# Patient Record
Sex: FEMALE | Race: WHITE | NOT HISPANIC OR LATINO | URBAN - METROPOLITAN AREA
[De-identification: names, ages, dates, MRNs, and addresses within clinical notes are randomized per-mention and may not be internally consistent; named-entity substitution may affect disease eponyms.]

---

## 2018-07-20 PROBLEM — Z00.00 ENCOUNTER FOR PREVENTIVE HEALTH EXAMINATION: Status: ACTIVE | Noted: 2018-07-20

## 2020-10-07 ENCOUNTER — INPATIENT (INPATIENT)
Facility: HOSPITAL | Age: 70
LOS: 15 days | Discharge: NURSING, REHAB OR LONG-TERM CARE | DRG: 871 | End: 2020-10-23
Attending: INTERNAL MEDICINE | Admitting: INTERNAL MEDICINE
Payer: MEDICARE

## 2020-10-07 VITALS — OXYGEN SATURATION: 81 % | HEIGHT: 65 IN | WEIGHT: 264.55 LBS

## 2020-10-07 DIAGNOSIS — J18.9 PNEUMONIA, UNSPECIFIED ORGANISM: ICD-10-CM

## 2020-10-07 DIAGNOSIS — A41.9 SEPSIS, UNSPECIFIED ORGANISM: ICD-10-CM

## 2020-10-07 DIAGNOSIS — E11.9 TYPE 2 DIABETES MELLITUS WITHOUT COMPLICATIONS: ICD-10-CM

## 2020-10-07 DIAGNOSIS — A40.3 SEPSIS DUE TO STREPTOCOCCUS PNEUMONIAE: ICD-10-CM

## 2020-10-07 DIAGNOSIS — J96.01 ACUTE RESPIRATORY FAILURE WITH HYPOXIA: ICD-10-CM

## 2020-10-07 DIAGNOSIS — M06.9 RHEUMATOID ARTHRITIS, UNSPECIFIED: ICD-10-CM

## 2020-10-07 DIAGNOSIS — E03.9 HYPOTHYROIDISM, UNSPECIFIED: ICD-10-CM

## 2020-10-07 LAB
ALBUMIN SERPL ELPH-MCNC: 3.3 G/DL — SIGNIFICANT CHANGE UP (ref 3.3–5)
ALP SERPL-CCNC: 44 U/L — SIGNIFICANT CHANGE UP (ref 40–120)
ALT FLD-CCNC: 66 U/L — HIGH (ref 10–45)
ANION GAP SERPL CALC-SCNC: 13 MMOL/L — SIGNIFICANT CHANGE UP (ref 5–17)
ANION GAP SERPL CALC-SCNC: 14 MMOL/L — SIGNIFICANT CHANGE UP (ref 5–17)
APPEARANCE UR: ABNORMAL
APTT BLD: 32.6 SEC — SIGNIFICANT CHANGE UP (ref 27.5–35.5)
AST SERPL-CCNC: 72 U/L — HIGH (ref 10–40)
BASE EXCESS BLDV CALC-SCNC: -5.6 MMOL/L — LOW (ref -2–2)
BASOPHILS # BLD AUTO: 0 K/UL — SIGNIFICANT CHANGE UP (ref 0–0.2)
BASOPHILS NFR BLD AUTO: 0 % — SIGNIFICANT CHANGE UP (ref 0–2)
BILIRUB SERPL-MCNC: 1.2 MG/DL — SIGNIFICANT CHANGE UP (ref 0.2–1.2)
BILIRUB UR-MCNC: NEGATIVE — SIGNIFICANT CHANGE UP
BUN SERPL-MCNC: 7 MG/DL — SIGNIFICANT CHANGE UP (ref 7–23)
BUN SERPL-MCNC: 9 MG/DL — SIGNIFICANT CHANGE UP (ref 7–23)
CA-I SERPL-SCNC: 1.11 MMOL/L — LOW (ref 1.12–1.3)
CALCIUM SERPL-MCNC: 7.9 MG/DL — LOW (ref 8.4–10.5)
CALCIUM SERPL-MCNC: 8.9 MG/DL — SIGNIFICANT CHANGE UP (ref 8.4–10.5)
CHLORIDE BLDV-SCNC: 94 MMOL/L — LOW (ref 96–108)
CHLORIDE SERPL-SCNC: 88 MMOL/L — LOW (ref 96–108)
CHLORIDE SERPL-SCNC: 90 MMOL/L — LOW (ref 96–108)
CHLORIDE UR-SCNC: 69 MMOL/L — SIGNIFICANT CHANGE UP
CO2 BLDV-SCNC: 23 MMOL/L — SIGNIFICANT CHANGE UP (ref 22–30)
CO2 SERPL-SCNC: 18 MMOL/L — LOW (ref 22–31)
CO2 SERPL-SCNC: 20 MMOL/L — LOW (ref 22–31)
COLOR SPEC: YELLOW — SIGNIFICANT CHANGE UP
CREAT ?TM UR-MCNC: 11 MG/DL — SIGNIFICANT CHANGE UP
CREAT SERPL-MCNC: 0.37 MG/DL — LOW (ref 0.5–1.3)
CREAT SERPL-MCNC: <0.3 MG/DL — LOW (ref 0.5–1.3)
DIFF PNL FLD: ABNORMAL
EOSINOPHIL # BLD AUTO: 0 K/UL — SIGNIFICANT CHANGE UP (ref 0–0.5)
EOSINOPHIL NFR BLD AUTO: 0 % — SIGNIFICANT CHANGE UP (ref 0–6)
GAS PNL BLDA: SIGNIFICANT CHANGE UP
GAS PNL BLDV: 123 MMOL/L — LOW (ref 135–145)
GAS PNL BLDV: SIGNIFICANT CHANGE UP
GAS PNL BLDV: SIGNIFICANT CHANGE UP
GLUCOSE BLDC GLUCOMTR-MCNC: 261 MG/DL — HIGH (ref 70–99)
GLUCOSE BLDV-MCNC: 183 MG/DL — HIGH (ref 70–99)
GLUCOSE SERPL-MCNC: 162 MG/DL — HIGH (ref 70–99)
GLUCOSE SERPL-MCNC: 282 MG/DL — HIGH (ref 70–99)
GLUCOSE UR QL: NEGATIVE — SIGNIFICANT CHANGE UP
HCO3 BLDV-SCNC: 21 MMOL/L — SIGNIFICANT CHANGE UP (ref 21–29)
HCT VFR BLD CALC: 30.6 % — LOW (ref 34.5–45)
HCT VFR BLDA CALC: 25 % — LOW (ref 39–50)
HGB BLD CALC-MCNC: 8 G/DL — LOW (ref 11.5–15.5)
HGB BLD-MCNC: 9.4 G/DL — LOW (ref 11.5–15.5)
INR BLD: 1.5 RATIO — HIGH (ref 0.88–1.16)
KETONES UR-MCNC: SIGNIFICANT CHANGE UP
LACTATE BLDV-MCNC: 2.8 MMOL/L — HIGH (ref 0.7–2)
LEUKOCYTE ESTERASE UR-ACNC: ABNORMAL
LYMPHOCYTES # BLD AUTO: 1.27 K/UL — SIGNIFICANT CHANGE UP (ref 1–3.3)
LYMPHOCYTES # BLD AUTO: 10.5 % — LOW (ref 13–44)
MAGNESIUM SERPL-MCNC: 1.5 MG/DL — LOW (ref 1.6–2.6)
MCHC RBC-ENTMCNC: 24.9 PG — LOW (ref 27–34)
MCHC RBC-ENTMCNC: 30.7 GM/DL — LOW (ref 32–36)
MCV RBC AUTO: 81.2 FL — SIGNIFICANT CHANGE UP (ref 80–100)
MONOCYTES # BLD AUTO: 0.31 K/UL — SIGNIFICANT CHANGE UP (ref 0–0.9)
MONOCYTES NFR BLD AUTO: 2.6 % — SIGNIFICANT CHANGE UP (ref 2–14)
NEUTROPHILS # BLD AUTO: 10.29 K/UL — HIGH (ref 1.8–7.4)
NEUTROPHILS NFR BLD AUTO: 65.8 % — SIGNIFICANT CHANGE UP (ref 43–77)
NITRITE UR-MCNC: NEGATIVE — SIGNIFICANT CHANGE UP
OSMOLALITY SERPL: 255 MOSMOL/KG — LOW (ref 280–301)
OSMOLALITY UR: 361 MOS/KG — SIGNIFICANT CHANGE UP (ref 300–900)
PCO2 BLDV: 54 MMHG — HIGH (ref 35–50)
PH BLDV: 7.22 — LOW (ref 7.35–7.45)
PH UR: 6 — SIGNIFICANT CHANGE UP (ref 5–8)
PHOSPHATE SERPL-MCNC: 2.9 MG/DL — SIGNIFICANT CHANGE UP (ref 2.5–4.5)
PLATELET # BLD AUTO: 132 K/UL — LOW (ref 150–400)
PO2 BLDV: 32 MMHG — SIGNIFICANT CHANGE UP (ref 25–45)
POTASSIUM BLDV-SCNC: 2.7 MMOL/L — CRITICAL LOW (ref 3.5–5.3)
POTASSIUM SERPL-MCNC: 3 MMOL/L — LOW (ref 3.5–5.3)
POTASSIUM SERPL-MCNC: 3.1 MMOL/L — LOW (ref 3.5–5.3)
POTASSIUM SERPL-SCNC: 3 MMOL/L — LOW (ref 3.5–5.3)
POTASSIUM SERPL-SCNC: 3.1 MMOL/L — LOW (ref 3.5–5.3)
PROT SERPL-MCNC: 6.7 G/DL — SIGNIFICANT CHANGE UP (ref 6–8.3)
PROT UR-MCNC: 100 — SIGNIFICANT CHANGE UP
PROTHROM AB SERPL-ACNC: 17.6 SEC — HIGH (ref 10.6–13.6)
RBC # BLD: 3.77 M/UL — LOW (ref 3.8–5.2)
RBC # FLD: 15.7 % — HIGH (ref 10.3–14.5)
SAO2 % BLDV: 48 % — LOW (ref 67–88)
SARS-COV-2 RNA SPEC QL NAA+PROBE: SIGNIFICANT CHANGE UP
SARS-COV-2 RNA SPEC QL NAA+PROBE: SIGNIFICANT CHANGE UP
SODIUM SERPL-SCNC: 121 MMOL/L — LOW (ref 135–145)
SODIUM SERPL-SCNC: 122 MMOL/L — LOW (ref 135–145)
SODIUM UR-SCNC: 62 MMOL/L — SIGNIFICANT CHANGE UP
SP GR SPEC: 1.02 — SIGNIFICANT CHANGE UP (ref 1.01–1.02)
UROBILINOGEN FLD QL: NEGATIVE — SIGNIFICANT CHANGE UP
WBC # BLD: 12.09 K/UL — HIGH (ref 3.8–10.5)
WBC # FLD AUTO: 12.09 K/UL — HIGH (ref 3.8–10.5)

## 2020-10-07 PROCEDURE — 99291 CRITICAL CARE FIRST HOUR: CPT | Mod: CS,25

## 2020-10-07 PROCEDURE — 71250 CT THORAX DX C-: CPT | Mod: 26

## 2020-10-07 PROCEDURE — 93010 ELECTROCARDIOGRAM REPORT: CPT | Mod: 59

## 2020-10-07 PROCEDURE — 71045 X-RAY EXAM CHEST 1 VIEW: CPT | Mod: 26,77

## 2020-10-07 PROCEDURE — 99291 CRITICAL CARE FIRST HOUR: CPT

## 2020-10-07 PROCEDURE — 31500 INSERT EMERGENCY AIRWAY: CPT

## 2020-10-07 PROCEDURE — 71045 X-RAY EXAM CHEST 1 VIEW: CPT | Mod: 26

## 2020-10-07 RX ORDER — SODIUM CHLORIDE 9 MG/ML
1500 INJECTION INTRAMUSCULAR; INTRAVENOUS; SUBCUTANEOUS ONCE
Refills: 0 | Status: COMPLETED | OUTPATIENT
Start: 2020-10-07 | End: 2020-10-07

## 2020-10-07 RX ORDER — GLUCAGON INJECTION, SOLUTION 0.5 MG/.1ML
1 INJECTION, SOLUTION SUBCUTANEOUS ONCE
Refills: 0 | Status: DISCONTINUED | OUTPATIENT
Start: 2020-10-07 | End: 2020-10-08

## 2020-10-07 RX ORDER — VANCOMYCIN HCL 1 G
1000 VIAL (EA) INTRAVENOUS ONCE
Refills: 0 | Status: COMPLETED | OUTPATIENT
Start: 2020-10-07 | End: 2020-10-07

## 2020-10-07 RX ORDER — POTASSIUM CHLORIDE 20 MEQ
10 PACKET (EA) ORAL
Refills: 0 | Status: COMPLETED | OUTPATIENT
Start: 2020-10-07 | End: 2020-10-07

## 2020-10-07 RX ORDER — FENTANYL CITRATE 50 UG/ML
50 INJECTION INTRAVENOUS ONCE
Refills: 0 | Status: DISCONTINUED | OUTPATIENT
Start: 2020-10-07 | End: 2020-10-07

## 2020-10-07 RX ORDER — MAGNESIUM SULFATE 500 MG/ML
2 VIAL (ML) INJECTION
Refills: 0 | Status: DISCONTINUED | OUTPATIENT
Start: 2020-10-07 | End: 2020-10-09

## 2020-10-07 RX ORDER — FENTANYL CITRATE 50 UG/ML
0.5 INJECTION INTRAVENOUS
Qty: 5000 | Refills: 0 | Status: DISCONTINUED | OUTPATIENT
Start: 2020-10-07 | End: 2020-10-08

## 2020-10-07 RX ORDER — AZITHROMYCIN 500 MG/1
500 TABLET, FILM COATED ORAL ONCE
Refills: 0 | Status: COMPLETED | OUTPATIENT
Start: 2020-10-07 | End: 2020-10-07

## 2020-10-07 RX ORDER — DEXTROSE 50 % IN WATER 50 %
12.5 SYRINGE (ML) INTRAVENOUS ONCE
Refills: 0 | Status: DISCONTINUED | OUTPATIENT
Start: 2020-10-07 | End: 2020-10-08

## 2020-10-07 RX ORDER — CHLORHEXIDINE GLUCONATE 213 G/1000ML
15 SOLUTION TOPICAL EVERY 12 HOURS
Refills: 0 | Status: DISCONTINUED | OUTPATIENT
Start: 2020-10-07 | End: 2020-10-08

## 2020-10-07 RX ORDER — HYDROCORTISONE 20 MG
100 TABLET ORAL THREE TIMES A DAY
Refills: 0 | Status: DISCONTINUED | OUTPATIENT
Start: 2020-10-07 | End: 2020-10-08

## 2020-10-07 RX ORDER — AZTREONAM 2 G
2000 VIAL (EA) INJECTION ONCE
Refills: 0 | Status: COMPLETED | OUTPATIENT
Start: 2020-10-07 | End: 2020-10-07

## 2020-10-07 RX ORDER — KETAMINE HYDROCHLORIDE 100 MG/ML
100 INJECTION INTRAMUSCULAR; INTRAVENOUS ONCE
Refills: 0 | Status: DISCONTINUED | OUTPATIENT
Start: 2020-10-07 | End: 2020-10-07

## 2020-10-07 RX ORDER — HYDROCORTISONE 20 MG
100 TABLET ORAL ONCE
Refills: 0 | Status: COMPLETED | OUTPATIENT
Start: 2020-10-07 | End: 2020-10-07

## 2020-10-07 RX ORDER — HEPARIN SODIUM 5000 [USP'U]/ML
5000 INJECTION INTRAVENOUS; SUBCUTANEOUS EVERY 12 HOURS
Refills: 0 | Status: DISCONTINUED | OUTPATIENT
Start: 2020-10-07 | End: 2020-10-07

## 2020-10-07 RX ORDER — ROCURONIUM BROMIDE 10 MG/ML
100 VIAL (ML) INTRAVENOUS ONCE
Refills: 0 | Status: COMPLETED | OUTPATIENT
Start: 2020-10-07 | End: 2020-10-07

## 2020-10-07 RX ORDER — SODIUM CHLORIDE 9 MG/ML
3000 INJECTION INTRAMUSCULAR; INTRAVENOUS; SUBCUTANEOUS ONCE
Refills: 0 | Status: DISCONTINUED | OUTPATIENT
Start: 2020-10-07 | End: 2020-10-07

## 2020-10-07 RX ORDER — INSULIN LISPRO 100/ML
VIAL (ML) SUBCUTANEOUS
Refills: 0 | Status: DISCONTINUED | OUTPATIENT
Start: 2020-10-07 | End: 2020-10-08

## 2020-10-07 RX ORDER — VANCOMYCIN HCL 1 G
1000 VIAL (EA) INTRAVENOUS EVERY 12 HOURS
Refills: 0 | Status: DISCONTINUED | OUTPATIENT
Start: 2020-10-07 | End: 2020-10-08

## 2020-10-07 RX ORDER — MAGNESIUM SULFATE 500 MG/ML
2 VIAL (ML) INJECTION ONCE
Refills: 0 | Status: COMPLETED | OUTPATIENT
Start: 2020-10-07 | End: 2020-10-07

## 2020-10-07 RX ORDER — DEXTROSE 50 % IN WATER 50 %
25 SYRINGE (ML) INTRAVENOUS ONCE
Refills: 0 | Status: DISCONTINUED | OUTPATIENT
Start: 2020-10-07 | End: 2020-10-08

## 2020-10-07 RX ORDER — POTASSIUM CHLORIDE 20 MEQ
40 PACKET (EA) ORAL ONCE
Refills: 0 | Status: COMPLETED | OUTPATIENT
Start: 2020-10-07 | End: 2020-10-07

## 2020-10-07 RX ORDER — ACETAMINOPHEN 500 MG
650 TABLET ORAL EVERY 4 HOURS
Refills: 0 | Status: DISCONTINUED | OUTPATIENT
Start: 2020-10-07 | End: 2020-10-09

## 2020-10-07 RX ORDER — METOPROLOL TARTRATE 50 MG
5 TABLET ORAL ONCE
Refills: 0 | Status: COMPLETED | OUTPATIENT
Start: 2020-10-07 | End: 2020-10-07

## 2020-10-07 RX ORDER — SODIUM CHLORIDE 9 MG/ML
1000 INJECTION, SOLUTION INTRAVENOUS
Refills: 0 | Status: DISCONTINUED | OUTPATIENT
Start: 2020-10-07 | End: 2020-10-08

## 2020-10-07 RX ORDER — INSULIN HUMAN 100 [IU]/ML
5 INJECTION, SOLUTION SUBCUTANEOUS
Qty: 100 | Refills: 0 | Status: DISCONTINUED | OUTPATIENT
Start: 2020-10-07 | End: 2020-10-07

## 2020-10-07 RX ORDER — DEXMEDETOMIDINE HYDROCHLORIDE IN 0.9% SODIUM CHLORIDE 4 UG/ML
0.2 INJECTION INTRAVENOUS
Qty: 200 | Refills: 0 | Status: DISCONTINUED | OUTPATIENT
Start: 2020-10-07 | End: 2020-10-09

## 2020-10-07 RX ORDER — DEXTROSE 50 % IN WATER 50 %
15 SYRINGE (ML) INTRAVENOUS ONCE
Refills: 0 | Status: DISCONTINUED | OUTPATIENT
Start: 2020-10-07 | End: 2020-10-08

## 2020-10-07 RX ORDER — CEFTRIAXONE 500 MG/1
1000 INJECTION, POWDER, FOR SOLUTION INTRAMUSCULAR; INTRAVENOUS EVERY 24 HOURS
Refills: 0 | Status: DISCONTINUED | OUTPATIENT
Start: 2020-10-07 | End: 2020-10-08

## 2020-10-07 RX ORDER — AZITHROMYCIN 500 MG/1
500 TABLET, FILM COATED ORAL EVERY 24 HOURS
Refills: 0 | Status: DISCONTINUED | OUTPATIENT
Start: 2020-10-08 | End: 2020-10-08

## 2020-10-07 RX ORDER — NOREPINEPHRINE BITARTRATE/D5W 8 MG/250ML
0.05 PLASTIC BAG, INJECTION (ML) INTRAVENOUS
Qty: 8 | Refills: 0 | Status: DISCONTINUED | OUTPATIENT
Start: 2020-10-07 | End: 2020-10-07

## 2020-10-07 RX ADMIN — Medication 100 MILLIGRAM(S): at 21:53

## 2020-10-07 RX ADMIN — Medication 100 MILLIGRAM(S): at 13:49

## 2020-10-07 RX ADMIN — SODIUM CHLORIDE 750 MILLILITER(S): 9 INJECTION INTRAMUSCULAR; INTRAVENOUS; SUBCUTANEOUS at 13:09

## 2020-10-07 RX ADMIN — KETAMINE HYDROCHLORIDE 100 MILLIGRAM(S): 100 INJECTION INTRAMUSCULAR; INTRAVENOUS at 16:07

## 2020-10-07 RX ADMIN — Medication 250 MILLIGRAM(S): at 20:04

## 2020-10-07 RX ADMIN — Medication 250 MILLIGRAM(S): at 15:25

## 2020-10-07 RX ADMIN — FENTANYL CITRATE 1.04 MICROGRAM(S)/KG/HR: 50 INJECTION INTRAVENOUS at 18:29

## 2020-10-07 RX ADMIN — Medication 5 MILLIGRAM(S): at 20:53

## 2020-10-07 RX ADMIN — Medication 100 MILLIEQUIVALENT(S): at 21:53

## 2020-10-07 RX ADMIN — AZITHROMYCIN 250 MILLIGRAM(S): 500 TABLET, FILM COATED ORAL at 14:12

## 2020-10-07 RX ADMIN — FENTANYL CITRATE 50 MICROGRAM(S): 50 INJECTION INTRAVENOUS at 16:15

## 2020-10-07 RX ADMIN — Medication 50 GRAM(S): at 20:52

## 2020-10-07 RX ADMIN — Medication 40 MILLIEQUIVALENT(S): at 21:52

## 2020-10-07 RX ADMIN — DEXMEDETOMIDINE HYDROCHLORIDE IN 0.9% SODIUM CHLORIDE 2.08 MICROGRAM(S)/KG/HR: 4 INJECTION INTRAVENOUS at 20:04

## 2020-10-07 RX ADMIN — CHLORHEXIDINE GLUCONATE 15 MILLILITER(S): 213 SOLUTION TOPICAL at 18:28

## 2020-10-07 RX ADMIN — Medication 100 MILLIGRAM(S): at 16:09

## 2020-10-07 RX ADMIN — CEFTRIAXONE 100 MILLIGRAM(S): 500 INJECTION, POWDER, FOR SOLUTION INTRAMUSCULAR; INTRAVENOUS at 18:29

## 2020-10-07 RX ADMIN — Medication 100 MILLIEQUIVALENT(S): at 20:04

## 2020-10-07 RX ADMIN — Medication 100 MILLIEQUIVALENT(S): at 18:29

## 2020-10-07 RX ADMIN — Medication 100 MILLIGRAM(S): at 12:58

## 2020-10-07 RX ADMIN — Medication 2000 MILLIGRAM(S): at 13:58

## 2020-10-07 RX ADMIN — Medication 3.89 MICROGRAM(S)/KG/MIN: at 16:36

## 2020-10-07 RX ADMIN — Medication 50 GRAM(S): at 22:22

## 2020-10-07 RX ADMIN — FENTANYL CITRATE 1.04 MICROGRAM(S)/KG/HR: 50 INJECTION INTRAVENOUS at 16:17

## 2020-10-07 NOTE — CONSULT NOTE ADULT - ASSESSMENT
71 y/o F with PMH of RA, DM, HTN, fibromyalgia, hypothyroidism, brain aneurysm. Presents to ED with SOB and fever. Reports 3 days of fever, Tmax 102.5 this morning, no known COVID exposures. Found to be hypoxic by EMS and placed on 100% NRB. CXR with LLL opacity. Seen in ED - as per nurse pt went to CT chest and came back with increased WOB and altered mental status.

## 2020-10-07 NOTE — CHART NOTE - NSCHARTNOTEFT_GEN_A_CORE
MICU Accept Note    CHIEF COMPLAINT: Mode: AC/ CMV (Assist Control/ Continuous Mandatory Ventilation), RR (machine): 16, TV (machine): 400, FiO2: 100, PEEP: 5, ITime: 1, MAP: 11, PIP: 24    HPI / INTERVAL HISTORY:  Patient is a 69 yo F with history of RA, DM, HTN, fibromyalgia, hypothyroidism, brain aneurysm s/p repair BIBEMS for shortness of breath and fever. Per EMS, patient has had 3 days of fever, Tmax 102.5 this morning 7-8 AM, no known COVID exposures, took tylenol at unknown time before being brought in. Symptoms started on Monday. Per EMS, vitals on scene were 114/70, RA 68 improved NRB to 100%. She also has not taken her meds in 2 days. Patient c/o feeling fatigued, short of breath. Denies chest pain. Patient uses a wheelchair at baseline 2/2 arthritis.    Seen with resp distress, hypoxia and tachycardia in ED.  Placed on BiPAP.  Continued to be hypoxic w/ AMS.  Intubated for hypoxemic respiratory failure likely 2/2 PNA.  CXR suspicious for novel corona virus PNA, will be accepted to PICU for COVID-19 r/o.    PAST MEDICAL & SURGICAL HISTORY:  Hypothyroid  Fibromyalgia  Rheumatoid arthritis  Diabetes    FAMILY HISTORY:    SOCIAL HISTORY:  Smoking:   Substance Use:   EtOH Use:   Marital Status:   Sexual History:   Occupation:  Recent Travel:  Country of Birth:   Advance Directives:     HOME MEDICATIONS:    Allergies    pcn, pineapple (Unknown)  penicillin (Unknown)    Intolerances      REVIEW OF SYSTEMS:  unable to assess as pt is intubated and sedated    OBJECTIVE:  ICU Vital Signs Last 24 Hrs  T(C): 36.6 (07 Oct 2020 15:50), Max: 37.3 (07 Oct 2020 12:10)  T(F): 97.9 (07 Oct 2020 15:50), Max: 99.2 (07 Oct 2020 12:10)  HR: 95 (07 Oct 2020 17:09) (78 - 109)  BP: 158/77 (07 Oct 2020 17:09) (92/61 - 158/77)  BP(mean): --  ABP: --  ABP(mean): --  RR: 16 (07 Oct 2020 17:09) (16 - 30)  SpO2: 100% (07 Oct 2020 17:09) (81% - 100%)    Mode: AC/ CMV (Assist Control/ Continuous Mandatory Ventilation), RR (machine): 16, TV (machine): 400, FiO2: 100, PEEP: 5, ITime: 1, MAP: 11, PIP: 24    CAPILLARY BLOOD GLUCOSE      POCT Blood Glucose.: 261 mg/dL (07 Oct 2020 15:31)      PHYSICAL EXAM:  General: medically sedated, elder  HEENT: intubated  Neck: soft, no LAD  Chest/Lungs: bibasilar rales, moving air well on mech vent  Heart: tachycardia to mid 90s; regular rhythm; systolic murmur left parasternal; no R/G  Abdomen: soft, non-tender, normal BS  Extremities: warm, well perfused; no edema noted  Skin: warm  Neuro: medically sedated  Psych: not assessed    LINES: 20G R forearm PIV; 20G left antecubital PIV and 22G left hand PIV    HOSPITAL MEDICATIONS:  MEDICATIONS  (STANDING):  cefTRIAXone   IVPB 1000 milliGRAM(s) IV Intermittent every 24 hours  chlorhexidine 0.12% Liquid 15 milliLiter(s) Oral Mucosa every 12 hours  dextrose 5%. 1000 milliLiter(s) (50 mL/Hr) IV Continuous <Continuous>  dextrose 50% Injectable 12.5 Gram(s) IV Push once  dextrose 50% Injectable 25 Gram(s) IV Push once  dextrose 50% Injectable 25 Gram(s) IV Push once  fentaNYL   Infusion... 0.5 MICROgram(s)/kG/Hr (1.04 mL/Hr) IV Continuous <Continuous>  heparin   Injectable 5000 Unit(s) SubCutaneous every 12 hours  hydrocortisone sodium succinate Injectable 100 milliGRAM(s) IV Push three times a day  insulin lispro (HumaLOG) corrective regimen sliding scale   SubCutaneous three times a day before meals  norepinephrine Infusion 0.05 MICROgram(s)/kG/Min (3.89 mL/Hr) IV Continuous <Continuous>  vancomycin  IVPB 1000 milliGRAM(s) IV Intermittent every 12 hours    MEDICATIONS  (PRN):  dextrose 40% Gel 15 Gram(s) Oral once PRN Blood Glucose LESS THAN 70 milliGRAM(s)/deciliter  glucagon  Injectable 1 milliGRAM(s) IntraMuscular once PRN Glucose LESS THAN 70 milligrams/deciliter      LABS:                        9.4    12.09 )-----------( 132      ( 07 Oct 2020 12:39 )             30.6     Hgb Trend: 9.4<--  10-07    122<L>  |  88<L>  |  7   ----------------------------<  162<H>  3.0<L>   |  20<L>  |  0.37<L>    Ca    8.9      07 Oct 2020 12:39    TPro  6.7  /  Alb  3.3  /  TBili  1.2  /  DBili  x   /  AST  72<H>  /  ALT  66<H>  /  AlkPhos  44  10-07    Creatinine Trend: 0.37<--  PT/INR - ( 07 Oct 2020 12:39 )   PT: 17.6 sec;   INR: 1.50 ratio         PTT - ( 07 Oct 2020 12:39 )  PTT:32.6 sec  Urinalysis Basic - ( 07 Oct 2020 12:40 )    Color: Yellow / Appearance: Slightly Turbid / S.022 / pH: x  Gluc: x / Ketone: Trace  / Bili: Negative / Urobili: Negative   Blood: x / Protein: 100 / Nitrite: Negative   Leuk Esterase: Large / RBC: 3 /hpf / WBC 30 /HPF   Sq Epi: x / Non Sq Epi: x / Bacteria: x      Arterial Blood Gas:  10-07 @ 15:41  7.14/60/71/19/88/-9.0  ABG lactate: --    Venous Blood Gas:  10-07 @ 14:49  7.22/54/32/21/48  VBG Lactate: 2.8  Venous Blood Gas:  10-07 @ 12:39  7.25/54/31/23/47  VBG Lactate: 4.3    MICROBIOLOGY:     RADIOLOGY & ADDITIONAL TESTS:        This is a 70/F w/ ... BIBEMS for AMS.  Found in respiratory distress likely 2/2 PNA.  Intubated for hypoxemic respiratory failure, admitted to PICU for COVID-19 r/o.      #Neuro  - c/w medical sedation  - will call collateral for baseline MS    #Cardiovascular  - f/u EKG  - tachycardic in setting of infection  - pt actually hypertensive but will hold meds in setting of infection at this time    #Pulmonary  - intubated on mechanical ventilation  - PRVC: 16/400, 5/100  - f/u post-intubation ABG    #FEN/GI  - NPO at this time    #/Renal    #Endo    #Heme    #ID  - c/w abx  - f/u COVID-19 PCR x2    #Prophylaxis MICU Accept Note    CHIEF COMPLAINT: Mode: AC/ CMV (Assist Control/ Continuous Mandatory Ventilation), RR (machine): 16, TV (machine): 400, FiO2: 100, PEEP: 5, ITime: 1, MAP: 11, PIP: 24    HPI / INTERVAL HISTORY:  Patient is a 69 yo F with history of RA, DM, HTN, fibromyalgia, hypothyroidism, brain aneurysm s/p repair BIBEMS for shortness of breath and fever. Per EMS, patient has had 3 days of fever, Tmax 102.5 this morning 7-8 AM, no known COVID exposures, took tylenol at unknown time before being brought in. Symptoms started on Monday. Per EMS, vitals on scene were 114/70, RA 68 improved NRB to 100%. She also has not taken her meds in 2 days. Patient c/o feeling fatigued, short of breath. Denies chest pain. Patient uses a wheelchair at baseline 2/2 arthritis.    Seen with resp distress, hypoxia and tachycardia in ED.  Placed on BiPAP.  Continued to be hypoxic w/ AMS.  Intubated for hypoxemic respiratory failure likely 2/2 PNA.  CXR suspicious for novel corona virus PNA, will be accepted to PICU for COVID-19 r/o.    PAST MEDICAL & SURGICAL HISTORY:  Hypothyroid  Fibromyalgia  Rheumatoid arthritis  Diabetes    FAMILY HISTORY:    SOCIAL HISTORY:  Smoking:   Substance Use:   EtOH Use:   Marital Status:   Sexual History:   Occupation:  Recent Travel:  Country of Birth:   Advance Directives:     HOME MEDICATIONS:    Allergies    pcn, pineapple (Unknown)  penicillin (Unknown)    Intolerances      REVIEW OF SYSTEMS:  unable to assess as pt is intubated and sedated    OBJECTIVE:  ICU Vital Signs Last 24 Hrs  T(C): 36.6 (07 Oct 2020 15:50), Max: 37.3 (07 Oct 2020 12:10)  T(F): 97.9 (07 Oct 2020 15:50), Max: 99.2 (07 Oct 2020 12:10)  HR: 95 (07 Oct 2020 17:09) (78 - 109)  BP: 158/77 (07 Oct 2020 17:09) (92/61 - 158/77)  BP(mean): --  ABP: --  ABP(mean): --  RR: 16 (07 Oct 2020 17:09) (16 - 30)  SpO2: 100% (07 Oct 2020 17:09) (81% - 100%)    Mode: AC/ CMV (Assist Control/ Continuous Mandatory Ventilation), RR (machine): 16, TV (machine): 400, FiO2: 100, PEEP: 5, ITime: 1, MAP: 11, PIP: 24    CAPILLARY BLOOD GLUCOSE      POCT Blood Glucose.: 261 mg/dL (07 Oct 2020 15:31)      PHYSICAL EXAM:  General: medically sedated, elder  HEENT: intubated  Neck: soft, no LAD  Chest/Lungs: bibasilar rales, moving air well on mech vent  Heart: tachycardia to mid 90s; regular rhythm; systolic murmur left parasternal; no R/G  Abdomen: soft, non-tender, normal BS  Extremities: warm, well perfused; no edema noted  Skin: warm  Neuro: medically sedated  Psych: not assessed    LINES: 20G R forearm PIV; 20G left antecubital PIV and 22G left hand PIV    HOSPITAL MEDICATIONS:  MEDICATIONS  (STANDING):  cefTRIAXone   IVPB 1000 milliGRAM(s) IV Intermittent every 24 hours  chlorhexidine 0.12% Liquid 15 milliLiter(s) Oral Mucosa every 12 hours  dextrose 5%. 1000 milliLiter(s) (50 mL/Hr) IV Continuous <Continuous>  dextrose 50% Injectable 12.5 Gram(s) IV Push once  dextrose 50% Injectable 25 Gram(s) IV Push once  dextrose 50% Injectable 25 Gram(s) IV Push once  fentaNYL   Infusion... 0.5 MICROgram(s)/kG/Hr (1.04 mL/Hr) IV Continuous <Continuous>  heparin   Injectable 5000 Unit(s) SubCutaneous every 12 hours  hydrocortisone sodium succinate Injectable 100 milliGRAM(s) IV Push three times a day  insulin lispro (HumaLOG) corrective regimen sliding scale   SubCutaneous three times a day before meals  norepinephrine Infusion 0.05 MICROgram(s)/kG/Min (3.89 mL/Hr) IV Continuous <Continuous>  vancomycin  IVPB 1000 milliGRAM(s) IV Intermittent every 12 hours    MEDICATIONS  (PRN):  dextrose 40% Gel 15 Gram(s) Oral once PRN Blood Glucose LESS THAN 70 milliGRAM(s)/deciliter  glucagon  Injectable 1 milliGRAM(s) IntraMuscular once PRN Glucose LESS THAN 70 milligrams/deciliter      LABS:                        9.4    12.09 )-----------( 132      ( 07 Oct 2020 12:39 )             30.6     Hgb Trend: 9.4<--  10-07    122<L>  |  88<L>  |  7   ----------------------------<  162<H>  3.0<L>   |  20<L>  |  0.37<L>    Ca    8.9      07 Oct 2020 12:39    TPro  6.7  /  Alb  3.3  /  TBili  1.2  /  DBili  x   /  AST  72<H>  /  ALT  66<H>  /  AlkPhos  44  10-07    Creatinine Trend: 0.37<--  PT/INR - ( 07 Oct 2020 12:39 )   PT: 17.6 sec;   INR: 1.50 ratio         PTT - ( 07 Oct 2020 12:39 )  PTT:32.6 sec  Urinalysis Basic - ( 07 Oct 2020 12:40 )    Color: Yellow / Appearance: Slightly Turbid / S.022 / pH: x  Gluc: x / Ketone: Trace  / Bili: Negative / Urobili: Negative   Blood: x / Protein: 100 / Nitrite: Negative   Leuk Esterase: Large / RBC: 3 /hpf / WBC 30 /HPF   Sq Epi: x / Non Sq Epi: x / Bacteria: x      Arterial Blood Gas:  10-07 @ 15:41  7.14/60/71/19/88/-9.0  ABG lactate: --    Venous Blood Gas:  10-07 @ 14:49  7.22/54/32/21/48  VBG Lactate: 2.8  Venous Blood Gas:  10-07 @ 12:39  7.25/54/31/23/47  VBG Lactate: 4.3    MICROBIOLOGY:     RADIOLOGY & ADDITIONAL TESTS:        This is a 70/F w/ ... BIBEMS for AMS.  Found in respiratory distress likely 2/2 PNA.  Intubated for hypoxemic respiratory failure, admitted to PICU for COVID-19 r/o.      #Neuro  - c/w medical sedation  - will call collateral for baseline MS    #Cardiovascular  - f/u EKG  - tachycardic in setting of infection  - pt actually hypertensive but will hold meds in setting of infection at this time    #Pulmonary  - intubated on mechanical ventilation  - PRVC: 16/400, 5/100  - f/u post-intubation ABG    #FEN/GI  - NPO at this time  - f/u OGT placement  - will consider starting TF    #/Renal  - no active issue     #Endo  - c/w synthroid    #Heme      #ID  - c/w abx  - f/u COVID-19 PCR x2    #Prophylaxis MICU Accept Note    CHIEF COMPLAINT: Mode: AC/ CMV (Assist Control/ Continuous Mandatory Ventilation), RR (machine): 16, TV (machine): 400, FiO2: 100, PEEP: 5, ITime: 1, MAP: 11, PIP: 24    HPI / INTERVAL HISTORY:  Patient is a 69 yo F with history of RA, DM, HTN, fibromyalgia, hypothyroidism, brain aneurysm s/p repair BIBEMS for shortness of breath and fever. Per EMS, patient has had 3 days of fever, Tmax 102.5 this morning 7-8 AM, no known COVID exposures, took tylenol at unknown time before being brought in. Symptoms started on Monday. Per EMS, vitals on scene were 114/70, RA 68 improved NRB to 100%. She also has not taken her meds in 2 days. Patient c/o feeling fatigued, short of breath. Denies chest pain. Patient uses a wheelchair at baseline 2/2 arthritis.    Seen with resp distress, hypoxia and tachycardia in ED.  Placed on BiPAP.  Continued to be hypoxic w/ AMS.  Intubated for hypoxemic respiratory failure likely 2/2 PNA.  CXR suspicious for novel corona virus PNA, will be accepted to PICU for COVID-19 r/o.    PAST MEDICAL & SURGICAL HISTORY:  Hypothyroid  Fibromyalgia  Rheumatoid arthritis  Diabetes    FAMILY HISTORY:    SOCIAL HISTORY:  Smoking:   Substance Use:   EtOH Use:   Marital Status:   Sexual History:   Occupation:  Recent Travel:  Country of Birth:   Advance Directives:     HOME MEDICATIONS:    Allergies    pcn, pineapple (Unknown)  penicillin (Unknown)    Intolerances      REVIEW OF SYSTEMS:  unable to assess as pt is intubated and sedated    OBJECTIVE:  ICU Vital Signs Last 24 Hrs  T(C): 36.6 (07 Oct 2020 15:50), Max: 37.3 (07 Oct 2020 12:10)  T(F): 97.9 (07 Oct 2020 15:50), Max: 99.2 (07 Oct 2020 12:10)  HR: 95 (07 Oct 2020 17:09) (78 - 109)  BP: 158/77 (07 Oct 2020 17:09) (92/61 - 158/77)  BP(mean): --  ABP: --  ABP(mean): --  RR: 16 (07 Oct 2020 17:09) (16 - 30)  SpO2: 100% (07 Oct 2020 17:09) (81% - 100%)    Mode: AC/ CMV (Assist Control/ Continuous Mandatory Ventilation), RR (machine): 16, TV (machine): 400, FiO2: 100, PEEP: 5, ITime: 1, MAP: 11, PIP: 24    CAPILLARY BLOOD GLUCOSE      POCT Blood Glucose.: 261 mg/dL (07 Oct 2020 15:31)      PHYSICAL EXAM:  General: medically sedated, elder  HEENT: intubated  Neck: soft, no LAD  Chest/Lungs: bibasilar rales, moving air well on mech vent  Heart: tachycardia to mid 90s; regular rhythm; systolic murmur left parasternal; no R/G  Abdomen: soft, non-tender, normal BS  Extremities: warm, well perfused; no edema noted  Skin: warm  Neuro: medically sedated  Psych: not assessed    LINES: 20G R forearm PIV; 20G left antecubital PIV and 22G left hand PIV    HOSPITAL MEDICATIONS:  MEDICATIONS  (STANDING):  cefTRIAXone   IVPB 1000 milliGRAM(s) IV Intermittent every 24 hours  chlorhexidine 0.12% Liquid 15 milliLiter(s) Oral Mucosa every 12 hours  dextrose 5%. 1000 milliLiter(s) (50 mL/Hr) IV Continuous <Continuous>  dextrose 50% Injectable 12.5 Gram(s) IV Push once  dextrose 50% Injectable 25 Gram(s) IV Push once  dextrose 50% Injectable 25 Gram(s) IV Push once  fentaNYL   Infusion... 0.5 MICROgram(s)/kG/Hr (1.04 mL/Hr) IV Continuous <Continuous>  heparin   Injectable 5000 Unit(s) SubCutaneous every 12 hours  hydrocortisone sodium succinate Injectable 100 milliGRAM(s) IV Push three times a day  insulin lispro (HumaLOG) corrective regimen sliding scale   SubCutaneous three times a day before meals  norepinephrine Infusion 0.05 MICROgram(s)/kG/Min (3.89 mL/Hr) IV Continuous <Continuous>  vancomycin  IVPB 1000 milliGRAM(s) IV Intermittent every 12 hours    MEDICATIONS  (PRN):  dextrose 40% Gel 15 Gram(s) Oral once PRN Blood Glucose LESS THAN 70 milliGRAM(s)/deciliter  glucagon  Injectable 1 milliGRAM(s) IntraMuscular once PRN Glucose LESS THAN 70 milligrams/deciliter      LABS:                        9.4    12.09 )-----------( 132      ( 07 Oct 2020 12:39 )             30.6     Hgb Trend: 9.4<--  10-07    122<L>  |  88<L>  |  7   ----------------------------<  162<H>  3.0<L>   |  20<L>  |  0.37<L>    Ca    8.9      07 Oct 2020 12:39    TPro  6.7  /  Alb  3.3  /  TBili  1.2  /  DBili  x   /  AST  72<H>  /  ALT  66<H>  /  AlkPhos  44  10-07    Creatinine Trend: 0.37<--  PT/INR - ( 07 Oct 2020 12:39 )   PT: 17.6 sec;   INR: 1.50 ratio         PTT - ( 07 Oct 2020 12:39 )  PTT:32.6 sec  Urinalysis Basic - ( 07 Oct 2020 12:40 )    Color: Yellow / Appearance: Slightly Turbid / S.022 / pH: x  Gluc: x / Ketone: Trace  / Bili: Negative / Urobili: Negative   Blood: x / Protein: 100 / Nitrite: Negative   Leuk Esterase: Large / RBC: 3 /hpf / WBC 30 /HPF   Sq Epi: x / Non Sq Epi: x / Bacteria: x      Arterial Blood Gas:  10-07 @ 15:41  7.14/60/71/19/88/-9.0  ABG lactate: --    Venous Blood Gas:  10-07 @ 14:49  7.22/54/32/21/48  VBG Lactate: 2.8  Venous Blood Gas:  10-07 @ 12:39  7.25/54/31/23/47  VBG Lactate: 4.3    MICROBIOLOGY:     RADIOLOGY & ADDITIONAL TESTS:        This is a 70/F w/ ... BIBEMS for AMS.  Found in respiratory distress likely 2/2 PNA.  Intubated for hypoxemic respiratory failure, admitted to PICU for COVID-19 r/o.    #Neuro  - c/w medical sedation  - will call collateral for baseline MS    #Cardiovascular  - f/u EKG  - tachycardic in setting of infection  - pt actually hypertensive but will hold meds in setting of infection at this time    #Pulmonary  Acute Hypoxemic Respiratory Failure  - likely 2/2 multifocal PNA demonstrated on CT chest  - intubated, now on mechanical ventilation  - PRVC: 16/400, 5100  - f/u post-intubation ABG    #FEN/GI  - NPO at this time  - f/u OGT placement  - will consider starting TF  - mild transaminitis, likely ISO infection; f/u legionella    #/Renal  Hyponatremia  - euvolemic on exam  - SOsm, UOsm, Opal, UCl    Hypokalemia  - replete PRN    #Endo  - c/w synthroid    #Heme  Leukocytosis  - possibly in setting of infection  - c/w abx    Normocytic anemia  - f/u iron studies, B12, folate, retic    #ID  multifocal PNA  - c/w Vanc, CTX, azithromycin  - f/u urine legionella  - f/u strep pneumo urine Ag  - obtain MRSA nose swab  - f/u COVID-19 PCR x2    #Prophylaxis  - Lovenox MICU Accept Note    CHIEF COMPLAINT: AMS    HPI / INTERVAL HISTORY:  Patient is a 69 yo F with history of RA, DM, HTN, fibromyalgia, hypothyroidism, brain aneurysm s/p repair BIBEMS for shortness of breath and fever. Per EMS, patient has had 3 days of fever, Tmax 102.5 this morning 7-8 AM, no known COVID exposures, took tylenol at unknown time before being brought in. Symptoms started on Monday. Per EMS, vitals on scene were 114/70, RA 68 improved NRB to 100%. She also has not taken her meds in 2 days. Patient c/o feeling fatigued, short of breath. Denies chest pain. Patient uses a wheelchair at baseline 2/2 arthritis.    Seen with resp distress, hypoxia and tachycardia in ED.  Placed on BiPAP.  Continued to be hypoxic w/ AMS.  Intubated for hypoxemic respiratory failure likely 2/2 PNA.  CXR suspicious for novel corona virus PNA, will be accepted to PICU for COVID-19 r/o.    PAST MEDICAL & SURGICAL HISTORY:  Hypothyroid  Fibromyalgia  Rheumatoid arthritis  Diabetes    FAMILY HISTORY:    SOCIAL HISTORY:  Smoking:   Substance Use:   EtOH Use:   Marital Status:   Sexual History:   Occupation:  Recent Travel:  Country of Birth:   Advance Directives:     HOME MEDICATIONS:    Allergies    pcn, pineapple (Unknown)  penicillin (Unknown)    Intolerances      REVIEW OF SYSTEMS:  unable to assess as pt is intubated and sedated    OBJECTIVE:  ICU Vital Signs Last 24 Hrs  T(C): 36.6 (07 Oct 2020 15:50), Max: 37.3 (07 Oct 2020 12:10)  T(F): 97.9 (07 Oct 2020 15:50), Max: 99.2 (07 Oct 2020 12:10)  HR: 95 (07 Oct 2020 17:09) (78 - 109)  BP: 158/77 (07 Oct 2020 17:09) (92/61 - 158/77)  BP(mean): --  ABP: --  ABP(mean): --  RR: 16 (07 Oct 2020 17:09) (16 - 30)  SpO2: 100% (07 Oct 2020 17:09) (81% - 100%)    Mode: AC/ CMV (Assist Control/ Continuous Mandatory Ventilation), RR (machine): 16, TV (machine): 400, FiO2: 100, PEEP: 5, ITime: 1, MAP: 11, PIP: 24    CAPILLARY BLOOD GLUCOSE      POCT Blood Glucose.: 261 mg/dL (07 Oct 2020 15:31)      PHYSICAL EXAM:  General: medically sedated, elder  HEENT: intubated  Neck: soft, no LAD  Chest/Lungs: bibasilar rales, moving air well on mech vent  Heart: tachycardia to mid 90s; regular rhythm; systolic murmur left parasternal; no R/G  Abdomen: soft, non-tender, normal BS  Extremities: warm, well perfused; no edema noted  Skin: warm  Neuro: medically sedated  Psych: not assessed    LINES: 20G R forearm PIV; 20G left antecubital PIV and 22G left hand PIV    HOSPITAL MEDICATIONS:  MEDICATIONS  (STANDING):  cefTRIAXone   IVPB 1000 milliGRAM(s) IV Intermittent every 24 hours  chlorhexidine 0.12% Liquid 15 milliLiter(s) Oral Mucosa every 12 hours  dextrose 5%. 1000 milliLiter(s) (50 mL/Hr) IV Continuous <Continuous>  dextrose 50% Injectable 12.5 Gram(s) IV Push once  dextrose 50% Injectable 25 Gram(s) IV Push once  dextrose 50% Injectable 25 Gram(s) IV Push once  fentaNYL   Infusion... 0.5 MICROgram(s)/kG/Hr (1.04 mL/Hr) IV Continuous <Continuous>  heparin   Injectable 5000 Unit(s) SubCutaneous every 12 hours  hydrocortisone sodium succinate Injectable 100 milliGRAM(s) IV Push three times a day  insulin lispro (HumaLOG) corrective regimen sliding scale   SubCutaneous three times a day before meals  norepinephrine Infusion 0.05 MICROgram(s)/kG/Min (3.89 mL/Hr) IV Continuous <Continuous>  vancomycin  IVPB 1000 milliGRAM(s) IV Intermittent every 12 hours    MEDICATIONS  (PRN):  dextrose 40% Gel 15 Gram(s) Oral once PRN Blood Glucose LESS THAN 70 milliGRAM(s)/deciliter  glucagon  Injectable 1 milliGRAM(s) IntraMuscular once PRN Glucose LESS THAN 70 milligrams/deciliter      LABS:                        9.4    12.09 )-----------( 132      ( 07 Oct 2020 12:39 )             30.6     Hgb Trend: 9.4<--  10-07    122<L>  |  88<L>  |  7   ----------------------------<  162<H>  3.0<L>   |  20<L>  |  0.37<L>    Ca    8.9      07 Oct 2020 12:39    TPro  6.7  /  Alb  3.3  /  TBili  1.2  /  DBili  x   /  AST  72<H>  /  ALT  66<H>  /  AlkPhos  44  10-07    Creatinine Trend: 0.37<--  PT/INR - ( 07 Oct 2020 12:39 )   PT: 17.6 sec;   INR: 1.50 ratio         PTT - ( 07 Oct 2020 12:39 )  PTT:32.6 sec  Urinalysis Basic - ( 07 Oct 2020 12:40 )    Color: Yellow / Appearance: Slightly Turbid / S.022 / pH: x  Gluc: x / Ketone: Trace  / Bili: Negative / Urobili: Negative   Blood: x / Protein: 100 / Nitrite: Negative   Leuk Esterase: Large / RBC: 3 /hpf / WBC 30 /HPF   Sq Epi: x / Non Sq Epi: x / Bacteria: x      Arterial Blood Gas:  10-07 @ 15:41  7.14/60/71/19/88/-9.0  ABG lactate: --    Venous Blood Gas:  10-07 @ 14:49  7.22/54/32/21/48  VBG Lactate: 2.8  Venous Blood Gas:  10-07 @ 12:39  7.25/54/31/23/47  VBG Lactate: 4.3    MICROBIOLOGY:     RADIOLOGY & ADDITIONAL TESTS:    < from: CT Chest No Cont (10.07.20 @ 15:24) >    FINDINGS:    LUNGS AND AIRWAYS: Low lung volumes. Elevated diaphragm. Bilateral consolidations in a posterior and dependent distribution.  PLEURA: No pleural effusion.  MEDIASTINUM AND ROSEMARY: No lymphadenopathy.  VESSELS: Aortic calcifications.  HEART: Heart size is normal. No pericardial effusion. Coronary artery calcifications  CHEST WALL AND LOWER NECK: Within normal limits.  VISUALIZED UPPER ABDOMEN: Within normal limits.  BONES: Within normal limits.    IMPRESSION:  Bilateral posterior and dependent consolidations, likely representing atelectasis.    < end of copied text >      ASSESSMENT/PLAN:  This is a 70/F w/ RA, DM, HTN, fibromyalgia, hypothyroidism, brain aneurysm s/p repair BIBEMS for AMS.  Found in respiratory distress likely 2/2 multifocal PNA.  Admitted to medicine. Intubated for hypoxemic respiratory failure, transferred to PICU.  r/o COVID-19.    #Neuro  - c/w medical sedation  - will call collateral for baseline MS    #Cardiovascular  - initially req Levophed but now hypertensive off of any pressor  - f/u EKG  - tachycardic in setting of infection  - pt actually hypertensive but will hold meds in setting of infection at this time    #Pulmonary  Acute Hypoxemic Respiratory Failure  - likely 2/2 multifocal PNA demonstrated on CT chest  - intubated, now on mechanical ventilation  - PRVC: 16/400, 5/100  - f/u post-intubation ABG    #FEN/GI  - NPO at this time  - f/u OGT placement  - will consider starting TF  - mild transaminitis, likely ISO infection; f/u legionella    #/Renal  Hyponatremia  - euvolemic on exam  - SOsm, UOsm, Opal, UCl  - TSH, AM cortisol    Hypokalemia  - replete PRN    #Endo  - c/w synthroid  - c/w stress dose steroid    #Heme  Leukocytosis  - possibly in setting of infection  - c/w abx    Normocytic anemia  - f/u iron studies, B12, folate, retic    #ID  multifocal PNA  - c/w Vanc, CTX, azithromycin  - f/u urine legionella  - f/u strep pneumo urine Ag  - obtain MRSA nose swab  - f/u COVID-19 PCR x2  - RVP    #Prophylaxis  - Lovenox    #Med Rec  - no admission med rec done    #Ethics  - FULL CODE    Greg Chau MD  [facility]  Internal Medicine, PGY2  [phone] MICU Accept Note    CHIEF COMPLAINT: AMS    HPI / INTERVAL HISTORY:  Patient is a 69 yo F with history of RA, DM, HTN, fibromyalgia, hypothyroidism, brain aneurysm s/p repair BIBEMS for shortness of breath and fever. Per EMS, patient has had 3 days of fever, Tmax 102.5 this morning 7-8 AM, no known COVID exposures, took tylenol at unknown time before being brought in. Symptoms started on Monday. Per EMS, vitals on scene were 114/70, RA 68 improved NRB to 100%. She also has not taken her meds in 2 days. Patient c/o feeling fatigued, short of breath. Denies chest pain. Patient uses a wheelchair at baseline 2/2 arthritis.    Seen with resp distress, hypoxia and tachycardia in ED.  Placed on BiPAP.  Continued to be hypoxic w/ AMS.  Intubated for hypoxemic respiratory failure likely 2/2 PNA.  CXR suspicious for novel corona virus PNA, will be accepted to PICU for COVID-19 r/o.    PAST MEDICAL & SURGICAL HISTORY:  Hypothyroid  Fibromyalgia  Rheumatoid arthritis  Diabetes    FAMILY HISTORY: unable to obtain, patient sedated    SOCIAL HISTORY:  unable to obtain, patient sedated     Allergies  pcn, pineapple (Unknown)  penicillin (Unknown)    REVIEW OF SYSTEMS:  unable to assess as pt is intubated and sedated    OBJECTIVE:  ICU Vital Signs Last 24 Hrs  T(C): 36.6 (07 Oct 2020 15:50), Max: 37.3 (07 Oct 2020 12:10)  T(F): 97.9 (07 Oct 2020 15:50), Max: 99.2 (07 Oct 2020 12:10)  HR: 95 (07 Oct 2020 17:09) (78 - 109)  BP: 158/77 (07 Oct 2020 17:09) (92/61 - 158/77)  RR: 16 (07 Oct 2020 17:09) (16 - 30)  SpO2: 100% (07 Oct 2020 17:09) (81% - 100%)    Mode: AC/ CMV (Assist Control/ Continuous Mandatory Ventilation), RR (machine): 16, TV (machine): 400, FiO2: 100, PEEP: 5, ITime: 1, MAP: 11, PIP: 24    CAPILLARY BLOOD GLUCOSE  POCT Blood Glucose.: 261 mg/dL (07 Oct 2020 15:31)      PHYSICAL EXAM:  General: medically sedated, elder  HEENT: intubated  Neck: soft, no LAD  Chest/Lungs: bibasilar rales, moving air well on mech vent  Heart: tachycardia to mid 90s; regular rhythm; systolic murmur left parasternal; no R/G  Abdomen: soft, non-tender, normal BS  Extremities: warm, well perfused; no edema noted  Skin: warm  Neuro: medically sedated  Psych: not assessed    LINES: 20G R forearm PIV; 20G left antecubital PIV and 22G left hand PIV    HOSPITAL MEDICATIONS:  MEDICATIONS  (STANDING):  cefTRIAXone   IVPB 1000 milliGRAM(s) IV Intermittent every 24 hours  chlorhexidine 0.12% Liquid 15 milliLiter(s) Oral Mucosa every 12 hours  dextrose 5%. 1000 milliLiter(s) (50 mL/Hr) IV Continuous <Continuous>  dextrose 50% Injectable 12.5 Gram(s) IV Push once  dextrose 50% Injectable 25 Gram(s) IV Push once  dextrose 50% Injectable 25 Gram(s) IV Push once  fentaNYL   Infusion... 0.5 MICROgram(s)/kG/Hr (1.04 mL/Hr) IV Continuous <Continuous>  heparin   Injectable 5000 Unit(s) SubCutaneous every 12 hours  hydrocortisone sodium succinate Injectable 100 milliGRAM(s) IV Push three times a day  insulin lispro (HumaLOG) corrective regimen sliding scale   SubCutaneous three times a day before meals  norepinephrine Infusion 0.05 MICROgram(s)/kG/Min (3.89 mL/Hr) IV Continuous <Continuous>  vancomycin  IVPB 1000 milliGRAM(s) IV Intermittent every 12 hours    MEDICATIONS  (PRN):  dextrose 40% Gel 15 Gram(s) Oral once PRN Blood Glucose LESS THAN 70 milliGRAM(s)/deciliter  glucagon  Injectable 1 milliGRAM(s) IntraMuscular once PRN Glucose LESS THAN 70 milligrams/deciliter      LABS:                        9.4    12.09 )-----------( 132      ( 07 Oct 2020 12:39 )             30.6     Hgb Trend: 9.4<--  1007    122<L>  |  88<L>  |  7   ----------------------------<  162<H>  3.0<L>   |  20<L>  |  0.37<L>    Ca    8.9      07 Oct 2020 12:39    TPro  6.7  /  Alb  3.3  /  TBili  1.2  /  DBili  x   /  AST  72<H>  /  ALT  66<H>  /  AlkPhos  44  10-07    Creatinine Trend: 0.37<--  PT/INR - ( 07 Oct 2020 12:39 )   PT: 17.6 sec;   INR: 1.50 ratio         PTT - ( 07 Oct 2020 12:39 )  PTT:32.6 sec  Urinalysis Basic - ( 07 Oct 2020 12:40 )    Color: Yellow / Appearance: Slightly Turbid / S.022 / pH: x  Gluc: x / Ketone: Trace  / Bili: Negative / Urobili: Negative   Blood: x / Protein: 100 / Nitrite: Negative   Leuk Esterase: Large / RBC: 3 /hpf / WBC 30 /HPF   Sq Epi: x / Non Sq Epi: x / Bacteria: x      Arterial Blood Gas:  10-07 @ 15:41  7.14/60/71/19/88/-9.0  ABG lactate: --    Venous Blood Gas:  10-07 @ 14:49  7.22/54/32/21/48  VBG Lactate: 2.8  Venous Blood Gas:  10-07 @ 12:39  7.25/54/31/23/47  VBG Lactate: 4.3    MICROBIOLOGY:     RADIOLOGY & ADDITIONAL TESTS:    < from: CT Chest No Cont (10.07.20 @ 15:24) >    FINDINGS:    LUNGS AND AIRWAYS: Low lung volumes. Elevated diaphragm. Bilateral consolidations in a posterior and dependent distribution.  PLEURA: No pleural effusion.  MEDIASTINUM AND ROSEMARY: No lymphadenopathy.  VESSELS: Aortic calcifications.  HEART: Heart size is normal. No pericardial effusion. Coronary artery calcifications  CHEST WALL AND LOWER NECK: Within normal limits.  VISUALIZED UPPER ABDOMEN: Within normal limits.  BONES: Within normal limits.    IMPRESSION:  Bilateral posterior and dependent consolidations, likely representing atelectasis.    < end of copied text >      ASSESSMENT/PLAN:  This is a 70/F w/ RA, DM, HTN, fibromyalgia, hypothyroidism, brain aneurysm s/p repair BIBEMS for AMS.  Found in respiratory distress likely 2/2 multifocal PNA.  Admitted to medicine. Intubated for hypoxemic respiratory failure, transferred to PICU.  r/o COVID-19.    #Neuro  - c/w medical sedation  - will call collateral for baseline MS    #Cardiovascular  - initially req Levophed but now hypertensive off of any pressor  - f/u EKG  - tachycardic in setting of infection  - pt actually hypertensive but will hold meds in setting of infection at this time    #Pulmonary  Acute Hypoxemic Respiratory Failure  - likely 2/2 multifocal PNA demonstrated on CT chest  - intubated, now on mechanical ventilation  - PRVC: 16/400, 5/100  - f/u post-intubation ABG    #FEN/GI  - NPO at this time  - f/u OGT placement  - will consider starting TF  - mild transaminitis, likely ISO infection; f/u legionella    #/Renal  Hyponatremia  - euvolemic on exam  - SOsm, UOsm, Opal, UCl  - TSH, AM cortisol    Hypokalemia  - replete PRN    #Endo  - c/w synthroid  - c/w stress dose steroid    #Heme  Leukocytosis  - possibly in setting of infection  - c/w abx    Normocytic anemia  - f/u iron studies, B12, folate, retic    #ID  multifocal PNA  - c/w Vanc, CTX, azithromycin  - f/u urine legionella  - f/u strep pneumo urine Ag  - obtain MRSA nose swab  - f/u COVID-19 PCR x2  - RVP    #Prophylaxis  - Lovenox    #Med Rec  - no admission med rec done    #Ethics  - FULL CODE    Greg Chau MD  [facility]  Internal Medicine, PGY2  [phone]

## 2020-10-07 NOTE — ED ADULT TRIAGE NOTE - PAIN RATING/NUMBER SCALE (0-10): REST
76F [facility] Parkinson's Disease, R. shoulder surgery (7da at [facility]) who was at rehab facility and experience acute onset shortness of breath 2/2 PNA. 10

## 2020-10-07 NOTE — RAPID RESPONSE TEAM SUMMARY - NSSITUATIONBACKGROUNDRRT_GEN_ALL_CORE
69 yo F with history of RA, DM, HTN, fibromyalgia, hypothyroidism, brain aneurysm s/p repair BIBEMS for shortness of breath and fever. Per EMS, patient has had 3 days of fever, Tmax 102.5 this morning 7-8 AM, no known COVID exposures, took tylenol at unknown time before being brought in. Symptoms started on Monday. Per EMS, vitals on scene were 114/70, RA 68 improved NRB to 100%. CT scan showed LLL PNA and GGOs c/f r/o COVID. First COVID swab negative.     RRT called at approximately 4:20 PM for hypoxic respiratory failure, patient placed on BIPap in ED. Considering intubation. Patient was AAOx0 at bedside but was rousable to pain, lethatgic. Patient saturating well on BIPAP but given persistent respiratory failure and waning mental status decision was made to intubate; family contacted and she is full code. Patient successfully intubated, levo gtt started for hypotension. MICU consulted and accepted patient.

## 2020-10-07 NOTE — ED ADULT NURSE REASSESSMENT NOTE - NS ED NURSE REASSESS COMMENT FT1
Patient straight cathed for urine using sterile technique. Second RN present to confirm sterility. Explained procedure as it was being done - Pt tolerated procedure well. Sterile specimens collected and sent to lab as ordered. Comfort and safety provided.

## 2020-10-07 NOTE — ED PROVIDER NOTE - CLINICAL SUMMARY MEDICAL DECISION MAKING FREE TEXT BOX
Magui YBARRA: p/w shortness of breath, fever x 3 days. hypoxic on EMS arrival. plan to r/o COVID/ pneumonia. Patient 92% on RA here. plan for sepsis work up, Abx, COVID swab and admission. Magui YBARRA: p/w shortness of breath, fever x 3 days. hypoxic on EMS arrival. plan to r/o COVID/ pneumonia. Patient 92% on RA here. plan for sepsis work up, Abx, COVID swab and admission.   Patient deteriorated while in ED, placed in bipap and then intubated. Admission upgraded.

## 2020-10-07 NOTE — ED PROVIDER NOTE - PROGRESS NOTE DETAILS
Magui YBARRA: Discussed history and clinical course  with patient's daughter and son. Called [phone]. Magui YBARRA: Discussed care with patient's family member, Dr. Woods. Dr. Daniel saw patient in the ED as well. Pt returned from CT scan of the chest alerted and not breathing well.  Not speaking but nodding "yes" when asked if she is not breathing well.  Placed on non re-breather and O2 responded well, pulmonary at the bedside asking for BIPAP.  Will start BIPAP and send ABG.  Jerald Amarjit:Patient not improving on bipap. falling asleep on bipap, decrease in mental status. will intubate. given phenylephrine, started on levophed as bp is 80/40.  given ketamine and johnny for intubation. intubated, post xray confirmed good placement. micu at bedside. RRT called and medicine team at bedside.

## 2020-10-07 NOTE — CONSULT NOTE ADULT - SUBJECTIVE AND OBJECTIVE BOX
HPI:   Patient is a 70y female with  a past history of RA, fibromyalgia, DM, HTN, HLD, and CKD who was brought to ER with 3 days of fever, cough, and shortness of breath.  Her last hospital stay was in 2018.She takes embrel and prednisone for her RA.The dose of prednisone is not known to me.She also reportedly has a history of brain aneurysm surgery.No one ill at home.She is dyspneic, has trouble speaking in full sentences.She reports mild headaches.She has been given vanco and azithro in ER.No acute active joints.she was hypoxic in ER and has improved with supplemental oxygen    REVIEW OF SYSTEMS:  All other review of systems negative (Comprehensive ROS): limited mobility due to joint pain    PAST MEDICAL & SURGICAL HISTORY:  Hypothyroid    Fibromyalgia    Rheumatoid arthritis    Diabetes        Allergies    pcn, pineapple (Unknown)  penicillin (Unknown)    Intolerances        Antimicrobials Day #  :  azithromycin  IVPB 500 milliGRAM(s) IV Intermittent once  vancomycin  IVPB 1000 milliGRAM(s) IV Intermittent once    Other Medications:      FAMILY HISTORY:      SOCIAL HISTORY:  Smoking: x    ETOH: x    Drug Use: x        T(F): 99.2 (10-07-20 @ 12:10), Max: 99.2 (10-07-20 @ 12:10)  HR: 101 (10-07-20 @ 13:24)  BP: 118/60 (10-07-20 @ 13:24)  RR: 28 (10-07-20 @ 13:24)  SpO2: 99% (10-07-20 @ 13:24)  Wt(kg): --    PHYSICAL EXAM:  General: alert, moderate respiratory distress  Eyes:  anicteric, no conjunctival injection, no discharge  Oropharynx: no lesions or injection 	  Neck: supple, without adenopathy  Lungs: diminished at bases  Heart: regular rate and rhythm; no murmur,   Abdomen: soft, nondistended, nontender, without mass or organomegaly  Skin: no lesions  Extremities: no clubbing, cyanosis, or edema  Neurologic: alert, oriented, moves all extremities    LAB RESULTS:                        9.4    12.09 )-----------( 132      ( 07 Oct 2020 12:39 )             30.6     10-07    122<L>  |  88<L>  |  7   ----------------------------<  162<H>  3.0<L>   |  20<L>  |  0.37<L>    Ca    8.9      07 Oct 2020 12:39    TPro  6.7  /  Alb  3.3  /  TBili  1.2  /  DBili  x   /  AST  72<H>  /  ALT  66<H>  /  AlkPhos  44  10-07    LIVER FUNCTIONS - ( 07 Oct 2020 12:39 )  Alb: 3.3 g/dL / Pro: 6.7 g/dL / ALK PHOS: 44 U/L / ALT: 66 U/L / AST: 72 U/L / GGT: x           Urinalysis Basic - ( 07 Oct 2020 12:40 )    Color: Yellow / Appearance: Slightly Turbid / S.022 / pH: x  Gluc: x / Ketone: Trace  / Bili: Negative / Urobili: Negative   Blood: x / Protein: 100 / Nitrite: Negative   Leuk Esterase: Large / RBC: 3 /hpf / WBC 30 /HPF   Sq Epi: x / Non Sq Epi: x / Bacteria: x        MICROBIOLOGY:  RECENT CULTURES:    Covid pending    RADIOLOGY REVIEWED:  CXR : await official reading, appears to have B/L effusions vs blunting at bases and LLL infiltrate

## 2020-10-07 NOTE — H&P ADULT - PROBLEM SELECTOR PLAN 1
CARMEN Zamudio
IV fluids given. ID eval called. She could have UTI as well. Will DERRICK teague. She appears to be critically ill with severe sepsis, with tachycardia, hypoxia

## 2020-10-07 NOTE — CHART NOTE - NSCHARTNOTEFT_GEN_A_CORE
Pt is a 70yr old woman with PMhx including HTN, DM, rheumatoid arthritis, hypothyroidism and fibromyalgia who presented to the ER on 10/7 with chief complaint of SOB and fever and was admitted with acute hypoxic respiratory failure requiring intubation and electrolyte abnormalities.     Pt's covid-19 tests have been negative x2.       Vitals: 156/81, HR 70s, pulse ox 100% on vent ACVC 22/400/35/5.   Neuro: Off sedation since 1945, no reaction to painful stimuli, negative gag/carinal. Negative doll's, pupils equal round and reactive to light. Discussed with Dr. Escudero, no intervention at this time.   Pulm: Clear to auscultation, no secretions noted on suction  Cardiac: s1s2, 5mg IVP lopressor given at approx 2115 for hypertension  Ab: Softly distended, hypoactive bowel sounds, +NGT  Renal: Mcbride with clear yellow output, to be pulled and changed to permafit.   Ex: +pedal pulses, no BLE edema appreciated    Pt being managed for pneumonia, on ceftriaxone/azithromycin/vanc, pending MRSA swab. S/P repletion for hypokalemia/hypomagnesemia, pending repeat labs. Pending urine/serum osmolality for hyponatremia workup, continue serial bmp.     Case, plan discussed with Dr. Schaefer who accepted pt to MICU.     Pt's daughter, Marysol ([phone]) and brother-in-law Dr. Woods ([phone]) updated and aware of pt transfer. MICU Transfer Note    Transfer from: MICU    Transfer to: (  x) Medicine    (  ) Telemetry     (   ) RCU        (    ) Palliative         (   ) Stroke Unit          (   ) __________________    Accepting physican:      MICU COURSE:    Pt is a 70yr old woman with PMhx including HTN, DM, rheumatoid arthritis, hypothyroidism and fibromyalgia who presented to the ER on 10/7 with chief complaint of SOB and fever and was admitted with acute hypoxic respiratory failure requiring intubation and electrolyte abnormalities; 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       ASSESSMENT & PLAN:     Patient is a 71yo F w/ RA, DM, HTN, fibromyalgia, hypothyroidism, brain aneurysm s/p repair BIBEMS for AMS brought to MICU for intubation for respiratory distress likely 2/2 multifocal PNA vs atelectasis vs bacteremia, now found to be bacteremic with listeria and on meropenem.      #Neuro   - mental status at the baseline    #Cardiovascular  - persistently hypertensive, patient takes ramipril at home. Now, on lisinopril . additional add it; labetalol 200 TID   -monitor VS ; now -120's       #Pulmonary  Acute Hypoxemic Respiratory Failure; resolved. pt was extubated to RA   - likely 2/2 atelectasis demonstrated on CT chest    #FEN/GI  - Tolerated regular diet.   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     Normocytic anemia  - Stable  - f/u iron studies, B12, folate, retic  - pt can't take iron due to constipation    #ID  Blood cultures grew listeria, concern for meningitis   - c/w meropenem for now, pt gets hives from penicillin  - Repeat cultures neg on 10/9/2020. 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        #Ethics  - FULL CODE      For Followup:  -will require abx tx for ~ 3 weeks total.       Vital Signs Last 24 Hrs  T(C): 36.5 (11 Oct 2020 01:00), Max: 36.8 (10 Oct 2020 15:00)  T(F): 97.7 (11 Oct 2020 01:00), Max: 98.2 (10 Oct 2020 15:00)  HR: 66 (11 Oct 2020 02:00) (66 - 103)  BP: 113/59 (11 Oct 2020 02:00) (107/58 - 203/85)  BP(mean): 78 (11 Oct 2020 02:00) (76 - 132)  RR: 24 (11 Oct 2020 02:00) (19 - 45)  SpO2: 99% (11 Oct 2020 02:00) (96% - 100%)  I&O's Summary    09 Oct 2020 07:01  -  10 Oct 2020 07:00  --------------------------------------------------------  IN: 2945 mL / OUT: 750 mL / NET: 2195 mL    10 Oct 2020 07:01  -  11 Oct 2020 03:41  --------------------------------------------------------  IN: 1215 mL / OUT: 700 mL / NET: 515 mL        MEDICATIONS  (STANDING):  artificial  tears Solution 1 Drop(s) Both EYES two times a day  cycloSPORINE (RESTASIS) 0.05% Emulsion 1 Drop(s) Both EYES two times a day  dextrose 5%. 1000 milliLiter(s) (50 mL/Hr) IV Continuous <Continuous>  dextrose 50% Injectable 12.5 Gram(s) IV Push once  dextrose 50% Injectable 25 Gram(s) IV Push once  dextrose 50% Injectable 25 Gram(s) IV Push once  fondaparinux Injectable 2.5 milliGRAM(s) SubCutaneous daily  insulin lispro (HumaLOG) corrective regimen sliding scale   SubCutaneous three times a day before meals  insulin lispro (HumaLOG) corrective regimen sliding scale   SubCutaneous at bedtime  labetalol 200 milliGRAM(s) Oral three times a day  levothyroxine Injectable 50 MICROGram(s) IV Push at bedtime  lisinopril 20 milliGRAM(s) Oral daily  meropenem  IVPB 2000 milliGRAM(s) IV Intermittent every 8 hours  methylPREDNISolone sodium succinate Injectable 8 milliGRAM(s) IV Push daily  potassium phosphate IVPB 15 milliMole(s) IV Intermittent once  prednisoLONE acetate 1% Suspension 1 Drop(s) Both EYES two times a day    MEDICATIONS  (PRN):  dextrose 40% Gel 15 Gram(s) Oral once PRN Blood Glucose LESS THAN 70 milliGRAM(s)/deciliter  glucagon  Injectable 1 milliGRAM(s) IntraMuscular once PRN Glucose LESS THAN 70 milligrams/deciliter  hydrALAZINE Injectable 5 milliGRAM(s) IV Push every 6 hours PRN SBP >170  simethicone 80 milliGRAM(s) Chew three times a day PRN Gas        LABS                                            7.1                   Neurophils% (auto):   x      (10-11 @ 00:43):    2.28 )-----------(126          Lymphocytes% (auto):  x                                             23.5                   Eosinphils% (auto):   x        Manual%: Neutrophils x    ; Lymphocytes x    ; Eosinophils x    ; Bands%: x    ; Blasts x                                    130    |  98     |  7                   Calcium: 8.3   / iCa: x      (10-11 @ 00:43)    ----------------------------<  236       Magnesium: 1.8                              3.7     |  22     |  <0.30            Phosphorous: 2.0      TPro  5.5    /  Alb  2.8    /  TBili  0.3    /  DBili  x      /  AST  14     /  ALT  30     /  AlkPhos  41     11 Oct 2020 00:43    ( 10-11 @ 00:42 )   PT: 14.5 sec;   INR: 1.22 ratio  aPTT: 27.9 sec      Emili Man [facility]   [phone] MICU Transfer Note    Transfer from: MICU    Transfer to: (  x) Medicine    (  ) Telemetry     (   ) RCU        (    ) Palliative         (   ) Stroke Unit          (   ) __________________    Accepting physican: Jhonny Daniel)      MICU COURSE:    Pt is a 70yr old woman with PMhx including HTN, DM, rheumatoid arthritis, hypothyroidism and fibromyalgia who presented to the ER on 10/7 with chief complaint of SOB and fever and was admitted with acute hypoxic respiratory failure requiring intubation and electrolyte abnormalities; 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       ASSESSMENT & PLAN:     Patient is a 69yo F w/ RA, DM, HTN, fibromyalgia, hypothyroidism, brain aneurysm s/p repair BIBEMS for AMS brought to MICU for intubation for respiratory distress likely 2/2 multifocal PNA vs atelectasis vs bacteremia, now found to be bacteremic with listeria and on meropenem.      #Neuro   - mental status at the baseline    #Cardiovascular  - persistently hypertensive, patient takes ramipril at home. Now, on lisinopril . additional add it; labetalol 200 TID   -monitor VS ; now -120's       #Pulmonary  Acute Hypoxemic Respiratory Failure; resolved. pt was extubated to RA   - likely 2/2 atelectasis demonstrated on CT chest    #FEN/GI  - Tolerated regular diet.   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     Normocytic anemia  - Stable  - f/u iron studies, B12, folate, retic  - pt can't take iron due to constipation    #ID  Blood cultures grew listeria, concern for meningitis   - c/w meropenem for now, pt gets hives from penicillin  - Repeat cultures neg on 10/9/2020. 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        #Ethics  - FULL CODE      For Followup:  -will require abx tx for ~ 3 weeks total.       Vital Signs Last 24 Hrs  T(C): 36.5 (11 Oct 2020 01:00), Max: 36.8 (10 Oct 2020 15:00)  T(F): 97.7 (11 Oct 2020 01:00), Max: 98.2 (10 Oct 2020 15:00)  HR: 66 (11 Oct 2020 02:00) (66 - 103)  BP: 113/59 (11 Oct 2020 02:00) (107/58 - 203/85)  BP(mean): 78 (11 Oct 2020 02:00) (76 - 132)  RR: 24 (11 Oct 2020 02:00) (19 - 45)  SpO2: 99% (11 Oct 2020 02:00) (96% - 100%)  I&O's Summary    09 Oct 2020 07:01  -  10 Oct 2020 07:00  --------------------------------------------------------  IN: 2945 mL / OUT: 750 mL / NET: 2195 mL    10 Oct 2020 07:01  -  11 Oct 2020 03:41  --------------------------------------------------------  IN: 1215 mL / OUT: 700 mL / NET: 515 mL        MEDICATIONS  (STANDING):  artificial  tears Solution 1 Drop(s) Both EYES two times a day  cycloSPORINE (RESTASIS) 0.05% Emulsion 1 Drop(s) Both EYES two times a day  dextrose 5%. 1000 milliLiter(s) (50 mL/Hr) IV Continuous <Continuous>  dextrose 50% Injectable 12.5 Gram(s) IV Push once  dextrose 50% Injectable 25 Gram(s) IV Push once  dextrose 50% Injectable 25 Gram(s) IV Push once  fondaparinux Injectable 2.5 milliGRAM(s) SubCutaneous daily  insulin lispro (HumaLOG) corrective regimen sliding scale   SubCutaneous three times a day before meals  insulin lispro (HumaLOG) corrective regimen sliding scale   SubCutaneous at bedtime  labetalol 200 milliGRAM(s) Oral three times a day  levothyroxine Injectable 50 MICROGram(s) IV Push at bedtime  lisinopril 20 milliGRAM(s) Oral daily  meropenem  IVPB 2000 milliGRAM(s) IV Intermittent every 8 hours  methylPREDNISolone sodium succinate Injectable 8 milliGRAM(s) IV Push daily  potassium phosphate IVPB 15 milliMole(s) IV Intermittent once  prednisoLONE acetate 1% Suspension 1 Drop(s) Both EYES two times a day    MEDICATIONS  (PRN):  dextrose 40% Gel 15 Gram(s) Oral once PRN Blood Glucose LESS THAN 70 milliGRAM(s)/deciliter  glucagon  Injectable 1 milliGRAM(s) IntraMuscular once PRN Glucose LESS THAN 70 milligrams/deciliter  hydrALAZINE Injectable 5 milliGRAM(s) IV Push every 6 hours PRN SBP >170  simethicone 80 milliGRAM(s) Chew three times a day PRN Gas        LABS                                            7.1                   Neurophils% (auto):   x      (10-11 @ 00:43):    2.28 )-----------(126          Lymphocytes% (auto):  x                                             23.5                   Eosinphils% (auto):   x        Manual%: Neutrophils x    ; Lymphocytes x    ; Eosinophils x    ; Bands%: x    ; Blasts x                                    130    |  98     |  7                   Calcium: 8.3   / iCa: x      (10-11 @ 00:43)    ----------------------------<  236       Magnesium: 1.8                              3.7     |  22     |  <0.30            Phosphorous: 2.0      TPro  5.5    /  Alb  2.8    /  TBili  0.3    /  DBili  x      /  AST  14     /  ALT  30     /  AlkPhos  41     11 Oct 2020 00:43    ( 10-11 @ 00:42 )   PT: 14.5 sec;   INR: 1.22 ratio  aPTT: 27.9 sec      Emili Man [facility]   [phone]

## 2020-10-07 NOTE — CONSULT NOTE ADULT - PROBLEM SELECTOR RECOMMENDATION 9
with hypoxia and likely   -Pt labored and with AMS despite bipap. Suggest mechanical ventilation and MICU eval.  -F/u ABG  -Keep pH >7.20  -Keep sats >90% with hypoxia and likely   -Pt labored and with AMS despite bipap. Suggest intubation and mechanical ventilation and MICU eval.  -F/u ABG  -Keep pH >7.20  -Keep sats >90% with hypoxia and likely   -Pt labored and with AMS despite bipap. Suggest intubation and mechanical ventilation and MICU eval.  -F/u ABG  -Keep pH >7.20  -Keep sats >90%  -f/u covid testing

## 2020-10-07 NOTE — ED ADULT NURSE REASSESSMENT NOTE - NS ED NURSE REASSESS COMMENT FT1
Pt. on 5 L NC  sating %- tachypneic to high 20s rr/min. Pt. stating she is still having difficulty breathing. MD aware at bedside. Echo being performed.

## 2020-10-07 NOTE — CONSULT NOTE ADULT - ASSESSMENT
70 yo female with RA , DM,HTN,fibromyalgia, and remote repair of cerebral aneurysm now admitted with fever,weakness and hypoxia.  She appears to have severe sepsis, suspect from a CAP.  Strep pneumo would be the most likely etiology but she should be covered broadly pending w/u.  Nature of PCN allergy not clear, may be reasonable to use CTX as part of regimen.  Suggest:  1.CTX 1 gr q24  2.azithro 500 daily for atypical coverage pending legionella urine antigen  3.Blood cultures x 2 sets, urine culture, pneumococcal urinary antigen if available  4.Vanco pending blood cultures given increased staph infections with RA  5.stress steroids  6.Additional w/u pending course

## 2020-10-07 NOTE — CHART NOTE - NSCHARTNOTEFT_GEN_A_CORE
10/7/2020     1633    Critical Care Note--Medicine Attending    See Rapid Response Team sheet for full detail, assessment and plan.  I personally provided 40 minutes of critical care time for delirium and katie respiratory failure in the setting of this 69 y/o F with a history of RA with multifocal pneumonia and currently in process of r/o SARS-CoV2 apparently just returned from CTT suite for chest study--evaluated with beside assessment with patient initially placed by the ER physicians on BiPAP but patient with delirium from respiratory failure, ABG performed but patient intubated for airway protection in the ER.  Would broaden IV antibiotics and patient now for admission to the MICU.   Family updated by primary covering NP/PA, aware and agrees with plan/care as above.   Patient's primary attending also notified by covering NP/PA.  Given patient's comorbidities, patient's long term prognosis is guarded.   Care reviewed with house staff and covering NP/PA.   Care as per MICU.    Marvin Cuellar MD  [phone] 10/7/2020     1633    Critical Care Note--Medicine Attending    See Rapid Response Team sheet for full detail, assessment and plan.  I personally provided 40 minutes of critical care time for delirium and katie respiratory failure in the setting of this 69 y/o F--UNKNOWN to me previously--with a history of RA with multifocal pneumonia and currently in process of r/o SARS-CoV2 apparently just returned from CTT suite for chest study--evaluated with beside assessment with patient initially placed by the ER physicians on BiPAP but patient with delirium from respiratory failure, ABG performed but patient intubated for airway protection in the ER.  Would broaden IV antibiotics and patient now for admission to the MICU.   Family updated by primary covering NP/PA, aware and agrees with plan/care as above.   Patient's primary attending also notified by covering NP/PA.  Given patient's comorbidities, patient's long term prognosis is guarded.   Care reviewed with house staff and covering NP/PA.   Care as per MICU.    Marvin Cuellar MD  [phone]

## 2020-10-07 NOTE — ED ADULT NURSE NOTE - OBJECTIVE STATEMENT
70 y.o F A&OX3 with PMH of hypothyroid, fibromyalgia, RA, DM, presents to the ED via EMS from home c/o shortness of breath and fever. As per EMS pt. had 102 fever at home. Was given tylenol by family prior to arrival. 70 y.o F A&OX3 with PMH of hypothyroid, fibromyalgia, RA, DM, presents to the ED via EMS from home c/o shortness of breath and fever. As per EMS pt. had 102 fever at home. Was given Tylenol by family prior to arrival. As per EMS, symptoms have been ongoing for 3 days. Highest temp of 102.5. As per EMS, pt. was sating 68% on RA when they arrived. Placed on non rebreather mask and improved to 90s. Upon assessment, pt. tachypneic to high 20s rr/min. Accessory muscle use noted. Rectal temp of 99.2 noted. Blanchable redness noted to sacral region. Pt. lower extremities and hands appear mottled - MD aware at bedside. Pt. states she is having difficulty breathing and feels fatigued. EKG done. Pt. placed on CM. Safety and comfort provided. 70 y.o F A&OX3 with PMH of hypothyroid, fibromyalgia, RA, DM, presents to the ED via EMS from home c/o shortness of breath and fever. As per EMS pt. had 102 fever at home. Was given Tylenol by family prior to arrival. As per EMS, symptoms have been ongoing for 3 days. Highest temp of 102.5. As per EMS, pt. was sating 68% on RA when they arrived. Placed on non rebreather mask and improved to 90s. Upon assessment, pt. tachypneic to high 20s/30s rr/min. Accessory muscle use noted. Rectal temp of 99.2 noted. Blanchable redness noted to sacral region. Pt. lower extremities and hands appear mottled - MD aware at bedside. Pt. states she is having difficulty breathing and feels fatigued. Pt. appears lethargic and is having difficulty completing sentences due to tachypnea. EKG done. Pt. placed on CM. Safety and comfort provided.

## 2020-10-07 NOTE — H&P ADULT - HISTORY OF PRESENT ILLNESS
Patient is a 69 yo F with history of RA, DM, HTN, fibromyalgia, hypothyroidism, brain aneurysm s/p repair BIBEMS for shortness of breath and fever. Per EMS, patient has had 3 days of fever, Tmax 102.5 this morning 7-8 AM, no known COVID exposures, took tylenol at unknown time before being brought in. Symptoms started on Monday. Per EMS, vitals on scene were 114/70, RA 68 improved NRB to 100%. She also has not taken her meds in 2 days. Patient c/o feeling fatigued, short of breath. Denies chest pain. Patient uses a wheelchair at baseline 2/2 arthritis.  Seen with resp distress, hypoxia and tachycardia in ED

## 2020-10-07 NOTE — ED ADULT NURSE NOTE - NSIMPLEMENTINTERV_GEN_ALL_ED
Implemented All Fall Risk Interventions:  [place] to call system. Call bell, personal items and telephone within reach. Instruct patient to call for assistance. Room bathroom lighting operational. Non-slip footwear when patient is off stretcher. Physically safe environment: no spills, clutter or unnecessary equipment. Stretcher in lowest position, wheels locked, appropriate side rails in place. Provide visual cue, wrist band, yellow gown, etc. Monitor gait and stability. Monitor for mental status changes and reorient to person, place, and time. Review medications for side effects contributing to fall risk. Reinforce activity limits and safety measures with patient and family.

## 2020-10-07 NOTE — ED ADULT NURSE REASSESSMENT NOTE - NS ED NURSE REASSESS COMMENT FT1
RRT activated at this time. Pt. returned from CT in worsening respiratory distress and more lethargic and was unable to answer questions. Oxygen saturation 87% on NC and placed on non rebreather. ED MD's made aware and placed pt. on BiPAP at bedside. RRT team arrived at 1550. ABG sent. pt. was not improving on Bipap - decision to intubate was made by team and pt. intubated at 1611. Pt. oxygen remained 100%. Intubation was successful after one attempt. Tube 7.5 Finnish and 21 at the lip. Pos. color change noted. Bilateral breath sounds noted. Levophed and fentanyl drip started. Pt. awaiting dispo to ICU.

## 2020-10-07 NOTE — CONSULT NOTE ADULT - SUBJECTIVE AND OBJECTIVE BOX
PULMONARY CONSULT    HPI: 69 y/o F with PMH of RA, DM, HTN, fibromyalgia, hypothyroidism, brain aneurysm. Presents to ED with SOB and fever. Reports 3 days of fever, Tmax 102.5 this morning, no known COVID exposures. Found to be hypoxic by EMS and placed on 100% NRB. CXR with LLL opacity. Seen in ED - as per nurse pt went to CT chest and came back with increased WOB and altered mental status. ROS unable to be obtained 2nd to mental status.       PAST MEDICAL & SURGICAL HISTORY:  Hypothyroid  Fibromyalgia  Rheumatoid arthritis  Diabetes    Allergies  pcn, pineapple (Unknown)    FAMILY HISTORY: non contributory     Social history: unknown     Review of Systems: unable to obtain 2nd to mental status    Medications:  MEDICATIONS  (STANDING):  cefTRIAXone   IVPB 1000 milliGRAM(s) IV Intermittent every 24 hours  dextrose 5%. 1000 milliLiter(s) (50 mL/Hr) IV Continuous <Continuous>  dextrose 50% Injectable 12.5 Gram(s) IV Push once  dextrose 50% Injectable 25 Gram(s) IV Push once  dextrose 50% Injectable 25 Gram(s) IV Push once  heparin   Injectable 5000 Unit(s) SubCutaneous every 12 hours  hydrocortisone sodium succinate Injectable 100 milliGRAM(s) IV Push three times a day  insulin lispro (HumaLOG) corrective regimen sliding scale   SubCutaneous three times a day before meals  vancomycin  IVPB 1000 milliGRAM(s) IV Intermittent once  vancomycin  IVPB 1000 milliGRAM(s) IV Intermittent every 12 hours    MEDICATIONS  (PRN):  dextrose 40% Gel 15 Gram(s) Oral once PRN Blood Glucose LESS THAN 70 milliGRAM(s)/deciliter  glucagon  Injectable 1 milliGRAM(s) IntraMuscular once PRN Glucose LESS THAN 70 milligrams/deciliter            Vital Signs Last 24 Hrs  T(C): 36.6 (07 Oct 2020 14:44), Max: 37.3 (07 Oct 2020 12:10)  T(F): 97.9 (07 Oct 2020 14:44), Max: 99.2 (07 Oct 2020 12:10)  HR: 94 (07 Oct 2020 15:00) (93 - 109)  BP: 119/71 (07 Oct 2020 15:00) (101/76 - 129/73)  BP(mean): --  RR: 29 (07 Oct 2020 15:00) (24 - 30)  SpO2: 98% (07 Oct 2020 15:00) (81% - 100%)      VBG pH 7.22 10-07 @ 14:49  VBG pCO2 54 10-07 @ 14:49  VBG O2 sat 48 10-07 @ 14:49  VBG lactate 2.8 10-07 @ 14:49  VBG pH 7.25 10-07 @ 12:39  VBG pCO2 54 10-07 @ 12:39  VBG O2 sat 47 10-07 @ 12:39  VBG lactate 4.3 10-07 @ 12:39        LABS:                        9.4    12.09 )-----------( 132      ( 07 Oct 2020 12:39 )             30.6     10-07    122<L>  |  88<L>  |  7   ----------------------------<  162<H>  3.0<L>   |  20<L>  |  0.37<L>    Ca    8.9      07 Oct 2020 12:39    TPro  6.7  /  Alb  3.3  /  TBili  1.2  /  DBili  x   /  AST  72<H>  /  ALT  66<H>  /  AlkPhos  44  10-07          CAPILLARY BLOOD GLUCOSE      POCT Blood Glucose.: 261 mg/dL (07 Oct 2020 15:31)    PT/INR - ( 07 Oct 2020 12:39 )   PT: 17.6 sec;   INR: 1.50 ratio         PTT - ( 07 Oct 2020 12:39 )  PTT:32.6 sec  Urinalysis Basic - ( 07 Oct 2020 12:40 )    Color: Yellow / Appearance: Slightly Turbid / S.022 / pH: x  Gluc: x / Ketone: Trace  / Bili: Negative / Urobili: Negative   Blood: x / Protein: 100 / Nitrite: Negative   Leuk Esterase: Large / RBC: 3 /hpf / WBC 30 /HPF   Sq Epi: x / Non Sq Epi: x / Bacteria: x        Physical Examination:    General: No acute distress.      HEENT: Pupils equal, reactive to light.  Symmetric.    PULM: diminished BS    CVS: RRR    ABD: Soft, nondistended, nontender, normoactive bowel sounds, no masses    EXT: No edema, nontender    SKIN: Warm and well perfused, no rashes noted.    NEURO: lethargic, not following commands       RADIOLOGY REVIEWED  CT chest: L sided opacities - f/u official report

## 2020-10-07 NOTE — ED PROVIDER NOTE - OBJECTIVE STATEMENT
RA 70, 114/70, improved NRB to 100%  3 days of fever, max 102 last night, no exposures, received tylenol  RA, DM, fibromyalgia, hypothyroidism, brain aneurysm s/p repair  not taken meds for past 2 days  sacral wound Magui YBARRA: Patient is a 71 yo F with history of RA, DM, fibromyalgia, hypothyroidism, brain aneurysm s/p repair BIBEMS for shortness of breath and fever. Per EMS, patient has had 3 days of fever, Tmax 102 last night, no known COVID exposures, took tylenol at unknown time before being brought in. Per EMS, vitals on scene were 114/70, RA 68 improved NRB to 100%. She also has not taken her meds in 2 days. Patient c/o feeling fatigued, short of breath. Denies chest pain. Magui YBARRA: Patient is a 71 yo F with history of RA, DM, HTN, fibromyalgia, hypothyroidism, brain aneurysm s/p repair BIBEMS for shortness of breath and fever. Per EMS, patient has had 3 days of fever, Tmax 102.5 this morning 7-8 AM, no known COVID exposures, took tylenol at unknown time before being brought in. Symptoms started on Monday. Per EMS, vitals on scene were 114/70, RA 68 improved NRB to 100%. She also has not taken her meds in 2 days. Patient c/o feeling fatigued, short of breath. Denies chest pain. Patient uses a wheelchair at baseline 2/2 arthritis. Last hospitalization was in 2018 in [facility].   Missed Enbrel injection this week. Last dose of prednisone on Monday.   pcp: Dr. Jhonny Daniel

## 2020-10-08 LAB
ALBUMIN SERPL ELPH-MCNC: 2.6 G/DL — LOW (ref 3.3–5)
ALBUMIN SERPL ELPH-MCNC: 3 G/DL — LOW (ref 3.3–5)
ALP SERPL-CCNC: 43 U/L — SIGNIFICANT CHANGE UP (ref 40–120)
ALP SERPL-CCNC: 48 U/L — SIGNIFICANT CHANGE UP (ref 40–120)
ALT FLD-CCNC: 63 U/L — HIGH (ref 10–45)
ALT FLD-CCNC: 71 U/L — HIGH (ref 10–45)
ANION GAP SERPL CALC-SCNC: 11 MMOL/L — SIGNIFICANT CHANGE UP (ref 5–17)
ANION GAP SERPL CALC-SCNC: 14 MMOL/L — SIGNIFICANT CHANGE UP (ref 5–17)
ANION GAP SERPL CALC-SCNC: 9 MMOL/L — SIGNIFICANT CHANGE UP (ref 5–17)
APTT BLD: 35.7 SEC — HIGH (ref 27.5–35.5)
AST SERPL-CCNC: 63 U/L — HIGH (ref 10–40)
AST SERPL-CCNC: 68 U/L — HIGH (ref 10–40)
BILIRUB SERPL-MCNC: 0.5 MG/DL — SIGNIFICANT CHANGE UP (ref 0.2–1.2)
BILIRUB SERPL-MCNC: 1 MG/DL — SIGNIFICANT CHANGE UP (ref 0.2–1.2)
BUN SERPL-MCNC: 8 MG/DL — SIGNIFICANT CHANGE UP (ref 7–23)
BUN SERPL-MCNC: 8 MG/DL — SIGNIFICANT CHANGE UP (ref 7–23)
BUN SERPL-MCNC: 9 MG/DL — SIGNIFICANT CHANGE UP (ref 7–23)
CALCIUM SERPL-MCNC: 8.4 MG/DL — SIGNIFICANT CHANGE UP (ref 8.4–10.5)
CALCIUM SERPL-MCNC: 8.4 MG/DL — SIGNIFICANT CHANGE UP (ref 8.4–10.5)
CALCIUM SERPL-MCNC: 8.8 MG/DL — SIGNIFICANT CHANGE UP (ref 8.4–10.5)
CHLORIDE SERPL-SCNC: 100 MMOL/L — SIGNIFICANT CHANGE UP (ref 96–108)
CHLORIDE SERPL-SCNC: 101 MMOL/L — SIGNIFICANT CHANGE UP (ref 96–108)
CHLORIDE SERPL-SCNC: 90 MMOL/L — LOW (ref 96–108)
CO2 SERPL-SCNC: 15 MMOL/L — LOW (ref 22–31)
CO2 SERPL-SCNC: 16 MMOL/L — LOW (ref 22–31)
CO2 SERPL-SCNC: 16 MMOL/L — LOW (ref 22–31)
CREAT SERPL-MCNC: <0.3 MG/DL — LOW (ref 0.5–1.3)
GAS PNL BLDA: SIGNIFICANT CHANGE UP
GAS PNL BLDA: SIGNIFICANT CHANGE UP
GLUCOSE BLDC GLUCOMTR-MCNC: 151 MG/DL — HIGH (ref 70–99)
GLUCOSE BLDC GLUCOMTR-MCNC: 178 MG/DL — HIGH (ref 70–99)
GLUCOSE BLDC GLUCOMTR-MCNC: 190 MG/DL — HIGH (ref 70–99)
GLUCOSE SERPL-MCNC: 162 MG/DL — HIGH (ref 70–99)
GLUCOSE SERPL-MCNC: 174 MG/DL — HIGH (ref 70–99)
GLUCOSE SERPL-MCNC: 246 MG/DL — HIGH (ref 70–99)
GRAM STN FLD: SIGNIFICANT CHANGE UP
HCT VFR BLD CALC: 30.1 % — LOW (ref 34.5–45)
HGB BLD-MCNC: 9.5 G/DL — LOW (ref 11.5–15.5)
INR BLD: 1.3 RATIO — HIGH (ref 0.88–1.16)
L MONOCYTOG DNA BLD POS QL NAA+NON-PROBE: SIGNIFICANT CHANGE UP
LEGIONELLA AG UR QL: NEGATIVE — SIGNIFICANT CHANGE UP
MAGNESIUM SERPL-MCNC: 2.3 MG/DL — SIGNIFICANT CHANGE UP (ref 1.6–2.6)
MAGNESIUM SERPL-MCNC: 2.6 MG/DL — SIGNIFICANT CHANGE UP (ref 1.6–2.6)
MAGNESIUM SERPL-MCNC: 3.4 MG/DL — HIGH (ref 1.6–2.6)
MCHC RBC-ENTMCNC: 25 PG — LOW (ref 27–34)
MCHC RBC-ENTMCNC: 31.6 GM/DL — LOW (ref 32–36)
MCV RBC AUTO: 79.2 FL — LOW (ref 80–100)
METHOD TYPE: SIGNIFICANT CHANGE UP
MRSA PCR RESULT.: SIGNIFICANT CHANGE UP
NRBC # BLD: 0 /100 WBCS — SIGNIFICANT CHANGE UP (ref 0–0)
PHOSPHATE SERPL-MCNC: 1.9 MG/DL — LOW (ref 2.5–4.5)
PHOSPHATE SERPL-MCNC: 2.1 MG/DL — LOW (ref 2.5–4.5)
PHOSPHATE SERPL-MCNC: 3.4 MG/DL — SIGNIFICANT CHANGE UP (ref 2.5–4.5)
PLATELET # BLD AUTO: 115 K/UL — LOW (ref 150–400)
POTASSIUM SERPL-MCNC: 4.9 MMOL/L — SIGNIFICANT CHANGE UP (ref 3.5–5.3)
POTASSIUM SERPL-MCNC: 5.1 MMOL/L — SIGNIFICANT CHANGE UP (ref 3.5–5.3)
POTASSIUM SERPL-MCNC: 5.3 MMOL/L — SIGNIFICANT CHANGE UP (ref 3.5–5.3)
POTASSIUM SERPL-SCNC: 4.9 MMOL/L — SIGNIFICANT CHANGE UP (ref 3.5–5.3)
POTASSIUM SERPL-SCNC: 5.1 MMOL/L — SIGNIFICANT CHANGE UP (ref 3.5–5.3)
POTASSIUM SERPL-SCNC: 5.3 MMOL/L — SIGNIFICANT CHANGE UP (ref 3.5–5.3)
PROT SERPL-MCNC: 5.9 G/DL — LOW (ref 6–8.3)
PROT SERPL-MCNC: 6.5 G/DL — SIGNIFICANT CHANGE UP (ref 6–8.3)
PROTHROM AB SERPL-ACNC: 15.4 SEC — HIGH (ref 10.6–13.6)
RAPID RVP RESULT: SIGNIFICANT CHANGE UP
RBC # BLD: 3.8 M/UL — SIGNIFICANT CHANGE UP (ref 3.8–5.2)
RBC # FLD: 15.9 % — HIGH (ref 10.3–14.5)
S AUREUS DNA NOSE QL NAA+PROBE: DETECTED
SARS-COV-2 IGG SERPL QL IA: NEGATIVE — SIGNIFICANT CHANGE UP
SARS-COV-2 IGM SERPL IA-ACNC: 0.01 INDEX — SIGNIFICANT CHANGE UP
SARS-COV-2 RNA SPEC QL NAA+PROBE: SIGNIFICANT CHANGE UP
SODIUM SERPL-SCNC: 117 MMOL/L — CRITICAL LOW (ref 135–145)
SODIUM SERPL-SCNC: 125 MMOL/L — LOW (ref 135–145)
SODIUM SERPL-SCNC: 130 MMOL/L — LOW (ref 135–145)
SPECIMEN SOURCE: SIGNIFICANT CHANGE UP
SPECIMEN SOURCE: SIGNIFICANT CHANGE UP
WBC # BLD: 11.74 K/UL — HIGH (ref 3.8–10.5)
WBC # FLD AUTO: 11.74 K/UL — HIGH (ref 3.8–10.5)

## 2020-10-08 PROCEDURE — 99291 CRITICAL CARE FIRST HOUR: CPT

## 2020-10-08 RX ORDER — CHLORHEXIDINE GLUCONATE 213 G/1000ML
15 SOLUTION TOPICAL EVERY 12 HOURS
Refills: 0 | Status: DISCONTINUED | OUTPATIENT
Start: 2020-10-08 | End: 2020-10-09

## 2020-10-08 RX ORDER — MEROPENEM 1 G/30ML
2000 INJECTION INTRAVENOUS EVERY 8 HOURS
Refills: 0 | Status: DISCONTINUED | OUTPATIENT
Start: 2020-10-08 | End: 2020-10-20

## 2020-10-08 RX ORDER — SODIUM CHLORIDE 9 MG/ML
500 INJECTION INTRAMUSCULAR; INTRAVENOUS; SUBCUTANEOUS
Refills: 0 | Status: DISCONTINUED | OUTPATIENT
Start: 2020-10-08 | End: 2020-10-08

## 2020-10-08 RX ORDER — DEXTROSE 50 % IN WATER 50 %
15 SYRINGE (ML) INTRAVENOUS ONCE
Refills: 0 | Status: DISCONTINUED | OUTPATIENT
Start: 2020-10-08 | End: 2020-10-23

## 2020-10-08 RX ORDER — HYDRALAZINE HCL 50 MG
5 TABLET ORAL ONCE
Refills: 0 | Status: COMPLETED | OUTPATIENT
Start: 2020-10-08 | End: 2020-10-08

## 2020-10-08 RX ORDER — DEXTROSE 50 % IN WATER 50 %
25 SYRINGE (ML) INTRAVENOUS ONCE
Refills: 0 | Status: DISCONTINUED | OUTPATIENT
Start: 2020-10-08 | End: 2020-10-23

## 2020-10-08 RX ORDER — DEXTROSE 50 % IN WATER 50 %
12.5 SYRINGE (ML) INTRAVENOUS ONCE
Refills: 0 | Status: DISCONTINUED | OUTPATIENT
Start: 2020-10-08 | End: 2020-10-23

## 2020-10-08 RX ORDER — CHOLECALCIFEROL (VITAMIN D3) 125 MCG
0 CAPSULE ORAL
Qty: 0 | Refills: 0 | DISCHARGE

## 2020-10-08 RX ORDER — GLUCAGON INJECTION, SOLUTION 0.5 MG/.1ML
1 INJECTION, SOLUTION SUBCUTANEOUS ONCE
Refills: 0 | Status: DISCONTINUED | OUTPATIENT
Start: 2020-10-08 | End: 2020-10-23

## 2020-10-08 RX ORDER — INSULIN LISPRO 100/ML
VIAL (ML) SUBCUTANEOUS EVERY 6 HOURS
Refills: 0 | Status: DISCONTINUED | OUTPATIENT
Start: 2020-10-08 | End: 2020-10-11

## 2020-10-08 RX ORDER — SODIUM CHLORIDE 9 MG/ML
500 INJECTION, SOLUTION INTRAVENOUS
Refills: 0 | Status: DISCONTINUED | OUTPATIENT
Start: 2020-10-08 | End: 2020-10-08

## 2020-10-08 RX ORDER — LABETALOL HCL 100 MG
5 TABLET ORAL ONCE
Refills: 0 | Status: COMPLETED | OUTPATIENT
Start: 2020-10-08 | End: 2020-10-08

## 2020-10-08 RX ORDER — LEVOTHYROXINE SODIUM 125 MCG
50 TABLET ORAL AT BEDTIME
Refills: 0 | Status: DISCONTINUED | OUTPATIENT
Start: 2020-10-08 | End: 2020-10-13

## 2020-10-08 RX ORDER — POTASSIUM PHOSPHATE, MONOBASIC POTASSIUM PHOSPHATE, DIBASIC 236; 224 MG/ML; MG/ML
15 INJECTION, SOLUTION INTRAVENOUS ONCE
Refills: 0 | Status: COMPLETED | OUTPATIENT
Start: 2020-10-08 | End: 2020-10-08

## 2020-10-08 RX ORDER — SODIUM CHLORIDE 9 MG/ML
1000 INJECTION, SOLUTION INTRAVENOUS
Refills: 0 | Status: DISCONTINUED | OUTPATIENT
Start: 2020-10-08 | End: 2020-10-23

## 2020-10-08 RX ORDER — PREDNISOLONE SODIUM PHOSPHATE 1 %
1 DROPS OPHTHALMIC (EYE)
Refills: 0 | Status: DISCONTINUED | OUTPATIENT
Start: 2020-10-08 | End: 2020-10-23

## 2020-10-08 RX ORDER — CYCLOSPORINE 0.5 MG/ML
1 EMULSION OPHTHALMIC
Refills: 0 | Status: DISCONTINUED | OUTPATIENT
Start: 2020-10-08 | End: 2020-10-23

## 2020-10-08 RX ORDER — SODIUM CHLORIDE 5 G/100ML
1000 INJECTION, SOLUTION INTRAVENOUS
Refills: 0 | Status: DISCONTINUED | OUTPATIENT
Start: 2020-10-08 | End: 2020-10-08

## 2020-10-08 RX ADMIN — Medication 1 DROP(S): at 05:16

## 2020-10-08 RX ADMIN — Medication 50 MICROGRAM(S): at 23:15

## 2020-10-08 RX ADMIN — Medication 100 MILLIGRAM(S): at 05:15

## 2020-10-08 RX ADMIN — Medication 5 MILLIGRAM(S): at 01:10

## 2020-10-08 RX ADMIN — Medication 1 DROP(S): at 05:15

## 2020-10-08 RX ADMIN — MEROPENEM 200 MILLIGRAM(S): 1 INJECTION INTRAVENOUS at 22:03

## 2020-10-08 RX ADMIN — Medication 1 DROP(S): at 18:19

## 2020-10-08 RX ADMIN — POTASSIUM PHOSPHATE, MONOBASIC POTASSIUM PHOSPHATE, DIBASIC 62.5 MILLIMOLE(S): 236; 224 INJECTION, SOLUTION INTRAVENOUS at 09:56

## 2020-10-08 RX ADMIN — Medication 1: at 18:22

## 2020-10-08 RX ADMIN — Medication 5 MILLIGRAM(S): at 23:05

## 2020-10-08 RX ADMIN — Medication 250 MILLIGRAM(S): at 05:16

## 2020-10-08 RX ADMIN — Medication 2: at 05:15

## 2020-10-08 RX ADMIN — Medication 1: at 23:16

## 2020-10-08 RX ADMIN — CYCLOSPORINE 1 DROP(S): 0.5 EMULSION OPHTHALMIC at 18:20

## 2020-10-08 RX ADMIN — CHLORHEXIDINE GLUCONATE 15 MILLILITER(S): 213 SOLUTION TOPICAL at 05:18

## 2020-10-08 RX ADMIN — CYCLOSPORINE 1 DROP(S): 0.5 EMULSION OPHTHALMIC at 05:15

## 2020-10-08 RX ADMIN — CHLORHEXIDINE GLUCONATE 15 MILLILITER(S): 213 SOLUTION TOPICAL at 18:23

## 2020-10-08 RX ADMIN — AZITHROMYCIN 500 MILLIGRAM(S): 500 TABLET, FILM COATED ORAL at 14:53

## 2020-10-08 NOTE — PROGRESS NOTE ADULT - SUBJECTIVE AND OBJECTIVE BOX
Follow-up Pulm Progress Note    Intubated in ED yesterday for hypoxic and hypercarbic respiratory failure  Sedated with Precedex  Sats 100% 30% FiO2    Medications:  MEDICATIONS  (STANDING):  artificial  tears Solution 1 Drop(s) Both EYES two times a day  azithromycin   Tablet 500 milliGRAM(s) Oral every 24 hours  cefTRIAXone   IVPB 1000 milliGRAM(s) IV Intermittent every 24 hours  chlorhexidine 0.12% Liquid 15 milliLiter(s) Oral Mucosa every 12 hours  cycloSPORINE (RESTASIS) 0.05% Emulsion 1 Drop(s) Both EYES two times a day  dexMEDEtomidine Infusion 0.2 MICROgram(s)/kG/Hr (2.08 mL/Hr) IV Continuous <Continuous>  dextrose 5%. 1000 milliLiter(s) (50 mL/Hr) IV Continuous <Continuous>  dextrose 50% Injectable 12.5 Gram(s) IV Push once  dextrose 50% Injectable 25 Gram(s) IV Push once  dextrose 50% Injectable 25 Gram(s) IV Push once  fentaNYL   Infusion... 0.5 MICROgram(s)/kG/Hr (1.04 mL/Hr) IV Continuous <Continuous>  hydrocortisone sodium succinate Injectable 100 milliGRAM(s) IV Push three times a day  insulin lispro (HumaLOG) corrective regimen sliding scale   SubCutaneous three times a day before meals  levothyroxine Injectable 50 MICROGram(s) IV Push at bedtime  magnesium sulfate  IVPB 2 Gram(s) IV Intermittent <User Schedule>  potassium phosphate IVPB 15 milliMole(s) IV Intermittent once  prednisoLONE acetate 1% Suspension 1 Drop(s) Both EYES two times a day  sodium chloride 2% . 1000 milliLiter(s) (30 mL/Hr) IV Continuous <Continuous>  vancomycin  IVPB 1000 milliGRAM(s) IV Intermittent every 12 hours    MEDICATIONS  (PRN):  acetaminophen    Suspension .. 650 milliGRAM(s) Oral every 4 hours PRN Temp greater or equal to 38C (100.4F), Mild Pain (1 - 3)  dextrose 40% Gel 15 Gram(s) Oral once PRN Blood Glucose LESS THAN 70 milliGRAM(s)/deciliter  glucagon  Injectable 1 milliGRAM(s) IntraMuscular once PRN Glucose LESS THAN 70 milligrams/deciliter      Mode: AC/ CMV (Assist Control/ Continuous Mandatory Ventilation)  RR (machine): 18  TV (machine): 400  FiO2: 30  PEEP: 5  ITime: 1  MAP: 9  PIP: 28  MV: 6.7      Vital Signs Last 24 Hrs  T(C): 36.7 (08 Oct 2020 08:00), Max: 37.3 (07 Oct 2020 12:10)  T(F): 98 (08 Oct 2020 08:00), Max: 99.2 (07 Oct 2020 12:10)  HR: 69 (08 Oct 2020 08:35) (60 - 109)  BP: 114/65 (08 Oct 2020 08:00) (89/52 - 215/103)  BP(mean): 83 (08 Oct 2020 08:00) (66 - 147)  RR: 29 (08 Oct 2020 08:00) (16 - 30)  SpO2: 100% (08 Oct 2020 08:35) (81% - 100%)    ABG - ( 08 Oct 2020 03:35 )  pH, Arterial: 7.42  pH, Blood: x     /  pCO2: 27    /  pO2: 331   / HCO3: 18    / Base Excess: -5.5  /  SaO2: 100           10-07 @ 07:01  -  10-08 @ 07:00  --------------------------------------------------------  IN: 693.9 mL / OUT: 1575 mL / NET: -881.1 mL      LABS:                        9.5    11.74 )-----------( 115      ( 08 Oct 2020 00:40 )             30.1     10-08    117<LL>  |  90<L>  |  8   ----------------------------<  246<H>  5.1   |  16<L>  |  <0.30<L>    Ca    8.4      08 Oct 2020 00:40  Phos  1.9     10-08  Mg     3.4     10-08    TPro  6.5  /  Alb  3.0<L>  /  TBili  1.0  /  DBili  x   /  AST  68<H>  /  ALT  71<H>  /  AlkPhos  48  10-08      CAPILLARY BLOOD GLUCOSE      POCT Blood Glucose.: 190 mg/dL (08 Oct 2020 05:09)    PT/INR - ( 08 Oct 2020 00:40 )   PT: 15.4 sec;   INR: 1.30 ratio         PTT - ( 08 Oct 2020 00:40 )  PTT:35.7 sec  Urinalysis Basic - ( 07 Oct 2020 12:40 )    Color: Yellow / Appearance: Slightly Turbid / S.022 / pH: x  Gluc: x / Ketone: Trace  / Bili: Negative / Urobili: Negative   Blood: x / Protein: 100 / Nitrite: Negative   Leuk Esterase: Large / RBC: 3 /hpf / WBC 30 /HPF   Sq Epi: x / Non Sq Epi: x / Bacteria: x      Physical Examination:  PULM: Coarse bilaterally   CVS: RRR    RADIOLOGY REVIEWED  CT chest: < from: CT Chest No Cont (10.07.20 @ 15:24) >  FINDINGS:    LUNGS AND AIRWAYS: Low lung volumes. Elevated diaphragm. Bilateral consolidations in a posterior and dependent distribution.  PLEURA: No pleural effusion.  MEDIASTINUM AND ROSEMARY: No lymphadenopathy.  VESSELS: Aortic calcifications.  HEART: Heart size is normal. No pericardial effusion. Coronary artery calcifications  CHEST WALL AND LOWER NECK: Within normal limits.  VISUALIZED UPPER ABDOMEN: Within normal limits.  BONES: Within normal limits.    IMPRESSION:  Bilateral posterior and dependent consolidations, likely representing atelectasis.      < end of copied text >

## 2020-10-08 NOTE — PROGRESS NOTE ADULT - ASSESSMENT
This is a 70/F w/ RA, DM, HTN, fibromyalgia, hypothyroidism, brain aneurysm s/p repair BIBEMS for AMS.  Found in respiratory distress likely 2/2 multifocal PNA.  Admitted to medicine. Intubated for hypoxemic respiratory failure, transferred to PICU.  r/o COVID-19.    #Neuro  - c/w medical sedation  - will call collateral for baseline MS    #Cardiovascular  - initially req Levophed but now hypertensive off of any pressor  - f/u EKG  - tachycardic in setting of infection  - pt actually hypertensive but will hold meds in setting of infection at this time    #Pulmonary  Acute Hypoxemic Respiratory Failure  - likely 2/2 multifocal PNA demonstrated on CT chest  - intubated, now on mechanical ventilation  - PRVC: 16/400, 5/100  - f/u post-intubation ABG    #FEN/GI  - NPO at this time  - f/u OGT placement  - will consider starting TF  - mild transaminitis, likely ISO infection; f/u legionella    #/Renal  Hyponatremia  - euvolemic on exam  - SOsm, UOsm, Opal, UCl  - TSH, AM cortisol    Hypokalemia  - replete PRN    #Endo  - c/w synthroid  - c/w stress dose steroid    #Heme  Leukocytosis  - possibly in setting of infection  - c/w abx    Normocytic anemia  - f/u iron studies, B12, folate, retic    #ID  multifocal PNA  - c/w Vanc, CTX, azithromycin  - f/u urine legionella  - f/u strep pneumo urine Ag  - obtain MRSA nose swab  - f/u COVID-19 PCR x2  - RVP    #Prophylaxis  - Lovenox    #Med Rec  - no admission med rec done    #Ethics  - FULL CODE This is a 70/F w/ RA, DM, HTN, fibromyalgia, hypothyroidism, brain aneurysm s/p repair BIBEMS for AMS.  Found in respiratory distress likely 2/2 multifocal PNA.  Admitted to medicine. Intubated for hypoxemic respiratory failure, transferred to PICU.  r/o COVID-19.    #Neuro  - c/w medical sedation  - will call collateral for baseline MS    #Cardiovascular  - initially req Levophed but now hypertensive off of any pressor  - f/u EKG  - tachycardic in setting of infection  - pt actually hypertensive but will hold meds in setting of infection at this time    #Pulmonary  Acute Hypoxemic Respiratory Failure  - likely 2/2 multifocal PNA demonstrated on CT chest  - intubated, now on mechanical ventilation  - PRVC: RR: 16, TV: 400, PEEP:5 FiO2: 30  - f/u post-intubation ABG    #FEN/GI  - NPO at this time  - f/u OGT placement  - will consider starting TF  - mild transaminitis, likely ISO infection; f/u legionella    #/Renal  Hyponatremia  - euvolemic on exam  - SOsm, UOsm, Opal, UCl  - TSH, AM cortisol    Hypokalemia  - replete PRN    #Endo  - c/w synthroid  - c/w stress dose steroid    #Heme  Leukocytosis  - possibly in setting of infection  - c/w abx    Normocytic anemia  - f/u iron studies, B12, folate, retic    #ID  multifocal PNA  - c/w Vanc, CTX, azithromycin  - f/u urine legionella  - f/u strep pneumo urine Ag  - obtain MRSA nose swab  - f/u COVID-19 PCR x2  - RVP    #Prophylaxis  - Lovenox    #Med Rec  - no admission med rec done    #Ethics  - FULL CODE This is a 70/F w/ RA, DM, HTN, fibromyalgia, hypothyroidism, brain aneurysm s/p repair BIBEMS for AMS.  Found in respiratory distress likely 2/2 multifocal PNA.  Admitted to medicine. Intubated for hypoxemic respiratory failure, transferred to PICU.  r/o COVID-19.    #Neuro  - holding precedex on spontaneous breathing trial    #Cardiovascular  - normotensive, off all pressors and home HTN meds  - f/u EKG  - tachycardic in setting of infection    #Pulmonary  Acute Hypoxemic Respiratory Failure  - likely 2/2 multifocal PNA demonstrated on CT chest  - intubated, now on mechanical ventilation, SBT today  - f/u ABG    #FEN/GI  - NPO at this time  - OGT in place  - will consider starting TF if pt continues to require intubation  - mild transaminitis, likely ISO infection; f/u legionella    #/Renal  Hyponatremia  - euvolemic on exam  - SOsm, UOsm, Opal, UCl consistent with SIADH  - Stop hypertonic saline, Na: 125   - q6 BMP    Hypokalemia  - replete PRN    #Endo  Hypothyroid  - c/w synthroid  DM2  - c/w FS q6hs ISS     #Rheum  -Resume home prednisone 5mg (stop stress dose)  -Hold etanercept in the setting of sepsis    #Heme  Leukocytosis  - possibly in setting of infection  - c/w abx    Normocytic anemia  - Stable  - f/u iron studies, B12, folate, retic    #ID  multifocal PNA  - c/w Vanc, CTX, azithromycin  - f/u urine legionella  - f/u strep pneumo urine Ag  - obtain MRSA nose swab  - COVID neg, RVP neg    #Prophylaxis  - Lovenox    #Med Rec  - no admission med rec done    #Ethics  - FULL CODE

## 2020-10-08 NOTE — PROGRESS NOTE ADULT - SUBJECTIVE AND OBJECTIVE BOX
CHIEF COMPLAINT: SOB    Interval Events:     REVIEW OF SYSTEMS:  Constitutional: [ ] negative [ ] fevers [ ] chills [ ] weight loss [ ] weight gain  HEENT: [ ] negative [ ] dry eyes [ ] eye irritation [ ] postnasal drip [ ] nasal congestion  CV: [ ] negative  [ ] chest pain [ ] orthopnea [ ] palpitations [ ] murmur  Resp: [ ] negative [ ] cough [ ] shortness of breath [ ] dyspnea [ ] wheezing [ ] sputum [ ] hemoptysis  GI: [ ] negative [ ] nausea [ ] vomiting [ ] diarrhea [ ] constipation [ ] abd pain [ ] dysphagia   : [ ] negative [ ] dysuria [ ] nocturia [ ] hematuria [ ] increased urinary frequency  Musculoskeletal: [ ] negative [ ] back pain [ ] myalgias [ ] arthralgias [ ] fracture  Skin: [ ] negative [ ] rash [ ] itch  Neurological: [ ] negative [ ] headache [ ] dizziness [ ] syncope [ ] weakness [ ] numbness  Psychiatric: [ ] negative [ ] anxiety [ ] depression  Endocrine: [ ] negative [ ] diabetes [ ] thyroid problem  Hematologic/Lymphatic: [ ] negative [ ] anemia [ ] bleeding problem  Allergic/Immunologic: [ ] negative [ ] itchy eyes [ ] nasal discharge [ ] hives [ ] angioedema  [ ] All other systems negative  [X ] Unable to assess ROS because sedated and intubated    OBJECTIVE:  ICU Vital Signs Last 24 Hrs  T(C): 36.7 (08 Oct 2020 04:00), Max: 37.3 (07 Oct 2020 12:10)  T(F): 98 (08 Oct 2020 04:00), Max: 99.2 (07 Oct 2020 12:10)  HR: 68 (08 Oct 2020 07:00) (60 - 109)  BP: 110/55 (08 Oct 2020 07:00) (89/52 - 215/103)  BP(mean): 79 (08 Oct 2020 07:00) (66 - 147)  ABP: --  ABP(mean): --  RR: 20 (08 Oct 2020 07:00) (16 - 30)  SpO2: 100% (08 Oct 2020 07:00) (81% - 100%)    Mode: AC/ CMV (Assist Control/ Continuous Mandatory Ventilation), RR (machine): 18, TV (machine): 400, FiO2: 30, PEEP: 5, ITime: 1, MAP: 10, PIP: 23    10-07 @ 07:01  -  10-08 @ 07:00  --------------------------------------------------------  IN: 693.9 mL / OUT: 1575 mL / NET: -881.1 mL      CAPILLARY BLOOD GLUCOSE      POCT Blood Glucose.: 190 mg/dL (08 Oct 2020 05:09)      PHYSICAL EXAM:  General: medically sedated, elder  HEENT: intubated  Neck: soft, no LAD  Chest/Lungs: bibasilar rales, moving air well on mech vent  Heart: tachycardia to mid 90s; regular rhythm; systolic murmur left parasternal; no R/G  Abdomen: soft, non-tender, normal BS  Extremities: warm, well perfused; no edema noted  Skin: warm  Neuro: medically sedated  Psych: not assessed      LINES:    HOSPITAL MEDICATIONS:  Standing Meds:  artificial  tears Solution 1 Drop(s) Both EYES two times a day  azithromycin   Tablet 500 milliGRAM(s) Oral every 24 hours  cefTRIAXone   IVPB 1000 milliGRAM(s) IV Intermittent every 24 hours  chlorhexidine 0.12% Liquid 15 milliLiter(s) Oral Mucosa every 12 hours  cycloSPORINE (RESTASIS) 0.05% Emulsion 1 Drop(s) Both EYES two times a day  dexMEDEtomidine Infusion 0.2 MICROgram(s)/kG/Hr IV Continuous <Continuous>  dextrose 5%. 1000 milliLiter(s) IV Continuous <Continuous>  dextrose 50% Injectable 12.5 Gram(s) IV Push once  dextrose 50% Injectable 25 Gram(s) IV Push once  dextrose 50% Injectable 25 Gram(s) IV Push once  fentaNYL   Infusion... 0.5 MICROgram(s)/kG/Hr IV Continuous <Continuous>  hydrocortisone sodium succinate Injectable 100 milliGRAM(s) IV Push three times a day  insulin lispro (HumaLOG) corrective regimen sliding scale   SubCutaneous three times a day before meals  levothyroxine Injectable 50 MICROGram(s) IV Push at bedtime  magnesium sulfate  IVPB 2 Gram(s) IV Intermittent <User Schedule>  prednisoLONE acetate 1% Suspension 1 Drop(s) Both EYES two times a day  sodium chloride 2% . 1000 milliLiter(s) IV Continuous <Continuous>  vancomycin  IVPB 1000 milliGRAM(s) IV Intermittent every 12 hours      PRN Meds:  acetaminophen    Suspension .. 650 milliGRAM(s) Oral every 4 hours PRN  dextrose 40% Gel 15 Gram(s) Oral once PRN  glucagon  Injectable 1 milliGRAM(s) IntraMuscular once PRN      LABS:                        9.5    11.74 )-----------( 115      ( 08 Oct 2020 00:40 )             30.1     Hgb Trend: 9.5<--, 9.4<--  1008    117<LL>  |  90<L>  |  8   ----------------------------<  246<H>  5.1   |  16<L>  |  <0.30<L>    Ca    8.4      08 Oct 2020 00:40  Phos  1.9     10-08  Mg     3.4     10-08    TPro  6.5  /  Alb  3.0<L>  /  TBili  1.0  /  DBili  x   /  AST  68<H>  /  ALT  71<H>  /  AlkPhos  48  10-08    Creatinine Trend: <0.30<--, <0.30<--, 0.37<--  PT/INR - ( 08 Oct 2020 00:40 )   PT: 15.4 sec;   INR: 1.30 ratio         PTT - ( 08 Oct 2020 00:40 )  PTT:35.7 sec  Urinalysis Basic - ( 07 Oct 2020 12:40 )    Color: Yellow / Appearance: Slightly Turbid / S.022 / pH: x  Gluc: x / Ketone: Trace  / Bili: Negative / Urobili: Negative   Blood: x / Protein: 100 / Nitrite: Negative   Leuk Esterase: Large / RBC: 3 /hpf / WBC 30 /HPF   Sq Epi: x / Non Sq Epi: x / Bacteria: x      Arterial Blood Gas:  10-08 @ 03:35  7.42/27/331/18/100/-5.5  ABG lactate: --  Arterial Blood Gas:  10-08 @ 00:35  7.42/27/187/17/100/-6.0  ABG lactate: --  Arterial Blood Gas:  10-07 @ 19:09  7.16/62/257/21/99/-7.3  ABG lactate: --  Arterial Blood Gas:  10-07 @ 17:29  7.34/34/335/18/100/-6.5  ABG lactate: --  Arterial Blood Gas:  10-07 @ 15:41  7.14/60/71/19/88/-9.0  ABG lactate: --    Venous Blood Gas:  10-07 @ 14:49  7.22/54/32/21/48  VBG Lactate: 2.8  Venous Blood Gas:  10-07 @ 12:39  7.25/54/31/23/47  VBG Lactate: 4.3      MICROBIOLOGY:     RADIOLOGY:  [ ] Reviewed and interpreted by me    EKG: CHIEF COMPLAINT: SOB    Interval Events: Pt transferred to MICU last night for     REVIEW OF SYSTEMS:  Constitutional: [ ] negative [ ] fevers [ ] chills [ ] weight loss [ ] weight gain  HEENT: [ ] negative [ ] dry eyes [ ] eye irritation [ ] postnasal drip [ ] nasal congestion  CV: [ ] negative  [ ] chest pain [ ] orthopnea [ ] palpitations [ ] murmur  Resp: [ ] negative [ ] cough [ ] shortness of breath [ ] dyspnea [ ] wheezing [ ] sputum [ ] hemoptysis  GI: [ ] negative [ ] nausea [ ] vomiting [ ] diarrhea [ ] constipation [ ] abd pain [ ] dysphagia   : [ ] negative [ ] dysuria [ ] nocturia [ ] hematuria [ ] increased urinary frequency  Musculoskeletal: [ ] negative [ ] back pain [ ] myalgias [ ] arthralgias [ ] fracture  Skin: [ ] negative [ ] rash [ ] itch  Neurological: [ ] negative [ ] headache [ ] dizziness [ ] syncope [ ] weakness [ ] numbness  Psychiatric: [ ] negative [ ] anxiety [ ] depression  Endocrine: [ ] negative [ ] diabetes [ ] thyroid problem  Hematologic/Lymphatic: [ ] negative [ ] anemia [ ] bleeding problem  Allergic/Immunologic: [ ] negative [ ] itchy eyes [ ] nasal discharge [ ] hives [ ] angioedema  [ ] All other systems negative  [X ] Unable to assess ROS because sedated and intubated    OBJECTIVE:  ICU Vital Signs Last 24 Hrs  T(C): 36.7 (08 Oct 2020 04:00), Max: 37.3 (07 Oct 2020 12:10)  T(F): 98 (08 Oct 2020 04:00), Max: 99.2 (07 Oct 2020 12:10)  HR: 68 (08 Oct 2020 07:00) (60 - 109)  BP: 110/55 (08 Oct 2020 07:00) (89/52 - 215/103)  BP(mean): 79 (08 Oct 2020 07:00) (66 - 147)  ABP: --  ABP(mean): --  RR: 20 (08 Oct 2020 07:00) (16 - 30)  SpO2: 100% (08 Oct 2020 07:00) (81% - 100%)    Mode: AC/ CMV (Assist Control/ Continuous Mandatory Ventilation), RR (machine): 18, TV (machine): 400, FiO2: 30, PEEP: 5, ITime: 1, MAP: 10, PIP: 23    10-07 @ 07:01  -  10-08 @ 07:00  --------------------------------------------------------  IN: 693.9 mL / OUT: 1575 mL / NET: -881.1 mL      CAPILLARY BLOOD GLUCOSE      POCT Blood Glucose.: 190 mg/dL (08 Oct 2020 05:09)      PHYSICAL EXAM:  General: medically sedated, small woman appears stated age  HEENT: intubated  Neck: soft, no LAD  Chest/Lungs: +coarse breath sounds, moving air well on mech vent, PRVC: RR: 16, TV: 400, PEEP:5 FiO2: 30  Heart: tachycardic; regular rhythm  Abdomen: soft, non-tender, normal BS  Extremities: cold distal extremities, no edema noted  Skin: cold  Neuro: medically sedated  Psych: not assessed    LINES: R dorsal forearm, L hand and L       HOSPITAL MEDICATIONS:  Standing Meds:  artificial  tears Solution 1 Drop(s) Both EYES two times a day  azithromycin   Tablet 500 milliGRAM(s) Oral every 24 hours  cefTRIAXone   IVPB 1000 milliGRAM(s) IV Intermittent every 24 hours  chlorhexidine 0.12% Liquid 15 milliLiter(s) Oral Mucosa every 12 hours  cycloSPORINE (RESTASIS) 0.05% Emulsion 1 Drop(s) Both EYES two times a day  dexMEDEtomidine Infusion 0.2 MICROgram(s)/kG/Hr IV Continuous <Continuous>  dextrose 5%. 1000 milliLiter(s) IV Continuous <Continuous>  dextrose 50% Injectable 12.5 Gram(s) IV Push once  dextrose 50% Injectable 25 Gram(s) IV Push once  dextrose 50% Injectable 25 Gram(s) IV Push once  fentaNYL   Infusion... 0.5 MICROgram(s)/kG/Hr IV Continuous <Continuous>  hydrocortisone sodium succinate Injectable 100 milliGRAM(s) IV Push three times a day  insulin lispro (HumaLOG) corrective regimen sliding scale   SubCutaneous three times a day before meals  levothyroxine Injectable 50 MICROGram(s) IV Push at bedtime  magnesium sulfate  IVPB 2 Gram(s) IV Intermittent <User Schedule>  prednisoLONE acetate 1% Suspension 1 Drop(s) Both EYES two times a day  sodium chloride 2% . 1000 milliLiter(s) IV Continuous <Continuous>  vancomycin  IVPB 1000 milliGRAM(s) IV Intermittent every 12 hours      PRN Meds:  acetaminophen    Suspension .. 650 milliGRAM(s) Oral every 4 hours PRN  dextrose 40% Gel 15 Gram(s) Oral once PRN  glucagon  Injectable 1 milliGRAM(s) IntraMuscular once PRN      LABS:                        9.5    11.74 )-----------( 115      ( 08 Oct 2020 00:40 )             30.1     Hgb Trend: 9.5<--, 9.4<--  1008    117<LL>  |  90<L>  |  8   ----------------------------<  246<H>  5.1   |  16<L>  |  <0.30<L>    Ca    8.4      08 Oct 2020 00:40  Phos  1.9     10-08  Mg     3.4     10-08    TPro  6.5  /  Alb  3.0<L>  /  TBili  1.0  /  DBili  x   /  AST  68<H>  /  ALT  71<H>  /  AlkPhos  48  10-08    Creatinine Trend: <0.30<--, <0.30<--, 0.37<--  PT/INR - ( 08 Oct 2020 00:40 )   PT: 15.4 sec;   INR: 1.30 ratio         PTT - ( 08 Oct 2020 00:40 )  PTT:35.7 sec  Urinalysis Basic - ( 07 Oct 2020 12:40 )    Color: Yellow / Appearance: Slightly Turbid / S.022 / pH: x  Gluc: x / Ketone: Trace  / Bili: Negative / Urobili: Negative   Blood: x / Protein: 100 / Nitrite: Negative   Leuk Esterase: Large / RBC: 3 /hpf / WBC 30 /HPF   Sq Epi: x / Non Sq Epi: x / Bacteria: x      Arterial Blood Gas:  10-08 @ 03:35  7.42/27/331/18/100/-5.5  ABG lactate: --  Arterial Blood Gas:  10-08 @ 00:35  7.42/27/187/17/100/-6.0  ABG lactate: --  Arterial Blood Gas:  10-07 @ 19:09  7.16/62/257/21/99/-7.3  ABG lactate: --  Arterial Blood Gas:  10-07 @ 17:29  7.34/34/335/18/100/-6.5  ABG lactate: --  Arterial Blood Gas:  10-07 @ 15:41  7.14/60/71/19/88/-9.0  ABG lactate: --    Venous Blood Gas:  10-07 @ 14:49  7.22/54/32/21/48  VBG Lactate: 2.8  Venous Blood Gas:  10-07 @ 12:39  7.25/54/31/23/47  VBG Lactate: 4.3      MICROBIOLOGY:     RADIOLOGY:  [ ] Reviewed and interpreted by me    EKG: CHIEF COMPLAINT: SOB    Interval Events: Pt intubated and transferred to MICU last night. Pt was hyponatremic to 117 at midnight, started on 6hrs of 2% NS. Pt seen and examined at bedside. Pt is responsive to painful stimuli.     REVIEW OF SYSTEMS:  Constitutional: [ ] negative [ ] fevers [ ] chills [ ] weight loss [ ] weight gain  HEENT: [ ] negative [ ] dry eyes [ ] eye irritation [ ] postnasal drip [ ] nasal congestion  CV: [ ] negative  [ ] chest pain [ ] orthopnea [ ] palpitations [ ] murmur  Resp: [ ] negative [ ] cough [ ] shortness of breath [ ] dyspnea [ ] wheezing [ ] sputum [ ] hemoptysis  GI: [ ] negative [ ] nausea [ ] vomiting [ ] diarrhea [ ] constipation [ ] abd pain [ ] dysphagia   : [ ] negative [ ] dysuria [ ] nocturia [ ] hematuria [ ] increased urinary frequency  Musculoskeletal: [ ] negative [ ] back pain [ ] myalgias [ ] arthralgias [ ] fracture  Skin: [ ] negative [ ] rash [ ] itch  Neurological: [ ] negative [ ] headache [ ] dizziness [ ] syncope [ ] weakness [ ] numbness  Psychiatric: [ ] negative [ ] anxiety [ ] depression  Endocrine: [ ] negative [ ] diabetes [ ] thyroid problem  Hematologic/Lymphatic: [ ] negative [ ] anemia [ ] bleeding problem  Allergic/Immunologic: [ ] negative [ ] itchy eyes [ ] nasal discharge [ ] hives [ ] angioedema  [ ] All other systems negative  [X] Unable to assess ROS because sedated and intubated    OBJECTIVE:  ICU Vital Signs Last 24 Hrs  T(C): 36.7 (08 Oct 2020 04:00), Max: 37.3 (07 Oct 2020 12:10)  T(F): 98 (08 Oct 2020 04:00), Max: 99.2 (07 Oct 2020 12:10)  HR: 68 (08 Oct 2020 07:00) (60 - 109)  BP: 110/55 (08 Oct 2020 07:00) (89/52 - 215/103)  BP(mean): 79 (08 Oct 2020 07:00) (66 - 147)  ABP: --  ABP(mean): --  RR: 20 (08 Oct 2020 07:00) (16 - 30)  SpO2: 100% (08 Oct 2020 07:00) (81% - 100%)    Mode: AC/ CMV (Assist Control/ Continuous Mandatory Ventilation), RR (machine): 18, TV (machine): 400, FiO2: 30, PEEP: 5, ITime: 1, MAP: 10, PIP: 23    10-07 @ 07:01  -  10-08 @ 07:00  --------------------------------------------------------  IN: 693.9 mL / OUT: 1575 mL / NET: -881.1 mL      CAPILLARY BLOOD GLUCOSE      POCT Blood Glucose.: 190 mg/dL (08 Oct 2020 05:09)      PHYSICAL EXAM:  General: medically sedated, small woman appears stated age  HEENT: intubated  Neck: soft, no LAD  Chest/Lungs: +coarse breath sounds, moving air well on mech vent, PRVC: RR: 16, TV: 400, PEEP:5 FiO2: 30  Heart: tachycardic; regular rhythm  Abdomen: soft, non-tender, normal BS  Extremities: cold distal extremities, no edema noted  Skin: cold  Neuro: medically sedated  Psych: not assessed    LINES: R dorsal forearm, L hand and L       HOSPITAL MEDICATIONS:  Standing Meds:  artificial  tears Solution 1 Drop(s) Both EYES two times a day  azithromycin   Tablet 500 milliGRAM(s) Oral every 24 hours  cefTRIAXone   IVPB 1000 milliGRAM(s) IV Intermittent every 24 hours  chlorhexidine 0.12% Liquid 15 milliLiter(s) Oral Mucosa every 12 hours  cycloSPORINE (RESTASIS) 0.05% Emulsion 1 Drop(s) Both EYES two times a day  dexMEDEtomidine Infusion 0.2 MICROgram(s)/kG/Hr IV Continuous <Continuous>  dextrose 5%. 1000 milliLiter(s) IV Continuous <Continuous>  dextrose 50% Injectable 12.5 Gram(s) IV Push once  dextrose 50% Injectable 25 Gram(s) IV Push once  dextrose 50% Injectable 25 Gram(s) IV Push once  fentaNYL   Infusion... 0.5 MICROgram(s)/kG/Hr IV Continuous <Continuous>  hydrocortisone sodium succinate Injectable 100 milliGRAM(s) IV Push three times a day  insulin lispro (HumaLOG) corrective regimen sliding scale   SubCutaneous three times a day before meals  levothyroxine Injectable 50 MICROGram(s) IV Push at bedtime  magnesium sulfate  IVPB 2 Gram(s) IV Intermittent <User Schedule>  prednisoLONE acetate 1% Suspension 1 Drop(s) Both EYES two times a day  sodium chloride 2% . 1000 milliLiter(s) IV Continuous <Continuous>  vancomycin  IVPB 1000 milliGRAM(s) IV Intermittent every 12 hours      PRN Meds:  acetaminophen    Suspension .. 650 milliGRAM(s) Oral every 4 hours PRN  dextrose 40% Gel 15 Gram(s) Oral once PRN  glucagon  Injectable 1 milliGRAM(s) IntraMuscular once PRN      LABS:                        9.5    11.74 )-----------( 115      ( 08 Oct 2020 00:40 )             30.1     Hgb Trend: 9.5<--, 9.4<--  1008    117<LL>  |  90<L>  |  8   ----------------------------<  246<H>  5.1   |  16<L>  |  <0.30<L>    Ca    8.4      08 Oct 2020 00:40  Phos  1.9     10  Mg     3.4     10-08    TPro  6.5  /  Alb  3.0<L>  /  TBili  1.0  /  DBili  x   /  AST  68<H>  /  ALT  71<H>  /  AlkPhos  48  10-08    Creatinine Trend: <0.30<--, <0.30<--, 0.37<--  PT/INR - ( 08 Oct 2020 00:40 )   PT: 15.4 sec;   INR: 1.30 ratio         PTT - ( 08 Oct 2020 00:40 )  PTT:35.7 sec  Urinalysis Basic - ( 07 Oct 2020 12:40 )    Color: Yellow / Appearance: Slightly Turbid / S.022 / pH: x  Gluc: x / Ketone: Trace  / Bili: Negative / Urobili: Negative   Blood: x / Protein: 100 / Nitrite: Negative   Leuk Esterase: Large / RBC: 3 /hpf / WBC 30 /HPF   Sq Epi: x / Non Sq Epi: x / Bacteria: x      Arterial Blood Gas:  10-08 @ 03:35  7.42/27/331/18/100/-5.5  ABG lactate: --  Arterial Blood Gas:  10-08 @ 00:35  7.42/27/187/17/100/-6.0  ABG lactate: --  Arterial Blood Gas:  10-07 @ 19:09  7.16/62/257/21/99/-7.3  ABG lactate: --  Arterial Blood Gas:  10-07 @ 17:29  7.34/34/335/18/100/-6.5  ABG lactate: --  Arterial Blood Gas:  10-07 @ 15:41  7.14/60/71/19/88/-9.0  ABG lactate: --    Venous Blood Gas:  10-07 @ 14:49  7.22/54/32/21/48  VBG Lactate: 2.8  Venous Blood Gas:  10-07 @ 12:39  7.25/54/31/23/47  VBG Lactate: 4.3      MICROBIOLOGY:     RADIOLOGY:  < from: CT Chest No Cont (10.07.20 @ 15:24) >  IMPRESSION:  Bilateral posterior and dependent consolidations, likely representing atelectasis.    < end of copied text >    < from: Xray Chest 1 View- PORTABLE-Urgent (Xray Chest 1 View- PORTABLE-Urgent .) (10.07.20 @ 21:16) >  IMPRESSION:    ET tube is unchanged. Interval placement of enteric tube terminating in the stomach.  Progression of left lower lung opacity may be combination of infection and atelectasis.  No pneumothorax.  Heart size borderline.  No acute bony defect.    < end of copied text >    < from: Xray Chest 1 View- PORTABLE-Urgent (10.07.20 @ 12:43) >  IMPRESSION:    Bilateral basilar opacities may represent pneumonia and or atelectasis. No pneumothorax.  Heart size cannot be accurately assessed on this AP projection.  No acute bony pathology.    < end of copied text >    [ ] Reviewed and interpreted by me    EKG:

## 2020-10-08 NOTE — CHART NOTE - NSCHARTNOTEFT_GEN_A_CORE
Spoke to patient's daughter Marysol to obtain collateral about pts baseline.    Marysol describes that pt is A&Ox4 at baseline, but has memory deficits, suspected to be associated with normal aging. Native languages are [place] and [place], sometimes pt is more responsive when spoken to in those languages, although she understands and speaks English very well. Pt's hearing is better R > L so try to speak on her right side. At baseline, pt is very weak and cannot sit up on her own. She requires full assistance to complete ADLs. 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 Pt has no hx of issues swallowing, but does require help to lift food to her mouth due to weakness. Daughter states "if you lift up her arm and drop it, it will fall." Pt has a hx of RA, OA, diabetic neuropathy, and multiple fractures of all extremities. Also has vertigo and balance issues. Ambulates with wheelchair at home. States pt had eye surgery 2 years ago and her eyes have a "glassy look" at baseline.     Pt has urinary incontinence and takes soliscain 5mg. Hx of perianal abscesses and hemorrhoids 2/2 chronic constipation. Daughter reports pt's history of anemia but does not due well on iron pills due to constipation.    Note: pt eats completely vegetarian diet, no eggs, no fish. Dairy is ok.

## 2020-10-08 NOTE — PROGRESS NOTE ADULT - SUBJECTIVE AND OBJECTIVE BOX
Patient is a 70y old  Female who presents with a chief complaint of SOB (09 Oct 2020 12:09)      SUBJECTIVE / OVERNIGHT EVENTS:  Seen in MICU  On vent  No need for pressors, BP is stable  Review of Systems:   Pt is intubated    MEDICATIONS  (STANDING):  artificial  tears Solution 1 Drop(s) Both EYES two times a day  cycloSPORINE (RESTASIS) 0.05% Emulsion 1 Drop(s) Both EYES two times a day  dextrose 5%. 1000 milliLiter(s) (50 mL/Hr) IV Continuous <Continuous>  dextrose 50% Injectable 12.5 Gram(s) IV Push once  dextrose 50% Injectable 25 Gram(s) IV Push once  dextrose 50% Injectable 25 Gram(s) IV Push once  insulin lispro (HumaLOG) corrective regimen sliding scale   SubCutaneous every 6 hours  levothyroxine Injectable 50 MICROGram(s) IV Push at bedtime  lisinopril 20 milliGRAM(s) Oral daily  meropenem  IVPB 2000 milliGRAM(s) IV Intermittent every 8 hours  methylPREDNISolone sodium succinate Injectable 8 milliGRAM(s) IV Push daily  potassium phosphate / sodium phosphate Powder (PHOS-NaK) 1 Packet(s) Oral two times a day before meals  prednisoLONE acetate 1% Suspension 1 Drop(s) Both EYES two times a day    MEDICATIONS  (PRN):  dextrose 40% Gel 15 Gram(s) Oral once PRN Blood Glucose LESS THAN 70 milliGRAM(s)/deciliter  glucagon  Injectable 1 milliGRAM(s) IntraMuscular once PRN Glucose LESS THAN 70 milligrams/deciliter  hydrALAZINE Injectable 5 milliGRAM(s) IV Push every 6 hours PRN SBP >170      PHYSICAL EXAM:  Vital Signs Last 24 Hrs  T(C): 36.6 (09 Oct 2020 20:00), Max: 37 (09 Oct 2020 04:00)  T(F): 97.9 (09 Oct 2020 20:00), Max: 98.6 (09 Oct 2020 04:00)  HR: 88 (09 Oct 2020 22:00) (69 - 124)  BP: 156/71 (09 Oct 2020 22:00) (116/58 - 210/99)  BP(mean): 102 (09 Oct 2020 22:00) (79 - 142)  RR: 16 (09 Oct 2020 22:00) (15 - 46)  SpO2: 98% (09 Oct 2020 22:00) (93% - 100%)  I&O's Summary    08 Oct 2020 07:01  -  09 Oct 2020 07:00  --------------------------------------------------------  IN: 491.4 mL / OUT: 640 mL / NET: -148.6 mL    09 Oct 2020 07:01  -  09 Oct 2020 22:55  --------------------------------------------------------  IN: 2670 mL / OUT: 300 mL / NET: 2370 mL      GENERAL:On a vent  HEAD:  Atraumatic, Normocephalic  EYES: EOMI, PERRLA, conjunctiva and sclera clear  NECK: Supple, No JVD  CHEST/LUNG: Clear to auscultation bilaterally; No wheeze  HEART: Regular rate and rhythm; No murmurs, rubs, or gallops  ABDOMEN: Soft, Nontender, Nondistended; Bowel sounds present  EXTREMITIES:  2+ Peripheral Pulses, No clubbing, cyanosis, or edema  PSYCH: AAOx3  NEUROLOGY: non-focal  SKIN: No rashes or lesions    LABS:  CAPILLARY BLOOD GLUCOSE      POCT Blood Glucose.: 239 mg/dL (09 Oct 2020 18:44)  POCT Blood Glucose.: 104 mg/dL (09 Oct 2020 05:11)  POCT Blood Glucose.: 151 mg/dL (08 Oct 2020 23:13)                          8.1    5.73  )-----------( 140      ( 09 Oct 2020 12:44 )             25.6     10-09    131<L>  |  101  |  10  ----------------------------<  96  3.6   |  16<L>  |  <0.30<L>    Ca    8.8      09 Oct 2020 12:44  Phos  2.1     10-09  Mg     1.6     10-09    TPro  6.0  /  Alb  2.9<L>  /  TBili  0.4  /  DBili  x   /  AST  38  /  ALT  57<H>  /  AlkPhos  41  10-09    PT/INR - ( 09 Oct 2020 00:21 )   PT: 13.6 sec;   INR: 1.14 ratio         PTT - ( 09 Oct 2020 00:21 )  PTT:30.2 sec          RADIOLOGY & ADDITIONAL TESTS:    Imaging Personally Reviewed:    Consultant(s) Notes Reviewed:      Care Discussed with Consultants/Other Providers:

## 2020-10-08 NOTE — PROGRESS NOTE ADULT - SUBJECTIVE AND OBJECTIVE BOX
CC: f/u for  listeria bacteremia  Patient reports nothing, on vent    REVIEW OF SYSTEMS:  All other review of systems cannot   Antimicrobials Day #  :1  meropenem  IVPB 2000 milliGRAM(s) IV Intermittent every 8 hours    Other Medications Reviewed    T(F): 98 (10-08-20 @ 08:00), Max: 98 (10-07-20 @ 16:30)  HR: 82 (10-08-20 @ 14:00)  BP: 132/67 (10-08-20 @ 14:00)  RR: 18 (10-08-20 @ 14:00)  SpO2: 100% (10-08-20 @ 14:00)  Wt(kg): --    PHYSICAL EXAM:  General: on vent , somewhat awake, no acute distress  Eyes:  anicteric, no conjunctival injection, no discharge  Oropharynx: no lesions or injection 	  Neck: supple, without adenopathy  Lungs: vent sounds to auscultation  Heart: regular rate and rhythm; no murmur, rubs or gallops  Abdomen: soft, nondistended, nontender, without mass or organomegaly  Skin: no lesions  Extremities: no clubbing, cyanosis, or edema  Neurologic: a bit responsive    LAB RESULTS:                        9.5    11.74 )-----------( 115      ( 08 Oct 2020 00:40 )             30.1     10-    130<L>  |  101  |  9   ----------------------------<  162<H>  5.3   |  15<L>  |  <0.30<L>    Ca    8.8      08 Oct 2020 14:54  Phos  3.4     10-  Mg     2.3     10-    TPro  5.9<L>  /  Alb  2.6<L>  /  TBili  0.5  /  DBili  x   /  AST  63<H>  /  ALT  63<H>  /  AlkPhos  43  10-08    LIVER FUNCTIONS - ( 08 Oct 2020 14:54 )  Alb: 2.6 g/dL / Pro: 5.9 g/dL / ALK PHOS: 43 U/L / ALT: 63 U/L / AST: 63 U/L / GGT: x           Urinalysis Basic - ( 07 Oct 2020 12:40 )    Color: Yellow / Appearance: Slightly Turbid / S.022 / pH: x  Gluc: x / Ketone: Trace  / Bili: Negative / Urobili: Negative   Blood: x / Protein: 100 / Nitrite: Negative   Leuk Esterase: Large / RBC: 3 /hpf / WBC 30 /HPF   Sq Epi: x / Non Sq Epi: x / Bacteria: x      MICROBIOLOGY:  RECENT CULTURES:  10-07 @ 15:07 .Blood Blood-Peripheral Blood Culture PCR    Growth in anaerobic bottle: Gram Positive Rods    Growth in anaerobic bottle: Gram Positive Rods        RADIOLOGY REVIEWED:  r< from: CT Chest No Cont (10.07.20 @ 15:24) >    EXAM:  CT CHEST                            PROCEDURE DATE:  10/07/2020            INTERPRETATION:  CLINICAL INFORMATION: Dyspnea. Rheumatoid arthritis. Immunosuppressed.    COMPARISON: Chest radiograph 10/7/2020 and CT chest 2005.    PROCEDURE:  CT of the Chest was performed without intravenous contrast.  Sagittal and coronal reformats were performed.    FINDINGS:    LUNGS AND AIRWAYS: Low lung volumes. Elevated diaphragm. Bilateral consolidations in a posterior and dependent distribution.  PLEURA: No pleural effusion.  MEDIASTINUM AND ROSEMARY: No lymphadenopathy.  VESSELS: Aortic calcifications.  HEART: Heart size is normal. No pericardial effusion. Coronary artery calcifications  CHEST WALL AND LOWER NECK: Within normal limits.  VISUALIZED UPPER ABDOMEN: Within normal limits.  BONES: Within normal limits.    IMPRESSION:  Bilateral posterior and dependent consolidations, likely representing atelectasis.              DEMETRA HIGUERA M.D., RADIOLOGY RESIDENT  This document has been electronically signed.  JOSÉ MIGUEL RUSSELL M.D., ATTENDING RADIOLOGIST  This document has been electronically signed. 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 This would not be a typical cause for pneumonia, must be concerned about meningitis, usual portal of entry is the gi tract. Can cause endocarditis. She is the typical  given steroids and biologic  Plan:  tailor to meropenem, high dose to cover brain  she needs an echo, repeat blood cultures  head ct, LP is a consideration but on high dose tx already, may not   favor ct abd and pelvis once stable  echo  repeat blood cultures

## 2020-10-08 NOTE — CHART NOTE - NSCHARTNOTEFT_GEN_A_CORE
Given concerns of hyponatremia, checked q6 metabolic panel for correction. Held hypertonic saline after 6 hours. Repeat metabolic panel in AM returned as 125.     Exam:   Patient was briefly off sedation during early afternoon and able to follow commands on exam, able to squeeze hands when asked,     Neuro exam (intubated but off precedex briefly, which was restarted)  - CN 2: able to nod to how many fingers provider was holding up  - CN3,5,6: able to grossly move eyes in all directions.  - CN7: has bit down on ET tube equally with no noted facial asymmetry  - CN8: Hearing preserved in R ear and reduced in L ear (confirmed by family)      Motor: limited given patient has baseline weakness and wheelchair bound  - able to follow commands and squeeze provider's hands when asked  - able to wiggle toes but has pain when pressing down at ankle level    Discussed with attending after 10/8/2020 9:43 AM   - Recommended to hold giving free water and recheck next BMP.     CMP at 2:54PM  - Recommended to start with tube feeds at 20ccs/ hr, assessing with nutrition Given concerns of hyponatremia, checked q6 metabolic panel for correction. Held hypertonic saline after 6 hours. Repeat metabolic panel in AM returned as 125.     Exam:   Patient was briefly off sedation during early afternoon and able to follow commands on exam, able to squeeze hands when asked,     Neuro exam (intubated but off precedex briefly, which was restarted after exam)  - CN 2: able to nod to how many fingers provider was holding up  - CN3,5,6: able to grossly move eyes in all directions.  - CN7: has bit down on ET tube equally with no noted facial asymmetry  - CN8: Hearing preserved in R ear and reduced in L ear (confirmed by family)      Motor: limited given patient has baseline weakness and wheelchair bound  - able to follow commands and squeeze provider's hands when asked  - able to wiggle toes but has pain when pressing down at ankle level    Discussed with attending after 10/8/2020 9:43 AM BMP results  - Recommended to hold giving free water and recheck next BMP.     Discussed with attending and fellow after 10/8/2020 2:54PM CMP results  - Recommended to start with tube feeds at 20-30ccs/ hr now until nutrition eval. Given concerns of hyponatremia, checked q6 metabolic panel for correction. Held hypertonic saline after 6 hours. Repeat metabolic panel in AM returned as 125 with concerns of overcorrection.     Exam:   Patient was briefly off sedation during early afternoon and able to follow commands on exam, able to squeeze hands when asked,     Neuro exam (intubated but off precedex briefly, which was restarted after exam)  - CN 2: able to nod to how many fingers provider was holding up  - CN3,5,6: able to grossly move eyes in all directions.  - CN7: has bit down on ET tube equally with no noted facial asymmetry  - CN8: Hearing preserved in R ear and reduced in L ear (confirmed by family)      Motor: limited given patient has baseline weakness and wheelchair bound  - able to follow commands and squeeze provider's hands when asked  - able to wiggle toes but has pain when pressing down at ankle level    Discussed with attending after 10/8/2020 9:43 AM BMP results  - Recommended to hold giving free water / D5W and recheck next metabolic panel.    Discussed with attending and fellow after 10/8/2020 2:54PM CMP results  - Recommended to start with tube feeds at 20-30ccs/ hr for now until nutrition eval. Given concerns of hyponatremia, checked q6 metabolic panel for correction. Held hypertonic saline after 6 hours. Repeat metabolic panel in AM returned as 125 with concerns of overcorrection.     Exam:   Patient was briefly off sedation during early afternoon and able to follow commands on exam, able to squeeze hands when asked,     Neuro exam (intubated but off precedex briefly, which was restarted after exam)  - CN 2: able to nod to how many fingers provider was holding up  - CN3,5,6: able to grossly move eyes in all directions.  - CN7: has bit down on ET tube equally with no noted facial asymmetry  - CN8: Hearing preserved in R ear and reduced in L ear (confirmed by family)      Motor: limited given patient has baseline weakness and wheelchair bound  - able to follow commands and squeeze provider's hands when asked  - able to wiggle toes but has pain when pressing down at ankle level    Discussed with attending after 10/8/2020 9:43 AM BMP results  - Recommended to hold giving free water / D5W and recheck next metabolic panel.    Discussed with attending and fellow after 10/8/2020 2:54PM CMP results  - Recommended to start with tube feeds at 20-30ccs/ hr for now until nutrition eval.    Update: given that tube feeds would take time, discussed with night team to slow overcorrection with 1/4NS for now and reassess after

## 2020-10-09 DIAGNOSIS — R78.81 BACTEREMIA: ICD-10-CM

## 2020-10-09 LAB
A1C WITH ESTIMATED AVERAGE GLUCOSE RESULT: 7.3 % — HIGH (ref 4–5.6)
ALBUMIN SERPL ELPH-MCNC: 2.9 G/DL — LOW (ref 3.3–5)
ALP SERPL-CCNC: 41 U/L — SIGNIFICANT CHANGE UP (ref 40–120)
ALT FLD-CCNC: 57 U/L — HIGH (ref 10–45)
ANION GAP SERPL CALC-SCNC: 12 MMOL/L — SIGNIFICANT CHANGE UP (ref 5–17)
ANION GAP SERPL CALC-SCNC: 14 MMOL/L — SIGNIFICANT CHANGE UP (ref 5–17)
APTT BLD: 30.2 SEC — SIGNIFICANT CHANGE UP (ref 27.5–35.5)
AST SERPL-CCNC: 38 U/L — SIGNIFICANT CHANGE UP (ref 10–40)
BILIRUB SERPL-MCNC: 0.4 MG/DL — SIGNIFICANT CHANGE UP (ref 0.2–1.2)
BLD GP AB SCN SERPL QL: NEGATIVE — SIGNIFICANT CHANGE UP
BUN SERPL-MCNC: 10 MG/DL — SIGNIFICANT CHANGE UP (ref 7–23)
BUN SERPL-MCNC: 11 MG/DL — SIGNIFICANT CHANGE UP (ref 7–23)
CALCIUM SERPL-MCNC: 8.8 MG/DL — SIGNIFICANT CHANGE UP (ref 8.4–10.5)
CALCIUM SERPL-MCNC: 9.1 MG/DL — SIGNIFICANT CHANGE UP (ref 8.4–10.5)
CHLORIDE SERPL-SCNC: 101 MMOL/L — SIGNIFICANT CHANGE UP (ref 96–108)
CHLORIDE SERPL-SCNC: 101 MMOL/L — SIGNIFICANT CHANGE UP (ref 96–108)
CO2 SERPL-SCNC: 16 MMOL/L — LOW (ref 22–31)
CO2 SERPL-SCNC: 16 MMOL/L — LOW (ref 22–31)
CREAT SERPL-MCNC: <0.3 MG/DL — LOW (ref 0.5–1.3)
CREAT SERPL-MCNC: <0.3 MG/DL — LOW (ref 0.5–1.3)
CULTURE RESULTS: SIGNIFICANT CHANGE UP
CULTURE RESULTS: SIGNIFICANT CHANGE UP
ESTIMATED AVERAGE GLUCOSE: 163 MG/DL — HIGH (ref 68–114)
GAS PNL BLDA: SIGNIFICANT CHANGE UP
GLUCOSE BLDC GLUCOMTR-MCNC: 104 MG/DL — HIGH (ref 70–99)
GLUCOSE BLDC GLUCOMTR-MCNC: 239 MG/DL — HIGH (ref 70–99)
GLUCOSE SERPL-MCNC: 148 MG/DL — HIGH (ref 70–99)
GLUCOSE SERPL-MCNC: 96 MG/DL — SIGNIFICANT CHANGE UP (ref 70–99)
HCT VFR BLD CALC: 25.6 % — LOW (ref 34.5–45)
HCT VFR BLD CALC: 25.8 % — LOW (ref 34.5–45)
HGB BLD-MCNC: 8.1 G/DL — LOW (ref 11.5–15.5)
HGB BLD-MCNC: 8.1 G/DL — LOW (ref 11.5–15.5)
INR BLD: 1.14 RATIO — SIGNIFICANT CHANGE UP (ref 0.88–1.16)
MAGNESIUM SERPL-MCNC: 1.6 MG/DL — SIGNIFICANT CHANGE UP (ref 1.6–2.6)
MAGNESIUM SERPL-MCNC: 2.1 MG/DL — SIGNIFICANT CHANGE UP (ref 1.6–2.6)
MCHC RBC-ENTMCNC: 24.6 PG — LOW (ref 27–34)
MCHC RBC-ENTMCNC: 25.2 PG — LOW (ref 27–34)
MCHC RBC-ENTMCNC: 31.4 GM/DL — LOW (ref 32–36)
MCHC RBC-ENTMCNC: 31.6 GM/DL — LOW (ref 32–36)
MCV RBC AUTO: 78.4 FL — LOW (ref 80–100)
MCV RBC AUTO: 79.5 FL — LOW (ref 80–100)
NRBC # BLD: 0 /100 WBCS — SIGNIFICANT CHANGE UP (ref 0–0)
NRBC # BLD: 0 /100 WBCS — SIGNIFICANT CHANGE UP (ref 0–0)
PHOSPHATE SERPL-MCNC: 2.1 MG/DL — LOW (ref 2.5–4.5)
PHOSPHATE SERPL-MCNC: 2.5 MG/DL — SIGNIFICANT CHANGE UP (ref 2.5–4.5)
PLATELET # BLD AUTO: 136 K/UL — LOW (ref 150–400)
PLATELET # BLD AUTO: 140 K/UL — LOW (ref 150–400)
POTASSIUM SERPL-MCNC: 3.6 MMOL/L — SIGNIFICANT CHANGE UP (ref 3.5–5.3)
POTASSIUM SERPL-MCNC: 4.4 MMOL/L — SIGNIFICANT CHANGE UP (ref 3.5–5.3)
POTASSIUM SERPL-SCNC: 3.6 MMOL/L — SIGNIFICANT CHANGE UP (ref 3.5–5.3)
POTASSIUM SERPL-SCNC: 4.4 MMOL/L — SIGNIFICANT CHANGE UP (ref 3.5–5.3)
PROT SERPL-MCNC: 6 G/DL — SIGNIFICANT CHANGE UP (ref 6–8.3)
PROTHROM AB SERPL-ACNC: 13.6 SEC — SIGNIFICANT CHANGE UP (ref 10.6–13.6)
RBC # BLD: 3.22 M/UL — LOW (ref 3.8–5.2)
RBC # BLD: 3.29 M/UL — LOW (ref 3.8–5.2)
RBC # FLD: 16.1 % — HIGH (ref 10.3–14.5)
RBC # FLD: 16.6 % — HIGH (ref 10.3–14.5)
RH IG SCN BLD-IMP: POSITIVE — SIGNIFICANT CHANGE UP
SODIUM SERPL-SCNC: 129 MMOL/L — LOW (ref 135–145)
SODIUM SERPL-SCNC: 131 MMOL/L — LOW (ref 135–145)
SPECIMEN SOURCE: SIGNIFICANT CHANGE UP
SPECIMEN SOURCE: SIGNIFICANT CHANGE UP
WBC # BLD: 5.73 K/UL — SIGNIFICANT CHANGE UP (ref 3.8–10.5)
WBC # BLD: 8.2 K/UL — SIGNIFICANT CHANGE UP (ref 3.8–10.5)
WBC # FLD AUTO: 5.73 K/UL — SIGNIFICANT CHANGE UP (ref 3.8–10.5)
WBC # FLD AUTO: 8.2 K/UL — SIGNIFICANT CHANGE UP (ref 3.8–10.5)

## 2020-10-09 PROCEDURE — 99291 CRITICAL CARE FIRST HOUR: CPT

## 2020-10-09 PROCEDURE — 93306 TTE W/DOPPLER COMPLETE: CPT | Mod: 26

## 2020-10-09 RX ORDER — HYDRALAZINE HCL 50 MG
5 TABLET ORAL EVERY 6 HOURS
Refills: 0 | Status: DISCONTINUED | OUTPATIENT
Start: 2020-10-09 | End: 2020-10-09

## 2020-10-09 RX ORDER — LABETALOL HCL 100 MG
10 TABLET ORAL EVERY 6 HOURS
Refills: 0 | Status: DISCONTINUED | OUTPATIENT
Start: 2020-10-09 | End: 2020-10-09

## 2020-10-09 RX ORDER — LABETALOL HCL 100 MG
10 TABLET ORAL ONCE
Refills: 0 | Status: COMPLETED | OUTPATIENT
Start: 2020-10-09 | End: 2020-10-09

## 2020-10-09 RX ORDER — LISINOPRIL 2.5 MG/1
20 TABLET ORAL DAILY
Refills: 0 | Status: DISCONTINUED | OUTPATIENT
Start: 2020-10-09 | End: 2020-10-15

## 2020-10-09 RX ORDER — PROPOFOL 10 MG/ML
15 INJECTION, EMULSION INTRAVENOUS
Qty: 500 | Refills: 0 | Status: DISCONTINUED | OUTPATIENT
Start: 2020-10-09 | End: 2020-10-09

## 2020-10-09 RX ORDER — ACETAMINOPHEN 500 MG
975 TABLET ORAL ONCE
Refills: 0 | Status: COMPLETED | OUTPATIENT
Start: 2020-10-09 | End: 2020-10-09

## 2020-10-09 RX ORDER — ACETAMINOPHEN 500 MG
650 TABLET ORAL ONCE
Refills: 0 | Status: COMPLETED | OUTPATIENT
Start: 2020-10-09 | End: 2020-10-09

## 2020-10-09 RX ORDER — HYDRALAZINE HCL 50 MG
5 TABLET ORAL ONCE
Refills: 0 | Status: COMPLETED | OUTPATIENT
Start: 2020-10-09 | End: 2020-10-09

## 2020-10-09 RX ORDER — SODIUM,POTASSIUM PHOSPHATES 278-250MG
1 POWDER IN PACKET (EA) ORAL
Refills: 0 | Status: COMPLETED | OUTPATIENT
Start: 2020-10-09 | End: 2020-10-10

## 2020-10-09 RX ORDER — HYDRALAZINE HCL 50 MG
5 TABLET ORAL EVERY 6 HOURS
Refills: 0 | Status: DISCONTINUED | OUTPATIENT
Start: 2020-10-09 | End: 2020-10-23

## 2020-10-09 RX ADMIN — Medication 8 MILLIGRAM(S): at 14:45

## 2020-10-09 RX ADMIN — Medication 650 MILLIGRAM(S): at 16:45

## 2020-10-09 RX ADMIN — Medication 10 MILLIGRAM(S): at 23:12

## 2020-10-09 RX ADMIN — CYCLOSPORINE 1 DROP(S): 0.5 EMULSION OPHTHALMIC at 18:47

## 2020-10-09 RX ADMIN — Medication 1 DROP(S): at 18:46

## 2020-10-09 RX ADMIN — Medication 1 DROP(S): at 05:12

## 2020-10-09 RX ADMIN — MEROPENEM 200 MILLIGRAM(S): 1 INJECTION INTRAVENOUS at 22:05

## 2020-10-09 RX ADMIN — Medication 1 DROP(S): at 18:47

## 2020-10-09 RX ADMIN — MEROPENEM 200 MILLIGRAM(S): 1 INJECTION INTRAVENOUS at 14:45

## 2020-10-09 RX ADMIN — Medication 1 PACKET(S): at 16:55

## 2020-10-09 RX ADMIN — CYCLOSPORINE 1 DROP(S): 0.5 EMULSION OPHTHALMIC at 05:12

## 2020-10-09 RX ADMIN — Medication 50 MICROGRAM(S): at 22:05

## 2020-10-09 RX ADMIN — Medication 5 MILLIGRAM(S): at 20:07

## 2020-10-09 RX ADMIN — MEROPENEM 200 MILLIGRAM(S): 1 INJECTION INTRAVENOUS at 06:45

## 2020-10-09 RX ADMIN — LISINOPRIL 20 MILLIGRAM(S): 2.5 TABLET ORAL at 14:45

## 2020-10-09 RX ADMIN — Medication 650 MILLIGRAM(S): at 17:51

## 2020-10-09 RX ADMIN — Medication 10 MILLIGRAM(S): at 15:18

## 2020-10-09 RX ADMIN — Medication 5 MILLIGRAM(S): at 01:18

## 2020-10-09 NOTE — DIETITIAN INITIAL EVALUATION ADULT. - PROBLEM SELECTOR PLAN 1
IV fluids given. ID eval called. She could have UTI as well. Will DERRICK teague. She appears to be critically ill with severe sepsis, with tachycardia, hypoxia

## 2020-10-09 NOTE — AIRWAY REMOVAL NOTE  ADULT & PEDS - ARTIFICAL AIRWAY REMOVAL COMMENTS
Written order for extubation verified. The patient was identified by full name and birth date compared to the identification band. Present during the procedure was RT Winston Trejo

## 2020-10-09 NOTE — DIETITIAN INITIAL EVALUATION ADULT. - ENERGY NEEDS
Ht: 58"   Wt: 91  BMI: 19.1 kg/m2   IBW: 96 (+/-10%)    within IBW  Edema:  1+ B/L arm       Skin: no pressure injuries documented

## 2020-10-09 NOTE — PROGRESS NOTE ADULT - SUBJECTIVE AND OBJECTIVE BOX
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 meropenem added 10/8    REVIEW OF SYSTEMS:  All other review of systems negative (Comprehensive ROS): headache and dyspnea    Antimicrobials Day #  :day 2  meropenem  IVPB 2000 milliGRAM(s) IV Intermittent every 8 hours      Other Medications Reviewed  MEDICATIONS  (STANDING):  artificial  tears Solution 1 Drop(s) Both EYES two times a day  cycloSPORINE (RESTASIS) 0.05% Emulsion 1 Drop(s) Both EYES two times a day  dextrose 5%. 1000 milliLiter(s) (50 mL/Hr) IV Continuous <Continuous>  dextrose 50% Injectable 12.5 Gram(s) IV Push once  dextrose 50% Injectable 25 Gram(s) IV Push once  dextrose 50% Injectable 25 Gram(s) IV Push once  insulin lispro (HumaLOG) corrective regimen sliding scale   SubCutaneous every 6 hours  levothyroxine Injectable 50 MICROGram(s) IV Push at bedtime  meropenem  IVPB 2000 milliGRAM(s) IV Intermittent every 8 hours  prednisoLONE acetate 1% Suspension 1 Drop(s) Both EYES two times a day    T(F): 98 (10-09-20 @ 07:00), Max: 99 (10-08-20 @ 16:00)  HR: 93 (10-09-20 @ 07:00)  BP: 152/75 (10-09-20 @ 07:00)  RR: 15 (10-09-20 @ 07:00)  SpO2: 100% (10-09-20 @ 07:00)  Wt(kg): --    PHYSICAL EXAM:  General: alert, no acute distress  Eyes:  anicteric, no conjunctival injection, no discharge  Oropharynx: no lesions or injection 	  Neck: supple, without adenopathy  Lungs: decreased at bases  Heart: regular rate and rhythm; no murmur, rubs or gallops  Abdomen: soft, nondistended, nontender, without mass or organomegaly  Skin: no lesions  Extremities: no clubbing, cyanosis, trace edema  Neurologic: alert, oriented, moves all extremities    LAB RESULTS:                        8.1    8.20  )-----------( 136      ( 09 Oct 2020 00:21 )             25.8     10    129<L>  |  101  |  11  ----------------------------<  148<H>  4.4   |  16<L>  |  <0.30<L>    Ca    9.1      09 Oct 2020 00:21  Phos  2.5     10-09  Mg     2.1     10-09    TPro  6.0  /  Alb  2.9<L>  /  TBili  0.4  /  DBili  x   /  AST  38  /  ALT  57<H>  /  AlkPhos  41  10-09    LIVER FUNCTIONS - ( 09 Oct 2020 00:21 )  Alb: 2.9 g/dL / Pro: 6.0 g/dL / ALK PHOS: 41 U/L / ALT: 57 U/L / AST: 38 U/L / GGT: x           Urinalysis Basic - ( 07 Oct 2020 12:40 )    Color: Yellow / Appearance: Slightly Turbid / S.022 / pH: x  Gluc: x / Ketone: Trace  / Bili: Negative / Urobili: Negative   Blood: x / Protein: 100 / Nitrite: Negative   Leuk Esterase: Large / RBC: 3 /hpf / WBC 30 /HPF   Sq Epi: x / Non Sq Epi: x / Bacteria: x      MICROBIOLOGY:  RECENT CULTURES:  10-08 @ 12:31 .Sputum Sputum trap       Rare polymorphonuclear leukocytes per low power field  No squamous epithelial cells per low power field  Rare Gram positive cocci in pairs per oil power field    10-07 @ 15:07 .Blood Blood-Peripheral Blood Culture PCR    Growth in anaerobic bottle: Gram Positive Rods  Growth in aerobic bottle: Gram Positive Rods    Growth in anaerobic bottle: Gram Positive Rods  Growth in aerobic bottle: Gram Positive Rods        RADIOLOGY REVIEWED:    < from: Xray Chest 1 View AP/PA (10.07.20 @ 16:21) >  IMPRESSION:    Bibasilar subsegmental atelectasis, unchanged. The lungs are otherwise clear.    < end of copied text >  < from: CT Chest No Cont (10.07.20 @ 15:24) >  IMPRESSION:  Bilateral posterior and dependent consolidations, likely representing atelectasis.    < end of copied text >

## 2020-10-09 NOTE — DIETITIAN INITIAL EVALUATION ADULT. - PERTINENT MEDS FT
MEDICATIONS  (STANDING):  artificial  tears Solution 1 Drop(s) Both EYES two times a day  cycloSPORINE (RESTASIS) 0.05% Emulsion 1 Drop(s) Both EYES two times a day  dextrose 5%. 1000 milliLiter(s) (50 mL/Hr) IV Continuous <Continuous>  dextrose 50% Injectable 12.5 Gram(s) IV Push once  dextrose 50% Injectable 25 Gram(s) IV Push once  dextrose 50% Injectable 25 Gram(s) IV Push once  insulin lispro (HumaLOG) corrective regimen sliding scale   SubCutaneous every 6 hours  levothyroxine Injectable 50 MICROGram(s) IV Push at bedtime  meropenem  IVPB 2000 milliGRAM(s) IV Intermittent every 8 hours  methylPREDNISolone sodium succinate Injectable 8 milliGRAM(s) IV Push daily  prednisoLONE acetate 1% Suspension 1 Drop(s) Both EYES two times a day

## 2020-10-09 NOTE — PROGRESS NOTE ADULT - SUBJECTIVE AND OBJECTIVE BOX
Follow-up Pulm Progress Note    Extubated this AM / A&O x 1-2  Sats 97% on RA    Medications:  MEDICATIONS  (STANDING):  artificial  tears Solution 1 Drop(s) Both EYES two times a day  cycloSPORINE (RESTASIS) 0.05% Emulsion 1 Drop(s) Both EYES two times a day  dextrose 5%. 1000 milliLiter(s) (50 mL/Hr) IV Continuous <Continuous>  dextrose 50% Injectable 12.5 Gram(s) IV Push once  dextrose 50% Injectable 25 Gram(s) IV Push once  dextrose 50% Injectable 25 Gram(s) IV Push once  insulin lispro (HumaLOG) corrective regimen sliding scale   SubCutaneous every 6 hours  levothyroxine Injectable 50 MICROGram(s) IV Push at bedtime  lisinopril 20 milliGRAM(s) Oral daily  meropenem  IVPB 2000 milliGRAM(s) IV Intermittent every 8 hours  methylPREDNISolone sodium succinate Injectable 8 milliGRAM(s) IV Push daily  prednisoLONE acetate 1% Suspension 1 Drop(s) Both EYES two times a day    MEDICATIONS  (PRN):  acetaminophen    Suspension .. 650 milliGRAM(s) Oral every 4 hours PRN Temp greater or equal to 38C (100.4F), Mild Pain (1 - 3)  dextrose 40% Gel 15 Gram(s) Oral once PRN Blood Glucose LESS THAN 70 milliGRAM(s)/deciliter  glucagon  Injectable 1 milliGRAM(s) IntraMuscular once PRN Glucose LESS THAN 70 milligrams/deciliter        Vital Signs Last 24 Hrs  T(C): 36.7 (09 Oct 2020 07:00), Max: 37.2 (08 Oct 2020 16:00)  T(F): 98 (09 Oct 2020 07:00), Max: 99 (08 Oct 2020 16:00)  HR: 112 (09 Oct 2020 11:00) (64 - 112)  BP: 171/81 (09 Oct 2020 11:00) (90/55 - 173/98)  BP(mean): 116 (09 Oct 2020 11:00) (67 - 128)  RR: 20 (09 Oct 2020 11:00) (14 - 31)  SpO2: 99% (09 Oct 2020 11:00) (99% - 100%)    ABG - ( 09 Oct 2020 00:20 )  pH, Arterial: 7.45  pH, Blood: x     /  pCO2: 26    /  pO2: 178   / HCO3: 18    / Base Excess: -5.2  /  SaO2: 100               VBG pH 7.22 10-07 @ 14:49    VBG pCO2 54 10-07 @ 14:49    VBG O2 sat 48 10-07 @ 14:49    VBG lactate 2.8 10-07 @ 14:49  VBG pH 7.25 10-07 @ 12:39    VBG pCO2 54 10-07 @ 12:39    VBG O2 sat 47 10-07 @ 12:39    VBG lactate 4.3 10-07 @ 12:39      10-08 @ 07:01  -  10-09 @ 07:00  --------------------------------------------------------  IN: 491.4 mL / OUT: 640 mL / NET: -148.6 mL          LABS:                        8.1    8.20  )-----------( 136      ( 09 Oct 2020 00:21 )             25.8     10    129<L>  |  101  |  11  ----------------------------<  148<H>  4.4   |  16<L>  |  <0.30<L>    Ca    9.1      09 Oct 2020 00:21  Phos  2.5     10-09  Mg     2.1     10-09    TPro  6.0  /  Alb  2.9<L>  /  TBili  0.4  /  DBili  x   /  AST  38  /  ALT  57<H>  /  AlkPhos  41  10-09        CAPILLARY BLOOD GLUCOSE      POCT Blood Glucose.: 104 mg/dL (09 Oct 2020 05:11)    PT/INR - ( 09 Oct 2020 00:21 )   PT: 13.6 sec;   INR: 1.14 ratio         PTT - ( 09 Oct 2020 00:21 )  PTT:30.2 sec  Urinalysis Basic - ( 07 Oct 2020 12:40 )    Color: Yellow / Appearance: Slightly Turbid / S.022 / pH: x  Gluc: x / Ketone: Trace  / Bili: Negative / Urobili: Negative   Blood: x / Protein: 100 / Nitrite: Negative   Leuk Esterase: Large / RBC: 3 /hpf / WBC 30 /HPF   Sq Epi: x / Non Sq Epi: x / Bacteria: x        CULTURES:     Culture - Blood (collected 10-07-20 @ 15:07)  Source: .Blood Blood-Peripheral  Gram Stain (10-08-20 @ 13:24):    Growth in anaerobic bottle: Gram Positive Rods    Growth in aerobic bottle: Gram Positive Rods  Preliminary Report (10-09-20 @ 11:22):    Listeria monocytogenes    Resistance to ampicillin or penicillin for Listeria    monocytogenes has not been described. L. monocytogenes is intrinsically    resistant to cephalosporins.    "Due to technical problems, Proteus sp. will Not be reported as partof    the BCID panel until further notice"    ***Blood Panel PCR results on this specimen are available    approximately 3 hours after the Gram stain result.***    Gram stain, PCR, and/or culture results may not always    correspond due to difference in methodologies.    ************************************************************    This PCR assay was performed using [employer].    The following targets are tested for: Enterococcus,    vancomycin resistant enterococci, Listeria monocytogenes,    coagulase negative staphylococci, S. aureus,    methicillin resistant S. aureus, Streptococcus agalactiae    (Group B), S. pneumoniae, S. pyogenes (Group A),    Acinetobacter baumannii, Enterobacter cloacae, E. coli,    Klebsiella oxytoca, K. pneumoniae, Proteus sp.,    Serratia marcescens, Haemophilus influenzae,    Neisseria meningitidis, Pseudomonas aeruginosa, Candida    albicans, C. glabrata, C krusei, C parapsilosis,    C. tropicalis and the KPC resistance gene.  Organism: Blood Culture PCR (10-08-20 @ 15:08)  Organism: Blood Culture PCR (10-08-20 @ 15:08)      -  Listeria monocytogenes: Detec      Method Type: PCR    Culture - Blood (collected 10-07-20 @ 15:07)  Source: .Blood Blood-Peripheral  Gram Stain (10-08-20 @ 21:16):    Growth in anaerobic bottle: Gram Positive Rods    Growth in aerobic bottle: Gram Positive Rods  Final Report (10-09-20 @ 11:03):    Growth in aerobic and anaerobic bottles: Listeria monocytogenes    Resistance to ampicillin or penicillin for Listeria    monocytogenes has not been described. L. monocytogenes is intrinsically    resistant to cephalosporins.        Culture - Urine (collected 10-07-20 @ 15:26)  Source: .Urine Clean Catch (Midstream)  Final Report (10-09-20 @ 08:38):    >=3 organisms. Probable collection contamination.        Physical Examination:  PULM: Clear to auscultation bilaterally, no significant sputum production  CVS: RRR    RADIOLOGY REVIEWED  CT chest: < from: CT Chest No Cont (10.07.20 @ 15:24) >  FINDINGS:    LUNGS AND AIRWAYS: Low lung volumes. Elevated diaphragm. Bilateral consolidations in a posterior and dependent distribution.  PLEURA: No pleural effusion.  MEDIASTINUM AND ROSEMARY: No lymphadenopathy.  VESSELS: Aortic calcifications.  HEART: Heart size is normal. No pericardial effusion. Coronary artery calcifications  CHEST WALL AND LOWER NECK: Within normal limits.  VISUALIZED UPPER ABDOMEN: Within normal limits.  BONES: Within normal limits.    IMPRESSION:  Bilateral posterior and dependent consolidations, likely representing atelectasis.    < end of copied text >

## 2020-10-09 NOTE — DIETITIAN INITIAL EVALUATION ADULT. - FACTORS AFF FOOD INTAKE
No order. Extubated this am. Extubated this am, awaiting diet advancement. Extubated this am, diet just advanced.

## 2020-10-09 NOTE — PROGRESS NOTE ADULT - ASSESSMENT
69 y/o F with PMH of RA, DM, HTN, fibromyalgia, hypothyroidism, brain aneurysm. Presents to ED with SOB and fever. Reports 3 days of fever, Tmax 102.5 this morning, no known COVID exposures. Found to be hypoxic by EMS and placed on 100% NRB. CXR with LLL opacity. Seen in ED - as per nurse pt went to CT chest and came back with increased WOB and altered mental status. S/p intubation in ED / extubated 10/9.

## 2020-10-09 NOTE — PROGRESS NOTE ADULT - ASSESSMENT
This is a 70/F w/ RA, DM, HTN, fibromyalgia, hypothyroidism, brain aneurysm s/p repair BIBEMS for AMS.  Found in respiratory distress likely 2/2 multifocal PNA.  Admitted to medicine. Intubated for hypoxemic respiratory failure, transferred to PICU.     #Neuro  - Off pressors     #Cardiovascular  - pt received 2x hydralazine overnight    #Pulmonary  Acute Hypoxemic Respiratory Failure  - likely 2/2 multifocal PNA demonstrated on CT chest  - extubated this am  - f/u ABG    #FEN/GI  - NPO at this time  - OGT in place  - mild transaminitis, likely ISO infection; urine legionella negative    #/Renal  Hyponatremia  - euvolemic on exam, Na corrected to 129   - q6 BMP    Hypokalemia  - replete PRN    #Endo  Hypothyroid  - c/w synthroid  DM2  - c/w FS q6hs ISS     #Rheum  -Resume home prednisone 5mg (stop stress dose)  -Hold etanercept in the setting of sepsis    #Heme  Leukocytosis  - possibly in setting of infection  - c/w abx    Normocytic anemia  - Stable  - f/u iron studies, B12, folate, retic    #ID  multifocal PNA  Blood cultures grew listeria, sputum cultures grew gram + cocci in pairs  - c/w meropenem  - urine legionella negative  - COVID neg, RVP neg    #Prophylaxis  - Lovenox    #Med Rec  - done    #Ethics  - FULL CODE This is a 70/F w/ RA, DM, HTN, fibromyalgia, hypothyroidism, brain aneurysm s/p repair BIBEMS for AMS.  Found in respiratory distress likely 2/2 multifocal PNA.  Admitted to medicine. Intubated for hypoxemic respiratory failure, transferred to PICU.     #Neuro  - Off pressors     #Cardiovascular  - pt received 2x hydralazine overnight  - persistently hypertensive, restart home ramipril     #Pulmonary  Acute Hypoxemic Respiratory Failure  - likely 2/2 atelectasis demonstrated on CT chest  - extubated this am, breathing comfortably  - f/u ABG    #FEN/GI  - NPO at this time  - OGT in place  - transaminitis resolving    #/Renal  Hyponatremia  - euvolemic on exam, Na corrected to 129   - q6 BMP    Hypokalemia  - replete PRN    #Endo  Hypothyroid  - c/w synthroid  DM2  - c/w FS q6hs ISS     #Rheum  -Resume home prednisone 5mg BID  -Hold etanercept in the setting of sepsis    #Heme  Leukocytosis resolved    Normocytic anemia  - Stable  - f/u iron studies, B12, folate, retic  - pt can't take iron due to constipation    #ID  Blood cultures grew listeria, concern for meningitis   - c/w meropenem  - determine extent of penicillin allergy  sputum cultures grew gram + cocci in pairs, FU for organism  - urine legionella negative  - COVID neg, RVP neg    #Prophylaxis  - Lovenox    #Med Rec  - done    #Ethics  - FULL CODE This is a 70/F w/ RA, DM, HTN, fibromyalgia, hypothyroidism, brain aneurysm s/p repair BIBEMS for AMS.  Found in respiratory distress likely 2/2 multifocal PNA.  Admitted to medicine. Intubated for hypoxemic respiratory failure, transferred to PICU.     #Neuro  - Off pressors  - mental status improving, able to secure own airway    #Cardiovascular  - pt received 2x hydralazine overnight  - persistently hypertensive, restart home ramipril     #Pulmonary  Acute Hypoxemic Respiratory Failure  - likely 2/2 atelectasis demonstrated on CT chest  - extubated this am, breathing on RA    #FEN/GI  - NPO at this time  - transaminitis resolving    #/Renal  Hyponatremia  - euvolemic on exam, Na corrected to 129   - q6 BMP    Hypokalemia  - replete PRN    #Endo  Hypothyroid  - c/w synthroid  DM2  - c/w FS q6hs ISS     #Rheum  -Resume home prednisone 5mg BID- equivalent IV dosing is solumedrol 8mg QD  -Hold etanercept in the setting of sepsis    #Heme  Leukocytosis resolved    Normocytic anemia  - Stable  - f/u iron studies, B12, folate, retic  - pt can't take iron due to constipation    #ID  Blood cultures grew listeria, concern for meningitis   - c/w meropenem, pt gets hives from penicillin  - FU repeat cultures  - urine legionella negative  - COVID neg, RVP neg    #Prophylaxis  - Lovenox    #Med Rec  - done    #Ethics  - FULL CODE

## 2020-10-09 NOTE — DIETITIAN INITIAL EVALUATION ADULT. - OTHER INFO
Pt seen for: consult   Adm dx: sepsis, hyponatremia    GI issues: denies N/V  Last BM: 10/7    Food allergies/Intolerances:  walnuts, pineapple  Vit/supplement PTA: vit D    Diet PTA: tries to limit CHO intake  eats 3 meals/day and 1 snack, is lacto-vegetarian     Subjective/Objective information: pt reports fair appetite PTA (wasn't feeling well). States usual wt 91 lb (weighed 100 lb > 1 year ago). Checks BG once a day can't recall range, states last A1c was 6 months ago before COVID, doesn't recall level. Pt reports ht as 4'10", ht on adm doc as 65"(pt is not 5'5" tall)    Education: declined at this time

## 2020-10-09 NOTE — PROGRESS NOTE ADULT - SUBJECTIVE AND OBJECTIVE BOX
CHIEF COMPLAINT: SOB    Interval Events: Pt was extubated earlier this morning.     REVIEW OF SYSTEMS:  Constitutional: [ ] negative [ ] fevers [ ] chills [ ] weight loss [ ] weight gain  HEENT: [ ] negative [ ] dry eyes [ ] eye irritation [ ] postnasal drip [ ] nasal congestion  CV: [ ] negative  [ ] chest pain [ ] orthopnea [ ] palpitations [ ] murmur  Resp: [ ] negative [ ] cough [ ] shortness of breath [ ] dyspnea [ ] wheezing [ ] sputum [ ] hemoptysis  GI: [ ] negative [ ] nausea [ ] vomiting [ ] diarrhea [ ] constipation [ ] abd pain [ ] dysphagia   : [ ] negative [ ] dysuria [ ] nocturia [ ] hematuria [ ] increased urinary frequency  Musculoskeletal: [ ] negative [ ] back pain [ ] myalgias [ ] arthralgias [ ] fracture  Skin: [ ] negative [ ] rash [ ] itch  Neurological: [ ] negative [ ] headache [ ] dizziness [ ] syncope [ ] weakness [ ] numbness  Psychiatric: [ ] negative [ ] anxiety [ ] depression  Endocrine: [ ] negative [ ] diabetes [ ] thyroid problem  Hematologic/Lymphatic: [ ] negative [ ] anemia [ ] bleeding problem  Allergic/Immunologic: [ ] negative [ ] itchy eyes [ ] nasal discharge [ ] hives [ ] angioedema  [ ] All other systems negative  [ ] Unable to assess ROS because ________    OBJECTIVE:  ICU Vital Signs Last 24 Hrs  T(C): 37 (09 Oct 2020 04:00), Max: 37.2 (08 Oct 2020 16:00)  T(F): 98.6 (09 Oct 2020 04:00), Max: 99 (08 Oct 2020 16:00)  HR: 93 (09 Oct 2020 07:00) (64 - 97)  BP: 152/75 (09 Oct 2020 07:00) (90/55 - 173/98)  BP(mean): 108 (09 Oct 2020 07:00) (67 - 128)  ABP: --  ABP(mean): --  RR: 15 (09 Oct 2020 07:00) (14 - 31)  SpO2: 100% (09 Oct 2020 07:00) (99% - 100%)    Mode: CPAP with PS, FiO2: 21, PEEP: 5, PS: 5, MAP: 7, PIP: 10    10- @ 07:01  -  10-09 @ 07:00  --------------------------------------------------------  IN: 470.2 mL / OUT: 640 mL / NET: -169.8 mL      CAPILLARY BLOOD GLUCOSE      POCT Blood Glucose.: 104 mg/dL (09 Oct 2020 05:11)    PHYSICAL EXAM:  General: medically sedated, small woman appears stated age  HEENT: intubated  Neck: soft, no LAD  Chest/Lungs: +coarse breath sounds, moving air well   Heart: tachycardic; regular rhythm  Abdomen: soft, non-tender, normal BS  Extremities: cold distal extremities, no edema noted  Skin: cold  Neuro: medically sedated  Psych: not assessed    LINES: R dorsal forearm, L hand and L AC    HOSPITAL MEDICATIONS:  Standing Meds:  artificial  tears Solution 1 Drop(s) Both EYES two times a day  cycloSPORINE (RESTASIS) 0.05% Emulsion 1 Drop(s) Both EYES two times a day  dextrose 5%. 1000 milliLiter(s) IV Continuous <Continuous>  dextrose 50% Injectable 12.5 Gram(s) IV Push once  dextrose 50% Injectable 25 Gram(s) IV Push once  dextrose 50% Injectable 25 Gram(s) IV Push once  insulin lispro (HumaLOG) corrective regimen sliding scale   SubCutaneous every 6 hours  levothyroxine Injectable 50 MICROGram(s) IV Push at bedtime  meropenem  IVPB 2000 milliGRAM(s) IV Intermittent every 8 hours  prednisoLONE acetate 1% Suspension 1 Drop(s) Both EYES two times a day      PRN Meds:  acetaminophen    Suspension .. 650 milliGRAM(s) Oral every 4 hours PRN  dextrose 40% Gel 15 Gram(s) Oral once PRN  glucagon  Injectable 1 milliGRAM(s) IntraMuscular once PRN      LABS:                        8.1    8.20  )-----------( 136      ( 09 Oct 2020 00:21 )             25.8     Hgb Trend: 8.1<--, 9.5<--, 9.4<--  1009    129<L>  |  101  |  11  ----------------------------<  148<H>  4.4   |  16<L>  |  <0.30<L>    Ca    9.1      09 Oct 2020 00:21  Phos  2.5     10-09  Mg     2.1     10-09    TPro  6.0  /  Alb  2.9<L>  /  TBili  0.4  /  DBili  x   /  AST  38  /  ALT  57<H>  /  AlkPhos  41  10-09    Creatinine Trend: <0.30<--, <0.30<--, <0.30<--, <0.30<--, <0.30<--, 0.37<--  PT/INR - ( 09 Oct 2020 00:21 )   PT: 13.6 sec;   INR: 1.14 ratio         PTT - ( 09 Oct 2020 00:21 )  PTT:30.2 sec  Urinalysis Basic - ( 07 Oct 2020 12:40 )    Color: Yellow / Appearance: Slightly Turbid / S.022 / pH: x  Gluc: x / Ketone: Trace  / Bili: Negative / Urobili: Negative   Blood: x / Protein: 100 / Nitrite: Negative   Leuk Esterase: Large / RBC: 3 /hpf / WBC 30 /HPF   Sq Epi: x / Non Sq Epi: x / Bacteria: x      Arterial Blood Gas:  10-09 @ 00:20  7.45/26/178/18/100/-5.2  ABG lactate: --  Arterial Blood Gas:  10-08 @ 03:35  7.42/27/331/18/100/-5.5  ABG lactate: --  Arterial Blood Gas:  10-08 @ 00:35  7.42/27/187/17/100/-6.0  ABG lactate: --  Arterial Blood Gas:  10-07 @ 19:09  7.16/62/257/21/99/-7.3  ABG lactate: --  Arterial Blood Gas:  10-07 @ 17:29  7.34/34/335/18/100/-6.5  ABG lactate: --  Arterial Blood Gas:  10-07 @ 15:41  7.14/60/71/19/88/-9.0  ABG lactate: --    Venous Blood Gas:  10-07 @ 14:49  7.22/54/32/21/48  VBG Lactate: 2.8  Venous Blood Gas:  10-07 @ 12:39  7.25/54/31/23/47  VBG Lactate: 4.3      MICROBIOLOGY:     RADIOLOGY:  [ ] Reviewed and interpreted by me    EKG: CHIEF COMPLAINT: SOB    Interval Events: Pt was extubated earlier this morning. Pt seen and examined at bedside. Pt is minimally conversive, only stating she is sleepy and in pain.     REVIEW OF SYSTEMS:  Constitutional: [ ] negative [ ] fevers [ ] chills [ ] weight loss [ ] weight gain  HEENT: [ ] negative [ ] dry eyes [ ] eye irritation [ ] postnasal drip [ ] nasal congestion  CV: [ ] negative  [ ] chest pain [ ] orthopnea [ ] palpitations [ ] murmur  Resp: [ ] negative [ ] cough [ ] shortness of breath [ ] dyspnea [ ] wheezing [ ] sputum [ ] hemoptysis  GI: [ ] negative [ ] nausea [ ] vomiting [ ] diarrhea [ ] constipation [ ] abd pain [ ] dysphagia   : [ ] negative [ ] dysuria [ ] nocturia [ ] hematuria [ ] increased urinary frequency  Musculoskeletal: [ ] negative [ ] back pain [ ] myalgias [ ] arthralgias [ ] fracture  Skin: [ ] negative [ ] rash [ ] itch  Neurological: [ ] negative [ ] headache [ ] dizziness [ ] syncope [ ] weakness [ ] numbness  Psychiatric: [ ] negative [ ] anxiety [ ] depression  Endocrine: [ ] negative [ ] diabetes [ ] thyroid problem  Hematologic/Lymphatic: [ ] negative [ ] anemia [ ] bleeding problem  Allergic/Immunologic: [ ] negative [ ] itchy eyes [ ] nasal discharge [ ] hives [ ] angioedema  [ ] All other systems negative  [X] Unable to assess ROS because pt is lethargic and not responding to question    OBJECTIVE:  ICU Vital Signs Last 24 Hrs  T(C): 37 (09 Oct 2020 04:00), Max: 37.2 (08 Oct 2020 16:00)  T(F): 98.6 (09 Oct 2020 04:00), Max: 99 (08 Oct 2020 16:00)  HR: 93 (09 Oct 2020 07:00) (64 - 97)  BP: 152/75 (09 Oct 2020 07:00) (90/55 - 173/98)  BP(mean): 108 (09 Oct 2020 07:00) (67 - 128)  ABP: --  ABP(mean): --  RR: 15 (09 Oct 2020 07:00) (14 - 31)  SpO2: 100% (09 Oct 2020 07:00) (99% - 100%)    Mode: CPAP with PS, FiO2: 21, PEEP: 5, PS: 5, MAP: 7, PIP: 10    10-08 @ 07:01  -  10-09 @ 07:00  --------------------------------------------------------  IN: 470.2 mL / OUT: 640 mL / NET: -169.8 mL      CAPILLARY BLOOD GLUCOSE      POCT Blood Glucose.: 104 mg/dL (09 Oct 2020 05:11)    PHYSICAL EXAM:  General: very petite woman appears stated age, covered in heat packs and blankets.  HEENT: PERRLA, Pt is slow to open eyes and mouth. Sclera anicteric.   Neck: soft, no LAD, reports tender to palpation in the back and unable to put chin to chest due to pain. Can move head side to side without difficulty.  Chest/Lungs: no increased work of breathing   Heart: +tachycardic, +left parasternal systolic murmur, regular rhythm  Abdomen: soft, non-tender, normal BS  Extremities: extremities warm and well perfused, no edema noted  Skin: no rashes or lesions  Neuro: Lethargic, not responding to questions  Psych: not assessed    LINES: R dorsal forearm, L hand and L AC    HOSPITAL MEDICATIONS:  Standing Meds:  artificial  tears Solution 1 Drop(s) Both EYES two times a day  cycloSPORINE (RESTASIS) 0.05% Emulsion 1 Drop(s) Both EYES two times a day  dextrose 5%. 1000 milliLiter(s) IV Continuous <Continuous>  dextrose 50% Injectable 12.5 Gram(s) IV Push once  dextrose 50% Injectable 25 Gram(s) IV Push once  dextrose 50% Injectable 25 Gram(s) IV Push once  insulin lispro (HumaLOG) corrective regimen sliding scale   SubCutaneous every 6 hours  levothyroxine Injectable 50 MICROGram(s) IV Push at bedtime  meropenem  IVPB 2000 milliGRAM(s) IV Intermittent every 8 hours  prednisoLONE acetate 1% Suspension 1 Drop(s) Both EYES two times a day      PRN Meds:  acetaminophen    Suspension .. 650 milliGRAM(s) Oral every 4 hours PRN  dextrose 40% Gel 15 Gram(s) Oral once PRN  glucagon  Injectable 1 milliGRAM(s) IntraMuscular once PRN      LABS:                        8.1    8.20  )-----------( 136      ( 09 Oct 2020 00:21 )             25.8     Hgb Trend: 8.1<--, 9.5<--, 9.4<--  10-09    129<L>  |  101  |  11  ----------------------------<  148<H>  4.4   |  16<L>  |  <0.30<L>    Ca    9.1      09 Oct 2020 00:21  Phos  2.5     10-09  Mg     2.1     10-09    TPro  6.0  /  Alb  2.9<L>  /  TBili  0.4  /  DBili  x   /  AST  38  /  ALT  57<H>  /  AlkPhos  41  10-09    Creatinine Trend: <0.30<--, <0.30<--, <0.30<--, <0.30<--, <0.30<--, 0.37<--  PT/INR - ( 09 Oct 2020 00:21 )   PT: 13.6 sec;   INR: 1.14 ratio         PTT - ( 09 Oct 2020 00:21 )  PTT:30.2 sec  Urinalysis Basic - ( 07 Oct 2020 12:40 )    Color: Yellow / Appearance: Slightly Turbid / S.022 / pH: x  Gluc: x / Ketone: Trace  / Bili: Negative / Urobili: Negative   Blood: x / Protein: 100 / Nitrite: Negative   Leuk Esterase: Large / RBC: 3 /hpf / WBC 30 /HPF   Sq Epi: x / Non Sq Epi: x / Bacteria: x      Arterial Blood Gas:  10-09 @ 00:20  7.45/26/178/18/100/-5.2  ABG lactate: --  Arterial Blood Gas:  10-08 @ 03:35  7.42/27/331/18/100/-5.5  ABG lactate: --  Arterial Blood Gas:  10-08 @ 00:35  7.42/27/187/17/100/-6.0  ABG lactate: --  Arterial Blood Gas:  10-07 @ 19:09  7.16/62/257/21/99/-7.3  ABG lactate: --  Arterial Blood Gas:  10-07 @ 17:29  7.34/34/335/18/100/-6.5  ABG lactate: --  Arterial Blood Gas:  10-07 @ 15:41  7.14/60/71/19/88/-9.0  ABG lactate: --    Venous Blood Gas:  10-07 @ 14:49  7.22/54/32/21/48  VBG Lactate: 2.8  Venous Blood Gas:  10-07 @ 12:39  7.25/54/31/23/47  VBG Lactate: 4.3      MICROBIOLOGY:     RADIOLOGY:  [ ] Reviewed and interpreted by me    EKG: CHIEF COMPLAINT: SOB    Interval Events: Pt was extubated earlier this morning. Pt seen and examined at bedside. Pt is minimally conversive, only stating she is sleepy and in pain.     REVIEW OF SYSTEMS:  Constitutional: [ ] negative [ ] fevers [ ] chills [ ] weight loss [ ] weight gain  HEENT: [ ] negative [ ] dry eyes [ ] eye irritation [ ] postnasal drip [ ] nasal congestion  CV: [ ] negative  [ ] chest pain [ ] orthopnea [ ] palpitations [ ] murmur  Resp: [ ] negative [ ] cough [ ] shortness of breath [ ] dyspnea [ ] wheezing [ ] sputum [ ] hemoptysis  GI: [ ] negative [ ] nausea [ ] vomiting [ ] diarrhea [ ] constipation [ ] abd pain [ ] dysphagia   : [ ] negative [ ] dysuria [ ] nocturia [ ] hematuria [ ] increased urinary frequency  Musculoskeletal: [ ] negative [ ] back pain [ ] myalgias [ ] arthralgias [ ] fracture  Skin: [ ] negative [ ] rash [ ] itch  Neurological: [ ] negative [ ] headache [ ] dizziness [ ] syncope [ ] weakness [ ] numbness  Psychiatric: [ ] negative [ ] anxiety [ ] depression  Endocrine: [ ] negative [ ] diabetes [ ] thyroid problem  Hematologic/Lymphatic: [ ] negative [ ] anemia [ ] bleeding problem  Allergic/Immunologic: [ ] negative [ ] itchy eyes [ ] nasal discharge [ ] hives [ ] angioedema  [ ] All other systems negative  [X] Unable to assess ROS because pt is lethargic and not responding to question    OBJECTIVE:  ICU Vital Signs Last 24 Hrs  T(C): 37 (09 Oct 2020 04:00), Max: 37.2 (08 Oct 2020 16:00)  T(F): 98.6 (09 Oct 2020 04:00), Max: 99 (08 Oct 2020 16:00)  HR: 93 (09 Oct 2020 07:00) (64 - 97)  BP: 152/75 (09 Oct 2020 07:00) (90/55 - 173/98)  BP(mean): 108 (09 Oct 2020 07:00) (67 - 128)  ABP: --  ABP(mean): --  RR: 15 (09 Oct 2020 07:00) (14 - 31)  SpO2: 100% (09 Oct 2020 07:00) (99% - 100%)    Mode: CPAP with PS, FiO2: 21, PEEP: 5, PS: 5, MAP: 7, PIP: 10    10-08 @ 07:01  -  10-09 @ 07:00  --------------------------------------------------------  IN: 470.2 mL / OUT: 640 mL / NET: -169.8 mL      CAPILLARY BLOOD GLUCOSE      POCT Blood Glucose.: 104 mg/dL (09 Oct 2020 05:11)    PHYSICAL EXAM:  General: very petite woman appears stated age, covered in heat packs and blankets.  HEENT: PERRLA, Pt is slow to open eyes and mouth. Sclera anicteric.   Neck: soft, no LAD, reports tender to palpation in the back and unable to put chin to chest due to pain. Can move head side to side without difficulty.  Chest/Lungs: looks comfortable over all, but +belly breathing  Heart: +tachycardic, +left parasternal systolic murmur, regular rhythm  Abdomen: soft, non-tender, normal BS  Extremities: extremities warm and well perfused, no edema noted  Skin: no rashes or lesions  Neuro: Lethargic, not responding to questions  Psych: not assessed    LINES: R dorsal forearm, L hand and L     HOSPITAL MEDICATIONS:  Standing Meds:  artificial  tears Solution 1 Drop(s) Both EYES two times a day  cycloSPORINE (RESTASIS) 0.05% Emulsion 1 Drop(s) Both EYES two times a day  dextrose 5%. 1000 milliLiter(s) IV Continuous <Continuous>  dextrose 50% Injectable 12.5 Gram(s) IV Push once  dextrose 50% Injectable 25 Gram(s) IV Push once  dextrose 50% Injectable 25 Gram(s) IV Push once  insulin lispro (HumaLOG) corrective regimen sliding scale   SubCutaneous every 6 hours  levothyroxine Injectable 50 MICROGram(s) IV Push at bedtime  meropenem  IVPB 2000 milliGRAM(s) IV Intermittent every 8 hours  prednisoLONE acetate 1% Suspension 1 Drop(s) Both EYES two times a day      PRN Meds:  acetaminophen    Suspension .. 650 milliGRAM(s) Oral every 4 hours PRN  dextrose 40% Gel 15 Gram(s) Oral once PRN  glucagon  Injectable 1 milliGRAM(s) IntraMuscular once PRN      LABS:                        8.1    8.20  )-----------( 136      ( 09 Oct 2020 00:21 )             25.8     Hgb Trend: 8.1<--, 9.5<--, 9.4<--  10    129<L>  |  101  |  11  ----------------------------<  148<H>  4.4   |  16<L>  |  <0.30<L>    Ca    9.1      09 Oct 2020 00:21  Phos  2.5     10-09  Mg     2.1     10-09    TPro  6.0  /  Alb  2.9<L>  /  TBili  0.4  /  DBili  x   /  AST  38  /  ALT  57<H>  /  AlkPhos  41  10-09    Creatinine Trend: <0.30<--, <0.30<--, <0.30<--, <0.30<--, <0.30<--, 0.37<--  PT/INR - ( 09 Oct 2020 00:21 )   PT: 13.6 sec;   INR: 1.14 ratio         PTT - ( 09 Oct 2020 00:21 )  PTT:30.2 sec  Urinalysis Basic - ( 07 Oct 2020 12:40 )    Color: Yellow / Appearance: Slightly Turbid / S.022 / pH: x  Gluc: x / Ketone: Trace  / Bili: Negative / Urobili: Negative   Blood: x / Protein: 100 / Nitrite: Negative   Leuk Esterase: Large / RBC: 3 /hpf / WBC 30 /HPF   Sq Epi: x / Non Sq Epi: x / Bacteria: x      Arterial Blood Gas:  10-09 @ 00:20  7.45/26/178/18/100/-5.2  ABG lactate: --  Arterial Blood Gas:  10-08 @ 03:35  7.42/27/331/18/100/-5.5  ABG lactate: --  Arterial Blood Gas:  10-08 @ 00:35  7.42/27/187/17/100/-6.0  ABG lactate: --  Arterial Blood Gas:  10-07 @ 19:09  7.16/62/257/21/99/-7.3  ABG lactate: --  Arterial Blood Gas:  10-07 @ 17:29  7.34/34/335/18/100/-6.5  ABG lactate: --  Arterial Blood Gas:  10-07 @ 15:41  7.14/60/71/19/88/-9.0  ABG lactate: --    Venous Blood Gas:  10-07 @ 14:49  7.22/54/32/21/48  VBG Lactate: 2.8  Venous Blood Gas:  10-07 @ 12:39  7.25/54/31//47  VBG Lactate: 4.3      MICROBIOLOGY:     RADIOLOGY:  [ ] Reviewed and interpreted by me    EKG:

## 2020-10-09 NOTE — PROGRESS NOTE ADULT - ASSESSMENT
70 yo female with RA , DM,HTN,fibromyalgia, and remote repair of cerebral aneurysm now admitted with fever,weakness and hypoxia.  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  She is immune suppressed at home on embrel and 5 mg of prednisone bid  Listeria is not typically associated with pneumonia  She was alert but has had mild headaches, I would be concerned about CNS spread.\  She recalls a rash to PCN, therefor is on meropenem which she appears to be tolerating  RVP is negative, MRSA screen positive  Suggest:  1.Repeat blood cultures to assess for bloodstream clearance  2.She will need baseline echo, if bacteremia is sustained may need to consider a JAIME  3.I would consider LP although with bacteremia in setting of immunocompromise the duration of treatment typically extended to 3 weeks  4.The 2 major alternatives to Ampicillin will be meropenem and Bactrim, however the doses of bactrim used are on the high end, 15-20 mg/kg of TMP component daily

## 2020-10-09 NOTE — CHART NOTE - NSCHARTNOTEFT_GEN_A_CORE
HPI:  HPI / INTERVAL HISTORY:  Patient is a 69 yo F with history of RA, DM, HTN, fibromyalgia, hypothyroidism, brain aneurysm s/p repair BIBEMS for shortness of breath and fever. Per EMS, patient has had 3 days of fever, Tmax 102.5 this morning 7-8 AM, no known COVID exposures, took tylenol at unknown time before being brought in. Symptoms started on Monday. Per EMS, vitals on scene were 114/70, RA 68 improved NRB to 100%. She also has not taken her meds in 2 days. Patient c/o feeling fatigued, short of breath. Denies chest pain. Patient uses a wheelchair at baseline 2/2 arthritis.    Seen with resp distress, hypoxia and tachycardia in ED.  Placed on BiPAP.  Continued to be hypoxic w/ AMS.  Intubated for hypoxemic respiratory failure likely 2/2 PNA.  CXR suspicious for novel corona virus PNA, will be accepted to PICU for COVID-19 r/o.    MICU Course:  Pt was intubated on 10/7. Pt initially required levophed, but was stopped and became hypertensive. Pt was started on vanco, ceftriaxone, and azithromycin. COVID and RVP were negative. Urine legionella was negative. Blood cultures grew listeria and meropenem was started. Other abx discontinued.  Pt was extubated on 10/9 at 5am and mental status improved. Pt passed bedside swallow and was tolerating PO with regular diet. HPI / INTERVAL HISTORY:  Patient is a 69 yo F with history of RA, DM, HTN, fibromyalgia, hypothyroidism, brain aneurysm s/p repair BIBEMS for shortness of breath and fever. Per EMS, patient has had 3 days of fever, Tmax 102.5 this morning 7-8 AM, no known COVID exposures, took tylenol at unknown time before being brought in. Symptoms started on Monday. Per EMS, vitals on scene were 114/70, RA 68 improved NRB to 100%. She also has not taken her meds in 2 days. Patient c/o feeling fatigued, short of breath. Denies chest pain. Patient uses a wheelchair at baseline 2/2 arthritis.    Seen with resp distress, hypoxia and tachycardia in ED.  Placed on BiPAP.  Continued to be hypoxic w/ AMS.  Intubated for hypoxemic respiratory failure likely 2/2 PNA.  CXR suspicious for novel corona virus PNA, will be accepted to PICU for COVID-19 r/o.    MICU Course:  Pt was intubated on 10/7. Pt initially required levophed, but was stopped and became hypertensive. Pt was started on vanco, ceftriaxone, and azithromycin. COVID and RVP were negative. Urine legionella was negative. Blood cultures grew listeria and meropenem was started. Other abx discontinued.  Pt was extubated on 10/9 at 5am and mental status improved. Pt passed bedside swallow and was tolerating PO with regular diet.                            7.1    2.28  )-----------( 126      ( 11 Oct 2020 00:43 )             23.5       10-11    130<L>  |  98  |  7   ----------------------------<  236<H>  3.7   |  22  |  <0.30<L>    Ca    8.3<L>      11 Oct 2020 00:43  Phos  2.0     10-11  Mg     1.8     10-11    TPro  5.5<L>  /  Alb  2.8<L>  /  TBili  0.3  /  DBili  x   /  AST  14  /  ALT  30  /  AlkPhos  41  10-11          PT/INR - ( 11 Oct 2020 00:42 )   PT: 14.5 sec;   INR: 1.22 ratio    PTT - ( 11 Oct 2020 00:42 )  PTT:27.9 sec    Lactate Trend      CARDIAC MARKERS ( 10 Oct 2020 17:22 )  x     / x     / 144 U/L / x     / 2.6 ng/mL        CAPILLARY BLOOD GLUCOSE      POCT Blood Glucose.: 192 mg/dL (11 Oct 2020 14:16)        Culture Results:   No growth to date. (10-09 @ 02:19)  Culture Results:   No growth to date. (10-09 @ 02:19)  Culture Results:   Normal Respiratory Shanda present (10-08 @ 12:31)  Culture Results:   >=3 organisms. Probable collection contamination. (10-07 @ 15:26)  Culture Results:   Growth in aerobic and anaerobic bottles: Listeria monocytogenes  Resistance to ampicillin or penicillin for Listeria  monocytogenes has not been described. L. monocytogenes is intrinsically  resistant to cephalosporins. (10-07 @ 15:07)  Culture Results:   Listeria monocytogenes  Resistance to ampicillin or penicillin for Listeria  monocytogenes has not been described. L. monocytogenes is intrinsically  resistant to cephalosporins.  "Due to technical problems, Proteus sp. will Not be reported as partof  the BCID panel until further notice"  ***Blood Panel PCR results on this specimen are available  approximately 3 hours after the Gram stain result.***  Gram stain, PCR, and/or culture results may not always  correspond due to difference in methodologies.  ************************************************************  This PCR assay was performed using [employer].  The following targets are tested for: Enterococcus,  vancomycin resistant enterococci, Listeria monocytogenes,  coagulase negative staphylococci, S. aureus,  methicillin resistant S. aureus, Streptococcus agalactiae  (Group B), S. pneumoniae, S. pyogenes (Group A),  Acinetobacter baumannii, Enterobacter cloacae, E. coli,  Klebsiella oxytoca, K. pneumoniae, Proteus sp.,  Serratia marcescens, Haemophilus influenzae,  Neisseria meningitidis, Pseudomonas aeruginosa, Candida  albicans, C. glabrata, C krusei, C parapsilosis,  C. tropicalis and the KPC resistance gene. (10-07 @ 15:07)      ASSESSMENT/PLAN  Patient is a 69yo F w/ RA, DM, HTN, fibromyalgia, hypothyroidism, brain aneurysm s/p repair BIBEMS for AMS brought to MICU for intubation for respiratory distress likely 2/2 multifocal PNA vs atelectasis vs bacteremia, found to be bacteremic with listeria and on meropenem, now extubated on RA.     #Neuro   - Off pressors  - mental status improving, able to secure own airway  - will consider LP once patient medically stable    #Cardiovascular  - HTN regimen: Labetalol 200 PO TID, lisinopril 20 qd  - did not require hydralazine pushes yesterday     #Pulmonary  Acute Hypoxemic Respiratory Failure  - likely 2/2 atelectasis demonstrated on CT chest  - extubated, breathing on RA    #FEN/GI  - Tolerated regular diet.  - transaminitis stable and resolving  - Abd x-ray showed nonspecific, nonobstructive gas pattern. Pt reports no BM since hospital arrival.  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 meropenem use  - Consulted hematology 10/11 regarding increasing pancytopenia per ID recs before considering transition to Bactrim    Normocytic anemia  - Stable  - f/u iron studies, B12, folate, retic  - pt can't take iron due to constipation    #ID  Blood cultures grew listeria, concern for meningitis   - c/w meropenem for now, pt gets hives from penicillin  - Repeat cultures neg on 10/9/2020. 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 Currently SCDs and fondaparinux 2.5 subQ     #Med Rec  - done    #Ethics  - FULL CODE    TO DO:  [] c/w abx, convert to PO  [] f/u heme c/s for pancytopenia HPI / INTERVAL HISTORY:  Patient is a 69 yo F with history of RA, DM, HTN, fibromyalgia, hypothyroidism, brain aneurysm s/p repair BIBEMS for shortness of breath and fever. Per EMS, patient has had 3 days of fever, Tmax 102.5 this morning 7-8 AM, no known COVID exposures, took tylenol at unknown time before being brought in. Symptoms started on Monday. Per EMS, vitals on scene were 114/70, RA 68 improved NRB to 100%. She also has not taken her meds in 2 days. Patient c/o feeling fatigued, short of breath. Denies chest pain. Patient uses a wheelchair at baseline 2/2 arthritis.    Seen with resp distress, hypoxia and tachycardia in ED.  Placed on BiPAP.  Continued to be hypoxic w/ AMS.  Intubated for hypoxemic respiratory failure likely 2/2 PNA.  CXR suspicious for novel corona virus PNA, will be accepted to PICU for COVID-19 r/o.    MICU Course:  Pt was intubated on 10/7. Pt initially required levophed, but was stopped and became hypertensive. Pt was started on vanco, ceftriaxone, and azithromycin. COVID and RVP were negative. Urine legionella was negative. Blood cultures grew listeria and meropenem was started. Other abx discontinued.  Pt was extubated on 10/9 at 5am and mental status improved. Pt passed bedside swallow and was tolerating PO with regular diet.                            7.1    2.28  )-----------( 126      ( 11 Oct 2020 00:43 )             23.5       10-11    130<L>  |  98  |  7   ----------------------------<  236<H>  3.7   |  22  |  <0.30<L>    Ca    8.3<L>      11 Oct 2020 00:43  Phos  2.0     10-11  Mg     1.8     10-11    TPro  5.5<L>  /  Alb  2.8<L>  /  TBili  0.3  /  DBili  x   /  AST  14  /  ALT  30  /  AlkPhos  41  10-11          PT/INR - ( 11 Oct 2020 00:42 )   PT: 14.5 sec;   INR: 1.22 ratio    PTT - ( 11 Oct 2020 00:42 )  PTT:27.9 sec    Lactate Trend      CARDIAC MARKERS ( 10 Oct 2020 17:22 )  x     / x     / 144 U/L / x     / 2.6 ng/mL        CAPILLARY BLOOD GLUCOSE      POCT Blood Glucose.: 192 mg/dL (11 Oct 2020 14:16)        Culture Results:   No growth to date. (10-09 @ 02:19)  Culture Results:   No growth to date. (10-09 @ 02:19)  Culture Results:   Normal Respiratory Shanda present (10-08 @ 12:31)  Culture Results:   >=3 organisms. Probable collection contamination. (10-07 @ 15:26)  Culture Results:   Growth in aerobic and anaerobic bottles: Listeria monocytogenes  Resistance to ampicillin or penicillin for Listeria  monocytogenes has not been described. L. monocytogenes is intrinsically  resistant to cephalosporins. (10-07 @ 15:07)  Culture Results:   Listeria monocytogenes  Resistance to ampicillin or penicillin for Listeria  monocytogenes has not been described. L. monocytogenes is intrinsically  resistant to cephalosporins.  "Due to technical problems, Proteus sp. will Not be reported as partof  the BCID panel until further notice"  ***Blood Panel PCR results on this specimen are available  approximately 3 hours after the Gram stain result.***  Gram stain, PCR, and/or culture results may not always  correspond due to difference in methodologies.  ************************************************************  This PCR assay was performed using [employer].  The following targets are tested for: Enterococcus,  vancomycin resistant enterococci, Listeria monocytogenes,  coagulase negative staphylococci, S. aureus,  methicillin resistant S. aureus, Streptococcus agalactiae  (Group B), S. pneumoniae, S. pyogenes (Group A),  Acinetobacter baumannii, Enterobacter cloacae, E. coli,  Klebsiella oxytoca, K. pneumoniae, Proteus sp.,  Serratia marcescens, Haemophilus influenzae,  Neisseria meningitidis, Pseudomonas aeruginosa, Candida  albicans, C. glabrata, C krusei, C parapsilosis,  C. tropicalis and the KPC resistance gene. (10-07 @ 15:07)      ASSESSMENT/PLAN  Patient is a 71yo F w/ RA, DM, HTN, fibromyalgia, hypothyroidism, brain aneurysm s/p repair BIBEMS for AMS brought to MICU for intubation for respiratory distress likely 2/2 multifocal PNA vs atelectasis vs bacteremia, found to be bacteremic with listeria and on meropenem, now extubated on RA.     #Neuro   - Off pressors  - mental status improving, able to secure own airway  - will consider LP once patient medically stable    #Cardiovascular  - HTN regimen: Labetalol 200 PO TID, lisinopril 20 qd  - did not require hydralazine pushes yesterday     #Pulmonary  Acute Hypoxemic Respiratory Failure  - likely 2/2 atelectasis demonstrated on CT chest  - extubated, breathing on RA    #FEN/GI  - Tolerated regular diet.  - transaminitis stable and resolving  - Abd x-ray showed nonspecific, nonobstructive gas pattern. Pt reports no BM since hospital arrival.  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 meropenem use  - Consulted hematology 10/11 regarding increasing pancytopenia per ID recs before considering transition to Bactrim    Normocytic anemia  - Stable  - f/u iron studies, B12, folate, retic  - pt can't take iron due to constipation    #ID  Blood cultures grew listeria, concern for meningitis   - c/w meropenem for now, pt gets hives from penicillin  - Repeat cultures neg on 10/9/2020. 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 Currently SCDs and fondaparinux 2.5 subQ     #Med Rec  - done    #Ethics  - FULL CODE    TO DO:  [] c/w abx, convert to PO  [] f/u heme c/s for pancytopenia  [] f/u pulm recs  [] f/u ID recs HPI / INTERVAL HISTORY:  Patient is a 69 yo F with history of RA, DM, HTN, fibromyalgia, hypothyroidism, brain aneurysm s/p repair BIBEMS for shortness of breath and fever. Per EMS, patient has had 3 days of fever, Tmax 102.5, no known COVID exposures, took tylenol at unknown time before being brought in. Symptoms started on Monday. Per EMS, vitals on scene were 114/70, RA 68 improved NRB to 100%. She also had not taken her meds in 2 days. Patient c/o feeling fatigued, short of breath. Denies chest pain. Patient uses a wheelchair at baseline 2/2 arthritis.    Seen with resp distress, hypoxia and tachycardia in ED.  Placed on BiPAP.  Continued to be hypoxic w/ AMS.  Intubated for hypoxemic respiratory failure likely 2/2 atelectasis.    MICU Course:  Pt was intubated on 10/7. Pt initially required levophed in ED, but was weaned. Pt was started on vanco, ceftriaxone, and azithromycin. COVID and RVP were negative. Urine legionella was negative. Blood cultures grew listeria and meropenem was started. Other abx discontinued.  Pt was extubated on 10/9 at 5am and mental status improved. Pt passed bedside swallow and was tolerating PO with regular diet.                            7.1    2.28  )-----------( 126      ( 11 Oct 2020 00:43 )             23.5       10-11    130<L>  |  98  |  7   ----------------------------<  236<H>  3.7   |  22  |  <0.30<L>    Ca    8.3<L>      11 Oct 2020 00:43  Phos  2.0     10-11  Mg     1.8     10-11    TPro  5.5<L>  /  Alb  2.8<L>  /  TBili  0.3  /  DBili  x   /  AST  14  /  ALT  30  /  AlkPhos  41  10-11          PT/INR - ( 11 Oct 2020 00:42 )   PT: 14.5 sec;   INR: 1.22 ratio    PTT - ( 11 Oct 2020 00:42 )  PTT:27.9 sec    Lactate Trend      CARDIAC MARKERS ( 10 Oct 2020 17:22 )  x     / x     / 144 U/L / x     / 2.6 ng/mL        CAPILLARY BLOOD GLUCOSE      POCT Blood Glucose.: 192 mg/dL (11 Oct 2020 14:16)        Culture Results:   No growth to date. (10-09 @ 02:19)  Culture Results:   No growth to date. (10-09 @ 02:19)  Culture Results:   Normal Respiratory Shanda present (10-08 @ 12:31)  Culture Results:   >=3 organisms. Probable collection contamination. (10-07 @ 15:26)  Culture Results:   Growth in aerobic and anaerobic bottles: Listeria monocytogenes  Resistance to ampicillin or penicillin for Listeria  monocytogenes has not been described. L. monocytogenes is intrinsically  resistant to cephalosporins. (10-07 @ 15:07)  Culture Results:   Listeria monocytogenes  Resistance to ampicillin or penicillin for Listeria  monocytogenes has not been described. L. monocytogenes is intrinsically  resistant to cephalosporins.  "Due to technical problems, Proteus sp. will Not be reported as partof  the BCID panel until further notice"  ***Blood Panel PCR results on this specimen are available  approximately 3 hours after the Gram stain result.***  Gram stain, PCR, and/or culture results may not always  correspond due to difference in methodologies.  ************************************************************  This PCR assay was performed using [employer].  The following targets are tested for: Enterococcus,  vancomycin resistant enterococci, Listeria monocytogenes,  coagulase negative staphylococci, S. aureus,  methicillin resistant S. aureus, Streptococcus agalactiae  (Group B), S. pneumoniae, S. pyogenes (Group A),  Acinetobacter baumannii, Enterobacter cloacae, E. coli,  Klebsiella oxytoca, K. pneumoniae, Proteus sp.,  Serratia marcescens, Haemophilus influenzae,  Neisseria meningitidis, Pseudomonas aeruginosa, Candida  albicans, C. glabrata, C krusei, C parapsilosis,  C. tropicalis and the KPC resistance gene. (10-07 @ 15:07)      ASSESSMENT/PLAN  Patient is a 71yo F w/ RA, DM, HTN, fibromyalgia, hypothyroidism, brain aneurysm s/p repair BIBEMS for AMS brought to MICU for intubation for respiratory distress likely 2/2 multifocal PNA vs atelectasis vs bacteremia, found to be bacteremic with listeria and on meropenem, now extubated on RA.     #Neuro   - Off pressors  - mental status improving, able to secure own airway  - will consider LP once patient medically stable    #Cardiovascular  - HTN regimen: Labetalol 200 PO TID, lisinopril 20 qd  - did not require hydralazine pushes yesterday     #Pulmonary  Acute Hypoxemic Respiratory Failure  - likely 2/2 atelectasis demonstrated on CT chest  - extubated, breathing on RA    #FEN/GI  - Tolerated regular diet.  - transaminitis stable and resolving  - Abd x-ray showed nonspecific, nonobstructive gas pattern. Pt reports no BM since hospital arrival.  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 meropenem use  - Consulted hematology 10/11 regarding increasing pancytopenia per ID recs before considering transition to Bactrim    Normocytic anemia  - Stable  - f/u iron studies, B12, folate, retic  - pt can't take iron due to constipation    #ID  Blood cultures grew listeria, concern for meningitis   - c/w meropenem for now, pt gets hives from penicillin  - Repeat cultures neg on 10/9/2020. 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 Currently SCDs and fondaparinux 2.5 subQ     #Med Rec  - done    #Ethics  - FULL CODE    TO DO:  [] c/w abx, convert to PO  [] f/u heme c/s for pancytopenia  [] f/u pulm recs  [] f/u ID recs HPI / INTERVAL HISTORY:  Patient is a 71 yo F with history of RA, DM, HTN, fibromyalgia, hypothyroidism, brain aneurysm s/p repair BIBEMS for shortness of breath and fever. Per EMS, patient has had 3 days of fever, Tmax 102.5, no known COVID exposures, took tylenol at unknown time before being brought in. Symptoms started on Monday. Per EMS, vitals on scene were 114/70, RA 68 improved NRB to 100%. She also had not taken her meds in 2 days. Patient c/o feeling fatigued, short of breath. Denies chest pain. Patient uses a wheelchair at baseline 2/2 arthritis.    Seen with resp distress, hypoxia and tachycardia in ED.  Placed on BiPAP.  Continued to be hypoxic w/ AMS.  Intubated for hypoxemic respiratory failure likely 2/2 atelectasis.    MICU Course:  Pt was intubated on 10/7. Pt initially required levophed in ED, but was weaned. Pt was started on vanco, ceftriaxone, and azithromycin. COVID and RVP were negative. Urine legionella was negative. Blood cultures grew listeria and meropenem was started. Other abx discontinued.  Pt was extubated on 10/9 at 5am and mental status improved. Pt passed bedside swallow and was tolerating PO with regular diet.                            7.1    2.28  )-----------( 126      ( 11 Oct 2020 00:43 )             23.5       10-11    130<L>  |  98  |  7   ----------------------------<  236<H>  3.7   |  22  |  <0.30<L>    Ca    8.3<L>      11 Oct 2020 00:43  Phos  2.0     10-11  Mg     1.8     10-11    TPro  5.5<L>  /  Alb  2.8<L>  /  TBili  0.3  /  DBili  x   /  AST  14  /  ALT  30  /  AlkPhos  41  10-11          PT/INR - ( 11 Oct 2020 00:42 )   PT: 14.5 sec;   INR: 1.22 ratio    PTT - ( 11 Oct 2020 00:42 )  PTT:27.9 sec    Lactate Trend      CARDIAC MARKERS ( 10 Oct 2020 17:22 )  x     / x     / 144 U/L / x     / 2.6 ng/mL        CAPILLARY BLOOD GLUCOSE      POCT Blood Glucose.: 192 mg/dL (11 Oct 2020 14:16)        Culture Results:   No growth to date. (10-09 @ 02:19)  Culture Results:   No growth to date. (10-09 @ 02:19)  Culture Results:   Normal Respiratory Shanda present (10-08 @ 12:31)  Culture Results:   >=3 organisms. Probable collection contamination. (10-07 @ 15:26)  Culture Results:   Growth in aerobic and anaerobic bottles: Listeria monocytogenes  Resistance to ampicillin or penicillin for Listeria  monocytogenes has not been described. L. monocytogenes is intrinsically  resistant to cephalosporins. (10-07 @ 15:07)  Culture Results:   Listeria monocytogenes  Resistance to ampicillin or penicillin for Listeria  monocytogenes has not been described. L. monocytogenes is intrinsically  resistant to cephalosporins.  "Due to technical problems, Proteus sp. will Not be reported as partof  the BCID panel until further notice"  ***Blood Panel PCR results on this specimen are available  approximately 3 hours after the Gram stain result.***  Gram stain, PCR, and/or culture results may not always  correspond due to difference in methodologies.  ************************************************************  This PCR assay was performed using [employer].  The following targets are tested for: Enterococcus,  vancomycin resistant enterococci, Listeria monocytogenes,  coagulase negative staphylococci, S. aureus,  methicillin resistant S. aureus, Streptococcus agalactiae  (Group B), S. pneumoniae, S. pyogenes (Group A),  Acinetobacter baumannii, Enterobacter cloacae, E. coli,  Klebsiella oxytoca, K. pneumoniae, Proteus sp.,  Serratia marcescens, Haemophilus influenzae,  Neisseria meningitidis, Pseudomonas aeruginosa, Candida  albicans, C. glabrata, C krusei, C parapsilosis,  C. tropicalis and the KPC resistance gene. (10-07 @ 15:07)      ASSESSMENT/PLAN  Patient is a 69yo F w/ RA, DM, HTN, fibromyalgia, hypothyroidism, brain aneurysm s/p repair BIBEMS for AMS brought to MICU for intubation for respiratory distress likely 2/2 multifocal PNA vs atelectasis vs bacteremia, found to be bacteremic with listeria and on meropenem, now extubated on RA.     #Neuro   - Off pressors  - mental status improving, able to secure own airway  - will consider LP once patient medically stable    #Cardiovascular  - HTN regimen: Labetalol 200 PO TID, lisinopril 20 qd  - did not require hydralazine pushes yesterday     #Pulmonary  Acute Hypoxemic Respiratory Failure  - likely 2/2 atelectasis demonstrated on CT chest  - extubated, breathing on RA    #FEN/GI  - Tolerated regular diet.  - transaminitis stable and resolving  - Abd x-ray showed nonspecific, nonobstructive gas pattern. Pt reports no BM since hospital arrival.  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 meropenem use  - Consulted hematology 10/11 regarding increasing pancytopenia per ID recs before considering transition to Bactrim    Normocytic anemia  - Stable  - f/u iron studies, B12, folate, retic  - pt can't take iron due to constipation    #ID  Blood cultures grew listeria, concern for meningitis   - c/w meropenem for now, pt gets hives from penicillin  - Repeat cultures neg on 10/9/2020. 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 Currently SCDs and fondaparinux 2.5 subQ     #Med Rec  - done    #Ethics  - FULL CODE    TO DO:  [] c/w abx, convert to PO  [] f/u heme c/s for pancytopenia  [] f/u pulm recs  [] f/u ID recs  [] may need addition antihypertensives when she leaves  [] restart PO prednisone 5 10/12 for RA [x] medicine    Accepting Physician: Dr. Floers    HPI / INTERVAL HISTORY:  Patient is a 71 yo F with history of RA, DM, HTN, fibromyalgia, hypothyroidism, brain aneurysm s/p repair BIBEMS for shortness of breath and fever. Per EMS, patient has had 3 days of fever, Tmax 102.5, no known COVID exposures, took tylenol at unknown time before being brought in. Symptoms started on Monday. Per EMS, vitals on scene were 114/70, RA 68 improved NRB to 100%. She also had not taken her meds in 2 days. Patient c/o feeling fatigued, short of breath. Denies chest pain. Patient uses a wheelchair at baseline 2/2 arthritis.    Seen with resp distress, hypoxia and tachycardia in ED.  Placed on BiPAP.  Continued to be hypoxic w/ AMS.  Intubated for hypoxemic respiratory failure likely 2/2 atelectasis.    MICU Course:  Pt was intubated on 10/7. Pt initially required levophed in ED, but was weaned. Pt was started on vanco, ceftriaxone, and azithromycin. COVID and RVP were negative. Urine legionella was negative. Blood cultures grew listeria and meropenem was started. Other abx discontinued.  Pt was extubated on 10/9 at 5am and mental status improved. Pt passed bedside swallow and was tolerating PO with regular diet.                            7.1    2.28  )-----------( 126      ( 11 Oct 2020 00:43 )             23.5       10-11    130<L>  |  98  |  7   ----------------------------<  236<H>  3.7   |  22  |  <0.30<L>    Ca    8.3<L>      11 Oct 2020 00:43  Phos  2.0     10-11  Mg     1.8     10-11    TPro  5.5<L>  /  Alb  2.8<L>  /  TBili  0.3  /  DBili  x   /  AST  14  /  ALT  30  /  AlkPhos  41  10-11          PT/INR - ( 11 Oct 2020 00:42 )   PT: 14.5 sec;   INR: 1.22 ratio    PTT - ( 11 Oct 2020 00:42 )  PTT:27.9 sec    Lactate Trend      CARDIAC MARKERS ( 10 Oct 2020 17:22 )  x     / x     / 144 U/L / x     / 2.6 ng/mL        CAPILLARY BLOOD GLUCOSE      POCT Blood Glucose.: 192 mg/dL (11 Oct 2020 14:16)        Culture Results:   No growth to date. (10-09 @ 02:19)  Culture Results:   No growth to date. (10-09 @ 02:19)  Culture Results:   Normal Respiratory Shanda present (10-08 @ 12:31)  Culture Results:   >=3 organisms. Probable collection contamination. (10-07 @ 15:26)  Culture Results:   Growth in aerobic and anaerobic bottles: Listeria monocytogenes  Resistance to ampicillin or penicillin for Listeria  monocytogenes has not been described. L. monocytogenes is intrinsically  resistant to cephalosporins. (10-07 @ 15:07)  Culture Results:   Listeria monocytogenes  Resistance to ampicillin or penicillin for Listeria  monocytogenes has not been described. L. monocytogenes is intrinsically  resistant to cephalosporins.  "Due to technical problems, Proteus sp. will Not be reported as partof  the BCID panel until further notice"  ***Blood Panel PCR results on this specimen are available  approximately 3 hours after the Gram stain result.***  Gram stain, PCR, and/or culture results may not always  correspond due to difference in methodologies.  ************************************************************  This PCR assay was performed using [employer].  The following targets are tested for: Enterococcus,  vancomycin resistant enterococci, Listeria monocytogenes,  coagulase negative staphylococci, S. aureus,  methicillin resistant S. aureus, Streptococcus agalactiae  (Group B), S. pneumoniae, S. pyogenes (Group A),  Acinetobacter baumannii, Enterobacter cloacae, E. coli,  Klebsiella oxytoca, K. pneumoniae, Proteus sp.,  Serratia marcescens, Haemophilus influenzae,  Neisseria meningitidis, Pseudomonas aeruginosa, Candida  albicans, C. glabrata, C krusei, C parapsilosis,  C. tropicalis and the KPC resistance gene. (10-07 @ 15:07)      ASSESSMENT/PLAN  Patient is a 71yo F w/ RA, DM, HTN, fibromyalgia, hypothyroidism, brain aneurysm s/p repair BIBEMS for AMS brought to MICU for intubation for respiratory distress likely 2/2 multifocal PNA vs atelectasis vs bacteremia, found to be bacteremic with listeria and on meropenem, now extubated on RA.     #Neuro   - Off pressors  - mental status improving, able to secure own airway  - will consider LP once patient medically stable    #Cardiovascular  - HTN regimen: Labetalol 200 PO TID, lisinopril 20 qd  - did not require hydralazine pushes yesterday     #Pulmonary  Acute Hypoxemic Respiratory Failure  - likely 2/2 atelectasis demonstrated on CT chest  - extubated, breathing on RA    #FEN/GI  - Tolerated regular diet.  - transaminitis stable and resolving  - Abd x-ray showed nonspecific, nonobstructive gas pattern. Pt reports no BM since hospital arrival.  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 meropenem use  - Consulted hematology 10/11 regarding increasing pancytopenia per ID recs before considering transition to Bactrim    Normocytic anemia  - Stable  - f/u iron studies, B12, folate, retic  - pt can't take iron due to constipation    #ID  Blood cultures grew listeria, concern for meningitis   - c/w meropenem for now, pt gets hives from penicillin  - Repeat cultures neg on 10/9/2020. 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 Currently SCDs and fondaparinux 2.5 subQ     #Med Rec  - done    #Ethics  - FULL CODE    TO DO:  [] c/w abx, convert to PO  [] f/u heme c/s for pancytopenia  [] f/u pulm recs  [] f/u ID recs  [] may need addition antihypertensives when she leaves  [] restart PO prednisone 5 10/12 for RA MICU Transfer Note    Transfer from: MICU    Transfer to: ( x) Medicine    (  ) Telemetry     (   ) RCU        (    ) Palliative         (   ) Stroke Unit          (   ) __________________    Accepting physician: Dr. Jhonny Flores    HPI / INTERVAL HISTORY:  Patient is a 71 yo F with history of RA, DM, HTN, fibromyalgia, hypothyroidism, brain aneurysm s/p repair BIBEMS for shortness of breath and fever. Per EMS, patient has had 3 days of fever, Tmax 102.5, no known COVID exposures, took tylenol at unknown time before being brought in. Symptoms started on Monday. Per EMS, vitals on scene were 114/70, RA 68 improved NRB to 100%. She also had not taken her meds in 2 days. Patient c/o feeling fatigued, short of breath. Denies chest pain. Patient uses a wheelchair at baseline 2/2 arthritis.    Seen with resp distress, hypoxia and tachycardia in ED.  Placed on BiPAP.  Continued to be hypoxic w/ AMS.  Intubated for hypoxemic respiratory failure likely 2/2 atelectasis.    MICU Course:  Pt was intubated on 10/7. Pt initially required levophed in ED, but was weaned. Pt was started on vanco, ceftriaxone, and azithromycin. COVID and RVP were negative. Urine legionella was negative. Blood cultures grew listeria and meropenem was started. Other abx discontinued.  Pt was extubated on 10/9 at 5am and mental status improved. Pt passed bedside swallow and was tolerating PO with regular diet.    ASSESSMENT & PLAN:   Patient is a 71yo F w/ RA, DM, HTN, fibromyalgia, hypothyroidism, brain aneurysm s/p repair BIBEMS for AMS brought to MICU for intubation for respiratory distress likely 2/2 multifocal PNA vs atelectasis vs bacteremia, found to be bacteremic with listeria and on meropenem, now extubated on RA.     #Neuro   - Off pressors  - mental status improving, able to secure own airway  - will consider LP once patient medically stable    #Cardiovascular  - HTN regimen: Labetalol 200 PO TID, lisinopril 20 qd  - did not require hydralazine pushes yesterday     #Pulmonary  Acute Hypoxemic Respiratory Failure  - likely 2/2 atelectasis demonstrated on CT chest  - extubated, breathing on RA    #FEN/GI  - Tolerated regular diet.  - transaminitis stable and resolving  - Abd x-ray showed nonspecific, nonobstructive gas pattern. Pt reports no BM since hospital arrival.  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 meropenem use  - Consulted hematology 10/11 regarding increasing pancytopenia per ID recs before considering transition to Bactrim    Normocytic anemia  - Stable  - f/u iron studies, B12, folate, retic  - pt can't take iron due to constipation    #ID  Blood cultures grew listeria, concern for meningitis   - c/w meropenem for now, pt gets hives from penicillin  - Repeat cultures neg on 10/9/2020. 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 Currently SCDs and fondaparinux 2.5 subQ     #Med Rec  - done    #Ethics  - FULL CODE        For Followup:  [] c/w abx, convert to PO  [] f/u heme c/s for pancytopenia  [] f/u pulm recs  [] f/u ID recs  [] may need addition antihypertensives when she leaves  [] restart PO prednisone 5 10/12 for RA        Vital Signs Last 24 Hrs  T(C): 36.4 (11 Oct 2020 16:00), Max: 36.7 (10 Oct 2020 20:00)  T(F): 97.5 (11 Oct 2020 16:00), Max: 98.1 (10 Oct 2020 20:00)  HR: 73 (11 Oct 2020 17:00) (61 - 90)  BP: 140/73 (11 Oct 2020 17:00) (92/50 - 181/77)  BP(mean): 100 (11 Oct 2020 17:00) (66 - 112)  RR: 28 (11 Oct 2020 17:00) (14 - 36)  SpO2: 99% (11 Oct 2020 17:00) (98% - 100%)  I&O's Summary    10 Oct 2020 07:01  -  11 Oct 2020 07:00  --------------------------------------------------------  IN: 1805 mL / OUT: 850 mL / NET: 955 mL    11 Oct 2020 07:01  -  11 Oct 2020 17:47  --------------------------------------------------------  IN: 1258 mL / OUT: 700 mL / NET: 558 mL        MEDICATIONS  (STANDING):  artificial  tears Solution 1 Drop(s) Both EYES two times a day  cycloSPORINE (RESTASIS) 0.05% Emulsion 1 Drop(s) Both EYES two times a day  dextrose 5%. 1000 milliLiter(s) (50 mL/Hr) IV Continuous <Continuous>  dextrose 50% Injectable 12.5 Gram(s) IV Push once  dextrose 50% Injectable 25 Gram(s) IV Push once  dextrose 50% Injectable 25 Gram(s) IV Push once  fondaparinux Injectable 2.5 milliGRAM(s) SubCutaneous daily  insulin lispro (HumaLOG) corrective regimen sliding scale   SubCutaneous three times a day before meals  insulin lispro (HumaLOG) corrective regimen sliding scale   SubCutaneous at bedtime  labetalol 200 milliGRAM(s) Oral three times a day  levothyroxine Injectable 50 MICROGram(s) IV Push at bedtime  lisinopril 20 milliGRAM(s) Oral daily  meropenem  IVPB 2000 milliGRAM(s) IV Intermittent every 8 hours  polyethylene glycol 3350 17 Gram(s) Oral daily  prednisoLONE acetate 1% Suspension 1 Drop(s) Both EYES two times a day  senna 2 Tablet(s) Oral at bedtime    MEDICATIONS  (PRN):  dextrose 40% Gel 15 Gram(s) Oral once PRN Blood Glucose LESS THAN 70 milliGRAM(s)/deciliter  glucagon  Injectable 1 milliGRAM(s) IntraMuscular once PRN Glucose LESS THAN 70 milligrams/deciliter  hydrALAZINE Injectable 5 milliGRAM(s) IV Push every 6 hours PRN SBP >170  simethicone 80 milliGRAM(s) Chew three times a day PRN Gas        LABS                                            7.1                   Neurophils% (auto):   x      (10-11 @ 00:43):    2.28 )-----------(126          Lymphocytes% (auto):  x                                             23.5                   Eosinphils% (auto):   x        Manual%: Neutrophils x    ; Lymphocytes x    ; Eosinophils x    ; Bands%: x    ; Blasts x                                    130    |  98     |  7                   Calcium: 8.3   / iCa: x      (10-11 @ 00:43)    ----------------------------<  236       Magnesium: 1.8                              3.7     |  22     |  <0.30            Phosphorous: 2.0      TPro  5.5    /  Alb  2.8    /  TBili  0.3    /  DBili  x      /  AST  14     /  ALT  30     /  AlkPhos  41     11 Oct 2020 00:43    ( 10-11 @ 00:42 )   PT: 14.5 sec;   INR: 1.22 ratio  aPTT: 27.9 sec

## 2020-10-09 NOTE — DIETITIAN INITIAL EVALUATION ADULT. - PROBLEM/PLAN-4
Nursing Note by Cyn Keenan CMA at 03/20/18 09:47 AM     Author:  Cyn Keenan CMA Service:  (none) Author Type:  Certified Medical Assistant     Filed:  03/20/18 09:47 AM Encounter Date:  3/20/2018 Status:  Signed     :  Cyn Keenan CMA (Certified Medical Assistant)            If provider orders tests at today's visit, patient would like to be contacted via [employer].  If pt is to be contacted by phone, patient's preferred phone # is [phone] (cell)  and it is ok to leave a detailed message on voice mail or with family member.  If medications are ordered at today's visit, the pharmacy name/location patient would like them to be sent to is [facility] [place] [address]  .[LR1.1T]         Revision History        User Key Date/Time User Provider Type Action    > LR1.1 03/20/18 09:47 AM Cyn Keenan CMA Certified Medical Assistant Sign    T - Template            
DISPLAY PLAN FREE TEXT

## 2020-10-09 NOTE — PROGRESS NOTE ADULT - SUBJECTIVE AND OBJECTIVE BOX
Patient is a 70y old  Female who presents with a chief complaint of SOB (09 Oct 2020 12:09)      SUBJECTIVE / OVERNIGHT EVENTS:  Seen in MICU  Off ventilator  No need for pressors, BP is high  Review of Systems:   Pt is intubated    MEDICATIONS  (STANDING):  artificial  tears Solution 1 Drop(s) Both EYES two times a day  cycloSPORINE (RESTASIS) 0.05% Emulsion 1 Drop(s) Both EYES two times a day  dextrose 5%. 1000 milliLiter(s) (50 mL/Hr) IV Continuous <Continuous>  dextrose 50% Injectable 12.5 Gram(s) IV Push once  dextrose 50% Injectable 25 Gram(s) IV Push once  dextrose 50% Injectable 25 Gram(s) IV Push once  insulin lispro (HumaLOG) corrective regimen sliding scale   SubCutaneous every 6 hours  levothyroxine Injectable 50 MICROGram(s) IV Push at bedtime  lisinopril 20 milliGRAM(s) Oral daily  meropenem  IVPB 2000 milliGRAM(s) IV Intermittent every 8 hours  methylPREDNISolone sodium succinate Injectable 8 milliGRAM(s) IV Push daily  potassium phosphate / sodium phosphate Powder (PHOS-NaK) 1 Packet(s) Oral two times a day before meals  prednisoLONE acetate 1% Suspension 1 Drop(s) Both EYES two times a day    MEDICATIONS  (PRN):  dextrose 40% Gel 15 Gram(s) Oral once PRN Blood Glucose LESS THAN 70 milliGRAM(s)/deciliter  glucagon  Injectable 1 milliGRAM(s) IntraMuscular once PRN Glucose LESS THAN 70 milligrams/deciliter  hydrALAZINE Injectable 5 milliGRAM(s) IV Push every 6 hours PRN SBP >170      PHYSICAL EXAM:  Vital Signs Last 24 Hrs  T(C): 36.6 (09 Oct 2020 20:00), Max: 37 (09 Oct 2020 04:00)  T(F): 97.9 (09 Oct 2020 20:00), Max: 98.6 (09 Oct 2020 04:00)  HR: 88 (09 Oct 2020 22:00) (69 - 124)  BP: 156/71 (09 Oct 2020 22:00) (116/58 - 210/99)  BP(mean): 102 (09 Oct 2020 22:00) (79 - 142)  RR: 16 (09 Oct 2020 22:00) (15 - 46)  SpO2: 98% (09 Oct 2020 22:00) (93% - 100%)  I&O's Summary    08 Oct 2020 07:01  -  09 Oct 2020 07:00  --------------------------------------------------------  IN: 491.4 mL / OUT: 640 mL / NET: -148.6 mL    09 Oct 2020 07:01  -  09 Oct 2020 22:55  --------------------------------------------------------  IN: 2670 mL / OUT: 300 mL / NET: 2370 mL      GENERAL:On a vent  HEAD:  Atraumatic, Normocephalic  EYES: EOMI, PERRLA, conjunctiva and sclera clear  NECK: Supple, No JVD  CHEST/LUNG: Clear to auscultation bilaterally; No wheeze  HEART: Regular rate and rhythm; No murmurs, rubs, or gallops  ABDOMEN: Soft, Nontender, Nondistended; Bowel sounds present  EXTREMITIES:  2+ Peripheral Pulses, No clubbing, cyanosis, or edema  PSYCH: AAOx3  NEUROLOGY: non-focal  SKIN: No rashes or lesions    LABS:  CAPILLARY BLOOD GLUCOSE      POCT Blood Glucose.: 239 mg/dL (09 Oct 2020 18:44)  POCT Blood Glucose.: 104 mg/dL (09 Oct 2020 05:11)  POCT Blood Glucose.: 151 mg/dL (08 Oct 2020 23:13)                          8.1    5.73  )-----------( 140      ( 09 Oct 2020 12:44 )             25.6     10-09    131<L>  |  101  |  10  ----------------------------<  96  3.6   |  16<L>  |  <0.30<L>    Ca    8.8      09 Oct 2020 12:44  Phos  2.1     10-09  Mg     1.6     10-09    TPro  6.0  /  Alb  2.9<L>  /  TBili  0.4  /  DBili  x   /  AST  38  /  ALT  57<H>  /  AlkPhos  41  10-09    PT/INR - ( 09 Oct 2020 00:21 )   PT: 13.6 sec;   INR: 1.14 ratio         PTT - ( 09 Oct 2020 00:21 )  PTT:30.2 sec          RADIOLOGY & ADDITIONAL TESTS:    Imaging Personally Reviewed:    Consultant(s) Notes Reviewed:      Care Discussed with Consultants/Other Providers:

## 2020-10-10 LAB
ALBUMIN SERPL ELPH-MCNC: 2.8 G/DL — LOW (ref 3.3–5)
ALP SERPL-CCNC: 38 U/L — LOW (ref 40–120)
ALT FLD-CCNC: 40 U/L — SIGNIFICANT CHANGE UP (ref 10–45)
ANION GAP SERPL CALC-SCNC: 11 MMOL/L — SIGNIFICANT CHANGE UP (ref 5–17)
ANION GAP SERPL CALC-SCNC: 12 MMOL/L — SIGNIFICANT CHANGE UP (ref 5–17)
APTT BLD: 28.9 SEC — SIGNIFICANT CHANGE UP (ref 27.5–35.5)
AST SERPL-CCNC: 19 U/L — SIGNIFICANT CHANGE UP (ref 10–40)
BILIRUB SERPL-MCNC: 0.4 MG/DL — SIGNIFICANT CHANGE UP (ref 0.2–1.2)
BUN SERPL-MCNC: 8 MG/DL — SIGNIFICANT CHANGE UP (ref 7–23)
BUN SERPL-MCNC: 8 MG/DL — SIGNIFICANT CHANGE UP (ref 7–23)
CALCIUM SERPL-MCNC: 8.4 MG/DL — SIGNIFICANT CHANGE UP (ref 8.4–10.5)
CALCIUM SERPL-MCNC: 9 MG/DL — SIGNIFICANT CHANGE UP (ref 8.4–10.5)
CHLORIDE SERPL-SCNC: 101 MMOL/L — SIGNIFICANT CHANGE UP (ref 96–108)
CHLORIDE SERPL-SCNC: 95 MMOL/L — LOW (ref 96–108)
CK MB BLD-MCNC: 1.8 % — SIGNIFICANT CHANGE UP (ref 0–3.5)
CK MB CFR SERPL CALC: 2.6 NG/ML — SIGNIFICANT CHANGE UP (ref 0–3.8)
CK SERPL-CCNC: 144 U/L — SIGNIFICANT CHANGE UP (ref 25–170)
CO2 SERPL-SCNC: 19 MMOL/L — LOW (ref 22–31)
CO2 SERPL-SCNC: 22 MMOL/L — SIGNIFICANT CHANGE UP (ref 22–31)
CREAT SERPL-MCNC: <0.3 MG/DL — LOW (ref 0.5–1.3)
CREAT SERPL-MCNC: <0.3 MG/DL — LOW (ref 0.5–1.3)
CULTURE RESULTS: SIGNIFICANT CHANGE UP
CULTURE RESULTS: SIGNIFICANT CHANGE UP
GLUCOSE BLDC GLUCOMTR-MCNC: 184 MG/DL — HIGH (ref 70–99)
GLUCOSE BLDC GLUCOMTR-MCNC: 217 MG/DL — HIGH (ref 70–99)
GLUCOSE BLDC GLUCOMTR-MCNC: 267 MG/DL — HIGH (ref 70–99)
GLUCOSE SERPL-MCNC: 211 MG/DL — HIGH (ref 70–99)
GLUCOSE SERPL-MCNC: 234 MG/DL — HIGH (ref 70–99)
HCT VFR BLD CALC: 25.5 % — LOW (ref 34.5–45)
HGB BLD-MCNC: 7.9 G/DL — LOW (ref 11.5–15.5)
INR BLD: 1.11 RATIO — SIGNIFICANT CHANGE UP (ref 0.88–1.16)
MAGNESIUM SERPL-MCNC: 1.5 MG/DL — LOW (ref 1.6–2.6)
MAGNESIUM SERPL-MCNC: 1.9 MG/DL — SIGNIFICANT CHANGE UP (ref 1.6–2.6)
MCHC RBC-ENTMCNC: 24.7 PG — LOW (ref 27–34)
MCHC RBC-ENTMCNC: 31 GM/DL — LOW (ref 32–36)
MCV RBC AUTO: 79.7 FL — LOW (ref 80–100)
NRBC # BLD: 0 /100 WBCS — SIGNIFICANT CHANGE UP (ref 0–0)
ORGANISM # SPEC MICROSCOPIC CNT: SIGNIFICANT CHANGE UP
ORGANISM # SPEC MICROSCOPIC CNT: SIGNIFICANT CHANGE UP
PHOSPHATE SERPL-MCNC: 2.5 MG/DL — SIGNIFICANT CHANGE UP (ref 2.5–4.5)
PHOSPHATE SERPL-MCNC: 3 MG/DL — SIGNIFICANT CHANGE UP (ref 2.5–4.5)
PLATELET # BLD AUTO: 133 K/UL — LOW (ref 150–400)
POTASSIUM SERPL-MCNC: 3.8 MMOL/L — SIGNIFICANT CHANGE UP (ref 3.5–5.3)
POTASSIUM SERPL-MCNC: 5.1 MMOL/L — SIGNIFICANT CHANGE UP (ref 3.5–5.3)
POTASSIUM SERPL-SCNC: 3.8 MMOL/L — SIGNIFICANT CHANGE UP (ref 3.5–5.3)
POTASSIUM SERPL-SCNC: 5.1 MMOL/L — SIGNIFICANT CHANGE UP (ref 3.5–5.3)
PROT SERPL-MCNC: 5.6 G/DL — LOW (ref 6–8.3)
PROTHROM AB SERPL-ACNC: 13.3 SEC — SIGNIFICANT CHANGE UP (ref 10.6–13.6)
RBC # BLD: 3.2 M/UL — LOW (ref 3.8–5.2)
RBC # FLD: 16.4 % — HIGH (ref 10.3–14.5)
SODIUM SERPL-SCNC: 128 MMOL/L — LOW (ref 135–145)
SODIUM SERPL-SCNC: 132 MMOL/L — LOW (ref 135–145)
SPECIMEN SOURCE: SIGNIFICANT CHANGE UP
SPECIMEN SOURCE: SIGNIFICANT CHANGE UP
TROPONIN T, HIGH SENSITIVITY RESULT: 25 NG/L — SIGNIFICANT CHANGE UP (ref 0–51)
WBC # BLD: 2.95 K/UL — LOW (ref 3.8–10.5)
WBC # FLD AUTO: 2.95 K/UL — LOW (ref 3.8–10.5)

## 2020-10-10 PROCEDURE — 93010 ELECTROCARDIOGRAM REPORT: CPT

## 2020-10-10 PROCEDURE — 99291 CRITICAL CARE FIRST HOUR: CPT

## 2020-10-10 PROCEDURE — 74018 RADEX ABDOMEN 1 VIEW: CPT | Mod: 26

## 2020-10-10 RX ORDER — FONDAPARINUX SODIUM 2.5 MG/.5ML
2.5 INJECTION, SOLUTION SUBCUTANEOUS DAILY
Refills: 0 | Status: DISCONTINUED | OUTPATIENT
Start: 2020-10-10 | End: 2020-10-23

## 2020-10-10 RX ORDER — BENZOCAINE AND MENTHOL 5; 1 G/100ML; G/100ML
1 LIQUID ORAL ONCE
Refills: 0 | Status: COMPLETED | OUTPATIENT
Start: 2020-10-10 | End: 2020-10-10

## 2020-10-10 RX ORDER — LABETALOL HCL 100 MG
10 TABLET ORAL ONCE
Refills: 0 | Status: COMPLETED | OUTPATIENT
Start: 2020-10-10 | End: 2020-10-10

## 2020-10-10 RX ORDER — LABETALOL HCL 100 MG
200 TABLET ORAL THREE TIMES A DAY
Refills: 0 | Status: DISCONTINUED | OUTPATIENT
Start: 2020-10-10 | End: 2020-10-14

## 2020-10-10 RX ORDER — ENOXAPARIN SODIUM 100 MG/ML
40 INJECTION SUBCUTANEOUS AT BEDTIME
Refills: 0 | Status: DISCONTINUED | OUTPATIENT
Start: 2020-10-10 | End: 2020-10-10

## 2020-10-10 RX ORDER — SIMETHICONE 80 MG/1
80 TABLET, CHEWABLE ORAL THREE TIMES A DAY
Refills: 0 | Status: DISCONTINUED | OUTPATIENT
Start: 2020-10-10 | End: 2020-10-23

## 2020-10-10 RX ORDER — POLYETHYLENE GLYCOL 3350 17 G/17G
17 POWDER, FOR SOLUTION ORAL DAILY
Refills: 0 | Status: DISCONTINUED | OUTPATIENT
Start: 2020-10-10 | End: 2020-10-10

## 2020-10-10 RX ORDER — MAGNESIUM SULFATE 500 MG/ML
2 VIAL (ML) INJECTION ONCE
Refills: 0 | Status: COMPLETED | OUTPATIENT
Start: 2020-10-10 | End: 2020-10-10

## 2020-10-10 RX ADMIN — CYCLOSPORINE 1 DROP(S): 0.5 EMULSION OPHTHALMIC at 17:30

## 2020-10-10 RX ADMIN — Medication 3: at 14:03

## 2020-10-10 RX ADMIN — Medication 1 DROP(S): at 05:37

## 2020-10-10 RX ADMIN — BENZOCAINE AND MENTHOL 1 LOZENGE: 5; 1 LIQUID ORAL at 08:34

## 2020-10-10 RX ADMIN — Medication 5 MILLIGRAM(S): at 03:10

## 2020-10-10 RX ADMIN — FONDAPARINUX SODIUM 2.5 MILLIGRAM(S): 2.5 INJECTION, SOLUTION SUBCUTANEOUS at 17:30

## 2020-10-10 RX ADMIN — Medication 5 MILLIGRAM(S): at 08:34

## 2020-10-10 RX ADMIN — LISINOPRIL 20 MILLIGRAM(S): 2.5 TABLET ORAL at 05:36

## 2020-10-10 RX ADMIN — SIMETHICONE 80 MILLIGRAM(S): 80 TABLET, CHEWABLE ORAL at 16:39

## 2020-10-10 RX ADMIN — Medication 200 MILLIGRAM(S): at 11:34

## 2020-10-10 RX ADMIN — Medication 1: at 05:48

## 2020-10-10 RX ADMIN — BENZOCAINE AND MENTHOL 1 LOZENGE: 5; 1 LIQUID ORAL at 16:20

## 2020-10-10 RX ADMIN — Medication 10 MILLIGRAM(S): at 06:16

## 2020-10-10 RX ADMIN — Medication 1 DROP(S): at 17:29

## 2020-10-10 RX ADMIN — CYCLOSPORINE 1 DROP(S): 0.5 EMULSION OPHTHALMIC at 05:37

## 2020-10-10 RX ADMIN — MEROPENEM 200 MILLIGRAM(S): 1 INJECTION INTRAVENOUS at 22:24

## 2020-10-10 RX ADMIN — Medication 2: at 02:03

## 2020-10-10 RX ADMIN — Medication 2: at 18:22

## 2020-10-10 RX ADMIN — MEROPENEM 200 MILLIGRAM(S): 1 INJECTION INTRAVENOUS at 05:36

## 2020-10-10 RX ADMIN — Medication 1 DROP(S): at 17:30

## 2020-10-10 RX ADMIN — Medication 200 MILLIGRAM(S): at 19:29

## 2020-10-10 RX ADMIN — Medication 50 GRAM(S): at 02:08

## 2020-10-10 RX ADMIN — Medication 50 MICROGRAM(S): at 22:24

## 2020-10-10 RX ADMIN — Medication 1 PACKET(S): at 08:34

## 2020-10-10 RX ADMIN — Medication 8 MILLIGRAM(S): at 05:36

## 2020-10-10 RX ADMIN — MEROPENEM 200 MILLIGRAM(S): 1 INJECTION INTRAVENOUS at 14:04

## 2020-10-10 NOTE — PROGRESS NOTE ADULT - SUBJECTIVE AND OBJECTIVE BOX
PROGRESS NOTE:     CONTACT INFO:  Jose Licona MD PGY-1  Internal Medicine  Please call MICU for questions    Patient is a 70y old  Female who presents with a chief complaint of SOB (10 Oct 2020 06:51)    SUBJECTIVE / OVERNIGHT EVENTS: Patient seen and evaluated at bedside. Otherwise, denies  fever, chills, nausea, vomiting, dizziness, lightheadedness, SOB at rest, chest pain, palpitations, abdominal pain, focal weakness or sensory changes.    Events: Patient continues to be hypertensive overnight requiring PRN hydralazine with addnl labetalol pushes.       ADDITIONAL REVIEW OF SYSTEMS:    MEDICATIONS  (STANDING):  artificial  tears Solution 1 Drop(s) Both EYES two times a day  cycloSPORINE (RESTASIS) 0.05% Emulsion 1 Drop(s) Both EYES two times a day  dextrose 5%. 1000 milliLiter(s) (50 mL/Hr) IV Continuous <Continuous>  dextrose 50% Injectable 12.5 Gram(s) IV Push once  dextrose 50% Injectable 25 Gram(s) IV Push once  dextrose 50% Injectable 25 Gram(s) IV Push once  insulin lispro (HumaLOG) corrective regimen sliding scale   SubCutaneous every 6 hours  levothyroxine Injectable 50 MICROGram(s) IV Push at bedtime  lisinopril 20 milliGRAM(s) Oral daily  meropenem  IVPB 2000 milliGRAM(s) IV Intermittent every 8 hours  methylPREDNISolone sodium succinate Injectable 8 milliGRAM(s) IV Push daily  potassium phosphate / sodium phosphate Powder (PHOS-NaK) 1 Packet(s) Oral two times a day before meals  prednisoLONE acetate 1% Suspension 1 Drop(s) Both EYES two times a day    MEDICATIONS  (PRN):  dextrose 40% Gel 15 Gram(s) Oral once PRN Blood Glucose LESS THAN 70 milliGRAM(s)/deciliter  glucagon  Injectable 1 milliGRAM(s) IntraMuscular once PRN Glucose LESS THAN 70 milligrams/deciliter  hydrALAZINE Injectable 5 milliGRAM(s) IV Push every 6 hours PRN SBP >170      CAPILLARY BLOOD GLUCOSE      POCT Blood Glucose.: 184 mg/dL (10 Oct 2020 05:39)  POCT Blood Glucose.: 217 mg/dL (10 Oct 2020 02:03)  POCT Blood Glucose.: 239 mg/dL (09 Oct 2020 18:44)    I&O's Summary    09 Oct 2020 07:01  -  10 Oct 2020 07:00  --------------------------------------------------------  IN: 2920 mL / OUT: 750 mL / NET: 2170 mL        PHYSICAL EXAM:  Vital Signs Last 24 Hrs  T(C): 36.6 (10 Oct 2020 04:00), Max: 36.6 (09 Oct 2020 16:00)  T(F): 97.9 (10 Oct 2020 04:00), Max: 97.9 (09 Oct 2020 20:00)  HR: 75 (10 Oct 2020 06:37) (75 - 124)  BP: 131/62 (10 Oct 2020 06:37) (116/58 - 210/99)  BP(mean): 89 (10 Oct 2020 06:37) (79 - 142)  RR: 36 (10 Oct 2020 06:37) (16 - 46)  SpO2: 97% (10 Oct 2020 06:37) (93% - 100%)    PHYSICAL EXAM:  General: very petite woman appears stated age, covered in heat packs and blankets.  HEENT: PERRLA, Pt is slow to open eyes and mouth. Sclera anicteric.   Neck: soft, no LAD, reports tender to palpation in the back and unable to put chin to chest due to pain. Can move head side to side without difficulty.  Chest/Lungs: looks comfortable over all, but +belly breathing  Heart: +tachycardic, +left parasternal systolic murmur, regular rhythm  Abdomen: soft, non-tender, normal BS  Extremities: extremities warm and well perfused, no edema noted  Skin: no rashes or lesions  Neuro: Lethargic, not responding to questions  Psych: not assessed    LABS:                        7.9    2.95  )-----------( 133      ( 10 Oct 2020 00:53 )             25.5     10-10    132<L>  |  101  |  8   ----------------------------<  234<H>  3.8   |  19<L>  |  <0.30<L>    Ca    9.0      10 Oct 2020 00:53  Phos  3.0     10-10  Mg     1.5     10-10    TPro  5.6<L>  /  Alb  2.8<L>  /  TBili  0.4  /  DBili  x   /  AST  19  /  ALT  40  /  AlkPhos  38<L>  10-10    PT/INR - ( 10 Oct 2020 00:53 )   PT: 13.3 sec;   INR: 1.11 ratio         PTT - ( 10 Oct 2020 00:53 )  PTT:28.9 sec          Culture - Blood (collected 09 Oct 2020 02:19)  Source: .Blood Blood-Venous  Preliminary Report (10 Oct 2020 03:01):    No growth to date.    Culture - Blood (collected 09 Oct 2020 02:19)  Source: .Blood Blood-Peripheral  Preliminary Report (10 Oct 2020 03:01):    No growth to date.    Culture - Sputum (collected 08 Oct 2020 12:31)  Source: .Sputum Sputum trap  Gram Stain (08 Oct 2020 22:59):    Rare polymorphonuclear leukocytes per low power field    No squamous epithelial cells per low power field    Rare Gram positive cocci in pairs per oil power field  Preliminary Report (09 Oct 2020 17:23):    Normal Respiratory Shanda present    Culture - Urine (collected 07 Oct 2020 15:26)  Source: .Urine Clean Catch (Midstream)  Final Report (09 Oct 2020 08:38):    >=3 organisms. Probable collection contamination.    Culture - Blood (collected 07 Oct 2020 15:07)  Source: .Blood Blood-Peripheral  Gram Stain (08 Oct 2020 13:24):    Growth in anaerobic bottle: Gram Positive Rods    Growth in aerobic bottle: Gram Positive Rods  Preliminary Report (09 Oct 2020 11:22):    Listeria monocytogenes    Resistance to ampicillin or penicillin for Listeria    monocytogenes has not been described. L. monocytogenes is intrinsically    resistant to cephalosporins.    "Due to technical problems, Proteus sp. will Not be reported as partof    the BCID panel until further notice"    ***Blood Panel PCR results on this specimen are available    approximately 3 hours after the Gram stain result.***    Gram stain, PCR, and/or culture results may not always    correspond due to difference in methodologies.    ************************************************************    This PCR assay was performed using [employer].    The following targets are tested for: Enterococcus,    vancomycin resistant enterococci, Listeria monocytogenes,    coagulase negative staphylococci, S. aureus,    methicillin resistant S. aureus, Streptococcus agalactiae    (Group B), S. pneumoniae, S. pyogenes (Group A),    Acinetobacter baumannii, Enterobacter cloacae, E. coli,    Klebsiella oxytoca, K. pneumoniae, Proteus sp.,    Serratia marcescens, Haemophilus influenzae,    Neisseria meningitidis, Pseudomonas aeruginosa, Candida    albicans, C. glabrata, C krusei, C parapsilosis,    C. tropicalis and the KPC resistance gene.  Organism: Blood Culture PCR (08 Oct 2020 15:08)  Organism: Blood Culture PCR (08 Oct 2020 15:08)    Culture - Blood (collected 07 Oct 2020 15:07)  Source: .Blood Blood-Peripheral  Gram Stain (08 Oct 2020 21:16):    Growth in anaerobic bottle: Gram Positive Rods    Growth in aerobic bottle: Gram Positive Rods  Final Report (09 Oct 2020 11:03):    Growth in aerobic and anaerobic bottles: Listeria monocytogenes    Resistance to ampicillin or penicillin for Listeria    monocytogenes has not been described. L. monocytogenes is intrinsically    resistant to cephalosporins.        RADIOLOGY & ADDITIONAL TESTS:       PROGRESS NOTE:     CONTACT INFO:  Jose Licona MD PGY-1  Internal Medicine  Please call MICU for questions    Patient is a 70y old  Female who presents with a chief complaint of SOB (10 Oct 2020 06:51)    SUBJECTIVE / OVERNIGHT EVENTS: Patient seen and evaluated at bedside. States she has some abdominal pain. Tolerated breakfast fine today. Has generalized pain likely from Otherwise, denies  fever, chills, nausea, vomiting, dizziness, lightheadedness, SOB at rest, chest pain, palpitations, abdominal pain, focal weakness or sensory changes.    Events: Patient continues to be hypertensive overnight requiring PRN hydralazine with addnl labetalol pushes.       ADDITIONAL REVIEW OF SYSTEMS:    MEDICATIONS  (STANDING):  artificial  tears Solution 1 Drop(s) Both EYES two times a day  cycloSPORINE (RESTASIS) 0.05% Emulsion 1 Drop(s) Both EYES two times a day  dextrose 5%. 1000 milliLiter(s) (50 mL/Hr) IV Continuous <Continuous>  dextrose 50% Injectable 12.5 Gram(s) IV Push once  dextrose 50% Injectable 25 Gram(s) IV Push once  dextrose 50% Injectable 25 Gram(s) IV Push once  insulin lispro (HumaLOG) corrective regimen sliding scale   SubCutaneous every 6 hours  levothyroxine Injectable 50 MICROGram(s) IV Push at bedtime  lisinopril 20 milliGRAM(s) Oral daily  meropenem  IVPB 2000 milliGRAM(s) IV Intermittent every 8 hours  methylPREDNISolone sodium succinate Injectable 8 milliGRAM(s) IV Push daily  potassium phosphate / sodium phosphate Powder (PHOS-NaK) 1 Packet(s) Oral two times a day before meals  prednisoLONE acetate 1% Suspension 1 Drop(s) Both EYES two times a day    MEDICATIONS  (PRN):  dextrose 40% Gel 15 Gram(s) Oral once PRN Blood Glucose LESS THAN 70 milliGRAM(s)/deciliter  glucagon  Injectable 1 milliGRAM(s) IntraMuscular once PRN Glucose LESS THAN 70 milligrams/deciliter  hydrALAZINE Injectable 5 milliGRAM(s) IV Push every 6 hours PRN SBP >170      CAPILLARY BLOOD GLUCOSE      POCT Blood Glucose.: 184 mg/dL (10 Oct 2020 05:39)  POCT Blood Glucose.: 217 mg/dL (10 Oct 2020 02:03)  POCT Blood Glucose.: 239 mg/dL (09 Oct 2020 18:44)    I&O's Summary    09 Oct 2020 07:01  -  10 Oct 2020 07:00  --------------------------------------------------------  IN: 2920 mL / OUT: 750 mL / NET: 2170 mL        PHYSICAL EXAM:  Vital Signs Last 24 Hrs  T(C): 36.6 (10 Oct 2020 04:00), Max: 36.6 (09 Oct 2020 16:00)  T(F): 97.9 (10 Oct 2020 04:00), Max: 97.9 (09 Oct 2020 20:00)  HR: 75 (10 Oct 2020 06:37) (75 - 124)  BP: 131/62 (10 Oct 2020 06:37) (116/58 - 210/99)  BP(mean): 89 (10 Oct 2020 06:37) (79 - 142)  RR: 36 (10 Oct 2020 06:37) (16 - 46)  SpO2: 97% (10 Oct 2020 06:37) (93% - 100%)    PHYSICAL EXAM:  General: very petite woman appears stated age, covered in heat packs and blankets.  HEENT: PERRLA, Pt is slow to open eyes and mouth. Sclera anicteric.   Neck: soft, no LAD, reports tender to palpation in the back and unable to put chin to chest due to pain. Can move head side to side without difficulty.  Chest/Lungs: looks comfortable over all, but +belly breathing  Heart: +tachycardic, +left parasternal systolic murmur, regular rhythm  Abdomen: soft, non-tender, normal BS  Extremities: extremities warm and well perfused, no edema noted  Skin: no rashes or lesions  Neuro: Lethargic, not responding to questions  Psych: not assessed    LABS:                        7.9    2.95  )-----------( 133      ( 10 Oct 2020 00:53 )             25.5     10-10    132<L>  |  101  |  8   ----------------------------<  234<H>  3.8   |  19<L>  |  <0.30<L>    Ca    9.0      10 Oct 2020 00:53  Phos  3.0     10-10  Mg     1.5     10-10    TPro  5.6<L>  /  Alb  2.8<L>  /  TBili  0.4  /  DBili  x   /  AST  19  /  ALT  40  /  AlkPhos  38<L>  10-10    PT/INR - ( 10 Oct 2020 00:53 )   PT: 13.3 sec;   INR: 1.11 ratio         PTT - ( 10 Oct 2020 00:53 )  PTT:28.9 sec          Culture - Blood (collected 09 Oct 2020 02:19)  Source: .Blood Blood-Venous  Preliminary Report (10 Oct 2020 03:01):    No growth to date.    Culture - Blood (collected 09 Oct 2020 02:19)  Source: .Blood Blood-Peripheral  Preliminary Report (10 Oct 2020 03:01):    No growth to date.    Culture - Sputum (collected 08 Oct 2020 12:31)  Source: .Sputum Sputum trap  Gram Stain (08 Oct 2020 22:59):    Rare polymorphonuclear leukocytes per low power field    No squamous epithelial cells per low power field    Rare Gram positive cocci in pairs per oil power field  Preliminary Report (09 Oct 2020 17:23):    Normal Respiratory Shanda present    Culture - Urine (collected 07 Oct 2020 15:26)  Source: .Urine Clean Catch (Midstream)  Final Report (09 Oct 2020 08:38):    >=3 organisms. Probable collection contamination.    Culture - Blood (collected 07 Oct 2020 15:07)  Source: .Blood Blood-Peripheral  Gram Stain (08 Oct 2020 13:24):    Growth in anaerobic bottle: Gram Positive Rods    Growth in aerobic bottle: Gram Positive Rods  Preliminary Report (09 Oct 2020 11:22):    Listeria monocytogenes    Resistance to ampicillin or penicillin for Listeria    monocytogenes has not been described. L. monocytogenes is intrinsically    resistant to cephalosporins.    "Due to technical problems, Proteus sp. will Not be reported as partof    the BCID panel until further notice"    ***Blood Panel PCR results on this specimen are available    approximately 3 hours after the Gram stain result.***    Gram stain, PCR, and/or culture results may not always    correspond due to difference in methodologies.    ************************************************************    This PCR assay was performed using [employer].    The following targets are tested for: Enterococcus,    vancomycin resistant enterococci, Listeria monocytogenes,    coagulase negative staphylococci, S. aureus,    methicillin resistant S. aureus, Streptococcus agalactiae    (Group B), S. pneumoniae, S. pyogenes (Group A),    Acinetobacter baumannii, Enterobacter cloacae, E. coli,    Klebsiella oxytoca, K. pneumoniae, Proteus sp.,    Serratia marcescens, Haemophilus influenzae,    Neisseria meningitidis, Pseudomonas aeruginosa, Candida    albicans, C. glabrata, C krusei, C parapsilosis,    C. tropicalis and the KPC resistance gene.  Organism: Blood Culture PCR (08 Oct 2020 15:08)  Organism: Blood Culture PCR (08 Oct 2020 15:08)    Culture - Blood (collected 07 Oct 2020 15:07)  Source: .Blood Blood-Peripheral  Gram Stain (08 Oct 2020 21:16):    Growth in anaerobic bottle: Gram Positive Rods    Growth in aerobic bottle: Gram Positive Rods  Final Report (09 Oct 2020 11:03):    Growth in aerobic and anaerobic bottles: Listeria monocytogenes    Resistance to ampicillin or penicillin for Listeria    monocytogenes has not been described. L. monocytogenes is intrinsically    resistant to cephalosporins.        RADIOLOGY & ADDITIONAL TESTS:       PROGRESS NOTE:     CONTACT INFO:  Jose Higuera MD PGY-1  Internal Medicine  Please call MICU for questions    Patient is a 70y old  Female who presents with a chief complaint of SOB (10 Oct 2020 06:51)    SUBJECTIVE / OVERNIGHT EVENTS: Patient seen and evaluated at bedside. States she has some abdominal pain. Tolerated breakfast fine today. Has generalized pain likely from RA. No acute changes in focal weakness or sensory changes. Otherwise, denies  fever, chills, nausea, vomiting, dizziness, lightheadedness, SOB, chest pain, palpitations, abdominal pain, focal weakness or sensory changes.     Events: Patient continues to be hypertensive overnight requiring PRN hydralazine with addnl labetalol pushes.     ADDITIONAL REVIEW OF SYSTEMS:    MEDICATIONS  (STANDING):  artificial  tears Solution 1 Drop(s) Both EYES two times a day  cycloSPORINE (RESTASIS) 0.05% Emulsion 1 Drop(s) Both EYES two times a day  dextrose 5%. 1000 milliLiter(s) (50 mL/Hr) IV Continuous <Continuous>  dextrose 50% Injectable 12.5 Gram(s) IV Push once  dextrose 50% Injectable 25 Gram(s) IV Push once  dextrose 50% Injectable 25 Gram(s) IV Push once  insulin lispro (HumaLOG) corrective regimen sliding scale   SubCutaneous every 6 hours  levothyroxine Injectable 50 MICROGram(s) IV Push at bedtime  lisinopril 20 milliGRAM(s) Oral daily  meropenem  IVPB 2000 milliGRAM(s) IV Intermittent every 8 hours  methylPREDNISolone sodium succinate Injectable 8 milliGRAM(s) IV Push daily  potassium phosphate / sodium phosphate Powder (PHOS-NaK) 1 Packet(s) Oral two times a day before meals  prednisoLONE acetate 1% Suspension 1 Drop(s) Both EYES two times a day    MEDICATIONS  (PRN):  dextrose 40% Gel 15 Gram(s) Oral once PRN Blood Glucose LESS THAN 70 milliGRAM(s)/deciliter  glucagon  Injectable 1 milliGRAM(s) IntraMuscular once PRN Glucose LESS THAN 70 milligrams/deciliter  hydrALAZINE Injectable 5 milliGRAM(s) IV Push every 6 hours PRN SBP >170      CAPILLARY BLOOD GLUCOSE      POCT Blood Glucose.: 184 mg/dL (10 Oct 2020 05:39)  POCT Blood Glucose.: 217 mg/dL (10 Oct 2020 02:03)  POCT Blood Glucose.: 239 mg/dL (09 Oct 2020 18:44)    I&O's Summary    09 Oct 2020 07:01  -  10 Oct 2020 07:00  --------------------------------------------------------  IN: 2920 mL / OUT: 750 mL / NET: 2170 mL        PHYSICAL EXAM:  Vital Signs Last 24 Hrs  T(C): 36.6 (10 Oct 2020 04:00), Max: 36.6 (09 Oct 2020 16:00)  T(F): 97.9 (10 Oct 2020 04:00), Max: 97.9 (09 Oct 2020 20:00)  HR: 75 (10 Oct 2020 06:37) (75 - 124)  BP: 131/62 (10 Oct 2020 06:37) (116/58 - 210/99)  BP(mean): 89 (10 Oct 2020 06:37) (79 - 142)  RR: 36 (10 Oct 2020 06:37) (16 - 46)  SpO2: 97% (10 Oct 2020 06:37) (93% - 100%)    PHYSICAL EXAM:  General: very petite woman appears stated age, covered in heat packs and blankets. More responsive than yesterday  HEENT: Sclera anicteric.   Neck: soft,   Chest/Lungs: looks comfortable over all, but +belly breathing  Heart: +tachycardic, +left parasternal systolic murmur, regular rhythm  Abdomen: soft, non-tender, normal BS  Extremities: extremities warm and well perfused, no edema noted  Skin: no rashes or lesions  Neuro: More alert, responding to questions   Psych: not assessed    LABS:                        7.9    2.95  )-----------( 133      ( 10 Oct 2020 00:53 )             25.5     10-10    132<L>  |  101  |  8   ----------------------------<  234<H>  3.8   |  19<L>  |  <0.30<L>    Ca    9.0      10 Oct 2020 00:53  Phos  3.0     10-10  Mg     1.5     10-10    TPro  5.6<L>  /  Alb  2.8<L>  /  TBili  0.4  /  DBili  x   /  AST  19  /  ALT  40  /  AlkPhos  38<L>  10-10    PT/INR - ( 10 Oct 2020 00:53 )   PT: 13.3 sec;   INR: 1.11 ratio         PTT - ( 10 Oct 2020 00:53 )  PTT:28.9 sec    Culture - Blood (collected 09 Oct 2020 02:19)  Source: .Blood Blood-Venous  Preliminary Report (10 Oct 2020 03:01):    No growth to date.    Culture - Blood (collected 09 Oct 2020 02:19)  Source: .Blood Blood-Peripheral  Preliminary Report (10 Oct 2020 03:01):    No growth to date.    Culture - Sputum (collected 08 Oct 2020 12:31)  Source: .Sputum Sputum trap  Gram Stain (08 Oct 2020 22:59):    Rare polymorphonuclear leukocytes per low power field    No squamous epithelial cells per low power field    Rare Gram positive cocci in pairs per oil power field  Preliminary Report (09 Oct 2020 17:23):    Normal Respiratory Shanda present    Culture - Urine (collected 07 Oct 2020 15:26)  Source: .Urine Clean Catch (Midstream)  Final Report (09 Oct 2020 08:38):    >=3 organisms. Probable collection contamination.    Culture - Blood (collected 07 Oct 2020 15:07)  Source: .Blood Blood-Peripheral  Gram Stain (08 Oct 2020 13:24):    Growth in anaerobic bottle: Gram Positive Rods    Growth in aerobic bottle: Gram Positive Rods  Preliminary Report (09 Oct 2020 11:22):    Listeria monocytogenes    Resistance to ampicillin or penicillin for Listeria    monocytogenes has not been described. L. monocytogenes is intrinsically    resistant to cephalosporins.    "Due to technical problems, Proteus sp. will Not be reported as partof    the BCID panel until further notice"    ***Blood Panel PCR results on this specimen are available    approximately 3 hours after the Gram stain result.***    Gram stain, PCR, and/or culture results may not always    correspond due to difference in methodologies.    ************************************************************    This PCR assay was performed using [employer].    The following targets are tested for: Enterococcus,    vancomycin resistant enterococci, Listeria monocytogenes,    coagulase negative staphylococci, S. aureus,    methicillin resistant S. aureus, Streptococcus agalactiae    (Group B), S. pneumoniae, S. pyogenes (Group A),    Acinetobacter baumannii, Enterobacter cloacae, E. coli,    Klebsiella oxytoca, K. pneumoniae, Proteus sp.,    Serratia marcescens, Haemophilus influenzae,    Neisseria meningitidis, Pseudomonas aeruginosa, Candida    albicans, C. glabrata, C krusei, C parapsilosis,    C. tropicalis and the KPC resistance gene.  Organism: Blood Culture PCR (08 Oct 2020 15:08)  Organism: Blood Culture PCR (08 Oct 2020 15:08)    Culture - Blood (collected 07 Oct 2020 15:07)  Source: .Blood Blood-Peripheral  Gram Stain (08 Oct 2020 21:16):    Growth in anaerobic bottle: Gram Positive Rods    Growth in aerobic bottle: Gram Positive Rods  Final Report (09 Oct 2020 11:03):    Growth in aerobic and anaerobic bottles: Listeria monocytogenes    Resistance to ampicillin or penicillin for Listeria    monocytogenes has not been described. L. monocytogenes is intrinsically    resistant to cephalosporins.    RADIOLOGY & ADDITIONAL TESTS:    < from: CT Chest No Cont (10.07.20 @ 15:24) >    EXAM:  CT CHEST                            PROCEDURE DATE:  10/07/2020            INTERPRETATION:  CLINICAL INFORMATION: Dyspnea. Rheumatoid arthritis. Immunosuppressed.    COMPARISON: Chest radiograph 10/7/2020 and CT chest 5/27/2005.    PROCEDURE:  CT of the Chest was performed without intravenous contrast.  Sagittal and coronal reformats were performed.    FINDINGS:    LUNGS AND AIRWAYS: Low lung volumes. Elevated diaphragm. Bilateral consolidations in a posterior and dependent distribution.  PLEURA: No pleural effusion.  MEDIASTINUM AND ROSEMARY: No lymphadenopathy.  VESSELS: Aortic calcifications.  HEART: Heart size is normal. No pericardial effusion. Coronary artery calcifications  CHEST WALL AND LOWER NECK: Within normal limits.  VISUALIZED UPPER ABDOMEN: Within normal limits.  BONES: Within normal limits.    IMPRESSION:  Bilateral posterior and dependent consolidations, likely representing atelectasis.      DEMETRA HIGUERA M.D., RADIOLOGY RESIDENT  This document has been electronically signed.  JOSÉ MIGUEL RUSSELL M.D., ATTENDING RADIOLOGIST  This document has been electronically signed. Oct  7 2020  4:36PM    < end of copied text >

## 2020-10-10 NOTE — PROGRESS NOTE ADULT - ASSESSMENT
70 yo female with RA , DM,HTN,fibromyalgia, and remote repair of cerebral aneurysm now admitted with fever,weakness and hypoxia.  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  She recalls a rash to PCN, therefor is on meropenem which she appears to be tolerating  RVP is negative, MRSA screen positive.  TTE with mild AS, no vegetations.Repeat 10/9 blood cultures are NGSF  She is slowly improving  Suggest:  1.Continue meropenem for listeria bacteremia and concerns of pneumonia  3.I would consider LP although with bacteremia in setting of immunocompromise the duration of treatment typically extended to 3 weeks, therefore treatment duration will not be impacted by LP result  4.The 2 major alternatives to Ampicillin(drug of choice) are meropenem and Bactrim, however the doses of bactrim used are on the high end, 15-20 mg/kg of TMP component daily.Gentamicin is sometimes added for synergy but the renal toxicity in the elderly often outweighs potential benefit.  5.stress steroids with taper.

## 2020-10-10 NOTE — CHART NOTE - NSCHARTNOTEFT_GEN_A_CORE
Patient endorsed increasing shortness of breath and greater than usual chest tightness after having tea. Patient appeared uncomfortable moreso. Vitals were stable, satting >96%. Physical exam (cardiac and lung) were unchanged from prior in AM. Ordered stat EKG which showed sinus rhythm and unchanged from EKG on 10/7. Out of caution, ordered cardiac enzymes and metabolic panel.     Of note, patient endorsed abdominal pain in AM. Prior to event above, ordered abdominal X ray which shows nonspecific bowel gas pattern. Likely patient experiencing heartburn/ gas that is making her uncomfortable. Night team will follow lab results.     Son was at bedside and notified that EKG was grossly unremarkable with low suspicion for ACS event.

## 2020-10-10 NOTE — PROGRESS NOTE ADULT - SUBJECTIVE AND OBJECTIVE BOX
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 1000 milliLiter(s) (50 mL/Hr) IV Continuous <Continuous>  dextrose 50% Injectable 12.5 Gram(s) IV Push once  dextrose 50% Injectable 25 Gram(s) IV Push once  dextrose 50% Injectable 25 Gram(s) IV Push once  insulin lispro (HumaLOG) corrective regimen sliding scale   SubCutaneous every 6 hours  levothyroxine Injectable 50 MICROGram(s) IV Push at bedtime  lisinopril 20 milliGRAM(s) Oral daily  meropenem  IVPB 2000 milliGRAM(s) IV Intermittent every 8 hours  methylPREDNISolone sodium succinate Injectable 8 milliGRAM(s) IV Push daily  potassium phosphate / sodium phosphate Powder (PHOS-NaK) 1 Packet(s) Oral two times a day before meals  prednisoLONE acetate 1% Suspension 1 Drop(s) Both EYES two times a day    T(F): 97.9 (10-10-20 @ 04:00), Max: 98 (10-09-20 @ 07:00)  HR: 75 (10-10-20 @ 06:37)  BP: 131/62 (10-10-20 @ 06:37)  RR: 36 (10-10-20 @ 06:37)  SpO2: 97% (10-10-20 @ 06:37)  Wt(kg): --    PHYSICAL EXAM:  General: alert, moderate respiratory distress  Eyes:  anicteric, no conjunctival injection, no discharge  Oropharynx: no lesions or injection 	  Neck: supple, without adenopathy  Lungs: decreased at bases  Heart: regular rate and rhythm; no murmur,   Abdomen: soft, nondistended, nontender, without mass or organomegaly  Skin: no lesions  Extremities: no clubbing, cyanosis, + edema  Neurologic: alert, oriented, moves all extremities    LAB RESULTS:                        7.9    2.95  )-----------( 133      ( 10 Oct 2020 00:53 )             25.5     10-10    132<L>  |  101  |  8   ----------------------------<  234<H>  3.8   |  19<L>  |  <0.30<L>    Ca    9.0      10 Oct 2020 00:53  Phos  3.0     10-10  Mg     1.5     10-10    TPro  5.6<L>  /  Alb  2.8<L>  /  TBili  0.4  /  DBili  x   /  AST  19  /  ALT  40  /  AlkPhos  38<L>  10-10    LIVER FUNCTIONS - ( 10 Oct 2020 00:53 )  Alb: 2.8 g/dL / Pro: 5.6 g/dL / ALK PHOS: 38 U/L / ALT: 40 U/L / AST: 19 U/L / GGT: x             MICROBIOLOGY:  RECENT CULTURES:  10-09 @ 02:19 .Blood Blood-Peripheral     No growth to date.      10-08 @ 12:31 .Sputum Sputum trap     Normal Respiratory Shanda present    Rare polymorphonuclear leukocytes per low power field  No squamous epithelial cells per low power field  Rare Gram positive cocci in pairs per oil power field    10-07 @ 15:26 .Urine Clean Catch (Midstream)     >=3 organisms. Probable collection contamination.      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 L. monocytogenes is intrinsically  resistant to cephalosporins.    Growth in anaerobic bottle: Gram Positive Rods  Growth in aerobic bottle: Gram Positive Rods        RADIOLOGY REVIEWED:  ad  < from: Xray Chest 1 View- PORTABLE-Urgent (Xray Chest 1 View- PORTABLE-Urgent .) (10.07.20 @ 21:16) >    IMPRESSION:    ET tube is unchanged. Interval placement of enteric tube terminating in the stomach.  Progression of left lower lung opacity may be combination of infection and atelectasis.  No pneumothorax.  Heart size borderline.  No acute bony defect.    < end of copied text >  < from: CT Chest No Cont (10.07.20 @ 15:24) >    IMPRESSION:  Bilateral posterior and dependent consolidations, likely representing atelectasis.    < end of copied text >

## 2020-10-10 NOTE — PROGRESS NOTE ADULT - ASSESSMENT
Patient is a 69yo F w/ RA, DM, HTN, fibromyalgia, hypothyroidism, brain aneurysm s/p repair BIBEMS for AMS brought to MICU for intubation for respiratory distress likely 2/2 multifocal PNA vs atelectasis vs bacteremia, now found to be bacteremic with listeria and on meropenem     #Neuro  - Off pressors  - mental status improving, able to secure own airway  - will consider LP once patient medically stable    #Cardiovascular  - pt received 2x hydralazine overnight  - persistently hypertensive, patient takes ramipril at home. Will continue with lisinopril for now and reassess during day.      #Pulmonary  Acute Hypoxemic Respiratory Failure  - likely 2/2 atelectasis demonstrated on CT chest  - extubated, breathing on RA    #FEN/GI  - Tolerated regular diet yesterday afternoon. 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  - euvolemic on exam, Na corrected  - will monitor and trend with BMP    #Endo  Hypothyroid  - c/w synthroid    DM2  - c/w ISS    #Rheum  -Resume home prednisone 5mg BID- equivalent IV dosing is solumedrol 8mg QD  -Hold etanercept in the setting of sepsis    #Heme  - Leukocytosis resolved, now leukopenic likely in setting of stopping stress dose steroids and in setting of being bacteremic with listeria    Normocytic anemia  - Stable  - f/u iron studies, B12, folate, retic  - pt can't take iron due to constipation    #ID  Blood cultures grew listeria, concern for meningitis   - c/w meropenem, pt gets hives from penicillin  - Repeat cultures neg on 10/9/2020. As per ID, treat for 3 weeks likely  - urine legionella negative  - COVID neg, RVP neg    #Prophylaxis  - Lovenox    #Med Rec  - done    #Ethics  - FULL CODE   Patient is a 71yo F w/ RA, DM, HTN, fibromyalgia, hypothyroidism, brain aneurysm s/p repair BIBEMS for AMS brought to MICU for intubation for respiratory distress likely 2/2 multifocal PNA vs atelectasis vs bacteremia, now found to be bacteremic with listeria and on meropenem.      #Neuro   - Off pressors  - mental status improving, able to secure own airway  - will consider LP once patient medically stable    #Cardiovascular  - pt received 2x hydralazine overnight  - persistently hypertensive, patient takes ramipril at home. Will add labetalol 200 TID and reassess BP.      #Pulmonary  Acute Hypoxemic Respiratory Failure  - likely 2/2 atelectasis demonstrated on CT chest  - extubated, breathing on RA    #FEN/GI  - Tolerated regular diet.   - transaminitis stable and resolving  - has unknown abdominal surgery patient reports. Had some abdominal pain but appears comfortable. 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     Normocytic anemia  - Stable  - f/u iron studies, B12, folate, retic  - pt can't take iron due to constipation    #ID  Blood cultures grew listeria, concern for meningitis   - c/w meropenem for now, pt gets hives from penicillin  - Repeat cultures neg on 10/9/2020. As per ID, treat for 3 weeks likely  - urine legionella negative  - COVID neg, RVP neg    #Prophylaxis  - Patient has unknown allergy to heparin, will confirm with patient and change appropriately. Currently SCDs for now    #Med Rec  - done    #Ethics  - FULL CODE   Patient is a 71yo F w/ RA, DM, HTN, fibromyalgia, hypothyroidism, brain aneurysm s/p repair BIBEMS for AMS brought to MICU for intubation for respiratory distress likely 2/2 multifocal PNA vs atelectasis vs bacteremia, now found to be bacteremic with listeria and on meropenem.      #Neuro   - Off pressors  - mental status improving, able to secure own airway  - will consider LP once patient medically stable    #Cardiovascular  - pt received 2x hydralazine overnight  - persistently hypertensive, patient takes ramipril at home. Will add labetalol 200 TID and reassess BP.      #Pulmonary  Acute Hypoxemic Respiratory Failure  - likely 2/2 atelectasis demonstrated on CT chest  - extubated, breathing on RA    #FEN/GI  - Tolerated regular diet.   - transaminitis stable and resolving  - has unknown abdominal surgery patient reports. Had some abdominal pain but appears comfortable. 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     Normocytic anemia  - Stable  - f/u iron studies, B12, folate, retic  - pt can't take iron due to constipation    #ID  Blood cultures grew listeria, concern for meningitis   - c/w meropenem for now, pt gets hives from penicillin  - Repeat cultures neg on 10/9/2020. 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 Currently SCDs for now    #Med Rec  - done    #Ethics  - FULL CODE

## 2020-10-11 LAB
ALBUMIN SERPL ELPH-MCNC: 2.8 G/DL — LOW (ref 3.3–5)
ALP SERPL-CCNC: 41 U/L — SIGNIFICANT CHANGE UP (ref 40–120)
ALT FLD-CCNC: 30 U/L — SIGNIFICANT CHANGE UP (ref 10–45)
ANION GAP SERPL CALC-SCNC: 10 MMOL/L — SIGNIFICANT CHANGE UP (ref 5–17)
APTT BLD: 27.9 SEC — SIGNIFICANT CHANGE UP (ref 27.5–35.5)
AST SERPL-CCNC: 14 U/L — SIGNIFICANT CHANGE UP (ref 10–40)
BILIRUB SERPL-MCNC: 0.3 MG/DL — SIGNIFICANT CHANGE UP (ref 0.2–1.2)
BUN SERPL-MCNC: 7 MG/DL — SIGNIFICANT CHANGE UP (ref 7–23)
CALCIUM SERPL-MCNC: 8.3 MG/DL — LOW (ref 8.4–10.5)
CHLORIDE SERPL-SCNC: 98 MMOL/L — SIGNIFICANT CHANGE UP (ref 96–108)
CO2 SERPL-SCNC: 22 MMOL/L — SIGNIFICANT CHANGE UP (ref 22–31)
CREAT SERPL-MCNC: <0.3 MG/DL — LOW (ref 0.5–1.3)
GLUCOSE BLDC GLUCOMTR-MCNC: 155 MG/DL — HIGH (ref 70–99)
GLUCOSE BLDC GLUCOMTR-MCNC: 188 MG/DL — HIGH (ref 70–99)
GLUCOSE BLDC GLUCOMTR-MCNC: 192 MG/DL — HIGH (ref 70–99)
GLUCOSE BLDC GLUCOMTR-MCNC: 202 MG/DL — HIGH (ref 70–99)
GLUCOSE BLDC GLUCOMTR-MCNC: 219 MG/DL — HIGH (ref 70–99)
GLUCOSE BLDC GLUCOMTR-MCNC: 227 MG/DL — HIGH (ref 70–99)
GLUCOSE BLDC GLUCOMTR-MCNC: 256 MG/DL — HIGH (ref 70–99)
GLUCOSE SERPL-MCNC: 236 MG/DL — HIGH (ref 70–99)
HCT VFR BLD CALC: 23.5 % — LOW (ref 34.5–45)
HGB BLD-MCNC: 7.1 G/DL — LOW (ref 11.5–15.5)
INR BLD: 1.22 RATIO — HIGH (ref 0.88–1.16)
MAGNESIUM SERPL-MCNC: 1.8 MG/DL — SIGNIFICANT CHANGE UP (ref 1.6–2.6)
MCHC RBC-ENTMCNC: 24.7 PG — LOW (ref 27–34)
MCHC RBC-ENTMCNC: 30.2 GM/DL — LOW (ref 32–36)
MCV RBC AUTO: 81.9 FL — SIGNIFICANT CHANGE UP (ref 80–100)
NRBC # BLD: 2 /100 WBCS — HIGH (ref 0–0)
PHOSPHATE SERPL-MCNC: 2 MG/DL — LOW (ref 2.5–4.5)
PLATELET # BLD AUTO: 126 K/UL — LOW (ref 150–400)
POTASSIUM SERPL-MCNC: 3.7 MMOL/L — SIGNIFICANT CHANGE UP (ref 3.5–5.3)
POTASSIUM SERPL-SCNC: 3.7 MMOL/L — SIGNIFICANT CHANGE UP (ref 3.5–5.3)
PROT SERPL-MCNC: 5.5 G/DL — LOW (ref 6–8.3)
PROTHROM AB SERPL-ACNC: 14.5 SEC — HIGH (ref 10.6–13.6)
RBC # BLD: 2.87 M/UL — LOW (ref 3.8–5.2)
RBC # FLD: 15.9 % — HIGH (ref 10.3–14.5)
SODIUM SERPL-SCNC: 130 MMOL/L — LOW (ref 135–145)
WBC # BLD: 2.28 K/UL — LOW (ref 3.8–10.5)
WBC # FLD AUTO: 2.28 K/UL — LOW (ref 3.8–10.5)

## 2020-10-11 PROCEDURE — 99291 CRITICAL CARE FIRST HOUR: CPT

## 2020-10-11 RX ORDER — INSULIN LISPRO 100/ML
VIAL (ML) SUBCUTANEOUS AT BEDTIME
Refills: 0 | Status: DISCONTINUED | OUTPATIENT
Start: 2020-10-11 | End: 2020-10-20

## 2020-10-11 RX ORDER — SENNA PLUS 8.6 MG/1
2 TABLET ORAL AT BEDTIME
Refills: 0 | Status: DISCONTINUED | OUTPATIENT
Start: 2020-10-11 | End: 2020-10-23

## 2020-10-11 RX ORDER — POLYETHYLENE GLYCOL 3350 17 G/17G
17 POWDER, FOR SOLUTION ORAL DAILY
Refills: 0 | Status: DISCONTINUED | OUTPATIENT
Start: 2020-10-11 | End: 2020-10-23

## 2020-10-11 RX ORDER — LACTULOSE 10 G/15ML
20 SOLUTION ORAL ONCE
Refills: 0 | Status: COMPLETED | OUTPATIENT
Start: 2020-10-11 | End: 2020-10-11

## 2020-10-11 RX ORDER — POTASSIUM PHOSPHATE, MONOBASIC POTASSIUM PHOSPHATE, DIBASIC 236; 224 MG/ML; MG/ML
15 INJECTION, SOLUTION INTRAVENOUS ONCE
Refills: 0 | Status: COMPLETED | OUTPATIENT
Start: 2020-10-11 | End: 2020-10-11

## 2020-10-11 RX ORDER — INSULIN LISPRO 100/ML
VIAL (ML) SUBCUTANEOUS
Refills: 0 | Status: DISCONTINUED | OUTPATIENT
Start: 2020-10-11 | End: 2020-10-20

## 2020-10-11 RX ADMIN — SENNA PLUS 2 TABLET(S): 8.6 TABLET ORAL at 22:50

## 2020-10-11 RX ADMIN — CYCLOSPORINE 1 DROP(S): 0.5 EMULSION OPHTHALMIC at 05:45

## 2020-10-11 RX ADMIN — MEROPENEM 200 MILLIGRAM(S): 1 INJECTION INTRAVENOUS at 22:50

## 2020-10-11 RX ADMIN — Medication 1: at 14:23

## 2020-10-11 RX ADMIN — Medication 50 MICROGRAM(S): at 22:50

## 2020-10-11 RX ADMIN — Medication 2: at 00:36

## 2020-10-11 RX ADMIN — Medication 1 DROP(S): at 17:17

## 2020-10-11 RX ADMIN — Medication 200 MILLIGRAM(S): at 04:24

## 2020-10-11 RX ADMIN — Medication 1 DROP(S): at 05:43

## 2020-10-11 RX ADMIN — POLYETHYLENE GLYCOL 3350 17 GRAM(S): 17 POWDER, FOR SOLUTION ORAL at 12:53

## 2020-10-11 RX ADMIN — CYCLOSPORINE 1 DROP(S): 0.5 EMULSION OPHTHALMIC at 17:17

## 2020-10-11 RX ADMIN — Medication 2: at 18:23

## 2020-10-11 RX ADMIN — Medication 1: at 08:24

## 2020-10-11 RX ADMIN — Medication 1 DROP(S): at 05:45

## 2020-10-11 RX ADMIN — MEROPENEM 200 MILLIGRAM(S): 1 INJECTION INTRAVENOUS at 14:23

## 2020-10-11 RX ADMIN — Medication 200 MILLIGRAM(S): at 22:50

## 2020-10-11 RX ADMIN — Medication 8 MILLIGRAM(S): at 05:43

## 2020-10-11 RX ADMIN — LACTULOSE 20 GRAM(S): 10 SOLUTION ORAL at 12:54

## 2020-10-11 RX ADMIN — LISINOPRIL 20 MILLIGRAM(S): 2.5 TABLET ORAL at 05:43

## 2020-10-11 RX ADMIN — POTASSIUM PHOSPHATE, MONOBASIC POTASSIUM PHOSPHATE, DIBASIC 62.5 MILLIMOLE(S): 236; 224 INJECTION, SOLUTION INTRAVENOUS at 04:24

## 2020-10-11 RX ADMIN — Medication 200 MILLIGRAM(S): at 12:53

## 2020-10-11 RX ADMIN — MEROPENEM 200 MILLIGRAM(S): 1 INJECTION INTRAVENOUS at 05:43

## 2020-10-11 RX ADMIN — FONDAPARINUX SODIUM 2.5 MILLIGRAM(S): 2.5 INJECTION, SOLUTION SUBCUTANEOUS at 12:52

## 2020-10-11 NOTE — PHYSICAL THERAPY INITIAL EVALUATION ADULT - PRECAUTIONS/LIMITATIONS, REHAB EVAL
Per EMS, vitals on scene were 114/70, RA 68 improved NRB to 100%. She also has not taken her meds in 2 days. Patient c/o feeling fatigued, short of breath.  CT chest:   Bilateral posterior and dependent consolidations, likely representing atelectasis.  Hospital course:  10/7 intubated, 10/9 extubated fall precautions/Per EMS, vitals on scene were 114/70, RA 68 improved NRB to 100%. She also has not taken her meds in 2 days. Patient c/o feeling fatigued, short of breath.  CT chest:   Bilateral posterior and dependent consolidations, likely representing atelectasis.  Hospital course:  10/7 intubated, 10/9 extubated

## 2020-10-11 NOTE — PHYSICAL THERAPY INITIAL EVALUATION ADULT - GENERAL OBSERVATIONS, REHAB EVAL
Pt a/w SOB and fevers, +sepsis, listeria bacteremia. Pt received supine, +IVL, +primafit, A&Ox3, follows commands, just finished rolling with RN staff for hygiene, slight SOB, room air O2 sats 96-97%.

## 2020-10-11 NOTE — PHYSICAL THERAPY INITIAL EVALUATION ADULT - ADDITIONAL COMMENTS
per pt and per pts dtr, pt lives at home with spouse- staying at dtrs home with family, assist for ALL ADLs and mobility; assist for dressing and feeding, minimal ambulation; owns rolling walker/WC/shower chair

## 2020-10-11 NOTE — PROGRESS NOTE ADULT - SUBJECTIVE AND OBJECTIVE BOX
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 1000 milliLiter(s) (50 mL/Hr) IV Continuous <Continuous>  dextrose 50% Injectable 12.5 Gram(s) IV Push once  dextrose 50% Injectable 25 Gram(s) IV Push once  dextrose 50% Injectable 25 Gram(s) IV Push once  fondaparinux Injectable 2.5 milliGRAM(s) SubCutaneous daily  insulin lispro (HumaLOG) corrective regimen sliding scale   SubCutaneous three times a day before meals  insulin lispro (HumaLOG) corrective regimen sliding scale   SubCutaneous at bedtime  labetalol 200 milliGRAM(s) Oral three times a day  levothyroxine Injectable 50 MICROGram(s) IV Push at bedtime  lisinopril 20 milliGRAM(s) Oral daily  meropenem  IVPB 2000 milliGRAM(s) IV Intermittent every 8 hours  methylPREDNISolone sodium succinate Injectable 8 milliGRAM(s) IV Push daily  prednisoLONE acetate 1% Suspension 1 Drop(s) Both EYES two times a day    T(F): 98 (10-11-20 @ 05:00), Max: 98.2 (10-10-20 @ 15:00)  HR: 69 (10-11-20 @ 06:00)  BP: 103/58 (10-11-20 @ 06:00)  RR: 29 (10-11-20 @ 06:00)  SpO2: 98% (10-11-20 @ 06:00)  Wt(kg): --    PHYSICAL EXAM:  General: alert, no acute distress  Eyes:  anicteric, no conjunctival injection, no discharge  Oropharynx: no lesions or injection 	  Neck: supple, without adenopathy  Lungs: few ronchi  Heart: regular rate and rhythm; no murmur, rubs or gallops  Abdomen: soft, nondistended, nontender, without mass or organomegaly  Skin: no lesions  Extremities: no clubbing, cyanosis, or edema  Neurologic: alert, oriented, moves all extremities    LAB RESULTS:                        7.1    2.28  )-----------( 126      ( 11 Oct 2020 00:43 )             23.5     10-11    130<L>  |  98  |  7   ----------------------------<  236<H>  3.7   |  22  |  <0.30<L>    Ca    8.3<L>      11 Oct 2020 00:43  Phos  2.0     10-11  Mg     1.8     10-11    TPro  5.5<L>  /  Alb  2.8<L>  /  TBili  0.3  /  DBili  x   /  AST  14  /  ALT  30  /  AlkPhos  41  10-11    LIVER FUNCTIONS - ( 11 Oct 2020 00:43 )  Alb: 2.8 g/dL / Pro: 5.5 g/dL / ALK PHOS: 41 U/L / ALT: 30 U/L / AST: 14 U/L / GGT: x             MICROBIOLOGY:  RECENT CULTURES:  10-09 @ 02:19 .Blood Blood-Peripheral     No growth to date.      10-08 @ 12:31 .Sputum Sputum trap     Normal Respiratory Shanda present    Rare polymorphonuclear leukocytes per low power field  No squamous epithelial cells per low power field  Rare Gram positive cocci in pairs per oil power field    10-07 @ 15:26 .Urine Clean Catch (Midstream)     >=3 organisms. Probable collection contamination.      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 L. monocytogenes is intrinsically  resistant to cephalosporins.    Growth in anaerobic bottle: Gram Positive Rods  Growth in aerobic bottle: Gram Positive Rods        RADIOLOGY REVIEWED:    < from: Xray Abdomen 1 View PORTABLE -Urgent (Xray Abdomen 1 View PORTABLE -Urgent .) (10.10.20 @ 14:53) >  IMPRESSION:  Nonspecific, nonobstructive bowel gas pattern.    < end of copied text >

## 2020-10-11 NOTE — PROGRESS NOTE ADULT - ASSESSMENT
70 yo female with RA , DM,HTN,fibromyalgia, and remote repair of cerebral aneurysm now admitted with fever,weakness and hypoxia.  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  She recalls a rash to PCN, therefor is on meropenem which she appears to be tolerating  RVP is negative, MRSA screen positive.  TTE with mild AS, no vegetations.Repeat 10/9 blood cultures are NGSF  She is slowly improving. She has developed a pancytopenia, somewhat atypical as her RA regimen is not cytotoxic.  Suggest:  1.Continue meropenem for listeria bacteremia and concerns of pneumonia  3.I would consider LP although with bacteremia in setting of immunocompromise the duration of treatment typically extended to 3 weeks, therefore treatment duration will not be impacted by LP result  4.The 2 major alternatives to Ampicillin(drug of choice) are meropenem and Bactrim, however the doses of bactrim used are on the high end, 15-20 mg/kg of TMP component daily.Gentamicin is sometimes added for synergy but the renal toxicity in the elderly often outweighs potential benefit.  5.stress steroids with taper.  4.Favor hematology evaluation.With anemia, low wbc, and low platelets we will be very reluctant to use bactrim here to treat listeria.She will need 3 weeks IV

## 2020-10-11 NOTE — PHYSICAL THERAPY INITIAL EVALUATION ADULT - DISCHARGE DISPOSITION, PT EVAL
Clear
[facility] subacute rehab; if pt to go home, home PT services, assist from family for ALL mobility/ADLs, owns W/C, rolling walker, shower chair (pts dtr requesting a new one as current one is cracked), recommend transportation services home 2* marcy jimenez, VI garcia.

## 2020-10-11 NOTE — PROGRESS NOTE ADULT - ASSESSMENT
Patient is a 69yo F w/ RA, DM, HTN, fibromyalgia, hypothyroidism, brain aneurysm s/p repair BIBEMS for AMS brought to MICU for intubation for respiratory distress likely 2/2 multifocal PNA vs atelectasis vs bacteremia, found to be bacteremic with listeria and on meropenem, now extubated on RA.     #Neuro   - Off pressors  - mental status improving, able to secure own airway  - will consider LP once patient medically stable    #Cardiovascular  - HTN regimen: Labetalol 200 PO TID, lisinopril 20 qd  - did not require hydralazine pushes yesterday     #Pulmonary  Acute Hypoxemic Respiratory Failure  - likely 2/2 atelectasis demonstrated on CT chest  - extubated, breathing on RA    #FEN/GI  - Tolerated regular diet.  - transaminitis stable and resolving  - Abd x-ray showed nonspecific, nonobstructive gas pattern. Pt reports no BM since hospital arrival.  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     Normocytic anemia  - Stable  - f/u iron studies, B12, folate, retic  - pt can't take iron due to constipation    #ID  Blood cultures grew listeria, concern for meningitis   - c/w meropenem for now, pt gets hives from penicillin  - Repeat cultures neg on 10/9/2020. 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 Currently SCDs and fondaparinux 2.5 subQ     #Med Rec  - done    #Ethics  - FULL CODE  - Bed-boarded, awaiting room   Patient is a 69yo F w/ RA, DM, HTN, fibromyalgia, hypothyroidism, brain aneurysm s/p repair BIBEMS for AMS brought to MICU for intubation for respiratory distress likely 2/2 multifocal PNA vs atelectasis vs bacteremia, found to be bacteremic with listeria and on meropenem, now extubated on RA.     #Neuro   - Off pressors  - mental status improving, able to secure own airway  - will consider LP once patient medically stable    #Cardiovascular  - HTN regimen: Labetalol 200 PO TID, lisinopril 20 qd  - did not require hydralazine pushes yesterday     #Pulmonary  Acute Hypoxemic Respiratory Failure  - likely 2/2 atelectasis demonstrated on CT chest  - extubated, breathing on RA    #FEN/GI  - Tolerated regular diet.  - transaminitis stable and resolving  - Abd x-ray showed nonspecific, nonobstructive gas pattern. Pt reports no BM since hospital arrival.  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 meropenem use  - Consulted hematology 10/11 regarding increasing pancytopenia per ID recs before considering transition to Bactrim    Normocytic anemia  - Stable  - f/u iron studies, B12, folate, retic  - pt can't take iron due to constipation    #ID  Blood cultures grew listeria, concern for meningitis   - c/w meropenem for now, pt gets hives from penicillin  - Repeat cultures neg on 10/9/2020. 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 Currently SCDs and fondaparinux 2.5 subQ     #Med Rec  - done    #Ethics  - FULL CODE  - Bed-boarded, awaiting room

## 2020-10-11 NOTE — PROGRESS NOTE ADULT - SUBJECTIVE AND OBJECTIVE BOX
INTERVAL HPI/OVERNIGHT EVENTS:    SUBJECTIVE: Patient seen and examined at bedside. No acute events overnight. Pt sleeping in bed. Still complaining of mild diffuse abd pain. States she has not had a bowel movement since coming to hospital, usually has one everyday at home.      VITAL SIGNS:  ICU Vital Signs Last 24 Hrs  T(C): 36.7 (11 Oct 2020 05:00), Max: 36.8 (10 Oct 2020 15:00)  T(F): 98 (11 Oct 2020 05:00), Max: 98.2 (10 Oct 2020 15:00)  HR: 69 (11 Oct 2020 06:00) (63 - 95)  BP: 103/58 (11 Oct 2020 06:00) (103/58 - 193/92)  BP(mean): 74 (11 Oct 2020 06:00) (74 - 132)  ABP: --  ABP(mean): --  RR: 29 (11 Oct 2020 06:00) (19 - 45)  SpO2: 98% (11 Oct 2020 06:00) (98% - 100%)      Plateau pressure:   P/F ratio:     10-10 @ 07:01  -  10-11 @ 07:00  --------------------------------------------------------  IN: 1805 mL / OUT: 850 mL / NET: 955 mL      CAPILLARY BLOOD GLUCOSE      POCT Blood Glucose.: 227 mg/dL (11 Oct 2020 00:31)    ECG:    PHYSICAL EXAM:    General: very petite woman appears stated age, covered in heat packs and blankets. More responsive than yesterday  HEENT: Sclera anicteric.   Neck: soft,   Chest/Lungs: looks comfortable over all, but +belly breathing  Heart: +left parasternal systolic murmur, regular rhythm  Abdomen: soft, non-tender, normal BS  Extremities: extremities warm and well perfused, no edema noted  Skin: no rashes or lesions  Neuro: More alert, responding to questions   Psych: not assessed    MEDICATIONS:  MEDICATIONS  (STANDING):  artificial  tears Solution 1 Drop(s) Both EYES two times a day  cycloSPORINE (RESTASIS) 0.05% Emulsion 1 Drop(s) Both EYES two times a day  dextrose 5%. 1000 milliLiter(s) (50 mL/Hr) IV Continuous <Continuous>  dextrose 50% Injectable 12.5 Gram(s) IV Push once  dextrose 50% Injectable 25 Gram(s) IV Push once  dextrose 50% Injectable 25 Gram(s) IV Push once  fondaparinux Injectable 2.5 milliGRAM(s) SubCutaneous daily  insulin lispro (HumaLOG) corrective regimen sliding scale   SubCutaneous three times a day before meals  insulin lispro (HumaLOG) corrective regimen sliding scale   SubCutaneous at bedtime  labetalol 200 milliGRAM(s) Oral three times a day  levothyroxine Injectable 50 MICROGram(s) IV Push at bedtime  lisinopril 20 milliGRAM(s) Oral daily  meropenem  IVPB 2000 milliGRAM(s) IV Intermittent every 8 hours  methylPREDNISolone sodium succinate Injectable 8 milliGRAM(s) IV Push daily  polyethylene glycol 3350 17 Gram(s) Oral daily  prednisoLONE acetate 1% Suspension 1 Drop(s) Both EYES two times a day  senna 2 Tablet(s) Oral at bedtime    MEDICATIONS  (PRN):  dextrose 40% Gel 15 Gram(s) Oral once PRN Blood Glucose LESS THAN 70 milliGRAM(s)/deciliter  glucagon  Injectable 1 milliGRAM(s) IntraMuscular once PRN Glucose LESS THAN 70 milligrams/deciliter  hydrALAZINE Injectable 5 milliGRAM(s) IV Push every 6 hours PRN SBP >170  simethicone 80 milliGRAM(s) Chew three times a day PRN Gas      ALLERGIES:  Allergies    heparin (Unknown)  Lyrica (Unknown)  pcn, pineapple (Unknown)  penicillin (Unknown)  Tagamet (Unknown)  walnut (Unknown)    Intolerances        LABS:                        7.1    2.28  )-----------( 126      ( 11 Oct 2020 00:43 )             23.5     10-11    130<L>  |  98  |  7   ----------------------------<  236<H>  3.7   |  22  |  <0.30<L>    Ca    8.3<L>      11 Oct 2020 00:43  Phos  2.0     10-11  Mg     1.8     10-11    TPro  5.5<L>  /  Alb  2.8<L>  /  TBili  0.3  /  DBili  x   /  AST  14  /  ALT  30  /  AlkPhos  41  10-11    PT/INR - ( 11 Oct 2020 00:42 )   PT: 14.5 sec;   INR: 1.22 ratio         PTT - ( 11 Oct 2020 00:42 )  PTT:27.9 sec      RADIOLOGY & ADDITIONAL TESTS: Reviewed.    < from: Xray Abdomen 1 View PORTABLE -Urgent (Xray Abdomen 1 View PORTABLE -Urgent .) (10.10.20 @ 14:53) >  IMPRESSION:  Nonspecific, nonobstructive bowel gas pattern.    < end of copied text >

## 2020-10-11 NOTE — PHYSICAL THERAPY INITIAL EVALUATION ADULT - PERTINENT HX OF CURRENT PROBLEM, REHAB EVAL
Pt is a 71 y/o female admitted to [facility] on 10/7/20 with history of RA, DM, HTN, fibromyalgia, hypothyroidism, brain aneurysm s/p repair BIBEMS for shortness of breath and fever. Per EMS, patient has had 3 days of fever, Tmax 102.5 this morning 7-8 AM, no known COVID exposures, took tylenol at unknown time before being brought in. Symptoms started on Monday.

## 2020-10-11 NOTE — CHART NOTE - NSCHARTNOTEFT_GEN_A_CORE
MAR accept note     Transfer from: MICU  Transfer to:  (x ) Medicine    (  ) Telemetry    (  ) RCU    (  ) Palliative    (  ) Stroke Unit    (  ) _______________    Accepting physician:  Dr. Jhonny Flores      MICU Course:   Patient is a 69 yo F with history of RA, DM, HTN, fibromyalgia, hypothyroidism, brain aneurysm s/p repair BIBEMS for shortness of breath and fever. Pt was placed on BIPAP in ED, and continued to be hypoxic with AMS. Pt was intubated in ED for acute hypoxemic respiratory failure 2/2 multifocal PNA vs atelectasis    Pt was intubated on 10/7. Pt initially required levophed in ED, but was weaned. Pt was started on vanco, ceftriaxone, and azithromycin. COVID and RVP were negative. Urine legionella was negative. Blood cultures grew listeria and meropenem was started. Other abx discontinued.  Pt was extubated on 10/9 at 5am and mental status improved. Pt passed bedside swallow and was tolerating PO with regular diet.    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     #Neuro   - Off pressors  - mental status improving, able to secure own airway  - will consider LP once patient medically stable    #Cardiovascular  - HTN regimen: Labetalol 200 PO TID, lisinopril 20 qd  - did not require hydralazine pushes yesterday     #Pulmonary  Acute Hypoxemic Respiratory Failure  - likely 2/2 atelectasis demonstrated on CT chest  - extubated, breathing on RA    #FEN/GI  - Tolerated regular diet.  - transaminitis stable and resolving  - Abd x-ray showed nonspecific, nonobstructive gas pattern. Pt reports no BM since hospital arrival.  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 meropenem use  - Consulted hematology 10/11 regarding increasing pancytopenia per ID recs before considering transition to Bactrim    Normocytic anemia  - Stable  - f/u iron studies, B12, folate, retic  - pt can't take iron due to constipation    #ID  Blood cultures grew listeria, concern for meningitis   - c/w meropenem for now, pt gets hives from penicillin  - Repeat cultures neg on 10/9/2020. 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 Currently SCDs and fondaparinux 2.5 subQ     #Med Rec  - done    #Ethics  - FULL CODE        For Followup:  [] c/w abx, convert to PO  [] f/u heme c/s for pancytopenia  [] f/u pulm recs  [] f/u ID recs  [] may need addition antihypertensives when she leaves  [] restart PO prednisone 5 10/12 for RA      Vital Signs Last 24 Hrs  T(C): 36.4 (11 Oct 2020 16:00), Max: 36.7 (10 Oct 2020 20:00)  T(F): 97.5 (11 Oct 2020 16:00), Max: 98.1 (10 Oct 2020 20:00)  HR: 73 (11 Oct 2020 17:00) (61 - 90)  BP: 140/73 (11 Oct 2020 17:00) (92/50 - 181/77)  BP(mean): 100 (11 Oct 2020 17:00) (66 - 112)  RR: 28 (11 Oct 2020 17:00) (14 - 36)  SpO2: 99% (11 Oct 2020 17:00) (98% - 100%)  I&O's Summary    10 Oct 2020 07:01  -  11 Oct 2020 07:00  --------------------------------------------------------  IN: 1805 mL / OUT: 850 mL / NET: 955 mL    11 Oct 2020 07:01  -  11 Oct 2020 18:25  --------------------------------------------------------  IN: 1258 mL / OUT: 700 mL / NET: 558 mL          MEDICATIONS  (STANDING):  artificial  tears Solution 1 Drop(s) Both EYES two times a day  cycloSPORINE (RESTASIS) 0.05% Emulsion 1 Drop(s) Both EYES two times a day  dextrose 5%. 1000 milliLiter(s) (50 mL/Hr) IV Continuous <Continuous>  dextrose 50% Injectable 12.5 Gram(s) IV Push once  dextrose 50% Injectable 25 Gram(s) IV Push once  dextrose 50% Injectable 25 Gram(s) IV Push once  fondaparinux Injectable 2.5 milliGRAM(s) SubCutaneous daily  insulin lispro (HumaLOG) corrective regimen sliding scale   SubCutaneous three times a day before meals  insulin lispro (HumaLOG) corrective regimen sliding scale   SubCutaneous at bedtime  labetalol 200 milliGRAM(s) Oral three times a day  levothyroxine Injectable 50 MICROGram(s) IV Push at bedtime  lisinopril 20 milliGRAM(s) Oral daily  meropenem  IVPB 2000 milliGRAM(s) IV Intermittent every 8 hours  polyethylene glycol 3350 17 Gram(s) Oral daily  prednisoLONE acetate 1% Suspension 1 Drop(s) Both EYES two times a day  senna 2 Tablet(s) Oral at bedtime    MEDICATIONS  (PRN):  dextrose 40% Gel 15 Gram(s) Oral once PRN Blood Glucose LESS THAN 70 milliGRAM(s)/deciliter  glucagon  Injectable 1 milliGRAM(s) IntraMuscular once PRN Glucose LESS THAN 70 milligrams/deciliter  hydrALAZINE Injectable 5 milliGRAM(s) IV Push every 6 hours PRN SBP >170  simethicone 80 milliGRAM(s) Chew three times a day PRN Gas        LABS                                            7.1                   Neurophils% (auto):   x      (10-11 @ 00:43):    2.28 )-----------(126          Lymphocytes% (auto):  x                                             23.5                   Eosinphils% (auto):   x        Manual%: Neutrophils x    ; Lymphocytes x    ; Eosinophils x    ; Bands%: x    ; Blasts x                                    130    |  98     |  7                   Calcium: 8.3   / iCa: x      (10-11 @ 00:43)    ----------------------------<  236       Magnesium: 1.8                              3.7     |  22     |  <0.30            Phosphorous: 2.0      TPro  5.5    /  Alb  2.8    /  TBili  0.3    /  DBili  x      /  AST  14     /  ALT  30     /  AlkPhos  41     11 Oct 2020 00:43    ( 10-11 @ 00:42 )   PT: 14.5 sec;   INR: 1.22 ratio  aPTT: 27.9 sec

## 2020-10-12 LAB
ALBUMIN SERPL ELPH-MCNC: 2.9 G/DL — LOW (ref 3.3–5)
ALP SERPL-CCNC: 49 U/L — SIGNIFICANT CHANGE UP (ref 40–120)
ALT FLD-CCNC: 30 U/L — SIGNIFICANT CHANGE UP (ref 10–45)
ANION GAP SERPL CALC-SCNC: 7 MMOL/L — SIGNIFICANT CHANGE UP (ref 5–17)
APTT BLD: 31.8 SEC — SIGNIFICANT CHANGE UP (ref 27.5–35.5)
AST SERPL-CCNC: 19 U/L — SIGNIFICANT CHANGE UP (ref 10–40)
BASOPHILS # BLD AUTO: 0 K/UL — SIGNIFICANT CHANGE UP (ref 0–0.2)
BASOPHILS NFR BLD AUTO: 0 % — SIGNIFICANT CHANGE UP (ref 0–2)
BILIRUB SERPL-MCNC: 0.2 MG/DL — SIGNIFICANT CHANGE UP (ref 0.2–1.2)
BLD GP AB SCN SERPL QL: NEGATIVE — SIGNIFICANT CHANGE UP
BUN SERPL-MCNC: 11 MG/DL — SIGNIFICANT CHANGE UP (ref 7–23)
CALCIUM SERPL-MCNC: 8.6 MG/DL — SIGNIFICANT CHANGE UP (ref 8.4–10.5)
CHLORIDE SERPL-SCNC: 102 MMOL/L — SIGNIFICANT CHANGE UP (ref 96–108)
CO2 SERPL-SCNC: 27 MMOL/L — SIGNIFICANT CHANGE UP (ref 22–31)
CREAT SERPL-MCNC: <0.3 MG/DL — LOW (ref 0.5–1.3)
EOSINOPHIL # BLD AUTO: 0 K/UL — SIGNIFICANT CHANGE UP (ref 0–0.5)
EOSINOPHIL NFR BLD AUTO: 0 % — SIGNIFICANT CHANGE UP (ref 0–6)
GIANT PLATELETS BLD QL SMEAR: PRESENT — SIGNIFICANT CHANGE UP
GLUCOSE BLDC GLUCOMTR-MCNC: 161 MG/DL — HIGH (ref 70–99)
GLUCOSE BLDC GLUCOMTR-MCNC: 162 MG/DL — HIGH (ref 70–99)
GLUCOSE BLDC GLUCOMTR-MCNC: 218 MG/DL — HIGH (ref 70–99)
GLUCOSE SERPL-MCNC: 134 MG/DL — HIGH (ref 70–99)
HCT VFR BLD CALC: 24.9 % — LOW (ref 34.5–45)
HGB BLD-MCNC: 7.4 G/DL — LOW (ref 11.5–15.5)
INR BLD: 1.2 RATIO — HIGH (ref 0.88–1.16)
LYMPHOCYTES # BLD AUTO: 0.95 K/UL — LOW (ref 1–3.3)
LYMPHOCYTES # BLD AUTO: 28.3 % — SIGNIFICANT CHANGE UP (ref 13–44)
MAGNESIUM SERPL-MCNC: 2.1 MG/DL — SIGNIFICANT CHANGE UP (ref 1.6–2.6)
MANUAL SMEAR VERIFICATION: SIGNIFICANT CHANGE UP
MCHC RBC-ENTMCNC: 25 PG — LOW (ref 27–34)
MCHC RBC-ENTMCNC: 29.7 GM/DL — LOW (ref 32–36)
MCV RBC AUTO: 84.1 FL — SIGNIFICANT CHANGE UP (ref 80–100)
MONOCYTES # BLD AUTO: 0.18 K/UL — SIGNIFICANT CHANGE UP (ref 0–0.9)
MONOCYTES NFR BLD AUTO: 5.3 % — SIGNIFICANT CHANGE UP (ref 2–14)
MYELOCYTES NFR BLD: 4.4 % — HIGH (ref 0–0)
NEUTROPHILS # BLD AUTO: 2.09 K/UL — SIGNIFICANT CHANGE UP (ref 1.8–7.4)
NEUTROPHILS NFR BLD AUTO: 62 % — SIGNIFICANT CHANGE UP (ref 43–77)
NRBC # BLD: 3 /100 — HIGH (ref 0–0)
PHOSPHATE SERPL-MCNC: 1.8 MG/DL — LOW (ref 2.5–4.5)
PLAT MORPH BLD: NORMAL — SIGNIFICANT CHANGE UP
PLATELET # BLD AUTO: 175 K/UL — SIGNIFICANT CHANGE UP (ref 150–400)
POTASSIUM SERPL-MCNC: 4.1 MMOL/L — SIGNIFICANT CHANGE UP (ref 3.5–5.3)
POTASSIUM SERPL-SCNC: 4.1 MMOL/L — SIGNIFICANT CHANGE UP (ref 3.5–5.3)
PROT SERPL-MCNC: 5.4 G/DL — LOW (ref 6–8.3)
PROTHROM AB SERPL-ACNC: 14 SEC — HIGH (ref 10.6–13.6)
RBC # BLD: 2.96 M/UL — LOW (ref 3.8–5.2)
RBC # FLD: 16.3 % — HIGH (ref 10.3–14.5)
RBC BLD AUTO: SIGNIFICANT CHANGE UP
RH IG SCN BLD-IMP: POSITIVE — SIGNIFICANT CHANGE UP
SODIUM SERPL-SCNC: 136 MMOL/L — SIGNIFICANT CHANGE UP (ref 135–145)
WBC # BLD: 3.37 K/UL — LOW (ref 3.8–10.5)
WBC # FLD AUTO: 3.37 K/UL — LOW (ref 3.8–10.5)

## 2020-10-12 PROCEDURE — 71045 X-RAY EXAM CHEST 1 VIEW: CPT | Mod: 26

## 2020-10-12 RX ADMIN — Medication 1 DROP(S): at 18:04

## 2020-10-12 RX ADMIN — MEROPENEM 200 MILLIGRAM(S): 1 INJECTION INTRAVENOUS at 15:00

## 2020-10-12 RX ADMIN — Medication 1: at 18:02

## 2020-10-12 RX ADMIN — SENNA PLUS 2 TABLET(S): 8.6 TABLET ORAL at 21:54

## 2020-10-12 RX ADMIN — Medication 5 MILLIGRAM(S): at 06:04

## 2020-10-12 RX ADMIN — MEROPENEM 200 MILLIGRAM(S): 1 INJECTION INTRAVENOUS at 21:54

## 2020-10-12 RX ADMIN — Medication 50 MICROGRAM(S): at 21:55

## 2020-10-12 RX ADMIN — Medication 1 DROP(S): at 06:05

## 2020-10-12 RX ADMIN — CYCLOSPORINE 1 DROP(S): 0.5 EMULSION OPHTHALMIC at 06:05

## 2020-10-12 RX ADMIN — Medication 200 MILLIGRAM(S): at 06:04

## 2020-10-12 RX ADMIN — MEROPENEM 200 MILLIGRAM(S): 1 INJECTION INTRAVENOUS at 06:04

## 2020-10-12 RX ADMIN — Medication 200 MILLIGRAM(S): at 18:04

## 2020-10-12 RX ADMIN — FONDAPARINUX SODIUM 2.5 MILLIGRAM(S): 2.5 INJECTION, SOLUTION SUBCUTANEOUS at 18:02

## 2020-10-12 RX ADMIN — Medication 200 MILLIGRAM(S): at 21:54

## 2020-10-12 RX ADMIN — LISINOPRIL 20 MILLIGRAM(S): 2.5 TABLET ORAL at 06:04

## 2020-10-12 RX ADMIN — Medication 1: at 10:48

## 2020-10-12 NOTE — PROGRESS NOTE ADULT - ASSESSMENT
72 yo female with RA, DM, HTN, fibromyalgia, immune suppressed at home on Enbrel and 5 mg of prednisone bid  and remote repair of cerebral aneurysm now admitted with fever, weakness and hypoxia.  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  Listeria is not typically associated with pneumonia  She was alert but has had mild headaches, concerning about CNS spread.  She recalls a rash to PCN, therefor is on meropenem which she appears to be tolerating  RVP is negative, MRSA screen positive.  TTE with mild AS, no vegetations.  Repeat 10/9 blood cultures are NG to date  She developed pancytopenia, now slowly improving.    Suggest:  Continue meropenem for listeria bacteremia and concerns of pneumonia  Could consider LP although with bacteremia in setting of immunocompromise the duration of treatment typically extended to 3 weeks, therefore treatment duration will not be impacted by LP result.  The 2 major alternatives to Ampicillin(drug of choice) are meropenem and Bactrim, however the doses of bactrim used are on the high end, 15-20 mg/kg of TMP component daily. Gentamicin is sometimes added for synergy but the renal toxicity in the elderly often outweighs potential benefit.  Stress steroids with taper.  Favor hematology evaluation. With anemia, low wbc, and low platelets we will be very reluctant to use bactrim here to treat listeria. She will need 3 weeks IV & today is D # 5/21

## 2020-10-12 NOTE — PROGRESS NOTE ADULT - SUBJECTIVE AND OBJECTIVE BOX
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 Never had a big appetite     REVIEW OF SYSTEMS:  All other review of systems negative (Comprehensive ROS)    Antimicrobials Day #  :  D # 5  meropenem  IVPB 2000 milliGRAM(s) IV Intermittent every 8 hours    Other Medications Reviewed    T(F): 98.2 (10-12-20 @ 12:29), Max: 98.2 (10-12-20 @ 12:29)  HR: 74 (10-12-20 @ 12:29)  BP: 127/78 (10-12-20 @ 12:29)  RR: 18 (10-12-20 @ 12:29)  SpO2: 96% (10-12-20 @ 12:29)  Wt(kg): --    PHYSICAL EXAM:  General: alert, no acute distress  Eyes:  anicteric, no conjunctival injection, no discharge  Neck: supple  Chest: clear to auscultation   Heart: regular rate and rhythm; no murmurs  Abdomen: soft, nondistended, nontender, bowel sounds +  Skin: no lesions  Extremities: no clubbing, cyanosis, or edema  Neurologic: alert, oriented, moves all extremities    LAB RESULTS:                        7.4    3.37  )-----------( 175      ( 12 Oct 2020 06:32 )             24.9     10-12    136  |  102  |  11  ----------------------------<  134<H>  4.1   |  27  |  <0.30<L>    Ca    8.6      12 Oct 2020 06:31  Phos  1.8     10-12  Mg     2.1     10-12    TPro  5.4<L>  /  Alb  2.9<L>  /  TBili  0.2  /  DBili  x   /  AST  19  /  ALT  30  /  AlkPhos  49  10-12    LIVER FUNCTIONS - ( 12 Oct 2020 06:31 )  Alb: 2.9 g/dL / Pro: 5.4 g/dL / ALK PHOS: 49 U/L / ALT: 30 U/L / AST: 19 U/L / GGT: x             MICROBIOLOGY:  RECENT CULTURES:  10-09 @ 02:19 .Blood Blood-Peripheral     No growth to date.      10-08 @ 12:31 .Sputum Sputum trap     Normal Respiratory Shanda present    Rare polymorphonuclear leukocytes per low power field  No squamous epithelial cells per low power field  Rare Gram positive cocci in pairs per oil power field    10-07 @ 15:26 .Urine Clean Catch (Midstream)     >=3 organisms. Probable collection contamination.      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 L. monocytogenes is intrinsically  resistant to cephalosporins.    Growth in anaerobic bottle: Gram Positive Rods  Growth in aerobic bottle: Gram Positive Rods              RADIOLOGY REVIEWED:

## 2020-10-12 NOTE — CHART NOTE - NSCHARTNOTEFT_GEN_A_CORE
[facility] Medicine Chart Note  Notified by radiology result of CXR after PICC placement today:  right sided PICC terminates at the IVC/right atrial junction.    Above discussed with PICC team who contact radiology and confirm in order adjust PICC line.  Will continue to monitor and endorse to night team. Follow up with PICC team for adjustment.    Jessica Clements PA-C  Dept of Medicine  [phone]

## 2020-10-13 LAB
ALBUMIN SERPL ELPH-MCNC: 3.1 G/DL — LOW (ref 3.3–5)
ALP SERPL-CCNC: 57 U/L — SIGNIFICANT CHANGE UP (ref 40–120)
ALT FLD-CCNC: 31 U/L — SIGNIFICANT CHANGE UP (ref 10–45)
ANION GAP SERPL CALC-SCNC: 8 MMOL/L — SIGNIFICANT CHANGE UP (ref 5–17)
AST SERPL-CCNC: 17 U/L — SIGNIFICANT CHANGE UP (ref 10–40)
BILIRUB SERPL-MCNC: 0.4 MG/DL — SIGNIFICANT CHANGE UP (ref 0.2–1.2)
BUN SERPL-MCNC: 9 MG/DL — SIGNIFICANT CHANGE UP (ref 7–23)
CALCIUM SERPL-MCNC: 9.2 MG/DL — SIGNIFICANT CHANGE UP (ref 8.4–10.5)
CHLORIDE SERPL-SCNC: 97 MMOL/L — SIGNIFICANT CHANGE UP (ref 96–108)
CO2 SERPL-SCNC: 30 MMOL/L — SIGNIFICANT CHANGE UP (ref 22–31)
CREAT SERPL-MCNC: <0.3 MG/DL — LOW (ref 0.5–1.3)
GLUCOSE BLDC GLUCOMTR-MCNC: 148 MG/DL — HIGH (ref 70–99)
GLUCOSE BLDC GLUCOMTR-MCNC: 149 MG/DL — HIGH (ref 70–99)
GLUCOSE BLDC GLUCOMTR-MCNC: 176 MG/DL — HIGH (ref 70–99)
GLUCOSE BLDC GLUCOMTR-MCNC: 207 MG/DL — HIGH (ref 70–99)
GLUCOSE SERPL-MCNC: 110 MG/DL — HIGH (ref 70–99)
HCT VFR BLD CALC: 27.9 % — LOW (ref 34.5–45)
HGB BLD-MCNC: 8 G/DL — LOW (ref 11.5–15.5)
MAGNESIUM SERPL-MCNC: 2.1 MG/DL — SIGNIFICANT CHANGE UP (ref 1.6–2.6)
MCHC RBC-ENTMCNC: 24.2 PG — LOW (ref 27–34)
MCHC RBC-ENTMCNC: 28.7 GM/DL — LOW (ref 32–36)
MCV RBC AUTO: 84.3 FL — SIGNIFICANT CHANGE UP (ref 80–100)
NRBC # BLD: 2 /100 WBCS — HIGH (ref 0–0)
PHOSPHATE SERPL-MCNC: 1.6 MG/DL — LOW (ref 2.5–4.5)
PLATELET # BLD AUTO: 228 K/UL — SIGNIFICANT CHANGE UP (ref 150–400)
POTASSIUM SERPL-MCNC: 4.1 MMOL/L — SIGNIFICANT CHANGE UP (ref 3.5–5.3)
POTASSIUM SERPL-SCNC: 4.1 MMOL/L — SIGNIFICANT CHANGE UP (ref 3.5–5.3)
PROT SERPL-MCNC: 5.8 G/DL — LOW (ref 6–8.3)
RBC # BLD: 3.31 M/UL — LOW (ref 3.8–5.2)
RBC # FLD: 16.2 % — HIGH (ref 10.3–14.5)
SODIUM SERPL-SCNC: 135 MMOL/L — SIGNIFICANT CHANGE UP (ref 135–145)
T4 FREE SERPL-MCNC: 1.7 NG/DL — SIGNIFICANT CHANGE UP (ref 0.9–1.8)
TSH SERPL-MCNC: 0.68 UIU/ML — SIGNIFICANT CHANGE UP (ref 0.27–4.2)
WBC # BLD: 5.55 K/UL — SIGNIFICANT CHANGE UP (ref 3.8–10.5)
WBC # FLD AUTO: 5.55 K/UL — SIGNIFICANT CHANGE UP (ref 3.8–10.5)

## 2020-10-13 RX ORDER — LEVOTHYROXINE SODIUM 125 MCG
75 TABLET ORAL AT BEDTIME
Refills: 0 | Status: DISCONTINUED | OUTPATIENT
Start: 2020-10-13 | End: 2020-10-22

## 2020-10-13 RX ADMIN — CYCLOSPORINE 1 DROP(S): 0.5 EMULSION OPHTHALMIC at 23:24

## 2020-10-13 RX ADMIN — Medication 200 MILLIGRAM(S): at 21:16

## 2020-10-13 RX ADMIN — Medication 2: at 12:41

## 2020-10-13 RX ADMIN — CYCLOSPORINE 1 DROP(S): 0.5 EMULSION OPHTHALMIC at 05:56

## 2020-10-13 RX ADMIN — Medication 1 DROP(S): at 17:25

## 2020-10-13 RX ADMIN — Medication 75 MICROGRAM(S): at 23:24

## 2020-10-13 RX ADMIN — Medication 5 MILLIGRAM(S): at 05:56

## 2020-10-13 RX ADMIN — MEROPENEM 200 MILLIGRAM(S): 1 INJECTION INTRAVENOUS at 15:54

## 2020-10-13 RX ADMIN — Medication 62.5 MILLIMOLE(S): at 12:17

## 2020-10-13 RX ADMIN — POLYETHYLENE GLYCOL 3350 17 GRAM(S): 17 POWDER, FOR SOLUTION ORAL at 12:16

## 2020-10-13 RX ADMIN — FONDAPARINUX SODIUM 2.5 MILLIGRAM(S): 2.5 INJECTION, SOLUTION SUBCUTANEOUS at 12:38

## 2020-10-13 RX ADMIN — Medication 1 DROP(S): at 05:55

## 2020-10-13 RX ADMIN — Medication 200 MILLIGRAM(S): at 05:56

## 2020-10-13 RX ADMIN — LISINOPRIL 20 MILLIGRAM(S): 2.5 TABLET ORAL at 05:56

## 2020-10-13 RX ADMIN — SENNA PLUS 2 TABLET(S): 8.6 TABLET ORAL at 23:31

## 2020-10-13 RX ADMIN — Medication 200 MILLIGRAM(S): at 12:16

## 2020-10-13 RX ADMIN — MEROPENEM 200 MILLIGRAM(S): 1 INJECTION INTRAVENOUS at 05:55

## 2020-10-13 RX ADMIN — MEROPENEM 200 MILLIGRAM(S): 1 INJECTION INTRAVENOUS at 23:24

## 2020-10-13 NOTE — OCCUPATIONAL THERAPY INITIAL EVALUATION ADULT - AMBULATORY DEVICES NEEDED
Pt states she mostly ambulated with hand held assist from , sometimes used RW & kept w/c in basement./yes

## 2020-10-13 NOTE — OCCUPATIONAL THERAPY INITIAL EVALUATION ADULT - BALANCE DISTURBANCE, IDENTIFIED IMPAIRMENT CONTRIBUTE, REHAB EVAL
impaired coordination/impaired postural control/impaired motor control/decreased ROM/decreased strength

## 2020-10-13 NOTE — PROGRESS NOTE ADULT - ASSESSMENT
71 y/o F with PMH of RA, DM, HTN, fibromyalgia, hypothyroidism, brain aneurysm. Presents to ED with SOB and fever. Reports 3 days of fever, Tmax 102.5 this morning, no known COVID exposures. Found to be hypoxic by EMS and placed on 100% NRB. CXR with LLL opacity. Seen in ED - as per nurse pt went to CT chest and came back with increased WOB and altered mental status. S/p intubation in ED / extubated 10/9.

## 2020-10-13 NOTE — OCCUPATIONAL THERAPY INITIAL EVALUATION ADULT - IMPAIRMENTS CONTRIBUTING IMPAIRED BED MOBILITY, REHAB EVAL
impaired balance/impaired motor control/impaired postural control/decreased strength/impaired coordination/decreased ROM

## 2020-10-13 NOTE — OCCUPATIONAL THERAPY INITIAL EVALUATION ADULT - LIVES WITH, PROFILE
Pt lives with  and daughter in daughters house, +15 steps to enter with stair lift, +walk-in shower. PTA, pt states she required assistance for ADL & was able to ambulate with assistance from  short distance to bathroom from bedroom. Owns w/c & RW that she sometimes used & owns shower chair.

## 2020-10-13 NOTE — OCCUPATIONAL THERAPY INITIAL EVALUATION ADULT - NS ASR OT EQUIP NEEDS DISCH
TBD at rehab. If pt goes home, pt states she does not want a hospital bed or candi lift. If pt will be OOB upon d/c, will require mechanical lift from bed to w/c.

## 2020-10-13 NOTE — OCCUPATIONAL THERAPY INITIAL EVALUATION ADULT - ANTICIPATED DISCHARGE DISPOSITION, OT EVAL
Subacute rehab. If pt goes home, will require assistance for all ADL & home OT to address ADL/IADL, AE/DME, and fall prevention

## 2020-10-13 NOTE — OCCUPATIONAL THERAPY INITIAL EVALUATION ADULT - SITTING BALANCE: DYNAMIC
Add Associated Diagnoses If Applicable When Selecting Medical Necessity: Yes
Include Z78.9 (Other Specified Conditions Influencing Health Status) As An Associated Diagnosis?: No
Detail Level: Detailed
Consent: Written consent obtained and the risks of skin tag removal was reviewed with the patient including but not limited to bleeding, pigmentary change, infection, pain, and remote possibility of scarring.
Anesthesia Volume In Cc: 2
Medical Necessity Clause: This procedure was medically necessary because the lesions that were treated were:
Anesthesia Type: 1% lidocaine with epinephrine
Medical Necessity Information: It is in your best interest to select a reason for this procedure from the list below. All of these items fulfill various CMS LCD requirements except the new and changing color options.
poor balance

## 2020-10-13 NOTE — PROGRESS NOTE ADULT - SUBJECTIVE AND OBJECTIVE BOX
Patient is a 70y old  Female who presents with a chief complaint of SOB (09 Oct 2020 12:09)      SUBJECTIVE / OVERNIGHT EVENTS:  Comfortable, no new symptoms  No need for pressors, BP is stable  Review of Systems:       MEDICATIONS  (STANDING):  artificial  tears Solution 1 Drop(s) Both EYES two times a day  cycloSPORINE (RESTASIS) 0.05% Emulsion 1 Drop(s) Both EYES two times a day  dextrose 5%. 1000 milliLiter(s) (50 mL/Hr) IV Continuous <Continuous>  dextrose 50% Injectable 12.5 Gram(s) IV Push once  dextrose 50% Injectable 25 Gram(s) IV Push once  dextrose 50% Injectable 25 Gram(s) IV Push once  insulin lispro (HumaLOG) corrective regimen sliding scale   SubCutaneous every 6 hours  levothyroxine Injectable 50 MICROGram(s) IV Push at bedtime  lisinopril 20 milliGRAM(s) Oral daily  meropenem  IVPB 2000 milliGRAM(s) IV Intermittent every 8 hours  methylPREDNISolone sodium succinate Injectable 8 milliGRAM(s) IV Push daily  potassium phosphate / sodium phosphate Powder (PHOS-NaK) 1 Packet(s) Oral two times a day before meals  prednisoLONE acetate 1% Suspension 1 Drop(s) Both EYES two times a day    MEDICATIONS  (PRN):  dextrose 40% Gel 15 Gram(s) Oral once PRN Blood Glucose LESS THAN 70 milliGRAM(s)/deciliter  glucagon  Injectable 1 milliGRAM(s) IntraMuscular once PRN Glucose LESS THAN 70 milligrams/deciliter  hydrALAZINE Injectable 5 milliGRAM(s) IV Push every 6 hours PRN SBP >170      PHYSICAL EXAM:  Vital Signs Last 24 Hrs  T(C): 36.6 (09 Oct 2020 20:00), Max: 37 (09 Oct 2020 04:00)  T(F): 97.9 (09 Oct 2020 20:00), Max: 98.6 (09 Oct 2020 04:00)  HR: 88 (09 Oct 2020 22:00) (69 - 124)  BP: 156/71 (09 Oct 2020 22:00) (116/58 - 210/99)  BP(mean): 102 (09 Oct 2020 22:00) (79 - 142)  RR: 16 (09 Oct 2020 22:00) (15 - 46)  SpO2: 98% (09 Oct 2020 22:00) (93% - 100%)  I&O's Summary    08 Oct 2020 07:01  -  09 Oct 2020 07:00  --------------------------------------------------------  IN: 491.4 mL / OUT: 640 mL / NET: -148.6 mL    09 Oct 2020 07:01  -  09 Oct 2020 22:55  --------------------------------------------------------  IN: 2670 mL / OUT: 300 mL / NET: 2370 mL      GENERAL:On a vent  HEAD:  Atraumatic, Normocephalic  EYES: EOMI, PERRLA, conjunctiva and sclera clear  NECK: Supple, No JVD  CHEST/LUNG: Clear to auscultation bilaterally; No wheeze  HEART: Regular rate and rhythm; No murmurs, rubs, or gallops  ABDOMEN: Soft, Nontender, Nondistended; Bowel sounds present  EXTREMITIES:  2+ Peripheral Pulses, No clubbing, cyanosis, or edema  PSYCH: AAOx3  NEUROLOGY: non-focal  SKIN: No rashes or lesions    LABS:  CAPILLARY BLOOD GLUCOSE      POCT Blood Glucose.: 239 mg/dL (09 Oct 2020 18:44)  POCT Blood Glucose.: 104 mg/dL (09 Oct 2020 05:11)  POCT Blood Glucose.: 151 mg/dL (08 Oct 2020 23:13)                          8.1    5.73  )-----------( 140      ( 09 Oct 2020 12:44 )             25.6     10-09    131<L>  |  101  |  10  ----------------------------<  96  3.6   |  16<L>  |  <0.30<L>    Ca    8.8      09 Oct 2020 12:44  Phos  2.1     10-09  Mg     1.6     10-09    TPro  6.0  /  Alb  2.9<L>  /  TBili  0.4  /  DBili  x   /  AST  38  /  ALT  57<H>  /  AlkPhos  41  10-09    PT/INR - ( 09 Oct 2020 00:21 )   PT: 13.6 sec;   INR: 1.14 ratio         PTT - ( 09 Oct 2020 00:21 )  PTT:30.2 sec          RADIOLOGY & ADDITIONAL TESTS:    Imaging Personally Reviewed:    Consultant(s) Notes Reviewed:      Care Discussed with Consultants/Other Providers:

## 2020-10-13 NOTE — PROGRESS NOTE ADULT - SUBJECTIVE AND OBJECTIVE BOX
CC: f/u for  Listeria bacteremia    Patient reports that her headaches are improved & has mild right sided neck pain. Both her wrists her, she has RA but now after IV were placed in both arms - hands feel more swollen & painful     REVIEW OF SYSTEMS:  All other review of systems negative (Comprehensive ROS)    Antimicrobials Day #  : D #6/21   meropenem  IVPB 2000 milliGRAM(s) IV Intermittent every 8 hours    Other Medications Reviewed    T(F): 98.2 (10-13-20 @ 12:10), Max: 98.8 (10-12-20 @ 21:59)  HR: 70 (10-13-20 @ 12:10)  BP: 120/70 (10-13-20 @ 12:10)  RR: 18 (10-13-20 @ 12:10)  SpO2: 98% (10-13-20 @ 12:10)  Wt(kg): --    PHYSICAL EXAM:  General: alert, no acute distress  Eyes:  anicteric, no conjunctival injection, no discharge  Neck: supple  Lungs: clear to auscultation  Heart: regular rate and rhythm; no murmurs  Abdomen: soft, nondistended, nontender  Skin: no lesions  Extremities: no LE edema. Both wrists are swollen  L > R, both upper extremities are weak   Neurologic: alert, oriented, weak upper extremities    LAB RESULTS:                        8.0    5.55  )-----------( 228      ( 13 Oct 2020 06:47 )             27.9     10-13    135  |  97  |  9   ----------------------------<  110<H>  4.1   |  30  |  <0.30<L>    Ca    9.2      13 Oct 2020 06:47  Phos  1.6     10-13  Mg     2.1     10-13    TPro  5.8<L>  /  Alb  3.1<L>  /  TBili  0.4  /  DBili  x   /  AST  17  /  ALT  31  /  AlkPhos  57  10-13    LIVER FUNCTIONS - ( 13 Oct 2020 06:47 )  Alb: 3.1 g/dL / Pro: 5.8 g/dL / ALK PHOS: 57 U/L / ALT: 31 U/L / AST: 17 U/L / GGT: x             MICROBIOLOGY:  RECENT CULTURES:  10-09 @ 02:19 .Blood Blood-Peripheral     No growth to date.      RADIOLOGY REVIEWED:

## 2020-10-13 NOTE — OCCUPATIONAL THERAPY INITIAL EVALUATION ADULT - PRECAUTIONS/LIMITATIONS, REHAB EVAL
Pt was intubated on 10/7. Pt initially required levophed in ED, but was weaned. Pt was started on vanco, ceftriaxone, and azithromycin. COVID and RVP were negative. Urine legionella was negative. Blood cultures grew listeria and meropenem was started. Other abx discontinued.  Pt was extubated on 10/9 at 5am and mental status improved. fall precautions/Pt was intubated on 10/7. Pt initially required levophed in ED, but was weaned. Pt was started on vanco, ceftriaxone, and azithromycin. COVID and RVP were negative. Urine legionella was negative. Blood cultures grew listeria and meropenem was started. Other abx discontinued.  Pt was extubated on 10/9 at 5am and mental status improved.

## 2020-10-13 NOTE — OCCUPATIONAL THERAPY INITIAL EVALUATION ADULT - PERSONAL SAFETY AND JUDGMENT, REHAB EVAL
states she will be able to walk with  at home. Pt with 1-/5 muscle strength b/l LE & unable to stand at this time./impaired

## 2020-10-13 NOTE — OCCUPATIONAL THERAPY INITIAL EVALUATION ADULT - PERTINENT HX OF CURRENT PROBLEM, REHAB EVAL
71 yo F with history of RA, DM, HTN, fibromyalgia, hypothyroidism, brain aneurysm s/p repair BIBEMS for shortness of breath and fever. Pt was placed on BIPAP in ED, and continued to be hypoxic with AMS. Pt was intubated in ED for acute hypoxemic respiratory failure 2/2 multifocal PNA vs atelectasis.

## 2020-10-13 NOTE — PROGRESS NOTE ADULT - SUBJECTIVE AND OBJECTIVE BOX
Follow-up Pulm Progress Note    No new respiratory events overnight.  Denies SOB/CP.   Sats 98% RA    Medications:  MEDICATIONS  (STANDING):  artificial  tears Solution 1 Drop(s) Both EYES two times a day  cycloSPORINE (RESTASIS) 0.05% Emulsion 1 Drop(s) Both EYES two times a day  dextrose 5%. 1000 milliLiter(s) (50 mL/Hr) IV Continuous <Continuous>  dextrose 50% Injectable 12.5 Gram(s) IV Push once  dextrose 50% Injectable 25 Gram(s) IV Push once  dextrose 50% Injectable 25 Gram(s) IV Push once  fondaparinux Injectable 2.5 milliGRAM(s) SubCutaneous daily  insulin lispro (HumaLOG) corrective regimen sliding scale   SubCutaneous three times a day before meals  insulin lispro (HumaLOG) corrective regimen sliding scale   SubCutaneous at bedtime  labetalol 200 milliGRAM(s) Oral three times a day  levothyroxine Injectable 75 MICROGram(s) IV Push at bedtime  lisinopril 20 milliGRAM(s) Oral daily  meropenem  IVPB 2000 milliGRAM(s) IV Intermittent every 8 hours  polyethylene glycol 3350 17 Gram(s) Oral daily  prednisoLONE acetate 1% Suspension 1 Drop(s) Both EYES two times a day  predniSONE   Tablet 5 milliGRAM(s) Oral daily  senna 2 Tablet(s) Oral at bedtime    MEDICATIONS  (PRN):  dextrose 40% Gel 15 Gram(s) Oral once PRN Blood Glucose LESS THAN 70 milliGRAM(s)/deciliter  glucagon  Injectable 1 milliGRAM(s) IntraMuscular once PRN Glucose LESS THAN 70 milligrams/deciliter  hydrALAZINE Injectable 5 milliGRAM(s) IV Push every 6 hours PRN SBP >170  simethicone 80 milliGRAM(s) Chew three times a day PRN Gas          Vital Signs Last 24 Hrs  T(C): 36.8 (13 Oct 2020 12:10), Max: 37.1 (12 Oct 2020 21:59)  T(F): 98.2 (13 Oct 2020 12:10), Max: 98.8 (12 Oct 2020 21:59)  HR: 70 (13 Oct 2020 12:10) (67 - 88)  BP: 120/70 (13 Oct 2020 12:10) (93/58 - 168/84)  BP(mean): --  RR: 18 (13 Oct 2020 12:10) (18 - 18)  SpO2: 98% (13 Oct 2020 12:10) (95% - 98%)          10-12 @ 07:01  -  10-13 @ 07:00  --------------------------------------------------------  IN: 240 mL / OUT: 1600 mL / NET: -1360 mL          LABS:                        8.0    5.55  )-----------( 228      ( 13 Oct 2020 06:47 )             27.9     10-13    135  |  97  |  9   ----------------------------<  110<H>  4.1   |  30  |  <0.30<L>    Ca    9.2      13 Oct 2020 06:47  Phos  1.6     10-13  Mg     2.1     10-13    TPro  5.8<L>  /  Alb  3.1<L>  /  TBili  0.4  /  DBili  x   /  AST  17  /  ALT  31  /  AlkPhos  57  10-13          CAPILLARY BLOOD GLUCOSE      POCT Blood Glucose.: 207 mg/dL (13 Oct 2020 12:39)    PT/INR - ( 12 Oct 2020 08:37 )   PT: 14.0 sec;   INR: 1.20 ratio         PTT - ( 12 Oct 2020 08:37 )  PTT:31.8 sec        CULTURES:     Culture - Blood (collected 10-09-20 @ 02:19)  Source: .Blood Blood-Venous  Preliminary Report (10-10-20 @ 03:01):    No growth to date.    Culture - Blood (collected 10-09-20 @ 02:19)  Source: .Blood Blood-Peripheral  Preliminary Report (10-10-20 @ 03:01):    No growth to date.    Culture - Blood (collected 10-07-20 @ 15:07)  Source: .Blood Blood-Peripheral  Gram Stain (10-08-20 @ 13:24):    Growth in anaerobic bottle: Gram Positive Rods    Growth in aerobic bottle: Gram Positive Rods  Final Report (10-10-20 @ 10:12):    Listeria monocytogenes    Resistance to ampicillin or penicillin for Listeria    monocytogenes has not been described. L. monocytogenes is intrinsically    resistant to cephalosporins.    "Due to technical problems, Proteus sp. will Not be reported as partof    the BCID panel until further notice"    ***Blood Panel PCR results on this specimen are available    approximately 3 hours after the Gram stain result.***    Gram stain, PCR, and/or culture results may not always    correspond due to difference in methodologies.    ************************************************************    This PCR assay was performed using [employer].    The following targets are tested for: Enterococcus,    vancomycin resistant enterococci, Listeria monocytogenes,    coagulase negative staphylococci, S. aureus,    methicillin resistant S. aureus, Streptococcus agalactiae    (Group B), S. pneumoniae, S. pyogenes (Group A),    Acinetobacter baumannii, Enterobacter cloacae, E. coli,    Klebsiella oxytoca, K. pneumoniae, Proteus sp.,    Serratia marcescens, Haemophilus influenzae,    Neisseria meningitidis, Pseudomonas aeruginosa, Candida    albicans, C. glabrata, C krusei, C parapsilosis,    C. tropicalis and the KPC resistance gene.  Organism: Blood Culture PCR (10-10-20 @ 10:12)  Organism: Blood Culture PCR (10-10-20 @ 10:12)      -  Listeria monocytogenes: Detec      Method Type: PCR    Culture - Blood (collected 10-07-20 @ 15:07)  Source: .Blood Blood-Peripheral  Gram Stain (10-08-20 @ 21:16):    Growth in anaerobic bottle: Gram Positive Rods    Growth in aerobic bottle: Gram Positive Rods  Final Report (10-09-20 @ 11:03):    Growth in aerobic and anaerobic bottles: Listeria monocytogenes    Resistance to ampicillin or penicillin for Listeria    monocytogenes has not been described. L. monocytogenes is intrinsically    resistant to cephalosporins.        Culture - Urine (collected 10-07-20 @ 15:26)  Source: .Urine Clean Catch (Midstream)  Final Report (10-09-20 @ 08:38):    >=3 organisms. Probable collection contamination.            Physical Examination:  PULM: Clear to auscultation bilaterally, no significant sputum production  CVS: RRR    RADIOLOGY REVIEWED  CXR 10/12: L pl effusion   bibasilar atelectasis

## 2020-10-13 NOTE — PROGRESS NOTE ADULT - ASSESSMENT
72 yo female with RA, DM, HTN, fibromyalgia, immune suppressed at home on Enbrel and 5 mg of prednisone bid  and remote repair of cerebral aneurysm now admitted with fever, weakness and hypoxia.  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  Required vent & pressors support in ICU for shock - shock resolved.   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  She recalls a rash to PCN, therefor is on meropenem which she appears to be tolerating  RVP is negative, MRSA screen positive.  TTE with mild AS, no vegetations.  Repeat 10/9 blood cultures are NG to date  She had developed pancytopenia, now improved.  S/p RUE PICC placement     Suggest:  Continue meropenem for listeria bacteremia and concerns of pneumonia  Anticipate a 3 wks course of antibiotics total & today is D # 6/21

## 2020-10-14 ENCOUNTER — TRANSCRIPTION ENCOUNTER (OUTPATIENT)
Age: 70
End: 2020-10-14

## 2020-10-14 LAB
ANION GAP SERPL CALC-SCNC: 5 MMOL/L — SIGNIFICANT CHANGE UP (ref 5–17)
ANION GAP SERPL CALC-SCNC: 8 MMOL/L — SIGNIFICANT CHANGE UP (ref 5–17)
APPEARANCE UR: CLEAR — SIGNIFICANT CHANGE UP
BACTERIA # UR AUTO: NEGATIVE — SIGNIFICANT CHANGE UP
BASE EXCESS BLDA CALC-SCNC: 2.8 MMOL/L — HIGH (ref -2–2)
BILIRUB UR-MCNC: NEGATIVE — SIGNIFICANT CHANGE UP
BUN SERPL-MCNC: 10 MG/DL — SIGNIFICANT CHANGE UP (ref 7–23)
BUN SERPL-MCNC: 9 MG/DL — SIGNIFICANT CHANGE UP (ref 7–23)
CALCIUM SERPL-MCNC: 8.4 MG/DL — SIGNIFICANT CHANGE UP (ref 8.4–10.5)
CALCIUM SERPL-MCNC: 9.1 MG/DL — SIGNIFICANT CHANGE UP (ref 8.4–10.5)
CHLORIDE SERPL-SCNC: 97 MMOL/L — SIGNIFICANT CHANGE UP (ref 96–108)
CHLORIDE SERPL-SCNC: 99 MMOL/L — SIGNIFICANT CHANGE UP (ref 96–108)
CK MB BLD-MCNC: 17.5 % — HIGH (ref 0–3.5)
CK MB CFR SERPL CALC: 4.2 NG/ML — HIGH (ref 0–3.8)
CK SERPL-CCNC: 24 U/L — LOW (ref 25–170)
CO2 BLDA-SCNC: 28 MMOL/L — SIGNIFICANT CHANGE UP (ref 22–30)
CO2 SERPL-SCNC: 28 MMOL/L — SIGNIFICANT CHANGE UP (ref 22–31)
CO2 SERPL-SCNC: 28 MMOL/L — SIGNIFICANT CHANGE UP (ref 22–31)
COLOR SPEC: SIGNIFICANT CHANGE UP
CREAT SERPL-MCNC: <0.3 MG/DL — LOW (ref 0.5–1.3)
CREAT SERPL-MCNC: <0.3 MG/DL — LOW (ref 0.5–1.3)
CULTURE RESULTS: SIGNIFICANT CHANGE UP
CULTURE RESULTS: SIGNIFICANT CHANGE UP
DIFF PNL FLD: NEGATIVE — SIGNIFICANT CHANGE UP
EPI CELLS # UR: 2 /HPF — SIGNIFICANT CHANGE UP
GAS PNL BLDA: SIGNIFICANT CHANGE UP
GLUCOSE BLDC GLUCOMTR-MCNC: 102 MG/DL — HIGH (ref 70–99)
GLUCOSE BLDC GLUCOMTR-MCNC: 127 MG/DL — HIGH (ref 70–99)
GLUCOSE BLDC GLUCOMTR-MCNC: 129 MG/DL — HIGH (ref 70–99)
GLUCOSE BLDC GLUCOMTR-MCNC: 153 MG/DL — HIGH (ref 70–99)
GLUCOSE BLDC GLUCOMTR-MCNC: 167 MG/DL — HIGH (ref 70–99)
GLUCOSE SERPL-MCNC: 109 MG/DL — HIGH (ref 70–99)
GLUCOSE SERPL-MCNC: 130 MG/DL — HIGH (ref 70–99)
GLUCOSE UR QL: NEGATIVE — SIGNIFICANT CHANGE UP
HCO3 BLDA-SCNC: 27 MMOL/L — SIGNIFICANT CHANGE UP (ref 21–29)
HCT VFR BLD CALC: 24.5 % — LOW (ref 34.5–45)
HCT VFR BLD CALC: 28.2 % — LOW (ref 34.5–45)
HGB BLD-MCNC: 7.4 G/DL — LOW (ref 11.5–15.5)
HGB BLD-MCNC: 8.1 G/DL — LOW (ref 11.5–15.5)
HYALINE CASTS # UR AUTO: 1 /LPF — SIGNIFICANT CHANGE UP (ref 0–2)
KETONES UR-MCNC: SIGNIFICANT CHANGE UP
LEUKOCYTE ESTERASE UR-ACNC: NEGATIVE — SIGNIFICANT CHANGE UP
MCHC RBC-ENTMCNC: 24.4 PG — LOW (ref 27–34)
MCHC RBC-ENTMCNC: 25 PG — LOW (ref 27–34)
MCHC RBC-ENTMCNC: 28.7 GM/DL — LOW (ref 32–36)
MCHC RBC-ENTMCNC: 30.2 GM/DL — LOW (ref 32–36)
MCV RBC AUTO: 82.8 FL — SIGNIFICANT CHANGE UP (ref 80–100)
MCV RBC AUTO: 84.9 FL — SIGNIFICANT CHANGE UP (ref 80–100)
NITRITE UR-MCNC: NEGATIVE — SIGNIFICANT CHANGE UP
NRBC # BLD: 1 /100 WBCS — HIGH (ref 0–0)
NRBC # BLD: 2 /100 WBCS — HIGH (ref 0–0)
PCO2 BLDA: 39 MMHG — SIGNIFICANT CHANGE UP (ref 32–46)
PH BLDA: 7.44 — SIGNIFICANT CHANGE UP (ref 7.35–7.45)
PH UR: 8.5 — HIGH (ref 5–8)
PHOSPHATE SERPL-MCNC: 2.7 MG/DL — SIGNIFICANT CHANGE UP (ref 2.5–4.5)
PLATELET # BLD AUTO: 177 K/UL — SIGNIFICANT CHANGE UP (ref 150–400)
PLATELET # BLD AUTO: 218 K/UL — SIGNIFICANT CHANGE UP (ref 150–400)
PO2 BLDA: 150 MMHG — HIGH (ref 74–108)
POTASSIUM SERPL-MCNC: 4 MMOL/L — SIGNIFICANT CHANGE UP (ref 3.5–5.3)
POTASSIUM SERPL-MCNC: 4.2 MMOL/L — SIGNIFICANT CHANGE UP (ref 3.5–5.3)
POTASSIUM SERPL-SCNC: 4 MMOL/L — SIGNIFICANT CHANGE UP (ref 3.5–5.3)
POTASSIUM SERPL-SCNC: 4.2 MMOL/L — SIGNIFICANT CHANGE UP (ref 3.5–5.3)
PROT UR-MCNC: SIGNIFICANT CHANGE UP
RBC # BLD: 2.96 M/UL — LOW (ref 3.8–5.2)
RBC # BLD: 3.32 M/UL — LOW (ref 3.8–5.2)
RBC # FLD: 16.3 % — HIGH (ref 10.3–14.5)
RBC # FLD: 16.5 % — HIGH (ref 10.3–14.5)
RBC CASTS # UR COMP ASSIST: 3 /HPF — SIGNIFICANT CHANGE UP (ref 0–4)
SAO2 % BLDA: 100 % — HIGH (ref 92–96)
SODIUM SERPL-SCNC: 132 MMOL/L — LOW (ref 135–145)
SODIUM SERPL-SCNC: 133 MMOL/L — LOW (ref 135–145)
SP GR SPEC: 1.01 — SIGNIFICANT CHANGE UP (ref 1.01–1.02)
SPECIMEN SOURCE: SIGNIFICANT CHANGE UP
SPECIMEN SOURCE: SIGNIFICANT CHANGE UP
TROPONIN T, HIGH SENSITIVITY RESULT: 78 NG/L — HIGH (ref 0–51)
UROBILINOGEN FLD QL: NEGATIVE — SIGNIFICANT CHANGE UP
WBC # BLD: 5.16 K/UL — SIGNIFICANT CHANGE UP (ref 3.8–10.5)
WBC # BLD: 6.06 K/UL — SIGNIFICANT CHANGE UP (ref 3.8–10.5)
WBC # FLD AUTO: 5.16 K/UL — SIGNIFICANT CHANGE UP (ref 3.8–10.5)
WBC # FLD AUTO: 6.06 K/UL — SIGNIFICANT CHANGE UP (ref 3.8–10.5)
WBC UR QL: 1 /HPF — SIGNIFICANT CHANGE UP (ref 0–5)

## 2020-10-14 PROCEDURE — 71275 CT ANGIOGRAPHY CHEST: CPT | Mod: 26

## 2020-10-14 PROCEDURE — 93010 ELECTROCARDIOGRAM REPORT: CPT

## 2020-10-14 PROCEDURE — 71045 X-RAY EXAM CHEST 1 VIEW: CPT | Mod: 26

## 2020-10-14 PROCEDURE — 70450 CT HEAD/BRAIN W/O DYE: CPT | Mod: 26

## 2020-10-14 PROCEDURE — 99231 SBSQ HOSP IP/OBS SF/LOW 25: CPT | Mod: GC

## 2020-10-14 RX ORDER — CHLORHEXIDINE GLUCONATE 213 G/1000ML
1 SOLUTION TOPICAL DAILY
Refills: 0 | Status: DISCONTINUED | OUTPATIENT
Start: 2020-10-14 | End: 2020-10-23

## 2020-10-14 RX ORDER — ONDANSETRON 8 MG/1
4 TABLET, FILM COATED ORAL ONCE
Refills: 0 | Status: COMPLETED | OUTPATIENT
Start: 2020-10-14 | End: 2020-10-14

## 2020-10-14 RX ADMIN — Medication 1 DROP(S): at 17:29

## 2020-10-14 RX ADMIN — CYCLOSPORINE 1 DROP(S): 0.5 EMULSION OPHTHALMIC at 05:23

## 2020-10-14 RX ADMIN — MEROPENEM 200 MILLIGRAM(S): 1 INJECTION INTRAVENOUS at 05:24

## 2020-10-14 RX ADMIN — Medication 1 DROP(S): at 05:22

## 2020-10-14 RX ADMIN — Medication 200 MILLIGRAM(S): at 05:23

## 2020-10-14 RX ADMIN — Medication 75 MICROGRAM(S): at 22:36

## 2020-10-14 RX ADMIN — CYCLOSPORINE 1 DROP(S): 0.5 EMULSION OPHTHALMIC at 17:29

## 2020-10-14 RX ADMIN — CHLORHEXIDINE GLUCONATE 1 APPLICATION(S): 213 SOLUTION TOPICAL at 12:40

## 2020-10-14 RX ADMIN — MEROPENEM 200 MILLIGRAM(S): 1 INJECTION INTRAVENOUS at 22:35

## 2020-10-14 RX ADMIN — FONDAPARINUX SODIUM 2.5 MILLIGRAM(S): 2.5 INJECTION, SOLUTION SUBCUTANEOUS at 12:40

## 2020-10-14 RX ADMIN — Medication 200 MILLIGRAM(S): at 12:40

## 2020-10-14 RX ADMIN — Medication 5 MILLIGRAM(S): at 05:23

## 2020-10-14 RX ADMIN — SENNA PLUS 2 TABLET(S): 8.6 TABLET ORAL at 22:36

## 2020-10-14 RX ADMIN — MEROPENEM 200 MILLIGRAM(S): 1 INJECTION INTRAVENOUS at 13:58

## 2020-10-14 RX ADMIN — LISINOPRIL 20 MILLIGRAM(S): 2.5 TABLET ORAL at 05:23

## 2020-10-14 RX ADMIN — Medication 1: at 09:11

## 2020-10-14 RX ADMIN — ONDANSETRON 4 MILLIGRAM(S): 8 TABLET, FILM COATED ORAL at 19:17

## 2020-10-14 RX ADMIN — Medication 1 DROP(S): at 05:23

## 2020-10-14 RX ADMIN — Medication 1: at 12:42

## 2020-10-14 RX ADMIN — POLYETHYLENE GLYCOL 3350 17 GRAM(S): 17 POWDER, FOR SOLUTION ORAL at 12:40

## 2020-10-14 NOTE — PROGRESS NOTE ADULT - SUBJECTIVE AND OBJECTIVE BOX
Patient is a 70y old  Female who presents with a chief complaint of SOB (09 Oct 2020 12:09)      SUBJECTIVE / OVERNIGHT EVENTS:  Events noted  Hypotensive, reports ruth CP, no SOB  dizziness with nausea  Review of Systems:       MEDICATIONS  (STANDING):  artificial  tears Solution 1 Drop(s) Both EYES two times a day  cycloSPORINE (RESTASIS) 0.05% Emulsion 1 Drop(s) Both EYES two times a day  dextrose 5%. 1000 milliLiter(s) (50 mL/Hr) IV Continuous <Continuous>  dextrose 50% Injectable 12.5 Gram(s) IV Push once  dextrose 50% Injectable 25 Gram(s) IV Push once  dextrose 50% Injectable 25 Gram(s) IV Push once  insulin lispro (HumaLOG) corrective regimen sliding scale   SubCutaneous every 6 hours  levothyroxine Injectable 50 MICROGram(s) IV Push at bedtime  lisinopril 20 milliGRAM(s) Oral daily  meropenem  IVPB 2000 milliGRAM(s) IV Intermittent every 8 hours  methylPREDNISolone sodium succinate Injectable 8 milliGRAM(s) IV Push daily  potassium phosphate / sodium phosphate Powder (PHOS-NaK) 1 Packet(s) Oral two times a day before meals  prednisoLONE acetate 1% Suspension 1 Drop(s) Both EYES two times a day    MEDICATIONS  (PRN):  dextrose 40% Gel 15 Gram(s) Oral once PRN Blood Glucose LESS THAN 70 milliGRAM(s)/deciliter  glucagon  Injectable 1 milliGRAM(s) IntraMuscular once PRN Glucose LESS THAN 70 milligrams/deciliter  hydrALAZINE Injectable 5 milliGRAM(s) IV Push every 6 hours PRN SBP >170      PHYSICAL EXAM:  Vital Signs Last 24 Hrs  T(C): 36.6 (09 Oct 2020 20:00), Max: 37 (09 Oct 2020 04:00)  T(F): 97.9 (09 Oct 2020 20:00), Max: 98.6 (09 Oct 2020 04:00)  HR: 88 (09 Oct 2020 22:00) (69 - 124)  BP: 156/71 (09 Oct 2020 22:00) (116/58 - 210/99)  BP(mean): 102 (09 Oct 2020 22:00) (79 - 142)  RR: 16 (09 Oct 2020 22:00) (15 - 46)  SpO2: 98% (09 Oct 2020 22:00) (93% - 100%)  I&O's Summary    08 Oct 2020 07:01  -  09 Oct 2020 07:00  --------------------------------------------------------  IN: 491.4 mL / OUT: 640 mL / NET: -148.6 mL    09 Oct 2020 07:01  -  09 Oct 2020 22:55  --------------------------------------------------------  IN: 2670 mL / OUT: 300 mL / NET: 2370 mL      GENERAL:On a vent  HEAD:  Atraumatic, Normocephalic  EYES: EOMI, PERRLA, conjunctiva and sclera clear  NECK: Supple, No JVD  CHEST/LUNG: Clear to auscultation bilaterally; No wheeze  HEART: Regular rate and rhythm; No murmurs, rubs, or gallops  ABDOMEN: Soft, Nontender, Nondistended; Bowel sounds present  EXTREMITIES:  2+ Peripheral Pulses, No clubbing, cyanosis, or edema  PSYCH: AAOx3  NEUROLOGY: non-focal  SKIN: No rashes or lesions    LABS:  CAPILLARY BLOOD GLUCOSE      POCT Blood Glucose.: 239 mg/dL (09 Oct 2020 18:44)  POCT Blood Glucose.: 104 mg/dL (09 Oct 2020 05:11)  POCT Blood Glucose.: 151 mg/dL (08 Oct 2020 23:13)                          8.1    5.73  )-----------( 140      ( 09 Oct 2020 12:44 )             25.6     10-09    131<L>  |  101  |  10  ----------------------------<  96  3.6   |  16<L>  |  <0.30<L>    Ca    8.8      09 Oct 2020 12:44  Phos  2.1     10-09  Mg     1.6     10-09    TPro  6.0  /  Alb  2.9<L>  /  TBili  0.4  /  DBili  x   /  AST  38  /  ALT  57<H>  /  AlkPhos  41  10-09    PT/INR - ( 09 Oct 2020 00:21 )   PT: 13.6 sec;   INR: 1.14 ratio         PTT - ( 09 Oct 2020 00:21 )  PTT:30.2 sec          RADIOLOGY & ADDITIONAL TESTS:    Imaging Personally Reviewed:    Consultant(s) Notes Reviewed:      Care Discussed with Consultants/Other Providers:

## 2020-10-14 NOTE — PROGRESS NOTE ADULT - SUBJECTIVE AND OBJECTIVE BOX
CC: f/u for listeria bacteremia    Patient reports: she is now being given fluids for hypotension after labetolol    REVIEW OF SYSTEMS:  All other review of systems negative (Comprehensive ROS)    Antimicrobials Day #  :day 7/21  meropenem  IVPB 2000 milliGRAM(s) IV Intermittent every 8 hours    Other Medications Reviewed  MEDICATIONS  (STANDING):  artificial  tears Solution 1 Drop(s) Both EYES two times a day  chlorhexidine 2% Cloths 1 Application(s) Topical daily  cycloSPORINE (RESTASIS) 0.05% Emulsion 1 Drop(s) Both EYES two times a day  dextrose 5%. 1000 milliLiter(s) (50 mL/Hr) IV Continuous <Continuous>  dextrose 50% Injectable 12.5 Gram(s) IV Push once  dextrose 50% Injectable 25 Gram(s) IV Push once  dextrose 50% Injectable 25 Gram(s) IV Push once  fondaparinux Injectable 2.5 milliGRAM(s) SubCutaneous daily  insulin lispro (HumaLOG) corrective regimen sliding scale   SubCutaneous three times a day before meals  insulin lispro (HumaLOG) corrective regimen sliding scale   SubCutaneous at bedtime  levothyroxine Injectable 75 MICROGram(s) IV Push at bedtime  lisinopril 20 milliGRAM(s) Oral daily  meropenem  IVPB 2000 milliGRAM(s) IV Intermittent every 8 hours  polyethylene glycol 3350 17 Gram(s) Oral daily  prednisoLONE acetate 1% Suspension 1 Drop(s) Both EYES two times a day  predniSONE   Tablet 5 milliGRAM(s) Oral daily  senna 2 Tablet(s) Oral at bedtime    T(F): 98.8 (10-14-20 @ 12:38), Max: 98.8 (10-14-20 @ 12:38)  HR: 73 (10-14-20 @ 12:38)  BP: 95/57 (10-14-20 @ 14:08)  RR: 18 (10-14-20 @ 12:38)  SpO2: 95% (10-14-20 @ 12:38)  Wt(kg): --    PHYSICAL EXAM:  General: alert, in trendelenberg  Eyes:  anicteric, no conjunctival injection, no discharge  Oropharynx: no lesions or injection 	  Neck: supple, without adenopathy  Lungs: clear to auscultation  Heart: regular rate and rhythm; no murmur, rubs or gallops  Abdomen: soft, nondistended, nontender, without mass or organomegaly  Skin: no lesions  Extremities: no clubbing, cyanosis, + edema  Neurologic: alert, oriented, moves all extremities    LAB RESULTS:                        7.4    5.16  )-----------( 177      ( 14 Oct 2020 14:32 )             24.5     10-14    133<L>  |  97  |  10  ----------------------------<  109<H>  4.2   |  28  |  <0.30<L>    Ca    9.1      14 Oct 2020 06:54  Phos  2.7     10-14  Mg     2.1     10-13    TPro  5.8<L>  /  Alb  3.1<L>  /  TBili  0.4  /  DBili  x   /  AST  17  /  ALT  31  /  AlkPhos  57  10-13    LIVER FUNCTIONS - ( 13 Oct 2020 06:47 )  Alb: 3.1 g/dL / Pro: 5.8 g/dL / ALK PHOS: 57 U/L / ALT: 31 U/L / AST: 17 U/L / GGT: x             MICROBIOLOGY:  RECENT CULTURES:      RADIOLOGY REVIEWED:  r  < from: Xray Chest 1 View- PORTABLE-Urgent (Xray Chest 1 View- PORTABLE-Urgent .) (10.12.20 @ 19:16) >  PROCEDURE DATE:  10/12/2020            INTERPRETATION:  CLINICAL INFORMATION: Right PICC placement.    EXAM: Frontal radiographs of the chest at 6:03 and 7:06 PM    COMPARISON: Chest x-ray, CT chest 10/7/2020.    IMPRESSION:    Interval removal of ET and enteric tube. Interval placement of right PICC with tip in the lower portion of the right atrium.    Bibasilar linear atelectasis, unchanged. Small left pleural effusion. No pneumothorax.

## 2020-10-14 NOTE — PROGRESS NOTE ADULT - SUBJECTIVE AND OBJECTIVE BOX
Follow-up Pulm Progress Note    No new respiratory events overnight.  Denies SOB/CP.   Sats 97% RA    Medications:  MEDICATIONS  (STANDING):  artificial  tears Solution 1 Drop(s) Both EYES two times a day  chlorhexidine 2% Cloths 1 Application(s) Topical daily  cycloSPORINE (RESTASIS) 0.05% Emulsion 1 Drop(s) Both EYES two times a day  dextrose 5%. 1000 milliLiter(s) (50 mL/Hr) IV Continuous <Continuous>  dextrose 50% Injectable 12.5 Gram(s) IV Push once  dextrose 50% Injectable 25 Gram(s) IV Push once  dextrose 50% Injectable 25 Gram(s) IV Push once  fondaparinux Injectable 2.5 milliGRAM(s) SubCutaneous daily  insulin lispro (HumaLOG) corrective regimen sliding scale   SubCutaneous three times a day before meals  insulin lispro (HumaLOG) corrective regimen sliding scale   SubCutaneous at bedtime  labetalol 200 milliGRAM(s) Oral three times a day  levothyroxine Injectable 75 MICROGram(s) IV Push at bedtime  lisinopril 20 milliGRAM(s) Oral daily  meropenem  IVPB 2000 milliGRAM(s) IV Intermittent every 8 hours  polyethylene glycol 3350 17 Gram(s) Oral daily  prednisoLONE acetate 1% Suspension 1 Drop(s) Both EYES two times a day  predniSONE   Tablet 5 milliGRAM(s) Oral daily  senna 2 Tablet(s) Oral at bedtime    MEDICATIONS  (PRN):  dextrose 40% Gel 15 Gram(s) Oral once PRN Blood Glucose LESS THAN 70 milliGRAM(s)/deciliter  glucagon  Injectable 1 milliGRAM(s) IntraMuscular once PRN Glucose LESS THAN 70 milligrams/deciliter  hydrALAZINE Injectable 5 milliGRAM(s) IV Push every 6 hours PRN SBP >170  simethicone 80 milliGRAM(s) Chew three times a day PRN Gas          Vital Signs Last 24 Hrs  T(C): 36.9 (14 Oct 2020 05:01), Max: 37.1 (13 Oct 2020 19:26)  T(F): 98.4 (14 Oct 2020 05:01), Max: 98.7 (13 Oct 2020 19:26)  HR: 74 (14 Oct 2020 05:01) (68 - 75)  BP: 142/78 (14 Oct 2020 05:01) (120/70 - 142/78)  BP(mean): --  RR: 17 (14 Oct 2020 05:01) (17 - 18)  SpO2: 97% (14 Oct 2020 05:01) (96% - 98%)          10-13 @ 07:01  -  10-14 @ 07:00  --------------------------------------------------------  IN: 1200 mL / OUT: 800 mL / NET: 400 mL          LABS:                        8.1    6.06  )-----------( 218      ( 14 Oct 2020 06:54 )             28.2     10-14    133<L>  |  97  |  10  ----------------------------<  109<H>  4.2   |  28  |  <0.30<L>    Ca    9.1      14 Oct 2020 06:54  Phos  2.7     10-14  Mg     2.1     10-13    TPro  5.8<L>  /  Alb  3.1<L>  /  TBili  0.4  /  DBili  x   /  AST  17  /  ALT  31  /  AlkPhos  57  10-13          CAPILLARY BLOOD GLUCOSE      POCT Blood Glucose.: 167 mg/dL (14 Oct 2020 09:04)          CULTURES:    Culture - Blood (collected 10-09-20 @ 02:19)  Source: .Blood Blood-Venous  Final Report (10-14-20 @ 03:01):    No Growth Final    Culture - Blood (collected 10-09-20 @ 02:19)  Source: .Blood Blood-Peripheral  Final Report (10-14-20 @ 03:01):    No Growth Final    Culture - Blood (collected 10-07-20 @ 15:07)  Source: .Blood Blood-Peripheral  Gram Stain (10-08-20 @ 13:24):    Growth in anaerobic bottle: Gram Positive Rods    Growth in aerobic bottle: Gram Positive Rods  Final Report (10-10-20 @ 10:12):    Listeria monocytogenes    Resistance to ampicillin or penicillin for Listeria    monocytogenes has not been described. L. monocytogenes is intrinsically    resistant to cephalosporins.    "Due to technical problems, Proteus sp. will Not be reported as partof    the BCID panel until further notice"    ***Blood Panel PCR results on this specimen are available    approximately 3 hours after the Gram stain result.***    Gram stain, PCR, and/or culture results may not always    correspond due to difference in methodologies.    ************************************************************    This PCR assay was performed using [employer].    The following targets are tested for: Enterococcus,    vancomycin resistant enterococci, Listeria monocytogenes,    coagulase negative staphylococci, S. aureus,    methicillin resistant S. aureus, Streptococcus agalactiae    (Group B), S. pneumoniae, S. pyogenes (Group A),    Acinetobacter baumannii, Enterobacter cloacae, E. coli,    Klebsiella oxytoca, K. pneumoniae, Proteus sp.,    Serratia marcescens, Haemophilus influenzae,    Neisseria meningitidis, Pseudomonas aeruginosa, Candida    albicans, C. glabrata, C krusei, C parapsilosis,    C. tropicalis and the KPC resistance gene.  Organism: Blood Culture PCR (10-10-20 @ 10:12)  Organism: Blood Culture PCR (10-10-20 @ 10:12)      -  Listeria monocytogenes: Detec      Method Type: PCR    Culture - Blood (collected 10-07-20 @ 15:07)  Source: .Blood Blood-Peripheral  Gram Stain (10-08-20 @ 21:16):    Growth in anaerobic bottle: Gram Positive Rods    Growth in aerobic bottle: Gram Positive Rods  Final Report (10-09-20 @ 11:03):    Growth in aerobic and anaerobic bottles: Listeria monocytogenes    Resistance to ampicillin or penicillin for Listeria    monocytogenes has not been described. L. monocytogenes is intrinsically    resistant to cephalosporins.        Culture - Urine (collected 10-07-20 @ 15:26)  Source: .Urine Clean Catch (Midstream)  Final Report (10-09-20 @ 08:38):    >=3 organisms. Probable collection contamination.          Physical Examination:  PULM: Clear to auscultation bilaterally, no significant sputum production  CVS: RRR    RADIOLOGY REVIEWED  CXR 10/12: L pl effusion   bibasilar atelectasis

## 2020-10-14 NOTE — CONSULT NOTE ADULT - ASSESSMENT
This is a 70/F w/ RA, DM, HTN, fibromyalgia, hypothyroidism, brain aneurysm s/p repair BIBEMS for AMS.  Found in respiratory distress likely 2/2 multifocal PNA, intubated for hypoxemic respiratory failure.  Extubated in MICU w/o complication.  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  MICU course c/b hypertension.  Now transferred to [facility].  MICU consulted for hypotension.    #Hypotension  - symptomatic, onset after lunch  - w/o reflex tachycardia, likely 2/2 labetalol 200 given at 12:40.    - other VSS, no hypoglycemia  - BP recovered to 91/56 s/p 250cc IVF  - please give additional 250cc of IVF, recheck vitals  - agree w/ transferring to floor w/ continuous telemetry  - agree with discontinuing labetalol  - agree w/ continuing home antihypertensives once BP recovers  - NOT a candidate for MICU level of care at this time, can reconsult if clinical condition changes    Greg Chau MD  [facility]  Internal Medicine, PGY2  [phone] This is a 70/F w/ RA, DM, HTN, fibromyalgia, hypothyroidism, brain aneurysm s/p repair BIBEMS for AMS.  Found in respiratory distress likely 2/2 multifocal PNA, intubated for hypoxemic respiratory failure.  Extubated in MICU w/o complication.  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  MICU course c/b hypertension.  Now transferred to [facility].  MICU consulted for hypotension.    #Hypotension  - symptomatic, onset after lunch  - w/o reflex tachycardia, likely 2/2 labetalol 200 given at 12:40.    - other VSS, no hypoglycemia  - BP recovered to 91/56 s/p 250cc IVF  - please give additional 250cc of IVF, recheck vitals  - agree w/ transferring to floor w/ continuous telemetry  - agree with discontinuing labetalol  - agree w/ continuing home antihypertensives with appropriate hold parameters  - NOT a candidate for MICU level of care at this time, can reconsult if clinical condition changes    Greg Chau MD  [facility]  Internal Medicine, PGY2  [phone]

## 2020-10-14 NOTE — DISCHARGE NOTE NURSING/CASE MANAGEMENT/SOCIAL WORK - NSDCPEWEB_GEN_ALL_CORE
[facility] for Tobacco Control website --- [email or web address] website --- [email or web address]

## 2020-10-14 NOTE — DISCHARGE NOTE NURSING/CASE MANAGEMENT/SOCIAL WORK - PATIENT PORTAL LINK FT
You can access the FollowMyHealth Patient Portal offered by [facility] by registering at the following website: [email or web address]. By joining [employer]’s FollowMyHealth portal, you will also be able to view your health information using other applications (apps) compatible with our system.

## 2020-10-14 NOTE — DISCHARGE NOTE NURSING/CASE MANAGEMENT/SOCIAL WORK - NSDCPEPTSTRK_GEN_ALL_CORE
Stroke education booklet/Risk factors for stroke/Stroke support groups for patients, families, and friends/Stroke warning signs and symptoms/Signs and symptoms of stroke/Call 911 for stroke/Need for follow up after discharge/Prescribed medications

## 2020-10-14 NOTE — CONSULT NOTE ADULT - SUBJECTIVE AND OBJECTIVE BOX
CHIEF COMPLAINT:    HPI:  Patient is a 71 yo F with history of RA, DM, HTN, fibromyalgia, hypothyroidism, brain aneurysm s/p repair BIBEMS for shortness of breath and fever. Per EMS, patient has had 3 days of fever, Tmax 102.5 this morning 7-8 AM, no known COVID exposures, took tylenol at unknown time before being brought in. Symptoms started on Monday. Per EMS, vitals on scene were 114/70, RA 68 improved NRB to 100%. She also has not taken her meds in 2 days. Patient c/o feeling fatigued, short of breath. Denies chest pain. Patient uses a wheelchair at baseline 2/2 arthritis.  Seen with resp distress, hypoxia and tachycardia in ED (07 Oct 2020 13:58)    MICU Course:   Patient is a 71 yo F with history of RA, DM, HTN, fibromyalgia, hypothyroidism, brain aneurysm s/p repair BIBEMS for shortness of breath and fever. Pt was placed on BIPAP in ED, and continued to be hypoxic with AMS. Pt was intubated in ED 10/7 for acute hypoxemic respiratory failure 2/2 multifocal PNA vs atelectasis      Initially required levophed in ED, but was weaned off upon arrival to MICU. Pt was started on vanco, ceftriaxone, and azithromycin. COVID and RVP were negative. Urine legionella was negative. Blood cultures grew listeria and meropenem was started due to penicillin allergy (bad hives). Other abx discontinued. Pt was extubated on 10/9 at 5am.  Mental status improved. Pt passed bedside swallow and was tolerating PO with regular diet. Course c/b hypertension, started on home antihypertensives.  Labetalol added for additional bp control.      On [facility], pt continued w/ abx.  MICU consulted for hypotension.       PAST MEDICAL & SURGICAL HISTORY:  Hypothyroid  Fibromyalgia  Rheumatoid arthritis  Diabetes    FAMILY HISTORY:    SOCIAL HISTORY:  Smoking: __ packs x ___ years  EtOH Use:  Marital Status:  Occupation:  Recent Travel:  Country of Birth:  Advance Directives:    Allergies    heparin (Unknown)  Lyrica (Unknown)  penicillin (Unknown)  pineapple (Unknown)  Tagamet (Unknown)  walnut (Unknown)    Intolerances    HOME MEDICATIONS:    REVIEW OF SYSTEMS:  Constitutional: denies fevers, chills, weight loss, weight gain  HEENT: denies vision problems, eye pain, nasal congestion, rhinorrhea, sore throat, dysphagia  CV: denies chest pain, orthopnea, palpitations  Resp: denies cough, dyspnea, wheezing, hemoptysis  GI: +abdominal pain; denies nausea, vomiting, diarrhea, constipation  : denies burning w/ urination, urinary frequency, incontinence, bloody urine  Musculoskeletal: denies back pain, joint pain, muscle ache  Skin: denies rash or itching  Neurological: +lightheaded; denies headache, syncope, weakness, numbness  Psychiatric: denies anxiety, depression, hallucination  Endocrine: denies heat/cold intolerance  Hematologic/Lymphatic: denies easy bruising or bleeding, denies swollen LN  [ ] All other systems negative  [ ] Unable to assess ROS because ________    OBJECTIVE:  ICU Vital Signs Last 24 Hrs  T(C): 37.1 (14 Oct 2020 12:38), Max: 37.1 (13 Oct 2020 19:26)  T(F): 98.8 (14 Oct 2020 12:38), Max: 98.8 (14 Oct 2020 12:38)  HR: 73 (14 Oct 2020 12:38) (68 - 75)  BP: 96/61 (14 Oct 2020 15:11) (95/57 - 142/78)  BP(mean): --  ABP: --  ABP(mean): --  RR: 18 (14 Oct 2020 12:38) (17 - 18)  SpO2: 95% (14 Oct 2020 12:38) (95% - 97%)        10-13 @ 07:01  -  10-14 @ 07:00  --------------------------------------------------------  IN: 1200 mL / OUT: 800 mL / NET: 400 mL    10-14 @ 07:01  -  10-14 @ 15:27  --------------------------------------------------------  IN: 240 mL / OUT: 600 mL / NET: -360 mL      CAPILLARY BLOOD GLUCOSE    POCT Blood Glucose.: 129 mg/dL (14 Oct 2020 14:09)    PHYSICAL EXAM:  General: resting in bed, NAD  HEENT: atraumatic, normocephalic;  Neck: supple, no LAD, no thyromegaly  Respiratory: CTABL, not using accessory muscles  Cardiovascular: RRR, S1, S2, no M/R/G  Abdomen: normal BS; soft, non-distended, non-tender; no R/G  Extremities: radial and DP pulses intact b/l; no clubbing, cyanosis;  Skin: no rash appreciated;  Neurological: non-focal  Psychiatry: normal affect    HOSPITAL MEDICATIONS:  MEDICATIONS  (STANDING):  artificial  tears Solution 1 Drop(s) Both EYES two times a day  chlorhexidine 2% Cloths 1 Application(s) Topical daily  cycloSPORINE (RESTASIS) 0.05% Emulsion 1 Drop(s) Both EYES two times a day  dextrose 5%. 1000 milliLiter(s) (50 mL/Hr) IV Continuous <Continuous>  dextrose 50% Injectable 12.5 Gram(s) IV Push once  dextrose 50% Injectable 25 Gram(s) IV Push once  dextrose 50% Injectable 25 Gram(s) IV Push once  fondaparinux Injectable 2.5 milliGRAM(s) SubCutaneous daily  insulin lispro (HumaLOG) corrective regimen sliding scale   SubCutaneous three times a day before meals  insulin lispro (HumaLOG) corrective regimen sliding scale   SubCutaneous at bedtime  levothyroxine Injectable 75 MICROGram(s) IV Push at bedtime  lisinopril 20 milliGRAM(s) Oral daily  meropenem  IVPB 2000 milliGRAM(s) IV Intermittent every 8 hours  polyethylene glycol 3350 17 Gram(s) Oral daily  prednisoLONE acetate 1% Suspension 1 Drop(s) Both EYES two times a day  predniSONE   Tablet 5 milliGRAM(s) Oral daily  senna 2 Tablet(s) Oral at bedtime    MEDICATIONS  (PRN):  dextrose 40% Gel 15 Gram(s) Oral once PRN Blood Glucose LESS THAN 70 milliGRAM(s)/deciliter  glucagon  Injectable 1 milliGRAM(s) IntraMuscular once PRN Glucose LESS THAN 70 milligrams/deciliter  hydrALAZINE Injectable 5 milliGRAM(s) IV Push every 6 hours PRN SBP >170  simethicone 80 milliGRAM(s) Chew three times a day PRN Gas      LABS:                        7.4    5.16  )-----------( 177      ( 14 Oct 2020 14:32 )             24.5     10-14    132<L>  |  99  |  9   ----------------------------<  130<H>  4.0   |  28  |  <0.30<L>    Ca    8.4      14 Oct 2020 14:32  Phos  2.7     10-14  Mg     2.1     10-13    TPro  5.8<L>  /  Alb  3.1<L>  /  TBili  0.4  /  DBili  x   /  AST  17  /  ALT  31  /  AlkPhos  57  10-13    Arterial Blood Gas:  10-14 @ 14:32  7.44/39/150/27/100/2.8  ABG lactate: --    MICROBIOLOGY:     RADIOLOGY:  [ ] Reviewed and interpreted by me    EKG: NSR

## 2020-10-14 NOTE — PROGRESS NOTE ADULT - ASSESSMENT
72 yo female with RA, DM, HTN, fibromyalgia, immune suppressed at home on Enbrel and 5 mg of prednisone bid  and remote repair of cerebral aneurysm now admitted with fever, weakness and hypoxia.  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  Required vent & pressors support in ICU for shock - shock resolved.   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  She recalls a rash to PCN, therefor is on meropenem which she appears to be tolerating  RVP is negative, MRSA screen positive.  TTE with mild AS, no vegetations.  Repeat 10/9 blood cultures are NG to date  She had developed pancytopenia, now improved.  S/p RUE PICC placement   Todays hypotension is likely medication related although she was 100.4, she was pancultured  Suggest:  Continue meropenem for listeria bacteremia and concerns of pneumonia  Anticipate a 3 wks course of antibiotics total & today is D # 7/21  Await f/u cultures, stabilization of BP  If she has any intolerance to meropenem we could use bactrim to complete course

## 2020-10-14 NOTE — CHART NOTE - NSCHARTNOTEFT_GEN_A_CORE
Medicine NP note     Vital Signs Last 24 Hrs  T(C): 37.1 (14 Oct 2020 12:38), Max: 37.1 (13 Oct 2020 19:26)  T(F): 98.8 (14 Oct 2020 12:38), Max: 98.8 (14 Oct 2020 12:38)  HR: 73 (14 Oct 2020 12:38) (68 - 75)  BP: 96/61 (14 Oct 2020 15:11) (95/57 - 142/78)  BP(mean): --  RR: 18 (14 Oct 2020 12:38) (17 - 18)  SpO2: 95% (14 Oct 2020 12:38) (95% - 97%)    69 y/o F with PMH of RA, DM, HTN, fibromyalgia, hypothyroidism, brain aneurysm. Presents to ED with SOB and fever. Reports 3 days of fever, Tmax 102.5 this morning, no known COVID exposures. Found to be hypoxic by EMS and placed on 100% NRB. CXR with LLL opacity. Seen in ED - as per nurse pt went to CT chest and came back with increased WOB and altered mental status. S/p intubation in ED / extubated 10/9; s/p MICU admit. On 10/10 resp status stable post extubation. Bcxs cleared, remains on Meropenem. Off pressor support, now hypertensive, and added Labetalol TID; Full code. Transferred to 4 D[facility].    On 10/14 pt. had episode of hypotension with vague c/o CP  - described as per pt. "something there". Pt. had temp 100.4 and sepsis work-up completed with EKG, cbc, cmp, cardiac enzymes, blood cx x2, u/a and urine cx sent.  Portable cxr and abg results pending. MICU re-consulted and pt. is vital signs q 4h x 6. Labetalol dc'd.  Per MICU eval. declined and with ACS w/u will transfer to tele. Discussed with Dr. Daniel. Pt. continued with abx. per ID and will wait for blood cx x2 done today.  Per ID c/w current abx. regimen, PICC line in place to RUE and discharge plan after cx. reuslts.      Plan discussed with MICU / Dr. Daniel.    NIKKY Sharma, NP - [facility]  [phone] Medicine NP note     Vital Signs Last 24 Hrs  T(C): 37.1 (14 Oct 2020 12:38), Max: 37.1 (13 Oct 2020 19:26)  T(F): 98.8 (14 Oct 2020 12:38), Max: 98.8 (14 Oct 2020 12:38)  HR: 73 (14 Oct 2020 12:38) (68 - 75)  BP: 96/61 (14 Oct 2020 15:11) (95/57 - 142/78)  BP(mean): --  RR: 18 (14 Oct 2020 12:38) (17 - 18)  SpO2: 95% (14 Oct 2020 12:38) (95% - 97%)    71 y/o F with PMH of RA, DM, HTN, fibromyalgia, hypothyroidism, brain aneurysm. Presents to ED with SOB and fever. Reports 3 days of fever, Tmax 102.5 this morning, no known COVID exposures. Found to be hypoxic by EMS and placed on 100% NRB. CXR with LLL opacity. Seen in ED - as per nurse pt went to CT chest and came back with increased WOB and altered mental status. S/p intubation in ED / extubated 10/9; s/p MICU admit. On 10/10 resp status stable post extubation. Bcxs cleared, remains on Meropenem. Off pressor support, now hypertensive, and added Labetalol TID; Full code. Transferred to 4 DSU.    On 10/14 pt. had episode of hypotension with vague c/o CP  - described as per pt. "something there". Pt. had temp 100.4 and sepsis work-up completed with EKG, cbc, cmp, cardiac enzymes, blood cx x2, u/a and urine cx sent.  Portable cxr and abg results pending. MICU re-consulted and pt. is vital signs q 4h x 6. Labetalol dc'd.  Per MICU eval. declined and with ACS w/u will transfer to tele. Discussed with Dr. Daniel. Pt. continued with abx. per ID and will wait for blood cx x2 done today.  Per ID c/w current abx. regimen, PICC line in place to RUE and discharge plan after cx. reuslts.      Plan discussed with MICU / Dr. Daniel.    NIKKY Sharma, NP - [facility]  [phone]    ADDENDUM  - @1940 transferred to [facility] for tele  - d/w Dr. Daniel and plan for CTA  -?PE if BP is stable   Hand off to night provider - NIKKY Sharma NP - [facility]

## 2020-10-14 NOTE — CONSULT NOTE ADULT - ATTENDING COMMENTS
This is a 70/F w/ RA, DM, HTN, fibromyalgia, hypothyroidism, brain aneurysm s/p repair BIBEMS for AMS.  Found in respiratory distress likely 2/2 multifocal PNA, intubated for hypoxemic respiratory failure.  Extubated in MICU w/o complication.  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  MICU course c/b hypertension.  Now transferred to [facility].  MICU consulted for hypotension. patient is not actually hypotensive relative hypotension also less likely as a cause fo her symptoms as pt has been 110-140 over the last few days.  evaluate cause of nausea, dizziness, and itchy head not in need of pressors. This is a 70/F w/ RA, DM, HTN, fibromyalgia, hypothyroidism, brain aneurysm s/p repair BIBEMS for AMS.  Found in respiratory distress likely 2/2 multifocal PNA, intubated for hypoxemic respiratory failure.  Extubated in MICU w/o complication.  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  MICU course c/b hypertension.  Now transferred to [facility].  MICU consulted for hypotension. patient is not hypotnesive on my exam, fully alert and complaining of nausea, dizziness, and itchy head.  I doubt her symptoms are related to her BP,  evaluate other causes,  not in need of pressors.

## 2020-10-15 DIAGNOSIS — J98.11 ATELECTASIS: ICD-10-CM

## 2020-10-15 LAB
ANION GAP SERPL CALC-SCNC: 9 MMOL/L — SIGNIFICANT CHANGE UP (ref 5–17)
BUN SERPL-MCNC: 7 MG/DL — SIGNIFICANT CHANGE UP (ref 7–23)
CALCIUM SERPL-MCNC: 8.3 MG/DL — LOW (ref 8.4–10.5)
CHLORIDE SERPL-SCNC: 95 MMOL/L — LOW (ref 96–108)
CK MB CFR SERPL CALC: 1.6 NG/ML — SIGNIFICANT CHANGE UP (ref 0–3.8)
CK SERPL-CCNC: 36 U/L — SIGNIFICANT CHANGE UP (ref 25–170)
CO2 SERPL-SCNC: 25 MMOL/L — SIGNIFICANT CHANGE UP (ref 22–31)
CREAT SERPL-MCNC: <0.3 MG/DL — LOW (ref 0.5–1.3)
CULTURE RESULTS: NO GROWTH — SIGNIFICANT CHANGE UP
GLUCOSE BLDC GLUCOMTR-MCNC: 107 MG/DL — HIGH (ref 70–99)
GLUCOSE BLDC GLUCOMTR-MCNC: 117 MG/DL — HIGH (ref 70–99)
GLUCOSE BLDC GLUCOMTR-MCNC: 152 MG/DL — HIGH (ref 70–99)
GLUCOSE BLDC GLUCOMTR-MCNC: 73 MG/DL — SIGNIFICANT CHANGE UP (ref 70–99)
GLUCOSE BLDC GLUCOMTR-MCNC: 77 MG/DL — SIGNIFICANT CHANGE UP (ref 70–99)
GLUCOSE BLDC GLUCOMTR-MCNC: 79 MG/DL — SIGNIFICANT CHANGE UP (ref 70–99)
GLUCOSE BLDC GLUCOMTR-MCNC: 80 MG/DL — SIGNIFICANT CHANGE UP (ref 70–99)
GLUCOSE BLDC GLUCOMTR-MCNC: 86 MG/DL — SIGNIFICANT CHANGE UP (ref 70–99)
GLUCOSE SERPL-MCNC: 101 MG/DL — HIGH (ref 70–99)
HCT VFR BLD CALC: 28 % — LOW (ref 34.5–45)
HGB BLD-MCNC: 8.4 G/DL — LOW (ref 11.5–15.5)
MCHC RBC-ENTMCNC: 25 PG — LOW (ref 27–34)
MCHC RBC-ENTMCNC: 30 GM/DL — LOW (ref 32–36)
MCV RBC AUTO: 83.3 FL — SIGNIFICANT CHANGE UP (ref 80–100)
NRBC # BLD: 1 /100 WBCS — HIGH (ref 0–0)
OSMOLALITY UR: 332 MOS/KG — SIGNIFICANT CHANGE UP (ref 300–900)
PLATELET # BLD AUTO: 203 K/UL — SIGNIFICANT CHANGE UP (ref 150–400)
POTASSIUM SERPL-MCNC: 3.7 MMOL/L — SIGNIFICANT CHANGE UP (ref 3.5–5.3)
POTASSIUM SERPL-SCNC: 3.7 MMOL/L — SIGNIFICANT CHANGE UP (ref 3.5–5.3)
POTASSIUM UR-SCNC: 22 MMOL/L — SIGNIFICANT CHANGE UP
RBC # BLD: 3.36 M/UL — LOW (ref 3.8–5.2)
RBC # FLD: 16.3 % — HIGH (ref 10.3–14.5)
SODIUM SERPL-SCNC: 129 MMOL/L — LOW (ref 135–145)
SODIUM UR-SCNC: 97 MMOL/L — SIGNIFICANT CHANGE UP
SPECIMEN SOURCE: SIGNIFICANT CHANGE UP
TROPONIN T, HIGH SENSITIVITY RESULT: 95 NG/L — HIGH (ref 0–51)
TROPONIN T, HIGH SENSITIVITY RESULT: 97 NG/L — HIGH (ref 0–51)
WBC # BLD: 7.2 K/UL — SIGNIFICANT CHANGE UP (ref 3.8–10.5)
WBC # FLD AUTO: 7.2 K/UL — SIGNIFICANT CHANGE UP (ref 3.8–10.5)

## 2020-10-15 PROCEDURE — 70498 CT ANGIOGRAPHY NECK: CPT | Mod: 26

## 2020-10-15 PROCEDURE — 93010 ELECTROCARDIOGRAM REPORT: CPT

## 2020-10-15 PROCEDURE — 70496 CT ANGIOGRAPHY HEAD: CPT | Mod: 26

## 2020-10-15 RX ORDER — LISINOPRIL 2.5 MG/1
10 TABLET ORAL DAILY
Refills: 0 | Status: DISCONTINUED | OUTPATIENT
Start: 2020-10-15 | End: 2020-10-17

## 2020-10-15 RX ORDER — POTASSIUM CHLORIDE 20 MEQ
20 PACKET (EA) ORAL ONCE
Refills: 0 | Status: COMPLETED | OUTPATIENT
Start: 2020-10-15 | End: 2020-10-15

## 2020-10-15 RX ADMIN — Medication 75 MICROGRAM(S): at 21:42

## 2020-10-15 RX ADMIN — CYCLOSPORINE 1 DROP(S): 0.5 EMULSION OPHTHALMIC at 05:03

## 2020-10-15 RX ADMIN — MEROPENEM 200 MILLIGRAM(S): 1 INJECTION INTRAVENOUS at 21:42

## 2020-10-15 RX ADMIN — CHLORHEXIDINE GLUCONATE 1 APPLICATION(S): 213 SOLUTION TOPICAL at 12:48

## 2020-10-15 RX ADMIN — Medication 5 MILLIGRAM(S): at 05:03

## 2020-10-15 RX ADMIN — CYCLOSPORINE 1 DROP(S): 0.5 EMULSION OPHTHALMIC at 18:05

## 2020-10-15 RX ADMIN — Medication 1 DROP(S): at 18:06

## 2020-10-15 RX ADMIN — POLYETHYLENE GLYCOL 3350 17 GRAM(S): 17 POWDER, FOR SOLUTION ORAL at 12:44

## 2020-10-15 RX ADMIN — LISINOPRIL 10 MILLIGRAM(S): 2.5 TABLET ORAL at 12:47

## 2020-10-15 RX ADMIN — Medication 1: at 09:19

## 2020-10-15 RX ADMIN — Medication 1 DROP(S): at 05:03

## 2020-10-15 RX ADMIN — MEROPENEM 200 MILLIGRAM(S): 1 INJECTION INTRAVENOUS at 14:37

## 2020-10-15 RX ADMIN — MEROPENEM 200 MILLIGRAM(S): 1 INJECTION INTRAVENOUS at 05:03

## 2020-10-15 RX ADMIN — FONDAPARINUX SODIUM 2.5 MILLIGRAM(S): 2.5 INJECTION, SOLUTION SUBCUTANEOUS at 12:42

## 2020-10-15 RX ADMIN — LISINOPRIL 20 MILLIGRAM(S): 2.5 TABLET ORAL at 05:03

## 2020-10-15 RX ADMIN — Medication 20 MILLIEQUIVALENT(S): at 09:49

## 2020-10-15 NOTE — CONSULT NOTE ADULT - SUBJECTIVE AND OBJECTIVE BOX
HISTORY OF PRESENT ILLNESS: HPI:  Patient is a 71 yo F with history of RA, DM, HTN, fibromyalgia, hypothyroidism, brain aneurysm s/p repair BIBEMS for shortness of breath and fever. Per EMS, patient has had 3 days of fever, Tmax 102.5 this morning 7-8 AM, no known COVID exposures, took tylenol at unknown time before being brought in. Symptoms started on Monday. Per EMS, vitals on scene were 114/70, RA 68 improved NRB to 100%. She also has not taken her meds in 2 days. Patient c/o feeling fatigued, short of breath. Denies chest pain. Patient uses a wheelchair at baseline 2/2 arthritis.  Seen with resp distress, hypoxia and tachycardia in ED on admission now being treated for PNA.  Cardiology is called for positive trop and hypotension      PAST MEDICAL & SURGICAL HISTORY:  Hypothyroid    Fibromyalgia    Rheumatoid arthritis    Diabetes            MEDICATIONS:  MEDICATIONS  (STANDING):  artificial  tears Solution 1 Drop(s) Both EYES two times a day  chlorhexidine 2% Cloths 1 Application(s) Topical daily  cycloSPORINE (RESTASIS) 0.05% Emulsion 1 Drop(s) Both EYES two times a day  dextrose 5%. 1000 milliLiter(s) (50 mL/Hr) IV Continuous <Continuous>  dextrose 50% Injectable 12.5 Gram(s) IV Push once  dextrose 50% Injectable 25 Gram(s) IV Push once  dextrose 50% Injectable 25 Gram(s) IV Push once  fondaparinux Injectable 2.5 milliGRAM(s) SubCutaneous daily  insulin lispro (HumaLOG) corrective regimen sliding scale   SubCutaneous three times a day before meals  insulin lispro (HumaLOG) corrective regimen sliding scale   SubCutaneous at bedtime  levothyroxine Injectable 75 MICROGram(s) IV Push at bedtime  lisinopril 10 milliGRAM(s) Oral daily  meropenem  IVPB 2000 milliGRAM(s) IV Intermittent every 8 hours  polyethylene glycol 3350 17 Gram(s) Oral daily  prednisoLONE acetate 1% Suspension 1 Drop(s) Both EYES two times a day  predniSONE   Tablet 5 milliGRAM(s) Oral daily  senna 2 Tablet(s) Oral at bedtime      Allergies    heparin (Unknown)  Lyrica (Unknown)  penicillin (Unknown)  pineapple (Unknown)  Tagamet (Unknown)  walnut (Unknown)    Intolerances        FAMILY HISTORY:    Non-contributary for premature coronary disease or sudden cardiac death    SOCIAL HISTORY:    [X ] Non-smoker  [ ] Smoker  [ ] Alcohol        REVIEW OF SYSTEMS:  [ ]chest pain  [  ]shortness of breath  [  ]palpitations  [  ]syncope  [ ]near syncope [ ]upper extremity weakness   [ ] lower extremity weakness  [  ]diplopia  [  ]altered mental status   [  ]fevers  [ ]chills [ ]nausea  [ ]vomitting  [  ]dysphagia    [ ]abdominal pain  [ ]melena  [ ]BRBPR    [  ]epistaxis  [  ]rash    [ ]lower extremity edema        [ X] All others negative	  [ ] Unable to obtain      LABS:	 	    CARDIAC MARKERS:  CARDIAC MARKERS ( 15 Oct 2020 04:24 )  x     / x     / 36 U/L / x     / 1.6 ng/mL  CARDIAC MARKERS ( 14 Oct 2020 14:32 )  x     / x     / 24 U/L / x     / 4.2 ng/mL                              8.4    7.20  )-----------( 203      ( 15 Oct 2020 04:24 )             28.0     Hb Trend: 8.4<--    10-15    129<L>  |  95<L>  |  7   ----------------------------<  101<H>  3.7   |  25  |  <0.30<L>    Ca    8.3<L>      15 Oct 2020 04:24  Phos  2.7     10-14      Creatinine Trend: <0.30<--, <0.30<--, <0.30<--, <0.30<--, <0.30<--, <0.30<--        PHYSICAL EXAM:  T(C): 36.8 (10-15-20 @ 14:56), Max: 37.7 (10-15-20 @ 00:10)  HR: 70 (10-15-20 @ 14:56) (70 - 93)  BP: 118/76 (10-15-20 @ 14:56) (100/64 - 166/85)  RR: 18 (10-15-20 @ 14:56) (18 - 18)  SpO2: 96% (10-15-20 @ 14:56) (96% - 100%)  Wt(kg): --   BMI (kg/m2): 22.5 (10-13-20 @ 19:26)  I&O's Summary    14 Oct 2020 07:01  -  15 Oct 2020 07:00  --------------------------------------------------------  IN: 340 mL / OUT: 900 mL / NET: -560 mL    15 Oct 2020 07:01  -  15 Oct 2020 20:29  --------------------------------------------------------  IN: 480 mL / OUT: 500 mL / NET: -20 mL    HEENT:  (-)icterus (-)pallor  CV: N S1 S2 1/6 JUAN (+)2 Pulses B/l  Resp:  Clear to ausculatation B/L, normal effort  GI: (+) BS Soft, NT, ND  Lymph:  (-)Edema, (-)obvious lymphadenopathy  Skin: Warm to touch, Normal turgor  Psych: Appropriate mood and affect  	      ECG:  	Sinus 86 BPM, LVH nonspecific T wave abnormalities     RADIOLOGY:         CXR:   < from: Xray Chest 1 View- PORTABLE-Urgent (Xray Chest 1 View- PORTABLE-Urgent .) (10.14.20 @ 14:47) >  Interval decrease in size of left effusion. Ill-defined opacity in the right upper lobe may be due to overlying structures. Consider repeat examination as clinically indicated.    < end of copied text >      ASSESSMENT/PLAN: 	70y Female  history of RA, DM, HTN, fibromyalgia, hypothyroidism, brain aneurysm s/p repair BIBEMS for shortness of breath and fever PNA with an episode of hypotension and (+) Trop.    - ? Subendocardial ischemia in the setting of hypotension,  Check Repeat echo now that patient is extubated doubt ACS   - Pulmonary f/u  - Abx per ID    I once again thank you for allowing me to participate in the care of your patient.  If you have any questions or concerns please do not hesitate to contact me.      Samuel Nicholson MD, [facility]  [facility]PER [phone]

## 2020-10-15 NOTE — CONSULT NOTE ADULT - ASSESSMENT
71 yo F with history of RA, DM, HTN, fibromyalgia, hypothyroidism, brain aneurysm s/p repair (2004?) BIBEMS for shortness of breath and fever. Per EMS, patient has had 3 days of fever, Tmax 102.5 , no known COVID exposures, took tylenol at unknown time before being brought in. patient didn't take her med for 2 days prior to admission. Patient c/o feeling fatigued, short of breath. Denies chest pain. Patient uses a wheelchair at baseline 2/2 arthritis. Intubated and in ICU extubated on 10/9 then transferred to floor.  Neurology called for dizzy sensation and h/o aneurysm.  Na today 129 from 132, Hgb down 8.4-->7.4. 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 CTH obtained unremarkable. TTE with LVH, A1c7.3%  o/e proximal weakness> distal. Dizziness may be multifactorial 2/2 anemia and hyponatremia.  - need to better eval h/o aneurysm? CTA H/N   - MRI brain w/o if able to tolerate otherwise repeat CTH.  - lipid panel  - hyponatremia Na 129. slow correction to 135-145  - monitor Hgb 7.4  - f/u TFTs  - check orthostatics if she is able to sit up. wheelchair bound  - telemetry  - PT/OT/SS/SLP, OOBC  - check FS, glucose control <180  - GI/DVT ppx  - Counseling on diet, exercise, and medication adherence was done  - Counseling on smoking cessation and alcohol consumption offered when appropriate.  - Pain assessed and judicious use of narcotics when appropriate was discussed.    - Stroke education given when appropriate.  - Importance of fall preevntion discussed.   - Differential diagnosis and plan of care discussed with patient and/or family and primary team  - Thank you for allowing me to participate in the care of this patient. Call with questions.   Dez Madera MD  Vascular Neurology  Office: [phone]

## 2020-10-15 NOTE — PROGRESS NOTE ADULT - SUBJECTIVE AND OBJECTIVE BOX
CC: f/u for Listeria bacteremia    Patient reports no complaints. Wants to go home     REVIEW OF SYSTEMS:  All other review of systems negative (Comprehensive ROS)    Antimicrobials Day #  :   meropenem  IVPB 2000 milliGRAM(s) IV Intermittent every 8 hours    Other Medications Reviewed    T(F): 98.3 (10-15-20 @ 14:56), Max: 99.9 (10-15-20 @ 00:10)  HR: 70 (10-15-20 @ 14:56)  BP: 118/76 (10-15-20 @ 14:56)  RR: 18 (10-15-20 @ 14:56)  SpO2: 96% (10-15-20 @ 14:56)  Wt(kg): --    PHYSICAL EXAM:  General: alert, no acute distress  Eyes:  anicteric, no conjunctival injection, no discharge  Oropharynx: no lesions or injection 	  Neck: supple, without adenopathy  Lungs: clear to auscultation  Heart: regular rate and rhythm; no murmur, rubs or gallops  Abdomen: soft, nondistended, nontender, without mass or organomegaly  Skin: no lesions  Extremities: no clubbing or cyanosis. + edema                    RUE PICC   Neurologic: alert & oriented    LAB RESULTS:                        8.4    7.20  )-----------( 203      ( 15 Oct 2020 04:24 )             28.0     10-15    129<L>  |  95<L>  |  7   ----------------------------<  101<H>  3.7   |  25  |  <0.30<L>    Ca    8.3<L>      15 Oct 2020 04:24  Phos  2.7     10-14        Urinalysis Basic - ( 14 Oct 2020 15:24 )    Color: Light Yellow / Appearance: Clear / S.013 / pH: x  Gluc: x / Ketone: Trace  / Bili: Negative / Urobili: Negative   Blood: x / Protein: Trace / Nitrite: Negative   Leuk Esterase: Negative / RBC: 3 /hpf / WBC 1 /HPF   Sq Epi: x / Non Sq Epi: 2 /hpf / Bacteria: Negative      MICROBIOLOGY:  RECENT CULTURES:  10-14 @ 18:50 .Urine Catheterized     No growth                RADIOLOGY REVIEWED:

## 2020-10-15 NOTE — PROGRESS NOTE ADULT - ASSESSMENT
72 yo female with RA, DM, HTN, fibromyalgia, immune suppressed at home on Enbrel and 5 mg of prednisone bid  and remote repair of cerebral aneurysm now admitted with fever, weakness and hypoxia.  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  Required vent & pressors support in ICU for shock - shock resolved.   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  She recalls a rash to PCN, therefor is on meropenem which she appears to be tolerating  RVP is negative, MRSA screen positive.  TTE with mild AS, no vegetations.  Repeat 10/9 blood cultures are NG to date  She had developed pancytopenia, now improved.  S/p RUE PICC placement   Transient fever of 100.4 F on 10/14 - was pancultured  CTA chest neg for PE, CTH no acute event  Urine cx no growth     Suggest:  Continue meropenem for listeria bacteremia and concerns of pneumonia  Anticipate a 3 wks course of antibiotics total & today is D # 8/21  F/u cultures

## 2020-10-15 NOTE — PROGRESS NOTE ADULT - SUBJECTIVE AND OBJECTIVE BOX
Follow-up Pulm Progress Note    Low grade temp yesterday afternoon 100.4 F rectally   C/o intermittent dyspnea - no c/o at this time   Sats 97% RA    Medications:  MEDICATIONS  (STANDING):  artificial  tears Solution 1 Drop(s) Both EYES two times a day  chlorhexidine 2% Cloths 1 Application(s) Topical daily  cycloSPORINE (RESTASIS) 0.05% Emulsion 1 Drop(s) Both EYES two times a day  dextrose 5%. 1000 milliLiter(s) (50 mL/Hr) IV Continuous <Continuous>  dextrose 50% Injectable 12.5 Gram(s) IV Push once  dextrose 50% Injectable 25 Gram(s) IV Push once  dextrose 50% Injectable 25 Gram(s) IV Push once  fondaparinux Injectable 2.5 milliGRAM(s) SubCutaneous daily  insulin lispro (HumaLOG) corrective regimen sliding scale   SubCutaneous three times a day before meals  insulin lispro (HumaLOG) corrective regimen sliding scale   SubCutaneous at bedtime  levothyroxine Injectable 75 MICROGram(s) IV Push at bedtime  lisinopril 10 milliGRAM(s) Oral daily  meropenem  IVPB 2000 milliGRAM(s) IV Intermittent every 8 hours  polyethylene glycol 3350 17 Gram(s) Oral daily  prednisoLONE acetate 1% Suspension 1 Drop(s) Both EYES two times a day  predniSONE   Tablet 5 milliGRAM(s) Oral daily  senna 2 Tablet(s) Oral at bedtime    MEDICATIONS  (PRN):  dextrose 40% Gel 15 Gram(s) Oral once PRN Blood Glucose LESS THAN 70 milliGRAM(s)/deciliter  glucagon  Injectable 1 milliGRAM(s) IntraMuscular once PRN Glucose LESS THAN 70 milligrams/deciliter  hydrALAZINE Injectable 5 milliGRAM(s) IV Push every 6 hours PRN SBP >170  simethicone 80 milliGRAM(s) Chew three times a day PRN Gas          Vital Signs Last 24 Hrs  T(C): 37.6 (15 Oct 2020 04:02), Max: 38 (14 Oct 2020 14:08)  T(F): 99.6 (15 Oct 2020 04:02), Max: 100.4 (14 Oct 2020 14:08)  HR: 93 (15 Oct 2020 04:02) (67 - 93)  BP: 101/65 (15 Oct 2020 04:02) (74/48 - 166/85)  BP(mean): --  RR: 18 (15 Oct 2020 04:02) (18 - 18)  SpO2: 97% (15 Oct 2020 04:02) (95% - 100%)    ABG - ( 14 Oct 2020 14:32 )  pH, Arterial: 7.44  pH, Blood: x     /  pCO2: 39    /  pO2: 150   / HCO3: 27    / Base Excess: 2.8   /  SaO2: 100                   10-14 @ 07:01  -  10-15 @ 07:00  --------------------------------------------------------  IN: 340 mL / OUT: 900 mL / NET: -560 mL          LABS:                        8.4    7.20  )-----------( 203      ( 15 Oct 2020 04:24 )             28.0     10-15    129<L>  |  95<L>  |  7   ----------------------------<  101<H>  3.7   |  25  |  <0.30<L>    Ca    8.3<L>      15 Oct 2020 04:24  Phos  2.7     10-14        CARDIAC MARKERS ( 15 Oct 2020 04:24 )  x     / x     / 36 U/L / x     / 1.6 ng/mL  CARDIAC MARKERS ( 14 Oct 2020 14:32 )  x     / x     / 24 U/L / x     / 4.2 ng/mL      CAPILLARY BLOOD GLUCOSE      POCT Blood Glucose.: 152 mg/dL (15 Oct 2020 08:49)      Urinalysis Basic - ( 14 Oct 2020 15:24 )    Color: Light Yellow / Appearance: Clear / S.013 / pH: x  Gluc: x / Ketone: Trace  / Bili: Negative / Urobili: Negative   Blood: x / Protein: Trace / Nitrite: Negative   Leuk Esterase: Negative / RBC: 3 /hpf / WBC 1 /HPF   Sq Epi: x / Non Sq Epi: 2 /hpf / Bacteria: Negative          CULTURES:     Culture - Blood (collected 10-09-20 @ 02:19)  Source: .Blood Blood-Venous  Final Report (10-14-20 @ 03:01):    No Growth Final    Culture - Blood (collected 10-09-20 @ 02:19)  Source: .Blood Blood-Peripheral  Final Report (10-14-20 @ 03:01):    No Growth Final    Culture - Blood (collected 10-07-20 @ 15:07)  Source: .Blood Blood-Peripheral  Gram Stain (10-08-20 @ 13:24):    Growth in anaerobic bottle: Gram Positive Rods    Growth in aerobic bottle: Gram Positive Rods  Final Report (10-10-20 @ 10:12):    Listeria monocytogenes    Resistance to ampicillin or penicillin for Listeria    monocytogenes has not been described. L. monocytogenes is intrinsically    resistant to cephalosporins.    "Due to technical problems, Proteus sp. will Not be reported as partof    the BCID panel until further notice"    ***Blood Panel PCR results on this specimen are available    approximately 3 hours after the Gram stain result.***    Gram stain, PCR, and/or culture results may not always    correspond due to difference in methodologies.    ************************************************************    This PCR assay was performed using [employer].    The following targets are tested for: Enterococcus,    vancomycin resistant enterococci, Listeria monocytogenes,    coagulase negative staphylococci, S. aureus,    methicillin resistant S. aureus, Streptococcus agalactiae    (Group B), S. pneumoniae, S. pyogenes (Group A),    Acinetobacter baumannii, Enterobacter cloacae, E. coli,    Klebsiella oxytoca, K. pneumoniae, Proteus sp.,    Serratia marcescens, Haemophilus influenzae,    Neisseria meningitidis, Pseudomonas aeruginosa, Candida    albicans, C. glabrata, C krusei, C parapsilosis,    C. tropicalis and the KPC resistance gene.  Organism: Blood Culture PCR (10-10-20 @ 10:12)  Organism: Blood Culture PCR (10-10-20 @ 10:12)      -  Listeria monocytogenes: Detec      Method Type: PCR    Culture - Blood (collected 10-07-20 @ 15:07)  Source: .Blood Blood-Peripheral  Gram Stain (10-08-20 @ 21:16):    Growth in anaerobic bottle: Gram Positive Rods    Growth in aerobic bottle: Gram Positive Rods  Final Report (10-09-20 @ 11:03):    Growth in aerobic and anaerobic bottles: Listeria monocytogenes    Resistance to ampicillin or penicillin for Listeria    monocytogenes has not been described. L. monocytogenes is intrinsically    resistant to cephalosporins.        Culture - Urine (collected 10-07-20 @ 15:26)  Source: .Urine Clean Catch (Midstream)  Final Report (10-09-20 @ 08:38):    >=3 organisms. Probable collection contamination.      Physical Examination:  PULM: Clear to auscultation bilaterally, no significant sputum production  CVS: RRR    RADIOLOGY REVIEWED  CTA chest: < from: CT Angio Chest w/ IV Cont (10.14.20 @ 21:30) >  FINDINGS:    LUNGS AND AIRWAYS: Low lung volumes. Elevated diaphragm. Interval improvement in bilateral posterior and dependent consolidations previously seen on chest CT from 10/7/2020.  PLEURA: Bilateral small left and trace right pleural effusions.  MEDIASTINUM AND ROSEMARY: No lymphadenopathy.  VESSELS: The main pulmonary artery measures 2.1 cm. There is no main, central, lobar, or segmental pulmonary embolus. Right upper extremity PICC with tip terminating in the right atrium. Calcified and noncalcified ulcerative plaque involving the aorta.  HEART: Heart size is normal. No pericardial effusion. Coronary arterial calcifications. No signs of right heart strain.  CHEST WALL AND LOWER NECK: Within normal limits.  VISUALIZED UPPER ABDOMEN: Embolization coil mass in the gastrohepatic space.  BONES: Degenerative changes.    IMPRESSION:    No main, lobar, or segmental pulmonary embolism. Limited evaluation the subsegmental pulmonary arteries due to respiratory motion.    Improvement in bilateral atelectasis seen on chest CT from 10/7/2020. Small left and trace right pleural effusions.    < end of copied text >

## 2020-10-15 NOTE — CONSULT NOTE ADULT - ASSESSMENT
This is a 70/F w/ RA, DM, HTN, fibromyalgia, hypothyroidism, brain aneurysm s/p repair BIBEMS for AMS.  Found in respiratory distress likely 2/2 multifocal PNA, intubated for hypoxemic respiratory failure.  Extubated in MICU w/o complication.  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  Found in respiratory distress likely 2/2 multifocal PNA, intubated for hypoxemic respiratory failure.  Extubated in MICU w/o complication.  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  SHe is a vegetarian and gets her protein intake from nonanimal meats.  This could also be from hypotension that will lead to pre-renal azotemia(and decrease in ability to excrete free water)    1 Rhem-At present no need for stress steroids but if hypotension happens again then IV steroids  2 Renal-Check urine lytes and osmolarity;  Increase Glucerna to tid and no salt restriction; THe abx has a large salt load as well  3 ID-IV abx   4 CVS-DC the lisinopril     Sayed [facility]   [phone] This is a 70/F w/ RA, DM, HTN, fibromyalgia, hypothyroidism, brain aneurysm s/p repair BIBEMS for AMS.  Found in respiratory distress likely 2/2 multifocal PNA, intubated for hypoxemic respiratory failure.  Extubated in MICU w/o complication.  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  SHe is a vegetarian and gets her protein intake from nonanimal meats.  This could also be from hypotension that will lead to pre-renal azotemia(and decrease in ability to excrete free water)    1 Rhem-At present no need for stress steroids but if hypotension happens again then IV steroids  2 Renal-Check urine lytes and osmolarity;  Increase Glucerna to tid and no salt restriction; THe abx has a large salt load as well  3 ID-IV abx   4 CVS-Reduce the lisinopril to 10mg and will monitor the BP.  (up and down)    Sayed [facility]   [phone]

## 2020-10-15 NOTE — CONSULT NOTE ADULT - SUBJECTIVE AND OBJECTIVE BOX
NEPHROLOGY - NSN    Patient seen and examined.    HPI:  Patient is a 71 yo F with history of RA, DM, HTN, fibromyalgia, hypothyroidism, brain aneurysm s/p repair BIBEMS for shortness of breath and fever. Per EMS, patient has had 3 days of fever, Tmax 102.5 this morning 7-8 AM, no known COVID exposures, took tylenol at unknown time before being brought in. Symptoms started on Monday. Per EMS, vitals on scene were 114/70, RA 68 improved NRB to 100%. She also has not taken her meds in 2 days. Patient c/o feeling fatigued, short of breath. Denies chest pain. Patient uses a wheelchair at baseline 2/2 arthritis.  Seen with resp distress, hypoxia and tachycardia in ED (07 Oct 2020 13:58)    MICU Course:   Patient is a 71 yo F with history of RA, DM, HTN, fibromyalgia, hypothyroidism, brain aneurysm s/p repair BIBEMS for shortness of breath and fever. Pt was placed on BIPAP in ED, and continued to be hypoxic with AMS. Pt was intubated in ED 10/7 for acute hypoxemic respiratory failure 2/2 multifocal PNA vs atelectasis      Initially required levophed in ED, but was weaned off upon arrival to MICU. Pt was started on vanco, ceftriaxone, and azithromycin. COVID and RVP were negative. Urine legionella was negative. Blood cultures grew listeria and meropenem was started due to penicillin allergy (bad hives). Other abx discontinued. Pt was extubated on 10/9 at 5am.  Mental status improved. Pt passed bedside swallow and was tolerating PO with regular diet. Course c/b hypertension, started on home antihypertensives.  Labetalol added for additional bp control.          PAST MEDICAL & SURGICAL HISTORY:  Hypothyroid    Fibromyalgia    Rheumatoid arthritis    Diabetes        MEDICATIONS  (STANDING):  artificial  tears Solution 1 Drop(s) Both EYES two times a day  chlorhexidine 2% Cloths 1 Application(s) Topical daily  cycloSPORINE (RESTASIS) 0.05% Emulsion 1 Drop(s) Both EYES two times a day  dextrose 5%. 1000 milliLiter(s) (50 mL/Hr) IV Continuous <Continuous>  dextrose 50% Injectable 12.5 Gram(s) IV Push once  dextrose 50% Injectable 25 Gram(s) IV Push once  dextrose 50% Injectable 25 Gram(s) IV Push once  fondaparinux Injectable 2.5 milliGRAM(s) SubCutaneous daily  insulin lispro (HumaLOG) corrective regimen sliding scale   SubCutaneous three times a day before meals  insulin lispro (HumaLOG) corrective regimen sliding scale   SubCutaneous at bedtime  levothyroxine Injectable 75 MICROGram(s) IV Push at bedtime  lisinopril 20 milliGRAM(s) Oral daily  meropenem  IVPB 2000 milliGRAM(s) IV Intermittent every 8 hours  polyethylene glycol 3350 17 Gram(s) Oral daily  potassium chloride    Tablet ER 20 milliEquivalent(s) Oral once  prednisoLONE acetate 1% Suspension 1 Drop(s) Both EYES two times a day  predniSONE   Tablet 5 milliGRAM(s) Oral daily  senna 2 Tablet(s) Oral at bedtime      Allergies    heparin (Unknown)  Lyrica (Unknown)  penicillin (Unknown)  pineapple (Unknown)  Tagamet (Unknown)  walnut (Unknown)    Intolerances        SOCIAL HISTORY:  Denies alcohol abuse, drug abuse or tobacco usage.     FAMILY HISTORY:      VITALS:  T(C): 37.6 (10-15-20 @ 04:02), Max: 38 (10-14-20 @ 14:08)  HR: 93 (10-15-20 @ 04:02) (67 - 93)  BP: 101/65 (10-15-20 @ 04:02) (74/48 - 166/85)  RR: 18 (10-15-20 @ 04:02) (18 - 18)  SpO2: 97% (10-15-20 @ 04:02) (95% - 100%)    REVIEW OF SYSTEMS:  Denies any nausea, vomiting, diarrhea, fever or chills. Denies chest pain, SOB, focal weakness, hematuria or dysuria. Good oral intake and denies fatigue or weakness. All other pertinent systems are reviewed and are negative.    PHYSICAL EXAM:  Constitutional: NAD  HEENT: EOMI  Neck:  No JVD, supple   Respiratory: CTA B/L  Cardiovascular: S1 and S2, RRR  Gastrointestinal: + BS, soft, NT, ND  Extremities: No peripheral edema, + peripheral pulses  Neurological: A/O x 3, CN2-12 intact  Psychiatric: Normal mood, normal affect  : No Mcbride  Skin: No rashes, C/D/I  Access: Not applicable    I and O's:    10-13 @ 07:01  -  10-14 @ 07:00  --------------------------------------------------------  IN: 1200 mL / OUT: 800 mL / NET: 400 mL    10-14 @ 07:01  -  10-15 @ 07:00  --------------------------------------------------------  IN: 340 mL / OUT: 900 mL / NET: -560 mL          LABS:                        8.4    7.20  )-----------( 203      ( 15 Oct 2020 04:24 )             28.0     10-15    129<L>  |  95<L>  |  7   ----------------------------<  101<H>  3.7   |  25  |  <0.30<L>    Ca    8.3<L>      15 Oct 2020 04:24  Phos  2.7     10-14        URINE:  Urinalysis Basic - ( 14 Oct 2020 15:24 )    Color: Light Yellow / Appearance: Clear / S.013 / pH: x  Gluc: x / Ketone: Trace  / Bili: Negative / Urobili: Negative   Blood: x / Protein: Trace / Nitrite: Negative   Leuk Esterase: Negative / RBC: 3 /hpf / WBC 1 /HPF   Sq Epi: x / Non Sq Epi: 2 /hpf / Bacteria: Negative        RADIOLOGY & ADDITIONAL STUDIES:   < from: CT Angio Chest w/ IV Cont (10.14.20 @ 21:30) >    EXAM:  CT ANGIO CHEST (W)AW IC                            PROCEDURE DATE:  10/14/2020            INTERPRETATION:  CLINICAL INFORMATION: Shortness of breath.    COMPARISON: Chest CT from 10/7/2020.    PROCEDURE:  CT Angiography of the Chest.  90 ml of Omnipaque 350 was injected intravenously. 10 ml were discarded.  Sagittal and coronal reformats were performed as well as 3D (MIP) reconstructions.    FINDINGS:    LUNGS AND AIRWAYS: Low lung volumes. Elevated diaphragm. Interval improvement in bilateral posterior and dependent consolidations previously seen on chest CT from 10/7/2020.  PLEURA: Bilateral small left and trace right pleural effusions.  MEDIASTINUM AND ROSEMARY: No lymphadenopathy.  VESSELS: The main pulmonary artery measures 2.1 cm. There is no main, central, lobar, or segmental pulmonary embolus. Right upper extremity PICC with tip terminating in the right atrium. Calcified and noncalcified ulcerative plaque involving the aorta.  HEART: Heart size is normal. No pericardial effusion. Coronary arterial calcifications. No signs of right heart strain.  CHEST WALL AND LOWER NECK: Within normal limits.  VISUALIZED UPPER ABDOMEN: Embolization coil mass in the gastrohepatic space.  BONES: Degenerative changes.    IMPRESSION:    No main, lobar, or segmental pulmonary embolism. Limited evaluation the subsegmental pulmonary arteries due to respiratory motion.    Improvement in bilateral atelectasis seen on chest CT from 10/7/2020. Small left and trace right pleural effusions.              LUPE COOPER M.D., RADIOLOGY RESIDENT  This document has been electronically signed.  KALI MONTEIRO MD; Attending Radiologist  This document has been electronically signed. Oct 14 2020 11:39PM    < end of copied text >   NEPHROLOGY - NSN    Patient seen and examined.    HPI:  Patient is a 69 yo F with history of RA, DM, HTN, fibromyalgia, hypothyroidism, brain aneurysm s/p repair BIBEMS for shortness of breath and fever. Per EMS, patient has had 3 days of fever, Tmax 102.5 this morning 7-8 AM, no known COVID exposures, took tylenol at unknown time before being brought in. Symptoms started on Monday. Per EMS, vitals on scene were 114/70, RA 68 improved NRB to 100%. She also has not taken her meds in 2 days. Patient c/o feeling fatigued, short of breath. Denies chest pain. Patient uses a wheelchair at baseline 2/2 arthritis.  Seen with resp distress, hypoxia and tachycardia in ED (07 Oct 2020 13:58)    MICU Course:   Patient is a 69 yo F with history of RA, DM, HTN, fibromyalgia, hypothyroidism, brain aneurysm s/p repair BIBEMS for shortness of breath and fever. Pt was placed on BIPAP in ED, and continued to be hypoxic with AMS. Pt was intubated in ED 10/7 for acute hypoxemic respiratory failure 2/2 multifocal PNA vs atelectasis      Initially required levophed in ED, but was weaned off upon arrival to MICU. Pt was started on vanco, ceftriaxone, and azithromycin. COVID and RVP were negative. Urine legionella was negative. Blood cultures grew listeria and meropenem was started due to penicillin allergy (bad hives). Other abx discontinued. Pt was extubated on 10/9 at 5am.  Mental status improved. Pt passed bedside swallow and was tolerating PO with regular diet. Course c/b hypertension, started on home antihypertensives.  Labetalol added for additional bp control.      Pt at present is asymptomatic at present.  BP is still a little soft.  She is only on Prednisone for the RA  Diabetes has been since  and she does have retinopathy.  HTN is also about the same time as well  There is no hematuria or bubbles in the urine.  No history of NSAIDS or nephrolithisis.  The patient urinates once or twice in the night and there is no incontinence.  No family hx or renal disease or back pain.    No recent abx use.  No alleviating or aggravating factors with respect to the kidneys.     PAST MEDICAL & SURGICAL HISTORY:  Hypothyroid    Fibromyalgia    Rheumatoid arthritis    Diabetes        MEDICATIONS  (STANDING):  artificial  tears Solution 1 Drop(s) Both EYES two times a day  chlorhexidine 2% Cloths 1 Application(s) Topical daily  cycloSPORINE (RESTASIS) 0.05% Emulsion 1 Drop(s) Both EYES two times a day  dextrose 5%. 1000 milliLiter(s) (50 mL/Hr) IV Continuous <Continuous>  dextrose 50% Injectable 12.5 Gram(s) IV Push once  dextrose 50% Injectable 25 Gram(s) IV Push once  dextrose 50% Injectable 25 Gram(s) IV Push once  fondaparinux Injectable 2.5 milliGRAM(s) SubCutaneous daily  insulin lispro (HumaLOG) corrective regimen sliding scale   SubCutaneous three times a day before meals  insulin lispro (HumaLOG) corrective regimen sliding scale   SubCutaneous at bedtime  levothyroxine Injectable 75 MICROGram(s) IV Push at bedtime  lisinopril 20 milliGRAM(s) Oral daily  meropenem  IVPB 2000 milliGRAM(s) IV Intermittent every 8 hours  polyethylene glycol 3350 17 Gram(s) Oral daily  potassium chloride    Tablet ER 20 milliEquivalent(s) Oral once  prednisoLONE acetate 1% Suspension 1 Drop(s) Both EYES two times a day  predniSONE   Tablet 5 milliGRAM(s) Oral daily  senna 2 Tablet(s) Oral at bedtime      Allergies    heparin (Unknown)  Lyrica (Unknown)  penicillin (Unknown)  pineapple (Unknown)  Tagamet (Unknown)  walnut (Unknown)    Intolerances        SOCIAL HISTORY:  Denies alcohol abuse, drug abuse or tobacco usage.     FAMILY HISTORY:      VITALS:  T(C): 37.6 (10-15-20 @ 04:02), Max: 38 (10-14-20 @ 14:08)  HR: 93 (10-15-20 @ 04:02) (67 - 93)  BP: 101/65 (10-15-20 @ 04:02) (74/48 - 166/85)  RR: 18 (10-15-20 @ 04:02) (18 - 18)  SpO2: 97% (10-15-20 @ 04:02) (95% - 100%)    REVIEW OF SYSTEMS:  Denies any nausea, vomiting, diarrhea, fever or chills.   Good oral intake and denies fatigue or weakness. All other pertinent systems are reviewed and are negative.    PHYSICAL EXAM:  Constitutional: NAD  HEENT: EOMI  Neck:  No JVD, supple   Respiratory: CTA B/L  Cardiovascular: S1 and S2, RRR  Gastrointestinal: + BS, soft, NT, ND  Extremities: No peripheral edema, + peripheral pulses  Neurological: A/O x 3, CN2-12 intact  Psychiatric: Normal mood, normal affect  : No Mcbride  Skin: No rashes, C/D/I  Access: Not applicable    I and O's:    10-13 @ 07:  -  10-14 @ 07:00  --------------------------------------------------------  IN: 1200 mL / OUT: 800 mL / NET: 400 mL    10-14 @ 07:  -  10-15 @ 07:00  --------------------------------------------------------  IN: 340 mL / OUT: 900 mL / NET: -560 mL          LABS:                        8.4    7.20  )-----------( 203      ( 15 Oct 2020 04:24 )             28.0     10-15    129<L>  |  95<L>  |  7   ----------------------------<  101<H>  3.7   |  25  |  <0.30<L>    Ca    8.3<L>      15 Oct 2020 04:24  Phos  2.7     10-14        URINE:  Urinalysis Basic - ( 14 Oct 2020 15:24 )    Color: Light Yellow / Appearance: Clear / S.013 / pH: x  Gluc: x / Ketone: Trace  / Bili: Negative / Urobili: Negative   Blood: x / Protein: Trace / Nitrite: Negative   Leuk Esterase: Negative / RBC: 3 /hpf / WBC 1 /HPF   Sq Epi: x / Non Sq Epi: 2 /hpf / Bacteria: Negative        RADIOLOGY & ADDITIONAL STUDIES:   < from: CT Angio Chest w/ IV Cont (10.14.20 @ 21:30) >    EXAM:  CT ANGIO CHEST (W)AW IC                            PROCEDURE DATE:  10/14/2020            INTERPRETATION:  CLINICAL INFORMATION: Shortness of breath.    COMPARISON: Chest CT from 10/7/2020.    PROCEDURE:  CT Angiography of the Chest.  90 ml of Omnipaque 350 was injected intravenously. 10 ml were discarded.  Sagittal and coronal reformats were performed as well as 3D (MIP) reconstructions.    FINDINGS:    LUNGS AND AIRWAYS: Low lung volumes. Elevated diaphragm. Interval improvement in bilateral posterior and dependent consolidations previously seen on chest CT from 10/7/2020.  PLEURA: Bilateral small left and trace right pleural effusions.  MEDIASTINUM AND ROSEMARY: No lymphadenopathy.  VESSELS: The main pulmonary artery measures 2.1 cm. There is no main, central, lobar, or segmental pulmonary embolus. Right upper extremity PICC with tip terminating in the right atrium. Calcified and noncalcified ulcerative plaque involving the aorta.  HEART: Heart size is normal. No pericardial effusion. Coronary arterial calcifications. No signs of right heart strain.  CHEST WALL AND LOWER NECK: Within normal limits.  VISUALIZED UPPER ABDOMEN: Embolization coil mass in the gastrohepatic space.  BONES: Degenerative changes.    IMPRESSION:    No main, lobar, or segmental pulmonary embolism. Limited evaluation the subsegmental pulmonary arteries due to respiratory motion.    Improvement in bilateral atelectasis seen on chest CT from 10/7/2020. Small left and trace right pleural effusions.              LUPE COOPER M.D., RADIOLOGY RESIDENT  This document has been electronically signed.  KALI MONTEIRO MD; Attending Radiologist  This document has been electronically signed. Oct 14 2020 11:39PM    < end of copied text >

## 2020-10-15 NOTE — CONSULT NOTE ADULT - SUBJECTIVE AND OBJECTIVE BOX
Neurology Consult    Reason for Consult: h/o aneurysm.  dizziness       HPI:  Patient is a 71 yo F with history of RA, DM, HTN, fibromyalgia, hypothyroidism, brain aneurysm s/p repair (?) BIBEMS for shortness of breath and fever. Per EMS, patient has had 3 days of fever, Tmax 102.5 , no known COVID exposures, took tylenol at unknown time before being brought in. Symptoms started on Monday. Per EMS, vitals on scene were 114/70, RA 68 improved NRB to 100%. She also has not taken her meds in 2 days. Patient c/o feeling fatigued, short of breath. Denies chest pain. Patient uses a wheelchair at baseline 2/2 arthritis. Intubated and in ICU extubated on 10/9 then transferred to floor.  Neurology called for dizzy sensation and h/o aneurysm.  Na today 129 from 132, Hgb down 8.4-->7.4. 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        PAST MEDICAL & SURGICAL HISTORY:  Hypothyroid    Fibromyalgia    Rheumatoid arthritis    Diabetes        Allergies: Allergies    heparin (Unknown)  Lyrica (Unknown)  penicillin (Unknown)  pineapple (Unknown)  Tagamet (Unknown)  walnut (Unknown)    Intolerances        Social History: Denies toxic habits including tobacco, ETOH or illicit drugs.    Family History: FAMILY HISTORY:  . No family history of strokes    Medications: MEDICATIONS  (STANDING):  artificial  tears Solution 1 Drop(s) Both EYES two times a day  chlorhexidine 2% Cloths 1 Application(s) Topical daily  cycloSPORINE (RESTASIS) 0.05% Emulsion 1 Drop(s) Both EYES two times a day  dextrose 5%. 1000 milliLiter(s) (50 mL/Hr) IV Continuous <Continuous>  dextrose 50% Injectable 12.5 Gram(s) IV Push once  dextrose 50% Injectable 25 Gram(s) IV Push once  dextrose 50% Injectable 25 Gram(s) IV Push once  fondaparinux Injectable 2.5 milliGRAM(s) SubCutaneous daily  insulin lispro (HumaLOG) corrective regimen sliding scale   SubCutaneous three times a day before meals  insulin lispro (HumaLOG) corrective regimen sliding scale   SubCutaneous at bedtime  levothyroxine Injectable 75 MICROGram(s) IV Push at bedtime  lisinopril 10 milliGRAM(s) Oral daily  meropenem  IVPB 2000 milliGRAM(s) IV Intermittent every 8 hours  polyethylene glycol 3350 17 Gram(s) Oral daily  prednisoLONE acetate 1% Suspension 1 Drop(s) Both EYES two times a day  predniSONE   Tablet 5 milliGRAM(s) Oral daily  senna 2 Tablet(s) Oral at bedtime    MEDICATIONS  (PRN):  dextrose 40% Gel 15 Gram(s) Oral once PRN Blood Glucose LESS THAN 70 milliGRAM(s)/deciliter  glucagon  Injectable 1 milliGRAM(s) IntraMuscular once PRN Glucose LESS THAN 70 milligrams/deciliter  hydrALAZINE Injectable 5 milliGRAM(s) IV Push every 6 hours PRN SBP >170  simethicone 80 milliGRAM(s) Chew three times a day PRN Gas      Review of Systems:  CONSTITUTIONAL:  No weight loss, fever, chills, weakness or fatigue.  HEENT:  Eyes:  No visual loss, blurred vision, double vision or yellow sclera. Ears, Nose, Throat:  No hearing loss, sneezing, congestion, runny nose or sore throat.  SKIN:  No rash or itching.  CARDIOVASCULAR:  No chest pain, chest pressure or chest discomfort. No palpitations or edema.  RESPIRATORY:  No shortness of breath, cough or sputum.  GASTROINTESTINAL:  No anorexia, nausea, vomiting or diarrhea. No abdominal pain or blood.  GENITOURINARY:  No burning on urination or incontinence   NEUROLOGICAL:  No headache, dizziness, syncope, paralysis, ataxia, numbness or tingling in the extremities. No change in bowel or bladder control. no limb weakness. no vision changes.   MUSCULOSKELETAL:  No muscle, back pain, joint pain or stiffness.  HEMATOLOGIC:  No anemia, bleeding or bruising.  LYMPHATICS:  No enlarged nodes. No history of splenectomy.  PSYCHIATRIC:  No history of depression or anxiety.  ENDOCRINOLOGIC:  No reports of sweating, cold or heat intolerance. No polyuria or polydipsia.      Vitals:  Vital Signs Last 24 Hrs  T(C): 37.6 (15 Oct 2020 04:02), Max: 38 (14 Oct 2020 14:08)  T(F): 99.6 (15 Oct 2020 04:02), Max: 100.4 (14 Oct 2020 14:08)  HR: 93 (15 Oct 2020 04:02) (67 - 93)  BP: 101/65 (15 Oct 2020 04:02) (74/48 - 166/85)  BP(mean): --  RR: 18 (15 Oct 2020 04:02) (18 - 18)  SpO2: 97% (15 Oct 2020 04:02) (97% - 100%)    General Exam:   General Appearance: Appropriately dressed and in no acute distress       Head: Normocephalic, atraumatic and no dysmorphic features  Ear, Nose, and Throat: Moist mucous membranes  CVS: S1S2+  Resp: mild SOB but no wheeze  GI: soft NT/ND  Extremeties: mild LE edema no  cyanosis  Skin: No bruises or rashes     Neurological Exam:  Mental Status: Awake, alert and oriented x 3.  Able to follow simple and complex verbal commands. Able to name and repeat. fluent speech. No obvious aphasia or dysarthria noted.   Cranial Nerves: PERRL, EOMI, VFFC, sensation V1-V3 intact,  no obvious facial assymetry, equal elevation of palate, scm/trap 5/5, tongue is midline on protrusion. no obvious papilledema on fundoscopic exam. hearing is grossly intact.   Motor: GAMA but proximally limited motion 1/5 in UE and LE proximally, but distally x 4 she is 4/5 (baseline?)   Sensation: Intact to light touch and pinprick throughout. no right/left confusion.   Reflexes: 1+ throughout at biceps, brachioradialis, triceps, patellars and ankles bilaterally and equal. No clonus. R toe and L toe were both downgoing.  Coordination: unable   Gait: wheelchair bound    Data/Labs/Imaging which I personally reviewed.     Labs:     CBC Full  -  ( 15 Oct 2020 04:24 )  WBC Count : 7.20 K/uL  RBC Count : 3.36 M/uL  Hemoglobin : 8.4 g/dL  Hematocrit : 28.0 %  Platelet Count - Automated : 203 K/uL  Mean Cell Volume : 83.3 fl  Mean Cell Hemoglobin : 25.0 pg  Mean Cell Hemoglobin Concentration : 30.0 gm/dL  Auto Neutrophil # : x  Auto Lymphocyte # : x  Auto Monocyte # : x  Auto Eosinophil # : x  Auto Basophil # : x  Auto Neutrophil % : x  Auto Lymphocyte % : x  Auto Monocyte % : x  Auto Eosinophil % : x  Auto Basophil % : x    10-15    129<L>  |  95<L>  |  7   ----------------------------<  101<H>  3.7   |  25  |  <0.30<L>    Ca    8.3<L>      15 Oct 2020 04:24  Phos  2.7     10-14          Urinalysis Basic - ( 14 Oct 2020 15:24 )    Color: Light Yellow / Appearance: Clear / S.013 / pH: x  Gluc: x / Ketone: Trace  / Bili: Negative / Urobili: Negative   Blood: x / Protein: Trace / Nitrite: Negative   Leuk Esterase: Negative / RBC: 3 /hpf / WBC 1 /HPF   Sq Epi: x / Non Sq Epi: 2 /hpf / Bacteria: Negative        CTA chest: < from: CT Angio Chest w/ IV Cont (10.14.20 @ 21:30) >  FINDINGS:    LUNGS AND AIRWAYS: Low lung volumes. Elevated diaphragm. Interval improvement in bilateral posterior and dependent consolidations previously seen on chest CT from 10/7/2020.  PLEURA: Bilateral small left and trace right pleural effusions.  MEDIASTINUM AND ROSEMARY: No lymphadenopathy.  VESSELS: The main pulmonary artery measures 2.1 cm. There is no main, central, lobar, or segmental pulmonary embolus. Right upper extremity PICC with tip terminating in the right atrium. Calcified and noncalcified ulcerative plaque involving the aorta.  HEART: Heart size is normal. No pericardial effusion. Coronary arterial calcifications. No signs of right heart strain.  CHEST WALL AND LOWER NECK: Within normal limits.  VISUALIZED UPPER ABDOMEN: Embolization coil mass in the gastrohepatic space.  BONES: Degenerative changes.    IMPRESSION:    No main, lobar, or segmental pulmonary embolism. Limited evaluation the subsegmental pulmonary arteries due to respiratory motion.    Improvement in bilateral atelectasis seen on chest CT from 10/7/2020. Small left and trace right pleural effusions.      [facility] 10/14/20  IMPRESSION: No acute intracranial hemorrhage, mass effect, or shift of the midline structures.    Mild interval advancement of chronic white matter microvascular type changes.    TTE: 1. Calcified trileaflet aortic valve with decreased  opening. Peak transaortic valve gradient equals 16 mm Hg,  mean transaortic valve gradient equals 8 mm Hg, estimated  aortic valve area equals 1.6 sqcm (by continuity equation),  aortic valve velocity time integral equals 28 cm,  consistent with mild aortic stenosis.  2. Increased relative wall thickness with normal left  ventricular mass index, consistent with concentric left  ventricular remodeling.  3. Normal left ventricular systolic function. No segmental  wall motion abnormalities.  4. Normal right ventricular size and function.  *** No previous Echo exam.  Unable to rule out endocarditis, consider JAIME if clinicaly  indicated.  Hgb A1c 7.3

## 2020-10-15 NOTE — CHART NOTE - NSCHARTNOTEFT_GEN_A_CORE
Nutrition Follow Up Note  Patient seen for: follow up on 3 DSU    · Reason for Admission	SOB      Source: pt, EMR    Diet : Diet, Regular:   Lacto Veg (Accepts Milk & Milk Products)  No Beef  No Egg  No Fish  No Pork  No Poultry  No Shellfish  Supplement Feeding Modality:  Oral  Ensure Enlive Cans or Servings Per Day:  2       Frequency:  Daily  Probiotic Yogurt/Smoothie Cans or Servings Per Day:  2       Frequency:  Daily (10-15-20 @ 08:59) [Active]      Patient reports: allergy to pineapple and walnuts. she avoids soy, as per her MD. pt has diabetes-requesting ensure be changed to Glucerna as per her MD.      PO intake :     Source for PO intake:     Enteral /Parenteral Nutrition:       Daily Weight in k.9 (10-11), Weight in k.5 (10-)  % Weight Change    Pertinent Medications: MEDICATIONS  (STANDING):  artificial  tears Solution 1 Drop(s) Both EYES two times a day  chlorhexidine 2% Cloths 1 Application(s) Topical daily  cycloSPORINE (RESTASIS) 0.05% Emulsion 1 Drop(s) Both EYES two times a day  dextrose 5%. 1000 milliLiter(s) (50 mL/Hr) IV Continuous <Continuous>  dextrose 50% Injectable 12.5 Gram(s) IV Push once  dextrose 50% Injectable 25 Gram(s) IV Push once  dextrose 50% Injectable 25 Gram(s) IV Push once  fondaparinux Injectable 2.5 milliGRAM(s) SubCutaneous daily  insulin lispro (HumaLOG) corrective regimen sliding scale   SubCutaneous three times a day before meals  insulin lispro (HumaLOG) corrective regimen sliding scale   SubCutaneous at bedtime  levothyroxine Injectable 75 MICROGram(s) IV Push at bedtime  lisinopril 20 milliGRAM(s) Oral daily  meropenem  IVPB 2000 milliGRAM(s) IV Intermittent every 8 hours  polyethylene glycol 3350 17 Gram(s) Oral daily  prednisoLONE acetate 1% Suspension 1 Drop(s) Both EYES two times a day  predniSONE   Tablet 5 milliGRAM(s) Oral daily  senna 2 Tablet(s) Oral at bedtime    MEDICATIONS  (PRN):  dextrose 40% Gel 15 Gram(s) Oral once PRN Blood Glucose LESS THAN 70 milliGRAM(s)/deciliter  glucagon  Injectable 1 milliGRAM(s) IntraMuscular once PRN Glucose LESS THAN 70 milligrams/deciliter  hydrALAZINE Injectable 5 milliGRAM(s) IV Push every 6 hours PRN SBP >170  simethicone 80 milliGRAM(s) Chew three times a day PRN Gas    Pertinent Labs: 10-15 @ 04:24: Na 129<L>, BUN 7, Cr <0.30<L>, <H>, K+ 3.7  10-14 @ 14:32: Na 132<L>, BUN 9, Cr <0.30<L>, <H>, K+ 4.0  10/9 A1c 7.3    Finger Sticks:  POCT Blood Glucose.: 152 mg/dL (10-15 @ 08:49)  POCT Blood Glucose.: 127 mg/dL (10-14 @ 21:40)  POCT Blood Glucose.: 102 mg/dL (10-14 @ 17:20)  POCT Blood Glucose.: 129 mg/dL (10-14 @ 14:09)  POCT Blood Glucose.: 153 mg/dL (10-14 @ 12:13)      Skin per nursing documentation: no pressure injury  Edema: +1 left wrist, right wrist    Estimated Needs:   [ x] no change since previous assessment  [ ] recalculated:     Previous Nutrition Diagnosis: none      New Nutrition Diagnosis: Decreased Nutrient Needs  related to endocrine dysfunction  as evidenced by diabetes      Recommend/ Interventions:   recommend change diet to Consistent Carbohydrate lacto vegetarian  change supplement to Glucerna x 2 daily  RD provided diabetes ed-monitor need for reinforcement    Monitoring and Evaluation:     Continue to monitor Nutritional intake, Tolerance to diet prescription, weights, labs, skin integrity    RD remains available upon request and will follow up per protocol  Denise Hector MA, RD, CDN #[phone] Nutrition Follow Up Note  Patient seen for: follow up on 3 DSU    · Reason for Admission	SOB      Source: pt, EMR    Diet : Diet, Regular:   Lacto Veg (Accepts Milk & Milk Products)  No Beef  No Egg  No Fish  No Pork  No Poultry  No Shellfish  Supplement Feeding Modality:  Oral  Ensure Enlive Cans or Servings Per Day:  2       Frequency:  Daily  Probiotic Yogurt/Smoothie Cans or Servings Per Day:  2       Frequency:  Daily (10-15-20 @ 08:59) [Active]      Patient reports: allergy to pineapple and walnuts. she avoids soy, as per her MD. pt has diabetes-requesting ensure be changed to Glucerna as per her MD.      PO intake :     Source for PO intake:     Enteral /Parenteral Nutrition:       Daily Weight in k.9 (10-11), Weight in k.5 (10-)  % Weight Change    Pertinent Medications: MEDICATIONS  (STANDING):  artificial  tears Solution 1 Drop(s) Both EYES two times a day  chlorhexidine 2% Cloths 1 Application(s) Topical daily  cycloSPORINE (RESTASIS) 0.05% Emulsion 1 Drop(s) Both EYES two times a day  dextrose 5%. 1000 milliLiter(s) (50 mL/Hr) IV Continuous <Continuous>  dextrose 50% Injectable 12.5 Gram(s) IV Push once  dextrose 50% Injectable 25 Gram(s) IV Push once  dextrose 50% Injectable 25 Gram(s) IV Push once  fondaparinux Injectable 2.5 milliGRAM(s) SubCutaneous daily  insulin lispro (HumaLOG) corrective regimen sliding scale   SubCutaneous three times a day before meals  insulin lispro (HumaLOG) corrective regimen sliding scale   SubCutaneous at bedtime  levothyroxine Injectable 75 MICROGram(s) IV Push at bedtime  lisinopril 20 milliGRAM(s) Oral daily  meropenem  IVPB 2000 milliGRAM(s) IV Intermittent every 8 hours  polyethylene glycol 3350 17 Gram(s) Oral daily  prednisoLONE acetate 1% Suspension 1 Drop(s) Both EYES two times a day  predniSONE   Tablet 5 milliGRAM(s) Oral daily  senna 2 Tablet(s) Oral at bedtime    MEDICATIONS  (PRN):  dextrose 40% Gel 15 Gram(s) Oral once PRN Blood Glucose LESS THAN 70 milliGRAM(s)/deciliter  glucagon  Injectable 1 milliGRAM(s) IntraMuscular once PRN Glucose LESS THAN 70 milligrams/deciliter  hydrALAZINE Injectable 5 milliGRAM(s) IV Push every 6 hours PRN SBP >170  simethicone 80 milliGRAM(s) Chew three times a day PRN Gas    Pertinent Labs: 10-15 @ 04:24: Na 129<L>, BUN 7, Cr <0.30<L>, <H>, K+ 3.7  10-14 @ 14:32: Na 132<L>, BUN 9, Cr <0.30<L>, <H>, K+ 4.0  10/9 A1c 7.3    Finger Sticks:  POCT Blood Glucose.: 152 mg/dL (10-15 @ 08:49)  POCT Blood Glucose.: 127 mg/dL (10-14 @ 21:40)  POCT Blood Glucose.: 102 mg/dL (10-14 @ 17:20)  POCT Blood Glucose.: 129 mg/dL (10-14 @ 14:09)  POCT Blood Glucose.: 153 mg/dL (10-14 @ 12:13)      Skin per nursing documentation: no pressure injury  Edema: +1 left wrist, right wrist    Estimated Needs:   [ x] no change since previous assessment  [ ] recalculated:     Previous Nutrition Diagnosis: none      New Nutrition Diagnosis: Decreased Nutrient Needs  related to endocrine dysfunction  as evidenced by diabetes      Recommend/ Interventions:   recommend add Consistent Carbohydrate to current diet order-pt has diabetes  change supplement to Glucerna x 2 daily  RD provided diabetes ed-monitor need for reinforcement  placed pending verification    Monitoring and Evaluation:     Continue to monitor Nutritional intake, Tolerance to diet prescription, weights, labs, skin integrity    RD remains available upon request and will follow up per protocol  Denise Hector MA, RD, CDN #[phone] Nutrition Follow Up Note  Patient seen for: follow up on 3 DSU    · Reason for Admission	SOB      Source: pt, EMR    Diet : Diet, Regular:   Lacto Veg (Accepts Milk & Milk Products)  No Beef  No Egg  No Fish  No Pork  No Poultry  No Shellfish  Supplement Feeding Modality:  Oral  Ensure Enlive Cans or Servings Per Day:  2       Frequency:  Daily  Probiotic Yogurt/Smoothie Cans or Servings Per Day:  2       Frequency:  Daily (10-15-20 @ 08:59) [Active]      Patient reports: allergy to pineapple and walnuts. she avoids soy, as per her MD. pt has diabetes-requesting ensure be changed to Glucerna as per her MD.      PO intake : 50%     Source for PO intake: pt          Daily Weight in k.9 (10-11), Weight in k.5 (10-09)  % Weight Change: 2% gain since 10/11    Pertinent Medications: MEDICATIONS  (STANDING):  artificial  tears Solution 1 Drop(s) Both EYES two times a day  chlorhexidine 2% Cloths 1 Application(s) Topical daily  cycloSPORINE (RESTASIS) 0.05% Emulsion 1 Drop(s) Both EYES two times a day  dextrose 5%. 1000 milliLiter(s) (50 mL/Hr) IV Continuous <Continuous>  dextrose 50% Injectable 12.5 Gram(s) IV Push once  dextrose 50% Injectable 25 Gram(s) IV Push once  dextrose 50% Injectable 25 Gram(s) IV Push once  fondaparinux Injectable 2.5 milliGRAM(s) SubCutaneous daily  insulin lispro (HumaLOG) corrective regimen sliding scale   SubCutaneous three times a day before meals  insulin lispro (HumaLOG) corrective regimen sliding scale   SubCutaneous at bedtime  levothyroxine Injectable 75 MICROGram(s) IV Push at bedtime  lisinopril 20 milliGRAM(s) Oral daily  meropenem  IVPB 2000 milliGRAM(s) IV Intermittent every 8 hours  polyethylene glycol 3350 17 Gram(s) Oral daily  prednisoLONE acetate 1% Suspension 1 Drop(s) Both EYES two times a day  predniSONE   Tablet 5 milliGRAM(s) Oral daily  senna 2 Tablet(s) Oral at bedtime    MEDICATIONS  (PRN):  dextrose 40% Gel 15 Gram(s) Oral once PRN Blood Glucose LESS THAN 70 milliGRAM(s)/deciliter  glucagon  Injectable 1 milliGRAM(s) IntraMuscular once PRN Glucose LESS THAN 70 milligrams/deciliter  hydrALAZINE Injectable 5 milliGRAM(s) IV Push every 6 hours PRN SBP >170  simethicone 80 milliGRAM(s) Chew three times a day PRN Gas    Pertinent Labs: 10-15 @ 04:24: Na 129<L>, BUN 7, Cr <0.30<L>, <H>, K+ 3.7  10-14 @ 14:32: Na 132<L>, BUN 9, Cr <0.30<L>, <H>, K+ 4.0  10/9 A1c 7.3    Finger Sticks:  POCT Blood Glucose.: 152 mg/dL (10-15 @ 08:49)  POCT Blood Glucose.: 127 mg/dL (10-14 @ 21:40)  POCT Blood Glucose.: 102 mg/dL (10-14 @ 17:20)  POCT Blood Glucose.: 129 mg/dL (10-14 @ 14:09)  POCT Blood Glucose.: 153 mg/dL (10-14 @ 12:13)      Skin per nursing documentation: no pressure injury  Edema: +1 left wrist, right wrist    Estimated Needs:   [ x] no change since previous assessment  [ ] recalculated:     Previous Nutrition Diagnosis: none      New Nutrition Diagnosis: Decreased Nutrient Needs  related to endocrine dysfunction  as evidenced by diabetes      Recommend/ Interventions:   recommend add Consistent Carbohydrate to current diet order-pt has diabetes  change supplement to Glucerna x 2 daily  RD provided diabetes ed-monitor need for reinforcement  placed pending verification    Monitoring and Evaluation:     Continue to monitor Nutritional intake, Tolerance to diet prescription, weights, labs, skin integrity    RD remains available upon request and will follow up per protocol  Denise Hector MA, RD, CDN #[phone]

## 2020-10-15 NOTE — PROVIDER CONTACT NOTE (OTHER) - DATE AND TIME:
15-Oct-2020 14:50 Pt came in ambulatory accompanied with father. Pt reports that she feel today while playing soccer, injuring knee. On assessment, swelling to knee. No abrasions or bruising. AxO3. Denied medical conditions or allergies. All fall precautions in place.

## 2020-10-15 NOTE — PROGRESS NOTE ADULT - SUBJECTIVE AND OBJECTIVE BOX
Patient is a 70y old  Female who presents with a chief complaint of SOB (09 Oct 2020 12:09)      SUBJECTIVE / OVERNIGHT EVENTS:  No new events noted today  dizziness with nausea  Review of Systems:   MEDICATIONS  (STANDING):  artificial  tears Solution 1 Drop(s) Both EYES two times a day  cycloSPORINE (RESTASIS) 0.05% Emulsion 1 Drop(s) Both EYES two times a day  dextrose 5%. 1000 milliLiter(s) (50 mL/Hr) IV Continuous <Continuous>  dextrose 50% Injectable 12.5 Gram(s) IV Push once  dextrose 50% Injectable 25 Gram(s) IV Push once  dextrose 50% Injectable 25 Gram(s) IV Push once  insulin lispro (HumaLOG) corrective regimen sliding scale   SubCutaneous every 6 hours  levothyroxine Injectable 50 MICROGram(s) IV Push at bedtime  lisinopril 20 milliGRAM(s) Oral daily  meropenem  IVPB 2000 milliGRAM(s) IV Intermittent every 8 hours  methylPREDNISolone sodium succinate Injectable 8 milliGRAM(s) IV Push daily  potassium phosphate / sodium phosphate Powder (PHOS-NaK) 1 Packet(s) Oral two times a day before meals  prednisoLONE acetate 1% Suspension 1 Drop(s) Both EYES two times a day    MEDICATIONS  (PRN):  dextrose 40% Gel 15 Gram(s) Oral once PRN Blood Glucose LESS THAN 70 milliGRAM(s)/deciliter  glucagon  Injectable 1 milliGRAM(s) IntraMuscular once PRN Glucose LESS THAN 70 milligrams/deciliter  hydrALAZINE Injectable 5 milliGRAM(s) IV Push every 6 hours PRN SBP >170      PHYSICAL EXAM:  Vital Signs Last 24 Hrs  T(C): 36.6 (09 Oct 2020 20:00), Max: 37 (09 Oct 2020 04:00)  T(F): 97.9 (09 Oct 2020 20:00), Max: 98.6 (09 Oct 2020 04:00)  HR: 88 (09 Oct 2020 22:00) (69 - 124)  BP: 156/71 (09 Oct 2020 22:00) (116/58 - 210/99)  BP(mean): 102 (09 Oct 2020 22:00) (79 - 142)  RR: 16 (09 Oct 2020 22:00) (15 - 46)  SpO2: 98% (09 Oct 2020 22:00) (93% - 100%)  I&O's Summary    08 Oct 2020 07:01  -  09 Oct 2020 07:00  --------------------------------------------------------  IN: 491.4 mL / OUT: 640 mL / NET: -148.6 mL    09 Oct 2020 07:01  -  09 Oct 2020 22:55  --------------------------------------------------------  IN: 2670 mL / OUT: 300 mL / NET: 2370 mL      GENERAL:On a vent  HEAD:  Atraumatic, Normocephalic  EYES: EOMI, PERRLA, conjunctiva and sclera clear  NECK: Supple, No JVD  CHEST/LUNG: Clear to auscultation bilaterally; No wheeze  HEART: Regular rate and rhythm; No murmurs, rubs, or gallops  ABDOMEN: Soft, Nontender, Nondistended; Bowel sounds present  EXTREMITIES:  2+ Peripheral Pulses, No clubbing, cyanosis, or edema  PSYCH: AAOx3  NEUROLOGY: non-focal  SKIN: No rashes or lesions    LABS:  CAPILLARY BLOOD GLUCOSE      POCT Blood Glucose.: 239 mg/dL (09 Oct 2020 18:44)  POCT Blood Glucose.: 104 mg/dL (09 Oct 2020 05:11)  POCT Blood Glucose.: 151 mg/dL (08 Oct 2020 23:13)                          8.1    5.73  )-----------( 140      ( 09 Oct 2020 12:44 )             25.6     10-09    131<L>  |  101  |  10  ----------------------------<  96  3.6   |  16<L>  |  <0.30<L>    Ca    8.8      09 Oct 2020 12:44  Phos  2.1     10-09  Mg     1.6     10-09    TPro  6.0  /  Alb  2.9<L>  /  TBili  0.4  /  DBili  x   /  AST  38  /  ALT  57<H>  /  AlkPhos  41  10-09    PT/INR - ( 09 Oct 2020 00:21 )   PT: 13.6 sec;   INR: 1.14 ratio         PTT - ( 09 Oct 2020 00:21 )  PTT:30.2 sec          RADIOLOGY & ADDITIONAL TESTS:    Imaging Personally Reviewed:    Consultant(s) Notes Reviewed:      Care Discussed with Consultants/Other Providers:

## 2020-10-16 LAB
ANION GAP SERPL CALC-SCNC: 11 MMOL/L — SIGNIFICANT CHANGE UP (ref 5–17)
BUN SERPL-MCNC: 8 MG/DL — SIGNIFICANT CHANGE UP (ref 7–23)
CALCIUM SERPL-MCNC: 8.5 MG/DL — SIGNIFICANT CHANGE UP (ref 8.4–10.5)
CHLORIDE SERPL-SCNC: 97 MMOL/L — SIGNIFICANT CHANGE UP (ref 96–108)
CO2 SERPL-SCNC: 22 MMOL/L — SIGNIFICANT CHANGE UP (ref 22–31)
CREAT SERPL-MCNC: <0.3 MG/DL — LOW (ref 0.5–1.3)
GLUCOSE BLDC GLUCOMTR-MCNC: 107 MG/DL — HIGH (ref 70–99)
GLUCOSE BLDC GLUCOMTR-MCNC: 130 MG/DL — HIGH (ref 70–99)
GLUCOSE BLDC GLUCOMTR-MCNC: 132 MG/DL — HIGH (ref 70–99)
GLUCOSE BLDC GLUCOMTR-MCNC: 169 MG/DL — HIGH (ref 70–99)
GLUCOSE BLDC GLUCOMTR-MCNC: 178 MG/DL — HIGH (ref 70–99)
GLUCOSE SERPL-MCNC: 124 MG/DL — HIGH (ref 70–99)
HCT VFR BLD CALC: 29.1 % — LOW (ref 34.5–45)
HGB BLD-MCNC: 8.7 G/DL — LOW (ref 11.5–15.5)
MCHC RBC-ENTMCNC: 24.5 PG — LOW (ref 27–34)
MCHC RBC-ENTMCNC: 29.9 GM/DL — LOW (ref 32–36)
MCV RBC AUTO: 82 FL — SIGNIFICANT CHANGE UP (ref 80–100)
NRBC # BLD: 0 /100 WBCS — SIGNIFICANT CHANGE UP (ref 0–0)
PLATELET # BLD AUTO: 171 K/UL — SIGNIFICANT CHANGE UP (ref 150–400)
POTASSIUM SERPL-MCNC: 4.3 MMOL/L — SIGNIFICANT CHANGE UP (ref 3.5–5.3)
POTASSIUM SERPL-SCNC: 4.3 MMOL/L — SIGNIFICANT CHANGE UP (ref 3.5–5.3)
RBC # BLD: 3.55 M/UL — LOW (ref 3.8–5.2)
RBC # FLD: 16.1 % — HIGH (ref 10.3–14.5)
SODIUM SERPL-SCNC: 130 MMOL/L — LOW (ref 135–145)
WBC # BLD: 6.96 K/UL — SIGNIFICANT CHANGE UP (ref 3.8–10.5)
WBC # FLD AUTO: 6.96 K/UL — SIGNIFICANT CHANGE UP (ref 3.8–10.5)

## 2020-10-16 RX ORDER — ACETAMINOPHEN 500 MG
650 TABLET ORAL ONCE
Refills: 0 | Status: COMPLETED | OUTPATIENT
Start: 2020-10-16 | End: 2020-10-16

## 2020-10-16 RX ADMIN — Medication 1 DROP(S): at 17:58

## 2020-10-16 RX ADMIN — Medication 1 DROP(S): at 17:57

## 2020-10-16 RX ADMIN — MEROPENEM 200 MILLIGRAM(S): 1 INJECTION INTRAVENOUS at 13:40

## 2020-10-16 RX ADMIN — Medication 1 DROP(S): at 05:22

## 2020-10-16 RX ADMIN — Medication 650 MILLIGRAM(S): at 20:39

## 2020-10-16 RX ADMIN — POLYETHYLENE GLYCOL 3350 17 GRAM(S): 17 POWDER, FOR SOLUTION ORAL at 11:52

## 2020-10-16 RX ADMIN — FONDAPARINUX SODIUM 2.5 MILLIGRAM(S): 2.5 INJECTION, SOLUTION SUBCUTANEOUS at 11:52

## 2020-10-16 RX ADMIN — Medication 75 MICROGRAM(S): at 22:12

## 2020-10-16 RX ADMIN — MEROPENEM 200 MILLIGRAM(S): 1 INJECTION INTRAVENOUS at 22:11

## 2020-10-16 RX ADMIN — Medication 650 MILLIGRAM(S): at 20:09

## 2020-10-16 RX ADMIN — CYCLOSPORINE 1 DROP(S): 0.5 EMULSION OPHTHALMIC at 05:22

## 2020-10-16 RX ADMIN — MEROPENEM 200 MILLIGRAM(S): 1 INJECTION INTRAVENOUS at 05:21

## 2020-10-16 RX ADMIN — CYCLOSPORINE 1 DROP(S): 0.5 EMULSION OPHTHALMIC at 17:58

## 2020-10-16 RX ADMIN — Medication 1: at 13:39

## 2020-10-16 RX ADMIN — Medication 1: at 09:38

## 2020-10-16 RX ADMIN — Medication 5 MILLIGRAM(S): at 05:23

## 2020-10-16 RX ADMIN — CHLORHEXIDINE GLUCONATE 1 APPLICATION(S): 213 SOLUTION TOPICAL at 11:51

## 2020-10-16 RX ADMIN — LISINOPRIL 10 MILLIGRAM(S): 2.5 TABLET ORAL at 05:22

## 2020-10-16 NOTE — PROGRESS NOTE ADULT - SUBJECTIVE AND OBJECTIVE BOX
Neurology Progress Note    S: Patient seen and examined. No new events overnight. patient denied CP, SOB, HA. dizziness improved. still with proximal muscle weakness and pain. wants to go home     Medication:  artificial  tears Solution 1 Drop(s) Both EYES two times a day  chlorhexidine 2% Cloths 1 Application(s) Topical daily  cycloSPORINE (RESTASIS) 0.05% Emulsion 1 Drop(s) Both EYES two times a day  dextrose 40% Gel 15 Gram(s) Oral once PRN  dextrose 5%. 1000 milliLiter(s) IV Continuous <Continuous>  dextrose 50% Injectable 12.5 Gram(s) IV Push once  dextrose 50% Injectable 25 Gram(s) IV Push once  dextrose 50% Injectable 25 Gram(s) IV Push once  fondaparinux Injectable 2.5 milliGRAM(s) SubCutaneous daily  glucagon  Injectable 1 milliGRAM(s) IntraMuscular once PRN  hydrALAZINE Injectable 5 milliGRAM(s) IV Push every 6 hours PRN  insulin lispro (HumaLOG) corrective regimen sliding scale   SubCutaneous three times a day before meals  insulin lispro (HumaLOG) corrective regimen sliding scale   SubCutaneous at bedtime  levothyroxine Injectable 75 MICROGram(s) IV Push at bedtime  lisinopril 10 milliGRAM(s) Oral daily  meropenem  IVPB 2000 milliGRAM(s) IV Intermittent every 8 hours  polyethylene glycol 3350 17 Gram(s) Oral daily  prednisoLONE acetate 1% Suspension 1 Drop(s) Both EYES two times a day  predniSONE   Tablet 5 milliGRAM(s) Oral daily  senna 2 Tablet(s) Oral at bedtime  simethicone 80 milliGRAM(s) Chew three times a day PRN      Vitals:  Vital Signs Last 24 Hrs  T(C): 36.8 (16 Oct 2020 05:01), Max: 37.1 (15 Oct 2020 20:54)  T(F): 98.3 (16 Oct 2020 05:01), Max: 98.7 (15 Oct 2020 20:54)  HR: 102 (16 Oct 2020 05:01) (70 - 102)  BP: 149/84 (16 Oct 2020 05:21) (100/64 - 149/84)  BP(mean): --  RR: 18 (16 Oct 2020 05:21) (18 - 18)  SpO2: 98% (16 Oct 2020 05:21) (96% - 98%)    General Exam:   General Appearance: Appropriately dressed and in no acute distress       Head: Normocephalic, atraumatic and no dysmorphic features  Ear, Nose, and Throat: Moist mucous membranes  CVS: S1S2+  Resp: mild SOB but no wheeze  GI: soft NT/ND  Extremeties: mild LE edema no  cyanosis  Skin: some bruises from IV     Neurological Exam:  Mental Status: Awake, alert and oriented x 3.  Able to follow simple and complex verbal commands. Able to name and repeat. fluent speech. No obvious aphasia or dysarthria noted.   Cranial Nerves: PERRL, EOMI, VFFC, sensation V1-V3 intact,  no obvious facial assymetry, equal elevation of palate, scm/trap 5/5, tongue is midline on protrusion. no obvious papilledema on fundoscopic exam. hearing is grossly intact.   Motor: GAMA but proximally limited motion 1/5 in UE and LE proximally, but distally x 4 she is 4/5 (baseline?)   Sensation: Intact to light touch and pinprick throughout. no right/left confusion.   Reflexes: 1+ throughout at biceps, brachioradialis, triceps, patellars and ankles bilaterally and equal. No clonus. R toe and L toe were both downgoing.  Coordination: unable   Gait: wheelchair bound    I personally reviewed the below data/images/labs:      artificial  tears Solution 1 Drop(s) Both EYES two times a day  chlorhexidine 2% Cloths 1 Application(s) Topical daily  cycloSPORINE (RESTASIS) 0.05% Emulsion 1 Drop(s) Both EYES two times a day  dextrose 40% Gel 15 Gram(s) Oral once PRN  dextrose 5%. 1000 milliLiter(s) IV Continuous <Continuous>  dextrose 50% Injectable 12.5 Gram(s) IV Push once  dextrose 50% Injectable 25 Gram(s) IV Push once  dextrose 50% Injectable 25 Gram(s) IV Push once  fondaparinux Injectable 2.5 milliGRAM(s) SubCutaneous daily  glucagon  Injectable 1 milliGRAM(s) IntraMuscular once PRN  hydrALAZINE Injectable 5 milliGRAM(s) IV Push every 6 hours PRN  insulin lispro (HumaLOG) corrective regimen sliding scale   SubCutaneous three times a day before meals  insulin lispro (HumaLOG) corrective regimen sliding scale   SubCutaneous at bedtime  levothyroxine Injectable 75 MICROGram(s) IV Push at bedtime  lisinopril 10 milliGRAM(s) Oral daily  meropenem  IVPB 2000 milliGRAM(s) IV Intermittent every 8 hours  polyethylene glycol 3350 17 Gram(s) Oral daily  prednisoLONE acetate 1% Suspension 1 Drop(s) Both EYES two times a day  predniSONE   Tablet 5 milliGRAM(s) Oral daily  senna 2 Tablet(s) Oral at bedtime  simethicone 80 milliGRAM(s) Chew three times a day PRN    CBC Full  -  ( 16 Oct 2020 06:13 )  WBC Count : 6.96 K/uL  RBC Count : 3.55 M/uL  Hemoglobin : 8.7 g/dL  Hematocrit : 29.1 %  Platelet Count - Automated : 171 K/uL  Mean Cell Volume : 82.0 fl  Mean Cell Hemoglobin : 24.5 pg  Mean Cell Hemoglobin Concentration : 29.9 gm/dL  Auto Neutrophil # : x  Auto Lymphocyte # : x  Auto Monocyte # : x  Auto Eosinophil # : x  Auto Basophil # : x  Auto Neutrophil % : x  Auto Lymphocyte % : x  Auto Monocyte % : x  Auto Eosinophil % : x  Auto Basophil % : x    10-16    130<L>  |  97  |  8   ----------------------------<  124<H>  4.3   |  22  |  <0.30<L>    Ca    8.5      16 Oct 2020 06:13          Urinalysis Basic - ( 14 Oct 2020 15:24 )    Color: Light Yellow / Appearance: Clear / S.013 / pH: x  Gluc: x / Ketone: Trace  / Bili: Negative / Urobili: Negative   Blood: x / Protein: Trace / Nitrite: Negative   Leuk Esterase: Negative / RBC: 3 /hpf / WBC 1 /HPF   Sq Epi: x / Non Sq Epi: 2 /hpf / Bacteria: Negative            CTA chest: < from: CT Angio Chest w/ IV Cont (10.14.20 @ 21:30) >  FINDINGS:    LUNGS AND AIRWAYS: Low lung volumes. Elevated diaphragm. Interval improvement in bilateral posterior and dependent consolidations previously seen on chest CT from 10/7/2020.  PLEURA: Bilateral small left and trace right pleural effusions.  MEDIASTINUM AND ROSEMARY: No lymphadenopathy.  VESSELS: The main pulmonary artery measures 2.1 cm. There is no main, central, lobar, or segmental pulmonary embolus. Right upper extremity PICC with tip terminating in the right atrium. Calcified and noncalcified ulcerative plaque involving the aorta.  HEART: Heart size is normal. No pericardial effusion. Coronary arterial calcifications. No signs of right heart strain.  CHEST WALL AND LOWER NECK: Within normal limits.  VISUALIZED UPPER ABDOMEN: Embolization coil mass in the gastrohepatic space.  BONES: Degenerative changes.    IMPRESSION:    No main, lobar, or segmental pulmonary embolism. Limited evaluation the subsegmental pulmonary arteries due to respiratory motion.    Improvement in bilateral atelectasis seen on chest CT from 10/7/2020. Small left and trace right pleural effusions.      CTH 10/14/20  IMPRESSION: No acute intracranial hemorrhage, mass effect, or shift of the midline structures.    Mild interval advancement of chronic white matter microvascular type changes.    TTE: 1. Calcified trileaflet aortic valve with decreased  opening. Peak transaortic valve gradient equals 16 mm Hg,  mean transaortic valve gradient equals 8 mm Hg, estimated  aortic valve area equals 1.6 sqcm (by continuity equation),  aortic valve velocity time integral equals 28 cm,  consistent with mild aortic stenosis.  2. Increased relative wall thickness with normal left  ventricular mass index, consistent with concentric left  ventricular remodeling.  3. Normal left ventricular systolic function. No segmental  wall motion abnormalities.  4. Normal right ventricular size and function.  *** No previous Echo exam.  Unable to rule out endocarditis, consider JAIME if clinicaly  indicated.  Hgb A1c 7.3

## 2020-10-16 NOTE — PROGRESS NOTE ADULT - ASSESSMENT
72 yo female with RA, DM, HTN, fibromyalgia, immune suppressed at home on Enbrel and 5 mg of prednisone bid  and remote repair of cerebral aneurysm now admitted with fever, weakness and hypoxia.  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  Required vent & pressors support in ICU for shock - shock resolved.   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  She recalls a rash to PCN, therefor is on meropenem which she appears to be tolerating  RVP is negative, MRSA screen positive.  TTE with mild AS, no vegetations.  Repeat 10/9 blood cultures are NG to date  She had developed pancytopenia, now improved.  S/p RUE PICC placement   Transient fever of 100.4 F on 10/14 - was pancultured  CTA chest neg for PE, CTH no acute event  Urine cx no growth     Suggest:  Continue meropenem for listeria bacteremia and concerns of pneumonia  Anticipate a 3 wks course of antibiotics total & today is D # 9/21  F/u cultures

## 2020-10-16 NOTE — PROGRESS NOTE ADULT - SUBJECTIVE AND OBJECTIVE BOX
Patient denies chest pain or shortness of breath.   Review of systems otherwise (-)  	    MEDICATIONS  (STANDING):  artificial  tears Solution 1 Drop(s) Both EYES two times a day  chlorhexidine 2% Cloths 1 Application(s) Topical daily  cycloSPORINE (RESTASIS) 0.05% Emulsion 1 Drop(s) Both EYES two times a day  dextrose 5%. 1000 milliLiter(s) (50 mL/Hr) IV Continuous <Continuous>  dextrose 50% Injectable 12.5 Gram(s) IV Push once  dextrose 50% Injectable 25 Gram(s) IV Push once  dextrose 50% Injectable 25 Gram(s) IV Push once  fondaparinux Injectable 2.5 milliGRAM(s) SubCutaneous daily  insulin lispro (HumaLOG) corrective regimen sliding scale   SubCutaneous at bedtime  insulin lispro (HumaLOG) corrective regimen sliding scale   SubCutaneous three times a day before meals  levothyroxine Injectable 75 MICROGram(s) IV Push at bedtime  lisinopril 10 milliGRAM(s) Oral daily  meropenem  IVPB 2000 milliGRAM(s) IV Intermittent every 8 hours  polyethylene glycol 3350 17 Gram(s) Oral daily  prednisoLONE acetate 1% Suspension 1 Drop(s) Both EYES two times a day  predniSONE   Tablet 5 milliGRAM(s) Oral daily  senna 2 Tablet(s) Oral at bedtime      LABS:	 	                          8.7    6.96  )-----------( 171      ( 16 Oct 2020 06:13 )             29.1     Hemoglobin: 8.7 g/dL (10-16 @ 06:13)  Hemoglobin: 8.4 g/dL (10-15 @ 04:24)  Hemoglobin: 7.4 g/dL (10-14 @ 14:32)  Hemoglobin: 8.1 g/dL (10-14 @ 06:54)  Hemoglobin: 8.0 g/dL (10-13 @ 06:47)    10-16    130<L>  |  97  |  8   ----------------------------<  124<H>  4.3   |  22  |  <0.30<L>    Ca    8.5      16 Oct 2020 06:13      Creatinine Trend: <0.30<--, <0.30<--, <0.30<--, <0.30<--, <0.30<--, <0.30<--  COAGS:       proBNP:   Lipid Profile:   HgA1c:   TSH:     PHYSICAL EXAM:  T(C): 36.6 (10-16-20 @ 13:00), Max: 37.1 (10-15-20 @ 20:54)  HR: 99 (10-16-20 @ 13:00) (70 - 102)  BP: 100/66 (10-16-20 @ 13:00) (100/66 - 149/84)  RR: 18 (10-16-20 @ 13:00) (18 - 18)  SpO2: 97% (10-16-20 @ 13:00) (96% - 98%)  Wt(kg): --  I&O's Summary    15 Oct 2020 07:01  -  16 Oct 2020 07:00  --------------------------------------------------------  IN: 780 mL / OUT: 1200 mL / NET: -420 mL    16 Oct 2020 07:01  -  16 Oct 2020 13:26  --------------------------------------------------------  IN: 180 mL / OUT: 0 mL / NET: 180 mL          Gen: Appears well in NAD  HEENT:  (-)icterus (-)pallor  CV: N S1 S2 1/6 JUAN (+)2 Pulses B/l  Resp:  Clear to ausculatation B/L, normal effort  GI: (+) BS Soft, NT, ND  Lymph:  (-)Edema, (-)obvious lymphadenopathy  Skin: Warm to touch, Normal turgor  Psych: Appropriate mood and affect      TELEMETRY: SR/ST 	      ECG:  Sinus 86 BPM, LVH nonspecific T wave abnormalities     Echo: Pending    ASSESSMENT/PLAN: 70y Female  history of RA, DM, HTN, fibromyalgia, hypothyroidism, brain aneurysm s/p repair BIBEMS for shortness of breath and fever PNA with an episode of hypotension and (+) Trop.    - Elevated troponin with negative CK - ? Subendocardial ischemia in the setting of hypotension - doubt ACS  - Check Repeat echo now that patient is extubated  - Pulmonary f/u  - Continue Abx per ID  - Further recs pending above    Lee Encinas PA-C  Pager: [phone]

## 2020-10-16 NOTE — PROGRESS NOTE ADULT - SUBJECTIVE AND OBJECTIVE BOX
Follow-up Pulm Progress Note    No new respiratory events overnight.  Denies SOB/CP.   Sats 98% RA    Medications:  MEDICATIONS  (STANDING):  artificial  tears Solution 1 Drop(s) Both EYES two times a day  chlorhexidine 2% Cloths 1 Application(s) Topical daily  cycloSPORINE (RESTASIS) 0.05% Emulsion 1 Drop(s) Both EYES two times a day  dextrose 5%. 1000 milliLiter(s) (50 mL/Hr) IV Continuous <Continuous>  dextrose 50% Injectable 12.5 Gram(s) IV Push once  dextrose 50% Injectable 25 Gram(s) IV Push once  dextrose 50% Injectable 25 Gram(s) IV Push once  fondaparinux Injectable 2.5 milliGRAM(s) SubCutaneous daily  insulin lispro (HumaLOG) corrective regimen sliding scale   SubCutaneous three times a day before meals  insulin lispro (HumaLOG) corrective regimen sliding scale   SubCutaneous at bedtime  levothyroxine Injectable 75 MICROGram(s) IV Push at bedtime  lisinopril 10 milliGRAM(s) Oral daily  meropenem  IVPB 2000 milliGRAM(s) IV Intermittent every 8 hours  polyethylene glycol 3350 17 Gram(s) Oral daily  prednisoLONE acetate 1% Suspension 1 Drop(s) Both EYES two times a day  predniSONE   Tablet 5 milliGRAM(s) Oral daily  senna 2 Tablet(s) Oral at bedtime    MEDICATIONS  (PRN):  dextrose 40% Gel 15 Gram(s) Oral once PRN Blood Glucose LESS THAN 70 milliGRAM(s)/deciliter  glucagon  Injectable 1 milliGRAM(s) IntraMuscular once PRN Glucose LESS THAN 70 milligrams/deciliter  hydrALAZINE Injectable 5 milliGRAM(s) IV Push every 6 hours PRN SBP >170  simethicone 80 milliGRAM(s) Chew three times a day PRN Gas          Vital Signs Last 24 Hrs  T(C): 36.8 (16 Oct 2020 05:01), Max: 37.1 (15 Oct 2020 20:54)  T(F): 98.3 (16 Oct 2020 05:01), Max: 98.7 (15 Oct 2020 20:54)  HR: 102 (16 Oct 2020 05:01) (70 - 102)  BP: 149/84 (16 Oct 2020 05:21) (100/64 - 149/84)  BP(mean): --  RR: 18 (16 Oct 2020 05:21) (18 - 18)  SpO2: 98% (16 Oct 2020 05:21) (96% - 98%)    ABG - ( 14 Oct 2020 14:32 )  pH, Arterial: 7.44  pH, Blood: x     /  pCO2: 39    /  pO2: 150   / HCO3: 27    / Base Excess: 2.8   /  SaO2: 100                   10-15 @ 07:01  -  10-16 @ 07:00  --------------------------------------------------------  IN: 780 mL / OUT: 1200 mL / NET: -420 mL          LABS:                        8.7    6.96  )-----------( 171      ( 16 Oct 2020 06:13 )             29.1     10-16    130<L>  |  97  |  8   ----------------------------<  124<H>  4.3   |  22  |  <0.30<L>    Ca    8.5      16 Oct 2020 06:13        CARDIAC MARKERS ( 15 Oct 2020 04:24 )  x     / x     / 36 U/L / x     / 1.6 ng/mL  CARDIAC MARKERS ( 14 Oct 2020 14:32 )  x     / x     / 24 U/L / x     / 4.2 ng/mL      CAPILLARY BLOOD GLUCOSE      POCT Blood Glucose.: 169 mg/dL (16 Oct 2020 09:36)      Urinalysis Basic - ( 14 Oct 2020 15:24 )    Color: Light Yellow / Appearance: Clear / S.013 / pH: x  Gluc: x / Ketone: Trace  / Bili: Negative / Urobili: Negative   Blood: x / Protein: Trace / Nitrite: Negative   Leuk Esterase: Negative / RBC: 3 /hpf / WBC 1 /HPF   Sq Epi: x / Non Sq Epi: 2 /hpf / Bacteria: Negative          CULTURES:    Culture - Blood (collected 10-14-20 @ 17:44)  Source: .Blood Blood  Preliminary Report (10-15-20 @ 18:02):    No growth to date.    Culture - Blood (collected 10-14-20 @ 17:44)  Source: .Blood Blood  Preliminary Report (10-15-20 @ 18:02):    No growth to date.    Culture - Blood (collected 10-09-20 @ 02:19)  Source: .Blood Blood-Venous  Final Report (10-14-20 @ 03:01):    No Growth Final    Culture - Blood (collected 10-09-20 @ 02:19)  Source: .Blood Blood-Peripheral  Final Report (10-14-20 @ 03:01):    No Growth Final    Culture - Blood (collected 10-07-20 @ 15:07)  Source: .Blood Blood-Peripheral  Gram Stain (10-08-20 @ 13:24):    Growth in anaerobic bottle: Gram Positive Rods    Growth in aerobic bottle: Gram Positive Rods  Final Report (10-10-20 @ 10:12):    Listeria monocytogenes    Resistance to ampicillin or penicillin for Listeria    monocytogenes has not been described. L. monocytogenes is intrinsically    resistant to cephalosporins.    "Due to technical problems, Proteus sp. will Not be reported as partof    the BCID panel until further notice"    ***Blood Panel PCR results on this specimen are available    approximately 3 hours after the Gram stain result.***    Gram stain, PCR, and/or culture results may not always    correspond due to difference in methodologies.    ************************************************************    This PCR assay was performed using [employer].    The following targets are tested for: Enterococcus,    vancomycin resistant enterococci, Listeria monocytogenes,    coagulase negative staphylococci, S. aureus,    methicillin resistant S. aureus, Streptococcus agalactiae    (Group B), S. pneumoniae, S. pyogenes (Group A),    Acinetobacter baumannii, Enterobacter cloacae, E. coli,    Klebsiella oxytoca, K. pneumoniae, Proteus sp.,    Serratia marcescens, Haemophilus influenzae,    Neisseria meningitidis, Pseudomonas aeruginosa, Candida    albicans, C. glabrata, C krusei, C parapsilosis,    C. tropicalis and the KPC resistance gene.  Organism: Blood Culture PCR (10-10-20 @ 10:12)  Organism: Blood Culture PCR (10-10-20 @ 10:12)      -  Listeria monocytogenes: Detec      Method Type: PCR    Culture - Blood (collected 10-07-20 @ 15:07)  Source: .Blood Blood-Peripheral  Gram Stain (10-08-20 @ 21:16):    Growth in anaerobic bottle: Gram Positive Rods    Growth in aerobic bottle: Gram Positive Rods  Final Report (10-09-20 @ 11:03):    Growth in aerobic and anaerobic bottles: Listeria monocytogenes    Resistance to ampicillin or penicillin for Listeria    monocytogenes has not been described. L. monocytogenes is intrinsically    resistant to cephalosporins.        Culture - Urine (collected 10-14-20 @ 18:50)  Source: .Urine Catheterized  Final Report (10-15-20 @ 17:01):    No growth    Culture - Urine (collected 10-07-20 @ 15:26)  Source: .Urine Clean Catch (Midstream)  Final Report (10-09-20 @ 08:38):    >=3 organisms. Probable collection contamination.          Physical Examination:  PULM: Clear to auscultation bilaterally, no significant sputum production  CVS: RRR    RADIOLOGY REVIEWED  CT chest: < from: CT Angio Chest w/ IV Cont (10.14.20 @ 21:30) >  FINDINGS:    LUNGS AND AIRWAYS: Low lung volumes. Elevated diaphragm. Interval improvement in bilateral posterior and dependent consolidations previously seen on chest CT from 10/7/2020.  PLEURA: Bilateral small left and trace right pleural effusions.  MEDIASTINUM AND ROSEMARY: No lymphadenopathy.  VESSELS: The main pulmonary artery measures 2.1 cm. There is no main, central, lobar, or segmental pulmonary embolus. Right upper extremity PICC with tip terminating in the right atrium. Calcified and noncalcified ulcerative plaque involving the aorta.  HEART: Heart size is normal. No pericardial effusion. Coronary arterial calcifications. No signs of right heart strain.  CHEST WALL AND LOWER NECK: Within normal limits.  VISUALIZED UPPER ABDOMEN: Embolization coil mass in the gastrohepatic space.  BONES: Degenerative changes.    IMPRESSION:    No main, lobar, or segmental pulmonary embolism. Limited evaluation the subsegmental pulmonary arteries due to respiratory motion.    Improvement in bilateral atelectasis seen on chest CT from 10/7/2020. Small left and trace right pleural effusions.    < end of copied text >

## 2020-10-16 NOTE — PROGRESS NOTE ADULT - ASSESSMENT
This is a 70/F w/ RA, DM, HTN, fibromyalgia, hypothyroidism, brain aneurysm s/p repair BIBEMS for AMS.  Found in respiratory distress likely 2/2 multifocal PNA, intubated for hypoxemic respiratory failure.  Extubated in MICU w/o complication.  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  SHe is a vegetarian and gets her protein intake from nonanimal meats.  This could also be from hypotension that will lead to pre-renal azotemia(and decrease in ability to excrete free water)  Urine indices are not c/w SIADH     1 Rhem-At present no need for stress steroids but if hypotension happens again then IV steroids  2 Renal- Increase Glucerna to tid and no salt restriction; The abx has a large salt load which will help ;  Serum sodium is improving   3 ID-IV abx   4 CVS-Reduce the lisinopril to 10mg and will monitor the BP.  (up and down).  If the BP remains elevated then will increase the Lisinopril back to 20mg in am     Sayed [facility]   [phone]

## 2020-10-16 NOTE — PROGRESS NOTE ADULT - SUBJECTIVE AND OBJECTIVE BOX
CC: f/u for Listeria bacteremia    Patient reports feeling well & wants to go home     REVIEW OF SYSTEMS:  All other review of systems negative (Comprehensive ROS)    Antimicrobials Day #  :   meropenem  IVPB 2000 milliGRAM(s) IV Intermittent every 8 hours    Other Medications Reviewed    T(F): 97.9 (10-16-20 @ 13:00), Max: 98.7 (10-15-20 @ 20:54)  HR: 99 (10-16-20 @ 13:00)  BP: 100/66 (10-16-20 @ 13:00)  RR: 18 (10-16-20 @ 13:00)  SpO2: 97% (10-16-20 @ 13:00)  Wt(kg): --    PHYSICAL EXAM:  General: alert, no acute distress  Eyes:  anicteric, no conjunctival injection, no discharge  Oropharynx: no lesions or injection 	  Neck: supple, without adenopathy  Lungs: clear to auscultation  Heart: regular rate and rhythm; no murmurs  Abdomen: soft, nondistended, nontender.  Skin: no lesions  Extremities: no clubbing or cyanosis. + edema                    RUE PICC   Neurologic: alert & oriented    LAB RESULTS:                        8.7    6.96  )-----------( 171      ( 16 Oct 2020 06:13 )             29.1     10-16    130<L>  |  97  |  8   ----------------------------<  124<H>  4.3   |  22  |  <0.30<L>    Ca    8.5      16 Oct 2020 06:13      Urinalysis Basic - ( 14 Oct 2020 15:24 )    Color: Light Yellow / Appearance: Clear / S.013 / pH: x  Gluc: x / Ketone: Trace  / Bili: Negative / Urobili: Negative   Blood: x / Protein: Trace / Nitrite: Negative   Leuk Esterase: Negative / RBC: 3 /hpf / WBC 1 /HPF   Sq Epi: x / Non Sq Epi: 2 /hpf / Bacteria: Negative      MICROBIOLOGY:  RECENT CULTURES:  10-14 @ 18:50 .Urine Catheterized     No growth      10-14 @ 17:44 .Blood Blood     No growth to date.            RADIOLOGY REVIEWED:

## 2020-10-16 NOTE — PROGRESS NOTE ADULT - SUBJECTIVE AND OBJECTIVE BOX
Patient is a 70y old  Female who presents with a chief complaint of SOB (09 Oct 2020 12:09)      SUBJECTIVE / OVERNIGHT EVENTS:  No new events noted today  Gen MSK body pains reported  Review of Systems:   MEDICATIONS  (STANDING):  artificial  tears Solution 1 Drop(s) Both EYES two times a day  cycloSPORINE (RESTASIS) 0.05% Emulsion 1 Drop(s) Both EYES two times a day  dextrose 5%. 1000 milliLiter(s) (50 mL/Hr) IV Continuous <Continuous>  dextrose 50% Injectable 12.5 Gram(s) IV Push once  dextrose 50% Injectable 25 Gram(s) IV Push once  dextrose 50% Injectable 25 Gram(s) IV Push once  insulin lispro (HumaLOG) corrective regimen sliding scale   SubCutaneous every 6 hours  levothyroxine Injectable 50 MICROGram(s) IV Push at bedtime  lisinopril 20 milliGRAM(s) Oral daily  meropenem  IVPB 2000 milliGRAM(s) IV Intermittent every 8 hours  methylPREDNISolone sodium succinate Injectable 8 milliGRAM(s) IV Push daily  potassium phosphate / sodium phosphate Powder (PHOS-NaK) 1 Packet(s) Oral two times a day before meals  prednisoLONE acetate 1% Suspension 1 Drop(s) Both EYES two times a day    MEDICATIONS  (PRN):  dextrose 40% Gel 15 Gram(s) Oral once PRN Blood Glucose LESS THAN 70 milliGRAM(s)/deciliter  glucagon  Injectable 1 milliGRAM(s) IntraMuscular once PRN Glucose LESS THAN 70 milligrams/deciliter  hydrALAZINE Injectable 5 milliGRAM(s) IV Push every 6 hours PRN SBP >170      PHYSICAL EXAM:  Vital Signs Last 24 Hrs  T(C): 36.6 (09 Oct 2020 20:00), Max: 37 (09 Oct 2020 04:00)  T(F): 97.9 (09 Oct 2020 20:00), Max: 98.6 (09 Oct 2020 04:00)  HR: 88 (09 Oct 2020 22:00) (69 - 124)  BP: 156/71 (09 Oct 2020 22:00) (116/58 - 210/99)  BP(mean): 102 (09 Oct 2020 22:00) (79 - 142)  RR: 16 (09 Oct 2020 22:00) (15 - 46)  SpO2: 98% (09 Oct 2020 22:00) (93% - 100%)  I&O's Summary    08 Oct 2020 07:01  -  09 Oct 2020 07:00  --------------------------------------------------------  IN: 491.4 mL / OUT: 640 mL / NET: -148.6 mL    09 Oct 2020 07:01  -  09 Oct 2020 22:55  --------------------------------------------------------  IN: 2670 mL / OUT: 300 mL / NET: 2370 mL      GENERAL:On a vent  HEAD:  Atraumatic, Normocephalic  EYES: EOMI, PERRLA, conjunctiva and sclera clear  NECK: Supple, No JVD  CHEST/LUNG: Clear to auscultation bilaterally; No wheeze  HEART: Regular rate and rhythm; No murmurs, rubs, or gallops  ABDOMEN: Soft, Nontender, Nondistended; Bowel sounds present  EXTREMITIES:  2+ Peripheral Pulses, No clubbing, cyanosis, or edema  PSYCH: AAOx3  NEUROLOGY: non-focal. Gen weakness, bed bound. Power in all extremities is   SKIN: No rashes or lesions/    LABS:  CAPILLARY BLOOD GLUCOSE      POCT Blood Glucose.: 239 mg/dL (09 Oct 2020 18:44)  POCT Blood Glucose.: 104 mg/dL (09 Oct 2020 05:11)  POCT Blood Glucose.: 151 mg/dL (08 Oct 2020 23:13)                          8.1    5.73  )-----------( 140      ( 09 Oct 2020 12:44 )             25.6     10-09    131<L>  |  101  |  10  ----------------------------<  96  3.6   |  16<L>  |  <0.30<L>    Ca    8.8      09 Oct 2020 12:44  Phos  2.1     10-09  Mg     1.6     10-09    TPro  6.0  /  Alb  2.9<L>  /  TBili  0.4  /  DBili  x   /  AST  38  /  ALT  57<H>  /  AlkPhos  41  10-09    PT/INR - ( 09 Oct 2020 00:21 )   PT: 13.6 sec;   INR: 1.14 ratio         PTT - ( 09 Oct 2020 00:21 )  PTT:30.2 sec          RADIOLOGY & ADDITIONAL TESTS:    Imaging Personally Reviewed:    Consultant(s) Notes Reviewed:      Care Discussed with Consultants/Other Providers:

## 2020-10-16 NOTE — PROGRESS NOTE ADULT - SUBJECTIVE AND OBJECTIVE BOX
NEPHROLOGY-[facility] [phone]        Patient seen and examined in bed.  She was about the  same         MEDICATIONS  (STANDING):  artificial  tears Solution 1 Drop(s) Both EYES two times a day  chlorhexidine 2% Cloths 1 Application(s) Topical daily  cycloSPORINE (RESTASIS) 0.05% Emulsion 1 Drop(s) Both EYES two times a day  dextrose 5%. 1000 milliLiter(s) (50 mL/Hr) IV Continuous <Continuous>  dextrose 50% Injectable 12.5 Gram(s) IV Push once  dextrose 50% Injectable 25 Gram(s) IV Push once  dextrose 50% Injectable 25 Gram(s) IV Push once  fondaparinux Injectable 2.5 milliGRAM(s) SubCutaneous daily  insulin lispro (HumaLOG) corrective regimen sliding scale   SubCutaneous three times a day before meals  insulin lispro (HumaLOG) corrective regimen sliding scale   SubCutaneous at bedtime  levothyroxine Injectable 75 MICROGram(s) IV Push at bedtime  lisinopril 10 milliGRAM(s) Oral daily  meropenem  IVPB 2000 milliGRAM(s) IV Intermittent every 8 hours  polyethylene glycol 3350 17 Gram(s) Oral daily  prednisoLONE acetate 1% Suspension 1 Drop(s) Both EYES two times a day  predniSONE   Tablet 5 milliGRAM(s) Oral daily  senna 2 Tablet(s) Oral at bedtime      VITAL:  T(C): , Max: 37.1 (10-15-20 @ 20:54)  T(F): , Max: 98.7 (10-15-20 @ 20:54)  HR: 102 (10-16-20 @ 05:01)  BP: 149/84 (10-16-20 @ 05:21)  BP(mean): --  RR: 18 (10-16-20 @ 05:21)  SpO2: 98% (10-16-20 @ 05:21)  Wt(kg): --    I and O's:    10-15 @ 07:01  -  10-16 @ 07:00  --------------------------------------------------------  IN: 780 mL / OUT: 1200 mL / NET: -420 mL          PHYSICAL EXAM:    Constitutional: NAD  Neck:  No JVD  Respiratory: CTAB/L  Cardiovascular: S1 and S2  Gastrointestinal: BS+, soft, NT/ND  Extremities: No peripheral edema  Neurological: A/O x 3, no focal deficits  Psychiatric: Normal mood, normal affect  : No Mcbride  Skin: No rashes  Access: Not applicable    LABS:                        8.7    6.96  )-----------( 171      ( 16 Oct 2020 06:13 )             29.1     10-16    130<L>  |  97  |  8   ----------------------------<  124<H>  4.3   |  22  |  <0.30<L>    Ca    8.5      16 Oct 2020 06:13            Urine Studies:  Urinalysis Basic - ( 14 Oct 2020 15:24 )    Color: Light Yellow / Appearance: Clear / S.013 / pH: x  Gluc: x / Ketone: Trace  / Bili: Negative / Urobili: Negative   Blood: x / Protein: Trace / Nitrite: Negative   Leuk Esterase: Negative / RBC: 3 /hpf / WBC 1 /HPF   Sq Epi: x / Non Sq Epi: 2 /hpf / Bacteria: Negative      Sodium, Random Urine: 97 mmol/L (10-15 @ 22:09)  Osmolality, Random Urine: 332 mos/kg (10-15 @ 22:09)  Potassium, Random Urine: 22 mmol/L (10-15 @ 22:09)        RADIOLOGY & ADDITIONAL STUDIES:

## 2020-10-16 NOTE — PROVIDER CONTACT NOTE (OTHER) - BACKGROUND
Patient admitted for Pneumonia of left lower lobe due to infectious organism. PMH of DM, RA, Hypothyroid, and fibromyalgia.

## 2020-10-16 NOTE — PROGRESS NOTE ADULT - ASSESSMENT
71 yo F with history of RA, DM, HTN, fibromyalgia, hypothyroidism, brain aneurysm s/p repair (2004?) BIBEMS for shortness of breath and fever. Per EMS, patient has had 3 days of fever, Tmax 102.5 , no known COVID exposures, took tylenol at unknown time before being brought in. patient didn't take her med for 2 days prior to admission. Patient c/o feeling fatigued, short of breath. Denies chest pain. Patient uses a wheelchair at baseline 2/2 arthritis. Intubated and in ICU extubated on 10/9 then transferred to floor.  Neurology called for dizzy sensation and h/o aneurysm.  Na improving 129-->130 today, Hgb also improving. 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 CTH obtained unremarkable. TTE with LVH, A1c7.3%  o/e proximal weakness> distal. Dizziness may be multifactorial 2/2 anemia and hyponatremia. dizziness now improved.   - need to better eval h/o aneurysm? CTA H/N   - dizziness improved no need for MRI brain.   - hyponatremia Na 129 -->130 today, improving. slow correction  - monitor Hgb improved   - f/u TFTs  - check orthostatics if she is able to sit up. wheelchair bound  - telemetry  - PT/OT, OOBC  - check FS, glucose control <180  - GI/DVT ppx  - Counseling on diet, exercise, and medication adherence was done  - Counseling on smoking cessation and alcohol consumption offered when appropriate.  - Pain assessed and judicious use of narcotics when appropriate was discussed.    - Stroke education given when appropriate.  - Importance of fall prevention discussed.   - Differential diagnosis and plan of care discussed with patient and/or family and primary team  - Thank you for allowing me to participate in the care of this patient. Call with questions.   Dez Madera MD  Vascular Neurology  Office: [phone]

## 2020-10-17 LAB
ANION GAP SERPL CALC-SCNC: 8 MMOL/L — SIGNIFICANT CHANGE UP (ref 5–17)
APPEARANCE UR: CLEAR — SIGNIFICANT CHANGE UP
BACTERIA # UR AUTO: NEGATIVE — SIGNIFICANT CHANGE UP
BILIRUB UR-MCNC: NEGATIVE — SIGNIFICANT CHANGE UP
BUN SERPL-MCNC: 11 MG/DL — SIGNIFICANT CHANGE UP (ref 7–23)
CALCIUM SERPL-MCNC: 9 MG/DL — SIGNIFICANT CHANGE UP (ref 8.4–10.5)
CHLORIDE SERPL-SCNC: 99 MMOL/L — SIGNIFICANT CHANGE UP (ref 96–108)
CO2 SERPL-SCNC: 25 MMOL/L — SIGNIFICANT CHANGE UP (ref 22–31)
COLOR SPEC: COLORLESS — SIGNIFICANT CHANGE UP
CREAT SERPL-MCNC: <0.3 MG/DL — LOW (ref 0.5–1.3)
DIFF PNL FLD: NEGATIVE — SIGNIFICANT CHANGE UP
EPI CELLS # UR: 0 /HPF — SIGNIFICANT CHANGE UP
GLUCOSE BLDC GLUCOMTR-MCNC: 103 MG/DL — HIGH (ref 70–99)
GLUCOSE BLDC GLUCOMTR-MCNC: 118 MG/DL — HIGH (ref 70–99)
GLUCOSE BLDC GLUCOMTR-MCNC: 143 MG/DL — HIGH (ref 70–99)
GLUCOSE BLDC GLUCOMTR-MCNC: 155 MG/DL — HIGH (ref 70–99)
GLUCOSE BLDC GLUCOMTR-MCNC: 162 MG/DL — HIGH (ref 70–99)
GLUCOSE SERPL-MCNC: 110 MG/DL — HIGH (ref 70–99)
GLUCOSE UR QL: NEGATIVE — SIGNIFICANT CHANGE UP
KETONES UR-MCNC: ABNORMAL
LEUKOCYTE ESTERASE UR-ACNC: NEGATIVE — SIGNIFICANT CHANGE UP
NITRITE UR-MCNC: NEGATIVE — SIGNIFICANT CHANGE UP
PH UR: 7.5 — SIGNIFICANT CHANGE UP (ref 5–8)
POTASSIUM SERPL-MCNC: 3.9 MMOL/L — SIGNIFICANT CHANGE UP (ref 3.5–5.3)
POTASSIUM SERPL-SCNC: 3.9 MMOL/L — SIGNIFICANT CHANGE UP (ref 3.5–5.3)
PROT UR-MCNC: ABNORMAL
RBC CASTS # UR COMP ASSIST: 1 /HPF — SIGNIFICANT CHANGE UP (ref 0–4)
SODIUM SERPL-SCNC: 132 MMOL/L — LOW (ref 135–145)
SP GR SPEC: 1.01 — LOW (ref 1.01–1.02)
UROBILINOGEN FLD QL: NEGATIVE — SIGNIFICANT CHANGE UP
WBC UR QL: 0 /HPF — SIGNIFICANT CHANGE UP (ref 0–5)

## 2020-10-17 PROCEDURE — 76376 3D RENDER W/INTRP POSTPROCES: CPT | Mod: 26

## 2020-10-17 PROCEDURE — 93308 TTE F-UP OR LMTD: CPT | Mod: 26

## 2020-10-17 PROCEDURE — 93321 DOPPLER ECHO F-UP/LMTD STD: CPT | Mod: 26

## 2020-10-17 RX ORDER — ACETAMINOPHEN 500 MG
1000 TABLET ORAL ONCE
Refills: 0 | Status: DISCONTINUED | OUTPATIENT
Start: 2020-10-17 | End: 2020-10-17

## 2020-10-17 RX ORDER — ACETAMINOPHEN 500 MG
650 TABLET ORAL ONCE
Refills: 0 | Status: COMPLETED | OUTPATIENT
Start: 2020-10-17 | End: 2020-10-17

## 2020-10-17 RX ORDER — TRAMADOL HYDROCHLORIDE 50 MG/1
50 TABLET ORAL
Refills: 0 | Status: DISCONTINUED | OUTPATIENT
Start: 2020-10-17 | End: 2020-10-23

## 2020-10-17 RX ORDER — LISINOPRIL 2.5 MG/1
2.5 TABLET ORAL DAILY
Refills: 0 | Status: DISCONTINUED | OUTPATIENT
Start: 2020-10-17 | End: 2020-10-19

## 2020-10-17 RX ORDER — METOPROLOL TARTRATE 50 MG
12.5 TABLET ORAL DAILY
Refills: 0 | Status: DISCONTINUED | OUTPATIENT
Start: 2020-10-17 | End: 2020-10-19

## 2020-10-17 RX ADMIN — Medication 650 MILLIGRAM(S): at 11:45

## 2020-10-17 RX ADMIN — Medication 1 DROP(S): at 17:23

## 2020-10-17 RX ADMIN — MEROPENEM 200 MILLIGRAM(S): 1 INJECTION INTRAVENOUS at 06:37

## 2020-10-17 RX ADMIN — Medication 75 MICROGRAM(S): at 21:59

## 2020-10-17 RX ADMIN — Medication 260 MILLIGRAM(S): at 11:14

## 2020-10-17 RX ADMIN — MEROPENEM 200 MILLIGRAM(S): 1 INJECTION INTRAVENOUS at 15:16

## 2020-10-17 RX ADMIN — LISINOPRIL 10 MILLIGRAM(S): 2.5 TABLET ORAL at 05:50

## 2020-10-17 RX ADMIN — Medication 5 MILLIGRAM(S): at 05:50

## 2020-10-17 RX ADMIN — MEROPENEM 200 MILLIGRAM(S): 1 INJECTION INTRAVENOUS at 21:59

## 2020-10-17 RX ADMIN — Medication 1: at 09:58

## 2020-10-17 RX ADMIN — CHLORHEXIDINE GLUCONATE 1 APPLICATION(S): 213 SOLUTION TOPICAL at 13:13

## 2020-10-17 RX ADMIN — CYCLOSPORINE 1 DROP(S): 0.5 EMULSION OPHTHALMIC at 17:23

## 2020-10-17 RX ADMIN — SENNA PLUS 2 TABLET(S): 8.6 TABLET ORAL at 21:59

## 2020-10-17 RX ADMIN — Medication 1 DROP(S): at 05:49

## 2020-10-17 RX ADMIN — CYCLOSPORINE 1 DROP(S): 0.5 EMULSION OPHTHALMIC at 05:49

## 2020-10-17 RX ADMIN — Medication 1 DROP(S): at 05:57

## 2020-10-17 RX ADMIN — FONDAPARINUX SODIUM 2.5 MILLIGRAM(S): 2.5 INJECTION, SOLUTION SUBCUTANEOUS at 17:24

## 2020-10-17 NOTE — PROGRESS NOTE ADULT - SUBJECTIVE AND OBJECTIVE BOX
NEPHROLOGY     Patient seen and examined. Pt reports feeling fine, though complained of headache earlier, no cp or sob, in no acute distress. No overnight events noted.     MEDICATIONS  (STANDING):  artificial  tears Solution 1 Drop(s) Both EYES two times a day  chlorhexidine 2% Cloths 1 Application(s) Topical daily  cycloSPORINE (RESTASIS) 0.05% Emulsion 1 Drop(s) Both EYES two times a day  dextrose 5%. 1000 milliLiter(s) (50 mL/Hr) IV Continuous <Continuous>  dextrose 50% Injectable 12.5 Gram(s) IV Push once  dextrose 50% Injectable 25 Gram(s) IV Push once  dextrose 50% Injectable 25 Gram(s) IV Push once  fondaparinux Injectable 2.5 milliGRAM(s) SubCutaneous daily  insulin lispro (HumaLOG) corrective regimen sliding scale   SubCutaneous three times a day before meals  insulin lispro (HumaLOG) corrective regimen sliding scale   SubCutaneous at bedtime  levothyroxine Injectable 75 MICROGram(s) IV Push at bedtime  lisinopril 10 milliGRAM(s) Oral daily  meropenem  IVPB 2000 milliGRAM(s) IV Intermittent every 8 hours  polyethylene glycol 3350 17 Gram(s) Oral daily  prednisoLONE acetate 1% Suspension 1 Drop(s) Both EYES two times a day  predniSONE   Tablet 5 milliGRAM(s) Oral daily  senna 2 Tablet(s) Oral at bedtime    VITALS:  T(C): , Max: 37.9 (10-17-20 @ 11:06)  T(F): , Max: 100.2 (10-17-20 @ 11:06)  RR: 18 (10-17-20 @ 11:06)  SpO2: 96% (10-17-20 @ 11:06)    I and O's:    10-16 @ 07:01  -  10-17 @ 07:00  --------------------------------------------------------  IN: 420 mL / OUT: 1250 mL / NET: -830 mL    10-17 @ 07:01  -  10-17 @ 18:02  --------------------------------------------------------  IN: 220 mL / OUT: 400 mL / NET: -180 mL    PHYSICAL EXAM:  Constitutional: NAD  Neck:  No JVD  Respiratory: CTAB/L  Cardiovascular: S1 and S2  Gastrointestinal: BS+, soft, NT/ND  Extremities: No peripheral edema  Neurological: A/O x 3, no focal deficits  Psychiatric: Normal mood, normal affect  : No Mcbride  Skin: No rashes    LABS:                        8.7    6.96  )-----------( 171      ( 16 Oct 2020 06:13 )             29.1     10-17    132<L>  |  99  |  11  ----------------------------<  110<H>  3.9   |  25  |  <0.30<L>    Ca    9.0      17 Oct 2020 06:41    Urine Studies:    Sodium, Random Urine: 97 mmol/L (10-15 @ 22:09)  Osmolality, Random Urine: 332 mos/kg (10-15 @ 22:09)  Potassium, Random Urine: 22 mmol/L (10-15 @ 22:09)       NEPHROLOGY     Patient seen and examined. Pt reports feeling fine, though complained of headache earlier, no cp or sob, in no acute distress. No overnight events noted.     MEDICATIONS  (STANDING):  artificial  tears Solution 1 Drop(s) Both EYES two times a day  chlorhexidine 2% Cloths 1 Application(s) Topical daily  cycloSPORINE (RESTASIS) 0.05% Emulsion 1 Drop(s) Both EYES two times a day  dextrose 5%. 1000 milliLiter(s) (50 mL/Hr) IV Continuous <Continuous>  dextrose 50% Injectable 12.5 Gram(s) IV Push once  dextrose 50% Injectable 25 Gram(s) IV Push once  dextrose 50% Injectable 25 Gram(s) IV Push once  fondaparinux Injectable 2.5 milliGRAM(s) SubCutaneous daily  insulin lispro (HumaLOG) corrective regimen sliding scale   SubCutaneous three times a day before meals  insulin lispro (HumaLOG) corrective regimen sliding scale   SubCutaneous at bedtime  levothyroxine Injectable 75 MICROGram(s) IV Push at bedtime  lisinopril 10 milliGRAM(s) Oral daily  meropenem  IVPB 2000 milliGRAM(s) IV Intermittent every 8 hours  polyethylene glycol 3350 17 Gram(s) Oral daily  prednisoLONE acetate 1% Suspension 1 Drop(s) Both EYES two times a day  predniSONE   Tablet 5 milliGRAM(s) Oral daily  senna 2 Tablet(s) Oral at bedtime    VITALS:  T(C): , Max: 37.9 (10-17-20 @ 11:06)  T(F): , Max: 100.2 (10-17-20 @ 11:06)  RR: 18 (10-17-20 @ 11:06)  SpO2: 96% (10-17-20 @ 11:06)    I and O's:    10-16 @ 07:01  -  10-17 @ 07:00  --------------------------------------------------------  IN: 420 mL / OUT: 1250 mL / NET: -830 mL    10-17 @ 07:01  -  10-17 @ 18:02  --------------------------------------------------------  IN: 220 mL / OUT: 400 mL / NET: -180 mL    PHYSICAL EXAM:  Constitutional: NAD  Neck:  No JVD  Respiratory: CTAB/L  Cardiovascular: S1 and S2  Gastrointestinal: BS+, soft, NT/ND  Extremities: No peripheral edema  Neurological: A/O x 3, no focal deficits  Psychiatric: Normal mood, normal affect  : No Mcbride  Skin: No rashes    LABS:                        8.7    6.96  )-----------( 171      ( 16 Oct 2020 06:13 )             29.1     10-17    132<L>  |  99  |  11  ----------------------------<  110<H>  3.9   |  25  |  <0.30<L>    Ca    9.0      17 Oct 2020 06:41    Urine Studies:    Sodium, Random Urine: 97 mmol/L (10-15 @ 22:09)  Osmolality, Random Urine: 332 mos/kg (10-15 @ 22:09)  Potassium, Random Urine: 22 mmol/L (10-15 @ 22:09)    RADIOLOGIC AND OTHER STUDIES:    Patient name: MAXX LOPEZ  YOB: 1950   Age: 70 (F)   MR#: [id number]  Study Date: 10/17/2020  [id number] Sharon Menendez RDCS  Study quality: Technically fair  Referring Physician: Jhonny Daniel MD  Blood Pressure: 112/67 mmHg  Height: 147 cm  Weight: 49 kg  BSA: 1.4 m2  ------------------------------------------------------------------------  PROCEDURE: Limited transthoracic echocardiogram with 2-D.  M-Mode and spectral and color flow Doppler. Real-time and  reconstructed 3-dimensional imaging was performed.  Color  Doppler analysis was carried out.  INDICATION: Chronic ischemic heart disease, unspecified  (I25.9)  ------------------------------------------------------------------------  OBSERVATIONS:  Mitral Valve: Systolic anterior motion of the mitral valve  leaflet.  Left Atrium: Normal left atrium.  Left Ventricle: Hyperdynamic left ventricular systolic  function.   Small left ventricle.  ------------------------------------------------------------------------  CONCLUSIONS:  1. Systolic anterior motion of the mitral valve leaflet.  2. Small left ventricle.  3. Hyperdynamic left ventricular systolic function.   Peak left ventricular outflow tract gradient equals 67 mm  Hg, mean gradient is equal to 19 mm Hg, LVOT velocity time  integral equals 66 cm, consistent with moderately severe  LVOT obstruction.  ------------------------------------------------------------------------  Confirmed on  10/17/2020 - 16:30:18 by FOREST Lawrence

## 2020-10-17 NOTE — PROGRESS NOTE ADULT - SUBJECTIVE AND OBJECTIVE BOX
pt seen and examined, no complaints, ROS - .        artificial  tears Solution 1 Drop(s) Both EYES two times a day  chlorhexidine 2% Cloths 1 Application(s) Topical daily  cycloSPORINE (RESTASIS) 0.05% Emulsion 1 Drop(s) Both EYES two times a day  dextrose 40% Gel 15 Gram(s) Oral once PRN  dextrose 5%. 1000 milliLiter(s) IV Continuous <Continuous>  dextrose 50% Injectable 12.5 Gram(s) IV Push once  dextrose 50% Injectable 25 Gram(s) IV Push once  dextrose 50% Injectable 25 Gram(s) IV Push once  fondaparinux Injectable 2.5 milliGRAM(s) SubCutaneous daily  glucagon  Injectable 1 milliGRAM(s) IntraMuscular once PRN  hydrALAZINE Injectable 5 milliGRAM(s) IV Push every 6 hours PRN  insulin lispro (HumaLOG) corrective regimen sliding scale   SubCutaneous three times a day before meals  insulin lispro (HumaLOG) corrective regimen sliding scale   SubCutaneous at bedtime  levothyroxine Injectable 75 MICROGram(s) IV Push at bedtime  lisinopril 10 milliGRAM(s) Oral daily  meropenem  IVPB 2000 milliGRAM(s) IV Intermittent every 8 hours  polyethylene glycol 3350 17 Gram(s) Oral daily  prednisoLONE acetate 1% Suspension 1 Drop(s) Both EYES two times a day  predniSONE   Tablet 5 milliGRAM(s) Oral daily  senna 2 Tablet(s) Oral at bedtime  simethicone 80 milliGRAM(s) Chew three times a day PRN                            8.7    6.96  )-----------( 171      ( 16 Oct 2020 06:13 )             29.1       Hemoglobin: 8.7 g/dL (10-16 @ 06:13)  Hemoglobin: 8.4 g/dL (10-15 @ 04:24)  Hemoglobin: 7.4 g/dL (10-14 @ 14:32)  Hemoglobin: 8.1 g/dL (10-14 @ 06:54)  Hemoglobin: 8.0 g/dL (10-13 @ 06:47)      10-16    130<L>  |  97  |  8   ----------------------------<  124<H>  4.3   |  22  |  <0.30<L>    Ca    8.5      16 Oct 2020 06:13      Creatinine Trend: <0.30<--, <0.30<--, <0.30<--, <0.30<--, <0.30<--, <0.30<--    COAGS:     CARDIAC MARKERS ( 15 Oct 2020 04:24 )  x     / x     / 36 U/L / x     / 1.6 ng/mL  CARDIAC MARKERS ( 14 Oct 2020 14:32 )  x     / x     / 24 U/L / x     / 4.2 ng/mL        T(C): 36.5 (10-17-20 @ 05:16), Max: 36.9 (10-16-20 @ 20:51)  HR: 79 (10-17-20 @ 05:16) (79 - 99)  BP: 133/74 (10-17-20 @ 05:16) (100/66 - 133/74)  RR: 18 (10-17-20 @ 05:16) (18 - 18)  SpO2: 98% (10-17-20 @ 05:16) (97% - 98%)  Wt(kg): --    I&O's Summary    16 Oct 2020 07:01  -  17 Oct 2020 07:00  --------------------------------------------------------  IN: 420 mL / OUT: 1250 mL / NET: -830 mL    Gen: Appears well in NAD  HEENT:  (-)icterus (-)pallor  CV: N S1 S2 1/6 JUAN (+)2 Pulses B/l  Resp:  Clear to ausculatation B/L, normal effort  GI: (+) BS Soft, NT, ND  Lymph:  (-)Edema, (-)obvious lymphadenopathy  Skin: Warm to touch, Normal turgor  Psych: Appropriate mood and affect      TELEMETRY: SR/ST 	      ECG:  Sinus 86 BPM, LVH nonspecific T wave abnormalities     Echo:< from: Transthoracic Echocardiogram (10.09.20 @ 10:59) >  1. Calcified trileaflet aortic valve with decreased  opening. Peak transaortic valve gradient equals 16 mm Hg,  mean transaortic valve gradient equals 8 mm Hg, estimated  aortic valve area equals 1.6 sqcm (by continuity equation),  aortic valve velocity time integral equals 28 cm,  consistent with mild aortic stenosis.  2. Increased relative wall thickness with normal left  ventricular mass index, consistent with concentric left  ventricular remodeling.  3. Normal left ventricular systolic function. No segmental  wall motion abnormalities.  4. Normal right ventricular size and function.  *** No previous Echo exam.  Unable to rule out endocarditis, consider JAIME if clinicaly  indicated.  ------------------------------------------------------------------------  Confirmed on  10/9/2020 - 13:48:44 by FOREST Lawrence  ------------------------------------------------------------------------    < end of copied text >    ASSESSMENT/PLAN: 70y Female  history of RA, DM, HTN, fibromyalgia, hypothyroidism, brain aneurysm s/p repair BIBEMS for shortness of breath and fever PNA with an episode of hypotension and (+) Trop.    - BP stable , tolerating ACE  - ECHO noted from 10/9 , repeat pending   - Pulmonary f/u  - Continue Abx per ID  - Further recs pending above

## 2020-10-17 NOTE — PROGRESS NOTE ADULT - ASSESSMENT
ASSESSMENT/PLAN:  This is a 70/F w/ RA, DM, HTN, fibromyalgia, hypothyroidism, brain aneurysm s/p repair BIBEMS for AMS.  Found in respiratory distress likely 2/2 multifocal PNA, intubated for hypoxemic respiratory failure.  Extubated in MICU w/o complication.  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  SHe is a vegetarian and gets her protein intake from nonanimal meats.  This could also be from hypotension that will lead to pre-renal azotemia(and decrease in ability to excrete free water)  Urine indices are not c/w SIADH     1 Rheum-At present no need for stress steroids but if hypotension happens again then IV steroids  2 Renal- Increase Glucerna to tid and no salt restriction; The abx has a large salt load which will help;  Serum sodium continues to improve  3 ID- On IV abx   4 [facility]-Reduce the lisinopril to 10mg and will monitor the BP.  (stable today).  If the BP elevated again then will increase the Lisinopril back to 20mg in am     ERICA QuinnC  [facility]  [phone]    ASSESSMENT/PLAN:  This is a 70/F w/ RA, DM, HTN, fibromyalgia, hypothyroidism, brain aneurysm s/p repair BIBEMS for AMS.  Found in respiratory distress likely 2/2 multifocal PNA, intubated for hypoxemic respiratory failure.  Extubated in MICU w/o complication.  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  SHe is a vegetarian and gets her protein intake from nonanimal meats.  This could also be from hypotension that will lead to pre-renal azotemia(and decrease in ability to excrete free water)  Urine indices are not c/w SIADH     1 Rheum-At present no need for stress steroids but if hypotension happens again then IV steroids  2 Renal- Increase Glucerna to tid and no salt restriction; The abx has a large salt load which will help;  Serum sodium continues to improve  3 ID- On IV abx   4 [facility]S-Reduce the lisinopril to 10mg and will monitor the BP.  (stable today).  If the BP elevated again then will increase the Lisinopril back to 20mg in am. Planned for NST     Helga Sullivan NP-C  [facility]  [phone]

## 2020-10-17 NOTE — PROGRESS NOTE ADULT - SUBJECTIVE AND OBJECTIVE BOX
Neurology Progress Note    S: Patient seen and examined. No new events overnight. patient denied CP, SOB, HA. dizziness improved. still with proximal muscle weakness and pain. wants to go home but pending TTE and NST    Medication:  artificial  tears Solution 1 Drop(s) Both EYES two times a day  chlorhexidine 2% Cloths 1 Application(s) Topical daily  cycloSPORINE (RESTASIS) 0.05% Emulsion 1 Drop(s) Both EYES two times a day  dextrose 40% Gel 15 Gram(s) Oral once PRN  dextrose 5%. 1000 milliLiter(s) IV Continuous <Continuous>  dextrose 50% Injectable 12.5 Gram(s) IV Push once  dextrose 50% Injectable 25 Gram(s) IV Push once  dextrose 50% Injectable 25 Gram(s) IV Push once  fondaparinux Injectable 2.5 milliGRAM(s) SubCutaneous daily  glucagon  Injectable 1 milliGRAM(s) IntraMuscular once PRN  hydrALAZINE Injectable 5 milliGRAM(s) IV Push every 6 hours PRN  insulin lispro (HumaLOG) corrective regimen sliding scale   SubCutaneous three times a day before meals  insulin lispro (HumaLOG) corrective regimen sliding scale   SubCutaneous at bedtime  levothyroxine Injectable 75 MICROGram(s) IV Push at bedtime  lisinopril 10 milliGRAM(s) Oral daily  meropenem  IVPB 2000 milliGRAM(s) IV Intermittent every 8 hours  polyethylene glycol 3350 17 Gram(s) Oral daily  prednisoLONE acetate 1% Suspension 1 Drop(s) Both EYES two times a day  predniSONE   Tablet 5 milliGRAM(s) Oral daily  senna 2 Tablet(s) Oral at bedtime  simethicone 80 milliGRAM(s) Chew three times a day PRN      Vitals:  Vital Signs Last 24 Hrs  T(C): 36.8 (16 Oct 2020 05:01), Max: 37.1 (15 Oct 2020 20:54)  T(F): 98.3 (16 Oct 2020 05:01), Max: 98.7 (15 Oct 2020 20:54)  HR: 102 (16 Oct 2020 05:01) (70 - 102)  BP: 149/84 (16 Oct 2020 05:21) (100/64 - 149/84)  BP(mean): --  RR: 18 (16 Oct 2020 05:21) (18 - 18)  SpO2: 98% (16 Oct 2020 05:21) (96% - 98%)    General Exam:   General Appearance: Appropriately dressed and in no acute distress       Head: Normocephalic, atraumatic and no dysmorphic features  Ear, Nose, and Throat: Moist mucous membranes  CVS: S1S2+  Resp: mild SOB but no wheeze  GI: soft NT/ND  Extremeties: mild LE edema no  cyanosis  Skin: some bruises from IV     Neurological Exam:  Mental Status: Awake, alert and oriented x 3.  Able to follow simple and complex verbal commands. Able to name and repeat. fluent speech. No obvious aphasia or dysarthria noted.   Cranial Nerves: PERRL, EOMI, VFFC, sensation V1-V3 intact,  no obvious facial assymetry, equal elevation of palate, scm/trap 5/5, tongue is midline on protrusion. no obvious papilledema on fundoscopic exam. hearing is grossly intact.   Motor: GAMA but proximally limited motion 1/5 in UE and LE proximally, but distally x 4 she is 4/5 (baseline?)   Sensation: Intact to light touch and pinprick throughout. no right/left confusion.   Reflexes: 1+ throughout at biceps, brachioradialis, triceps, patellars and ankles bilaterally and equal. No clonus. R toe and L toe were both downgoing.  Coordination: unable   Gait: wheelchair bound    I personally reviewed the below data/images/labs:      artificial  tears Solution 1 Drop(s) Both EYES two times a day  chlorhexidine 2% Cloths 1 Application(s) Topical daily  cycloSPORINE (RESTASIS) 0.05% Emulsion 1 Drop(s) Both EYES two times a day  dextrose 40% Gel 15 Gram(s) Oral once PRN  dextrose 5%. 1000 milliLiter(s) IV Continuous <Continuous>  dextrose 50% Injectable 12.5 Gram(s) IV Push once  dextrose 50% Injectable 25 Gram(s) IV Push once  dextrose 50% Injectable 25 Gram(s) IV Push once  fondaparinux Injectable 2.5 milliGRAM(s) SubCutaneous daily  glucagon  Injectable 1 milliGRAM(s) IntraMuscular once PRN  hydrALAZINE Injectable 5 milliGRAM(s) IV Push every 6 hours PRN  insulin lispro (HumaLOG) corrective regimen sliding scale   SubCutaneous three times a day before meals  insulin lispro (HumaLOG) corrective regimen sliding scale   SubCutaneous at bedtime  levothyroxine Injectable 75 MICROGram(s) IV Push at bedtime  lisinopril 10 milliGRAM(s) Oral daily  meropenem  IVPB 2000 milliGRAM(s) IV Intermittent every 8 hours  polyethylene glycol 3350 17 Gram(s) Oral daily  prednisoLONE acetate 1% Suspension 1 Drop(s) Both EYES two times a day  predniSONE   Tablet 5 milliGRAM(s) Oral daily  senna 2 Tablet(s) Oral at bedtime  simethicone 80 milliGRAM(s) Chew three times a day PRN    CBC Full  -  ( 16 Oct 2020 06:13 )  WBC Count : 6.96 K/uL  RBC Count : 3.55 M/uL  Hemoglobin : 8.7 g/dL  Hematocrit : 29.1 %  Platelet Count - Automated : 171 K/uL  Mean Cell Volume : 82.0 fl  Mean Cell Hemoglobin : 24.5 pg  Mean Cell Hemoglobin Concentration : 29.9 gm/dL  Auto Neutrophil # : x  Auto Lymphocyte # : x  Auto Monocyte # : x  Auto Eosinophil # : x  Auto Basophil # : x  Auto Neutrophil % : x  Auto Lymphocyte % : x  Auto Monocyte % : x  Auto Eosinophil % : x  Auto Basophil % : x    10-16    130<L>  |  97  |  8   ----------------------------<  124<H>  4.3   |  22  |  <0.30<L>    Ca    8.5      16 Oct 2020 06:13          Urinalysis Basic - ( 14 Oct 2020 15:24 )    Color: Light Yellow / Appearance: Clear / S.013 / pH: x  Gluc: x / Ketone: Trace  / Bili: Negative / Urobili: Negative   Blood: x / Protein: Trace / Nitrite: Negative   Leuk Esterase: Negative / RBC: 3 /hpf / WBC 1 /HPF   Sq Epi: x / Non Sq Epi: 2 /hpf / Bacteria: Negative            CTA chest: < from: CT Angio Chest w/ IV Cont (10.14.20 @ 21:30) >  FINDINGS:    LUNGS AND AIRWAYS: Low lung volumes. Elevated diaphragm. Interval improvement in bilateral posterior and dependent consolidations previously seen on chest CT from 10/7/2020.  PLEURA: Bilateral small left and trace right pleural effusions.  MEDIASTINUM AND ROSEMARY: No lymphadenopathy.  VESSELS: The main pulmonary artery measures 2.1 cm. There is no main, central, lobar, or segmental pulmonary embolus. Right upper extremity PICC with tip terminating in the right atrium. Calcified and noncalcified ulcerative plaque involving the aorta.  HEART: Heart size is normal. No pericardial effusion. Coronary arterial calcifications. No signs of right heart strain.  CHEST WALL AND LOWER NECK: Within normal limits.  VISUALIZED UPPER ABDOMEN: Embolization coil mass in the gastrohepatic space.  BONES: Degenerative changes.    IMPRESSION:    No main, lobar, or segmental pulmonary embolism. Limited evaluation the subsegmental pulmonary arteries due to respiratory motion.    Improvement in bilateral atelectasis seen on chest CT from 10/7/2020. Small left and trace right pleural effusions.      CTH 10/14/20  IMPRESSION: No acute intracranial hemorrhage, mass effect, or shift of the midline structures.    Mild interval advancement of chronic white matter microvascular type changes.    TTE: 1. Calcified trileaflet aortic valve with decreased  opening. Peak transaortic valve gradient equals 16 mm Hg,  mean transaortic valve gradient equals 8 mm Hg, estimated  aortic valve area equals 1.6 sqcm (by continuity equation),  aortic valve velocity time integral equals 28 cm,  consistent with mild aortic stenosis.  2. Increased relative wall thickness with normal left  ventricular mass index, consistent with concentric left  ventricular remodeling.  3. Normal left ventricular systolic function. No segmental  wall motion abnormalities.  4. Normal right ventricular size and function.  *** No previous Echo exam.  Unable to rule out endocarditis, consider JAIME if clinicaly  indicated.  Hgb A1c 7.3

## 2020-10-17 NOTE — PROGRESS NOTE ADULT - ASSESSMENT
71 yo F with history of RA, DM, HTN, fibromyalgia, hypothyroidism, brain aneurysm s/p repair (2004?) BIBEMS for shortness of breath and fever. Per EMS, patient has had 3 days of fever, Tmax 102.5 , no known COVID exposures, took tylenol at unknown time before being brought in. patient didn't take her med for 2 days prior to admission. Patient c/o feeling fatigued, short of breath. Denies chest pain. Patient uses a wheelchair at baseline 2/2 arthritis. Intubated and in ICU extubated on 10/9 then transferred to floor.  Neurology called for dizzy sensation and h/o aneurysm.  Na improving 129-->130 today, Hgb also improving. 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 CTH obtained unremarkable. TTE with LVH, A1c7.3%  o/e proximal weakness> distal. Dizziness may be multifactorial 2/2 anemia and hyponatremia. dizziness now improved.   - dizziness improved no need for MRI brain.   - hyponatremia Na 129 -->130-->132 today, improving. slow correction  - monitor Hgb improved   - f/u TFTs  - need to better eval h/o aneurysm? CTA H/N   - check orthostatics if she is able to sit up. wheelchair bound  - pending NST per cardio  - telemetry  - PT/OT, OOBC  - check FS, glucose control <180  - GI/DVT ppx  - Counseling on diet, exercise, and medication adherence was done  - Counseling on smoking cessation and alcohol consumption offered when appropriate.  - Pain assessed and judicious use of narcotics when appropriate was discussed.    - Stroke education given when appropriate.  - Importance of fall prevention discussed.   - Differential diagnosis and plan of care discussed with patient and/or family and primary team  - Thank you for allowing me to participate in the care of this patient. Call with questions.   Dez Madera MD  Vascular Neurology  Office: [phone]

## 2020-10-18 LAB
ANION GAP SERPL CALC-SCNC: 11 MMOL/L — SIGNIFICANT CHANGE UP (ref 5–17)
BUN SERPL-MCNC: 9 MG/DL — SIGNIFICANT CHANGE UP (ref 7–23)
CALCIUM SERPL-MCNC: 8.4 MG/DL — SIGNIFICANT CHANGE UP (ref 8.4–10.5)
CHLORIDE SERPL-SCNC: 97 MMOL/L — SIGNIFICANT CHANGE UP (ref 96–108)
CO2 SERPL-SCNC: 22 MMOL/L — SIGNIFICANT CHANGE UP (ref 22–31)
CREAT SERPL-MCNC: <0.3 MG/DL — LOW (ref 0.5–1.3)
GLUCOSE BLDC GLUCOMTR-MCNC: 104 MG/DL — HIGH (ref 70–99)
GLUCOSE BLDC GLUCOMTR-MCNC: 106 MG/DL — HIGH (ref 70–99)
GLUCOSE BLDC GLUCOMTR-MCNC: 213 MG/DL — HIGH (ref 70–99)
GLUCOSE BLDC GLUCOMTR-MCNC: 77 MG/DL — SIGNIFICANT CHANGE UP (ref 70–99)
GLUCOSE SERPL-MCNC: 68 MG/DL — LOW (ref 70–99)
POTASSIUM SERPL-MCNC: 3.5 MMOL/L — SIGNIFICANT CHANGE UP (ref 3.5–5.3)
POTASSIUM SERPL-SCNC: 3.5 MMOL/L — SIGNIFICANT CHANGE UP (ref 3.5–5.3)
SODIUM SERPL-SCNC: 130 MMOL/L — LOW (ref 135–145)

## 2020-10-18 RX ADMIN — CHLORHEXIDINE GLUCONATE 1 APPLICATION(S): 213 SOLUTION TOPICAL at 12:46

## 2020-10-18 RX ADMIN — Medication 12.5 MILLIGRAM(S): at 05:01

## 2020-10-18 RX ADMIN — LISINOPRIL 2.5 MILLIGRAM(S): 2.5 TABLET ORAL at 05:01

## 2020-10-18 RX ADMIN — Medication 1 DROP(S): at 19:01

## 2020-10-18 RX ADMIN — CYCLOSPORINE 1 DROP(S): 0.5 EMULSION OPHTHALMIC at 19:27

## 2020-10-18 RX ADMIN — CYCLOSPORINE 1 DROP(S): 0.5 EMULSION OPHTHALMIC at 05:01

## 2020-10-18 RX ADMIN — Medication 1 DROP(S): at 05:01

## 2020-10-18 RX ADMIN — FONDAPARINUX SODIUM 2.5 MILLIGRAM(S): 2.5 INJECTION, SOLUTION SUBCUTANEOUS at 13:33

## 2020-10-18 RX ADMIN — Medication 75 MICROGRAM(S): at 21:35

## 2020-10-18 RX ADMIN — MEROPENEM 200 MILLIGRAM(S): 1 INJECTION INTRAVENOUS at 05:01

## 2020-10-18 RX ADMIN — MEROPENEM 200 MILLIGRAM(S): 1 INJECTION INTRAVENOUS at 13:33

## 2020-10-18 RX ADMIN — Medication 1 DROP(S): at 19:00

## 2020-10-18 RX ADMIN — Medication 2: at 12:55

## 2020-10-18 RX ADMIN — MEROPENEM 200 MILLIGRAM(S): 1 INJECTION INTRAVENOUS at 21:36

## 2020-10-18 RX ADMIN — Medication 5 MILLIGRAM(S): at 05:01

## 2020-10-18 RX ADMIN — POLYETHYLENE GLYCOL 3350 17 GRAM(S): 17 POWDER, FOR SOLUTION ORAL at 12:45

## 2020-10-18 NOTE — PROGRESS NOTE ADULT - SUBJECTIVE AND OBJECTIVE BOX
Patient is a 70y old  Female who presents with a chief complaint of SOB (09 Oct 2020 12:09)      SUBJECTIVE / OVERNIGHT EVENTS:  No new events noted today    Review of Systems:   MEDICATIONS  (STANDING):  artificial  tears Solution 1 Drop(s) Both EYES two times a day  cycloSPORINE (RESTASIS) 0.05% Emulsion 1 Drop(s) Both EYES two times a day  dextrose 5%. 1000 milliLiter(s) (50 mL/Hr) IV Continuous <Continuous>  dextrose 50% Injectable 12.5 Gram(s) IV Push once  dextrose 50% Injectable 25 Gram(s) IV Push once  dextrose 50% Injectable 25 Gram(s) IV Push once  insulin lispro (HumaLOG) corrective regimen sliding scale   SubCutaneous every 6 hours  levothyroxine Injectable 50 MICROGram(s) IV Push at bedtime  lisinopril 20 milliGRAM(s) Oral daily  meropenem  IVPB 2000 milliGRAM(s) IV Intermittent every 8 hours  methylPREDNISolone sodium succinate Injectable 8 milliGRAM(s) IV Push daily  potassium phosphate / sodium phosphate Powder (PHOS-NaK) 1 Packet(s) Oral two times a day before meals  prednisoLONE acetate 1% Suspension 1 Drop(s) Both EYES two times a day    MEDICATIONS  (PRN):  dextrose 40% Gel 15 Gram(s) Oral once PRN Blood Glucose LESS THAN 70 milliGRAM(s)/deciliter  glucagon  Injectable 1 milliGRAM(s) IntraMuscular once PRN Glucose LESS THAN 70 milligrams/deciliter  hydrALAZINE Injectable 5 milliGRAM(s) IV Push every 6 hours PRN SBP >170      PHYSICAL EXAM:  Vital Signs Last 24 Hrs  T(C): 36.6 (09 Oct 2020 20:00), Max: 37 (09 Oct 2020 04:00)  T(F): 97.9 (09 Oct 2020 20:00), Max: 98.6 (09 Oct 2020 04:00)  HR: 88 (09 Oct 2020 22:00) (69 - 124)  BP: 156/71 (09 Oct 2020 22:00) (116/58 - 210/99)  BP(mean): 102 (09 Oct 2020 22:00) (79 - 142)  RR: 16 (09 Oct 2020 22:00) (15 - 46)  SpO2: 98% (09 Oct 2020 22:00) (93% - 100%)  I&O's Summary    08 Oct 2020 07:01  -  09 Oct 2020 07:00  --------------------------------------------------------  IN: 491.4 mL / OUT: 640 mL / NET: -148.6 mL    09 Oct 2020 07:01  -  09 Oct 2020 22:55  --------------------------------------------------------  IN: 2670 mL / OUT: 300 mL / NET: 2370 mL      GENERAL:On a vent  HEAD:  Atraumatic, Normocephalic  EYES: EOMI, PERRLA, conjunctiva and sclera clear  NECK: Supple, No JVD  CHEST/LUNG: Clear to auscultation bilaterally; No wheeze  HEART: Regular rate and rhythm; No murmurs, rubs, or gallops  ABDOMEN: Soft, Nontender, Nondistended; Bowel sounds present  EXTREMITIES:  2+ Peripheral Pulses, No clubbing, cyanosis, or edema  PSYCH: AAOx3  NEUROLOGY: non-focal. Gen weakness, bed bound. Power in all extremities is   SKIN: No rashes or lesions/    LABS:  CAPILLARY BLOOD GLUCOSE      POCT Blood Glucose.: 239 mg/dL (09 Oct 2020 18:44)  POCT Blood Glucose.: 104 mg/dL (09 Oct 2020 05:11)  POCT Blood Glucose.: 151 mg/dL (08 Oct 2020 23:13)                          8.1    5.73  )-----------( 140      ( 09 Oct 2020 12:44 )             25.6     10-09    131<L>  |  101  |  10  ----------------------------<  96  3.6   |  16<L>  |  <0.30<L>    Ca    8.8      09 Oct 2020 12:44  Phos  2.1     10-09  Mg     1.6     10-09    TPro  6.0  /  Alb  2.9<L>  /  TBili  0.4  /  DBili  x   /  AST  38  /  ALT  57<H>  /  AlkPhos  41  10-09    PT/INR - ( 09 Oct 2020 00:21 )   PT: 13.6 sec;   INR: 1.14 ratio         PTT - ( 09 Oct 2020 00:21 )  PTT:30.2 sec          RADIOLOGY & ADDITIONAL TESTS:    Imaging Personally Reviewed:    Consultant(s) Notes Reviewed:      Care Discussed with Consultants/Other Providers:

## 2020-10-18 NOTE — PROGRESS NOTE ADULT - SUBJECTIVE AND OBJECTIVE BOX
CC: f/u for Listeria bacteremia    Patient reports that she is feeling good & wants to go home    REVIEW OF SYSTEMS:  All other review of systems negative (Comprehensive ROS)    Antimicrobials Day #  :  meropenem  IVPB 2000 milliGRAM(s) IV Intermittent every 8 hours    Other Medications Reviewed    T(F): 98.2 (10-18-20 @ 04:23), Max: 99 (10-17-20 @ 15:07)  HR: 68 (10-18-20 @ 04:23)  BP: 148/84 (10-18-20 @ 04:23)  RR: 18 (10-18-20 @ 04:23)  SpO2: 97% (10-18-20 @ 04:23)  Wt(kg): --    PHYSICAL EXAM:  General: alert, no acute distress  Eyes:  anicteric, no conjunctival injection, no discharge  Neck: supple, without adenopathy  Lungs: clear to auscultation  Heart: regular rate and rhythm; no murmurs  Abdomen: soft, nondistended, nontender.  Skin: no lesions  Extremities: no clubbing or cyanosis. + edema                    RUE PICC   Neurologic: alert & oriented. Weak extremities      LAB RESULTS:    10-18    130<L>  |  97  |  9   ----------------------------<  68<L>  3.5   |  22  |  <0.30<L>    Ca    8.4      18 Oct 2020 06:53      Urinalysis Basic - ( 17 Oct 2020 22:39 )    Color: Colorless / Appearance: Clear / S.007 / pH: x  Gluc: x / Ketone: Small  / Bili: Negative / Urobili: Negative   Blood: x / Protein: 30 mg/dL / Nitrite: Negative   Leuk Esterase: Negative / RBC: 1 /hpf / WBC 0 /HPF   Sq Epi: x / Non Sq Epi: 0 /hpf / Bacteria: Negative      MICROBIOLOGY:  RECENT CULTURES:  10-14 @ 18:50 .Urine Catheterized     No growth      10-14 @ 17:44 .Blood Blood     No growth to date.        RADIOLOGY REVIEWED:

## 2020-10-18 NOTE — PROGRESS NOTE ADULT - ASSESSMENT
72 yo female with RA, DM, HTN, fibromyalgia, immune suppressed at home on Enbrel and 5 mg of prednisone bid  and remote repair of cerebral aneurysm now admitted with fever, weakness and hypoxia.  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  Required vent & pressors support in ICU for shock - shock resolved.   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  She recalls a rash to PCN, therefor is on meropenem which she appears to be tolerating  RVP is negative, MRSA screen positive.  TTE with mild AS, no vegetations.  Repeat 10/9 blood cultures are NG to date  She had developed pancytopenia, now improved.  S/p RUE PICC placement   Transient fever of 100.4 F on 10/14 - was pancultured  CTA chest neg for PE, CTH no acute event  Urine cx no growth   Recurrent fever of 100.2F on 10/17/20 - patient however, is asymptomatic & afebrile today  Repeat blood cx so far no growth     Suggest:  Continue meropenem for listeria bacteremia and concerns of pneumonia  Anticipate a 3 wks course of antibiotics total & today is D # 11/21  F/u cultures

## 2020-10-18 NOTE — PROGRESS NOTE ADULT - SUBJECTIVE AND OBJECTIVE BOX
pt seen and examined, no complaints, ROS - .        artificial  tears Solution 1 Drop(s) Both EYES two times a day  chlorhexidine 2% Cloths 1 Application(s) Topical daily  cycloSPORINE (RESTASIS) 0.05% Emulsion 1 Drop(s) Both EYES two times a day  dextrose 40% Gel 15 Gram(s) Oral once PRN  dextrose 5%. 1000 milliLiter(s) IV Continuous <Continuous>  dextrose 50% Injectable 12.5 Gram(s) IV Push once  dextrose 50% Injectable 25 Gram(s) IV Push once  dextrose 50% Injectable 25 Gram(s) IV Push once  fondaparinux Injectable 2.5 milliGRAM(s) SubCutaneous daily  glucagon  Injectable 1 milliGRAM(s) IntraMuscular once PRN  hydrALAZINE Injectable 5 milliGRAM(s) IV Push every 6 hours PRN  insulin lispro (HumaLOG) corrective regimen sliding scale   SubCutaneous three times a day before meals  insulin lispro (HumaLOG) corrective regimen sliding scale   SubCutaneous at bedtime  levothyroxine Injectable 75 MICROGram(s) IV Push at bedtime  lisinopril 2.5 milliGRAM(s) Oral daily  meropenem  IVPB 2000 milliGRAM(s) IV Intermittent every 8 hours  metoprolol succinate ER 12.5 milliGRAM(s) Oral daily  polyethylene glycol 3350 17 Gram(s) Oral daily  prednisoLONE acetate 1% Suspension 1 Drop(s) Both EYES two times a day  predniSONE   Tablet 5 milliGRAM(s) Oral daily  senna 2 Tablet(s) Oral at bedtime  simethicone 80 milliGRAM(s) Chew three times a day PRN  traMADol 50 milliGRAM(s) Oral two times a day PRN          Hemoglobin: 8.7 g/dL (10-16 @ 06:13)  Hemoglobin: 8.4 g/dL (10-15 @ 04:24)  Hemoglobin: 7.4 g/dL (10-14 @ 14:32)  Hemoglobin: 8.1 g/dL (10-14 @ 06:54)      10-18    130<L>  |  97  |  9   ----------------------------<  68<L>  3.5   |  22  |  <0.30<L>    Ca    8.4      18 Oct 2020 06:53      Creatinine Trend: <0.30<--, <0.30<--, <0.30<--, <0.30<--, <0.30<--, <0.30<--    COAGS:           T(C): 36.8 (10-18-20 @ 04:23), Max: 37.9 (10-17-20 @ 11:06)  HR: 68 (10-18-20 @ 04:23) (68 - 102)  BP: 148/84 (10-18-20 @ 04:23) (94/59 - 148/84)  RR: 18 (10-18-20 @ 04:23) (18 - 18)  SpO2: 97% (10-18-20 @ 04:23) (96% - 99%)  Wt(kg): --    I&O's Summary    17 Oct 2020 07:01  -  18 Oct 2020 07:00  --------------------------------------------------------  IN: 740 mL / OUT: 950 mL / NET: -210 mL        Gen: Appears well in NAD  HEENT:  (-)icterus (-)pallor  CV: N S1 S2 1/6 JUAN (+)2 Pulses B/l  Resp:  Clear to ausculatation B/L, normal effort  GI: (+) BS Soft, NT, ND  Lymph:  (-)Edema, (-)obvious lymphadenopathy  Skin: Warm to touch, Normal turgor  Psych: Appropriate mood and affect      TELEMETRY: SR/ST 	      ECG:  Sinus 86 BPM, LVH nonspecific T wave abnormalities     Echo:< from: Transthoracic Echocardiogram (10.09.20 @ 10:59) >  1. Calcified trileaflet aortic valve with decreased  opening. Peak transaortic valve gradient equals 16 mm Hg,  mean transaortic valve gradient equals 8 mm Hg, estimated  aortic valve area equals 1.6 sqcm (by continuity equation),  aortic valve velocity time integral equals 28 cm,  consistent with mild aortic stenosis.  2. Increased relative wall thickness with normal left  ventricular mass index, consistent with concentric left  ventricular remodeling.  3. Normal left ventricular systolic function. No segmental  wall motion abnormalities.  4. Normal right ventricular size and function.  *** No previous Echo exam.  Unable to rule out endocarditis, consider JAIME if clinicaly  indicated.  ------------------------------------------------------------------------  Confirmed on  10/9/2020 - 13:48:44 by FOREST Lawrence  ------------------------------------------------------------------------    < end of copied text >    ECHO: ho< from: Limited Transthoracic Echo (w/3D) (10.17.20 @ 14:37) >  CONCLUSIONS:  1. Systolic anterior motion of the mitral valve leaflet.  2. Small left ventricle.  3. Hyperdynamic left ventricular systolic function.   Peak left ventricular outflow tract gradient equals 67 mm  Hg, mean gradient is equal to 19 mm Hg, LVOT velocity time  integral equals 66 cm, consistent with moderately severe  LVOT obstruction.  ------------------------------------------------------------------------  Confirmed on  10/17/2020 - 16:30:18 by FOREST Lawrence  ------------------------------------------------------------------------    < end of copied text >      ASSESSMENT/PLAN: 70y Female  history of RA, DM, HTN, fibromyalgia, hypothyroidism, brain aneurysm s/p repair BIBEMS for shortness of breath and fever PNA with an episode of hypotension and (+) Trop.    - BP stable , tolerating ACE  - ECHO noted from 10/9 ,  repeat echo noted   - Pulmonary f/u  - Continue Abx per ID

## 2020-10-19 ENCOUNTER — TRANSCRIPTION ENCOUNTER (OUTPATIENT)
Age: 70
End: 2020-10-19

## 2020-10-19 LAB
ANION GAP SERPL CALC-SCNC: 8 MMOL/L — SIGNIFICANT CHANGE UP (ref 5–17)
BUN SERPL-MCNC: 8 MG/DL — SIGNIFICANT CHANGE UP (ref 7–23)
CALCIUM SERPL-MCNC: 8.1 MG/DL — LOW (ref 8.4–10.5)
CHLORIDE SERPL-SCNC: 97 MMOL/L — SIGNIFICANT CHANGE UP (ref 96–108)
CO2 SERPL-SCNC: 22 MMOL/L — SIGNIFICANT CHANGE UP (ref 22–31)
CREAT SERPL-MCNC: <0.3 MG/DL — LOW (ref 0.5–1.3)
CULTURE RESULTS: SIGNIFICANT CHANGE UP
CULTURE RESULTS: SIGNIFICANT CHANGE UP
GLUCOSE BLDC GLUCOMTR-MCNC: 105 MG/DL — HIGH (ref 70–99)
GLUCOSE BLDC GLUCOMTR-MCNC: 125 MG/DL — HIGH (ref 70–99)
GLUCOSE BLDC GLUCOMTR-MCNC: 150 MG/DL — HIGH (ref 70–99)
GLUCOSE BLDC GLUCOMTR-MCNC: 158 MG/DL — HIGH (ref 70–99)
GLUCOSE SERPL-MCNC: 77 MG/DL — SIGNIFICANT CHANGE UP (ref 70–99)
HCT VFR BLD CALC: 27.9 % — LOW (ref 34.5–45)
HGB BLD-MCNC: 8.3 G/DL — LOW (ref 11.5–15.5)
MCHC RBC-ENTMCNC: 24.3 PG — LOW (ref 27–34)
MCHC RBC-ENTMCNC: 29.7 GM/DL — LOW (ref 32–36)
MCV RBC AUTO: 81.8 FL — SIGNIFICANT CHANGE UP (ref 80–100)
NRBC # BLD: 0 /100 WBCS — SIGNIFICANT CHANGE UP (ref 0–0)
PLATELET # BLD AUTO: 125 K/UL — LOW (ref 150–400)
POTASSIUM SERPL-MCNC: 3.6 MMOL/L — SIGNIFICANT CHANGE UP (ref 3.5–5.3)
POTASSIUM SERPL-SCNC: 3.6 MMOL/L — SIGNIFICANT CHANGE UP (ref 3.5–5.3)
RBC # BLD: 3.41 M/UL — LOW (ref 3.8–5.2)
RBC # FLD: 16.4 % — HIGH (ref 10.3–14.5)
SODIUM SERPL-SCNC: 127 MMOL/L — LOW (ref 135–145)
SPECIMEN SOURCE: SIGNIFICANT CHANGE UP
SPECIMEN SOURCE: SIGNIFICANT CHANGE UP
WBC # BLD: 5.29 K/UL — SIGNIFICANT CHANGE UP (ref 3.8–10.5)
WBC # FLD AUTO: 5.29 K/UL — SIGNIFICANT CHANGE UP (ref 3.8–10.5)

## 2020-10-19 PROCEDURE — 71045 X-RAY EXAM CHEST 1 VIEW: CPT | Mod: 26

## 2020-10-19 RX ORDER — METOPROLOL TARTRATE 50 MG
1 TABLET ORAL
Qty: 30 | Refills: 0
Start: 2020-10-19 | End: 2020-11-17

## 2020-10-19 RX ORDER — METOPROLOL TARTRATE 50 MG
25 TABLET ORAL DAILY
Refills: 0 | Status: DISCONTINUED | OUTPATIENT
Start: 2020-10-20 | End: 2020-10-23

## 2020-10-19 RX ORDER — SIMETHICONE 80 MG/1
1 TABLET, CHEWABLE ORAL
Qty: 0 | Refills: 0 | DISCHARGE
Start: 2020-10-19

## 2020-10-19 RX ORDER — ACETAMINOPHEN 500 MG
650 TABLET ORAL ONCE
Refills: 0 | Status: COMPLETED | OUTPATIENT
Start: 2020-10-19 | End: 2020-10-19

## 2020-10-19 RX ORDER — SENNA PLUS 8.6 MG/1
2 TABLET ORAL
Qty: 0 | Refills: 0 | DISCHARGE
Start: 2020-10-19

## 2020-10-19 RX ORDER — METOPROLOL TARTRATE 50 MG
0.5 TABLET ORAL
Qty: 15 | Refills: 0
Start: 2020-10-19 | End: 2020-11-17

## 2020-10-19 RX ORDER — MEROPENEM 1 G/30ML
2000 INJECTION INTRAVENOUS
Qty: 0 | Refills: 0 | DISCHARGE
Start: 2020-10-19

## 2020-10-19 RX ORDER — SODIUM CHLORIDE 9 MG/ML
1 INJECTION INTRAMUSCULAR; INTRAVENOUS; SUBCUTANEOUS ONCE
Refills: 0 | Status: COMPLETED | OUTPATIENT
Start: 2020-10-19 | End: 2020-10-19

## 2020-10-19 RX ORDER — RAMIPRIL 5 MG
0 CAPSULE ORAL
Qty: 0 | Refills: 0 | DISCHARGE

## 2020-10-19 RX ORDER — HYDRALAZINE HCL 50 MG
1 TABLET ORAL
Qty: 90 | Refills: 0
Start: 2020-10-19 | End: 2020-11-17

## 2020-10-19 RX ORDER — ETANERCEPT 25 MG
0 VIAL (EA) SUBCUTANEOUS
Qty: 0 | Refills: 0 | DISCHARGE

## 2020-10-19 RX ORDER — POLYETHYLENE GLYCOL 3350 17 G/17G
17 POWDER, FOR SOLUTION ORAL
Qty: 0 | Refills: 0 | DISCHARGE
Start: 2020-10-19

## 2020-10-19 RX ORDER — MEROPENEM 1 G/30ML
2000 INJECTION INTRAVENOUS
Qty: 60000 | Refills: 0
Start: 2020-10-19 | End: 2020-10-28

## 2020-10-19 RX ADMIN — Medication 1 DROP(S): at 05:05

## 2020-10-19 RX ADMIN — CYCLOSPORINE 1 DROP(S): 0.5 EMULSION OPHTHALMIC at 19:00

## 2020-10-19 RX ADMIN — SODIUM CHLORIDE 1 GRAM(S): 9 INJECTION INTRAMUSCULAR; INTRAVENOUS; SUBCUTANEOUS at 19:00

## 2020-10-19 RX ADMIN — CYCLOSPORINE 1 DROP(S): 0.5 EMULSION OPHTHALMIC at 05:05

## 2020-10-19 RX ADMIN — LISINOPRIL 2.5 MILLIGRAM(S): 2.5 TABLET ORAL at 05:04

## 2020-10-19 RX ADMIN — Medication 1: at 14:21

## 2020-10-19 RX ADMIN — SENNA PLUS 2 TABLET(S): 8.6 TABLET ORAL at 21:29

## 2020-10-19 RX ADMIN — Medication 650 MILLIGRAM(S): at 09:41

## 2020-10-19 RX ADMIN — MEROPENEM 200 MILLIGRAM(S): 1 INJECTION INTRAVENOUS at 14:21

## 2020-10-19 RX ADMIN — Medication 650 MILLIGRAM(S): at 09:11

## 2020-10-19 RX ADMIN — Medication 75 MICROGRAM(S): at 21:28

## 2020-10-19 RX ADMIN — Medication 5 MILLIGRAM(S): at 05:04

## 2020-10-19 RX ADMIN — Medication 1 DROP(S): at 17:47

## 2020-10-19 RX ADMIN — Medication 12.5 MILLIGRAM(S): at 05:04

## 2020-10-19 RX ADMIN — MEROPENEM 200 MILLIGRAM(S): 1 INJECTION INTRAVENOUS at 22:08

## 2020-10-19 RX ADMIN — CHLORHEXIDINE GLUCONATE 1 APPLICATION(S): 213 SOLUTION TOPICAL at 12:35

## 2020-10-19 RX ADMIN — POLYETHYLENE GLYCOL 3350 17 GRAM(S): 17 POWDER, FOR SOLUTION ORAL at 12:35

## 2020-10-19 RX ADMIN — FONDAPARINUX SODIUM 2.5 MILLIGRAM(S): 2.5 INJECTION, SOLUTION SUBCUTANEOUS at 12:35

## 2020-10-19 RX ADMIN — MEROPENEM 200 MILLIGRAM(S): 1 INJECTION INTRAVENOUS at 05:02

## 2020-10-19 NOTE — PROGRESS NOTE ADULT - SUBJECTIVE AND OBJECTIVE BOX
Follow-up Pulm Progress Note    No new respiratory events overnight  Sats 97% RA    Medications:  MEDICATIONS  (STANDING):  artificial  tears Solution 1 Drop(s) Both EYES two times a day  chlorhexidine 2% Cloths 1 Application(s) Topical daily  cycloSPORINE (RESTASIS) 0.05% Emulsion 1 Drop(s) Both EYES two times a day  dextrose 5%. 1000 milliLiter(s) (50 mL/Hr) IV Continuous <Continuous>  dextrose 50% Injectable 12.5 Gram(s) IV Push once  dextrose 50% Injectable 25 Gram(s) IV Push once  dextrose 50% Injectable 25 Gram(s) IV Push once  fondaparinux Injectable 2.5 milliGRAM(s) SubCutaneous daily  insulin lispro (HumaLOG) corrective regimen sliding scale   SubCutaneous at bedtime  insulin lispro (HumaLOG) corrective regimen sliding scale   SubCutaneous three times a day before meals  levothyroxine Injectable 75 MICROGram(s) IV Push at bedtime  lisinopril 2.5 milliGRAM(s) Oral daily  meropenem  IVPB 2000 milliGRAM(s) IV Intermittent every 8 hours  metoprolol succinate ER 12.5 milliGRAM(s) Oral daily  polyethylene glycol 3350 17 Gram(s) Oral daily  prednisoLONE acetate 1% Suspension 1 Drop(s) Both EYES two times a day  predniSONE   Tablet 5 milliGRAM(s) Oral daily  senna 2 Tablet(s) Oral at bedtime    MEDICATIONS  (PRN):  dextrose 40% Gel 15 Gram(s) Oral once PRN Blood Glucose LESS THAN 70 milliGRAM(s)/deciliter  glucagon  Injectable 1 milliGRAM(s) IntraMuscular once PRN Glucose LESS THAN 70 milligrams/deciliter  hydrALAZINE Injectable 5 milliGRAM(s) IV Push every 6 hours PRN SBP >170  simethicone 80 milliGRAM(s) Chew three times a day PRN Gas  traMADol 50 milliGRAM(s) Oral two times a day PRN Moderate Pain (4 - 6)/Moderate Pain (4 - 6)          Vital Signs Last 24 Hrs  T(C): 37.4 (19 Oct 2020 07:54), Max: 37.4 (19 Oct 2020 07:54)  T(F): 99.4 (19 Oct 2020 07:54), Max: 99.4 (19 Oct 2020 07:54)  HR: 108 (19 Oct 2020 07:54) (89 - 108)  BP: 122/59 (19 Oct 2020 07:54) (117/72 - 175/76)  BP(mean): --  RR: 18 (19 Oct 2020 07:54) (18 - 18)  SpO2: 97% (19 Oct 2020 07:54) (95% - 99%)          10-18 @ 07:01  -  10-19 @ 07:00  --------------------------------------------------------  IN: 1120 mL / OUT: 700 mL / NET: 420 mL          LABS:                        8.3    5.29  )-----------( 125      ( 19 Oct 2020 05:05 )             27.9     10-19    127<L>  |  97  |  8   ----------------------------<  77  3.6   |  22  |  <0.30<L>    Ca    8.1<L>      19 Oct 2020 05:05            CAPILLARY BLOOD GLUCOSE      POCT Blood Glucose.: 125 mg/dL (19 Oct 2020 08:29)      Urinalysis Basic - ( 17 Oct 2020 22:39 )    Color: Colorless / Appearance: Clear / S.007 / pH: x  Gluc: x / Ketone: Small  / Bili: Negative / Urobili: Negative   Blood: x / Protein: 30 mg/dL / Nitrite: Negative   Leuk Esterase: Negative / RBC: 1 /hpf / WBC 0 /HPF   Sq Epi: x / Non Sq Epi: 0 /hpf / Bacteria: Negative          CULTURES:     Culture - Blood (collected 10-17-20 @ 18:24)  Source: .Blood Blood-Peripheral  Preliminary Report (10-18-20 @ 19:02):    No growth to date.    Culture - Blood (collected 10-17-20 @ 18:24)  Source: .Blood Blood-Peripheral  Preliminary Report (10-18-20 @ 19:02):    No growth to date.    Culture - Blood (collected 10-14-20 @ 17:44)  Source: .Blood Blood  Preliminary Report (10-15-20 @ 18:02):    No growth to date.    Culture - Blood (collected 10-14-20 @ 17:44)  Source: .Blood Blood  Preliminary Report (10-15-20 @ 18:02):    No growth to date.        Culture - Urine (collected 10-14-20 @ 18:50)  Source: .Urine Catheterized  Final Report (10-15-20 @ 17:01):    No growth        Physical Examination:  PULM: Clear to auscultation bilaterally, no significant sputum production  CVS: RRR    RADIOLOGY REVIEWED  CT chest: t< from: CT Angio Chest w/ IV Cont (10.14.20 @ 21:30) >  FINDINGS:    LUNGS AND AIRWAYS: Low lung volumes. Elevated diaphragm. Interval improvement in bilateral posterior and dependent consolidations previously seen on chest CT from 10/7/2020.  PLEURA: Bilateral small left and trace right pleural effusions.  MEDIASTINUM AND ROSEMARY: No lymphadenopathy.  VESSELS: The main pulmonary artery measures 2.1 cm. There is no main, central, lobar, or segmental pulmonary embolus. Right upper extremity PICC with tip terminating in the right atrium. Calcified and noncalcified ulcerative plaque involving the aorta.  HEART: Heart size is normal. No pericardial effusion. Coronary arterial calcifications. No signs of right heart strain.  CHEST WALL AND LOWER NECK: Within normal limits.  VISUALIZED UPPER ABDOMEN: Embolization coil mass in the gastrohepatic space.  BONES: Degenerative changes.    IMPRESSION:    No main, lobar, or segmental pulmonary embolism. Limited evaluation the subsegmental pulmonary arteries due to respiratory motion.    Improvement in bilateral atelectasis seen on chest CT from 10/7/2020. Small left and trace right pleural effusions.    < end of copied text >

## 2020-10-19 NOTE — DISCHARGE NOTE PROVIDER - NSDCCPCAREPLAN_GEN_ALL_CORE_FT
PRINCIPAL DISCHARGE DIAGNOSIS  Diagnosis: Pneumonia of left lower lobe due to infectious organism  Assessment and Plan of Treatment: Due Listeria   Discharge on Meropeneme IV for 10 more days and follow up with weekly chemistry lab and CBC with Diff      SECONDARY DISCHARGE DIAGNOSES  Diagnosis: Rheumatoid arthritis involving multiple sites, unspecified whether rheumatoid factor present  Assessment and Plan of Treatment: Rheumatoid arthritis involving multiple sites, unspecified whether rheumatoid factor present    Diagnosis: Acute respiratory failure with hypoxia and hypercapnia  Assessment and Plan of Treatment:     Diagnosis: Controlled type 2 diabetes mellitus without complication, without long-term current use of insulin  Assessment and Plan of Treatment:     Diagnosis: Severe sepsis  Assessment and Plan of Treatment:      PRINCIPAL DISCHARGE DIAGNOSIS  Diagnosis: Pneumonia of left lower lobe due to infectious organism  Assessment and Plan of Treatment: Due Listeria   Discharge on Meropeneme IV for 10 more days and follow up with weekly chemistry lab and CBC with Diff      SECONDARY DISCHARGE DIAGNOSES  Diagnosis: Rheumatoid arthritis involving multiple sites, unspecified whether rheumatoid factor present  Assessment and Plan of Treatment: follow up with Outpatient Rhemuology    Diagnosis: Acute respiratory failure with hypoxia and hypercapnia  Assessment and Plan of Treatment: Call your Health Care provider upon arrival home to make a follow up appointment within one week.  Take all inhalers as prescribed by your Health Care Provider.  Take steroids as prescribed by your Health Care Provider.  If your cough increases infrequency and severity and/or you have shortness of breath or increased shortness of breath call your Health Care Provider.  If you develop fever, chills, night sweats, malaise, and/or change in mental status call your Health care Provider.  Nutrition is very important.  Eat small frequent meals.  Increase your activity as tolerated.  Do not stay in bed all day      Diagnosis: Controlled type 2 diabetes mellitus without complication, without long-term current use of insulin  Assessment and Plan of Treatment: HgA1C this admission.  Make sure you get your HgA1c checked every three months.  If you take oral diabetes medications, check your blood glucose two times a day.  If you take insulin, check your blood glucose before meals and at bedtime.  It's important not to skip any meals.  Keep a log of your blood glucose results and always take it with you to your doctor appointments.  Keep a list of your current medications including injectables and over the counter medications and bring this medication list with you to all your doctor appointments.  If you have not seen your opthalmologist this year call for appointment.  Check your feet daily for redness, sores, or openings. Do not self treat. If no improvement in two days call your primary care physician for an appointment.  Low blood sugar (hypoglycemia) is a blood sugar below 70mg/dl. Check your blood sugar if you feel signs/symptoms of hypoglycemia. If your blood sugar is below 70 take 15 grams of carbohydrates (ex 4 oz of apple juice, 3-4 glucosr tablets, or 4-6 oz of regular soda) wait 15 minutes and repeat blood sugar to make sure it comes up above 70.  If your blood sugar is above 70 and you are due for a meal, have a meal.  If you are not due for a meal have a snack.  This snack helps keeps your blood sugar at a safe range.      Diagnosis: Severe sepsis  Assessment and Plan of Treatment: Take all antibiotics as ordered.  Call you Health care provider upon arrival home to make a one week follow up appointment.  If you develop fever, chills, malaise, or change in mental status call your Health Care Provider or go to the Emergency Department.  Nutrition is important, eat small frequent meals to help ensure you get adequate calories.  Do not stay in bed all day!  Increase your activity daily as tolerated.       Diagnosis: Hypothyroid  Assessment and Plan of Treatment: you do not make enough thyroid hormone  signs & symptoms of low levels of thyroid hormone - tired, getting cold easily, coarse or thin hair, constipation, shortness of breath, swelling, irregular periods  your doctor will do thyroid hormone blood tests at least once a year to monitor if medication dose is adequate  take your thyroid medicine as directed by your doctor

## 2020-10-19 NOTE — PROGRESS NOTE ADULT - SUBJECTIVE AND OBJECTIVE BOX
CC: f/u for Listeria bacteremia    Patient with out complaints today, she reports that she is eager to go home    REVIEW OF SYSTEMS:  All other review of systems negative (Comprehensive ROS)    Antimicrobials Day #  :  meropenem  IVPB 2000 milliGRAM(s) IV Intermittent every 8 hours    Other Medications Reviewed  Vital Signs Last 24 Hrs  T(C): 36.9 (19 Oct 2020 11:59), Max: 37.4 (19 Oct 2020 07:54)  T(F): 98.5 (19 Oct 2020 11:59), Max: 99.4 (19 Oct 2020 07:54)  HR: 90 (19 Oct 2020 11:59) (89 - 108)  BP: 95/58 (19 Oct 2020 11:59) (95/58 - 175/76)  BP(mean): --  RR: 18 (19 Oct 2020 11:59) (18 - 18)  SpO2: 98% (19 Oct 2020 11:59) (95% - 99%)    PHYSICAL EXAM:  General: alert, no acute distress  Eyes:  anicteric, no conjunctival injection, no discharge  Neck: supple, without adenopathy  Lungs: clear to auscultation  Heart: regular rate and rhythm; no murmurs  Abdomen: soft, nondistended, nontender.  Skin: no lesions  Extremities: no clubbing or cyanosis. + edema                    RUE PICC   Neurologic: alert & oriented. Weak extremities      LAB RESULTS:                          8.3    5.29  )-----------( 125      ( 19 Oct 2020 05:05 )             27.9     10-19    127<L>  |  97  |  8   ----------------------------<  77  3.6   |  22  |  <0.30<L>    Ca    8.1<L>      19 Oct 2020 05:05          Urinalysis Basic - ( 17 Oct 2020 22:39 )    Color: Colorless / Appearance: Clear / S.007 / pH: x  Gluc: x / Ketone: Small  / Bili: Negative / Urobili: Negative   Blood: x / Protein: 30 mg/dL / Nitrite: Negative   Leuk Esterase: Negative / RBC: 1 /hpf / WBC 0 /HPF   Sq Epi: x / Non Sq Epi: 0 /hpf / Bacteria: Negative      MICROBIOLOGY:  RECENT CULTURES:  10- @ 18:50 .Urine Catheterized     No growth      10-14 @ 17:44 .Blood Blood     No growth to date.        RADIOLOGY REVIEWED:

## 2020-10-19 NOTE — PROGRESS NOTE ADULT - SUBJECTIVE AND OBJECTIVE BOX
[facility] [phone]        Patient seen and examined in bed.  She was in good spirits         MEDICATIONS  (STANDING):  artificial  tears Solution 1 Drop(s) Both EYES two times a day  chlorhexidine 2% Cloths 1 Application(s) Topical daily  cycloSPORINE (RESTASIS) 0.05% Emulsion 1 Drop(s) Both EYES two times a day  dextrose 5%. 1000 milliLiter(s) (50 mL/Hr) IV Continuous <Continuous>  dextrose 50% Injectable 12.5 Gram(s) IV Push once  dextrose 50% Injectable 25 Gram(s) IV Push once  dextrose 50% Injectable 25 Gram(s) IV Push once  fondaparinux Injectable 2.5 milliGRAM(s) SubCutaneous daily  insulin lispro (HumaLOG) corrective regimen sliding scale   SubCutaneous three times a day before meals  insulin lispro (HumaLOG) corrective regimen sliding scale   SubCutaneous at bedtime  levothyroxine Injectable 75 MICROGram(s) IV Push at bedtime  lisinopril 2.5 milliGRAM(s) Oral daily  meropenem  IVPB 2000 milliGRAM(s) IV Intermittent every 8 hours  metoprolol succinate ER 12.5 milliGRAM(s) Oral daily  polyethylene glycol 3350 17 Gram(s) Oral daily  prednisoLONE acetate 1% Suspension 1 Drop(s) Both EYES two times a day  predniSONE   Tablet 5 milliGRAM(s) Oral daily  senna 2 Tablet(s) Oral at bedtime      VITAL:  T(C): , Max: 37.4 (10-19-20 @ 07:54)  T(F): , Max: 99.4 (10-19-20 @ 07:54)  HR: 90 (10-19-20 @ 11:59)  BP: 95/58 (10-19-20 @ 11:59)  BP(mean): --  RR: 18 (10-19-20 @ 11:59)  SpO2: 98% (10-19-20 @ 11:59)  Wt(kg): --    I and O's:    10-18 @ 07:01  -  10-19 @ 07:00  --------------------------------------------------------  IN: 1120 mL / OUT: 700 mL / NET: 420 mL    10-19 @ 07:01  -  10-19 @ 15:10  --------------------------------------------------------  IN: 120 mL / OUT: 0 mL / NET: 120 mL          PHYSICAL EXAM:    Constitutional: NAD  Neck:  No JVD  Respiratory: CTAB/L  Cardiovascular: S1 and S2  Gastrointestinal: BS+, soft, NT/ND  Extremities: No peripheral edema  Neurological: A/O x 3, no focal deficits  Psychiatric: Normal mood, normal affect  : No Mcbride  Skin: No rashes  Access: Not applicable    LABS:                        8.3    5.29  )-----------( 125      ( 19 Oct 2020 05:05 )             27.9     10-19    127<L>  |  97  |  8   ----------------------------<  77  3.6   |  22  |  <0.30<L>    Ca    8.1<L>      19 Oct 2020 05:05            Urine Studies:  Urinalysis Basic - ( 17 Oct 2020 22:39 )    Color: Colorless / Appearance: Clear / S.007 / pH: x  Gluc: x / Ketone: Small  / Bili: Negative / Urobili: Negative   Blood: x / Protein: 30 mg/dL / Nitrite: Negative   Leuk Esterase: Negative / RBC: 1 /hpf / WBC 0 /HPF   Sq Epi: x / Non Sq Epi: 0 /hpf / Bacteria: Negative            RADIOLOGY & ADDITIONAL STUDIES:

## 2020-10-19 NOTE — PROGRESS NOTE ADULT - ASSESSMENT
ASSESSMENT/PLAN:  This is a 70/F w/ RA, DM, HTN, fibromyalgia, hypothyroidism, brain aneurysm s/p repair BIBEMS for AMS.  Found in respiratory distress likely 2/2 multifocal PNA, intubated for hypoxemic respiratory failure.  Extubated in MICU w/o complication.  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  SHe is a vegetarian and gets her protein intake from nonanimal meats.  This could also be from hypotension that will lead to pre-renal azotemia(and decrease in ability to excrete free water)  Urine indices are not c/w SIADH     1 Rheum-At present no need for stress steroids   2 Renal- Increase Glucerna to tid and no salt restriction; The abx has a large salt load which will help;   [id number] po x 1   3 ID- On IV abx   4 CVS-DC the  lisinopril as the BP is soft     Sayed Yoselin   [facility]  [phone]

## 2020-10-19 NOTE — DISCHARGE NOTE PROVIDER - NSDCFUADDAPPT_GEN_ALL_CORE_FT
Please follow up with PMD in 1-2 Weeks   Please  Follow up with infectious disease outpatient in 1 week   Patient need a weekly CBC /CMP weekly   Please follow up with Endocrinologist after discharge Please follow up with PMD in 1-2 Weeks   Please  Follow up with infectious disease outpatient in 1 week   Patient need a weekly CBC /CMP weekly   Please follow up with Endocrinologist after discharge     -Recommend outpatient Nuclear Stress Test   - No further inpatient cardiac needed  - Patient to follow up  with own cardiologist Dr. Woods after discharge

## 2020-10-19 NOTE — PROGRESS NOTE ADULT - ASSESSMENT
69 yo F with history of RA, DM, HTN, fibromyalgia, hypothyroidism, brain aneurysm s/p repair (2004?) BIBEMS for shortness of breath and fever. Per EMS, patient has had 3 days of fever, Tmax 102.5 , no known COVID exposures, took tylenol at unknown time before being brought in. patient didn't take her med for 2 days prior to admission. Patient c/o feeling fatigued, short of breath. Denies chest pain. Patient uses a wheelchair at baseline 2/2 arthritis. Intubated and in ICU extubated on 10/9 then transferred to floor.  Neurology called for dizzy sensation and h/o aneurysm.  Na improving 129-->130 today, Hgb also improving. 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 CTH obtained unremarkable. TTE with LVH, A1c7.3%  o/e proximal weakness> distal. Dizziness may be multifactorial 2/2 anemia and hyponatremia. dizziness now improved.   - dizziness improved no need for MRI brain.   - hyponatremia Na 127 today. slow correction, f/u renal  - monitor Hgb improved   - need to better eval h/o aneurysm? CTA H/N   - check orthostatics if she is able to sit up. wheelchair bound  - pending NST per cardio can be outpatient?  - telemetry  - PT/OT, OOBC  - check FS, glucose control <180  - GI/DVT ppx  - Counseling on diet, exercise, and medication adherence was done  - Counseling on smoking cessation and alcohol consumption offered when appropriate.  - Pain assessed and judicious use of narcotics when appropriate was discussed.    - Stroke education given when appropriate.  - Importance of fall prevention discussed.   - Differential diagnosis and plan of care discussed with patient and/or family and primary team  - Thank you for allowing me to participate in the care of this patient. Call with questions.   - dispo planning   Dez Madera MD  Vascular Neurology  Office: [phone]

## 2020-10-19 NOTE — PROGRESS NOTE ADULT - SUBJECTIVE AND OBJECTIVE BOX
Neurology Progress Note    S: Patient seen and examined. No new events overnight. patient denied CP, SOB, HA. dizziness improved. still with proximal muscle weakness and pain. wants to go home but pending NST.     Medication:  artificial  tears Solution 1 Drop(s) Both EYES two times a day  chlorhexidine 2% Cloths 1 Application(s) Topical daily  cycloSPORINE (RESTASIS) 0.05% Emulsion 1 Drop(s) Both EYES two times a day  dextrose 40% Gel 15 Gram(s) Oral once PRN  dextrose 5%. 1000 milliLiter(s) IV Continuous <Continuous>  dextrose 50% Injectable 12.5 Gram(s) IV Push once  dextrose 50% Injectable 25 Gram(s) IV Push once  dextrose 50% Injectable 25 Gram(s) IV Push once  fondaparinux Injectable 2.5 milliGRAM(s) SubCutaneous daily  glucagon  Injectable 1 milliGRAM(s) IntraMuscular once PRN  hydrALAZINE Injectable 5 milliGRAM(s) IV Push every 6 hours PRN  insulin lispro (HumaLOG) corrective regimen sliding scale   SubCutaneous three times a day before meals  insulin lispro (HumaLOG) corrective regimen sliding scale   SubCutaneous at bedtime  levothyroxine Injectable 75 MICROGram(s) IV Push at bedtime  lisinopril 10 milliGRAM(s) Oral daily  meropenem  IVPB 2000 milliGRAM(s) IV Intermittent every 8 hours  polyethylene glycol 3350 17 Gram(s) Oral daily  prednisoLONE acetate 1% Suspension 1 Drop(s) Both EYES two times a day  predniSONE   Tablet 5 milliGRAM(s) Oral daily  senna 2 Tablet(s) Oral at bedtime  simethicone 80 milliGRAM(s) Chew three times a day PRN      Vitals:  Vital Signs Last 24 Hrs  T(C): 36.8 (16 Oct 2020 05:01), Max: 37.1 (15 Oct 2020 20:54)  T(F): 98.3 (16 Oct 2020 05:01), Max: 98.7 (15 Oct 2020 20:54)  HR: 102 (16 Oct 2020 05:01) (70 - 102)  BP: 149/84 (16 Oct 2020 05:21) (100/64 - 149/84)  BP(mean): --  RR: 18 (16 Oct 2020 05:21) (18 - 18)  SpO2: 98% (16 Oct 2020 05:21) (96% - 98%)    General Exam:   General Appearance: Appropriately dressed and in no acute distress       Head: Normocephalic, atraumatic and no dysmorphic features  Ear, Nose, and Throat: Moist mucous membranes  CVS: S1S2+  Resp: mild SOB but no wheeze  GI: soft NT/ND  Extremeties: mild LE edema no  cyanosis  Skin: some bruises from IV     Neurological Exam:  Mental Status: Awake, alert and oriented x 3.  Able to follow simple and complex verbal commands. Able to name and repeat. fluent speech. No obvious aphasia or dysarthria noted.   Cranial Nerves: PERRL, EOMI, VFFC, sensation V1-V3 intact,  no obvious facial assymetry, equal elevation of palate, scm/trap 5/5, tongue is midline on protrusion. no obvious papilledema on fundoscopic exam. hearing is grossly intact.   Motor: GAMA but proximally limited motion 1/5 in UE and LE proximally, but distally x 4 she is 4/5 (baseline?)   Sensation: Intact to light touch and pinprick throughout. no right/left confusion.   Reflexes: 1+ throughout at biceps, brachioradialis, triceps, patellars and ankles bilaterally and equal. No clonus. R toe and L toe were both downgoing.  Coordination: unable   Gait: wheelchair bound    I personally reviewed the below data/images/labs:      artificial  tears Solution 1 Drop(s) Both EYES two times a day  chlorhexidine 2% Cloths 1 Application(s) Topical daily  cycloSPORINE (RESTASIS) 0.05% Emulsion 1 Drop(s) Both EYES two times a day  dextrose 40% Gel 15 Gram(s) Oral once PRN  dextrose 5%. 1000 milliLiter(s) IV Continuous <Continuous>  dextrose 50% Injectable 12.5 Gram(s) IV Push once  dextrose 50% Injectable 25 Gram(s) IV Push once  dextrose 50% Injectable 25 Gram(s) IV Push once  fondaparinux Injectable 2.5 milliGRAM(s) SubCutaneous daily  glucagon  Injectable 1 milliGRAM(s) IntraMuscular once PRN  hydrALAZINE Injectable 5 milliGRAM(s) IV Push every 6 hours PRN  insulin lispro (HumaLOG) corrective regimen sliding scale   SubCutaneous three times a day before meals  insulin lispro (HumaLOG) corrective regimen sliding scale   SubCutaneous at bedtime  levothyroxine Injectable 75 MICROGram(s) IV Push at bedtime  lisinopril 10 milliGRAM(s) Oral daily  meropenem  IVPB 2000 milliGRAM(s) IV Intermittent every 8 hours  polyethylene glycol 3350 17 Gram(s) Oral daily  prednisoLONE acetate 1% Suspension 1 Drop(s) Both EYES two times a day  predniSONE   Tablet 5 milliGRAM(s) Oral daily  senna 2 Tablet(s) Oral at bedtime  simethicone 80 milliGRAM(s) Chew three times a day PRN    CBC Full  -  ( 16 Oct 2020 06:13 )  WBC Count : 6.96 K/uL  RBC Count : 3.55 M/uL  Hemoglobin : 8.7 g/dL  Hematocrit : 29.1 %  Platelet Count - Automated : 171 K/uL  Mean Cell Volume : 82.0 fl  Mean Cell Hemoglobin : 24.5 pg  Mean Cell Hemoglobin Concentration : 29.9 gm/dL  Auto Neutrophil # : x  Auto Lymphocyte # : x  Auto Monocyte # : x  Auto Eosinophil # : x  Auto Basophil # : x  Auto Neutrophil % : x  Auto Lymphocyte % : x  Auto Monocyte % : x  Auto Eosinophil % : x  Auto Basophil % : x    10-16    130<L>  |  97  |  8   ----------------------------<  124<H>  4.3   |  22  |  <0.30<L>    Ca    8.5      16 Oct 2020 06:13          Urinalysis Basic - ( 14 Oct 2020 15:24 )    Color: Light Yellow / Appearance: Clear / S.013 / pH: x  Gluc: x / Ketone: Trace  / Bili: Negative / Urobili: Negative   Blood: x / Protein: Trace / Nitrite: Negative   Leuk Esterase: Negative / RBC: 3 /hpf / WBC 1 /HPF   Sq Epi: x / Non Sq Epi: 2 /hpf / Bacteria: Negative            CTA chest: < from: CT Angio Chest w/ IV Cont (10.14.20 @ 21:30) >  FINDINGS:    LUNGS AND AIRWAYS: Low lung volumes. Elevated diaphragm. Interval improvement in bilateral posterior and dependent consolidations previously seen on chest CT from 10/7/2020.  PLEURA: Bilateral small left and trace right pleural effusions.  MEDIASTINUM AND ROSEMARY: No lymphadenopathy.  VESSELS: The main pulmonary artery measures 2.1 cm. There is no main, central, lobar, or segmental pulmonary embolus. Right upper extremity PICC with tip terminating in the right atrium. Calcified and noncalcified ulcerative plaque involving the aorta.  HEART: Heart size is normal. No pericardial effusion. Coronary arterial calcifications. No signs of right heart strain.  CHEST WALL AND LOWER NECK: Within normal limits.  VISUALIZED UPPER ABDOMEN: Embolization coil mass in the gastrohepatic space.  BONES: Degenerative changes.    IMPRESSION:    No main, lobar, or segmental pulmonary embolism. Limited evaluation the subsegmental pulmonary arteries due to respiratory motion.    Improvement in bilateral atelectasis seen on chest CT from 10/7/2020. Small left and trace right pleural effusions.      CTH 10/14/20  IMPRESSION: No acute intracranial hemorrhage, mass effect, or shift of the midline structures.    Mild interval advancement of chronic white matter microvascular type changes.    TTE: 1. Calcified trileaflet aortic valve with decreased  opening. Peak transaortic valve gradient equals 16 mm Hg,  mean transaortic valve gradient equals 8 mm Hg, estimated  aortic valve area equals 1.6 sqcm (by continuity equation),  aortic valve velocity time integral equals 28 cm,  consistent with mild aortic stenosis.  2. Increased relative wall thickness with normal left  ventricular mass index, consistent with concentric left  ventricular remodeling.  3. Normal left ventricular systolic function. No segmental  wall motion abnormalities.  4. Normal right ventricular size and function.  *** No previous Echo exam.  Unable to rule out endocarditis, consider JAIME if clinicaly  indicated.  Hgb A1c 7.3

## 2020-10-19 NOTE — DISCHARGE NOTE PROVIDER - NSDCMRMEDTOKEN_GEN_ALL_CORE_FT
calcium acetate: 1000 milligram(s) orally once a day  Chelated Magnesium: 400 milligram(s) orally once a day  Chelated Zinc oral tablet: 25 milligram(s) orally once a day  etanercept 50 mg/mL subcutaneous solution: subcutaneous once a day  folic acid 0.8 mg oral tablet: 1 tab(s) orally once a day  GenTeal ophthalmic gel: 1 dose(s) to each affected eye 2 times a day  glipiZIDE 5 mg oral tablet: 1 tab(s) orally once a day  metFORMIN 500 mg oral tablet: 1 tab(s) orally 2 times a day  prednisoLONE acetate 1% ophthalmic suspension: 1 dose(s) to each affected eye 2 times a day  predniSONE 5 mg oral tablet: 1 tab(s) orally 2 times a day  Prolia 60 mg/mL subcutaneous solution: subcutaneous every 6 months  ramipril 5 mg oral tablet: orally once a day  Restasis 0.05% ophthalmic emulsion: 1 drop(s) to each affected eye every 12 hours  solifenacin 5 mg oral tablet: 1 tab(s) orally once a day  Synthroid 50 mcg (0.05 mg) oral tablet: 1 tab(s) orally once a day  Systane Ultra ophthalmic solution: 1 dose(s) to each affected eye 2 times a day  Vitamin D3 5000 intl units (125 mcg) oral tablet: orally once a day   calcium acetate: 1000 milligram(s) orally once a day  Chelated Magnesium: 400 milligram(s) orally once a day  Chelated Zinc oral tablet: 25 milligram(s) orally once a day  folic acid 0.8 mg oral tablet: 1 tab(s) orally once a day  GenTeal ophthalmic gel: 1 dose(s) to each affected eye 2 times a day  glipiZIDE 5 mg oral tablet: 1 tab(s) orally once a day  Lab work :   meropenem 1000 mg intravenous injection: 2000 milligram(s) intravenous every 8 hours for 10 more days   metFORMIN 500 mg oral tablet: 1 tab(s) orally 2 times a day  polyethylene glycol 3350 oral powder for reconstitution: 17 gram(s) orally once a day, As Needed  prednisoLONE acetate 1% ophthalmic suspension: 1 dose(s) to each affected eye 2 times a day  predniSONE 5 mg oral tablet: 1 tab(s) orally 2 times a day  Prolia 60 mg/mL subcutaneous solution: subcutaneous every 6 months  ramipril 5 mg oral tablet: orally once a day  Restasis 0.05% ophthalmic emulsion: 1 drop(s) to each affected eye every 12 hours  senna oral tablet: 2 tab(s) orally once a day (at bedtime)  simethicone 80 mg oral tablet, chewable: 1 tab(s) orally 3 times a day, As needed, Gas  solifenacin 5 mg oral tablet: 1 tab(s) orally once a day  Synthroid 50 mcg (0.05 mg) oral tablet: 1 tab(s) orally once a day  Systane Ultra ophthalmic solution: 1 dose(s) to each affected eye 2 times a day  Vitamin D3 5000 intl units (125 mcg) oral tablet: orally once a day   calcium acetate: 1000 milligram(s) orally once a day  Chelated Magnesium: 400 milligram(s) orally once a day  Chelated Zinc oral tablet: 25 milligram(s) orally once a day  folic acid 0.8 mg oral tablet: 1 tab(s) orally once a day  GenTeal ophthalmic gel: 1 dose(s) to each affected eye 2 times a day  glipiZIDE 5 mg oral tablet: 1 tab(s) orally once a day  hydrALAZINE 10 mg oral tablet: 1 tab(s) orally every 8 hours   for Blood Pressure &gt; 170   Lab work :   meropenem 1000 mg intravenous injection: 2000 milligram(s) intravenous every 8 hours for 10 more days   metFORMIN 500 mg oral tablet: 1 tab(s) orally 2 times a day  polyethylene glycol 3350 oral powder for reconstitution: 17 gram(s) orally once a day, As Needed  prednisoLONE acetate 1% ophthalmic suspension: 1 dose(s) to each affected eye 2 times a day  predniSONE 5 mg oral tablet: 1 tab(s) orally 2 times a day  Prolia 60 mg/mL subcutaneous solution: subcutaneous every 6 months  ramipril 5 mg oral tablet: orally once a day  Restasis 0.05% ophthalmic emulsion: 1 drop(s) to each affected eye every 12 hours  senna oral tablet: 2 tab(s) orally once a day (at bedtime)  simethicone 80 mg oral tablet, chewable: 1 tab(s) orally 3 times a day, As needed, Gas  solifenacin 5 mg oral tablet: 1 tab(s) orally once a day  Synthroid 50 mcg (0.05 mg) oral tablet: 1 tab(s) orally once a day  Systane Ultra ophthalmic solution: 1 dose(s) to each affected eye 2 times a day  Toprol-XL 25 mg oral tablet, extended release: 0.5 tab(s) orally once a day   Vitamin D3 5000 intl units (125 mcg) oral tablet: orally once a day   acetaminophen 325 mg oral tablet: 2 tab(s) orally every 6 hours, As needed, Mild Pain (1 - 3)  calcium acetate: 1000 milligram(s) orally once a day  Chelated Magnesium: 400 milligram(s) orally once a day  Chelated Zinc oral tablet: 25 milligram(s) orally once a day  folic acid 0.8 mg oral tablet: 1 tab(s) orally once a day  GenTeal ophthalmic gel: 1 dose(s) to each affected eye 2 times a day  glipiZIDE 5 mg oral tablet: 1 tab(s) orally once a day  Lab work :   metFORMIN 500 mg oral tablet: 1 tab(s) orally 2 times a day  pantoprazole 40 mg oral delayed release tablet: 1 tab(s) orally once a day (before a meal)  polyethylene glycol 3350 oral powder for reconstitution: 17 gram(s) orally once a day, As Needed  prednisoLONE acetate 1% ophthalmic suspension: 1 dose(s) to each affected eye 2 times a day  predniSONE 5 mg oral tablet: 1 tab(s) orally once a day  Prolia 60 mg/mL subcutaneous solution: subcutaneous every 6 months  Restasis 0.05% ophthalmic emulsion: 1 drop(s) to each affected eye every 12 hours  senna oral tablet: 2 tab(s) orally once a day (at bedtime)  simethicone 80 mg oral tablet, chewable: 1 tab(s) orally 3 times a day, As needed, Gas  solifenacin 5 mg oral tablet: 1 tab(s) orally once a day  sulfamethoxazole-trimethoprim 800 mg-160 mg oral tablet: 2 tab(s) orally 2 times a day  Synthroid 50 mcg (0.05 mg) oral tablet: 1 tab(s) orally once a day  Systane Ultra ophthalmic solution: 1 dose(s) to each affected eye 2 times a day  Toprol-XL 25 mg oral tablet, extended release: 1 tab(s) orally once a day   Vitamin D3 5000 intl units (125 mcg) oral tablet: orally once a day   acetaminophen 325 mg oral tablet: 2 tab(s) orally every 6 hours, As needed, Mild Pain (1 - 3)  alcohol swabs : Apply topically to affected area 4 times a day   calcium acetate: 1000 milligram(s) orally once a day  Chelated Magnesium: 400 milligram(s) orally once a day  Chelated Zinc oral tablet: 25 milligram(s) orally once a day  folic acid 0.8 mg oral tablet: 1 tab(s) orally once a day  GenTeal ophthalmic gel: 1 dose(s) to each affected eye 2 times a day  glipiZIDE 5 mg oral tablet: 1 tab(s) orally once a day  glucometer (per patient&#x27;s insurance): Test blood sugars four times a day. Dispense #1 glucometer.  Lab work :   lancets: 1 application subcutaneously 4 times a day   metFORMIN 500 mg oral tablet: 1 tab(s) orally 2 times a day  pantoprazole 40 mg oral delayed release tablet: 1 tab(s) orally once a day (before a meal)  polyethylene glycol 3350 oral powder for reconstitution: 17 gram(s) orally once a day, As Needed  prednisoLONE acetate 1% ophthalmic suspension: 1 dose(s) to each affected eye 2 times a day  predniSONE 5 mg oral tablet: 1 tab(s) orally once a day  Prolia 60 mg/mL subcutaneous solution: subcutaneous every 6 months  Restasis 0.05% ophthalmic emulsion: 1 drop(s) to each affected eye every 12 hours  senna oral tablet: 2 tab(s) orally once a day (at bedtime)  simethicone 80 mg oral tablet, chewable: 1 tab(s) orally 3 times a day, As needed, Gas  solifenacin 5 mg oral tablet: 1 tab(s) orally once a day  sulfamethoxazole-trimethoprim 800 mg-160 mg oral tablet: 2 tab(s) orally 2 times a day  Synthroid 50 mcg (0.05 mg) oral tablet: 1 tab(s) orally once a day  Systane Ultra ophthalmic solution: 1 dose(s) to each affected eye 2 times a day  test strips (per patient&#x27;s insurance): 1 application subcutaneously 4 times a day. ** Compatible with patient&#x27;s glucometer **  Toprol-XL 25 mg oral tablet, extended release: 1 tab(s) orally once a day   Vitamin D3 5000 intl units (125 mcg) oral tablet: orally once a day

## 2020-10-19 NOTE — DISCHARGE NOTE PROVIDER - HOSPITAL COURSE
72 yo female with RA, DM, HTN, fibromyalgia, immune suppressed at home on Enbrel and 5 mg of prednisone bid  and remote repair of cerebral aneurysm now admitted with fever, weakness and hypoxia.  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  Required vent & pressors support in ICU for shock - shock resolved.   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  She recalls a rash to PCN, therefor is on meropenem which she appears to be tolerating  RVP is negative, MRSA screen positive.  TTE with mild AS, no vegetations.  Repeat 10/9 blood cultures are NG to date  She had developed pancytopenia, now improved.  S/p RUE PICC placement   Transient fever of 100.4 F on 10/14 - was pancultured  CTA chest neg for PE, CTH no acute event  Urine cx no growth   Recurrent fever of 100.2F on 10/17/20 - patient however, is asymptomatic & afebrile today  Repeat blood cx so far no growth     Suggest:  Continue meropenem for listeria bacteremia and concerns of pneumonia  Anticipate a 3 wks course of antibiotics total & today is D # 11/21  F/u cultures     70 yo female with RA, DM, HTN, fibromyalgia, immune suppressed at home on Enbrel and 5 mg of prednisone bid  and remote repair of cerebral aneurysm now admitted with fever, weakness and hypoxia.  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 Admitted with Sepsis   Required vent & pressors support in ICU for shock - shock resolved.   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 CTH obtained unremarkable. TTE with LVH, A1c7.3%  o/e proximal weakness> distal. Dizziness may be multifactorial 2/2 anemia and hyponatremia. dizziness now improved.   - dizziness improved no need for MRI brain.   Mild headaches; resolved.  She recalls a rash to PCN, therefor is on meropenem which she appears to be tolerating  RVP is negative, MRSA screen positive.  TTE with mild AS, no vegetations.   Repeat 10/9 blood cultures are NG to date  She had developed pancytopenia, now improved.  S/p RUE PICC placement   Transient fever of 100.4 F on 10/14 - was pancultured  CTA chest neg for PE, CTH no acute event  Urine cx no growth   Recurrent fever of 100.2F on 10/17/20 - patient however, is asymptomatic & afebrile today  Repeat blood cx so far no growth   Treated  meropenem for listeria bacteremia and concerns of pneumonia  Anticipate a 3 wks course of antibiotics total & today is D # 11/2   hyponatremia Na 127 today. slow correction, f/u sergio  patient medically stable for discharge 72 yo female with RA, DM, HTN, fibromyalgia, immune suppressed at home on Enbrel and 5 mg of prednisone bid  and remote repair of cerebral aneurysm now admitted with fever, weakness and hypoxia.  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 Admitted with Sepsis   Required vent & pressors support in ICU for shock - shock resolved.   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 CTH obtained unremarkable. TTE with LVH, A1c7.3%  o/e proximal weakness> distal. Dizziness may be multifactorial 2/2 anemia and hyponatremia. dizziness now improved.   - dizziness improved no need for MRI brain.   Mild headaches; resolved.  She recalls a rash to PCN, therefor is on meropenem which she appears to be tolerating  RVP is negative, MRSA screen positive.  TTE with mild AS, no vegetations.   Repeat 10/9 blood cultures are NG to date  She had developed pancytopenia, now improved.  S/p RUE PICC placement   Transient fever of 100.4 F on 10/14 - was pancultured  CTA chest neg for PE, CTH no acute event  Urine cx no growth   Recurrent fever of 100.2F on 10/17/20 - patient however, is asymptomatic & afebrile today  Repeat blood cx so far no growth   Treated  meropenem for listeria bacteremia and concerns of pneumonia  Anticipate a 3 wks course of antibiotics total & today is D # 11/2   hyponatremia Na 127 today. slow correction, f/u sergio  patient medically stable for discharge   - Repeat echo confirms normal LVEF (no wall motion abnormalities) with a hyperdynamic LV creating moderate LVOT obstruction from MANSOOR.   - Recommend beta blockers, avoiding dehydration, avoiding afterload reducers - would d/c lisinopril and increase Toprol XL to 25mg daily  -   70 yo female with RA, DM, HTN, fibromyalgia, immune suppressed at home on Enbrel and 5 mg of prednisone bid  and remote repair of cerebral aneurysm now admitted with fever, weakness and hypoxia.  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 Admitted with Sepsis   Required vent & pressors support in ICU for shock - shock resolved.   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 CTH obtained unremarkable. TTE with LVH, A1c7.3%  o/e proximal weakness> distal. Dizziness may be multifactorial 2/2 anemia and hyponatremia. dizziness now improved.   - dizziness improved no need for MRI brain.   Mild headaches; resolved.  She recalls a rash to PCN, therefor is on meropenem which she appears to be tolerating  RVP is negative, MRSA screen positive.  TTE with mild AS, no vegetations.   Repeat 10/9 blood cultures are NG to date  She had developed pancytopenia, now improved.  S/p RUE PICC placement   Transient fever of 100.4 F on 10/14 - was pancultured  CTA chest neg for PE, CTH no acute event  Urine cx no growth   Recurrent fever of 100.2F on 10/17/20 - patient however, is asymptomatic & afebrile today  Repeat blood cx so far no growth   Treated  meropenem for listeria bacteremia and concerns of pneumonia - developed rash - thought to be drug rash - changed to Bactrim to complete 21 day antibiotic course  Anticipate a 3 wks course of antibiotics total & today is D # 11/2   hyponatremia Na 127 today. slow correction, f/u sergio  patient medically stable for discharge   - Repeat echo confirms normal LVEF (no wall motion abnormalities) with a hyperdynamic LV creating moderate LVOT obstruction from MANSOOR.   - Recommend beta blockers, avoiding dehydration, avoiding afterload reducers - would d/c lisinopril and increase Toprol XL to 25mg daily

## 2020-10-19 NOTE — DISCHARGE NOTE PROVIDER - PROVIDER TOKENS
PROVIDER:[TOKEN:[[id number]],FOLLOWUP:[1 week]] PROVIDER:[TOKEN:[[id number]],FOLLOWUP:[1 week]],PROVIDER:[TOKEN:[[id number]]]

## 2020-10-19 NOTE — CHART NOTE - NSCHARTNOTEFT_GEN_A_CORE
pt seen as per nurse request. Pt daughter requesting RD to discuss diet with pt. RD provided diet education during previous assessment. pt denied need for diet ed reinforcement. pt confirms that she still has the written handout provided.

## 2020-10-19 NOTE — PROGRESS NOTE ADULT - ASSESSMENT
70 yo female with RA, DM, HTN, fibromyalgia, immune suppressed at home on Enbrel and 5 mg of prednisone bid  and remote repair of cerebral aneurysm now admitted with fever, weakness and hypoxia.  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  Required vent & pressors support in ICU for shock - shock resolved.   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  She recalls a rash to PCN, therefor is on meropenem which she appears to be tolerating  RVP is negative, MRSA screen positive.  TTE with mild AS, no vegetations.  Repeat 10/9 blood cultures are NG to date  She had developed pancytopenia, now improved.  S/p RUE PICC placement   Transient fever of 100.4 F on 10/14 - was pancultured  CTA chest neg for PE, CTH no acute event  Urine cx no growth   Recurrent fever of 100.2F on 10/17/20   Flow sheets reviewed and she has been afebrile since 10/17   clinically she is non toxic appearing, she is eager to go home   Repeat blood cx so far no growth   WBC is normal  Creatine is stable     Suggest:  Continue meropenem for listeria bacteremia and concerns of pneumonia  D # 12 of 21 continue Merrem  2 g IV q8 as planned per last ID noted to complete a total of three weeks   OK to give evening dose of Merrem at  8pm  She will also need weeks labs CBC and CMP while she is on antibiotics   The patient will need to follow up in our office in one week   reviewed and discussed treatement plan with medicine NP on the floor, No ID objection from an ID point of view  for DC planning home

## 2020-10-19 NOTE — DISCHARGE NOTE PROVIDER - CARE PROVIDER_API CALL
Kel Mclauglhin)  Infectious Disease; Internal Medicine  [address] [place] [address]  Phone: [phone]  Fax: [phone]  Follow Up Time: 1 week   Kel Mclaughlin)  Infectious Disease; Internal Medicine  [address] [facility] [address]  [place] [address]  Phone: [phone]  Fax: [phone]  Follow Up Time: 1 week    Jhonny Daniel S  INTERNAL MEDICINE  [address] [place] [address]  Phone: [phone]  Fax: [phone]  Follow Up Time:

## 2020-10-19 NOTE — PROGRESS NOTE ADULT - SUBJECTIVE AND OBJECTIVE BOX
Patient is a 70y old  Female who presents with a chief complaint of SOB (09 Oct 2020 12:09)      SUBJECTIVE / OVERNIGHT EVENTS:  No new events noted today  BP is variable    Review of Systems:   MEDICATIONS  (STANDING):  artificial  tears Solution 1 Drop(s) Both EYES two times a day  cycloSPORINE (RESTASIS) 0.05% Emulsion 1 Drop(s) Both EYES two times a day  dextrose 5%. 1000 milliLiter(s) (50 mL/Hr) IV Continuous <Continuous>  dextrose 50% Injectable 12.5 Gram(s) IV Push once  dextrose 50% Injectable 25 Gram(s) IV Push once  dextrose 50% Injectable 25 Gram(s) IV Push once  insulin lispro (HumaLOG) corrective regimen sliding scale   SubCutaneous every 6 hours  levothyroxine Injectable 50 MICROGram(s) IV Push at bedtime  lisinopril 20 milliGRAM(s) Oral daily  meropenem  IVPB 2000 milliGRAM(s) IV Intermittent every 8 hours  methylPREDNISolone sodium succinate Injectable 8 milliGRAM(s) IV Push daily  potassium phosphate / sodium phosphate Powder (PHOS-NaK) 1 Packet(s) Oral two times a day before meals  prednisoLONE acetate 1% Suspension 1 Drop(s) Both EYES two times a day    MEDICATIONS  (PRN):  dextrose 40% Gel 15 Gram(s) Oral once PRN Blood Glucose LESS THAN 70 milliGRAM(s)/deciliter  glucagon  Injectable 1 milliGRAM(s) IntraMuscular once PRN Glucose LESS THAN 70 milligrams/deciliter  hydrALAZINE Injectable 5 milliGRAM(s) IV Push every 6 hours PRN SBP >170      PHYSICAL EXAM:  Vital Signs Last 24 Hrs  T(C): 36.6 (09 Oct 2020 20:00), Max: 37 (09 Oct 2020 04:00)  T(F): 97.9 (09 Oct 2020 20:00), Max: 98.6 (09 Oct 2020 04:00)  HR: 88 (09 Oct 2020 22:00) (69 - 124)  BP: 156/71 (09 Oct 2020 22:00) (116/58 - 210/99)  BP(mean): 102 (09 Oct 2020 22:00) (79 - 142)  RR: 16 (09 Oct 2020 22:00) (15 - 46)  SpO2: 98% (09 Oct 2020 22:00) (93% - 100%)  I&O's Summary    08 Oct 2020 07:01  -  09 Oct 2020 07:00  --------------------------------------------------------  IN: 491.4 mL / OUT: 640 mL / NET: -148.6 mL    09 Oct 2020 07:01  -  09 Oct 2020 22:55  --------------------------------------------------------  IN: 2670 mL / OUT: 300 mL / NET: 2370 mL      GENERAL:On a vent  HEAD:  Atraumatic, Normocephalic  EYES: EOMI, PERRLA, conjunctiva and sclera clear  NECK: Supple, No JVD  CHEST/LUNG: Clear to auscultation bilaterally; No wheeze  HEART: Regular rate and rhythm; No murmurs, rubs, or gallops  ABDOMEN: Soft, Nontender, Nondistended; Bowel sounds present  EXTREMITIES:  2+ Peripheral Pulses, No clubbing, cyanosis, or edema  PSYCH: AAOx3  NEUROLOGY: non-focal. Gen weakness, bed bound. Power in all extremities is   SKIN: No rashes or lesions/    LABS:  CAPILLARY BLOOD GLUCOSE      POCT Blood Glucose.: 239 mg/dL (09 Oct 2020 18:44)  POCT Blood Glucose.: 104 mg/dL (09 Oct 2020 05:11)  POCT Blood Glucose.: 151 mg/dL (08 Oct 2020 23:13)                          8.1    5.73  )-----------( 140      ( 09 Oct 2020 12:44 )             25.6     10-09    131<L>  |  101  |  10  ----------------------------<  96  3.6   |  16<L>  |  <0.30<L>    Ca    8.8      09 Oct 2020 12:44  Phos  2.1     10-09  Mg     1.6     10-09    TPro  6.0  /  Alb  2.9<L>  /  TBili  0.4  /  DBili  x   /  AST  38  /  ALT  57<H>  /  AlkPhos  41  10-09    PT/INR - ( 09 Oct 2020 00:21 )   PT: 13.6 sec;   INR: 1.14 ratio         PTT - ( 09 Oct 2020 00:21 )  PTT:30.2 sec          RADIOLOGY & ADDITIONAL TESTS:    Imaging Personally Reviewed:    Consultant(s) Notes Reviewed:      Care Discussed with Consultants/Other Providers:

## 2020-10-19 NOTE — PROGRESS NOTE ADULT - SUBJECTIVE AND OBJECTIVE BOX
S: Denies chest pain or SOB. Review of systems otherwise (-)      MEDICATIONS  (STANDING):  artificial  tears Solution 1 Drop(s) Both EYES two times a day  chlorhexidine 2% Cloths 1 Application(s) Topical daily  cycloSPORINE (RESTASIS) 0.05% Emulsion 1 Drop(s) Both EYES two times a day  dextrose 5%. 1000 milliLiter(s) (50 mL/Hr) IV Continuous <Continuous>  dextrose 50% Injectable 12.5 Gram(s) IV Push once  dextrose 50% Injectable 25 Gram(s) IV Push once  dextrose 50% Injectable 25 Gram(s) IV Push once  fondaparinux Injectable 2.5 milliGRAM(s) SubCutaneous daily  insulin lispro (HumaLOG) corrective regimen sliding scale   SubCutaneous three times a day before meals  insulin lispro (HumaLOG) corrective regimen sliding scale   SubCutaneous at bedtime  levothyroxine Injectable 75 MICROGram(s) IV Push at bedtime  meropenem  IVPB 2000 milliGRAM(s) IV Intermittent every 8 hours  metoprolol succinate ER 12.5 milliGRAM(s) Oral daily  polyethylene glycol 3350 17 Gram(s) Oral daily  prednisoLONE acetate 1% Suspension 1 Drop(s) Both EYES two times a day  predniSONE   Tablet 5 milliGRAM(s) Oral daily  senna 2 Tablet(s) Oral at bedtime  sodium chloride 1 Gram(s) Oral once    MEDICATIONS  (PRN):  dextrose 40% Gel 15 Gram(s) Oral once PRN Blood Glucose LESS THAN 70 milliGRAM(s)/deciliter  glucagon  Injectable 1 milliGRAM(s) IntraMuscular once PRN Glucose LESS THAN 70 milligrams/deciliter  hydrALAZINE Injectable 5 milliGRAM(s) IV Push every 6 hours PRN SBP >170  simethicone 80 milliGRAM(s) Chew three times a day PRN Gas  traMADol 50 milliGRAM(s) Oral two times a day PRN Moderate Pain (4 - 6)/Moderate Pain (4 - 6)      LABS:                            8.3    5.29  )-----------( 125      ( 19 Oct 2020 05:05 )             27.9     Hemoglobin: 8.3 g/dL (10-19 @ 05:05)  Hemoglobin: 8.7 g/dL (10-16 @ 06:13)  Hemoglobin: 8.4 g/dL (10-15 @ 04:24)    10-19    127<L>  |  97  |  8   ----------------------------<  77  3.6   |  22  |  <0.30<L>    Ca    8.1<L>      19 Oct 2020 05:05      Creatinine Trend: <0.30<--, <0.30<--, <0.30<--, <0.30<--, <0.30<--, <0.30<--             10-18-20 @ 07:01  -  10-19-20 @ 07:00  --------------------------------------------------------  IN: 1120 mL / OUT: 700 mL / NET: 420 mL    10-19-20 @ 07:01  -  10-19-20 @ 16:02  --------------------------------------------------------  IN: 120 mL / OUT: 0 mL / NET: 120 mL        PHYSICAL EXAM  Vital Signs Last 24 Hrs  T(C): 36.9 (19 Oct 2020 11:59), Max: 37.4 (19 Oct 2020 07:54)  T(F): 98.5 (19 Oct 2020 11:59), Max: 99.4 (19 Oct 2020 07:54)  HR: 90 (19 Oct 2020 11:59) (89 - 108)  BP: 95/58 (19 Oct 2020 11:59) (95/58 - 175/76)  BP(mean): --  RR: 18 (19 Oct 2020 11:59) (18 - 18)  SpO2: 98% (19 Oct 2020 11:59) (95% - 99%)      Gen: Appears well in NAD  HEENT:  (-)icterus (-)pallor  CV: N S1 S2 1/6 JUAN (+)2 Pulses B/l  Resp:  Clear to auscultation B/L, normal effort  GI: (+) BS Soft, NT, ND  Lymph:  (-)Edema, (-)obvious lymphadenopathy  Skin: Warm to touch, Normal turgor  Psych: Appropriate mood and affect      TELEMETRY: SR/ST 70-100s	      ECG:  Sinus 86 BPM, LVH nonspecific T wave abnormalities     Echo:< from: Transthoracic Echocardiogram (10.09.20 @ 10:59) >  1. Calcified trileaflet aortic valve with decreased  opening. Peak transaortic valve gradient equals 16 mm Hg,  mean transaortic valve gradient equals 8 mm Hg, estimated  aortic valve area equals 1.6 sqcm (by continuity equation),  aortic valve velocity time integral equals 28 cm,  consistent with mild aortic stenosis.  2. Increased relative wall thickness with normal left  ventricular mass index, consistent with concentric left  ventricular remodeling.  3. Normal left ventricular systolic function. No segmental  wall motion abnormalities.  4. Normal right ventricular size and function.  *** No previous Echo exam.  Unable to rule out endocarditis, consider JAIME if clinicaly  indicated.  ------------------------------------------------------------------------  Confirmed on  10/9/2020 - 13:48:44 by FOREST Lawrence  ------------------------------------------------------------------------    < end of copied text >    ECHO: ho< from: Limited Transthoracic Echo (w/3D) (10.17.20 @ 14:37) >  CONCLUSIONS:  1. Systolic anterior motion of the mitral valve leaflet.  2. Small left ventricle.  3. Hyperdynamic left ventricular systolic function.   Peak left ventricular outflow tract gradient equals 67 mm  Hg, mean gradient is equal to 19 mm Hg, LVOT velocity time  integral equals 66 cm, consistent with moderately severe  LVOT obstruction.  ------------------------------------------------------------------------  Confirmed on  10/17/2020 - 16:30:18 by Brad Lee M.D.  [facility]  ------------------------------------------------------------------------    < end of copied text >      ASSESSMENT/PLAN: 70y Female  history of RA, DM, HTN, fibromyalgia, hypothyroidism, brain aneurysm s/p repair BIBEMS for shortness of breath and fever PNA with an episode of hypotension and (+) Trop.    - Repeat echo confirms normal LVEF (no wall motion abnormalities) with a hyperdynamic LV creating moderate LVOT obstruction from MANSOOR.   - Recommend beta blockers, avoiding dehydration, avoiding afterload reducers - would d/c lisinopril and increase Toprol XL to 25mg daily  - Recommend outpatient NST  - No further inpatient cardiac w/u needed  - Patient to f/u with her own cardiologist Dr. Woods after d/c    KO De LunaC  Pager: [phone]

## 2020-10-20 LAB
ANION GAP SERPL CALC-SCNC: 8 MMOL/L — SIGNIFICANT CHANGE UP (ref 5–17)
BUN SERPL-MCNC: 8 MG/DL — SIGNIFICANT CHANGE UP (ref 7–23)
CALCIUM SERPL-MCNC: 8 MG/DL — LOW (ref 8.4–10.5)
CHLORIDE SERPL-SCNC: 99 MMOL/L — SIGNIFICANT CHANGE UP (ref 96–108)
CO2 SERPL-SCNC: 23 MMOL/L — SIGNIFICANT CHANGE UP (ref 22–31)
CREAT SERPL-MCNC: <0.3 MG/DL — LOW (ref 0.5–1.3)
GLUCOSE BLDC GLUCOMTR-MCNC: 113 MG/DL — HIGH (ref 70–99)
GLUCOSE BLDC GLUCOMTR-MCNC: 147 MG/DL — HIGH (ref 70–99)
GLUCOSE BLDC GLUCOMTR-MCNC: 158 MG/DL — HIGH (ref 70–99)
GLUCOSE BLDC GLUCOMTR-MCNC: 206 MG/DL — HIGH (ref 70–99)
GLUCOSE SERPL-MCNC: 190 MG/DL — HIGH (ref 70–99)
HCT VFR BLD CALC: 24.6 % — LOW (ref 34.5–45)
HGB BLD-MCNC: 7.4 G/DL — LOW (ref 11.5–15.5)
MCHC RBC-ENTMCNC: 24.7 PG — LOW (ref 27–34)
MCHC RBC-ENTMCNC: 30.1 GM/DL — LOW (ref 32–36)
MCV RBC AUTO: 82.3 FL — SIGNIFICANT CHANGE UP (ref 80–100)
NRBC # BLD: 0 /100 WBCS — SIGNIFICANT CHANGE UP (ref 0–0)
PLATELET # BLD AUTO: 113 K/UL — LOW (ref 150–400)
POTASSIUM SERPL-MCNC: 4.1 MMOL/L — SIGNIFICANT CHANGE UP (ref 3.5–5.3)
POTASSIUM SERPL-SCNC: 4.1 MMOL/L — SIGNIFICANT CHANGE UP (ref 3.5–5.3)
RBC # BLD: 2.99 M/UL — LOW (ref 3.8–5.2)
RBC # FLD: 16.7 % — HIGH (ref 10.3–14.5)
SODIUM SERPL-SCNC: 130 MMOL/L — LOW (ref 135–145)
WBC # BLD: 4.09 K/UL — SIGNIFICANT CHANGE UP (ref 3.8–10.5)
WBC # FLD AUTO: 4.09 K/UL — SIGNIFICANT CHANGE UP (ref 3.8–10.5)

## 2020-10-20 PROCEDURE — 71045 X-RAY EXAM CHEST 1 VIEW: CPT | Mod: 26

## 2020-10-20 RX ORDER — ACETAMINOPHEN 500 MG
650 TABLET ORAL EVERY 6 HOURS
Refills: 0 | Status: DISCONTINUED | OUTPATIENT
Start: 2020-10-20 | End: 2020-10-23

## 2020-10-20 RX ORDER — INSULIN LISPRO 100/ML
VIAL (ML) SUBCUTANEOUS
Refills: 0 | Status: DISCONTINUED | OUTPATIENT
Start: 2020-10-20 | End: 2020-10-23

## 2020-10-20 RX ORDER — INSULIN LISPRO 100/ML
VIAL (ML) SUBCUTANEOUS AT BEDTIME
Refills: 0 | Status: DISCONTINUED | OUTPATIENT
Start: 2020-10-20 | End: 2020-10-23

## 2020-10-20 RX ADMIN — Medication 265 MILLIGRAM(S): at 21:15

## 2020-10-20 RX ADMIN — Medication 1 DROP(S): at 16:42

## 2020-10-20 RX ADMIN — Medication 5 MILLIGRAM(S): at 05:47

## 2020-10-20 RX ADMIN — Medication 650 MILLIGRAM(S): at 07:13

## 2020-10-20 RX ADMIN — POLYETHYLENE GLYCOL 3350 17 GRAM(S): 17 POWDER, FOR SOLUTION ORAL at 09:42

## 2020-10-20 RX ADMIN — Medication 1 DROP(S): at 16:41

## 2020-10-20 RX ADMIN — Medication 1 DROP(S): at 05:47

## 2020-10-20 RX ADMIN — CYCLOSPORINE 1 DROP(S): 0.5 EMULSION OPHTHALMIC at 05:46

## 2020-10-20 RX ADMIN — Medication 75 MICROGRAM(S): at 21:18

## 2020-10-20 RX ADMIN — MEROPENEM 200 MILLIGRAM(S): 1 INJECTION INTRAVENOUS at 06:07

## 2020-10-20 RX ADMIN — Medication 650 MILLIGRAM(S): at 13:30

## 2020-10-20 RX ADMIN — Medication 650 MILLIGRAM(S): at 06:32

## 2020-10-20 RX ADMIN — Medication 650 MILLIGRAM(S): at 12:38

## 2020-10-20 RX ADMIN — Medication 265 MILLIGRAM(S): at 13:44

## 2020-10-20 RX ADMIN — Medication 25 MILLIGRAM(S): at 05:47

## 2020-10-20 RX ADMIN — CYCLOSPORINE 1 DROP(S): 0.5 EMULSION OPHTHALMIC at 16:43

## 2020-10-20 RX ADMIN — SENNA PLUS 2 TABLET(S): 8.6 TABLET ORAL at 21:15

## 2020-10-20 RX ADMIN — FONDAPARINUX SODIUM 2.5 MILLIGRAM(S): 2.5 INJECTION, SOLUTION SUBCUTANEOUS at 09:42

## 2020-10-20 RX ADMIN — Medication 2: at 13:36

## 2020-10-20 RX ADMIN — CHLORHEXIDINE GLUCONATE 1 APPLICATION(S): 213 SOLUTION TOPICAL at 09:42

## 2020-10-20 RX ADMIN — Medication 1 DROP(S): at 05:45

## 2020-10-20 NOTE — PROGRESS NOTE ADULT - SUBJECTIVE AND OBJECTIVE BOX
CC: f/u for Listeria bacteremia    Patient was noted this morning to have a new rash on her arms, she reported some itching to the nurse     REVIEW OF SYSTEMS:  All other review of systems negative (Comprehensive ROS)    Antimicrobials Day #  :  meropenem  IVPB 2000 milliGRAM(s) IV Intermittent every 8 hours    Vital Signs Last 24 Hrs  T(C): 36.9 (20 Oct 2020 04:53), Max: 36.9 (19 Oct 2020 11:59)  T(F): 98.4 (20 Oct 2020 04:53), Max: 98.5 (19 Oct 2020 11:59)  HR: 93 (20 Oct 2020 04:53) (78 - 96)  BP: 106/78 (20 Oct 2020 04:53) (95/58 - 127/55)  BP(mean): --  RR: 18 (20 Oct 2020 04:53) (18 - 18)  SpO2: 99% (20 Oct 2020 04:53) (98% - 99%)    PHYSICAL EXAM:  General: alert, no acute distress  Eyes:  anicteric, no conjunctival injection, no discharge  Neck: supple, without adenopathy  Lungs: clear to auscultation  Heart: regular rate and rhythm; no murmurs  Abdomen: soft, nondistended, nontender.  Skin: macular rash on her arms, chest, legs , abdomen   Extremities: no clubbing or cyanosis. + edema                    RUE PICC   Neurologic: alert & oriented.      LAB RESULTS:                            8.3    5.29  )-----------( 125      ( 19 Oct 2020 05:05 )             27.9     10-19    127<L>  |  97  |  8   ----------------------------<  77  3.6   |  22  |  <0.30<L>    Ca    8.1<L>      19 Oct 2020 05:05          Urinalysis Basic - ( 17 Oct 2020 22:39 )    Color: Colorless / Appearance: Clear / S.007 / pH: x  Gluc: x / Ketone: Small  / Bili: Negative / Urobili: Negative   Blood: x / Protein: 30 mg/dL / Nitrite: Negative   Leuk Esterase: Negative / RBC: 1 /hpf / WBC 0 /HPF   Sq Epi: x / Non Sq Epi: 0 /hpf / Bacteria: Negative      MICROBIOLOGY:  RECENT CULTURES:  10-14 @ 18:50 .Urine Catheterized     No growth      10-14 @ 17:44 .Blood Blood     No growth to date.        RADIOLOGY REVIEWED:

## 2020-10-20 NOTE — PROGRESS NOTE ADULT - ASSESSMENT
ASSESSMENT/PLAN:  This is a 70/F w/ RA, DM, HTN, fibromyalgia, hypothyroidism, brain aneurysm s/p repair BIBEMS for AMS.  Found in respiratory distress likely 2/2 multifocal PNA, intubated for hypoxemic respiratory failure.  Extubated in MICU w/o complication.  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  SHe is a vegetarian and gets her protein intake from nonanimal meats.  This could also be from hypotension that will lead to pre-renal azotemia(and decrease in ability to excrete free water)  Urine indices are not c/w SIADH     1 Rheum-At present no need for stress steroids   2 Renal- Increase Glucerna to tid and no salt restriction; The abx has a large salt load which will help;   SP [id number] po x 1 yesterday and awaiting labs from today   3 ID- On IV abx   4 CVS-Off the  lisinopril as the BP is soft     Sayed Yoselin   [facility]  [phone]

## 2020-10-20 NOTE — PROGRESS NOTE ADULT - SUBJECTIVE AND OBJECTIVE BOX
Neurology Progress Note    S: Patient seen and examined. new macular rash on chest and LUE. drug reaction?     Medication:  artificial  tears Solution 1 Drop(s) Both EYES two times a day  chlorhexidine 2% Cloths 1 Application(s) Topical daily  cycloSPORINE (RESTASIS) 0.05% Emulsion 1 Drop(s) Both EYES two times a day  dextrose 40% Gel 15 Gram(s) Oral once PRN  dextrose 5%. 1000 milliLiter(s) IV Continuous <Continuous>  dextrose 50% Injectable 12.5 Gram(s) IV Push once  dextrose 50% Injectable 25 Gram(s) IV Push once  dextrose 50% Injectable 25 Gram(s) IV Push once  fondaparinux Injectable 2.5 milliGRAM(s) SubCutaneous daily  glucagon  Injectable 1 milliGRAM(s) IntraMuscular once PRN  hydrALAZINE Injectable 5 milliGRAM(s) IV Push every 6 hours PRN  insulin lispro (HumaLOG) corrective regimen sliding scale   SubCutaneous three times a day before meals  insulin lispro (HumaLOG) corrective regimen sliding scale   SubCutaneous at bedtime  levothyroxine Injectable 75 MICROGram(s) IV Push at bedtime  lisinopril 10 milliGRAM(s) Oral daily  meropenem  IVPB 2000 milliGRAM(s) IV Intermittent every 8 hours  polyethylene glycol 3350 17 Gram(s) Oral daily  prednisoLONE acetate 1% Suspension 1 Drop(s) Both EYES two times a day  predniSONE   Tablet 5 milliGRAM(s) Oral daily  senna 2 Tablet(s) Oral at bedtime  simethicone 80 milliGRAM(s) Chew three times a day PRN      Vitals:  Vital Signs Last 24 Hrs  T(C): 36.8 (16 Oct 2020 05:01), Max: 37.1 (15 Oct 2020 20:54)  T(F): 98.3 (16 Oct 2020 05:01), Max: 98.7 (15 Oct 2020 20:54)  HR: 102 (16 Oct 2020 05:01) (70 - 102)  BP: 149/84 (16 Oct 2020 05:21) (100/64 - 149/84)  BP(mean): --  RR: 18 (16 Oct 2020 05:21) (18 - 18)  SpO2: 98% (16 Oct 2020 05:21) (96% - 98%)    General Exam:   General Appearance: Appropriately dressed and in no acute distress       Head: Normocephalic, atraumatic and no dysmorphic features  Ear, Nose, and Throat: Moist mucous membranes  CVS: S1S2+  Resp: mild SOB but no wheeze  GI: soft NT/ND  Extremeties: mild LE edema no  cyanosis  Skin: some bruises from IV, LUE rash and macular rash on chest      Neurological Exam:  Mental Status: Awake, alert and oriented x 3.  Able to follow simple and complex verbal commands. Able to name and repeat. fluent speech. No obvious aphasia or dysarthria noted.   Cranial Nerves: PERRL, EOMI, VFFC, sensation V1-V3 intact,  no obvious facial assymetry, equal elevation of palate, scm/trap 5/5, tongue is midline on protrusion. no obvious papilledema on fundoscopic exam. hearing is grossly intact.   Motor: GAMA but proximally limited motion 1/5 in UE and LE proximally, but distally x 4 she is 4/5 (baseline?)   Sensation: Intact to light touch and pinprick throughout. no right/left confusion.   Reflexes: 1+ throughout at biceps, brachioradialis, triceps, patellars and ankles bilaterally and equal. No clonus. R toe and L toe were both downgoing.  Coordination: unable   Gait: wheelchair bound    I personally reviewed the below data/images/labs:      artificial  tears Solution 1 Drop(s) Both EYES two times a day  chlorhexidine 2% Cloths 1 Application(s) Topical daily  cycloSPORINE (RESTASIS) 0.05% Emulsion 1 Drop(s) Both EYES two times a day  dextrose 40% Gel 15 Gram(s) Oral once PRN  dextrose 5%. 1000 milliLiter(s) IV Continuous <Continuous>  dextrose 50% Injectable 12.5 Gram(s) IV Push once  dextrose 50% Injectable 25 Gram(s) IV Push once  dextrose 50% Injectable 25 Gram(s) IV Push once  fondaparinux Injectable 2.5 milliGRAM(s) SubCutaneous daily  glucagon  Injectable 1 milliGRAM(s) IntraMuscular once PRN  hydrALAZINE Injectable 5 milliGRAM(s) IV Push every 6 hours PRN  insulin lispro (HumaLOG) corrective regimen sliding scale   SubCutaneous three times a day before meals  insulin lispro (HumaLOG) corrective regimen sliding scale   SubCutaneous at bedtime  levothyroxine Injectable 75 MICROGram(s) IV Push at bedtime  lisinopril 10 milliGRAM(s) Oral daily  meropenem  IVPB 2000 milliGRAM(s) IV Intermittent every 8 hours  polyethylene glycol 3350 17 Gram(s) Oral daily  prednisoLONE acetate 1% Suspension 1 Drop(s) Both EYES two times a day  predniSONE   Tablet 5 milliGRAM(s) Oral daily  senna 2 Tablet(s) Oral at bedtime  simethicone 80 milliGRAM(s) Chew three times a day PRN    CBC Full  -  ( 16 Oct 2020 06:13 )  WBC Count : 6.96 K/uL  RBC Count : 3.55 M/uL  Hemoglobin : 8.7 g/dL  Hematocrit : 29.1 %  Platelet Count - Automated : 171 K/uL  Mean Cell Volume : 82.0 fl  Mean Cell Hemoglobin : 24.5 pg  Mean Cell Hemoglobin Concentration : 29.9 gm/dL  Auto Neutrophil # : x  Auto Lymphocyte # : x  Auto Monocyte # : x  Auto Eosinophil # : x  Auto Basophil # : x  Auto Neutrophil % : x  Auto Lymphocyte % : x  Auto Monocyte % : x  Auto Eosinophil % : x  Auto Basophil % : x    10-16    130<L>  |  97  |  8   ----------------------------<  124<H>  4.3   |  22  |  <0.30<L>    Ca    8.5      16 Oct 2020 06:13          Urinalysis Basic - ( 14 Oct 2020 15:24 )    Color: Light Yellow / Appearance: Clear / S.013 / pH: x  Gluc: x / Ketone: Trace  / Bili: Negative / Urobili: Negative   Blood: x / Protein: Trace / Nitrite: Negative   Leuk Esterase: Negative / RBC: 3 /hpf / WBC 1 /HPF   Sq Epi: x / Non Sq Epi: 2 /hpf / Bacteria: Negative            CTA chest: < from: CT Angio Chest w/ IV Cont (10.14.20 @ 21:30) >  FINDINGS:    LUNGS AND AIRWAYS: Low lung volumes. Elevated diaphragm. Interval improvement in bilateral posterior and dependent consolidations previously seen on chest CT from 10/7/2020.  PLEURA: Bilateral small left and trace right pleural effusions.  MEDIASTINUM AND ROSEMARY: No lymphadenopathy.  VESSELS: The main pulmonary artery measures 2.1 cm. There is no main, central, lobar, or segmental pulmonary embolus. Right upper extremity PICC with tip terminating in the right atrium. Calcified and noncalcified ulcerative plaque involving the aorta.  HEART: Heart size is normal. No pericardial effusion. Coronary arterial calcifications. No signs of right heart strain.  CHEST WALL AND LOWER NECK: Within normal limits.  VISUALIZED UPPER ABDOMEN: Embolization coil mass in the gastrohepatic space.  BONES: Degenerative changes.    IMPRESSION:    No main, lobar, or segmental pulmonary embolism. Limited evaluation the subsegmental pulmonary arteries due to respiratory motion.    Improvement in bilateral atelectasis seen on chest CT from 10/7/2020. Small left and trace right pleural effusions.      CTH 10/14/20  IMPRESSION: No acute intracranial hemorrhage, mass effect, or shift of the midline structures.    Mild interval advancement of chronic white matter microvascular type changes.    TTE: 1. Calcified trileaflet aortic valve with decreased  opening. Peak transaortic valve gradient equals 16 mm Hg,  mean transaortic valve gradient equals 8 mm Hg, estimated  aortic valve area equals 1.6 sqcm (by continuity equation),  aortic valve velocity time integral equals 28 cm,  consistent with mild aortic stenosis.  2. Increased relative wall thickness with normal left  ventricular mass index, consistent with concentric left  ventricular remodeling.  3. Normal left ventricular systolic function. No segmental  wall motion abnormalities.  4. Normal right ventricular size and function.  *** No previous Echo exam.  Unable to rule out endocarditis, consider JAIME if clinicaly  indicated.  Hgb A1c 7.3

## 2020-10-20 NOTE — PROGRESS NOTE ADULT - SUBJECTIVE AND OBJECTIVE BOX
Patient is a 70y old  Female who presents with a chief complaint of SOB (09 Oct 2020 12:09)      SUBJECTIVE / OVERNIGHT EVENTS:  Macular generalized rash noted    Review of Systems:   MEDICATIONS  (STANDING):  artificial  tears Solution 1 Drop(s) Both EYES two times a day  cycloSPORINE (RESTASIS) 0.05% Emulsion 1 Drop(s) Both EYES two times a day  dextrose 5%. 1000 milliLiter(s) (50 mL/Hr) IV Continuous <Continuous>  dextrose 50% Injectable 12.5 Gram(s) IV Push once  dextrose 50% Injectable 25 Gram(s) IV Push once  dextrose 50% Injectable 25 Gram(s) IV Push once  insulin lispro (HumaLOG) corrective regimen sliding scale   SubCutaneous every 6 hours  levothyroxine Injectable 50 MICROGram(s) IV Push at bedtime  lisinopril 20 milliGRAM(s) Oral daily  meropenem  IVPB 2000 milliGRAM(s) IV Intermittent every 8 hours  methylPREDNISolone sodium succinate Injectable 8 milliGRAM(s) IV Push daily  potassium phosphate / sodium phosphate Powder (PHOS-NaK) 1 Packet(s) Oral two times a day before meals  prednisoLONE acetate 1% Suspension 1 Drop(s) Both EYES two times a day    MEDICATIONS  (PRN):  dextrose 40% Gel 15 Gram(s) Oral once PRN Blood Glucose LESS THAN 70 milliGRAM(s)/deciliter  glucagon  Injectable 1 milliGRAM(s) IntraMuscular once PRN Glucose LESS THAN 70 milligrams/deciliter  hydrALAZINE Injectable 5 milliGRAM(s) IV Push every 6 hours PRN SBP >170      PHYSICAL EXAM:  Vital Signs Last 24 Hrs  T(C): 36.6 (09 Oct 2020 20:00), Max: 37 (09 Oct 2020 04:00)  T(F): 97.9 (09 Oct 2020 20:00), Max: 98.6 (09 Oct 2020 04:00)  HR: 88 (09 Oct 2020 22:00) (69 - 124)  BP: 156/71 (09 Oct 2020 22:00) (116/58 - 210/99)  BP(mean): 102 (09 Oct 2020 22:00) (79 - 142)  RR: 16 (09 Oct 2020 22:00) (15 - 46)  SpO2: 98% (09 Oct 2020 22:00) (93% - 100%)  I&O's Summary    08 Oct 2020 07:01  -  09 Oct 2020 07:00  --------------------------------------------------------  IN: 491.4 mL / OUT: 640 mL / NET: -148.6 mL    09 Oct 2020 07:01  -  09 Oct 2020 22:55  --------------------------------------------------------  IN: 2670 mL / OUT: 300 mL / NET: 2370 mL      GENERAL:On a vent  HEAD:  Atraumatic, Normocephalic  EYES: EOMI, PERRLA, conjunctiva and sclera clear  NECK: Supple, No JVD  CHEST/LUNG: Clear to auscultation bilaterally; No wheeze  HEART: Regular rate and rhythm; No murmurs, rubs, or gallops  ABDOMEN: Soft, Nontender, Nondistended; Bowel sounds present  EXTREMITIES:  2+ Peripheral Pulses, No clubbing, cyanosis, or edema  PSYCH: AAOx3  NEUROLOGY: non-focal. Gen weakness, bed bound. Power in all extremities is   SKIN:generalized rash    LABS:  CAPILLARY BLOOD GLUCOSE      POCT Blood Glucose.: 239 mg/dL (09 Oct 2020 18:44)  POCT Blood Glucose.: 104 mg/dL (09 Oct 2020 05:11)  POCT Blood Glucose.: 151 mg/dL (08 Oct 2020 23:13)                          8.1    5.73  )-----------( 140      ( 09 Oct 2020 12:44 )             25.6     10-09    131<L>  |  101  |  10  ----------------------------<  96  3.6   |  16<L>  |  <0.30<L>    Ca    8.8      09 Oct 2020 12:44  Phos  2.1     10-09  Mg     1.6     10-09    TPro  6.0  /  Alb  2.9<L>  /  TBili  0.4  /  DBili  x   /  AST  38  /  ALT  57<H>  /  AlkPhos  41  10-09    PT/INR - ( 09 Oct 2020 00:21 )   PT: 13.6 sec;   INR: 1.14 ratio         PTT - ( 09 Oct 2020 00:21 )  PTT:30.2 sec          RADIOLOGY & ADDITIONAL TESTS:    Imaging Personally Reviewed:    Consultant(s) Notes Reviewed:      Care Discussed with Consultants/Other Providers:

## 2020-10-20 NOTE — PROGRESS NOTE ADULT - SUBJECTIVE AND OBJECTIVE BOX
S: Denies chest pain or SOB. Review of systems otherwise (-)      MEDICATIONS  (STANDING):  artificial  tears Solution 1 Drop(s) Both EYES two times a day  chlorhexidine 2% Cloths 1 Application(s) Topical daily  cycloSPORINE (RESTASIS) 0.05% Emulsion 1 Drop(s) Both EYES two times a day  dextrose 5%. 1000 milliLiter(s) (50 mL/Hr) IV Continuous <Continuous>  dextrose 50% Injectable 12.5 Gram(s) IV Push once  dextrose 50% Injectable 25 Gram(s) IV Push once  dextrose 50% Injectable 25 Gram(s) IV Push once  fondaparinux Injectable 2.5 milliGRAM(s) SubCutaneous daily  insulin lispro (HumaLOG) corrective regimen sliding scale   SubCutaneous three times a day before meals  insulin lispro (HumaLOG) corrective regimen sliding scale   SubCutaneous at bedtime  levothyroxine Injectable 75 MICROGram(s) IV Push at bedtime  metoprolol succinate ER 25 milliGRAM(s) Oral daily  polyethylene glycol 3350 17 Gram(s) Oral daily  prednisoLONE acetate 1% Suspension 1 Drop(s) Both EYES two times a day  predniSONE   Tablet 5 milliGRAM(s) Oral daily  senna 2 Tablet(s) Oral at bedtime  trimethoprim / sulfamethoxazole IVPB 240 milliGRAM(s) IV Intermittent every 6 hours    MEDICATIONS  (PRN):  acetaminophen   Tablet .. 650 milliGRAM(s) Oral every 6 hours PRN Mild Pain (1 - 3)  dextrose 40% Gel 15 Gram(s) Oral once PRN Blood Glucose LESS THAN 70 milliGRAM(s)/deciliter  glucagon  Injectable 1 milliGRAM(s) IntraMuscular once PRN Glucose LESS THAN 70 milligrams/deciliter  hydrALAZINE Injectable 5 milliGRAM(s) IV Push every 6 hours PRN SBP >170  simethicone 80 milliGRAM(s) Chew three times a day PRN Gas  traMADol 50 milliGRAM(s) Oral two times a day PRN Moderate Pain (4 - 6)/Moderate Pain (4 - 6)      LABS:                            8.3    5.29  )-----------( 125      ( 19 Oct 2020 05:05 )             27.9     Hemoglobin: 8.3 g/dL (10-19 @ 05:05)  Hemoglobin: 8.7 g/dL (10-16 @ 06:13)    10-19    127<L>  |  97  |  8   ----------------------------<  77  3.6   |  22  |  <0.30<L>    Ca    8.1<L>      19 Oct 2020 05:05      Creatinine Trend: <0.30<--, <0.30<--, <0.30<--, <0.30<--, <0.30<--, <0.30<--             10-19-20 @ 07:01  -  10-20-20 @ 07:00  --------------------------------------------------------  IN: 1110 mL / OUT: 500 mL / NET: 610 mL        PHYSICAL EXAM  Vital Signs Last 24 Hrs  T(C): 36.9 (20 Oct 2020 04:53), Max: 36.9 (19 Oct 2020 11:59)  T(F): 98.4 (20 Oct 2020 04:53), Max: 98.5 (19 Oct 2020 11:59)  HR: 93 (20 Oct 2020 04:53) (78 - 93)  BP: 106/78 (20 Oct 2020 04:53) (95/58 - 127/55)  BP(mean): --  RR: 18 (20 Oct 2020 04:53) (18 - 18)  SpO2: 99% (20 Oct 2020 04:53) (98% - 99%)      Gen: Appears well in NAD  HEENT:  (-)icterus (-)pallor  CV: N S1 S2 1/6 JUAN (+)2 Pulses B/l  Resp:  Clear to auscultation B/L, normal effort  GI: (+) BS Soft, NT, ND  Lymph:  (-)Edema, (-)obvious lymphadenopathy  Skin: Warm to touch, Normal turgor  Psych: Appropriate mood and affect      TELEMETRY: SR 70-90	      ECG:  Sinus 86 BPM, LVH nonspecific T wave abnormalities     Echo:< from: Transthoracic Echocardiogram (10.09.20 @ 10:59) >  1. Calcified trileaflet aortic valve with decreased  opening. Peak transaortic valve gradient equals 16 mm Hg,  mean transaortic valve gradient equals 8 mm Hg, estimated  aortic valve area equals 1.6 sqcm (by continuity equation),  aortic valve velocity time integral equals 28 cm,  consistent with mild aortic stenosis.  2. Increased relative wall thickness with normal left  ventricular mass index, consistent with concentric left  ventricular remodeling.  3. Normal left ventricular systolic function. No segmental  wall motion abnormalities.  4. Normal right ventricular size and function.  *** No previous Echo exam.  Unable to rule out endocarditis, consider JAIME if clinicaly  indicated.  ------------------------------------------------------------------------  Confirmed on  10/9/2020 - 13:48:44 by FOREST Lawrence  ------------------------------------------------------------------------    < end of copied text >    ECHO: rachid< from: Limited Transthoracic Echo (w/3D) (10.17.20 @ 14:37) >  CONCLUSIONS:  1. Systolic anterior motion of the mitral valve leaflet.  2. Small left ventricle.  3. Hyperdynamic left ventricular systolic function.   Peak left ventricular outflow tract gradient equals 67 mm  Hg, mean gradient is equal to 19 mm Hg, LVOT velocity time  integral equals 66 cm, consistent with moderately severe  LVOT obstruction.  ------------------------------------------------------------------------  Confirmed on  10/17/2020 - 16:30:18 by FOREST Lawrence  ------------------------------------------------------------------------    < end of copied text >      ASSESSMENT/PLAN: 70y Female  history of RA, DM, HTN, fibromyalgia, hypothyroidism, brain aneurysm s/p repair BIBEMS for shortness of breath and fever PNA with an episode of hypotension and (+) Trop.    - Repeat echo confirms normal LVEF (no wall motion abnormalities) with a hyperdynamic LV creating moderate LVOT obstruction from MANSOOR.   - Recommend beta blockers, avoiding dehydration, avoiding afterload reducers - continue Toprol XL to 25mg daily, lisinopril d/marli  - Recommend outpatient NST  - No further inpatient cardiac w/u needed  - Patient to f/u with her own cardiologist Dr. Woods after d/c    Lee Encinas PA-C  Pager: [phone]            S: Denies chest pain or SOB. Review of systems otherwise (-)      MEDICATIONS  (STANDING):  artificial  tears Solution 1 Drop(s) Both EYES two times a day  chlorhexidine 2% Cloths 1 Application(s) Topical daily  cycloSPORINE (RESTASIS) 0.05% Emulsion 1 Drop(s) Both EYES two times a day  dextrose 5%. 1000 milliLiter(s) (50 mL/Hr) IV Continuous <Continuous>  dextrose 50% Injectable 12.5 Gram(s) IV Push once  dextrose 50% Injectable 25 Gram(s) IV Push once  dextrose 50% Injectable 25 Gram(s) IV Push once  fondaparinux Injectable 2.5 milliGRAM(s) SubCutaneous daily  insulin lispro (HumaLOG) corrective regimen sliding scale   SubCutaneous three times a day before meals  insulin lispro (HumaLOG) corrective regimen sliding scale   SubCutaneous at bedtime  levothyroxine Injectable 75 MICROGram(s) IV Push at bedtime  metoprolol succinate ER 25 milliGRAM(s) Oral daily  polyethylene glycol 3350 17 Gram(s) Oral daily  prednisoLONE acetate 1% Suspension 1 Drop(s) Both EYES two times a day  predniSONE   Tablet 5 milliGRAM(s) Oral daily  senna 2 Tablet(s) Oral at bedtime  trimethoprim / sulfamethoxazole IVPB 240 milliGRAM(s) IV Intermittent every 6 hours    MEDICATIONS  (PRN):  acetaminophen   Tablet .. 650 milliGRAM(s) Oral every 6 hours PRN Mild Pain (1 - 3)  dextrose 40% Gel 15 Gram(s) Oral once PRN Blood Glucose LESS THAN 70 milliGRAM(s)/deciliter  glucagon  Injectable 1 milliGRAM(s) IntraMuscular once PRN Glucose LESS THAN 70 milligrams/deciliter  hydrALAZINE Injectable 5 milliGRAM(s) IV Push every 6 hours PRN SBP >170  simethicone 80 milliGRAM(s) Chew three times a day PRN Gas  traMADol 50 milliGRAM(s) Oral two times a day PRN Moderate Pain (4 - 6)/Moderate Pain (4 - 6)      LABS:                            8.3    5.29  )-----------( 125      ( 19 Oct 2020 05:05 )             27.9     Hemoglobin: 8.3 g/dL (10-19 @ 05:05)  Hemoglobin: 8.7 g/dL (10-16 @ 06:13)    10-19    127<L>  |  97  |  8   ----------------------------<  77  3.6   |  22  |  <0.30<L>    Ca    8.1<L>      19 Oct 2020 05:05      Creatinine Trend: <0.30<--, <0.30<--, <0.30<--, <0.30<--, <0.30<--, <0.30<--             10-19-20 @ 07:01  -  10-20-20 @ 07:00  --------------------------------------------------------  IN: 1110 mL / OUT: 500 mL / NET: 610 mL        PHYSICAL EXAM  Vital Signs Last 24 Hrs  T(C): 36.9 (20 Oct 2020 04:53), Max: 36.9 (19 Oct 2020 11:59)  T(F): 98.4 (20 Oct 2020 04:53), Max: 98.5 (19 Oct 2020 11:59)  HR: 93 (20 Oct 2020 04:53) (78 - 93)  BP: 106/78 (20 Oct 2020 04:53) (95/58 - 127/55)  BP(mean): --  RR: 18 (20 Oct 2020 04:53) (18 - 18)  SpO2: 99% (20 Oct 2020 04:53) (98% - 99%)      Gen: Appears well in NAD  HEENT:  (-)icterus (-)pallor  CV: N S1 S2 1/6 JUAN (+)2 Pulses B/l  Resp:  Clear to auscultation B/L, normal effort  GI: (+) BS Soft, NT, ND  Lymph:  (-)Edema, (-)obvious lymphadenopathy  Skin: Warm to touch, Normal turgor  Psych: Appropriate mood and affect      TELEMETRY: SR 70-90	      ECG:  Sinus 86 BPM, LVH nonspecific T wave abnormalities     Echo:< from: Transthoracic Echocardiogram (10.09.20 @ 10:59) >  1. Calcified trileaflet aortic valve with decreased  opening. Peak transaortic valve gradient equals 16 mm Hg,  mean transaortic valve gradient equals 8 mm Hg, estimated  aortic valve area equals 1.6 sqcm (by continuity equation),  aortic valve velocity time integral equals 28 cm,  consistent with mild aortic stenosis.  2. Increased relative wall thickness with normal left  ventricular mass index, consistent with concentric left  ventricular remodeling.  3. Normal left ventricular systolic function. No segmental  wall motion abnormalities.  4. Normal right ventricular size and function.  *** No previous Echo exam.  Unable to rule out endocarditis, consider JAIME if clinicaly  indicated.  ------------------------------------------------------------------------  Confirmed on  10/9/2020 - 13:48:44 by FOREST Lawrence  ------------------------------------------------------------------------    < end of copied text >    ECHO: rachid< from: Limited Transthoracic Echo (w/3D) (10.17.20 @ 14:37) >  CONCLUSIONS:  1. Systolic anterior motion of the mitral valve leaflet.  2. Small left ventricle.  3. Hyperdynamic left ventricular systolic function.   Peak left ventricular outflow tract gradient equals 67 mm  Hg, mean gradient is equal to 19 mm Hg, LVOT velocity time  integral equals 66 cm, consistent with moderately severe  LVOT obstruction.  ------------------------------------------------------------------------  Confirmed on  10/17/2020 - 16:30:18 by FOREST Lawrence  ------------------------------------------------------------------------    < end of copied text >      ASSESSMENT/PLAN: 70y Female  history of RA, DM, HTN, fibromyalgia, hypothyroidism, brain aneurysm s/p repair BIBEMS for shortness of breath and fever PNA with an episode of hypotension and (+) Trop.    - Repeat echo confirms normal LVEF (no wall motion abnormalities) with a hyperdynamic LV creating moderate LVOT obstruction from MANSOOR.   - Recommend beta blockers, avoiding dehydration, avoiding afterload reducers - continue Toprol XL 25mg daily, lisinopril d/marli  - Recommend outpatient NST  - No further inpatient cardiac w/u needed  - Patient to f/u with her own cardiologist Dr. Woods after d/c    Lee Encinas PA-C  Pager: [phone]

## 2020-10-20 NOTE — CHART NOTE - NSCHARTNOTEFT_GEN_A_CORE
Tip of right arm PICC line is in acceptable position for use.    Alber Chirinos MD, RPVI  Chief Resident, Interventional Radiology  [facility]: [phone]  [facility]: [phone]

## 2020-10-20 NOTE — PROGRESS NOTE ADULT - ASSESSMENT
71 yo F with history of RA, DM, HTN, fibromyalgia, hypothyroidism, brain aneurysm s/p repair (2004?) BIBEMS for shortness of breath and fever. Per EMS, patient has had 3 days of fever, Tmax 102.5 , no known COVID exposures, took tylenol at unknown time before being brought in. patient didn't take her med for 2 days prior to admission. Patient c/o feeling fatigued, short of breath. Denies chest pain. Patient uses a wheelchair at baseline 2/2 arthritis. Intubated and in ICU extubated on 10/9 then transferred to floor.  Neurology called for dizzy sensation and h/o aneurysm.  Na improving 129-->130 today, Hgb also improving. 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 CTH obtained unremarkable. TTE with LVH, A1c7.3%  o/e proximal weakness> distal. Dizziness may be multifactorial 2/2 anemia and hyponatremia. dizziness now improved.   - dizziness improved no need for MRI brain.   - hyponatremia slow correction, f/u renal  - monitor Hgb improved   - need to better eval h/o aneurysm? CTA H/N   - check orthostatics if she is able to sit up. wheelchair bound  - pending NST per cardio can be outpatient?  - new macular rash, drug reaction?  - telemetry  - PT/OT, OOBC  - check FS, glucose control <180  - GI/DVT ppx  - Counseling on diet, exercise, and medication adherence was done  - Counseling on smoking cessation and alcohol consumption offered when appropriate.  - Pain assessed and judicious use of narcotics when appropriate was discussed.    - Stroke education given when appropriate.  - Importance of fall prevention discussed.   - Differential diagnosis and plan of care discussed with patient and/or family and primary team  - Thank you for allowing me to participate in the care of this patient. Call with questions.   - dispo planning  with home care  Dez Madera MD  Vascular Neurology  Office: [phone]

## 2020-10-20 NOTE — PROGRESS NOTE ADULT - ASSESSMENT
70 yo female with RA, DM, HTN, fibromyalgia, immune suppressed at home on Enbrel and 5 mg of prednisone bid  and remote repair of cerebral aneurysm now admitted with fever, weakness and hypoxia.  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  Required vent & pressors support in ICU for shock - shock resolved.   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  She recalls a rash to PCN, therefor is on meropenem which she appears to be tolerating  RVP is negative, MRSA screen positive.  TTE with mild AS, no vegetations.  Repeat 10/9 blood cultures are NG to date  She had developed pancytopenia, now improved.  S/p RUE PICC placement   Transient fever of 100.4 F on 10/14 - was pancultured  CTA chest neg for PE, CTH no acute event  Urine cx no growth   Recurrent fever of 100.2F on 10/17/20   Flow sheets reviewed and she has been afebrile since 10/17   clinically she is non toxic appearing, she is eager to go home   Repeat blood cx so far no growth   WBC is normal  Creatine is stable     Suggest:  D # 13 of 21 of antibiotics  she was receiving Merrem  New issues this AM she was found to have a new rash on through her body, macular rash is l suspect that this is a drug rash perhaps related to Merrem or other meds. JIMY Merrem and will start Bactrim 20 mg/kg per day divided doses q6 to continue treatment for Listeria bacteremia   Reviewed case with medicine NP on floor findings and changes in antibiotics, the patient will need to stay now with rash and changes of antibiotics and be observed

## 2020-10-21 LAB
ALBUMIN SERPL ELPH-MCNC: 2.8 G/DL — LOW (ref 3.3–5)
ALP SERPL-CCNC: 54 U/L — SIGNIFICANT CHANGE UP (ref 40–120)
ALT FLD-CCNC: 18 U/L — SIGNIFICANT CHANGE UP (ref 10–45)
ANION GAP SERPL CALC-SCNC: 9 MMOL/L — SIGNIFICANT CHANGE UP (ref 5–17)
AST SERPL-CCNC: 24 U/L — SIGNIFICANT CHANGE UP (ref 10–40)
BASOPHILS # BLD AUTO: 0.07 K/UL — SIGNIFICANT CHANGE UP (ref 0–0.2)
BASOPHILS NFR BLD AUTO: 1.8 % — SIGNIFICANT CHANGE UP (ref 0–2)
BILIRUB SERPL-MCNC: 0.3 MG/DL — SIGNIFICANT CHANGE UP (ref 0.2–1.2)
BUN SERPL-MCNC: 4 MG/DL — LOW (ref 7–23)
CALCIUM SERPL-MCNC: 8.2 MG/DL — LOW (ref 8.4–10.5)
CHLORIDE SERPL-SCNC: 101 MMOL/L — SIGNIFICANT CHANGE UP (ref 96–108)
CO2 SERPL-SCNC: 22 MMOL/L — SIGNIFICANT CHANGE UP (ref 22–31)
CREAT SERPL-MCNC: <0.3 MG/DL — LOW (ref 0.5–1.3)
CULTURE RESULTS: SIGNIFICANT CHANGE UP
EOSINOPHIL # BLD AUTO: 0 K/UL — SIGNIFICANT CHANGE UP (ref 0–0.5)
EOSINOPHIL NFR BLD AUTO: 0 % — SIGNIFICANT CHANGE UP (ref 0–6)
FERRITIN SERPL-MCNC: 124 NG/ML — SIGNIFICANT CHANGE UP (ref 15–150)
GLUCOSE BLDC GLUCOMTR-MCNC: 124 MG/DL — HIGH (ref 70–99)
GLUCOSE BLDC GLUCOMTR-MCNC: 152 MG/DL — HIGH (ref 70–99)
GLUCOSE BLDC GLUCOMTR-MCNC: 322 MG/DL — HIGH (ref 70–99)
GLUCOSE BLDC GLUCOMTR-MCNC: 75 MG/DL — SIGNIFICANT CHANGE UP (ref 70–99)
GLUCOSE SERPL-MCNC: 83 MG/DL — SIGNIFICANT CHANGE UP (ref 70–99)
HCT VFR BLD CALC: 24.9 % — LOW (ref 34.5–45)
HEPARIN-PF4 AB RESULT: <0.6 U/ML — SIGNIFICANT CHANGE UP (ref 0–0.9)
HGB BLD-MCNC: 7.2 G/DL — LOW (ref 11.5–15.5)
LDH SERPL L TO P-CCNC: 264 U/L — HIGH (ref 50–242)
LYMPHOCYTES # BLD AUTO: 1.34 K/UL — SIGNIFICANT CHANGE UP (ref 1–3.3)
LYMPHOCYTES # BLD AUTO: 35.8 % — SIGNIFICANT CHANGE UP (ref 13–44)
MCHC RBC-ENTMCNC: 23.8 PG — LOW (ref 27–34)
MCHC RBC-ENTMCNC: 28.9 GM/DL — LOW (ref 32–36)
MCV RBC AUTO: 82.5 FL — SIGNIFICANT CHANGE UP (ref 80–100)
MONOCYTES # BLD AUTO: 0.45 K/UL — SIGNIFICANT CHANGE UP (ref 0–0.9)
MONOCYTES NFR BLD AUTO: 11.9 % — SIGNIFICANT CHANGE UP (ref 2–14)
NEUTROPHILS # BLD AUTO: 1.86 K/UL — SIGNIFICANT CHANGE UP (ref 1.8–7.4)
NEUTROPHILS NFR BLD AUTO: 46.8 % — SIGNIFICANT CHANGE UP (ref 43–77)
OB PNL STL: NEGATIVE — SIGNIFICANT CHANGE UP
PF4 HEPARIN CMPLX AB SER-ACNC: NEGATIVE — SIGNIFICANT CHANGE UP
PLATELET # BLD AUTO: 111 K/UL — LOW (ref 150–400)
POTASSIUM SERPL-MCNC: 3.9 MMOL/L — SIGNIFICANT CHANGE UP (ref 3.5–5.3)
POTASSIUM SERPL-SCNC: 3.9 MMOL/L — SIGNIFICANT CHANGE UP (ref 3.5–5.3)
PROT SERPL-MCNC: 5.8 G/DL — LOW (ref 6–8.3)
RBC # BLD: 3.02 M/UL — LOW (ref 3.8–5.2)
RBC # FLD: 16.7 % — HIGH (ref 10.3–14.5)
RH IG SCN BLD-IMP: POSITIVE — SIGNIFICANT CHANGE UP
SARS-COV-2 RNA SPEC QL NAA+PROBE: SIGNIFICANT CHANGE UP
SODIUM SERPL-SCNC: 132 MMOL/L — LOW (ref 135–145)
T PALLIDUM AB TITR SER: NEGATIVE — SIGNIFICANT CHANGE UP
TSH SERPL-MCNC: 0.65 UIU/ML — SIGNIFICANT CHANGE UP (ref 0.27–4.2)
VIT B12 SERPL-MCNC: 1237 PG/ML — SIGNIFICANT CHANGE UP (ref 232–1245)
WBC # BLD: 3.75 K/UL — LOW (ref 3.8–10.5)
WBC # FLD AUTO: 3.75 K/UL — LOW (ref 3.8–10.5)

## 2020-10-21 RX ADMIN — POLYETHYLENE GLYCOL 3350 17 GRAM(S): 17 POWDER, FOR SOLUTION ORAL at 11:42

## 2020-10-21 RX ADMIN — Medication 2 TABLET(S): at 17:38

## 2020-10-21 RX ADMIN — Medication 75 MICROGRAM(S): at 21:53

## 2020-10-21 RX ADMIN — FONDAPARINUX SODIUM 2.5 MILLIGRAM(S): 2.5 INJECTION, SOLUTION SUBCUTANEOUS at 11:41

## 2020-10-21 RX ADMIN — CHLORHEXIDINE GLUCONATE 1 APPLICATION(S): 213 SOLUTION TOPICAL at 11:42

## 2020-10-21 RX ADMIN — Medication 25 MILLIGRAM(S): at 05:54

## 2020-10-21 RX ADMIN — CYCLOSPORINE 1 DROP(S): 0.5 EMULSION OPHTHALMIC at 17:35

## 2020-10-21 RX ADMIN — Medication 4: at 13:56

## 2020-10-21 RX ADMIN — Medication 265 MILLIGRAM(S): at 10:52

## 2020-10-21 RX ADMIN — Medication 5 MILLIGRAM(S): at 05:54

## 2020-10-21 RX ADMIN — Medication 1: at 09:56

## 2020-10-21 RX ADMIN — Medication 1 DROP(S): at 05:54

## 2020-10-21 RX ADMIN — Medication 1 DROP(S): at 17:35

## 2020-10-21 RX ADMIN — Medication 265 MILLIGRAM(S): at 02:32

## 2020-10-21 RX ADMIN — CYCLOSPORINE 1 DROP(S): 0.5 EMULSION OPHTHALMIC at 05:54

## 2020-10-21 RX ADMIN — Medication 1 DROP(S): at 05:53

## 2020-10-21 NOTE — PROGRESS NOTE ADULT - SUBJECTIVE AND OBJECTIVE BOX
Neurology Progress Note    S: Patient seen and examined. new macular rash on chest and LUE. drug reaction? now complains of abdominal pain today    Medication:  artificial  tears Solution 1 Drop(s) Both EYES two times a day  chlorhexidine 2% Cloths 1 Application(s) Topical daily  cycloSPORINE (RESTASIS) 0.05% Emulsion 1 Drop(s) Both EYES two times a day  dextrose 40% Gel 15 Gram(s) Oral once PRN  dextrose 5%. 1000 milliLiter(s) IV Continuous <Continuous>  dextrose 50% Injectable 12.5 Gram(s) IV Push once  dextrose 50% Injectable 25 Gram(s) IV Push once  dextrose 50% Injectable 25 Gram(s) IV Push once  fondaparinux Injectable 2.5 milliGRAM(s) SubCutaneous daily  glucagon  Injectable 1 milliGRAM(s) IntraMuscular once PRN  hydrALAZINE Injectable 5 milliGRAM(s) IV Push every 6 hours PRN  insulin lispro (HumaLOG) corrective regimen sliding scale   SubCutaneous three times a day before meals  insulin lispro (HumaLOG) corrective regimen sliding scale   SubCutaneous at bedtime  levothyroxine Injectable 75 MICROGram(s) IV Push at bedtime  lisinopril 10 milliGRAM(s) Oral daily  meropenem  IVPB 2000 milliGRAM(s) IV Intermittent every 8 hours  polyethylene glycol 3350 17 Gram(s) Oral daily  prednisoLONE acetate 1% Suspension 1 Drop(s) Both EYES two times a day  predniSONE   Tablet 5 milliGRAM(s) Oral daily  senna 2 Tablet(s) Oral at bedtime  simethicone 80 milliGRAM(s) Chew three times a day PRN      Vitals:  Vital Signs Last 24 Hrs  T(C): 36.8 (16 Oct 2020 05:01), Max: 37.1 (15 Oct 2020 20:54)  T(F): 98.3 (16 Oct 2020 05:01), Max: 98.7 (15 Oct 2020 20:54)  HR: 102 (16 Oct 2020 05:01) (70 - 102)  BP: 149/84 (16 Oct 2020 05:21) (100/64 - 149/84)  BP(mean): --  RR: 18 (16 Oct 2020 05:21) (18 - 18)  SpO2: 98% (16 Oct 2020 05:21) (96% - 98%)    General Exam:   General Appearance: Appropriately dressed and in no acute distress       Head: Normocephalic, atraumatic and no dysmorphic features  Ear, Nose, and Throat: Moist mucous membranes  CVS: S1S2+  Resp: mild SOB but no wheeze  GI: soft NT/ND  Extremeties: mild LE edema no  cyanosis  Skin: some bruises from IV, LUE rash and macular rash on chest      Neurological Exam:  Mental Status: Awake, alert and oriented x 3.  Able to follow simple and complex verbal commands. Able to name and repeat. fluent speech. No obvious aphasia or dysarthria noted.   Cranial Nerves: PERRL, EOMI, VFFC, sensation V1-V3 intact,  no obvious facial assymetry, equal elevation of palate, scm/trap 5/5, tongue is midline on protrusion. no obvious papilledema on fundoscopic exam. hearing is grossly intact.   Motor: GAMA but proximally limited motion 1/5 in UE and LE proximally, but distally x 4 she is 4/5 (baseline?)   Sensation: Intact to light touch and pinprick throughout. no right/left confusion.   Reflexes: 1+ throughout at biceps, brachioradialis, triceps, patellars and ankles bilaterally and equal. No clonus. R toe and L toe were both downgoing.  Coordination: unable   Gait: wheelchair bound    I personally reviewed the below data/images/labs:      artificial  tears Solution 1 Drop(s) Both EYES two times a day  chlorhexidine 2% Cloths 1 Application(s) Topical daily  cycloSPORINE (RESTASIS) 0.05% Emulsion 1 Drop(s) Both EYES two times a day  dextrose 40% Gel 15 Gram(s) Oral once PRN  dextrose 5%. 1000 milliLiter(s) IV Continuous <Continuous>  dextrose 50% Injectable 12.5 Gram(s) IV Push once  dextrose 50% Injectable 25 Gram(s) IV Push once  dextrose 50% Injectable 25 Gram(s) IV Push once  fondaparinux Injectable 2.5 milliGRAM(s) SubCutaneous daily  glucagon  Injectable 1 milliGRAM(s) IntraMuscular once PRN  hydrALAZINE Injectable 5 milliGRAM(s) IV Push every 6 hours PRN  insulin lispro (HumaLOG) corrective regimen sliding scale   SubCutaneous three times a day before meals  insulin lispro (HumaLOG) corrective regimen sliding scale   SubCutaneous at bedtime  levothyroxine Injectable 75 MICROGram(s) IV Push at bedtime  lisinopril 10 milliGRAM(s) Oral daily  meropenem  IVPB 2000 milliGRAM(s) IV Intermittent every 8 hours  polyethylene glycol 3350 17 Gram(s) Oral daily  prednisoLONE acetate 1% Suspension 1 Drop(s) Both EYES two times a day  predniSONE   Tablet 5 milliGRAM(s) Oral daily  senna 2 Tablet(s) Oral at bedtime  simethicone 80 milliGRAM(s) Chew three times a day PRN    CBC Full  -  ( 16 Oct 2020 06:13 )  WBC Count : 6.96 K/uL  RBC Count : 3.55 M/uL  Hemoglobin : 8.7 g/dL  Hematocrit : 29.1 %  Platelet Count - Automated : 171 K/uL  Mean Cell Volume : 82.0 fl  Mean Cell Hemoglobin : 24.5 pg  Mean Cell Hemoglobin Concentration : 29.9 gm/dL  Auto Neutrophil # : x  Auto Lymphocyte # : x  Auto Monocyte # : x  Auto Eosinophil # : x  Auto Basophil # : x  Auto Neutrophil % : x  Auto Lymphocyte % : x  Auto Monocyte % : x  Auto Eosinophil % : x  Auto Basophil % : x    10-16    130<L>  |  97  |  8   ----------------------------<  124<H>  4.3   |  22  |  <0.30<L>    Ca    8.5      16 Oct 2020 06:13          Urinalysis Basic - ( 14 Oct 2020 15:24 )    Color: Light Yellow / Appearance: Clear / S.013 / pH: x  Gluc: x / Ketone: Trace  / Bili: Negative / Urobili: Negative   Blood: x / Protein: Trace / Nitrite: Negative   Leuk Esterase: Negative / RBC: 3 /hpf / WBC 1 /HPF   Sq Epi: x / Non Sq Epi: 2 /hpf / Bacteria: Negative            CTA chest: < from: CT Angio Chest w/ IV Cont (10.14.20 @ 21:30) >  FINDINGS:    LUNGS AND AIRWAYS: Low lung volumes. Elevated diaphragm. Interval improvement in bilateral posterior and dependent consolidations previously seen on chest CT from 10/7/2020.  PLEURA: Bilateral small left and trace right pleural effusions.  MEDIASTINUM AND ROSEMARY: No lymphadenopathy.  VESSELS: The main pulmonary artery measures 2.1 cm. There is no main, central, lobar, or segmental pulmonary embolus. Right upper extremity PICC with tip terminating in the right atrium. Calcified and noncalcified ulcerative plaque involving the aorta.  HEART: Heart size is normal. No pericardial effusion. Coronary arterial calcifications. No signs of right heart strain.  CHEST WALL AND LOWER NECK: Within normal limits.  VISUALIZED UPPER ABDOMEN: Embolization coil mass in the gastrohepatic space.  BONES: Degenerative changes.    IMPRESSION:    No main, lobar, or segmental pulmonary embolism. Limited evaluation the subsegmental pulmonary arteries due to respiratory motion.    Improvement in bilateral atelectasis seen on chest CT from 10/7/2020. Small left and trace right pleural effusions.      CTH 10/14/20  IMPRESSION: No acute intracranial hemorrhage, mass effect, or shift of the midline structures.    Mild interval advancement of chronic white matter microvascular type changes.    TTE: 1. Calcified trileaflet aortic valve with decreased  opening. Peak transaortic valve gradient equals 16 mm Hg,  mean transaortic valve gradient equals 8 mm Hg, estimated  aortic valve area equals 1.6 sqcm (by continuity equation),  aortic valve velocity time integral equals 28 cm,  consistent with mild aortic stenosis.  2. Increased relative wall thickness with normal left  ventricular mass index, consistent with concentric left  ventricular remodeling.  3. Normal left ventricular systolic function. No segmental  wall motion abnormalities.  4. Normal right ventricular size and function.  *** No previous Echo exam.  Unable to rule out endocarditis, consider JAIME if clinicaly  indicated.  Hgb A1c 7.3

## 2020-10-21 NOTE — PROGRESS NOTE ADULT - SUBJECTIVE AND OBJECTIVE BOX
Patient is a 70y old  Female who presents with a chief complaint of SOB (09 Oct 2020 12:09)      SUBJECTIVE / OVERNIGHT EVENTS:  Macular generalized rash improved.  Noted to be anemic    Review of Systems:   MEDICATIONS  (STANDING):  artificial  tears Solution 1 Drop(s) Both EYES two times a day  cycloSPORINE (RESTASIS) 0.05% Emulsion 1 Drop(s) Both EYES two times a day  dextrose 5%. 1000 milliLiter(s) (50 mL/Hr) IV Continuous <Continuous>  dextrose 50% Injectable 12.5 Gram(s) IV Push once  dextrose 50% Injectable 25 Gram(s) IV Push once  dextrose 50% Injectable 25 Gram(s) IV Push once  insulin lispro (HumaLOG) corrective regimen sliding scale   SubCutaneous every 6 hours  levothyroxine Injectable 50 MICROGram(s) IV Push at bedtime  lisinopril 20 milliGRAM(s) Oral daily  meropenem  IVPB 2000 milliGRAM(s) IV Intermittent every 8 hours  methylPREDNISolone sodium succinate Injectable 8 milliGRAM(s) IV Push daily  potassium phosphate / sodium phosphate Powder (PHOS-NaK) 1 Packet(s) Oral two times a day before meals  prednisoLONE acetate 1% Suspension 1 Drop(s) Both EYES two times a day    MEDICATIONS  (PRN):  dextrose 40% Gel 15 Gram(s) Oral once PRN Blood Glucose LESS THAN 70 milliGRAM(s)/deciliter  glucagon  Injectable 1 milliGRAM(s) IntraMuscular once PRN Glucose LESS THAN 70 milligrams/deciliter  hydrALAZINE Injectable 5 milliGRAM(s) IV Push every 6 hours PRN SBP >170      PHYSICAL EXAM:  Vital Signs Last 24 Hrs  T(C): 36.6 (09 Oct 2020 20:00), Max: 37 (09 Oct 2020 04:00)  T(F): 97.9 (09 Oct 2020 20:00), Max: 98.6 (09 Oct 2020 04:00)  HR: 88 (09 Oct 2020 22:00) (69 - 124)  BP: 156/71 (09 Oct 2020 22:00) (116/58 - 210/99)  BP(mean): 102 (09 Oct 2020 22:00) (79 - 142)  RR: 16 (09 Oct 2020 22:00) (15 - 46)  SpO2: 98% (09 Oct 2020 22:00) (93% - 100%)  I&O's Summary    08 Oct 2020 07:01  -  09 Oct 2020 07:00  --------------------------------------------------------  IN: 491.4 mL / OUT: 640 mL / NET: -148.6 mL    09 Oct 2020 07:01  -  09 Oct 2020 22:55  --------------------------------------------------------  IN: 2670 mL / OUT: 300 mL / NET: 2370 mL    ROS:  Constipated  Bed bound  GENERAL:A & Ox3  HEAD:  Atraumatic, Normocephalic  EYES: EOMI, PERRLA, conjunctiva and sclera clear  NECK: Supple, No JVD  CHEST/LUNG: Clear to auscultation bilaterally; No wheeze  HEART: Regular rate and rhythm; No murmurs, rubs, or gallops  ABDOMEN: Soft, Nontender, Nondistended; Bowel sounds present  EXTREMITIES:  2+ Peripheral Pulses, No clubbing, cyanosis, or edema  PSYCH: AAOx3  NEUROLOGY: non-focal. Gen weakness, bed bound. Power in all extremities is   SKIN:generalized rash    LABS:  CAPILLARY BLOOD GLUCOSE      POCT Blood Glucose.: 239 mg/dL (09 Oct 2020 18:44)  POCT Blood Glucose.: 104 mg/dL (09 Oct 2020 05:11)  POCT Blood Glucose.: 151 mg/dL (08 Oct 2020 23:13)                          8.1    5.73  )-----------( 140      ( 09 Oct 2020 12:44 )             25.6     10-09    131<L>  |  101  |  10  ----------------------------<  96  3.6   |  16<L>  |  <0.30<L>    Ca    8.8      09 Oct 2020 12:44  Phos  2.1     10-09  Mg     1.6     10-09    TPro  6.0  /  Alb  2.9<L>  /  TBili  0.4  /  DBili  x   /  AST  38  /  ALT  57<H>  /  AlkPhos  41  10-09    PT/INR - ( 09 Oct 2020 00:21 )   PT: 13.6 sec;   INR: 1.14 ratio         PTT - ( 09 Oct 2020 00:21 )  PTT:30.2 sec          RADIOLOGY & ADDITIONAL TESTS:    Imaging Personally Reviewed:    Consultant(s) Notes Reviewed:      Care Discussed with Consultants/Other Providers:

## 2020-10-21 NOTE — PROGRESS NOTE ADULT - ASSESSMENT
71 yo F with history of RA, DM, HTN, fibromyalgia, hypothyroidism, brain aneurysm s/p repair (2004?) BIBEMS for shortness of breath and fever. Per EMS, patient has had 3 days of fever, Tmax 102.5 , no known COVID exposures, took tylenol at unknown time before being brought in. patient didn't take her med for 2 days prior to admission. Patient c/o feeling fatigued, short of breath. Denies chest pain. Patient uses a wheelchair at baseline 2/2 arthritis. Intubated and in ICU extubated on 10/9 then transferred to floor.  Neurology called for dizzy sensation and h/o aneurysm.  Na improving 129-->130 today, Hgb also improving. 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 CTH obtained unremarkable. TTE with LVH, A1c7.3%  o/e proximal weakness> distal. Dizziness may be multifactorial 2/2 anemia and hyponatremia. dizziness now improved.   - dizziness improved no need for MRI brain.   - hyponatremia slow correction, f/u renal  - monitor Hgb improved   - pending NST per cardio can be outpatient?  - new macular rash, drug reaction? improved, on meropenum will be switching to bactrim  - telemetry  - PT/OT, OOBC  - check FS, glucose control <180  - GI/DVT ppx  - Counseling on diet, exercise, and medication adherence was done  - Counseling on smoking cessation and alcohol consumption offered when appropriate.  - Pain assessed and judicious use of narcotics when appropriate was discussed.    - Stroke education given when appropriate.  - Importance of fall prevention discussed.   - Differential diagnosis and plan of care discussed with patient and/or family and primary team  - Thank you for allowing me to participate in the care of this patient. Call with questions.   - dispo planning  with home care  Dez Madera MD  Vascular Neurology  Office: [phone]

## 2020-10-21 NOTE — PROGRESS NOTE ADULT - SUBJECTIVE AND OBJECTIVE BOX
Follow-up Pulm Progress Note    No new respiratory events overnight.  Denies SOB/CP.   Rash improved  Sats 97% RA    Medications:  MEDICATIONS  (STANDING):  artificial  tears Solution 1 Drop(s) Both EYES two times a day  chlorhexidine 2% Cloths 1 Application(s) Topical daily  cycloSPORINE (RESTASIS) 0.05% Emulsion 1 Drop(s) Both EYES two times a day  dextrose 5%. 1000 milliLiter(s) (50 mL/Hr) IV Continuous <Continuous>  dextrose 50% Injectable 12.5 Gram(s) IV Push once  dextrose 50% Injectable 25 Gram(s) IV Push once  dextrose 50% Injectable 25 Gram(s) IV Push once  fondaparinux Injectable 2.5 milliGRAM(s) SubCutaneous daily  insulin lispro (ADMELOG) corrective regimen sliding scale   SubCutaneous three times a day before meals  insulin lispro (ADMELOG) corrective regimen sliding scale   SubCutaneous at bedtime  levothyroxine Injectable 75 MICROGram(s) IV Push at bedtime  metoprolol succinate ER 25 milliGRAM(s) Oral daily  polyethylene glycol 3350 17 Gram(s) Oral daily  prednisoLONE acetate 1% Suspension 1 Drop(s) Both EYES two times a day  predniSONE   Tablet 5 milliGRAM(s) Oral daily  senna 2 Tablet(s) Oral at bedtime  trimethoprim / sulfamethoxazole IVPB 240 milliGRAM(s) IV Intermittent every 6 hours    MEDICATIONS  (PRN):  acetaminophen   Tablet .. 650 milliGRAM(s) Oral every 6 hours PRN Mild Pain (1 - 3)  dextrose 40% Gel 15 Gram(s) Oral once PRN Blood Glucose LESS THAN 70 milliGRAM(s)/deciliter  glucagon  Injectable 1 milliGRAM(s) IntraMuscular once PRN Glucose LESS THAN 70 milligrams/deciliter  hydrALAZINE Injectable 5 milliGRAM(s) IV Push every 6 hours PRN SBP >170  simethicone 80 milliGRAM(s) Chew three times a day PRN Gas  traMADol 50 milliGRAM(s) Oral two times a day PRN Moderate Pain (4 - 6)/Moderate Pain (4 - 6)          Vital Signs Last 24 Hrs  T(C): 36.6 (21 Oct 2020 04:51), Max: 36.8 (20 Oct 2020 20:50)  T(F): 97.8 (21 Oct 2020 04:51), Max: 98.3 (20 Oct 2020 20:50)  HR: 71 (21 Oct 2020 05:51) (68 - 76)  BP: 118/69 (21 Oct 2020 05:51) (111/63 - 155/68)  BP(mean): --  RR: 18 (21 Oct 2020 05:51) (18 - 18)  SpO2: 99% (21 Oct 2020 05:51) (99% - 100%)          10-20 @ 07:01  -  10-21 @ 07:00  --------------------------------------------------------  IN: 848 mL / OUT: 800 mL / NET: 48 mL          LABS:                        7.2    3.75  )-----------( 111      ( 21 Oct 2020 07:06 )             24.9     10-21    132<L>  |  101  |  4<L>  ----------------------------<  83  3.9   |  22  |  <0.30<L>    Ca    8.2<L>      21 Oct 2020 07:06    TPro  5.8<L>  /  Alb  2.8<L>  /  TBili  0.3  /  DBili  x   /  AST  24  /  ALT  18  /  AlkPhos  54  10-21          CAPILLARY BLOOD GLUCOSE      POCT Blood Glucose.: 152 mg/dL (21 Oct 2020 08:51)      CULTURES:     Culture - Blood (collected 10-17-20 @ 18:24)  Source: .Blood Blood-Peripheral  Preliminary Report (10-18-20 @ 19:02):    No growth to date.    Culture - Blood (collected 10-17-20 @ 18:24)  Source: .Blood Blood-Peripheral  Preliminary Report (10-18-20 @ 19:02):    No growth to date.    Culture - Blood (collected 10-14-20 @ 17:44)  Source: .Blood Blood  Final Report (10-19-20 @ 18:00):    No Growth Final    Culture - Blood (collected 10-14-20 @ 17:44)  Source: .Blood Blood  Final Report (10-19-20 @ 18:00):    No Growth Final        Culture - Urine (collected 10-14-20 @ 18:50)  Source: .Urine Catheterized  Final Report (10-15-20 @ 17:01):    No growth          Physical Examination:  PULM: Clear to auscultation bilaterally, no significant sputum production  CVS: RRR    RADIOLOGY REVIEWED  CT chest: < from: CT Angio Chest w/ IV Cont (10.14.20 @ 21:30) >  FINDINGS:    LUNGS AND AIRWAYS: Low lung volumes. Elevated diaphragm. Interval improvement in bilateral posterior and dependent consolidations previously seen on chest CT from 10/7/2020.  PLEURA: Bilateral small left and trace right pleural effusions.  MEDIASTINUM AND ROSEMARY: No lymphadenopathy.  VESSELS: The main pulmonary artery measures 2.1 cm. There is no main, central, lobar, or segmental pulmonary embolus. Right upper extremity PICC with tip terminating in the right atrium. Calcified and noncalcified ulcerative plaque involving the aorta.  HEART: Heart size is normal. No pericardial effusion. Coronary arterial calcifications. No signs of right heart strain.  CHEST WALL AND LOWER NECK: Within normal limits.  VISUALIZED UPPER ABDOMEN: Embolization coil mass in the gastrohepatic space.  BONES: Degenerative changes.    IMPRESSION:    No main, lobar, or segmental pulmonary embolism. Limited evaluation the subsegmental pulmonary arteries due to respiratory motion.    Improvement in bilateral atelectasis seen on chest CT from 10/7/2020. Small left and trace right pleural effusions.            < end of copied text >

## 2020-10-21 NOTE — PROGRESS NOTE ADULT - SUBJECTIVE AND OBJECTIVE BOX
NEPHROLOGY-[facility] [phone]        Patient seen and examined in bed.  SHe was about the same         MEDICATIONS  (STANDING):  artificial  tears Solution 1 Drop(s) Both EYES two times a day  chlorhexidine 2% Cloths 1 Application(s) Topical daily  cycloSPORINE (RESTASIS) 0.05% Emulsion 1 Drop(s) Both EYES two times a day  dextrose 5%. 1000 milliLiter(s) (50 mL/Hr) IV Continuous <Continuous>  dextrose 50% Injectable 12.5 Gram(s) IV Push once  dextrose 50% Injectable 25 Gram(s) IV Push once  dextrose 50% Injectable 25 Gram(s) IV Push once  fondaparinux Injectable 2.5 milliGRAM(s) SubCutaneous daily  insulin lispro (ADMELOG) corrective regimen sliding scale   SubCutaneous three times a day before meals  insulin lispro (ADMELOG) corrective regimen sliding scale   SubCutaneous at bedtime  levothyroxine Injectable 75 MICROGram(s) IV Push at bedtime  metoprolol succinate ER 25 milliGRAM(s) Oral daily  polyethylene glycol 3350 17 Gram(s) Oral daily  prednisoLONE acetate 1% Suspension 1 Drop(s) Both EYES two times a day  predniSONE   Tablet 5 milliGRAM(s) Oral daily  senna 2 Tablet(s) Oral at bedtime  trimethoprim / sulfamethoxazole IVPB 240 milliGRAM(s) IV Intermittent every 6 hours      VITAL:  T(C): , Max: 36.8 (10-20-20 @ 20:50)  T(F): , Max: 98.3 (10-20-20 @ 20:50)  HR: 71 (10-21-20 @ 05:51)  BP: 118/69 (10-21-20 @ 05:51)  BP(mean): --  RR: 18 (10-21-20 @ 05:51)  SpO2: 99% (10-21-20 @ 05:51)  Wt(kg): --    I and O's:    10-20 @ 07:01  -  10-21 @ 07:00  --------------------------------------------------------  IN: 848 mL / OUT: 800 mL / NET: 48 mL          PHYSICAL EXAM:    Constitutional: NAD  Neck:  No JVD  Respiratory: CTAB/L  Cardiovascular: S1 and S2  Gastrointestinal: BS+, soft, NT/ND  Extremities: No peripheral edema  Neurological: A/O x 3, no focal deficits  Psychiatric: Normal mood, normal affect  : No Mcbride  Skin: No rashes  Access: Not applicable    LABS:                        7.2    3.75  )-----------( 111      ( 21 Oct 2020 07:06 )             24.9     10-21    132<L>  |  101  |  4<L>  ----------------------------<  83  3.9   |  22  |  <0.30<L>    Ca    8.2<L>      21 Oct 2020 07:06    TPro  5.8<L>  /  Alb  2.8<L>  /  TBili  0.3  /  DBili  x   /  AST  24  /  ALT  18  /  AlkPhos  54  10-21          Urine Studies:          RADIOLOGY & ADDITIONAL STUDIES:

## 2020-10-21 NOTE — PROGRESS NOTE ADULT - SUBJECTIVE AND OBJECTIVE BOX
CARDIOLOGY ATTENDING    She denies palpitations, syncope, nor angina, ROS otherwise -      acetaminophen   Tablet .. 650 milliGRAM(s) Oral every 6 hours PRN  artificial  tears Solution 1 Drop(s) Both EYES two times a day  chlorhexidine 2% Cloths 1 Application(s) Topical daily  cycloSPORINE (RESTASIS) 0.05% Emulsion 1 Drop(s) Both EYES two times a day  dextrose 40% Gel 15 Gram(s) Oral once PRN  dextrose 5%. 1000 milliLiter(s) IV Continuous <Continuous>  dextrose 50% Injectable 12.5 Gram(s) IV Push once  dextrose 50% Injectable 25 Gram(s) IV Push once  dextrose 50% Injectable 25 Gram(s) IV Push once  fondaparinux Injectable 2.5 milliGRAM(s) SubCutaneous daily  glucagon  Injectable 1 milliGRAM(s) IntraMuscular once PRN  hydrALAZINE Injectable 5 milliGRAM(s) IV Push every 6 hours PRN  insulin lispro (ADMELOG) corrective regimen sliding scale   SubCutaneous three times a day before meals  insulin lispro (ADMELOG) corrective regimen sliding scale   SubCutaneous at bedtime  levothyroxine Injectable 75 MICROGram(s) IV Push at bedtime  metoprolol succinate ER 25 milliGRAM(s) Oral daily  polyethylene glycol 3350 17 Gram(s) Oral daily  prednisoLONE acetate 1% Suspension 1 Drop(s) Both EYES two times a day  predniSONE   Tablet 5 milliGRAM(s) Oral daily  senna 2 Tablet(s) Oral at bedtime  simethicone 80 milliGRAM(s) Chew three times a day PRN  traMADol 50 milliGRAM(s) Oral two times a day PRN  trimethoprim / sulfamethoxazole IVPB 240 milliGRAM(s) IV Intermittent every 6 hours                            7.2    3.75  )-----------( 111      ( 21 Oct 2020 07:06 )             24.9       10-21    132<L>  |  101  |  4<L>  ----------------------------<  83  3.9   |  22  |  <0.30<L>    Ca    8.2<L>      21 Oct 2020 07:06    TPro  5.8<L>  /  Alb  2.8<L>  /  TBili  0.3  /  DBili  x   /  AST  24  /  ALT  18  /  AlkPhos  54  10-21      T(C): 36.6 (10-21-20 @ 04:51), Max: 36.8 (10-20-20 @ 20:50)  HR: 71 (10-21-20 @ 05:51) (68 - 76)  BP: 118/69 (10-21-20 @ 05:51) (111/63 - 155/68)  RR: 18 (10-21-20 @ 05:51) (18 - 18)  SpO2: 99% (10-21-20 @ 05:51) (99% - 100%)  Wt(kg): --    I&O's Summary    20 Oct 2020 07:01  -  21 Oct 2020 07:00  --------------------------------------------------------  IN: 848 mL / OUT: 800 mL / NET: 48 mL      A&O x 3  neurologically intact  no JVD  RRR, no murmurs  CTAB  soft nt/nd  no c/c/e      ASSESSMENT/PLAN: 70y Female  history of RA, DM, HTN, fibromyalgia, hypothyroidism, brain aneurysm s/p repair BIBEMS for shortness of breath and fever PNA with an episode of hypotension and (+) Trop.    - Repeat echo confirms normal LVEF (no wall motion abnormalities) with a hyperdynamic LV creating moderate LVOT obstruction from MANSOOR.   - Recommend beta blockers, avoiding dehydration, avoiding afterload reducers - continue Toprol XL 25mg daily, lisinopril d/marli  - Recommend outpatient NST  - No further inpatient cardiac w/u needed  - Patient to f/u with her own cardiologist Dr. Woods after d/c      Miguelangel Villatoro M.D., [facility]  Cardiac Electrophysiology  [facility]  [address] [place] [address], [address] [place] [address]  [email or web address]."[email or web address]"    office [phone]  pager [phone]

## 2020-10-21 NOTE — PROGRESS NOTE ADULT - ASSESSMENT
72 yo female with RA, DM, HTN, fibromyalgia, immune suppressed at home on Enbrel and 5 mg of prednisone bid  and remote repair of cerebral aneurysm now admitted with fever, weakness and hypoxia.  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  Required vent & pressors support in ICU for shock - shock resolved.   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  She recalls a rash to PCN, therefor was started  on meropenem which she appeared  to be tolerating  RVP is negative, MRSA screen positive.  TTE with mild AS, no vegetations.  Repeat 10/9 blood cultures are NG to date  She had developed pancytopenia, now improved.  S/p RUE PICC placement   Transient fever of 100.4 F on 10/14 - was pancultured  CTA chest neg for PE, CTH no acute event  Urine cx no growth   Recurrent fever of 100.2F on 10/17/20   she has been afebrile since 10/17    Repeat blood cx so far no growth   WBC is normal, she is anemic  Creatine is stable   meropenem switched to bactrim yesterday with rash  she is tolerating bactrim so far  Suggest:  D # 14 of 21 of antibiotics  she was receiving Merrem  Will switch to oral bactrim, 2 ds tabs po TID  she should be observed to make sure she can tolerate meds  Will ask RN to check wt, computer has 48 kg.   72 yo female with RA, DM, HTN, fibromyalgia, immune suppressed at home on Enbrel and 5 mg of prednisone bid  and remote repair of cerebral aneurysm now admitted with fever, weakness and hypoxia.  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  Required vent & pressors support in ICU for shock - shock resolved.   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  She recalls a rash to PCN, therefor was started  on meropenem which she appeared  to be tolerating  RVP is negative, MRSA screen positive.  TTE with mild AS, no vegetations.  Repeat 10/9 blood cultures are NG to date  She had developed pancytopenia, now improved.  S/p RUE PICC placement   Transient fever of 100.4 F on 10/14 - was pancultured  CTA chest neg for PE, CTH no acute event  Urine cx no growth   Recurrent fever of 100.2F on 10/17/20   she has been afebrile since 10/17    Repeat blood cx so far no growth   WBC is normal, she is anemic  Creatine is stable   meropenem switched to bactrim yesterday with rash  she is tolerating bactrim so far  Suggest:  D # 14 of 21 of antibiotics  she was receiving Merrem  Will switch to oral bactrim, 2 ds tabs po TID  she should be observed to make sure she can tolerate meds  Will ask RN to check wt, computer has 48 kg.Her repeat wt is about 45 KG, hopefully an accurate number   72 yo female with RA, DM, HTN, fibromyalgia, immune suppressed at home on Enbrel and 5 mg of prednisone bid  and remote repair of cerebral aneurysm now admitted with fever, weakness and hypoxia.  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  Required vent & pressors support in ICU for shock - shock resolved.   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  She recalls a rash to PCN, therefor was started  on meropenem which she appeared  to be tolerating  RVP is negative, MRSA screen positive.  TTE with mild AS, no vegetations.  Repeat 10/9 blood cultures are NG to date  She had developed pancytopenia, now improved.  S/p RUE PICC placement   Transient fever of 100.4 F on 10/14 - was pancultured  CTA chest neg for PE, CTH no acute event  Urine cx no growth   Recurrent fever of 100.2F on 10/17/20   she has been afebrile since 10/17    Repeat blood cx so far no growth   WBC is normal, she is anemic  Creatine is stable   meropenem switched to bactrim yesterday with rash  she is tolerating bactrim so far  Her anemia and thrombocytopenia and leukopenia predated bactrim.  Suggest:  D # 14 of 21 of antibiotics  she was receiving Merrem  Will switch to oral bactrim, 2 ds tabs po BID  she should be observed to make sure she can tolerate meds  Will ask RN to check wt, computer has 48 kg.Her repeat wt is about 45 KG, hopefully an accurate number

## 2020-10-21 NOTE — CHART NOTE - NSCHARTNOTEFT_GEN_A_CORE
Nutrition Follow Up Note  Patient seen for: follow up on 3 DSU    Reason for Admission	SOB    pt was to be discharged yesterday but now with  -?drug rash from meropenem. ID f/u.     Source: pt, EMR, nurse    Diet : Diet, Consistent Carbohydrate/No Snacks:   Lacto Veg (Accepts Milk & Milk Products)  No Beef  No Egg  No Fish  No Pork  No Poultry  No Shellfish  Supplement Feeding Modality:  Oral  Glucerna Shake Cans or Servings Per Day:  3       Frequency:  Daily  Probiotic Yogurt/Smoothie Cans or Servings Per Day:  2       Frequency:  Daily (10-15- @ 17:38) [Active]      Patient reports: dislikes Danactive, nurse reports pt stomach did not feel well after drinking it. pt drinking Glucerna later on in the day one on her own and one with her son. she still wishes to continue receiving 3 daily. she reports that she has always been a small portion eater.      PO intake : 25% of tray.     20-65% of trays as per flow sheet      Daily Weight in k (10-21), Weight in k (10-20), Weight in k (10-19), Weight in k.5 (10-18), Weight in k.4 (10-17), Weight in k.6 (10-16)  % Weight Change: 1% loss since 10/16    Pertinent Medications: MEDICATIONS  (STANDING):  artificial  tears Solution 1 Drop(s) Both EYES two times a day  chlorhexidine 2% Cloths 1 Application(s) Topical daily  cycloSPORINE (RESTASIS) 0.05% Emulsion 1 Drop(s) Both EYES two times a day  dextrose 5%. 1000 milliLiter(s) (50 mL/Hr) IV Continuous <Continuous>  dextrose 50% Injectable 12.5 Gram(s) IV Push once  dextrose 50% Injectable 25 Gram(s) IV Push once  dextrose 50% Injectable 25 Gram(s) IV Push once  fondaparinux Injectable 2.5 milliGRAM(s) SubCutaneous daily  insulin lispro (ADMELOG) corrective regimen sliding scale   SubCutaneous three times a day before meals  insulin lispro (ADMELOG) corrective regimen sliding scale   SubCutaneous at bedtime  levothyroxine Injectable 75 MICROGram(s) IV Push at bedtime  metoprolol succinate ER 25 milliGRAM(s) Oral daily  polyethylene glycol 3350 17 Gram(s) Oral daily  prednisoLONE acetate 1% Suspension 1 Drop(s) Both EYES two times a day  predniSONE   Tablet 5 milliGRAM(s) Oral daily  senna 2 Tablet(s) Oral at bedtime  trimethoprim / sulfamethoxazole IVPB 240 milliGRAM(s) IV Intermittent every 6 hours    MEDICATIONS  (PRN):  acetaminophen   Tablet .. 650 milliGRAM(s) Oral every 6 hours PRN Mild Pain (1 - 3)  dextrose 40% Gel 15 Gram(s) Oral once PRN Blood Glucose LESS THAN 70 milliGRAM(s)/deciliter  glucagon  Injectable 1 milliGRAM(s) IntraMuscular once PRN Glucose LESS THAN 70 milligrams/deciliter  hydrALAZINE Injectable 5 milliGRAM(s) IV Push every 6 hours PRN SBP >170  simethicone 80 milliGRAM(s) Chew three times a day PRN Gas  traMADol 50 milliGRAM(s) Oral two times a day PRN Moderate Pain (4 - 6)/Moderate Pain (4 - 6)    Pertinent Labs: 10-21 @ 07:06: Na 132<L>, BUN 4<L>, Cr <0.30<L>, BG 83, K+ 3.9, Phos --, Mg --, Alk Phos 54, ALT/SGPT 18, AST/SGOT 24  10-20 @ 14:58: Na 130<L>, BUN 8, Cr <0.30<L>, <H>, K+ 4.1    Finger Sticks:  POCT Blood Glucose.: 152 mg/dL (10-21 @ 08:51)  POCT Blood Glucose.: 158 mg/dL (10-20 @ 21:47)  POCT Blood Glucose.: 113 mg/dL (10-20 @ 17:38)  POCT Blood Glucose.: 206 mg/dL (10-20 @ 12:48)      Skin per nursing documentation: no pressure injury  Edema: none    Estimated Needs:   [ x] no change since previous assessment  [ ] recalculated:     Previous Nutrition Diagnosis: Decreased Nutrient Needs  Nutrition Diagnosis is: addresses with modified diet- Consistent Carbohydrate              Recommend/Interventions:   continue Consistent Carbohydrate, Veglacto, nobeef, noegg, nofish, no pork,  no poultry, noshellfish  continue Glucerna x 3 daily  monitor need for diet ed reinforcement  placed pending verification to discontinue Danactive    Monitoring and Evaluation:     Continue to monitor Nutritional intake, Tolerance to diet prescription, weights, labs, skin integrity    RD remains available upon request and will follow up per protocol  Denise Hector MA, RD, CDN #[phone]

## 2020-10-21 NOTE — CHART NOTE - NSCHARTNOTEFT_GEN_A_CORE
Informed by RN earlier today that Hgb 7.2. Pt seen, denies any signs of bleeding, sob, , dizziness or chest pain. Discussed with Dr. Daniel, will transfuse 1 unit PRBC. Pt also stated having lower  abdominal discomfort. Pt verbalized that its a sharp pain, and informed that she has not had a BM in 3 days. Pt given enema with +BM. In discussion with Dr. Daniel, GI c/s may be consider to eval source of anemia. CBC to be followed up in am.   Leydi MARCOS

## 2020-10-21 NOTE — PROGRESS NOTE ADULT - ASSESSMENT
ASSESSMENT/PLAN:  This is a 70/F w/ RA, DM, HTN, fibromyalgia, hypothyroidism, brain aneurysm s/p repair BIBEMS for AMS.  Found in respiratory distress likely 2/2 multifocal PNA, intubated for hypoxemic respiratory failure.  Extubated in MICU w/o complication.  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  SHe is a vegetarian and gets her protein intake from nonanimal meats.  This could also be from hypotension that will lead to pre-renal azotemia(and decrease in ability to excrete free water)  Urine indices are not c/w SIADH     1 Rheum-At present no need for stress steroids   2 Renal- Increase Glucerna to tid and no salt restriction; The abx has a large salt load which will help;   SP [id number] and the serum sodium is improving   3 ID- On IV abx   4 CVS-Off the  lisinopril as the BP is soft   5. GI-Needs to eat more protein intake.  Her BUN is dropping     DC planning     Sayed Yoselin   [facility]  [phone]

## 2020-10-21 NOTE — PROGRESS NOTE ADULT - SUBJECTIVE AND OBJECTIVE BOX
CC: f/u for listeria bactermia    Patient reports: dyspnea, asking for oxygen, tolerating meropenem    REVIEW OF SYSTEMS:  All other review of systems negative (Comprehensive ROS)    Antimicrobials Day #  :day 14/21  trimethoprim  160 mG/sulfamethoxazole 800 mG 2 Tablet(s) Oral three times a day    Other Medications Reviewed  MEDICATIONS  (STANDING):  artificial  tears Solution 1 Drop(s) Both EYES two times a day  chlorhexidine 2% Cloths 1 Application(s) Topical daily  cycloSPORINE (RESTASIS) 0.05% Emulsion 1 Drop(s) Both EYES two times a day  dextrose 5%. 1000 milliLiter(s) (50 mL/Hr) IV Continuous <Continuous>  dextrose 50% Injectable 12.5 Gram(s) IV Push once  dextrose 50% Injectable 25 Gram(s) IV Push once  dextrose 50% Injectable 25 Gram(s) IV Push once  fondaparinux Injectable 2.5 milliGRAM(s) SubCutaneous daily  insulin lispro (ADMELOG) corrective regimen sliding scale   SubCutaneous three times a day before meals  insulin lispro (ADMELOG) corrective regimen sliding scale   SubCutaneous at bedtime  levothyroxine Injectable 75 MICROGram(s) IV Push at bedtime  metoprolol succinate ER 25 milliGRAM(s) Oral daily  polyethylene glycol 3350 17 Gram(s) Oral daily  prednisoLONE acetate 1% Suspension 1 Drop(s) Both EYES two times a day  predniSONE   Tablet 5 milliGRAM(s) Oral daily  senna 2 Tablet(s) Oral at bedtime  trimethoprim  160 mG/sulfamethoxazole 800 mG 2 Tablet(s) Oral three times a day    T(F): 97.7 (10-21-20 @ 13:02), Max: 98.3 (10-20-20 @ 20:50)  HR: 81 (10-21-20 @ 13:02)  BP: 146/67 (10-21-20 @ 13:02)  RR: 19 (10-21-20 @ 13:02)  SpO2: 97% (10-21-20 @ 13:02)  Wt(kg): --    PHYSICAL EXAM:  General: alert, no acute distress, frail  Eyes:  anicteric, no conjunctival injection, no discharge  Oropharynx: no lesions or injection 	  Neck: supple, without adenopathy  Lungs: clear to auscultation  Heart: regular rate and rhythm; no murmur, rubs or gallops  Abdomen: soft, nondistended, nontender, without mass or organomegaly  Skin: faint reticular rash  Extremities: no clubbing, cyanosis, or edema  Neurologic: alert, oriented, moves all extremities    LAB RESULTS:                        7.2    3.75  )-----------( 111      ( 21 Oct 2020 07:06 )             24.9     10-21    132<L>  |  101  |  4<L>  ----------------------------<  83  3.9   |  22  |  <0.30<L>    Ca    8.2<L>      21 Oct 2020 07:06    TPro  5.8<L>  /  Alb  2.8<L>  /  TBili  0.3  /  DBili  x   /  AST  24  /  ALT  18  /  AlkPhos  54  10-21    LIVER FUNCTIONS - ( 21 Oct 2020 07:06 )  Alb: 2.8 g/dL / Pro: 5.8 g/dL / ALK PHOS: 54 U/L / ALT: 18 U/L / AST: 24 U/L / GGT: x             MICROBIOLOGY:  RECENT CULTURES:  10-17 @ 18:24 .Blood Blood-Peripheral     No growth to date.          RADIOLOGY REVIEWED:  ad  < from: Xray Chest 1 View- PORTABLE-Urgent (Xray Chest 1 View- PORTABLE-Urgent .) (10.19.20 @ 18:08) >  INTERPRETATION:  CLINICAL INFORMATION: PICC placement.    EXAM: Frontal radiograph of the chest.    COMPARISON: Chest radiograph from 10/14/2020    FINDINGS:    Limited evaluation secondary to patient positioning. There is a right-sided PICC with the tip in the right atrium.    Cardiac size cannot be determined on this AP projection.    Unchanged small left pleural effusion with left lung base atelectasis.    Embolization clips seen in the upper abdomen.    Degenerative changes of the spine.      IMPRESSION: Right-sided PICC with the tip in the right atrium.  These findings were discussed with PRITI GARCIA  at 10/20/2020 10:14 AM by Dr. Russell with read back confirmation.    < end of copied text >

## 2020-10-22 ENCOUNTER — TRANSCRIPTION ENCOUNTER (OUTPATIENT)
Age: 70
End: 2020-10-22

## 2020-10-22 DIAGNOSIS — D64.9 ANEMIA, UNSPECIFIED: ICD-10-CM

## 2020-10-22 LAB
ANION GAP SERPL CALC-SCNC: 8 MMOL/L — SIGNIFICANT CHANGE UP (ref 5–17)
BUN SERPL-MCNC: 5 MG/DL — LOW (ref 7–23)
CALCIUM SERPL-MCNC: 8.2 MG/DL — LOW (ref 8.4–10.5)
CHLORIDE SERPL-SCNC: 101 MMOL/L — SIGNIFICANT CHANGE UP (ref 96–108)
CO2 SERPL-SCNC: 23 MMOL/L — SIGNIFICANT CHANGE UP (ref 22–31)
CREAT SERPL-MCNC: <0.3 MG/DL — LOW (ref 0.5–1.3)
CULTURE RESULTS: SIGNIFICANT CHANGE UP
CULTURE RESULTS: SIGNIFICANT CHANGE UP
GLUCOSE BLDC GLUCOMTR-MCNC: 110 MG/DL — HIGH (ref 70–99)
GLUCOSE BLDC GLUCOMTR-MCNC: 113 MG/DL — HIGH (ref 70–99)
GLUCOSE BLDC GLUCOMTR-MCNC: 126 MG/DL — HIGH (ref 70–99)
GLUCOSE BLDC GLUCOMTR-MCNC: 173 MG/DL — HIGH (ref 70–99)
GLUCOSE SERPL-MCNC: 80 MG/DL — SIGNIFICANT CHANGE UP (ref 70–99)
HAPTOGLOB SERPL-MCNC: 185 MG/DL — SIGNIFICANT CHANGE UP (ref 34–200)
HCT VFR BLD CALC: 30.8 % — LOW (ref 34.5–45)
HGB BLD-MCNC: 9.6 G/DL — LOW (ref 11.5–15.5)
IRON SATN MFR SERPL: 18 % — SIGNIFICANT CHANGE UP (ref 14–50)
IRON SATN MFR SERPL: 42 UG/DL — SIGNIFICANT CHANGE UP (ref 30–160)
MCHC RBC-ENTMCNC: 25.8 PG — LOW (ref 27–34)
MCHC RBC-ENTMCNC: 31.2 GM/DL — LOW (ref 32–36)
MCV RBC AUTO: 82.8 FL — SIGNIFICANT CHANGE UP (ref 80–100)
NRBC # BLD: 2 /100 WBCS — HIGH (ref 0–0)
PLATELET # BLD AUTO: 119 K/UL — LOW (ref 150–400)
POTASSIUM SERPL-MCNC: 4.3 MMOL/L — SIGNIFICANT CHANGE UP (ref 3.5–5.3)
POTASSIUM SERPL-SCNC: 4.3 MMOL/L — SIGNIFICANT CHANGE UP (ref 3.5–5.3)
RBC # BLD: 3.72 M/UL — LOW (ref 3.8–5.2)
RBC # FLD: 15.4 % — HIGH (ref 10.3–14.5)
SODIUM SERPL-SCNC: 132 MMOL/L — LOW (ref 135–145)
SPECIMEN SOURCE: SIGNIFICANT CHANGE UP
SPECIMEN SOURCE: SIGNIFICANT CHANGE UP
TIBC SERPL-MCNC: 229 UG/DL — SIGNIFICANT CHANGE UP (ref 220–430)
UIBC SERPL-MCNC: 187 UG/DL — SIGNIFICANT CHANGE UP (ref 110–370)
WBC # BLD: 5.39 K/UL — SIGNIFICANT CHANGE UP (ref 3.8–10.5)
WBC # FLD AUTO: 5.39 K/UL — SIGNIFICANT CHANGE UP (ref 3.8–10.5)

## 2020-10-22 PROCEDURE — 74018 RADEX ABDOMEN 1 VIEW: CPT | Mod: 26

## 2020-10-22 PROCEDURE — 70553 MRI BRAIN STEM W/O & W/DYE: CPT | Mod: 26

## 2020-10-22 PROCEDURE — 86077 PHYS BLOOD BANK SERV XMATCH: CPT

## 2020-10-22 RX ORDER — LEVOTHYROXINE SODIUM 125 MCG
150 TABLET ORAL DAILY
Refills: 0 | Status: DISCONTINUED | OUTPATIENT
Start: 2020-10-23 | End: 2020-10-23

## 2020-10-22 RX ORDER — PANTOPRAZOLE SODIUM 20 MG/1
40 TABLET, DELAYED RELEASE ORAL
Refills: 0 | Status: DISCONTINUED | OUTPATIENT
Start: 2020-10-22 | End: 2020-10-23

## 2020-10-22 RX ADMIN — POLYETHYLENE GLYCOL 3350 17 GRAM(S): 17 POWDER, FOR SOLUTION ORAL at 12:56

## 2020-10-22 RX ADMIN — Medication 2 TABLET(S): at 18:53

## 2020-10-22 RX ADMIN — Medication 1 DROP(S): at 05:11

## 2020-10-22 RX ADMIN — FONDAPARINUX SODIUM 2.5 MILLIGRAM(S): 2.5 INJECTION, SOLUTION SUBCUTANEOUS at 12:56

## 2020-10-22 RX ADMIN — Medication 1 DROP(S): at 18:53

## 2020-10-22 RX ADMIN — Medication 1: at 09:43

## 2020-10-22 RX ADMIN — Medication 650 MILLIGRAM(S): at 10:40

## 2020-10-22 RX ADMIN — Medication 5 MILLIGRAM(S): at 05:11

## 2020-10-22 RX ADMIN — CHLORHEXIDINE GLUCONATE 1 APPLICATION(S): 213 SOLUTION TOPICAL at 12:57

## 2020-10-22 RX ADMIN — SIMETHICONE 80 MILLIGRAM(S): 80 TABLET, CHEWABLE ORAL at 10:40

## 2020-10-22 RX ADMIN — Medication 1 DROP(S): at 18:52

## 2020-10-22 RX ADMIN — PANTOPRAZOLE SODIUM 40 MILLIGRAM(S): 20 TABLET, DELAYED RELEASE ORAL at 12:54

## 2020-10-22 RX ADMIN — Medication 650 MILLIGRAM(S): at 11:00

## 2020-10-22 RX ADMIN — SENNA PLUS 2 TABLET(S): 8.6 TABLET ORAL at 21:06

## 2020-10-22 RX ADMIN — CYCLOSPORINE 1 DROP(S): 0.5 EMULSION OPHTHALMIC at 09:43

## 2020-10-22 RX ADMIN — Medication 2 TABLET(S): at 05:12

## 2020-10-22 RX ADMIN — SIMETHICONE 80 MILLIGRAM(S): 80 TABLET, CHEWABLE ORAL at 19:25

## 2020-10-22 RX ADMIN — CYCLOSPORINE 1 DROP(S): 0.5 EMULSION OPHTHALMIC at 18:52

## 2020-10-22 RX ADMIN — Medication 650 MILLIGRAM(S): at 21:05

## 2020-10-22 RX ADMIN — Medication 25 MILLIGRAM(S): at 05:11

## 2020-10-22 NOTE — PROGRESS NOTE ADULT - ASSESSMENT
71 yo F with history of RA, DM, HTN, fibromyalgia, hypothyroidism, brain aneurysm s/p repair (2004?) BIBEMS for shortness of breath and fever. Per EMS, patient has had 3 days of fever, Tmax 102.5 , no known COVID exposures, took tylenol at unknown time before being brought in. patient didn't take her med for 2 days prior to admission. Patient c/o feeling fatigued, short of breath. Denies chest pain. Patient uses a wheelchair at baseline 2/2 arthritis. Intubated and in ICU extubated on 10/9 then transferred to floor.  Neurology called for dizzy sensation and h/o aneurysm.  Na improving 129-->130 today, Hgb also improving. 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 CTH obtained unremarkable. TTE with LVH, A1c7.3%  o/e proximal weakness> distal. Dizziness may be multifactorial 2/2 anemia and hyponatremia. dizziness now improved. s/p 1 unit PRBC for drop in Hgb.    - dizziness improved no need for MRI brain.   - hyponatremia slow correction, f/u renal, Na now 132.   - pending NST per cardio can be outpatient?  - new macular rash, drug reaction? improved, on meropenum will be switching to bactrim  - PT/OT, OOBC  - check FS, glucose control <180  - GI/DVT ppx  - Counseling on diet, exercise, and medication adherence was done  - Counseling on smoking cessation and alcohol consumption offered when appropriate.  - Pain assessed and judicious use of narcotics when appropriate was discussed.    - Stroke education given when appropriate.  - Importance of fall prevention discussed.   - Differential diagnosis and plan of care discussed with patient and/or family and primary team  - Thank you for allowing me to participate in the care of this patient. Call with questions.   - dispo planning  with home care  Dez Madera MD  Vascular Neurology  Office: [phone]

## 2020-10-22 NOTE — PROGRESS NOTE ADULT - ASSESSMENT
ASSESSMENT/PLAN:  This is a 70/F w/ RA, DM, HTN, fibromyalgia, hypothyroidism, brain aneurysm s/p repair BIBEMS for AMS.  Found in respiratory distress likely 2/2 multifocal PNA, intubated for hypoxemic respiratory failure.  Extubated in MICU w/o complication.  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  SHe is a vegetarian and gets her protein intake from nonanimal meats.  This could also be from hypotension that will lead to pre-renal azotemia(and decrease in ability to excrete free water)  Urine indices are not c/w SIADH     1 Rheum-At present no need for stress steroids   2 Renal-  Glucerna to tid and no salt restriction; The abx has a large salt load which will help;   SP [id number] and the serum sodium is improving   3 ID- On PO abx   4 CVS-Off the  lisinopril and can likely restart this again soon   5. GI-She states abd pain and sp blood yesterday.  The pt may need GI eval       Sayed Yoselin   [facility]  [phone]

## 2020-10-22 NOTE — PROGRESS NOTE ADULT - ASSESSMENT
72 yo female with RA, DM, HTN, fibromyalgia, immune suppressed at home on Enbrel and 5 mg of prednisone bid  and remote repair of cerebral aneurysm now admitted with fever, weakness and hypoxia.  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  Required vent & pressors support in ICU for shock - shock resolved.   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  She recalls a rash to PCN, therefor was started  on meropenem which she appeared  to be tolerating  RVP is negative, MRSA screen positive.  TTE with mild AS, no vegetations.  Repeat 10/9 blood cultures are NG to date  She had developed pancytopenia, now improved.  S/p RUE PICC placement   Transient fever of 100.4 F on 10/14 - was pancultured  CTA chest neg for PE, CTH no acute event  Urine cx no growth   Recurrent fever of 100.2F on 10/17/20   she has been afebrile since 10/17    Repeat blood cx so far no growth   WBC is normal, she is anemic  Creatine is stable   meropenem switched to bactrim yesterday with rash  she is tolerating bactrim so far  Her anemia and thrombocytopenia and leukopenia predated bactrim.    Suggest:  D # 15 of 21 of antibiotics  she was receiving Merrem  continue oral bactrim, 2 ds tabs po BID as ordered   reviewed with the  at the bedside , antibiotic treatment duration and route

## 2020-10-22 NOTE — PROGRESS NOTE ADULT - SUBJECTIVE AND OBJECTIVE BOX
Follow-up Pulm Progress Note    S/p 1UPRBC yesterday for downtrending H&H  No c/o dyspnea  Sats 97% RA    Medications:  MEDICATIONS  (STANDING):  artificial  tears Solution 1 Drop(s) Both EYES two times a day  chlorhexidine 2% Cloths 1 Application(s) Topical daily  cycloSPORINE (RESTASIS) 0.05% Emulsion 1 Drop(s) Both EYES two times a day  dextrose 5%. 1000 milliLiter(s) (50 mL/Hr) IV Continuous <Continuous>  dextrose 50% Injectable 12.5 Gram(s) IV Push once  dextrose 50% Injectable 25 Gram(s) IV Push once  dextrose 50% Injectable 25 Gram(s) IV Push once  fondaparinux Injectable 2.5 milliGRAM(s) SubCutaneous daily  insulin lispro (ADMELOG) corrective regimen sliding scale   SubCutaneous three times a day before meals  insulin lispro (ADMELOG) corrective regimen sliding scale   SubCutaneous at bedtime  levothyroxine Injectable 75 MICROGram(s) IV Push at bedtime  metoprolol succinate ER 25 milliGRAM(s) Oral daily  polyethylene glycol 3350 17 Gram(s) Oral daily  prednisoLONE acetate 1% Suspension 1 Drop(s) Both EYES two times a day  predniSONE   Tablet 5 milliGRAM(s) Oral daily  senna 2 Tablet(s) Oral at bedtime  trimethoprim  160 mG/sulfamethoxazole 800 mG 2 Tablet(s) Oral two times a day    MEDICATIONS  (PRN):  acetaminophen   Tablet .. 650 milliGRAM(s) Oral every 6 hours PRN Mild Pain (1 - 3)  dextrose 40% Gel 15 Gram(s) Oral once PRN Blood Glucose LESS THAN 70 milliGRAM(s)/deciliter  glucagon  Injectable 1 milliGRAM(s) IntraMuscular once PRN Glucose LESS THAN 70 milligrams/deciliter  hydrALAZINE Injectable 5 milliGRAM(s) IV Push every 6 hours PRN SBP >170  simethicone 80 milliGRAM(s) Chew three times a day PRN Gas  traMADol 50 milliGRAM(s) Oral two times a day PRN Moderate Pain (4 - 6)/Moderate Pain (4 - 6)          Vital Signs Last 24 Hrs  T(C): 36.3 (22 Oct 2020 04:55), Max: 36.8 (21 Oct 2020 22:50)  T(F): 97.4 (22 Oct 2020 04:55), Max: 98.3 (21 Oct 2020 22:50)  HR: 76 (22 Oct 2020 04:55) (68 - 89)  BP: 134/77 (22 Oct 2020 04:55) (122/57 - 152/81)  BP(mean): --  RR: 18 (22 Oct 2020 04:55) (18 - 19)  SpO2: 98% (22 Oct 2020 04:55) (97% - 99%)          10-21 @ 07:01  -  10-22 @ 07:00  --------------------------------------------------------  IN: 795 mL / OUT: 500 mL / NET: 295 mL          LABS:                        9.6    5.39  )-----------( 119      ( 22 Oct 2020 04:11 )             30.8     10-22    132<L>  |  101  |  5<L>  ----------------------------<  80  4.3   |  23  |  <0.30<L>    Ca    8.2<L>      22 Oct 2020 05:12    TPro  5.8<L>  /  Alb  2.8<L>  /  TBili  0.3  /  DBili  x   /  AST  24  /  ALT  18  /  AlkPhos  54  10-21          CAPILLARY BLOOD GLUCOSE      POCT Blood Glucose.: 173 mg/dL (22 Oct 2020 09:37)          CULTURES:     Culture - Blood (collected 10-17-20 @ 18:24)  Source: .Blood Blood-Peripheral  Preliminary Report (10-18-20 @ 19:02):    No growth to date.    Culture - Blood (collected 10-17-20 @ 18:24)  Source: .Blood Blood-Peripheral  Preliminary Report (10-18-20 @ 19:02):    No growth to date.    Culture - Blood (collected 10-14-20 @ 17:44)  Source: .Blood Blood  Final Report (10-19-20 @ 18:00):    No Growth Final    Culture - Blood (collected 10-14-20 @ 17:44)  Source: .Blood Blood  Final Report (10-19-20 @ 18:00):    No Growth Final        Culture - Urine (collected 10-14-20 @ 18:50)  Source: .Urine Catheterized  Final Report (10-15-20 @ 17:01):    No growth            Physical Examination:  PULM: Clear to auscultation bilaterally, no significant sputum production  CVS: RRR    RADIOLOGY REVIEWED  CT chest: < from: CT Angio Chest w/ IV Cont (10.14.20 @ 21:30) >  FINDINGS:    LUNGS AND AIRWAYS: Low lung volumes. Elevated diaphragm. Interval improvement in bilateral posterior and dependent consolidations previously seen on chest CT from 10/7/2020.  PLEURA: Bilateral small left and trace right pleural effusions.  MEDIASTINUM AND ROSEMARY: No lymphadenopathy.  VESSELS: The main pulmonary artery measures 2.1 cm. There is no main, central, lobar, or segmental pulmonary embolus. Right upper extremity PICC with tip terminating in the right atrium. Calcified and noncalcified ulcerative plaque involving the aorta.  HEART: Heart size is normal. No pericardial effusion. Coronary arterial calcifications. No signs of right heart strain.  CHEST WALL AND LOWER NECK: Within normal limits.  VISUALIZED UPPER ABDOMEN: Embolization coil mass in the gastrohepatic space.  BONES: Degenerative changes.    IMPRESSION:    No main, lobar, or segmental pulmonary embolism. Limited evaluation the subsegmental pulmonary arteries due to respiratory motion.    Improvement in bilateral atelectasis seen on chest CT from 10/7/2020. Small left and trace right pleural effusions.    < end of copied text >

## 2020-10-22 NOTE — PROGRESS NOTE ADULT - SUBJECTIVE AND OBJECTIVE BOX
NEPHROLOGY-[facility] [phone]        Patient seen and examined in bed.  She got blood yesterday and had abd pain.  She states that she still has abd pain         MEDICATIONS  (STANDING):  artificial  tears Solution 1 Drop(s) Both EYES two times a day  chlorhexidine 2% Cloths 1 Application(s) Topical daily  cycloSPORINE (RESTASIS) 0.05% Emulsion 1 Drop(s) Both EYES two times a day  dextrose 5%. 1000 milliLiter(s) (50 mL/Hr) IV Continuous <Continuous>  dextrose 50% Injectable 12.5 Gram(s) IV Push once  dextrose 50% Injectable 25 Gram(s) IV Push once  dextrose 50% Injectable 25 Gram(s) IV Push once  fondaparinux Injectable 2.5 milliGRAM(s) SubCutaneous daily  insulin lispro (ADMELOG) corrective regimen sliding scale   SubCutaneous three times a day before meals  insulin lispro (ADMELOG) corrective regimen sliding scale   SubCutaneous at bedtime  metoprolol succinate ER 25 milliGRAM(s) Oral daily  polyethylene glycol 3350 17 Gram(s) Oral daily  prednisoLONE acetate 1% Suspension 1 Drop(s) Both EYES two times a day  predniSONE   Tablet 5 milliGRAM(s) Oral daily  senna 2 Tablet(s) Oral at bedtime  trimethoprim  160 mG/sulfamethoxazole 800 mG 2 Tablet(s) Oral two times a day      VITAL:  T(C): , Max: 36.8 (10-21-20 @ 22:50)  T(F): , Max: 98.3 (10-21-20 @ 22:50)  HR: 76 (10-22-20 @ 04:55)  BP: 134/77 (10-22-20 @ 04:55)  BP(mean): --  RR: 18 (10-22-20 @ 04:55)  SpO2: 98% (10-22-20 @ 04:55)  Wt(kg): --    I and O's:    10-21 @ 07:01  -  10-22 @ 07:00  --------------------------------------------------------  IN: 795 mL / OUT: 500 mL / NET: 295 mL    10-22 @ 07:01  -  10-22 @ 11:20  --------------------------------------------------------  IN: 120 mL / OUT: 0 mL / NET: 120 mL          PHYSICAL EXAM:    Constitutional: NAD  Neck:  No JVD  Respiratory: CTAB/L  Cardiovascular: S1 and S2  Gastrointestinal: BS+, soft, NT?/ ND  Extremities: No peripheral edema  Neurological: A/O x 3, no focal deficits  Psychiatric: Normal mood, normal affect  : No Mcbride  Skin: No rashes  Access: PICC    LABS:                        9.6    5.39  )-----------( 119      ( 22 Oct 2020 04:11 )             30.8     10-22    132<L>  |  101  |  5<L>  ----------------------------<  80  4.3   |  23  |  <0.30<L>    Ca    8.2<L>      22 Oct 2020 05:12    TPro  5.8<L>  /  Alb  2.8<L>  /  TBili  0.3  /  DBili  x   /  AST  24  /  ALT  18  /  AlkPhos  54  10-21          Urine Studies:          RADIOLOGY & ADDITIONAL STUDIES:

## 2020-10-22 NOTE — PROGRESS NOTE ADULT - SUBJECTIVE AND OBJECTIVE BOX
Neurology Progress Note    S: Patient seen and examined. son at bedside.  s/p 1 unit PRBC for drop in Hgb.  wants to go home     Medication:  artificial  tears Solution 1 Drop(s) Both EYES two times a day  chlorhexidine 2% Cloths 1 Application(s) Topical daily  cycloSPORINE (RESTASIS) 0.05% Emulsion 1 Drop(s) Both EYES two times a day  dextrose 40% Gel 15 Gram(s) Oral once PRN  dextrose 5%. 1000 milliLiter(s) IV Continuous <Continuous>  dextrose 50% Injectable 12.5 Gram(s) IV Push once  dextrose 50% Injectable 25 Gram(s) IV Push once  dextrose 50% Injectable 25 Gram(s) IV Push once  fondaparinux Injectable 2.5 milliGRAM(s) SubCutaneous daily  glucagon  Injectable 1 milliGRAM(s) IntraMuscular once PRN  hydrALAZINE Injectable 5 milliGRAM(s) IV Push every 6 hours PRN  insulin lispro (HumaLOG) corrective regimen sliding scale   SubCutaneous three times a day before meals  insulin lispro (HumaLOG) corrective regimen sliding scale   SubCutaneous at bedtime  levothyroxine Injectable 75 MICROGram(s) IV Push at bedtime  lisinopril 10 milliGRAM(s) Oral daily  meropenem  IVPB 2000 milliGRAM(s) IV Intermittent every 8 hours  polyethylene glycol 3350 17 Gram(s) Oral daily  prednisoLONE acetate 1% Suspension 1 Drop(s) Both EYES two times a day  predniSONE   Tablet 5 milliGRAM(s) Oral daily  senna 2 Tablet(s) Oral at bedtime  simethicone 80 milliGRAM(s) Chew three times a day PRN      Vitals:  Vital Signs Last 24 Hrs  T(C): 36.8 (16 Oct 2020 05:01), Max: 37.1 (15 Oct 2020 20:54)  T(F): 98.3 (16 Oct 2020 05:01), Max: 98.7 (15 Oct 2020 20:54)  HR: 102 (16 Oct 2020 05:01) (70 - 102)  BP: 149/84 (16 Oct 2020 05:21) (100/64 - 149/84)  BP(mean): --  RR: 18 (16 Oct 2020 05:21) (18 - 18)  SpO2: 98% (16 Oct 2020 05:21) (96% - 98%)    General Exam:   General Appearance: Appropriately dressed and in no acute distress       Head: Normocephalic, atraumatic and no dysmorphic features  Ear, Nose, and Throat: Moist mucous membranes  CVS: S1S2+  Resp: mild SOB but no wheeze  GI: soft NT/ND  Extremeties: mild LE edema no  cyanosis  Skin: some bruises from IV, LUE rash and macular rash on chest      Neurological Exam:  Mental Status: Awake, alert and oriented x 3.  Able to follow simple and complex verbal commands. Able to name and repeat. fluent speech. No obvious aphasia or dysarthria noted.   Cranial Nerves: PERRL, EOMI, VFFC, sensation V1-V3 intact,  no obvious facial assymetry, equal elevation of palate, scm/trap 5/5, tongue is midline on protrusion. no obvious papilledema on fundoscopic exam. hearing is grossly intact.   Motor: GAMA but proximally limited motion 1/5 in UE and LE proximally, but distally x 4 she is 4/5 (baseline?)   Sensation: Intact to light touch and pinprick throughout. no right/left confusion.   Reflexes: 1+ throughout at biceps, brachioradialis, triceps, patellars and ankles bilaterally and equal. No clonus. R toe and L toe were both downgoing.  Coordination: unable   Gait: wheelchair bound    I personally reviewed the below data/images/labs:      artificial  tears Solution 1 Drop(s) Both EYES two times a day  chlorhexidine 2% Cloths 1 Application(s) Topical daily  cycloSPORINE (RESTASIS) 0.05% Emulsion 1 Drop(s) Both EYES two times a day  dextrose 40% Gel 15 Gram(s) Oral once PRN  dextrose 5%. 1000 milliLiter(s) IV Continuous <Continuous>  dextrose 50% Injectable 12.5 Gram(s) IV Push once  dextrose 50% Injectable 25 Gram(s) IV Push once  dextrose 50% Injectable 25 Gram(s) IV Push once  fondaparinux Injectable 2.5 milliGRAM(s) SubCutaneous daily  glucagon  Injectable 1 milliGRAM(s) IntraMuscular once PRN  hydrALAZINE Injectable 5 milliGRAM(s) IV Push every 6 hours PRN  insulin lispro (HumaLOG) corrective regimen sliding scale   SubCutaneous three times a day before meals  insulin lispro (HumaLOG) corrective regimen sliding scale   SubCutaneous at bedtime  levothyroxine Injectable 75 MICROGram(s) IV Push at bedtime  lisinopril 10 milliGRAM(s) Oral daily  meropenem  IVPB 2000 milliGRAM(s) IV Intermittent every 8 hours  polyethylene glycol 3350 17 Gram(s) Oral daily  prednisoLONE acetate 1% Suspension 1 Drop(s) Both EYES two times a day  predniSONE   Tablet 5 milliGRAM(s) Oral daily  senna 2 Tablet(s) Oral at bedtime  simethicone 80 milliGRAM(s) Chew three times a day PRN    CBC Full  -  ( 16 Oct 2020 06:13 )  WBC Count : 6.96 K/uL  RBC Count : 3.55 M/uL  Hemoglobin : 8.7 g/dL  Hematocrit : 29.1 %  Platelet Count - Automated : 171 K/uL  Mean Cell Volume : 82.0 fl  Mean Cell Hemoglobin : 24.5 pg  Mean Cell Hemoglobin Concentration : 29.9 gm/dL  Auto Neutrophil # : x  Auto Lymphocyte # : x  Auto Monocyte # : x  Auto Eosinophil # : x  Auto Basophil # : x  Auto Neutrophil % : x  Auto Lymphocyte % : x  Auto Monocyte % : x  Auto Eosinophil % : x  Auto Basophil % : x    10-16    130<L>  |  97  |  8   ----------------------------<  124<H>  4.3   |  22  |  <0.30<L>    Ca    8.5      16 Oct 2020 06:13          Urinalysis Basic - ( 14 Oct 2020 15:24 )    Color: Light Yellow / Appearance: Clear / S.013 / pH: x  Gluc: x / Ketone: Trace  / Bili: Negative / Urobili: Negative   Blood: x / Protein: Trace / Nitrite: Negative   Leuk Esterase: Negative / RBC: 3 /hpf / WBC 1 /HPF   Sq Epi: x / Non Sq Epi: 2 /hpf / Bacteria: Negative            CTA chest: < from: CT Angio Chest w/ IV Cont (10.14.20 @ 21:30) >  FINDINGS:    LUNGS AND AIRWAYS: Low lung volumes. Elevated diaphragm. Interval improvement in bilateral posterior and dependent consolidations previously seen on chest CT from 10/7/2020.  PLEURA: Bilateral small left and trace right pleural effusions.  MEDIASTINUM AND ROSEMARY: No lymphadenopathy.  VESSELS: The main pulmonary artery measures 2.1 cm. There is no main, central, lobar, or segmental pulmonary embolus. Right upper extremity PICC with tip terminating in the right atrium. Calcified and noncalcified ulcerative plaque involving the aorta.  HEART: Heart size is normal. No pericardial effusion. Coronary arterial calcifications. No signs of right heart strain.  CHEST WALL AND LOWER NECK: Within normal limits.  VISUALIZED UPPER ABDOMEN: Embolization coil mass in the gastrohepatic space.  BONES: Degenerative changes.    IMPRESSION:    No main, lobar, or segmental pulmonary embolism. Limited evaluation the subsegmental pulmonary arteries due to respiratory motion.    Improvement in bilateral atelectasis seen on chest CT from 10/7/2020. Small left and trace right pleural effusions.      CTH 10/14/20  IMPRESSION: No acute intracranial hemorrhage, mass effect, or shift of the midline structures.    Mild interval advancement of chronic white matter microvascular type changes.    TTE: 1. Calcified trileaflet aortic valve with decreased  opening. Peak transaortic valve gradient equals 16 mm Hg,  mean transaortic valve gradient equals 8 mm Hg, estimated  aortic valve area equals 1.6 sqcm (by continuity equation),  aortic valve velocity time integral equals 28 cm,  consistent with mild aortic stenosis.  2. Increased relative wall thickness with normal left  ventricular mass index, consistent with concentric left  ventricular remodeling.  3. Normal left ventricular systolic function. No segmental  wall motion abnormalities.  4. Normal right ventricular size and function.  *** No previous Echo exam.  Unable to rule out endocarditis, consider JAIME if clinicaly  indicated.  Hgb A1c 7.3

## 2020-10-22 NOTE — PROGRESS NOTE ADULT - ATTENDING COMMENTS
CARDIOLOGY ATTENDING    Agree with above. Awaiting repeat echo. If echo abnormal then recommend cath, if echo unchanged (i.e. normal) then recommend NST
CARDIOLOGY ATTENDING    Agree with above. Repeat echo confirms normal LVEF (no wall motion abnormalities) with a hyperdynamic LV creating moderate LVOT obstruction from MANSOOR. Recommend beta blockers, avoiding dehydration, avoiding afterload reducers, and outpatient NST
Patient seen and examined, agree with above assessment and plan as transcribed above.    - F/U Echo if no pertinent findings plan for adenosine nuclear stress    Samuel Nicholson MD, [facility]  BEEPER [phone]
Patient seen and examined, agree with above assessment and plan as transcribed above.    - as suspected, repeat echo with LVOT obstruction   LIkely etiology of CP hypotension and mild trop elevation  - Stress can be done as an outpt    Samuel Nicholson MD, [facility]  BEEPER [phone]
seen and agree w/ PA. 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  outpatient followup with her cardiologist.
Pt seen and examined. 70 F with medical hx as noted above, a/w acute resp failure with hypoxia, bacterial PNA, listeria bacteremia and septic shock. Resp status stable post extubation. Bcxs cleared, remains on Meropenem. Off pressor support, hypertension improved with Labetalol TID. Abdominal pain improved, x-ray with non specific bowel gas pattern. Still passing gas, cont bowel regimen and monitor abd exam. Pancytopenia, ?medication related. If worsens will need a heme evalv. Full code.
Pt is a 69 yo F with h/o HTN, DM, hypothyroidism, RA, fibromyalgia and brain aneurysm s/p repair BIBEMS SOB and fever; pt found to be hypoxic and placed on 100% NRM. In the ER resp status deteriorated; pt placed on BiPAP and then with worsening MS, pt intubated. ICU admitting dx: 1) Acute hypoxic and hypercapnic resp failure requiring intubation 2 to multi-focal PNA 2) Hyponatremia     Resp: Given initial SBT this am required to placed back on A/C/ May need to decrease pt's TV and RR  ID: F/u Cx and cover for CAP  CVS: Hold pt's antiHTN meds  FEN: If not extubated today, start enteral feeds/ Replace Phos: pt hypophosphatemic/ Follow Na   Endo: Follow FS and cover with Lispro/ Cont Synthroid  Neuro: Cont light sedation
Pt is a 69 yo F with h/o HTN, DM, hypothyroidism, RA, fibromyalgia and brain aneurysm s/p repair BIBEMS SOB and fever; pt found to be hypoxic and placed on 100% NRM. In the ER resp status deteriorated; pt placed on BiPAP and then with worsening MS, pt intubated. ICU admitting dx: 1) Acute hypoxic and hypercapnic resp failure requiring intubation 2 to multi-focal PNA vs atelectasis in setting of Listeria sepsis 2) Hyponatremia    Resp: s/p extubation this am  ID: Cont Meropenem as per ID  CVS: May restart pt's antiHTN med/ May check Echo  FEN: Po diet later today if pt more awake/alert/ Follow Na   Endo: Follow FS and cover with Lispro/ Cont Synthroid  Rheum: May restart pt's standing Steroid dose
Pt seen and examined. 70 f with medical hx as noted above, a/w acute resp failure with hypoxia, bacterial PNA, listeria bacteremia and septic shock. Resp status stable post extubation. Bcxs cleared, remains on Meropenem. Off pressor support, now hypertensive, add Labetalol TID. C/O abdominal pain, mild distension on exam but passing gas. Serial abdominal exams, check abd x-ray. Full code.
Anemia: AOCD vs Fe deficiency. Labs ordered. Transfuse 1 u PRBC
Anemia: Prob from AOCD.   IF CBC is stable, DC home in AM
DC PLANNING WITH HOME CARE
DC PLANNING WITH HOME CARE
Dizziness improved, BP improved. Will get repeat Echo for elevated Trop. Cardiology evaluation noted. NST ordered.  Family notified  No need for brain MRI  Pharm stress test, TTE is pending  DC planning of WNL for home  Family is anxious ot bring her home. She is functionally quadriplegic and dependent on family for all ADLs
Dizziness improved, BP improved. Will get repeat Echo for elevated Trop. Cardiology evaluation was called yesterday
FU TFTs
FU TFTs
[facility] cardiology Dr Villatoro  DC Stress test  LVOT obstruction noted on Echo, nopt clinically significant  DC Home in AM
[facility] cardiology Dr Vlilatoro  DC Stress test  LVOT obstruction noted on Echo, nopt clinically significant  DC planning

## 2020-10-22 NOTE — PROGRESS NOTE ADULT - SUBJECTIVE AND OBJECTIVE BOX
CC: f/u for listeria bacteremia, bacteremia has resolved she is completing her course of antibiotics as planned    Patient is awake and alert in bed, she is wondering when she is going home     REVIEW OF SYSTEMS:  All other review of systems negative (Comprehensive ROS)    Antimicrobials Day #  :day 15/21  trimethoprim  160 mG/sulfamethoxazole 800 mG 2 Tablet(s) Oral three times a day    Other Medications Reviewed  MEDICATIONS  (STANDING):  artificial  tears Solution 1 Drop(s) Both EYES two times a day  chlorhexidine 2% Cloths 1 Application(s) Topical daily  cycloSPORINE (RESTASIS) 0.05% Emulsion 1 Drop(s) Both EYES two times a day  dextrose 5%. 1000 milliLiter(s) (50 mL/Hr) IV Continuous <Continuous>  dextrose 50% Injectable 12.5 Gram(s) IV Push once  dextrose 50% Injectable 25 Gram(s) IV Push once  dextrose 50% Injectable 25 Gram(s) IV Push once  fondaparinux Injectable 2.5 milliGRAM(s) SubCutaneous daily  insulin lispro (ADMELOG) corrective regimen sliding scale   SubCutaneous three times a day before meals  insulin lispro (ADMELOG) corrective regimen sliding scale   SubCutaneous at bedtime  levothyroxine Injectable 75 MICROGram(s) IV Push at bedtime  metoprolol succinate ER 25 milliGRAM(s) Oral daily  polyethylene glycol 3350 17 Gram(s) Oral daily  prednisoLONE acetate 1% Suspension 1 Drop(s) Both EYES two times a day  predniSONE   Tablet 5 milliGRAM(s) Oral daily  senna 2 Tablet(s) Oral at bedtime  trimethoprim  160 mG/sulfamethoxazole 800 mG 2 Tablet(s) Oral three times a day    Vital Signs Last 24 Hrs  T(C): 36.8 (22 Oct 2020 14:31), Max: 36.8 (21 Oct 2020 22:50)  T(F): 98.2 (22 Oct 2020 14:31), Max: 98.3 (21 Oct 2020 22:50)  HR: 77 (22 Oct 2020 14:31) (68 - 89)  BP: 167/77 (22 Oct 2020 14:31) (122/57 - 167/77)  BP(mean): --  RR: 19 (22 Oct 2020 14:31) (18 - 19)  SpO2: 98% (22 Oct 2020 14:31) (98% - 99%)    PHYSICAL EXAM:  General: alert, no acute distress, frail  Eyes:  anicteric, no conjunctival injection, no discharge  Oropharynx: no lesions or injection 	  Neck: supple, without adenopathy  Lungs: clear to auscultation  Heart: regular rate and rhythm; no murmur, rubs or gallops  Abdomen: soft, nondistended, nontender, without mass or organomegaly  Skin: faint reticular rash  Extremities: no clubbing, cyanosis, or edema  Neurologic: alert, oriented, moves all extremities    LAB RESULTS:                                   9.6    5.39  )-----------( 119      ( 22 Oct 2020 04:11 )             30.8                7.2    3.75  )-----------( 111      ( 21 Oct 2020 07:06 )             24.9                10-22    132<L>  |  101  |  5<L>  ----------------------------<  80  4.3   |  23  |  <0.30<L>    Ca    8.2<L>      22 Oct 2020 05:12    TPro  5.8<L>  /  Alb  2.8<L>  /  TBili  0.3  /  DBili  x   /  AST  24  /  ALT  18  /  AlkPhos  54  10-21      MICROBIOLOGY:  RECENT CULTURES:  10-17 @ 18:24 .Blood Blood-Peripheral     No growth to date.          RADIOLOGY REVIEWED:  ad  < from: Xray Chest 1 View- PORTABLE-Urgent (Xray Chest 1 View- PORTABLE-Urgent .) (10.19.20 @ 18:08) >  INTERPRETATION:  CLINICAL INFORMATION: PICC placement.    EXAM: Frontal radiograph of the chest.    COMPARISON: Chest radiograph from 10/14/2020    FINDINGS:    Limited evaluation secondary to patient positioning. There is a right-sided PICC with the tip in the right atrium.    Cardiac size cannot be determined on this AP projection.    Unchanged small left pleural effusion with left lung base atelectasis.    Embolization clips seen in the upper abdomen.    Degenerative changes of the spine.      IMPRESSION: Right-sided PICC with the tip in the right atrium.  These findings were discussed with PRITI GARCIA  at 10/20/2020 10:14 AM by Dr. Russell with read back confirmation.    < end of copied text >

## 2020-10-22 NOTE — PROGRESS NOTE ADULT - SUBJECTIVE AND OBJECTIVE BOX
S: c/o abd pain, denies chest pain or SOB. Review of systems otherwise (-)      MEDICATIONS  (STANDING):  artificial  tears Solution 1 Drop(s) Both EYES two times a day  chlorhexidine 2% Cloths 1 Application(s) Topical daily  cycloSPORINE (RESTASIS) 0.05% Emulsion 1 Drop(s) Both EYES two times a day  dextrose 5%. 1000 milliLiter(s) (50 mL/Hr) IV Continuous <Continuous>  dextrose 50% Injectable 12.5 Gram(s) IV Push once  dextrose 50% Injectable 25 Gram(s) IV Push once  dextrose 50% Injectable 25 Gram(s) IV Push once  fondaparinux Injectable 2.5 milliGRAM(s) SubCutaneous daily  insulin lispro (ADMELOG) corrective regimen sliding scale   SubCutaneous three times a day before meals  insulin lispro (ADMELOG) corrective regimen sliding scale   SubCutaneous at bedtime  metoprolol succinate ER 25 milliGRAM(s) Oral daily  pantoprazole    Tablet 40 milliGRAM(s) Oral before breakfast  polyethylene glycol 3350 17 Gram(s) Oral daily  prednisoLONE acetate 1% Suspension 1 Drop(s) Both EYES two times a day  predniSONE   Tablet 5 milliGRAM(s) Oral daily  senna 2 Tablet(s) Oral at bedtime  trimethoprim  160 mG/sulfamethoxazole 800 mG 2 Tablet(s) Oral two times a day    MEDICATIONS  (PRN):  acetaminophen   Tablet .. 650 milliGRAM(s) Oral every 6 hours PRN Mild Pain (1 - 3)  dextrose 40% Gel 15 Gram(s) Oral once PRN Blood Glucose LESS THAN 70 milliGRAM(s)/deciliter  glucagon  Injectable 1 milliGRAM(s) IntraMuscular once PRN Glucose LESS THAN 70 milligrams/deciliter  hydrALAZINE Injectable 5 milliGRAM(s) IV Push every 6 hours PRN SBP >170  simethicone 80 milliGRAM(s) Chew three times a day PRN Gas  traMADol 50 milliGRAM(s) Oral two times a day PRN Moderate Pain (4 - 6)/Moderate Pain (4 - 6)      LABS:                            9.6    5.39  )-----------( 119      ( 22 Oct 2020 04:11 )             30.8     Hemoglobin: 9.6 g/dL (10-22 @ 04:11)  Hemoglobin: 7.2 g/dL (10-21 @ 07:06)  Hemoglobin: 7.4 g/dL (10-20 @ 14:58)  Hemoglobin: 8.3 g/dL (10-19 @ 05:05)    10-22    132<L>  |  101  |  5<L>  ----------------------------<  80  4.3   |  23  |  <0.30<L>    Ca    8.2<L>      22 Oct 2020 05:12    TPro  5.8<L>  /  Alb  2.8<L>  /  TBili  0.3  /  DBili  x   /  AST  24  /  ALT  18  /  AlkPhos  54  10-21    Creatinine Trend: <0.30<--, <0.30<--, <0.30<--, <0.30<--, <0.30<--, <0.30<--             10-21-20 @ 07:01  -  10-22-20 @ 07:00  --------------------------------------------------------  IN: 795 mL / OUT: 500 mL / NET: 295 mL    10-22-20 @ 07:01  -  10-22-20 @ 14:17  --------------------------------------------------------  IN: 120 mL / OUT: 0 mL / NET: 120 mL        PHYSICAL EXAM  Vital Signs Last 24 Hrs  T(C): 36.3 (22 Oct 2020 04:55), Max: 36.8 (21 Oct 2020 22:50)  T(F): 97.4 (22 Oct 2020 04:55), Max: 98.3 (21 Oct 2020 22:50)  HR: 76 (22 Oct 2020 04:55) (68 - 89)  BP: 134/77 (22 Oct 2020 04:55) (122/57 - 152/81)  BP(mean): --  RR: 18 (22 Oct 2020 04:55) (18 - 18)  SpO2: 98% (22 Oct 2020 04:55) (98% - 99%)    Gen: Appears well in NAD  HEENT:  (-)icterus (-)pallor  CV: N S1 S2 1/6 JUAN (+)2 Pulses B/l  Resp:  Clear to auscultation B/L, normal effort  GI: (+) BS Soft, NT, ND  Lymph:  (-)Edema, (-)obvious lymphadenopathy  Skin: Warm to touch, Normal turgor  Psych: Appropriate mood and affect      ASSESSMENT/PLAN: 70y Female  history of RA, DM, HTN, fibromyalgia, hypothyroidism, brain aneurysm s/p repair BIBEMS for shortness of breath and fever PNA with an episode of hypotension and (+) Trop.    - Abd pain workup per primary team  - Repeat echo confirms normal LVEF (no wall motion abnormalities) with a hyperdynamic LV creating moderate LVOT obstruction from MANSOOR.   - Recommend beta blockers, avoiding dehydration, avoiding afterload reducers - continue Toprol XL 25mg daily, lisinopril d/marli  - Recommend outpatient NST  - No further inpatient cardiac w/u needed  - Patient to f/u with her own cardiologist Dr. Woods after d/c    Lee Encinas PA-C  Pager: [phone]

## 2020-10-22 NOTE — PROGRESS NOTE ADULT - SUBJECTIVE AND OBJECTIVE BOX
Patient is a 70y old  Female who presents with a chief complaint of SOB (09 Oct 2020 12:09)      SUBJECTIVE / OVERNIGHT EVENTS:  Macular generalized rash improved.  Noted to be anemic, S/p transfusion    Review of Systems:   MEDICATIONS  (STANDING):  artificial  tears Solution 1 Drop(s) Both EYES two times a day  cycloSPORINE (RESTASIS) 0.05% Emulsion 1 Drop(s) Both EYES two times a day  dextrose 5%. 1000 milliLiter(s) (50 mL/Hr) IV Continuous <Continuous>  dextrose 50% Injectable 12.5 Gram(s) IV Push once  dextrose 50% Injectable 25 Gram(s) IV Push once  dextrose 50% Injectable 25 Gram(s) IV Push once  insulin lispro (HumaLOG) corrective regimen sliding scale   SubCutaneous every 6 hours  levothyroxine Injectable 50 MICROGram(s) IV Push at bedtime  lisinopril 20 milliGRAM(s) Oral daily  meropenem  IVPB 2000 milliGRAM(s) IV Intermittent every 8 hours  methylPREDNISolone sodium succinate Injectable 8 milliGRAM(s) IV Push daily  potassium phosphate / sodium phosphate Powder (PHOS-NaK) 1 Packet(s) Oral two times a day before meals  prednisoLONE acetate 1% Suspension 1 Drop(s) Both EYES two times a day    MEDICATIONS  (PRN):  dextrose 40% Gel 15 Gram(s) Oral once PRN Blood Glucose LESS THAN 70 milliGRAM(s)/deciliter  glucagon  Injectable 1 milliGRAM(s) IntraMuscular once PRN Glucose LESS THAN 70 milligrams/deciliter  hydrALAZINE Injectable 5 milliGRAM(s) IV Push every 6 hours PRN SBP >170      PHYSICAL EXAM:  Vital Signs Last 24 Hrs  T(C): 36.6 (09 Oct 2020 20:00), Max: 37 (09 Oct 2020 04:00)  T(F): 97.9 (09 Oct 2020 20:00), Max: 98.6 (09 Oct 2020 04:00)  HR: 88 (09 Oct 2020 22:00) (69 - 124)  BP: 156/71 (09 Oct 2020 22:00) (116/58 - 210/99)  BP(mean): 102 (09 Oct 2020 22:00) (79 - 142)  RR: 16 (09 Oct 2020 22:00) (15 - 46)  SpO2: 98% (09 Oct 2020 22:00) (93% - 100%)  I&O's Summary    08 Oct 2020 07:01  -  09 Oct 2020 07:00  --------------------------------------------------------  IN: 491.4 mL / OUT: 640 mL / NET: -148.6 mL    09 Oct 2020 07:01  -  09 Oct 2020 22:55  --------------------------------------------------------  IN: 2670 mL / OUT: 300 mL / NET: 2370 mL    ROS:  Constipated  Bed bound  GENERAL:A & Ox3  HEAD:  Atraumatic, Normocephalic  EYES: EOMI, PERRLA, conjunctiva and sclera clear  NECK: Supple, No JVD  CHEST/LUNG: Clear to auscultation bilaterally; No wheeze  HEART: Regular rate and rhythm; No murmurs, rubs, or gallops  ABDOMEN: Soft, Nontender, Nondistended; Bowel sounds present  EXTREMITIES:  2+ Peripheral Pulses, No clubbing, cyanosis, or edema  PSYCH: AAOx3  NEUROLOGY: non-focal. Gen weakness, bed bound. Power in all extremities is   SKIN:generalized rash    LABS:  CAPILLARY BLOOD GLUCOSE      POCT Blood Glucose.: 239 mg/dL (09 Oct 2020 18:44)  POCT Blood Glucose.: 104 mg/dL (09 Oct 2020 05:11)  POCT Blood Glucose.: 151 mg/dL (08 Oct 2020 23:13)                          8.1    5.73  )-----------( 140      ( 09 Oct 2020 12:44 )             25.6     10-09    131<L>  |  101  |  10  ----------------------------<  96  3.6   |  16<L>  |  <0.30<L>    Ca    8.8      09 Oct 2020 12:44  Phos  2.1     10-09  Mg     1.6     10-09    TPro  6.0  /  Alb  2.9<L>  /  TBili  0.4  /  DBili  x   /  AST  38  /  ALT  57<H>  /  AlkPhos  41  10-09    PT/INR - ( 09 Oct 2020 00:21 )   PT: 13.6 sec;   INR: 1.14 ratio         PTT - ( 09 Oct 2020 00:21 )  PTT:30.2 sec          RADIOLOGY & ADDITIONAL TESTS:    Imaging Personally Reviewed:    Consultant(s) Notes Reviewed:      Care Discussed with Consultants/Other Providers:

## 2020-10-22 NOTE — PROGRESS NOTE ADULT - NSHPATTENDINGPLANDISCUSS_GEN_ALL_CORE
MICU team
MICU team
Patient , her brother in law, cardiologist Dr Richard Woods
Patient , her family
Patient , her family

## 2020-10-23 VITALS
RESPIRATION RATE: 18 BRPM | HEART RATE: 59 BPM | SYSTOLIC BLOOD PRESSURE: 116 MMHG | OXYGEN SATURATION: 98 % | DIASTOLIC BLOOD PRESSURE: 70 MMHG | TEMPERATURE: 97 F

## 2020-10-23 LAB
ANION GAP SERPL CALC-SCNC: 10 MMOL/L — SIGNIFICANT CHANGE UP (ref 5–17)
ANION GAP SERPL CALC-SCNC: 8 MMOL/L — SIGNIFICANT CHANGE UP (ref 5–17)
APTT BLD: 44.3 SEC — HIGH (ref 27.5–35.5)
BUN SERPL-MCNC: 8 MG/DL — SIGNIFICANT CHANGE UP (ref 7–23)
BUN SERPL-MCNC: 8 MG/DL — SIGNIFICANT CHANGE UP (ref 7–23)
CALCIUM SERPL-MCNC: 8.7 MG/DL — SIGNIFICANT CHANGE UP (ref 8.4–10.5)
CALCIUM SERPL-MCNC: 9 MG/DL — SIGNIFICANT CHANGE UP (ref 8.4–10.5)
CHLORIDE SERPL-SCNC: 96 MMOL/L — SIGNIFICANT CHANGE UP (ref 96–108)
CHLORIDE SERPL-SCNC: 96 MMOL/L — SIGNIFICANT CHANGE UP (ref 96–108)
CO2 SERPL-SCNC: 23 MMOL/L — SIGNIFICANT CHANGE UP (ref 22–31)
CO2 SERPL-SCNC: 23 MMOL/L — SIGNIFICANT CHANGE UP (ref 22–31)
CREAT SERPL-MCNC: <0.3 MG/DL — LOW (ref 0.5–1.3)
CREAT SERPL-MCNC: <0.3 MG/DL — LOW (ref 0.5–1.3)
GLUCOSE BLDC GLUCOMTR-MCNC: 175 MG/DL — HIGH (ref 70–99)
GLUCOSE BLDC GLUCOMTR-MCNC: 84 MG/DL — SIGNIFICANT CHANGE UP (ref 70–99)
GLUCOSE SERPL-MCNC: 117 MG/DL — HIGH (ref 70–99)
GLUCOSE SERPL-MCNC: 166 MG/DL — HIGH (ref 70–99)
HCT VFR BLD CALC: 33.2 % — LOW (ref 34.5–45)
HGB BLD-MCNC: 10.3 G/DL — LOW (ref 11.5–15.5)
INR BLD: 1.12 RATIO — SIGNIFICANT CHANGE UP (ref 0.88–1.16)
MCHC RBC-ENTMCNC: 25.6 PG — LOW (ref 27–34)
MCHC RBC-ENTMCNC: 31 GM/DL — LOW (ref 32–36)
MCV RBC AUTO: 82.4 FL — SIGNIFICANT CHANGE UP (ref 80–100)
NRBC # BLD: 0 /100 WBCS — SIGNIFICANT CHANGE UP (ref 0–0)
PLATELET # BLD AUTO: 126 K/UL — LOW (ref 150–400)
POTASSIUM SERPL-MCNC: 5.1 MMOL/L — SIGNIFICANT CHANGE UP (ref 3.5–5.3)
POTASSIUM SERPL-MCNC: 5.6 MMOL/L — HIGH (ref 3.5–5.3)
POTASSIUM SERPL-SCNC: 5.1 MMOL/L — SIGNIFICANT CHANGE UP (ref 3.5–5.3)
POTASSIUM SERPL-SCNC: 5.6 MMOL/L — HIGH (ref 3.5–5.3)
PROTHROM AB SERPL-ACNC: 13.4 SEC — SIGNIFICANT CHANGE UP (ref 10.6–13.6)
RBC # BLD: 4.03 M/UL — SIGNIFICANT CHANGE UP (ref 3.8–5.2)
RBC # FLD: 16.1 % — HIGH (ref 10.3–14.5)
SODIUM SERPL-SCNC: 127 MMOL/L — LOW (ref 135–145)
SODIUM SERPL-SCNC: 129 MMOL/L — LOW (ref 135–145)
SRA INTERP SER-IMP: SIGNIFICANT CHANGE UP
SRA INTERP SER-IMP: SIGNIFICANT CHANGE UP
WBC # BLD: 4.14 K/UL — SIGNIFICANT CHANGE UP (ref 3.8–10.5)
WBC # FLD AUTO: 4.14 K/UL — SIGNIFICANT CHANGE UP (ref 3.8–10.5)

## 2020-10-23 PROCEDURE — 84100 ASSAY OF PHOSPHORUS: CPT

## 2020-10-23 PROCEDURE — 87640 STAPH A DNA AMP PROBE: CPT

## 2020-10-23 PROCEDURE — 80053 COMPREHEN METABOLIC PANEL: CPT

## 2020-10-23 PROCEDURE — 85027 COMPLETE CBC AUTOMATED: CPT

## 2020-10-23 PROCEDURE — 83615 LACTATE (LD) (LDH) ENZYME: CPT

## 2020-10-23 PROCEDURE — 84484 ASSAY OF TROPONIN QUANT: CPT

## 2020-10-23 PROCEDURE — 36600 WITHDRAWAL OF ARTERIAL BLOOD: CPT

## 2020-10-23 PROCEDURE — 83550 IRON BINDING TEST: CPT

## 2020-10-23 PROCEDURE — 86901 BLOOD TYPING SEROLOGIC RH(D): CPT

## 2020-10-23 PROCEDURE — 82330 ASSAY OF CALCIUM: CPT

## 2020-10-23 PROCEDURE — 86022 PLATELET ANTIBODIES: CPT

## 2020-10-23 PROCEDURE — 82550 ASSAY OF CK (CPK): CPT

## 2020-10-23 PROCEDURE — 85014 HEMATOCRIT: CPT

## 2020-10-23 PROCEDURE — 82570 ASSAY OF URINE CREATININE: CPT

## 2020-10-23 PROCEDURE — 36569 INSJ PICC 5 YR+ W/O IMAGING: CPT

## 2020-10-23 PROCEDURE — 76376 3D RENDER W/INTRP POSTPROCES: CPT

## 2020-10-23 PROCEDURE — 86902 BLOOD TYPE ANTIGEN DONOR EA: CPT

## 2020-10-23 PROCEDURE — 96374 THER/PROPH/DIAG INJ IV PUSH: CPT | Mod: XU

## 2020-10-23 PROCEDURE — 87449 NOS EACH ORGANISM AG IA: CPT

## 2020-10-23 PROCEDURE — C1751: CPT

## 2020-10-23 PROCEDURE — 97530 THERAPEUTIC ACTIVITIES: CPT

## 2020-10-23 PROCEDURE — 80048 BASIC METABOLIC PNL TOTAL CA: CPT

## 2020-10-23 PROCEDURE — 84295 ASSAY OF SERUM SODIUM: CPT

## 2020-10-23 PROCEDURE — 71250 CT THORAX DX C-: CPT

## 2020-10-23 PROCEDURE — A9585: CPT

## 2020-10-23 PROCEDURE — 85018 HEMOGLOBIN: CPT

## 2020-10-23 PROCEDURE — 84132 ASSAY OF SERUM POTASSIUM: CPT

## 2020-10-23 PROCEDURE — 83010 ASSAY OF HAPTOGLOBIN QUANT: CPT

## 2020-10-23 PROCEDURE — 86769 SARS-COV-2 COVID-19 ANTIBODY: CPT

## 2020-10-23 PROCEDURE — 86880 COOMBS TEST DIRECT: CPT

## 2020-10-23 PROCEDURE — 84300 ASSAY OF URINE SODIUM: CPT

## 2020-10-23 PROCEDURE — 70553 MRI BRAIN STEM W/O & W/DYE: CPT

## 2020-10-23 PROCEDURE — 85730 THROMBOPLASTIN TIME PARTIAL: CPT

## 2020-10-23 PROCEDURE — 87150 DNA/RNA AMPLIFIED PROBE: CPT

## 2020-10-23 PROCEDURE — 82803 BLOOD GASES ANY COMBINATION: CPT

## 2020-10-23 PROCEDURE — 97162 PT EVAL MOD COMPLEX 30 MIN: CPT

## 2020-10-23 PROCEDURE — 86900 BLOOD TYPING SEROLOGIC ABO: CPT

## 2020-10-23 PROCEDURE — 74018 RADEX ABDOMEN 1 VIEW: CPT

## 2020-10-23 PROCEDURE — 82553 CREATINE MB FRACTION: CPT

## 2020-10-23 PROCEDURE — 94002 VENT MGMT INPAT INIT DAY: CPT

## 2020-10-23 PROCEDURE — 81001 URINALYSIS AUTO W/SCOPE: CPT

## 2020-10-23 PROCEDURE — 82436 ASSAY OF URINE CHLORIDE: CPT

## 2020-10-23 PROCEDURE — 86850 RBC ANTIBODY SCREEN: CPT

## 2020-10-23 PROCEDURE — 99291 CRITICAL CARE FIRST HOUR: CPT | Mod: 25

## 2020-10-23 PROCEDURE — 84133 ASSAY OF URINE POTASSIUM: CPT

## 2020-10-23 PROCEDURE — 83930 ASSAY OF BLOOD OSMOLALITY: CPT

## 2020-10-23 PROCEDURE — 87040 BLOOD CULTURE FOR BACTERIA: CPT

## 2020-10-23 PROCEDURE — 93308 TTE F-UP OR LMTD: CPT

## 2020-10-23 PROCEDURE — 82607 VITAMIN B-12: CPT

## 2020-10-23 PROCEDURE — 71045 X-RAY EXAM CHEST 1 VIEW: CPT

## 2020-10-23 PROCEDURE — 82728 ASSAY OF FERRITIN: CPT

## 2020-10-23 PROCEDURE — 97166 OT EVAL MOD COMPLEX 45 MIN: CPT

## 2020-10-23 PROCEDURE — 97110 THERAPEUTIC EXERCISES: CPT

## 2020-10-23 PROCEDURE — 82435 ASSAY OF BLOOD CHLORIDE: CPT

## 2020-10-23 PROCEDURE — 85025 COMPLETE CBC W/AUTO DIFF WBC: CPT

## 2020-10-23 PROCEDURE — 93321 DOPPLER ECHO F-UP/LMTD STD: CPT

## 2020-10-23 PROCEDURE — 82272 OCCULT BLD FECES 1-3 TESTS: CPT

## 2020-10-23 PROCEDURE — 0225U NFCT DS DNA&RNA 21 SARSCOV2: CPT

## 2020-10-23 PROCEDURE — 93005 ELECTROCARDIOGRAM TRACING: CPT

## 2020-10-23 PROCEDURE — 83605 ASSAY OF LACTIC ACID: CPT

## 2020-10-23 PROCEDURE — 87641 MR-STAPH DNA AMP PROBE: CPT

## 2020-10-23 PROCEDURE — 97535 SELF CARE MNGMENT TRAINING: CPT

## 2020-10-23 PROCEDURE — P9040: CPT

## 2020-10-23 PROCEDURE — 83540 ASSAY OF IRON: CPT

## 2020-10-23 PROCEDURE — 86922 COMPATIBILITY TEST ANTIGLOB: CPT

## 2020-10-23 PROCEDURE — 86780 TREPONEMA PALLIDUM: CPT

## 2020-10-23 PROCEDURE — 70498 CT ANGIOGRAPHY NECK: CPT

## 2020-10-23 PROCEDURE — 70450 CT HEAD/BRAIN W/O DYE: CPT

## 2020-10-23 PROCEDURE — 83036 HEMOGLOBIN GLYCOSYLATED A1C: CPT

## 2020-10-23 PROCEDURE — 36430 TRANSFUSION BLD/BLD COMPNT: CPT

## 2020-10-23 PROCEDURE — 84443 ASSAY THYROID STIM HORMONE: CPT

## 2020-10-23 PROCEDURE — 82962 GLUCOSE BLOOD TEST: CPT

## 2020-10-23 PROCEDURE — 83735 ASSAY OF MAGNESIUM: CPT

## 2020-10-23 PROCEDURE — 86870 RBC ANTIBODY IDENTIFICATION: CPT

## 2020-10-23 PROCEDURE — 85610 PROTHROMBIN TIME: CPT

## 2020-10-23 PROCEDURE — 87086 URINE CULTURE/COLONY COUNT: CPT

## 2020-10-23 PROCEDURE — 82947 ASSAY GLUCOSE BLOOD QUANT: CPT

## 2020-10-23 PROCEDURE — U0003: CPT

## 2020-10-23 PROCEDURE — 93306 TTE W/DOPPLER COMPLETE: CPT

## 2020-10-23 PROCEDURE — 51701 INSERT BLADDER CATHETER: CPT | Mod: XU

## 2020-10-23 PROCEDURE — 84439 ASSAY OF FREE THYROXINE: CPT

## 2020-10-23 PROCEDURE — 87070 CULTURE OTHR SPECIMN AEROBIC: CPT

## 2020-10-23 PROCEDURE — 86905 BLOOD TYPING RBC ANTIGENS: CPT

## 2020-10-23 PROCEDURE — 31500 INSERT EMERGENCY AIRWAY: CPT

## 2020-10-23 PROCEDURE — 71275 CT ANGIOGRAPHY CHEST: CPT

## 2020-10-23 PROCEDURE — 83935 ASSAY OF URINE OSMOLALITY: CPT

## 2020-10-23 PROCEDURE — 70496 CT ANGIOGRAPHY HEAD: CPT

## 2020-10-23 RX ORDER — ISOPROPYL ALCOHOL, BENZOCAINE .7; .06 ML/ML; ML/ML
1 SWAB TOPICAL
Qty: 100 | Refills: 1
Start: 2020-10-23 | End: 2020-12-11

## 2020-10-23 RX ORDER — PANTOPRAZOLE SODIUM 20 MG/1
1 TABLET, DELAYED RELEASE ORAL
Qty: 0 | Refills: 0 | DISCHARGE
Start: 2020-10-23

## 2020-10-23 RX ORDER — METOPROLOL TARTRATE 50 MG
1 TABLET ORAL
Qty: 30 | Refills: 0
Start: 2020-10-23 | End: 2020-11-21

## 2020-10-23 RX ORDER — ACETAMINOPHEN 500 MG
2 TABLET ORAL
Qty: 0 | Refills: 0 | DISCHARGE
Start: 2020-10-23

## 2020-10-23 RX ADMIN — POLYETHYLENE GLYCOL 3350 17 GRAM(S): 17 POWDER, FOR SOLUTION ORAL at 13:36

## 2020-10-23 RX ADMIN — Medication 2 TABLET(S): at 06:34

## 2020-10-23 RX ADMIN — Medication 5 MILLIGRAM(S): at 06:32

## 2020-10-23 RX ADMIN — FONDAPARINUX SODIUM 2.5 MILLIGRAM(S): 2.5 INJECTION, SOLUTION SUBCUTANEOUS at 13:37

## 2020-10-23 RX ADMIN — CYCLOSPORINE 1 DROP(S): 0.5 EMULSION OPHTHALMIC at 06:32

## 2020-10-23 RX ADMIN — Medication 25 MILLIGRAM(S): at 06:34

## 2020-10-23 RX ADMIN — Medication 1: at 13:37

## 2020-10-23 RX ADMIN — PANTOPRAZOLE SODIUM 40 MILLIGRAM(S): 20 TABLET, DELAYED RELEASE ORAL at 13:37

## 2020-10-23 RX ADMIN — Medication 1 DROP(S): at 06:32

## 2020-10-23 RX ADMIN — Medication 650 MILLIGRAM(S): at 00:42

## 2020-10-23 RX ADMIN — Medication 150 MICROGRAM(S): at 06:32

## 2020-10-23 NOTE — PROGRESS NOTE ADULT - ASSESSMENT
70 yo female with RA, DM, HTN, fibromyalgia, immune suppressed at home on Enbrel and 5 mg of prednisone bid  and remote repair of cerebral aneurysm now admitted with fever, weakness and hypoxia.  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  Required vent & pressors support in ICU for shock - shock resolved.   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  She recalls a rash to PCN, therefor was started  on meropenem which she appeared  to be tolerating  RVP is negative, MRSA screen positive.  TTE with mild AS, no vegetations.  Repeat 10/9 blood cultures are NG to date  She had developed pancytopenia, now improved.  S/p RUE PICC placement   Transient fever of 100.4 F on 10/14 - was pancultured  CTA chest neg for PE, CTH no acute event  Urine cx no growth   Recurrent fever of 100.2F on 10/17/20   she has been afebrile since 10/17    Repeat blood cx so far no growth   WBC is normal, she is anemic  Creatine is stable   meropenem switched to bactrim yesterday with rash  the patient reports she has no itching   Her anemia and thrombocytopenia and leukopenia predated bactrim.    Suggest:  D # 16 of 21 of antibiotics  she was receiving Merrem---->Bactrim  continue oral Bactrim, 2 ds tabs po BID , she appears to be tolerating antibiotics,  continue supportive care as per medicine, pulmonary, neurology and cardiology

## 2020-10-23 NOTE — CHART NOTE - NSCHARTNOTEFT_GEN_A_CORE
Prior to planned discharge home today, repeat BMP obtained.  Hyponatremia to Na 127 - repeated STAT -  Na 129  Baseline appears to be around 130 during this hospital stay.  No symptoms appreciated  Above discussed with Dr Daniel - patient is cleared for discharge home with out patient follow up today  Case Management arranging homemaker and transportation  Conversation with daughter. Keron earlier today - she is in agreement with discharge plan

## 2020-10-23 NOTE — PROGRESS NOTE ADULT - PROBLEM SELECTOR PROBLEM 3
Rheumatoid arthritis involving multiple sites, unspecified whether rheumatoid factor present
Bacteremia
Bacteremia
Pneumonia
Bacteremia

## 2020-10-23 NOTE — PROGRESS NOTE ADULT - REASON FOR ADMISSION
SOB

## 2020-10-23 NOTE — PROGRESS NOTE ADULT - PROBLEM SELECTOR PROBLEM 2
Hypothyroid
Atelectasis
Pneumonia
Severe sepsis

## 2020-10-23 NOTE — PROGRESS NOTE ADULT - PROBLEM SELECTOR PLAN 3
Off immune suppressors due to Sepsis
Rheum to FU, needs stress doses of steroids
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
CT chest 10/7 with Multifocal PNA vs. atelectasis   -Abx as per ID  -Normoxic
Multifocal PNA   -Abx as per MICU /ID
BC 10/7 Listeria  -Abx as per MICU / ID

## 2020-10-23 NOTE — PROGRESS NOTE ADULT - PROVIDER SPECIALTY LIST ADULT
Cardiology
Infectious Disease
Internal Medicine
MICU
Nephrology
Neurology
Pulmonology
Infectious Disease

## 2020-10-23 NOTE — PROGRESS NOTE ADULT - PROBLEM SELECTOR PROBLEM 4
Controlled type 2 diabetes mellitus without complication, without long-term current use of insulin
Bacteremia

## 2020-10-23 NOTE — PROGRESS NOTE ADULT - SUBJECTIVE AND OBJECTIVE BOX
Follow-up Pulm Progress Note    No new respiratory events overnight.  Denies SOB/CP.   Sats 96% RA    Medications:  MEDICATIONS  (STANDING):  artificial  tears Solution 1 Drop(s) Both EYES two times a day  chlorhexidine 2% Cloths 1 Application(s) Topical daily  cycloSPORINE (RESTASIS) 0.05% Emulsion 1 Drop(s) Both EYES two times a day  dextrose 5%. 1000 milliLiter(s) (50 mL/Hr) IV Continuous <Continuous>  dextrose 50% Injectable 12.5 Gram(s) IV Push once  dextrose 50% Injectable 25 Gram(s) IV Push once  dextrose 50% Injectable 25 Gram(s) IV Push once  fondaparinux Injectable 2.5 milliGRAM(s) SubCutaneous daily  insulin lispro (ADMELOG) corrective regimen sliding scale   SubCutaneous three times a day before meals  insulin lispro (ADMELOG) corrective regimen sliding scale   SubCutaneous at bedtime  levothyroxine 150 MICROGram(s) Oral daily  metoprolol succinate ER 25 milliGRAM(s) Oral daily  pantoprazole    Tablet 40 milliGRAM(s) Oral before breakfast  polyethylene glycol 3350 17 Gram(s) Oral daily  prednisoLONE acetate 1% Suspension 1 Drop(s) Both EYES two times a day  predniSONE   Tablet 5 milliGRAM(s) Oral daily  senna 2 Tablet(s) Oral at bedtime  trimethoprim  160 mG/sulfamethoxazole 800 mG 2 Tablet(s) Oral two times a day    MEDICATIONS  (PRN):  acetaminophen   Tablet .. 650 milliGRAM(s) Oral every 6 hours PRN Mild Pain (1 - 3)  dextrose 40% Gel 15 Gram(s) Oral once PRN Blood Glucose LESS THAN 70 milliGRAM(s)/deciliter  glucagon  Injectable 1 milliGRAM(s) IntraMuscular once PRN Glucose LESS THAN 70 milligrams/deciliter  hydrALAZINE Injectable 5 milliGRAM(s) IV Push every 6 hours PRN SBP >170  simethicone 80 milliGRAM(s) Chew three times a day PRN Gas  traMADol 50 milliGRAM(s) Oral two times a day PRN Moderate Pain (4 - 6)/Moderate Pain (4 - 6)          Vital Signs Last 24 Hrs  T(C): 36.4 (23 Oct 2020 04:41), Max: 36.9 (22 Oct 2020 20:52)  T(F): 97.6 (23 Oct 2020 04:41), Max: 98.4 (22 Oct 2020 20:52)  HR: 73 (23 Oct 2020 06:30) (70 - 77)  BP: 141/69 (23 Oct 2020 06:30) (141/69 - 167/77)  BP(mean): --  RR: 18 (23 Oct 2020 04:41) (18 - 19)  SpO2: 96% (23 Oct 2020 04:41) (96% - 98%)          10-22 @ 07:01  -  10-23 @ 07:00  --------------------------------------------------------  IN: 820 mL / OUT: 1050 mL / NET: -230 mL          LABS:                        10.3   4.14  )-----------( 126      ( 23 Oct 2020 06:53 )             33.2     10-22    132<L>  |  101  |  5<L>  ----------------------------<  80  4.3   |  23  |  <0.30<L>    Ca    8.2<L>      22 Oct 2020 05:12            CAPILLARY BLOOD GLUCOSE      POCT Blood Glucose.: 84 mg/dL (23 Oct 2020 08:22)          CULTURES:     Culture - Blood (collected 10-17-20 @ 18:24)  Source: .Blood Blood-Peripheral  Final Report (10-22-20 @ 19:00):    No Growth Final    Culture - Blood (collected 10-17-20 @ 18:24)  Source: .Blood Blood-Peripheral  Final Report (10-22-20 @ 19:00):    No Growth Final    Culture - Blood (collected 10-14-20 @ 17:44)  Source: .Blood Blood  Final Report (10-19-20 @ 18:00):    No Growth Final    Culture - Blood (collected 10-14-20 @ 17:44)  Source: .Blood Blood  Final Report (10-19-20 @ 18:00):    No Growth Final        Culture - Urine (collected 10-14-20 @ 18:50)  Source: .Urine Catheterized  Final Report (10-15-20 @ 17:01):    No growth          Physical Examination:  PULM: Clear to auscultation bilaterally, no significant sputum production  CVS: RRR    RADIOLOGY REVIEWED  CT chest: < from: CT Angio Chest w/ IV Cont (10.14.20 @ 21:30) >  FINDINGS:    LUNGS AND AIRWAYS: Low lung volumes. Elevated diaphragm. Interval improvement in bilateral posterior and dependent consolidations previously seen on chest CT from 10/7/2020.  PLEURA: Bilateral small left and trace right pleural effusions.  MEDIASTINUM AND ROSEMARY: No lymphadenopathy.  VESSELS: The main pulmonary artery measures 2.1 cm. There is no main, central, lobar, or segmental pulmonary embolus. Right upper extremity PICC with tip terminating in the right atrium. Calcified and noncalcified ulcerative plaque involving the aorta.  HEART: Heart size is normal. No pericardial effusion. Coronary arterial calcifications. No signs of right heart strain.  CHEST WALL AND LOWER NECK: Within normal limits.  VISUALIZED UPPER ABDOMEN: Embolization coil mass in the gastrohepatic space.  BONES: Degenerative changes.    IMPRESSION:    No main, lobar, or segmental pulmonary embolism. Limited evaluation the subsegmental pulmonary arteries due to respiratory motion.    Improvement in bilateral atelectasis seen on chest CT from 10/7/2020. Small left and trace right pleural effusions.    < end of copied text >

## 2020-10-23 NOTE — PROGRESS NOTE ADULT - PROBLEM SELECTOR PROBLEM 1
Sepsis due to Streptococcus pneumoniae without acute organ dysfunction
Acute respiratory failure with hypoxia and hypercapnia

## 2020-10-23 NOTE — PROGRESS NOTE ADULT - PROBLEM SELECTOR PLAN 1
Acute resp failure improved  Prob sec to pneumonia  Sepsis is sec to Listeria, prob due to immune suppressors for RA
Acute resp failure resolved  Sepsis is sec to Listeria, prob due to immune suppressors for RA
Acute resp failure resolved  Sepsis is sec to Listeria, prob due to immune suppressors for RA  Dizziness : Vertiginous. CT Head ordered.  CP and hypotension: CT Chest to r/o PE due to hypoxia
Acute resp failure resolved  Sepsis is sec to Listeria, prob due to immune suppressors for RA  Low grade fever noted
Acute resp failure resolved  Sepsis is sec to Listeria, prob due to immune suppressors for RA  repeat culture negative
Acute resp failure resolved  Sepsis is sec to Listeria, prob due to immune suppressors for RA  repeat culture negative  Drug eruption noted. Antibiotics changed to Bactrim IV
Acute resp failure resolved  Sepsis is sec to Listeria, prob due to immune suppressors for RA  repeat culture negative  Drug eruption noted. Antibiotics changed to Bactrim IV
Acute resp failure resolved  Sepsis is sec to Listeria, prob due to immune suppressors for RA  repeat culture negative  Drug eruption noted. Antibiotics changed to Bactrim PO now\  DC PICC in AM in prep for DC
Acute resp failure sec. to hypoxia  Prob sec to pneumonia  Management and Antibiotics as per MICU
s/p intubation in ED 10/7  -COVID PCR neg x2  -Keep pH >7.20  -Keep sats >90%  -PS trials as tolerated per MICU
s/p intubation in ED 10/7 / extubated 10/9  -COVID PCR neg x2  -Hypercarbia and hypoxia now resolved  -Keep sats >90% with supplemental O2 PRN
s/p intubation in ED 10/7 / extubated 10/9  -COVID PCR neg x2  -Hypercarbia and hypoxia now resolved  -Normoxic

## 2020-10-23 NOTE — PROVIDER CONTACT NOTE (OTHER) - ACTION/TREATMENT ORDERED:
EKG performed at bedside, placed back on tele monitor, troponin, BNP, CKMB drawn as per order. Tylenol IVPB administered, patient verbalized relief s/p tylenol administration.

## 2020-10-23 NOTE — PROGRESS NOTE ADULT - SUBJECTIVE AND OBJECTIVE BOX
S: Reports improved abd pain, denies chest pain or SOB. Review of systems otherwise (-)      MEDICATIONS  (STANDING):  artificial  tears Solution 1 Drop(s) Both EYES two times a day  chlorhexidine 2% Cloths 1 Application(s) Topical daily  cycloSPORINE (RESTASIS) 0.05% Emulsion 1 Drop(s) Both EYES two times a day  dextrose 5%. 1000 milliLiter(s) (50 mL/Hr) IV Continuous <Continuous>  dextrose 50% Injectable 12.5 Gram(s) IV Push once  dextrose 50% Injectable 25 Gram(s) IV Push once  dextrose 50% Injectable 25 Gram(s) IV Push once  fondaparinux Injectable 2.5 milliGRAM(s) SubCutaneous daily  insulin lispro (ADMELOG) corrective regimen sliding scale   SubCutaneous three times a day before meals  insulin lispro (ADMELOG) corrective regimen sliding scale   SubCutaneous at bedtime  levothyroxine 150 MICROGram(s) Oral daily  metoprolol succinate ER 25 milliGRAM(s) Oral daily  pantoprazole    Tablet 40 milliGRAM(s) Oral before breakfast  polyethylene glycol 3350 17 Gram(s) Oral daily  prednisoLONE acetate 1% Suspension 1 Drop(s) Both EYES two times a day  predniSONE   Tablet 5 milliGRAM(s) Oral daily  senna 2 Tablet(s) Oral at bedtime  trimethoprim  160 mG/sulfamethoxazole 800 mG 2 Tablet(s) Oral two times a day    MEDICATIONS  (PRN):  acetaminophen   Tablet .. 650 milliGRAM(s) Oral every 6 hours PRN Mild Pain (1 - 3)  dextrose 40% Gel 15 Gram(s) Oral once PRN Blood Glucose LESS THAN 70 milliGRAM(s)/deciliter  glucagon  Injectable 1 milliGRAM(s) IntraMuscular once PRN Glucose LESS THAN 70 milligrams/deciliter  hydrALAZINE Injectable 5 milliGRAM(s) IV Push every 6 hours PRN SBP >170  simethicone 80 milliGRAM(s) Chew three times a day PRN Gas  traMADol 50 milliGRAM(s) Oral two times a day PRN Moderate Pain (4 - 6)/Moderate Pain (4 - 6)      LABS:                            10.3   4.14  )-----------( 126      ( 23 Oct 2020 06:53 )             33.2     Hemoglobin: 10.3 g/dL (10-23 @ 06:53)  Hemoglobin: 9.6 g/dL (10-22 @ 04:11)  Hemoglobin: 7.2 g/dL (10-21 @ 07:06)  Hemoglobin: 7.4 g/dL (10-20 @ 14:58)  Hemoglobin: 8.3 g/dL (10-19 @ 05:05)    10-22    132<L>  |  101  |  5<L>  ----------------------------<  80  4.3   |  23  |  <0.30<L>    Ca    8.2<L>      22 Oct 2020 05:12      Creatinine Trend: <0.30<--, <0.30<--, <0.30<--, <0.30<--, <0.30<--, <0.30<--             10-22-20 @ 07:01  -  10-23-20 @ 07:00  --------------------------------------------------------  IN: 820 mL / OUT: 1050 mL / NET: -230 mL    10-23-20 @ 07:01  -  10-23-20 @ 12:44  --------------------------------------------------------  IN: 180 mL / OUT: 0 mL / NET: 180 mL        PHYSICAL EXAM  Vital Signs Last 24 Hrs  T(C): 36.3 (23 Oct 2020 11:50), Max: 36.9 (22 Oct 2020 20:52)  T(F): 97.4 (23 Oct 2020 11:50), Max: 98.4 (22 Oct 2020 20:52)  HR: 59 (23 Oct 2020 11:50) (59 - 77)  BP: 116/70 (23 Oct 2020 11:50) (116/70 - 167/77)  BP(mean): --  RR: 18 (23 Oct 2020 11:50) (18 - 19)  SpO2: 98% (23 Oct 2020 11:50) (96% - 98%)      Gen: Appears well in NAD  HEENT:  (-)icterus (-)pallor  CV: N S1 S2 1/6 JUAN (+)2 Pulses B/l  Resp:  Clear to auscultation B/L, normal effort  GI: (+) BS Soft, NT, ND  Lymph:  (-)Edema, (-)obvious lymphadenopathy  Skin: Warm to touch, Normal turgor  Psych: Appropriate mood and affect    Tele: SR 60-70s    ASSESSMENT/PLAN: 70y Female  history of RA, DM, HTN, fibromyalgia, hypothyroidism, brain aneurysm s/p repair BIBEMS for shortness of breath and fever PNA with an episode of hypotension and (+) Trop.    - Tele stable with no arrhythmia  - Repeat echo confirms normal LVEF (no wall motion abnormalities) with a hyperdynamic LV creating moderate LVOT obstruction from MANSOOR.   - Recommend beta blockers, avoiding dehydration, avoiding afterload reducers - continue Toprol XL 25mg daily, lisinopril d/marli  - Follow up neuro regarding MRI results with small acute/subacute lacunar infarcts  - Recommend outpatient NST  - No further inpatient cardiac w/u needed  - Patient to f/u with her own cardiologist Dr. Woods after d/c    Lee Encinas PA-C  Pager: [phone]

## 2020-10-23 NOTE — PROGRESS NOTE ADULT - SUBJECTIVE AND OBJECTIVE BOX
CC: f/u for listeria bacteremia, bacteremia has resolved she is completing her course of antibiotics as planned    Patient reports that she is feeling fine and has no itching, rash is improved     REVIEW OF SYSTEMS:  All other review of systems negative (Comprehensive ROS)    Antimicrobials Day #  :day 16/21  trimethoprim  160 mG/sulfamethoxazole 800 mG 2 Tablet(s) Oral three times a day    Other Medications Reviewed  MEDICATIONS  (STANDING):  artificial  tears Solution 1 Drop(s) Both EYES two times a day  chlorhexidine 2% Cloths 1 Application(s) Topical daily  cycloSPORINE (RESTASIS) 0.05% Emulsion 1 Drop(s) Both EYES two times a day  dextrose 5%. 1000 milliLiter(s) (50 mL/Hr) IV Continuous <Continuous>  dextrose 50% Injectable 12.5 Gram(s) IV Push once  dextrose 50% Injectable 25 Gram(s) IV Push once  dextrose 50% Injectable 25 Gram(s) IV Push once  fondaparinux Injectable 2.5 milliGRAM(s) SubCutaneous daily  insulin lispro (ADMELOG) corrective regimen sliding scale   SubCutaneous three times a day before meals  insulin lispro (ADMELOG) corrective regimen sliding scale   SubCutaneous at bedtime  levothyroxine Injectable 75 MICROGram(s) IV Push at bedtime  metoprolol succinate ER 25 milliGRAM(s) Oral daily  polyethylene glycol 3350 17 Gram(s) Oral daily  prednisoLONE acetate 1% Suspension 1 Drop(s) Both EYES two times a day  predniSONE   Tablet 5 milliGRAM(s) Oral daily  senna 2 Tablet(s) Oral at bedtime  trimethoprim  160 mG/sulfamethoxazole 800 mG 2 Tablet(s) Oral three times a day    Vital Signs Last 24 Hrs  T(C): 36.3 (23 Oct 2020 11:50), Max: 36.9 (22 Oct 2020 20:52)  T(F): 97.4 (23 Oct 2020 11:50), Max: 98.4 (22 Oct 2020 20:52)  HR: 59 (23 Oct 2020 11:50) (59 - 73)  BP: 116/70 (23 Oct 2020 11:50) (116/70 - 164/60)  BP(mean): --  RR: 18 (23 Oct 2020 11:50) (18 - 18)  SpO2: 98% (23 Oct 2020 11:50) (96% - 98%)    PHYSICAL EXAM:  General: alert, no acute distress, frail  Eyes:  anicteric, no conjunctival injection, no discharge  Oropharynx: no lesions or injection 	  Neck: supple, without adenopathy  Lungs: clear to auscultation  Heart: regular rate and rhythm; no murmur, rubs or gallops  Abdomen: soft, nondistended, nontender, without mass or organomegaly  Skin: faint reticular rash  Extremities: no clubbing, cyanosis, or edema  Neurologic: alert, oriented, moves all extremities    LAB RESULTS:                                       10.3   4.14  )-----------( 126      ( 23 Oct 2020 06:53 )             33.2                         10-23    127<L>  |  96  |  8   ----------------------------<  166<H>  5.1   |  23  |  <0.30<L>    Ca    8.7      23 Oct 2020 12:52          MICROBIOLOGY:  RECENT CULTURES:  10-17 @ 18:24 .Blood Blood-Peripheral     No growth to date.          RADIOLOGY REVIEWED:  ad  < from: Xray Chest 1 View- PORTABLE-Urgent (Xray Chest 1 View- PORTABLE-Urgent .) (10.19.20 @ 18:08) >  INTERPRETATION:  CLINICAL INFORMATION: PICC placement.    EXAM: Frontal radiograph of the chest.    COMPARISON: Chest radiograph from 10/14/2020    FINDINGS:    Limited evaluation secondary to patient positioning. There is a right-sided PICC with the tip in the right atrium.    Cardiac size cannot be determined on this AP projection.    Unchanged small left pleural effusion with left lung base atelectasis.    Embolization clips seen in the upper abdomen.    Degenerative changes of the spine.      IMPRESSION: Right-sided PICC with the tip in the right atrium.  These findings were discussed with PRITI GARCIA  at 10/20/2020 10:14 AM by Dr. Russell with read back confirmation.    < end of copied text >

## 2020-10-23 NOTE — PROGRESS NOTE ADULT - ASSESSMENT
71 yo F with history of RA, DM, HTN, fibromyalgia, hypothyroidism, brain aneurysm s/p repair (2004?) BIBEMS for shortness of breath and fever. Per EMS, patient has had 3 days of fever, Tmax 102.5 , no known COVID exposures, took tylenol at unknown time before being brought in. patient didn't take her med for 2 days prior to admission. Patient c/o feeling fatigued, short of breath. Denies chest pain. Patient uses a wheelchair at baseline 2/2 arthritis. Intubated and in ICU extubated on 10/9 then transferred to floor.  Neurology called for dizzy sensation and h/o aneurysm.  Na improving 129-->130 today, Hgb also improving. 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 CTH obtained unremarkable. TTE with LVH, A1c7.3%  o/e proximal weakness> distal. Dizziness may be multifactorial 2/2 anemia and hyponatremia. dizziness now improved. s/p 1 unit PRBC for drop in Hgb yesterday. wants to go home, Hgb improved. .    - dizziness improved no need for MRI brain.   - pending NST per cardio can be outpatient?  - check FS, glucose control <180  - GI/DVT ppx  - Counseling on diet, exercise, and medication adherence was done  - Counseling on smoking cessation and alcohol consumption offered when appropriate.  - Pain assessed and judicious use of narcotics when appropriate was discussed.    - Stroke education given when appropriate.  - Importance of fall prevention discussed.   - Differential diagnosis and plan of care discussed with patient and/or family and primary team  - Thank you for allowing me to participate in the care of this patient. Call with questions.   - dispo planning  with home care  Dez Madera MD  Vascular Neurology  Office: [phone]

## 2020-10-23 NOTE — PROVIDER CONTACT NOTE (OTHER) - BACKGROUND
89 yo female with PMH of Afib, admitted for GIB that's resolved, on PEG tube feeding, off tele today, c/o sharp chest pain 8/10 radiating to jaw and back.

## 2020-10-23 NOTE — PROGRESS NOTE ADULT - SUBJECTIVE AND OBJECTIVE BOX
Neurology Progress Note    S: Patient seen and examined. son at bedside.  s/p 1 unit PRBC for drop in Hgb yesterday.  wants to go home. feels better     Medication:  artificial  tears Solution 1 Drop(s) Both EYES two times a day  chlorhexidine 2% Cloths 1 Application(s) Topical daily  cycloSPORINE (RESTASIS) 0.05% Emulsion 1 Drop(s) Both EYES two times a day  dextrose 40% Gel 15 Gram(s) Oral once PRN  dextrose 5%. 1000 milliLiter(s) IV Continuous <Continuous>  dextrose 50% Injectable 12.5 Gram(s) IV Push once  dextrose 50% Injectable 25 Gram(s) IV Push once  dextrose 50% Injectable 25 Gram(s) IV Push once  fondaparinux Injectable 2.5 milliGRAM(s) SubCutaneous daily  glucagon  Injectable 1 milliGRAM(s) IntraMuscular once PRN  hydrALAZINE Injectable 5 milliGRAM(s) IV Push every 6 hours PRN  insulin lispro (HumaLOG) corrective regimen sliding scale   SubCutaneous three times a day before meals  insulin lispro (HumaLOG) corrective regimen sliding scale   SubCutaneous at bedtime  levothyroxine Injectable 75 MICROGram(s) IV Push at bedtime  lisinopril 10 milliGRAM(s) Oral daily  meropenem  IVPB 2000 milliGRAM(s) IV Intermittent every 8 hours  polyethylene glycol 3350 17 Gram(s) Oral daily  prednisoLONE acetate 1% Suspension 1 Drop(s) Both EYES two times a day  predniSONE   Tablet 5 milliGRAM(s) Oral daily  senna 2 Tablet(s) Oral at bedtime  simethicone 80 milliGRAM(s) Chew three times a day PRN      Vitals:  Vital Signs Last 24 Hrs  T(C): 36.8 (16 Oct 2020 05:01), Max: 37.1 (15 Oct 2020 20:54)  T(F): 98.3 (16 Oct 2020 05:01), Max: 98.7 (15 Oct 2020 20:54)  HR: 102 (16 Oct 2020 05:01) (70 - 102)  BP: 149/84 (16 Oct 2020 05:21) (100/64 - 149/84)  BP(mean): --  RR: 18 (16 Oct 2020 05:21) (18 - 18)  SpO2: 98% (16 Oct 2020 05:21) (96% - 98%)    General Exam:   General Appearance: Appropriately dressed and in no acute distress       Head: Normocephalic, atraumatic and no dysmorphic features  Ear, Nose, and Throat: Moist mucous membranes  CVS: S1S2+  Resp: mild SOB but no wheeze  GI: soft NT/ND  Extremeties: mild LE edema no  cyanosis  Skin: some bruises from IV, LUE rash and macular rash on chest      Neurological Exam:  Mental Status: Awake, alert and oriented x 3.  Able to follow simple and complex verbal commands. Able to name and repeat. fluent speech. No obvious aphasia or dysarthria noted.   Cranial Nerves: PERRL, EOMI, VFFC, sensation V1-V3 intact,  no obvious facial assymetry, equal elevation of palate, scm/trap 5/5, tongue is midline on protrusion. no obvious papilledema on fundoscopic exam. hearing is grossly intact.   Motor: GAMA but proximally limited motion 1/5 in UE and LE proximally, but distally x 4 she is 4/5 (baseline?)   Sensation: Intact to light touch and pinprick throughout. no right/left confusion.   Reflexes: 1+ throughout at biceps, brachioradialis, triceps, patellars and ankles bilaterally and equal. No clonus. R toe and L toe were both downgoing.  Coordination: unable   Gait: wheelchair bound    I personally reviewed the below data/images/labs:      artificial  tears Solution 1 Drop(s) Both EYES two times a day  chlorhexidine 2% Cloths 1 Application(s) Topical daily  cycloSPORINE (RESTASIS) 0.05% Emulsion 1 Drop(s) Both EYES two times a day  dextrose 40% Gel 15 Gram(s) Oral once PRN  dextrose 5%. 1000 milliLiter(s) IV Continuous <Continuous>  dextrose 50% Injectable 12.5 Gram(s) IV Push once  dextrose 50% Injectable 25 Gram(s) IV Push once  dextrose 50% Injectable 25 Gram(s) IV Push once  fondaparinux Injectable 2.5 milliGRAM(s) SubCutaneous daily  glucagon  Injectable 1 milliGRAM(s) IntraMuscular once PRN  hydrALAZINE Injectable 5 milliGRAM(s) IV Push every 6 hours PRN  insulin lispro (HumaLOG) corrective regimen sliding scale   SubCutaneous three times a day before meals  insulin lispro (HumaLOG) corrective regimen sliding scale   SubCutaneous at bedtime  levothyroxine Injectable 75 MICROGram(s) IV Push at bedtime  lisinopril 10 milliGRAM(s) Oral daily  meropenem  IVPB 2000 milliGRAM(s) IV Intermittent every 8 hours  polyethylene glycol 3350 17 Gram(s) Oral daily  prednisoLONE acetate 1% Suspension 1 Drop(s) Both EYES two times a day  predniSONE   Tablet 5 milliGRAM(s) Oral daily  senna 2 Tablet(s) Oral at bedtime  simethicone 80 milliGRAM(s) Chew three times a day PRN    CBC Full  -  ( 16 Oct 2020 06:13 )  WBC Count : 6.96 K/uL  RBC Count : 3.55 M/uL  Hemoglobin : 8.7 g/dL  Hematocrit : 29.1 %  Platelet Count - Automated : 171 K/uL  Mean Cell Volume : 82.0 fl  Mean Cell Hemoglobin : 24.5 pg  Mean Cell Hemoglobin Concentration : 29.9 gm/dL  Auto Neutrophil # : x  Auto Lymphocyte # : x  Auto Monocyte # : x  Auto Eosinophil # : x  Auto Basophil # : x  Auto Neutrophil % : x  Auto Lymphocyte % : x  Auto Monocyte % : x  Auto Eosinophil % : x  Auto Basophil % : x    10-16    130<L>  |  97  |  8   ----------------------------<  124<H>  4.3   |  22  |  <0.30<L>    Ca    8.5      16 Oct 2020 06:13          Urinalysis Basic - ( 14 Oct 2020 15:24 )    Color: Light Yellow / Appearance: Clear / S.013 / pH: x  Gluc: x / Ketone: Trace  / Bili: Negative / Urobili: Negative   Blood: x / Protein: Trace / Nitrite: Negative   Leuk Esterase: Negative / RBC: 3 /hpf / WBC 1 /HPF   Sq Epi: x / Non Sq Epi: 2 /hpf / Bacteria: Negative            CTA chest: < from: CT Angio Chest w/ IV Cont (10.14.20 @ 21:30) >  FINDINGS:    LUNGS AND AIRWAYS: Low lung volumes. Elevated diaphragm. Interval improvement in bilateral posterior and dependent consolidations previously seen on chest CT from 10/7/2020.  PLEURA: Bilateral small left and trace right pleural effusions.  MEDIASTINUM AND ROSEMARY: No lymphadenopathy.  VESSELS: The main pulmonary artery measures 2.1 cm. There is no main, central, lobar, or segmental pulmonary embolus. Right upper extremity PICC with tip terminating in the right atrium. Calcified and noncalcified ulcerative plaque involving the aorta.  HEART: Heart size is normal. No pericardial effusion. Coronary arterial calcifications. No signs of right heart strain.  CHEST WALL AND LOWER NECK: Within normal limits.  VISUALIZED UPPER ABDOMEN: Embolization coil mass in the gastrohepatic space.  BONES: Degenerative changes.    IMPRESSION:    No main, lobar, or segmental pulmonary embolism. Limited evaluation the subsegmental pulmonary arteries due to respiratory motion.    Improvement in bilateral atelectasis seen on chest CT from 10/7/2020. Small left and trace right pleural effusions.      CTH 10/14/20  IMPRESSION: No acute intracranial hemorrhage, mass effect, or shift of the midline structures.    Mild interval advancement of chronic white matter microvascular type changes.    TTE: 1. Calcified trileaflet aortic valve with decreased  opening. Peak transaortic valve gradient equals 16 mm Hg,  mean transaortic valve gradient equals 8 mm Hg, estimated  aortic valve area equals 1.6 sqcm (by continuity equation),  aortic valve velocity time integral equals 28 cm,  consistent with mild aortic stenosis.  2. Increased relative wall thickness with normal left  ventricular mass index, consistent with concentric left  ventricular remodeling.  3. Normal left ventricular systolic function. No segmental  wall motion abnormalities.  4. Normal right ventricular size and function.  *** No previous Echo exam.  Unable to rule out endocarditis, consider JAIME if clinicaly  indicated.  Hgb A1c 7.3

## 2020-10-23 NOTE — PROGRESS NOTE ADULT - PROBLEM SELECTOR PLAN 4
RISS
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 ID f/u.
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 Marla stopped and changed to Bactrim as per ID. Rash now improved.
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

## 2020-10-26 ENCOUNTER — NON-APPOINTMENT (OUTPATIENT)
Age: 70
End: 2020-10-26

## 2020-10-28 ENCOUNTER — APPOINTMENT (OUTPATIENT)
Dept: CARE COORDINATION | Facility: HOME HEALTH | Age: 70
End: 2020-10-28
Payer: MEDICARE

## 2020-10-28 PROBLEM — E03.9 HYPOTHYROIDISM, UNSPECIFIED: Chronic | Status: ACTIVE | Noted: 2020-10-07

## 2020-10-28 PROBLEM — M79.7 FIBROMYALGIA: Chronic | Status: ACTIVE | Noted: 2020-10-07

## 2020-10-28 PROBLEM — M06.9 RHEUMATOID ARTHRITIS, UNSPECIFIED: Chronic | Status: ACTIVE | Noted: 2020-10-07

## 2020-10-28 PROBLEM — E11.9 TYPE 2 DIABETES MELLITUS WITHOUT COMPLICATIONS: Chronic | Status: ACTIVE | Noted: 2020-10-07

## 2020-10-28 PROCEDURE — 99347 HOME/RES VST EST SF MDM 20: CPT | Mod: 95

## 2020-10-28 RX ORDER — SULFAMETHOXAZOLE AND TRIMETHOPRIM 800; 160 MG/1; MG/1
800-160 TABLET ORAL
Refills: 0 | Status: ACTIVE | COMMUNITY

## 2020-10-28 RX ORDER — ONDANSETRON 4 MG/1
4 TABLET, ORALLY DISINTEGRATING ORAL 3 TIMES DAILY
Qty: 15 | Refills: 0 | Status: DISCONTINUED | COMMUNITY
Start: 2020-10-24 | End: 2020-10-28

## 2020-10-28 NOTE — PHYSICAL EXAM
[No Acute Distress] : no acute distress [No Respiratory Distress] : no respiratory distress  [No Accessory Muscle Use] : no accessory muscle use

## 2020-10-28 NOTE — PLAN
[FreeTextEntry1] : 69 y/o woman with PMHx of HTN, T2DM, Fibromyalgia, Hypothyroidism, RA, Brain aneurysm s/p repair with recent admission for sepsis/PNA. \par \par Encouraged hydration, monitor temperature/Tylenol.Advised to contact CN/TCM on the number provided with any questions, new or worsening symptoms to facilitate coordination with medical providers.\par \par Blood work completed today via [facility]; Call placed to lab for anticipated time for results, some pending and they will fax to Dr. Daniel. Provided daughter with [facility] Infectious disease doctors who perform telehealth visits. \par

## 2020-10-28 NOTE — HISTORY OF PRESENT ILLNESS
[Home] : at home, [unfilled] , at the time of the visit. [Other Location: e.g. Home (Enter Location, City,State)___] : at [unfilled] [Spouse] : spouse [Other:____] : [unfilled] [FreeTextEntry1] : TCM Follow up for hospitalization: Pneumonia  [de-identified] : 69 y/o woman with PMHx of HTN, T2DM, Fibromyalgia, Hypothyroidism, RA, Brain aneurysm s/p repair who presented at [facility] with SOB and fever. Patient admitted for sepsis secondary to PNA. Blood cultures +Listeria. Hospital course consisted of vent, pressors, iv antibiotics. JAIME: mild AS, no vegetation. Repeat blood cultures: no growth. Patient transitioned to Bactrim for 21 day course. Patient discharged home with home care. \par \par Patient observed via telehealth. Patient’s daughter, Marysol and spouse during visit. Alert and following commands. Max assist with assistance. Denies SOB, Chest pain/pressure, increased cough/sputum, dysuria, foul smelling urine, urine frequency and/or lethargy.  +Nausea since discharge, resolving with prochlorperazine. Appetite slowly improving. B. + Lower back pain that started ~1 day, not radiating and achy. Daughter reports patient was found to have 100.1 F temp (tympanic) this afternoon. Patient did have a heating pad on during the time. Daughter gave patient Tylenol 650mg, currently patient is afebrile.  [FreeTextEntry3] : Marysol, Daughter

## 2020-10-28 NOTE — REVIEW OF SYSTEMS
[Fever] : fever [Nausea] : nausea [Incontinence] : incontinence [Back Pain] : back pain [Negative] : Psychiatric [Abdominal Pain] : no abdominal pain [Constipation] : no constipation [Vomiting] : no vomiting [Dysuria] : no dysuria [Hematuria] : no hematuria [Frequency] : no frequency

## 2020-11-04 ENCOUNTER — NON-APPOINTMENT (OUTPATIENT)
Age: 70
End: 2020-11-04

## 2020-11-11 ENCOUNTER — NON-APPOINTMENT (OUTPATIENT)
Age: 70
End: 2020-11-11

## 2020-11-13 ENCOUNTER — NON-APPOINTMENT (OUTPATIENT)
Age: 70
End: 2020-11-13

## 2020-11-17 ENCOUNTER — APPOINTMENT (OUTPATIENT)
Dept: INFECTIOUS DISEASE | Facility: CLINIC | Age: 70
End: 2020-11-17
Payer: MEDICARE

## 2020-11-17 DIAGNOSIS — Z87.39 PERSONAL HISTORY OF OTHER DISEASES OF THE MUSCULOSKELETAL SYSTEM AND CONNECTIVE TISSUE: ICD-10-CM

## 2020-11-17 DIAGNOSIS — A60.09 HERPESVIRAL INFECTION OF OTHER UROGENITAL TRACT: ICD-10-CM

## 2020-11-17 DIAGNOSIS — Z83.3 FAMILY HISTORY OF DIABETES MELLITUS: ICD-10-CM

## 2020-11-17 PROCEDURE — 99204 OFFICE O/P NEW MOD 45 MIN: CPT | Mod: 95

## 2020-11-17 RX ORDER — VALACYCLOVIR 500 MG/1
500 TABLET, FILM COATED ORAL TWICE DAILY
Qty: 14 | Refills: 0 | Status: ACTIVE | COMMUNITY
Start: 2020-11-17 | End: 1900-01-01

## 2020-11-17 NOTE — PHYSICAL EXAM
[General Appearance - Alert] : alert [General Appearance - In No Acute Distress] : in no acute distress [Sclera] : the sclera and conjunctiva were normal [Neck Appearance] : the appearance of the neck was normal [Edema] : there was no peripheral edema [Abdomen Soft] : soft [FreeTextEntry1] : papules in genital area.  [Oriented To Time, Place, And Person] : oriented to person, place, and time [Affect] : the affect was normal

## 2020-11-17 NOTE — HISTORY OF PRESENT ILLNESS
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 \par RVP negative, MRSA screen positive.\par TTE with mild AS, no vegetations.\par CTA chest neg for PE, CTH no acute event\par Urine cx no growth \par Multiple Repeat blood cx were no growth \par meropenem switched to bactrim due to rash.\par Pt was discharged on bactrim DS, developed thrombocytopenia, leucopenia, hyponatremia and fever on bactrim.\par S/p completion of abx therapy for listeria with 21 days. \par Bactrim was stopped, she improved but continued to have low grade fever at home, with weakness.\par Her prednisone was increased to 20 mg daily with resolution of fever and improvement in weakness. But as soon as the prednisone was stopped her fever recurred. \par She was seen by her PMD had blood cx performed which are negative and urine cx with klebsiella. \par She was started on cipro yesterday. \par Currently complaining of low back pain going down the tailbone, which is intermittent, improved with prednisone but not resolved. \par Noted to have new rash today which is itchy and bumpy in genital area.

## 2020-11-17 NOTE — REVIEW OF SYSTEMS
[Fever] : fever [Feeling Tired] : feeling tired [Normal Appetite] : appetite not normal  [Red Eyes] : eyes not red [Sore Throat] : no sore throat [Chest Pain] : no chest pain [Lower Ext Edema] : no lower extremity edema [Shortness Of Breath] : no shortness of breath [Cough] : no cough [Abdominal Pain] : abdominal pain [Constipation] : constipation [Dysuria] : no dysuria [Joint Pain] : joint pain [As Noted in HPI] : as noted in HPI [Confused] : no confusion [Anxiety] : no anxiety

## 2020-11-17 NOTE — REASON FOR VISIT
[Home] : at home, [unfilled] , at the time of the visit. [Medical Office: ([facility])___] : at the medical office located in  [Spouse] : spouse [Other:____] : [unfilled] [Verbal consent obtained from patient] : the patient, [unfilled] [Acute] : an acute visit

## 2020-11-17 NOTE — ASSESSMENT
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 \par Intermittent fevers\par She was seen by her PMD had blood cx performed which are negative and urine cx with klebsiella. \par She was started on cipro yesterday. \par UTI with klebsiella\par possible HSV flare\par ?gential candiadiasis. \par Exam limited due to GI-View, advised if any worsening, she will need in person visit\par will order valtrex 500 mg po bid for 7 days for possible HSV\par Given pt has been off biologics for almost 2 months, not sure if her weakness and low grade temp could be due underlying RA\par Advised rheum follow up\par discussed vaccines, to be administered once improved clinically. \par Anemia, hydration, back pain, constipation. \par Pt to follow with PMD regarding most of the issues mentioned  above. \par All questions answered.

## 2020-11-20 ENCOUNTER — NON-APPOINTMENT (OUTPATIENT)
Age: 70
End: 2020-11-20

## 2020-11-25 ENCOUNTER — NON-APPOINTMENT (OUTPATIENT)
Age: 70
End: 2020-11-25

## 2022-06-28 NOTE — ED PROVIDER NOTE - ATTENDING CONTRIBUTION TO CARE
[FreeTextEntry1] : wbat\par cam boot\par again advised rest from activity\par ice/elevate\par nsaids prn\par reeval 3 wks I performed a history and physical exam of the patient and discussed their management with the Advanced Care Practitioner. I reviewed the ACP's note and agree with the documented findings and plan of care. My medical decision making and observations are found above.

## 2022-09-27 NOTE — PROGRESS NOTE ADULT - SUBJECTIVE AND OBJECTIVE BOX
NEPHROLOGY-NS[facility] [phone]        Patient seen and examined in bed.  She was in good spirits         MEDICATIONS  (STANDING):  artificial  tears Solution 1 Drop(s) Both EYES two times a day  chlorhexidine 2% Cloths 1 Application(s) Topical daily  cycloSPORINE (RESTASIS) 0.05% Emulsion 1 Drop(s) Both EYES two times a day  dextrose 5%. 1000 milliLiter(s) (50 mL/Hr) IV Continuous <Continuous>  dextrose 50% Injectable 12.5 Gram(s) IV Push once  dextrose 50% Injectable 25 Gram(s) IV Push once  dextrose 50% Injectable 25 Gram(s) IV Push once  fondaparinux Injectable 2.5 milliGRAM(s) SubCutaneous daily  insulin lispro (HumaLOG) corrective regimen sliding scale   SubCutaneous three times a day before meals  insulin lispro (HumaLOG) corrective regimen sliding scale   SubCutaneous at bedtime  levothyroxine Injectable 75 MICROGram(s) IV Push at bedtime  metoprolol succinate ER 25 milliGRAM(s) Oral daily  polyethylene glycol 3350 17 Gram(s) Oral daily  prednisoLONE acetate 1% Suspension 1 Drop(s) Both EYES two times a day  predniSONE   Tablet 5 milliGRAM(s) Oral daily  senna 2 Tablet(s) Oral at bedtime      VITAL:  T(C): , Max: 36.9 (10-19-20 @ 11:59)  T(F): , Max: 98.5 (10-19-20 @ 11:59)  HR: 93 (10-20-20 @ 04:53)  BP: 106/78 (10-20-20 @ 04:53)  BP(mean): --  RR: 18 (10-20-20 @ 04:53)  SpO2: 99% (10-20-20 @ 04:53)  Wt(kg): --    I and O's:    10-19 @ 07:01  -  10-20 @ 07:00  --------------------------------------------------------  IN: 1110 mL / OUT: 500 mL / NET: 610 mL          PHYSICAL EXAM:    Constitutional: NAD  Neck:  No JVD  Respiratory: CTAB/L  Cardiovascular: S1 and S2  Gastrointestinal: BS+, soft, NT/ND  Extremities: No peripheral edema  Neurological: A/O x 3, no focal deficits  Psychiatric: Normal mood, normal affect  : No Mcbride  Skin: No rashes  Access: Not applicable    LABS:                        8.3    5.29  )-----------( 125      ( 19 Oct 2020 05:05 )             27.9     10-19    127<L>  |  97  |  8   ----------------------------<  77  3.6   |  22  |  <0.30<L>    Ca    8.1<L>      19 Oct 2020 05:05            Urine Studies:          RADIOLOGY & ADDITIONAL STUDIES:             intense desire for tobacco The Caprini score indicates this patient is at risk for a VTE event (score 3-5).  Most surgical patients in this group would benefit from pharmacologic prophylaxis.  The surgical team will determine the balance between VTE risk and bleeding risk

## 2023-10-27 ENCOUNTER — INPATIENT (INPATIENT)
Facility: HOSPITAL | Age: 73
LOS: 81 days | Discharge: HOME HEALTH | DRG: 870 | End: 2024-01-17
Attending: INTERNAL MEDICINE | Admitting: INTERNAL MEDICINE
Payer: MEDICARE

## 2023-10-27 VITALS
HEART RATE: 79 BPM | TEMPERATURE: 99 F | DIASTOLIC BLOOD PRESSURE: 68 MMHG | OXYGEN SATURATION: 100 % | RESPIRATION RATE: 20 BRPM | SYSTOLIC BLOOD PRESSURE: 120 MMHG

## 2023-10-27 DIAGNOSIS — Z98.890 OTHER SPECIFIED POSTPROCEDURAL STATES: Chronic | ICD-10-CM

## 2023-10-27 DIAGNOSIS — Z93.1 GASTROSTOMY STATUS: Chronic | ICD-10-CM

## 2023-10-27 DIAGNOSIS — Z87.898 PERSONAL HISTORY OF OTHER SPECIFIED CONDITIONS: ICD-10-CM

## 2023-10-27 LAB
ALBUMIN SERPL ELPH-MCNC: 3.3 G/DL — SIGNIFICANT CHANGE UP (ref 3.3–5)
ALBUMIN SERPL ELPH-MCNC: 3.3 G/DL — SIGNIFICANT CHANGE UP (ref 3.3–5)
ALP SERPL-CCNC: 59 U/L — SIGNIFICANT CHANGE UP (ref 40–120)
ALP SERPL-CCNC: 59 U/L — SIGNIFICANT CHANGE UP (ref 40–120)
ALT FLD-CCNC: 32 U/L — SIGNIFICANT CHANGE UP (ref 10–45)
ALT FLD-CCNC: 32 U/L — SIGNIFICANT CHANGE UP (ref 10–45)
ANION GAP SERPL CALC-SCNC: 8 MMOL/L — SIGNIFICANT CHANGE UP (ref 5–17)
ANION GAP SERPL CALC-SCNC: 8 MMOL/L — SIGNIFICANT CHANGE UP (ref 5–17)
APPEARANCE UR: CLEAR — SIGNIFICANT CHANGE UP
APPEARANCE UR: CLEAR — SIGNIFICANT CHANGE UP
APTT BLD: 30.8 SEC — SIGNIFICANT CHANGE UP (ref 24.5–35.6)
APTT BLD: 30.8 SEC — SIGNIFICANT CHANGE UP (ref 24.5–35.6)
AST SERPL-CCNC: 38 U/L — SIGNIFICANT CHANGE UP (ref 10–40)
AST SERPL-CCNC: 38 U/L — SIGNIFICANT CHANGE UP (ref 10–40)
BASOPHILS # BLD AUTO: 0.2 K/UL — SIGNIFICANT CHANGE UP (ref 0–0.2)
BASOPHILS # BLD AUTO: 0.2 K/UL — SIGNIFICANT CHANGE UP (ref 0–0.2)
BASOPHILS NFR BLD AUTO: 1.8 % — SIGNIFICANT CHANGE UP (ref 0–2)
BASOPHILS NFR BLD AUTO: 1.8 % — SIGNIFICANT CHANGE UP (ref 0–2)
BILIRUB SERPL-MCNC: 0.4 MG/DL — SIGNIFICANT CHANGE UP (ref 0.2–1.2)
BILIRUB SERPL-MCNC: 0.4 MG/DL — SIGNIFICANT CHANGE UP (ref 0.2–1.2)
BILIRUB UR-MCNC: NEGATIVE — SIGNIFICANT CHANGE UP
BILIRUB UR-MCNC: NEGATIVE — SIGNIFICANT CHANGE UP
BUN SERPL-MCNC: 32 MG/DL — HIGH (ref 7–23)
BUN SERPL-MCNC: 32 MG/DL — HIGH (ref 7–23)
CALCIUM SERPL-MCNC: 9.4 MG/DL — SIGNIFICANT CHANGE UP (ref 8.4–10.5)
CALCIUM SERPL-MCNC: 9.4 MG/DL — SIGNIFICANT CHANGE UP (ref 8.4–10.5)
CHLORIDE SERPL-SCNC: 95 MMOL/L — LOW (ref 96–108)
CHLORIDE SERPL-SCNC: 95 MMOL/L — LOW (ref 96–108)
CO2 SERPL-SCNC: 31 MMOL/L — SIGNIFICANT CHANGE UP (ref 22–31)
CO2 SERPL-SCNC: 31 MMOL/L — SIGNIFICANT CHANGE UP (ref 22–31)
COLOR SPEC: SIGNIFICANT CHANGE UP
COLOR SPEC: SIGNIFICANT CHANGE UP
CREAT SERPL-MCNC: <0.3 MG/DL — LOW (ref 0.5–1.3)
CREAT SERPL-MCNC: <0.3 MG/DL — LOW (ref 0.5–1.3)
DIFF PNL FLD: NEGATIVE — SIGNIFICANT CHANGE UP
DIFF PNL FLD: NEGATIVE — SIGNIFICANT CHANGE UP
EGFR: 113 ML/MIN/1.73M2 — SIGNIFICANT CHANGE UP
EGFR: 113 ML/MIN/1.73M2 — SIGNIFICANT CHANGE UP
EOSINOPHIL # BLD AUTO: 0 K/UL — SIGNIFICANT CHANGE UP (ref 0–0.5)
EOSINOPHIL # BLD AUTO: 0 K/UL — SIGNIFICANT CHANGE UP (ref 0–0.5)
EOSINOPHIL NFR BLD AUTO: 0 % — SIGNIFICANT CHANGE UP (ref 0–6)
EOSINOPHIL NFR BLD AUTO: 0 % — SIGNIFICANT CHANGE UP (ref 0–6)
FLUAV AG NPH QL: SIGNIFICANT CHANGE UP
FLUAV AG NPH QL: SIGNIFICANT CHANGE UP
FLUBV AG NPH QL: SIGNIFICANT CHANGE UP
FLUBV AG NPH QL: SIGNIFICANT CHANGE UP
GLUCOSE BLDC GLUCOMTR-MCNC: 127 MG/DL — HIGH (ref 70–99)
GLUCOSE BLDC GLUCOMTR-MCNC: 127 MG/DL — HIGH (ref 70–99)
GLUCOSE SERPL-MCNC: 281 MG/DL — HIGH (ref 70–99)
GLUCOSE SERPL-MCNC: 281 MG/DL — HIGH (ref 70–99)
GLUCOSE UR QL: NEGATIVE — SIGNIFICANT CHANGE UP
GLUCOSE UR QL: NEGATIVE — SIGNIFICANT CHANGE UP
HCT VFR BLD CALC: 32.5 % — LOW (ref 34.5–45)
HCT VFR BLD CALC: 32.5 % — LOW (ref 34.5–45)
HGB BLD-MCNC: 9.8 G/DL — LOW (ref 11.5–15.5)
HGB BLD-MCNC: 9.8 G/DL — LOW (ref 11.5–15.5)
INR BLD: 1.12 RATIO — SIGNIFICANT CHANGE UP (ref 0.85–1.18)
INR BLD: 1.12 RATIO — SIGNIFICANT CHANGE UP (ref 0.85–1.18)
KETONES UR-MCNC: SIGNIFICANT CHANGE UP
KETONES UR-MCNC: SIGNIFICANT CHANGE UP
LEUKOCYTE ESTERASE UR-ACNC: NEGATIVE — SIGNIFICANT CHANGE UP
LEUKOCYTE ESTERASE UR-ACNC: NEGATIVE — SIGNIFICANT CHANGE UP
LYMPHOCYTES # BLD AUTO: 0.66 K/UL — LOW (ref 1–3.3)
LYMPHOCYTES # BLD AUTO: 0.66 K/UL — LOW (ref 1–3.3)
LYMPHOCYTES # BLD AUTO: 6.1 % — LOW (ref 13–44)
LYMPHOCYTES # BLD AUTO: 6.1 % — LOW (ref 13–44)
MCHC RBC-ENTMCNC: 27.5 PG — SIGNIFICANT CHANGE UP (ref 27–34)
MCHC RBC-ENTMCNC: 27.5 PG — SIGNIFICANT CHANGE UP (ref 27–34)
MCHC RBC-ENTMCNC: 30.2 GM/DL — LOW (ref 32–36)
MCHC RBC-ENTMCNC: 30.2 GM/DL — LOW (ref 32–36)
MCV RBC AUTO: 91 FL — SIGNIFICANT CHANGE UP (ref 80–100)
MCV RBC AUTO: 91 FL — SIGNIFICANT CHANGE UP (ref 80–100)
MONOCYTES # BLD AUTO: 0 K/UL — SIGNIFICANT CHANGE UP (ref 0–0.9)
MONOCYTES # BLD AUTO: 0 K/UL — SIGNIFICANT CHANGE UP (ref 0–0.9)
MONOCYTES NFR BLD AUTO: 0 % — LOW (ref 2–14)
MONOCYTES NFR BLD AUTO: 0 % — LOW (ref 2–14)
MYELOCYTES NFR BLD: 1.7 % — HIGH (ref 0–0)
MYELOCYTES NFR BLD: 1.7 % — HIGH (ref 0–0)
NEUTROPHILS # BLD AUTO: 9.84 K/UL — HIGH (ref 1.8–7.4)
NEUTROPHILS # BLD AUTO: 9.84 K/UL — HIGH (ref 1.8–7.4)
NEUTROPHILS NFR BLD AUTO: 90.4 % — HIGH (ref 43–77)
NEUTROPHILS NFR BLD AUTO: 90.4 % — HIGH (ref 43–77)
NITRITE UR-MCNC: NEGATIVE — SIGNIFICANT CHANGE UP
NITRITE UR-MCNC: NEGATIVE — SIGNIFICANT CHANGE UP
PH UR: 7.5 — SIGNIFICANT CHANGE UP (ref 5–8)
PH UR: 7.5 — SIGNIFICANT CHANGE UP (ref 5–8)
PLAT MORPH BLD: NORMAL — SIGNIFICANT CHANGE UP
PLAT MORPH BLD: NORMAL — SIGNIFICANT CHANGE UP
PLATELET # BLD AUTO: 136 K/UL — LOW (ref 150–400)
PLATELET # BLD AUTO: 136 K/UL — LOW (ref 150–400)
POLYCHROMASIA BLD QL SMEAR: SLIGHT — SIGNIFICANT CHANGE UP
POLYCHROMASIA BLD QL SMEAR: SLIGHT — SIGNIFICANT CHANGE UP
POTASSIUM SERPL-MCNC: 4.7 MMOL/L — SIGNIFICANT CHANGE UP (ref 3.5–5.3)
POTASSIUM SERPL-MCNC: 4.7 MMOL/L — SIGNIFICANT CHANGE UP (ref 3.5–5.3)
POTASSIUM SERPL-SCNC: 4.7 MMOL/L — SIGNIFICANT CHANGE UP (ref 3.5–5.3)
POTASSIUM SERPL-SCNC: 4.7 MMOL/L — SIGNIFICANT CHANGE UP (ref 3.5–5.3)
PROT SERPL-MCNC: 7.7 G/DL — SIGNIFICANT CHANGE UP (ref 6–8.3)
PROT SERPL-MCNC: 7.7 G/DL — SIGNIFICANT CHANGE UP (ref 6–8.3)
PROT UR-MCNC: NEGATIVE — SIGNIFICANT CHANGE UP
PROT UR-MCNC: NEGATIVE — SIGNIFICANT CHANGE UP
PROTHROM AB SERPL-ACNC: 12.3 SEC — SIGNIFICANT CHANGE UP (ref 9.5–13)
PROTHROM AB SERPL-ACNC: 12.3 SEC — SIGNIFICANT CHANGE UP (ref 9.5–13)
RBC # BLD: 3.57 M/UL — LOW (ref 3.8–5.2)
RBC # BLD: 3.57 M/UL — LOW (ref 3.8–5.2)
RBC # FLD: 17.9 % — HIGH (ref 10.3–14.5)
RBC # FLD: 17.9 % — HIGH (ref 10.3–14.5)
RBC BLD AUTO: SIGNIFICANT CHANGE UP
RBC BLD AUTO: SIGNIFICANT CHANGE UP
RSV RNA NPH QL NAA+NON-PROBE: SIGNIFICANT CHANGE UP
RSV RNA NPH QL NAA+NON-PROBE: SIGNIFICANT CHANGE UP
SARS-COV-2 RNA SPEC QL NAA+PROBE: SIGNIFICANT CHANGE UP
SARS-COV-2 RNA SPEC QL NAA+PROBE: SIGNIFICANT CHANGE UP
SODIUM SERPL-SCNC: 134 MMOL/L — LOW (ref 135–145)
SODIUM SERPL-SCNC: 134 MMOL/L — LOW (ref 135–145)
SP GR SPEC: 1.02 — SIGNIFICANT CHANGE UP (ref 1.01–1.02)
SP GR SPEC: 1.02 — SIGNIFICANT CHANGE UP (ref 1.01–1.02)
UROBILINOGEN FLD QL: NEGATIVE — SIGNIFICANT CHANGE UP
UROBILINOGEN FLD QL: NEGATIVE — SIGNIFICANT CHANGE UP
WBC # BLD: 10.89 K/UL — HIGH (ref 3.8–10.5)
WBC # BLD: 10.89 K/UL — HIGH (ref 3.8–10.5)
WBC # FLD AUTO: 10.89 K/UL — HIGH (ref 3.8–10.5)
WBC # FLD AUTO: 10.89 K/UL — HIGH (ref 3.8–10.5)

## 2023-10-27 PROCEDURE — 74177 CT ABD & PELVIS W/CONTRAST: CPT | Mod: 26,MG

## 2023-10-27 PROCEDURE — 71045 X-RAY EXAM CHEST 1 VIEW: CPT | Mod: 26

## 2023-10-27 PROCEDURE — 99285 EMERGENCY DEPT VISIT HI MDM: CPT | Mod: GC

## 2023-10-27 PROCEDURE — G1004: CPT

## 2023-10-27 PROCEDURE — 70498 CT ANGIOGRAPHY NECK: CPT | Mod: 26,MG

## 2023-10-27 RX ORDER — ACETAMINOPHEN 500 MG
1000 TABLET ORAL ONCE
Refills: 0 | Status: COMPLETED | OUTPATIENT
Start: 2023-10-27 | End: 2023-10-30

## 2023-10-27 RX ORDER — DIPHENHYDRAMINE HCL 50 MG
25 CAPSULE ORAL ONCE
Refills: 0 | Status: COMPLETED | OUTPATIENT
Start: 2023-10-27 | End: 2023-10-27

## 2023-10-27 RX ORDER — CHLORHEXIDINE GLUCONATE 213 G/1000ML
15 SOLUTION TOPICAL EVERY 12 HOURS
Refills: 0 | Status: DISCONTINUED | OUTPATIENT
Start: 2023-10-27 | End: 2024-01-17

## 2023-10-27 RX ORDER — ONDANSETRON 8 MG/1
4 TABLET, FILM COATED ORAL ONCE
Refills: 0 | Status: COMPLETED | OUTPATIENT
Start: 2023-10-27 | End: 2023-10-27

## 2023-10-27 RX ORDER — MEROPENEM 1 G/30ML
1000 INJECTION INTRAVENOUS ONCE
Refills: 0 | Status: DISCONTINUED | OUTPATIENT
Start: 2023-10-27 | End: 2023-10-27

## 2023-10-27 RX ORDER — VANCOMYCIN HCL 1 G
1000 VIAL (EA) INTRAVENOUS ONCE
Refills: 0 | Status: COMPLETED | OUTPATIENT
Start: 2023-10-27 | End: 2023-10-27

## 2023-10-27 RX ORDER — ESCITALOPRAM OXALATE 10 MG/1
10 TABLET, FILM COATED ORAL DAILY
Refills: 0 | Status: DISCONTINUED | OUTPATIENT
Start: 2023-10-27 | End: 2023-10-27

## 2023-10-27 RX ORDER — ACETAMINOPHEN 500 MG
650 TABLET ORAL ONCE
Refills: 0 | Status: COMPLETED | OUTPATIENT
Start: 2023-10-27 | End: 2023-10-27

## 2023-10-27 RX ORDER — DEXTROSE 50 % IN WATER 50 %
50 SYRINGE (ML) INTRAVENOUS ONCE
Refills: 0 | Status: DISCONTINUED | OUTPATIENT
Start: 2023-10-27 | End: 2023-12-23

## 2023-10-27 RX ORDER — SODIUM CHLORIDE 9 MG/ML
500 INJECTION INTRAMUSCULAR; INTRAVENOUS; SUBCUTANEOUS ONCE
Refills: 0 | Status: COMPLETED | OUTPATIENT
Start: 2023-10-27 | End: 2023-10-27

## 2023-10-27 RX ORDER — ESCITALOPRAM OXALATE 10 MG/1
10 TABLET, FILM COATED ORAL DAILY
Refills: 0 | Status: DISCONTINUED | OUTPATIENT
Start: 2023-10-27 | End: 2023-11-21

## 2023-10-27 RX ORDER — AZTREONAM 2 G
1000 VIAL (EA) INJECTION ONCE
Refills: 0 | Status: DISCONTINUED | OUTPATIENT
Start: 2023-10-27 | End: 2023-10-28

## 2023-10-27 RX ORDER — SODIUM CHLORIDE 9 MG/ML
1000 INJECTION INTRAMUSCULAR; INTRAVENOUS; SUBCUTANEOUS ONCE
Refills: 0 | Status: COMPLETED | OUTPATIENT
Start: 2023-10-27 | End: 2023-10-27

## 2023-10-27 RX ORDER — ACETYLCYSTEINE 200 MG/ML
4 VIAL (ML) MISCELLANEOUS ONCE
Refills: 0 | Status: DISCONTINUED | OUTPATIENT
Start: 2023-10-27 | End: 2023-10-28

## 2023-10-27 RX ORDER — IPRATROPIUM/ALBUTEROL SULFATE 18-103MCG
3 AEROSOL WITH ADAPTER (GRAM) INHALATION ONCE
Refills: 0 | Status: COMPLETED | OUTPATIENT
Start: 2023-10-27 | End: 2023-10-27

## 2023-10-27 RX ORDER — ACETAMINOPHEN 500 MG
1000 TABLET ORAL ONCE
Refills: 0 | Status: DISCONTINUED | OUTPATIENT
Start: 2023-10-27 | End: 2023-10-27

## 2023-10-27 RX ADMIN — Medication 650 MILLIGRAM(S): at 16:45

## 2023-10-27 RX ADMIN — Medication 3 MILLILITER(S): at 22:45

## 2023-10-27 RX ADMIN — SODIUM CHLORIDE 1000 MILLILITER(S): 9 INJECTION INTRAMUSCULAR; INTRAVENOUS; SUBCUTANEOUS at 14:48

## 2023-10-27 RX ADMIN — CHLORHEXIDINE GLUCONATE 15 MILLILITER(S): 213 SOLUTION TOPICAL at 22:34

## 2023-10-27 RX ADMIN — Medication 260 MILLIGRAM(S): at 16:45

## 2023-10-27 RX ADMIN — Medication 250 MILLIGRAM(S): at 21:48

## 2023-10-27 RX ADMIN — Medication 25 MILLIGRAM(S): at 19:00

## 2023-10-27 NOTE — ED PROVIDER NOTE - PHYSICAL EXAMINATION
Gen: NAD, AOx3, able to make needs known, non-toxic  Head: NCAT  HEENT: EOMI, normal conjunctiva. +trach in place with no active bleeding noted   Lung: no respiratory distress, speaking in full sentences  CV: RRR, pulses bilaterally   Abd: soft, diffusely tender, +peg in place with bright red blood on surrounding gauze   MSK: no visible bony deformities  Neuro: awake and alert  Skin: Warm, well perfused, no rash  Psych: normal affect

## 2023-10-27 NOTE — ED PROVIDER NOTE - ATTENDING CONTRIBUTION TO CARE
Patient is a 73-year-old female with history of  type 2 diabetes, hyperlipidemia, hypertension, anemia, hypothyroidism, history of sacral pressure ulcer, acute respiratory failure with hypoxia  in November 2023, now with trach and PEG, bedbound, brought in by brought in by daughter for concern of bleeding at trach site, PEG tube site and rectal bleeding.  Patient is not on any anticoagulation.  Of note, patient had a fever 3 days ago.  Patient has been complaining of abdominal discomfort. Per daughter, patient had blood work done yesterday.  She called patient's physician to describe bleeding at site and abdominal discomfort and was told to come to the emergency department.    Dr. Ben Gottlieb, [phone]    VS noted  Gen:  chronically ill, bedbound  HEENT: EOMI, mmm,  Trach in place, no active bleeding at the site however gauze does have bright red blood does have bright red blood present  Lungs: CTAB/L no C/ W /R   CVS: RRR   Abd; Soft,   Mild diffuse tenderness, no rebound or guarding, PEG tube is intact.  There is no active bleeding at site, however gauze does have bright red blood present..    Sacrum: 4 cm Sacral ulcer present, C/D/, no pus, no surrounding erythema  Ext: no edema  Skin: no rash  Neuro AAOx3 non focal clear speech  a/p:  bleeding at trach/PEG tube site and abdominal discomfort.  Patient is rectally afebrile.  given history of fever, will send sepsis labs.  Will check CT of neck, CT abdomen pelvis.  - Magui YBARRA

## 2023-10-27 NOTE — ED ADULT NURSE NOTE - NSFALLRISKINTERV_ED_ALL_ED
No Assistance OOB with selected safe patient handling equipment if applicable/Assistance with ambulation/Communicate fall risk and risk factors to all staff, patient, and family/Monitor gait and stability/Provide visual cue: yellow wristband, yellow gown, etc/Reinforce activity limits and safety measures with patient and family/Call bell, personal items and telephone in reach/Instruct patient to call for assistance before getting out of bed/chair/stretcher/Non-slip footwear applied when patient is off stretcher/[place] to call system/Physically safe environment - no spills, clutter or unnecessary equipment/Purposeful Proactive Rounding/Room/bathroom lighting operational, light cord in reach

## 2023-10-27 NOTE — ED PROVIDER NOTE - OBJECTIVE STATEMENT
74yo woman with PMH RA, DM, HTN, fibromyalgia, trach and peg dependent, presenting with daughter at bedside due to bleeding trach and peg since last wednesday. +fever last wednesday. Denies fever today, chills, chest pain, SOB. trach last changed 8/17, peg last changed 8/14. 72yo woman with PMH RA, DM, HTN, fibromyalgia, acute respiratory failure, trach and peg dependent, presenting with daughter at bedside due to bleeding trach and peg x3 days. +one episode of fever. Denies fever today, chills, chest pain, SOB. trach last changed 8/17, peg last changed 8/14. Daughter spoke to patient PCP who referred to the ER.

## 2023-10-27 NOTE — ED ADULT NURSE REASSESSMENT NOTE - NS ED NURSE REASSESS COMMENT FT1
Pt straight cathed using sterile technique. 2 RNs present to confirm sterility. Pt tolerated procedure well. Sterile specimen collected. UA and Culture sent as ordered. Approx 60 cc yellow urine output noted to bag.

## 2023-10-27 NOTE — ED PROVIDER NOTE - CLINICAL SUMMARY MEDICAL DECISION MAKING FREE TEXT BOX
Magui YBARRA: 73-year-old female with history of  type 2 diabetes, hyperlipidemia, hypertension, anemia, hypothyroidism, history of sacral pressure ulcer, acute respiratory failure with hypoxia  in November 2023, now with trach and PEG, bedbound, brought in by brought in by daughter for concern of bleeding at trach site, PEG tube site and rectal bleeding.  Patient is not on any anticoagulation.  Of note, patient had a fever 3 days ago.  Patient has been complaining of abdominal discomfort. Per daughter, patient had blood work done yesterday.  She called patient's physician to describe bleeding at site and abdominal discomfort and was told to come to the emergency department. Exam significant for: Trach in place, no active bleeding at the site however gauze does have bright red blood does have bright red blood present. Abd; Soft, Mild diffuse tenderness, no rebound or guarding, PEG tube is intact.  There is no active bleeding at site, however gauze does have bright red blood present.

## 2023-10-27 NOTE — ED PROVIDER NOTE - PROGRESS NOTE DETAILS
Attempted to call Dr. Ben Gottlieb, [phone] with no answer. Attending MD Andrea.  Pt signed out to me in stable condition pending labs, imaging, reassess, 72 yo blood around trach and peg, cough, throat closing sensation and abd'l distention. Magui YBARRA: Discussed patient's condition with Dr. Gottlieb, pending CT imaging. Attending MD Andrea.  PT labs/urine/imaging consistent with concern for potential sacral decub infection.  Urine inconsistent with acute UTI, pt has not been antibiosed prior to arrival.  Planned admission for resp support, wound care of both peg and trach with scan bloody ooze and management of presumed sacral oseomyelitis.  Pt afebrile but evidence of potential tracking to bone concerning for occult source of infection.  Abxs administered.  Daughter endorsing rash to penicillins.  No anaphylaxis hx known.  Cefepime/vanc ordered for admin.  Pt stable for admission to medicine.

## 2023-10-27 NOTE — ED ADULT NURSE NOTE - OBJECTIVE STATEMENT
73 y.o F BIB EMS p/w c/o peg/ trach bleeding. A+Ox4. Per daughter, states that last Sunday pts peg tube and trach started having scant bleeding around the site. States acutely worsened last night to this morning, called MD, and was told to come to ER for further eval. On Wednesday daughter states pt febrile to 100.9 degrees F. Also states finger joints more swollen than normal. Takes Tylenol daily for RA. Reports yesterday pt stated to home nurse that her stomach felt like it was "going to burst". Upon initial assessment, scant bleeding noted  around peg tub, no bleeding noted to trach. Stage 4 sacral wound noted, clean and dry. LBM this morning, normal. Trach last changed August 17th, and peg last changed August 14th. No other complaints at this time, daughter at bedside, safety maintained.

## 2023-10-28 DIAGNOSIS — M46.28 OSTEOMYELITIS OF VERTEBRA, SACRAL AND SACROCOCCYGEAL REGION: ICD-10-CM

## 2023-10-28 DIAGNOSIS — A41.9 SEPSIS, UNSPECIFIED ORGANISM: ICD-10-CM

## 2023-10-28 DIAGNOSIS — R10.9 UNSPECIFIED ABDOMINAL PAIN: ICD-10-CM

## 2023-10-28 DIAGNOSIS — I10 ESSENTIAL (PRIMARY) HYPERTENSION: ICD-10-CM

## 2023-10-28 DIAGNOSIS — R44.3 HALLUCINATIONS, UNSPECIFIED: ICD-10-CM

## 2023-10-28 DIAGNOSIS — J96.01 ACUTE RESPIRATORY FAILURE WITH HYPOXIA: ICD-10-CM

## 2023-10-28 DIAGNOSIS — Z29.9 ENCOUNTER FOR PROPHYLACTIC MEASURES, UNSPECIFIED: ICD-10-CM

## 2023-10-28 DIAGNOSIS — M06.9 RHEUMATOID ARTHRITIS, UNSPECIFIED: ICD-10-CM

## 2023-10-28 DIAGNOSIS — E11.9 TYPE 2 DIABETES MELLITUS WITHOUT COMPLICATIONS: ICD-10-CM

## 2023-10-28 LAB
A1C WITH ESTIMATED AVERAGE GLUCOSE RESULT: 7.5 % — HIGH (ref 4–5.6)
A1C WITH ESTIMATED AVERAGE GLUCOSE RESULT: 7.5 % — HIGH (ref 4–5.6)
ALBUMIN SERPL ELPH-MCNC: 3 G/DL — LOW (ref 3.3–5)
ALBUMIN SERPL ELPH-MCNC: 3 G/DL — LOW (ref 3.3–5)
ALP SERPL-CCNC: 47 U/L — SIGNIFICANT CHANGE UP (ref 40–120)
ALP SERPL-CCNC: 47 U/L — SIGNIFICANT CHANGE UP (ref 40–120)
ALT FLD-CCNC: 23 U/L — SIGNIFICANT CHANGE UP (ref 10–45)
ALT FLD-CCNC: 23 U/L — SIGNIFICANT CHANGE UP (ref 10–45)
ANION GAP SERPL CALC-SCNC: 8 MMOL/L — SIGNIFICANT CHANGE UP (ref 5–17)
ANION GAP SERPL CALC-SCNC: 8 MMOL/L — SIGNIFICANT CHANGE UP (ref 5–17)
AST SERPL-CCNC: 21 U/L — SIGNIFICANT CHANGE UP (ref 10–40)
AST SERPL-CCNC: 21 U/L — SIGNIFICANT CHANGE UP (ref 10–40)
BASE EXCESS BLDV CALC-SCNC: 5.1 MMOL/L — HIGH (ref -2–3)
BASE EXCESS BLDV CALC-SCNC: 5.1 MMOL/L — HIGH (ref -2–3)
BILIRUB SERPL-MCNC: 0.2 MG/DL — SIGNIFICANT CHANGE UP (ref 0.2–1.2)
BILIRUB SERPL-MCNC: 0.2 MG/DL — SIGNIFICANT CHANGE UP (ref 0.2–1.2)
BUN SERPL-MCNC: 16 MG/DL — SIGNIFICANT CHANGE UP (ref 7–23)
BUN SERPL-MCNC: 16 MG/DL — SIGNIFICANT CHANGE UP (ref 7–23)
CA-I SERPL-SCNC: 1.18 MMOL/L — SIGNIFICANT CHANGE UP (ref 1.15–1.33)
CA-I SERPL-SCNC: 1.18 MMOL/L — SIGNIFICANT CHANGE UP (ref 1.15–1.33)
CALCIUM SERPL-MCNC: 8.3 MG/DL — LOW (ref 8.4–10.5)
CALCIUM SERPL-MCNC: 8.3 MG/DL — LOW (ref 8.4–10.5)
CHLORIDE BLDV-SCNC: 102 MMOL/L — SIGNIFICANT CHANGE UP (ref 96–108)
CHLORIDE BLDV-SCNC: 102 MMOL/L — SIGNIFICANT CHANGE UP (ref 96–108)
CHLORIDE SERPL-SCNC: 100 MMOL/L — SIGNIFICANT CHANGE UP (ref 96–108)
CHLORIDE SERPL-SCNC: 100 MMOL/L — SIGNIFICANT CHANGE UP (ref 96–108)
CO2 BLDV-SCNC: 32 MMOL/L — HIGH (ref 22–26)
CO2 BLDV-SCNC: 32 MMOL/L — HIGH (ref 22–26)
CO2 SERPL-SCNC: 27 MMOL/L — SIGNIFICANT CHANGE UP (ref 22–31)
CO2 SERPL-SCNC: 27 MMOL/L — SIGNIFICANT CHANGE UP (ref 22–31)
CREAT SERPL-MCNC: <0.3 MG/DL — LOW (ref 0.5–1.3)
CREAT SERPL-MCNC: <0.3 MG/DL — LOW (ref 0.5–1.3)
CULTURE RESULTS: NO GROWTH — SIGNIFICANT CHANGE UP
CULTURE RESULTS: NO GROWTH — SIGNIFICANT CHANGE UP
EGFR: 113 ML/MIN/1.73M2 — SIGNIFICANT CHANGE UP
EGFR: 113 ML/MIN/1.73M2 — SIGNIFICANT CHANGE UP
ESTIMATED AVERAGE GLUCOSE: 169 MG/DL — HIGH (ref 68–114)
ESTIMATED AVERAGE GLUCOSE: 169 MG/DL — HIGH (ref 68–114)
GAS PNL BLDV: 135 MMOL/L — LOW (ref 136–145)
GAS PNL BLDV: 135 MMOL/L — LOW (ref 136–145)
GAS PNL BLDV: SIGNIFICANT CHANGE UP
GLUCOSE BLDC GLUCOMTR-MCNC: 162 MG/DL — HIGH (ref 70–99)
GLUCOSE BLDC GLUCOMTR-MCNC: 162 MG/DL — HIGH (ref 70–99)
GLUCOSE BLDC GLUCOMTR-MCNC: 198 MG/DL — HIGH (ref 70–99)
GLUCOSE BLDC GLUCOMTR-MCNC: 198 MG/DL — HIGH (ref 70–99)
GLUCOSE BLDC GLUCOMTR-MCNC: 90 MG/DL — SIGNIFICANT CHANGE UP (ref 70–99)
GLUCOSE BLDC GLUCOMTR-MCNC: 90 MG/DL — SIGNIFICANT CHANGE UP (ref 70–99)
GLUCOSE BLDV-MCNC: 148 MG/DL — HIGH (ref 70–99)
GLUCOSE BLDV-MCNC: 148 MG/DL — HIGH (ref 70–99)
GLUCOSE SERPL-MCNC: 77 MG/DL — SIGNIFICANT CHANGE UP (ref 70–99)
GLUCOSE SERPL-MCNC: 77 MG/DL — SIGNIFICANT CHANGE UP (ref 70–99)
GRAM STN FLD: ABNORMAL
GRAM STN FLD: ABNORMAL
HCO3 BLDV-SCNC: 30 MMOL/L — HIGH (ref 22–29)
HCO3 BLDV-SCNC: 30 MMOL/L — HIGH (ref 22–29)
HCT VFR BLD CALC: 28.4 % — LOW (ref 34.5–45)
HCT VFR BLD CALC: 28.4 % — LOW (ref 34.5–45)
HCT VFR BLDA CALC: 27 % — LOW (ref 34.5–46.5)
HCT VFR BLDA CALC: 27 % — LOW (ref 34.5–46.5)
HCV AB S/CO SERPL IA: 0.26 S/CO — SIGNIFICANT CHANGE UP (ref 0–0.99)
HCV AB S/CO SERPL IA: 0.26 S/CO — SIGNIFICANT CHANGE UP (ref 0–0.99)
HCV AB SERPL-IMP: SIGNIFICANT CHANGE UP
HCV AB SERPL-IMP: SIGNIFICANT CHANGE UP
HGB BLD CALC-MCNC: 8.9 G/DL — LOW (ref 11.7–16.1)
HGB BLD CALC-MCNC: 8.9 G/DL — LOW (ref 11.7–16.1)
HGB BLD-MCNC: 8.5 G/DL — LOW (ref 11.5–15.5)
HGB BLD-MCNC: 8.5 G/DL — LOW (ref 11.5–15.5)
LACTATE BLDV-MCNC: 1.3 MMOL/L — SIGNIFICANT CHANGE UP (ref 0.5–2)
LACTATE BLDV-MCNC: 1.3 MMOL/L — SIGNIFICANT CHANGE UP (ref 0.5–2)
LEGIONELLA AG UR QL: NEGATIVE — SIGNIFICANT CHANGE UP
LEGIONELLA AG UR QL: NEGATIVE — SIGNIFICANT CHANGE UP
MAGNESIUM SERPL-MCNC: 1.9 MG/DL — SIGNIFICANT CHANGE UP (ref 1.6–2.6)
MAGNESIUM SERPL-MCNC: 1.9 MG/DL — SIGNIFICANT CHANGE UP (ref 1.6–2.6)
MCHC RBC-ENTMCNC: 27.6 PG — SIGNIFICANT CHANGE UP (ref 27–34)
MCHC RBC-ENTMCNC: 27.6 PG — SIGNIFICANT CHANGE UP (ref 27–34)
MCHC RBC-ENTMCNC: 29.9 GM/DL — LOW (ref 32–36)
MCHC RBC-ENTMCNC: 29.9 GM/DL — LOW (ref 32–36)
MCV RBC AUTO: 92.2 FL — SIGNIFICANT CHANGE UP (ref 80–100)
MCV RBC AUTO: 92.2 FL — SIGNIFICANT CHANGE UP (ref 80–100)
NRBC # BLD: 0 /100 WBCS — SIGNIFICANT CHANGE UP (ref 0–0)
NRBC # BLD: 0 /100 WBCS — SIGNIFICANT CHANGE UP (ref 0–0)
PCO2 BLDV: 48 MMHG — HIGH (ref 39–42)
PCO2 BLDV: 48 MMHG — HIGH (ref 39–42)
PH BLDV: 7.41 — SIGNIFICANT CHANGE UP (ref 7.32–7.43)
PH BLDV: 7.41 — SIGNIFICANT CHANGE UP (ref 7.32–7.43)
PHOSPHATE SERPL-MCNC: 3.2 MG/DL — SIGNIFICANT CHANGE UP (ref 2.5–4.5)
PHOSPHATE SERPL-MCNC: 3.2 MG/DL — SIGNIFICANT CHANGE UP (ref 2.5–4.5)
PLATELET # BLD AUTO: 117 K/UL — LOW (ref 150–400)
PLATELET # BLD AUTO: 117 K/UL — LOW (ref 150–400)
PO2 BLDV: 43 MMHG — SIGNIFICANT CHANGE UP (ref 25–45)
PO2 BLDV: 43 MMHG — SIGNIFICANT CHANGE UP (ref 25–45)
POTASSIUM BLDV-SCNC: 3.7 MMOL/L — SIGNIFICANT CHANGE UP (ref 3.5–5.1)
POTASSIUM BLDV-SCNC: 3.7 MMOL/L — SIGNIFICANT CHANGE UP (ref 3.5–5.1)
POTASSIUM SERPL-MCNC: 3.3 MMOL/L — LOW (ref 3.5–5.3)
POTASSIUM SERPL-MCNC: 3.3 MMOL/L — LOW (ref 3.5–5.3)
POTASSIUM SERPL-SCNC: 3.3 MMOL/L — LOW (ref 3.5–5.3)
POTASSIUM SERPL-SCNC: 3.3 MMOL/L — LOW (ref 3.5–5.3)
PROT SERPL-MCNC: 6.7 G/DL — SIGNIFICANT CHANGE UP (ref 6–8.3)
PROT SERPL-MCNC: 6.7 G/DL — SIGNIFICANT CHANGE UP (ref 6–8.3)
RAPID RVP RESULT: SIGNIFICANT CHANGE UP
RAPID RVP RESULT: SIGNIFICANT CHANGE UP
RBC # BLD: 3.08 M/UL — LOW (ref 3.8–5.2)
RBC # BLD: 3.08 M/UL — LOW (ref 3.8–5.2)
RBC # FLD: 17.9 % — HIGH (ref 10.3–14.5)
RBC # FLD: 17.9 % — HIGH (ref 10.3–14.5)
SAO2 % BLDV: 74.8 % — SIGNIFICANT CHANGE UP (ref 67–88)
SAO2 % BLDV: 74.8 % — SIGNIFICANT CHANGE UP (ref 67–88)
SARS-COV-2 RNA SPEC QL NAA+PROBE: SIGNIFICANT CHANGE UP
SARS-COV-2 RNA SPEC QL NAA+PROBE: SIGNIFICANT CHANGE UP
SODIUM SERPL-SCNC: 135 MMOL/L — SIGNIFICANT CHANGE UP (ref 135–145)
SODIUM SERPL-SCNC: 135 MMOL/L — SIGNIFICANT CHANGE UP (ref 135–145)
SPECIMEN SOURCE: SIGNIFICANT CHANGE UP
TSH SERPL-MCNC: 9.03 UIU/ML — HIGH (ref 0.27–4.2)
TSH SERPL-MCNC: 9.03 UIU/ML — HIGH (ref 0.27–4.2)
WBC # BLD: 6.07 K/UL — SIGNIFICANT CHANGE UP (ref 3.8–10.5)
WBC # BLD: 6.07 K/UL — SIGNIFICANT CHANGE UP (ref 3.8–10.5)
WBC # FLD AUTO: 6.07 K/UL — SIGNIFICANT CHANGE UP (ref 3.8–10.5)
WBC # FLD AUTO: 6.07 K/UL — SIGNIFICANT CHANGE UP (ref 3.8–10.5)

## 2023-10-28 PROCEDURE — 99223 1ST HOSP IP/OBS HIGH 75: CPT | Mod: GC

## 2023-10-28 RX ORDER — VANCOMYCIN HCL 1 G
1000 VIAL (EA) INTRAVENOUS EVERY 12 HOURS
Refills: 0 | Status: DISCONTINUED | OUTPATIENT
Start: 2023-10-28 | End: 2023-10-28

## 2023-10-28 RX ORDER — MULTIVIT-MIN/FERROUS GLUCONATE 9 MG/15 ML
15 LIQUID (ML) ORAL DAILY
Refills: 0 | Status: DISCONTINUED | OUTPATIENT
Start: 2023-10-28 | End: 2024-01-17

## 2023-10-28 RX ORDER — VANCOMYCIN HCL 1 G
1000 VIAL (EA) INTRAVENOUS EVERY 12 HOURS
Refills: 0 | Status: DISCONTINUED | OUTPATIENT
Start: 2023-10-29 | End: 2023-10-30

## 2023-10-28 RX ORDER — CHLORHEXIDINE GLUCONATE 213 G/1000ML
1 SOLUTION TOPICAL
Refills: 0 | Status: DISCONTINUED | OUTPATIENT
Start: 2023-10-28 | End: 2024-01-17

## 2023-10-28 RX ORDER — AZTREONAM 2 G
1000 VIAL (EA) INJECTION EVERY 8 HOURS
Refills: 0 | Status: DISCONTINUED | OUTPATIENT
Start: 2023-10-28 | End: 2023-10-30

## 2023-10-28 RX ORDER — FERROUS SULFATE 325(65) MG
300 TABLET ORAL ONCE
Refills: 0 | Status: COMPLETED | OUTPATIENT
Start: 2023-10-28 | End: 2023-10-28

## 2023-10-28 RX ORDER — MEROPENEM 1 G/30ML
1000 INJECTION INTRAVENOUS ONCE
Refills: 0 | Status: DISCONTINUED | OUTPATIENT
Start: 2023-10-28 | End: 2023-10-28

## 2023-10-28 RX ORDER — POTASSIUM CHLORIDE 20 MEQ
40 PACKET (EA) ORAL ONCE
Refills: 0 | Status: COMPLETED | OUTPATIENT
Start: 2023-10-28 | End: 2023-10-28

## 2023-10-28 RX ORDER — POTASSIUM CHLORIDE 20 MEQ
40 PACKET (EA) ORAL ONCE
Refills: 0 | Status: DISCONTINUED | OUTPATIENT
Start: 2023-10-28 | End: 2023-10-28

## 2023-10-28 RX ORDER — MEROPENEM 1 G/30ML
1000 INJECTION INTRAVENOUS EVERY 8 HOURS
Refills: 0 | Status: DISCONTINUED | OUTPATIENT
Start: 2023-10-28 | End: 2023-10-28

## 2023-10-28 RX ORDER — VANCOMYCIN HCL 1 G
VIAL (EA) INTRAVENOUS
Refills: 0 | Status: DISCONTINUED | OUTPATIENT
Start: 2023-10-28 | End: 2023-10-30

## 2023-10-28 RX ORDER — SODIUM CHLORIDE 0.65 %
2 AEROSOL, SPRAY (ML) NASAL
Refills: 0 | Status: DISCONTINUED | OUTPATIENT
Start: 2023-10-28 | End: 2023-11-10

## 2023-10-28 RX ORDER — LEVOTHYROXINE SODIUM 125 MCG
100 TABLET ORAL DAILY
Refills: 0 | Status: DISCONTINUED | OUTPATIENT
Start: 2023-10-28 | End: 2023-11-04

## 2023-10-28 RX ORDER — ACETYLCYSTEINE 200 MG/ML
4 VIAL (ML) MISCELLANEOUS EVERY 6 HOURS
Refills: 0 | Status: DISCONTINUED | OUTPATIENT
Start: 2023-10-28 | End: 2023-10-28

## 2023-10-28 RX ORDER — PANTOPRAZOLE SODIUM 20 MG/1
40 TABLET, DELAYED RELEASE ORAL DAILY
Refills: 0 | Status: DISCONTINUED | OUTPATIENT
Start: 2023-10-28 | End: 2023-11-21

## 2023-10-28 RX ORDER — ESCITALOPRAM OXALATE 10 MG/1
5 TABLET, FILM COATED ORAL DAILY
Refills: 0 | Status: DISCONTINUED | OUTPATIENT
Start: 2023-10-28 | End: 2023-10-28

## 2023-10-28 RX ORDER — AZTREONAM 2 G
1000 VIAL (EA) INJECTION ONCE
Refills: 0 | Status: COMPLETED | OUTPATIENT
Start: 2023-10-28 | End: 2023-10-28

## 2023-10-28 RX ORDER — CALCIUM CARBONATE 500(1250)
1 TABLET ORAL EVERY 12 HOURS
Refills: 0 | Status: DISCONTINUED | OUTPATIENT
Start: 2023-10-28 | End: 2024-01-17

## 2023-10-28 RX ORDER — ZINC SULFATE TAB 220 MG (50 MG ZINC EQUIVALENT) 220 (50 ZN) MG
220 TAB ORAL DAILY
Refills: 0 | Status: DISCONTINUED | OUTPATIENT
Start: 2023-10-28 | End: 2023-11-22

## 2023-10-28 RX ORDER — SODIUM CHLORIDE 9 MG/ML
4 INJECTION INTRAMUSCULAR; INTRAVENOUS; SUBCUTANEOUS EVERY 6 HOURS
Refills: 0 | Status: DISCONTINUED | OUTPATIENT
Start: 2023-10-28 | End: 2023-10-30

## 2023-10-28 RX ORDER — IPRATROPIUM/ALBUTEROL SULFATE 18-103MCG
3 AEROSOL WITH ADAPTER (GRAM) INHALATION EVERY 6 HOURS
Refills: 0 | Status: DISCONTINUED | OUTPATIENT
Start: 2023-10-28 | End: 2023-11-01

## 2023-10-28 RX ORDER — QUETIAPINE FUMARATE 200 MG/1
12.5 TABLET, FILM COATED ORAL AT BEDTIME
Refills: 0 | Status: DISCONTINUED | OUTPATIENT
Start: 2023-10-28 | End: 2023-11-07

## 2023-10-28 RX ORDER — MEROPENEM 1 G/30ML
INJECTION INTRAVENOUS
Refills: 0 | Status: DISCONTINUED | OUTPATIENT
Start: 2023-10-28 | End: 2023-10-28

## 2023-10-28 RX ORDER — INSULIN LISPRO 100/ML
VIAL (ML) SUBCUTANEOUS EVERY 6 HOURS
Refills: 0 | Status: DISCONTINUED | OUTPATIENT
Start: 2023-10-28 | End: 2023-10-30

## 2023-10-28 RX ORDER — VANCOMYCIN HCL 1 G
1000 VIAL (EA) INTRAVENOUS ONCE
Refills: 0 | Status: COMPLETED | OUTPATIENT
Start: 2023-10-28 | End: 2023-10-28

## 2023-10-28 RX ORDER — ACETAMINOPHEN 500 MG
650 TABLET ORAL ONCE
Refills: 0 | Status: COMPLETED | OUTPATIENT
Start: 2023-10-28 | End: 2023-10-28

## 2023-10-28 RX ORDER — SIMETHICONE 80 MG/1
80 TABLET, CHEWABLE ORAL
Refills: 0 | Status: DISCONTINUED | OUTPATIENT
Start: 2023-10-28 | End: 2024-01-17

## 2023-10-28 RX ORDER — GABAPENTIN 400 MG/1
100 CAPSULE ORAL AT BEDTIME
Refills: 0 | Status: DISCONTINUED | OUTPATIENT
Start: 2023-10-28 | End: 2024-01-09

## 2023-10-28 RX ORDER — AZTREONAM 2 G
VIAL (EA) INJECTION
Refills: 0 | Status: DISCONTINUED | OUTPATIENT
Start: 2023-10-28 | End: 2023-10-30

## 2023-10-28 RX ADMIN — Medication 4 MILLILITER(S): at 11:37

## 2023-10-28 RX ADMIN — Medication 250 MILLIGRAM(S): at 11:39

## 2023-10-28 RX ADMIN — Medication 50 MILLIGRAM(S): at 05:53

## 2023-10-28 RX ADMIN — Medication 50 MILLIGRAM(S): at 01:42

## 2023-10-28 RX ADMIN — Medication 3 MILLILITER(S): at 05:53

## 2023-10-28 RX ADMIN — Medication 1: at 18:58

## 2023-10-28 RX ADMIN — Medication 300 MILLIGRAM(S): at 17:42

## 2023-10-28 RX ADMIN — CHLORHEXIDINE GLUCONATE 1 APPLICATION(S): 213 SOLUTION TOPICAL at 22:48

## 2023-10-28 RX ADMIN — Medication 1 TABLET(S): at 05:54

## 2023-10-28 RX ADMIN — SIMETHICONE 80 MILLIGRAM(S): 80 TABLET, CHEWABLE ORAL at 17:36

## 2023-10-28 RX ADMIN — CHLORHEXIDINE GLUCONATE 15 MILLILITER(S): 213 SOLUTION TOPICAL at 17:36

## 2023-10-28 RX ADMIN — Medication 5 MILLIGRAM(S): at 05:53

## 2023-10-28 RX ADMIN — Medication 4 MILLILITER(S): at 05:53

## 2023-10-28 RX ADMIN — Medication 1 TABLET(S): at 17:36

## 2023-10-28 RX ADMIN — ESCITALOPRAM OXALATE 10 MILLIGRAM(S): 10 TABLET, FILM COATED ORAL at 11:38

## 2023-10-28 RX ADMIN — SODIUM CHLORIDE 4 MILLILITER(S): 9 INJECTION INTRAMUSCULAR; INTRAVENOUS; SUBCUTANEOUS at 17:41

## 2023-10-28 RX ADMIN — GABAPENTIN 100 MILLIGRAM(S): 400 CAPSULE ORAL at 22:47

## 2023-10-28 RX ADMIN — SODIUM CHLORIDE 4 MILLILITER(S): 9 INJECTION INTRAMUSCULAR; INTRAVENOUS; SUBCUTANEOUS at 11:37

## 2023-10-28 RX ADMIN — QUETIAPINE FUMARATE 12.5 MILLIGRAM(S): 200 TABLET, FILM COATED ORAL at 22:48

## 2023-10-28 RX ADMIN — Medication 1: at 12:46

## 2023-10-28 RX ADMIN — SODIUM CHLORIDE 4 MILLILITER(S): 9 INJECTION INTRAMUSCULAR; INTRAVENOUS; SUBCUTANEOUS at 23:06

## 2023-10-28 RX ADMIN — Medication 650 MILLIGRAM(S): at 23:29

## 2023-10-28 RX ADMIN — Medication 3 MILLILITER(S): at 11:37

## 2023-10-28 RX ADMIN — Medication 50 MILLIGRAM(S): at 22:47

## 2023-10-28 RX ADMIN — SODIUM CHLORIDE 500 MILLILITER(S): 9 INJECTION INTRAMUSCULAR; INTRAVENOUS; SUBCUTANEOUS at 00:42

## 2023-10-28 RX ADMIN — Medication 250 MILLIGRAM(S): at 23:29

## 2023-10-28 RX ADMIN — CHLORHEXIDINE GLUCONATE 15 MILLILITER(S): 213 SOLUTION TOPICAL at 05:53

## 2023-10-28 RX ADMIN — Medication 40 MILLIEQUIVALENT(S): at 11:39

## 2023-10-28 RX ADMIN — Medication 15 MILLILITER(S): at 11:38

## 2023-10-28 RX ADMIN — Medication 3 MILLILITER(S): at 23:06

## 2023-10-28 RX ADMIN — Medication 50 MILLIGRAM(S): at 14:31

## 2023-10-28 RX ADMIN — SODIUM CHLORIDE 4 MILLILITER(S): 9 INJECTION INTRAMUSCULAR; INTRAVENOUS; SUBCUTANEOUS at 05:53

## 2023-10-28 RX ADMIN — Medication 2 DROP(S): at 18:57

## 2023-10-28 RX ADMIN — ZINC SULFATE TAB 220 MG (50 MG ZINC EQUIVALENT) 220 MILLIGRAM(S): 220 (50 ZN) TAB at 11:38

## 2023-10-28 RX ADMIN — Medication 3 MILLILITER(S): at 17:41

## 2023-10-28 NOTE — PROGRESS NOTE ADULT - ASSESSMENT
72 y/o F with PMHx of T2DM, HTN, HLD, anemia, hypothyroidism, RA, fibromyalgia, remote cerebral aneurysm repair, acute hypercapnic respiratory failure now with tracheostomy, PEG tube, and bedbound (trach change 8/18, PEG change 8/14), sacral pressure ulcer, BIBEMS from home for 6 day hx of bleeding from the tracheostomy and PEG tube with associated abdominal pain, recent history of auditory and visual hallucinations, and with chronic sacral decubitus ulcer. Exam notable for mild abdominal distention with LLQ and RLQ tenderness, tracheostomy in place with no obvious bleeding, PEG tube with scant amount of dried blood, at baseline neurologic status. Imaging showing no active bleeding around tracheostomy site, however was notable for mild thickening along PEG tube tract, sacral ulcer extending to the coccyx suggestive of possible osteomyelitis, and bibasilar patchy lung opacities with volume loss. Patient admitted for respiratory support, wound care of tracheostomy and PEG, and management of presumed sacral osteomyelitis.    10/28: Accepted from MICU overnight from ER; Chronic vent dependent from home; Stable on home vent settings. Started Empiric Abx for suspected PNA and possible sacral OM in setting of chronic sacral decubiti. Daughter c/o intermittent oozing (bleeding) from trach/peg site. CT imaging performed, no active bleeding source noted. F/u all Cultures. ID and GI consults called. Will consider ent consult on Mon if further trach bleed. Call Psych cx on Mon.

## 2023-10-28 NOTE — PROGRESS NOTE ADULT - PROBLEM SELECTOR PLAN 5
- Hold home amlodipine held on admission, may resume if BP remains stable   - Echo from 10/17/2020 notable for hyperdynamic L ventricular systolic function, moderately severe LVOT obstruction, and EF 81% (per Wilkinson calculation) vs 77% (per Teicholtz calculation)

## 2023-10-28 NOTE — PROGRESS NOTE ADULT - PROBLEM SELECTOR PLAN 6
-Continue home Prednisone regimen  *fibromyalgia  -continue home Gabapentin 100mg daily  -continue home Fentanyl patches

## 2023-10-28 NOTE — PROGRESS NOTE ADULT - SUBJECTIVE AND OBJECTIVE BOX
Patient is a 73y old  Female who presents with a chief complaint of     Interval Events: Received from Emergency Department     REVIEW OF SYSTEMS:  [ ] Positive  [ ] All other systems negative  [ ] Unable to assess ROS because ________    Vital Signs Last 24 Hrs  T(C): 37.1 (10-28-23 @ 18:00), Max: 37.1 (10-28-23 @ 01:44)  T(F): 98.7 (10-28-23 @ 18:00), Max: 98.7 (10-28-23 @ 01:44)  HR: 71 (10-28-23 @ 18:55) (63 - 90)  BP: 126/56 (10-28-23 @ 18:00) (109/52 - 161/72)  RR: 18 (10-28-23 @ 18:00) (12 - 20)  SpO2: 100% (10-28-23 @ 18:55) (95% - 100%)    PHYSICAL EXAM:  HEENT:   [ ]Tracheostomy:  [ ]Pupils equal  [ ]No oral lesions  [ ]Abnormal        SKIN  [ ]No Rash  [ ] Abnormal  [ ] pressure    CARDIAC  [ ]Regular  [ ]Abnormal    PULMONARY  [ ]Bilateral Clear Breath Sounds  [ ]Normal Excursion  [ ]Abnormal    GI  [ ]PEG      [ ] +BS		              [ ]Soft, nondistended, nontender	  [ ]Abnormal    MUSCULOSKELETAL                                   [ ]Bedbound                 [ ]Abnormal    [ ]Ambulatory/OOB to chair                           EXTREMITIES                                         [ ]Normal  [ ]Edema                           NEUROLOGIC  [ ] Normal, non focal  [ ] Focal findings:    PSYCHIATRIC  [ ]Alert and appropriate  [ ] Sedated	 [ ]Agitated    :  Mcbride: [ ] Yes, if yes: Date of Placement:                   [  ] No    LINES: Central Lines [ ] Yes, if yes: Date of Placement                                     [  ] No    HOSPITAL MEDICATIONS:  MEDICATIONS  (STANDING):  acetaminophen   IVPB .. 1000 milliGRAM(s) IV Intermittent once  acetylcysteine 10%  Inhalation 4 milliLiter(s) Inhalation once  albuterol/ipratropium for Nebulization 3 milliLiter(s) Nebulizer every 6 hours  artificial  tears Solution 2 Drop(s) Both EYES every 6 hours  aztreonam  IVPB      aztreonam  IVPB 1000 milliGRAM(s) IV Intermittent every 8 hours  calcium carbonate    500 mG (Tums) Chewable 1 Tablet(s) Chew every 12 hours  chlorhexidine 0.12% Liquid 15 milliLiter(s) Oral Mucosa every 12 hours  chlorhexidine 4% Liquid 1 Application(s) Topical <User Schedule>  dextrose 50% Injectable 50 milliLiter(s) IV Push once  escitalopram 10 milliGRAM(s) Oral daily  fentaNYL   Patch  25 MICROgram(s)/Hr 1 Patch Transdermal every 72 hours  gabapentin Solution 100 milliGRAM(s) Enteral Tube at bedtime  insulin lispro (ADMELOG) corrective regimen sliding scale   SubCutaneous every 6 hours  levothyroxine 100 MICROGram(s) Enteral Tube daily  multivitamin/minerals/iron Oral Solution (CENTRUM) 15 milliLiter(s) Oral daily  pantoprazole   Suspension 40 milliGRAM(s) Enteral Tube daily  predniSONE   Tablet 5 milliGRAM(s) Oral daily  QUEtiapine 12.5 milliGRAM(s) Oral at bedtime  simethicone 80 milliGRAM(s) Chew four times a day  sodium chloride 3%  Inhalation 4 milliLiter(s) Inhalation every 6 hours  vancomycin  IVPB 1000 milliGRAM(s) IV Intermittent every 12 hours  zinc sulfate 220 milliGRAM(s) Oral daily    MEDICATIONS  (PRN):  sodium chloride 0.65% Nasal 2 Spray(s) Both Nostrils two times a day PRN Nasal Congestion      LABS:                        8.5    6.07  )-----------( 117      ( 28 Oct 2023 07:19 )             28.4     10-28    135  |  100  |  16  ----------------------------<  77  3.3<L>   |  27  |  <0.30<L>    Ca    8.3<L>      28 Oct 2023 07:19  Phos  3.2     10-28  Mg     1.9     10-28    TPro  6.7  /  Alb  3.0<L>  /  TBili  0.2  /  DBili  x   /  AST  21  /  ALT  23  /  AlkPhos  47  10-28    PT/INR - ( 27 Oct 2023 14:39 )   PT: 12.3 sec;   INR: 1.12 ratio         PTT - ( 27 Oct 2023 14:39 )  PTT:30.8 sec  Urinalysis Basic - ( 28 Oct 2023 07:19 )    Color: x / Appearance: x / SG: x / pH: x  Gluc: 77 mg/dL / Ketone: x  / Bili: x / Urobili: x   Blood: x / Protein: x / Nitrite: x   Leuk Esterase: x / RBC: x / WBC x   Sq Epi: x / Non Sq Epi: x / Bacteria: x        Venous Blood Gas:  10-28 @ 11:19  7.41/48/43/30/74.8  VBG Lactate: 1.3  Venous Blood Gas:  10-27 @ 14:00  7.38/59/56/35/87.4  VBG Lactate: 2.1    CAPILLARY BLOOD GLUCOSE    MICROBIOLOGY:     RADIOLOGY:  [ ] Reviewed and interpreted by me    Mode: AC/ CMV (Assist Control/ Continuous Mandatory Ventilation)  RR (machine): 12  TV (machine): 300  FiO2: 30  PEEP: 5  ITime: 1  MAP: 11  PIP: 25

## 2023-10-28 NOTE — H&P ADULT - HISTORY OF PRESENT ILLNESS
72 y/o F with PMHx of T2DM, HTN, HLD, anemia, hypothyroidism, RA, fibromyalgia, remote cerebral aneurysm repair, acute hypercapnic respiratory failure now with tracheostomy, PEG tube, and bedbound (trach change 8/18, PEG change 8/14), sacral pressure ulcer, BIBEMS from home for 6 day hx of bleeding from the tracheostomy and PEG tube with associated abdominal pain. Patient still having regular bowel movements, last one occurred prior to ED arrival. Was seen outpatient on 10/26 by critical care Dr. Zaki Gottlieb and apparently had cultures sent at that time. Patient also noted to have chronic sacral decubitous ulcer. Family endorsed one episode of fever, though was not febrile on evaluation. Daughter noted patient was recently experiencing auditory and visual hallucinations (seeing animals that were not there & hearing a "bomb" going off outside her home), though patient not actively hallucinating on evaluation.    ED course notable for CT neck imaging showing no evidence of active bleeding around the tracheostomy site, no hematomas, and no drainable collection around the tracheostomy site. CT abdomen/pelvis notable for gastrostomy tube localized to the stomach with mild thickening noted along the tract; a sacral decubitus ulcer extending to the coccyx with osseous remodeling, possibly representing osteomyelitis; and bibasilar patchy opacities with volume loss in the lungs, representing atelectasis vs infection. Patient admitted for respiratory support, wound care of tracheostomy and PEG, and management of presumed sacral osteomyelitis.  
73F T2DM, HTN, HLD, anemia, hypothyroidism, RA, FM, remote cerebral aneurysm repair, AHRF 11/2023 now w/ trach/PEG and bedbound (trach change 8/17, PEG change 8/14), sacral pressure ulcer, BIBEMS from home accompanied by daughter for beginning 10/22 blood visualized at trach, PEG sites, rectum w/ fever 10/25 (tmax 100.9F)  and abd discomfort.     VS: afebrile tmax 37.1C, HR 70s-80s, BP 110s-130/50s-70s, RR 17-20, SpO2%   Labs: , neutrophil predominant leukocytosis 10.89, normocytic (MCV 91) anemia H/H 9.8/32.5, thrombocytopenia 136, BUN 32 (SCr < 0.30); VBG 7.38/59/56/35 lact 2.1; ESR, BCx x 2, UCx in lab  Micro: UA not c/f infxn; COV/Flu/RSV not det  Imaging:   CTA neck IC: no e/o active bleed around trach site, no hematoma, trach w/o e/o discrete drainable collection around site   CXR: Bibasilar patchy opacities, likely atelectasis  CT AP IC:   - G tube localized to stomach, mild thickening along tract, punctate hyperdense focus along tract 2/2 post-op change vs bleeding focus vs hyperemia  - sacral decub ulcer extending to coccyx w/ osseous remodeling, recs pelvic MR/bone scan to exclude OM  - bibasilar patchy lower thorax/lingular consolidative opacities with   volume loss, concerning for atelectasis or infection  Received: tyl 650mg IV, benadryl 25mg IV, vanc ordered not yet given, meropenem ordered then dc'd by ED    admit to medicine for further eval/mgt

## 2023-10-28 NOTE — PROGRESS NOTE ADULT - PROBLEM SELECTOR PLAN 7
-Home Levemir 14 units in morning held on admission as noted w/ hypoglycemia   -Continue home regimen with sliding scale

## 2023-10-28 NOTE — CONSULT NOTE ADULT - SUBJECTIVE AND OBJECTIVE BOX
Patient is a 73y old  Female who presents with a chief complaint of     HPI:  72 y/o F with PMHx of T2DM, HTN, HLD, anemia, hypothyroidism, RA, fibromyalgia, remote cerebral aneurysm repair, acute hypercapnic respiratory failure now with tracheostomy, PEG tube, and bedbound (trach change 8/18, PEG change 8/14), sacral pressure ulcer, BIBEMS from home for 6 day hx of bleeding from the tracheostomy and PEG tube with associated abdominal pain. Patient still having regular bowel movements, last one occurred prior to ED arrival. Was seen outpatient on 10/26 by critical care Dr. Zaki Gottlieb and apparently had cultures sent at that time. Patient also noted to have chronic sacral decubitous ulcer. Family endorsed one episode of fever, though was not febrile on evaluation. Daughter noted patient was recently experiencing auditory and visual hallucinations (seeing animals that were not there & hearing a "bomb" going off outside her home), though patient not actively hallucinating on evaluation.    ED course notable for CT neck imaging showing no evidence of active bleeding around the tracheostomy site, no hematomas, and no drainable collection around the tracheostomy site. CT abdomen/pelvis notable for gastrostomy tube localized to the stomach with mild thickening noted along the tract; a sacral decubitus ulcer extending to the coccyx with osseous remodeling, possibly representing osteomyelitis; and bibasilar patchy opacities with volume loss in the lungs, representing atelectasis vs infection. Patient admitted for respiratory support, wound care of tracheostomy and PEG, and management of presumed sacral osteomyelitis.   (28 Oct 2023 04:18)    Patient is at baseline mental state. On [facility] vent. Afebrile, no distress noted.    PAST MEDICAL & SURGICAL HISTORY:  Diabetes      Rheumatoid arthritis      Fibromyalgia      Hypothyroid      Hypertension      H/O tracheostomy      PEG (percutaneous endoscopic gastrostomy) status          Review of Systems:   CONSTITUTIONAL: No fever, weight loss, or fatigue  EYES: No eye pain, visual disturbances, or discharge  ENMT:  No difficulty hearing, tinnitus, vertigo; No sinus or throat pain  NECK: No pain or stiffness  BREASTS: No pain, masses, or nipple discharge  RESPIRATORY: No cough, wheezing, chills or hemoptysis; No shortness of breath  CARDIOVASCULAR: No chest pain, palpitations, dizziness, or leg swelling  GASTROINTESTINAL: No abdominal or epigastric pain. No nausea, vomiting, or hematemesis; No diarrhea or constipation. No melena or hematochezia.  GENITOURINARY: No dysuria, frequency, hematuria, or incontinence  NEUROLOGICAL: Bed bound, functionally quadriplegic  SKIN: No itching, burning, rashes, or lesions   LYMPH NODES: No enlarged glands  ENDOCRINE: No heat or cold intolerance; No hair loss  MUSCULOSKELETAL: No joint pain or swelling; No muscle, back, or extremity pain  PSYCHIATRIC: No depression, anxiety, mood swings, or difficulty sleeping  HEME/LYMPH: No easy bruising, or bleeding gums  ALLERY AND IMMUNOLOGIC: No hives or eczema    Allergies    penicillin (Unknown)  heparin (Unknown)  Tagamet (Unknown)  pineapple (Unknown)  walnut (Unknown)  Pecan, Filbert, Hazelnut (Unknown)  Lyrica (Unknown)    Intolerances        Social History:     FAMILY HISTORY:      MEDICATIONS  (STANDING):  acetaminophen   IVPB .. 1000 milliGRAM(s) IV Intermittent once  acetylcysteine 10%  Inhalation 4 milliLiter(s) Inhalation once  albuterol/ipratropium for Nebulization 3 milliLiter(s) Nebulizer every 6 hours  artificial  tears Solution 2 Drop(s) Both EYES every 6 hours  aztreonam  IVPB      aztreonam  IVPB 1000 milliGRAM(s) IV Intermittent every 8 hours  calcium carbonate    500 mG (Tums) Chewable 1 Tablet(s) Chew every 12 hours  chlorhexidine 0.12% Liquid 15 milliLiter(s) Oral Mucosa every 12 hours  chlorhexidine 4% Liquid 1 Application(s) Topical <User Schedule>  dextrose 50% Injectable 50 milliLiter(s) IV Push once  escitalopram 10 milliGRAM(s) Oral daily  fentaNYL   Patch  25 MICROgram(s)/Hr 1 Patch Transdermal every 72 hours  gabapentin Solution 100 milliGRAM(s) Enteral Tube at bedtime  insulin lispro (ADMELOG) corrective regimen sliding scale   SubCutaneous every 6 hours  levothyroxine 100 MICROGram(s) Enteral Tube daily  multivitamin/minerals/iron Oral Solution (CENTRUM) 15 milliLiter(s) Oral daily  pantoprazole   Suspension 40 milliGRAM(s) Enteral Tube daily  predniSONE   Tablet 5 milliGRAM(s) Oral daily  QUEtiapine 12.5 milliGRAM(s) Oral at bedtime  simethicone 80 milliGRAM(s) Chew four times a day  sodium chloride 3%  Inhalation 4 milliLiter(s) Inhalation every 6 hours  vancomycin  IVPB 1000 milliGRAM(s) IV Intermittent every 12 hours  zinc sulfate 220 milliGRAM(s) Oral daily    MEDICATIONS  (PRN):  sodium chloride 0.65% Nasal 2 Spray(s) Both Nostrils two times a day PRN Nasal Congestion        CAPILLARY BLOOD GLUCOSE      POCT Blood Glucose.: 162 mg/dL (28 Oct 2023 17:22)  POCT Blood Glucose.: 198 mg/dL (28 Oct 2023 12:03)  POCT Blood Glucose.: 90 mg/dL (28 Oct 2023 06:17)  POCT Blood Glucose.: 127 mg/dL (27 Oct 2023 23:49)    I&O's Summary    27 Oct 2023 07:01  -  28 Oct 2023 07:00  --------------------------------------------------------  IN: 200 mL / OUT: 400 mL / NET: -200 mL    28 Oct 2023 07:01  -  28 Oct 2023 19:00  --------------------------------------------------------  IN: 710 mL / OUT: 0 mL / NET: 710 mL        PHYSICAL EXAM:  Vital Signs Last 24 Hrs  T(C): 37.1 (28 Oct 2023 18:00), Max: 37.1 (28 Oct 2023 01:44)  T(F): 98.7 (28 Oct 2023 18:00), Max: 98.7 (28 Oct 2023 01:44)  HR: 71 (28 Oct 2023 18:55) (63 - 90)  BP: 126/56 (28 Oct 2023 18:00) (109/52 - 161/72)  BP(mean): 74 (28 Oct 2023 00:00) (70 - 74)  RR: 18 (28 Oct 2023 18:00) (12 - 20)  SpO2: 100% (28 Oct 2023 18:55) (95% - 100%)    Parameters below as of 28 Oct 2023 18:55  Patient On (Oxygen Delivery Method): ventilator        GENERAL: NAD, well-developed  HEAD:  Atraumatic, Normocephalic  EYES: EOMI, PERRLA, conjunctiva and sclera clear  NECK: Supple, No JVD. Trach in place, connected to mech vent  CHEST/LUNG: Clear to auscultation bilaterally; No wheeze  HEART: Regular rate and rhythm; No murmurs, rubs, or gallops  ABDOMEN: Soft, tender, Nondistended; Bowel sounds present PEG in place  EXTREMITIES:  2+ Peripheral Pulses, No clubbing, cyanosis, or edema  PSYCH: AAOx3  NEUROLOGY: non-focal  SKIN: No rashes or lesions    LABS:                        8.5    6.07  )-----------( 117      ( 28 Oct 2023 07:19 )             28.4     10-28    135  |  100  |  16  ----------------------------<  77  3.3<L>   |  27  |  <0.30<L>    Ca    8.3<L>      28 Oct 2023 07:19  Phos  3.2     10-28  Mg     1.9     10-28    TPro  6.7  /  Alb  3.0<L>  /  TBili  0.2  /  DBili  x   /  AST  21  /  ALT  23  /  AlkPhos  47  10-28    PT/INR - ( 27 Oct 2023 14:39 )   PT: 12.3 sec;   INR: 1.12 ratio         PTT - ( 27 Oct 2023 14:39 )  PTT:30.8 sec      Urinalysis Basic - ( 28 Oct 2023 07:19 )    Color: x / Appearance: x / SG: x / pH: x  Gluc: 77 mg/dL / Ketone: x  / Bili: x / Urobili: x   Blood: x / Protein: x / Nitrite: x   Leuk Esterase: x / RBC: x / WBC x   Sq Epi: x / Non Sq Epi: x / Bacteria: x        RADIOLOGY & ADDITIONAL TESTS:    Imaging Personally Reviewed:    Consultant(s) Notes Reviewed:      Care Discussed with Consultants/Other Providers:

## 2023-10-28 NOTE — PROGRESS NOTE ADULT - PROBLEM SELECTOR PLAN 8
Continue Home Lexapro 10mg daily   Continue Seroquel 12.5mg hs   Family requesting Psych consult during admission

## 2023-10-28 NOTE — H&P ADULT - NSVTERISKASSESS_GEN_ALL_CORE FT
Medical Assessment Completed on: 27-Oct-2023 21:23
Medical Assessment Completed on: 28-Oct-2023 01:03

## 2023-10-28 NOTE — H&P ADULT - ATTENDING COMMENTS
Patient seen and examined.  Agree with resident note as above.  Patient with hx as noted including chronic respiratory failure on vent, PEG, decub who presents to ER with oozing around trach and PEG site, subacute abdominal distention, delirium.  In the ER patient found to have relative leukocytosis, family reporting continued delirium.  +large decub on exam.  Small amount of blood at PEG site.      Will admit to RCU for continued respiratory support and eval for sepsis due to decub infection vs LLL PNA vs other.  Family also requests ENT and GI eval, as well as eval on abdominal distention.  PLan as above and I have edited as appropriate.  Discussed with son at bedside.  FULL CODE.

## 2023-10-28 NOTE — H&P ADULT - NSICDXPASTSURGICALHX_GEN_ALL_CORE_FT
PAST SURGICAL HISTORY:  H/O tracheostomy     PEG (percutaneous endoscopic gastrostomy) status     
PAST SURGICAL HISTORY:  H/O tracheostomy     PEG (percutaneous endoscopic gastrostomy) status

## 2023-10-28 NOTE — PATIENT PROFILE ADULT - FALL HARM RISK - HARM RISK INTERVENTIONS

## 2023-10-28 NOTE — H&P ADULT - ASSESSMENT
74 y/o F with PMHx of T2DM, HTN, HLD, anemia, hypothyroidism, RA, fibromyalgia, remote cerebral aneurysm repair, acute hypercapnic respiratory failure now with tracheostomy, PEG tube, and bedbound (trach change 8/18, PEG change 8/14), sacral pressure ulcer, BIBEMS from home for 6 day hx of bleeding from the tracheostomy and PEG tube with associated abdominal pain, recent history of auditory and visual hallucinations, and with chronic sacral decubitus ulcer. Exam notable for mild abdominal distention with LLQ and RLQ tenderness, tracheostomy in place with no obvious bleeding, PEG tube with scant amount of dried blood, at baseline neurologic status. Imaging showing no active bleeding around tracheostomy site, however was notable for mild thickening along PEG tube tract, sacral ulcer extending to the coccyx suggestive of possible osteomyelitis, and bibasilar patchy lung opacities with volume loss. Patient admitted for respiratory support, wound care of tracheostomy and PEG, and management of presumed sacral osteomyelitis.    Neuro  -Patient A&Ox2 which is consistent with baseline  -previous reports of auditory and visual hallucinations per family and visiting nurse  -No active hallucinations on exam  -differential for hallucinations is broad, may be secondary to polypharmacy, dementia, etc; Will continue to monitor  -continue home Lexapro and Seroquel    CV  -vitals stable  -EKG with normal sinus rhythm; prior EKG from 2020 meeting voltage criteria for LVH   otherwise no significant abnormality  -no known history of CAD though patient does have risk factors  -echo from 10/17/2020 notable for hyperdynamic L ventricular systolic function, moderately severe LVOT obstruction, and EF 81% (per Wilkinson calculation) vs 77% (per Teicholtz calculation)  -will hold home amlodipine    Pulm  -ct findings-pneumonia vs atelectasis  -no respiratory distress  -will continue aztreonem and vancomycin  -will continue home regimen- duonebs, mucomist 10%, saline nebs 3%, cough assist, and manual chest PT (all q6h)  -currently stable on vent  -no acute signs of bleeding noted around trach  -ENT eval for bleeding trach? as per family request  -will order sputum cultures    GI  -last BM 1	0/27  -on tube feeds at home, will continue home regimen  -bowel regimen miralax and senna qhs  -GI eval for bleeding PEG tube?    Renal  -no evidence of UTI on UA  -follow urine cultures  -renal function WNL  -trend BMP    Heme  -noted to be bleeding around PEG tube  -not on anticoagulation, coags WNL  -monitor for increased signs of bleeding  -hx of anemia, Hgb stable  -trend H&H    ID  -Not meeting SIRS criteria  -Aztreonam and vancomycin for broad coverage, source being pneumonia vs ulcer/osteomyelitis, will follow cultures     Endo  *T2DM  -Home regimen of Levemir 14 units in morning  -Continue home regimen with sliding scale    MSK  *RA  -Continue home Prednisone regimen  *fibromyalgia  -patient apparently taking Tylenol daily at home for pain; will hold  -continue home Gabapentin 100mg daily  -continue home Fentanyl patches    DVT prophylaxis: intermittent pneumatic compression    Ethics  - Full code

## 2023-10-28 NOTE — PROGRESS NOTE ADULT - PROBLEM SELECTOR PLAN 1
Found w/ Bilateral PNA vs Atelectasis, and Possible OM Sacrum   S/p Fevers at home, Afebrile since admission   - S/p Chest CT (10/28):  c/w Bilateral PNA vs Atelectasis   - Started Empirically on Vanco, Aztreonam   - Blood cx: NGTD   - urine culture: negative   - Rvp: negative; Sputum cx pending    - Urine Legionella: pending   - Airway Clearance: Duoneb, Hypersal, Chest PT, PRN suctioning   - Maintain 02 sat > 92%

## 2023-10-28 NOTE — PROGRESS NOTE ADULT - PROBLEM SELECTOR PLAN 3
Chronic Trach/ Vent dependant 2/2 Hypercapnic Resp Failure since 10/2022   -S/p Trach # 8 Cuffed Flex Shiley; trach change 8/18, PEG change 8/14 per records   -Continue Home vent settings: (300 TV/ RR 12/5 Peep/ Fio2 30%)  -Tolerates intermittent PSV during day/ Vent HS  - Airway Clearance: Duoneb, Hypersal, Chest PT, PRN suctioning   - Maintain 02 sat > 92%    Tracheal Bleeding (Intermittent):   -S/p CT Angio neck: No evidence of active bleeding around tracheostomy site. No hematoma.  No collection   - Advise RN staff to use Red rubber trach catheters to avoid suction trauma   - Will consider ENT evaluation for scope If persist

## 2023-10-28 NOTE — H&P ADULT - NSHPLABSRESULTS_GEN_ALL_CORE
< from: CT Abdomen and Pelvis w/ IV Cont (10.27.23 @ 17:23) >      ACC: [id number] EXAM:  CT ABDOMEN AND PELVIS IC   ORDERED BY: MAGDALENO THAPA     PROCEDURE DATE:  10/27/2023          INTERPRETATION:  CLINICAL INFORMATION: Abdominal pain. Bleeding   gastrostomy tube site.    COMPARISON: CT chest 10/7/2020..    CONTRAST/COMPLICATIONS:  IV Contrast: IV contrast documented in unlinked concurrent exam  90 cc of   Omnipaque 300 was administered, 10 cc was discarded.  Oral Contrast: NONE  Complications: None reported at time of study completion    PROCEDURE:  CT of theAbdomen and Pelvis was performed.  Sagittal and coronal reformats were performed.    FINDINGS:    LOWER CHEST: Bibasilar patchy lower thorax/lingular consolidative   opacities with volume loss, concerning for atelectasis or infection.   Follow to resolution. Aortic valve calcification. Multivessel coronary   artery calcifications.    LIVER: Liver size within normal limits. Portal vein and hepatic veins are   patent. Embolic material at the kasey hepatis.  BILE DUCTS: No distention.  GALLBLADDER: Unremarkable CT appearance.  SPLEEN: Spleen size within normal limits  PANCREAS: No acute peripancreatic inflammation  ADRENALS: Unremarkable  KIDNEYS/URETERS: No hydronephrosis. Contrast opacifies bilateral renal   collecting systems    BLADDER: Mildly distended  REPRODUCTIVE ORGANS: Right adnexal 2 cm cystic lesion, image 110 series   2. Uterus and adnexa within normal limits.    BOWEL: Stomach is underdistended with postoperative changes along the   lesser curvature. Gastrostomy tube localized to the stomach with mild   thickening along the tract. Punctate hyperdense focus along the   gastrostomy tube tract, image 48 series 12, possibly prior postoperative   change versus focus of bleeding or hyperemia. Lack of a precontrast phase   limits evaluation. No small bowel distention. Mild stool burden of the   colon and overall underdistention limits evaluation of the colonic   mucosa. Appendix not visualized. No secondary signs of acute appendicitis.  PERITONEUM: No ascites.  VESSELS: No abdominal aortic aneurysm. Atheromatous changes.  RETROPERITONEUM/LYMPH NODES: Small volume nodes not enlarged by CT size   criteria.  ABDOMINAL WALL: Gastrostomy tube as described above. No abdominal wall   hematoma. Diffuse atrophy of the abdominal wall and proximal thigh   muscles. Tiny fat-containing umbilical hernia.  Sacral decubitus ulcer   extending to the coccyx with osseous remodeling, image 124 series 2.   Recommend pelvic MRI or bone scan to exclude osteomyelitis.  BONES: Osseous demineralization. Age-indeterminate mild height loss of   T12, L2, and L3 vertebral bodies. Old left pubic ramus fractures.   Degenerative changes.    IMPRESSION:    Gastrostomy tube localized to the stomach with mild thickening along the   tract. Punctate hyperdense focus along the gastrostomy tube tract, image   48 series 12, possibly prior postoperative change versus focus of   bleeding or hyperemia. Lack of a precontrast phase limits evaluation.    Sacral decubitus ulcer extending to the coccyx with osseous remodeling,   image 124 series 2. Recommend pelvic MRI or bone scan to exclude   osteomyelitis.    Bibasilar patchy lower thorax/lingular consolidative opacities with   volume loss, concerning for atelectasis or infection. Follow to   resolution.    Age-indeterminate mild height loss of T12, L2, and L3 vertebral bodies.   Old left pubic ramus fractures.    --- End of Report ---           SOTO ROSE MD; Resident Radiologist  This document has been electronically signed.  KERRIE MARTÍNEZ M.D., ATTENDING RADIOLOGIST  This document has been electronically signed. Oct 27 2023  6:34PM    < end of copied text >
9.8    10.89 )-----------( 136      ( 27 Oct 2023 14:39 )             32.5   10-27    134<L>  |  95<L>  |  32<H>  ----------------------------<  281<H>  4.7   |  31  |  <0.30<L>    Ca    9.4      27 Oct 2023 14:39    TPro  7.7  /  Alb  3.3  /  TBili  0.4  /  DBili  x   /  AST  38  /  ALT  32  /  AlkPhos  59  10-27  PT/INR - ( 27 Oct 2023 14:39 )   PT: 12.3 sec;   INR: 1.12 ratio         PTT - ( 27 Oct 2023 14:39 )  PTT:30.8 sec    RADIOLOGY:  Personally reviewed.    < from: CT Angio Neck w/ IV Cont (10.27.23 @ 17:14) >    IMPRESSION:    Patent cervical vasculature. No flow limiting stenosis or occlusion.    No evidence of active bleeding around tracheostomy site. No hematoma.   Tracheostomy without evidence of discrete drainable collection around the   tracheostomy site.    < end of copied text >    < from: CT Abdomen and Pelvis w/ IV Cont (10.27.23 @ 17:23) >    IMPRESSION:    Gastrostomy tube localized to the stomach with mild thickening along the   tract. Punctate hyperdense focus along the gastrostomy tube tract, image   48 series 12, possibly prior postoperative change versus focus of   bleeding or hyperemia. Lack of a precontrast phase limits evaluation.    Sacral decubitus ulcer extending to the coccyx with osseous remodeling,   image 124 series 2. Recommend pelvic MRI or bone scan to exclude   osteomyelitis.    Bibasilar patchy lower thorax/lingular consolidative opacities with   volume loss, concerning for atelectasis or infection. Follow to   resolution.    Age-indeterminate mild height loss of T12, L2, and L3 vertebral bodies.   Old left pubic ramus fractures.    < end of copied text >

## 2023-10-28 NOTE — PROGRESS NOTE ADULT - PROBLEM SELECTOR PLAN 2
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   - S/p CT abd/pelvis (10/28): Sacral decubitus ulcer extending to the coccyx with osseous remodeling and pelvic MRI or bone scan to recc to exclude osteomyelitis.  - MRI pelvis 10/28: ordered   - Elevated, ESR 85   - Elevated,    - Maintain current empiric Abx   - Wound care evaluation pending   - ID consult placed

## 2023-10-28 NOTE — H&P ADULT - NSHPPHYSICALEXAM_GEN_ALL_CORE
LOS: 1d    VITALS:   T(C): 36.8 (10-28-23 @ 02:25), Max: 37.1 (10-27-23 @ 14:15)  HR: 90 (10-28-23 @ 02:34) (64 - 90)  BP: 161/72 (10-28-23 @ 02:25) (109/52 - 161/72)  RR: 15 (10-28-23 @ 02:25) (12 - 20)  SpO2: 100% (10-28-23 @ 02:34) (95% - 100%)    GENERAL: NAD, appears to be breathing comfortably on ventilator  HEAD:  Atraumatic, Normocephalic  EYES: EOMI, PERRLA, conjunctiva and sclera clear  ENT: Moist mucous membranes  NECK: Supple, No JVD; tracheostomy in place  CHEST/LUNG: Clear to auscultation bilaterally; No rales, rhonchi, wheezing, or rubs. Unlabored respirations  HEART: Regular rate and rhythm; No murmurs, rubs, or gallops  ABDOMEN: mild distention; tenderness to RLQ and LLQ, more significant to LLQ; normal bowel sounds; PEG tube in place with scant dried blood noted at insertion site  EXTREMITIES:  no edema noted  NERVOUS SYSTEM:  A&Ox2 (at baseline); able to follow commands; strength 0/5 in all extremities  SKIN: sacral decubitus ulcer noted

## 2023-10-28 NOTE — H&P ADULT - NSICDXPASTMEDICALHX_GEN_ALL_CORE_FT
PAST MEDICAL HISTORY:  Diabetes     Fibromyalgia     Hypertension     Hypothyroid     Rheumatoid arthritis     
PAST MEDICAL HISTORY:  Diabetes     Fibromyalgia     Hypothyroid     Rheumatoid arthritis

## 2023-10-28 NOTE — PROGRESS NOTE ADULT - PROBLEM SELECTOR PLAN 4
-S/p CT Abd/Pelvis: No emergent findings or active bleed; 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; 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   -Bowel regimen  -Continue Simthicone QID

## 2023-10-29 LAB
ALBUMIN SERPL ELPH-MCNC: 3.2 G/DL — LOW (ref 3.3–5)
ALBUMIN SERPL ELPH-MCNC: 3.2 G/DL — LOW (ref 3.3–5)
ALP SERPL-CCNC: 62 U/L — SIGNIFICANT CHANGE UP (ref 40–120)
ALP SERPL-CCNC: 62 U/L — SIGNIFICANT CHANGE UP (ref 40–120)
ALT FLD-CCNC: 36 U/L — SIGNIFICANT CHANGE UP (ref 10–45)
ALT FLD-CCNC: 36 U/L — SIGNIFICANT CHANGE UP (ref 10–45)
ANION GAP SERPL CALC-SCNC: 9 MMOL/L — SIGNIFICANT CHANGE UP (ref 5–17)
ANION GAP SERPL CALC-SCNC: 9 MMOL/L — SIGNIFICANT CHANGE UP (ref 5–17)
APTT BLD: 32.5 SEC — SIGNIFICANT CHANGE UP (ref 24.5–35.6)
APTT BLD: 32.5 SEC — SIGNIFICANT CHANGE UP (ref 24.5–35.6)
AST SERPL-CCNC: 30 U/L — SIGNIFICANT CHANGE UP (ref 10–40)
AST SERPL-CCNC: 30 U/L — SIGNIFICANT CHANGE UP (ref 10–40)
BILIRUB SERPL-MCNC: 0.2 MG/DL — SIGNIFICANT CHANGE UP (ref 0.2–1.2)
BILIRUB SERPL-MCNC: 0.2 MG/DL — SIGNIFICANT CHANGE UP (ref 0.2–1.2)
BUN SERPL-MCNC: 20 MG/DL — SIGNIFICANT CHANGE UP (ref 7–23)
BUN SERPL-MCNC: 20 MG/DL — SIGNIFICANT CHANGE UP (ref 7–23)
CALCIUM SERPL-MCNC: 9.3 MG/DL — SIGNIFICANT CHANGE UP (ref 8.4–10.5)
CALCIUM SERPL-MCNC: 9.3 MG/DL — SIGNIFICANT CHANGE UP (ref 8.4–10.5)
CHLORIDE SERPL-SCNC: 99 MMOL/L — SIGNIFICANT CHANGE UP (ref 96–108)
CHLORIDE SERPL-SCNC: 99 MMOL/L — SIGNIFICANT CHANGE UP (ref 96–108)
CO2 SERPL-SCNC: 27 MMOL/L — SIGNIFICANT CHANGE UP (ref 22–31)
CO2 SERPL-SCNC: 27 MMOL/L — SIGNIFICANT CHANGE UP (ref 22–31)
CREAT SERPL-MCNC: <0.3 MG/DL — LOW (ref 0.5–1.3)
CREAT SERPL-MCNC: <0.3 MG/DL — LOW (ref 0.5–1.3)
EGFR: 113 ML/MIN/1.73M2 — SIGNIFICANT CHANGE UP
EGFR: 113 ML/MIN/1.73M2 — SIGNIFICANT CHANGE UP
GLUCOSE BLDC GLUCOMTR-MCNC: 213 MG/DL — HIGH (ref 70–99)
GLUCOSE BLDC GLUCOMTR-MCNC: 213 MG/DL — HIGH (ref 70–99)
GLUCOSE BLDC GLUCOMTR-MCNC: 222 MG/DL — HIGH (ref 70–99)
GLUCOSE BLDC GLUCOMTR-MCNC: 222 MG/DL — HIGH (ref 70–99)
GLUCOSE BLDC GLUCOMTR-MCNC: 239 MG/DL — HIGH (ref 70–99)
GLUCOSE BLDC GLUCOMTR-MCNC: 239 MG/DL — HIGH (ref 70–99)
GLUCOSE BLDC GLUCOMTR-MCNC: 305 MG/DL — HIGH (ref 70–99)
GLUCOSE BLDC GLUCOMTR-MCNC: 305 MG/DL — HIGH (ref 70–99)
GLUCOSE SERPL-MCNC: 263 MG/DL — HIGH (ref 70–99)
GLUCOSE SERPL-MCNC: 263 MG/DL — HIGH (ref 70–99)
HCT VFR BLD CALC: 33 % — LOW (ref 34.5–45)
HCT VFR BLD CALC: 33 % — LOW (ref 34.5–45)
HGB BLD-MCNC: 10 G/DL — LOW (ref 11.5–15.5)
HGB BLD-MCNC: 10 G/DL — LOW (ref 11.5–15.5)
MAGNESIUM SERPL-MCNC: 1.9 MG/DL — SIGNIFICANT CHANGE UP (ref 1.6–2.6)
MAGNESIUM SERPL-MCNC: 1.9 MG/DL — SIGNIFICANT CHANGE UP (ref 1.6–2.6)
MCHC RBC-ENTMCNC: 27.9 PG — SIGNIFICANT CHANGE UP (ref 27–34)
MCHC RBC-ENTMCNC: 27.9 PG — SIGNIFICANT CHANGE UP (ref 27–34)
MCHC RBC-ENTMCNC: 30.3 GM/DL — LOW (ref 32–36)
MCHC RBC-ENTMCNC: 30.3 GM/DL — LOW (ref 32–36)
MCV RBC AUTO: 92.2 FL — SIGNIFICANT CHANGE UP (ref 80–100)
MCV RBC AUTO: 92.2 FL — SIGNIFICANT CHANGE UP (ref 80–100)
NRBC # BLD: 0 /100 WBCS — SIGNIFICANT CHANGE UP (ref 0–0)
NRBC # BLD: 0 /100 WBCS — SIGNIFICANT CHANGE UP (ref 0–0)
PHOSPHATE SERPL-MCNC: 3.4 MG/DL — SIGNIFICANT CHANGE UP (ref 2.5–4.5)
PHOSPHATE SERPL-MCNC: 3.4 MG/DL — SIGNIFICANT CHANGE UP (ref 2.5–4.5)
PLATELET # BLD AUTO: 138 K/UL — LOW (ref 150–400)
PLATELET # BLD AUTO: 138 K/UL — LOW (ref 150–400)
POTASSIUM SERPL-MCNC: 4.2 MMOL/L — SIGNIFICANT CHANGE UP (ref 3.5–5.3)
POTASSIUM SERPL-MCNC: 4.2 MMOL/L — SIGNIFICANT CHANGE UP (ref 3.5–5.3)
POTASSIUM SERPL-SCNC: 4.2 MMOL/L — SIGNIFICANT CHANGE UP (ref 3.5–5.3)
POTASSIUM SERPL-SCNC: 4.2 MMOL/L — SIGNIFICANT CHANGE UP (ref 3.5–5.3)
PROT SERPL-MCNC: 7.3 G/DL — SIGNIFICANT CHANGE UP (ref 6–8.3)
PROT SERPL-MCNC: 7.3 G/DL — SIGNIFICANT CHANGE UP (ref 6–8.3)
RBC # BLD: 3.58 M/UL — LOW (ref 3.8–5.2)
RBC # BLD: 3.58 M/UL — LOW (ref 3.8–5.2)
RBC # FLD: 18 % — HIGH (ref 10.3–14.5)
RBC # FLD: 18 % — HIGH (ref 10.3–14.5)
SODIUM SERPL-SCNC: 135 MMOL/L — SIGNIFICANT CHANGE UP (ref 135–145)
SODIUM SERPL-SCNC: 135 MMOL/L — SIGNIFICANT CHANGE UP (ref 135–145)
VANCOMYCIN TROUGH SERPL-MCNC: 15.2 UG/ML — SIGNIFICANT CHANGE UP (ref 10–20)
VANCOMYCIN TROUGH SERPL-MCNC: 15.2 UG/ML — SIGNIFICANT CHANGE UP (ref 10–20)
WBC # BLD: 6.98 K/UL — SIGNIFICANT CHANGE UP (ref 3.8–10.5)
WBC # BLD: 6.98 K/UL — SIGNIFICANT CHANGE UP (ref 3.8–10.5)
WBC # FLD AUTO: 6.98 K/UL — SIGNIFICANT CHANGE UP (ref 3.8–10.5)
WBC # FLD AUTO: 6.98 K/UL — SIGNIFICANT CHANGE UP (ref 3.8–10.5)

## 2023-10-29 PROCEDURE — 99222 1ST HOSP IP/OBS MODERATE 55: CPT

## 2023-10-29 PROCEDURE — 99233 SBSQ HOSP IP/OBS HIGH 50: CPT | Mod: FS

## 2023-10-29 RX ADMIN — Medication 250 MILLIGRAM(S): at 22:31

## 2023-10-29 RX ADMIN — SODIUM CHLORIDE 4 MILLILITER(S): 9 INJECTION INTRAMUSCULAR; INTRAVENOUS; SUBCUTANEOUS at 23:18

## 2023-10-29 RX ADMIN — Medication 2: at 11:34

## 2023-10-29 RX ADMIN — GABAPENTIN 100 MILLIGRAM(S): 400 CAPSULE ORAL at 21:13

## 2023-10-29 RX ADMIN — SIMETHICONE 80 MILLIGRAM(S): 80 TABLET, CHEWABLE ORAL at 00:33

## 2023-10-29 RX ADMIN — Medication 50 MILLIGRAM(S): at 05:39

## 2023-10-29 RX ADMIN — Medication 50 MILLIGRAM(S): at 15:23

## 2023-10-29 RX ADMIN — Medication 2: at 18:31

## 2023-10-29 RX ADMIN — Medication 100 MICROGRAM(S): at 05:39

## 2023-10-29 RX ADMIN — SIMETHICONE 80 MILLIGRAM(S): 80 TABLET, CHEWABLE ORAL at 05:39

## 2023-10-29 RX ADMIN — Medication 1 TABLET(S): at 18:53

## 2023-10-29 RX ADMIN — CHLORHEXIDINE GLUCONATE 15 MILLILITER(S): 213 SOLUTION TOPICAL at 18:53

## 2023-10-29 RX ADMIN — PANTOPRAZOLE SODIUM 40 MILLIGRAM(S): 20 TABLET, DELAYED RELEASE ORAL at 11:33

## 2023-10-29 RX ADMIN — Medication 2 DROP(S): at 11:33

## 2023-10-29 RX ADMIN — ESCITALOPRAM OXALATE 10 MILLIGRAM(S): 10 TABLET, FILM COATED ORAL at 11:33

## 2023-10-29 RX ADMIN — Medication 2 DROP(S): at 00:33

## 2023-10-29 RX ADMIN — Medication 2 DROP(S): at 18:53

## 2023-10-29 RX ADMIN — ZINC SULFATE TAB 220 MG (50 MG ZINC EQUIVALENT) 220 MILLIGRAM(S): 220 (50 ZN) TAB at 11:37

## 2023-10-29 RX ADMIN — SODIUM CHLORIDE 4 MILLILITER(S): 9 INJECTION INTRAMUSCULAR; INTRAVENOUS; SUBCUTANEOUS at 06:17

## 2023-10-29 RX ADMIN — Medication 3 MILLILITER(S): at 17:24

## 2023-10-29 RX ADMIN — Medication 15 MILLILITER(S): at 11:33

## 2023-10-29 RX ADMIN — Medication 3 MILLILITER(S): at 06:17

## 2023-10-29 RX ADMIN — Medication 3 MILLILITER(S): at 11:27

## 2023-10-29 RX ADMIN — Medication 1 TABLET(S): at 05:39

## 2023-10-29 RX ADMIN — SIMETHICONE 80 MILLIGRAM(S): 80 TABLET, CHEWABLE ORAL at 18:53

## 2023-10-29 RX ADMIN — Medication 4: at 00:32

## 2023-10-29 RX ADMIN — QUETIAPINE FUMARATE 12.5 MILLIGRAM(S): 200 TABLET, FILM COATED ORAL at 21:13

## 2023-10-29 RX ADMIN — SODIUM CHLORIDE 4 MILLILITER(S): 9 INJECTION INTRAMUSCULAR; INTRAVENOUS; SUBCUTANEOUS at 11:27

## 2023-10-29 RX ADMIN — Medication 2: at 06:35

## 2023-10-29 RX ADMIN — CHLORHEXIDINE GLUCONATE 1 APPLICATION(S): 213 SOLUTION TOPICAL at 05:40

## 2023-10-29 RX ADMIN — Medication 50 MILLIGRAM(S): at 21:12

## 2023-10-29 RX ADMIN — Medication 250 MILLIGRAM(S): at 11:32

## 2023-10-29 RX ADMIN — SODIUM CHLORIDE 4 MILLILITER(S): 9 INJECTION INTRAMUSCULAR; INTRAVENOUS; SUBCUTANEOUS at 17:24

## 2023-10-29 RX ADMIN — Medication 5 MILLIGRAM(S): at 05:39

## 2023-10-29 RX ADMIN — CHLORHEXIDINE GLUCONATE 15 MILLILITER(S): 213 SOLUTION TOPICAL at 05:40

## 2023-10-29 RX ADMIN — Medication 650 MILLIGRAM(S): at 00:30

## 2023-10-29 RX ADMIN — SIMETHICONE 80 MILLIGRAM(S): 80 TABLET, CHEWABLE ORAL at 11:33

## 2023-10-29 RX ADMIN — Medication 3 MILLILITER(S): at 23:17

## 2023-10-29 RX ADMIN — Medication 2 DROP(S): at 05:40

## 2023-10-29 NOTE — DIETITIAN INITIAL EVALUATION ADULT - ORAL INTAKE PTA/DIET HISTORY
visited pt at bedside this afternoon. per pt , providing pt with [employer] Peptide 1.5, 3 325ml bottles daily (total 975ml daily). This is providing pt with 1500kcal and 72g protein daily. Pt uses [employer] because it was recommended by a family friend who is a dietitian.  confirms pt with PMHx of DM2. per outpatient medications, has a glucometer and metformin, glipizide noted.   Pecan, hazelnut, pineapple, walnut allergies noted per electronic medical record

## 2023-10-29 NOTE — DIETITIAN INITIAL EVALUATION ADULT - ADD RECOMMEND
1) Will continue to monitor weight, labs, skin, GI status and diet 2) Continue ayush to aid in wound healing 3) continue zinc to aid in wound healing. d/c after 7 days 4) continue Multivitamin to aid in wound healing. 4) defer free water flush to team.

## 2023-10-29 NOTE — DIETITIAN INITIAL EVALUATION ADULT - ENTERAL
Change diet to pt home regimen: [email or web address] Peptide 1.5, 975ml total volume. This will provide pt with 1500kcal (27kcal/kg), 72g protein (1.3g/kg) and 684ml free water.  Change diet to pt home regimen: [employer] Peptide 1.5, 990ml total volume (55ml/hr x 18 hours). This will provide pt with 1523kcal (28kcal/kg), 73g protein (1.3g/kg) and 684ml free water.

## 2023-10-29 NOTE — PHYSICAL THERAPY INITIAL EVALUATION ADULT - RANGE OF MOTION EXAMINATION, REHAB EVAL
UE limited at shoulder elbow and hand to approx 50% AROM, PROM limited to 90 degrees shoulder flexion with pain/bilateral lower extremity ROM was WFL (within functional limits)

## 2023-10-29 NOTE — PHYSICAL THERAPY INITIAL EVALUATION ADULT - ADDITIONAL COMMENTS
Pt resides at her daughters house with her  and daughter, a ranch style house, with 5 steps to enter with single hr and 8 steps to main level with bilateral handrails. Pt was at home with Home PT, Home RN, Home Health Aide, and daughter providing 24/7 care.  pt was predominately bed bound and dependent on home care and daughter for all care.  Pt has a hospital bed, air mattress, wheel chair, mechanical lift, mechanical vent and PEG supplies/machines.  per daughter, the patients ROHO cushion for wheelchair is pending insurance authorization.

## 2023-10-29 NOTE — CONSULT NOTE ADULT - ASSESSMENT
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 Bleeding from gastrostomy. Site appears macerated. Multifactorial, possible the PEG has been too tight at home.  -PEG loosened at bedside  -wound care consult    2. Leakage at PEG site. Pt is on rate of 60, home rate is 50  would lower to 40 for now and observe for leakage.    3. Abdominal pain. CT negative for acute pathology.  recc miralax BID    4. Respiratory per RCU        Advanced care planning forms were discussed. Code status including forceful chest compressions, defibrillation and intubation were discussed. The risks benefits and alternatives to pertinent gastrointestinal procedures and interventions were discussed in detail and all questions were answered. Duration: 15 Minutes.    [facility]  Andrew Morgan M.D.   [address], [place]  Office: [phone]

## 2023-10-29 NOTE — DIETITIAN INITIAL EVALUATION ADULT - PERTINENT LABORATORY DATA
10-29    135  |  99  |  20  ----------------------------<  263<H>  4.2   |  27  |  <0.30<L>    Ca    9.3      29 Oct 2023 11:51  Phos  3.4     10-29  Mg     1.9     10-29    TPro  7.3  /  Alb  3.2<L>  /  TBili  0.2  /  DBili  x   /  AST  30  /  ALT  36  /  AlkPhos  62  10-29  POCT Blood Glucose.: 239 mg/dL (10-29-23 @ 11:07)  A1C with Estimated Average Glucose Result: 7.5 % (10-28-23 @ 07:18)

## 2023-10-29 NOTE — DIETITIAN INITIAL EVALUATION ADULT - NSICDXPASTMEDICALHX_GEN_ALL_CORE_FT
PAST MEDICAL HISTORY:  Diabetes     Fibromyalgia     Hypertension     Hypothyroid     Rheumatoid arthritis

## 2023-10-29 NOTE — PHYSICAL THERAPY INITIAL EVALUATION ADULT - PATIENT/FAMILY AGREES WITH PLAN
possibly amenable to [facility], likely prefer home with 24hr supervision / nursing care / Home PT / Home OT/yes

## 2023-10-29 NOTE — PHYSICAL THERAPY INITIAL EVALUATION ADULT - NSPTDISCHREC_GEN_A_CORE
Sub-acute Rehab if home, pt would require home PT home OT and home RN, as well as HHA and 24hr assistance./Sub-acute Rehab

## 2023-10-29 NOTE — CONSULT NOTE ADULT - ASSESSMENT
73 F PMH T2DM (A1c 7.5), HTN, HLD, anemia, hypothyroidism, RA, fibromyalgia, remote cerebral aneurysm repair, respiratory failure s/p Trach, s/p PEG, bedbound (trach change 8/18, PEG change 8/14), sacral wound, brought in for bleeding from trach and PEG site.     Chronic sacral decubitus  Outpatient blood and urine cx taken prior to admission are negative for 48h as per daughter  Here pt afebrile, FiO2 30% Trach to Vent, WBC 10  RVP negative, blood and urine cx negative to date, sputum cx pending  CXR with bibasilar patchy opacities  CT A/P showed sacral decub with osseous remodelling, bibasilar patchy opacities (atelectasis vs infection), and G tube with mild tract thickening.    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    Sixto Genao MD, PGY-5   ID Fellow  [employer] Teams Preferred  After 5pm/weekends call [phone]

## 2023-10-29 NOTE — PROGRESS NOTE ADULT - SUBJECTIVE AND OBJECTIVE BOX
Patient is a 73y old  Female who presents with a chief complaint of     HPI:  10/29/2023    Patient is at baseline mental state. On [facility] vent. Afebrile, no distress noted.    PAST MEDICAL & SURGICAL HISTORY:  Diabetes      Rheumatoid arthritis      Fibromyalgia      Hypothyroid      Hypertension      H/O tracheostomy      PEG (percutaneous endoscopic gastrostomy) status          Review of Systems:   CONSTITUTIONAL: No fever, weight loss, or fatigue  EYES: No eye pain, visual disturbances, or discharge  ENMT:  No difficulty hearing, tinnitus, vertigo; No sinus or throat pain  NECK: No pain or stiffness  BREASTS: No pain, masses, or nipple discharge  RESPIRATORY: No cough, wheezing, chills or hemoptysis; No shortness of breath  CARDIOVASCULAR: No chest pain, palpitations, dizziness, or leg swelling  GASTROINTESTINAL: No abdominal or epigastric pain. No nausea, vomiting, or hematemesis; No diarrhea or constipation. No melena or hematochezia.  GENITOURINARY: No dysuria, frequency, hematuria, or incontinence  NEUROLOGICAL: Bed bound, functionally quadriplegic  SKIN: No itching, burning, rashes, or lesions   LYMPH NODES: No enlarged glands  ENDOCRINE: No heat or cold intolerance; No hair loss  MUSCULOSKELETAL: No joint pain or swelling; No muscle, back, or extremity pain  PSYCHIATRIC: No depression, anxiety, mood swings, or difficulty sleeping  HEME/LYMPH: No easy bruising, or bleeding gums  ALLERY AND IMMUNOLOGIC: No hives or eczema    Allergies    penicillin (Unknown)  heparin (Unknown)  Tagamet (Unknown)  pineapple (Unknown)  walnut (Unknown)  Pecan, Filbert, Hazelnut (Unknown)  Lyrica (Unknown)    Intolerances        Social History:     FAMILY HISTORY:      MEDICATIONS  (STANDING):  acetaminophen   IVPB .. 1000 milliGRAM(s) IV Intermittent once  acetylcysteine 10%  Inhalation 4 milliLiter(s) Inhalation once  albuterol/ipratropium for Nebulization 3 milliLiter(s) Nebulizer every 6 hours  artificial  tears Solution 2 Drop(s) Both EYES every 6 hours  aztreonam  IVPB      aztreonam  IVPB 1000 milliGRAM(s) IV Intermittent every 8 hours  calcium carbonate    500 mG (Tums) Chewable 1 Tablet(s) Chew every 12 hours  chlorhexidine 0.12% Liquid 15 milliLiter(s) Oral Mucosa every 12 hours  chlorhexidine 4% Liquid 1 Application(s) Topical <User Schedule>  dextrose 50% Injectable 50 milliLiter(s) IV Push once  escitalopram 10 milliGRAM(s) Oral daily  fentaNYL   Patch  25 MICROgram(s)/Hr 1 Patch Transdermal every 72 hours  gabapentin Solution 100 milliGRAM(s) Enteral Tube at bedtime  insulin lispro (ADMELOG) corrective regimen sliding scale   SubCutaneous every 6 hours  levothyroxine 100 MICROGram(s) Enteral Tube daily  multivitamin/minerals/iron Oral Solution (CENTRUM) 15 milliLiter(s) Oral daily  pantoprazole   Suspension 40 milliGRAM(s) Enteral Tube daily  predniSONE   Tablet 5 milliGRAM(s) Oral daily  QUEtiapine 12.5 milliGRAM(s) Oral at bedtime  simethicone 80 milliGRAM(s) Chew four times a day  sodium chloride 3%  Inhalation 4 milliLiter(s) Inhalation every 6 hours  vancomycin  IVPB 1000 milliGRAM(s) IV Intermittent every 12 hours  zinc sulfate 220 milliGRAM(s) Oral daily    MEDICATIONS  (PRN):  sodium chloride 0.65% Nasal 2 Spray(s) Both Nostrils two times a day PRN Nasal Congestion        CAPILLARY BLOOD GLUCOSE      POCT Blood Glucose.: 162 mg/dL (28 Oct 2023 17:22)  POCT Blood Glucose.: 198 mg/dL (28 Oct 2023 12:03)  POCT Blood Glucose.: 90 mg/dL (28 Oct 2023 06:17)  POCT Blood Glucose.: 127 mg/dL (27 Oct 2023 23:49)    I&O's Summary    27 Oct 2023 07:01  -  28 Oct 2023 07:00  --------------------------------------------------------  IN: 200 mL / OUT: 400 mL / NET: -200 mL    28 Oct 2023 07:01  -  28 Oct 2023 19:00  --------------------------------------------------------  IN: 710 mL / OUT: 0 mL / NET: 710 mL        PHYSICAL EXAM:  Vital Signs Last 24 Hrs  T(C): 35.8 (29 Oct 2023 09:00), Max: 37.1 (28 Oct 2023 18:00)  T(F): 96.4 (29 Oct 2023 09:00), Max: 98.7 (28 Oct 2023 18:00)  HR: 78 (29 Oct 2023 15:54) (68 - 88)  BP: 140/69 (29 Oct 2023 15:54) (114/76 - 154/71)  BP(mean): --  RR: 20 (29 Oct 2023 09:00) (14 - 20)  SpO2: 100% (29 Oct 2023 15:54) (97% - 100%)    Parameters below as of 29 Oct 2023 15:54  Patient On (Oxygen Delivery Method): ventilator            GENERAL: NAD, well-developed  HEAD:  Atraumatic, Normocephalic  EYES: EOMI, PERRLA, conjunctiva and sclera clear  NECK: Supple, No JVD. Trach in place, connected to mech vent  CHEST/LUNG: Clear to auscultation bilaterally; No wheeze  HEART: Regular rate and rhythm; No murmurs, rubs, or gallops  ABDOMEN: Soft, tender, Nondistended; Bowel sounds present PEG in place  EXTREMITIES:  2+ Peripheral Pulses, No clubbing, cyanosis, or edema  PSYCH: AAOx3  NEUROLOGY: Bed bound, functionally quadriplegic  SKIN: No rashes or lesions    LABS:                        8.5    6.07  )-----------( 117      ( 28 Oct 2023 07:19 )             28.4     10-28    135  |  100  |  16  ----------------------------<  77  3.3<L>   |  27  |  <0.30<L>    Ca    8.3<L>      28 Oct 2023 07:19  Phos  3.2     10-28  Mg     1.9     10-28    TPro  6.7  /  Alb  3.0<L>  /  TBili  0.2  /  DBili  x   /  AST  21  /  ALT  23  /  AlkPhos  47  10-28    PT/INR - ( 27 Oct 2023 14:39 )   PT: 12.3 sec;   INR: 1.12 ratio         PTT - ( 27 Oct 2023 14:39 )  PTT:30.8 sec      Urinalysis Basic - ( 28 Oct 2023 07:19 )    Color: x / Appearance: x / SG: x / pH: x  Gluc: 77 mg/dL / Ketone: x  / Bili: x / Urobili: x   Blood: x / Protein: x / Nitrite: x   Leuk Esterase: x / RBC: x / WBC x   Sq Epi: x / Non Sq Epi: x / Bacteria: x        RADIOLOGY & ADDITIONAL TESTS:    Imaging Personally Reviewed:    Consultant(s) Notes Reviewed:      Care Discussed with Consultants/Other Providers:

## 2023-10-29 NOTE — DIETITIAN INITIAL EVALUATION ADULT - NS FNS DIET ORDER
Diet, NPO with Tube Feed:   Tube Feeding Modality: Gastrostomy  Glucerna 1.5 [place] (GLUCERNA1.5RTH)  Total Volume for 24 Hours (mL): 960  Continuous  Starting Tube Feed Rate {mL per Hour}: 10  Increase Tube Feed Rate by (mL): 10     Every 4 hours  Until Goal Tube Feed Rate (mL per Hour): 40  Tube Feed Duration (in Hours): 24  Tube Feed Start Time: 13:00  Alfred(7 Gm Arginine/7 Gm Glut/1.2 Gm HMB     Qty per Day:  3  No Carb Prosource TF     Qty per Day:  1 (10-29-23 @ 08:59)

## 2023-10-29 NOTE — DIETITIAN INITIAL EVALUATION ADULT - REASON FOR ADMISSION
74 y/o F with PMHx of T2DM, HTN, HLD, anemia, hypothyroidism, RA, fibromyalgia, remote cerebral aneurysm repair, acute hypercapnic respiratory failure now with tracheostomy, PEG tube, and bedbound (trach change 8/18, PEG change 8/14), sacral pressure ulcer, BIBEMS from home for 6 day hx of bleeding from the tracheostomy and PEG tube with associated abdominal pain, recent history of auditory and visual hallucinations, and with chronic sacral decubitus ulcer.

## 2023-10-29 NOTE — DIETITIAN INITIAL EVALUATION ADULT - PERTINENT MEDS FT
MEDICATIONS  (STANDING):  acetaminophen   IVPB .. 1000 milliGRAM(s) IV Intermittent once  albuterol/ipratropium for Nebulization 3 milliLiter(s) Nebulizer every 6 hours  artificial  tears Solution 2 Drop(s) Both EYES every 6 hours  aztreonam  IVPB      aztreonam  IVPB 1000 milliGRAM(s) IV Intermittent every 8 hours  calcium carbonate    500 mG (Tums) Chewable 1 Tablet(s) Chew every 12 hours  chlorhexidine 0.12% Liquid 15 milliLiter(s) Oral Mucosa every 12 hours  chlorhexidine 4% Liquid 1 Application(s) Topical <User Schedule>  dextrose 50% Injectable 50 milliLiter(s) IV Push once  escitalopram 10 milliGRAM(s) Oral daily  fentaNYL   Patch  25 MICROgram(s)/Hr 1 Patch Transdermal every 72 hours  gabapentin Solution 100 milliGRAM(s) Enteral Tube at bedtime  insulin lispro (ADMELOG) corrective regimen sliding scale   SubCutaneous every 6 hours  levothyroxine 100 MICROGram(s) Enteral Tube daily  multivitamin/minerals/iron Oral Solution (CENTRUM) 15 milliLiter(s) Oral daily  pantoprazole   Suspension 40 milliGRAM(s) Enteral Tube daily  predniSONE   Tablet 5 milliGRAM(s) Oral daily  QUEtiapine 12.5 milliGRAM(s) Oral at bedtime  simethicone 80 milliGRAM(s) Chew four times a day  sodium chloride 3%  Inhalation 4 milliLiter(s) Inhalation every 6 hours  vancomycin  IVPB      vancomycin  IVPB 1000 milliGRAM(s) IV Intermittent every 12 hours  zinc sulfate 220 milliGRAM(s) Oral daily    MEDICATIONS  (PRN):  sodium chloride 0.65% Nasal 2 Spray(s) Both Nostrils two times a day PRN Nasal Congestion

## 2023-10-29 NOTE — PROGRESS NOTE ADULT - NS ATTEND AMEND GEN_ALL_CORE FT
agree with above  pt is hemodynamically stable and afebrile  continue aztreonam and vanco for now -  sputum with pseudomonas, and sacral wound  pending MRI sacrum  GI appreciated  There has been no sig trach bleeding  Medicine input appreciated   evaluation tomorrow

## 2023-10-29 NOTE — CONSULT NOTE ADULT - SUBJECTIVE AND OBJECTIVE BOX
Date of service: 10-29-23 @ 19:12    HPI:    The patient is a 73 F w hx of cerebral aneurysm rupture, s/p trach/peg presenting with bleeding from tracheostomy. Her family has noticed some bleeding from the PEG site. At the hospital there has been some leakage around the PEG site.    The patient does not participate in the history.  The PEG was placed at an OSH approximately 1 y ago and has been exchanged.    Allergies:  penicillin (Unknown)  heparin (Unknown)  Tagamet (Unknown)  pineapple (Unknown)  walnut (Unknown)  Pecan, Filbert, Hazelnut (Unknown)  Lyrica (Unknown)      Home Medications:    Hospital Medications:  acetaminophen   IVPB .. 1000 milliGRAM(s) IV Intermittent once  albuterol/ipratropium for Nebulization 3 milliLiter(s) Nebulizer every 6 hours  artificial  tears Solution 2 Drop(s) Both EYES every 6 hours  aztreonam  IVPB      aztreonam  IVPB 1000 milliGRAM(s) IV Intermittent every 8 hours  calcium carbonate    500 mG (Tums) Chewable 1 Tablet(s) Chew every 12 hours  chlorhexidine 0.12% Liquid 15 milliLiter(s) Oral Mucosa every 12 hours  chlorhexidine 4% Liquid 1 Application(s) Topical <User Schedule>  dextrose 50% Injectable 50 milliLiter(s) IV Push once  escitalopram 10 milliGRAM(s) Oral daily  fentaNYL   Patch  25 MICROgram(s)/Hr 1 Patch Transdermal every 72 hours  gabapentin Solution 100 milliGRAM(s) Enteral Tube at bedtime  insulin lispro (ADMELOG) corrective regimen sliding scale   SubCutaneous every 6 hours  levothyroxine 100 MICROGram(s) Enteral Tube daily  multivitamin/minerals/iron Oral Solution (CENTRUM) 15 milliLiter(s) Oral daily  pantoprazole   Suspension 40 milliGRAM(s) Enteral Tube daily  predniSONE   Tablet 5 milliGRAM(s) Oral daily  QUEtiapine 12.5 milliGRAM(s) Oral at bedtime  simethicone 80 milliGRAM(s) Chew four times a day  sodium chloride 0.65% Nasal 2 Spray(s) Both Nostrils two times a day PRN  sodium chloride 3%  Inhalation 4 milliLiter(s) Inhalation every 6 hours  vancomycin  IVPB      vancomycin  IVPB 1000 milliGRAM(s) IV Intermittent every 12 hours  zinc sulfate 220 milliGRAM(s) Oral daily      PMHX/PSHX:  Diabetes    Rheumatoid arthritis    Fibromyalgia    Hypothyroid    Hypertension    H/O tracheostomy    PEG (percutaneous endoscopic gastrostomy) status        Family history:      Social History:   Denies ethanol use.  Denies illicit drug use.    ROS:     General:  No wt loss, fevers, chills, night sweats, fatigue,   Eyes:  Good vision, no reported pain  ENT:  No sore throat, pain, runny nose, dysphagia  CV:  No pain, palpitations, hypo/hypertension  Resp:  No dyspnea, cough, tachypnea, wheezing  GI:  See HPI  :  No pain, bleeding, incontinence, nocturia  Muscle:  No pain, weakness  Neuro:  No weakness, tingling, memory problems  Psych:  No fatigue, insomnia, mood problems, depression  Endocrine:  No polyuria, polydipsia, cold/heat intolerance  Heme:  No petechiae, ecchymosis, easy bruisability  Integumentary:  No rash, edema      PHYSICAL EXAM:     GENERAL:  Appears stated age, well-groomed, well-nourished, no distress  HEENT:  NC/AT,  conjunctivae anicteric, clear and pink,   NECK: supple, trachea midline  CHEST:  Full & symmetric excursion, no increased effort, breath sounds clear  HEART:  Regular rhythm, no JVD  ABDOMEN:  Soft, non-tender, non-distended, normoactive bowel sounds,  no masses , no hepatosplenomegaly, gastrostomy  EXTREMITIES:  no cyanosis,clubbing or edema  SKIN:  No rash, erythema, or, ecchymoses, no jaundice  NEURO:  Alert, non-focal, no asterixis  PSYCH: Appropriate affect, oriented to place and time  RECTAL: Deferred      Vital Signs:  Vital Signs Last 24 Hrs  T(C): 37.6 (29 Oct 2023 18:43), Max: 37.9 (29 Oct 2023 18:08)  T(F): 99.6 (29 Oct 2023 18:43), Max: 100.3 (29 Oct 2023 18:08)  HR: 80 (29 Oct 2023 18:08) (68 - 88)  BP: 143/70 (29 Oct 2023 18:08) (114/76 - 154/71)  BP(mean): --  RR: 18 (29 Oct 2023 18:08) (14 - 20)  SpO2: 99% (29 Oct 2023 18:08) (97% - 100%)    Parameters below as of 29 Oct 2023 18:08  Patient On (Oxygen Delivery Method): ventilator      Daily     Daily     LABS: Labs personally reviewed by me:                        10.0   6.98  )-----------( 138      ( 29 Oct 2023 11:51 )             33.0     10-29    135  |  99  |  20  ----------------------------<  263<H>  4.2   |  27  |  <0.30<L>    Ca    9.3      29 Oct 2023 11:51  Phos  3.4     10-29  Mg     1.9     10-29    TPro  7.3  /  Alb  3.2<L>  /  TBili  0.2  /  DBili  x   /  AST  30  /  ALT  36  /  AlkPhos  62  10-29    LIVER FUNCTIONS - ( 29 Oct 2023 11:51 )  Alb: 3.2 g/dL / Pro: 7.3 g/dL / ALK PHOS: 62 U/L / ALT: 36 U/L / AST: 30 U/L / GGT: x           PTT - ( 29 Oct 2023 11:51 )  PTT:32.5 sec  Urinalysis Basic - ( 29 Oct 2023 11:51 )    Color: x / Appearance: x / SG: x / pH: x  Gluc: 263 mg/dL / Ketone: x  / Bili: x / Urobili: x   Blood: x / Protein: x / Nitrite: x   Leuk Esterase: x / RBC: x / WBC x   Sq Epi: x / Non Sq Epi: x / Bacteria: x          Imaging personally reviewed by me:

## 2023-10-29 NOTE — PHYSICAL THERAPY INITIAL EVALUATION ADULT - PERTINENT HX OF CURRENT PROBLEM, REHAB EVAL
74 y/o F with PMHx of T2DM, HTN, HLD, anemia, hypothyroidism, RA, fibromyalgia, remote cerebral aneurysm repair, acute hypercapnic respiratory failure now with tracheostomy, PEG tube, and bedbound (trach change 8/18, PEG change 8/14), sacral pressure ulcer, BIBEMS from home for 6 day hx of bleeding from the tracheostomy and PEG tube with associated abdominal pain. Patient still having regular bowel movements, last one occurred prior to ED arrival. Was seen outpatient on 10/26 by critical care Dr. Zaki Gottlieb and apparently had cultures sent at that time. Patient also noted to have chronic sacral decubitous ulcer. Family endorsed one episode of fever, though was not febrile on evaluation. Daughter noted patient was recently experiencing auditory and visual hallucinations (seeing animals that were not there & hearing a "bomb" going off outside her home), though patient not actively hallucinating on evaluation.    ED course notable for CT neck imaging showing no evidence of active bleeding around the tracheostomy site, no hematomas, and no drainable collection around the tracheostomy site. CT abdomen/pelvis notable for gastrostomy tube localized to the stomach with mild thickening noted along the tract; a sacral decubitus ulcer extending to the coccyx with osseous remodeling, possibly representing osteomyelitis; and bibasilar patchy opacities with volume loss in the lungs, representing atelectasis vs infection. Patient admitted for respiratory support, wound care of tracheostomy and PEG, and management of presumed sacral osteomyelitis.

## 2023-10-29 NOTE — CONSULT NOTE ADULT - SUBJECTIVE AND OBJECTIVE BOX
Patient is a 73 Female who presents with a chief complaint of bleeding from trach and PEG    HPI:  73 F PMH T2DM (A1c 7.5), HTN, HLD, anemia, hypothyroidism, RA, fibromyalgia, remote cerebral aneurysm repair, respiratory failure s/p Trach, s/p PEG, bedbound (trach change 8/18, PEG change 8/14), sacral wound, brought in for bleeding from trach and PEG site.     Chronic sacral decubitus for which pt has wound care done at home. Outpatient blood and urine cx taken prior to admission are negative for 48h as per daughter. Here pt afebrile, FiO2 30% Trach to Vent, WBC 10. RVP negative, blood and urine cx negative to date, sputum cx pending. CXR with bibasilar patchy opacities. CT A/P showed sacral decub with osseous remodelling, bibasilar patchy opacities (atelectasis vs infection), and G tube with mild tract thickening. ID consulted for recommendations.      REVIEW OF SYSTEMS  Trach to Vent     prior hospital charts reviewed [V]  primary team notes reviewed [V]  other consultant notes reviewed [V]    PAST MEDICAL & SURGICAL HISTORY:  Diabetes    Rheumatoid arthritis    Fibromyalgia    Hypothyroid    Hypertension    H/O tracheostomy    PEG (percutaneous endoscopic gastrostomy) status    SOCIAL HISTORY:  Trach to Vent     FAMILY HISTORY:    Allergies  penicillin (Unknown)  heparin (Unknown)  Tagamet (Unknown)  pineapple (Unknown)  walnut (Unknown)  Pecan, Filbert, Hazelnut (Unknown)  Lyrica (Unknown)    ANTIMICROBIALS:  aztreonam  IVPB    aztreonam  IVPB 1000 every 8 hours  vancomycin  IVPB    vancomycin  IVPB 1000 every 12 hours    ANTIMICROBIALS (past 90 days):  MEDICATIONS  (STANDING):  aztreonam  IVPB   50 mL/Hr IV Intermittent (10-28-23 @ 01:42)    aztreonam  IVPB   50 mL/Hr IV Intermittent (10-29-23 @ 05:39)   50 mL/Hr IV Intermittent (10-28-23 @ 22:47)   50 mL/Hr IV Intermittent (10-28-23 @ 14:31)   50 mL/Hr IV Intermittent (10-28-23 @ 05:53)    vancomycin  IVPB   250 mL/Hr IV Intermittent (10-28-23 @ 23:29)    vancomycin  IVPB   250 mL/Hr IV Intermittent (10-28-23 @ 11:39)    vancomycin  IVPB.   250 mL/Hr IV Intermittent (10-27-23 @ 21:48)    OTHER MEDS:   MEDICATIONS  (STANDING):  acetaminophen   IVPB .. 1000 once  albuterol/ipratropium for Nebulization 3 every 6 hours  calcium carbonate    500 mG (Tums) Chewable 1 every 12 hours  dextrose 50% Injectable 50 once  escitalopram 10 daily  fentaNYL   Patch  25 MICROgram(s)/Hr 1 every 72 hours  gabapentin Solution 100 at bedtime  insulin lispro (ADMELOG) corrective regimen sliding scale  every 6 hours  levothyroxine 100 daily  pantoprazole   Suspension 40 daily  predniSONE   Tablet 5 daily  QUEtiapine 12.5 at bedtime  simethicone 80 four times a day  sodium chloride 3%  Inhalation 4 every 6 hours    VITALS:  Vital Signs Last 24 Hrs  T(F): 98.6 (10-29-23 @ 05:27), Max: 98.7 (10-27-23 @ 14:15)    Vital Signs Last 24 Hrs  HR: 78 (10-29-23 @ 06:18) (65 - 82)  BP: 129/59 (10-29-23 @ 05:27) (114/76 - 147/67)  RR: 18 (10-29-23 @ 05:27)  SpO2: 100% (10-29-23 @ 06:18) (100% - 100%)  Wt(kg): --    EXAM:  General: Patient in no acute distress   HEENT: NCAT  Trach to Vent   CV: S1+S2  Lungs: Crackles b/l   Abd: Soft  Ext: No cyanosis  Neuro: Calm   Skin: No rash     Labs:                        8.5    6.07  )-----------( 117      ( 28 Oct 2023 07:19 )             28.4     10-28    135  |  100  |  16  ----------------------------<  77  3.3<L>   |  27  |  <0.30<L>    Ca    8.3<L>      28 Oct 2023 07:19  Phos  3.2     10-28  Mg     1.9     10-28    TPro  6.7  /  Alb  3.0<L>  /  TBili  0.2  /  DBili  x   /  AST  21  /  ALT  23  /  AlkPhos  47  10-28    WBC Trend:  WBC Count: 6.07 (10-28-23 @ 07:19)  WBC Count: 10.89 (10-27-23 @ 14:39)    Auto Neutrophil #: 9.84 K/uL (10-27-23 @ 14:39)    Creatine Trend:  Creatinine: <0.30 (10-28)  Creatinine: <0.30 (10-27)    Liver Biochemical Testing Trend:  Alanine Aminotransferase (ALT/SGPT): 23 (10-28)  Alanine Aminotransferase (ALT/SGPT): 32 (10-27)  Aspartate Aminotransferase (AST/SGOT): 21 (10-28-23 @ 07:19)  Aspartate Aminotransferase (AST/SGOT): 38 (10-27-23 @ 14:39)  Bilirubin Total: 0.2 (10-28)  Bilirubin Total: 0.4 (10-27)    Auto Eosinophil %: 0.0 % (10-27-23 @ 14:39)    Urinalysis Basic - ( 28 Oct 2023 07:19 )    Color: x / Appearance: x / SG: x / pH: x  Gluc: 77 mg/dL / Ketone: x  / Bili: x / Urobili: x   Blood: x / Protein: x / Nitrite: x   Leuk Esterase: x / RBC: x / WBC x   Sq Epi: x / Non Sq Epi: x / Bacteria: x    MICROBIOLOGY:    Culture - Sputum (collected 28 Oct 2023 07:16)  Source: .Sputum Sputum    Culture - Urine (collected 27 Oct 2023 18:16)  Source: Clean Catch Clean Catch (Midstream)  Final Report:    No growth    Culture - Blood (collected 27 Oct 2023 14:00)  Source: .Blood Blood-Peripheral  Preliminary Report:    No growth at 24 hours    Culture - Blood (collected 27 Oct 2023 13:55)  Source: .Blood Blood-Peripheral  Preliminary Report:    No growth at 24 hours    Rapid RVP Result: NotDetec (10-28 @ 07:19)  Legionella Antigen, Urine: Negative (10-28 @ 07:16)    C-Reactive Protein, Serum: 104 (10-27)    Blood Gas Venous - Lactate: 1.3 (10-28 @ 11:19)  Lactate, Blood: 2.1 (10-27 @ 18:16)  Blood Gas Venous - Lactate: 2.1 (10-27 @ 14:00)    A1C with Estimated Average Glucose Result: 7.5 % (10-28-23 @ 07:18)    RADIOLOGY:  imaging below personally reviewed    < from: Xray Chest 1 View- PORTABLE-Urgent (Xray Chest 1 View- PORTABLE-Urgent .) (10.27.23 @ 17:31) >  FINDINGS/  IMPRESSION:  Tracheostomy tube.  The heart is normal in size.  Low lung volumes. Bibasilar patchy opacities, consider subsegmental   atelectasis versus infection/pneumonia in proper clinical context.  There is no pneumothorax or pleural effusion.  Generalized osteopenia. No acute bony abnormality.    --- End of Report ---    < end of copied text >  < from: CT Abdomen and Pelvis w/ IV Cont (10.27.23 @ 17:23) >    IMPRESSION:    Gastrostomy tube localized to the stomach with mild thickening along the   tract. Punctate hyperdense focus along the gastrostomy tube tract, image   48 series 12, possibly prior postoperative change versus focus of   bleeding or hyperemia. Lack of a precontrast phase limits evaluation.    Sacral decubitus ulcer extending to the coccyx with osseous remodeling,   image 124 series 2. Recommend pelvic MRI or bone scan to exclude   osteomyelitis.    Bibasilar patchy lower thorax/lingular consolidative opacities with   volume loss, concerning for atelectasis or infection. Follow to   resolution.    Age-indeterminate mild height loss of T12, L2, and L3 vertebral bodies.   Old left pubic ramus fractures.    --- End of Report ---    < end of copied text >

## 2023-10-29 NOTE — DIETITIAN INITIAL EVALUATION ADULT - REASON INDICATOR FOR ASSESSMENT
seen for length of stay and consult for nutrition support and pressure injury stage 2 or >. information obtained from pt  at bedside, electronic medical record

## 2023-10-29 NOTE — PROGRESS NOTE ADULT - PROBLEM SELECTOR PLAN 3
Chronic Trach/ Vent dependant 2/2 Hypercapnic Resp Failure since 10/2022   -S/p Trach # 8 Cuffed Flex Shiley; trach change 8/18, PEG change 8/14 per records   -Continue Home vent settings: (300 TV/ RR 12/5 Peep/ Fio2 30%)  -Tolerates intermittent PSV during day/ Vent HS  - Airway Clearance: Duoneb, Hypersal, Chest PT, PRN suctioning   - Maintain 02 sat > 92%    Tracheal Bleeding (Intermittent):   -S/p CT Angio neck: No evidence of active bleeding around tracheostomy site. No hematoma.  No collection   - Advise RN staff to use Red rubber trach catheters to avoid suction trauma   - Will consider ENT evaluation for scope If persist Chronic Trach/ Vent dependant 2/2 Hypercapnic Resp Failure since 10/2022   -S/p Trach # 8 Cuffed Flex Shiley; trach change 8/18, PEG change 8/14 per records   -Continue Home vent settings: (300 TV/ RR 12/5 Peep/ Fio2 30%)  -Tolerates intermittent PSV 15/5 during day/ Vent HS  - Airway Clearance: Duoneb, Hypersal, Chest PT, PRN suctioning   - Maintain 02 sat > 92%    Tracheal Bleeding (Intermittent):   -S/p CT Angio neck: No evidence of active bleeding around tracheostomy site. No hematoma.  No collection   - Advise RN staff to use Red rubber trach catheters to avoid suction trauma   - Will consider ENT evaluation for scope If persist

## 2023-10-30 LAB
-  AMPICILLIN/SULBACTAM: SIGNIFICANT CHANGE UP
-  AMPICILLIN/SULBACTAM: SIGNIFICANT CHANGE UP
-  CEFAZOLIN: SIGNIFICANT CHANGE UP
-  CEFAZOLIN: SIGNIFICANT CHANGE UP
-  CLINDAMYCIN: SIGNIFICANT CHANGE UP
-  CLINDAMYCIN: SIGNIFICANT CHANGE UP
-  ERYTHROMYCIN: SIGNIFICANT CHANGE UP
-  ERYTHROMYCIN: SIGNIFICANT CHANGE UP
-  GENTAMICIN: SIGNIFICANT CHANGE UP
-  GENTAMICIN: SIGNIFICANT CHANGE UP
-  OXACILLIN: SIGNIFICANT CHANGE UP
-  OXACILLIN: SIGNIFICANT CHANGE UP
-  PENICILLIN: SIGNIFICANT CHANGE UP
-  PENICILLIN: SIGNIFICANT CHANGE UP
-  RIFAMPIN: SIGNIFICANT CHANGE UP
-  RIFAMPIN: SIGNIFICANT CHANGE UP
-  TETRACYCLINE: SIGNIFICANT CHANGE UP
-  TETRACYCLINE: SIGNIFICANT CHANGE UP
-  TRIMETHOPRIM/SULFAMETHOXAZOLE: SIGNIFICANT CHANGE UP
-  TRIMETHOPRIM/SULFAMETHOXAZOLE: SIGNIFICANT CHANGE UP
-  VANCOMYCIN: SIGNIFICANT CHANGE UP
-  VANCOMYCIN: SIGNIFICANT CHANGE UP
ALBUMIN SERPL ELPH-MCNC: 2.9 G/DL — LOW (ref 3.3–5)
ALBUMIN SERPL ELPH-MCNC: 2.9 G/DL — LOW (ref 3.3–5)
ALP SERPL-CCNC: 52 U/L — SIGNIFICANT CHANGE UP (ref 40–120)
ALP SERPL-CCNC: 52 U/L — SIGNIFICANT CHANGE UP (ref 40–120)
ALT FLD-CCNC: 27 U/L — SIGNIFICANT CHANGE UP (ref 10–45)
ALT FLD-CCNC: 27 U/L — SIGNIFICANT CHANGE UP (ref 10–45)
ANION GAP SERPL CALC-SCNC: 10 MMOL/L — SIGNIFICANT CHANGE UP (ref 5–17)
ANION GAP SERPL CALC-SCNC: 10 MMOL/L — SIGNIFICANT CHANGE UP (ref 5–17)
APTT BLD: 30 SEC — SIGNIFICANT CHANGE UP (ref 24.5–35.6)
APTT BLD: 30 SEC — SIGNIFICANT CHANGE UP (ref 24.5–35.6)
AST SERPL-CCNC: 23 U/L — SIGNIFICANT CHANGE UP (ref 10–40)
AST SERPL-CCNC: 23 U/L — SIGNIFICANT CHANGE UP (ref 10–40)
BILIRUB SERPL-MCNC: 0.2 MG/DL — SIGNIFICANT CHANGE UP (ref 0.2–1.2)
BILIRUB SERPL-MCNC: 0.2 MG/DL — SIGNIFICANT CHANGE UP (ref 0.2–1.2)
BUN SERPL-MCNC: 25 MG/DL — HIGH (ref 7–23)
BUN SERPL-MCNC: 25 MG/DL — HIGH (ref 7–23)
CALCIUM SERPL-MCNC: 8.5 MG/DL — SIGNIFICANT CHANGE UP (ref 8.4–10.5)
CALCIUM SERPL-MCNC: 8.5 MG/DL — SIGNIFICANT CHANGE UP (ref 8.4–10.5)
CHLORIDE SERPL-SCNC: 99 MMOL/L — SIGNIFICANT CHANGE UP (ref 96–108)
CHLORIDE SERPL-SCNC: 99 MMOL/L — SIGNIFICANT CHANGE UP (ref 96–108)
CO2 SERPL-SCNC: 27 MMOL/L — SIGNIFICANT CHANGE UP (ref 22–31)
CO2 SERPL-SCNC: 27 MMOL/L — SIGNIFICANT CHANGE UP (ref 22–31)
CREAT SERPL-MCNC: <0.3 MG/DL — LOW (ref 0.5–1.3)
CREAT SERPL-MCNC: <0.3 MG/DL — LOW (ref 0.5–1.3)
EGFR: 113 ML/MIN/1.73M2 — SIGNIFICANT CHANGE UP
EGFR: 113 ML/MIN/1.73M2 — SIGNIFICANT CHANGE UP
GLUCOSE BLDC GLUCOMTR-MCNC: 141 MG/DL — HIGH (ref 70–99)
GLUCOSE BLDC GLUCOMTR-MCNC: 141 MG/DL — HIGH (ref 70–99)
GLUCOSE BLDC GLUCOMTR-MCNC: 145 MG/DL — HIGH (ref 70–99)
GLUCOSE BLDC GLUCOMTR-MCNC: 145 MG/DL — HIGH (ref 70–99)
GLUCOSE BLDC GLUCOMTR-MCNC: 187 MG/DL — HIGH (ref 70–99)
GLUCOSE BLDC GLUCOMTR-MCNC: 187 MG/DL — HIGH (ref 70–99)
GLUCOSE BLDC GLUCOMTR-MCNC: 237 MG/DL — HIGH (ref 70–99)
GLUCOSE BLDC GLUCOMTR-MCNC: 237 MG/DL — HIGH (ref 70–99)
GLUCOSE BLDC GLUCOMTR-MCNC: 303 MG/DL — HIGH (ref 70–99)
GLUCOSE BLDC GLUCOMTR-MCNC: 303 MG/DL — HIGH (ref 70–99)
GLUCOSE BLDC GLUCOMTR-MCNC: 321 MG/DL — HIGH (ref 70–99)
GLUCOSE BLDC GLUCOMTR-MCNC: 321 MG/DL — HIGH (ref 70–99)
GLUCOSE SERPL-MCNC: 186 MG/DL — HIGH (ref 70–99)
GLUCOSE SERPL-MCNC: 186 MG/DL — HIGH (ref 70–99)
HCT VFR BLD CALC: 30.2 % — LOW (ref 34.5–45)
HCT VFR BLD CALC: 30.2 % — LOW (ref 34.5–45)
HGB BLD-MCNC: 8.9 G/DL — LOW (ref 11.5–15.5)
HGB BLD-MCNC: 8.9 G/DL — LOW (ref 11.5–15.5)
MAGNESIUM SERPL-MCNC: 1.7 MG/DL — SIGNIFICANT CHANGE UP (ref 1.6–2.6)
MAGNESIUM SERPL-MCNC: 1.7 MG/DL — SIGNIFICANT CHANGE UP (ref 1.6–2.6)
MCHC RBC-ENTMCNC: 27.6 PG — SIGNIFICANT CHANGE UP (ref 27–34)
MCHC RBC-ENTMCNC: 27.6 PG — SIGNIFICANT CHANGE UP (ref 27–34)
MCHC RBC-ENTMCNC: 29.5 GM/DL — LOW (ref 32–36)
MCHC RBC-ENTMCNC: 29.5 GM/DL — LOW (ref 32–36)
MCV RBC AUTO: 93.8 FL — SIGNIFICANT CHANGE UP (ref 80–100)
MCV RBC AUTO: 93.8 FL — SIGNIFICANT CHANGE UP (ref 80–100)
METHOD TYPE: SIGNIFICANT CHANGE UP
METHOD TYPE: SIGNIFICANT CHANGE UP
NRBC # BLD: 0 /100 WBCS — SIGNIFICANT CHANGE UP (ref 0–0)
NRBC # BLD: 0 /100 WBCS — SIGNIFICANT CHANGE UP (ref 0–0)
PHOSPHATE SERPL-MCNC: 3 MG/DL — SIGNIFICANT CHANGE UP (ref 2.5–4.5)
PHOSPHATE SERPL-MCNC: 3 MG/DL — SIGNIFICANT CHANGE UP (ref 2.5–4.5)
PLATELET # BLD AUTO: 144 K/UL — LOW (ref 150–400)
PLATELET # BLD AUTO: 144 K/UL — LOW (ref 150–400)
POTASSIUM SERPL-MCNC: 3.6 MMOL/L — SIGNIFICANT CHANGE UP (ref 3.5–5.3)
POTASSIUM SERPL-MCNC: 3.6 MMOL/L — SIGNIFICANT CHANGE UP (ref 3.5–5.3)
POTASSIUM SERPL-SCNC: 3.6 MMOL/L — SIGNIFICANT CHANGE UP (ref 3.5–5.3)
POTASSIUM SERPL-SCNC: 3.6 MMOL/L — SIGNIFICANT CHANGE UP (ref 3.5–5.3)
PROT SERPL-MCNC: 6.5 G/DL — SIGNIFICANT CHANGE UP (ref 6–8.3)
PROT SERPL-MCNC: 6.5 G/DL — SIGNIFICANT CHANGE UP (ref 6–8.3)
RBC # BLD: 3.22 M/UL — LOW (ref 3.8–5.2)
RBC # BLD: 3.22 M/UL — LOW (ref 3.8–5.2)
RBC # FLD: 18.2 % — HIGH (ref 10.3–14.5)
RBC # FLD: 18.2 % — HIGH (ref 10.3–14.5)
SODIUM SERPL-SCNC: 136 MMOL/L — SIGNIFICANT CHANGE UP (ref 135–145)
SODIUM SERPL-SCNC: 136 MMOL/L — SIGNIFICANT CHANGE UP (ref 135–145)
WBC # BLD: 8.7 K/UL — SIGNIFICANT CHANGE UP (ref 3.8–10.5)
WBC # BLD: 8.7 K/UL — SIGNIFICANT CHANGE UP (ref 3.8–10.5)
WBC # FLD AUTO: 8.7 K/UL — SIGNIFICANT CHANGE UP (ref 3.8–10.5)
WBC # FLD AUTO: 8.7 K/UL — SIGNIFICANT CHANGE UP (ref 3.8–10.5)

## 2023-10-30 PROCEDURE — 72195 MRI PELVIS W/O DYE: CPT | Mod: 26

## 2023-10-30 PROCEDURE — 99222 1ST HOSP IP/OBS MODERATE 55: CPT

## 2023-10-30 PROCEDURE — 99233 SBSQ HOSP IP/OBS HIGH 50: CPT

## 2023-10-30 PROCEDURE — 99232 SBSQ HOSP IP/OBS MODERATE 35: CPT

## 2023-10-30 RX ORDER — ACETAMINOPHEN 500 MG
650 TABLET ORAL EVERY 6 HOURS
Refills: 0 | Status: DISCONTINUED | OUTPATIENT
Start: 2023-10-30 | End: 2023-11-01

## 2023-10-30 RX ORDER — ASCORBIC ACID 60 MG
500 TABLET,CHEWABLE ORAL DAILY
Refills: 0 | Status: DISCONTINUED | OUTPATIENT
Start: 2023-10-30 | End: 2023-12-25

## 2023-10-30 RX ORDER — INSULIN GLARGINE 100 [IU]/ML
10 INJECTION, SOLUTION SUBCUTANEOUS EVERY MORNING
Refills: 0 | Status: DISCONTINUED | OUTPATIENT
Start: 2023-10-31 | End: 2023-11-01

## 2023-10-30 RX ORDER — SENNA PLUS 8.6 MG/1
5 TABLET ORAL ONCE
Refills: 0 | Status: DISCONTINUED | OUTPATIENT
Start: 2023-10-30 | End: 2023-10-31

## 2023-10-30 RX ORDER — INSULIN GLARGINE 100 [IU]/ML
10 INJECTION, SOLUTION SUBCUTANEOUS ONCE
Refills: 0 | Status: COMPLETED | OUTPATIENT
Start: 2023-10-30 | End: 2023-10-30

## 2023-10-30 RX ORDER — INSULIN LISPRO 100/ML
VIAL (ML) SUBCUTANEOUS EVERY 6 HOURS
Refills: 0 | Status: DISCONTINUED | OUTPATIENT
Start: 2023-10-30 | End: 2023-12-05

## 2023-10-30 RX ORDER — MAGNESIUM SULFATE 500 MG/ML
2 VIAL (ML) INJECTION ONCE
Refills: 0 | Status: COMPLETED | OUTPATIENT
Start: 2023-10-30 | End: 2023-10-30

## 2023-10-30 RX ORDER — ACETYLCYSTEINE 200 MG/ML
4 VIAL (ML) MISCELLANEOUS EVERY 6 HOURS
Refills: 0 | Status: DISCONTINUED | OUTPATIENT
Start: 2023-10-30 | End: 2023-11-01

## 2023-10-30 RX ORDER — OXYBUTYNIN CHLORIDE 5 MG
5 TABLET ORAL DAILY
Refills: 0 | Status: DISCONTINUED | OUTPATIENT
Start: 2023-10-30 | End: 2023-12-04

## 2023-10-30 RX ORDER — CEFEPIME 1 G/1
2000 INJECTION, POWDER, FOR SOLUTION INTRAMUSCULAR; INTRAVENOUS EVERY 8 HOURS
Refills: 0 | Status: DISCONTINUED | OUTPATIENT
Start: 2023-10-30 | End: 2023-10-31

## 2023-10-30 RX ORDER — POLYETHYLENE GLYCOL 3350 17 G/17G
17 POWDER, FOR SOLUTION ORAL
Refills: 0 | Status: DISCONTINUED | OUTPATIENT
Start: 2023-10-30 | End: 2023-10-31

## 2023-10-30 RX ADMIN — Medication 100 MICROGRAM(S): at 06:10

## 2023-10-30 RX ADMIN — CHLORHEXIDINE GLUCONATE 1 APPLICATION(S): 213 SOLUTION TOPICAL at 06:10

## 2023-10-30 RX ADMIN — SIMETHICONE 80 MILLIGRAM(S): 80 TABLET, CHEWABLE ORAL at 18:58

## 2023-10-30 RX ADMIN — Medication 5 MILLIGRAM(S): at 06:10

## 2023-10-30 RX ADMIN — SODIUM CHLORIDE 4 MILLILITER(S): 9 INJECTION INTRAMUSCULAR; INTRAVENOUS; SUBCUTANEOUS at 11:01

## 2023-10-30 RX ADMIN — CEFEPIME 100 MILLIGRAM(S): 1 INJECTION, POWDER, FOR SOLUTION INTRAMUSCULAR; INTRAVENOUS at 18:56

## 2023-10-30 RX ADMIN — Medication 250 MILLIGRAM(S): at 13:44

## 2023-10-30 RX ADMIN — Medication 2 DROP(S): at 18:54

## 2023-10-30 RX ADMIN — Medication 400 MILLIGRAM(S): at 16:30

## 2023-10-30 RX ADMIN — SODIUM CHLORIDE 4 MILLILITER(S): 9 INJECTION INTRAMUSCULAR; INTRAVENOUS; SUBCUTANEOUS at 18:37

## 2023-10-30 RX ADMIN — GABAPENTIN 100 MILLIGRAM(S): 400 CAPSULE ORAL at 21:08

## 2023-10-30 RX ADMIN — POLYETHYLENE GLYCOL 3350 17 GRAM(S): 17 POWDER, FOR SOLUTION ORAL at 18:59

## 2023-10-30 RX ADMIN — Medication 25 GRAM(S): at 10:24

## 2023-10-30 RX ADMIN — CHLORHEXIDINE GLUCONATE 15 MILLILITER(S): 213 SOLUTION TOPICAL at 06:10

## 2023-10-30 RX ADMIN — Medication 2 DROP(S): at 00:34

## 2023-10-30 RX ADMIN — Medication 15 MILLILITER(S): at 12:36

## 2023-10-30 RX ADMIN — ZINC SULFATE TAB 220 MG (50 MG ZINC EQUIVALENT) 220 MILLIGRAM(S): 220 (50 ZN) TAB at 12:36

## 2023-10-30 RX ADMIN — ESCITALOPRAM OXALATE 10 MILLIGRAM(S): 10 TABLET, FILM COATED ORAL at 12:37

## 2023-10-30 RX ADMIN — QUETIAPINE FUMARATE 12.5 MILLIGRAM(S): 200 TABLET, FILM COATED ORAL at 21:08

## 2023-10-30 RX ADMIN — Medication 3 MILLILITER(S): at 18:37

## 2023-10-30 RX ADMIN — Medication 2 DROP(S): at 12:37

## 2023-10-30 RX ADMIN — Medication 3 MILLILITER(S): at 23:06

## 2023-10-30 RX ADMIN — CHLORHEXIDINE GLUCONATE 15 MILLILITER(S): 213 SOLUTION TOPICAL at 18:58

## 2023-10-30 RX ADMIN — Medication 1 TABLET(S): at 06:09

## 2023-10-30 RX ADMIN — CEFEPIME 100 MILLIGRAM(S): 1 INJECTION, POWDER, FOR SOLUTION INTRAMUSCULAR; INTRAVENOUS at 10:24

## 2023-10-30 RX ADMIN — Medication 4: at 12:37

## 2023-10-30 RX ADMIN — SIMETHICONE 80 MILLIGRAM(S): 80 TABLET, CHEWABLE ORAL at 21:09

## 2023-10-30 RX ADMIN — Medication 1 TABLET(S): at 18:55

## 2023-10-30 RX ADMIN — Medication 4 MILLILITER(S): at 23:06

## 2023-10-30 RX ADMIN — Medication 1: at 06:09

## 2023-10-30 RX ADMIN — Medication 50 MILLIGRAM(S): at 06:08

## 2023-10-30 RX ADMIN — SIMETHICONE 80 MILLIGRAM(S): 80 TABLET, CHEWABLE ORAL at 12:37

## 2023-10-30 RX ADMIN — Medication 3 MILLILITER(S): at 11:01

## 2023-10-30 RX ADMIN — Medication 2: at 00:34

## 2023-10-30 RX ADMIN — INSULIN GLARGINE 10 UNIT(S): 100 INJECTION, SOLUTION SUBCUTANEOUS at 15:45

## 2023-10-30 RX ADMIN — SIMETHICONE 80 MILLIGRAM(S): 80 TABLET, CHEWABLE ORAL at 06:09

## 2023-10-30 RX ADMIN — SODIUM CHLORIDE 4 MILLILITER(S): 9 INJECTION INTRAMUSCULAR; INTRAVENOUS; SUBCUTANEOUS at 05:46

## 2023-10-30 RX ADMIN — SIMETHICONE 80 MILLIGRAM(S): 80 TABLET, CHEWABLE ORAL at 00:34

## 2023-10-30 RX ADMIN — Medication 2 DROP(S): at 06:09

## 2023-10-30 RX ADMIN — Medication 3 MILLILITER(S): at 05:45

## 2023-10-30 RX ADMIN — PANTOPRAZOLE SODIUM 40 MILLIGRAM(S): 20 TABLET, DELAYED RELEASE ORAL at 12:36

## 2023-10-30 NOTE — BH CONSULTATION LIAISON ASSESSMENT NOTE - NSBHCHARTREVIEWINVESTIGATE_PSY_A_CORE FT
Ventricular Rate 74 BPM    Atrial Rate 74 BPM    P-R Interval 134 ms    QRS Duration 62 ms    Q-T Interval 400 ms    QTC Calculation(Bazett) 444 ms    P Axis 11 degrees    R Axis -29 degrees    T Axis 40 degrees    Diagnosis Line NORMAL SINUS RHYTHM  MINIMAL VOLTAGE CRITERIA FOR LVH, MAY BE NORMAL VARIANT ( R in aVL )  CANNOT RULE OUT ANTERIOR INFARCT , AGE UNDETERMINED  ABNORMAL ECG  WHEN COMPARED WITH ECG OF 15-OCT-2020 12:38,  SIGNIFICANT CHANGES HAVE OCCURRED  Confirmed by MD Meek, [facility] ([id number]) on 10/28/2023 10:54:02 PM

## 2023-10-30 NOTE — BH CONSULTATION LIAISON ASSESSMENT NOTE - SUMMARY
74 y/o , domiciled female with extensive PMHx, PPhx of auditory and visual hallucinations, anxiety, who is admitted for respiratory support, wound care of tracheostomy and PEG, and management of presumed sacral osteomyelitis. Psych consulted for evaluation of auditory and visual hallucinations. Daughter noted patient was recently experiencing auditory and visual hallucinations (seeing animals that were not there & hearing a "bomb" going off outside her home), though patient not actively hallucinating on today's evaluation. Pt was prescribed Seroquel 12.5mg for sleep and Lexapro 10mg in the AM for anxiety from Psychiatrist at [facility] in March, no history of past psych admissions. Pt continues to take said meds currently as prescribed. On evaluation, pt is visibly uncomfortable and complaining about neck tightness from the trach. Pt is AO x2- states she was in the hospital but the wrong hospital, wrong date (said it was October of 2021). When questioned about any auditory, visual, or sensory hallucinations, pt denies all. Admits to some symptoms of depression in the past, daughter endorses mother is likely still depressed and feels like a burden to the family, has made passive comments about her family being "better off not having to take care of her." Pt states she has been sleeping well, although daughter endorses that pt suffers from anxiety, especially in the hospital as she is afraid to be left alone and needs someone by her at all times. Today, no acute psychiatric symptoms or behavioral issues.

## 2023-10-30 NOTE — BH CONSULTATION LIAISON ASSESSMENT NOTE - HPI (INCLUDE ILLNESS QUALITY, SEVERITY, DURATION, TIMING, CONTEXT, MODIFYING FACTORS, ASSOCIATED SIGNS AND SYMPTOMS)
74 y/o F with PMHx of T2DM, HTN, HLD, anemia, hypothyroidism, RA, fibromyalgia, remote cerebral aneurysm repair, acute hypercapnic respiratory failure now with tracheostomy, PEG tube, and bedbound, chronic sacral decubitus pressure ulcer, BIBEMS from home for 6 day hx of bleeding from the tracheostomy and PEG tube with associated abdominal pain. Patient admitted for respiratory support, wound care of tracheostomy and PEG, and management of presumed sacral osteomyelitis. Psych consulted for evaluation of auditory and visual hallucinations. Daughter noted patient was recently experiencing auditory and visual hallucinations (seeing animals that were not there & hearing a "bomb" going off outside her home), though patient not actively hallucinating on evaluation.    Spoke to patient and patient's  at bedside, spoke to daughter Keron on phone for collateral. Daughter reports that pt started experiencing hallucinations during her hospital stay at [facility] in Feb 2023, where she was "seeing snakes all over the bed, mice crawling up her body." Hallucinations started around the same time that pt was taking Meropenem, and d/c soon after since believed hallucinations was possible ADR to medication. After stay at [facility], pt was admitted to [facility] on 3 separate occasions, with first stay in March, where hallucinations began again. At [facility], pt saw inpt psychiatrist Dr. Kushal Jimenes, who prescribed Seroquel 12.5mg for sleep and Lexapro 10mg in the AM for anxiety. Pt continues to take said meds as prescribed. Daughter states that pt's symptoms wax and wane between morning and evening, as hallucinations heighten when evening hours approach.     On evaluation, pt is a , domiciled 74 y/o F with PPhx of auditory and visual hallucinations, anxiety, who is visibly uncomfortable and complaining about neck tightness from the trach. Pt is AO x2- states she was in the hospital but the wrong hospital, wrong date (said it was October of 2021). When questioned about any auditory, visual, or sensory hallucinations, pt denies all. Pt admits to some symptoms of depression in the past, for which daughter further elaborates that her mother is probably currently depressed as well, and has made some passive comments about her family being "better off not having to take care of her." Daughter explains that her mother feels like a burden to the family. Pt states she has been sleeping well, although daughter endorses that pt suffers from anxiety, especially in the hospital as she is afraid to be left alone and needs someone by her at all times. Today, no acute psychiatric symptoms or behavioral issues.

## 2023-10-30 NOTE — BH CONSULTATION LIAISON ASSESSMENT NOTE - DESCRIPTION
Pt is a , domiciled 72 y/o F who lives with her  and daughter. Pt has a home help aid 4-5 nights/wk who assists with ADLs, as pt is dependent for all ADLs. Pt has a positive support system consisting of her  and children.

## 2023-10-30 NOTE — PROGRESS NOTE ADULT - PROBLEM SELECTOR PLAN 3
Chronic Trach/ Vent dependant 2/2 Hypercapnic Resp Failure since 10/2022   -S/p Trach # 8 Cuffed Flex Shiley; trach change 8/18, PEG change 8/14 per records   -Continue Home vent settings: (300 TV/ RR 12/5 Peep/ Fio2 30%)  -Tolerates intermittent PSV 15/5 during day/ Vent HS  - Airway Clearance: Duoneb, Hypersal, Chest PT, PRN suctioning   - Maintain 02 sat > 92%    Tracheal Bleeding (Intermittent):   -S/p CT Angio neck: No evidence of active bleeding around tracheostomy site. No hematoma.  No collection   - Advise RN staff to use Red rubber trach catheters to avoid suction trauma   - Will consider ENT evaluation for scope If persist

## 2023-10-30 NOTE — BH CONSULTATION LIAISON ASSESSMENT NOTE - CURRENT MEDICATION
MEDICATIONS  (STANDING):  acetaminophen   IVPB .. 1000 milliGRAM(s) IV Intermittent once  albuterol/ipratropium for Nebulization 3 milliLiter(s) Nebulizer every 6 hours  artificial  tears Solution 2 Drop(s) Both EYES every 6 hours  calcium carbonate    500 mG (Tums) Chewable 1 Tablet(s) Chew every 12 hours  cefepime   IVPB 2000 milliGRAM(s) IV Intermittent every 8 hours  chlorhexidine 0.12% Liquid 15 milliLiter(s) Oral Mucosa every 12 hours  chlorhexidine 4% Liquid 1 Application(s) Topical <User Schedule>  dextrose 50% Injectable 50 milliLiter(s) IV Push once  escitalopram 10 milliGRAM(s) Oral daily  fentaNYL   Patch  25 MICROgram(s)/Hr 1 Patch Transdermal every 72 hours  gabapentin Solution 100 milliGRAM(s) Enteral Tube at bedtime  insulin lispro (ADMELOG) corrective regimen sliding scale   SubCutaneous every 6 hours  levothyroxine 100 MICROGram(s) Enteral Tube daily  multivitamin/minerals/iron Oral Solution (CENTRUM) 15 milliLiter(s) Oral daily  pantoprazole   Suspension 40 milliGRAM(s) Enteral Tube daily  polyethylene glycol 3350 17 Gram(s) Oral two times a day  predniSONE   Tablet 5 milliGRAM(s) Oral daily  QUEtiapine 12.5 milliGRAM(s) Oral at bedtime  senna Syrup 5 milliLiter(s) Oral once  simethicone 80 milliGRAM(s) Chew four times a day  sodium chloride 3%  Inhalation 4 milliLiter(s) Inhalation every 6 hours  vancomycin  IVPB      vancomycin  IVPB 1000 milliGRAM(s) IV Intermittent every 12 hours  zinc sulfate 220 milliGRAM(s) Oral daily    MEDICATIONS  (PRN):  sodium chloride 0.65% Nasal 2 Spray(s) Both Nostrils two times a day PRN Nasal Congestion

## 2023-10-30 NOTE — BH CONSULTATION LIAISON ASSESSMENT NOTE - NSBHCHARTREVIEWLAB_PSY_A_CORE FT
8.9    8.70  )-----------( 144      ( 30 Oct 2023 07:23 )             30.2   10-30    136  |  99  |  25<H>  ----------------------------<  186<H>  3.6   |  27  |  <0.30<L>    Ca    8.5      30 Oct 2023 07:23  Phos  3.0     10-30  Mg     1.7     10-30    TPro  6.5  /  Alb  2.9<L>  /  TBili  0.2  /  DBili  x   /  AST  23  /  ALT  27  /  AlkPhos  52  10-30

## 2023-10-30 NOTE — PROGRESS NOTE ADULT - SUBJECTIVE AND OBJECTIVE BOX
Patient is a 73y old  Female who presents with a chief complaint of     HPI:  10/30/2023    Patient is at baseline mental state. On [facility] vent. Afebrile, no distress noted.    PAST MEDICAL & SURGICAL HISTORY:  Diabetes      Rheumatoid arthritis      Fibromyalgia      Hypothyroid      Hypertension      H/O tracheostomy      PEG (percutaneous endoscopic gastrostomy) status          Review of Systems:   CONSTITUTIONAL: No fever, weight loss, or fatigue  EYES: No eye pain, visual disturbances, or discharge  ENMT:  No difficulty hearing, tinnitus, vertigo; No sinus or throat pain  NECK: No pain or stiffness  BREASTS: No pain, masses, or nipple discharge  RESPIRATORY: No cough, wheezing, chills or hemoptysis; No shortness of breath  CARDIOVASCULAR: No chest pain, palpitations, dizziness, or leg swelling  GASTROINTESTINAL: No abdominal or epigastric pain. No nausea, vomiting, or hematemesis; No diarrhea or constipation. No melena or hematochezia.  GENITOURINARY: No dysuria, frequency, hematuria, or incontinence  NEUROLOGICAL: Bed bound, functionally quadriplegic  SKIN: No itching, burning, rashes, or lesions   LYMPH NODES: No enlarged glands  ENDOCRINE: No heat or cold intolerance; No hair loss  MUSCULOSKELETAL: No joint pain or swelling; No muscle, back, or extremity pain  PSYCHIATRIC: No depression, anxiety, mood swings, or difficulty sleeping  HEME/LYMPH: No easy bruising, or bleeding gums  ALLERY AND IMMUNOLOGIC: No hives or eczema    Allergies    penicillin (Unknown)  heparin (Unknown)  Tagamet (Unknown)  pineapple (Unknown)  walnut (Unknown)  Pecan, Filbert, Hazelnut (Unknown)  Lyrica (Unknown)    Intolerances        Social History:     FAMILY HISTORY:      MEDICATIONS  (STANDING):  acetaminophen   IVPB .. 1000 milliGRAM(s) IV Intermittent once  acetylcysteine 10%  Inhalation 4 milliLiter(s) Inhalation once  albuterol/ipratropium for Nebulization 3 milliLiter(s) Nebulizer every 6 hours  artificial  tears Solution 2 Drop(s) Both EYES every 6 hours  aztreonam  IVPB      aztreonam  IVPB 1000 milliGRAM(s) IV Intermittent every 8 hours  calcium carbonate    500 mG (Tums) Chewable 1 Tablet(s) Chew every 12 hours  chlorhexidine 0.12% Liquid 15 milliLiter(s) Oral Mucosa every 12 hours  chlorhexidine 4% Liquid 1 Application(s) Topical <User Schedule>  dextrose 50% Injectable 50 milliLiter(s) IV Push once  escitalopram 10 milliGRAM(s) Oral daily  fentaNYL   Patch  25 MICROgram(s)/Hr 1 Patch Transdermal every 72 hours  gabapentin Solution 100 milliGRAM(s) Enteral Tube at bedtime  insulin lispro (ADMELOG) corrective regimen sliding scale   SubCutaneous every 6 hours  levothyroxine 100 MICROGram(s) Enteral Tube daily  multivitamin/minerals/iron Oral Solution (CENTRUM) 15 milliLiter(s) Oral daily  pantoprazole   Suspension 40 milliGRAM(s) Enteral Tube daily  predniSONE   Tablet 5 milliGRAM(s) Oral daily  QUEtiapine 12.5 milliGRAM(s) Oral at bedtime  simethicone 80 milliGRAM(s) Chew four times a day  sodium chloride 3%  Inhalation 4 milliLiter(s) Inhalation every 6 hours  vancomycin  IVPB 1000 milliGRAM(s) IV Intermittent every 12 hours  zinc sulfate 220 milliGRAM(s) Oral daily    MEDICATIONS  (PRN):  sodium chloride 0.65% Nasal 2 Spray(s) Both Nostrils two times a day PRN Nasal Congestion          I&O's Summary    27 Oct 2023 07:01  -  28 Oct 2023 07:00  --------------------------------------------------------  IN: 200 mL / OUT: 400 mL / NET: -200 mL    28 Oct 2023 07:01  -  28 Oct 2023 19:00  --------------------------------------------------------  IN: 710 mL / OUT: 0 mL / NET: 710 mL        PHYSICAL EXAM:  Vital Signs Last 24 Hrs  T(C): 36.6 (30 Oct 2023 04:55), Max: 37.9 (29 Oct 2023 18:08)  T(F): 97.9 (30 Oct 2023 04:55), Max: 100.3 (29 Oct 2023 18:08)  HR: 81 (30 Oct 2023 11:01) (74 - 83)  BP: 131/64 (30 Oct 2023 04:55) (131/64 - 143/70)  BP(mean): --  RR: 18 (30 Oct 2023 04:55) (18 - 18)  SpO2: 99% (30 Oct 2023 11:01) (98% - 100%)    Parameters below as of 30 Oct 2023 11:01  Patient On (Oxygen Delivery Method): ventilator            GENERAL: NAD, well-developed  HEAD:  Atraumatic, Normocephalic  EYES: EOMI, PERRLA, conjunctiva and sclera clear  NECK: Supple, No JVD. Trach in place, connected to mech vent  CHEST/LUNG: Clear to auscultation bilaterally; No wheeze  HEART: Regular rate and rhythm; No murmurs, rubs, or gallops  ABDOMEN: Soft, tender, Nondistended; Bowel sounds present PEG in place  EXTREMITIES:  2+ Peripheral Pulses, No clubbing, cyanosis, or edema  PSYCH: AAOx3  NEUROLOGY: Bed bound, functionally quadriplegic  SKIN: No rashes or lesions    LABS:                        8.5    6.07  )-----------( 117      ( 28 Oct 2023 07:19 )             28.4     10-28    135  |  100  |  16  ----------------------------<  77  3.3<L>   |  27  |  <0.30<L>    Ca    8.3<L>      28 Oct 2023 07:19  Phos  3.2     10-28  Mg     1.9     10-28    TPro  6.7  /  Alb  3.0<L>  /  TBili  0.2  /  DBili  x   /  AST  21  /  ALT  23  /  AlkPhos  47  10-28    PT/INR - ( 27 Oct 2023 14:39 )   PT: 12.3 sec;   INR: 1.12 ratio         PTT - ( 27 Oct 2023 14:39 )  PTT:30.8 sec      Urinalysis Basic - ( 28 Oct 2023 07:19 )    Color: x / Appearance: x / SG: x / pH: x  Gluc: 77 mg/dL / Ketone: x  / Bili: x / Urobili: x   Blood: x / Protein: x / Nitrite: x   Leuk Esterase: x / RBC: x / WBC x   Sq Epi: x / Non Sq Epi: x / Bacteria: x        RADIOLOGY & ADDITIONAL TESTS:    Imaging Personally Reviewed:    Consultant(s) Notes Reviewed:      Care Discussed with Consultants/Other Providers:

## 2023-10-30 NOTE — PROGRESS NOTE ADULT - ASSESSMENT
74 y/o F with PMHx of T2DM, HTN, HLD, anemia, hypothyroidism, RA, fibromyalgia, remote cerebral aneurysm repair, acute hypercapnic respiratory failure now with tracheostomy, PEG tube, and bedbound (trach change 8/18, PEG change 8/14), sacral pressure ulcer, BIBEMS from home for 6 day hx of bleeding from the tracheostomy and PEG tube with associated abdominal pain, recent history of auditory and visual hallucinations, and with chronic sacral decubitus ulcer. Exam notable for mild abdominal distention with LLQ and RLQ tenderness, tracheostomy in place with no obvious bleeding, PEG tube with scant amount of dried blood, at baseline neurologic status. Imaging showing no active bleeding around tracheostomy site, however was notable for mild thickening along PEG tube tract, sacral ulcer extending to the coccyx suggestive of possible osteomyelitis, and bibasilar patchy lung opacities with volume loss. Patient admitted for respiratory support, wound care of tracheostomy and PEG, and management of presumed sacral osteomyelitis.    10/28: Accepted from MICU overnight from ER; Chronic vent dependent from home; Stable on home vent settings. Started Empiric Abx for suspected PNA and possible sacral OM in setting of chronic sacral decubiti. Daughter c/o intermittent oozing (bleeding) from trach/peg site. CT imaging performed, no active bleeding source noted. F/u all Cultures. ID and GI consults called. Will consider ent consult on Mon if further trach bleed. Call Psych cx on Mon.

## 2023-10-30 NOTE — CONSULT NOTE ADULT - ASSESSMENT
A/P: 74 y/o F with PMHx of T2DM, HTN, HLD, anemia, hypothyroidism, RA, fibromyalgia, remote cerebral aneurysm repair, acute hypercapnic respiratory failure now with tracheostomy, PEG tube, and bedbound (trach change 8/18, PEG change 8/14), sacral pressure ulcer, BIBEMS from home for 6 day hx of bleeding from the tracheostomy and PEG tube with associated abdominal pain, recent history of auditory and visual hallucinations, and with chronic sacral decubitus ulcer. Exam notable for mild abdominal distention with LLQ and RLQ tenderness, tracheostomy in place with no obvious bleeding, PEG tube with scant amount of dried blood, at baseline neurologic status. Imaging showing no active bleeding around tracheostomy site, however was notable for mild thickening along PEG tube tract, sacral ulcer extending to the coccyx suggestive of possible osteomyelitis, and bibasilar patchy lung opacities with volume loss. Patient admitted for respiratory support, wound care of tracheostomy and PEG, and management of presumed sacral osteomyelitis.    Wound Consult requested to assist w/ management of Sacral stage 4 pressure injury      Sacral wound- Aquacel dressing QD  Trach Mngt as per RCU  PEG Mngt as per GI  BLE elevation  Abx per Medicine/ ID  Moisturize intact skin w/ SWEEN cream BID  Nutrition Consult for optimization          encourage high quality protein, ayush/ prosource, MVI & Vit C to promote wound healing  Hyperglycemia -  ADA diet and  FS w/ ISS,  Continue turning and positioning w/ offloading assistive devices as per protocol  Buttock Kanchan BID and prn soiling        Continue w/ attends under pads and Pericare w/ emerson cath maintenance as per protocol  Waffle Cushion to chair when oob to chair  Continue w/ low air loss pressure redistribution bed surface   Pt will need Group 2 mattress on hospital bed and ROHO cushion for wheel chair upon discharge home  Care as per medicine, will follow w/ you  Upon discharge f/u as outpatient at [facility] [address] [facility] [phone]  Seen w/ attng & team and D/w team   Thank you for this consult  Angela Anthony PA-C CWS [id number]  Nights/ Weekends/ Holidays please call:  General Surgery Consult pager [phone]) for emergencies  Wound PT for multilayer leg wrapping or VAC issues (x [phone])   I spent 55minutes face to face w/ this pt of which more than 50% of the time was spent counseling & coordinating care of this pt.  A/P: 72 y/o F with PMHx of T2DM, HTN, HLD, anemia, hypothyroidism, RA, fibromyalgia, remote cerebral aneurysm repair, acute hypercapnic respiratory failure now with tracheostomy, PEG tube, and bedbound (trach change 8/18, PEG change 8/14), sacral pressure ulcer, BIBEMS from home for 6 day hx of bleeding from the tracheostomy and PEG tube with associated abdominal pain, recent history of auditory and visual hallucinations, and with chronic sacral decubitus ulcer. Exam notable for mild abdominal distention with LLQ and RLQ tenderness, tracheostomy in place with no obvious bleeding, PEG tube with scant amount of dried blood, at baseline neurologic status. Imaging showing no active bleeding around tracheostomy site, however was notable for mild thickening along PEG tube tract, sacral ulcer extending to the coccyx suggestive of possible osteomyelitis, and bibasilar patchy lung opacities with volume loss. Patient admitted for respiratory support, wound care of tracheostomy and PEG, and management of presumed sacral osteomyelitis.    Wound Consult requested to assist w/ management of:  Sacral stage 4 pressure injury      Sacral wound- Aquacel dressing QD  Trach Mngt as per RCU  PEG Mngt as per GI  BLE elevation  Abx per Medicine/ ID  Moisturize intact skin w/ SWEEN cream BID  Nutrition Consult for optimization          encourage high quality protein, ayush/ prosource, MVI & Vit C to promote wound healing  Hyperglycemia -  ADA diet and  FS w/ ISS,  Continue turning and positioning w/ offloading assistive devices as per protocol  Buttock Kanchan BID and prn soiling        Continue w/ attends under pads and Pericare w/ emerson cath maintenance as per protocol  Waffle Cushion to chair when oob to chair  Continue w/ low air loss pressure redistribution bed surface   Pt will need Group 2 mattress on hospital bed and ROHO cushion for wheel chair upon discharge home  Care as per medicine, will follow w/ you  Upon discharge f/u as outpatient at Wound Center [address] [facility] [phone]  Seen w/ attng & team and D/w team   Thank you for this consult  Angela Anthony PA-C CWS [id number]  Nights/ Weekends/ Holidays please call:  General Surgery Consult pager ([phone]) for emergencies  Wound PT for multilayer leg wrapping or VAC issues (x [phone])

## 2023-10-30 NOTE — BH CONSULTATION LIAISON ASSESSMENT NOTE - NSBHCONSULTRECOMMENDOTHER_PSY_A_CORE FT
-continue with Seroquel 12.5mg for sleep  -continue with Lexapro 10mg for anxiety -continue with Seroquel 12.5mg qhs for sleep (and for AHs and delirium)  -continue with Lexapro 10mg for anxiety

## 2023-10-30 NOTE — PROGRESS NOTE ADULT - NS ATTEND AMEND GEN_ALL_CORE FT
73F w/ DM, HTN, HLD, anemia, hypothyroidism, RA, fibromyalgia, remote cerebral aneursym repair, hypercapnic respiratory failure s/p tracheostomy/PEG, bedbound, sacral pressure ulcer. Admitted w/ bleeding from tracheostomy and PEG w/ associated abdominal pain, recent hx of auditory/visual hallucinations. Since admission, no further bleeding noted from either trach or PEG. Imaging showed mild thickening along PEG tube tract and sacral ulcer extending to coccyx suggestive of possible OM.     Pt's mental status is at baseline. Family requesting psych consult since she has been hallucinating- in meantime continue lexapro and seroquel. Fentanyl patch for pain. Trach to vent, which is baseline, continue PS weaning daily; chest PT for airway clearance. Sputum growing MSSA, currently on cefepime and vancomycin, f/u w/ ID regarding abx. Report of c diff as outpt- discuss w/ ID regarding vancomycin ppx while on abx. Wound care for sacral wound. Tolerating TF, continue bowel regimen; GI evaluated pt, recommending decreasing feed rate and loosened PEG tube. No further bleeding seen from either PEG or trach, would not recommend ENT evaluation at this time, if hgb stable and no bleeding will start HSQ tomorrow. Continue levothyroxine. Continue lantus and ISS for DM. PRednisone for RA. SCDs for DVT ppx. Full code. Dispo pending clinical course

## 2023-10-30 NOTE — PROGRESS NOTE ADULT - SUBJECTIVE AND OBJECTIVE BOX
CC: F/U for PNA    Saw/spoke to patient. Unchanged. Family at bedside.    Allergies  penicillin (Unknown)  heparin (Unknown)  Tagamet (Unknown)  pineapple (Unknown)  walnut (Unknown)  Pecan, Filbert, Hazelnut (Unknown)  Lyrica (Unknown)    ANTIMICROBIALS:  cefepime   IVPB 2000 every 8 hours  vancomycin  IVPB 1000 every 12 hours  vancomycin  IVPB      PE:    Vital Signs Last 24 Hrs  T(C): 36.6 (30 Oct 2023 04:55), Max: 37.9 (29 Oct 2023 18:08)  T(F): 97.9 (30 Oct 2023 04:55), Max: 100.3 (29 Oct 2023 18:08)  HR: 81 (30 Oct 2023 14:48) (74 - 83)  BP: 143/60 (30 Oct 2023 12:04) (131/64 - 143/70)  RR: 14 (30 Oct 2023 12:04) (14 - 18)  SpO2: 99% (30 Oct 2023 14:48) (98% - 100%)    Gen: AOx3, NAD, non-toxic  CV: Nontachycardic  Resp: Breathing comfortably, RA  Abd: Soft, nontender  IV/Skin: No thrombophlebitis    LABS:                        8.9    8.70  )-----------( 144      ( 30 Oct 2023 07:23 )             30.2     10-30    136  |  99  |  25<H>  ----------------------------<  186<H>  3.6   |  27  |  <0.30<L>    Ca    8.5      30 Oct 2023 07:23  Phos  3.0     10-30  Mg     1.7     10-30    TPro  6.5  /  Alb  2.9<L>  /  TBili  0.2  /  DBili  x   /  AST  23  /  ALT  27  /  AlkPhos  52  10-30    Urinalysis Basic - ( 30 Oct 2023 07:23 )    Color: x / Appearance: x / SG: x / pH: x  Gluc: 186 mg/dL / Ketone: x  / Bili: x / Urobili: x   Blood: x / Protein: x / Nitrite: x   Leuk Esterase: x / RBC: x / WBC x   Sq Epi: x / Non Sq Epi: x / Bacteria: x    MICROBIOLOGY:    .Sputum Sputum  10-28-23   Numerous Mixed gram negative rods including  Numerous Pseudomonas aeruginosa Susceptibility to follow.  Numerous Staphylococcus aureus  Normal Respiratory Shanda present  --  Staphylococcus aureus    Clean Catch Clean Catch (Midstream)  10-27-23   No growth  --  --    .Blood Blood-Peripheral  10-27-23   No growth at 48 Hours  --  --    .Blood Blood-Peripheral  10-27-23   No growth at 48 Hours  --  --    Rapid RVP Result: NotDetec (10-28 @ 07:19)    (otherwise reviewed)    RADIOLOGY:    10/27 XR:    FINDINGS/  IMPRESSION:  Tracheostomy tube.  The heart is normal in size.  Low lung volumes. Bibasilar patchy opacities, consider subsegmental   atelectasis versus infection/pneumonia in proper clinical context.  There is no pneumothorax or pleural effusion.  Generalized osteopenia. No acute bony abnormality.

## 2023-10-30 NOTE — BH CONSULTATION LIAISON ASSESSMENT NOTE - NSICDXBHSECONDARYDX_PSY_ALL_CORE
Acute respiratory failure with hypoxia   J96.01  Type 2 diabetes mellitus   E11.9  Sacral osteomyelitis   M46.28  Hallucination   R44.3

## 2023-10-30 NOTE — PROGRESS NOTE ADULT - PROBLEM SELECTOR PLAN 2
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   - S/p CT abd/pelvis (10/28): Sacral decubitus ulcer extending to the coccyx with osseous remodeling and pelvic MRI or bone scan to recc to exclude osteomyelitis.  - MRI pelvis 10/28: ordered   - Elevated, ESR 85   - Elevated,    - Maintain current empiric Abx   - Wound care evaluation pending   - ID consult placed 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   - S/p CT abd/pelvis (10/28): Sacral decubitus ulcer extending to the coccyx with osseous remodeling and pelvic MRI or bone scan to recc to exclude osteomyelitis.  - MRI pelvis 10/30 with Osteomylitis, c/w current Cefepime and Vanco for 7 days   - Elevated, ESR 85   - Elevated,    - Wound care on board  - ID consult placed 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   - S/p CT abd/pelvis (10/28): Sacral decubitus ulcer extending to the coccyx with osseous remodeling and pelvic MRI or bone scan to recc to exclude osteomyelitis.  - MRI pelvis 10/30 with Osteomylitis, c/w current Cefepime for 7 days, Vancomycin d/marli   - Elevated, ESR 85   - Elevated,    - Wound care on board  - ID consult placed

## 2023-10-30 NOTE — BH CONSULTATION LIAISON ASSESSMENT NOTE - RISK ASSESSMENT
Risk factors: chronic pain, unable to care for self due to medical illness    Protective factors: no current SI/HI, no hx SA/SIB, no prior psych admissions, no access to weapons, no active substance abuse, domiciled, intact marriage, social supports, positive therapeutic relationship, engaged in and compliant with treatment, help-seeking behaviors    Overall, pt is a low risk of harm to self/others and does not meet criteria for psychiatric admission.

## 2023-10-30 NOTE — BH CONSULTATION LIAISON ASSESSMENT NOTE - NSBHATTESTCOMMENTATTENDFT_PSY_A_CORE
Pt is a 74 y/o , woman with an extensive PMHx, PPhx of auditory and visual hallucinations, anxiety, who is admitted for respiratory support, wound care of tracheostomy and PEG, and management of presumed sacral osteomyelitis. Psychiatry was consulted for evaluation of auditory and visual hallucinations. Pt's daughter noted patient was recently experiencing auditory and visual hallucinations (seeing animals that were not there & hearing a "bomb" going off outside her home), though patient denies any hallucinations today when evaluated.  Pt was prescribed Seroquel 12.5mg for sleep and Lexapro 10mg in the AM for anxiety by a psychiatrist at [facility] in March.     On evaluation, pt is visibly uncomfortable and complaining about neck tightness from the trach. Pt is AO x2- states she was in the hospital but the wrong hospital, wrong date (said it was October of 2021). When questioned about any auditory, visual, or sensory hallucinations, pt denies all. Admits to some symptoms of depression in the past, daughter endorses mother is likely still depressed and feels like a burden to the family, has made passive comments about her family being "better off not having to take care of her." Pt states she has been sleeping well, although daughter says that pt suffers from anxiety, especially in the hospital as she is afraid to be left alone and needs someone by her at all times. Pt currently has no acute psychiatric symptoms but may benefit from continuing the Seroquel 12.5mg at bedtime to help with any further symptoms of delirium and sleep.

## 2023-10-30 NOTE — BH CONSULTATION LIAISON ASSESSMENT NOTE - NSBHCHARTREVIEWVS_PSY_A_CORE FT
Vital Signs Last 24 Hrs  T(C): 36.6 (30 Oct 2023 04:55), Max: 37.9 (29 Oct 2023 18:08)  T(F): 97.9 (30 Oct 2023 04:55), Max: 100.3 (29 Oct 2023 18:08)  HR: 74 (30 Oct 2023 06:00) (74 - 85)  BP: 131/64 (30 Oct 2023 04:55) (131/64 - 143/70)  BP(mean): --  RR: 18 (30 Oct 2023 04:55) (18 - 18)  SpO2: 100% (30 Oct 2023 06:00) (98% - 100%)    Parameters below as of 30 Oct 2023 06:00  Patient On (Oxygen Delivery Method): ventilator

## 2023-10-30 NOTE — CONSULT NOTE ADULT - SUBJECTIVE AND OBJECTIVE BOX
Wound SURGERY CONSULT NOTE    HPI:  72 y/o F with PMHx of T2DM, HTN, HLD, anemia, hypothyroidism, RA, fibromyalgia, remote cerebral aneurysm repair, acute hypercapnic respiratory failure now with tracheostomy, PEG tube, and bedbound (trach change 8/18, PEG change 8/14), sacral pressure ulcer, BIBEMS from home for 6 day hx of bleeding from the tracheostomy and PEG tube with associated abdominal pain. Patient still having regular bowel movements, last one occurred prior to ED arrival. Was seen outpatient on 10/26 by critical care Dr. Zaki Gottlieb and apparently had cultures sent at that time. Patient also noted to have chronic sacral decubitous ulcer. Family endorsed one episode of fever, though was not febrile on evaluation. Daughter noted patient was recently experiencing auditory and visual hallucinations (seeing animals that were not there & hearing a "bomb" going off outside her home), though patient not actively hallucinating on evaluation.    ED course notable for CT neck imaging showing no evidence of active bleeding around the tracheostomy site, no hematomas, and no drainable collection around the tracheostomy site. CT abdomen/pelvis notable for gastrostomy tube localized to the stomach with mild thickening noted along the tract; a sacral decubitus ulcer extending to the coccyx with osseous remodeling, possibly representing osteomyelitis; and bibasilar patchy opacities with volume loss in the lungs, representing atelectasis vs infection. Patient admitted for respiratory support, wound care of tracheostomy and PEG, and management of presumed sacral osteomyelitis.   (28 Oct 2023 04:18)        N/V/D,  BM/ Flatus,   NGT,     palp/ sob/dyspnea/ cp,       F/C/S  Wound consult requested by team to assist w/ management of      wound/ pressure injury.   Pt (unable to)  c/o pain, drainage, odor, color change,  or worsening swelling. Offloading and pericare initiated upon admission as pt Increasingly sedentary 2/2 to illness. Pt is Incontinent of urine & stool. (+)emerson/ ostomy.   No h/o bites, scratches, falls, trauma.  Pt seen by Wound RN  [employer] Advance/  Kanchan,TRIAD/ Aquacell/ medihoney/ Allevyn foam/ dakins/ Adaptic/ DSD recommended used at home/ while awaiting consult.  Appetite good/ decreased.  weight loss.  S&S / RD consult appreciated All questions asked and answered to pt's and family's expressed understanding and satisfaction.    Current Diet: Diet, NPO with Tube Feed:   Tube Feeding Modality: Gastrostomy  Casie Black Peptide 1.5  Total Volume for 24 Hours (mL): 960  Continuous  Starting Tube Feed Rate mL per Hour: 10  Increase Tube Feed Rate by (mL): 10     Every 4 hours  Until Goal Tube Feed Rate (mL per Hour): 40  Tube Feed Duration (in Hours): 24  Tube Feed Start Time: 13:00  Alfred(7 Gm Arginine/7 Gm Glut/1.2 Gm HMB     Qty per Day:  3  No Carb Prosource TF     Qty per Day:  1 (10-29-23 @ 14:19)      PAST MEDICAL & SURGICAL HISTORY:  Diabetes      Rheumatoid arthritis      Fibromyalgia      Hypothyroid      Hypertension      H/O tracheostomy      PEG (percutaneous endoscopic gastrostomy) status          REVIEW OF SYSTEMS: Pt unable to offer  General/ Breast/ Skin/Vasc/ Neuro/ MSK: see HPI  All other systems negative    MEDICATIONS  (STANDING):  acetaminophen   IVPB .. 1000 milliGRAM(s) IV Intermittent once  albuterol/ipratropium for Nebulization 3 milliLiter(s) Nebulizer every 6 hours  artificial  tears Solution 2 Drop(s) Both EYES every 6 hours  calcium carbonate    500 mG (Tums) Chewable 1 Tablet(s) Chew every 12 hours  cefepime   IVPB 2000 milliGRAM(s) IV Intermittent every 8 hours  chlorhexidine 0.12% Liquid 15 milliLiter(s) Oral Mucosa every 12 hours  chlorhexidine 4% Liquid 1 Application(s) Topical <User Schedule>  dextrose 50% Injectable 50 milliLiter(s) IV Push once  escitalopram 10 milliGRAM(s) Oral daily  fentaNYL   Patch  25 MICROgram(s)/Hr 1 Patch Transdermal every 72 hours  gabapentin Solution 100 milliGRAM(s) Enteral Tube at bedtime  insulin lispro (ADMELOG) corrective regimen sliding scale   SubCutaneous every 6 hours  levothyroxine 100 MICROGram(s) Enteral Tube daily  multivitamin/minerals/iron Oral Solution (CENTRUM) 15 milliLiter(s) Oral daily  pantoprazole   Suspension 40 milliGRAM(s) Enteral Tube daily  polyethylene glycol 3350 17 Gram(s) Oral two times a day  predniSONE   Tablet 5 milliGRAM(s) Oral daily  QUEtiapine 12.5 milliGRAM(s) Oral at bedtime  senna Syrup 5 milliLiter(s) Oral once  simethicone 80 milliGRAM(s) Chew four times a day  sodium chloride 3%  Inhalation 4 milliLiter(s) Inhalation every 6 hours  zinc sulfate 220 milliGRAM(s) Oral daily    MEDICATIONS  (PRN):  sodium chloride 0.65% Nasal 2 Spray(s) Both Nostrils two times a day PRN Nasal Congestion      Allergies    penicillin (Unknown)  heparin (Unknown)  Tagamet (Unknown)  pineapple (Unknown)  walnut (Unknown)  Pecan, Filbert, Hazelnut (Unknown)  Lyrica (Unknown)    Intolerances        SOCIAL HISTORY:  / /single/ ; (+)HHA/ lives in SNF; Former smoker, No current/ Denies smoking, ETOH, drugs    FAMILY HISTORY:   no h/o PVD or wound healing or skin/ significant problems    PHYSICAL EXAM:  Vital Signs Last 24 Hrs  T(C): 36.6 (30 Oct 2023 04:55), Max: 37.9 (29 Oct 2023 18:08)  T(F): 97.9 (30 Oct 2023 04:55), Max: 100.3 (29 Oct 2023 18:08)  HR: 81 (30 Oct 2023 14:48) (74 - 83)  BP: 143/60 (30 Oct 2023 12:04) (131/64 - 143/70)  BP(mean): --  RR: 14 (30 Oct 2023 12:04) (14 - 18)  SpO2: 99% (30 Oct 2023 14:48) (99% - 100%)    Parameters below as of 30 Oct 2023 12:04  Patient On (Oxygen Delivery Method): ventilator        NAD, Guarded but stable,  A&Ox3/ Alert/ Confused  cachectic/ thin, MO/ Obese, frail,  WD/ WN/ WG,  Disheveled  Total Care Sport/ Versa Care P500 / Envella Progressa bed     HEENT:  NC/AT, PERRL, EOMI, sclera clear, mucosa moist, throat clear, trachea midline, neck supple, trach  Respiratory: nonlabored w/ equal chest rise  Gastrointestinal: soft NT/ND (+)BS  (+)PEG (+)ostomy (+)NGT  : (+)emerson/ purewick/ condom cath  Neurology:  weakened strength & sensation grossly intact, paraesthesia  nonverbal, no follow commands, paraplegic  Psych: calm/ appropriate/ flat affect/ easily agitated/ restless/ anxious/ difficult to assess  Musculoskeletal:  limited stiff / p/FROM, no deformities/ contractures  Vascular: BLE equally warm/ cool,  no cyanosis, clubbing, edema nor acute ischemia           >LE //BLE edema equal           BLE DP/PT pulses palpable          BLE hemosiderin staining/ varicose veins  Skin:  moist w/ good turgor  thin, dry, pale, frail,  ecchymosis w/o hematoma  blistering  or serosanguinous drainage  No odor, erythema, increased warmth, tenderness, induration, fluctuance, nor crepitus    LABS/ CULTURES/ RADIOLOGY:                        8.9    8.70  )-----------( 144      ( 30 Oct 2023 07:23 )             30.2       136  |  99  |  25  ----------------------------<  186      [10-30-23 @ 07:23]  3.6   |  27  |  <0.30        Ca     8.5     [10-30-23 @ 07:23]      Mg     1.7     [10-30-23 @ 07:23]      Phos  3.0     [10-30-23 @ 07:23]    TPro  6.5  /  Alb  2.9  /  TBili  0.2  /  DBili  x   /  AST  23  /  ALT  27  /  AlkPhos  52  [10-30-23 @ 07:23]      PTT: 30.0       [10-30-23 @ 07:23]        A1C with Estimated Average Glucose Result: 7.5 % (10-28-23 @ 07:18)        Culture - Blood (collected 10-27-23 @ 14:00)  Source: .Blood Blood-Peripheral  Preliminary Report (10-29-23 @ 18:02):    No growth at 48 Hours    Culture - Blood (collected 10-27-23 @ 13:55)  Source: .Blood Blood-Peripheral  Preliminary Report (10-29-23 @ 18:02):    No growth at 48 Hours                      A/P:    Wound Consult requested to assist w/ management of    BLE elevation & Compression  Consider DREA/PVR, Duplex, Xray, A/P BLE CT or MRI  Abx per Medicine/ ID  Moisturize intact skin w/ SWEEN cream BID  Nutrition Consult for optimization in pt w/ Severe Protein Calorie Malnutrition,        Inadequate PO intake, & Increased nutritional needs            encourage high quality protein, alfred/ prosource, MVI & Vit C to promote wound healing  Hyperglycemia - improving w/ ADA diet and Lantus/ NPH & FS w/ ISS, consider Endo Consult, consider HgA1c  Anemia- improving w/ transfusions, Fe studies, protonix  Continue turning and positioning w/ offloading assistive devices as per protocol  Buttocks/ Sacrum Kanchan/ TRIAD BID /[employer] ADVANCE TIW and prn soiling        Continue w/ attends under pads and Pericare w/ emerson/ condom cath maintenance / purewick care as per protocol  Waffle Cushion to chair when oob to chair  Continue w/ low air loss pressure redistribution bed surface   Pt will need Group 2 mattress on hospital bed and ROHO cushion for wheel chair upon discharge home  Care as per medicine, will follow w/ you/ remain available as requested  Upon discharge f/u as outpatient at [facility] [address] [place] [address] [phone]  Seen w/ attng & RN and D/w team & RN  Thank you for this consult  Angela Anthony PA-C CWS [id number]  Nights/ Weekends/ Holidays please call:  General Surgery Consult pager ([phone]) for emergencies  Wound PT for multilayer leg wrapping or VAC issues (x [phone])      Wound SURGERY CONSULT NOTE    HPI:  74 y/o F with PMHx of T2DM, HTN, HLD, anemia, hypothyroidism, RA, fibromyalgia, remote cerebral aneurysm repair, acute hypercapnic respiratory failure now with tracheostomy, PEG tube, and bedbound (trach change 8/18, PEG change 8/14), sacral pressure ulcer, BIBEMS from home for 6 day hx of bleeding from the tracheostomy and PEG tube with associated abdominal pain. Patient still having regular bowel movements, last one occurred prior to ED arrival. Was seen outpatient on 10/26 by critical care Dr. Zaki Gottlieb and apparently had cultures sent at that time. Patient also noted to have chronic sacral decubitous ulcer. Family endorsed one episode of fever, though was not febrile on evaluation. Daughter noted patient was recently experiencing auditory and visual hallucinations (seeing animals that were not there & hearing a "bomb" going off outside her home), though patient not actively hallucinating on evaluation.    ED course notable for CT neck imaging showing no evidence of active bleeding around the tracheostomy site, no hematomas, and no drainable collection around the tracheostomy site. CT abdomen/pelvis notable for gastrostomy tube localized to the stomach with mild thickening noted along the tract; a sacral decubitus ulcer extending to the coccyx with osseous remodeling, possibly representing osteomyelitis; and bibasilar patchy opacities with volume loss in the lungs, representing atelectasis vs infection. Patient admitted for respiratory support, wound care of tracheostomy and PEG, and management of presumed sacral osteomyelitis.   (28 Oct 2023 04:18)        N/V/D,  BM/ Flatus,   NGT,     palp/ sob/dyspnea/ cp,       F/C/S  Wound consult requested by team to assist w/ management of      wound/ pressure injury.   Pt (unable to)  c/o pain, drainage, odor, color change,  or worsening swelling. Offloading and pericare initiated upon admission as pt Increasingly sedentary 2/2 to illness. Pt is Incontinent of urine & stool. (+)emerson/ ostomy.   No h/o bites, scratches, falls, trauma.  Pt seen by Wound RN  [employer]/  Kanchan,TRIAD/ Aquacell/ medihoney/ Allevyn foam/ dakins/ Adaptic/ DSD recommended used at home/ while awaiting consult.  Appetite good/ decreased.  weight loss.  S&S / RD consult appreciated All questions asked and answered to pt's and family's expressed understanding and satisfaction.    Current Diet: Diet, NPO with Tube Feed:   Tube Feeding Modality: Gastrostomy  Casie Black Peptide 1.5  Total Volume for 24 Hours (mL): 960  Continuous  Starting Tube Feed Rate mL per Hour: 10  Increase Tube Feed Rate by (mL): 10     Every 4 hours  Until Goal Tube Feed Rate (mL per Hour): 40  Tube Feed Duration (in Hours): 24  Tube Feed Start Time: 13:00  Alfred(7 Gm Arginine/7 Gm Glut/1.2 Gm HMB     Qty per Day:  3  No Carb Prosource TF     Qty per Day:  1 (10-29-23 @ 14:19)      PAST MEDICAL & SURGICAL HISTORY:  Diabetes      Rheumatoid arthritis      Fibromyalgia      Hypothyroid      Hypertension      H/O tracheostomy      PEG (percutaneous endoscopic gastrostomy) status          REVIEW OF SYSTEMS: Pt unable to offer  General/ Breast/ Skin/Vasc/ Neuro/ MSK: see HPI  All other systems negative    MEDICATIONS  (STANDING):  acetaminophen   IVPB .. 1000 milliGRAM(s) IV Intermittent once  albuterol/ipratropium for Nebulization 3 milliLiter(s) Nebulizer every 6 hours  artificial  tears Solution 2 Drop(s) Both EYES every 6 hours  calcium carbonate    500 mG (Tums) Chewable 1 Tablet(s) Chew every 12 hours  cefepime   IVPB 2000 milliGRAM(s) IV Intermittent every 8 hours  chlorhexidine 0.12% Liquid 15 milliLiter(s) Oral Mucosa every 12 hours  chlorhexidine 4% Liquid 1 Application(s) Topical <User Schedule>  dextrose 50% Injectable 50 milliLiter(s) IV Push once  escitalopram 10 milliGRAM(s) Oral daily  fentaNYL   Patch  25 MICROgram(s)/Hr 1 Patch Transdermal every 72 hours  gabapentin Solution 100 milliGRAM(s) Enteral Tube at bedtime  insulin lispro (ADMELOG) corrective regimen sliding scale   SubCutaneous every 6 hours  levothyroxine 100 MICROGram(s) Enteral Tube daily  multivitamin/minerals/iron Oral Solution (CENTRUM) 15 milliLiter(s) Oral daily  pantoprazole   Suspension 40 milliGRAM(s) Enteral Tube daily  polyethylene glycol 3350 17 Gram(s) Oral two times a day  predniSONE   Tablet 5 milliGRAM(s) Oral daily  QUEtiapine 12.5 milliGRAM(s) Oral at bedtime  senna Syrup 5 milliLiter(s) Oral once  simethicone 80 milliGRAM(s) Chew four times a day  sodium chloride 3%  Inhalation 4 milliLiter(s) Inhalation every 6 hours  zinc sulfate 220 milliGRAM(s) Oral daily    MEDICATIONS  (PRN):  sodium chloride 0.65% Nasal 2 Spray(s) Both Nostrils two times a day PRN Nasal Congestion      Allergies  penicillin (Unknown)  heparin (Unknown)  Tagamet (Unknown)  pineapple (Unknown)  walnut (Unknown)  Pecan, Filbert, Hazelnut (Unknown)  Lyrica (Unknown)      SOCIAL HISTORY:  ; (+)HHA/ No current smoking, ETOH, drugs    FAMILY HISTORY: no h/o significant problems    PHYSICAL EXAM:  Vital Signs Last 24 Hrs  T(C): 36.6 (30 Oct 2023 04:55), Max: 37.9 (29 Oct 2023 18:08)  T(F): 97.9 (30 Oct 2023 04:55), Max: 100.3 (29 Oct 2023 18:08)  HR: 81 (30 Oct 2023 14:48) (74 - 83)  BP: 143/60 (30 Oct 2023 12:04) (131/64 - 143/70)  BP(mean): --  RR: 14 (30 Oct 2023 12:04) (14 - 18)  SpO2: 99% (30 Oct 2023 14:48) (99% - 100%)    Parameters below as of 30 Oct 2023 12:04  Patient On (Oxygen Delivery Method): ventilator        NAD, Guarded but stable,  A&Ox3/ Alert/ Confused  cachectic/ thin, MO/ Obese, frail,  WD/ WN/ WG,  Disheveled  Total Care Sport/ Versa Care P500 / Envella Progressa bed     HEENT:  NC/AT, PERRL, EOMI, sclera clear, mucosa moist, throat clear, trachea midline, neck supple, trach  Respiratory: nonlabored w/ equal chest rise  Gastrointestinal: soft NT/ND (+)BS  (+)PEG (+)ostomy (+)NGT  : (+)emerson/ purewick/ condom cath  Neurology:  weakened strength & sensation grossly intact, paraesthesia  nonverbal, no follow commands, paraplegic  Psych: calm/ appropriate/ flat affect/ easily agitated/ restless/ anxious/ difficult to assess  Musculoskeletal:  limited stiff / p/FROM, no deformities/ contractures  Vascular: BLE equally warm/ cool,  no cyanosis, clubbing, edema nor acute ischemia           >LE //BLE edema equal           BLE DP/PT pulses palpable          BLE hemosiderin staining/ varicose veins  Skin:  moist w/ good turgor  thin, dry, pale, frail,  ecchymosis w/o hematoma  blistering  or serosanguinous drainage  No odor, erythema, increased warmth, tenderness, induration, fluctuance, nor crepitus    LABS/ CULTURES/ RADIOLOGY:                        8.9    8.70  )-----------( 144      ( 30 Oct 2023 07:23 )             30.2       136  |  99  |  25  ----------------------------<  186      [10-30-23 @ 07:23]  3.6   |  27  |  <0.30        Ca     8.5     [10-30-23 @ 07:23]      Mg     1.7     [10-30-23 @ 07:23]      Phos  3.0     [10-30-23 @ 07:23]    TPro  6.5  /  Alb  2.9  /  TBili  0.2  /  DBili  x   /  AST  23  /  ALT  27  /  AlkPhos  52  [10-30-23 @ 07:23]      A1C with Estimated Average Glucose Result: 7.5 % (10-28-23 @ 07:18)    Culture - Blood (collected 10-27-23 @ 14:00)  Source: .Blood Blood-Peripheral  Preliminary Report (10-29-23 @ 18:02):    No growth at 48 Hours    Culture - Blood (collected 10-27-23 @ 13:55)  Source: .Blood Blood-Peripheral  Preliminary Report (10-29-23 @ 18:02):    No growth at 48 Hours         Wound SURGERY CONSULT NOTE    HPI:  72 y/o F with PMHx of T2DM, HTN, HLD, anemia, hypothyroidism, RA, fibromyalgia, remote cerebral aneurysm repair, acute hypercapnic respiratory failure now with tracheostomy, PEG tube, and bedbound (trach change 8/18, PEG change 8/14), sacral pressure ulcer, BIBEMS from home for 6 day hx of bleeding from the tracheostomy and PEG tube with associated abdominal pain. Patient still having regular bowel movements, last one occurred prior to ED arrival. Was seen outpatient on 10/26 by critical care Dr. Zaki Gottlieb and apparently had cultures sent at that time. Patient also noted to have chronic sacral pressure  ulcer. Family endorsed one episode of fever, though was not febrile on evaluation. Daughter noted patient was recently experiencing auditory and visual hallucinations (seeing animals that were not there & hearing a "bomb" going off outside her home), though patient not actively hallucinating on evaluation.    ED course notable for CT neck imaging showing no evidence of active bleeding around the tracheostomy site, no hematomas, and no drainable collection around the tracheostomy site. CT abdomen/pelvis notable for gastrostomy tube localized to the stomach with mild thickening noted along the tract; a sacral decubitus ulcer extending to the coccyx with osseous remodeling, possibly representing osteomyelitis; and bibasilar patchy opacities with volume loss in the lungs, representing atelectasis vs infection. Patient admitted for respiratory support, wound care of tracheostomy and PEG, and management of presumed sacral osteomyelitis.    Wound consult requested by team to assist w/ management of  saral pressure injury.  Pt being cared for at home by nursing and family.  Pt hasn't seen Wound md since leaving SNF, just [facility].  Pt had VAC tx on sacral wound.   Pt unable to c/o pn, drainage, odor, color change,  or worsening swelling and none noted by family.  Daughter noted concern about PEG & trach sites. Offloading and pericare initiated upon admission. Pt is Incontinent of stool. (+)emerson.   No h/o bites, scratches, falls, trauma. Pt tolerating TF at home w/o weight loss.  All questions asked and answered to pt's family's expressed understanding and satisfaction.    Current Diet: Diet, NPO with Tube Feed:   Tube Feeding Modality: Gastrostomy  Casie Farns Peptide 1.5  Total Volume for 24 Hours (mL): 960  Continuous  Starting Tube Feed Rate mL per Hour: 10  Increase Tube Feed Rate by (mL): 10     Every 4 hours  Until Goal Tube Feed Rate (mL per Hour): 40  Tube Feed Duration (in Hours): 24  Tube Feed Start Time: 13:00  Alfred(7 Gm Arginine/7 Gm Glut/1.2 Gm HMB     Qty per Day:  3  No Carb Prosource TF     Qty per Day:  1 (10-29-23 @ 14:19)      PAST MEDICAL & SURGICAL HISTORY:  Diabetes    Rheumatoid arthritis    Fibromyalgia    Hypothyroid    Hypertension    s/p tracheostomy    s/p PEG (percutaneous endoscopic gastrostomy) status    REVIEW OF SYSTEMS: Pt unable to offer      MEDICATIONS  (STANDING):  acetaminophen   IVPB .. 1000 milliGRAM(s) IV Intermittent once  albuterol/ipratropium for Nebulization 3 milliLiter(s) Nebulizer every 6 hours  artificial  tears Solution 2 Drop(s) Both EYES every 6 hours  calcium carbonate    500 mG (Tums) Chewable 1 Tablet(s) Chew every 12 hours  cefepime   IVPB 2000 milliGRAM(s) IV Intermittent every 8 hours  chlorhexidine 0.12% Liquid 15 milliLiter(s) Oral Mucosa every 12 hours  chlorhexidine 4% Liquid 1 Application(s) Topical <User Schedule>  dextrose 50% Injectable 50 milliLiter(s) IV Push once  escitalopram 10 milliGRAM(s) Oral daily  fentaNYL   Patch  25 MICROgram(s)/Hr 1 Patch Transdermal every 72 hours  gabapentin Solution 100 milliGRAM(s) Enteral Tube at bedtime  insulin lispro (ADMELOG) corrective regimen sliding scale   SubCutaneous every 6 hours  levothyroxine 100 MICROGram(s) Enteral Tube daily  multivitamin/minerals/iron Oral Solution (CENTRUM) 15 milliLiter(s) Oral daily  pantoprazole   Suspension 40 milliGRAM(s) Enteral Tube daily  polyethylene glycol 3350 17 Gram(s) Oral two times a day  predniSONE   Tablet 5 milliGRAM(s) Oral daily  QUEtiapine 12.5 milliGRAM(s) Oral at bedtime  senna Syrup 5 milliLiter(s) Oral once  simethicone 80 milliGRAM(s) Chew four times a day  sodium chloride 3%  Inhalation 4 milliLiter(s) Inhalation every 6 hours  zinc sulfate 220 milliGRAM(s) Oral daily    MEDICATIONS  (PRN):  sodium chloride 0.65% Nasal 2 Spray(s) Both Nostrils two times a day PRN Nasal Congestion      Allergies  penicillin (Unknown)  heparin (Unknown)  Tagamet (Unknown)  pineapple (Unknown)  walnut (Unknown)  Pecan, Filbert, Hazelnut (Unknown)  Lyrica (Unknown)      SOCIAL HISTORY:  ; (+)HHA/ No current smoking, ETOH, drugs    FAMILY HISTORY: no h/o significant problems    PHYSICAL EXAM:  Vital Signs Last 24 Hrs  T(C): 36.6 (30 Oct 2023 04:55), Max: 37.9 (29 Oct 2023 18:08)  T(F): 97.9 (30 Oct 2023 04:55), Max: 100.3 (29 Oct 2023 18:08)  HR: 81 (30 Oct 2023 14:48) (74 - 83)  BP: 143/60 (30 Oct 2023 12:04) (131/64 - 143/70)  BP(mean): --  RR: 14 (30 Oct 2023 12:04) (14 - 18)  SpO2: 99% (30 Oct 2023 14:48) (99% - 100%)    Parameters below as of 30 Oct 2023 12:04  Patient On (Oxygen Delivery Method): ventilator      NAD,  Alert, frail,  WD/ WN/ WG  Total Care Sport bed  HEENT:  NC/AT, EOMI, sclera clear, mucosa moist, throat clear, trach       w/o secretions or peritrach skin irration  Respiratory: nonlabored w/ equal chest rise  Gastrointestinal: soft NT/ND (+)PEG w/o drainage or skin irration  : (+)emerson condom cath  Neurology:  nonverbal, no follow commands, paraplegic  Psych: calm/ appropriate  Musculoskeletal:  pROM, no deformities/ contractures  Vascular: BLE equally warm,  no cyanosis, clubbing nor acute ischemia  Skin:  moist w/ good turgor  Sacral stage 4pressure injury  moist granular wound bed   palpable but not exposed bone  no blistering   (+) serosanguinous drainage  No odor, erythema, increased warmth, tenderness, induration, fluctuance, nor crepitus    LABS/ CULTURES/ RADIOLOGY:                        8.9    8.70  )-----------( 144      ( 30 Oct 2023 07:23 )             30.2       136  |  99  |  25  ----------------------------<  186      [10-30-23 @ 07:23]  3.6   |  27  |  <0.30        Ca     8.5     [10-30-23 @ 07:23]      Mg     1.7     [10-30-23 @ 07:23]      Phos  3.0     [10-30-23 @ 07:23]    TPro  6.5  /  Alb  2.9  /  TBili  0.2  /  DBili  x   /  AST  23  /  ALT  27  /  AlkPhos  52  [10-30-23 @ 07:23]      A1C with Estimated Average Glucose Result: 7.5 % (10-28-23 @ 07:18)    Culture - Blood (collected 10-27-23 @ 14:00)  Source: .Blood Blood-Peripheral  Preliminary Report (10-29-23 @ 18:02):    No growth at 48 Hours    Culture - Blood (collected 10-27-23 @ 13:55)  Source: .Blood Blood-Peripheral  Preliminary Report (10-29-23 @ 18:02):    No growth at 48 Hours         Wound SURGERY CONSULT NOTE    HPI:  72 y/o F with PMHx of T2DM, HTN, HLD, anemia, hypothyroidism, RA, fibromyalgia, remote cerebral aneurysm repair, acute hypercapnic respiratory failure now with tracheostomy, PEG tube, and bedbound (trach change 8/18, PEG change 8/14), sacral pressure ulcer, BIBEMS from home for 6 day hx of bleeding from the tracheostomy and PEG tube with associated abdominal pain. Patient still having regular bowel movements, last one occurred prior to ED arrival. Was seen outpatient on 10/26 by critical care Dr. Zaki Gottlieb and apparently had cultures sent at that time. Patient also noted to have chronic sacral pressure  ulcer. Family endorsed one episode of fever, though was not febrile on evaluation. Daughter noted patient was recently experiencing auditory and visual hallucinations (seeing animals that were not there & hearing a "bomb" going off outside her home), though patient not actively hallucinating on evaluation.    ED course notable for CT neck imaging showing no evidence of active bleeding around the tracheostomy site, no hematomas, and no drainable collection around the tracheostomy site. CT abdomen/pelvis notable for gastrostomy tube localized to the stomach with mild thickening noted along the tract; a sacral decubitus ulcer extending to the coccyx with osseous remodeling, possibly representing osteomyelitis; and bibasilar patchy opacities with volume loss in the lungs, representing atelectasis vs infection. Patient admitted for respiratory support, wound care of tracheostomy and PEG, and management of presumed sacral osteomyelitis.    Wound consult requested by team to assist w/ management of  saral pressure injury.  Pt being cared for at home by nursing and family.  Pt hasn't seen Wound md since leaving SNF, just [facility].  Pt had VAC tx on sacral wound.   Pt unable to c/o pn, drainage, odor, color change,  or worsening swelling and none noted by family.  Daughter noted concern about PEG & trach sites. Offloading and pericare initiated upon admission. Pt is Incontinent of stool. (+)emerson.   No h/o bites, scratches, falls, trauma. Pt tolerating TF at home w/o weight loss.  All questions asked and answered to pt's family's expressed understanding and satisfaction.    Current Diet: Diet, NPO with Tube Feed:   Tube Feeding Modality: Gastrostomy  Casie Farns Peptide 1.5  Total Volume for 24 Hours (mL): 960  Continuous  Starting Tube Feed Rate mL per Hour: 10  Increase Tube Feed Rate by (mL): 10     Every 4 hours  Until Goal Tube Feed Rate (mL per Hour): 40  Tube Feed Duration (in Hours): 24  Tube Feed Start Time: 13:00  Alfred(7 Gm Arginine/7 Gm Glut/1.2 Gm HMB     Qty per Day:  3  No Carb Prosource TF     Qty per Day:  1 (10-29-23 @ 14:19)      PAST MEDICAL & SURGICAL HISTORY:  Diabetes    Rheumatoid arthritis    Fibromyalgia    Hypothyroid    Hypertension    s/p tracheostomy    s/p PEG (percutaneous endoscopic gastrostomy) status    REVIEW OF SYSTEMS: Pt unable to offer      MEDICATIONS  (STANDING):  acetaminophen   IVPB .. 1000 milliGRAM(s) IV Intermittent once  albuterol/ipratropium for Nebulization 3 milliLiter(s) Nebulizer every 6 hours  artificial  tears Solution 2 Drop(s) Both EYES every 6 hours  calcium carbonate    500 mG (Tums) Chewable 1 Tablet(s) Chew every 12 hours  cefepime   IVPB 2000 milliGRAM(s) IV Intermittent every 8 hours  chlorhexidine 0.12% Liquid 15 milliLiter(s) Oral Mucosa every 12 hours  chlorhexidine 4% Liquid 1 Application(s) Topical <User Schedule>  dextrose 50% Injectable 50 milliLiter(s) IV Push once  escitalopram 10 milliGRAM(s) Oral daily  fentaNYL   Patch  25 MICROgram(s)/Hr 1 Patch Transdermal every 72 hours  gabapentin Solution 100 milliGRAM(s) Enteral Tube at bedtime  insulin lispro (ADMELOG) corrective regimen sliding scale   SubCutaneous every 6 hours  levothyroxine 100 MICROGram(s) Enteral Tube daily  multivitamin/minerals/iron Oral Solution (CENTRUM) 15 milliLiter(s) Oral daily  pantoprazole   Suspension 40 milliGRAM(s) Enteral Tube daily  polyethylene glycol 3350 17 Gram(s) Oral two times a day  predniSONE   Tablet 5 milliGRAM(s) Oral daily  QUEtiapine 12.5 milliGRAM(s) Oral at bedtime  senna Syrup 5 milliLiter(s) Oral once  simethicone 80 milliGRAM(s) Chew four times a day  sodium chloride 3%  Inhalation 4 milliLiter(s) Inhalation every 6 hours  zinc sulfate 220 milliGRAM(s) Oral daily    MEDICATIONS  (PRN):  sodium chloride 0.65% Nasal 2 Spray(s) Both Nostrils two times a day PRN Nasal Congestion      Allergies  penicillin (Unknown)  heparin (Unknown)  Tagamet (Unknown)  pineapple (Unknown)  walnut (Unknown)  Pecan, Filbert, Hazelnut (Unknown)  Lyrica (Unknown)      SOCIAL HISTORY:  ; (+)HHA/ No current smoking, ETOH, drugs    FAMILY HISTORY: no h/o significant problems    PHYSICAL EXAM:  Vital Signs Last 24 Hrs  T(C): 36.6 (30 Oct 2023 04:55), Max: 37.9 (29 Oct 2023 18:08)  T(F): 97.9 (30 Oct 2023 04:55), Max: 100.3 (29 Oct 2023 18:08)  HR: 81 (30 Oct 2023 14:48) (74 - 83)  BP: 143/60 (30 Oct 2023 12:04) (131/64 - 143/70)  BP(mean): --  RR: 14 (30 Oct 2023 12:04) (14 - 18)  SpO2: 99% (30 Oct 2023 14:48) (99% - 100%)    Parameters below as of 30 Oct 2023 12:04  Patient On (Oxygen Delivery Method): ventilator      NAD,  Alert, frail,  WD/ WN/ WG  Total Care Sport bed  HEENT:  NC/AT, EOMI, sclera clear, mucosa moist, throat clear, trach       w/o secretions or peritrach skin irration  Respiratory: nonlabored w/ equal chest rise  Gastrointestinal: soft NT/ND (+)PEG w/o drainage or skin irration  : (+)emerson condom cath  Neurology:  nonverbal, no follow commands, paraplegic  Psych: calm/ appropriate  Musculoskeletal:  pROM, no deformities/ contractures  Vascular: BLE equally warm,  no cyanosis, clubbing nor acute ischemia  Skin:  moist w/ good turgor  Sacral stage 4pressure injury  4.5cm x 2.5cm x 1.5cm w/ lip of undermining circumferentially      greatest 9-12 of 3cm   moist granular wound bed   palpable but not exposed bone  no blistering   (+) serosanguinous drainage  No odor, erythema, increased warmth, tenderness, induration, fluctuance, nor crepitus    LABS/ CULTURES/ RADIOLOGY:                        8.9    8.70  )-----------( 144      ( 30 Oct 2023 07:23 )             30.2       136  |  99  |  25  ----------------------------<  186      [10-30-23 @ 07:23]  3.6   |  27  |  <0.30        Ca     8.5     [10-30-23 @ 07:23]      Mg     1.7     [10-30-23 @ 07:23]      Phos  3.0     [10-30-23 @ 07:23]    TPro  6.5  /  Alb  2.9  /  TBili  0.2  /  DBili  x   /  AST  23  /  ALT  27  /  AlkPhos  52  [10-30-23 @ 07:23]      A1C with Estimated Average Glucose Result: 7.5 % (10-28-23 @ 07:18)    Culture - Blood (collected 10-27-23 @ 14:00)  Source: .Blood Blood-Peripheral  Preliminary Report (10-29-23 @ 18:02):    No growth at 48 Hours    Culture - Blood (collected 10-27-23 @ 13:55)  Source: .Blood Blood-Peripheral  Preliminary Report (10-29-23 @ 18:02):    No growth at 48 Hours

## 2023-10-30 NOTE — PROGRESS NOTE ADULT - ASSESSMENT
73 F PMH T2DM (A1c 7.5), HTN, HLD, anemia, hypothyroidism, RA, fibromyalgia, remote cerebral aneurysm repair, respiratory failure s/p Trach, s/p PEG, bedbound (trach change 8/18, PEG change 8/14), sacral wound, brought in for bleeding from trach and PEG site  Bleeding from trach  No fever, no leukocytosis  RVP neg  CT with PEG, sacral ulcer with osseous remodeling, patchy consolidative opacities  CXR with pneumonia  Sputum culture MSSA, pseudomonas  Treat for pneumonia  Overall, PNA/tracheitis, bleeding from trach, sacral wound  - Cefepime 2g q 8, anticipate 7 day course depending on clinical progression  - DC Vanco  - Wound care to sacral wound  - Monitor for further signs infection  - Noninfectious causes bleeding trach per team  - F/U pending cultures  - If febrile, check BCXs    Thomas Ahn MD  Contact on TEAMS messaging from 9am - 5pm  From 5pm-9am, on weekends, or if no response call [phone]

## 2023-10-30 NOTE — BH CONSULTATION LIAISON ASSESSMENT NOTE - NSBHMSEGAIT_PSY_A_CORE
You can access the FollowMyHealth Patient Portal offered by [facility] by registering at the following website: [email or web address]. By joining [facility]’s FollowMyHealth portal, you will also be able to view your health information using other applications (apps) compatible with our system.
Unable to assess

## 2023-10-31 LAB
-  AMIKACIN: SIGNIFICANT CHANGE UP
-  AMIKACIN: SIGNIFICANT CHANGE UP
-  AZTREONAM: SIGNIFICANT CHANGE UP
-  AZTREONAM: SIGNIFICANT CHANGE UP
-  CEFEPIME: SIGNIFICANT CHANGE UP
-  CEFEPIME: SIGNIFICANT CHANGE UP
-  CEFTAZIDIME: SIGNIFICANT CHANGE UP
-  CEFTAZIDIME: SIGNIFICANT CHANGE UP
-  CIPROFLOXACIN: SIGNIFICANT CHANGE UP
-  CIPROFLOXACIN: SIGNIFICANT CHANGE UP
-  GENTAMICIN: SIGNIFICANT CHANGE UP
-  GENTAMICIN: SIGNIFICANT CHANGE UP
-  IMIPENEM: SIGNIFICANT CHANGE UP
-  IMIPENEM: SIGNIFICANT CHANGE UP
-  LEVOFLOXACIN: SIGNIFICANT CHANGE UP
-  LEVOFLOXACIN: SIGNIFICANT CHANGE UP
-  MEROPENEM: SIGNIFICANT CHANGE UP
-  MEROPENEM: SIGNIFICANT CHANGE UP
-  PIPERACILLIN/TAZOBACTAM: SIGNIFICANT CHANGE UP
-  PIPERACILLIN/TAZOBACTAM: SIGNIFICANT CHANGE UP
-  TOBRAMYCIN: SIGNIFICANT CHANGE UP
-  TOBRAMYCIN: SIGNIFICANT CHANGE UP
ALBUMIN SERPL ELPH-MCNC: 3.1 G/DL — LOW (ref 3.3–5)
ALBUMIN SERPL ELPH-MCNC: 3.1 G/DL — LOW (ref 3.3–5)
ALP SERPL-CCNC: 57 U/L — SIGNIFICANT CHANGE UP (ref 40–120)
ALP SERPL-CCNC: 57 U/L — SIGNIFICANT CHANGE UP (ref 40–120)
ALT FLD-CCNC: 28 U/L — SIGNIFICANT CHANGE UP (ref 10–45)
ALT FLD-CCNC: 28 U/L — SIGNIFICANT CHANGE UP (ref 10–45)
ANION GAP SERPL CALC-SCNC: 10 MMOL/L — SIGNIFICANT CHANGE UP (ref 5–17)
ANION GAP SERPL CALC-SCNC: 10 MMOL/L — SIGNIFICANT CHANGE UP (ref 5–17)
AST SERPL-CCNC: 23 U/L — SIGNIFICANT CHANGE UP (ref 10–40)
AST SERPL-CCNC: 23 U/L — SIGNIFICANT CHANGE UP (ref 10–40)
BILIRUB SERPL-MCNC: 0.2 MG/DL — SIGNIFICANT CHANGE UP (ref 0.2–1.2)
BILIRUB SERPL-MCNC: 0.2 MG/DL — SIGNIFICANT CHANGE UP (ref 0.2–1.2)
BUN SERPL-MCNC: 19 MG/DL — SIGNIFICANT CHANGE UP (ref 7–23)
BUN SERPL-MCNC: 19 MG/DL — SIGNIFICANT CHANGE UP (ref 7–23)
CALCIUM SERPL-MCNC: 9.1 MG/DL — SIGNIFICANT CHANGE UP (ref 8.4–10.5)
CALCIUM SERPL-MCNC: 9.1 MG/DL — SIGNIFICANT CHANGE UP (ref 8.4–10.5)
CHLORIDE SERPL-SCNC: 99 MMOL/L — SIGNIFICANT CHANGE UP (ref 96–108)
CHLORIDE SERPL-SCNC: 99 MMOL/L — SIGNIFICANT CHANGE UP (ref 96–108)
CO2 SERPL-SCNC: 26 MMOL/L — SIGNIFICANT CHANGE UP (ref 22–31)
CO2 SERPL-SCNC: 26 MMOL/L — SIGNIFICANT CHANGE UP (ref 22–31)
CREAT SERPL-MCNC: <0.3 MG/DL — LOW (ref 0.5–1.3)
CREAT SERPL-MCNC: <0.3 MG/DL — LOW (ref 0.5–1.3)
CULTURE RESULTS: ABNORMAL
CULTURE RESULTS: ABNORMAL
EGFR: 113 ML/MIN/1.73M2 — SIGNIFICANT CHANGE UP
EGFR: 113 ML/MIN/1.73M2 — SIGNIFICANT CHANGE UP
GLUCOSE BLDC GLUCOMTR-MCNC: 145 MG/DL — HIGH (ref 70–99)
GLUCOSE BLDC GLUCOMTR-MCNC: 145 MG/DL — HIGH (ref 70–99)
GLUCOSE BLDC GLUCOMTR-MCNC: 201 MG/DL — HIGH (ref 70–99)
GLUCOSE BLDC GLUCOMTR-MCNC: 201 MG/DL — HIGH (ref 70–99)
GLUCOSE BLDC GLUCOMTR-MCNC: 207 MG/DL — HIGH (ref 70–99)
GLUCOSE BLDC GLUCOMTR-MCNC: 207 MG/DL — HIGH (ref 70–99)
GLUCOSE BLDC GLUCOMTR-MCNC: 315 MG/DL — HIGH (ref 70–99)
GLUCOSE BLDC GLUCOMTR-MCNC: 315 MG/DL — HIGH (ref 70–99)
GLUCOSE SERPL-MCNC: 198 MG/DL — HIGH (ref 70–99)
GLUCOSE SERPL-MCNC: 198 MG/DL — HIGH (ref 70–99)
HCT VFR BLD CALC: 30.6 % — LOW (ref 34.5–45)
HCT VFR BLD CALC: 30.6 % — LOW (ref 34.5–45)
HGB BLD-MCNC: 9.2 G/DL — LOW (ref 11.5–15.5)
HGB BLD-MCNC: 9.2 G/DL — LOW (ref 11.5–15.5)
MAGNESIUM SERPL-MCNC: 2.1 MG/DL — SIGNIFICANT CHANGE UP (ref 1.6–2.6)
MAGNESIUM SERPL-MCNC: 2.1 MG/DL — SIGNIFICANT CHANGE UP (ref 1.6–2.6)
MCHC RBC-ENTMCNC: 28.1 PG — SIGNIFICANT CHANGE UP (ref 27–34)
MCHC RBC-ENTMCNC: 28.1 PG — SIGNIFICANT CHANGE UP (ref 27–34)
MCHC RBC-ENTMCNC: 30.1 GM/DL — LOW (ref 32–36)
MCHC RBC-ENTMCNC: 30.1 GM/DL — LOW (ref 32–36)
MCV RBC AUTO: 93.6 FL — SIGNIFICANT CHANGE UP (ref 80–100)
MCV RBC AUTO: 93.6 FL — SIGNIFICANT CHANGE UP (ref 80–100)
METHOD TYPE: SIGNIFICANT CHANGE UP
METHOD TYPE: SIGNIFICANT CHANGE UP
MRSA PCR RESULT.: SIGNIFICANT CHANGE UP
MRSA PCR RESULT.: SIGNIFICANT CHANGE UP
NRBC # BLD: 0 /100 WBCS — SIGNIFICANT CHANGE UP (ref 0–0)
NRBC # BLD: 0 /100 WBCS — SIGNIFICANT CHANGE UP (ref 0–0)
ORGANISM # SPEC MICROSCOPIC CNT: ABNORMAL
PHOSPHATE SERPL-MCNC: 3.7 MG/DL — SIGNIFICANT CHANGE UP (ref 2.5–4.5)
PHOSPHATE SERPL-MCNC: 3.7 MG/DL — SIGNIFICANT CHANGE UP (ref 2.5–4.5)
PLATELET # BLD AUTO: 132 K/UL — LOW (ref 150–400)
PLATELET # BLD AUTO: 132 K/UL — LOW (ref 150–400)
POTASSIUM SERPL-MCNC: 3.8 MMOL/L — SIGNIFICANT CHANGE UP (ref 3.5–5.3)
POTASSIUM SERPL-MCNC: 3.8 MMOL/L — SIGNIFICANT CHANGE UP (ref 3.5–5.3)
POTASSIUM SERPL-SCNC: 3.8 MMOL/L — SIGNIFICANT CHANGE UP (ref 3.5–5.3)
POTASSIUM SERPL-SCNC: 3.8 MMOL/L — SIGNIFICANT CHANGE UP (ref 3.5–5.3)
PROT SERPL-MCNC: 6.9 G/DL — SIGNIFICANT CHANGE UP (ref 6–8.3)
PROT SERPL-MCNC: 6.9 G/DL — SIGNIFICANT CHANGE UP (ref 6–8.3)
RBC # BLD: 3.27 M/UL — LOW (ref 3.8–5.2)
RBC # BLD: 3.27 M/UL — LOW (ref 3.8–5.2)
RBC # FLD: 18.4 % — HIGH (ref 10.3–14.5)
RBC # FLD: 18.4 % — HIGH (ref 10.3–14.5)
S AUREUS DNA NOSE QL NAA+PROBE: DETECTED
S AUREUS DNA NOSE QL NAA+PROBE: DETECTED
SODIUM SERPL-SCNC: 135 MMOL/L — SIGNIFICANT CHANGE UP (ref 135–145)
SODIUM SERPL-SCNC: 135 MMOL/L — SIGNIFICANT CHANGE UP (ref 135–145)
SPECIMEN SOURCE: SIGNIFICANT CHANGE UP
SPECIMEN SOURCE: SIGNIFICANT CHANGE UP
WBC # BLD: 8.44 K/UL — SIGNIFICANT CHANGE UP (ref 3.8–10.5)
WBC # BLD: 8.44 K/UL — SIGNIFICANT CHANGE UP (ref 3.8–10.5)
WBC # FLD AUTO: 8.44 K/UL — SIGNIFICANT CHANGE UP (ref 3.8–10.5)
WBC # FLD AUTO: 8.44 K/UL — SIGNIFICANT CHANGE UP (ref 3.8–10.5)

## 2023-10-31 PROCEDURE — 99233 SBSQ HOSP IP/OBS HIGH 50: CPT

## 2023-10-31 PROCEDURE — 99232 SBSQ HOSP IP/OBS MODERATE 35: CPT

## 2023-10-31 RX ORDER — CEPHALEXIN 500 MG
500 CAPSULE ORAL EVERY 6 HOURS
Refills: 0 | Status: DISCONTINUED | OUTPATIENT
Start: 2023-11-06 | End: 2023-11-08

## 2023-10-31 RX ORDER — CEFEPIME 1 G/1
2000 INJECTION, POWDER, FOR SOLUTION INTRAMUSCULAR; INTRAVENOUS EVERY 8 HOURS
Refills: 0 | Status: COMPLETED | OUTPATIENT
Start: 2023-10-31 | End: 2023-11-05

## 2023-10-31 RX ORDER — LACTOBACILLUS ACIDOPHILUS 100MM CELL
1 CAPSULE ORAL DAILY
Refills: 0 | Status: DISCONTINUED | OUTPATIENT
Start: 2023-10-31 | End: 2023-11-17

## 2023-10-31 RX ORDER — ENOXAPARIN SODIUM 100 MG/ML
40 INJECTION SUBCUTANEOUS EVERY 24 HOURS
Refills: 0 | Status: DISCONTINUED | OUTPATIENT
Start: 2023-10-31 | End: 2023-11-21

## 2023-10-31 RX ORDER — CIPROFLOXACIN LACTATE 400MG/40ML
500 VIAL (ML) INTRAVENOUS EVERY 12 HOURS
Refills: 0 | Status: DISCONTINUED | OUTPATIENT
Start: 2023-11-06 | End: 2023-11-08

## 2023-10-31 RX ADMIN — Medication 2 DROP(S): at 12:29

## 2023-10-31 RX ADMIN — ZINC SULFATE TAB 220 MG (50 MG ZINC EQUIVALENT) 220 MILLIGRAM(S): 220 (50 ZN) TAB at 12:36

## 2023-10-31 RX ADMIN — Medication 3 MILLILITER(S): at 17:01

## 2023-10-31 RX ADMIN — Medication 100 MICROGRAM(S): at 04:03

## 2023-10-31 RX ADMIN — Medication 1 TABLET(S): at 17:01

## 2023-10-31 RX ADMIN — Medication 650 MILLIGRAM(S): at 17:31

## 2023-10-31 RX ADMIN — Medication 4 MILLILITER(S): at 23:14

## 2023-10-31 RX ADMIN — Medication 5 MILLIGRAM(S): at 12:33

## 2023-10-31 RX ADMIN — SIMETHICONE 80 MILLIGRAM(S): 80 TABLET, CHEWABLE ORAL at 22:41

## 2023-10-31 RX ADMIN — Medication 5 MILLIGRAM(S): at 04:03

## 2023-10-31 RX ADMIN — Medication 4: at 23:55

## 2023-10-31 RX ADMIN — QUETIAPINE FUMARATE 12.5 MILLIGRAM(S): 200 TABLET, FILM COATED ORAL at 22:37

## 2023-10-31 RX ADMIN — PANTOPRAZOLE SODIUM 40 MILLIGRAM(S): 20 TABLET, DELAYED RELEASE ORAL at 12:36

## 2023-10-31 RX ADMIN — SIMETHICONE 80 MILLIGRAM(S): 80 TABLET, CHEWABLE ORAL at 17:01

## 2023-10-31 RX ADMIN — CEFEPIME 100 MILLIGRAM(S): 1 INJECTION, POWDER, FOR SOLUTION INTRAMUSCULAR; INTRAVENOUS at 22:37

## 2023-10-31 RX ADMIN — Medication 4 MILLILITER(S): at 17:01

## 2023-10-31 RX ADMIN — Medication 15 MILLILITER(S): at 12:28

## 2023-10-31 RX ADMIN — SIMETHICONE 80 MILLIGRAM(S): 80 TABLET, CHEWABLE ORAL at 04:01

## 2023-10-31 RX ADMIN — Medication 650 MILLIGRAM(S): at 04:02

## 2023-10-31 RX ADMIN — Medication 8: at 12:42

## 2023-10-31 RX ADMIN — GABAPENTIN 100 MILLIGRAM(S): 400 CAPSULE ORAL at 22:37

## 2023-10-31 RX ADMIN — Medication 1 TABLET(S): at 04:02

## 2023-10-31 RX ADMIN — Medication 650 MILLIGRAM(S): at 17:01

## 2023-10-31 RX ADMIN — Medication 650 MILLIGRAM(S): at 13:50

## 2023-10-31 RX ADMIN — Medication 500 MILLIGRAM(S): at 12:33

## 2023-10-31 RX ADMIN — Medication 2 DROP(S): at 04:04

## 2023-10-31 RX ADMIN — Medication 3 MILLILITER(S): at 23:14

## 2023-10-31 RX ADMIN — CHLORHEXIDINE GLUCONATE 15 MILLILITER(S): 213 SOLUTION TOPICAL at 04:03

## 2023-10-31 RX ADMIN — CEFEPIME 100 MILLIGRAM(S): 1 INJECTION, POWDER, FOR SOLUTION INTRAMUSCULAR; INTRAVENOUS at 12:28

## 2023-10-31 RX ADMIN — POLYETHYLENE GLYCOL 3350 17 GRAM(S): 17 POWDER, FOR SOLUTION ORAL at 04:04

## 2023-10-31 RX ADMIN — Medication 3 MILLILITER(S): at 06:41

## 2023-10-31 RX ADMIN — Medication 4 MILLILITER(S): at 06:41

## 2023-10-31 RX ADMIN — CHLORHEXIDINE GLUCONATE 1 APPLICATION(S): 213 SOLUTION TOPICAL at 04:04

## 2023-10-31 RX ADMIN — Medication 2 DROP(S): at 17:02

## 2023-10-31 RX ADMIN — Medication 3 MILLILITER(S): at 11:08

## 2023-10-31 RX ADMIN — Medication 2 DROP(S): at 22:41

## 2023-10-31 RX ADMIN — SIMETHICONE 80 MILLIGRAM(S): 80 TABLET, CHEWABLE ORAL at 12:35

## 2023-10-31 RX ADMIN — CEFEPIME 100 MILLIGRAM(S): 1 INJECTION, POWDER, FOR SOLUTION INTRAMUSCULAR; INTRAVENOUS at 02:02

## 2023-10-31 RX ADMIN — Medication 4 MILLILITER(S): at 11:09

## 2023-10-31 RX ADMIN — Medication 650 MILLIGRAM(S): at 12:50

## 2023-10-31 RX ADMIN — Medication 650 MILLIGRAM(S): at 22:38

## 2023-10-31 RX ADMIN — ESCITALOPRAM OXALATE 10 MILLIGRAM(S): 10 TABLET, FILM COATED ORAL at 12:29

## 2023-10-31 RX ADMIN — CHLORHEXIDINE GLUCONATE 15 MILLILITER(S): 213 SOLUTION TOPICAL at 17:02

## 2023-10-31 NOTE — PROGRESS NOTE ADULT - ASSESSMENT
73 F PMH T2DM (A1c 7.5), HTN, HLD, anemia, hypothyroidism, RA, fibromyalgia, remote cerebral aneurysm repair, respiratory failure s/p Trach, s/p PEG, bedbound (trach change 8/18, PEG change 8/14), sacral wound, brought in for bleeding from trach and PEG site  Bleeding from trach  No fever, no leukocytosis  RVP neg  CT with PEG, sacral ulcer with osseous remodeling, patchy consolidative opacities  CXR with pneumonia  Sputum culture MSSA, pseudomonas  Treat for pneumonia  MR with evidence acute OM  Note wound care eval generally reassuring  Overall, PNA/tracheitis, bleeding from trach, sacral wound  - Cefepime 2g q 8, anticipate 7 day course depending on clinical progression  - Uncertain benefit to treatment for OM sacral wound, given longstanding wound, bed bound patient. Conversely, note that the MRI read suggestive of acute OM (as opposed to chronic). Balancing these considerations, would plan for Cefepime as above for 7 days, then change to Cipro 500mg q 12 and Keflex 500mg q 6 to complete a total 6 weeks from start of Cefepime. Resolution of wound more dependent on wound care/frequent turning (as opposed to antibiotic course alone). Would not commit to prolonged IV course given anticipated modest benefit.  - Wound care to sacral wound  - Monitor for further signs infection  - F/U pending cultures    Thomas Ahn MD  Contact on TEAMS messaging from 9am - 5pm  From 5pm-9am, on weekends, or if no response call [phone]

## 2023-10-31 NOTE — PROGRESS NOTE ADULT - PROBLEM SELECTOR PLAN 2
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   - S/p CT abd/pelvis (10/28): Sacral decubitus ulcer extending to the coccyx with osseous remodeling and pelvic MRI or bone scan to recc to exclude osteomyelitis.  - MRI pelvis 10/30 with Osteomylitis, c/w current Cefepime for 7 days, Vancomycin d/marli   - Elevated, ESR 85   - Elevated,    - Wound care on board  - ID consult placed

## 2023-10-31 NOTE — PROGRESS NOTE ADULT - ASSESSMENT
74 y/o F with PMHx of T2DM, HTN, HLD, anemia, hypothyroidism, RA, fibromyalgia, remote cerebral aneurysm repair, acute hypercapnic respiratory failure now with tracheostomy, PEG tube, and bedbound (trach change 8/18, PEG change 8/14), sacral pressure ulcer, BIBEMS from home for 6 day hx of bleeding from the tracheostomy and PEG tube with associated abdominal pain, recent history of auditory and visual hallucinations, and with chronic sacral decubitus ulcer. Exam notable for mild abdominal distention with LLQ and RLQ tenderness, tracheostomy in place with no obvious bleeding, PEG tube with scant amount of dried blood, at baseline neurologic status. Imaging showing no active bleeding around tracheostomy site, however was notable for mild thickening along PEG tube tract, sacral ulcer extending to the coccyx suggestive of possible osteomyelitis, and bibasilar patchy lung opacities with volume loss. Patient admitted for respiratory support, wound care of tracheostomy and PEG, and management of presumed sacral osteomyelitis.    10/28: Accepted from MICU overnight from ER; Chronic vent dependent from home; Stable on home vent settings. Started Empiric Abx for suspected PNA and possible sacral OM in setting of chronic sacral decubiti. Daughter c/o intermittent oozing (bleeding) from trach/peg site. CT imaging performed, no active bleeding source noted. F/u all Cultures. ID and GI consults called. Will consider ent consult on Mon if further trach bleed. Call Psych cx on Mon.  72 y/o F with PMHx of T2DM, HTN, HLD, anemia, hypothyroidism, RA, fibromyalgia, remote cerebral aneurysm repair, acute hypercapnic respiratory failure now with tracheostomy, PEG tube, and bedbound (trach change 8/18, PEG change 8/14), sacral pressure ulcer, BIBEMS from home for 6 day hx of bleeding from the tracheostomy and PEG tube with associated abdominal pain, recent history of auditory and visual hallucinations, and with chronic sacral decubitus ulcer. Exam notable for mild abdominal distention with LLQ and RLQ tenderness, tracheostomy in place with no obvious bleeding, PEG tube with scant amount of dried blood, at baseline neurologic status. Imaging showing no active bleeding around tracheostomy site, however was notable for mild thickening along PEG tube tract, sacral ulcer extending to the coccyx suggestive of possible osteomyelitis, and bibasilar patchy lung opacities with volume loss. Patient admitted for respiratory support, wound care of tracheostomy and PEG, and management of presumed sacral osteomyelitis.    10/28: Accepted from MICU overnight from ER; Chronic vent dependent from home; Stable on home vent settings. Started Empiric Abx for suspected PNA and possible sacral OM in setting of chronic sacral decubiti. Daughter c/o intermittent oozing (bleeding) from trach/peg site. CT imaging performed, no active bleeding source noted. F/u all Cultures. ID and GI consults called. Will consider ent consult on Mon if further trach bleed. Call Psych cx on Mon.     10/30: Lukasz saw the patient. Sputum cx with pseudomonas and staph, changed aztreonam to Cefepime as per ID, sent MRSA PCR, Hyperglycemia added Lantus 10units daily and increased sliding scale to moderate, pt went for MRI of the pelvis    10/31: Osteomyelitis as per MRI, ID recommending a total to 6wks of abx. Cefepime to be finished by 11/5 and then Cipro and Keflex to be added for the next 5 weeks, MRSA PCR negative d/ed Vancomycin, started on Lovenox ppx. Had lengthy conversations with daughter. She is concerned of patient's brain aneurysm and requesting a MRI of the brain and also requesting wound culture. Wound care on board.

## 2023-10-31 NOTE — PROGRESS NOTE ADULT - ASSESSMENT
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 Bleeding from gastrostomy. Site appears macerated. Multifactorial, possible the PEG has been too tight at home.  -PEG loosened at bedside  -no further bleeding  -wound care following    2. Leakage at PEG site. Pt is on rate of 60, home rate is 50  -would lower to 40 for now and observe for leakage.  -no further leaked     3. Abdominal pain. CT negative for acute pathology.  -rec miralax BID    4. Respiratory   per RCU        Advanced care planning forms were discussed. Code status including forceful chest compressions, defibrillation and intubation were discussed. The risks benefits and alternatives to pertinent gastrointestinal procedures and interventions were discussed in detail and all questions were answered. Duration: 15 Minutes.    [facility]  Andrew Morgan M.D.   [address], [place]  Office: [phone]

## 2023-10-31 NOTE — PROGRESS NOTE ADULT - SUBJECTIVE AND OBJECTIVE BOX
INTERVAL HPI/OVERNIGHT EVENTS:    no leakage noted surrounding peg   discussed with covering NP and RN, no leakage reported    MEDICATIONS  (STANDING):  acetaminophen   Oral Liquid .. 650 milliGRAM(s) Oral every 6 hours  acetylcysteine 10%  Inhalation 4 milliLiter(s) Inhalation every 6 hours  albuterol/ipratropium for Nebulization 3 milliLiter(s) Nebulizer every 6 hours  artificial  tears Solution 2 Drop(s) Both EYES every 6 hours  ascorbic acid 500 milliGRAM(s) Oral daily  calcium carbonate    500 mG (Tums) Chewable 1 Tablet(s) Chew every 12 hours  cefepime   IVPB 2000 milliGRAM(s) IV Intermittent every 8 hours  chlorhexidine 0.12% Liquid 15 milliLiter(s) Oral Mucosa every 12 hours  chlorhexidine 4% Liquid 1 Application(s) Topical <User Schedule>  dextrose 50% Injectable 50 milliLiter(s) IV Push once  escitalopram 10 milliGRAM(s) Oral daily  fentaNYL   Patch  25 MICROgram(s)/Hr 1 Patch Transdermal every 72 hours  gabapentin Solution 100 milliGRAM(s) Enteral Tube at bedtime  insulin glargine Injectable (LANTUS) 10 Unit(s) SubCutaneous every morning  insulin lispro (ADMELOG) corrective regimen sliding scale   SubCutaneous every 6 hours  levothyroxine 100 MICROGram(s) Enteral Tube daily  multivitamin/minerals/iron Oral Solution (CENTRUM) 15 milliLiter(s) Oral daily  oxybutynin 5 milliGRAM(s) Oral daily  oxycodone    5 mG/acetaminophen 325 mG 1 Tablet(s) Oral once  pantoprazole   Suspension 40 milliGRAM(s) Enteral Tube daily  polyethylene glycol 3350 17 Gram(s) Oral two times a day  predniSONE   Tablet 5 milliGRAM(s) Oral daily  QUEtiapine 12.5 milliGRAM(s) Oral at bedtime  senna Syrup 5 milliLiter(s) Oral once  simethicone 80 milliGRAM(s) Chew four times a day  zinc sulfate 220 milliGRAM(s) Oral daily    MEDICATIONS  (PRN):  sodium chloride 0.65% Nasal 2 Spray(s) Both Nostrils two times a day PRN Nasal Congestion      Allergies    penicillin (Unknown)  heparin (Unknown)  Tagamet (Unknown)  pineapple (Unknown)  walnut (Unknown)  Pecan, Filbert, Hazelnut (Unknown)  Lyrica (Unknown)    Intolerances        Review of Systems: *pt minimally verbal to nonverbal, unable to obtain ROS           Vital Signs Last 24 Hrs  T(C): 37.1 (31 Oct 2023 12:10), Max: 37.3 (30 Oct 2023 16:21)  T(F): 98.8 (31 Oct 2023 12:10), Max: 99.1 (30 Oct 2023 16:21)  HR: 89 (31 Oct 2023 12:10) (63 - 92)  BP: 114/63 (31 Oct 2023 12:10) (114/63 - 164/78)  BP(mean): --  RR: 16 (31 Oct 2023 12:10) (14 - 29)  SpO2: 99% (31 Oct 2023 12:10) (98% - 100%)    Parameters below as of 31 Oct 2023 12:10  Patient On (Oxygen Delivery Method): ventilator        PHYSICAL EXAM:    Constitutional: NAD  HEENT: EOMI, throat clear  Neck: No LAD, supple  Respiratory: CTA and P  Cardiovascular: S1 and S2, RRR, no M  Gastrointestinal: BS+, soft, NT/ND, neg HSM, +PEG c/d/i, running, no leakage noted  Extremities: No peripheral edema, neg clubbing, cyanosis  Vascular: 2+ peripheral pulses  Neurological: A/O  Psychiatric: weakened  Skin: No rashes      LABS:                        9.2    8.44  )-----------( 132      ( 31 Oct 2023 06:46 )             30.6     10-31    135  |  99  |  19  ----------------------------<  198<H>  3.8   |  26  |  <0.30<L>    Ca    9.1      31 Oct 2023 06:46  Phos  3.7     10-31  Mg     2.1     10-31    TPro  6.9  /  Alb  3.1<L>  /  TBili  0.2  /  DBili  x   /  AST  23  /  ALT  28  /  AlkPhos  57  10-31    PTT - ( 30 Oct 2023 07:23 )  PTT:30.0 sec  Urinalysis Basic - ( 31 Oct 2023 06:46 )    Color: x / Appearance: x / SG: x / pH: x  Gluc: 198 mg/dL / Ketone: x  / Bili: x / Urobili: x   Blood: x / Protein: x / Nitrite: x   Leuk Esterase: x / RBC: x / WBC x   Sq Epi: x / Non Sq Epi: x / Bacteria: x        RADIOLOGY & ADDITIONAL TESTS:

## 2023-10-31 NOTE — PROGRESS NOTE ADULT - SUBJECTIVE AND OBJECTIVE BOX
CC: F/U for Tracheitis    Saw/spoke to patient. Unchanged. No new events.    Allergies  penicillin (Unknown)  heparin (Unknown)  Tagamet (Unknown)  pineapple (Unknown)  walnut (Unknown)  Pecan, Filbert, Hazelnut (Unknown)  Lyrica (Unknown)    ANTIMICROBIALS:  cefepime   IVPB 2000 every 8 hours    PE:    Vital Signs Last 24 Hrs  T(C): 37.1 (31 Oct 2023 12:10), Max: 37.3 (30 Oct 2023 16:21)  T(F): 98.8 (31 Oct 2023 12:10), Max: 99.1 (30 Oct 2023 16:21)  HR: 89 (31 Oct 2023 12:10) (63 - 92)  BP: 114/63 (31 Oct 2023 12:10) (114/63 - 164/78)  RR: 16 (31 Oct 2023 12:10) (14 - 29)  SpO2: 99% (31 Oct 2023 12:10) (98% - 100%)    Gen: AOx3, NAD, non-toxic  CV: Nontachycardic  Resp: Breathing comfortably, trach  Abd: Soft, nontender  IV/Skin: No thrombophlebitis    LABS:                        9.2    8.44  )-----------( 132      ( 31 Oct 2023 06:46 )             30.6     10-31    135  |  99  |  19  ----------------------------<  198<H>  3.8   |  26  |  <0.30<L>    Ca    9.1      31 Oct 2023 06:46  Phos  3.7     10-31  Mg     2.1     10-31    TPro  6.9  /  Alb  3.1<L>  /  TBili  0.2  /  DBili  x   /  AST  23  /  ALT  28  /  AlkPhos  57  10-31    Urinalysis Basic - ( 31 Oct 2023 06:46 )    Color: x / Appearance: x / SG: x / pH: x  Gluc: 198 mg/dL / Ketone: x  / Bili: x / Urobili: x   Blood: x / Protein: x / Nitrite: x   Leuk Esterase: x / RBC: x / WBC x   Sq Epi: x / Non Sq Epi: x / Bacteria: x    MICROBIOLOGY:    .Sputum Sputum  10-28-23   Numerous Mixed gram negative rods including  Numerous Pseudomonas aeruginosa  Numerous Staphylococcus aureus  Normal Respiratory Shanda present  --  Pseudomonas aeruginosa  Staphylococcus aureus    Clean Catch Clean Catch (Midstream)  10-27-23   No growth  --  --    .Blood Blood-Peripheral  10-27-23   No growth at 72 Hours  --  --    .Blood Blood-Peripheral  10-27-23   No growth at 72 Hours  --  --    Rapid RVP Result: NotDetec (10-28 @ 07:19)    (otherwise reviewed)    RADIOLOGY:    10/30 MR:    FINDINGS:    SOFT TISSUES: Soft tissue wound in the intergluteal cleft. Wound extends   to the posterior cortex of the sacrum/coccyx. No associated abscess.  BONE MARROW: Increased STIR signal with loss of T1 hyperintense signal   consistent with acute osteomyelitis involving the S5 segment and first   coccygeal segment. No additional marrow signal abnormality.    MUSCLES AND TENDONS: No focal muscle edema.  SYNOVIUM/ JOINT FLUID: Small bilateral hip joint effusions.  CARTILAGE AND SUBCHONDRAL BONE: Cartilage spaces are maintained.  MISCELLANEOUS: Limited evaluation of the intrapelvic soft tissues.      IMPRESSION:  Acute osteomyelitis of the S5 segment of the sacrum and first coccygeal   segment.

## 2023-10-31 NOTE — PROGRESS NOTE ADULT - NS ATTEND AMEND GEN_ALL_CORE FT
73F w/ DM, HTN, HLD, anemia, hypothyroidism, RA, fibromyalgia, remote cerebral aneursym repair, hypercapnic respiratory failure s/p tracheostomy/PEG, bedbound, sacral pressure ulcer. Admitted w/ bleeding from tracheostomy and PEG w/ associated abdominal pain, recent hx of auditory/visual hallucinations. Since admission, no further bleeding noted from either trach or PEG. Imaging showed mild thickening along PEG tube tract and sacral ulcer extending to coccyx suggestive of possible OM.     Pt's mental status is at baseline. Psych saw pt, continue lexapro and seroquel. Fentanyl patch for pain. Trach to vent, which is baseline, continue PS weaning daily; chest PT for airway clearance. Sputum growing MSSA, currently on cefepime; noted acute OM on MRI of pelvis, likely will not favor treatment, discuss w/ ID. Wound care for sacral wound. Tolerating TF, continue bowel regimen; GI evaluated pt, recommending decreasing feed rate and loosened PEG tube. No further bleeding seen from either PEG or trach, would not recommend ENT evaluation at this time, if hgb stable and no bleeding, favor starting DVT ppx, discuss w/ family heparin allergy. Continue levothyroxine. Continue lantus and ISS for DM. Prednisone for RA. SCDs for DVT ppx. Full code. Dispo pending clinical course - will need d/c to facility as current agency cannot provide what pt needs at home

## 2023-10-31 NOTE — PROGRESS NOTE ADULT - SUBJECTIVE AND OBJECTIVE BOX
Patient is a 73y old  Female who presents with a chief complaint of     HPI:  10/31/2023    Patient is at baseline mental state. On [facility] vent. Afebrile, no distress noted.    PAST MEDICAL & SURGICAL HISTORY:  Diabetes      Rheumatoid arthritis      Fibromyalgia      Hypothyroid      Hypertension      H/O tracheostomy      PEG (percutaneous endoscopic gastrostomy) status          Review of Systems:   CONSTITUTIONAL: No fever, weight loss, or fatigue  EYES: No eye pain, visual disturbances, or discharge  ENMT:  No difficulty hearing, tinnitus, vertigo; No sinus or throat pain  NECK: No pain or stiffness  BREASTS: No pain, masses, or nipple discharge  RESPIRATORY: No cough, wheezing, chills or hemoptysis; No shortness of breath  CARDIOVASCULAR: No chest pain, palpitations, dizziness, or leg swelling  GASTROINTESTINAL: No abdominal or epigastric pain. No nausea, vomiting, or hematemesis; No diarrhea or constipation. No melena or hematochezia.  GENITOURINARY: No dysuria, frequency, hematuria, or incontinence  NEUROLOGICAL: Bed bound, functionally quadriplegic  SKIN: No itching, burning, rashes, or lesions   LYMPH NODES: No enlarged glands  ENDOCRINE: No heat or cold intolerance; No hair loss  MUSCULOSKELETAL: No joint pain or swelling; No muscle, back, or extremity pain  PSYCHIATRIC: No depression, anxiety, mood swings, or difficulty sleeping  HEME/LYMPH: No easy bruising, or bleeding gums  ALLERY AND IMMUNOLOGIC: No hives or eczema    Allergies    penicillin (Unknown)  heparin (Unknown)  Tagamet (Unknown)  pineapple (Unknown)  walnut (Unknown)  Pecan, Filbert, Hazelnut (Unknown)  Lyrica (Unknown)    Intolerances        Social History:     FAMILY HISTORY:      MEDICATIONS  (STANDING):  acetaminophen   IVPB .. 1000 milliGRAM(s) IV Intermittent once  acetylcysteine 10%  Inhalation 4 milliLiter(s) Inhalation once  albuterol/ipratropium for Nebulization 3 milliLiter(s) Nebulizer every 6 hours  artificial  tears Solution 2 Drop(s) Both EYES every 6 hours  aztreonam  IVPB      aztreonam  IVPB 1000 milliGRAM(s) IV Intermittent every 8 hours  calcium carbonate    500 mG (Tums) Chewable 1 Tablet(s) Chew every 12 hours  chlorhexidine 0.12% Liquid 15 milliLiter(s) Oral Mucosa every 12 hours  chlorhexidine 4% Liquid 1 Application(s) Topical <User Schedule>  dextrose 50% Injectable 50 milliLiter(s) IV Push once  escitalopram 10 milliGRAM(s) Oral daily  fentaNYL   Patch  25 MICROgram(s)/Hr 1 Patch Transdermal every 72 hours  gabapentin Solution 100 milliGRAM(s) Enteral Tube at bedtime  insulin lispro (ADMELOG) corrective regimen sliding scale   SubCutaneous every 6 hours  levothyroxine 100 MICROGram(s) Enteral Tube daily  multivitamin/minerals/iron Oral Solution (CENTRUM) 15 milliLiter(s) Oral daily  pantoprazole   Suspension 40 milliGRAM(s) Enteral Tube daily  predniSONE   Tablet 5 milliGRAM(s) Oral daily  QUEtiapine 12.5 milliGRAM(s) Oral at bedtime  simethicone 80 milliGRAM(s) Chew four times a day  sodium chloride 3%  Inhalation 4 milliLiter(s) Inhalation every 6 hours  vancomycin  IVPB 1000 milliGRAM(s) IV Intermittent every 12 hours  zinc sulfate 220 milliGRAM(s) Oral daily    MEDICATIONS  (PRN):  sodium chloride 0.65% Nasal 2 Spray(s) Both Nostrils two times a day PRN Nasal Congestion          I&O's Summary    27 Oct 2023 07:01  -  28 Oct 2023 07:00  --------------------------------------------------------  IN: 200 mL / OUT: 400 mL / NET: -200 mL    28 Oct 2023 07:01  -  28 Oct 2023 19:00  --------------------------------------------------------  IN: 710 mL / OUT: 0 mL / NET: 710 mL        PHYSICAL EXAM:  Vital Signs Last 24 Hrs  T(C): 37.1 (31 Oct 2023 12:10), Max: 37.3 (30 Oct 2023 16:21)  T(F): 98.8 (31 Oct 2023 12:10), Max: 99.1 (30 Oct 2023 16:21)  HR: 89 (31 Oct 2023 12:10) (63 - 92)  BP: 114/63 (31 Oct 2023 12:10) (114/63 - 164/78)  BP(mean): --  RR: 16 (31 Oct 2023 12:10) (14 - 29)  SpO2: 99% (31 Oct 2023 12:10) (98% - 100%)    Parameters below as of 31 Oct 2023 12:10  Patient On (Oxygen Delivery Method): ventilator      GENERAL: NAD, well-developed  HEAD:  Atraumatic, Normocephalic  EYES: EOMI, PERRLA, conjunctiva and sclera clear  NECK: Supple, No JVD. Trach in place, connected to mech vent  CHEST/LUNG: Clear to auscultation bilaterally; No wheeze  HEART: Regular rate and rhythm; No murmurs, rubs, or gallops  ABDOMEN: Soft, tender, Nondistended; Bowel sounds present PEG in place  EXTREMITIES:  2+ Peripheral Pulses, No clubbing, cyanosis, or edema  PSYCH: AAOx3  NEUROLOGY: Bed bound, functionally quadriplegic  SKIN: No rashes or lesions    LABS:                        8.5    6.07  )-----------( 117      ( 28 Oct 2023 07:19 )             28.4     10-28    135  |  100  |  16  ----------------------------<  77  3.3<L>   |  27  |  <0.30<L>    Ca    8.3<L>      28 Oct 2023 07:19  Phos  3.2     10-28  Mg     1.9     10-28    TPro  6.7  /  Alb  3.0<L>  /  TBili  0.2  /  DBili  x   /  AST  21  /  ALT  23  /  AlkPhos  47  10-28    PT/INR - ( 27 Oct 2023 14:39 )   PT: 12.3 sec;   INR: 1.12 ratio         PTT - ( 27 Oct 2023 14:39 )  PTT:30.8 sec      Urinalysis Basic - ( 28 Oct 2023 07:19 )    Color: x / Appearance: x / SG: x / pH: x  Gluc: 77 mg/dL / Ketone: x  / Bili: x / Urobili: x   Blood: x / Protein: x / Nitrite: x   Leuk Esterase: x / RBC: x / WBC x   Sq Epi: x / Non Sq Epi: x / Bacteria: x        RADIOLOGY & ADDITIONAL TESTS:    Imaging Personally Reviewed:    Consultant(s) Notes Reviewed:      Care Discussed with Consultants/Other Providers:

## 2023-11-01 DIAGNOSIS — L29.9 PRURITUS, UNSPECIFIED: ICD-10-CM

## 2023-11-01 DIAGNOSIS — Z93.0 TRACHEOSTOMY STATUS: ICD-10-CM

## 2023-11-01 LAB
ALBUMIN SERPL ELPH-MCNC: 2.7 G/DL — LOW (ref 3.3–5)
ALBUMIN SERPL ELPH-MCNC: 2.7 G/DL — LOW (ref 3.3–5)
ALP SERPL-CCNC: 52 U/L — SIGNIFICANT CHANGE UP (ref 40–120)
ALP SERPL-CCNC: 52 U/L — SIGNIFICANT CHANGE UP (ref 40–120)
ALT FLD-CCNC: 24 U/L — SIGNIFICANT CHANGE UP (ref 10–45)
ALT FLD-CCNC: 24 U/L — SIGNIFICANT CHANGE UP (ref 10–45)
ANION GAP SERPL CALC-SCNC: 9 MMOL/L — SIGNIFICANT CHANGE UP (ref 5–17)
ANION GAP SERPL CALC-SCNC: 9 MMOL/L — SIGNIFICANT CHANGE UP (ref 5–17)
AST SERPL-CCNC: 21 U/L — SIGNIFICANT CHANGE UP (ref 10–40)
AST SERPL-CCNC: 21 U/L — SIGNIFICANT CHANGE UP (ref 10–40)
BILIRUB SERPL-MCNC: 0.2 MG/DL — SIGNIFICANT CHANGE UP (ref 0.2–1.2)
BILIRUB SERPL-MCNC: 0.2 MG/DL — SIGNIFICANT CHANGE UP (ref 0.2–1.2)
BUN SERPL-MCNC: 28 MG/DL — HIGH (ref 7–23)
BUN SERPL-MCNC: 28 MG/DL — HIGH (ref 7–23)
CALCIUM SERPL-MCNC: 8.7 MG/DL — SIGNIFICANT CHANGE UP (ref 8.4–10.5)
CALCIUM SERPL-MCNC: 8.7 MG/DL — SIGNIFICANT CHANGE UP (ref 8.4–10.5)
CHLORIDE SERPL-SCNC: 101 MMOL/L — SIGNIFICANT CHANGE UP (ref 96–108)
CHLORIDE SERPL-SCNC: 101 MMOL/L — SIGNIFICANT CHANGE UP (ref 96–108)
CO2 SERPL-SCNC: 25 MMOL/L — SIGNIFICANT CHANGE UP (ref 22–31)
CO2 SERPL-SCNC: 25 MMOL/L — SIGNIFICANT CHANGE UP (ref 22–31)
CREAT SERPL-MCNC: <0.3 MG/DL — LOW (ref 0.5–1.3)
CREAT SERPL-MCNC: <0.3 MG/DL — LOW (ref 0.5–1.3)
CULTURE RESULTS: SIGNIFICANT CHANGE UP
EGFR: 113 ML/MIN/1.73M2 — SIGNIFICANT CHANGE UP
EGFR: 113 ML/MIN/1.73M2 — SIGNIFICANT CHANGE UP
GLUCOSE BLDC GLUCOMTR-MCNC: 194 MG/DL — HIGH (ref 70–99)
GLUCOSE BLDC GLUCOMTR-MCNC: 194 MG/DL — HIGH (ref 70–99)
GLUCOSE BLDC GLUCOMTR-MCNC: 237 MG/DL — HIGH (ref 70–99)
GLUCOSE BLDC GLUCOMTR-MCNC: 237 MG/DL — HIGH (ref 70–99)
GLUCOSE BLDC GLUCOMTR-MCNC: 294 MG/DL — HIGH (ref 70–99)
GLUCOSE BLDC GLUCOMTR-MCNC: 294 MG/DL — HIGH (ref 70–99)
GLUCOSE SERPL-MCNC: 247 MG/DL — HIGH (ref 70–99)
GLUCOSE SERPL-MCNC: 247 MG/DL — HIGH (ref 70–99)
HCT VFR BLD CALC: 29.3 % — LOW (ref 34.5–45)
HCT VFR BLD CALC: 29.3 % — LOW (ref 34.5–45)
HGB BLD-MCNC: 8.5 G/DL — LOW (ref 11.5–15.5)
HGB BLD-MCNC: 8.5 G/DL — LOW (ref 11.5–15.5)
MAGNESIUM SERPL-MCNC: 2 MG/DL — SIGNIFICANT CHANGE UP (ref 1.6–2.6)
MAGNESIUM SERPL-MCNC: 2 MG/DL — SIGNIFICANT CHANGE UP (ref 1.6–2.6)
MCHC RBC-ENTMCNC: 27.6 PG — SIGNIFICANT CHANGE UP (ref 27–34)
MCHC RBC-ENTMCNC: 27.6 PG — SIGNIFICANT CHANGE UP (ref 27–34)
MCHC RBC-ENTMCNC: 29 GM/DL — LOW (ref 32–36)
MCHC RBC-ENTMCNC: 29 GM/DL — LOW (ref 32–36)
MCV RBC AUTO: 95.1 FL — SIGNIFICANT CHANGE UP (ref 80–100)
MCV RBC AUTO: 95.1 FL — SIGNIFICANT CHANGE UP (ref 80–100)
NRBC # BLD: 0 /100 WBCS — SIGNIFICANT CHANGE UP (ref 0–0)
NRBC # BLD: 0 /100 WBCS — SIGNIFICANT CHANGE UP (ref 0–0)
PHOSPHATE SERPL-MCNC: 3.4 MG/DL — SIGNIFICANT CHANGE UP (ref 2.5–4.5)
PHOSPHATE SERPL-MCNC: 3.4 MG/DL — SIGNIFICANT CHANGE UP (ref 2.5–4.5)
PLATELET # BLD AUTO: 130 K/UL — LOW (ref 150–400)
PLATELET # BLD AUTO: 130 K/UL — LOW (ref 150–400)
POTASSIUM SERPL-MCNC: 4 MMOL/L — SIGNIFICANT CHANGE UP (ref 3.5–5.3)
POTASSIUM SERPL-MCNC: 4 MMOL/L — SIGNIFICANT CHANGE UP (ref 3.5–5.3)
POTASSIUM SERPL-SCNC: 4 MMOL/L — SIGNIFICANT CHANGE UP (ref 3.5–5.3)
POTASSIUM SERPL-SCNC: 4 MMOL/L — SIGNIFICANT CHANGE UP (ref 3.5–5.3)
PROT SERPL-MCNC: 6.3 G/DL — SIGNIFICANT CHANGE UP (ref 6–8.3)
PROT SERPL-MCNC: 6.3 G/DL — SIGNIFICANT CHANGE UP (ref 6–8.3)
RBC # BLD: 3.08 M/UL — LOW (ref 3.8–5.2)
RBC # BLD: 3.08 M/UL — LOW (ref 3.8–5.2)
RBC # FLD: 18.6 % — HIGH (ref 10.3–14.5)
RBC # FLD: 18.6 % — HIGH (ref 10.3–14.5)
SODIUM SERPL-SCNC: 135 MMOL/L — SIGNIFICANT CHANGE UP (ref 135–145)
SODIUM SERPL-SCNC: 135 MMOL/L — SIGNIFICANT CHANGE UP (ref 135–145)
SPECIMEN SOURCE: SIGNIFICANT CHANGE UP
WBC # BLD: 7.99 K/UL — SIGNIFICANT CHANGE UP (ref 3.8–10.5)
WBC # BLD: 7.99 K/UL — SIGNIFICANT CHANGE UP (ref 3.8–10.5)
WBC # FLD AUTO: 7.99 K/UL — SIGNIFICANT CHANGE UP (ref 3.8–10.5)
WBC # FLD AUTO: 7.99 K/UL — SIGNIFICANT CHANGE UP (ref 3.8–10.5)

## 2023-11-01 PROCEDURE — 31615 TRCHEOBRNCHSC EST TRACHS INC: CPT

## 2023-11-01 PROCEDURE — 99233 SBSQ HOSP IP/OBS HIGH 50: CPT

## 2023-11-01 PROCEDURE — 99223 1ST HOSP IP/OBS HIGH 75: CPT | Mod: FS,25

## 2023-11-01 RX ORDER — INSULIN GLARGINE 100 [IU]/ML
14 INJECTION, SOLUTION SUBCUTANEOUS EVERY MORNING
Refills: 0 | Status: DISCONTINUED | OUTPATIENT
Start: 2023-11-01 | End: 2023-11-21

## 2023-11-01 RX ORDER — IPRATROPIUM/ALBUTEROL SULFATE 18-103MCG
3 AEROSOL WITH ADAPTER (GRAM) INHALATION EVERY 6 HOURS
Refills: 0 | Status: DISCONTINUED | OUTPATIENT
Start: 2023-11-01 | End: 2023-11-12

## 2023-11-01 RX ORDER — ACETAMINOPHEN 500 MG
650 TABLET ORAL EVERY 6 HOURS
Refills: 0 | Status: DISCONTINUED | OUTPATIENT
Start: 2023-11-01 | End: 2023-11-09

## 2023-11-01 RX ORDER — LIDOCAINE 4 G/100G
1 CREAM TOPICAL DAILY
Refills: 0 | Status: DISCONTINUED | OUTPATIENT
Start: 2023-11-01 | End: 2023-12-22

## 2023-11-01 RX ADMIN — Medication 4: at 12:46

## 2023-11-01 RX ADMIN — CHLORHEXIDINE GLUCONATE 15 MILLILITER(S): 213 SOLUTION TOPICAL at 17:20

## 2023-11-01 RX ADMIN — Medication 5 MILLIGRAM(S): at 12:14

## 2023-11-01 RX ADMIN — SIMETHICONE 80 MILLIGRAM(S): 80 TABLET, CHEWABLE ORAL at 04:51

## 2023-11-01 RX ADMIN — Medication 6: at 05:40

## 2023-11-01 RX ADMIN — Medication 2 DROP(S): at 12:15

## 2023-11-01 RX ADMIN — LIDOCAINE 1 PATCH: 4 CREAM TOPICAL at 12:45

## 2023-11-01 RX ADMIN — GABAPENTIN 100 MILLIGRAM(S): 400 CAPSULE ORAL at 22:05

## 2023-11-01 RX ADMIN — Medication 1 TABLET(S): at 12:13

## 2023-11-01 RX ADMIN — CEFEPIME 100 MILLIGRAM(S): 1 INJECTION, POWDER, FOR SOLUTION INTRAMUSCULAR; INTRAVENOUS at 04:52

## 2023-11-01 RX ADMIN — Medication 4 MILLILITER(S): at 05:16

## 2023-11-01 RX ADMIN — Medication 2: at 17:21

## 2023-11-01 RX ADMIN — SIMETHICONE 80 MILLIGRAM(S): 80 TABLET, CHEWABLE ORAL at 12:13

## 2023-11-01 RX ADMIN — Medication 4 MILLILITER(S): at 17:28

## 2023-11-01 RX ADMIN — Medication 500 MILLIGRAM(S): at 12:13

## 2023-11-01 RX ADMIN — Medication 3 MILLILITER(S): at 11:39

## 2023-11-01 RX ADMIN — ENOXAPARIN SODIUM 40 MILLIGRAM(S): 100 INJECTION SUBCUTANEOUS at 04:52

## 2023-11-01 RX ADMIN — Medication 4 MILLILITER(S): at 11:39

## 2023-11-01 RX ADMIN — CEFEPIME 100 MILLIGRAM(S): 1 INJECTION, POWDER, FOR SOLUTION INTRAMUSCULAR; INTRAVENOUS at 14:18

## 2023-11-01 RX ADMIN — Medication 1 TABLET(S): at 04:52

## 2023-11-01 RX ADMIN — Medication 2 DROP(S): at 17:21

## 2023-11-01 RX ADMIN — Medication 1 TABLET(S): at 17:20

## 2023-11-01 RX ADMIN — Medication 3 MILLILITER(S): at 23:21

## 2023-11-01 RX ADMIN — QUETIAPINE FUMARATE 12.5 MILLIGRAM(S): 200 TABLET, FILM COATED ORAL at 22:04

## 2023-11-01 RX ADMIN — Medication 650 MILLIGRAM(S): at 04:53

## 2023-11-01 RX ADMIN — Medication 3 MILLILITER(S): at 17:29

## 2023-11-01 RX ADMIN — CHLORHEXIDINE GLUCONATE 1 APPLICATION(S): 213 SOLUTION TOPICAL at 04:53

## 2023-11-01 RX ADMIN — Medication 2 DROP(S): at 04:52

## 2023-11-01 RX ADMIN — CEFEPIME 100 MILLIGRAM(S): 1 INJECTION, POWDER, FOR SOLUTION INTRAMUSCULAR; INTRAVENOUS at 22:04

## 2023-11-01 RX ADMIN — Medication 5 MILLIGRAM(S): at 04:51

## 2023-11-01 RX ADMIN — LIDOCAINE 1 PATCH: 4 CREAM TOPICAL at 19:00

## 2023-11-01 RX ADMIN — Medication 3 MILLILITER(S): at 05:16

## 2023-11-01 RX ADMIN — CHLORHEXIDINE GLUCONATE 15 MILLILITER(S): 213 SOLUTION TOPICAL at 04:52

## 2023-11-01 RX ADMIN — PANTOPRAZOLE SODIUM 40 MILLIGRAM(S): 20 TABLET, DELAYED RELEASE ORAL at 12:13

## 2023-11-01 RX ADMIN — ZINC SULFATE TAB 220 MG (50 MG ZINC EQUIVALENT) 220 MILLIGRAM(S): 220 (50 ZN) TAB at 12:14

## 2023-11-01 RX ADMIN — ESCITALOPRAM OXALATE 10 MILLIGRAM(S): 10 TABLET, FILM COATED ORAL at 12:15

## 2023-11-01 RX ADMIN — Medication 100 MICROGRAM(S): at 04:51

## 2023-11-01 RX ADMIN — Medication 15 MILLILITER(S): at 12:13

## 2023-11-01 RX ADMIN — INSULIN GLARGINE 14 UNIT(S): 100 INJECTION, SOLUTION SUBCUTANEOUS at 12:12

## 2023-11-01 RX ADMIN — SIMETHICONE 80 MILLIGRAM(S): 80 TABLET, CHEWABLE ORAL at 17:20

## 2023-11-01 NOTE — PROGRESS NOTE ADULT - PROBLEM SELECTOR PLAN 2
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   - S/p CT abd/pelvis (10/28): Sacral decubitus ulcer extending to the coccyx with osseous remodeling and pelvic MRI or bone scan to recc to exclude osteomyelitis.  - MRI pelvis 10/30 with Osteomylitis, c/w current Cefepime for 7 days, Vancomycin d/marli   - Elevated, ESR 85   - Elevated,    - Wound care on board  - ID consult placed 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   - S/p CT abd/pelvis (10/28): Sacral decubitus ulcer extending to the coccyx with osseous remodeling and pelvic MRI or bone scan to recc to exclude osteomyelitis.  - MRI pelvis 10/30 with Osteomyelitis, c/w current Cefepime for 7 days, Vancomycin d/marli   - Elevated, ESR 85   - Elevated,    - Wound care on board  - ID consult placed

## 2023-11-01 NOTE — PROGRESS NOTE ADULT - ASSESSMENT
72 y/o F with PMHx of T2DM, HTN, HLD, anemia, hypothyroidism, RA, fibromyalgia, remote cerebral aneurysm repair, acute hypercapnic respiratory failure now with tracheostomy, PEG tube, and bedbound (trach change 8/18, PEG change 8/14), sacral pressure ulcer, BIBEMS from home for 6 day hx of bleeding from the tracheostomy and PEG tube with associated abdominal pain, recent history of auditory and visual hallucinations, and with chronic sacral decubitus ulcer. Exam notable for mild abdominal distention with LLQ and RLQ tenderness, tracheostomy in place with no obvious bleeding, PEG tube with scant amount of dried blood, at baseline neurologic status. Imaging showing no active bleeding around tracheostomy site, however was notable for mild thickening along PEG tube tract, sacral ulcer extending to the coccyx suggestive of possible osteomyelitis, and bibasilar patchy lung opacities with volume loss. Patient admitted for respiratory support, wound care of tracheostomy and PEG, and management of presumed sacral osteomyelitis.    10/28: Accepted from MICU overnight from ER; Chronic vent dependent from home; Stable on home vent settings. Started Empiric Abx for suspected PNA and possible sacral OM in setting of chronic sacral decubiti. Daughter c/o intermittent oozing (bleeding) from trach/peg site. CT imaging performed, no active bleeding source noted. F/u all Cultures. ID and GI consults called. Will consider ent consult on Mon if further trach bleed. Call Psych cx on Mon.     10/30: Lukasz saw the patient. Sputum cx with pseudomonas and staph, changed aztreonam to Cefepime as per ID, sent MRSA PCR, Hyperglycemia added Lantus 10units daily and increased sliding scale to moderate, pt went for MRI of the pelvis    10/31: Osteomyelitis as per MRI, ID recommending a total to 6wks of abx. Cefepime to be finished by 11/5 and then Cipro and Keflex to be added for the next 5 weeks, MRSA PCR negative d/ed Vancomycin, started on Lovenox ppx. Had lengthy conversations with daughter. She is concerned of patient's brain aneurysm and requesting a MRI of the brain and also requesting wound culture. Wound care on board.  72 y/o F with PMHx of T2DM, HTN, HLD, anemia, hypothyroidism, RA, fibromyalgia, remote cerebral aneurysm repair, acute hypercapnic respiratory failure now with tracheostomy, PEG tube, and bedbound (trach change 8/18, PEG change 8/14), sacral pressure ulcer, BIBEMS from home for 6 day hx of bleeding from the tracheostomy and PEG tube with associated abdominal pain, recent history of auditory and visual hallucinations, and with chronic sacral decubitus ulcer. Exam notable for mild abdominal distention with LLQ and RLQ tenderness, tracheostomy in place with no obvious bleeding, PEG tube with scant amount of dried blood, at baseline neurologic status. Imaging showing no active bleeding around tracheostomy site, however was notable for mild thickening along PEG tube tract, sacral ulcer extending to the coccyx suggestive of possible osteomyelitis, and bibasilar patchy lung opacities with volume loss. Patient admitted for respiratory support, wound care of tracheostomy and PEG, and management of presumed sacral osteomyelitis.    10/28: Accepted from MICU overnight from ER; Chronic vent dependent from home; Stable on home vent settings. Started Empiric Abx for suspected PNA and possible sacral OM in setting of chronic sacral decubiti. Daughter c/o intermittent oozing (bleeding) from trach/peg site. CT imaging performed, no active bleeding source noted. F/u all Cultures. ID and GI consults called. Will consider ent consult on Mon if further trach bleed. Call Psych cx on Mon.     10/30: Lukasz saw the patient. Sputum cx with pseudomonas and staph, changed aztreonam to Cefepime as per ID, sent MRSA PCR, Hyperglycemia added Lantus 10units daily and increased sliding scale to moderate, pt went for MRI of the pelvis    10/31: Osteomyelitis as per MRI, ID recommending a total to 6wks of abx. Cefepime to be finished by 11/5 and then Cipro and Keflex to be added for the next 5 weeks, MRSA PCR negative d/ed Vancomycin, started on Lovenox ppx. Had lengthy conversations with daughter. She is concerned of patient's brain aneurysm and requesting a MRI of the brain and also requesting wound culture. Wound care on board.     11/1: Remains on Cefepime for Pseudomonas and S. Aureus pneumonia to finish 11/5 then change to PO Cipro/Keflex x5 weeks. Will consult ENT regarding persistent blood tinged sputum and as per family request. Added lidocaine patch, percocet standing q6 hrs and changed tylenol to PRN for pain control. Increased Lantus to home dose 14U. PSV 10-15/5 as tolerated during day.

## 2023-11-01 NOTE — PROGRESS NOTE ADULT - NS ATTEND AMEND GEN_ALL_CORE FT
73F w/ DM, HTN, HLD, anemia, hypothyroidism, RA, fibromyalgia, remote cerebral aneursym repair, hypercapnic respiratory failure s/p tracheostomy/PEG, bedbound, sacral pressure ulcer. Admitted w/ bleeding from tracheostomy and PEG w/ associated abdominal pain, recent hx of auditory/visual hallucinations. Since admission, no further bleeding noted from either trach or PEG. Imaging showed mild thickening along PEG tube tract and sacral ulcer extending to coccyx suggestive of possible OM.     Pt's mental status is at baseline. Psych saw pt, continue lexapro and seroquel. Fentanyl patch for pain, add percocet ATC and lidocaine patch w/ tylenol PRN. Trach to vent, which is baseline, continue PS weaning daily - attempted TC yesterday which she did not tolerate; chest PT for airway clearance. Family requesting ENT evaluation given bleeding, will ask for evaluation. Sputum growing MSSA and pseudomonas, currently on cefepime; noted acute OM on MRI of pelvis - per ID to start PO keflex and cipro after completes 7 days of cefepime. Wound care for sacral wound. Tolerating TF, continue bowel regimen; GI evaluated pt, recommending decreasing feed rate and loosened PEG tube. Small amount of blood seen around PEG but no active bleeding. Favor starting pharmacologic DVT ppx, discuss w/ family heparin allergy. Continue levothyroxine. Continue lantus and ISS for DM. Prednisone for RA. SCDs for DVT ppx. Full code. D/c likely when finish abx - CM to continue to meet w/ family regarding d/c planning

## 2023-11-01 NOTE — PROGRESS NOTE ADULT - SUBJECTIVE AND OBJECTIVE BOX
Patient is a 73y old  Female who presents with a chief complaint of 74 y/o F with PMHx of T2DM, HTN, HLD, anemia, hypothyroidism, RA, fibromyalgia, remote cerebral aneurysm repair, acute hypercapnic respiratory failure now with tracheostomy, PEG tube, and bedbound (trach change 8/18, PEG change 8/14), sacral pressure ulcer, BIBEMS from home for 6 day hx of bleeding from the tracheostomy and PEG tube with associated abdominal pain, recent history of auditory and visual hallucinations, and with chronic sacral decubitus ulcer. (29 Oct 2023 14:01)    Interval Events:    REVIEW OF SYSTEMS:  [ ] Positive  [ ] All other systems negative  [ ] Unable to assess ROS because ________    Vital Signs Last 24 Hrs  T(C): 36.6 (11-01-23 @ 05:00), Max: 37.2 (10-31-23 @ 20:39)  T(F): 97.8 (11-01-23 @ 05:00), Max: 99 (10-31-23 @ 20:39)  HR: 73 (11-01-23 @ 05:16) (63 - 89)  BP: 123/58 (11-01-23 @ 05:00) (114/63 - 127/62)  RR: 18 (11-01-23 @ 05:00) (13 - 18)  SpO2: 99% (11-01-23 @ 05:16) (89% - 100%)    PHYSICAL EXAM:  HEENT:   [ ]Tracheostomy:  [ ]Pupils equal  [ ]No oral lesions  [ ]Abnormal    SKIN  [ ]No Rash  [ ] Abnormal  [ ] pressure    CARDIAC  [ ]Regular  [ ]Abnormal    PULMONARY  [ ]Bilateral Clear Breath Sounds  [ ]Normal Excursion  [ ]Abnormal    GI  [ ]PEG      [ ] +BS		              [ ]Soft, nondistended, nontender	  [ ]Abnormal    MUSCULOSKELETAL                                   [ ]Bedbound                 [ ]Abnormal    [ ]Ambulatory/OOB to chair                           EXTREMITIES                                         [ ]Normal  [ ]Edema                           NEUROLOGIC  [ ] Normal, non focal  [ ] Focal findings:    PSYCHIATRIC  [ ]Alert and appropriate  [ ] Sedated	 [ ]Agitated    :  Mcbride: [ ] Yes, if yes: Date of Placement:                   [  ] No    LINES: Central Lines [ ] Yes, if yes: Date of Placement                                     [  ] No    HOSPITAL MEDICATIONS:  MEDICATIONS  (STANDING):  acetaminophen   Oral Liquid .. 650 milliGRAM(s) Oral every 6 hours  acetylcysteine 10%  Inhalation 4 milliLiter(s) Inhalation every 6 hours  albuterol/ipratropium for Nebulization 3 milliLiter(s) Nebulizer every 6 hours  artificial  tears Solution 2 Drop(s) Both EYES every 6 hours  ascorbic acid 500 milliGRAM(s) Oral daily  calcium carbonate    500 mG (Tums) Chewable 1 Tablet(s) Chew every 12 hours  cefepime   IVPB 2000 milliGRAM(s) IV Intermittent every 8 hours  chlorhexidine 0.12% Liquid 15 milliLiter(s) Oral Mucosa every 12 hours  chlorhexidine 4% Liquid 1 Application(s) Topical <User Schedule>  dextrose 50% Injectable 50 milliLiter(s) IV Push once  enoxaparin Injectable 40 milliGRAM(s) SubCutaneous every 24 hours  escitalopram 10 milliGRAM(s) Oral daily  fentaNYL   Patch  25 MICROgram(s)/Hr 1 Patch Transdermal every 72 hours  gabapentin Solution 100 milliGRAM(s) Enteral Tube at bedtime  insulin glargine Injectable (LANTUS) 10 Unit(s) SubCutaneous every morning  insulin lispro (ADMELOG) corrective regimen sliding scale   SubCutaneous every 6 hours  lactobacillus acidophilus 1 Tablet(s) Oral daily  levothyroxine 100 MICROGram(s) Enteral Tube daily  multivitamin/minerals/iron Oral Solution (CENTRUM) 15 milliLiter(s) Oral daily  oxybutynin 5 milliGRAM(s) Oral daily  pantoprazole   Suspension 40 milliGRAM(s) Enteral Tube daily  predniSONE   Tablet 5 milliGRAM(s) Oral daily  QUEtiapine 12.5 milliGRAM(s) Oral at bedtime  simethicone 80 milliGRAM(s) Chew four times a day  zinc sulfate 220 milliGRAM(s) Oral daily    MEDICATIONS  (PRN):  sodium chloride 0.65% Nasal 2 Spray(s) Both Nostrils two times a day PRN Nasal Congestion      LABS:                        8.5    7.99  )-----------( 130      ( 01 Nov 2023 06:28 )             29.3     11-01    135  |  101  |  28<H>  ----------------------------<  247<H>  4.0   |  25  |  <0.30<L>    Ca    8.7      01 Nov 2023 06:28  Phos  3.4     11-01  Mg     2.0     11-01    TPro  6.3  /  Alb  2.7<L>  /  TBili  0.2  /  DBili  x   /  AST  21  /  ALT  24  /  AlkPhos  52  11-01      Urinalysis Basic - ( 01 Nov 2023 06:28 )    Color: x / Appearance: x / SG: x / pH: x  Gluc: 247 mg/dL / Ketone: x  / Bili: x / Urobili: x   Blood: x / Protein: x / Nitrite: x   Leuk Esterase: x / RBC: x / WBC x   Sq Epi: x / Non Sq Epi: x / Bacteria: x          CAPILLARY BLOOD GLUCOSE    MICROBIOLOGY:     RADIOLOGY:  [ ] Reviewed and interpreted by me    Mode: AC/ CMV (Assist Control/ Continuous Mandatory Ventilation)  RR (machine): 12  TV (machine): 300  FiO2: 30  PEEP: 5  ITime: 1  MAP: 8  PIP: 24   Patient is a 73y old  Female who presents with a chief complaint of 74 y/o F with PMHx of T2DM, HTN, HLD, anemia, hypothyroidism, RA, fibromyalgia, remote cerebral aneurysm repair, acute hypercapnic respiratory failure now with tracheostomy, PEG tube, and bedbound (trach change 8/18, PEG change 8/14), sacral pressure ulcer, BIBEMS from home for 6 day hx of bleeding from the tracheostomy and PEG tube with associated abdominal pain, recent history of auditory and visual hallucinations, and with chronic sacral decubitus ulcer. (29 Oct 2023 14:01)    Interval Events: no acute events     REVIEW OF SYSTEMS:  [ ] Positive  [ X ] All other systems negative  [ ] Unable to assess ROS because ________    Vital Signs Last 24 Hrs  T(C): 36.6 (11-01-23 @ 05:00), Max: 37.2 (10-31-23 @ 20:39)  T(F): 97.8 (11-01-23 @ 05:00), Max: 99 (10-31-23 @ 20:39)  HR: 73 (11-01-23 @ 05:16) (63 - 89)  BP: 123/58 (11-01-23 @ 05:00) (114/63 - 127/62)  RR: 18 (11-01-23 @ 05:00) (13 - 18)  SpO2: 99% (11-01-23 @ 05:16) (89% - 100%)    PHYSICAL EXAM:  HEENT:   [ X ]Tracheostomy: #8 cuffed shiley  [ X ]Pupils equal  [ ]No oral lesions  [ ]Abnormal    SKIN  [ X ] No Rash  [ X ] Abnormal - sacral wound  [ ] pressure    CARDIAC  [ X ]Regular  [ ]Abnormal    PULMONARY  [ ] Bilateral Clear Breath Sounds  [ ]Normal Excursion  [ X ] Abnormal - R sided rhonchi, diminished BS at b/l bases    GI  [ X]PEG      [ X] +BS		              [ X]Soft, nondistended, nontender	  [ ]Abnormal    MUSCULOSKELETAL                                   [ X ]Bedbound                 [ ]Abnormal    [ ]Ambulatory/OOB to chair                           EXTREMITIES                                         [ X ]Normal  [ ]Edema                           NEUROLOGIC  [ X] Normal, non focal  [ ] Focal findings:    PSYCHIATRIC  [ X]Alert and appropriate  [ ] Sedated	 [ ]Agitated    :  Mcbride: [ ] Yes, if yes: Date of Placement:                   [  X ] No    LINES: Central Lines [ ] Yes, if yes: Date of Placement                                     [  X ] No    HOSPITAL MEDICATIONS:  MEDICATIONS  (STANDING):  acetaminophen   Oral Liquid .. 650 milliGRAM(s) Oral every 6 hours  acetylcysteine 10%  Inhalation 4 milliLiter(s) Inhalation every 6 hours  albuterol/ipratropium for Nebulization 3 milliLiter(s) Nebulizer every 6 hours  artificial  tears Solution 2 Drop(s) Both EYES every 6 hours  ascorbic acid 500 milliGRAM(s) Oral daily  calcium carbonate    500 mG (Tums) Chewable 1 Tablet(s) Chew every 12 hours  cefepime   IVPB 2000 milliGRAM(s) IV Intermittent every 8 hours  chlorhexidine 0.12% Liquid 15 milliLiter(s) Oral Mucosa every 12 hours  chlorhexidine 4% Liquid 1 Application(s) Topical <User Schedule>  dextrose 50% Injectable 50 milliLiter(s) IV Push once  enoxaparin Injectable 40 milliGRAM(s) SubCutaneous every 24 hours  escitalopram 10 milliGRAM(s) Oral daily  fentaNYL   Patch  25 MICROgram(s)/Hr 1 Patch Transdermal every 72 hours  gabapentin Solution 100 milliGRAM(s) Enteral Tube at bedtime  insulin glargine Injectable (LANTUS) 10 Unit(s) SubCutaneous every morning  insulin lispro (ADMELOG) corrective regimen sliding scale   SubCutaneous every 6 hours  lactobacillus acidophilus 1 Tablet(s) Oral daily  levothyroxine 100 MICROGram(s) Enteral Tube daily  multivitamin/minerals/iron Oral Solution (CENTRUM) 15 milliLiter(s) Oral daily  oxybutynin 5 milliGRAM(s) Oral daily  pantoprazole   Suspension 40 milliGRAM(s) Enteral Tube daily  predniSONE   Tablet 5 milliGRAM(s) Oral daily  QUEtiapine 12.5 milliGRAM(s) Oral at bedtime  simethicone 80 milliGRAM(s) Chew four times a day  zinc sulfate 220 milliGRAM(s) Oral daily    MEDICATIONS  (PRN):  sodium chloride 0.65% Nasal 2 Spray(s) Both Nostrils two times a day PRN Nasal Congestion      LABS:                        8.5    7.99  )-----------( 130      ( 01 Nov 2023 06:28 )             29.3     11-01    135  |  101  |  28<H>  ----------------------------<  247<H>  4.0   |  25  |  <0.30<L>    Ca    8.7      01 Nov 2023 06:28  Phos  3.4     11-01  Mg     2.0     11-01    TPro  6.3  /  Alb  2.7<L>  /  TBili  0.2  /  DBili  x   /  AST  21  /  ALT  24  /  AlkPhos  52  11-01      Urinalysis Basic - ( 01 Nov 2023 06:28 )    Color: x / Appearance: x / SG: x / pH: x  Gluc: 247 mg/dL / Ketone: x  / Bili: x / Urobili: x   Blood: x / Protein: x / Nitrite: x   Leuk Esterase: x / RBC: x / WBC x   Sq Epi: x / Non Sq Epi: x / Bacteria: x          CAPILLARY BLOOD GLUCOSE    MICROBIOLOGY:     RADIOLOGY:  [ ] Reviewed and interpreted by me    Mode: AC/ CMV (Assist Control/ Continuous Mandatory Ventilation)  RR (machine): 12  TV (machine): 300  FiO2: 30  PEEP: 5  ITime: 1  MAP: 8  PIP: 24

## 2023-11-01 NOTE — CONSULT NOTE ADULT - ASSESSMENT
Patient is a 73y old  Female who presents with a chief complaint of 72 y/o F with PMHx of T2DM, HTN, HLD, anemia, hypothyroidism, RA, fibromyalgia, remote cerebral aneurysm repair, acute hypercapnic respiratory failure now with tracheostomy, PEG tube, and bedbound (trach change 8/18, PEG change 8/14), sacral pressure ulcer, BIBEMS from home for 6 day hx of bleeding from the tracheostomy and PEG tube with associated abdominal pain, recent history of auditory and visual hallucinations, and with chronic sacral decubitus ulcer. ENT called for trach eval after bloody secretions suctioned from trach tube. Pt daughter at bedside, states she has had small amount of blood tinged sputum for the past few days and blood from peg tube. Pt on chronic vent, small amounts of blood on drain sponge and occasional nose bleeds per daughter. Pt also complaining of Right ear itchiness and pain. Pt denies any changes in n/v, tinnitus, dizziness, ear pain, congestion, recent URI, otorrhea, hearing loss, hx of sx or trauma or recent travel. On exam, b/la ears clear with small flakes of  cerumen no occluding EAC. #8 shiley LPC trach in neck with small amount of blood tinges secretions in inner cannula, no active bleed or ooze from stoma or on tracheoscopy. Kath and b/l bronchi visualized without any trauma. small amount of blood tinges secretions resting at beginning of right bronchus not actively bleeding.

## 2023-11-01 NOTE — CHART NOTE - NSCHARTNOTEFT_GEN_A_CORE
Nutrition Follow Up Note  Patient seen for: verbal consult request to speak with pt daughter.    Chart reviewed. Events noted. Per chart: 73y old  Female who presents with a chief complaint of 74 y/o F with PMHx of T2DM, HTN, HLD, anemia, hypothyroidism, RA, fibromyalgia, remote cerebral aneurysm repair, acute hypercapnic respiratory failure now with tracheostomy, PEG tube, and bedbound (trach change , PEG change ), sacral pressure ulcer, BIBEMS from home for 6 day hx of bleeding from the tracheostomy and PEG tube with associated abdominal pain, recent history of auditory and visual hallucinations, and with chronic sacral decubitus ulcer.    Source: [] Patient       [x] Medical Record        [] RN        [x] Family at bedside - daughter     [x] Other: assistant nurse manager    -If unable to interview patient: [x] Trach/Vent/BiPAP  [] Disoriented/confused/inappropriate to interview    Diet Order:   Diet, NPO with Tube Feed:   Tube Feeding Modality: Gastrostomy  Casie Farns Peptide 1.5  Total Volume for 24 Hours (mL): 960  Continuous  Starting Tube Feed Rate {mL per Hour}: 10  Increase Tube Feed Rate by (mL): 10     Every 4 hours  Until Goal Tube Feed Rate (mL per Hour): 40  Tube Feed Duration (in Hours): 24  Tube Feed Start Time: 13:00   Rate (mL per Hour): 10   Frequency: Every 2 Hours  Alfred(7 Gm Arginine/7 Gm Glut/1.2 Gm HMB     Qty per Day:  3  No Carb Prosource TF     Qty per Day:  1 (10-30-23)    EN order + prosource providing if 100% provision: 1517kcal and 81g protein    - Is current order appropriate/adequate? see below for recommendations    Nutrition-related concerns:  -Extensive conversation held with daughter. Per daughter, pt receiving water by mouth as well as small bites of puree meals. Daughter requesting SLP consult. Daughter providing pt with [employer] Peptide 1.5 tube feeding via PEG, varied amounts daily pending pt tolerance that day (20-50ml/hr varied). Sometimes pt will has lose stool vs. gas. Provided continuos water flush as daughter states it helps pt digest her tube feeding better. Pt is not vegan, consumes dairy products however would not like Alfred sent as it contains beef collagen.   -Seen by GI. PEG loosed at bedside this admission. With PEG leaking, tube feeding rate dropped to 40ml and resolved.   -Free water ordered: 10ml every 2 hours.   -Hgb A1c 7.5%, DM2 level. on insulin regimen to maintain glycemic control. POCT Blood glucose being monitored. [employer] peptide 1.5 is carbohydrate controlled to aid in glycemic control. Comparable to Glucerna 1.5 (138g CHO vs. 133g CHO per Liter).   -Enteral synthroid noted.     GI: lose stool noted 10/31. Last BM  per flow sheets. Bowel Regimen? [] Yes   [x] No    Weights:   10/29:  reports pt UBW 90 pounds prior to illness  Dosing weight 54.6kg  Daily Weight in k.9 ()  RD will continue to monitor trends.   BMI using most recent weight and EMR height 147.3cm: 23.5    Nutritionally Pertinent MEDICATIONS  (STANDING):  ascorbic acid  calcium carbonate    500 mG (Tums) Chewable  cefepime   IVPB  dextrose 50% Injectable  insulin glargine Injectable (LANTUS)  insulin lispro (ADMELOG) corrective regimen sliding scale  levothyroxine  multivitamin/minerals/iron Oral Solution (CENTRUM)  pantoprazole   Suspension  predniSONE   Tablet  simethicone  zinc sulfate    Pertinent Labs:  @ 06:28: Na 135, BUN 28<H>, Cr <0.30<L>, <H>, K+ 4.0, Phos 3.4, Mg 2.0, Alk Phos 52, ALT/SGPT 24, AST/SGOT 21, HbA1c --    A1C with Estimated Average Glucose Result: 7.5 % (10-28-23 @ 07:18)    Finger Sticks:  POCT Blood Glucose.: 237 mg/dL ( @ 12:14)  POCT Blood Glucose.: 294 mg/dL ( @ 05:37)  POCT Blood Glucose.: 207 mg/dL (10-31 @ 23:40)  POCT Blood Glucose.: 145 mg/dL (10-31 @ 17:12)    Skin per nursing documentation: sacrum stage IV.   Edema per nursing documentation: 1+ left arm per flow sheets    Estimated Needs using most recent weight 50.9kg  27-32kcal/kg 1374-1628kcal/day  1.2-1.4g/kg 61-71g protein/day  defer free water to team    Previous Nutrition Diagnosis: Increased Nutrient Needs  Nutrition Diagnosis is: [x] ongoing  [] resolved [] not applicable     Nutrition Care Plan:  [] In Progress  [] Achieved  [] Not applicable    New Nutrition Diagnosis: [x] Not applicable    Nutrition Interventions:  See conversation with daughter above and recommendaions below.     Recommendations:      -Daughter requesting 20 hour feeding regimen. Will also be better for synthroid administration. Consider 45ml/hr x 20 hours to provide a total volume of 900ml, 1385kcal (27kcal/kg), 66g protein (1.3g/kg) and 631ml free water. d/c prosource as pt receiving adequate protein via tube feeding.   -Defer free water flush to team. Family requesting water flushes in conjunction with tube feeding. Consider 30ml/hr every hour.   -Consider SLP consult per daughter request.    -Continue zinc per family request. will also aid in wound healing. monitor need to continue after 11/3 (7 days after start) to avoid med-nutrient interactions.   -Continue vitamin C and Multivitamin. will aid in wound healing. family requesting to also continue this.   -Daughter requesting to d/c Alfred, consider d/c per family request as it is not vegan.     Monitoring and Evaluation:   Continue to monitor nutritional intake, tolerance to diet prescription, weights, labs, skin integrity    RD remains available upon request and will follow up per protocol  Rufina Dominguez, MS, RD, CDN / teams Nutrition Follow Up Note  Patient seen for: verbal consult request to speak with pt daughter.    Chart reviewed. Events noted. Per chart: 73y old  Female who presents with a chief complaint of 74 y/o F with PMHx of T2DM, HTN, HLD, anemia, hypothyroidism, RA, fibromyalgia, remote cerebral aneurysm repair, acute hypercapnic respiratory failure now with tracheostomy, PEG tube, and bedbound (trach change , PEG change ), sacral pressure ulcer, BIBEMS from home for 6 day hx of bleeding from the tracheostomy and PEG tube with associated abdominal pain, recent history of auditory and visual hallucinations, and with chronic sacral decubitus ulcer.    Source: [] Patient       [x] Medical Record        [] RN        [x] Family at bedside - daughter     [x] Other: assistant nurse manager, PA    -If unable to interview patient: [x] Trach/Vent/BiPAP  [] Disoriented/confused/inappropriate to interview    Diet Order:   Diet, NPO with Tube Feed:   Tube Feeding Modality: Gastrostomy  Casie Farns Peptide 1.5  Total Volume for 24 Hours (mL): 960  Continuous  Starting Tube Feed Rate {mL per Hour}: 10  Increase Tube Feed Rate by (mL): 10     Every 4 hours  Until Goal Tube Feed Rate (mL per Hour): 40  Tube Feed Duration (in Hours): 24  Tube Feed Start Time: 13:00   Rate (mL per Hour): 10   Frequency: Every 2 Hours  Alfred(7 Gm Arginine/7 Gm Glut/1.2 Gm HMB     Qty per Day:  3  No Carb Prosource TF     Qty per Day:  1 (10-30-23)    EN order + prosource providing if 100% provision: 1517kcal and 81g protein    - Is current order appropriate/adequate? see below for recommendations    Nutrition-related concerns:  -Extensive conversation held with daughter. Per daughter, pt receiving water by mouth as well as small bites of puree meals. Daughter requesting SLP consult. Daughter providing pt with [employer] Peptide 1.5 tube feeding via PEG, varied amounts daily pending pt tolerance that day (20-50ml/hr varied). Sometimes pt will has lose stool vs. gas. Provided continuos water flush as daughter states it helps pt digest her tube feeding better. Pt is vegan, however consumes dairy products. Would not like Alfred sent as it contains beef collagen.   -Seen by GI. PEG loosed at bedside this admission. With PEG leaking, tube feeding rate dropped to 40ml and resolved.   -Free water ordered: 10ml every 2 hours.   -Hgb A1c 7.5%, DM2 level. on insulin regimen to maintain glycemic control. POCT Blood glucose being monitored. [employer] peptide 1.5 is carbohydrate controlled to aid in glycemic control. Comparable to Glucerna 1.5 (138g CHO vs. 133g CHO per Liter).   -Enteral synthroid noted.     GI: lose stool noted 10/31. Last BM  per flow sheets. Bowel Regimen? [] Yes   [x] No    Weights:   10/29:  reports pt UBW 90 pounds prior to illness  Dosing weight 54.6kg  Daily Weight in k.9 ()  RD will continue to monitor trends.   BMI using most recent weight and EMR height 147.3cm: 23.5    Nutritionally Pertinent MEDICATIONS  (STANDING):  ascorbic acid  calcium carbonate    500 mG (Tums) Chewable  cefepime   IVPB  dextrose 50% Injectable  insulin glargine Injectable (LANTUS)  insulin lispro (ADMELOG) corrective regimen sliding scale  levothyroxine  multivitamin/minerals/iron Oral Solution (CENTRUM)  pantoprazole   Suspension  predniSONE   Tablet  simethicone  zinc sulfate    Pertinent Labs:  @ 06:28: Na 135, BUN 28<H>, Cr <0.30<L>, <H>, K+ 4.0, Phos 3.4, Mg 2.0, Alk Phos 52, ALT/SGPT 24, AST/SGOT 21, HbA1c --    A1C with Estimated Average Glucose Result: 7.5 % (10-28-23 @ 07:18)    Finger Sticks:  POCT Blood Glucose.: 237 mg/dL ( @ 12:14)  POCT Blood Glucose.: 294 mg/dL ( @ 05:37)  POCT Blood Glucose.: 207 mg/dL (10-31 @ 23:40)  POCT Blood Glucose.: 145 mg/dL (10-31 @ 17:12)    Skin per nursing documentation: sacrum stage IV.   Edema per nursing documentation: 1+ left arm per flow sheets    Estimated Needs using most recent weight 50.9kg  27-32kcal/kg 1374-1628kcal/day  1.2-1.4g/kg 61-71g protein/day  defer free water to team    Previous Nutrition Diagnosis: Increased Nutrient Needs  Nutrition Diagnosis is: [x] ongoing  [] resolved [] not applicable     Nutrition Care Plan:  [] In Progress  [] Achieved  [] Not applicable    New Nutrition Diagnosis: [x] Not applicable    Nutrition Interventions:  See conversation with daughter above and recommendaions below.     Recommendations:      -Daughter requesting 20 hour feeding regimen. Will also be better for synthroid administration. Consider 45ml/hr x 20 hours to provide a total volume of 900ml, 1385kcal (27kcal/kg), 66g protein (1.3g/kg) and 631ml free water. d/c prosource as pt receiving adequate protein via tube feeding.   -Defer free water flush to team. Family requesting water flushes in conjunction with tube feeding. Consider 30ml/hr every hour.   -Consider SLP consult per daughter request.    -Continue zinc per family request. will also aid in wound healing. monitor need to continue after 11/3 (7 days after start) to avoid med-nutrient interactions.   -Continue vitamin C and Multivitamin. will aid in wound healing. family requesting to also continue this.   -Daughter requesting to d/c Alfred, consider d/c per family request as it is not vegan.     Monitoring and Evaluation:   Continue to monitor nutritional intake, tolerance to diet prescription, weights, labs, skin integrity    RD remains available upon request and will follow up per protocol  Rufina Dominguez, MS, RD, CDN / teams Nutrition Follow Up Note  Patient seen for: verbal consult request to speak with pt daughter.    Chart reviewed. Events noted. Per chart: 73y old  Female who presents with a chief complaint of 72 y/o F with PMHx of T2DM, HTN, HLD, anemia, hypothyroidism, RA, fibromyalgia, remote cerebral aneurysm repair, acute hypercapnic respiratory failure now with tracheostomy, PEG tube, and bedbound (trach change , PEG change ), sacral pressure ulcer, BIBEMS from home for 6 day hx of bleeding from the tracheostomy and PEG tube with associated abdominal pain, recent history of auditory and visual hallucinations, and with chronic sacral decubitus ulcer.    Source: [] Patient       [x] Medical Record        [] RN        [x] Family at bedside - daughter     [x] Other: assistant nurse manager, PA.     -If unable to interview patient: [x] Trach/Vent/BiPAP  [] Disoriented/confused/inappropriate to interview    Diet Order:   Diet, NPO with Tube Feed:   Tube Feeding Modality: Gastrostomy  Casie Farns Peptide 1.5  Total Volume for 24 Hours (mL): 960  Continuous  Starting Tube Feed Rate {mL per Hour}: 10  Increase Tube Feed Rate by (mL): 10     Every 4 hours  Until Goal Tube Feed Rate (mL per Hour): 40  Tube Feed Duration (in Hours): 24  Tube Feed Start Time: 13:00   Rate (mL per Hour): 10   Frequency: Every 2 Hours  Alfred(7 Gm Arginine/7 Gm Glut/1.2 Gm HMB     Qty per Day:  3  No Carb Prosource TF     Qty per Day:  1 (10-30-23)    EN order + prosource providing if 100% provision: 1517kcal and 81g protein    - Is current order appropriate/adequate? see below for recommendations    Nutrition-related concerns:  -Extensive conversation held with daughter. Per daughter, pt receiving water by mouth as well as small bites of puree meals. Daughter requesting SLP consult. Daughter providing pt with [employer] Peptide 1.5 tube feeding via PEG, varied amounts daily pending pt tolerance that day (20-50ml/hr varied). Sometimes pt will has lose stool vs. gas. Provided continuos water flush as daughter states it helps pt digest her tube feeding better. Pt is vegan, however consumes dairy products. Would not like Alfred sent as it contains beef collagen.   -Seen by GI. PEG loosed at bedside this admission. With PEG leaking, tube feeding rate dropped to 40ml and resolved.   -Free water ordered: 10ml every 2 hours.   -Hgb A1c 7.5%, DM2 level. on insulin regimen to maintain glycemic control. POCT Blood glucose being monitored. [employer] peptide 1.5 is carbohydrate controlled to aid in glycemic control. Comparable to Glucerna 1.5 (138g CHO vs. 133g CHO per Liter).   -Enteral synthroid noted.     GI: lose stool noted 10/31. Last BM  per flow sheets. Bowel Regimen? [] Yes   [x] No    Weights:   10/29:  reports pt UBW 90 pounds prior to illness  Dosing weight 54.6kg  Daily Weight in k.9 ()  RD will continue to monitor trends.   BMI using most recent weight and EMR height 147.3cm: 23.5    Nutritionally Pertinent MEDICATIONS  (STANDING):  ascorbic acid  calcium carbonate    500 mG (Tums) Chewable  cefepime   IVPB  dextrose 50% Injectable  insulin glargine Injectable (LANTUS)  insulin lispro (ADMELOG) corrective regimen sliding scale  levothyroxine  multivitamin/minerals/iron Oral Solution (CENTRUM)  pantoprazole   Suspension  predniSONE   Tablet  simethicone  zinc sulfate    Pertinent Labs:  @ 06:28: Na 135, BUN 28<H>, Cr <0.30<L>, <H>, K+ 4.0, Phos 3.4, Mg 2.0, Alk Phos 52, ALT/SGPT 24, AST/SGOT 21, HbA1c --    A1C with Estimated Average Glucose Result: 7.5 % (10-28-23 @ 07:18)    Finger Sticks:  POCT Blood Glucose.: 237 mg/dL ( @ 12:14)  POCT Blood Glucose.: 294 mg/dL ( @ 05:37)  POCT Blood Glucose.: 207 mg/dL (10-31 @ 23:40)  POCT Blood Glucose.: 145 mg/dL (10-31 @ 17:12)    Skin per nursing documentation: sacrum stage IV.   Edema per nursing documentation: 1+ left arm per flow sheets    Estimated Needs using most recent weight 50.9kg  27-32kcal/kg 1374-1628kcal/day  1.2-1.4g/kg 61-71g protein/day  defer free water to team    Previous Nutrition Diagnosis: Increased Nutrient Needs  Nutrition Diagnosis is: [x] ongoing  [] resolved [] not applicable     Nutrition Care Plan:  [] In Progress  [] Achieved  [] Not applicable    New Nutrition Diagnosis: [x] Not applicable    Nutrition Interventions:  See conversation with daughter above and recommendaions below.     Recommendations:      -Daughter requesting 20 hour feeding regimen. Will also be better for synthroid administration. Consider 45ml/hr x 20 hours to provide a total volume of 900ml, 1385kcal (27kcal/kg), 66g protein (1.3g/kg) and 631ml free water. d/c prosource as pt receiving adequate protein via tube feeding.   -Defer free water flush to team. Family requesting water flushes in conjunction with tube feeding. Consider 30ml/hr every hour.   -Consider SLP consult per daughter request.    -Continue zinc per family request. will also aid in wound healing. monitor need to continue after 11/3 (7 days after start) to avoid med-nutrient interactions.   -Continue vitamin C and Multivitamin. will aid in wound healing. family requesting to also continue this.   -Daughter requesting to d/c Alfred, consider d/c per family request as it is not vegan.     Monitoring and Evaluation:   Continue to monitor nutritional intake, tolerance to diet prescription, weights, labs, skin integrity    RD remains available upon request and will follow up per protocol  Rufina Dominguez, MS, RD, CDN / teams Nutrition Follow Up Note  Patient seen for: verbal consult request to speak with pt daughter.    Chart reviewed. Events noted. Per chart: 73y old  Female who presents with a chief complaint of 72 y/o F with PMHx of T2DM, HTN, HLD, anemia, hypothyroidism, RA, fibromyalgia, remote cerebral aneurysm repair, acute hypercapnic respiratory failure now with tracheostomy, PEG tube, and bedbound (trach change , PEG change ), sacral pressure ulcer, BIBEMS from home for 6 day hx of bleeding from the tracheostomy and PEG tube with associated abdominal pain, recent history of auditory and visual hallucinations, and with chronic sacral decubitus ulcer.    Source: [] Patient       [x] Medical Record        [] RN        [x] Family at bedside - daughter     [x] Other: assistant nurse manager, PA.     -If unable to interview patient: [x] Trach/Vent/BiPAP  [] Disoriented/confused/inappropriate to interview    Diet Order:   Diet, NPO with Tube Feed:   Tube Feeding Modality: Gastrostomy  Casie Farns Peptide 1.5  Total Volume for 24 Hours (mL): 960  Continuous  Starting Tube Feed Rate {mL per Hour}: 10  Increase Tube Feed Rate by (mL): 10     Every 4 hours  Until Goal Tube Feed Rate (mL per Hour): 40  Tube Feed Duration (in Hours): 24  Tube Feed Start Time: 13:00   Rate (mL per Hour): 10   Frequency: Every 2 Hours  Alfred(7 Gm Arginine/7 Gm Glut/1.2 Gm HMB     Qty per Day:  3  No Carb Prosource TF     Qty per Day:  1 (10-30-23)    EN order + prosource providing if 100% provision: 1517kcal and 81g protein    - Is current order appropriate/adequate? see below for recommendations    Nutrition-related concerns:  -Extensive conversation held with daughter. Per daughter, pt receiving water by mouth as well as small bites of puree meals. Daughter requesting SLP consult. Daughter providing pt with [employer] Peptide 1.5 tube feeding via PEG, varied amounts daily pending pt tolerance that day (20-50ml/hr varied). Sometimes pt will has lose stool vs. gas. Provided continuos water flush as daughter states it helps pt digest her tube feeding better. Pt is vegan, however consumes dairy products. Would not like Alfred sent as it contains beef collagen.   -Seen by GI. PEG loosed at bedside this admission. With PEG leaking, tube feeding rate dropped to 40ml and resolved.   -Free water ordered: 10ml every 2 hours.   -Hgb A1c 7.5%, DM2 level. on insulin regimen to maintain glycemic control. POCT Blood glucose being monitored. [employer] peptide 1.5 is carbohydrate controlled to aid in glycemic control. Comparable to Glucerna 1.5 (138g CHO vs. 133g CHO per Liter).   -Enteral synthroid noted.     GI: lose stool noted 10/31. Last BM  per flow sheets. Bowel Regimen? [] Yes   [x] No    Weights:   10/29:  reports pt UBW 90 pounds prior to illness  Dosing weight 54.6kg  Daily Weight in k.9 ()  RD will continue to monitor trends.   BMI using most recent weight and EMR height 147.3cm: 23.5    Nutritionally Pertinent MEDICATIONS  (STANDING):  ascorbic acid  calcium carbonate    500 mG (Tums) Chewable  cefepime   IVPB  dextrose 50% Injectable  insulin glargine Injectable (LANTUS)  insulin lispro (ADMELOG) corrective regimen sliding scale  levothyroxine  multivitamin/minerals/iron Oral Solution (CENTRUM)  pantoprazole   Suspension  predniSONE   Tablet  simethicone  zinc sulfate    Pertinent Labs:  @ 06:28: Na 135, BUN 28<H>, Cr <0.30<L>, <H>, K+ 4.0, Phos 3.4, Mg 2.0, Alk Phos 52, ALT/SGPT 24, AST/SGOT 21, HbA1c --    A1C with Estimated Average Glucose Result: 7.5 % (10-28-23 @ 07:18)    Finger Sticks:  POCT Blood Glucose.: 237 mg/dL ( @ 12:14)  POCT Blood Glucose.: 294 mg/dL ( @ 05:37)  POCT Blood Glucose.: 207 mg/dL (10-31 @ 23:40)  POCT Blood Glucose.: 145 mg/dL (10-31 @ 17:12)    Skin per nursing documentation: sacrum stage IV.   Edema per nursing documentation: 1+ left arm per flow sheets    Estimated Needs using most recent weight 50.9kg  27-32kcal/kg 1374-1628kcal/day  1.2-1.4g/kg 61-71g protein/day  defer free water to team    Previous Nutrition Diagnosis: Increased Nutrient Needs  Nutrition Diagnosis is: [x] ongoing  [] resolved [] not applicable     Nutrition Care Plan:  [x] In Progress  [] Achieved  [] Not applicable    New Nutrition Diagnosis: [x] Not applicable    Nutrition Interventions:  See conversation with daughter above and recommendaions below.     Recommendations:      -Daughter requesting 20 hour feeding regimen. Will also be better for synthroid administration. Consider 45ml/hr x 20 hours to provide a total volume of 900ml, 1385kcal (27kcal/kg), 66g protein (1.3g/kg) and 631ml free water. d/c prosource as pt receiving adequate protein via tube feeding.   -Defer free water flush to team. Family requesting water flushes in conjunction with tube feeding. Consider 30ml/hr every hour.   -Consider SLP consult per daughter request.    -Continue zinc per family request. will also aid in wound healing. monitor need to continue after 11/3 (7 days after start) to avoid med-nutrient interactions.   -Continue vitamin C and Multivitamin. will aid in wound healing. family requesting to also continue this.   -Daughter requesting to d/c Alfred, consider d/c per family request as it is not vegan.     Monitoring and Evaluation:   Continue to monitor nutritional intake, tolerance to diet prescription, weights, labs, skin integrity    RD remains available upon request and will follow up per protocol  Rufina Dominguez MS, RD, CDN / teams

## 2023-11-01 NOTE — CONSULT NOTE ADULT - NS ATTEND AMEND GEN_ALL_CORE FT
Pt seen and examined with ACP.  Assessment and plan reviewed and discussed.  Agree with above.    Status of wounds and treatment recommendations d/w  pt's daughter over the phone.    All questions answered.   Daughter  expressed understanding.    I spent 55  minutes face to face w/ this pt of which more than 50% of the time was spent counseling & coordinating care of this pt.
ENT consulted for itchy ear and blood in tracheostomy     Patient is a 73y old  Female who presents with a chief complaint of 74 y/o F with PMHx of T2DM, HTN, HLD, anemia, hypothyroidism, RA, fibromyalgia, remote cerebral aneurysm repair, acute hypercapnic respiratory failure now with tracheostomy, PEG tube, and bedbound (trach change 8/18, PEG change 8/14), sacral pressure ulcer, BIBEMS from home for 6 day hx of bleeding from the tracheostomy and PEG tube with associated abdominal pain, recent history of auditory and visual hallucinations, and with chronic sacral decubitus ulcer.     Pt daughter at bedside, states she has had small amount of blood tinged sputum for the past few days and blood from peg tube. Pt on chronic vent, small amounts of blood on drain sponge and occasional nose bleeds per daughter. Pt also complaining of Right ear itchiness and pain. Pt denies any changes in n/v, tinnitus, dizziness, ear pain, congestion, recent URI, otorrhea, hearing loss, hx of sx or trauma or recent travel.     Physical exam shows b/la ears clear with small flakes of  cerumen no occluding EAC. #8 shiley LPC trach in neck with small amount of blood tinges secretions in inner cannula, no active bleed or ooze from stoma or on tracheoscopy. Kath and b/l bronchi visualized without any trauma. Small amount of blood tinges secretions resting at beginning of right bronchus not actively bleeding.    Recommend:  Blood in Trach  - continue with trach care   - clean or change inner cannula daily   - respiratory care to continue with vent settings   - if continue consider bronch for lower eval    Itchy Ear  - Avoid Q-tips or manipulation   - no further ent intervention at this time

## 2023-11-01 NOTE — CONSULT NOTE ADULT - PROBLEM SELECTOR RECOMMENDATION 2
- 5 drops of mineral oil to b/l ears  - no further ent intervention at this time - Avoid Q-tips or manipulation   - no further ent intervention at this time

## 2023-11-01 NOTE — PROGRESS NOTE ADULT - PROBLEM SELECTOR PLAN 1
Found w/ Bilateral PNA vs Atelectasis, and Possible OM Sacrum   S/p Fevers at home, Afebrile since admission   - S/p Chest CT (10/28):  c/w Bilateral PNA vs Atelectasis   - Started Empirically on Vanco, Aztreonam   - Blood cx: NGTD   - urine culture: negative   - Rvp: negative; Sputum cx pending    - Urine Legionella: pending   - Airway Clearance: Duoneb, Hypersal, Chest PT, PRN suctioning   - Maintain 02 sat > 92% Found w/ Bilateral PNA vs Atelectasis, and Possible OM Sacrum   S/p Fevers at home, Afebrile since admission   - S/p Chest CT (10/28):  c/w Bilateral PNA vs Atelectasis   - Started Empirically on Vanco, Aztreonam   - Blood cx: NGTD   - urine culture: negative   - Rvp: negative; Sputum cx pending    - Sputum cx + Pseudomonas aeruginosa, S. aureus, mixed GNR (10/28)  - Urine Legionella: pending   - Airway Clearance: Duoneb, Hypersal, Chest PT, PRN suctioning   - Maintain 02 sat > 92%

## 2023-11-01 NOTE — CONSULT NOTE ADULT - SUBJECTIVE AND OBJECTIVE BOX
CC: trach eval     HPI:  Patient is a 73y old  Female who presents with a chief complaint of 74 y/o F with PMHx of T2DM, HTN, HLD, anemia, hypothyroidism, RA, fibromyalgia, remote cerebral aneurysm repair, acute hypercapnic respiratory failure now with tracheostomy, PEG tube, and bedbound (trach change 8/18, PEG change 8/14), sacral pressure ulcer, BIBEMS from home for 6 day hx of bleeding from the tracheostomy and PEG tube with associated abdominal pain, recent history of auditory and visual hallucinations, and with chronic sacral decubitus ulcer. ENT called for trach eval after bloody secretions suctioned from trach tube. Pt daughter at bedside, states she has had small amount of blood tinged sputum for the past few days and blood from peg tube. Pt on chronic vent, small amounts of blood on drain sponge and occasional nose bleeds per daughter. Pt also complaining of Right ear itchiness and pain. Pt denies any changes in n/v, tinnitus, dizziness, ear pain, congestion, recent URI, otorrhea, hearing loss, hx of sx or trauma or recent travel.     PAST MEDICAL & SURGICAL HISTORY:  Diabetes      Rheumatoid arthritis      Fibromyalgia      Hypothyroid      Hypertension      H/O tracheostomy      PEG (percutaneous endoscopic gastrostomy) status        Allergies    penicillin (Unknown)  heparin (Unknown)  Tagamet (Unknown)  pineapple (Unknown)  walnut (Unknown)  Pecan, Filbert, Hazelnut (Unknown)  Lyrica (Unknown)    Intolerances      MEDICATIONS  (STANDING):  acetylcysteine 10%  Inhalation 4 milliLiter(s) Inhalation every 6 hours  albuterol/ipratropium for Nebulization 3 milliLiter(s) Nebulizer every 6 hours  artificial  tears Solution 2 Drop(s) Both EYES every 6 hours  ascorbic acid 500 milliGRAM(s) Oral daily  calcium carbonate    500 mG (Tums) Chewable 1 Tablet(s) Chew every 12 hours  cefepime   IVPB 2000 milliGRAM(s) IV Intermittent every 8 hours  chlorhexidine 0.12% Liquid 15 milliLiter(s) Oral Mucosa every 12 hours  chlorhexidine 4% Liquid 1 Application(s) Topical <User Schedule>  dextrose 50% Injectable 50 milliLiter(s) IV Push once  enoxaparin Injectable 40 milliGRAM(s) SubCutaneous every 24 hours  escitalopram 10 milliGRAM(s) Oral daily  fentaNYL   Patch  25 MICROgram(s)/Hr 1 Patch Transdermal every 72 hours  gabapentin Solution 100 milliGRAM(s) Enteral Tube at bedtime  insulin glargine Injectable (LANTUS) 14 Unit(s) SubCutaneous every morning  insulin lispro (ADMELOG) corrective regimen sliding scale   SubCutaneous every 6 hours  lactobacillus acidophilus 1 Tablet(s) Oral daily  levothyroxine 100 MICROGram(s) Enteral Tube daily  lidocaine   4% Patch 1 Patch Transdermal daily  multivitamin/minerals/iron Oral Solution (CENTRUM) 15 milliLiter(s) Oral daily  oxybutynin 5 milliGRAM(s) Oral daily  oxycodone    5 mG/acetaminophen 325 mG 1 Tablet(s) Oral every 6 hours  pantoprazole   Suspension 40 milliGRAM(s) Enteral Tube daily  predniSONE   Tablet 5 milliGRAM(s) Oral daily  QUEtiapine 12.5 milliGRAM(s) Oral at bedtime  simethicone 80 milliGRAM(s) Chew four times a day  zinc sulfate 220 milliGRAM(s) Oral daily    MEDICATIONS  (PRN):  acetaminophen   Oral Liquid .. 650 milliGRAM(s) Oral every 6 hours PRN Temp greater or equal to 38C (100.4F), Moderate Pain (4 - 6)  sodium chloride 0.65% Nasal 2 Spray(s) Both Nostrils two times a day PRN Nasal Congestion      Social History: no tobacco, no etoh     Family history: Pt denies any sign FHx    ROS:   ENT: all negative except as noted in HPI   CV: denies palpitations  Pulm: denies SOB, cough, hemoptysis  GI: denies change in apetite, indigestion, n/v  : denies pertinent urinary symptoms, urgency  Neuro: denies numbness/tingling, loss of sensation  Psych: denies anxiety  MS: denies muscle weakness, instability  Heme: denies easy bruising or bleeding  Endo: denies heat/cold intolerance, excessive sweating  Vascular: denies LE edema    Vital Signs Last 24 Hrs  T(C): 37.5 (01 Nov 2023 12:18), Max: 37.5 (01 Nov 2023 12:18)  T(F): 99.5 (01 Nov 2023 12:18), Max: 99.5 (01 Nov 2023 12:18)  HR: 90 (01 Nov 2023 12:18) (63 - 90)  BP: 139/60 (01 Nov 2023 12:18) (123/58 - 139/60)  BP(mean): --  RR: 20 (01 Nov 2023 12:18) (13 - 20)  SpO2: 100% (01 Nov 2023 12:18) (97% - 100%)    Parameters below as of 01 Nov 2023 12:18  Patient On (Oxygen Delivery Method): ventilator                              8.5    7.99  )-----------( 130      ( 01 Nov 2023 06:28 )             29.3    11-01    135  |  101  |  28<H>  ----------------------------<  247<H>  4.0   |  25  |  <0.30<L>    Ca    8.7      01 Nov 2023 06:28  Phos  3.4     11-01  Mg     2.0     11-01    TPro  6.3  /  Alb  2.7<L>  /  TBili  0.2  /  DBili  x   /  AST  21  /  ALT  24  /  AlkPhos  52  11-01       PHYSICAL EXAM:  Gen: NAD  Skin: No rashes, bruises, or lesions  Head: Normocephalic, Atraumatic  Face: no edema, erythema, or fluctuance. Parotid glands soft without mass  Eyes: no scleral injection  Ears: Right - ear canal clear with small amount of cerumen present not blocking EAC, TM intact without effusion or erythema. No evidence of any fluid drainage. No mastoid tenderness, erythema, or ear bulging            Left - ear canal clear with small amount of cerumen present not blocking EAC, TM intact without effusion or erythema. No evidence of any fluid drainage. No mastoid tenderness, erythema, or ear bulging  Nose: Nares bilaterally patent, no discharge  Mouth: No Stridor / Drooling / Trismus.  Mucosa moist, tongue/uvula midline, oropharynx clear  Neck: #[id number] LPC in place without any active drainage from stoma, small amount of residual blood tinges secretions in inner cannula, supple, no lymphadenopathy, trachea midline, no masses  Lymphatic: No lymphadenopathy  Resp: breathing easily, no stridor  CV: no peripheral edema/cyanosis  GI: nondistended   Peripheral vascular: no JVD or edema  Neuro: facial nerve intact, no facial droop        Fiberoptic Indirect laryngoscopy:  (Scope #2 used) Reason for Laryngoscopy:    Patient was unable to cooperate with mirror.  Nasopharynx, oropharynx, and hypopharynx clear, no bleeding. Tongue base, posterior pharyngeal wall, vallecula, epiglottis, and subglottis appear normal. No erythema, edema, pooling of secretions, masses or lesions. Airway patent, no foreign body visualized. No glottic/supraglottic edema. True vocal cords, arytenoids, vestibular folds, ventricles, pyriform sinuses, and aryepiglottic folds appear normal bilaterally. Vocal cords mobile with good contact b/l.      tracheoscopy: stoma, not active bleeding, no current drainage.  Kath and b/l bronchi noted, clear trachea, no source of trauma noted. residual tinge of blood noted in right bronchus

## 2023-11-01 NOTE — PROGRESS NOTE ADULT - PROBLEM SELECTOR PLAN 6
-Continue home Prednisone regimen  *fibromyalgia  -continue home Gabapentin 100mg daily  -continue home Fentanyl patches -Continue home Prednisone regimen  *fibromyalgia  -continue home Gabapentin 100mg daily  -continue home Fentanyl patches  - added Lidocaine patch   - added Percocet q6 hrs (in addition to tylenol PRN)

## 2023-11-01 NOTE — PROGRESS NOTE ADULT - PROBLEM SELECTOR PLAN 3
Chronic Trach/ Vent dependant 2/2 Hypercapnic Resp Failure since 10/2022   -S/p Trach # 8 Cuffed Flex Shiley; trach change 8/18, PEG change 8/14 per records   -Continue Home vent settings: (300 TV/ RR 12/5 Peep/ Fio2 30%)  -Tolerates intermittent PSV 15/5 during day/ Vent HS  - Airway Clearance: Duoneb, Hypersal, Chest PT, PRN suctioning   - Maintain 02 sat > 92%    Tracheal Bleeding (Intermittent):   -S/p CT Angio neck: No evidence of active bleeding around tracheostomy site. No hematoma.  No collection   - Advise RN staff to use Red rubber trach catheters to avoid suction trauma   - Will consider ENT evaluation for scope If persist Chronic Trach/ Vent dependant 2/2 Hypercapnic Resp Failure since 10/2022   -S/p Trach # 8 Cuffed Flex Shiley; trach change 8/18, PEG change 8/14 per records   - Continue Home vent settings: (300 TV/ RR 12/5 Peep/ Fio2 30%)  - Tolerates intermittent PSV 15/5 during day/ Vent HS  - Airway Clearance: Duoneb, Hypersal, Chest PT, PRN suctioning   - Maintain 02 sat > 92%    Tracheal Bleeding (Intermittent):   -S/p CT Angio neck: No evidence of active bleeding around tracheostomy site. No hematoma.  No collection   - Advise RN staff to use Red rubber trach catheters to avoid suction trauma   - Will consider ENT evaluation for scope If persist

## 2023-11-02 LAB
GLUCOSE BLDC GLUCOMTR-MCNC: 136 MG/DL — HIGH (ref 70–99)
GLUCOSE BLDC GLUCOMTR-MCNC: 136 MG/DL — HIGH (ref 70–99)
GLUCOSE BLDC GLUCOMTR-MCNC: 156 MG/DL — HIGH (ref 70–99)
GLUCOSE BLDC GLUCOMTR-MCNC: 156 MG/DL — HIGH (ref 70–99)
GLUCOSE BLDC GLUCOMTR-MCNC: 170 MG/DL — HIGH (ref 70–99)
GLUCOSE BLDC GLUCOMTR-MCNC: 170 MG/DL — HIGH (ref 70–99)
GLUCOSE BLDC GLUCOMTR-MCNC: 215 MG/DL — HIGH (ref 70–99)
GLUCOSE BLDC GLUCOMTR-MCNC: 215 MG/DL — HIGH (ref 70–99)
GLUCOSE BLDC GLUCOMTR-MCNC: 279 MG/DL — HIGH (ref 70–99)
GLUCOSE BLDC GLUCOMTR-MCNC: 279 MG/DL — HIGH (ref 70–99)

## 2023-11-02 PROCEDURE — 99233 SBSQ HOSP IP/OBS HIGH 50: CPT

## 2023-11-02 RX ORDER — QUETIAPINE FUMARATE 200 MG/1
25 TABLET, FILM COATED ORAL ONCE
Refills: 0 | Status: COMPLETED | OUTPATIENT
Start: 2023-11-02 | End: 2023-11-02

## 2023-11-02 RX ADMIN — Medication 2 DROP(S): at 00:29

## 2023-11-02 RX ADMIN — CHLORHEXIDINE GLUCONATE 1 APPLICATION(S): 213 SOLUTION TOPICAL at 05:57

## 2023-11-02 RX ADMIN — Medication 2: at 00:29

## 2023-11-02 RX ADMIN — CHLORHEXIDINE GLUCONATE 15 MILLILITER(S): 213 SOLUTION TOPICAL at 17:10

## 2023-11-02 RX ADMIN — LIDOCAINE 1 PATCH: 4 CREAM TOPICAL at 23:15

## 2023-11-02 RX ADMIN — Medication 5 MILLIGRAM(S): at 11:27

## 2023-11-02 RX ADMIN — Medication 650 MILLIGRAM(S): at 21:48

## 2023-11-02 RX ADMIN — ENOXAPARIN SODIUM 40 MILLIGRAM(S): 100 INJECTION SUBCUTANEOUS at 05:54

## 2023-11-02 RX ADMIN — QUETIAPINE FUMARATE 25 MILLIGRAM(S): 200 TABLET, FILM COATED ORAL at 11:25

## 2023-11-02 RX ADMIN — SIMETHICONE 80 MILLIGRAM(S): 80 TABLET, CHEWABLE ORAL at 05:55

## 2023-11-02 RX ADMIN — ZINC SULFATE TAB 220 MG (50 MG ZINC EQUIVALENT) 220 MILLIGRAM(S): 220 (50 ZN) TAB at 11:26

## 2023-11-02 RX ADMIN — Medication 2 DROP(S): at 05:55

## 2023-11-02 RX ADMIN — Medication 2 DROP(S): at 17:11

## 2023-11-02 RX ADMIN — Medication 100 MICROGRAM(S): at 05:56

## 2023-11-02 RX ADMIN — ESCITALOPRAM OXALATE 10 MILLIGRAM(S): 10 TABLET, FILM COATED ORAL at 11:26

## 2023-11-02 RX ADMIN — QUETIAPINE FUMARATE 12.5 MILLIGRAM(S): 200 TABLET, FILM COATED ORAL at 21:02

## 2023-11-02 RX ADMIN — LIDOCAINE 1 PATCH: 4 CREAM TOPICAL at 11:25

## 2023-11-02 RX ADMIN — Medication 15 MILLILITER(S): at 11:25

## 2023-11-02 RX ADMIN — Medication 1 TABLET(S): at 05:55

## 2023-11-02 RX ADMIN — CHLORHEXIDINE GLUCONATE 15 MILLILITER(S): 213 SOLUTION TOPICAL at 05:55

## 2023-11-02 RX ADMIN — Medication 5 MILLIGRAM(S): at 05:55

## 2023-11-02 RX ADMIN — LIDOCAINE 1 PATCH: 4 CREAM TOPICAL at 19:33

## 2023-11-02 RX ADMIN — Medication 650 MILLIGRAM(S): at 12:36

## 2023-11-02 RX ADMIN — GABAPENTIN 100 MILLIGRAM(S): 400 CAPSULE ORAL at 21:02

## 2023-11-02 RX ADMIN — Medication 1 TABLET(S): at 17:10

## 2023-11-02 RX ADMIN — Medication 650 MILLIGRAM(S): at 20:48

## 2023-11-02 RX ADMIN — Medication 1 TABLET(S): at 11:25

## 2023-11-02 RX ADMIN — SIMETHICONE 80 MILLIGRAM(S): 80 TABLET, CHEWABLE ORAL at 17:10

## 2023-11-02 RX ADMIN — Medication 650 MILLIGRAM(S): at 11:24

## 2023-11-02 RX ADMIN — SIMETHICONE 80 MILLIGRAM(S): 80 TABLET, CHEWABLE ORAL at 00:36

## 2023-11-02 RX ADMIN — Medication 2: at 17:39

## 2023-11-02 RX ADMIN — Medication 500 MILLIGRAM(S): at 11:27

## 2023-11-02 RX ADMIN — Medication 6: at 11:56

## 2023-11-02 RX ADMIN — SIMETHICONE 80 MILLIGRAM(S): 80 TABLET, CHEWABLE ORAL at 11:27

## 2023-11-02 RX ADMIN — PANTOPRAZOLE SODIUM 40 MILLIGRAM(S): 20 TABLET, DELAYED RELEASE ORAL at 11:26

## 2023-11-02 RX ADMIN — CEFEPIME 100 MILLIGRAM(S): 1 INJECTION, POWDER, FOR SOLUTION INTRAMUSCULAR; INTRAVENOUS at 21:01

## 2023-11-02 RX ADMIN — CEFEPIME 100 MILLIGRAM(S): 1 INJECTION, POWDER, FOR SOLUTION INTRAMUSCULAR; INTRAVENOUS at 05:57

## 2023-11-02 RX ADMIN — Medication 2 DROP(S): at 11:55

## 2023-11-02 RX ADMIN — CEFEPIME 100 MILLIGRAM(S): 1 INJECTION, POWDER, FOR SOLUTION INTRAMUSCULAR; INTRAVENOUS at 13:49

## 2023-11-02 RX ADMIN — INSULIN GLARGINE 14 UNIT(S): 100 INJECTION, SOLUTION SUBCUTANEOUS at 09:15

## 2023-11-02 NOTE — PROGRESS NOTE ADULT - PROBLEM SELECTOR PLAN 3
no Chronic Trach/ Vent dependant 2/2 Hypercapnic Resp Failure since 10/2022   -S/p Trach # 8 Cuffed Flex Shiley; trach change 8/18, PEG change 8/14 per records   - Continue Home vent settings: (300 TV/ RR 12/5 Peep/ Fio2 30%)  - Tolerates intermittent PSV 15/5 during day/ Vent HS  - Airway Clearance: Duoneb, Hypersal, Chest PT, PRN suctioning   - Maintain 02 sat > 92%    Tracheal Bleeding (Intermittent):   -S/p CT Angio neck: No evidence of active bleeding around tracheostomy site. No hematoma.  No collection   - Advise RN staff to use Red rubber trach catheters to avoid suction trauma   - Will consider ENT evaluation for scope If persist Chronic Trach/ Vent dependant 2/2 Hypercapnic Resp Failure since 10/2022   -S/p Trach # 8 Cuffed Flex Shiley; trach change 8/18, PEG change 8/14 per records   - Continue Home vent settings: (300 TV/ RR 12/5 Peep/ Fio2 30%)  - Tolerates intermittent PSV 15/5 during day/ Vent HS  - Airway Clearance: Duoneb, Hypersal, Chest PT, PRN suctioning   - Maintain 02 sat > 92%    Tracheal Bleeding (Intermittent):   -S/p CT Angio neck: No evidence of active bleeding around tracheostomy site. No hematoma.  No collection   - Advise RN staff to use Red rubber trach catheters to avoid suction trauma   - Seen by ENT; Kath and b/l bronchi visualized without any trauma. small amount of blood tinged secretions resting at beginning of right bronchus not actively bleeding.

## 2023-11-02 NOTE — PROGRESS NOTE ADULT - ASSESSMENT
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 Bleeding from gastrostomy. Site appears macerated. Multifactorial, possible the PEG has been too tight at home.  -PEG loosened at bedside  -no further bleeding  -wound care following    2. Leakage at PEG site.   -no further leaking     3. Abdominal pain. CT negative for acute pathology.  -rec miralax BID    4. Respiratory   per RCU        Advanced care planning forms were discussed. Code status including forceful chest compressions, defibrillation and intubation were discussed. The risks benefits and alternatives to pertinent gastrointestinal procedures and interventions were discussed in detail and all questions were answered. Duration: 15 Minutes.    [facility]  Andrew Morgan M.D.   [address] [place] [address]  Office: [phone]

## 2023-11-02 NOTE — PROGRESS NOTE ADULT - SUBJECTIVE AND OBJECTIVE BOX
Patient is a 73y old  Female who presents with a chief complaint of 72 y/o F with PMHx of T2DM, HTN, HLD, anemia, hypothyroidism, RA, fibromyalgia, remote cerebral aneurysm repair, acute hypercapnic respiratory failure now with tracheostomy, PEG tube, and bedbound (trach change 8/18, PEG change 8/14), sacral pressure ulcer, BIBEMS from home for 6 day hx of bleeding from the tracheostomy and PEG tube with associated abdominal pain, recent history of auditory and visual hallucinations, and with chronic sacral decubitus ulcer.    (29 Oct 2023 14:01)      Interval Events:    REVIEW OF SYSTEMS:  [ ] Positive  [ ] All other systems negative  [ ] Unable to assess ROS because ________    Vital Signs Last 24 Hrs  T(C): 36.6 (11-02-23 @ 04:38), Max: 37.5 (11-01-23 @ 12:18)  T(F): 97.9 (11-02-23 @ 04:38), Max: 99.5 (11-01-23 @ 12:18)  HR: 81 (11-02-23 @ 04:38) (70 - 90)  BP: 122/57 (11-02-23 @ 04:38) (122/57 - 143/75)  RR: 20 (11-02-23 @ 04:38) (20 - 22)  SpO2: 96% (11-02-23 @ 04:38) (96% - 100%)    PHYSICAL EXAM:  HEENT:   [ ]Tracheostomy:  [ ]Pupils equal  [ ]No oral lesions  [ ]Abnormal    SKIN  [ ] No Rash  [ ] Abnormal  [ ] pressure    CARDIAC  [ ]Regular  [ ]Abnormal    PULMONARY  [ ]Bilateral Clear Breath Sounds  [ ]Normal Excursion  [ ]Abnormal    GI  [ ]PEG      [ ] +BS		              [ ]Soft, nondistended, nontender	  [ ]Abnormal    MUSCULOSKELETAL                                   [ ]Bedbound                 [ ]Abnormal    [ ]Ambulatory/OOB to chair                           EXTREMITIES                                         [ ]Normal  [ ]Edema                           NEUROLOGIC  [ ] Normal, non focal  [ ] Focal findings:    PSYCHIATRIC  [ ]Alert and appropriate  [ ] Sedated	 [ ]Agitated    :  Mcbride: [ ] Yes, if yes: Date of Placement:                   [  ] No    LINES: Central Lines [ ] Yes, if yes: Date of Placement                                     [  ] No    HOSPITAL MEDICATIONS:  MEDICATIONS  (STANDING):  artificial  tears Solution 2 Drop(s) Both EYES every 6 hours  ascorbic acid 500 milliGRAM(s) Oral daily  calcium carbonate    500 mG (Tums) Chewable 1 Tablet(s) Chew every 12 hours  cefepime   IVPB 2000 milliGRAM(s) IV Intermittent every 8 hours  chlorhexidine 0.12% Liquid 15 milliLiter(s) Oral Mucosa every 12 hours  chlorhexidine 4% Liquid 1 Application(s) Topical <User Schedule>  dextrose 50% Injectable 50 milliLiter(s) IV Push once  enoxaparin Injectable 40 milliGRAM(s) SubCutaneous every 24 hours  escitalopram 10 milliGRAM(s) Oral daily  fentaNYL   Patch  25 MICROgram(s)/Hr 1 Patch Transdermal every 72 hours  gabapentin Solution 100 milliGRAM(s) Enteral Tube at bedtime  insulin glargine Injectable (LANTUS) 14 Unit(s) SubCutaneous every morning  insulin lispro (ADMELOG) corrective regimen sliding scale   SubCutaneous every 6 hours  lactobacillus acidophilus 1 Tablet(s) Oral daily  levothyroxine 100 MICROGram(s) Enteral Tube daily  lidocaine   4% Patch 1 Patch Transdermal daily  multivitamin/minerals/iron Oral Solution (CENTRUM) 15 milliLiter(s) Oral daily  oxybutynin 5 milliGRAM(s) Oral daily  oxycodone    5 mG/acetaminophen 325 mG 1 Tablet(s) Oral every 6 hours  pantoprazole   Suspension 40 milliGRAM(s) Enteral Tube daily  predniSONE   Tablet 5 milliGRAM(s) Oral daily  QUEtiapine 12.5 milliGRAM(s) Oral at bedtime  simethicone 80 milliGRAM(s) Chew four times a day  zinc sulfate 220 milliGRAM(s) Oral daily    MEDICATIONS  (PRN):  acetaminophen   Oral Liquid .. 650 milliGRAM(s) Oral every 6 hours PRN Temp greater or equal to 38C (100.4F), Moderate Pain (4 - 6)  albuterol/ipratropium for Nebulization 3 milliLiter(s) Nebulizer every 6 hours PRN Bronchospasm  sodium chloride 0.65% Nasal 2 Spray(s) Both Nostrils two times a day PRN Nasal Congestion      LABS:                        8.5    7.99  )-----------( 130      ( 01 Nov 2023 06:28 )             29.3     11-01    135  |  101  |  28<H>  ----------------------------<  247<H>  4.0   |  25  |  <0.30<L>    Ca    8.7      01 Nov 2023 06:28  Phos  3.4     11-01  Mg     2.0     11-01    TPro  6.3  /  Alb  2.7<L>  /  TBili  0.2  /  DBili  x   /  AST  21  /  ALT  24  /  AlkPhos  52  11-01    Urinalysis Basic - ( 01 Nov 2023 06:28 )    Color: x / Appearance: x / SG: x / pH: x  Gluc: 247 mg/dL / Ketone: x  / Bili: x / Urobili: x   Blood: x / Protein: x / Nitrite: x   Leuk Esterase: x / RBC: x / WBC x   Sq Epi: x / Non Sq Epi: x / Bacteria: x      Mode: AC/ CMV (Assist Control/ Continuous Mandatory Ventilation)  RR (machine): 12  TV (machine): 350  FiO2: 30  PEEP: 5  ITime: 1  MAP: 8  PIP: 25   Patient is a 73y old  Female who presents with a chief complaint of 72 y/o F with PMHx of T2DM, HTN, HLD, anemia, hypothyroidism, RA, fibromyalgia, remote cerebral aneurysm repair, acute hypercapnic respiratory failure now with tracheostomy, PEG tube, and bedbound (trach change 8/18, PEG change 8/14), sacral pressure ulcer, BIBEMS from home for 6 day hx of bleeding from the tracheostomy and PEG tube with associated abdominal pain, recent history of auditory and visual hallucinations, and with chronic sacral decubitus ulcer.    (29 Oct 2023 14:01)    Interval Events: No issues overnight    REVIEW OF SYSTEMS:  [ ] Positive  [ ] All other systems negative  [x ] Unable to assess ROS because patient is NON-verbal    Vital Signs Last 24 Hrs  T(C): 36.6 (11-02-23 @ 04:38), Max: 37.5 (11-01-23 @ 12:18)  T(F): 97.9 (11-02-23 @ 04:38), Max: 99.5 (11-01-23 @ 12:18)  HR: 81 (11-02-23 @ 04:38) (70 - 90)  BP: 122/57 (11-02-23 @ 04:38) (122/57 - 143/75)  RR: 20 (11-02-23 @ 04:38) (20 - 22)  SpO2: 96% (11-02-23 @ 04:38) (96% - 100%)    PHYSICAL EXAM:  HEENT:   [ X ]Tracheostomy: #8 cuffed shiley  [ X ]Pupils equal  [ ]No oral lesions  [ ]Abnormal    SKIN  [ X ] No Rash  [ X ] Abnormal - sacral wound  [ ] pressure    CARDIAC  [ X ]Regular  [ ]Abnormal    PULMONARY  [ ] Bilateral Clear Breath Sounds  [ ]Normal Excursion  [ X ] Abnormal - R sided rhonchi, diminished BS at b/l bases    GI  [X] PEG      [ X] +BS		              [X] Soft, nondistended, nontender	  [ ]Abnormal    MUSCULOSKELETAL                                   [X] Bedbound                 [ ]Abnormal    [ ]Ambulatory/OOB to chair                           EXTREMITIES                                         [X]Normal  [ ]Edema                           NEUROLOGIC  [X] Normal, non focal  [ ] Focal findings:    PSYCHIATRIC  [X]Alert and appropriate  [ ] Sedated	 [ ]Agitated    :  Mcbride: [ ] Yes, if yes: Date of Placement:                   [X] No    LINES: Central Lines [ ] Yes, if yes: Date of Placement                                     [X] No    HOSPITAL MEDICATIONS:  MEDICATIONS  (STANDING):  artificial  tears Solution 2 Drop(s) Both EYES every 6 hours  ascorbic acid 500 milliGRAM(s) Oral daily  calcium carbonate    500 mG (Tums) Chewable 1 Tablet(s) Chew every 12 hours  cefepime   IVPB 2000 milliGRAM(s) IV Intermittent every 8 hours  chlorhexidine 0.12% Liquid 15 milliLiter(s) Oral Mucosa every 12 hours  chlorhexidine 4% Liquid 1 Application(s) Topical <User Schedule>  dextrose 50% Injectable 50 milliLiter(s) IV Push once  enoxaparin Injectable 40 milliGRAM(s) SubCutaneous every 24 hours  escitalopram 10 milliGRAM(s) Oral daily  fentaNYL   Patch  25 MICROgram(s)/Hr 1 Patch Transdermal every 72 hours  gabapentin Solution 100 milliGRAM(s) Enteral Tube at bedtime  insulin glargine Injectable (LANTUS) 14 Unit(s) SubCutaneous every morning  insulin lispro (ADMELOG) corrective regimen sliding scale   SubCutaneous every 6 hours  lactobacillus acidophilus 1 Tablet(s) Oral daily  levothyroxine 100 MICROGram(s) Enteral Tube daily  lidocaine   4% Patch 1 Patch Transdermal daily  multivitamin/minerals/iron Oral Solution (CENTRUM) 15 milliLiter(s) Oral daily  oxybutynin 5 milliGRAM(s) Oral daily  oxycodone    5 mG/acetaminophen 325 mG 1 Tablet(s) Oral every 6 hours  pantoprazole   Suspension 40 milliGRAM(s) Enteral Tube daily  predniSONE   Tablet 5 milliGRAM(s) Oral daily  QUEtiapine 12.5 milliGRAM(s) Oral at bedtime  simethicone 80 milliGRAM(s) Chew four times a day  zinc sulfate 220 milliGRAM(s) Oral daily    MEDICATIONS  (PRN):  acetaminophen   Oral Liquid .. 650 milliGRAM(s) Oral every 6 hours PRN Temp greater or equal to 38C (100.4F), Moderate Pain (4 - 6)  albuterol/ipratropium for Nebulization 3 milliLiter(s) Nebulizer every 6 hours PRN Bronchospasm  sodium chloride 0.65% Nasal 2 Spray(s) Both Nostrils two times a day PRN Nasal Congestion      LABS:                        8.5    7.99  )-----------( 130      ( 01 Nov 2023 06:28 )             29.3     11-01    135  |  101  |  28<H>  ----------------------------<  247<H>  4.0   |  25  |  <0.30<L>    Ca    8.7      01 Nov 2023 06:28  Phos  3.4     11-01  Mg     2.0     11-01    TPro  6.3  /  Alb  2.7<L>  /  TBili  0.2  /  DBili  x   /  AST  21  /  ALT  24  /  AlkPhos  52  11-01    Urinalysis Basic - ( 01 Nov 2023 06:28 )    Color: x / Appearance: x / SG: x / pH: x  Gluc: 247 mg/dL / Ketone: x  / Bili: x / Urobili: x   Blood: x / Protein: x / Nitrite: x   Leuk Esterase: x / RBC: x / WBC x   Sq Epi: x / Non Sq Epi: x / Bacteria: x      Mode: AC/ CMV (Assist Control/ Continuous Mandatory Ventilation)  RR (machine): 12  TV (machine): 350  FiO2: 30  PEEP: 5  ITime: 1  MAP: 8  PIP: 25

## 2023-11-02 NOTE — PROGRESS NOTE ADULT - SUBJECTIVE AND OBJECTIVE BOX
Patient is a 73y old  Female who presents with a chief complaint of     HPI:  11/2/2023    Patient is at baseline mental state. On [facility] vent. Afebrile, no distress noted.    PAST MEDICAL & SURGICAL HISTORY:  Diabetes      Rheumatoid arthritis      Fibromyalgia      Hypothyroid      Hypertension      H/O tracheostomy      PEG (percutaneous endoscopic gastrostomy) status          ALLERY AND IMMUNOLOGIC: No hives or eczema    Allergies    penicillin (Unknown)  heparin (Unknown)  Tagamet (Unknown)  pineapple (Unknown)  walnut (Unknown)  Pecan, Filbert, Hazelnut (Unknown)  Lyrica (Unknown)    Intolerances        Social History:     FAMILY HISTORY:      MEDICATIONS  (STANDING):  acetaminophen   IVPB .. 1000 milliGRAM(s) IV Intermittent once  acetylcysteine 10%  Inhalation 4 milliLiter(s) Inhalation once  albuterol/ipratropium for Nebulization 3 milliLiter(s) Nebulizer every 6 hours  artificial  tears Solution 2 Drop(s) Both EYES every 6 hours  aztreonam  IVPB      aztreonam  IVPB 1000 milliGRAM(s) IV Intermittent every 8 hours  calcium carbonate    500 mG (Tums) Chewable 1 Tablet(s) Chew every 12 hours  chlorhexidine 0.12% Liquid 15 milliLiter(s) Oral Mucosa every 12 hours  chlorhexidine 4% Liquid 1 Application(s) Topical <User Schedule>  dextrose 50% Injectable 50 milliLiter(s) IV Push once  escitalopram 10 milliGRAM(s) Oral daily  fentaNYL   Patch  25 MICROgram(s)/Hr 1 Patch Transdermal every 72 hours  gabapentin Solution 100 milliGRAM(s) Enteral Tube at bedtime  insulin lispro (ADMELOG) corrective regimen sliding scale   SubCutaneous every 6 hours  levothyroxine 100 MICROGram(s) Enteral Tube daily  multivitamin/minerals/iron Oral Solution (CENTRUM) 15 milliLiter(s) Oral daily  pantoprazole   Suspension 40 milliGRAM(s) Enteral Tube daily  predniSONE   Tablet 5 milliGRAM(s) Oral daily  QUEtiapine 12.5 milliGRAM(s) Oral at bedtime  simethicone 80 milliGRAM(s) Chew four times a day  sodium chloride 3%  Inhalation 4 milliLiter(s) Inhalation every 6 hours  vancomycin  IVPB 1000 milliGRAM(s) IV Intermittent every 12 hours  zinc sulfate 220 milliGRAM(s) Oral daily    MEDICATIONS  (PRN):  sodium chloride 0.65% Nasal 2 Spray(s) Both Nostrils two times a day PRN Nasal Congestion          I&O's Summary    27 Oct 2023 07:01  -  28 Oct 2023 07:00  --------------------------------------------------------  IN: 200 mL / OUT: 400 mL / NET: -200 mL    28 Oct 2023 07:01  -  28 Oct 2023 19:00  --------------------------------------------------------  IN: 710 mL / OUT: 0 mL / NET: 710 mL        PHYSICAL EXAM:  Vital Signs Last 24 Hrs  T(C): 36.7 (02 Nov 2023 12:11), Max: 37.3 (01 Nov 2023 21:19)  T(F): 98 (02 Nov 2023 12:11), Max: 99.1 (01 Nov 2023 21:19)  HR: 75 (02 Nov 2023 15:32) (71 - 81)  BP: 132/71 (02 Nov 2023 12:11) (122/57 - 143/75)  BP(mean): --  RR: 20 (02 Nov 2023 12:11) (20 - 22)  SpO2: 98% (02 Nov 2023 15:32) (96% - 100%)    Parameters below as of 02 Nov 2023 12:11  Patient On (Oxygen Delivery Method): ventilator      GENERAL: NAD, well-developed  HEAD:  Atraumatic, Normocephalic  EYES: EOMI, PERRLA, conjunctiva and sclera clear  NECK: Supple, No JVD. Trach in place, connected to mech vent  CHEST/LUNG: Clear to auscultation bilaterally; No wheeze  HEART: Regular rate and rhythm; No murmurs, rubs, or gallops  ABDOMEN: Soft, tender, Nondistended; Bowel sounds present PEG in place  EXTREMITIES:  2+ Peripheral Pulses, No clubbing, cyanosis, or edema  PSYCH: AAOx3  NEUROLOGY: Bed bound, functionally quadriplegic  SKIN: No rashes or lesions    LABS:                        8.5    6.07  )-----------( 117      ( 28 Oct 2023 07:19 )             28.4     10-28    135  |  100  |  16  ----------------------------<  77  3.3<L>   |  27  |  <0.30<L>    Ca    8.3<L>      28 Oct 2023 07:19  Phos  3.2     10-28  Mg     1.9     10-28    TPro  6.7  /  Alb  3.0<L>  /  TBili  0.2  /  DBili  x   /  AST  21  /  ALT  23  /  AlkPhos  47  10-28    PT/INR - ( 27 Oct 2023 14:39 )   PT: 12.3 sec;   INR: 1.12 ratio         PTT - ( 27 Oct 2023 14:39 )  PTT:30.8 sec      Urinalysis Basic - ( 28 Oct 2023 07:19 )    Color: x / Appearance: x / SG: x / pH: x  Gluc: 77 mg/dL / Ketone: x  / Bili: x / Urobili: x   Blood: x / Protein: x / Nitrite: x   Leuk Esterase: x / RBC: x / WBC x   Sq Epi: x / Non Sq Epi: x / Bacteria: x        RADIOLOGY & ADDITIONAL TESTS:    Imaging Personally Reviewed:    Consultant(s) Notes Reviewed:      Care Discussed with Consultants/Other Providers:

## 2023-11-02 NOTE — CHART NOTE - NSCHARTNOTEFT_GEN_A_CORE
Nutrition  Chart Note    Chart reviewed. Events noted. Per chart: 73y old  Female who presents with a chief complaint of 74 y/o F with PMHx of T2DM, HTN, HLD, anemia, hypothyroidism, RA, fibromyalgia, remote cerebral aneurysm repair, acute hypercapnic respiratory failure now with tracheostomy, PEG tube, and bedbound (trach change , PEG change ), sacral pressure ulcer, BIBEMS from home for 6 day hx of bleeding from the tracheostomy and PEG tube with associated abdominal pain, recent history of auditory and visual hallucinations, and with chronic sacral decubitus ulcer.    Diet, NPO with Tube Feed:   Tube Feeding Modality: Gastrostomy  Casie Farns Peptide 1.5  Total Volume for 24 Hours (mL): 900  Continuous  Starting Tube Feed Rate {mL per Hour}: 10  Increase Tube Feed Rate by (mL): 10     Every 4 hours  Until Goal Tube Feed Rate (mL per Hour): 45  Tube Feed Duration (in Hours): 20  Tube Feed Start Time: 13:00   Rate (mL per Hour): 10   Frequency: Every 2 Hours (23 @ 10:16) [Active]    Weights:   Daily Weight in k.9 ()    MEDICATIONS  (STANDING):  ascorbic acid  calcium carbonate    500 mG (Tums) Chewable  cefepime   IVPB  dextrose 50% Injectable  insulin glargine Injectable (LANTUS)  insulin lispro (ADMELOG) corrective regimen sliding scale  levothyroxine  multivitamin/minerals/iron Oral Solution (CENTRUM)  pantoprazole   Suspension  predniSONE   Tablet  simethicone  zinc sulfate    Pertinent Labs:   A1C with Estimated Average Glucose Result: 7.5 % (10-28-23 @ 07:18)    Finger Sticks:  POCT Blood Glucose.: 215 mg/dL ( @ 09:01)  POCT Blood Glucose.: 136 mg/dL ( @ 05:37)  POCT Blood Glucose.: 156 mg/dL ( @ 00:01)  POCT Blood Glucose.: 194 mg/dL ( @ 16:51)  POCT Blood Glucose.: 237 mg/dL ( @ 12:14)    Spoke with pt daughter again on the phone, Marysol Woods ([phone]). Daughter requesting to re-add Alfred despite it not being vegan. Writer explained to daughter the pt current nutrient requirements, amount of calories and protein current tube feed order is providing and different vitamins/minerals pt ordered for. Pt daughter also requesting LPS to be added along with Alfred, although pt currently meeting protein requirements on current tube feed order (this was explained to daughter by writer).   Spoke with PRITI Lawson to re add Alfred and to add LPS.     Rufina Dominguez, MS, RD, CDN / [employer] Nutrition  Chart Note    Chart reviewed. Events noted. Per chart: 73y old  Female who presents with a chief complaint of 72 y/o F with PMHx of T2DM, HTN, HLD, anemia, hypothyroidism, RA, fibromyalgia, remote cerebral aneurysm repair, acute hypercapnic respiratory failure now with tracheostomy, PEG tube, and bedbound (trach change , PEG change ), sacral pressure ulcer, BIBEMS from home for 6 day hx of bleeding from the tracheostomy and PEG tube with associated abdominal pain, recent history of auditory and visual hallucinations, and with chronic sacral decubitus ulcer.    Diet, NPO with Tube Feed:   Tube Feeding Modality: Gastrostomy  Casie Farns Peptide 1.5  Total Volume for 24 Hours (mL): 900  Continuous  Starting Tube Feed Rate {mL per Hour}: 10  Increase Tube Feed Rate by (mL): 10     Every 4 hours  Until Goal Tube Feed Rate (mL per Hour): 45  Tube Feed Duration (in Hours): 20  Tube Feed Start Time: 13:00   Rate (mL per Hour): 10   Frequency: Every 2 Hours (23 @ 10:16) [Active]    Weights:   Daily Weight in k.9 ()    MEDICATIONS  (STANDING):  ascorbic acid  calcium carbonate    500 mG (Tums) Chewable  cefepime   IVPB  dextrose 50% Injectable  insulin glargine Injectable (LANTUS)  insulin lispro (ADMELOG) corrective regimen sliding scale  levothyroxine  multivitamin/minerals/iron Oral Solution (CENTRUM)  pantoprazole   Suspension  predniSONE   Tablet  simethicone  zinc sulfate    Pertinent Labs:   A1C with Estimated Average Glucose Result: 7.5 % (10-28-23 @ 07:18)    Finger Sticks:  POCT Blood Glucose.: 215 mg/dL ( @ 09:01)  POCT Blood Glucose.: 136 mg/dL ( @ 05:37)  POCT Blood Glucose.: 156 mg/dL ( @ 00:01)  POCT Blood Glucose.: 194 mg/dL ( @ 16:51)  POCT Blood Glucose.: 237 mg/dL ( @ 12:14)    Spoke with pt daughter again on the phone, Marysol Woods ([phone]). Daughter requesting to re-add Alfred despite it not being vegan. Writer explained to daughter the pt current nutrient requirements, amount of calories and protein current tube feed order is providing and different vitamins/minerals pt ordered for. Pt daughter also requesting LPS to be added along with Alfred, although pt currently meeting protein requirements on current tube feed order (this was explained to daughter by writer).   Spoke with PRITI Lawson with recommendations.     Rufina Dominguez, MS, RD, CDN / Teams Nutrition  Chart Note    Chart reviewed. Events noted. Per chart: 73y old  Female who presents with a chief complaint of 72 y/o F with PMHx of T2DM, HTN, HLD, anemia, hypothyroidism, RA, fibromyalgia, remote cerebral aneurysm repair, acute hypercapnic respiratory failure now with tracheostomy, PEG tube, and bedbound (trach change , PEG change ), sacral pressure ulcer, BIBEMS from home for 6 day hx of bleeding from the tracheostomy and PEG tube with associated abdominal pain, recent history of auditory and visual hallucinations, and with chronic sacral decubitus ulcer.    Diet, NPO with Tube Feed:   Tube Feeding Modality: Gastrostomy  Casie Farns Peptide 1.5  Total Volume for 24 Hours (mL): 900  Continuous  Starting Tube Feed Rate {mL per Hour}: 10  Increase Tube Feed Rate by (mL): 10     Every 4 hours  Until Goal Tube Feed Rate (mL per Hour): 45  Tube Feed Duration (in Hours): 20  Tube Feed Start Time: 13:00   Rate (mL per Hour): 10   Frequency: Every 2 Hours (23 @ 10:16) [Active]    Weights:   Daily Weight in k.9 ()    MEDICATIONS  (STANDING):  ascorbic acid  calcium carbonate    500 mG (Tums) Chewable  cefepime   IVPB  dextrose 50% Injectable  insulin glargine Injectable (LANTUS)  insulin lispro (ADMELOG) corrective regimen sliding scale  levothyroxine  multivitamin/minerals/iron Oral Solution (CENTRUM)  pantoprazole   Suspension  predniSONE   Tablet  simethicone  zinc sulfate    Pertinent Labs:   A1C with Estimated Average Glucose Result: 7.5 % (10-28-23 @ 07:18)    Finger Sticks:  POCT Blood Glucose.: 215 mg/dL ( @ 09:01)  POCT Blood Glucose.: 136 mg/dL ( @ 05:37)  POCT Blood Glucose.: 156 mg/dL ( @ 00:01)  POCT Blood Glucose.: 194 mg/dL ( @ 16:51)  POCT Blood Glucose.: 237 mg/dL ( @ 12:14)    Spoke with pt daughter again on the phone, Marysol Woods ([phone]). Daughter requesting to re-add Alfred despite it not being vegan. Writer explained to daughter the pt current nutrient requirements, amount of calories and protein current tube feed order is providing and different vitamins/minerals pt ordered for. Pt daughter also requesting LPS to be added along with Alfred, although pt currently meeting protein requirements on current tube feed order (this was explained to daughter by writer).   Spoke with PRITI Lawson with recommendations.   Add Alfred as it will aid in wound healing  Can add LPS daily for now. continue to monitor labs including renal indices.     Rufina Dominguez, MS, RD, CDN / Teams Nutrition  Chart Note    Chart reviewed. Events noted. Per chart: 73y old  Female who presents with a chief complaint of 74 y/o F with PMHx of T2DM, HTN, HLD, anemia, hypothyroidism, RA, fibromyalgia, remote cerebral aneurysm repair, acute hypercapnic respiratory failure now with tracheostomy, PEG tube, and bedbound (trach change , PEG change ), sacral pressure ulcer, BIBEMS from home for 6 day hx of bleeding from the tracheostomy and PEG tube with associated abdominal pain, recent history of auditory and visual hallucinations, and with chronic sacral decubitus ulcer.    Diet, NPO with Tube Feed:   Tube Feeding Modality: Gastrostomy  Casie Farns Peptide 1.5  Total Volume for 24 Hours (mL): 900  Continuous  Starting Tube Feed Rate {mL per Hour}: 10  Increase Tube Feed Rate by (mL): 10     Every 4 hours  Until Goal Tube Feed Rate (mL per Hour): 45  Tube Feed Duration (in Hours): 20  Tube Feed Start Time: 13:00   Rate (mL per Hour): 10   Frequency: Every 2 Hours (23 @ 10:16) [Active]    Weights:   Daily Weight in k.9 ()    MEDICATIONS  (STANDING):  ascorbic acid  calcium carbonate    500 mG (Tums) Chewable  cefepime   IVPB  dextrose 50% Injectable  insulin glargine Injectable (LANTUS)  insulin lispro (ADMELOG) corrective regimen sliding scale  levothyroxine  multivitamin/minerals/iron Oral Solution (CENTRUM)  pantoprazole   Suspension  predniSONE   Tablet  simethicone  zinc sulfate    Pertinent Labs:   A1C with Estimated Average Glucose Result: 7.5 % (10-28-23 @ 07:18)    Finger Sticks:  POCT Blood Glucose.: 215 mg/dL ( @ 09:01)  POCT Blood Glucose.: 136 mg/dL ( @ 05:37)  POCT Blood Glucose.: 156 mg/dL ( @ 00:01)  POCT Blood Glucose.: 194 mg/dL ( @ 16:51)  POCT Blood Glucose.: 237 mg/dL ( @ 12:14)    Spoke with pt daughter again on the phone, Marysol Woods ([phone]). Daughter requesting to re-add Alfred despite it not being vegan. Writer explained to daughter the pt current nutrient requirements, amount of calories and protein current tube feed order is providing and different vitamins/minerals pt ordered for. Pt daughter also requesting LPS to be added along with Alfred, although pt currently meeting protein requirements on current tube feed order (this was explained to daughter by writer). Daughter still requesting LPS.   Spoke with PRITI Lawson with recommendations.     Rufina Dominguez, MS, RD, CDN / Teams

## 2023-11-02 NOTE — PROGRESS NOTE ADULT - SUBJECTIVE AND OBJECTIVE BOX
INTERVAL HPI/OVERNIGHT EVENTS:    no leakage noted surrounding peg       MEDICATIONS  (STANDING):  acetaminophen   Oral Liquid .. 650 milliGRAM(s) Oral every 6 hours  acetylcysteine 10%  Inhalation 4 milliLiter(s) Inhalation every 6 hours  albuterol/ipratropium for Nebulization 3 milliLiter(s) Nebulizer every 6 hours  artificial  tears Solution 2 Drop(s) Both EYES every 6 hours  ascorbic acid 500 milliGRAM(s) Oral daily  calcium carbonate    500 mG (Tums) Chewable 1 Tablet(s) Chew every 12 hours  cefepime   IVPB 2000 milliGRAM(s) IV Intermittent every 8 hours  chlorhexidine 0.12% Liquid 15 milliLiter(s) Oral Mucosa every 12 hours  chlorhexidine 4% Liquid 1 Application(s) Topical <User Schedule>  dextrose 50% Injectable 50 milliLiter(s) IV Push once  escitalopram 10 milliGRAM(s) Oral daily  fentaNYL   Patch  25 MICROgram(s)/Hr 1 Patch Transdermal every 72 hours  gabapentin Solution 100 milliGRAM(s) Enteral Tube at bedtime  insulin glargine Injectable (LANTUS) 10 Unit(s) SubCutaneous every morning  insulin lispro (ADMELOG) corrective regimen sliding scale   SubCutaneous every 6 hours  levothyroxine 100 MICROGram(s) Enteral Tube daily  multivitamin/minerals/iron Oral Solution (CENTRUM) 15 milliLiter(s) Oral daily  oxybutynin 5 milliGRAM(s) Oral daily  oxycodone    5 mG/acetaminophen 325 mG 1 Tablet(s) Oral once  pantoprazole   Suspension 40 milliGRAM(s) Enteral Tube daily  polyethylene glycol 3350 17 Gram(s) Oral two times a day  predniSONE   Tablet 5 milliGRAM(s) Oral daily  QUEtiapine 12.5 milliGRAM(s) Oral at bedtime  senna Syrup 5 milliLiter(s) Oral once  simethicone 80 milliGRAM(s) Chew four times a day  zinc sulfate 220 milliGRAM(s) Oral daily    MEDICATIONS  (PRN):  sodium chloride 0.65% Nasal 2 Spray(s) Both Nostrils two times a day PRN Nasal Congestion      Allergies    penicillin (Unknown)  heparin (Unknown)  Tagamet (Unknown)  pineapple (Unknown)  walnut (Unknown)  Pecan, Filbert, Hazelnut (Unknown)  Lyrica (Unknown)    Intolerances        Review of Systems: *pt minimally verbal to nonverbal, unable to obtain ROS       Vital Signs:   Vital Signs Last 24 Hrs  T(C): 36.7 (02 Nov 2023 12:11), Max: 37.3 (01 Nov 2023 21:19)  T(F): 98 (02 Nov 2023 12:11), Max: 99.1 (01 Nov 2023 21:19)  HR: 80 (02 Nov 2023 12:11) (71 - 81)  BP: 132/71 (02 Nov 2023 12:11) (122/57 - 143/75)  BP(mean): --  RR: 20 (02 Nov 2023 12:11) (20 - 22)  SpO2: 100% (02 Nov 2023 12:11) (96% - 100%)    Parameters below as of 02 Nov 2023 12:11  Patient On (Oxygen Delivery Method): ventilator      Daily     Daily           PHYSICAL EXAM:    Constitutional: NAD  HEENT: EOMI, throat clear  Neck: No LAD, supple  Respiratory: CTA and P  Cardiovascular: S1 and S2, RRR, no M  Gastrointestinal: BS+, soft, NT/ND, neg HSM, +PEG c/d/i, running, no leakage noted  Extremities: No peripheral edema, neg clubbing, cyanosis  Vascular: 2+ peripheral pulses  Neurological: A/O  Psychiatric: weakened  Skin: No rashes      LABS:                        8.5    7.99  )-----------( 130      ( 01 Nov 2023 06:28 )             29.3     11-01    135  |  101  |  28<H>  ----------------------------<  247<H>  4.0   |  25  |  <0.30<L>    Ca    8.7      01 Nov 2023 06:28  Phos  3.4     11-01  Mg     2.0     11-01    TPro  6.3  /  Alb  2.7<L>  /  TBili  0.2  /  DBili  x   /  AST  21  /  ALT  24  /  AlkPhos  52  11-01      Urinalysis Basic - ( 01 Nov 2023 06:28 )    Color: x / Appearance: x / SG: x / pH: x  Gluc: 247 mg/dL / Ketone: x  / Bili: x / Urobili: x   Blood: x / Protein: x / Nitrite: x   Leuk Esterase: x / RBC: x / WBC x   Sq Epi: x / Non Sq Epi: x / Bacteria: x            RADIOLOGY & ADDITIONAL TESTS:

## 2023-11-02 NOTE — PROGRESS NOTE ADULT - PROBLEM SELECTOR PLAN 6
-Continue home Prednisone regimen  *fibromyalgia  -continue home Gabapentin 100mg daily  -continue home Fentanyl patches  - added Lidocaine patch   - added Percocet q6 hrs (in addition to tylenol PRN) -Continue home Prednisone regimen  *fibromyalgia  -continue home Gabapentin 100mg daily  -continue home Fentanyl patches  - added Lidocaine patch   - added Percocet q6 hrs (in addition to Tylenol PRN)

## 2023-11-02 NOTE — PROGRESS NOTE ADULT - PROBLEM SELECTOR PLAN 2
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   - S/p CT abd/pelvis (10/28): Sacral decubitus ulcer extending to the coccyx with osseous remodeling and pelvic MRI or bone scan to recc to exclude osteomyelitis.  - MRI pelvis 10/30 with Osteomyelitis, c/w current Cefepime for 7 days, Vancomycin d/marli   - Elevated, ESR 85   - Elevated,    - Wound care on board  - ID consult placed

## 2023-11-02 NOTE — PROGRESS NOTE ADULT - ASSESSMENT
72 y/o F with PMHx of T2DM, HTN, HLD, anemia, hypothyroidism, RA, fibromyalgia, remote cerebral aneurysm repair, acute hypercapnic respiratory failure now with tracheostomy, PEG tube, and bedbound (trach change 8/18, PEG change 8/14), sacral pressure ulcer, BIBEMS from home for 6 day hx of bleeding from the tracheostomy and PEG tube with associated abdominal pain, recent history of auditory and visual hallucinations, and with chronic sacral decubitus ulcer. Exam notable for mild abdominal distention with LLQ and RLQ tenderness, tracheostomy in place with no obvious bleeding, PEG tube with scant amount of dried blood, at baseline neurologic status. Imaging showing no active bleeding around tracheostomy site, however was notable for mild thickening along PEG tube tract, sacral ulcer extending to the coccyx suggestive of possible osteomyelitis, and bibasilar patchy lung opacities with volume loss. Patient admitted for respiratory support, wound care of tracheostomy and PEG, and management of presumed sacral osteomyelitis.    10/28: Accepted from MICU overnight from ER; Chronic vent dependent from home; Stable on home vent settings. Started Empiric Abx for suspected PNA and possible sacral OM in setting of chronic sacral decubiti. Daughter c/o intermittent oozing (bleeding) from trach/peg site. CT imaging performed, no active bleeding source noted. F/u all Cultures. ID and GI consults called. Will consider ent consult on Mon if further trach bleed. Call Psych cx on Mon.     10/30: Lukasz saw the patient. Sputum cx with pseudomonas and staph, changed aztreonam to Cefepime as per ID, sent MRSA PCR, Hyperglycemia added Lantus 10units daily and increased sliding scale to moderate, pt went for MRI of the pelvis    10/31: Osteomyelitis as per MRI, ID recommending a total to 6wks of abx. Cefepime to be finished by 11/5 and then Cipro and Keflex to be added for the next 5 weeks, MRSA PCR negative d/ed Vancomycin, started on Lovenox ppx. Had lengthy conversations with daughter. She is concerned of patient's brain aneurysm and requesting a MRI of the brain and also requesting wound culture. Wound care on board.     11/1: Remains on Cefepime for Pseudomonas and S. Aureus pneumonia to finish 11/5 then change to PO Cipro/Keflex x5 weeks. Will consult ENT regarding persistent blood tinged sputum and as per family request. Added lidocaine patch, percocet standing q6 hrs and changed tylenol to PRN for pain control. Increased Lantus to home dose 14U. PSV 10-15/5 as tolerated during day.  72 y/o F with PMHx of T2DM, HTN, HLD, anemia, hypothyroidism, RA, fibromyalgia, remote cerebral aneurysm repair, acute hypercapnic respiratory failure now with tracheostomy, PEG tube, and bedbound (trach change 8/18, PEG change 8/14), sacral pressure ulcer, BIBEMS from home for 6 day hx of bleeding from the tracheostomy and PEG tube with associated abdominal pain, recent history of auditory and visual hallucinations, and with chronic sacral decubitus ulcer. Exam notable for mild abdominal distention with LLQ and RLQ tenderness, tracheostomy in place with no obvious bleeding, PEG tube with scant amount of dried blood, at baseline neurologic status. Imaging showing no active bleeding around tracheostomy site, however was notable for mild thickening along PEG tube tract, sacral ulcer extending to the coccyx suggestive of possible osteomyelitis, and bibasilar patchy lung opacities with volume loss. Patient admitted for respiratory support, wound care of tracheostomy and PEG, and management of presumed sacral osteomyelitis.    10/28: Accepted from MICU overnight from ER; Chronic vent dependent from home; Stable on home vent settings. Started Empiric Abx for suspected PNA and possible sacral OM in setting of chronic sacral decubiti. Daughter c/o intermittent oozing (bleeding) from trach/peg site. CT imaging performed, no active bleeding source noted. F/u all Cultures. ID and GI consults called. Will consider ent consult on Mon if further trach bleed. Call Psych cx on Mon.       11/2: Remains on Cefepime for Pseudomonas and S. Aureus pneumonia to finish 11/5 then change to PO Cipro/Keflex x5 weeks. Seen by ENT regarding persistent blood tinged sputum. Added lidocaine patch, percocet standing q6 hrs and changed Tylenol to PRN for pain control.

## 2023-11-02 NOTE — PROGRESS NOTE ADULT - NS ATTEND AMEND GEN_ALL_CORE FT
73F w/ DM, HTN, HLD, anemia, hypothyroidism, RA, fibromyalgia, remote cerebral aneursym repair, hypercapnic respiratory failure s/p tracheostomy/PEG, bedbound, sacral pressure ulcer. Admitted w/ bleeding from tracheostomy and PEG w/ associated abdominal pain, recent hx of auditory/visual hallucinations. Since admission, no further bleeding noted from either trach or PEG. Imaging showed mild thickening along PEG tube tract and sacral ulcer extending to coccyx suggestive of possible OM.     Pt's mental status is at baseline. Psych saw pt, continue lexapro and seroquel. Continue percocet, lidocaine patch and Fentanyl patch for pain, w/ tylenol PRN. Trach to vent, which is baseline, continue PS weaning daily; chest PT for airway clearance. ENT evaluated pt, no acute findings. Sputum growing MSSA and pseudomonas, currently on cefepime; noted acute OM on MRI of pelvis - per ID to start PO keflex and cipro after completes 7 days of cefepime. Wound care for sacral wound. Tolerating TF, continue bowel regimen; GI evaluated pt, recommending decreasing feed rate and loosened PEG tube. Continue levothyroxine. Continue lantus and ISS for DM. Prednisone for RA. SCDs for DVT ppx. Full code. D/c likely when finish abx - will likely go home as home care agency will be able to service patient 73F w/ DM, HTN, HLD, anemia, hypothyroidism, RA, fibromyalgia, remote cerebral aneursym repair, hypercapnic respiratory failure s/p tracheostomy/PEG, bedbound, sacral pressure ulcer. Admitted w/ bleeding from tracheostomy and PEG w/ associated abdominal pain, recent hx of auditory/visual hallucinations. Since admission, no further bleeding noted from either trach or PEG. Imaging showed mild thickening along PEG tube tract and sacral ulcer extending to coccyx suggestive of possible OM.     Pt's mental status is at baseline. Psych saw pt, continue Lexapro and Seroquel. Continue percocet, lidocaine patch and Fentanyl patch for pain, w/ Tylenol PRN. Trach to vent, which is baseline, continue PS weaning daily; chest PT for airway clearance. ENT evaluated pt, no acute findings. Sputum growing MSSA and pseudomonas, currently on cefepime; noted acute OM on MRI of pelvis - per ID to start PO keflex and cipro after completes 7 days of cefepime. Wound care for sacral wound. Tolerating TF, continue bowel regimen; GI evaluated pt, recommending decreasing feed rate and loosened PEG tube. Continue levothyroxine. Continue lanatus and ISS for DM. Prednisone for RA. SCDs for DVT ppx. Full code. D/c likely when finish abx - will likely go home as home care agency will be able to service patient

## 2023-11-02 NOTE — PROGRESS NOTE ADULT - PROBLEM SELECTOR PLAN 1
Found w/ Bilateral PNA vs Atelectasis, and Possible OM Sacrum   S/p Fevers at home, Afebrile since admission   - S/p Chest CT (10/28):  c/w Bilateral PNA vs Atelectasis   - Started Empirically on Vanco, Aztreonam   - Blood cx: NGTD   - urine culture: negative   - Rvp: negative; Sputum cx pending    - Sputum cx + Pseudomonas aeruginosa, S. aureus, mixed GNR (10/28)  - Urine Legionella: pending   - Airway Clearance: Duoneb, Hypersal, Chest PT, PRN suctioning   - Maintain 02 sat > 92% Found w/ Bilateral PNA vs Atelectasis, and Possible OM Sacrum   S/p Fevers at home, Afebrile since admission   - S/p Chest CT (10/28):  c/w Bilateral PNA vs Atelectasis   - Started Empirically on Vanco, Aztreonam   - Blood cx: NGTD   - urine culture: negative   - Sputum cx + Pseudomonas aeruginosa, S. aureus  - Airway Clearance: Duoneb, Hypersal, Chest PT, PRN suctioning   - Maintain 02 sat > 92%

## 2023-11-03 LAB
GLUCOSE BLDC GLUCOMTR-MCNC: 140 MG/DL — HIGH (ref 70–99)
GLUCOSE BLDC GLUCOMTR-MCNC: 140 MG/DL — HIGH (ref 70–99)
GLUCOSE BLDC GLUCOMTR-MCNC: 161 MG/DL — HIGH (ref 70–99)
GLUCOSE BLDC GLUCOMTR-MCNC: 161 MG/DL — HIGH (ref 70–99)
GLUCOSE BLDC GLUCOMTR-MCNC: 163 MG/DL — HIGH (ref 70–99)
GLUCOSE BLDC GLUCOMTR-MCNC: 163 MG/DL — HIGH (ref 70–99)
GLUCOSE BLDC GLUCOMTR-MCNC: 209 MG/DL — HIGH (ref 70–99)
GLUCOSE BLDC GLUCOMTR-MCNC: 209 MG/DL — HIGH (ref 70–99)
GLUCOSE BLDC GLUCOMTR-MCNC: 225 MG/DL — HIGH (ref 70–99)
GLUCOSE BLDC GLUCOMTR-MCNC: 225 MG/DL — HIGH (ref 70–99)
GLUCOSE BLDC GLUCOMTR-MCNC: 89 MG/DL — SIGNIFICANT CHANGE UP (ref 70–99)
GLUCOSE BLDC GLUCOMTR-MCNC: 89 MG/DL — SIGNIFICANT CHANGE UP (ref 70–99)
GLUCOSE BLDC GLUCOMTR-MCNC: 96 MG/DL — SIGNIFICANT CHANGE UP (ref 70–99)
GLUCOSE BLDC GLUCOMTR-MCNC: 96 MG/DL — SIGNIFICANT CHANGE UP (ref 70–99)

## 2023-11-03 PROCEDURE — 99233 SBSQ HOSP IP/OBS HIGH 50: CPT | Mod: FS

## 2023-11-03 PROCEDURE — 99233 SBSQ HOSP IP/OBS HIGH 50: CPT

## 2023-11-03 RX ADMIN — ZINC SULFATE TAB 220 MG (50 MG ZINC EQUIVALENT) 220 MILLIGRAM(S): 220 (50 ZN) TAB at 11:39

## 2023-11-03 RX ADMIN — Medication 2 DROP(S): at 00:19

## 2023-11-03 RX ADMIN — ESCITALOPRAM OXALATE 10 MILLIGRAM(S): 10 TABLET, FILM COATED ORAL at 11:39

## 2023-11-03 RX ADMIN — Medication 650 MILLIGRAM(S): at 22:45

## 2023-11-03 RX ADMIN — LIDOCAINE 1 PATCH: 4 CREAM TOPICAL at 11:42

## 2023-11-03 RX ADMIN — Medication 2 DROP(S): at 18:30

## 2023-11-03 RX ADMIN — SIMETHICONE 80 MILLIGRAM(S): 80 TABLET, CHEWABLE ORAL at 18:30

## 2023-11-03 RX ADMIN — Medication 650 MILLIGRAM(S): at 21:45

## 2023-11-03 RX ADMIN — LIDOCAINE 1 PATCH: 4 CREAM TOPICAL at 19:53

## 2023-11-03 RX ADMIN — Medication 15 MILLILITER(S): at 11:40

## 2023-11-03 RX ADMIN — Medication 1 TABLET(S): at 11:41

## 2023-11-03 RX ADMIN — CEFEPIME 100 MILLIGRAM(S): 1 INJECTION, POWDER, FOR SOLUTION INTRAMUSCULAR; INTRAVENOUS at 21:39

## 2023-11-03 RX ADMIN — CEFEPIME 100 MILLIGRAM(S): 1 INJECTION, POWDER, FOR SOLUTION INTRAMUSCULAR; INTRAVENOUS at 13:38

## 2023-11-03 RX ADMIN — Medication 1 TABLET(S): at 18:29

## 2023-11-03 RX ADMIN — Medication 2: at 00:19

## 2023-11-03 RX ADMIN — ENOXAPARIN SODIUM 40 MILLIGRAM(S): 100 INJECTION SUBCUTANEOUS at 04:44

## 2023-11-03 RX ADMIN — SIMETHICONE 80 MILLIGRAM(S): 80 TABLET, CHEWABLE ORAL at 00:19

## 2023-11-03 RX ADMIN — Medication 500 MILLIGRAM(S): at 11:39

## 2023-11-03 RX ADMIN — Medication 100 MICROGRAM(S): at 05:53

## 2023-11-03 RX ADMIN — CHLORHEXIDINE GLUCONATE 15 MILLILITER(S): 213 SOLUTION TOPICAL at 05:53

## 2023-11-03 RX ADMIN — Medication 1 TABLET(S): at 05:53

## 2023-11-03 RX ADMIN — Medication 4: at 13:10

## 2023-11-03 RX ADMIN — GABAPENTIN 100 MILLIGRAM(S): 400 CAPSULE ORAL at 21:38

## 2023-11-03 RX ADMIN — INSULIN GLARGINE 14 UNIT(S): 100 INJECTION, SOLUTION SUBCUTANEOUS at 08:44

## 2023-11-03 RX ADMIN — SIMETHICONE 80 MILLIGRAM(S): 80 TABLET, CHEWABLE ORAL at 11:40

## 2023-11-03 RX ADMIN — Medication 2 DROP(S): at 11:44

## 2023-11-03 RX ADMIN — Medication 5 MILLIGRAM(S): at 11:40

## 2023-11-03 RX ADMIN — CHLORHEXIDINE GLUCONATE 15 MILLILITER(S): 213 SOLUTION TOPICAL at 18:30

## 2023-11-03 RX ADMIN — Medication 2 DROP(S): at 05:54

## 2023-11-03 RX ADMIN — CEFEPIME 100 MILLIGRAM(S): 1 INJECTION, POWDER, FOR SOLUTION INTRAMUSCULAR; INTRAVENOUS at 05:54

## 2023-11-03 RX ADMIN — PANTOPRAZOLE SODIUM 40 MILLIGRAM(S): 20 TABLET, DELAYED RELEASE ORAL at 11:40

## 2023-11-03 RX ADMIN — QUETIAPINE FUMARATE 12.5 MILLIGRAM(S): 200 TABLET, FILM COATED ORAL at 21:39

## 2023-11-03 RX ADMIN — Medication 5 MILLIGRAM(S): at 05:53

## 2023-11-03 RX ADMIN — SIMETHICONE 80 MILLIGRAM(S): 80 TABLET, CHEWABLE ORAL at 05:54

## 2023-11-03 RX ADMIN — CHLORHEXIDINE GLUCONATE 1 APPLICATION(S): 213 SOLUTION TOPICAL at 05:54

## 2023-11-03 NOTE — PROGRESS NOTE ADULT - PROBLEM SELECTOR PLAN 3
Chronic Trach/ Vent dependant 2/2 Hypercapnic Resp Failure since 10/2022   -S/p Trach # 8 Cuffed Flex Shiley; trach change 8/18, PEG change 8/14 per records   - Continue Home vent settings: (300 TV/ RR 12/5 Peep/ Fio2 30%)  - Tolerates intermittent PSV 15/5 during day/ Vent HS  - Airway Clearance: Duoneb, Hypersal, Chest PT, PRN suctioning   - Maintain 02 sat > 92%    Tracheal Bleeding (Intermittent):   -S/p CT Angio neck: No evidence of active bleeding around tracheostomy site. No hematoma.  No collection   - Advise RN staff to use Red rubber trach catheters to avoid suction trauma   - Seen by ENT; Kath and b/l bronchi visualized without any trauma. small amount of blood tinged secretions resting at beginning of right bronchus not actively bleeding.

## 2023-11-03 NOTE — PROGRESS NOTE ADULT - ASSESSMENT
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 Bleeding from gastrostomy. Site appears macerated. Multifactorial, possible the PEG has been too tight at home.  -PEG loosened at bedside  -no further bleeding  -wound care following    2. Leakage at PEG site.   -no further leaking     3. Abdominal pain. CT negative for acute pathology.  -rec miralax BID    4. Respiratory   per RCU        Advanced care planning forms were discussed. Code status including forceful chest compressions, defibrillation and intubation were discussed. The risks benefits and alternatives to pertinent gastrointestinal procedures and interventions were discussed in detail and all questions were answered. Duration: 15 Minutes.    [facility]  Andrew Morgan M.D.   [address] [place] [address]  Office: [phone]

## 2023-11-03 NOTE — PROGRESS NOTE ADULT - SUBJECTIVE AND OBJECTIVE BOX
CC: trach tube change    HPI: 72 y/o F with PMHx of T2DM, HTN, HLD, anemia, hypothyroidism, RA, fibromyalgia, remote cerebral aneurysm repair, acute hypercapnic respiratory failure now with tracheostomy, PEG tube, and bedbound (trach change 8/18, PEG change 8/14), sacral pressure ulcer, BIBEMS from home for 6 day hx of bleeding from the tracheostomy and PEG tube with associated abdominal pain, recent history of auditory and visual hallucinations, and with chronic sacral decubitus ulcer. ENT called for trach tube change. Pt is on chronic ventilator. Pt denies any changes in n/v, tinnitus, dizziness, ear pain, congestion, recent URI, otorrhea, hearing loss, or trauma or recent travel.           PAST MEDICAL & SURGICAL HISTORY:  Diabetes      Rheumatoid arthritis      Fibromyalgia      Hypothyroid      Hypertension      H/O tracheostomy      PEG (percutaneous endoscopic gastrostomy) status        Allergies    penicillin (Unknown)  heparin (Unknown)  Tagamet (Unknown)  pineapple (Unknown)  walnut (Unknown)  Pecan, Filbert, Hazelnut (Unknown)  Lyrica (Unknown)    Intolerances      MEDICATIONS  (STANDING):  artificial  tears Solution 2 Drop(s) Both EYES every 6 hours  ascorbic acid 500 milliGRAM(s) Oral daily  calcium carbonate    500 mG (Tums) Chewable 1 Tablet(s) Chew every 12 hours  cefepime   IVPB 2000 milliGRAM(s) IV Intermittent every 8 hours  chlorhexidine 0.12% Liquid 15 milliLiter(s) Oral Mucosa every 12 hours  chlorhexidine 4% Liquid 1 Application(s) Topical <User Schedule>  dextrose 50% Injectable 50 milliLiter(s) IV Push once  enoxaparin Injectable 40 milliGRAM(s) SubCutaneous every 24 hours  escitalopram 10 milliGRAM(s) Oral daily  fentaNYL   Patch  25 MICROgram(s)/Hr 1 Patch Transdermal every 72 hours  gabapentin Solution 100 milliGRAM(s) Enteral Tube at bedtime  insulin glargine Injectable (LANTUS) 14 Unit(s) SubCutaneous every morning  insulin lispro (ADMELOG) corrective regimen sliding scale   SubCutaneous every 6 hours  lactobacillus acidophilus 1 Tablet(s) Oral daily  levothyroxine 100 MICROGram(s) Enteral Tube daily  lidocaine   4% Patch 1 Patch Transdermal daily  multivitamin/minerals/iron Oral Solution (CENTRUM) 15 milliLiter(s) Oral daily  oxybutynin 5 milliGRAM(s) Oral daily  oxycodone    5 mG/acetaminophen 325 mG 1 Tablet(s) Oral every 6 hours  pantoprazole   Suspension 40 milliGRAM(s) Enteral Tube daily  predniSONE   Tablet 5 milliGRAM(s) Oral daily  QUEtiapine 12.5 milliGRAM(s) Oral at bedtime  simethicone 80 milliGRAM(s) Chew four times a day  zinc sulfate 220 milliGRAM(s) Oral daily    MEDICATIONS  (PRN):  acetaminophen   Oral Liquid .. 650 milliGRAM(s) Oral every 6 hours PRN Temp greater or equal to 38C (100.4F), Moderate Pain (4 - 6)  albuterol/ipratropium for Nebulization 3 milliLiter(s) Nebulizer every 6 hours PRN Bronchospasm  sodium chloride 0.65% Nasal 2 Spray(s) Both Nostrils two times a day PRN Nasal Congestion      Social History: no tobacco, no etoh     Family history: Pt denies any FHx      ROS:   ENT: all negative except as noted in HPI   Pulm: denies SOB, cough, hemoptysis  Neuro: denies numbness/tingling, loss of sensation  Endo: denies heat/cold intolerance, excessive sweating      Vital Signs Last 24 Hrs  T(C): 37.2 (03 Nov 2023 05:02), Max: 37.2 (03 Nov 2023 05:02)  T(F): 99 (03 Nov 2023 05:02), Max: 99 (03 Nov 2023 05:02)  HR: 77 (03 Nov 2023 09:55) (61 - 80)  BP: 150/60 (03 Nov 2023 05:02) (111/62 - 163/75)  BP(mean): --  RR: 16 (03 Nov 2023 05:02) (12 - 25)  SpO2: 99% (03 Nov 2023 09:55) (98% - 100%)    Parameters below as of 03 Nov 2023 03:21  Patient On (Oxygen Delivery Method): ventilator    O2 Concentration (%): 30              PHYSICAL EXAM:  Gen: NAD  Skin: No rashes, bruises, or lesions  Head: Normocephalic, Atraumatic  Face: no edema, erythema, or fluctuance. Parotid glands soft without mass  Eyes: no scleral injection  Nose: Nares bilaterally patent, no discharge  Mouth: No Stridor / Drooling / Trismus.  Mucosa moist, tongue/uvula midline, oropharynx clear  Neck:  #8 shiley LPC in place. supple, no lymphadenopathy, trachea midline, no masses  Lymphatic: No lymphadenopathy  Resp: breathing easily on ventilator, no stridor  Neuro: facial nerve intact, no facial droop      Tracheostomy tube change procedure:  Risks and benefits discussed with pt. Then, pt was placed in a supine position with neck extended. A 10 CC syringe used to completely removed any remaining air in the trach cuff. #8 shiley cuffed trach tube was removed and replaced with a #9 portex cuffed trach tube. No bleeding. Clear secretions suctioned from stoma. Bedside tracheoscopy performed, nick visualized, no purulence, no erythema, no evidence of tracheomalacia. Pt tolerated the procedure well without complications.     CC: trach tube change    HPI: 74 y/o F with PMHx of T2DM, HTN, HLD, anemia, hypothyroidism, RA, fibromyalgia, remote cerebral aneurysm repair, acute hypercapnic respiratory failure now with tracheostomy, PEG tube, and bedbound (trach change 8/18, PEG change 8/14), sacral pressure ulcer, BIBEMS from home for 6 day hx of bleeding from the tracheostomy and PEG tube with associated abdominal pain, recent history of auditory and visual hallucinations, and with chronic sacral decubitus ulcer. ENT called for trach tube change. Pt is on chronic ventilator. Pt denies any changes in n/v, tinnitus, dizziness, ear pain, congestion, recent URI, otorrhea, hearing loss, or trauma or recent travel.           PAST MEDICAL & SURGICAL HISTORY:  Diabetes      Rheumatoid arthritis      Fibromyalgia      Hypothyroid      Hypertension      H/O tracheostomy      PEG (percutaneous endoscopic gastrostomy) status        Allergies    penicillin (Unknown)  heparin (Unknown)  Tagamet (Unknown)  pineapple (Unknown)  walnut (Unknown)  Pecan, Filbert, Hazelnut (Unknown)  Lyrica (Unknown)    Intolerances      MEDICATIONS  (STANDING):  artificial  tears Solution 2 Drop(s) Both EYES every 6 hours  ascorbic acid 500 milliGRAM(s) Oral daily  calcium carbonate    500 mG (Tums) Chewable 1 Tablet(s) Chew every 12 hours  cefepime   IVPB 2000 milliGRAM(s) IV Intermittent every 8 hours  chlorhexidine 0.12% Liquid 15 milliLiter(s) Oral Mucosa every 12 hours  chlorhexidine 4% Liquid 1 Application(s) Topical <User Schedule>  dextrose 50% Injectable 50 milliLiter(s) IV Push once  enoxaparin Injectable 40 milliGRAM(s) SubCutaneous every 24 hours  escitalopram 10 milliGRAM(s) Oral daily  fentaNYL   Patch  25 MICROgram(s)/Hr 1 Patch Transdermal every 72 hours  gabapentin Solution 100 milliGRAM(s) Enteral Tube at bedtime  insulin glargine Injectable (LANTUS) 14 Unit(s) SubCutaneous every morning  insulin lispro (ADMELOG) corrective regimen sliding scale   SubCutaneous every 6 hours  lactobacillus acidophilus 1 Tablet(s) Oral daily  levothyroxine 100 MICROGram(s) Enteral Tube daily  lidocaine   4% Patch 1 Patch Transdermal daily  multivitamin/minerals/iron Oral Solution (CENTRUM) 15 milliLiter(s) Oral daily  oxybutynin 5 milliGRAM(s) Oral daily  oxycodone    5 mG/acetaminophen 325 mG 1 Tablet(s) Oral every 6 hours  pantoprazole   Suspension 40 milliGRAM(s) Enteral Tube daily  predniSONE   Tablet 5 milliGRAM(s) Oral daily  QUEtiapine 12.5 milliGRAM(s) Oral at bedtime  simethicone 80 milliGRAM(s) Chew four times a day  zinc sulfate 220 milliGRAM(s) Oral daily    MEDICATIONS  (PRN):  acetaminophen   Oral Liquid .. 650 milliGRAM(s) Oral every 6 hours PRN Temp greater or equal to 38C (100.4F), Moderate Pain (4 - 6)  albuterol/ipratropium for Nebulization 3 milliLiter(s) Nebulizer every 6 hours PRN Bronchospasm  sodium chloride 0.65% Nasal 2 Spray(s) Both Nostrils two times a day PRN Nasal Congestion      Social History: no tobacco, no etoh     Family history: Pt denies any FHx      ROS:   ENT: all negative except as noted in HPI   Pulm: denies SOB, cough, hemoptysis  Neuro: denies numbness/tingling, loss of sensation  Endo: denies heat/cold intolerance, excessive sweating      Vital Signs Last 24 Hrs  T(C): 37.2 (03 Nov 2023 05:02), Max: 37.2 (03 Nov 2023 05:02)  T(F): 99 (03 Nov 2023 05:02), Max: 99 (03 Nov 2023 05:02)  HR: 77 (03 Nov 2023 09:55) (61 - 80)  BP: 150/60 (03 Nov 2023 05:02) (111/62 - 163/75)  BP(mean): --  RR: 16 (03 Nov 2023 05:02) (12 - 25)  SpO2: 99% (03 Nov 2023 09:55) (98% - 100%)    Parameters below as of 03 Nov 2023 03:21  Patient On (Oxygen Delivery Method): ventilator    O2 Concentration (%): 30              PHYSICAL EXAM:  Gen: NAD  Skin: No rashes, bruises, or lesions  Head: Normocephalic, Atraumatic  Face: no edema, erythema, or fluctuance. Parotid glands soft without mass  Eyes: no scleral injection  Nose: Nares bilaterally patent, no discharge  Mouth: No Stridor / Drooling / Trismus.  Mucosa moist, tongue/uvula midline, oropharynx clear  Neck:  #8 shiley LPC in place. supple, no lymphadenopathy, trachea midline, no masses  Lymphatic: No lymphadenopathy  Resp: breathing easily on ventilator, no stridor  Neuro: facial nerve intact, no facial droop      Tracheostomy tube change procedure:  Risks and benefits discussed with pt. Then, pt was placed in a supine position with neck extended. A 10 CC syringe used to completely removed any remaining air in the trach cuff. #8 shiley LPC cuffed trach tube was removed and replaced with a #9 portex cuffed trach tube. No bleeding. Clear secretions suctioned from stoma. Bedside tracheoscopy performed, nick visualized, no purulence, no erythema, no evidence of tracheomalacia. Pt tolerated the procedure well without complications.

## 2023-11-03 NOTE — PROGRESS NOTE ADULT - PROBLEM SELECTOR PLAN 1
Found w/ Bilateral PNA vs Atelectasis, and Possible OM Sacrum   S/p Fevers at home, Afebrile since admission   - S/p Chest CT (10/28):  c/w Bilateral PNA vs Atelectasis   - Started Empirically on Vanco, Aztreonam   - Blood cx: NGTD   - urine culture: negative   - Sputum cx + Pseudomonas aeruginosa, S. aureus  - Airway Clearance: Duoneb, Hypersal, Chest PT, PRN suctioning   - Maintain 02 sat > 92%

## 2023-11-03 NOTE — PROGRESS NOTE ADULT - NS ATTEND AMEND GEN_ALL_CORE FT
Post-Anesthesia Evaluation and Assessment    Patient: Jessie Hi MRN: [id number]  SSN: [id number]    YOB: 1942  Age: 70 y.o. Sex: female       Cardiovascular Function/Vital Signs  Visit Vitals    /63    Pulse 66    Temp 36.3 °C (97.4 °F)    Resp 14    Wt 61 kg (134 lb 6.4 oz)    SpO2 96%    BMI 28.58 kg/m2       Patient is status post total IV anesthesia anesthesia for Procedure(s):  HAND CARPAL TUNNEL RELEASE ENDOSCOPIC RIGHT. Nausea/Vomiting: None    Postoperative hydration reviewed and adequate. Pain:  Pain Scale 1: Numeric (0 - 10) (07/13/17 0849)  Pain Intensity 1: 0 (07/13/17 0849)   Managed    Neurological Status:   Neuro (WDL): Within Defined Limits (07/13/17 0849)  Neuro  LUE Motor Response: Purposeful (07/13/17 0849)  LLE Motor Response: Purposeful (07/13/17 0849)  RUE Motor Response: Purposeful (07/13/17 0849)  RLE Motor Response: Purposeful (07/13/17 0849)   At baseline    Mental Status and Level of Consciousness: Arousable    Pulmonary Status:   O2 Device: Room air (07/13/17 0849)   Adequate oxygenation and airway patent    Complications related to anesthesia: None    Post-anesthesia assessment completed.  No concerns    Signed By: Angely Gómez MD     July 13, 2017 73F w/ DM, HTN, HLD, anemia, hypothyroidism, RA, fibromyalgia, remote cerebral aneursym repair, hypercapnic respiratory failure s/p tracheostomy/PEG, bedbound, sacral pressure ulcer. Admitted w/ bleeding from tracheostomy and PEG w/ associated abdominal pain, recent hx of auditory/visual hallucinations. Since admission, no further bleeding noted from either trach or PEG. Imaging showed mild thickening along PEG tube tract and sacral ulcer extending to coccyx suggestive of possible OM.     Pt's mental status is at baseline. Psych saw pt, continue Lexapro and Seroquel. Continue percocet, lidocaine patch and Fentanyl patch for pain, w/ Tylenol PRN. Trach to vent, which is baseline, continue PS weaning daily; chest PT for airway clearance. ENT evaluated pt, no acute findings on laryngoscopy; s/p trach change this morning to portex #9. Sputum growing MSSA and pseudomonas, currently on cefepime; noted acute OM on MRI of pelvis - per ID to start PO keflex and cipro after completes 7 days of cefepime. Wound care for sacral wound. Tolerating TF, continue bowel regimen; GI evaluated pt, recommending decreasing feed rate and loosened PEG tube. Continue levothyroxine. Continue lanatus and ISS for DM. Prednisone for RA. SCDs for DVT ppx. Full code. D/c likely when finish abx - will likely go home as home care agency will be able to service patient 73F w/ DM, HTN, HLD, anemia, hypothyroidism, RA, fibromyalgia, remote cerebral aneursym repair, hypercapnic respiratory failure s/p tracheostomy/PEG, bedbound, sacral pressure ulcer. Admitted w/ bleeding from tracheostomy and PEG w/ associated abdominal pain, recent hx of auditory/visual hallucinations. Since admission, no further bleeding noted from either trach or PEG. Imaging showed mild thickening along PEG tube tract and sacral ulcer extending to coccyx suggestive of possible OM.     Pt's mental status is at baseline. Psych saw pt, continue Lexapro and Seroquel. Continue percocet, lidocaine patch and Fentanyl patch for pain, w/ Tylenol PRN. Trach to vent, which is baseline, continue PS weaning daily; chest PT for airway clearance. ENT evaluated pt, no acute findings on laryngoscopy; s/p trach change this morning to portex #9. Sputum growing MSSA and pseudomonas, currently on cefepime; noted acute OM on MRI of pelvis - per ID to start PO keflex and cipro after completes 7 days of cefepime. Wound care for sacral wound. Tolerating TF, continue bowel regimen; GI evaluated pt, recommending decreasing feed rate and loosened PEG tube; family requesting swallow evaluation, will obtain. Continue levothyroxine. Continue lanatus and ISS for DM. Prednisone for RA. SCDs for DVT ppx. Full code. D/c likely when finish abx - will likely go home as home care agency will be able to service patient

## 2023-11-03 NOTE — PROGRESS NOTE ADULT - ASSESSMENT
74 y/o F with PMHx of T2DM, HTN, HLD, anemia, hypothyroidism, RA, fibromyalgia, remote cerebral aneurysm repair, acute hypercapnic respiratory failure now with tracheostomy, PEG tube, and bedbound (trach change 8/18, PEG change 8/14), sacral pressure ulcer, BIBEMS from home for 6 day hx of bleeding from the tracheostomy and PEG tube with associated abdominal pain, recent history of auditory and visual hallucinations, and with chronic sacral decubitus ulcer. ENT called for trach tube change. Pt is on chronic ventilator. #8 shiley LPC cuffed trach tube was removed and replaced with a #9 portex cuffed trach tube. Pt tolerated procedure well with no complications.

## 2023-11-03 NOTE — PROGRESS NOTE ADULT - NS ATTEND AMEND GEN_ALL_CORE FT
ENT previously seen for blood in tracheostomy now consulted for trach exchange    Patient is a 73y old  Female who presents with a chief complaint of 72 y/o F with PMHx of T2DM, HTN, HLD, anemia, hypothyroidism, RA, fibromyalgia, remote cerebral aneurysm repair, acute hypercapnic respiratory failure now with tracheostomy, PEG tube, and bedbound (trach change 8/18, PEG change 8/14), sacral pressure ulcer, BIBEMS from home for 6 day hx of bleeding from the tracheostomy and PEG tube with associated abdominal pain, recent history of auditory and visual hallucinations, and with chronic sacral decubitus ulcer.     11/1/23 #8 shiley LPC trach in neck with small amount of blood tinges secretions in inner cannula, no active bleed or ooze from stoma or on tracheoscopy. Kath and b/l bronchi visualized without any trauma. Small amount of blood tinges secretions resting at beginning of right bronchus not actively bleeding.    #8 shiley LPC cuffed trach tube was removed and replaced with a #9 portex cuffed trach tube. Pt tolerated procedure well with no complications.    Recommend:  Trach Exchange  - continue with trach care   - clean or change inner cannula daily   - respiratory care to continue with vent settings   - if continue consider bronch for lower eval

## 2023-11-03 NOTE — CHART NOTE - NSCHARTNOTEFT_GEN_A_CORE
Complete antibiotic course as prior delineated.    ID signing off. Please call with further questions or change in status.    Thomas Ahn MD  Contact on TEAMS messaging from 9am - 5pm  From 5pm-9am, on weekends, or if no response call [phone]

## 2023-11-03 NOTE — PROGRESS NOTE ADULT - PROBLEM SELECTOR PLAN 6
-Continue home Prednisone regimen  *fibromyalgia  -continue home Gabapentin 100mg daily  -continue home Fentanyl patches  - added Lidocaine patch   - added Percocet q6 hrs (in addition to Tylenol PRN)

## 2023-11-03 NOTE — PROGRESS NOTE ADULT - PROBLEM SELECTOR PLAN 5
- Hold home amlodipine held on admission, may resume if BP remains stable   - Echo from 10/17/2020 notable for hyperdynamic L ventricular systolic function, moderately severe LVOT obstruction, and EF 81% (per Wilkinson calculation) vs 77% (per Teicholtz calculation) - Hold home amlodipine held on admission, may resume if BP remains stable   - Echo from 10/17/2020 notable for hyperdynamic L ventricular systolic function, moderately severe LVOT obstruction, and EF 81% (per Wilkinson calculation) vs 77% (per Teichholz calculation)

## 2023-11-03 NOTE — PROGRESS NOTE ADULT - ASSESSMENT
74 y/o F with PMHx of T2DM, HTN, HLD, anemia, hypothyroidism, RA, fibromyalgia, remote cerebral aneurysm repair, acute hypercapnic respiratory failure now with tracheostomy, PEG tube, and bedbound (trach change 8/18, PEG change 8/14), sacral pressure ulcer, BIBEMS from home for 6 day hx of bleeding from the tracheostomy and PEG tube with associated abdominal pain, recent history of auditory and visual hallucinations, and with chronic sacral decubitus ulcer. Exam notable for mild abdominal distention with LLQ and RLQ tenderness, tracheostomy in place with no obvious bleeding, PEG tube with scant amount of dried blood, at baseline neurologic status. Imaging showing no active bleeding around tracheostomy site, however was notable for mild thickening along PEG tube tract, sacral ulcer extending to the coccyx suggestive of possible osteomyelitis, and bibasilar patchy lung opacities with volume loss. Patient admitted for respiratory support, wound care of tracheostomy and PEG, and management of presumed sacral osteomyelitis.    10/28: Accepted from MICU overnight from ER; Chronic vent dependent from home; Stable on home vent settings. Started Empiric Abx for suspected PNA and possible sacral OM in setting of chronic sacral decubiti. Daughter c/o intermittent oozing (bleeding) from trach/peg site. CT imaging performed, no active bleeding source noted. F/u all Cultures. ID and GI consults called. Will consider ent consult on Mon if further trach bleed. Call Psych cx on Mon.       11/2: Remains on Cefepime for Pseudomonas and S. Aureus pneumonia to finish 11/5 then change to PO Cipro/Keflex x5 weeks. Seen by ENT regarding persistent blood tinged sputum. Added lidocaine patch, percocet standing q6 hrs and changed Tylenol to PRN for pain control.

## 2023-11-03 NOTE — PROGRESS NOTE ADULT - SUBJECTIVE AND OBJECTIVE BOX
Patient is a 73y old  Female who presents with a chief complaint of 72 y/o F with PMHx of T2DM, HTN, HLD, anemia, hypothyroidism, RA, fibromyalgia, remote cerebral aneurysm repair, acute hypercapnic respiratory failure now with tracheostomy, PEG tube, and bedbound (trach change 8/18, PEG change 8/14), sacral pressure ulcer, BIBEMS from home for 6 day hx of bleeding from the tracheostomy and PEG tube with associated abdominal pain, recent history of auditory and visual hallucinations, and with chronic sacral decubitus ulcer.    (29 Oct 2023 14:01)      Interval Events:    REVIEW OF SYSTEMS:  [ ] Positive  [ ] All other systems negative  [ ] Unable to assess ROS because ________    Vital Signs Last 24 Hrs  T(C): 37.2 (11-03-23 @ 05:02), Max: 37.2 (11-03-23 @ 05:02)  T(F): 99 (11-03-23 @ 05:02), Max: 99 (11-03-23 @ 05:02)  HR: 67 (11-03-23 @ 05:02) (61 - 80)  BP: 150/60 (11-03-23 @ 05:02) (111/62 - 163/75)  RR: 16 (11-03-23 @ 05:02) (12 - 25)  SpO2: 100% (11-03-23 @ 05:02) (98% - 100%)    PHYSICAL EXAM:  HEENT:   [ ]Tracheostomy:  [ ]Pupils equal  [ ]No oral lesions  [ ]Abnormal    SKIN  [ ] No Rash  [ ] Abnormal  [ ] pressure    CARDIAC  [ ]Regular  [ ]Abnormal    PULMONARY  [ ]Bilateral Clear Breath Sounds  [ ]Normal Excursion  [ ]Abnormal    GI  [ ]PEG      [ ] +BS		              [ ]Soft, nondistended, nontender	  [ ]Abnormal    MUSCULOSKELETAL                                   [ ]Bedbound                 [ ]Abnormal    [ ]Ambulatory/OOB to chair                           EXTREMITIES                                         [ ]Normal  [ ]Edema                           NEUROLOGIC  [ ] Normal, non focal  [ ] Focal findings:    PSYCHIATRIC  [ ]Alert and appropriate  [ ] Sedated	 [ ]Agitated    :  Mcbride: [ ] Yes, if yes: Date of Placement:                   [  ] No    LINES: Central Lines [ ] Yes, if yes: Date of Placement                                     [  ] No    HOSPITAL MEDICATIONS:  MEDICATIONS  (STANDING):  artificial  tears Solution 2 Drop(s) Both EYES every 6 hours  ascorbic acid 500 milliGRAM(s) Oral daily  calcium carbonate    500 mG (Tums) Chewable 1 Tablet(s) Chew every 12 hours  cefepime   IVPB 2000 milliGRAM(s) IV Intermittent every 8 hours  chlorhexidine 0.12% Liquid 15 milliLiter(s) Oral Mucosa every 12 hours  chlorhexidine 4% Liquid 1 Application(s) Topical <User Schedule>  dextrose 50% Injectable 50 milliLiter(s) IV Push once  enoxaparin Injectable 40 milliGRAM(s) SubCutaneous every 24 hours  escitalopram 10 milliGRAM(s) Oral daily  fentaNYL   Patch  25 MICROgram(s)/Hr 1 Patch Transdermal every 72 hours  gabapentin Solution 100 milliGRAM(s) Enteral Tube at bedtime  insulin glargine Injectable (LANTUS) 14 Unit(s) SubCutaneous every morning  insulin lispro (ADMELOG) corrective regimen sliding scale   SubCutaneous every 6 hours  lactobacillus acidophilus 1 Tablet(s) Oral daily  levothyroxine 100 MICROGram(s) Enteral Tube daily  lidocaine   4% Patch 1 Patch Transdermal daily  multivitamin/minerals/iron Oral Solution (CENTRUM) 15 milliLiter(s) Oral daily  oxybutynin 5 milliGRAM(s) Oral daily  oxycodone    5 mG/acetaminophen 325 mG 1 Tablet(s) Oral every 6 hours  pantoprazole   Suspension 40 milliGRAM(s) Enteral Tube daily  predniSONE   Tablet 5 milliGRAM(s) Oral daily  QUEtiapine 12.5 milliGRAM(s) Oral at bedtime  simethicone 80 milliGRAM(s) Chew four times a day  zinc sulfate 220 milliGRAM(s) Oral daily    MEDICATIONS  (PRN):  acetaminophen   Oral Liquid .. 650 milliGRAM(s) Oral every 6 hours PRN Temp greater or equal to 38C (100.4F), Moderate Pain (4 - 6)  albuterol/ipratropium for Nebulization 3 milliLiter(s) Nebulizer every 6 hours PRN Bronchospasm  sodium chloride 0.65% Nasal 2 Spray(s) Both Nostrils two times a day PRN Nasal Congestion      Mode: AC/ CMV (Assist Control/ Continuous Mandatory Ventilation)  RR (machine): 12  TV (machine): 350  FiO2: 30  PEEP: 5  ITime: 1  MAP: 9  PIP: 30   Patient is a 73y old  Female who presents with a chief complaint of 72 y/o F with PMHx of T2DM, HTN, HLD, anemia, hypothyroidism, RA, fibromyalgia, remote cerebral aneurysm repair, acute hypercapnic respiratory failure now with tracheostomy, PEG tube, and bedbound (trach change 8/18, PEG change 8/14), sacral pressure ulcer, BIBEMS from home for 6 day hx of bleeding from the tracheostomy and PEG tube with associated abdominal pain, recent history of auditory and visual hallucinations, and with chronic sacral decubitus ulcer.    (29 Oct 2023 14:01)    Interval Events: No issues overnight    REVIEW OF SYSTEMS:  [ ] Positive  [x ] All other systems negative  [ ] Unable to assess ROS because ________    Vital Signs Last 24 Hrs  T(C): 37.2 (11-03-23 @ 05:02), Max: 37.2 (11-03-23 @ 05:02)  T(F): 99 (11-03-23 @ 05:02), Max: 99 (11-03-23 @ 05:02)  HR: 67 (11-03-23 @ 05:02) (61 - 80)  BP: 150/60 (11-03-23 @ 05:02) (111/62 - 163/75)  RR: 16 (11-03-23 @ 05:02) (12 - 25)  SpO2: 100% (11-03-23 @ 05:02) (98% - 100%)    PHYSICAL EXAM:  HEENT:   [ X ]Tracheostomy: #8 cuffed shiley  [ X ]Pupils equal  [ ]No oral lesions  [ ]Abnormal    SKIN  [ X ] No Rash  [ X ] Abnormal - sacral wound  [ ] pressure    CARDIAC  [ X ]Regular  [ ]Abnormal    PULMONARY  [ ] Bilateral Clear Breath Sounds  [ ]Normal Excursion  [ X ] Abnormal - R sided rhonchi, diminished BS at b/l bases    GI  [X] PEG      [ X] +BS		              [X] Soft, nondistended, nontender	  [ ]Abnormal    MUSCULOSKELETAL                                   [X] Bedbound                 [ ]Abnormal    [ ]Ambulatory/OOB to chair                           EXTREMITIES                                         [X]Normal  [ ]Edema                           NEUROLOGIC  [X] Normal, non focal  [ ] Focal findings:    PSYCHIATRIC  [X]Alert and appropriate  [ ] Sedated	 [ ]Agitated    :  Mcbride: [ ] Yes, if yes: Date of Placement:                   [X] No    LINES: Central Lines [ ] Yes, if yes: Date of Placement                                     [X] No    HOSPITAL MEDICATIONS:  MEDICATIONS  (STANDING):  artificial  tears Solution 2 Drop(s) Both EYES every 6 hours  ascorbic acid 500 milliGRAM(s) Oral daily  calcium carbonate    500 mG (Tums) Chewable 1 Tablet(s) Chew every 12 hours  cefepime   IVPB 2000 milliGRAM(s) IV Intermittent every 8 hours  chlorhexidine 0.12% Liquid 15 milliLiter(s) Oral Mucosa every 12 hours  chlorhexidine 4% Liquid 1 Application(s) Topical <User Schedule>  dextrose 50% Injectable 50 milliLiter(s) IV Push once  enoxaparin Injectable 40 milliGRAM(s) SubCutaneous every 24 hours  escitalopram 10 milliGRAM(s) Oral daily  fentaNYL   Patch  25 MICROgram(s)/Hr 1 Patch Transdermal every 72 hours  gabapentin Solution 100 milliGRAM(s) Enteral Tube at bedtime  insulin glargine Injectable (LANTUS) 14 Unit(s) SubCutaneous every morning  insulin lispro (ADMELOG) corrective regimen sliding scale   SubCutaneous every 6 hours  lactobacillus acidophilus 1 Tablet(s) Oral daily  levothyroxine 100 MICROGram(s) Enteral Tube daily  lidocaine   4% Patch 1 Patch Transdermal daily  multivitamin/minerals/iron Oral Solution (CENTRUM) 15 milliLiter(s) Oral daily  oxybutynin 5 milliGRAM(s) Oral daily  oxycodone    5 mG/acetaminophen 325 mG 1 Tablet(s) Oral every 6 hours  pantoprazole   Suspension 40 milliGRAM(s) Enteral Tube daily  predniSONE   Tablet 5 milliGRAM(s) Oral daily  QUEtiapine 12.5 milliGRAM(s) Oral at bedtime  simethicone 80 milliGRAM(s) Chew four times a day  zinc sulfate 220 milliGRAM(s) Oral daily    MEDICATIONS  (PRN):  acetaminophen   Oral Liquid .. 650 milliGRAM(s) Oral every 6 hours PRN Temp greater or equal to 38C (100.4F), Moderate Pain (4 - 6)  albuterol/ipratropium for Nebulization 3 milliLiter(s) Nebulizer every 6 hours PRN Bronchospasm  sodium chloride 0.65% Nasal 2 Spray(s) Both Nostrils two times a day PRN Nasal Congestion      Mode: AC/ CMV (Assist Control/ Continuous Mandatory Ventilation)  RR (machine): 12  TV (machine): 350  FiO2: 30  PEEP: 5  ITime: 1  MAP: 9  PIP: 30

## 2023-11-04 LAB
GLUCOSE BLDC GLUCOMTR-MCNC: 107 MG/DL — HIGH (ref 70–99)
GLUCOSE BLDC GLUCOMTR-MCNC: 107 MG/DL — HIGH (ref 70–99)
GLUCOSE BLDC GLUCOMTR-MCNC: 158 MG/DL — HIGH (ref 70–99)
GLUCOSE BLDC GLUCOMTR-MCNC: 158 MG/DL — HIGH (ref 70–99)
GLUCOSE BLDC GLUCOMTR-MCNC: 224 MG/DL — HIGH (ref 70–99)
GLUCOSE BLDC GLUCOMTR-MCNC: 224 MG/DL — HIGH (ref 70–99)
GLUCOSE BLDC GLUCOMTR-MCNC: 94 MG/DL — SIGNIFICANT CHANGE UP (ref 70–99)
GLUCOSE BLDC GLUCOMTR-MCNC: 94 MG/DL — SIGNIFICANT CHANGE UP (ref 70–99)

## 2023-11-04 PROCEDURE — 99233 SBSQ HOSP IP/OBS HIGH 50: CPT | Mod: FS

## 2023-11-04 RX ORDER — LEVOTHYROXINE SODIUM 125 MCG
100 TABLET ORAL
Refills: 0 | Status: DISCONTINUED | OUTPATIENT
Start: 2023-11-04 | End: 2023-11-11

## 2023-11-04 RX ORDER — DIPHENHYDRAMINE HCL 50 MG
25 CAPSULE ORAL EVERY 6 HOURS
Refills: 0 | Status: DISCONTINUED | OUTPATIENT
Start: 2023-11-04 | End: 2024-01-17

## 2023-11-04 RX ORDER — POLYETHYLENE GLYCOL 3350 17 G/17G
17 POWDER, FOR SOLUTION ORAL
Refills: 0 | Status: DISCONTINUED | OUTPATIENT
Start: 2023-11-04 | End: 2023-11-14

## 2023-11-04 RX ADMIN — CHLORHEXIDINE GLUCONATE 1 APPLICATION(S): 213 SOLUTION TOPICAL at 05:32

## 2023-11-04 RX ADMIN — Medication 4: at 12:53

## 2023-11-04 RX ADMIN — Medication 5 MILLIGRAM(S): at 11:21

## 2023-11-04 RX ADMIN — GABAPENTIN 100 MILLIGRAM(S): 400 CAPSULE ORAL at 22:26

## 2023-11-04 RX ADMIN — ESCITALOPRAM OXALATE 10 MILLIGRAM(S): 10 TABLET, FILM COATED ORAL at 11:21

## 2023-11-04 RX ADMIN — ENOXAPARIN SODIUM 40 MILLIGRAM(S): 100 INJECTION SUBCUTANEOUS at 05:30

## 2023-11-04 RX ADMIN — PANTOPRAZOLE SODIUM 40 MILLIGRAM(S): 20 TABLET, DELAYED RELEASE ORAL at 11:21

## 2023-11-04 RX ADMIN — ZINC SULFATE TAB 220 MG (50 MG ZINC EQUIVALENT) 220 MILLIGRAM(S): 220 (50 ZN) TAB at 11:21

## 2023-11-04 RX ADMIN — Medication 100 MICROGRAM(S): at 05:33

## 2023-11-04 RX ADMIN — QUETIAPINE FUMARATE 12.5 MILLIGRAM(S): 200 TABLET, FILM COATED ORAL at 22:25

## 2023-11-04 RX ADMIN — CEFEPIME 100 MILLIGRAM(S): 1 INJECTION, POWDER, FOR SOLUTION INTRAMUSCULAR; INTRAVENOUS at 05:33

## 2023-11-04 RX ADMIN — Medication 2 DROP(S): at 00:01

## 2023-11-04 RX ADMIN — SIMETHICONE 80 MILLIGRAM(S): 80 TABLET, CHEWABLE ORAL at 22:25

## 2023-11-04 RX ADMIN — Medication 2 DROP(S): at 22:26

## 2023-11-04 RX ADMIN — SIMETHICONE 80 MILLIGRAM(S): 80 TABLET, CHEWABLE ORAL at 05:32

## 2023-11-04 RX ADMIN — LIDOCAINE 1 PATCH: 4 CREAM TOPICAL at 19:00

## 2023-11-04 RX ADMIN — Medication 25 MILLIGRAM(S): at 12:51

## 2023-11-04 RX ADMIN — CHLORHEXIDINE GLUCONATE 15 MILLILITER(S): 213 SOLUTION TOPICAL at 05:33

## 2023-11-04 RX ADMIN — Medication 2 DROP(S): at 05:33

## 2023-11-04 RX ADMIN — Medication 5 MILLIGRAM(S): at 05:31

## 2023-11-04 RX ADMIN — Medication 2 DROP(S): at 11:22

## 2023-11-04 RX ADMIN — Medication 2 DROP(S): at 17:55

## 2023-11-04 RX ADMIN — LIDOCAINE 1 PATCH: 4 CREAM TOPICAL at 00:30

## 2023-11-04 RX ADMIN — CEFEPIME 100 MILLIGRAM(S): 1 INJECTION, POWDER, FOR SOLUTION INTRAMUSCULAR; INTRAVENOUS at 22:25

## 2023-11-04 RX ADMIN — SIMETHICONE 80 MILLIGRAM(S): 80 TABLET, CHEWABLE ORAL at 00:01

## 2023-11-04 RX ADMIN — SIMETHICONE 80 MILLIGRAM(S): 80 TABLET, CHEWABLE ORAL at 17:53

## 2023-11-04 RX ADMIN — POLYETHYLENE GLYCOL 3350 17 GRAM(S): 17 POWDER, FOR SOLUTION ORAL at 19:14

## 2023-11-04 RX ADMIN — CHLORHEXIDINE GLUCONATE 15 MILLILITER(S): 213 SOLUTION TOPICAL at 17:54

## 2023-11-04 RX ADMIN — CEFEPIME 100 MILLIGRAM(S): 1 INJECTION, POWDER, FOR SOLUTION INTRAMUSCULAR; INTRAVENOUS at 13:25

## 2023-11-04 RX ADMIN — INSULIN GLARGINE 14 UNIT(S): 100 INJECTION, SOLUTION SUBCUTANEOUS at 11:20

## 2023-11-04 RX ADMIN — Medication 1 TABLET(S): at 17:53

## 2023-11-04 RX ADMIN — Medication 500 MILLIGRAM(S): at 11:20

## 2023-11-04 RX ADMIN — Medication 15 MILLILITER(S): at 11:20

## 2023-11-04 RX ADMIN — Medication 1 TABLET(S): at 11:20

## 2023-11-04 RX ADMIN — SIMETHICONE 80 MILLIGRAM(S): 80 TABLET, CHEWABLE ORAL at 11:21

## 2023-11-04 RX ADMIN — Medication 1 TABLET(S): at 05:32

## 2023-11-04 RX ADMIN — Medication 2: at 00:01

## 2023-11-04 RX ADMIN — LIDOCAINE 1 PATCH: 4 CREAM TOPICAL at 12:52

## 2023-11-04 NOTE — PROGRESS NOTE ADULT - NS ATTEND AMEND GEN_ALL_CORE FT
73F w/ DM, HTN, HLD, anemia, hypothyroidism, RA, fibromyalgia, remote cerebral aneursym repair, hypercapnic respiratory failure s/p tracheostomy/PEG, bedbound, sacral pressure ulcer. Admitted w/ bleeding from tracheostomy and PEG w/ associated abdominal pain, recent hx of auditory/visual hallucinations. Since admission, no further bleeding noted from either trach or PEG. Imaging showed mild thickening along PEG tube tract and sacral ulcer extending to coccyx suggestive of possible OM.     Pt's mental status is at baseline. Psych saw pt, continue Lexapro and Seroquel. Continue percocet, lidocaine patch and Fentanyl patch for pain, w/ Tylenol PRN. Trach to vent, which is baseline, continue PS weaning daily; chest PT for airway clearance. ENT evaluated pt, no acute findings on laryngoscopy; s/p trach change this morning to portex #9. Sputum growing MSSA and pseudomonas, currently on cefepime; noted acute OM on MRI of pelvis - per ID to start PO keflex and cipro after completes 7 days of cefepime. Wound care for sacral wound. Tolerating TF, continue bowel regimen; GI evaluated pt, recommending decreasing feed rate and loosened PEG tube; family requesting swallow evaluation, will obtain. Continue levothyroxine. Continue lanatus and ISS for DM. Prednisone for RA. SCDs for DVT ppx. Full code. D/c likely when finish abx - will likely go home as home care agency will be able to service patient.

## 2023-11-04 NOTE — PROGRESS NOTE ADULT - ASSESSMENT
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 Bleeding from gastrostomy. Site appears macerated. Multifactorial, possible the PEG has been too tight at home.  -PEG c/d/i   -no further bleeding  -wound care following    2. Leakage at PEG site.   -no further leaking     3. Abdominal pain. CT negative for acute pathology.  -rec miralax BID    4. Respiratory   per RCU

## 2023-11-04 NOTE — PROGRESS NOTE ADULT - PROBLEM SELECTOR PLAN 5
- Hold home amlodipine held on admission, may resume if BP remains stable   - Echo from 10/17/2020 notable for hyperdynamic L ventricular systolic function, moderately severe LVOT obstruction, and EF 81% (per Wilkinson calculation) vs 77% (per Teichholz calculation)

## 2023-11-04 NOTE — PROGRESS NOTE ADULT - ASSESSMENT
74 y/o F with PMHx of T2DM, HTN, HLD, anemia, hypothyroidism, RA, fibromyalgia, remote cerebral aneurysm repair, acute hypercapnic respiratory failure now with tracheostomy, PEG tube, and bedbound (trach change 8/18, PEG change 8/14), sacral pressure ulcer, BIBEMS from home for 6 day hx of bleeding from the tracheostomy and PEG tube with associated abdominal pain, recent history of auditory and visual hallucinations, and with chronic sacral decubitus ulcer. Exam notable for mild abdominal distention with LLQ and RLQ tenderness, tracheostomy in place with no obvious bleeding, PEG tube with scant amount of dried blood, at baseline neurologic status. Imaging showing no active bleeding around tracheostomy site, however was notable for mild thickening along PEG tube tract, sacral ulcer extending to the coccyx suggestive of possible osteomyelitis, and bibasilar patchy lung opacities with volume loss. Patient admitted for respiratory support, wound care of tracheostomy and PEG, and management of presumed sacral osteomyelitis.    10/28: Accepted from MICU overnight from ER; Chronic vent dependent from home; Stable on home vent settings. Started Empiric Abx for suspected PNA and possible sacral OM in setting of chronic sacral decubiti. Daughter c/o intermittent oozing (bleeding) from trach/peg site. CT imaging performed, no active bleeding source noted. F/u all Cultures. ID and GI consults called. Will consider ent consult on Mon if further trach bleed. Call Psych cx on Mon.       11/2: Remains on Cefepime for Pseudomonas and S. Aureus pneumonia to finish 11/5 then change to PO Cipro/Keflex x5 weeks. Seen by ENT regarding persistent blood tinged sputum. Added lidocaine patch, percocet standing q6 hrs and changed Tylenol to PRN for pain control.  74 y/o F with PMHx of T2DM, HTN, HLD, anemia, hypothyroidism, RA, fibromyalgia, remote cerebral aneurysm repair, acute hypercapnic respiratory failure now with tracheostomy, PEG tube, and bedbound (trach change 8/18, PEG change 8/14), sacral pressure ulcer, BIBEMS from home for 6 day hx of bleeding from the tracheostomy and PEG tube with associated abdominal pain, recent history of auditory and visual hallucinations, and with chronic sacral decubitus ulcer. Exam notable for mild abdominal distention with LLQ and RLQ tenderness, tracheostomy in place with no obvious bleeding, PEG tube with scant amount of dried blood, at baseline neurologic status. Imaging showing no active bleeding around tracheostomy site, however was notable for mild thickening along PEG tube tract, sacral ulcer extending to the coccyx suggestive of possible osteomyelitis, and bibasilar patchy lung opacities with volume loss. Patient admitted for respiratory support, wound care of tracheostomy and PEG, and management of presumed sacral osteomyelitis.    10/28: Accepted from MICU overnight from ER; Chronic vent dependent from home; Stable on home vent settings. Started Empiric Abx for suspected PNA and possible sacral OM in setting of chronic sacral decubiti. Daughter c/o intermittent oozing (bleeding) from trach/peg site. CT imaging performed, no active bleeding source noted. F/u all Cultures. ID and GI consults called. Will consider ent consult on Mon if further trach bleed. Call Psych cx on Mon.       11/2: Remains on Cefepime for Pseudomonas and S. Aureus pneumonia to finish 11/5 then change to PO Cipro/Keflex x5 weeks. Seen by ENT regarding persistent blood tinged sputum. Added lidocaine patch, percocet standing q6 hrs and changed Tylenol to PRN for pain control.     11/4: No acute events.  Observed small amount of blood tinged sputum with suctioning.  Patient's daughter updated at bedside.  Feeds adjusted to 50ml over 18hr period.  FS in 90s this morning noted, fluctuations may be in part from cefepime dosing.  Insulin may have to be adjusted following cefepime conclusion.  72 y/o F with PMHx of T2DM, HTN, HLD, anemia, hypothyroidism, RA, fibromyalgia, remote cerebral aneurysm repair, acute hypercapnic respiratory failure now with tracheostomy, PEG tube, and bedbound (trach change 8/18, PEG change 8/14), sacral pressure ulcer, BIBEMS from home for 6 day hx of bleeding from the tracheostomy and PEG tube with associated abdominal pain, recent history of auditory and visual hallucinations, and with chronic sacral decubitus ulcer. Exam notable for mild abdominal distention with LLQ and RLQ tenderness, tracheostomy in place with no obvious bleeding, PEG tube with scant amount of dried blood, at baseline neurologic status. Imaging showing no active bleeding around tracheostomy site, however was notable for mild thickening along PEG tube tract, sacral ulcer extending to the coccyx suggestive of possible osteomyelitis, and bibasilar patchy lung opacities with volume loss. Patient admitted for respiratory support, wound care of tracheostomy and PEG, and management of presumed sacral osteomyelitis.    10/28: Accepted from MICU overnight from ER; Chronic vent dependent from home; Stable on home vent settings. Started Empiric Abx for suspected PNA and possible sacral OM in setting of chronic sacral decubiti. Daughter c/o intermittent oozing (bleeding) from trach/peg site. CT imaging performed, no active bleeding source noted. F/u all Cultures. ID and GI consults called. Will consider ent consult on Mon if further trach bleed. Call Psych cx on Mon.       11/2: Remains on Cefepime for Pseudomonas and S. Aureus pneumonia to finish 11/5 then change to PO Cipro/Keflex x5 weeks. Seen by ENT regarding persistent blood tinged sputum. Added lidocaine patch, percocet standing q6 hrs and changed Tylenol to PRN for pain control.     11/4: No acute events.  Observed small amount of blood tinged sputum with suctioning.  Patient's daughter updated at bedside.  Feeds adjusted to 50ml over 18hr period.  FS in 90s this morning noted followed by 224, fluctuations may be in part from cefepime dosing with dextrose.  Insulin may have to be adjusted following cefepime conclusion.  No documented bowel movements since 11/1.  Will make miralax standing, will have to consider movantik for elderly patient on fentanyl patch and oxycodone.

## 2023-11-04 NOTE — PROGRESS NOTE ADULT - SUBJECTIVE AND OBJECTIVE BOX
Patient is a 73y old  Female who presents with a chief complaint of 74 y/o F with PMHx of T2DM, HTN, HLD, anemia, hypothyroidism, RA, fibromyalgia, remote cerebral aneurysm repair, acute hypercapnic respiratory failure now with tracheostomy, PEG tube, and bedbound (trach change 8/18, PEG change 8/14), sacral pressure ulcer, BIBEMS from home for 6 day hx of bleeding from the tracheostomy and PEG tube with associated abdominal pain, recent history of auditory and visual hallucinations, and with chronic sacral decubitus ulcer.    (29 Oct 2023 14:01)      Interval Events:    REVIEW OF SYSTEMS:  [ ] Positive  [ ] All other systems negative  [ ] Unable to assess ROS because ________    Vital Signs Last 24 Hrs  T(C): 37.3 (11-04-23 @ 05:20), Max: 37.7 (11-03-23 @ 21:52)  T(F): 99.2 (11-04-23 @ 05:20), Max: 99.9 (11-03-23 @ 21:52)  HR: 77 (11-04-23 @ 05:20) (60 - 85)  BP: 168/97 (11-04-23 @ 05:20) (112/65 - 168/97)  RR: 22 (11-04-23 @ 05:20) (16 - 22)  SpO2: 100% (11-04-23 @ 05:20) (99% - 100%)    PHYSICAL EXAM:  HEENT:   [ ]Tracheostomy:  [ ]Pupils equal  [ ]No oral lesions  [ ]Abnormal    SKIN  [ ]No Rash  [ ] Abnormal  [ ] pressure    CARDIAC  [ ]Regular  [ ]Abnormal    PULMONARY  [ ]Bilateral Clear Breath Sounds  [ ]Normal Excursion  [ ]Abnormal    GI  [ ]PEG      [ ] +BS		              [ ]Soft, nondistended, nontender	  [ ]Abnormal    MUSCULOSKELETAL                                   [ ]Bedbound                 [ ]Abnormal    [ ]Ambulatory/OOB to chair                           EXTREMITIES                                         [ ]Normal  [ ]Edema                           NEUROLOGIC  [ ] Normal, non focal  [ ] Focal findings:    PSYCHIATRIC  [ ]Alert and appropriate  [ ] Sedated	 [ ]Agitated    :  Mcbride: [ ] Yes, if yes: Date of Placement:                   [  ] No    LINES: Central Lines [ ] Yes, if yes: Date of Placement                                     [  ] No    HOSPITAL MEDICATIONS:  MEDICATIONS  (STANDING):  artificial  tears Solution 2 Drop(s) Both EYES every 6 hours  ascorbic acid 500 milliGRAM(s) Oral daily  calcium carbonate    500 mG (Tums) Chewable 1 Tablet(s) Chew every 12 hours  cefepime   IVPB 2000 milliGRAM(s) IV Intermittent every 8 hours  chlorhexidine 0.12% Liquid 15 milliLiter(s) Oral Mucosa every 12 hours  chlorhexidine 4% Liquid 1 Application(s) Topical <User Schedule>  dextrose 50% Injectable 50 milliLiter(s) IV Push once  enoxaparin Injectable 40 milliGRAM(s) SubCutaneous every 24 hours  escitalopram 10 milliGRAM(s) Oral daily  fentaNYL   Patch  25 MICROgram(s)/Hr 1 Patch Transdermal every 72 hours  gabapentin Solution 100 milliGRAM(s) Enteral Tube at bedtime  insulin glargine Injectable (LANTUS) 14 Unit(s) SubCutaneous every morning  insulin lispro (ADMELOG) corrective regimen sliding scale   SubCutaneous every 6 hours  lactobacillus acidophilus 1 Tablet(s) Oral daily  levothyroxine 100 MICROGram(s) Enteral Tube daily  lidocaine   4% Patch 1 Patch Transdermal daily  multivitamin/minerals/iron Oral Solution (CENTRUM) 15 milliLiter(s) Oral daily  oxybutynin 5 milliGRAM(s) Oral daily  oxycodone    5 mG/acetaminophen 325 mG 1 Tablet(s) Oral every 6 hours  pantoprazole   Suspension 40 milliGRAM(s) Enteral Tube daily  predniSONE   Tablet 5 milliGRAM(s) Oral daily  QUEtiapine 12.5 milliGRAM(s) Oral at bedtime  simethicone 80 milliGRAM(s) Chew four times a day  zinc sulfate 220 milliGRAM(s) Oral daily    MEDICATIONS  (PRN):  acetaminophen   Oral Liquid .. 650 milliGRAM(s) Oral every 6 hours PRN Temp greater or equal to 38C (100.4F), Moderate Pain (4 - 6)  albuterol/ipratropium for Nebulization 3 milliLiter(s) Nebulizer every 6 hours PRN Bronchospasm  sodium chloride 0.65% Nasal 2 Spray(s) Both Nostrils two times a day PRN Nasal Congestion      LABS:                  CAPILLARY BLOOD GLUCOSE    MICROBIOLOGY:     RADIOLOGY:  [ ] Reviewed and interpreted by me    Mode: AC/ CMV (Assist Control/ Continuous Mandatory Ventilation)  RR (machine): 12  TV (machine): 350  FiO2: 30  PEEP: 5  ITime: 1  MAP: 8  PIP: 24   Patient is a 73y old  Female who presents with a chief complaint of 74 y/o F with PMHx of T2DM, HTN, HLD, anemia, hypothyroidism, RA, fibromyalgia, remote cerebral aneurysm repair, acute hypercapnic respiratory failure now with tracheostomy, PEG tube, and bedbound (trach change 8/18, PEG change 8/14), sacral pressure ulcer, BIBEMS from home for 6 day hx of bleeding from the tracheostomy and PEG tube with associated abdominal pain, recent history of auditory and visual hallucinations, and with chronic sacral decubitus ulcer.    (29 Oct 2023 14:01)      Interval Events: No events reported over night.     REVIEW OF SYSTEMS:  [X] Positive:  Intermittent feelings of shortness of breath  [ ] All other systems negative  [ ] Unable to assess ROS because ______    Vital Signs Last 24 Hrs  T(C): 37.3 (11-04-23 @ 05:20), Max: 37.7 (11-03-23 @ 21:52)  T(F): 99.2 (11-04-23 @ 05:20), Max: 99.9 (11-03-23 @ 21:52)  HR: 77 (11-04-23 @ 05:20) (60 - 85)  BP: 168/97 (11-04-23 @ 05:20) (112/65 - 168/97)  RR: 22 (11-04-23 @ 05:20) (16 - 22)  SpO2: 100% (11-04-23 @ 05:20) (99% - 100%)    PHYSICAL EXAM:  HEENT:   [X] Tracheostomy:  #8 Cuffed Portex  [X] 4mm PERRL B/L  [X] No oral lesions  [ ] Abnormal    SKIN  [ ] No Rash  [ ]  Abnormal  [X] pressure: Sacral wound    CARDIAC  [X] Regular  [ ] Abnormal    PULMONARY  [X] Bilateral Clear Breath Sounds  [ ] Normal Excursion  [ ] Abnormal    GI  [X] PEG      [X] +BS		              [X] Soft, nondistended, nontender	  [ ] Abnormal    MUSCULOSKELETAL                                   [  ] Bedbound                 [X] Abnormal:  Deconditioned but can partially move all limbs   [ ] Ambulatory/OOB to chair                           EXTREMITIES                                         [X] Normal  [ ]Edema                           NEUROLOGIC  [ ] Normal, non focal  [X] Focal findings:  Alert and Oriented x2; responds appropriately top questions by mouthing and able to phonation while on vent; B/L foot drop with partial ability to dorsiflex; moves both arms with minimal hand dexterity B/L    PSYCHIATRIC  [X] Alert; somewhat anxious at times but mostly appropriate  [ ] Sedated	 [ ]Agitated    :  Mcbride: [ ] Yes, if yes: Date of Placement:                   [X] No    LINES: Central Lines [ ] Yes, if yes: Date of Placement                                     [X] No    HOSPITAL MEDICATIONS:  MEDICATIONS  (STANDING):  artificial  tears Solution 2 Drop(s) Both EYES every 6 hours  ascorbic acid 500 milliGRAM(s) Oral daily  calcium carbonate    500 mG (Tums) Chewable 1 Tablet(s) Chew every 12 hours  cefepime   IVPB 2000 milliGRAM(s) IV Intermittent every 8 hours  chlorhexidine 0.12% Liquid 15 milliLiter(s) Oral Mucosa every 12 hours  chlorhexidine 4% Liquid 1 Application(s) Topical <User Schedule>  dextrose 50% Injectable 50 milliLiter(s) IV Push once  enoxaparin Injectable 40 milliGRAM(s) SubCutaneous every 24 hours  escitalopram 10 milliGRAM(s) Oral daily  fentaNYL   Patch  25 MICROgram(s)/Hr 1 Patch Transdermal every 72 hours  gabapentin Solution 100 milliGRAM(s) Enteral Tube at bedtime  insulin glargine Injectable (LANTUS) 14 Unit(s) SubCutaneous every morning  insulin lispro (ADMELOG) corrective regimen sliding scale   SubCutaneous every 6 hours  lactobacillus acidophilus 1 Tablet(s) Oral daily  levothyroxine 100 MICROGram(s) Enteral Tube daily  lidocaine   4% Patch 1 Patch Transdermal daily  multivitamin/minerals/iron Oral Solution (CENTRUM) 15 milliLiter(s) Oral daily  oxybutynin 5 milliGRAM(s) Oral daily  oxycodone    5 mG/acetaminophen 325 mG 1 Tablet(s) Oral every 6 hours  pantoprazole   Suspension 40 milliGRAM(s) Enteral Tube daily  predniSONE   Tablet 5 milliGRAM(s) Oral daily  QUEtiapine 12.5 milliGRAM(s) Oral at bedtime  simethicone 80 milliGRAM(s) Chew four times a day  zinc sulfate 220 milliGRAM(s) Oral daily    MEDICATIONS  (PRN):  acetaminophen   Oral Liquid .. 650 milliGRAM(s) Oral every 6 hours PRN Temp greater or equal to 38C (100.4F), Moderate Pain (4 - 6)  albuterol/ipratropium for Nebulization 3 milliLiter(s) Nebulizer every 6 hours PRN Bronchospasm  sodium chloride 0.65% Nasal 2 Spray(s) Both Nostrils two times a day PRN Nasal Congestion      LABS:                  CAPILLARY BLOOD GLUCOSE    MICROBIOLOGY:     RADIOLOGY:  [ ] Reviewed and interpreted by me    Mode: AC/ CMV (Assist Control/ Continuous Mandatory Ventilation)  RR (machine): 12  TV (machine): 350  FiO2: 30  PEEP: 5  ITime: 1  MAP: 8  PIP: 24

## 2023-11-04 NOTE — PROGRESS NOTE ADULT - SUBJECTIVE AND OBJECTIVE BOX
INTERVAL HPI/OVERNIGHT EVENTS:     peg c/d/i          acetaminophen   Oral Liquid .. 650 milliGRAM(s) Oral every 6 hours PRN  albuterol/ipratropium for Nebulization 3 milliLiter(s) Nebulizer every 6 hours PRN  artificial  tears Solution 2 Drop(s) Both EYES every 6 hours  ascorbic acid 500 milliGRAM(s) Oral daily  calcium carbonate    500 mG (Tums) Chewable 1 Tablet(s) Chew every 12 hours  cefepime   IVPB 2000 milliGRAM(s) IV Intermittent every 8 hours  chlorhexidine 0.12% Liquid 15 milliLiter(s) Oral Mucosa every 12 hours  chlorhexidine 4% Liquid 1 Application(s) Topical <User Schedule>  dextrose 50% Injectable 50 milliLiter(s) IV Push once  diphenhydrAMINE Elixir 25 milliGRAM(s) Oral every 6 hours PRN  enoxaparin Injectable 40 milliGRAM(s) SubCutaneous every 24 hours  escitalopram 10 milliGRAM(s) Oral daily  fentaNYL   Patch  25 MICROgram(s)/Hr 1 Patch Transdermal every 72 hours  gabapentin Solution 100 milliGRAM(s) Enteral Tube at bedtime  insulin glargine Injectable (LANTUS) 14 Unit(s) SubCutaneous every morning  insulin lispro (ADMELOG) corrective regimen sliding scale   SubCutaneous every 6 hours  lactobacillus acidophilus 1 Tablet(s) Oral daily  levothyroxine 100 MICROGram(s) Enteral Tube daily  lidocaine   4% Patch 1 Patch Transdermal daily  multivitamin/minerals/iron Oral Solution (CENTRUM) 15 milliLiter(s) Oral daily  oxybutynin 5 milliGRAM(s) Oral daily  oxycodone    5 mG/acetaminophen 325 mG 1 Tablet(s) Oral every 6 hours  pantoprazole   Suspension 40 milliGRAM(s) Enteral Tube daily  predniSONE   Tablet 5 milliGRAM(s) Oral daily  QUEtiapine 12.5 milliGRAM(s) Oral at bedtime  simethicone 80 milliGRAM(s) Chew four times a day  sodium chloride 0.65% Nasal 2 Spray(s) Both Nostrils two times a day PRN  zinc sulfate 220 milliGRAM(s) Oral daily          Hemoglobin: 8.5 g/dL (11-01 @ 06:28)  Hemoglobin: 9.2 g/dL (10-31 @ 06:46)            Creatinine Trend: <0.30<--, <0.30<--, <0.30<--, <0.30<--, <0.30<--, <0.30<--    COAGS:           T(C): 36.4 (11-04-23 @ 11:28), Max: 37.7 (11-03-23 @ 21:52)  HR: 68 (11-04-23 @ 11:28) (60 - 85)  BP: 133/58 (11-04-23 @ 11:28) (112/65 - 168/97)  RR: 20 (11-04-23 @ 09:26) (16 - 22)  SpO2: 100% (11-04-23 @ 11:28) (99% - 100%)  Wt(kg): --    I&O's Summary    03 Nov 2023 07:01  -  04 Nov 2023 07:00  --------------------------------------------------------  IN: 650 mL / OUT: 1000 mL / NET: -350 mL       Allergies    penicillin (Unknown)  heparin (Unknown)  Tagamet (Unknown)  pineapple (Unknown)  walnut (Unknown)  Pecan, Andressa, Hazelnut (Unknown)  Lyrica (Unknown)    Intolerances        Review of Systems: *pt minimally verbal to nonverbal, unable to obtain ROS         PHYSICAL EXAM:    Constitutional: NAD  HEENT: EOMI, throat clear  Neck: No LAD, supple  Respiratory: CTA and P  Cardiovascular: S1 and S2, RRR, no M  Gastrointestinal: BS+, soft, NT/ND, neg HSM, +PEG c/d/i, running, no leakage noted  Extremities: No peripheral edema, neg clubbing, cyanosis  Vascular: 2+ peripheral pulses  Neurological: A/O  Psychiatric: weakened  Skin: No rashes

## 2023-11-05 LAB
GLUCOSE BLDC GLUCOMTR-MCNC: 147 MG/DL — HIGH (ref 70–99)
GLUCOSE BLDC GLUCOMTR-MCNC: 147 MG/DL — HIGH (ref 70–99)
GLUCOSE BLDC GLUCOMTR-MCNC: 153 MG/DL — HIGH (ref 70–99)
GLUCOSE BLDC GLUCOMTR-MCNC: 153 MG/DL — HIGH (ref 70–99)
GLUCOSE BLDC GLUCOMTR-MCNC: 172 MG/DL — HIGH (ref 70–99)
GLUCOSE BLDC GLUCOMTR-MCNC: 172 MG/DL — HIGH (ref 70–99)
GLUCOSE BLDC GLUCOMTR-MCNC: 89 MG/DL — SIGNIFICANT CHANGE UP (ref 70–99)
GLUCOSE BLDC GLUCOMTR-MCNC: 89 MG/DL — SIGNIFICANT CHANGE UP (ref 70–99)

## 2023-11-05 PROCEDURE — 99233 SBSQ HOSP IP/OBS HIGH 50: CPT | Mod: FS

## 2023-11-05 RX ADMIN — Medication 2 DROP(S): at 17:24

## 2023-11-05 RX ADMIN — CEFEPIME 100 MILLIGRAM(S): 1 INJECTION, POWDER, FOR SOLUTION INTRAMUSCULAR; INTRAVENOUS at 21:39

## 2023-11-05 RX ADMIN — CHLORHEXIDINE GLUCONATE 1 APPLICATION(S): 213 SOLUTION TOPICAL at 06:30

## 2023-11-05 RX ADMIN — SIMETHICONE 80 MILLIGRAM(S): 80 TABLET, CHEWABLE ORAL at 21:39

## 2023-11-05 RX ADMIN — CHLORHEXIDINE GLUCONATE 15 MILLILITER(S): 213 SOLUTION TOPICAL at 06:30

## 2023-11-05 RX ADMIN — ENOXAPARIN SODIUM 40 MILLIGRAM(S): 100 INJECTION SUBCUTANEOUS at 06:29

## 2023-11-05 RX ADMIN — Medication 15 MILLILITER(S): at 12:38

## 2023-11-05 RX ADMIN — QUETIAPINE FUMARATE 12.5 MILLIGRAM(S): 200 TABLET, FILM COATED ORAL at 21:39

## 2023-11-05 RX ADMIN — INSULIN GLARGINE 14 UNIT(S): 100 INJECTION, SOLUTION SUBCUTANEOUS at 06:57

## 2023-11-05 RX ADMIN — SIMETHICONE 80 MILLIGRAM(S): 80 TABLET, CHEWABLE ORAL at 06:29

## 2023-11-05 RX ADMIN — Medication 1 TABLET(S): at 17:23

## 2023-11-05 RX ADMIN — POLYETHYLENE GLYCOL 3350 17 GRAM(S): 17 POWDER, FOR SOLUTION ORAL at 06:39

## 2023-11-05 RX ADMIN — Medication 2 DROP(S): at 21:40

## 2023-11-05 RX ADMIN — Medication 650 MILLIGRAM(S): at 21:55

## 2023-11-05 RX ADMIN — CEFEPIME 100 MILLIGRAM(S): 1 INJECTION, POWDER, FOR SOLUTION INTRAMUSCULAR; INTRAVENOUS at 13:21

## 2023-11-05 RX ADMIN — SIMETHICONE 80 MILLIGRAM(S): 80 TABLET, CHEWABLE ORAL at 17:23

## 2023-11-05 RX ADMIN — Medication 5 MILLIGRAM(S): at 06:29

## 2023-11-05 RX ADMIN — Medication 100 MICROGRAM(S): at 08:02

## 2023-11-05 RX ADMIN — Medication 2: at 00:12

## 2023-11-05 RX ADMIN — Medication 2: at 12:40

## 2023-11-05 RX ADMIN — Medication 650 MILLIGRAM(S): at 20:21

## 2023-11-05 RX ADMIN — Medication 2 DROP(S): at 06:30

## 2023-11-05 RX ADMIN — SIMETHICONE 80 MILLIGRAM(S): 80 TABLET, CHEWABLE ORAL at 12:39

## 2023-11-05 RX ADMIN — CHLORHEXIDINE GLUCONATE 15 MILLILITER(S): 213 SOLUTION TOPICAL at 17:25

## 2023-11-05 RX ADMIN — Medication 1 TABLET(S): at 06:29

## 2023-11-05 RX ADMIN — ZINC SULFATE TAB 220 MG (50 MG ZINC EQUIVALENT) 220 MILLIGRAM(S): 220 (50 ZN) TAB at 12:38

## 2023-11-05 RX ADMIN — LIDOCAINE 1 PATCH: 4 CREAM TOPICAL at 00:52

## 2023-11-05 RX ADMIN — Medication 1 TABLET(S): at 12:38

## 2023-11-05 RX ADMIN — LIDOCAINE 1 PATCH: 4 CREAM TOPICAL at 19:54

## 2023-11-05 RX ADMIN — Medication 5 MILLIGRAM(S): at 12:40

## 2023-11-05 RX ADMIN — PANTOPRAZOLE SODIUM 40 MILLIGRAM(S): 20 TABLET, DELAYED RELEASE ORAL at 12:39

## 2023-11-05 RX ADMIN — Medication 2 DROP(S): at 12:39

## 2023-11-05 RX ADMIN — Medication 500 MILLIGRAM(S): at 12:40

## 2023-11-05 RX ADMIN — GABAPENTIN 100 MILLIGRAM(S): 400 CAPSULE ORAL at 21:38

## 2023-11-05 RX ADMIN — POLYETHYLENE GLYCOL 3350 17 GRAM(S): 17 POWDER, FOR SOLUTION ORAL at 17:24

## 2023-11-05 RX ADMIN — CEFEPIME 100 MILLIGRAM(S): 1 INJECTION, POWDER, FOR SOLUTION INTRAMUSCULAR; INTRAVENOUS at 06:39

## 2023-11-05 RX ADMIN — ESCITALOPRAM OXALATE 10 MILLIGRAM(S): 10 TABLET, FILM COATED ORAL at 12:38

## 2023-11-05 RX ADMIN — LIDOCAINE 1 PATCH: 4 CREAM TOPICAL at 12:39

## 2023-11-05 NOTE — PROGRESS NOTE ADULT - SUBJECTIVE AND OBJECTIVE BOX
Patient is a 73y old  Female who presents with a chief complaint of 74 y/o F with PMHx of T2DM, HTN, HLD, anemia, hypothyroidism, RA, fibromyalgia, remote cerebral aneurysm repair, acute hypercapnic respiratory failure now with tracheostomy, PEG tube, and bedbound (trach change 8/18, PEG change 8/14), sacral pressure ulcer, BIBEMS from home for 6 day hx of bleeding from the tracheostomy and PEG tube with associated abdominal pain, recent history of auditory and visual hallucinations, and with chronic sacral decubitus ulcer.    (29 Oct 2023 14:01)      Interval Events:    REVIEW OF SYSTEMS:  [ ] Positive  [ ] All other systems negative  [ ] Unable to assess ROS because ________    Vital Signs Last 24 Hrs  T(C): 37.7 (11-05-23 @ 04:23), Max: 37.7 (11-05-23 @ 04:23)  T(F): 99.8 (11-05-23 @ 04:23), Max: 99.8 (11-05-23 @ 04:23)  HR: 68 (11-05-23 @ 04:23) (61 - 72)  BP: 136/64 (11-05-23 @ 04:23) (133/58 - 158/72)  RR: 16 (11-05-23 @ 04:23) (14 - 27)  SpO2: 100% (11-05-23 @ 04:23) (100% - 100%)    PHYSICAL EXAM:  HEENT:   [X] Tracheostomy:  #8 Cuffed Portex  [X] 4mm PERRL B/L  [X] No oral lesions  [ ] Abnormal    SKIN  [ ] No Rash  [ ]  Abnormal  [X] pressure: Sacral wound    CARDIAC  [X] Regular  [ ] Abnormal    PULMONARY  [X] Bilateral Clear Breath Sounds  [ ] Normal Excursion  [ ] Abnormal    GI  [X] PEG      [X] +BS		              [X] Soft, nondistended, nontender	  [ ] Abnormal    MUSCULOSKELETAL                                   [  ] Bedbound                 [X] Abnormal:  Deconditioned but can partially move all limbs   [ ] Ambulatory/OOB to chair                           EXTREMITIES                                         [X] Normal  [ ]Edema                           NEUROLOGIC  [ ] Normal, non focal  [X] Focal findings:  Alert and Oriented x2; responds appropriately top questions by mouthing and able to phonation while on vent; B/L foot drop with partial ability to dorsiflex; moves both arms with minimal hand dexterity B/L    PSYCHIATRIC  [X] Alert; somewhat anxious at times but mostly appropriate  [ ] Sedated	 [ ]Agitated    :  Mcbride: [ ] Yes, if yes: Date of Placement:                   [X] No    LINES: Central Lines [ ] Yes, if yes: Date of Placement                                     [X] No    HOSPITAL MEDICATIONS:  MEDICATIONS  (STANDING):  artificial  tears Solution 2 Drop(s) Both EYES every 6 hours  ascorbic acid 500 milliGRAM(s) Oral daily  calcium carbonate    500 mG (Tums) Chewable 1 Tablet(s) Chew every 12 hours  cefepime   IVPB 2000 milliGRAM(s) IV Intermittent every 8 hours  chlorhexidine 0.12% Liquid 15 milliLiter(s) Oral Mucosa every 12 hours  chlorhexidine 4% Liquid 1 Application(s) Topical <User Schedule>  dextrose 50% Injectable 50 milliLiter(s) IV Push once  enoxaparin Injectable 40 milliGRAM(s) SubCutaneous every 24 hours  escitalopram 10 milliGRAM(s) Oral daily  fentaNYL   Patch  25 MICROgram(s)/Hr 1 Patch Transdermal every 72 hours  gabapentin Solution 100 milliGRAM(s) Enteral Tube at bedtime  insulin glargine Injectable (LANTUS) 14 Unit(s) SubCutaneous every morning  insulin lispro (ADMELOG) corrective regimen sliding scale   SubCutaneous every 6 hours  lactobacillus acidophilus 1 Tablet(s) Oral daily  levothyroxine 100 MICROGram(s) Enteral Tube <User Schedule>  lidocaine   4% Patch 1 Patch Transdermal daily  multivitamin/minerals/iron Oral Solution (CENTRUM) 15 milliLiter(s) Oral daily  oxybutynin 5 milliGRAM(s) Oral daily  oxycodone    5 mG/acetaminophen 325 mG 1 Tablet(s) Oral every 6 hours  pantoprazole   Suspension 40 milliGRAM(s) Enteral Tube daily  polyethylene glycol 3350 17 Gram(s) Oral two times a day  predniSONE   Tablet 5 milliGRAM(s) Oral daily  QUEtiapine 12.5 milliGRAM(s) Oral at bedtime  simethicone 80 milliGRAM(s) Chew four times a day  zinc sulfate 220 milliGRAM(s) Oral daily    MEDICATIONS  (PRN):  acetaminophen   Oral Liquid .. 650 milliGRAM(s) Oral every 6 hours PRN Temp greater or equal to 38C (100.4F), Moderate Pain (4 - 6)  albuterol/ipratropium for Nebulization 3 milliLiter(s) Nebulizer every 6 hours PRN Bronchospasm  diphenhydrAMINE Elixir 25 milliGRAM(s) Oral every 6 hours PRN Rash and/or Itching  sodium chloride 0.65% Nasal 2 Spray(s) Both Nostrils two times a day PRN Nasal Congestion      LABS:                  CAPILLARY BLOOD GLUCOSE    MICROBIOLOGY:     RADIOLOGY:  [ ] Reviewed and interpreted by me    Mode: AC/ CMV (Assist Control/ Continuous Mandatory Ventilation)  RR (machine): 12  TV (machine): 350  FiO2: 30  PEEP: 5  PS:   ITime: 1  MAP: 8  PIP: 28

## 2023-11-05 NOTE — PROGRESS NOTE ADULT - ASSESSMENT
74 y/o F with PMHx of T2DM, HTN, HLD, anemia, hypothyroidism, RA, fibromyalgia, remote cerebral aneurysm repair, acute hypercapnic respiratory failure now with tracheostomy, PEG tube, and bedbound (trach change 8/18, PEG change 8/14), sacral pressure ulcer, BIBEMS from home for 6 day hx of bleeding from the tracheostomy and PEG tube with associated abdominal pain, recent history of auditory and visual hallucinations, and with chronic sacral decubitus ulcer. Exam notable for mild abdominal distention with LLQ and RLQ tenderness, tracheostomy in place with no obvious bleeding, PEG tube with scant amount of dried blood, at baseline neurologic status. Imaging showing no active bleeding around tracheostomy site, however was notable for mild thickening along PEG tube tract, sacral ulcer extending to the coccyx suggestive of possible osteomyelitis, and bibasilar patchy lung opacities with volume loss. Patient admitted for respiratory support, wound care of tracheostomy and PEG, and management of presumed sacral osteomyelitis.

## 2023-11-05 NOTE — PROGRESS NOTE ADULT - NS ATTEND AMEND GEN_ALL_CORE FT
-----------------------73F w/ DM, HTN, HLD, anemia, hypothyroidism, RA, fibromyalgia, remote cerebral aneursym repair, hypercapnic respiratory failure s/p tracheostomy/PEG, bedbound, sacral pressure ulcer. Admitted w/ bleeding from tracheostomy and PEG w/ associated abdominal pain, recent hx of auditory/visual hallucinations. Since admission, no further bleeding noted from either trach or PEG. Imaging showed mild thickening along PEG tube tract and sacral ulcer extending to coccyx suggestive of possible OM.     Pt's mental status is at baseline. Psych saw pt, continue Lexapro and Seroquel. Continue percocet, lidocaine patch and Fentanyl patch for pain, w/ Tylenol PRN. Trach to vent, which is baseline, continue PS weaning daily; chest PT for airway clearance. ENT evaluated pt, no acute findings on laryngoscopy; s/p trach change this morning to portex #9. Sputum growing MSSA and pseudomonas, currently on cefepime; noted acute OM on MRI of pelvis - per ID to start PO keflex and cipro after completes 7 days of cefepime. Wound care for sacral wound. Tolerating TF, continue bowel regimen; GI evaluated pt, recommending decreasing feed rate and loosened PEG tube; family requesting swallow evaluation, will obtain. Continue levothyroxine. Continue lanatus and ISS for DM. Prednisone for RA. SCDs for DVT ppx. Full code. D/c likely when finish abx - will likely go home as home care agency will be able to service patient.

## 2023-11-06 ENCOUNTER — TRANSCRIPTION ENCOUNTER (OUTPATIENT)
Age: 73
End: 2023-11-06

## 2023-11-06 LAB
ANION GAP SERPL CALC-SCNC: 13 MMOL/L — SIGNIFICANT CHANGE UP (ref 5–17)
ANION GAP SERPL CALC-SCNC: 13 MMOL/L — SIGNIFICANT CHANGE UP (ref 5–17)
APPEARANCE UR: CLEAR — SIGNIFICANT CHANGE UP
APPEARANCE UR: CLEAR — SIGNIFICANT CHANGE UP
BACTERIA # UR AUTO: ABNORMAL /HPF
BACTERIA # UR AUTO: ABNORMAL /HPF
BASOPHILS # BLD AUTO: 0 K/UL — SIGNIFICANT CHANGE UP (ref 0–0.2)
BASOPHILS # BLD AUTO: 0 K/UL — SIGNIFICANT CHANGE UP (ref 0–0.2)
BASOPHILS NFR BLD AUTO: 0 % — SIGNIFICANT CHANGE UP (ref 0–2)
BASOPHILS NFR BLD AUTO: 0 % — SIGNIFICANT CHANGE UP (ref 0–2)
BILIRUB UR-MCNC: NEGATIVE — SIGNIFICANT CHANGE UP
BILIRUB UR-MCNC: NEGATIVE — SIGNIFICANT CHANGE UP
BUN SERPL-MCNC: 20 MG/DL — SIGNIFICANT CHANGE UP (ref 7–23)
BUN SERPL-MCNC: 20 MG/DL — SIGNIFICANT CHANGE UP (ref 7–23)
CALCIUM SERPL-MCNC: 9.2 MG/DL — SIGNIFICANT CHANGE UP (ref 8.4–10.5)
CALCIUM SERPL-MCNC: 9.2 MG/DL — SIGNIFICANT CHANGE UP (ref 8.4–10.5)
CAST: 0 /LPF — SIGNIFICANT CHANGE UP (ref 0–4)
CAST: 0 /LPF — SIGNIFICANT CHANGE UP (ref 0–4)
CHLORIDE SERPL-SCNC: 102 MMOL/L — SIGNIFICANT CHANGE UP (ref 96–108)
CHLORIDE SERPL-SCNC: 102 MMOL/L — SIGNIFICANT CHANGE UP (ref 96–108)
CO2 SERPL-SCNC: 22 MMOL/L — SIGNIFICANT CHANGE UP (ref 22–31)
CO2 SERPL-SCNC: 22 MMOL/L — SIGNIFICANT CHANGE UP (ref 22–31)
COLOR SPEC: YELLOW — SIGNIFICANT CHANGE UP
COLOR SPEC: YELLOW — SIGNIFICANT CHANGE UP
CREAT SERPL-MCNC: <0.3 MG/DL — LOW (ref 0.5–1.3)
CREAT SERPL-MCNC: <0.3 MG/DL — LOW (ref 0.5–1.3)
DIFF PNL FLD: NEGATIVE — SIGNIFICANT CHANGE UP
DIFF PNL FLD: NEGATIVE — SIGNIFICANT CHANGE UP
EGFR: 113 ML/MIN/1.73M2 — SIGNIFICANT CHANGE UP
EGFR: 113 ML/MIN/1.73M2 — SIGNIFICANT CHANGE UP
EOSINOPHIL # BLD AUTO: 0 K/UL — SIGNIFICANT CHANGE UP (ref 0–0.5)
EOSINOPHIL # BLD AUTO: 0 K/UL — SIGNIFICANT CHANGE UP (ref 0–0.5)
EOSINOPHIL NFR BLD AUTO: 0 % — SIGNIFICANT CHANGE UP (ref 0–6)
EOSINOPHIL NFR BLD AUTO: 0 % — SIGNIFICANT CHANGE UP (ref 0–6)
GIANT PLATELETS BLD QL SMEAR: PRESENT — SIGNIFICANT CHANGE UP
GIANT PLATELETS BLD QL SMEAR: PRESENT — SIGNIFICANT CHANGE UP
GLUCOSE BLDC GLUCOMTR-MCNC: 119 MG/DL — HIGH (ref 70–99)
GLUCOSE BLDC GLUCOMTR-MCNC: 119 MG/DL — HIGH (ref 70–99)
GLUCOSE BLDC GLUCOMTR-MCNC: 131 MG/DL — HIGH (ref 70–99)
GLUCOSE BLDC GLUCOMTR-MCNC: 131 MG/DL — HIGH (ref 70–99)
GLUCOSE BLDC GLUCOMTR-MCNC: 161 MG/DL — HIGH (ref 70–99)
GLUCOSE BLDC GLUCOMTR-MCNC: 161 MG/DL — HIGH (ref 70–99)
GLUCOSE SERPL-MCNC: 156 MG/DL — HIGH (ref 70–99)
GLUCOSE SERPL-MCNC: 156 MG/DL — HIGH (ref 70–99)
GLUCOSE UR QL: NEGATIVE MG/DL — SIGNIFICANT CHANGE UP
GLUCOSE UR QL: NEGATIVE MG/DL — SIGNIFICANT CHANGE UP
GRAM STN FLD: ABNORMAL
GRAM STN FLD: ABNORMAL
HCT VFR BLD CALC: 30.9 % — LOW (ref 34.5–45)
HCT VFR BLD CALC: 30.9 % — LOW (ref 34.5–45)
HGB BLD-MCNC: 9.5 G/DL — LOW (ref 11.5–15.5)
HGB BLD-MCNC: 9.5 G/DL — LOW (ref 11.5–15.5)
KETONES UR-MCNC: ABNORMAL MG/DL
KETONES UR-MCNC: ABNORMAL MG/DL
LEUKOCYTE ESTERASE UR-ACNC: NEGATIVE — SIGNIFICANT CHANGE UP
LEUKOCYTE ESTERASE UR-ACNC: NEGATIVE — SIGNIFICANT CHANGE UP
LYMPHOCYTES # BLD AUTO: 1.71 K/UL — SIGNIFICANT CHANGE UP (ref 1–3.3)
LYMPHOCYTES # BLD AUTO: 1.71 K/UL — SIGNIFICANT CHANGE UP (ref 1–3.3)
LYMPHOCYTES # BLD AUTO: 22.6 % — SIGNIFICANT CHANGE UP (ref 13–44)
LYMPHOCYTES # BLD AUTO: 22.6 % — SIGNIFICANT CHANGE UP (ref 13–44)
MAGNESIUM SERPL-MCNC: 2 MG/DL — SIGNIFICANT CHANGE UP (ref 1.6–2.6)
MAGNESIUM SERPL-MCNC: 2 MG/DL — SIGNIFICANT CHANGE UP (ref 1.6–2.6)
MANUAL SMEAR VERIFICATION: SIGNIFICANT CHANGE UP
MANUAL SMEAR VERIFICATION: SIGNIFICANT CHANGE UP
MCHC RBC-ENTMCNC: 28.4 PG — SIGNIFICANT CHANGE UP (ref 27–34)
MCHC RBC-ENTMCNC: 28.4 PG — SIGNIFICANT CHANGE UP (ref 27–34)
MCHC RBC-ENTMCNC: 30.7 GM/DL — LOW (ref 32–36)
MCHC RBC-ENTMCNC: 30.7 GM/DL — LOW (ref 32–36)
MCV RBC AUTO: 92.5 FL — SIGNIFICANT CHANGE UP (ref 80–100)
MCV RBC AUTO: 92.5 FL — SIGNIFICANT CHANGE UP (ref 80–100)
MONOCYTES # BLD AUTO: 0.26 K/UL — SIGNIFICANT CHANGE UP (ref 0–0.9)
MONOCYTES # BLD AUTO: 0.26 K/UL — SIGNIFICANT CHANGE UP (ref 0–0.9)
MONOCYTES NFR BLD AUTO: 3.5 % — SIGNIFICANT CHANGE UP (ref 2–14)
MONOCYTES NFR BLD AUTO: 3.5 % — SIGNIFICANT CHANGE UP (ref 2–14)
MYELOCYTES NFR BLD: 4.3 % — HIGH (ref 0–0)
MYELOCYTES NFR BLD: 4.3 % — HIGH (ref 0–0)
NEUTROPHILS # BLD AUTO: 5.25 K/UL — SIGNIFICANT CHANGE UP (ref 1.8–7.4)
NEUTROPHILS # BLD AUTO: 5.25 K/UL — SIGNIFICANT CHANGE UP (ref 1.8–7.4)
NEUTROPHILS NFR BLD AUTO: 65.2 % — SIGNIFICANT CHANGE UP (ref 43–77)
NEUTROPHILS NFR BLD AUTO: 65.2 % — SIGNIFICANT CHANGE UP (ref 43–77)
NEUTS BAND # BLD: 4.4 % — SIGNIFICANT CHANGE UP (ref 0–8)
NEUTS BAND # BLD: 4.4 % — SIGNIFICANT CHANGE UP (ref 0–8)
NITRITE UR-MCNC: NEGATIVE — SIGNIFICANT CHANGE UP
NITRITE UR-MCNC: NEGATIVE — SIGNIFICANT CHANGE UP
NRBC # BLD: 1 /100 — HIGH (ref 0–0)
NRBC # BLD: 1 /100 — HIGH (ref 0–0)
PH UR: 6 — SIGNIFICANT CHANGE UP (ref 5–8)
PH UR: 6 — SIGNIFICANT CHANGE UP (ref 5–8)
PHOSPHATE SERPL-MCNC: 4 MG/DL — SIGNIFICANT CHANGE UP (ref 2.5–4.5)
PHOSPHATE SERPL-MCNC: 4 MG/DL — SIGNIFICANT CHANGE UP (ref 2.5–4.5)
PLAT MORPH BLD: ABNORMAL
PLAT MORPH BLD: ABNORMAL
PLATELET # BLD AUTO: 107 K/UL — LOW (ref 150–400)
PLATELET # BLD AUTO: 107 K/UL — LOW (ref 150–400)
POTASSIUM SERPL-MCNC: 4.3 MMOL/L — SIGNIFICANT CHANGE UP (ref 3.5–5.3)
POTASSIUM SERPL-MCNC: 4.3 MMOL/L — SIGNIFICANT CHANGE UP (ref 3.5–5.3)
POTASSIUM SERPL-SCNC: 4.3 MMOL/L — SIGNIFICANT CHANGE UP (ref 3.5–5.3)
POTASSIUM SERPL-SCNC: 4.3 MMOL/L — SIGNIFICANT CHANGE UP (ref 3.5–5.3)
PROT UR-MCNC: 30 MG/DL
PROT UR-MCNC: 30 MG/DL
RAPID RVP RESULT: SIGNIFICANT CHANGE UP
RAPID RVP RESULT: SIGNIFICANT CHANGE UP
RBC # BLD: 3.34 M/UL — LOW (ref 3.8–5.2)
RBC # BLD: 3.34 M/UL — LOW (ref 3.8–5.2)
RBC # FLD: 19 % — HIGH (ref 10.3–14.5)
RBC # FLD: 19 % — HIGH (ref 10.3–14.5)
RBC BLD AUTO: SIGNIFICANT CHANGE UP
RBC BLD AUTO: SIGNIFICANT CHANGE UP
RBC CASTS # UR COMP ASSIST: 2 /HPF — SIGNIFICANT CHANGE UP (ref 0–4)
RBC CASTS # UR COMP ASSIST: 2 /HPF — SIGNIFICANT CHANGE UP (ref 0–4)
SARS-COV-2 RNA SPEC QL NAA+PROBE: SIGNIFICANT CHANGE UP
SARS-COV-2 RNA SPEC QL NAA+PROBE: SIGNIFICANT CHANGE UP
SODIUM SERPL-SCNC: 137 MMOL/L — SIGNIFICANT CHANGE UP (ref 135–145)
SODIUM SERPL-SCNC: 137 MMOL/L — SIGNIFICANT CHANGE UP (ref 135–145)
SP GR SPEC: 1.03 — SIGNIFICANT CHANGE UP (ref 1–1.03)
SP GR SPEC: 1.03 — SIGNIFICANT CHANGE UP (ref 1–1.03)
SPECIMEN SOURCE: SIGNIFICANT CHANGE UP
SPECIMEN SOURCE: SIGNIFICANT CHANGE UP
SQUAMOUS # UR AUTO: 0 /HPF — SIGNIFICANT CHANGE UP (ref 0–5)
SQUAMOUS # UR AUTO: 0 /HPF — SIGNIFICANT CHANGE UP (ref 0–5)
UROBILINOGEN FLD QL: 0.2 MG/DL — SIGNIFICANT CHANGE UP (ref 0.2–1)
UROBILINOGEN FLD QL: 0.2 MG/DL — SIGNIFICANT CHANGE UP (ref 0.2–1)
WBC # BLD: 7.55 K/UL — SIGNIFICANT CHANGE UP (ref 3.8–10.5)
WBC # BLD: 7.55 K/UL — SIGNIFICANT CHANGE UP (ref 3.8–10.5)
WBC # FLD AUTO: 7.55 K/UL — SIGNIFICANT CHANGE UP (ref 3.8–10.5)
WBC # FLD AUTO: 7.55 K/UL — SIGNIFICANT CHANGE UP (ref 3.8–10.5)
WBC UR QL: 0 /HPF — SIGNIFICANT CHANGE UP (ref 0–5)
WBC UR QL: 0 /HPF — SIGNIFICANT CHANGE UP (ref 0–5)

## 2023-11-06 PROCEDURE — 99232 SBSQ HOSP IP/OBS MODERATE 35: CPT

## 2023-11-06 PROCEDURE — 99233 SBSQ HOSP IP/OBS HIGH 50: CPT

## 2023-11-06 RX ADMIN — CHLORHEXIDINE GLUCONATE 15 MILLILITER(S): 213 SOLUTION TOPICAL at 17:19

## 2023-11-06 RX ADMIN — POLYETHYLENE GLYCOL 3350 17 GRAM(S): 17 POWDER, FOR SOLUTION ORAL at 17:51

## 2023-11-06 RX ADMIN — Medication 5 MILLIGRAM(S): at 11:47

## 2023-11-06 RX ADMIN — Medication 500 MILLIGRAM(S): at 11:47

## 2023-11-06 RX ADMIN — Medication 500 MILLIGRAM(S): at 05:19

## 2023-11-06 RX ADMIN — Medication 1 TABLET(S): at 11:47

## 2023-11-06 RX ADMIN — Medication 500 MILLIGRAM(S): at 12:26

## 2023-11-06 RX ADMIN — Medication 650 MILLIGRAM(S): at 22:42

## 2023-11-06 RX ADMIN — LIDOCAINE 1 PATCH: 4 CREAM TOPICAL at 20:00

## 2023-11-06 RX ADMIN — ESCITALOPRAM OXALATE 10 MILLIGRAM(S): 10 TABLET, FILM COATED ORAL at 11:47

## 2023-11-06 RX ADMIN — Medication 500 MILLIGRAM(S): at 05:18

## 2023-11-06 RX ADMIN — Medication 650 MILLIGRAM(S): at 02:25

## 2023-11-06 RX ADMIN — LIDOCAINE 1 PATCH: 4 CREAM TOPICAL at 11:48

## 2023-11-06 RX ADMIN — Medication 100 MICROGRAM(S): at 05:19

## 2023-11-06 RX ADMIN — Medication 650 MILLIGRAM(S): at 22:12

## 2023-11-06 RX ADMIN — CHLORHEXIDINE GLUCONATE 15 MILLILITER(S): 213 SOLUTION TOPICAL at 05:20

## 2023-11-06 RX ADMIN — Medication 500 MILLIGRAM(S): at 01:16

## 2023-11-06 RX ADMIN — Medication 2 DROP(S): at 05:19

## 2023-11-06 RX ADMIN — Medication 2 DROP(S): at 17:19

## 2023-11-06 RX ADMIN — SIMETHICONE 80 MILLIGRAM(S): 80 TABLET, CHEWABLE ORAL at 05:20

## 2023-11-06 RX ADMIN — ZINC SULFATE TAB 220 MG (50 MG ZINC EQUIVALENT) 220 MILLIGRAM(S): 220 (50 ZN) TAB at 11:48

## 2023-11-06 RX ADMIN — CHLORHEXIDINE GLUCONATE 1 APPLICATION(S): 213 SOLUTION TOPICAL at 05:20

## 2023-11-06 RX ADMIN — Medication 500 MILLIGRAM(S): at 17:24

## 2023-11-06 RX ADMIN — Medication 2: at 11:56

## 2023-11-06 RX ADMIN — SIMETHICONE 80 MILLIGRAM(S): 80 TABLET, CHEWABLE ORAL at 11:47

## 2023-11-06 RX ADMIN — Medication 650 MILLIGRAM(S): at 02:55

## 2023-11-06 RX ADMIN — ENOXAPARIN SODIUM 40 MILLIGRAM(S): 100 INJECTION SUBCUTANEOUS at 05:18

## 2023-11-06 RX ADMIN — Medication 5 MILLIGRAM(S): at 05:19

## 2023-11-06 RX ADMIN — INSULIN GLARGINE 14 UNIT(S): 100 INJECTION, SOLUTION SUBCUTANEOUS at 05:18

## 2023-11-06 RX ADMIN — GABAPENTIN 100 MILLIGRAM(S): 400 CAPSULE ORAL at 22:12

## 2023-11-06 RX ADMIN — SIMETHICONE 80 MILLIGRAM(S): 80 TABLET, CHEWABLE ORAL at 17:18

## 2023-11-06 RX ADMIN — LIDOCAINE 1 PATCH: 4 CREAM TOPICAL at 00:00

## 2023-11-06 RX ADMIN — Medication 500 MILLIGRAM(S): at 17:18

## 2023-11-06 RX ADMIN — PANTOPRAZOLE SODIUM 40 MILLIGRAM(S): 20 TABLET, DELAYED RELEASE ORAL at 11:48

## 2023-11-06 RX ADMIN — Medication 2 DROP(S): at 11:47

## 2023-11-06 RX ADMIN — Medication 1 TABLET(S): at 17:18

## 2023-11-06 RX ADMIN — Medication 1 TABLET(S): at 05:19

## 2023-11-06 RX ADMIN — Medication 15 MILLILITER(S): at 11:48

## 2023-11-06 RX ADMIN — QUETIAPINE FUMARATE 12.5 MILLIGRAM(S): 200 TABLET, FILM COATED ORAL at 22:13

## 2023-11-06 NOTE — DISCHARGE NOTE PROVIDER - ATTENDING DISCHARGE PHYSICAL EXAMINATION:
73F with a h/o DM, HTN, HLD, anemia, hypothyroidism, RA, fibromyalgia, remote cerebral aneurysm with repair, hypercapnic respiratory failure s/p tracheostomy/PEG, bedbound, sacral pressure ulcer. Admitted w/ bleeding from tracheostomy and PEG w/ associated abdominal pain, recent hx of auditory/visual hallucinations.   Imaging showed mild thickening along PEG tube tract and sacral ulcer extending to coccyx suggestive of acute osteomyelitis.      # Osteomyelitis  # Chronic hypoxemic RF  # oropharyngeal dysphagia  # acute on chronic pain  # Trach/Peg bleeding  # Tracheitis with Pseudomonas and serratia  # Hx of hallucination, anxiety     On physical exam she is awake, breathing comfortably on ventilator, tolerating PS trial of 15/5, bilateral breath sounds present, abd soft, non tender non distended, extremities warm and well perfused .    #Neuro - awake, alert, and interactive. moving all extremities, mouthing words. continue current medications  #Cardiovascular - hemodynamically stable  #Pulmonary - chronic hypoxemic respiratory failure, has 8XLT trach, on home vent, pressure support trials daily.   No further hemoptysis after TXA nebs, Minimize suctioning as needed  #Gastro - oropharyngeal dysphagia with PEG tube in place, tolerating feeds, nutrition follow up appreciated   Appreciate GI eval of PEG site  #ID - sacral osteo on MRI - wound care follow-up appreciated, c/w offloading and local wound care  -The patient has had this wound since a hospitalization in December 2022 and per daughter does not have chronic or multiple wounds but only this single wound.   - s/p 6 week course of Cipro and Keflex as per ID - completed 12/10/23  - afebrile off abx today   #Hem/Onc - prior cytopenias in setting of antibiotics per patient's daughter, stable Hb today   - Hem/Onc follow-up noted - suspect an anemia of chronic disease  #Pain - continue current pain control - in setting of fibromyalgia and pain from wound  - Voltaren topical, lidocaine patches and low dose Oxycodone as needed  #Derm - previously seen by derm for skin lesions - mid back with irritated seborrheic keratosis - outpatient follow-up  #Endo - DM2 - continue insulin and adjust as needed for goal FS < 180  - Endocrine follow-up appreciated   - Continue Synthroid - TSH WNL  #DVT proph - SCDs

## 2023-11-06 NOTE — PROGRESS NOTE ADULT - SUBJECTIVE AND OBJECTIVE BOX
[facility]-- WOUND TEAM -- FOLLOW UP NOTE  --------------------------------------------------------------------------------    24 hour events/subjective:          Diet:  Diet, NPO with Tube Feed:   Tube Feeding Modality: Gastrostomy  Casie Black Peptide 1.5  Total Volume for 24 Hours (mL): 900  Continuous  Starting Tube Feed Rate mL per Hour: 50  Increase Tube Feed Rate by (mL): 0  Until Goal Tube Feed Rate (mL per Hour): 50  Tube Feed Duration (in Hours): 18  Tube Feed Start Time: 06:00  Tube Feed Stop Time: 23:59  Free Water Flush  Pump   Rate (mL per Hour): 10   Frequency: Every 2 Hours  Liquid Protein Supplement     Qty per Day:  1 (11-04-23 @ 17:04)      ROS: pt unable to offer    ALLERGIES & MEDICATIONS  --------------------------------------------------------------------------------  Allergies  penicillin (Unknown)  heparin (Unknown)  Tagamet (Unknown)  pineapple (Unknown)  walnut (Unknown)  Andressa Rawls Hazelnut (Unknown)  Lyrica (Unknown)        STANDING INPATIENT MEDICATIONS  artificial  tears Solution 2 Drop(s) Both EYES every 6 hours  ascorbic acid 500 milliGRAM(s) Oral daily  calcium carbonate    500 mG (Tums) Chewable 1 Tablet(s) Chew every 12 hours  cephalexin 500 milliGRAM(s) Oral every 6 hours  chlorhexidine 0.12% Liquid 15 milliLiter(s) Oral Mucosa every 12 hours  chlorhexidine 4% Liquid 1 Application(s) Topical <User Schedule>  ciprofloxacin     Tablet 500 milliGRAM(s) Oral every 12 hours  dextrose 50% Injectable 50 milliLiter(s) IV Push once  enoxaparin Injectable 40 milliGRAM(s) SubCutaneous every 24 hours  escitalopram 10 milliGRAM(s) Oral daily  fentaNYL   Patch  25 MICROgram(s)/Hr 1 Patch Transdermal every 72 hours  gabapentin Solution 100 milliGRAM(s) Enteral Tube at bedtime  insulin glargine Injectable (LANTUS) 14 Unit(s) SubCutaneous every morning  insulin lispro (ADMELOG) corrective regimen sliding scale   SubCutaneous every 6 hours  lactobacillus acidophilus 1 Tablet(s) Oral daily  levothyroxine 100 MICROGram(s) Enteral Tube <User Schedule>  lidocaine   4% Patch 1 Patch Transdermal daily  multivitamin/minerals/iron Oral Solution (CENTRUM) 15 milliLiter(s) Oral daily  oxybutynin 5 milliGRAM(s) Oral daily  pantoprazole   Suspension 40 milliGRAM(s) Enteral Tube daily  polyethylene glycol 3350 17 Gram(s) Oral two times a day  predniSONE   Tablet 5 milliGRAM(s) Oral daily  QUEtiapine 12.5 milliGRAM(s) Oral at bedtime  simethicone 80 milliGRAM(s) Chew four times a day  zinc sulfate 220 milliGRAM(s) Oral daily      PRN INPATIENT MEDICATION  acetaminophen   Oral Liquid .. 650 milliGRAM(s) Oral every 6 hours PRN  albuterol/ipratropium for Nebulization 3 milliLiter(s) Nebulizer every 6 hours PRN  diphenhydrAMINE Elixir 25 milliGRAM(s) Oral every 6 hours PRN  oxycodone    5 mG/acetaminophen 325 mG 1 Tablet(s) Oral every 6 hours PRN  sodium chloride 0.65% Nasal 2 Spray(s) Both Nostrils two times a day PRN        VITALS/PHYSICAL EXAM  --------------------------------------------------------------------------------  T(C): 37 (11-06-23 @ 14:50), Max: 38.1 (11-05-23 @ 19:13)  HR: 69 (11-06-23 @ 15:10) (62 - 78)  BP: 131/62 (11-06-23 @ 14:50) (131/62 - 153/78)  RR: 12 (11-06-23 @ 04:23) (12 - 20)  SpO2: 100% (11-06-23 @ 15:10) (99% - 100%)  Wt(kg): --        11-05-23 @ 07:01  -  11-06-23 @ 07:00  --------------------------------------------------------  IN: 520 mL / OUT: 500 mL / NET: 20 mL    11-06-23 @ 07:01  -  11-06-23 @ 16:09  --------------------------------------------------------  IN: 400 mL / OUT: 0 mL / NET: 400 mL            LABS/ CULTURES/ RADIOLOGY:              9.5    7.55  >-----------<  107      [11-06-23 @ 10:17]              30.9     137  |  102  |  20  ----------------------------<  156      [11-06-23 @ 10:17]  4.3   |  22  |  <0.30        Ca     9.2     [11-06-23 @ 10:17]      Mg     2.0     [11-06-23 @ 10:17]      Phos  4.0     [11-06-23 @ 10:17]      CAPILLARY BLOOD GLUCOSE  POCT Blood Glucose.: 161 mg/dL (06 Nov 2023 11:21)  POCT Blood Glucose.: 119 mg/dL (06 Nov 2023 05:10)  POCT Blood Glucose.: 153 mg/dL (05 Nov 2023 23:53)  POCT Blood Glucose.: 147 mg/dL (05 Nov 2023 17:20)    A1C with Estimated Average Glucose Result: 7.5 % (10-28-23 @ 07:18)   [facility]-- WOUND TEAM -- FOLLOW UP NOTE  --------------------------------------------------------------------------------    24 hour events/subjective:    afebrile  more alert  tolerating TF  tolerating dressings     no odor pain excess drainage  incontinent      Diet:  Diet, NPO with Tube Feed:   Tube Feeding Modality: Gastrostomy  Casie Black Peptide 1.5  Total Volume for 24 Hours (mL): 900  Continuous  Starting Tube Feed Rate mL per Hour: 50  Increase Tube Feed Rate by (mL): 0  Until Goal Tube Feed Rate (mL per Hour): 50  Tube Feed Duration (in Hours): 18  Tube Feed Start Time: 06:00  Tube Feed Stop Time: 23:59  Free Water Flush  Pump   Rate (mL per Hour): 10   Frequency: Every 2 Hours  Liquid Protein Supplement     Qty per Day:  1 (11-04-23 @ 17:04)      ROS: pt unable to offer    ALLERGIES & MEDICATIONS  --------------------------------------------------------------------------------  Allergies  penicillin (Unknown)  heparin (Unknown)  Tagamet (Unknown)  pineapple (Unknown)  walnut (Unknown)  Andressa Rawls Hazelnut (Unknown)  Lyrica (Unknown)        STANDING INPATIENT MEDICATIONS  artificial  tears Solution 2 Drop(s) Both EYES every 6 hours  ascorbic acid 500 milliGRAM(s) Oral daily  calcium carbonate    500 mG (Tums) Chewable 1 Tablet(s) Chew every 12 hours  cephalexin 500 milliGRAM(s) Oral every 6 hours  chlorhexidine 0.12% Liquid 15 milliLiter(s) Oral Mucosa every 12 hours  chlorhexidine 4% Liquid 1 Application(s) Topical <User Schedule>  ciprofloxacin     Tablet 500 milliGRAM(s) Oral every 12 hours  dextrose 50% Injectable 50 milliLiter(s) IV Push once  enoxaparin Injectable 40 milliGRAM(s) SubCutaneous every 24 hours  escitalopram 10 milliGRAM(s) Oral daily  fentaNYL   Patch  25 MICROgram(s)/Hr 1 Patch Transdermal every 72 hours  gabapentin Solution 100 milliGRAM(s) Enteral Tube at bedtime  insulin glargine Injectable (LANTUS) 14 Unit(s) SubCutaneous every morning  insulin lispro (ADMELOG) corrective regimen sliding scale   SubCutaneous every 6 hours  lactobacillus acidophilus 1 Tablet(s) Oral daily  levothyroxine 100 MICROGram(s) Enteral Tube <User Schedule>  lidocaine   4% Patch 1 Patch Transdermal daily  multivitamin/minerals/iron Oral Solution (CENTRUM) 15 milliLiter(s) Oral daily  oxybutynin 5 milliGRAM(s) Oral daily  pantoprazole   Suspension 40 milliGRAM(s) Enteral Tube daily  polyethylene glycol 3350 17 Gram(s) Oral two times a day  predniSONE   Tablet 5 milliGRAM(s) Oral daily  QUEtiapine 12.5 milliGRAM(s) Oral at bedtime  simethicone 80 milliGRAM(s) Chew four times a day  zinc sulfate 220 milliGRAM(s) Oral daily      PRN INPATIENT MEDICATION  acetaminophen   Oral Liquid .. 650 milliGRAM(s) Oral every 6 hours PRN  albuterol/ipratropium for Nebulization 3 milliLiter(s) Nebulizer every 6 hours PRN  diphenhydrAMINE Elixir 25 milliGRAM(s) Oral every 6 hours PRN  oxycodone    5 mG/acetaminophen 325 mG 1 Tablet(s) Oral every 6 hours PRN  sodium chloride 0.65% Nasal 2 Spray(s) Both Nostrils two times a day PRN        VITALS/PHYSICAL EXAM  --------------------------------------------------------------------------------  T(C): 37 (11-06-23 @ 14:50), Max: 38.1 (11-05-23 @ 19:13)  HR: 69 (11-06-23 @ 15:10) (62 - 78)  BP: 131/62 (11-06-23 @ 14:50) (131/62 - 153/78)  RR: 12 (11-06-23 @ 04:23) (12 - 20)  SpO2: 100% (11-06-23 @ 15:10) (99% - 100%)  Wt(kg): --      NAD,  Alert, frail,  WD/ WN/ WG  Total [employer] Sport bed  HEENT:  NC/AT, EOMI, sclera clear, mucosa moist, throat clear, trach       w/o secretions or peritrach skin irration  Gastrointestinal: soft NT/ND (+)PEG w/o drainage or skin irration  : (+)emerson condom cath  Neurology:  nonverbal, no follow commands, paraplegic  Psych: calm/ appropriate  Musculoskeletal:  pROM, no deformities/ contractures  Vascular: BLE equally warm,  no cyanosis, clubbing nor acute ischemia  Skin:  moist w/ good turgor  Sacral stage 4pressure injury  4.5cm x 2.5cm x 1.5cm w/ lip of undermining circumferentially      greatest 9-12 of 3cm   moist granular wound bed   palpable but not exposed bone  no blistering   (+) serosanguinous drainage  No odor, erythema, increased warmth, tenderness, induration, fluctuance, nor crepitus      LABS/ CULTURES/ RADIOLOGY:              9.5    7.55  >-----------<  107      [11-06-23 @ 10:17]              30.9     137  |  102  |  20  ----------------------------<  156      [11-06-23 @ 10:17]  4.3   |  22  |  <0.30        Ca     9.2     [11-06-23 @ 10:17]      Mg     2.0     [11-06-23 @ 10:17]      Phos  4.0     [11-06-23 @ 10:17]      CAPILLARY BLOOD GLUCOSE  POCT Blood Glucose.: 161 mg/dL (06 Nov 2023 11:21)  POCT Blood Glucose.: 119 mg/dL (06 Nov 2023 05:10)  POCT Blood Glucose.: 153 mg/dL (05 Nov 2023 23:53)  POCT Blood Glucose.: 147 mg/dL (05 Nov 2023 17:20)    A1C with Estimated Average Glucose Result: 7.5 % (10-28-23 @ 07:18)

## 2023-11-06 NOTE — PROGRESS NOTE ADULT - SUBJECTIVE AND OBJECTIVE BOX
Patient is a 73y old  Female who presents with a chief complaint of 72 y/o F with PMHx of T2DM, HTN, HLD, anemia, hypothyroidism, RA, fibromyalgia, remote cerebral aneurysm repair, acute hypercapnic respiratory failure now with tracheostomy, PEG tube, and bedbound (trach change , PEG change 8/14), sacral pressure ulcer, BIBEMS from home for 6 day hx of bleeding from the tracheostomy and PEG tube with associated abdominal pain, recent history of auditory and visual hallucinations, and with chronic sacral decubitus ulcer.    (29 Oct 2023 14:01)    HPI:  72 y/o F with PMHx of T2DM, HTN, HLD, anemia, hypothyroidism, RA, fibromyalgia, remote cerebral aneurysm repair, acute hypercapnic respiratory failure now with tracheostomy, PEG tube, and bedbound (trach change , PEG change 8), sacral pressure ulcer, BIBEMS from home for 6 day hx of bleeding from the tracheostomy and PEG tube with associated abdominal pain. Patient still having regular bowel movements, last one occurred prior to ED arrival. Was seen outpatient on 10/26 by critical care Dr. Zaki Gottlieb and apparently had cultures sent at that time. Patient also noted to have chronic sacral decubitous ulcer. Family endorsed one episode of fever, though was not febrile on evaluation. Daughter noted patient was recently experiencing auditory and visual hallucinations (seeing animals that were not there & hearing a "bomb" going off outside her home), though patient not actively hallucinating on evaluation.  ED course notable for CT neck imaging showing no evidence of active bleeding around the tracheostomy site, no hematomas, and no drainable collection around the tracheostomy site. CT abdomen/pelvis notable for gastrostomy tube localized to the stomach with mild thickening noted along the tract; a sacral decubitus ulcer extending to the coccyx with osseous remodeling, possibly representing osteomyelitis; and bibasilar patchy opacities with volume loss in the lungs, representing atelectasis vs infection. Patient admitted for respiratory support, wound care of tracheostomy and PEG, and management of presumed sacral osteomyelitis.   (28 Oct 2023 04:18)    Interval Events:    REVIEW OF SYSTEMS:  [ ] Positive  [ ] All other systems negative  [ ] Unable to assess ROS because ________    Vital Signs Last 24 Hrs  T(C): 37 (23 @ 04:23), Max: 38.1 (23 @ 19:13)  T(F): 98.6 (23 @ 04:23), Max: 100.6 (23 @ 19:13)  HR: 62 (23 @ 04:23) (62 - 78)  BP: 153/78 (23 @ 04:23) (133/66 - 153/78)  RR: 12 (23 @ 04:23) (12 - 20)  SpO2: 100% (23 @ 04:23) (98% - 100%)    PHYSICAL EXAM:  HEENT:   [ ]Tracheostomy:  [ ]Pupils equal  [ ]No oral lesions  [ ]Abnormal    SKIN  [ ] No Rash  [ ] Abnormal  [ ] pressure    CARDIAC  [ ]Regular  [ ]Abnormal    PULMONARY  [ ]Bilateral Clear Breath Sounds  [ ]Normal Excursion  [ ]Abnormal    GI  [ ]PEG      [ ] +BS		              [ ]Soft, nondistended, nontender	  [ ]Abnormal    MUSCULOSKELETAL                                   [ ]Bedbound                 [ ]Abnormal    [ ]Ambulatory/OOB to chair                           EXTREMITIES                                         [ ]Normal  [ ]Edema                           NEUROLOGIC  [ ] Normal, non focal  [ ] Focal findings:    PSYCHIATRIC  [ ]Alert and appropriate  [ ] Sedated	 [ ]Agitated    :  Mcbride: [ ] Yes, if yes: Date of Placement:                   [  ] No    LINES: Central Lines [ ] Yes, if yes: Date of Placement                                     [  ] No    HOSPITAL MEDICATIONS:  MEDICATIONS  (STANDING):  artificial  tears Solution 2 Drop(s) Both EYES every 6 hours  ascorbic acid 500 milliGRAM(s) Oral daily  calcium carbonate    500 mG (Tums) Chewable 1 Tablet(s) Chew every 12 hours  cephalexin 500 milliGRAM(s) Oral every 6 hours  chlorhexidine 0.12% Liquid 15 milliLiter(s) Oral Mucosa every 12 hours  chlorhexidine 4% Liquid 1 Application(s) Topical <User Schedule>  ciprofloxacin     Tablet 500 milliGRAM(s) Oral every 12 hours  dextrose 50% Injectable 50 milliLiter(s) IV Push once  enoxaparin Injectable 40 milliGRAM(s) SubCutaneous every 24 hours  escitalopram 10 milliGRAM(s) Oral daily  fentaNYL   Patch  25 MICROgram(s)/Hr 1 Patch Transdermal every 72 hours  gabapentin Solution 100 milliGRAM(s) Enteral Tube at bedtime  insulin glargine Injectable (LANTUS) 14 Unit(s) SubCutaneous every morning  insulin lispro (ADMELOG) corrective regimen sliding scale   SubCutaneous every 6 hours  lactobacillus acidophilus 1 Tablet(s) Oral daily  levothyroxine 100 MICROGram(s) Enteral Tube <User Schedule>  lidocaine   4% Patch 1 Patch Transdermal daily  multivitamin/minerals/iron Oral Solution (CENTRUM) 15 milliLiter(s) Oral daily  oxybutynin 5 milliGRAM(s) Oral daily  oxycodone    5 mG/acetaminophen 325 mG 1 Tablet(s) Oral every 6 hours  pantoprazole   Suspension 40 milliGRAM(s) Enteral Tube daily  polyethylene glycol 3350 17 Gram(s) Oral two times a day  predniSONE   Tablet 5 milliGRAM(s) Oral daily  QUEtiapine 12.5 milliGRAM(s) Oral at bedtime  simethicone 80 milliGRAM(s) Chew four times a day  zinc sulfate 220 milliGRAM(s) Oral daily    MEDICATIONS  (PRN):  acetaminophen   Oral Liquid .. 650 milliGRAM(s) Oral every 6 hours PRN Temp greater or equal to 38C (100.4F), Moderate Pain (4 - 6)  albuterol/ipratropium for Nebulization 3 milliLiter(s) Nebulizer every 6 hours PRN Bronchospasm  diphenhydrAMINE Elixir 25 milliGRAM(s) Oral every 6 hours PRN Rash and/or Itching  sodium chloride 0.65% Nasal 2 Spray(s) Both Nostrils two times a day PRN Nasal Congestion      LABS:            Urinalysis Basic - ( 2023 06:35 )    Color: Yellow / Appearance: Clear / S.028 / pH: x  Gluc: x / Ketone: Trace mg/dL  / Bili: Negative / Urobili: 0.2 mg/dL   Blood: x / Protein: 30 mg/dL / Nitrite: Negative   Leuk Esterase: Negative / RBC: 2 /HPF / WBC 0 /HPF   Sq Epi: x / Non Sq Epi: 0 /HPF / Bacteria: Few /HPF    Mode: AC/ CMV (Assist Control/ Continuous Mandatory Ventilation)  RR (machine): 12  TV (machine): 350  FiO2: 30  PEEP: 5  ITime: 1  MAP: 8  PIP: 31   Patient is a 73y old  Female who presents with a chief complaint of 72 y/o F with PMHx of T2DM, HTN, HLD, anemia, hypothyroidism, RA, fibromyalgia, remote cerebral aneurysm repair, acute hypercapnic respiratory failure now with tracheostomy, PEG tube, and bedbound (trach change , PEG change 8/14), sacral pressure ulcer, BIBEMS from home for 6 day hx of bleeding from the tracheostomy and PEG tube with associated abdominal pain, recent history of auditory and visual hallucinations, and with chronic sacral decubitus ulcer.    (29 Oct 2023 14:01)    HPI:  72 y/o F with PMHx of T2DM, HTN, HLD, anemia, hypothyroidism, RA, fibromyalgia, remote cerebral aneurysm repair, acute hypercapnic respiratory failure now with tracheostomy, PEG tube, and bedbound (trach change , PEG change 8), sacral pressure ulcer, BIBEMS from home for 6 day hx of bleeding from the tracheostomy and PEG tube with associated abdominal pain. Patient still having regular bowel movements, last one occurred prior to ED arrival. Was seen outpatient on 10/26 by critical care Dr. Zaki Gottlieb and apparently had cultures sent at that time. Patient also noted to have chronic sacral decubitus ulcer. Family endorsed one episode of fever, though was not febrile on evaluation. Daughter noted patient was recently experiencing auditory and visual hallucinations (seeing animals that were not there & hearing a "bomb" going off outside her home), though patient not actively hallucinating on evaluation.  ED course notable for CT neck imaging showing no evidence of active bleeding around the tracheostomy site, no hematomas, and no drainable collection around the tracheostomy site. CT abdomen/pelvis notable for gastrostomy tube localized to the stomach with mild thickening noted along the tract; a sacral decubitus ulcer extending to the coccyx with osseous remodeling, possibly representing osteomyelitis; and bibasilar patchy opacities with volume loss in the lungs, representing atelectasis vs infection. Patient admitted for respiratory support, wound care of tracheostomy and PEG, and management of presumed sacral osteomyelitis.   (28 Oct 2023 04:18)    Interval Events: low grade fever of 100.6    REVIEW OF SYSTEMS:  [x] Positive; complained of sacral pain  [ ] All other systems negative  [ ] Unable to assess ROS because ________    Vital Signs Last 24 Hrs  T(C): 37 (23 @ 04:23), Max: 38.1 (23 @ 19:13)  T(F): 98.6 (23 @ 04:23), Max: 100.6 (23 @ 19:13)  HR: 62 (23 @ 04:23) (62 - 78)  BP: 153/78 (23 @ 04:23) (133/66 - 153/78)  RR: 12 (23 @ 04:23) (12 - 20)  SpO2: 100% (23 @ 04:23) (98% - 100%)    PHYSICAL EXAM:  HEENT:   [ X ]Tracheostomy: #9 Portex  [ X ]Pupils equal  [ ]No oral lesions  [ ]Abnormal    SKIN  [ X ] No Rash  [ X ] Abnormal - sacral wound  [ ] pressure    CARDIAC  [ X ]Regular  [ ]Abnormal    PULMONARY  [ ] Bilateral Clear Breath Sounds  [ ]Normal Excursion  [ X ] Abnormal - R sided rhonchi, diminished BS at b/l bases    GI  [X] PEG      [ X] +BS		              [X] Soft, nondistended, nontender	  [ ]Abnormal    MUSCULOSKELETAL                                   [X] Bedbound                 [ ]Abnormal    [ ]Ambulatory/OOB to chair                           EXTREMITIES                                         [X]Normal  [ ]Edema                           NEUROLOGIC  [X] Normal, non focal  [ ] Focal findings:    PSYCHIATRIC  [X] Alert and appropriate  [ ] Sedated	 [ ]Agitated    :  Mcbride: [ ] Yes, if yes: Date of Placement:                   [X] No    LINES: Central Lines [ ] Yes, if yes: Date of Placement                                     [X] No    HOSPITAL MEDICATIONS:  MEDICATIONS  (STANDING):  artificial  tears Solution 2 Drop(s) Both EYES every 6 hours  ascorbic acid 500 milliGRAM(s) Oral daily  calcium carbonate    500 mG (Tums) Chewable 1 Tablet(s) Chew every 12 hours  cephalexin 500 milliGRAM(s) Oral every 6 hours  chlorhexidine 0.12% Liquid 15 milliLiter(s) Oral Mucosa every 12 hours  chlorhexidine 4% Liquid 1 Application(s) Topical <User Schedule>  ciprofloxacin     Tablet 500 milliGRAM(s) Oral every 12 hours  dextrose 50% Injectable 50 milliLiter(s) IV Push once  enoxaparin Injectable 40 milliGRAM(s) SubCutaneous every 24 hours  escitalopram 10 milliGRAM(s) Oral daily  fentaNYL   Patch  25 MICROgram(s)/Hr 1 Patch Transdermal every 72 hours  gabapentin Solution 100 milliGRAM(s) Enteral Tube at bedtime  insulin glargine Injectable (LANTUS) 14 Unit(s) SubCutaneous every morning  insulin lispro (ADMELOG) corrective regimen sliding scale   SubCutaneous every 6 hours  lactobacillus acidophilus 1 Tablet(s) Oral daily  levothyroxine 100 MICROGram(s) Enteral Tube <User Schedule>  lidocaine   4% Patch 1 Patch Transdermal daily  multivitamin/minerals/iron Oral Solution (CENTRUM) 15 milliLiter(s) Oral daily  oxybutynin 5 milliGRAM(s) Oral daily  oxycodone    5 mG/acetaminophen 325 mG 1 Tablet(s) Oral every 6 hours  pantoprazole   Suspension 40 milliGRAM(s) Enteral Tube daily  polyethylene glycol 3350 17 Gram(s) Oral two times a day  predniSONE   Tablet 5 milliGRAM(s) Oral daily  QUEtiapine 12.5 milliGRAM(s) Oral at bedtime  simethicone 80 milliGRAM(s) Chew four times a day  zinc sulfate 220 milliGRAM(s) Oral daily    MEDICATIONS  (PRN):  acetaminophen   Oral Liquid .. 650 milliGRAM(s) Oral every 6 hours PRN Temp greater or equal to 38C (100.4F), Moderate Pain (4 - 6)  albuterol/ipratropium for Nebulization 3 milliLiter(s) Nebulizer every 6 hours PRN Bronchospasm  diphenhydrAMINE Elixir 25 milliGRAM(s) Oral every 6 hours PRN Rash and/or Itching  sodium chloride 0.65% Nasal 2 Spray(s) Both Nostrils two times a day PRN Nasal Congestion      Urinalysis Basic - ( 2023 06:35 )    Color: Yellow / Appearance: Clear / S.028 / pH: x  Gluc: x / Ketone: Trace mg/dL  / Bili: Negative / Urobili: 0.2 mg/dL   Blood: x / Protein: 30 mg/dL / Nitrite: Negative   Leuk Esterase: Negative / RBC: 2 /HPF / WBC 0 /HPF   Sq Epi: x / Non Sq Epi: 0 /HPF / Bacteria: Few /HPF    Mode: AC/ CMV (Assist Control/ Continuous Mandatory Ventilation)  RR (machine): 12  TV (machine): 350  FiO2: 30  PEEP: 5  ITime: 1  MAP: 8  PIP: 31

## 2023-11-06 NOTE — DISCHARGE NOTE PROVIDER - HOSPITAL COURSE
74 y/o F with PMHx of T2DM, HTN, HLD, anemia, hypothyroidism, RA, fibromyalgia, remote cerebral aneurysm repair, acute hypercapnic respiratory failure now with tracheostomy, PEG tube, and bedbound (trach change 8/18, PEG change 8/14), sacral pressure ulcer, BIBEMS from home for 6 day hx of bleeding from the tracheostomy and PEG tube with associated abdominal pain, recent history of auditory and visual hallucinations, and with chronic sacral decubitus ulcer. Exam notable for mild abdominal distention with LLQ and RLQ tenderness, tracheostomy in place with no obvious bleeding, PEG tube with scant amount of dried blood, at baseline neurologic status. Imaging showing no active bleeding around tracheostomy site, however was notable for mild thickening along PEG tube tract, sacral ulcer extending to the coccyx suggestive of possible osteomyelitis, and bibasilar patchy lung opacities with volume loss. Patient admitted for respiratory support, wound care of tracheostomy and PEG, and management of presumed sacral osteomyelitis.             72 y/o F with PMHx of T2DM, HTN, HLD, anemia, hypothyroidism, RA, fibromyalgia, remote cerebral aneurysm repair, acute hypercapnic respiratory failure now with tracheostomy, PEG tube, and bedbound (trach change 8/18, PEG change 8/14), sacral pressure ulcer, BIBEMS from home for 6 day hx of bleeding from the tracheostomy and PEG tube with associated abdominal pain, recent history of auditory and visual hallucinations, and with chronic sacral decubitus ulcer. Exam notable for mild abdominal distention with LLQ and RLQ tenderness, tracheostomy in place with no obvious bleeding, PEG tube with scant amount of dried blood, at baseline neurologic status. Imaging showing no active bleeding around tracheostomy site, however was notable for mild thickening along PEG tube tract, sacral ulcer extending to the coccyx suggestive of possible osteomyelitis, and bibasilar patchy lung opacities with volume loss. Patient admitted for respiratory support, wound care of tracheostomy and PEG, and management of presumed sacral osteomyelitis.             72 y/o F with PMHx of T2DM, HTN, HLD, anemia, hypothyroidism, RA, fibromyalgia, remote cerebral aneurysm repair, acute hypercapnic respiratory failure now with tracheostomy, PEG tube, and bedbound (trach change 8/18, PEG change 8/14), sacral pressure ulcer, BIBEMS from home for 6 day hx of bleeding from the tracheostomy and PEG tube with associated abdominal pain, recent history of auditory and visual hallucinations, and with chronic sacral decubitus ulcer. Exam notable for mild abdominal distention with LLQ and RLQ tenderness, tracheostomy in place with no obvious bleeding, PEG tube with scant amount of dried blood, at baseline neurologic status. Imaging showing no active bleeding around tracheostomy site, however was notable for mild thickening along PEG tube tract, sacral ulcer extending to the coccyx suggestive of possible osteomyelitis, and bibasilar patchy lung opacities with volume loss. Patient admitted for respiratory support, wound care of tracheostomy and PEG, and management of presumed sacral osteomyelitis.  Since admission, no further bleeding noted from either trach or PEG. Imaging showed mild thickening along PEG tube tract and sacral ulcer extending to coccyx suggestive of OM.                74 y/o F with PMHx of T2DM, HTN, HLD, anemia, hypothyroidism, RA, fibromyalgia, remote cerebral aneurysm repair, acute hypercapnic respiratory failure now with tracheostomy, PEG tube, and bedbound (trach change 8/18, PEG change 8/14), sacral pressure ulcer, BIBEMS from home for 6 day hx of bleeding from the tracheostomy and PEG tube with associated abdominal pain, recent history of auditory and visual hallucinations, and with chronic sacral decubitus ulcer. Exam notable for mild abdominal distention with LLQ and RLQ tenderness, tracheostomy in place with no obvious bleeding, PEG tube with scant amount of dried blood, at baseline neurologic status. Imaging showing no active bleeding around tracheostomy site, however was notable for mild thickening along PEG tube tract, sacral ulcer extending to the coccyx suggestive of possible osteomyelitis, and bibasilar patchy lung opacities with volume loss. Patient admitted for respiratory support, wound care of tracheostomy and PEG, and management of presumed sacral osteomyelitis.  Since admission, no further bleeding noted from either trach or PEG. Imaging showed mild thickening along PEG tube tract and sacral ulcer extending to coccyx suggestive of OM.                72 y/o F with PMHx of T2DM, HTN, HLD, anemia, hypothyroidism, RA, fibromyalgia, remote cerebral aneurysm repair, acute hypercapnic respiratory failure now with tracheostomy, PEG tube, and bedbound (trach change 8/18, PEG change 8/14), sacral pressure ulcer, BIBEMS from home for 6 day hx of bleeding from the tracheostomy and PEG tube with associated abdominal pain, recent history of auditory and visual hallucinations, and with chronic sacral decubitus ulcer. Exam notable for mild abdominal distention with LLQ and RLQ tenderness, tracheostomy in place with no obvious bleeding, PEG tube with scant amount of dried blood, at baseline neurologic status. Imaging showing no active bleeding around tracheostomy site, however was notable for mild thickening along PEG tube tract, sacral ulcer extending to the coccyx suggestive of possible osteomyelitis, and bibasilar patchy lung opacities with volume loss. Patient admitted for respiratory support, wound care of tracheostomy and PEG, and management of sacral osteomyelitis. No further Trach and peg site bleeding noted since admission.  Pelvic MRI imaging also suggestive of Acute OM. Patient placed on long-term antibiotics with Infectious disease guidance.  Additionally treated with a 7d course of Cefepime due to PSA and Serratia tracheitis on 11/8-->11/15 and then resumed cipro/keflex.  Hospital course c/b Delirium for which Seroquel was added and home Lexapro continued. Tracheostomy changed to #9 Cuffed [place] by ENT 11/3.  Despite trach change patient remained with persistent air leak.  On 11/19, the trach was again changed due to leak and loss of volumes on vent.  Trach was changed to #8 distal cuffed Shiley.  Patient seen by Dermatology for back lesions.  One diagnosed as a seborrheic keratitis and the other a dermatofibroma.  Intermittent abdominal pain throughout hospital course, at times relieved with gas/BM, w/u negative, seen by GI - no recs.  Remains medically stable while pending discharge home.       74 y/o F with PMHx of T2DM, HTN, HLD, anemia, hypothyroidism, RA, fibromyalgia, remote cerebral aneurysm repair, acute hypercapnic respiratory failure now with tracheostomy, PEG tube, and bedbound (trach change 8/18, PEG change 8/14), sacral pressure ulcer, BIBEMS from home for 6 day hx of bleeding from the tracheostomy and PEG tube with associated abdominal pain, recent history of auditory and visual hallucinations, and with chronic sacral decubitus ulcer. Exam notable for mild abdominal distention with LLQ and RLQ tenderness, tracheostomy in place with no obvious bleeding, PEG tube with scant amount of dried blood, at baseline neurologic status. Imaging showing no active bleeding around tracheostomy site, however was notable for mild thickening along PEG tube tract, sacral ulcer extending to the coccyx suggestive of possible osteomyelitis, and bibasilar patchy lung opacities with volume loss. Patient admitted for respiratory support, wound care of tracheostomy and PEG, and management of sacral osteomyelitis. No further Trach and peg site bleeding noted since admission.  Pelvic MRI imaging also suggestive of Acute OM. Patient placed on long-term antibiotics with Infectious disease guidance.  Additionally treated with a 7d course of Cefepime due to PSA and Serratia tracheitis on 11/8-->11/15 and then resumed cipro/keflex.  Hospital course c/b Delirium for which Seroquel was added and home Lexapro continued. Tracheostomy changed to #9 Cuffed [place] by ENT 11/3.  Despite trach change patient remained with persistent air leak.  On 11/19, the trach was again changed due to leak and loss of volumes on vent.  Trach was changed to #8 distal cuffed Shiley.  Patient seen by Dermatology for back lesions.  One diagnosed as a seborrheic keratitis and the other a dermatofibroma.  Intermittent abdominal pain throughout hospital course, at times relieved with gas/BM, w/u negative, seen by GI - no recs.  Remains medically stable while pending discharge home.       74 y/o F with PMHx of T2DM, HTN, HLD, anemia, hypothyroidism, RA, fibromyalgia, remote cerebral aneurysm repair, acute hypercapnic respiratory failure now with tracheostomy, PEG tube, and bedbound (trach change 8/18, PEG change 8/14), sacral pressure ulcer, BIBEMS from home for 6 day hx of bleeding from the tracheostomy and PEG tube with associated abdominal pain, recent history of auditory and visual hallucinations, and with chronic sacral decubitus ulcer. Exam notable for mild abdominal distention with LLQ and RLQ tenderness, tracheostomy in place with no obvious bleeding, PEG tube with scant amount of dried blood, at baseline neurologic status. Imaging showing no active bleeding around tracheostomy site, however was notable for mild thickening along PEG tube tract, sacral ulcer extending to the coccyx suggestive of possible osteomyelitis, and bibasilar patchy lung opacities with volume loss. Patient admitted for respiratory support, wound care of tracheostomy and PEG, and management of sacral osteomyelitis. No further Trach and peg site bleeding noted since admission.  Pelvic MRI imaging also suggestive of Acute OM. Patient placed on long-term antibiotics with Infectious disease guidance.  Additionally treated with a 7d course of Cefepime due to PSA and Serratia tracheitis on 11/8-->11/15 and then resumed cipro/keflex.  Hospital course c/b Delirium for which Seroquel was added and home Lexapro continued. Tracheostomy changed to #9 Cuffed [place] by ENT 11/3.  Despite trach change patient remained with persistent air leak.  On 11/19, the trach was again changed due to leak and loss of volumes on vent.  Trach was changed to #8 distal cuffed Shiley.  Patient seen by Dermatology for back lesions.  One diagnosed as a seborrheic keratitis and the other a dermatofibroma.  Intermittent abdominal pain throughout hospital course, at times relieved with gas/BM, w/u negative, seen by GI - no recs.  Remains medically stable while pending discharge home.    Sepsis, unspecified organism.   ·  Plan: Found w/ Bilateral PNA vs Atelectasis, and Possible OM Sacrum   - S/p Chest CT (10/28):  c/w Bilateral PNA vs Atelectasis   - s/p Vanco, Aztreonam   - Blood cx: 11/12 -- neg   - Sputum cx + Pseudomonas aeruginosa, S. aureus  - Sputum Cx 11/6: + PSA and Serratia, Cefepime 11/8 -->11/15  - 11/26 sputum culture MDR pseudomonas - clinically stable, defer treatment unless decompensates as per ID  - 11/26 urine culture - enterococcus - no need to treat - cfu low, organism not significant as per ID  - 12/19 respiked fever - blood cultures NGTD - received dose of vanco and cefepime - d/c'd, holding antibiotics until further results.    Problem/Plan - 2:  ·  Problem: Sacral osteomyelitis.   ·  Plan: 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   - S/p CT abd/pelvis (10/28): Sacral decubitus ulcer extending to the coccyx with osseous remodeling and pelvic MRI or bone scan to recc to exclude osteomyelitis.  - 10/30 wound care note: Sacral stage 4 pressure injury 4.5cm x 2.5cm x 1.5cm w/ lip of undermining circumferentially greatest 9-12 of 3cm.  - MRI pelvis 10/30 with Osteomyelitis, completed Cefepime for 7 days, Vancomycin d/marli   - 11/10: resumed Cipro 500mg q 12 and Keflex 500mg q 6 until 12/10.  - 11/20: Changed to IV Cipro 400 q12 as recommended by ID pharmacist due to multiple drug interactions when given via PEG  - 11/28 wound care note: Sacral stage 4pressure injury 4.5cm x 2.5cm x 0.5cm, moist granular wound bed   palpable but not exposed bone no blistering, (+) serosanguinous drainage. No odor, erythema, increased warmth, tenderness, induration, fluctuance, nor crepitus.  - 12/3 wound culture ordered - d/c'd as no need for culture, wound improving, no signs of infection  - 12/10 completed cipro and keflex x5 week course  - wound care following.    Problem/Plan - 3:  ·  Problem: Need for dental care.   ·  Plan: Seen by Dental Service on 11/27 and 12/15  - initially seen to rule out infectious source - no signs on infection seen on exam  - 12/15 reconsulted for extraction - no inpatient extraction, f/u outpt  - 12/18 reached out to dental for dental attending to discuss case with pts daughter, Marysol  - 12/20 updated daughter Marysol, she is aware of discussion and decision between dental resident and attending - that there will be no extraction inpatient and she is likely not going to receive a phone call from dental attending (Dr. Lee).    Problem/Plan - 4:  ·  Problem: Acute respiratory failure with hypoxia.   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  - Tolerates intermittent PSV 15/5 during day/Vent HS  - Airway Clearance: Duoneb, Hypersal, Chest PT, PRN suctioning   - Maintain 02 sat > 92%    Tracheal Bleeding (Intermittent)-->resolved since admission   - S/p CT Angio neck: No evidence of active bleeding around tracheostomy site. No hematoma.  No collection   - Seen by ENT; Kath and b/l bronchi visualized without any trauma, small amount of blood tinged secretions resting at beginning of right bronchus not actively bleeding.  - 12/13 tracheal bleeding upon suctioning - tranexamic neb given  - 12/18 tranexamic neb x2 doses - improved.    Problem/Plan - 5:  ·  Problem: Abdominal pain.   ·  Plan: 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.    Problem/Plan - 6:  ·  Problem: HTN (hypertension).   ·  Plan: - Home amlodipine held on admission, restarted -- continue monitoring BP   - Echo from 10/17/2020 notable for hyperdynamic L ventricular systolic function, moderately severe LVOT obstruction, and EF 81% (per Wilkinson calculation) vs 77% (per Teichholz calculation).    Problem/Plan - 7:  ·  Problem: Rheumatoid arthritis.   ·  Plan: - Continue home Prednisone regimen 5 mg daily   *fibromyalgia  - continue home Gabapentin 100mg daily  - continue home Fentanyl 25mcg Q72H patches  - c/w Lidocaine patch   - Oxycodone 2.5mg q6 hrs PRN (in addition to Tylenol PRN).    Problem/Plan - 8:  ·  Problem: Type 2 diabetes mellitus.   ·  Plan: - s/p Home Levemir 14 units in morning held on admission as noted w/ hypoglycemia   - Enteral feed changed to [employer] diabetic formula; glucose numbers stabilizing  - adjustments made throughout admission per endocrine recs.   72 y/o F with PMHx of T2DM, HTN, HLD, anemia, hypothyroidism, RA, fibromyalgia, remote cerebral aneurysm repair, acute hypercapnic respiratory failure now with tracheostomy, PEG tube, and bedbound (trach change 8/18, PEG change 8/14), sacral pressure ulcer, BIBEMS from home for 6 day hx of bleeding from the tracheostomy and PEG tube with associated abdominal pain, recent history of auditory and visual hallucinations, and with chronic sacral decubitus ulcer. Exam notable for mild abdominal distention with LLQ and RLQ tenderness, tracheostomy in place with no obvious bleeding, PEG tube with scant amount of dried blood, at baseline neurologic status. Imaging showing no active bleeding around tracheostomy site, however was notable for mild thickening along PEG tube tract, sacral ulcer extending to the coccyx suggestive of possible osteomyelitis, and bibasilar patchy lung opacities with volume loss. Patient admitted for respiratory support, wound care of tracheostomy and PEG, and management of sacral osteomyelitis. No further Trach and peg site bleeding noted since admission.  Pelvic MRI imaging also suggestive of Acute OM. Patient placed on long-term antibiotics with Infectious disease guidance.  Additionally treated with a 7d course of Cefepime due to PSA and Serratia tracheitis on 11/8-->11/15 and then resumed cipro/keflex.  Hospital course c/b Delirium for which Seroquel was added and home Lexapro continued. Tracheostomy changed to #9 Cuffed [place] by ENT 11/3.  Despite trach change patient remained with persistent air leak.  On 11/19, the trach was again changed due to leak and loss of volumes on vent.  Trach was changed to #8 distal cuffed Shiley.  Patient seen by Dermatology for back lesions.  One diagnosed as a seborrheic keratitis and the other a dermatofibroma.  Intermittent abdominal pain throughout hospital course, at times relieved with gas/BM, w/u negative, seen by GI - no recs.  Remains medically stable while pending discharge home.    Sepsis, unspecified organism.   ·  Plan: Found w/ Bilateral PNA vs Atelectasis, and Possible OM Sacrum   - S/p Chest CT (10/28):  c/w Bilateral PNA vs Atelectasis   - s/p Vanco, Aztreonam   - Blood cx: 11/12 -- neg   - Sputum cx + Pseudomonas aeruginosa, S. aureus  - Sputum Cx 11/6: + PSA and Serratia, Cefepime 11/8 -->11/15  - 11/26 sputum culture MDR pseudomonas - clinically stable, defer treatment unless decompensates as per ID  - 11/26 urine culture - enterococcus - no need to treat - cfu low, organism not significant as per ID  - 12/19 respiked fever - blood cultures NGTD - received dose of vanco and cefepime - d/c'd, holding antibiotics until further results.    Problem/Plan - 2:  ·  Problem: Sacral osteomyelitis.   ·  Plan: 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   - S/p CT abd/pelvis (10/28): Sacral decubitus ulcer extending to the coccyx with osseous remodeling and pelvic MRI or bone scan to recc to exclude osteomyelitis.  - 10/30 wound care note: Sacral stage 4 pressure injury 4.5cm x 2.5cm x 1.5cm w/ lip of undermining circumferentially greatest 9-12 of 3cm.  - MRI pelvis 10/30 with Osteomyelitis, completed Cefepime for 7 days, Vancomycin d/marli   - 11/10: resumed Cipro 500mg q 12 and Keflex 500mg q 6 until 12/10.  - 11/20: Changed to IV Cipro 400 q12 as recommended by ID pharmacist due to multiple drug interactions when given via PEG  - 11/28 wound care note: Sacral stage 4pressure injury 4.5cm x 2.5cm x 0.5cm, moist granular wound bed   palpable but not exposed bone no blistering, (+) serosanguinous drainage. No odor, erythema, increased warmth, tenderness, induration, fluctuance, nor crepitus.  - 12/3 wound culture ordered - d/c'd as no need for culture, wound improving, no signs of infection  - 12/10 completed cipro and keflex x5 week course  - wound care following.    Problem/Plan - 3:  ·  Problem: Need for dental care.   ·  Plan: Seen by Dental Service on 11/27 and 12/15  - initially seen to rule out infectious source - no signs on infection seen on exam  - 12/15 reconsulted for extraction - no inpatient extraction, f/u outpt  - 12/18 reached out to dental for dental attending to discuss case with pts daughter, Marysol  - 12/20 updated daughter Marysol, she is aware of discussion and decision between dental resident and attending - that there will be no extraction inpatient and she is likely not going to receive a phone call from dental attending (Dr. Lee).    Problem/Plan - 4:  ·  Problem: Acute respiratory failure with hypoxia.   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  - Tolerates intermittent PSV 15/5 during day/Vent HS  - Airway Clearance: Duoneb, Hypersal, Chest PT, PRN suctioning   - Maintain 02 sat > 92%    Tracheal Bleeding (Intermittent)-->resolved since admission   - S/p CT Angio neck: No evidence of active bleeding around tracheostomy site. No hematoma.  No collection   - Seen by ENT; Kath and b/l bronchi visualized without any trauma, small amount of blood tinged secretions resting at beginning of right bronchus not actively bleeding.  - 12/13 tracheal bleeding upon suctioning - tranexamic neb given  - 12/18 tranexamic neb x2 doses - improved.    Problem/Plan - 5:  ·  Problem: Abdominal pain.   ·  Plan: 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.    Problem/Plan - 6:  ·  Problem: HTN (hypertension).   ·  Plan: - Home amlodipine held on admission, restarted -- continue monitoring BP   - Echo from 10/17/2020 notable for hyperdynamic L ventricular systolic function, moderately severe LVOT obstruction, and EF 81% (per Wilkinson calculation) vs 77% (per Teichholz calculation).    Problem/Plan - 7:  ·  Problem: Rheumatoid arthritis.   ·  Plan: - Continue home Prednisone regimen 5 mg daily   *fibromyalgia  - continue home Gabapentin 100mg daily  - continue home Fentanyl 25mcg Q72H patches  - c/w Lidocaine patch   - Oxycodone 2.5mg q6 hrs PRN (in addition to Tylenol PRN).    Problem/Plan - 8:  ·  Problem: Type 2 diabetes mellitus.   ·  Plan: - s/p Home Levemir 14 units in morning held on admission as noted w/ hypoglycemia   - Enteral feed changed to [employer] diabetic formula; glucose numbers stabilizing  - adjustments made throughout admission per endocrine recs.   74 y/o F with PMHx of T2DM, HTN, HLD, anemia, hypothyroidism, RA, fibromyalgia, remote cerebral aneurysm repair, acute hypercapnic respiratory failure now with tracheostomy, PEG tube, and bedbound (trach change 8/18, PEG change 8/14), sacral pressure ulcer, BIBEMS from home for 6 day hx of bleeding from the tracheostomy and PEG tube with associated abdominal pain, recent history of auditory and visual hallucinations, and with chronic sacral decubitus ulcer. Exam notable for mild abdominal distention with LLQ and RLQ tenderness, tracheostomy in place with no obvious bleeding, PEG tube with scant amount of dried blood, at baseline neurologic status. Imaging showing no active bleeding around tracheostomy site, however was notable for mild thickening along PEG tube tract, sacral ulcer extending to the coccyx suggestive of possible osteomyelitis, and bibasilar patchy lung opacities with volume loss. Patient admitted for respiratory support, wound care of tracheostomy and PEG, and management of sacral osteomyelitis. No further Trach and peg site bleeding noted since admission.  Pelvic MRI imaging also suggestive of Acute OM. Patient placed on long-term antibiotics with Infectious disease guidance.  Additionally treated with a 7d course of Cefepime due to PSA and Serratia tracheitis on 11/8-->11/15 and then resumed cipro/keflex.  Hospital course c/b Delirium for which Seroquel was added and home Lexapro continued. Tracheostomy changed to #9 Cuffed Portex by ENT 11/3.  Despite trach change patient remained with persistent air leak.  On 11/19, the trach was again changed due to leak and loss of volumes on vent.  Trach was changed to #8 distal cuffed Shiley.  Patient seen by Dermatology for back lesions.  One diagnosed as a seborrheic keratitis and the other a dermatofibroma.  Intermittent abdominal pain throughout hospital course, at times relieved with gas/BM, w/u negative, seen by GI - no recs.  Remains medically stable while pending discharge home.    Sepsis, unspecified organism.   ·  Plan: Found w/ Bilateral PNA vs Atelectasis, and Possible OM Sacrum   - S/p Chest CT (10/28):  c/w Bilateral PNA vs Atelectasis   - s/p Vanco, Aztreonam   - Blood cx: 11/12 -- neg   - Sputum cx + Pseudomonas aeruginosa, S. aureus  - Sputum Cx 11/6: + PSA and Serratia, Cefepime 11/8 -->11/15  - 11/26 sputum culture MDR pseudomonas - clinically stable, defer treatment unless decompensates as per ID  - 11/26 urine culture - enterococcus - no need to treat - cfu low, organism not significant as per ID  - 12/19 respiked fever - blood cultures NGTD - received dose of vanco and cefepime - d/c'd, holding antibiotics until further results.    Problem/Plan - 2:  ·  Problem: Sacral osteomyelitis.   ·  Plan: 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   - S/p CT abd/pelvis (10/28): Sacral decubitus ulcer extending to the coccyx with osseous remodeling and pelvic MRI or bone scan to recc to exclude osteomyelitis.  - 10/30 wound care note: Sacral stage 4 pressure injury 4.5cm x 2.5cm x 1.5cm w/ lip of undermining circumferentially greatest 9-12 of 3cm.  - MRI pelvis 10/30 with Osteomyelitis, completed Cefepime for 7 days, Vancomycin d/marli   - 11/10: resumed Cipro 500mg q 12 and Keflex 500mg q 6 until 12/10.  - 11/20: Changed to IV Cipro 400 q12 as recommended by ID pharmacist due to multiple drug interactions when given via PEG  - 11/28 wound care note: Sacral stage 4pressure injury 4.5cm x 2.5cm x 0.5cm, moist granular wound bed   palpable but not exposed bone no blistering, (+) serosanguinous drainage. No odor, erythema, increased warmth, tenderness, induration, fluctuance, nor crepitus.  - 12/3 wound culture ordered - d/c'd as no need for culture, wound improving, no signs of infection  - 12/10 completed cipro and keflex x5 week course  - wound care following.    Problem/Plan - 3:  ·  Problem: Need for dental care.   ·  Plan: Seen by Dental Service on 11/27 and 12/15  - initially seen to rule out infectious source - no signs on infection seen on exam  - 12/15 reconsulted for extraction - no inpatient extraction, f/u outpt  - 12/18 reached out to dental for dental attending to discuss case with pts daughter, Marysol  - 12/20 updated daughter Marysol, she is aware of discussion and decision between dental resident and attending - that there will be no extraction inpatient and she is likely not going to receive a phone call from dental attending (Dr. Lee).    Problem/Plan - 4:  ·  Problem: Acute respiratory failure with hypoxia.   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  - Tolerates intermittent PSV 15/5 during day/Vent HS  - Airway Clearance: Duoneb, Hypersal, Chest PT, PRN suctioning   - Maintain 02 sat > 92%    Tracheal Bleeding (Intermittent)-->resolved since admission   - S/p CT Angio neck: No evidence of active bleeding around tracheostomy site. No hematoma.  No collection   - Seen by ENT; Kath and b/l bronchi visualized without any trauma, small amount of blood tinged secretions resting at beginning of right bronchus not actively bleeding.  - 12/13 tracheal bleeding upon suctioning - tranexamic neb given  - 12/18 tranexamic neb x2 doses - improved.    Problem/Plan - 5:  ·  Problem: Abdominal pain.   ·  Plan: 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.    Problem/Plan - 6:  ·  Problem: HTN (hypertension).   ·  Plan: - Home amlodipine held on admission, restarted -- continue monitoring BP   - Echo from 10/17/2020 notable for hyperdynamic L ventricular systolic function, moderately severe LVOT obstruction, and EF 81% (per Wilkinson calculation) vs 77% (per Teichholz calculation).    Problem/Plan - 7:  ·  Problem: Rheumatoid arthritis.   ·  Plan: - Continue home Prednisone regimen 5 mg daily   *fibromyalgia  - continue home Gabapentin 100mg daily  - continue home Fentanyl 25mcg Q72H patches  - c/w Lidocaine patch   - Oxycodone 2.5mg q6 hrs PRN (in addition to Tylenol PRN).    Problem/Plan - 8:  ·  Problem: Type 2 diabetes mellitus.   ·  Plan: - s/p Home Levemir 14 units in morning held on admission as noted w/ hypoglycemia   - Enteral feed changed to Infinite Z diabetic formula; glucose numbers stabilizing  - adjustments made throughout admission per endocrine recs.   72 y/o F with PMHx of T2DM, HTN, HLD, anemia, hypothyroidism, RA, fibromyalgia, remote cerebral aneurysm repair, acute hypercapnic respiratory failure now with tracheostomy, PEG tube, and bedbound (trach change 8/18, PEG change 8/14), sacral pressure ulcer, BIBEMS from home for 6 day hx of bleeding from the tracheostomy and PEG tube with associated abdominal pain, recent history of auditory and visual hallucinations, and with chronic sacral decubitus ulcer. Exam notable for mild abdominal distention with LLQ and RLQ tenderness, tracheostomy in place with no obvious bleeding, PEG tube with scant amount of dried blood, at baseline neurologic status. Imaging showing no active bleeding around tracheostomy site, however was notable for mild thickening along PEG tube tract, sacral ulcer extending to the coccyx suggestive of possible osteomyelitis, and bibasilar patchy lung opacities with volume loss. Patient admitted for respiratory support, wound care of tracheostomy and PEG, and management of sacral osteomyelitis. No further Trach and peg site bleeding noted since admission.  Pelvic MRI imaging also suggestive of Acute OM. Patient placed on long-term antibiotics with Infectious disease guidance.  Additionally treated with a 7d course of Cefepime due to PSA and Serratia tracheitis on 11/8-->11/15 and then resumed cipro/keflex.  Hospital course c/b Delirium for which Seroquel was added and home Lexapro continued. Tracheostomy changed to #9 Cuffed Portex by ENT 11/3.  Despite trach change patient remained with persistent air leak.  On 11/19, the trach was again changed due to leak and loss of volumes on vent.  Trach was changed to #8 distal cuffed Shiley.  Patient seen by Dermatology for back lesions.  One diagnosed as a seborrheic keratitis and the other a dermatofibroma.  Intermittent abdominal pain throughout hospital course, at times relieved with gas/BM, w/u negative, seen by GI - no recs.  Remains medically stable while pending discharge home.    Sepsis, unspecified organism.   ·  Plan: Found w/ Bilateral PNA vs Atelectasis, and Possible OM Sacrum   - S/p Chest CT (10/28):  c/w Bilateral PNA vs Atelectasis   - s/p Vanco, Aztreonam   - Blood cx: 11/12 -- neg   - Sputum cx + Pseudomonas aeruginosa, S. aureus  - Sputum Cx 11/6: + PSA and Serratia, Cefepime 11/8 -->11/15  - 11/26 sputum culture MDR pseudomonas - clinically stable, defer treatment unless decompensates as per ID  - 11/26 urine culture - enterococcus - no need to treat - cfu low, organism not significant as per ID  - 12/19 respiked fever - blood cultures NGTD - received dose of vanco and cefepime - d/c'd, holding antibiotics until further results.    Problem/Plan - 2:  ·  Problem: Sacral osteomyelitis.   ·  Plan: 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   - S/p CT abd/pelvis (10/28): Sacral decubitus ulcer extending to the coccyx with osseous remodeling and pelvic MRI or bone scan to recc to exclude osteomyelitis.  - 10/30 wound care note: Sacral stage 4 pressure injury 4.5cm x 2.5cm x 1.5cm w/ lip of undermining circumferentially greatest 9-12 of 3cm.  - MRI pelvis 10/30 with Osteomyelitis, completed Cefepime for 7 days, Vancomycin d/marli   - 11/10: resumed Cipro 500mg q 12 and Keflex 500mg q 6 until 12/10.  - 11/20: Changed to IV Cipro 400 q12 as recommended by ID pharmacist due to multiple drug interactions when given via PEG  - 11/28 wound care note: Sacral stage 4pressure injury 4.5cm x 2.5cm x 0.5cm, moist granular wound bed   palpable but not exposed bone no blistering, (+) serosanguinous drainage. No odor, erythema, increased warmth, tenderness, induration, fluctuance, nor crepitus.  - 12/3 wound culture ordered - d/c'd as no need for culture, wound improving, no signs of infection  - 12/10 completed cipro and keflex x5 week course  - wound care following.    Problem/Plan - 3:  ·  Problem: Need for dental care.   ·  Plan: Seen by Dental Service on 11/27 and 12/15  - initially seen to rule out infectious source - no signs on infection seen on exam  - 12/15 reconsulted for extraction - no inpatient extraction, f/u outpt  - 12/18 reached out to dental for dental attending to discuss case with pts daughter, Marysol  - 12/20 updated daughter Marysol, she is aware of discussion and decision between dental resident and attending - that there will be no extraction inpatient and she is likely not going to receive a phone call from dental attending (Dr. Lee).    Problem/Plan - 4:  ·  Problem: Acute respiratory failure with hypoxia.   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  - Tolerates intermittent PSV 15/5 during day/Vent HS  - Airway Clearance: Duoneb, Hypersal, Chest PT, PRN suctioning   - Maintain 02 sat > 92%    Tracheal Bleeding (Intermittent)-->resolved since admission   - S/p CT Angio neck: No evidence of active bleeding around tracheostomy site. No hematoma.  No collection   - Seen by ENT; Kath and b/l bronchi visualized without any trauma, small amount of blood tinged secretions resting at beginning of right bronchus not actively bleeding.  - 12/13 tracheal bleeding upon suctioning - tranexamic neb given  - 12/18 tranexamic neb x2 doses - improved.    Problem/Plan - 5:  ·  Problem: Abdominal pain.   ·  Plan: 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.    Problem/Plan - 6:  ·  Problem: HTN (hypertension).   ·  Plan: - Home amlodipine held on admission, restarted -- continue monitoring BP   - Echo from 10/17/2020 notable for hyperdynamic L ventricular systolic function, moderately severe LVOT obstruction, and EF 81% (per Wilkinson calculation) vs 77% (per Teichholz calculation).    Problem/Plan - 7:  ·  Problem: Rheumatoid arthritis.   ·  Plan: - Continue home Prednisone regimen 5 mg daily   *fibromyalgia  - continue home Gabapentin 100mg daily  - continue home Fentanyl 25mcg Q72H patches  - c/w Lidocaine patch   - Oxycodone 2.5mg q6 hrs PRN (in addition to Tylenol PRN).    Problem/Plan - 8:  ·  Problem: Type 2 diabetes mellitus.   ·  Plan: - s/p Home Levemir 14 units in morning held on admission as noted w/ hypoglycemia   - Enteral feed changed to Instant Information diabetic formula; glucose numbers stabilizing  - adjustments made throughout admission per endocrine recs.   72 y/o F with PMHx of T2DM, HTN, HLD, anemia, hypothyroidism, RA, fibromyalgia, remote cerebral aneurysm repair, acute hypercapnic respiratory failure now with tracheostomy, PEG tube, and bedbound (trach change 8/18, PEG change 8/14), sacral pressure ulcer, BIBEMS from home for 6 day hx of bleeding from the tracheostomy and PEG tube with associated abdominal pain, recent history of auditory and visual hallucinations, and with chronic sacral decubitus ulcer. Exam notable for mild abdominal distention with LLQ and RLQ tenderness, tracheostomy in place with no obvious bleeding, PEG tube with scant amount of dried blood, at baseline neurologic status. Imaging showing no active bleeding around tracheostomy site, however was notable for mild thickening along PEG tube tract, sacral ulcer extending to the coccyx suggestive of possible osteomyelitis, and bibasilar patchy lung opacities with volume loss. Patient admitted for respiratory support, wound care of tracheostomy and PEG, and management of sacral osteomyelitis. No further Trach and peg site bleeding noted since admission.  Pelvic MRI imaging also suggestive of Acute OM. Patient placed on long-term antibiotics with Infectious disease guidance.  Additionally treated with a 7d course of Cefepime due to PSA and Serratia tracheitis on 11/8-->11/15 and then resumed cipro/keflex.  Hospital course c/b Delirium for which Seroquel was added and home Lexapro continued. Tracheostomy changed to #9 Cuffed Portex by ENT 11/3.  Despite trach change patient remained with persistent air leak.  On 11/19, the trach was again changed due to leak and loss of volumes on vent.  Trach was changed to #8 distal cuffed Shiley.  Patient seen by Dermatology for back lesions.  One diagnosed as a seborrheic keratitis and the other a dermatofibroma.  Intermittent abdominal pain throughout hospital course, at times relieved with gas/BM, w/u negative, seen by GI - no recs.  Remains medically stable while pending discharge home.    Sepsis, unspecified organism.   ·  Plan: Found w/ Bilateral PNA vs Atelectasis, and Possible OM Sacrum   - S/p Chest CT (10/28):  c/w Bilateral PNA vs Atelectasis   - s/p Vanco, Aztreonam   - Blood cx: 11/12 -- neg   - Sputum cx + Pseudomonas aeruginosa, S. aureus  - Sputum Cx 11/6: + PSA and Serratia, Cefepime 11/8 -->11/15  - 11/26 sputum culture MDR pseudomonas - clinically stable, defer treatment unless decompensates as per ID  - 11/26 urine culture - enterococcus - no need to treat - cfu low, organism not significant as per ID  - 12/19 respiked fever - blood cultures NGTD - received dose of vanco and cefepime - d/c'd, holding antibiotics until further results.    Problem/Plan - 2:  ·  Problem: Sacral osteomyelitis.   ·  Plan: 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   - S/p CT abd/pelvis (10/28): Sacral decubitus ulcer extending to the coccyx with osseous remodeling and pelvic MRI or bone scan to recc to exclude osteomyelitis.  - 10/30 wound care note: Sacral stage 4 pressure injury 4.5cm x 2.5cm x 1.5cm w/ lip of undermining circumferentially greatest 9-12 of 3cm.  - MRI pelvis 10/30 with Osteomyelitis, completed Cefepime for 7 days, Vancomycin d/marli   - 11/10: resumed Cipro 500mg q 12 and Keflex 500mg q 6 until 12/10.  - 11/20: Changed to IV Cipro 400 q12 as recommended by ID pharmacist due to multiple drug interactions when given via PEG  - 11/28 wound care note: Sacral stage 4pressure injury 4.5cm x 2.5cm x 0.5cm, moist granular wound bed   palpable but not exposed bone no blistering, (+) serosanguinous drainage. No odor, erythema, increased warmth, tenderness, induration, fluctuance, nor crepitus.  - 12/3 wound culture ordered - d/c'd as no need for culture, wound improving, no signs of infection  - 12/10 completed cipro and keflex x5 week course  - wound care following.    Problem/Plan - 3:  ·  Problem: Need for dental care.   ·  Plan: Seen by Dental Service on 11/27 and 12/15  - initially seen to rule out infectious source - no signs on infection seen on exam  - 12/15 reconsulted for extraction - no inpatient extraction, f/u outpt  - 12/18 reached out to dental for dental attending to discuss case with pts daughter, Marysol  - 12/20 updated daughter Marysol, she is aware of discussion and decision between dental resident and attending - that there will be no extraction inpatient and she is likely not going to receive a phone call from dental attending (Dr. Lee).    Problem/Plan - 4:  ·  Problem: Acute respiratory failure with hypoxia.   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  - Tolerates intermittent PSV 15/5 during day/Vent HS  - Airway Clearance: Duoneb, Hypersal, Chest PT, PRN suctioning   - Maintain 02 sat > 92%    Tracheal Bleeding (Intermittent)-->resolved since admission   - S/p CT Angio neck: No evidence of active bleeding around tracheostomy site. No hematoma.  No collection   - Seen by ENT; Kath and b/l bronchi visualized without any trauma, small amount of blood tinged secretions resting at beginning of right bronchus not actively bleeding.  - 12/13 tracheal bleeding upon suctioning - tranexamic neb given  - 12/18 tranexamic neb x2 doses - improved.    Problem/Plan - 5:  ·  Problem: Abdominal pain.   ·  Plan: 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.    Problem/Plan - 6:  ·  Problem: HTN (hypertension).   ·  Plan: - Home amlodipine held on admission, restarted -- continue monitoring BP   - Echo from 10/17/2020 notable for hyperdynamic L ventricular systolic function, moderately severe LVOT obstruction, and EF 81% (per Wilkinson calculation) vs 77% (per Teichholz calculation).    Problem/Plan - 7:  ·  Problem: Rheumatoid arthritis.   ·  Plan: - Continue home Prednisone regimen 5 mg daily   *fibromyalgia  - continue home Gabapentin 100mg daily  - continue home Fentanyl 25mcg Q72H patches  - c/w Lidocaine patch   - Oxycodone 2.5mg q6 hrs PRN (in addition to Tylenol PRN).    Problem/Plan - 8:  ·  Problem: Type 2 diabetes mellitus.   ·  Plan: - s/p Home Levemir 14 units in morning held on admission as noted w/ hypoglycemia   - Enteral feed changed to Drip In diabetic formula; glucose numbers stabilizing  - adjustments made throughout admission per endocrine recs.   74 y/o F with PMHx of T2DM, HTN, HLD, anemia, hypothyroidism, RA, fibromyalgia, remote cerebral aneurysm repair, acute hypercapnic respiratory failure now with tracheostomy, PEG tube, and bedbound (trach change 8/18, PEG change 8/14), sacral pressure ulcer, BIBEMS from home for 6 day hx of bleeding from the tracheostomy and PEG tube with associated abdominal pain, recent history of auditory and visual hallucinations, and with chronic sacral decubitus ulcer. Exam notable for mild abdominal distention with LLQ and RLQ tenderness, tracheostomy in place with no obvious bleeding, PEG tube with scant amount of dried blood, at baseline neurologic status. Imaging showing no active bleeding around tracheostomy site, however was notable for mild thickening along PEG tube tract, sacral ulcer extending to the coccyx suggestive of possible osteomyelitis, and bibasilar patchy lung opacities with volume loss. Patient admitted for respiratory support, wound care of tracheostomy and PEG, and management of sacral osteomyelitis. No further Trach and peg site bleeding noted since admission.  Pelvic MRI imaging also suggestive of Acute OM. Patient placed on long-term antibiotics with Infectious disease guidance.  Additionally treated with a 7d course of Cefepime due to PSA and Serratia tracheitis on 11/8-->11/15 and then resumed cipro/keflex.  Hospital course c/b Delirium for which Seroquel was added and home Lexapro continued. Tracheostomy changed to #9 Cuffed Portex by ENT 11/3.  Despite trach change patient remained with persistent air leak.  On 11/19, the trach was again changed due to leak and loss of volumes on vent.  Trach was changed to #8 distal cuffed Shiley.  Patient seen by Dermatology for back lesions.  One diagnosed as a seborrheic keratitis and the other a dermatofibroma.  Intermittent abdominal pain throughout hospital course, at times relieved with gas/BM, w/u negative, seen by GI - no recs.  12/10 pt completed cipro and keflex for osteo treatment.  12/13 moderate amounts of secretions w/ blood - tranexamic acid neb given with notable improvement.  12/18 with blood tinged secretion again - TXA 250mg neb x2 doses.  12/19 spiked fever to 102 - pancultured, negative w/u.  12/19 silver Nitrate applied to PEG site by GI for leakage.  Pt has since remained medically stable.  Receives continuous pulmonary toileting w/ duoneb, chest PT, and suctioning PRN.     72 y/o F with PMHx of T2DM, HTN, HLD, anemia, hypothyroidism, RA, fibromyalgia, remote cerebral aneurysm repair, acute hypercapnic respiratory failure now with tracheostomy, PEG tube, and bedbound (trach change 8/18, PEG change 8/14), sacral pressure ulcer, BIBEMS from home for 6 day hx of bleeding from the tracheostomy and PEG tube with associated abdominal pain, recent history of auditory and visual hallucinations, and with chronic sacral decubitus ulcer. Exam notable for mild abdominal distention with LLQ and RLQ tenderness, tracheostomy in place with no obvious bleeding, PEG tube with scant amount of dried blood, at baseline neurologic status. Imaging showing no active bleeding around tracheostomy site, however was notable for mild thickening along PEG tube tract, sacral ulcer extending to the coccyx suggestive of possible osteomyelitis, and bibasilar patchy lung opacities with volume loss. Patient admitted for respiratory support, wound care of tracheostomy and PEG, and management of sacral osteomyelitis. No further Trach and peg site bleeding noted since admission.  Pelvic MRI imaging also suggestive of Acute OM. Patient placed on long-term antibiotics with Infectious disease guidance.  Additionally treated with a 7d course of Cefepime due to PSA and Serratia tracheitis on 11/8-->11/15 and then resumed cipro/keflex.  Hospital course c/b Delirium for which Seroquel was added and home Lexapro continued. Tracheostomy changed to #9 Cuffed [place] by ENT 11/3.  Despite trach change patient remained with persistent air leak.  On 11/19, the trach was again changed due to leak and loss of volumes on vent.  Trach was changed to #8 distal cuffed Shiley.  Patient seen by Dermatology for back lesions.  One diagnosed as a seborrheic keratitis and the other a dermatofibroma.  Intermittent abdominal pain throughout hospital course, at times relieved with gas/BM, w/u negative, seen by GI - no recs.  12/10 pt completed cipro and keflex for osteo treatment.  12/13 moderate amounts of secretions w/ blood - tranexamic acid neb given with notable improvement.  12/18 with blood tinged secretion again - TXA 250mg neb x2 doses.  12/19 spiked fever to 102 - pancultured, negative w/u.  12/19 silver Nitrate applied to PEG site by GI for leakage.  Pt has since remained medically stable.  Receives continuous pulmonary toileting w/ duoneb, chest PT, and suctioning PRN.     74 y/o F with PMHx of T2DM, HTN, HLD, anemia, hypothyroidism, RA, fibromyalgia, remote cerebral aneurysm repair, acute hypercapnic respiratory failure now with tracheostomy, PEG tube, and bedbound (trach change 8/18, PEG change 8/14), sacral pressure ulcer, BIBEMS from home for 6 day hx of bleeding from the tracheostomy and PEG tube with associated abdominal pain, recent history of auditory and visual hallucinations, and with chronic sacral decubitus ulcer. Exam notable for mild abdominal distention with LLQ and RLQ tenderness, tracheostomy in place with no obvious bleeding, PEG tube with scant amount of dried blood, at baseline neurologic status. Imaging showing no active bleeding around tracheostomy site, however was notable for mild thickening along PEG tube tract, sacral ulcer extending to the coccyx suggestive of possible osteomyelitis, and bibasilar patchy lung opacities with volume loss. Patient admitted for respiratory support, wound care of tracheostomy and PEG, and management of sacral osteomyelitis. No further Trach and peg site bleeding noted since admission.  Pelvic MRI imaging also suggestive of Acute OM. Patient placed on long-term antibiotics with Infectious disease guidance.  Additionally treated with a 7d course of Cefepime due to PSA and Serratia tracheitis on 11/8-->11/15 and then resumed cipro/keflex.  Hospital course c/b Delirium for which Seroquel was added and home Lexapro continued. Tracheostomy changed to #9 Cuffed [place] by ENT 11/3.  Despite trach change patient remained with persistent air leak.  On 11/19, the trach was again changed due to leak and loss of volumes on vent.  Trach was changed to #8 distal cuffed Shiley.  Patient seen by Dermatology for back lesions.  One diagnosed as a seborrheic keratitis and the other a dermatofibroma.  Intermittent abdominal pain throughout hospital course, at times relieved with gas/BM, w/u negative, seen by GI - no recs.  12/10 pt completed cipro and keflex for osteo treatment.  12/13 moderate amounts of secretions w/ blood - tranexamic acid neb given with notable improvement.  12/18 with blood tinged secretion again - TXA 250mg neb x2 doses.  12/19 spiked fever to 102 - pancultured, negative w/u.  12/19 silver Nitrate applied to PEG site by GI for leakage.  Pt has since remained medically stable.  1/7-1/8 Generalized pain, starting in forearm and BL shoulders in which an xray was performed and was negative for acute fractures/dislocation. doppler negative for DVT.  Will continue with pain management as needed.  Receives continuous pulmonary toileting w/ duoneb, chest PT, and suctioning PRN. 72 y/o F with PMHx of T2DM, HTN, HLD, anemia, hypothyroidism, RA, fibromyalgia, remote cerebral aneurysm repair, acute hypercapnic respiratory failure now with tracheostomy, PEG tube, and bedbound (trach change 8/18, PEG change 8/14), sacral pressure ulcer, BIBEMS from home for 6 day hx of bleeding from the tracheostomy and PEG tube with associated abdominal pain, recent history of auditory and visual hallucinations, and with chronic sacral decubitus ulcer. Exam notable for mild abdominal distention with LLQ and RLQ tenderness, tracheostomy in place with no obvious bleeding, PEG tube with scant amount of dried blood, at baseline neurologic status. Imaging showing no active bleeding around tracheostomy site, however was notable for mild thickening along PEG tube tract, sacral ulcer extending to the coccyx suggestive of possible osteomyelitis, and bibasilar patchy lung opacities with volume loss. Patient admitted for respiratory support, wound care of tracheostomy and PEG, and management of sacral osteomyelitis. No further Trach and peg site bleeding noted since admission.  Pelvic MRI imaging also suggestive of Acute OM. Patient placed on long-term antibiotics with Infectious disease guidance.  Additionally treated with a 7d course of Cefepime due to PSA and Serratia tracheitis on 11/8-->11/15 and then resumed cipro/keflex.  Hospital course c/b Delirium for which Seroquel was added and home Lexapro continued. Tracheostomy changed to #9 Cuffed Portex by ENT 11/3.  Despite trach change patient remained with persistent air leak.  On 11/19, the trach was again changed due to leak and loss of volumes on vent.  Trach was changed to #8 distal cuffed Shiley.  Patient seen by Dermatology for back lesions.  One diagnosed as a seborrheic keratitis and the other a dermatofibroma.  Intermittent abdominal pain throughout hospital course, at times relieved with gas/BM, w/u negative, seen by GI - no recs.  12/10 pt completed cipro and keflex for osteo treatment.  12/13 moderate amounts of secretions w/ blood - tranexamic acid neb given with notable improvement.  12/18 with blood tinged secretion again - TXA 250mg neb x2 doses.  12/19 spiked fever to 102 - pancultured, negative w/u.  12/19 silver Nitrate applied to PEG site by GI for leakage.  Pt has since remained medically stable.  1/7-1/8 Generalized pain, starting in forearm and BL shoulders in which an xray was performed and was negative for acute fractures/dislocation. doppler negative for DVT.  Will continue with pain management as needed.  Receives continuous pulmonary toileting w/ duoneb, chest PT, and suctioning PRN. Ms. Woods is a 74 year old woman with PMHx of T2DM, HTN, HLD, anemia, hypothyroidism, RA, fibromyalgia, remote cerebral aneurysm repair, acute hypercapnic respiratory failure now with tracheostomy, PEG tube, and bedbound (trach change 8/18, PEG change 8/14), sacral pressure ulcer, BIBEMS from home for 6 day hx of bleeding from the tracheostomy and PEG tube with associated abdominal pain, recent history of auditory and visual hallucinations, and with chronic sacral decubitus ulcer. Exam notable for mild abdominal distention with LLQ and RLQ tenderness, tracheostomy in place with no obvious bleeding, PEG tube with scant amount of dried blood, at baseline neurologic status. Imaging showing no active bleeding around tracheostomy site, however was notable for mild thickening along PEG tube tract, sacral ulcer extending to the coccyx suggestive of possible osteomyelitis, and bibasilar patchy lung opacities with volume loss. Patient admitted for respiratory support, wound care of tracheostomy and PEG, and management of sacral osteomyelitis. No further Trach and peg site bleeding noted since admission.  Pelvic MRI imaging also suggestive of Acute OM. Patient placed on long-term antibiotics with Infectious disease guidance.  Additionally treated with a 7d course of Cefepime due to PSA and Serratia tracheitis on 11/8-->11/15 and then resumed cipro/keflex.  Hospital course c/b Delirium for which Seroquel was added and home Lexapro continued. Tracheostomy changed to #9 Cuffed Port[place] by ENT 11/3.  Despite trach change patient remained with persistent air leak.  On 11/19, the trach was again changed due to leak and loss of volumes on vent.  Trach was changed to #8 distal cuffed Shiley.  Patient seen by Dermatology for back lesions.  One diagnosed as a seborrheic keratitis and the other a dermatofibroma.  Intermittent abdominal pain throughout hospital course, at times relieved with gas/BM, w/u negative, seen by GI - no recs.  12/10 pt completed cipro and keflex for osteo treatment.  12/13 moderate amounts of secretions w/ blood - tranexamic acid neb given with notable improvement.  12/18 with blood tinged secretion again - TXA 250mg neb x2 doses.  12/19 spiked fever to 102 - pancultured, negative w/u.  12/19 silver Nitrate applied to PEG site by GI for leakage.  PEG site with mild leakage however nothing else to be done and maintained with single gauze with surrounding skin intact.  Pt has since remained medically stable.  1/7-1/8 Generalized pain, starting in forearm and BL shoulders in which an xray was performed and was negative for acute fractures/dislocation. doppler negative for DVT.  Started on 5 day prednisone taper to address rheumatologic/inflammatory factors given history of rheumatoid arthritis and fibromyalgia.

## 2023-11-06 NOTE — PROGRESS NOTE ADULT - PROBLEM SELECTOR PLAN 7
-Home Levemir 14 units in morning held on admission as noted w/ hypoglycemia   -Continue home regimen with sliding scale - Home Levemir 14 units in morning held on admission as noted w/ hypoglycemia   -Continue home regimen with sliding scale

## 2023-11-06 NOTE — DISCHARGE NOTE PROVIDER - NSFOLLOWUPCLINICS_GEN_ALL_ED_FT
[facility] Rheumatology  Rheumatology  [address] [address]  Phone: [phone]  Fax:   Follow Up Time: Routine     [facility] Rheumatology  Rheumatology  [address] [address]  Phone: [phone]  Fax:   Follow Up Time: Routine     [facility] Rheumatology  Rheumatology  [address] [address]  Phone: [phone]  Fax:   Follow Up Time: Routine     [facility] Rheumatology  Rheumatology  [address] [address]  Phone: [phone]  Fax:   Follow Up Time: Routine     [facility] Rheumatology  Rheumatology  [address] [address]  Phone: [phone]  Fax:   Follow Up Time: Routine

## 2023-11-06 NOTE — DISCHARGE NOTE PROVIDER - INSTRUCTIONS
[facility] glucose support 1.2  Total Volume for 24 hours (mL): 1200  Continuous Starting Tube Feed Rate (mL per hr): 60  Increase Tube Feed Rate by (mL): 0  Unit Goal Tube Feed Rate (mL per hr): 60  Tube Feed Duration (in hrs): 20  Tube Feed Start Time: 0600  Tube Feed Stop Time: 0200  Free Water Flush  Pump Rate (mL pr hr): 10   Frequnecy: Every 2 hours  Alfred (7 Gm Argining/7 Glut/1.2 Gm HMP)  Qty per day: 2 [facility] glucose support 1.2  Total Volume for 24 hours (mL): 1200  Continuous Starting Tube Feed Rate (mL per hr): 60  Increase Tube Feed Rate by (mL): 0  Unit Goal Tube Feed Rate (mL per hr): 60  Tube Feed Duration (in hrs): 20  Tube Feed Start Time: 0600  Tube Feed Stop Time: 0200  Free Water Flush  Pump Rate (mL pr hr): 10   Frequnecy: Every 2 hours  Alfred (7 Gm Argining/7 Glut/1.2 Gm HMP)  Qty per day: 2 [facility] glucose support 1.2  Total Volume for 24 hours (mL): 1200  Continuous Starting Tube Feed Rate (mL per hr): 60  Increase Tube Feed Rate by (mL): 0  Unit Goal Tube Feed Rate (mL per hr): 60  Tube Feed Duration (in hrs): 20  Tube Feed Start Time: 0600  Tube Feed Stop Time: 0200  Free Water Flush  Pump Rate (mL pr hr): 10   Frequnecy: Every 2 hours  Alfred (7 Gm Argining/7 Glut/1.2 Gm HMP)  Qty per day: 2 [facility] glucose support 1.2  Total Volume for 24 hours (mL): 1200  Continuous Starting Tube Feed Rate (mL per hr): 60  Increase Tube Feed Rate by (mL): 0  Unit Goal Tube Feed Rate (mL per hr): 60  Tube Feed Duration (in hrs): 20  Tube Feed Start Time: 0600  Tube Feed Stop Time: 0200  Free Water Flush  Pump Rate (mL pr hr): 10   Frequnecy: Every 2 hours  Alfred (7 Gm Argining/7 Glut/1.2 Gm HMP)  Qty per day: 2 [facility] glucose support 1.2  Total Volume for 24 hours (mL): 1200  Continuous Starting Tube Feed Rate (mL per hr): 60  Increase Tube Feed Rate by (mL): 0  Unit Goal Tube Feed Rate (mL per hr): 60  Tube Feed Duration (in hrs): 20  Tube Feed Start Time: 0600  Tube Feed Stop Time: 0200  Free Water Flush  Pump Rate (mL pr hr): 10   Frequnecy: Every 2 hours  Alfred (7 Gm Argining/7 Glut/1.2 Gm HMP)  Qty per day: 2

## 2023-11-06 NOTE — PROVIDER CONTACT NOTE (OTHER) - BACKGROUND
Pt daughter states her mom should receive seroquel at 7pm & @9pm gabapentin because she is upset often; Daughter told mother that private aide is not staying overnight and patient started to cry

## 2023-11-06 NOTE — DISCHARGE NOTE PROVIDER - NPI NUMBER (FOR SYSADMIN USE ONLY) :
[[id number]],[[id number]],[[id number]],[[id number]] [[id number]],[[id number]],[[id number]],[UNKNOWN] [[id number]],[[id number]],[[id number]],[UNKNOWN] [[id number]],[[id number]],[[id number]],[UNKNOWN] [[id number]],[[id number]],[[id number]],[UNKNOWN] [[id number]],[[id number]],[[id number]] [[id number]],[[id number]],[[id number]] [[id number]],[[id number]],[[id number]] [[id number]],[[id number]],[[id number]] [[id number]],[[id number]],[[id number]] [[id number]],[[id number]],[[id number]],[[id number]] [[id number]],[[id number]],[[id number]],[[id number]] [[id number]],[[id number]],[[id number]],[[id number]] [[id number]],[[id number]],[[id number]],[[id number]] [[id number]],[[id number]],[[id number]],[[id number]]

## 2023-11-06 NOTE — PROVIDER CONTACT NOTE (OTHER) - ACTION/TREATMENT ORDERED:
MD made aware and updated daughter at bedside regarding her concerns; RN provide emotional support and comfort to patient

## 2023-11-06 NOTE — DISCHARGE NOTE PROVIDER - CARE PROVIDER_API CALL
Jhonny Daniel  Internal Medicine  [address] [place] [address]  Phone: [phone]  Fax: [phone]  Follow Up Time:     Andrew Morgan  Gastroenterology  [address] [place] [address]  Phone: [phone]  Fax: [phone]  Follow Up Time:     Thomas Ahn  Infectious Disease  [address] [place] [address]  Phone: [phone]  Fax: [phone]  Follow Up Time:     Dr. Berto Marshall    f/u as outpatient at [address] [phone]  Phone: (   )    -  Fax: (   )    -  Follow Up Time:    Jhonny Daniel  Internal Medicine  [address] [place] [address]  Phone: [phone]  Fax: [phone]  Follow Up Time:     Andrew Morgan  Gastroenterology  [address] [place] [address]  Phone: [phone]  Fax: [phone]  Follow Up Time:     Thomas Ahn  Infectious Disease  [address] [place] [address]  Phone: [phone]  Fax: [phone]  Follow Up Time:     Dr. Berto Marshall    f/u as outpatient at [address] [phone]  Phone: (   )    -  Fax: (   )    -  Follow Up Time:    Jhonny Daniel  Internal Medicine  [address] [place] [address]  Phone: [phone]  Fax: [phone]  Follow Up Time:     Andrew Morgan  Gastroenterology  [address] [place] [address]  Phone: [phone]  Fax: [phone]  Follow Up Time:     Thomas Ahn  Infectious Disease  [address] [place] [address]  Phone: [phone]  Fax: [phone]  Follow Up Time:     Dr. Berto Marshall    f/u as outpatient at [address] [phone]  Phone: (   )    -  Fax: (   )    -  Follow Up Time:    Jhonny Daniel  Internal Medicine  [address] [place] [address]  Phone: [phone]  Fax: [phone]  Follow Up Time:     Andrew Morgan  Gastroenterology  [address] [place] [address]  Phone: [phone]  Fax: [phone]  Follow Up Time:     Thomas Ahn  Infectious Disease  [address] [place] [address]  Phone: [phone]  Fax: [phone]  Follow Up Time:     Dr. Berto Marshall    f/u as outpatient at [address] [phone]  Phone: (   )    -  Fax: (   )    -  Follow Up Time:    Jhonny Daniel  Internal Medicine  [address] [place] [address]  Phone: [phone]  Fax: [phone]  Follow Up Time:     Andrew Morgan  Gastroenterology  [address] [place] [address]  Phone: [phone]  Fax: [phone]  Follow Up Time:     Thomas Ahn  Infectious Disease  [address] [place] [address]  Phone: [phone]  Fax: [phone]  Follow Up Time:     Dr. Berto Marshlal    f/u as outpatient at [address] [phone]  Phone: (   )    -  Fax: (   )    -  Follow Up Time:    Jhonny Daniel  Internal Medicine  [address] [place] [address]  Phone: [phone]  Fax: [phone]  Follow Up Time:     Andrew Morgan  Gastroenterology  [address] [place] [address]  Phone: [phone]  Fax: [phone]  Follow Up Time:     Thomas Ahn  Infectious Disease  [address] [place] [address]  Phone: [phone]  Fax: [phone]  Follow Up Time:    Jhonny Danile  Internal Medicine  [address] [place] [address]  Phone: [phone]  Fax: [phone]  Follow Up Time:     Andrew Morgan  Gastroenterology  [address] [place] [address]  Phone: [phone]  Fax: [phone]  Follow Up Time:     Thomas Ahn  Infectious Disease  [address] [place] [address]  Phone: [phone]  Fax: [phone]  Follow Up Time:    Jhonny Daniel  Internal Medicine  [address] [place] [address]  Phone: [phone]  Fax: [phone]  Follow Up Time:     Andrew Morgan  Gastroenterology  [address] [place] [address]  Phone: [phone]  Fax: [phone]  Follow Up Time:     Thomas Ahn  Infectious Disease  [address] [place] [address]  Phone: [phone]  Fax: [phone]  Follow Up Time:    Jhonny Daniel  Internal Medicine  [address] [place] [address]  Phone: [phone]  Fax: [phone]  Follow Up Time:     Anderw Morgan  Gastroenterology  [address] [place] [address]  Phone: [phone]  Fax: [phone]  Follow Up Time:     Thomas Ahn  Infectious Disease  [address] [place] [address]  Phone: [phone]  Fax: [phone]  Follow Up Time:    Jhonny Daniel  Internal Medicine  [address] [place] [address]  Phone: [phone]  Fax: [phone]  Follow Up Time:     Andrew Morgan  Gastroenterology  [address] [place] [address]  Phone: [phone]  Fax: [phone]  Follow Up Time:     Thomas Ahn  Infectious Disease  [address] [place] [address]  Phone: [phone]  Fax: [phone]  Follow Up Time:    Jhonny Daniel  Internal Medicine  [address] [place] [address]  Phone: [phone]  Fax: [phone]  Follow Up Time:     Andrew Morgan  Gastroenterology  [address] [place] [address]  Phone: [phone]  Fax: [phone]  Follow Up Time:     Thomas Ahn  Infectious Disease  [address] [place] [address]  Phone: [phone]  Fax: [phone]  Follow Up Time:     Graciela Egan  Rheumatology  [address] [facility], [address]  [place] [address]  Phone: [phone]  Fax: [phone]  Follow Up Time:    Jhonny Daniel  Internal Medicine  [address] [place] [address]  Phone: [phone]  Fax: [phone]  Follow Up Time:     Andrew Morgan  Gastroenterology  [address] [place] [address]  Phone: [phone]  Fax: [phone]  Follow Up Time:     Thomas Ahn  Infectious Disease  [address] [place] [address]  Phone: [phone]  Fax: [phone]  Follow Up Time:     Graciela Egan  Rheumatology  [address] [facility], [address]  [place] [address]  Phone: [phone]  Fax: [phone]  Follow Up Time:    Jhonny Daniel  Internal Medicine  [address] [place] [address]  Phone: [phone]  Fax: [phone]  Follow Up Time:     Andrew Morgan  Gastroenterology  [address] [place] [address]  Phone: [phone]  Fax: [phone]  Follow Up Time:     Thomas Ahn  Infectious Disease  [address] [place] [address]  Phone: [phone]  Fax: [phone]  Follow Up Time:     Graciela Egan  Rheumatology  [address] [facility], [address]  [place] [address]  Phone: [phone]  Fax: [phone]  Follow Up Time:    Jhonny Daniel  Internal Medicine  [address] [place] [address]  Phone: [phone]  Fax: [phone]  Follow Up Time:     Andrew Morgan  Gastroenterology  [address] [place] [address]  Phone: [phone]  Fax: [phone]  Follow Up Time:     Thomas Ahn  Infectious Disease  [address] [place] [address]  Phone: [phone]  Fax: [phone]  Follow Up Time:     Graciela Egan  Rheumatology  [address] [facility], [address]  [place] [address]  Phone: [phone]  Fax: [phone]  Follow Up Time:    Jhonny Daniel  Internal Medicine  [address] [place] [address]  Phone: [phone]  Fax: [phone]  Follow Up Time:     Andrew Morgan  Gastroenterology  [address] [place] [address]  Phone: [phone]  Fax: [phone]  Follow Up Time:     Thomas Ahn  Infectious Disease  [address] [place] [address]  Phone: [phone]  Fax: [phone]  Follow Up Time:     Graciela Egan  Rheumatology  [address] [facility], [address]  [place] [address]  Phone: [phone]  Fax: [phone]  Follow Up Time:

## 2023-11-06 NOTE — PROGRESS NOTE ADULT - ASSESSMENT
A/P: 74 y/o F with PMHx of T2DM, HTN, HLD, anemia, hypothyroidism, RA, fibromyalgia, remote cerebral aneurysm repair, acute hypercapnic respiratory failure now with tracheostomy, PEG tube, and bedbound (trach change 8/18, PEG change 8/14), sacral pressure ulcer, BIBEMS from home for 6 day hx of bleeding from the tracheostomy and PEG tube with associated abdominal pain, recent history of auditory and visual hallucinations, and with chronic sacral decubitus ulcer. Exam notable for mild abdominal distention with LLQ and RLQ tenderness, tracheostomy in place with no obvious bleeding, PEG tube with scant amount of dried blood, at baseline neurologic status. Imaging showing no active bleeding around tracheostomy site, however was notable for mild thickening along PEG tube tract, sacral ulcer extending to the coccyx suggestive of possible osteomyelitis, and bibasilar patchy lung opacities with volume loss. Patient admitted for respiratory support, wound care of tracheostomy and PEG, and management of presumed sacral osteomyelitis.    Wound Consult requested to assist w/ management of:  Sacral stage 4 pressure injury      Sacral wound- Aquacel dressing QD  Trach Mngt as per RCU  PEG Mngt as per GI  BLE elevation  Abx per RCU/ ID  Moisturize intact skin w/ SWEEN cream BID  Nutrition Consult for optimization          encourage high quality protein, ayush/ prosource, MVI & Vit C to promote wound healing  Hyperglycemia -  ADA diet and  FS w/ ISS,  Continue turning and positioning w/ offloading assistive devices as per protocol  Buttock Kanchan BID and prn soiling        Continue w/ attends under pads and Pericare w/ emerson cath maintenance as per protocol  Waffle Cushion to chair when oob to chair  Continue w/ low air loss pressure redistribution bed surface   Pt will need Group 2 mattress on hospital bed and ROHO cushion for wheel chair upon discharge home  Care as per RCU, will follow w/ you  Upon discharge f/u as outpatient at [facility] [address] [facility] [phone]  d/w attmaite  RN team   Angela Anthony PA-C CWS 39250  Nights/ Weekends/ Holidays please call:  General Surgery Consult pager [phone]) for emergencies  Wound PT for multilayer leg wrapping or VAC issues (x [phone])   I spent  35 minutes face to face w/ this pt of which more than 50% of the time was spent counseling & coordinating care of this pt.

## 2023-11-06 NOTE — PROGRESS NOTE ADULT - SUBJECTIVE AND OBJECTIVE BOX
Patient is a 73y old  Female who presents with a chief complaint of     HPI:  11/6/2023    Patient is at baseline mental state. On [facility] vent. Afebrile, no distress noted.    PAST MEDICAL & SURGICAL HISTORY:  Diabetes      Rheumatoid arthritis      Fibromyalgia      Hypothyroid      Hypertension      H/O tracheostomy      PEG (percutaneous endoscopic gastrostomy) status          ALLERY AND IMMUNOLOGIC: No hives or eczema    Allergies    penicillin (Unknown)  heparin (Unknown)  Tagamet (Unknown)  pineapple (Unknown)  walnut (Unknown)  Pecan, Filbert, Hazelnut (Unknown)  Lyrica (Unknown)    Intolerances        Social History:     FAMILY HISTORY:      MEDICATIONS  (STANDING):  acetaminophen   IVPB .. 1000 milliGRAM(s) IV Intermittent once  acetylcysteine 10%  Inhalation 4 milliLiter(s) Inhalation once  albuterol/ipratropium for Nebulization 3 milliLiter(s) Nebulizer every 6 hours  artificial  tears Solution 2 Drop(s) Both EYES every 6 hours  aztreonam  IVPB      aztreonam  IVPB 1000 milliGRAM(s) IV Intermittent every 8 hours  calcium carbonate    500 mG (Tums) Chewable 1 Tablet(s) Chew every 12 hours  chlorhexidine 0.12% Liquid 15 milliLiter(s) Oral Mucosa every 12 hours  chlorhexidine 4% Liquid 1 Application(s) Topical <User Schedule>  dextrose 50% Injectable 50 milliLiter(s) IV Push once  escitalopram 10 milliGRAM(s) Oral daily  fentaNYL   Patch  25 MICROgram(s)/Hr 1 Patch Transdermal every 72 hours  gabapentin Solution 100 milliGRAM(s) Enteral Tube at bedtime  insulin lispro (ADMELOG) corrective regimen sliding scale   SubCutaneous every 6 hours  levothyroxine 100 MICROGram(s) Enteral Tube daily  multivitamin/minerals/iron Oral Solution (CENTRUM) 15 milliLiter(s) Oral daily  pantoprazole   Suspension 40 milliGRAM(s) Enteral Tube daily  predniSONE   Tablet 5 milliGRAM(s) Oral daily  QUEtiapine 12.5 milliGRAM(s) Oral at bedtime  simethicone 80 milliGRAM(s) Chew four times a day  sodium chloride 3%  Inhalation 4 milliLiter(s) Inhalation every 6 hours  vancomycin  IVPB 1000 milliGRAM(s) IV Intermittent every 12 hours  zinc sulfate 220 milliGRAM(s) Oral daily    MEDICATIONS  (PRN):  sodium chloride 0.65% Nasal 2 Spray(s) Both Nostrils two times a day PRN Nasal Congestion          I&O's Summary    27 Oct 2023 07:01  -  28 Oct 2023 07:00  --------------------------------------------------------  IN: 200 mL / OUT: 400 mL / NET: -200 mL    28 Oct 2023 07:01  -  28 Oct 2023 19:00  --------------------------------------------------------  IN: 710 mL / OUT: 0 mL / NET: 710 mL        PHYSICAL EXAM:  Vital Signs Last 24 Hrs  T(C): 37 (06 Nov 2023 04:23), Max: 38.1 (05 Nov 2023 19:13)  T(F): 98.6 (06 Nov 2023 04:23), Max: 100.6 (05 Nov 2023 19:13)  HR: 63 (06 Nov 2023 09:10) (62 - 78)  BP: 153/78 (06 Nov 2023 04:23) (133/66 - 153/78)  BP(mean): --  RR: 12 (06 Nov 2023 04:23) (12 - 20)  SpO2: 100% (06 Nov 2023 09:10) (99% - 100%)    Parameters below as of 06 Nov 2023 04:23  Patient On (Oxygen Delivery Method): ventilator        GENERAL: NAD, well-developed  HEAD:  Atraumatic, Normocephalic  EYES: EOMI, PERRLA, conjunctiva and sclera clear  NECK: Supple, No JVD. Trach in place, connected to mech vent  CHEST/LUNG: Clear to auscultation bilaterally; No wheeze  HEART: Regular rate and rhythm; No murmurs, rubs, or gallops  ABDOMEN: Soft, tender, Nondistended; Bowel sounds present PEG in place  EXTREMITIES:  2+ Peripheral Pulses, No clubbing, cyanosis, or edema  PSYCH: AAOx3  NEUROLOGY: Bed bound, functionally quadriplegic  SKIN: No rashes or lesions    LABS:                        8.5    6.07  )-----------( 117      ( 28 Oct 2023 07:19 )             28.4     10-28    135  |  100  |  16  ----------------------------<  77  3.3<L>   |  27  |  <0.30<L>    Ca    8.3<L>      28 Oct 2023 07:19  Phos  3.2     10-28  Mg     1.9     10-28    TPro  6.7  /  Alb  3.0<L>  /  TBili  0.2  /  DBili  x   /  AST  21  /  ALT  23  /  AlkPhos  47  10-28    PT/INR - ( 27 Oct 2023 14:39 )   PT: 12.3 sec;   INR: 1.12 ratio         PTT - ( 27 Oct 2023 14:39 )  PTT:30.8 sec      Urinalysis Basic - ( 28 Oct 2023 07:19 )    Color: x / Appearance: x / SG: x / pH: x  Gluc: 77 mg/dL / Ketone: x  / Bili: x / Urobili: x   Blood: x / Protein: x / Nitrite: x   Leuk Esterase: x / RBC: x / WBC x   Sq Epi: x / Non Sq Epi: x / Bacteria: x        RADIOLOGY & ADDITIONAL TESTS:    Imaging Personally Reviewed:    Consultant(s) Notes Reviewed:      Care Discussed with Consultants/Other Providers:

## 2023-11-06 NOTE — DISCHARGE NOTE PROVIDER - PROVIDER TOKENS
PROVIDER:[TOKEN:[[id number]]],PROVIDER:[TOKEN:[[id number]]],PROVIDER:[TOKEN:[[id number]]],FREE:[LAST:[Rolando],PHONE:[(   )    -],FAX:[(   )    -],ADDRESS:[Dr. Thomson    f/u as outpatient at [address] [phone]]] PROVIDER:[TOKEN:[[id number]:[id number]]],PROVIDER:[TOKEN:[[id number]]],PROVIDER:[TOKEN:[[id number]]],FREE:[LAST:[Rolando],PHONE:[(   )    -],FAX:[(   )    -],ADDRESS:[Dr. Thomson    f/u as outpatient at [address] [phone]]] PROVIDER:[TOKEN:[[id number]:[id number]]],PROVIDER:[TOKEN:[[id number]:[id number]]],PROVIDER:[TOKEN:[[id number]:[id number]]],FREE:[LAST:[Rolando],PHONE:[(   )    -],FAX:[(   )    -],ADDRESS:[Dr. Thomson    f/u as outpatient at [address] [phone]]] PROVIDER:[TOKEN:[[id number]:[id number]]],PROVIDER:[TOKEN:[[id number]:[id number]]],PROVIDER:[TOKEN:[[id number]]],FREE:[LAST:[Rolando],PHONE:[(   )    -],FAX:[(   )    -],ADDRESS:[Dr. Thomson    f/u as outpatient at [address] [phone]]] PROVIDER:[TOKEN:[[id number]:[id number]]],PROVIDER:[TOKEN:[[id number]:[id number]]],PROVIDER:[TOKEN:[[id number]:[id number]]],FREE:[LAST:[Rolando],PHONE:[(   )    -],FAX:[(   )    -],ADDRESS:[Dr. Thomson    f/u as outpatient at [address] [phone]]] PROVIDER:[TOKEN:[[id number]:[id number]]],PROVIDER:[TOKEN:[[id number]:MIIS:[id number]]],PROVIDER:[TOKEN:[[id number]:MIIS:[id number]]] PROVIDER:[TOKEN:[[id number]:[id number]]],PROVIDER:[TOKEN:[[id number]:MIIS:[id number]]],PROVIDER:[TOKEN:[[id number]:MIIS:[id number]]] PROVIDER:[TOKEN:[[id number]:[id number]]],PROVIDER:[TOKEN:[[id number]:MIIS:[id number]]],PROVIDER:[TOKEN:[[id number]:MIIS:[id number]]] PROVIDER:[TOKEN:[[id number]:[id number]]],PROVIDER:[TOKEN:[[id number]:MIIS:[id number]]],PROVIDER:[TOKEN:[[id number]:MIIS:[id number]]] PROVIDER:[TOKEN:[[id number]:MIIS:[id number]]],PROVIDER:[TOKEN:[[id number]:MIIS:[id number]]],PROVIDER:[TOKEN:[[id number]:MIIS:[id number]]] PROVIDER:[TOKEN:[[id number]:[id number]]],PROVIDER:[TOKEN:[[id number]:MIIS:[id number]]],PROVIDER:[TOKEN:[[id number]:MIIS:[id number]]],PROVIDER:[TOKEN:[[id number]:MIIS:[id number]]] PROVIDER:[TOKEN:[[id number]:MIIS:[id number]]],PROVIDER:[TOKEN:[[id number]:MIIS:[id number]]],PROVIDER:[TOKEN:[[id number]:MIIS:[id number]]],PROVIDER:[TOKEN:[[id number]:MIIS:[id number]]] PROVIDER:[TOKEN:[[id number]:MIIS:[id number]]],PROVIDER:[TOKEN:[[id number]:MIIS:[id number]]],PROVIDER:[TOKEN:[[id number]:MIIS:[id number]]],PROVIDER:[TOKEN:[[id number]:MIIS:[id number]]] PROVIDER:[TOKEN:[[id number]:MIIS:[id number]]],PROVIDER:[TOKEN:[[id number]:MIIS:[id number]]],PROVIDER:[TOKEN:[[id number]:MIIS:[id number]]],PROVIDER:[TOKEN:[[id number]:MIIS:[id number]]] PROVIDER:[TOKEN:[[id number]:MIIS:[id number]]],PROVIDER:[TOKEN:[[id number]:MIIS:[id number]]],PROVIDER:[TOKEN:[[id number]:MIIS:[id number]]],PROVIDER:[TOKEN:[[id number]:MIIS:[id number]]]

## 2023-11-06 NOTE — DISCHARGE NOTE PROVIDER - NSDCMRMEDTOKEN_GEN_ALL_CORE_FT
#9 Portex tracheostomy: change inner cannula daily  acetaminophen 325 mg oral tablet: 2 tab(s) orally every 6 hours, As needed, Mild Pain (1 - 3)  alcohol swabs : Apply topically to affected area 4 times a day   ICD: E11.9  calcium acetate: 1000 milligram(s) orally once a day  Chelated Magnesium: 400 milligram(s) orally once a day  Chelated Zinc oral tablet: 25 milligram(s) orally once a day  folic acid 0.8 mg oral tablet: 1 tab(s) orally once a day  GenTeal ophthalmic gel: 1 dose(s) to each affected eye 2 times a day  glipiZIDE 5 mg oral tablet: 1 tab(s) orally once a day  glucometer (per patient&#x27;s insurance): 1 application subcutaneous 4 times a day   ICD: E11.9  Lab work :   lancets: 1 application subcutaneously 4 times a day   ICD: E11.9  metFORMIN 500 mg oral tablet: 1 tab(s) orally 2 times a day  pantoprazole 40 mg oral delayed release tablet: 1 tab(s) orally once a day (before a meal)  polyethylene glycol 3350 oral powder for reconstitution: 17 gram(s) orally once a day, As Needed  prednisoLONE acetate 1% ophthalmic suspension: 1 dose(s) to each affected eye 2 times a day  predniSONE 5 mg oral tablet: 1 tab(s) orally once a day  Prolia 60 mg/mL subcutaneous solution: subcutaneous every 6 months  Restasis 0.05% ophthalmic emulsion: 1 drop(s) to each affected eye every 12 hours  senna oral tablet: 2 tab(s) orally once a day (at bedtime)  simethicone 80 mg oral tablet, chewable: 1 tab(s) orally 3 times a day, As needed, Gas  solifenacin 5 mg oral tablet: 1 tab(s) orally once a day  sulfamethoxazole-trimethoprim 800 mg-160 mg oral tablet: 2 tab(s) orally 2 times a day  Synthroid 50 mcg (0.05 mg) oral tablet: 1 tab(s) orally once a day  Systane Ultra ophthalmic solution: 1 dose(s) to each affected eye 2 times a day  test strips (per patient&#x27;s insurance): 1 application subcutaneously 4 times a day . ** Compatible with patient&#x27;s glucometer **   ICD: E11.9  Toprol-XL 25 mg oral tablet, extended release: 1 tab(s) orally once a day   Vitamin D3 5000 intl units (125 mcg) oral tablet: orally once a day   #8 XLT Distal Cuffed Shiley: Tracheostomy tube inner cannulas  #9 Portex tracheostomy: change inner cannula daily  acetaminophen 650 mg/20.3 mL oral liquid: 20.3 milliliter(s) by gastrostomy tube every 6 hours as needed for  fever  acetylcysteine 10% inhalation solution: 600 milligram(s) orally  alcohol swabs : Apply topically to affected area 4 times a day   ICD: E11.9  amLODIPine 10 mg oral tablet: 1 tab(s) by gastrostomy tube once a day  aquacel: apply dressing daily as directed to sacral wound  calcium carbonate 400 mg/5 mL oral suspension: 500 milligram(s) by gastrostomy tube 2 times a day  diclofenac sodium 1% topical cream: Apply topically to affected area every 6 hours as needed for  mild pain apply to both arms and legs  DuoNeb 0.5 mg-2.5 mg/3 mL inhalation solution: 3 milliliter(s) by nebulizer every 6 hours as needed for  shortness of breath and/or wheezing  fentaNYL 25 mcg/hr transdermal film, extended release: 1 patch transdermally every 3 days  ferrous sulfate (as elemental iron) 15 mg/1.5 mL oral liquid: 3 milliliter(s) by gastrostomy tube once a day  gabapentin 250 mg/5 mL oral solution: 2 milliliter(s) by gastrostomy tube once a day (at bedtime)  glucometer (per patient&#x27;s insurance): 1 application subcutaneous 4 times a day   ICD: E11.9  Lab work :   lancets: 1 application subcutaneously 4 times a day   ICD: E11.9  Levemir FlexPen 100 units/mL subcutaneous solution: 14 unit(s) subcutaneous once a day (in the morning)  Lexapro 10 mg oral tablet: 1 tab(s) orally  Lexapro 5 mg/5 mL oral solution: 10 milliliter(s) by gastrostomy tube once a day  Mechanical Vent - Volume Ctrl Settings: Ventilator Mode: AC/CMV  Targeted SpO2 Range (%): 88-97  Tidal Volume: 300  Respiratory Rate: 12  FiO2: 30  PEEP/CPAP: 5  melatonin 1 mg/mL oral solution: 6 milliliter(s) by gastrostomy tube once a day (at bedtime)  Mucomyst-10 inhalation solution: 600 milligram(s) by nebulizer every 6 hours as needed for  congestion  oxyBUTYnin 5 mg oral tablet: 1 tab(s) by gastrostomy tube once a day  oxyCODONE 10 mg oral tablet: 1 tab(s) by gastrostomy tube every 6 hours as needed for  severe pain  pantoprazole 40 mg oral delayed release tablet: 1 tab(s) orally once a day (before a meal)  Please have respiratory therapy come to bedside at home to check patient&#x27;s ventilator and trial: .  predniSONE 5 mg oral tablet: 1 tab(s) orally once a day  Prolia 60 mg/mL subcutaneous solution: subcutaneous every 6 months  QUEtiapine 25 mg oral tablet: 0.5 tab(s) by gastrostomy tube once a day Given at 7PM  Saline Mist 0.65% nasal spray: 2 spray(s) intranasally every 4 hours as needed for  congestion  simethicone 50 mg/5 mL oral liquid: 125 milliliter(s) by gastrostomy tube every 6 hours  sodium chloride 0.9% inhalation solution: 3 milliliter(s) inhaled every 6 hours as needed for  congestion  Systane ophthalmic solution: 2 drop(s) in each eye every 6 hours both eyes  Systane Ultra ophthalmic solution: 1 dose(s) to each affected eye 2 times a day  test strips (per patient&#x27;s insurance): 1 application subcutaneously 4 times a day . ** Compatible with patient&#x27;s glucometer **   ICD: E11.9  Vitamin C with Анна Hips 500 mg oral tablet, chewable: 1 tab(s) by gastrostomy tube once a day   #8 XLT Distal Cuffed Shiley: Tracheostomy tube inner cannulas  #9 Portex tracheostomy: change inner cannula daily  acetaminophen 650 mg/20.3 mL oral liquid: 20.3 milliliter(s) by gastrostomy tube every 6 hours as needed for  fever  acetylcysteine 10% inhalation solution: 600 milligram(s) orally  Alcohol Pads: Use as Directed- 1 Box  alcohol swabs : Apply topically to affected area 4 times a day   ICD: E11.9  amLODIPine 10 mg oral tablet: 1 tab(s) by gastrostomy tube once a day  aquacel: apply dressing daily as directed to sacral wound  BD pen needles: Use as Directed- 1 Box  calcium carbonate 400 mg/5 mL oral suspension: 500 milligram(s) by gastrostomy tube 2 times a day  diclofenac sodium 1% topical cream: Apply topically to affected area every 6 hours as needed for  mild pain apply to both arms and legs  DuoNeb 0.5 mg-2.5 mg/3 mL inhalation solution: 3 milliliter(s) by nebulizer every 6 hours as needed for  shortness of breath and/or wheezing  fentaNYL 25 mcg/hr transdermal film, extended release: 1 patch transdermal every 72 hours  ferrous sulfate (as elemental iron) 15 mg/1.5 mL oral liquid: 3 milliliter(s) by gastrostomy tube once a day  gabapentin 250 mg/5 mL oral solution: 2 milliliter(s) by gastrostomy tube once a day (at bedtime)  glucometer (per patient&#x27;s insurance): 1 application subcutaneous 4 times a day   ICD: E11.9  insulin lispro 100 units/mL injectable solution: injectable 4 times a day As per Instructions below:   1 Unit(s) if Glucose 151 - 200  2 Unit(s) if Glucose 201 - 250  3 Unit(s) if Glucose 251 - 300  4 Unit(s) if Glucose 301 - 350  5 Unit(s) if Glucose 351 - 400  6 Unit(s) if Glucose Greater Than 400  insulin regular 100 units/mL human recombinant injectable solution: 24 international unit(s) injectable once a day Give at 6am  insulin regular 100 units/mL human recombinant injectable solution: 11 international unit(s) injectable 2 times a day Give at  12 Noon and 6 PM  ipratropium-albuterol 0.5 mg-2.5 mg/3 mL inhalation solution: 3 milliliter(s) inhaled every 6 hours  Lab work :   lancets: 1 application subcutaneously 4 times a day   ICD: E11.9  Lancets per patients insurance: Test 4x daily  Levemir FlexPen 100 units/mL subcutaneous solution: 18 unit(s) subcutaneous once a day (in the morning)  levothyroxine 125 mcg (0.125 mg) oral tablet: 1 tab(s) by gastrostomy tube once a day  Lexapro 10 mg oral tablet: 1 tab(s) by gastrostomy tube once a day Give at 8AM  Lexapro 5 mg/5 mL oral solution: 10 milliliter(s) by gastrostomy tube once a day  Mechanical Vent - Volume Ctrl Settings: Ventilator Mode: AC/CMV  Targeted SpO2 Range (%): 88-97  Tidal Volume: 300  Respiratory Rate: 12  FiO2: 30  PEEP/CPAP: 5  melatonin 1 mg/mL oral solution: 6 milliliter(s) by gastrostomy tube once a day (at bedtime)  Mucomyst-10 inhalation solution: 600 milligram(s) by nebulizer every 6 hours as needed for  congestion  oxyBUTYnin 5 mg oral tablet: 1 tab(s) by gastrostomy tube once a day  pantoprazole 40 mg oral delayed release tablet: 1 tab(s) orally once a day (before a meal)  Please have respiratory therapy come to bedside at home to check patient&#x27;s ventilator and trial: .  predniSONE 5 mg oral tablet: 1 tab(s) by gastrostomy tube once a day  Prolia 60 mg/mL subcutaneous solution: subcutaneous every 6 months  QUEtiapine 25 mg oral tablet: 0.5 tab(s) by gastrostomy tube once a day Given at 6PM  Saline Mist 0.65% nasal spray: 2 spray(s) intranasally every 4 hours as needed for  congestion  simethicone 50 mg/5 mL oral liquid: 125 milliliter(s) by gastrostomy tube every 6 hours  sodium chloride 0.9% inhalation solution: 3 milliliter(s) inhaled every 6 hours as needed for  congestion  Syringes for Humulin Regular Insulin: Use as instructed - 1 Box  Systane ophthalmic solution: 2 drop(s) in each eye every 6 hours both eyes  Systane Ultra ophthalmic solution: 1 dose(s) to each affected eye 2 times a day  test strip: Use as instructed, test 4x daily, Supply 1 Box  test strips (per patient&#x27;s insurance): 1 application subcutaneously 4 times a day . ** Compatible with patient&#x27;s glucometer **   ICD: E11.9  Vitamin C with Анна Hips 500 mg oral tablet, chewable: 1 tab(s) by gastrostomy tube once a day   #8 XLT Distal Cuffed Shiley: Tracheostomy tube inner cannulas  #9 Portex tracheostomy: change inner cannula daily  acetaminophen 650 mg/20.3 mL oral liquid: 20.3 milliliter(s) by gastrostomy tube every 6 hours as needed for  fever  acetylcysteine 10% inhalation solution: 600 milligram(s) orally  Alcohol Pads: Use as Directed- 1 Box  alcohol swabs : Apply topically to affected area 4 times a day   ICD: E11.9  amLODIPine 10 mg oral tablet: 1 tab(s) by gastrostomy tube once a day  aquacel: apply dressing daily as directed to sacral wound  BD pen needles: Use as Directed- 1 Box  calcium carbonate 400 mg/5 mL oral suspension: 500 milligram(s) by gastrostomy tube 2 times a day  Cavilon skin prep: apply daily to periwound as per insurance coverage  diclofenac sodium 1% topical cream: Apply topically to affected area every 6 hours as needed for  mild pain apply to both arms and legs  DuoNeb 0.5 mg-2.5 mg/3 mL inhalation solution: 3 milliliter(s) by nebulizer every 6 hours as needed for  shortness of breath and/or wheezing  fentaNYL 25 mcg/hr transdermal film, extended release: 1 patch transdermal every 72 hours  ferrous sulfate (as elemental iron) 15 mg/1.5 mL oral liquid: 3 milliliter(s) by gastrostomy tube once a day  gabapentin 250 mg/5 mL oral solution: 2 milliliter(s) by gastrostomy tube once a day (at bedtime)  Gauze: use for daily wound care as per insurance coverage  glucometer (per patient&#x27;s insurance): 1 application subcutaneous 4 times a day   ICD: E11.9  insulin lispro 100 units/mL injectable solution: injectable 4 times a day As per Instructions below:   1 Unit(s) if Glucose 151 - 200  2 Unit(s) if Glucose 201 - 250  3 Unit(s) if Glucose 251 - 300  4 Unit(s) if Glucose 301 - 350  5 Unit(s) if Glucose 351 - 400  6 Unit(s) if Glucose Greater Than 400  insulin regular 100 units/mL human recombinant injectable solution: 24 international unit(s) injectable once a day Give at 6am  insulin regular 100 units/mL human recombinant injectable solution: 11 international unit(s) injectable 2 times a day Give at  12 Noon and 6 PM  ipratropium-albuterol 0.5 mg-2.5 mg/3 mL inhalation solution: 3 milliliter(s) inhaled every 6 hours ICD 10 code  J68.3 dispense 30 day supply  ipratropium-albuterol 0.5 mg-2.5 mg/3 mL inhalation solution: 3 milliliter(s) inhaled every 6 hours ICD 10 code  J96.01, dispense 30 day supply  Lab work :   lancets: 1 application subcutaneously 4 times a day   ICD: E11.9  Lancets per patients insurance: Test 3x daily    ICD 10 E11.65  Levemir FlexPen 100 units/mL subcutaneous solution: 18 unit(s) subcutaneous once a day (in the morning)  levothyroxine 125 mcg (0.125 mg) oral tablet: 1 tab(s) by gastrostomy tube once a day  Lexapro 10 mg oral tablet: 1 tab(s) by gastrostomy tube once a day Give at 8AM  Mechanical Vent - Volume Ctrl Settings: Ventilator Mode: AC/CMV  Targeted SpO2 Range (%): 88-97  Tidal Volume: 300  Respiratory Rate: 12  FiO2: 30  PEEP/CPAP: 5  melatonin 1 mg/mL oral solution: 6 milliliter(s) by gastrostomy tube once a day (at bedtime)  Mucomyst-10 inhalation solution: 600 milligram(s) by nebulizer every 6 hours as needed for  congestion  oxyBUTYnin 5 mg oral tablet: 1 tab(s) by gastrostomy tube once a day  pantoprazole 40 mg oral delayed release tablet: 1 tab(s) orally once a day (before a meal)  Please have respiratory therapy come to bedside at home to check patient&#x27;s ventilator and trial: .  predniSONE 5 mg oral tablet: 1 tab(s) by gastrostomy tube once a day  preparation H: use rectally daily  Prolia 60 mg/mL subcutaneous solution: subcutaneous every 6 months  QUEtiapine 25 mg oral tablet: 0.5 tab(s) by gastrostomy tube once a day Given at 6PM  Saline Mist 0.65% nasal spray: 2 spray(s) intranasally every 4 hours as needed for  congestion  Saline Solution: use externally for wound care management as per insurance coverage  simethicone 80 mg oral tablet, chewable: 1 tab(s) enteral 4 times a day  sodium chloride 0.9% inhalation solution: 3 milliliter(s) inhaled every 6 hours as needed for  congestion  Syringes for Humulin Regular Insulin: Use as instructed - 1 Box  Systane ophthalmic solution: 2 drop(s) in each eye every 6 hours both eyes  Systane Ultra ophthalmic solution: 1 dose(s) to each affected eye 2 times a day  Tegaderm: As per insurance coverage/ 1 box  test strip: Use as instructed, test 3x daily, Supply 1 Box     ICD Code E11.65  test strips (per patient&#x27;s insurance): 1 application subcutaneously 4 times a day . ** Compatible with patient&#x27;s glucometer **   ICD: E11.9  Vitamin C with Анна Hips 500 mg oral tablet, chewable: 1 tab(s) by gastrostomy tube once a day   #8 XLT Distal Cuffed Shiley: Tracheostomy tube inner cannulas  #9 Portex tracheostomy: change inner cannula daily  acetaminophen 160 mg/5 mL oral suspension: 17.03 milliliter(s) by gastrostomy tube every 6 hours  acetylcysteine 10% inhalation solution: 600 milligram(s) orally  Alcohol Pads: Use as Directed- 1 Box  alcohol swabs : Apply topically to affected area 4 times a day   ICD: E11.9  amLODIPine 10 mg oral tablet: 1 tab(s) by gastrostomy tube once a day  aquacel: apply dressing daily as directed to sacral wound  BD pen needles: Use as Directed- 1 Box  calcium carbonate 500 mg (200 mg elemental calcium) oral tablet, chewable: 1 tab(s) by gastrostomy tube every 12 hours  Cavilon skin prep: apply daily to periwound as per insurance coverage  diclofenac sodium 1% topical cream: Apply topically to affected area every 6 hours as needed for  mild pain apply to both arms and legs  DuoNeb 0.5 mg-2.5 mg/3 mL inhalation solution: 3 milliliter(s) by nebulizer every 6 hours as needed for  shortness of breath and/or wheezing  fentaNYL 25 mcg/hr transdermal film, extended release: 1 patch transdermal every 72 hours  ferrous sulfate (as elemental iron) 15 mg/1.5 mL oral liquid: 3 milliliter(s) by gastrostomy tube once a day  gabapentin 250 mg/5 mL oral solution: 2 milliliter(s) by gastrostomy tube once a day (at bedtime)  Gauze: use for daily wound care as per insurance coverage  glucometer (per patient&#x27;s insurance): 1 application subcutaneous 4 times a day   ICD: E11.9  insulin lispro 100 units/mL injectable solution: injectable 4 times a day as needed for if needed As per Instructions below:   1 Unit(s) if Glucose 151 - 200  2 Unit(s) if Glucose 201 - 250  3 Unit(s) if Glucose 251 - 300  4 Unit(s) if Glucose 301 - 350  5 Unit(s) if Glucose 351 - 400  6 Unit(s) if Glucose Greater Than 400  insulin regular 100 units/mL human recombinant injectable solution: 24 international unit(s) injectable once a day Give at 6am  insulin regular 100 units/mL human recombinant injectable solution: 11 international unit(s) injectable 2 times a day Give at  12 Noon and 6 PM  ipratropium-albuterol 0.5 mg-2.5 mg/3 mL inhalation solution: 3 milliliter(s) inhaled every 6 hours ICD 10 code  J68.3 dispense 30 day supply  ipratropium-albuterol 0.5 mg-2.5 mg/3 mL inhalation solution: 3 milliliter(s) inhaled every 6 hours ICD 10 code  J96.01, dispense 30 day supply  Lab work :   lancets: 1 application subcutaneously 4 times a day   ICD: E11.9  Lancets per patients insurance: Test 3x daily    ICD 10 E11.65  Levemir FlexPen 100 units/mL subcutaneous solution: 18 unit(s) subcutaneous once a day (in the morning)  levothyroxine 125 mcg (0.125 mg) oral tablet: 1 tab(s) by gastrostomy tube once a day  Lexapro 10 mg oral tablet: 1 tab(s) by gastrostomy tube once a day Give at 8AM  Mechanical Vent - Volume Ctrl Settings: Ventilator Mode: AC/CMV  Targeted SpO2 Range (%): 88-97  Tidal Volume: 300  Respiratory Rate: 12  FiO2: 30  PEEP/CPAP: 5  melatonin 1 mg/mL oral solution: 6 milliliter(s) by gastrostomy tube once a day (at bedtime)  Mucomyst-10 inhalation solution: 600 milligram(s) by nebulizer every 6 hours as needed for  congestion  oxyBUTYnin 5 mg oral tablet: 1 tab(s) by gastrostomy tube once a day  pantoprazole 40 mg oral delayed release tablet: 1 tab(s) orally once a day (before a meal)  Please have respiratory therapy come to bedside at home to check patient&#x27;s ventilator and trial: .  predniSONE 5 mg/5 mL oral solution: 5 milliliter(s) orally  preparation H: use rectally daily  Preparation H Cooling 0.25% rectal gel: 1 application rectal once a day (at bedtime)  Prolia 60 mg/mL subcutaneous solution: subcutaneous every 6 months  QUEtiapine 25 mg oral tablet: 0.5 tab(s) by gastrostomy tube once a day Given at 6PM  Saline Mist 0.65% nasal spray: 2 spray(s) intranasally every 4 hours as needed for  congestion  Saline Solution: use externally for wound care management as per insurance coverage  simethicone 80 mg oral tablet, chewable: 1 tab(s) enteral 4 times a day  sodium chloride 0.9% inhalation solution: 3 milliliter(s) inhaled every 6 hours as needed for  congestion  Syringes for Humulin Regular Insulin: Use as instructed - 1 Box  Systane ophthalmic solution: 2 drop(s) in each eye every 6 hours both eyes  Systane Ultra ophthalmic solution: 1 dose(s) to each affected eye 2 times a day  Tegaderm: As per insurance coverage/ 1 box  test strip: Use as instructed, test 3x daily, Supply 1 Box     ICD Code E11.65  test strips (per patient&#x27;s insurance): 1 application subcutaneously 4 times a day . ** Compatible with patient&#x27;s glucometer **   ICD: E11.9  Vitamin C with Анна Hips 500 mg oral tablet, chewable: 1 tab(s) by gastrostomy tube once a day   #8 XLT Distal Cuffed Shiley: Tracheostomy tube inner cannulas  acetaminophen 160 mg/5 mL oral suspension: 17.03 milliliter(s) by gastrostomy tube every 6 hours  amLODIPine 10 mg oral tablet: 1 tab(s) by gastrostomy tube once a day  ascorbic acid 500 mg oral tablet: 1 tab(s) by gastrostomy tube once a day  benzocaine 20% topical spray: 1 Apply topically to affected area 4 times a day As needed Cough  calcium carbonate 500 mg (200 mg elemental calcium) oral tablet, chewable: 1 tab(s) by gastrostomy tube every 12 hours  Cavilon skin prep: apply daily to periwound as per insurance coverage  diclofenac 1% topical gel: Apply topically to affected area every 8 hours  diphenhydrAMINE 12.5 mg/5 mL oral liquid: 10 milliliter(s) by gastrostomy tube every 6 hours as needed for Rash and/or Itching  fentaNYL 25 mcg/hr transdermal film, extended release: 1 patch transdermal every 72 hours  gabapentin 250 mg/5 mL oral solution: 2 milliliter(s) by gastrostomy tube once a day (at bedtime)  Gauze: use for daily wound care as per insurance coverage  glucometer (per patient&#x27;s insurance): 1 application subcutaneous 4 times a day   ICD: E11.9  guaiFENesin 100 mg/5 mL oral liquid: 10 milliliter(s) orally every 6 hours as needed for  congestion  insulin glargine 100 units/mL subcutaneous solution: 19 unit(s) subcutaneous once a day Q AM  insulin lispro 100 units/mL injectable solution: injectable every 6 hours as needed for if needed As per Instructions below:   1 Unit(s) if Glucose 151 - 200  2 Unit(s) if Glucose 201 - 250  3 Unit(s) if Glucose 251 - 300  4 Unit(s) if Glucose 301 - 350  5 Unit(s) if Glucose 351 - 400  6 Unit(s) if Glucose Greater Than 400  insulin regular 100 units/mL human recombinant injectable solution: 34 international unit(s) injectable once a day Give at 6am  insulin regular 100 units/mL human recombinant injectable solution: 17 international unit(s) injectable once a day Give at  12 Noon  insulin regular 100 units/mL human recombinant injectable solution: 9 unit(s) injectable once a day Give at 6PM  ipratropium-albuterol 0.5 mg-2.5 mg/3 mL inhalation solution: 3 milliliter(s) inhaled every 6 hours ICD 10 code  J68.3 dispense 30 day supply  levothyroxine 125 mcg (0.125 mg) oral tablet: 1 tab(s) by gastrostomy tube once a day  Lexapro 10 mg oral tablet: 1 tab(s) by gastrostomy tube once a day Give at 8AM  lidocaine 2% mucous membrane solution: 5 milliliter(s) mucous membrane 2 times a day  lidocaine 4% topical film: Apply topically to affected area once a day to right hip  lidocaine 4% topical film: Apply topically to affected area once a day to left hip  lidocaine 4% topical film: Apply topically to affected area once a day to left shoulder  Mechanical Vent - Volume Ctrl Settings: Ventilator Mode: AC/CMV  Targeted SpO2 Range (%): 88-97  Tidal Volume: 300  Respiratory Rate: 12  FiO2: 30  PEEP/CPAP: 5  melatonin 1 mg/mL oral solution: 6 milliliter(s) by gastrostomy tube once a day (at bedtime)  Multiple Vitamins with Minerals oral liquid: 15 milliliter(s) by gastrostomy tube once a day  nystatin 100,000 units/g topical powder: 1 Apply topically to affected area every 8 hours  oxyCODONE 5 mg/5 mL oral solution: 5 milliliter(s) by gastrostomy tube every 6 hours as needed for Moderate Pain (4 - 6)  pantoprazole 40 mg oral granule, delayed release: 40 milligram(s) by gastrostomy tube once a day  Please have respiratory therapy come to bedside at home to check patient&#x27;s ventilator and trial: .  predniSONE 5 mg/5 mL oral solution: 5 milliliter(s) by gastrostomy tube once a day  Preparation H Cooling 0.25% rectal gel: 1 application rectal once a day (at bedtime)  QUEtiapine 25 mg oral tablet: 0.5 tab(s) by gastrostomy tube once a day Give 6PM  saliva substitutes oral solution: 5 milliliter(s) orally every 6 hours swish and spit  simethicone 80 mg oral tablet, chewable: 1 tab(s) by gastrostomy tube 4 times a day  sodium chloride 0.65% nasal spray: 1 spray(s) nasal 2 times a day both nostrils  Systane ophthalmic solution: 2 drop(s) in each eye every 6 hours both eyes  zinc oxide 40% topical ointment: Apply topically to affected area once a day   #8 XLT Distal Cuffed Shiley: Tracheostomy tube inner cannulas  acetaminophen 160 mg/5 mL oral suspension: 17.03 milliliter(s) by gastrostomy tube every 6 hours  Alcohol Pads: Use as Directed- 1 Box Daily  amLODIPine 10 mg oral tablet: 1 tab(s) by gastrostomy tube once a day  ascorbic acid 500 mg oral tablet: 1 tab(s) by gastrostomy tube once a day  BD pen needles: Use as Directed- 1 Box Daily  benzocaine 20% topical spray: 1 Apply topically to affected area 4 times a day As needed Cough  calcium carbonate 500 mg (200 mg elemental calcium) oral tablet, chewable: 1 tab(s) by gastrostomy tube every 12 hours  Cavilon skin prep: apply daily to periwound as per insurance coverage  diclofenac 1% topical gel: Apply topically to affected area every 8 hours  diphenhydrAMINE 12.5 mg/5 mL oral liquid: 10 milliliter(s) by gastrostomy tube every 6 hours as needed for Rash and/or Itching  fentaNYL 25 mcg/hr transdermal film, extended release: 1 patch transdermal every 72 hours  gabapentin 250 mg/5 mL oral solution: 2 milliliter(s) by gastrostomy tube once a day (at bedtime)  Gauze: use for daily wound care as per insurance coverage  glucometer (per patient&#x27;s insurance): 1 application subcutaneous 4 times a day   ICD: E11.9  guaiFENesin 100 mg/5 mL oral liquid: 10 milliliter(s) orally every 6 hours as needed for  congestion  insulin lispro 100 units/mL injectable solution: injectable every 6 hours as needed for if needed As per Instructions below:   1 Unit(s) if Glucose 151 - 200  2 Unit(s) if Glucose 201 - 250  3 Unit(s) if Glucose 251 - 300  4 Unit(s) if Glucose 301 - 350  5 Unit(s) if Glucose 351 - 400  6 Unit(s) if Glucose Greater Than 400  ipratropium-albuterol 0.5 mg-2.5 mg/3 mL inhalation solution: 3 milliliter(s) inhaled every 6 hours ICD 10 code  J68.3 dispense 30 day supply  Lancets per patients insurance: Test 3x daily    ICD 10 E11.65  Daily  levothyroxine 125 mcg (0.125 mg) oral tablet: 1 tab(s) by gastrostomy tube once a day  Lexapro 10 mg oral tablet: 1 tab(s) by gastrostomy tube once a day Give at 8AM  lidocaine 2% mucous membrane solution: 5 milliliter(s) mucous membrane 2 times a day  lidocaine 4% topical film: Apply topically to affected area once a day to right hip  lidocaine 4% topical film: Apply topically to affected area once a day to left hip  lidocaine 4% topical film: Apply topically to affected area once a day to left shoulder  Mechanical Vent - Volume Ctrl Settings: Ventilator Mode: AC/CMV  Targeted SpO2 Range (%): 88-97  Tidal Volume: 300  Respiratory Rate: 12  FiO2: 30  PEEP/CPAP: 5  melatonin 1 mg/mL oral solution: 6 milliliter(s) by gastrostomy tube once a day (at bedtime)  Multiple Vitamins with Minerals oral liquid: 15 milliliter(s) by gastrostomy tube once a day  nystatin 100,000 units/g topical powder: 1 Apply topically to affected area every 8 hours  oxyCODONE 5 mg/5 mL oral solution: 5 milliliter(s) by gastrostomy tube every 6 hours as needed for Moderate Pain (4 - 6)  pantoprazole 40 mg oral granule, delayed release: 40 milligram(s) by gastrostomy tube once a day  Please have respiratory therapy come to bedside at home to check patient&#x27;s ventilator and trial: .  predniSONE 5 mg/5 mL oral solution: 5 milliliter(s) by gastrostomy tube once a day  preparation H: use rectally   Daily  Preparation H Cooling 0.25% rectal gel: 1 application rectal once a day (at bedtime)  QUEtiapine 25 mg oral tablet: 0.5 tab(s) by gastrostomy tube once a day Give 6PM  saliva substitutes oral solution: 5 milliliter(s) orally every 6 hours swish and spit  simethicone 80 mg oral tablet, chewable: 1 tab(s) by gastrostomy tube 4 times a day  sodium chloride 0.65% nasal spray: 1 spray(s) nasal 2 times a day both nostrils  Syringes for Humulin Regular Insulin: Use as instructed - 1 Box Daily  Systane ophthalmic solution: 2 drop(s) in each eye every 6 hours both eyes  test strip: Use as instructed, test 3x daily, Supply 1 Box     ICD Code E11.65   Daily  zinc oxide 40% topical ointment: Apply topically to affected area once a day   #8 XLT Distal Cuffed Shiley: Tracheostomy tube inner cannulas  acetaminophen 160 mg/5 mL oral suspension: 17.03 milliliter(s) by gastrostomy tube every 6 hours  amLODIPine 10 mg oral tablet: 1 tab(s) by gastrostomy tube once a day  ascorbic acid 500 mg oral tablet: 1 tab(s) by gastrostomy tube once a day  benzocaine 20% topical spray: 1 Apply topically to affected area 4 times a day As needed Cough  calcium carbonate 500 mg (200 mg elemental calcium) oral tablet, chewable: 1 tab(s) by gastrostomy tube every 12 hours  Cavilon skin prep: apply daily to periwound as per insurance coverage  diclofenac 1% topical gel: Apply topically to affected area every 8 hours  diphenhydrAMINE 12.5 mg/5 mL oral liquid: 10 milliliter(s) by gastrostomy tube every 6 hours as needed for Rash and/or Itching  fentaNYL 25 mcg/hr transdermal film, extended release: 1 patch transdermal every 72 hours  gabapentin 250 mg/5 mL oral solution: 2 milliliter(s) by gastrostomy tube once a day (at bedtime)  Gauze: use for daily wound care as per insurance coverage  glucometer (per patient&#x27;s insurance): 1 application subcutaneous 4 times a day   ICD: E11.9  guaiFENesin 100 mg/5 mL oral liquid: 10 milliliter(s) orally every 6 hours as needed for  congestion  insulin lispro 100 units/mL injectable solution: injectable every 6 hours as needed for if needed As per Instructions below:   1 Unit(s) if Glucose 151 - 200  2 Unit(s) if Glucose 201 - 250  3 Unit(s) if Glucose 251 - 300  4 Unit(s) if Glucose 301 - 350  5 Unit(s) if Glucose 351 - 400  6 Unit(s) if Glucose Greater Than 400  ipratropium-albuterol 0.5 mg-2.5 mg/3 mL inhalation solution: 3 milliliter(s) inhaled every 6 hours ICD 10 code  J68.3 dispense 30 day supply  Lancets per patients insurance: Test 3x daily    ICD 10 E11.65   Daily  levothyroxine 125 mcg (0.125 mg) oral tablet: 1 tab(s) by gastrostomy tube once a day  Lexapro 10 mg oral tablet: 1 tab(s) by gastrostomy tube once a day Give at 8AM  lidocaine 2% mucous membrane solution: 5 milliliter(s) mucous membrane 2 times a day  lidocaine 4% topical film: Apply topically to affected area once a day to right hip  lidocaine 4% topical film: Apply topically to affected area once a day to left hip  lidocaine 4% topical film: Apply topically to affected area once a day to left shoulder  Mechanical Vent - Volume Ctrl Settings: Ventilator Mode: AC/CMV  Targeted SpO2 Range (%): 88-97  Tidal Volume: 300  Respiratory Rate: 12  FiO2: 30  PEEP/CPAP: 5  melatonin 1 mg/mL oral solution: 6 milliliter(s) by gastrostomy tube once a day (at bedtime)  Multiple Vitamins with Minerals oral liquid: 15 milliliter(s) by gastrostomy tube once a day  nystatin 100,000 units/g topical powder: 1 Apply topically to affected area every 8 hours  oxyCODONE 5 mg/5 mL oral solution: 5 milliliter(s) by gastrostomy tube every 6 hours as needed for Moderate Pain (4 - 6)  pantoprazole 40 mg oral granule, delayed release: 40 milligram(s) by gastrostomy tube once a day  Please have respiratory therapy come to bedside at home to check patient&#x27;s ventilator and trial: .  predniSONE 5 mg/5 mL oral solution: 5 milliliter(s) by gastrostomy tube once a day  Preparation H Cooling 0.25% rectal gel: 1 application rectal once a day (at bedtime)  QUEtiapine 25 mg oral tablet: 0.5 tab(s) by gastrostomy tube once a day Give 6PM  saliva substitutes oral solution: 5 milliliter(s) orally every 6 hours swish and spit  simethicone 80 mg oral tablet, chewable: 1 tab(s) by gastrostomy tube 4 times a day  sodium chloride 0.65% nasal spray: 1 spray(s) nasal 2 times a day both nostrils  Syringes for Humulin Regular Insulin: Use as instructed - 1 Box  Daily  Systane ophthalmic solution: 2 drop(s) in each eye every 6 hours both eyes  test strip: Use as instructed, test 3x daily, Supply 1 Box     ICD Code E11.65  Daily  zinc oxide 40% topical ointment: Apply topically to affected area once a day   #8 XLT Distal Cuffed Shiley: Tracheostomy tube inner cannulas  acetaminophen 160 mg/5 mL oral suspension: 17.03 milliliter(s) by gastrostomy tube every 6 hours  amLODIPine 10 mg oral tablet: 1 tab(s) by gastrostomy tube once a day  ascorbic acid 500 mg oral tablet: 1 tab(s) by gastrostomy tube once a day  benzocaine 20% topical spray: 1 Apply topically to affected area 4 times a day As needed Cough  calcium carbonate 500 mg (200 mg elemental calcium) oral tablet, chewable: 1 tab(s) by gastrostomy tube every 12 hours  Cavilon skin prep: apply daily to periwound as per insurance coverage  diclofenac 1% topical gel: Apply topically to affected area every 8 hours  diphenhydrAMINE 12.5 mg/5 mL oral liquid: 10 milliliter(s) by gastrostomy tube every 6 hours as needed for Rash and/or Itching  fentaNYL 25 mcg/hr transdermal film, extended release: 1 patch transdermal every 72 hours  gabapentin 250 mg/5 mL oral solution: 2 milliliter(s) by gastrostomy tube once a day (at bedtime)  Gauze: use for daily wound care as per insurance coverage  glucometer (per patient&#x27;s insurance): 1 application subcutaneous before meals and at bedtime  ICD: E11.9  guaiFENesin 100 mg/5 mL oral liquid: 10 milliliter(s) orally every 6 hours as needed for  congestion  insulin lispro 100 units/mL injectable solution: injectable every 6 hours as needed for if needed As per Instructions below:   1 Unit(s) if Glucose 151 - 200  2 Unit(s) if Glucose 201 - 250  3 Unit(s) if Glucose 251 - 300  4 Unit(s) if Glucose 301 - 350  5 Unit(s) if Glucose 351 - 400  6 Unit(s) if Glucose Greater Than 400  ipratropium-albuterol 0.5 mg-2.5 mg/3 mL inhalation solution: 3 milliliter(s) inhaled every 6 hours ICD 10 code  J68.3 dispense 30 day supply  Lancets per patients insurance: Test before meals and and bedtime   ICD 10 E11.65  levothyroxine 125 mcg (0.125 mg) oral tablet: 1 tab(s) by gastrostomy tube once a day  Lexapro 10 mg oral tablet: 1 tab(s) by gastrostomy tube once a day Give at 8AM  lidocaine 2% mucous membrane solution: 5 milliliter(s) mucous membrane 2 times a day  lidocaine 4% topical film: Apply topically to affected area once a day to right hip  lidocaine 4% topical film: Apply topically to affected area once a day to left hip  lidocaine 4% topical film: Apply topically to affected area once a day to left shoulder  Mechanical Vent - Volume Ctrl Settings: Ventilator Mode: AC/CMV  Targeted SpO2 Range (%): 88-97  Tidal Volume: 300  Respiratory Rate: 12  FiO2: 30  PEEP/CPAP: 5  melatonin 1 mg/mL oral solution: 6 milliliter(s) by gastrostomy tube once a day (at bedtime)  Multiple Vitamins with Minerals oral liquid: 15 milliliter(s) by gastrostomy tube once a day  nystatin 100,000 units/g topical powder: 1 Apply topically to affected area every 8 hours  oxyCODONE 5 mg/5 mL oral solution: 5 milliliter(s) by gastrostomy tube every 6 hours as needed for Moderate Pain (4 - 6)  pantoprazole 40 mg oral granule, delayed release: 40 milligram(s) by gastrostomy tube once a day  Please have respiratory therapy come to bedside at home to check patient&#x27;s ventilator and trial: .  predniSONE 5 mg/5 mL oral solution: 5 milliliter(s) by gastrostomy tube once a day  Preparation H Cooling 0.25% rectal gel: 1 application rectal once a day (at bedtime)  QUEtiapine 25 mg oral tablet: 0.5 tab(s) by gastrostomy tube once a day Give 6PM  saliva substitutes oral solution: 5 milliliter(s) orally every 6 hours swish and spit  simethicone 80 mg oral tablet, chewable: 1 tab(s) by gastrostomy tube 4 times a day  sodium chloride 0.65% nasal spray: 1 spray(s) nasal 2 times a day both nostrils  Systane ophthalmic solution: 2 drop(s) in each eye every 6 hours both eyes  test strip: Use as instructed, test 3x daily, Supply 1 Box  ICD Code E11.65  Daily  zinc oxide 40% topical ointment: Apply topically to affected area once a day   #8 XLT Distal Cuffed Shiley: Tracheostomy tube inner cannulas  acetaminophen 160 mg/5 mL oral suspension: 17.03 milliliter(s) by gastrostomy tube every 6 hours  amLODIPine 10 mg oral tablet: 1 tab(s) by gastrostomy tube once a day  ascorbic acid 500 mg oral tablet: 1 tab(s) by gastrostomy tube once a day  benzocaine 20% topical spray: 1 Apply topically to affected area 4 times a day As needed Cough  calcium carbonate 500 mg (200 mg elemental calcium) oral tablet, chewable: 1 tab(s) by gastrostomy tube every 12 hours  Cavilon skin prep: apply daily to periwound as per insurance coverage  diclofenac 1% topical gel: Apply topically to affected area every 8 hours  diphenhydrAMINE 12.5 mg/5 mL oral liquid: 10 milliliter(s) by gastrostomy tube every 6 hours as needed for Rash and/or Itching  fentaNYL 25 mcg/hr transdermal film, extended release: 1 patch transdermal every 72 hours  gabapentin 250 mg/5 mL oral solution: 2 milliliter(s) by gastrostomy tube once a day (at bedtime)  Gauze: use for daily wound care as per insurance coverage  glucometer (per patient&#x27;s insurance): 1 application subcutaneous before meals and at bedtime  ICD: E11.9  glucometer (per patient&#x27;s insurance): 1 application subcutaneous before meals and at bedtime  ICD: E11.9  guaiFENesin 100 mg/5 mL oral liquid: 10 milliliter(s) orally every 6 hours as needed for  congestion  insulin lispro 100 units/mL injectable solution: injectable every 6 hours as needed for if needed As per Instructions below:   1 Unit(s) if Glucose 151 - 200  2 Unit(s) if Glucose 201 - 250  3 Unit(s) if Glucose 251 - 300  4 Unit(s) if Glucose 301 - 350  5 Unit(s) if Glucose 351 - 400  6 Unit(s) if Glucose Greater Than 400  ipratropium-albuterol 0.5 mg-2.5 mg/3 mL inhalation solution: 3 milliliter(s) inhaled every 6 hours ICD 10 code  J68.3 dispense 30 day supply  Lancets per patients insurance: Test before meals and and bedtime   ICD 10 E11.65  levothyroxine 125 mcg (0.125 mg) oral tablet: 1 tab(s) by gastrostomy tube once a day  Lexapro 10 mg oral tablet: 1 tab(s) by gastrostomy tube once a day Give at 8AM  lidocaine 2% mucous membrane solution: 5 milliliter(s) mucous membrane 2 times a day  lidocaine 4% topical film: Apply topically to affected area once a day to right hip  lidocaine 4% topical film: Apply topically to affected area once a day to left hip  lidocaine 4% topical film: Apply topically to affected area once a day to left shoulder  Mechanical Vent - Volume Ctrl Settings: Ventilator Mode: AC/CMV  Targeted SpO2 Range (%): 88-97  Tidal Volume: 300  Respiratory Rate: 12  FiO2: 30  PEEP/CPAP: 5  melatonin 1 mg/mL oral solution: 6 milliliter(s) by gastrostomy tube once a day (at bedtime)  Multiple Vitamins with Minerals oral liquid: 15 milliliter(s) by gastrostomy tube once a day  nystatin 100,000 units/g topical powder: 1 Apply topically to affected area every 8 hours  oxyCODONE 5 mg/5 mL oral solution: 5 milliliter(s) by gastrostomy tube every 6 hours as needed for Moderate Pain (4 - 6)  pantoprazole 40 mg oral granule, delayed release: 40 milligram(s) by gastrostomy tube once a day  Please have respiratory therapy come to bedside at home to check patient&#x27;s ventilator and trial: .  predniSONE 5 mg/5 mL oral solution: 5 milliliter(s) by gastrostomy tube once a day  Preparation H Cooling 0.25% rectal gel: 1 application rectal once a day (at bedtime)  QUEtiapine 25 mg oral tablet: 0.5 tab(s) by gastrostomy tube once a day Give 6PM  saliva substitutes oral solution: 5 milliliter(s) orally every 6 hours swish and spit  simethicone 80 mg oral tablet, chewable: 1 tab(s) by gastrostomy tube 4 times a day  sodium chloride 0.65% nasal spray: 1 spray(s) nasal 2 times a day both nostrils  Systane ophthalmic solution: 2 drop(s) in each eye every 6 hours both eyes  test strip: Use as instructed, test 3x daily, Supply 1 Box  ICD Code E11.65  Daily  zinc oxide 40% topical ointment: Apply topically to affected area once a day   #8 XLT Distal Cuffed Shiley: Tracheostomy tube inner cannulas  acetaminophen 160 mg/5 mL oral suspension: 17.03 milliliter(s) by gastrostomy tube every 6 hours  amLODIPine 10 mg oral tablet: 1 tab(s) by gastrostomy tube once a day  ascorbic acid 500 mg oral tablet: 1 tab(s) by gastrostomy tube once a day  benzocaine 20% topical spray: 1 Apply topically to affected area 4 times a day As needed Cough  calcium carbonate 500 mg (200 mg elemental calcium) oral tablet, chewable: 1 tab(s) by gastrostomy tube every 12 hours  Cavilon skin prep: apply daily to periwound as per insurance coverage  diclofenac 1% topical gel: Apply topically to affected area every 8 hours  diphenhydrAMINE 12.5 mg/5 mL oral liquid: 10 milliliter(s) by gastrostomy tube every 6 hours as needed for Rash and/or Itching  fentaNYL 25 mcg/hr transdermal film, extended release: 1 patch transdermal every 72 hours  gabapentin 250 mg/5 mL oral solution: 2 milliliter(s) by gastrostomy tube once a day (at bedtime)  Gauze: use for daily wound care as per insurance coverage  glucometer (per patient&#x27;s insurance): 1 application subcutaneous every 6 hours  ICD: E11.9  glucometer (per patient&#x27;s insurance): 1 application subcutaneous before meals and at bedtime  ICD: E11.9  guaiFENesin 100 mg/5 mL oral liquid: 10 milliliter(s) orally every 6 hours as needed for  congestion  insulin lispro 100 units/mL injectable solution: injectable every 6 hours as needed for if needed As per Instructions below:   1 Unit(s) if Glucose 151 - 200  2 Unit(s) if Glucose 201 - 250  3 Unit(s) if Glucose 251 - 300  4 Unit(s) if Glucose 301 - 350  5 Unit(s) if Glucose 351 - 400  6 Unit(s) if Glucose Greater Than 400  ipratropium-albuterol 0.5 mg-2.5 mg/3 mL inhalation solution: 3 milliliter(s) inhaled every 6 hours ICD 10 code  J68.3 dispense 30 day supply  Lancets per patients insurance: Test before meals and and bedtime   ICD 10 E11.65  levothyroxine 125 mcg (0.125 mg) oral tablet: 1 tab(s) by gastrostomy tube once a day  Lexapro 10 mg oral tablet: 1 tab(s) by gastrostomy tube once a day Give at 8AM  lidocaine 2% mucous membrane solution: 5 milliliter(s) mucous membrane 2 times a day  lidocaine 4% topical film: Apply topically to affected area once a day to right hip  lidocaine 4% topical film: Apply topically to affected area once a day to left hip  lidocaine 4% topical film: Apply topically to affected area once a day to left shoulder  Mechanical Vent - Volume Ctrl Settings: Ventilator Mode: AC/CMV  Targeted SpO2 Range (%): 88-97  Tidal Volume: 300  Respiratory Rate: 12  FiO2: 30  PEEP/CPAP: 5  melatonin 1 mg/mL oral solution: 6 milliliter(s) by gastrostomy tube once a day (at bedtime)  Multiple Vitamins with Minerals oral liquid: 15 milliliter(s) by gastrostomy tube once a day  naloxone 4 mg/0.1 mL nasal spray: 1 spray(s) intranasally once as needed for overdose 1 spray q 2-3 minutes alternating between nostrils  nystatin 100,000 units/g topical powder: 1 Apply topically to affected area every 8 hours  oxyCODONE 5 mg/5 mL oral solution: 5 milliliter(s) by gastrostomy tube every 6 hours as needed for Moderate Pain (4 - 6)  pantoprazole 40 mg oral granule, delayed release: 40 milligram(s) by gastrostomy tube once a day  Please have respiratory therapy come to bedside at home to check patient&#x27;s ventilator and trial: .  predniSONE 5 mg/5 mL oral solution: 5 milliliter(s) by gastrostomy tube once a day  Preparation H Cooling 0.25% rectal gel: 1 application rectal once a day (at bedtime)  QUEtiapine 25 mg oral tablet: 0.5 tab(s) by gastrostomy tube once a day Give 6PM  saliva substitutes oral solution: 5 milliliter(s) orally every 6 hours swish and spit  simethicone 80 mg oral tablet, chewable: 1 tab(s) by gastrostomy tube 4 times a day  sodium chloride 0.65% nasal spray: 1 spray(s) nasal 2 times a day both nostrils  Systane ophthalmic solution: 2 drop(s) in each eye every 6 hours both eyes  test strip: Use as instructed, test 3x daily, Supply 1 Box  ICD Code E11.65  Daily  zinc oxide 40% topical ointment: Apply topically to affected area once a day   #8 XLT Distal Cuffed Shiley: Tracheostomy tube inner cannulas  acetaminophen 160 mg/5 mL oral suspension: 17.03 milliliter(s) by gastrostomy tube every 6 hours  amLODIPine 10 mg oral tablet: 1 tab(s) by gastrostomy tube once a day  ascorbic acid 500 mg oral tablet: 1 tab(s) by gastrostomy tube once a day  benzocaine 20% topical spray: 1 Apply topically to affected area 4 times a day As needed Cough  calcium carbonate 500 mg (200 mg elemental calcium) oral tablet, chewable: 1 tab(s) by gastrostomy tube every 12 hours  Cavilon skin prep: apply daily to periwound as per insurance coverage  diclofenac 1% topical gel: Apply topically to affected area every 8 hours  diphenhydrAMINE 12.5 mg/5 mL oral liquid: 10 milliliter(s) by gastrostomy tube every 6 hours as needed for Rash and/or Itching  fentaNYL 25 mcg/hr transdermal film, extended release: 1 patch transdermal every 72 hours  gabapentin 250 mg/5 mL oral solution: 2 milliliter(s) by gastrostomy tube once a day (at bedtime)  Gauze: use for daily wound care as per insurance coverage  glucometer (per patient&#x27;s insurance): 1 application subcutaneous before meals and at bedtime  ICD: E11.9  glucometer (per patient&#x27;s insurance): 1 application subcutaneous every 6  hours  ICD: E11.9  guaiFENesin 100 mg/5 mL oral liquid: 10 milliliter(s) orally every 6 hours as needed for  congestion  insulin regular 100 units/mL human recombinant injectable solution: 34 international unit(s) injectable once a day given at 6 am  insulin regular 100 units/mL human recombinant injectable solution: 6 unit(s) injectable once a day Give at 6PM  insulin regular 100 units/mL human recombinant injectable solution: 15 international unit(s) injectable once a day Give at  12 Noon  ipratropium-albuterol 0.5 mg-2.5 mg/3 mL inhalation solution: 3 milliliter(s) inhaled every 6 hours ICD 10 code  J68.3 dispense 30 day supply  Lancets per patients insurance: Test before meals and and bedtime   ICD 10 E11.65  levothyroxine 125 mcg (0.125 mg) oral tablet: 1 tab(s) by gastrostomy tube once a day  Lexapro 10 mg oral tablet: 1 tab(s) by gastrostomy tube once a day Give at 8AM  lidocaine 2% mucous membrane solution: 5 milliliter(s) mucous membrane 2 times a day  lidocaine 4% topical film: Apply topically to affected area once a day to right hip  lidocaine 4% topical film: Apply topically to affected area once a day to left hip  lidocaine 4% topical film: Apply topically to affected area once a day to left shoulder  Mechanical Vent - Volume Ctrl Settings: Ventilator Mode: AC/CMV  Targeted SpO2 Range (%): 88-97  Tidal Volume: 300  Respiratory Rate: 12  FiO2: 30  PEEP/CPAP: 5  melatonin 1 mg/mL oral solution: 6 milliliter(s) by gastrostomy tube once a day (at bedtime)  Multiple Vitamins with Minerals oral liquid: 15 milliliter(s) by gastrostomy tube once a day  naloxone 4 mg/0.1 mL nasal spray: 1 spray(s) intranasally once as needed for overdose 1 spray q 2-3 minutes alternating between nostrils  nystatin 100,000 units/g topical powder: 1 Apply topically to affected area every 8 hours  oxyCODONE 5 mg/5 mL oral solution: 5 milliliter(s) by gastrostomy tube every 6 hours as needed for Moderate Pain (4 - 6)  pantoprazole 40 mg oral granule, delayed release: 40 milligram(s) by gastrostomy tube once a day  Please have respiratory therapy come to bedside at home to check patient&#x27;s ventilator and trial: .  predniSONE 5 mg/5 mL oral solution: 5 milliliter(s) by gastrostomy tube once a day  Preparation H Cooling 0.25% rectal gel: 1 application rectal once a day (at bedtime)  QUEtiapine 25 mg oral tablet: 0.5 tab(s) by gastrostomy tube once a day Give 6PM  saliva substitutes oral solution: 5 milliliter(s) orally every 6 hours swish and spit  simethicone 80 mg oral tablet, chewable: 1 tab(s) by gastrostomy tube 4 times a day  sodium chloride 0.65% nasal spray: 1 spray(s) nasal 2 times a day both nostrils  Systane ophthalmic solution: 2 drop(s) in each eye every 6 hours both eyes  test strip: Use as instructed, test 3x daily, Supply 1 Box  ICD Code E11.65  Daily  zinc oxide 40% topical ointment: Apply topically to affected area once a day   #8 XLT Distal Cuffed Shiley: Tracheostomy tube inner cannulas  acetaminophen 160 mg/5 mL oral suspension: 17.03 milliliter(s) by gastrostomy tube every 6 hours  amLODIPine 10 mg oral tablet: 1 tab(s) by gastrostomy tube once a day  ascorbic acid 500 mg oral tablet: 1 tab(s) by gastrostomy tube once a day  benzocaine 20% topical spray: 1 Apply topically to affected area 4 times a day As needed Cough  calcium carbonate 500 mg (200 mg elemental calcium) oral tablet, chewable: 1 tab(s) by gastrostomy tube every 12 hours  Cavilon skin prep: apply daily to periwound as per insurance coverage  diclofenac 1% topical gel: Apply topically to affected area every 8 hours  diphenhydrAMINE 12.5 mg/5 mL oral liquid: 10 milliliter(s) by gastrostomy tube every 6 hours as needed for Rash and/or Itching  fentaNYL 25 mcg/hr transdermal film, extended release: 1 patch transdermal every 72 hours  gabapentin 250 mg/5 mL oral solution: 2 milliliter(s) by gastrostomy tube once a day (at bedtime)  Gauze: use for daily wound care as per insurance coverage  glucometer (per patient&#x27;s insurance): 1 application subcutaneous every 6  hours  ICD: E11.9  guaiFENesin 100 mg/5 mL oral liquid: 10 milliliter(s) orally every 6 hours as needed for  congestion  insulin regular 100 units/mL human recombinant injectable solution: 34 international unit(s) injectable once a day given at 6 am  insulin regular 100 units/mL human recombinant injectable solution: 6 unit(s) injectable once a day Give at 6PM  insulin regular 100 units/mL human recombinant injectable solution: 15 international unit(s) injectable once a day Give at  12 Noon  ipratropium-albuterol 0.5 mg-2.5 mg/3 mL inhalation solution: 3 milliliter(s) inhaled every 6 hours ICD 10 code  J68.3 dispense 30 day supply  Lancets per patients insurance: Test before meals and and bedtime   ICD 10 E11.65  levothyroxine 125 mcg (0.125 mg) oral tablet: 1 tab(s) by gastrostomy tube once a day  Lexapro 10 mg oral tablet: 1 tab(s) by gastrostomy tube once a day Give at 8AM  lidocaine 2% mucous membrane solution: 5 milliliter(s) mucous membrane 2 times a day  lidocaine 4% topical film: Apply topically to affected area once a day to right hip  lidocaine 4% topical film: Apply topically to affected area once a day to left hip  lidocaine 4% topical film: Apply topically to affected area once a day to left shoulder  Mechanical Vent - Volume Ctrl Settings: Ventilator Mode: AC/CMV  Targeted SpO2 Range (%): 88-97  Tidal Volume: 300  Respiratory Rate: 12  FiO2: 30  PEEP/CPAP: 5  melatonin 1 mg/mL oral solution: 6 milliliter(s) by gastrostomy tube once a day (at bedtime)  Multiple Vitamins with Minerals oral liquid: 15 milliliter(s) by gastrostomy tube once a day  naloxone 4 mg/0.1 mL nasal spray: 1 spray(s) intranasally once as needed for overdose 1 spray q 2-3 minutes alternating between nostrils  nystatin 100,000 units/g topical powder: 1 Apply topically to affected area every 8 hours  oxyCODONE 5 mg/5 mL oral solution: 5 milliliter(s) by gastrostomy tube every 6 hours as needed for Moderate Pain (4 - 6)  pantoprazole 40 mg oral granule, delayed release: 40 milligram(s) by gastrostomy tube once a day  Please have respiratory therapy come to bedside at home to check patient&#x27;s ventilator and trial: .  predniSONE 5 mg/5 mL oral solution: 5 milliliter(s) by gastrostomy tube once a day  Preparation H Cooling 0.25% rectal gel: 1 application rectal once a day (at bedtime)  QUEtiapine 25 mg oral tablet: 0.5 tab(s) by gastrostomy tube once a day Give 6PM  saliva substitutes oral solution: 5 milliliter(s) orally every 6 hours swish and spit  simethicone 80 mg oral tablet, chewable: 1 tab(s) by gastrostomy tube 4 times a day  sodium chloride 0.65% nasal spray: 1 spray(s) nasal 2 times a day both nostrils  Systane ophthalmic solution: 2 drop(s) in each eye every 6 hours both eyes  test strip: Use as instructed, test 3x daily, Supply 1 Box  ICD Code E11.65  Daily  zinc oxide 40% topical ointment: Apply topically to affected area once a day   #8 XLT Distal Cuffed Shiley: Tracheostomy tube inner cannulas  acetaminophen 160 mg/5 mL oral suspension: 17.03 milliliter(s) by gastrostomy tube every 6 hours  amLODIPine 10 mg oral tablet: 1 tab(s) by gastrostomy tube once a day  ascorbic acid 500 mg oral tablet: 1 tab(s) by gastrostomy tube once a day  benzocaine 20% topical spray: 1 Apply topically to affected area 4 times a day As needed Cough  calcium carbonate 500 mg (200 mg elemental calcium) oral tablet, chewable: 1 tab(s) by gastrostomy tube every 12 hours  Cavilon skin prep: apply daily to periwound as per insurance coverage  diclofenac 1% topical gel: Apply topically to affected area every 8 hours  diphenhydrAMINE 12.5 mg/5 mL oral liquid: 10 milliliter(s) by gastrostomy tube every 6 hours as needed for Rash and/or Itching  fentaNYL 25 mcg/hr transdermal film, extended release: 1 patch transdermal every 72 hours  gabapentin 250 mg/5 mL oral solution: 100 milligram(s) by gastrostomy tube once a day give at 6am  gabapentin 250 mg/5 mL oral solution: 100 milligram(s) by gastrostomy tube once a day give at 6 AM  gabapentin 250 mg/5 mL oral solution: 2 milliliter(s) by gastrostomy tube 2 times a day Give at 2pm and give at bedtime  Gauze: use for daily wound care as per insurance coverage  Glucagon Emergency Kit for Low Blood Sugar 1 mg injection: 1 milligram(s) intramuscularly once as needed for hypoglycemia Use for hypoglycemia ( BG &lt; 70) if pt not able to tolerate other treatment ( juice or glucose tablets)  glucometer (per patient&#x27;s insurance): 1 application subcutaneous every 6  hours  ICD: E11.9  guaiFENesin 100 mg/5 mL oral liquid: 10 milliliter(s) orally every 6 hours as needed for  congestion  ipratropium-albuterol 0.5 mg-2.5 mg/3 mL inhalation solution: 3 milliliter(s) inhaled every 6 hours ICD 10 code  J68.3 dispense 30 day supply  Lancets per patients insurance: Test before meals and and bedtime   ICD 10 E11.65  levothyroxine 125 mcg (0.125 mg) oral tablet: 1 tab(s) by gastrostomy tube once a day  Lexapro 10 mg oral tablet: 1 tab(s) by gastrostomy tube once a day Give at 8AM  lidocaine 2% mucous membrane solution: 5 milliliter(s) mucous membrane 2 times a day  lidocaine 4% topical film: Apply topically to affected area once a day to right hip  lidocaine 4% topical film: Apply topically to affected area once a day to left hip  lidocaine 4% topical film: Apply topically to affected area once a day to left shoulder  Mechanical Vent - Volume Ctrl Settings: Ventilator Mode: AC/CMV  Targeted SpO2 Range (%): 88-97  Tidal Volume: 300  Respiratory Rate: 12  FiO2: 30  PEEP/CPAP: 5  melatonin 1 mg/mL oral solution: 6 milliliter(s) by gastrostomy tube once a day (at bedtime)  Multiple Vitamins with Minerals oral liquid: 15 milliliter(s) by gastrostomy tube once a day  naloxone 4 mg/0.1 mL nasal spray: 1 spray(s) intranasally once as needed for overdose 1 spray q 2-3 minutes alternating between nostrils  NovoLIN R FlexPen 100 units/mL injectable solution: 19 unit(s) injectable 3 times a day --&gt;  (34 units at 6 am , 15 units at 12 noon, and 6 units at 18:00)  nystatin 100,000 units/g topical powder: 1 Apply topically to affected area every 8 hours  oxyCODONE 5 mg/5 mL oral solution: 5 milliliter(s) by gastrostomy tube every 6 hours as needed for  moderate pain  oxyCODONE 5 mg/5 mL oral solution: 5 milliliter(s) orally every 6 hours As needed Moderate Pain (4 - 6)  pantoprazole 40 mg oral granule, delayed release: 40 milligram(s) by gastrostomy tube once a day  Please have respiratory therapy come to bedside at home to check patient&#x27;s ventilator and trial: .  predniSONE 5 mg/5 mL oral solution: 5 milliliter(s) by gastrostomy tube once a day  Preparation H Cooling 0.25% rectal gel: 1 application rectal once a day (at bedtime)  QUEtiapine 25 mg oral tablet: 0.5 tab(s) by gastrostomy tube once a day Give 6PM  saliva substitutes oral solution: 5 milliliter(s) orally every 6 hours swish and spit  simethicone 80 mg oral tablet, chewable: 1 tab(s) by gastrostomy tube 4 times a day  sodium chloride 0.65% nasal spray: 1 spray(s) nasal 2 times a day both nostrils  Systane ophthalmic solution: 2 drop(s) in each eye every 6 hours both eyes  test strip: Use as instructed, test 3x daily, Supply 1 Box  ICD Code E11.65  Daily  zinc oxide 40% topical ointment: Apply topically to affected area once a day   #8 XLT Distal Cuffed Shiley: Tracheostomy tube inner cannulas  amLODIPine 10 mg oral tablet: 1 tab(s) by gastrostomy tube once a day  ascorbic acid 500 mg oral tablet: 1 tab(s) by gastrostomy tube once a day  benzocaine 20% topical spray: 1 Apply topically to affected area 4 times a day As needed Cough  calcium carbonate 500 mg (200 mg elemental calcium) oral tablet, chewable: 1 tab(s) by gastrostomy tube every 12 hours  Cavilon skin prep: apply daily to periwound as per insurance coverage  diclofenac 1% topical gel: Apply topically to affected area every 8 hours  diphenhydrAMINE 12.5 mg/5 mL oral liquid: 10 milliliter(s) by gastrostomy tube every 6 hours as needed for Rash and/or Itching  Gauze: use for daily wound care as per insurance coverage  Glucagon Emergency Kit for Low Blood Sugar 1 mg injection: 1 milligram(s) intramuscularly once as needed for hypoglycemia Use for hypoglycemia ( BG &lt; 70) if pt not able to tolerate other treatment ( juice or glucose tablets)  guaiFENesin 100 mg/5 mL oral liquid: 10 milliliter(s) orally every 6 hours as needed for  congestion  ipratropium-albuterol 0.5 mg-2.5 mg/3 mL inhalation solution: 3 milliliter(s) inhaled every 6 hours ICD 10 code  J68.3 dispense 30 day supply  levothyroxine 125 mcg (0.125 mg) oral tablet: 1 tab(s) by gastrostomy tube once a day  Lexapro 10 mg oral tablet: 1 tab(s) by gastrostomy tube once a day Give at 8AM  lidocaine 2% mucous membrane solution: 5 milliliter(s) mucous membrane 2 times a day  lidocaine 4% topical film: Apply topically to affected area once a day to right hip  lidocaine 4% topical film: Apply topically to affected area once a day to left hip  lidocaine 4% topical film: Apply topically to affected area once a day to left shoulder  Mechanical Vent - Volume Ctrl Settings: Ventilator Mode: AC/CMV  Targeted SpO2 Range (%): 88-97  Tidal Volume: 300  Respiratory Rate: 12  FiO2: 30  PEEP/CPAP: 5  melatonin 1 mg/mL oral solution: 6 milliliter(s) by gastrostomy tube once a day (at bedtime)  Multiple Vitamins with Minerals oral liquid: 15 milliliter(s) by gastrostomy tube once a day  naloxone 4 mg/0.1 mL nasal spray: 1 spray(s) intranasally once as needed for overdose 1 spray q 2-3 minutes alternating between nostrils  NovoLIN R FlexPen 100 units/mL injectable solution: 19 unit(s) injectable 3 times a day --&gt;  (34 units at 6 am , 15 units at 12 noon, and 6 units at 18:00)  nystatin 100,000 units/g topical powder: 1 Apply topically to affected area every 8 hours  oxyCODONE 5 mg/5 mL oral solution: 5 milliliter(s) orally every 6 hours as needed for Moderate Pain (4 - 6)  pantoprazole 40 mg oral granule, delayed release: 40 milligram(s) by gastrostomy tube once a day  Please have respiratory therapy come to bedside at home to check patient&#x27;s ventilator and trial: .  Preparation H Cooling 0.25% rectal gel: 1 application rectal once a day (at bedtime)  QUEtiapine 25 mg oral tablet: 0.5 tab(s) by gastrostomy tube once a day Give 6PM  saliva substitutes oral solution: 5 milliliter(s) orally every 6 hours swish and spit  simethicone 80 mg oral tablet, chewable: 1 tab(s) by gastrostomy tube 4 times a day  sodium chloride 0.65% nasal spray: 1 spray(s) nasal 2 times a day both nostrils  Systane ophthalmic solution: 2 drop(s) in each eye every 6 hours both eyes  zinc oxide 40% topical ointment: Apply topically to affected area once a day   #8 XLT Distal Cuffed Shiley: Tracheostomy tube inner cannulas  acetaminophen 160 mg/5 mL oral suspension: 17.03 milliliter(s) by gastrostomy tube every 6 hours  amLODIPine 10 mg oral tablet: 1 tab(s) by gastrostomy tube once a day  ascorbic acid 500 mg oral tablet: 1 tab(s) by gastrostomy tube once a day  benzocaine 20% topical spray: 1 Apply topically to affected area 4 times a day As needed Cough  calcium carbonate 500 mg (200 mg elemental calcium) oral tablet, chewable: 1 tab(s) by gastrostomy tube every 12 hours  Cavilon skin prep: apply daily to periwound as per insurance coverage  diclofenac 1% topical gel: Apply topically to affected area every 8 hours  diclofenac 1% topical gel: Apply topically to affected area every 8 hours  diphenhydrAMINE 12.5 mg/5 mL oral liquid: 10 milliliter(s) by gastrostomy tube every 6 hours as needed for Rash and/or Itching  fentaNYL 25 mcg/hr transdermal film, extended release: 1 patch transdermal every 72 hours MDD: 1 patch per 72 hours  gabapentin 250 mg/5 mL oral solution: 100 milligram(s) by gastrostomy tube 2 times a day give at 6am and 2PM  Gauze: use for daily wound care as per insurance coverage  Glucagon Emergency Kit for Low Blood Sugar 1 mg injection: 1 milligram(s) intramuscularly once as needed for hypoglycemia Use for hypoglycemia ( BG &lt; 70) if pt not able to tolerate other treatment ( juice or glucose tablets)  glucometer (per patient&#x27;s insurance): 1 application subcutaneous every 6  hours  ICD: E11.9  guaiFENesin 100 mg/5 mL oral liquid: 10 milliliter(s) orally every 6 hours as needed for  congestion  ipratropium-albuterol 0.5 mg-2.5 mg/3 mL inhalation solution: 3 milliliter(s) inhaled every 6 hours ICD 10 code  J68.3 dispense 30 day supply  Lancets per patients insurance: Test before meals and and bedtime   ICD 10 E11.65  levothyroxine 125 mcg (0.125 mg) oral tablet: 1 tab(s) by gastrostomy tube once a day  Lexapro 10 mg oral tablet: 1 tab(s) by gastrostomy tube once a day Give at 8AM  lidocaine 2% mucous membrane solution: 5 milliliter(s) mucous membrane 2 times a day  lidocaine 4% topical film: Apply topically to affected area once a day apply to right shoulder  Mechanical Vent - Volume Ctrl Settings: Ventilator Mode: AC/CMV  Targeted SpO2 Range (%): 88-97  Tidal Volume: 300  Respiratory Rate: 12  FiO2: 30  PEEP/CPAP: 5  melatonin 1 mg/mL oral solution: 6 milliliter(s) by gastrostomy tube once a day (at bedtime)  Multiple Vitamins with Minerals oral liquid: 15 milliliter(s) by gastrostomy tube once a day  naloxone 4 mg/0.1 mL nasal spray: 1 spray(s) intranasally once as needed for overdose 1 spray q 2-3 minutes alternating between nostrils  NovoLIN R FlexPen 100 units/mL injectable solution: 19 unit(s) injectable 3 times a day --&gt;  (34 units at 6 am , 15 units at 12 noon, and 6 units at 18:00)  nystatin 100,000 units/g topical powder: Apply topically to affected area every 8 hours  nystatin 100,000 units/g topical powder: 1 Apply topically to affected area every 8 hours  oxyCODONE 5 mg/5 mL oral solution: 5 milliliter(s) orally every 6 hours as needed for Moderate Pain (4 - 6) MDD: 20mg  pantoprazole 40 mg oral granule, delayed release: 40 milligram(s) by gastrostomy tube once a day  Please have respiratory therapy come to bedside at home to check patient&#x27;s ventilator and trial: .  Preparation H Cooling 0.25% rectal gel: 1 application rectal once a day (at bedtime)  QUEtiapine 25 mg oral tablet: 0.5 tab(s) by gastrostomy tube once a day Give 6PM  saliva substitutes oral solution: 5 milliliter(s) orally every 6 hours swish and spit  simethicone 80 mg oral tablet, chewable: 1 tab(s) by gastrostomy tube 4 times a day  sodium chloride 0.65% nasal spray: 1 spray(s) nasal 2 times a day both nostrils  Systane ophthalmic solution: 2 drop(s) in each eye every 6 hours both eyes  test strip: Use as instructed, test 3x daily, Supply 1 Box  ICD Code E11.65  Daily  zinc oxide 40% topical ointment: Apply topically to affected area once a day

## 2023-11-06 NOTE — PROGRESS NOTE ADULT - ASSESSMENT
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 Bleeding from gastrostomy. Site appears macerated.   PEG loosened today, please do not overly tighten    2. Leakage at PEG site.   -some leakage today, balloon inflated more    3. Abdominal pain. CT negative for acute pathology.  -rec miralax BID    4. Respiratory   per RCU

## 2023-11-06 NOTE — PROGRESS NOTE ADULT - NS ATTEND AMEND GEN_ALL_CORE FT
73F w/ DM, HTN, HLD, anemia, hypothyroidism, RA, fibromyalgia, remote cerebral aneursym repair, hypercapnic respiratory failure s/p tracheostomy/PEG, bedbound, sacral pressure ulcer. Admitted w/ bleeding from tracheostomy and PEG w/ associated abdominal pain, recent hx of auditory/visual hallucinations. Since admission, no further bleeding noted from either trach or PEG. Imaging showed mild thickening along PEG tube tract and sacral ulcer extending to coccyx suggestive of possible OM.     # Osteomyelitis  # Chronic hypoxemic RF  # oropharyngeal dysphagia  # acute on chronic pain  # Trach/Peg bleeding  # Tracheitis  # Hx of hallucaination, anxiety  - MRI showing Osteo, on abx per ID, c/w wound care, no plans for surgery  - C/W vent, adjust as needed, will need vent on discharge  - family requested S/S eval but unable to given dependence on vent  - C/W pain control, multifactorial 2/2 to fibromyalgia as well as now osteo with pressure ulcer  - No longer with any bleeding since rcu admission. continue to monitor.   - DVT ppx- lovenox  - Dispo- full code.

## 2023-11-06 NOTE — DISCHARGE NOTE PROVIDER - NSFOLLOWUPCLINICSTOKEN_GEN_ALL_ED_FT
697940:Routine|| ||00\01||False; 697725:Routine|| ||00\01||False; 486010:Routine|| ||00\01||False; 738882:Routine|| ||00\01||False; 625023:Routine|| ||00\01||False;

## 2023-11-06 NOTE — DISCHARGE NOTE PROVIDER - CARE PROVIDERS DIRECT ADDRESSES
,[email or web address],DirectAddress_Unknown,[email or web address],DirectAddress_Unknown ,[email or web address],DirectAddress_Unknown,[email or web address],DirectAddress_Unknown ,[email or web address],DirectAddress_Unknown,[email or web address],DirectAddress_Unknown ,[email or web address],DirectAddress_Unknown,[email or web address],DirectAddress_Unknown ,[email or web address],DirectAddress_Unknown,[email or web address],DirectAddress_Unknown ,[email or web address],DirectAddress_Unknown,[email or web address] ,[email or web address],DirectAddress_Unknown,[email or web address] ,[email or web address],DirectAddress_Unknown,[email or web address] ,[email or web address],DirectAddress_Unknown,[email or web address] ,[email or web address],DirectAddress_Unknown,[email or web address] ,[email or web address].[email or web address],DirectAddress_Unknown,[email or web address] ,[email or web address],DirectAddress_Unknown,[email or web address] ,[email or web address],[email or web address],[email or web address] ,[email or web address],DirectAddress_Unknown,[email or web address] ,[email or web address].[facility],DirectAddress_Unknown,[email or web address]

## 2023-11-06 NOTE — DISCHARGE NOTE PROVIDER - NSDCCPCAREPLAN_GEN_ALL_CORE_FT
PRINCIPAL DISCHARGE DIAGNOSIS  Diagnosis: Sepsis, unspecified organism  Assessment and Plan of Treatment:       SECONDARY DISCHARGE DIAGNOSES  Diagnosis: Sacral osteomyelitis  Assessment and Plan of Treatment:     Diagnosis: Acute respiratory failure with hypoxia  Assessment and Plan of Treatment:     Diagnosis: Abdominal pain  Assessment and Plan of Treatment:     Diagnosis: Rheumatoid arthritis  Assessment and Plan of Treatment:     Diagnosis: Type 2 diabetes mellitus  Assessment and Plan of Treatment:     Diagnosis: Hallucination  Assessment and Plan of Treatment:     Diagnosis: Need for prophylactic measure  Assessment and Plan of Treatment:      PRINCIPAL DISCHARGE DIAGNOSIS  Diagnosis: Sepsis, unspecified organism  Assessment and Plan of Treatment: Sepsis, unspecified organism.   ·  Plan: Found w/ Bilateral PNA vs Atelectasis, and Possible OM Sacrum   - S/p Chest CT (10/28):  c/w Bilateral PNA vs Atelectasis   - s/p Vanco, Aztreonam   - Blood cx: 11/12 -- neg   - Sputum cx + Pseudomonas aeruginosa, S. aureus  - Sputum Cx 11/6: + PSA and Serratia, Cefepime 11/8 -->11/15  - 11/26 sputum culture MDR pseudomonas - clinically stable, defer treatment unless decompensates as per ID  - 11/26 urine culture - enterococcus - no need to treat - cfu low, organism not significant as per ID  - 12/19 respiked fever - blood cultures NGTD - received dose of vanco and cefepime - d/c'd, holding antibiotics until further results.        SECONDARY DISCHARGE DIAGNOSES  Diagnosis: Acute respiratory failure with hypoxia  Assessment and Plan of Treatment: Problem: Acute respiratory failure with hypoxia.   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  - Tolerates intermittent PSV 15/5 during day/Vent HS  - Airway Clearance: Duoneb, Hypersal, Chest PT, PRN suctioning   - Maintain 02 sat > 92%      Diagnosis: Sacral osteomyelitis  Assessment and Plan of Treatment: - S/p CT abd/pelvis (10/28): Sacral decubitus ulcer extending to the coccyx with osseous remodeling and pelvic MRI or bone scan to recc to exclude osteomyelitis.  - 10/30 wound care note: Sacral stage 4 pressure injury 4.5cm x 2.5cm x 1.5cm w/ lip of undermining circumferentially greatest 9-12 of 3cm.  - MRI pelvis 10/30 with Osteomyelitis, completed Cefepime for 7 days, Vancomycin d/marli   - 11/10: resumed Cipro 500mg q 12 and Keflex 500mg q 6 until 12/10.  - 11/20: Changed to IV Cipro 400 q12 as recommended by ID pharmacist due to multiple drug interactions when given via PEG  - 11/28 wound care note: Sacral stage 4pressure injury 4.5cm x 2.5cm x 0.5cm, moist granular wound bed   palpable but not exposed bone no blistering, (+) serosanguinous drainage. No odor, erythema, increased warmth, tenderness, induration, fluctuance, nor crepitus.  - 12/3 wound culture ordered - d/c'd as no need for culture, wound improving, no signs of infection  - 12/10 completed cipro and keflex x5 week course  - wound care following.  - Oxycodone 2.5mg q6 hrs PRN (in addition to Tylenol PRN).  Problem/Plan - 8:  ·  Problem: Type 2 diabetes mellitus.   ·  Plan: - s/p Home Levemir 14 units in morning held on admission as noted w/ hypoglycemia   - Enteral feed changed to [employer] diabetic formula; glucose numbers stabilizing  - adjustments made throughout admission per endocrine recs.    Diagnosis: Type 2 diabetes mellitus  Assessment and Plan of Treatment: ·  Plan: - s/p Home Levemir 14 units in morning held on admission as noted w/ hypoglycemia   - Enteral feed changed to [employer] diabetic formula; glucose numbers stabilizing  - adjustments made throughout admission per endocrine recs.      Diagnosis: Rheumatoid arthritis  Assessment and Plan of Treatment:     Diagnosis: Abdominal pain  Assessment and Plan of Treatment:     Diagnosis: Hallucination  Assessment and Plan of Treatment:     Diagnosis: Need for prophylactic measure  Assessment and Plan of Treatment:      PRINCIPAL DISCHARGE DIAGNOSIS  Diagnosis: Sepsis, unspecified organism  Assessment and Plan of Treatment: Sepsis, unspecified organism.   ·  Plan: Found w/ Bilateral PNA vs Atelectasis, and Possible OM Sacrum   - S/p Chest CT (10/28):  c/w Bilateral PNA vs Atelectasis   - s/p Vanco, Aztreonam   - Blood cx: 11/12 -- neg   - Sputum cx + Pseudomonas aeruginosa, S. aureus  - Sputum Cx 11/6: + PSA and Serratia, Cefepime 11/8 -->11/15  - 11/26 sputum culture MDR pseudomonas - clinically stable, defer treatment unless decompensates as per ID  - 11/26 urine culture - enterococcus - no need to treat - cfu low, organism not significant as per ID  - 12/19 respiked fever - blood cultures NGTD - received dose of vanco and cefepime - d/c'd, holding antibiotics until further results.        SECONDARY DISCHARGE DIAGNOSES  Diagnosis: Acute respiratory failure with hypoxia  Assessment and Plan of Treatment: Problem: Acute respiratory failure with hypoxia.   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  - Tolerates intermittent PSV 15/5 during day/Vent HS  - Airway Clearance: Duoneb, Hypersal, Chest PT, PRN suctioning   - Maintain 02 sat > 92%      Diagnosis: Sacral osteomyelitis  Assessment and Plan of Treatment: - S/p CT abd/pelvis (10/28): Sacral decubitus ulcer extending to the coccyx with osseous remodeling and pelvic MRI or bone scan to recc to exclude osteomyelitis.  - 10/30 wound care note: Sacral stage 4 pressure injury 4.5cm x 2.5cm x 1.5cm w/ lip of undermining circumferentially greatest 9-12 of 3cm.  - MRI pelvis 10/30 with Osteomyelitis, completed Cefepime for 7 days, Vancomycin d/marli   - 11/10: resumed Cipro 500mg q 12 and Keflex 500mg q 6 until 12/10.  - 11/20: Changed to IV Cipro 400 q12 as recommended by ID pharmacist due to multiple drug interactions when given via PEG  - 11/28 wound care note: Sacral stage 4pressure injury 4.5cm x 2.5cm x 0.5cm, moist granular wound bed   palpable but not exposed bone no blistering, (+) serosanguinous drainage. No odor, erythema, increased warmth, tenderness, induration, fluctuance, nor crepitus.  - 12/3 wound culture ordered - d/c'd as no need for culture, wound improving, no signs of infection  - 12/10 completed cipro and keflex x5 week course  - wound care following.  - Oxycodone 2.5mg q6 hrs PRN (in addition to Tylenol PRN).  Problem/Plan - 8:  ·  Problem: Type 2 diabetes mellitus.   ·  Plan: - s/p Home Levemir 14 units in morning held on admission as noted w/ hypoglycemia   - Enteral feed changed to [employer] diabetic formula; glucose numbers stabilizing  - adjustments made throughout admission per endocrine recs.    Diagnosis: Type 2 diabetes mellitus  Assessment and Plan of Treatment: ·  Plan: - s/p Home Levemir 14 units in morning held on admission as noted w/ hypoglycemia   - Enteral feed changed to [employer] diabetic formula; glucose numbers stabilizing  - adjustments made throughout admission per endocrine recs.      Diagnosis: Rheumatoid arthritis  Assessment and Plan of Treatment:     Diagnosis: Abdominal pain  Assessment and Plan of Treatment:     Diagnosis: Hallucination  Assessment and Plan of Treatment:     Diagnosis: Need for prophylactic measure  Assessment and Plan of Treatment:      PRINCIPAL DISCHARGE DIAGNOSIS  Diagnosis: Sepsis, unspecified organism  Assessment and Plan of Treatment: Sepsis, unspecified organism.   ·  Plan: Found w/ Bilateral PNA vs Atelectasis, and Possible OM Sacrum   - S/p Chest CT (10/28):  c/w Bilateral PNA vs Atelectasis   - s/p Vanco, Aztreonam   - Blood cx: 11/12 -- neg   - Sputum cx + Pseudomonas aeruginosa, S. aureus  - Sputum Cx 11/6: + PSA and Serratia, Cefepime 11/8 -->11/15  - 11/26 sputum culture MDR pseudomonas - clinically stable, defer treatment unless decompensates as per ID  - 11/26 urine culture - enterococcus - no need to treat - cfu low, organism not significant as per ID  - 12/19 respiked fever - blood cultures NGTD - received dose of vanco and cefepime - d/c'd, holding antibiotics until further results.        SECONDARY DISCHARGE DIAGNOSES  Diagnosis: Acute respiratory failure with hypoxia  Assessment and Plan of Treatment: Problem: Acute respiratory failure with hypoxia.   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  - Tolerates intermittent PSV 15/5 during day/Vent HS  - Airway Clearance: Duoneb, Hypersal, Chest PT, PRN suctioning   - Maintain 02 sat > 92%      Diagnosis: Sacral osteomyelitis  Assessment and Plan of Treatment: - S/p CT abd/pelvis (10/28): Sacral decubitus ulcer extending to the coccyx with osseous remodeling and pelvic MRI or bone scan to recc to exclude osteomyelitis.  - 10/30 wound care note: Sacral stage 4 pressure injury 4.5cm x 2.5cm x 1.5cm w/ lip of undermining circumferentially greatest 9-12 of 3cm.  - MRI pelvis 10/30 with Osteomyelitis, completed Cefepime for 7 days, Vancomycin d/marli   - 11/10: resumed Cipro 500mg q 12 and Keflex 500mg q 6 until 12/10.  - 11/20: Changed to IV Cipro 400 q12 as recommended by ID pharmacist due to multiple drug interactions when given via PEG  - 11/28 wound care note: Sacral stage 4pressure injury 4.5cm x 2.5cm x 0.5cm, moist granular wound bed   palpable but not exposed bone no blistering, (+) serosanguinous drainage. No odor, erythema, increased warmth, tenderness, induration, fluctuance, nor crepitus.  - 12/3 wound culture ordered - d/c'd as no need for culture, wound improving, no signs of infection  - 12/10 completed cipro and keflex x5 week course  - wound care following.  - Oxycodone 2.5mg q6 hrs PRN (in addition to Tylenol PRN).  Problem/Plan - 8:  ·  Problem: Type 2 diabetes mellitus.   ·  Plan: - s/p Home Levemir 14 units in morning held on admission as noted w/ hypoglycemia   - Enteral feed changed to [employer] diabetic formula; glucose numbers stabilizing  - adjustments made throughout admission per endocrine recs.    Diagnosis: Type 2 diabetes mellitus  Assessment and Plan of Treatment: ·  Plan: - s/p Home Levemir 14 units in morning held on admission as noted w/ hypoglycemia   - Enteral feed changed to [employer] diabetic formula; glucose numbers stabilizing  - adjustments made throughout admission per endocrine recs.      Diagnosis: Rheumatoid arthritis  Assessment and Plan of Treatment:     Diagnosis: Abdominal pain  Assessment and Plan of Treatment:     Diagnosis: Hallucination  Assessment and Plan of Treatment:     Diagnosis: Need for prophylactic measure  Assessment and Plan of Treatment:      PRINCIPAL DISCHARGE DIAGNOSIS  Diagnosis: Sepsis, unspecified organism  Assessment and Plan of Treatment: Sepsis, unspecified organism.   ·  Plan: Found w/ Bilateral PNA vs Atelectasis, and Possible OM Sacrum   - S/p Chest CT (10/28):  c/w Bilateral PNA vs Atelectasis   - s/p Vanco, Aztreonam   - Blood cx: 11/12 -- neg   - Sputum cx + Pseudomonas aeruginosa, S. aureus  - Sputum Cx 11/6: + PSA and Serratia, Cefepime 11/8 -->11/15  - 11/26 sputum culture MDR pseudomonas - clinically stable, defer treatment unless decompensates as per ID  - 11/26 urine culture - enterococcus - no need to treat - cfu low, organism not significant as per ID  - 12/19 respiked fever - blood cultures NGTD - received dose of vanco and cefepime - d/c'd, holding antibiotics until further results.        SECONDARY DISCHARGE DIAGNOSES  Diagnosis: Acute respiratory failure with hypoxia  Assessment and Plan of Treatment: Problem: Acute respiratory failure with hypoxia.   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  - Tolerates intermittent PSV 15/5 during day/Vent HS  - Airway Clearance: Duoneb, Hypersal, Chest PT, PRN suctioning   - Maintain 02 sat > 92%      Diagnosis: Sacral osteomyelitis  Assessment and Plan of Treatment: - S/p CT abd/pelvis (10/28): Sacral decubitus ulcer extending to the coccyx with osseous remodeling and pelvic MRI or bone scan to recc to exclude osteomyelitis.  - 10/30 wound care note: Sacral stage 4 pressure injury 4.5cm x 2.5cm x 1.5cm w/ lip of undermining circumferentially greatest 9-12 of 3cm.  - MRI pelvis 10/30 with Osteomyelitis, completed Cefepime for 7 days, Vancomycin d/marli   - 11/10: resumed Cipro 500mg q 12 and Keflex 500mg q 6 until 12/10.  - 11/20: Changed to IV Cipro 400 q12 as recommended by ID pharmacist due to multiple drug interactions when given via PEG  - 11/28 wound care note: Sacral stage 4pressure injury 4.5cm x 2.5cm x 0.5cm, moist granular wound bed   palpable but not exposed bone no blistering, (+) serosanguinous drainage. No odor, erythema, increased warmth, tenderness, induration, fluctuance, nor crepitus.  - 12/3 wound culture ordered - d/c'd as no need for culture, wound improving, no signs of infection  - 12/10 completed cipro and keflex x5 week course  - wound care following.  - Oxycodone 2.5mg q6 hrs PRN (in addition to Tylenol PRN).  Problem/Plan - 8:  ·  Problem: Type 2 diabetes mellitus.   ·  Plan: - s/p Home Levemir 14 units in morning held on admission as noted w/ hypoglycemia   - Enteral feed changed to [employer] diabetic formula; glucose numbers stabilizing  - adjustments made throughout admission per endocrine recs.    Diagnosis: Type 2 diabetes mellitus  Assessment and Plan of Treatment: ·  Plan: - s/p Home Levemir 14 units in morning held on admission as noted w/ hypoglycemia   - Enteral feed changed to [employer] diabetic formula; glucose numbers stabilizing  - adjustments made throughout admission per endocrine recs.      Diagnosis: Rheumatoid arthritis  Assessment and Plan of Treatment:     Diagnosis: Abdominal pain  Assessment and Plan of Treatment:     Diagnosis: Hallucination  Assessment and Plan of Treatment:     Diagnosis: Need for prophylactic measure  Assessment and Plan of Treatment:      PRINCIPAL DISCHARGE DIAGNOSIS  Diagnosis: Sepsis, unspecified organism  Assessment and Plan of Treatment: Sepsis, unspecified organism.   ·  Plan: Found w/ Bilateral PNA vs Atelectasis, and Possible OM Sacrum   - S/p Chest CT (10/28):  c/w Bilateral PNA vs Atelectasis   - s/p Vanco, Aztreonam   - Blood cx: 11/12 -- neg   - Sputum cx + Pseudomonas aeruginosa, S. aureus  - Sputum Cx 11/6: + PSA and Serratia, Cefepime 11/8 -->11/15  - 11/26 sputum culture MDR pseudomonas - clinically stable, defer treatment unless decompensates as per ID  - 11/26 urine culture - enterococcus - no need to treat - cfu low, organism not significant as per ID  - 12/19 respiked fever - blood cultures NGTD - received dose of vanco and cefepime - d/c'd, holding antibiotics until further results.        SECONDARY DISCHARGE DIAGNOSES  Diagnosis: Acute respiratory failure with hypoxia  Assessment and Plan of Treatment: Problem: Acute respiratory failure with hypoxia.   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  - Tolerates intermittent PSV 15/5 during day/Vent HS  - Airway Clearance: Duoneb, Hypersal, Chest PT, PRN suctioning   - Maintain 02 sat > 92%      Diagnosis: Sacral osteomyelitis  Assessment and Plan of Treatment: - S/p CT abd/pelvis (10/28): Sacral decubitus ulcer extending to the coccyx with osseous remodeling and pelvic MRI or bone scan to recc to exclude osteomyelitis.  - 10/30 wound care note: Sacral stage 4 pressure injury 4.5cm x 2.5cm x 1.5cm w/ lip of undermining circumferentially greatest 9-12 of 3cm.  - MRI pelvis 10/30 with Osteomyelitis, completed Cefepime for 7 days, Vancomycin d/marli   - 11/10: resumed Cipro 500mg q 12 and Keflex 500mg q 6 until 12/10.  - 11/20: Changed to IV Cipro 400 q12 as recommended by ID pharmacist due to multiple drug interactions when given via PEG  - 11/28 wound care note: Sacral stage 4pressure injury 4.5cm x 2.5cm x 0.5cm, moist granular wound bed   palpable but not exposed bone no blistering, (+) serosanguinous drainage. No odor, erythema, increased warmth, tenderness, induration, fluctuance, nor crepitus.  - 12/3 wound culture ordered - d/c'd as no need for culture, wound improving, no signs of infection  - 12/10 completed cipro and keflex x5 week course  - wound care following.  - Oxycodone 2.5mg q6 hrs PRN (in addition to Tylenol PRN).  Problem/Plan - 8:  ·  Problem: Type 2 diabetes mellitus.   ·  Plan: - s/p Home Levemir 14 units in morning held on admission as noted w/ hypoglycemia   - Enteral feed changed to [employer] diabetic formula; glucose numbers stabilizing  - adjustments made throughout admission per endocrine recs.    Diagnosis: Type 2 diabetes mellitus  Assessment and Plan of Treatment: ·  Plan: - s/p Home Levemir 14 units in morning held on admission as noted w/ hypoglycemia   - Enteral feed changed to [employer] diabetic formula; glucose numbers stabilizing  - adjustments made throughout admission per endocrine recs.      Diagnosis: Rheumatoid arthritis  Assessment and Plan of Treatment:     Diagnosis: Abdominal pain  Assessment and Plan of Treatment:     Diagnosis: Hallucination  Assessment and Plan of Treatment:     Diagnosis: Need for prophylactic measure  Assessment and Plan of Treatment:      PRINCIPAL DISCHARGE DIAGNOSIS  Diagnosis: Sepsis, unspecified organism  Assessment and Plan of Treatment: Sepsis, unspecified organism.   ·  Plan: Found w/ Bilateral PNA vs Atelectasis, and Possible OM Sacrum   - S/p Chest CT (10/28):  c/w Bilateral PNA vs Atelectasis   - s/p Vanco, Aztreonam   - Blood cx: 11/12 -- neg   - Sputum cx + Pseudomonas aeruginosa, S. aureus  - Sputum Cx 11/6: + PSA and Serratia, Cefepime 11/8 -->11/15  - 11/26 sputum culture MDR pseudomonas - clinically stable, defer treatment unless decompensates as per ID  - 11/26 urine culture - enterococcus - no need to treat - cfu low, organism not significant as per ID  - 12/19 respiked fever - blood cultures NGTD - received dose of vanco and cefepime - d/c'd, holding antibiotics until further results.        SECONDARY DISCHARGE DIAGNOSES  Diagnosis: Sacral osteomyelitis  Assessment and Plan of Treatment: - S/p CT abd/pelvis (10/28): Sacral decubitus ulcer extending to the coccyx with osseous remodeling and pelvic MRI or bone scan to recc to exclude osteomyelitis.  - 10/30 wound care note: Sacral stage 4 pressure injury 4.5cm x 2.5cm x 1.5cm w/ lip of undermining circumferentially greatest 9-12 of 3cm.  - MRI pelvis 10/30 with Osteomyelitis, completed Cefepime for 7 days, Vancomycin d/marli   - 11/10: resumed Cipro 500mg q 12 and Keflex 500mg q 6 until 12/10.  - 11/20: Changed to IV Cipro 400 q12 as recommended by ID pharmacist due to multiple drug interactions when given via PEG  - 11/28 wound care note: Sacral stage 4pressure injury 4.5cm x 2.5cm x 0.5cm, moist granular wound bed   palpable but not exposed bone no blistering, (+) serosanguinous drainage. No odor, erythema, increased warmth, tenderness, induration, fluctuance, nor crepitus.  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  - Vent:  volume /12/40%/+5  - Tolerates intermittent PSV 15/5 during day/Vent HS  - Airway Clearance: Duoneb, Hypersal, Chest PT, PRN suctioning   - 12/29:  Unable to tolerate inline speaking valve.  Was able to phonate with partial cuff deflation thus encouraged to be used when family present ot facilitate communication.  Tracheal Bleeding (Intermittent)-->resolved since admission   - S/p CT Angio neck: No evidence of active bleeding around tracheostomy site. No hematoma.  No collection   - Seen by ENT; Car/vero and b/l bronchi visualized without any trauma, small amount of blood tinged secretions resting at beginning of right bronchus not actively bleeding.  - 12/13 tracheal bleeding upon suctioning - tranexamic neb given  - 12/18 tranexamic neb x2 doses - improved  - intermittent blood tinged secretions with suctioning likely 2/2 trauma from increased suctioning    Diagnosis: Abdominal pain  Assessment and Plan of Treatment: 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   - PEG Site appears macerated --> Multifactorial, possible the PEG has been too tight at home  - Peg loosened by GI at beside, no leakage or further intervention required   - recurrent RLQ abdominal pain and bleeding at the PEG site  - reevaluated by GI -- no bleeding at the PEG site  - 12/1: s/p abdominal ultrasound - limited study   - (12/3): Pt is c/o abd pain, and daughter is concerned   - AM amylase and Lipase all wnl , CT A/P 12/3 - no acute findings  - Continue Simethicone  PEG Leak  - 12/20 silver nitrate applied to PEG site by GI    Diagnosis: HTN (hypertension)  Assessment and Plan of Treatment: - Echo from 10/17/2020 notable for hyperdynamic L ventricular systolic function, moderately severe LVOT obstruction, and EF 81% (per Wilkinson calculation) vs 77% (per Teichholz calculation)  - Amlodipine 10mg QD    Diagnosis: Rheumatoid arthritis  Assessment and Plan of Treatment: - Continue home Prednisone regimen 5 mg daily   *fibromyalgia  - continue home Gabapentin 100mg daily  - continue home Fentanyl 25mcg Q72H patches  - c/w Lidocaine patch   - Oxycodone 2.5mg q6 hrs PRN (in addition to Tylenol PRN)    Diagnosis: Type 2 diabetes mellitus  Assessment and Plan of Treatment: - Insulin regimen as per Endocrine team  - Endocrine recommendations appreciated    Diagnosis: Hallucination  Assessment and Plan of Treatment: - Continue Home Lexapro 10mg daily   - Psych consult appreciated  - Continue Seroquel 12.5mg daily (6PM)    Diagnosis: Need for prophylactic measure  Assessment and Plan of Treatment: - DVT PPx: Lovenox discontinued given daughter's concern for trach and PEG stoma bleeding/oozing.  Also with mild thrombocytopenia.  Will continue SCDs.     PRINCIPAL DISCHARGE DIAGNOSIS  Diagnosis: Sepsis, unspecified organism  Assessment and Plan of Treatment: Sepsis, unspecified organism.   ·  Plan: Found w/ Bilateral PNA vs Atelectasis, and Possible OM Sacrum   - S/p Chest CT (10/28):  c/w Bilateral PNA vs Atelectasis   - s/p Vanco, Aztreonam   - Blood cx: 11/12 -- neg   - Sputum cx + Pseudomonas aeruginosa, S. aureus  - Sputum Cx 11/6: + PSA and Serratia, Cefepime 11/8 -->11/15  - 11/26 sputum culture MDR pseudomonas - clinically stable, defer treatment unless decompensates as per ID  - 11/26 urine culture - enterococcus - no need to treat - cfu low, organism not significant as per ID  - 12/19 respiked fever - blood cultures NGTD - received dose of vanco and cefepime - d/c'd, holding antibiotics until further results.        SECONDARY DISCHARGE DIAGNOSES  Diagnosis: Sacral osteomyelitis  Assessment and Plan of Treatment: - S/p CT abd/pelvis (10/28): Sacral decubitus ulcer extending to the coccyx with osseous remodeling and pelvic MRI or bone scan to recc to exclude osteomyelitis.  - 10/30 wound care note: Sacral stage 4 pressure injury 4.5cm x 2.5cm x 1.5cm w/ lip of undermining circumferentially greatest 9-12 of 3cm.  - MRI pelvis 10/30 with Osteomyelitis, completed Cefepime for 7 days, Vancomycin d/marli   - 11/10: resumed Cipro 500mg q 12 and Keflex 500mg q 6 until 12/10.  - 11/20: Changed to IV Cipro 400 q12 as recommended by ID pharmacist due to multiple drug interactions when given via PEG  - 11/28 wound care note: Sacral stage 4pressure injury 4.5cm x 2.5cm x 0.5cm, moist granular wound bed   palpable but not exposed bone no blistering, (+) serosanguinous drainage. No odor, erythema, increased warmth, tenderness, induration, fluctuance, nor crepitus.  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  - 12/29 & 1/3:  Unable to tolerate inline speaking valve.  Was able to phonate with partial cuff deflation thus encouraged to be used when family present ot facilitate communication.  - Vent:  volume /12/40%/+5  - Tolerates intermittent PSV 15/5 during day/Vent HS  - Airway Clearance: Duoneb, Hypersal, Chest PT, PRN suctioning   Tracheal Bleeding (Intermittent)-->resolved since admission   - S/p CT Angio neck: No evidence of active bleeding around tracheostomy site. No hematoma.  No collection   - Seen by ENT; Car/vero and b/l bronchi visualized without any trauma, small amount of blood tinged secretions resting at beginning of right bronchus not actively bleeding.  - 12/13 tracheal bleeding upon suctioning - tranexamic neb given  - 12/18 tranexamic neb x2 doses - improved  - intermittent blood tinged secretions with suctioning likely 2/2 trauma from increased suctioning    Diagnosis: Abdominal pain  Assessment and Plan of Treatment: 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   - PEG Site appears macerated --> Multifactorial, possible the PEG has been too tight at home  - Peg loosened by GI at beside, no leakage or further intervention required   - recurrent RLQ abdominal pain and bleeding at the PEG site  - reevaluated by GI -- no bleeding at the PEG site  - 12/1: s/p abdominal ultrasound - limited study   - (12/3): Pt is c/o abd pain, and daughter is concerned   - AM amylase and Lipase all wnl , CT A/P 12/3 - no acute findings  - Continue Simethicone  PEG Leak  - 12/20 silver nitrate applied to PEG site by GI    Diagnosis: HTN (hypertension)  Assessment and Plan of Treatment: - Echo from 10/17/2020 notable for hyperdynamic L ventricular systolic function, moderately severe LVOT obstruction, and EF 81% (per Wilkinson calculation) vs 77% (per Teichholz calculation)  - Amlodipine 10mg QD    Diagnosis: Rheumatoid arthritis  Assessment and Plan of Treatment: - Continue home Prednisone regimen 5 mg daily   *fibromyalgia  - continue home Gabapentin 100mg daily  - continue home Fentanyl 25mcg Q72H patches  - c/w Lidocaine patch   - Oxycodone 2.5mg q6 hrs PRN (in addition to Tylenol PRN)    Diagnosis: Type 2 diabetes mellitus  Assessment and Plan of Treatment: - Insulin regimen as per Endocrine team  - Endocrine recommendations appreciated    Diagnosis: Hallucination  Assessment and Plan of Treatment: - Continue Home Lexapro 10mg daily   - Psych consult appreciated  - Continue Seroquel 12.5mg daily (6PM)    Diagnosis: Need for prophylactic measure  Assessment and Plan of Treatment: - DVT PPx: Lovenox discontinued given daughter's concern for trach and PEG stoma bleeding/oozing.  Also with mild thrombocytopenia.  Will continue SCDs.     PRINCIPAL DISCHARGE DIAGNOSIS  Diagnosis: Sepsis, unspecified organism  Assessment and Plan of Treatment: Found w/ Bilateral PNA vs Atelectasis, and Possible OM Sacrum   - S/p Chest CT (10/28):  c/w Bilateral PNA vs Atelectasis   - s/p Vanco, Aztreonam   - Blood cx: 11/12 - neg   - Sputum cx + Pseudomonas aeruginosa, S. aureus  - Sputum Cx 11/6: + PSA and Serratia, Cefepime 11/8 -->11/15  - 11/26 sputum culture MDR pseudomonas - clinically stable, defer treatment unless decompensates as per ID  - 11/26 urine culture - enterococcus - no need to treat - cfu low, organism not significant as per ID  - 12/19 respiked fever - blood cultures Neg (12/19) - received dose of vanco and cefepime - d/c'd as w/FARAZ negative  - observe off Abx        SECONDARY DISCHARGE DIAGNOSES  Diagnosis: Sacral osteomyelitis  Assessment and Plan of Treatment: - S/p CT abd/pelvis (10/28): Sacral decubitus ulcer extending to the coccyx with osseous remodeling and pelvic MRI or bone scan to recc to exclude osteomyelitis.  - 10/30 wound care note: Sacral stage 4 pressure injury 4.5cm x 2.5cm x 1.5cm w/ lip of undermining circumferentially greatest 9-12 of 3cm.  - MRI pelvis 10/30 with Osteomyelitis, completed Cefepime for 7 days, Vancomycin d/marli   - 11/10: resumed Cipro 500mg q 12 and Keflex 500mg q 6 until 12/10.  - 11/20: Changed to IV Cipro 400 q12 as recommended by ID pharmacist due to multiple drug interactions when given via PEG  - 11/28 wound care note: Sacral stage 4pressure injury 4.5cm x 2.5cm x 0.5cm, moist granular wound bed   palpable but not exposed bone no blistering, (+) serosanguinous drainage. No odor, erythema, increased warmth, tenderness, induration, fluctuance, nor crepitus.  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  - 12/29 & 1/3:  Unable to tolerate inline speaking valve.  Was able to phonate with partial cuff deflation thus encouraged to be used when family present ot facilitate communication.  - Vent:  volume /12/40%/+5  - Tolerates intermittent PSV 15/5 during day/Vent HS  - Airway Clearance: Duoneb, Hypersal, Chest PT, PRN suctioning   Tracheal Bleeding (Intermittent)-->resolved since admission   - S/p CT Angio neck: No evidence of active bleeding around tracheostomy site. No hematoma.  No collection   - Seen by ENT; Car/vero and b/l bronchi visualized without any trauma, small amount of blood tinged secretions resting at beginning of right bronchus not actively bleeding.  - 12/13 tracheal bleeding upon suctioning - tranexamic neb given  - 12/18 tranexamic neb x2 doses - improved  - intermittent blood tinged secretions with suctioning likely 2/2 trauma from increased suctioning    Diagnosis: Abdominal pain  Assessment and Plan of Treatment: 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   - PEG Site appears macerated --> Multifactorial, possible the PEG has been too tight at home  - Peg loosened by GI at beside, no leakage or further intervention required   - recurrent RLQ abdominal pain and bleeding at the PEG site  - reevaluated by GI -- no bleeding at the PEG site  - 12/1: s/p abdominal ultrasound - limited study   - (12/3): Pt is c/o abd pain, and daughter is concerned   - AM amylase and Lipase all wnl , CT A/P 12/3 - no acute findings  - Continue Simethicone  PEG Leak  - 12/20 silver nitrate applied to PEG site by GI    Diagnosis: HTN (hypertension)  Assessment and Plan of Treatment: - Echo from 10/17/2020 notable for hyperdynamic L ventricular systolic function, moderately severe LVOT obstruction, and EF 81% (per Wilkinson calculation) vs 77% (per Teichholz calculation)  - Amlodipine 10mg QD    Diagnosis: Rheumatoid arthritis  Assessment and Plan of Treatment: - Continue home Prednisone regimen 5 mg daily   *fibromyalgia  - continue home Gabapentin 100mg daily  - continue home Fentanyl 25mcg Q72H patches  - c/w Lidocaine patch   - Oxycodone 2.5mg q6 hrs PRN (in addition to Tylenol PRN)  - 1/7-1/8 Right forearm and BL shoulder pain - w/FARAZ negative for DVT, imaging negative for acute fracture/dislocation, will continue with pain management as needed with meds above    Diagnosis: Type 2 diabetes mellitus  Assessment and Plan of Treatment: - Insulin regimen as per Endocrine team  - Endocrine recommendations appreciated    Diagnosis: Hallucination  Assessment and Plan of Treatment: - Continue Home Lexapro 10mg daily   - Psych consult appreciated  - Continue Seroquel 12.5mg daily (6PM)    Diagnosis: Need for prophylactic measure  Assessment and Plan of Treatment: - DVT PPx: Lovenox discontinued given daughter's concern for trach and PEG stoma bleeding/oozing.  Also with mild thrombocytopenia.  Will continue SCDs.     PRINCIPAL DISCHARGE DIAGNOSIS  Diagnosis: Sepsis, unspecified organism  Assessment and Plan of Treatment: Found w/ Bilateral PNA vs Atelectasis, and Possible OM Sacrum   - S/p Chest CT (10/28):  c/w Bilateral PNA vs Atelectasis   - s/p Vanco, Aztreonam   - Blood cx: 11/12 - neg   - Sputum cx + Pseudomonas aeruginosa, S. 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  - 12/29 & 1/3:  Unable to tolerate inline speaking valve.  Was able to phonate with partial cuff deflation thus encouraged to be used when family present ot facilitate communication.  - Vent:  volume /12/40%/+5  - Tolerates intermittent PSV 15/5 during day/Vent HS  - Airway Clearance: Duoneb, Hypersal, Chest PT, PRN suctioning   Tracheal Bleeding (Intermittent)-->resolved since admission   - S/p CT Angio neck: No evidence of active bleeding around tracheostomy site. No hematoma.  No collection   - Seen by ENT; Car/vero and b/l bronchi visualized without any trauma, small amount of blood tinged secretions resting at beginning of right bronchus not actively bleeding.  - 12/13 tracheal bleeding upon suctioning - tranexamic neb given  - 12/18 tranexamic neb x2 doses - improved  - intermittent blood tinged secretions with suctioning likely 2/2 trauma from increased suctioning    Diagnosis: Sacral osteomyelitis  Assessment and Plan of Treatment: - S/p CT abd/pelvis (10/28): Sacral decubitus ulcer extending to the coccyx with osseous remodeling and pelvic MRI or bone scan to recc to exclude osteomyelitis.  - 10/30 wound care note: Sacral stage 4 pressure injury 4.5cm x 2.5cm x 1.5cm w/ lip of undermining circumferentially greatest 9-12 of 3cm.  - MRI pelvis 10/30 with Osteomyelitis, completed Cefepime for 7 days, Vancomycin d/maril   - 11/10: resumed Cipro 500mg q 12 and Keflex 500mg q 6 until 12/10.  - 11/20: Changed to IV Cipro 400 q12 as recommended by ID pharmacist due to multiple drug interactions when given via PEG  - 11/28 wound care note: Sacral stage 4pressure injury 4.5cm x 2.5cm x 0.5cm, moist granular wound bed   palpable but not exposed bone no blistering, (+) serosanguinous drainage. No odor, erythema, increased warmth, tenderness, induration, fluctuance, nor crepitus.  - 12/3 wound culture ordered - d/c'd as no need for culture, wound improving, no signs of infection  - 12/10 completed cipro and keflex x5 week course  - wound improving  - wound care following    Diagnosis: Rheumatoid arthritis  Assessment and Plan of Treatment: - Continue home Prednisone regimen 5 mg daily   *fibromyalgia  - continue home Gabapentin 100mg daily  - continue home Fentanyl 25mcg Q72H patches  - c/w Lidocaine patch   - Oxycodone 2.5mg q6 hrs PRN (in addition to Tylenol PRN)  - 1/7-1/8 Right forearm and BL shoulder pain - w/FARAZ negative for DVT, imaging negative for acute fracture/dislocation, will continue with pain management as needed with meds above    Diagnosis: Abdominal pain  Assessment and Plan of Treatment: 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   - PEG Site appears macerated --> Multifactorial, possible the PEG has been too tight at home  - Peg loosened by GI at beside, no leakage or further intervention required   - recurrent RLQ abdominal pain and bleeding at the PEG site  - reevaluated by GI -- no bleeding at the PEG site  - 12/1: s/p abdominal ultrasound - limited study   - (12/3): Pt is c/o abd pain, and daughter is concerned   - AM amylase and Lipase all wnl , CT A/P 12/3 - no acute findings  - Continue Simethicone  PEG Leak  - 12/20 silver nitrate applied to PEG site by GI    Diagnosis: Type 2 diabetes mellitus  Assessment and Plan of Treatment: - Insulin regimen as per Endocrine team  - Endocrine recommendations appreciated    Diagnosis: Hallucination  Assessment and Plan of Treatment: - Continue Home Lexapro 10mg daily   - Psych consult appreciated  - Continue Seroquel 12.5mg daily (6PM)    Diagnosis: Need for prophylactic measure  Assessment and Plan of Treatment: - DVT PPx: Lovenox discontinued given daughter's concern for trach and PEG stoma bleeding/oozing.  Also with mild thrombocytopenia.  Will continue SCDs.    Diagnosis: HTN (hypertension)  Assessment and Plan of Treatment: - Echo from 10/17/2020 notable for hyperdynamic L ventricular systolic function, moderately severe LVOT obstruction, and EF 81% (per Wilkinson calculation) vs 77% (per Teichholz calculation)  - Amlodipine 10mg QD     PRINCIPAL DISCHARGE DIAGNOSIS  Diagnosis: Sepsis, unspecified organism  Assessment and Plan of Treatment: Found w/ Bilateral PNA vs Atelectasis, and Possible OM Sacrum   - S/p Chest CT (10/28):  c/w Bilateral PNA vs Atelectasis   - s/p Vanco, Aztreonam   - Blood cx: 11/12 - neg   - Sputum cx + Pseudomonas aeruginosa, S. 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  - 12/29 & 1/3:  Unable to tolerate inline speaking valve.  Was able to phonate with partial cuff deflation thus encouraged to be used when family present ot facilitate communication.  - Vent:  volume /12/40%/+5  - Tolerates intermittent PSV 15/5 during day/Vent HS  - Airway Clearance: Duoneb, Hypersal, Chest PT, PRN suctioning   Tracheal Bleeding (Intermittent)-->resolved since admission   - S/p CT Angio neck: No evidence of active bleeding around tracheostomy site. No hematoma.  No collection   - Seen by ENT; Car/vero and b/l bronchi visualized without any trauma, small amount of blood tinged secretions resting at beginning of right bronchus not actively bleeding.  - 12/13 tracheal bleeding upon suctioning - tranexamic neb given  - 12/18 tranexamic neb x2 doses - improved  - intermittent blood tinged secretions with suctioning likely 2/2 trauma from increased suctioning    Diagnosis: Sacral osteomyelitis  Assessment and Plan of Treatment: - S/p CT abd/pelvis (10/28): Sacral decubitus ulcer extending to the coccyx with osseous remodeling and pelvic MRI or bone scan to recc to exclude osteomyelitis.  - 10/30 wound care note: Sacral stage 4 pressure injury 4.5cm x 2.5cm x 1.5cm w/ lip of undermining circumferentially greatest 9-12 of 3cm.  - MRI pelvis 10/30 with Osteomyelitis, completed Cefepime for 7 days, Vancomycin d/marli   - 11/10: resumed Cipro 500mg q 12 and Keflex 500mg q 6 until 12/10.  - 11/20: Changed to IV Cipro 400 q12 as recommended by ID pharmacist due to multiple drug interactions when given via PEG  - 11/28 wound care note: Sacral stage 4pressure injury 4.5cm x 2.5cm x 0.5cm, moist granular wound bed   palpable but not exposed bone no blistering, (+) serosanguinous drainage. No odor, erythema, increased warmth, tenderness, induration, fluctuance, nor crepitus.  - 12/3 wound culture ordered - d/c'd as no need for culture, wound improving, no signs of infection  - 12/10 completed cipro and keflex x5 week course  - wound improving  - wound care following    Diagnosis: Rheumatoid arthritis  Assessment and Plan of Treatment: - Continue home Prednisone regimen 5 mg daily   *fibromyalgia  - continue home Gabapentin 100mg daily  - continue home Fentanyl 25mcg Q72H patches  - c/w Lidocaine patch   - Oxycodone 2.5mg q6 hrs PRN (in addition to Tylenol PRN)  - 1/7-1/8 Right forearm and BL shoulder pain - w/FARAZ negative for DVT, imaging negative for acute fracture/dislocation, will continue with pain management as needed with meds above    Diagnosis: Abdominal pain  Assessment and Plan of Treatment: 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   - PEG Site appears macerated --> Multifactorial, possible the PEG has been too tight at home  - Peg loosened by GI at beside, no leakage or further intervention required   - recurrent RLQ abdominal pain and bleeding at the PEG site  - reevaluated by GI -- no bleeding at the PEG site  - 12/1: s/p abdominal ultrasound - limited study   - (12/3): Pt is c/o abd pain, and daughter is concerned   - AM amylase and Lipase all wnl , CT A/P 12/3 - no acute findings  - Continue Simethicone  PEG Leak  - 12/20 silver nitrate applied to PEG site by GI    Diagnosis: Type 2 diabetes mellitus  Assessment and Plan of Treatment: - Insulin regimen as per Endocrine team  - Endocrine recommendations appreciated    Diagnosis: Hallucination  Assessment and Plan of Treatment: - Continue Home Lexapro 10mg daily   - Psych consult appreciated  - Continue Seroquel 12.5mg daily (6PM)    Diagnosis: Need for prophylactic measure  Assessment and Plan of Treatment: - DVT PPx: Lovenox discontinued given daughter's concern for trach and PEG stoma bleeding/oozing.  Also with mild thrombocytopenia.  Will continue SCDs.    Diagnosis: HTN (hypertension)  Assessment and Plan of Treatment: - Echo from 10/17/2020 notable for hyperdynamic L ventricular systolic function, moderately severe LVOT obstruction, and EF 81% (per Wilkinson calculation) vs 77% (per Teichholz calculation)  - Amlodipine 10mg QD

## 2023-11-06 NOTE — DISCHARGE NOTE PROVIDER - NSDCFUADDINST_GEN_ALL_CORE_FT
Sacral wound- improving, continue w/Aquacel dressing QD  Continue turning and positioning w/ offloading assistive devices  Buttock Kanchan BID and prn soiling

## 2023-11-07 LAB
GAS PNL BLDA: SIGNIFICANT CHANGE UP
GAS PNL BLDA: SIGNIFICANT CHANGE UP
GLUCOSE BLDC GLUCOMTR-MCNC: 112 MG/DL — HIGH (ref 70–99)
GLUCOSE BLDC GLUCOMTR-MCNC: 112 MG/DL — HIGH (ref 70–99)
GLUCOSE BLDC GLUCOMTR-MCNC: 123 MG/DL — HIGH (ref 70–99)
GLUCOSE BLDC GLUCOMTR-MCNC: 123 MG/DL — HIGH (ref 70–99)
GLUCOSE BLDC GLUCOMTR-MCNC: 162 MG/DL — HIGH (ref 70–99)
GLUCOSE BLDC GLUCOMTR-MCNC: 162 MG/DL — HIGH (ref 70–99)
GLUCOSE BLDC GLUCOMTR-MCNC: 170 MG/DL — HIGH (ref 70–99)
GLUCOSE BLDC GLUCOMTR-MCNC: 170 MG/DL — HIGH (ref 70–99)
GLUCOSE BLDC GLUCOMTR-MCNC: 231 MG/DL — HIGH (ref 70–99)
GLUCOSE BLDC GLUCOMTR-MCNC: 231 MG/DL — HIGH (ref 70–99)

## 2023-11-07 PROCEDURE — 99233 SBSQ HOSP IP/OBS HIGH 50: CPT

## 2023-11-07 RX ORDER — QUETIAPINE FUMARATE 200 MG/1
12.5 TABLET, FILM COATED ORAL
Refills: 0 | Status: DISCONTINUED | OUTPATIENT
Start: 2023-11-07 | End: 2023-11-08

## 2023-11-07 RX ORDER — QUETIAPINE FUMARATE 200 MG/1
12.5 TABLET, FILM COATED ORAL ONCE
Refills: 0 | Status: COMPLETED | OUTPATIENT
Start: 2023-11-07 | End: 2023-11-07

## 2023-11-07 RX ADMIN — Medication 500 MILLIGRAM(S): at 05:52

## 2023-11-07 RX ADMIN — POLYETHYLENE GLYCOL 3350 17 GRAM(S): 17 POWDER, FOR SOLUTION ORAL at 18:48

## 2023-11-07 RX ADMIN — ZINC SULFATE TAB 220 MG (50 MG ZINC EQUIVALENT) 220 MILLIGRAM(S): 220 (50 ZN) TAB at 11:25

## 2023-11-07 RX ADMIN — Medication 650 MILLIGRAM(S): at 18:50

## 2023-11-07 RX ADMIN — Medication 1 TABLET(S): at 05:52

## 2023-11-07 RX ADMIN — Medication 2 DROP(S): at 11:25

## 2023-11-07 RX ADMIN — INSULIN GLARGINE 14 UNIT(S): 100 INJECTION, SOLUTION SUBCUTANEOUS at 05:53

## 2023-11-07 RX ADMIN — QUETIAPINE FUMARATE 12.5 MILLIGRAM(S): 200 TABLET, FILM COATED ORAL at 18:11

## 2023-11-07 RX ADMIN — Medication 15 MILLILITER(S): at 11:25

## 2023-11-07 RX ADMIN — GABAPENTIN 100 MILLIGRAM(S): 400 CAPSULE ORAL at 22:07

## 2023-11-07 RX ADMIN — Medication 500 MILLIGRAM(S): at 22:06

## 2023-11-07 RX ADMIN — ESCITALOPRAM OXALATE 10 MILLIGRAM(S): 10 TABLET, FILM COATED ORAL at 11:26

## 2023-11-07 RX ADMIN — Medication 500 MILLIGRAM(S): at 05:53

## 2023-11-07 RX ADMIN — Medication 100 MICROGRAM(S): at 05:52

## 2023-11-07 RX ADMIN — Medication 500 MILLIGRAM(S): at 11:26

## 2023-11-07 RX ADMIN — QUETIAPINE FUMARATE 12.5 MILLIGRAM(S): 200 TABLET, FILM COATED ORAL at 11:25

## 2023-11-07 RX ADMIN — LIDOCAINE 1 PATCH: 4 CREAM TOPICAL at 11:27

## 2023-11-07 RX ADMIN — Medication 2 DROP(S): at 00:43

## 2023-11-07 RX ADMIN — Medication 5 MILLIGRAM(S): at 11:26

## 2023-11-07 RX ADMIN — POLYETHYLENE GLYCOL 3350 17 GRAM(S): 17 POWDER, FOR SOLUTION ORAL at 05:53

## 2023-11-07 RX ADMIN — SIMETHICONE 80 MILLIGRAM(S): 80 TABLET, CHEWABLE ORAL at 00:42

## 2023-11-07 RX ADMIN — Medication 500 MILLIGRAM(S): at 18:06

## 2023-11-07 RX ADMIN — Medication 500 MILLIGRAM(S): at 18:07

## 2023-11-07 RX ADMIN — SIMETHICONE 80 MILLIGRAM(S): 80 TABLET, CHEWABLE ORAL at 18:07

## 2023-11-07 RX ADMIN — Medication 500 MILLIGRAM(S): at 00:43

## 2023-11-07 RX ADMIN — LIDOCAINE 1 PATCH: 4 CREAM TOPICAL at 00:36

## 2023-11-07 RX ADMIN — Medication 500 MILLIGRAM(S): at 11:24

## 2023-11-07 RX ADMIN — Medication 4: at 12:49

## 2023-11-07 RX ADMIN — Medication 2 DROP(S): at 05:53

## 2023-11-07 RX ADMIN — LIDOCAINE 1 PATCH: 4 CREAM TOPICAL at 19:00

## 2023-11-07 RX ADMIN — Medication 1 TABLET(S): at 18:06

## 2023-11-07 RX ADMIN — Medication 5 MILLIGRAM(S): at 05:52

## 2023-11-07 RX ADMIN — SIMETHICONE 80 MILLIGRAM(S): 80 TABLET, CHEWABLE ORAL at 22:06

## 2023-11-07 RX ADMIN — Medication 1 TABLET(S): at 11:26

## 2023-11-07 RX ADMIN — ENOXAPARIN SODIUM 40 MILLIGRAM(S): 100 INJECTION SUBCUTANEOUS at 04:51

## 2023-11-07 RX ADMIN — CHLORHEXIDINE GLUCONATE 1 APPLICATION(S): 213 SOLUTION TOPICAL at 05:53

## 2023-11-07 RX ADMIN — CHLORHEXIDINE GLUCONATE 15 MILLILITER(S): 213 SOLUTION TOPICAL at 05:52

## 2023-11-07 RX ADMIN — PANTOPRAZOLE SODIUM 40 MILLIGRAM(S): 20 TABLET, DELAYED RELEASE ORAL at 11:25

## 2023-11-07 RX ADMIN — Medication 2 DROP(S): at 18:07

## 2023-11-07 RX ADMIN — SIMETHICONE 80 MILLIGRAM(S): 80 TABLET, CHEWABLE ORAL at 11:26

## 2023-11-07 RX ADMIN — Medication 2 DROP(S): at 22:06

## 2023-11-07 RX ADMIN — Medication 2: at 00:42

## 2023-11-07 RX ADMIN — SIMETHICONE 80 MILLIGRAM(S): 80 TABLET, CHEWABLE ORAL at 05:52

## 2023-11-07 RX ADMIN — CHLORHEXIDINE GLUCONATE 15 MILLILITER(S): 213 SOLUTION TOPICAL at 18:50

## 2023-11-07 NOTE — PROGRESS NOTE ADULT - SUBJECTIVE AND OBJECTIVE BOX
INTERVAL HPI/OVERNIGHT EVENTS:    tolerating peg feeds    MEDICATIONS  (STANDING):  artificial  tears Solution 2 Drop(s) Both EYES every 6 hours  ascorbic acid 500 milliGRAM(s) Oral daily  calcium carbonate    500 mG (Tums) Chewable 1 Tablet(s) Chew every 12 hours  cephalexin 500 milliGRAM(s) Oral every 6 hours  chlorhexidine 0.12% Liquid 15 milliLiter(s) Oral Mucosa every 12 hours  chlorhexidine 4% Liquid 1 Application(s) Topical <User Schedule>  ciprofloxacin     Tablet 500 milliGRAM(s) Oral every 12 hours  dextrose 50% Injectable 50 milliLiter(s) IV Push once  enoxaparin Injectable 40 milliGRAM(s) SubCutaneous every 24 hours  escitalopram 10 milliGRAM(s) Oral daily  fentaNYL   Patch  25 MICROgram(s)/Hr 1 Patch Transdermal every 72 hours  gabapentin Solution 100 milliGRAM(s) Enteral Tube at bedtime  insulin glargine Injectable (LANTUS) 14 Unit(s) SubCutaneous every morning  insulin lispro (ADMELOG) corrective regimen sliding scale   SubCutaneous every 6 hours  lactobacillus acidophilus 1 Tablet(s) Oral daily  levothyroxine 100 MICROGram(s) Enteral Tube <User Schedule>  lidocaine   4% Patch 1 Patch Transdermal daily  multivitamin/minerals/iron Oral Solution (CENTRUM) 15 milliLiter(s) Oral daily  oxybutynin 5 milliGRAM(s) Oral daily  pantoprazole   Suspension 40 milliGRAM(s) Enteral Tube daily  polyethylene glycol 3350 17 Gram(s) Oral two times a day  predniSONE   Tablet 5 milliGRAM(s) Oral daily  QUEtiapine 12.5 milliGRAM(s) Oral <User Schedule>  simethicone 80 milliGRAM(s) Chew four times a day  zinc sulfate 220 milliGRAM(s) Oral daily    MEDICATIONS  (PRN):  acetaminophen   Oral Liquid .. 650 milliGRAM(s) Oral every 6 hours PRN Temp greater or equal to 38C (100.4F), mild Pain (1-3)  albuterol/ipratropium for Nebulization 3 milliLiter(s) Nebulizer every 6 hours PRN Bronchospasm  diphenhydrAMINE Elixir 25 milliGRAM(s) Oral every 6 hours PRN Rash and/or Itching  oxycodone    5 mG/acetaminophen 325 mG 1 Tablet(s) Oral every 6 hours PRN Moderate Pain (4 - 6)  sodium chloride 0.65% Nasal 2 Spray(s) Both Nostrils two times a day PRN Nasal Congestion      Allergies    penicillin (Unknown)  heparin (Unknown)  Tagamet (Unknown)  pineapple (Unknown)  walnut (Unknown)  Pecan, Filbert, Hazelnut (Unknown)  Lyrica (Unknown)    Intolerances        Review of Systems: *unobtainable    General:  No wt loss, fevers, chills, night sweats, fatigue   Eyes:  Good vision, no reported pain  ENT:  No sore throat, pain, runny nose, dysphagia  CV:  No pain, palpitations, hypo/hypertension  Resp:  No dyspnea, cough, tachypnea, wheezing  GI:  No pain, No nausea, No vomiting, No diarrhea, No constipation, No weight loss, No fever, No pruritis, No rectal bleeding, No melena, No dysphagia  :  No pain, bleeding, incontinence, nocturia  Muscle:  No pain, weakness  Neuro:  No weakness, tingling, memory problems  Psych:  No fatigue, insomnia, mood problems, depression  Endocrine:  No polyuria, polydypsia, cold/heat intolerance  Heme:  No petechiae, ecchymosis, easy bruisability  Skin:  No rash, tattoos, scars, edema      Vital Signs Last 24 Hrs  T(C): 36.2 (07 Nov 2023 04:40), Max: 37.9 (06 Nov 2023 21:45)  T(F): 97.2 (07 Nov 2023 04:40), Max: 100.2 (06 Nov 2023 21:45)  HR: 66 (07 Nov 2023 09:41) (60 - 75)  BP: 115/53 (07 Nov 2023 04:40) (115/53 - 151/62)  BP(mean): --  RR: 12 (07 Nov 2023 09:40) (12 - 20)  SpO2: 100% (07 Nov 2023 09:41) (99% - 100%)    Parameters below as of 07 Nov 2023 09:40  Patient On (Oxygen Delivery Method): ventilator        PHYSICAL EXAM:    Constitutional: NAD  HEENT: EOMI, throat clear  Neck: No LAD, supple  Respiratory: CTA and P  Cardiovascular: S1 and S2, RRR, no M  Gastrointestinal: BS+, soft, NT/ND, neg HSM, +peg c/d/i  Extremities: No peripheral edema, neg clubbing, cyanosis  Vascular: 2+ peripheral pulses  Neurological: A/O   Psychiatric: Normal mood, normal affect  Skin: No rashes      LABS:                        9.5    7.55  )-----------( 107      ( 06 Nov 2023 10:17 )             30.9     11-06    137  |  102  |  20  ----------------------------<  156<H>  4.3   |  22  |  <0.30<L>    Ca    9.2      06 Nov 2023 10:17  Phos  4.0     11-06  Mg     2.0     11-06        Urinalysis Basic - ( 06 Nov 2023 10:17 )    Color: x / Appearance: x / SG: x / pH: x  Gluc: 156 mg/dL / Ketone: x  / Bili: x / Urobili: x   Blood: x / Protein: x / Nitrite: x   Leuk Esterase: x / RBC: x / WBC x   Sq Epi: x / Non Sq Epi: x / Bacteria: x        RADIOLOGY & ADDITIONAL TESTS:

## 2023-11-07 NOTE — PROGRESS NOTE ADULT - PROBLEM SELECTOR PLAN 8
Continue Home Lexapro 10mg daily   Continue Seroquel 12.5mg hs   Family requesting Psych consult during admission Continue Home Lexapro 10mg daily   Psych consult apreciated  Continue Seroquel 12.5mg Q12 0600, 1800

## 2023-11-07 NOTE — PROGRESS NOTE ADULT - NS ATTEND AMEND GEN_ALL_CORE FT
73F w/ DM, HTN, HLD, anemia, hypothyroidism, RA, fibromyalgia, remote cerebral aneursym repair, hypercapnic respiratory failure s/p tracheostomy/PEG, bedbound, sacral pressure ulcer. Admitted w/ bleeding from tracheostomy and PEG w/ associated abdominal pain, recent hx of auditory/visual hallucinations. Since admission, no further bleeding noted from either trach or PEG. Imaging showed mild thickening along PEG tube tract and sacral ulcer extending to coccyx suggestive of possible OM.     # Osteomyelitis  # Chronic hypoxemic RF  # oropharyngeal dysphagia  # acute on chronic pain  # Trach/Peg bleeding  # Tracheitis  # Hx of hallucaination, anxiety  - MRI showing Osteo, on abx per ID, c/w wound care, no plans for surgery. Will need prolonged course of abx  - C/W vent, adjust as needed, will need vent on discharge  - Pending repeat culture, possible recurrent tracheitis. Blood streaked secretion evaluated by ENT, s/p trach exchange. No active bleeding likely from suction trauma.   - family requested S/S eval but unable to given dependence on vent  - C/W pain control, multifactorial 2/2 to fibromyalgia as well as now osteo with pressure ulcer  - Peg irritation seen by GI, no revs for further interventions. H/H stable  - Will up titrate Seroquel, check QTC, seen by  recommend Seroquel for delirium  - DVT ppx- lovenox  - Dispo- full code. Pending preparation to discharge home on vent.

## 2023-11-07 NOTE — PROGRESS NOTE ADULT - PROBLEM SELECTOR PLAN 6
-Continue home Prednisone regimen  *fibromyalgia  -continue home Gabapentin 100mg daily  -continue home Fentanyl patches  - added Lidocaine patch   - added Percocet q6 hrs (in addition to Tylenol PRN) -Continue home Prednisone regimen  *fibromyalgia  -continue home Gabapentin 100mg daily  -continue home Fentanyl patches  - added Lidocaine patch   - added Percocet q6 hrs PRN (in addition to Tylenol PRN)

## 2023-11-07 NOTE — PROGRESS NOTE ADULT - PROBLEM SELECTOR PLAN 2
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   - S/p CT abd/pelvis (10/28): Sacral decubitus ulcer extending to the coccyx with osseous remodeling and pelvic MRI or bone scan to recc to exclude osteomyelitis.  - MRI pelvis 10/30 with Osteomyelitis, c/w current Cefepime for 7 days, Vancomycin d/marli   - Elevated, ESR 85   - Elevated,    - Wound care on board  - ID consult placed 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   - S/p CT abd/pelvis (10/28): Sacral decubitus ulcer extending to the coccyx with osseous remodeling and pelvic MRI or bone scan to recc to exclude osteomyelitis.  - MRI pelvis 10/30 with Osteomyelitis, c/w current Cefepime for 7 days, Vancomycin d/marli   - Elevated, ESR 85   - Elevated,    - Wound care on board  - Continue Cipro 500mg q 12 and Keflex 500mg q 6 until 12/10.

## 2023-11-07 NOTE — PROGRESS NOTE ADULT - PROBLEM SELECTOR PLAN 4
-S/p CT Abd/Pelvis: No emergent findings or active bleed; 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; 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   -Bowel regimen  -Continue Simthicone QID -S/p CT Abd/Pelvis: No emergent findings or active bleed; 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; 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   -Bowel regimen  - PEG Site appears macerated. Multifactorial, possible the PEG has been too tight at home.    -Continue Simthicone QID

## 2023-11-07 NOTE — PROGRESS NOTE ADULT - PROBLEM SELECTOR PLAN 1
ID consulted  On empiric Cefipime  MRI confirms acute osteomyelitis of sacrum.  DC planning as per Pulm primary team

## 2023-11-07 NOTE — PROGRESS NOTE ADULT - PROBLEM SELECTOR PLAN 3
Chronic Trach/ Vent dependant 2/2 Hypercapnic Resp Failure since 10/2022   -S/p Trach # 8 Cuffed Flex Shiley; trach change 8/18, PEG change 8/14 per records   - Continue Home vent settings: (300 TV/ RR 12/5 Peep/ Fio2 30%)  - Tolerates intermittent PSV 15/5 during day/ Vent HS  - Airway Clearance: Duoneb, Hypersal, Chest PT, PRN suctioning   - Maintain 02 sat > 92%    Tracheal Bleeding (Intermittent):   -S/p CT Angio neck: No evidence of active bleeding around tracheostomy site. No hematoma.  No collection   - Advise RN staff to use Red rubber trach catheters to avoid suction trauma   - Seen by ENT; Kath and b/l bronchi visualized without any trauma. small amount of blood tinged secretions resting at beginning of right bronchus not actively bleeding. Chronic Trach/ Vent dependant 2/2 Hypercapnic Resp Failure since 10/2022   -S/p Trach # 8 Cuffed Flex Shiley; trach change 8/18, PEG change 8/14 per records   - Continue Home vent settings: (300 TV/ RR 12/5 Peep/ Fio2 30%)  - Tolerates intermittent PSV 15/5 during day/ Vent HS  - Airway Clearance: Duoneb, Hypersal, Chest PT, PRN suctioning   - Maintain 02 sat > 92%    Tracheal Bleeding (Intermittent):   -S/p CT Angio neck: No evidence of active bleeding around tracheostomy site. No hematoma.  No collection   - Advise RN staff to use Red rubber trach catheters to avoid suction trauma   - Seen by ENT; Kath and b/l bronchi visualized without any trauma. small amount of blood tinged secretions resting at beginning of right bronchus not actively bleeding.  - 11/3 trach changed to #9 Portex

## 2023-11-07 NOTE — PROGRESS NOTE ADULT - PROBLEM SELECTOR PLAN 7
- Home Levemir 14 units in morning held on admission as noted w/ hypoglycemia   -Continue home regimen with sliding scale

## 2023-11-07 NOTE — PROGRESS NOTE ADULT - SUBJECTIVE AND OBJECTIVE BOX
Patient is a 73y old  Female who presents with a chief complaint of     HPI:  11/7/2023    Patient is at baseline mental state. On [facility] vent. Afebrile, no distress noted.    PAST MEDICAL & SURGICAL HISTORY:  Diabetes      Rheumatoid arthritis      Fibromyalgia      Hypothyroid      Hypertension      H/O tracheostomy      PEG (percutaneous endoscopic gastrostomy) status          ALLERY AND IMMUNOLOGIC: No hives or eczema    Allergies    penicillin (Unknown)  heparin (Unknown)  Tagamet (Unknown)  pineapple (Unknown)  walnut (Unknown)  Pecan, Filbert, Hazelnut (Unknown)  Lyrica (Unknown)    Intolerances        Social History:     FAMILY HISTORY:      MEDICATIONS  (STANDING):  acetaminophen   IVPB .. 1000 milliGRAM(s) IV Intermittent once  acetylcysteine 10%  Inhalation 4 milliLiter(s) Inhalation once  albuterol/ipratropium for Nebulization 3 milliLiter(s) Nebulizer every 6 hours  artificial  tears Solution 2 Drop(s) Both EYES every 6 hours  aztreonam  IVPB      aztreonam  IVPB 1000 milliGRAM(s) IV Intermittent every 8 hours  calcium carbonate    500 mG (Tums) Chewable 1 Tablet(s) Chew every 12 hours  chlorhexidine 0.12% Liquid 15 milliLiter(s) Oral Mucosa every 12 hours  chlorhexidine 4% Liquid 1 Application(s) Topical <User Schedule>  dextrose 50% Injectable 50 milliLiter(s) IV Push once  escitalopram 10 milliGRAM(s) Oral daily  fentaNYL   Patch  25 MICROgram(s)/Hr 1 Patch Transdermal every 72 hours  gabapentin Solution 100 milliGRAM(s) Enteral Tube at bedtime  insulin lispro (ADMELOG) corrective regimen sliding scale   SubCutaneous every 6 hours  levothyroxine 100 MICROGram(s) Enteral Tube daily  multivitamin/minerals/iron Oral Solution (CENTRUM) 15 milliLiter(s) Oral daily  pantoprazole   Suspension 40 milliGRAM(s) Enteral Tube daily  predniSONE   Tablet 5 milliGRAM(s) Oral daily  QUEtiapine 12.5 milliGRAM(s) Oral at bedtime  simethicone 80 milliGRAM(s) Chew four times a day  sodium chloride 3%  Inhalation 4 milliLiter(s) Inhalation every 6 hours  vancomycin  IVPB 1000 milliGRAM(s) IV Intermittent every 12 hours  zinc sulfate 220 milliGRAM(s) Oral daily    MEDICATIONS  (PRN):  sodium chloride 0.65% Nasal 2 Spray(s) Both Nostrils two times a day PRN Nasal Congestion          I&O's Summary    27 Oct 2023 07:01  -  28 Oct 2023 07:00  --------------------------------------------------------  IN: 200 mL / OUT: 400 mL / NET: -200 mL    28 Oct 2023 07:01  -  28 Oct 2023 19:00  --------------------------------------------------------  IN: 710 mL / OUT: 0 mL / NET: 710 mL        PHYSICAL EXAM:  Vital Signs Last 24 Hrs  T(C): 36.3 (07 Nov 2023 12:18), Max: 37.7 (07 Nov 2023 00:16)  T(F): 97.4 (07 Nov 2023 12:18), Max: 99.8 (07 Nov 2023 00:16)  HR: 75 (07 Nov 2023 17:29) (60 - 75)  BP: 132/66 (07 Nov 2023 17:29) (115/53 - 152/63)  BP(mean): --  RR: 17 (07 Nov 2023 15:21) (12 - 20)  SpO2: 100% (07 Nov 2023 17:29) (99% - 100%)    Parameters below as of 07 Nov 2023 17:29  Patient On (Oxygen Delivery Method): ventilator          GENERAL: NAD, well-developed  HEAD:  Atraumatic, Normocephalic  EYES: EOMI, PERRLA, conjunctiva and sclera clear  NECK: Supple, No JVD. Trach in place, connected to mech vent  CHEST/LUNG: Clear to auscultation bilaterally; No wheeze  HEART: Regular rate and rhythm; No murmurs, rubs, or gallops  ABDOMEN: Soft, tender, Nondistended; Bowel sounds present PEG in place  EXTREMITIES:  2+ Peripheral Pulses, No clubbing, cyanosis, or edema  PSYCH: AAOx3  NEUROLOGY: Bed bound, functionally quadriplegic  SKIN: No rashes or lesions    LABS:                        8.5    6.07  )-----------( 117      ( 28 Oct 2023 07:19 )             28.4     10-28    135  |  100  |  16  ----------------------------<  77  3.3<L>   |  27  |  <0.30<L>    Ca    8.3<L>      28 Oct 2023 07:19  Phos  3.2     10-28  Mg     1.9     10-28    TPro  6.7  /  Alb  3.0<L>  /  TBili  0.2  /  DBili  x   /  AST  21  /  ALT  23  /  AlkPhos  47  10-28    PT/INR - ( 27 Oct 2023 14:39 )   PT: 12.3 sec;   INR: 1.12 ratio         PTT - ( 27 Oct 2023 14:39 )  PTT:30.8 sec      Urinalysis Basic - ( 28 Oct 2023 07:19 )    Color: x / Appearance: x / SG: x / pH: x  Gluc: 77 mg/dL / Ketone: x  / Bili: x / Urobili: x   Blood: x / Protein: x / Nitrite: x   Leuk Esterase: x / RBC: x / WBC x   Sq Epi: x / Non Sq Epi: x / Bacteria: x        RADIOLOGY & ADDITIONAL TESTS:    Imaging Personally Reviewed:    Consultant(s) Notes Reviewed:      Care Discussed with Consultants/Other Providers:

## 2023-11-07 NOTE — PROGRESS NOTE ADULT - SUBJECTIVE AND OBJECTIVE BOX
Patient is a 73y old  Female who presents with a chief complaint of 72 y/o F with PMHx of T2DM, HTN, HLD, anemia, hypothyroidism, RA, fibromyalgia, remote cerebral aneurysm repair, acute hypercapnic respiratory failure now with tracheostomy, PEG tube, and bedbound (trach change 8/18, PEG change 8/14), sacral pressure ulcer, BIBEMS from home for 6 day hx of bleeding from the tracheostomy and PEG tube with associated abdominal pain, recent history of auditory and visual hallucinations, and with chronic sacral decubitus ulcer.    (29 Oct 2023 14:01)    HPI:  72 y/o F with PMHx of T2DM, HTN, HLD, anemia, hypothyroidism, RA, fibromyalgia, remote cerebral aneurysm repair, acute hypercapnic respiratory failure now with tracheostomy, PEG tube, and bedbound (trach change 8/18, PEG change 8/14), sacral pressure ulcer, BIBEMS from home for 6 day hx of bleeding from the tracheostomy and PEG tube with associated abdominal pain. Patient still having regular bowel movements, last one occurred prior to ED arrival. Was seen outpatient on 10/26 by critical care Dr. Zaki Gottlieb and apparently had cultures sent at that time. Patient also noted to have chronic sacral decubitous ulcer. Family endorsed one episode of fever, though was not febrile on evaluation. Daughter noted patient was recently experiencing auditory and visual hallucinations (seeing animals that were not there & hearing a "bomb" going off outside her home), though patient not actively hallucinating on evaluation.  ED course notable for CT neck imaging showing no evidence of active bleeding around the tracheostomy site, no hematomas, and no drainable collection around the tracheostomy site. CT abdomen/pelvis notable for gastrostomy tube localized to the stomach with mild thickening noted along the tract; a sacral decubitus ulcer extending to the coccyx with osseous remodeling, possibly representing osteomyelitis; and bibasilar patchy opacities with volume loss in the lungs, representing atelectasis vs infection. Patient admitted for respiratory support, wound care of tracheostomy and PEG, and management of presumed sacral osteomyelitis.   (28 Oct 2023 04:18)    Interval Events:    REVIEW OF SYSTEMS:  [ ] Positive  [ ] All other systems negative  [ ] Unable to assess ROS because ________    Vital Signs Last 24 Hrs  T(C): 36.2 (11-07-23 @ 04:40), Max: 37.9 (11-06-23 @ 21:45)  T(F): 97.2 (11-07-23 @ 04:40), Max: 100.2 (11-06-23 @ 21:45)  HR: 60 (11-07-23 @ 04:40) (60 - 75)  BP: 115/53 (11-07-23 @ 04:40) (115/53 - 151/62)  RR: 20 (11-07-23 @ 04:40) (20 - 20)  SpO2: 99% (11-07-23 @ 04:40) (99% - 100%)    PHYSICAL EXAM:  HEENT:   [ ]Tracheostomy:  [ ]Pupils equal  [ ]No oral lesions  [ ]Abnormal    SKIN  [ ] No Rash  [ ] Abnormal  [ ] pressure    CARDIAC  [ ]Regular  [ ]Abnormal    PULMONARY  [ ]Bilateral Clear Breath Sounds  [ ]Normal Excursion  [ ]Abnormal    GI  [ ]PEG      [ ] +BS		              [ ]Soft, nondistended, nontender	  [ ]Abnormal    MUSCULOSKELETAL                                   [ ]Bedbound                 [ ]Abnormal    [ ]Ambulatory/OOB to chair                           EXTREMITIES                                         [ ]Normal  [ ]Edema                           NEUROLOGIC  [ ] Normal, non focal  [ ] Focal findings:    PSYCHIATRIC  [ ]Alert and appropriate  [ ] Sedated	 [ ]Agitated    :  Mcbride: [ ] Yes, if yes: Date of Placement:                   [  ] No    LINES: Central Lines [ ] Yes, if yes: Date of Placement                                     [  ] No    HOSPITAL MEDICATIONS:  MEDICATIONS  (STANDING):  artificial  tears Solution 2 Drop(s) Both EYES every 6 hours  ascorbic acid 500 milliGRAM(s) Oral daily  calcium carbonate    500 mG (Tums) Chewable 1 Tablet(s) Chew every 12 hours  cephalexin 500 milliGRAM(s) Oral every 6 hours  chlorhexidine 0.12% Liquid 15 milliLiter(s) Oral Mucosa every 12 hours  chlorhexidine 4% Liquid 1 Application(s) Topical <User Schedule>  ciprofloxacin     Tablet 500 milliGRAM(s) Oral every 12 hours  dextrose 50% Injectable 50 milliLiter(s) IV Push once  enoxaparin Injectable 40 milliGRAM(s) SubCutaneous every 24 hours  escitalopram 10 milliGRAM(s) Oral daily  fentaNYL   Patch  25 MICROgram(s)/Hr 1 Patch Transdermal every 72 hours  gabapentin Solution 100 milliGRAM(s) Enteral Tube at bedtime  insulin glargine Injectable (LANTUS) 14 Unit(s) SubCutaneous every morning  insulin lispro (ADMELOG) corrective regimen sliding scale   SubCutaneous every 6 hours  lactobacillus acidophilus 1 Tablet(s) Oral daily  levothyroxine 100 MICROGram(s) Enteral Tube <User Schedule>  lidocaine   4% Patch 1 Patch Transdermal daily  multivitamin/minerals/iron Oral Solution (CENTRUM) 15 milliLiter(s) Oral daily  oxybutynin 5 milliGRAM(s) Oral daily  pantoprazole   Suspension 40 milliGRAM(s) Enteral Tube daily  polyethylene glycol 3350 17 Gram(s) Oral two times a day  predniSONE   Tablet 5 milliGRAM(s) Oral daily  QUEtiapine 12.5 milliGRAM(s) Oral at bedtime  simethicone 80 milliGRAM(s) Chew four times a day  zinc sulfate 220 milliGRAM(s) Oral daily    MEDICATIONS  (PRN):  acetaminophen   Oral Liquid .. 650 milliGRAM(s) Oral every 6 hours PRN Temp greater or equal to 38C (100.4F), mild Pain (1-3)  albuterol/ipratropium for Nebulization 3 milliLiter(s) Nebulizer every 6 hours PRN Bronchospasm  diphenhydrAMINE Elixir 25 milliGRAM(s) Oral every 6 hours PRN Rash and/or Itching  oxycodone    5 mG/acetaminophen 325 mG 1 Tablet(s) Oral every 6 hours PRN Moderate Pain (4 - 6)  sodium chloride 0.65% Nasal 2 Spray(s) Both Nostrils two times a day PRN Nasal Congestion      LABS:                        9.5    7.55  )-----------( 107      ( 06 Nov 2023 10:17 )             30.9     11-06    137  |  102  |  20  ----------------------------<  156<H>  4.3   |  22  |  <0.30<L>    Ca    9.2      06 Nov 2023 10:17  Phos  4.0     11-06  Mg     2.0     11-06    Urinalysis Basic - ( 06 Nov 2023 10:17 )    Color: x / Appearance: x / SG: x / pH: x  Gluc: 156 mg/dL / Ketone: x  / Bili: x / Urobili: x   Blood: x / Protein: x / Nitrite: x   Leuk Esterase: x / RBC: x / WBC x   Sq Epi: x / Non Sq Epi: x / Bacteria: x      Mode: AC/ CMV (Assist Control/ Continuous Mandatory Ventilation)  RR (machine): 12  TV (machine): 350  FiO2: 30  PEEP: 5  ITime: 1  MAP: 8  PIP: 26   Patient is a 73y old  Female who presents with a chief complaint of 74 y/o F with PMHx of T2DM, HTN, HLD, anemia, hypothyroidism, RA, fibromyalgia, remote cerebral aneurysm repair, acute hypercapnic respiratory failure now with tracheostomy, PEG tube, and bedbound (trach change 8/18, PEG change 8/14), sacral pressure ulcer, BIBEMS from home for 6 day hx of bleeding from the tracheostomy and PEG tube with associated abdominal pain, recent history of auditory and visual hallucinations, and with chronic sacral decubitus ulcer.    (29 Oct 2023 14:01)    HPI:  74 y/o F with PMHx of T2DM, HTN, HLD, anemia, hypothyroidism, RA, fibromyalgia, remote cerebral aneurysm repair, acute hypercapnic respiratory failure now with tracheostomy, PEG tube, and bedbound (trach change 8/18, PEG change 8/14), sacral pressure ulcer, BIBEMS from home for 6 day hx of bleeding from the tracheostomy and PEG tube with associated abdominal pain. Patient still having regular bowel movements, last one occurred prior to ED arrival. Was seen outpatient on 10/26 by critical care Dr. Zaki Gottlieb and apparently had cultures sent at that time. Patient also noted to have chronic sacral decubitous ulcer. Family endorsed one episode of fever, though was not febrile on evaluation. Daughter noted patient was recently experiencing auditory and visual hallucinations (seeing animals that were not there & hearing a "bomb" going off outside her home), though patient not actively hallucinating on evaluation.  ED course notable for CT neck imaging showing no evidence of active bleeding around the tracheostomy site, no hematomas, and no drainable collection around the tracheostomy site. CT abdomen/pelvis notable for gastrostomy tube localized to the stomach with mild thickening noted along the tract; a sacral decubitus ulcer extending to the coccyx with osseous remodeling, possibly representing osteomyelitis; and bibasilar patchy opacities with volume loss in the lungs, representing atelectasis vs infection. Patient admitted for respiratory support, wound care of tracheostomy and PEG, and management of presumed sacral osteomyelitis.   (28 Oct 2023 04:18)    Interval Events:    REVIEW OF SYSTEMS:  [x ] Positive; complained that she had too much sputum  [ ] All other systems negative  [ ] Unable to assess ROS because ________    Vital Signs Last 24 Hrs  T(C): 36.2 (11-07-23 @ 04:40), Max: 37.9 (11-06-23 @ 21:45)  T(F): 97.2 (11-07-23 @ 04:40), Max: 100.2 (11-06-23 @ 21:45)  HR: 60 (11-07-23 @ 04:40) (60 - 75)  BP: 115/53 (11-07-23 @ 04:40) (115/53 - 151/62)  RR: 20 (11-07-23 @ 04:40) (20 - 20)  SpO2: 99% (11-07-23 @ 04:40) (99% - 100%)    PHYSICAL EXAM:  HEENT:   [ X ]Tracheostomy: #9 Portex  [ X ]Pupils equal  [ ]No oral lesions  [ ]Abnormal    SKIN  [ X ] No Rash  [ X ] Abnormal - sacral wound  [ ] pressure    CARDIAC  [ X ]Regular  [ ]Abnormal    PULMONARY  [ ] Bilateral Clear Breath Sounds  [ ]Normal Excursion  [ X ] Abnormal - R sided rhonchi, diminished BS at b/l bases    GI  [X] PEG      [ X] +BS		              [X] Soft, nondistended, nontender	  [ ]Abnormal    MUSCULOSKELETAL                                   [X] Bedbound                 [ ]Abnormal    [ ]Ambulatory/OOB to chair                           EXTREMITIES                                         [X]Normal  [ ]Edema                           NEUROLOGIC  [X] Normal, non focal  [ ] Focal findings:    PSYCHIATRIC  [X] Alert and appropriate  [ ] Sedated	 [ ]Agitated    :  Mcbride: [ ] Yes, if yes: Date of Placement:                   [X] No    LINES: Central Lines [ ] Yes, if yes: Date of Placement                                     [X] No    HOSPITAL MEDICATIONS:  MEDICATIONS  (STANDING):  artificial  tears Solution 2 Drop(s) Both EYES every 6 hours  ascorbic acid 500 milliGRAM(s) Oral daily  calcium carbonate    500 mG (Tums) Chewable 1 Tablet(s) Chew every 12 hours  cephalexin 500 milliGRAM(s) Oral every 6 hours  chlorhexidine 0.12% Liquid 15 milliLiter(s) Oral Mucosa every 12 hours  chlorhexidine 4% Liquid 1 Application(s) Topical <User Schedule>  ciprofloxacin     Tablet 500 milliGRAM(s) Oral every 12 hours  dextrose 50% Injectable 50 milliLiter(s) IV Push once  enoxaparin Injectable 40 milliGRAM(s) SubCutaneous every 24 hours  escitalopram 10 milliGRAM(s) Oral daily  fentaNYL   Patch  25 MICROgram(s)/Hr 1 Patch Transdermal every 72 hours  gabapentin Solution 100 milliGRAM(s) Enteral Tube at bedtime  insulin glargine Injectable (LANTUS) 14 Unit(s) SubCutaneous every morning  insulin lispro (ADMELOG) corrective regimen sliding scale   SubCutaneous every 6 hours  lactobacillus acidophilus 1 Tablet(s) Oral daily  levothyroxine 100 MICROGram(s) Enteral Tube <User Schedule>  lidocaine   4% Patch 1 Patch Transdermal daily  multivitamin/minerals/iron Oral Solution (CENTRUM) 15 milliLiter(s) Oral daily  oxybutynin 5 milliGRAM(s) Oral daily  pantoprazole   Suspension 40 milliGRAM(s) Enteral Tube daily  polyethylene glycol 3350 17 Gram(s) Oral two times a day  predniSONE   Tablet 5 milliGRAM(s) Oral daily  QUEtiapine 12.5 milliGRAM(s) Oral at bedtime  simethicone 80 milliGRAM(s) Chew four times a day  zinc sulfate 220 milliGRAM(s) Oral daily    MEDICATIONS  (PRN):  acetaminophen   Oral Liquid .. 650 milliGRAM(s) Oral every 6 hours PRN Temp greater or equal to 38C (100.4F), mild Pain (1-3)  albuterol/ipratropium for Nebulization 3 milliLiter(s) Nebulizer every 6 hours PRN Bronchospasm  diphenhydrAMINE Elixir 25 milliGRAM(s) Oral every 6 hours PRN Rash and/or Itching  oxycodone    5 mG/acetaminophen 325 mG 1 Tablet(s) Oral every 6 hours PRN Moderate Pain (4 - 6)  sodium chloride 0.65% Nasal 2 Spray(s) Both Nostrils two times a day PRN Nasal Congestion      LABS:                        9.5    7.55  )-----------( 107      ( 06 Nov 2023 10:17 )             30.9     11-06    137  |  102  |  20  ----------------------------<  156<H>  4.3   |  22  |  <0.30<L>    Ca    9.2      06 Nov 2023 10:17  Phos  4.0     11-06  Mg     2.0     11-06    Urinalysis Basic - ( 06 Nov 2023 10:17 )    Color: x / Appearance: x / SG: x / pH: x  Gluc: 156 mg/dL / Ketone: x  / Bili: x / Urobili: x   Blood: x / Protein: x / Nitrite: x   Leuk Esterase: x / RBC: x / WBC x   Sq Epi: x / Non Sq Epi: x / Bacteria: x      Mode: AC/ CMV (Assist Control/ Continuous Mandatory Ventilation)  RR (machine): 12  TV (machine): 300  FiO2: 30  PEEP: 5  ITime: 1  MAP: 8  PIP: 26

## 2023-11-07 NOTE — CHART NOTE - NSCHARTNOTEFT_GEN_A_CORE
Was called by ACP informing daughter would like to speak with the attending. Called twice and left VM. Will await call back.

## 2023-11-08 LAB
GLUCOSE BLDC GLUCOMTR-MCNC: 112 MG/DL — HIGH (ref 70–99)
GLUCOSE BLDC GLUCOMTR-MCNC: 112 MG/DL — HIGH (ref 70–99)
GLUCOSE BLDC GLUCOMTR-MCNC: 148 MG/DL — HIGH (ref 70–99)
GLUCOSE BLDC GLUCOMTR-MCNC: 148 MG/DL — HIGH (ref 70–99)
GLUCOSE BLDC GLUCOMTR-MCNC: 182 MG/DL — HIGH (ref 70–99)
GLUCOSE BLDC GLUCOMTR-MCNC: 182 MG/DL — HIGH (ref 70–99)
GLUCOSE BLDC GLUCOMTR-MCNC: 189 MG/DL — HIGH (ref 70–99)
GLUCOSE BLDC GLUCOMTR-MCNC: 189 MG/DL — HIGH (ref 70–99)

## 2023-11-08 PROCEDURE — 99233 SBSQ HOSP IP/OBS HIGH 50: CPT

## 2023-11-08 RX ORDER — OXYCODONE HYDROCHLORIDE 5 MG/1
5 TABLET ORAL EVERY 6 HOURS
Refills: 0 | Status: DISCONTINUED | OUTPATIENT
Start: 2023-11-08 | End: 2023-11-09

## 2023-11-08 RX ORDER — CEFEPIME 1 G/1
INJECTION, POWDER, FOR SOLUTION INTRAMUSCULAR; INTRAVENOUS
Refills: 0 | Status: DISCONTINUED | OUTPATIENT
Start: 2023-11-08 | End: 2023-11-16

## 2023-11-08 RX ORDER — QUETIAPINE FUMARATE 200 MG/1
12.5 TABLET, FILM COATED ORAL
Refills: 0 | Status: DISCONTINUED | OUTPATIENT
Start: 2023-11-08 | End: 2023-11-09

## 2023-11-08 RX ORDER — CEFEPIME 1 G/1
2000 INJECTION, POWDER, FOR SOLUTION INTRAMUSCULAR; INTRAVENOUS ONCE
Refills: 0 | Status: COMPLETED | OUTPATIENT
Start: 2023-11-08 | End: 2023-11-08

## 2023-11-08 RX ORDER — CEFEPIME 1 G/1
2000 INJECTION, POWDER, FOR SOLUTION INTRAMUSCULAR; INTRAVENOUS EVERY 8 HOURS
Refills: 0 | Status: DISCONTINUED | OUTPATIENT
Start: 2023-11-08 | End: 2023-11-16

## 2023-11-08 RX ADMIN — SIMETHICONE 80 MILLIGRAM(S): 80 TABLET, CHEWABLE ORAL at 17:18

## 2023-11-08 RX ADMIN — Medication 15 MILLILITER(S): at 12:15

## 2023-11-08 RX ADMIN — Medication 1 TABLET(S): at 17:18

## 2023-11-08 RX ADMIN — CHLORHEXIDINE GLUCONATE 1 APPLICATION(S): 213 SOLUTION TOPICAL at 06:28

## 2023-11-08 RX ADMIN — QUETIAPINE FUMARATE 12.5 MILLIGRAM(S): 200 TABLET, FILM COATED ORAL at 06:28

## 2023-11-08 RX ADMIN — Medication 1 TABLET(S): at 06:21

## 2023-11-08 RX ADMIN — Medication 500 MILLIGRAM(S): at 12:14

## 2023-11-08 RX ADMIN — Medication 1 TABLET(S): at 12:14

## 2023-11-08 RX ADMIN — Medication 500 MILLIGRAM(S): at 06:21

## 2023-11-08 RX ADMIN — Medication 2: at 12:16

## 2023-11-08 RX ADMIN — ZINC SULFATE TAB 220 MG (50 MG ZINC EQUIVALENT) 220 MILLIGRAM(S): 220 (50 ZN) TAB at 12:14

## 2023-11-08 RX ADMIN — QUETIAPINE FUMARATE 12.5 MILLIGRAM(S): 200 TABLET, FILM COATED ORAL at 16:04

## 2023-11-08 RX ADMIN — ESCITALOPRAM OXALATE 10 MILLIGRAM(S): 10 TABLET, FILM COATED ORAL at 12:14

## 2023-11-08 RX ADMIN — Medication 100 MICROGRAM(S): at 06:28

## 2023-11-08 RX ADMIN — PANTOPRAZOLE SODIUM 40 MILLIGRAM(S): 20 TABLET, DELAYED RELEASE ORAL at 12:14

## 2023-11-08 RX ADMIN — INSULIN GLARGINE 14 UNIT(S): 100 INJECTION, SOLUTION SUBCUTANEOUS at 06:23

## 2023-11-08 RX ADMIN — CEFEPIME 100 MILLIGRAM(S): 1 INJECTION, POWDER, FOR SOLUTION INTRAMUSCULAR; INTRAVENOUS at 00:03

## 2023-11-08 RX ADMIN — Medication 2 DROP(S): at 17:19

## 2023-11-08 RX ADMIN — ENOXAPARIN SODIUM 40 MILLIGRAM(S): 100 INJECTION SUBCUTANEOUS at 06:21

## 2023-11-08 RX ADMIN — LIDOCAINE 1 PATCH: 4 CREAM TOPICAL at 12:15

## 2023-11-08 RX ADMIN — SIMETHICONE 80 MILLIGRAM(S): 80 TABLET, CHEWABLE ORAL at 06:22

## 2023-11-08 RX ADMIN — SIMETHICONE 80 MILLIGRAM(S): 80 TABLET, CHEWABLE ORAL at 12:14

## 2023-11-08 RX ADMIN — CHLORHEXIDINE GLUCONATE 15 MILLILITER(S): 213 SOLUTION TOPICAL at 06:00

## 2023-11-08 RX ADMIN — Medication 2: at 00:40

## 2023-11-08 RX ADMIN — Medication 5 MILLIGRAM(S): at 12:14

## 2023-11-08 RX ADMIN — CEFEPIME 100 MILLIGRAM(S): 1 INJECTION, POWDER, FOR SOLUTION INTRAMUSCULAR; INTRAVENOUS at 17:19

## 2023-11-08 RX ADMIN — CHLORHEXIDINE GLUCONATE 15 MILLILITER(S): 213 SOLUTION TOPICAL at 17:19

## 2023-11-08 RX ADMIN — POLYETHYLENE GLYCOL 3350 17 GRAM(S): 17 POWDER, FOR SOLUTION ORAL at 17:18

## 2023-11-08 RX ADMIN — GABAPENTIN 100 MILLIGRAM(S): 400 CAPSULE ORAL at 22:00

## 2023-11-08 RX ADMIN — Medication 2 DROP(S): at 06:28

## 2023-11-08 RX ADMIN — Medication 5 MILLIGRAM(S): at 06:00

## 2023-11-08 RX ADMIN — LIDOCAINE 1 PATCH: 4 CREAM TOPICAL at 00:20

## 2023-11-08 RX ADMIN — QUETIAPINE FUMARATE 12.5 MILLIGRAM(S): 200 TABLET, FILM COATED ORAL at 22:40

## 2023-11-08 RX ADMIN — Medication 2 DROP(S): at 12:15

## 2023-11-08 NOTE — PROGRESS NOTE ADULT - ASSESSMENT
74 y/o F with PMHx of T2DM, HTN, HLD, anemia, hypothyroidism, RA, fibromyalgia, remote cerebral aneurysm repair, acute hypercapnic respiratory failure now with tracheostomy, PEG tube, and bedbound (trach change 8/18, PEG change 8/14), sacral pressure ulcer, BIBEMS from home for 6 day hx of bleeding from the tracheostomy and PEG tube with associated abdominal pain, recent history of auditory and visual hallucinations, and with chronic sacral decubitus ulcer. Exam notable for mild abdominal distention with LLQ and RLQ tenderness, tracheostomy in place with no obvious bleeding, PEG tube with scant amount of dried blood, at baseline neurologic status. Imaging showing no active bleeding around tracheostomy site, however was notable for mild thickening along PEG tube tract, sacral ulcer extending to the coccyx suggestive of possible osteomyelitis, and bibasilar patchy lung opacities with volume loss. Patient admitted for respiratory support, wound care of tracheostomy and PEG, and management of presumed sacral osteomyelitis.         74 y/o F with PMHx of T2DM, HTN, HLD, anemia, hypothyroidism, RA, fibromyalgia, remote cerebral aneurysm repair, acute hypercapnic respiratory failure now with tracheostomy, PEG tube, and bedbound (trach change 8/18, PEG change 8/14), sacral pressure ulcer, BIBEMS from home for 6 day hx of bleeding from the tracheostomy and PEG tube with associated abdominal pain, recent history of auditory and visual hallucinations, and with chronic sacral decubitus ulcer. Exam notable for mild abdominal distention with LLQ and RLQ tenderness, tracheostomy in place with no obvious bleeding, PEG tube with scant amount of dried blood, at baseline neurologic status. Imaging showing no active bleeding around tracheostomy site, however was notable for mild thickening along PEG tube tract, sacral ulcer extending to the coccyx suggestive of possible osteomyelitis, and bibasilar patchy lung opacities with volume loss. Patient admitted for respiratory support, wound care of tracheostomy and PEG, and management of presumed sacral osteomyelitis.    11/8: No fevers overnight, Spu Cx 11/6: + PSA and Serratia, sensitivities pending; already on Longterm Abx to cover sacral OM, no changes in Abx pending speciation. Psych as to follow up with daughter per request. F/u AM Labs        72 y/o F with PMHx of T2DM, HTN, HLD, anemia, hypothyroidism, RA, fibromyalgia, remote cerebral aneurysm repair, acute hypercapnic respiratory failure now with tracheostomy, PEG tube, and bedbound (trach change 8/18, PEG change 8/14), sacral pressure ulcer, BIBEMS from home for 6 day hx of bleeding from the tracheostomy and PEG tube with associated abdominal pain, recent history of auditory and visual hallucinations, and with chronic sacral decubitus ulcer. Exam notable for mild abdominal distention with LLQ and RLQ tenderness, tracheostomy in place with no obvious bleeding, PEG tube with scant amount of dried blood, at baseline neurologic status. Imaging showing no active bleeding around tracheostomy site, however was notable for mild thickening along PEG tube tract, sacral ulcer extending to the coccyx suggestive of possible osteomyelitis, and bibasilar patchy lung opacities with volume loss. Patient admitted for respiratory support, wound care of tracheostomy and PEG, and management of presumed sacral osteomyelitis.    11/8: No fevers overnight, Spu Cx 11/6: + PSA and Serratia, sensitivities pending; Abx changed to Cefepime while pending speciation. Psych as to follow up with daughter per request. F/u AM Labs

## 2023-11-08 NOTE — PROGRESS NOTE ADULT - PROBLEM SELECTOR PLAN 6
-Continue home Prednisone regimen  *fibromyalgia  -continue home Gabapentin 100mg daily  -continue home Fentanyl patches  - added Lidocaine patch   - added Percocet q6 hrs PRN (in addition to Tylenol PRN)

## 2023-11-08 NOTE — PROGRESS NOTE ADULT - PROBLEM SELECTOR PLAN 4
-S/p CT Abd/Pelvis: No emergent findings or active bleed; 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; 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   -Bowel regimen  - PEG Site appears macerated. Multifactorial, possible the PEG has been too tight at home.    -Continue Simthicone QID

## 2023-11-08 NOTE — PROGRESS NOTE ADULT - PROBLEM SELECTOR PLAN 8
Continue Home Lexapro 10mg daily   Psych consult apreciated  Continue Seroquel 12.5mg Q12 0600, 1800

## 2023-11-08 NOTE — PROGRESS NOTE ADULT - SUBJECTIVE AND OBJECTIVE BOX
Patient is a 73y old  Female who presents with a chief complaint of     HPI:  11/8/2023    Patient is at baseline mental state. No new symptoms  On [facility] vent. Afebrile, no distress noted.    PAST MEDICAL & SURGICAL HISTORY:  Diabetes      Rheumatoid arthritis      Fibromyalgia      Hypothyroid      Hypertension      H/O tracheostomy      PEG (percutaneous endoscopic gastrostomy) status          ALLERY AND IMMUNOLOGIC: No hives or eczema    Allergies    penicillin (Unknown)  heparin (Unknown)  Tagamet (Unknown)  pineapple (Unknown)  walnut (Unknown)  Pecan, Filbert, Hazelnut (Unknown)  Lyrica (Unknown)    Intolerances        Social History:     FAMILY HISTORY:      MEDICATIONS  (STANDING):  acetaminophen   IVPB .. 1000 milliGRAM(s) IV Intermittent once  acetylcysteine 10%  Inhalation 4 milliLiter(s) Inhalation once  albuterol/ipratropium for Nebulization 3 milliLiter(s) Nebulizer every 6 hours  artificial  tears Solution 2 Drop(s) Both EYES every 6 hours  aztreonam  IVPB      aztreonam  IVPB 1000 milliGRAM(s) IV Intermittent every 8 hours  calcium carbonate    500 mG (Tums) Chewable 1 Tablet(s) Chew every 12 hours  chlorhexidine 0.12% Liquid 15 milliLiter(s) Oral Mucosa every 12 hours  chlorhexidine 4% Liquid 1 Application(s) Topical <User Schedule>  dextrose 50% Injectable 50 milliLiter(s) IV Push once  escitalopram 10 milliGRAM(s) Oral daily  fentaNYL   Patch  25 MICROgram(s)/Hr 1 Patch Transdermal every 72 hours  gabapentin Solution 100 milliGRAM(s) Enteral Tube at bedtime  insulin lispro (ADMELOG) corrective regimen sliding scale   SubCutaneous every 6 hours  levothyroxine 100 MICROGram(s) Enteral Tube daily  multivitamin/minerals/iron Oral Solution (CENTRUM) 15 milliLiter(s) Oral daily  pantoprazole   Suspension 40 milliGRAM(s) Enteral Tube daily  predniSONE   Tablet 5 milliGRAM(s) Oral daily  QUEtiapine 12.5 milliGRAM(s) Oral at bedtime  simethicone 80 milliGRAM(s) Chew four times a day  sodium chloride 3%  Inhalation 4 milliLiter(s) Inhalation every 6 hours  vancomycin  IVPB 1000 milliGRAM(s) IV Intermittent every 12 hours  zinc sulfate 220 milliGRAM(s) Oral daily    MEDICATIONS  (PRN):  sodium chloride 0.65% Nasal 2 Spray(s) Both Nostrils two times a day PRN Nasal Congestion          I&O's Summary    27 Oct 2023 07:01  -  28 Oct 2023 07:00  --------------------------------------------------------  IN: 200 mL / OUT: 400 mL / NET: -200 mL    28 Oct 2023 07:01  -  28 Oct 2023 19:00  --------------------------------------------------------  IN: 710 mL / OUT: 0 mL / NET: 710 mL        PHYSICAL EXAM:  Vital Signs Last 24 Hrs  T(C): 36.2 (08 Nov 2023 13:16), Max: 36.7 (07 Nov 2023 20:54)  T(F): 97.1 (08 Nov 2023 13:16), Max: 98 (07 Nov 2023 20:54)  HR: 65 (08 Nov 2023 13:16) (59 - 76)  BP: 154/71 (08 Nov 2023 13:16) (132/66 - 154/71)  BP(mean): --  RR: 18 (08 Nov 2023 13:16) (16 - 18)  SpO2: 100% (08 Nov 2023 13:16) (96% - 100%)    Parameters below as of 08 Nov 2023 13:16  Patient On (Oxygen Delivery Method): ventilator      GENERAL: NAD, well-developed  HEAD:  Atraumatic, Normocephalic  EYES: EOMI, PERRLA, conjunctiva and sclera clear  NECK: Supple, No JVD. Trach in place, connected to mech vent  CHEST/LUNG: Clear to auscultation bilaterally; No wheeze  HEART: Regular rate and rhythm; No murmurs, rubs, or gallops  ABDOMEN: Soft, tender, Nondistended; Bowel sounds present PEG in place  EXTREMITIES:  2+ Peripheral Pulses, No clubbing, cyanosis, or edema  PSYCH: AAOx3  NEUROLOGY: Bed bound, functionally quadriplegic  SKIN: No rashes or lesions    LABS:                        8.5    6.07  )-----------( 117      ( 28 Oct 2023 07:19 )             28.4     10-28    135  |  100  |  16  ----------------------------<  77  3.3<L>   |  27  |  <0.30<L>    Ca    8.3<L>      28 Oct 2023 07:19  Phos  3.2     10-28  Mg     1.9     10-28    TPro  6.7  /  Alb  3.0<L>  /  TBili  0.2  /  DBili  x   /  AST  21  /  ALT  23  /  AlkPhos  47  10-28    PT/INR - ( 27 Oct 2023 14:39 )   PT: 12.3 sec;   INR: 1.12 ratio         PTT - ( 27 Oct 2023 14:39 )  PTT:30.8 sec      Urinalysis Basic - ( 28 Oct 2023 07:19 )    Color: x / Appearance: x / SG: x / pH: x  Gluc: 77 mg/dL / Ketone: x  / Bili: x / Urobili: x   Blood: x / Protein: x / Nitrite: x   Leuk Esterase: x / RBC: x / WBC x   Sq Epi: x / Non Sq Epi: x / Bacteria: x        RADIOLOGY & ADDITIONAL TESTS:    Imaging Personally Reviewed:    Consultant(s) Notes Reviewed:      Care Discussed with Consultants/Other Providers:

## 2023-11-08 NOTE — PROGRESS NOTE ADULT - PROBLEM SELECTOR PLAN 2
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   - S/p CT abd/pelvis (10/28): Sacral decubitus ulcer extending to the coccyx with osseous remodeling and pelvic MRI or bone scan to recc to exclude osteomyelitis.  - MRI pelvis 10/30 with Osteomyelitis, c/w current Cefepime for 7 days, Vancomycin d/marli   - Elevated, ESR 85   - Elevated,    - Wound care on board  - Continue Cipro 500mg q 12 and Keflex 500mg q 6 until 12/10.

## 2023-11-08 NOTE — PROGRESS NOTE ADULT - SUBJECTIVE AND OBJECTIVE BOX
Patient is a 73y old  Female who presents with a chief complaint of 72 y/o F with PMHx of T2DM, HTN, HLD, anemia, hypothyroidism, RA, fibromyalgia, remote cerebral aneurysm repair, acute hypercapnic respiratory failure now with tracheostomy, PEG tube, and bedbound (trach change 8/18, PEG change 8/14), sacral pressure ulcer, BIBEMS from home for 6 day hx of bleeding from the tracheostomy and PEG tube with associated abdominal pain, recent history of auditory and visual hallucinations, and with chronic sacral decubitus ulcer.    (29 Oct 2023 14:01)      Interval Events:    REVIEW OF SYSTEMS:  [ ] Positive  [ ] All other systems negative  [ ] Unable to assess ROS because ________    Vital Signs Last 24 Hrs  T(C): 36.2 (11-08-23 @ 04:30), Max: 36.7 (11-07-23 @ 20:54)  T(F): 97.1 (11-08-23 @ 04:30), Max: 98 (11-07-23 @ 20:54)  HR: 59 (11-08-23 @ 04:30) (59 - 76)  BP: 139/64 (11-08-23 @ 04:30) (132/66 - 152/63)  RR: 16 (11-08-23 @ 04:30) (12 - 17)  SpO2: 100% (11-08-23 @ 04:30) (96% - 100%)PHYSICAL EXAM:  HEENT:   [ ]Tracheostomy:  [ ]Pupils equal  [ ]No oral lesions  [ ]Abnormal        SKIN  [ ]No Rash  [ ] Abnormal  [ ] pressure    CARDIAC  [ ]Regular  [ ]Abnormal    PULMONARY  [ ]Bilateral Clear Breath Sounds  [ ]Normal Excursion  [ ]Abnormal    GI  [ ]PEG      [ ] +BS		              [ ]Soft, nondistended, nontender	  [ ]Abnormal    MUSCULOSKELETAL                                   [ ]Bedbound                 [ ]Abnormal    [ ]Ambulatory/OOB to chair                           EXTREMITIES                                         [ ]Normal  [ ]Edema                           NEUROLOGIC  [ ] Normal, non focal  [ ] Focal findings:    PSYCHIATRIC  [ ]Alert and appropriate  [ ] Sedated	 [ ]Agitated    :  Mcbride: [ ] Yes, if yes: Date of Placement:                   [  ] No    LINES: Central Lines [ ] Yes, if yes: Date of Placement                                     [  ] No    HOSPITAL MEDICATIONS:  MEDICATIONS  (STANDING):  artificial  tears Solution 2 Drop(s) Both EYES every 6 hours  ascorbic acid 500 milliGRAM(s) Oral daily  calcium carbonate    500 mG (Tums) Chewable 1 Tablet(s) Chew every 12 hours  cephalexin 500 milliGRAM(s) Oral every 6 hours  chlorhexidine 0.12% Liquid 15 milliLiter(s) Oral Mucosa every 12 hours  chlorhexidine 4% Liquid 1 Application(s) Topical <User Schedule>  ciprofloxacin     Tablet 500 milliGRAM(s) Oral every 12 hours  dextrose 50% Injectable 50 milliLiter(s) IV Push once  enoxaparin Injectable 40 milliGRAM(s) SubCutaneous every 24 hours  escitalopram 10 milliGRAM(s) Oral daily  gabapentin Solution 100 milliGRAM(s) Enteral Tube at bedtime  insulin glargine Injectable (LANTUS) 14 Unit(s) SubCutaneous every morning  insulin lispro (ADMELOG) corrective regimen sliding scale   SubCutaneous every 6 hours  lactobacillus acidophilus 1 Tablet(s) Oral daily  levothyroxine 100 MICROGram(s) Enteral Tube <User Schedule>  lidocaine   4% Patch 1 Patch Transdermal daily  multivitamin/minerals/iron Oral Solution (CENTRUM) 15 milliLiter(s) Oral daily  oxybutynin 5 milliGRAM(s) Oral daily  pantoprazole   Suspension 40 milliGRAM(s) Enteral Tube daily  polyethylene glycol 3350 17 Gram(s) Oral two times a day  predniSONE   Tablet 5 milliGRAM(s) Oral daily  QUEtiapine 12.5 milliGRAM(s) Oral <User Schedule>  simethicone 80 milliGRAM(s) Chew four times a day  zinc sulfate 220 milliGRAM(s) Oral daily    MEDICATIONS  (PRN):  acetaminophen   Oral Liquid .. 650 milliGRAM(s) Oral every 6 hours PRN Temp greater or equal to 38C (100.4F), mild Pain (1-3)  albuterol/ipratropium for Nebulization 3 milliLiter(s) Nebulizer every 6 hours PRN Bronchospasm  diphenhydrAMINE Elixir 25 milliGRAM(s) Oral every 6 hours PRN Rash and/or Itching  oxycodone    5 mG/acetaminophen 325 mG 1 Tablet(s) Oral every 6 hours PRN Moderate Pain (4 - 6)  sodium chloride 0.65% Nasal 2 Spray(s) Both Nostrils two times a day PRN Nasal Congestion      LABS:                        9.5    7.55  )-----------( 107      ( 06 Nov 2023 10:17 )             30.9     11-06    137  |  102  |  20  ----------------------------<  156<H>  4.3   |  22  |  <0.30<L>    Ca    9.2      06 Nov 2023 10:17  Phos  4.0     11-06  Mg     2.0     11-06        Urinalysis Basic - ( 06 Nov 2023 10:17 )    Color: x / Appearance: x / SG: x / pH: x  Gluc: 156 mg/dL / Ketone: x  / Bili: x / Urobili: x   Blood: x / Protein: x / Nitrite: x   Leuk Esterase: x / RBC: x / WBC x   Sq Epi: x / Non Sq Epi: x / Bacteria: x      Arterial Blood Gas:  11-07 @ 23:08  7.47/37/158/27/99.2/3.1  ABG lactate: --      CAPILLARY BLOOD GLUCOSE    MICROBIOLOGY:     RADIOLOGY:  [ ] Reviewed and interpreted by me    Mode: AC/ CMV (Assist Control/ Continuous Mandatory Ventilation)  RR (machine): 12  TV (machine): 300  FiO2: 30  PEEP: 5  ITime: 1  MAP: 7  PIP: 25   Patient is a 73y old  Female who presents with a chief complaint of 72 y/o F with PMHx of T2DM, HTN, HLD, anemia, hypothyroidism, RA, fibromyalgia, remote cerebral aneurysm repair, acute hypercapnic respiratory failure now with tracheostomy, PEG tube, and bedbound (trach change 8/18, PEG change 8/14), sacral pressure ulcer, BIBEMS from home for 6 day hx of bleeding from the tracheostomy and PEG tube with associated abdominal pain, recent history of auditory and visual hallucinations, and with chronic sacral decubitus ulcer.    (29 Oct 2023 14:01)      Interval Events:     REVIEW OF SYSTEMS:  [x ] Positive: Restless overnight   [ ] All other systems negative  [x ] Unable to assess ROS because _ ______    Vital Signs Last 24 Hrs  T(C): 36.2 (11-08-23 @ 04:30), Max: 36.7 (11-07-23 @ 20:54)  T(F): 97.1 (11-08-23 @ 04:30), Max: 98 (11-07-23 @ 20:54)  HR: 59 (11-08-23 @ 04:30) (59 - 76)  BP: 139/64 (11-08-23 @ 04:30) (132/66 - 152/63)  RR: 16 (11-08-23 @ 04:30) (12 - 17)  SpO2: 100% (11-08-23 @ 04:30) (96% - 100%)    PHYSICAL EXAM:    HEENT:   [ X ]Tracheostomy: #9 Portex Cuffed   [ X ]Pupils equal  [ ]No oral lesions  [ ]Abnormal    SKIN  [ X ] No Rash  [ X ] Abnormal - sacral wound  [ ] pressure    CARDIAC  [ X ]Regular  [ ]Abnormal    PULMONARY  [ ] Bilateral Clear Breath Sounds  [ ]Normal Excursion  [ X ] Abnormal - diminished BS at b/l bases    GI  [X] PEG c/d/i      [ X] +BS		              [X] Soft, nondistended, nontender	  [ ]Abnormal    MUSCULOSKELETAL                                   [X] Bedbound                 [ ]Abnormal    [ ]Ambulatory/OOB to chair                           EXTREMITIES                                         [X]Normal  [ ]Edema                           NEUROLOGIC  [X] Normal, non focal  [ ] Focal findings:    PSYCHIATRIC  [X] Alert and appropriate  [ ] Sedated	 [ ]Agitated    :  Mcbride: [ ] Yes, if yes: Date of Placement:                   [X] No    LINES: Central Lines [ ] Yes, if yes: Date of Placement                                     [X] No      HOSPITAL MEDICATIONS:  MEDICATIONS  (STANDING):  artificial  tears Solution 2 Drop(s) Both EYES every 6 hours  ascorbic acid 500 milliGRAM(s) Oral daily  calcium carbonate    500 mG (Tums) Chewable 1 Tablet(s) Chew every 12 hours  cephalexin 500 milliGRAM(s) Oral every 6 hours  chlorhexidine 0.12% Liquid 15 milliLiter(s) Oral Mucosa every 12 hours  chlorhexidine 4% Liquid 1 Application(s) Topical <User Schedule>  ciprofloxacin     Tablet 500 milliGRAM(s) Oral every 12 hours  dextrose 50% Injectable 50 milliLiter(s) IV Push once  enoxaparin Injectable 40 milliGRAM(s) SubCutaneous every 24 hours  escitalopram 10 milliGRAM(s) Oral daily  gabapentin Solution 100 milliGRAM(s) Enteral Tube at bedtime  insulin glargine Injectable (LANTUS) 14 Unit(s) SubCutaneous every morning  insulin lispro (ADMELOG) corrective regimen sliding scale   SubCutaneous every 6 hours  lactobacillus acidophilus 1 Tablet(s) Oral daily  levothyroxine 100 MICROGram(s) Enteral Tube <User Schedule>  lidocaine   4% Patch 1 Patch Transdermal daily  multivitamin/minerals/iron Oral Solution (CENTRUM) 15 milliLiter(s) Oral daily  oxybutynin 5 milliGRAM(s) Oral daily  pantoprazole   Suspension 40 milliGRAM(s) Enteral Tube daily  polyethylene glycol 3350 17 Gram(s) Oral two times a day  predniSONE   Tablet 5 milliGRAM(s) Oral daily  QUEtiapine 12.5 milliGRAM(s) Oral <User Schedule>  simethicone 80 milliGRAM(s) Chew four times a day  zinc sulfate 220 milliGRAM(s) Oral daily    MEDICATIONS  (PRN):  acetaminophen   Oral Liquid .. 650 milliGRAM(s) Oral every 6 hours PRN Temp greater or equal to 38C (100.4F), mild Pain (1-3)  albuterol/ipratropium for Nebulization 3 milliLiter(s) Nebulizer every 6 hours PRN Bronchospasm  diphenhydrAMINE Elixir 25 milliGRAM(s) Oral every 6 hours PRN Rash and/or Itching  oxycodone    5 mG/acetaminophen 325 mG 1 Tablet(s) Oral every 6 hours PRN Moderate Pain (4 - 6)  sodium chloride 0.65% Nasal 2 Spray(s) Both Nostrils two times a day PRN Nasal Congestion      LABS:                        9.5    7.55  )-----------( 107      ( 06 Nov 2023 10:17 )             30.9     11-06    137  |  102  |  20  ----------------------------<  156<H>  4.3   |  22  |  <0.30<L>    Ca    9.2      06 Nov 2023 10:17  Phos  4.0     11-06  Mg     2.0     11-06        Urinalysis Basic - ( 06 Nov 2023 10:17 )    Color: x / Appearance: x / SG: x / pH: x  Gluc: 156 mg/dL / Ketone: x  / Bili: x / Urobili: x   Blood: x / Protein: x / Nitrite: x   Leuk Esterase: x / RBC: x / WBC x   Sq Epi: x / Non Sq Epi: x / Bacteria: x      Arterial Blood Gas:  11-07 @ 23:08  7.47/37/158/27/99.2/3.1  ABG lactate: --      CAPILLARY BLOOD GLUCOSE    MICROBIOLOGY:     RADIOLOGY:  [ ] Reviewed and interpreted by me    Mode: AC/ CMV (Assist Control/ Continuous Mandatory Ventilation)  RR (machine): 12  TV (machine): 300  FiO2: 30  PEEP: 5  ITime: 1  MAP: 7  PIP: 25   Patient is a 73y old  Female who presents with a chief complaint of 72 y/o F with PMHx of T2DM, HTN, HLD, anemia, hypothyroidism, RA, fibromyalgia, remote cerebral aneurysm repair, acute hypercapnic respiratory failure now with tracheostomy, PEG tube, and bedbound (trach change 8/18, PEG change 8/14), sacral pressure ulcer, BIBEMS from home for 6 day hx of bleeding from the tracheostomy and PEG tube with associated abdominal pain, recent history of auditory and visual hallucinations, and with chronic sacral decubitus ulcer.    (29 Oct 2023 14:01)      Interval Events: No overnight event     REVIEW OF SYSTEMS:  [x ] Positive: Restless overnight   [ ] All other systems negative  [x ] Unable to assess ROS because _ ______    Vital Signs Last 24 Hrs  T(C): 36.2 (11-08-23 @ 04:30), Max: 36.7 (11-07-23 @ 20:54)  T(F): 97.1 (11-08-23 @ 04:30), Max: 98 (11-07-23 @ 20:54)  HR: 59 (11-08-23 @ 04:30) (59 - 76)  BP: 139/64 (11-08-23 @ 04:30) (132/66 - 152/63)  RR: 16 (11-08-23 @ 04:30) (12 - 17)  SpO2: 100% (11-08-23 @ 04:30) (96% - 100%)    PHYSICAL EXAM:    HEENT:   [ X ]Tracheostomy: #9 Portex Cuffed   [ X ]Pupils equal  [ ]No oral lesions  [ ]Abnormal    SKIN  [ X ] No Rash  [ X ] Abnormal - sacral wound  [ ] pressure    CARDIAC  [ X ]Regular  [ ]Abnormal    PULMONARY  [ ] Bilateral Clear Breath Sounds  [ ]Normal Excursion  [ X ] Abnormal - diminished BS at b/l bases    GI  [X] PEG c/d/i      [ X] +BS		              [X] Soft, nondistended, nontender	  [ ]Abnormal    MUSCULOSKELETAL                                   [X] Bedbound                 [ ]Abnormal    [ ]Ambulatory/OOB to chair                           EXTREMITIES                                         [X]Normal  [ ]Edema                           NEUROLOGIC  [X] Normal, non focal  [ ] Focal findings:    PSYCHIATRIC  [X] Alert and appropriate  [ ] Sedated	 [ ]Agitated    :  Mcbride: [ ] Yes, if yes: Date of Placement:                   [X] No    LINES: Central Lines [ ] Yes, if yes: Date of Placement                                     [X] No      HOSPITAL MEDICATIONS:  MEDICATIONS  (STANDING):  artificial  tears Solution 2 Drop(s) Both EYES every 6 hours  ascorbic acid 500 milliGRAM(s) Oral daily  calcium carbonate    500 mG (Tums) Chewable 1 Tablet(s) Chew every 12 hours  cephalexin 500 milliGRAM(s) Oral every 6 hours  chlorhexidine 0.12% Liquid 15 milliLiter(s) Oral Mucosa every 12 hours  chlorhexidine 4% Liquid 1 Application(s) Topical <User Schedule>  ciprofloxacin     Tablet 500 milliGRAM(s) Oral every 12 hours  dextrose 50% Injectable 50 milliLiter(s) IV Push once  enoxaparin Injectable 40 milliGRAM(s) SubCutaneous every 24 hours  escitalopram 10 milliGRAM(s) Oral daily  gabapentin Solution 100 milliGRAM(s) Enteral Tube at bedtime  insulin glargine Injectable (LANTUS) 14 Unit(s) SubCutaneous every morning  insulin lispro (ADMELOG) corrective regimen sliding scale   SubCutaneous every 6 hours  lactobacillus acidophilus 1 Tablet(s) Oral daily  levothyroxine 100 MICROGram(s) Enteral Tube <User Schedule>  lidocaine   4% Patch 1 Patch Transdermal daily  multivitamin/minerals/iron Oral Solution (CENTRUM) 15 milliLiter(s) Oral daily  oxybutynin 5 milliGRAM(s) Oral daily  pantoprazole   Suspension 40 milliGRAM(s) Enteral Tube daily  polyethylene glycol 3350 17 Gram(s) Oral two times a day  predniSONE   Tablet 5 milliGRAM(s) Oral daily  QUEtiapine 12.5 milliGRAM(s) Oral <User Schedule>  simethicone 80 milliGRAM(s) Chew four times a day  zinc sulfate 220 milliGRAM(s) Oral daily    MEDICATIONS  (PRN):  acetaminophen   Oral Liquid .. 650 milliGRAM(s) Oral every 6 hours PRN Temp greater or equal to 38C (100.4F), mild Pain (1-3)  albuterol/ipratropium for Nebulization 3 milliLiter(s) Nebulizer every 6 hours PRN Bronchospasm  diphenhydrAMINE Elixir 25 milliGRAM(s) Oral every 6 hours PRN Rash and/or Itching  oxycodone    5 mG/acetaminophen 325 mG 1 Tablet(s) Oral every 6 hours PRN Moderate Pain (4 - 6)  sodium chloride 0.65% Nasal 2 Spray(s) Both Nostrils two times a day PRN Nasal Congestion      LABS:                        9.5    7.55  )-----------( 107      ( 06 Nov 2023 10:17 )             30.9     11-06    137  |  102  |  20  ----------------------------<  156<H>  4.3   |  22  |  <0.30<L>    Ca    9.2      06 Nov 2023 10:17  Phos  4.0     11-06  Mg     2.0     11-06        Urinalysis Basic - ( 06 Nov 2023 10:17 )    Color: x / Appearance: x / SG: x / pH: x  Gluc: 156 mg/dL / Ketone: x  / Bili: x / Urobili: x   Blood: x / Protein: x / Nitrite: x   Leuk Esterase: x / RBC: x / WBC x   Sq Epi: x / Non Sq Epi: x / Bacteria: x      Arterial Blood Gas:  11-07 @ 23:08  7.47/37/158/27/99.2/3.1  ABG lactate: --      CAPILLARY BLOOD GLUCOSE    MICROBIOLOGY:     RADIOLOGY:  [ ] Reviewed and interpreted by me    Mode: AC/ CMV (Assist Control/ Continuous Mandatory Ventilation)  RR (machine): 12  TV (machine): 300  FiO2: 30  PEEP: 5  ITime: 1  MAP: 7  PIP: 25

## 2023-11-08 NOTE — PROGRESS NOTE ADULT - NS ATTEND AMEND GEN_ALL_CORE FT
73F w/ DM, HTN, HLD, anemia, hypothyroidism, RA, fibromyalgia, remote cerebral aneursym repair, hypercapnic respiratory failure s/p tracheostomy/PEG, bedbound, sacral pressure ulcer. Admitted w/ bleeding from tracheostomy and PEG w/ associated abdominal pain, recent hx of auditory/visual hallucinations. Since admission, no further bleeding noted from either trach or PEG. Imaging showed mild thickening along PEG tube tract and sacral ulcer extending to coccyx suggestive of OM.     MRI with osteomyelitis  Repeat sputum now with serratia, increased secretions back on abx.     # Osteomyelitis  # Chronic hypoxemic RF  # oropharyngeal dysphagia  # acute on chronic pain  # Trach/Peg bleeding  # Tracheitis with PA and serratia  # Hx of hallucaination, anxiety  - MRI showing Osteo, on abx per ID, c/w wound care, no plans for surgery. Will need prolonged course of abx  - C/W vent, adjust as needed, will need vent on discharge  - Repeat sputum culture, with PA and serratia ,PA may be colonization but serratia appers new. Will restart cefepime. Will f/u with ID tomorrow.   - family requested S/S eval but unable to given dependence on vent  - C/W pain control, multifactorial 2/2 to fibromyalgia as well as now osteo with pressure ulcer  - Peg irritation seen by GI, no revs for further interventions. H/H stable  - Will up titrate Seroquel, check QTC, seen by  recommend Seroquel for delirium. Family requesting re-eval, was told psych would follow up. Will reconsult when patient is able to participate.   - DVT ppx- lovenox  - Dispo- full code. Pending preparation to discharge home on vent.

## 2023-11-08 NOTE — PROGRESS NOTE ADULT - ASSESSMENT
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 Bleeding from gastrostomy. Site appears macerated. Multifactorial, possible the PEG has been too tight at home.  -PEG c/d/i   -no further bleeding  -wound care following    2. Leakage at PEG site.   -no further leaking     3. Abdominal pain. CT negative for acute pathology.  -rec miralax BID    4. Respiratory   per RCU     The plan of care was discussed with the physician assistant and modifications were made to the notation where appropriate.   Differential diagnosis and plan of care discussed with patient after the evaluation  35 minutes spent on total encounter of which more than fifty percent of the encounter was spent counseling and/or coordinating care by the attending physician.    [facility]  Andrew Morgan M.D.   [address], [place]  Office: [phone]  Cell: [phone]

## 2023-11-08 NOTE — PROGRESS NOTE ADULT - SUBJECTIVE AND OBJECTIVE BOX
INTERVAL HPI/OVERNIGHT EVENTS:    Patient seen and examined. family at bedside. tolerating peg feeds and having bms.     MEDICATIONS  (STANDING):  artificial  tears Solution 2 Drop(s) Both EYES every 6 hours  ascorbic acid 500 milliGRAM(s) Oral daily  calcium carbonate    500 mG (Tums) Chewable 1 Tablet(s) Chew every 12 hours  cephalexin 500 milliGRAM(s) Oral every 6 hours  chlorhexidine 0.12% Liquid 15 milliLiter(s) Oral Mucosa every 12 hours  chlorhexidine 4% Liquid 1 Application(s) Topical <User Schedule>  ciprofloxacin     Tablet 500 milliGRAM(s) Oral every 12 hours  dextrose 50% Injectable 50 milliLiter(s) IV Push once  enoxaparin Injectable 40 milliGRAM(s) SubCutaneous every 24 hours  escitalopram 10 milliGRAM(s) Oral daily  fentaNYL   Patch  25 MICROgram(s)/Hr 1 Patch Transdermal every 72 hours  gabapentin Solution 100 milliGRAM(s) Enteral Tube at bedtime  insulin glargine Injectable (LANTUS) 14 Unit(s) SubCutaneous every morning  insulin lispro (ADMELOG) corrective regimen sliding scale   SubCutaneous every 6 hours  lactobacillus acidophilus 1 Tablet(s) Oral daily  levothyroxine 100 MICROGram(s) Enteral Tube <User Schedule>  lidocaine   4% Patch 1 Patch Transdermal daily  multivitamin/minerals/iron Oral Solution (CENTRUM) 15 milliLiter(s) Oral daily  oxybutynin 5 milliGRAM(s) Oral daily  pantoprazole   Suspension 40 milliGRAM(s) Enteral Tube daily  polyethylene glycol 3350 17 Gram(s) Oral two times a day  predniSONE   Tablet 5 milliGRAM(s) Oral daily  QUEtiapine 12.5 milliGRAM(s) Oral <User Schedule>  simethicone 80 milliGRAM(s) Chew four times a day  zinc sulfate 220 milliGRAM(s) Oral daily    MEDICATIONS  (PRN):  acetaminophen   Oral Liquid .. 650 milliGRAM(s) Oral every 6 hours PRN Temp greater or equal to 38C (100.4F), mild Pain (1-3)  albuterol/ipratropium for Nebulization 3 milliLiter(s) Nebulizer every 6 hours PRN Bronchospasm  diphenhydrAMINE Elixir 25 milliGRAM(s) Oral every 6 hours PRN Rash and/or Itching  oxyCODONE    Solution 5 milliGRAM(s) Oral every 6 hours PRN Moderate Pain (4 - 6)  sodium chloride 0.65% Nasal 2 Spray(s) Both Nostrils two times a day PRN Nasal Congestion      Allergies    penicillin (Unknown)  heparin (Unknown)  Tagamet (Unknown)  pineapple (Unknown)  walnut (Unknown)  Pecan, Filbert, Hazelnut (Unknown)  Lyrica (Unknown)    Intolerances        Review of Systems:    unable to obtain    Vital Signs Last 24 Hrs  T(C): 36.2 (08 Nov 2023 13:16), Max: 36.7 (07 Nov 2023 20:54)  T(F): 97.1 (08 Nov 2023 13:16), Max: 98 (07 Nov 2023 20:54)  HR: 65 (08 Nov 2023 13:16) (59 - 76)  BP: 154/71 (08 Nov 2023 13:16) (132/66 - 154/71)  BP(mean): --  RR: 18 (08 Nov 2023 13:16) (16 - 18)  SpO2: 100% (08 Nov 2023 13:16) (96% - 100%)    Parameters below as of 08 Nov 2023 13:16  Patient On (Oxygen Delivery Method): ventilator        PHYSICAL EXAM:    Constitutional: NAD  HEENT: EOMI, throat clear  Neck: No LAD, supple  Respiratory: CTA and P  Cardiovascular: S1 and S2, RRR, no M  Gastrointestinal: BS+, soft, NT/ND, neg HSM, +peg c/d/i  Extremities: No peripheral edema, neg clubbing, cyanosis  Vascular: 2+ peripheral pulses  Neurological: A/O   Psychiatric: Normal mood, normal affect  Skin: No rashes      LABS:                RADIOLOGY & ADDITIONAL TESTS:

## 2023-11-08 NOTE — PROGRESS NOTE ADULT - PROBLEM SELECTOR PLAN 3
Chronic Trach/ Vent dependant 2/2 Hypercapnic Resp Failure since 10/2022   -S/p Trach # 8 Cuffed Flex Shiley; trach change 8/18, PEG change 8/14 per records   - Continue Home vent settings: (300 TV/ RR 12/5 Peep/ Fio2 30%)  - Tolerates intermittent PSV 15/5 during day/ Vent HS  - Airway Clearance: Duoneb, Hypersal, Chest PT, PRN suctioning   - Maintain 02 sat > 92%    Tracheal Bleeding (Intermittent):   -S/p CT Angio neck: No evidence of active bleeding around tracheostomy site. No hematoma.  No collection   - Advise RN staff to use Red rubber trach catheters to avoid suction trauma   - Seen by ENT; Kath and b/l bronchi visualized without any trauma. small amount of blood tinged secretions resting at beginning of right bronchus not actively bleeding.  - 11/3 trach changed to #9 Portex

## 2023-11-08 NOTE — PROGRESS NOTE ADULT - PROBLEM SELECTOR PLAN 1
Found w/ Bilateral PNA vs Atelectasis, and Possible OM Sacrum   S/p Fevers at home, Afebrile since admission   - S/p Chest CT (10/28):  c/w Bilateral PNA vs Atelectasis   - Started Empirically on Vanco, Aztreonam   - Blood cx: NGTD   - urine culture: negative   - Sputum cx + Pseudomonas aeruginosa, S. aureus  - Airway Clearance: Duoneb, Hypersal, Chest PT, PRN suctioning   - Maintain 02 sat > 92% Found w/ Bilateral PNA vs Atelectasis, and Possible OM Sacrum   S/p Fevers at home, Afebrile since admission   - S/p Chest CT (10/28):  c/w Bilateral PNA vs Atelectasis   - Started Empirically on Vanco, Aztreonam   - Blood cx: NGTD   - urine culture: negative   - Sputum cx + Pseudomonas aeruginosa, S. aureus  - Sputum Cx 11/6: + PSA and Serratia, sensitivities pending   - Airway Clearance: Duoneb, Hypersal, Chest PT, PRN suctioning   - Maintain 02 sat > 92%

## 2023-11-09 LAB
-  AMOXICILLIN/CLAVULANIC ACID: SIGNIFICANT CHANGE UP
-  AMOXICILLIN/CLAVULANIC ACID: SIGNIFICANT CHANGE UP
-  AMPICILLIN/SULBACTAM: SIGNIFICANT CHANGE UP
-  AMPICILLIN/SULBACTAM: SIGNIFICANT CHANGE UP
-  AMPICILLIN: SIGNIFICANT CHANGE UP
-  AMPICILLIN: SIGNIFICANT CHANGE UP
-  AZTREONAM: SIGNIFICANT CHANGE UP
-  AZTREONAM: SIGNIFICANT CHANGE UP
-  CEFAZOLIN: SIGNIFICANT CHANGE UP
-  CEFAZOLIN: SIGNIFICANT CHANGE UP
-  CEFEPIME: SIGNIFICANT CHANGE UP
-  CEFEPIME: SIGNIFICANT CHANGE UP
-  CEFOXITIN: SIGNIFICANT CHANGE UP
-  CEFOXITIN: SIGNIFICANT CHANGE UP
-  CEFTRIAXONE: SIGNIFICANT CHANGE UP
-  CEFTRIAXONE: SIGNIFICANT CHANGE UP
-  CIPROFLOXACIN: SIGNIFICANT CHANGE UP
-  CIPROFLOXACIN: SIGNIFICANT CHANGE UP
-  ERTAPENEM: SIGNIFICANT CHANGE UP
-  ERTAPENEM: SIGNIFICANT CHANGE UP
-  GENTAMICIN: SIGNIFICANT CHANGE UP
-  GENTAMICIN: SIGNIFICANT CHANGE UP
-  LEVOFLOXACIN: SIGNIFICANT CHANGE UP
-  LEVOFLOXACIN: SIGNIFICANT CHANGE UP
-  MEROPENEM: SIGNIFICANT CHANGE UP
-  MEROPENEM: SIGNIFICANT CHANGE UP
-  PIPERACILLIN/TAZOBACTAM: SIGNIFICANT CHANGE UP
-  PIPERACILLIN/TAZOBACTAM: SIGNIFICANT CHANGE UP
-  TOBRAMYCIN: SIGNIFICANT CHANGE UP
-  TOBRAMYCIN: SIGNIFICANT CHANGE UP
-  TRIMETHOPRIM/SULFAMETHOXAZOLE: SIGNIFICANT CHANGE UP
-  TRIMETHOPRIM/SULFAMETHOXAZOLE: SIGNIFICANT CHANGE UP
ANION GAP SERPL CALC-SCNC: 9 MMOL/L — SIGNIFICANT CHANGE UP (ref 5–17)
ANION GAP SERPL CALC-SCNC: 9 MMOL/L — SIGNIFICANT CHANGE UP (ref 5–17)
BUN SERPL-MCNC: 21 MG/DL — SIGNIFICANT CHANGE UP (ref 7–23)
BUN SERPL-MCNC: 21 MG/DL — SIGNIFICANT CHANGE UP (ref 7–23)
CALCIUM SERPL-MCNC: 9.4 MG/DL — SIGNIFICANT CHANGE UP (ref 8.4–10.5)
CALCIUM SERPL-MCNC: 9.4 MG/DL — SIGNIFICANT CHANGE UP (ref 8.4–10.5)
CHLORIDE SERPL-SCNC: 101 MMOL/L — SIGNIFICANT CHANGE UP (ref 96–108)
CHLORIDE SERPL-SCNC: 101 MMOL/L — SIGNIFICANT CHANGE UP (ref 96–108)
CO2 SERPL-SCNC: 25 MMOL/L — SIGNIFICANT CHANGE UP (ref 22–31)
CO2 SERPL-SCNC: 25 MMOL/L — SIGNIFICANT CHANGE UP (ref 22–31)
CREAT SERPL-MCNC: <0.3 MG/DL — LOW (ref 0.5–1.3)
CREAT SERPL-MCNC: <0.3 MG/DL — LOW (ref 0.5–1.3)
EGFR: 113 ML/MIN/1.73M2 — SIGNIFICANT CHANGE UP
EGFR: 113 ML/MIN/1.73M2 — SIGNIFICANT CHANGE UP
GLUCOSE BLDC GLUCOMTR-MCNC: 134 MG/DL — HIGH (ref 70–99)
GLUCOSE BLDC GLUCOMTR-MCNC: 134 MG/DL — HIGH (ref 70–99)
GLUCOSE BLDC GLUCOMTR-MCNC: 156 MG/DL — HIGH (ref 70–99)
GLUCOSE BLDC GLUCOMTR-MCNC: 156 MG/DL — HIGH (ref 70–99)
GLUCOSE BLDC GLUCOMTR-MCNC: 164 MG/DL — HIGH (ref 70–99)
GLUCOSE BLDC GLUCOMTR-MCNC: 164 MG/DL — HIGH (ref 70–99)
GLUCOSE BLDC GLUCOMTR-MCNC: 180 MG/DL — HIGH (ref 70–99)
GLUCOSE BLDC GLUCOMTR-MCNC: 180 MG/DL — HIGH (ref 70–99)
GLUCOSE BLDC GLUCOMTR-MCNC: 218 MG/DL — HIGH (ref 70–99)
GLUCOSE SERPL-MCNC: 133 MG/DL — HIGH (ref 70–99)
GLUCOSE SERPL-MCNC: 133 MG/DL — HIGH (ref 70–99)
HCT VFR BLD CALC: 30 % — LOW (ref 34.5–45)
HCT VFR BLD CALC: 30 % — LOW (ref 34.5–45)
HGB BLD-MCNC: 8.6 G/DL — LOW (ref 11.5–15.5)
HGB BLD-MCNC: 8.6 G/DL — LOW (ref 11.5–15.5)
MAGNESIUM SERPL-MCNC: 1.9 MG/DL — SIGNIFICANT CHANGE UP (ref 1.6–2.6)
MAGNESIUM SERPL-MCNC: 1.9 MG/DL — SIGNIFICANT CHANGE UP (ref 1.6–2.6)
MCHC RBC-ENTMCNC: 28.1 PG — SIGNIFICANT CHANGE UP (ref 27–34)
MCHC RBC-ENTMCNC: 28.1 PG — SIGNIFICANT CHANGE UP (ref 27–34)
MCHC RBC-ENTMCNC: 28.7 GM/DL — LOW (ref 32–36)
MCHC RBC-ENTMCNC: 28.7 GM/DL — LOW (ref 32–36)
MCV RBC AUTO: 98 FL — SIGNIFICANT CHANGE UP (ref 80–100)
MCV RBC AUTO: 98 FL — SIGNIFICANT CHANGE UP (ref 80–100)
METHOD TYPE: SIGNIFICANT CHANGE UP
METHOD TYPE: SIGNIFICANT CHANGE UP
NRBC # BLD: 0 /100 WBCS — SIGNIFICANT CHANGE UP (ref 0–0)
NRBC # BLD: 0 /100 WBCS — SIGNIFICANT CHANGE UP (ref 0–0)
PHOSPHATE SERPL-MCNC: 3.6 MG/DL — SIGNIFICANT CHANGE UP (ref 2.5–4.5)
PHOSPHATE SERPL-MCNC: 3.6 MG/DL — SIGNIFICANT CHANGE UP (ref 2.5–4.5)
PLATELET # BLD AUTO: 121 K/UL — LOW (ref 150–400)
PLATELET # BLD AUTO: 121 K/UL — LOW (ref 150–400)
POTASSIUM SERPL-MCNC: 4 MMOL/L — SIGNIFICANT CHANGE UP (ref 3.5–5.3)
POTASSIUM SERPL-MCNC: 4 MMOL/L — SIGNIFICANT CHANGE UP (ref 3.5–5.3)
POTASSIUM SERPL-SCNC: 4 MMOL/L — SIGNIFICANT CHANGE UP (ref 3.5–5.3)
POTASSIUM SERPL-SCNC: 4 MMOL/L — SIGNIFICANT CHANGE UP (ref 3.5–5.3)
RBC # BLD: 3.06 M/UL — LOW (ref 3.8–5.2)
RBC # BLD: 3.06 M/UL — LOW (ref 3.8–5.2)
RBC # FLD: 19.6 % — HIGH (ref 10.3–14.5)
RBC # FLD: 19.6 % — HIGH (ref 10.3–14.5)
SODIUM SERPL-SCNC: 135 MMOL/L — SIGNIFICANT CHANGE UP (ref 135–145)
SODIUM SERPL-SCNC: 135 MMOL/L — SIGNIFICANT CHANGE UP (ref 135–145)
TSH SERPL-MCNC: 8.9 UIU/ML — HIGH (ref 0.27–4.2)
WBC # BLD: 6.74 K/UL — SIGNIFICANT CHANGE UP (ref 3.8–10.5)
WBC # BLD: 6.74 K/UL — SIGNIFICANT CHANGE UP (ref 3.8–10.5)
WBC # FLD AUTO: 6.74 K/UL — SIGNIFICANT CHANGE UP (ref 3.8–10.5)
WBC # FLD AUTO: 6.74 K/UL — SIGNIFICANT CHANGE UP (ref 3.8–10.5)

## 2023-11-09 PROCEDURE — 99233 SBSQ HOSP IP/OBS HIGH 50: CPT

## 2023-11-09 RX ORDER — QUETIAPINE FUMARATE 200 MG/1
12.5 TABLET, FILM COATED ORAL
Refills: 0 | Status: DISCONTINUED | OUTPATIENT
Start: 2023-11-09 | End: 2023-11-09

## 2023-11-09 RX ORDER — ACETAMINOPHEN 500 MG
1000 TABLET ORAL EVERY 6 HOURS
Refills: 0 | Status: DISCONTINUED | OUTPATIENT
Start: 2023-11-09 | End: 2024-01-11

## 2023-11-09 RX ORDER — QUETIAPINE FUMARATE 200 MG/1
12.5 TABLET, FILM COATED ORAL
Refills: 0 | Status: DISCONTINUED | OUTPATIENT
Start: 2023-11-09 | End: 2023-11-21

## 2023-11-09 RX ORDER — OXYCODONE HYDROCHLORIDE 5 MG/1
2.5 TABLET ORAL EVERY 6 HOURS
Refills: 0 | Status: DISCONTINUED | OUTPATIENT
Start: 2023-11-09 | End: 2023-11-15

## 2023-11-09 RX ADMIN — Medication 2 DROP(S): at 05:21

## 2023-11-09 RX ADMIN — Medication 5 MILLIGRAM(S): at 11:39

## 2023-11-09 RX ADMIN — Medication 5 MILLIGRAM(S): at 05:21

## 2023-11-09 RX ADMIN — LIDOCAINE 1 PATCH: 4 CREAM TOPICAL at 00:13

## 2023-11-09 RX ADMIN — ZINC SULFATE TAB 220 MG (50 MG ZINC EQUIVALENT) 220 MILLIGRAM(S): 220 (50 ZN) TAB at 11:37

## 2023-11-09 RX ADMIN — SIMETHICONE 80 MILLIGRAM(S): 80 TABLET, CHEWABLE ORAL at 17:43

## 2023-11-09 RX ADMIN — CHLORHEXIDINE GLUCONATE 1 APPLICATION(S): 213 SOLUTION TOPICAL at 05:27

## 2023-11-09 RX ADMIN — POLYETHYLENE GLYCOL 3350 17 GRAM(S): 17 POWDER, FOR SOLUTION ORAL at 05:21

## 2023-11-09 RX ADMIN — SIMETHICONE 80 MILLIGRAM(S): 80 TABLET, CHEWABLE ORAL at 23:55

## 2023-11-09 RX ADMIN — SIMETHICONE 80 MILLIGRAM(S): 80 TABLET, CHEWABLE ORAL at 00:04

## 2023-11-09 RX ADMIN — Medication 15 MILLILITER(S): at 11:36

## 2023-11-09 RX ADMIN — CHLORHEXIDINE GLUCONATE 15 MILLILITER(S): 213 SOLUTION TOPICAL at 17:34

## 2023-11-09 RX ADMIN — Medication 2: at 00:05

## 2023-11-09 RX ADMIN — CEFEPIME 100 MILLIGRAM(S): 1 INJECTION, POWDER, FOR SOLUTION INTRAMUSCULAR; INTRAVENOUS at 05:26

## 2023-11-09 RX ADMIN — CEFEPIME 100 MILLIGRAM(S): 1 INJECTION, POWDER, FOR SOLUTION INTRAMUSCULAR; INTRAVENOUS at 22:03

## 2023-11-09 RX ADMIN — LIDOCAINE 1 PATCH: 4 CREAM TOPICAL at 11:38

## 2023-11-09 RX ADMIN — Medication 4: at 12:05

## 2023-11-09 RX ADMIN — Medication 1000 MILLIGRAM(S): at 12:41

## 2023-11-09 RX ADMIN — CEFEPIME 100 MILLIGRAM(S): 1 INJECTION, POWDER, FOR SOLUTION INTRAMUSCULAR; INTRAVENOUS at 13:28

## 2023-11-09 RX ADMIN — Medication 2 DROP(S): at 11:37

## 2023-11-09 RX ADMIN — SIMETHICONE 80 MILLIGRAM(S): 80 TABLET, CHEWABLE ORAL at 11:37

## 2023-11-09 RX ADMIN — Medication 100 MICROGRAM(S): at 04:42

## 2023-11-09 RX ADMIN — Medication 1000 MILLIGRAM(S): at 13:11

## 2023-11-09 RX ADMIN — INSULIN GLARGINE 14 UNIT(S): 100 INJECTION, SOLUTION SUBCUTANEOUS at 16:01

## 2023-11-09 RX ADMIN — Medication 1 TABLET(S): at 05:27

## 2023-11-09 RX ADMIN — SIMETHICONE 80 MILLIGRAM(S): 80 TABLET, CHEWABLE ORAL at 05:21

## 2023-11-09 RX ADMIN — Medication 2 DROP(S): at 00:04

## 2023-11-09 RX ADMIN — Medication 2 DROP(S): at 17:34

## 2023-11-09 RX ADMIN — ENOXAPARIN SODIUM 40 MILLIGRAM(S): 100 INJECTION SUBCUTANEOUS at 04:42

## 2023-11-09 RX ADMIN — ESCITALOPRAM OXALATE 10 MILLIGRAM(S): 10 TABLET, FILM COATED ORAL at 11:37

## 2023-11-09 RX ADMIN — Medication 500 MILLIGRAM(S): at 11:38

## 2023-11-09 RX ADMIN — CHLORHEXIDINE GLUCONATE 15 MILLILITER(S): 213 SOLUTION TOPICAL at 05:21

## 2023-11-09 RX ADMIN — Medication 1 TABLET(S): at 11:38

## 2023-11-09 RX ADMIN — PANTOPRAZOLE SODIUM 40 MILLIGRAM(S): 20 TABLET, DELAYED RELEASE ORAL at 11:37

## 2023-11-09 RX ADMIN — LIDOCAINE 1 PATCH: 4 CREAM TOPICAL at 18:09

## 2023-11-09 RX ADMIN — Medication 1 TABLET(S): at 17:43

## 2023-11-09 RX ADMIN — Medication 2: at 23:54

## 2023-11-09 RX ADMIN — GABAPENTIN 100 MILLIGRAM(S): 400 CAPSULE ORAL at 22:03

## 2023-11-09 RX ADMIN — QUETIAPINE FUMARATE 12.5 MILLIGRAM(S): 200 TABLET, FILM COATED ORAL at 15:12

## 2023-11-09 NOTE — PROGRESS NOTE ADULT - PROBLEM SELECTOR PLAN 6
-Continue home Prednisone regimen  *fibromyalgia  -continue home Gabapentin 100mg daily  -continue home Fentanyl patches  - added Lidocaine patch   - added Percocet q6 hrs PRN (in addition to Tylenol PRN) -Continue home Prednisone regimen  *fibromyalgia  -continue home Gabapentin 100mg daily  -continue home Fentanyl patches  - added Lidocaine patch   - Oxycodone 2.5mg q6 hrs PRN (in addition to Tylenol PRN)

## 2023-11-09 NOTE — PROGRESS NOTE ADULT - NS ATTEND AMEND GEN_ALL_CORE FT
73F w/ DM, HTN, HLD, anemia, hypothyroidism, RA, fibromyalgia, remote cerebral aneursym repair, hypercapnic respiratory failure s/p tracheostomy/PEG, bedbound, sacral pressure ulcer. Admitted w/ bleeding from tracheostomy and PEG w/ associated abdominal pain, recent hx of auditory/visual hallucinations. Since admission, no further bleeding noted from either trach or PEG. Imaging showed mild thickening along PEG tube tract and sacral ulcer extending to coccyx suggestive of OM.     MRI with osteomyelitis  Repeat sputum now with serratia, increased secretions back on abx.     # Osteomyelitis  # Chronic hypoxemic RF  # oropharyngeal dysphagia  # acute on chronic pain  # Trach/Peg bleeding  # Tracheitis with PA and serratia  # Hx of hallucaination, anxiety  - MRI showing Osteo, on abx per ID, c/w wound care, no plans for surgery. Will need prolonged course of abx  - C/W vent, adjust as needed, will need vent on discharge  - Repeat sputum culture, with PA and serratia ,PA maybe colonization but serratia appers new. Will restart cefepime. Will f/u with ID sensitivity.   - family requested S/S eval but unable to given dependence on vent  - C/W pain control, multifactorial 2/2 to fibromyalgia as well as now osteo with pressure ulcer. Will decrease dose of pain meds.   - Peg irritation seen by GI, no revs for further interventions. H/H stable  - Seen by  for delirium and hallucinations. recommend Seroquel for delirium. Family requesting re-eval, was told psych would follow up. Will reconsult when patient is able to participate. Will decrease Seroquel as patient is sedated during the day.   - DVT ppx- lovenox  - Dispo- full code. Pending preparation to discharge home on vent.

## 2023-11-09 NOTE — PROGRESS NOTE ADULT - ASSESSMENT
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 Bleeding from gastrostomy. Site appears macerated. Multifactorial, possible the PEG has been too tight at home.  -PEG c/d/i   -no further bleeding  -wound care following    2. Leakage at PEG site.   -no further leaking     3. Abdominal pain. CT negative for acute pathology.  -miralax BID    4. Respiratory   per RCU     The plan of care was discussed with the physician assistant and modifications were made to the notation where appropriate.   Differential diagnosis and plan of care discussed with patient after the evaluation  35 minutes spent on total encounter of which more than fifty percent of the encounter was spent counseling and/or coordinating care by the attending physician.    [facility]  Andrew Morgan M.D.   [address] [place], [place]  Office: [phone]  Cell: [phone]

## 2023-11-09 NOTE — PROGRESS NOTE ADULT - PROBLEM SELECTOR PLAN 2
On chronic vent with trach  Lethargy: Probably from seroquel in combination with oxycodone. Minimize use of sedating meds.

## 2023-11-09 NOTE — PROGRESS NOTE ADULT - PROBLEM SELECTOR PLAN 2
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   - S/p CT abd/pelvis (10/28): Sacral decubitus ulcer extending to the coccyx with osseous remodeling and pelvic MRI or bone scan to recc to exclude osteomyelitis.  - MRI pelvis 10/30 with Osteomyelitis, c/w current Cefepime for 7 days, Vancomycin d/marli   - Elevated, ESR 85   - Elevated,    - Wound care on board  - Continue Cipro 500mg q 12 and Keflex 500mg q 6 until 12/10. 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   - S/p CT abd/pelvis (10/28): Sacral decubitus ulcer extending to the coccyx with osseous remodeling and pelvic MRI or bone scan to recc to exclude osteomyelitis.  - MRI pelvis 10/30 with Osteomyelitis, c/w current Cefepime for 7 days, Vancomycin d/marli   - Elevated, ESR 85   - Elevated,    - Wound care on board  -11/9:  Following acute infection,  will resume Cipro 500mg q 12 and Keflex 500mg q 6 until 12/10.

## 2023-11-09 NOTE — PROGRESS NOTE ADULT - SUBJECTIVE AND OBJECTIVE BOX
Patient is a 73y old  Female who presents with a chief complaint of     HPI:  11/9/2023    Appears lethargic, sleepy  On Mech vent. Afebrile, no distress noted.    PAST MEDICAL & SURGICAL HISTORY:  Diabetes      Rheumatoid arthritis      Fibromyalgia      Hypothyroid      Hypertension      H/O tracheostomy      PEG (percutaneous endoscopic gastrostomy) status          ALLERY AND IMMUNOLOGIC: No hives or eczema    Allergies    penicillin (Unknown)  heparin (Unknown)  Tagamet (Unknown)  pineapple (Unknown)  walnut (Unknown)  Pecan, Filbert, Hazelnut (Unknown)  Lyrica (Unknown)    Intolerances        Social History:     FAMILY HISTORY:      MEDICATIONS  (STANDING):  acetaminophen   IVPB .. 1000 milliGRAM(s) IV Intermittent once  acetylcysteine 10%  Inhalation 4 milliLiter(s) Inhalation once  albuterol/ipratropium for Nebulization 3 milliLiter(s) Nebulizer every 6 hours  artificial  tears Solution 2 Drop(s) Both EYES every 6 hours  aztreonam  IVPB      aztreonam  IVPB 1000 milliGRAM(s) IV Intermittent every 8 hours  calcium carbonate    500 mG (Tums) Chewable 1 Tablet(s) Chew every 12 hours  chlorhexidine 0.12% Liquid 15 milliLiter(s) Oral Mucosa every 12 hours  chlorhexidine 4% Liquid 1 Application(s) Topical <User Schedule>  dextrose 50% Injectable 50 milliLiter(s) IV Push once  escitalopram 10 milliGRAM(s) Oral daily  fentaNYL   Patch  25 MICROgram(s)/Hr 1 Patch Transdermal every 72 hours  gabapentin Solution 100 milliGRAM(s) Enteral Tube at bedtime  insulin lispro (ADMELOG) corrective regimen sliding scale   SubCutaneous every 6 hours  levothyroxine 100 MICROGram(s) Enteral Tube daily  multivitamin/minerals/iron Oral Solution (CENTRUM) 15 milliLiter(s) Oral daily  pantoprazole   Suspension 40 milliGRAM(s) Enteral Tube daily  predniSONE   Tablet 5 milliGRAM(s) Oral daily  QUEtiapine 12.5 milliGRAM(s) Oral at bedtime  simethicone 80 milliGRAM(s) Chew four times a day  sodium chloride 3%  Inhalation 4 milliLiter(s) Inhalation every 6 hours  vancomycin  IVPB 1000 milliGRAM(s) IV Intermittent every 12 hours  zinc sulfate 220 milliGRAM(s) Oral daily    MEDICATIONS  (PRN):  sodium chloride 0.65% Nasal 2 Spray(s) Both Nostrils two times a day PRN Nasal Congestion          I&O's Summary    27 Oct 2023 07:01  -  28 Oct 2023 07:00  --------------------------------------------------------  IN: 200 mL / OUT: 400 mL / NET: -200 mL    28 Oct 2023 07:01  -  28 Oct 2023 19:00  --------------------------------------------------------  IN: 710 mL / OUT: 0 mL / NET: 710 mL        PHYSICAL EXAM:  Vital Signs Last 24 Hrs  T(C): 37.3 (09 Nov 2023 11:53), Max: 37.6 (08 Nov 2023 19:05)  T(F): 99.1 (09 Nov 2023 11:53), Max: 99.6 (08 Nov 2023 19:05)  HR: 67 (09 Nov 2023 11:53) (65 - 72)  BP: 141/63 (09 Nov 2023 11:53) (141/60 - 154/71)  BP(mean): --  RR: 18 (09 Nov 2023 11:53) (12 - 20)  SpO2: 100% (09 Nov 2023 11:53) (98% - 100%)    Parameters below as of 09 Nov 2023 11:53  Patient On (Oxygen Delivery Method): ventilator        GENERAL: NAD, well-developed  HEAD:  Atraumatic, Normocephalic  EYES: EOMI, PERRLA, conjunctiva and sclera clear  NECK: Supple, No JVD. Trach in place, connected to mech vent  CHEST/LUNG: Clear to auscultation bilaterally; No wheeze  HEART: Regular rate and rhythm; No murmurs, rubs, or gallops  ABDOMEN: Soft, tender, Nondistended; Bowel sounds present PEG in place  EXTREMITIES:  2+ Peripheral Pulses, No clubbing, cyanosis, or edema  PSYCH: AAOx3  NEUROLOGY: Bed bound, functionally quadriplegic  SKIN: No rashes or lesions    LABS:                                 8.6    6.74  )-----------( 121      ( 09 Nov 2023 06:58 )             30.0   11-09    135  |  101  |  21  ----------------------------<  133<H>  4.0   |  25  |  <0.30<L>    Ca    9.4      09 Nov 2023 06:58  Phos  3.6     11-09  Mg     1.9     11-09                 8.5    6.07  )-----------( 117      ( 28 Oct 2023 07:19 )             28.4     10-28    135  |  100  |  16  ----------------------------<  77  3.3<L>   |  27  |  <0.30<L>    Ca    8.3<L>      28 Oct 2023 07:19  Phos  3.2     10-28  Mg     1.9     10-28    TPro  6.7  /  Alb  3.0<L>  /  TBili  0.2  /  DBili  x   /  AST  21  /  ALT  23  /  AlkPhos  47  10-28    PT/INR - ( 27 Oct 2023 14:39 )   PT: 12.3 sec;   INR: 1.12 ratio         PTT - ( 27 Oct 2023 14:39 )  PTT:30.8 sec      Urinalysis Basic - ( 28 Oct 2023 07:19 )    Color: x / Appearance: x / SG: x / pH: x  Gluc: 77 mg/dL / Ketone: x  / Bili: x / Urobili: x   Blood: x / Protein: x / Nitrite: x   Leuk Esterase: x / RBC: x / WBC x   Sq Epi: x / Non Sq Epi: x / Bacteria: x        RADIOLOGY & ADDITIONAL TESTS:    Imaging Personally Reviewed:    Consultant(s) Notes Reviewed:      Care Discussed with Consultants/Other Providers:

## 2023-11-09 NOTE — PROGRESS NOTE ADULT - PROBLEM SELECTOR PLAN 1
Found w/ Bilateral PNA vs Atelectasis, and Possible OM Sacrum   S/p Fevers at home, Afebrile since admission   - S/p Chest CT (10/28):  c/w Bilateral PNA vs Atelectasis   - Started Empirically on Vanco, Aztreonam   - Blood cx: NGTD   - urine culture: negative   - Sputum cx + Pseudomonas aeruginosa, S. aureus  - Sputum Cx 11/6: + PSA and Serratia, sensitivities pending   - Airway Clearance: Duoneb, Hypersal, Chest PT, PRN suctioning   - Maintain 02 sat > 92% Found w/ Bilateral PNA vs Atelectasis, and Possible OM Sacrum   S/p Fevers at home, Afebrile since admission   - S/p Chest CT (10/28):  c/w Bilateral PNA vs Atelectasis   - Started Empirically on Vanco, Aztreonam   - Blood cx: NGTD   - urine culture: negative   - Sputum cx + Pseudomonas aeruginosa, S. aureus  - Sputum Cx 11/6: + PSA and Serratia, sensitivities pending   - Cefepime 2GM Q8H started 11/8 -->?

## 2023-11-09 NOTE — PROGRESS NOTE ADULT - SUBJECTIVE AND OBJECTIVE BOX
INTERVAL HPI/OVERNIGHT EVENTS:    tolerating peg feeds  no constipation     MEDICATIONS  (STANDING):  artificial  tears Solution 2 Drop(s) Both EYES every 6 hours  ascorbic acid 500 milliGRAM(s) Oral daily  calcium carbonate    500 mG (Tums) Chewable 1 Tablet(s) Chew every 12 hours  cephalexin 500 milliGRAM(s) Oral every 6 hours  chlorhexidine 0.12% Liquid 15 milliLiter(s) Oral Mucosa every 12 hours  chlorhexidine 4% Liquid 1 Application(s) Topical <User Schedule>  ciprofloxacin     Tablet 500 milliGRAM(s) Oral every 12 hours  dextrose 50% Injectable 50 milliLiter(s) IV Push once  enoxaparin Injectable 40 milliGRAM(s) SubCutaneous every 24 hours  escitalopram 10 milliGRAM(s) Oral daily  fentaNYL   Patch  25 MICROgram(s)/Hr 1 Patch Transdermal every 72 hours  gabapentin Solution 100 milliGRAM(s) Enteral Tube at bedtime  insulin glargine Injectable (LANTUS) 14 Unit(s) SubCutaneous every morning  insulin lispro (ADMELOG) corrective regimen sliding scale   SubCutaneous every 6 hours  lactobacillus acidophilus 1 Tablet(s) Oral daily  levothyroxine 100 MICROGram(s) Enteral Tube <User Schedule>  lidocaine   4% Patch 1 Patch Transdermal daily  multivitamin/minerals/iron Oral Solution (CENTRUM) 15 milliLiter(s) Oral daily  oxybutynin 5 milliGRAM(s) Oral daily  pantoprazole   Suspension 40 milliGRAM(s) Enteral Tube daily  polyethylene glycol 3350 17 Gram(s) Oral two times a day  predniSONE   Tablet 5 milliGRAM(s) Oral daily  QUEtiapine 12.5 milliGRAM(s) Oral <User Schedule>  simethicone 80 milliGRAM(s) Chew four times a day  zinc sulfate 220 milliGRAM(s) Oral daily    MEDICATIONS  (PRN):  acetaminophen   Oral Liquid .. 650 milliGRAM(s) Oral every 6 hours PRN Temp greater or equal to 38C (100.4F), mild Pain (1-3)  albuterol/ipratropium for Nebulization 3 milliLiter(s) Nebulizer every 6 hours PRN Bronchospasm  diphenhydrAMINE Elixir 25 milliGRAM(s) Oral every 6 hours PRN Rash and/or Itching  oxyCODONE    Solution 5 milliGRAM(s) Oral every 6 hours PRN Moderate Pain (4 - 6)  sodium chloride 0.65% Nasal 2 Spray(s) Both Nostrils two times a day PRN Nasal Congestion      Allergies    penicillin (Unknown)  heparin (Unknown)  Tagamet (Unknown)  pineapple (Unknown)  walnut (Unknown)  Pecan, Filbert, Hazelnut (Unknown)  Lyrica (Unknown)    Intolerances        Review of Systems:  unable to obtain    Vital Signs Last 24 Hrs  T(C): 37.6 (09 Nov 2023 14:03), Max: 38.2 (09 Nov 2023 12:45)  T(F): 99.6 (09 Nov 2023 14:03), Max: 100.8 (09 Nov 2023 12:45)  HR: 67 (09 Nov 2023 11:53) (66 - 72)  BP: 141/63 (09 Nov 2023 11:53) (141/60 - 144/68)  BP(mean): --  RR: 18 (09 Nov 2023 11:53) (12 - 20)  SpO2: 100% (09 Nov 2023 11:53) (98% - 100%)    Parameters below as of 09 Nov 2023 11:53  Patient On (Oxygen Delivery Method): ventilator            PHYSICAL EXAM:    Constitutional: NAD  HEENT: EOMI, throat clear  Neck: No LAD, supple  Respiratory: CTA and P  Cardiovascular: S1 and S2, RRR, no M  Gastrointestinal: BS+, soft, NT/ND, neg HSM, +peg c/d/i  Extremities: No peripheral edema, neg clubbing, cyanosis  Vascular: 2+ peripheral pulses  Neurological: A/O   Psychiatric: Normal mood, normal affect  Skin: No rashes      LABS:  LABS:                        8.6    6.74  )-----------( 121      ( 09 Nov 2023 06:58 )             30.0     11-09    135  |  101  |  21  ----------------------------<  133<H>  4.0   |  25  |  <0.30<L>    Ca    9.4      09 Nov 2023 06:58  Phos  3.6     11-09  Mg     1.9     11-09        Urinalysis Basic - ( 09 Nov 2023 06:58 )    Color: x / Appearance: x / SG: x / pH: x  Gluc: 133 mg/dL / Ketone: x  / Bili: x / Urobili: x   Blood: x / Protein: x / Nitrite: x   Leuk Esterase: x / RBC: x / WBC x   Sq Epi: x / Non Sq Epi: x / Bacteria: x                  RADIOLOGY & ADDITIONAL TESTS:

## 2023-11-09 NOTE — PROGRESS NOTE ADULT - SUBJECTIVE AND OBJECTIVE BOX
Patient is a 73y old  Female who presents with a chief complaint of 74 y/o F with PMHx of T2DM, HTN, HLD, anemia, hypothyroidism, RA, fibromyalgia, remote cerebral aneurysm repair, acute hypercapnic respiratory failure now with tracheostomy, PEG tube, and bedbound (trach change 8/18, PEG change 8/14), sacral pressure ulcer, BIBEMS from home for 6 day hx of bleeding from the tracheostomy and PEG tube with associated abdominal pain, recent history of auditory and visual hallucinations, and with chronic sacral decubitus ulcer.    (29 Oct 2023 14:01)      Interval Events:    REVIEW OF SYSTEMS:  [ ] Positive  [ ] All other systems negative  [ ] Unable to assess ROS because ________    Vital Signs Last 24 Hrs  T(C): 36.7 (11-09-23 @ 04:40), Max: 37.6 (11-08-23 @ 19:05)  T(F): 98 (11-09-23 @ 04:40), Max: 99.6 (11-08-23 @ 19:05)  HR: 72 (11-09-23 @ 04:40) (60 - 72)  BP: 141/60 (11-09-23 @ 04:40) (141/60 - 154/71)  RR: 13 (11-09-23 @ 04:40) (12 - 20)  SpO2: 100% (11-09-23 @ 04:40) (98% - 100%)    PHYSICAL EXAM:  HEENT:   [ ]Tracheostomy:  [ ]Pupils equal  [ ]No oral lesions  [ ]Abnormal    SKIN  [ ]No Rash  [ ] Abnormal  [ ] pressure    CARDIAC  [ ]Regular  [ ]Abnormal    PULMONARY  [ ]Bilateral Clear Breath Sounds  [ ]Normal Excursion  [ ]Abnormal    GI  [ ]PEG      [ ] +BS		              [ ]Soft, nondistended, nontender	  [ ]Abnormal    MUSCULOSKELETAL                                   [ ]Bedbound                 [ ]Abnormal    [ ]Ambulatory/OOB to chair                           EXTREMITIES                                         [ ]Normal  [ ]Edema                           NEUROLOGIC  [ ] Normal, non focal  [ ] Focal findings:    PSYCHIATRIC  [ ]Alert and appropriate  [ ] Sedated	 [ ]Agitated    :  Mcbride: [ ] Yes, if yes: Date of Placement:                   [  ] No    LINES: Central Lines [ ] Yes, if yes: Date of Placement                                     [  ] No    HOSPITAL MEDICATIONS:  MEDICATIONS  (STANDING):  artificial  tears Solution 2 Drop(s) Both EYES every 6 hours  ascorbic acid 500 milliGRAM(s) Oral daily  calcium carbonate    500 mG (Tums) Chewable 1 Tablet(s) Chew every 12 hours  cefepime   IVPB      cefepime   IVPB 2000 milliGRAM(s) IV Intermittent every 8 hours  chlorhexidine 0.12% Liquid 15 milliLiter(s) Oral Mucosa every 12 hours  chlorhexidine 4% Liquid 1 Application(s) Topical <User Schedule>  dextrose 50% Injectable 50 milliLiter(s) IV Push once  enoxaparin Injectable 40 milliGRAM(s) SubCutaneous every 24 hours  escitalopram 10 milliGRAM(s) Oral daily  fentaNYL   Patch  25 MICROgram(s)/Hr 1 Patch Transdermal every 72 hours  gabapentin Solution 100 milliGRAM(s) Enteral Tube at bedtime  insulin glargine Injectable (LANTUS) 14 Unit(s) SubCutaneous every morning  insulin lispro (ADMELOG) corrective regimen sliding scale   SubCutaneous every 6 hours  lactobacillus acidophilus 1 Tablet(s) Oral daily  levothyroxine 100 MICROGram(s) Enteral Tube <User Schedule>  lidocaine   4% Patch 1 Patch Transdermal daily  multivitamin/minerals/iron Oral Solution (CENTRUM) 15 milliLiter(s) Oral daily  oxybutynin 5 milliGRAM(s) Oral daily  pantoprazole   Suspension 40 milliGRAM(s) Enteral Tube daily  polyethylene glycol 3350 17 Gram(s) Oral two times a day  predniSONE   Tablet 5 milliGRAM(s) Oral daily  QUEtiapine 12.5 milliGRAM(s) Oral <User Schedule>  simethicone 80 milliGRAM(s) Chew four times a day  zinc sulfate 220 milliGRAM(s) Oral daily    MEDICATIONS  (PRN):  acetaminophen   Oral Liquid .. 650 milliGRAM(s) Oral every 6 hours PRN Temp greater or equal to 38C (100.4F), mild Pain (1-3)  albuterol/ipratropium for Nebulization 3 milliLiter(s) Nebulizer every 6 hours PRN Bronchospasm  diphenhydrAMINE Elixir 25 milliGRAM(s) Oral every 6 hours PRN Rash and/or Itching  oxyCODONE    Solution 5 milliGRAM(s) Oral every 6 hours PRN Moderate Pain (4 - 6)  sodium chloride 0.65% Nasal 2 Spray(s) Both Nostrils two times a day PRN Nasal Congestion      LABS:                        8.6    6.74  )-----------( 121      ( 09 Nov 2023 06:58 )             30.0     11-09    135  |  101  |  21  ----------------------------<  133<H>  4.0   |  25  |  <0.30<L>    Ca    9.4      09 Nov 2023 06:58  Phos  3.6     11-09  Mg     1.9     11-09        Urinalysis Basic - ( 09 Nov 2023 06:58 )    Color: x / Appearance: x / SG: x / pH: x  Gluc: 133 mg/dL / Ketone: x  / Bili: x / Urobili: x   Blood: x / Protein: x / Nitrite: x   Leuk Esterase: x / RBC: x / WBC x   Sq Epi: x / Non Sq Epi: x / Bacteria: x      Arterial Blood Gas:  11-07 @ 23:08  7.47/37/158/27/99.2/3.1  ABG lactate: --      CAPILLARY BLOOD GLUCOSE    MICROBIOLOGY:     RADIOLOGY:  [ ] Reviewed and interpreted by me    Mode: AC/ CMV (Assist Control/ Continuous Mandatory Ventilation)  RR (machine): 12  TV (machine): 300  FiO2: 30  PEEP: 5  ITime: 1  MAP: 9  PIP: 22   Patient is a 73y old  Female who presents with a chief complaint of 74 y/o F with PMHx of T2DM, HTN, HLD, anemia, hypothyroidism, RA, fibromyalgia, remote cerebral aneurysm repair, acute hypercapnic respiratory failure now with tracheostomy, PEG tube, and bedbound (trach change 8/18, PEG change 8/14), sacral pressure ulcer, BIBEMS from home for 6 day hx of bleeding from the tracheostomy and PEG tube with associated abdominal pain, recent history of auditory and visual hallucinations, and with chronic sacral decubitus ulcer.    (29 Oct 2023 14:01)      Interval Events: No events reported over night.     REVIEW OF SYSTEMS:  [X] Positive:  intermittent complaints of feeling short of breath  [ ] All other systems negative  [ ] Unable to assess ROS because ________    Vital Signs Last 24 Hrs  T(C): 36.7 (11-09-23 @ 04:40), Max: 37.6 (11-08-23 @ 19:05)  T(F): 98 (11-09-23 @ 04:40), Max: 99.6 (11-08-23 @ 19:05)  HR: 72 (11-09-23 @ 04:40) (60 - 72)  BP: 141/60 (11-09-23 @ 04:40) (141/60 - 154/71)  RR: 13 (11-09-23 @ 04:40) (12 - 20)  SpO2: 100% (11-09-23 @ 04:40) (98% - 100%)      PHYSICAL EXAM:  HEENT:   [X] Tracheostomy:  #8 Cuffed Portex  [X] 4mm PERRL B/L  [X] No oral lesions  [ ] Abnormal    SKIN  [ ] No Rash  [ ]  Abnormal  [X] pressure: Sacral wound    CARDIAC  [X] Regular  [ ] Abnormal    PULMONARY  [X] Bilateral Clear Breath Sounds  [ ] Normal Excursion  [ ] Abnormal    GI  [X] PEG      [X] +BS		              [X] Soft, nondistended, nontender	  [ ] Abnormal    MUSCULOSKELETAL                                   [  ] Bedbound                 [X] Abnormal:  Deconditioned but can partially move all limbs   [ ] Ambulatory/OOB to chair                           EXTREMITIES                                         [X] Normal  [ ]Edema                           NEUROLOGIC  [ ] Normal, non focal  [X] Focal findings:  Alert and Oriented x2; responds appropriately top questions by mouthing and able to phonation while on vent; B/L foot drop with partial ability to dorsiflex; moves both arms with minimal hand dexterity B/L    PSYCHIATRIC  [X] Alert; somewhat anxious at times but mostly appropriate  [ ] Sedated	 [ ]Agitated    :  Mcbride: [ ] Yes, if yes: Date of Placement:                   [X] No    LINES: Central Lines [ ] Yes, if yes: Date of Placement                                     [X] No    HOSPITAL MEDICATIONS:  MEDICATIONS  (STANDING):  artificial  tears Solution 2 Drop(s) Both EYES every 6 hours  ascorbic acid 500 milliGRAM(s) Oral daily  calcium carbonate    500 mG (Tums) Chewable 1 Tablet(s) Chew every 12 hours  cefepime   IVPB      cefepime   IVPB 2000 milliGRAM(s) IV Intermittent every 8 hours  chlorhexidine 0.12% Liquid 15 milliLiter(s) Oral Mucosa every 12 hours  chlorhexidine 4% Liquid 1 Application(s) Topical <User Schedule>  dextrose 50% Injectable 50 milliLiter(s) IV Push once  enoxaparin Injectable 40 milliGRAM(s) SubCutaneous every 24 hours  escitalopram 10 milliGRAM(s) Oral daily  fentaNYL   Patch  25 MICROgram(s)/Hr 1 Patch Transdermal every 72 hours  gabapentin Solution 100 milliGRAM(s) Enteral Tube at bedtime  insulin glargine Injectable (LANTUS) 14 Unit(s) SubCutaneous every morning  insulin lispro (ADMELOG) corrective regimen sliding scale   SubCutaneous every 6 hours  lactobacillus acidophilus 1 Tablet(s) Oral daily  levothyroxine 100 MICROGram(s) Enteral Tube <User Schedule>  lidocaine   4% Patch 1 Patch Transdermal daily  multivitamin/minerals/iron Oral Solution (CENTRUM) 15 milliLiter(s) Oral daily  oxybutynin 5 milliGRAM(s) Oral daily  pantoprazole   Suspension 40 milliGRAM(s) Enteral Tube daily  polyethylene glycol 3350 17 Gram(s) Oral two times a day  predniSONE   Tablet 5 milliGRAM(s) Oral daily  QUEtiapine 12.5 milliGRAM(s) Oral <User Schedule>  simethicone 80 milliGRAM(s) Chew four times a day  zinc sulfate 220 milliGRAM(s) Oral daily    MEDICATIONS  (PRN):  acetaminophen   Oral Liquid .. 650 milliGRAM(s) Oral every 6 hours PRN Temp greater or equal to 38C (100.4F), mild Pain (1-3)  albuterol/ipratropium for Nebulization 3 milliLiter(s) Nebulizer every 6 hours PRN Bronchospasm  diphenhydrAMINE Elixir 25 milliGRAM(s) Oral every 6 hours PRN Rash and/or Itching  oxyCODONE    Solution 5 milliGRAM(s) Oral every 6 hours PRN Moderate Pain (4 - 6)  sodium chloride 0.65% Nasal 2 Spray(s) Both Nostrils two times a day PRN Nasal Congestion      LABS:                        8.6    6.74  )-----------( 121      ( 09 Nov 2023 06:58 )             30.0     11-09    135  |  101  |  21  ----------------------------<  133<H>  4.0   |  25  |  <0.30<L>    Ca    9.4      09 Nov 2023 06:58  Phos  3.6     11-09  Mg     1.9     11-09        Urinalysis Basic - ( 09 Nov 2023 06:58 )    Color: x / Appearance: x / SG: x / pH: x  Gluc: 133 mg/dL / Ketone: x  / Bili: x / Urobili: x   Blood: x / Protein: x / Nitrite: x   Leuk Esterase: x / RBC: x / WBC x   Sq Epi: x / Non Sq Epi: x / Bacteria: x      Arterial Blood Gas:  11-07 @ 23:08  7.47/37/158/27/99.2/3.1  ABG lactate: --      CAPILLARY BLOOD GLUCOSE    MICROBIOLOGY:     RADIOLOGY:  [ ] Reviewed and interpreted by me    Mode: AC/ CMV (Assist Control/ Continuous Mandatory Ventilation)  RR (machine): 12  TV (machine): 300  FiO2: 30  PEEP: 5  ITime: 1  MAP: 9  PIP: 22

## 2023-11-09 NOTE — PROGRESS NOTE ADULT - PROBLEM SELECTOR PLAN 1
ID consulted  On Cefipime  MRI confirms acute osteomyelitis of sacrum.  Sputum culture showing Pseudomonas and serratia. Antibiotics to continue.

## 2023-11-09 NOTE — PROVIDER CONTACT NOTE (OTHER) - BACKGROUND
Pt called back BP-  Recent addition of lisinopril 5mg- pt tolerating no concerns - please advise if any further changes needed- pt did not record HR but will in furture  130/89   139/72  159/87  131/72  117/57  151/85  143/81  125/85  137/81       Pt admitted with hx of other condition

## 2023-11-10 LAB
-  AZTREONAM: SIGNIFICANT CHANGE UP
-  AZTREONAM: SIGNIFICANT CHANGE UP
-  CEFEPIME: SIGNIFICANT CHANGE UP
-  CEFEPIME: SIGNIFICANT CHANGE UP
-  CEFTAZIDIME: SIGNIFICANT CHANGE UP
-  CEFTAZIDIME: SIGNIFICANT CHANGE UP
-  CIPROFLOXACIN: SIGNIFICANT CHANGE UP
-  CIPROFLOXACIN: SIGNIFICANT CHANGE UP
-  IMIPENEM: SIGNIFICANT CHANGE UP
-  IMIPENEM: SIGNIFICANT CHANGE UP
-  LEVOFLOXACIN: SIGNIFICANT CHANGE UP
-  LEVOFLOXACIN: SIGNIFICANT CHANGE UP
-  MEROPENEM: SIGNIFICANT CHANGE UP
-  MEROPENEM: SIGNIFICANT CHANGE UP
-  PIPERACILLIN/TAZOBACTAM: SIGNIFICANT CHANGE UP
-  PIPERACILLIN/TAZOBACTAM: SIGNIFICANT CHANGE UP
APPEARANCE UR: CLEAR — SIGNIFICANT CHANGE UP
APPEARANCE UR: CLEAR — SIGNIFICANT CHANGE UP
BILIRUB UR-MCNC: NEGATIVE — SIGNIFICANT CHANGE UP
BILIRUB UR-MCNC: NEGATIVE — SIGNIFICANT CHANGE UP
COLOR SPEC: YELLOW — SIGNIFICANT CHANGE UP
COLOR SPEC: YELLOW — SIGNIFICANT CHANGE UP
CULTURE RESULTS: ABNORMAL
CULTURE RESULTS: ABNORMAL
DIFF PNL FLD: NEGATIVE — SIGNIFICANT CHANGE UP
DIFF PNL FLD: NEGATIVE — SIGNIFICANT CHANGE UP
GLUCOSE BLDC GLUCOMTR-MCNC: 161 MG/DL — HIGH (ref 70–99)
GLUCOSE BLDC GLUCOMTR-MCNC: 161 MG/DL — HIGH (ref 70–99)
GLUCOSE BLDC GLUCOMTR-MCNC: 220 MG/DL — HIGH (ref 70–99)
GLUCOSE BLDC GLUCOMTR-MCNC: 220 MG/DL — HIGH (ref 70–99)
GLUCOSE BLDC GLUCOMTR-MCNC: 227 MG/DL — HIGH (ref 70–99)
GLUCOSE BLDC GLUCOMTR-MCNC: 227 MG/DL — HIGH (ref 70–99)
GLUCOSE BLDC GLUCOMTR-MCNC: 99 MG/DL — SIGNIFICANT CHANGE UP (ref 70–99)
GLUCOSE BLDC GLUCOMTR-MCNC: 99 MG/DL — SIGNIFICANT CHANGE UP (ref 70–99)
GLUCOSE UR QL: NEGATIVE MG/DL — SIGNIFICANT CHANGE UP
GLUCOSE UR QL: NEGATIVE MG/DL — SIGNIFICANT CHANGE UP
KETONES UR-MCNC: NEGATIVE MG/DL — SIGNIFICANT CHANGE UP
KETONES UR-MCNC: NEGATIVE MG/DL — SIGNIFICANT CHANGE UP
LEUKOCYTE ESTERASE UR-ACNC: NEGATIVE — SIGNIFICANT CHANGE UP
LEUKOCYTE ESTERASE UR-ACNC: NEGATIVE — SIGNIFICANT CHANGE UP
METHOD TYPE: SIGNIFICANT CHANGE UP
METHOD TYPE: SIGNIFICANT CHANGE UP
NITRITE UR-MCNC: NEGATIVE — SIGNIFICANT CHANGE UP
NITRITE UR-MCNC: NEGATIVE — SIGNIFICANT CHANGE UP
ORGANISM # SPEC MICROSCOPIC CNT: ABNORMAL
PH UR: 7 — SIGNIFICANT CHANGE UP (ref 5–8)
PH UR: 7 — SIGNIFICANT CHANGE UP (ref 5–8)
PROT UR-MCNC: SIGNIFICANT CHANGE UP MG/DL
PROT UR-MCNC: SIGNIFICANT CHANGE UP MG/DL
SP GR SPEC: 1.01 — SIGNIFICANT CHANGE UP (ref 1–1.03)
SP GR SPEC: 1.01 — SIGNIFICANT CHANGE UP (ref 1–1.03)
SPECIMEN SOURCE: SIGNIFICANT CHANGE UP
SPECIMEN SOURCE: SIGNIFICANT CHANGE UP
T4 AB SER-ACNC: 4.3 UG/DL — LOW (ref 4.6–12)
T4 AB SER-ACNC: 4.3 UG/DL — LOW (ref 4.6–12)
T4 FREE SERPL-MCNC: 0.7 NG/DL — LOW (ref 0.9–1.8)
T4 FREE SERPL-MCNC: 0.7 NG/DL — LOW (ref 0.9–1.8)
TSH SERPL-MCNC: 7.9 UIU/ML — HIGH (ref 0.27–4.2)
TSH SERPL-MCNC: 7.9 UIU/ML — HIGH (ref 0.27–4.2)
UROBILINOGEN FLD QL: 0.2 MG/DL — SIGNIFICANT CHANGE UP (ref 0.2–1)
UROBILINOGEN FLD QL: 0.2 MG/DL — SIGNIFICANT CHANGE UP (ref 0.2–1)

## 2023-11-10 PROCEDURE — 99233 SBSQ HOSP IP/OBS HIGH 50: CPT

## 2023-11-10 PROCEDURE — 99232 SBSQ HOSP IP/OBS MODERATE 35: CPT

## 2023-11-10 RX ORDER — ACETAMINOPHEN 500 MG
20.3 TABLET ORAL
Refills: 0 | DISCHARGE

## 2023-11-10 RX ORDER — LEVOTHYROXINE SODIUM 125 MCG
1 TABLET ORAL
Qty: 0 | Refills: 0 | DISCHARGE

## 2023-11-10 RX ORDER — SODIUM CHLORIDE 0.65 %
1 AEROSOL, SPRAY (ML) NASAL THREE TIMES A DAY
Refills: 0 | Status: DISCONTINUED | OUTPATIENT
Start: 2023-11-10 | End: 2023-11-10

## 2023-11-10 RX ORDER — CYCLOSPORINE 0.5 MG/ML
1 EMULSION OPHTHALMIC
Qty: 0 | Refills: 0 | DISCHARGE

## 2023-11-10 RX ORDER — CHOLECALCIFEROL (VITAMIN D3) 125 MCG
0 CAPSULE ORAL
Qty: 0 | Refills: 0 | DISCHARGE

## 2023-11-10 RX ORDER — FLUTICASONE PROPIONATE 50 MCG
1 SPRAY, SUSPENSION NASAL
Refills: 0 | Status: DISCONTINUED | OUTPATIENT
Start: 2023-11-10 | End: 2023-11-10

## 2023-11-10 RX ORDER — NYSTATIN CREAM 100000 [USP'U]/G
1 CREAM TOPICAL EVERY 8 HOURS
Refills: 0 | Status: DISCONTINUED | OUTPATIENT
Start: 2023-11-10 | End: 2024-01-17

## 2023-11-10 RX ORDER — ACETAMINOPHEN 500 MG
0 TABLET ORAL
Refills: 0 | DISCHARGE

## 2023-11-10 RX ORDER — SODIUM CHLORIDE 0.65 %
1 AEROSOL, SPRAY (ML) NASAL EVERY 6 HOURS
Refills: 0 | Status: DISCONTINUED | OUTPATIENT
Start: 2023-11-10 | End: 2024-01-17

## 2023-11-10 RX ORDER — FOLIC ACID 0.8 MG
1 TABLET ORAL
Qty: 0 | Refills: 0 | DISCHARGE

## 2023-11-10 RX ORDER — CALCIUM ACETATE 667 MG
1000 TABLET ORAL
Qty: 0 | Refills: 0 | DISCHARGE

## 2023-11-10 RX ORDER — METFORMIN HYDROCHLORIDE 850 MG/1
1 TABLET ORAL
Qty: 0 | Refills: 0 | DISCHARGE

## 2023-11-10 RX ORDER — SOLIFENACIN SUCCINATE 10 MG/1
1 TABLET ORAL
Qty: 0 | Refills: 0 | DISCHARGE

## 2023-11-10 RX ORDER — MAGNESIUM OXIDE/MAG AA CHELATE 133 MG
400 TABLET ORAL
Qty: 0 | Refills: 0 | DISCHARGE

## 2023-11-10 RX ORDER — PREDNISOLONE SODIUM PHOSPHATE 1 %
1 DROPS OPHTHALMIC (EYE)
Qty: 0 | Refills: 0 | DISCHARGE

## 2023-11-10 RX ORDER — MULTIVIT-MIN/FERROUS GLUCONATE 9 MG/15 ML
25 LIQUID (ML) ORAL
Qty: 0 | Refills: 0 | DISCHARGE

## 2023-11-10 RX ADMIN — Medication 25 MILLIGRAM(S): at 15:40

## 2023-11-10 RX ADMIN — Medication 100 MICROGRAM(S): at 05:10

## 2023-11-10 RX ADMIN — SIMETHICONE 80 MILLIGRAM(S): 80 TABLET, CHEWABLE ORAL at 06:18

## 2023-11-10 RX ADMIN — Medication 1 TABLET(S): at 17:44

## 2023-11-10 RX ADMIN — Medication 5 MILLIGRAM(S): at 06:18

## 2023-11-10 RX ADMIN — CHLORHEXIDINE GLUCONATE 1 APPLICATION(S): 213 SOLUTION TOPICAL at 06:18

## 2023-11-10 RX ADMIN — SIMETHICONE 80 MILLIGRAM(S): 80 TABLET, CHEWABLE ORAL at 23:51

## 2023-11-10 RX ADMIN — INSULIN GLARGINE 14 UNIT(S): 100 INJECTION, SOLUTION SUBCUTANEOUS at 06:29

## 2023-11-10 RX ADMIN — NYSTATIN CREAM 1 APPLICATION(S): 100000 CREAM TOPICAL at 21:28

## 2023-11-10 RX ADMIN — Medication 2 DROP(S): at 12:31

## 2023-11-10 RX ADMIN — ZINC SULFATE TAB 220 MG (50 MG ZINC EQUIVALENT) 220 MILLIGRAM(S): 220 (50 ZN) TAB at 12:28

## 2023-11-10 RX ADMIN — Medication 1000 MILLIGRAM(S): at 21:36

## 2023-11-10 RX ADMIN — LIDOCAINE 1 PATCH: 4 CREAM TOPICAL at 23:48

## 2023-11-10 RX ADMIN — CEFEPIME 100 MILLIGRAM(S): 1 INJECTION, POWDER, FOR SOLUTION INTRAMUSCULAR; INTRAVENOUS at 06:18

## 2023-11-10 RX ADMIN — Medication 5 MILLIGRAM(S): at 12:28

## 2023-11-10 RX ADMIN — CHLORHEXIDINE GLUCONATE 15 MILLILITER(S): 213 SOLUTION TOPICAL at 06:18

## 2023-11-10 RX ADMIN — Medication 2: at 18:13

## 2023-11-10 RX ADMIN — Medication 1000 MILLIGRAM(S): at 22:10

## 2023-11-10 RX ADMIN — LIDOCAINE 1 PATCH: 4 CREAM TOPICAL at 00:01

## 2023-11-10 RX ADMIN — Medication 15 MILLILITER(S): at 12:29

## 2023-11-10 RX ADMIN — ENOXAPARIN SODIUM 40 MILLIGRAM(S): 100 INJECTION SUBCUTANEOUS at 05:09

## 2023-11-10 RX ADMIN — QUETIAPINE FUMARATE 12.5 MILLIGRAM(S): 200 TABLET, FILM COATED ORAL at 14:34

## 2023-11-10 RX ADMIN — SIMETHICONE 80 MILLIGRAM(S): 80 TABLET, CHEWABLE ORAL at 12:28

## 2023-11-10 RX ADMIN — Medication 1 SPRAY(S): at 15:40

## 2023-11-10 RX ADMIN — NYSTATIN CREAM 1 APPLICATION(S): 100000 CREAM TOPICAL at 14:31

## 2023-11-10 RX ADMIN — CHLORHEXIDINE GLUCONATE 15 MILLILITER(S): 213 SOLUTION TOPICAL at 17:43

## 2023-11-10 RX ADMIN — Medication 4: at 13:04

## 2023-11-10 RX ADMIN — ESCITALOPRAM OXALATE 10 MILLIGRAM(S): 10 TABLET, FILM COATED ORAL at 12:28

## 2023-11-10 RX ADMIN — Medication 1 TABLET(S): at 12:28

## 2023-11-10 RX ADMIN — OXYCODONE HYDROCHLORIDE 2.5 MILLIGRAM(S): 5 TABLET ORAL at 15:40

## 2023-11-10 RX ADMIN — Medication 500 MILLIGRAM(S): at 12:27

## 2023-11-10 RX ADMIN — Medication 2 DROP(S): at 18:12

## 2023-11-10 RX ADMIN — SIMETHICONE 80 MILLIGRAM(S): 80 TABLET, CHEWABLE ORAL at 17:53

## 2023-11-10 RX ADMIN — Medication 1 TABLET(S): at 06:18

## 2023-11-10 RX ADMIN — POLYETHYLENE GLYCOL 3350 17 GRAM(S): 17 POWDER, FOR SOLUTION ORAL at 06:19

## 2023-11-10 RX ADMIN — LIDOCAINE 1 PATCH: 4 CREAM TOPICAL at 19:09

## 2023-11-10 RX ADMIN — GABAPENTIN 100 MILLIGRAM(S): 400 CAPSULE ORAL at 21:28

## 2023-11-10 RX ADMIN — CEFEPIME 100 MILLIGRAM(S): 1 INJECTION, POWDER, FOR SOLUTION INTRAMUSCULAR; INTRAVENOUS at 14:28

## 2023-11-10 RX ADMIN — LIDOCAINE 1 PATCH: 4 CREAM TOPICAL at 12:29

## 2023-11-10 RX ADMIN — CEFEPIME 100 MILLIGRAM(S): 1 INJECTION, POWDER, FOR SOLUTION INTRAMUSCULAR; INTRAVENOUS at 21:28

## 2023-11-10 RX ADMIN — PANTOPRAZOLE SODIUM 40 MILLIGRAM(S): 20 TABLET, DELAYED RELEASE ORAL at 12:29

## 2023-11-10 RX ADMIN — Medication 4: at 23:51

## 2023-11-10 NOTE — PROGRESS NOTE ADULT - PROBLEM SELECTOR PLAN 2
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   - S/p CT abd/pelvis (10/28): Sacral decubitus ulcer extending to the coccyx with osseous remodeling and pelvic MRI or bone scan to recc to exclude osteomyelitis.  - MRI pelvis 10/30 with Osteomyelitis, c/w current Cefepime for 7 days, Vancomycin d/marli   - Elevated, ESR 85   - Elevated,    - Wound care on board  -11/9:  Following acute infection,  will resume Cipro 500mg q 12 and Keflex 500mg q 6 until 12/10.

## 2023-11-10 NOTE — PROGRESS NOTE ADULT - PROBLEM SELECTOR PLAN 6
-Continue home Prednisone regimen  *fibromyalgia  -continue home Gabapentin 100mg daily  -continue home Fentanyl patches  - added Lidocaine patch   - Oxycodone 2.5mg q6 hrs PRN (in addition to Tylenol PRN)

## 2023-11-10 NOTE — PROGRESS NOTE ADULT - SUBJECTIVE AND OBJECTIVE BOX
Patient is a 73y old  Female who presents with a chief complaint of     HPI:  11/10/2023    Appears lethargic, sleepy  On Mech vent. Afebrile, no distress noted.    PAST MEDICAL & SURGICAL HISTORY:  Diabetes      Rheumatoid arthritis      Fibromyalgia      Hypothyroid      Hypertension      H/O tracheostomy      PEG (percutaneous endoscopic gastrostomy) status          ALLERY AND IMMUNOLOGIC: No hives or eczema    Allergies    penicillin (Unknown)  heparin (Unknown)  Tagamet (Unknown)  pineapple (Unknown)  walnut (Unknown)  Pecan, Filbert, Hazelnut (Unknown)  Lyrica (Unknown)    Intolerances        Social History:     FAMILY HISTORY:      MEDICATIONS  (STANDING):  acetaminophen   IVPB .. 1000 milliGRAM(s) IV Intermittent once  acetylcysteine 10%  Inhalation 4 milliLiter(s) Inhalation once  albuterol/ipratropium for Nebulization 3 milliLiter(s) Nebulizer every 6 hours  artificial  tears Solution 2 Drop(s) Both EYES every 6 hours  aztreonam  IVPB      aztreonam  IVPB 1000 milliGRAM(s) IV Intermittent every 8 hours  calcium carbonate    500 mG (Tums) Chewable 1 Tablet(s) Chew every 12 hours  chlorhexidine 0.12% Liquid 15 milliLiter(s) Oral Mucosa every 12 hours  chlorhexidine 4% Liquid 1 Application(s) Topical <User Schedule>  dextrose 50% Injectable 50 milliLiter(s) IV Push once  escitalopram 10 milliGRAM(s) Oral daily  fentaNYL   Patch  25 MICROgram(s)/Hr 1 Patch Transdermal every 72 hours  gabapentin Solution 100 milliGRAM(s) Enteral Tube at bedtime  insulin lispro (ADMELOG) corrective regimen sliding scale   SubCutaneous every 6 hours  levothyroxine 100 MICROGram(s) Enteral Tube daily  multivitamin/minerals/iron Oral Solution (CENTRUM) 15 milliLiter(s) Oral daily  pantoprazole   Suspension 40 milliGRAM(s) Enteral Tube daily  predniSONE   Tablet 5 milliGRAM(s) Oral daily  QUEtiapine 12.5 milliGRAM(s) Oral at bedtime  simethicone 80 milliGRAM(s) Chew four times a day  sodium chloride 3%  Inhalation 4 milliLiter(s) Inhalation every 6 hours  vancomycin  IVPB 1000 milliGRAM(s) IV Intermittent every 12 hours  zinc sulfate 220 milliGRAM(s) Oral daily    MEDICATIONS  (PRN):  sodium chloride 0.65% Nasal 2 Spray(s) Both Nostrils two times a day PRN Nasal Congestion          I&O's Summary    27 Oct 2023 07:01  -  28 Oct 2023 07:00  --------------------------------------------------------  IN: 200 mL / OUT: 400 mL / NET: -200 mL    28 Oct 2023 07:01  -  28 Oct 2023 19:00  --------------------------------------------------------  IN: 710 mL / OUT: 0 mL / NET: 710 mL        PHYSICAL EXAM:  Vital Signs Last 24 Hrs  T(C): 37.2 (10 Nov 2023 04:11), Max: 38.2 (09 Nov 2023 12:45)  T(F): 98.9 (10 Nov 2023 04:11), Max: 100.8 (09 Nov 2023 12:45)  HR: 71 (10 Nov 2023 09:17) (62 - 78)  BP: 145/69 (10 Nov 2023 04:11) (141/63 - 168/73)  BP(mean): --  RR: 12 (10 Nov 2023 04:11) (12 - 22)  SpO2: 99% (10 Nov 2023 09:17) (99% - 100%)    Parameters below as of 10 Nov 2023 04:11  Patient On (Oxygen Delivery Method): ventilator      GENERAL: NAD, well-developed  HEAD:  Atraumatic, Normocephalic  EYES: EOMI, PERRLA, conjunctiva and sclera clear  NECK: Supple, No JVD. Trach in place, connected to mech vent  CHEST/LUNG: Clear to auscultation bilaterally; No wheeze  HEART: Regular rate and rhythm; No murmurs, rubs, or gallops  ABDOMEN: Soft, tender, Nondistended; Bowel sounds present PEG in place  EXTREMITIES:  2+ Peripheral Pulses, No clubbing, cyanosis, or edema  PSYCH: AAOx3  NEUROLOGY: Bed bound, functionally quadriplegic  SKIN: No rashes or lesions    LABS:                                 8.6    6.74  )-----------( 121      ( 09 Nov 2023 06:58 )             30.0   11-09    135  |  101  |  21  ----------------------------<  133<H>  4.0   |  25  |  <0.30<L>    Ca    9.4      09 Nov 2023 06:58  Phos  3.6     11-09  Mg     1.9     11-09                 8.5    6.07  )-----------( 117      ( 28 Oct 2023 07:19 )             28.4     10-28    135  |  100  |  16  ----------------------------<  77  3.3<L>   |  27  |  <0.30<L>    Ca    8.3<L>      28 Oct 2023 07:19  Phos  3.2     10-28  Mg     1.9     10-28    TPro  6.7  /  Alb  3.0<L>  /  TBili  0.2  /  DBili  x   /  AST  21  /  ALT  23  /  AlkPhos  47  10-28    PT/INR - ( 27 Oct 2023 14:39 )   PT: 12.3 sec;   INR: 1.12 ratio         PTT - ( 27 Oct 2023 14:39 )  PTT:30.8 sec      Urinalysis Basic - ( 28 Oct 2023 07:19 )    Color: x / Appearance: x / SG: x / pH: x  Gluc: 77 mg/dL / Ketone: x  / Bili: x / Urobili: x   Blood: x / Protein: x / Nitrite: x   Leuk Esterase: x / RBC: x / WBC x   Sq Epi: x / Non Sq Epi: x / Bacteria: x        RADIOLOGY & ADDITIONAL TESTS:    Imaging Personally Reviewed:    Consultant(s) Notes Reviewed:      Care Discussed with Consultants/Other Providers:

## 2023-11-10 NOTE — PROGRESS NOTE ADULT - PROBLEM SELECTOR PLAN 1
Found w/ Bilateral PNA vs Atelectasis, and Possible OM Sacrum   S/p Fevers at home, Afebrile since admission   - S/p Chest CT (10/28):  c/w Bilateral PNA vs Atelectasis   - Started Empirically on Vanco, Aztreonam   - Blood cx: NGTD   - urine culture: negative   - Sputum cx + Pseudomonas aeruginosa, S. aureus  - Sputum Cx 11/6: + PSA and Serratia, sensitivities pending   - Cefepime 2GM Q8H started 11/8 -->?

## 2023-11-10 NOTE — CHART NOTE - NSCHARTNOTEFT_GEN_A_CORE
Nutrition Follow Up Note  Patient seen for: TF follow up     Chart reviewed, events noted.    Source: Electronic Medical Record, Team      Per chart, " 73y old  Female who presents with a chief complaint of 72 y/o F with PMHx of T2DM, HTN, HLD, anemia, hypothyroidism, RA, fibromyalgia, remote cerebral aneurysm repair, acute hypercapnic respiratory failure now with tracheostomy, PEG tube, and bedbound (trach change , PEG change ), sacral pressure ulcer, BIBEMS from home for 6 day hx of bleeding from the tracheostomy and PEG tube with associated abdominal pain, recent history of auditory and visual hallucinations, and with chronic sacral decubitus ulcer."    Diet Order:   Diet, NPO with Tube Feed:   Tube Feeding Modality: Gastrostomy  [employer] Peptide 1.5  Total Volume for 24 Hours (mL): 900  Continuous  Starting Tube Feed Rate {mL per Hour}: 50  Increase Tube Feed Rate by (mL): 0  Until Goal Tube Feed Rate (mL per Hour): 50  Tube Feed Duration (in Hours): 18  Tube Feed Start Time: 06:00  Tube Feed Stop Time: 23:59  Free Water Flush  Pump   Rate (mL per Hour): 10   Frequency: Every 2 Hours  Liquid Protein Supplement     Qty per Day:  1 (23)    (Current EN Provision Provides: 900mL of formula, 1385kcal/day, 66g protein/day --> not including protein modular)     Nutrition-related events:  -EN: per flowsheet, 94% of EN provision on , 72% of EN provision on , 100% of EN provision on    -GI: last BM documented 11/10; bowel regimen ordered (Miralax); Mylicon and Protonix ordered; bleeding and leakage around PEG has subsided   -Endo: 14 units long acting, sliding scale insulin; noted on Prednisone, monitor for steroid-induced hyperglycemia   -Resp: chronic trach to vent         Weights:   Daily Weight in k.9 ()    Nutritionally Pertinent MEDICATIONS  (STANDING):  ascorbic acid  calcium carbonate    500 mG (Tums) Chewable  cefepime   IVPB  cefepime   IVPB  dextrose 50% Injectable  insulin glargine Injectable (LANTUS)  insulin lispro (ADMELOG) corrective regimen sliding scale  levothyroxine  multivitamin/minerals/iron Oral Solution (CENTRUM)  pantoprazole   Suspension  polyethylene glycol 3350  predniSONE   Tablet  simethicone  zinc sulfate    Pertinent Labs:   A1C with Estimated Average Glucose Result: 7.5 % (10-28-23 @ 07:18)    Finger Sticks:  POCT Blood Glucose.: 227 mg/dL (11-10 @ 11:53)  POCT Blood Glucose.: 99 mg/dL (11-10 @ 06:23)  POCT Blood Glucose.: 180 mg/dL ( @ 23:48)  POCT Blood Glucose.: 134 mg/dL ( @ 17:33)  POCT Blood Glucose.: 164 mg/dL ( @ 15:56)      (Per flowsheet)  Pressure Injuries:  -Sacrum: stage 4     Edema:   -1+ generalized     Estimated Needs using most recent weight 50.9kg  -Energy: 27-32kcal/kg 1374-1628kcal/day  -Protein: 1.2-1.4g/kg 61-71g protein/day  -Defer free water to team    Previous Nutrition Diagnosis: Increased Nutrient Needs  Nutrition Diagnosis is: ongoing     New Nutrition Diagnosis: [x] Not applicable    Nutrition Care Plan:  [x] In Progress  [] Achieved  [] Not applicable    Nutrition Interventions:     Education Provided:       [] Yes:  [x] No:     Recommendations:      1) Continue [employer] Peptide 1.5 @ 50mL x 18hrs providing 900mL of formula, 1385kcal/day (27kcal/kg), 66g protein/day (1.3g/kg), 630mL free water - based on 50.9kg   2) Per Daughter's request, continue LPS x1/day for an additional 15g protein/day and 100kcal/day   -Total: 1485kcal/day, 81g protein/day   3) Continue multivitamin and vitamin C daily      Monitoring and Evaluation:   Continue to monitor nutritional intake, tolerance to diet prescription, weights, labs, skin integrity      RD remains available upon request and will follow up per protocol    Stephania Roblero, MS, RDN, CDN (Teams)

## 2023-11-10 NOTE — PROGRESS NOTE ADULT - ASSESSMENT
72 y/o F with PMHx of T2DM, HTN, HLD, anemia, hypothyroidism, RA, fibromyalgia, remote cerebral aneurysm repair, acute hypercapnic respiratory failure now with tracheostomy, PEG tube, and bedbound (trach change 8/18, PEG change 8/14), sacral pressure ulcer, BIBEMS from home for 6 day hx of bleeding from the tracheostomy and PEG tube with associated abdominal pain, recent history of auditory and visual hallucinations, and with chronic sacral decubitus ulcer. Exam notable for mild abdominal distention with LLQ and RLQ tenderness, tracheostomy in place with no obvious bleeding, PEG tube with scant amount of dried blood, at baseline neurologic status. Imaging showing no active bleeding around tracheostomy site, however was notable for mild thickening along PEG tube tract, sacral ulcer extending to the coccyx suggestive of possible osteomyelitis, and bibasilar patchy lung opacities with volume loss. Patient admitted for respiratory support, wound care of tracheostomy and PEG, and management of presumed sacral osteomyelitis.    11/8: No fevers overnight, Spu Cx 11/6: + PSA and Serratia, sensitivities pending; Abx changed to Cefepime while pending speciation. Psych as to follow up with daughter per request. F/u AM Labs.  11/9:  TSH levels elevated, will repeat with T4 tomorrow morning.  Daughter expresses concerns about hallucinations.  Seroquel reduced back to once a daily.  Oxycodone dose reduced to 2.5mg PRN.   Sputum growing serratia and pseudomonas, awaiting further sensitivities        72 y/o F with PMHx of T2DM, HTN, HLD, anemia, hypothyroidism, RA, fibromyalgia, remote cerebral aneurysm repair, acute hypercapnic respiratory failure now with tracheostomy, PEG tube, and bedbound (trach change 8/18, PEG change 8/14), sacral pressure ulcer, BIBEMS from home for 6 day hx of bleeding from the tracheostomy and PEG tube with associated abdominal pain, recent history of auditory and visual hallucinations, and with chronic sacral decubitus ulcer. Exam notable for mild abdominal distention with LLQ and RLQ tenderness, tracheostomy in place with no obvious bleeding, PEG tube with scant amount of dried blood, at baseline neurologic status. Imaging showing no active bleeding around tracheostomy site, however was notable for mild thickening along PEG tube tract, sacral ulcer extending to the coccyx suggestive of possible osteomyelitis, and bibasilar patchy lung opacities with volume loss. Patient admitted for respiratory support, wound care of tracheostomy and PEG, and management of presumed sacral osteomyelitis.    11/8: No fevers overnight, Spu Cx 11/6: + PSA and Serratia, sensitivities pending; Abx changed to Cefepime while pending speciation. Psych as to follow up with daughter per request. F/u AM Labs.  11/9:  TSH levels elevated, will repeat with T4 tomorrow morning.  Daughter expresses concerns about hallucinations.  Seroquel reduced back to once a daily.  Oxycodone dose reduced to 2.5mg PRN.   Sputum growing serratia and pseudomonas, awaiting further sensitivities.  11/10:  Pseudomonas and Serratia both sensitive to Cefepime.  Patient complained of vaginal itiching, will send UA, GC probe.

## 2023-11-10 NOTE — PROGRESS NOTE ADULT - NS ATTEND AMEND GEN_ALL_CORE FT
73F w/ DM, HTN, HLD, anemia, hypothyroidism, RA, fibromyalgia, remote cerebral aneursym repair, hypercapnic respiratory failure s/p tracheostomy/PEG, bedbound, sacral pressure ulcer. Admitted w/ bleeding from tracheostomy and PEG w/ associated abdominal pain, recent hx of auditory/visual hallucinations. Since admission, no further bleeding noted from either trach or PEG. Imaging showed mild thickening along PEG tube tract and sacral ulcer extending to coccyx suggestive of OM.     MRI with osteomyelitis  Repeat sputum now with serratia, increased secretions back on abx.  Improving delerium but with occasional hallucinations.   Vaginal itching overnight.     # Osteomyelitis  # Chronic hypoxemic RF  # oropharyngeal dysphagia  # acute on chronic pain  # Trach/Peg bleeding  # Tracheitis with PA and serratia  # Hx of hallucination, anxiety  # vaginal itching.   - MRI showing Osteo, on abx per ID, c/w wound care, no plans for surgery. Will need prolonged course of abx  - C/W vent, adjust as needed, will need vent on discharge  - Repeat sputum culture, with PA and serratia ,PA maybe colonization but serratia appears new. C/W cefepime. Will f/u with ID and sensitivity.   - family requested S/S eval but unable to given dependence on vent  - C/W pain control, multifactorial 2/2 to fibromyalgia as well as now osteo with pressure ulcer. Will decrease dose of pain meds.   - Peg irritation seen by GI, no revs for further interventions. H/H stable  - Seen by  for delirium and hallucinations. recommend Seroquel for delirium. Family requesting re-eval, was told psych would follow up. Will reconsult when patient is able to participate. Will decrease Seroquel as patient is sedated during the day.   - Will check UA, GC/CL, nystatin, if persistent may need pelvic exam.   - DVT ppx- lovenox  - Dispo- full code. Pending preparation to discharge home on vent.

## 2023-11-10 NOTE — CHART NOTE - NSCHARTNOTEFT_GEN_A_CORE
Interim Note    RD notified of family member (daughter) request to speak to RD re tube feedings.   Patient's daughter requested to continue with Alfred protein supplement. Patient needs calculated to include alfred for wound healing.   See Recommendations below     Chart Reviewed events noted  Per chart, " 73y old  Female who presents with a chief complaint of 74 y/o F with PMHx of T2DM, HTN, HLD, anemia, hypothyroidism, RA, fibromyalgia, remote cerebral aneurysm repair, acute hypercapnic respiratory failure now with tracheostomy, PEG tube, and bedbound (trach change 8/18, PEG change 8/14), sacral pressure ulcer, BIBEMS from home for 6 day hx of bleeding from the tracheostomy and PEG tube with associated abdominal pain, recent history of auditory and visual hallucinations, and with chronic sacral decubitus ulcer."    Patient Peg is no longer leaking per GI note. Enteral feeds resumed        Pressure Injuries:  -Sacrum: stage 4     Edema:   -1+ generalized     Estimated Needs using most recent weight 50.9kg  -Energy: 27-32kcal/kg 1374-1628kcal/day  -Protein: 1.2-1.4g/kg 61-71g protein/day  -Defer free water to team    Previous Nutrition Diagnosis: Increased Nutrient Needs  Nutrition Diagnosis is: ongoing     Nutrition Recommendations      1) Continue [employer] Peptide 1.5 @ 50mL x 18hrs providing 900mL of formula, 1350kcal/day (27kcal/kg), 66g protein/day (1.3g/kg), 630mL free water - based on 50.9kg   2) To address Sacral wound st IV,  resume Alfred x2 daily to provide additional total 28gm glutamine and arginine amino acids with collagen for wound healing, plus 160 calories   -Total: 1510kcal/day, 29.6/kg, 66gm protein 1.3/kg,  plus 28gm conditional  amino acids for wound healing  3) Continue multivitamin and vitamin C daily to meet increased nutrient needs  4) recommendations discussed with patient's daughter and PA  5) added free water per med team    MANDO Turk, RD, CDN

## 2023-11-10 NOTE — PROGRESS NOTE ADULT - SUBJECTIVE AND OBJECTIVE BOX
Patient is a 73y old  Female who presents with a chief complaint of 74 y/o F with PMHx of T2DM, HTN, HLD, anemia, hypothyroidism, RA, fibromyalgia, remote cerebral aneurysm repair, acute hypercapnic respiratory failure now with tracheostomy, PEG tube, and bedbound (trach change 8/18, PEG change 8/14), sacral pressure ulcer, BIBEMS from home for 6 day hx of bleeding from the tracheostomy and PEG tube with associated abdominal pain, recent history of auditory and visual hallucinations, and with chronic sacral decubitus ulcer.    (29 Oct 2023 14:01)      Interval Events:    REVIEW OF SYSTEMS:  [ ] Positive  [ ] All other systems negative  [ ] Unable to assess ROS because ________    Vital Signs Last 24 Hrs  T(C): 37.2 (11-10-23 @ 04:11), Max: 38.2 (11-09-23 @ 12:45)  T(F): 98.9 (11-10-23 @ 04:11), Max: 100.8 (11-09-23 @ 12:45)  HR: 67 (11-10-23 @ 04:11) (62 - 78)  BP: 145/69 (11-10-23 @ 04:11) (141/63 - 168/73)  RR: 12 (11-10-23 @ 04:11) (12 - 22)  SpO2: 100% (11-10-23 @ 04:11) (100% - 100%)    PHYSICAL EXAM:  HEENT:   [ ]Tracheostomy:  [ ]Pupils equal  [ ]No oral lesions  [ ]Abnormal    SKIN  [ ]No Rash  [ ] Abnormal  [ ] pressure    CARDIAC  [ ]Regular  [ ]Abnormal    PULMONARY  [ ]Bilateral Clear Breath Sounds  [ ]Normal Excursion  [ ]Abnormal    GI  [ ]PEG      [ ] +BS		              [ ]Soft, nondistended, nontender	  [ ]Abnormal    MUSCULOSKELETAL                                   [ ]Bedbound                 [ ]Abnormal    [ ]Ambulatory/OOB to chair                           EXTREMITIES                                         [ ]Normal  [ ]Edema                           NEUROLOGIC  [ ] Normal, non focal  [ ] Focal findings:    PSYCHIATRIC  [ ]Alert and appropriate  [ ] Sedated	 [ ]Agitated    :  Mcbride: [ ] Yes, if yes: Date of Placement:                   [  ] No    LINES: Central Lines [ ] Yes, if yes: Date of Placement                                     [  ] No    HOSPITAL MEDICATIONS:  MEDICATIONS  (STANDING):  artificial  tears Solution 2 Drop(s) Both EYES every 6 hours  ascorbic acid 500 milliGRAM(s) Oral daily  calcium carbonate    500 mG (Tums) Chewable 1 Tablet(s) Chew every 12 hours  cefepime   IVPB      cefepime   IVPB 2000 milliGRAM(s) IV Intermittent every 8 hours  chlorhexidine 0.12% Liquid 15 milliLiter(s) Oral Mucosa every 12 hours  chlorhexidine 4% Liquid 1 Application(s) Topical <User Schedule>  dextrose 50% Injectable 50 milliLiter(s) IV Push once  enoxaparin Injectable 40 milliGRAM(s) SubCutaneous every 24 hours  escitalopram 10 milliGRAM(s) Oral daily  fentaNYL   Patch  25 MICROgram(s)/Hr 1 Patch Transdermal every 72 hours  gabapentin Solution 100 milliGRAM(s) Enteral Tube at bedtime  insulin glargine Injectable (LANTUS) 14 Unit(s) SubCutaneous every morning  insulin lispro (ADMELOG) corrective regimen sliding scale   SubCutaneous every 6 hours  lactobacillus acidophilus 1 Tablet(s) Oral daily  levothyroxine 100 MICROGram(s) Enteral Tube <User Schedule>  lidocaine   4% Patch 1 Patch Transdermal daily  multivitamin/minerals/iron Oral Solution (CENTRUM) 15 milliLiter(s) Oral daily  oxybutynin 5 milliGRAM(s) Oral daily  pantoprazole   Suspension 40 milliGRAM(s) Enteral Tube daily  polyethylene glycol 3350 17 Gram(s) Oral two times a day  predniSONE   Tablet 5 milliGRAM(s) Oral daily  QUEtiapine 12.5 milliGRAM(s) Oral <User Schedule>  simethicone 80 milliGRAM(s) Chew four times a day  zinc sulfate 220 milliGRAM(s) Oral daily    MEDICATIONS  (PRN):  acetaminophen   Oral Liquid .. 1000 milliGRAM(s) Enteral Tube every 6 hours PRN Temp greater or equal to 38C (100.4F), Mild Pain (1 - 3)  albuterol/ipratropium for Nebulization 3 milliLiter(s) Nebulizer every 6 hours PRN Bronchospasm  diphenhydrAMINE Elixir 25 milliGRAM(s) Oral every 6 hours PRN Rash and/or Itching  oxyCODONE    Solution 2.5 milliGRAM(s) Oral every 6 hours PRN Moderate Pain (4 - 6)  sodium chloride 0.65% Nasal 2 Spray(s) Both Nostrils two times a day PRN Nasal Congestion      LABS:                        8.6    6.74  )-----------( 121      ( 09 Nov 2023 06:58 )             30.0     11-09    135  |  101  |  21  ----------------------------<  133<H>  4.0   |  25  |  <0.30<L>    Ca    9.4      09 Nov 2023 06:58  Phos  3.6     11-09  Mg     1.9     11-09        Urinalysis Basic - ( 09 Nov 2023 06:58 )    Color: x / Appearance: x / SG: x / pH: x  Gluc: 133 mg/dL / Ketone: x  / Bili: x / Urobili: x   Blood: x / Protein: x / Nitrite: x   Leuk Esterase: x / RBC: x / WBC x   Sq Epi: x / Non Sq Epi: x / Bacteria: x          CAPILLARY BLOOD GLUCOSE    MICROBIOLOGY:     RADIOLOGY:  [ ] Reviewed and interpreted by me    Mode: AC/ CMV (Assist Control/ Continuous Mandatory Ventilation)  RR (machine): 12  TV (machine): 300  FiO2: 30  PEEP: 5  ITime: 1  MAP: 7  PIP: 23   Patient is a 73y old  Female who presents with a chief complaint of 72 y/o F with PMHx of T2DM, HTN, HLD, anemia, hypothyroidism, RA, fibromyalgia, remote cerebral aneurysm repair, acute hypercapnic respiratory failure now with tracheostomy, PEG tube, and bedbound (trach change 8/18, PEG change 8/14), sacral pressure ulcer, BIBEMS from home for 6 day hx of bleeding from the tracheostomy and PEG tube with associated abdominal pain, recent history of auditory and visual hallucinations, and with chronic sacral decubitus ulcer.    (29 Oct 2023 14:01)      Interval Events: no events over night.     REVIEW OF SYSTEMS:  [X] Positive:  Nausea, groin itching  [ ] All other systems negative during time of   [ ] Unable to assess ROS because ________    Vital Signs Last 24 Hrs  T(C): 37.2 (11-10-23 @ 04:11), Max: 38.2 (11-09-23 @ 12:45)  T(F): 98.9 (11-10-23 @ 04:11), Max: 100.8 (11-09-23 @ 12:45)  HR: 67 (11-10-23 @ 04:11) (62 - 78)  BP: 145/69 (11-10-23 @ 04:11) (141/63 - 168/73)  RR: 12 (11-10-23 @ 04:11) (12 - 22)  SpO2: 100% (11-10-23 @ 04:11) (100% - 100%)      PHYSICAL EXAM:  HEENT:   [X] Tracheostomy:  #8 Cuffed Portex  [X] 4mm PERRL B/L  [X] No oral lesions  [ ] Abnormal    SKIN  [ ] No Rash  [ ]  Abnormal  [X] pressure: Sacral wound    CARDIAC  [X] Regular  [ ] Abnormal    PULMONARY  [X] Bilateral Clear Breath Sounds  [ ] Normal Excursion  [ ] Abnormal    GI  [X] PEG      [X] +BS		              [X] Soft, nondistended, nontender	  [ ] Abnormal    MUSCULOSKELETAL                                   [  ] Bedbound                 [X] Abnormal:  Deconditioned but can partially move all limbs   [ ] Ambulatory/OOB to chair                           EXTREMITIES                                         [X] Normal  [ ]Edema                           NEUROLOGIC  [ ] Normal, non focal  [X] Focal findings:  Alert and Oriented x2; responds appropriately top questions by mouthing and able to phonate while on vent; B/L foot drop with partial ability to dorsiflex; moves both arms with minimal hand dexterity B/L    PSYCHIATRIC  [X] Alert; somewhat anxious at times but mostly appropriate  [ ] Sedated	 [ ]Agitated    :  Mcbride: [ ] Yes, if yes: Date of Placement:                   [X] No    LINES: Central Lines [ ] Yes, if yes: Date of Placement                                     [X] No      HOSPITAL MEDICATIONS:  MEDICATIONS  (STANDING):  artificial  tears Solution 2 Drop(s) Both EYES every 6 hours  ascorbic acid 500 milliGRAM(s) Oral daily  calcium carbonate    500 mG (Tums) Chewable 1 Tablet(s) Chew every 12 hours  cefepime   IVPB      cefepime   IVPB 2000 milliGRAM(s) IV Intermittent every 8 hours  chlorhexidine 0.12% Liquid 15 milliLiter(s) Oral Mucosa every 12 hours  chlorhexidine 4% Liquid 1 Application(s) Topical <User Schedule>  dextrose 50% Injectable 50 milliLiter(s) IV Push once  enoxaparin Injectable 40 milliGRAM(s) SubCutaneous every 24 hours  escitalopram 10 milliGRAM(s) Oral daily  fentaNYL   Patch  25 MICROgram(s)/Hr 1 Patch Transdermal every 72 hours  gabapentin Solution 100 milliGRAM(s) Enteral Tube at bedtime  insulin glargine Injectable (LANTUS) 14 Unit(s) SubCutaneous every morning  insulin lispro (ADMELOG) corrective regimen sliding scale   SubCutaneous every 6 hours  lactobacillus acidophilus 1 Tablet(s) Oral daily  levothyroxine 100 MICROGram(s) Enteral Tube <User Schedule>  lidocaine   4% Patch 1 Patch Transdermal daily  multivitamin/minerals/iron Oral Solution (CENTRUM) 15 milliLiter(s) Oral daily  oxybutynin 5 milliGRAM(s) Oral daily  pantoprazole   Suspension 40 milliGRAM(s) Enteral Tube daily  polyethylene glycol 3350 17 Gram(s) Oral two times a day  predniSONE   Tablet 5 milliGRAM(s) Oral daily  QUEtiapine 12.5 milliGRAM(s) Oral <User Schedule>  simethicone 80 milliGRAM(s) Chew four times a day  zinc sulfate 220 milliGRAM(s) Oral daily    MEDICATIONS  (PRN):  acetaminophen   Oral Liquid .. 1000 milliGRAM(s) Enteral Tube every 6 hours PRN Temp greater or equal to 38C (100.4F), Mild Pain (1 - 3)  albuterol/ipratropium for Nebulization 3 milliLiter(s) Nebulizer every 6 hours PRN Bronchospasm  diphenhydrAMINE Elixir 25 milliGRAM(s) Oral every 6 hours PRN Rash and/or Itching  oxyCODONE    Solution 2.5 milliGRAM(s) Oral every 6 hours PRN Moderate Pain (4 - 6)  sodium chloride 0.65% Nasal 2 Spray(s) Both Nostrils two times a day PRN Nasal Congestion      LABS:                            Urinalysis Basic - ( 09 Nov 2023 06:58 )    Color: x / Appearance: x / SG: x / pH: x  Gluc: 133 mg/dL / Ketone: x  / Bili: x / Urobili: x   Blood: x / Protein: x / Nitrite: x   Leuk Esterase: x / RBC: x / WBC x   Sq Epi: x / Non Sq Epi: x / Bacteria: x          CAPILLARY BLOOD GLUCOSE    MICROBIOLOGY:     RADIOLOGY:  [ ] Reviewed and interpreted by me    Mode: AC/ CMV (Assist Control/ Continuous Mandatory Ventilation)  RR (machine): 12  TV (machine): 300  FiO2: 30  PEEP: 5  ITime: 1  MAP: 7  PIP: 23   Patient is a 73y old  Female who presents with a chief complaint of 74 y/o F with PMHx of T2DM, HTN, HLD, anemia, hypothyroidism, RA, fibromyalgia, remote cerebral aneurysm repair, acute hypercapnic respiratory failure now with tracheostomy, PEG tube, and bedbound (trach change 8/18, PEG change 8/14), sacral pressure ulcer, BIBEMS from home for 6 day hx of bleeding from the tracheostomy and PEG tube with associated abdominal pain, recent history of auditory and visual hallucinations, and with chronic sacral decubitus ulcer.    (29 Oct 2023 14:01)      Interval Events: no events over night.     REVIEW OF SYSTEMS:  [X] Positive:  Nausea, groin itching  [ ] All other systems negative during time of   [ ] Unable to assess ROS because ________    Vital Signs Last 24 Hrs  T(C): 37.2 (11-10-23 @ 04:11), Max: 38.2 (11-09-23 @ 12:45)  T(F): 98.9 (11-10-23 @ 04:11), Max: 100.8 (11-09-23 @ 12:45)  HR: 67 (11-10-23 @ 04:11) (62 - 78)  BP: 145/69 (11-10-23 @ 04:11) (141/63 - 168/73)  RR: 12 (11-10-23 @ 04:11) (12 - 22)  SpO2: 100% (11-10-23 @ 04:11) (100% - 100%)      PHYSICAL EXAM:  HEENT:   [X] Tracheostomy:  #9 Cuffed Portex  [X] 4mm PERRL B/L  [X] No oral lesions  [ ] Abnormal    SKIN  [ ] No Rash  [ ]  Abnormal  [X] pressure: Sacral wound    CARDIAC  [X] Regular  [ ] Abnormal    PULMONARY  [X] Bilateral Clear Breath Sounds  [ ] Normal Excursion  [ ] Abnormal    GI  [X] PEG      [X] +BS		              [X] Soft, nondistended, nontender	  [ ] Abnormal    MUSCULOSKELETAL                                   [  ] Bedbound                 [X] Abnormal:  Deconditioned but can partially move all limbs   [ ] Ambulatory/OOB to chair                           EXTREMITIES                                         [X] Normal  [ ]Edema                           NEUROLOGIC  [ ] Normal, non focal  [X] Focal findings:  Alert and Oriented x2; responds appropriately top questions by mouthing and able to phonate while on vent; B/L foot drop with partial ability to dorsiflex; moves both arms with minimal hand dexterity B/L    PSYCHIATRIC  [X] Alert; somewhat anxious at times but mostly appropriate  [ ] Sedated	 [ ]Agitated    :  Mcbride: [ ] Yes, if yes: Date of Placement:                   [X] No    LINES: Central Lines [ ] Yes, if yes: Date of Placement                                     [X] No      HOSPITAL MEDICATIONS:  MEDICATIONS  (STANDING):  artificial  tears Solution 2 Drop(s) Both EYES every 6 hours  ascorbic acid 500 milliGRAM(s) Oral daily  calcium carbonate    500 mG (Tums) Chewable 1 Tablet(s) Chew every 12 hours  cefepime   IVPB      cefepime   IVPB 2000 milliGRAM(s) IV Intermittent every 8 hours  chlorhexidine 0.12% Liquid 15 milliLiter(s) Oral Mucosa every 12 hours  chlorhexidine 4% Liquid 1 Application(s) Topical <User Schedule>  dextrose 50% Injectable 50 milliLiter(s) IV Push once  enoxaparin Injectable 40 milliGRAM(s) SubCutaneous every 24 hours  escitalopram 10 milliGRAM(s) Oral daily  fentaNYL   Patch  25 MICROgram(s)/Hr 1 Patch Transdermal every 72 hours  gabapentin Solution 100 milliGRAM(s) Enteral Tube at bedtime  insulin glargine Injectable (LANTUS) 14 Unit(s) SubCutaneous every morning  insulin lispro (ADMELOG) corrective regimen sliding scale   SubCutaneous every 6 hours  lactobacillus acidophilus 1 Tablet(s) Oral daily  levothyroxine 100 MICROGram(s) Enteral Tube <User Schedule>  lidocaine   4% Patch 1 Patch Transdermal daily  multivitamin/minerals/iron Oral Solution (CENTRUM) 15 milliLiter(s) Oral daily  oxybutynin 5 milliGRAM(s) Oral daily  pantoprazole   Suspension 40 milliGRAM(s) Enteral Tube daily  polyethylene glycol 3350 17 Gram(s) Oral two times a day  predniSONE   Tablet 5 milliGRAM(s) Oral daily  QUEtiapine 12.5 milliGRAM(s) Oral <User Schedule>  simethicone 80 milliGRAM(s) Chew four times a day  zinc sulfate 220 milliGRAM(s) Oral daily    MEDICATIONS  (PRN):  acetaminophen   Oral Liquid .. 1000 milliGRAM(s) Enteral Tube every 6 hours PRN Temp greater or equal to 38C (100.4F), Mild Pain (1 - 3)  albuterol/ipratropium for Nebulization 3 milliLiter(s) Nebulizer every 6 hours PRN Bronchospasm  diphenhydrAMINE Elixir 25 milliGRAM(s) Oral every 6 hours PRN Rash and/or Itching  oxyCODONE    Solution 2.5 milliGRAM(s) Oral every 6 hours PRN Moderate Pain (4 - 6)  sodium chloride 0.65% Nasal 2 Spray(s) Both Nostrils two times a day PRN Nasal Congestion      LABS:                            Urinalysis Basic - ( 09 Nov 2023 06:58 )    Color: x / Appearance: x / SG: x / pH: x  Gluc: 133 mg/dL / Ketone: x  / Bili: x / Urobili: x   Blood: x / Protein: x / Nitrite: x   Leuk Esterase: x / RBC: x / WBC x   Sq Epi: x / Non Sq Epi: x / Bacteria: x          CAPILLARY BLOOD GLUCOSE    MICROBIOLOGY:     RADIOLOGY:  [ ] Reviewed and interpreted by me    Mode: AC/ CMV (Assist Control/ Continuous Mandatory Ventilation)  RR (machine): 12  TV (machine): 300  FiO2: 30  PEEP: 5  ITime: 1  MAP: 7  PIP: 23

## 2023-11-11 LAB
ALBUMIN SERPL ELPH-MCNC: 3.1 G/DL — LOW (ref 3.3–5)
ALBUMIN SERPL ELPH-MCNC: 3.1 G/DL — LOW (ref 3.3–5)
ALP SERPL-CCNC: 47 U/L — SIGNIFICANT CHANGE UP (ref 40–120)
ALP SERPL-CCNC: 47 U/L — SIGNIFICANT CHANGE UP (ref 40–120)
ALT FLD-CCNC: 27 U/L — SIGNIFICANT CHANGE UP (ref 10–45)
ALT FLD-CCNC: 27 U/L — SIGNIFICANT CHANGE UP (ref 10–45)
ANION GAP SERPL CALC-SCNC: 9 MMOL/L — SIGNIFICANT CHANGE UP (ref 5–17)
ANION GAP SERPL CALC-SCNC: 9 MMOL/L — SIGNIFICANT CHANGE UP (ref 5–17)
AST SERPL-CCNC: 27 U/L — SIGNIFICANT CHANGE UP (ref 10–40)
AST SERPL-CCNC: 27 U/L — SIGNIFICANT CHANGE UP (ref 10–40)
BASOPHILS # BLD AUTO: 0 K/UL — SIGNIFICANT CHANGE UP (ref 0–0.2)
BASOPHILS # BLD AUTO: 0 K/UL — SIGNIFICANT CHANGE UP (ref 0–0.2)
BASOPHILS NFR BLD AUTO: 0 % — SIGNIFICANT CHANGE UP (ref 0–2)
BASOPHILS NFR BLD AUTO: 0 % — SIGNIFICANT CHANGE UP (ref 0–2)
BILIRUB SERPL-MCNC: 0.4 MG/DL — SIGNIFICANT CHANGE UP (ref 0.2–1.2)
BILIRUB SERPL-MCNC: 0.4 MG/DL — SIGNIFICANT CHANGE UP (ref 0.2–1.2)
BUN SERPL-MCNC: 27 MG/DL — HIGH (ref 7–23)
BUN SERPL-MCNC: 27 MG/DL — HIGH (ref 7–23)
C TRACH RRNA SPEC QL NAA+PROBE: SIGNIFICANT CHANGE UP
C TRACH RRNA SPEC QL NAA+PROBE: SIGNIFICANT CHANGE UP
CALCIUM SERPL-MCNC: 9.2 MG/DL — SIGNIFICANT CHANGE UP (ref 8.4–10.5)
CALCIUM SERPL-MCNC: 9.2 MG/DL — SIGNIFICANT CHANGE UP (ref 8.4–10.5)
CHLORIDE SERPL-SCNC: 102 MMOL/L — SIGNIFICANT CHANGE UP (ref 96–108)
CHLORIDE SERPL-SCNC: 102 MMOL/L — SIGNIFICANT CHANGE UP (ref 96–108)
CO2 SERPL-SCNC: 26 MMOL/L — SIGNIFICANT CHANGE UP (ref 22–31)
CO2 SERPL-SCNC: 26 MMOL/L — SIGNIFICANT CHANGE UP (ref 22–31)
CREAT SERPL-MCNC: <0.3 MG/DL — LOW (ref 0.5–1.3)
CREAT SERPL-MCNC: <0.3 MG/DL — LOW (ref 0.5–1.3)
CULTURE RESULTS: SIGNIFICANT CHANGE UP
EGFR: 113 ML/MIN/1.73M2 — SIGNIFICANT CHANGE UP
EGFR: 113 ML/MIN/1.73M2 — SIGNIFICANT CHANGE UP
EOSINOPHIL # BLD AUTO: 0.05 K/UL — SIGNIFICANT CHANGE UP (ref 0–0.5)
EOSINOPHIL # BLD AUTO: 0.05 K/UL — SIGNIFICANT CHANGE UP (ref 0–0.5)
EOSINOPHIL NFR BLD AUTO: 0.9 % — SIGNIFICANT CHANGE UP (ref 0–6)
EOSINOPHIL NFR BLD AUTO: 0.9 % — SIGNIFICANT CHANGE UP (ref 0–6)
GLUCOSE BLDC GLUCOMTR-MCNC: 137 MG/DL — HIGH (ref 70–99)
GLUCOSE BLDC GLUCOMTR-MCNC: 137 MG/DL — HIGH (ref 70–99)
GLUCOSE BLDC GLUCOMTR-MCNC: 151 MG/DL — HIGH (ref 70–99)
GLUCOSE BLDC GLUCOMTR-MCNC: 151 MG/DL — HIGH (ref 70–99)
GLUCOSE BLDC GLUCOMTR-MCNC: 276 MG/DL — HIGH (ref 70–99)
GLUCOSE BLDC GLUCOMTR-MCNC: 276 MG/DL — HIGH (ref 70–99)
GLUCOSE SERPL-MCNC: 135 MG/DL — HIGH (ref 70–99)
GLUCOSE SERPL-MCNC: 135 MG/DL — HIGH (ref 70–99)
HCT VFR BLD CALC: 31.3 % — LOW (ref 34.5–45)
HCT VFR BLD CALC: 31.3 % — LOW (ref 34.5–45)
HGB BLD-MCNC: 9.2 G/DL — LOW (ref 11.5–15.5)
HGB BLD-MCNC: 9.2 G/DL — LOW (ref 11.5–15.5)
LYMPHOCYTES # BLD AUTO: 1.18 K/UL — SIGNIFICANT CHANGE UP (ref 1–3.3)
LYMPHOCYTES # BLD AUTO: 1.18 K/UL — SIGNIFICANT CHANGE UP (ref 1–3.3)
LYMPHOCYTES # BLD AUTO: 20.2 % — SIGNIFICANT CHANGE UP (ref 13–44)
LYMPHOCYTES # BLD AUTO: 20.2 % — SIGNIFICANT CHANGE UP (ref 13–44)
MAGNESIUM SERPL-MCNC: 1.8 MG/DL — SIGNIFICANT CHANGE UP (ref 1.6–2.6)
MAGNESIUM SERPL-MCNC: 1.8 MG/DL — SIGNIFICANT CHANGE UP (ref 1.6–2.6)
MCHC RBC-ENTMCNC: 28 PG — SIGNIFICANT CHANGE UP (ref 27–34)
MCHC RBC-ENTMCNC: 28 PG — SIGNIFICANT CHANGE UP (ref 27–34)
MCHC RBC-ENTMCNC: 29.4 GM/DL — LOW (ref 32–36)
MCHC RBC-ENTMCNC: 29.4 GM/DL — LOW (ref 32–36)
MCV RBC AUTO: 95.4 FL — SIGNIFICANT CHANGE UP (ref 80–100)
MCV RBC AUTO: 95.4 FL — SIGNIFICANT CHANGE UP (ref 80–100)
MONOCYTES # BLD AUTO: 0.2 K/UL — SIGNIFICANT CHANGE UP (ref 0–0.9)
MONOCYTES # BLD AUTO: 0.2 K/UL — SIGNIFICANT CHANGE UP (ref 0–0.9)
MONOCYTES NFR BLD AUTO: 3.5 % — SIGNIFICANT CHANGE UP (ref 2–14)
MONOCYTES NFR BLD AUTO: 3.5 % — SIGNIFICANT CHANGE UP (ref 2–14)
N GONORRHOEA RRNA SPEC QL NAA+PROBE: SIGNIFICANT CHANGE UP
N GONORRHOEA RRNA SPEC QL NAA+PROBE: SIGNIFICANT CHANGE UP
NEUTROPHILS # BLD AUTO: 3.99 K/UL — SIGNIFICANT CHANGE UP (ref 1.8–7.4)
NEUTROPHILS # BLD AUTO: 3.99 K/UL — SIGNIFICANT CHANGE UP (ref 1.8–7.4)
NEUTROPHILS NFR BLD AUTO: 66.7 % — SIGNIFICANT CHANGE UP (ref 43–77)
NEUTROPHILS NFR BLD AUTO: 66.7 % — SIGNIFICANT CHANGE UP (ref 43–77)
PHOSPHATE SERPL-MCNC: 4 MG/DL — SIGNIFICANT CHANGE UP (ref 2.5–4.5)
PHOSPHATE SERPL-MCNC: 4 MG/DL — SIGNIFICANT CHANGE UP (ref 2.5–4.5)
PLATELET # BLD AUTO: 131 K/UL — LOW (ref 150–400)
PLATELET # BLD AUTO: 131 K/UL — LOW (ref 150–400)
POTASSIUM SERPL-MCNC: 4.1 MMOL/L — SIGNIFICANT CHANGE UP (ref 3.5–5.3)
POTASSIUM SERPL-MCNC: 4.1 MMOL/L — SIGNIFICANT CHANGE UP (ref 3.5–5.3)
POTASSIUM SERPL-SCNC: 4.1 MMOL/L — SIGNIFICANT CHANGE UP (ref 3.5–5.3)
POTASSIUM SERPL-SCNC: 4.1 MMOL/L — SIGNIFICANT CHANGE UP (ref 3.5–5.3)
PROT SERPL-MCNC: 7 G/DL — SIGNIFICANT CHANGE UP (ref 6–8.3)
PROT SERPL-MCNC: 7 G/DL — SIGNIFICANT CHANGE UP (ref 6–8.3)
RBC # BLD: 3.28 M/UL — LOW (ref 3.8–5.2)
RBC # BLD: 3.28 M/UL — LOW (ref 3.8–5.2)
RBC # FLD: 19.3 % — HIGH (ref 10.3–14.5)
RBC # FLD: 19.3 % — HIGH (ref 10.3–14.5)
SODIUM SERPL-SCNC: 137 MMOL/L — SIGNIFICANT CHANGE UP (ref 135–145)
SODIUM SERPL-SCNC: 137 MMOL/L — SIGNIFICANT CHANGE UP (ref 135–145)
SPECIMEN SOURCE: SIGNIFICANT CHANGE UP
WBC # BLD: 5.84 K/UL — SIGNIFICANT CHANGE UP (ref 3.8–10.5)
WBC # BLD: 5.84 K/UL — SIGNIFICANT CHANGE UP (ref 3.8–10.5)
WBC # FLD AUTO: 5.84 K/UL — SIGNIFICANT CHANGE UP (ref 3.8–10.5)
WBC # FLD AUTO: 5.84 K/UL — SIGNIFICANT CHANGE UP (ref 3.8–10.5)

## 2023-11-11 PROCEDURE — 99233 SBSQ HOSP IP/OBS HIGH 50: CPT

## 2023-11-11 RX ORDER — LEVOTHYROXINE SODIUM 125 MCG
125 TABLET ORAL DAILY
Refills: 0 | Status: DISCONTINUED | OUTPATIENT
Start: 2023-11-11 | End: 2023-11-17

## 2023-11-11 RX ADMIN — CEFEPIME 100 MILLIGRAM(S): 1 INJECTION, POWDER, FOR SOLUTION INTRAMUSCULAR; INTRAVENOUS at 05:31

## 2023-11-11 RX ADMIN — Medication 1 TABLET(S): at 05:31

## 2023-11-11 RX ADMIN — Medication 6: at 11:51

## 2023-11-11 RX ADMIN — Medication 2 DROP(S): at 23:27

## 2023-11-11 RX ADMIN — Medication 15 MILLILITER(S): at 12:09

## 2023-11-11 RX ADMIN — CHLORHEXIDINE GLUCONATE 1 APPLICATION(S): 213 SOLUTION TOPICAL at 05:33

## 2023-11-11 RX ADMIN — ENOXAPARIN SODIUM 40 MILLIGRAM(S): 100 INJECTION SUBCUTANEOUS at 05:31

## 2023-11-11 RX ADMIN — Medication 2 DROP(S): at 18:17

## 2023-11-11 RX ADMIN — SIMETHICONE 80 MILLIGRAM(S): 80 TABLET, CHEWABLE ORAL at 12:10

## 2023-11-11 RX ADMIN — NYSTATIN CREAM 1 APPLICATION(S): 100000 CREAM TOPICAL at 05:33

## 2023-11-11 RX ADMIN — POLYETHYLENE GLYCOL 3350 17 GRAM(S): 17 POWDER, FOR SOLUTION ORAL at 05:31

## 2023-11-11 RX ADMIN — Medication 100 MICROGRAM(S): at 05:32

## 2023-11-11 RX ADMIN — NYSTATIN CREAM 1 APPLICATION(S): 100000 CREAM TOPICAL at 21:23

## 2023-11-11 RX ADMIN — SIMETHICONE 80 MILLIGRAM(S): 80 TABLET, CHEWABLE ORAL at 18:17

## 2023-11-11 RX ADMIN — Medication 1 TABLET(S): at 18:17

## 2023-11-11 RX ADMIN — LIDOCAINE 1 PATCH: 4 CREAM TOPICAL at 18:11

## 2023-11-11 RX ADMIN — ESCITALOPRAM OXALATE 10 MILLIGRAM(S): 10 TABLET, FILM COATED ORAL at 12:11

## 2023-11-11 RX ADMIN — Medication 500 MILLIGRAM(S): at 12:10

## 2023-11-11 RX ADMIN — SIMETHICONE 80 MILLIGRAM(S): 80 TABLET, CHEWABLE ORAL at 05:31

## 2023-11-11 RX ADMIN — Medication 1 TABLET(S): at 12:10

## 2023-11-11 RX ADMIN — Medication 2: at 18:19

## 2023-11-11 RX ADMIN — CEFEPIME 100 MILLIGRAM(S): 1 INJECTION, POWDER, FOR SOLUTION INTRAMUSCULAR; INTRAVENOUS at 21:24

## 2023-11-11 RX ADMIN — SIMETHICONE 80 MILLIGRAM(S): 80 TABLET, CHEWABLE ORAL at 23:26

## 2023-11-11 RX ADMIN — QUETIAPINE FUMARATE 12.5 MILLIGRAM(S): 200 TABLET, FILM COATED ORAL at 15:19

## 2023-11-11 RX ADMIN — Medication 5 MILLIGRAM(S): at 12:10

## 2023-11-11 RX ADMIN — ZINC SULFATE TAB 220 MG (50 MG ZINC EQUIVALENT) 220 MILLIGRAM(S): 220 (50 ZN) TAB at 12:11

## 2023-11-11 RX ADMIN — Medication 5 MILLIGRAM(S): at 05:32

## 2023-11-11 RX ADMIN — LIDOCAINE 1 PATCH: 4 CREAM TOPICAL at 12:10

## 2023-11-11 RX ADMIN — GABAPENTIN 100 MILLIGRAM(S): 400 CAPSULE ORAL at 21:24

## 2023-11-11 RX ADMIN — PANTOPRAZOLE SODIUM 40 MILLIGRAM(S): 20 TABLET, DELAYED RELEASE ORAL at 12:09

## 2023-11-11 RX ADMIN — CHLORHEXIDINE GLUCONATE 15 MILLILITER(S): 213 SOLUTION TOPICAL at 05:31

## 2023-11-11 RX ADMIN — Medication 2 DROP(S): at 12:11

## 2023-11-11 RX ADMIN — NYSTATIN CREAM 1 APPLICATION(S): 100000 CREAM TOPICAL at 15:15

## 2023-11-11 RX ADMIN — LIDOCAINE 1 PATCH: 4 CREAM TOPICAL at 23:17

## 2023-11-11 RX ADMIN — INSULIN GLARGINE 14 UNIT(S): 100 INJECTION, SOLUTION SUBCUTANEOUS at 06:14

## 2023-11-11 RX ADMIN — CHLORHEXIDINE GLUCONATE 15 MILLILITER(S): 213 SOLUTION TOPICAL at 18:17

## 2023-11-11 RX ADMIN — CEFEPIME 100 MILLIGRAM(S): 1 INJECTION, POWDER, FOR SOLUTION INTRAMUSCULAR; INTRAVENOUS at 15:19

## 2023-11-11 NOTE — PROGRESS NOTE ADULT - SUBJECTIVE AND OBJECTIVE BOX
RCU Progress Note     Interval Events:    Doing well. No acute overnight events.     REVIEW OF SYSTEMS:  [x] All other systems negative except per HPI   [ ] Unable to assess ROS because ________    OBJECTIVE:  ICU Vital Signs Last 24 Hrs  T(C): 37.1 (11 Nov 2023 11:47), Max: 37.7 (10 Nov 2023 17:28)  T(F): 98.7 (11 Nov 2023 11:47), Max: 99.9 (10 Nov 2023 17:28)  HR: 85 (11 Nov 2023 11:47) (67 - 85)  BP: 147/77 (11 Nov 2023 11:47) (130/70 - 149/69)  BP(mean): --  ABP: --  ABP(mean): --  RR: 18 (11 Nov 2023 11:47) (12 - 18)  SpO2: 100% (11 Nov 2023 11:47) (97% - 100%)    O2 Parameters below as of 11 Nov 2023 11:47  Patient On (Oxygen Delivery Method): ventilator          Mode: AC/ CMV (Assist Control/ Continuous Mandatory Ventilation), RR (machine): 12, TV (machine): 300, FiO2: 30, PEEP: 5, ITime: 0.6, MAP: 9, PIP: 25    11-10 @ 07:01 - 11-11 @ 07:00  --------------------------------------------------------  IN: 1560 mL / OUT: 1650 mL / NET: -90 mL    11-11 @ 07:01  -  11-11 @ 12:10  --------------------------------------------------------  IN: 0 mL / OUT: 400 mL / NET: -400 mL        PHYSICAL EXAM:  GENERAL: NAD, well-groomed, well-developed  HEAD:  Atraumatic, Normocephalic  EYES: EOMI, conjunctiva and sclera clear  ENMT: Trach to vent.   CHEST/LUNG: Clear to auscultation bilaterally   HEART: Regular rate and rhythm   ABDOMEN: G tube in place.  VASCULAR: No  cyanosis, or edema  SKIN: No rashes or lesions  NERVOUS SYSTEM:  At baseline    HOSPITAL MEDICATIONS:  MEDICATIONS  (STANDING):  artificial  tears Solution 2 Drop(s) Both EYES every 6 hours  ascorbic acid 500 milliGRAM(s) Oral daily  calcium carbonate    500 mG (Tums) Chewable 1 Tablet(s) Chew every 12 hours  cefepime   IVPB      cefepime   IVPB 2000 milliGRAM(s) IV Intermittent every 8 hours  chlorhexidine 0.12% Liquid 15 milliLiter(s) Oral Mucosa every 12 hours  chlorhexidine 4% Liquid 1 Application(s) Topical <User Schedule>  dextrose 50% Injectable 50 milliLiter(s) IV Push once  enoxaparin Injectable 40 milliGRAM(s) SubCutaneous every 24 hours  escitalopram 10 milliGRAM(s) Oral daily  fentaNYL   Patch  25 MICROgram(s)/Hr 1 Patch Transdermal every 72 hours  gabapentin Solution 100 milliGRAM(s) Enteral Tube at bedtime  insulin glargine Injectable (LANTUS) 14 Unit(s) SubCutaneous every morning  insulin lispro (ADMELOG) corrective regimen sliding scale   SubCutaneous every 6 hours  lactobacillus acidophilus 1 Tablet(s) Oral daily  levothyroxine 100 MICROGram(s) Enteral Tube <User Schedule>  lidocaine   4% Patch 1 Patch Transdermal daily  multivitamin/minerals/iron Oral Solution (CENTRUM) 15 milliLiter(s) Oral daily  nystatin Powder 1 Application(s) Topical every 8 hours  oxybutynin 5 milliGRAM(s) Oral daily  pantoprazole   Suspension 40 milliGRAM(s) Enteral Tube daily  polyethylene glycol 3350 17 Gram(s) Oral two times a day  predniSONE   Tablet 5 milliGRAM(s) Oral daily  QUEtiapine 12.5 milliGRAM(s) Oral <User Schedule>  simethicone 80 milliGRAM(s) Chew four times a day  zinc sulfate 220 milliGRAM(s) Oral daily    MEDICATIONS  (PRN):  acetaminophen   Oral Liquid .. 1000 milliGRAM(s) Enteral Tube every 6 hours PRN Temp greater or equal to 38C (100.4F), Mild Pain (1 - 3)  albuterol/ipratropium for Nebulization 3 milliLiter(s) Nebulizer every 6 hours PRN Bronchospasm  diphenhydrAMINE Elixir 25 milliGRAM(s) Oral every 6 hours PRN Rash and/or Itching  oxyCODONE    Solution 2.5 milliGRAM(s) Oral every 6 hours PRN Moderate Pain (4 - 6)  sodium chloride 0.65% Nasal 1 Spray(s) Both Nostrils every 6 hours PRN Nasal Congestion      LABS:  11-11    137  |  102  |  27<H>  ----------------------------<  135<H>  4.1   |  26  |  <0.30<L>  11-09    135  |  101  |  21  ----------------------------<  133<H>  4.0   |  25  |  <0.30<L>    Ca    9.2      11 Nov 2023 06:37  Ca    9.4      09 Nov 2023 06:58  Phos  4.0     11-11  Mg     1.8     11-11    TPro  7.0  /  Alb  3.1<L>  /  TBili  0.4  /  DBili  x   /  AST  27  /  ALT  27  /  AlkPhos  47  11-11    Magnesium: 1.8 mg/dL (11-11-23 @ 06:37)  Magnesium: 1.9 mg/dL (11-09-23 @ 06:58)    Phosphorus: 4.0 mg/dL (11-11-23 @ 06:37)  Phosphorus: 3.6 mg/dL (11-09-23 @ 06:58)                    Urinalysis Basic - ( 11 Nov 2023 06:37 )    Color: x / Appearance: x / SG: x / pH: x  Gluc: 135 mg/dL / Ketone: x  / Bili: x / Urobili: x   Blood: x / Protein: x / Nitrite: x   Leuk Esterase: x / RBC: x / WBC x   Sq Epi: x / Non Sq Epi: x / Bacteria: x                              9.2    5.84  )-----------( 131      ( 11 Nov 2023 06:35 )             31.3                         8.6    6.74  )-----------( 121      ( 09 Nov 2023 06:58 )             30.0     CAPILLARY BLOOD GLUCOSE      POCT Blood Glucose.: 276 mg/dL (11 Nov 2023 11:37)  POCT Blood Glucose.: 137 mg/dL (11 Nov 2023 06:09)  POCT Blood Glucose.: 220 mg/dL (10 Nov 2023 23:38)  POCT Blood Glucose.: 161 mg/dL (10 Nov 2023 18:04)     RCU Progress Note     Interval Events: Doing well. No acute overnight events.     REVIEW OF SYSTEMS:  [x] All other systems negative except per HPI   [ ] Unable to assess ROS because ________    OBJECTIVE:  ICU Vital Signs Last 24 Hrs  T(C): 37.1 (11 Nov 2023 11:47), Max: 37.7 (10 Nov 2023 17:28)  T(F): 98.7 (11 Nov 2023 11:47), Max: 99.9 (10 Nov 2023 17:28)  HR: 85 (11 Nov 2023 11:47) (67 - 85)  BP: 147/77 (11 Nov 2023 11:47) (130/70 - 149/69)  BP(mean): --  ABP: --  ABP(mean): --  RR: 18 (11 Nov 2023 11:47) (12 - 18)  SpO2: 100% (11 Nov 2023 11:47) (97% - 100%)    O2 Parameters below as of 11 Nov 2023 11:47  Patient On (Oxygen Delivery Method): ventilator          Mode: AC/ CMV (Assist Control/ Continuous Mandatory Ventilation), RR (machine): 12, TV (machine): 300, FiO2: 30, PEEP: 5, ITime: 0.6, MAP: 9, PIP: 25    11-10 @ 07:01 - 11-11 @ 07:00  --------------------------------------------------------  IN: 1560 mL / OUT: 1650 mL / NET: -90 mL    11-11 @ 07:01  -  11-11 @ 12:10  --------------------------------------------------------  IN: 0 mL / OUT: 400 mL / NET: -400 mL        PHYSICAL EXAM:  GENERAL: NAD, well-groomed, well-developed  HEAD:  Atraumatic, Normocephalic  EYES: EOMI, conjunctiva and sclera clear  ENMT: Trach to vent.   CHEST/LUNG: Clear to auscultation bilaterally   HEART: Regular rate and rhythm   ABDOMEN: G tube in place.  VASCULAR: No  cyanosis, or edema  SKIN: No rashes or lesions  NERVOUS SYSTEM:  At baseline    HOSPITAL MEDICATIONS:  MEDICATIONS  (STANDING):  artificial  tears Solution 2 Drop(s) Both EYES every 6 hours  ascorbic acid 500 milliGRAM(s) Oral daily  calcium carbonate    500 mG (Tums) Chewable 1 Tablet(s) Chew every 12 hours  cefepime   IVPB      cefepime   IVPB 2000 milliGRAM(s) IV Intermittent every 8 hours  chlorhexidine 0.12% Liquid 15 milliLiter(s) Oral Mucosa every 12 hours  chlorhexidine 4% Liquid 1 Application(s) Topical <User Schedule>  dextrose 50% Injectable 50 milliLiter(s) IV Push once  enoxaparin Injectable 40 milliGRAM(s) SubCutaneous every 24 hours  escitalopram 10 milliGRAM(s) Oral daily  fentaNYL   Patch  25 MICROgram(s)/Hr 1 Patch Transdermal every 72 hours  gabapentin Solution 100 milliGRAM(s) Enteral Tube at bedtime  insulin glargine Injectable (LANTUS) 14 Unit(s) SubCutaneous every morning  insulin lispro (ADMELOG) corrective regimen sliding scale   SubCutaneous every 6 hours  lactobacillus acidophilus 1 Tablet(s) Oral daily  levothyroxine 100 MICROGram(s) Enteral Tube <User Schedule>  lidocaine   4% Patch 1 Patch Transdermal daily  multivitamin/minerals/iron Oral Solution (CENTRUM) 15 milliLiter(s) Oral daily  nystatin Powder 1 Application(s) Topical every 8 hours  oxybutynin 5 milliGRAM(s) Oral daily  pantoprazole   Suspension 40 milliGRAM(s) Enteral Tube daily  polyethylene glycol 3350 17 Gram(s) Oral two times a day  predniSONE   Tablet 5 milliGRAM(s) Oral daily  QUEtiapine 12.5 milliGRAM(s) Oral <User Schedule>  simethicone 80 milliGRAM(s) Chew four times a day  zinc sulfate 220 milliGRAM(s) Oral daily    MEDICATIONS  (PRN):  acetaminophen   Oral Liquid .. 1000 milliGRAM(s) Enteral Tube every 6 hours PRN Temp greater or equal to 38C (100.4F), Mild Pain (1 - 3)  albuterol/ipratropium for Nebulization 3 milliLiter(s) Nebulizer every 6 hours PRN Bronchospasm  diphenhydrAMINE Elixir 25 milliGRAM(s) Oral every 6 hours PRN Rash and/or Itching  oxyCODONE    Solution 2.5 milliGRAM(s) Oral every 6 hours PRN Moderate Pain (4 - 6)  sodium chloride 0.65% Nasal 1 Spray(s) Both Nostrils every 6 hours PRN Nasal Congestion      LABS:  11-11    137  |  102  |  27<H>  ----------------------------<  135<H>  4.1   |  26  |  <0.30<L>  11-09    135  |  101  |  21  ----------------------------<  133<H>  4.0   |  25  |  <0.30<L>    Ca    9.2      11 Nov 2023 06:37  Ca    9.4      09 Nov 2023 06:58  Phos  4.0     11-11  Mg     1.8     11-11    TPro  7.0  /  Alb  3.1<L>  /  TBili  0.4  /  DBili  x   /  AST  27  /  ALT  27  /  AlkPhos  47  11-11    Magnesium: 1.8 mg/dL (11-11-23 @ 06:37)  Magnesium: 1.9 mg/dL (11-09-23 @ 06:58)    Phosphorus: 4.0 mg/dL (11-11-23 @ 06:37)  Phosphorus: 3.6 mg/dL (11-09-23 @ 06:58)                    Urinalysis Basic - ( 11 Nov 2023 06:37 )    Color: x / Appearance: x / SG: x / pH: x  Gluc: 135 mg/dL / Ketone: x  / Bili: x / Urobili: x   Blood: x / Protein: x / Nitrite: x   Leuk Esterase: x / RBC: x / WBC x   Sq Epi: x / Non Sq Epi: x / Bacteria: x                              9.2    5.84  )-----------( 131      ( 11 Nov 2023 06:35 )             31.3                         8.6    6.74  )-----------( 121      ( 09 Nov 2023 06:58 )             30.0     CAPILLARY BLOOD GLUCOSE      POCT Blood Glucose.: 276 mg/dL (11 Nov 2023 11:37)  POCT Blood Glucose.: 137 mg/dL (11 Nov 2023 06:09)  POCT Blood Glucose.: 220 mg/dL (10 Nov 2023 23:38)  POCT Blood Glucose.: 161 mg/dL (10 Nov 2023 18:04)

## 2023-11-11 NOTE — PROGRESS NOTE ADULT - SUBJECTIVE AND OBJECTIVE BOX
Patient is a 73y old  Female who presents with a chief complaint of 74 y/o F with PMHx of T2DM, HTN, HLD, anemia, hypothyroidism, RA, fibromyalgia, remote cerebral aneurysm repair, acute hypercapnic respiratory failure now with tracheostomy, PEG tube, and bedbound (trach change 8/18, PEG change 8/14), sacral pressure ulcer, BIBEMS from home for 6 day hx of bleeding from the tracheostomy and PEG tube with associated abdominal pain, recent history of auditory and visual hallucinations, and with chronic sacral decubitus ulcer.    (29 Oct 2023 14:01)      SUBJECTIVE / OVERNIGHT EVENTS:    MEDICATIONS  (STANDING):  artificial  tears Solution 2 Drop(s) Both EYES every 6 hours  ascorbic acid 500 milliGRAM(s) Oral daily  calcium carbonate    500 mG (Tums) Chewable 1 Tablet(s) Chew every 12 hours  cefepime   IVPB      cefepime   IVPB 2000 milliGRAM(s) IV Intermittent every 8 hours  chlorhexidine 0.12% Liquid 15 milliLiter(s) Oral Mucosa every 12 hours  chlorhexidine 4% Liquid 1 Application(s) Topical <User Schedule>  dextrose 50% Injectable 50 milliLiter(s) IV Push once  enoxaparin Injectable 40 milliGRAM(s) SubCutaneous every 24 hours  escitalopram 10 milliGRAM(s) Oral daily  fentaNYL   Patch  25 MICROgram(s)/Hr 1 Patch Transdermal every 72 hours  gabapentin Solution 100 milliGRAM(s) Enteral Tube at bedtime  insulin glargine Injectable (LANTUS) 14 Unit(s) SubCutaneous every morning  insulin lispro (ADMELOG) corrective regimen sliding scale   SubCutaneous every 6 hours  lactobacillus acidophilus 1 Tablet(s) Oral daily  levothyroxine 125 MICROGram(s) Oral daily  lidocaine   4% Patch 1 Patch Transdermal daily  multivitamin/minerals/iron Oral Solution (CENTRUM) 15 milliLiter(s) Oral daily  nystatin Powder 1 Application(s) Topical every 8 hours  oxybutynin 5 milliGRAM(s) Oral daily  pantoprazole   Suspension 40 milliGRAM(s) Enteral Tube daily  polyethylene glycol 3350 17 Gram(s) Oral two times a day  predniSONE   Tablet 5 milliGRAM(s) Oral daily  QUEtiapine 12.5 milliGRAM(s) Oral <User Schedule>  simethicone 80 milliGRAM(s) Chew four times a day  zinc sulfate 220 milliGRAM(s) Oral daily    MEDICATIONS  (PRN):  acetaminophen   Oral Liquid .. 1000 milliGRAM(s) Enteral Tube every 6 hours PRN Temp greater or equal to 38C (100.4F), Mild Pain (1 - 3)  albuterol/ipratropium for Nebulization 3 milliLiter(s) Nebulizer every 6 hours PRN Bronchospasm  diphenhydrAMINE Elixir 25 milliGRAM(s) Oral every 6 hours PRN Rash and/or Itching  oxyCODONE    Solution 2.5 milliGRAM(s) Oral every 6 hours PRN Moderate Pain (4 - 6)  sodium chloride 0.65% Nasal 1 Spray(s) Both Nostrils every 6 hours PRN Nasal Congestion      Vital Signs Last 24 Hrs  T(F): 98.7 (11-11-23 @ 11:47), Max: 98.7 (11-11-23 @ 11:47)  HR: 77 (11-11-23 @ 15:40) (67 - 85)  BP: 147/77 (11-11-23 @ 11:47) (130/70 - 147/77)  RR: 18 (11-11-23 @ 11:47) (12 - 18)  SpO2: 100% (11-11-23 @ 15:40) (98% - 100%)  Telemetry:   CAPILLARY BLOOD GLUCOSE      POCT Blood Glucose.: 151 mg/dL (11 Nov 2023 18:00)  POCT Blood Glucose.: 276 mg/dL (11 Nov 2023 11:37)  POCT Blood Glucose.: 137 mg/dL (11 Nov 2023 06:09)  POCT Blood Glucose.: 220 mg/dL (10 Nov 2023 23:38)    I&O's Summary    10 Nov 2023 07:01  -  11 Nov 2023 07:00  --------------------------------------------------------  IN: 1560 mL / OUT: 1650 mL / NET: -90 mL    11 Nov 2023 07:01  -  11 Nov 2023 20:00  --------------------------------------------------------  IN: 780 mL / OUT: 600 mL / NET: 180 mL        PHYSICAL EXAM:  GENERAL: NAD, well-developed  HEAD:  Atraumatic, Normocephalic  EYES: EOMI, PERRLA, conjunctiva and sclera clear  NECK: Supple, No JVD  CHEST/LUNG: Clear to auscultation bilaterally; No wheeze  HEART: Regular rate and rhythm; No murmurs, rubs, or gallops  ABDOMEN: Soft, Nontender, Nondistended; Bowel sounds present  EXTREMITIES:  2+ Peripheral Pulses, No clubbing, cyanosis, or edema  PSYCH: AAOx3  NEUROLOGY: non-focal  SKIN: No rashes or lesions    LABS:                        9.2    5.84  )-----------( 131      ( 11 Nov 2023 06:35 )             31.3     11-11    137  |  102  |  27<H>  ----------------------------<  135<H>  4.1   |  26  |  <0.30<L>    Ca    9.2      11 Nov 2023 06:37  Phos  4.0     11-11  Mg     1.8     11-11    TPro  7.0  /  Alb  3.1<L>  /  TBili  0.4  /  DBili  x   /  AST  27  /  ALT  27  /  AlkPhos  47  11-11          Urinalysis Basic - ( 11 Nov 2023 06:37 )    Color: x / Appearance: x / SG: x / pH: x  Gluc: 135 mg/dL / Ketone: x  / Bili: x / Urobili: x   Blood: x / Protein: x / Nitrite: x   Leuk Esterase: x / RBC: x / WBC x   Sq Epi: x / Non Sq Epi: x / Bacteria: x

## 2023-11-11 NOTE — PROGRESS NOTE ADULT - SUBJECTIVE AND OBJECTIVE BOX
INTERVAL HPI/OVERNIGHT EVENTS:    no events            acetaminophen   Oral Liquid .. 1000 milliGRAM(s) Enteral Tube every 6 hours PRN  albuterol/ipratropium for Nebulization 3 milliLiter(s) Nebulizer every 6 hours PRN  artificial  tears Solution 2 Drop(s) Both EYES every 6 hours  ascorbic acid 500 milliGRAM(s) Oral daily  calcium carbonate    500 mG (Tums) Chewable 1 Tablet(s) Chew every 12 hours  cefepime   IVPB      cefepime   IVPB 2000 milliGRAM(s) IV Intermittent every 8 hours  chlorhexidine 0.12% Liquid 15 milliLiter(s) Oral Mucosa every 12 hours  chlorhexidine 4% Liquid 1 Application(s) Topical <User Schedule>  dextrose 50% Injectable 50 milliLiter(s) IV Push once  diphenhydrAMINE Elixir 25 milliGRAM(s) Oral every 6 hours PRN  enoxaparin Injectable 40 milliGRAM(s) SubCutaneous every 24 hours  escitalopram 10 milliGRAM(s) Oral daily  fentaNYL   Patch  25 MICROgram(s)/Hr 1 Patch Transdermal every 72 hours  gabapentin Solution 100 milliGRAM(s) Enteral Tube at bedtime  insulin glargine Injectable (LANTUS) 14 Unit(s) SubCutaneous every morning  insulin lispro (ADMELOG) corrective regimen sliding scale   SubCutaneous every 6 hours  lactobacillus acidophilus 1 Tablet(s) Oral daily  levothyroxine 100 MICROGram(s) Enteral Tube <User Schedule>  lidocaine   4% Patch 1 Patch Transdermal daily  multivitamin/minerals/iron Oral Solution (CENTRUM) 15 milliLiter(s) Oral daily  nystatin Powder 1 Application(s) Topical every 8 hours  oxybutynin 5 milliGRAM(s) Oral daily  oxyCODONE    Solution 2.5 milliGRAM(s) Oral every 6 hours PRN  pantoprazole   Suspension 40 milliGRAM(s) Enteral Tube daily  polyethylene glycol 3350 17 Gram(s) Oral two times a day  predniSONE   Tablet 5 milliGRAM(s) Oral daily  QUEtiapine 12.5 milliGRAM(s) Oral <User Schedule>  simethicone 80 milliGRAM(s) Chew four times a day  sodium chloride 0.65% Nasal 1 Spray(s) Both Nostrils every 6 hours PRN  zinc sulfate 220 milliGRAM(s) Oral daily                            9.2    5.84  )-----------( 131      ( 11 Nov 2023 06:35 )             31.3       Hemoglobin: 9.2 g/dL (11-11 @ 06:35)  Hemoglobin: 8.6 g/dL (11-09 @ 06:58)      11-11    137  |  102  |  27<H>  ----------------------------<  135<H>  4.1   |  26  |  <0.30<L>    Ca    9.2      11 Nov 2023 06:37  Phos  4.0     11-11  Mg     1.8     11-11    TPro  7.0  /  Alb  3.1<L>  /  TBili  0.4  /  DBili  x   /  AST  27  /  ALT  27  /  AlkPhos  47  11-11    Creatinine Trend: <0.30<--, <0.30<--, <0.30<--, <0.30<--, <0.30<--, <0.30<--    COAGS:           T(C): 37 (11-11-23 @ 04:29), Max: 37.7 (11-10-23 @ 17:28)  HR: 70 (11-11-23 @ 09:33) (67 - 83)  BP: 130/70 (11-11-23 @ 04:29) (130/70 - 149/69)  RR: 13 (11-11-23 @ 04:29) (12 - 18)  SpO2: 100% (11-11-23 @ 09:33) (97% - 100%)  Wt(kg): --    I&O's Summary    10 Nov 2023 07:01  -  11 Nov 2023 07:00  --------------------------------------------------------  IN: 1560 mL / OUT: 1650 mL / NET: -90 mL         Review of Systems:  unable to obtain     PHYSICAL EXAM:    Constitutional: NAD  HEENT: EOMI, throat clear  Neck: No LAD, supple  Respiratory: CTA and P  Cardiovascular: S1 and S2, RRR, no M  Gastrointestinal: BS+, soft, NT/ND, neg HSM, +peg c/d/i  Extremities: No peripheral edema, neg clubbing, cyanosis  Vascular: 2+ peripheral pulses  Neurological: A/O   Psychiatric: Normal mood, normal affect  Skin: No rashes

## 2023-11-11 NOTE — PROGRESS NOTE ADULT - ASSESSMENT
72 y/o F with PMHx of T2DM, HTN, HLD, anemia, hypothyroidism, RA, fibromyalgia, remote cerebral aneurysm repair, acute hypercapnic respiratory failure now with tracheostomy, PEG tube, and bedbound (trach change 8/18, PEG change 8/14), sacral pressure ulcer, BIBEMS from home for 6 day hx of bleeding from the tracheostomy and PEG tube with associated abdominal pain, recent history of auditory and visual hallucinations, and with chronic sacral decubitus ulcer. Exam notable for mild abdominal distention with LLQ and RLQ tenderness, tracheostomy in place with no obvious bleeding, PEG tube with scant amount of dried blood, at baseline neurologic status. Imaging showing no active bleeding around tracheostomy site, however was notable for mild thickening along PEG tube tract, sacral ulcer extending to the coccyx suggestive of possible osteomyelitis, and bibasilar patchy lung opacities with volume loss. Patient admitted for respiratory support, wound care of tracheostomy and PEG, and management of presumed sacral osteomyelitis.    11/8: No fevers overnight, Spu Cx 11/6: + PSA and Serratia, sensitivities pending; Abx changed to Cefepime while pending speciation. Psych as to follow up with daughter per request. F/u AM Labs.  11/9:  TSH levels elevated, will repeat with T4 tomorrow morning.  Daughter expresses concerns about hallucinations.  Seroquel reduced back to once a daily.  Oxycodone dose reduced to 2.5mg PRN.   Sputum growing serratia and pseudomonas, awaiting further sensitivities.  11/10:  Pseudomonas and Serratia both sensitive to Cefepime.  Patient complained of vaginal itiching, will send UA, GC probe.  11/11: no acute events.

## 2023-11-11 NOTE — PROGRESS NOTE ADULT - NS ATTEND AMEND GEN_ALL_CORE FT
73F with a h/o DM, HTN, HLD, anemia, hypothyroidism, RA, fibromyalgia, remote cerebral aneursym with repair, hypercapnic respiratory failure s/p tracheostomy/PEG, bedbound, sacral pressure ulcer. Admitted w/ bleeding from tracheostomy and PEG w/ associated abdominal pain, recent hx of auditory/visual hallucinations. Since admission, no further bleeding noted from either trach or PEG. Imaging showed mild thickening along PEG tube tract and sacral ulcer extending to coccyx suggestive of OM.     MRI with osteomyelitis  Repeat sputum now with serratia, increased secretions back on abx.  Improving delirium but with occasional hallucinations.     # Osteomyelitis  # Chronic hypoxemic RF  # oropharyngeal dysphagia  # acute on chronic pain  # Trach/Peg bleeding  # Tracheitis with PA and serratia  # Hx of hallucination, anxiety    - MRI showing sacral Osteo, on abx per ID, c/w wound care, no plans for surgery. Will need prolonged course of abx  - PS 10/5 during the day, vent at night, and will need vent on discharge  - Repeat sputum culture, with PA and serratia ,PA maybe colonization but serratia appears new. C/W cefepime. Will f/u with ID and sensitivity.   - family requested S/S eval but unable to given dependence on vent  - C/W pain control, multifactorial 2/2 to fibromyalgia as well as now osteo with pressure ulcer. Will decrease dose of pain meds.   - Peg irritation seen by GI, no revs for further interventions. H/H stable  - Seen by  for delirium and hallucinations. recommend Seroquel for delirium. Family requesting re-eval, was told psych would follow up. Will reconsult when patient is able to participate. Will decrease Seroquel as patient is sedated during the day.   - Urinalysis negative   - DVT ppx- lovenox  - Dispo- full code.   Pending preparation to discharge home on vent. 73F with a h/o DM, HTN, HLD, anemia, hypothyroidism, RA, fibromyalgia, remote cerebral aneursym with repair, hypercapnic respiratory failure s/p tracheostomy/PEG, bedbound, sacral pressure ulcer. Admitted w/ bleeding from tracheostomy and PEG w/ associated abdominal pain, recent hx of auditory/visual hallucinations. Since admission, no further bleeding noted from either trach or PEG. Imaging showed mild thickening along PEG tube tract and sacral ulcer extending to coccyx suggestive of OM.     MRI with osteomyelitis  Repeat sputum now with serratia, increased secretions back on abx.  Improving delirium but with occasional hallucinations.     # Osteomyelitis  # Chronic hypoxemic RF  # oropharyngeal dysphagia  # acute on chronic pain  # Trach/Peg bleeding  # Tracheitis with PA and serratia  # Hx of hallucination, anxiety    - MRI showing sacral Osteo, on abx per ID, c/w wound care, no plans for surgery. Will need prolonged course of abx  - PS 10/5 during the day, vent at night, and will need vent on discharge  - Repeat sputum culture, with PA and serratia ,PA maybe colonization but serratia appears new. C/W cefepime. Will f/u with ID and sensitivity.   - family requested S/S eval but unable to given dependence on vent  - C/W pain control, multifactorial 2/2 to fibromyalgia as well as now osteo with pressure ulcer. Will decrease dose of pain meds.   - Peg irritation seen by GI, no revs for further interventions. H/H stable  - Seen by  for delirium and hallucination and appreciate follow up - will c/w Seroquel 25 mg QHS  - Urinalysis negative   - DVT ppx- lovenox  - Dispo- full code.   Pending preparation to discharge home on vent.

## 2023-11-11 NOTE — PROGRESS NOTE ADULT - ASSESSMENT
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 Bleeding from gastrostomy. Site appears macerated. Multifactorial, possible the PEG has been too tight at home.  -PEG c/d/i   -no further bleeding  -wound care following    2. Leakage at PEG site.   -no further leaking     3. Abdominal pain. CT negative for acute pathology.  -miralax BID    4. Respiratory   per RCU

## 2023-11-11 NOTE — PROGRESS NOTE ADULT - PROBLEM SELECTOR PLAN 2
On chronic vent with trach    Hypothyroidism: T4 is slightly low. and tsh is elevated. will raise synthroid to 125 mcg.

## 2023-11-12 LAB
APPEARANCE UR: CLEAR — SIGNIFICANT CHANGE UP
APPEARANCE UR: CLEAR — SIGNIFICANT CHANGE UP
BACTERIA # UR AUTO: NEGATIVE /HPF — SIGNIFICANT CHANGE UP
BACTERIA # UR AUTO: NEGATIVE /HPF — SIGNIFICANT CHANGE UP
BILIRUB UR-MCNC: NEGATIVE — SIGNIFICANT CHANGE UP
BILIRUB UR-MCNC: NEGATIVE — SIGNIFICANT CHANGE UP
CAST: 0 /LPF — SIGNIFICANT CHANGE UP (ref 0–4)
CAST: 0 /LPF — SIGNIFICANT CHANGE UP (ref 0–4)
COLOR SPEC: YELLOW — SIGNIFICANT CHANGE UP
COLOR SPEC: YELLOW — SIGNIFICANT CHANGE UP
DIFF PNL FLD: NEGATIVE — SIGNIFICANT CHANGE UP
DIFF PNL FLD: NEGATIVE — SIGNIFICANT CHANGE UP
GLUCOSE BLDC GLUCOMTR-MCNC: 131 MG/DL — HIGH (ref 70–99)
GLUCOSE BLDC GLUCOMTR-MCNC: 131 MG/DL — HIGH (ref 70–99)
GLUCOSE BLDC GLUCOMTR-MCNC: 161 MG/DL — HIGH (ref 70–99)
GLUCOSE BLDC GLUCOMTR-MCNC: 161 MG/DL — HIGH (ref 70–99)
GLUCOSE BLDC GLUCOMTR-MCNC: 183 MG/DL — HIGH (ref 70–99)
GLUCOSE BLDC GLUCOMTR-MCNC: 183 MG/DL — HIGH (ref 70–99)
GLUCOSE BLDC GLUCOMTR-MCNC: 253 MG/DL — HIGH (ref 70–99)
GLUCOSE BLDC GLUCOMTR-MCNC: 253 MG/DL — HIGH (ref 70–99)
GLUCOSE UR QL: NEGATIVE MG/DL — SIGNIFICANT CHANGE UP
GLUCOSE UR QL: NEGATIVE MG/DL — SIGNIFICANT CHANGE UP
KETONES UR-MCNC: NEGATIVE MG/DL — SIGNIFICANT CHANGE UP
KETONES UR-MCNC: NEGATIVE MG/DL — SIGNIFICANT CHANGE UP
LEUKOCYTE ESTERASE UR-ACNC: NEGATIVE — SIGNIFICANT CHANGE UP
LEUKOCYTE ESTERASE UR-ACNC: NEGATIVE — SIGNIFICANT CHANGE UP
NITRITE UR-MCNC: NEGATIVE — SIGNIFICANT CHANGE UP
NITRITE UR-MCNC: NEGATIVE — SIGNIFICANT CHANGE UP
PH UR: 7 — SIGNIFICANT CHANGE UP (ref 5–8)
PH UR: 7 — SIGNIFICANT CHANGE UP (ref 5–8)
PROT UR-MCNC: 30 MG/DL
PROT UR-MCNC: 30 MG/DL
RBC CASTS # UR COMP ASSIST: 4 /HPF — SIGNIFICANT CHANGE UP (ref 0–4)
RBC CASTS # UR COMP ASSIST: 4 /HPF — SIGNIFICANT CHANGE UP (ref 0–4)
SP GR SPEC: 1.02 — SIGNIFICANT CHANGE UP (ref 1–1.03)
SP GR SPEC: 1.02 — SIGNIFICANT CHANGE UP (ref 1–1.03)
SQUAMOUS # UR AUTO: 1 /HPF — SIGNIFICANT CHANGE UP (ref 0–5)
SQUAMOUS # UR AUTO: 1 /HPF — SIGNIFICANT CHANGE UP (ref 0–5)
UROBILINOGEN FLD QL: 0.2 MG/DL — SIGNIFICANT CHANGE UP (ref 0.2–1)
UROBILINOGEN FLD QL: 0.2 MG/DL — SIGNIFICANT CHANGE UP (ref 0.2–1)
WBC UR QL: 0 /HPF — SIGNIFICANT CHANGE UP (ref 0–5)
WBC UR QL: 0 /HPF — SIGNIFICANT CHANGE UP (ref 0–5)

## 2023-11-12 PROCEDURE — 99233 SBSQ HOSP IP/OBS HIGH 50: CPT

## 2023-11-12 PROCEDURE — 71045 X-RAY EXAM CHEST 1 VIEW: CPT | Mod: 26

## 2023-11-12 RX ORDER — IPRATROPIUM/ALBUTEROL SULFATE 18-103MCG
3 AEROSOL WITH ADAPTER (GRAM) INHALATION EVERY 6 HOURS
Refills: 0 | Status: DISCONTINUED | OUTPATIENT
Start: 2023-11-12 | End: 2024-01-06

## 2023-11-12 RX ORDER — SODIUM CHLORIDE 9 MG/ML
4 INJECTION INTRAMUSCULAR; INTRAVENOUS; SUBCUTANEOUS EVERY 12 HOURS
Refills: 0 | Status: DISCONTINUED | OUTPATIENT
Start: 2023-11-12 | End: 2023-12-13

## 2023-11-12 RX ADMIN — Medication 1 APPLICATION(S): at 11:34

## 2023-11-12 RX ADMIN — ESCITALOPRAM OXALATE 10 MILLIGRAM(S): 10 TABLET, FILM COATED ORAL at 11:34

## 2023-11-12 RX ADMIN — Medication 2 DROP(S): at 11:33

## 2023-11-12 RX ADMIN — CHLORHEXIDINE GLUCONATE 1 APPLICATION(S): 213 SOLUTION TOPICAL at 05:21

## 2023-11-12 RX ADMIN — Medication 1 TABLET(S): at 11:34

## 2023-11-12 RX ADMIN — NYSTATIN CREAM 1 APPLICATION(S): 100000 CREAM TOPICAL at 05:22

## 2023-11-12 RX ADMIN — Medication 125 MICROGRAM(S): at 04:20

## 2023-11-12 RX ADMIN — Medication 15 MILLILITER(S): at 11:33

## 2023-11-12 RX ADMIN — Medication 3 MILLILITER(S): at 11:12

## 2023-11-12 RX ADMIN — Medication 3 MILLILITER(S): at 17:29

## 2023-11-12 RX ADMIN — CHLORHEXIDINE GLUCONATE 15 MILLILITER(S): 213 SOLUTION TOPICAL at 05:20

## 2023-11-12 RX ADMIN — POLYETHYLENE GLYCOL 3350 17 GRAM(S): 17 POWDER, FOR SOLUTION ORAL at 17:31

## 2023-11-12 RX ADMIN — Medication 6: at 12:59

## 2023-11-12 RX ADMIN — Medication 2 DROP(S): at 23:42

## 2023-11-12 RX ADMIN — Medication 3 MILLILITER(S): at 23:37

## 2023-11-12 RX ADMIN — Medication 5 MILLIGRAM(S): at 11:34

## 2023-11-12 RX ADMIN — Medication 1 APPLICATION(S): at 17:32

## 2023-11-12 RX ADMIN — SIMETHICONE 80 MILLIGRAM(S): 80 TABLET, CHEWABLE ORAL at 05:25

## 2023-11-12 RX ADMIN — Medication 2: at 00:02

## 2023-11-12 RX ADMIN — Medication 1 TABLET(S): at 05:20

## 2023-11-12 RX ADMIN — Medication 1000 MILLIGRAM(S): at 05:47

## 2023-11-12 RX ADMIN — SIMETHICONE 80 MILLIGRAM(S): 80 TABLET, CHEWABLE ORAL at 17:31

## 2023-11-12 RX ADMIN — CHLORHEXIDINE GLUCONATE 15 MILLILITER(S): 213 SOLUTION TOPICAL at 17:31

## 2023-11-12 RX ADMIN — CEFEPIME 100 MILLIGRAM(S): 1 INJECTION, POWDER, FOR SOLUTION INTRAMUSCULAR; INTRAVENOUS at 21:55

## 2023-11-12 RX ADMIN — CEFEPIME 100 MILLIGRAM(S): 1 INJECTION, POWDER, FOR SOLUTION INTRAMUSCULAR; INTRAVENOUS at 14:29

## 2023-11-12 RX ADMIN — ZINC SULFATE TAB 220 MG (50 MG ZINC EQUIVALENT) 220 MILLIGRAM(S): 220 (50 ZN) TAB at 11:33

## 2023-11-12 RX ADMIN — Medication 1000 MILLIGRAM(S): at 06:47

## 2023-11-12 RX ADMIN — NYSTATIN CREAM 1 APPLICATION(S): 100000 CREAM TOPICAL at 14:30

## 2023-11-12 RX ADMIN — GABAPENTIN 100 MILLIGRAM(S): 400 CAPSULE ORAL at 21:55

## 2023-11-12 RX ADMIN — PANTOPRAZOLE SODIUM 40 MILLIGRAM(S): 20 TABLET, DELAYED RELEASE ORAL at 11:34

## 2023-11-12 RX ADMIN — ENOXAPARIN SODIUM 40 MILLIGRAM(S): 100 INJECTION SUBCUTANEOUS at 04:17

## 2023-11-12 RX ADMIN — LIDOCAINE 1 PATCH: 4 CREAM TOPICAL at 14:29

## 2023-11-12 RX ADMIN — Medication 1 APPLICATION(S): at 23:42

## 2023-11-12 RX ADMIN — Medication 2 DROP(S): at 05:21

## 2023-11-12 RX ADMIN — CEFEPIME 100 MILLIGRAM(S): 1 INJECTION, POWDER, FOR SOLUTION INTRAMUSCULAR; INTRAVENOUS at 05:20

## 2023-11-12 RX ADMIN — SIMETHICONE 80 MILLIGRAM(S): 80 TABLET, CHEWABLE ORAL at 11:48

## 2023-11-12 RX ADMIN — Medication 2: at 17:30

## 2023-11-12 RX ADMIN — Medication 5 MILLIGRAM(S): at 05:25

## 2023-11-12 RX ADMIN — LIDOCAINE 1 PATCH: 4 CREAM TOPICAL at 19:33

## 2023-11-12 RX ADMIN — NYSTATIN CREAM 1 APPLICATION(S): 100000 CREAM TOPICAL at 21:55

## 2023-11-12 RX ADMIN — Medication 500 MILLIGRAM(S): at 11:34

## 2023-11-12 RX ADMIN — Medication 1 TABLET(S): at 17:31

## 2023-11-12 RX ADMIN — Medication 2 DROP(S): at 17:31

## 2023-11-12 RX ADMIN — SIMETHICONE 80 MILLIGRAM(S): 80 TABLET, CHEWABLE ORAL at 23:42

## 2023-11-12 RX ADMIN — QUETIAPINE FUMARATE 12.5 MILLIGRAM(S): 200 TABLET, FILM COATED ORAL at 16:22

## 2023-11-12 RX ADMIN — INSULIN GLARGINE 14 UNIT(S): 100 INJECTION, SOLUTION SUBCUTANEOUS at 05:44

## 2023-11-12 RX ADMIN — SODIUM CHLORIDE 4 MILLILITER(S): 9 INJECTION INTRAMUSCULAR; INTRAVENOUS; SUBCUTANEOUS at 17:30

## 2023-11-12 NOTE — PROGRESS NOTE ADULT - PROBLEM SELECTOR PLAN 4
-S/p CT Abd/Pelvis: No emergent findings or active bleed; 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; 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   -Bowel regimen  - PEG Site appears macerated. Multifactorial, possible the PEG has been too tight at home.    -Continue Simthicone QID -S/p CT Abd/Pelvis: No emergent findings or active bleed; 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; 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   -Bowel regimen  - PEG Site appears macerated. Multifactorial, possible the PEG has been too tight at home.  -Continue Simthicone QID

## 2023-11-12 NOTE — PROGRESS NOTE ADULT - PROBLEM SELECTOR PLAN 1
ID consulted  On Cefipime  MRI confirms acute osteomyelitis of sacrum.  Sputum culture showing Pseudomonas and serratia. Antibiotics to continue.  Tmax 100.4F, remains on Cefepime. maybe reconsult ID?

## 2023-11-12 NOTE — PROGRESS NOTE ADULT - SUBJECTIVE AND OBJECTIVE BOX
Patient is a 73y old  Female who presents with a chief complaint of 74 y/o F with PMHx of T2DM, HTN, HLD, anemia, hypothyroidism, RA, fibromyalgia, remote cerebral aneurysm repair, acute hypercapnic respiratory failure now with tracheostomy, PEG tube, and bedbound (trach change , PEG change ), sacral pressure ulcer, BIBEMS from home for 6 day hx of bleeding from the tracheostomy and PEG tube with associated abdominal pain, recent history of auditory and visual hallucinations, and with chronic sacral decubitus ulcer.    (29 Oct 2023 14:01)      SUBJECTIVE / OVERNIGHT EVENTS: Tmax 100.4F, remains on Cefepime. maybe reconsult ID?     MEDICATIONS  (STANDING):  albuterol/ipratropium for Nebulization 3 milliLiter(s) Nebulizer every 6 hours  artificial  tears Solution 2 Drop(s) Both EYES four times a day  ascorbic acid 500 milliGRAM(s) Oral daily  calcium carbonate    500 mG (Tums) Chewable 1 Tablet(s) Chew every 12 hours  cefepime   IVPB      cefepime   IVPB 2000 milliGRAM(s) IV Intermittent every 8 hours  chlorhexidine 0.12% Liquid 15 milliLiter(s) Oral Mucosa every 12 hours  chlorhexidine 4% Liquid 1 Application(s) Topical <User Schedule>  dextrose 50% Injectable 50 milliLiter(s) IV Push once  enoxaparin Injectable 40 milliGRAM(s) SubCutaneous every 24 hours  escitalopram 10 milliGRAM(s) Oral daily  fentaNYL   Patch  25 MICROgram(s)/Hr 1 Patch Transdermal every 72 hours  gabapentin Solution 100 milliGRAM(s) Enteral Tube at bedtime  insulin glargine Injectable (LANTUS) 14 Unit(s) SubCutaneous every morning  insulin lispro (ADMELOG) corrective regimen sliding scale   SubCutaneous every 6 hours  lactobacillus acidophilus 1 Tablet(s) Oral daily  levothyroxine 125 MICROGram(s) Oral daily  lidocaine   4% Patch 1 Patch Transdermal daily  multivitamin/minerals/iron Oral Solution (CENTRUM) 15 milliLiter(s) Oral daily  nystatin Powder 1 Application(s) Topical every 8 hours  oxybutynin 5 milliGRAM(s) Oral daily  pantoprazole   Suspension 40 milliGRAM(s) Enteral Tube daily  petrolatum Ophthalmic Ointment 1 Application(s) Both EYES four times a day  polyethylene glycol 3350 17 Gram(s) Oral two times a day  predniSONE   Tablet 5 milliGRAM(s) Oral daily  QUEtiapine 12.5 milliGRAM(s) Oral <User Schedule>  simethicone 80 milliGRAM(s) Chew four times a day  sodium chloride 3%  Inhalation 4 milliLiter(s) Inhalation every 12 hours  zinc sulfate 220 milliGRAM(s) Oral daily    MEDICATIONS  (PRN):  acetaminophen   Oral Liquid .. 1000 milliGRAM(s) Enteral Tube every 6 hours PRN Temp greater or equal to 38C (100.4F), Mild Pain (1 - 3)  diphenhydrAMINE Elixir 25 milliGRAM(s) Oral every 6 hours PRN Rash and/or Itching  oxyCODONE    Solution 2.5 milliGRAM(s) Oral every 6 hours PRN Moderate Pain (4 - 6)  sodium chloride 0.65% Nasal 1 Spray(s) Both Nostrils every 6 hours PRN Nasal Congestion      Vital Signs Last 24 Hrs  T(F): 97.6 (23 @ 10:30), Max: 100.4 (23 @ 05:12)  HR: 81 (23 @ 17:29) (76 - 88)  BP: 126/68 (23 @ 10:30) (126/68 - 172/82)  RR: 17 (23 @ 20:39) (17 - 17)  SpO2: 100% (23 @ 17:29) (96% - 100%)  Telemetry:   CAPILLARY BLOOD GLUCOSE      POCT Blood Glucose.: 161 mg/dL (2023 17:11)  POCT Blood Glucose.: 253 mg/dL (2023 12:42)  POCT Blood Glucose.: 131 mg/dL (2023 05:42)  POCT Blood Glucose.: 183 mg/dL (2023 23:59)    I&O's Summary    2023 07:01  -  2023 07:00  --------------------------------------------------------  IN: 1440 mL / OUT: 1150 mL / NET: 290 mL        PHYSICAL EXAM:  GENERAL: NAD, well-developed  HEAD:  Atraumatic, Normocephalic  EYES: EOMI, PERRLA, conjunctiva and sclera clear  NECK: Supple, No JVD  CHEST/LUNG: Clear to auscultation bilaterally; No wheeze  HEART: Regular rate and rhythm; No murmurs, rubs, or gallops  ABDOMEN: Soft, Nontender, Nondistended; Bowel sounds present  EXTREMITIES:  2+ Peripheral Pulses, No clubbing, cyanosis, or edema  PSYCH: AAOx3  NEUROLOGY: non-focal  SKIN: No rashes or lesions    LABS:                        9.2    5.84  )-----------( 131      ( 2023 06:35 )             31.3     -    137  |  102  |  27<H>  ----------------------------<  135<H>  4.1   |  26  |  <0.30<L>    Ca    9.2      2023 06:37  Phos  4.0       Mg     1.8         TPro  7.0  /  Alb  3.1<L>  /  TBili  0.4  /  DBili  x   /  AST  27  /  ALT  27  /  AlkPhos  47  11-          Urinalysis Basic - ( 2023 10:34 )    Color: Yellow / Appearance: Clear / S.018 / pH: x  Gluc: x / Ketone: Negative mg/dL  / Bili: Negative / Urobili: 0.2 mg/dL   Blood: x / Protein: 30 mg/dL / Nitrite: Negative   Leuk Esterase: Negative / RBC: 4 /HPF / WBC 0 /HPF   Sq Epi: x / Non Sq Epi: 1 /HPF / Bacteria: Negative /HPF        RADIOLOGY & ADDITIONAL TESTS:    Imaging Personally Reviewed:    Consultant(s) Notes Reviewed:      Care Discussed with Consultants/Other Providers:

## 2023-11-12 NOTE — PROGRESS NOTE ADULT - PROBLEM SELECTOR PLAN 3
Chronic Trach/ Vent dependant 2/2 Hypercapnic Resp Failure since 10/2022   -S/p Trach # 8 Cuffed Flex Shiley; trach change 8/18, PEG change 8/14 per records   - Continue Home vent settings: (300 TV/ RR 12/5 Peep/ Fio2 30%)  - Tolerates intermittent PSV 15/5 during day/ Vent HS  - Airway Clearance: Duoneb, Hypersal, Chest PT, PRN suctioning   - Maintain 02 sat > 92%    Tracheal Bleeding (Intermittent):   -S/p CT Angio neck: No evidence of active bleeding around tracheostomy site. No hematoma.  No collection   - Advise RN staff to use Red rubber trach catheters to avoid suction trauma   - Seen by ENT; Kath and b/l bronchi visualized without any trauma. small amount of blood tinged secretions resting at beginning of right bronchus not actively bleeding.  - 11/3 trach changed to #9 Portex Chronic Trach/ Vent dependant 2/2 Hypercapnic Resp Failure since 10/2022   -S/p Trach # 8 Cuffed Flex Shiley; trach change 8/18, PEG change 8/14 per records   - Continue Home vent settings: (300 TV/ RR 12/5 Peep/ Fio2 30%)  - Tolerates intermittent PSV 15/5 during day/ Vent HS  - Airway Clearance: Duoneb, Hypersal, Chest PT, PRN suctioning   - Maintain 02 sat > 92%  Tracheal Bleeding (Intermittent)-->resolved   -S/p CT Angio neck: No evidence of active bleeding around tracheostomy site. No hematoma.  No collection   - Advise RN staff to use Red rubber trach catheters to avoid suction trauma   - Seen by ENT; Kath and b/l bronchi visualized without any trauma. small amount of blood tinged secretions resting at beginning of right bronchus not actively bleeding.  - 11/3 trach changed to #9 Portex  11/12 no wean on 11/11, rest on AC today

## 2023-11-12 NOTE — PROGRESS NOTE ADULT - PROBLEM SELECTOR PLAN 2
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   - S/p CT abd/pelvis (10/28): Sacral decubitus ulcer extending to the coccyx with osseous remodeling and pelvic MRI or bone scan to recc to exclude osteomyelitis.  - MRI pelvis 10/30 with Osteomyelitis, c/w current Cefepime for 7 days, Vancomycin d/marli   - Elevated, ESR 85   - Elevated,    - Wound care on board  -11/9:  Following acute infection,  will resume Cipro 500mg q 12 and Keflex 500mg q 6 until 12/10. 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   - S/p CT abd/pelvis (10/28): Sacral decubitus ulcer extending to the coccyx with osseous remodeling and pelvic MRI or bone scan to recc to exclude osteomyelitis.  - MRI pelvis 10/30 with Osteomyelitis, c/w current Cefepime for 7 days, Vancomycin d/marli   - Elevated, ESR 85   - Elevated,    - Wound care on board  -11/9:  Following acute infection,  will resume Cipro 500mg q 12 and Keflex 500mg q 6 until 12/10.  Pt has been on Cefepime since 11/8

## 2023-11-12 NOTE — PROGRESS NOTE ADULT - NS ATTEND AMEND GEN_ALL_CORE FT
73F with a h/o DM, HTN, HLD, anemia, hypothyroidism, RA, fibromyalgia, remote cerebral aneursym with repair, hypercapnic respiratory failure s/p tracheostomy/PEG, bedbound, sacral pressure ulcer. Admitted w/ bleeding from tracheostomy and PEG w/ associated abdominal pain, recent hx of auditory/visual hallucinations. Since admission, no further bleeding noted from either trach or PEG. Imaging showed mild thickening along PEG tube tract and sacral ulcer extending to coccyx suggestive of OM.     MRI with osteomyelitis  Repeat sputum now with serratia, increased secretions back on abx.  Improving delirium but with occasional hallucinations.     # Osteomyelitis  # Chronic hypoxemic RF  # oropharyngeal dysphagia  # acute on chronic pain  # Trach/Peg bleeding  # Tracheitis with PA and serratia  # Hx of hallucination, anxiety    - MRI showing sacral Osteo, on abx per ID, c/w wound care, no plans for surgery. Will need prolonged course of abx  - PS 10/5 during the day, vent at night, and will need vent on discharge  - Repeat sputum culture, with PA and serratia ,PA maybe colonization but serratia appears new. C/W cefepime. Will f/u with ID and sensitivity.   - family requested S/S eval but unable to given dependence on vent  - C/W pain control, multifactorial 2/2 to fibromyalgia as well as now osteo with pressure ulcer. Will decrease dose of pain meds.   - Peg irritation seen by GI, no revs for further interventions. H/H stable  - Seen by  for delirium and hallucination and appreciate follow up - will c/w Seroquel 25 mg QHS  - Urinalysis negative   - DVT ppx- lovenox  - Dispo- full code.   Pending preparation to discharge home on vent. 73F with a h/o DM, HTN, HLD, anemia, hypothyroidism, RA, fibromyalgia, remote cerebral aneursym with repair, hypercapnic respiratory failure s/p tracheostomy/PEG, bedbound, sacral pressure ulcer. Admitted w/ bleeding from tracheostomy and PEG w/ associated abdominal pain, recent hx of auditory/visual hallucinations. Since admission, no further bleeding noted from either trach or PEG. Imaging showed mild thickening along PEG tube tract and sacral ulcer extending to coccyx suggestive of OM.     MRI with osteomyelitis  Repeat sputum now with serratia, increased secretions back on abx.  Improving delirium but with occasional hallucinations.     # Osteomyelitis  # Chronic hypoxemic RF  # oropharyngeal dysphagia  # acute on chronic pain  # Trach/Peg bleeding  # Tracheitis with PA and serratia  # Hx of hallucination, anxiety    - MRI showing sacral Osteo, on abx per ID, c/w wound care, no plans for surgery. Will need prolonged course of abx. Low grade temp overnight to 100.4 and was recultured.   - CXR with LLL atelectasis- change Duonebs to standing and 3% Hypersal, chest PT  - PS 10/5 during the day, vent at night, and will need vent on discharge  - Repeat sputum culture, with PA and serratia ,PA maybe colonization but serratia appears new. C/W cefepime. Will f/u with ID and sensitivity.   - family requested S/S eval but unable to given dependence on vent  - C/W pain control, multifactorial 2/2 to fibromyalgia as well as now osteo with pressure ulcer. Will decrease dose of pain meds.   - Peg irritation seen by GI, no recs for further interventions. H/H stable  - Seen by  for delirium and hallucination and appreciate follow up - will c/w Seroquel 25 mg QHS  - Vaginal itching - no evidence of errthema or discharge, Urinalysis negative   -Corneal erythema - mild, start lacrilube  - DVT ppx- lovenox  - Dispo- full code.   Pending preparation to discharge home on vent. 73F with a h/o DM, HTN, HLD, anemia, hypothyroidism, RA, fibromyalgia, remote cerebral aneursym with repair, hypercapnic respiratory failure s/p tracheostomy/PEG, bedbound, sacral pressure ulcer. Admitted w/ bleeding from tracheostomy and PEG w/ associated abdominal pain, recent hx of auditory/visual hallucinations. Since admission, no further bleeding noted from either trach or PEG. Imaging showed mild thickening along PEG tube tract and sacral ulcer extending to coccyx suggestive of OM.     MRI with osteomyelitis  Repeat sputum now with serratia, increased secretions back on abx.  Improving delirium but with occasional hallucinations.     # Osteomyelitis  # Chronic hypoxemic RF  # oropharyngeal dysphagia  # acute on chronic pain  # Trach/Peg bleeding  # Tracheitis with PA and serratia  # Hx of hallucination, anxiety    - MRI showing sacral Osteo, on abx per ID, c/w wound care, no plans for surgery. Will need prolonged course of abx. Low grade temp overnight to 100.4 and was recultured.   - CXR with LLL atelectasis- change Duonebs to standing and 3% Hypersal, chest PT  - PS 10/5 during the day, vent at night, and will need vent on discharge  - Repeat sputum culture, with PA and serratia ,PA maybe colonization but serratia appears new. C/W cefepime. Will f/u with ID and sensitivity.   - family requested S/S eval but unable to given dependence on vent  - C/W pain control, multifactorial 2/2 to fibromyalgia as well as now osteo with pressure ulcer. Will decrease dose of pain meds.   - Peg irritation seen by GI, no recs for further interventions. H/H stable  - Seen by  for delirium and hallucination and appreciate follow up - will c/w Seroquel QHS- family requesting the dose stay at 12.5 mg  - Vaginal itching - no evidence of errthema or discharge, Urinalysis negative   -Corneal erythema - mild, start lacrilube  - DVT ppx- lovenox  - Dispo- full code.   Pending preparation to discharge home on vent.

## 2023-11-12 NOTE — PROGRESS NOTE ADULT - ASSESSMENT
74 y/o F with PMHx of T2DM, HTN, HLD, anemia, hypothyroidism, RA, fibromyalgia, remote cerebral aneurysm repair, acute hypercapnic respiratory failure now with tracheostomy, PEG tube, and bedbound (trach change 8/18, PEG change 8/14), sacral pressure ulcer, BIBEMS from home for 6 day hx of bleeding from the tracheostomy and PEG tube with associated abdominal pain, recent history of auditory and visual hallucinations, and with chronic sacral decubitus ulcer. Exam notable for mild abdominal distention with LLQ and RLQ tenderness, tracheostomy in place with no obvious bleeding, PEG tube with scant amount of dried blood, at baseline neurologic status. Imaging showing no active bleeding around tracheostomy site, however was notable for mild thickening along PEG tube tract, sacral ulcer extending to the coccyx suggestive of possible osteomyelitis, and bibasilar patchy lung opacities with volume loss. Patient admitted for respiratory support, wound care of tracheostomy and PEG, and management of presumed sacral osteomyelitis.    11/8: No fevers overnight, Spu Cx 11/6: + PSA and Serratia, sensitivities pending; Abx changed to Cefepime while pending speciation. Psych as to follow up with daughter per request. F/u AM Labs.  11/9:  TSH levels elevated, will repeat with T4 tomorrow morning.  Daughter expresses concerns about hallucinations.  Seroquel reduced back to once a daily.  Oxycodone dose reduced to 2.5mg PRN.   Sputum growing serratia and pseudomonas, awaiting further sensitivities.  11/10:  Pseudomonas and Serratia both sensitive to Cefepime.  Patient complained of vaginal itiching, will send UA, GC probe.  11/11: no acute events.     74 y/o F with PMHx of T2DM, HTN, HLD, anemia, hypothyroidism, RA, fibromyalgia, remote cerebral aneurysm repair, acute hypercapnic respiratory failure now with tracheostomy, PEG tube, and bedbound (trach change 8/18, PEG change 8/14), sacral pressure ulcer, BIBEMS from home for 6 day hx of bleeding from the tracheostomy and PEG tube with associated abdominal pain, recent history of auditory and visual hallucinations, and with chronic sacral decubitus ulcer. Exam notable for mild abdominal distention with LLQ and RLQ tenderness, tracheostomy in place with no obvious bleeding, PEG tube with scant amount of dried blood, at baseline neurologic status. Imaging showing no active bleeding around tracheostomy site, however was notable for mild thickening along PEG tube tract, sacral ulcer extending to the coccyx suggestive of possible osteomyelitis, and bibasilar patchy lung opacities with volume loss. Patient admitted for respiratory support, wound care of tracheostomy and PEG, and management of presumed sacral osteomyelitis.    11/8: No fevers overnight, Spu Cx 11/6: + PSA and Serratia, sensitivities pending; Abx changed to Cefepime while pending speciation. Psych as to follow up with daughter per request. F/u AM Labs.  11/9:  TSH levels elevated, will repeat with T4 tomorrow morning.  Daughter expresses concerns about hallucinations.  Seroquel reduced back to once a daily.  Oxycodone dose reduced to 2.5mg PRN.   Sputum growing serratia and pseudomonas, awaiting further sensitivities.  11/10:  Pseudomonas and Serratia both sensitive to Cefepime.  Patient complained of vaginal itiching, will send UA, GC probe.  11/11: no acute events.  11/12 fever 100.4, CXR Bibasilar atelectasis.Small left pleural effusion; added CPT, Duoneb Q6h ATC and HyperSAl BID. repeat CXR in am          74 y/o F with PMHx of T2DM, HTN, HLD, anemia, hypothyroidism, RA, fibromyalgia, remote cerebral aneurysm repair, acute hypercapnic respiratory failure now with tracheostomy, PEG tube, and bedbound (trach change 8/18, PEG change 8/14), sacral pressure ulcer, BIBEMS from home for 6 day hx of bleeding from the tracheostomy and PEG tube with associated abdominal pain, recent history of auditory and visual hallucinations, and with chronic sacral decubitus ulcer. Exam notable for mild abdominal distention with LLQ and RLQ tenderness, tracheostomy in place with no obvious bleeding, PEG tube with scant amount of dried blood, at baseline neurologic status. Imaging showing no active bleeding around tracheostomy site, however was notable for mild thickening along PEG tube tract, sacral ulcer extending to the coccyx suggestive of possible osteomyelitis, and bibasilar patchy lung opacities with volume loss. Patient admitted for respiratory support, wound care of tracheostomy and PEG, and management of presumed sacral osteomyelitis.    11/8: No fevers overnight, Spu Cx 11/6: + PSA and Serratia, sensitivities pending; Abx changed to Cefepime while pending speciation. Psych as to follow up with daughter per request. F/u AM Labs.  11/9:  TSH levels elevated, will repeat with T4 tomorrow morning.  Daughter expresses concerns about hallucinations.  Seroquel reduced back to once a daily.  Oxycodone dose reduced to 2.5mg PRN.   Sputum growing serratia and pseudomonas, awaiting further sensitivities.  11/10:  Pseudomonas and Serratia both sensitive to Cefepime.  Patient complained of vaginal itiching, will send UA, GC probe.  11/11: no acute events.  11/12 fever 100.4, CXR Bibasilar atelectasis. Small left pleural effusion; added CPT, Duoneb Q6h ATC and HyperSAl BID. repeat CXR in am

## 2023-11-13 LAB
ALBUMIN SERPL ELPH-MCNC: 3.1 G/DL — LOW (ref 3.3–5)
ALBUMIN SERPL ELPH-MCNC: 3.1 G/DL — LOW (ref 3.3–5)
ALP SERPL-CCNC: 43 U/L — SIGNIFICANT CHANGE UP (ref 40–120)
ALP SERPL-CCNC: 43 U/L — SIGNIFICANT CHANGE UP (ref 40–120)
ALT FLD-CCNC: 22 U/L — SIGNIFICANT CHANGE UP (ref 10–45)
ALT FLD-CCNC: 22 U/L — SIGNIFICANT CHANGE UP (ref 10–45)
ANION GAP SERPL CALC-SCNC: 12 MMOL/L — SIGNIFICANT CHANGE UP (ref 5–17)
ANION GAP SERPL CALC-SCNC: 12 MMOL/L — SIGNIFICANT CHANGE UP (ref 5–17)
AST SERPL-CCNC: 21 U/L — SIGNIFICANT CHANGE UP (ref 10–40)
AST SERPL-CCNC: 21 U/L — SIGNIFICANT CHANGE UP (ref 10–40)
BASOPHILS # BLD AUTO: 0 K/UL — SIGNIFICANT CHANGE UP (ref 0–0.2)
BASOPHILS # BLD AUTO: 0 K/UL — SIGNIFICANT CHANGE UP (ref 0–0.2)
BASOPHILS NFR BLD AUTO: 0 % — SIGNIFICANT CHANGE UP (ref 0–2)
BASOPHILS NFR BLD AUTO: 0 % — SIGNIFICANT CHANGE UP (ref 0–2)
BILIRUB SERPL-MCNC: 0.3 MG/DL — SIGNIFICANT CHANGE UP (ref 0.2–1.2)
BILIRUB SERPL-MCNC: 0.3 MG/DL — SIGNIFICANT CHANGE UP (ref 0.2–1.2)
BUN SERPL-MCNC: 20 MG/DL — SIGNIFICANT CHANGE UP (ref 7–23)
BUN SERPL-MCNC: 20 MG/DL — SIGNIFICANT CHANGE UP (ref 7–23)
CALCIUM SERPL-MCNC: 9.3 MG/DL — SIGNIFICANT CHANGE UP (ref 8.4–10.5)
CALCIUM SERPL-MCNC: 9.3 MG/DL — SIGNIFICANT CHANGE UP (ref 8.4–10.5)
CHLORIDE SERPL-SCNC: 100 MMOL/L — SIGNIFICANT CHANGE UP (ref 96–108)
CHLORIDE SERPL-SCNC: 100 MMOL/L — SIGNIFICANT CHANGE UP (ref 96–108)
CO2 SERPL-SCNC: 25 MMOL/L — SIGNIFICANT CHANGE UP (ref 22–31)
CO2 SERPL-SCNC: 25 MMOL/L — SIGNIFICANT CHANGE UP (ref 22–31)
CREAT SERPL-MCNC: <0.3 MG/DL — LOW (ref 0.5–1.3)
CREAT SERPL-MCNC: <0.3 MG/DL — LOW (ref 0.5–1.3)
CULTURE RESULTS: SIGNIFICANT CHANGE UP
CULTURE RESULTS: SIGNIFICANT CHANGE UP
EGFR: 113 ML/MIN/1.73M2 — SIGNIFICANT CHANGE UP
EGFR: 113 ML/MIN/1.73M2 — SIGNIFICANT CHANGE UP
EOSINOPHIL # BLD AUTO: 0.05 K/UL — SIGNIFICANT CHANGE UP (ref 0–0.5)
EOSINOPHIL # BLD AUTO: 0.05 K/UL — SIGNIFICANT CHANGE UP (ref 0–0.5)
EOSINOPHIL NFR BLD AUTO: 0.9 % — SIGNIFICANT CHANGE UP (ref 0–6)
EOSINOPHIL NFR BLD AUTO: 0.9 % — SIGNIFICANT CHANGE UP (ref 0–6)
GLUCOSE BLDC GLUCOMTR-MCNC: 160 MG/DL — HIGH (ref 70–99)
GLUCOSE BLDC GLUCOMTR-MCNC: 160 MG/DL — HIGH (ref 70–99)
GLUCOSE BLDC GLUCOMTR-MCNC: 178 MG/DL — HIGH (ref 70–99)
GLUCOSE BLDC GLUCOMTR-MCNC: 178 MG/DL — HIGH (ref 70–99)
GLUCOSE BLDC GLUCOMTR-MCNC: 184 MG/DL — HIGH (ref 70–99)
GLUCOSE BLDC GLUCOMTR-MCNC: 184 MG/DL — HIGH (ref 70–99)
GLUCOSE BLDC GLUCOMTR-MCNC: 270 MG/DL — HIGH (ref 70–99)
GLUCOSE BLDC GLUCOMTR-MCNC: 270 MG/DL — HIGH (ref 70–99)
GLUCOSE BLDC GLUCOMTR-MCNC: 97 MG/DL — SIGNIFICANT CHANGE UP (ref 70–99)
GLUCOSE BLDC GLUCOMTR-MCNC: 97 MG/DL — SIGNIFICANT CHANGE UP (ref 70–99)
GLUCOSE SERPL-MCNC: 81 MG/DL — SIGNIFICANT CHANGE UP (ref 70–99)
GLUCOSE SERPL-MCNC: 81 MG/DL — SIGNIFICANT CHANGE UP (ref 70–99)
HCT VFR BLD CALC: 29 % — LOW (ref 34.5–45)
HCT VFR BLD CALC: 29 % — LOW (ref 34.5–45)
HGB BLD-MCNC: 8.6 G/DL — LOW (ref 11.5–15.5)
HGB BLD-MCNC: 8.6 G/DL — LOW (ref 11.5–15.5)
LYMPHOCYTES # BLD AUTO: 1.85 K/UL — SIGNIFICANT CHANGE UP (ref 1–3.3)
LYMPHOCYTES # BLD AUTO: 1.85 K/UL — SIGNIFICANT CHANGE UP (ref 1–3.3)
LYMPHOCYTES # BLD AUTO: 33.9 % — SIGNIFICANT CHANGE UP (ref 13–44)
LYMPHOCYTES # BLD AUTO: 33.9 % — SIGNIFICANT CHANGE UP (ref 13–44)
MAGNESIUM SERPL-MCNC: 1.9 MG/DL — SIGNIFICANT CHANGE UP (ref 1.6–2.6)
MAGNESIUM SERPL-MCNC: 1.9 MG/DL — SIGNIFICANT CHANGE UP (ref 1.6–2.6)
MANUAL SMEAR VERIFICATION: SIGNIFICANT CHANGE UP
MANUAL SMEAR VERIFICATION: SIGNIFICANT CHANGE UP
MCHC RBC-ENTMCNC: 28.6 PG — SIGNIFICANT CHANGE UP (ref 27–34)
MCHC RBC-ENTMCNC: 28.6 PG — SIGNIFICANT CHANGE UP (ref 27–34)
MCHC RBC-ENTMCNC: 29.7 GM/DL — LOW (ref 32–36)
MCHC RBC-ENTMCNC: 29.7 GM/DL — LOW (ref 32–36)
MCV RBC AUTO: 96.3 FL — SIGNIFICANT CHANGE UP (ref 80–100)
MCV RBC AUTO: 96.3 FL — SIGNIFICANT CHANGE UP (ref 80–100)
MONOCYTES # BLD AUTO: 0.44 K/UL — SIGNIFICANT CHANGE UP (ref 0–0.9)
MONOCYTES # BLD AUTO: 0.44 K/UL — SIGNIFICANT CHANGE UP (ref 0–0.9)
MONOCYTES NFR BLD AUTO: 8 % — SIGNIFICANT CHANGE UP (ref 2–14)
MONOCYTES NFR BLD AUTO: 8 % — SIGNIFICANT CHANGE UP (ref 2–14)
MYELOCYTES NFR BLD: 6.3 % — HIGH (ref 0–0)
MYELOCYTES NFR BLD: 6.3 % — HIGH (ref 0–0)
NEUTROPHILS # BLD AUTO: 2.77 K/UL — SIGNIFICANT CHANGE UP (ref 1.8–7.4)
NEUTROPHILS # BLD AUTO: 2.77 K/UL — SIGNIFICANT CHANGE UP (ref 1.8–7.4)
NEUTROPHILS NFR BLD AUTO: 47.3 % — SIGNIFICANT CHANGE UP (ref 43–77)
NEUTROPHILS NFR BLD AUTO: 47.3 % — SIGNIFICANT CHANGE UP (ref 43–77)
NEUTS BAND # BLD: 3.6 % — SIGNIFICANT CHANGE UP (ref 0–8)
NEUTS BAND # BLD: 3.6 % — SIGNIFICANT CHANGE UP (ref 0–8)
NRBC # BLD: 2 /100 — HIGH (ref 0–0)
NRBC # BLD: 2 /100 — HIGH (ref 0–0)
PHOSPHATE SERPL-MCNC: 3.6 MG/DL — SIGNIFICANT CHANGE UP (ref 2.5–4.5)
PHOSPHATE SERPL-MCNC: 3.6 MG/DL — SIGNIFICANT CHANGE UP (ref 2.5–4.5)
PLAT MORPH BLD: NORMAL — SIGNIFICANT CHANGE UP
PLAT MORPH BLD: NORMAL — SIGNIFICANT CHANGE UP
PLATELET # BLD AUTO: 96 K/UL — LOW (ref 150–400)
PLATELET # BLD AUTO: 96 K/UL — LOW (ref 150–400)
POTASSIUM SERPL-MCNC: 4.1 MMOL/L — SIGNIFICANT CHANGE UP (ref 3.5–5.3)
POTASSIUM SERPL-MCNC: 4.1 MMOL/L — SIGNIFICANT CHANGE UP (ref 3.5–5.3)
POTASSIUM SERPL-SCNC: 4.1 MMOL/L — SIGNIFICANT CHANGE UP (ref 3.5–5.3)
POTASSIUM SERPL-SCNC: 4.1 MMOL/L — SIGNIFICANT CHANGE UP (ref 3.5–5.3)
PROT SERPL-MCNC: 7 G/DL — SIGNIFICANT CHANGE UP (ref 6–8.3)
PROT SERPL-MCNC: 7 G/DL — SIGNIFICANT CHANGE UP (ref 6–8.3)
RBC # BLD: 3.01 M/UL — LOW (ref 3.8–5.2)
RBC # BLD: 3.01 M/UL — LOW (ref 3.8–5.2)
RBC # FLD: 19.2 % — HIGH (ref 10.3–14.5)
RBC # FLD: 19.2 % — HIGH (ref 10.3–14.5)
RBC BLD AUTO: SIGNIFICANT CHANGE UP
RBC BLD AUTO: SIGNIFICANT CHANGE UP
SODIUM SERPL-SCNC: 137 MMOL/L — SIGNIFICANT CHANGE UP (ref 135–145)
SODIUM SERPL-SCNC: 137 MMOL/L — SIGNIFICANT CHANGE UP (ref 135–145)
SPECIMEN SOURCE: SIGNIFICANT CHANGE UP
SPECIMEN SOURCE: SIGNIFICANT CHANGE UP
WBC # BLD: 5.45 K/UL — SIGNIFICANT CHANGE UP (ref 3.8–10.5)
WBC # BLD: 5.45 K/UL — SIGNIFICANT CHANGE UP (ref 3.8–10.5)
WBC # FLD AUTO: 5.45 K/UL — SIGNIFICANT CHANGE UP (ref 3.8–10.5)
WBC # FLD AUTO: 5.45 K/UL — SIGNIFICANT CHANGE UP (ref 3.8–10.5)

## 2023-11-13 PROCEDURE — 71045 X-RAY EXAM CHEST 1 VIEW: CPT | Mod: 26

## 2023-11-13 PROCEDURE — 99233 SBSQ HOSP IP/OBS HIGH 50: CPT

## 2023-11-13 PROCEDURE — 99231 SBSQ HOSP IP/OBS SF/LOW 25: CPT

## 2023-11-13 RX ORDER — CIPROFLOXACIN HCL 0.3 %
1 DROPS OPHTHALMIC (EYE) EVERY 4 HOURS
Refills: 0 | Status: COMPLETED | OUTPATIENT
Start: 2023-11-13 | End: 2023-11-16

## 2023-11-13 RX ADMIN — Medication 3 MILLILITER(S): at 17:43

## 2023-11-13 RX ADMIN — Medication 5 MILLIGRAM(S): at 05:21

## 2023-11-13 RX ADMIN — Medication 1000 MILLIGRAM(S): at 10:30

## 2023-11-13 RX ADMIN — SIMETHICONE 80 MILLIGRAM(S): 80 TABLET, CHEWABLE ORAL at 05:21

## 2023-11-13 RX ADMIN — Medication 5 MILLIGRAM(S): at 11:52

## 2023-11-13 RX ADMIN — SODIUM CHLORIDE 4 MILLILITER(S): 9 INJECTION INTRAMUSCULAR; INTRAVENOUS; SUBCUTANEOUS at 17:42

## 2023-11-13 RX ADMIN — Medication 2: at 23:57

## 2023-11-13 RX ADMIN — CHLORHEXIDINE GLUCONATE 15 MILLILITER(S): 213 SOLUTION TOPICAL at 05:21

## 2023-11-13 RX ADMIN — Medication 1 TABLET(S): at 17:38

## 2023-11-13 RX ADMIN — Medication 1 TABLET(S): at 05:21

## 2023-11-13 RX ADMIN — SIMETHICONE 80 MILLIGRAM(S): 80 TABLET, CHEWABLE ORAL at 23:57

## 2023-11-13 RX ADMIN — QUETIAPINE FUMARATE 12.5 MILLIGRAM(S): 200 TABLET, FILM COATED ORAL at 15:04

## 2023-11-13 RX ADMIN — Medication 2: at 17:39

## 2023-11-13 RX ADMIN — Medication 2 DROP(S): at 05:22

## 2023-11-13 RX ADMIN — Medication 1 DROP(S): at 15:04

## 2023-11-13 RX ADMIN — CEFEPIME 100 MILLIGRAM(S): 1 INJECTION, POWDER, FOR SOLUTION INTRAMUSCULAR; INTRAVENOUS at 21:17

## 2023-11-13 RX ADMIN — LIDOCAINE 1 PATCH: 4 CREAM TOPICAL at 23:51

## 2023-11-13 RX ADMIN — Medication 1000 MILLIGRAM(S): at 10:05

## 2023-11-13 RX ADMIN — ESCITALOPRAM OXALATE 10 MILLIGRAM(S): 10 TABLET, FILM COATED ORAL at 11:52

## 2023-11-13 RX ADMIN — SIMETHICONE 80 MILLIGRAM(S): 80 TABLET, CHEWABLE ORAL at 17:38

## 2023-11-13 RX ADMIN — CHLORHEXIDINE GLUCONATE 15 MILLILITER(S): 213 SOLUTION TOPICAL at 17:38

## 2023-11-13 RX ADMIN — SODIUM CHLORIDE 4 MILLILITER(S): 9 INJECTION INTRAMUSCULAR; INTRAVENOUS; SUBCUTANEOUS at 05:27

## 2023-11-13 RX ADMIN — CHLORHEXIDINE GLUCONATE 1 APPLICATION(S): 213 SOLUTION TOPICAL at 05:22

## 2023-11-13 RX ADMIN — Medication 2 DROP(S): at 17:39

## 2023-11-13 RX ADMIN — Medication 3 MILLILITER(S): at 11:43

## 2023-11-13 RX ADMIN — NYSTATIN CREAM 1 APPLICATION(S): 100000 CREAM TOPICAL at 21:19

## 2023-11-13 RX ADMIN — NYSTATIN CREAM 1 APPLICATION(S): 100000 CREAM TOPICAL at 05:22

## 2023-11-13 RX ADMIN — Medication 1 DROP(S): at 21:19

## 2023-11-13 RX ADMIN — CEFEPIME 100 MILLIGRAM(S): 1 INJECTION, POWDER, FOR SOLUTION INTRAMUSCULAR; INTRAVENOUS at 05:21

## 2023-11-13 RX ADMIN — LIDOCAINE 1 PATCH: 4 CREAM TOPICAL at 03:01

## 2023-11-13 RX ADMIN — Medication 1 APPLICATION(S): at 11:54

## 2023-11-13 RX ADMIN — ENOXAPARIN SODIUM 40 MILLIGRAM(S): 100 INJECTION SUBCUTANEOUS at 05:05

## 2023-11-13 RX ADMIN — CEFEPIME 100 MILLIGRAM(S): 1 INJECTION, POWDER, FOR SOLUTION INTRAMUSCULAR; INTRAVENOUS at 13:28

## 2023-11-13 RX ADMIN — Medication 1 APPLICATION(S): at 23:58

## 2023-11-13 RX ADMIN — Medication 3 MILLILITER(S): at 05:27

## 2023-11-13 RX ADMIN — Medication 500 MILLIGRAM(S): at 11:52

## 2023-11-13 RX ADMIN — Medication 2 DROP(S): at 23:58

## 2023-11-13 RX ADMIN — PANTOPRAZOLE SODIUM 40 MILLIGRAM(S): 20 TABLET, DELAYED RELEASE ORAL at 11:52

## 2023-11-13 RX ADMIN — POLYETHYLENE GLYCOL 3350 17 GRAM(S): 17 POWDER, FOR SOLUTION ORAL at 17:38

## 2023-11-13 RX ADMIN — Medication 1 DROP(S): at 17:40

## 2023-11-13 RX ADMIN — Medication 6: at 12:20

## 2023-11-13 RX ADMIN — Medication 2: at 00:30

## 2023-11-13 RX ADMIN — ZINC SULFATE TAB 220 MG (50 MG ZINC EQUIVALENT) 220 MILLIGRAM(S): 220 (50 ZN) TAB at 11:51

## 2023-11-13 RX ADMIN — GABAPENTIN 100 MILLIGRAM(S): 400 CAPSULE ORAL at 21:17

## 2023-11-13 RX ADMIN — Medication 125 MICROGRAM(S): at 05:06

## 2023-11-13 RX ADMIN — LIDOCAINE 1 PATCH: 4 CREAM TOPICAL at 11:52

## 2023-11-13 RX ADMIN — Medication 1 APPLICATION(S): at 05:21

## 2023-11-13 RX ADMIN — SIMETHICONE 80 MILLIGRAM(S): 80 TABLET, CHEWABLE ORAL at 11:51

## 2023-11-13 RX ADMIN — Medication 2 DROP(S): at 11:53

## 2023-11-13 RX ADMIN — Medication 15 MILLILITER(S): at 11:52

## 2023-11-13 RX ADMIN — INSULIN GLARGINE 14 UNIT(S): 100 INJECTION, SOLUTION SUBCUTANEOUS at 05:40

## 2023-11-13 RX ADMIN — Medication 1 APPLICATION(S): at 17:39

## 2023-11-13 RX ADMIN — Medication 1 TABLET(S): at 11:52

## 2023-11-13 RX ADMIN — NYSTATIN CREAM 1 APPLICATION(S): 100000 CREAM TOPICAL at 13:30

## 2023-11-13 NOTE — PROGRESS NOTE ADULT - PROBLEM SELECTOR PLAN 3
Chronic Trach/ Vent dependant 2/2 Hypercapnic Resp Failure since 10/2022   -S/p Trach # 8 Cuffed Flex Shiley; trach change 8/18, PEG change 8/14 per records   - Continue Home vent settings: (300 TV/ RR 12/5 Peep/ Fio2 30%)  - Tolerates intermittent PSV 15/5 during day/ Vent HS  - Airway Clearance: Duoneb, Hypersal, Chest PT, PRN suctioning   - Maintain 02 sat > 92%  Tracheal Bleeding (Intermittent)-->resolved   -S/p CT Angio neck: No evidence of active bleeding around tracheostomy site. No hematoma.  No collection   - Advise RN staff to use Red rubber trach catheters to avoid suction trauma   - Seen by ENT; Kath and b/l bronchi visualized without any trauma. small amount of blood tinged secretions resting at beginning of right bronchus not actively bleeding.  - 11/3 trach changed to #9 Portex  11/12 no wean on 11/11, rest on AC today

## 2023-11-13 NOTE — PROGRESS NOTE ADULT - ASSESSMENT
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 Bleeding from gastrostomy.   improved    2. Leakage at PEG site.   -no further leaking     3. Abdominal pain. CT negative for acute pathology.  -miralax BID    4. Respiratory   per RCU

## 2023-11-13 NOTE — PROGRESS NOTE ADULT - ASSESSMENT
72 y/o F with PMHx of T2DM, HTN, HLD, anemia, hypothyroidism, RA, fibromyalgia, remote cerebral aneurysm repair, acute hypercapnic respiratory failure now with tracheostomy, PEG tube, and bedbound (trach change 8/18, PEG change 8/14), sacral pressure ulcer, BIBEMS from home for 6 day hx of bleeding from the tracheostomy and PEG tube with associated abdominal pain, recent history of auditory and visual hallucinations, and with chronic sacral decubitus ulcer. Exam notable for mild abdominal distention with LLQ and RLQ tenderness, tracheostomy in place with no obvious bleeding, PEG tube with scant amount of dried blood, at baseline neurologic status. Imaging showing no active bleeding around tracheostomy site, however was notable for mild thickening along PEG tube tract, sacral ulcer extending to the coccyx suggestive of possible osteomyelitis, and bibasilar patchy lung opacities with volume loss. Patient admitted for respiratory support, wound care of tracheostomy and PEG, and management of presumed sacral osteomyelitis.    11/8: No fevers overnight, Spu Cx 11/6: + PSA and Serratia, sensitivities pending; Abx changed to Cefepime while pending speciation. Psych as to follow up with daughter per request. F/u AM Labs.  11/9:  TSH levels elevated, will repeat with T4 tomorrow morning.  Daughter expresses concerns about hallucinations.  Seroquel reduced back to once a daily.  Oxycodone dose reduced to 2.5mg PRN.   Sputum growing serratia and pseudomonas, awaiting further sensitivities.  11/10:  Pseudomonas and Serratia both sensitive to Cefepime.  Patient complained of vaginal itiching, will send UA, GC probe.  11/11: no acute events.  11/12 fever 100.4, CXR Bibasilar atelectasis. Small left pleural effusion; added CPT, Duoneb Q6h ATC and HyperSAl BID. repeat CXR in am          74 y/o F with PMHx of T2DM, HTN, HLD, anemia, hypothyroidism, RA, fibromyalgia, remote cerebral aneurysm repair, acute hypercapnic respiratory failure now with tracheostomy, PEG tube, and bedbound (trach change 8/18, PEG change 8/14), sacral pressure ulcer, BIBEMS from home for 6 day hx of bleeding from the tracheostomy and PEG tube with associated abdominal pain, recent history of auditory and visual hallucinations, and with chronic sacral decubitus ulcer. Exam notable for mild abdominal distention with LLQ and RLQ tenderness, tracheostomy in place with no obvious bleeding, PEG tube with scant amount of dried blood, at baseline neurologic status. Imaging showing no active bleeding around tracheostomy site, however was notable for mild thickening along PEG tube tract, sacral ulcer extending to the coccyx suggestive of possible osteomyelitis, and bibasilar patchy lung opacities with volume loss. Patient admitted for respiratory support, wound care of tracheostomy and PEG, and management of presumed sacral osteomyelitis.    11/8: No fevers overnight, Spu Cx 11/6: + PSA and Serratia, sensitivities pending; Abx changed to Cefepime while pending speciation. Psych as to follow up with daughter per request. F/u AM Labs.  11/9:  TSH levels elevated, will repeat with T4 tomorrow morning.  Daughter expresses concerns about hallucinations.  Seroquel reduced back to once a daily.  Oxycodone dose reduced to 2.5mg PRN.   Sputum growing serratia and pseudomonas, awaiting further sensitivities.  11/10:  Pseudomonas and Serratia both sensitive to Cefepime.  Patient complained of vaginal itiching, will send UA, GC probe.  11/11: no acute events.  11/12 fever 100.4, CXR Bibasilar atelectasis. Small left pleural effusion; added CPT, Duoneb Q6h ATC and HyperSAl BID. repeat CXR in am   11/13: AM CXR noted w/ some improvement on prelim study; Afebrile, No Leukocytosis, Cipro opthal gtt added, Cefepime ends 11/15, then resume oral abx for completion of OM treatment          72 y/o F with PMHx of T2DM, HTN, HLD, anemia, hypothyroidism, RA, fibromyalgia, remote cerebral aneurysm repair, acute hypercapnic respiratory failure now with tracheostomy, PEG tube, and bedbound (trach change 8/18, PEG change 8/14), sacral pressure ulcer, BIBEMS from home for 6 day hx of bleeding from the tracheostomy and PEG tube with associated abdominal pain, recent history of auditory and visual hallucinations, and with chronic sacral decubitus ulcer. Exam notable for mild abdominal distention with LLQ and RLQ tenderness, tracheostomy in place with no obvious bleeding, PEG tube with scant amount of dried blood, at baseline neurologic status. Imaging showing no active bleeding around tracheostomy site, however was notable for mild thickening along PEG tube tract, sacral ulcer extending to the coccyx suggestive of possible osteomyelitis, and bibasilar patchy lung opacities with volume loss. Patient admitted for respiratory support, wound care of tracheostomy and PEG, and management of presumed sacral osteomyelitis.    11/8: No fevers overnight, Spu Cx 11/6: + PSA and Serratia, sensitivities pending; Abx changed to Cefepime while pending speciation. Psych as to follow up with daughter per request. F/u AM Labs.  11/9:  TSH levels elevated, will repeat with T4 tomorrow morning.  Daughter expresses concerns about hallucinations.  Seroquel reduced back to once a daily.  Oxycodone dose reduced to 2.5mg PRN.   Sputum growing serratia and pseudomonas, awaiting further sensitivities.  11/10:  Pseudomonas and Serratia both sensitive to Cefepime.  Patient complained of vaginal itiching, will send UA, GC probe.  11/11: no acute events.  11/12 fever 100.4, CXR Bibasilar atelectasis. Small left pleural effusion; added CPT, Duoneb Q6h ATC and HyperSAl BID. repeat CXR in am   11/13: AM CXR noted w/ some improvement on prelim study; Afebrile, No Leukocytosis, Bcx 11/12 with no growth, Cipro opthal gtt added, Cefepime ends 11/15, then resume oral abx for completion of OM treatment          72 y/o F with PMHx of T2DM, HTN, HLD, anemia, hypothyroidism, RA, fibromyalgia, remote cerebral aneurysm repair, acute hypercapnic respiratory failure now with tracheostomy, PEG tube, and bedbound (trach change 8/18, PEG change 8/14), sacral pressure ulcer, BIBEMS from home for 6 day hx of bleeding from the tracheostomy and PEG tube with associated abdominal pain, recent history of auditory and visual hallucinations, and with chronic sacral decubitus ulcer. Exam notable for mild abdominal distention with LLQ and RLQ tenderness, tracheostomy in place with no obvious bleeding, PEG tube with scant amount of dried blood, at baseline neurologic status. Imaging showing no active bleeding around tracheostomy site, however was notable for mild thickening along PEG tube tract, sacral ulcer extending to the coccyx suggestive of possible osteomyelitis, and bibasilar patchy lung opacities with volume loss. Patient admitted for respiratory support, wound care of tracheostomy and PEG, and management of sacral osteomyelitis. No further Trach and peg site  bleeding noted since admission.  Pelvic MRI imaging also suggestive of Acute OM. Patient placed on long-term antibiotics with Infectious disease guidance.  Additionally treated with a 7d course of Cefepime due to PSA and Serratia tracheitis. Once completed,  plan to abx change back to Cipro 500mg q 12 and Keflex 500mg q 6 to complete a total 6 weeks. Hospital course c/b Delirium for which Seroquel was added and home Lexapro increased.     11/13: AM CXR noted w/ some improvement on prelim study; Afebrile, No Leukocytosis, Bcx 11/12 with no growth, Cipro opthal gtt added, Cefepime ends 11/15, then resume oral abx for completion of OM treatment          72 y/o F with PMHx of T2DM, HTN, HLD, anemia, hypothyroidism, RA, fibromyalgia, remote cerebral aneurysm repair, acute hypercapnic respiratory failure now with tracheostomy, PEG tube, and bedbound (trach change 8/18, PEG change 8/14), sacral pressure ulcer, BIBEMS from home for 6 day hx of bleeding from the tracheostomy and PEG tube with associated abdominal pain, recent history of auditory and visual hallucinations, and with chronic sacral decubitus ulcer. Exam notable for mild abdominal distention with LLQ and RLQ tenderness, tracheostomy in place with no obvious bleeding, PEG tube with scant amount of dried blood, at baseline neurologic status. Imaging showing no active bleeding around tracheostomy site, however was notable for mild thickening along PEG tube tract, sacral ulcer extending to the coccyx suggestive of possible osteomyelitis, and bibasilar patchy lung opacities with volume loss. Patient admitted for respiratory support, wound care of tracheostomy and PEG, and management of sacral osteomyelitis. No further Trach and peg site  bleeding noted since admission.  Pelvic MRI imaging also suggestive of Acute OM. Patient placed on long-term antibiotics with Infectious disease guidance.  Additionally treated with a 7d course of Cefepime due to PSA and Serratia tracheitis. Once completed,  plan to change abx back to Cipro 500mg q 12 and Keflex 500mg q 6 to complete a total of 6 weeks. Hospital course c/b Delirium for which Seroquel was added and home Lexapro continued. Tracheostomy changed to #9 Cuffed [place] by ENT 11/3.     11/13: AM CXR noted w/ some improvement on prelim study; Afebrile, No Leukocytosis, Bcx 11/12 with no growth, Cipro opthal gtt added, Cefepime ends 11/15, then resume oral abx for completion of OM treatment

## 2023-11-13 NOTE — PROGRESS NOTE ADULT - SUBJECTIVE AND OBJECTIVE BOX
Patient is a 73y old  Female who presents with a chief complaint of 72 y/o F with PMHx of T2DM, HTN, HLD, anemia, hypothyroidism, RA, fibromyalgia, remote cerebral aneurysm repair, acute hypercapnic respiratory failure now with tracheostomy, PEG tube, and bedbound (trach change , PEG change ), sacral pressure ulcer, BIBEMS from home for 6 day hx of bleeding from the tracheostomy and PEG tube with associated abdominal pain, recent history of auditory and visual hallucinations, and with chronic sacral decubitus ulcer.    (29 Oct 2023 14:01)      Interval Events:    REVIEW OF SYSTEMS:  [ ] Positive  [ ] All other systems negative  [ ] Unable to assess ROS because ________    Vital Signs Last 24 Hrs  T(C): 37.3 (23 @ 05:27), Max: 37.3 (23 @ 05:27)  T(F): 99.1 (23 @ 05:27), Max: 99.1 (23 @ 05:27)  HR: 80 (23 @ 06:03) (74 - 89)  BP: 146/76 (23 @ 05:27) (124/67 - 153/61)  RR: 16 (23 @ 05:27) (13 - 20)  SpO2: 100% (23 @ 06:03) (99% - 100%)PHYSICAL EXAM:  HEENT:   [ ]Tracheostomy:  [ ]Pupils equal  [ ]No oral lesions  [ ]Abnormal        SKIN  [ ]No Rash  [ ] Abnormal  [ ] pressure    CARDIAC  [ ]Regular  [ ]Abnormal    PULMONARY  [ ]Bilateral Clear Breath Sounds  [ ]Normal Excursion  [ ]Abnormal    GI  [ ]PEG      [ ] +BS		              [ ]Soft, nondistended, nontender	  [ ]Abnormal    MUSCULOSKELETAL                                   [ ]Bedbound                 [ ]Abnormal    [ ]Ambulatory/OOB to chair                           EXTREMITIES                                         [ ]Normal  [ ]Edema                           NEUROLOGIC  [ ] Normal, non focal  [ ] Focal findings:    PSYCHIATRIC  [ ]Alert and appropriate  [ ] Sedated	 [ ]Agitated    :  Mcbride: [ ] Yes, if yes: Date of Placement:                   [  ] No    LINES: Central Lines [ ] Yes, if yes: Date of Placement                                     [  ] No    HOSPITAL MEDICATIONS:  MEDICATIONS  (STANDING):  albuterol/ipratropium for Nebulization 3 milliLiter(s) Nebulizer every 6 hours  artificial  tears Solution 2 Drop(s) Both EYES four times a day  ascorbic acid 500 milliGRAM(s) Oral daily  calcium carbonate    500 mG (Tums) Chewable 1 Tablet(s) Chew every 12 hours  cefepime   IVPB      cefepime   IVPB 2000 milliGRAM(s) IV Intermittent every 8 hours  chlorhexidine 0.12% Liquid 15 milliLiter(s) Oral Mucosa every 12 hours  chlorhexidine 4% Liquid 1 Application(s) Topical <User Schedule>  dextrose 50% Injectable 50 milliLiter(s) IV Push once  enoxaparin Injectable 40 milliGRAM(s) SubCutaneous every 24 hours  escitalopram 10 milliGRAM(s) Oral daily  fentaNYL   Patch  25 MICROgram(s)/Hr 1 Patch Transdermal every 72 hours  gabapentin Solution 100 milliGRAM(s) Enteral Tube at bedtime  insulin glargine Injectable (LANTUS) 14 Unit(s) SubCutaneous every morning  insulin lispro (ADMELOG) corrective regimen sliding scale   SubCutaneous every 6 hours  lactobacillus acidophilus 1 Tablet(s) Oral daily  levothyroxine 125 MICROGram(s) Oral daily  lidocaine   4% Patch 1 Patch Transdermal daily  multivitamin/minerals/iron Oral Solution (CENTRUM) 15 milliLiter(s) Oral daily  nystatin Powder 1 Application(s) Topical every 8 hours  oxybutynin 5 milliGRAM(s) Oral daily  pantoprazole   Suspension 40 milliGRAM(s) Enteral Tube daily  petrolatum Ophthalmic Ointment 1 Application(s) Both EYES four times a day  polyethylene glycol 3350 17 Gram(s) Oral two times a day  predniSONE   Tablet 5 milliGRAM(s) Oral daily  QUEtiapine 12.5 milliGRAM(s) Oral <User Schedule>  simethicone 80 milliGRAM(s) Chew four times a day  sodium chloride 3%  Inhalation 4 milliLiter(s) Inhalation every 12 hours  zinc sulfate 220 milliGRAM(s) Oral daily    MEDICATIONS  (PRN):  acetaminophen   Oral Liquid .. 1000 milliGRAM(s) Enteral Tube every 6 hours PRN Temp greater or equal to 38C (100.4F), Mild Pain (1 - 3)  diphenhydrAMINE Elixir 25 milliGRAM(s) Oral every 6 hours PRN Rash and/or Itching  oxyCODONE    Solution 2.5 milliGRAM(s) Oral every 6 hours PRN Moderate Pain (4 - 6)  sodium chloride 0.65% Nasal 1 Spray(s) Both Nostrils every 6 hours PRN Nasal Congestion      LABS:                        8.6    5.45  )-----------( 96       ( 2023 06:57 )             29.0             Urinalysis Basic - ( 2023 10:34 )    Color: Yellow / Appearance: Clear / S.018 / pH: x  Gluc: x / Ketone: Negative mg/dL  / Bili: Negative / Urobili: 0.2 mg/dL   Blood: x / Protein: 30 mg/dL / Nitrite: Negative   Leuk Esterase: Negative / RBC: 4 /HPF / WBC 0 /HPF   Sq Epi: x / Non Sq Epi: 1 /HPF / Bacteria: Negative /HPF          CAPILLARY BLOOD GLUCOSE    MICROBIOLOGY:     RADIOLOGY:  [ ] Reviewed and interpreted by me    Mode: AC/ CMV (Assist Control/ Continuous Mandatory Ventilation)  RR (machine): 12  TV (machine): 300  FiO2: 30  PEEP: 5  ITime: 0.6  MAP: 8  PIP: 23   Patient is a 73y old  Female who presents with a chief complaint of 74 y/o F with PMHx of T2DM, HTN, HLD, anemia, hypothyroidism, RA, fibromyalgia, remote cerebral aneurysm repair, acute hypercapnic respiratory failure now with tracheostomy, PEG tube, and bedbound (trach change , PEG change ), sacral pressure ulcer, BIBEMS from home for 6 day hx of bleeding from the tracheostomy and PEG tube with associated abdominal pain, recent history of auditory and visual hallucinations, and with chronic sacral decubitus ulcer.    (29 Oct 2023 14:01)      Interval Events: Afebrile o/n, continuing Cefepime for Serratia Tracheitis                          REVIEW OF SYSTEMS:  [x] Positive  [ ] All other systems negative  [ ] Unable to assess ROS because ________    Vital Signs Last 24 Hrs  T(C): 37.3 (23 @ 05:27), Max: 37.3 (23 @ 05:27)  T(F): 99.1 (23 @ 05:27), Max: 99.1 (23 @ 05:27)  HR: 80 (23 @ 06:03) (74 - 89)  BP: 146/76 (23 @ 05:27) (124/67 - 153/61)  RR: 16 (23 @ 05:27) (13 - 20)  SpO2: 100% (23 @ 06:03) (99% - 100%)    PHYSICAL EXAM:    HEENT:   [ X ]Tracheostomy: #9 Portex Cuffed   [ X ]Pupils equal, conjunctival redness  [ ]No oral lesions  [x ]Abnormal-bilateral eye redness, no drainage    SKIN  [ X ] No Rash  [ X ] Abnormal - sacral wound  [ ] pressure    CARDIAC  [ X ]Regular  [ ]Abnormal    PULMONARY  [x ] Bilateral Clear Breath Sounds  [ ]Normal Excursion  [ X ] Abnormal - diminished BS at base L > R     GI  [X] PEG c/d/i      [ X] +BS		              [X] Soft, nondistended, nontender	  [ ]Abnormal    MUSCULOSKELETAL                                   [X] Bedbound                 [ ]Abnormal    [ ]Ambulatory/OOB to chair                           EXTREMITIES                                         [X]Normal  [ ]Edema                           NEUROLOGIC  [X] Normal, non focal  [ ] Focal findings:    PSYCHIATRIC  [X] Alert and appropriate  [ ] Sedated	 [ ]Agitated    :  Mcbride: [ ] Yes, if yes: Date of Placement:                   [X] No    LINES: Central Lines [ ] Yes, if yes: Date of Placement                                     [X] No    HOSPITAL MEDICATIONS:  MEDICATIONS  (STANDING):  albuterol/ipratropium for Nebulization 3 milliLiter(s) Nebulizer every 6 hours  artificial  tears Solution 2 Drop(s) Both EYES four times a day  ascorbic acid 500 milliGRAM(s) Oral daily  calcium carbonate    500 mG (Tums) Chewable 1 Tablet(s) Chew every 12 hours  cefepime   IVPB      cefepime   IVPB 2000 milliGRAM(s) IV Intermittent every 8 hours  chlorhexidine 0.12% Liquid 15 milliLiter(s) Oral Mucosa every 12 hours  chlorhexidine 4% Liquid 1 Application(s) Topical <User Schedule>  dextrose 50% Injectable 50 milliLiter(s) IV Push once  enoxaparin Injectable 40 milliGRAM(s) SubCutaneous every 24 hours  escitalopram 10 milliGRAM(s) Oral daily  fentaNYL   Patch  25 MICROgram(s)/Hr 1 Patch Transdermal every 72 hours  gabapentin Solution 100 milliGRAM(s) Enteral Tube at bedtime  insulin glargine Injectable (LANTUS) 14 Unit(s) SubCutaneous every morning  insulin lispro (ADMELOG) corrective regimen sliding scale   SubCutaneous every 6 hours  lactobacillus acidophilus 1 Tablet(s) Oral daily  levothyroxine 125 MICROGram(s) Oral daily  lidocaine   4% Patch 1 Patch Transdermal daily  multivitamin/minerals/iron Oral Solution (CENTRUM) 15 milliLiter(s) Oral daily  nystatin Powder 1 Application(s) Topical every 8 hours  oxybutynin 5 milliGRAM(s) Oral daily  pantoprazole   Suspension 40 milliGRAM(s) Enteral Tube daily  petrolatum Ophthalmic Ointment 1 Application(s) Both EYES four times a day  polyethylene glycol 3350 17 Gram(s) Oral two times a day  predniSONE   Tablet 5 milliGRAM(s) Oral daily  QUEtiapine 12.5 milliGRAM(s) Oral <User Schedule>  simethicone 80 milliGRAM(s) Chew four times a day  sodium chloride 3%  Inhalation 4 milliLiter(s) Inhalation every 12 hours  zinc sulfate 220 milliGRAM(s) Oral daily    MEDICATIONS  (PRN):  acetaminophen   Oral Liquid .. 1000 milliGRAM(s) Enteral Tube every 6 hours PRN Temp greater or equal to 38C (100.4F), Mild Pain (1 - 3)  diphenhydrAMINE Elixir 25 milliGRAM(s) Oral every 6 hours PRN Rash and/or Itching  oxyCODONE    Solution 2.5 milliGRAM(s) Oral every 6 hours PRN Moderate Pain (4 - 6)  sodium chloride 0.65% Nasal 1 Spray(s) Both Nostrils every 6 hours PRN Nasal Congestion      LABS:                        8.6    5.45  )-----------( 96       ( 2023 06:57 )             29.0             Urinalysis Basic - ( 2023 10:34 )    Color: Yellow / Appearance: Clear / S.018 / pH: x  Gluc: x / Ketone: Negative mg/dL  / Bili: Negative / Urobili: 0.2 mg/dL   Blood: x / Protein: 30 mg/dL / Nitrite: Negative   Leuk Esterase: Negative / RBC: 4 /HPF / WBC 0 /HPF   Sq Epi: x / Non Sq Epi: 1 /HPF / Bacteria: Negative /HPF          CAPILLARY BLOOD GLUCOSE    MICROBIOLOGY:     RADIOLOGY:  [ ] Reviewed and interpreted by me    Mode: AC/ CMV (Assist Control/ Continuous Mandatory Ventilation)  RR (machine): 12  TV (machine): 300  FiO2: 30  PEEP: 5  ITime: 0.6  MAP: 8  PIP: 23   Patient is a 73y old  Female who presents with a chief complaint of 72 y/o F with PMHx of T2DM, HTN, HLD, anemia, hypothyroidism, RA, fibromyalgia, remote cerebral aneurysm repair, acute hypercapnic respiratory failure now with tracheostomy, PEG tube, and bedbound (trach change , PEG change ), sacral pressure ulcer, BIBEMS from home for 6 day hx of bleeding from the tracheostomy and PEG tube with associated abdominal pain, recent history of auditory and visual hallucinations, and with chronic sacral decubitus ulcer.    (29 Oct 2023 14:01)      Interval Events: Afebrile o/n, Awaiting Bcx                        Continuing Cefepime for Serratia Tracheitis                          REVIEW OF SYSTEMS:  [x] Positive  [ ] All other systems negative  [ ] Unable to assess ROS because ________    Vital Signs Last 24 Hrs  T(C): 37.3 (23 @ 05:27), Max: 37.3 (23 @ 05:27)  T(F): 99.1 (23 @ 05:27), Max: 99.1 (23 @ 05:27)  HR: 80 (23 @ 06:03) (74 - 89)  BP: 146/76 (23 @ 05:27) (124/67 - 153/61)  RR: 16 (23 @ 05:27) (13 - 20)  SpO2: 100% (23 @ 06:03) (99% - 100%)    PHYSICAL EXAM:    HEENT:   [ X ]Tracheostomy: #9 Portex Cuffed   [ X ]Pupils equal, conjunctival redness  [ ]No oral lesions  [x ]Abnormal-bilateral eye redness, no drainage    SKIN  [ X ] No Rash  [ X ] Abnormal - sacral wound  [ ] pressure    CARDIAC  [ X ]Regular  [ ]Abnormal    PULMONARY  [x ] Bilateral Clear Breath Sounds  [ ]Normal Excursion  [ X ] Abnormal - diminished BS at base L > R     GI  [X] PEG c/d/i      [ X] +BS		              [X] Soft, nondistended, nontender	  [ ]Abnormal    MUSCULOSKELETAL                                   [X] Bedbound                 [ ]Abnormal    [ ]Ambulatory/OOB to chair                           EXTREMITIES                                         [X]Normal  [ ]Edema                           NEUROLOGIC  [X] Normal, non focal  [ ] Focal findings:    PSYCHIATRIC  [X] Alert and appropriate  [ ] Sedated	 [ ]Agitated    :  Mcbride: [ ] Yes, if yes: Date of Placement:                   [X] No    LINES: Central Lines [ ] Yes, if yes: Date of Placement                                     [X] No    HOSPITAL MEDICATIONS:  MEDICATIONS  (STANDING):  albuterol/ipratropium for Nebulization 3 milliLiter(s) Nebulizer every 6 hours  artificial  tears Solution 2 Drop(s) Both EYES four times a day  ascorbic acid 500 milliGRAM(s) Oral daily  calcium carbonate    500 mG (Tums) Chewable 1 Tablet(s) Chew every 12 hours  cefepime   IVPB      cefepime   IVPB 2000 milliGRAM(s) IV Intermittent every 8 hours  chlorhexidine 0.12% Liquid 15 milliLiter(s) Oral Mucosa every 12 hours  chlorhexidine 4% Liquid 1 Application(s) Topical <User Schedule>  dextrose 50% Injectable 50 milliLiter(s) IV Push once  enoxaparin Injectable 40 milliGRAM(s) SubCutaneous every 24 hours  escitalopram 10 milliGRAM(s) Oral daily  fentaNYL   Patch  25 MICROgram(s)/Hr 1 Patch Transdermal every 72 hours  gabapentin Solution 100 milliGRAM(s) Enteral Tube at bedtime  insulin glargine Injectable (LANTUS) 14 Unit(s) SubCutaneous every morning  insulin lispro (ADMELOG) corrective regimen sliding scale   SubCutaneous every 6 hours  lactobacillus acidophilus 1 Tablet(s) Oral daily  levothyroxine 125 MICROGram(s) Oral daily  lidocaine   4% Patch 1 Patch Transdermal daily  multivitamin/minerals/iron Oral Solution (CENTRUM) 15 milliLiter(s) Oral daily  nystatin Powder 1 Application(s) Topical every 8 hours  oxybutynin 5 milliGRAM(s) Oral daily  pantoprazole   Suspension 40 milliGRAM(s) Enteral Tube daily  petrolatum Ophthalmic Ointment 1 Application(s) Both EYES four times a day  polyethylene glycol 3350 17 Gram(s) Oral two times a day  predniSONE   Tablet 5 milliGRAM(s) Oral daily  QUEtiapine 12.5 milliGRAM(s) Oral <User Schedule>  simethicone 80 milliGRAM(s) Chew four times a day  sodium chloride 3%  Inhalation 4 milliLiter(s) Inhalation every 12 hours  zinc sulfate 220 milliGRAM(s) Oral daily    MEDICATIONS  (PRN):  acetaminophen   Oral Liquid .. 1000 milliGRAM(s) Enteral Tube every 6 hours PRN Temp greater or equal to 38C (100.4F), Mild Pain (1 - 3)  diphenhydrAMINE Elixir 25 milliGRAM(s) Oral every 6 hours PRN Rash and/or Itching  oxyCODONE    Solution 2.5 milliGRAM(s) Oral every 6 hours PRN Moderate Pain (4 - 6)  sodium chloride 0.65% Nasal 1 Spray(s) Both Nostrils every 6 hours PRN Nasal Congestion      LABS:                        8.6    5.45  )-----------( 96       ( 2023 06:57 )             29.0             Urinalysis Basic - ( 2023 10:34 )    Color: Yellow / Appearance: Clear / S.018 / pH: x  Gluc: x / Ketone: Negative mg/dL  / Bili: Negative / Urobili: 0.2 mg/dL   Blood: x / Protein: 30 mg/dL / Nitrite: Negative   Leuk Esterase: Negative / RBC: 4 /HPF / WBC 0 /HPF   Sq Epi: x / Non Sq Epi: 1 /HPF / Bacteria: Negative /HPF          CAPILLARY BLOOD GLUCOSE    MICROBIOLOGY:     RADIOLOGY:  [ ] Reviewed and interpreted by me    Mode: AC/ CMV (Assist Control/ Continuous Mandatory Ventilation)  RR (machine): 12  TV (machine): 300  FiO2: 30  PEEP: 5  ITime: 0.6  MAP: 8  PIP: 23

## 2023-11-13 NOTE — PROGRESS NOTE ADULT - SUBJECTIVE AND OBJECTIVE BOX
Patient is a 73y old  Female who presents with a chief complaint of 74 y/o F with PMHx of T2DM, HTN, HLD, anemia, hypothyroidism, RA, fibromyalgia, remote cerebral aneurysm repair, acute hypercapnic respiratory failure now with tracheostomy, PEG tube, and bedbound (trach change , PEG change ), sacral pressure ulcer, BIBEMS from home for 6 day hx of bleeding from the tracheostomy and PEG tube with associated abdominal pain, recent history of auditory and visual hallucinations, and with chronic sacral decubitus ulcer.   2023      SUBJECTIVE / OVERNIGHT EVENTS: No new symptoms    MEDICATIONS  (STANDING):  albuterol/ipratropium for Nebulization 3 milliLiter(s) Nebulizer every 6 hours  artificial  tears Solution 2 Drop(s) Both EYES four times a day  ascorbic acid 500 milliGRAM(s) Oral daily  calcium carbonate    500 mG (Tums) Chewable 1 Tablet(s) Chew every 12 hours  cefepime   IVPB      cefepime   IVPB 2000 milliGRAM(s) IV Intermittent every 8 hours  chlorhexidine 0.12% Liquid 15 milliLiter(s) Oral Mucosa every 12 hours  chlorhexidine 4% Liquid 1 Application(s) Topical <User Schedule>  dextrose 50% Injectable 50 milliLiter(s) IV Push once  enoxaparin Injectable 40 milliGRAM(s) SubCutaneous every 24 hours  escitalopram 10 milliGRAM(s) Oral daily  fentaNYL   Patch  25 MICROgram(s)/Hr 1 Patch Transdermal every 72 hours  gabapentin Solution 100 milliGRAM(s) Enteral Tube at bedtime  insulin glargine Injectable (LANTUS) 14 Unit(s) SubCutaneous every morning  insulin lispro (ADMELOG) corrective regimen sliding scale   SubCutaneous every 6 hours  lactobacillus acidophilus 1 Tablet(s) Oral daily  levothyroxine 125 MICROGram(s) Oral daily  lidocaine   4% Patch 1 Patch Transdermal daily  multivitamin/minerals/iron Oral Solution (CENTRUM) 15 milliLiter(s) Oral daily  nystatin Powder 1 Application(s) Topical every 8 hours  oxybutynin 5 milliGRAM(s) Oral daily  pantoprazole   Suspension 40 milliGRAM(s) Enteral Tube daily  petrolatum Ophthalmic Ointment 1 Application(s) Both EYES four times a day  polyethylene glycol 3350 17 Gram(s) Oral two times a day  predniSONE   Tablet 5 milliGRAM(s) Oral daily  QUEtiapine 12.5 milliGRAM(s) Oral <User Schedule>  simethicone 80 milliGRAM(s) Chew four times a day  sodium chloride 3%  Inhalation 4 milliLiter(s) Inhalation every 12 hours  zinc sulfate 220 milliGRAM(s) Oral daily    MEDICATIONS  (PRN):  acetaminophen   Oral Liquid .. 1000 milliGRAM(s) Enteral Tube every 6 hours PRN Temp greater or equal to 38C (100.4F), Mild Pain (1 - 3)  diphenhydrAMINE Elixir 25 milliGRAM(s) Oral every 6 hours PRN Rash and/or Itching  oxyCODONE    Solution 2.5 milliGRAM(s) Oral every 6 hours PRN Moderate Pain (4 - 6)  sodium chloride 0.65% Nasal 1 Spray(s) Both Nostrils every 6 hours PRN Nasal Congestion        Telemetry:   CAPILLARY BLOOD GLUCOSE      POCT Blood Glucose.: 161 mg/dL (2023 17:11)  POCT Blood Glucose.: 253 mg/dL (2023 12:42)  POCT Blood Glucose.: 131 mg/dL (2023 05:42)  POCT Blood Glucose.: 183 mg/dL (2023 23:59)    I&O's Summary    2023 07:01  -  2023 07:00  --------------------------------------------------------  IN: 1440 mL / OUT: 1150 mL / NET: 290 mL        PHYSICAL EXAM:  Vital Signs Last 24 Hrs  T(C): 36.2 (2023 11:09), Max: 37.3 (2023 05:27)  T(F): 97.1 (2023 11:09), Max: 99.1 (2023 05:27)  HR: 85 (2023 13:29) (74 - 89)  BP: 132/60 (2023 13:29) (124/67 - 154/55)  BP(mean): --  RR: 18 (2023 09:02) (13 - 20)  SpO2: 100% (2023 13:29) (97% - 100%)    Parameters below as of 2023 13:29  Patient On (Oxygen Delivery Method): ventilator      GENERAL: NAD, well-developed  HEAD:  Atraumatic, Normocephalic  EYES: EOMI, PERRLA, conjunctiva and sclera clear  NECK: Supple, No JVD. Trach in place, connected to mech vent   CHEST/LUNG: Clear to auscultation bilaterally; No wheeze  HEART: Regular rate and rhythm; No murmurs, rubs, or gallops  ABDOMEN: Soft, Nontender, Nondistended; Bowel sounds present  EXTREMITIES:  2+ Peripheral Pulses, No clubbing, cyanosis, or edema  PSYCH: AAOx3  NEUROLOGY: non-focal  SKIN: No rashes or lesions    LABS:                        9.2    5.84  )-----------( 131      ( 2023 06:35 )             31.3         137  |  102  |  27<H>  ----------------------------<  135<H>  4.1   |  26  |  <0.30<L>    Ca    9.2      2023 06:37  Phos  4.0       Mg     1.8         TPro  7.0  /  Alb  3.1<L>  /  TBili  0.4  /  DBili  x   /  AST  27  /  ALT  27  /  AlkPhos  47  11-          Urinalysis Basic - ( 2023 10:34 )    Color: Yellow / Appearance: Clear / S.018 / pH: x  Gluc: x / Ketone: Negative mg/dL  / Bili: Negative / Urobili: 0.2 mg/dL   Blood: x / Protein: 30 mg/dL / Nitrite: Negative   Leuk Esterase: Negative / RBC: 4 /HPF / WBC 0 /HPF   Sq Epi: x / Non Sq Epi: 1 /HPF / Bacteria: Negative /HPF        RADIOLOGY & ADDITIONAL TESTS:    Imaging Personally Reviewed:    Consultant(s) Notes Reviewed:      Care Discussed with Consultants/Other Providers:

## 2023-11-13 NOTE — PROGRESS NOTE ADULT - NS ATTEND AMEND GEN_ALL_CORE FT
73F with a h/o DM, HTN, HLD, anemia, hypothyroidism, RA, fibromyalgia, remote cerebral aneurysm with repair, hypercapnic respiratory failure s/p tracheostomy/PEG, bedbound, sacral pressure ulcer. Admitted w/ bleeding from tracheostomy and PEG w/ associated abdominal pain, recent hx of auditory/visual hallucinations. Since admission, no further bleeding noted from either trach or PEG. Imaging showed mild thickening along PEG tube tract and sacral ulcer extending to coccyx suggestive of OM.     MRI with osteomyelitis  Repeat sputum now with serratia, increased secretions back on abx.  Improving delirium, patient awake and appropriately interacting this morning.     # Osteomyelitis  # Chronic hypoxemic RF  # oropharyngeal dysphagia  # acute on chronic pain  # Trach/Peg bleeding  # Tracheitis with PA and serratia  # Hx of hallucination, anxiety    - MRI showing sacral Osteo, on abx per ID, c/w wound care, no plans for surgery. Will need prolonged course of abx. Afebrile x24 hours, cultures from 11/12 NGTD  - CXR 11/12 with LLL atelectasis, being treated with standing Duonebs and 3% Hypersal, chest PT. CXR today appears improved  - PS 10/5 during the day, vent at night, and will need vent on discharge  - Sputum culture with PA and serratia, c/w Cefepime through 11/15. Will f/u with ID.   - family requested S/S eval but unable to given dependence on vent  - C/W pain control, multifactorial 2/2 to fibromyalgia as well as now osteo with pressure ulcer.    - Peg irritation seen by GI, no recs for further interventions. H/H stable  - Seen by  for delirium and hallucination and appreciate follow up - will c/w Seroquel QHS- family requesting the dose stay at 12.5 mg. Patient awake and interactive today  - Vaginal itching - improved. No evidence of erythema or discharge, Urinalysis negative   - Corneal erythema, no evidence of discharge- will give short course of Cipro eye drops  - DVT ppx- lovenox  - Dispo- full code.   Pending preparation to discharge home on vent.

## 2023-11-13 NOTE — PROGRESS NOTE ADULT - PROBLEM SELECTOR PLAN 1
ID consulted  On Cefipime  MRI confirms acute osteomyelitis of sacrum.  Sputum culture showing Pseudomonas and serratia. Antibiotics to continue.  Tmax 100.4F, remains on Cefepime.   ID to FU

## 2023-11-13 NOTE — PROGRESS NOTE ADULT - PROBLEM SELECTOR PLAN 2
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   - S/p CT abd/pelvis (10/28): Sacral decubitus ulcer extending to the coccyx with osseous remodeling and pelvic MRI or bone scan to recc to exclude osteomyelitis.  - MRI pelvis 10/30 with Osteomyelitis, c/w current Cefepime for 7 days, Vancomycin d/marli   - Elevated, ESR 85   - Elevated,    - Wound care on board  -11/9:  Following acute infection,  will resume Cipro 500mg q 12 and Keflex 500mg q 6 until 12/10.  Pt has been on Cefepime since 11/8

## 2023-11-14 DIAGNOSIS — F06.0 PSYCHOTIC DISORDER WITH HALLUCINATIONS DUE TO KNOWN PHYSIOLOGICAL CONDITION: ICD-10-CM

## 2023-11-14 LAB
ANION GAP SERPL CALC-SCNC: 10 MMOL/L — SIGNIFICANT CHANGE UP (ref 5–17)
ANION GAP SERPL CALC-SCNC: 10 MMOL/L — SIGNIFICANT CHANGE UP (ref 5–17)
BUN SERPL-MCNC: 22 MG/DL — SIGNIFICANT CHANGE UP (ref 7–23)
BUN SERPL-MCNC: 22 MG/DL — SIGNIFICANT CHANGE UP (ref 7–23)
CALCIUM SERPL-MCNC: 9.4 MG/DL — SIGNIFICANT CHANGE UP (ref 8.4–10.5)
CALCIUM SERPL-MCNC: 9.4 MG/DL — SIGNIFICANT CHANGE UP (ref 8.4–10.5)
CHLORIDE SERPL-SCNC: 98 MMOL/L — SIGNIFICANT CHANGE UP (ref 96–108)
CHLORIDE SERPL-SCNC: 98 MMOL/L — SIGNIFICANT CHANGE UP (ref 96–108)
CO2 SERPL-SCNC: 27 MMOL/L — SIGNIFICANT CHANGE UP (ref 22–31)
CO2 SERPL-SCNC: 27 MMOL/L — SIGNIFICANT CHANGE UP (ref 22–31)
CREAT SERPL-MCNC: <0.3 MG/DL — LOW (ref 0.5–1.3)
CREAT SERPL-MCNC: <0.3 MG/DL — LOW (ref 0.5–1.3)
EGFR: 113 ML/MIN/1.73M2 — SIGNIFICANT CHANGE UP
EGFR: 113 ML/MIN/1.73M2 — SIGNIFICANT CHANGE UP
GLUCOSE BLDC GLUCOMTR-MCNC: 127 MG/DL — HIGH (ref 70–99)
GLUCOSE BLDC GLUCOMTR-MCNC: 127 MG/DL — HIGH (ref 70–99)
GLUCOSE BLDC GLUCOMTR-MCNC: 183 MG/DL — HIGH (ref 70–99)
GLUCOSE BLDC GLUCOMTR-MCNC: 183 MG/DL — HIGH (ref 70–99)
GLUCOSE BLDC GLUCOMTR-MCNC: 267 MG/DL — HIGH (ref 70–99)
GLUCOSE BLDC GLUCOMTR-MCNC: 267 MG/DL — HIGH (ref 70–99)
GLUCOSE SERPL-MCNC: 245 MG/DL — HIGH (ref 70–99)
GLUCOSE SERPL-MCNC: 245 MG/DL — HIGH (ref 70–99)
HCT VFR BLD CALC: 31.8 % — LOW (ref 34.5–45)
HCT VFR BLD CALC: 31.8 % — LOW (ref 34.5–45)
HGB BLD-MCNC: 9.4 G/DL — LOW (ref 11.5–15.5)
HGB BLD-MCNC: 9.4 G/DL — LOW (ref 11.5–15.5)
MAGNESIUM SERPL-MCNC: 1.9 MG/DL — SIGNIFICANT CHANGE UP (ref 1.6–2.6)
MAGNESIUM SERPL-MCNC: 1.9 MG/DL — SIGNIFICANT CHANGE UP (ref 1.6–2.6)
MCHC RBC-ENTMCNC: 27.7 PG — SIGNIFICANT CHANGE UP (ref 27–34)
MCHC RBC-ENTMCNC: 27.7 PG — SIGNIFICANT CHANGE UP (ref 27–34)
MCHC RBC-ENTMCNC: 29.6 GM/DL — LOW (ref 32–36)
MCHC RBC-ENTMCNC: 29.6 GM/DL — LOW (ref 32–36)
MCV RBC AUTO: 93.8 FL — SIGNIFICANT CHANGE UP (ref 80–100)
MCV RBC AUTO: 93.8 FL — SIGNIFICANT CHANGE UP (ref 80–100)
NRBC # BLD: 0 /100 WBCS — SIGNIFICANT CHANGE UP (ref 0–0)
NRBC # BLD: 0 /100 WBCS — SIGNIFICANT CHANGE UP (ref 0–0)
PHOSPHATE SERPL-MCNC: 3.5 MG/DL — SIGNIFICANT CHANGE UP (ref 2.5–4.5)
PHOSPHATE SERPL-MCNC: 3.5 MG/DL — SIGNIFICANT CHANGE UP (ref 2.5–4.5)
PLATELET # BLD AUTO: 125 K/UL — LOW (ref 150–400)
PLATELET # BLD AUTO: 125 K/UL — LOW (ref 150–400)
POTASSIUM SERPL-MCNC: 4.2 MMOL/L — SIGNIFICANT CHANGE UP (ref 3.5–5.3)
POTASSIUM SERPL-MCNC: 4.2 MMOL/L — SIGNIFICANT CHANGE UP (ref 3.5–5.3)
POTASSIUM SERPL-SCNC: 4.2 MMOL/L — SIGNIFICANT CHANGE UP (ref 3.5–5.3)
POTASSIUM SERPL-SCNC: 4.2 MMOL/L — SIGNIFICANT CHANGE UP (ref 3.5–5.3)
RBC # BLD: 3.39 M/UL — LOW (ref 3.8–5.2)
RBC # BLD: 3.39 M/UL — LOW (ref 3.8–5.2)
RBC # FLD: 18.7 % — HIGH (ref 10.3–14.5)
RBC # FLD: 18.7 % — HIGH (ref 10.3–14.5)
SODIUM SERPL-SCNC: 135 MMOL/L — SIGNIFICANT CHANGE UP (ref 135–145)
SODIUM SERPL-SCNC: 135 MMOL/L — SIGNIFICANT CHANGE UP (ref 135–145)
WBC # BLD: 6.78 K/UL — SIGNIFICANT CHANGE UP (ref 3.8–10.5)
WBC # BLD: 6.78 K/UL — SIGNIFICANT CHANGE UP (ref 3.8–10.5)
WBC # FLD AUTO: 6.78 K/UL — SIGNIFICANT CHANGE UP (ref 3.8–10.5)
WBC # FLD AUTO: 6.78 K/UL — SIGNIFICANT CHANGE UP (ref 3.8–10.5)

## 2023-11-14 PROCEDURE — 99232 SBSQ HOSP IP/OBS MODERATE 35: CPT

## 2023-11-14 PROCEDURE — 99233 SBSQ HOSP IP/OBS HIGH 50: CPT

## 2023-11-14 PROCEDURE — 99222 1ST HOSP IP/OBS MODERATE 55: CPT | Mod: GC

## 2023-11-14 RX ORDER — BACITRACIN ZINC 500 UNIT/G
1 OINTMENT IN PACKET (EA) TOPICAL
Refills: 0 | Status: DISCONTINUED | OUTPATIENT
Start: 2023-11-14 | End: 2023-11-22

## 2023-11-14 RX ADMIN — Medication 1 DROP(S): at 10:10

## 2023-11-14 RX ADMIN — Medication 1 DROP(S): at 03:00

## 2023-11-14 RX ADMIN — NYSTATIN CREAM 1 APPLICATION(S): 100000 CREAM TOPICAL at 21:51

## 2023-11-14 RX ADMIN — CHLORHEXIDINE GLUCONATE 1 APPLICATION(S): 213 SOLUTION TOPICAL at 05:40

## 2023-11-14 RX ADMIN — Medication 2: at 17:59

## 2023-11-14 RX ADMIN — Medication 1 DROP(S): at 21:43

## 2023-11-14 RX ADMIN — Medication 125 MICROGRAM(S): at 05:39

## 2023-11-14 RX ADMIN — Medication 2 DROP(S): at 05:39

## 2023-11-14 RX ADMIN — SIMETHICONE 80 MILLIGRAM(S): 80 TABLET, CHEWABLE ORAL at 05:39

## 2023-11-14 RX ADMIN — Medication 3 MILLILITER(S): at 05:16

## 2023-11-14 RX ADMIN — CEFEPIME 100 MILLIGRAM(S): 1 INJECTION, POWDER, FOR SOLUTION INTRAMUSCULAR; INTRAVENOUS at 05:38

## 2023-11-14 RX ADMIN — Medication 1 DROP(S): at 05:38

## 2023-11-14 RX ADMIN — Medication 5 MILLIGRAM(S): at 12:02

## 2023-11-14 RX ADMIN — CEFEPIME 100 MILLIGRAM(S): 1 INJECTION, POWDER, FOR SOLUTION INTRAMUSCULAR; INTRAVENOUS at 21:42

## 2023-11-14 RX ADMIN — SIMETHICONE 80 MILLIGRAM(S): 80 TABLET, CHEWABLE ORAL at 18:05

## 2023-11-14 RX ADMIN — PANTOPRAZOLE SODIUM 40 MILLIGRAM(S): 20 TABLET, DELAYED RELEASE ORAL at 12:02

## 2023-11-14 RX ADMIN — Medication 1 DROP(S): at 18:05

## 2023-11-14 RX ADMIN — Medication 5 MILLIGRAM(S): at 05:39

## 2023-11-14 RX ADMIN — SODIUM CHLORIDE 4 MILLILITER(S): 9 INJECTION INTRAMUSCULAR; INTRAVENOUS; SUBCUTANEOUS at 05:16

## 2023-11-14 RX ADMIN — Medication 15 MILLILITER(S): at 12:03

## 2023-11-14 RX ADMIN — Medication 1 TABLET(S): at 12:03

## 2023-11-14 RX ADMIN — QUETIAPINE FUMARATE 12.5 MILLIGRAM(S): 200 TABLET, FILM COATED ORAL at 14:43

## 2023-11-14 RX ADMIN — LIDOCAINE 1 PATCH: 4 CREAM TOPICAL at 19:47

## 2023-11-14 RX ADMIN — Medication 1 APPLICATION(S): at 18:06

## 2023-11-14 RX ADMIN — CEFEPIME 100 MILLIGRAM(S): 1 INJECTION, POWDER, FOR SOLUTION INTRAMUSCULAR; INTRAVENOUS at 14:40

## 2023-11-14 RX ADMIN — Medication 3 MILLILITER(S): at 11:34

## 2023-11-14 RX ADMIN — Medication 1 TABLET(S): at 05:39

## 2023-11-14 RX ADMIN — LIDOCAINE 1 PATCH: 4 CREAM TOPICAL at 12:11

## 2023-11-14 RX ADMIN — SIMETHICONE 80 MILLIGRAM(S): 80 TABLET, CHEWABLE ORAL at 12:02

## 2023-11-14 RX ADMIN — GABAPENTIN 100 MILLIGRAM(S): 400 CAPSULE ORAL at 21:44

## 2023-11-14 RX ADMIN — SODIUM CHLORIDE 4 MILLILITER(S): 9 INJECTION INTRAMUSCULAR; INTRAVENOUS; SUBCUTANEOUS at 17:17

## 2023-11-14 RX ADMIN — ESCITALOPRAM OXALATE 10 MILLIGRAM(S): 10 TABLET, FILM COATED ORAL at 12:03

## 2023-11-14 RX ADMIN — Medication 1 APPLICATION(S): at 18:05

## 2023-11-14 RX ADMIN — INSULIN GLARGINE 14 UNIT(S): 100 INJECTION, SOLUTION SUBCUTANEOUS at 05:41

## 2023-11-14 RX ADMIN — Medication 6: at 12:14

## 2023-11-14 RX ADMIN — Medication 3 MILLILITER(S): at 17:17

## 2023-11-14 RX ADMIN — ENOXAPARIN SODIUM 40 MILLIGRAM(S): 100 INJECTION SUBCUTANEOUS at 04:19

## 2023-11-14 RX ADMIN — CHLORHEXIDINE GLUCONATE 15 MILLILITER(S): 213 SOLUTION TOPICAL at 05:40

## 2023-11-14 RX ADMIN — Medication 2 DROP(S): at 12:02

## 2023-11-14 RX ADMIN — CHLORHEXIDINE GLUCONATE 15 MILLILITER(S): 213 SOLUTION TOPICAL at 18:04

## 2023-11-14 RX ADMIN — Medication 2 DROP(S): at 19:19

## 2023-11-14 RX ADMIN — Medication 1 DROP(S): at 14:39

## 2023-11-14 RX ADMIN — NYSTATIN CREAM 1 APPLICATION(S): 100000 CREAM TOPICAL at 05:40

## 2023-11-14 RX ADMIN — ZINC SULFATE TAB 220 MG (50 MG ZINC EQUIVALENT) 220 MILLIGRAM(S): 220 (50 ZN) TAB at 12:03

## 2023-11-14 RX ADMIN — Medication 1 APPLICATION(S): at 05:39

## 2023-11-14 RX ADMIN — Medication 1 APPLICATION(S): at 12:03

## 2023-11-14 RX ADMIN — Medication 1 TABLET(S): at 18:05

## 2023-11-14 RX ADMIN — NYSTATIN CREAM 1 APPLICATION(S): 100000 CREAM TOPICAL at 14:39

## 2023-11-14 RX ADMIN — Medication 500 MILLIGRAM(S): at 12:03

## 2023-11-14 NOTE — CHART NOTE - NSCHARTNOTEFT_GEN_A_CORE
Nutrition Follow Up Note  Patient seen for: follow up and verbal request to speak with family from patient and family centered care     Source: [] Patient       [x] Medical Record        [x] Nursing        [x] Family at bedside - spouse      [x] Other: PA    -If unable to interview patient: [x] Trach/Vent/BiPAP  [] Disoriented/confused/inappropriate to interview    Diet Order:   Diet, NPO with Tube Feed:   Tube Feeding Modality: Gastrostomy  Casie Farns Peptide 1.5  Total Volume for 24 Hours (mL): 900  Continuous  Starting Tube Feed Rate {mL per Hour}: 50  Increase Tube Feed Rate by (mL): 0  Until Goal Tube Feed Rate (mL per Hour): 50  Tube Feed Duration (in Hours): 18  Tube Feed Start Time: 06:00  Tube Feed Stop Time: 03:00  Free Water Flush  Pump   Rate (mL per Hour): 10   Frequency: Every 2 Hours  Alfred(7 Gm Arginine/7 Gm Glut/1.2 Gm HMB     Qty per Day:  2 (11-10-23)    EN order providing if 100% provision: 900ml total volume, 1384kcal (~27kcal/kg) and 66g protein (1.27g/kg).     - Is current order appropriate/adequate? see below for recommendations    Nutrition-related concerns:  -free water flush ordered, see above.   -pt vegan, but per previous discussion with daughter, Alfred is okay. See nutrition note .   -Hgb A1c 7.5%. POCT Blood glucose levels being monitored. on insulin regimen to maintain glycemic control.   -PO synthroid ordered.   -lactobacillus acidophilus ordered   -Spoke with pt spouse at bedside. no questions/concerns at this time. made aware that RD remains available.  -Pt receives [place] peptide 1.5 at home.    GI: lose stool noted  per flow sheets. Last BM  per flow sheets.  Bowel Regimen? [x] Yes   [] No    Weights:   10/29:  reports pt UBW 90 pounds prior to illness (~41kg).   Daily Weight in k.9 (), Daily Weight in k.9 ()  RD will continue to monitor trends.   BMI using most recent weight and electronic medical record height: 23.9    Nutritionally Pertinent MEDICATIONS  (STANDING):  ascorbic acid  calcium carbonate    500 mG (Tums) Chewable  cefepime   IVPB  cefepime   IVPB  dextrose 50% Injectable  insulin glargine Injectable (LANTUS)  insulin lispro (ADMELOG) corrective regimen sliding scale  levothyroxine  multivitamin/minerals/iron Oral Solution (CENTRUM)  pantoprazole   Suspension  polyethylene glycol 3350  predniSONE   Tablet  simethicone  zinc sulfate    Pertinent Labs:   A1C with Estimated Average Glucose Result: 7.5 % (10-28-23 @ 07:18)    Finger Sticks:  POCT Blood Glucose.: 127 mg/dL ( @ 05:40)  POCT Blood Glucose.: 178 mg/dL ( @ 23:55)  POCT Blood Glucose.: 160 mg/dL ( @ 17:08)  POCT Blood Glucose.: 270 mg/dL ( @ 12:13)    Skin per nursing documentation: sacrum stage IV per flow sheets.   Edema per nursing documentation:  1+ generalized per flow sheets     Estimated Needs using most recent weight   27-32kcal/kg 1400-1660kcal/day  1.2-1.4g/kg 62-73g protein/day  defer fluid needs to team    Previous Nutrition Diagnosis:  Increased Nutrient Needs  Nutrition Diagnosis is: [x] ongoing  [] resolved [] not applicable     Nutrition Care Plan:  [x] In Progress  [] Achieved  [] Not applicable    New Nutrition Diagnosis: [x] Not applicable    Nutrition Interventions:     Education Provided   [] Yes:  [x] No:     Recommendations:      -Can continue current EN regimen as above. Including Alfred BID, providing pt with 1564kcal (90 calories in each Alfred, 30kcal/kg), 66g protein (1.27g/kg).   -Alfred will provide additional glutamine and arginine amino acids with collagen for wound healing. continue.   -Continue Vitamin C and Multivitamin to aid in wound healing.   -Can continue 18 hour feeds to allow time off pump for synthroid administration.   -If lose stools persist, can consider d/c polyethylene glycol.  -Consider d/c zinc as it has been ordered >7 days. zinc x 7 days has been proven to aid in wound healing.   -Can continue lactobacillus acidophilus per team to aid in gut health.   -Defer free water flush to team.     Monitoring and Evaluation:   Continue to monitor nutritional intake, tolerance to diet prescription, weights, labs, skin integrity    RD remains available upon request and will follow up per protocol  Rufina Moltzen, MS, RD, CDN / Teams

## 2023-11-14 NOTE — PROGRESS NOTE ADULT - ASSESSMENT
74 y/o F with PMHx of T2DM, HTN, HLD, anemia, hypothyroidism, RA, fibromyalgia, remote cerebral aneurysm repair, acute hypercapnic respiratory failure now with tracheostomy, PEG tube, and bedbound (trach change 8/18, PEG change 8/14), sacral pressure ulcer, BIBEMS from home for 6 day hx of bleeding from the tracheostomy and PEG tube with associated abdominal pain, recent history of auditory and visual hallucinations, and with chronic sacral decubitus ulcer. Exam notable for mild abdominal distention with LLQ and RLQ tenderness, tracheostomy in place with no obvious bleeding, PEG tube with scant amount of dried blood, at baseline neurologic status. Imaging showing no active bleeding around tracheostomy site, however was notable for mild thickening along PEG tube tract, sacral ulcer extending to the coccyx suggestive of possible osteomyelitis, and bibasilar patchy lung opacities with volume loss. Patient admitted for respiratory support, wound care of tracheostomy and PEG, and management of sacral osteomyelitis. No further Trach and peg site  bleeding noted since admission.  Pelvic MRI imaging also suggestive of Acute OM. Patient placed on long-term antibiotics with Infectious disease guidance.  Additionally treated with a 7d course of Cefepime due to PSA and Serratia tracheitis. Once completed,  plan to change abx back to Cipro 500mg q 12 and Keflex 500mg q 6 to complete a total of 6 weeks. Hospital course c/b Delirium for which Seroquel was added and home Lexapro continued. Tracheostomy changed to #9 Cuffed Portex by ENT 11/3.     11/13: AM CXR noted w/ some improvement on prelim study; Afebrile, No Leukocytosis, Bcx 11/12 with no growth, Cipro opthal gtt added, Cefepime ends 11/15, then resume oral abx for completion of OM treatment          72 y/o F with PMHx of T2DM, HTN, HLD, anemia, hypothyroidism, RA, fibromyalgia, remote cerebral aneurysm repair, acute hypercapnic respiratory failure now with tracheostomy, PEG tube, and bedbound (trach change 8/18, PEG change 8/14), sacral pressure ulcer, BIBEMS from home for 6 day hx of bleeding from the tracheostomy and PEG tube with associated abdominal pain, recent history of auditory and visual hallucinations, and with chronic sacral decubitus ulcer. Exam notable for mild abdominal distention with LLQ and RLQ tenderness, tracheostomy in place with no obvious bleeding, PEG tube with scant amount of dried blood, at baseline neurologic status. Imaging showing no active bleeding around tracheostomy site, however was notable for mild thickening along PEG tube tract, sacral ulcer extending to the coccyx suggestive of possible osteomyelitis, and bibasilar patchy lung opacities with volume loss. Patient admitted for respiratory support, wound care of tracheostomy and PEG, and management of sacral osteomyelitis. No further Trach and peg site  bleeding noted since admission.  Pelvic MRI imaging also suggestive of Acute OM. Patient placed on long-term antibiotics with Infectious disease guidance.  Additionally treated with a 7d course of Cefepime due to PSA and Serratia tracheitis. Once completed,  plan to change abx back to Cipro 500mg q 12 and Keflex 500mg q 6 to complete a total of 6 weeks. Hospital course c/b Delirium for which Seroquel was added and home Lexapro continued. Tracheostomy changed to #9 Cuffed Portex by ENT 11/3.     11/14: Day 6/7 of Cefepime, had multiple loose stool overnight, Miralax stopped for now, Thrombocytopenia improved today. Will resume oral abx for completion of OM treatment with Cipro 500mg q 12 and Keflex 500mg q 6 until 12/10.

## 2023-11-14 NOTE — PROGRESS NOTE ADULT - NS ATTEND AMEND GEN_ALL_CORE FT
73F with a h/o DM, HTN, HLD, anemia, hypothyroidism, RA, fibromyalgia, remote cerebral aneurysm with repair, hypercapnic respiratory failure s/p tracheostomy/PEG, bedbound, sacral pressure ulcer. Admitted w/ bleeding from tracheostomy and PEG w/ associated abdominal pain, recent hx of auditory/visual hallucinations. Since admission, no further bleeding noted from either trach or PEG. Imaging showed mild thickening along PEG tube tract and sacral ulcer extending to coccyx suggestive of OM.     MRI with osteomyelitis  Repeat sputum now with serratia, increased secretions back on abx.  Improving delirium, patient awake and appropriately interacting this morning.     # Osteomyelitis  # Chronic hypoxemic RF  # oropharyngeal dysphagia  # acute on chronic pain  # Trach/Peg bleeding  # Tracheitis with PA and serratia  # Hx of hallucination, anxiety    - MRI showing sacral Osteo, on abx per ID, c/w wound care, no plans for surgery. Will need prolonged course of abx. Afebrile, cultures from 11/12 NGTD  - CXR 11/12 with LLL atelectasis, being treated with standing Duonebs and 3% Hypersal, chest PT. CXR 11/13 appears improved  - PS 10/5 during the day, vent at night, and will need vent on discharge  - Sputum culture with PA and serratia, c/w Cefepime through 11/15. Will f/u with ID.   - family requested S/S eval but unable to given dependence on vent  - C/W pain control, multifactorial 2/2 to fibromyalgia as well as now osteo with pressure ulcer.    - Peg irritation seen by GI, no recs for further interventions. H/H stable  - Thrombocytopenia - improved today  - Seen by  for delirium and hallucination and appreciate follow up - will c/w Seroquel QHS- family requesting the dose stay at 12.5 mg. Patient awake and interactive today  - Vaginal itching - improved. No evidence of erythema or discharge, Urinalysis negative   - Corneal erythema, no evidence of discharge- day 2/3 course of Cipro eye drops, c/w artificial tears  -right back raised/dark lesion (appears chronic) - will consult Derm  -Daughter requesting Podiatry eval for diabetes foot care  - DVT ppx- lovenox  - Dispo- full code.   above was discussed with the  at bedside, daughter over the phone

## 2023-11-14 NOTE — CONSULT NOTE ADULT - ASSESSMENT
___________________________  ASSESSMENT AND PLAN  #Irritated Seborrheic keratosis, R mid back     Recommendations:   - Discussed with family this is a benign diagnosis but can become irritated/ painful / pruritic   - Discussed when irritated, in the outpatient setting we treat with cryotherapy, but unable to perform this inpatient. May also consider excision, but would be overly aggressive and would require surgical consultation.    - START triamcinolone 0.1% ointment BID for 2 weeks on / 1 week off as needed for itch  - May keep lesion covered with telfa to avoid manipulation for comfort     #Dermatofibroma, R upper back   - Benign, no treatment warranted     The patient's chart was reviewed in addition to being seen and examined at bedside with the dermatology attending Dr. Gongora.    Please page [phone] with a 10-digit call-back number for further related questions.    Deepti Vazquez MD  Resident Physician, PGY-3  [facility]  Pager: [phone]  Office: [phone]

## 2023-11-14 NOTE — PROGRESS NOTE ADULT - PROBLEM SELECTOR PLAN 2
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   - S/p CT abd/pelvis (10/28): Sacral decubitus ulcer extending to the coccyx with osseous remodeling and pelvic MRI or bone scan to recc to exclude osteomyelitis.  - MRI pelvis 10/30 with Osteomyelitis, c/w current Cefepime for 7 days, Vancomycin d/marli   - Elevated, ESR 85   - Elevated,    - Wound care on board  -11/9:  Following acute infection,  will resume Cipro 500mg q 12 and Keflex 500mg q 6 until 12/10.  Pt has been on Cefepime since 11/8 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   - S/p CT abd/pelvis (10/28): Sacral decubitus ulcer extending to the coccyx with osseous remodeling and pelvic MRI or bone scan to recc to exclude osteomyelitis.  - MRI pelvis 10/30 with Osteomyelitis, c/w current Cefepime for 7 days, Vancomycin d/marli   - Elevated, ESR 85   - Elevated,    - Wound care on board  -11/9:  Following acute infection,  will resume Cipro 500mg q 12 and Keflex 500mg q 6 until 12/10.

## 2023-11-14 NOTE — PROGRESS NOTE ADULT - SUBJECTIVE AND OBJECTIVE BOX
Patient is a 73y old  Female who presents with a chief complaint of 72 y/o F with PMHx of T2DM, HTN, HLD, anemia, hypothyroidism, RA, fibromyalgia, remote cerebral aneurysm repair, acute hypercapnic respiratory failure now with tracheostomy, PEG tube, and bedbound (trach change , PEG change ), sacral pressure ulcer, BIBEMS from home for 6 day hx of bleeding from the tracheostomy and PEG tube with associated abdominal pain, recent history of auditory and visual hallucinations, and with chronic sacral decubitus ulcer.   2023      SUBJECTIVE / OVERNIGHT EVENTS: No new symptoms    MEDICATIONS  (STANDING):  albuterol/ipratropium for Nebulization 3 milliLiter(s) Nebulizer every 6 hours  artificial  tears Solution 2 Drop(s) Both EYES four times a day  ascorbic acid 500 milliGRAM(s) Oral daily  calcium carbonate    500 mG (Tums) Chewable 1 Tablet(s) Chew every 12 hours  cefepime   IVPB      cefepime   IVPB 2000 milliGRAM(s) IV Intermittent every 8 hours  chlorhexidine 0.12% Liquid 15 milliLiter(s) Oral Mucosa every 12 hours  chlorhexidine 4% Liquid 1 Application(s) Topical <User Schedule>  dextrose 50% Injectable 50 milliLiter(s) IV Push once  enoxaparin Injectable 40 milliGRAM(s) SubCutaneous every 24 hours  escitalopram 10 milliGRAM(s) Oral daily  fentaNYL   Patch  25 MICROgram(s)/Hr 1 Patch Transdermal every 72 hours  gabapentin Solution 100 milliGRAM(s) Enteral Tube at bedtime  insulin glargine Injectable (LANTUS) 14 Unit(s) SubCutaneous every morning  insulin lispro (ADMELOG) corrective regimen sliding scale   SubCutaneous every 6 hours  lactobacillus acidophilus 1 Tablet(s) Oral daily  levothyroxine 125 MICROGram(s) Oral daily  lidocaine   4% Patch 1 Patch Transdermal daily  multivitamin/minerals/iron Oral Solution (CENTRUM) 15 milliLiter(s) Oral daily  nystatin Powder 1 Application(s) Topical every 8 hours  oxybutynin 5 milliGRAM(s) Oral daily  pantoprazole   Suspension 40 milliGRAM(s) Enteral Tube daily  petrolatum Ophthalmic Ointment 1 Application(s) Both EYES four times a day  polyethylene glycol 3350 17 Gram(s) Oral two times a day  predniSONE   Tablet 5 milliGRAM(s) Oral daily  QUEtiapine 12.5 milliGRAM(s) Oral <User Schedule>  simethicone 80 milliGRAM(s) Chew four times a day  sodium chloride 3%  Inhalation 4 milliLiter(s) Inhalation every 12 hours  zinc sulfate 220 milliGRAM(s) Oral daily    MEDICATIONS  (PRN):  acetaminophen   Oral Liquid .. 1000 milliGRAM(s) Enteral Tube every 6 hours PRN Temp greater or equal to 38C (100.4F), Mild Pain (1 - 3)  diphenhydrAMINE Elixir 25 milliGRAM(s) Oral every 6 hours PRN Rash and/or Itching  oxyCODONE    Solution 2.5 milliGRAM(s) Oral every 6 hours PRN Moderate Pain (4 - 6)  sodium chloride 0.65% Nasal 1 Spray(s) Both Nostrils every 6 hours PRN Nasal Congestion        Telemetry:   CAPILLARY BLOOD GLUCOSE      POCT Blood Glucose.: 161 mg/dL (2023 17:11)  POCT Blood Glucose.: 253 mg/dL (2023 12:42)  POCT Blood Glucose.: 131 mg/dL (2023 05:42)  POCT Blood Glucose.: 183 mg/dL (2023 23:59)    I&O's Summary    2023 07:01  -  2023 07:00  --------------------------------------------------------  IN: 1440 mL / OUT: 1150 mL / NET: 290 mL        PHYSICAL EXAM:    Vital Signs Last 24 Hrs  T(C): 37.8 (2023 12:13), Max: 37.8 (2023 12:13)  T(F): 100 (2023 12:13), Max: 100 (2023 12:13)  HR: 89 (2023 15:35) (75 - 100)  BP: 172/88 (2023 12:13) (157/78 - 172/88)  BP(mean): --  RR: 18 (2023 12:13) (16 - 19)  SpO2: 100% (2023 15:35) (95% - 100%)    Parameters below as of 2023 12:13  Patient On (Oxygen Delivery Method): ventilator        GENERAL: NAD, well-developed  HEAD:  Atraumatic, Normocephalic  EYES: EOMI, PERRLA, conjunctiva and sclera clear  NECK: Supple, No JVD. Trach in place, connected to mech vent   CHEST/LUNG: Clear to auscultation bilaterally; No wheeze  HEART: Regular rate and rhythm; No murmurs, rubs, or gallops  ABDOMEN: Soft, Nontender, Nondistended; Bowel sounds present  EXTREMITIES:  2+ Peripheral Pulses, No clubbing, cyanosis, or edema  PSYCH: AAOx3  NEUROLOGY: non-focal  SKIN: No rashes or lesions    LABS:                        9.2    5.84  )-----------( 131      ( 2023 06:35 )             31.3         137  |  102  |  27<H>  ----------------------------<  135<H>  4.1   |  26  |  <0.30<L>    Ca    9.2      2023 06:37  Phos  4.0     -  Mg     1.8         TPro  7.0  /  Alb  3.1<L>  /  TBili  0.4  /  DBili  x   /  AST  27  /  ALT  27  /  AlkPhos  47  11-          Urinalysis Basic - ( 2023 10:34 )    Color: Yellow / Appearance: Clear / S.018 / pH: x  Gluc: x / Ketone: Negative mg/dL  / Bili: Negative / Urobili: 0.2 mg/dL   Blood: x / Protein: 30 mg/dL / Nitrite: Negative   Leuk Esterase: Negative / RBC: 4 /HPF / WBC 0 /HPF   Sq Epi: x / Non Sq Epi: 1 /HPF / Bacteria: Negative /HPF        RADIOLOGY & ADDITIONAL TESTS:    Imaging Personally Reviewed:    Consultant(s) Notes Reviewed:      Care Discussed with Consultants/Other Providers:

## 2023-11-14 NOTE — PROGRESS NOTE ADULT - SUBJECTIVE AND OBJECTIVE BOX
Patient is a 73y Female     Patient is a 73y old  Female who presents with a chief complaint of 72 y/o F with PMHx of T2DM, HTN, HLD, anemia, hypothyroidism, RA, fibromyalgia, remote cerebral aneurysm repair, acute hypercapnic respiratory failure now with tracheostomy, PEG tube, and bedbound (trach change 8/18, PEG change 8/14), sacral pressure ulcer, BIBEMS from home for 6 day hx of bleeding from the tracheostomy and PEG tube with associated abdominal pain, recent history of auditory and visual hallucinations, and with chronic sacral decubitus ulcer.    (29 Oct 2023 14:01)      HPI:  72 y/o F with PMHx of T2DM, HTN, HLD, anemia, hypothyroidism, RA, fibromyalgia, remote cerebral aneurysm repair, acute hypercapnic respiratory failure now with tracheostomy, PEG tube, and bedbound (trach change 8/18, PEG change 8/14), sacral pressure ulcer, BIBEMS from home for 6 day hx of bleeding from the tracheostomy and PEG tube with associated abdominal pain. Patient still having regular bowel movements, last one occurred prior to ED arrival. Was seen outpatient on 10/26 by critical care Dr. Zaki Gottlieb and apparently had cultures sent at that time. Patient also noted to have chronic sacral decubitous ulcer. Family endorsed one episode of fever, though was not febrile on evaluation. Daughter noted patient was recently experiencing auditory and visual hallucinations (seeing animals that were not there & hearing a "bomb" going off outside her home), though patient not actively hallucinating on evaluation.    ED course notable for CT neck imaging showing no evidence of active bleeding around the tracheostomy site, no hematomas, and no drainable collection around the tracheostomy site. CT abdomen/pelvis notable for gastrostomy tube localized to the stomach with mild thickening noted along the tract; a sacral decubitus ulcer extending to the coccyx with osseous remodeling, possibly representing osteomyelitis; and bibasilar patchy opacities with volume loss in the lungs, representing atelectasis vs infection. Patient admitted for respiratory support, wound care of tracheostomy and PEG, and management of presumed sacral osteomyelitis.   (28 Oct 2023 04:18)      PAST MEDICAL & SURGICAL HISTORY:  Diabetes      Rheumatoid arthritis      Fibromyalgia      Hypothyroid      Hypertension      H/O tracheostomy      PEG (percutaneous endoscopic gastrostomy) status          MEDICATIONS  (STANDING):  albuterol/ipratropium for Nebulization 3 milliLiter(s) Nebulizer every 6 hours  artificial  tears Solution 2 Drop(s) Both EYES four times a day  ascorbic acid 500 milliGRAM(s) Oral daily  calcium carbonate    500 mG (Tums) Chewable 1 Tablet(s) Chew every 12 hours  cefepime   IVPB      cefepime   IVPB 2000 milliGRAM(s) IV Intermittent every 8 hours  chlorhexidine 0.12% Liquid 15 milliLiter(s) Oral Mucosa every 12 hours  chlorhexidine 4% Liquid 1 Application(s) Topical <User Schedule>  ciprofloxacin  0.3% Ophthalmic Solution 1 Drop(s) Both EYES every 4 hours  dextrose 50% Injectable 50 milliLiter(s) IV Push once  enoxaparin Injectable 40 milliGRAM(s) SubCutaneous every 24 hours  escitalopram 10 milliGRAM(s) Oral daily  gabapentin Solution 100 milliGRAM(s) Enteral Tube at bedtime  insulin glargine Injectable (LANTUS) 14 Unit(s) SubCutaneous every morning  insulin lispro (ADMELOG) corrective regimen sliding scale   SubCutaneous every 6 hours  lactobacillus acidophilus 1 Tablet(s) Oral daily  levothyroxine 125 MICROGram(s) Oral daily  lidocaine   4% Patch 1 Patch Transdermal daily  multivitamin/minerals/iron Oral Solution (CENTRUM) 15 milliLiter(s) Oral daily  nystatin Powder 1 Application(s) Topical every 8 hours  oxybutynin 5 milliGRAM(s) Oral daily  pantoprazole   Suspension 40 milliGRAM(s) Enteral Tube daily  petrolatum Ophthalmic Ointment 1 Application(s) Both EYES four times a day  polyethylene glycol 3350 17 Gram(s) Oral two times a day  predniSONE   Tablet 5 milliGRAM(s) Oral daily  QUEtiapine 12.5 milliGRAM(s) Oral <User Schedule>  simethicone 80 milliGRAM(s) Chew four times a day  sodium chloride 3%  Inhalation 4 milliLiter(s) Inhalation every 12 hours  zinc sulfate 220 milliGRAM(s) Oral daily      Allergies    penicillin (Unknown)  heparin (Unknown)  Tagamet (Unknown)  pineapple (Unknown)  walnut (Unknown)  Pecan, Filbert, Hazelnut (Unknown)  Lyrica (Unknown)    Intolerances        SOCIAL HISTORY:  Denies ETOh,Smoking,     FAMILY HISTORY:      REVIEW OF SYSTEMS:    CONSTITUTIONAL: No weakness, fevers or chills  EYES/ENT: No visual changes;  No vertigo or throat pain   NECK: No pain or stiffness  RESPIRATORY: No cough, wheezing, hemoptysis; No shortness of breath  CARDIOVASCULAR: No chest pain or palpitations  GASTROINTESTINAL: No abdominal or epigastric pain. No nausea, vomiting, or hematemesis; No diarrhea or constipation. No melena or hematochezia.  GENITOURINARY: No dysuria, frequency or hematuria  NEUROLOGICAL: No numbness or weakness  SKIN: No itching, burning, rashes, or lesions   All other review of systems is negative unless indicated above.    VITAL:  T(C): , Max: 37.1 (11-14-23 @ 05:30)  T(F): , Max: 98.8 (11-14-23 @ 05:30)  HR: 97 (11-14-23 @ 05:30)  BP: 157/78 (11-14-23 @ 05:30)  BP(mean): --  RR: 16 (11-14-23 @ 05:30)  SpO2: 100% (11-14-23 @ 05:30)  Wt(kg): --    I and O's:    11-11 @ 07:01  -  11-12 @ 07:00  --------------------------------------------------------  IN: 1440 mL / OUT: 1150 mL / NET: 290 mL    11-12 @ 07:01  -  11-13 @ 07:00  --------------------------------------------------------  IN: 1370 mL / OUT: 750 mL / NET: 620 mL    11-13 @ 07:01  -  11-14 @ 06:52  --------------------------------------------------------  IN: 1300 mL / OUT: 1650 mL / NET: -350 mL          PHYSICAL EXAM:    Constitutional: NAD  HEENT: PERRLA,   Neck: No JVD  Respiratory: CTA B/L  Cardiovascular: S1 and S2  Gastrointestinal: BS+, soft, NT/ND  Extremities: No peripheral edema  Neurological: A/O x 3, no focal deficits  Psychiatric: Normal mood, normal affect  : No Mcbride  Skin: No rashes  Access: Not applicable  Back: No CVA tenderness    LABS:                        8.6    5.45  )-----------( 96       ( 13 Nov 2023 06:57 )             29.0     11-13    137  |  100  |  20  ----------------------------<  81  4.1   |  25  |  <0.30<L>    Ca    9.3      13 Nov 2023 07:47  Phos  3.6     11-13  Mg     1.9     11-13    TPro  7.0  /  Alb  3.1<L>  /  TBili  0.3  /  DBili  x   /  AST  21  /  ALT  22  /  AlkPhos  43  11-13          RADIOLOGY & ADDITIONAL STUDIES:

## 2023-11-14 NOTE — PROGRESS NOTE ADULT - SUBJECTIVE AND OBJECTIVE BOX
Patient is a 73y old  Female who presents with a chief complaint of leaking peg (14 Nov 2023 06:52)      Interval Events:    REVIEW OF SYSTEMS:  [ ] Positive  [ ] All other systems negative  [ ] Unable to assess ROS because ________    Vital Signs Last 24 Hrs  T(C): 37.1 (11-14-23 @ 05:30), Max: 37.1 (11-14-23 @ 05:30)  T(F): 98.8 (11-14-23 @ 05:30), Max: 98.8 (11-14-23 @ 05:30)  HR: 97 (11-14-23 @ 05:30) (75 - 97)  BP: 157/78 (11-14-23 @ 05:30) (132/60 - 158/74)  RR: 16 (11-14-23 @ 05:30) (16 - 19)  SpO2: 100% (11-14-23 @ 05:30) (97% - 100%)PHYSICAL EXAM:  HEENT:   [ ]Tracheostomy:  [ ]Pupils equal  [ ]No oral lesions  [ ]Abnormal        SKIN  [ ]No Rash  [ ] Abnormal  [ ] pressure    CARDIAC  [ ]Regular  [ ]Abnormal    PULMONARY  [ ]Bilateral Clear Breath Sounds  [ ]Normal Excursion  [ ]Abnormal    GI  [ ]PEG      [ ] +BS		              [ ]Soft, nondistended, nontender	  [ ]Abnormal    MUSCULOSKELETAL                                   [ ]Bedbound                 [ ]Abnormal    [ ]Ambulatory/OOB to chair                           EXTREMITIES                                         [ ]Normal  [ ]Edema                           NEUROLOGIC  [ ] Normal, non focal  [ ] Focal findings:    PSYCHIATRIC  [ ]Alert and appropriate  [ ] Sedated	 [ ]Agitated    :  Mcbride: [ ] Yes, if yes: Date of Placement:                   [  ] No    LINES: Central Lines [ ] Yes, if yes: Date of Placement                                     [  ] No    HOSPITAL MEDICATIONS:  MEDICATIONS  (STANDING):  albuterol/ipratropium for Nebulization 3 milliLiter(s) Nebulizer every 6 hours  artificial  tears Solution 2 Drop(s) Both EYES four times a day  ascorbic acid 500 milliGRAM(s) Oral daily  calcium carbonate    500 mG (Tums) Chewable 1 Tablet(s) Chew every 12 hours  cefepime   IVPB 2000 milliGRAM(s) IV Intermittent every 8 hours  cefepime   IVPB      chlorhexidine 0.12% Liquid 15 milliLiter(s) Oral Mucosa every 12 hours  chlorhexidine 4% Liquid 1 Application(s) Topical <User Schedule>  ciprofloxacin  0.3% Ophthalmic Solution 1 Drop(s) Both EYES every 4 hours  dextrose 50% Injectable 50 milliLiter(s) IV Push once  enoxaparin Injectable 40 milliGRAM(s) SubCutaneous every 24 hours  escitalopram 10 milliGRAM(s) Oral daily  gabapentin Solution 100 milliGRAM(s) Enteral Tube at bedtime  insulin glargine Injectable (LANTUS) 14 Unit(s) SubCutaneous every morning  insulin lispro (ADMELOG) corrective regimen sliding scale   SubCutaneous every 6 hours  lactobacillus acidophilus 1 Tablet(s) Oral daily  levothyroxine 125 MICROGram(s) Oral daily  lidocaine   4% Patch 1 Patch Transdermal daily  multivitamin/minerals/iron Oral Solution (CENTRUM) 15 milliLiter(s) Oral daily  nystatin Powder 1 Application(s) Topical every 8 hours  oxybutynin 5 milliGRAM(s) Oral daily  pantoprazole   Suspension 40 milliGRAM(s) Enteral Tube daily  petrolatum Ophthalmic Ointment 1 Application(s) Both EYES four times a day  polyethylene glycol 3350 17 Gram(s) Oral two times a day  predniSONE   Tablet 5 milliGRAM(s) Oral daily  QUEtiapine 12.5 milliGRAM(s) Oral <User Schedule>  simethicone 80 milliGRAM(s) Chew four times a day  sodium chloride 3%  Inhalation 4 milliLiter(s) Inhalation every 12 hours  zinc sulfate 220 milliGRAM(s) Oral daily    MEDICATIONS  (PRN):  acetaminophen   Oral Liquid .. 1000 milliGRAM(s) Enteral Tube every 6 hours PRN Temp greater or equal to 38C (100.4F), Mild Pain (1 - 3)  diphenhydrAMINE Elixir 25 milliGRAM(s) Oral every 6 hours PRN Rash and/or Itching  oxyCODONE    Solution 2.5 milliGRAM(s) Oral every 6 hours PRN Moderate Pain (4 - 6)  sodium chloride 0.65% Nasal 1 Spray(s) Both Nostrils every 6 hours PRN Nasal Congestion      LABS:                        8.6    5.45  )-----------( 96       ( 13 Nov 2023 06:57 )             29.0     11-13    137  |  100  |  20  ----------------------------<  81  4.1   |  25  |  <0.30<L>    Ca    9.3      13 Nov 2023 07:47  Phos  3.6     11-13  Mg     1.9     11-13    TPro  7.0  /  Alb  3.1<L>  /  TBili  0.3  /  DBili  x   /  AST  21  /  ALT  22  /  AlkPhos  43  11-13      Urinalysis Basic - ( 13 Nov 2023 07:47 )    Color: x / Appearance: x / SG: x / pH: x  Gluc: 81 mg/dL / Ketone: x  / Bili: x / Urobili: x   Blood: x / Protein: x / Nitrite: x   Leuk Esterase: x / RBC: x / WBC x   Sq Epi: x / Non Sq Epi: x / Bacteria: x          CAPILLARY BLOOD GLUCOSE    MICROBIOLOGY:     RADIOLOGY:  [ ] Reviewed and interpreted by me    Mode: AC/ CMV (Assist Control/ Continuous Mandatory Ventilation)  RR (machine): 12  TV (machine): 300  FiO2: 30  PEEP: 5  ITime: 0.6  MAP: 7  PIP: 23   Patient is a 73y old  Female who presents with a chief complaint of leaking peg (14 Nov 2023 06:52)      Interval Events: Day 6/7 Cefepime                        Monitoring Thrombocytopenia                         Stable overnight on vent w/ no acute events     REVIEW OF SYSTEMS:  [x ] Positive- Daughter reporting multiple loose stools   [ ] All other systems negative  [ ] Unable to assess ROS because ________    Vital Signs Last 24 Hrs  T(C): 37.1 (11-14-23 @ 05:30), Max: 37.1 (11-14-23 @ 05:30)  T(F): 98.8 (11-14-23 @ 05:30), Max: 98.8 (11-14-23 @ 05:30)  HR: 97 (11-14-23 @ 05:30) (75 - 97)  BP: 157/78 (11-14-23 @ 05:30) (132/60 - 158/74)  RR: 16 (11-14-23 @ 05:30) (16 - 19)  SpO2: 100% (11-14-23 @ 05:30) (97% - 100%)    PHYSICAL EXAM:    HEENT:   [ X ]Tracheostomy: #9 Portex Cuffed   [ X ]Pupils equal, conjunctival redness  [ ]No oral lesions  [x ]Abnormal-bilateral eye redness, no drainage    SKIN  [ X ] No Rash  [ X ] Abnormal - sacral wound  [ ] pressure    CARDIAC  [ X ]Regular  [ ]Abnormal    PULMONARY  [x ] Bilateral Clear Breath Sounds  [ ]Normal Excursion  [ X ] Abnormal - diminished BS at base L > R     GI  [X] PEG c/d/i      [ X] +BS		              [X] Soft, nondistended, nontender	  [ ]Abnormal    MUSCULOSKELETAL                                   [X] Bedbound                 [ ]Abnormal    [ ]Ambulatory/OOB to chair                           EXTREMITIES                                         [X]Normal  [ ]Edema                           NEUROLOGIC  [X] Normal, non focal  [ ] Focal findings:    PSYCHIATRIC  [X] Alert and appropriate  [ ] Sedated	 [ ]Agitated    :  Mcbride: [ ] Yes, if yes: Date of Placement:                   [X] No    LINES: Central Lines [ ] Yes, if yes: Date of Placement                                     [X] No      HOSPITAL MEDICATIONS:  MEDICATIONS  (STANDING):  albuterol/ipratropium for Nebulization 3 milliLiter(s) Nebulizer every 6 hours  artificial  tears Solution 2 Drop(s) Both EYES four times a day  ascorbic acid 500 milliGRAM(s) Oral daily  calcium carbonate    500 mG (Tums) Chewable 1 Tablet(s) Chew every 12 hours  cefepime   IVPB 2000 milliGRAM(s) IV Intermittent every 8 hours  cefepime   IVPB      chlorhexidine 0.12% Liquid 15 milliLiter(s) Oral Mucosa every 12 hours  chlorhexidine 4% Liquid 1 Application(s) Topical <User Schedule>  ciprofloxacin  0.3% Ophthalmic Solution 1 Drop(s) Both EYES every 4 hours  dextrose 50% Injectable 50 milliLiter(s) IV Push once  enoxaparin Injectable 40 milliGRAM(s) SubCutaneous every 24 hours  escitalopram 10 milliGRAM(s) Oral daily  gabapentin Solution 100 milliGRAM(s) Enteral Tube at bedtime  insulin glargine Injectable (LANTUS) 14 Unit(s) SubCutaneous every morning  insulin lispro (ADMELOG) corrective regimen sliding scale   SubCutaneous every 6 hours  lactobacillus acidophilus 1 Tablet(s) Oral daily  levothyroxine 125 MICROGram(s) Oral daily  lidocaine   4% Patch 1 Patch Transdermal daily  multivitamin/minerals/iron Oral Solution (CENTRUM) 15 milliLiter(s) Oral daily  nystatin Powder 1 Application(s) Topical every 8 hours  oxybutynin 5 milliGRAM(s) Oral daily  pantoprazole   Suspension 40 milliGRAM(s) Enteral Tube daily  petrolatum Ophthalmic Ointment 1 Application(s) Both EYES four times a day  polyethylene glycol 3350 17 Gram(s) Oral two times a day  predniSONE   Tablet 5 milliGRAM(s) Oral daily  QUEtiapine 12.5 milliGRAM(s) Oral <User Schedule>  simethicone 80 milliGRAM(s) Chew four times a day  sodium chloride 3%  Inhalation 4 milliLiter(s) Inhalation every 12 hours  zinc sulfate 220 milliGRAM(s) Oral daily    MEDICATIONS  (PRN):  acetaminophen   Oral Liquid .. 1000 milliGRAM(s) Enteral Tube every 6 hours PRN Temp greater or equal to 38C (100.4F), Mild Pain (1 - 3)  diphenhydrAMINE Elixir 25 milliGRAM(s) Oral every 6 hours PRN Rash and/or Itching  oxyCODONE    Solution 2.5 milliGRAM(s) Oral every 6 hours PRN Moderate Pain (4 - 6)  sodium chloride 0.65% Nasal 1 Spray(s) Both Nostrils every 6 hours PRN Nasal Congestion      LABS:                        8.6    5.45  )-----------( 96       ( 13 Nov 2023 06:57 )             29.0     11-13    137  |  100  |  20  ----------------------------<  81  4.1   |  25  |  <0.30<L>    Ca    9.3      13 Nov 2023 07:47  Phos  3.6     11-13  Mg     1.9     11-13    TPro  7.0  /  Alb  3.1<L>  /  TBili  0.3  /  DBili  x   /  AST  21  /  ALT  22  /  AlkPhos  43  11-13      Urinalysis Basic - ( 13 Nov 2023 07:47 )    Color: x / Appearance: x / SG: x / pH: x  Gluc: 81 mg/dL / Ketone: x  / Bili: x / Urobili: x   Blood: x / Protein: x / Nitrite: x   Leuk Esterase: x / RBC: x / WBC x   Sq Epi: x / Non Sq Epi: x / Bacteria: x          CAPILLARY BLOOD GLUCOSE    MICROBIOLOGY:     RADIOLOGY:  [ ] Reviewed and interpreted by me    Mode: AC/ CMV (Assist Control/ Continuous Mandatory Ventilation)  RR (machine): 12  TV (machine): 300  FiO2: 30  PEEP: 5  ITime: 0.6  MAP: 7  PIP: 23

## 2023-11-14 NOTE — CONSULT NOTE ADULT - SUBJECTIVE AND OBJECTIVE BOX
HPI:  74 y/o F with PMHx of T2DM, HTN, HLD, anemia, hypothyroidism, RA, fibromyalgia, remote cerebral aneurysm repair, acute hypercapnic respiratory failure now with tracheostomy, PEG tube, and bedbound (trach change 8/18, PEG change 8/14), sacral pressure ulcer, BIBEMS from home for 6 day hx of bleeding from the tracheostomy and PEG tube with associated abdominal pain. Patient still having regular bowel movements, last one occurred prior to ED arrival. Was seen outpatient on 10/26 by critical care Dr. Zaki Gottlieb and apparently had cultures sent at that time. Patient also noted to have chronic sacral decubitous ulcer. Family endorsed one episode of fever, though was not febrile on evaluation. Daughter noted patient was recently experiencing auditory and visual hallucinations (seeing animals that were not there & hearing a "bomb" going off outside her home), though patient not actively hallucinating on evaluation.    ED course notable for CT neck imaging showing no evidence of active bleeding around the tracheostomy site, no hematomas, and no drainable collection around the tracheostomy site. CT abdomen/pelvis notable for gastrostomy tube localized to the stomach with mild thickening noted along the tract; a sacral decubitus ulcer extending to the coccyx with osseous remodeling, possibly representing osteomyelitis; and bibasilar patchy opacities with volume loss in the lungs, representing atelectasis vs infection. Patient admitted for respiratory support, wound care of tracheostomy and PEG, and management of presumed sacral osteomyelitis.   (28 Oct 2023 04:18)    Dermatology consulted for growth on R mid back      PAST MEDICAL & SURGICAL HISTORY:  Diabetes      Rheumatoid arthritis      Fibromyalgia      Hypothyroid      Hypertension      H/O tracheostomy      PEG (percutaneous endoscopic gastrostomy) status          Review of Systems:  Negative aside from the above     MEDICATIONS  (STANDING):  albuterol/ipratropium for Nebulization 3 milliLiter(s) Nebulizer every 6 hours  artificial  tears Solution 2 Drop(s) Both EYES four times a day  ascorbic acid 500 milliGRAM(s) Oral daily  bacitracin   Ointment 1 Application(s) Topical two times a day  calcium carbonate    500 mG (Tums) Chewable 1 Tablet(s) Chew every 12 hours  cefepime   IVPB      cefepime   IVPB 2000 milliGRAM(s) IV Intermittent every 8 hours  chlorhexidine 0.12% Liquid 15 milliLiter(s) Oral Mucosa every 12 hours  chlorhexidine 4% Liquid 1 Application(s) Topical <User Schedule>  ciprofloxacin  0.3% Ophthalmic Solution 1 Drop(s) Both EYES every 4 hours  dextrose 50% Injectable 50 milliLiter(s) IV Push once  enoxaparin Injectable 40 milliGRAM(s) SubCutaneous every 24 hours  escitalopram 10 milliGRAM(s) Oral daily  gabapentin Solution 100 milliGRAM(s) Enteral Tube at bedtime  insulin glargine Injectable (LANTUS) 14 Unit(s) SubCutaneous every morning  insulin lispro (ADMELOG) corrective regimen sliding scale   SubCutaneous every 6 hours  lactobacillus acidophilus 1 Tablet(s) Oral daily  levothyroxine 125 MICROGram(s) Oral daily  lidocaine   4% Patch 1 Patch Transdermal daily  multivitamin/minerals/iron Oral Solution (CENTRUM) 15 milliLiter(s) Oral daily  nystatin Powder 1 Application(s) Topical every 8 hours  oxybutynin 5 milliGRAM(s) Oral daily  pantoprazole   Suspension 40 milliGRAM(s) Enteral Tube daily  petrolatum Ophthalmic Ointment 1 Application(s) Both EYES four times a day  predniSONE   Tablet 5 milliGRAM(s) Oral daily  QUEtiapine 12.5 milliGRAM(s) Oral <User Schedule>  simethicone 80 milliGRAM(s) Chew four times a day  sodium chloride 3%  Inhalation 4 milliLiter(s) Inhalation every 12 hours  zinc sulfate 220 milliGRAM(s) Oral daily    ALLERGIES: penicillin  heparin  Tagamet  pineapple  walnut  Pecan, Filbert, Hazelnut  Lyrica        SOCIAL HISTORY:  ____________________________________  Social History:  lives at home with family  dependent for all ADL  no toxic habits  FULL CODE  FAMILY HISTORY:        VITAL SIGNS LAST 24 HOURS:  T(F): 99.9 (11-14 @ 19:52), Max: 100 (11-14 @ 12:13)  HR: 91 (11-14 @ 20:23) (75 - 100)  BP: 100/49 (11-14 @ 19:52) (100/49 - 172/88)  RR: 18 (11-14 @ 19:52) (16 - 18)    PHYSICAL EXAM:     The patient was alert and in no apparent distress.  There was no visible lymphadenopathy.  Conjunctiva were non injected  The scalp, hair, face, eyebrows, lips, neck, chest, back,   extremities X 4, nails were examined.  There was no hyperhidrosis or bromhidrosis.    Of note on skin exam:   - stuck on verrucous black plaque on R mid back   - hyperpigmented papule on R upper back   ____________________________________    LABS:                        9.4    6.78  )-----------( 125      ( 14 Nov 2023 12:14 )             31.8     11-14      135  |  98  |  22  ----------------------------<  245<H>  4.2   |  27  |  <0.30<L>    Ca    9.4      14 Nov 2023 12:14  Phos  3.5     11-14  Mg     1.9     11-14    TPro  7.0  /  Alb  3.1<L>  /  TBili  0.3  /  DBili  x   /  AST  21  /  ALT  22  /  AlkPhos  43  11-13      Urinalysis Basic - ( 14 Nov 2023 12:14 )    Color: x / Appearance: x / SG: x / pH: x  Gluc: 245 mg/dL / Ketone: x  / Bili: x / Urobili: x   Blood: x / Protein: x / Nitrite: x   Leuk Esterase: x / RBC: x / WBC x   Sq Epi: x / Non Sq Epi: x / Bacteria: x     HPI:  72 y/o F with PMHx of T2DM, HTN, HLD, anemia, hypothyroidism, RA, fibromyalgia, remote cerebral aneurysm repair, acute hypercapnic respiratory failure now with tracheostomy, PEG tube, and bedbound (trach change 8/18, PEG change 8/14), sacral pressure ulcer, BIBEMS from home for 6 day hx of bleeding from the tracheostomy and PEG tube with associated abdominal pain. Patient still having regular bowel movements, last one occurred prior to ED arrival. Was seen outpatient on 10/26 by critical care Dr. Zaki Gottlieb and apparently had cultures sent at that time. Patient also noted to have chronic sacral decubitous ulcer. Family endorsed one episode of fever, though was not febrile on evaluation. Daughter noted patient was recently experiencing auditory and visual hallucinations (seeing animals that were not there & hearing a "bomb" going off outside her home), though patient not actively hallucinating on evaluation.    ED course notable for CT neck imaging showing no evidence of active bleeding around the tracheostomy site, no hematomas, and no drainable collection around the tracheostomy site. CT abdomen/pelvis notable for gastrostomy tube localized to the stomach with mild thickening noted along the tract; a sacral decubitus ulcer extending to the coccyx with osseous remodeling, possibly representing osteomyelitis; and bibasilar patchy opacities with volume loss in the lungs, representing atelectasis vs infection. Patient admitted for respiratory support, wound care of tracheostomy and PEG, and management of presumed sacral osteomyelitis.   (28 Oct 2023 04:18)    Dermatology consulted for growth on R mid back      PAST MEDICAL & SURGICAL HISTORY:  Diabetes      Rheumatoid arthritis      Fibromyalgia      Hypothyroid      Hypertension      H/O tracheostomy      PEG (percutaneous endoscopic gastrostomy) status          Review of Systems:  Negative aside from the above     MEDICATIONS  (STANDING):  albuterol/ipratropium for Nebulization 3 milliLiter(s) Nebulizer every 6 hours  artificial  tears Solution 2 Drop(s) Both EYES four times a day  ascorbic acid 500 milliGRAM(s) Oral daily  bacitracin   Ointment 1 Application(s) Topical two times a day  calcium carbonate    500 mG (Tums) Chewable 1 Tablet(s) Chew every 12 hours  cefepime   IVPB      cefepime   IVPB 2000 milliGRAM(s) IV Intermittent every 8 hours  chlorhexidine 0.12% Liquid 15 milliLiter(s) Oral Mucosa every 12 hours  chlorhexidine 4% Liquid 1 Application(s) Topical <User Schedule>  ciprofloxacin  0.3% Ophthalmic Solution 1 Drop(s) Both EYES every 4 hours  dextrose 50% Injectable 50 milliLiter(s) IV Push once  enoxaparin Injectable 40 milliGRAM(s) SubCutaneous every 24 hours  escitalopram 10 milliGRAM(s) Oral daily  gabapentin Solution 100 milliGRAM(s) Enteral Tube at bedtime  insulin glargine Injectable (LANTUS) 14 Unit(s) SubCutaneous every morning  insulin lispro (ADMELOG) corrective regimen sliding scale   SubCutaneous every 6 hours  lactobacillus acidophilus 1 Tablet(s) Oral daily  levothyroxine 125 MICROGram(s) Oral daily  lidocaine   4% Patch 1 Patch Transdermal daily  multivitamin/minerals/iron Oral Solution (CENTRUM) 15 milliLiter(s) Oral daily  nystatin Powder 1 Application(s) Topical every 8 hours  oxybutynin 5 milliGRAM(s) Oral daily  pantoprazole   Suspension 40 milliGRAM(s) Enteral Tube daily  petrolatum Ophthalmic Ointment 1 Application(s) Both EYES four times a day  predniSONE   Tablet 5 milliGRAM(s) Oral daily  QUEtiapine 12.5 milliGRAM(s) Oral <User Schedule>  simethicone 80 milliGRAM(s) Chew four times a day  sodium chloride 3%  Inhalation 4 milliLiter(s) Inhalation every 12 hours  zinc sulfate 220 milliGRAM(s) Oral daily    ALLERGIES: penicillin  heparin  Tagamet  pineapple  walnut  Pecan, Filbert, Hazelnut  Lyrica        SOCIAL HISTORY:  ____________________________________  Social History:  lives at home with family  dependent for all ADL  no toxic habits    FAMILY HISTORY:  no relevant FH in 1st degree relatives      VITAL SIGNS LAST 24 HOURS:  T(F): 99.9 (11-14 @ 19:52), Max: 100 (11-14 @ 12:13)  HR: 91 (11-14 @ 20:23) (75 - 100)  BP: 100/49 (11-14 @ 19:52) (100/49 - 172/88)  RR: 18 (11-14 @ 19:52) (16 - 18)    PHYSICAL EXAM:     The patient was alert and in no apparent distress.  There was no visible lymphadenopathy.  Conjunctiva were non injected  The scalp, hair, face, eyebrows, lips, neck, chest, back,   extremities X 4, nails were examined.  There was no hyperhidrosis or bromhidrosis.    Of note on skin exam:   - stuck on verrucous black plaque on R mid back   - hyperpigmented papule on R upper back   ____________________________________    LABS:                        9.4    6.78  )-----------( 125      ( 14 Nov 2023 12:14 )             31.8     11-14      135  |  98  |  22  ----------------------------<  245<H>  4.2   |  27  |  <0.30<L>    Ca    9.4      14 Nov 2023 12:14  Phos  3.5     11-14  Mg     1.9     11-14    TPro  7.0  /  Alb  3.1<L>  /  TBili  0.3  /  DBili  x   /  AST  21  /  ALT  22  /  AlkPhos  43  11-13      Urinalysis Basic - ( 14 Nov 2023 12:14 )    Color: x / Appearance: x / SG: x / pH: x  Gluc: 245 mg/dL / Ketone: x  / Bili: x / Urobili: x   Blood: x / Protein: x / Nitrite: x   Leuk Esterase: x / RBC: x / WBC x   Sq Epi: x / Non Sq Epi: x / Bacteria: x

## 2023-11-14 NOTE — PROGRESS NOTE ADULT - SUBJECTIVE AND OBJECTIVE BOX
[facility]-- WOUND TEAM -- FOLLOW UP NOTE  --------------------------------------------------------------------------------    24 hour events/subjective:    afebrile  more alert  tolerating TF  tolerating dressings     no odor pain excess drainage  incontinent- multiple loose BM       Diet:  Diet, NPO with Tube Feed:   Tube Feeding Modality: Gastrostomy  Casie Black Peptide 1.5  Total Volume for 24 Hours (mL): 900  Continuous  Starting Tube Feed Rate mL per Hour: 50  Increase Tube Feed Rate by (mL): 0  Until Goal Tube Feed Rate (mL per Hour): 50  Tube Feed Duration (in Hours): 18  Tube Feed Start Time: 06:00  Tube Feed Stop Time: 03:00  Free Water Flush  Pump   Rate (mL per Hour): 10   Frequency: Every 2 Hours  Alfred(7 Gm Arginine/7 Gm Glut/1.2 Gm HMB     Qty per Day:  2 (11-10-23 @ 17:46)      ROS: pt unable to offer    ALLERGIES & MEDICATIONS  --------------------------------------------------------------------------------  Allergies  penicillin (Unknown)  heparin (Unknown)  Tagamet (Unknown)  pineapple (Unknown)  walnut (Unknown)  Andressa Rawls Hazelnut (Unknown)  Lyrica (Unknown)      STANDING INPATIENT MEDICATIONS  albuterol/ipratropium for Nebulization 3 milliLiter(s) Nebulizer every 6 hours  artificial  tears Solution 2 Drop(s) Both EYES four times a day  ascorbic acid 500 milliGRAM(s) Oral daily  calcium carbonate    500 mG (Tums) Chewable 1 Tablet(s) Chew every 12 hours  cefepime   IVPB 2000 milliGRAM(s) IV Intermittent every 8 hours  chlorhexidine 0.12% Liquid 15 milliLiter(s) Oral Mucosa every 12 hours  chlorhexidine 4% Liquid 1 Application(s) Topical <User Schedule>  ciprofloxacin  0.3% Ophthalmic Solution 1 Drop(s) Both EYES every 4 hours  dextrose 50% Injectable 50 milliLiter(s) IV Push once  enoxaparin Injectable 40 milliGRAM(s) SubCutaneous every 24 hours  escitalopram 10 milliGRAM(s) Oral daily  gabapentin Solution 100 milliGRAM(s) Enteral Tube at bedtime  insulin glargine Injectable (LANTUS) 14 Unit(s) SubCutaneous every morning  insulin lispro (ADMELOG) corrective regimen sliding scale   SubCutaneous every 6 hours  lactobacillus acidophilus 1 Tablet(s) Oral daily  levothyroxine 125 MICROGram(s) Oral daily  lidocaine   4% Patch 1 Patch Transdermal daily  multivitamin/minerals/iron Oral Solution (CENTRUM) 15 milliLiter(s) Oral daily  nystatin Powder 1 Application(s) Topical every 8 hours  oxybutynin 5 milliGRAM(s) Oral daily  pantoprazole   Suspension 40 milliGRAM(s) Enteral Tube daily  petrolatum Ophthalmic Ointment 1 Application(s) Both EYES four times a day  predniSONE   Tablet 5 milliGRAM(s) Oral daily  QUEtiapine 12.5 milliGRAM(s) Oral <User Schedule>  simethicone 80 milliGRAM(s) Chew four times a day  sodium chloride 3%  Inhalation 4 milliLiter(s) Inhalation every 12 hours  zinc sulfate 220 milliGRAM(s) Oral daily      PRN INPATIENT MEDICATION  acetaminophen Oral Liquid 1000 milliGRAM(s) Enteral Tube every 6 hours PRN  diphenhydrAMINE Elixir 25 milliGRAM(s) Oral every 6 hours PRN  oxyCODONE Solution 2.5 milliGRAM(s) Oral every 6 hours PRN  sodium chloride 0.65% Nasal 1 Spray(s) Both Nostrils every 6 hours PRN        VITALS/PHYSICAL EXAM  --------------------------------------------------------------------------------  T(C): 37.8 (11-14-23 @ 12:13), Max: 37.8 (11-14-23 @ 12:13)  HR: 89 (11-14-23 @ 15:35) (75 - 100)  BP: 172/88 (11-14-23 @ 12:13) (157/78 - 172/88)  RR: 18 (11-14-23 @ 12:13) (16 - 19)  SpO2: 100% (11-14-23 @ 15:35) (95% - 100%)  Wt(kg): --      NAD,  Alert, frail,  WD/ WN/ WG  Total Care Sport bed  HEENT:  NC/AT, EOMI, sclera clear, mucosa moist, throat clear, trach       w/o secretions or peritrach skin irration  Gastrointestinal: soft NT/ND (+)PEG w/o drainage or skin irration  : (+) primafit  Neurology:  nonverbal, no follow commands, paraplegic  Psych: calm/ appropriate  Musculoskeletal:  pROM, no deformities/ contractures   Vascular: BLE equally warm,  no cyanosis, clubbing nor acute ischemia  Skin:  moist w/ good turgor  Sacral stage 4pressure injury  4.5cm x 2.5cm x 1cm   moist granular wound bed   palpable but not exposed bone  no blistering   (+) serosanguinous drainage  No odor, erythema, increased warmth, tenderness, induration, fluctuance, nor crepitus      LABS/ CULTURES/ RADIOLOGY:              9.4    6.78  >-----------<  125      [11-14-23 @ 12:14]              31.8     135  |  98  |  22  ----------------------------<  245      [11-14-23 @ 12:14]  4.2   |  27  |  <0.30        Ca     9.4     [11-14-23 @ 12:14]      Mg     1.9     [11-14-23 @ 12:14]      Phos  3.5     [11-14-23 @ 12:14]    TPro  7.0  /  Alb  3.1  /  TBili  0.3  /  DBili  x   /  AST  21  /  ALT  22  /  AlkPhos  43  [11-13-23 @ 07:47]      A1C with Estimated Average Glucose Result: 7.5 % (10-28-23 @ 07:18)      CAPILLARY BLOOD GLUCOSE  POCT Blood Glucose.: 267 mg/dL (14 Nov 2023 12:10)  POCT Blood Glucose.: 127 mg/dL (14 Nov 2023 05:40)  POCT Blood Glucose.: 178 mg/dL (13 Nov 2023 23:55)      Culture - Blood (collected 11-12-23 @ 07:09)  Source: .Blood Blood-Peripheral  Preliminary Report (11-14-23 @ 11:01):    No growth at 48 Hours    Culture - Blood (collected 11-12-23 @ 07:00)  Source: .Blood Blood-Peripheral  Preliminary Report (11-14-23 @ 11:01):    No growth at 48 Hours

## 2023-11-14 NOTE — PROGRESS NOTE ADULT - PROBLEM SELECTOR PLAN 3
Chronic Trach/ Vent dependant 2/2 Hypercapnic Resp Failure since 10/2022   -S/p Trach # 8 Cuffed Flex Shiley; trach change 8/18, PEG change 8/14 per records   - Continue Home vent settings: (300 TV/ RR 12/5 Peep/ Fio2 30%)  - Tolerates intermittent PSV 15/5 during day/ Vent HS  - Airway Clearance: Duoneb, Hypersal, Chest PT, PRN suctioning   - Maintain 02 sat > 92%  Tracheal Bleeding (Intermittent)-->resolved   -S/p CT Angio neck: No evidence of active bleeding around tracheostomy site. No hematoma.  No collection   - Advise RN staff to use Red rubber trach catheters to avoid suction trauma   - Seen by ENT; Kath and b/l bronchi visualized without any trauma. small amount of blood tinged secretions resting at beginning of right bronchus not actively bleeding.  - 11/3 trach changed to #9 Portex  11/12 no wean on 11/11, rest on AC today Chronic Trach/ Vent dependant 2/2 Hypercapnic Resp Failure since 10/2022   -S/p Trach # 8 Cuffed Flex Shiley; trach change 8/18, PEG change 8/14 per records   - Continue Home vent settings: (300 TV/ RR 12/5 Peep/ Fio2 30%)  - Tolerates intermittent PSV 15/5 during day/ Vent HS  - Airway Clearance: Duoneb, Hypersal, Chest PT, PRN suctioning   - Maintain 02 sat > 92%  Tracheal Bleeding (Intermittent)-->resolved since admission   -S/p CT Angio neck: No evidence of active bleeding around tracheostomy site. No hematoma.  No collection   - Advise RN staff to use Red rubber trach catheters to avoid suction trauma   - Seen by ENT; Kath and b/l bronchi visualized without any trauma. small amount of blood tinged secretions resting at beginning of right bronchus not actively bleeding.  - 11/3 trach changed to #9 Portex by ENT

## 2023-11-14 NOTE — PROGRESS NOTE ADULT - ASSESSMENT
1) covering for dr. butterfield  2) leaking peg  3) tf running  4) to goal  5) no leak  6) discussed with aide at bedside  follow labs

## 2023-11-14 NOTE — PROGRESS NOTE ADULT - PROBLEM SELECTOR PLAN 8
Continue Home Lexapro 10mg daily   Psych consult apreciated  Continue Seroquel 12.5mg Q12 0600, 1800 Continue Home Lexapro 10mg daily   Psych consult appreciated  Continue Seroquel 12.5mg QD 3PM

## 2023-11-14 NOTE — PROGRESS NOTE ADULT - ASSESSMENT
72 y/o F with PMHx of T2DM, HTN, HLD, anemia, hypothyroidism, RA, fibromyalgia, remote cerebral aneurysm repair, acute hypercapnic respiratory failure now with tracheostomy, PEG tube, and bedbound (trach change 8/18, PEG change 8/14), sacral pressure ulcer, BIBEMS from home for 6 day hx of bleeding from the tracheostomy and PEG tube with associated abdominal pain, recent history of auditory and visual hallucinations, and with chronic sacral decubitus ulcer. Exam notable for mild abdominal distention with LLQ and RLQ tenderness, tracheostomy in place with no obvious bleeding, PEG tube with scant amount of dried blood, at baseline neurologic status. Imaging showing no active bleeding around tracheostomy site, however was notable for mild thickening along PEG tube tract, sacral ulcer extending to the coccyx suggestive of possible osteomyelitis, and bibasilar patchy lung opacities with volume loss. Patient admitted for respiratory support, wound care of tracheostomy and PEG, and management of sacral osteomyelitis. No further Trach and peg site  bleeding noted since admission.  Pelvic MRI imaging also suggestive of Acute OM. Patient placed on long-term antibiotics with Infectious disease guidance.  Additionally treated with a 7d course of Cefepime due to PSA and Serratia tracheitis. Once completed,  plan to change abx back to Cipro 500mg q 12 and Keflex 500mg q 6 to complete a total of 6 weeks. Hospital course c/b Delirium for which Seroquel was added and home Lexapro continued. Tracheostomy changed to #9 Cuffed Portex by ENT 11/3.     11/14: Day 6/7 of Cefepime, had multiple loose stool overnight, Miralax stopped for now, Thrombocytopenia improved today. Will resume oral abx for completion of OM treatment with Cipro 500mg q 12 and Keflex 500mg q 6 until 12/10.    Wound Consult requested to assist w/ management of:  Sacral stage 4 pressure injury      Sacral wound- improving, continue w/Aquacel dressing QD  Trach Mngt as per RCU  PEG Mngt as per GI  BLE elevation  Abx per RCU/ ID  Moisturize intact skin w/ SWEEN cream BID  Nutrition Consult for optimization          encourage high quality protein, ayush/ prosource, MVI & Vit C to promote wound healing  Hyperglycemia -  ADA diet and  FS w/ ISS,  Continue turning and positioning w/ offloading assistive devices as per protocol  Buttock Kanchan BID and prn soiling        Continue w/ attends under pads and Pericare w/ emerson cath maintenance as per protocol  Waffle Cushion to chair when oob to chair  Continue w/ low air loss pressure redistribution bed surface   Pt will need Group 2 mattress on hospital bed and ROHO cushion for wheel chair upon discharge home  Care as per RCU, will follow w/ you  Upon discharge f/u as outpatient at [facility] [address] [facility] [phone]  d/w mary  RN team   Angela Anthony PA-C CWS [id number]  Nights/ Weekends/ Holidays please call:  General Surgery Consult pager ([phone]) for emergencies  Wound PT for multilayer leg wrapping or VAC issues (x [phone])   I spent  35 minutes face to face w/ this pt of which more than 50% of the time was spent counseling & coordinating care of this pt.

## 2023-11-14 NOTE — PROGRESS NOTE ADULT - PROBLEM SELECTOR PLAN 1
Found w/ Bilateral PNA vs Atelectasis, and Possible OM Sacrum   S/p Fevers at home, Afebrile since admission   - S/p Chest CT (10/28):  c/w Bilateral PNA vs Atelectasis   - Started Empirically on Vanco, Aztreonam   - Blood cx: NGTD   - urine culture: negative   - Sputum cx + Pseudomonas aeruginosa, S. aureus  - Sputum Cx 11/6: + PSA and Serratia, sensitivities pending   - Cefepime 2GM Q8H started 11/8 -->? Found w/ Bilateral PNA vs Atelectasis, and Possible OM Sacrum   S/p Fevers at home, Afebrile since admission   - S/p Chest CT (10/28):  c/w Bilateral PNA vs Atelectasis   - Started Empirically on Vanco, Aztreonam   - Blood cx: NGTD   - urine culture: negative   - Sputum cx + Pseudomonas aeruginosa, S. aureus  - Sputum Cx 11/6: + PSA and Serratia, Cefepime 11/8 -->11/15

## 2023-11-14 NOTE — PROGRESS NOTE ADULT - PROBLEM SELECTOR PLAN 1
MRI confirms acute osteomyelitis of sacrum.  Sputum culture showing Pseudomonas and serratia. Antibiotics to continue.    ID to FU

## 2023-11-14 NOTE — PROGRESS NOTE ADULT - PROBLEM SELECTOR PLAN 4
-S/p CT Abd/Pelvis: No emergent findings or active bleed; 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; 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   -Bowel regimen  - PEG Site appears macerated. Multifactorial, possible the PEG has been too tight at home.  -Continue Simthicone QID -S/p CT Abd/Pelvis: No emergent findings or active bleed; 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; 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   -Bowel regimen  - PEG Site appears macerated. Multifactorial, possible the PEG has been too tight at home.  - Peg loosened by GI at beside, no leakage or further intervention required   -Continue simethicone

## 2023-11-15 LAB
ANION GAP SERPL CALC-SCNC: 8 MMOL/L — SIGNIFICANT CHANGE UP (ref 5–17)
ANION GAP SERPL CALC-SCNC: 8 MMOL/L — SIGNIFICANT CHANGE UP (ref 5–17)
BUN SERPL-MCNC: 43 MG/DL — HIGH (ref 7–23)
BUN SERPL-MCNC: 43 MG/DL — HIGH (ref 7–23)
CALCIUM SERPL-MCNC: 9.9 MG/DL — SIGNIFICANT CHANGE UP (ref 8.4–10.5)
CALCIUM SERPL-MCNC: 9.9 MG/DL — SIGNIFICANT CHANGE UP (ref 8.4–10.5)
CHLORIDE SERPL-SCNC: 100 MMOL/L — SIGNIFICANT CHANGE UP (ref 96–108)
CHLORIDE SERPL-SCNC: 100 MMOL/L — SIGNIFICANT CHANGE UP (ref 96–108)
CO2 SERPL-SCNC: 28 MMOL/L — SIGNIFICANT CHANGE UP (ref 22–31)
CO2 SERPL-SCNC: 28 MMOL/L — SIGNIFICANT CHANGE UP (ref 22–31)
CREAT SERPL-MCNC: <0.3 MG/DL — LOW (ref 0.5–1.3)
CREAT SERPL-MCNC: <0.3 MG/DL — LOW (ref 0.5–1.3)
EGFR: 113 ML/MIN/1.73M2 — SIGNIFICANT CHANGE UP
EGFR: 113 ML/MIN/1.73M2 — SIGNIFICANT CHANGE UP
GLUCOSE BLDC GLUCOMTR-MCNC: 116 MG/DL — HIGH (ref 70–99)
GLUCOSE BLDC GLUCOMTR-MCNC: 116 MG/DL — HIGH (ref 70–99)
GLUCOSE BLDC GLUCOMTR-MCNC: 172 MG/DL — HIGH (ref 70–99)
GLUCOSE BLDC GLUCOMTR-MCNC: 172 MG/DL — HIGH (ref 70–99)
GLUCOSE BLDC GLUCOMTR-MCNC: 200 MG/DL — HIGH (ref 70–99)
GLUCOSE BLDC GLUCOMTR-MCNC: 200 MG/DL — HIGH (ref 70–99)
GLUCOSE BLDC GLUCOMTR-MCNC: 220 MG/DL — HIGH (ref 70–99)
GLUCOSE BLDC GLUCOMTR-MCNC: 220 MG/DL — HIGH (ref 70–99)
GLUCOSE BLDC GLUCOMTR-MCNC: 256 MG/DL — HIGH (ref 70–99)
GLUCOSE BLDC GLUCOMTR-MCNC: 256 MG/DL — HIGH (ref 70–99)
GLUCOSE SERPL-MCNC: 118 MG/DL — HIGH (ref 70–99)
GLUCOSE SERPL-MCNC: 118 MG/DL — HIGH (ref 70–99)
HCT VFR BLD CALC: 28.9 % — LOW (ref 34.5–45)
HCT VFR BLD CALC: 28.9 % — LOW (ref 34.5–45)
HGB BLD-MCNC: 8.4 G/DL — LOW (ref 11.5–15.5)
HGB BLD-MCNC: 8.4 G/DL — LOW (ref 11.5–15.5)
MAGNESIUM SERPL-MCNC: 2.1 MG/DL — SIGNIFICANT CHANGE UP (ref 1.6–2.6)
MAGNESIUM SERPL-MCNC: 2.1 MG/DL — SIGNIFICANT CHANGE UP (ref 1.6–2.6)
MCHC RBC-ENTMCNC: 28.1 PG — SIGNIFICANT CHANGE UP (ref 27–34)
MCHC RBC-ENTMCNC: 28.1 PG — SIGNIFICANT CHANGE UP (ref 27–34)
MCHC RBC-ENTMCNC: 29.1 GM/DL — LOW (ref 32–36)
MCHC RBC-ENTMCNC: 29.1 GM/DL — LOW (ref 32–36)
MCV RBC AUTO: 96.7 FL — SIGNIFICANT CHANGE UP (ref 80–100)
MCV RBC AUTO: 96.7 FL — SIGNIFICANT CHANGE UP (ref 80–100)
NRBC # BLD: 0 /100 WBCS — SIGNIFICANT CHANGE UP (ref 0–0)
NRBC # BLD: 0 /100 WBCS — SIGNIFICANT CHANGE UP (ref 0–0)
PHOSPHATE SERPL-MCNC: 4.4 MG/DL — SIGNIFICANT CHANGE UP (ref 2.5–4.5)
PHOSPHATE SERPL-MCNC: 4.4 MG/DL — SIGNIFICANT CHANGE UP (ref 2.5–4.5)
PLATELET # BLD AUTO: 121 K/UL — LOW (ref 150–400)
PLATELET # BLD AUTO: 121 K/UL — LOW (ref 150–400)
POTASSIUM SERPL-MCNC: 4.3 MMOL/L — SIGNIFICANT CHANGE UP (ref 3.5–5.3)
POTASSIUM SERPL-MCNC: 4.3 MMOL/L — SIGNIFICANT CHANGE UP (ref 3.5–5.3)
POTASSIUM SERPL-SCNC: 4.3 MMOL/L — SIGNIFICANT CHANGE UP (ref 3.5–5.3)
POTASSIUM SERPL-SCNC: 4.3 MMOL/L — SIGNIFICANT CHANGE UP (ref 3.5–5.3)
RBC # BLD: 2.99 M/UL — LOW (ref 3.8–5.2)
RBC # BLD: 2.99 M/UL — LOW (ref 3.8–5.2)
RBC # FLD: 19 % — HIGH (ref 10.3–14.5)
RBC # FLD: 19 % — HIGH (ref 10.3–14.5)
SODIUM SERPL-SCNC: 136 MMOL/L — SIGNIFICANT CHANGE UP (ref 135–145)
SODIUM SERPL-SCNC: 136 MMOL/L — SIGNIFICANT CHANGE UP (ref 135–145)
WBC # BLD: 7.05 K/UL — SIGNIFICANT CHANGE UP (ref 3.8–10.5)
WBC # BLD: 7.05 K/UL — SIGNIFICANT CHANGE UP (ref 3.8–10.5)
WBC # FLD AUTO: 7.05 K/UL — SIGNIFICANT CHANGE UP (ref 3.8–10.5)
WBC # FLD AUTO: 7.05 K/UL — SIGNIFICANT CHANGE UP (ref 3.8–10.5)

## 2023-11-15 PROCEDURE — 99232 SBSQ HOSP IP/OBS MODERATE 35: CPT

## 2023-11-15 PROCEDURE — 99233 SBSQ HOSP IP/OBS HIGH 50: CPT

## 2023-11-15 PROCEDURE — 93010 ELECTROCARDIOGRAM REPORT: CPT

## 2023-11-15 RX ORDER — OXYCODONE HYDROCHLORIDE 5 MG/1
2.5 TABLET ORAL EVERY 6 HOURS
Refills: 0 | Status: DISCONTINUED | OUTPATIENT
Start: 2023-11-15 | End: 2023-11-18

## 2023-11-15 RX ORDER — LANOLIN ALCOHOL/MO/W.PET/CERES
3 CREAM (GRAM) TOPICAL AT BEDTIME
Refills: 0 | Status: DISCONTINUED | OUTPATIENT
Start: 2023-11-15 | End: 2023-11-29

## 2023-11-15 RX ORDER — QUETIAPINE FUMARATE 200 MG/1
12.5 TABLET, FILM COATED ORAL
Refills: 0 | Status: DISCONTINUED | OUTPATIENT
Start: 2023-11-15 | End: 2023-11-21

## 2023-11-15 RX ADMIN — Medication 1 APPLICATION(S): at 11:43

## 2023-11-15 RX ADMIN — LIDOCAINE 1 PATCH: 4 CREAM TOPICAL at 19:45

## 2023-11-15 RX ADMIN — NYSTATIN CREAM 1 APPLICATION(S): 100000 CREAM TOPICAL at 22:19

## 2023-11-15 RX ADMIN — Medication 1 DROP(S): at 14:17

## 2023-11-15 RX ADMIN — CEFEPIME 100 MILLIGRAM(S): 1 INJECTION, POWDER, FOR SOLUTION INTRAMUSCULAR; INTRAVENOUS at 14:17

## 2023-11-15 RX ADMIN — Medication 2: at 23:59

## 2023-11-15 RX ADMIN — NYSTATIN CREAM 1 APPLICATION(S): 100000 CREAM TOPICAL at 05:47

## 2023-11-15 RX ADMIN — Medication 1 APPLICATION(S): at 05:47

## 2023-11-15 RX ADMIN — Medication 3 MILLIGRAM(S): at 22:19

## 2023-11-15 RX ADMIN — Medication 5 MILLIGRAM(S): at 05:43

## 2023-11-15 RX ADMIN — CHLORHEXIDINE GLUCONATE 15 MILLILITER(S): 213 SOLUTION TOPICAL at 17:13

## 2023-11-15 RX ADMIN — Medication 1 DROP(S): at 11:45

## 2023-11-15 RX ADMIN — Medication 15 MILLILITER(S): at 11:41

## 2023-11-15 RX ADMIN — Medication 2 DROP(S): at 17:13

## 2023-11-15 RX ADMIN — OXYCODONE HYDROCHLORIDE 2.5 MILLIGRAM(S): 5 TABLET ORAL at 19:55

## 2023-11-15 RX ADMIN — Medication 125 MICROGRAM(S): at 05:51

## 2023-11-15 RX ADMIN — Medication 2 DROP(S): at 11:41

## 2023-11-15 RX ADMIN — ZINC SULFATE TAB 220 MG (50 MG ZINC EQUIVALENT) 220 MILLIGRAM(S): 220 (50 ZN) TAB at 11:41

## 2023-11-15 RX ADMIN — Medication 1000 MILLIGRAM(S): at 19:41

## 2023-11-15 RX ADMIN — Medication 3 MILLILITER(S): at 17:19

## 2023-11-15 RX ADMIN — SIMETHICONE 80 MILLIGRAM(S): 80 TABLET, CHEWABLE ORAL at 11:42

## 2023-11-15 RX ADMIN — Medication 2 DROP(S): at 23:59

## 2023-11-15 RX ADMIN — SIMETHICONE 80 MILLIGRAM(S): 80 TABLET, CHEWABLE ORAL at 05:43

## 2023-11-15 RX ADMIN — Medication 4: at 00:41

## 2023-11-15 RX ADMIN — PANTOPRAZOLE SODIUM 40 MILLIGRAM(S): 20 TABLET, DELAYED RELEASE ORAL at 11:42

## 2023-11-15 RX ADMIN — Medication 3 MILLILITER(S): at 11:07

## 2023-11-15 RX ADMIN — SIMETHICONE 80 MILLIGRAM(S): 80 TABLET, CHEWABLE ORAL at 17:13

## 2023-11-15 RX ADMIN — GABAPENTIN 100 MILLIGRAM(S): 400 CAPSULE ORAL at 22:19

## 2023-11-15 RX ADMIN — NYSTATIN CREAM 1 APPLICATION(S): 100000 CREAM TOPICAL at 14:17

## 2023-11-15 RX ADMIN — Medication 1 TABLET(S): at 17:14

## 2023-11-15 RX ADMIN — LIDOCAINE 1 PATCH: 4 CREAM TOPICAL at 02:47

## 2023-11-15 RX ADMIN — Medication 3 MILLILITER(S): at 05:47

## 2023-11-15 RX ADMIN — SIMETHICONE 80 MILLIGRAM(S): 80 TABLET, CHEWABLE ORAL at 22:10

## 2023-11-15 RX ADMIN — Medication 1 TABLET(S): at 11:41

## 2023-11-15 RX ADMIN — ENOXAPARIN SODIUM 40 MILLIGRAM(S): 100 INJECTION SUBCUTANEOUS at 06:18

## 2023-11-15 RX ADMIN — CEFEPIME 100 MILLIGRAM(S): 1 INJECTION, POWDER, FOR SOLUTION INTRAMUSCULAR; INTRAVENOUS at 06:00

## 2023-11-15 RX ADMIN — QUETIAPINE FUMARATE 12.5 MILLIGRAM(S): 200 TABLET, FILM COATED ORAL at 14:16

## 2023-11-15 RX ADMIN — Medication 1 APPLICATION(S): at 05:58

## 2023-11-15 RX ADMIN — Medication 1 DROP(S): at 17:13

## 2023-11-15 RX ADMIN — Medication 500 MILLIGRAM(S): at 11:41

## 2023-11-15 RX ADMIN — Medication 1 DROP(S): at 06:00

## 2023-11-15 RX ADMIN — CHLORHEXIDINE GLUCONATE 1 APPLICATION(S): 213 SOLUTION TOPICAL at 05:48

## 2023-11-15 RX ADMIN — Medication 5 MILLIGRAM(S): at 11:41

## 2023-11-15 RX ADMIN — Medication 1 APPLICATION(S): at 17:14

## 2023-11-15 RX ADMIN — Medication 1 TABLET(S): at 05:43

## 2023-11-15 RX ADMIN — Medication 2 DROP(S): at 05:58

## 2023-11-15 RX ADMIN — ESCITALOPRAM OXALATE 10 MILLIGRAM(S): 10 TABLET, FILM COATED ORAL at 11:42

## 2023-11-15 RX ADMIN — Medication 6: at 11:55

## 2023-11-15 RX ADMIN — Medication 1 DROP(S): at 22:19

## 2023-11-15 RX ADMIN — SODIUM CHLORIDE 4 MILLILITER(S): 9 INJECTION INTRAMUSCULAR; INTRAVENOUS; SUBCUTANEOUS at 05:47

## 2023-11-15 RX ADMIN — Medication 2: at 17:15

## 2023-11-15 RX ADMIN — LIDOCAINE 1 PATCH: 4 CREAM TOPICAL at 11:42

## 2023-11-15 RX ADMIN — SODIUM CHLORIDE 4 MILLILITER(S): 9 INJECTION INTRAMUSCULAR; INTRAVENOUS; SUBCUTANEOUS at 17:31

## 2023-11-15 RX ADMIN — QUETIAPINE FUMARATE 12.5 MILLIGRAM(S): 200 TABLET, FILM COATED ORAL at 19:56

## 2023-11-15 RX ADMIN — INSULIN GLARGINE 14 UNIT(S): 100 INJECTION, SOLUTION SUBCUTANEOUS at 06:18

## 2023-11-15 RX ADMIN — Medication 1 APPLICATION(S): at 17:12

## 2023-11-15 RX ADMIN — CEFEPIME 100 MILLIGRAM(S): 1 INJECTION, POWDER, FOR SOLUTION INTRAMUSCULAR; INTRAVENOUS at 22:18

## 2023-11-15 RX ADMIN — CHLORHEXIDINE GLUCONATE 15 MILLILITER(S): 213 SOLUTION TOPICAL at 05:59

## 2023-11-15 RX ADMIN — Medication 3 MILLILITER(S): at 00:26

## 2023-11-15 RX ADMIN — Medication 1 APPLICATION(S): at 17:13

## 2023-11-15 RX ADMIN — Medication 1000 MILLIGRAM(S): at 18:46

## 2023-11-15 RX ADMIN — Medication 3 MILLILITER(S): at 23:39

## 2023-11-15 NOTE — BH CONSULTATION LIAISON PROGRESS NOTE - NSBHCHARTREVIEWVS_PSY_A_CORE FT
Vital Signs Last 24 Hrs  T(C): 37.6 (10 Nov 2023 12:03), Max: 37.6 (10 Nov 2023 12:03)  T(F): 99.7 (10 Nov 2023 12:03), Max: 99.7 (10 Nov 2023 12:03)  HR: 81 (10 Nov 2023 14:53) (62 - 81)  BP: 136/73 (10 Nov 2023 12:03) (136/73 - 168/73)  BP(mean): --  RR: 18 (10 Nov 2023 12:03) (12 - 22)  SpO2: 100% (10 Nov 2023 14:53) (98% - 100%)    Parameters below as of 10 Nov 2023 12:03  Patient On (Oxygen Delivery Method): ventilator    
Vital Signs Last 24 Hrs  T(C): 37.8 (14 Nov 2023 12:13), Max: 37.8 (14 Nov 2023 12:13)  T(F): 100 (14 Nov 2023 12:13), Max: 100 (14 Nov 2023 12:13)  HR: 89 (14 Nov 2023 15:35) (75 - 100)  BP: 172/88 (14 Nov 2023 12:13) (157/78 - 172/88)  BP(mean): --  RR: 18 (14 Nov 2023 12:13) (16 - 19)  SpO2: 100% (14 Nov 2023 15:35) (95% - 100%)    Parameters below as of 14 Nov 2023 12:13  Patient On (Oxygen Delivery Method): ventilator    
Vital Signs Last 24 Hrs  T(C): 36.6 (15 Nov 2023 13:21), Max: 37.7 (14 Nov 2023 19:52)  T(F): 97.9 (15 Nov 2023 13:21), Max: 99.9 (14 Nov 2023 19:52)  HR: 98 (15 Nov 2023 15:14) (76 - 98)  BP: 153/79 (15 Nov 2023 13:21) (100/49 - 153/79)  BP(mean): --  RR: 21 (15 Nov 2023 15:14) (18 - 21)  SpO2: 100% (15 Nov 2023 15:14) (98% - 100%)    Parameters below as of 15 Nov 2023 15:14  Patient On (Oxygen Delivery Method): ventilator    
Vital Signs Last 24 Hrs  T(C): 36.2 (13 Nov 2023 11:09), Max: 37.3 (13 Nov 2023 05:27)  T(F): 97.1 (13 Nov 2023 11:09), Max: 99.1 (13 Nov 2023 05:27)  HR: 81 (13 Nov 2023 16:10) (74 - 89)  BP: 132/60 (13 Nov 2023 13:29) (124/67 - 154/55)  BP(mean): --  RR: 18 (13 Nov 2023 09:02) (13 - 20)  SpO2: 100% (13 Nov 2023 16:10) (97% - 100%)    Parameters below as of 13 Nov 2023 13:29  Patient On (Oxygen Delivery Method): ventilator

## 2023-11-15 NOTE — PROGRESS NOTE ADULT - NS ATTEND AMEND GEN_ALL_CORE FT
73F with a h/o DM, HTN, HLD, anemia, hypothyroidism, RA, fibromyalgia, remote cerebral aneurysm with repair, hypercapnic respiratory failure s/p tracheostomy/PEG, bedbound, sacral pressure ulcer. Admitted w/ bleeding from tracheostomy and PEG w/ associated abdominal pain, recent hx of auditory/visual hallucinations. Since admission, no further bleeding noted from either trach or PEG. Imaging showed mild thickening along PEG tube tract and sacral ulcer extending to coccyx suggestive of OM.     MRI with osteomyelitis  Repeat sputum now with serratia, increased secretions back on abx.  Improving delirium, patient awake and appropriately interacting this morning.     # Osteomyelitis  # Chronic hypoxemic RF  # oropharyngeal dysphagia  # acute on chronic pain  # Trach/Peg bleeding  # Tracheitis with PA and serratia  # Hx of hallucination, anxiety    - MRI showing sacral Osteo, on abx per ID, c/w wound care, no plans for surgery. Will need prolonged course of abx. Afebrile, cultures from 11/12 NGTD. Last day of Cefepime, then will switch to Cipro and Keflex.  Will f/u with ID.   - CXR 11/12 with LLL atelectasis, being treated with standing Duonebs and 3% Hypersal, chest PT. CXR appears improved and secretions mild  - PS 10/5 during the day, vent at night, and will need vent on discharge  - Sputum culture with PA and serratia, last day of Cefepime.   - family requested S/S eval but unable to given dependence on vent  - C/W pain control, multifactorial 2/2 to fibromyalgia as well as now osteo with pressure ulcer.    - Peg irritation seen by GI, no recs for further interventions. H/H stable  - Thrombocytopenia - improved   - Appreciate  follow up for delirium and hallucinations - will increase Seroquel 12.5 mg at 3 pm and another 12.5 mg at 8 pm. Melatonin QHS for sleep. c/w Lexapro at current dose.  - Corneal erythema, no evidence of discharge- day 3/3 course of Cipro eye drops, c/w artificial tears  - Appreciate derm consult for right back skin lesion- dermatofibroma, benign lesion. Mid back with irritated seborrheic keratosis, topical triamcinolone 0.1% bid   - DVT ppx- lovenox  - Dispo- full code.

## 2023-11-15 NOTE — BH CONSULTATION LIAISON PROGRESS NOTE - NSBHMSESPABN_PSY_A_CORE
Soft volume/Slowed rate/Decreased productivity/Other

## 2023-11-15 NOTE — PROGRESS NOTE ADULT - PROBLEM SELECTOR PLAN 4
-S/p CT Abd/Pelvis: No emergent findings or active bleed; 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; 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   -Bowel regimen  - PEG Site appears macerated. Multifactorial, possible the PEG has been too tight at home.  - Peg loosened by GI at beside, no leakage or further intervention required   -Continue simethicone

## 2023-11-15 NOTE — BH CONSULTATION LIAISON PROGRESS NOTE - NSBHPSYCHOLCOGABN_PSY_A_CORE
disoriented to time
disoriented to time/disoriented to place

## 2023-11-15 NOTE — BH CONSULTATION LIAISON PROGRESS NOTE - NSBHCONSULTRECOMMENDOTHER_PSY_A_CORE FT
-adjust Seroquel to 25mg qhs for sleep (and for AHs and delirium)  -continue with Lexapro 10mg for anxiety  - melatonin 3mg p.o. at bedtime
-adjust Seroquel to 25mg qhs for sleep (and for AHs and delirium)  -continue with Lexapro 10mg for anxiety
-adjust Seroquel to 25mg qhs for sleep (and for AHs and delirium)  -continue with Lexapro 10mg for anxiety  - melatonin 3mg p.o. at bedtime
-adjust Seroquel to 25mg qhs for sleep (and for AHs and delirium)  -continue with Lexapro 10mg for anxiety

## 2023-11-15 NOTE — BH CONSULTATION LIAISON PROGRESS NOTE - NSICDXBHSECONDARYDX_PSY_ALL_CORE
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  
Acute respiratory failure with hypoxia   J96.01  Type 2 diabetes mellitus   E11.9  Sacral osteomyelitis   M46.28  Hallucination   R44.3  
Acute respiratory failure with hypoxia   J96.01  Type 2 diabetes mellitus   E11.9  Sacral osteomyelitis   M46.28  Hallucination   R44.3

## 2023-11-15 NOTE — PROGRESS NOTE ADULT - PROBLEM SELECTOR PLAN 1
Found w/ Bilateral PNA vs Atelectasis, and Possible OM Sacrum   S/p Fevers at home, Afebrile since admission   - S/p Chest CT (10/28):  c/w Bilateral PNA vs Atelectasis   - Started Empirically on Vanco, Aztreonam   - Blood cx: NGTD   - urine culture: negative   - Sputum cx + Pseudomonas aeruginosa, S. aureus  - Sputum Cx 11/6: + PSA and Serratia, Cefepime 11/8 -->11/15

## 2023-11-15 NOTE — BH CONSULTATION LIAISON PROGRESS NOTE - NSBHHPIREASONCLOTHER_PSY_A_CORE FT
visual and auditory hallucinations

## 2023-11-15 NOTE — PROGRESS NOTE ADULT - SUBJECTIVE AND OBJECTIVE BOX
Patient is a 73y old  Female who presents with a chief complaint of dysphagia (15 Nov 2023 06:25)      Interval Events:    REVIEW OF SYSTEMS:  [ ] Positive  [ ] All other systems negative  [ ] Unable to assess ROS because ________    Vital Signs Last 24 Hrs  T(C): 37 (11-15-23 @ 05:00), Max: 37.8 (11-14-23 @ 12:13)  T(F): 98.6 (11-15-23 @ 05:00), Max: 100 (11-14-23 @ 12:13)  HR: 83 (11-15-23 @ 05:52) (78 - 100)  BP: 115/63 (11-15-23 @ 05:00) (100/49 - 172/88)  RR: 18 (11-15-23 @ 05:00) (18 - 18)  SpO2: 100% (11-15-23 @ 05:52) (95% - 100%)PHYSICAL EXAM:  HEENT:   [ ]Tracheostomy:  [ ]Pupils equal  [ ]No oral lesions  [ ]Abnormal        SKIN  [ ]No Rash  [ ] Abnormal  [ ] pressure    CARDIAC  [ ]Regular  [ ]Abnormal    PULMONARY  [ ]Bilateral Clear Breath Sounds  [ ]Normal Excursion  [ ]Abnormal    GI  [ ]PEG      [ ] +BS		              [ ]Soft, nondistended, nontender	  [ ]Abnormal    MUSCULOSKELETAL                                   [ ]Bedbound                 [ ]Abnormal    [ ]Ambulatory/OOB to chair                           EXTREMITIES                                         [ ]Normal  [ ]Edema                           NEUROLOGIC  [ ] Normal, non focal  [ ] Focal findings:    PSYCHIATRIC  [ ]Alert and appropriate  [ ] Sedated	 [ ]Agitated    :  Mcbride: [ ] Yes, if yes: Date of Placement:                   [  ] No    LINES: Central Lines [ ] Yes, if yes: Date of Placement                                     [  ] No    HOSPITAL MEDICATIONS:  MEDICATIONS  (STANDING):  albuterol/ipratropium for Nebulization 3 milliLiter(s) Nebulizer every 6 hours  artificial  tears Solution 2 Drop(s) Both EYES four times a day  ascorbic acid 500 milliGRAM(s) Oral daily  bacitracin   Ointment 1 Application(s) Topical two times a day  calcium carbonate    500 mG (Tums) Chewable 1 Tablet(s) Chew every 12 hours  cefepime   IVPB 2000 milliGRAM(s) IV Intermittent every 8 hours  cefepime   IVPB      chlorhexidine 0.12% Liquid 15 milliLiter(s) Oral Mucosa every 12 hours  chlorhexidine 4% Liquid 1 Application(s) Topical <User Schedule>  ciprofloxacin  0.3% Ophthalmic Solution 1 Drop(s) Both EYES every 4 hours  dextrose 50% Injectable 50 milliLiter(s) IV Push once  enoxaparin Injectable 40 milliGRAM(s) SubCutaneous every 24 hours  escitalopram 10 milliGRAM(s) Oral daily  gabapentin Solution 100 milliGRAM(s) Enteral Tube at bedtime  insulin glargine Injectable (LANTUS) 14 Unit(s) SubCutaneous every morning  insulin lispro (ADMELOG) corrective regimen sliding scale   SubCutaneous every 6 hours  lactobacillus acidophilus 1 Tablet(s) Oral daily  levothyroxine 125 MICROGram(s) Oral daily  lidocaine   4% Patch 1 Patch Transdermal daily  multivitamin/minerals/iron Oral Solution (CENTRUM) 15 milliLiter(s) Oral daily  nystatin Powder 1 Application(s) Topical every 8 hours  oxybutynin 5 milliGRAM(s) Oral daily  pantoprazole   Suspension 40 milliGRAM(s) Enteral Tube daily  petrolatum Ophthalmic Ointment 1 Application(s) Both EYES four times a day  predniSONE   Tablet 5 milliGRAM(s) Oral daily  QUEtiapine 12.5 milliGRAM(s) Oral <User Schedule>  simethicone 80 milliGRAM(s) Chew four times a day  sodium chloride 3%  Inhalation 4 milliLiter(s) Inhalation every 12 hours  triamcinolone 0.1% Ointment 1 Application(s) Topical two times a day  zinc sulfate 220 milliGRAM(s) Oral daily    MEDICATIONS  (PRN):  acetaminophen   Oral Liquid .. 1000 milliGRAM(s) Enteral Tube every 6 hours PRN Temp greater or equal to 38C (100.4F), Mild Pain (1 - 3)  diphenhydrAMINE Elixir 25 milliGRAM(s) Oral every 6 hours PRN Rash and/or Itching  oxyCODONE    Solution 2.5 milliGRAM(s) Oral every 6 hours PRN Moderate Pain (4 - 6)  sodium chloride 0.65% Nasal 1 Spray(s) Both Nostrils every 6 hours PRN Nasal Congestion      LABS:                        8.4    7.05  )-----------( 121      ( 15 Nov 2023 06:40 )             28.9     11-15    136  |  100  |  43<H>  ----------------------------<  118<H>  4.3   |  28  |  <0.30<L>    Ca    9.9      15 Nov 2023 06:40  Phos  4.4     11-15  Mg     2.1     11-15        Urinalysis Basic - ( 15 Nov 2023 06:40 )    Color: x / Appearance: x / SG: x / pH: x  Gluc: 118 mg/dL / Ketone: x  / Bili: x / Urobili: x   Blood: x / Protein: x / Nitrite: x   Leuk Esterase: x / RBC: x / WBC x   Sq Epi: x / Non Sq Epi: x / Bacteria: x          CAPILLARY BLOOD GLUCOSE    MICROBIOLOGY:     RADIOLOGY:  [ ] Reviewed and interpreted by me    Mode: AC/ CMV (Assist Control/ Continuous Mandatory Ventilation)  RR (machine): 12  TV (machine): 300  FiO2: 30  PEEP: 5  ITime: 1  MAP: 7  PIP: 29   Patient is a 73y old  Female who presents with a chief complaint of dysphagia (15 Nov 2023 06:25)      Interval Events: Last Day of Cefepime     REVIEW OF SYSTEMS:  [x ] Positive- Daughter reporting episode of Delirium overnight   [ ] All other systems negative  [ ] Unable to assess ROS because ________    Vital Signs Last 24 Hrs  T(C): 37 (11-15-23 @ 05:00), Max: 37.8 (11-14-23 @ 12:13)  T(F): 98.6 (11-15-23 @ 05:00), Max: 100 (11-14-23 @ 12:13)  HR: 83 (11-15-23 @ 05:52) (78 - 100)  BP: 115/63 (11-15-23 @ 05:00) (100/49 - 172/88)  RR: 18 (11-15-23 @ 05:00) (18 - 18)  SpO2: 100% (11-15-23 @ 05:52) (95% - 100%)    PHYSICAL EXAM:    HEENT:   [ X ]Tracheostomy: #9 Portex Cuffed   [ X ]Pupils equal, conjunctival redness  [ ]No oral lesions  [x ]Abnormal-bilateral eye redness, no drainage    SKIN  [ X ] No Rash  [ X ] Abnormal - sacral wound  [ ] pressure    CARDIAC  [ X ]Regular  [ ]Abnormal    PULMONARY  [x ] Bilateral Clear Breath Sounds  [ ]Normal Excursion  [ X ] Abnormal - diminished BS at base L > R     GI  [X] PEG c/d/i      [ X] +BS		              [X] Soft, nondistended, nontender	  [ ]Abnormal    MUSCULOSKELETAL                                   [X] Bedbound                 [ ]Abnormal    [ ]Ambulatory/OOB to chair                           EXTREMITIES                                         [X]Normal  [ ]Edema                           NEUROLOGIC  [X] Normal, non focal  [ ] Focal findings:    PSYCHIATRIC  [X] Alert and appropriate  [ ] Sedated	 [ ]Agitated    :  Mcbride: [ ] Yes, if yes: Date of Placement:                   [X] No    LINES: Central Lines [ ] Yes, if yes: Date of Placement                                     [X] No    HOSPITAL MEDICATIONS:  MEDICATIONS  (STANDING):  albuterol/ipratropium for Nebulization 3 milliLiter(s) Nebulizer every 6 hours  artificial  tears Solution 2 Drop(s) Both EYES four times a day  ascorbic acid 500 milliGRAM(s) Oral daily  bacitracin   Ointment 1 Application(s) Topical two times a day  calcium carbonate    500 mG (Tums) Chewable 1 Tablet(s) Chew every 12 hours  cefepime   IVPB 2000 milliGRAM(s) IV Intermittent every 8 hours  cefepime   IVPB      chlorhexidine 0.12% Liquid 15 milliLiter(s) Oral Mucosa every 12 hours  chlorhexidine 4% Liquid 1 Application(s) Topical <User Schedule>  ciprofloxacin  0.3% Ophthalmic Solution 1 Drop(s) Both EYES every 4 hours  dextrose 50% Injectable 50 milliLiter(s) IV Push once  enoxaparin Injectable 40 milliGRAM(s) SubCutaneous every 24 hours  escitalopram 10 milliGRAM(s) Oral daily  gabapentin Solution 100 milliGRAM(s) Enteral Tube at bedtime  insulin glargine Injectable (LANTUS) 14 Unit(s) SubCutaneous every morning  insulin lispro (ADMELOG) corrective regimen sliding scale   SubCutaneous every 6 hours  lactobacillus acidophilus 1 Tablet(s) Oral daily  levothyroxine 125 MICROGram(s) Oral daily  lidocaine   4% Patch 1 Patch Transdermal daily  multivitamin/minerals/iron Oral Solution (CENTRUM) 15 milliLiter(s) Oral daily  nystatin Powder 1 Application(s) Topical every 8 hours  oxybutynin 5 milliGRAM(s) Oral daily  pantoprazole   Suspension 40 milliGRAM(s) Enteral Tube daily  petrolatum Ophthalmic Ointment 1 Application(s) Both EYES four times a day  predniSONE   Tablet 5 milliGRAM(s) Oral daily  QUEtiapine 12.5 milliGRAM(s) Oral <User Schedule>  simethicone 80 milliGRAM(s) Chew four times a day  sodium chloride 3%  Inhalation 4 milliLiter(s) Inhalation every 12 hours  triamcinolone 0.1% Ointment 1 Application(s) Topical two times a day  zinc sulfate 220 milliGRAM(s) Oral daily    MEDICATIONS  (PRN):  acetaminophen   Oral Liquid .. 1000 milliGRAM(s) Enteral Tube every 6 hours PRN Temp greater or equal to 38C (100.4F), Mild Pain (1 - 3)  diphenhydrAMINE Elixir 25 milliGRAM(s) Oral every 6 hours PRN Rash and/or Itching  oxyCODONE    Solution 2.5 milliGRAM(s) Oral every 6 hours PRN Moderate Pain (4 - 6)  sodium chloride 0.65% Nasal 1 Spray(s) Both Nostrils every 6 hours PRN Nasal Congestion      LABS:                        8.4    7.05  )-----------( 121      ( 15 Nov 2023 06:40 )             28.9     11-15    136  |  100  |  43<H>  ----------------------------<  118<H>  4.3   |  28  |  <0.30<L>    Ca    9.9      15 Nov 2023 06:40  Phos  4.4     11-15  Mg     2.1     11-15        Urinalysis Basic - ( 15 Nov 2023 06:40 )    Color: x / Appearance: x / SG: x / pH: x  Gluc: 118 mg/dL / Ketone: x  / Bili: x / Urobili: x   Blood: x / Protein: x / Nitrite: x   Leuk Esterase: x / RBC: x / WBC x   Sq Epi: x / Non Sq Epi: x / Bacteria: x          CAPILLARY BLOOD GLUCOSE    MICROBIOLOGY:     RADIOLOGY:  [ ] Reviewed and interpreted by me    Mode: AC/ CMV (Assist Control/ Continuous Mandatory Ventilation)  RR (machine): 12  TV (machine): 300  FiO2: 30  PEEP: 5  ITime: 1  MAP: 7  PIP: 29   Patient is a 73y old  Female who presents with a chief complaint of dysphagia (15 Nov 2023 06:25)      Interval Events: Last Day of Cefepime     REVIEW OF SYSTEMS:  [x ] Positive- Daughter reporting episode of Delirium overnight   [ ] All other systems negative  [ ] Unable to assess ROS because ________    Vital Signs Last 24 Hrs  T(C): 37 (11-15-23 @ 05:00), Max: 37.8 (11-14-23 @ 12:13)  T(F): 98.6 (11-15-23 @ 05:00), Max: 100 (11-14-23 @ 12:13)  HR: 83 (11-15-23 @ 05:52) (78 - 100)  BP: 115/63 (11-15-23 @ 05:00) (100/49 - 172/88)  RR: 18 (11-15-23 @ 05:00) (18 - 18)  SpO2: 100% (11-15-23 @ 05:52) (95% - 100%)    PHYSICAL EXAM:    HEENT:   [ X ]Tracheostomy: #9 Portex Cuffed   [ X ]Pupils equal, conjunctival redness  [ ]No oral lesions  [x ]Abnormal-bilateral eye redness, no drainage    SKIN  [ X ] No Rash  [ X ] Abnormal - sacral wound  [ ] pressure    CARDIAC  [ X ]Regular  [ ]Abnormal    PULMONARY  [x ] Bilateral Clear Breath Sounds  [ ]Normal Excursion  [ X ] Abnormal - diminished BS at base L > R     GI  [X] PEG site w/ skin irritation and small tear     [ X] +BS		              [X] Soft, nondistended, nontender	  [x ]Abnormal- small bleeding noted at stoma site     MUSCULOSKELETAL                                   [X] Bedbound                 [ ]Abnormal    [ ]Ambulatory/OOB to chair                           EXTREMITIES                                         [X]Normal  [ ]Edema                           NEUROLOGIC  [X] Normal, non focal  [ ] Focal findings:    PSYCHIATRIC  [X] Alert and appropriate  [ ] Sedated	 [ ]Agitated    :  Mcbride: [ ] Yes, if yes: Date of Placement:                   [X] No    LINES: Central Lines [ ] Yes, if yes: Date of Placement                                     [X] No    HOSPITAL MEDICATIONS:  MEDICATIONS  (STANDING):  albuterol/ipratropium for Nebulization 3 milliLiter(s) Nebulizer every 6 hours  artificial  tears Solution 2 Drop(s) Both EYES four times a day  ascorbic acid 500 milliGRAM(s) Oral daily  bacitracin   Ointment 1 Application(s) Topical two times a day  calcium carbonate    500 mG (Tums) Chewable 1 Tablet(s) Chew every 12 hours  cefepime   IVPB 2000 milliGRAM(s) IV Intermittent every 8 hours  cefepime   IVPB      chlorhexidine 0.12% Liquid 15 milliLiter(s) Oral Mucosa every 12 hours  chlorhexidine 4% Liquid 1 Application(s) Topical <User Schedule>  ciprofloxacin  0.3% Ophthalmic Solution 1 Drop(s) Both EYES every 4 hours  dextrose 50% Injectable 50 milliLiter(s) IV Push once  enoxaparin Injectable 40 milliGRAM(s) SubCutaneous every 24 hours  escitalopram 10 milliGRAM(s) Oral daily  gabapentin Solution 100 milliGRAM(s) Enteral Tube at bedtime  insulin glargine Injectable (LANTUS) 14 Unit(s) SubCutaneous every morning  insulin lispro (ADMELOG) corrective regimen sliding scale   SubCutaneous every 6 hours  lactobacillus acidophilus 1 Tablet(s) Oral daily  levothyroxine 125 MICROGram(s) Oral daily  lidocaine   4% Patch 1 Patch Transdermal daily  multivitamin/minerals/iron Oral Solution (CENTRUM) 15 milliLiter(s) Oral daily  nystatin Powder 1 Application(s) Topical every 8 hours  oxybutynin 5 milliGRAM(s) Oral daily  pantoprazole   Suspension 40 milliGRAM(s) Enteral Tube daily  petrolatum Ophthalmic Ointment 1 Application(s) Both EYES four times a day  predniSONE   Tablet 5 milliGRAM(s) Oral daily  QUEtiapine 12.5 milliGRAM(s) Oral <User Schedule>  simethicone 80 milliGRAM(s) Chew four times a day  sodium chloride 3%  Inhalation 4 milliLiter(s) Inhalation every 12 hours  triamcinolone 0.1% Ointment 1 Application(s) Topical two times a day  zinc sulfate 220 milliGRAM(s) Oral daily    MEDICATIONS  (PRN):  acetaminophen   Oral Liquid .. 1000 milliGRAM(s) Enteral Tube every 6 hours PRN Temp greater or equal to 38C (100.4F), Mild Pain (1 - 3)  diphenhydrAMINE Elixir 25 milliGRAM(s) Oral every 6 hours PRN Rash and/or Itching  oxyCODONE    Solution 2.5 milliGRAM(s) Oral every 6 hours PRN Moderate Pain (4 - 6)  sodium chloride 0.65% Nasal 1 Spray(s) Both Nostrils every 6 hours PRN Nasal Congestion      LABS:                        8.4    7.05  )-----------( 121      ( 15 Nov 2023 06:40 )             28.9     11-15    136  |  100  |  43<H>  ----------------------------<  118<H>  4.3   |  28  |  <0.30<L>    Ca    9.9      15 Nov 2023 06:40  Phos  4.4     11-15  Mg     2.1     11-15        Urinalysis Basic - ( 15 Nov 2023 06:40 )    Color: x / Appearance: x / SG: x / pH: x  Gluc: 118 mg/dL / Ketone: x  / Bili: x / Urobili: x   Blood: x / Protein: x / Nitrite: x   Leuk Esterase: x / RBC: x / WBC x   Sq Epi: x / Non Sq Epi: x / Bacteria: x          CAPILLARY BLOOD GLUCOSE    MICROBIOLOGY:     RADIOLOGY:  [ ] Reviewed and interpreted by me    Mode: AC/ CMV (Assist Control/ Continuous Mandatory Ventilation)  RR (machine): 12  TV (machine): 300  FiO2: 30  PEEP: 5  ITime: 1  MAP: 7  PIP: 29

## 2023-11-15 NOTE — PROGRESS NOTE ADULT - PROBLEM SELECTOR PLAN 1
MRI confirms acute osteomyelitis of sacrum.  Sputum culture showing Pseudomonas and serratia. Antibiotics as prescribed

## 2023-11-15 NOTE — BH CONSULTATION LIAISON PROGRESS NOTE - CURRENT MEDICATION
MEDICATIONS  (STANDING):  albuterol/ipratropium for Nebulization 3 milliLiter(s) Nebulizer every 6 hours  artificial  tears Solution 2 Drop(s) Both EYES four times a day  ascorbic acid 500 milliGRAM(s) Oral daily  bacitracin   Ointment 1 Application(s) Topical two times a day  calcium carbonate    500 mG (Tums) Chewable 1 Tablet(s) Chew every 12 hours  cefepime   IVPB      cefepime   IVPB 2000 milliGRAM(s) IV Intermittent every 8 hours  chlorhexidine 0.12% Liquid 15 milliLiter(s) Oral Mucosa every 12 hours  chlorhexidine 4% Liquid 1 Application(s) Topical <User Schedule>  ciprofloxacin  0.3% Ophthalmic Solution 1 Drop(s) Both EYES every 4 hours  dextrose 50% Injectable 50 milliLiter(s) IV Push once  enoxaparin Injectable 40 milliGRAM(s) SubCutaneous every 24 hours  escitalopram 10 milliGRAM(s) Oral daily  gabapentin Solution 100 milliGRAM(s) Enteral Tube at bedtime  insulin glargine Injectable (LANTUS) 14 Unit(s) SubCutaneous every morning  insulin lispro (ADMELOG) corrective regimen sliding scale   SubCutaneous every 6 hours  lactobacillus acidophilus 1 Tablet(s) Oral daily  levothyroxine 125 MICROGram(s) Oral daily  lidocaine   4% Patch 1 Patch Transdermal daily  multivitamin/minerals/iron Oral Solution (CENTRUM) 15 milliLiter(s) Oral daily  nystatin Powder 1 Application(s) Topical every 8 hours  oxybutynin 5 milliGRAM(s) Oral daily  pantoprazole   Suspension 40 milliGRAM(s) Enteral Tube daily  petrolatum Ophthalmic Ointment 1 Application(s) Both EYES four times a day  predniSONE   Tablet 5 milliGRAM(s) Oral daily  QUEtiapine 12.5 milliGRAM(s) Oral <User Schedule>  simethicone 80 milliGRAM(s) Chew four times a day  sodium chloride 3%  Inhalation 4 milliLiter(s) Inhalation every 12 hours  triamcinolone 0.1% Ointment 1 Application(s) Topical two times a day  zinc sulfate 220 milliGRAM(s) Oral daily    MEDICATIONS  (PRN):  acetaminophen   Oral Liquid .. 1000 milliGRAM(s) Enteral Tube every 6 hours PRN Temp greater or equal to 38C (100.4F), Mild Pain (1 - 3)  diphenhydrAMINE Elixir 25 milliGRAM(s) Oral every 6 hours PRN Rash and/or Itching  oxyCODONE    Solution 2.5 milliGRAM(s) Oral every 6 hours PRN Moderate Pain (4 - 6)  sodium chloride 0.65% Nasal 1 Spray(s) Both Nostrils every 6 hours PRN Nasal Congestion  
MEDICATIONS  (STANDING):  albuterol/ipratropium for Nebulization 3 milliLiter(s) Nebulizer every 6 hours  artificial  tears Solution 2 Drop(s) Both EYES four times a day  ascorbic acid 500 milliGRAM(s) Oral daily  calcium carbonate    500 mG (Tums) Chewable 1 Tablet(s) Chew every 12 hours  cefepime   IVPB 2000 milliGRAM(s) IV Intermittent every 8 hours  cefepime   IVPB      chlorhexidine 0.12% Liquid 15 milliLiter(s) Oral Mucosa every 12 hours  chlorhexidine 4% Liquid 1 Application(s) Topical <User Schedule>  ciprofloxacin  0.3% Ophthalmic Solution 1 Drop(s) Both EYES every 4 hours  dextrose 50% Injectable 50 milliLiter(s) IV Push once  enoxaparin Injectable 40 milliGRAM(s) SubCutaneous every 24 hours  escitalopram 10 milliGRAM(s) Oral daily  gabapentin Solution 100 milliGRAM(s) Enteral Tube at bedtime  insulin glargine Injectable (LANTUS) 14 Unit(s) SubCutaneous every morning  insulin lispro (ADMELOG) corrective regimen sliding scale   SubCutaneous every 6 hours  lactobacillus acidophilus 1 Tablet(s) Oral daily  levothyroxine 125 MICROGram(s) Oral daily  lidocaine   4% Patch 1 Patch Transdermal daily  multivitamin/minerals/iron Oral Solution (CENTRUM) 15 milliLiter(s) Oral daily  nystatin Powder 1 Application(s) Topical every 8 hours  oxybutynin 5 milliGRAM(s) Oral daily  pantoprazole   Suspension 40 milliGRAM(s) Enteral Tube daily  petrolatum Ophthalmic Ointment 1 Application(s) Both EYES four times a day  predniSONE   Tablet 5 milliGRAM(s) Oral daily  QUEtiapine 12.5 milliGRAM(s) Oral <User Schedule>  simethicone 80 milliGRAM(s) Chew four times a day  sodium chloride 3%  Inhalation 4 milliLiter(s) Inhalation every 12 hours  zinc sulfate 220 milliGRAM(s) Oral daily    MEDICATIONS  (PRN):  acetaminophen   Oral Liquid .. 1000 milliGRAM(s) Enteral Tube every 6 hours PRN Temp greater or equal to 38C (100.4F), Mild Pain (1 - 3)  diphenhydrAMINE Elixir 25 milliGRAM(s) Oral every 6 hours PRN Rash and/or Itching  oxyCODONE    Solution 2.5 milliGRAM(s) Oral every 6 hours PRN Moderate Pain (4 - 6)  sodium chloride 0.65% Nasal 1 Spray(s) Both Nostrils every 6 hours PRN Nasal Congestion  
MEDICATIONS  (STANDING):  albuterol/ipratropium for Nebulization 3 milliLiter(s) Nebulizer every 6 hours  artificial  tears Solution 2 Drop(s) Both EYES four times a day  ascorbic acid 500 milliGRAM(s) Oral daily  calcium carbonate    500 mG (Tums) Chewable 1 Tablet(s) Chew every 12 hours  cefepime   IVPB      cefepime   IVPB 2000 milliGRAM(s) IV Intermittent every 8 hours  chlorhexidine 0.12% Liquid 15 milliLiter(s) Oral Mucosa every 12 hours  chlorhexidine 4% Liquid 1 Application(s) Topical <User Schedule>  ciprofloxacin  0.3% Ophthalmic Solution 1 Drop(s) Both EYES every 4 hours  dextrose 50% Injectable 50 milliLiter(s) IV Push once  enoxaparin Injectable 40 milliGRAM(s) SubCutaneous every 24 hours  escitalopram 10 milliGRAM(s) Oral daily  fentaNYL   Patch  25 MICROgram(s)/Hr 1 Patch Transdermal every 72 hours  gabapentin Solution 100 milliGRAM(s) Enteral Tube at bedtime  insulin glargine Injectable (LANTUS) 14 Unit(s) SubCutaneous every morning  insulin lispro (ADMELOG) corrective regimen sliding scale   SubCutaneous every 6 hours  lactobacillus acidophilus 1 Tablet(s) Oral daily  levothyroxine 125 MICROGram(s) Oral daily  lidocaine   4% Patch 1 Patch Transdermal daily  multivitamin/minerals/iron Oral Solution (CENTRUM) 15 milliLiter(s) Oral daily  nystatin Powder 1 Application(s) Topical every 8 hours  oxybutynin 5 milliGRAM(s) Oral daily  pantoprazole   Suspension 40 milliGRAM(s) Enteral Tube daily  petrolatum Ophthalmic Ointment 1 Application(s) Both EYES four times a day  polyethylene glycol 3350 17 Gram(s) Oral two times a day  predniSONE   Tablet 5 milliGRAM(s) Oral daily  QUEtiapine 12.5 milliGRAM(s) Oral <User Schedule>  simethicone 80 milliGRAM(s) Chew four times a day  sodium chloride 3%  Inhalation 4 milliLiter(s) Inhalation every 12 hours  zinc sulfate 220 milliGRAM(s) Oral daily    MEDICATIONS  (PRN):  acetaminophen   Oral Liquid .. 1000 milliGRAM(s) Enteral Tube every 6 hours PRN Temp greater or equal to 38C (100.4F), Mild Pain (1 - 3)  diphenhydrAMINE Elixir 25 milliGRAM(s) Oral every 6 hours PRN Rash and/or Itching  oxyCODONE    Solution 2.5 milliGRAM(s) Oral every 6 hours PRN Moderate Pain (4 - 6)  sodium chloride 0.65% Nasal 1 Spray(s) Both Nostrils every 6 hours PRN Nasal Congestion  
MEDICATIONS  (STANDING):  artificial  tears Solution 2 Drop(s) Both EYES every 6 hours  ascorbic acid 500 milliGRAM(s) Oral daily  calcium carbonate    500 mG (Tums) Chewable 1 Tablet(s) Chew every 12 hours  cefepime   IVPB      cefepime   IVPB 2000 milliGRAM(s) IV Intermittent every 8 hours  chlorhexidine 0.12% Liquid 15 milliLiter(s) Oral Mucosa every 12 hours  chlorhexidine 4% Liquid 1 Application(s) Topical <User Schedule>  dextrose 50% Injectable 50 milliLiter(s) IV Push once  enoxaparin Injectable 40 milliGRAM(s) SubCutaneous every 24 hours  escitalopram 10 milliGRAM(s) Oral daily  fentaNYL   Patch  25 MICROgram(s)/Hr 1 Patch Transdermal every 72 hours  gabapentin Solution 100 milliGRAM(s) Enteral Tube at bedtime  insulin glargine Injectable (LANTUS) 14 Unit(s) SubCutaneous every morning  insulin lispro (ADMELOG) corrective regimen sliding scale   SubCutaneous every 6 hours  lactobacillus acidophilus 1 Tablet(s) Oral daily  levothyroxine 100 MICROGram(s) Enteral Tube <User Schedule>  lidocaine   4% Patch 1 Patch Transdermal daily  multivitamin/minerals/iron Oral Solution (CENTRUM) 15 milliLiter(s) Oral daily  nystatin Powder 1 Application(s) Topical every 8 hours  oxybutynin 5 milliGRAM(s) Oral daily  pantoprazole   Suspension 40 milliGRAM(s) Enteral Tube daily  polyethylene glycol 3350 17 Gram(s) Oral two times a day  predniSONE   Tablet 5 milliGRAM(s) Oral daily  QUEtiapine 12.5 milliGRAM(s) Oral <User Schedule>  simethicone 80 milliGRAM(s) Chew four times a day  sodium chloride 0.65% Nasal 1 Spray(s) Both Nostrils three times a day  zinc sulfate 220 milliGRAM(s) Oral daily    MEDICATIONS  (PRN):  acetaminophen   Oral Liquid .. 1000 milliGRAM(s) Enteral Tube every 6 hours PRN Temp greater or equal to 38C (100.4F), Mild Pain (1 - 3)  albuterol/ipratropium for Nebulization 3 milliLiter(s) Nebulizer every 6 hours PRN Bronchospasm  diphenhydrAMINE Elixir 25 milliGRAM(s) Oral every 6 hours PRN Rash and/or Itching  oxyCODONE    Solution 2.5 milliGRAM(s) Oral every 6 hours PRN Moderate Pain (4 - 6)

## 2023-11-15 NOTE — BH CONSULTATION LIAISON PROGRESS NOTE - NSBHCHARTREVIEWLAB_PSY_A_CORE FT
9.4    6.78  )-----------( 125      ( 14 Nov 2023 12:14 )             31.8     11-14    135  |  98  |  22  ----------------------------<  245<H>  4.2   |  27  |  <0.30<L>    Ca    9.4      14 Nov 2023 12:14  Phos  3.5     11-14  Mg     1.9     11-14    TPro  7.0  /  Alb  3.1<L>  /  TBili  0.3  /  DBili  x   /  AST  21  /  ALT  22  /  AlkPhos  43  11-13    Urinalysis Basic - ( 14 Nov 2023 12:14 )    Color: x / Appearance: x / SG: x / pH: x  Gluc: 245 mg/dL / Ketone: x  / Bili: x / Urobili: x   Blood: x / Protein: x / Nitrite: x   Leuk Esterase: x / RBC: x / WBC x   Sq Epi: x / Non Sq Epi: x / Bacteria: x    
                             8.6    6.74  )-----------( 121      ( 09 Nov 2023 06:58 )             30.0     11-09    135  |  101  |  21  ----------------------------<  133<H>  4.0   |  25  |  <0.30<L>    Ca    9.4      09 Nov 2023 06:58  Phos  3.6     11-09  Mg     1.9     11-09      Urinalysis Basic - ( 09 Nov 2023 06:58 )    Color: x / Appearance: x / SG: x / pH: x  Gluc: 133 mg/dL / Ketone: x  / Bili: x / Urobili: x   Blood: x / Protein: x / Nitrite: x   Leuk Esterase: x / RBC: x / WBC x   Sq Epi: x / Non Sq Epi: x / Bacteria: x    
                             9.4    6.78  )-----------( 125      ( 14 Nov 2023 12:14 )             31.8     11-14    135  |  98  |  22  ----------------------------<  245<H>  4.2   |  27  |  <0.30<L>    Ca    9.4      14 Nov 2023 12:14  Phos  3.5     11-14  Mg     1.9     11-14    TPro  7.0  /  Alb  3.1<L>  /  TBili  0.3  /  DBili  x   /  AST  21  /  ALT  22  /  AlkPhos  43  11-13    Urinalysis Basic - ( 14 Nov 2023 12:14 )    Color: x / Appearance: x / SG: x / pH: x  Gluc: 245 mg/dL / Ketone: x  / Bili: x / Urobili: x   Blood: x / Protein: x / Nitrite: x   Leuk Esterase: x / RBC: x / WBC x   Sq Epi: x / Non Sq Epi: x / Bacteria: x    
                             8.6    6.74  )-----------( 121      ( 09 Nov 2023 06:58 )             30.0     11-09    135  |  101  |  21  ----------------------------<  133<H>  4.0   |  25  |  <0.30<L>    Ca    9.4      09 Nov 2023 06:58  Phos  3.6     11-09  Mg     1.9     11-09      Urinalysis Basic - ( 09 Nov 2023 06:58 )    Color: x / Appearance: x / SG: x / pH: x  Gluc: 133 mg/dL / Ketone: x  / Bili: x / Urobili: x   Blood: x / Protein: x / Nitrite: x   Leuk Esterase: x / RBC: x / WBC x   Sq Epi: x / Non Sq Epi: x / Bacteria: x

## 2023-11-15 NOTE — BH CONSULTATION LIAISON PROGRESS NOTE - NSBHFUPINTERVALHXFT_PSY_A_CORE
Patient seen and evaluated, staff consulted, chart, labs, meds reviewed. Patient and staff report no issues overnight. Patient demonstrates mild improvements in thinking and perception. No SI/HI, patient's perceptual disturbances are less prominent, but still present, no prominent mood sx. noted or reported. Patient is oriented to self, location, time. She has no current complaints of hallucinations.     
Patient seen and evaluated, staff consulted, chart, labs, meds reviewed. Patient and staff report no issues overnight. Patient demonstrates mild improvements in thinking and perception. No SI/HI, patient's perceptual disturbances are less prominent, but still present, no prominent mood sx. noted or reported. Reality testing improving. Patient is oriented to self, location, time.     Direction of care discussed with the family. Counseling and support provided.  Patient did not represent a management problem overnight.
Patient seen and evaluated, staff consulted, chart, labs, meds reviewed. Patient and staff report no issues overnight. Patient demonstrates mild improvements in thinking and perception. No SI/HI, patient's perceptual disturbances are less prominent, but still present, no prominent mood sx. noted or reported. Patient is oriented to self, location, time. She has no current complaints of hallucinations.    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     
Patient seen and evaluated, staff consulted, chart, labs, meds reviewed. Patient and staff report no issues overnight. Patient demonstrates mild improvements in thinking and perception. No SI/HI, patient's perceptual disturbances are less prominent, but still present, no prominent mood sx. noted or reported. Patient is oriented to self, location, time. She has no current complaints of hallucinations.    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

## 2023-11-15 NOTE — BH CONSULTATION LIAISON PROGRESS NOTE - NSBHCHARTREVIEWINVESTIGATE_PSY_A_CORE FT
Ventricular Rate 74 BPM    Atrial Rate 74 BPM    P-R Interval 134 ms    QRS Duration 62 ms    Q-T Interval 400 ms    QTC Calculation(Bazett) 444 ms    P Axis 11 degrees    R Axis -29 degrees    T Axis 40 degrees    Diagnosis Line NORMAL SINUS RHYTHM  MINIMAL VOLTAGE CRITERIA FOR LVH, MAY BE NORMAL VARIANT ( R in aVL )  CANNOT RULE OUT ANTERIOR INFARCT , AGE UNDETERMINED  ABNORMAL ECG  WHEN COMPARED WITH ECG OF 15-OCT-2020 12:38,  SIGNIFICANT CHANGES HAVE OCCURRED  Confirmed by MD Meek, [facility] ([id number]) on 10/28/2023 10:54:02 PM
  Ventricular Rate 74 BPM    Atrial Rate 74 BPM    P-R Interval 134 ms    QRS Duration 62 ms    Q-T Interval 400 ms    QTC Calculation(Bazett) 444 ms    P Axis 11 degrees    R Axis -29 degrees    T Axis 40 degrees    Diagnosis Line NORMAL SINUS RHYTHM  MINIMAL VOLTAGE CRITERIA FOR LVH, MAY BE NORMAL VARIANT ( R in aVL )  CANNOT RULE OUT ANTERIOR INFARCT , AGE UNDETERMINED  ABNORMAL ECG  WHEN COMPARED WITH ECG OF 15-OCT-2020 12:38,  SIGNIFICANT CHANGES HAVE OCCURRED  Confirmed by MD Meek, [facility] ([id number]) on 10/28/2023 10:54:02 PM
  Ventricular Rate 74 BPM    Atrial Rate 74 BPM    P-R Interval 134 ms    QRS Duration 62 ms    Q-T Interval 400 ms    QTC Calculation(Bazett) 444 ms    P Axis 11 degrees    R Axis -29 degrees    T Axis 40 degrees    Diagnosis Line NORMAL SINUS RHYTHM  MINIMAL VOLTAGE CRITERIA FOR LVH, MAY BE NORMAL VARIANT ( R in aVL )  CANNOT RULE OUT ANTERIOR INFARCT , AGE UNDETERMINED  ABNORMAL ECG  WHEN COMPARED WITH ECG OF 15-OCT-2020 12:38,  SIGNIFICANT CHANGES HAVE OCCURRED  Confirmed by MD Meek, [facility] ([id number]) on 10/28/2023 10:54:02 PM
  Ventricular Rate 74 BPM    Atrial Rate 74 BPM    P-R Interval 134 ms    QRS Duration 62 ms    Q-T Interval 400 ms    QTC Calculation(Bazett) 444 ms    P Axis 11 degrees    R Axis -29 degrees    T Axis 40 degrees    Diagnosis Line NORMAL SINUS RHYTHM  MINIMAL VOLTAGE CRITERIA FOR LVH, MAY BE NORMAL VARIANT ( R in aVL )  CANNOT RULE OUT ANTERIOR INFARCT , AGE UNDETERMINED  ABNORMAL ECG  WHEN COMPARED WITH ECG OF 15-OCT-2020 12:38,  SIGNIFICANT CHANGES HAVE OCCURRED  Confirmed by MD Meek, [facility] ([id number]) on 10/28/2023 10:54:02 PM

## 2023-11-15 NOTE — BH CONSULTATION LIAISON PROGRESS NOTE - NSBHATTESTBILLING_PSY_A_CORE
Orthopedics
Billing in another system

## 2023-11-15 NOTE — PROGRESS NOTE ADULT - ASSESSMENT
74 y/o F with PMHx of T2DM, HTN, HLD, anemia, hypothyroidism, RA, fibromyalgia, remote cerebral aneurysm repair, acute hypercapnic respiratory failure now with tracheostomy, PEG tube, and bedbound (trach change 8/18, PEG change 8/14), sacral pressure ulcer, BIBEMS from home for 6 day hx of bleeding from the tracheostomy and PEG tube with associated abdominal pain, recent history of auditory and visual hallucinations, and with chronic sacral decubitus ulcer. Exam notable for mild abdominal distention with LLQ and RLQ tenderness, tracheostomy in place with no obvious bleeding, PEG tube with scant amount of dried blood, at baseline neurologic status. Imaging showing no active bleeding around tracheostomy site, however was notable for mild thickening along PEG tube tract, sacral ulcer extending to the coccyx suggestive of possible osteomyelitis, and bibasilar patchy lung opacities with volume loss. Patient admitted for respiratory support, wound care of tracheostomy and PEG, and management of sacral osteomyelitis. No further Trach and peg site  bleeding noted since admission.  Pelvic MRI imaging also suggestive of Acute OM. Patient placed on long-term antibiotics with Infectious disease guidance.  Additionally treated with a 7d course of Cefepime due to PSA and Serratia tracheitis. Once completed,  plan to change abx back to Cipro 500mg q 12 and Keflex 500mg q 6 to complete a total of 6 weeks. Hospital course c/b Delirium for which Seroquel was added and home Lexapro continued. Tracheostomy changed to #9 Cuffed Portex by ENT 11/3.     11/14: Day 6/7 of Cefepime, had multiple loose stool overnight, Miralax stopped for now, Thrombocytopenia improved today. Will resume oral abx for completion of OM treatment with Cipro 500mg q 12 and Keflex 500mg q 6 until 12/10.           74 y/o F with PMHx of T2DM, HTN, HLD, anemia, hypothyroidism, RA, fibromyalgia, remote cerebral aneurysm repair, acute hypercapnic respiratory failure now with tracheostomy, PEG tube, and bedbound (trach change 8/18, PEG change 8/14), sacral pressure ulcer, BIBEMS from home for 6 day hx of bleeding from the tracheostomy and PEG tube with associated abdominal pain, recent history of auditory and visual hallucinations, and with chronic sacral decubitus ulcer. Exam notable for mild abdominal distention with LLQ and RLQ tenderness, tracheostomy in place with no obvious bleeding, PEG tube with scant amount of dried blood, at baseline neurologic status. Imaging showing no active bleeding around tracheostomy site, however was notable for mild thickening along PEG tube tract, sacral ulcer extending to the coccyx suggestive of possible osteomyelitis, and bibasilar patchy lung opacities with volume loss. Patient admitted for respiratory support, wound care of tracheostomy and PEG, and management of sacral osteomyelitis. No further Trach and peg site  bleeding noted since admission.  Pelvic MRI imaging also suggestive of Acute OM. Patient placed on long-term antibiotics with Infectious disease guidance.  Additionally treated with a 7d course of Cefepime due to PSA and Serratia tracheitis. Once completed,  plan to change abx back to Cipro 500mg q 12 and Keflex 500mg q 6 to complete a total of 6 weeks. Hospital course c/b Delirium for which Seroquel was added and home Lexapro continued. Tracheostomy changed to #9 Cuffed Portex by ENT 11/3.     11/1: Last Day of Cefepime, Thrombocytopenia stable. Resume oral abx tomorrow (11/16) for completion of OM treatment with Cipro 500mg q 12 and Keflex 500mg q 6 until 12/10.           74 y/o F with PMHx of T2DM, HTN, HLD, anemia, hypothyroidism, RA, fibromyalgia, remote cerebral aneurysm repair, acute hypercapnic respiratory failure now with tracheostomy, PEG tube, and bedbound (trach change 8/18, PEG change 8/14), sacral pressure ulcer, BIBEMS from home for 6 day hx of bleeding from the tracheostomy and PEG tube with associated abdominal pain, recent history of auditory and visual hallucinations, and with chronic sacral decubitus ulcer. Exam notable for mild abdominal distention with LLQ and RLQ tenderness, tracheostomy in place with no obvious bleeding, PEG tube with scant amount of dried blood, at baseline neurologic status. Imaging showing no active bleeding around tracheostomy site, however was notable for mild thickening along PEG tube tract, sacral ulcer extending to the coccyx suggestive of possible osteomyelitis, and bibasilar patchy lung opacities with volume loss. Patient admitted for respiratory support, wound care of tracheostomy and PEG, and management of sacral osteomyelitis. No further Trach and peg site  bleeding noted since admission.  Pelvic MRI imaging also suggestive of Acute OM. Patient placed on long-term antibiotics with Infectious disease guidance.  Additionally treated with a 7d course of Cefepime due to PSA and Serratia tracheitis. Once completed,  plan to change abx back to Cipro 500mg q 12 and Keflex 500mg q 6 to complete a total of 6 weeks. Hospital course c/b Delirium for which Seroquel was added and home Lexapro continued. Tracheostomy changed to #9 Cuffed Portex by ENT 11/3.     11/1: Last Day of Cefepime, Thrombocytopenia stable. Resume oral abx tomorrow (11/16) for completion of OM treatment with Cipro 500mg q 12 and Keflex 500mg q 6 until 12/10. Small bleeding noted at stoma site, h/h remains stable, GI ask to see if any intervention.

## 2023-11-15 NOTE — BH CONSULTATION LIAISON PROGRESS NOTE - NSBHASSESSMENTFT_PSY_ALL_CORE
This is a 73-y.o. female patient with an extensive PMHx, PPhx of auditory and visual hallucinations, anxiety, who is admitted for respiratory support, wound care of tracheostomy and PEG, and management of presumed sacral osteomyelitis. Psychiatry was consulted for evaluation of auditory and visual hallucinations. Pt's daughter noted patient was recently experiencing auditory and visual hallucinations (seeing animals that were not there & hearing a "bomb" going off outside her home), though patient denies any hallucinations today when evaluated.  Pt was prescribed Seroquel 12.5mg for sleep and Lexapro 10mg in the AM for anxiety by a psychiatrist at [facility] in March.      On evaluation, pt. conceded to some experience and Psychiatry recommended continuing the Seroquel 12.5mg at bedtime to help with any further symptoms of delirium and sleep. As her perceptual disturbances continue, consider increasing Seroquel to 25mg p.o. at bedtime (continue Lexapro).  
This is a 73-y.o. female patient with an extensive PMHx, PPhx of auditory and visual hallucinations, anxiety, who is admitted for respiratory support, wound care of tracheostomy and PEG, and management of presumed sacral osteomyelitis. Psychiatry was consulted for evaluation of auditory and visual hallucinations. Pt's daughter noted patient was recently experiencing auditory and visual hallucinations (seeing animals that were not there & hearing a "bomb" going off outside her home), though patient denies any hallucinations today when evaluated.  Pt was prescribed Seroquel 12.5mg for sleep and Lexapro 10mg in the AM for anxiety by a psychiatrist at [facility] in March.    Discussed recommendations with daughter who agreed with Seroquel 12.5mg at 3pm and at 8pm for paranoia and hallucinations.   
This is a 73-y.o. female patient with an extensive PMHx, PPhx of auditory and visual hallucinations, anxiety, who is admitted for respiratory support, wound care of tracheostomy and PEG, and management of presumed sacral osteomyelitis. Psychiatry was consulted for evaluation of auditory and visual hallucinations. Pt's daughter noted patient was recently experiencing auditory and visual hallucinations (seeing animals that were not there & hearing a "bomb" going off outside her home), though patient denies any hallucinations today when evaluated.  Pt was prescribed Seroquel 12.5mg for sleep and Lexapro 10mg in the AM for anxiety by a psychiatrist at [facility] in March.      On evaluation, pt. conceded to some experience and Psychiatry recommended continuing the Seroquel 12.5mg at bedtime to help with any further symptoms of delirium and sleep. As her perceptual disturbances continue, consider increasing Seroquel to 25mg p.o. at bedtime (continue Lexapro).  
This is a 73-y.o. female patient with an extensive PMHx, PPhx of auditory and visual hallucinations, anxiety, who is admitted for respiratory support, wound care of tracheostomy and PEG, and management of presumed sacral osteomyelitis. Psychiatry was consulted for evaluation of auditory and visual hallucinations. Pt's daughter noted patient was recently experiencing auditory and visual hallucinations (seeing animals that were not there & hearing a "bomb" going off outside her home), though patient denies any hallucinations today when evaluated.  Pt was prescribed Seroquel 12.5mg for sleep and Lexapro 10mg in the AM for anxiety by a psychiatrist at [facility] in March.      On evaluation, pt. conceded to some experience and Psychiatry recommended continuing the Seroquel 12.5mg at bedtime to help with any further symptoms of delirium and sleep. As her perceptual disturbances continue, consider increasing Seroquel to 25mg or even 50mg p.o. before bedtime (continue Lexapro).

## 2023-11-15 NOTE — PROGRESS NOTE ADULT - SUBJECTIVE AND OBJECTIVE BOX
Patient is a 73y old  Female who presents with a chief complaint of 72 y/o F with PMHx of T2DM, HTN, HLD, anemia, hypothyroidism, RA, fibromyalgia, remote cerebral aneurysm repair, acute hypercapnic respiratory failure now with tracheostomy, PEG tube, and bedbound (trach change , PEG change ), sacral pressure ulcer, BIBEMS from home for 6 day hx of bleeding from the tracheostomy and PEG tube with associated abdominal pain, recent history of auditory and visual hallucinations, and with chronic sacral decubitus ulcer.   11/15/2023      SUBJECTIVE / OVERNIGHT EVENTS: Afebrile, awake and alert  No new symptoms    MEDICATIONS  (STANDING):  albuterol/ipratropium for Nebulization 3 milliLiter(s) Nebulizer every 6 hours  artificial  tears Solution 2 Drop(s) Both EYES four times a day  ascorbic acid 500 milliGRAM(s) Oral daily  calcium carbonate    500 mG (Tums) Chewable 1 Tablet(s) Chew every 12 hours  cefepime   IVPB      cefepime   IVPB 2000 milliGRAM(s) IV Intermittent every 8 hours  chlorhexidine 0.12% Liquid 15 milliLiter(s) Oral Mucosa every 12 hours  chlorhexidine 4% Liquid 1 Application(s) Topical <User Schedule>  dextrose 50% Injectable 50 milliLiter(s) IV Push once  enoxaparin Injectable 40 milliGRAM(s) SubCutaneous every 24 hours  escitalopram 10 milliGRAM(s) Oral daily  fentaNYL   Patch  25 MICROgram(s)/Hr 1 Patch Transdermal every 72 hours  gabapentin Solution 100 milliGRAM(s) Enteral Tube at bedtime  insulin glargine Injectable (LANTUS) 14 Unit(s) SubCutaneous every morning  insulin lispro (ADMELOG) corrective regimen sliding scale   SubCutaneous every 6 hours  lactobacillus acidophilus 1 Tablet(s) Oral daily  levothyroxine 125 MICROGram(s) Oral daily  lidocaine   4% Patch 1 Patch Transdermal daily  multivitamin/minerals/iron Oral Solution (CENTRUM) 15 milliLiter(s) Oral daily  nystatin Powder 1 Application(s) Topical every 8 hours  oxybutynin 5 milliGRAM(s) Oral daily  pantoprazole   Suspension 40 milliGRAM(s) Enteral Tube daily  petrolatum Ophthalmic Ointment 1 Application(s) Both EYES four times a day  polyethylene glycol 3350 17 Gram(s) Oral two times a day  predniSONE   Tablet 5 milliGRAM(s) Oral daily  QUEtiapine 12.5 milliGRAM(s) Oral <User Schedule>  simethicone 80 milliGRAM(s) Chew four times a day  sodium chloride 3%  Inhalation 4 milliLiter(s) Inhalation every 12 hours  zinc sulfate 220 milliGRAM(s) Oral daily    MEDICATIONS  (PRN):  acetaminophen   Oral Liquid .. 1000 milliGRAM(s) Enteral Tube every 6 hours PRN Temp greater or equal to 38C (100.4F), Mild Pain (1 - 3)  diphenhydrAMINE Elixir 25 milliGRAM(s) Oral every 6 hours PRN Rash and/or Itching  oxyCODONE    Solution 2.5 milliGRAM(s) Oral every 6 hours PRN Moderate Pain (4 - 6)  sodium chloride 0.65% Nasal 1 Spray(s) Both Nostrils every 6 hours PRN Nasal Congestion        Telemetry:   CAPILLARY BLOOD GLUCOSE      POCT Blood Glucose.: 256 mg/dL (15 Nov 2023 11:53)  POCT Blood Glucose.: 116 mg/dL (15 Nov 2023 05:46)  POCT Blood Glucose.: 220 mg/dL (15 Nov 2023 00:33)  POCT Blood Glucose.: 183 mg/dL (2023 17:38)    I&O's Summary    2023 07:01  -  2023 07:00  --------------------------------------------------------  IN: 1440 mL / OUT: 1150 mL / NET: 290 mL        PHYSICAL EXAM:    Vital Signs Last 24 Hrs  T(C): 37 (15 Nov 2023 05:00), Max: 37.7 (2023 19:52)  T(F): 98.6 (15 Nov 2023 05:00), Max: 99.9 (2023 19:52)  HR: 76 (15 Nov 2023 11:47) (76 - 97)  BP: 115/63 (15 Nov 2023 05:00) (100/49 - 152/70)  BP(mean): --  RR: 20 (15 Nov 2023 09:03) (18 - 20)  SpO2: 100% (15 Nov 2023 11:47) (100% - 100%)    Parameters below as of 15 Nov 2023 11:47  Patient On (Oxygen Delivery Method): ventilator        GENERAL: NAD, well-developed  HEAD:  Atraumatic, Normocephalic  EYES: EOMI, PERRLA, conjunctiva and sclera clear  NECK: Supple, No JVD. Trach in place, connected to mech vent   CHEST/LUNG: Clear to auscultation bilaterally; No wheeze  HEART: Regular rate and rhythm; No murmurs, rubs, or gallops  ABDOMEN: Soft, Nontender, Nondistended; Bowel sounds present  EXTREMITIES:  2+ Peripheral Pulses, No clubbing, cyanosis, or edema  PSYCH: AAOx3  NEUROLOGY: non-focal  SKIN: No rashes or lesions    LABS:                        9.2    5.84  )-----------( 131      ( 2023 06:35 )             31.3         137  |  102  |  27<H>  ----------------------------<  135<H>  4.1   |  26  |  <0.30<L>    Ca    9.2      2023 06:37  Phos  4.0       Mg     1.8         TPro  7.0  /  Alb  3.1<L>  /  TBili  0.4  /  DBili  x   /  AST  27  /  ALT  27  /  AlkPhos  47  11-11          Urinalysis Basic - ( 2023 10:34 )    Color: Yellow / Appearance: Clear / S.018 / pH: x  Gluc: x / Ketone: Negative mg/dL  / Bili: Negative / Urobili: 0.2 mg/dL   Blood: x / Protein: 30 mg/dL / Nitrite: Negative   Leuk Esterase: Negative / RBC: 4 /HPF / WBC 0 /HPF   Sq Epi: x / Non Sq Epi: 1 /HPF / Bacteria: Negative /HPF        RADIOLOGY & ADDITIONAL TESTS:    Imaging Personally Reviewed:    Consultant(s) Notes Reviewed:      Care Discussed with Consultants/Other Providers:

## 2023-11-15 NOTE — PROGRESS NOTE ADULT - PROBLEM SELECTOR PLAN 3
Chronic Trach/ Vent dependant 2/2 Hypercapnic Resp Failure since 10/2022   -S/p Trach # 8 Cuffed Flex Shiley; trach change 8/18, PEG change 8/14 per records   - Continue Home vent settings: (300 TV/ RR 12/5 Peep/ Fio2 30%)  - Tolerates intermittent PSV 15/5 during day/ Vent HS  - Airway Clearance: Duoneb, Hypersal, Chest PT, PRN suctioning   - Maintain 02 sat > 92%  Tracheal Bleeding (Intermittent)-->resolved since admission   -S/p CT Angio neck: No evidence of active bleeding around tracheostomy site. No hematoma.  No collection   - Advise RN staff to use Red rubber trach catheters to avoid suction trauma   - Seen by ENT; Kath and b/l bronchi visualized without any trauma. small amount of blood tinged secretions resting at beginning of right bronchus not actively bleeding.  - 11/3 trach changed to #9 Portex by ENT

## 2023-11-15 NOTE — BH CONSULTATION LIAISON PROGRESS NOTE - OTHER
Pt had trach in place, labored speech

## 2023-11-15 NOTE — PROGRESS NOTE ADULT - SUBJECTIVE AND OBJECTIVE BOX
Patient is a 73y Female     Patient is a 73y old  Female who presents with a chief complaint of leaking peg (14 Nov 2023 06:52)      HPI:  72 y/o F with PMHx of T2DM, HTN, HLD, anemia, hypothyroidism, RA, fibromyalgia, remote cerebral aneurysm repair, acute hypercapnic respiratory failure now with tracheostomy, PEG tube, and bedbound (trach change 8/18, PEG change 8/14), sacral pressure ulcer, BIBEMS from home for 6 day hx of bleeding from the tracheostomy and PEG tube with associated abdominal pain. Patient still having regular bowel movements, last one occurred prior to ED arrival. Was seen outpatient on 10/26 by critical care Dr. Zaki Gottlieb and apparently had cultures sent at that time. Patient also noted to have chronic sacral decubitous ulcer. Family endorsed one episode of fever, though was not febrile on evaluation. Daughter noted patient was recently experiencing auditory and visual hallucinations (seeing animals that were not there & hearing a "bomb" going off outside her home), though patient not actively hallucinating on evaluation.    ED course notable for CT neck imaging showing no evidence of active bleeding around the tracheostomy site, no hematomas, and no drainable collection around the tracheostomy site. CT abdomen/pelvis notable for gastrostomy tube localized to the stomach with mild thickening noted along the tract; a sacral decubitus ulcer extending to the coccyx with osseous remodeling, possibly representing osteomyelitis; and bibasilar patchy opacities with volume loss in the lungs, representing atelectasis vs infection. Patient admitted for respiratory support, wound care of tracheostomy and PEG, and management of presumed sacral osteomyelitis.   (28 Oct 2023 04:18)      PAST MEDICAL & SURGICAL HISTORY:  Diabetes      Rheumatoid arthritis      Fibromyalgia      Hypothyroid      Hypertension      H/O tracheostomy      PEG (percutaneous endoscopic gastrostomy) status          MEDICATIONS  (STANDING):  albuterol/ipratropium for Nebulization 3 milliLiter(s) Nebulizer every 6 hours  artificial  tears Solution 2 Drop(s) Both EYES four times a day  ascorbic acid 500 milliGRAM(s) Oral daily  bacitracin   Ointment 1 Application(s) Topical two times a day  calcium carbonate    500 mG (Tums) Chewable 1 Tablet(s) Chew every 12 hours  cefepime   IVPB      cefepime   IVPB 2000 milliGRAM(s) IV Intermittent every 8 hours  chlorhexidine 0.12% Liquid 15 milliLiter(s) Oral Mucosa every 12 hours  chlorhexidine 4% Liquid 1 Application(s) Topical <User Schedule>  ciprofloxacin  0.3% Ophthalmic Solution 1 Drop(s) Both EYES every 4 hours  dextrose 50% Injectable 50 milliLiter(s) IV Push once  enoxaparin Injectable 40 milliGRAM(s) SubCutaneous every 24 hours  escitalopram 10 milliGRAM(s) Oral daily  gabapentin Solution 100 milliGRAM(s) Enteral Tube at bedtime  insulin glargine Injectable (LANTUS) 14 Unit(s) SubCutaneous every morning  insulin lispro (ADMELOG) corrective regimen sliding scale   SubCutaneous every 6 hours  lactobacillus acidophilus 1 Tablet(s) Oral daily  levothyroxine 125 MICROGram(s) Oral daily  lidocaine   4% Patch 1 Patch Transdermal daily  multivitamin/minerals/iron Oral Solution (CENTRUM) 15 milliLiter(s) Oral daily  nystatin Powder 1 Application(s) Topical every 8 hours  oxybutynin 5 milliGRAM(s) Oral daily  pantoprazole   Suspension 40 milliGRAM(s) Enteral Tube daily  petrolatum Ophthalmic Ointment 1 Application(s) Both EYES four times a day  predniSONE   Tablet 5 milliGRAM(s) Oral daily  QUEtiapine 12.5 milliGRAM(s) Oral <User Schedule>  simethicone 80 milliGRAM(s) Chew four times a day  sodium chloride 3%  Inhalation 4 milliLiter(s) Inhalation every 12 hours  zinc sulfate 220 milliGRAM(s) Oral daily      Allergies    penicillin (Unknown)  heparin (Unknown)  Tagamet (Unknown)  pineapple (Unknown)  walnut (Unknown)  Pecan, Filbert, Hazelnut (Unknown)  Lyrica (Unknown)    Intolerances        SOCIAL HISTORY:  Denies ETOh,Smoking,     FAMILY HISTORY:      REVIEW OF SYSTEMS:    CONSTITUTIONAL: No weakness, fevers or chills  EYES/ENT: No visual changes;  No vertigo or throat pain   NECK: No pain or stiffness  RESPIRATORY: No cough, wheezing, hemoptysis; No shortness of breath  CARDIOVASCULAR: No chest pain or palpitations  GASTROINTESTINAL: No abdominal or epigastric pain. No nausea, vomiting, or hematemesis; No diarrhea or constipation. No melena or hematochezia.  GENITOURINARY: No dysuria, frequency or hematuria  NEUROLOGICAL: No numbness or weakness  SKIN: No itching, burning, rashes, or lesions   All other review of systems is negative unless indicated above.    VITAL:  T(C): , Max: 37.8 (11-14-23 @ 12:13)  T(F): , Max: 100 (11-14-23 @ 12:13)  HR: 83 (11-15-23 @ 05:52)  BP: 100/49 (11-14-23 @ 19:52)  BP(mean): --  RR: 18 (11-14-23 @ 19:52)  SpO2: 100% (11-15-23 @ 05:52)  Wt(kg): --    I and O's:    11-12 @ 07:01  -  11-13 @ 07:00  --------------------------------------------------------  IN: 1370 mL / OUT: 750 mL / NET: 620 mL    11-13 @ 07:01  -  11-14 @ 07:00  --------------------------------------------------------  IN: 1300 mL / OUT: 1650 mL / NET: -350 mL    11-14 @ 07:01  -  11-15 @ 06:25  --------------------------------------------------------  IN: 800 mL / OUT: 300 mL / NET: 500 mL          PHYSICAL EXAM:    Constitutional: NAD  HEENT: PERRLA,   Neck: No JVD  Respiratory: CTA B/L  Cardiovascular: S1 and S2  Gastrointestinal: BS+, soft, NT/ND  Extremities: No peripheral edema  Neurological: A/O x 3, no focal deficits  Psychiatric: Normal mood, normal affect  : No Mcbride  Skin: No rashes  Access: Not applicable  Back: No CVA tenderness    LABS:                        9.4    6.78  )-----------( 125      ( 14 Nov 2023 12:14 )             31.8     11-14    135  |  98  |  22  ----------------------------<  245<H>  4.2   |  27  |  <0.30<L>    Ca    9.4      14 Nov 2023 12:14  Phos  3.5     11-14  Mg     1.9     11-14    TPro  7.0  /  Alb  3.1<L>  /  TBili  0.3  /  DBili  x   /  AST  21  /  ALT  22  /  AlkPhos  43  11-13          RADIOLOGY & ADDITIONAL STUDIES:

## 2023-11-16 LAB
ANION GAP SERPL CALC-SCNC: 9 MMOL/L — SIGNIFICANT CHANGE UP (ref 5–17)
ANION GAP SERPL CALC-SCNC: 9 MMOL/L — SIGNIFICANT CHANGE UP (ref 5–17)
BUN SERPL-MCNC: 37 MG/DL — HIGH (ref 7–23)
BUN SERPL-MCNC: 37 MG/DL — HIGH (ref 7–23)
CALCIUM SERPL-MCNC: 9.8 MG/DL — SIGNIFICANT CHANGE UP (ref 8.4–10.5)
CALCIUM SERPL-MCNC: 9.8 MG/DL — SIGNIFICANT CHANGE UP (ref 8.4–10.5)
CHLORIDE SERPL-SCNC: 100 MMOL/L — SIGNIFICANT CHANGE UP (ref 96–108)
CHLORIDE SERPL-SCNC: 100 MMOL/L — SIGNIFICANT CHANGE UP (ref 96–108)
CO2 SERPL-SCNC: 28 MMOL/L — SIGNIFICANT CHANGE UP (ref 22–31)
CO2 SERPL-SCNC: 28 MMOL/L — SIGNIFICANT CHANGE UP (ref 22–31)
CREAT SERPL-MCNC: <0.3 MG/DL — LOW (ref 0.5–1.3)
CREAT SERPL-MCNC: <0.3 MG/DL — LOW (ref 0.5–1.3)
EGFR: 113 ML/MIN/1.73M2 — SIGNIFICANT CHANGE UP
EGFR: 113 ML/MIN/1.73M2 — SIGNIFICANT CHANGE UP
GLUCOSE BLDC GLUCOMTR-MCNC: 132 MG/DL — HIGH (ref 70–99)
GLUCOSE BLDC GLUCOMTR-MCNC: 132 MG/DL — HIGH (ref 70–99)
GLUCOSE BLDC GLUCOMTR-MCNC: 178 MG/DL — HIGH (ref 70–99)
GLUCOSE BLDC GLUCOMTR-MCNC: 178 MG/DL — HIGH (ref 70–99)
GLUCOSE BLDC GLUCOMTR-MCNC: 255 MG/DL — HIGH (ref 70–99)
GLUCOSE SERPL-MCNC: 123 MG/DL — HIGH (ref 70–99)
GLUCOSE SERPL-MCNC: 123 MG/DL — HIGH (ref 70–99)
HCT VFR BLD CALC: 28.6 % — LOW (ref 34.5–45)
HCT VFR BLD CALC: 28.6 % — LOW (ref 34.5–45)
HGB BLD-MCNC: 8.3 G/DL — LOW (ref 11.5–15.5)
HGB BLD-MCNC: 8.3 G/DL — LOW (ref 11.5–15.5)
MAGNESIUM SERPL-MCNC: 2 MG/DL — SIGNIFICANT CHANGE UP (ref 1.6–2.6)
MAGNESIUM SERPL-MCNC: 2 MG/DL — SIGNIFICANT CHANGE UP (ref 1.6–2.6)
MCHC RBC-ENTMCNC: 28.1 PG — SIGNIFICANT CHANGE UP (ref 27–34)
MCHC RBC-ENTMCNC: 28.1 PG — SIGNIFICANT CHANGE UP (ref 27–34)
MCHC RBC-ENTMCNC: 29 GM/DL — LOW (ref 32–36)
MCHC RBC-ENTMCNC: 29 GM/DL — LOW (ref 32–36)
MCV RBC AUTO: 96.9 FL — SIGNIFICANT CHANGE UP (ref 80–100)
MCV RBC AUTO: 96.9 FL — SIGNIFICANT CHANGE UP (ref 80–100)
NRBC # BLD: 0 /100 WBCS — SIGNIFICANT CHANGE UP (ref 0–0)
NRBC # BLD: 0 /100 WBCS — SIGNIFICANT CHANGE UP (ref 0–0)
PHOSPHATE SERPL-MCNC: 4.1 MG/DL — SIGNIFICANT CHANGE UP (ref 2.5–4.5)
PHOSPHATE SERPL-MCNC: 4.1 MG/DL — SIGNIFICANT CHANGE UP (ref 2.5–4.5)
PLATELET # BLD AUTO: 110 K/UL — LOW (ref 150–400)
PLATELET # BLD AUTO: 110 K/UL — LOW (ref 150–400)
POTASSIUM SERPL-MCNC: 4 MMOL/L — SIGNIFICANT CHANGE UP (ref 3.5–5.3)
POTASSIUM SERPL-MCNC: 4 MMOL/L — SIGNIFICANT CHANGE UP (ref 3.5–5.3)
POTASSIUM SERPL-SCNC: 4 MMOL/L — SIGNIFICANT CHANGE UP (ref 3.5–5.3)
POTASSIUM SERPL-SCNC: 4 MMOL/L — SIGNIFICANT CHANGE UP (ref 3.5–5.3)
RBC # BLD: 2.95 M/UL — LOW (ref 3.8–5.2)
RBC # BLD: 2.95 M/UL — LOW (ref 3.8–5.2)
RBC # FLD: 18.6 % — HIGH (ref 10.3–14.5)
RBC # FLD: 18.6 % — HIGH (ref 10.3–14.5)
SODIUM SERPL-SCNC: 137 MMOL/L — SIGNIFICANT CHANGE UP (ref 135–145)
SODIUM SERPL-SCNC: 137 MMOL/L — SIGNIFICANT CHANGE UP (ref 135–145)
WBC # BLD: 7.1 K/UL — SIGNIFICANT CHANGE UP (ref 3.8–10.5)
WBC # BLD: 7.1 K/UL — SIGNIFICANT CHANGE UP (ref 3.8–10.5)
WBC # FLD AUTO: 7.1 K/UL — SIGNIFICANT CHANGE UP (ref 3.8–10.5)
WBC # FLD AUTO: 7.1 K/UL — SIGNIFICANT CHANGE UP (ref 3.8–10.5)

## 2023-11-16 PROCEDURE — 99233 SBSQ HOSP IP/OBS HIGH 50: CPT

## 2023-11-16 RX ORDER — CEPHALEXIN 500 MG
500 CAPSULE ORAL EVERY 6 HOURS
Refills: 0 | Status: DISCONTINUED | OUTPATIENT
Start: 2023-11-16 | End: 2023-12-11

## 2023-11-16 RX ORDER — CIPROFLOXACIN LACTATE 400MG/40ML
500 VIAL (ML) INTRAVENOUS EVERY 12 HOURS
Refills: 0 | Status: DISCONTINUED | OUTPATIENT
Start: 2023-11-16 | End: 2023-11-20

## 2023-11-16 RX ORDER — ZINC OXIDE 200 MG/G
1 OINTMENT TOPICAL DAILY
Refills: 0 | Status: DISCONTINUED | OUTPATIENT
Start: 2023-11-16 | End: 2024-01-17

## 2023-11-16 RX ADMIN — Medication 15 MILLILITER(S): at 12:08

## 2023-11-16 RX ADMIN — Medication 1 APPLICATION(S): at 06:01

## 2023-11-16 RX ADMIN — Medication 500 MILLIGRAM(S): at 12:09

## 2023-11-16 RX ADMIN — NYSTATIN CREAM 1 APPLICATION(S): 100000 CREAM TOPICAL at 22:30

## 2023-11-16 RX ADMIN — Medication 1 APPLICATION(S): at 06:00

## 2023-11-16 RX ADMIN — Medication 1 DROP(S): at 05:59

## 2023-11-16 RX ADMIN — Medication 125 MICROGRAM(S): at 06:05

## 2023-11-16 RX ADMIN — LIDOCAINE 1 PATCH: 4 CREAM TOPICAL at 19:00

## 2023-11-16 RX ADMIN — Medication 2: at 17:50

## 2023-11-16 RX ADMIN — Medication 2 DROP(S): at 17:45

## 2023-11-16 RX ADMIN — Medication 500 MILLIGRAM(S): at 18:48

## 2023-11-16 RX ADMIN — Medication 1 APPLICATION(S): at 06:11

## 2023-11-16 RX ADMIN — QUETIAPINE FUMARATE 12.5 MILLIGRAM(S): 200 TABLET, FILM COATED ORAL at 22:30

## 2023-11-16 RX ADMIN — ZINC SULFATE TAB 220 MG (50 MG ZINC EQUIVALENT) 220 MILLIGRAM(S): 220 (50 ZN) TAB at 12:10

## 2023-11-16 RX ADMIN — Medication 3 MILLILITER(S): at 05:02

## 2023-11-16 RX ADMIN — SIMETHICONE 80 MILLIGRAM(S): 80 TABLET, CHEWABLE ORAL at 06:00

## 2023-11-16 RX ADMIN — Medication 2 DROP(S): at 05:59

## 2023-11-16 RX ADMIN — LIDOCAINE 1 PATCH: 4 CREAM TOPICAL at 12:09

## 2023-11-16 RX ADMIN — NYSTATIN CREAM 1 APPLICATION(S): 100000 CREAM TOPICAL at 06:01

## 2023-11-16 RX ADMIN — Medication 1 TABLET(S): at 12:09

## 2023-11-16 RX ADMIN — SODIUM CHLORIDE 4 MILLILITER(S): 9 INJECTION INTRAMUSCULAR; INTRAVENOUS; SUBCUTANEOUS at 17:29

## 2023-11-16 RX ADMIN — Medication 500 MILLIGRAM(S): at 18:47

## 2023-11-16 RX ADMIN — Medication 1 APPLICATION(S): at 17:45

## 2023-11-16 RX ADMIN — Medication 5 MILLIGRAM(S): at 05:59

## 2023-11-16 RX ADMIN — ENOXAPARIN SODIUM 40 MILLIGRAM(S): 100 INJECTION SUBCUTANEOUS at 05:56

## 2023-11-16 RX ADMIN — Medication 3 MILLILITER(S): at 17:28

## 2023-11-16 RX ADMIN — SIMETHICONE 80 MILLIGRAM(S): 80 TABLET, CHEWABLE ORAL at 12:09

## 2023-11-16 RX ADMIN — CHLORHEXIDINE GLUCONATE 15 MILLILITER(S): 213 SOLUTION TOPICAL at 17:44

## 2023-11-16 RX ADMIN — Medication 1 TABLET(S): at 17:45

## 2023-11-16 RX ADMIN — Medication 1000 MILLIGRAM(S): at 17:56

## 2023-11-16 RX ADMIN — Medication 6: at 12:11

## 2023-11-16 RX ADMIN — PANTOPRAZOLE SODIUM 40 MILLIGRAM(S): 20 TABLET, DELAYED RELEASE ORAL at 12:09

## 2023-11-16 RX ADMIN — Medication 1000 MILLIGRAM(S): at 18:53

## 2023-11-16 RX ADMIN — Medication 3 MILLIGRAM(S): at 22:30

## 2023-11-16 RX ADMIN — LIDOCAINE 1 PATCH: 4 CREAM TOPICAL at 00:00

## 2023-11-16 RX ADMIN — CHLORHEXIDINE GLUCONATE 15 MILLILITER(S): 213 SOLUTION TOPICAL at 05:59

## 2023-11-16 RX ADMIN — GABAPENTIN 100 MILLIGRAM(S): 400 CAPSULE ORAL at 22:31

## 2023-11-16 RX ADMIN — Medication 1 APPLICATION(S): at 00:00

## 2023-11-16 RX ADMIN — CEFEPIME 100 MILLIGRAM(S): 1 INJECTION, POWDER, FOR SOLUTION INTRAMUSCULAR; INTRAVENOUS at 06:15

## 2023-11-16 RX ADMIN — Medication 2 DROP(S): at 12:10

## 2023-11-16 RX ADMIN — ESCITALOPRAM OXALATE 10 MILLIGRAM(S): 10 TABLET, FILM COATED ORAL at 12:10

## 2023-11-16 RX ADMIN — Medication 1 DROP(S): at 09:21

## 2023-11-16 RX ADMIN — Medication 1 APPLICATION(S): at 17:46

## 2023-11-16 RX ADMIN — INSULIN GLARGINE 14 UNIT(S): 100 INJECTION, SOLUTION SUBCUTANEOUS at 05:55

## 2023-11-16 RX ADMIN — SIMETHICONE 80 MILLIGRAM(S): 80 TABLET, CHEWABLE ORAL at 17:45

## 2023-11-16 RX ADMIN — Medication 5 MILLIGRAM(S): at 12:10

## 2023-11-16 RX ADMIN — SODIUM CHLORIDE 4 MILLILITER(S): 9 INJECTION INTRAMUSCULAR; INTRAVENOUS; SUBCUTANEOUS at 05:02

## 2023-11-16 RX ADMIN — Medication 1 APPLICATION(S): at 12:11

## 2023-11-16 RX ADMIN — CHLORHEXIDINE GLUCONATE 1 APPLICATION(S): 213 SOLUTION TOPICAL at 06:00

## 2023-11-16 RX ADMIN — Medication 1 TABLET(S): at 06:00

## 2023-11-16 NOTE — PROGRESS NOTE ADULT - ASSESSMENT
1) covering for greer  2) peg without leak at this point  3) spoke to staff and daughter last night  4) small blood at tube site  5) no obvious granuction tissue  6) I think this is mainly cosmetic, however, can have surgery   evalatue to see if cautery possible  7) greer will be back on service tomorrow.

## 2023-11-16 NOTE — PROGRESS NOTE ADULT - PROBLEM SELECTOR PLAN 2
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   - S/p CT abd/pelvis (10/28): Sacral decubitus ulcer extending to the coccyx with osseous remodeling and pelvic MRI or bone scan to recc to exclude osteomyelitis.  - MRI pelvis 10/30 with Osteomyelitis, c/w current Cefepime for 7 days, Vancomycin d/marli   - Elevated, ESR 85   - Elevated,    - Wound care on board  -11/9:  Following acute infection,  will resume Cipro 500mg q 12 and Keflex 500mg q 6 until 12/10. 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   - S/p CT abd/pelvis (10/28): Sacral decubitus ulcer extending to the coccyx with osseous remodeling and pelvic MRI or bone scan to recc to exclude osteomyelitis.  - MRI pelvis 10/30 with Osteomyelitis, c/w current Cefepime for 7 days, Vancomycin d/marli   - Elevated, ESR 85   - Elevated,    - Wound care on board  -11/10:  Resumed Cipro 500mg q 12 and Keflex 500mg q 6 until 12/10.

## 2023-11-16 NOTE — PROGRESS NOTE ADULT - PROBLEM SELECTOR PLAN 3
Chronic Trach/ Vent dependant 2/2 Hypercapnic Resp Failure since 10/2022   -S/p Trach # 8 Cuffed Flex Shiley; trach change 8/18, PEG change 8/14 per records   - Continue Home vent settings: (300 TV/ RR 12/5 Peep/ Fio2 30%)  - Tolerates intermittent PSV 15/5 during day/ Vent HS  - Airway Clearance: Duoneb, Hypersal, Chest PT, PRN suctioning   - Maintain 02 sat > 92%  Tracheal Bleeding (Intermittent)-->resolved since admission   -S/p CT Angio neck: No evidence of active bleeding around tracheostomy site. No hematoma.  No collection   - Advise RN staff to use Red rubber trach catheters to avoid suction trauma   - Seen by ENT; Kath and b/l bronchi visualized without any trauma. small amount of blood tinged secretions resting at beginning of right bronchus not actively bleeding.  - 11/3 trach changed to #9 Portex by ENT Chronic Trach/ Vent dependant 2/2 Hypercapnic Resp Failure since 10/2022   -S/p Trach # 8 Cuffed Flex Shiley; trach change 8/18, PEG change 8/14 per records   - Continue Home vent settings: (300 TV/ RR 12/5 Peep/ Fio2 30%)  - Tolerates intermittent PSV 15/5 during day/ Vent HS  - Airway Clearance: Duoneb, Hypersal, Chest PT, PRN suctioning   - Maintain 02 sat > 92%  Tracheal Bleeding (Intermittent)-->resolved since admission   -S/p CT Angio neck: No evidence of active bleeding around tracheostomy site. No hematoma.  No collection   - Seen by ENT; Kath and b/l bronchi visualized without any trauma. small amount of blood tinged secretions resting at beginning of right bronchus not actively bleeding.  - 11/3 trach changed to #9 Portex by ENT

## 2023-11-16 NOTE — PROGRESS NOTE ADULT - SUBJECTIVE AND OBJECTIVE BOX
Patient is a 73y old  Female who presents with a chief complaint of peg tube (16 Nov 2023 06:51)      Interval Events:    REVIEW OF SYSTEMS:  [ ] Positive  [ ] All other systems negative  [ ] Unable to assess ROS because ________    Vital Signs Last 24 Hrs  T(C): 36.5 (11-16-23 @ 05:17), Max: 38.1 (11-15-23 @ 18:44)  T(F): 97.7 (11-16-23 @ 05:17), Max: 100.6 (11-15-23 @ 18:44)  HR: 71 (11-16-23 @ 05:17) (71 - 98)  BP: 103/53 (11-16-23 @ 05:17) (103/53 - 153/79)  RR: 12 (11-16-23 @ 05:17) (12 - 23)  SpO2: 98% (11-16-23 @ 05:17) (98% - 100%)    PHYSICAL EXAM:  HEENT:   [ ]Tracheostomy:  [ ]Pupils equal  [ ]No oral lesions  [ ]Abnormal    SKIN  [ ]No Rash  [ ] Abnormal  [ ] pressure    CARDIAC  [ ]Regular  [ ]Abnormal    PULMONARY  [ ]Bilateral Clear Breath Sounds  [ ]Normal Excursion  [ ]Abnormal    GI  [ ]PEG      [ ] +BS		              [ ]Soft, nondistended, nontender	  [ ]Abnormal    MUSCULOSKELETAL                                   [ ]Bedbound                 [ ]Abnormal    [ ]Ambulatory/OOB to chair                           EXTREMITIES                                         [ ]Normal  [ ]Edema                           NEUROLOGIC  [ ] Normal, non focal  [ ] Focal findings:    PSYCHIATRIC  [ ]Alert and appropriate  [ ] Sedated	 [ ]Agitated    :  Mcbride: [ ] Yes, if yes: Date of Placement:                   [  ] No    LINES: Central Lines [ ] Yes, if yes: Date of Placement                                     [  ] No    HOSPITAL MEDICATIONS:  MEDICATIONS  (STANDING):  albuterol/ipratropium for Nebulization 3 milliLiter(s) Nebulizer every 6 hours  artificial  tears Solution 2 Drop(s) Both EYES four times a day  ascorbic acid 500 milliGRAM(s) Oral daily  bacitracin   Ointment 1 Application(s) Topical two times a day  calcium carbonate    500 mG (Tums) Chewable 1 Tablet(s) Chew every 12 hours  cefepime   IVPB      cefepime   IVPB 2000 milliGRAM(s) IV Intermittent every 8 hours  chlorhexidine 0.12% Liquid 15 milliLiter(s) Oral Mucosa every 12 hours  chlorhexidine 4% Liquid 1 Application(s) Topical <User Schedule>  ciprofloxacin  0.3% Ophthalmic Solution 1 Drop(s) Both EYES every 4 hours  dextrose 50% Injectable 50 milliLiter(s) IV Push once  enoxaparin Injectable 40 milliGRAM(s) SubCutaneous every 24 hours  escitalopram 10 milliGRAM(s) Oral daily  gabapentin Solution 100 milliGRAM(s) Enteral Tube at bedtime  insulin glargine Injectable (LANTUS) 14 Unit(s) SubCutaneous every morning  insulin lispro (ADMELOG) corrective regimen sliding scale   SubCutaneous every 6 hours  lactobacillus acidophilus 1 Tablet(s) Oral daily  levothyroxine 125 MICROGram(s) Oral daily  lidocaine   4% Patch 1 Patch Transdermal daily  melatonin 3 milliGRAM(s) Oral at bedtime  multivitamin/minerals/iron Oral Solution (CENTRUM) 15 milliLiter(s) Oral daily  nystatin Powder 1 Application(s) Topical every 8 hours  oxybutynin 5 milliGRAM(s) Oral daily  pantoprazole   Suspension 40 milliGRAM(s) Enteral Tube daily  petrolatum Ophthalmic Ointment 1 Application(s) Both EYES four times a day  predniSONE   Tablet 5 milliGRAM(s) Oral daily  QUEtiapine 12.5 milliGRAM(s) Oral <User Schedule>  QUEtiapine 12.5 milliGRAM(s) Oral <User Schedule>  simethicone 80 milliGRAM(s) Chew four times a day  sodium chloride 3%  Inhalation 4 milliLiter(s) Inhalation every 12 hours  triamcinolone 0.1% Ointment 1 Application(s) Topical two times a day  zinc sulfate 220 milliGRAM(s) Oral daily    MEDICATIONS  (PRN):  acetaminophen   Oral Liquid .. 1000 milliGRAM(s) Enteral Tube every 6 hours PRN Temp greater or equal to 38C (100.4F), Mild Pain (1 - 3)  diphenhydrAMINE Elixir 25 milliGRAM(s) Oral every 6 hours PRN Rash and/or Itching  oxyCODONE    Solution 2.5 milliGRAM(s) Oral every 6 hours PRN Moderate Pain (4 - 6)  sodium chloride 0.65% Nasal 1 Spray(s) Both Nostrils every 6 hours PRN Nasal Congestion      LABS:                        8.3    7.10  )-----------( 110      ( 16 Nov 2023 06:39 )             28.6     11-16    137  |  100  |  37<H>  ----------------------------<  123<H>  4.0   |  28  |  <0.30<L>    Ca    9.8      16 Nov 2023 06:39  Phos  4.1     11-16  Mg     2.0     11-16        Urinalysis Basic - ( 16 Nov 2023 06:39 )    Color: x / Appearance: x / SG: x / pH: x  Gluc: 123 mg/dL / Ketone: x  / Bili: x / Urobili: x   Blood: x / Protein: x / Nitrite: x   Leuk Esterase: x / RBC: x / WBC x   Sq Epi: x / Non Sq Epi: x / Bacteria: x          CAPILLARY BLOOD GLUCOSE    MICROBIOLOGY:     RADIOLOGY:  [ ] Reviewed and interpreted by me    Mode: AC/ CMV (Assist Control/ Continuous Mandatory Ventilation)  RR (machine): 12  TV (machine): 300  FiO2: 30  PEEP: 5  ITime: 1  MAP: 8  PIP: 25   Patient is a 73y old  Female who presents with a chief complaint of peg tube (16 Nov 2023 06:51)      Interval Events: Fever of 100.6F over night.    REVIEW OF SYSTEMS:  [X] Positive: Back wound pain  [ ] All other systems negative  [ ] Unable to assess ROS because ____    Vital Signs Last 24 Hrs  T(C): 36.5 (11-16-23 @ 05:17), Max: 38.1 (11-15-23 @ 18:44)  T(F): 97.7 (11-16-23 @ 05:17), Max: 100.6 (11-15-23 @ 18:44)  HR: 71 (11-16-23 @ 05:17) (71 - 98)  BP: 103/53 (11-16-23 @ 05:17) (103/53 - 153/79)  RR: 12 (11-16-23 @ 05:17) (12 - 23)  SpO2: 98% (11-16-23 @ 05:17) (98% - 100%)      PHYSICAL EXAM:  HEENT:   [X] Tracheostomy:  #9 Cuffed Portex  [X] 4mm PERRL B/L  [X] No oral lesions  [ ] Abnormal    SKIN  [ ] No Rash  [ ]  Abnormal  [X] pressure: Sacral wound    CARDIAC  [X] Regular  [ ] Abnormal    PULMONARY  [X] Bilateral Clear Breath Sounds  [ ] Normal Excursion  [ ] Abnormal    GI  [X] PEG      [X] +BS		              [X] Soft, nondistended, nontender	  [ ] Abnormal    MUSCULOSKELETAL                                   [  ] Bedbound                 [X] Abnormal:  Deconditioned but can partially move all limbs   [ ] Ambulatory/OOB to chair                           EXTREMITIES                                         [X] Normal  [ ]Edema                           NEUROLOGIC  [ ] Normal, non focal  [X] Focal findings:  Alert and Oriented x2; responds appropriately top questions by mouthing and able to phonate while on vent; B/L foot drop with partial ability to dorsiflex; moves both arms with minimal hand dexterity B/L    PSYCHIATRIC  [X] Alert; somewhat anxious at times but mostly appropriate  [ ] Sedated	 [ ]Agitated    :  Mcbride: [ ] Yes, if yes: Date of Placement:                   [X] No    LINES: Central Lines [ ] Yes, if yes: Date of Placement                                     [X] No      HOSPITAL MEDICATIONS:  MEDICATIONS  (STANDING):  albuterol/ipratropium for Nebulization 3 milliLiter(s) Nebulizer every 6 hours  artificial  tears Solution 2 Drop(s) Both EYES four times a day  ascorbic acid 500 milliGRAM(s) Oral daily  bacitracin   Ointment 1 Application(s) Topical two times a day  calcium carbonate    500 mG (Tums) Chewable 1 Tablet(s) Chew every 12 hours  cefepime   IVPB      cefepime   IVPB 2000 milliGRAM(s) IV Intermittent every 8 hours  chlorhexidine 0.12% Liquid 15 milliLiter(s) Oral Mucosa every 12 hours  chlorhexidine 4% Liquid 1 Application(s) Topical <User Schedule>  ciprofloxacin  0.3% Ophthalmic Solution 1 Drop(s) Both EYES every 4 hours  dextrose 50% Injectable 50 milliLiter(s) IV Push once  enoxaparin Injectable 40 milliGRAM(s) SubCutaneous every 24 hours  escitalopram 10 milliGRAM(s) Oral daily  gabapentin Solution 100 milliGRAM(s) Enteral Tube at bedtime  insulin glargine Injectable (LANTUS) 14 Unit(s) SubCutaneous every morning  insulin lispro (ADMELOG) corrective regimen sliding scale   SubCutaneous every 6 hours  lactobacillus acidophilus 1 Tablet(s) Oral daily  levothyroxine 125 MICROGram(s) Oral daily  lidocaine   4% Patch 1 Patch Transdermal daily  melatonin 3 milliGRAM(s) Oral at bedtime  multivitamin/minerals/iron Oral Solution (CENTRUM) 15 milliLiter(s) Oral daily  nystatin Powder 1 Application(s) Topical every 8 hours  oxybutynin 5 milliGRAM(s) Oral daily  pantoprazole   Suspension 40 milliGRAM(s) Enteral Tube daily  petrolatum Ophthalmic Ointment 1 Application(s) Both EYES four times a day  predniSONE   Tablet 5 milliGRAM(s) Oral daily  QUEtiapine 12.5 milliGRAM(s) Oral <User Schedule>  QUEtiapine 12.5 milliGRAM(s) Oral <User Schedule>  simethicone 80 milliGRAM(s) Chew four times a day  sodium chloride 3%  Inhalation 4 milliLiter(s) Inhalation every 12 hours  triamcinolone 0.1% Ointment 1 Application(s) Topical two times a day  zinc sulfate 220 milliGRAM(s) Oral daily    MEDICATIONS  (PRN):  acetaminophen   Oral Liquid .. 1000 milliGRAM(s) Enteral Tube every 6 hours PRN Temp greater or equal to 38C (100.4F), Mild Pain (1 - 3)  diphenhydrAMINE Elixir 25 milliGRAM(s) Oral every 6 hours PRN Rash and/or Itching  oxyCODONE    Solution 2.5 milliGRAM(s) Oral every 6 hours PRN Moderate Pain (4 - 6)  sodium chloride 0.65% Nasal 1 Spray(s) Both Nostrils every 6 hours PRN Nasal Congestion      LABS:                        8.3    7.10  )-----------( 110      ( 16 Nov 2023 06:39 )             28.6     11-16    137  |  100  |  37<H>  ----------------------------<  123<H>  4.0   |  28  |  <0.30<L>    Ca    9.8      16 Nov 2023 06:39  Phos  4.1     11-16  Mg     2.0     11-16        Urinalysis Basic - ( 16 Nov 2023 06:39 )    Color: x / Appearance: x / SG: x / pH: x  Gluc: 123 mg/dL / Ketone: x  / Bili: x / Urobili: x   Blood: x / Protein: x / Nitrite: x   Leuk Esterase: x / RBC: x / WBC x   Sq Epi: x / Non Sq Epi: x / Bacteria: x          CAPILLARY BLOOD GLUCOSE    MICROBIOLOGY:     RADIOLOGY:  [ ] Reviewed and interpreted by me    Mode: AC/ CMV (Assist Control/ Continuous Mandatory Ventilation)  RR (machine): 12  TV (machine): 300  FiO2: 30  PEEP: 5  ITime: 1  MAP: 8  PIP: 25

## 2023-11-16 NOTE — PROGRESS NOTE ADULT - SUBJECTIVE AND OBJECTIVE BOX
2023      SUBJECTIVE / OVERNIGHT EVENTS: Afebrile, awake and alert  No new symptoms    MEDICATIONS  (STANDING):  albuterol/ipratropium for Nebulization 3 milliLiter(s) Nebulizer every 6 hours  artificial  tears Solution 2 Drop(s) Both EYES four times a day  ascorbic acid 500 milliGRAM(s) Oral daily  calcium carbonate    500 mG (Tums) Chewable 1 Tablet(s) Chew every 12 hours  cefepime   IVPB      cefepime   IVPB 2000 milliGRAM(s) IV Intermittent every 8 hours  chlorhexidine 0.12% Liquid 15 milliLiter(s) Oral Mucosa every 12 hours  chlorhexidine 4% Liquid 1 Application(s) Topical <User Schedule>  dextrose 50% Injectable 50 milliLiter(s) IV Push once  enoxaparin Injectable 40 milliGRAM(s) SubCutaneous every 24 hours  escitalopram 10 milliGRAM(s) Oral daily  fentaNYL   Patch  25 MICROgram(s)/Hr 1 Patch Transdermal every 72 hours  gabapentin Solution 100 milliGRAM(s) Enteral Tube at bedtime  insulin glargine Injectable (LANTUS) 14 Unit(s) SubCutaneous every morning  insulin lispro (ADMELOG) corrective regimen sliding scale   SubCutaneous every 6 hours  lactobacillus acidophilus 1 Tablet(s) Oral daily  levothyroxine 125 MICROGram(s) Oral daily  lidocaine   4% Patch 1 Patch Transdermal daily  multivitamin/minerals/iron Oral Solution (CENTRUM) 15 milliLiter(s) Oral daily  nystatin Powder 1 Application(s) Topical every 8 hours  oxybutynin 5 milliGRAM(s) Oral daily  pantoprazole   Suspension 40 milliGRAM(s) Enteral Tube daily  petrolatum Ophthalmic Ointment 1 Application(s) Both EYES four times a day  polyethylene glycol 3350 17 Gram(s) Oral two times a day  predniSONE   Tablet 5 milliGRAM(s) Oral daily  QUEtiapine 12.5 milliGRAM(s) Oral <User Schedule>  simethicone 80 milliGRAM(s) Chew four times a day  sodium chloride 3%  Inhalation 4 milliLiter(s) Inhalation every 12 hours  zinc sulfate 220 milliGRAM(s) Oral daily    MEDICATIONS  (PRN):  acetaminophen   Oral Liquid .. 1000 milliGRAM(s) Enteral Tube every 6 hours PRN Temp greater or equal to 38C (100.4F), Mild Pain (1 - 3)  diphenhydrAMINE Elixir 25 milliGRAM(s) Oral every 6 hours PRN Rash and/or Itching  oxyCODONE    Solution 2.5 milliGRAM(s) Oral every 6 hours PRN Moderate Pain (4 - 6)  sodium chloride 0.65% Nasal 1 Spray(s) Both Nostrils every 6 hours PRN Nasal Congestion        Telemetry:   CAPILLARY BLOOD GLUCOSE      POCT Blood Glucose.: 255 mg/dL (2023 11:36)  POCT Blood Glucose.: 132 mg/dL (2023 05:08)  POCT Blood Glucose.: 200 mg/dL (15 Nov 2023 23:51)  POCT Blood Glucose.: 172 mg/dL (15 Nov 2023 17:12)    I&O's Summary    2023 07:01  -  2023 07:00  --------------------------------------------------------  IN: 1440 mL / OUT: 1150 mL / NET: 290 mL        PHYSICAL EXAM:    Vital Signs Last 24 Hrs  T(C): 36.6 (2023 13:47), Max: 38.1 (15 Nov 2023 18:44)  T(F): 97.9 (2023 13:47), Max: 100.6 (15 Nov 2023 18:44)  HR: 87 (2023 13:47) (70 - 98)  BP: 146/75 (2023 13:47) (103/53 - 146/75)  BP(mean): --  RR: 18 (2023 13:47) (12 - 23)  SpO2: 99% (2023 13:47) (98% - 100%)    Parameters below as of 2023 13:47  Patient On (Oxygen Delivery Method): ventilator            GENERAL: NAD, well-developed  HEAD:  Atraumatic, Normocephalic  EYES: EOMI, PERRLA, conjunctiva and sclera clear  NECK: Supple, No JVD. Trach in place, connected to mech vent   CHEST/LUNG: Clear to auscultation bilaterally; No wheeze  HEART: Regular rate and rhythm; No murmurs, rubs, or gallops  ABDOMEN: Soft, Nontender, Nondistended; Bowel sounds present  EXTREMITIES:  2+ Peripheral Pulses, No clubbing, cyanosis, or edema  PSYCH: AAOx3  NEUROLOGY: non-focal  SKIN: No rashes or lesions    LABS:                        9.2    5.84  )-----------( 131      ( 2023 06:35 )             31.3         137  |  102  |  27<H>  ----------------------------<  135<H>  4.1   |  26  |  <0.30<L>    Ca    9.2      2023 06:37  Phos  4.0       Mg     1.8         TPro  7.0  /  Alb  3.1<L>  /  TBili  0.4  /  DBili  x   /  AST  27  /  ALT  27  /  AlkPhos  47  11-          Urinalysis Basic - ( 2023 10:34 )    Color: Yellow / Appearance: Clear / S.018 / pH: x  Gluc: x / Ketone: Negative mg/dL  / Bili: Negative / Urobili: 0.2 mg/dL   Blood: x / Protein: 30 mg/dL / Nitrite: Negative   Leuk Esterase: Negative / RBC: 4 /HPF / WBC 0 /HPF   Sq Epi: x / Non Sq Epi: 1 /HPF / Bacteria: Negative /HPF        RADIOLOGY & ADDITIONAL TESTS:    Imaging Personally Reviewed:    Consultant(s) Notes Reviewed:      Care Discussed with Consultants/Other Providers:

## 2023-11-16 NOTE — PROGRESS NOTE ADULT - ASSESSMENT
74 y/o F with PMHx of T2DM, HTN, HLD, anemia, hypothyroidism, RA, fibromyalgia, remote cerebral aneurysm repair, acute hypercapnic respiratory failure now with tracheostomy, PEG tube, and bedbound (trach change 8/18, PEG change 8/14), sacral pressure ulcer, BIBEMS from home for 6 day hx of bleeding from the tracheostomy and PEG tube with associated abdominal pain, recent history of auditory and visual hallucinations, and with chronic sacral decubitus ulcer. Exam notable for mild abdominal distention with LLQ and RLQ tenderness, tracheostomy in place with no obvious bleeding, PEG tube with scant amount of dried blood, at baseline neurologic status. Imaging showing no active bleeding around tracheostomy site, however was notable for mild thickening along PEG tube tract, sacral ulcer extending to the coccyx suggestive of possible osteomyelitis, and bibasilar patchy lung opacities with volume loss. Patient admitted for respiratory support, wound care of tracheostomy and PEG, and management of sacral osteomyelitis. No further Trach and peg site  bleeding noted since admission.  Pelvic MRI imaging also suggestive of Acute OM. Patient placed on long-term antibiotics with Infectious disease guidance.  Additionally treated with a 7d course of Cefepime due to PSA and Serratia tracheitis. Once completed,  plan to change abx back to Cipro 500mg q 12 and Keflex 500mg q 6 to complete a total of 6 weeks. Hospital course c/b Delirium for which Seroquel was added and home Lexapro continued. Tracheostomy changed to #9 Cuffed Portex by ENT 11/3.     11/1: Last Day of Cefepime, Thrombocytopenia stable. Resume oral abx tomorrow (11/16) for completion of OM treatment with Cipro 500mg q 12 and Keflex 500mg q 6 until 12/10. Small bleeding noted at stoma site, h/h remains stable, GI ask to see if any intervention.            72 y/o F with PMHx of T2DM, HTN, HLD, anemia, hypothyroidism, RA, fibromyalgia, remote cerebral aneurysm repair, acute hypercapnic respiratory failure now with tracheostomy, PEG tube, and bedbound (trach change 8/18, PEG change 8/14), sacral pressure ulcer, BIBEMS from home for 6 day hx of bleeding from the tracheostomy and PEG tube with associated abdominal pain, recent history of auditory and visual hallucinations, and with chronic sacral decubitus ulcer. Exam notable for mild abdominal distention with LLQ and RLQ tenderness, tracheostomy in place with no obvious bleeding, PEG tube with scant amount of dried blood, at baseline neurologic status. Imaging showing no active bleeding around tracheostomy site, however was notable for mild thickening along PEG tube tract, sacral ulcer extending to the coccyx suggestive of possible osteomyelitis, and bibasilar patchy lung opacities with volume loss. Patient admitted for respiratory support, wound care of tracheostomy and PEG, and management of sacral osteomyelitis. No further Trach and peg site  bleeding noted since admission.  Pelvic MRI imaging also suggestive of Acute OM. Patient placed on long-term antibiotics with Infectious disease guidance.  Additionally treated with a 7d course of Cefepime due to PSA and Serratia tracheitis. Once completed,  plan to change abx back to Cipro 500mg q 12 and Keflex 500mg q 6 to complete a total of 6 weeks. Hospital course c/b Delirium for which Seroquel was added and home Lexapro continued. Tracheostomy changed to #9 Cuffed Portex by ENT 11/3.     11/15: Last Day of Cefepime, Thrombocytopenia stable. Resume oral abx tomorrow (11/16) for completion of OM treatment with Cipro 500mg q 12 and Keflex 500mg q 6 until 12/10. Small bleeding noted at stoma site, h/h remains stable, GI ask to see if any intervention.   11/16:  Fever of 100.6F over night noted.  Resuming Cipro and Keflex for osteomyelitis treatment.  Patient attempted PS 15/5 however poorly tolerated with apnea.   in discussions with daughter in regards to disposition to skilled nursing facility.

## 2023-11-16 NOTE — PROGRESS NOTE ADULT - NS ATTEND AMEND GEN_ALL_CORE FT
73F with a h/o DM, HTN, HLD, anemia, hypothyroidism, RA, fibromyalgia, remote cerebral aneurysm with repair, hypercapnic respiratory failure s/p tracheostomy/PEG, bedbound, sacral pressure ulcer. Admitted w/ bleeding from tracheostomy and PEG w/ associated abdominal pain, recent hx of auditory/visual hallucinations. Since admission, no further bleeding noted from either trach or PEG. Imaging showed mild thickening along PEG tube tract and sacral ulcer extending to coccyx suggestive of OM.     MRI with osteomyelitis  Repeat sputum now with serratia, increased secretions back on abx.  Improving delirium, patient awake and appropriately interacting this morning.     # Osteomyelitis  # Chronic hypoxemic RF  # oropharyngeal dysphagia  # acute on chronic pain  # Trach/Peg bleeding  # Tracheitis with PA and serratia  # Hx of hallucination, anxiety    - MRI showing sacral Osteo, on abx per ID, c/w wound care, no plans for surgery. Will need prolonged course of abx. Afebrile, cultures from 11/12 NGTD. Completed Cefepime on 11/15, will start Cipro and Keflex.  Will f/u with ID.   - CXR 11/12 with LLL atelectasis, being treated with standing Duonebs and 3% Hypersal, chest PT. CXR appears improved and secretions mild  - PS 10/5 during the day, vent at night, and will need vent on discharge  - Sputum culture with PA and serratia, last day of Cefepime.   - family requested S/S eval but unable to given dependence on vent  - C/W pain control, multifactorial 2/2 to fibromyalgia as well as now osteo with pressure ulcer.    - Peg irritation seen by GI, no recs for further interventions. H/H stable  - Thrombocytopenia - improved   - Appreciate  follow up for delirium and hallucinations, patient calm and appropriate today - c/w Seroquel 12.5 mg at 3 pm and another 12.5 mg at 8 pm. Melatonin QHS for sleep. c/w Lexapro at current dose.  - Corneal erythema improved, no evidence of discharge, completed a course of Cipro eye drops, c/w artificial tears  - Appreciate derm consult for right back skin lesion- dermatofibroma, benign lesion. Mid back with irritated seborrheic keratosis, topical triamcinolone 0.1% bid   - DVT ppx- lovenox  - Dispo- full code.

## 2023-11-16 NOTE — PROGRESS NOTE ADULT - SUBJECTIVE AND OBJECTIVE BOX
Patient is a 73y Female     Patient is a 73y old  Female who presents with a chief complaint of dysphagia (15 Nov 2023 06:25)      HPI:  74 y/o F with PMHx of T2DM, HTN, HLD, anemia, hypothyroidism, RA, fibromyalgia, remote cerebral aneurysm repair, acute hypercapnic respiratory failure now with tracheostomy, PEG tube, and bedbound (trach change 8/18, PEG change 8/14), sacral pressure ulcer, BIBEMS from home for 6 day hx of bleeding from the tracheostomy and PEG tube with associated abdominal pain. Patient still having regular bowel movements, last one occurred prior to ED arrival. Was seen outpatient on 10/26 by critical care Dr. Zaki Gottlieb and apparently had cultures sent at that time. Patient also noted to have chronic sacral decubitous ulcer. Family endorsed one episode of fever, though was not febrile on evaluation. Daughter noted patient was recently experiencing auditory and visual hallucinations (seeing animals that were not there & hearing a "bomb" going off outside her home), though patient not actively hallucinating on evaluation.    ED course notable for CT neck imaging showing no evidence of active bleeding around the tracheostomy site, no hematomas, and no drainable collection around the tracheostomy site. CT abdomen/pelvis notable for gastrostomy tube localized to the stomach with mild thickening noted along the tract; a sacral decubitus ulcer extending to the coccyx with osseous remodeling, possibly representing osteomyelitis; and bibasilar patchy opacities with volume loss in the lungs, representing atelectasis vs infection. Patient admitted for respiratory support, wound care of tracheostomy and PEG, and management of presumed sacral osteomyelitis.   (28 Oct 2023 04:18)      PAST MEDICAL & SURGICAL HISTORY:  Diabetes      Rheumatoid arthritis      Fibromyalgia      Hypothyroid      Hypertension      H/O tracheostomy      PEG (percutaneous endoscopic gastrostomy) status          MEDICATIONS  (STANDING):  albuterol/ipratropium for Nebulization 3 milliLiter(s) Nebulizer every 6 hours  artificial  tears Solution 2 Drop(s) Both EYES four times a day  ascorbic acid 500 milliGRAM(s) Oral daily  bacitracin   Ointment 1 Application(s) Topical two times a day  calcium carbonate    500 mG (Tums) Chewable 1 Tablet(s) Chew every 12 hours  cefepime   IVPB      cefepime   IVPB 2000 milliGRAM(s) IV Intermittent every 8 hours  chlorhexidine 0.12% Liquid 15 milliLiter(s) Oral Mucosa every 12 hours  chlorhexidine 4% Liquid 1 Application(s) Topical <User Schedule>  ciprofloxacin  0.3% Ophthalmic Solution 1 Drop(s) Both EYES every 4 hours  dextrose 50% Injectable 50 milliLiter(s) IV Push once  enoxaparin Injectable 40 milliGRAM(s) SubCutaneous every 24 hours  escitalopram 10 milliGRAM(s) Oral daily  gabapentin Solution 100 milliGRAM(s) Enteral Tube at bedtime  insulin glargine Injectable (LANTUS) 14 Unit(s) SubCutaneous every morning  insulin lispro (ADMELOG) corrective regimen sliding scale   SubCutaneous every 6 hours  lactobacillus acidophilus 1 Tablet(s) Oral daily  levothyroxine 125 MICROGram(s) Oral daily  lidocaine   4% Patch 1 Patch Transdermal daily  melatonin 3 milliGRAM(s) Oral at bedtime  multivitamin/minerals/iron Oral Solution (CENTRUM) 15 milliLiter(s) Oral daily  nystatin Powder 1 Application(s) Topical every 8 hours  oxybutynin 5 milliGRAM(s) Oral daily  pantoprazole   Suspension 40 milliGRAM(s) Enteral Tube daily  petrolatum Ophthalmic Ointment 1 Application(s) Both EYES four times a day  predniSONE   Tablet 5 milliGRAM(s) Oral daily  QUEtiapine 12.5 milliGRAM(s) Oral <User Schedule>  QUEtiapine 12.5 milliGRAM(s) Oral <User Schedule>  simethicone 80 milliGRAM(s) Chew four times a day  sodium chloride 3%  Inhalation 4 milliLiter(s) Inhalation every 12 hours  triamcinolone 0.1% Ointment 1 Application(s) Topical two times a day  zinc sulfate 220 milliGRAM(s) Oral daily      Allergies    penicillin (Unknown)  heparin (Unknown)  Tagamet (Unknown)  pineapple (Unknown)  walnut (Unknown)  Pecan, Filbert, Hazelnut (Unknown)  Lyrica (Unknown)    Intolerances        SOCIAL HISTORY:  Denies ETOh,Smoking,     FAMILY HISTORY:      REVIEW OF SYSTEMS:    CONSTITUTIONAL: No weakness, fevers or chills  EYES/ENT: No visual changes;  No vertigo or throat pain   NECK: No pain or stiffness  RESPIRATORY: No cough, wheezing, hemoptysis; No shortness of breath  CARDIOVASCULAR: No chest pain or palpitations  GASTROINTESTINAL: No abdominal or epigastric pain. No nausea, vomiting, or hematemesis; No diarrhea or constipation. No melena or hematochezia.  GENITOURINARY: No dysuria, frequency or hematuria  NEUROLOGICAL: No numbness or weakness  SKIN: No itching, burning, rashes, or lesions   All other review of systems is negative unless indicated above.    VITAL:  T(C): , Max: 38.1 (11-15-23 @ 18:44)  T(F): , Max: 100.6 (11-15-23 @ 18:44)  HR: 71 (11-16-23 @ 05:17)  BP: 103/53 (11-16-23 @ 05:17)  BP(mean): --  RR: 12 (11-16-23 @ 05:17)  SpO2: 98% (11-16-23 @ 05:17)  Wt(kg): --    I and O's:    11-13 @ 07:01  -  11-14 @ 07:00  --------------------------------------------------------  IN: 1300 mL / OUT: 1650 mL / NET: -350 mL    11-14 @ 07:01  -  11-15 @ 07:00  --------------------------------------------------------  IN: 1020 mL / OUT: 300 mL / NET: 720 mL    11-15 @ 07:01  -  11-16 @ 06:51  --------------------------------------------------------  IN: 1240 mL / OUT: 900 mL / NET: 340 mL          PHYSICAL EXAM:    Constitutional: NAD  HEENT: PERRLA,   Neck: No JVD  Respiratory: CTA B/L  Cardiovascular: S1 and S2  Gastrointestinal: BS+, soft, NT/ND  Extremities: No peripheral edema  Neurological: A/O x 3, no focal deficits  Psychiatric: Normal mood, normal affect  : No Mcbride  Skin: No rashes  Access: Not applicable  Back: No CVA tenderness    LABS:                        8.4    7.05  )-----------( 121      ( 15 Nov 2023 06:40 )             28.9     11-15    136  |  100  |  43<H>  ----------------------------<  118<H>  4.3   |  28  |  <0.30<L>    Ca    9.9      15 Nov 2023 06:40  Phos  4.4     11-15  Mg     2.1     11-15            RADIOLOGY & ADDITIONAL STUDIES:

## 2023-11-17 LAB
CULTURE RESULTS: SIGNIFICANT CHANGE UP
GLUCOSE BLDC GLUCOMTR-MCNC: 133 MG/DL — HIGH (ref 70–99)
GLUCOSE BLDC GLUCOMTR-MCNC: 133 MG/DL — HIGH (ref 70–99)
GLUCOSE BLDC GLUCOMTR-MCNC: 197 MG/DL — HIGH (ref 70–99)
GLUCOSE BLDC GLUCOMTR-MCNC: 197 MG/DL — HIGH (ref 70–99)
GLUCOSE BLDC GLUCOMTR-MCNC: 202 MG/DL — HIGH (ref 70–99)
GLUCOSE BLDC GLUCOMTR-MCNC: 202 MG/DL — HIGH (ref 70–99)
GLUCOSE BLDC GLUCOMTR-MCNC: 265 MG/DL — HIGH (ref 70–99)
GLUCOSE BLDC GLUCOMTR-MCNC: 265 MG/DL — HIGH (ref 70–99)
SPECIMEN SOURCE: SIGNIFICANT CHANGE UP

## 2023-11-17 PROCEDURE — ZZZZZ: CPT

## 2023-11-17 PROCEDURE — 99233 SBSQ HOSP IP/OBS HIGH 50: CPT

## 2023-11-17 RX ORDER — LEVOTHYROXINE SODIUM 125 MCG
125 TABLET ORAL
Refills: 0 | Status: DISCONTINUED | OUTPATIENT
Start: 2023-11-17 | End: 2024-01-17

## 2023-11-17 RX ORDER — BENZOCAINE 10 %
1 GEL (GRAM) MUCOUS MEMBRANE
Refills: 0 | Status: DISCONTINUED | OUTPATIENT
Start: 2023-11-17 | End: 2024-01-17

## 2023-11-17 RX ORDER — POLYETHYLENE GLYCOL 3350 17 G/17G
17 POWDER, FOR SOLUTION ORAL
Refills: 0 | Status: DISCONTINUED | OUTPATIENT
Start: 2023-11-19 | End: 2023-11-21

## 2023-11-17 RX ORDER — SENNA PLUS 8.6 MG/1
10 TABLET ORAL AT BEDTIME
Refills: 0 | Status: DISCONTINUED | OUTPATIENT
Start: 2023-11-17 | End: 2023-11-21

## 2023-11-17 RX ADMIN — Medication 1 TABLET(S): at 17:03

## 2023-11-17 RX ADMIN — SIMETHICONE 80 MILLIGRAM(S): 80 TABLET, CHEWABLE ORAL at 17:04

## 2023-11-17 RX ADMIN — Medication 1 APPLICATION(S): at 11:54

## 2023-11-17 RX ADMIN — SENNA PLUS 10 MILLILITER(S): 8.6 TABLET ORAL at 11:45

## 2023-11-17 RX ADMIN — ENOXAPARIN SODIUM 40 MILLIGRAM(S): 100 INJECTION SUBCUTANEOUS at 06:05

## 2023-11-17 RX ADMIN — Medication 2 DROP(S): at 11:54

## 2023-11-17 RX ADMIN — SIMETHICONE 80 MILLIGRAM(S): 80 TABLET, CHEWABLE ORAL at 00:32

## 2023-11-17 RX ADMIN — SENNA PLUS 10 MILLILITER(S): 8.6 TABLET ORAL at 21:13

## 2023-11-17 RX ADMIN — Medication 500 MILLIGRAM(S): at 17:02

## 2023-11-17 RX ADMIN — Medication 1 APPLICATION(S): at 06:08

## 2023-11-17 RX ADMIN — Medication 1 APPLICATION(S): at 17:03

## 2023-11-17 RX ADMIN — Medication 3 MILLILITER(S): at 06:18

## 2023-11-17 RX ADMIN — Medication 500 MILLIGRAM(S): at 00:32

## 2023-11-17 RX ADMIN — LIDOCAINE 1 PATCH: 4 CREAM TOPICAL at 11:50

## 2023-11-17 RX ADMIN — SIMETHICONE 80 MILLIGRAM(S): 80 TABLET, CHEWABLE ORAL at 11:55

## 2023-11-17 RX ADMIN — LIDOCAINE 1 PATCH: 4 CREAM TOPICAL at 18:49

## 2023-11-17 RX ADMIN — Medication 6: at 11:55

## 2023-11-17 RX ADMIN — CHLORHEXIDINE GLUCONATE 15 MILLILITER(S): 213 SOLUTION TOPICAL at 06:06

## 2023-11-17 RX ADMIN — ZINC OXIDE 1 APPLICATION(S): 200 OINTMENT TOPICAL at 17:04

## 2023-11-17 RX ADMIN — SIMETHICONE 80 MILLIGRAM(S): 80 TABLET, CHEWABLE ORAL at 06:05

## 2023-11-17 RX ADMIN — Medication 1 APPLICATION(S): at 06:06

## 2023-11-17 RX ADMIN — Medication 4: at 18:19

## 2023-11-17 RX ADMIN — Medication 500 MILLIGRAM(S): at 17:03

## 2023-11-17 RX ADMIN — Medication 0: at 06:05

## 2023-11-17 RX ADMIN — Medication 500 MILLIGRAM(S): at 11:47

## 2023-11-17 RX ADMIN — QUETIAPINE FUMARATE 12.5 MILLIGRAM(S): 200 TABLET, FILM COATED ORAL at 14:03

## 2023-11-17 RX ADMIN — Medication 3 MILLILITER(S): at 17:43

## 2023-11-17 RX ADMIN — Medication 5 MILLIGRAM(S): at 11:51

## 2023-11-17 RX ADMIN — GABAPENTIN 100 MILLIGRAM(S): 400 CAPSULE ORAL at 21:13

## 2023-11-17 RX ADMIN — Medication 500 MILLIGRAM(S): at 06:09

## 2023-11-17 RX ADMIN — SODIUM CHLORIDE 4 MILLILITER(S): 9 INJECTION INTRAMUSCULAR; INTRAVENOUS; SUBCUTANEOUS at 17:44

## 2023-11-17 RX ADMIN — Medication 1 TABLET(S): at 11:50

## 2023-11-17 RX ADMIN — INSULIN GLARGINE 14 UNIT(S): 100 INJECTION, SOLUTION SUBCUTANEOUS at 06:13

## 2023-11-17 RX ADMIN — LIDOCAINE 1 PATCH: 4 CREAM TOPICAL at 00:00

## 2023-11-17 RX ADMIN — Medication 2 DROP(S): at 06:06

## 2023-11-17 RX ADMIN — Medication 2 DROP(S): at 00:32

## 2023-11-17 RX ADMIN — Medication 3 MILLILITER(S): at 00:08

## 2023-11-17 RX ADMIN — PANTOPRAZOLE SODIUM 40 MILLIGRAM(S): 20 TABLET, DELAYED RELEASE ORAL at 11:46

## 2023-11-17 RX ADMIN — Medication 5 MILLIGRAM(S): at 06:07

## 2023-11-17 RX ADMIN — CHLORHEXIDINE GLUCONATE 1 APPLICATION(S): 213 SOLUTION TOPICAL at 06:07

## 2023-11-17 RX ADMIN — QUETIAPINE FUMARATE 12.5 MILLIGRAM(S): 200 TABLET, FILM COATED ORAL at 21:12

## 2023-11-17 RX ADMIN — SODIUM CHLORIDE 4 MILLILITER(S): 9 INJECTION INTRAMUSCULAR; INTRAVENOUS; SUBCUTANEOUS at 06:38

## 2023-11-17 RX ADMIN — Medication 125 MICROGRAM(S): at 06:07

## 2023-11-17 RX ADMIN — NYSTATIN CREAM 1 APPLICATION(S): 100000 CREAM TOPICAL at 21:13

## 2023-11-17 RX ADMIN — Medication 3 MILLIGRAM(S): at 21:13

## 2023-11-17 RX ADMIN — ZINC SULFATE TAB 220 MG (50 MG ZINC EQUIVALENT) 220 MILLIGRAM(S): 220 (50 ZN) TAB at 11:46

## 2023-11-17 RX ADMIN — ESCITALOPRAM OXALATE 10 MILLIGRAM(S): 10 TABLET, FILM COATED ORAL at 11:51

## 2023-11-17 RX ADMIN — Medication 15 MILLILITER(S): at 11:44

## 2023-11-17 RX ADMIN — Medication 1 TABLET(S): at 06:07

## 2023-11-17 RX ADMIN — NYSTATIN CREAM 1 APPLICATION(S): 100000 CREAM TOPICAL at 06:07

## 2023-11-17 RX ADMIN — Medication 500 MILLIGRAM(S): at 11:51

## 2023-11-17 RX ADMIN — Medication 1 APPLICATION(S): at 00:33

## 2023-11-17 RX ADMIN — CHLORHEXIDINE GLUCONATE 15 MILLILITER(S): 213 SOLUTION TOPICAL at 17:04

## 2023-11-17 RX ADMIN — Medication 2 DROP(S): at 17:04

## 2023-11-17 RX ADMIN — Medication 2: at 00:31

## 2023-11-17 RX ADMIN — NYSTATIN CREAM 1 APPLICATION(S): 100000 CREAM TOPICAL at 14:04

## 2023-11-17 RX ADMIN — Medication 1 APPLICATION(S): at 17:04

## 2023-11-17 RX ADMIN — Medication 1 APPLICATION(S): at 06:09

## 2023-11-17 RX ADMIN — Medication 3 MILLILITER(S): at 23:23

## 2023-11-17 RX ADMIN — Medication 3 MILLILITER(S): at 11:15

## 2023-11-17 NOTE — PROGRESS NOTE ADULT - SUBJECTIVE AND OBJECTIVE BOX
Patient is a 73y old  Female who presents with a chief complaint of peg tube (16 Nov 2023 06:51)      SUBJECTIVE / OVERNIGHT EVENTS: awake, alert, no new events    MEDICATIONS  (STANDING):  albuterol/ipratropium for Nebulization 3 milliLiter(s) Nebulizer every 6 hours  artificial  tears Solution 2 Drop(s) Both EYES four times a day  ascorbic acid 500 milliGRAM(s) Oral daily  bacitracin   Ointment 1 Application(s) Topical two times a day  bisacodyl Suppository 10 milliGRAM(s) Rectal once  calcium carbonate    500 mG (Tums) Chewable 1 Tablet(s) Chew every 12 hours  cephalexin 500 milliGRAM(s) Oral every 6 hours  chlorhexidine 0.12% Liquid 15 milliLiter(s) Oral Mucosa every 12 hours  chlorhexidine 4% Liquid 1 Application(s) Topical <User Schedule>  ciprofloxacin     Tablet 500 milliGRAM(s) Oral every 12 hours  dextrose 50% Injectable 50 milliLiter(s) IV Push once  enoxaparin Injectable 40 milliGRAM(s) SubCutaneous every 24 hours  escitalopram 10 milliGRAM(s) Oral daily  fentaNYL   Patch  25 MICROgram(s)/Hr 1 Patch Transdermal every 72 hours  gabapentin Solution 100 milliGRAM(s) Enteral Tube at bedtime  insulin glargine Injectable (LANTUS) 14 Unit(s) SubCutaneous every morning  insulin lispro (ADMELOG) corrective regimen sliding scale   SubCutaneous every 6 hours  levothyroxine 125 MICROGram(s) Oral daily  lidocaine   4% Patch 1 Patch Transdermal daily  melatonin 3 milliGRAM(s) Oral at bedtime  multivitamin/minerals/iron Oral Solution (CENTRUM) 15 milliLiter(s) Oral daily  nystatin Powder 1 Application(s) Topical every 8 hours  oxybutynin 5 milliGRAM(s) Oral daily  pantoprazole   Suspension 40 milliGRAM(s) Enteral Tube daily  petrolatum Ophthalmic Ointment 1 Application(s) Both EYES four times a day  predniSONE   Tablet 5 milliGRAM(s) Oral daily  QUEtiapine 12.5 milliGRAM(s) Oral <User Schedule>  QUEtiapine 12.5 milliGRAM(s) Oral <User Schedule>  senna Syrup 10 milliLiter(s) Oral at bedtime  simethicone 80 milliGRAM(s) Chew four times a day  sodium chloride 3%  Inhalation 4 milliLiter(s) Inhalation every 12 hours  triamcinolone 0.1% Ointment 1 Application(s) Topical two times a day  zinc oxide 40% Paste 1 Application(s) Topical daily  zinc sulfate 220 milliGRAM(s) Oral daily    MEDICATIONS  (PRN):  acetaminophen   Oral Liquid .. 1000 milliGRAM(s) Enteral Tube every 6 hours PRN Temp greater or equal to 38C (100.4F), Mild Pain (1 - 3)  diphenhydrAMINE Elixir 25 milliGRAM(s) Oral every 6 hours PRN Rash and/or Itching  oxyCODONE    Solution 2.5 milliGRAM(s) Oral every 6 hours PRN Moderate Pain (4 - 6)  sodium chloride 0.65% Nasal 1 Spray(s) Both Nostrils every 6 hours PRN Nasal Congestion      Vital Signs Last 24 Hrs  T(F): 98 (11-17-23 @ 13:10), Max: 99.4 (11-16-23 @ 17:48)  HR: 86 (11-17-23 @ 13:10) (64 - 101)  BP: 153/80 (11-17-23 @ 13:10) (126/54 - 173/79)  RR: 18 (11-17-23 @ 13:10) (16 - 18)  SpO2: 100% (11-17-23 @ 13:10) (99% - 100%)  Telemetry:   CAPILLARY BLOOD GLUCOSE      POCT Blood Glucose.: 265 mg/dL (17 Nov 2023 11:35)  POCT Blood Glucose.: 133 mg/dL (17 Nov 2023 05:53)  POCT Blood Glucose.: 197 mg/dL (17 Nov 2023 00:22)  POCT Blood Glucose.: 178 mg/dL (16 Nov 2023 17:45)    I&O's Summary    16 Nov 2023 07:01  -  17 Nov 2023 07:00  --------------------------------------------------------  IN: 1620 mL / OUT: 600 mL / NET: 1020 mL    17 Nov 2023 07:01  -  17 Nov 2023 16:43  --------------------------------------------------------  IN: 0 mL / OUT: 300 mL / NET: -300 mL        PHYSICAL EXAM:  GENERAL: NAD, well-developed  HEAD:  Atraumatic, Normocephalic  EYES: EOMI, PERRLA, conjunctiva and sclera clear  NECK: Supple, No JVD  CHEST/LUNG: Clear to auscultation bilaterally; No wheeze  HEART: Regular rate and rhythm; No murmurs, rubs, or gallops  ABDOMEN: Soft, Nontender, Nondistended; Bowel sounds present  EXTREMITIES:  2+ Peripheral Pulses, No clubbing, cyanosis, or edema  PSYCH: AAOx3  NEUROLOGY: non-focal  SKIN: No rashes or lesions    LABS:                        8.3    7.10  )-----------( 110      ( 16 Nov 2023 06:39 )             28.6     11-16    137  |  100  |  37<H>  ----------------------------<  123<H>  4.0   |  28  |  <0.30<L>    Ca    9.8      16 Nov 2023 06:39  Phos  4.1     11-16  Mg     2.0     11-16            Urinalysis Basic - ( 16 Nov 2023 06:39 )    Color: x / Appearance: x / SG: x / pH: x  Gluc: 123 mg/dL / Ketone: x  / Bili: x / Urobili: x   Blood: x / Protein: x / Nitrite: x   Leuk Esterase: x / RBC: x / WBC x   Sq Epi: x / Non Sq Epi: x / Bacteria: x        RADIOLOGY & ADDITIONAL TESTS:    Imaging Personally Reviewed:    Consultant(s) Notes Reviewed:      Care Discussed with Consultants/Other Providers:

## 2023-11-17 NOTE — PROGRESS NOTE ADULT - PROBLEM SELECTOR PLAN 3
Chronic Trach/ Vent dependant 2/2 Hypercapnic Resp Failure since 10/2022   -S/p Trach # 8 Cuffed Flex Shiley; trach change 8/18, PEG change 8/14 per records   - Continue Home vent settings: (300 TV/ RR 12/5 Peep/ Fio2 30%)  - Tolerates intermittent PSV 15/5 during day/ Vent HS  - Airway Clearance: Duoneb, Hypersal, Chest PT, PRN suctioning   - Maintain 02 sat > 92%  Tracheal Bleeding (Intermittent)-->resolved since admission   -S/p CT Angio neck: No evidence of active bleeding around tracheostomy site. No hematoma.  No collection   - Seen by ENT; Kath and b/l bronchi visualized without any trauma. small amount of blood tinged secretions resting at beginning of right bronchus not actively bleeding.  - 11/3 trach changed to #9 Portex by ENT

## 2023-11-17 NOTE — PROGRESS NOTE ADULT - PROBLEM SELECTOR PLAN 6
-Continue home Prednisone regimen  *fibromyalgia  -continue home Gabapentin 100mg daily  -continue home Fentanyl patches  - added Lidocaine patch   - Oxycodone 2.5mg q6 hrs PRN (in addition to Tylenol PRN) -Continue home Prednisone regimen  *fibromyalgia  -continue home Gabapentin 100mg daily  -continue home Fentanyl 25mcg Q72H patches  - added Lidocaine patch   - Oxycodone 2.5mg q6 hrs PRN (in addition to Tylenol PRN)

## 2023-11-17 NOTE — CONSULT NOTE ADULT - SUBJECTIVE AND OBJECTIVE BOX
ENT ISSUE/POD: trach tube change    HPI: 74 y/o F with PMHx of T2DM, HTN, HLD, anemia, hypothyroidism, RA, fibromyalgia, remote cerebral aneurysm repair, acute hypercapnic respiratory failure now with tracheostomy, PEG tube, and bedbound (trach change 8/18, PEG change 8/14), sacral pressure ulcer, BIBEMS from home for 6 day hx of bleeding from the tracheostomy and PEG tube with associated abdominal pain, recent history of auditory and visual hallucinations, and with chronic sacral decubitus ulcer. Previously seen by ENT on 11/1 and 11/3 for trach bleeding and trach change. ENT called back today as pt is not maintaining volumes on the vent and RT noticing air leak around trach tube. Saturating well, no difficulty breathing.         PAST MEDICAL & SURGICAL HISTORY:  Diabetes      Rheumatoid arthritis      Fibromyalgia      Hypothyroid      Hypertension      H/O tracheostomy      PEG (percutaneous endoscopic gastrostomy) status        Allergies    penicillin (Unknown)  heparin (Unknown)  Tagamet (Unknown)  pineapple (Unknown)  walnut (Unknown)  Pecan, Filbert, Hazelnut (Unknown)  Lyrica (Unknown)    Intolerances      MEDICATIONS  (STANDING):  albuterol/ipratropium for Nebulization 3 milliLiter(s) Nebulizer every 6 hours  artificial  tears Solution 2 Drop(s) Both EYES four times a day  ascorbic acid 500 milliGRAM(s) Oral daily  bacitracin   Ointment 1 Application(s) Topical two times a day  calcium carbonate    500 mG (Tums) Chewable 1 Tablet(s) Chew every 12 hours  cephalexin 500 milliGRAM(s) Oral every 6 hours  chlorhexidine 0.12% Liquid 15 milliLiter(s) Oral Mucosa every 12 hours  chlorhexidine 4% Liquid 1 Application(s) Topical <User Schedule>  ciprofloxacin     Tablet 500 milliGRAM(s) Oral every 12 hours  dextrose 50% Injectable 50 milliLiter(s) IV Push once  enoxaparin Injectable 40 milliGRAM(s) SubCutaneous every 24 hours  escitalopram 10 milliGRAM(s) Oral daily  fentaNYL   Patch  25 MICROgram(s)/Hr 1 Patch Transdermal every 72 hours  gabapentin Solution 100 milliGRAM(s) Enteral Tube at bedtime  insulin glargine Injectable (LANTUS) 14 Unit(s) SubCutaneous every morning  insulin lispro (ADMELOG) corrective regimen sliding scale   SubCutaneous every 6 hours  lactobacillus acidophilus 1 Tablet(s) Oral daily  levothyroxine 125 MICROGram(s) Oral daily  lidocaine   4% Patch 1 Patch Transdermal daily  melatonin 3 milliGRAM(s) Oral at bedtime  multivitamin/minerals/iron Oral Solution (CENTRUM) 15 milliLiter(s) Oral daily  nystatin Powder 1 Application(s) Topical every 8 hours  oxybutynin 5 milliGRAM(s) Oral daily  pantoprazole   Suspension 40 milliGRAM(s) Enteral Tube daily  petrolatum Ophthalmic Ointment 1 Application(s) Both EYES four times a day  predniSONE   Tablet 5 milliGRAM(s) Oral daily  QUEtiapine 12.5 milliGRAM(s) Oral <User Schedule>  QUEtiapine 12.5 milliGRAM(s) Oral <User Schedule>  simethicone 80 milliGRAM(s) Chew four times a day  sodium chloride 3%  Inhalation 4 milliLiter(s) Inhalation every 12 hours  triamcinolone 0.1% Ointment 1 Application(s) Topical two times a day  zinc oxide 40% Paste 1 Application(s) Topical daily  zinc sulfate 220 milliGRAM(s) Oral daily    MEDICATIONS  (PRN):  acetaminophen   Oral Liquid .. 1000 milliGRAM(s) Enteral Tube every 6 hours PRN Temp greater or equal to 38C (100.4F), Mild Pain (1 - 3)  diphenhydrAMINE Elixir 25 milliGRAM(s) Oral every 6 hours PRN Rash and/or Itching  oxyCODONE    Solution 2.5 milliGRAM(s) Oral every 6 hours PRN Moderate Pain (4 - 6)  sodium chloride 0.65% Nasal 1 Spray(s) Both Nostrils every 6 hours PRN Nasal Congestion      Social History: see consult    Family history: see consult    ROS:   unable to obtain due to pts clinical condition       Vital Signs Last 24 Hrs  T(C): 36.7 (17 Nov 2023 00:55), Max: 37.4 (16 Nov 2023 17:48)  T(F): 98 (17 Nov 2023 00:55), Max: 99.4 (16 Nov 2023 17:48)  HR: 64 (17 Nov 2023 03:25) (64 - 101)  BP: 126/54 (17 Nov 2023 00:55) (126/54 - 173/79)  BP(mean): --  RR: 16 (17 Nov 2023 00:55) (16 - 18)  SpO2: 100% (17 Nov 2023 03:25) (99% - 100%)    Parameters below as of 17 Nov 2023 00:55  Patient On (Oxygen Delivery Method): ventilator                              8.3    7.10  )-----------( 110      ( 16 Nov 2023 06:39 )             28.6    11-16    137  |  100  |  37<H>  ----------------------------<  123<H>  4.0   |  28  |  <0.30<L>    Ca    9.8      16 Nov 2023 06:39  Phos  4.1     11-16  Mg     2.0     11-16         PHYSICAL EXAM:  Gen: NAD  Skin: No rashes, bruises, or lesions  Head: Normocephalic, Atraumatic  Face: no edema, erythema, or fluctuance. Parotid glands soft without mass  Eyes: no scleral injection  Nose: Nares bilaterally patent, no discharge  Mouth: No Stridor / Drooling / Trismus.  Mucosa moist, tongue/uvula midline, oropharynx clear  Neck: #9 cuffed portex in place, cuff inflated, secured with velcro tie. Flat, supple, no lymphadenopathy, trachea midline, no masses  Lymphatic: No lymphadenopathy  Resp: on vent   Neuro: facial nerve intact, no facial droop      Tracheoscopy prior to trach change:  Bedside tracheoscopy performed, +tracheomalacia, +blood tinged secretions noted in B/L main stem bronchi, nick visualized, no purulence, no erythema. Pt tolerated the procedure well without complications.      Trach change:  Risks and benefits discussed with pt. Then, pt was placed in a supine position with neck extended. A 10 CC syringe was used to completely remove any remaining air in the trach cuff. #9 portex cuffed trach tube was removed and replaced with a #8 shiley distal XLT cuffed. Minimal bleeding. Blood tinged secretions suctioned from stoma. Bedside tracheoscopy performed, inck visualized, no purulence, no erythema. Pt tolerated the procedure well without complications.

## 2023-11-17 NOTE — PROGRESS NOTE ADULT - ASSESSMENT
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 Bleeding from gastrostomy.   improved    2. Leakage at PEG site.   no further leaking     3. Abdominal pain. CT negative for acute pathology.  miralax BID    4. Respiratory   per RCU

## 2023-11-17 NOTE — PROGRESS NOTE ADULT - NS ATTEND AMEND GEN_ALL_CORE FT
73F with a h/o DM, HTN, HLD, anemia, hypothyroidism, RA, fibromyalgia, remote cerebral aneurysm with repair, hypercapnic respiratory failure s/p tracheostomy/PEG, bedbound, sacral pressure ulcer. Admitted w/ bleeding from tracheostomy and PEG w/ associated abdominal pain, recent hx of auditory/visual hallucinations. Since admission, no further bleeding noted from either trach or PEG. Imaging showed mild thickening along PEG tube tract and sacral ulcer extending to coccyx suggestive of OM.     MRI with osteomyelitis  Repeat sputum now with serratia, increased secretions back on abx.  Improving delirium, patient awake and appropriately interacting this morning.     # Osteomyelitis  # Chronic hypoxemic RF  # oropharyngeal dysphagia  # acute on chronic pain  # Trach/Peg bleeding  # Tracheitis with PA and serratia  # Hx of hallucination, anxiety    - MRI showing sacral Osteo, on abx per ID, c/w wound care, no plans for surgery. Will need prolonged course of abx. Afebrile, cultures from 11/12 NGTD. Completed Cefepime on 11/15 for sputum culture with PA and serratia, now on Cipro and Keflex.  Will f/u with ID.   - c/w Airway clearance -  Duonebs and 3% Hypersal, chest PT.   - PS 10/5 during the day, vent at night, and will need vent on discharge  - family requested S/S eval but unable to given dependence on vent  - C/W pain control, multifactorial 2/2 to fibromyalgia as well as now osteo with pressure ulcer.    - Peg irritation seen by GI, no recs for further interventions. H/H stable  - Thrombocytopenia - improved   - Appreciate  follow up for delirium and hallucinations, patient calm and appropriate today - c/w Seroquel 12.5 mg at 3 pm and another 12.5 mg at 8 pm. Melatonin QHS for sleep. c/w Lexapro at current dose.  - Corneal erythema improved, no evidence of discharge, completed a course of Cipro eye drops, c/w artificial tears  - Appreciate derm consult for right back skin lesion- dermatofibroma, benign lesion. Mid back with irritated seborrheic keratosis, topical triamcinolone 0.1% bid   - DVT ppx- lovenox  - Dispo- full code.

## 2023-11-17 NOTE — PROGRESS NOTE ADULT - PROBLEM SELECTOR PLAN 8
Continue Home Lexapro 10mg daily   Psych consult appreciated  Continue Seroquel 12.5mg QD 3PM Continue Home Lexapro 10mg daily   Psych consult appreciated  Continue Seroquel 12.5mg BID

## 2023-11-17 NOTE — PROGRESS NOTE ADULT - PROBLEM SELECTOR PLAN 2
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   - S/p CT abd/pelvis (10/28): Sacral decubitus ulcer extending to the coccyx with osseous remodeling and pelvic MRI or bone scan to recc to exclude osteomyelitis.  - MRI pelvis 10/30 with Osteomyelitis, c/w current Cefepime for 7 days, Vancomycin d/marli   - Elevated, ESR 85   - Elevated,    - Wound care on board  -11/10:  Resumed Cipro 500mg q 12 and Keflex 500mg q 6 until 12/10.

## 2023-11-17 NOTE — PROGRESS NOTE ADULT - SUBJECTIVE AND OBJECTIVE BOX
INTERVAL HPI/OVERNIGHT EVENTS:    events noted   peg feeds            acetaminophen   Oral Liquid .. 1000 milliGRAM(s) Enteral Tube every 6 hours PRN  albuterol/ipratropium for Nebulization 3 milliLiter(s) Nebulizer every 6 hours PRN  artificial  tears Solution 2 Drop(s) Both EYES every 6 hours  ascorbic acid 500 milliGRAM(s) Oral daily  calcium carbonate    500 mG (Tums) Chewable 1 Tablet(s) Chew every 12 hours  cefepime   IVPB      cefepime   IVPB 2000 milliGRAM(s) IV Intermittent every 8 hours  chlorhexidine 0.12% Liquid 15 milliLiter(s) Oral Mucosa every 12 hours  LABS:                        8.3    7.10  )-----------( 110      ( 16 Nov 2023 06:39 )             28.6     11-16    137  |  100  |  37<H>  ----------------------------<  123<H>  4.0   |  28  |  <0.30<L>    Ca    9.8      16 Nov 2023 06:39  Phos  4.1     11-16  Mg     2.0     11-16        Urinalysis Basic - ( 16 Nov 2023 06:39 )    Color: x / Appearance: x / SG: x / pH: x  Gluc: 123 mg/dL / Ketone: x  / Bili: x / Urobili: x   Blood: x / Protein: x / Nitrite: x   Leuk Esterase: x / RBC: x / WBC x   Sq Epi: x / Non Sq Epi: x / Bacteria: x    chlorhexidine 4% Liquid 1 Application(s) Topical <User Schedule>  dextrose 50% Injectable 50 milliLiter(s) IV Push once  diphenhydrAMINE Elixir 25 milliGRAM(s) Oral every 6 hours PRN  enoxaparin Injectable 40 milliGRAM(s) SubCutaneous every 24 hours  escitalopram 10 milliGRAM(s) Oral daily  fentaNYL   Patch  25 MICROgram(s)/Hr 1 Patch Transdermal every 72 hours  gabapentin Solution 100 milliGRAM(s) Enteral Tube at bedtime  insulin glargine Injectable (LANTUS) 14 Unit(s) SubCutaneous every morning  insulin lispro (ADMELOG) corrective regimen sliding scale   SubCutaneous every 6 hours  lactobacillus acidophilus 1 Tablet(s) Oral daily  levothyroxine 100 MICROGram(s) Enteral Tube <User Schedule>  lidocaine   4% Patch 1 Patch Transdermal daily  multivitamin/minerals/iron Oral Solution (CENTRUM) 15 milliLiter(s) Oral daily  nystatin Powder 1 Application(s) Topical every 8 hours  oxybutynin 5 milliGRAM(s) Oral daily  oxyCODONE    Solution 2.5 milliGRAM(s) Oral every 6 hours PRN  pantoprazole   Suspension 40 milliGRAM(s) Enteral Tube daily  polyethylene glycol 3350 17 Gram(s) Oral two times a day  predniSONE   Tablet 5 milliGRAM(s) Oral daily  QUEtiapine 12.5 milliGRAM(s) Oral <User Schedule>  simethicone 80 milliGRAM(s) Chew four times a day  sodium chloride 0.65% Nasal 1 Spray(s) Both Nostrils every 6 hours PRN  zinc sulfate 220 milliGRAM(s) Oral daily           Review of Systems:  unable to obtain     PHYSICAL EXAM:    Constitutional: NAD  HEENT: EOMI, throat clear  Neck: No LAD, supple  Respiratory: CTA and P  Cardiovascular: S1 and S2, RRR, no M  Gastrointestinal: BS+, soft, NT/ND, neg HSM, +peg c/d/i  Extremities: No peripheral edema, neg clubbing, cyanosis  Vascular: 2+ peripheral pulses  Neurological: A/O   Psychiatric: Normal mood, normal affect  Skin: No rashes    Vital Signs Last 24 Hrs  T(C): 36.7 (17 Nov 2023 13:10), Max: 37.4 (16 Nov 2023 17:48)  T(F): 98 (17 Nov 2023 13:10), Max: 99.4 (16 Nov 2023 17:48)  HR: 86 (17 Nov 2023 13:10) (64 - 101)  BP: 153/80 (17 Nov 2023 13:10) (126/54 - 173/79)  BP(mean): --  RR: 18 (17 Nov 2023 13:10) (16 - 18)  SpO2: 100% (17 Nov 2023 13:10) (99% - 100%)    Parameters below as of 17 Nov 2023 13:10  Patient On (Oxygen Delivery Method): ventilator

## 2023-11-17 NOTE — CONSULT NOTE ADULT - ASSESSMENT
72 y/o F with PMHx of T2DM, HTN, HLD, anemia, hypothyroidism, RA, fibromyalgia, remote cerebral aneurysm repair, acute hypercapnic respiratory failure now with tracheostomy. BIBEMS from home for 6 day hx of bleeding from the tracheostomy and PEG tube. Previously seen by ENT on 11/1 and 11/3 for trach bleeding and trach change. ENT called back today as pt is not maintaining volumes on the vent and RT noticing air leak around trach tube. Saturating well, no difficulty breathing. Tracheoscopy performed prior to trach change showing tracheomalacia and blood tinged secretions in b/l main stem bronchi. Trach was then changed from #9 cuffed portex to #8 shiley distal XLT cuffed. Pt tolerated procedure well, no complications.

## 2023-11-17 NOTE — PROGRESS NOTE ADULT - SUBJECTIVE AND OBJECTIVE BOX
Patient is a 73y old  Female who presents with a chief complaint of peg tube (16 Nov 2023 06:51)      Interval Events:    REVIEW OF SYSTEMS:  [ ] Positive  [ ] All other systems negative  [ ] Unable to assess ROS because ________    Vital Signs Last 24 Hrs  T(C): 36.7 (11-17-23 @ 00:55), Max: 37.4 (11-16-23 @ 17:48)  T(F): 98 (11-17-23 @ 00:55), Max: 99.4 (11-16-23 @ 17:48)  HR: 95 (11-17-23 @ 06:54) (64 - 101)  BP: 126/54 (11-17-23 @ 00:55) (126/54 - 173/79)  RR: 16 (11-17-23 @ 00:55) (16 - 18)  SpO2: 100% (11-17-23 @ 06:54) (99% - 100%)    PHYSICAL EXAM:  HEENT:   [ ]Tracheostomy:  [ ]Pupils equal  [ ]No oral lesions  [ ]Abnormal    SKIN  [ ]No Rash  [ ] Abnormal  [ ] pressure    CARDIAC  [ ]Regular  [ ]Abnormal    PULMONARY  [ ]Bilateral Clear Breath Sounds  [ ]Normal Excursion  [ ]Abnormal    GI  [ ]PEG      [ ] +BS		              [ ]Soft, nondistended, nontender	  [ ]Abnormal    MUSCULOSKELETAL                                   [ ]Bedbound                 [ ]Abnormal    [ ]Ambulatory/OOB to chair                           EXTREMITIES                                         [ ]Normal  [ ]Edema                           NEUROLOGIC  [ ] Normal, non focal  [ ] Focal findings:    PSYCHIATRIC  [ ]Alert and appropriate  [ ] Sedated	 [ ]Agitated    :  Mcbride: [ ] Yes, if yes: Date of Placement:                   [  ] No    LINES: Central Lines [ ] Yes, if yes: Date of Placement                                     [  ] No    HOSPITAL MEDICATIONS:  MEDICATIONS  (STANDING):  albuterol/ipratropium for Nebulization 3 milliLiter(s) Nebulizer every 6 hours  artificial  tears Solution 2 Drop(s) Both EYES four times a day  ascorbic acid 500 milliGRAM(s) Oral daily  bacitracin   Ointment 1 Application(s) Topical two times a day  calcium carbonate    500 mG (Tums) Chewable 1 Tablet(s) Chew every 12 hours  cephalexin 500 milliGRAM(s) Oral every 6 hours  chlorhexidine 0.12% Liquid 15 milliLiter(s) Oral Mucosa every 12 hours  chlorhexidine 4% Liquid 1 Application(s) Topical <User Schedule>  ciprofloxacin     Tablet 500 milliGRAM(s) Oral every 12 hours  dextrose 50% Injectable 50 milliLiter(s) IV Push once  enoxaparin Injectable 40 milliGRAM(s) SubCutaneous every 24 hours  escitalopram 10 milliGRAM(s) Oral daily  fentaNYL   Patch  25 MICROgram(s)/Hr 1 Patch Transdermal every 72 hours  gabapentin Solution 100 milliGRAM(s) Enteral Tube at bedtime  insulin glargine Injectable (LANTUS) 14 Unit(s) SubCutaneous every morning  insulin lispro (ADMELOG) corrective regimen sliding scale   SubCutaneous every 6 hours  lactobacillus acidophilus 1 Tablet(s) Oral daily  levothyroxine 125 MICROGram(s) Oral daily  lidocaine   4% Patch 1 Patch Transdermal daily  melatonin 3 milliGRAM(s) Oral at bedtime  multivitamin/minerals/iron Oral Solution (CENTRUM) 15 milliLiter(s) Oral daily  nystatin Powder 1 Application(s) Topical every 8 hours  oxybutynin 5 milliGRAM(s) Oral daily  pantoprazole   Suspension 40 milliGRAM(s) Enteral Tube daily  petrolatum Ophthalmic Ointment 1 Application(s) Both EYES four times a day  predniSONE   Tablet 5 milliGRAM(s) Oral daily  QUEtiapine 12.5 milliGRAM(s) Oral <User Schedule>  QUEtiapine 12.5 milliGRAM(s) Oral <User Schedule>  simethicone 80 milliGRAM(s) Chew four times a day  sodium chloride 3%  Inhalation 4 milliLiter(s) Inhalation every 12 hours  triamcinolone 0.1% Ointment 1 Application(s) Topical two times a day  zinc oxide 40% Paste 1 Application(s) Topical daily  zinc sulfate 220 milliGRAM(s) Oral daily    MEDICATIONS  (PRN):  acetaminophen   Oral Liquid .. 1000 milliGRAM(s) Enteral Tube every 6 hours PRN Temp greater or equal to 38C (100.4F), Mild Pain (1 - 3)  diphenhydrAMINE Elixir 25 milliGRAM(s) Oral every 6 hours PRN Rash and/or Itching  oxyCODONE    Solution 2.5 milliGRAM(s) Oral every 6 hours PRN Moderate Pain (4 - 6)  sodium chloride 0.65% Nasal 1 Spray(s) Both Nostrils every 6 hours PRN Nasal Congestion      LABS:                        8.3    7.10  )-----------( 110      ( 16 Nov 2023 06:39 )             28.6     11-16    137  |  100  |  37<H>  ----------------------------<  123<H>  4.0   |  28  |  <0.30<L>    Ca    9.8      16 Nov 2023 06:39  Phos  4.1     11-16  Mg     2.0     11-16        Urinalysis Basic - ( 16 Nov 2023 06:39 )    Color: x / Appearance: x / SG: x / pH: x  Gluc: 123 mg/dL / Ketone: x  / Bili: x / Urobili: x   Blood: x / Protein: x / Nitrite: x   Leuk Esterase: x / RBC: x / WBC x   Sq Epi: x / Non Sq Epi: x / Bacteria: x          CAPILLARY BLOOD GLUCOSE    MICROBIOLOGY:     RADIOLOGY:  [ ] Reviewed and interpreted by me    Mode: AC/ CMV (Assist Control/ Continuous Mandatory Ventilation)  RR (machine): 12  TV (machine): 300  FiO2: 30  PEEP: 5  ITime: 1  MAP: 10  PIP: 23   Patient is a 73y old  Female who presents with a chief complaint of peg tube (16 Nov 2023 06:51)      Interval Events: No events over night    REVIEW OF SYSTEMS:  [ ] Positive  [X] All other systems negative  [  Unable to assess ROS because _______    Vital Signs Last 24 Hrs  T(C): 36.7 (11-17-23 @ 00:55), Max: 37.4 (11-16-23 @ 17:48)  T(F): 98 (11-17-23 @ 00:55), Max: 99.4 (11-16-23 @ 17:48)  HR: 95 (11-17-23 @ 06:54) (64 - 101)  BP: 126/54 (11-17-23 @ 00:55) (126/54 - 173/79)  RR: 16 (11-17-23 @ 00:55) (16 - 18)  SpO2: 100% (11-17-23 @ 06:54) (99% - 100%)      PHYSICAL EXAM:  HEENT:   [X] Tracheostomy:  #9 Cuffed Portex  [X] 4mm PERRL B/L  [X] No oral lesions  [ ] Abnormal    SKIN  [ ] No Rash  [ ]  Abnormal  [X] pressure: Sacral wound    CARDIAC  [X] Regular  [ ] Abnormal    PULMONARY  [X] Bilateral Clear Breath Sounds  [ ] Normal Excursion  [ ] Abnormal    GI  [X] PEG      [X] +BS		              [X] Soft, nondistended, nontender	  [ ] Abnormal    MUSCULOSKELETAL                                   [  ] Bedbound                 [X] Abnormal:  Deconditioned but can partially move all limbs   [ ] Ambulatory/OOB to chair                           EXTREMITIES                                         [X] Normal  [ ]Edema                           NEUROLOGIC  [ ] Normal, non focal  [X] Focal findings:  Alert and Oriented x2; responds appropriately top questions by mouthing and able to phonate while on vent; B/L foot drop with partial ability to dorsiflex; moves both arms with minimal hand dexterity B/L    PSYCHIATRIC  [X] Alert; somewhat anxious at times but mostly appropriate  [ ] Sedated	 [ ]Agitated    :  Reed: [ ] Yes, if yes: Date of Placement:                   [X] No    LINES: Central Lines [ ] Yes, if yes: Date of Placement                                     [X] No      HOSPITAL MEDICATIONS:  MEDICATIONS  (STANDING):  albuterol/ipratropium for Nebulization 3 milliLiter(s) Nebulizer every 6 hours  artificial  tears Solution 2 Drop(s) Both EYES four times a day  ascorbic acid 500 milliGRAM(s) Oral daily  bacitracin   Ointment 1 Application(s) Topical two times a day  calcium carbonate    500 mG (Tums) Chewable 1 Tablet(s) Chew every 12 hours  cephalexin 500 milliGRAM(s) Oral every 6 hours  chlorhexidine 0.12% Liquid 15 milliLiter(s) Oral Mucosa every 12 hours  chlorhexidine 4% Liquid 1 Application(s) Topical <User Schedule>  ciprofloxacin     Tablet 500 milliGRAM(s) Oral every 12 hours  dextrose 50% Injectable 50 milliLiter(s) IV Push once  enoxaparin Injectable 40 milliGRAM(s) SubCutaneous every 24 hours  escitalopram 10 milliGRAM(s) Oral daily  fentaNYL   Patch  25 MICROgram(s)/Hr 1 Patch Transdermal every 72 hours  gabapentin Solution 100 milliGRAM(s) Enteral Tube at bedtime  insulin glargine Injectable (LANTUS) 14 Unit(s) SubCutaneous every morning  insulin lispro (ADMELOG) corrective regimen sliding scale   SubCutaneous every 6 hours  lactobacillus acidophilus 1 Tablet(s) Oral daily  levothyroxine 125 MICROGram(s) Oral daily  lidocaine   4% Patch 1 Patch Transdermal daily  melatonin 3 milliGRAM(s) Oral at bedtime  multivitamin/minerals/iron Oral Solution (CENTRUM) 15 milliLiter(s) Oral daily  nystatin Powder 1 Application(s) Topical every 8 hours  oxybutynin 5 milliGRAM(s) Oral daily  pantoprazole   Suspension 40 milliGRAM(s) Enteral Tube daily  petrolatum Ophthalmic Ointment 1 Application(s) Both EYES four times a day  predniSONE   Tablet 5 milliGRAM(s) Oral daily  QUEtiapine 12.5 milliGRAM(s) Oral <User Schedule>  QUEtiapine 12.5 milliGRAM(s) Oral <User Schedule>  simethicone 80 milliGRAM(s) Chew four times a day  sodium chloride 3%  Inhalation 4 milliLiter(s) Inhalation every 12 hours  triamcinolone 0.1% Ointment 1 Application(s) Topical two times a day  zinc oxide 40% Paste 1 Application(s) Topical daily  zinc sulfate 220 milliGRAM(s) Oral daily    MEDICATIONS  (PRN):  acetaminophen   Oral Liquid .. 1000 milliGRAM(s) Enteral Tube every 6 hours PRN Temp greater or equal to 38C (100.4F), Mild Pain (1 - 3)  diphenhydrAMINE Elixir 25 milliGRAM(s) Oral every 6 hours PRN Rash and/or Itching  oxyCODONE    Solution 2.5 milliGRAM(s) Oral every 6 hours PRN Moderate Pain (4 - 6)  sodium chloride 0.65% Nasal 1 Spray(s) Both Nostrils every 6 hours PRN Nasal Congestion      LABS:                 Urinalysis Basic - ( 16 Nov 2023 06:39 )    Color: x / Appearance: x / SG: x / pH: x  Gluc: 123 mg/dL / Ketone: x  / Bili: x / Urobili: x   Blood: x / Protein: x / Nitrite: x   Leuk Esterase: x / RBC: x / WBC x   Sq Epi: x / Non Sq Epi: x / Bacteria: x          CAPILLARY BLOOD GLUCOSE    MICROBIOLOGY:     RADIOLOGY:  [ ] Reviewed and interpreted by me    Mode: AC/ CMV (Assist Control/ Continuous Mandatory Ventilation)  RR (machine): 12  TV (machine): 300  FiO2: 30  PEEP: 5  ITime: 1  MAP: 10  PIP: 23

## 2023-11-17 NOTE — PROGRESS NOTE ADULT - ASSESSMENT
72 y/o F with PMHx of T2DM, HTN, HLD, anemia, hypothyroidism, RA, fibromyalgia, remote cerebral aneurysm repair, acute hypercapnic respiratory failure now with tracheostomy, PEG tube, and bedbound (trach change 8/18, PEG change 8/14), sacral pressure ulcer, BIBEMS from home for 6 day hx of bleeding from the tracheostomy and PEG tube with associated abdominal pain, recent history of auditory and visual hallucinations, and with chronic sacral decubitus ulcer. Exam notable for mild abdominal distention with LLQ and RLQ tenderness, tracheostomy in place with no obvious bleeding, PEG tube with scant amount of dried blood, at baseline neurologic status. Imaging showing no active bleeding around tracheostomy site, however was notable for mild thickening along PEG tube tract, sacral ulcer extending to the coccyx suggestive of possible osteomyelitis, and bibasilar patchy lung opacities with volume loss. Patient admitted for respiratory support, wound care of tracheostomy and PEG, and management of sacral osteomyelitis. No further Trach and peg site  bleeding noted since admission.  Pelvic MRI imaging also suggestive of Acute OM. Patient placed on long-term antibiotics with Infectious disease guidance.  Additionally treated with a 7d course of Cefepime due to PSA and Serratia tracheitis. Once completed,  plan to change abx back to Cipro 500mg q 12 and Keflex 500mg q 6 to complete a total of 6 weeks. Hospital course c/b Delirium for which Seroquel was added and home Lexapro continued. Tracheostomy changed to #9 Cuffed Portex by ENT 11/3.     11/15: Last Day of Cefepime, Thrombocytopenia stable. Resume oral abx tomorrow (11/16) for completion of OM treatment with Cipro 500mg q 12 and Keflex 500mg q 6 until 12/10. Small bleeding noted at stoma site, h/h remains stable, GI ask to see if any intervention.   11/16:  Fever of 100.6F over night noted.  Resuming Cipro and Keflex for osteomyelitis treatment.  Patient attempted PS 15/5 however poorly tolerated with apnea.   in discussions with daughter in regards to disposition to skilled nursing facility.            74 y/o F with PMHx of T2DM, HTN, HLD, anemia, hypothyroidism, RA, fibromyalgia, remote cerebral aneurysm repair, acute hypercapnic respiratory failure now with tracheostomy, PEG tube, and bedbound (trach change 8/18, PEG change 8/14), sacral pressure ulcer, BIBEMS from home for 6 day hx of bleeding from the tracheostomy and PEG tube with associated abdominal pain, recent history of auditory and visual hallucinations, and with chronic sacral decubitus ulcer. Exam notable for mild abdominal distention with LLQ and RLQ tenderness, tracheostomy in place with no obvious bleeding, PEG tube with scant amount of dried blood, at baseline neurologic status. Imaging showing no active bleeding around tracheostomy site, however was notable for mild thickening along PEG tube tract, sacral ulcer extending to the coccyx suggestive of possible osteomyelitis, and bibasilar patchy lung opacities with volume loss. Patient admitted for respiratory support, wound care of tracheostomy and PEG, and management of sacral osteomyelitis. No further Trach and peg site  bleeding noted since admission.  Pelvic MRI imaging also suggestive of Acute OM. Patient placed on long-term antibiotics with Infectious disease guidance.  Additionally treated with a 7d course of Cefepime due to PSA and Serratia tracheitis. Once completed,  plan to change abx back to Cipro 500mg q 12 and Keflex 500mg q 6 to complete a total of 6 weeks. Hospital course c/b Delirium for which Seroquel was added and home Lexapro continued. Tracheostomy changed to #9 Cuffed Portex by ENT 11/3.     11/15: Last Day of Cefepime, Thrombocytopenia stable. Resume oral abx tomorrow (11/16) for completion of OM treatment with Cipro 500mg q 12 and Keflex 500mg q 6 until 12/10. Small bleeding noted at stoma site, h/h remains stable, GI ask to see if any intervention.   11/16:  Fever of 100.6F over night noted.  Resuming Cipro and Keflex for osteomyelitis treatment.  Patient attempted PS 15/5 however poorly tolerated with apnea.   in discussions with daughter in regards to disposition to skilled nursing facility.   11/17: No acute events.  Tolerated PS 15/5 for most of day until patient requested full vent support for a nap.

## 2023-11-18 LAB
ALBUMIN SERPL ELPH-MCNC: 3.3 G/DL — SIGNIFICANT CHANGE UP (ref 3.3–5)
ALBUMIN SERPL ELPH-MCNC: 3.3 G/DL — SIGNIFICANT CHANGE UP (ref 3.3–5)
ALP SERPL-CCNC: 48 U/L — SIGNIFICANT CHANGE UP (ref 40–120)
ALP SERPL-CCNC: 48 U/L — SIGNIFICANT CHANGE UP (ref 40–120)
ALT FLD-CCNC: 18 U/L — SIGNIFICANT CHANGE UP (ref 10–45)
ALT FLD-CCNC: 18 U/L — SIGNIFICANT CHANGE UP (ref 10–45)
AST SERPL-CCNC: 19 U/L — SIGNIFICANT CHANGE UP (ref 10–40)
AST SERPL-CCNC: 19 U/L — SIGNIFICANT CHANGE UP (ref 10–40)
BASE EXCESS BLDA CALC-SCNC: 1.5 MMOL/L — SIGNIFICANT CHANGE UP (ref -2–3)
BASE EXCESS BLDA CALC-SCNC: 1.5 MMOL/L — SIGNIFICANT CHANGE UP (ref -2–3)
BASE EXCESS BLDA CALC-SCNC: 5.3 MMOL/L — HIGH (ref -2–3)
BASE EXCESS BLDA CALC-SCNC: 5.3 MMOL/L — HIGH (ref -2–3)
BILIRUB SERPL-MCNC: 0.3 MG/DL — SIGNIFICANT CHANGE UP (ref 0.2–1.2)
BILIRUB SERPL-MCNC: 0.3 MG/DL — SIGNIFICANT CHANGE UP (ref 0.2–1.2)
BUN SERPL-MCNC: 29 MG/DL — HIGH (ref 7–23)
BUN SERPL-MCNC: 29 MG/DL — HIGH (ref 7–23)
CALCIUM SERPL-MCNC: 10 MG/DL — SIGNIFICANT CHANGE UP (ref 8.4–10.5)
CALCIUM SERPL-MCNC: 10 MG/DL — SIGNIFICANT CHANGE UP (ref 8.4–10.5)
CHLORIDE SERPL-SCNC: 99 MMOL/L — SIGNIFICANT CHANGE UP (ref 96–108)
CHLORIDE SERPL-SCNC: 99 MMOL/L — SIGNIFICANT CHANGE UP (ref 96–108)
CO2 BLDA-SCNC: 33 MMOL/L — HIGH (ref 19–24)
CO2 BLDA-SCNC: 33 MMOL/L — HIGH (ref 19–24)
CO2 BLDA-SCNC: 34 MMOL/L — HIGH (ref 19–24)
CO2 BLDA-SCNC: 34 MMOL/L — HIGH (ref 19–24)
CO2 SERPL-SCNC: 28 MMOL/L — SIGNIFICANT CHANGE UP (ref 22–31)
CO2 SERPL-SCNC: 28 MMOL/L — SIGNIFICANT CHANGE UP (ref 22–31)
CREAT SERPL-MCNC: <0.3 MG/DL — LOW (ref 0.5–1.3)
CREAT SERPL-MCNC: <0.3 MG/DL — LOW (ref 0.5–1.3)
EGFR: 113 ML/MIN/1.73M2 — SIGNIFICANT CHANGE UP
EGFR: 113 ML/MIN/1.73M2 — SIGNIFICANT CHANGE UP
GAS PNL BLDA: SIGNIFICANT CHANGE UP
GAS PNL BLDA: SIGNIFICANT CHANGE UP
GLUCOSE BLDC GLUCOMTR-MCNC: 149 MG/DL — HIGH (ref 70–99)
GLUCOSE BLDC GLUCOMTR-MCNC: 149 MG/DL — HIGH (ref 70–99)
GLUCOSE BLDC GLUCOMTR-MCNC: 173 MG/DL — HIGH (ref 70–99)
GLUCOSE BLDC GLUCOMTR-MCNC: 173 MG/DL — HIGH (ref 70–99)
GLUCOSE BLDC GLUCOMTR-MCNC: 186 MG/DL — HIGH (ref 70–99)
GLUCOSE BLDC GLUCOMTR-MCNC: 186 MG/DL — HIGH (ref 70–99)
GLUCOSE BLDC GLUCOMTR-MCNC: 214 MG/DL — HIGH (ref 70–99)
GLUCOSE BLDC GLUCOMTR-MCNC: 214 MG/DL — HIGH (ref 70–99)
GLUCOSE BLDC GLUCOMTR-MCNC: 223 MG/DL — HIGH (ref 70–99)
GLUCOSE BLDC GLUCOMTR-MCNC: 223 MG/DL — HIGH (ref 70–99)
GLUCOSE SERPL-MCNC: 224 MG/DL — HIGH (ref 70–99)
GLUCOSE SERPL-MCNC: 224 MG/DL — HIGH (ref 70–99)
HCO3 BLDA-SCNC: 31 MMOL/L — HIGH (ref 21–28)
HCO3 BLDA-SCNC: 31 MMOL/L — HIGH (ref 21–28)
HCO3 BLDA-SCNC: 32 MMOL/L — HIGH (ref 21–28)
HCO3 BLDA-SCNC: 32 MMOL/L — HIGH (ref 21–28)
HCT VFR BLD CALC: 30.4 % — LOW (ref 34.5–45)
HCT VFR BLD CALC: 30.4 % — LOW (ref 34.5–45)
HGB BLD-MCNC: 8.9 G/DL — LOW (ref 11.5–15.5)
HGB BLD-MCNC: 8.9 G/DL — LOW (ref 11.5–15.5)
HOROWITZ INDEX BLDA+IHG-RTO: 100 — SIGNIFICANT CHANGE UP
HOROWITZ INDEX BLDA+IHG-RTO: 100 — SIGNIFICANT CHANGE UP
MAGNESIUM SERPL-MCNC: 1.8 MG/DL — SIGNIFICANT CHANGE UP (ref 1.6–2.6)
MAGNESIUM SERPL-MCNC: 1.8 MG/DL — SIGNIFICANT CHANGE UP (ref 1.6–2.6)
MCHC RBC-ENTMCNC: 27.6 PG — SIGNIFICANT CHANGE UP (ref 27–34)
MCHC RBC-ENTMCNC: 27.6 PG — SIGNIFICANT CHANGE UP (ref 27–34)
MCHC RBC-ENTMCNC: 29.3 GM/DL — LOW (ref 32–36)
MCHC RBC-ENTMCNC: 29.3 GM/DL — LOW (ref 32–36)
MCV RBC AUTO: 94.1 FL — SIGNIFICANT CHANGE UP (ref 80–100)
MCV RBC AUTO: 94.1 FL — SIGNIFICANT CHANGE UP (ref 80–100)
NRBC # BLD: 0 /100 WBCS — SIGNIFICANT CHANGE UP (ref 0–0)
NRBC # BLD: 0 /100 WBCS — SIGNIFICANT CHANGE UP (ref 0–0)
PCO2 BLDA: 63 MMHG — HIGH (ref 32–45)
PCO2 BLDA: 63 MMHG — HIGH (ref 32–45)
PCO2 BLDA: 70 MMHG — CRITICAL HIGH (ref 32–45)
PCO2 BLDA: 70 MMHG — CRITICAL HIGH (ref 32–45)
PH BLDA: 7.25 — LOW (ref 7.35–7.45)
PH BLDA: 7.25 — LOW (ref 7.35–7.45)
PH BLDA: 7.32 — LOW (ref 7.35–7.45)
PH BLDA: 7.32 — LOW (ref 7.35–7.45)
PLATELET # BLD AUTO: 124 K/UL — LOW (ref 150–400)
PLATELET # BLD AUTO: 124 K/UL — LOW (ref 150–400)
PO2 BLDA: 121 MMHG — HIGH (ref 83–108)
PO2 BLDA: 121 MMHG — HIGH (ref 83–108)
PO2 BLDA: 40 MMHG — CRITICAL LOW (ref 83–108)
PO2 BLDA: 40 MMHG — CRITICAL LOW (ref 83–108)
POTASSIUM SERPL-MCNC: 4.1 MMOL/L — SIGNIFICANT CHANGE UP (ref 3.5–5.3)
POTASSIUM SERPL-MCNC: 4.1 MMOL/L — SIGNIFICANT CHANGE UP (ref 3.5–5.3)
POTASSIUM SERPL-SCNC: 4.1 MMOL/L — SIGNIFICANT CHANGE UP (ref 3.5–5.3)
POTASSIUM SERPL-SCNC: 4.1 MMOL/L — SIGNIFICANT CHANGE UP (ref 3.5–5.3)
PROT SERPL-MCNC: 7.4 G/DL — SIGNIFICANT CHANGE UP (ref 6–8.3)
PROT SERPL-MCNC: 7.4 G/DL — SIGNIFICANT CHANGE UP (ref 6–8.3)
RBC # BLD: 3.23 M/UL — LOW (ref 3.8–5.2)
RBC # BLD: 3.23 M/UL — LOW (ref 3.8–5.2)
RBC # FLD: 18.3 % — HIGH (ref 10.3–14.5)
RBC # FLD: 18.3 % — HIGH (ref 10.3–14.5)
SAO2 % BLDA: 63.9 % — LOW (ref 94–98)
SAO2 % BLDA: 63.9 % — LOW (ref 94–98)
SAO2 % BLDA: 99.6 % — HIGH (ref 94–98)
SAO2 % BLDA: 99.6 % — HIGH (ref 94–98)
SODIUM SERPL-SCNC: 137 MMOL/L — SIGNIFICANT CHANGE UP (ref 135–145)
SODIUM SERPL-SCNC: 137 MMOL/L — SIGNIFICANT CHANGE UP (ref 135–145)
WBC # BLD: 9.19 K/UL — SIGNIFICANT CHANGE UP (ref 3.8–10.5)
WBC # BLD: 9.19 K/UL — SIGNIFICANT CHANGE UP (ref 3.8–10.5)
WBC # FLD AUTO: 9.19 K/UL — SIGNIFICANT CHANGE UP (ref 3.8–10.5)
WBC # FLD AUTO: 9.19 K/UL — SIGNIFICANT CHANGE UP (ref 3.8–10.5)

## 2023-11-18 PROCEDURE — 99233 SBSQ HOSP IP/OBS HIGH 50: CPT | Mod: FS

## 2023-11-18 RX ORDER — ONDANSETRON 8 MG/1
4 TABLET, FILM COATED ORAL ONCE
Refills: 0 | Status: COMPLETED | OUTPATIENT
Start: 2023-11-18 | End: 2023-11-18

## 2023-11-18 RX ADMIN — PANTOPRAZOLE SODIUM 40 MILLIGRAM(S): 20 TABLET, DELAYED RELEASE ORAL at 12:33

## 2023-11-18 RX ADMIN — ENOXAPARIN SODIUM 40 MILLIGRAM(S): 100 INJECTION SUBCUTANEOUS at 05:53

## 2023-11-18 RX ADMIN — Medication 1 APPLICATION(S): at 19:04

## 2023-11-18 RX ADMIN — Medication 4: at 12:41

## 2023-11-18 RX ADMIN — GABAPENTIN 100 MILLIGRAM(S): 400 CAPSULE ORAL at 21:04

## 2023-11-18 RX ADMIN — SIMETHICONE 80 MILLIGRAM(S): 80 TABLET, CHEWABLE ORAL at 18:46

## 2023-11-18 RX ADMIN — Medication 500 MILLIGRAM(S): at 05:54

## 2023-11-18 RX ADMIN — Medication 15 MILLILITER(S): at 12:33

## 2023-11-18 RX ADMIN — ONDANSETRON 4 MILLIGRAM(S): 8 TABLET, FILM COATED ORAL at 10:58

## 2023-11-18 RX ADMIN — Medication 5 MILLIGRAM(S): at 12:33

## 2023-11-18 RX ADMIN — Medication 2 DROP(S): at 18:50

## 2023-11-18 RX ADMIN — QUETIAPINE FUMARATE 12.5 MILLIGRAM(S): 200 TABLET, FILM COATED ORAL at 14:17

## 2023-11-18 RX ADMIN — Medication 2 DROP(S): at 00:39

## 2023-11-18 RX ADMIN — SIMETHICONE 80 MILLIGRAM(S): 80 TABLET, CHEWABLE ORAL at 00:37

## 2023-11-18 RX ADMIN — Medication 1 APPLICATION(S): at 18:53

## 2023-11-18 RX ADMIN — CHLORHEXIDINE GLUCONATE 1 APPLICATION(S): 213 SOLUTION TOPICAL at 05:55

## 2023-11-18 RX ADMIN — ZINC SULFATE TAB 220 MG (50 MG ZINC EQUIVALENT) 220 MILLIGRAM(S): 220 (50 ZN) TAB at 12:33

## 2023-11-18 RX ADMIN — NYSTATIN CREAM 1 APPLICATION(S): 100000 CREAM TOPICAL at 05:55

## 2023-11-18 RX ADMIN — Medication 3 MILLILITER(S): at 05:37

## 2023-11-18 RX ADMIN — NYSTATIN CREAM 1 APPLICATION(S): 100000 CREAM TOPICAL at 14:12

## 2023-11-18 RX ADMIN — SIMETHICONE 80 MILLIGRAM(S): 80 TABLET, CHEWABLE ORAL at 12:34

## 2023-11-18 RX ADMIN — Medication 500 MILLIGRAM(S): at 12:34

## 2023-11-18 RX ADMIN — Medication 1 TABLET(S): at 18:50

## 2023-11-18 RX ADMIN — Medication 3 MILLILITER(S): at 11:32

## 2023-11-18 RX ADMIN — Medication 4: at 00:38

## 2023-11-18 RX ADMIN — Medication 1 APPLICATION(S): at 05:56

## 2023-11-18 RX ADMIN — Medication 1 APPLICATION(S): at 05:55

## 2023-11-18 RX ADMIN — SIMETHICONE 80 MILLIGRAM(S): 80 TABLET, CHEWABLE ORAL at 05:54

## 2023-11-18 RX ADMIN — NYSTATIN CREAM 1 APPLICATION(S): 100000 CREAM TOPICAL at 21:04

## 2023-11-18 RX ADMIN — SODIUM CHLORIDE 4 MILLILITER(S): 9 INJECTION INTRAMUSCULAR; INTRAVENOUS; SUBCUTANEOUS at 17:41

## 2023-11-18 RX ADMIN — Medication 3 MILLILITER(S): at 23:13

## 2023-11-18 RX ADMIN — CHLORHEXIDINE GLUCONATE 15 MILLILITER(S): 213 SOLUTION TOPICAL at 05:55

## 2023-11-18 RX ADMIN — OXYCODONE HYDROCHLORIDE 2.5 MILLIGRAM(S): 5 TABLET ORAL at 15:30

## 2023-11-18 RX ADMIN — SODIUM CHLORIDE 4 MILLILITER(S): 9 INJECTION INTRAMUSCULAR; INTRAVENOUS; SUBCUTANEOUS at 05:38

## 2023-11-18 RX ADMIN — Medication 500 MILLIGRAM(S): at 18:46

## 2023-11-18 RX ADMIN — ESCITALOPRAM OXALATE 10 MILLIGRAM(S): 10 TABLET, FILM COATED ORAL at 12:34

## 2023-11-18 RX ADMIN — Medication 2: at 19:02

## 2023-11-18 RX ADMIN — Medication 0.5 MILLIGRAM(S): at 13:40

## 2023-11-18 RX ADMIN — ZINC OXIDE 1 APPLICATION(S): 200 OINTMENT TOPICAL at 12:36

## 2023-11-18 RX ADMIN — CHLORHEXIDINE GLUCONATE 15 MILLILITER(S): 213 SOLUTION TOPICAL at 18:50

## 2023-11-18 RX ADMIN — Medication 500 MILLIGRAM(S): at 12:40

## 2023-11-18 RX ADMIN — Medication 125 MICROGRAM(S): at 05:54

## 2023-11-18 RX ADMIN — Medication 1 APPLICATION(S): at 12:40

## 2023-11-18 RX ADMIN — Medication 2 DROP(S): at 12:37

## 2023-11-18 RX ADMIN — LIDOCAINE 1 PATCH: 4 CREAM TOPICAL at 12:35

## 2023-11-18 RX ADMIN — Medication 3 MILLIGRAM(S): at 21:04

## 2023-11-18 RX ADMIN — OXYCODONE HYDROCHLORIDE 2.5 MILLIGRAM(S): 5 TABLET ORAL at 14:17

## 2023-11-18 RX ADMIN — SENNA PLUS 10 MILLILITER(S): 8.6 TABLET ORAL at 21:04

## 2023-11-18 RX ADMIN — INSULIN GLARGINE 14 UNIT(S): 100 INJECTION, SOLUTION SUBCUTANEOUS at 05:56

## 2023-11-18 RX ADMIN — Medication 3 MILLILITER(S): at 17:34

## 2023-11-18 RX ADMIN — Medication 0: at 05:54

## 2023-11-18 RX ADMIN — Medication 1 TABLET(S): at 05:53

## 2023-11-18 RX ADMIN — QUETIAPINE FUMARATE 12.5 MILLIGRAM(S): 200 TABLET, FILM COATED ORAL at 21:04

## 2023-11-18 RX ADMIN — Medication 1 APPLICATION(S): at 18:47

## 2023-11-18 RX ADMIN — Medication 2 DROP(S): at 05:55

## 2023-11-18 RX ADMIN — Medication 5 MILLIGRAM(S): at 05:54

## 2023-11-18 RX ADMIN — LIDOCAINE 1 PATCH: 4 CREAM TOPICAL at 00:00

## 2023-11-18 RX ADMIN — Medication 1 APPLICATION(S): at 00:39

## 2023-11-18 RX ADMIN — Medication 500 MILLIGRAM(S): at 00:37

## 2023-11-18 NOTE — PROGRESS NOTE ADULT - SUBJECTIVE AND OBJECTIVE BOX
Patient is a 73y old  Female who presents with a chief complaint of peg tube (16 Nov 2023 06:51)      SUBJECTIVE / OVERNIGHT EVENTS: no new events    MEDICATIONS  (STANDING):  albuterol/ipratropium for Nebulization 3 milliLiter(s) Nebulizer every 6 hours  artificial  tears Solution 2 Drop(s) Both EYES four times a day  ascorbic acid 500 milliGRAM(s) Oral daily  bacitracin   Ointment 1 Application(s) Topical two times a day  bisacodyl Suppository 10 milliGRAM(s) Rectal once  calcium carbonate    500 mG (Tums) Chewable 1 Tablet(s) Chew every 12 hours  cephalexin 500 milliGRAM(s) Oral every 6 hours  chlorhexidine 0.12% Liquid 15 milliLiter(s) Oral Mucosa every 12 hours  chlorhexidine 4% Liquid 1 Application(s) Topical <User Schedule>  ciprofloxacin     Tablet 500 milliGRAM(s) Oral every 12 hours  dextrose 50% Injectable 50 milliLiter(s) IV Push once  enoxaparin Injectable 40 milliGRAM(s) SubCutaneous every 24 hours  escitalopram 10 milliGRAM(s) Oral daily  fentaNYL   Patch  25 MICROgram(s)/Hr 1 Patch Transdermal every 72 hours  gabapentin Solution 100 milliGRAM(s) Enteral Tube at bedtime  insulin glargine Injectable (LANTUS) 14 Unit(s) SubCutaneous every morning  insulin lispro (ADMELOG) corrective regimen sliding scale   SubCutaneous every 6 hours  levothyroxine 125 MICROGram(s) Oral <User Schedule>  lidocaine   4% Patch 1 Patch Transdermal daily  melatonin 3 milliGRAM(s) Oral at bedtime  multivitamin/minerals/iron Oral Solution (CENTRUM) 15 milliLiter(s) Oral daily  nystatin Powder 1 Application(s) Topical every 8 hours  oxybutynin 5 milliGRAM(s) Oral daily  pantoprazole   Suspension 40 milliGRAM(s) Enteral Tube daily  petrolatum Ophthalmic Ointment 1 Application(s) Both EYES four times a day  predniSONE   Tablet 5 milliGRAM(s) Oral daily  QUEtiapine 12.5 milliGRAM(s) Oral <User Schedule>  QUEtiapine 12.5 milliGRAM(s) Oral <User Schedule>  senna Syrup 10 milliLiter(s) Oral at bedtime  simethicone 80 milliGRAM(s) Chew four times a day  sodium chloride 3%  Inhalation 4 milliLiter(s) Inhalation every 12 hours  triamcinolone 0.1% Ointment 1 Application(s) Topical two times a day  zinc oxide 40% Paste 1 Application(s) Topical daily  zinc sulfate 220 milliGRAM(s) Oral daily    MEDICATIONS  (PRN):  acetaminophen   Oral Liquid .. 1000 milliGRAM(s) Enteral Tube every 6 hours PRN Temp greater or equal to 38C (100.4F), Mild Pain (1 - 3)  benzocaine 20% Spray 1 Spray(s) Topical four times a day PRN Cough  diphenhydrAMINE Elixir 25 milliGRAM(s) Oral every 6 hours PRN Rash and/or Itching  oxyCODONE    Solution 2.5 milliGRAM(s) Oral every 6 hours PRN Moderate Pain (4 - 6)  sodium chloride 0.65% Nasal 1 Spray(s) Both Nostrils every 6 hours PRN Nasal Congestion      Vital Signs Last 24 Hrs  T(F): 98 (11-18-23 @ 13:05), Max: 99.5 (11-17-23 @ 21:24)  HR: 97 (11-18-23 @ 15:05) (75 - 129)  BP: 120/72 (11-18-23 @ 13:30) (120/72 - 228/113)  RR: 12 (11-18-23 @ 03:15) (12 - 20)  SpO2: 98% (11-18-23 @ 15:05) (96% - 100%)  Telemetry:   CAPILLARY BLOOD GLUCOSE      POCT Blood Glucose.: 173 mg/dL (18 Nov 2023 17:25)  POCT Blood Glucose.: 214 mg/dL (18 Nov 2023 12:09)  POCT Blood Glucose.: 149 mg/dL (18 Nov 2023 05:52)  POCT Blood Glucose.: 223 mg/dL (18 Nov 2023 00:27)  POCT Blood Glucose.: 202 mg/dL (17 Nov 2023 18:04)    I&O's Summary    17 Nov 2023 07:01  -  18 Nov 2023 07:00  --------------------------------------------------------  IN: 1300 mL / OUT: 950 mL / NET: 350 mL    18 Nov 2023 07:01  -  18 Nov 2023 17:32  --------------------------------------------------------  IN: 0 mL / OUT: 400 mL / NET: -400 mL        PHYSICAL EXAM:  GENERAL: NAD, well-developed  HEAD:  Atraumatic, Normocephalic  EYES: EOMI, PERRLA, conjunctiva and sclera clear  NECK: Supple, No JVD  CHEST/LUNG: Clear to auscultation bilaterally; No wheeze  HEART: Regular rate and rhythm; No murmurs, rubs, or gallops  ABDOMEN: Soft, Nontender, Nondistended; Bowel sounds present  EXTREMITIES:  2+ Peripheral Pulses, No clubbing, cyanosis, or edema  PSYCH: AAOx3  NEUROLOGY: non-focal  SKIN: No rashes or lesions    LABS:                        8.9    9.19  )-----------( 124      ( 18 Nov 2023 09:15 )             30.4     11-18    137  |  99  |  29<H>  ----------------------------<  224<H>  4.1   |  28  |  <0.30<L>    Ca    10.0      18 Nov 2023 09:15  Mg     1.8     11-18    TPro  7.4  /  Alb  3.3  /  TBili  0.3  /  DBili  x   /  AST  19  /  ALT  18  /  AlkPhos  48  11-18          Urinalysis Basic - ( 18 Nov 2023 09:15 )    Color: x / Appearance: x / SG: x / pH: x  Gluc: 224 mg/dL / Ketone: x  / Bili: x / Urobili: x   Blood: x / Protein: x / Nitrite: x   Leuk Esterase: x / RBC: x / WBC x   Sq Epi: x / Non Sq Epi: x / Bacteria: x        RADIOLOGY & ADDITIONAL TESTS:    Imaging Personally Reviewed:    Consultant(s) Notes Reviewed:      Care Discussed with Consultants/Other Providers:

## 2023-11-18 NOTE — PROGRESS NOTE ADULT - SUBJECTIVE AND OBJECTIVE BOX
Patient is a 73y old  Female who presents with a chief complaint of peg tube (16 Nov 2023 06:51)      Interval Events:    REVIEW OF SYSTEMS:  [ ] Positive  [ ] All other systems negative  [ ] Unable to assess ROS because ________    Vital Signs Last 24 Hrs  T(C): 36.7 (11-18-23 @ 03:15), Max: 37.5 (11-17-23 @ 21:24)  T(F): 98.1 (11-18-23 @ 03:15), Max: 99.5 (11-17-23 @ 21:24)  HR: 75 (11-18-23 @ 06:06) (75 - 99)  BP: 123/61 (11-18-23 @ 03:15) (123/61 - 153/80)  RR: 12 (11-18-23 @ 03:15) (12 - 20)  SpO2: 99% (11-18-23 @ 06:06) (98% - 100%)    PHYSICAL EXAM:  HEENT:   [ ]Tracheostomy:  [ ]Pupils equal  [ ]No oral lesions  [ ]Abnormal    SKIN  [ ]No Rash  [ ] Abnormal  [ ] pressure    CARDIAC  [ ]Regular  [ ]Abnormal    PULMONARY  [ ]Bilateral Clear Breath Sounds  [ ]Normal Excursion  [ ]Abnormal    GI  [ ]PEG      [ ] +BS		              [ ]Soft, nondistended, nontender	  [ ]Abnormal    MUSCULOSKELETAL                                   [ ]Bedbound                 [ ]Abnormal    [ ]Ambulatory/OOB to chair                           EXTREMITIES                                         [ ]Normal  [ ]Edema                           NEUROLOGIC  [ ] Normal, non focal  [ ] Focal findings:    PSYCHIATRIC  [ ]Alert and appropriate  [ ] Sedated	 [ ]Agitated    :  Mcbride: [ ] Yes, if yes: Date of Placement:                   [  ] No    LINES: Central Lines [ ] Yes, if yes: Date of Placement                                     [  ] No    HOSPITAL MEDICATIONS:  MEDICATIONS  (STANDING):  albuterol/ipratropium for Nebulization 3 milliLiter(s) Nebulizer every 6 hours  artificial  tears Solution 2 Drop(s) Both EYES four times a day  ascorbic acid 500 milliGRAM(s) Oral daily  bacitracin   Ointment 1 Application(s) Topical two times a day  bisacodyl Suppository 10 milliGRAM(s) Rectal once  calcium carbonate    500 mG (Tums) Chewable 1 Tablet(s) Chew every 12 hours  cephalexin 500 milliGRAM(s) Oral every 6 hours  chlorhexidine 0.12% Liquid 15 milliLiter(s) Oral Mucosa every 12 hours  chlorhexidine 4% Liquid 1 Application(s) Topical <User Schedule>  ciprofloxacin     Tablet 500 milliGRAM(s) Oral every 12 hours  dextrose 50% Injectable 50 milliLiter(s) IV Push once  enoxaparin Injectable 40 milliGRAM(s) SubCutaneous every 24 hours  escitalopram 10 milliGRAM(s) Oral daily  fentaNYL   Patch  25 MICROgram(s)/Hr 1 Patch Transdermal every 72 hours  gabapentin Solution 100 milliGRAM(s) Enteral Tube at bedtime  insulin glargine Injectable (LANTUS) 14 Unit(s) SubCutaneous every morning  insulin lispro (ADMELOG) corrective regimen sliding scale   SubCutaneous every 6 hours  levothyroxine 125 MICROGram(s) Oral <User Schedule>  lidocaine   4% Patch 1 Patch Transdermal daily  melatonin 3 milliGRAM(s) Oral at bedtime  multivitamin/minerals/iron Oral Solution (CENTRUM) 15 milliLiter(s) Oral daily  nystatin Powder 1 Application(s) Topical every 8 hours  oxybutynin 5 milliGRAM(s) Oral daily  pantoprazole   Suspension 40 milliGRAM(s) Enteral Tube daily  petrolatum Ophthalmic Ointment 1 Application(s) Both EYES four times a day  predniSONE   Tablet 5 milliGRAM(s) Oral daily  QUEtiapine 12.5 milliGRAM(s) Oral <User Schedule>  QUEtiapine 12.5 milliGRAM(s) Oral <User Schedule>  senna Syrup 10 milliLiter(s) Oral at bedtime  simethicone 80 milliGRAM(s) Chew four times a day  sodium chloride 3%  Inhalation 4 milliLiter(s) Inhalation every 12 hours  triamcinolone 0.1% Ointment 1 Application(s) Topical two times a day  zinc oxide 40% Paste 1 Application(s) Topical daily  zinc sulfate 220 milliGRAM(s) Oral daily    MEDICATIONS  (PRN):  acetaminophen   Oral Liquid .. 1000 milliGRAM(s) Enteral Tube every 6 hours PRN Temp greater or equal to 38C (100.4F), Mild Pain (1 - 3)  benzocaine 20% Spray 1 Spray(s) Topical four times a day PRN Cough  diphenhydrAMINE Elixir 25 milliGRAM(s) Oral every 6 hours PRN Rash and/or Itching  oxyCODONE    Solution 2.5 milliGRAM(s) Oral every 6 hours PRN Moderate Pain (4 - 6)  sodium chloride 0.65% Nasal 1 Spray(s) Both Nostrils every 6 hours PRN Nasal Congestion      LABS:                  CAPILLARY BLOOD GLUCOSE    MICROBIOLOGY:     RADIOLOGY:  [ ] Reviewed and interpreted by me    Mode: AC/ CMV (Assist Control/ Continuous Mandatory Ventilation)  RR (machine): 12  TV (machine): 300  FiO2: 30  PEEP: 5  ITime: 1  MAP: 8  PIP: 24   Patient is a 73y old  Female who presents with a chief complaint of peg tube (16 Nov 2023 06:51)      Interval Events: No events reported over night.     REVIEW OF SYSTEMS:  [ ] Positive  [X] All other systems negative  [ ] Unable to assess ROS because ________    Vital Signs Last 24 Hrs  T(C): 36.7 (11-18-23 @ 03:15), Max: 37.5 (11-17-23 @ 21:24)  T(F): 98.1 (11-18-23 @ 03:15), Max: 99.5 (11-17-23 @ 21:24)  HR: 75 (11-18-23 @ 06:06) (75 - 99)  BP: 123/61 (11-18-23 @ 03:15) (123/61 - 153/80)  RR: 12 (11-18-23 @ 03:15) (12 - 20)  SpO2: 99% (11-18-23 @ 06:06) (98% - 100%)      PHYSICAL EXAM:  HEENT:   [X] Tracheostomy:  #8 distal XLT Shiley  [X] 4mm PERRL B/L  [X] No oral lesions  [ ] Abnormal    SKIN  [ ] No Rash  [ ]  Abnormal  [X] pressure: Sacral wound    CARDIAC  [X] Regular  [ ] Abnormal    PULMONARY  [X] Bilateral Clear Breath Sounds  [ ] Normal Excursion  [ ] Abnormal    GI  [X] PEG      [X] +BS		              [X] Soft, nondistended, nontender	  [ ] Abnormal    MUSCULOSKELETAL                                   [  ] Bedbound                 [X] Abnormal:  Deconditioned but can partially move all limbs   [ ] Ambulatory/OOB to chair                           EXTREMITIES                                         [X] Normal  [ ]Edema                           NEUROLOGIC  [ ] Normal, non focal  [X] Focal findings:  Alert and Oriented x2; responds appropriately top questions by mouthing and able to phonate while on vent; B/L foot drop with partial ability to dorsiflex; moves both arms with minimal hand dexterity B/L    PSYCHIATRIC  [X] Alert; somewhat anxious at times but mostly appropriate  [ ] Sedated	 [ ]Agitated    :  Mcbride: [ ] Yes, if yes: Date of Placement:                   [X] No    LINES: Central Lines [ ] Yes, if yes: Date of Placement                                     [X] No      HOSPITAL MEDICATIONS:  MEDICATIONS  (STANDING):  albuterol/ipratropium for Nebulization 3 milliLiter(s) Nebulizer every 6 hours  artificial  tears Solution 2 Drop(s) Both EYES four times a day  ascorbic acid 500 milliGRAM(s) Oral daily  bacitracin   Ointment 1 Application(s) Topical two times a day  bisacodyl Suppository 10 milliGRAM(s) Rectal once  calcium carbonate    500 mG (Tums) Chewable 1 Tablet(s) Chew every 12 hours  cephalexin 500 milliGRAM(s) Oral every 6 hours  chlorhexidine 0.12% Liquid 15 milliLiter(s) Oral Mucosa every 12 hours  chlorhexidine 4% Liquid 1 Application(s) Topical <User Schedule>  ciprofloxacin     Tablet 500 milliGRAM(s) Oral every 12 hours  dextrose 50% Injectable 50 milliLiter(s) IV Push once  enoxaparin Injectable 40 milliGRAM(s) SubCutaneous every 24 hours  escitalopram 10 milliGRAM(s) Oral daily  fentaNYL   Patch  25 MICROgram(s)/Hr 1 Patch Transdermal every 72 hours  gabapentin Solution 100 milliGRAM(s) Enteral Tube at bedtime  insulin glargine Injectable (LANTUS) 14 Unit(s) SubCutaneous every morning  insulin lispro (ADMELOG) corrective regimen sliding scale   SubCutaneous every 6 hours  levothyroxine 125 MICROGram(s) Oral <User Schedule>  lidocaine   4% Patch 1 Patch Transdermal daily  melatonin 3 milliGRAM(s) Oral at bedtime  multivitamin/minerals/iron Oral Solution (CENTRUM) 15 milliLiter(s) Oral daily  nystatin Powder 1 Application(s) Topical every 8 hours  oxybutynin 5 milliGRAM(s) Oral daily  pantoprazole   Suspension 40 milliGRAM(s) Enteral Tube daily  petrolatum Ophthalmic Ointment 1 Application(s) Both EYES four times a day  predniSONE   Tablet 5 milliGRAM(s) Oral daily  QUEtiapine 12.5 milliGRAM(s) Oral <User Schedule>  QUEtiapine 12.5 milliGRAM(s) Oral <User Schedule>  senna Syrup 10 milliLiter(s) Oral at bedtime  simethicone 80 milliGRAM(s) Chew four times a day  sodium chloride 3%  Inhalation 4 milliLiter(s) Inhalation every 12 hours  triamcinolone 0.1% Ointment 1 Application(s) Topical two times a day  zinc oxide 40% Paste 1 Application(s) Topical daily  zinc sulfate 220 milliGRAM(s) Oral daily    MEDICATIONS  (PRN):  acetaminophen   Oral Liquid .. 1000 milliGRAM(s) Enteral Tube every 6 hours PRN Temp greater or equal to 38C (100.4F), Mild Pain (1 - 3)  benzocaine 20% Spray 1 Spray(s) Topical four times a day PRN Cough  diphenhydrAMINE Elixir 25 milliGRAM(s) Oral every 6 hours PRN Rash and/or Itching  oxyCODONE    Solution 2.5 milliGRAM(s) Oral every 6 hours PRN Moderate Pain (4 - 6)  sodium chloride 0.65% Nasal 1 Spray(s) Both Nostrils every 6 hours PRN Nasal Congestion      LABS:                          8.9    9.19  )-----------( 124      ( 18 Nov 2023 09:15 )             30.4     11-18    137  |  99  |  29<H>  ----------------------------<  224<H>  4.1   |  28  |  <0.30<L>    Ca    10.0      18 Nov 2023 09:15  Mg     1.8     11-18    TPro  7.4  /  Alb  3.3  /  TBili  0.3  /  DBili  x   /  AST  19  /  ALT  18  /  AlkPhos  48  11-18                CAPILLARY BLOOD GLUCOSE    MICROBIOLOGY:     RADIOLOGY:  [ ] Reviewed and interpreted by me    Mode: AC/ CMV (Assist Control/ Continuous Mandatory Ventilation)  RR (machine): 12  TV (machine): 300  FiO2: 30  PEEP: 5  ITime: 1  MAP: 8  PIP: 24

## 2023-11-18 NOTE — PROGRESS NOTE ADULT - NS ATTEND AMEND GEN_ALL_CORE FT
73F with a h/o DM, HTN, HLD, anemia, hypothyroidism, RA, fibromyalgia, remote cerebral aneurysm with repair, hypercapnic respiratory failure s/p tracheostomy/PEG, bedbound, sacral pressure ulcer. Admitted w/ bleeding from tracheostomy and PEG w/ associated abdominal pain, recent hx of auditory/visual hallucinations. Since admission, no further bleeding noted from either trach or PEG. Imaging showed mild thickening along PEG tube tract and sacral ulcer extending to coccyx suggestive of OM.     MRI with osteomyelitis  Repeat sputum now with serratia, increased secretions back on abx.  Improving delirium, patient awake and appropriately interacting this morning.     # Osteomyelitis  # Chronic hypoxemic RF  # oropharyngeal dysphagia  # acute on chronic pain  # Trach/Peg bleeding  # Tracheitis with Pseudomonas and serratia  # Hx of hallucination, anxiety    - MRI showing sacral Osteo, on abx per ID, c/w wound care, no plans for surgery. Will need prolonged course of abx. Afebrile, cultures from 11/12 NGTD. Completed course of Cefepime on 11/15 for sputum culture with PA and serratia, now on Cipro and Keflex.  Will f/u with ID.   - c/w Airway clearance -  Duonebs and 3% Hypersal, chest PT.   - PS trials during the day, vent at night, and will need vent on discharge. Today she was placed on trach collar - obtain ABG ASAP - if hypercapnic or any signs of distress will resume vent  - family requested S/S eval but unable to given dependence on vent  - C/W pain control, multifactorial 2/2 to fibromyalgia as well as now osteo with pressure ulcer.    - Peg irritation seen by GI, no recs for further interventions. H/H stable  - Thrombocytopenia - improved   - Appreciate  follow up for delirium and hallucinations, patient calm and appropriate today - c/w Seroquel 12.5 mg at 3 pm and another 12.5 mg at 8 pm. Melatonin QHS for sleep. c/w Lexapro at current dose.  - Corneal erythema improved, no evidence of discharge, completed a course of Cipro eye drops, c/w artificial tears  - Appreciate derm consult for right back skin lesion- dermatofibroma, benign lesion. Mid back with irritated seborrheic keratosis, topical triamcinolone 0.1% bid   - monitor for signs of trach site bleeding, contact ENT if severe bleeding noted   - DVT ppx- lovenox  - Dispo- full code.

## 2023-11-18 NOTE — PROGRESS NOTE ADULT - PROBLEM SELECTOR PLAN 3
Chronic Trach/ Vent dependant 2/2 Hypercapnic Resp Failure since 10/2022   -S/p Trach # 8 Cuffed Flex Shiley; trach change 8/18, PEG change 8/14 per records   - Continue Home vent settings: (300 TV/ RR 12/5 Peep/ Fio2 30%)  - Tolerates intermittent PSV 15/5 during day/ Vent HS  - Airway Clearance: Duoneb, Hypersal, Chest PT, PRN suctioning   - Maintain 02 sat > 92%  Tracheal Bleeding (Intermittent)-->resolved since admission   -S/p CT Angio neck: No evidence of active bleeding around tracheostomy site. No hematoma.  No collection   - Seen by ENT; Kath and b/l bronchi visualized without any trauma. small amount of blood tinged secretions resting at beginning of right bronchus not actively bleeding.  - 11/3 trach changed to #9 Portex by ENT Chronic Trach/ Vent dependant 2/2 Hypercapnic Resp Failure since 10/2022   -S/p Trach # 8 Cuffed Flex Shiley; trach change 8/18, PEG change 8/14 per records   - Continue Home vent settings: (300 TV/ RR 12/5 Peep/ Fio2 30%).  11/18 RR increased to 14 due to hypercarbia from trach collar trial  - Tolerates intermittent PSV 15/5 during day/ Vent HS  - Airway Clearance: Duoneb, Hypersal, Chest PT, PRN suctioning   - Maintain 02 sat > 92%  Tracheal Bleeding (Intermittent)-->resolved since admission   -S/p CT Angio neck: No evidence of active bleeding around tracheostomy site. No hematoma.  No collection   - Seen by ENT; Kath and b/l bronchi visualized without any trauma. small amount of blood tinged secretions resting at beginning of right bronchus not actively bleeding.  - 11/3 trach changed to #9 Portex by ENT  - 11/17:  Due to persistent air leak and volume loss on vent, trach again changed by ENT to #8 distal cuffed shiley.  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

## 2023-11-18 NOTE — PROGRESS NOTE ADULT - PROBLEM SELECTOR PLAN 6
-Continue home Prednisone regimen  *fibromyalgia  -continue home Gabapentin 100mg daily  -continue home Fentanyl 25mcg Q72H patches  - added Lidocaine patch   - Oxycodone 2.5mg q6 hrs PRN (in addition to Tylenol PRN)

## 2023-11-18 NOTE — PROGRESS NOTE ADULT - SUBJECTIVE AND OBJECTIVE BOX
INTERVAL HPI/OVERNIGHT EVENTS:      no events overnight           acetaminophen   Oral Liquid .. 1000 milliGRAM(s) Enteral Tube every 6 hours PRN  albuterol/ipratropium for Nebulization 3 milliLiter(s) Nebulizer every 6 hours  artificial  tears Solution 2 Drop(s) Both EYES four times a day  ascorbic acid 500 milliGRAM(s) Oral daily  bacitracin   Ointment 1 Application(s) Topical two times a day  benzocaine 20% Spray 1 Spray(s) Topical four times a day PRN  bisacodyl Suppository 10 milliGRAM(s) Rectal once  calcium carbonate    500 mG (Tums) Chewable 1 Tablet(s) Chew every 12 hours  cephalexin 500 milliGRAM(s) Oral every 6 hours  chlorhexidine 0.12% Liquid 15 milliLiter(s) Oral Mucosa every 12 hours  chlorhexidine 4% Liquid 1 Application(s) Topical <User Schedule>  ciprofloxacin     Tablet 500 milliGRAM(s) Oral every 12 hours  dextrose 50% Injectable 50 milliLiter(s) IV Push once  diphenhydrAMINE Elixir 25 milliGRAM(s) Oral every 6 hours PRN  enoxaparin Injectable 40 milliGRAM(s) SubCutaneous every 24 hours  escitalopram 10 milliGRAM(s) Oral daily  fentaNYL   Patch  25 MICROgram(s)/Hr 1 Patch Transdermal every 72 hours  gabapentin Solution 100 milliGRAM(s) Enteral Tube at bedtime  insulin glargine Injectable (LANTUS) 14 Unit(s) SubCutaneous every morning  insulin lispro (ADMELOG) corrective regimen sliding scale   SubCutaneous every 6 hours  levothyroxine 125 MICROGram(s) Oral <User Schedule>  lidocaine   4% Patch 1 Patch Transdermal daily  LORazepam   Injectable 0.5 milliGRAM(s) IV Push once PRN  melatonin 3 milliGRAM(s) Oral at bedtime  multivitamin/minerals/iron Oral Solution (CENTRUM) 15 milliLiter(s) Oral daily  nystatin Powder 1 Application(s) Topical every 8 hours  oxybutynin 5 milliGRAM(s) Oral daily  oxyCODONE    Solution 2.5 milliGRAM(s) Oral every 6 hours PRN  pantoprazole   Suspension 40 milliGRAM(s) Enteral Tube daily  petrolatum Ophthalmic Ointment 1 Application(s) Both EYES four times a day  predniSONE   Tablet 5 milliGRAM(s) Oral daily  QUEtiapine 12.5 milliGRAM(s) Oral <User Schedule>  QUEtiapine 12.5 milliGRAM(s) Oral <User Schedule>  senna Syrup 10 milliLiter(s) Oral at bedtime  simethicone 80 milliGRAM(s) Chew four times a day  sodium chloride 0.65% Nasal 1 Spray(s) Both Nostrils every 6 hours PRN  sodium chloride 3%  Inhalation 4 milliLiter(s) Inhalation every 12 hours  triamcinolone 0.1% Ointment 1 Application(s) Topical two times a day  zinc oxide 40% Paste 1 Application(s) Topical daily  zinc sulfate 220 milliGRAM(s) Oral daily                            8.9    9.19  )-----------( 124      ( 18 Nov 2023 09:15 )             30.4       Hemoglobin: 8.9 g/dL (11-18 @ 09:15)  Hemoglobin: 8.3 g/dL (11-16 @ 06:39)  Hemoglobin: 8.4 g/dL (11-15 @ 06:40)  Hemoglobin: 9.4 g/dL (11-14 @ 12:14)      11-18    137  |  99  |  29<H>  ----------------------------<  224<H>  4.1   |  28  |  <0.30<L>    Ca    10.0      18 Nov 2023 09:15  Mg     1.8     11-18    TPro  7.4  /  Alb  3.3  /  TBili  0.3  /  DBili  x   /  AST  19  /  ALT  18  /  AlkPhos  48  11-18    Creatinine Trend: <0.30<--, <0.30<--, <0.30<--, <0.30<--, <0.30<--, <0.30<--    COAGS:           T(C): 36.7 (11-18-23 @ 03:15), Max: 37.5 (11-17-23 @ 21:24)  HR: 83 (11-18-23 @ 08:45) (75 - 99)  BP: 123/61 (11-18-23 @ 03:15) (123/61 - 153/80)  RR: 12 (11-18-23 @ 03:15) (12 - 20)  SpO2: 98% (11-18-23 @ 08:45) (98% - 100%)  Wt(kg): --    I&O's Summary    17 Nov 2023 07:01  -  18 Nov 2023 07:00  --------------------------------------------------------  IN: 1300 mL / OUT: 950 mL / NET: 350 mL      Review of Systems:  unable to obtain     PHYSICAL EXAM:    Constitutional: NAD  HEENT: EOMI, throat clear  Neck: No LAD, supple  Respiratory: CTA and P  Cardiovascular: S1 and S2, RRR, no M  Gastrointestinal: BS+, soft, NT/ND, neg HSM, +peg c/d/i  Extremities: No peripheral edema, neg clubbing, cyanosis  Vascular: 2+ peripheral pulses  Neurological: A/O   Psychiatric: Normal mood, normal affect  Skin: No rashes

## 2023-11-18 NOTE — PROVIDER CONTACT NOTE (CRITICAL VALUE NOTIFICATION) - ACTION/TREATMENT ORDERED:
BERNA Bergeron informed of all results PCO2 70, PH 7.25 O2 Sat 99.6, Bicarb level 31, Po2 121. Full ventilator support ordered and initiated.

## 2023-11-19 LAB
BASE EXCESS BLDA CALC-SCNC: 3.8 MMOL/L — HIGH (ref -2–3)
BASE EXCESS BLDA CALC-SCNC: 3.8 MMOL/L — HIGH (ref -2–3)
CO2 BLDA-SCNC: 31 MMOL/L — HIGH (ref 19–24)
CO2 BLDA-SCNC: 31 MMOL/L — HIGH (ref 19–24)
GLUCOSE BLDC GLUCOMTR-MCNC: 157 MG/DL — HIGH (ref 70–99)
GLUCOSE BLDC GLUCOMTR-MCNC: 157 MG/DL — HIGH (ref 70–99)
GLUCOSE BLDC GLUCOMTR-MCNC: 223 MG/DL — HIGH (ref 70–99)
GLUCOSE BLDC GLUCOMTR-MCNC: 223 MG/DL — HIGH (ref 70–99)
GLUCOSE BLDC GLUCOMTR-MCNC: 224 MG/DL — HIGH (ref 70–99)
GLUCOSE BLDC GLUCOMTR-MCNC: 224 MG/DL — HIGH (ref 70–99)
GLUCOSE BLDC GLUCOMTR-MCNC: 303 MG/DL — HIGH (ref 70–99)
GLUCOSE BLDC GLUCOMTR-MCNC: 303 MG/DL — HIGH (ref 70–99)
HCO3 BLDA-SCNC: 29 MMOL/L — HIGH (ref 21–28)
HCO3 BLDA-SCNC: 29 MMOL/L — HIGH (ref 21–28)
HOROWITZ INDEX BLDA+IHG-RTO: 30 — SIGNIFICANT CHANGE UP
HOROWITZ INDEX BLDA+IHG-RTO: 30 — SIGNIFICANT CHANGE UP
PCO2 BLDA: 46 MMHG — HIGH (ref 32–45)
PCO2 BLDA: 46 MMHG — HIGH (ref 32–45)
PH BLDA: 7.41 — SIGNIFICANT CHANGE UP (ref 7.35–7.45)
PH BLDA: 7.41 — SIGNIFICANT CHANGE UP (ref 7.35–7.45)
PO2 BLDA: 121 MMHG — HIGH (ref 83–108)
PO2 BLDA: 121 MMHG — HIGH (ref 83–108)
SAO2 % BLDA: 98.8 % — HIGH (ref 94–98)
SAO2 % BLDA: 98.8 % — HIGH (ref 94–98)

## 2023-11-19 PROCEDURE — 99233 SBSQ HOSP IP/OBS HIGH 50: CPT | Mod: FS

## 2023-11-19 RX ADMIN — Medication 4: at 18:06

## 2023-11-19 RX ADMIN — SODIUM CHLORIDE 4 MILLILITER(S): 9 INJECTION INTRAMUSCULAR; INTRAVENOUS; SUBCUTANEOUS at 17:36

## 2023-11-19 RX ADMIN — Medication 1 APPLICATION(S): at 05:15

## 2023-11-19 RX ADMIN — SIMETHICONE 80 MILLIGRAM(S): 80 TABLET, CHEWABLE ORAL at 05:13

## 2023-11-19 RX ADMIN — Medication 1 APPLICATION(S): at 22:11

## 2023-11-19 RX ADMIN — Medication 1 APPLICATION(S): at 05:12

## 2023-11-19 RX ADMIN — Medication 2 DROP(S): at 17:38

## 2023-11-19 RX ADMIN — Medication 1 TABLET(S): at 05:12

## 2023-11-19 RX ADMIN — QUETIAPINE FUMARATE 12.5 MILLIGRAM(S): 200 TABLET, FILM COATED ORAL at 22:09

## 2023-11-19 RX ADMIN — GABAPENTIN 100 MILLIGRAM(S): 400 CAPSULE ORAL at 22:09

## 2023-11-19 RX ADMIN — SIMETHICONE 80 MILLIGRAM(S): 80 TABLET, CHEWABLE ORAL at 00:43

## 2023-11-19 RX ADMIN — Medication 3 MILLILITER(S): at 05:53

## 2023-11-19 RX ADMIN — NYSTATIN CREAM 1 APPLICATION(S): 100000 CREAM TOPICAL at 13:01

## 2023-11-19 RX ADMIN — SIMETHICONE 80 MILLIGRAM(S): 80 TABLET, CHEWABLE ORAL at 12:28

## 2023-11-19 RX ADMIN — Medication 1 TABLET(S): at 17:38

## 2023-11-19 RX ADMIN — SIMETHICONE 80 MILLIGRAM(S): 80 TABLET, CHEWABLE ORAL at 22:09

## 2023-11-19 RX ADMIN — Medication 2 DROP(S): at 12:29

## 2023-11-19 RX ADMIN — Medication 2 DROP(S): at 22:11

## 2023-11-19 RX ADMIN — CHLORHEXIDINE GLUCONATE 1 APPLICATION(S): 213 SOLUTION TOPICAL at 05:13

## 2023-11-19 RX ADMIN — Medication 1 APPLICATION(S): at 12:28

## 2023-11-19 RX ADMIN — Medication 3 MILLIGRAM(S): at 22:09

## 2023-11-19 RX ADMIN — Medication 1 APPLICATION(S): at 17:38

## 2023-11-19 RX ADMIN — Medication 500 MILLIGRAM(S): at 12:27

## 2023-11-19 RX ADMIN — Medication 500 MILLIGRAM(S): at 22:09

## 2023-11-19 RX ADMIN — CHLORHEXIDINE GLUCONATE 15 MILLILITER(S): 213 SOLUTION TOPICAL at 05:12

## 2023-11-19 RX ADMIN — SIMETHICONE 80 MILLIGRAM(S): 80 TABLET, CHEWABLE ORAL at 17:41

## 2023-11-19 RX ADMIN — Medication 500 MILLIGRAM(S): at 05:13

## 2023-11-19 RX ADMIN — Medication 4: at 06:48

## 2023-11-19 RX ADMIN — Medication 125 MICROGRAM(S): at 05:18

## 2023-11-19 RX ADMIN — NYSTATIN CREAM 1 APPLICATION(S): 100000 CREAM TOPICAL at 22:10

## 2023-11-19 RX ADMIN — Medication 8: at 12:29

## 2023-11-19 RX ADMIN — ENOXAPARIN SODIUM 40 MILLIGRAM(S): 100 INJECTION SUBCUTANEOUS at 05:12

## 2023-11-19 RX ADMIN — Medication 3 MILLILITER(S): at 11:16

## 2023-11-19 RX ADMIN — Medication 5 MILLIGRAM(S): at 12:27

## 2023-11-19 RX ADMIN — NYSTATIN CREAM 1 APPLICATION(S): 100000 CREAM TOPICAL at 05:13

## 2023-11-19 RX ADMIN — ESCITALOPRAM OXALATE 10 MILLIGRAM(S): 10 TABLET, FILM COATED ORAL at 12:28

## 2023-11-19 RX ADMIN — ZINC SULFATE TAB 220 MG (50 MG ZINC EQUIVALENT) 220 MILLIGRAM(S): 220 (50 ZN) TAB at 12:28

## 2023-11-19 RX ADMIN — PANTOPRAZOLE SODIUM 40 MILLIGRAM(S): 20 TABLET, DELAYED RELEASE ORAL at 12:27

## 2023-11-19 RX ADMIN — Medication 2: at 00:44

## 2023-11-19 RX ADMIN — Medication 1 APPLICATION(S): at 17:39

## 2023-11-19 RX ADMIN — Medication 2 DROP(S): at 00:43

## 2023-11-19 RX ADMIN — Medication 500 MILLIGRAM(S): at 12:28

## 2023-11-19 RX ADMIN — INSULIN GLARGINE 14 UNIT(S): 100 INJECTION, SOLUTION SUBCUTANEOUS at 06:48

## 2023-11-19 RX ADMIN — LIDOCAINE 1 PATCH: 4 CREAM TOPICAL at 19:00

## 2023-11-19 RX ADMIN — Medication 500 MILLIGRAM(S): at 17:38

## 2023-11-19 RX ADMIN — Medication 500 MILLIGRAM(S): at 17:41

## 2023-11-19 RX ADMIN — LIDOCAINE 1 PATCH: 4 CREAM TOPICAL at 12:26

## 2023-11-19 RX ADMIN — SODIUM CHLORIDE 4 MILLILITER(S): 9 INJECTION INTRAMUSCULAR; INTRAVENOUS; SUBCUTANEOUS at 05:54

## 2023-11-19 RX ADMIN — CHLORHEXIDINE GLUCONATE 15 MILLILITER(S): 213 SOLUTION TOPICAL at 17:38

## 2023-11-19 RX ADMIN — QUETIAPINE FUMARATE 12.5 MILLIGRAM(S): 200 TABLET, FILM COATED ORAL at 16:45

## 2023-11-19 RX ADMIN — Medication 1 APPLICATION(S): at 00:44

## 2023-11-19 RX ADMIN — Medication 1 APPLICATION(S): at 17:41

## 2023-11-19 RX ADMIN — Medication 3 MILLILITER(S): at 17:35

## 2023-11-19 RX ADMIN — Medication 5 MILLIGRAM(S): at 05:13

## 2023-11-19 RX ADMIN — Medication 1 APPLICATION(S): at 05:14

## 2023-11-19 RX ADMIN — ZINC OXIDE 1 APPLICATION(S): 200 OINTMENT TOPICAL at 12:29

## 2023-11-19 RX ADMIN — Medication 15 MILLILITER(S): at 12:27

## 2023-11-19 RX ADMIN — Medication 500 MILLIGRAM(S): at 00:43

## 2023-11-19 RX ADMIN — Medication 2 DROP(S): at 05:14

## 2023-11-19 NOTE — PROGRESS NOTE ADULT - ASSESSMENT
72 y/o F with PMHx of T2DM, HTN, HLD, anemia, hypothyroidism, RA, fibromyalgia, remote cerebral aneurysm repair, acute hypercapnic respiratory failure now with tracheostomy, PEG tube, and bedbound (trach change 8/18, PEG change 8/14), sacral pressure ulcer, BIBEMS from home for 6 day hx of bleeding from the tracheostomy and PEG tube with associated abdominal pain, recent history of auditory and visual hallucinations, and with chronic sacral decubitus ulcer. Exam notable for mild abdominal distention with LLQ and RLQ tenderness, tracheostomy in place with no obvious bleeding, PEG tube with scant amount of dried blood, at baseline neurologic status. Imaging showing no active bleeding around tracheostomy site, however was notable for mild thickening along PEG tube tract, sacral ulcer extending to the coccyx suggestive of possible osteomyelitis, and bibasilar patchy lung opacities with volume loss. Patient admitted for respiratory support, wound care of tracheostomy and PEG, and management of sacral osteomyelitis. No further Trach and peg site  bleeding noted since admission.  Pelvic MRI imaging also suggestive of Acute OM. Patient placed on long-term antibiotics with Infectious disease guidance.  Additionally treated with a 7d course of Cefepime due to PSA and Serratia tracheitis on 11/8-->11/15 and then resumed cipro/keflex.  Hospital course c/b Delirium for which Seroquel was added and home Lexapro continued. Tracheostomy changed to #9 Cuffed Portex by ENT 11/3.  Despite trach change patient remained with persistent air leak.  On 11/19, the trach was again changed due to leak and loss of volumes on vent.  Trach was changed to #8 distal cuffed Shiley.  Patient seen by Dermatology for back lesions.  One diagnosed as a seborrheic keratitis and the other a dermatofibroma. 1    11/18:  Attempted trach collar which was tolerated for over 2.5 hours  however ABG revealed acute respiratory acidosis.  Thus patient placed back on vent.  Subsequent ABG with mild improvement thus RR increased from 12 to 16.

## 2023-11-19 NOTE — PROGRESS NOTE ADULT - NS ATTEND AMEND GEN_ALL_CORE FT
73F with a h/o DM, HTN, HLD, anemia, hypothyroidism, RA, fibromyalgia, remote cerebral aneurysm with repair, hypercapnic respiratory failure s/p tracheostomy/PEG, bedbound, sacral pressure ulcer. Admitted w/ bleeding from tracheostomy and PEG w/ associated abdominal pain, recent hx of auditory/visual hallucinations. Since admission, no further bleeding noted from either trach or PEG. Imaging showed mild thickening along PEG tube tract and sacral ulcer extending to coccyx suggestive of OM.     MRI with osteomyelitis  Repeat sputum now with serratia, increased secretions back on abx.  Improving delirium, patient awake and appropriately interacting this morning.     # Osteomyelitis  # Chronic hypoxemic RF  # oropharyngeal dysphagia  # acute on chronic pain  # Trach/Peg bleeding  # Tracheitis with Pseudomonas and serratia  # Hx of hallucination, anxiety    - MRI showing sacral Osteo, on abx per ID, c/w wound care, no plans for surgery. Will need prolonged course of abx. Afebrile, cultures from 11/12 NGTD. Completed course of Cefepime on 11/15 for sputum culture with PA and serratia, now on Cipro and Keflex.  Will f/u with ID.   - c/w Airway clearance -  Duonebs and 3% Hypersal, chest PT.   - PS trials during the day, vent at night, and will need vent on discharge.    - family requested S/S eval but unable to given dependence on vent  - C/W pain control, multifactorial 2/2 to fibromyalgia as well as now osteo with pressure ulcer.    - Peg irritation seen by GI, no recs for further interventions. H/H stable  - Thrombocytopenia - improved   - Appreciate  follow up for delirium and hallucinations, patient calm and appropriate today - c/w Seroquel 12.5 mg at 3 pm and another 12.5 mg at 8 pm. Melatonin QHS for sleep. c/w Lexapro at current dose.  - Corneal erythema improved, no evidence of discharge, completed a course of Cipro eye drops, c/w artificial tears  - Appreciate derm consult for right back skin lesion- dermatofibroma, benign lesion. Mid back with irritated seborrheic keratosis, topical triamcinolone 0.1% bid   - monitor for signs of trach site bleeding, contact ENT if severe bleeding noted   - DVT ppx- lovenox  - Dispo- full code.

## 2023-11-19 NOTE — PROGRESS NOTE ADULT - PROBLEM SELECTOR PLAN 3
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  11/18 RR increased to 14 due to hypercarbia from trach collar trial  - Tolerates intermittent PSV 15/5 during day/ Vent HS  - Airway Clearance: Duoneb, Hypersal, Chest PT, PRN suctioning   - Maintain 02 sat > 92%  Tracheal Bleeding (Intermittent)-->resolved since admission   -S/p CT Angio neck: No evidence of active bleeding around tracheostomy site. No hematoma.  No collection   - Seen by ENT; Kath and b/l bronchi visualized without any trauma. small amount of blood tinged secretions resting at beginning of right bronchus not actively bleeding.  - 11/3 trach changed to #9 Portex by ENT  - 11/17:  Due to persistent air leak and volume loss on vent, trach again changed by ENT to #8 distal cuffed shiley.  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

## 2023-11-19 NOTE — PROGRESS NOTE ADULT - SUBJECTIVE AND OBJECTIVE BOX
Patient is a 73y old  Female who presents with a chief complaint of peg tube (16 Nov 2023 06:51)      Interval Events: No events reported over night.     REVIEW OF SYSTEMS:  [ ] Positive  [X] All other systems negative  [ ] Unable to assess ROS because ________    Vital Signs Last 24 Hrs  T(C): 36.7 (11-18-23 @ 03:15), Max: 37.5 (11-17-23 @ 21:24)  T(F): 98.1 (11-18-23 @ 03:15), Max: 99.5 (11-17-23 @ 21:24)  HR: 75 (11-18-23 @ 06:06) (75 - 99)  BP: 123/61 (11-18-23 @ 03:15) (123/61 - 153/80)  RR: 12 (11-18-23 @ 03:15) (12 - 20)  SpO2: 99% (11-18-23 @ 06:06) (98% - 100%)      PHYSICAL EXAM:  HEENT:   [X] Tracheostomy:  #8 distal XLT Shiley  [X] 4mm PERRL B/L  [X] No oral lesions  [ ] Abnormal    SKIN  [ ] No Rash  [ ]  Abnormal  [X] pressure: Sacral wound    CARDIAC  [X] Regular  [ ] Abnormal    PULMONARY  [X] Bilateral Clear Breath Sounds  [ ] Normal Excursion  [ ] Abnormal    GI  [X] PEG      [X] +BS		              [X] Soft, nondistended, nontender	  [ ] Abnormal    MUSCULOSKELETAL                                   [  ] Bedbound                 [X] Abnormal:  Deconditioned but can partially move all limbs   [ ] Ambulatory/OOB to chair                           EXTREMITIES                                         [X] Normal  [ ]Edema                           NEUROLOGIC  [ ] Normal, non focal  [X] Focal findings:  Alert and Oriented x2; responds appropriately top questions by mouthing and able to phonate while on vent; B/L foot drop with partial ability to dorsiflex; moves both arms with minimal hand dexterity B/L    PSYCHIATRIC  [X] Alert; somewhat anxious at times but mostly appropriate  [ ] Sedated	 [ ]Agitated    :  Mcbride: [ ] Yes, if yes: Date of Placement:                   [X] No    LINES: Central Lines [ ] Yes, if yes: Date of Placement                                     [X] No      HOSPITAL MEDICATIONS:  MEDICATIONS  (STANDING):  albuterol/ipratropium for Nebulization 3 milliLiter(s) Nebulizer every 6 hours  artificial  tears Solution 2 Drop(s) Both EYES four times a day  ascorbic acid 500 milliGRAM(s) Oral daily  bacitracin   Ointment 1 Application(s) Topical two times a day  bisacodyl Suppository 10 milliGRAM(s) Rectal once  calcium carbonate    500 mG (Tums) Chewable 1 Tablet(s) Chew every 12 hours  cephalexin 500 milliGRAM(s) Oral every 6 hours  chlorhexidine 0.12% Liquid 15 milliLiter(s) Oral Mucosa every 12 hours  chlorhexidine 4% Liquid 1 Application(s) Topical <User Schedule>  ciprofloxacin     Tablet 500 milliGRAM(s) Oral every 12 hours  dextrose 50% Injectable 50 milliLiter(s) IV Push once  enoxaparin Injectable 40 milliGRAM(s) SubCutaneous every 24 hours  escitalopram 10 milliGRAM(s) Oral daily  fentaNYL   Patch  25 MICROgram(s)/Hr 1 Patch Transdermal every 72 hours  gabapentin Solution 100 milliGRAM(s) Enteral Tube at bedtime  insulin glargine Injectable (LANTUS) 14 Unit(s) SubCutaneous every morning  insulin lispro (ADMELOG) corrective regimen sliding scale   SubCutaneous every 6 hours  levothyroxine 125 MICROGram(s) Oral <User Schedule>  lidocaine   4% Patch 1 Patch Transdermal daily  melatonin 3 milliGRAM(s) Oral at bedtime  multivitamin/minerals/iron Oral Solution (CENTRUM) 15 milliLiter(s) Oral daily  nystatin Powder 1 Application(s) Topical every 8 hours  oxybutynin 5 milliGRAM(s) Oral daily  pantoprazole   Suspension 40 milliGRAM(s) Enteral Tube daily  petrolatum Ophthalmic Ointment 1 Application(s) Both EYES four times a day  predniSONE   Tablet 5 milliGRAM(s) Oral daily  QUEtiapine 12.5 milliGRAM(s) Oral <User Schedule>  QUEtiapine 12.5 milliGRAM(s) Oral <User Schedule>  senna Syrup 10 milliLiter(s) Oral at bedtime  simethicone 80 milliGRAM(s) Chew four times a day  sodium chloride 3%  Inhalation 4 milliLiter(s) Inhalation every 12 hours  triamcinolone 0.1% Ointment 1 Application(s) Topical two times a day  zinc oxide 40% Paste 1 Application(s) Topical daily  zinc sulfate 220 milliGRAM(s) Oral daily    MEDICATIONS  (PRN):  acetaminophen   Oral Liquid .. 1000 milliGRAM(s) Enteral Tube every 6 hours PRN Temp greater or equal to 38C (100.4F), Mild Pain (1 - 3)  benzocaine 20% Spray 1 Spray(s) Topical four times a day PRN Cough  diphenhydrAMINE Elixir 25 milliGRAM(s) Oral every 6 hours PRN Rash and/or Itching  oxyCODONE    Solution 2.5 milliGRAM(s) Oral every 6 hours PRN Moderate Pain (4 - 6)  sodium chloride 0.65% Nasal 1 Spray(s) Both Nostrils every 6 hours PRN Nasal Congestion      LABS:                          8.9    9.19  )-----------( 124      ( 18 Nov 2023 09:15 )             30.4     11-18    137  |  99  |  29<H>  ----------------------------<  224<H>  4.1   |  28  |  <0.30<L>    Ca    10.0      18 Nov 2023 09:15  Mg     1.8     11-18    TPro  7.4  /  Alb  3.3  /  TBili  0.3  /  DBili  x   /  AST  19  /  ALT  18  /  AlkPhos  48  11-18                CAPILLARY BLOOD GLUCOSE    MICROBIOLOGY:     RADIOLOGY:  [ ] Reviewed and interpreted by me    Mode: AC/ CMV (Assist Control/ Continuous Mandatory Ventilation)  RR (machine): 12  TV (machine): 300  FiO2: 30  PEEP: 5  ITime: 1  MAP: 8  PIP: 24

## 2023-11-20 LAB
GLUCOSE BLDC GLUCOMTR-MCNC: 195 MG/DL — HIGH (ref 70–99)
GLUCOSE BLDC GLUCOMTR-MCNC: 195 MG/DL — HIGH (ref 70–99)
GLUCOSE BLDC GLUCOMTR-MCNC: 201 MG/DL — HIGH (ref 70–99)
GLUCOSE BLDC GLUCOMTR-MCNC: 201 MG/DL — HIGH (ref 70–99)
GLUCOSE BLDC GLUCOMTR-MCNC: 219 MG/DL — HIGH (ref 70–99)
GLUCOSE BLDC GLUCOMTR-MCNC: 219 MG/DL — HIGH (ref 70–99)
GLUCOSE BLDC GLUCOMTR-MCNC: 240 MG/DL — HIGH (ref 70–99)
GLUCOSE BLDC GLUCOMTR-MCNC: 240 MG/DL — HIGH (ref 70–99)
GLUCOSE BLDC GLUCOMTR-MCNC: 273 MG/DL — HIGH (ref 70–99)
GLUCOSE BLDC GLUCOMTR-MCNC: 273 MG/DL — HIGH (ref 70–99)

## 2023-11-20 PROCEDURE — 99233 SBSQ HOSP IP/OBS HIGH 50: CPT

## 2023-11-20 RX ORDER — CIPROFLOXACIN LACTATE 400MG/40ML
400 VIAL (ML) INTRAVENOUS EVERY 12 HOURS
Refills: 0 | Status: DISCONTINUED | OUTPATIENT
Start: 2023-11-20 | End: 2023-11-24

## 2023-11-20 RX ADMIN — Medication 500 MILLIGRAM(S): at 12:59

## 2023-11-20 RX ADMIN — Medication 1 APPLICATION(S): at 18:11

## 2023-11-20 RX ADMIN — LIDOCAINE 1 PATCH: 4 CREAM TOPICAL at 19:13

## 2023-11-20 RX ADMIN — LIDOCAINE 1 PATCH: 4 CREAM TOPICAL at 01:44

## 2023-11-20 RX ADMIN — SIMETHICONE 80 MILLIGRAM(S): 80 TABLET, CHEWABLE ORAL at 06:15

## 2023-11-20 RX ADMIN — Medication 1 APPLICATION(S): at 22:32

## 2023-11-20 RX ADMIN — CHLORHEXIDINE GLUCONATE 15 MILLILITER(S): 213 SOLUTION TOPICAL at 06:16

## 2023-11-20 RX ADMIN — ZINC SULFATE TAB 220 MG (50 MG ZINC EQUIVALENT) 220 MILLIGRAM(S): 220 (50 ZN) TAB at 12:59

## 2023-11-20 RX ADMIN — Medication 500 MILLIGRAM(S): at 22:31

## 2023-11-20 RX ADMIN — Medication 5 MILLIGRAM(S): at 13:00

## 2023-11-20 RX ADMIN — Medication 500 MILLIGRAM(S): at 06:15

## 2023-11-20 RX ADMIN — Medication 1 APPLICATION(S): at 13:01

## 2023-11-20 RX ADMIN — Medication 1 TABLET(S): at 18:10

## 2023-11-20 RX ADMIN — Medication 3 MILLIGRAM(S): at 22:32

## 2023-11-20 RX ADMIN — Medication 200 MILLIGRAM(S): at 22:33

## 2023-11-20 RX ADMIN — SIMETHICONE 80 MILLIGRAM(S): 80 TABLET, CHEWABLE ORAL at 12:59

## 2023-11-20 RX ADMIN — ESCITALOPRAM OXALATE 10 MILLIGRAM(S): 10 TABLET, FILM COATED ORAL at 13:00

## 2023-11-20 RX ADMIN — Medication 2 DROP(S): at 13:00

## 2023-11-20 RX ADMIN — QUETIAPINE FUMARATE 12.5 MILLIGRAM(S): 200 TABLET, FILM COATED ORAL at 16:22

## 2023-11-20 RX ADMIN — SIMETHICONE 80 MILLIGRAM(S): 80 TABLET, CHEWABLE ORAL at 18:10

## 2023-11-20 RX ADMIN — ZINC OXIDE 1 APPLICATION(S): 200 OINTMENT TOPICAL at 13:01

## 2023-11-20 RX ADMIN — Medication 500 MILLIGRAM(S): at 18:10

## 2023-11-20 RX ADMIN — Medication 3 MILLILITER(S): at 17:16

## 2023-11-20 RX ADMIN — Medication 1 APPLICATION(S): at 06:14

## 2023-11-20 RX ADMIN — Medication 1 APPLICATION(S): at 06:16

## 2023-11-20 RX ADMIN — CHLORHEXIDINE GLUCONATE 1 APPLICATION(S): 213 SOLUTION TOPICAL at 06:16

## 2023-11-20 RX ADMIN — Medication 2: at 01:00

## 2023-11-20 RX ADMIN — Medication 5 MILLIGRAM(S): at 06:15

## 2023-11-20 RX ADMIN — Medication 3 MILLILITER(S): at 11:03

## 2023-11-20 RX ADMIN — LIDOCAINE 1 PATCH: 4 CREAM TOPICAL at 00:35

## 2023-11-20 RX ADMIN — CHLORHEXIDINE GLUCONATE 15 MILLILITER(S): 213 SOLUTION TOPICAL at 18:11

## 2023-11-20 RX ADMIN — Medication 125 MICROGRAM(S): at 06:20

## 2023-11-20 RX ADMIN — ENOXAPARIN SODIUM 40 MILLIGRAM(S): 100 INJECTION SUBCUTANEOUS at 06:14

## 2023-11-20 RX ADMIN — SODIUM CHLORIDE 4 MILLILITER(S): 9 INJECTION INTRAMUSCULAR; INTRAVENOUS; SUBCUTANEOUS at 06:18

## 2023-11-20 RX ADMIN — Medication 3 MILLILITER(S): at 06:18

## 2023-11-20 RX ADMIN — NYSTATIN CREAM 1 APPLICATION(S): 100000 CREAM TOPICAL at 22:33

## 2023-11-20 RX ADMIN — Medication 15 MILLILITER(S): at 12:59

## 2023-11-20 RX ADMIN — Medication 1 APPLICATION(S): at 06:17

## 2023-11-20 RX ADMIN — Medication 3 MILLILITER(S): at 00:21

## 2023-11-20 RX ADMIN — Medication 6: at 13:34

## 2023-11-20 RX ADMIN — Medication 2 DROP(S): at 06:16

## 2023-11-20 RX ADMIN — NYSTATIN CREAM 1 APPLICATION(S): 100000 CREAM TOPICAL at 06:16

## 2023-11-20 RX ADMIN — LIDOCAINE 1 PATCH: 4 CREAM TOPICAL at 13:00

## 2023-11-20 RX ADMIN — PANTOPRAZOLE SODIUM 40 MILLIGRAM(S): 20 TABLET, DELAYED RELEASE ORAL at 13:00

## 2023-11-20 RX ADMIN — QUETIAPINE FUMARATE 12.5 MILLIGRAM(S): 200 TABLET, FILM COATED ORAL at 22:32

## 2023-11-20 RX ADMIN — Medication 2 DROP(S): at 18:11

## 2023-11-20 RX ADMIN — Medication 1000 MILLIGRAM(S): at 10:30

## 2023-11-20 RX ADMIN — Medication 2 DROP(S): at 22:32

## 2023-11-20 RX ADMIN — SIMETHICONE 80 MILLIGRAM(S): 80 TABLET, CHEWABLE ORAL at 22:31

## 2023-11-20 RX ADMIN — INSULIN GLARGINE 14 UNIT(S): 100 INJECTION, SOLUTION SUBCUTANEOUS at 06:20

## 2023-11-20 RX ADMIN — Medication 4: at 06:21

## 2023-11-20 RX ADMIN — NYSTATIN CREAM 1 APPLICATION(S): 100000 CREAM TOPICAL at 13:35

## 2023-11-20 RX ADMIN — GABAPENTIN 100 MILLIGRAM(S): 400 CAPSULE ORAL at 22:32

## 2023-11-20 RX ADMIN — Medication 1 TABLET(S): at 06:15

## 2023-11-20 RX ADMIN — Medication 3 MILLILITER(S): at 23:46

## 2023-11-20 RX ADMIN — SODIUM CHLORIDE 4 MILLILITER(S): 9 INJECTION INTRAMUSCULAR; INTRAVENOUS; SUBCUTANEOUS at 17:29

## 2023-11-20 RX ADMIN — Medication 1000 MILLIGRAM(S): at 09:33

## 2023-11-20 RX ADMIN — Medication 4: at 18:12

## 2023-11-20 RX ADMIN — Medication 1 APPLICATION(S): at 18:12

## 2023-11-20 NOTE — PROGRESS NOTE ADULT - SUBJECTIVE AND OBJECTIVE BOX
Patient is a 73y old  Female who presents with a chief complaint of peg tube (16 Nov 2023 06:51)      SUBJECTIVE / OVERNIGHT EVENTS: no new events  She was tried on trach collar, but had to be placed back on [facility] vent due to hypercapnea.    MEDICATIONS  (STANDING):  albuterol/ipratropium for Nebulization 3 milliLiter(s) Nebulizer every 6 hours  artificial  tears Solution 2 Drop(s) Both EYES four times a day  ascorbic acid 500 milliGRAM(s) Oral daily  bacitracin   Ointment 1 Application(s) Topical two times a day  bisacodyl Suppository 10 milliGRAM(s) Rectal once  calcium carbonate    500 mG (Tums) Chewable 1 Tablet(s) Chew every 12 hours  cephalexin 500 milliGRAM(s) Oral every 6 hours  chlorhexidine 0.12% Liquid 15 milliLiter(s) Oral Mucosa every 12 hours  chlorhexidine 4% Liquid 1 Application(s) Topical <User Schedule>  ciprofloxacin     Tablet 500 milliGRAM(s) Oral every 12 hours  dextrose 50% Injectable 50 milliLiter(s) IV Push once  enoxaparin Injectable 40 milliGRAM(s) SubCutaneous every 24 hours  escitalopram 10 milliGRAM(s) Oral daily  fentaNYL   Patch  25 MICROgram(s)/Hr 1 Patch Transdermal every 72 hours  gabapentin Solution 100 milliGRAM(s) Enteral Tube at bedtime  insulin glargine Injectable (LANTUS) 14 Unit(s) SubCutaneous every morning  insulin lispro (ADMELOG) corrective regimen sliding scale   SubCutaneous every 6 hours  levothyroxine 125 MICROGram(s) Oral <User Schedule>  lidocaine   4% Patch 1 Patch Transdermal daily  melatonin 3 milliGRAM(s) Oral at bedtime  multivitamin/minerals/iron Oral Solution (CENTRUM) 15 milliLiter(s) Oral daily  nystatin Powder 1 Application(s) Topical every 8 hours  oxybutynin 5 milliGRAM(s) Oral daily  pantoprazole   Suspension 40 milliGRAM(s) Enteral Tube daily  petrolatum Ophthalmic Ointment 1 Application(s) Both EYES four times a day  predniSONE   Tablet 5 milliGRAM(s) Oral daily  QUEtiapine 12.5 milliGRAM(s) Oral <User Schedule>  QUEtiapine 12.5 milliGRAM(s) Oral <User Schedule>  senna Syrup 10 milliLiter(s) Oral at bedtime  simethicone 80 milliGRAM(s) Chew four times a day  sodium chloride 3%  Inhalation 4 milliLiter(s) Inhalation every 12 hours  triamcinolone 0.1% Ointment 1 Application(s) Topical two times a day  zinc oxide 40% Paste 1 Application(s) Topical daily  zinc sulfate 220 milliGRAM(s) Oral daily    MEDICATIONS  (PRN):  acetaminophen   Oral Liquid .. 1000 milliGRAM(s) Enteral Tube every 6 hours PRN Temp greater or equal to 38C (100.4F), Mild Pain (1 - 3)  benzocaine 20% Spray 1 Spray(s) Topical four times a day PRN Cough  diphenhydrAMINE Elixir 25 milliGRAM(s) Oral every 6 hours PRN Rash and/or Itching  oxyCODONE    Solution 2.5 milliGRAM(s) Oral every 6 hours PRN Moderate Pain (4 - 6)  sodium chloride 0.65% Nasal 1 Spray(s) Both Nostrils every 6 hours PRN Nasal Congestion      ICU Vital Signs Last 24 Hrs  T(C): 37.6 (20 Nov 2023 05:55), Max: 37.8 (19 Nov 2023 20:39)  T(F): 99.7 (20 Nov 2023 05:55), Max: 100.1 (19 Nov 2023 20:39)  HR: 88 (20 Nov 2023 11:38) (78 - 102)  BP: 185/95 (20 Nov 2023 05:55) (106/69 - 185/95)  BP(mean): --  ABP: --  ABP(mean): --  RR: 18 (20 Nov 2023 05:55) (15 - 20)  SpO2: 100% (20 Nov 2023 11:38) (93% - 100%)    O2 Parameters below as of 20 Nov 2023 11:38  Patient On (Oxygen Delivery Method): ventilator              POCT Blood Glucose.: 173 mg/dL (18 Nov 2023 17:25)  POCT Blood Glucose.: 214 mg/dL (18 Nov 2023 12:09)  POCT Blood Glucose.: 149 mg/dL (18 Nov 2023 05:52)  POCT Blood Glucose.: 223 mg/dL (18 Nov 2023 00:27)  POCT Blood Glucose.: 202 mg/dL (17 Nov 2023 18:04)    I&O's Summary    17 Nov 2023 07:01  -  18 Nov 2023 07:00  --------------------------------------------------------  IN: 1300 mL / OUT: 950 mL / NET: 350 mL    18 Nov 2023 07:01  -  18 Nov 2023 17:32  --------------------------------------------------------  IN: 0 mL / OUT: 400 mL / NET: -400 mL        PHYSICAL EXAM:  GENERAL: NAD, well-developed  HEAD:  Atraumatic, Normocephalic  EYES: EOMI, PERRLA, conjunctiva and sclera clear  NECK: Supple, No [facility]VD. [facility]  CHEST/LUNG: Clear to auscultation bilaterally; No wheeze  HEART: Regular rate and rhythm; No murmurs, rubs, or gallops  ABDOMEN: Soft, Nontender, Nondistended; Bowel sounds present  EXTREMITIES:  2+ Peripheral Pulses, No clubbing, cyanosis, or edema  PSYCH: AAOx3  NEUROLOGY: non-focal. Functionally quadriplegic  SKIN: No rashes or lesions    LABS:                        8.9    9.19  )-----------( 124      ( 18 Nov 2023 09:15 )             30.4     11-18    137  |  99  |  29<H>  ----------------------------<  224<H>  4.1   |  28  |  <0.30<L>    Ca    10.0      18 Nov 2023 09:15  Mg     1.8     11-18    TPro  7.4  /  Alb  3.3  /  TBili  0.3  /  DBili  x   /  AST  19  /  ALT  18  /  AlkPhos  48  11-18          Urinalysis Basic - ( 18 Nov 2023 09:15 )    Color: x / Appearance: x / SG: x / pH: x  Gluc: 224 mg/dL / Ketone: x  / Bili: x / Urobili: x   Blood: x / Protein: x / Nitrite: x   Leuk Esterase: x / RBC: x / WBC x   Sq Epi: x / Non Sq Epi: x / Bacteria: x        RADIOLOGY & ADDITIONAL TESTS:    Imaging Personally Reviewed:    Consultant(s) Notes Reviewed:      Care Discussed with Consultants/Other Providers:

## 2023-11-20 NOTE — PROGRESS NOTE ADULT - PROBLEM SELECTOR PLAN 1
Found w/ Bilateral PNA vs Atelectasis, and Possible OM Sacrum   S/p Fevers at home, Afebrile since admission   - S/p Chest CT (10/28):  c/w Bilateral PNA vs Atelectasis   - Started Empirically on Vanco, Aztreonam   - Blood cx: NGTD   - urine culture: negative   - Sputum cx + Pseudomonas aeruginosa, S. aureus  - Sputum Cx 11/6: + PSA and Serratia, Cefepime 11/8 -->11/15 Found w/ Bilateral PNA vs Atelectasis, and Possible OM Sacrum   S/p Fevers at home, Afebrile since admission   - S/p Chest CT (10/28):  c/w Bilateral PNA vs Atelectasis   - Started Empirically on Vanco, Aztreonam   - Blood cx: NGTD   - urine culture: negative   - Sputum cx + Pseudomonas aeruginosa, S. aureus  - Sputum Cx 11/6: + PSA and Serratia, Cefepime 11/8 -->11/15  - HDS, stable vitals signs, afebrile

## 2023-11-20 NOTE — PROGRESS NOTE ADULT - ASSESSMENT
72 y/o F with PMHx of T2DM, HTN, HLD, anemia, hypothyroidism, RA, fibromyalgia, remote cerebral aneurysm repair, acute hypercapnic respiratory failure now with tracheostomy, PEG tube, and bedbound (trach change 8/18, PEG change 8/14), sacral pressure ulcer, BIBEMS from home for 6 day hx of bleeding from the tracheostomy and PEG tube with associated abdominal pain, recent history of auditory and visual hallucinations, and with chronic sacral decubitus ulcer. Exam notable for mild abdominal distention with LLQ and RLQ tenderness, tracheostomy in place with no obvious bleeding, PEG tube with scant amount of dried blood, at baseline neurologic status. Imaging showing no active bleeding around tracheostomy site, however was notable for mild thickening along PEG tube tract, sacral ulcer extending to the coccyx suggestive of possible osteomyelitis, and bibasilar patchy lung opacities with volume loss. Patient admitted for respiratory support, wound care of tracheostomy and PEG, and management of sacral osteomyelitis. No further Trach and peg site  bleeding noted since admission.  Pelvic MRI imaging also suggestive of Acute OM. Patient placed on long-term antibiotics with Infectious disease guidance.  Additionally treated with a 7d course of Cefepime due to PSA and Serratia tracheitis on 11/8-->11/15 and then resumed cipro/keflex.  Hospital course c/b Delirium for which Seroquel was added and home Lexapro continued. Tracheostomy changed to #9 Cuffed [place] by ENT 11/3.  Despite trach change patient remained with persistent air leak.  On 11/19, the trach was again changed due to leak and loss of volumes on vent.  Trach was changed to #8 distal cuffed Shiley.  Patient seen by Dermatology for back lesions.  One diagnosed as a seborrheic keratitis and the other a dermatofibroma. 1    11/18:  Attempted trach collar which was tolerated for over 2.5 hours  however ABG revealed acute respiratory acidosis.  Thus patient placed back on vent.  Subsequent ABG with mild improvement thus RR increased from 12 to 16.              72 y/o F with PMHx of T2DM, HTN, HLD, anemia, hypothyroidism, RA, fibromyalgia, remote cerebral aneurysm repair, acute hypercapnic respiratory failure now with tracheostomy, PEG tube, and bedbound (trach change 8/18, PEG change 8/14), sacral pressure ulcer, BIBEMS from home for 6 day hx of bleeding from the tracheostomy and PEG tube with associated abdominal pain, recent history of auditory and visual hallucinations, and with chronic sacral decubitus ulcer. Exam notable for mild abdominal distention with LLQ and RLQ tenderness, tracheostomy in place with no obvious bleeding, PEG tube with scant amount of dried blood, at baseline neurologic status. Imaging showing no active bleeding around tracheostomy site, however was notable for mild thickening along PEG tube tract, sacral ulcer extending to the coccyx suggestive of possible osteomyelitis, and bibasilar patchy lung opacities with volume loss. Patient admitted for respiratory support, wound care of tracheostomy and PEG, and management of sacral osteomyelitis. No further Trach and peg site  bleeding noted since admission.  Pelvic MRI imaging also suggestive of Acute OM. Patient placed on long-term antibiotics with Infectious disease guidance.  Additionally treated with a 7d course of Cefepime due to PSA and Serratia tracheitis on 11/8-->11/15 and then resumed cipro/keflex.  Hospital course c/b Delirium for which Seroquel was added and home Lexapro continued. Tracheostomy changed to #9 Cuffed Portex by ENT 11/3.  Despite trach change patient remained with persistent air leak.  On 11/19, the trach was again changed due to leak and loss of volumes on vent.  Trach was changed to #8 distal cuffed Shiley.  Patient seen by Dermatology for back lesions.  One diagnosed as a seborrheic keratitis and the other a dermatofibroma. 1    11/18:  Attempted trach collar which was tolerated for over 2.5 hours  however ABG revealed acute respiratory acidosis.  Thus patient placed back on vent.  Subsequent ABG with mild improvement thus RR increased from 12 to 16.     11/20: c/w Trial of CPAP w high setting, will wean as tolerated. Vent as needed . - c/w current abx. as per ID pharmacist, multiple drug interactions - recommend to change Cipro to IV

## 2023-11-20 NOTE — PROGRESS NOTE ADULT - PROBLEM SELECTOR PLAN 7
- Home Levemir 14 units in morning held on admission as noted w/ hypoglycemia   -Continue home regimen with sliding scale - s/p Home Levemir 14 units in morning held on admission as noted w/ hypoglycemia   - c/w Lantus 14 units qAM  - Continue home regimen with sliding scale

## 2023-11-20 NOTE — PROGRESS NOTE ADULT - PROBLEM SELECTOR PLAN 6
-Continue home Prednisone regimen  *fibromyalgia  -continue home Gabapentin 100mg daily  -continue home Fentanyl 25mcg Q72H patches  - added Lidocaine patch   - Oxycodone 2.5mg q6 hrs PRN (in addition to Tylenol PRN) -Continue home Prednisone regimen  *fibromyalgia  -continue home Gabapentin 100mg daily  -continue home Fentanyl 25mcg Q72H patches  - c/w Lidocaine patch   - Oxycodone 2.5mg q6 hrs PRN (in addition to Tylenol PRN)

## 2023-11-20 NOTE — PROGRESS NOTE ADULT - PROBLEM SELECTOR PLAN 1
MRI confirms acute osteomyelitis of sacrum.  Sputum culture showing Pseudomonas and serratia. Antibiotics comleted as prescribed

## 2023-11-20 NOTE — PROGRESS NOTE ADULT - PROBLEM SELECTOR PLAN 2
On chronic vent with trach    Hypothyroidism: T4 is slightly low. and tsh is elevated. will raise synthroid to 125 mcg.  Management as per [facility] pulm team

## 2023-11-20 NOTE — PROGRESS NOTE ADULT - NS ATTEND AMEND GEN_ALL_CORE FT
73F with a h/o DM, HTN, HLD, anemia, hypothyroidism, RA, fibromyalgia, remote cerebral aneurysm with repair, hypercapnic respiratory failure s/p tracheostomy/PEG, bedbound, sacral pressure ulcer. Admitted w/ bleeding from tracheostomy and PEG w/ associated abdominal pain, recent hx of auditory/visual hallucinations. Since admission, no further bleeding noted from either trach or PEG. Imaging showed mild thickening along PEG tube tract and sacral ulcer extending to coccyx suggestive of OM.        # Osteomyelitis  # Chronic hypoxemic RF  # oropharyngeal dysphagia  # acute on chronic pain  # Trach/Peg bleeding  # Tracheitis with Pseudomonas and serratia  # Hx of hallucination, anxiety    - mental status appropriate today, attempting to follow commands and reports chronic HA and leg pain, very weakly sampson  3+  - MRI showing sacral Osteo,  c/w wound care, no plans for surgery. Will need prolonged course of abx. Afebrile, cultures from 11/12 NGTD. s/p  course of Cefepime on 11/15 for sputum culture with PsA and serratia,   - now on Cipro and Keflex f/u ID reccs   - c/w Airway clearance -  Duonebs and 3% Hypersal, chest PT.   - PS trials during the day, vent at night, and will need vent on discharge given hypercapnic episodes previously.    - family requested S/S eval but unable to given dependence on vent  - C/W pain control, multifactorial 2/2 to fibromyalgia as well as now osteo with pressure ulcer.    - Peg irritation seen by GI, no recs for further interventions. H/H stable  - Thrombocytopenia - improved   - Appreciate  follow up for delirium and hallucinations, patient calm and appropriate today - c/w Seroquel 12.5 mg at 3 pm and another 12.5 mg at 8 pm. Melatonin QHS for sleep. c/w Lexapro at current dose.  - Corneal erythema improved, no evidence of discharge, completed a course of Cipro eye drops, c/w artificial tears  - Appreciate derm consult for right back skin lesion- dermatofibroma, benign lesion. Mid back with irritated seborrheic keratosis, topical triamcinolone 0.1% bid   - monitor for signs of trach site bleeding, none noted today, contact ENT if  bleeding noted   - DVT ppx- lovenox  - Dispo- full code.

## 2023-11-20 NOTE — PROGRESS NOTE ADULT - PROBLEM SELECTOR PLAN 2
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   - S/p CT abd/pelvis (10/28): Sacral decubitus ulcer extending to the coccyx with osseous remodeling and pelvic MRI or bone scan to recc to exclude osteomyelitis.  - MRI pelvis 10/30 with Osteomyelitis, c/w current Cefepime for 7 days, Vancomycin d/marli   - Elevated, ESR 85   - Elevated,    - Wound care on board  -11/10:  Resumed Cipro 500mg q 12 and Keflex 500mg q 6 until 12/10. 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   - S/p CT abd/pelvis (10/28): Sacral decubitus ulcer extending to the coccyx with osseous remodeling and pelvic MRI or bone scan to recc to exclude osteomyelitis.  - MRI pelvis 10/30 with Osteomyelitis, c/w current Cefepime for 7 days, Vancomycin d/marli   - Elevated, ESR 85   - Elevated,    - Wound care on board  -11/10:  resumed Cipro 500mg q 12 and Keflex 500mg q 6 until 12/10.  -11/20: Changed to IV Cipro 400 q12 as recommended by ID pharmacist due to multiple drug interactions when given via PEG

## 2023-11-20 NOTE — PROGRESS NOTE ADULT - SUBJECTIVE AND OBJECTIVE BOX
CHIEF COMPLAINT: Patient is a 73y old  Female who presents with a chief complaint of peg tube (16 Nov 2023 06:51)    Interval Events:    REVIEW OF SYSTEMS:  [ ] All other systems negative  [ ] Unable to assess ROS because ________    Mode: AC/ CMV (Assist Control/ Continuous Mandatory Ventilation), RR (machine): 12, TV (machine): 300, FiO2: 30, PEEP: 5, ITime: 1, MAP: 9, PIP: 29  Arterial Blood Gas:  11-19 @ 12:50  7.41/46/121/29/98.8/3.8  ABG lactate: --  Arterial Blood Gas:  11-18 @ 16:40  7.32/63/40/32/63.9/5.3  ABG lactate: --  Arterial Blood Gas:  11-18 @ 14:30  7.25/70/121/31/99.6/1.5  ABG lactate: --    OBJECTIVE:  ICU Vital Signs Last 24 Hrs  T(C): 37.6 (20 Nov 2023 05:55), Max: 37.8 (19 Nov 2023 20:39)  T(F): 99.7 (20 Nov 2023 05:55), Max: 100.1 (19 Nov 2023 20:39)  HR: 86 (20 Nov 2023 06:22) (79 - 102)  BP: 185/95 (20 Nov 2023 05:55) (106/69 - 185/95)  BP(mean): --  ABP: --  ABP(mean): --  RR: 18 (20 Nov 2023 05:55) (15 - 20)  SpO2: 99% (20 Nov 2023 06:22) (93% - 100%)    O2 Parameters below as of 20 Nov 2023 06:22  Patient On (Oxygen Delivery Method): ventilator    Mode: AC/ CMV (Assist Control/ Continuous Mandatory Ventilation), RR (machine): 12, TV (machine): 300, FiO2: 30, PEEP: 5, ITime: 1, MAP: 9, PIP: 29    11-19 @ 07:01  -  11-20 @ 07:00  --------------------------------------------------------  IN: 590 mL / OUT: 200 mL / NET: 390 mL    CAPILLARY BLOOD GLUCOSE    POCT Blood Glucose.: 201 mg/dL (20 Nov 2023 06:13)    HOSPITAL MEDICATIONS:  MEDICATIONS  (STANDING):  albuterol/ipratropium for Nebulization 3 milliLiter(s) Nebulizer every 6 hours  artificial  tears Solution 2 Drop(s) Both EYES four times a day  ascorbic acid 500 milliGRAM(s) Oral daily  bacitracin   Ointment 1 Application(s) Topical two times a day  bisacodyl Suppository 10 milliGRAM(s) Rectal once  calcium carbonate    500 mG (Tums) Chewable 1 Tablet(s) Chew every 12 hours  cephalexin 500 milliGRAM(s) Oral every 6 hours  chlorhexidine 0.12% Liquid 15 milliLiter(s) Oral Mucosa every 12 hours  chlorhexidine 4% Liquid 1 Application(s) Topical <User Schedule>  ciprofloxacin     Tablet 500 milliGRAM(s) Oral every 12 hours  dextrose 50% Injectable 50 milliLiter(s) IV Push once  enoxaparin Injectable 40 milliGRAM(s) SubCutaneous every 24 hours  escitalopram 10 milliGRAM(s) Oral daily  fentaNYL   Patch  25 MICROgram(s)/Hr 1 Patch Transdermal every 72 hours  gabapentin Solution 100 milliGRAM(s) Enteral Tube at bedtime  insulin glargine Injectable (LANTUS) 14 Unit(s) SubCutaneous every morning  insulin lispro (ADMELOG) corrective regimen sliding scale   SubCutaneous every 6 hours  levothyroxine 125 MICROGram(s) Oral <User Schedule>  lidocaine   4% Patch 1 Patch Transdermal daily  melatonin 3 milliGRAM(s) Oral at bedtime  multivitamin/minerals/iron Oral Solution (CENTRUM) 15 milliLiter(s) Oral daily  nystatin Powder 1 Application(s) Topical every 8 hours  oxybutynin 5 milliGRAM(s) Oral daily  pantoprazole   Suspension 40 milliGRAM(s) Enteral Tube daily  petrolatum Ophthalmic Ointment 1 Application(s) Both EYES four times a day  polyethylene glycol 3350 17 Gram(s) Oral <User Schedule>  predniSONE   Tablet 5 milliGRAM(s) Oral daily  QUEtiapine 12.5 milliGRAM(s) Oral <User Schedule>  QUEtiapine 12.5 milliGRAM(s) Oral <User Schedule>  senna Syrup 10 milliLiter(s) Oral at bedtime  simethicone 80 milliGRAM(s) Chew four times a day  sodium chloride 3%  Inhalation 4 milliLiter(s) Inhalation every 12 hours  triamcinolone 0.1% Ointment 1 Application(s) Topical two times a day  zinc oxide 40% Paste 1 Application(s) Topical daily  zinc sulfate 220 milliGRAM(s) Oral daily    MEDICATIONS  (PRN):  acetaminophen   Oral Liquid .. 1000 milliGRAM(s) Enteral Tube every 6 hours PRN Temp greater or equal to 38C (100.4F), Mild Pain (1 - 3)  benzocaine 20% Spray 1 Spray(s) Topical four times a day PRN Cough  diphenhydrAMINE Elixir 25 milliGRAM(s) Oral every 6 hours PRN Rash and/or Itching  oxyCODONE    Solution 2.5 milliGRAM(s) Oral every 6 hours PRN Moderate Pain (4 - 6)  sodium chloride 0.65% Nasal 1 Spray(s) Both Nostrils every 6 hours PRN Nasal Congestion      LABS:    Arterial Blood Gas:  11-19 @ 12:50  7.41/46/121/29/98.8/3.8  ABG lactate: --  Arterial Blood Gas:  11-18 @ 16:40  7.32/63/40/32/63.9/5.3  ABG lactate: --  Arterial Blood Gas:  11-18 @ 14:30  7.25/70/121/31/99.6/1.5  ABG lactate: --    PAST MEDICAL & SURGICAL HISTORY:  Diabetes    Rheumatoid arthritis    Fibromyalgia      Hypothyroid    Hypertension    H/O tracheostomy    PEG (percutaneous endoscopic gastrostomy) status    FAMILY HISTORY:    Social History:  lives at home with family  dependent for all ADL  no toxic habits  FULL CODE (28 Oct 2023 04:18)    RADIOLOGY:  [ ] Reviewed and interpreted by me    PULMONARY FUNCTION TESTS:    EKG: CHIEF COMPLAINT: Patient is a 73y old  Female who presents with a chief complaint of peg tube (16 Nov 2023 06:51)    Interval Events: None reported overnight. Clinically unchanged. No new complaints. Plan to continue with current Abx regimen until 12/10. VSS, and medications reviewed.     REVIEW OF SYSTEMS:  See above  [x] All other systems negative    Mode: AC/ CMV (Assist Control/ Continuous Mandatory Ventilation), RR (machine): 12, TV (machine): 300, FiO2: 30, PEEP: 5, ITime: 1, MAP: 9, PIP: 29  Arterial Blood Gas:  11-19 @ 12:50  7.41/46/121/29/98.8/3.8  ABG lactate: --  Arterial Blood Gas:  11-18 @ 16:40  7.32/63/40/32/63.9/5.3  ABG lactate: --  Arterial Blood Gas:  11-18 @ 14:30  7.25/70/121/31/99.6/1.5  ABG lactate: --    OBJECTIVE:  ICU Vital Signs Last 24 Hrs  T(C): 37.6 (20 Nov 2023 05:55), Max: 37.8 (19 Nov 2023 20:39)  T(F): 99.7 (20 Nov 2023 05:55), Max: 100.1 (19 Nov 2023 20:39)  HR: 86 (20 Nov 2023 06:22) (79 - 102)  BP: 185/95 (20 Nov 2023 05:55) (106/69 - 185/95)  BP(mean): --  ABP: --  ABP(mean): --  RR: 18 (20 Nov 2023 05:55) (15 - 20)  SpO2: 99% (20 Nov 2023 06:22) (93% - 100%)    O2 Parameters below as of 20 Nov 2023 06:22  Patient On (Oxygen Delivery Method): ventilator    Mode: AC/ CMV (Assist Control/ Continuous Mandatory Ventilation), RR (machine): 12, TV (machine): 300, FiO2: 30, PEEP: 5, ITime: 1, MAP: 9, PIP: 29    11-19 @ 07:01  -  11-20 @ 07:00  --------------------------------------------------------  IN: 590 mL / OUT: 200 mL / NET: 390 mL    CAPILLARY BLOOD GLUCOSE    POCT Blood Glucose.: 201 mg/dL (20 Nov 2023 06:13)    HOSPITAL MEDICATIONS:  MEDICATIONS  (STANDING):  albuterol/ipratropium for Nebulization 3 milliLiter(s) Nebulizer every 6 hours  artificial  tears Solution 2 Drop(s) Both EYES four times a day  ascorbic acid 500 milliGRAM(s) Oral daily  bacitracin   Ointment 1 Application(s) Topical two times a day  bisacodyl Suppository 10 milliGRAM(s) Rectal once  calcium carbonate    500 mG (Tums) Chewable 1 Tablet(s) Chew every 12 hours  cephalexin 500 milliGRAM(s) Oral every 6 hours  chlorhexidine 0.12% Liquid 15 milliLiter(s) Oral Mucosa every 12 hours  chlorhexidine 4% Liquid 1 Application(s) Topical <User Schedule>  ciprofloxacin     Tablet 500 milliGRAM(s) Oral every 12 hours  dextrose 50% Injectable 50 milliLiter(s) IV Push once  enoxaparin Injectable 40 milliGRAM(s) SubCutaneous every 24 hours  escitalopram 10 milliGRAM(s) Oral daily  fentaNYL   Patch  25 MICROgram(s)/Hr 1 Patch Transdermal every 72 hours  gabapentin Solution 100 milliGRAM(s) Enteral Tube at bedtime  insulin glargine Injectable (LANTUS) 14 Unit(s) SubCutaneous every morning  insulin lispro (ADMELOG) corrective regimen sliding scale   SubCutaneous every 6 hours  levothyroxine 125 MICROGram(s) Oral <User Schedule>  lidocaine   4% Patch 1 Patch Transdermal daily  melatonin 3 milliGRAM(s) Oral at bedtime  multivitamin/minerals/iron Oral Solution (CENTRUM) 15 milliLiter(s) Oral daily  nystatin Powder 1 Application(s) Topical every 8 hours  oxybutynin 5 milliGRAM(s) Oral daily  pantoprazole   Suspension 40 milliGRAM(s) Enteral Tube daily  petrolatum Ophthalmic Ointment 1 Application(s) Both EYES four times a day  polyethylene glycol 3350 17 Gram(s) Oral <User Schedule>  predniSONE   Tablet 5 milliGRAM(s) Oral daily  QUEtiapine 12.5 milliGRAM(s) Oral <User Schedule>  QUEtiapine 12.5 milliGRAM(s) Oral <User Schedule>  senna Syrup 10 milliLiter(s) Oral at bedtime  simethicone 80 milliGRAM(s) Chew four times a day  sodium chloride 3%  Inhalation 4 milliLiter(s) Inhalation every 12 hours  triamcinolone 0.1% Ointment 1 Application(s) Topical two times a day  zinc oxide 40% Paste 1 Application(s) Topical daily  zinc sulfate 220 milliGRAM(s) Oral daily    MEDICATIONS  (PRN):  acetaminophen   Oral Liquid .. 1000 milliGRAM(s) Enteral Tube every 6 hours PRN Temp greater or equal to 38C (100.4F), Mild Pain (1 - 3)  benzocaine 20% Spray 1 Spray(s) Topical four times a day PRN Cough  diphenhydrAMINE Elixir 25 milliGRAM(s) Oral every 6 hours PRN Rash and/or Itching  oxyCODONE    Solution 2.5 milliGRAM(s) Oral every 6 hours PRN Moderate Pain (4 - 6)  sodium chloride 0.65% Nasal 1 Spray(s) Both Nostrils every 6 hours PRN Nasal Congestion    PHYSICAL EXAM  GENERAL: Laying in bed comfortably, NAD  HEENT: +trach, area c/d/i  HEART: +s1, s2  PULM: +Normal chest rise, +coarse breath sounds noted b/l   GI: +BS, soft, obese abdomen, non tender, non distended, no peritoneal signs noted. +PEG, area c/d/i  MSK: +Trace edema in lower ext, no asymmetry, generalized weakness   NEURO:  Patient is A&O x 2. Able to answer questions and phonate.     LABS:    Arterial Blood Gas:  11-19 @ 12:50  7.41/46/121/29/98.8/3.8  ABG lactate: --  Arterial Blood Gas:  11-18 @ 16:40  7.32/63/40/32/63.9/5.3  ABG lactate: --  Arterial Blood Gas:  11-18 @ 14:30  7.25/70/121/31/99.6/1.5  ABG lactate: --    PAST MEDICAL & SURGICAL HISTORY:  Diabetes    Rheumatoid arthritis    Fibromyalgia      Hypothyroid    Hypertension    H/O tracheostomy    PEG (percutaneous endoscopic gastrostomy) status    FAMILY HISTORY:    Social History:  lives at home with family  dependent for all ADL  no toxic habits  FULL CODE (28 Oct 2023 04:18)    RADIOLOGY:  [ ] Reviewed and interpreted by me    PULMONARY FUNCTION TESTS:    EKG:

## 2023-11-20 NOTE — PROGRESS NOTE ADULT - PROBLEM SELECTOR PLAN 4
-S/p CT Abd/Pelvis: No emergent findings or active bleed; 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; 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   -Bowel regimen  - PEG Site appears macerated. Multifactorial, possible the PEG has been too tight at home.  - Peg loosened by GI at beside, no leakage or further intervention required   -Continue simethicone 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   - Bowel regimen  - PEG Site appears macerated. Multifactorial, possible the PEG has been too tight at home.  - Peg loosened by GI at beside, no leakage or further intervention required   - Continue Simethicone

## 2023-11-21 LAB
ANION GAP SERPL CALC-SCNC: 12 MMOL/L — SIGNIFICANT CHANGE UP (ref 5–17)
ANION GAP SERPL CALC-SCNC: 12 MMOL/L — SIGNIFICANT CHANGE UP (ref 5–17)
BUN SERPL-MCNC: 14 MG/DL — SIGNIFICANT CHANGE UP (ref 7–23)
BUN SERPL-MCNC: 14 MG/DL — SIGNIFICANT CHANGE UP (ref 7–23)
CALCIUM SERPL-MCNC: 9.5 MG/DL — SIGNIFICANT CHANGE UP (ref 8.4–10.5)
CALCIUM SERPL-MCNC: 9.5 MG/DL — SIGNIFICANT CHANGE UP (ref 8.4–10.5)
CHLORIDE SERPL-SCNC: 97 MMOL/L — SIGNIFICANT CHANGE UP (ref 96–108)
CHLORIDE SERPL-SCNC: 97 MMOL/L — SIGNIFICANT CHANGE UP (ref 96–108)
CO2 SERPL-SCNC: 27 MMOL/L — SIGNIFICANT CHANGE UP (ref 22–31)
CO2 SERPL-SCNC: 27 MMOL/L — SIGNIFICANT CHANGE UP (ref 22–31)
CREAT SERPL-MCNC: <0.3 MG/DL — LOW (ref 0.5–1.3)
CREAT SERPL-MCNC: <0.3 MG/DL — LOW (ref 0.5–1.3)
EGFR: 113 ML/MIN/1.73M2 — SIGNIFICANT CHANGE UP
EGFR: 113 ML/MIN/1.73M2 — SIGNIFICANT CHANGE UP
GLUCOSE BLDC GLUCOMTR-MCNC: 145 MG/DL — HIGH (ref 70–99)
GLUCOSE BLDC GLUCOMTR-MCNC: 145 MG/DL — HIGH (ref 70–99)
GLUCOSE BLDC GLUCOMTR-MCNC: 205 MG/DL — HIGH (ref 70–99)
GLUCOSE BLDC GLUCOMTR-MCNC: 205 MG/DL — HIGH (ref 70–99)
GLUCOSE BLDC GLUCOMTR-MCNC: 209 MG/DL — HIGH (ref 70–99)
GLUCOSE BLDC GLUCOMTR-MCNC: 209 MG/DL — HIGH (ref 70–99)
GLUCOSE BLDC GLUCOMTR-MCNC: 318 MG/DL — HIGH (ref 70–99)
GLUCOSE BLDC GLUCOMTR-MCNC: 318 MG/DL — HIGH (ref 70–99)
GLUCOSE SERPL-MCNC: 140 MG/DL — HIGH (ref 70–99)
GLUCOSE SERPL-MCNC: 140 MG/DL — HIGH (ref 70–99)
HCT VFR BLD CALC: 29.1 % — LOW (ref 34.5–45)
HCT VFR BLD CALC: 29.1 % — LOW (ref 34.5–45)
HGB BLD-MCNC: 8.6 G/DL — LOW (ref 11.5–15.5)
HGB BLD-MCNC: 8.6 G/DL — LOW (ref 11.5–15.5)
MAGNESIUM SERPL-MCNC: 1.7 MG/DL — SIGNIFICANT CHANGE UP (ref 1.6–2.6)
MAGNESIUM SERPL-MCNC: 1.7 MG/DL — SIGNIFICANT CHANGE UP (ref 1.6–2.6)
MCHC RBC-ENTMCNC: 27.8 PG — SIGNIFICANT CHANGE UP (ref 27–34)
MCHC RBC-ENTMCNC: 27.8 PG — SIGNIFICANT CHANGE UP (ref 27–34)
MCHC RBC-ENTMCNC: 29.6 GM/DL — LOW (ref 32–36)
MCHC RBC-ENTMCNC: 29.6 GM/DL — LOW (ref 32–36)
MCV RBC AUTO: 94.2 FL — SIGNIFICANT CHANGE UP (ref 80–100)
MCV RBC AUTO: 94.2 FL — SIGNIFICANT CHANGE UP (ref 80–100)
NRBC # BLD: 0 /100 WBCS — SIGNIFICANT CHANGE UP (ref 0–0)
NRBC # BLD: 0 /100 WBCS — SIGNIFICANT CHANGE UP (ref 0–0)
PHOSPHATE SERPL-MCNC: 4 MG/DL — SIGNIFICANT CHANGE UP (ref 2.5–4.5)
PHOSPHATE SERPL-MCNC: 4 MG/DL — SIGNIFICANT CHANGE UP (ref 2.5–4.5)
PLATELET # BLD AUTO: 126 K/UL — LOW (ref 150–400)
PLATELET # BLD AUTO: 126 K/UL — LOW (ref 150–400)
POTASSIUM SERPL-MCNC: 4.1 MMOL/L — SIGNIFICANT CHANGE UP (ref 3.5–5.3)
POTASSIUM SERPL-MCNC: 4.1 MMOL/L — SIGNIFICANT CHANGE UP (ref 3.5–5.3)
POTASSIUM SERPL-SCNC: 4.1 MMOL/L — SIGNIFICANT CHANGE UP (ref 3.5–5.3)
POTASSIUM SERPL-SCNC: 4.1 MMOL/L — SIGNIFICANT CHANGE UP (ref 3.5–5.3)
RBC # BLD: 3.09 M/UL — LOW (ref 3.8–5.2)
RBC # BLD: 3.09 M/UL — LOW (ref 3.8–5.2)
RBC # FLD: 18.3 % — HIGH (ref 10.3–14.5)
RBC # FLD: 18.3 % — HIGH (ref 10.3–14.5)
SODIUM SERPL-SCNC: 136 MMOL/L — SIGNIFICANT CHANGE UP (ref 135–145)
SODIUM SERPL-SCNC: 136 MMOL/L — SIGNIFICANT CHANGE UP (ref 135–145)
WBC # BLD: 8.89 K/UL — SIGNIFICANT CHANGE UP (ref 3.8–10.5)
WBC # BLD: 8.89 K/UL — SIGNIFICANT CHANGE UP (ref 3.8–10.5)
WBC # FLD AUTO: 8.89 K/UL — SIGNIFICANT CHANGE UP (ref 3.8–10.5)
WBC # FLD AUTO: 8.89 K/UL — SIGNIFICANT CHANGE UP (ref 3.8–10.5)

## 2023-11-21 PROCEDURE — 99232 SBSQ HOSP IP/OBS MODERATE 35: CPT

## 2023-11-21 PROCEDURE — 99233 SBSQ HOSP IP/OBS HIGH 50: CPT

## 2023-11-21 PROCEDURE — 74018 RADEX ABDOMEN 1 VIEW: CPT | Mod: 26

## 2023-11-21 RX ORDER — SENNA PLUS 8.6 MG/1
15 TABLET ORAL AT BEDTIME
Refills: 0 | Status: DISCONTINUED | OUTPATIENT
Start: 2023-11-21 | End: 2023-11-27

## 2023-11-21 RX ORDER — PANTOPRAZOLE SODIUM 20 MG/1
40 TABLET, DELAYED RELEASE ORAL
Refills: 0 | Status: DISCONTINUED | OUTPATIENT
Start: 2023-11-21 | End: 2023-12-04

## 2023-11-21 RX ORDER — ESCITALOPRAM OXALATE 10 MG/1
10 TABLET, FILM COATED ORAL
Refills: 0 | Status: DISCONTINUED | OUTPATIENT
Start: 2023-11-21 | End: 2024-01-17

## 2023-11-21 RX ORDER — AMLODIPINE BESYLATE 2.5 MG/1
10 TABLET ORAL DAILY
Refills: 0 | Status: DISCONTINUED | OUTPATIENT
Start: 2023-11-21 | End: 2024-01-17

## 2023-11-21 RX ORDER — MAGNESIUM SULFATE 500 MG/ML
2 VIAL (ML) INJECTION ONCE
Refills: 0 | Status: COMPLETED | OUTPATIENT
Start: 2023-11-21 | End: 2023-11-21

## 2023-11-21 RX ORDER — QUETIAPINE FUMARATE 200 MG/1
12.5 TABLET, FILM COATED ORAL
Refills: 0 | Status: DISCONTINUED | OUTPATIENT
Start: 2023-11-21 | End: 2024-01-17

## 2023-11-21 RX ORDER — INSULIN GLARGINE 100 [IU]/ML
25 INJECTION, SOLUTION SUBCUTANEOUS EVERY MORNING
Refills: 0 | Status: DISCONTINUED | OUTPATIENT
Start: 2023-11-22 | End: 2023-11-24

## 2023-11-21 RX ORDER — POLYETHYLENE GLYCOL 3350 17 G/17G
17 POWDER, FOR SOLUTION ORAL
Refills: 0 | Status: DISCONTINUED | OUTPATIENT
Start: 2023-11-21 | End: 2023-11-22

## 2023-11-21 RX ADMIN — Medication 8: at 12:21

## 2023-11-21 RX ADMIN — Medication 5 MILLIGRAM(S): at 12:19

## 2023-11-21 RX ADMIN — Medication 1 APPLICATION(S): at 17:37

## 2023-11-21 RX ADMIN — INSULIN GLARGINE 14 UNIT(S): 100 INJECTION, SOLUTION SUBCUTANEOUS at 06:18

## 2023-11-21 RX ADMIN — Medication 500 MILLIGRAM(S): at 17:41

## 2023-11-21 RX ADMIN — Medication 25 GRAM(S): at 12:19

## 2023-11-21 RX ADMIN — Medication 1 TABLET(S): at 17:36

## 2023-11-21 RX ADMIN — Medication 1 APPLICATION(S): at 05:41

## 2023-11-21 RX ADMIN — Medication 2 DROP(S): at 12:20

## 2023-11-21 RX ADMIN — Medication 3 MILLILITER(S): at 05:43

## 2023-11-21 RX ADMIN — Medication 200 MILLIGRAM(S): at 05:40

## 2023-11-21 RX ADMIN — GABAPENTIN 100 MILLIGRAM(S): 400 CAPSULE ORAL at 22:19

## 2023-11-21 RX ADMIN — Medication 500 MILLIGRAM(S): at 05:40

## 2023-11-21 RX ADMIN — Medication 2 DROP(S): at 17:37

## 2023-11-21 RX ADMIN — Medication 1 APPLICATION(S): at 12:20

## 2023-11-21 RX ADMIN — CHLORHEXIDINE GLUCONATE 15 MILLILITER(S): 213 SOLUTION TOPICAL at 05:44

## 2023-11-21 RX ADMIN — Medication 3 MILLILITER(S): at 23:58

## 2023-11-21 RX ADMIN — Medication 500 MILLIGRAM(S): at 12:19

## 2023-11-21 RX ADMIN — POLYETHYLENE GLYCOL 3350 17 GRAM(S): 17 POWDER, FOR SOLUTION ORAL at 09:13

## 2023-11-21 RX ADMIN — ZINC OXIDE 1 APPLICATION(S): 200 OINTMENT TOPICAL at 12:20

## 2023-11-21 RX ADMIN — SIMETHICONE 80 MILLIGRAM(S): 80 TABLET, CHEWABLE ORAL at 05:41

## 2023-11-21 RX ADMIN — Medication 2 DROP(S): at 22:31

## 2023-11-21 RX ADMIN — Medication 200 MILLIGRAM(S): at 17:35

## 2023-11-21 RX ADMIN — NYSTATIN CREAM 1 APPLICATION(S): 100000 CREAM TOPICAL at 14:25

## 2023-11-21 RX ADMIN — LIDOCAINE 1 PATCH: 4 CREAM TOPICAL at 01:00

## 2023-11-21 RX ADMIN — Medication 1 APPLICATION(S): at 22:31

## 2023-11-21 RX ADMIN — Medication 3 MILLILITER(S): at 17:12

## 2023-11-21 RX ADMIN — Medication 4: at 17:53

## 2023-11-21 RX ADMIN — ESCITALOPRAM OXALATE 10 MILLIGRAM(S): 10 TABLET, FILM COATED ORAL at 12:20

## 2023-11-21 RX ADMIN — Medication 5 MILLIGRAM(S): at 05:41

## 2023-11-21 RX ADMIN — Medication 4: at 00:04

## 2023-11-21 RX ADMIN — SODIUM CHLORIDE 4 MILLILITER(S): 9 INJECTION INTRAMUSCULAR; INTRAVENOUS; SUBCUTANEOUS at 17:37

## 2023-11-21 RX ADMIN — NYSTATIN CREAM 1 APPLICATION(S): 100000 CREAM TOPICAL at 05:42

## 2023-11-21 RX ADMIN — Medication 1 TABLET(S): at 05:41

## 2023-11-21 RX ADMIN — SENNA PLUS 15 MILLILITER(S): 8.6 TABLET ORAL at 22:19

## 2023-11-21 RX ADMIN — Medication 1 APPLICATION(S): at 05:42

## 2023-11-21 RX ADMIN — SODIUM CHLORIDE 4 MILLILITER(S): 9 INJECTION INTRAMUSCULAR; INTRAVENOUS; SUBCUTANEOUS at 05:43

## 2023-11-21 RX ADMIN — SIMETHICONE 80 MILLIGRAM(S): 80 TABLET, CHEWABLE ORAL at 12:20

## 2023-11-21 RX ADMIN — NYSTATIN CREAM 1 APPLICATION(S): 100000 CREAM TOPICAL at 22:20

## 2023-11-21 RX ADMIN — ZINC SULFATE TAB 220 MG (50 MG ZINC EQUIVALENT) 220 MILLIGRAM(S): 220 (50 ZN) TAB at 12:19

## 2023-11-21 RX ADMIN — ENOXAPARIN SODIUM 40 MILLIGRAM(S): 100 INJECTION SUBCUTANEOUS at 05:40

## 2023-11-21 RX ADMIN — Medication 3 MILLIGRAM(S): at 22:20

## 2023-11-21 RX ADMIN — Medication 3 MILLILITER(S): at 11:10

## 2023-11-21 RX ADMIN — SIMETHICONE 80 MILLIGRAM(S): 80 TABLET, CHEWABLE ORAL at 22:28

## 2023-11-21 RX ADMIN — Medication 1 APPLICATION(S): at 05:43

## 2023-11-21 RX ADMIN — Medication 15 MILLILITER(S): at 12:19

## 2023-11-21 RX ADMIN — Medication 500 MILLIGRAM(S): at 22:29

## 2023-11-21 RX ADMIN — Medication 125 MICROGRAM(S): at 05:41

## 2023-11-21 RX ADMIN — Medication 2 DROP(S): at 05:44

## 2023-11-21 RX ADMIN — QUETIAPINE FUMARATE 12.5 MILLIGRAM(S): 200 TABLET, FILM COATED ORAL at 17:41

## 2023-11-21 RX ADMIN — Medication 4: at 23:36

## 2023-11-21 RX ADMIN — POLYETHYLENE GLYCOL 3350 17 GRAM(S): 17 POWDER, FOR SOLUTION ORAL at 17:54

## 2023-11-21 RX ADMIN — PANTOPRAZOLE SODIUM 40 MILLIGRAM(S): 20 TABLET, DELAYED RELEASE ORAL at 12:19

## 2023-11-21 RX ADMIN — SIMETHICONE 80 MILLIGRAM(S): 80 TABLET, CHEWABLE ORAL at 17:41

## 2023-11-21 RX ADMIN — CHLORHEXIDINE GLUCONATE 1 APPLICATION(S): 213 SOLUTION TOPICAL at 05:43

## 2023-11-21 RX ADMIN — CHLORHEXIDINE GLUCONATE 15 MILLILITER(S): 213 SOLUTION TOPICAL at 17:36

## 2023-11-21 RX ADMIN — Medication 1 APPLICATION(S): at 17:36

## 2023-11-21 NOTE — PROGRESS NOTE ADULT - ASSESSMENT
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 Bleeding from gastrostomy.   reassessed site, no bleeding noted     2. Leakage at PEG site.   mild exudative leak   can try maalox soaked gauze pads bid-tid     3. Abdominal pain. CT negative for acute pathology.  miralax BID    4. Respiratory   per [facility]

## 2023-11-21 NOTE — PROGRESS NOTE ADULT - PROBLEM SELECTOR PLAN 6
-Continue home Prednisone regimen  *fibromyalgia  -continue home Gabapentin 100mg daily  -continue home Fentanyl 25mcg Q72H patches  - c/w Lidocaine patch   - Oxycodone 2.5mg q6 hrs PRN (in addition to Tylenol PRN) -Continue home Prednisone regimen 5 mg daily   *fibromyalgia  -continue home Gabapentin 100mg daily  -continue home Fentanyl 25mcg Q72H patches  - c/w Lidocaine patch   - Oxycodone 2.5mg q6 hrs PRN (in addition to Tylenol PRN)

## 2023-11-21 NOTE — PROGRESS NOTE ADULT - SUBJECTIVE AND OBJECTIVE BOX
INTERVAL HPI/OVERNIGHT EVENTS:    assessed PEG w/ RN .... no bleeding at site noted, some exudate noted    MEDICATIONS  (STANDING):  albuterol/ipratropium for Nebulization 3 milliLiter(s) Nebulizer every 6 hours  artificial  tears Solution 2 Drop(s) Both EYES four times a day  ascorbic acid 500 milliGRAM(s) Oral daily  bacitracin   Ointment 1 Application(s) Topical two times a day  bisacodyl Suppository 10 milliGRAM(s) Rectal once  calcium carbonate    500 mG (Tums) Chewable 1 Tablet(s) Chew every 12 hours  cephalexin 500 milliGRAM(s) Oral every 6 hours  chlorhexidine 0.12% Liquid 15 milliLiter(s) Oral Mucosa every 12 hours  chlorhexidine 4% Liquid 1 Application(s) Topical <User Schedule>  ciprofloxacin   IVPB 400 milliGRAM(s) IV Intermittent every 12 hours  dextrose 50% Injectable 50 milliLiter(s) IV Push once  enoxaparin Injectable 40 milliGRAM(s) SubCutaneous every 24 hours  escitalopram 10 milliGRAM(s) Oral daily  fentaNYL   Patch  25 MICROgram(s)/Hr 1 Patch Transdermal every 72 hours  gabapentin Solution 100 milliGRAM(s) Enteral Tube at bedtime  insulin glargine Injectable (LANTUS) 14 Unit(s) SubCutaneous every morning  insulin lispro (ADMELOG) corrective regimen sliding scale   SubCutaneous every 6 hours  levothyroxine 125 MICROGram(s) Oral <User Schedule>  lidocaine   4% Patch 1 Patch Transdermal daily  melatonin 3 milliGRAM(s) Oral at bedtime  multivitamin/minerals/iron Oral Solution (CENTRUM) 15 milliLiter(s) Oral daily  nystatin Powder 1 Application(s) Topical every 8 hours  oxybutynin 5 milliGRAM(s) Oral daily  pantoprazole   Suspension 40 milliGRAM(s) Enteral Tube daily  petrolatum Ophthalmic Ointment 1 Application(s) Both EYES four times a day  polyethylene glycol 3350 17 Gram(s) Oral <User Schedule>  predniSONE   Tablet 5 milliGRAM(s) Oral daily  QUEtiapine 12.5 milliGRAM(s) Oral <User Schedule>  QUEtiapine 12.5 milliGRAM(s) Oral <User Schedule>  senna Syrup 10 milliLiter(s) Oral at bedtime  simethicone 80 milliGRAM(s) Chew four times a day  sodium chloride 3%  Inhalation 4 milliLiter(s) Inhalation every 12 hours  triamcinolone 0.1% Ointment 1 Application(s) Topical two times a day  zinc oxide 40% Paste 1 Application(s) Topical daily  zinc sulfate 220 milliGRAM(s) Oral daily    MEDICATIONS  (PRN):  acetaminophen   Oral Liquid .. 1000 milliGRAM(s) Enteral Tube every 6 hours PRN Temp greater or equal to 38C (100.4F), Mild Pain (1 - 3)  benzocaine 20% Spray 1 Spray(s) Topical four times a day PRN Cough  diphenhydrAMINE Elixir 25 milliGRAM(s) Oral every 6 hours PRN Rash and/or Itching  oxyCODONE    Solution 2.5 milliGRAM(s) Oral every 6 hours PRN Moderate Pain (4 - 6)  sodium chloride 0.65% Nasal 1 Spray(s) Both Nostrils every 6 hours PRN Nasal Congestion      Allergies    penicillin (Unknown)  heparin (Unknown)  Tagamet (Unknown)  pineapple (Unknown)  walnut (Unknown)  Pecan, Filbert, Hazelnut (Unknown)  Lyrica (Unknown)    Intolerances        Review of Systems:    General:  No wt loss, fevers, chills, night sweats, fatigue   Eyes:  Good vision, no reported pain  ENT:  No sore throat, pain, runny nose, dysphagia  CV:  No pain, palpitations, hypo/hypertension  Resp:  No dyspnea, cough, tachypnea, wheezing  GI:  No pain, No nausea, No vomiting, No diarrhea, No constipation, No weight loss, No fever, No pruritis, No rectal bleeding, No melena, No dysphagia  :  No pain, bleeding, incontinence, nocturia  Muscle:  No pain, weakness  Neuro:  No weakness, tingling, memory problems  Psych:  No fatigue, insomnia, mood problems, depression  Endocrine:  No polyuria, polydypsia, cold/heat intolerance  Heme:  No petechiae, ecchymosis, easy bruisability  Skin:  No rash, tattoos, scars, edema      Vital Signs Last 24 Hrs  T(C): 37.6 (21 Nov 2023 11:44), Max: 37.6 (21 Nov 2023 11:44)  T(F): 99.6 (21 Nov 2023 11:44), Max: 99.6 (21 Nov 2023 11:44)  HR: 101 (21 Nov 2023 11:44) (78 - 112)  BP: 153/74 (21 Nov 2023 11:44) (146/65 - 158/80)  BP(mean): --  RR: 16 (21 Nov 2023 11:44) (12 - 18)  SpO2: 100% (21 Nov 2023 11:44) (97% - 100%)    Parameters below as of 21 Nov 2023 11:44  Patient On (Oxygen Delivery Method): ventilator        PHYSICAL EXAM:    Constitutional: NAD  HEENT: EOMI, throat clear  Neck: No LAD, supple +trach  Respiratory: CTA and P  Cardiovascular: S1 and S2, RRR, no M  Gastrointestinal: BS+, soft, NT/ND, neg HSM, +peg  Extremities: No peripheral edema, neg clubbing, cyanosis  Vascular: 2+ peripheral pulses  Neurological: A/O x 3, no focal deficits  Psychiatric: Normal mood, normal affect  Skin: No rashes      LABS:                        8.6    8.89  )-----------( 126      ( 21 Nov 2023 07:05 )             29.1     11-21    136  |  97  |  14  ----------------------------<  140<H>  4.1   |  27  |  <0.30<L>    Ca    9.5      21 Nov 2023 07:05  Phos  4.0     11-21  Mg     1.7     11-21        Urinalysis Basic - ( 21 Nov 2023 07:05 )    Color: x / Appearance: x / SG: x / pH: x  Gluc: 140 mg/dL / Ketone: x  / Bili: x / Urobili: x   Blood: x / Protein: x / Nitrite: x   Leuk Esterase: x / RBC: x / WBC x   Sq Epi: x / Non Sq Epi: x / Bacteria: x        RADIOLOGY & ADDITIONAL TESTS:

## 2023-11-21 NOTE — PROGRESS NOTE ADULT - SUBJECTIVE AND OBJECTIVE BOX
Significant recent/past 24 hr events:    Subjective:    Review of Systems         [ ] A ten-point review of systems was otherwise negative except as noted.  [ ] Due to altered mental status/intubation, subjective information were not able to be obtained from the patient. History was obtained, to the extent possible, from review of the chart and collateral sources of information.      Patient is a 73y old  Female who presents with a chief complaint of peg tube (2023 06:51)    HPI:  72 y/o F with PMHx of T2DM, HTN, HLD, anemia, hypothyroidism, RA, fibromyalgia, remote cerebral aneurysm repair, acute hypercapnic respiratory failure now with tracheostomy, PEG tube, and bedbound (trach change , PEG change ), sacral pressure ulcer, BIBEMS from home for 6 day hx of bleeding from the tracheostomy and PEG tube with associated abdominal pain. Patient still having regular bowel movements, last one occurred prior to ED arrival. Was seen outpatient on 10/26 by critical care Dr. Zaki Gottlieb and apparently had cultures sent at that time. Patient also noted to have chronic sacral decubitous ulcer. Family endorsed one episode of fever, though was not febrile on evaluation. Daughter noted patient was recently experiencing auditory and visual hallucinations (seeing animals that were not there & hearing a "bomb" going off outside her home), though patient not actively hallucinating on evaluation.    ED course notable for CT neck imaging showing no evidence of active bleeding around the tracheostomy site, no hematomas, and no drainable collection around the tracheostomy site. CT abdomen/pelvis notable for gastrostomy tube localized to the stomach with mild thickening noted along the tract; a sacral decubitus ulcer extending to the coccyx with osseous remodeling, possibly representing osteomyelitis; and bibasilar patchy opacities with volume loss in the lungs, representing atelectasis vs infection. Patient admitted for respiratory support, wound care of tracheostomy and PEG, and management of presumed sacral osteomyelitis.   (28 Oct 2023 04:18)    PAST MEDICAL & SURGICAL HISTORY:  Diabetes      Rheumatoid arthritis      Fibromyalgia      Hypothyroid      Hypertension      H/O tracheostomy      PEG (percutaneous endoscopic gastrostomy) status        FAMILY HISTORY:      Vitals   ICU Vital Signs Last 24 Hrs  T(C): 37 (2023 04:43), Max: 37.3 (2023 18:00)  T(F): 98.6 (2023 04:43), Max: 99.1 (2023 18:00)  HR: 88 (2023 05:54) (78 - 112)  BP: 158/80 (2023 04:43) (146/65 - 158/80)  BP(mean): --  ABP: --  ABP(mean): --  RR: 12 (2023 04:43) (12 - 18)  SpO2: 100% (2023 05:54) (97% - 100%)    O2 Parameters below as of 2023 05:54  Patient On (Oxygen Delivery Method): ventilator            Physical Exam:   Constitutional: NAD, well-groomed, well-developed  HEENT: PERRLA, EOMI, no drainage or redness  Neck: supple,  No JVD, Trachea midline  Back: Normal spine flexure, No CVA tenderness, No deformity or limitation of movement  Respiratory: Breath Sounds equal & clear bilaterally to auscultation, no accessory muscle use noted  Cardiovascular: Regular rate, regular rhythm, normal S1, S2; no murmurs or rub  Gastrointestinal: Soft, non-tender, non distended, no hepatosplenomegaly, normal bowel sounds  Extremities: GAMA x 4, no peripheral edema, no cyanosis, no clubbing   Vascular: Equal and normal pulses: 2+ peripheral pulses throughout  Neurological: A+O x 3; speech clear and intact; no sensory, motor  deficits, normal reflexes  Psychiatric: calm, normal mood, normal affect  Musculoskeletal: No joint swelling or deformity; no limitation of movement  Skin: warm, dry, well perfused, no rashes    VENT SETTINGS   Mode: AC/ CMV (Assist Control/ Continuous Mandatory Ventilation)  RR (machine): 12  TV (machine): 300  FiO2: 30  PEEP: 5  ITime: 1  MAP: 10  PIP: 36    ABG - ( 2023 12:50 )  pH, Arterial: 7.41  pH, Blood: x     /  pCO2: 46    /  pO2: 121   / HCO3: 29    / Base Excess: 3.8   /  SaO2: 98.8                I&O's Detail    2023 07:01  -  2023 07:00  --------------------------------------------------------  IN:    Enteral Tube Flush: 120 mL    Free Water: 120 mL    Miscellaneous Tube Feedin mL  Total IN: 540 mL    OUT:    Voided (mL): 900 mL  Total OUT: 900 mL    Total NET: -360 mL          LABS                        8.6    8.89  )-----------( 126      ( 2023 07:05 )             29.1         136  |  97  |  14  ----------------------------<  140<H>  4.1   |  27  |  <0.30<L>    Ca    9.5      2023 07:05  Phos  4.0       Mg     1.7                     Urinalysis Basic - ( 2023 07:05 )    Color: x / Appearance: x / SG: x / pH: x  Gluc: 140 mg/dL / Ketone: x  / Bili: x / Urobili: x   Blood: x / Protein: x / Nitrite: x   Leuk Esterase: x / RBC: x / WBC x   Sq Epi: x / Non Sq Epi: x / Bacteria: x      POCT Blood Glucose.: 145 mg/dL *H* (23 @ 06:12)  POCT Blood Glucose.: 219 mg/dL *H* (23 @ 23:59)  POCT Blood Glucose.: 240 mg/dL *H* (23 @ 18:09)  POCT Blood Glucose.: 273 mg/dL *H* (23 @ 13:01)        MEDICATIONS  (STANDING):  albuterol/ipratropium for Nebulization 3 milliLiter(s) Nebulizer every 6 hours  artificial  tears Solution 2 Drop(s) Both EYES four times a day  ascorbic acid 500 milliGRAM(s) Oral daily  bacitracin   Ointment 1 Application(s) Topical two times a day  bisacodyl Suppository 10 milliGRAM(s) Rectal once  calcium carbonate    500 mG (Tums) Chewable 1 Tablet(s) Chew every 12 hours  cephalexin 500 milliGRAM(s) Oral every 6 hours  chlorhexidine 0.12% Liquid 15 milliLiter(s) Oral Mucosa every 12 hours  chlorhexidine 4% Liquid 1 Application(s) Topical <User Schedule>  ciprofloxacin   IVPB 400 milliGRAM(s) IV Intermittent every 12 hours  dextrose 50% Injectable 50 milliLiter(s) IV Push once  enoxaparin Injectable 40 milliGRAM(s) SubCutaneous every 24 hours  escitalopram 10 milliGRAM(s) Oral daily  fentaNYL   Patch  25 MICROgram(s)/Hr 1 Patch Transdermal every 72 hours  gabapentin Solution 100 milliGRAM(s) Enteral Tube at bedtime  insulin glargine Injectable (LANTUS) 14 Unit(s) SubCutaneous every morning  insulin lispro (ADMELOG) corrective regimen sliding scale   SubCutaneous every 6 hours  levothyroxine 125 MICROGram(s) Oral <User Schedule>  lidocaine   4% Patch 1 Patch Transdermal daily  melatonin 3 milliGRAM(s) Oral at bedtime  multivitamin/minerals/iron Oral Solution (CENTRUM) 15 milliLiter(s) Oral daily  nystatin Powder 1 Application(s) Topical every 8 hours  oxybutynin 5 milliGRAM(s) Oral daily  pantoprazole   Suspension 40 milliGRAM(s) Enteral Tube daily  petrolatum Ophthalmic Ointment 1 Application(s) Both EYES four times a day  polyethylene glycol 3350 17 Gram(s) Oral <User Schedule>  predniSONE   Tablet 5 milliGRAM(s) Oral daily  QUEtiapine 12.5 milliGRAM(s) Oral <User Schedule>  QUEtiapine 12.5 milliGRAM(s) Oral <User Schedule>  senna Syrup 10 milliLiter(s) Oral at bedtime  simethicone 80 milliGRAM(s) Chew four times a day  sodium chloride 3%  Inhalation 4 milliLiter(s) Inhalation every 12 hours  triamcinolone 0.1% Ointment 1 Application(s) Topical two times a day  zinc oxide 40% Paste 1 Application(s) Topical daily  zinc sulfate 220 milliGRAM(s) Oral daily    MEDICATIONS  (PRN):  acetaminophen   Oral Liquid .. 1000 milliGRAM(s) Enteral Tube every 6 hours PRN Temp greater or equal to 38C (100.4F), Mild Pain (1 - 3)  benzocaine 20% Spray 1 Spray(s) Topical four times a day PRN Cough  diphenhydrAMINE Elixir 25 milliGRAM(s) Oral every 6 hours PRN Rash and/or Itching  oxyCODONE    Solution 2.5 milliGRAM(s) Oral every 6 hours PRN Moderate Pain (4 - 6)  sodium chloride 0.65% Nasal 1 Spray(s) Both Nostrils every 6 hours PRN Nasal Congestion      Allergies:  penicillin (Unknown)  heparin (Unknown)  Tagamet (Unknown)  pineapple (Unknown)  walnut (Unknown)  PecanAndressa, Hazelnut (Unknown)  Lyrica (Unknown)        CRITICAL CARE TIME SPENT:  minutes of critical care time spent providing medical care for patient's acute illness/conditions that impairs at least one vital organ system and/or poses a high risk of imminent or life threatening deterioration in the patient's condition. It includes time spent evaluating and treating the patient's acute illness as well as time spent reviewing labs, radiology, discussing goals of care with patient and/or patient's family, and discussing the case with a multidisciplinary team, in an effort to prevent further life threatening deterioration or end organ damage. This time is independent of any procedures performed.         Significant recent/past 24 hr events: no significant events     Subjective: pt c/o recurrent RLQ abdominal pain and stated " I am not sure what is wrong".     Review of Systems         [ ] A ten-point review of systems was otherwise negative except as noted.  [ ] Due to altered mental status/intubation, subjective information were not able to be obtained from the patient. History was obtained, to the extent possible, from review of the chart and collateral sources of information.      Patient is a 73y old  Female who presents with a chief complaint of peg tube (2023 06:51)    HPI:  74 y/o F with PMHx of T2DM, HTN, HLD, anemia, hypothyroidism, RA, fibromyalgia, remote cerebral aneurysm repair, acute hypercapnic respiratory failure now with tracheostomy, PEG tube, and bedbound (trach change , PEG change ), sacral pressure ulcer, BIBEMS from home for 6 day hx of bleeding from the tracheostomy and PEG tube with associated abdominal pain. Patient still having regular bowel movements, last one occurred prior to ED arrival. Was seen outpatient on 10/26 by critical care Dr. Zaki Gottlieb and apparently had cultures sent at that time. Patient also noted to have chronic sacral decubitous ulcer. Family endorsed one episode of fever, though was not febrile on evaluation. Daughter noted patient was recently experiencing auditory and visual hallucinations (seeing animals that were not there & hearing a "bomb" going off outside her home), though patient not actively hallucinating on evaluation.    ED course notable for CT neck imaging showing no evidence of active bleeding around the tracheostomy site, no hematomas, and no drainable collection around the tracheostomy site. CT abdomen/pelvis notable for gastrostomy tube localized to the stomach with mild thickening noted along the tract; a sacral decubitus ulcer extending to the coccyx with osseous remodeling, possibly representing osteomyelitis; and bibasilar patchy opacities with volume loss in the lungs, representing atelectasis vs infection. Patient admitted for respiratory support, wound care of tracheostomy and PEG, and management of presumed sacral osteomyelitis.   (28 Oct 2023 04:18)    PAST MEDICAL & SURGICAL HISTORY:  Diabetes      Rheumatoid arthritis      Fibromyalgia      Hypothyroid      Hypertension      H/O tracheostomy      PEG (percutaneous endoscopic gastrostomy) status        FAMILY HISTORY:      Vitals   ICU Vital Signs Last 24 Hrs  T(C): 37 (2023 04:43), Max: 37.3 (2023 18:00)  T(F): 98.6 (2023 04:43), Max: 99.1 (2023 18:00)  HR: 88 (2023 05:54) (78 - 112)  BP: 158/80 (2023 04:43) (146/65 - 158/80)  BP(mean): --  ABP: --  ABP(mean): --  RR: 12 (2023 04:43) (12 - 18)  SpO2: 100% (2023 05:54) (97% - 100%)    O2 Parameters below as of 2023 05:54  Patient On (Oxygen Delivery Method): ventilator            Physical Exam:   Constitutional: NAD, well-groomed, well-developed  HEENT: PERRLA, EOMI, no drainage or redness  Neck: supple,  No JVD, Trachea midline  Back: Normal spine flexure, No CVA tenderness, No deformity or limitation of movement  Respiratory: Breath Sounds equal & clear bilaterally to auscultation, no accessory muscle use noted  Cardiovascular: Regular rate, regular rhythm, normal S1, S2; no murmurs or rub  Gastrointestinal: Soft, non-tender, non distended, no hepatosplenomegaly, normal bowel sounds  Extremities: GAMA x 4, no peripheral edema, no cyanosis, no clubbing   Vascular: Equal and normal pulses: 2+ peripheral pulses throughout  Neurological: alert and awake, following commands, no sensory, motor  deficits, normal reflexes  Psychiatric: calm, normal mood, normal affect  Musculoskeletal: No joint swelling or deformity; no limitation of movement  Skin: warm, dry, well perfused, no rashes    VENT SETTINGS   Mode: AC/ CMV (Assist Control/ Continuous Mandatory Ventilation)  RR (machine): 12  TV (machine): 300  FiO2: 30  PEEP: 5  ITime: 1  MAP: 10  PIP: 36    ABG - ( 2023 12:50 )  pH, Arterial: 7.41  pH, Blood: x     /  pCO2: 46    /  pO2: 121   / HCO3: 29    / Base Excess: 3.8   /  SaO2: 98.8                I&O's Detail    2023 07:01  -  2023 07:00  --------------------------------------------------------  IN:    Enteral Tube Flush: 120 mL    Free Water: 120 mL    Miscellaneous Tube Feedin mL  Total IN: 540 mL    OUT:    Voided (mL): 900 mL  Total OUT: 900 mL    Total NET: -360 mL          LABS                        8.6    8.89  )-----------( 126      ( 2023 07:05 )             29.1         136  |  97  |  14  ----------------------------<  140<H>  4.1   |  27  |  <0.30<L>    Ca    9.5      2023 07:05  Phos  4.0       Mg     1.7                     Urinalysis Basic - ( 2023 07:05 )    Color: x / Appearance: x / SG: x / pH: x  Gluc: 140 mg/dL / Ketone: x  / Bili: x / Urobili: x   Blood: x / Protein: x / Nitrite: x   Leuk Esterase: x / RBC: x / WBC x   Sq Epi: x / Non Sq Epi: x / Bacteria: x      POCT Blood Glucose.: 145 mg/dL *H* (23 @ 06:12)  POCT Blood Glucose.: 219 mg/dL *H* (23 @ 23:59)  POCT Blood Glucose.: 240 mg/dL *H* (23 @ 18:09)  POCT Blood Glucose.: 273 mg/dL *H* (23 @ 13:01)        MEDICATIONS  (STANDING):  albuterol/ipratropium for Nebulization 3 milliLiter(s) Nebulizer every 6 hours  artificial  tears Solution 2 Drop(s) Both EYES four times a day  ascorbic acid 500 milliGRAM(s) Oral daily  bacitracin   Ointment 1 Application(s) Topical two times a day  bisacodyl Suppository 10 milliGRAM(s) Rectal once  calcium carbonate    500 mG (Tums) Chewable 1 Tablet(s) Chew every 12 hours  cephalexin 500 milliGRAM(s) Oral every 6 hours  chlorhexidine 0.12% Liquid 15 milliLiter(s) Oral Mucosa every 12 hours  chlorhexidine 4% Liquid 1 Application(s) Topical <User Schedule>  ciprofloxacin   IVPB 400 milliGRAM(s) IV Intermittent every 12 hours  dextrose 50% Injectable 50 milliLiter(s) IV Push once  enoxaparin Injectable 40 milliGRAM(s) SubCutaneous every 24 hours  escitalopram 10 milliGRAM(s) Oral daily  fentaNYL   Patch  25 MICROgram(s)/Hr 1 Patch Transdermal every 72 hours  gabapentin Solution 100 milliGRAM(s) Enteral Tube at bedtime  insulin glargine Injectable (LANTUS) 14 Unit(s) SubCutaneous every morning  insulin lispro (ADMELOG) corrective regimen sliding scale   SubCutaneous every 6 hours  levothyroxine 125 MICROGram(s) Oral <User Schedule>  lidocaine   4% Patch 1 Patch Transdermal daily  melatonin 3 milliGRAM(s) Oral at bedtime  multivitamin/minerals/iron Oral Solution (CENTRUM) 15 milliLiter(s) Oral daily  nystatin Powder 1 Application(s) Topical every 8 hours  oxybutynin 5 milliGRAM(s) Oral daily  pantoprazole   Suspension 40 milliGRAM(s) Enteral Tube daily  petrolatum Ophthalmic Ointment 1 Application(s) Both EYES four times a day  polyethylene glycol 3350 17 Gram(s) Oral <User Schedule>  predniSONE   Tablet 5 milliGRAM(s) Oral daily  QUEtiapine 12.5 milliGRAM(s) Oral <User Schedule>  QUEtiapine 12.5 milliGRAM(s) Oral <User Schedule>  senna Syrup 10 milliLiter(s) Oral at bedtime  simethicone 80 milliGRAM(s) Chew four times a day  sodium chloride 3%  Inhalation 4 milliLiter(s) Inhalation every 12 hours  triamcinolone 0.1% Ointment 1 Application(s) Topical two times a day  zinc oxide 40% Paste 1 Application(s) Topical daily  zinc sulfate 220 milliGRAM(s) Oral daily    MEDICATIONS  (PRN):  acetaminophen   Oral Liquid .. 1000 milliGRAM(s) Enteral Tube every 6 hours PRN Temp greater or equal to 38C (100.4F), Mild Pain (1 - 3)  benzocaine 20% Spray 1 Spray(s) Topical four times a day PRN Cough  diphenhydrAMINE Elixir 25 milliGRAM(s) Oral every 6 hours PRN Rash and/or Itching  oxyCODONE    Solution 2.5 milliGRAM(s) Oral every 6 hours PRN Moderate Pain (4 - 6)  sodium chloride 0.65% Nasal 1 Spray(s) Both Nostrils every 6 hours PRN Nasal Congestion      Allergies:  penicillin (Unknown)  heparin (Unknown)  Tagamet (Unknown)  pineapple (Unknown)  walnut (Unknown)  Pecan, Filbert, Hazelnut (Unknown)  Lyrica (Unknown)

## 2023-11-21 NOTE — PROGRESS NOTE ADULT - PROBLEM SELECTOR PLAN 1
MRI confirms acute osteomyelitis of sacrum.  Sputum culture showing Pseudomonas and serratia. Antibiotics completed as prescribed

## 2023-11-21 NOTE — PROGRESS NOTE ADULT - NS ATTEND AMEND GEN_ALL_CORE FT
73F with a h/o DM, HTN, HLD, anemia, hypothyroidism, RA, fibromyalgia, remote cerebral aneurysm with repair, hypercapnic respiratory failure s/p tracheostomy/PEG, bedbound, sacral pressure ulcer. Admitted w/ bleeding from tracheostomy and PEG w/ associated abdominal pain, recent hx of auditory/visual hallucinations. Since admission, no further bleeding noted from either trach or PEG. Imaging showed mild thickening along PEG tube tract and sacral ulcer extending to coccyx suggestive of OM.        # Osteomyelitis  # Chronic hypoxemic RF  # oropharyngeal dysphagia  # acute on chronic pain  # Trach/Peg bleeding  # Tracheitis with Pseudomonas and serratia  # Hx of hallucination, anxiety    - mental status appropriate today, attempting to follow commands,  very weakly sampson  3+  - MRI showing sacral Osteo,  c/w wound care, no plans for surgery. Will need prolonged course of abx.   -  s/p  course of Cefepime on 11/15 for sputum culture with PsA and serratia,   - now on Cipro and Keflex until December, f/u ID reccs   - c/w Airway clearance -  Duonebs and 3% Hypersal, chest PT.   - PS trials during the day, vent at night, and will need vent on discharge given hypercapnic episodes previously   - family requested S/S eval but unable to given dependence on vent  - C/W pain control, multifactorial 2/2 to fibromyalgia as well as now osteo with pressure ulcer   - Peg irritation seen by GI again today, no bleeding and rec maalox gauze around site, H/H stable  - negative AXR today  - Thrombocytopenia - improved has been stable since admission at current level (prior outpt plt count was also at same level)  - Appreciate  follow up for delirium and hallucinations, patient calm and appropriate today - c/w Seroquel 12.5 mg at 3 pm and another 12.5 mg at 8 pm. Melatonin QHS for sleep. c/w Lexapro at current dose.  - Corneal erythema improved, no evidence of discharge, completed a course of Cipro eye drops, c/w artificial tears  - Appreciate derm consult for right back skin lesion- dermatofibroma, benign lesion. Mid back with irritated seborrheic keratosis, topical triamcinolone 0.1% bid cont, outpt f/u w/ Derm  - monitor for signs of trach site bleeding, none noted today, contact ENT if  bleeding noted   - does have only blood tinged secretions on suctioning due to suction trauma  - DVT ppx- lovenox  - Dispo- full code 73F with a h/o DM, HTN, HLD, anemia, hypothyroidism, RA, fibromyalgia, remote cerebral aneurysm with repair, hypercapnic respiratory failure s/p tracheostomy/PEG, bedbound, sacral pressure ulcer. Admitted w/ bleeding from tracheostomy and PEG w/ associated abdominal pain, recent hx of auditory/visual hallucinations. Since admission, no further bleeding noted from either trach or PEG. Imaging showed mild thickening along PEG tube tract and sacral ulcer extending to coccyx suggestive of OM.        # Osteomyelitis  # Chronic hypoxemic RF  # oropharyngeal dysphagia  # acute on chronic pain  # Trach/Peg bleeding  # Tracheitis with Pseudomonas and serratia  # Hx of hallucination, anxiety    - mental status appropriate today, attempting to follow commands,  very weakly sampson  3+  - MRI showing sacral Osteo,  c/w wound care, no plans for surgery. Will need prolonged course of abx.   -  s/p  course of Cefepime on 11/15 for sputum culture with PsA and serratia,   - now on Cipro and Keflex until December, f/u ID reccs   - f/u wound care reccs, improving wound, cont tx plan and outpt f/u  - c/w Airway clearance -  Duonebs and 3% Hypersal, chest PT.   - PS trials during the day, vent at night, and will need vent on discharge given hypercapnic episodes previously   - family requested S/S eval but unable to given dependence on vent  - C/W pain control, multifactorial 2/2 to fibromyalgia as well as now osteo with pressure ulcer   - Peg irritation seen by GI again today, no bleeding and rec maalox gauze around site, H/H stable  - negative AXR today  - Thrombocytopenia - improved has been stable since admission at current level (prior outpt plt count was also at same level)  - Appreciate  follow up for delirium and hallucinations, patient calm and appropriate today - c/w Seroquel 12.5 mg at 3 pm and another 12.5 mg at 8 pm. Melatonin QHS for sleep. c/w Lexapro at current dose.  - Corneal erythema improved, no evidence of discharge, completed a course of Cipro eye drops, c/w artificial tears  - Appreciate derm consult for right back skin lesion- dermatofibroma, benign lesion. Mid back with irritated seborrheic keratosis, topical triamcinolone 0.1% bid cont, outpt f/u w/ Derm  - monitor for signs of trach site bleeding, none noted today, contact ENT if  bleeding noted   - does have only blood tinged secretions on suctioning due to suction trauma  - DVT ppx- lovenox  - Dispo- full code

## 2023-11-21 NOTE — PROGRESS NOTE ADULT - PROBLEM SELECTOR PLAN 5
- Hold home amlodipine held on admission, may resume if BP remains stable   - Echo from 10/17/2020 notable for hyperdynamic L ventricular systolic function, moderately severe LVOT obstruction, and EF 81% (per Wilkinson calculation) vs 77% (per Teichholz calculation) - Home amlodipine held on admission,)__ restarted today -- continue monitoring BP   - Echo from 10/17/2020 notable for hyperdynamic L ventricular systolic function, moderately severe LVOT obstruction, and EF 81% (per Wilkinson calculation) vs 77% (per Teichholz calculation)

## 2023-11-21 NOTE — PROGRESS NOTE ADULT - PROBLEM SELECTOR PLAN 9
Lovenox for DVT ppx     [facility]: Full code Lovenox for DVT ppx -- held today in s/o persistent thrombocytopenia _ call hematology consultation in am,     [facility]: Full code  __ long discussion of MICU team with daughter, pt remains FULL CODE, daughter wishes MOLST form reevaluation once pt is less delirious

## 2023-11-21 NOTE — PROGRESS NOTE ADULT - PROBLEM SELECTOR PLAN 3
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  11/18 RR increased to 14 due to hypercarbia from trach collar trial  - Tolerates intermittent PSV 15/5 during day/ Vent HS  - Airway Clearance: Duoneb, Hypersal, Chest PT, PRN suctioning   - Maintain 02 sat > 92%  Tracheal Bleeding (Intermittent)-->resolved since admission   -S/p CT Angio neck: No evidence of active bleeding around tracheostomy site. No hematoma.  No collection   - Seen by ENT; Kath and b/l bronchi visualized without any trauma. small amount of blood tinged secretions resting at beginning of right bronchus not actively bleeding.  - 11/3 trach changed to #9 Portex by ENT  - 11/17:  Due to persistent air leak and volume loss on vent, trach again changed by ENT to #8 distal cuffed shiley.  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  11/18 RR increased to 14 due to hypercarbia from trach collar trial  - Tolerates intermittent PSV 15/5 during day/ Vent HS  - Airway Clearance: Duoneb, Hypersal, Chest PT, PRN suctioning   - Maintain 02 sat > 92%  Tracheal Bleeding (Intermittent)-->resolved since admission   -S/p CT Angio neck: No evidence of active bleeding around tracheostomy site. No hematoma.  No collection   - Seen by ENT; Kath and b/l bronchi visualized without any trauma. small amount of blood tinged secretions resting at beginning of right bronchus not actively bleeding.  - 11/3 trach changed to #9 Portex by ENT  - 11/17:  Due to persistent air leak and volume loss on vent, trach again changed by ENT to #8 distal cuffed Shiley.  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

## 2023-11-21 NOTE — PROGRESS NOTE ADULT - SUBJECTIVE AND OBJECTIVE BOX
Patient is a 73y old  Female who presents with a chief complaint of peg tube (16 Nov 2023 06:51)      SUBJECTIVE / OVERNIGHT EVENTS: no new events  She was tried on trach collar, but had to be placed back on [facility] vent due to hypercapnea.  C/o RLQ pain    MEDICATIONS  (STANDING):  albuterol/ipratropium for Nebulization 3 milliLiter(s) Nebulizer every 6 hours  artificial  tears Solution 2 Drop(s) Both EYES four times a day  ascorbic acid 500 milliGRAM(s) Oral daily  bacitracin   Ointment 1 Application(s) Topical two times a day  bisacodyl Suppository 10 milliGRAM(s) Rectal once  calcium carbonate    500 mG (Tums) Chewable 1 Tablet(s) Chew every 12 hours  cephalexin 500 milliGRAM(s) Oral every 6 hours  chlorhexidine 0.12% Liquid 15 milliLiter(s) Oral Mucosa every 12 hours  chlorhexidine 4% Liquid 1 Application(s) Topical <User Schedule>  ciprofloxacin     Tablet 500 milliGRAM(s) Oral every 12 hours  dextrose 50% Injectable 50 milliLiter(s) IV Push once  enoxaparin Injectable 40 milliGRAM(s) SubCutaneous every 24 hours  escitalopram 10 milliGRAM(s) Oral daily  fentaNYL   Patch  25 MICROgram(s)/Hr 1 Patch Transdermal every 72 hours  gabapentin Solution 100 milliGRAM(s) Enteral Tube at bedtime  insulin glargine Injectable (LANTUS) 14 Unit(s) SubCutaneous every morning  insulin lispro (ADMELOG) corrective regimen sliding scale   SubCutaneous every 6 hours  levothyroxine 125 MICROGram(s) Oral <User Schedule>  lidocaine   4% Patch 1 Patch Transdermal daily  melatonin 3 milliGRAM(s) Oral at bedtime  multivitamin/minerals/iron Oral Solution (CENTRUM) 15 milliLiter(s) Oral daily  nystatin Powder 1 Application(s) Topical every 8 hours  oxybutynin 5 milliGRAM(s) Oral daily  pantoprazole   Suspension 40 milliGRAM(s) Enteral Tube daily  petrolatum Ophthalmic Ointment 1 Application(s) Both EYES four times a day  predniSONE   Tablet 5 milliGRAM(s) Oral daily  QUEtiapine 12.5 milliGRAM(s) Oral <User Schedule>  QUEtiapine 12.5 milliGRAM(s) Oral <User Schedule>  senna Syrup 10 milliLiter(s) Oral at bedtime  simethicone 80 milliGRAM(s) Chew four times a day  sodium chloride 3%  Inhalation 4 milliLiter(s) Inhalation every 12 hours  triamcinolone 0.1% Ointment 1 Application(s) Topical two times a day  zinc oxide 40% Paste 1 Application(s) Topical daily  zinc sulfate 220 milliGRAM(s) Oral daily    MEDICATIONS  (PRN):  acetaminophen   Oral Liquid .. 1000 milliGRAM(s) Enteral Tube every 6 hours PRN Temp greater or equal to 38C (100.4F), Mild Pain (1 - 3)  benzocaine 20% Spray 1 Spray(s) Topical four times a day PRN Cough  diphenhydrAMINE Elixir 25 milliGRAM(s) Oral every 6 hours PRN Rash and/or Itching  oxyCODONE    Solution 2.5 milliGRAM(s) Oral every 6 hours PRN Moderate Pain (4 - 6)  sodium chloride 0.65% Nasal 1 Spray(s) Both Nostrils every 6 hours PRN Nasal Congestion      Vital Signs Last 24 Hrs  T(C): 37 (21 Nov 2023 04:43), Max: 37.3 (20 Nov 2023 18:00)  T(F): 98.6 (21 Nov 2023 04:43), Max: 99.1 (20 Nov 2023 18:00)  HR: 87 (21 Nov 2023 09:07) (78 - 112)  BP: 158/80 (21 Nov 2023 04:43) (146/65 - 158/80)  BP(mean): --  RR: 12 (21 Nov 2023 04:43) (12 - 18)  SpO2: 100% (21 Nov 2023 09:07) (97% - 100%)    Parameters below as of 21 Nov 2023 05:54  Patient On (Oxygen Delivery Method): ventilator    CAPILLARY BLOOD GLUCOSE      POCT Blood Glucose.: 145 mg/dL (21 Nov 2023 06:12)  POCT Blood Glucose.: 219 mg/dL (20 Nov 2023 23:59)  POCT Blood Glucose.: 240 mg/dL (20 Nov 2023 18:09)  POCT Blood Glucose.: 273 mg/dL (20 Nov 2023 13:01)      I&O's Summary    17 Nov 2023 07:01  -  18 Nov 2023 07:00  --------------------------------------------------------  IN: 1300 mL / OUT: 950 mL / NET: 350 mL    18 Nov 2023 07:01  -  18 Nov 2023 17:32  --------------------------------------------------------  IN: 0 mL / OUT: 400 mL / NET: -400 mL        PHYSICAL EXAM:  GENERAL: NAD, well-developed  HEAD:  Atraumatic, Normocephalic  EYES: EOMI, PERRLA, conjunctiva and sclera clear  NECK: Supple, No JVD. On [facility] vent with a tracheostomy  CHEST/LUNG: Clear to auscultation bilaterally; No wheeze  HEART: Regular rate and rhythm; No murmurs, rubs, or gallops  ABDOMEN: Soft, Nontender, Nondistended; Bowel sounds present  EXTREMITIES:  2+ Peripheral Pulses, No clubbing, cyanosis, or edema  PSYCH: AAOx3  NEUROLOGY: non-focal. Functionally quadriplegic  SKIN: No rashes or lesions    LABS:                        8.9    9.19  )-----------( 124      ( 18 Nov 2023 09:15 )             30.4     11-18    137  |  99  |  29<H>  ----------------------------<  224<H>  4.1   |  28  |  <0.30<L>    Ca    10.0      18 Nov 2023 09:15  Mg     1.8     11-18    TPro  7.4  /  Alb  3.3  /  TBili  0.3  /  DBili  x   /  AST  19  /  ALT  18  /  AlkPhos  48  11-18          Urinalysis Basic - ( 18 Nov 2023 09:15 )    Color: x / Appearance: x / SG: x / pH: x  Gluc: 224 mg/dL / Ketone: x  / Bili: x / Urobili: x   Blood: x / Protein: x / Nitrite: x   Leuk Esterase: x / RBC: x / WBC x   Sq Epi: x / Non Sq Epi: x / Bacteria: x        RADIOLOGY & ADDITIONAL TESTS:    Imaging Personally Reviewed:    Consultant(s) Notes Reviewed:      Care Discussed with Consultants/Other Providers:

## 2023-11-21 NOTE — PROGRESS NOTE ADULT - ASSESSMENT
72 y/o F with PMHx of T2DM, HTN, HLD, anemia, hypothyroidism, RA, fibromyalgia, remote cerebral aneurysm repair, acute hypercapnic respiratory failure now with tracheostomy, PEG tube, and bedbound (trach change 8/18, PEG change 8/14), sacral pressure ulcer, BIBEMS from home for 6 day hx of bleeding from the tracheostomy and PEG tube with associated abdominal pain, recent history of auditory and visual hallucinations, and with chronic sacral decubitus ulcer. Exam notable for mild abdominal distention with LLQ and RLQ tenderness, tracheostomy in place with no obvious bleeding, PEG tube with scant amount of dried blood, at baseline neurologic status. Imaging showing no active bleeding around tracheostomy site, however was notable for mild thickening along PEG tube tract, sacral ulcer extending to the coccyx suggestive of possible osteomyelitis, and bibasilar patchy lung opacities with volume loss. Patient admitted for respiratory support, wound care of tracheostomy and PEG, and management of sacral osteomyelitis. No further Trach and peg site  bleeding noted since admission.  Pelvic MRI imaging also suggestive of Acute OM. Patient placed on long-term antibiotics with Infectious disease guidance.  Additionally treated with a 7d course of Cefepime due to PSA and Serratia tracheitis on 11/8-->11/15 and then resumed cipro/keflex.  Hospital course c/b Delirium for which Seroquel was added and home Lexapro continued. Tracheostomy changed to #9 Cuffed Portex by ENT 11/3.  Despite trach change patient remained with persistent air leak.  On 11/19, the trach was again changed due to leak and loss of volumes on vent.  Trach was changed to #8 distal cuffed Shiley.  Patient seen by Dermatology for back lesions.  One diagnosed as a seborrheic keratitis and the other a dermatofibroma. 1    11/18:  Attempted trach collar which was tolerated for over 2.5 hours  however ABG revealed acute respiratory acidosis.  Thus patient placed back on vent.  Subsequent ABG with mild improvement thus RR increased from 12 to 16.     11/20: c/w Trial of CPAP w high setting, will wean as tolerated. Vent as needed . - c/w current abx. as per ID pharmacist, multiple drug interactions - recommend to change Cipro to IV            74 y/o F with PMHx of T2DM, HTN, HLD, anemia, hypothyroidism, RA, fibromyalgia, remote cerebral aneurysm repair, acute hypercapnic respiratory failure now with tracheostomy, PEG tube, and bedbound (trach change 8/18, PEG change 8/14), sacral pressure ulcer, BIBEMS from home for 6 day hx of bleeding from the tracheostomy and PEG tube with associated abdominal pain, recent history of auditory and visual hallucinations, and with chronic sacral decubitus ulcer. Exam notable for mild abdominal distention with LLQ and RLQ tenderness, tracheostomy in place with no obvious bleeding, PEG tube with scant amount of dried blood, at baseline neurologic status. Imaging showing no active bleeding around tracheostomy site, however was notable for mild thickening along PEG tube tract, sacral ulcer extending to the coccyx suggestive of possible osteomyelitis, and bibasilar patchy lung opacities with volume loss. Patient admitted for respiratory support, wound care of tracheostomy and PEG, and management of sacral osteomyelitis. No further Trach and peg site  bleeding noted since admission.  Pelvic MRI imaging also suggestive of Acute OM. Patient placed on long-term antibiotics with Infectious disease guidance.  Additionally treated with a 7d course of Cefepime due to PSA and Serratia tracheitis on 11/8-->11/15 and then resumed cipro/keflex.  Hospital course c/b Delirium for which Seroquel was added and home Lexapro continued. Tracheostomy changed to #9 Cuffed Portex by ENT 11/3.  Despite trach change patient remained with persistent air leak.  On 11/19, the trach was again changed due to leak and loss of volumes on vent.  Trach was changed to #8 distal cuffed Shiley.  Patient seen by Dermatology for back lesions.  One diagnosed as a seborrheic keratitis and the other a dermatofibroma. 1    11/18:  Attempted trach collar which was tolerated for over 2.5 hours  however ABG revealed acute respiratory acidosis.  Thus patient placed back on vent.  Subsequent ABG with mild improvement thus RR increased from 12 to 16.     11/20: c/w Trial of CPAP w high setting, will wean as tolerated. Vent as needed . - c/w current abx. as per ID pharmacist, multiple drug interactions - recommend to change Cipro to IV   11/21-- reevaluated by GI for bleeding from PEG and RLQ abdominal pain -- no acute intervention

## 2023-11-21 NOTE — PROGRESS NOTE ADULT - PROBLEM SELECTOR PLAN 4
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   - Bowel regimen  - PEG Site appears macerated. Multifactorial, possible the PEG has been too tight at home.  - Peg loosened by GI at beside, no leakage or further intervention required   - Continue Simethicone 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   - Bowel regimen,   - PEG Site appears macerated. Multifactorial, possible the PEG has been too tight at home.  - Peg loosened by GI at beside, no leakage or further intervention required   -- recurrent RLQ abdominal pain and bleeding at the PEG site>> __   -- reevaluated by GI __ -- no bleeding at the PEG site  - no BM since 11/19-- increased dose of Senna and increased Miralax to BID   -- - Continue Simethicone, cont monitoring

## 2023-11-21 NOTE — PROGRESS NOTE ADULT - PROBLEM SELECTOR PLAN 2
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   - S/p CT abd/pelvis (10/28): Sacral decubitus ulcer extending to the coccyx with osseous remodeling and pelvic MRI or bone scan to recc to exclude osteomyelitis.  - MRI pelvis 10/30 with Osteomyelitis, c/w current Cefepime for 7 days, Vancomycin d/marli   - Elevated, ESR 85   - Elevated,    - Wound care on board  -11/10:  resumed Cipro 500mg q 12 and Keflex 500mg q 6 until 12/10.  -11/20: Changed to IV Cipro 400 q12 as recommended by ID pharmacist due to multiple drug interactions when given via PEG

## 2023-11-21 NOTE — PROGRESS NOTE ADULT - SUBJECTIVE AND OBJECTIVE BOX
[facility]-- WOUND TEAM -- FOLLOW UP NOTE  --------------------------------------------------------------------------------    24 hour events/subjective:    afebrile  more alert  tolerating TF  tolerating dressings     no odor pain excess drainage  incontinent- multiple loose BM       Diet:  Diet, NPO with Tube Feed:   Tube Feeding Modality: Gastrostomy  Casie Black Peptide 1.5  Total Volume for 24 Hours (mL): 900  Continuous  Starting Tube Feed Rate mL per Hour: 50  Increase Tube Feed Rate by (mL): 0  Until Goal Tube Feed Rate (mL per Hour): 50  Tube Feed Duration (in Hours): 18  Tube Feed Start Time: 06:00  Tube Feed Stop Time: 03:00  Free Water Flush  Pump   Rate (mL per Hour): 10   Frequency: Every 2 Hours  Alfred(7 Gm Arginine/7 Gm Glut/1.2 Gm HMB     Qty per Day:  2 (11-10-23 @ 17:46)      ROS: pt unable to offer    ALLERGIES & MEDICATIONS  --------------------------------------------------------------------------------  Allergies  penicillin (Unknown)  heparin (Unknown)  Tagamet (Unknown)  pineapple (Unknown)  walnut (Unknown)  Andressa Rawls Hazelnut (Unknown)  Lyrica (Unknown)      STANDING INPATIENT MEDICATIONS  albuterol/ipratropium for Nebulization 3 milliLiter(s) Nebulizer every 6 hours  amLODIPine   Tablet 10 milliGRAM(s) Oral daily  artificial  tears Solution 2 Drop(s) Both EYES four times a day  ascorbic acid 500 milliGRAM(s) Oral daily  bacitracin   Ointment 1 Application(s) Topical two times a day  bisacodyl Suppository 10 milliGRAM(s) Rectal once  calcium carbonate    500 mG (Tums) Chewable 1 Tablet(s) Chew every 12 hours  cephalexin 500 milliGRAM(s) Oral every 6 hours  chlorhexidine 0.12% Liquid 15 milliLiter(s) Oral Mucosa every 12 hours  chlorhexidine 4% Liquid 1 Application(s) Topical <User Schedule>  ciprofloxacin   IVPB 400 milliGRAM(s) IV Intermittent every 12 hours  dextrose 50% Injectable 50 milliLiter(s) IV Push once  escitalopram 10 milliGRAM(s) Oral <User Schedule>  fentaNYL   Patch  25 MICROgram(s)/Hr 1 Patch Transdermal every 72 hours  gabapentin Solution 100 milliGRAM(s) Enteral Tube at bedtime  insulin glargine Injectable (LANTUS) 25 Unit(s) SubCutaneous every morning  insulin lispro (ADMELOG) corrective regimen sliding scale   SubCutaneous every 6 hours  levothyroxine 125 MICROGram(s) Oral <User Schedule>  lidocaine   4% Patch 1 Patch Transdermal daily  melatonin 3 milliGRAM(s) Oral at bedtime  multivitamin/minerals/iron Oral Solution (CENTRUM) 15 milliLiter(s) Oral daily  nystatin Powder 1 Application(s) Topical every 8 hours  oxybutynin 5 milliGRAM(s) Oral daily  pantoprazole   Suspension 40 milliGRAM(s) Enteral Tube <User Schedule>  petrolatum Ophthalmic Ointment 1 Application(s) Both EYES four times a day  polyethylene glycol 3350 17 Gram(s) Oral two times a day  predniSONE   Tablet 5 milliGRAM(s) Oral daily  QUEtiapine 12.5 milliGRAM(s) Oral <User Schedule>  senna Syrup 15 milliLiter(s) Oral at bedtime  simethicone 80 milliGRAM(s) Chew four times a day  sodium chloride 3%  Inhalation 4 milliLiter(s) Inhalation every 12 hours  triamcinolone 0.1% Ointment 1 Application(s) Topical two times a day  zinc oxide 40% Paste 1 Application(s) Topical daily  zinc sulfate 220 milliGRAM(s) Oral daily      PRN INPATIENT MEDICATION  acetaminophen   Oral Liquid .. 1000 milliGRAM(s) Enteral Tube every 6 hours PRN  benzocaine 20% Spray 1 Spray(s) Topical four times a day PRN  diphenhydrAMINE Elixir 25 milliGRAM(s) Oral every 6 hours PRN  oxyCODONE    Solution 2.5 milliGRAM(s) Oral every 6 hours PRN  sodium chloride 0.65% Nasal 1 Spray(s) Both Nostrils every 6 hours PRN        VITALS/PHYSICAL EXAM  --------------------------------------------------------------------------------  T(C): 37.6 (21 Nov 2023 11:44), Max: 37.6 (21 Nov 2023 11:44)  T(F): 99.6 (21 Nov 2023 11:44), Max: 99.6 (21 Nov 2023 11:44)  HR: 101 (21 Nov 2023 11:44) (78 - 112)  BP: 153/74 (21 Nov 2023 11:44) (146/65 - 158/80)  BP(mean): --  RR: 16 (21 Nov 2023 11:44) (12 - 18)  SpO2: 100% (21 Nov 2023 11:44) (97% - 100%)    Parameters below as of 21 Nov 2023 11:44  Patient On (Oxygen Delivery Method): ventilator    NAD,  Alert, frail,  WD/ WN/ WG  Total Care Sport bed  HEENT:  NC/AT, EOMI, sclera clear, mucosa moist, throat clear, trach       w/o secretions or peritrach skin irration  Gastrointestinal: soft NT/ND (+)PEG w/o drainage or skin irration  : (+) primafit  Neurology:  nonverbal, no follow commands, paraplegic  Psych: calm/ appropriate  Musculoskeletal:  pROM, no deformities/ contractures   Vascular: BLE equally warm,  no cyanosis, clubbing nor acute ischemia  Skin:  moist w/ good turgor  Sacral stage 4pressure injury  4.5cm x 2.5cm x 1cm   moist granular wound bed   palpable but not exposed bone  no blistering   (+) serosanguinous drainage  No odor, erythema, increased warmth, tenderness, induration, fluctuance, nor crepitus        LABS/ CULTURES/ RADIOLOGY:              8.6    8.89  >-----------<  126      [11-21-23 @ 07:05]              29.1     136  |  97  |  14  ----------------------------<  140      [11-21-23 @ 07:05]  4.1   |  27  |  <0.30        Ca     9.5     [11-21-23 @ 07:05]      Mg     1.7     [11-21-23 @ 07:05]      Phos  4.0     [11-21-23 @ 07:05]                CAPILLARY BLOOD GLUCOSE      POCT Blood Glucose.: 209 mg/dL (21 Nov 2023 23:29)  POCT Blood Glucose.: 205 mg/dL (21 Nov 2023 17:33)  POCT Blood Glucose.: 318 mg/dL (21 Nov 2023 11:43)  POCT Blood Glucose.: 145 mg/dL (21 Nov 2023 06:12)                      A1C with Estimated Average Glucose Result: 7.5 % (10-28-23 @ 07:18)

## 2023-11-21 NOTE — PROGRESS NOTE ADULT - PROBLEM SELECTOR PLAN 7
- s/p Home Levemir 14 units in morning held on admission as noted w/ hypoglycemia   - c/w Lantus 14 units qAM  - Continue home regimen with sliding scale - s/p Home Levemir 14 units in morning held on admission as noted w/ hypoglycemia   - c/w Lantus 14 units qAM, FS-- 145>318>205__ increased Lantus to 25 U qam (11/21)  __   - Continue monitoring with sliding scale

## 2023-11-21 NOTE — PROGRESS NOTE ADULT - PROBLEM SELECTOR PLAN 2
On chronic vent with trach    Hypothyroidism: T4 is slightly low. and tsh is elevated. will raise synthroid to 125 mcg.  Management as per [facility] pulm team  RLQ pain: FU with abd x Ray

## 2023-11-21 NOTE — PROGRESS NOTE ADULT - ASSESSMENT
72 y/o F with PMHx of T2DM, HTN, HLD, anemia, hypothyroidism, RA, fibromyalgia, remote cerebral aneurysm repair, acute hypercapnic respiratory failure now with tracheostomy, PEG tube, and bedbound (trach change 8/18, PEG change 8/14), sacral pressure ulcer, BIBEMS from home for 6 day hx of bleeding from the tracheostomy and PEG tube with associated abdominal pain, recent history of auditory and visual hallucinations, and with chronic sacral decubitus ulcer. Exam notable for mild abdominal distention with LLQ and RLQ tenderness, tracheostomy in place with no obvious bleeding, PEG tube with scant amount of dried blood, at baseline neurologic status. Imaging showing no active bleeding around tracheostomy site, however was notable for mild thickening along PEG tube tract, sacral ulcer extending to the coccyx suggestive of possible osteomyelitis, and bibasilar patchy lung opacities with volume loss. Patient admitted for respiratory support, wound care of tracheostomy and PEG, and management of sacral osteomyelitis. No further Trach and peg site  bleeding noted since admission.  Pelvic MRI imaging also suggestive of Acute OM. Patient placed on long-term antibiotics with Infectious disease guidance.  Additionally treated with a 7d course of Cefepime due to PSA and Serratia tracheitis on 11/8-->11/15 and then resumed cipro/keflex.  Hospital course c/b Delirium for which Seroquel was added and home Lexapro continued. Tracheostomy changed to #9 Cuffed Portex by ENT 11/3.  Despite trach change patient remained with persistent air leak.  On 11/19, the trach was again changed due to leak and loss of volumes on vent.  Trach was changed to #8 distal cuffed Shiley.  Patient seen by Dermatology for back lesions.  One diagnosed as a seborrheic keratitis and the other a dermatofibroma. 1    11/18:  Attempted trach collar which was tolerated for over 2.5 hours  however ABG revealed acute respiratory acidosis.  Thus patient placed back on vent.  Subsequent ABG with mild improvement thus RR increased from 12 to 16.     11/20: c/w Trial of CPAP w high setting, wean as tolerated. Vent as needed . - c/w current abx. as per ID pharmacist, multiple drug interactions - recommend to change Cipro to IV   11/21-- reevaluated by GI for bleeding from PEG and RLQ abdominal pain -- no acute intervention       Wound Consult requested to assist w/ management of:  Sacral stage 4 pressure injury      Sacral wound- improving, continue w/Aquacel dressing QD  Trach Mngt as per RCU  PEG Mngt as per GI  BLE elevation  Abx per RCU/ ID  Moisturize intact skin w/ SWEEN cream BID  Nutrition Consult for optimization          encourage high quality protein, ayush/ prosource, MVI & Vit C to promote wound healing  Hyperglycemia -  ADA diet and  FS w/ ISS,  Continue turning and positioning w/ offloading assistive devices as per protocol  Buttock Kanchan BID and prn soiling        Continue w/ attends under pads and Pericare w/ emerson cath maintenance as per protocol  Waffle Cushion to chair when oob to chair  Continue w/ low air loss pressure redistribution bed surface   Pt will need Group 2 mattress on hospital bed and ROHO cushion for wheel chair upon discharge home  Care as per RCU, will follow w/ you  Upon discharge f/u as outpatient at [facility] [address] [place] [address] [phone]  d/w mary  RN team   Angela Anthony PA-C [id number]  Nights/ Weekends/ Holidays please call:  General Surgery Consult pager ([phone]) for emergencies  Wound PT for multilayer leg wrapping or VAC issues (x [phone])   I spent  35 minutes face to face w/ this pt of which more than 50% of the time was spent counseling & coordinating care of this pt.

## 2023-11-21 NOTE — PROGRESS NOTE ADULT - PROBLEM SELECTOR PLAN 1
Found w/ Bilateral PNA vs Atelectasis, and Possible OM Sacrum   S/p Fevers at home, Afebrile since admission   - S/p Chest CT (10/28):  c/w Bilateral PNA vs Atelectasis   - Started Empirically on Vanco, Aztreonam   - Blood cx: NGTD   - urine culture: negative   - Sputum cx + Pseudomonas aeruginosa, S. aureus  - Sputum Cx 11/6: + PSA and Serratia, Cefepime 11/8 -->11/15  - HDS, stable vitals signs, afebrile Found w/ Bilateral PNA vs Atelectasis, and Possible OM Sacrum   S/p Fevers at home, Afebrile since admission __ remains afebrile, no leucocytosis,   - S/p Chest CT (10/28):  c/w Bilateral PNA vs Atelectasis   - s/p Empirically on Vanco, Aztreonam   - Blood cx: 11/12 -- neg   - urine culture: negative   - Sputum cx + Pseudomonas aeruginosa, S. aureus  - Sputum Cx 11/6: + PSA and Serratia, Cefepime 11/8 -->11/15  - HDS, stable vitals signs, afebrile  -- see tx of sacral OM below

## 2023-11-22 LAB
ALBUMIN SERPL ELPH-MCNC: 3.4 G/DL — SIGNIFICANT CHANGE UP (ref 3.3–5)
ALBUMIN SERPL ELPH-MCNC: 3.4 G/DL — SIGNIFICANT CHANGE UP (ref 3.3–5)
ALP SERPL-CCNC: 62 U/L — SIGNIFICANT CHANGE UP (ref 40–120)
ALP SERPL-CCNC: 62 U/L — SIGNIFICANT CHANGE UP (ref 40–120)
ALT FLD-CCNC: 37 U/L — SIGNIFICANT CHANGE UP (ref 10–45)
ALT FLD-CCNC: 37 U/L — SIGNIFICANT CHANGE UP (ref 10–45)
ANION GAP SERPL CALC-SCNC: 11 MMOL/L — SIGNIFICANT CHANGE UP (ref 5–17)
ANION GAP SERPL CALC-SCNC: 11 MMOL/L — SIGNIFICANT CHANGE UP (ref 5–17)
APPEARANCE UR: CLEAR — SIGNIFICANT CHANGE UP
APPEARANCE UR: CLEAR — SIGNIFICANT CHANGE UP
AST SERPL-CCNC: 30 U/L — SIGNIFICANT CHANGE UP (ref 10–40)
AST SERPL-CCNC: 30 U/L — SIGNIFICANT CHANGE UP (ref 10–40)
BILIRUB SERPL-MCNC: 0.4 MG/DL — SIGNIFICANT CHANGE UP (ref 0.2–1.2)
BILIRUB SERPL-MCNC: 0.4 MG/DL — SIGNIFICANT CHANGE UP (ref 0.2–1.2)
BILIRUB UR-MCNC: NEGATIVE — SIGNIFICANT CHANGE UP
BILIRUB UR-MCNC: NEGATIVE — SIGNIFICANT CHANGE UP
BUN SERPL-MCNC: 17 MG/DL — SIGNIFICANT CHANGE UP (ref 7–23)
BUN SERPL-MCNC: 17 MG/DL — SIGNIFICANT CHANGE UP (ref 7–23)
CALCIUM SERPL-MCNC: 9.4 MG/DL — SIGNIFICANT CHANGE UP (ref 8.4–10.5)
CALCIUM SERPL-MCNC: 9.4 MG/DL — SIGNIFICANT CHANGE UP (ref 8.4–10.5)
CHLORIDE SERPL-SCNC: 94 MMOL/L — LOW (ref 96–108)
CHLORIDE SERPL-SCNC: 94 MMOL/L — LOW (ref 96–108)
CO2 SERPL-SCNC: 27 MMOL/L — SIGNIFICANT CHANGE UP (ref 22–31)
CO2 SERPL-SCNC: 27 MMOL/L — SIGNIFICANT CHANGE UP (ref 22–31)
COLOR SPEC: YELLOW — SIGNIFICANT CHANGE UP
COLOR SPEC: YELLOW — SIGNIFICANT CHANGE UP
CREAT SERPL-MCNC: <0.3 MG/DL — LOW (ref 0.5–1.3)
CREAT SERPL-MCNC: <0.3 MG/DL — LOW (ref 0.5–1.3)
DIFF PNL FLD: NEGATIVE — SIGNIFICANT CHANGE UP
DIFF PNL FLD: NEGATIVE — SIGNIFICANT CHANGE UP
EGFR: 113 ML/MIN/1.73M2 — SIGNIFICANT CHANGE UP
EGFR: 113 ML/MIN/1.73M2 — SIGNIFICANT CHANGE UP
GLUCOSE BLDC GLUCOMTR-MCNC: 273 MG/DL — HIGH (ref 70–99)
GLUCOSE BLDC GLUCOMTR-MCNC: 273 MG/DL — HIGH (ref 70–99)
GLUCOSE BLDC GLUCOMTR-MCNC: 294 MG/DL — HIGH (ref 70–99)
GLUCOSE BLDC GLUCOMTR-MCNC: 294 MG/DL — HIGH (ref 70–99)
GLUCOSE BLDC GLUCOMTR-MCNC: 389 MG/DL — HIGH (ref 70–99)
GLUCOSE BLDC GLUCOMTR-MCNC: 389 MG/DL — HIGH (ref 70–99)
GLUCOSE SERPL-MCNC: 296 MG/DL — HIGH (ref 70–99)
GLUCOSE SERPL-MCNC: 296 MG/DL — HIGH (ref 70–99)
GLUCOSE UR QL: NEGATIVE MG/DL — SIGNIFICANT CHANGE UP
GLUCOSE UR QL: NEGATIVE MG/DL — SIGNIFICANT CHANGE UP
GRAM STN FLD: ABNORMAL
GRAM STN FLD: ABNORMAL
HCT VFR BLD CALC: 28.5 % — LOW (ref 34.5–45)
HCT VFR BLD CALC: 28.5 % — LOW (ref 34.5–45)
HGB BLD-MCNC: 8.7 G/DL — LOW (ref 11.5–15.5)
HGB BLD-MCNC: 8.7 G/DL — LOW (ref 11.5–15.5)
KETONES UR-MCNC: NEGATIVE MG/DL — SIGNIFICANT CHANGE UP
KETONES UR-MCNC: NEGATIVE MG/DL — SIGNIFICANT CHANGE UP
LEUKOCYTE ESTERASE UR-ACNC: NEGATIVE — SIGNIFICANT CHANGE UP
LEUKOCYTE ESTERASE UR-ACNC: NEGATIVE — SIGNIFICANT CHANGE UP
MAGNESIUM SERPL-MCNC: 1.8 MG/DL — SIGNIFICANT CHANGE UP (ref 1.6–2.6)
MAGNESIUM SERPL-MCNC: 1.8 MG/DL — SIGNIFICANT CHANGE UP (ref 1.6–2.6)
MCHC RBC-ENTMCNC: 28.1 PG — SIGNIFICANT CHANGE UP (ref 27–34)
MCHC RBC-ENTMCNC: 28.1 PG — SIGNIFICANT CHANGE UP (ref 27–34)
MCHC RBC-ENTMCNC: 30.5 GM/DL — LOW (ref 32–36)
MCHC RBC-ENTMCNC: 30.5 GM/DL — LOW (ref 32–36)
MCV RBC AUTO: 91.9 FL — SIGNIFICANT CHANGE UP (ref 80–100)
MCV RBC AUTO: 91.9 FL — SIGNIFICANT CHANGE UP (ref 80–100)
NITRITE UR-MCNC: NEGATIVE — SIGNIFICANT CHANGE UP
NITRITE UR-MCNC: NEGATIVE — SIGNIFICANT CHANGE UP
NRBC # BLD: 0 /100 WBCS — SIGNIFICANT CHANGE UP (ref 0–0)
NRBC # BLD: 0 /100 WBCS — SIGNIFICANT CHANGE UP (ref 0–0)
PH UR: 6.5 — SIGNIFICANT CHANGE UP (ref 5–8)
PH UR: 6.5 — SIGNIFICANT CHANGE UP (ref 5–8)
PHOSPHATE SERPL-MCNC: 4.1 MG/DL — SIGNIFICANT CHANGE UP (ref 2.5–4.5)
PHOSPHATE SERPL-MCNC: 4.1 MG/DL — SIGNIFICANT CHANGE UP (ref 2.5–4.5)
PLATELET # BLD AUTO: 136 K/UL — LOW (ref 150–400)
PLATELET # BLD AUTO: 136 K/UL — LOW (ref 150–400)
POTASSIUM SERPL-MCNC: 4.3 MMOL/L — SIGNIFICANT CHANGE UP (ref 3.5–5.3)
POTASSIUM SERPL-MCNC: 4.3 MMOL/L — SIGNIFICANT CHANGE UP (ref 3.5–5.3)
POTASSIUM SERPL-SCNC: 4.3 MMOL/L — SIGNIFICANT CHANGE UP (ref 3.5–5.3)
POTASSIUM SERPL-SCNC: 4.3 MMOL/L — SIGNIFICANT CHANGE UP (ref 3.5–5.3)
PROT SERPL-MCNC: 7.5 G/DL — SIGNIFICANT CHANGE UP (ref 6–8.3)
PROT SERPL-MCNC: 7.5 G/DL — SIGNIFICANT CHANGE UP (ref 6–8.3)
PROT UR-MCNC: SIGNIFICANT CHANGE UP MG/DL
PROT UR-MCNC: SIGNIFICANT CHANGE UP MG/DL
RBC # BLD: 3.1 M/UL — LOW (ref 3.8–5.2)
RBC # BLD: 3.1 M/UL — LOW (ref 3.8–5.2)
RBC # FLD: 17.8 % — HIGH (ref 10.3–14.5)
RBC # FLD: 17.8 % — HIGH (ref 10.3–14.5)
SODIUM SERPL-SCNC: 132 MMOL/L — LOW (ref 135–145)
SODIUM SERPL-SCNC: 132 MMOL/L — LOW (ref 135–145)
SP GR SPEC: 1.01 — SIGNIFICANT CHANGE UP (ref 1–1.03)
SP GR SPEC: 1.01 — SIGNIFICANT CHANGE UP (ref 1–1.03)
SPECIMEN SOURCE: SIGNIFICANT CHANGE UP
SPECIMEN SOURCE: SIGNIFICANT CHANGE UP
UROBILINOGEN FLD QL: 1 MG/DL — SIGNIFICANT CHANGE UP (ref 0.2–1)
UROBILINOGEN FLD QL: 1 MG/DL — SIGNIFICANT CHANGE UP (ref 0.2–1)
WBC # BLD: 7.47 K/UL — SIGNIFICANT CHANGE UP (ref 3.8–10.5)
WBC # BLD: 7.47 K/UL — SIGNIFICANT CHANGE UP (ref 3.8–10.5)
WBC # FLD AUTO: 7.47 K/UL — SIGNIFICANT CHANGE UP (ref 3.8–10.5)
WBC # FLD AUTO: 7.47 K/UL — SIGNIFICANT CHANGE UP (ref 3.8–10.5)

## 2023-11-22 PROCEDURE — 99233 SBSQ HOSP IP/OBS HIGH 50: CPT

## 2023-11-22 RX ORDER — POLYETHYLENE GLYCOL 3350 17 G/17G
17 POWDER, FOR SOLUTION ORAL DAILY
Refills: 0 | Status: DISCONTINUED | OUTPATIENT
Start: 2023-11-23 | End: 2023-11-24

## 2023-11-22 RX ADMIN — Medication 2 DROP(S): at 12:38

## 2023-11-22 RX ADMIN — Medication 1000 MILLIGRAM(S): at 04:00

## 2023-11-22 RX ADMIN — SIMETHICONE 80 MILLIGRAM(S): 80 TABLET, CHEWABLE ORAL at 17:58

## 2023-11-22 RX ADMIN — Medication 1 APPLICATION(S): at 04:54

## 2023-11-22 RX ADMIN — Medication 500 MILLIGRAM(S): at 12:43

## 2023-11-22 RX ADMIN — Medication 1 APPLICATION(S): at 17:59

## 2023-11-22 RX ADMIN — Medication 10: at 12:39

## 2023-11-22 RX ADMIN — Medication 6: at 05:48

## 2023-11-22 RX ADMIN — Medication 3 MILLILITER(S): at 11:19

## 2023-11-22 RX ADMIN — Medication 500 MILLIGRAM(S): at 12:38

## 2023-11-22 RX ADMIN — SIMETHICONE 80 MILLIGRAM(S): 80 TABLET, CHEWABLE ORAL at 04:51

## 2023-11-22 RX ADMIN — Medication 1 TABLET(S): at 17:58

## 2023-11-22 RX ADMIN — LIDOCAINE 1 PATCH: 4 CREAM TOPICAL at 19:00

## 2023-11-22 RX ADMIN — Medication 3 MILLIGRAM(S): at 21:40

## 2023-11-22 RX ADMIN — Medication 5 MILLIGRAM(S): at 04:52

## 2023-11-22 RX ADMIN — QUETIAPINE FUMARATE 12.5 MILLIGRAM(S): 200 TABLET, FILM COATED ORAL at 18:05

## 2023-11-22 RX ADMIN — CHLORHEXIDINE GLUCONATE 15 MILLILITER(S): 213 SOLUTION TOPICAL at 04:55

## 2023-11-22 RX ADMIN — LIDOCAINE 1 PATCH: 4 CREAM TOPICAL at 12:35

## 2023-11-22 RX ADMIN — Medication 125 MICROGRAM(S): at 04:52

## 2023-11-22 RX ADMIN — Medication 2 DROP(S): at 04:56

## 2023-11-22 RX ADMIN — INSULIN GLARGINE 25 UNIT(S): 100 INJECTION, SOLUTION SUBCUTANEOUS at 05:49

## 2023-11-22 RX ADMIN — SODIUM CHLORIDE 4 MILLILITER(S): 9 INJECTION INTRAMUSCULAR; INTRAVENOUS; SUBCUTANEOUS at 05:50

## 2023-11-22 RX ADMIN — SODIUM CHLORIDE 4 MILLILITER(S): 9 INJECTION INTRAMUSCULAR; INTRAVENOUS; SUBCUTANEOUS at 17:38

## 2023-11-22 RX ADMIN — Medication 5 MILLIGRAM(S): at 12:38

## 2023-11-22 RX ADMIN — PANTOPRAZOLE SODIUM 40 MILLIGRAM(S): 20 TABLET, DELAYED RELEASE ORAL at 04:52

## 2023-11-22 RX ADMIN — Medication 200 MILLIGRAM(S): at 17:57

## 2023-11-22 RX ADMIN — Medication 500 MILLIGRAM(S): at 04:52

## 2023-11-22 RX ADMIN — Medication 3 MILLILITER(S): at 05:50

## 2023-11-22 RX ADMIN — NYSTATIN CREAM 1 APPLICATION(S): 100000 CREAM TOPICAL at 21:39

## 2023-11-22 RX ADMIN — NYSTATIN CREAM 1 APPLICATION(S): 100000 CREAM TOPICAL at 14:02

## 2023-11-22 RX ADMIN — Medication 15 MILLILITER(S): at 12:37

## 2023-11-22 RX ADMIN — Medication 1 APPLICATION(S): at 04:55

## 2023-11-22 RX ADMIN — GABAPENTIN 100 MILLIGRAM(S): 400 CAPSULE ORAL at 21:40

## 2023-11-22 RX ADMIN — Medication 6: at 17:59

## 2023-11-22 RX ADMIN — Medication 200 MILLIGRAM(S): at 04:54

## 2023-11-22 RX ADMIN — Medication 3 MILLILITER(S): at 17:38

## 2023-11-22 RX ADMIN — ESCITALOPRAM OXALATE 10 MILLIGRAM(S): 10 TABLET, FILM COATED ORAL at 08:29

## 2023-11-22 RX ADMIN — Medication 1 TABLET(S): at 04:51

## 2023-11-22 RX ADMIN — ZINC OXIDE 1 APPLICATION(S): 200 OINTMENT TOPICAL at 12:38

## 2023-11-22 RX ADMIN — NYSTATIN CREAM 1 APPLICATION(S): 100000 CREAM TOPICAL at 04:54

## 2023-11-22 RX ADMIN — ZINC SULFATE TAB 220 MG (50 MG ZINC EQUIVALENT) 220 MILLIGRAM(S): 220 (50 ZN) TAB at 12:38

## 2023-11-22 RX ADMIN — CHLORHEXIDINE GLUCONATE 15 MILLILITER(S): 213 SOLUTION TOPICAL at 17:59

## 2023-11-22 RX ADMIN — Medication 1 APPLICATION(S): at 12:39

## 2023-11-22 RX ADMIN — Medication 1000 MILLIGRAM(S): at 02:38

## 2023-11-22 RX ADMIN — Medication 500 MILLIGRAM(S): at 17:58

## 2023-11-22 RX ADMIN — Medication 2 DROP(S): at 17:58

## 2023-11-22 RX ADMIN — Medication 3 MILLILITER(S): at 23:40

## 2023-11-22 RX ADMIN — SIMETHICONE 80 MILLIGRAM(S): 80 TABLET, CHEWABLE ORAL at 12:37

## 2023-11-22 RX ADMIN — CHLORHEXIDINE GLUCONATE 1 APPLICATION(S): 213 SOLUTION TOPICAL at 04:55

## 2023-11-22 RX ADMIN — AMLODIPINE BESYLATE 10 MILLIGRAM(S): 2.5 TABLET ORAL at 04:52

## 2023-11-22 NOTE — PROGRESS NOTE ADULT - SUBJECTIVE AND OBJECTIVE BOX
Patient is a 73y old  Female who presents with a chief complaint of Bleeding from tracheostomy and PEG tube (2023 08:15)      Interval Events:    REVIEW OF SYSTEMS:  [ ] Positive  [ ] All other systems negative  [ ] Unable to assess ROS because ________    Vital Signs Last 24 Hrs  T(C): 36.1 (23 @ 11:58), Max: 38.4 (23 @ 02:06)  T(F): 97 (23 @ 11:58), Max: 101.1 (23 @ 02:06)  HR: 84 (23 @ 11:58) (71 - 96)  BP: 100/59 (23 @ 11:58) (100/59 - 175/83)  RR: 20 (23 @ 11:58) (20 - 20)  SpO2: 98% (23 @ 11:58) (98% - 100%)    PHYSICAL EXAM:  HEENT:   [ ]Tracheostomy:  [ ]Pupils equal  [ ]No oral lesions  [ ]Abnormal    SKIN  [ ]No Rash  [ ] Abnormal  [ ] pressure    CARDIAC  [ ]Regular  [ ]Abnormal    PULMONARY  [ ]Bilateral Clear Breath Sounds  [ ]Normal Excursion  [ ]Abnormal    GI  [ ]PEG      [ ] +BS		              [ ]Soft, nondistended, nontender	  [ ]Abnormal    MUSCULOSKELETAL                                   [ ]Bedbound                 [ ]Abnormal    [ ]Ambulatory/OOB to chair                           EXTREMITIES                                         [ ]Normal  [ ]Edema                           NEUROLOGIC  [ ] Normal, non focal  [ ] Focal findings:    PSYCHIATRIC  [ ]Alert and appropriate  [ ] Sedated	 [ ]Agitated    :  Mcbride: [ ] Yes, if yes: Date of Placement:                   [  ] No    LINES: Central Lines [ ] Yes, if yes: Date of Placement                                     [  ] No    HOSPITAL MEDICATIONS:  MEDICATIONS  (STANDING):  albuterol/ipratropium for Nebulization 3 milliLiter(s) Nebulizer every 6 hours  amLODIPine   Tablet 10 milliGRAM(s) Oral daily  artificial  tears Solution 2 Drop(s) Both EYES four times a day  ascorbic acid 500 milliGRAM(s) Oral daily  bisacodyl Suppository 10 milliGRAM(s) Rectal once  calcium carbonate    500 mG (Tums) Chewable 1 Tablet(s) Chew every 12 hours  cephalexin 500 milliGRAM(s) Oral every 6 hours  chlorhexidine 0.12% Liquid 15 milliLiter(s) Oral Mucosa every 12 hours  chlorhexidine 4% Liquid 1 Application(s) Topical <User Schedule>  ciprofloxacin   IVPB 400 milliGRAM(s) IV Intermittent every 12 hours  dextrose 50% Injectable 50 milliLiter(s) IV Push once  escitalopram 10 milliGRAM(s) Oral <User Schedule>  fentaNYL   Patch  25 MICROgram(s)/Hr 1 Patch Transdermal every 72 hours  gabapentin Solution 100 milliGRAM(s) Enteral Tube at bedtime  insulin glargine Injectable (LANTUS) 25 Unit(s) SubCutaneous every morning  insulin lispro (ADMELOG) corrective regimen sliding scale   SubCutaneous every 6 hours  levothyroxine 125 MICROGram(s) Oral <User Schedule>  lidocaine   4% Patch 1 Patch Transdermal daily  melatonin 3 milliGRAM(s) Oral at bedtime  multivitamin/minerals/iron Oral Solution (CENTRUM) 15 milliLiter(s) Oral daily  nystatin Powder 1 Application(s) Topical every 8 hours  oxybutynin 5 milliGRAM(s) Oral daily  pantoprazole   Suspension 40 milliGRAM(s) Enteral Tube <User Schedule>  petrolatum Ophthalmic Ointment 1 Application(s) Both EYES four times a day  predniSONE   Tablet 5 milliGRAM(s) Oral daily  QUEtiapine 12.5 milliGRAM(s) Oral <User Schedule>  senna Syrup 15 milliLiter(s) Oral at bedtime  simethicone 80 milliGRAM(s) Chew four times a day  sodium chloride 3%  Inhalation 4 milliLiter(s) Inhalation every 12 hours  triamcinolone 0.1% Ointment 1 Application(s) Topical two times a day  zinc oxide 40% Paste 1 Application(s) Topical daily  zinc sulfate 220 milliGRAM(s) Oral daily    MEDICATIONS  (PRN):  acetaminophen   Oral Liquid .. 1000 milliGRAM(s) Enteral Tube every 6 hours PRN Temp greater or equal to 38C (100.4F), Mild Pain (1 - 3)  benzocaine 20% Spray 1 Spray(s) Topical four times a day PRN Cough  diphenhydrAMINE Elixir 25 milliGRAM(s) Oral every 6 hours PRN Rash and/or Itching  oxyCODONE    Solution 2.5 milliGRAM(s) Oral every 6 hours PRN Moderate Pain (4 - 6)  sodium chloride 0.65% Nasal 1 Spray(s) Both Nostrils every 6 hours PRN Nasal Congestion      LABS:                        8.6    8.89  )-----------( 126      ( 2023 07:05 )             29.1     11-    136  |  97  |  14  ----------------------------<  140<H>  4.1   |  27  |  <0.30<L>    Ca    9.5      2023 07:05  Phos  4.0       Mg     1.7             Urinalysis Basic - ( 2023 06:41 )    Color: Yellow / Appearance: Clear / S.015 / pH: x  Gluc: x / Ketone: Negative mg/dL  / Bili: Negative / Urobili: 1.0 mg/dL   Blood: x / Protein: Trace mg/dL / Nitrite: Negative   Leuk Esterase: Negative / RBC: x / WBC x   Sq Epi: x / Non Sq Epi: x / Bacteria: x          CAPILLARY BLOOD GLUCOSE    MICROBIOLOGY:     RADIOLOGY:  [ ] Reviewed and interpreted by me    Mode: AC/ CMV (Assist Control/ Continuous Mandatory Ventilation)  RR (machine): 12  TV (machine): 300  FiO2: 30  PEEP: 5  ITime: 0.76  MAP: 8  PIP: 27   Patient is a 73y old  Female who presents with a chief complaint of Bleeding from tracheostomy and PEG tube (2023 08:15)      Interval Events: Fever of 101.1F over night.    REVIEW OF SYSTEMS:  [ ] Positive  [X] All other systems negative  [ ] Unable to assess ROS because ___    Vital Signs Last 24 Hrs  T(C): 36.1 (23 @ 11:58), Max: 38.4 (23 @ 02:06)  T(F): 97 (23 @ 11:58), Max: 101.1 (23 @ 02:06)  HR: 84 (23 @ 11:58) (71 - 96)  BP: 100/59 (23 @ 11:58) (100/59 - 175/83)  RR: 20 (23 @ 11:58) (20 - 20)  SpO2: 98% (23 @ 11:58) (98% - 100%)      PHYSICAL EXAM:  HEENT:   [X] Tracheostomy:  #8 distal XLT Shiley  [X] 4mm PERRL B/L  [X] No oral lesions  [ ] Abnormal    SKIN  [ ] No Rash  [ ]  Abnormal  [X] pressure: Sacral wound    CARDIAC  [X] Regular  [ ] Abnormal    PULMONARY  [X] Bilateral Clear Breath Sounds  [ ] Normal Excursion  [ ] Abnormal    GI  [X] PEG      [X] +BS		              [X] Soft, nondistended, nontender	  [ ] Abnormal    MUSCULOSKELETAL                                   [  ] Bedbound                 [X] Abnormal:  Deconditioned but can partially move all limbs   [ ] Ambulatory/OOB to chair                           EXTREMITIES                                         [X] Normal  [ ]Edema                           NEUROLOGIC  [ ] Normal, non focal  [X] Focal findings:  Alert and Oriented x2; responds appropriately top questions by mouthing and able to phonate while on vent; B/L foot drop with partial ability to dorsiflex; moves both arms with minimal hand dexterity B/L    PSYCHIATRIC  [X] Alert; somewhat anxious at times but mostly appropriate  [ ] Sedated	 [ ]Agitated    :  Mcbride: [ ] Yes, if yes: Date of Placement:                   [X] No    LINES: Central Lines [ ] Yes, if yes: Date of Placement                                     [X] No        HOSPITAL MEDICATIONS:  MEDICATIONS  (STANDING):  albuterol/ipratropium for Nebulization 3 milliLiter(s) Nebulizer every 6 hours  amLODIPine   Tablet 10 milliGRAM(s) Oral daily  artificial  tears Solution 2 Drop(s) Both EYES four times a day  ascorbic acid 500 milliGRAM(s) Oral daily  bisacodyl Suppository 10 milliGRAM(s) Rectal once  calcium carbonate    500 mG (Tums) Chewable 1 Tablet(s) Chew every 12 hours  cephalexin 500 milliGRAM(s) Oral every 6 hours  chlorhexidine 0.12% Liquid 15 milliLiter(s) Oral Mucosa every 12 hours  chlorhexidine 4% Liquid 1 Application(s) Topical <User Schedule>  ciprofloxacin   IVPB 400 milliGRAM(s) IV Intermittent every 12 hours  dextrose 50% Injectable 50 milliLiter(s) IV Push once  escitalopram 10 milliGRAM(s) Oral <User Schedule>  fentaNYL   Patch  25 MICROgram(s)/Hr 1 Patch Transdermal every 72 hours  gabapentin Solution 100 milliGRAM(s) Enteral Tube at bedtime  insulin glargine Injectable (LANTUS) 25 Unit(s) SubCutaneous every morning  insulin lispro (ADMELOG) corrective regimen sliding scale   SubCutaneous every 6 hours  levothyroxine 125 MICROGram(s) Oral <User Schedule>  lidocaine   4% Patch 1 Patch Transdermal daily  melatonin 3 milliGRAM(s) Oral at bedtime  multivitamin/minerals/iron Oral Solution (CENTRUM) 15 milliLiter(s) Oral daily  nystatin Powder 1 Application(s) Topical every 8 hours  oxybutynin 5 milliGRAM(s) Oral daily  pantoprazole   Suspension 40 milliGRAM(s) Enteral Tube <User Schedule>  petrolatum Ophthalmic Ointment 1 Application(s) Both EYES four times a day  predniSONE   Tablet 5 milliGRAM(s) Oral daily  QUEtiapine 12.5 milliGRAM(s) Oral <User Schedule>  senna Syrup 15 milliLiter(s) Oral at bedtime  simethicone 80 milliGRAM(s) Chew four times a day  sodium chloride 3%  Inhalation 4 milliLiter(s) Inhalation every 12 hours  triamcinolone 0.1% Ointment 1 Application(s) Topical two times a day  zinc oxide 40% Paste 1 Application(s) Topical daily  zinc sulfate 220 milliGRAM(s) Oral daily    MEDICATIONS  (PRN):  acetaminophen   Oral Liquid .. 1000 milliGRAM(s) Enteral Tube every 6 hours PRN Temp greater or equal to 38C (100.4F), Mild Pain (1 - 3)  benzocaine 20% Spray 1 Spray(s) Topical four times a day PRN Cough  diphenhydrAMINE Elixir 25 milliGRAM(s) Oral every 6 hours PRN Rash and/or Itching  oxyCODONE    Solution 2.5 milliGRAM(s) Oral every 6 hours PRN Moderate Pain (4 - 6)  sodium chloride 0.65% Nasal 1 Spray(s) Both Nostrils every 6 hours PRN Nasal Congestion      LABS:                        8.6    8.89  )-----------( 126      ( 2023 07:05 )             29.1     11-21    136  |  97  |  14  ----------------------------<  140<H>  4.1   |  27  |  <0.30<L>    Ca    9.5      2023 07:05  Phos  4.0     11-21  Mg     1.7     11-21        Urinalysis Basic - ( 2023 06:41 )    Color: Yellow / Appearance: Clear / S.015 / pH: x  Gluc: x / Ketone: Negative mg/dL  / Bili: Negative / Urobili: 1.0 mg/dL   Blood: x / Protein: Trace mg/dL / Nitrite: Negative   Leuk Esterase: Negative / RBC: x / WBC x   Sq Epi: x / Non Sq Epi: x / Bacteria: x          CAPILLARY BLOOD GLUCOSE    MICROBIOLOGY:     RADIOLOGY:  [ ] Reviewed and interpreted by me    Mode: AC/ CMV (Assist Control/ Continuous Mandatory Ventilation)  RR (machine): 12  TV (machine): 300  FiO2: 30  PEEP: 5  ITime: 0.76  MAP: 8  PIP: 27

## 2023-11-22 NOTE — PROGRESS NOTE ADULT - PROBLEM SELECTOR PLAN 4
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 Multifactorial, possible the PEG has been too tight at home.  - Peg loosened by GI at beside, no leakage or further intervention required   -- recurrent RLQ abdominal pain and bleeding at the PEG site>> __   -- reevaluated by GI __ -- no bleeding at the PEG site  - no BM since 11/19-- increased dose of Senna and increased Miralax to BID   -- - Continue Simethicone, cont monitoring

## 2023-11-22 NOTE — PROGRESS NOTE ADULT - ASSESSMENT
72 y/o F with PMHx of T2DM, HTN, HLD, anemia, hypothyroidism, RA, fibromyalgia, remote cerebral aneurysm repair, acute hypercapnic respiratory failure now with tracheostomy, PEG tube, and bedbound (trach change 8/18, PEG change 8/14), sacral pressure ulcer, BIBEMS from home for 6 day hx of bleeding from the tracheostomy and PEG tube with associated abdominal pain, recent history of auditory and visual hallucinations, and with chronic sacral decubitus ulcer. Exam notable for mild abdominal distention with LLQ and RLQ tenderness, tracheostomy in place with no obvious bleeding, PEG tube with scant amount of dried blood, at baseline neurologic status. Imaging showing no active bleeding around tracheostomy site, however was notable for mild thickening along PEG tube tract, sacral ulcer extending to the coccyx suggestive of possible osteomyelitis, and bibasilar patchy lung opacities with volume loss. Patient admitted for respiratory support, wound care of tracheostomy and PEG, and management of sacral osteomyelitis. No further Trach and peg site  bleeding noted since admission.  Pelvic MRI imaging also suggestive of Acute OM. Patient placed on long-term antibiotics with Infectious disease guidance.  Additionally treated with a 7d course of Cefepime due to PSA and Serratia tracheitis on 11/8-->11/15 and then resumed cipro/keflex.  Hospital course c/b Delirium for which Seroquel was added and home Lexapro continued. Tracheostomy changed to #9 Cuffed [place] by ENT 11/3.  Despite trach change patient remained with persistent air leak.  On 11/19, the trach was again changed due to leak and loss of volumes on vent.  Trach was changed to #8 distal cuffed Shiley.  Patient seen by Dermatology for back lesions.  One diagnosed as a seborrheic keratitis and the other a dermatofibroma. 1    11/18:  Attempted trach collar which was tolerated for over 2.5 hours  however ABG revealed acute respiratory acidosis.  Thus patient placed back on vent.  Subsequent ABG with mild improvement thus RR increased from 12 to 16.     11/20: c/w Trial of CPAP w high setting, will wean as tolerated. Vent as needed . - c/w current abx. as per ID pharmacist, multiple drug interactions - recommend to change Cipro to IV   11/21-- reevaluated by GI for bleeding from PEG and RLQ abdominal pain -- no acute intervention            74 y/o F with PMHx of T2DM, HTN, HLD, anemia, hypothyroidism, RA, fibromyalgia, remote cerebral aneurysm repair, acute hypercapnic respiratory failure now with tracheostomy, PEG tube, and bedbound (trach change 8/18, PEG change 8/14), sacral pressure ulcer, BIBEMS from home for 6 day hx of bleeding from the tracheostomy and PEG tube with associated abdominal pain, recent history of auditory and visual hallucinations, and with chronic sacral decubitus ulcer. Exam notable for mild abdominal distention with LLQ and RLQ tenderness, tracheostomy in place with no obvious bleeding, PEG tube with scant amount of dried blood, at baseline neurologic status. Imaging showing no active bleeding around tracheostomy site, however was notable for mild thickening along PEG tube tract, sacral ulcer extending to the coccyx suggestive of possible osteomyelitis, and bibasilar patchy lung opacities with volume loss. Patient admitted for respiratory support, wound care of tracheostomy and PEG, and management of sacral osteomyelitis. No further Trach and peg site  bleeding noted since admission.  Pelvic MRI imaging also suggestive of Acute OM. Patient placed on long-term antibiotics with Infectious disease guidance.  Additionally treated with a 7d course of Cefepime due to PSA and Serratia tracheitis on 11/8-->11/15 and then resumed cipro/keflex.  Hospital course c/b Delirium for which Seroquel was added and home Lexapro continued. Tracheostomy changed to #9 Cuffed [place] by ENT 11/3.  Despite trach change patient remained with persistent air leak.  On 11/19, the trach was again changed due to leak and loss of volumes on vent.  Trach was changed to #8 distal cuffed Shiley.  Patient seen by Dermatology for back lesions.  One diagnosed as a seborrheic keratitis and the other a dermatofibroma.     11/22:  Fever over night of 101.1F, cultures obtained.  No changes to antibiotic regimen made at this time.  Endocrine consulted for DM management as FS poorly responding despite increase in insulin.

## 2023-11-22 NOTE — PROGRESS NOTE ADULT - ASSESSMENT
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 Bleeding from gastrostomy.   reassessed site, no bleeding noted     2. Leakage at PEG site.   mild exudative fluid noted   can try maalox soaked gauze pads bid-tid     3. Abdominal pain w/Intermittent Diarrhea.  CT negative for acute pathology.  AXR no acute pathology   Send stool studies if diarrhea continues     4. Fever Workup.   AXR no acute pathology   f/u BCx  care per medicine team appreciated

## 2023-11-22 NOTE — PROGRESS NOTE ADULT - PROBLEM SELECTOR PLAN 8
Continue Home Lexapro 10mg daily   Psych consult appreciated  Continue Seroquel 12.5mg BID Continue Home Lexapro 10mg daily   Psych consult appreciated  Continue Seroquel 12.5mg daily (3PM)

## 2023-11-22 NOTE — PROGRESS NOTE ADULT - SUBJECTIVE AND OBJECTIVE BOX
Patient is a 73y old  Female who presents with a chief complaint of peg tube   DATE OF SERVICE: 11-22-23 @ 13:02      SUBJECTIVE / OVERNIGHT EVENTS: no new events  Febrile overnight    MEDICATIONS  (STANDING):  albuterol/ipratropium for Nebulization 3 milliLiter(s) Nebulizer every 6 hours  artificial  tears Solution 2 Drop(s) Both EYES four times a day  ascorbic acid 500 milliGRAM(s) Oral daily  bacitracin   Ointment 1 Application(s) Topical two times a day  bisacodyl Suppository 10 milliGRAM(s) Rectal once  calcium carbonate    500 mG (Tums) Chewable 1 Tablet(s) Chew every 12 hours  cephalexin 500 milliGRAM(s) Oral every 6 hours  chlorhexidine 0.12% Liquid 15 milliLiter(s) Oral Mucosa every 12 hours  chlorhexidine 4% Liquid 1 Application(s) Topical <User Schedule>  ciprofloxacin     Tablet 500 milliGRAM(s) Oral every 12 hours  dextrose 50% Injectable 50 milliLiter(s) IV Push once  enoxaparin Injectable 40 milliGRAM(s) SubCutaneous every 24 hours  escitalopram 10 milliGRAM(s) Oral daily  fentaNYL   Patch  25 MICROgram(s)/Hr 1 Patch Transdermal every 72 hours  gabapentin Solution 100 milliGRAM(s) Enteral Tube at bedtime  insulin glargine Injectable (LANTUS) 14 Unit(s) SubCutaneous every morning  insulin lispro (ADMELOG) corrective regimen sliding scale   SubCutaneous every 6 hours  levothyroxine 125 MICROGram(s) Oral <User Schedule>  lidocaine   4% Patch 1 Patch Transdermal daily  melatonin 3 milliGRAM(s) Oral at bedtime  multivitamin/minerals/iron Oral Solution (CENTRUM) 15 milliLiter(s) Oral daily  nystatin Powder 1 Application(s) Topical every 8 hours  oxybutynin 5 milliGRAM(s) Oral daily  pantoprazole   Suspension 40 milliGRAM(s) Enteral Tube daily  petrolatum Ophthalmic Ointment 1 Application(s) Both EYES four times a day  predniSONE   Tablet 5 milliGRAM(s) Oral daily  QUEtiapine 12.5 milliGRAM(s) Oral <User Schedule>  QUEtiapine 12.5 milliGRAM(s) Oral <User Schedule>  senna Syrup 10 milliLiter(s) Oral at bedtime  simethicone 80 milliGRAM(s) Chew four times a day  sodium chloride 3%  Inhalation 4 milliLiter(s) Inhalation every 12 hours  triamcinolone 0.1% Ointment 1 Application(s) Topical two times a day  zinc oxide 40% Paste 1 Application(s) Topical daily  zinc sulfate 220 milliGRAM(s) Oral daily    MEDICATIONS  (PRN):  acetaminophen   Oral Liquid .. 1000 milliGRAM(s) Enteral Tube every 6 hours PRN Temp greater or equal to 38C (100.4F), Mild Pain (1 - 3)  benzocaine 20% Spray 1 Spray(s) Topical four times a day PRN Cough  diphenhydrAMINE Elixir 25 milliGRAM(s) Oral every 6 hours PRN Rash and/or Itching  oxyCODONE    Solution 2.5 milliGRAM(s) Oral every 6 hours PRN Moderate Pain (4 - 6)  sodium chloride 0.65% Nasal 1 Spray(s) Both Nostrils every 6 hours PRN Nasal Congestion      Vital Signs Last 24 Hrs  ICU Vital Signs Last 24 Hrs  T(C): 36.1 (22 Nov 2023 11:58), Max: 38.4 (22 Nov 2023 02:06)  T(F): 97 (22 Nov 2023 11:58), Max: 101.1 (22 Nov 2023 02:06)  HR: 84 (22 Nov 2023 11:58) (71 - 96)  BP: 100/59 (22 Nov 2023 11:58) (100/59 - 175/83)  BP(mean): --  ABP: --  ABP(mean): --  RR: 20 (22 Nov 2023 11:58) (20 - 20)  SpO2: 98% (22 Nov 2023 11:58) (98% - 100%)    O2 Parameters below as of 22 Nov 2023 11:58  Patient On (Oxygen Delivery Method): ventilator        CAPILLARY BLOOD GLUCOSE      POCT Blood Glucose.: 145 mg/dL (21 Nov 2023 06:12)  POCT Blood Glucose.: 219 mg/dL (20 Nov 2023 23:59)  POCT Blood Glucose.: 240 mg/dL (20 Nov 2023 18:09)  POCT Blood Glucose.: 273 mg/dL (20 Nov 2023 13:01)      I&O's Summary    17 Nov 2023 07:01  -  18 Nov 2023 07:00  --------------------------------------------------------  IN: 1300 mL / OUT: 950 mL / NET: 350 mL    18 Nov 2023 07:01  -  18 Nov 2023 17:32  --------------------------------------------------------  IN: 0 mL / OUT: 400 mL / NET: -400 mL        PHYSICAL EXAM:  GENERAL: NAD, well-developed  HEAD:  Atraumatic, Normocephalic  EYES: EOMI, PERRLA, conjunctiva and sclera clear  NECK: Supple, No JVD. On mech vent with a tracheostomy  CHEST/LUNG: Clear to auscultation bilaterally; No wheeze  HEART: Regular rate and rhythm; No murmurs, rubs, or gallops  ABDOMEN: Soft, Nontender, Nondistended; Bowel sounds present  EXTREMITIES:  2+ Peripheral Pulses, No clubbing, cyanosis, or edema  PSYCH: AAOx3  NEUROLOGY: non-focal. Functionally quadriplegic  SKIN: No rashes or lesions    LABS:                                 8.6    8.89  )-----------( 126      ( 21 Nov 2023 07:05 )             29.1                8.9    9.19  )-----------( 124      ( 18 Nov 2023 09:15 )             30.4     11-18    137  |  99  |  29<H>  ----------------------------<  224<H>  4.1   |  28  |  <0.30<L>    Ca    10.0      18 Nov 2023 09:15  Mg     1.8     11-18    TPro  7.4  /  Alb  3.3  /  TBili  0.3  /  DBili  x   /  AST  19  /  ALT  18  /  AlkPhos  48  11-18          Urinalysis Basic - ( 18 Nov 2023 09:15 )    Color: x / Appearance: x / SG: x / pH: x  Gluc: 224 mg/dL / Ketone: x  / Bili: x / Urobili: x   Blood: x / Protein: x / Nitrite: x   Leuk Esterase: x / RBC: x / WBC x   Sq Epi: x / Non Sq Epi: x / Bacteria: x        RADIOLOGY & ADDITIONAL TESTS:    Imaging Personally Reviewed:    Consultant(s) Notes Reviewed:      Care Discussed with Consultants/Other Providers:

## 2023-11-22 NOTE — PROVIDER CONTACT NOTE (OTHER) - ACTION/TREATMENT ORDERED:
PRN Tylenol suspension, continue to monitor for s/s of ifx PRN Tylenol suspension and cultures ordered

## 2023-11-22 NOTE — PROGRESS NOTE ADULT - SUBJECTIVE AND OBJECTIVE BOX
INTERVAL HPI/OVERNIGHT EVENTS:    events noted, febrile overnight and episode of Brown loose stool x1   peg functioning, no blood     MEDICATIONS  (STANDING):  albuterol/ipratropium for Nebulization 3 milliLiter(s) Nebulizer every 6 hours  amLODIPine   Tablet 10 milliGRAM(s) Oral daily  artificial  tears Solution 2 Drop(s) Both EYES four times a day  ascorbic acid 500 milliGRAM(s) Oral daily  bisacodyl Suppository 10 milliGRAM(s) Rectal once  calcium carbonate    500 mG (Tums) Chewable 1 Tablet(s) Chew every 12 hours  cephalexin 500 milliGRAM(s) Oral every 6 hours  chlorhexidine 0.12% Liquid 15 milliLiter(s) Oral Mucosa every 12 hours  chlorhexidine 4% Liquid 1 Application(s) Topical <User Schedule>  ciprofloxacin   IVPB 400 milliGRAM(s) IV Intermittent every 12 hours  dextrose 50% Injectable 50 milliLiter(s) IV Push once  escitalopram 10 milliGRAM(s) Oral <User Schedule>  fentaNYL   Patch  25 MICROgram(s)/Hr 1 Patch Transdermal every 72 hours  gabapentin Solution 100 milliGRAM(s) Enteral Tube at bedtime  insulin glargine Injectable (LANTUS) 25 Unit(s) SubCutaneous every morning  insulin lispro (ADMELOG) corrective regimen sliding scale   SubCutaneous every 6 hours  levothyroxine 125 MICROGram(s) Oral <User Schedule>  lidocaine   4% Patch 1 Patch Transdermal daily  melatonin 3 milliGRAM(s) Oral at bedtime  multivitamin/minerals/iron Oral Solution (CENTRUM) 15 milliLiter(s) Oral daily  nystatin Powder 1 Application(s) Topical every 8 hours  oxybutynin 5 milliGRAM(s) Oral daily  pantoprazole   Suspension 40 milliGRAM(s) Enteral Tube <User Schedule>  petrolatum Ophthalmic Ointment 1 Application(s) Both EYES four times a day  polyethylene glycol 3350 17 Gram(s) Oral two times a day  predniSONE   Tablet 5 milliGRAM(s) Oral daily  QUEtiapine 12.5 milliGRAM(s) Oral <User Schedule>  senna Syrup 15 milliLiter(s) Oral at bedtime  simethicone 80 milliGRAM(s) Chew four times a day  sodium chloride 3%  Inhalation 4 milliLiter(s) Inhalation every 12 hours  triamcinolone 0.1% Ointment 1 Application(s) Topical two times a day  zinc oxide 40% Paste 1 Application(s) Topical daily  zinc sulfate 220 milliGRAM(s) Oral daily    MEDICATIONS  (PRN):  acetaminophen   Oral Liquid .. 1000 milliGRAM(s) Enteral Tube every 6 hours PRN Temp greater or equal to 38C (100.4F), Mild Pain (1 - 3)  benzocaine 20% Spray 1 Spray(s) Topical four times a day PRN Cough  diphenhydrAMINE Elixir 25 milliGRAM(s) Oral every 6 hours PRN Rash and/or Itching  oxyCODONE    Solution 2.5 milliGRAM(s) Oral every 6 hours PRN Moderate Pain (4 - 6)  sodium chloride 0.65% Nasal 1 Spray(s) Both Nostrils every 6 hours PRN Nasal Congestion      Allergies    penicillin (Unknown)  heparin (Unknown)  Tagamet (Unknown)  pineapple (Unknown)  walnut (Unknown)  Pecan, Filbert, Hazelnut (Unknown)  Lyrica (Unknown)    Intolerances        Review of Systems:    General:  No wt loss, fevers, chills, night sweats, fatigue   Eyes:  Good vision, no reported pain  ENT:  No sore throat, pain, runny nose, dysphagia  CV:  No pain, palpitations, hypo/hypertension  Resp:  No dyspnea, cough, tachypnea, wheezing  GI:  No pain, No nausea, No vomiting, No diarrhea, No constipation, No weight loss, No fever, No pruritis, No rectal bleeding, No melena, No dysphagia  :  No pain, bleeding, incontinence, nocturia  Muscle:  No pain, weakness  Neuro:  No weakness, tingling, memory problems  Psych:  No fatigue, insomnia, mood problems, depression  Endocrine:  No polyuria, polydypsia, cold/heat intolerance  Heme:  No petechiae, ecchymosis, easy bruisability  Skin:  No rash, tattoos, scars, edema      Vital Signs Last 24 Hrs  T(C): 36.1 (2023 11:58), Max: 38.4 (2023 02:06)  T(F): 97 (2023 11:58), Max: 101.1 (2023 02:06)  HR: 84 (2023 11:58) (71 - 96)  BP: 100/59 (2023 11:58) (100/59 - 175/83)  BP(mean): --  RR: 20 (2023 11:58) (20 - 20)  SpO2: 98% (2023 11:58) (98% - 100%)    Parameters below as of 2023 11:58  Patient On (Oxygen Delivery Method): ventilator        PHYSICAL EXAM:    Constitutional: NAD  HEENT: EOMI, throat clear  Neck: No LAD, supple +Trach  Respiratory: CTA and P  Cardiovascular: S1 and S2, RRR, no M  Gastrointestinal: BS+, soft, NT/ND, neg HSM, +peg   Extremities: No peripheral edema, neg clubbing, cyanosis  Vascular: 2+ peripheral pulses  Neurological: A/O, no focal deficits  Psychiatric: Normal mood, normal affect  Skin: No rashes      LABS:                        8.6    8.89  )-----------( 126      ( 2023 07:05 )             29.1         136  |  97  |  14  ----------------------------<  140<H>  4.1   |  27  |  <0.30<L>    Ca    9.5      2023 07:05  Phos  4.0       Mg     1.7             Urinalysis Basic - ( 2023 06:41 )    Color: Yellow / Appearance: Clear / S.015 / pH: x  Gluc: x / Ketone: Negative mg/dL  / Bili: Negative / Urobili: 1.0 mg/dL   Blood: x / Protein: Trace mg/dL / Nitrite: Negative   Leuk Esterase: Negative / RBC: x / WBC x   Sq Epi: x / Non Sq Epi: x / Bacteria: x        RADIOLOGY & ADDITIONAL TESTS:  < from: Xray Abdomen 1 View PORTABLE -Urgent (Xray Abdomen 1 View PORTABLE -Urgent .) (23 @ 10:46) >    ACC: [id number] EXAM:  XR ABDOMEN PORTABLE URGENT 1V   ORDERED BY: SUGEY MALIK     PROCEDURE DATE:  2023          INTERPRETATION:  CLINICAL INFORMATION: Concern for obstruction.    EXAM: single frontal view of the abdomen.    COMPARISON: Abdominal x-ray 10/22/2020    FINDINGS:  A G-tube overlies the upper abdomen. Nonobstructive bowel gas pattern.  There is no evidence of intraperitoneal free air on this single supine   radiograph.  The visualized osseous structures demonstrate no acute pathology.    IMPRESSION:  Nonobstructive bowel gas pattern.    --- End of Report ---           TRACY NI MD; Resident Radiologist  This document has been electronically signed.  MARK EDWARDS MD; Attending Radiologist  This document has been electronically signed. 2023 12:11PM    < end of copied text >

## 2023-11-22 NOTE — PROGRESS NOTE ADULT - NS ATTEND AMEND GEN_ALL_CORE FT
73F with a h/o DM, HTN, HLD, anemia, hypothyroidism, RA, fibromyalgia, remote cerebral aneurysm with repair, hypercapnic respiratory failure s/p tracheostomy/PEG, bedbound, sacral pressure ulcer. Admitted w/ bleeding from tracheostomy and PEG w/ associated abdominal pain, recent hx of auditory/visual hallucinations. Since admission, no further bleeding noted from either trach or PEG. Imaging showed mild thickening along PEG tube tract and sacral ulcer extending to coccyx suggestive of OM.        # Osteomyelitis  # Chronic hypoxemic RF  # oropharyngeal dysphagia  # acute on chronic pain  # Trach/Peg bleeding  # Tracheitis with Pseudomonas and serratia  # Hx of hallucination, anxiety    - mental status appropriate today, attempting to follow commands,  very weakly sampson  3+  - MRI showing sacral Osteo,  c/w wound care, no plans for surgery   -  s/p  course of Cefepime on 11/15 for sputum culture with PsA and serratia,   - now on Cipro and Keflex until December, f/u ID reccs   - f/u wound care reccs, improving wound, cont tx plan and outpt f/u  - c/w Airway clearance -  Duonebs and 3% Hypersal, chest PT  - monitor for signs of trach site bleeding, none noted today, contact ENT if  bleeding noted   - does have only blood tinged secretions on suctioning due to suction trauma  - PS trials during the day, vent at night, unable to tolerate TC yet  - C/W pain control, multifactorial 2/2 to fibromyalgia as well as now osteo with pressure ulcer   - Peg irritation seen by GI again today, no bleeding and rec maalox gauze around site, H/H stable  - negative AXR today  - Thrombocytopenia - improved has been stable since admission at current level    - Appreciate  follow up for delirium and hallucinations, patient calm and appropriate today - c/w Seroquel 12.5 mg in early evening and  Lexapro in AM   - Corneal erythema improved, no evidence of discharge, completed a course of Cipro eye drops, c/w artificial tears  - Appreciate derm consult for right back skin lesion- dermatofibroma, benign lesion. Mid back with irritated seborrheic keratosis, topical triamcinolone 0.1% bid cont, outpt f/u w/ Derm  - FS quite elvated despite increase in lantus and no other change in diet or meds (only topical steroid) will get Endo input  - DVT ppx- scd (lovenox d/c after d/w Daughter given the prior blood she had from trach and peg sites)     GOC: when pt more alert will rediscuss GOC (when lucid in past she had desired DNR approach)

## 2023-11-22 NOTE — PROGRESS NOTE ADULT - PROBLEM SELECTOR PLAN 7
- s/p Home Levemir 14 units in morning held on admission as noted w/ hypoglycemia   - c/w Lantus 14 units qAM, FS-- 145>318>205__ increased Lantus to 25 U qam (11/21)  __   - Continue monitoring with sliding scale - s/p Home Levemir 14 units in morning held on admission as noted w/ hypoglycemia   - increased Lantus to 25 U qam (11/21)   - Continue monitoring with sliding scale

## 2023-11-22 NOTE — PROGRESS NOTE ADULT - PROBLEM SELECTOR PLAN 5
- Home amlodipine held on admission,)__ restarted today -- continue monitoring BP   - Echo from 10/17/2020 notable for hyperdynamic L ventricular systolic function, moderately severe LVOT obstruction, and EF 81% (per Wilkinson calculation) vs 77% (per Teichholz calculation)

## 2023-11-22 NOTE — PROVIDER CONTACT NOTE (OTHER) - BACKGROUND
Pt currently on antibiotics for osteomyelitis, last cultures drawn 11/19 Pt currently on antibiotics for osteomyelitis

## 2023-11-22 NOTE — PROGRESS NOTE ADULT - PROBLEM SELECTOR PLAN 9
Lovenox for DVT ppx -- held today in s/o persistent thrombocytopenia _ call hematology consultation in am,     [facility]: Full code  __ long discussion of MICU team with daughter, pt remains FULL CODE, daughter wishes MOLST form reevaluation once pt is less delirious

## 2023-11-22 NOTE — PROGRESS NOTE ADULT - PROBLEM SELECTOR PLAN 1
Found w/ Bilateral PNA vs Atelectasis, and Possible OM Sacrum   S/p Fevers at home, Afebrile since admission __ remains afebrile, no leucocytosis,   - S/p Chest CT (10/28):  c/w Bilateral PNA vs Atelectasis   - s/p Empirically on Vanco, Aztreonam   - Blood cx: 11/12 -- neg   - urine culture: negative   - Sputum cx + Pseudomonas aeruginosa, S. aureus  - Sputum Cx 11/6: + PSA and Serratia, Cefepime 11/8 -->11/15  - HDS, stable vitals signs, afebrile  -- see tx of sacral OM below

## 2023-11-22 NOTE — PROGRESS NOTE ADULT - PROBLEM SELECTOR PLAN 6
-Continue home Prednisone regimen 5 mg daily   *fibromyalgia  -continue home Gabapentin 100mg daily  -continue home Fentanyl 25mcg Q72H patches  - c/w Lidocaine patch   - Oxycodone 2.5mg q6 hrs PRN (in addition to Tylenol PRN)

## 2023-11-23 LAB
ANION GAP SERPL CALC-SCNC: 12 MMOL/L — SIGNIFICANT CHANGE UP (ref 5–17)
ANION GAP SERPL CALC-SCNC: 12 MMOL/L — SIGNIFICANT CHANGE UP (ref 5–17)
BUN SERPL-MCNC: 17 MG/DL — SIGNIFICANT CHANGE UP (ref 7–23)
BUN SERPL-MCNC: 17 MG/DL — SIGNIFICANT CHANGE UP (ref 7–23)
CALCIUM SERPL-MCNC: 8.9 MG/DL — SIGNIFICANT CHANGE UP (ref 8.4–10.5)
CALCIUM SERPL-MCNC: 8.9 MG/DL — SIGNIFICANT CHANGE UP (ref 8.4–10.5)
CHLORIDE SERPL-SCNC: 92 MMOL/L — LOW (ref 96–108)
CHLORIDE SERPL-SCNC: 92 MMOL/L — LOW (ref 96–108)
CO2 SERPL-SCNC: 27 MMOL/L — SIGNIFICANT CHANGE UP (ref 22–31)
CO2 SERPL-SCNC: 27 MMOL/L — SIGNIFICANT CHANGE UP (ref 22–31)
CREAT SERPL-MCNC: <0.3 MG/DL — LOW (ref 0.5–1.3)
CREAT SERPL-MCNC: <0.3 MG/DL — LOW (ref 0.5–1.3)
CULTURE RESULTS: SIGNIFICANT CHANGE UP
CULTURE RESULTS: SIGNIFICANT CHANGE UP
EGFR: 113 ML/MIN/1.73M2 — SIGNIFICANT CHANGE UP
EGFR: 113 ML/MIN/1.73M2 — SIGNIFICANT CHANGE UP
GLUCOSE BLDC GLUCOMTR-MCNC: 219 MG/DL — HIGH (ref 70–99)
GLUCOSE BLDC GLUCOMTR-MCNC: 219 MG/DL — HIGH (ref 70–99)
GLUCOSE BLDC GLUCOMTR-MCNC: 245 MG/DL — HIGH (ref 70–99)
GLUCOSE BLDC GLUCOMTR-MCNC: 245 MG/DL — HIGH (ref 70–99)
GLUCOSE BLDC GLUCOMTR-MCNC: 288 MG/DL — HIGH (ref 70–99)
GLUCOSE BLDC GLUCOMTR-MCNC: 288 MG/DL — HIGH (ref 70–99)
GLUCOSE BLDC GLUCOMTR-MCNC: 294 MG/DL — HIGH (ref 70–99)
GLUCOSE BLDC GLUCOMTR-MCNC: 294 MG/DL — HIGH (ref 70–99)
GLUCOSE BLDC GLUCOMTR-MCNC: 320 MG/DL — HIGH (ref 70–99)
GLUCOSE BLDC GLUCOMTR-MCNC: 320 MG/DL — HIGH (ref 70–99)
GLUCOSE BLDC GLUCOMTR-MCNC: 419 MG/DL — HIGH (ref 70–99)
GLUCOSE BLDC GLUCOMTR-MCNC: 419 MG/DL — HIGH (ref 70–99)
GLUCOSE BLDC GLUCOMTR-MCNC: 467 MG/DL — CRITICAL HIGH (ref 70–99)
GLUCOSE BLDC GLUCOMTR-MCNC: 467 MG/DL — CRITICAL HIGH (ref 70–99)
GLUCOSE SERPL-MCNC: 306 MG/DL — HIGH (ref 70–99)
GLUCOSE SERPL-MCNC: 306 MG/DL — HIGH (ref 70–99)
MAGNESIUM SERPL-MCNC: 1.6 MG/DL — SIGNIFICANT CHANGE UP (ref 1.6–2.6)
MAGNESIUM SERPL-MCNC: 1.6 MG/DL — SIGNIFICANT CHANGE UP (ref 1.6–2.6)
PHOSPHATE SERPL-MCNC: 3.2 MG/DL — SIGNIFICANT CHANGE UP (ref 2.5–4.5)
PHOSPHATE SERPL-MCNC: 3.2 MG/DL — SIGNIFICANT CHANGE UP (ref 2.5–4.5)
POTASSIUM SERPL-MCNC: 4.1 MMOL/L — SIGNIFICANT CHANGE UP (ref 3.5–5.3)
POTASSIUM SERPL-MCNC: 4.1 MMOL/L — SIGNIFICANT CHANGE UP (ref 3.5–5.3)
POTASSIUM SERPL-SCNC: 4.1 MMOL/L — SIGNIFICANT CHANGE UP (ref 3.5–5.3)
POTASSIUM SERPL-SCNC: 4.1 MMOL/L — SIGNIFICANT CHANGE UP (ref 3.5–5.3)
SODIUM SERPL-SCNC: 131 MMOL/L — LOW (ref 135–145)
SODIUM SERPL-SCNC: 131 MMOL/L — LOW (ref 135–145)
SPECIMEN SOURCE: SIGNIFICANT CHANGE UP
SPECIMEN SOURCE: SIGNIFICANT CHANGE UP

## 2023-11-23 PROCEDURE — 99232 SBSQ HOSP IP/OBS MODERATE 35: CPT

## 2023-11-23 RX ADMIN — SENNA PLUS 15 MILLILITER(S): 8.6 TABLET ORAL at 22:32

## 2023-11-23 RX ADMIN — AMLODIPINE BESYLATE 10 MILLIGRAM(S): 2.5 TABLET ORAL at 06:27

## 2023-11-23 RX ADMIN — Medication 1 APPLICATION(S): at 06:25

## 2023-11-23 RX ADMIN — Medication 2 DROP(S): at 00:51

## 2023-11-23 RX ADMIN — SIMETHICONE 80 MILLIGRAM(S): 80 TABLET, CHEWABLE ORAL at 17:45

## 2023-11-23 RX ADMIN — Medication 2 DROP(S): at 17:46

## 2023-11-23 RX ADMIN — Medication 1 TABLET(S): at 17:45

## 2023-11-23 RX ADMIN — Medication 15 MILLILITER(S): at 11:38

## 2023-11-23 RX ADMIN — NYSTATIN CREAM 1 APPLICATION(S): 100000 CREAM TOPICAL at 14:31

## 2023-11-23 RX ADMIN — CHLORHEXIDINE GLUCONATE 1 APPLICATION(S): 213 SOLUTION TOPICAL at 06:26

## 2023-11-23 RX ADMIN — Medication 1 APPLICATION(S): at 00:51

## 2023-11-23 RX ADMIN — NYSTATIN CREAM 1 APPLICATION(S): 100000 CREAM TOPICAL at 22:32

## 2023-11-23 RX ADMIN — QUETIAPINE FUMARATE 12.5 MILLIGRAM(S): 200 TABLET, FILM COATED ORAL at 17:45

## 2023-11-23 RX ADMIN — PANTOPRAZOLE SODIUM 40 MILLIGRAM(S): 20 TABLET, DELAYED RELEASE ORAL at 06:24

## 2023-11-23 RX ADMIN — Medication 200 MILLIGRAM(S): at 17:53

## 2023-11-23 RX ADMIN — Medication 500 MILLIGRAM(S): at 11:38

## 2023-11-23 RX ADMIN — SIMETHICONE 80 MILLIGRAM(S): 80 TABLET, CHEWABLE ORAL at 06:24

## 2023-11-23 RX ADMIN — SODIUM CHLORIDE 4 MILLILITER(S): 9 INJECTION INTRAMUSCULAR; INTRAVENOUS; SUBCUTANEOUS at 17:42

## 2023-11-23 RX ADMIN — Medication 500 MILLIGRAM(S): at 17:45

## 2023-11-23 RX ADMIN — ESCITALOPRAM OXALATE 10 MILLIGRAM(S): 10 TABLET, FILM COATED ORAL at 07:43

## 2023-11-23 RX ADMIN — NYSTATIN CREAM 1 APPLICATION(S): 100000 CREAM TOPICAL at 06:26

## 2023-11-23 RX ADMIN — Medication 4: at 00:51

## 2023-11-23 RX ADMIN — Medication 3 MILLIGRAM(S): at 22:32

## 2023-11-23 RX ADMIN — SIMETHICONE 80 MILLIGRAM(S): 80 TABLET, CHEWABLE ORAL at 00:50

## 2023-11-23 RX ADMIN — ZINC OXIDE 1 APPLICATION(S): 200 OINTMENT TOPICAL at 11:40

## 2023-11-23 RX ADMIN — CHLORHEXIDINE GLUCONATE 15 MILLILITER(S): 213 SOLUTION TOPICAL at 06:25

## 2023-11-23 RX ADMIN — Medication 1 APPLICATION(S): at 17:46

## 2023-11-23 RX ADMIN — Medication 125 MICROGRAM(S): at 05:20

## 2023-11-23 RX ADMIN — Medication 200 MILLIGRAM(S): at 05:20

## 2023-11-23 RX ADMIN — Medication 500 MILLIGRAM(S): at 23:42

## 2023-11-23 RX ADMIN — Medication 500 MILLIGRAM(S): at 06:24

## 2023-11-23 RX ADMIN — Medication 1 APPLICATION(S): at 23:37

## 2023-11-23 RX ADMIN — GABAPENTIN 100 MILLIGRAM(S): 400 CAPSULE ORAL at 22:32

## 2023-11-23 RX ADMIN — Medication 3 MILLILITER(S): at 11:30

## 2023-11-23 RX ADMIN — Medication 2 DROP(S): at 11:40

## 2023-11-23 RX ADMIN — CHLORHEXIDINE GLUCONATE 15 MILLILITER(S): 213 SOLUTION TOPICAL at 17:47

## 2023-11-23 RX ADMIN — Medication 1000 MILLIGRAM(S): at 09:53

## 2023-11-23 RX ADMIN — LIDOCAINE 1 PATCH: 4 CREAM TOPICAL at 00:27

## 2023-11-23 RX ADMIN — Medication 6: at 18:39

## 2023-11-23 RX ADMIN — SIMETHICONE 80 MILLIGRAM(S): 80 TABLET, CHEWABLE ORAL at 11:52

## 2023-11-23 RX ADMIN — Medication 4: at 23:35

## 2023-11-23 RX ADMIN — Medication 5 MILLIGRAM(S): at 11:38

## 2023-11-23 RX ADMIN — Medication 2 DROP(S): at 06:25

## 2023-11-23 RX ADMIN — Medication 5 MILLIGRAM(S): at 06:24

## 2023-11-23 RX ADMIN — Medication 8: at 06:27

## 2023-11-23 RX ADMIN — Medication 3 MILLILITER(S): at 17:42

## 2023-11-23 RX ADMIN — Medication 1 TABLET(S): at 06:24

## 2023-11-23 RX ADMIN — SODIUM CHLORIDE 4 MILLILITER(S): 9 INJECTION INTRAMUSCULAR; INTRAVENOUS; SUBCUTANEOUS at 05:17

## 2023-11-23 RX ADMIN — Medication 1 APPLICATION(S): at 11:40

## 2023-11-23 RX ADMIN — Medication 1000 MILLIGRAM(S): at 10:20

## 2023-11-23 RX ADMIN — Medication 500 MILLIGRAM(S): at 00:50

## 2023-11-23 RX ADMIN — Medication 12: at 11:40

## 2023-11-23 RX ADMIN — INSULIN GLARGINE 25 UNIT(S): 100 INJECTION, SOLUTION SUBCUTANEOUS at 07:34

## 2023-11-23 RX ADMIN — Medication 3 MILLILITER(S): at 05:17

## 2023-11-23 RX ADMIN — Medication 2 DROP(S): at 23:36

## 2023-11-23 RX ADMIN — Medication 3 MILLILITER(S): at 23:55

## 2023-11-23 RX ADMIN — SIMETHICONE 80 MILLIGRAM(S): 80 TABLET, CHEWABLE ORAL at 23:41

## 2023-11-23 RX ADMIN — LIDOCAINE 1 PATCH: 4 CREAM TOPICAL at 11:39

## 2023-11-23 NOTE — PROGRESS NOTE ADULT - ASSESSMENT
74 y/o F with PMHx of T2DM, HTN, HLD, anemia, hypothyroidism, RA, fibromyalgia, remote cerebral aneurysm repair, acute hypercapnic respiratory failure now with tracheostomy, PEG tube, and bedbound (trach change 8/18, PEG change 8/14), sacral pressure ulcer, BIBEMS from home for 6 day hx of bleeding from the tracheostomy and PEG tube with associated abdominal pain, recent history of auditory and visual hallucinations, and with chronic sacral decubitus ulcer. Exam notable for mild abdominal distention with LLQ and RLQ tenderness, tracheostomy in place with no obvious bleeding, PEG tube with scant amount of dried blood, at baseline neurologic status. Imaging showing no active bleeding around tracheostomy site, however was notable for mild thickening along PEG tube tract, sacral ulcer extending to the coccyx suggestive of possible osteomyelitis, and bibasilar patchy lung opacities with volume loss. Patient admitted for respiratory support, wound care of tracheostomy and PEG, and management of sacral osteomyelitis. No further Trach and peg site  bleeding noted since admission.  Pelvic MRI imaging also suggestive of Acute OM. Patient placed on long-term antibiotics with Infectious disease guidance.  Additionally treated with a 7d course of Cefepime due to PSA and Serratia tracheitis on 11/8-->11/15 and then resumed cipro/keflex.  Hospital course c/b Delirium for which Seroquel was added and home Lexapro continued. Tracheostomy changed to #9 Cuffed [place] by ENT 11/3.  Despite trach change patient remained with persistent air leak.  On 11/19, the trach was again changed due to leak and loss of volumes on vent.  Trach was changed to #8 distal cuffed [place].  Patient seen by Dermatology for back lesions.  One diagnosed as a seborrheic keratitis and the other a dermatofibroma.     11/22:  Fever over night of 101.1F, cultures obtained.  No changes to antibiotic regimen made at this time.  Endocrine consulted for DM management as FS poorly responding despite increase in insulin.          72 y/o F with PMHx of T2DM, HTN, HLD, anemia, hypothyroidism, RA, fibromyalgia, remote cerebral aneurysm repair, acute hypercapnic respiratory failure now with tracheostomy, PEG tube, and bedbound (trach change 8/18, PEG change 8/14), sacral pressure ulcer, BIBEMS from home for 6 day hx of bleeding from the tracheostomy and PEG tube with associated abdominal pain, recent history of auditory and visual hallucinations, and with chronic sacral decubitus ulcer. Exam notable for mild abdominal distention with LLQ and RLQ tenderness, tracheostomy in place with no obvious bleeding, PEG tube with scant amount of dried blood, at baseline neurologic status. Imaging showing no active bleeding around tracheostomy site, however was notable for mild thickening along PEG tube tract, sacral ulcer extending to the coccyx suggestive of possible osteomyelitis, and bibasilar patchy lung opacities with volume loss. Patient admitted for respiratory support, wound care of tracheostomy and PEG, and management of sacral osteomyelitis. No further Trach and peg site  bleeding noted since admission.  Pelvic MRI imaging also suggestive of Acute OM. Patient placed on long-term antibiotics with Infectious disease guidance.  Additionally treated with a 7d course of Cefepime due to PSA and Serratia tracheitis on 11/8-->11/15 and then resumed cipro/keflex.  Hospital course c/b Delirium for which Seroquel was added and home Lexapro continued. Tracheostomy changed to #9 Cuffed [place] by ENT 11/3.  Despite trach change patient remained with persistent air leak.  On 11/19, the trach was again changed due to leak and loss of volumes on vent.  Trach was changed to #8 distal cuffed [place].  Patient seen by Dermatology for back lesions.  One diagnosed as a seborrheic keratitis and the other a dermatofibroma.     11/22:  Fever over night of 101.1F, cultures obtained.  No changes to antibiotic regimen made at this time.  Endocrine consulted for DM management as FS poorly responding despite increase in insulin.   11/23-no changes o/n         74 y/o F with PMHx of T2DM, HTN, HLD, anemia, hypothyroidism, RA, fibromyalgia, remote cerebral aneurysm repair, acute hypercapnic respiratory failure now with tracheostomy, PEG tube, and bedbound (trach change 8/18, PEG change 8/14), sacral pressure ulcer, BIBEMS from home for 6 day hx of bleeding from the tracheostomy and PEG tube with associated abdominal pain, recent history of auditory and visual hallucinations, and with chronic sacral decubitus ulcer. Exam notable for mild abdominal distention with LLQ and RLQ tenderness, tracheostomy in place with no obvious bleeding, PEG tube with scant amount of dried blood, at baseline neurologic status. Imaging showing no active bleeding around tracheostomy site, however was notable for mild thickening along PEG tube tract, sacral ulcer extending to the coccyx suggestive of possible osteomyelitis, and bibasilar patchy lung opacities with volume loss. Patient admitted for respiratory support, wound care of tracheostomy and PEG, and management of sacral osteomyelitis. No further Trach and peg site  bleeding noted since admission.  Pelvic MRI imaging also suggestive of Acute OM. Patient placed on long-term antibiotics with Infectious disease guidance.  Additionally treated with a 7d course of Cefepime due to PSA and Serratia tracheitis on 11/8-->11/15 and then resumed cipro/keflex.  Hospital course c/b Delirium for which Seroquel was added and home Lexapro continued. Tracheostomy changed to #9 Cuffed [place] by ENT 11/3.  Despite trach change patient remained with persistent air leak.  On 11/19, the trach was again changed due to leak and loss of volumes on vent.  Trach was changed to #8 distal cuffed Shiley.  Patient seen by Dermatology for back lesions.  One diagnosed as a seborrheic keratitis and the other a dermatofibroma.     11/23: Cultures NGTD.  No changes to antibiotic regimen made at this time.  Endocrine consulted for DM management. Enteral feeding changed to [employer] diabetic formula. Heme consulted for anemia and thrombocytopenia, follow-up labs.

## 2023-11-23 NOTE — PROGRESS NOTE ADULT - NS ATTEND AMEND GEN_ALL_CORE FT
73F with a h/o DM, HTN, HLD, anemia, hypothyroidism, RA, fibromyalgia, remote cerebral aneurysm with repair, hypercapnic respiratory failure s/p tracheostomy/PEG, bedbound, sacral pressure ulcer. Admitted w/ bleeding from tracheostomy and PEG w/ associated abdominal pain, recent hx of auditory/visual hallucinations. Since admission, no further bleeding noted from either trach or PEG. Imaging showed mild thickening along PEG tube tract and sacral ulcer extending to coccyx suggestive of OM.        # Osteomyelitis  # Chronic hypoxemic RF  # oropharyngeal dysphagia  # acute on chronic pain  # Trach/Peg bleeding  # Tracheitis with Pseudomonas and serratia  # Hx of hallucination, anxiety    - mental status appropriate today, attempting to follow commands,  very weakly sampson  3+  - MRI showing sacral Osteo,  c/w wound care, no plans for surgery   -  s/p  course of Cefepime on 11/15 for sputum culture with PsA and serratia,   - now on Cipro and Keflex until December, f/u ID reccs   - f/u wound care reccs, improving wound, cont tx plan and outpt f/u  - c/w Airway clearance -  Duonebs and 3% Hypersal, chest PT  - monitor for signs of trach site bleeding, none noted today, contact ENT if  bleeding noted   - does have only blood tinged secretions on suctioning due to suction trauma  - PS trials during the day, vent at night, unable to tolerate TC yet  - C/W pain control, multifactorial 2/2 to fibromyalgia as well as now osteo with pressure ulcer   - Peg irritation seen by GI again today, no bleeding and rec maalox gauze around site, H/H stable  - negative AXR today  - Thrombocytopenia - improved has been stable since admission at current level    - Appreciate  follow up for delirium and hallucinations, patient calm and appropriate today - c/w Seroquel 12.5 mg in early evening and  Lexapro in AM   - Corneal erythema improved, no evidence of discharge, completed a course of Cipro eye drops, c/w artificial tears  - Appreciate derm consult for right back skin lesion- dermatofibroma, benign lesion. Mid back with irritated seborrheic keratosis, topical triamcinolone 0.1% bid cont, outpt f/u w/ Derm  - FS quite elvated despite increase in lantus and no other change in diet or meds (only topical steroid) will get Endo input  - DVT ppx- scd (lovenox d/c after d/w Daughter given the prior blood she had from trach and peg sites)     GOC: when pt more alert will rediscuss GOC (when lucid in past she had desired DNR approach) as above:  73F with a h/o DM, HTN, HLD, anemia, hypothyroidism, RA, fibromyalgia, remote cerebral aneurysm with repair, hypercapnic respiratory failure s/p tracheostomy/PEG, bedbound, sacral pressure ulcer. Admitted w/ bleeding from tracheostomy and PEG w/ associated abdominal pain, recent hx of auditory/visual hallucinations. Since admission, no further bleeding noted from either trach or PEG. Imaging showed mild thickening along PEG tube tract and sacral ulcer extending to coccyx suggestive of OM.        # Osteomyelitis  # Chronic hypoxemic RF  # oropharyngeal dysphagia  # acute on chronic pain  # Trach/Peg bleeding  # Tracheitis with Pseudomonas and serratia  # Hx of hallucination, anxiety    Neuro- mental status appropriate, attempting to follow commands,  very weakly sampson  3+  ortho- MRI showing sacral Osteo,  c/w wound care, no plans for surgery   ID-  s/p  course of Cefepime on 11/15 for sputum culture with PsA and serratia, now on Cipro and Keflex until December, f/u ID   Wound care- f/u wound care reccs, improving wound, cont tx plan and outpt f/u  Resp- c/w Airway clearance -Duonebs and 3% Hypersal, chest PT,- PS trials during the day, vent at night, unable to tolerate TC yet  ENT- monitor for signs of trach site bleeding, none noted contact ENT if  bleeding noted (blood tinged due to suction trauma)  Pain- C/W pain control, multifactorial 2/2 to fibromyalgia as well as now osteo with pressure ulcer   GI- Peg irritation seen by GI again today, no bleeding and rec maalox gauze around site, H/H stable- negative AXR 11/22  Heme- Thrombocytopenia - improved has been stable since admission at current level    Neuro- Appreciate  follow up for delirium and hallucinations, patient calm and appropriate today - c/w Seroquel 12.5 mg in           early evening and  Lexapro in AM   optho- Corneal erythema improved, no evidence of discharge, completed a course of Cipro eye drops, c/w artificial tears  Derm- Appreciate derm consult for right back skin lesion- dermatofibroma, benign lesion. Mid back with irritated seborrheic             keratosis, topical triamcinolone 0.1% bid cont, outpt f/u w/ Derm  Endo- FS quite elvated despite increase in lantus and no other change in diet or meds (only topical steroid) will get Endo input  - DVT ppx- scd (lovenox d/c after d/w Daughter given the prior blood she had from trach and peg sites)     GOC: when pt more alert will rediscuss GOC (when lucid in past she had desired DNR approach)  Cesar Hernandez MD-Pulmonary   [phone]

## 2023-11-23 NOTE — PROVIDER CONTACT NOTE (OTHER) - BACKGROUND
Hx of HTN, HLD, ANEMIA, T2 DM, resp failure, trached and on the vent, admitted for peg site and trach bleeding,

## 2023-11-23 NOTE — CONSULT NOTE ADULT - ASSESSMENT
73-year-old female here with prolonged admission. She has thrombocytopenia. It is mild and has been present since admission. Levels PTA unknown. This could be due to medications such as Escitalopram. Could be ITP. Could be other etiologies as well. No splenomegaly. She was getting Lovenox prior and it was stopped a couple of days ago. Unlikely HIT as was present on admission, nonetheless would check a HIT. Also check an LDH and haptoglobin. Also has anemia. Creatinine not elevated. Normal bilirubin. Likely is some AOCD process. Will order iron studies, B12, folate, and serum KUNAL. Hgb and platelets adequate at this time and can monitor. patient to be see by Dr. Sullivan et al tomorrow for further hematology management.  73-year-old female here with prolonged admission. She has thrombocytopenia. It is mild and has been present since admission. Levels PTA unknown. This could be due to medications such as Escitalopram. Could be ITP. Could be other etiologies as well. No splenomegaly. She was getting Lovenox prior and it was stopped a couple of days ago. Unlikely HIT as was present on admission, nonetheless would check a HIT. Also check an LDH and haptoglobin. Also has anemia. Creatinine not elevated. Normal bilirubin. Likely is some AOCD process. Will order iron studies, B12, folate, and serum KUNAL. Hgb and platelets adequate at this time and can monitor. patient to be see by Dr. Sullivan et al tomorrow for further hematology management.     Spoke with daughter on phone and she says that she has been having anemia and thrombocytopenia for the past 1-2 years and she was seeing a hematologist for that but she retired.

## 2023-11-23 NOTE — PROGRESS NOTE ADULT - PROBLEM SELECTOR PLAN 9
Lovenox for DVT ppx -- held today in s/o persistent thrombocytopenia _ call hematology consultation in am,     [facility]: Full code  __ long discussion of MICU team with daughter, pt remains FULL CODE, daughter wishes MOLST form reevaluation once pt is less delirious

## 2023-11-23 NOTE — CHART NOTE - NSCHARTNOTEFT_GEN_A_CORE
Nutrition Follow Up Note  Patient seen for Verbal Consult for Tube Feeding Recommendations     Chart reviewed, events noted.    Nutrition-related concerns:  -- Verbal consult from NP; pt noted with elevated POCT Glucose today >/=400 mg/dl, requiring change in tube feeding formula.     Estimated Needs: Based on most recent weight of 52.3 kg/ 115.3 pounds (11/22/23)  27-32kcal/kg 3338-3099 kcal/day  1.2-1.4g/kg 62.76- 73.22 g protein/day  Defer fluid needs to team    Recommendations:  1. As tolerated, recommend Sensiotec Glucose Support 1.2 @  65mL/hr x18 hrs is reached. Regimen at goal provides 1,170 mL total volume, 1404 kcal/d and 74.88 g pro/day (26.8 kcal/kg and 1.43 g/kg) based on current weight 52.3 kg. Defer free water flushes to medical team   2. Continue Alfred 2x/day via PEG to aid in wound healing  3. Continue daily multivitamin and vitamin C supplements if no medical contraindications to aid in wound healing.   4. Monitor tolerance to enteral provision, GI status, skin integrity and labs.     RD remains available upon request and will follow up per protocol  Maria T Flores, MS,RDN,CDN AVAILABLE ON TEAMS

## 2023-11-23 NOTE — PROGRESS NOTE ADULT - PROBLEM SELECTOR PLAN 7
- s/p Home Levemir 14 units in morning held on admission as noted w/ hypoglycemia   - increased Lantus to 25 U qam (11/21)   - Continue monitoring with sliding scale - s/p Home Levemir 14 units in morning held on admission as noted w/ hypoglycemia   - increased Lantus to 25 U qam (11/21)   - Continue monitoring with sliding scale  - Endocrine consluted  - Enteral feed changed to ecoATM diabetic formula

## 2023-11-23 NOTE — PROGRESS NOTE ADULT - SUBJECTIVE AND OBJECTIVE BOX
Patient is a 73y old  Female who presents with a chief complaint of Bleeding from tracheostomy and PEG tube (21 Nov 2023 08:15)      Interval Events:    REVIEW OF SYSTEMS:  [ ] Positive  [ ] All other systems negative  [ ] Unable to assess ROS because ________    Vital Signs Last 24 Hrs  T(C): 37.7 (11-23-23 @ 05:30), Max: 37.7 (11-23-23 @ 05:30)  T(F): 99.9 (11-23-23 @ 05:30), Max: 99.9 (11-23-23 @ 05:30)  HR: 91 (11-23-23 @ 05:37) (71 - 111)  BP: 157/74 (11-23-23 @ 05:30) (100/59 - 157/74)  RR: 18 (11-23-23 @ 05:30) (18 - 20)  SpO2: 99% (11-23-23 @ 05:37) (98% - 100%)    PHYSICAL EXAM:  HEENT:   [ ]Tracheostomy:  [ ]Pupils equal  [ ]No oral lesions  [ ]Abnormal    SKIN  [ ] No Rash  [ ] Abnormal  [ ] pressure    CARDIAC  [ ]Regular  [ ]Abnormal    PULMONARY  [ ]Bilateral Clear Breath Sounds  [ ]Normal Excursion  [ ]Abnormal    GI  [ ]PEG      [ ] +BS		              [ ]Soft, nondistended, nontender	  [ ]Abnormal    MUSCULOSKELETAL                                   [ ]Bedbound                 [ ]Abnormal    [ ]Ambulatory/OOB to chair                           EXTREMITIES                                         [ ]Normal  [ ]Edema                           NEUROLOGIC  [ ] Normal, non focal  [ ] Focal findings:    PSYCHIATRIC  [ ]Alert and appropriate  [ ] Sedated	 [ ]Agitated    :  Reed: [ ] Yes, if yes: Date of Placement:                   [  ] No    LINES: Central Lines [ ] Yes, if yes: Date of Placement                                     [  ] No    HOSPITAL MEDICATIONS:  MEDICATIONS  (STANDING):  albuterol/ipratropium for Nebulization 3 milliLiter(s) Nebulizer every 6 hours  amLODIPine   Tablet 10 milliGRAM(s) Oral daily  artificial  tears Solution 2 Drop(s) Both EYES four times a day  ascorbic acid 500 milliGRAM(s) Oral daily  calcium carbonate    500 mG (Tums) Chewable 1 Tablet(s) Chew every 12 hours  cephalexin 500 milliGRAM(s) Oral every 6 hours  chlorhexidine 0.12% Liquid 15 milliLiter(s) Oral Mucosa every 12 hours  chlorhexidine 4% Liquid 1 Application(s) Topical <User Schedule>  ciprofloxacin   IVPB 400 milliGRAM(s) IV Intermittent every 12 hours  dextrose 50% Injectable 50 milliLiter(s) IV Push once  escitalopram 10 milliGRAM(s) Oral <User Schedule>  gabapentin Solution 100 milliGRAM(s) Enteral Tube at bedtime  insulin glargine Injectable (LANTUS) 25 Unit(s) SubCutaneous every morning  insulin lispro (ADMELOG) corrective regimen sliding scale   SubCutaneous every 6 hours  levothyroxine 125 MICROGram(s) Oral <User Schedule>  lidocaine   4% Patch 1 Patch Transdermal daily  melatonin 3 milliGRAM(s) Oral at bedtime  multivitamin/minerals/iron Oral Solution (CENTRUM) 15 milliLiter(s) Oral daily  nystatin Powder 1 Application(s) Topical every 8 hours  oxybutynin 5 milliGRAM(s) Oral daily  pantoprazole   Suspension 40 milliGRAM(s) Enteral Tube <User Schedule>  petrolatum Ophthalmic Ointment 1 Application(s) Both EYES four times a day  polyethylene glycol 3350 17 Gram(s) Oral daily  predniSONE   Tablet 5 milliGRAM(s) Oral daily  QUEtiapine 12.5 milliGRAM(s) Oral <User Schedule>  senna Syrup 15 milliLiter(s) Oral at bedtime  simethicone 80 milliGRAM(s) Chew four times a day  sodium chloride 3%  Inhalation 4 milliLiter(s) Inhalation every 12 hours  triamcinolone 0.1% Ointment 1 Application(s) Topical two times a day  zinc oxide 40% Paste 1 Application(s) Topical daily    MEDICATIONS  (PRN):  acetaminophen   Oral Liquid .. 1000 milliGRAM(s) Enteral Tube every 6 hours PRN Temp greater or equal to 38C (100.4F), Mild Pain (1 - 3)  benzocaine 20% Spray 1 Spray(s) Topical four times a day PRN Cough  diphenhydrAMINE Elixir 25 milliGRAM(s) Oral every 6 hours PRN Rash and/or Itching  sodium chloride 0.65% Nasal 1 Spray(s) Both Nostrils every 6 hours PRN Nasal Congestion      LABS:                        8.7    7.47  )-----------( 136      ( 22 Nov 2023 16:23 )             28.5     11-22    132<L>  |  94<L>  |  17  ----------------------------<  296<H>  4.3   |  27  |  <0.30<L>    Ca    9.4      22 Nov 2023 16:23  Phos  4.1     11-22  Mg     1.8     11-22    TPro  7.5  /  Alb  3.4  /  TBili  0.4  /  DBili  x   /  AST  30  /  ALT  37  /  AlkPhos  62  11-22      Urinalysis Basic - ( 22 Nov 2023 16:23 )    Color: x / Appearance: x / SG: x / pH: x  Gluc: 296 mg/dL / Ketone: x  / Bili: x / Urobili: x   Blood: x / Protein: x / Nitrite: x   Leuk Esterase: x / RBC: x / WBC x   Sq Epi: x / Non Sq Epi: x / Bacteria: x          CAPILLARY BLOOD GLUCOSE    MICROBIOLOGY:     RADIOLOGY:  [ ] Reviewed and interpreted by me      Mode: AC/ CMV (Assist Control/ Continuous Mandatory Ventilation)  RR (machine): 12  TV (machine): 300  FiO2: 30  PEEP: 5  ITime: 1  MAP: 10  PIP: 27            Karl Lawson, -ACN  [id number] Patient is a 73y old  Female who presents with a chief complaint of Bleeding from tracheostomy and PEG tube (21 Nov 2023 08:15)    HPI:  74 y/o F with PMHx of T2DM, HTN, HLD, anemia, hypothyroidism, RA, fibromyalgia, remote cerebral aneurysm repair, acute hypercapnic respiratory failure now with tracheostomy, PEG tube, and bedbound (trach change 8/18, PEG change 8/14), sacral pressure ulcer, BIBEMS from home for 6 day hx of bleeding from the tracheostomy and PEG tube with associated abdominal pain. Patient still having regular bowel movements, last one occurred prior to ED arrival. Was seen outpatient on 10/26 by critical care Dr. Zaki Gottlieb and apparently had cultures sent at that time. Patient also noted to have chronic sacral decubitous ulcer. Family endorsed one episode of fever, though was not febrile on evaluation. Daughter noted patient was recently experiencing auditory and visual hallucinations (seeing animals that were not there & hearing a "bomb" going off outside her home), though patient not actively hallucinating on evaluation.    ED course notable for CT neck imaging showing no evidence of active bleeding around the tracheostomy site, no hematomas, and no drainable collection around the tracheostomy site. CT abdomen/pelvis notable for gastrostomy tube localized to the stomach with mild thickening noted along the tract; a sacral decubitus ulcer extending to the coccyx with osseous remodeling, possibly representing osteomyelitis; and bibasilar patchy opacities with volume loss in the lungs, representing atelectasis vs infection. Patient admitted for respiratory support, wound care of tracheostomy and PEG, and management of presumed sacral osteomyelitis.   (28 Oct 2023 04:18)    Interval Events: No issues overnight    REVIEW OF SYSTEMS:  [ ] Positive  [x ] All other systems negative  [ ] Unable to assess ROS because ________    Vital Signs Last 24 Hrs  T(C): 37.7 (11-23-23 @ 05:30), Max: 37.7 (11-23-23 @ 05:30)  T(F): 99.9 (11-23-23 @ 05:30), Max: 99.9 (11-23-23 @ 05:30)  HR: 91 (11-23-23 @ 05:37) (71 - 111)  BP: 157/74 (11-23-23 @ 05:30) (100/59 - 157/74)  RR: 18 (11-23-23 @ 05:30) (18 - 20)  SpO2: 99% (11-23-23 @ 05:37) (98% - 100%)    PHYSICAL EXAM:  HEENT:   [X] Tracheostomy:  #8 distal XLT Shiley  [] PERRL  [X] No oral lesions  [ ] Abnormal    SKIN  [ ] No Rash  [ ]  Abnormal  [X] pressure: Sacral wound    CARDIAC  [X] Regular  [ ] Abnormal    PULMONARY  [X] Bilateral Clear Breath Sounds  [ ] Normal Excursion  [ ] Abnormal    GI  [X] PEG      [X] +BS		              [X] Soft, nondistended, nontender	  [ ] Abnormal    MUSCULOSKELETAL                                   [  ] Bedbound                 [X] Abnormal:  Deconditioned but can partially move all limbs   [ ] Ambulatory/OOB to chair                           EXTREMITIES                                         [X] Normal  [ ]Edema                           NEUROLOGIC  [ ] Normal, non focal  [X] Focal findings:  Alert and Oriented x2; responds appropriately top questions by mouthing and able to phonate while on vent;     PSYCHIATRIC  [X] Alert; somewhat anxious at times but mostly appropriate  [ ] Sedated	 [ ]Agitated    :  Mcbride: [ ] Yes, if yes: Date of Placement:                   [X] No    LINES: Central Lines [ ] Yes, if yes: Date of Placement                                     [X] No    HOSPITAL MEDICATIONS:  MEDICATIONS  (STANDING):  albuterol/ipratropium for Nebulization 3 milliLiter(s) Nebulizer every 6 hours  amLODIPine   Tablet 10 milliGRAM(s) Oral daily  artificial  tears Solution 2 Drop(s) Both EYES four times a day  ascorbic acid 500 milliGRAM(s) Oral daily  calcium carbonate    500 mG (Tums) Chewable 1 Tablet(s) Chew every 12 hours  cephalexin 500 milliGRAM(s) Oral every 6 hours  chlorhexidine 0.12% Liquid 15 milliLiter(s) Oral Mucosa every 12 hours  chlorhexidine 4% Liquid 1 Application(s) Topical <User Schedule>  ciprofloxacin   IVPB 400 milliGRAM(s) IV Intermittent every 12 hours  dextrose 50% Injectable 50 milliLiter(s) IV Push once  escitalopram 10 milliGRAM(s) Oral <User Schedule>  gabapentin Solution 100 milliGRAM(s) Enteral Tube at bedtime  insulin glargine Injectable (LANTUS) 25 Unit(s) SubCutaneous every morning  insulin lispro (ADMELOG) corrective regimen sliding scale   SubCutaneous every 6 hours  levothyroxine 125 MICROGram(s) Oral <User Schedule>  lidocaine   4% Patch 1 Patch Transdermal daily  melatonin 3 milliGRAM(s) Oral at bedtime  multivitamin/minerals/iron Oral Solution (CENTRUM) 15 milliLiter(s) Oral daily  nystatin Powder 1 Application(s) Topical every 8 hours  oxybutynin 5 milliGRAM(s) Oral daily  pantoprazole   Suspension 40 milliGRAM(s) Enteral Tube <User Schedule>  petrolatum Ophthalmic Ointment 1 Application(s) Both EYES four times a day  polyethylene glycol 3350 17 Gram(s) Oral daily  predniSONE   Tablet 5 milliGRAM(s) Oral daily  QUEtiapine 12.5 milliGRAM(s) Oral <User Schedule>  senna Syrup 15 milliLiter(s) Oral at bedtime  simethicone 80 milliGRAM(s) Chew four times a day  sodium chloride 3%  Inhalation 4 milliLiter(s) Inhalation every 12 hours  triamcinolone 0.1% Ointment 1 Application(s) Topical two times a day  zinc oxide 40% Paste 1 Application(s) Topical daily    MEDICATIONS  (PRN):  acetaminophen   Oral Liquid .. 1000 milliGRAM(s) Enteral Tube every 6 hours PRN Temp greater or equal to 38C (100.4F), Mild Pain (1 - 3)  benzocaine 20% Spray 1 Spray(s) Topical four times a day PRN Cough  diphenhydrAMINE Elixir 25 milliGRAM(s) Oral every 6 hours PRN Rash and/or Itching  sodium chloride 0.65% Nasal 1 Spray(s) Both Nostrils every 6 hours PRN Nasal Congestion      LABS:                        8.7    7.47  )-----------( 136      ( 22 Nov 2023 16:23 )             28.5     11-22    132<L>  |  94<L>  |  17  ----------------------------<  296<H>  4.3   |  27  |  <0.30<L>    Ca    9.4      22 Nov 2023 16:23  Phos  4.1     11-22  Mg     1.8     11-22    TPro  7.5  /  Alb  3.4  /  TBili  0.4  /  DBili  x   /  AST  30  /  ALT  37  /  AlkPhos  62  11-22      Urinalysis Basic - ( 22 Nov 2023 16:23 )    Color: x / Appearance: x / SG: x / pH: x  Gluc: 296 mg/dL / Ketone: x  / Bili: x / Urobili: x   Blood: x / Protein: x / Nitrite: x   Leuk Esterase: x / RBC: x / WBC x   Sq Epi: x / Non Sq Epi: x / Bacteria: x    Mode: AC/ CMV (Assist Control/ Continuous Mandatory Ventilation)  RR (machine): 12  TV (machine): 300  FiO2: 30  PEEP: 5  ITime: 1  MAP: 10  PIP: 27

## 2023-11-23 NOTE — PROGRESS NOTE ADULT - PROBLEM SELECTOR PLAN 4
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 Multifactorial, possible the PEG has been too tight at home.  - Peg loosened by GI at beside, no leakage or further intervention required   -- recurrent RLQ abdominal pain and bleeding at the PEG site>> __   -- reevaluated by GI __ -- no bleeding at the PEG site  - no BM since 11/19-- increased dose of Senna and increased Miralax to BID   -- - Continue Simethicone, cont monitoring 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   - Bowel regimen,   - PEG Site appears macerated. Multifactorial, possible the PEG has been too tight at home.  - Peg loosened by GI at beside, no leakage or further intervention required   -- recurrent RLQ abdominal pain and bleeding at the PEG site>> __   -- reevaluated by GI __ -- no bleeding at the PEG site  - no BM since 11/19-- increased dose of Senna and increased Miralax to BID   - Continue Simethicone, cont monitoring

## 2023-11-23 NOTE — PROVIDER CONTACT NOTE (OTHER) - ACTION/TREATMENT ORDERED:
12 units of corrective insulin given,  Feeding changed to ALONZO FARMS 1.2 glucose support at 65 cc/hr x 18 hrs.

## 2023-11-23 NOTE — CONSULT NOTE ADULT - SUBJECTIVE AND OBJECTIVE BOX
Chief Complaint:  Patient is a 73y old  Female who presents with a chief complaint of Bleeding from tracheostomy and PEG tube (21 Nov 2023 08:15)      HPI: patient has been here in the hospital for close to 1 month. Dr. Sullivan was called to see patient for evaluation of thrombocytopenia. Covering today. Reviewed CBC results for admission and she has had a mild thrombocytopenia essentially the whole admission and it was present on admission. There are no records from prior to admission.  at bedside. he is not aware of her having low platelets prior. She also has anemia.     Medications:  acetaminophen   Oral Liquid .. 1000 milliGRAM(s) Enteral Tube every 6 hours PRN  albuterol/ipratropium for Nebulization 3 milliLiter(s) Nebulizer every 6 hours  amLODIPine   Tablet 10 milliGRAM(s) Oral daily  artificial  tears Solution 2 Drop(s) Both EYES four times a day  ascorbic acid 500 milliGRAM(s) Oral daily  benzocaine 20% Spray 1 Spray(s) Topical four times a day PRN  calcium carbonate    500 mG (Tums) Chewable 1 Tablet(s) Chew every 12 hours  cephalexin 500 milliGRAM(s) Oral every 6 hours  chlorhexidine 0.12% Liquid 15 milliLiter(s) Oral Mucosa every 12 hours  chlorhexidine 4% Liquid 1 Application(s) Topical <User Schedule>  ciprofloxacin   IVPB 400 milliGRAM(s) IV Intermittent every 12 hours  dextrose 50% Injectable 50 milliLiter(s) IV Push once  diphenhydrAMINE Elixir 25 milliGRAM(s) Oral every 6 hours PRN  escitalopram 10 milliGRAM(s) Oral <User Schedule>  gabapentin Solution 100 milliGRAM(s) Enteral Tube at bedtime  insulin glargine Injectable (LANTUS) 25 Unit(s) SubCutaneous every morning  insulin lispro (ADMELOG) corrective regimen sliding scale   SubCutaneous every 6 hours  levothyroxine 125 MICROGram(s) Oral <User Schedule>  lidocaine   4% Patch 1 Patch Transdermal daily  melatonin 3 milliGRAM(s) Oral at bedtime  multivitamin/minerals/iron Oral Solution (CENTRUM) 15 milliLiter(s) Oral daily  nystatin Powder 1 Application(s) Topical every 8 hours  oxybutynin 5 milliGRAM(s) Oral daily  pantoprazole   Suspension 40 milliGRAM(s) Enteral Tube <User Schedule>  petrolatum Ophthalmic Ointment 1 Application(s) Both EYES four times a day  polyethylene glycol 3350 17 Gram(s) Oral daily  predniSONE   Tablet 5 milliGRAM(s) Oral daily  QUEtiapine 12.5 milliGRAM(s) Oral <User Schedule>  senna Syrup 15 milliLiter(s) Oral at bedtime  simethicone 80 milliGRAM(s) Chew four times a day  sodium chloride 0.65% Nasal 1 Spray(s) Both Nostrils every 6 hours PRN  sodium chloride 3%  Inhalation 4 milliLiter(s) Inhalation every 12 hours  triamcinolone 0.1% Ointment 1 Application(s) Topical two times a day  zinc oxide 40% Paste 1 Application(s) Topical daily        Allergies:  penicillin (Unknown)  heparin (Unknown)  Tagamet (Unknown)  pineapple (Unknown)  walnut (Unknown)  Pecan, Filbert, Hazelnut (Unknown)  Lyrica (Unknown)    PAST MEDICAL & SURGICAL HISTORY:  Diabetes      Rheumatoid arthritis      Fibromyalgia      Hypothyroid      Hypertension      H/O tracheostomy      PEG (percutaneous endoscopic gastrostomy) status    REVIEW OF SYSTEMS  unable due to pt factors      Vitals:  Vital Signs Last 24 Hrs  T(C): 37.1 (23 Nov 2023 12:02), Max: 37.8 (23 Nov 2023 09:48)  T(F): 98.7 (23 Nov 2023 12:02), Max: 100 (23 Nov 2023 09:48)  HR: 95 (23 Nov 2023 12:02) (77 - 111)  BP: 118/83 (23 Nov 2023 12:02) (109/67 - 157/74)  BP(mean): --  RR: 20 (23 Nov 2023 12:02) (18 - 26)  SpO2: 99% (23 Nov 2023 12:02) (98% - 100%)    Parameters below as of 23 Nov 2023 12:02  Patient On (Oxygen Delivery Method): ventilator        Pex:  alert   EOMI anicteric sclera  Neck trach  Cv s1 S2 RRR  Lungs clear B/L anteriorly  abd soft PEG  No LE edema    Labs:                        8.7    7.47  )-----------( 136      ( 22 Nov 2023 16:23 )             28.5     CBC Full  -  ( 22 Nov 2023 16:23 )  WBC Count : 7.47 K/uL  RBC Count : 3.10 M/uL  Hemoglobin : 8.7 g/dL  Hematocrit : 28.5 %  Platelet Count - Automated : 136 K/uL  Mean Cell Volume : 91.9 fl  Mean Cell Hemoglobin : 28.1 pg  Mean Cell Hemoglobin Concentration : 30.5 gm/dL  Auto Neutrophil # : x  Auto Lymphocyte # : x  Auto Monocyte # : x  Auto Eosinophil # : x  Auto Basophil # : x  Auto Neutrophil % : x  Auto Lymphocyte % : x  Auto Monocyte % : x  Auto Eosinophil % : x  Auto Basophil % : x    11-23    131<L>  |  92<L>  |  17  ----------------------------<  306<H>  4.1   |  27  |  <0.30<L>    Ca    8.9      23 Nov 2023 07:02  Phos  3.2     11-23  Mg     1.6     11-23    TPro  7.5  /  Alb  3.4  /  TBili  0.4  /  DBili  x   /  AST  30  /  ALT  37  /  AlkPhos  62  11-22      [id number]

## 2023-11-24 DIAGNOSIS — E27.49 OTHER ADRENOCORTICAL INSUFFICIENCY: ICD-10-CM

## 2023-11-24 DIAGNOSIS — R73.9 HYPERGLYCEMIA, UNSPECIFIED: ICD-10-CM

## 2023-11-24 DIAGNOSIS — Z78.9 OTHER SPECIFIED HEALTH STATUS: ICD-10-CM

## 2023-11-24 DIAGNOSIS — E03.9 HYPOTHYROIDISM, UNSPECIFIED: ICD-10-CM

## 2023-11-24 LAB
-  AZTREONAM: SIGNIFICANT CHANGE UP
-  AZTREONAM: SIGNIFICANT CHANGE UP
-  CEFEPIME: SIGNIFICANT CHANGE UP
-  CEFEPIME: SIGNIFICANT CHANGE UP
-  CEFTAZIDIME/AVIBACTAM: SIGNIFICANT CHANGE UP
-  CEFTAZIDIME/AVIBACTAM: SIGNIFICANT CHANGE UP
-  CEFTAZIDIME: SIGNIFICANT CHANGE UP
-  CEFTAZIDIME: SIGNIFICANT CHANGE UP
-  CEFTOLOZANE/TAZOBACTAM: SIGNIFICANT CHANGE UP
-  CEFTOLOZANE/TAZOBACTAM: SIGNIFICANT CHANGE UP
-  CIPROFLOXACIN: SIGNIFICANT CHANGE UP
-  CIPROFLOXACIN: SIGNIFICANT CHANGE UP
-  IMIPENEM: SIGNIFICANT CHANGE UP
-  IMIPENEM: SIGNIFICANT CHANGE UP
-  LEVOFLOXACIN: SIGNIFICANT CHANGE UP
-  LEVOFLOXACIN: SIGNIFICANT CHANGE UP
-  MEROPENEM: SIGNIFICANT CHANGE UP
-  MEROPENEM: SIGNIFICANT CHANGE UP
-  PIPERACILLIN/TAZOBACTAM: SIGNIFICANT CHANGE UP
-  PIPERACILLIN/TAZOBACTAM: SIGNIFICANT CHANGE UP
BLANDM BLD POS QL PROBE: SIGNIFICANT CHANGE UP
BLANDM BLD POS QL PROBE: SIGNIFICANT CHANGE UP
CULTURE RESULTS: ABNORMAL
CULTURE RESULTS: ABNORMAL
FERRITIN SERPL-MCNC: 1114 NG/ML — HIGH (ref 13–330)
FERRITIN SERPL-MCNC: 1114 NG/ML — HIGH (ref 13–330)
FOLATE SERPL-MCNC: 17.3 NG/ML — SIGNIFICANT CHANGE UP
FOLATE SERPL-MCNC: 17.3 NG/ML — SIGNIFICANT CHANGE UP
GLUCOSE BLDC GLUCOMTR-MCNC: 213 MG/DL — HIGH (ref 70–99)
GLUCOSE BLDC GLUCOMTR-MCNC: 213 MG/DL — HIGH (ref 70–99)
GLUCOSE BLDC GLUCOMTR-MCNC: 221 MG/DL — HIGH (ref 70–99)
GLUCOSE BLDC GLUCOMTR-MCNC: 221 MG/DL — HIGH (ref 70–99)
GLUCOSE BLDC GLUCOMTR-MCNC: 226 MG/DL — HIGH (ref 70–99)
GLUCOSE BLDC GLUCOMTR-MCNC: 226 MG/DL — HIGH (ref 70–99)
GLUCOSE BLDC GLUCOMTR-MCNC: 267 MG/DL — HIGH (ref 70–99)
GLUCOSE BLDC GLUCOMTR-MCNC: 267 MG/DL — HIGH (ref 70–99)
GLUCOSE BLDC GLUCOMTR-MCNC: 397 MG/DL — HIGH (ref 70–99)
GLUCOSE BLDC GLUCOMTR-MCNC: 397 MG/DL — HIGH (ref 70–99)
HAPTOGLOB SERPL-MCNC: 282 MG/DL — HIGH (ref 34–200)
HAPTOGLOB SERPL-MCNC: 282 MG/DL — HIGH (ref 34–200)
HEPARIN-PF4 AB RESULT: <0.6 U/ML — SIGNIFICANT CHANGE UP (ref 0–0.9)
HEPARIN-PF4 AB RESULT: <0.6 U/ML — SIGNIFICANT CHANGE UP (ref 0–0.9)
IRON SATN MFR SERPL: 37 UG/DL — SIGNIFICANT CHANGE UP (ref 30–160)
IRON SATN MFR SERPL: 37 UG/DL — SIGNIFICANT CHANGE UP (ref 30–160)
LDH SERPL L TO P-CCNC: 215 U/L — SIGNIFICANT CHANGE UP (ref 50–242)
LDH SERPL L TO P-CCNC: 215 U/L — SIGNIFICANT CHANGE UP (ref 50–242)
METHOD TYPE: SIGNIFICANT CHANGE UP
ORGANISM # SPEC MICROSCOPIC CNT: ABNORMAL
PF4 HEPARIN CMPLX AB SER-ACNC: NEGATIVE — SIGNIFICANT CHANGE UP
PF4 HEPARIN CMPLX AB SER-ACNC: NEGATIVE — SIGNIFICANT CHANGE UP
SPECIMEN SOURCE: SIGNIFICANT CHANGE UP
SPECIMEN SOURCE: SIGNIFICANT CHANGE UP
VIT B12 SERPL-MCNC: 1026 PG/ML — SIGNIFICANT CHANGE UP (ref 232–1245)
VIT B12 SERPL-MCNC: 1026 PG/ML — SIGNIFICANT CHANGE UP (ref 232–1245)

## 2023-11-24 PROCEDURE — 99223 1ST HOSP IP/OBS HIGH 75: CPT

## 2023-11-24 PROCEDURE — 99233 SBSQ HOSP IP/OBS HIGH 50: CPT

## 2023-11-24 RX ORDER — GUAIFENESIN/DEXTROMETHORPHAN 600MG-30MG
10 TABLET, EXTENDED RELEASE 12 HR ORAL EVERY 6 HOURS
Refills: 0 | Status: DISCONTINUED | OUTPATIENT
Start: 2023-11-24 | End: 2023-11-25

## 2023-11-24 RX ORDER — POLYETHYLENE GLYCOL 3350 17 G/17G
17 POWDER, FOR SOLUTION ORAL
Refills: 0 | Status: DISCONTINUED | OUTPATIENT
Start: 2023-11-26 | End: 2023-11-27

## 2023-11-24 RX ORDER — CIPROFLOXACIN LACTATE 400MG/40ML
400 VIAL (ML) INTRAVENOUS EVERY 12 HOURS
Refills: 0 | Status: DISCONTINUED | OUTPATIENT
Start: 2023-11-24 | End: 2023-12-11

## 2023-11-24 RX ORDER — DEXTROSE 50 % IN WATER 50 %
12.5 SYRINGE (ML) INTRAVENOUS ONCE
Refills: 0 | Status: DISCONTINUED | OUTPATIENT
Start: 2023-11-24 | End: 2024-01-17

## 2023-11-24 RX ORDER — DEXTROSE 50 % IN WATER 50 %
25 SYRINGE (ML) INTRAVENOUS ONCE
Refills: 0 | Status: DISCONTINUED | OUTPATIENT
Start: 2023-11-24 | End: 2023-12-08

## 2023-11-24 RX ORDER — INSULIN HUMAN 100 [IU]/ML
13 INJECTION, SOLUTION SUBCUTANEOUS
Refills: 0 | Status: DISCONTINUED | OUTPATIENT
Start: 2023-11-24 | End: 2023-11-25

## 2023-11-24 RX ORDER — DEXTROSE 50 % IN WATER 50 %
15 SYRINGE (ML) INTRAVENOUS ONCE
Refills: 0 | Status: DISCONTINUED | OUTPATIENT
Start: 2023-11-24 | End: 2023-12-08

## 2023-11-24 RX ORDER — GLUCAGON INJECTION, SOLUTION 0.5 MG/.1ML
1 INJECTION, SOLUTION SUBCUTANEOUS ONCE
Refills: 0 | Status: DISCONTINUED | OUTPATIENT
Start: 2023-11-24 | End: 2024-01-17

## 2023-11-24 RX ORDER — INSULIN GLARGINE 100 [IU]/ML
30 INJECTION, SOLUTION SUBCUTANEOUS EVERY MORNING
Refills: 0 | Status: DISCONTINUED | OUTPATIENT
Start: 2023-11-25 | End: 2023-11-28

## 2023-11-24 RX ORDER — DEXTROSE 50 % IN WATER 50 %
25 SYRINGE (ML) INTRAVENOUS ONCE
Refills: 0 | Status: DISCONTINUED | OUTPATIENT
Start: 2023-11-24 | End: 2024-01-17

## 2023-11-24 RX ADMIN — INSULIN HUMAN 13 UNIT(S): 100 INJECTION, SOLUTION SUBCUTANEOUS at 18:56

## 2023-11-24 RX ADMIN — Medication 1 TABLET(S): at 05:19

## 2023-11-24 RX ADMIN — Medication 10 MILLILITER(S): at 23:37

## 2023-11-24 RX ADMIN — Medication 500 MILLIGRAM(S): at 12:57

## 2023-11-24 RX ADMIN — SIMETHICONE 80 MILLIGRAM(S): 80 TABLET, CHEWABLE ORAL at 23:31

## 2023-11-24 RX ADMIN — Medication 1 APPLICATION(S): at 05:20

## 2023-11-24 RX ADMIN — INSULIN GLARGINE 25 UNIT(S): 100 INJECTION, SOLUTION SUBCUTANEOUS at 08:42

## 2023-11-24 RX ADMIN — PANTOPRAZOLE SODIUM 40 MILLIGRAM(S): 20 TABLET, DELAYED RELEASE ORAL at 05:24

## 2023-11-24 RX ADMIN — CHLORHEXIDINE GLUCONATE 15 MILLILITER(S): 213 SOLUTION TOPICAL at 05:20

## 2023-11-24 RX ADMIN — SODIUM CHLORIDE 4 MILLILITER(S): 9 INJECTION INTRAMUSCULAR; INTRAVENOUS; SUBCUTANEOUS at 05:45

## 2023-11-24 RX ADMIN — Medication 5 MILLIGRAM(S): at 12:57

## 2023-11-24 RX ADMIN — Medication 3 MILLIGRAM(S): at 22:59

## 2023-11-24 RX ADMIN — ESCITALOPRAM OXALATE 10 MILLIGRAM(S): 10 TABLET, FILM COATED ORAL at 08:42

## 2023-11-24 RX ADMIN — Medication 1 APPLICATION(S): at 05:21

## 2023-11-24 RX ADMIN — NYSTATIN CREAM 1 APPLICATION(S): 100000 CREAM TOPICAL at 22:59

## 2023-11-24 RX ADMIN — Medication 4: at 18:32

## 2023-11-24 RX ADMIN — Medication 1 APPLICATION(S): at 18:23

## 2023-11-24 RX ADMIN — Medication 2 DROP(S): at 18:24

## 2023-11-24 RX ADMIN — NYSTATIN CREAM 1 APPLICATION(S): 100000 CREAM TOPICAL at 14:35

## 2023-11-24 RX ADMIN — Medication 2 DROP(S): at 12:58

## 2023-11-24 RX ADMIN — LIDOCAINE 1 PATCH: 4 CREAM TOPICAL at 12:57

## 2023-11-24 RX ADMIN — Medication 10 MILLILITER(S): at 15:40

## 2023-11-24 RX ADMIN — Medication 500 MILLIGRAM(S): at 18:24

## 2023-11-24 RX ADMIN — Medication 1 APPLICATION(S): at 18:44

## 2023-11-24 RX ADMIN — Medication 2 DROP(S): at 05:21

## 2023-11-24 RX ADMIN — Medication 500 MILLIGRAM(S): at 23:32

## 2023-11-24 RX ADMIN — Medication 1 TABLET(S): at 18:23

## 2023-11-24 RX ADMIN — Medication 4: at 23:30

## 2023-11-24 RX ADMIN — LIDOCAINE 1 PATCH: 4 CREAM TOPICAL at 20:05

## 2023-11-24 RX ADMIN — Medication 3 MILLILITER(S): at 05:45

## 2023-11-24 RX ADMIN — Medication 10: at 12:55

## 2023-11-24 RX ADMIN — QUETIAPINE FUMARATE 12.5 MILLIGRAM(S): 200 TABLET, FILM COATED ORAL at 18:32

## 2023-11-24 RX ADMIN — NYSTATIN CREAM 1 APPLICATION(S): 100000 CREAM TOPICAL at 05:21

## 2023-11-24 RX ADMIN — Medication 1 APPLICATION(S): at 12:58

## 2023-11-24 RX ADMIN — SODIUM CHLORIDE 4 MILLILITER(S): 9 INJECTION INTRAMUSCULAR; INTRAVENOUS; SUBCUTANEOUS at 17:16

## 2023-11-24 RX ADMIN — CHLORHEXIDINE GLUCONATE 1 APPLICATION(S): 213 SOLUTION TOPICAL at 05:21

## 2023-11-24 RX ADMIN — Medication 4: at 05:48

## 2023-11-24 RX ADMIN — Medication 3 MILLILITER(S): at 11:40

## 2023-11-24 RX ADMIN — Medication 10 MILLILITER(S): at 18:24

## 2023-11-24 RX ADMIN — Medication 125 MICROGRAM(S): at 05:19

## 2023-11-24 RX ADMIN — Medication 200 MILLIGRAM(S): at 05:19

## 2023-11-24 RX ADMIN — ZINC OXIDE 1 APPLICATION(S): 200 OINTMENT TOPICAL at 13:36

## 2023-11-24 RX ADMIN — Medication 5 MILLIGRAM(S): at 05:19

## 2023-11-24 RX ADMIN — SIMETHICONE 80 MILLIGRAM(S): 80 TABLET, CHEWABLE ORAL at 12:56

## 2023-11-24 RX ADMIN — SIMETHICONE 80 MILLIGRAM(S): 80 TABLET, CHEWABLE ORAL at 18:23

## 2023-11-24 RX ADMIN — Medication 2 DROP(S): at 23:33

## 2023-11-24 RX ADMIN — GABAPENTIN 100 MILLIGRAM(S): 400 CAPSULE ORAL at 22:58

## 2023-11-24 RX ADMIN — SIMETHICONE 80 MILLIGRAM(S): 80 TABLET, CHEWABLE ORAL at 05:20

## 2023-11-24 RX ADMIN — CHLORHEXIDINE GLUCONATE 15 MILLILITER(S): 213 SOLUTION TOPICAL at 18:23

## 2023-11-24 RX ADMIN — SENNA PLUS 15 MILLILITER(S): 8.6 TABLET ORAL at 22:58

## 2023-11-24 RX ADMIN — AMLODIPINE BESYLATE 10 MILLIGRAM(S): 2.5 TABLET ORAL at 05:19

## 2023-11-24 RX ADMIN — Medication 1 APPLICATION(S): at 23:31

## 2023-11-24 RX ADMIN — Medication 3 MILLILITER(S): at 17:17

## 2023-11-24 RX ADMIN — Medication 200 MILLIGRAM(S): at 18:22

## 2023-11-24 RX ADMIN — Medication 500 MILLIGRAM(S): at 05:20

## 2023-11-24 RX ADMIN — Medication 15 MILLILITER(S): at 12:57

## 2023-11-24 NOTE — PROGRESS NOTE ADULT - ASSESSMENT
74 y/o F with PMHx of T2DM, HTN, HLD, anemia, hypothyroidism, RA, fibromyalgia, remote cerebral aneurysm repair, acute hypercapnic respiratory failure now with tracheostomy, PEG tube, and bedbound (trach change 8/18, PEG change 8/14), sacral pressure ulcer, BIBEMS from home for 6 day hx of bleeding from the tracheostomy and PEG tube with associated abdominal pain, recent history of auditory and visual hallucinations, and with chronic sacral decubitus ulcer. Exam notable for mild abdominal distention with LLQ and RLQ tenderness, tracheostomy in place with no obvious bleeding, PEG tube with scant amount of dried blood, at baseline neurologic status. Imaging showing no active bleeding around tracheostomy site, however was notable for mild thickening along PEG tube tract, sacral ulcer extending to the coccyx suggestive of possible osteomyelitis, and bibasilar patchy lung opacities with volume loss. Patient admitted for respiratory support, wound care of tracheostomy and PEG, and management of sacral osteomyelitis. No further Trach and peg site  bleeding noted since admission.  Pelvic MRI imaging also suggestive of Acute OM. Patient placed on long-term antibiotics with Infectious disease guidance.  Additionally treated with a 7d course of Cefepime due to PSA and Serratia tracheitis on 11/8-->11/15 and then resumed cipro/keflex.  Hospital course c/b Delirium for which Seroquel was added and home Lexapro continued. Tracheostomy changed to #9 Cuffed Portex by ENT 11/3.  Despite trach change patient remained with persistent air leak.  On 11/19, the trach was again changed due to leak and loss of volumes on vent.  Trach was changed to #8 distal cuffed Shiley.  Patient seen by Dermatology for back lesions.  One diagnosed as a seborrheic keratitis and the other a dermatofibroma.     11/23: Cultures NGTD.  No changes to antibiotic regimen made at this time.  Endocrine consulted for DM management. Enteral feeding changed to IntelePeer diabetic formula. Heme consulted for anemia and thrombocytopenia, follow-up labs.       74 y/o F with PMHx of T2DM, HTN, HLD, anemia, hypothyroidism, RA, fibromyalgia, remote cerebral aneurysm repair, acute hypercapnic respiratory failure now with tracheostomy, PEG tube, and bedbound (trach change 8/18, PEG change 8/14), sacral pressure ulcer, BIBEMS from home for 6 day hx of bleeding from the tracheostomy and PEG tube with associated abdominal pain, recent history of auditory and visual hallucinations, and with chronic sacral decubitus ulcer. Exam notable for mild abdominal distention with LLQ and RLQ tenderness, tracheostomy in place with no obvious bleeding, PEG tube with scant amount of dried blood, at baseline neurologic status. Imaging showing no active bleeding around tracheostomy site, however was notable for mild thickening along PEG tube tract, sacral ulcer extending to the coccyx suggestive of possible osteomyelitis, and bibasilar patchy lung opacities with volume loss. Patient admitted for respiratory support, wound care of tracheostomy and PEG, and management of sacral osteomyelitis. No further Trach and peg site  bleeding noted since admission.  Pelvic MRI imaging also suggestive of Acute OM. Patient placed on long-term antibiotics with Infectious disease guidance.  Additionally treated with a 7d course of Cefepime due to PSA and Serratia tracheitis on 11/8-->11/15 and then resumed cipro/keflex.  Hospital course c/b Delirium for which Seroquel was added and home Lexapro continued. Tracheostomy changed to #9 Cuffed Portex by ENT 11/3.  Despite trach change patient remained with persistent air leak.  On 11/19, the trach was again changed due to leak and loss of volumes on vent.  Trach was changed to #8 distal cuffed Shiley.  Patient seen by Dermatology for back lesions.  One diagnosed as a seborrheic keratitis and the other a dermatofibroma.     11/24:  Afebrile over night.   FS remain elevated despite change in diet. Awaiting endocrine recommendations.  Dietician had discussion with patient's daughter.

## 2023-11-24 NOTE — PROGRESS NOTE ADULT - PROBLEM SELECTOR PLAN 3
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  11/18 RR increased to 14 due to hypercarbia from trach collar trial  - Tolerates intermittent PSV 15/5 during day/ Vent HS  - Airway Clearance: Duoneb, Hypersal, Chest PT, PRN suctioning   - Maintain 02 sat > 92%  Tracheal Bleeding (Intermittent)-->resolved since admission   -S/p CT Angio neck: No evidence of active bleeding around tracheostomy site. No hematoma.  No collection   - Seen by ENT; Kath and b/l bronchi visualized without any trauma. small amount of blood tinged secretions resting at beginning of right bronchus not actively bleeding.  - 11/3 trach changed to #9 Portex by ENT  - 11/17:  Due to persistent air leak and volume loss on vent, trach again changed by ENT to #8 distal cuffed Shiley.  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  11/18 RR increased to 14 due to hypercarbia from trach collar trial  - Tolerates intermittent PSV 15/5 during day/ Vent HS  - Airway Clearance: Duoneb, Hypersal, Chest PT, PRN suctioning   - Maintain 02 sat > 92%  Tracheal Bleeding (Intermittent)-->resolved since admission   -S/p CT Angio neck: No evidence of active bleeding around tracheostomy site. No hematoma.  No collection   - Seen by ENT; Kath and b/l bronchi visualized without any trauma. small amount of blood tinged secretions resting at beginning of right bronchus not actively bleeding.  - 11/3 trach changed to #9 Portex by ENT  - 11/17:  Due to persistent air leak and volume loss on vent, trach again changed by ENT to #8 distal XLT cuffed Shiley.  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

## 2023-11-24 NOTE — CONSULT NOTE ADULT - ASSESSMENT
74 y/o F with PMHx of T2DM, HTN, HLD, anemia, hypothyroidism, RA, fibromyalgia, remote cerebral aneurysm repair, acute hypercapnic respiratory failure now with tracheostomy, PEG tube, and bedbound (trach change 8/18, PEG change 8/14), sacral pressure ulcer, BIBEMS from home for 6 day hx of bleeding from the tracheostomy and PEG tube with associated abdominal pain. Endocrinology consulted for hyperglycemia    #DM2 - A1c 7.5 10/2023  #tube feeds  #stress hyperglycemia  -s/p lantus 25 units nightly  -start NPH to be dosed at 6AM, 12PM, and 6PM  -FSG q6 hours  -moderate admelog correction scale every 6 hours  -FSG goal 140-180 while on tube feeds  -please ensure no antibiotics are being given in dextrose and if so, please switch them to NS if possible    #discharge  -at least basal insulin, dose to be determined    #Hypothyroidism  -c/w levothyroxine 125mcg daily  -please repeat TSH and free thyroxine     #chronic steroids and risk of tertiary AI  -c/w prednisone 5mg daily  -sick day rules discussed with patient's daughter  -BP stable and has been having hyperglycemia, thus would not increase dose of prednisone at this time    Case discussed with Dr. Carlos Crook MD  Endocrine Fellow  Can be reached via teams. For follow up questions, discharge recommendations, or new consults, please call answering service at [phone] (weekdays); [phone] (nights/weekends) 74 y/o F with PMHx of T2DM, HTN, HLD, anemia, hypothyroidism, RA, fibromyalgia, remote cerebral aneurysm repair, acute hypercapnic respiratory failure now with tracheostomy, PEG tube, and bedbound (trach change 8/18, PEG change 8/14), sacral pressure ulcer, BIBEMS from home for 6 day hx of bleeding from the tracheostomy and PEG tube with associated abdominal pain. Endocrinology consulted for hyperglycemia    #DM2 - A1c 7.5 10/2023  #tube feeds  #stress hyperglycemia  -s/p lantus 25 units daily   -increase lantus to 30 units daily  -start 13 units regular to be dosed at 6AM, 12PM, and 6PM  -FSG q6 hours  -moderate admelog correction scale every 6 hours  -FSG goal 140-180 while on tube feeds  -please ensure no antibiotics are being given in dextrose and if so, please switch them to NS if possible    #discharge  -at least basal insulin, dose to be determined    #Hypothyroidism  -c/w levothyroxine 125mcg daily  -please repeat TSH and free thyroxine     #chronic steroids and risk of tertiary AI  -c/w prednisone 5mg daily  -sick day rules discussed with patient's daughter  -BP stable and has been having hyperglycemia, thus would not increase dose of prednisone at this time  -please check 25 hydroxyvitamind    Case discussed with Dr. Carlos Crook MD  Endocrine Fellow  Can be reached via teams. For follow up questions, discharge recommendations, or new consults, please call answering service at [phone] (weekdays); [phone] (nights/weekends)

## 2023-11-24 NOTE — PROGRESS NOTE ADULT - SUBJECTIVE AND OBJECTIVE BOX
Patient is a 73y old  Female who presents with a chief complaint of Bleeding from tracheostomy and PEG tube (21 Nov 2023 08:15)      Interval Events:    REVIEW OF SYSTEMS:  [ ] Positive  [ ] All other systems negative  [ ] Unable to assess ROS because ________    Vital Signs Last 24 Hrs  T(C): 37.6 (11-24-23 @ 03:27), Max: 37.8 (11-23-23 @ 09:48)  T(F): 99.7 (11-24-23 @ 03:27), Max: 100 (11-23-23 @ 09:48)  HR: 90 (11-24-23 @ 05:45) (82 - 107)  BP: 152/77 (11-24-23 @ 03:27) (114/51 - 152/77)  RR: 17 (11-24-23 @ 03:27) (15 - 26)  SpO2: 100% (11-24-23 @ 05:45) (98% - 100%)    PHYSICAL EXAM:  HEENT:   [ ]Tracheostomy:  [ ]Pupils equal  [ ]No oral lesions  [ ]Abnormal    SKIN  [ ]No Rash  [ ] Abnormal  [ ] pressure    CARDIAC  [ ]Regular  [ ]Abnormal    PULMONARY  [ ]Bilateral Clear Breath Sounds  [ ]Normal Excursion  [ ]Abnormal    GI  [ ]PEG      [ ] +BS		              [ ]Soft, nondistended, nontender	  [ ]Abnormal    MUSCULOSKELETAL                                   [ ]Bedbound                 [ ]Abnormal    [ ]Ambulatory/OOB to chair                           EXTREMITIES                                         [ ]Normal  [ ]Edema                           NEUROLOGIC  [ ] Normal, non focal  [ ] Focal findings:    PSYCHIATRIC  [ ]Alert and appropriate  [ ] Sedated	 [ ]Agitated    :  Mcbride: [ ] Yes, if yes: Date of Placement:                   [  ] No    LINES: Central Lines [ ] Yes, if yes: Date of Placement                                     [  ] No    HOSPITAL MEDICATIONS:  MEDICATIONS  (STANDING):  albuterol/ipratropium for Nebulization 3 milliLiter(s) Nebulizer every 6 hours  amLODIPine   Tablet 10 milliGRAM(s) Oral daily  artificial  tears Solution 2 Drop(s) Both EYES four times a day  ascorbic acid 500 milliGRAM(s) Oral daily  calcium carbonate    500 mG (Tums) Chewable 1 Tablet(s) Chew every 12 hours  cephalexin 500 milliGRAM(s) Oral every 6 hours  chlorhexidine 0.12% Liquid 15 milliLiter(s) Oral Mucosa every 12 hours  chlorhexidine 4% Liquid 1 Application(s) Topical <User Schedule>  ciprofloxacin   IVPB 400 milliGRAM(s) IV Intermittent every 12 hours  dextrose 50% Injectable 50 milliLiter(s) IV Push once  escitalopram 10 milliGRAM(s) Oral <User Schedule>  gabapentin Solution 100 milliGRAM(s) Enteral Tube at bedtime  insulin glargine Injectable (LANTUS) 25 Unit(s) SubCutaneous every morning  insulin lispro (ADMELOG) corrective regimen sliding scale   SubCutaneous every 6 hours  levothyroxine 125 MICROGram(s) Oral <User Schedule>  lidocaine   4% Patch 1 Patch Transdermal daily  melatonin 3 milliGRAM(s) Oral at bedtime  multivitamin/minerals/iron Oral Solution (CENTRUM) 15 milliLiter(s) Oral daily  nystatin Powder 1 Application(s) Topical every 8 hours  oxybutynin 5 milliGRAM(s) Oral daily  pantoprazole   Suspension 40 milliGRAM(s) Enteral Tube <User Schedule>  petrolatum Ophthalmic Ointment 1 Application(s) Both EYES four times a day  polyethylene glycol 3350 17 Gram(s) Oral daily  predniSONE   Tablet 5 milliGRAM(s) Oral daily  QUEtiapine 12.5 milliGRAM(s) Oral <User Schedule>  senna Syrup 15 milliLiter(s) Oral at bedtime  simethicone 80 milliGRAM(s) Chew four times a day  sodium chloride 3%  Inhalation 4 milliLiter(s) Inhalation every 12 hours  triamcinolone 0.1% Ointment 1 Application(s) Topical two times a day  zinc oxide 40% Paste 1 Application(s) Topical daily    MEDICATIONS  (PRN):  acetaminophen   Oral Liquid .. 1000 milliGRAM(s) Enteral Tube every 6 hours PRN Temp greater or equal to 38C (100.4F), Mild Pain (1 - 3)  benzocaine 20% Spray 1 Spray(s) Topical four times a day PRN Cough  diphenhydrAMINE Elixir 25 milliGRAM(s) Oral every 6 hours PRN Rash and/or Itching  sodium chloride 0.65% Nasal 1 Spray(s) Both Nostrils every 6 hours PRN Nasal Congestion      LABS:                        8.7    7.47  )-----------( 136      ( 22 Nov 2023 16:23 )             28.5     11-23    131<L>  |  92<L>  |  17  ----------------------------<  306<H>  4.1   |  27  |  <0.30<L>    Ca    8.9      23 Nov 2023 07:02  Phos  3.2     11-23  Mg     1.6     11-23    TPro  7.5  /  Alb  3.4  /  TBili  0.4  /  DBili  x   /  AST  30  /  ALT  37  /  AlkPhos  62  11-22      Urinalysis Basic - ( 23 Nov 2023 07:02 )    Color: x / Appearance: x / SG: x / pH: x  Gluc: 306 mg/dL / Ketone: x  / Bili: x / Urobili: x   Blood: x / Protein: x / Nitrite: x   Leuk Esterase: x / RBC: x / WBC x   Sq Epi: x / Non Sq Epi: x / Bacteria: x          CAPILLARY BLOOD GLUCOSE    MICROBIOLOGY:     RADIOLOGY:  [ ] Reviewed and interpreted by me    Mode: AC/ CMV (Assist Control/ Continuous Mandatory Ventilation)  RR (machine): 12  TV (machine): 300  FiO2: 30  PEEP: 5  ITime: 1  MAP: 10  PIP: 29   Patient is a 73y old  Female who presents with a chief complaint of Bleeding from tracheostomy and PEG tube (21 Nov 2023 08:15)      Interval Events: No events over night.     REVIEW OF SYSTEMS:  [X] Positive:  coughing, nausea (no vomiting)  [ ] All other systems negative  [ ] Unable to assess ROS because __     Vital Signs Last 24 Hrs  T(C): 37.6 (11-24-23 @ 03:27), Max: 37.8 (11-23-23 @ 09:48)  T(F): 99.7 (11-24-23 @ 03:27), Max: 100 (11-23-23 @ 09:48)  HR: 90 (11-24-23 @ 05:45) (82 - 107)  BP: 152/77 (11-24-23 @ 03:27) (114/51 - 152/77)  RR: 17 (11-24-23 @ 03:27) (15 - 26)  SpO2: 100% (11-24-23 @ 05:45) (98% - 100%)      PHYSICAL EXAM:  HEENT:   [X] Tracheostomy:  #8 distal XLT Shiley  [X] 4mm PERRL B/L  [X] No oral lesions  [ ] Abnormal    SKIN  [ ] No Rash  [ ]  Abnormal  [X] pressure: Sacral wound    CARDIAC  [X] Regular  [ ] Abnormal    PULMONARY  [X] Bilateral Clear Breath Sounds  [ ] Normal Excursion  [ ] Abnormal    GI  [X] PEG      [X] +BS		              [X] Soft, nondistended, nontender	  [ ] Abnormal    MUSCULOSKELETAL                                   [  ] Bedbound                 [X] Abnormal:  Deconditioned but can partially move all limbs   [ ] Ambulatory/OOB to chair                           EXTREMITIES                                         [X] Normal  [ ]Edema                           NEUROLOGIC  [ ] Normal, non focal  [X] Focal findings:  Alert and Oriented x2; responds appropriately top questions by mouthing and able to phonate while on vent; B/L foot drop with partial ability to dorsiflex; moves both arms with minimal hand dexterity B/L    PSYCHIATRIC  [X] Alert; somewhat anxious at times but mostly appropriate  [ ] Sedated	 [ ]Agitated    :  Mcbride: [ ] Yes, if yes: Date of Placement:                   [X] No    LINES: Central Lines [ ] Yes, if yes: Date of Placement                                     [X] No      HOSPITAL MEDICATIONS:  MEDICATIONS  (STANDING):  albuterol/ipratropium for Nebulization 3 milliLiter(s) Nebulizer every 6 hours  amLODIPine   Tablet 10 milliGRAM(s) Oral daily  artificial  tears Solution 2 Drop(s) Both EYES four times a day  ascorbic acid 500 milliGRAM(s) Oral daily  calcium carbonate    500 mG (Tums) Chewable 1 Tablet(s) Chew every 12 hours  cephalexin 500 milliGRAM(s) Oral every 6 hours  chlorhexidine 0.12% Liquid 15 milliLiter(s) Oral Mucosa every 12 hours  chlorhexidine 4% Liquid 1 Application(s) Topical <User Schedule>  ciprofloxacin   IVPB 400 milliGRAM(s) IV Intermittent every 12 hours  dextrose 50% Injectable 50 milliLiter(s) IV Push once  escitalopram 10 milliGRAM(s) Oral <User Schedule>  gabapentin Solution 100 milliGRAM(s) Enteral Tube at bedtime  insulin glargine Injectable (LANTUS) 25 Unit(s) SubCutaneous every morning  insulin lispro (ADMELOG) corrective regimen sliding scale   SubCutaneous every 6 hours  levothyroxine 125 MICROGram(s) Oral <User Schedule>  lidocaine   4% Patch 1 Patch Transdermal daily  melatonin 3 milliGRAM(s) Oral at bedtime  multivitamin/minerals/iron Oral Solution (CENTRUM) 15 milliLiter(s) Oral daily  nystatin Powder 1 Application(s) Topical every 8 hours  oxybutynin 5 milliGRAM(s) Oral daily  pantoprazole   Suspension 40 milliGRAM(s) Enteral Tube <User Schedule>  petrolatum Ophthalmic Ointment 1 Application(s) Both EYES four times a day  polyethylene glycol 3350 17 Gram(s) Oral daily  predniSONE   Tablet 5 milliGRAM(s) Oral daily  QUEtiapine 12.5 milliGRAM(s) Oral <User Schedule>  senna Syrup 15 milliLiter(s) Oral at bedtime  simethicone 80 milliGRAM(s) Chew four times a day  sodium chloride 3%  Inhalation 4 milliLiter(s) Inhalation every 12 hours  triamcinolone 0.1% Ointment 1 Application(s) Topical two times a day  zinc oxide 40% Paste 1 Application(s) Topical daily    MEDICATIONS  (PRN):  acetaminophen   Oral Liquid .. 1000 milliGRAM(s) Enteral Tube every 6 hours PRN Temp greater or equal to 38C (100.4F), Mild Pain (1 - 3)  benzocaine 20% Spray 1 Spray(s) Topical four times a day PRN Cough  diphenhydrAMINE Elixir 25 milliGRAM(s) Oral every 6 hours PRN Rash and/or Itching  sodium chloride 0.65% Nasal 1 Spray(s) Both Nostrils every 6 hours PRN Nasal Congestion      LABS:                        8.7    7.47  )-----------( 136      ( 22 Nov 2023 16:23 )             28.5     11-23    131<L>  |  92<L>  |  17  ----------------------------<  306<H>  4.1   |  27  |  <0.30<L>    Ca    8.9      23 Nov 2023 07:02  Phos  3.2     11-23  Mg     1.6     11-23    TPro  7.5  /  Alb  3.4  /  TBili  0.4  /  DBili  x   /  AST  30  /  ALT  37  /  AlkPhos  62  11-22      Urinalysis Basic - ( 23 Nov 2023 07:02 )    Color: x / Appearance: x / SG: x / pH: x  Gluc: 306 mg/dL / Ketone: x  / Bili: x / Urobili: x   Blood: x / Protein: x / Nitrite: x   Leuk Esterase: x / RBC: x / WBC x   Sq Epi: x / Non Sq Epi: x / Bacteria: x          CAPILLARY BLOOD GLUCOSE    MICROBIOLOGY:     RADIOLOGY:  [ ] Reviewed and interpreted by me    Mode: AC/ CMV (Assist Control/ Continuous Mandatory Ventilation)  RR (machine): 12  TV (machine): 300  FiO2: 30  PEEP: 5  ITime: 1  MAP: 10  PIP: 29

## 2023-11-24 NOTE — PROGRESS NOTE ADULT - PROBLEM SELECTOR PLAN 4
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 Multifactorial, possible the PEG has been too tight at home.  - Peg loosened by GI at beside, no leakage or further intervention required   -- recurrent RLQ abdominal pain and bleeding at the PEG site>> __   -- reevaluated by GI __ -- no bleeding at the PEG site  - no BM since 11/19-- increased dose of Senna and increased Miralax to BID   - Continue Simethicone, cont monitoring

## 2023-11-24 NOTE — CONSULT NOTE ADULT - SUBJECTIVE AND OBJECTIVE BOX
HPI:  74 y/o F with PMHx of T2DM, HTN, HLD, anemia, hypothyroidism, RA, fibromyalgia, remote cerebral aneurysm repair, acute hypercapnic respiratory failure now with tracheostomy, PEG tube, and bedbound (trach change 8/18, PEG change 8/14), sacral pressure ulcer, BIBEMS from home for 6 day hx of bleeding from the tracheostomy and PEG tube with associated abdominal pain. Patient still having regular bowel movements, last one occurred prior to ED arrival. Was seen outpatient on 10/26 by critical care Dr. Zaik Gottlieb and apparently had cultures sent at that time. Patient also noted to have chronic sacral decubitous ulcer. Family endorsed one episode of fever, though was not febrile on evaluation. Daughter noted patient was recently experiencing auditory and visual hallucinations (seeing animals that were not there & hearing a "bomb" going off outside her home), though patient not actively hallucinating on evaluation.    ED course notable for CT neck imaging showing no evidence of active bleeding around the tracheostomy site, no hematomas, and no drainable collection around the tracheostomy site. CT abdomen/pelvis notable for gastrostomy tube localized to the stomach with mild thickening noted along the tract; a sacral decubitus ulcer extending to the coccyx with osseous remodeling, possibly representing osteomyelitis; and bibasilar patchy opacities with volume loss in the lungs, representing atelectasis vs infection. Patient admitted for respiratory support, wound care of tracheostomy and PEG, and management of presumed sacral osteomyelitis.   (28 Oct 2023 04:18)      Consulted for: hyperglycemia  74 y/o F with PMHx of T2DM, HTN, HLD, anemia, hypothyroidism, RA, fibromyalgia, remote cerebral aneurysm repair, acute hypercapnic respiratory failure now with tracheostomy, PEG tube, and bedbound (trach change 8/18, PEG change 8/14), sacral pressure ulcer, BIBEMS from home for 6 day hx of bleeding from the tracheostomy and PEG tube with associated abdominal pain.    PAST MEDICAL & SURGICAL HISTORY:  Diabetes      Rheumatoid arthritis      Fibromyalgia      Hypothyroid      Hypertension      H/O tracheostomy      PEG (percutaneous endoscopic gastrostomy) status          FAMILY HISTORY:      Social History:    Home Medications:  acetaminophen 650 mg/20.3 mL oral liquid: 20.3 milliliter(s) by gastrostomy tube every 6 hours as needed for  fever (10 Nov 2023 19:52)  acetylcysteine 10% inhalation solution: 600 milligram(s) orally (10 Nov 2023 19:33)  amLODIPine 10 mg oral tablet: 1 tab(s) by gastrostomy tube once a day (10 Nov 2023 19:42)  calcium carbonate 400 mg/5 mL oral suspension: 500 milligram(s) by gastrostomy tube 2 times a day (10 Nov 2023 19:49)  diclofenac sodium 1% topical cream: Apply topically to affected area every 6 hours as needed for  mild pain apply to both arms and legs (10 Nov 2023 19:47)  DuoNeb 0.5 mg-2.5 mg/3 mL inhalation solution: 3 milliliter(s) by nebulizer every 6 hours as needed for  shortness of breath and/or wheezing (10 Nov 2023 19:31)  fentaNYL 25 mcg/hr transdermal film, extended release: 1 patch transdermally every 3 days (10 Nov 2023 20:04)  ferrous sulfate (as elemental iron) 15 mg/1.5 mL oral liquid: 3 milliliter(s) by gastrostomy tube once a day (10 Nov 2023 19:56)  gabapentin 250 mg/5 mL oral solution: 2 milliliter(s) by gastrostomy tube once a day (at bedtime) (10 Nov 2023 20:02)  Levemir FlexPen 100 units/mL subcutaneous solution: 14 unit(s) subcutaneous once a day (in the morning) (10 Nov 2023 19:44)  Lexapro 10 mg oral tablet: 1 tab(s) orally (10 Nov 2023 19:48)  Lexapro 5 mg/5 mL oral solution: 10 milliliter(s) by gastrostomy tube once a day (10 Nov 2023 19:48)  melatonin 1 mg/mL oral solution: 6 milliliter(s) by gastrostomy tube once a day (at bedtime) (10 Nov 2023 20:03)  Mucomyst-10 inhalation solution: 600 milligram(s) by nebulizer every 6 hours as needed for  congestion (10 Nov 2023 19:33)  oxyBUTYnin 5 mg oral tablet: 1 tab(s) by gastrostomy tube once a day (10 Nov 2023 19:42)  oxyCODONE 10 mg oral tablet: 1 tab(s) by gastrostomy tube every 6 hours as needed for  severe pain (10 Nov 2023 20:01)  pantoprazole 40 mg oral delayed release tablet: 1 tab(s) orally once a day (before a meal) (23 Oct 2020 13:04)  predniSONE 5 mg oral tablet: 1 tab(s) orally once a day (23 Oct 2020 13:04)  Prolia 60 mg/mL subcutaneous solution: subcutaneous every 6 months (08 Oct 2020 00:19)  QUEtiapine 25 mg oral tablet: 0.5 tab(s) by gastrostomy tube once a day Given at 7PM (10 Nov 2023 20:00)  Saline Mist 0.65% nasal spray: 2 spray(s) intranasally every 4 hours as needed for  congestion (10 Nov 2023 19:45)  simethicone 50 mg/5 mL oral liquid: 125 milliliter(s) by gastrostomy tube every 6 hours (10 Nov 2023 19:43)  sodium chloride 0.9% inhalation solution: 3 milliliter(s) inhaled every 6 hours as needed for  congestion (10 Nov 2023 19:41)  Systane ophthalmic solution: 2 drop(s) in each eye every 6 hours both eyes (10 Nov 2023 19:45)  Systane Ultra ophthalmic solution: 1 dose(s) to each affected eye 2 times a day (08 Oct 2020 00:19)  Vitamin C with Анна Hips 500 mg oral tablet, chewable: 1 tab(s) by gastrostomy tube once a day (10 Nov 2023 19:58)      MEDICATIONS  (STANDING):  albuterol/ipratropium for Nebulization 3 milliLiter(s) Nebulizer every 6 hours  amLODIPine   Tablet 10 milliGRAM(s) Oral daily  artificial  tears Solution 2 Drop(s) Both EYES four times a day  ascorbic acid 500 milliGRAM(s) Oral daily  calcium carbonate    500 mG (Tums) Chewable 1 Tablet(s) Chew every 12 hours  cephalexin 500 milliGRAM(s) Oral every 6 hours  chlorhexidine 0.12% Liquid 15 milliLiter(s) Oral Mucosa every 12 hours  chlorhexidine 4% Liquid 1 Application(s) Topical <User Schedule>  ciprofloxacin   IVPB 400 milliGRAM(s) IV Intermittent every 12 hours  dextrose 50% Injectable 50 milliLiter(s) IV Push once  escitalopram 10 milliGRAM(s) Oral <User Schedule>  gabapentin Solution 100 milliGRAM(s) Enteral Tube at bedtime  insulin glargine Injectable (LANTUS) 25 Unit(s) SubCutaneous every morning  insulin lispro (ADMELOG) corrective regimen sliding scale   SubCutaneous every 6 hours  levothyroxine 125 MICROGram(s) Oral <User Schedule>  lidocaine   4% Patch 1 Patch Transdermal daily  melatonin 3 milliGRAM(s) Oral at bedtime  multivitamin/minerals/iron Oral Solution (CENTRUM) 15 milliLiter(s) Oral daily  nystatin Powder 1 Application(s) Topical every 8 hours  oxybutynin 5 milliGRAM(s) Oral daily  pantoprazole   Suspension 40 milliGRAM(s) Enteral Tube <User Schedule>  petrolatum Ophthalmic Ointment 1 Application(s) Both EYES four times a day  predniSONE   Tablet 5 milliGRAM(s) Oral daily  QUEtiapine 12.5 milliGRAM(s) Oral <User Schedule>  senna Syrup 15 milliLiter(s) Oral at bedtime  simethicone 80 milliGRAM(s) Chew four times a day  sodium chloride 3%  Inhalation 4 milliLiter(s) Inhalation every 12 hours  triamcinolone 0.1% Ointment 1 Application(s) Topical two times a day  zinc oxide 40% Paste 1 Application(s) Topical daily    MEDICATIONS  (PRN):  acetaminophen   Oral Liquid .. 1000 milliGRAM(s) Enteral Tube every 6 hours PRN Temp greater or equal to 38C (100.4F), Mild Pain (1 - 3)  benzocaine 20% Spray 1 Spray(s) Topical four times a day PRN Cough  diphenhydrAMINE Elixir 25 milliGRAM(s) Oral every 6 hours PRN Rash and/or Itching  sodium chloride 0.65% Nasal 1 Spray(s) Both Nostrils every 6 hours PRN Nasal Congestion      Allergies    penicillin (Unknown)  heparin (Unknown)  Tagamet (Unknown)  pineapple (Unknown)  walnut (Unknown)  Pecan, Filbert, Hazelnut (Unknown)  Lyrica (Unknown)    Intolerances      Review of Systems:  Constitutional: No fever  Eyes: No blurry vision  Neuro: No tremors  HEENT: No pain  Cardiovascular: No chest pain, palpitations  Respiratory: No SOB, no cough  GI: No nausea, vomiting, abdominal pain  : No dysuria  Skin: no rash  Psych: no depression  Endocrine: no polyuria, polydipsia  Hem/lymph: no swelling  Osteoporosis: no fractures    PHYSICAL EXAM:  VITALS: T(C): 37.6 (11-24-23 @ 03:27)  T(F): 99.7 (11-24-23 @ 03:27), Max: 99.7 (11-24-23 @ 03:27)  HR: 95 (11-24-23 @ 09:17) (82 - 107)  BP: 152/77 (11-24-23 @ 03:27) (114/51 - 152/77)  RR:  (15 - 22)  SpO2:  (99% - 100%)  Wt(kg): --  GENERAL: NAD  EYES: No proptosis, no lid lag, anicteric  THYROID: Normal size, no palpable nodules  RESPIRATORY: Clear to auscultation bilaterally  CARDIOVASCULAR: Regular rate and rhythm  GI: Soft, nontender, non distended  EXT: b/l feet without wounds; 2+ pulses  PSYCH: Alert and oriented x 3, reactive mood    POCT Blood Glucose.: 267 mg/dL (11-24-23 @ 08:38)  POCT Blood Glucose.: 213 mg/dL (11-24-23 @ 05:47)  POCT Blood Glucose.: 245 mg/dL (11-23-23 @ 23:33)  POCT Blood Glucose.: 294 mg/dL (11-23-23 @ 18:06)  POCT Blood Glucose.: 419 mg/dL (11-23-23 @ 11:36)  POCT Blood Glucose.: 467 mg/dL (11-23-23 @ 11:35)  POCT Blood Glucose.: 288 mg/dL (11-23-23 @ 07:31)  POCT Blood Glucose.: 320 mg/dL (11-23-23 @ 06:10)  POCT Blood Glucose.: 219 mg/dL (11-23-23 @ 00:22)  POCT Blood Glucose.: 273 mg/dL (11-22-23 @ 17:25)  POCT Blood Glucose.: 389 mg/dL (11-22-23 @ 11:42)  POCT Blood Glucose.: 294 mg/dL (11-22-23 @ 05:23)  POCT Blood Glucose.: 209 mg/dL (11-21-23 @ 23:29)  POCT Blood Glucose.: 205 mg/dL (11-21-23 @ 17:33)  POCT Blood Glucose.: 318 mg/dL (11-21-23 @ 11:43)                            8.7    7.47  )-----------( 136      ( 22 Nov 2023 16:23 )             28.5       11-23    131<L>  |  92<L>  |  17  ----------------------------<  306<H>  4.1   |  27  |  <0.30<L>    eGFR: 113    Ca    8.9      11-23  Mg     1.6     11-23  Phos  3.2     11-23    TPro  7.5  /  Alb  3.4  /  TBili  0.4  /  DBili  x   /  AST  30  /  ALT  37  /  AlkPhos  62  11-22      Thyroid Function Tests:  11-10 @ 06:26 TSH 7.90 FreeT4 0.7 T3 -- Anti TPO -- Anti Thyroglobulin Ab -- TSI --  11-09 @ 06:58 TSH 8.90 FreeT4 -- T3 -- Anti TPO -- Anti Thyroglobulin Ab -- TSI --      A1C with Estimated Average Glucose Result: 7.5 % (10-28-23 @ 07:18)          Radiology:                HPI:  74 y/o F with PMHx of T2DM, HTN, HLD, anemia, hypothyroidism, RA, fibromyalgia, remote cerebral aneurysm repair, acute hypercapnic respiratory failure now with tracheostomy, PEG tube, and bedbound (trach change 8/18, PEG change 8/14), sacral pressure ulcer, BIBEMS from home for 6 day hx of bleeding from the tracheostomy and PEG tube with associated abdominal pain. Patient still having regular bowel movements, last one occurred prior to ED arrival. Was seen outpatient on 10/26 by critical care Dr. Zaki Gottlieb and apparently had cultures sent at that time. Patient also noted to have chronic sacral decubitous ulcer. Family endorsed one episode of fever, though was not febrile on evaluation. Daughter noted patient was recently experiencing auditory and visual hallucinations (seeing animals that were not there & hearing a "bomb" going off outside her home), though patient not actively hallucinating on evaluation.    ED course notable for CT neck imaging showing no evidence of active bleeding around the tracheostomy site, no hematomas, and no drainable collection around the tracheostomy site. CT abdomen/pelvis notable for gastrostomy tube localized to the stomach with mild thickening noted along the tract; a sacral decubitus ulcer extending to the coccyx with osseous remodeling, possibly representing osteomyelitis; and bibasilar patchy opacities with volume loss in the lungs, representing atelectasis vs infection. Patient admitted for respiratory support, wound care of tracheostomy and PEG, and management of presumed sacral osteomyelitis.   (28 Oct 2023 04:18)      Consulted for: hyperglycemia  74 y/o F with PMHx of T2DM, HTN, HLD, anemia, hypothyroidism, RA, fibromyalgia, remote cerebral aneurysm repair, acute hypercapnic respiratory failure now with tracheostomy, PEG tube, and bedbound (trach change 8/18, PEG change 8/14), sacral pressure ulcer, BIBEMS from home for 6 day hx of bleeding from the tracheostomy and PEG tube with associated abdominal pain. Endocrinology consulted for hyperglycemia    Spoke with patient's daughter Marysol who reporst DM2 for 20 years, thought to be due to longterm high doses of prednisone for treatment of rheumatoid arthritis  Tube feeds have noted to be changing with increases in volume from 50ml/hr to 65ml/hr and also switching form KustomNote Peptide to KustomNote glucose support 18 hours per day  Patient has had hyperglycemia and sugars ranging in 300s with lantus 25 units each mornig and admelog 4 units TID AC  Daughter reports no known microvascular complications  Patients home insulin regimen prior to coming to this hospital was levemir 14 units daily and metformin 500mg BID  PAtient has been mostly hospitalized for the last 1 year and daughter reports varying insulin regimens and glucose control.    Of note, patient had TSH 7.9 and FT4 0.7 on 11/10 and levothyroxine was increased to 125mcg from 100mcg, which patient has been on for a few months.    Patient is limited in providing additional history due to tracheostomy       PAST MEDICAL & SURGICAL HISTORY:  Diabetes      Rheumatoid arthritis      Fibromyalgia      Hypothyroid      Hypertension      H/O tracheostomy      PEG (percutaneous endoscopic gastrostomy) status          FAMILY HISTORY: unable to obtain      Social History: presents from [facility]    Home Medications:  acetaminophen 650 mg/20.3 mL oral liquid: 20.3 milliliter(s) by gastrostomy tube every 6 hours as needed for  fever (10 Nov 2023 19:52)  acetylcysteine 10% inhalation solution: 600 milligram(s) orally (10 Nov 2023 19:33)  amLODIPine 10 mg oral tablet: 1 tab(s) by gastrostomy tube once a day (10 Nov 2023 19:42)  calcium carbonate 400 mg/5 mL oral suspension: 500 milligram(s) by gastrostomy tube 2 times a day (10 Nov 2023 19:49)  diclofenac sodium 1% topical cream: Apply topically to affected area every 6 hours as needed for  mild pain apply to both arms and legs (10 Nov 2023 19:47)  DuoNeb 0.5 mg-2.5 mg/3 mL inhalation solution: 3 milliliter(s) by nebulizer every 6 hours as needed for  shortness of breath and/or wheezing (10 Nov 2023 19:31)  fentaNYL 25 mcg/hr transdermal film, extended release: 1 patch transdermally every 3 days (10 Nov 2023 20:04)  ferrous sulfate (as elemental iron) 15 mg/1.5 mL oral liquid: 3 milliliter(s) by gastrostomy tube once a day (10 Nov 2023 19:56)  gabapentin 250 mg/5 mL oral solution: 2 milliliter(s) by gastrostomy tube once a day (at bedtime) (10 Nov 2023 20:02)  Levemir FlexPen 100 units/mL subcutaneous solution: 14 unit(s) subcutaneous once a day (in the morning) (10 Nov 2023 19:44)  Lexapro 10 mg oral tablet: 1 tab(s) orally (10 Nov 2023 19:48)  Lexapro 5 mg/5 mL oral solution: 10 milliliter(s) by gastrostomy tube once a day (10 Nov 2023 19:48)  melatonin 1 mg/mL oral solution: 6 milliliter(s) by gastrostomy tube once a day (at bedtime) (10 Nov 2023 20:03)  Mucomyst-10 inhalation solution: 600 milligram(s) by nebulizer every 6 hours as needed for  congestion (10 Nov 2023 19:33)  oxyBUTYnin 5 mg oral tablet: 1 tab(s) by gastrostomy tube once a day (10 Nov 2023 19:42)  oxyCODONE 10 mg oral tablet: 1 tab(s) by gastrostomy tube every 6 hours as needed for  severe pain (10 Nov 2023 20:01)  pantoprazole 40 mg oral delayed release tablet: 1 tab(s) orally once a day (before a meal) (23 Oct 2020 13:04)  predniSONE 5 mg oral tablet: 1 tab(s) orally once a day (23 Oct 2020 13:04)  Prolia 60 mg/mL subcutaneous solution: subcutaneous every 6 months (08 Oct 2020 00:19)  QUEtiapine 25 mg oral tablet: 0.5 tab(s) by gastrostomy tube once a day Given at 7PM (10 Nov 2023 20:00)  Saline Mist 0.65% nasal spray: 2 spray(s) intranasally every 4 hours as needed for  congestion (10 Nov 2023 19:45)  simethicone 50 mg/5 mL oral liquid: 125 milliliter(s) by gastrostomy tube every 6 hours (10 Nov 2023 19:43)  sodium chloride 0.9% inhalation solution: 3 milliliter(s) inhaled every 6 hours as needed for  congestion (10 Nov 2023 19:41)  Systane ophthalmic solution: 2 drop(s) in each eye every 6 hours both eyes (10 Nov 2023 19:45)  Systane Ultra ophthalmic solution: 1 dose(s) to each affected eye 2 times a day (08 Oct 2020 00:19)  Vitamin C with Анна Hips 500 mg oral tablet, chewable: 1 tab(s) by gastrostomy tube once a day (10 Nov 2023 19:58)      MEDICATIONS  (STANDING):  albuterol/ipratropium for Nebulization 3 milliLiter(s) Nebulizer every 6 hours  amLODIPine   Tablet 10 milliGRAM(s) Oral daily  artificial  tears Solution 2 Drop(s) Both EYES four times a day  ascorbic acid 500 milliGRAM(s) Oral daily  calcium carbonate    500 mG (Tums) Chewable 1 Tablet(s) Chew every 12 hours  cephalexin 500 milliGRAM(s) Oral every 6 hours  chlorhexidine 0.12% Liquid 15 milliLiter(s) Oral Mucosa every 12 hours  chlorhexidine 4% Liquid 1 Application(s) Topical <User Schedule>  ciprofloxacin   IVPB 400 milliGRAM(s) IV Intermittent every 12 hours  dextrose 50% Injectable 50 milliLiter(s) IV Push once  escitalopram 10 milliGRAM(s) Oral <User Schedule>  gabapentin Solution 100 milliGRAM(s) Enteral Tube at bedtime  insulin glargine Injectable (LANTUS) 25 Unit(s) SubCutaneous every morning  insulin lispro (ADMELOG) corrective regimen sliding scale   SubCutaneous every 6 hours  levothyroxine 125 MICROGram(s) Oral <User Schedule>  lidocaine   4% Patch 1 Patch Transdermal daily  melatonin 3 milliGRAM(s) Oral at bedtime  multivitamin/minerals/iron Oral Solution (CENTRUM) 15 milliLiter(s) Oral daily  nystatin Powder 1 Application(s) Topical every 8 hours  oxybutynin 5 milliGRAM(s) Oral daily  pantoprazole   Suspension 40 milliGRAM(s) Enteral Tube <User Schedule>  petrolatum Ophthalmic Ointment 1 Application(s) Both EYES four times a day  predniSONE   Tablet 5 milliGRAM(s) Oral daily  QUEtiapine 12.5 milliGRAM(s) Oral <User Schedule>  senna Syrup 15 milliLiter(s) Oral at bedtime  simethicone 80 milliGRAM(s) Chew four times a day  sodium chloride 3%  Inhalation 4 milliLiter(s) Inhalation every 12 hours  triamcinolone 0.1% Ointment 1 Application(s) Topical two times a day  zinc oxide 40% Paste 1 Application(s) Topical daily    MEDICATIONS  (PRN):  acetaminophen   Oral Liquid .. 1000 milliGRAM(s) Enteral Tube every 6 hours PRN Temp greater or equal to 38C (100.4F), Mild Pain (1 - 3)  benzocaine 20% Spray 1 Spray(s) Topical four times a day PRN Cough  diphenhydrAMINE Elixir 25 milliGRAM(s) Oral every 6 hours PRN Rash and/or Itching  sodium chloride 0.65% Nasal 1 Spray(s) Both Nostrils every 6 hours PRN Nasal Congestion      Allergies    penicillin (Unknown)  heparin (Unknown)  Tagamet (Unknown)  pineapple (Unknown)  walnut (Unknown)  Pecan, Filbert, Hazelnut (Unknown)  Lyrica (Unknown)    Intolerances      Review of Systems:  Constitutional: No fever  Eyes: No blurry vision  Neuro: No tremors  HEENT: No pain  Cardiovascular: No chest pain, palpitations  Respiratory: No SOB, no cough  GI: No nausea, vomiting, abdominal pain  : No dysuria  Skin: no rash  Psych: no depression  Endocrine: no polyuria, polydipsia  Hem/lymph: no swelling  Osteoporosis: no fractures    PHYSICAL EXAM:  VITALS: T(C): 37.6 (11-24-23 @ 03:27)  T(F): 99.7 (11-24-23 @ 03:27), Max: 99.7 (11-24-23 @ 03:27)  HR: 95 (11-24-23 @ 09:17) (82 - 107)  BP: 152/77 (11-24-23 @ 03:27) (114/51 - 152/77)  RR:  (15 - 22)  SpO2:  (99% - 100%)  Wt(kg): --  GENERAL: NAD  EYES: No proptosis, no lid lag, anicteric  THYROID: Normal size, no palpable nodules  RESPIRATORY: Clear to auscultation bilaterally  CARDIOVASCULAR: Regular rate and rhythm  GI: Soft, nontender, non distended  EXT: b/l feet without wounds; 2+ pulses  PSYCH: Alert and oriented x 3, reactive mood    POCT Blood Glucose.: 267 mg/dL (11-24-23 @ 08:38)  POCT Blood Glucose.: 213 mg/dL (11-24-23 @ 05:47)  POCT Blood Glucose.: 245 mg/dL (11-23-23 @ 23:33)  POCT Blood Glucose.: 294 mg/dL (11-23-23 @ 18:06)  POCT Blood Glucose.: 419 mg/dL (11-23-23 @ 11:36)  POCT Blood Glucose.: 467 mg/dL (11-23-23 @ 11:35)  POCT Blood Glucose.: 288 mg/dL (11-23-23 @ 07:31)  POCT Blood Glucose.: 320 mg/dL (11-23-23 @ 06:10)  POCT Blood Glucose.: 219 mg/dL (11-23-23 @ 00:22)  POCT Blood Glucose.: 273 mg/dL (11-22-23 @ 17:25)  POCT Blood Glucose.: 389 mg/dL (11-22-23 @ 11:42)  POCT Blood Glucose.: 294 mg/dL (11-22-23 @ 05:23)  POCT Blood Glucose.: 209 mg/dL (11-21-23 @ 23:29)  POCT Blood Glucose.: 205 mg/dL (11-21-23 @ 17:33)  POCT Blood Glucose.: 318 mg/dL (11-21-23 @ 11:43)                            8.7    7.47  )-----------( 136      ( 22 Nov 2023 16:23 )             28.5       11-23    131<L>  |  92<L>  |  17  ----------------------------<  306<H>  4.1   |  27  |  <0.30<L>    eGFR: 113    Ca    8.9      11-23  Mg     1.6     11-23  Phos  3.2     11-23    TPro  7.5  /  Alb  3.4  /  TBili  0.4  /  DBili  x   /  AST  30  /  ALT  37  /  AlkPhos  62  11-22      Thyroid Function Tests:  11-10 @ 06:26 TSH 7.90 FreeT4 0.7 T3 -- Anti TPO -- Anti Thyroglobulin Ab -- TSI --  11-09 @ 06:58 TSH 8.90 FreeT4 -- T3 -- Anti TPO -- Anti Thyroglobulin Ab -- TSI --      A1C with Estimated Average Glucose Result: 7.5 % (10-28-23 @ 07:18)          Radiology:

## 2023-11-24 NOTE — CHART NOTE - NSCHARTNOTEFT_GEN_A_CORE
Nutrition Follow Up Note  Patient seen for: follow up    Source: [] Patient       [x] Medical Record        [x] Nursing        [] Family at bedside      [x] Other: PA    -If unable to interview patient: [x] Trach/Vent/BiPAP  [] Disoriented/confused/inappropriate to interview    Diet, NPO with Tube Feed:   Tube Feeding Modality: Gastrostomy  [employer] glucose support 1.2  Total Volume for 24 Hours (mL): 1170  Continuous  Starting Tube Feed Rate {mL per Hour}: 65  Increase Tube Feed Rate by (mL): 0  Until Goal Tube Feed Rate (mL per Hour): 65  Tube Feed Duration (in Hours): 18  Tube Feed Start Time: 06:00  Tube Feed Stop Time: 03:00  Free Water Flush  Pump   Rate (mL per Hour): 10   Frequency: Every 2 Hours  Alfred(7 Gm Arginine/7 Gm Glut/1.2 Gm HMB     Qty per Day:  2 (23 @ 12:28) [Active]    EN order providing if 100% provision: 1170ml total volume, 1404kcal, 75g protein and 889ml free water     - Is current order appropriate/adequate? see below for recommendations  Patient changed from [employer] Peptide 1.5 to [employer] Glucose Support yesterday due to elevated blood glucose levels.     Nutrition-related concerns:  -free water flush ordered, see above.   -pt vegan, but per previous discussion with daughter, Alfred is okay. See nutrition note .   -Hgb A1c 7.5%. POCT Blood glucose levels being monitored. on insulin regimen to maintain glycemic control.   -PO synthroid ordered.   -Pt receives [employer] peptide 1.5 at home.    GI: lose stool noted  per flow sheets. Last BM  per flow sheets.  Bowel Regimen? [x] Yes   [] No    Weights:   10/29:  reports pt UBW 90 pounds prior to illness (~41kg).   Daily Weight in k.3 (), Daily Weight in k.9 (), Daily Weight in k.9 ()  RD will continue to monitor trends.     MEDICATIONS  (STANDING):  amLODIPine   Tablet  ascorbic acid  calcium carbonate    500 mG (Tums) Chewable  cephalexin  ciprofloxacin   IVPB  dextrose 50% Injectable  insulin glargine Injectable (LANTUS)  insulin lispro (ADMELOG) corrective regimen sliding scale  levothyroxine  multivitamin/minerals/iron Oral Solution (CENTRUM)  pantoprazole   Suspension  predniSONE   Tablet  senna Syrup  simethicone    Pertinent Labs:   A1C with Estimated Average Glucose Result: 7.5 % (10-28-23 @ 07:18)    Finger Sticks:  POCT Blood Glucose.: 267 mg/dL ( @ 08:38)  POCT Blood Glucose.: 213 mg/dL ( @ 05:47)  POCT Blood Glucose.: 245 mg/dL ( @ 23:33)  POCT Blood Glucose.: 294 mg/dL ( @ 18:06)  POCT Blood Glucose.: 419 mg/dL ( @ 11:36)  POCT Blood Glucose.: 467 mg/dL ( @ 11:35)    Skin per nursing documentation: sacrum stage IV per flow sheets.   Edema per nursing documentation:  1+ generalized per flow sheets     Estimated Needs using most recent weight   27-32kcal/kg 1400-1660kcal/day  1.2-1.4g/kg 62-73g protein/day  defer fluid needs to team    Previous Nutrition Diagnosis:  Increased Nutrient Needs  Nutrition Diagnosis is: [x] ongoing  [] resolved [] not applicable     Nutrition Care Plan:  [x] In Progress  [] Achieved  [] Not applicable    New Nutrition Diagnosis: [x] Not applicable    Nutrition Interventions:     Education Provided   [] Yes:  [x] No:     Recommendations:      -  -Alfred will provide additional glutamine and arginine amino acids with collagen for wound healing. continue.   -Continue Vitamin C and Multivitamin to aid in wound healing.   -Can continue 18 hour feeds to allow time off pump for synthroid administration.   -If lose stools persist, can consider d/c polyethylene glycol.  -Defer free water flush to team.     Monitoring and Evaluation:   Continue to monitor nutritional intake, tolerance to diet prescription, weights, labs, skin integrity    RD remains available upon request and will follow up per protocol  Rufina Dominguez MS, RD, CDN / Teams Nutrition Follow Up Note  Patient seen for: follow up    Source: [] Patient       [x] Medical Record        [x] Nursing        [x] Family on phone (Marysol Goal [phone])   [x] Other: PA    -If unable to interview patient: [x] Trach/Vent/BiPAP  [] Disoriented/confused/inappropriate to interview    Diet, NPO with Tube Feed:   Tube Feeding Modality: Gastrostomy  [employer] glucose support 1.2  Total Volume for 24 Hours (mL): 1170  Continuous  Starting Tube Feed Rate {mL per Hour}: 65  Increase Tube Feed Rate by (mL): 0  Until Goal Tube Feed Rate (mL per Hour): 65  Tube Feed Duration (in Hours): 18  Tube Feed Start Time: 06:00  Tube Feed Stop Time: 03:00  Free Water Flush  Pump   Rate (mL per Hour): 10   Frequency: Every 2 Hours  Alfred(7 Gm Arginine/7 Gm Glut/1.2 Gm HMB     Qty per Day:  2 (23 @ 12:28) [Active]    EN order providing if 100% provision: 1170ml total volume, 1404kcal (29kcal/kg), 75g protein (1.56g/kg) and 889ml free water     - Is current order appropriate/adequate? see below for recommendations  Patient changed from [employer] Peptide 1.5 to [employer] Glucose Support yesterday due to elevated blood glucose levels.     Nutrition-related concerns:  -free water flush ordered, see above.   -pt vegan, but per previous discussion with daughter, Alfred is okay. See nutrition note .   -Hgb A1c 7.5%. POCT Blood glucose levels being monitored. on insulin regimen to maintain glycemic control.   -PO synthroid ordered.   -Pt receives [employer] peptide 1.5 at home.    Last BM  per flow sheets.  Bowel Regimen? [x] Yes   [] No    Weights:   Dosing weight 54.6kg  10/29:  reports pt UBW 90 pounds prior to illness (~41kg).   Daily Weight in k.3 (), Daily Weight in k.9 (), Daily Weight in k.9 ()  Pt with some weight gain since admission. noted with edema (see below).   RD will continue to monitor trends.   IBW + 10% 48kg    MEDICATIONS  (STANDING):  amLODIPine   Tablet  ascorbic acid  calcium carbonate    500 mG (Tums) Chewable  cephalexin  ciprofloxacin   IVPB  dextrose 50% Injectable  insulin glargine Injectable (LANTUS)  insulin lispro (ADMELOG) corrective regimen sliding scale  levothyroxine  multivitamin/minerals/iron Oral Solution (CENTRUM)  pantoprazole   Suspension  predniSONE   Tablet  senna Syrup  simethicone    Pertinent Labs:   A1C with Estimated Average Glucose Result: 7.5 % (10-28-23 @ 07:18)    Finger Sticks:  POCT Blood Glucose.: 267 mg/dL ( @ 08:38)  POCT Blood Glucose.: 213 mg/dL ( @ 05:47)  POCT Blood Glucose.: 245 mg/dL ( @ 23:33)  POCT Blood Glucose.: 294 mg/dL ( @ 18:06)  POCT Blood Glucose.: 419 mg/dL ( @ 11:36)  POCT Blood Glucose.: 467 mg/dL ( @ 11:35)    Skin per nursing documentation: sacrum stage IV per flow sheets.   Edema per nursing documentation: trace generalized and left hip    Estimated Needs using IBW + 10% considering increased needs and edema   27-32kcal/kg 1296-1536kcal/day  1.3-1.6g/kg 62-77g protein/day  defer fluid needs to team    Previous Nutrition Diagnosis:  Increased Nutrient Needs  Nutrition Diagnosis is: [x] ongoing  [] resolved [] not applicable     Nutrition Care Plan:  [x] In Progress  [] Achieved  [] Not applicable    New Nutrition Diagnosis: [x] Not applicable    Nutrition Interventions:  Spoke with pt daughter over phone. Explained current tube feed formula, importance and reason for change. Explained calories and protein provided by tube feeds. pt weight discussed, current weight. Discussed Alfred, vitamin C and Multivitamin importance/amount. Zinc 220mg discontinued, explained reasoning why (interaction with copper). All questions/concerns addressed.     Recommendations:      -Can continue [employer] Glucose Support. This formula contains less carbohydrate per liter than [employer] Peptide 1.5. [employer] glucose support also has a low glycemic index. See above for what current regimen providing pt with. Update tube feed time: start 06:00 and run for 18 hours until midnight.  -Alfred will provide additional glutamine and arginine amino acids with collagen for wound healing. continue.   -Continue Vitamin C and Multivitamin to aid in wound healing.   -Can continue 18 hour feeds to allow time off pump for synthroid administration.   -Defer free water flush to team.     Monitoring and Evaluation:   Continue to monitor nutritional intake, tolerance to diet prescription, weights, labs, skin integrity    RD remains available upon request and will follow up per protocol  Rufina Dominguez, MS, RD, CDN / Teams

## 2023-11-24 NOTE — PROGRESS NOTE ADULT - NS ATTEND AMEND GEN_ALL_CORE FT
73F with a h/o DM, HTN, HLD, anemia, hypothyroidism, RA, fibromyalgia, remote cerebral aneurysm with repair, hypercapnic respiratory failure s/p tracheostomy/PEG, bedbound, sacral pressure ulcer. Admitted w/ bleeding from tracheostomy and PEG w/ associated abdominal pain, recent hx of auditory/visual hallucinations. Since admission, no further bleeding noted from either trach or PEG. Imaging showed mild thickening along PEG tube tract and sacral ulcer extending to coccyx suggestive of OM.        # Osteomyelitis  # Chronic hypoxemic RF  # oropharyngeal dysphagia  # acute on chronic pain  # Trach/Peg bleeding  # Tracheitis with Pseudomonas and serratia  # Hx of hallucination, anxiety    - mental status appropriate today, attempting to follow commands,  very weakly sampson  3+  - MRI showing sacral Osteo,  c/w wound care, no plans for surgery   -  s/p  course of Cefepime on 11/15 for sputum culture with PsA and serratia,   - now on Cipro and Keflex until December, f/u ID reccs   - f/u wound care reccs, improving wound, cont tx plan and outpt f/u  - fever w/u from 2 days aog so far ngtd  - c/w Airway clearance -  Duonebs and 3% Hypersal, chest PT  - monitor for signs of trach site bleeding, none noted today, contact ENT if  bleeding noted   - PS trials during the day, vent at night, unable to tolerate TC yet  - C/W pain control, multifactorial 2/2 to fibromyalgia as well as now osteo with pressure ulcer   - Peg irritation seen by GI, no bleeding and rec maalox gauze around site, H/H stable  - Thrombocytopenia - improved has been stable since admission at current level, no hemolysis and hit negative f/u Heme reccs  - Appreciate  follow up for delirium and hallucinations, patient calm and appropriate today - c/w Seroquel 12.5 mg in early evening and  Lexapro in AM   - Corneal erythema improved, no evidence of discharge, completed a course of Cipro eye drops, c/w artificial tears  - Appreciate derm consult for right back skin lesion- dermatofibroma, benign lesion. Mid back with irritated seborrheic keratosis, topical triamcinolone 0.1% bid cont, outpt f/u w/ Derm  - FS quite elevated despite increase in lantus, f/u Endo reccs for NPH tho daughter prefers not to use as has been on that before  - DVT ppx- scd (lovenox d/c after d/w Daughter given the prior blood she had from trach and peg sites)     GOC: when pt more alert will rediscuss GOC (when lucid in past she had desired DNR approach).

## 2023-11-24 NOTE — PROGRESS NOTE ADULT - PROBLEM SELECTOR PLAN 9
Lovenox for DVT ppx -- held today in s/o persistent thrombocytopenia _ call hematology consultation in am,     [facility]: Full code  __ long discussion of MICU team with daughter, pt remains FULL CODE, daughter wishes MOLST form reevaluation once pt is less delirious

## 2023-11-24 NOTE — PROGRESS NOTE ADULT - PROBLEM SELECTOR PLAN 7
- s/p Home Levemir 14 units in morning held on admission as noted w/ hypoglycemia   - increased Lantus to 25 U qam (11/21)   - Continue monitoring with sliding scale  - Endocrine consluted  - Enteral feed changed to Impulsiv diabetic formula - s/p Home Levemir 14 units in morning held on admission as noted w/ hypoglycemia   - increased Lantus to 25 U qam (11/21)   - Continue monitoring with sliding scale  - Endocrine consulted  - Enteral feed changed to [employer] glucose control formula

## 2023-11-24 NOTE — PROGRESS NOTE ADULT - SUBJECTIVE AND OBJECTIVE BOX
Patient is a 73y old  Female who presents with a chief complaint of peg tube   DATE OF SERVICE: 11-24-23 @ 10:58      SUBJECTIVE / OVERNIGHT EVENTS: no new events  No new signs    MEDICATIONS  (STANDING):  albuterol/ipratropium for Nebulization 3 milliLiter(s) Nebulizer every 6 hours  artificial  tears Solution 2 Drop(s) Both EYES four times a day  ascorbic acid 500 milliGRAM(s) Oral daily  bacitracin   Ointment 1 Application(s) Topical two times a day  bisacodyl Suppository 10 milliGRAM(s) Rectal once  calcium carbonate    500 mG (Tums) Chewable 1 Tablet(s) Chew every 12 hours  cephalexin 500 milliGRAM(s) Oral every 6 hours  chlorhexidine 0.12% Liquid 15 milliLiter(s) Oral Mucosa every 12 hours  chlorhexidine 4% Liquid 1 Application(s) Topical <User Schedule>  ciprofloxacin     Tablet 500 milliGRAM(s) Oral every 12 hours  dextrose 50% Injectable 50 milliLiter(s) IV Push once  enoxaparin Injectable 40 milliGRAM(s) SubCutaneous every 24 hours  escitalopram 10 milliGRAM(s) Oral daily  fentaNYL   Patch  25 MICROgram(s)/Hr 1 Patch Transdermal every 72 hours  gabapentin Solution 100 milliGRAM(s) Enteral Tube at bedtime  insulin glargine Injectable (LANTUS) 14 Unit(s) SubCutaneous every morning  insulin lispro (ADMELOG) corrective regimen sliding scale   SubCutaneous every 6 hours  levothyroxine 125 MICROGram(s) Oral <User Schedule>  lidocaine   4% Patch 1 Patch Transdermal daily  melatonin 3 milliGRAM(s) Oral at bedtime  multivitamin/minerals/iron Oral Solution (CENTRUM) 15 milliLiter(s) Oral daily  nystatin Powder 1 Application(s) Topical every 8 hours  oxybutynin 5 milliGRAM(s) Oral daily  pantoprazole   Suspension 40 milliGRAM(s) Enteral Tube daily  petrolatum Ophthalmic Ointment 1 Application(s) Both EYES four times a day  predniSONE   Tablet 5 milliGRAM(s) Oral daily  QUEtiapine 12.5 milliGRAM(s) Oral <User Schedule>  QUEtiapine 12.5 milliGRAM(s) Oral <User Schedule>  senna Syrup 10 milliLiter(s) Oral at bedtime  simethicone 80 milliGRAM(s) Chew four times a day  sodium chloride 3%  Inhalation 4 milliLiter(s) Inhalation every 12 hours  triamcinolone 0.1% Ointment 1 Application(s) Topical two times a day  zinc oxide 40% Paste 1 Application(s) Topical daily  zinc sulfate 220 milliGRAM(s) Oral daily    MEDICATIONS  (PRN):  acetaminophen   Oral Liquid .. 1000 milliGRAM(s) Enteral Tube every 6 hours PRN Temp greater or equal to 38C (100.4F), Mild Pain (1 - 3)  benzocaine 20% Spray 1 Spray(s) Topical four times a day PRN Cough  diphenhydrAMINE Elixir 25 milliGRAM(s) Oral every 6 hours PRN Rash and/or Itching  oxyCODONE    Solution 2.5 milliGRAM(s) Oral every 6 hours PRN Moderate Pain (4 - 6)  sodium chloride 0.65% Nasal 1 Spray(s) Both Nostrils every 6 hours PRN Nasal Congestion      Vital Signs Last 24 Hrs  Vital Signs Last 24 Hrs  T(C): 37.6 (24 Nov 2023 03:27), Max: 37.6 (24 Nov 2023 03:27)  T(F): 99.7 (24 Nov 2023 03:27), Max: 99.7 (24 Nov 2023 03:27)  HR: 95 (24 Nov 2023 09:17) (82 - 107)  BP: 152/77 (24 Nov 2023 03:27) (114/51 - 152/77)  BP(mean): --  RR: 17 (24 Nov 2023 03:27) (15 - 22)  SpO2: 100% (24 Nov 2023 09:17) (99% - 100%)    Parameters below as of 24 Nov 2023 05:45  Patient On (Oxygen Delivery Method): ventilator        CAPILLARY BLOOD GLUCOSE      POCT Blood Glucose.: 267 mg/dL (24 Nov 2023 08:38)  POCT Blood Glucose.: 213 mg/dL (24 Nov 2023 05:47)  POCT Blood Glucose.: 245 mg/dL (23 Nov 2023 23:33)  POCT Blood Glucose.: 294 mg/dL (23 Nov 2023 18:06)  POCT Blood Glucose.: 419 mg/dL (23 Nov 2023 11:36)  POCT Blood Glucose.: 467 mg/dL (23 Nov 2023 11:35)      I&O's Summary    17 Nov 2023 07:01  -  18 Nov 2023 07:00  --------------------------------------------------------  IN: 1300 mL / OUT: 950 mL / NET: 350 mL    18 Nov 2023 07:01  -  18 Nov 2023 17:32  --------------------------------------------------------  IN: 0 mL / OUT: 400 mL / NET: -400 mL        PHYSICAL EXAM:  GENERAL: NAD, well-developed  HEAD:  Atraumatic, Normocephalic  EYES: EOMI, PERRLA, conjunctiva and sclera clear  NECK: Supple, No JVD. On mech vent with a tracheostomy  CHEST/LUNG: Clear to auscultation bilaterally; No wheeze  HEART: Regular rate and rhythm; No murmurs, rubs, or gallops  ABDOMEN: Soft, Nontender, Nondistended; Bowel sounds present  EXTREMITIES:  2+ Peripheral Pulses, No clubbing, cyanosis, or edema  PSYCH: AAOx3  NEUROLOGY: non-focal. Functionally quadriplegic  SKIN: No rashes or lesions    LABS:                                 8.6    8.89  )-----------( 126      ( 21 Nov 2023 07:05 )             29.1                8.9    9.19  )-----------( 124      ( 18 Nov 2023 09:15 )             30.4     11-18    137  |  99  |  29<H>  ----------------------------<  224<H>  4.1   |  28  |  <0.30<L>    Ca    10.0      18 Nov 2023 09:15  Mg     1.8     11-18    TPro  7.4  /  Alb  3.3  /  TBili  0.3  /  DBili  x   /  AST  19  /  ALT  18  /  AlkPhos  48  11-18          Urinalysis Basic - ( 18 Nov 2023 09:15 )    Color: x / Appearance: x / SG: x / pH: x  Gluc: 224 mg/dL / Ketone: x  / Bili: x / Urobili: x   Blood: x / Protein: x / Nitrite: x   Leuk Esterase: x / RBC: x / WBC x   Sq Epi: x / Non Sq Epi: x / Bacteria: x        RADIOLOGY & ADDITIONAL TESTS:    Imaging Personally Reviewed:    Consultant(s) Notes Reviewed:      Care Discussed with Consultants/Other Providers:

## 2023-11-25 LAB
ANION GAP SERPL CALC-SCNC: 12 MMOL/L — SIGNIFICANT CHANGE UP (ref 5–17)
ANION GAP SERPL CALC-SCNC: 12 MMOL/L — SIGNIFICANT CHANGE UP (ref 5–17)
BUN SERPL-MCNC: 26 MG/DL — HIGH (ref 7–23)
BUN SERPL-MCNC: 26 MG/DL — HIGH (ref 7–23)
CALCIUM SERPL-MCNC: 9.6 MG/DL — SIGNIFICANT CHANGE UP (ref 8.4–10.5)
CALCIUM SERPL-MCNC: 9.6 MG/DL — SIGNIFICANT CHANGE UP (ref 8.4–10.5)
CHLORIDE SERPL-SCNC: 96 MMOL/L — SIGNIFICANT CHANGE UP (ref 96–108)
CHLORIDE SERPL-SCNC: 96 MMOL/L — SIGNIFICANT CHANGE UP (ref 96–108)
CO2 SERPL-SCNC: 27 MMOL/L — SIGNIFICANT CHANGE UP (ref 22–31)
CO2 SERPL-SCNC: 27 MMOL/L — SIGNIFICANT CHANGE UP (ref 22–31)
CREAT SERPL-MCNC: <0.3 MG/DL — LOW (ref 0.5–1.3)
CREAT SERPL-MCNC: <0.3 MG/DL — LOW (ref 0.5–1.3)
EGFR: 113 ML/MIN/1.73M2 — SIGNIFICANT CHANGE UP
EGFR: 113 ML/MIN/1.73M2 — SIGNIFICANT CHANGE UP
GLUCOSE BLDC GLUCOMTR-MCNC: 165 MG/DL — HIGH (ref 70–99)
GLUCOSE BLDC GLUCOMTR-MCNC: 165 MG/DL — HIGH (ref 70–99)
GLUCOSE BLDC GLUCOMTR-MCNC: 176 MG/DL — HIGH (ref 70–99)
GLUCOSE BLDC GLUCOMTR-MCNC: 176 MG/DL — HIGH (ref 70–99)
GLUCOSE BLDC GLUCOMTR-MCNC: 356 MG/DL — HIGH (ref 70–99)
GLUCOSE BLDC GLUCOMTR-MCNC: 356 MG/DL — HIGH (ref 70–99)
GLUCOSE BLDC GLUCOMTR-MCNC: 366 MG/DL — HIGH (ref 70–99)
GLUCOSE BLDC GLUCOMTR-MCNC: 366 MG/DL — HIGH (ref 70–99)
GLUCOSE BLDC GLUCOMTR-MCNC: 407 MG/DL — HIGH (ref 70–99)
GLUCOSE BLDC GLUCOMTR-MCNC: 407 MG/DL — HIGH (ref 70–99)
GLUCOSE BLDC GLUCOMTR-MCNC: 418 MG/DL — HIGH (ref 70–99)
GLUCOSE BLDC GLUCOMTR-MCNC: 418 MG/DL — HIGH (ref 70–99)
GLUCOSE SERPL-MCNC: 173 MG/DL — HIGH (ref 70–99)
GLUCOSE SERPL-MCNC: 173 MG/DL — HIGH (ref 70–99)
HCT VFR BLD CALC: 27.1 % — LOW (ref 34.5–45)
HCT VFR BLD CALC: 27.1 % — LOW (ref 34.5–45)
HGB BLD-MCNC: 8.1 G/DL — LOW (ref 11.5–15.5)
HGB BLD-MCNC: 8.1 G/DL — LOW (ref 11.5–15.5)
MAGNESIUM SERPL-MCNC: 1.8 MG/DL — SIGNIFICANT CHANGE UP (ref 1.6–2.6)
MAGNESIUM SERPL-MCNC: 1.8 MG/DL — SIGNIFICANT CHANGE UP (ref 1.6–2.6)
MCHC RBC-ENTMCNC: 28 PG — SIGNIFICANT CHANGE UP (ref 27–34)
MCHC RBC-ENTMCNC: 28 PG — SIGNIFICANT CHANGE UP (ref 27–34)
MCHC RBC-ENTMCNC: 29.9 GM/DL — LOW (ref 32–36)
MCHC RBC-ENTMCNC: 29.9 GM/DL — LOW (ref 32–36)
MCV RBC AUTO: 93.8 FL — SIGNIFICANT CHANGE UP (ref 80–100)
MCV RBC AUTO: 93.8 FL — SIGNIFICANT CHANGE UP (ref 80–100)
NRBC # BLD: 0 /100 WBCS — SIGNIFICANT CHANGE UP (ref 0–0)
NRBC # BLD: 0 /100 WBCS — SIGNIFICANT CHANGE UP (ref 0–0)
PHOSPHATE SERPL-MCNC: 4.4 MG/DL — SIGNIFICANT CHANGE UP (ref 2.5–4.5)
PHOSPHATE SERPL-MCNC: 4.4 MG/DL — SIGNIFICANT CHANGE UP (ref 2.5–4.5)
PLATELET # BLD AUTO: 123 K/UL — LOW (ref 150–400)
PLATELET # BLD AUTO: 123 K/UL — LOW (ref 150–400)
POTASSIUM SERPL-MCNC: 4.4 MMOL/L — SIGNIFICANT CHANGE UP (ref 3.5–5.3)
POTASSIUM SERPL-MCNC: 4.4 MMOL/L — SIGNIFICANT CHANGE UP (ref 3.5–5.3)
POTASSIUM SERPL-SCNC: 4.4 MMOL/L — SIGNIFICANT CHANGE UP (ref 3.5–5.3)
POTASSIUM SERPL-SCNC: 4.4 MMOL/L — SIGNIFICANT CHANGE UP (ref 3.5–5.3)
RBC # BLD: 2.89 M/UL — LOW (ref 3.8–5.2)
RBC # BLD: 2.89 M/UL — LOW (ref 3.8–5.2)
RBC # FLD: 17.6 % — HIGH (ref 10.3–14.5)
RBC # FLD: 17.6 % — HIGH (ref 10.3–14.5)
SODIUM SERPL-SCNC: 135 MMOL/L — SIGNIFICANT CHANGE UP (ref 135–145)
SODIUM SERPL-SCNC: 135 MMOL/L — SIGNIFICANT CHANGE UP (ref 135–145)
TRIGL SERPL-MCNC: 192 MG/DL — HIGH
TRIGL SERPL-MCNC: 192 MG/DL — HIGH
WBC # BLD: 7.46 K/UL — SIGNIFICANT CHANGE UP (ref 3.8–10.5)
WBC # BLD: 7.46 K/UL — SIGNIFICANT CHANGE UP (ref 3.8–10.5)
WBC # FLD AUTO: 7.46 K/UL — SIGNIFICANT CHANGE UP (ref 3.8–10.5)
WBC # FLD AUTO: 7.46 K/UL — SIGNIFICANT CHANGE UP (ref 3.8–10.5)

## 2023-11-25 PROCEDURE — 99232 SBSQ HOSP IP/OBS MODERATE 35: CPT

## 2023-11-25 RX ORDER — GUAIFENESIN/DEXTROMETHORPHAN 600MG-30MG
10 TABLET, EXTENDED RELEASE 12 HR ORAL EVERY 6 HOURS
Refills: 0 | Status: DISCONTINUED | OUTPATIENT
Start: 2023-11-25 | End: 2024-01-17

## 2023-11-25 RX ORDER — INSULIN HUMAN 100 [IU]/ML
17 INJECTION, SOLUTION SUBCUTANEOUS
Refills: 0 | Status: DISCONTINUED | OUTPATIENT
Start: 2023-11-25 | End: 2023-11-30

## 2023-11-25 RX ORDER — INSULIN HUMAN 100 [IU]/ML
15 INJECTION, SOLUTION SUBCUTANEOUS
Refills: 0 | Status: DISCONTINUED | OUTPATIENT
Start: 2023-11-25 | End: 2023-11-25

## 2023-11-25 RX ADMIN — Medication 3 MILLILITER(S): at 11:06

## 2023-11-25 RX ADMIN — Medication 1 APPLICATION(S): at 05:13

## 2023-11-25 RX ADMIN — Medication 1 APPLICATION(S): at 19:27

## 2023-11-25 RX ADMIN — Medication 10 MILLILITER(S): at 12:35

## 2023-11-25 RX ADMIN — Medication 2 DROP(S): at 05:13

## 2023-11-25 RX ADMIN — Medication 125 MICROGRAM(S): at 05:17

## 2023-11-25 RX ADMIN — NYSTATIN CREAM 1 APPLICATION(S): 100000 CREAM TOPICAL at 05:15

## 2023-11-25 RX ADMIN — Medication 200 MILLIGRAM(S): at 05:12

## 2023-11-25 RX ADMIN — Medication 1000 MILLIGRAM(S): at 07:48

## 2023-11-25 RX ADMIN — NYSTATIN CREAM 1 APPLICATION(S): 100000 CREAM TOPICAL at 13:37

## 2023-11-25 RX ADMIN — Medication 3 MILLILITER(S): at 23:51

## 2023-11-25 RX ADMIN — Medication 1 APPLICATION(S): at 17:56

## 2023-11-25 RX ADMIN — SODIUM CHLORIDE 4 MILLILITER(S): 9 INJECTION INTRAMUSCULAR; INTRAVENOUS; SUBCUTANEOUS at 05:29

## 2023-11-25 RX ADMIN — Medication 5 MILLIGRAM(S): at 12:33

## 2023-11-25 RX ADMIN — CHLORHEXIDINE GLUCONATE 1 APPLICATION(S): 213 SOLUTION TOPICAL at 05:14

## 2023-11-25 RX ADMIN — Medication 500 MILLIGRAM(S): at 12:33

## 2023-11-25 RX ADMIN — INSULIN HUMAN 17 UNIT(S): 100 INJECTION, SOLUTION SUBCUTANEOUS at 12:48

## 2023-11-25 RX ADMIN — ESCITALOPRAM OXALATE 10 MILLIGRAM(S): 10 TABLET, FILM COATED ORAL at 07:48

## 2023-11-25 RX ADMIN — Medication 3 MILLILITER(S): at 05:29

## 2023-11-25 RX ADMIN — Medication 500 MILLIGRAM(S): at 23:46

## 2023-11-25 RX ADMIN — QUETIAPINE FUMARATE 12.5 MILLIGRAM(S): 200 TABLET, FILM COATED ORAL at 17:55

## 2023-11-25 RX ADMIN — CHLORHEXIDINE GLUCONATE 15 MILLILITER(S): 213 SOLUTION TOPICAL at 17:54

## 2023-11-25 RX ADMIN — LIDOCAINE 1 PATCH: 4 CREAM TOPICAL at 19:29

## 2023-11-25 RX ADMIN — Medication 15 MILLILITER(S): at 12:34

## 2023-11-25 RX ADMIN — INSULIN HUMAN 13 UNIT(S): 100 INJECTION, SOLUTION SUBCUTANEOUS at 05:38

## 2023-11-25 RX ADMIN — SIMETHICONE 80 MILLIGRAM(S): 80 TABLET, CHEWABLE ORAL at 23:47

## 2023-11-25 RX ADMIN — SENNA PLUS 15 MILLILITER(S): 8.6 TABLET ORAL at 21:41

## 2023-11-25 RX ADMIN — Medication 1 TABLET(S): at 05:12

## 2023-11-25 RX ADMIN — Medication 1 APPLICATION(S): at 12:37

## 2023-11-25 RX ADMIN — Medication 1000 MILLIGRAM(S): at 08:30

## 2023-11-25 RX ADMIN — Medication 2: at 17:52

## 2023-11-25 RX ADMIN — INSULIN GLARGINE 30 UNIT(S): 100 INJECTION, SOLUTION SUBCUTANEOUS at 08:30

## 2023-11-25 RX ADMIN — Medication 12: at 12:34

## 2023-11-25 RX ADMIN — SIMETHICONE 80 MILLIGRAM(S): 80 TABLET, CHEWABLE ORAL at 12:33

## 2023-11-25 RX ADMIN — Medication 2: at 05:38

## 2023-11-25 RX ADMIN — Medication 200 MILLIGRAM(S): at 17:53

## 2023-11-25 RX ADMIN — NYSTATIN CREAM 1 APPLICATION(S): 100000 CREAM TOPICAL at 23:47

## 2023-11-25 RX ADMIN — Medication 500 MILLIGRAM(S): at 17:59

## 2023-11-25 RX ADMIN — LIDOCAINE 1 PATCH: 4 CREAM TOPICAL at 12:36

## 2023-11-25 RX ADMIN — ZINC OXIDE 1 APPLICATION(S): 200 OINTMENT TOPICAL at 10:30

## 2023-11-25 RX ADMIN — SODIUM CHLORIDE 4 MILLILITER(S): 9 INJECTION INTRAMUSCULAR; INTRAVENOUS; SUBCUTANEOUS at 17:29

## 2023-11-25 RX ADMIN — Medication 2 DROP(S): at 17:55

## 2023-11-25 RX ADMIN — CHLORHEXIDINE GLUCONATE 15 MILLILITER(S): 213 SOLUTION TOPICAL at 05:14

## 2023-11-25 RX ADMIN — INSULIN HUMAN 17 UNIT(S): 100 INJECTION, SOLUTION SUBCUTANEOUS at 17:53

## 2023-11-25 RX ADMIN — Medication 3 MILLILITER(S): at 17:29

## 2023-11-25 RX ADMIN — GABAPENTIN 100 MILLIGRAM(S): 400 CAPSULE ORAL at 21:41

## 2023-11-25 RX ADMIN — Medication 5 MILLIGRAM(S): at 05:11

## 2023-11-25 RX ADMIN — AMLODIPINE BESYLATE 10 MILLIGRAM(S): 2.5 TABLET ORAL at 05:12

## 2023-11-25 RX ADMIN — PANTOPRAZOLE SODIUM 40 MILLIGRAM(S): 20 TABLET, DELAYED RELEASE ORAL at 05:19

## 2023-11-25 RX ADMIN — SIMETHICONE 80 MILLIGRAM(S): 80 TABLET, CHEWABLE ORAL at 17:54

## 2023-11-25 RX ADMIN — SIMETHICONE 80 MILLIGRAM(S): 80 TABLET, CHEWABLE ORAL at 05:12

## 2023-11-25 RX ADMIN — Medication 10 MILLILITER(S): at 05:15

## 2023-11-25 RX ADMIN — Medication 3 MILLILITER(S): at 01:04

## 2023-11-25 RX ADMIN — Medication 2 DROP(S): at 12:36

## 2023-11-25 RX ADMIN — Medication 500 MILLIGRAM(S): at 05:14

## 2023-11-25 RX ADMIN — Medication 3 MILLIGRAM(S): at 21:41

## 2023-11-25 RX ADMIN — LIDOCAINE 1 PATCH: 4 CREAM TOPICAL at 06:48

## 2023-11-25 RX ADMIN — Medication 1 TABLET(S): at 17:54

## 2023-11-25 NOTE — PROGRESS NOTE ADULT - NS ATTEND AMEND GEN_ALL_CORE FT
as above:  73F with a h/o DM, HTN, HLD, anemia, hypothyroidism, RA, fibromyalgia, remote cerebral aneurysm with repair, hypercapnic respiratory failure s/p tracheostomy/PEG, bedbound, sacral pressure ulcer. Admitted w/ bleeding from tracheostomy and PEG w/ associated abdominal pain, recent hx of auditory/visual hallucinations. Since admission, no further bleeding noted from either trach or PEG. Imaging showed mild thickening along PEG tube tract and sacral ulcer extending to coccyx suggestive of OM.        # Osteomyelitis  # Chronic hypoxemic RF  # oropharyngeal dysphagia  # acute on chronic pain  # Trach/Peg bleeding  # Tracheitis with Pseudomonas and serratia  # Hx of hallucination, anxiety    Neuro- mental status appropriate, attempting to follow commands,  very weakly sampson  3+  ortho- MRI showing sacral Osteo,  c/w wound care, no plans for surgery   ID-  s/p  course of Cefepime on 11/15 for sputum culture with PsA and serratia, now on Cipro and Keflex until December, f/u ID   Wound care- f/u wound care reccs, improving wound, cont tx plan and outpt f/u  Resp- c/w Airway clearance -Duonebs and 3% Hypersal, chest PT,- PS trials during the day, vent at night, unable to tolerate TC yet  ENT- monitor for signs of trach site bleeding, none noted contact ENT if  bleeding noted (blood tinged due to suction trauma)  Pain- C/W pain control, multifactorial 2/2 to fibromyalgia as well as now osteo with pressure ulcer   GI- Peg irritation seen by GI again today, no bleeding and rec maalox gauze around site, H/H stable- negative AXR 11/22  Heme- Thrombocytopenia - improved has been stable since admission at current level    Neuro- Appreciate  follow up for delirium and hallucinations, patient calm and appropriate today - c/w Seroquel 12.5 mg in           early evening and  Lexapro in AM   optho- Corneal erythema improved, no evidence of discharge, completed a course of Cipro eye drops, c/w artificial tears  Derm- Appreciate derm consult for right back skin lesion- dermatofibroma, benign lesion. Mid back with irritated seborrheic             keratosis, topical triamcinolone 0.1% bid cont, outpt f/u w/ Derm  Endo- FS quite elvated despite increase in lantus and no other change in diet or meds (only topical steroid) will get Endo input  - DVT ppx- scd (lovenox d/c after d/w Daughter given the prior blood she had from trach and peg sites)     GOC: when pt more alert will rediscuss GOC (when lucid in past she had desired DNR approach)  Cesar Hernandez MD-Pulmonary   [phone] as above: no new events  73F with a h/o DM, HTN, HLD, anemia, hypothyroidism, RA, fibromyalgia, remote cerebral aneurysm with repair, hypercapnic respiratory failure s/p tracheostomy/PEG, bedbound, sacral pressure ulcer. Admitted w/ bleeding from tracheostomy and PEG w/ associated abdominal pain, recent hx of auditory/visual hallucinations. Since admission, no further bleeding noted from either trach or PEG. Imaging showed mild thickening along PEG tube tract and sacral ulcer extending to coccyx suggestive of OM.        # Osteomyelitis  # Chronic hypoxemic RF  # oropharyngeal dysphagia  # acute on chronic pain  # Trach/Peg bleeding  # Tracheitis with Pseudomonas and serratia  # Hx of hallucination, anxiety    Neuro- mental status appropriate, attempting to follow commands,  very weakly sampson  3+  ortho- MRI showing sacral Osteo,  c/w wound care, no plans for surgery   ID-  s/p  course of Cefepime on 11/15 for sputum culture with PsA and serratia, now on Cipro and Keflex until December, f/u ID   Wound care- f/u wound care reccs, improving wound, cont tx plan and outpt f/u  Resp- c/w Airway clearance -Duonebs and 3% Hypersal, chest PT,- PS trials during the day, vent at night, unable to tolerate TC yet  ENT- monitor for signs of trach site bleeding, none noted contact ENT if  bleeding noted (blood tinged due to suction trauma)  Pain- C/W pain control, multifactorial 2/2 to fibromyalgia as well as now osteo with pressure ulcer   GI- Peg irritation seen by GI again today, no bleeding and rec maalox gauze around site, H/H stable- negative AXR 11/22  Heme- Thrombocytopenia - improved has been stable since admission at current level    Neuro- Appreciate  follow up for delirium and hallucinations, patient calm and appropriate today - c/w Seroquel 12.5 mg in           early evening and  Lexapro in AM   optho- Corneal erythema improved, no evidence of discharge, completed a course of Cipro eye drops, c/w artificial tears  Derm- Appreciate derm consult for right back skin lesion- dermatofibroma, benign lesion. Mid back with irritated seborrheic             keratosis, topical triamcinolone 0.1% bid cont, outpt f/u w/ Derm  Endo- FS quite elvated despite increase in lantus and no other change in diet or meds (only topical steroid) will get Endo input  - DVT ppx- scd (lovenox d/c after d/w Daughter given the prior blood she had from trach and peg sites)     GOC: when pt more alert will re-discuss GOC (when lucid in past she had desired DNR approach)  Cesar Hernandez MD-Pulmonary   [phone]

## 2023-11-25 NOTE — PROGRESS NOTE ADULT - ASSESSMENT
72 y/o F with PMHx of T2DM, HTN, HLD, anemia, hypothyroidism, RA, fibromyalgia, remote cerebral aneurysm repair, acute hypercapnic respiratory failure now with tracheostomy, PEG tube, and bedbound (trach change 8/18, PEG change 8/14), sacral pressure ulcer, BIBEMS from home for 6 day hx of bleeding from the tracheostomy and PEG tube with associated abdominal pain. Endocrinology consulted for hyperglycemia    #DM2 - A1c 7.5 10/2023  #tube feeds  #stress hyperglycemia  Home medications: Levemir 14 units qhs, Humalog Moderate dose scale (4 units for bg 201-250, 6 units for bg 251-300 , 8 units 301-350) while on EUDOWEB tube feeding 975 ML at home   Early morning BG reasonable, BG rising after TF started, adjust regular insulin dosage  -C/w lantus to 30 units daily  -Increase to 15 units regular to be dosed at 6AM, 12PM, and 6PM  -FSG q6 hours  -moderate admelog correction scale every 6 hours  -FSG goal 140-180 while on tube feeds  -please ensure no antibiotics are being given in dextrose and if so, please switch them to NS if possible    #discharge  -at least basal insulin, dose to be determined    #Hypothyroidism  -c/w levothyroxine 125mcg daily  -please repeat TSH and free thyroxine     #chronic steroids and risk of tertiary AI  -c/w prednisone 5mg daily  -sick day rules discussed with patient's daughter  -BP stable and has been having hyperglycemia, thus would not increase dose of prednisone at this time  -please check 25 hydroxyvitamind      discussed with patient and primary team  Sylvain CORONEL And jesus at bedside   Contact via [employer] Teams during business hours  To reach covering provider access AMAURI via [email or web address]  For Urgent matters/after-hours/weekends/holidays please page endocrine fellow on call   For nonurgent matters please email [email or web address]    Please note that this patient may be followed by different provider tomorrow.  Notify endocrine 24 hours prior to discharge for final recommendations  74 y/o F with PMHx of T2DM, HTN, HLD, anemia, hypothyroidism, RA, fibromyalgia, remote cerebral aneurysm repair, acute hypercapnic respiratory failure now with tracheostomy, PEG tube, and bedbound (trach change 8/18, PEG change 8/14), sacral pressure ulcer, BIBEMS from home for 6 day hx of bleeding from the tracheostomy and PEG tube with associated abdominal pain. Endocrinology consulted for hyperglycemia    #DM2 - A1c 7.5 10/2023  #tube feeds  #stress hyperglycemia  Home medications: Levemir 14 units qhs, Humalog Moderate dose scale (4 units for bg 201-250, 6 units for bg 251-300 , 8 units 301-350) while on [employer] tube feeding 975 ML at home   Early morning BG reasonable, BG rising after TF started, adjust regular insulin dosage  -C/w lantus to 30 units daily  -Increase to 17 units regular to be dosed at 6AM, 12PM, and 6PM  -FSG q6 hours  -moderate admelog correction scale every 6 hours  -FSG goal 140-180 while on tube feeds  -please ensure no antibiotics are being given in dextrose and if so, please switch them to NS if possible    #discharge  -at least basal insulin, dose to be determined    #Hypothyroidism  -c/w levothyroxine 125mcg daily  -please repeat TSH and free thyroxine     #chronic steroids and risk of tertiary AI  -c/w prednisone 5mg daily  -sick day rules discussed with patient's daughter  -BP stable and has been having hyperglycemia, thus would not increase dose of prednisone at this time  -please check 25 hydroxyvitamind      discussed with patient and primary team  Sylvain CORONEL And jesus at bedside   Contact via [employer] Teams during business hours  To reach covering provider access AMAURI via [employer]  For Urgent matters/after-hours/weekends/holidays please page endocrine fellow on call   For nonurgent matters please email [email or web address]    Please note that this patient may be followed by different provider tomorrow.  Notify endocrine 24 hours prior to discharge for final recommendations

## 2023-11-25 NOTE — PROGRESS NOTE ADULT - SUBJECTIVE AND OBJECTIVE BOX
seen earlier today     Chief Complaint: Diabetes Mellitus follow up    INTERVAL HX: BG remains mostly above goal tolerating [facility] [employer] tube feeding, daughter at bedside expressing concern over mothers "delusions" when BG high or hypercapnic reports when BG was severely elevated close to 500 patient made statement of "im going to go now" in [place]. discussed with team , psych is also following and daughter relayed this to team as well per team.   per daughter tube feed at home is 975 mL TF however TF can sometimes run longer than 18hours  to compensate for time it was held if it was paused for ADL Care or if patient has gas discomfort        Review of Systems:  General: As above  GI: No nausea, vomiting  Endocrine: no  S&Sx of hypoglycemia    Allergies    penicillin (Unknown)  heparin (Unknown)  Tagamet (Unknown)  pineapple (Unknown)  walnut (Unknown)  Pecan, Filbert, Hazelnut (Unknown)  Lyrica (Unknown)    Intolerances      MEDICATIONS  (STANDING):  albuterol/ipratropium for Nebulization 3 milliLiter(s) Nebulizer every 6 hours  amLODIPine   Tablet 10 milliGRAM(s) Oral daily  artificial  tears Solution 2 Drop(s) Both EYES four times a day  ascorbic acid 500 milliGRAM(s) Oral daily  calcium carbonate    500 mG (Tums) Chewable 1 Tablet(s) Chew every 12 hours  cephalexin 500 milliGRAM(s) Oral every 6 hours  chlorhexidine 0.12% Liquid 15 milliLiter(s) Oral Mucosa every 12 hours  chlorhexidine 4% Liquid 1 Application(s) Topical <User Schedule>  ciprofloxacin   IVPB 400 milliGRAM(s) IV Intermittent every 12 hours  dextrose 50% Injectable 12.5 Gram(s) IV Push once  dextrose 50% Injectable 50 milliLiter(s) IV Push once  dextrose 50% Injectable 25 Gram(s) IV Push once  dextrose 50% Injectable 25 Gram(s) IV Push once  dextrose Oral Gel 15 Gram(s) Oral once  escitalopram 10 milliGRAM(s) Oral <User Schedule>  gabapentin Solution 100 milliGRAM(s) Enteral Tube at bedtime  glucagon  Injectable 1 milliGRAM(s) IntraMuscular once  guaifenesin/dextromethorphan Oral Liquid 10 milliLiter(s) Oral every 6 hours  insulin glargine Injectable (LANTUS) 30 Unit(s) SubCutaneous every morning  insulin lispro (ADMELOG) corrective regimen sliding scale   SubCutaneous every 6 hours  insulin regular  human recombinant 13 Unit(s) SubCutaneous <User Schedule>  levothyroxine 125 MICROGram(s) Oral <User Schedule>  lidocaine   4% Patch 1 Patch Transdermal daily  melatonin 3 milliGRAM(s) Oral at bedtime  multivitamin/minerals/iron Oral Solution (CENTRUM) 15 milliLiter(s) Oral daily  nystatin Powder 1 Application(s) Topical every 8 hours  oxybutynin 5 milliGRAM(s) Oral daily  pantoprazole   Suspension 40 milliGRAM(s) Enteral Tube <User Schedule>  petrolatum Ophthalmic Ointment 1 Application(s) Both EYES four times a day  predniSONE   Tablet 5 milliGRAM(s) Oral daily  QUEtiapine 12.5 milliGRAM(s) Oral <User Schedule>  senna Syrup 15 milliLiter(s) Oral at bedtime  simethicone 80 milliGRAM(s) Chew four times a day  sodium chloride 3%  Inhalation 4 milliLiter(s) Inhalation every 12 hours  triamcinolone 0.1% Ointment 1 Application(s) Topical two times a day  zinc oxide 40% Paste 1 Application(s) Topical daily        insulin glargine Injectable (LANTUS)   30 Unit(s) SubCutaneous (11-25-23 @ 08:30)    insulin lispro (ADMELOG) corrective regimen sliding scale   2 Unit(s) SubCutaneous (11-25-23 @ 05:38)   4 Unit(s) SubCutaneous (11-24-23 @ 23:30)   4 Unit(s) SubCutaneous (11-24-23 @ 18:32)   10 Unit(s) SubCutaneous (11-24-23 @ 12:55)    insulin regular  human recombinant   13 Unit(s) SubCutaneous (11-25-23 @ 05:38)   13 Unit(s) SubCutaneous (11-24-23 @ 18:56)    levothyroxine   125 MICROGram(s) Oral (11-25-23 @ 05:17)    predniSONE   Tablet   5 milliGRAM(s) Oral (11-25-23 @ 05:11)        PHYSICAL EXAM:  VITALS: T(C): 36.3 (11-25-23 @ 04:51)  T(F): 97.3 (11-25-23 @ 04:51), Max: 99.1 (11-24-23 @ 23:55)  HR: 82 (11-25-23 @ 11:45) (82 - 94)  BP: 146/69 (11-25-23 @ 04:51) (114/55 - 146/69)  RR:  (14 - 20)  SpO2:  (99% - 100%)  Wt(kg): --  GENERAL: NAD, peg with TF running,   Respiratory: Respirations unlabored trach to vent   Extremities: Warm, no edema  NEURO: Alert    LABS:  POCT Blood Glucose.: 356 mg/dL (11-25-23 @ 08:22)  POCT Blood Glucose.: 366 mg/dL (11-25-23 @ 08:20)  POCT Blood Glucose.: 176 mg/dL (11-25-23 @ 05:36)  POCT Blood Glucose.: 221 mg/dL (11-24-23 @ 23:27)  POCT Blood Glucose.: 226 mg/dL (11-24-23 @ 18:23)  POCT Blood Glucose.: 397 mg/dL (11-24-23 @ 12:40)  POCT Blood Glucose.: 267 mg/dL (11-24-23 @ 08:38)  POCT Blood Glucose.: 213 mg/dL (11-24-23 @ 05:47)  POCT Blood Glucose.: 245 mg/dL (11-23-23 @ 23:33)  POCT Blood Glucose.: 294 mg/dL (11-23-23 @ 18:06)  POCT Blood Glucose.: 419 mg/dL (11-23-23 @ 11:36)  POCT Blood Glucose.: 467 mg/dL (11-23-23 @ 11:35)  POCT Blood Glucose.: 288 mg/dL (11-23-23 @ 07:31)  POCT Blood Glucose.: 320 mg/dL (11-23-23 @ 06:10)  POCT Blood Glucose.: 219 mg/dL (11-23-23 @ 00:22)  POCT Blood Glucose.: 273 mg/dL (11-22-23 @ 17:25)                          8.1    7.46  )-----------( 123      ( 25 Nov 2023 07:03 )             27.1       Phos  4.4     11-25  Mg     1.8     11-25 11-25 Chol -- Direct LDL -- LDL calculated -- HDL -- Trig 192<H>  Thyroid Function Tests:  11-10 @ 06:26 TSH 7.90 FreeT4 0.7 T3 -- Anti TPO -- Anti Thyroglobulin Ab -- TSI --  11-09 @ 06:58 TSH 8.90 FreeT4 -- T3 -- Anti TPO -- Anti Thyroglobulin Ab -- TSI --      A1C with Estimated Average Glucose Result: 7.5 % (10-28-23 @ 07:18)    Estimated Average Glucose: 169 mg/dL (10-28-23 @ 07:18)        Diet, NPO with Tube Feed:   Tube Feeding Modality: Gastrostomy  Corous360 glucose support 1.2  Total Volume for 24 Hours (mL): 1176  Continuous  Starting Tube Feed Rate mL per Hour: 56  Increase Tube Feed Rate by (mL): 0  Until Goal Tube Feed Rate (mL per Hour): 56  Tube Feed Duration (in Hours): 21  Tube Feed Start Time: 06:00  Tube Feed Stop Time: 03:00  Free Water Flush  Pump   Rate (mL per Hour): 10   Frequency: Every 2 Hours  Alfred(7 Gm Arginine/7 Gm Glut/1.2 Gm HMB     Qty per Day:  2 (11-24-23 @ 17:37) [Active]

## 2023-11-25 NOTE — PROGRESS NOTE ADULT - ASSESSMENT
74 y/o F with PMHx of T2DM, HTN, HLD, anemia, hypothyroidism, RA, fibromyalgia, remote cerebral aneurysm repair, acute hypercapnic respiratory failure now with tracheostomy, PEG tube, and bedbound (trach change 8/18, PEG change 8/14), sacral pressure ulcer, BIBEMS from home for 6 day hx of bleeding from the tracheostomy and PEG tube with associated abdominal pain, recent history of auditory and visual hallucinations, and with chronic sacral decubitus ulcer. Exam notable for mild abdominal distention with LLQ and RLQ tenderness, tracheostomy in place with no obvious bleeding, PEG tube with scant amount of dried blood, at baseline neurologic status. Imaging showing no active bleeding around tracheostomy site, however was notable for mild thickening along PEG tube tract, sacral ulcer extending to the coccyx suggestive of possible osteomyelitis, and bibasilar patchy lung opacities with volume loss. Patient admitted for respiratory support, wound care of tracheostomy and PEG, and management of sacral osteomyelitis. No further Trach and peg site  bleeding noted since admission.  Pelvic MRI imaging also suggestive of Acute OM. Patient placed on long-term antibiotics with Infectious disease guidance.  Additionally treated with a 7d course of Cefepime due to PSA and Serratia tracheitis on 11/8-->11/15 and then resumed cipro/keflex.  Hospital course c/b Delirium for which Seroquel was added and home Lexapro continued. Tracheostomy changed to #9 Cuffed [place] by ENT 11/3.  Despite trach change patient remained with persistent air leak.  On 11/19, the trach was again changed due to leak and loss of volumes on vent.  Trach was changed to #8 distal cuffed Shiley.  Patient seen by Dermatology for back lesions.  One diagnosed as a seborrheic keratitis and the other a dermatofibroma.     11/23: Cultures NGTD.  No changes to antibiotic regimen made at this time.  Endocrine consulted for DM management. Enteral feeding changed to [employer] diabetic formula. Heme consulted for anemia and thrombocytopenia, follow-up labs.       72 y/o F with PMHx of T2DM, HTN, HLD, anemia, hypothyroidism, RA, fibromyalgia, remote cerebral aneurysm repair, acute hypercapnic respiratory failure now with tracheostomy, PEG tube, and bedbound (trach change 8/18, PEG change 8/14), sacral pressure ulcer, BIBEMS from home for 6 day hx of bleeding from the tracheostomy and PEG tube with associated abdominal pain, recent history of auditory and visual hallucinations, and with chronic sacral decubitus ulcer. Exam notable for mild abdominal distention with LLQ and RLQ tenderness, tracheostomy in place with no obvious bleeding, PEG tube with scant amount of dried blood, at baseline neurologic status. Imaging showing no active bleeding around tracheostomy site, however was notable for mild thickening along PEG tube tract, sacral ulcer extending to the coccyx suggestive of possible osteomyelitis, and bibasilar patchy lung opacities with volume loss. Patient admitted for respiratory support, wound care of tracheostomy and PEG, and management of sacral osteomyelitis. No further Trach and peg site  bleeding noted since admission.  Pelvic MRI imaging also suggestive of Acute OM. Patient placed on long-term antibiotics with Infectious disease guidance.  Additionally treated with a 7d course of Cefepime due to PSA and Serratia tracheitis on 11/8-->11/15 and then resumed cipro/keflex.  Hospital course c/b Delirium for which Seroquel was added and home Lexapro continued. Tracheostomy changed to #9 Cuffed [place] by ENT 11/3.  Despite trach change patient remained with persistent air leak.  On 11/19, the trach was again changed due to leak and loss of volumes on vent.  Trach was changed to #8 distal cuffed [place]iley.  Patient seen by Dermatology for back lesions.  One diagnosed as a seborrheic keratitis and the other a dermatofibroma.     11/23: Cultures NGTD.  No changes to antibiotic regimen made at this time.  Endocrine consulted for DM management. Enteral feeding changed to [employer] diabetic formula. Heme consulted for anemia and thrombocytopenia, follow-up labs.  11/25-no new events       72 y/o F with PMHx of T2DM, HTN, HLD, anemia, hypothyroidism, RA, fibromyalgia, remote cerebral aneurysm repair, acute hypercapnic respiratory failure now with tracheostomy, PEG tube, and bedbound (trach change 8/18, PEG change 8/14), sacral pressure ulcer, BIBEMS from home for 6 day hx of bleeding from the tracheostomy and PEG tube with associated abdominal pain, recent history of auditory and visual hallucinations, and with chronic sacral decubitus ulcer. Exam notable for mild abdominal distention with LLQ and RLQ tenderness, tracheostomy in place with no obvious bleeding, PEG tube with scant amount of dried blood, at baseline neurologic status. Imaging showing no active bleeding around tracheostomy site, however was notable for mild thickening along PEG tube tract, sacral ulcer extending to the coccyx suggestive of possible osteomyelitis, and bibasilar patchy lung opacities with volume loss. Patient admitted for respiratory support, wound care of tracheostomy and PEG, and management of sacral osteomyelitis. No further Trach and peg site  bleeding noted since admission.  Pelvic MRI imaging also suggestive of Acute OM. Patient placed on long-term antibiotics with Infectious disease guidance.  Additionally treated with a 7d course of Cefepime due to PSA and Serratia tracheitis on 11/8-->11/15 and then resumed cipro/keflex.  Hospital course c/b Delirium for which Seroquel was added and home Lexapro continued. Tracheostomy changed to #9 Cuffed [place] by ENT 11/3.  Despite trach change patient remained with persistent air leak.  On 11/19, the trach was again changed due to leak and loss of volumes on vent.  Trach was changed to #8 distal cuffed Shiley.  Patient seen by Dermatology for back lesions.  One diagnosed as a seborrheic keratitis and the other a dermatofibroma.       11/25-no new events.  FS remains elevated, Endocrine aware and adjustments made to insulin regimen.  Patient complained of headache which persisted despite receiving tylenol this morning.  Eventually resolved in early afternoon.  Daughter expresses concerns that patient's hgb has been dropping and of note is 8.1 today and may be related to patient's headache as she was told by a Hematologist at [facility] 1 year ago that patient's hgb should not be allowed to fall below 8.  Will defer decision to transfuse to current hematologist recommendations and clinically asymptomatic.  Patient tolerating PS 15/5 for majority of day.  Mild amount of mucoid secretions recovered during suctioning.

## 2023-11-25 NOTE — PROGRESS NOTE ADULT - SUBJECTIVE AND OBJECTIVE BOX
Patient is a 73y old  Female who presents with a chief complaint of Bleeding from tracheostomy and PEG tube (21 Nov 2023 08:15)      Interval Events:    REVIEW OF SYSTEMS:  [ ] Positive  [ ] All other systems negative  [ ] Unable to assess ROS because ________    Vital Signs Last 24 Hrs  T(C): 36.3 (11-25-23 @ 04:51), Max: 37.3 (11-24-23 @ 23:55)  T(F): 97.3 (11-25-23 @ 04:51), Max: 99.1 (11-24-23 @ 23:55)  HR: 85 (11-25-23 @ 05:50) (82 - 97)  BP: 146/69 (11-25-23 @ 04:51) (114/55 - 146/69)  RR: 16 (11-25-23 @ 04:51) (14 - 20)  SpO2: 100% (11-25-23 @ 05:50) (97% - 100%)    PHYSICAL EXAM:  HEENT:   [ ]Tracheostomy:  [ ]Pupils equal  [ ]No oral lesions  [ ]Abnormal    SKIN  [ ]No Rash  [ ] Abnormal  [ ] pressure    CARDIAC  [ ]Regular  [ ]Abnormal    PULMONARY  [ ]Bilateral Clear Breath Sounds  [ ]Normal Excursion  [ ]Abnormal    GI  [ ]PEG      [ ] +BS		              [ ]Soft, nondistended, nontender	  [ ]Abnormal    MUSCULOSKELETAL                                   [ ]Bedbound                 [ ]Abnormal    [ ]Ambulatory/OOB to chair                           EXTREMITIES                                         [ ]Normal  [ ]Edema                           NEUROLOGIC  [ ] Normal, non focal  [ ] Focal findings:    PSYCHIATRIC  [ ]Alert and appropriate  [ ] Sedated	 [ ]Agitated    :  Reed: [ ] Yes, if yes: Date of Placement:                   [  ] No    LINES: Central Lines [ ] Yes, if yes: Date of Placement                                     [  ] No    HOSPITAL MEDICATIONS:  MEDICATIONS  (STANDING):  albuterol/ipratropium for Nebulization 3 milliLiter(s) Nebulizer every 6 hours  amLODIPine   Tablet 10 milliGRAM(s) Oral daily  artificial  tears Solution 2 Drop(s) Both EYES four times a day  ascorbic acid 500 milliGRAM(s) Oral daily  calcium carbonate    500 mG (Tums) Chewable 1 Tablet(s) Chew every 12 hours  cephalexin 500 milliGRAM(s) Oral every 6 hours  chlorhexidine 0.12% Liquid 15 milliLiter(s) Oral Mucosa every 12 hours  chlorhexidine 4% Liquid 1 Application(s) Topical <User Schedule>  ciprofloxacin   IVPB 400 milliGRAM(s) IV Intermittent every 12 hours  dextrose 50% Injectable 12.5 Gram(s) IV Push once  dextrose 50% Injectable 50 milliLiter(s) IV Push once  dextrose 50% Injectable 25 Gram(s) IV Push once  dextrose 50% Injectable 25 Gram(s) IV Push once  dextrose Oral Gel 15 Gram(s) Oral once  escitalopram 10 milliGRAM(s) Oral <User Schedule>  gabapentin Solution 100 milliGRAM(s) Enteral Tube at bedtime  glucagon  Injectable 1 milliGRAM(s) IntraMuscular once  guaifenesin/dextromethorphan Oral Liquid 10 milliLiter(s) Oral every 6 hours  insulin glargine Injectable (LANTUS) 30 Unit(s) SubCutaneous every morning  insulin lispro (ADMELOG) corrective regimen sliding scale   SubCutaneous every 6 hours  insulin regular  human recombinant 13 Unit(s) SubCutaneous <User Schedule>  levothyroxine 125 MICROGram(s) Oral <User Schedule>  lidocaine   4% Patch 1 Patch Transdermal daily  melatonin 3 milliGRAM(s) Oral at bedtime  multivitamin/minerals/iron Oral Solution (CENTRUM) 15 milliLiter(s) Oral daily  nystatin Powder 1 Application(s) Topical every 8 hours  oxybutynin 5 milliGRAM(s) Oral daily  pantoprazole   Suspension 40 milliGRAM(s) Enteral Tube <User Schedule>  petrolatum Ophthalmic Ointment 1 Application(s) Both EYES four times a day  predniSONE   Tablet 5 milliGRAM(s) Oral daily  QUEtiapine 12.5 milliGRAM(s) Oral <User Schedule>  senna Syrup 15 milliLiter(s) Oral at bedtime  simethicone 80 milliGRAM(s) Chew four times a day  sodium chloride 3%  Inhalation 4 milliLiter(s) Inhalation every 12 hours  triamcinolone 0.1% Ointment 1 Application(s) Topical two times a day  zinc oxide 40% Paste 1 Application(s) Topical daily    MEDICATIONS  (PRN):  acetaminophen   Oral Liquid .. 1000 milliGRAM(s) Enteral Tube every 6 hours PRN Temp greater or equal to 38C (100.4F), Mild Pain (1 - 3)  benzocaine 20% Spray 1 Spray(s) Topical four times a day PRN Cough  diphenhydrAMINE Elixir 25 milliGRAM(s) Oral every 6 hours PRN Rash and/or Itching  sodium chloride 0.65% Nasal 1 Spray(s) Both Nostrils every 6 hours PRN Nasal Congestion      LABS:                  CAPILLARY BLOOD GLUCOSE    MICROBIOLOGY:     RADIOLOGY:  [ ] Reviewed and interpreted by me    Mode: AC/ CMV (Assist Control/ Continuous Mandatory Ventilation)  RR (machine): 12  TV (machine): 300  FiO2: 30  PEEP: 5  ITime: 1  MAP: 10  PIP: 30   Patient is a 73y old  Female who presents with a chief complaint of Bleeding from tracheostomy and PEG tube (21 Nov 2023 08:15)      Interval Events: No events over night    REVIEW OF SYSTEMS:  [X] Positive: left neck pain  [ ] All other systems negative  [ ] Unable to assess ROS because ______    Vital Signs Last 24 Hrs  T(C): 36.3 (11-25-23 @ 04:51), Max: 37.3 (11-24-23 @ 23:55)  T(F): 97.3 (11-25-23 @ 04:51), Max: 99.1 (11-24-23 @ 23:55)  HR: 85 (11-25-23 @ 05:50) (82 - 97)  BP: 146/69 (11-25-23 @ 04:51) (114/55 - 146/69)  RR: 16 (11-25-23 @ 04:51) (14 - 20)  SpO2: 100% (11-25-23 @ 05:50) (97% - 100%)      PHYSICAL EXAM:  HEENT:   [X] Tracheostomy:  #8 distal XLT [place]iley  [X] 4mm PERRL B/L  [X] No oral lesions  [ ] Abnormal    SKIN  [ ] No Rash  [ ]  Abnormal  [X] pressure: Sacral wound    CARDIAC  [X] Regular  [ ] Abnormal    PULMONARY  [X] Bilateral Clear Breath Sounds  [ ] Normal Excursion  [ ] Abnormal    GI  [X] PEG      [X] +BS		              [X] Soft, nondistended, nontender	  [ ] Abnormal    MUSCULOSKELETAL                                   [  ] Bedbound                 [X] Abnormal:  Deconditioned but can partially move all limbs   [ ] Ambulatory/OOB to chair                           EXTREMITIES                                         [X] Normal  [ ]Edema                           NEUROLOGIC  [ ] Normal, non focal  [X] Focal findings:  Alert and Oriented x2; responds appropriately top questions by mouthing; B/L foot drop with partial ability to dorsiflex; moves both arms with minimal hand dexterity B/L    PSYCHIATRIC  [X] Alert; somewhat anxious at times but mostly appropriate  [ ] Sedated	 [ ]Agitated    :  Mcbride: [ ] Yes, if yes: Date of Placement:                   [X] No    LINES: Central Lines [ ] Yes, if yes: Date of Placement                                     [X] No      HOSPITAL MEDICATIONS:  MEDICATIONS  (STANDING):  albuterol/ipratropium for Nebulization 3 milliLiter(s) Nebulizer every 6 hours  amLODIPine   Tablet 10 milliGRAM(s) Oral daily  artificial  tears Solution 2 Drop(s) Both EYES four times a day  ascorbic acid 500 milliGRAM(s) Oral daily  calcium carbonate    500 mG (Tums) Chewable 1 Tablet(s) Chew every 12 hours  cephalexin 500 milliGRAM(s) Oral every 6 hours  chlorhexidine 0.12% Liquid 15 milliLiter(s) Oral Mucosa every 12 hours  chlorhexidine 4% Liquid 1 Application(s) Topical <User Schedule>  ciprofloxacin   IVPB 400 milliGRAM(s) IV Intermittent every 12 hours  dextrose 50% Injectable 12.5 Gram(s) IV Push once  dextrose 50% Injectable 50 milliLiter(s) IV Push once  dextrose 50% Injectable 25 Gram(s) IV Push once  dextrose 50% Injectable 25 Gram(s) IV Push once  dextrose Oral Gel 15 Gram(s) Oral once  escitalopram 10 milliGRAM(s) Oral <User Schedule>  gabapentin Solution 100 milliGRAM(s) Enteral Tube at bedtime  glucagon  Injectable 1 milliGRAM(s) IntraMuscular once  guaifenesin/dextromethorphan Oral Liquid 10 milliLiter(s) Oral every 6 hours  insulin glargine Injectable (LANTUS) 30 Unit(s) SubCutaneous every morning  insulin lispro (ADMELOG) corrective regimen sliding scale   SubCutaneous every 6 hours  insulin regular  human recombinant 13 Unit(s) SubCutaneous <User Schedule>  levothyroxine 125 MICROGram(s) Oral <User Schedule>  lidocaine   4% Patch 1 Patch Transdermal daily  melatonin 3 milliGRAM(s) Oral at bedtime  multivitamin/minerals/iron Oral Solution (CENTRUM) 15 milliLiter(s) Oral daily  nystatin Powder 1 Application(s) Topical every 8 hours  oxybutynin 5 milliGRAM(s) Oral daily  pantoprazole   Suspension 40 milliGRAM(s) Enteral Tube <User Schedule>  petrolatum Ophthalmic Ointment 1 Application(s) Both EYES four times a day  predniSONE   Tablet 5 milliGRAM(s) Oral daily  QUEtiapine 12.5 milliGRAM(s) Oral <User Schedule>  senna Syrup 15 milliLiter(s) Oral at bedtime  simethicone 80 milliGRAM(s) Chew four times a day  sodium chloride 3%  Inhalation 4 milliLiter(s) Inhalation every 12 hours  triamcinolone 0.1% Ointment 1 Application(s) Topical two times a day  zinc oxide 40% Paste 1 Application(s) Topical daily    MEDICATIONS  (PRN):  acetaminophen   Oral Liquid .. 1000 milliGRAM(s) Enteral Tube every 6 hours PRN Temp greater or equal to 38C (100.4F), Mild Pain (1 - 3)  benzocaine 20% Spray 1 Spray(s) Topical four times a day PRN Cough  diphenhydrAMINE Elixir 25 milliGRAM(s) Oral every 6 hours PRN Rash and/or Itching  sodium chloride 0.65% Nasal 1 Spray(s) Both Nostrils every 6 hours PRN Nasal Congestion      LABS:                          8.1    7.46  )-----------( 123      ( 25 Nov 2023 07:03 )             27.1       11-25    135  |  96  |  26<H>  ----------------------------<  173<H>  4.4   |  27  |  <0.30<L>    Ca    9.6      25 Nov 2023 07:06  Phos  4.4     11-25  Mg     1.8     11-25                    CAPILLARY BLOOD GLUCOSE    MICROBIOLOGY:     RADIOLOGY:  [ ] Reviewed and interpreted by me    Mode: AC/ CMV (Assist Control/ Continuous Mandatory Ventilation)  RR (machine): 12  TV (machine): 300  FiO2: 30  PEEP: 5  ITime: 1  MAP: 10  PIP: 30

## 2023-11-25 NOTE — PROGRESS NOTE ADULT - PROBLEM SELECTOR PLAN 9
Lovenox for DVT ppx -- held today in s/o persistent thrombocytopenia _ call hematology consultation in am,     [facility]: Full code  __ long discussion of MICU team with daughter, pt remains FULL CODE, daughter wishes MOLST form reevaluation once pt is less delirious DVT PPx:  Lovenox discontinued given daughter's concern for trach and PEG stoma bleeding/oozing.  Also with mild thrombocytopenia.  Will continue SCDs.     [facility]: Full code  __ long discussion of MICU team with daughter, pt remains FULL CODE, daughter wishes MOLST form reevaluation once pt is less delirious

## 2023-11-26 LAB
ANION GAP SERPL CALC-SCNC: 10 MMOL/L — SIGNIFICANT CHANGE UP (ref 5–17)
ANION GAP SERPL CALC-SCNC: 10 MMOL/L — SIGNIFICANT CHANGE UP (ref 5–17)
APPEARANCE UR: CLEAR — SIGNIFICANT CHANGE UP
APPEARANCE UR: CLEAR — SIGNIFICANT CHANGE UP
BACTERIA # UR AUTO: NEGATIVE /HPF — SIGNIFICANT CHANGE UP
BACTERIA # UR AUTO: NEGATIVE /HPF — SIGNIFICANT CHANGE UP
BILIRUB UR-MCNC: NEGATIVE — SIGNIFICANT CHANGE UP
BILIRUB UR-MCNC: NEGATIVE — SIGNIFICANT CHANGE UP
BLD GP AB SCN SERPL QL: POSITIVE — SIGNIFICANT CHANGE UP
BLD GP AB SCN SERPL QL: POSITIVE — SIGNIFICANT CHANGE UP
BUN SERPL-MCNC: 27 MG/DL — HIGH (ref 7–23)
BUN SERPL-MCNC: 27 MG/DL — HIGH (ref 7–23)
CALCIUM SERPL-MCNC: 9.7 MG/DL — SIGNIFICANT CHANGE UP (ref 8.4–10.5)
CALCIUM SERPL-MCNC: 9.7 MG/DL — SIGNIFICANT CHANGE UP (ref 8.4–10.5)
CAST: 1 /LPF — SIGNIFICANT CHANGE UP (ref 0–4)
CAST: 1 /LPF — SIGNIFICANT CHANGE UP (ref 0–4)
CHLORIDE SERPL-SCNC: 97 MMOL/L — SIGNIFICANT CHANGE UP (ref 96–108)
CHLORIDE SERPL-SCNC: 97 MMOL/L — SIGNIFICANT CHANGE UP (ref 96–108)
CO2 SERPL-SCNC: 28 MMOL/L — SIGNIFICANT CHANGE UP (ref 22–31)
CO2 SERPL-SCNC: 28 MMOL/L — SIGNIFICANT CHANGE UP (ref 22–31)
COLOR SPEC: YELLOW — SIGNIFICANT CHANGE UP
COLOR SPEC: YELLOW — SIGNIFICANT CHANGE UP
CREAT SERPL-MCNC: <0.3 MG/DL — LOW (ref 0.5–1.3)
CREAT SERPL-MCNC: <0.3 MG/DL — LOW (ref 0.5–1.3)
DAT C3-SP REAG RBC QL: NEGATIVE — SIGNIFICANT CHANGE UP
DAT C3-SP REAG RBC QL: NEGATIVE — SIGNIFICANT CHANGE UP
DIFF PNL FLD: NEGATIVE — SIGNIFICANT CHANGE UP
DIFF PNL FLD: NEGATIVE — SIGNIFICANT CHANGE UP
EGFR: 113 ML/MIN/1.73M2 — SIGNIFICANT CHANGE UP
EGFR: 113 ML/MIN/1.73M2 — SIGNIFICANT CHANGE UP
GLUCOSE BLDC GLUCOMTR-MCNC: 139 MG/DL — HIGH (ref 70–99)
GLUCOSE BLDC GLUCOMTR-MCNC: 139 MG/DL — HIGH (ref 70–99)
GLUCOSE BLDC GLUCOMTR-MCNC: 166 MG/DL — HIGH (ref 70–99)
GLUCOSE BLDC GLUCOMTR-MCNC: 166 MG/DL — HIGH (ref 70–99)
GLUCOSE BLDC GLUCOMTR-MCNC: 181 MG/DL — HIGH (ref 70–99)
GLUCOSE BLDC GLUCOMTR-MCNC: 181 MG/DL — HIGH (ref 70–99)
GLUCOSE BLDC GLUCOMTR-MCNC: 222 MG/DL — HIGH (ref 70–99)
GLUCOSE BLDC GLUCOMTR-MCNC: 222 MG/DL — HIGH (ref 70–99)
GLUCOSE BLDC GLUCOMTR-MCNC: 224 MG/DL — HIGH (ref 70–99)
GLUCOSE BLDC GLUCOMTR-MCNC: 224 MG/DL — HIGH (ref 70–99)
GLUCOSE BLDC GLUCOMTR-MCNC: 90 MG/DL — SIGNIFICANT CHANGE UP (ref 70–99)
GLUCOSE BLDC GLUCOMTR-MCNC: 90 MG/DL — SIGNIFICANT CHANGE UP (ref 70–99)
GLUCOSE SERPL-MCNC: 122 MG/DL — HIGH (ref 70–99)
GLUCOSE SERPL-MCNC: 122 MG/DL — HIGH (ref 70–99)
GLUCOSE UR QL: NEGATIVE MG/DL — SIGNIFICANT CHANGE UP
GLUCOSE UR QL: NEGATIVE MG/DL — SIGNIFICANT CHANGE UP
GRAM STN FLD: ABNORMAL
GRAM STN FLD: ABNORMAL
HCT VFR BLD CALC: 25.6 % — LOW (ref 34.5–45)
HCT VFR BLD CALC: 25.6 % — LOW (ref 34.5–45)
HCT VFR BLD CALC: 26.6 % — LOW (ref 34.5–45)
HCT VFR BLD CALC: 26.6 % — LOW (ref 34.5–45)
HGB BLD-MCNC: 7.5 G/DL — LOW (ref 11.5–15.5)
HGB BLD-MCNC: 7.5 G/DL — LOW (ref 11.5–15.5)
HGB BLD-MCNC: 7.9 G/DL — LOW (ref 11.5–15.5)
HGB BLD-MCNC: 7.9 G/DL — LOW (ref 11.5–15.5)
KETONES UR-MCNC: ABNORMAL MG/DL
KETONES UR-MCNC: ABNORMAL MG/DL
LEUKOCYTE ESTERASE UR-ACNC: ABNORMAL
LEUKOCYTE ESTERASE UR-ACNC: ABNORMAL
MCHC RBC-ENTMCNC: 27.5 PG — SIGNIFICANT CHANGE UP (ref 27–34)
MCHC RBC-ENTMCNC: 27.5 PG — SIGNIFICANT CHANGE UP (ref 27–34)
MCHC RBC-ENTMCNC: 27.6 PG — SIGNIFICANT CHANGE UP (ref 27–34)
MCHC RBC-ENTMCNC: 27.6 PG — SIGNIFICANT CHANGE UP (ref 27–34)
MCHC RBC-ENTMCNC: 29.3 GM/DL — LOW (ref 32–36)
MCHC RBC-ENTMCNC: 29.3 GM/DL — LOW (ref 32–36)
MCHC RBC-ENTMCNC: 29.7 GM/DL — LOW (ref 32–36)
MCHC RBC-ENTMCNC: 29.7 GM/DL — LOW (ref 32–36)
MCV RBC AUTO: 93 FL — SIGNIFICANT CHANGE UP (ref 80–100)
MCV RBC AUTO: 93 FL — SIGNIFICANT CHANGE UP (ref 80–100)
MCV RBC AUTO: 93.8 FL — SIGNIFICANT CHANGE UP (ref 80–100)
MCV RBC AUTO: 93.8 FL — SIGNIFICANT CHANGE UP (ref 80–100)
NITRITE UR-MCNC: NEGATIVE — SIGNIFICANT CHANGE UP
NITRITE UR-MCNC: NEGATIVE — SIGNIFICANT CHANGE UP
NRBC # BLD: 0 /100 WBCS — SIGNIFICANT CHANGE UP (ref 0–0)
PH UR: 7 — SIGNIFICANT CHANGE UP (ref 5–8)
PH UR: 7 — SIGNIFICANT CHANGE UP (ref 5–8)
PLATELET # BLD AUTO: 133 K/UL — LOW (ref 150–400)
PLATELET # BLD AUTO: 133 K/UL — LOW (ref 150–400)
PLATELET # BLD AUTO: 134 K/UL — LOW (ref 150–400)
PLATELET # BLD AUTO: 134 K/UL — LOW (ref 150–400)
POTASSIUM SERPL-MCNC: 3.9 MMOL/L — SIGNIFICANT CHANGE UP (ref 3.5–5.3)
POTASSIUM SERPL-MCNC: 3.9 MMOL/L — SIGNIFICANT CHANGE UP (ref 3.5–5.3)
POTASSIUM SERPL-SCNC: 3.9 MMOL/L — SIGNIFICANT CHANGE UP (ref 3.5–5.3)
POTASSIUM SERPL-SCNC: 3.9 MMOL/L — SIGNIFICANT CHANGE UP (ref 3.5–5.3)
PROT UR-MCNC: NEGATIVE MG/DL — SIGNIFICANT CHANGE UP
PROT UR-MCNC: NEGATIVE MG/DL — SIGNIFICANT CHANGE UP
RBC # BLD: 2.73 M/UL — LOW (ref 3.8–5.2)
RBC # BLD: 2.73 M/UL — LOW (ref 3.8–5.2)
RBC # BLD: 2.86 M/UL — LOW (ref 3.8–5.2)
RBC # BLD: 2.86 M/UL — LOW (ref 3.8–5.2)
RBC # FLD: 17.2 % — HIGH (ref 10.3–14.5)
RBC # FLD: 17.2 % — HIGH (ref 10.3–14.5)
RBC # FLD: 17.5 % — HIGH (ref 10.3–14.5)
RBC # FLD: 17.5 % — HIGH (ref 10.3–14.5)
RBC CASTS # UR COMP ASSIST: 1 /HPF — SIGNIFICANT CHANGE UP (ref 0–4)
RBC CASTS # UR COMP ASSIST: 1 /HPF — SIGNIFICANT CHANGE UP (ref 0–4)
REVIEW: SIGNIFICANT CHANGE UP
REVIEW: SIGNIFICANT CHANGE UP
RH IG SCN BLD-IMP: POSITIVE — SIGNIFICANT CHANGE UP
RH IG SCN BLD-IMP: POSITIVE — SIGNIFICANT CHANGE UP
SODIUM SERPL-SCNC: 135 MMOL/L — SIGNIFICANT CHANGE UP (ref 135–145)
SODIUM SERPL-SCNC: 135 MMOL/L — SIGNIFICANT CHANGE UP (ref 135–145)
SP GR SPEC: 1.01 — SIGNIFICANT CHANGE UP (ref 1–1.03)
SP GR SPEC: 1.01 — SIGNIFICANT CHANGE UP (ref 1–1.03)
SPECIMEN SOURCE: SIGNIFICANT CHANGE UP
SPECIMEN SOURCE: SIGNIFICANT CHANGE UP
SQUAMOUS # UR AUTO: 1 /HPF — SIGNIFICANT CHANGE UP (ref 0–5)
SQUAMOUS # UR AUTO: 1 /HPF — SIGNIFICANT CHANGE UP (ref 0–5)
UROBILINOGEN FLD QL: 0.2 MG/DL — SIGNIFICANT CHANGE UP (ref 0.2–1)
UROBILINOGEN FLD QL: 0.2 MG/DL — SIGNIFICANT CHANGE UP (ref 0.2–1)
WBC # BLD: 7.48 K/UL — SIGNIFICANT CHANGE UP (ref 3.8–10.5)
WBC # BLD: 7.48 K/UL — SIGNIFICANT CHANGE UP (ref 3.8–10.5)
WBC # BLD: 8.24 K/UL — SIGNIFICANT CHANGE UP (ref 3.8–10.5)
WBC # BLD: 8.24 K/UL — SIGNIFICANT CHANGE UP (ref 3.8–10.5)
WBC # FLD AUTO: 7.48 K/UL — SIGNIFICANT CHANGE UP (ref 3.8–10.5)
WBC # FLD AUTO: 7.48 K/UL — SIGNIFICANT CHANGE UP (ref 3.8–10.5)
WBC # FLD AUTO: 8.24 K/UL — SIGNIFICANT CHANGE UP (ref 3.8–10.5)
WBC # FLD AUTO: 8.24 K/UL — SIGNIFICANT CHANGE UP (ref 3.8–10.5)
WBC UR QL: 2 /HPF — SIGNIFICANT CHANGE UP (ref 0–5)
WBC UR QL: 2 /HPF — SIGNIFICANT CHANGE UP (ref 0–5)

## 2023-11-26 PROCEDURE — 86077 PHYS BLOOD BANK SERV XMATCH: CPT

## 2023-11-26 PROCEDURE — 99232 SBSQ HOSP IP/OBS MODERATE 35: CPT

## 2023-11-26 RX ADMIN — LIDOCAINE 1 PATCH: 4 CREAM TOPICAL at 00:26

## 2023-11-26 RX ADMIN — NYSTATIN CREAM 1 APPLICATION(S): 100000 CREAM TOPICAL at 07:00

## 2023-11-26 RX ADMIN — Medication 125 MICROGRAM(S): at 07:01

## 2023-11-26 RX ADMIN — GABAPENTIN 100 MILLIGRAM(S): 400 CAPSULE ORAL at 22:18

## 2023-11-26 RX ADMIN — Medication 1 TABLET(S): at 17:04

## 2023-11-26 RX ADMIN — CHLORHEXIDINE GLUCONATE 1 APPLICATION(S): 213 SOLUTION TOPICAL at 06:59

## 2023-11-26 RX ADMIN — Medication 500 MILLIGRAM(S): at 12:41

## 2023-11-26 RX ADMIN — Medication 3 MILLILITER(S): at 12:46

## 2023-11-26 RX ADMIN — Medication 200 MILLIGRAM(S): at 07:01

## 2023-11-26 RX ADMIN — INSULIN HUMAN 17 UNIT(S): 100 INJECTION, SOLUTION SUBCUTANEOUS at 12:40

## 2023-11-26 RX ADMIN — Medication 500 MILLIGRAM(S): at 17:04

## 2023-11-26 RX ADMIN — Medication 2 DROP(S): at 07:00

## 2023-11-26 RX ADMIN — QUETIAPINE FUMARATE 12.5 MILLIGRAM(S): 200 TABLET, FILM COATED ORAL at 17:10

## 2023-11-26 RX ADMIN — Medication 3 MILLILITER(S): at 05:48

## 2023-11-26 RX ADMIN — SIMETHICONE 80 MILLIGRAM(S): 80 TABLET, CHEWABLE ORAL at 12:44

## 2023-11-26 RX ADMIN — Medication 15 MILLILITER(S): at 12:45

## 2023-11-26 RX ADMIN — Medication 5 MILLIGRAM(S): at 12:44

## 2023-11-26 RX ADMIN — Medication 2: at 00:21

## 2023-11-26 RX ADMIN — LIDOCAINE 1 PATCH: 4 CREAM TOPICAL at 19:27

## 2023-11-26 RX ADMIN — Medication 2 DROP(S): at 12:41

## 2023-11-26 RX ADMIN — Medication 500 MILLIGRAM(S): at 12:42

## 2023-11-26 RX ADMIN — INSULIN GLARGINE 30 UNIT(S): 100 INJECTION, SOLUTION SUBCUTANEOUS at 08:26

## 2023-11-26 RX ADMIN — SIMETHICONE 80 MILLIGRAM(S): 80 TABLET, CHEWABLE ORAL at 07:02

## 2023-11-26 RX ADMIN — INSULIN HUMAN 17 UNIT(S): 100 INJECTION, SOLUTION SUBCUTANEOUS at 06:59

## 2023-11-26 RX ADMIN — Medication 3 MILLILITER(S): at 17:52

## 2023-11-26 RX ADMIN — NYSTATIN CREAM 1 APPLICATION(S): 100000 CREAM TOPICAL at 10:02

## 2023-11-26 RX ADMIN — Medication 3 MILLILITER(S): at 23:40

## 2023-11-26 RX ADMIN — Medication 1 APPLICATION(S): at 17:06

## 2023-11-26 RX ADMIN — SIMETHICONE 80 MILLIGRAM(S): 80 TABLET, CHEWABLE ORAL at 17:05

## 2023-11-26 RX ADMIN — NYSTATIN CREAM 1 APPLICATION(S): 100000 CREAM TOPICAL at 22:18

## 2023-11-26 RX ADMIN — Medication 5 MILLIGRAM(S): at 07:01

## 2023-11-26 RX ADMIN — Medication 1 APPLICATION(S): at 07:02

## 2023-11-26 RX ADMIN — CHLORHEXIDINE GLUCONATE 15 MILLILITER(S): 213 SOLUTION TOPICAL at 07:00

## 2023-11-26 RX ADMIN — AMLODIPINE BESYLATE 10 MILLIGRAM(S): 2.5 TABLET ORAL at 07:01

## 2023-11-26 RX ADMIN — Medication 200 MILLIGRAM(S): at 17:05

## 2023-11-26 RX ADMIN — ESCITALOPRAM OXALATE 10 MILLIGRAM(S): 10 TABLET, FILM COATED ORAL at 07:04

## 2023-11-26 RX ADMIN — Medication 2 DROP(S): at 17:06

## 2023-11-26 RX ADMIN — SODIUM CHLORIDE 4 MILLILITER(S): 9 INJECTION INTRAMUSCULAR; INTRAVENOUS; SUBCUTANEOUS at 05:48

## 2023-11-26 RX ADMIN — Medication 1000 MILLIGRAM(S): at 08:50

## 2023-11-26 RX ADMIN — Medication 3 MILLIGRAM(S): at 22:18

## 2023-11-26 RX ADMIN — Medication 500 MILLIGRAM(S): at 07:01

## 2023-11-26 RX ADMIN — ZINC OXIDE 1 APPLICATION(S): 200 OINTMENT TOPICAL at 11:30

## 2023-11-26 RX ADMIN — CHLORHEXIDINE GLUCONATE 15 MILLILITER(S): 213 SOLUTION TOPICAL at 17:10

## 2023-11-26 RX ADMIN — LIDOCAINE 1 PATCH: 4 CREAM TOPICAL at 12:45

## 2023-11-26 RX ADMIN — Medication 1 APPLICATION(S): at 12:41

## 2023-11-26 RX ADMIN — Medication 1 APPLICATION(S): at 07:00

## 2023-11-26 RX ADMIN — Medication 25 MILLIGRAM(S): at 18:07

## 2023-11-26 RX ADMIN — SODIUM CHLORIDE 4 MILLILITER(S): 9 INJECTION INTRAMUSCULAR; INTRAVENOUS; SUBCUTANEOUS at 17:00

## 2023-11-26 RX ADMIN — Medication 1000 MILLIGRAM(S): at 07:00

## 2023-11-26 RX ADMIN — INSULIN HUMAN 17 UNIT(S): 100 INJECTION, SOLUTION SUBCUTANEOUS at 18:24

## 2023-11-26 RX ADMIN — Medication 1 TABLET(S): at 07:02

## 2023-11-26 RX ADMIN — Medication 2: at 12:40

## 2023-11-26 RX ADMIN — SENNA PLUS 15 MILLILITER(S): 8.6 TABLET ORAL at 22:18

## 2023-11-26 RX ADMIN — Medication 4: at 18:08

## 2023-11-26 RX ADMIN — PANTOPRAZOLE SODIUM 40 MILLIGRAM(S): 20 TABLET, DELAYED RELEASE ORAL at 07:01

## 2023-11-26 NOTE — PROGRESS NOTE ADULT - SUBJECTIVE AND OBJECTIVE BOX
Patient is a 73y old  Female who presents with a chief complaint of Bleeding from tracheostomy and PEG tube (21 Nov 2023 08:15)      Interval Events: No events over night    REVIEW OF SYSTEMS:  [X] Positive: left neck pain  [ ] All other systems negative  [ ] Unable to assess ROS because ______    Vital Signs Last 24 Hrs  T(C): 36.3 (11-25-23 @ 04:51), Max: 37.3 (11-24-23 @ 23:55)  T(F): 97.3 (11-25-23 @ 04:51), Max: 99.1 (11-24-23 @ 23:55)  HR: 85 (11-25-23 @ 05:50) (82 - 97)  BP: 146/69 (11-25-23 @ 04:51) (114/55 - 146/69)  RR: 16 (11-25-23 @ 04:51) (14 - 20)  SpO2: 100% (11-25-23 @ 05:50) (97% - 100%)      PHYSICAL EXAM:  HEENT:   [X] Tracheostomy:  #8 distal XLT Shiley  [X] 4mm PERRL B/L  [X] No oral lesions  [ ] Abnormal    SKIN  [ ] No Rash  [ ]  Abnormal  [X] pressure: Sacral wound    CARDIAC  [X] Regular  [ ] Abnormal    PULMONARY  [X] Bilateral Clear Breath Sounds  [ ] Normal Excursion  [ ] Abnormal    GI  [X] PEG      [X] +BS		              [X] Soft, nondistended, nontender	  [ ] Abnormal    MUSCULOSKELETAL                                   [  ] Bedbound                 [X] Abnormal:  Deconditioned but can partially move all limbs   [ ] Ambulatory/OOB to chair                           EXTREMITIES                                         [X] Normal  [ ]Edema                           NEUROLOGIC  [ ] Normal, non focal  [X] Focal findings:  Alert and Oriented x2; responds appropriately top questions by mouthing; B/L foot drop with partial ability to dorsiflex; moves both arms with minimal hand dexterity B/L    PSYCHIATRIC  [X] Alert; somewhat anxious at times but mostly appropriate  [ ] Sedated	 [ ]Agitated    :  Mcbride: [ ] Yes, if yes: Date of Placement:                   [X] No    LINES: Central Lines [ ] Yes, if yes: Date of Placement                                     [X] No      HOSPITAL MEDICATIONS:  MEDICATIONS  (STANDING):  albuterol/ipratropium for Nebulization 3 milliLiter(s) Nebulizer every 6 hours  amLODIPine   Tablet 10 milliGRAM(s) Oral daily  artificial  tears Solution 2 Drop(s) Both EYES four times a day  ascorbic acid 500 milliGRAM(s) Oral daily  calcium carbonate    500 mG (Tums) Chewable 1 Tablet(s) Chew every 12 hours  cephalexin 500 milliGRAM(s) Oral every 6 hours  chlorhexidine 0.12% Liquid 15 milliLiter(s) Oral Mucosa every 12 hours  chlorhexidine 4% Liquid 1 Application(s) Topical <User Schedule>  ciprofloxacin   IVPB 400 milliGRAM(s) IV Intermittent every 12 hours  dextrose 50% Injectable 12.5 Gram(s) IV Push once  dextrose 50% Injectable 50 milliLiter(s) IV Push once  dextrose 50% Injectable 25 Gram(s) IV Push once  dextrose 50% Injectable 25 Gram(s) IV Push once  dextrose Oral Gel 15 Gram(s) Oral once  escitalopram 10 milliGRAM(s) Oral <User Schedule>  gabapentin Solution 100 milliGRAM(s) Enteral Tube at bedtime  glucagon  Injectable 1 milliGRAM(s) IntraMuscular once  guaifenesin/dextromethorphan Oral Liquid 10 milliLiter(s) Oral every 6 hours  insulin glargine Injectable (LANTUS) 30 Unit(s) SubCutaneous every morning  insulin lispro (ADMELOG) corrective regimen sliding scale   SubCutaneous every 6 hours  insulin regular  human recombinant 13 Unit(s) SubCutaneous <User Schedule>  levothyroxine 125 MICROGram(s) Oral <User Schedule>  lidocaine   4% Patch 1 Patch Transdermal daily  melatonin 3 milliGRAM(s) Oral at bedtime  multivitamin/minerals/iron Oral Solution (CENTRUM) 15 milliLiter(s) Oral daily  nystatin Powder 1 Application(s) Topical every 8 hours  oxybutynin 5 milliGRAM(s) Oral daily  pantoprazole   Suspension 40 milliGRAM(s) Enteral Tube <User Schedule>  petrolatum Ophthalmic Ointment 1 Application(s) Both EYES four times a day  predniSONE   Tablet 5 milliGRAM(s) Oral daily  QUEtiapine 12.5 milliGRAM(s) Oral <User Schedule>  senna Syrup 15 milliLiter(s) Oral at bedtime  simethicone 80 milliGRAM(s) Chew four times a day  sodium chloride 3%  Inhalation 4 milliLiter(s) Inhalation every 12 hours  triamcinolone 0.1% Ointment 1 Application(s) Topical two times a day  zinc oxide 40% Paste 1 Application(s) Topical daily    MEDICATIONS  (PRN):  acetaminophen   Oral Liquid .. 1000 milliGRAM(s) Enteral Tube every 6 hours PRN Temp greater or equal to 38C (100.4F), Mild Pain (1 - 3)  benzocaine 20% Spray 1 Spray(s) Topical four times a day PRN Cough  diphenhydrAMINE Elixir 25 milliGRAM(s) Oral every 6 hours PRN Rash and/or Itching  sodium chloride 0.65% Nasal 1 Spray(s) Both Nostrils every 6 hours PRN Nasal Congestion      LABS:                          8.1    7.46  )-----------( 123      ( 25 Nov 2023 07:03 )             27.1       11-25    135  |  96  |  26<H>  ----------------------------<  173<H>  4.4   |  27  |  <0.30<L>    Ca    9.6      25 Nov 2023 07:06  Phos  4.4     11-25  Mg     1.8     11-25                    CAPILLARY BLOOD GLUCOSE    MICROBIOLOGY:     RADIOLOGY:  [ ] Reviewed and interpreted by me    Mode: AC/ CMV (Assist Control/ Continuous Mandatory Ventilation)  RR (machine): 12  TV (machine): 300  FiO2: 30  PEEP: 5  ITime: 1  MAP: 10  PIP: 30   Patient is a 73y old  Female who presents with a chief complaint of Bleeding from tracheostomy and PEG tube (21 Nov 2023 08:15)      Interval Events:  T 100.7  cxs done t    REVIEW OF SYSTEMS:  [X] Positive: left neck pain  [ ] All other systems negative  [ ] Unable to assess ROS because ______    Vital Signs Last 24 Hrs  T(C): 36.3 (11-25-23 @ 04:51), Max: 37.3 (11-24-23 @ 23:55)  T(F): 97.3 (11-25-23 @ 04:51), Max: 99.1 (11-24-23 @ 23:55)  HR: 85 (11-25-23 @ 05:50) (82 - 97)  BP: 146/69 (11-25-23 @ 04:51) (114/55 - 146/69)  RR: 16 (11-25-23 @ 04:51) (14 - 20)  SpO2: 100% (11-25-23 @ 05:50) (97% - 100%)      PHYSICAL EXAM:  HEENT:   [X] Tracheostomy:  #8 distal XLT Shiley  [X] 4mm PERRL B/L  [X] No oral lesions  [ ] Abnormal    SKIN  [ ] No Rash  [ ]  Abnormal  [X] pressure: Sacral wound    CARDIAC  [X] Regular  [ ] Abnormal    PULMONARY  [X] Bilateral Clear Breath Sounds  [ ] Normal Excursion  [ ] Abnormal    GI  [X] PEG      [X] +BS		              [X] Soft, nondistended, nontender	  [ ] Abnormal    MUSCULOSKELETAL                                   [  ] Bedbound                 [X] Abnormal:  Deconditioned but can partially move all limbs   [ ] Ambulatory/OOB to chair                           EXTREMITIES                                         [X] Normal  [ ]Edema                           NEUROLOGIC  [ ] Normal, non focal  [X] Focal findings:  Alert and Oriented x2; responds appropriately top questions by mouthing; B/L foot drop with partial ability to dorsiflex; moves both arms with minimal hand dexterity B/L    PSYCHIATRIC  [X] Alert; somewhat anxious at times but mostly appropriate  [ ] Sedated	 [ ]Agitated    :  Reed: [ ] Yes, if yes: Date of Placement:                   [X] No    LINES: Central Lines [ ] Yes, if yes: Date of Placement                                     [X] No      HOSPITAL MEDICATIONS:  MEDICATIONS  (STANDING):  albuterol/ipratropium for Nebulization 3 milliLiter(s) Nebulizer every 6 hours  amLODIPine   Tablet 10 milliGRAM(s) Oral daily  artificial  tears Solution 2 Drop(s) Both EYES four times a day  ascorbic acid 500 milliGRAM(s) Oral daily  calcium carbonate    500 mG (Tums) Chewable 1 Tablet(s) Chew every 12 hours  cephalexin 500 milliGRAM(s) Oral every 6 hours  chlorhexidine 0.12% Liquid 15 milliLiter(s) Oral Mucosa every 12 hours  chlorhexidine 4% Liquid 1 Application(s) Topical <User Schedule>  ciprofloxacin   IVPB 400 milliGRAM(s) IV Intermittent every 12 hours  dextrose 50% Injectable 12.5 Gram(s) IV Push once  dextrose 50% Injectable 50 milliLiter(s) IV Push once  dextrose 50% Injectable 25 Gram(s) IV Push once  dextrose 50% Injectable 25 Gram(s) IV Push once  dextrose Oral Gel 15 Gram(s) Oral once  escitalopram 10 milliGRAM(s) Oral <User Schedule>  gabapentin Solution 100 milliGRAM(s) Enteral Tube at bedtime  glucagon  Injectable 1 milliGRAM(s) IntraMuscular once  guaifenesin/dextromethorphan Oral Liquid 10 milliLiter(s) Oral every 6 hours  insulin glargine Injectable (LANTUS) 30 Unit(s) SubCutaneous every morning  insulin lispro (ADMELOG) corrective regimen sliding scale   SubCutaneous every 6 hours  insulin regular  human recombinant 13 Unit(s) SubCutaneous <User Schedule>  levothyroxine 125 MICROGram(s) Oral <User Schedule>  lidocaine   4% Patch 1 Patch Transdermal daily  melatonin 3 milliGRAM(s) Oral at bedtime  multivitamin/minerals/iron Oral Solution (CENTRUM) 15 milliLiter(s) Oral daily  nystatin Powder 1 Application(s) Topical every 8 hours  oxybutynin 5 milliGRAM(s) Oral daily  pantoprazole   Suspension 40 milliGRAM(s) Enteral Tube <User Schedule>  petrolatum Ophthalmic Ointment 1 Application(s) Both EYES four times a day  predniSONE   Tablet 5 milliGRAM(s) Oral daily  QUEtiapine 12.5 milliGRAM(s) Oral <User Schedule>  senna Syrup 15 milliLiter(s) Oral at bedtime  simethicone 80 milliGRAM(s) Chew four times a day  sodium chloride 3%  Inhalation 4 milliLiter(s) Inhalation every 12 hours  triamcinolone 0.1% Ointment 1 Application(s) Topical two times a day  zinc oxide 40% Paste 1 Application(s) Topical daily    MEDICATIONS  (PRN):  acetaminophen   Oral Liquid .. 1000 milliGRAM(s) Enteral Tube every 6 hours PRN Temp greater or equal to 38C (100.4F), Mild Pain (1 - 3)  benzocaine 20% Spray 1 Spray(s) Topical four times a day PRN Cough  diphenhydrAMINE Elixir 25 milliGRAM(s) Oral every 6 hours PRN Rash and/or Itching  sodium chloride 0.65% Nasal 1 Spray(s) Both Nostrils every 6 hours PRN Nasal Congestion      LABS:                          8.1    7.46  )-----------( 123      ( 25 Nov 2023 07:03 )             27.1       11-25    135  |  96  |  26<H>  ----------------------------<  173<H>  4.4   |  27  |  <0.30<L>    Ca    9.6      25 Nov 2023 07:06  Phos  4.4     11-25  Mg     1.8     11-25                    CAPILLARY BLOOD GLUCOSE    MICROBIOLOGY:     RADIOLOGY:  [ ] Reviewed and interpreted by me    Mode: AC/ CMV (Assist Control/ Continuous Mandatory Ventilation)  RR (machine): 12  TV (machine): 300  FiO2: 30  PEEP: 5  ITime: 1  MAP: 10  PIP: 30   Patient is a 73y old  Female who presents with a chief complaint of Bleeding from tracheostomy and PEG tube (21 Nov 2023 08:15)      Interval Events:  T 100.7  cxs done     REVIEW OF SYSTEMS:  [X] Positive: left neck pain  [ ] All other systems negative  [ ] Unable to assess ROS because ______    Vital Signs Last 24 Hrs  T(C): 36.3 (11-25-23 @ 04:51), Max: 37.3 (11-24-23 @ 23:55)  T(F): 97.3 (11-25-23 @ 04:51), Max: 99.1 (11-24-23 @ 23:55)  HR: 85 (11-25-23 @ 05:50) (82 - 97)  BP: 146/69 (11-25-23 @ 04:51) (114/55 - 146/69)  RR: 16 (11-25-23 @ 04:51) (14 - 20)  SpO2: 100% (11-25-23 @ 05:50) (97% - 100%)      PHYSICAL EXAM:  HEENT:   [X] Tracheostomy:  #8 distal XLT Shiley  [X] 4mm PERRL B/L  [X] No oral lesions  [ ] Abnormal    SKIN  [ ] No Rash  [ ]  Abnormal  [X] pressure: Sacral wound    CARDIAC  [X] Regular  [ ] Abnormal    PULMONARY  [X] Bilateral Clear Breath Sounds  [ ] Normal Excursion  [ ] Abnormal    GI  [X] PEG      [X] +BS		              [X] Soft, nondistended, nontender	  [ ] Abnormal    MUSCULOSKELETAL                                   [  ] Bedbound                 [X] Abnormal:  Deconditioned but can partially move all limbs   [ ] Ambulatory/OOB to chair                           EXTREMITIES                                         [X] Normal  [ ]Edema                           NEUROLOGIC  [ ] Normal, non focal  [X] Focal findings:  Alert and Oriented x2; responds appropriately top questions by mouthing; B/L foot drop with partial ability to dorsiflex; moves both arms with minimal hand dexterity B/L    PSYCHIATRIC  [X] Alert; somewhat anxious at times but mostly appropriate  [ ] Sedated	 [ ]Agitated    :  Mcbride: [ ] Yes, if yes: Date of Placement:                   [X] No    LINES: Central Lines [ ] Yes, if yes: Date of Placement                                     [X] No      HOSPITAL MEDICATIONS:  MEDICATIONS  (STANDING):  albuterol/ipratropium for Nebulization 3 milliLiter(s) Nebulizer every 6 hours  amLODIPine   Tablet 10 milliGRAM(s) Oral daily  artificial  tears Solution 2 Drop(s) Both EYES four times a day  ascorbic acid 500 milliGRAM(s) Oral daily  calcium carbonate    500 mG (Tums) Chewable 1 Tablet(s) Chew every 12 hours  cephalexin 500 milliGRAM(s) Oral every 6 hours  chlorhexidine 0.12% Liquid 15 milliLiter(s) Oral Mucosa every 12 hours  chlorhexidine 4% Liquid 1 Application(s) Topical <User Schedule>  ciprofloxacin   IVPB 400 milliGRAM(s) IV Intermittent every 12 hours  dextrose 50% Injectable 12.5 Gram(s) IV Push once  dextrose 50% Injectable 50 milliLiter(s) IV Push once  dextrose 50% Injectable 25 Gram(s) IV Push once  dextrose 50% Injectable 25 Gram(s) IV Push once  dextrose Oral Gel 15 Gram(s) Oral once  escitalopram 10 milliGRAM(s) Oral <User Schedule>  gabapentin Solution 100 milliGRAM(s) Enteral Tube at bedtime  glucagon  Injectable 1 milliGRAM(s) IntraMuscular once  guaifenesin/dextromethorphan Oral Liquid 10 milliLiter(s) Oral every 6 hours  insulin glargine Injectable (LANTUS) 30 Unit(s) SubCutaneous every morning  insulin lispro (ADMELOG) corrective regimen sliding scale   SubCutaneous every 6 hours  insulin regular  human recombinant 13 Unit(s) SubCutaneous <User Schedule>  levothyroxine 125 MICROGram(s) Oral <User Schedule>  lidocaine   4% Patch 1 Patch Transdermal daily  melatonin 3 milliGRAM(s) Oral at bedtime  multivitamin/minerals/iron Oral Solution (CENTRUM) 15 milliLiter(s) Oral daily  nystatin Powder 1 Application(s) Topical every 8 hours  oxybutynin 5 milliGRAM(s) Oral daily  pantoprazole   Suspension 40 milliGRAM(s) Enteral Tube <User Schedule>  petrolatum Ophthalmic Ointment 1 Application(s) Both EYES four times a day  predniSONE   Tablet 5 milliGRAM(s) Oral daily  QUEtiapine 12.5 milliGRAM(s) Oral <User Schedule>  senna Syrup 15 milliLiter(s) Oral at bedtime  simethicone 80 milliGRAM(s) Chew four times a day  sodium chloride 3%  Inhalation 4 milliLiter(s) Inhalation every 12 hours  triamcinolone 0.1% Ointment 1 Application(s) Topical two times a day  zinc oxide 40% Paste 1 Application(s) Topical daily    MEDICATIONS  (PRN):  acetaminophen   Oral Liquid .. 1000 milliGRAM(s) Enteral Tube every 6 hours PRN Temp greater or equal to 38C (100.4F), Mild Pain (1 - 3)  benzocaine 20% Spray 1 Spray(s) Topical four times a day PRN Cough  diphenhydrAMINE Elixir 25 milliGRAM(s) Oral every 6 hours PRN Rash and/or Itching  sodium chloride 0.65% Nasal 1 Spray(s) Both Nostrils every 6 hours PRN Nasal Congestion      LABS:                          8.1    7.46  )-----------( 123      ( 25 Nov 2023 07:03 )             27.1       11-25    135  |  96  |  26<H>  ----------------------------<  173<H>  4.4   |  27  |  <0.30<L>    Ca    9.6      25 Nov 2023 07:06  Phos  4.4     11-25  Mg     1.8     11-25                    CAPILLARY BLOOD GLUCOSE    MICROBIOLOGY:     RADIOLOGY:  [ ] Reviewed and interpreted by me    Mode: AC/ CMV (Assist Control/ Continuous Mandatory Ventilation)  RR (machine): 12  TV (machine): 300  FiO2: 30  PEEP: 5  ITime: 1  MAP: 10  PIP: 30

## 2023-11-26 NOTE — PROGRESS NOTE ADULT - PROBLEM SELECTOR PLAN 7
- s/p Home Levemir 14 units in morning held on admission as noted w/ hypoglycemia   - Enteral feed changed to 4Tech diabetic formula  - Persistent hyperglycemia:  Endocrine consulted.

## 2023-11-26 NOTE — PROGRESS NOTE ADULT - SUBJECTIVE AND OBJECTIVE BOX
Patient is a 73y old  Female who presents with a chief complaint of peg tube   DATE OF SERVICE: 11-26-23      SUBJECTIVE / OVERNIGHT EVENTS: no new events      MEDICATIONS  (STANDING):  albuterol/ipratropium for Nebulization 3 milliLiter(s) Nebulizer every 6 hours  artificial  tears Solution 2 Drop(s) Both EYES four times a day  ascorbic acid 500 milliGRAM(s) Oral daily  bacitracin   Ointment 1 Application(s) Topical two times a day  bisacodyl Suppository 10 milliGRAM(s) Rectal once  calcium carbonate    500 mG (Tums) Chewable 1 Tablet(s) Chew every 12 hours  cephalexin 500 milliGRAM(s) Oral every 6 hours  chlorhexidine 0.12% Liquid 15 milliLiter(s) Oral Mucosa every 12 hours  chlorhexidine 4% Liquid 1 Application(s) Topical <User Schedule>  ciprofloxacin     Tablet 500 milliGRAM(s) Oral every 12 hours  dextrose 50% Injectable 50 milliLiter(s) IV Push once  enoxaparin Injectable 40 milliGRAM(s) SubCutaneous every 24 hours  escitalopram 10 milliGRAM(s) Oral daily  fentaNYL   Patch  25 MICROgram(s)/Hr 1 Patch Transdermal every 72 hours  gabapentin Solution 100 milliGRAM(s) Enteral Tube at bedtime  insulin glargine Injectable (LANTUS) 14 Unit(s) SubCutaneous every morning  insulin lispro (ADMELOG) corrective regimen sliding scale   SubCutaneous every 6 hours  levothyroxine 125 MICROGram(s) Oral <User Schedule>  lidocaine   4% Patch 1 Patch Transdermal daily  melatonin 3 milliGRAM(s) Oral at bedtime  multivitamin/minerals/iron Oral Solution (CENTRUM) 15 milliLiter(s) Oral daily  nystatin Powder 1 Application(s) Topical every 8 hours  oxybutynin 5 milliGRAM(s) Oral daily  pantoprazole   Suspension 40 milliGRAM(s) Enteral Tube daily  petrolatum Ophthalmic Ointment 1 Application(s) Both EYES four times a day  predniSONE   Tablet 5 milliGRAM(s) Oral daily  QUEtiapine 12.5 milliGRAM(s) Oral <User Schedule>  QUEtiapine 12.5 milliGRAM(s) Oral <User Schedule>  senna Syrup 10 milliLiter(s) Oral at bedtime  simethicone 80 milliGRAM(s) Chew four times a day  sodium chloride 3%  Inhalation 4 milliLiter(s) Inhalation every 12 hours  triamcinolone 0.1% Ointment 1 Application(s) Topical two times a day  zinc oxide 40% Paste 1 Application(s) Topical daily  zinc sulfate 220 milliGRAM(s) Oral daily    MEDICATIONS  (PRN):  acetaminophen   Oral Liquid .. 1000 milliGRAM(s) Enteral Tube every 6 hours PRN Temp greater or equal to 38C (100.4F), Mild Pain (1 - 3)  benzocaine 20% Spray 1 Spray(s) Topical four times a day PRN Cough  diphenhydrAMINE Elixir 25 milliGRAM(s) Oral every 6 hours PRN Rash and/or Itching  oxyCODONE    Solution 2.5 milliGRAM(s) Oral every 6 hours PRN Moderate Pain (4 - 6)  sodium chloride 0.65% Nasal 1 Spray(s) Both Nostrils every 6 hours PRN Nasal Congestion      Vital Signs Last 24 Hrs  T(C): 37.2 (26 Nov 2023 07:55), Max: 38.2 (26 Nov 2023 04:36)  T(F): 99 (26 Nov 2023 07:55), Max: 100.7 (26 Nov 2023 04:36)  HR: 90 (26 Nov 2023 12:04) (82 - 94)  BP: 130/68 (26 Nov 2023 04:36) (120/53 - 130/68)  BP(mean): --  RR: 18 (26 Nov 2023 04:36) (18 - 18)  SpO2: 99% (26 Nov 2023 12:04) (97% - 100%)    Parameters below as of 26 Nov 2023 06:09  Patient On (Oxygen Delivery Method): ventilator          Vital Signs Last 24 Hrs  T(C): 37.2 (26 Nov 2023 07:55), Max: 38.2 (26 Nov 2023 04:36)  T(F): 99 (26 Nov 2023 07:55), Max: 100.7 (26 Nov 2023 04:36)  HR: 90 (26 Nov 2023 12:04) (82 - 94)  BP: 130/68 (26 Nov 2023 04:36) (120/53 - 130/68)  BP(mean): --  RR: 18 (26 Nov 2023 04:36) (18 - 18)  SpO2: 99% (26 Nov 2023 12:04) (97% - 100%)    Parameters below as of 26 Nov 2023 06:09  Patient On (Oxygen Delivery Method): ventilator        I&O's Summary    17 Nov 2023 07:01  -  18 Nov 2023 07:00  --------------------------------------------------------  IN: 1300 mL / OUT: 950 mL / NET: 350 mL    18 Nov 2023 07:01  -  18 Nov 2023 17:32  --------------------------------------------------------  IN: 0 mL / OUT: 400 mL / NET: -400 mL        PHYSICAL EXAM:  GENERAL: NAD, well-developed  HEAD:  Atraumatic, Normocephalic  EYES: EOMI, PERRLA, conjunctiva and sclera clear  NECK: Supple, No JVD. On [facility] vent with a tracheostomy  CHEST/LUNG: Clear to auscultation bilaterally; No wheeze  HEART: Regular rate and rhythm; No murmurs, rubs, or gallops  ABDOMEN: Soft, Nontender, Nondistended; Bowel sounds present  EXTREMITIES:  2+ Peripheral Pulses, No clubbing, cyanosis, or edema  PSYCH: AAOx3  NEUROLOGY: non-focal. Functionally quadriplegic  SKIN: No rashes or lesions    LABS:                        7.5    7.48  )-----------( 134      ( 26 Nov 2023 07:27 )             25.6                                    8.6    8.89  )-----------( 126      ( 21 Nov 2023 07:05 )             29.1                8.9    9.19  )-----------( 124      ( 18 Nov 2023 09:15 )             30.4     11-18    137  |  99  |  29<H>  ----------------------------<  224<H>  4.1   |  28  |  <0.30<L>    Ca    10.0      18 Nov 2023 09:15  Mg     1.8     11-18    TPro  7.4  /  Alb  3.3  /  TBili  0.3  /  DBili  x   /  AST  19  /  ALT  18  /  AlkPhos  48  11-18          Urinalysis Basic - ( 18 Nov 2023 09:15 )    Color: x / Appearance: x / SG: x / pH: x  Gluc: 224 mg/dL / Ketone: x  / Bili: x / Urobili: x   Blood: x / Protein: x / Nitrite: x   Leuk Esterase: x / RBC: x / WBC x   Sq Epi: x / Non Sq Epi: x / Bacteria: x        RADIOLOGY & ADDITIONAL TESTS:    Imaging Personally Reviewed:    Consultant(s) Notes Reviewed:      Care Discussed with Consultants/Other Providers:

## 2023-11-26 NOTE — PROGRESS NOTE ADULT - PROBLEM SELECTOR PLAN 9
DVT PPx:  Lovenox discontinued given daughter's concern for trach and PEG stoma bleeding/oozing.  Also with mild thrombocytopenia.  Will continue SCDs.     GOC: Full code  __ long discussion of MICU team with daughter, pt remains FULL CODE, daughter wishes MOLST form reevaluation once pt is less delirious

## 2023-11-26 NOTE — PROGRESS NOTE ADULT - ASSESSMENT
72 y/o F with PMHx of T2DM, HTN, HLD, anemia, hypothyroidism, RA, fibromyalgia, remote cerebral aneurysm repair, acute hypercapnic respiratory failure now with tracheostomy, PEG tube, and bedbound (trach change 8/18, PEG change 8/14), sacral pressure ulcer, BIBEMS from home for 6 day hx of bleeding from the tracheostomy and PEG tube with associated abdominal pain, recent history of auditory and visual hallucinations, and with chronic sacral decubitus ulcer. Exam notable for mild abdominal distention with LLQ and RLQ tenderness, tracheostomy in place with no obvious bleeding, PEG tube with scant amount of dried blood, at baseline neurologic status. Imaging showing no active bleeding around tracheostomy site, however was notable for mild thickening along PEG tube tract, sacral ulcer extending to the coccyx suggestive of possible osteomyelitis, and bibasilar patchy lung opacities with volume loss. Patient admitted for respiratory support, wound care of tracheostomy and PEG, and management of sacral osteomyelitis. No further Trach and peg site  bleeding noted since admission.  Pelvic MRI imaging also suggestive of Acute OM. Patient placed on long-term antibiotics with Infectious disease guidance.  Additionally treated with a 7d course of Cefepime due to PSA and Serratia tracheitis on 11/8-->11/15 and then resumed cipro/keflex.  Hospital course c/b Delirium for which Seroquel was added and home Lexapro continued. Tracheostomy changed to #9 Cuffed [place] by ENT 11/3.  Despite trach change patient remained with persistent air leak.  On 11/19, the trach was again changed due to leak and loss of volumes on vent.  Trach was changed to #8 distal cuffed Shiley.  Patient seen by Dermatology for back lesions.  One diagnosed as a seborrheic keratitis and the other a dermatofibroma.       11/25-no new events.  FS remains elevated, Endocrine aware and adjustments made to insulin regimen.  Patient complained of headache which persisted despite receiving tylenol this morning.  Eventually resolved in early afternoon.  Daughter expresses concerns that patient's hgb has been dropping and of note is 8.1 today and may be related to patient's headache as she was told by a Hematologist at [facility] 1 year ago that patient's hgb should not be allowed to fall below 8.  Will defer decision to transfuse to current hematologist recommendations and clinically asymptomatic.  Patient tolerating PS 15/5 for majority of day.  Mild amount of mucoid secretions recovered during suctioning.       72 y/o F with PMHx of T2DM, HTN, HLD, anemia, hypothyroidism, RA, fibromyalgia, remote cerebral aneurysm repair, acute hypercapnic respiratory failure now with tracheostomy, PEG tube, and bedbound (trach change 8/18, PEG change 8/14), sacral pressure ulcer, BIBEMS from home for 6 day hx of bleeding from the tracheostomy and PEG tube with associated abdominal pain, recent history of auditory and visual hallucinations, and with chronic sacral decubitus ulcer. Exam notable for mild abdominal distention with LLQ and RLQ tenderness, tracheostomy in place with no obvious bleeding, PEG tube with scant amount of dried blood, at baseline neurologic status. Imaging showing no active bleeding around tracheostomy site, however was notable for mild thickening along PEG tube tract, sacral ulcer extending to the coccyx suggestive of possible osteomyelitis, and bibasilar patchy lung opacities with volume loss. Patient admitted for respiratory support, wound care of tracheostomy and PEG, and management of sacral osteomyelitis. No further Trach and peg site  bleeding noted since admission.  Pelvic MRI imaging also suggestive of Acute OM. Patient placed on long-term antibiotics with Infectious disease guidance.  Additionally treated with a 7d course of Cefepime due to PSA and Serratia tracheitis on 11/8-->11/15 and then resumed cipro/keflex.  Hospital course c/b Delirium for which Seroquel was added and home Lexapro continued. Tracheostomy changed to #9 Cuffed Port[place] by ENT 11/3.  Despite trach change patient remained with persistent air leak.  On 11/19, the trach was again changed due to leak and loss of volumes on vent.  Trach was changed to #8 distal cuffed Shiley.  Patient seen by Dermatology for back lesions.  One diagnosed as a seborrheic keratitis and the other a dermatofibroma.       11/25-no new events.  FS remains elevated, Endocrine aware and adjustments made to insulin regimen.  Patient complained of headache which persisted despite receiving tylenol this morning.  Eventually resolved in early afternoon.  Daughter expresses concerns that patient's hgb has been dropping and of note is 8.1 today and may be related to patient's headache as she was told by a Hematologist at [facility] 1 year ago that patient's hgb should not be allowed to fall below 8.  Will defer decision to transfuse to current hematologist recommendations and clinically asymptomatic.  Patient tolerating PS 15/5 for majority of day.  Mild amount of mucoid secretions recovered during suctioning.  11/26-no new events-temp to 100.7-fully cultured

## 2023-11-26 NOTE — CHART NOTE - NSCHARTNOTEFT_GEN_A_CORE
called by Medical Attending who knows this patient many years- has discussed with daughter falling HH and now is requesting one unit transfused.    consent obtained from daughter over phone.        repeat CBC, type and cross first.    GIRISH Romeo NP called by Medical Attending who knows this patient many years- has discussed with daughter falling HH and now is requesting one unit transfused.    consent obtained from daughter over phone.        repeat CBC, type and cross first.    GIRISH Romeo NP      addendum;   Repeat  CBC HGB 7.9       discussed with Dr Jolly- navin 1 unit packed cells.

## 2023-11-26 NOTE — PROCEDURE NOTE - ADDITIONAL PROCEDURE DETAILS
PowerGlide Pro Midline Catheter  Needle gauge 20G  Length 8cm  REF [id number]  LOT# [id number]  Exp Date: 3/31/2025

## 2023-11-26 NOTE — PROGRESS NOTE ADULT - NS ATTEND AMEND GEN_ALL_CORE FT
as above: no new events  73F with a h/o DM, HTN, HLD, anemia, hypothyroidism, RA, fibromyalgia, remote cerebral aneurysm with repair, hypercapnic respiratory failure s/p tracheostomy/PEG, bedbound, sacral pressure ulcer. Admitted w/ bleeding from tracheostomy and PEG w/ associated abdominal pain, recent hx of auditory/visual hallucinations. Since admission, no further bleeding noted from either trach or PEG. Imaging showed mild thickening along PEG tube tract and sacral ulcer extending to coccyx suggestive of OM.        # Osteomyelitis  # Chronic hypoxemic RF  # oropharyngeal dysphagia  # acute on chronic pain  # Trach/Peg bleeding  # Tracheitis with Pseudomonas and serratia  # Hx of hallucination, anxiety    Neuro- mental status appropriate, attempting to follow commands,  very weakly sampson  3+  ortho- MRI showing sacral Osteo,  c/w wound care, no plans for surgery   ID-  s/p  course of Cefepime on 11/15 for sputum culture with PsA and serratia, now on Cipro and Keflex until December, f/u ID   Wound care- f/u wound care reccs, improving wound, cont tx plan and outpt f/u  Resp- c/w Airway clearance -Duonebs and 3% Hypersal, chest PT,- PS trials during the day, vent at night, unable to tolerate TC yet  ENT- monitor for signs of trach site bleeding, none noted contact ENT if  bleeding noted (blood tinged due to suction trauma)  Pain- C/W pain control, multifactorial 2/2 to fibromyalgia as well as now osteo with pressure ulcer   GI- Peg irritation seen by GI again today, no bleeding and rec maalox gauze around site, H/H stable- negative AXR 11/22  Heme- Thrombocytopenia - improved has been stable since admission at current level    Neuro- Appreciate  follow up for delirium and hallucinations, patient calm and appropriate today - c/w Seroquel 12.5 mg in           early evening and  Lexapro in AM   optho- Corneal erythema improved, no evidence of discharge, completed a course of Cipro eye drops, c/w artificial tears  Derm- Appreciate derm consult for right back skin lesion- dermatofibroma, benign lesion. Mid back with irritated seborrheic             keratosis, topical triamcinolone 0.1% bid cont, outpt f/u w/ Derm  Endo- FS quite elvated despite increase in lantus and no other change in diet or meds (only topical steroid) will get Endo input  - DVT ppx- scd (lovenox d/c after d/w Daughter given the prior blood she had from trach and peg sites)     GOC: when pt more alert will re-discuss GOC (when lucid in past she had desired DNR approach)  Cesar Hernandez MD-Pulmonary   [phone] as above: temps o/n-100.7--fever w/up done  73F with a h/o DM, HTN, HLD, anemia, hypothyroidism, RA, fibromyalgia, remote cerebral aneurysm with repair, hypercapnic respiratory failure s/p tracheostomy/PEG, bedbound, sacral pressure ulcer. Admitted w/ bleeding from tracheostomy and PEG w/ associated abdominal pain, recent hx of auditory/visual hallucinations. Since admission, no further bleeding noted from either trach or PEG. Imaging showed mild thickening along PEG tube tract and sacral ulcer extending to coccyx suggestive of OM.        # Osteomyelitis  # Chronic hypoxemic RF  # oropharyngeal dysphagia  # acute on chronic pain  # Trach/Peg bleeding  # Tracheitis with Pseudomonas and serratia  # Hx of hallucination, anxiety    Neuro- mental status appropriate, attempting to follow commands,  very weakly sampson  3+  ortho- MRI showing sacral Osteo,  c/w wound care, no plans for surgery   ID-  s/p  course of Cefepime on 11/15 for sputum culture with PsA and serratia, now on Cipro and Keflex until December, f/u ID  ***** new temp o/n 100.7 11/26- repeat cx done-results pending   Wound care- f/u wound care reccs, improving wound, cont tx plan and outpt f/u  Resp- c/w Airway clearance -Duonebs and 3% Hypersal, chest PT,- PS trials during the day, vent at night, unable to tolerate TC yet  ENT- monitor for signs of trach site bleeding, none noted contact ENT if  bleeding noted (blood tinged due to suction trauma)  Pain- C/W pain control, multifactorial 2/2 to fibromyalgia as well as now osteo with pressure ulcer   GI- Peg irritation seen by GI again today, no bleeding and rec maalox gauze around site, H/H stable- negative AXR 11/22  Heme- Thrombocytopenia - improved has been stable since admission at current level    Neuro- Appreciate  follow up for delirium and hallucinations, patient calm and appropriate today - c/w Seroquel 12.5 mg in           early evening and  Lexapro in AM   optho- Corneal erythema improved, no evidence of discharge, completed a course of Cipro eye drops, c/w artificial tears  Derm- Appreciate derm consult for right back skin lesion- dermatofibroma, benign lesion. Mid back with irritated seborrheic             keratosis, topical triamcinolone 0.1% bid cont, outpt f/u w/ Derm  Endo- FS quite elvated despite increase in lantus and no other change in diet or meds (only topical steroid) will get Endo input  - DVT ppx- scd (lovenox d/c after d/w Daughter given the prior blood she had from trach and peg sites)     GOC: when pt more alert will re-discuss GOC (when lucid in past she had desired DNR approach)  Cesar Hernandez MD-Pulmonary   [phone]

## 2023-11-27 LAB
CULTURE RESULTS: SIGNIFICANT CHANGE UP
GLUCOSE BLDC GLUCOMTR-MCNC: 100 MG/DL — HIGH (ref 70–99)
GLUCOSE BLDC GLUCOMTR-MCNC: 100 MG/DL — HIGH (ref 70–99)
GLUCOSE BLDC GLUCOMTR-MCNC: 108 MG/DL — HIGH (ref 70–99)
GLUCOSE BLDC GLUCOMTR-MCNC: 108 MG/DL — HIGH (ref 70–99)
GLUCOSE BLDC GLUCOMTR-MCNC: 148 MG/DL — HIGH (ref 70–99)
GLUCOSE BLDC GLUCOMTR-MCNC: 148 MG/DL — HIGH (ref 70–99)
GLUCOSE BLDC GLUCOMTR-MCNC: 155 MG/DL — HIGH (ref 70–99)
GLUCOSE BLDC GLUCOMTR-MCNC: 155 MG/DL — HIGH (ref 70–99)
GLUCOSE BLDC GLUCOMTR-MCNC: 158 MG/DL — HIGH (ref 70–99)
GLUCOSE BLDC GLUCOMTR-MCNC: 158 MG/DL — HIGH (ref 70–99)
GLUCOSE BLDC GLUCOMTR-MCNC: 86 MG/DL — SIGNIFICANT CHANGE UP (ref 70–99)
GLUCOSE BLDC GLUCOMTR-MCNC: 86 MG/DL — SIGNIFICANT CHANGE UP (ref 70–99)
GLUCOSE BLDC GLUCOMTR-MCNC: 89 MG/DL — SIGNIFICANT CHANGE UP (ref 70–99)
GLUCOSE BLDC GLUCOMTR-MCNC: 89 MG/DL — SIGNIFICANT CHANGE UP (ref 70–99)
GLUCOSE BLDC GLUCOMTR-MCNC: 98 MG/DL — SIGNIFICANT CHANGE UP (ref 70–99)
GLUCOSE BLDC GLUCOMTR-MCNC: 98 MG/DL — SIGNIFICANT CHANGE UP (ref 70–99)
HCT VFR BLD CALC: 30.4 % — LOW (ref 34.5–45)
HCT VFR BLD CALC: 30.4 % — LOW (ref 34.5–45)
HGB BLD-MCNC: 9.3 G/DL — LOW (ref 11.5–15.5)
HGB BLD-MCNC: 9.3 G/DL — LOW (ref 11.5–15.5)
MCHC RBC-ENTMCNC: 28.2 PG — SIGNIFICANT CHANGE UP (ref 27–34)
MCHC RBC-ENTMCNC: 28.2 PG — SIGNIFICANT CHANGE UP (ref 27–34)
MCHC RBC-ENTMCNC: 30.6 GM/DL — LOW (ref 32–36)
MCHC RBC-ENTMCNC: 30.6 GM/DL — LOW (ref 32–36)
MCV RBC AUTO: 92.1 FL — SIGNIFICANT CHANGE UP (ref 80–100)
MCV RBC AUTO: 92.1 FL — SIGNIFICANT CHANGE UP (ref 80–100)
NRBC # BLD: 0 /100 WBCS — SIGNIFICANT CHANGE UP (ref 0–0)
NRBC # BLD: 0 /100 WBCS — SIGNIFICANT CHANGE UP (ref 0–0)
PLATELET # BLD AUTO: 124 K/UL — LOW (ref 150–400)
PLATELET # BLD AUTO: 124 K/UL — LOW (ref 150–400)
RBC # BLD: 3.3 M/UL — LOW (ref 3.8–5.2)
RBC # BLD: 3.3 M/UL — LOW (ref 3.8–5.2)
RBC # FLD: 16.1 % — HIGH (ref 10.3–14.5)
RBC # FLD: 16.1 % — HIGH (ref 10.3–14.5)
SPECIMEN SOURCE: SIGNIFICANT CHANGE UP
WBC # BLD: 6.94 K/UL — SIGNIFICANT CHANGE UP (ref 3.8–10.5)
WBC # BLD: 6.94 K/UL — SIGNIFICANT CHANGE UP (ref 3.8–10.5)
WBC # FLD AUTO: 6.94 K/UL — SIGNIFICANT CHANGE UP (ref 3.8–10.5)
WBC # FLD AUTO: 6.94 K/UL — SIGNIFICANT CHANGE UP (ref 3.8–10.5)

## 2023-11-27 PROCEDURE — 99232 SBSQ HOSP IP/OBS MODERATE 35: CPT

## 2023-11-27 PROCEDURE — 99497 ADVNCD CARE PLAN 30 MIN: CPT

## 2023-11-27 PROCEDURE — 74018 RADEX ABDOMEN 1 VIEW: CPT | Mod: 26

## 2023-11-27 PROCEDURE — 99233 SBSQ HOSP IP/OBS HIGH 50: CPT | Mod: 25

## 2023-11-27 RX ORDER — PHENYLEPHRINE-SHARK LIVER OIL-MINERAL OIL-PETROLATUM RECTAL OINTMENT
1 OINTMENT (GRAM) RECTAL DAILY
Refills: 0 | Status: DISCONTINUED | OUTPATIENT
Start: 2023-11-28 | End: 2024-01-17

## 2023-11-27 RX ORDER — PHENYLEPHRINE-SHARK LIVER OIL-MINERAL OIL-PETROLATUM RECTAL OINTMENT
OINTMENT (GRAM) RECTAL
Refills: 0 | Status: DISCONTINUED | OUTPATIENT
Start: 2023-11-27 | End: 2023-11-27

## 2023-11-27 RX ORDER — INSULIN HUMAN 100 [IU]/ML
8 INJECTION, SOLUTION SUBCUTANEOUS ONCE
Refills: 0 | Status: COMPLETED | OUTPATIENT
Start: 2023-11-27 | End: 2023-11-27

## 2023-11-27 RX ORDER — PHENYLEPHRINE-SHARK LIVER OIL-MINERAL OIL-PETROLATUM RECTAL OINTMENT
OINTMENT (GRAM) RECTAL
Refills: 0 | Status: DISCONTINUED | OUTPATIENT
Start: 2023-11-27 | End: 2024-01-17

## 2023-11-27 RX ORDER — PHENYLEPHRINE-SHARK LIVER OIL-MINERAL OIL-PETROLATUM RECTAL OINTMENT
1 OINTMENT (GRAM) RECTAL ONCE
Refills: 0 | Status: COMPLETED | OUTPATIENT
Start: 2023-11-27 | End: 2023-11-27

## 2023-11-27 RX ADMIN — PANTOPRAZOLE SODIUM 40 MILLIGRAM(S): 20 TABLET, DELAYED RELEASE ORAL at 06:24

## 2023-11-27 RX ADMIN — Medication 500 MILLIGRAM(S): at 12:24

## 2023-11-27 RX ADMIN — Medication 1 TABLET(S): at 18:26

## 2023-11-27 RX ADMIN — Medication 2 DROP(S): at 23:53

## 2023-11-27 RX ADMIN — Medication 5 MILLIGRAM(S): at 06:24

## 2023-11-27 RX ADMIN — Medication 3 MILLIGRAM(S): at 23:52

## 2023-11-27 RX ADMIN — CHLORHEXIDINE GLUCONATE 1 APPLICATION(S): 213 SOLUTION TOPICAL at 06:22

## 2023-11-27 RX ADMIN — SIMETHICONE 80 MILLIGRAM(S): 80 TABLET, CHEWABLE ORAL at 02:37

## 2023-11-27 RX ADMIN — Medication 500 MILLIGRAM(S): at 23:53

## 2023-11-27 RX ADMIN — SIMETHICONE 80 MILLIGRAM(S): 80 TABLET, CHEWABLE ORAL at 06:24

## 2023-11-27 RX ADMIN — INSULIN HUMAN 17 UNIT(S): 100 INJECTION, SOLUTION SUBCUTANEOUS at 06:23

## 2023-11-27 RX ADMIN — GABAPENTIN 100 MILLIGRAM(S): 400 CAPSULE ORAL at 23:53

## 2023-11-27 RX ADMIN — Medication 500 MILLIGRAM(S): at 02:37

## 2023-11-27 RX ADMIN — INSULIN HUMAN 17 UNIT(S): 100 INJECTION, SOLUTION SUBCUTANEOUS at 12:26

## 2023-11-27 RX ADMIN — LIDOCAINE 1 PATCH: 4 CREAM TOPICAL at 13:07

## 2023-11-27 RX ADMIN — Medication 2 DROP(S): at 06:22

## 2023-11-27 RX ADMIN — Medication 2 DROP(S): at 13:08

## 2023-11-27 RX ADMIN — INSULIN GLARGINE 30 UNIT(S): 100 INJECTION, SOLUTION SUBCUTANEOUS at 08:35

## 2023-11-27 RX ADMIN — SODIUM CHLORIDE 4 MILLILITER(S): 9 INJECTION INTRAMUSCULAR; INTRAVENOUS; SUBCUTANEOUS at 17:15

## 2023-11-27 RX ADMIN — AMLODIPINE BESYLATE 10 MILLIGRAM(S): 2.5 TABLET ORAL at 06:24

## 2023-11-27 RX ADMIN — Medication 3 MILLILITER(S): at 11:24

## 2023-11-27 RX ADMIN — Medication 3 MILLILITER(S): at 05:30

## 2023-11-27 RX ADMIN — NYSTATIN CREAM 1 APPLICATION(S): 100000 CREAM TOPICAL at 13:08

## 2023-11-27 RX ADMIN — Medication 15 MILLILITER(S): at 12:23

## 2023-11-27 RX ADMIN — SODIUM CHLORIDE 4 MILLILITER(S): 9 INJECTION INTRAMUSCULAR; INTRAVENOUS; SUBCUTANEOUS at 05:30

## 2023-11-27 RX ADMIN — NYSTATIN CREAM 1 APPLICATION(S): 100000 CREAM TOPICAL at 06:22

## 2023-11-27 RX ADMIN — Medication 200 MILLIGRAM(S): at 06:23

## 2023-11-27 RX ADMIN — LIDOCAINE 1 PATCH: 4 CREAM TOPICAL at 19:12

## 2023-11-27 RX ADMIN — Medication 2: at 06:22

## 2023-11-27 RX ADMIN — Medication 1 APPLICATION(S): at 06:23

## 2023-11-27 RX ADMIN — Medication 1 APPLICATION(S): at 18:38

## 2023-11-27 RX ADMIN — NYSTATIN CREAM 1 APPLICATION(S): 100000 CREAM TOPICAL at 23:55

## 2023-11-27 RX ADMIN — ZINC OXIDE 1 APPLICATION(S): 200 OINTMENT TOPICAL at 12:28

## 2023-11-27 RX ADMIN — Medication 2: at 12:25

## 2023-11-27 RX ADMIN — Medication 1 APPLICATION(S): at 02:37

## 2023-11-27 RX ADMIN — Medication 1000 MILLIGRAM(S): at 02:51

## 2023-11-27 RX ADMIN — Medication 1 APPLICATION(S): at 18:27

## 2023-11-27 RX ADMIN — PHENYLEPHRINE-SHARK LIVER OIL-MINERAL OIL-PETROLATUM RECTAL OINTMENT 1 APPLICATION(S): at 18:37

## 2023-11-27 RX ADMIN — LIDOCAINE 1 PATCH: 4 CREAM TOPICAL at 00:45

## 2023-11-27 RX ADMIN — Medication 3 MILLILITER(S): at 23:30

## 2023-11-27 RX ADMIN — Medication 500 MILLIGRAM(S): at 18:26

## 2023-11-27 RX ADMIN — ESCITALOPRAM OXALATE 10 MILLIGRAM(S): 10 TABLET, FILM COATED ORAL at 08:33

## 2023-11-27 RX ADMIN — SIMETHICONE 80 MILLIGRAM(S): 80 TABLET, CHEWABLE ORAL at 23:53

## 2023-11-27 RX ADMIN — Medication 500 MILLIGRAM(S): at 06:24

## 2023-11-27 RX ADMIN — Medication 1 TABLET(S): at 06:24

## 2023-11-27 RX ADMIN — Medication 2 DROP(S): at 18:27

## 2023-11-27 RX ADMIN — Medication 1 APPLICATION(S): at 12:27

## 2023-11-27 RX ADMIN — Medication 200 MILLIGRAM(S): at 18:26

## 2023-11-27 RX ADMIN — SIMETHICONE 80 MILLIGRAM(S): 80 TABLET, CHEWABLE ORAL at 12:24

## 2023-11-27 RX ADMIN — CHLORHEXIDINE GLUCONATE 15 MILLILITER(S): 213 SOLUTION TOPICAL at 18:26

## 2023-11-27 RX ADMIN — QUETIAPINE FUMARATE 12.5 MILLIGRAM(S): 200 TABLET, FILM COATED ORAL at 18:36

## 2023-11-27 RX ADMIN — SIMETHICONE 80 MILLIGRAM(S): 80 TABLET, CHEWABLE ORAL at 18:27

## 2023-11-27 RX ADMIN — CHLORHEXIDINE GLUCONATE 15 MILLILITER(S): 213 SOLUTION TOPICAL at 06:21

## 2023-11-27 RX ADMIN — Medication 1 APPLICATION(S): at 23:54

## 2023-11-27 RX ADMIN — Medication 1000 MILLIGRAM(S): at 02:21

## 2023-11-27 RX ADMIN — Medication 125 MICROGRAM(S): at 06:24

## 2023-11-27 RX ADMIN — Medication 3 MILLILITER(S): at 17:15

## 2023-11-27 RX ADMIN — Medication 2 DROP(S): at 02:37

## 2023-11-27 RX ADMIN — Medication 5 MILLIGRAM(S): at 12:24

## 2023-11-27 NOTE — PROGRESS NOTE ADULT - SUBJECTIVE AND OBJECTIVE BOX
Patient is a 73y old  Female who presents with a chief complaint of Bleeding from tracheostomy and PEG tube (2023 08:15)      Interval Events:    REVIEW OF SYSTEMS:  [ ] Positive  [ ] All other systems negative  [ ] Unable to assess ROS because ________    Vital Signs Last 24 Hrs  T(C): 36.6 (23 @ 03:47), Max: 36.9 (23 @ 12:30)  T(F): 97.9 (23 @ 03:47), Max: 98.5 (23 @ 12:30)  HR: 86 (23 @ 08:43) (69 - 98)  BP: 115/57 (23 @ 06:32) (110/49 - 135/49)  RR: 18 (23 @ 03:47) (17 - 20)  SpO2: 100% (23 @ 08:43) (98% - 100%)    PHYSICAL EXAM:  HEENT:   [ ]Tracheostomy:  [ ]Pupils equal  [ ]No oral lesions  [ ]Abnormal    SKIN  [ ]No Rash  [ ] Abnormal  [ ] pressure    CARDIAC  [ ]Regular  [ ]Abnormal    PULMONARY  [ ]Bilateral Clear Breath Sounds  [ ]Normal Excursion  [ ]Abnormal    GI  [ ]PEG      [ ] +BS		              [ ]Soft, nondistended, nontender	  [ ]Abnormal    MUSCULOSKELETAL                                   [ ]Bedbound                 [ ]Abnormal    [ ]Ambulatory/OOB to chair                           EXTREMITIES                                         [ ]Normal  [ ]Edema                           NEUROLOGIC  [ ] Normal, non focal  [ ] Focal findings:    PSYCHIATRIC  [ ]Alert and appropriate  [ ] Sedated	 [ ]Agitated    :  Reed: [ ] Yes, if yes: Date of Placement:                   [  ] No    LINES: Central Lines [ ] Yes, if yes: Date of Placement                                     [  ] No    HOSPITAL MEDICATIONS:  MEDICATIONS  (STANDING):  albuterol/ipratropium for Nebulization 3 milliLiter(s) Nebulizer every 6 hours  amLODIPine   Tablet 10 milliGRAM(s) Oral daily  artificial  tears Solution 2 Drop(s) Both EYES four times a day  ascorbic acid 500 milliGRAM(s) Oral daily  calcium carbonate    500 mG (Tums) Chewable 1 Tablet(s) Chew every 12 hours  cephalexin 500 milliGRAM(s) Oral every 6 hours  chlorhexidine 0.12% Liquid 15 milliLiter(s) Oral Mucosa every 12 hours  chlorhexidine 4% Liquid 1 Application(s) Topical <User Schedule>  ciprofloxacin   IVPB 400 milliGRAM(s) IV Intermittent every 12 hours  dextrose 50% Injectable 12.5 Gram(s) IV Push once  dextrose 50% Injectable 25 Gram(s) IV Push once  dextrose 50% Injectable 25 Gram(s) IV Push once  dextrose 50% Injectable 50 milliLiter(s) IV Push once  dextrose Oral Gel 15 Gram(s) Oral once  escitalopram 10 milliGRAM(s) Oral <User Schedule>  gabapentin Solution 100 milliGRAM(s) Enteral Tube at bedtime  glucagon  Injectable 1 milliGRAM(s) IntraMuscular once  insulin glargine Injectable (LANTUS) 30 Unit(s) SubCutaneous every morning  insulin lispro (ADMELOG) corrective regimen sliding scale   SubCutaneous every 6 hours  insulin regular  human recombinant 17 Unit(s) SubCutaneous <User Schedule>  levothyroxine 125 MICROGram(s) Oral <User Schedule>  lidocaine   4% Patch 1 Patch Transdermal daily  melatonin 3 milliGRAM(s) Oral at bedtime  multivitamin/minerals/iron Oral Solution (CENTRUM) 15 milliLiter(s) Oral daily  nystatin Powder 1 Application(s) Topical every 8 hours  oxybutynin 5 milliGRAM(s) Oral daily  pantoprazole   Suspension 40 milliGRAM(s) Enteral Tube <User Schedule>  petrolatum Ophthalmic Ointment 1 Application(s) Both EYES four times a day  polyethylene glycol 3350 17 Gram(s) Oral <User Schedule>  predniSONE   Tablet 5 milliGRAM(s) Oral daily  QUEtiapine 12.5 milliGRAM(s) Oral <User Schedule>  senna Syrup 15 milliLiter(s) Oral at bedtime  simethicone 80 milliGRAM(s) Chew four times a day  sodium chloride 3%  Inhalation 4 milliLiter(s) Inhalation every 12 hours  triamcinolone 0.1% Ointment 1 Application(s) Topical two times a day  zinc oxide 40% Paste 1 Application(s) Topical daily    MEDICATIONS  (PRN):  acetaminophen   Oral Liquid .. 1000 milliGRAM(s) Enteral Tube every 6 hours PRN Temp greater or equal to 38C (100.4F), Mild Pain (1 - 3)  benzocaine 20% Spray 1 Spray(s) Topical four times a day PRN Cough  diphenhydrAMINE Elixir 25 milliGRAM(s) Oral every 6 hours PRN Rash and/or Itching  guaifenesin/dextromethorphan Oral Liquid 10 milliLiter(s) Oral every 6 hours PRN Cough  sodium chloride 0.65% Nasal 1 Spray(s) Both Nostrils every 6 hours PRN Nasal Congestion      LABS:                        9.3    6.94  )-----------( 124      ( 2023 06:44 )             30.4     11-    135  |  97  |  27<H>  ----------------------------<  122<H>  3.9   |  28  |  <0.30<L>    Ca    9.7      2023 07:24        Urinalysis Basic - ( 2023 15:56 )    Color: Yellow / Appearance: Clear / S.015 / pH: x  Gluc: x / Ketone: Trace mg/dL  / Bili: Negative / Urobili: 0.2 mg/dL   Blood: x / Protein: Negative mg/dL / Nitrite: Negative   Leuk Esterase: Trace / RBC: 1 /HPF / WBC 2 /HPF   Sq Epi: x / Non Sq Epi: 1 /HPF / Bacteria: Negative /HPF          CAPILLARY BLOOD GLUCOSE    MICROBIOLOGY:     RADIOLOGY:  [ ] Reviewed and interpreted by me    Mode: CPAP with PS  FiO2: 30  PEEP: 5  PS: 12  MAP: 9  PIP: 18   Patient is a 73y old  Female who presents with a chief complaint of Bleeding from tracheostomy and PEG tube (2023 08:15)      Interval Events:  No acute events overnight.    REVIEW OF SYSTEMS:  [ ] Positive  [X] All other systems negative  [ ] Unable to assess ROS because ________    Vital Signs Last 24 Hrs  T(C): 36.6 (23 @ 03:47), Max: 36.9 (23 @ 12:30)  T(F): 97.9 (23 @ 03:47), Max: 98.5 (23 @ 12:30)  HR: 86 (23 @ 08:43) (69 - 98)  BP: 115/57 (23 @ 06:32) (110/49 - 135/49)  RR: 18 (23 @ 03:47) (17 - 20)  SpO2: 100% (23 @ 08:43) (98% - 100%)    PHYSICAL EXAM:  HEENT:   [X]Tracheostomy: #8 distal XLT Shiley  [ ]Pupils equal - pt refused assessment  [ ]No oral lesions  [ ]Abnormal    SKIN  [ ]No Rash  [ ] Abnormal  [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    CARDIAC  [X]Regular  [ ]Abnormal    PULMONARY  [ ]Bilateral Clear Breath Sounds  [ ]Normal Excursion  [X]Abnormal - BL Coarse BS    GI  [X]PEG      [X] +BS		              [X]Soft, nondistended, nontender	  [ ]Abnormal    MUSCULOSKELETAL                                   [X]Bedbound                 [ ]Abnormal    [ ]Ambulatory/OOB to chair                           EXTREMITIES                                         [X]Normal  [ ]Edema                           NEUROLOGIC  [X] Normal, non focal - opens eyes, following commands x4 extremities  [ ] Focal findings:    PSYCHIATRIC  [X]Alert and appropriate  [ ] Sedated	 [ ]Agitated    :  Mcbride: [ ] Yes, if yes: Date of Placement:                   [X] No    LINES: Central Lines [ ] Yes, if yes: Date of Placement                                     [X] No    HOSPITAL MEDICATIONS:  MEDICATIONS  (STANDING):  albuterol/ipratropium for Nebulization 3 milliLiter(s) Nebulizer every 6 hours  amLODIPine   Tablet 10 milliGRAM(s) Oral daily  artificial  tears Solution 2 Drop(s) Both EYES four times a day  ascorbic acid 500 milliGRAM(s) Oral daily  calcium carbonate    500 mG (Tums) Chewable 1 Tablet(s) Chew every 12 hours  cephalexin 500 milliGRAM(s) Oral every 6 hours  chlorhexidine 0.12% Liquid 15 milliLiter(s) Oral Mucosa every 12 hours  chlorhexidine 4% Liquid 1 Application(s) Topical <User Schedule>  ciprofloxacin   IVPB 400 milliGRAM(s) IV Intermittent every 12 hours  dextrose 50% Injectable 12.5 Gram(s) IV Push once  dextrose 50% Injectable 25 Gram(s) IV Push once  dextrose 50% Injectable 25 Gram(s) IV Push once  dextrose 50% Injectable 50 milliLiter(s) IV Push once  dextrose Oral Gel 15 Gram(s) Oral once  escitalopram 10 milliGRAM(s) Oral <User Schedule>  gabapentin Solution 100 milliGRAM(s) Enteral Tube at bedtime  glucagon  Injectable 1 milliGRAM(s) IntraMuscular once  insulin glargine Injectable (LANTUS) 30 Unit(s) SubCutaneous every morning  insulin lispro (ADMELOG) corrective regimen sliding scale   SubCutaneous every 6 hours  insulin regular  human recombinant 17 Unit(s) SubCutaneous <User Schedule>  levothyroxine 125 MICROGram(s) Oral <User Schedule>  lidocaine   4% Patch 1 Patch Transdermal daily  melatonin 3 milliGRAM(s) Oral at bedtime  multivitamin/minerals/iron Oral Solution (CENTRUM) 15 milliLiter(s) Oral daily  nystatin Powder 1 Application(s) Topical every 8 hours  oxybutynin 5 milliGRAM(s) Oral daily  pantoprazole   Suspension 40 milliGRAM(s) Enteral Tube <User Schedule>  petrolatum Ophthalmic Ointment 1 Application(s) Both EYES four times a day  polyethylene glycol 3350 17 Gram(s) Oral <User Schedule>  predniSONE   Tablet 5 milliGRAM(s) Oral daily  QUEtiapine 12.5 milliGRAM(s) Oral <User Schedule>  senna Syrup 15 milliLiter(s) Oral at bedtime  simethicone 80 milliGRAM(s) Chew four times a day  sodium chloride 3%  Inhalation 4 milliLiter(s) Inhalation every 12 hours  triamcinolone 0.1% Ointment 1 Application(s) Topical two times a day  zinc oxide 40% Paste 1 Application(s) Topical daily    MEDICATIONS  (PRN):  acetaminophen   Oral Liquid .. 1000 milliGRAM(s) Enteral Tube every 6 hours PRN Temp greater or equal to 38C (100.4F), Mild Pain (1 - 3)  benzocaine 20% Spray 1 Spray(s) Topical four times a day PRN Cough  diphenhydrAMINE Elixir 25 milliGRAM(s) Oral every 6 hours PRN Rash and/or Itching  guaifenesin/dextromethorphan Oral Liquid 10 milliLiter(s) Oral every 6 hours PRN Cough  sodium chloride 0.65% Nasal 1 Spray(s) Both Nostrils every 6 hours PRN Nasal Congestion      LABS:                        9.3    6.94  )-----------( 124      ( 2023 06:44 )             30.4     11-    135  |  97  |  27<H>  ----------------------------<  122<H>  3.9   |  28  |  <0.30<L>    Ca    9.7      2023 07:24        Urinalysis Basic - ( 2023 15:56 )    Color: Yellow / Appearance: Clear / S.015 / pH: x  Gluc: x / Ketone: Trace mg/dL  / Bili: Negative / Urobili: 0.2 mg/dL   Blood: x / Protein: Negative mg/dL / Nitrite: Negative   Leuk Esterase: Trace / RBC: 1 /HPF / WBC 2 /HPF   Sq Epi: x / Non Sq Epi: 1 /HPF / Bacteria: Negative /HPF          CAPILLARY BLOOD GLUCOSE    MICROBIOLOGY:     RADIOLOGY:  [ ] Reviewed and interpreted by me    Mode: CPAP with PS  FiO2: 30  PEEP: 5  PS: 12  MAP: 9  PIP: 18

## 2023-11-27 NOTE — PROGRESS NOTE ADULT - ASSESSMENT
73 year old female with respiratory failure s/p trach and PEG, sacral osteomyelitis.  The patient's daughter was at bedside and she had numerous concerns. The abdominal pain is now improved, the patient is nontender on exam. SHe has no fecal impaction on LARON.  The patient's daughter is concerned about the erratic nature of the bowel movements, but I explained that there are numerous factors ranging from debility, antibiotics, and tube feeds. She desires that laxatives be given only PRN, which is reasonable. I tried to explain that the bowel movements will continue to be inconsistent due to the aformentioned factors.  I also explained that some drainage and oozing of blood is expected from the hypergranulation tissue at the PEG site, and that barrier creams and frequent dressing changes may help.

## 2023-11-27 NOTE — PROGRESS NOTE ADULT - PROBLEM SELECTOR PLAN 7
- s/p Home Levemir 14 units in morning held on admission as noted w/ hypoglycemia   - Enteral feed changed to Entelo diabetic formula  - Persistent hyperglycemia:  Endocrine consulted.

## 2023-11-27 NOTE — PROGRESS NOTE ADULT - TIME BILLING
review of laboratory data, radiology results, discussion with primary team\patient, and monitoring for potential decompensation. Interventions were performed as documented above review of records/results/imaging, medical evaluation/management/documentation, and discussion with patient/daughter/medical providers and coordination of care. Time spent is separate from time spent on ACP. Patient remains FULL CODE at this time.

## 2023-11-27 NOTE — PROGRESS NOTE ADULT - PROBLEM SELECTOR PLAN 5
- Home amlodipine held on admission,)__ restarted today -- continue monitoring BP   - Echo from 10/17/2020 notable for hyperdynamic L ventricular systolic function, moderately severe LVOT obstruction, and EF 81% (per Wilkinosn calculation) vs 77% (per Teichholz calculation)

## 2023-11-27 NOTE — PROGRESS NOTE ADULT - ASSESSMENT
74 y/o F with PMHx of T2DM, HTN, HLD, anemia, hypothyroidism, RA, fibromyalgia, remote cerebral aneurysm repair, acute hypercapnic respiratory failure now with tracheostomy, PEG tube, and bedbound (trach change 8/18, PEG change 8/14), sacral pressure ulcer, BIBEMS from home for 6 day hx of bleeding from the tracheostomy and PEG tube with associated abdominal pain, recent history of auditory and visual hallucinations, and with chronic sacral decubitus ulcer. Exam notable for mild abdominal distention with LLQ and RLQ tenderness, tracheostomy in place with no obvious bleeding, PEG tube with scant amount of dried blood, at baseline neurologic status. Imaging showing no active bleeding around tracheostomy site, however was notable for mild thickening along PEG tube tract, sacral ulcer extending to the coccyx suggestive of possible osteomyelitis, and bibasilar patchy lung opacities with volume loss. Patient admitted for respiratory support, wound care of tracheostomy and PEG, and management of sacral osteomyelitis. No further Trach and peg site  bleeding noted since admission.  Pelvic MRI imaging also suggestive of Acute OM. Patient placed on long-term antibiotics with Infectious disease guidance.  Additionally treated with a 7d course of Cefepime due to PSA and Serratia tracheitis on 11/8-->11/15 and then resumed cipro/keflex.  Hospital course c/b Delirium for which Seroquel was added and home Lexapro continued. Tracheostomy changed to #9 Cuffed [place] by ENT 11/3.  Despite trach change patient remained with persistent air leak.  On 11/19, the trach was again changed due to leak and loss of volumes on vent.  Trach was changed to #8 distal cuffed [place].  Patient seen by Dermatology for back lesions.  One diagnosed as a seborrheic keratitis and the other a dermatofibroma.       11/25-no new events.  FS remains elevated, Endocrine aware and adjustments made to insulin regimen.  Patient complained of headache which persisted despite receiving tylenol this morning.  Eventually resolved in early afternoon.  Daughter expresses concerns that patient's hgb has been dropping and of note is 8.1 today and may be related to patient's headache as she was told by a Hematologist at [facility] 1 year ago that patient's hgb should not be allowed to fall below 8.  Will defer decision to transfuse to current hematologist recommendations and clinically asymptomatic.  Patient tolerating PS 15/5 for majority of day.  Mild amount of mucoid secretions recovered during suctioning.  11/26-no new events-temp to 100.7-fully cultured       74 y/o F with PMHx of T2DM, HTN, HLD, anemia, hypothyroidism, RA, fibromyalgia, remote cerebral aneurysm repair, acute hypercapnic respiratory failure now with tracheostomy, PEG tube, and bedbound (trach change 8/18, PEG change 8/14), sacral pressure ulcer, BIBEMS from home for 6 day hx of bleeding from the tracheostomy and PEG tube with associated abdominal pain, recent history of auditory and visual hallucinations, and with chronic sacral decubitus ulcer. Exam notable for mild abdominal distention with LLQ and RLQ tenderness, tracheostomy in place with no obvious bleeding, PEG tube with scant amount of dried blood, at baseline neurologic status. Imaging showing no active bleeding around tracheostomy site, however was notable for mild thickening along PEG tube tract, sacral ulcer extending to the coccyx suggestive of possible osteomyelitis, and bibasilar patchy lung opacities with volume loss. Patient admitted for respiratory support, wound care of tracheostomy and PEG, and management of sacral osteomyelitis. No further Trach and peg site  bleeding noted since admission.  Pelvic MRI imaging also suggestive of Acute OM. Patient placed on long-term antibiotics with Infectious disease guidance.  Additionally treated with a 7d course of Cefepime due to PSA and Serratia tracheitis on 11/8-->11/15 and then resumed cipro/keflex.  Hospital course c/b Delirium for which Seroquel was added and home Lexapro continued. Tracheostomy changed to #9 Cuffed [place] by ENT 11/3.  Despite trach change patient remained with persistent air leak.  On 11/19, the trach was again changed due to leak and loss of volumes on vent.  Trach was changed to #8 distal cuffed [place].  Patient seen by Dermatology for back lesions.  One diagnosed as a seborrheic keratitis and the other a dermatofibroma.     11/27: MDR pseudomonas now on contact precautions.  Minimal secretions, no acute respiratory distress noted.  ID consulted - monitor off antibiotics for now.  Hx of recent dental abscess as per pts daughter - dental consulted.  S/p blood transfusion yesterday with good response - reached out to heme, waiting for response.  Daughter updated in status/POC. 74 y/o F with PMHx of T2DM, HTN, HLD, anemia, hypothyroidism, RA, fibromyalgia, remote cerebral aneurysm repair, acute hypercapnic respiratory failure now with tracheostomy, PEG tube, and bedbound (trach change 8/18, PEG change 8/14), sacral pressure ulcer, BIBEMS from home for 6 day hx of bleeding from the tracheostomy and PEG tube with associated abdominal pain, recent history of auditory and visual hallucinations, and with chronic sacral decubitus ulcer. Exam notable for mild abdominal distention with LLQ and RLQ tenderness, tracheostomy in place with no obvious bleeding, PEG tube with scant amount of dried blood, at baseline neurologic status. Imaging showing no active bleeding around tracheostomy site, however was notable for mild thickening along PEG tube tract, sacral ulcer extending to the coccyx suggestive of possible osteomyelitis, and bibasilar patchy lung opacities with volume loss. Patient admitted for respiratory support, wound care of tracheostomy and PEG, and management of sacral osteomyelitis. No further Trach and peg site  bleeding noted since admission.  Pelvic MRI imaging also suggestive of Acute OM. Patient placed on long-term antibiotics with Infectious disease guidance.  Additionally treated with a 7d course of Cefepime due to PSA and Serratia tracheitis on 11/8-->11/15 and then resumed cipro/keflex.  Hospital course c/b Delirium for which Seroquel was added and home Lexapro continued. Tracheostomy changed to #9 Cuffed [place] by ENT 11/3.  Despite trach change patient remained with persistent air leak.  On 11/19, the trach was again changed due to leak and loss of volumes on vent.  Trach was changed to #8 distal cuffed [place].  Patient seen by Dermatology for back lesions.  One diagnosed as a seborrheic keratitis and the other a dermatofibroma.     11/27: MDR pseudomonas now on contact precautions.  Minimal secretions, no acute respiratory distress noted.  ID consulted - monitor off antibiotics for now.  Hx of recent dental abscess as per pts daughter - dental consulted.  S/p blood transfusion yesterday with good response - reached out to heme, waiting for response.  Frequent BMs, miralax and senna d/c'd.  Daughter updated in status/POC.

## 2023-11-27 NOTE — PROGRESS NOTE ADULT - SUBJECTIVE AND OBJECTIVE BOX
Patient is a 73y old  Female who presents with a chief complaint of peg tube   DATE OF SERVICE: 11-27-23      SUBJECTIVE / OVERNIGHT EVENTS: no new events      MEDICATIONS  (STANDING):  albuterol/ipratropium for Nebulization 3 milliLiter(s) Nebulizer every 6 hours  artificial  tears Solution 2 Drop(s) Both EYES four times a day  ascorbic acid 500 milliGRAM(s) Oral daily  bacitracin   Ointment 1 Application(s) Topical two times a day  bisacodyl Suppository 10 milliGRAM(s) Rectal once  calcium carbonate    500 mG (Tums) Chewable 1 Tablet(s) Chew every 12 hours  cephalexin 500 milliGRAM(s) Oral every 6 hours  chlorhexidine 0.12% Liquid 15 milliLiter(s) Oral Mucosa every 12 hours  chlorhexidine 4% Liquid 1 Application(s) Topical <User Schedule>  ciprofloxacin     Tablet 500 milliGRAM(s) Oral every 12 hours  dextrose 50% Injectable 50 milliLiter(s) IV Push once  enoxaparin Injectable 40 milliGRAM(s) SubCutaneous every 24 hours  escitalopram 10 milliGRAM(s) Oral daily  fentaNYL   Patch  25 MICROgram(s)/Hr 1 Patch Transdermal every 72 hours  gabapentin Solution 100 milliGRAM(s) Enteral Tube at bedtime  insulin glargine Injectable (LANTUS) 14 Unit(s) SubCutaneous every morning  insulin lispro (ADMELOG) corrective regimen sliding scale   SubCutaneous every 6 hours  levothyroxine 125 MICROGram(s) Oral <User Schedule>  lidocaine   4% Patch 1 Patch Transdermal daily  melatonin 3 milliGRAM(s) Oral at bedtime  multivitamin/minerals/iron Oral Solution (CENTRUM) 15 milliLiter(s) Oral daily  nystatin Powder 1 Application(s) Topical every 8 hours  oxybutynin 5 milliGRAM(s) Oral daily  pantoprazole   Suspension 40 milliGRAM(s) Enteral Tube daily  petrolatum Ophthalmic Ointment 1 Application(s) Both EYES four times a day  predniSONE   Tablet 5 milliGRAM(s) Oral daily  QUEtiapine 12.5 milliGRAM(s) Oral <User Schedule>  QUEtiapine 12.5 milliGRAM(s) Oral <User Schedule>  senna Syrup 10 milliLiter(s) Oral at bedtime  simethicone 80 milliGRAM(s) Chew four times a day  sodium chloride 3%  Inhalation 4 milliLiter(s) Inhalation every 12 hours  triamcinolone 0.1% Ointment 1 Application(s) Topical two times a day  zinc oxide 40% Paste 1 Application(s) Topical daily  zinc sulfate 220 milliGRAM(s) Oral daily    MEDICATIONS  (PRN):  acetaminophen   Oral Liquid .. 1000 milliGRAM(s) Enteral Tube every 6 hours PRN Temp greater or equal to 38C (100.4F), Mild Pain (1 - 3)  benzocaine 20% Spray 1 Spray(s) Topical four times a day PRN Cough  diphenhydrAMINE Elixir 25 milliGRAM(s) Oral every 6 hours PRN Rash and/or Itching  oxyCODONE    Solution 2.5 milliGRAM(s) Oral every 6 hours PRN Moderate Pain (4 - 6)  sodium chloride 0.65% Nasal 1 Spray(s) Both Nostrils every 6 hours PRN Nasal Congestion      Vital Signs Last 24 Hrs  T(C): 36.7 (27 Nov 2023 11:58), Max: 36.8 (27 Nov 2023 00:14)  T(F): 98.1 (27 Nov 2023 11:58), Max: 98.3 (27 Nov 2023 00:37)  HR: 79 (27 Nov 2023 11:58) (69 - 98)  BP: 149/65 (27 Nov 2023 11:58) (110/49 - 149/65)  BP(mean): --  RR: 18 (27 Nov 2023 11:58) (17 - 20)  SpO2: 100% (27 Nov 2023 11:58) (98% - 100%)    Parameters below as of 27 Nov 2023 11:58  Patient On (Oxygen Delivery Method): ventilator          I&O's Summary    17 Nov 2023 07:01  -  18 Nov 2023 07:00  --------------------------------------------------------  IN: 1300 mL / OUT: 950 mL / NET: 350 mL    18 Nov 2023 07:01  -  18 Nov 2023 17:32  --------------------------------------------------------  IN: 0 mL / OUT: 400 mL / NET: -400 mL        PHYSICAL EXAM:  GENERAL: NAD, well-developed  HEAD:  Atraumatic, Normocephalic  EYES: EOMI, PERRLA, conjunctiva and sclera clear  NECK: Supple, No JVD. On mech vent with a tracheostomy  CHEST/LUNG: Clear to auscultation bilaterally; No wheeze  HEART: Regular rate and rhythm; No murmurs, rubs, or gallops  ABDOMEN: Soft, Nontender, Nondistended; Bowel sounds present  EXTREMITIES:  2+ Peripheral Pulses, No clubbing, cyanosis, or edema  PSYCH: AAOx3  NEUROLOGY: non-focal. Functionally quadriplegic  SKIN: No rashes or lesions    LABS:                        7.5    7.48  )-----------( 134      ( 26 Nov 2023 07:27 )             25.6                                    8.6    8.89  )-----------( 126      ( 21 Nov 2023 07:05 )             29.1                8.9    9.19  )-----------( 124      ( 18 Nov 2023 09:15 )             30.4     11-18    137  |  99  |  29<H>  ----------------------------<  224<H>  4.1   |  28  |  <0.30<L>    Ca    10.0      18 Nov 2023 09:15  Mg     1.8     11-18    TPro  7.4  /  Alb  3.3  /  TBili  0.3  /  DBili  x   /  AST  19  /  ALT  18  /  AlkPhos  48  11-18          Urinalysis Basic - ( 18 Nov 2023 09:15 )    Color: x / Appearance: x / SG: x / pH: x  Gluc: 224 mg/dL / Ketone: x  / Bili: x / Urobili: x   Blood: x / Protein: x / Nitrite: x   Leuk Esterase: x / RBC: x / WBC x   Sq Epi: x / Non Sq Epi: x / Bacteria: x        RADIOLOGY & ADDITIONAL TESTS:    Imaging Personally Reviewed:    Consultant(s) Notes Reviewed:      Care Discussed with Consultants/Other Providers:

## 2023-11-27 NOTE — PROGRESS NOTE ADULT - SUBJECTIVE AND OBJECTIVE BOX
Chief Complaint:  Patient is a 73y old  Female who presents with a chief complaint of Bleeding from tracheostomy and PEG tube (2023 08:15)      Date of service 23 @ 20:12      Interval Events:     reconsulted for abdominal pain  At bedside pt daughter indicates pt pain improved following 2 large BM today  She is concerned about drainage from G tube site    Hospital Medications:  acetaminophen   Oral Liquid .. 1000 milliGRAM(s) Enteral Tube every 6 hours PRN  albuterol/ipratropium for Nebulization 3 milliLiter(s) Nebulizer every 6 hours  amLODIPine   Tablet 10 milliGRAM(s) Oral daily  artificial  tears Solution 2 Drop(s) Both EYES four times a day  ascorbic acid 500 milliGRAM(s) Oral daily  benzocaine 20% Spray 1 Spray(s) Topical four times a day PRN  calcium carbonate    500 mG (Tums) Chewable 1 Tablet(s) Chew every 12 hours  cephalexin 500 milliGRAM(s) Oral every 6 hours  chlorhexidine 0.12% Liquid 15 milliLiter(s) Oral Mucosa every 12 hours  chlorhexidine 4% Liquid 1 Application(s) Topical <User Schedule>  ciprofloxacin   IVPB 400 milliGRAM(s) IV Intermittent every 12 hours  dextrose 50% Injectable 12.5 Gram(s) IV Push once  dextrose 50% Injectable 25 Gram(s) IV Push once  dextrose 50% Injectable 25 Gram(s) IV Push once  dextrose 50% Injectable 50 milliLiter(s) IV Push once  dextrose Oral Gel 15 Gram(s) Oral once  diphenhydrAMINE Elixir 25 milliGRAM(s) Oral every 6 hours PRN  escitalopram 10 milliGRAM(s) Oral <User Schedule>  gabapentin Solution 100 milliGRAM(s) Enteral Tube at bedtime  glucagon  Injectable 1 milliGRAM(s) IntraMuscular once  guaifenesin/dextromethorphan Oral Liquid 10 milliLiter(s) Oral every 6 hours PRN  hemorrhoidal Ointment      insulin glargine Injectable (LANTUS) 30 Unit(s) SubCutaneous every morning  insulin lispro (ADMELOG) corrective regimen sliding scale   SubCutaneous every 6 hours  insulin regular  human recombinant 17 Unit(s) SubCutaneous <User Schedule>  levothyroxine 125 MICROGram(s) Oral <User Schedule>  lidocaine   4% Patch 1 Patch Transdermal daily  melatonin 3 milliGRAM(s) Oral at bedtime  multivitamin/minerals/iron Oral Solution (CENTRUM) 15 milliLiter(s) Oral daily  nystatin Powder 1 Application(s) Topical every 8 hours  oxybutynin 5 milliGRAM(s) Oral daily  pantoprazole   Suspension 40 milliGRAM(s) Enteral Tube <User Schedule>  petrolatum Ophthalmic Ointment 1 Application(s) Both EYES four times a day  predniSONE   Tablet 5 milliGRAM(s) Oral daily  QUEtiapine 12.5 milliGRAM(s) Oral <User Schedule>  simethicone 80 milliGRAM(s) Chew four times a day  sodium chloride 0.65% Nasal 1 Spray(s) Both Nostrils every 6 hours PRN  sodium chloride 3%  Inhalation 4 milliLiter(s) Inhalation every 12 hours  triamcinolone 0.1% Ointment 1 Application(s) Topical two times a day  zinc oxide 40% Paste 1 Application(s) Topical daily        Review of Systems:  General:  No wt loss, fevers, chills, night sweats, fatigue,   Eyes:  Good vision, no reported pain  ENT:  No sore throat, pain, runny nose, dysphagia  CV:  No pain, palpitations, hypo/hypertension  Resp:  No dyspnea, cough, tachypnea, wheezing  GI:  See HPI  :  No pain, bleeding, incontinence, nocturia  Muscle:  No pain, weakness  Neuro:  No weakness, tingling, memory problems  Psych:  No fatigue, insomnia, mood problems, depression  Endocrine:  No polyuria, polydipsia, cold/heat intolerance  Heme:  No petechiae, ecchymosis, easy bruisability  Integumentary:  No rash, edema    PHYSICAL EXAM:   Vital Signs:  Vital Signs Last 24 Hrs  T(C): 37.3 (2023 18:54), Max: 37.3 (2023 18:54)  T(F): 99.1 (2023 18:54), Max: 99.1 (2023 18:54)  HR: 85 (2023 18:54) (69 - 101)  BP: 130/65 (2023 18:54) (110/49 - 167/76)  BP(mean): --  RR: 23 (2023 18:54) (17 - 23)  SpO2: 100% (2023 18:54) (98% - 100%)    Parameters below as of 2023 18:54  Patient On (Oxygen Delivery Method): ventilator      Daily     Daily       PHYSICAL EXAM:     GENERAL:  Appears stated age, well-groomed, well-nourished, no distress  HEENT:  NC/AT,  conjunctivae anicteric, clear and pink,   NECK: supple, trachea midline  CHEST:  Full & symmetric excursion, no increased effort, breath sounds clear  HEART:  Regular rhythm, no JVD  ABDOMEN:  Soft, non-tender, non-distended, normoactive bowel sounds,  no masses , no hepatosplenomegaly  EXTREMITIES:  no cyanosis,clubbing or edema  SKIN:  No rash, erythema, or, ecchymoses, no jaundice  NEURO:  Alert, non-focal, no asterixis  PSYCH: Appropriate affect, oriented to place and time  RECTAL: Deferred      LABS Personally reviewed by me:                        9.3    6.94  )-----------( 124      ( 2023 06:44 )             30.4     Mean Cell Volume: 92.1 fl (- @ 06:44)        135  |  97  |  27<H>  ----------------------------<  122<H>  3.9   |  28  |  <0.30<L>    Ca    9.7      2023 07:24          Urinalysis Basic - ( 2023 15:56 )    Color: Yellow / Appearance: Clear / S.015 / pH: x  Gluc: x / Ketone: Trace mg/dL  / Bili: Negative / Urobili: 0.2 mg/dL   Blood: x / Protein: Negative mg/dL / Nitrite: Negative   Leuk Esterase: Trace / RBC: 1 /HPF / WBC 2 /HPF   Sq Epi: x / Non Sq Epi: 1 /HPF / Bacteria: Negative /HPF                              9.3    6.94  )-----------( 124      ( 2023 06:44 )             30.4                         7.9    8.24  )-----------( 133      ( 2023 14:46 )             26.6                         7.5    7.48  )-----------( 134      ( 2023 07:27 )             25.6                         8.1    7.46  )-----------( 123      ( 2023 07:03 )             27.1       Imaging personally reviewed by me:             Chief Complaint:  Patient is a 73y old  Female who presents with a chief complaint of Bleeding from tracheostomy and PEG tube (2023 08:15)      Date of service 23 @ 20:12      Interval Events:     reconsulted for abdominal pain  At bedside pt daughter indicates pt pain improved following 2 large BM today  She is concerned about drainage from G tube site    Hospital Medications:  acetaminophen   Oral Liquid .. 1000 milliGRAM(s) Enteral Tube every 6 hours PRN  albuterol/ipratropium for Nebulization 3 milliLiter(s) Nebulizer every 6 hours  amLODIPine   Tablet 10 milliGRAM(s) Oral daily  artificial  tears Solution 2 Drop(s) Both EYES four times a day  ascorbic acid 500 milliGRAM(s) Oral daily  benzocaine 20% Spray 1 Spray(s) Topical four times a day PRN  calcium carbonate    500 mG (Tums) Chewable 1 Tablet(s) Chew every 12 hours  cephalexin 500 milliGRAM(s) Oral every 6 hours  chlorhexidine 0.12% Liquid 15 milliLiter(s) Oral Mucosa every 12 hours  chlorhexidine 4% Liquid 1 Application(s) Topical <User Schedule>  ciprofloxacin   IVPB 400 milliGRAM(s) IV Intermittent every 12 hours  dextrose 50% Injectable 12.5 Gram(s) IV Push once  dextrose 50% Injectable 25 Gram(s) IV Push once  dextrose 50% Injectable 25 Gram(s) IV Push once  dextrose 50% Injectable 50 milliLiter(s) IV Push once  dextrose Oral Gel 15 Gram(s) Oral once  diphenhydrAMINE Elixir 25 milliGRAM(s) Oral every 6 hours PRN  escitalopram 10 milliGRAM(s) Oral <User Schedule>  gabapentin Solution 100 milliGRAM(s) Enteral Tube at bedtime  glucagon  Injectable 1 milliGRAM(s) IntraMuscular once  guaifenesin/dextromethorphan Oral Liquid 10 milliLiter(s) Oral every 6 hours PRN  hemorrhoidal Ointment      insulin glargine Injectable (LANTUS) 30 Unit(s) SubCutaneous every morning  insulin lispro (ADMELOG) corrective regimen sliding scale   SubCutaneous every 6 hours  insulin regular  human recombinant 17 Unit(s) SubCutaneous <User Schedule>  levothyroxine 125 MICROGram(s) Oral <User Schedule>  lidocaine   4% Patch 1 Patch Transdermal daily  melatonin 3 milliGRAM(s) Oral at bedtime  multivitamin/minerals/iron Oral Solution (CENTRUM) 15 milliLiter(s) Oral daily  nystatin Powder 1 Application(s) Topical every 8 hours  oxybutynin 5 milliGRAM(s) Oral daily  pantoprazole   Suspension 40 milliGRAM(s) Enteral Tube <User Schedule>  petrolatum Ophthalmic Ointment 1 Application(s) Both EYES four times a day  predniSONE   Tablet 5 milliGRAM(s) Oral daily  QUEtiapine 12.5 milliGRAM(s) Oral <User Schedule>  simethicone 80 milliGRAM(s) Chew four times a day  sodium chloride 0.65% Nasal 1 Spray(s) Both Nostrils every 6 hours PRN  sodium chloride 3%  Inhalation 4 milliLiter(s) Inhalation every 12 hours  triamcinolone 0.1% Ointment 1 Application(s) Topical two times a day  zinc oxide 40% Paste 1 Application(s) Topical daily        Review of Systems:  General:  No wt loss, fevers, chills, night sweats, fatigue,   Eyes:  Good vision, no reported pain  ENT:  No sore throat, pain, runny nose, dysphagia  CV:  No pain, palpitations, hypo/hypertension  Resp:  No dyspnea, cough, tachypnea, wheezing  GI:  See HPI  :  No pain, bleeding, incontinence, nocturia  Muscle:  No pain, weakness  Neuro:  No weakness, tingling, memory problems  Psych:  No fatigue, insomnia, mood problems, depression  Endocrine:  No polyuria, polydipsia, cold/heat intolerance  Heme:  No petechiae, ecchymosis, easy bruisability  Integumentary:  No rash, edema    PHYSICAL EXAM:   Vital Signs:  Vital Signs Last 24 Hrs  T(C): 37.3 (2023 18:54), Max: 37.3 (2023 18:54)  T(F): 99.1 (2023 18:54), Max: 99.1 (2023 18:54)  HR: 85 (2023 18:54) (69 - 101)  BP: 130/65 (2023 18:54) (110/49 - 167/76)  BP(mean): --  RR: 23 (2023 18:54) (17 - 23)  SpO2: 100% (2023 18:54) (98% - 100%)    Parameters below as of 2023 18:54  Patient On (Oxygen Delivery Method): ventilator      Daily     Daily       PHYSICAL EXAM:     GENERAL:  Appears stated age, well-groomed, well-nourished, no distress  HEENT:  NC/AT,  conjunctivae anicteric, clear and pink, trach  NECK: supple, trachea midline  CHEST:  Full & symmetric excursion, no increased effort, breath sounds clear  HEART:  Regular rhythm, no JVD  ABDOMEN:  Soft, non-tender, non-distended, normoactive bowel sounds,  no masses , no hepatosplenomegaly, G tube with granulation tissue  EXTREMITIES:  no cyanosis,clubbing or edema  SKIN:  No rash, erythema, or, ecchymoses, no jaundice  NEURO:  Alert, non-focal, no asterixis    RECTAL: brown stool with no fecal impaction      LABS Personally reviewed by me:                        9.3    6.94  )-----------( 124      ( 2023 06:44 )             30.4     Mean Cell Volume: 92.1 fl (- @ 06:44)        135  |  97  |  27<H>  ----------------------------<  122<H>  3.9   |  28  |  <0.30<L>    Ca    9.7      2023 07:24          Urinalysis Basic - ( 2023 15:56 )    Color: Yellow / Appearance: Clear / S.015 / pH: x  Gluc: x / Ketone: Trace mg/dL  / Bili: Negative / Urobili: 0.2 mg/dL   Blood: x / Protein: Negative mg/dL / Nitrite: Negative   Leuk Esterase: Trace / RBC: 1 /HPF / WBC 2 /HPF   Sq Epi: x / Non Sq Epi: 1 /HPF / Bacteria: Negative /HPF                              9.3    6.94  )-----------( 124      ( 2023 06:44 )             30.4                         7.9    8.24  )-----------( 133      ( 2023 14:46 )             26.6                         7.5    7.48  )-----------( 134      ( 2023 07:27 )             25.6                         8.1    7.46  )-----------( 123      ( 2023 07:03 )             27.1       Imaging personally reviewed by me:

## 2023-11-27 NOTE — PROVIDER CONTACT NOTE (OTHER) - ACTION/TREATMENT ORDERED:
Repeated finger stick after 7pm to give standing dose and pt blood glucose was 89. Regular insulin held

## 2023-11-27 NOTE — PROGRESS NOTE ADULT - ASSESSMENT
73 F PMH T2DM (A1c 7.5), HTN, HLD, anemia, hypothyroidism, RA, fibromyalgia, remote cerebral aneurysm repair, respiratory failure s/p Trach, s/p PEG, bedbound (trach change 8/18, PEG change 8/14), sacral wound, brought in for bleeding from trach and PEG site  Bleeding from trach  No fever, no leukocytosis  RVP neg  CT with PEG, sacral ulcer with osseous remodeling, patchy consolidative opacities  CXR with pneumonia  Sputum culture MSSA, pseudomonas  Treat for pneumonia  MR with evidence acute OM  Note wound care eval generally reassuring  Prolonged hospitalization, now with  Pseudomonas in sputum  Had low grade fever, however resp status is improved compared to prior; no secretions, no increased O2 requirements  WBC unchanged  For now would view the Pseudomonas as a trach colonizer and monitor for further fevers  Overall, PNA/tracheitis, bleeding from trach, sacral wound  - Cipro 500mg q 12 and Keflex 500mg q 6 to complete a total 6 weeks from start of Cefepime. Resolution of wound more dependent on wound care/frequent turning (as opposed to antibiotic course alone). Would not commit to prolonged IV course given anticipated modest benefit.  - Wound care to sacral wound  - Monitor for further signs infection  - Would not add on coverage for Pseudomonas currently as resp status is stable/improved with no specific signs pneumonia (Trach likely colonized with Pseudomonas)--alternatively if fevers intensify, or active resp symptoms, would have to add on coverage for this bacteria    Discussed with RCU team    Thomas Ahn MD  Contact on TEAMS messaging from 9am - 5pm  From 5pm-9am, on weekends, or if no response call [phone]

## 2023-11-27 NOTE — PROGRESS NOTE ADULT - NS ATTEND AMEND GEN_ALL_CORE FT
as above: temps o/n-100.7--fever w/up done  73F with a h/o DM, HTN, HLD, anemia, hypothyroidism, RA, fibromyalgia, remote cerebral aneurysm with repair, hypercapnic respiratory failure s/p tracheostomy/PEG, bedbound, sacral pressure ulcer. Admitted w/ bleeding from tracheostomy and PEG w/ associated abdominal pain, recent hx of auditory/visual hallucinations. Since admission, no further bleeding noted from either trach or PEG. Imaging showed mild thickening along PEG tube tract and sacral ulcer extending to coccyx suggestive of OM.        # Osteomyelitis  # Chronic hypoxemic RF  # oropharyngeal dysphagia  # acute on chronic pain  # Trach/Peg bleeding  # Tracheitis with Pseudomonas and serratia  # Hx of hallucination, anxiety    Neuro- mental status appropriate, attempting to follow commands,  very weakly sampson  3+  ortho- MRI showing sacral Osteo,  c/w wound care, no plans for surgery   ID-  s/p  course of Cefepime on 11/15 for sputum culture with PsA and serratia, now on Cipro and Keflex until December, f/u ID  ***** new temp o/n 100.7 11/26- repeat cx done-results pending   Wound care- f/u wound care reccs, improving wound, cont tx plan and outpt f/u  Resp- c/w Airway clearance -Duonebs and 3% Hypersal, chest PT,- PS trials during the day, vent at night, unable to tolerate TC yet  ENT- monitor for signs of trach site bleeding, none noted contact ENT if  bleeding noted (blood tinged due to suction trauma)  Pain- C/W pain control, multifactorial 2/2 to fibromyalgia as well as now osteo with pressure ulcer   GI- Peg irritation seen by GI again today, no bleeding and rec maalox gauze around site, H/H stable- negative AXR 11/22  Heme- Thrombocytopenia - improved has been stable since admission at current level    Neuro- Appreciate  follow up for delirium and hallucinations, patient calm and appropriate today - c/w Seroquel 12.5 mg in           early evening and  Lexapro in AM   optho- Corneal erythema improved, no evidence of discharge, completed a course of Cipro eye drops, c/w artificial tears  Derm- Appreciate derm consult for right back skin lesion- dermatofibroma, benign lesion. Mid back with irritated seborrheic             keratosis, topical triamcinolone 0.1% bid cont, outpt f/u w/ Derm  Endo- FS quite elvated despite increase in lantus and no other change in diet or meds (only topical steroid) will get Endo input  - DVT ppx- scd (lovenox d/c after d/w Daughter given the prior blood she had from trach and peg sites)     GOC: when pt more alert will re-discuss GOC (when lucid in past she had desired DNR approach)  Cesar Hernandez MD-Pulmonary   [phone] 73F with a h/o DM, HTN, HLD, anemia, hypothyroidism, RA, fibromyalgia, remote cerebral aneurysm with repair, hypercapnic respiratory failure s/p tracheostomy/PEG, bedbound, sacral pressure ulcer. Admitted w/ bleeding from tracheostomy and PEG w/ associated abdominal pain, recent hx of auditory/visual hallucinations. Since admission, no further bleeding noted from either trach or PEG. Imaging showed mild thickening along PEG tube tract and sacral ulcer extending to coccyx suggestive of OM.        # Osteomyelitis  # Chronic hypoxemic RF  # oropharyngeal dysphagia  # acute on chronic pain  # Trach/Peg bleeding  # Tracheitis with Pseudomonas and serratia  # Hx of hallucination, anxiety - particularly in setting of prior antibiotics    #Neuro - awake, alert, and interactive. moving all extremities  - Patient presently calm and following commands appropriately. Continue Seroquel  - Behavioral Health Follow-up as needed  #Cardiovascular - hemodynamically stable  #Pulmonary - acute on chronic hypoxemic respiratory failure - on home vent after extensive hospitalizations over the prior year since  being trached last December  - ENT follow-up as needed - previously changed to 8XLT trach - daughter was upset about previous trach changes but presently patient ventilating in stable fashion on current settings  - Maintain O2 sat > 90%  - Daughter requesting home CO2 monitoring device as unable have ABGs done at home as patient has a history of CO2 increase off ventilator  - Continue hypersal for now  #Gastro - oropharyngeal dysphagia with PEG tube in place  - Reported to have increased BM today/last few days per daughter with some abdominal distension. Will check AXR and follow-up GI  - History of c. diff previously treated per daughter - if diarrhea persists off stool softeners will need to repeat testing for this  #Infectious Disease - sacral osteo based on MRI - will need wound care follow-up to ensure no debridement required (per daughter had debridement in past at another hospitalization)  - Continue Cipro and Keflex ass per ID - with plan for 6 week course in total  - Per daughter, patient has had prior multiple infections during hospitalizations including prior pseudomonas, MRSA, E faecalis, and Klebsiella  - Sputum now with MDR Pseudomonas - ID follow-up however would consider holding off targeting this as respiratory status presently stable and no increase in secretions  - Follow-up repeat cultures which are in lab  - Daughter reports a recent dental abscess and being told by outpatient dentist that patient needed teeth extracted - Dental evaluation  #Hem/Onc - prior cytopenias in setting of antibiotics per patient's daughter -   - Required PRBC transfusion over the weekend for Hb < 8 but no clear signs of bleeding  - Hem/Onc follow-up discussed with Dr. Sullivan  - Daughter told by a prior Hematologist that her Hb must remain above 8 - though unclear whether this was in relation to any particular finding or just clinical gestalt  #Pain - continue current pain control - in setting of fibromyalgia and pain from wound  #Derm - previously seen by derm for skin lesions - mid back with irritated seborrheic keratosis - outpatient follow-up  #Endo - DM2 - continue insulin and adjust as needed for goal FS < 180  - Daughter frustrated with increase in insulin requirement without clear cause as well as timing of medications/IV drips and fingersticks  - Hyperglycemia is one of the reasons we have opted against increasing Prednisone at this time - daughter states she was told by a prior   Rheumatologist that it should be increased from 5 to 7.5mg or 10mg when sick - but no clear indication at this time  - Continue Synthroid and repeat TSH  #DVT proph - SCD  - Prior prophylactic AC stopped after concern for prior bleeding    Prognosis guarded    I had an extended conversation with patient's daughter today both over the phone this AM and in person this afternoon to discuss current care and plan, obtain prior history, and discuss her concerns and frustrations. I have spent > 60 minutes in these conversations. As part of conversation patient's daughter continues to reiterate her hope for mother's recovery and current code status with all current treatments. All questions answered. I also spoke with Dr. Daniel who has known patient for many years and he was part of our AM discussions with patient's daughter as well.  - ACP 18 minutes as part of these extended discussions.    Patient's daughter also indicates her stance that she will not agree with any discharge to a SNF when patient becomes appropriate for discharge. At this time she has many other clinical concerns but she has requested further discussions with the Case Management team. She tells me she has found an agency ([facility]) which will be able to provide 24 hour nursing if approved by insurance for [facility] care.   - The patient does require 2 person assist and would benefit f rom continuous CO2 monitoring at home.    However, she does not have a certified home health agency yet but is frustrated because she does not believe her mother requires any higher level of care than on prior discharges and she believes this is being denied/rejected from a financial/polictical motive. She tells me that her mother is entitled to the [facility] services including PT/OT/speech and other issues including vent management/etc would be managed by other providers. The patient continues to require wound care  - She has been told by some of the [facility] that she has reached out to that the hospital has to provide the applications for this. On my separate discussion with CM team they tell me these applications and requests have been made but reportedly denied from multiple [facility] agencies. Will need to confirm with CM team what options will be available.

## 2023-11-27 NOTE — CONSULT NOTE ADULT - SUBJECTIVE AND OBJECTIVE BOX
Chikis WoodsScar (MRN: [id number]): 72 y/o female inpatient presents with bleeding from tracheostomy and PEG tube. Dental consulted to rule out odontogenic source of fever.    HPI:  72 y/o F with PMHx of T2DM, HTN, HLD, anemia, hypothyroidism, RA, fibromyalgia, remote cerebral aneurysm repair, acute hypercapnic respiratory failure now with tracheostomy, PEG tube, and bedbound (trach change 8/18, PEG change 8/14), sacral pressure ulcer, BIBEMS from home for 6 day hx of bleeding from the tracheostomy and PEG tube with associated abdominal pain. Patient still having regular bowel movements, last one occurred prior to ED arrival. Was seen outpatient on 10/26 by critical care Dr. Zaki Gottlieb and apparently had cultures sent at that time. Patient also noted to have chronic sacral decubitous ulcer. Family endorsed one episode of fever, though was not febrile on evaluation. Daughter noted patient was recently experiencing auditory and visual hallucinations (seeing animals that were not there & hearing a "bomb" going off outside her home), though patient not actively hallucinating on evaluation.  ED course notable for CT neck imaging showing no evidence of active bleeding around the tracheostomy site, no hematomas, and no drainable collection around the tracheostomy site. CT abdomen/pelvis notable for gastrostomy tube localized to the stomach with mild thickening noted along the tract; a sacral decubitus ulcer extending to the coccyx with osseous remodeling, possibly representing osteomyelitis; and bibasilar patchy opacities with volume loss in the lungs, representing atelectasis vs infection. Patient admitted for respiratory support, wound care of tracheostomy and PEG, and management of presumed sacral osteomyelitis.    PAST MEDICAL & SURGICAL HISTORY:  Diabetes  Rheumatoid arthritis  Fibromyalgia  Hypothyroid  Hypertension  H/O tracheostomy  PEG (percutaneous endoscopic gastrostomy) status    MEDICATIONS  (STANDING):  albuterol/ipratropium for Nebulization 3 milliLiter(s) Nebulizer every 6 hours  amLODIPine   Tablet 10 milliGRAM(s) Oral daily  artificial  tears Solution 2 Drop(s) Both EYES four times a day  ascorbic acid 500 milliGRAM(s) Oral daily  calcium carbonate    500 mG (Tums) Chewable 1 Tablet(s) Chew every 12 hours  cephalexin 500 milliGRAM(s) Oral every 6 hours  chlorhexidine 0.12% Liquid 15 milliLiter(s) Oral Mucosa every 12 hours  chlorhexidine 4% Liquid 1 Application(s) Topical <User Schedule>  ciprofloxacin   IVPB 400 milliGRAM(s) IV Intermittent every 12 hours  dextrose 50% Injectable 12.5 Gram(s) IV Push once  dextrose 50% Injectable 25 Gram(s) IV Push once  dextrose 50% Injectable 50 milliLiter(s) IV Push once  dextrose 50% Injectable 25 Gram(s) IV Push once  dextrose Oral Gel 15 Gram(s) Oral once  escitalopram 10 milliGRAM(s) Oral <User Schedule>  gabapentin Solution 100 milliGRAM(s) Enteral Tube at bedtime  glucagon  Injectable 1 milliGRAM(s) IntraMuscular once  hemorrhoidal Ointment      insulin glargine Injectable (LANTUS) 30 Unit(s) SubCutaneous every morning  insulin lispro (ADMELOG) corrective regimen sliding scale   SubCutaneous every 6 hours  insulin regular  human recombinant 17 Unit(s) SubCutaneous <User Schedule>  levothyroxine 125 MICROGram(s) Oral <User Schedule>  lidocaine   4% Patch 1 Patch Transdermal daily  melatonin 3 milliGRAM(s) Oral at bedtime  multivitamin/minerals/iron Oral Solution (CENTRUM) 15 milliLiter(s) Oral daily  nystatin Powder 1 Application(s) Topical every 8 hours  oxybutynin 5 milliGRAM(s) Oral daily  pantoprazole   Suspension 40 milliGRAM(s) Enteral Tube <User Schedule>  petrolatum Ophthalmic Ointment 1 Application(s) Both EYES four times a day  predniSONE   Tablet 5 milliGRAM(s) Oral daily  QUEtiapine 12.5 milliGRAM(s) Oral <User Schedule>  simethicone 80 milliGRAM(s) Chew four times a day  sodium chloride 3%  Inhalation 4 milliLiter(s) Inhalation every 12 hours  triamcinolone 0.1% Ointment 1 Application(s) Topical two times a day  zinc oxide 40% Paste 1 Application(s) Topical daily    MEDICATIONS  (PRN):  acetaminophen   Oral Liquid .. 1000 milliGRAM(s) Enteral Tube every 6 hours PRN Temp greater or equal to 38C (100.4F), Mild Pain (1 - 3)  benzocaine 20% Spray 1 Spray(s) Topical four times a day PRN Cough  diphenhydrAMINE Elixir 25 milliGRAM(s) Oral every 6 hours PRN Rash and/or Itching  guaifenesin/dextromethorphan Oral Liquid 10 milliLiter(s) Oral every 6 hours PRN Cough  sodium chloride 0.65% Nasal 1 Spray(s) Both Nostrils every 6 hours PRN Nasal Congestion    Allergies  penicillin (Unknown)  heparin (Unknown)  Tagamet (Unknown)  pineapple (Unknown)  walnut (Unknown)  Pecan, Filbert, Hazelnut (Unknown)  Lyrica (Unknown)    Intolerances    Last Dental Visit:   Patient was last seen by outside dentist around October 2023. Full mouth radiographs were taken at the time. Patient was planned for full mouth extractions. Prior to hospitalization, patient's daughter endorses an infection on tooth #22 on October 2023 and that patient had taken antibiotics at that time.    EOE:  TMJ (-) clicks                    (-) pops                    (-) crepitus             Mandible: FROM             Facial bones and MOM: grossly intact             (-) trismus             (-) LAD             (-) swelling             (-) asymmetry             (-) palpation    IOE:  permanent dentition: multiple carious teeth, multiple missing teeth           hard/soft palate:  (-) palatal torus           tongue/FOM: WNL           labial/buccal mucosa: WNL           (-) percussion           (+) palpation on alveolar ridge in edentulous site #4-5           (-) swelling  Traumatic ulcer noted on alveolar ridge in edentulous site #4-5.  Grade 2 mobility #24-25. Grade 1 mobility in remaining mandibular dentition.  Erythematous and edematous gingiva noted in area of #10-13 and 22-23.   Generalized wear and broken down dentition.    Radiographs: Unable to take radiographs because patient was non-transportable per med team.      ASSESSMENT:   No signs of acute odontogenic infection based on clinical exam. No evidence of swelling, abscess, or fistula. However, chronic odontogenic infection cannot be fully ruled out without dental radiographs.  Traumatic ulcer in edentulous area #4-5 due to trauma from opposing dentition to the ridge.    Based on limited clinical exam, rule out other sources of fevers.    PROCEDURE:  Limited bedside clinical exam with patient's verbal consent. Discussed findings with patient and patient's daughter, and all questions were answered.    RECOMMENDATIONS:   1) Rule out other sources for fever  2) Dental F/U with outpatient dentist or [facility] clinic ([phone]) for comprehensive dental care.    Edyta Moreno DDS [id number] Chikis WoodsScar (MRN: [id number]): 72 y/o female inpatient presents with bleeding from tracheostomy and PEG tube. Dental consulted to rule out odontogenic source of fever.    HPI:  72 y/o F with PMHx of T2DM, HTN, HLD, anemia, hypothyroidism, RA, fibromyalgia, remote cerebral aneurysm repair, acute hypercapnic respiratory failure now with tracheostomy, PEG tube, and bedbound (trach change 8/18, PEG change 8/14), sacral pressure ulcer, BIBEMS from home for 6 day hx of bleeding from the tracheostomy and PEG tube with associated abdominal pain. Patient still having regular bowel movements, last one occurred prior to ED arrival. Was seen outpatient on 10/26 by critical care Dr. Zaki Gottlieb and apparently had cultures sent at that time. Patient also noted to have chronic sacral decubitous ulcer. Family endorsed one episode of fever, though was not febrile on evaluation. Daughter noted patient was recently experiencing auditory and visual hallucinations (seeing animals that were not there & hearing a "bomb" going off outside her home), though patient not actively hallucinating on evaluation.  ED course notable for CT neck imaging showing no evidence of active bleeding around the tracheostomy site, no hematomas, and no drainable collection around the tracheostomy site. CT abdomen/pelvis notable for gastrostomy tube localized to the stomach with mild thickening noted along the tract; a sacral decubitus ulcer extending to the coccyx with osseous remodeling, possibly representing osteomyelitis; and bibasilar patchy opacities with volume loss in the lungs, representing atelectasis vs infection. Patient admitted for respiratory support, wound care of tracheostomy and PEG, and management of presumed sacral osteomyelitis.    PAST MEDICAL & SURGICAL HISTORY:  Diabetes  Rheumatoid arthritis  Fibromyalgia  Hypothyroid  Hypertension  H/O tracheostomy  PEG (percutaneous endoscopic gastrostomy) status    MEDICATIONS  (STANDING):  albuterol/ipratropium for Nebulization 3 milliLiter(s) Nebulizer every 6 hours  amLODIPine   Tablet 10 milliGRAM(s) Oral daily  artificial  tears Solution 2 Drop(s) Both EYES four times a day  ascorbic acid 500 milliGRAM(s) Oral daily  calcium carbonate    500 mG (Tums) Chewable 1 Tablet(s) Chew every 12 hours  cephalexin 500 milliGRAM(s) Oral every 6 hours  chlorhexidine 0.12% Liquid 15 milliLiter(s) Oral Mucosa every 12 hours  chlorhexidine 4% Liquid 1 Application(s) Topical <User Schedule>  ciprofloxacin   IVPB 400 milliGRAM(s) IV Intermittent every 12 hours  dextrose 50% Injectable 12.5 Gram(s) IV Push once  dextrose 50% Injectable 25 Gram(s) IV Push once  dextrose 50% Injectable 50 milliLiter(s) IV Push once  dextrose 50% Injectable 25 Gram(s) IV Push once  dextrose Oral Gel 15 Gram(s) Oral once  escitalopram 10 milliGRAM(s) Oral <User Schedule>  gabapentin Solution 100 milliGRAM(s) Enteral Tube at bedtime  glucagon  Injectable 1 milliGRAM(s) IntraMuscular once  hemorrhoidal Ointment      insulin glargine Injectable (LANTUS) 30 Unit(s) SubCutaneous every morning  insulin lispro (ADMELOG) corrective regimen sliding scale   SubCutaneous every 6 hours  insulin regular  human recombinant 17 Unit(s) SubCutaneous <User Schedule>  levothyroxine 125 MICROGram(s) Oral <User Schedule>  lidocaine   4% Patch 1 Patch Transdermal daily  melatonin 3 milliGRAM(s) Oral at bedtime  multivitamin/minerals/iron Oral Solution (CENTRUM) 15 milliLiter(s) Oral daily  nystatin Powder 1 Application(s) Topical every 8 hours  oxybutynin 5 milliGRAM(s) Oral daily  pantoprazole   Suspension 40 milliGRAM(s) Enteral Tube <User Schedule>  petrolatum Ophthalmic Ointment 1 Application(s) Both EYES four times a day  predniSONE   Tablet 5 milliGRAM(s) Oral daily  QUEtiapine 12.5 milliGRAM(s) Oral <User Schedule>  simethicone 80 milliGRAM(s) Chew four times a day  sodium chloride 3%  Inhalation 4 milliLiter(s) Inhalation every 12 hours  triamcinolone 0.1% Ointment 1 Application(s) Topical two times a day  zinc oxide 40% Paste 1 Application(s) Topical daily    MEDICATIONS  (PRN):  acetaminophen   Oral Liquid .. 1000 milliGRAM(s) Enteral Tube every 6 hours PRN Temp greater or equal to 38C (100.4F), Mild Pain (1 - 3)  benzocaine 20% Spray 1 Spray(s) Topical four times a day PRN Cough  diphenhydrAMINE Elixir 25 milliGRAM(s) Oral every 6 hours PRN Rash and/or Itching  guaifenesin/dextromethorphan Oral Liquid 10 milliLiter(s) Oral every 6 hours PRN Cough  sodium chloride 0.65% Nasal 1 Spray(s) Both Nostrils every 6 hours PRN Nasal Congestion    Allergies  penicillin (Unknown)  heparin (Unknown)  Tagamet (Unknown)  pineapple (Unknown)  walnut (Unknown)  Pecan, Filbert, Hazelnut (Unknown)  Lyrica (Unknown)    Intolerances    Last Dental Visit:   Patient was last seen by outside dentist around October 2023. Full mouth radiographs were taken at the time. Patient was planned for full mouth extractions. Prior to hospitalization, patient's daughter endorses an infection on tooth #22 on October 2023 and that patient had taken antibiotics at that time.    EOE:  TMJ (-) clicks                    (-) pops                    (-) crepitus             Mandible: FROM             Facial bones and MOM: grossly intact             (-) trismus             (-) LAD             (-) swelling             (-) asymmetry             (-) palpation    IOE:  permanent dentition: multiple carious teeth, multiple missing teeth           hard/soft palate:  (-) palatal torus           tongue/FOM: WNL           labial/buccal mucosa: WNL           (-) percussion           (+) palpation on alveolar ridge in edentulous site #4-5           (-) swelling  Traumatic ulcer noted on alveolar ridge in edentulous site #4-5.  Grade 2 mobility #24-25. Grade 1 mobility in remaining mandibular dentition.  Erythematous and edematous gingiva noted in area of #10-13 and 22-23.   Generalized wear and broken down dentition.    Radiographs: Unable to take radiographs because patient was non-transportable per med team.      ASSESSMENT:   No signs of acute odontogenic infection based on clinical exam. No evidence of swelling, abscess, or fistula. However, chronic odontogenic infection cannot be fully ruled out without dental radiographs.  Traumatic ulcer in edentulous area #4-5 due to trauma from opposing dentition to the ridge.    Based on limited clinical exam, rule out other sources of fevers.    PROCEDURE:  Limited bedside clinical exam with patient's verbal consent. Discussed findings with patient and patient's daughter, and all questions were answered.    RECOMMENDATIONS:   1) Rule out other sources for fever  2) Dental F/U with outpatient dentist or [facility] clinic ([phone]) for comprehensive dental care.    Edyta Moreno DDS [id number] Chikis WoodsScar (MRN: [id number]): 72 y/o female inpatient presents with bleeding from tracheostomy and PEG tube. Dental consulted to rule out odontogenic source of fever.    HPI:  72 y/o F with PMHx of T2DM, HTN, HLD, anemia, hypothyroidism, RA, fibromyalgia, remote cerebral aneurysm repair, acute hypercapnic respiratory failure now with tracheostomy, PEG tube, and bedbound (trach change 8/18, PEG change 8/14), sacral pressure ulcer, BIBEMS from home for 6 day hx of bleeding from the tracheostomy and PEG tube with associated abdominal pain. Patient still having regular bowel movements, last one occurred prior to ED arrival. Was seen outpatient on 10/26 by critical care Dr. Zaki Gottlieb and apparently had cultures sent at that time. Patient also noted to have chronic sacral decubitous ulcer. Family endorsed one episode of fever, though was not febrile on evaluation. Daughter noted patient was recently experiencing auditory and visual hallucinations (seeing animals that were not there & hearing a "bomb" going off outside her home), though patient not actively hallucinating on evaluation.  ED course notable for CT neck imaging showing no evidence of active bleeding around the tracheostomy site, no hematomas, and no drainable collection around the tracheostomy site. CT abdomen/pelvis notable for gastrostomy tube localized to the stomach with mild thickening noted along the tract; a sacral decubitus ulcer extending to the coccyx with osseous remodeling, possibly representing osteomyelitis; and bibasilar patchy opacities with volume loss in the lungs, representing atelectasis vs infection. Patient admitted for respiratory support, wound care of tracheostomy and PEG, and management of presumed sacral osteomyelitis.    PAST MEDICAL & SURGICAL HISTORY:  Diabetes  Rheumatoid arthritis  Fibromyalgia  Hypothyroid  Hypertension  H/O tracheostomy  PEG (percutaneous endoscopic gastrostomy) status    MEDICATIONS  (STANDING):  albuterol/ipratropium for Nebulization 3 milliLiter(s) Nebulizer every 6 hours  amLODIPine   Tablet 10 milliGRAM(s) Oral daily  artificial  tears Solution 2 Drop(s) Both EYES four times a day  ascorbic acid 500 milliGRAM(s) Oral daily  calcium carbonate    500 mG (Tums) Chewable 1 Tablet(s) Chew every 12 hours  cephalexin 500 milliGRAM(s) Oral every 6 hours  chlorhexidine 0.12% Liquid 15 milliLiter(s) Oral Mucosa every 12 hours  chlorhexidine 4% Liquid 1 Application(s) Topical <User Schedule>  ciprofloxacin   IVPB 400 milliGRAM(s) IV Intermittent every 12 hours  dextrose 50% Injectable 12.5 Gram(s) IV Push once  dextrose 50% Injectable 25 Gram(s) IV Push once  dextrose 50% Injectable 50 milliLiter(s) IV Push once  dextrose 50% Injectable 25 Gram(s) IV Push once  dextrose Oral Gel 15 Gram(s) Oral once  escitalopram 10 milliGRAM(s) Oral <User Schedule>  gabapentin Solution 100 milliGRAM(s) Enteral Tube at bedtime  glucagon  Injectable 1 milliGRAM(s) IntraMuscular once  hemorrhoidal Ointment      insulin glargine Injectable (LANTUS) 30 Unit(s) SubCutaneous every morning  insulin lispro (ADMELOG) corrective regimen sliding scale   SubCutaneous every 6 hours  insulin regular  human recombinant 17 Unit(s) SubCutaneous <User Schedule>  levothyroxine 125 MICROGram(s) Oral <User Schedule>  lidocaine   4% Patch 1 Patch Transdermal daily  melatonin 3 milliGRAM(s) Oral at bedtime  multivitamin/minerals/iron Oral Solution (CENTRUM) 15 milliLiter(s) Oral daily  nystatin Powder 1 Application(s) Topical every 8 hours  oxybutynin 5 milliGRAM(s) Oral daily  pantoprazole   Suspension 40 milliGRAM(s) Enteral Tube <User Schedule>  petrolatum Ophthalmic Ointment 1 Application(s) Both EYES four times a day  predniSONE   Tablet 5 milliGRAM(s) Oral daily  QUEtiapine 12.5 milliGRAM(s) Oral <User Schedule>  simethicone 80 milliGRAM(s) Chew four times a day  sodium chloride 3%  Inhalation 4 milliLiter(s) Inhalation every 12 hours  triamcinolone 0.1% Ointment 1 Application(s) Topical two times a day  zinc oxide 40% Paste 1 Application(s) Topical daily    MEDICATIONS  (PRN):  acetaminophen   Oral Liquid .. 1000 milliGRAM(s) Enteral Tube every 6 hours PRN Temp greater or equal to 38C (100.4F), Mild Pain (1 - 3)  benzocaine 20% Spray 1 Spray(s) Topical four times a day PRN Cough  diphenhydrAMINE Elixir 25 milliGRAM(s) Oral every 6 hours PRN Rash and/or Itching  guaifenesin/dextromethorphan Oral Liquid 10 milliLiter(s) Oral every 6 hours PRN Cough  sodium chloride 0.65% Nasal 1 Spray(s) Both Nostrils every 6 hours PRN Nasal Congestion    Allergies  penicillin (Unknown)  heparin (Unknown)  Tagamet (Unknown)  pineapple (Unknown)  walnut (Unknown)  Pecan, Filbert, Hazelnut (Unknown)  Lyrica (Unknown)    Intolerances    Last Dental Visit:   Patient was last seen by outside dentist around October 2023. Full mouth radiographs were taken at the time. Patient was planned for full mouth extractions. Prior to hospitalization, patient's daughter endorses an infection on tooth #22 on October 2023 and that patient had taken antibiotics at that time.    EOE:  TMJ (-) clicks                    (-) pops                    (-) crepitus             Mandible: FROM             Facial bones and MOM: grossly intact             (-) trismus             (-) LAD             (-) swelling             (-) asymmetry             (-) palpation    IOE:  permanent dentition: multiple carious teeth, multiple missing teeth           hard/soft palate:  (-) palatal torus           tongue/FOM: WNL           labial/buccal mucosa: WNL           (-) percussion           (+) palpation on alveolar ridge in edentulous site #4-5           (-) swelling  Traumatic ulcer noted on alveolar ridge in edentulous site #4-5.  Grade 2 mobility #24-25. Grade 1 mobility in remaining mandibular dentition.  Erythematous and edematous gingiva noted in area of #10-13 and 22-23.   Generalized wear and broken down dentition.    Radiographs: Unable to take radiographs because patient was non-transportable per med team.      ASSESSMENT:   No signs of acute odontogenic infection based on clinical exam. No evidence of swelling, abscess, or fistula. However, chronic odontogenic infection cannot be fully ruled out without dental radiographs.  Traumatic ulcer in edentulous area #4-5 due to trauma from opposing dentition to the ridge.    Based on limited clinical exam, rule out other sources of fevers.    PROCEDURE:  Limited bedside clinical exam with patient's verbal consent. Discussed findings with patient and patient's daughter, and all questions were answered.    RECOMMENDATIONS:   1) Rule out other sources for fever  2) Dental F/U with outpatient dentist or [facility] clinic ([phone]) for comprehensive dental care.    Edyta Moreno DDS [id number]

## 2023-11-27 NOTE — PROGRESS NOTE ADULT - SUBJECTIVE AND OBJECTIVE BOX
CC: F/U for positive culture finding    Called back to see patient for positive culture. Patient had low grade fever. No change in resp status, no secretions.    Allergies  penicillin (Unknown)  heparin (Unknown)  Tagamet (Unknown)  pineapple (Unknown)  walnut (Unknown)  Pecan, Filbert, Hazelnut (Unknown)  Lyrica (Unknown)    ANTIMICROBIALS:  cephalexin 500 every 6 hours  ciprofloxacin   IVPB 400 every 12 hours    PE:    Vital Signs Last 24 Hrs  T(C): 36.7 (2023 11:58), Max: 36.8 (2023 00:14)  T(F): 98.1 (2023 11:58), Max: 98.3 (2023 00:37)  HR: 79 (2023 11:58) (69 - 98)  BP: 149/65 (2023 11:58) (110/49 - 149/65)  RR: 18 (2023 11:58) (17 - 20)  SpO2: 100% (2023 11:58) (98% - 100%)    Gen: AOx0-1, NAD, non-toxic  CV: Nontachycardic  Resp: Breathing comfortably, trach  Abd: Soft, nontender  IV/Skin: No thrombophlebitis    LABS:                        9.3    6.94  )-----------( 124      ( 2023 06:44 )             30.4         135  |  97  |  27<H>  ----------------------------<  122<H>  3.9   |  28  |  <0.30<L>    Ca    9.7      2023 07:24    Urinalysis Basic - ( 2023 15:56 )    Color: Yellow / Appearance: Clear / S.015 / pH: x  Gluc: x / Ketone: Trace mg/dL  / Bili: Negative / Urobili: 0.2 mg/dL   Blood: x / Protein: Negative mg/dL / Nitrite: Negative   Leuk Esterase: Trace / RBC: 1 /HPF / WBC 2 /HPF   Sq Epi: x / Non Sq Epi: 1 /HPF / Bacteria: Negative /HPF    MICROBIOLOGY:    .Sputum Sputum  23   Numerous Pseudomonas aeruginosa  Normal Respiratory Shanda present  --    No squamous epithelial cells seen per low power field  No polymorphonuclear cells seen seen per low power field  Moderate Gram Negative Rods seen per oil power field  Few Gram Positive Rods seen per oil power field    .Blood Blood-Peripheral  23   No growth at 24 hours  --  --    .Blood Blood-Peripheral  23   No growth at 24 hours  --  --    .Sputum Sputum  23   Numerous Mixed gram negative rods including  Numerous Pseudomonas aeruginosa (Carbapenem Resistant)  Normal Respiratory Shanda present  --  Pseudomonas aeruginosa (Carbapenem Resistant)    .Sputum Sputum  23   Numerous Pseudomonas aeruginosa  Numerous Serratia marcescens  --  Pseudomonas aeruginosa  Serratia marcescens    .Blood Blood-Heart  23   No growth at 5 days  --  --    (otherwise reviewed)    RADIOLOGY:     XR:      FINDINGS:  A G-tube overlies the upper abdomen. Nonobstructive bowel gas pattern.  There is no evidence of intraperitoneal free air on this single supine   radiograph.  The visualized osseous structures demonstrate no acute pathology.    IMPRESSION:  Nonobstructive bowel gas pattern.

## 2023-11-28 DIAGNOSIS — E78.49 OTHER HYPERLIPIDEMIA: ICD-10-CM

## 2023-11-28 DIAGNOSIS — E11.65 TYPE 2 DIABETES MELLITUS WITH HYPERGLYCEMIA: ICD-10-CM

## 2023-11-28 LAB
-  AMPICILLIN: SIGNIFICANT CHANGE UP
-  AMPICILLIN: SIGNIFICANT CHANGE UP
-  AZTREONAM: SIGNIFICANT CHANGE UP
-  AZTREONAM: SIGNIFICANT CHANGE UP
-  CEFEPIME: SIGNIFICANT CHANGE UP
-  CEFEPIME: SIGNIFICANT CHANGE UP
-  CEFTAZIDIME: SIGNIFICANT CHANGE UP
-  CEFTAZIDIME: SIGNIFICANT CHANGE UP
-  CIPROFLOXACIN: SIGNIFICANT CHANGE UP
-  DAPTOMYCIN: SIGNIFICANT CHANGE UP
-  DAPTOMYCIN: SIGNIFICANT CHANGE UP
-  IMIPENEM: SIGNIFICANT CHANGE UP
-  IMIPENEM: SIGNIFICANT CHANGE UP
-  LEVOFLOXACIN: SIGNIFICANT CHANGE UP
-  LINEZOLID: SIGNIFICANT CHANGE UP
-  LINEZOLID: SIGNIFICANT CHANGE UP
-  MEROPENEM: SIGNIFICANT CHANGE UP
-  MEROPENEM: SIGNIFICANT CHANGE UP
-  NITROFURANTOIN: SIGNIFICANT CHANGE UP
-  NITROFURANTOIN: SIGNIFICANT CHANGE UP
-  PIPERACILLIN/TAZOBACTAM: SIGNIFICANT CHANGE UP
-  PIPERACILLIN/TAZOBACTAM: SIGNIFICANT CHANGE UP
-  TETRACYCLINE: SIGNIFICANT CHANGE UP
-  TETRACYCLINE: SIGNIFICANT CHANGE UP
-  VANCOMYCIN: SIGNIFICANT CHANGE UP
-  VANCOMYCIN: SIGNIFICANT CHANGE UP
ANION GAP SERPL CALC-SCNC: -22 MMOL/L — LOW (ref 5–17)
ANION GAP SERPL CALC-SCNC: -22 MMOL/L — LOW (ref 5–17)
ANION GAP SERPL CALC-SCNC: 9 MMOL/L — SIGNIFICANT CHANGE UP (ref 5–17)
ANION GAP SERPL CALC-SCNC: 9 MMOL/L — SIGNIFICANT CHANGE UP (ref 5–17)
BUN SERPL-MCNC: 19 MG/DL — SIGNIFICANT CHANGE UP (ref 7–23)
CALCIUM SERPL-MCNC: 9.1 MG/DL — SIGNIFICANT CHANGE UP (ref 8.4–10.5)
CALCIUM SERPL-MCNC: 9.1 MG/DL — SIGNIFICANT CHANGE UP (ref 8.4–10.5)
CALCIUM SERPL-MCNC: 9.5 MG/DL — SIGNIFICANT CHANGE UP (ref 8.4–10.5)
CALCIUM SERPL-MCNC: 9.5 MG/DL — SIGNIFICANT CHANGE UP (ref 8.4–10.5)
CHLORIDE SERPL-SCNC: 101 MMOL/L — SIGNIFICANT CHANGE UP (ref 96–108)
CHLORIDE SERPL-SCNC: 101 MMOL/L — SIGNIFICANT CHANGE UP (ref 96–108)
CHLORIDE SERPL-SCNC: 99 MMOL/L — SIGNIFICANT CHANGE UP (ref 96–108)
CHLORIDE SERPL-SCNC: 99 MMOL/L — SIGNIFICANT CHANGE UP (ref 96–108)
CO2 SERPL-SCNC: 28 MMOL/L — SIGNIFICANT CHANGE UP (ref 22–31)
CREAT SERPL-MCNC: <0.3 MG/DL — LOW (ref 0.5–1.3)
CULTURE RESULTS: ABNORMAL
EGFR: 113 ML/MIN/1.73M2 — SIGNIFICANT CHANGE UP
GLUCOSE BLDC GLUCOMTR-MCNC: 170 MG/DL — HIGH (ref 70–99)
GLUCOSE BLDC GLUCOMTR-MCNC: 170 MG/DL — HIGH (ref 70–99)
GLUCOSE BLDC GLUCOMTR-MCNC: 187 MG/DL — HIGH (ref 70–99)
GLUCOSE BLDC GLUCOMTR-MCNC: 187 MG/DL — HIGH (ref 70–99)
GLUCOSE BLDC GLUCOMTR-MCNC: 292 MG/DL — HIGH (ref 70–99)
GLUCOSE BLDC GLUCOMTR-MCNC: 292 MG/DL — HIGH (ref 70–99)
GLUCOSE BLDC GLUCOMTR-MCNC: 89 MG/DL — SIGNIFICANT CHANGE UP (ref 70–99)
GLUCOSE BLDC GLUCOMTR-MCNC: 89 MG/DL — SIGNIFICANT CHANGE UP (ref 70–99)
GLUCOSE SERPL-MCNC: 311 MG/DL — HIGH (ref 70–99)
GLUCOSE SERPL-MCNC: 311 MG/DL — HIGH (ref 70–99)
GLUCOSE SERPL-MCNC: 87 MG/DL — SIGNIFICANT CHANGE UP (ref 70–99)
GLUCOSE SERPL-MCNC: 87 MG/DL — SIGNIFICANT CHANGE UP (ref 70–99)
HCT VFR BLD CALC: 32.2 % — LOW (ref 34.5–45)
HCT VFR BLD CALC: 32.2 % — LOW (ref 34.5–45)
HGB BLD-MCNC: 9.5 G/DL — LOW (ref 11.5–15.5)
HGB BLD-MCNC: 9.5 G/DL — LOW (ref 11.5–15.5)
INTERPRETATION SERPL IFE-IMP: SIGNIFICANT CHANGE UP
INTERPRETATION SERPL IFE-IMP: SIGNIFICANT CHANGE UP
MAGNESIUM SERPL-MCNC: 1.7 MG/DL — SIGNIFICANT CHANGE UP (ref 1.6–2.6)
MAGNESIUM SERPL-MCNC: 1.7 MG/DL — SIGNIFICANT CHANGE UP (ref 1.6–2.6)
MAGNESIUM SERPL-MCNC: 1.8 MG/DL — SIGNIFICANT CHANGE UP (ref 1.6–2.6)
MAGNESIUM SERPL-MCNC: 1.8 MG/DL — SIGNIFICANT CHANGE UP (ref 1.6–2.6)
MCHC RBC-ENTMCNC: 27.6 PG — SIGNIFICANT CHANGE UP (ref 27–34)
MCHC RBC-ENTMCNC: 27.6 PG — SIGNIFICANT CHANGE UP (ref 27–34)
MCHC RBC-ENTMCNC: 29.5 GM/DL — LOW (ref 32–36)
MCHC RBC-ENTMCNC: 29.5 GM/DL — LOW (ref 32–36)
MCV RBC AUTO: 93.6 FL — SIGNIFICANT CHANGE UP (ref 80–100)
MCV RBC AUTO: 93.6 FL — SIGNIFICANT CHANGE UP (ref 80–100)
METHOD TYPE: SIGNIFICANT CHANGE UP
NRBC # BLD: 0 /100 WBCS — SIGNIFICANT CHANGE UP (ref 0–0)
NRBC # BLD: 0 /100 WBCS — SIGNIFICANT CHANGE UP (ref 0–0)
ORGANISM # SPEC MICROSCOPIC CNT: ABNORMAL
PHOSPHATE SERPL-MCNC: 4.3 MG/DL — SIGNIFICANT CHANGE UP (ref 2.5–4.5)
PHOSPHATE SERPL-MCNC: 4.3 MG/DL — SIGNIFICANT CHANGE UP (ref 2.5–4.5)
PLATELET # BLD AUTO: 130 K/UL — LOW (ref 150–400)
PLATELET # BLD AUTO: 130 K/UL — LOW (ref 150–400)
POTASSIUM SERPL-MCNC: 3.3 MMOL/L — LOW (ref 3.5–5.3)
POTASSIUM SERPL-MCNC: 3.3 MMOL/L — LOW (ref 3.5–5.3)
POTASSIUM SERPL-MCNC: 4.7 MMOL/L — SIGNIFICANT CHANGE UP (ref 3.5–5.3)
POTASSIUM SERPL-MCNC: 4.7 MMOL/L — SIGNIFICANT CHANGE UP (ref 3.5–5.3)
POTASSIUM SERPL-SCNC: 3.3 MMOL/L — LOW (ref 3.5–5.3)
POTASSIUM SERPL-SCNC: 3.3 MMOL/L — LOW (ref 3.5–5.3)
POTASSIUM SERPL-SCNC: 4.7 MMOL/L — SIGNIFICANT CHANGE UP (ref 3.5–5.3)
POTASSIUM SERPL-SCNC: 4.7 MMOL/L — SIGNIFICANT CHANGE UP (ref 3.5–5.3)
RAPID RVP RESULT: SIGNIFICANT CHANGE UP
RAPID RVP RESULT: SIGNIFICANT CHANGE UP
RBC # BLD: 3.44 M/UL — LOW (ref 3.8–5.2)
RBC # BLD: 3.44 M/UL — LOW (ref 3.8–5.2)
RBC # FLD: 16.7 % — HIGH (ref 10.3–14.5)
RBC # FLD: 16.7 % — HIGH (ref 10.3–14.5)
SARS-COV-2 RNA SPEC QL NAA+PROBE: SIGNIFICANT CHANGE UP
SARS-COV-2 RNA SPEC QL NAA+PROBE: SIGNIFICANT CHANGE UP
SODIUM SERPL-SCNC: 136 MMOL/L — SIGNIFICANT CHANGE UP (ref 135–145)
SODIUM SERPL-SCNC: 136 MMOL/L — SIGNIFICANT CHANGE UP (ref 135–145)
SODIUM SERPL-SCNC: 139 MMOL/L — SIGNIFICANT CHANGE UP (ref 135–145)
SODIUM SERPL-SCNC: 139 MMOL/L — SIGNIFICANT CHANGE UP (ref 135–145)
SPECIMEN SOURCE: SIGNIFICANT CHANGE UP
T4 FREE SERPL-MCNC: 1.2 NG/DL — SIGNIFICANT CHANGE UP (ref 0.9–1.8)
T4 FREE SERPL-MCNC: 1.2 NG/DL — SIGNIFICANT CHANGE UP (ref 0.9–1.8)
TSH SERPL-MCNC: 2.8 UIU/ML — SIGNIFICANT CHANGE UP (ref 0.27–4.2)
TSH SERPL-MCNC: 2.8 UIU/ML — SIGNIFICANT CHANGE UP (ref 0.27–4.2)
WBC # BLD: 8.05 K/UL — SIGNIFICANT CHANGE UP (ref 3.8–10.5)
WBC # BLD: 8.05 K/UL — SIGNIFICANT CHANGE UP (ref 3.8–10.5)
WBC # FLD AUTO: 8.05 K/UL — SIGNIFICANT CHANGE UP (ref 3.8–10.5)
WBC # FLD AUTO: 8.05 K/UL — SIGNIFICANT CHANGE UP (ref 3.8–10.5)

## 2023-11-28 PROCEDURE — 99232 SBSQ HOSP IP/OBS MODERATE 35: CPT

## 2023-11-28 PROCEDURE — 99233 SBSQ HOSP IP/OBS HIGH 50: CPT

## 2023-11-28 RX ORDER — MAGNESIUM SULFATE 500 MG/ML
2 VIAL (ML) INJECTION ONCE
Refills: 0 | Status: COMPLETED | OUTPATIENT
Start: 2023-11-28 | End: 2023-11-28

## 2023-11-28 RX ORDER — INSULIN GLARGINE 100 [IU]/ML
20 INJECTION, SOLUTION SUBCUTANEOUS EVERY MORNING
Refills: 0 | Status: DISCONTINUED | OUTPATIENT
Start: 2023-11-29 | End: 2023-12-07

## 2023-11-28 RX ORDER — POTASSIUM CHLORIDE 20 MEQ
40 PACKET (EA) ORAL ONCE
Refills: 0 | Status: COMPLETED | OUTPATIENT
Start: 2023-11-28 | End: 2023-11-28

## 2023-11-28 RX ADMIN — CHLORHEXIDINE GLUCONATE 15 MILLILITER(S): 213 SOLUTION TOPICAL at 06:27

## 2023-11-28 RX ADMIN — Medication 5 MILLIGRAM(S): at 06:26

## 2023-11-28 RX ADMIN — Medication 6: at 13:09

## 2023-11-28 RX ADMIN — Medication 3 MILLILITER(S): at 23:45

## 2023-11-28 RX ADMIN — Medication 3 MILLILITER(S): at 05:58

## 2023-11-28 RX ADMIN — NYSTATIN CREAM 1 APPLICATION(S): 100000 CREAM TOPICAL at 06:27

## 2023-11-28 RX ADMIN — Medication 1 TABLET(S): at 18:15

## 2023-11-28 RX ADMIN — Medication 125 MICROGRAM(S): at 06:26

## 2023-11-28 RX ADMIN — SODIUM CHLORIDE 4 MILLILITER(S): 9 INJECTION INTRAMUSCULAR; INTRAVENOUS; SUBCUTANEOUS at 05:58

## 2023-11-28 RX ADMIN — PANTOPRAZOLE SODIUM 40 MILLIGRAM(S): 20 TABLET, DELAYED RELEASE ORAL at 06:26

## 2023-11-28 RX ADMIN — CHLORHEXIDINE GLUCONATE 1 APPLICATION(S): 213 SOLUTION TOPICAL at 06:28

## 2023-11-28 RX ADMIN — SIMETHICONE 80 MILLIGRAM(S): 80 TABLET, CHEWABLE ORAL at 06:26

## 2023-11-28 RX ADMIN — Medication 1 TABLET(S): at 06:26

## 2023-11-28 RX ADMIN — NYSTATIN CREAM 1 APPLICATION(S): 100000 CREAM TOPICAL at 15:57

## 2023-11-28 RX ADMIN — LIDOCAINE 1 PATCH: 4 CREAM TOPICAL at 18:18

## 2023-11-28 RX ADMIN — NYSTATIN CREAM 1 APPLICATION(S): 100000 CREAM TOPICAL at 19:43

## 2023-11-28 RX ADMIN — Medication 1 APPLICATION(S): at 18:19

## 2023-11-28 RX ADMIN — Medication 1 APPLICATION(S): at 13:07

## 2023-11-28 RX ADMIN — Medication 500 MILLIGRAM(S): at 18:15

## 2023-11-28 RX ADMIN — Medication 25 GRAM(S): at 13:05

## 2023-11-28 RX ADMIN — Medication 3 MILLILITER(S): at 17:37

## 2023-11-28 RX ADMIN — ZINC OXIDE 1 APPLICATION(S): 200 OINTMENT TOPICAL at 13:31

## 2023-11-28 RX ADMIN — Medication 15 MILLILITER(S): at 13:06

## 2023-11-28 RX ADMIN — Medication 2 DROP(S): at 13:07

## 2023-11-28 RX ADMIN — INSULIN HUMAN 17 UNIT(S): 100 INJECTION, SOLUTION SUBCUTANEOUS at 13:12

## 2023-11-28 RX ADMIN — Medication 5 MILLIGRAM(S): at 13:06

## 2023-11-28 RX ADMIN — Medication 3 MILLIGRAM(S): at 19:43

## 2023-11-28 RX ADMIN — INSULIN HUMAN 17 UNIT(S): 100 INJECTION, SOLUTION SUBCUTANEOUS at 18:17

## 2023-11-28 RX ADMIN — ESCITALOPRAM OXALATE 10 MILLIGRAM(S): 10 TABLET, FILM COATED ORAL at 09:01

## 2023-11-28 RX ADMIN — LIDOCAINE 1 PATCH: 4 CREAM TOPICAL at 01:13

## 2023-11-28 RX ADMIN — PHENYLEPHRINE-SHARK LIVER OIL-MINERAL OIL-PETROLATUM RECTAL OINTMENT 1 APPLICATION(S): at 13:31

## 2023-11-28 RX ADMIN — SIMETHICONE 80 MILLIGRAM(S): 80 TABLET, CHEWABLE ORAL at 18:15

## 2023-11-28 RX ADMIN — QUETIAPINE FUMARATE 12.5 MILLIGRAM(S): 200 TABLET, FILM COATED ORAL at 18:18

## 2023-11-28 RX ADMIN — GABAPENTIN 100 MILLIGRAM(S): 400 CAPSULE ORAL at 19:42

## 2023-11-28 RX ADMIN — Medication 1 APPLICATION(S): at 18:18

## 2023-11-28 RX ADMIN — Medication 1000 MILLIGRAM(S): at 19:42

## 2023-11-28 RX ADMIN — Medication 500 MILLIGRAM(S): at 06:27

## 2023-11-28 RX ADMIN — CHLORHEXIDINE GLUCONATE 15 MILLILITER(S): 213 SOLUTION TOPICAL at 18:15

## 2023-11-28 RX ADMIN — Medication 0.25 MILLIGRAM(S): at 21:33

## 2023-11-28 RX ADMIN — SODIUM CHLORIDE 4 MILLILITER(S): 9 INJECTION INTRAMUSCULAR; INTRAVENOUS; SUBCUTANEOUS at 17:37

## 2023-11-28 RX ADMIN — Medication 3 MILLILITER(S): at 11:40

## 2023-11-28 RX ADMIN — Medication 2 DROP(S): at 18:19

## 2023-11-28 RX ADMIN — Medication 40 MILLIEQUIVALENT(S): at 13:07

## 2023-11-28 RX ADMIN — SIMETHICONE 80 MILLIGRAM(S): 80 TABLET, CHEWABLE ORAL at 13:06

## 2023-11-28 RX ADMIN — LIDOCAINE 1 PATCH: 4 CREAM TOPICAL at 13:12

## 2023-11-28 RX ADMIN — Medication 2: at 18:16

## 2023-11-28 RX ADMIN — Medication 1000 MILLIGRAM(S): at 20:12

## 2023-11-28 RX ADMIN — INSULIN GLARGINE 30 UNIT(S): 100 INJECTION, SOLUTION SUBCUTANEOUS at 09:00

## 2023-11-28 RX ADMIN — Medication 200 MILLIGRAM(S): at 06:26

## 2023-11-28 RX ADMIN — Medication 500 MILLIGRAM(S): at 13:06

## 2023-11-28 RX ADMIN — Medication 1 APPLICATION(S): at 06:27

## 2023-11-28 RX ADMIN — Medication 2 DROP(S): at 06:27

## 2023-11-28 RX ADMIN — AMLODIPINE BESYLATE 10 MILLIGRAM(S): 2.5 TABLET ORAL at 06:27

## 2023-11-28 RX ADMIN — Medication 500 MILLIGRAM(S): at 13:21

## 2023-11-28 RX ADMIN — Medication 200 MILLIGRAM(S): at 18:14

## 2023-11-28 RX ADMIN — Medication 1 APPLICATION(S): at 06:28

## 2023-11-28 NOTE — PROGRESS NOTE ADULT - SUBJECTIVE AND OBJECTIVE BOX
HPI:  72 y/o F with PMHx of T2DM, HTN, HLD, anemia, hypothyroidism, RA, fibromyalgia, remote cerebral aneurysm repair, acute hypercapnic respiratory failure now with tracheostomy, PEG tube, and bedbound (trach change 8/18, PEG change 8/14), sacral pressure ulcer, BIBEMS from home 10/28/23for 6 day hx of bleeding from the tracheostomy and PEG tube with associated abdominal pain.   Seen for thrombocytopenia. She has had a mild thrombocytopenia essentially the whole admission and it was present on admission. There are no records from prior to admission.  She also has anemia.        PAST MEDICAL & SURGICAL HISTORY:  Diabetes      Rheumatoid arthritis      Fibromyalgia      Hypothyroid      Hypertension      H/O tracheostomy      PEG (percutaneous endoscopic gastrostomy) status        Allergies    penicillin (Unknown)  heparin (Unknown)  Tagamet (Unknown)  pineapple (Unknown)  walnut (Unknown)  Pecan, Filbert, Hazelnut (Unknown)  Lyrica (Unknown)    Intolerances      Social History:  lives at home with family  dependent for all ADL  no toxic habits  FULL CODE (28 Oct 2023 04:18)    Medications:  acetaminophen   Oral Liquid .. 1000 milliGRAM(s) Enteral Tube every 6 hours PRN Temp greater or equal to 38C (100.4F), Mild Pain (1 - 3)  albuterol/ipratropium for Nebulization 3 milliLiter(s) Nebulizer every 6 hours  amLODIPine   Tablet 10 milliGRAM(s) Oral daily  artificial  tears Solution 2 Drop(s) Both EYES four times a day  ascorbic acid 500 milliGRAM(s) Oral daily  benzocaine 20% Spray 1 Spray(s) Topical four times a day PRN Cough  calcium carbonate    500 mG (Tums) Chewable 1 Tablet(s) Chew every 12 hours  cephalexin 500 milliGRAM(s) Oral every 6 hours  chlorhexidine 0.12% Liquid 15 milliLiter(s) Oral Mucosa every 12 hours  chlorhexidine 4% Liquid 1 Application(s) Topical <User Schedule>  ciprofloxacin   IVPB 400 milliGRAM(s) IV Intermittent every 12 hours  dextrose 50% Injectable 12.5 Gram(s) IV Push once  dextrose 50% Injectable 25 Gram(s) IV Push once  dextrose 50% Injectable 50 milliLiter(s) IV Push once  dextrose 50% Injectable 25 Gram(s) IV Push once  dextrose Oral Gel 15 Gram(s) Oral once  diphenhydrAMINE Elixir 25 milliGRAM(s) Oral every 6 hours PRN Rash and/or Itching  escitalopram 10 milliGRAM(s) Oral <User Schedule>  gabapentin Solution 100 milliGRAM(s) Enteral Tube at bedtime  glucagon  Injectable 1 milliGRAM(s) IntraMuscular once  guaifenesin/dextromethorphan Oral Liquid 10 milliLiter(s) Oral every 6 hours PRN Cough  hemorrhoidal Ointment      hemorrhoidal Ointment 1 Application(s) Rectal daily  insulin glargine Injectable (LANTUS) 30 Unit(s) SubCutaneous every morning  insulin lispro (ADMELOG) corrective regimen sliding scale   SubCutaneous every 6 hours  insulin regular  human recombinant 17 Unit(s) SubCutaneous <User Schedule>  levothyroxine 125 MICROGram(s) Oral <User Schedule>  lidocaine   4% Patch 1 Patch Transdermal daily  magnesium sulfate  IVPB 2 Gram(s) IV Intermittent once  melatonin 3 milliGRAM(s) Oral at bedtime  multivitamin/minerals/iron Oral Solution (CENTRUM) 15 milliLiter(s) Oral daily  nystatin Powder 1 Application(s) Topical every 8 hours  oxybutynin 5 milliGRAM(s) Oral daily  pantoprazole   Suspension 40 milliGRAM(s) Enteral Tube <User Schedule>  petrolatum Ophthalmic Ointment 1 Application(s) Both EYES four times a day  potassium chloride   Solution 40 milliEquivalent(s) Oral once  predniSONE   Tablet 5 milliGRAM(s) Oral daily  QUEtiapine 12.5 milliGRAM(s) Oral <User Schedule>  simethicone 80 milliGRAM(s) Chew four times a day  sodium chloride 0.65% Nasal 1 Spray(s) Both Nostrils every 6 hours PRN Nasal Congestion  sodium chloride 3%  Inhalation 4 milliLiter(s) Inhalation every 12 hours  triamcinolone 0.1% Ointment 1 Application(s) Topical two times a day  zinc oxide 40% Paste 1 Application(s) Topical daily    Labs:  CBC Full  -  ( 28 Nov 2023 06:36 )  WBC Count : 8.05 K/uL  RBC Count : 3.44 M/uL  Hemoglobin : 9.5 g/dL  Hematocrit : 32.2 %  Platelet Count - Automated : 130 K/uL  Mean Cell Volume : 93.6 fl  Mean Cell Hemoglobin : 27.6 pg  Mean Cell Hemoglobin Concentration : 29.5 gm/dL  Auto Neutrophil # : x  Auto Lymphocyte # : x  Auto Monocyte # : x  Auto Eosinophil # : x  Auto Basophil # : x  Auto Neutrophil % : x  Auto Lymphocyte % : x  Auto Monocyte % : x  Auto Eosinophil % : x  Auto Basophil % : x  Platelet Count - Automated: 140 K/uL (10.09.20 @ 12:44)   Platelet Count - Automated: 136 K/uL (10.09.20 @ 00:21)   Platelet Count - Automated: 115 K/uL (10.08.20 @ 00:40)   Platelet Count - Automated: 132 K/uL (10.07.20 @ 12:39)   Platelet Count - Automated: 80: VERIFIED BY SMEAR. 10(9)L (05.27.09 @ 22:11)   Platelet Count - Automated: 80: VERIFIED BY SMEAR. 10(9)L (05.27.09 @ 22:11)   Hemoglobin: 7.9 g/dL (10.10.20 @ 00:53)   Hemoglobin: 8.1 g/dL (10.09.20 @ 12:44)   Hemoglobin: 8.1 g/dL (10.09.20 @ 00:21)   Hemoglobin: 9.5 g/dL (10.08.20 @ 00:40)   Hemoglobin: 9.4 g/dL (10.07.20 @ 12:39)   Direct Amisha IgG: Positive (11.26.23 @ 18:17)   Direct Amisha IgG: Positive (11.26.23 @ 18:17)   Direct Amisha C3: Negative (11.26.23 @ 18:17)   Eluate Antibody 1: Eluate Negative (11.26.23 @ 18:18) Antibody Identification (11.26.23 @ 16:14)   Antibody ID 1_1: Anti-E  Antibody ID 1_2: Anti-K  Antibody ID 1_3: Anti-c (little)  Antibody ID 1_4: Nonspecific Antibody Newly identified anti-c. Hx of anti-E, anti-K and non-specific   reactivity. HONG positive (poly 1+ IgG 1+ C3 neg). Eluate non reactive.   Antigen negative full cross match required. Z01.85  Heparin-PF4 AB Interp: Negative (11.24.23 @ 06:42)   11-28    139  |  101  |  19  ----------------------------<  87  3.3<L>   |  28  |  <0.30<L>    Ca    9.5      28 Nov 2023 06:36  Phos  4.3     11-28  Mg     1.7     11-28    Ferritin: 1114 ng/mL (11.24.23 @ 06:42)   Vitamin B12, Serum: 1026 pg/mL (11.24.23 @ 06:42)   Haptoglobin, Serum: 282 mg/dL (11.24.23 @ 06:42)   Lactate Dehydrogenase, Serum: 215 U/L (11.24.23 @ 06:42)     Radiology:             ROS:  Patient comfortable without distress  No SOB or chest pain  No palpitation  No abdominal pain, diarrhaea or constipation  No weakness of extremities  No skin changes or swelling of legs  Rest of the comprehensive ROS was negative  Vital Signs Last 24 Hrs  T(C): 36.6 (28 Nov 2023 04:42), Max: 37.3 (27 Nov 2023 18:54)  T(F): 97.9 (28 Nov 2023 04:42), Max: 99.1 (27 Nov 2023 18:54)  HR: 81 (28 Nov 2023 08:20) (73 - 101)  BP: 140/64 (28 Nov 2023 04:42) (130/65 - 167/76)  BP(mean): --  RR: 12 (28 Nov 2023 04:42) (12 - 23)  SpO2: 100% (28 Nov 2023 08:20) (100% - 100%)    Parameters below as of 28 Nov 2023 05:58  Patient On (Oxygen Delivery Method): ventilator        Physical exam:  Patient alert   Has trach  No distress  CVS: S1, S2   Chest: bilateral breath sound without rales  Abdomen: soft, not tender, no organomegaly or masses. PEG +  CNS: No focal neuro deficit  Musculoskeletal:  Normal range of motion  Skin: Decubiti as in chart    Assessment and Plan:

## 2023-11-28 NOTE — PROGRESS NOTE ADULT - SUBJECTIVE AND OBJECTIVE BOX
[facility]-- WOUND TEAM -- FOLLOW UP NOTE  --------------------------------------------------------------------------------    24 hour events/subjective:          Diet:  Diet, NPO with Tube Feed:   Tube Feeding Modality: Gastrostomy  [employer] glucose support 1.2  Total Volume for 24 Hours (mL): 1176  Continuous  Starting Tube Feed Rate mL per Hour: 56  Increase Tube Feed Rate by (mL): 0  Until Goal Tube Feed Rate (mL per Hour): 56  Tube Feed Duration (in Hours): 21  Tube Feed Start Time: 06:00  Tube Feed Stop Time: 03:00  Free Water Flush  Pump   Rate (mL per Hour): 10   Frequency: Every 2 Hours  Alfred(7 Gm Arginine/7 Gm Glut/1.2 Gm HMB     Qty per Day:  2 (11-24-23 @ 17:37)      ROS: pt unable to offer    ALLERGIES & MEDICATIONS  --------------------------------------------------------------------------------  Allergies  penicillin (Unknown  heparin (Unknown)  Tagamet (Unknown)  pineapple (Unknown)  walnut (Unknown)  Andressa Rawls Hazelcharlene (Unknown)  Lyrica (Unknown)        STANDING INPATIENT MEDICATIONS  albuterol/ipratropium for Nebulization 3 milliLiter(s) Nebulizer every 6 hours  amLODIPine   Tablet 10 milliGRAM(s) Oral daily  artificial  tears Solution 2 Drop(s) Both EYES four times a day  ascorbic acid 500 milliGRAM(s) Oral daily  calcium carbonate    500 mG (Tums) Chewable 1 Tablet(s) Chew every 12 hours  cephalexin 500 milliGRAM(s) Oral every 6 hours  chlorhexidine 0.12% Liquid 15 milliLiter(s) Oral Mucosa every 12 hours  chlorhexidine 4% Liquid 1 Application(s) Topical <User Schedule>  ciprofloxacin   IVPB 400 milliGRAM(s) IV Intermittent every 12 hours  dextrose 50% Injectable 25 Gram(s) IV Push once  dextrose 50% Injectable 25 Gram(s) IV Push once  dextrose 50% Injectable 50 milliLiter(s) IV Push once  dextrose 50% Injectable 12.5 Gram(s) IV Push once  dextrose Oral Gel 15 Gram(s) Oral once  escitalopram 10 milliGRAM(s) Oral <User Schedule>  gabapentin Solution 100 milliGRAM(s) Enteral Tube at bedtime  glucagon  Injectable 1 milliGRAM(s) IntraMuscular once   hemorrhoidal Ointment 1 Application(s) Rectal daily  insulin lispro (ADMELOG) corrective regimen sliding scale   SubCutaneous every 6 hours  insulin regular  human recombinant 17 Unit(s) SubCutaneous <User Schedule>  levothyroxine 125 MICROGram(s) Oral <User Schedule>  lidocaine   4% Patch 1 Patch Transdermal daily  melatonin 3 milliGRAM(s) Oral at bedtime  multivitamin/minerals/iron Oral Solution (CENTRUM) 15 milliLiter(s) Oral daily  nystatin Powder 1 Application(s) Topical every 8 hours  oxybutynin 5 milliGRAM(s) Oral daily  pantoprazole   Suspension 40 milliGRAM(s) Enteral Tube <User Schedule>  petrolatum Ophthalmic Ointment 1 Application(s) Both EYES four times a day  predniSONE   Tablet 5 milliGRAM(s) Oral daily  QUEtiapine 12.5 milliGRAM(s) Oral <User Schedule>  simethicone 80 milliGRAM(s) Chew four times a day  sodium chloride 3%  Inhalation 4 milliLiter(s) Inhalation every 12 hours  triamcinolone 0.1% Ointment 1 Application(s) Topical two times a day  zinc oxide 40% Paste 1 Application(s) Topical daily      PRN INPATIENT MEDICATION  acetaminophen   Oral Liquid .. 1000 milliGRAM(s) Enteral Tube every 6 hours PRN  benzocaine 20% Spray 1 Spray(s) Topical four times a day PRN  diphenhydrAMINE Elixir 25 milliGRAM(s) Oral every 6 hours PRN  guaifenesin/dextromethorphan Oral Liquid 10 milliLiter(s) Oral every 6 hours PRN  sodium chloride 0.65% Nasal 1 Spray(s) Both Nostrils every 6 hours PRN        VITALS/PHYSICAL EXAM  --------------------------------------------------------------------------------  T(C): 36.5 (11-28-23 @ 14:41), Max: 36.6 (11-28-23 @ 00:40)  HR: 91 (11-28-23 @ 17:53) (73 - 92)  BP: 131/65 (11-28-23 @ 14:41) (131/65 - 143/67)  RR: 12 (11-28-23 @ 04:42) (12 - 22)  SpO2: 100% (11-28-23 @ 17:53) (100% - 100%)  Wt(kg): --                LABS/ CULTURES/ RADIOLOGY:              9.5    8.05  >-----------<  130      [11-28-23 @ 06:36]              32.2     136  |  99  |  19  ----------------------------<  311      [11-28-23 @ 14:03]  4.7   |  28  |  <0.30        Ca     9.1     [11-28-23 @ 14:03]      Mg     1.8     [11-28-23 @ 14:03]      Phos  4.3     [11-28-23 @ 06:36]        CAPILLARY BLOOD GLUCOSE  POCT Blood Glucose.: 187 mg/dL (28 Nov 2023 17:29)  POCT Blood Glucose.: 292 mg/dL (28 Nov 2023 13:04)  POCT Blood Glucose.: 170 mg/dL (28 Nov 2023 08:58)  POCT Blood Glucose.: 89 mg/dL (28 Nov 2023 05:32)  POCT Blood Glucose.: 98 mg/dL (27 Nov 2023 23:45)  POCT Blood Glucose.: 89 mg/dL (27 Nov 2023 19:38)      A1C with Estimated Average Glucose Result: 7.5 % (10-28-23 @ 07:18)    Culture - Blood (collected 11-26-23 @ 07:32)  Source: .Blood Blood-Peripheral  Preliminary Report (11-28-23 @ 10:01):    No growth at 48 Hours    Culture - Blood (collected 11-26-23 @ 07:31)  Source: .Blood Blood-Peripheral  Preliminary Report (11-28-23 @ 10:01):    No growth at 48 Hours    Culture - Blood (collected 11-22-23 @ 03:59)  Source: .Blood Blood-Peripheral  Final Report (11-27-23 @ 10:01):    No growth at 5 days         [facility]-- WOUND TEAM -- FOLLOW UP NOTE  --------------------------------------------------------------------------------    24 hour events/subjective:    afebrile  more alert  tolerating TF w/o vomiting  tolerating dressings     no odor pain excess drainage  incontinent- multiple loose BM       Diet:  Diet, NPO with Tube Feed:   Tube Feeding Modality: Gastrostomy  [employer] glucose support 1.2  Total Volume for 24 Hours (mL): 1176  Continuous  Starting Tube Feed Rate mL per Hour: 56  Increase Tube Feed Rate by (mL): 0  Until Goal Tube Feed Rate (mL per Hour): 56  Tube Feed Duration (in Hours): 21  Tube Feed Start Time: 06:00  Tube Feed Stop Time: 03:00  Free Water Flush  Pump   Rate (mL per Hour): 10   Frequency: Every 2 Hours  Alfred(7 Gm Arginine/7 Gm Glut/1.2 Gm HMB     Qty per Day:  2 (11-24-23 @ 17:37)      ROS: pt unable to offer    ALLERGIES & MEDICATIONS  --------------------------------------------------------------------------------  Allergies  penicillin (Unknown  heparin (Unknown)  Tagamet (Unknown)  pineapple (Unknown)  walnut (Unknown)  Andressa Rawls Hazelcharlene (Unknown)  Lyrica (Unknown)        STANDING INPATIENT MEDICATIONS  albuterol/ipratropium for Nebulization 3 milliLiter(s) Nebulizer every 6 hours  amLODIPine   Tablet 10 milliGRAM(s) Oral daily  artificial  tears Solution 2 Drop(s) Both EYES four times a day  ascorbic acid 500 milliGRAM(s) Oral daily  calcium carbonate    500 mG (Tums) Chewable 1 Tablet(s) Chew every 12 hours  cephalexin 500 milliGRAM(s) Oral every 6 hours  chlorhexidine 0.12% Liquid 15 milliLiter(s) Oral Mucosa every 12 hours  chlorhexidine 4% Liquid 1 Application(s) Topical <User Schedule>  ciprofloxacin   IVPB 400 milliGRAM(s) IV Intermittent every 12 hours  dextrose 50% Injectable 25 Gram(s) IV Push once  dextrose 50% Injectable 25 Gram(s) IV Push once  dextrose 50% Injectable 50 milliLiter(s) IV Push once  dextrose 50% Injectable 12.5 Gram(s) IV Push once  dextrose Oral Gel 15 Gram(s) Oral once  escitalopram 10 milliGRAM(s) Oral <User Schedule>  gabapentin Solution 100 milliGRAM(s) Enteral Tube at bedtime  glucagon  Injectable 1 milliGRAM(s) IntraMuscular once   hemorrhoidal Ointment 1 Application(s) Rectal daily  insulin lispro (ADMELOG) corrective regimen sliding scale   SubCutaneous every 6 hours  insulin regular  human recombinant 17 Unit(s) SubCutaneous <User Schedule>  levothyroxine 125 MICROGram(s) Oral <User Schedule>  lidocaine   4% Patch 1 Patch Transdermal daily  melatonin 3 milliGRAM(s) Oral at bedtime  multivitamin/minerals/iron Oral Solution (CENTRUM) 15 milliLiter(s) Oral daily  nystatin Powder 1 Application(s) Topical every 8 hours  oxybutynin 5 milliGRAM(s) Oral daily  pantoprazole   Suspension 40 milliGRAM(s) Enteral Tube <User Schedule>  petrolatum Ophthalmic Ointment 1 Application(s) Both EYES four times a day  predniSONE   Tablet 5 milliGRAM(s) Oral daily  QUEtiapine 12.5 milliGRAM(s) Oral <User Schedule>  simethicone 80 milliGRAM(s) Chew four times a day  sodium chloride 3%  Inhalation 4 milliLiter(s) Inhalation every 12 hours  triamcinolone 0.1% Ointment 1 Application(s) Topical two times a day  zinc oxide 40% Paste 1 Application(s) Topical daily      PRN INPATIENT MEDICATION  acetaminophen   Oral Liquid .. 1000 milliGRAM(s) Enteral Tube every 6 hours PRN  benzocaine 20% Spray 1 Spray(s) Topical four times a day PRN  diphenhydrAMINE Elixir 25 milliGRAM(s) Oral every 6 hours PRN  guaifenesin/dextromethorphan Oral Liquid 10 milliLiter(s) Oral every 6 hours PRN  sodium chloride 0.65% Nasal 1 Spray(s) Both Nostrils every 6 hours PRN        VITALS/PHYSICAL EXAM  --------------------------------------------------------------------------------  T(C): 36.5 (11-28-23 @ 14:41), Max: 36.6 (11-28-23 @ 00:40)  HR: 91 (11-28-23 @ 17:53) (73 - 92)  BP: 131/65 (11-28-23 @ 14:41) (131/65 - 143/67)  RR: 12 (11-28-23 @ 04:42) (12 - 22)  SpO2: 100% (11-28-23 @ 17:53) (100% - 100%)  Wt(kg): --      NAD,  Alert, frail,  WD/ WN/ WG  Total Care Sport bed  HEENT:  NC/AT, EOMI, sclera clear, mucosa moist, throat clear, trach       w/o secretions or peritrach skin irration  Gastrointestinal: soft NT/ND (+)PEG w/o drainage or skin irration  : (+) primafit  Neurology:  nonverbal, no follow commands, paraplegic  Psych: calm/ appropriate  Musculoskeletal:  pROM, no deformities/ contractures   Vascular: BLE equally warm,  no cyanosis, clubbing nor acute ischemia  Skin:  moist w/ good turgor  Sacral stage 4pressure injury  4.5cm x 2.5cm x 0.5cm   moist granular wound bed   palpable but not exposed bone  no blistering   (+) serosanguinous drainage  No odor, erythema, increased warmth, tenderness, induration, fluctuance, nor crepitus          LABS/ CULTURES/ RADIOLOGY:              9.5    8.05  >-----------<  130      [11-28-23 @ 06:36]              32.2     136  |  99  |  19  ----------------------------<  311      [11-28-23 @ 14:03]  4.7   |  28  |  <0.30        Ca     9.1     [11-28-23 @ 14:03]      Mg     1.8     [11-28-23 @ 14:03]      Phos  4.3     [11-28-23 @ 06:36]        CAPILLARY BLOOD GLUCOSE  POCT Blood Glucose.: 187 mg/dL (28 Nov 2023 17:29)  POCT Blood Glucose.: 292 mg/dL (28 Nov 2023 13:04)  POCT Blood Glucose.: 170 mg/dL (28 Nov 2023 08:58)  POCT Blood Glucose.: 89 mg/dL (28 Nov 2023 05:32)  POCT Blood Glucose.: 98 mg/dL (27 Nov 2023 23:45)  POCT Blood Glucose.: 89 mg/dL (27 Nov 2023 19:38)      A1C with Estimated Average Glucose Result: 7.5 % (10-28-23 @ 07:18)    Culture - Blood (collected 11-26-23 @ 07:32)  Source: .Blood Blood-Peripheral  Preliminary Report (11-28-23 @ 10:01):    No growth at 48 Hours    Culture - Blood (collected 11-26-23 @ 07:31)  Source: .Blood Blood-Peripheral  Preliminary Report (11-28-23 @ 10:01):    No growth at 48 Hours    Culture - Blood (collected 11-22-23 @ 03:59)  Source: .Blood Blood-Peripheral  Final Report (11-27-23 @ 10:01):    No growth at 5 days

## 2023-11-28 NOTE — PROGRESS NOTE ADULT - ASSESSMENT
73 year old female with respiratory failure s/p trach and PEG, sacral osteomyelitis.    Abdominal pain   now improved  the patient is nontender on exam. She has no fecal impaction on LARON.      Erratic nature of bowel movements  there are numerous factors ranging from debility, antibiotics, and tube feeds.   laxatives to be given only PRN; bowel movements will continue to be inconsistent due to the aforementioned factors.    PEG   some drainage and oozing of blood is expected from the hypergranulation tissue at the PEG site, and that barrier creams and frequent dressing changes may help.  above discussed in detail with patients daughter

## 2023-11-28 NOTE — PROGRESS NOTE ADULT - SUBJECTIVE AND OBJECTIVE BOX
CC: F/U for Fever    Saw/spoke to patient. Unchanged, no new events.    Allergies  penicillin (Unknown)  heparin (Unknown)  Tagamet (Unknown)  pineapple (Unknown)  walnut (Unknown)  Pecan, Filbert, Hazelnut (Unknown)  Lyrica (Unknown)    ANTIMICROBIALS:  cephalexin 500 every 6 hours  ciprofloxacin   IVPB 400 every 12 hours    PE:    Vital Signs Last 24 Hrs  T(C): 36.5 (28 Nov 2023 14:41), Max: 37.3 (27 Nov 2023 18:54)  T(F): 97.7 (28 Nov 2023 14:41), Max: 99.1 (27 Nov 2023 18:54)  HR: 76 (28 Nov 2023 14:41) (73 - 101)  BP: 131/65 (28 Nov 2023 14:41) (130/65 - 167/76)  RR: 12 (28 Nov 2023 04:42) (12 - 23)  SpO2: 100% (28 Nov 2023 14:41) (100% - 100%)    Gen: AOx0-1, NAD  CV: Nontachycardic  Resp: Breathing comfortably, RA  Abd: Soft, nontender  IV/Skin: No thrombophlebitis    LABS:                        9.5    8.05  )-----------( 130      ( 28 Nov 2023 06:36 )             32.2     11-28    136  |  99  |  19  ----------------------------<  311<H>  4.7   |  28  |  <0.30<L>    Ca    9.1      28 Nov 2023 14:03  Phos  4.3     11-28  Mg     1.8     11-28    Urinalysis Basic - ( 28 Nov 2023 14:03 )    Color: x / Appearance: x / SG: x / pH: x  Gluc: 311 mg/dL / Ketone: x  / Bili: x / Urobili: x   Blood: x / Protein: x / Nitrite: x   Leuk Esterase: x / RBC: x / WBC x   Sq Epi: x / Non Sq Epi: x / Bacteria: x    MICROBIOLOGY:    Clean Catch Clean Catch (Midstream)  11-26-23   10,000 - 49,000 CFU/mL Enterococcus faecium  --  --    .Sputum Sputum  11-26-23   Numerous Pseudomonas aeruginosa  Normal Respiratory Shanda present  --  Pseudomonas aeruginosa    .Blood Blood-Peripheral  11-26-23   No growth at 48 Hours  --  --    .Blood Blood-Peripheral  11-26-23   No growth at 48 Hours  --  --    .Sputum Sputum  11-22-23   Numerous Mixed gram negative rods including  Numerous Pseudomonas aeruginosa (Carbapenem Resistant)  Normal Respiratory Shanda present  --  Pseudomonas aeruginosa (Carbapenem Resistant)    .Sputum Sputum  11-06-23   Numerous Pseudomonas aeruginosa  Numerous Serratia marcescens  --  Pseudomonas aeruginosa  Serratia marcescens    .Blood Blood-Heart  11-05-23   No growth at 5 days  --  --    (otherwise reviewed)    RADIOLOGY:    11/27    FINDINGS:  G-tube overlying the abdomen and stomach.  Nonobstructive bowel gas pattern.  There is no evidence of intraperitoneal free air on this single supine   radiograph.  The visualized osseous structures demonstrate no acute pathology.    IMPRESSION:  Nonobstructive bowel gas pattern.

## 2023-11-28 NOTE — PROGRESS NOTE ADULT - NUTRITIONAL ASSESSMENT
Diet, NPO with Tube Feed:   Tube Feeding Modality: Gastrostomy  [employer] glucose support 1.2  Total Volume for 24 Hours (mL): 1176  Continuous  Starting Tube Feed Rate {mL per Hour}: 56  Increase Tube Feed Rate by (mL): 0  Until Goal Tube Feed Rate (mL per Hour): 56  Tube Feed Duration (in Hours): 21  Tube Feed Start Time: 06:00  Tube Feed Stop Time: 03:00  Free Water Flush  Pump   Rate (mL per Hour): 10   Frequency: Every 2 Hours  Alfred(7 Gm Arginine/7 Gm Glut/1.2 Gm HMB     Qty per Day:  2 (11-24-23 @ 17:37) [Active]    Need RD consult to assess a more physiological TF time and rate of administration.

## 2023-11-28 NOTE — PROGRESS NOTE ADULT - SUBJECTIVE AND OBJECTIVE BOX
Patient is a 73y old  Female who presents with a chief complaint of peg tube   DATE OF SERVICE: 11-28-23      SUBJECTIVE / OVERNIGHT EVENTS: no new events      MEDICATIONS  (STANDING):  albuterol/ipratropium for Nebulization 3 milliLiter(s) Nebulizer every 6 hours  artificial  tears Solution 2 Drop(s) Both EYES four times a day  ascorbic acid 500 milliGRAM(s) Oral daily  bacitracin   Ointment 1 Application(s) Topical two times a day  bisacodyl Suppository 10 milliGRAM(s) Rectal once  calcium carbonate    500 mG (Tums) Chewable 1 Tablet(s) Chew every 12 hours  cephalexin 500 milliGRAM(s) Oral every 6 hours  chlorhexidine 0.12% Liquid 15 milliLiter(s) Oral Mucosa every 12 hours  chlorhexidine 4% Liquid 1 Application(s) Topical <User Schedule>  ciprofloxacin     Tablet 500 milliGRAM(s) Oral every 12 hours  dextrose 50% Injectable 50 milliLiter(s) IV Push once  enoxaparin Injectable 40 milliGRAM(s) SubCutaneous every 24 hours  escitalopram 10 milliGRAM(s) Oral daily  fentaNYL   Patch  25 MICROgram(s)/Hr 1 Patch Transdermal every 72 hours  gabapentin Solution 100 milliGRAM(s) Enteral Tube at bedtime  insulin glargine Injectable (LANTUS) 14 Unit(s) SubCutaneous every morning  insulin lispro (ADMELOG) corrective regimen sliding scale   SubCutaneous every 6 hours  levothyroxine 125 MICROGram(s) Oral <User Schedule>  lidocaine   4% Patch 1 Patch Transdermal daily  melatonin 3 milliGRAM(s) Oral at bedtime  multivitamin/minerals/iron Oral Solution (CENTRUM) 15 milliLiter(s) Oral daily  nystatin Powder 1 Application(s) Topical every 8 hours  oxybutynin 5 milliGRAM(s) Oral daily  pantoprazole   Suspension 40 milliGRAM(s) Enteral Tube daily  petrolatum Ophthalmic Ointment 1 Application(s) Both EYES four times a day  predniSONE   Tablet 5 milliGRAM(s) Oral daily  QUEtiapine 12.5 milliGRAM(s) Oral <User Schedule>  QUEtiapine 12.5 milliGRAM(s) Oral <User Schedule>  senna Syrup 10 milliLiter(s) Oral at bedtime  simethicone 80 milliGRAM(s) Chew four times a day  sodium chloride 3%  Inhalation 4 milliLiter(s) Inhalation every 12 hours  triamcinolone 0.1% Ointment 1 Application(s) Topical two times a day  zinc oxide 40% Paste 1 Application(s) Topical daily  zinc sulfate 220 milliGRAM(s) Oral daily    MEDICATIONS  (PRN):  acetaminophen   Oral Liquid .. 1000 milliGRAM(s) Enteral Tube every 6 hours PRN Temp greater or equal to 38C (100.4F), Mild Pain (1 - 3)  benzocaine 20% Spray 1 Spray(s) Topical four times a day PRN Cough  diphenhydrAMINE Elixir 25 milliGRAM(s) Oral every 6 hours PRN Rash and/or Itching  oxyCODONE    Solution 2.5 milliGRAM(s) Oral every 6 hours PRN Moderate Pain (4 - 6)  sodium chloride 0.65% Nasal 1 Spray(s) Both Nostrils every 6 hours PRN Nasal Congestion      Vital Signs Last 24 Hrs  T(C): 36.5 (28 Nov 2023 14:41), Max: 37.3 (27 Nov 2023 18:54)  T(F): 97.7 (28 Nov 2023 14:41), Max: 99.1 (27 Nov 2023 18:54)  HR: 91 (28 Nov 2023 17:53) (73 - 92)  BP: 131/65 (28 Nov 2023 14:41) (130/65 - 143/67)  BP(mean): --  RR: 12 (28 Nov 2023 04:42) (12 - 23)  SpO2: 100% (28 Nov 2023 17:53) (100% - 100%)    Parameters below as of 28 Nov 2023 17:53  Patient On (Oxygen Delivery Method): tracheostomy collar, 40%            I&O's Summary    17 Nov 2023 07:01  -  18 Nov 2023 07:00  --------------------------------------------------------  IN: 1300 mL / OUT: 950 mL / NET: 350 mL    18 Nov 2023 07:01  -  18 Nov 2023 17:32  --------------------------------------------------------  IN: 0 mL / OUT: 400 mL / NET: -400 mL        PHYSICAL EXAM:  GENERAL: NAD, well-developed  HEAD:  Atraumatic, Normocephalic  EYES: EOMI, PERRLA, conjunctiva and sclera clear  NECK: Supple, No JVD. On mech vent with a tracheostomy  CHEST/LUNG: Clear to auscultation bilaterally; No wheeze  HEART: Regular rate and rhythm; No murmurs, rubs, or gallops  ABDOMEN: Soft, Nontender, Nondistended; Bowel sounds present  EXTREMITIES:  2+ Peripheral Pulses, No clubbing, cyanosis, or edema  PSYCH: AAOx3  NEUROLOGY: non-focal. Functionally quadriplegic  SKIN: Sacral decubitus dressed    LABS:                                 9.5    8.05  )-----------( 130      ( 28 Nov 2023 06:36 )             32.2                7.5    7.48  )-----------( 134      ( 26 Nov 2023 07:27 )             25.6                                    8.6    8.89  )-----------( 126      ( 21 Nov 2023 07:05 )             29.1                8.9    9.19  )-----------( 124      ( 18 Nov 2023 09:15 )             30.4     11-18    137  |  99  |  29<H>  ----------------------------<  224<H>  4.1   |  28  |  <0.30<L>    Ca    10.0      18 Nov 2023 09:15  Mg     1.8     11-18    TPro  7.4  /  Alb  3.3  /  TBili  0.3  /  DBili  x   /  AST  19  /  ALT  18  /  AlkPhos  48  11-18          Urinalysis Basic - ( 18 Nov 2023 09:15 )    Color: x / Appearance: x / SG: x / pH: x  Gluc: 224 mg/dL / Ketone: x  / Bili: x / Urobili: x   Blood: x / Protein: x / Nitrite: x   Leuk Esterase: x / RBC: x / WBC x   Sq Epi: x / Non Sq Epi: x / Bacteria: x        RADIOLOGY & ADDITIONAL TESTS:    Imaging Personally Reviewed:    Consultant(s) Notes Reviewed:      Care Discussed with Consultants/Other Providers:

## 2023-11-28 NOTE — PROGRESS NOTE ADULT - PROBLEM SELECTOR PLAN 5
LDL goal <70 due to DM  Pt LDL NA  Order fasting lipid if not recently done  Consistent moderate intensity Statin if not contraindicated   Manage per primary team   F/u levels as out pt

## 2023-11-28 NOTE — PROGRESS NOTE ADULT - ASSESSMENT
73 F PMH T2DM (A1c 7.5), HTN, HLD, anemia, hypothyroidism, RA, fibromyalgia, remote cerebral aneurysm repair, respiratory failure s/p Trach, s/p PEG, bedbound (trach change 8/18, PEG change 8/14), sacral wound, brought in for bleeding from trach and PEG site  Bleeding from trach  No fever, no leukocytosis  RVP neg  CT with PEG, sacral ulcer with osseous remodeling, patchy consolidative opacities  CXR with pneumonia  Sputum culture MSSA, pseudomonas  Treat for pneumonia  MR with evidence acute OM  Note wound care eval generally reassuring  Prolonged hospitalization, now with  Pseudomonas in sputum  Had low grade fever, however resp status is improved compared to prior; no secretions, no increased O2 requirements  WBC unchanged  For now would view the Pseudomonas as a trach colonizer and monitor for further fevers  Low CFU Enterococcus likely colonizer, would not treat as long as clinical status remains stable  Overall, PNA/tracheitis, bleeding from trach, sacral wound  - Cipro 500mg q 12 and Keflex 500mg q 6 to complete a total 6 weeks from start of Cefepime. Resolution of wound more dependent on wound care/frequent turning (as opposed to antibiotic course alone). Would not commit to prolonged IV course given anticipated modest benefit.  - Wound care to sacral wound  - Monitor for further signs infection  - Would not add on coverage for Pseudomonas currently as resp status is stable/improved with no specific signs pneumonia (Trach likely colonized with Pseudomonas)--alternatively if fevers intensify, or active resp symptoms, would have to add on coverage for this bacteria    Discussed with RCU team    Signing off. Please call with further questions or change in status.    Thomas Ahn MD  Contact on TEAMS messaging from 9am - 5pm  From 5pm-9am, on weekends, or if no response call [phone]

## 2023-11-28 NOTE — PROGRESS NOTE ADULT - SUBJECTIVE AND OBJECTIVE BOX
Patient is a 73y old  Female who presents with a chief complaint of Bleeding from tracheostomy and PEG tube (2023 08:15)      Interval Events:    REVIEW OF SYSTEMS:  [ ] Positive  [ ] All other systems negative  [ ] Unable to assess ROS because ________    Vital Signs Last 24 Hrs  T(C): 36.6 (23 @ 04:42), Max: 37.3 (23 @ 18:54)  T(F): 97.9 (23 @ 04:42), Max: 99.1 (23 @ 18:54)  HR: 73 (23 @ 05:58) (73 - 101)  BP: 140/64 (23 @ 04:42) (130/65 - 167/76)  RR: 12 (23 @ 04:42) (12 - 23)  SpO2: 100% (23 @ 05:58) (100% - 100%)    PHYSICAL EXAM:  HEENT:   [ ]Tracheostomy:  [ ]Pupils equal  [ ]No oral lesions  [ ]Abnormal    SKIN  [ ]No Rash  [ ] Abnormal  [ ] pressure    CARDIAC  [ ]Regular  [ ]Abnormal    PULMONARY  [ ]Bilateral Clear Breath Sounds  [ ]Normal Excursion  [ ]Abnormal    GI  [ ]PEG      [ ] +BS		              [ ]Soft, nondistended, nontender	  [ ]Abnormal    MUSCULOSKELETAL                                   [ ]Bedbound                 [ ]Abnormal    [ ]Ambulatory/OOB to chair                           EXTREMITIES                                         [ ]Normal  [ ]Edema                           NEUROLOGIC  [ ] Normal, non focal  [ ] Focal findings:    PSYCHIATRIC  [ ]Alert and appropriate  [ ] Sedated	 [ ]Agitated    :  Reed: [ ] Yes, if yes: Date of Placement:                   [  ] No    LINES: Central Lines [ ] Yes, if yes: Date of Placement                                     [  ] No    HOSPITAL MEDICATIONS:  MEDICATIONS  (STANDING):  albuterol/ipratropium for Nebulization 3 milliLiter(s) Nebulizer every 6 hours  amLODIPine   Tablet 10 milliGRAM(s) Oral daily  artificial  tears Solution 2 Drop(s) Both EYES four times a day  ascorbic acid 500 milliGRAM(s) Oral daily  calcium carbonate    500 mG (Tums) Chewable 1 Tablet(s) Chew every 12 hours  cephalexin 500 milliGRAM(s) Oral every 6 hours  chlorhexidine 0.12% Liquid 15 milliLiter(s) Oral Mucosa every 12 hours  chlorhexidine 4% Liquid 1 Application(s) Topical <User Schedule>  ciprofloxacin   IVPB 400 milliGRAM(s) IV Intermittent every 12 hours  dextrose 50% Injectable 25 Gram(s) IV Push once  dextrose 50% Injectable 25 Gram(s) IV Push once  dextrose 50% Injectable 50 milliLiter(s) IV Push once  dextrose 50% Injectable 12.5 Gram(s) IV Push once  dextrose Oral Gel 15 Gram(s) Oral once  escitalopram 10 milliGRAM(s) Oral <User Schedule>  gabapentin Solution 100 milliGRAM(s) Enteral Tube at bedtime  glucagon  Injectable 1 milliGRAM(s) IntraMuscular once  hemorrhoidal Ointment      hemorrhoidal Ointment 1 Application(s) Rectal daily  insulin glargine Injectable (LANTUS) 30 Unit(s) SubCutaneous every morning  insulin lispro (ADMELOG) corrective regimen sliding scale   SubCutaneous every 6 hours  insulin regular  human recombinant 17 Unit(s) SubCutaneous <User Schedule>  levothyroxine 125 MICROGram(s) Oral <User Schedule>  lidocaine   4% Patch 1 Patch Transdermal daily  melatonin 3 milliGRAM(s) Oral at bedtime  multivitamin/minerals/iron Oral Solution (CENTRUM) 15 milliLiter(s) Oral daily  nystatin Powder 1 Application(s) Topical every 8 hours  oxybutynin 5 milliGRAM(s) Oral daily  pantoprazole   Suspension 40 milliGRAM(s) Enteral Tube <User Schedule>  petrolatum Ophthalmic Ointment 1 Application(s) Both EYES four times a day  predniSONE   Tablet 5 milliGRAM(s) Oral daily  QUEtiapine 12.5 milliGRAM(s) Oral <User Schedule>  simethicone 80 milliGRAM(s) Chew four times a day  sodium chloride 3%  Inhalation 4 milliLiter(s) Inhalation every 12 hours  triamcinolone 0.1% Ointment 1 Application(s) Topical two times a day  zinc oxide 40% Paste 1 Application(s) Topical daily    MEDICATIONS  (PRN):  acetaminophen   Oral Liquid .. 1000 milliGRAM(s) Enteral Tube every 6 hours PRN Temp greater or equal to 38C (100.4F), Mild Pain (1 - 3)  benzocaine 20% Spray 1 Spray(s) Topical four times a day PRN Cough  diphenhydrAMINE Elixir 25 milliGRAM(s) Oral every 6 hours PRN Rash and/or Itching  guaifenesin/dextromethorphan Oral Liquid 10 milliLiter(s) Oral every 6 hours PRN Cough  sodium chloride 0.65% Nasal 1 Spray(s) Both Nostrils every 6 hours PRN Nasal Congestion      LABS:                        9.5    8.05  )-----------( 130      ( 2023 06:36 )             32.2         135  |  97  |  27<H>  ----------------------------<  122<H>  3.9   |  28  |  <0.30<L>    Ca    9.7      2023 07:24        Urinalysis Basic - ( 2023 15:56 )    Color: Yellow / Appearance: Clear / S.015 / pH: x  Gluc: x / Ketone: Trace mg/dL  / Bili: Negative / Urobili: 0.2 mg/dL   Blood: x / Protein: Negative mg/dL / Nitrite: Negative   Leuk Esterase: Trace / RBC: 1 /HPF / WBC 2 /HPF   Sq Epi: x / Non Sq Epi: 1 /HPF / Bacteria: Negative /HPF          CAPILLARY BLOOD GLUCOSE    MICROBIOLOGY:     RADIOLOGY:  [ ] Reviewed and interpreted by me    Mode: AC/ CMV (Assist Control/ Continuous Mandatory Ventilation)  RR (machine): 12  TV (machine): 300  FiO2: 30  PEEP: 5  ITime: 1  MAP: 8  PIP: 25   Patient is a 73y old  Female who presents with a chief complaint of Bleeding from tracheostomy and PEG tube (2023 08:15)      Interval Events:    REVIEW OF SYSTEMS:  [ ] Positive  [X] All other systems negative  [ ] Unable to assess ROS because ________    Vital Signs Last 24 Hrs  T(C): 36.6 (23 @ 04:42), Max: 37.3 (23 @ 18:54)  T(F): 97.9 (23 @ 04:42), Max: 99.1 (23 @ 18:54)  HR: 73 (23 @ 05:58) (73 - 101)  BP: 140/64 (23 @ 04:42) (130/65 - 167/76)  RR: 12 (23 @ 04:42) (12 - 23)  SpO2: 100% (23 @ 05:58) (100% - 100%)    PHYSICAL EXAM:  HEENT:   [X]Tracheostomy:  [X]Pupils equal  [ ]No oral lesions  [ ]Abnormal    SKIN  [X]No Rash  [ ] Abnormal  [ ] pressure    CARDIAC  [X]Regular  [ ]Abnormal    PULMONARY  [X]Bilateral Clear Breath Sounds  [ ]Normal Excursion  [ ]Abnormal    GI  [X]PEG      [X] +BS		              [X]Soft, nondistended, nontender	  [ ]Abnormal    MUSCULOSKELETAL                                   [X]Bedbound                 [ ]Abnormal    [ ]Ambulatory/OOB to chair                           EXTREMITIES                                         [X]Normal  [ ]Edema                           NEUROLOGIC  [X] Normal, non focal - opens eyes, following commands x4 extremities  [ ] Focal findings:    PSYCHIATRIC  [X]Alert and appropriate  [ ] Sedated	 [ ]Agitated    :  Mcbride: [ ] Yes, if yes: Date of Placement:                   [X] No    LINES: Central Lines [ ] Yes, if yes: Date of Placement                                     [X] No    HOSPITAL MEDICATIONS:  MEDICATIONS  (STANDING):  albuterol/ipratropium for Nebulization 3 milliLiter(s) Nebulizer every 6 hours  amLODIPine   Tablet 10 milliGRAM(s) Oral daily  artificial  tears Solution 2 Drop(s) Both EYES four times a day  ascorbic acid 500 milliGRAM(s) Oral daily  calcium carbonate    500 mG (Tums) Chewable 1 Tablet(s) Chew every 12 hours  cephalexin 500 milliGRAM(s) Oral every 6 hours  chlorhexidine 0.12% Liquid 15 milliLiter(s) Oral Mucosa every 12 hours  chlorhexidine 4% Liquid 1 Application(s) Topical <User Schedule>  ciprofloxacin   IVPB 400 milliGRAM(s) IV Intermittent every 12 hours  dextrose 50% Injectable 25 Gram(s) IV Push once  dextrose 50% Injectable 25 Gram(s) IV Push once  dextrose 50% Injectable 50 milliLiter(s) IV Push once  dextrose 50% Injectable 12.5 Gram(s) IV Push once  dextrose Oral Gel 15 Gram(s) Oral once  escitalopram 10 milliGRAM(s) Oral <User Schedule>  gabapentin Solution 100 milliGRAM(s) Enteral Tube at bedtime  glucagon  Injectable 1 milliGRAM(s) IntraMuscular once  hemorrhoidal Ointment      hemorrhoidal Ointment 1 Application(s) Rectal daily  insulin glargine Injectable (LANTUS) 30 Unit(s) SubCutaneous every morning  insulin lispro (ADMELOG) corrective regimen sliding scale   SubCutaneous every 6 hours  insulin regular  human recombinant 17 Unit(s) SubCutaneous <User Schedule>  levothyroxine 125 MICROGram(s) Oral <User Schedule>  lidocaine   4% Patch 1 Patch Transdermal daily  melatonin 3 milliGRAM(s) Oral at bedtime  multivitamin/minerals/iron Oral Solution (CENTRUM) 15 milliLiter(s) Oral daily  nystatin Powder 1 Application(s) Topical every 8 hours  oxybutynin 5 milliGRAM(s) Oral daily  pantoprazole   Suspension 40 milliGRAM(s) Enteral Tube <User Schedule>  petrolatum Ophthalmic Ointment 1 Application(s) Both EYES four times a day  predniSONE   Tablet 5 milliGRAM(s) Oral daily  QUEtiapine 12.5 milliGRAM(s) Oral <User Schedule>  simethicone 80 milliGRAM(s) Chew four times a day  sodium chloride 3%  Inhalation 4 milliLiter(s) Inhalation every 12 hours  triamcinolone 0.1% Ointment 1 Application(s) Topical two times a day  zinc oxide 40% Paste 1 Application(s) Topical daily    MEDICATIONS  (PRN):  acetaminophen   Oral Liquid .. 1000 milliGRAM(s) Enteral Tube every 6 hours PRN Temp greater or equal to 38C (100.4F), Mild Pain (1 - 3)  benzocaine 20% Spray 1 Spray(s) Topical four times a day PRN Cough  diphenhydrAMINE Elixir 25 milliGRAM(s) Oral every 6 hours PRN Rash and/or Itching  guaifenesin/dextromethorphan Oral Liquid 10 milliLiter(s) Oral every 6 hours PRN Cough  sodium chloride 0.65% Nasal 1 Spray(s) Both Nostrils every 6 hours PRN Nasal Congestion      LABS:                        9.5    8.05  )-----------( 130      ( 2023 06:36 )             32.2         135  |  97  |  27<H>  ----------------------------<  122<H>  3.9   |  28  |  <0.30<L>    Ca    9.7      2023 07:24        Urinalysis Basic - ( 2023 15:56 )    Color: Yellow / Appearance: Clear / S.015 / pH: x  Gluc: x / Ketone: Trace mg/dL  / Bili: Negative / Urobili: 0.2 mg/dL   Blood: x / Protein: Negative mg/dL / Nitrite: Negative   Leuk Esterase: Trace / RBC: 1 /HPF / WBC 2 /HPF   Sq Epi: x / Non Sq Epi: 1 /HPF / Bacteria: Negative /HPF          CAPILLARY BLOOD GLUCOSE    MICROBIOLOGY:     RADIOLOGY:  [ ] Reviewed and interpreted by me    Mode: AC/ CMV (Assist Control/ Continuous Mandatory Ventilation)  RR (machine): 12  TV (machine): 300  FiO2: 30  PEEP: 5  ITime: 1  MAP: 8  PIP: 25   Patient is a 73y old  Female who presents with a chief complaint of Bleeding from tracheostomy and PEG tube (2023 08:15)      Interval Events:    REVIEW OF SYSTEMS:  [ ] Positive  [X] All other systems negative  [ ] Unable to assess ROS because ________    Vital Signs Last 24 Hrs  T(C): 36.6 (23 @ 04:42), Max: 37.3 (23 @ 18:54)  T(F): 97.9 (23 @ 04:42), Max: 99.1 (23 @ 18:54)  HR: 73 (23 @ 05:58) (73 - 101)  BP: 140/64 (23 @ 04:42) (130/65 - 167/76)  RR: 12 (23 @ 04:42) (12 - 23)  SpO2: 100% (23 @ 05:58) (100% - 100%)    PHYSICAL EXAM:  HEENT:   [X]Tracheostomy: #8 distal XLT [place]  [X]Pupils equal  [ ]No oral lesions  [ ]Abnormal    SKIN  [X]No Rash  [ ] Abnormal  [ ] pressure    CARDIAC  [X]Regular  [ ]Abnormal    PULMONARY  [X]Bilateral Clear Breath Sounds  [ ]Normal Excursion  [ ]Abnormal    GI  [X]PEG      [X] +BS		              [X]Soft, nondistended, nontender	  [ ]Abnormal    MUSCULOSKELETAL                                   [X]Bedbound                 [ ]Abnormal    [ ]Ambulatory/OOB to chair                           EXTREMITIES                                         [X]Normal  [ ]Edema                           NEUROLOGIC  [X] Normal, non focal - opens eyes, following commands x4 extremities  [ ] Focal findings:    PSYCHIATRIC  [X]Alert and appropriate  [ ] Sedated	 [ ]Agitated    :  Mcbride: [ ] Yes, if yes: Date of Placement:                   [X] No    LINES: Central Lines [ ] Yes, if yes: Date of Placement                                     [X] No    HOSPITAL MEDICATIONS:  MEDICATIONS  (STANDING):  albuterol/ipratropium for Nebulization 3 milliLiter(s) Nebulizer every 6 hours  amLODIPine   Tablet 10 milliGRAM(s) Oral daily  artificial  tears Solution 2 Drop(s) Both EYES four times a day  ascorbic acid 500 milliGRAM(s) Oral daily  calcium carbonate    500 mG (Tums) Chewable 1 Tablet(s) Chew every 12 hours  cephalexin 500 milliGRAM(s) Oral every 6 hours  chlorhexidine 0.12% Liquid 15 milliLiter(s) Oral Mucosa every 12 hours  chlorhexidine 4% Liquid 1 Application(s) Topical <User Schedule>  ciprofloxacin   IVPB 400 milliGRAM(s) IV Intermittent every 12 hours  dextrose 50% Injectable 25 Gram(s) IV Push once  dextrose 50% Injectable 25 Gram(s) IV Push once  dextrose 50% Injectable 50 milliLiter(s) IV Push once  dextrose 50% Injectable 12.5 Gram(s) IV Push once  dextrose Oral Gel 15 Gram(s) Oral once  escitalopram 10 milliGRAM(s) Oral <User Schedule>  gabapentin Solution 100 milliGRAM(s) Enteral Tube at bedtime  glucagon  Injectable 1 milliGRAM(s) IntraMuscular once  hemorrhoidal Ointment      hemorrhoidal Ointment 1 Application(s) Rectal daily  insulin glargine Injectable (LANTUS) 30 Unit(s) SubCutaneous every morning  insulin lispro (ADMELOG) corrective regimen sliding scale   SubCutaneous every 6 hours  insulin regular  human recombinant 17 Unit(s) SubCutaneous <User Schedule>  levothyroxine 125 MICROGram(s) Oral <User Schedule>  lidocaine   4% Patch 1 Patch Transdermal daily  melatonin 3 milliGRAM(s) Oral at bedtime  multivitamin/minerals/iron Oral Solution (CENTRUM) 15 milliLiter(s) Oral daily  nystatin Powder 1 Application(s) Topical every 8 hours  oxybutynin 5 milliGRAM(s) Oral daily  pantoprazole   Suspension 40 milliGRAM(s) Enteral Tube <User Schedule>  petrolatum Ophthalmic Ointment 1 Application(s) Both EYES four times a day  predniSONE   Tablet 5 milliGRAM(s) Oral daily  QUEtiapine 12.5 milliGRAM(s) Oral <User Schedule>  simethicone 80 milliGRAM(s) Chew four times a day  sodium chloride 3%  Inhalation 4 milliLiter(s) Inhalation every 12 hours  triamcinolone 0.1% Ointment 1 Application(s) Topical two times a day  zinc oxide 40% Paste 1 Application(s) Topical daily    MEDICATIONS  (PRN):  acetaminophen   Oral Liquid .. 1000 milliGRAM(s) Enteral Tube every 6 hours PRN Temp greater or equal to 38C (100.4F), Mild Pain (1 - 3)  benzocaine 20% Spray 1 Spray(s) Topical four times a day PRN Cough  diphenhydrAMINE Elixir 25 milliGRAM(s) Oral every 6 hours PRN Rash and/or Itching  guaifenesin/dextromethorphan Oral Liquid 10 milliLiter(s) Oral every 6 hours PRN Cough  sodium chloride 0.65% Nasal 1 Spray(s) Both Nostrils every 6 hours PRN Nasal Congestion      LABS:                        9.5    8.05  )-----------( 130      ( 2023 06:36 )             32.2         135  |  97  |  27<H>  ----------------------------<  122<H>  3.9   |  28  |  <0.30<L>    Ca    9.7      2023 07:24        Urinalysis Basic - ( 2023 15:56 )    Color: Yellow / Appearance: Clear / S.015 / pH: x  Gluc: x / Ketone: Trace mg/dL  / Bili: Negative / Urobili: 0.2 mg/dL   Blood: x / Protein: Negative mg/dL / Nitrite: Negative   Leuk Esterase: Trace / RBC: 1 /HPF / WBC 2 /HPF   Sq Epi: x / Non Sq Epi: 1 /HPF / Bacteria: Negative /HPF          CAPILLARY BLOOD GLUCOSE    MICROBIOLOGY:     RADIOLOGY:  [ ] Reviewed and interpreted by me    Mode: AC/ CMV (Assist Control/ Continuous Mandatory Ventilation)  RR (machine): 12  TV (machine): 300  FiO2: 30  PEEP: 5  ITime: 1  MAP: 8  PIP: 25

## 2023-11-28 NOTE — PROGRESS NOTE ADULT - NS ATTEND AMEND GEN_ALL_CORE FT
73F with a h/o DM, HTN, HLD, anemia, hypothyroidism, RA, fibromyalgia, remote cerebral aneurysm with repair, hypercapnic respiratory failure s/p tracheostomy/PEG, bedbound, sacral pressure ulcer. Admitted w/ bleeding from tracheostomy and PEG w/ associated abdominal pain, recent hx of auditory/visual hallucinations. Since admission, no further bleeding noted from either trach or PEG. Imaging showed mild thickening along PEG tube tract and sacral ulcer extending to coccyx suggestive of OM.        # Osteomyelitis  # Chronic hypoxemic RF  # oropharyngeal dysphagia  # acute on chronic pain  # Trach/Peg bleeding  # Tracheitis with Pseudomonas and serratia  # Hx of hallucination, anxiety - particularly in setting of prior antibiotics    #Neuro - awake, alert, and interactive. moving all extremities  - Patient presently calm and following commands appropriately. Continue Seroquel  - Behavioral Health Follow-up as needed  #Cardiovascular - hemodynamically stable  #Pulmonary - acute on chronic hypoxemic respiratory failure - on home vent after extensive hospitalizations over the prior year since  being trached last December  - ENT follow-up as needed - previously changed to 8XLT trach - daughter was upset about previous trach changes but presently patient ventilating in stable fashion on current settings  - Maintain O2 sat > 90%  - Daughter requesting home CO2 monitoring device as unable have ABGs done at home as patient has a history of CO2 increase off ventilator  - Continue hypersal for now  #Gastro - oropharyngeal dysphagia with PEG tube in place  - Reported to have increased BM today/last few days per daughter with some abdominal distension. Will check AXR and follow-up GI  - History of c. diff previously treated per daughter - if diarrhea persists off stool softeners will need to repeat testing for this  #Infectious Disease - sacral osteo based on MRI - will need wound care follow-up to ensure no debridement required (per daughter had debridement in past at another hospitalization)  - Continue Cipro and Keflex ass per ID - with plan for 6 week course in total  - Per daughter, patient has had prior multiple infections during hospitalizations including prior pseudomonas, MRSA, E faecalis, and Klebsiella  - Sputum now with MDR Pseudomonas - ID follow-up however would consider holding off targeting this as respiratory status presently stable and no increase in secretions  - Follow-up repeat cultures which are in lab  - Daughter reports a recent dental abscess and being told by outpatient dentist that patient needed teeth extracted - Dental evaluation  #Hem/Onc - prior cytopenias in setting of antibiotics per patient's daughter -   - Required PRBC transfusion over the weekend for Hb < 8 but no clear signs of bleeding  - Hem/Onc follow-up discussed with Dr. Sullivan  - Daughter told by a prior Hematologist that her Hb must remain above 8 - though unclear whether this was in relation to any particular finding or just clinical gestalt  #Pain - continue current pain control - in setting of fibromyalgia and pain from wound  #Derm - previously seen by derm for skin lesions - mid back with irritated seborrheic keratosis - outpatient follow-up  #Endo - DM2 - continue insulin and adjust as needed for goal FS < 180  - Daughter frustrated with increase in insulin requirement without clear cause as well as timing of medications/IV drips and fingersticks  - Hyperglycemia is one of the reasons we have opted against increasing Prednisone at this time - daughter states she was told by a prior   Rheumatologist that it should be increased from 5 to 7.5mg or 10mg when sick - but no clear indication at this time  - Continue Synthroid and repeat TSH  #DVT proph - SCD  - Prior prophylactic AC stopped after concern for prior bleeding    Prognosis guarded    I had an extended conversation with patient's daughter today both over the phone this AM and in person this afternoon to discuss current care and plan, obtain prior history, and discuss her concerns and frustrations. I have spent > 60 minutes in these conversations. As part of conversation patient's daughter continues to reiterate her hope for mother's recovery and current code status with all current treatments. All questions answered. I also spoke with Dr. Daniel who has known patient for many years and he was part of our AM discussions with patient's daughter as well.  - ACP 18 minutes as part of these extended discussions.    Patient's daughter also indicates her stance that she will not agree with any discharge to a SNF when patient becomes appropriate for discharge. At this time she has many other clinical concerns but she has requested further discussions with the Case Management team. She tells me she has found an agency ([facility]) which will be able to provide 24 hour nursing if approved by insurance for [facility] care.   - The patient does require 2 person assist and would benefit f rom continuous CO2 monitoring at home.    However, she does not have a certified home health agency yet but is frustrated because she does not believe her mother requires any higher level of care than on prior discharges and she believes this is being denied/rejected from a financial/polictical motive. She tells me that her mother is entitled to the [facility] services including PT/OT/speech and other issues including vent management/etc would be managed by other providers. The patient continues to require wound care  - She has been told by some of the [facility] that she has reached out to that the hospital has to provide the applications for this. On my separate discussion with CM team they tell me these applications and requests have been made but reportedly denied from multiple [facility] agencies. Will need to confirm with CM team what options will be available. 73F with a h/o DM, HTN, HLD, anemia, hypothyroidism, RA, fibromyalgia, remote cerebral aneurysm with repair, hypercapnic respiratory failure s/p tracheostomy/PEG, bedbound, sacral pressure ulcer. Admitted w/ bleeding from tracheostomy and PEG w/ associated abdominal pain, recent hx of auditory/visual hallucinations. Since admission, no further bleeding noted from either trach or PEG. Imaging showed mild thickening along PEG tube tract and sacral ulcer extending to coccyx suggestive of OM.        # Osteomyelitis  # Chronic hypoxemic RF  # oropharyngeal dysphagia  # acute on chronic pain  # Trach/Peg bleeding  # Tracheitis with Pseudomonas and serratia  # Hx of hallucination, anxiety - particularly in setting of prior antibiotics    #Neuro - awake, alert, and interactive. moving all extremities  - Patient presently calm and following commands appropriately. Continue Seroquel  - Behavioral Health Follow-up as needed  #Cardiovascular - hemodynamically stable  #Pulmonary - acute on chronic hypoxemic respiratory failure - on home vent after extensive hospitalizations over the prior year since being trached last December - [facility] as able  - ENT follow-up as needed - previously changed to 8XLT trach - daughter was upset about previous trach changes but presently patient ventilating in stable fashion on current settings  - Maintain O2 sat > 90%  - Daughter requesting home CO2 monitoring device as unable have ABGs done at home as patient has a history of CO2 increase off ventilator  - Continue hypersal for now  #Gastro - oropharyngeal dysphagia with PEG tube in place  - No further diarrhea/loose BM today since stool softeners stopped  - GI evaluation and AXR noted - no signs of obstruction  - History of c. diff previously treated per daughter - if diarrhea recurs off stool softeners will need to repeat testing for this  #Infectious Disease - sacral osteo based on MRI - will need wound care follow-up to ensure no debridement required (per daughter had debridement in past at another hospitalization)  - Continue Cipro and Keflex ass per ID - with plan for 6 week course in total. Will continue Cipro in D5 despite hyperglycemia given improved absorption compared to PO per d/w ID and pharmacy  - Per daughter, patient has had prior multiple infections during hospitalizations including prior pseudomonas, MRSA, E faecalis, and Klebsiella  - Sputum now with MDR Pseudomonas - ID follow-up however would consider holding off targeting this as respiratory status presently stable and no increase in secretions  - Follow-up repeat cultures which are in lab - blood cultures thus far negative.  - Daughter reports a recent dental abscess and being told by outpatient dentist that patient needed teeth extracted - Dental evaluation noted with no plans for intervention as inpatient.  #Hem/Onc - prior cytopenias in setting of antibiotics per patient's daughter -   - Required PRBC transfusion over the weekend for Hb < 8 but no clear signs of bleeding  - Hem/Onc follow-up noted - suspect an anemia of chronic disease  - Daughter told by a prior Hematologist that her Hb must remain above 8 - though unclear whether this was in relation to any particular finding or just clinical gestalt  #Pain - continue current pain control - in setting of fibromyalgia and pain from wound  #Derm - previously seen by derm for skin lesions - mid back with irritated seborrheic keratosis - outpatient follow-up  #Endo - DM2 - continue insulin and adjust as needed for goal FS < 180  - Endocrine follow-up for assistance with hyperglycemia management  - Continue Synthroid and repeat TSH  #DVT proph - SCD  - Prior prophylactic AC stopped after concern for prior bleeding    Prognosis guarded    I spoke with patient's daughter over the phone this AM during rounds for about 20 minutes. All questions answered. Will continue to work with CM team about discharge planning though at this time there are no accepting [facility] which seems to be a big barrier. IV antibiotics are going to be needed til about 12/10 per ID

## 2023-11-28 NOTE — PROGRESS NOTE ADULT - SUBJECTIVE AND OBJECTIVE BOX
DIABETES FOLLOW UP NOTE: Saw pt earlier today    Chief Complaint: Endocrine consult requested for management of T2DM    INTERVAL HX: Pt stable, lethargic so doesn't answer to any questions. Per staff, pt tolerating TFs of [employer] glucose support 1.2 at 56cc/hr from 6am to 3am. BG levels erratic between 80s to high 200s in the last 24 hours. Noted team holding regular insulin on and off in the last 24 hours as well. FBG in 80s this am before starting TFs and getting Prednisone 5mg > regular insulin held with f/u  at 12 noon while on TFs. No hypoglycemia per POC or BMPs.        Review of Systems:  General: As above  Unable  Allergies    penicillin (Unknown)  heparin (Unknown)  Tagamet (Unknown)  pineapple (Unknown)  walnut (Unknown)  Pecan, Filbert, Hazelnut (Unknown)  Lyrica (Unknown)    Intolerances      MEDICATIONS:  cephalexin 500 milliGRAM(s) Oral every 6 hours  insulin glargine Injectable (LANTUS) 30 Unit(s) SubCutaneous every morning  insulin lispro (ADMELOG) corrective regimen sliding scale   SubCutaneous every 6 hours  insulin regular  human recombinant 17 Unit(s) SubCutaneous <User Schedule>  levothyroxine 125 MICROGram(s) Oral <User Schedule>  predniSONE   Tablet 5 milliGRAM(s) Oral daily      PHYSICAL EXAM:  VITALS: T(C): 36.5 (11-28-23 @ 14:41)  T(F): 97.7 (11-28-23 @ 14:41), Max: 99.1 (11-27-23 @ 18:54)  HR: 76 (11-28-23 @ 14:41) (73 - 92)  BP: 131/65 (11-28-23 @ 14:41) (130/65 - 143/67)  RR:  (12 - 23)  SpO2:  (100% - 100%)  Wt(kg): --  GENERAL: Female laying on bed in NAD. HHA at bedside  HEENT: Trach in place D&I  Abdomen: Soft, nontender, non distended. PEG in place with TFs going at 56cc/hr at time of visit  Extremities: Warm, no edema in all 4 exts  NEURO: Lethargic, doesn't answer to any questions    LABS:  POCT Blood Glucose.: 292 mg/dL (11-28-23 @ 13:04)  POCT Blood Glucose.: 170 mg/dL (11-28-23 @ 08:58)  POCT Blood Glucose.: 89 mg/dL (11-28-23 @ 05:32)  POCT Blood Glucose.: 98 mg/dL (11-27-23 @ 23:45)  POCT Blood Glucose.: 89 mg/dL (11-27-23 @ 19:38)  POCT Blood Glucose.: 108 mg/dL (11-27-23 @ 18:21)  POCT Blood Glucose.: 155 mg/dL (11-27-23 @ 11:49)  POCT Blood Glucose.: 148 mg/dL (11-27-23 @ 08:19)  POCT Blood Glucose.: 158 mg/dL (11-27-23 @ 05:25)  POCT Blood Glucose.: 100 mg/dL (11-27-23 @ 01:55)  POCT Blood Glucose.: 86 mg/dL (11-27-23 @ 00:05)  POCT Blood Glucose.: 90 mg/dL (11-26-23 @ 23:29)  POCT Blood Glucose.: 224 mg/dL (11-26-23 @ 18:05)  POCT Blood Glucose.: 181 mg/dL (11-26-23 @ 12:17)  POCT Blood Glucose.: 222 mg/dL (11-26-23 @ 08:20)  POCT Blood Glucose.: 139 mg/dL (11-26-23 @ 06:00)  POCT Blood Glucose.: 166 mg/dL (11-26-23 @ 00:15)  POCT Blood Glucose.: 165 mg/dL (11-25-23 @ 17:38)                            9.5    8.05  )-----------( 130      ( 28 Nov 2023 06:36 )             32.2       11-28    136  |  99  |  19  ----------------------------<  311<H>  4.7   |  28  |  <0.30<L>    eGFR: 113    Ca    9.1      11-28  Mg     1.8     11-28  Phos  4.3     11-28    Thyroid Function Tests:  11-28 @ 06:37 TSH 2.80 FreeT4 1.2 T3 -- Anti TPO -- Anti Thyroglobulin Ab -- TSI --  11-10 @ 06:26 TSH 7.90 FreeT4 0.7 T3 -- Anti TPO -- Anti Thyroglobulin Ab -- TSI --      A1C with Estimated Average Glucose Result: 7.5 % (10-28-23 @ 07:18)      Estimated Average Glucose: 169 mg/dL (10-28-23 @ 07:18)    11-25 Chol -- Direct LDL -- LDL calculated -- HDL -- Trig 192<H>

## 2023-11-28 NOTE — PROGRESS NOTE ADULT - PROBLEM SELECTOR PLAN 3
Due to chronic steroid use pt is at risk of tertiary AI  -c/w prednisone 5mg daily  -BP stable would not increase dose of prednisone at this time

## 2023-11-28 NOTE — PROGRESS NOTE ADULT - PROBLEM SELECTOR PLAN 1
Found w/ Bilateral PNA vs Atelectasis, and Possible OM Sacrum   S/p Fevers at home, Afebrile since admission __ remains afebrile, no leucocytosis,   - S/p Chest CT (10/28):  c/w Bilateral PNA vs Atelectasis   - s/p Empirically on Vanco, Aztreonam   - Blood cx: 11/12 -- neg   - urine culture: negative   - Sputum cx + Pseudomonas aeruginosa, S. aureus  - Sputum Cx 11/6: + PSA and Serratia, Cefepime 11/8 -->11/15  - HDS, stable vitals signs, afebrile  -- see tx of sacral OM below Found w/ Bilateral PNA vs Atelectasis, and Possible OM Sacrum   S/p Fevers at home, Afebrile since admission __ remains afebrile, no leucocytosis,   - S/p Chest CT (10/28):  c/w Bilateral PNA vs Atelectasis   - s/p Empirically on Vanco, Aztreonam   - Blood cx: 11/12 -- neg   - urine culture: negative   - Sputum cx + Pseudomonas aeruginosa, S. aureus  - Sputum Cx 11/6: + PSA and Serratia, Cefepime 11/8 -->11/15  - 11/22 & 11/26 blood cultures NGTD  - 11/26 sputum culture MDR pseudomonas - clinically stable, defer treatment unless decompensates as per ID  - 11/26 urine culture - enterococcus - no need to treat - cfu low, organism not significant as per ID  -- see tx of sacral OM below

## 2023-11-28 NOTE — PROGRESS NOTE ADULT - PROBLEM SELECTOR PLAN 2
-c/w levothyroxine 125mcg daily   TSH and free thyroxine wnl  Please make sure LT4 is given on an empty stomach at least one hour apart from other meds and food to ensure med absorption. DO NOT GIVE WITH VITAMINS/ANTACIDS  Noted pt getting LT4 at the time that TFs are started affecting med absorption.    Needs RD consult to assess timing of TFs administration.

## 2023-11-28 NOTE — PROGRESS NOTE ADULT - PROBLEM SELECTOR PLAN 5
- Home amlodipine held on admission,)__ restarted today -- continue monitoring BP   - Echo from 10/17/2020 notable for hyperdynamic L ventricular systolic function, moderately severe LVOT obstruction, and EF 81% (per Wilkinson calculation) vs 77% (per Teichholz calculation) - Home amlodipine held on admission, restarted -- continue monitoring BP   - Echo from 10/17/2020 notable for hyperdynamic L ventricular systolic function, moderately severe LVOT obstruction, and EF 81% (per Wilkinson calculation) vs 77% (per Teichholz calculation)

## 2023-11-28 NOTE — PROGRESS NOTE ADULT - NSPROGADDITIONALINFOA_GEN_ALL_CORE
-Plan discussed with pt/team.  Contact info: [phone] (24/7). pager [phone]  Amion on [employer]  Teams on M-T-W-F. Unavailable Thu/Weekends/Holidays  Assessed pt/labs/meds and discussed plan of care with primary team  Adjusting insulin  Discharge plan  Follow up care

## 2023-11-28 NOTE — PROGRESS NOTE ADULT - ASSESSMENT
72 y/o F with PMHx of T2DM, HTN, HLD, anemia, hypothyroidism, RA, fibromyalgia, remote cerebral aneurysm repair, acute hypercapnic respiratory failure now with tracheostomy, PEG tube, and bedbound (trach change 8/18, PEG change 8/14), sacral pressure ulcer, BIBEMS from home for 6 day hx of bleeding from the tracheostomy and PEG tube with associated abdominal pain, recent history of auditory and visual hallucinations, and with chronic sacral decubitus ulcer. Exam notable for mild abdominal distention with LLQ and RLQ tenderness, tracheostomy in place with no obvious bleeding, PEG tube with scant amount of dried blood, at baseline neurologic status. Imaging showing no active bleeding around tracheostomy site, however was notable for mild thickening along PEG tube tract, sacral ulcer extending to the coccyx suggestive of possible osteomyelitis, and bibasilar patchy lung opacities with volume loss. Patient admitted for respiratory support, wound care of tracheostomy and PEG, and management of sacral osteomyelitis. No further Trach and peg site  bleeding noted since admission.  Pelvic MRI imaging also suggestive of Acute OM. Patient placed on long-term antibiotics with Infectious disease guidance.  Additionally treated with a 7d course of Cefepime due to PSA and Serratia tracheitis on 11/8-->11/15 and then resumed cipro/keflex.  Hospital course c/b Delirium for which Seroquel was added and home Lexapro continued. Tracheostomy changed to #9 Cuffed Portex by ENT 11/3.  Despite trach change patient remained with persistent air leak.  On 11/19, the trach was again changed due to leak and loss of volumes on vent.  Trach was changed to #8 distal cuffed Shiley.  Patient seen by Dermatology for back lesions.  One diagnosed as a seborrheic keratitis and the other a dermatofibroma. 1    11/18:  Attempted trach collar which was tolerated for over 2.5 hours  however ABG revealed acute respiratory acidosis.  Thus patient placed back on vent.  Subsequent ABG with mild improvement thus RR increased from 12 to 16.     11/20: c/w Trial of CPAP w high setting, wean as tolerated. Vent as needed . - c/w current abx. as per ID pharmacist, multiple drug interactions - recommend to change Cipro to IV   11/21-- reevaluated by GI for bleeding from PEG and RLQ abdominal pain -- no acute intervention       Wound Consult requested to assist w/ management of:  Sacral stage 4 pressure injury      Sacral wound- improving, continue w/Aquacel dressing QD  Trach Mngt as per RCU  PEG Mngt as per GI  BLE elevation  Abx per RCU/ ID  Moisturize intact skin w/ SWEEN cream BID  Nutrition Consult for optimization          encourage high quality protein, ayush/ prosource, MVI & Vit C to promote wound healing  Hyperglycemia -  ADA diet and  FS w/ ISS,  Continue turning and positioning w/ offloading assistive devices as per protocol  Buttock Kanchan BID and prn soiling        Continue w/ attends under pads and Pericare w/ emerson cath maintenance as per protocol  Waffle Cushion to chair when oob to chair  Continue w/ low air loss pressure redistribution bed surface   Pt will need Group 2 mattress on hospital bed and ROHO cushion for wheel chair upon discharge home  Care as per RCU, will follow w/ you  Upon discharge f/u as outpatient at [facility] [address] [place] [address] [phone]  d/w mayr  RN team   Angela Anthony PA-C CWS [id number]  Nights/ Weekends/ Holidays please call:  General Surgery Consult pager [phone]) for emergencies  Wound PT for multilayer leg wrapping or VAC issues ([phone])   I spent  35 minutes face to face w/ this pt of which more than 50% of the time was spent counseling & coordinating care of this pt.

## 2023-11-28 NOTE — PROGRESS NOTE ADULT - PROBLEM SELECTOR PLAN 7
- s/p Home Levemir 14 units in morning held on admission as noted w/ hypoglycemia   - Enteral feed changed to Bemba diabetic formula  - Persistent hyperglycemia:  Endocrine consulted. - s/p Home Levemir 14 units in morning held on admission as noted w/ hypoglycemia   - Enteral feed changed to [employer] diabetic formula

## 2023-11-28 NOTE — PROGRESS NOTE ADULT - PROBLEM SELECTOR PLAN 1
Test BG q6h while on TFs/NPO  -Change lantus to 20 units daily in am  -C/w 17 units regular insulin at  6AM, 12PM, and 6PM. PLEASE DO NOT HOLD. CAN GIVE LOWER DOSE IF CONCERN FOR HYPOGLYCEMIA.   -If BG remains difficult to achieve will consider NPH insulin regimen instead,    -C/w moderate admelog correction scale every 6 hours  -Will follow and adjust insulin as needed  DISCHARGE:  -Will be determined according to BG/insulin needs/TFs and steroid therapy at time of discharge.  -Present insulin regimen and TF timing difficult to follow as out pt. Pt has TFs until 3am and also is getting 3 different types ot insulin. Need to adjust TFs time and rate before switching to a more simple insulin regimen.   -Needs telemedicine as out pt due to bedbound status. Can follow at [facility] practice. [address]. Phone . Will send email closer to discharge  Make sure pt has Rx for all DM supplies and insulin.

## 2023-11-28 NOTE — PROGRESS NOTE ADULT - ASSESSMENT
72 y/o F with PMHx of T2DM, HTN, HLD, anemia, hypothyroidism, RA, fibromyalgia, remote cerebral aneurysm repair, acute hypercapnic respiratory failure now with tracheostomy, PEG tube, and bedbound (trach change 8/18, PEG change 8/14), sacral pressure ulcer, BIBEMS from home for 6 day hx of bleeding from the tracheostomy and PEG tube with associated abdominal pain, recent history of auditory and visual hallucinations, and with chronic sacral decubitus ulcer. Exam notable for mild abdominal distention with LLQ and RLQ tenderness, tracheostomy in place with no obvious bleeding, PEG tube with scant amount of dried blood, at baseline neurologic status. Imaging showing no active bleeding around tracheostomy site, however was notable for mild thickening along PEG tube tract, sacral ulcer extending to the coccyx suggestive of possible osteomyelitis, and bibasilar patchy lung opacities with volume loss. Patient admitted for respiratory support, wound care of tracheostomy and PEG, and management of sacral osteomyelitis. No further Trach and peg site  bleeding noted since admission.  Pelvic MRI imaging also suggestive of Acute OM. Patient placed on long-term antibiotics with Infectious disease guidance.  Additionally treated with a 7d course of Cefepime due to PSA and Serratia tracheitis on 11/8-->11/15 and then resumed cipro/keflex.  Hospital course c/b Delirium for which Seroquel was added and home Lexapro continued. Tracheostomy changed to #9 Cuffed [place] by ENT 11/3.  Despite trach change patient remained with persistent air leak.  On 11/19, the trach was again changed due to leak and loss of volumes on vent.  Trach was changed to #8 distal cuffed Shiley.  Patient seen by Dermatology for back lesions.  One diagnosed as a seborrheic keratitis and the other a dermatofibroma.     11/27: MDR pseudomonas now on contact precautions.  Minimal secretions, no acute respiratory distress noted.  ID consulted - monitor off antibiotics for now.  Hx of recent dental abscess as per pts daughter - dental consulted.  S/p blood transfusion yesterday with good response - reached out to heme, waiting for response.  Frequent BMs, miralax and senna d/c'd.  Daughter updated in status/POC. 74 y/o F with PMHx of T2DM, HTN, HLD, anemia, hypothyroidism, RA, fibromyalgia, remote cerebral aneurysm repair, acute hypercapnic respiratory failure now with tracheostomy, PEG tube, and bedbound (trach change 8/18, PEG change 8/14), sacral pressure ulcer, BIBEMS from home for 6 day hx of bleeding from the tracheostomy and PEG tube with associated abdominal pain, recent history of auditory and visual hallucinations, and with chronic sacral decubitus ulcer. Exam notable for mild abdominal distention with LLQ and RLQ tenderness, tracheostomy in place with no obvious bleeding, PEG tube with scant amount of dried blood, at baseline neurologic status. Imaging showing no active bleeding around tracheostomy site, however was notable for mild thickening along PEG tube tract, sacral ulcer extending to the coccyx suggestive of possible osteomyelitis, and bibasilar patchy lung opacities with volume loss. Patient admitted for respiratory support, wound care of tracheostomy and PEG, and management of sacral osteomyelitis. No further Trach and peg site  bleeding noted since admission.  Pelvic MRI imaging also suggestive of Acute OM. Patient placed on long-term antibiotics with Infectious disease guidance.  Additionally treated with a 7d course of Cefepime due to PSA and Serratia tracheitis on 11/8-->11/15 and then resumed cipro/keflex.  Hospital course c/b Delirium for which Seroquel was added and home Lexapro continued. Tracheostomy changed to #9 Cuffed [place] by ENT 11/3.  Despite trach change patient remained with persistent air leak.  On 11/19, the trach was again changed due to leak and loss of volumes on vent.  Trach was changed to #8 distal cuffed Shiley.  Patient seen by Dermatology for back lesions.  One diagnosed as a seborrheic keratitis and the other a dermatofibroma.       11/28:  Diarrhea yesterday, since then BMs have significantly improved.  Seen by Heme, no further recs - AOCD.  Seen by dental yesterday, no abscess on exam.  Finger sticks have been low, will f/u with endocrine.

## 2023-11-28 NOTE — PROGRESS NOTE ADULT - PROBLEM SELECTOR PLAN 2
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   - S/p CT abd/pelvis (10/28): Sacral decubitus ulcer extending to the coccyx with osseous remodeling and pelvic MRI or bone scan to recc to exclude osteomyelitis.  - MRI pelvis 10/30 with Osteomyelitis, c/w current Cefepime for 7 days, Vancomycin d/marli   - Elevated, ESR 85   - Elevated,    - Wound care on board  -11/10:  resumed Cipro 500mg q 12 and Keflex 500mg q 6 until 12/10.  -11/20: Changed to IV Cipro 400 q12 as recommended by ID pharmacist due to multiple drug interactions when given via PEG 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   - S/p CT abd/pelvis (10/28): Sacral decubitus ulcer extending to the coccyx with osseous remodeling and pelvic MRI or bone scan to recc to exclude osteomyelitis.  - MRI pelvis 10/30 with Osteomyelitis, c/w current Cefepime for 7 days, Vancomycin d/marli   - Elevated, ESR 85   - Elevated,    - Wound care on board  -11/10: resumed Cipro 500mg q 12 and Keflex 500mg q 6 until 12/10.  -11/20: Changed to IV Cipro 400 q12 as recommended by ID pharmacist due to multiple drug interactions when given via PEG

## 2023-11-28 NOTE — PROGRESS NOTE ADULT - ASSESSMENT
74 y/o F with PMHx of T2DM, HTN, HLD, anemia, hypothyroidism, RA, fibromyalgia, remote cerebral aneurysm repair, acute hypercapnic respiratory failure now with tracheostomy, PEG tube, and bedbound (trach change 8/18, PEG change 8/14), sacral pressure ulcer, BIBEMS from home 10/28/23for 6 day hx of bleeding from the tracheostomy and PEG tube with associated abdominal pain.   Seen for thrombocytopenia. She has had a mild thrombocytopenia essentially the whole admission and it was present on admission and was actually noted in 2020 also . This could be due to medications such as Escitalopram. Could be ITP. Could be other etiologies like infection as well. No splenomegaly. She was getting Lovenox prior and it was stopped and can be given again.  HIT negative. Since it is safe and stable, no intervention needed except observation.   Has anemia, Direct Coomb's positive but no clinical hemolysis by LDH and haptoglobin.  Creatinine not elevated. No evidence of iron and B12 def.  Likely is  AOCD process. Management is supportive  ID, pulmonary, GI etc following

## 2023-11-28 NOTE — PROGRESS NOTE ADULT - ASSESSMENT
74 y/o F w/h/o T2DM with unknown control due to anemia of chronic disease skewing A1C levels. On Levemir and Humalog insulin PTA. Unknown DM complications. Also h/o HTN, HLD, anemia, hypothyroidism, RA, fibromyalgia, remote cerebral aneurysm repair, acute hypercapnic respiratory failure now with tracheostomy, PEG tube, and bedbound (trach change 8/18, PEG change 8/14), sacral pressure ulcer, BIBEMS from home for 6 day hx of bleeding from the tracheostomy and PEG tube with associated abdominal pain> now resolved. Endocrinology consulted for hyperglycemia. Tolerating TFs form 6am to  3am with variable BG levels. Noted BG <100s on and off likely due to high basal insulin doses when compared to home doses. Also getting TFs for 21 hours instead of 18h as noted pt gets at home. Will decrease basal insulin and have RD re evaluate time and doses of TFs. Noted pt get synthroid at time TFs are started but this med needs to be given on an empty stomach > TFs timing should be changed as well. Spoke to primary team ACP about theses issues. BG goal 100 to low 200s due to age and comorbidities and AMS.      Home medications: Levemir 14 units qhs, Humalog Moderate dose scale (4 units for bg 201-250, 6 units for bg 251-300 , 8 units 301-350) while on [employer] tube feeding 975 ML at home. Per EMR pt get 18h TFs at home

## 2023-11-28 NOTE — PROGRESS NOTE ADULT - TIME BILLING
review of records/results/imaging, medical evaluation/management/documentation, and discussion with patient/daughter/medical providers and coordination of care. Time spent is separate from time spent on ACP. Patient remains FULL CODE at this time. review of records/results/imaging, medical evaluation/management/documentation, and discussion with patient/daughter/medical providers and coordination of care.

## 2023-11-28 NOTE — PROGRESS NOTE ADULT - PROBLEM SELECTOR PLAN 4
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 Multifactorial, possible the PEG has been too tight at home.  - Peg loosened by GI at beside, no leakage or further intervention required   -- recurrent RLQ abdominal pain and bleeding at the PEG site>> __   -- reevaluated by GI __ -- no bleeding at the PEG site  - no BM since 11/19-- increased dose of Senna and increased Miralax to BID   - Continue Simethicone, cont monitoring 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   - Bowel regimen,   - PEG Site appears macerated. Multifactorial, possible the PEG has been too tight at home.  - Peg loosened by GI at beside, no leakage or further intervention required   -- recurrent RLQ abdominal pain and bleeding at the PEG site  -- reevaluated by GI -- no bleeding at the PEG site  - no BM since 11/19 -- increased dose of Senna and increased Miralax to BID   - 11/28 frequent multiple BMs, senna and miralax d/c'd - GI evaluated  - Continue Simethicone, cont monitoring

## 2023-11-28 NOTE — PROGRESS NOTE ADULT - PROBLEM SELECTOR PLAN 1
MRI confirms acute osteomyelitis of sacrum. The patient has had the sacral ulcer for about 12 months.  This is related to a pressure ulcer.  As per description by the family, the wound has improved Substantially.  On Keflex

## 2023-11-29 LAB
ALBUMIN SERPL ELPH-MCNC: 3.4 G/DL — SIGNIFICANT CHANGE UP (ref 3.3–5)
ALBUMIN SERPL ELPH-MCNC: 3.4 G/DL — SIGNIFICANT CHANGE UP (ref 3.3–5)
ALP SERPL-CCNC: 52 U/L — SIGNIFICANT CHANGE UP (ref 40–120)
ALP SERPL-CCNC: 52 U/L — SIGNIFICANT CHANGE UP (ref 40–120)
ALT FLD-CCNC: 23 U/L — SIGNIFICANT CHANGE UP (ref 10–45)
ALT FLD-CCNC: 23 U/L — SIGNIFICANT CHANGE UP (ref 10–45)
ANION GAP SERPL CALC-SCNC: 10 MMOL/L — SIGNIFICANT CHANGE UP (ref 5–17)
ANION GAP SERPL CALC-SCNC: 10 MMOL/L — SIGNIFICANT CHANGE UP (ref 5–17)
AST SERPL-CCNC: 25 U/L — SIGNIFICANT CHANGE UP (ref 10–40)
AST SERPL-CCNC: 25 U/L — SIGNIFICANT CHANGE UP (ref 10–40)
BILIRUB SERPL-MCNC: 0.4 MG/DL — SIGNIFICANT CHANGE UP (ref 0.2–1.2)
BILIRUB SERPL-MCNC: 0.4 MG/DL — SIGNIFICANT CHANGE UP (ref 0.2–1.2)
BUN SERPL-MCNC: 17 MG/DL — SIGNIFICANT CHANGE UP (ref 7–23)
BUN SERPL-MCNC: 17 MG/DL — SIGNIFICANT CHANGE UP (ref 7–23)
CALCIUM SERPL-MCNC: 9.3 MG/DL — SIGNIFICANT CHANGE UP (ref 8.4–10.5)
CALCIUM SERPL-MCNC: 9.3 MG/DL — SIGNIFICANT CHANGE UP (ref 8.4–10.5)
CHLORIDE SERPL-SCNC: 99 MMOL/L — SIGNIFICANT CHANGE UP (ref 96–108)
CHLORIDE SERPL-SCNC: 99 MMOL/L — SIGNIFICANT CHANGE UP (ref 96–108)
CO2 SERPL-SCNC: 28 MMOL/L — SIGNIFICANT CHANGE UP (ref 22–31)
CO2 SERPL-SCNC: 28 MMOL/L — SIGNIFICANT CHANGE UP (ref 22–31)
CREAT SERPL-MCNC: <0.3 MG/DL — LOW (ref 0.5–1.3)
CREAT SERPL-MCNC: <0.3 MG/DL — LOW (ref 0.5–1.3)
EGFR: 113 ML/MIN/1.73M2 — SIGNIFICANT CHANGE UP
EGFR: 113 ML/MIN/1.73M2 — SIGNIFICANT CHANGE UP
GLUCOSE BLDC GLUCOMTR-MCNC: 118 MG/DL — HIGH (ref 70–99)
GLUCOSE BLDC GLUCOMTR-MCNC: 118 MG/DL — HIGH (ref 70–99)
GLUCOSE BLDC GLUCOMTR-MCNC: 138 MG/DL — HIGH (ref 70–99)
GLUCOSE BLDC GLUCOMTR-MCNC: 138 MG/DL — HIGH (ref 70–99)
GLUCOSE BLDC GLUCOMTR-MCNC: 164 MG/DL — HIGH (ref 70–99)
GLUCOSE BLDC GLUCOMTR-MCNC: 164 MG/DL — HIGH (ref 70–99)
GLUCOSE BLDC GLUCOMTR-MCNC: 206 MG/DL — HIGH (ref 70–99)
GLUCOSE BLDC GLUCOMTR-MCNC: 206 MG/DL — HIGH (ref 70–99)
GLUCOSE BLDC GLUCOMTR-MCNC: 94 MG/DL — SIGNIFICANT CHANGE UP (ref 70–99)
GLUCOSE BLDC GLUCOMTR-MCNC: 94 MG/DL — SIGNIFICANT CHANGE UP (ref 70–99)
GLUCOSE SERPL-MCNC: 147 MG/DL — HIGH (ref 70–99)
GLUCOSE SERPL-MCNC: 147 MG/DL — HIGH (ref 70–99)
HCT VFR BLD CALC: 34.6 % — SIGNIFICANT CHANGE UP (ref 34.5–45)
HCT VFR BLD CALC: 34.6 % — SIGNIFICANT CHANGE UP (ref 34.5–45)
HGB BLD-MCNC: 10.4 G/DL — LOW (ref 11.5–15.5)
HGB BLD-MCNC: 10.4 G/DL — LOW (ref 11.5–15.5)
MAGNESIUM SERPL-MCNC: 2.1 MG/DL — SIGNIFICANT CHANGE UP (ref 1.6–2.6)
MAGNESIUM SERPL-MCNC: 2.1 MG/DL — SIGNIFICANT CHANGE UP (ref 1.6–2.6)
MCHC RBC-ENTMCNC: 28 PG — SIGNIFICANT CHANGE UP (ref 27–34)
MCHC RBC-ENTMCNC: 28 PG — SIGNIFICANT CHANGE UP (ref 27–34)
MCHC RBC-ENTMCNC: 30.1 GM/DL — LOW (ref 32–36)
MCHC RBC-ENTMCNC: 30.1 GM/DL — LOW (ref 32–36)
MCV RBC AUTO: 93 FL — SIGNIFICANT CHANGE UP (ref 80–100)
MCV RBC AUTO: 93 FL — SIGNIFICANT CHANGE UP (ref 80–100)
NRBC # BLD: 0 /100 WBCS — SIGNIFICANT CHANGE UP (ref 0–0)
NRBC # BLD: 0 /100 WBCS — SIGNIFICANT CHANGE UP (ref 0–0)
PHOSPHATE SERPL-MCNC: 3.8 MG/DL — SIGNIFICANT CHANGE UP (ref 2.5–4.5)
PHOSPHATE SERPL-MCNC: 3.8 MG/DL — SIGNIFICANT CHANGE UP (ref 2.5–4.5)
PLATELET # BLD AUTO: 125 K/UL — LOW (ref 150–400)
PLATELET # BLD AUTO: 125 K/UL — LOW (ref 150–400)
POTASSIUM SERPL-MCNC: 4.1 MMOL/L — SIGNIFICANT CHANGE UP (ref 3.5–5.3)
POTASSIUM SERPL-MCNC: 4.1 MMOL/L — SIGNIFICANT CHANGE UP (ref 3.5–5.3)
POTASSIUM SERPL-SCNC: 4.1 MMOL/L — SIGNIFICANT CHANGE UP (ref 3.5–5.3)
POTASSIUM SERPL-SCNC: 4.1 MMOL/L — SIGNIFICANT CHANGE UP (ref 3.5–5.3)
PROT SERPL-MCNC: 7.5 G/DL — SIGNIFICANT CHANGE UP (ref 6–8.3)
PROT SERPL-MCNC: 7.5 G/DL — SIGNIFICANT CHANGE UP (ref 6–8.3)
RBC # BLD: 3.72 M/UL — LOW (ref 3.8–5.2)
RBC # BLD: 3.72 M/UL — LOW (ref 3.8–5.2)
RBC # FLD: 16.9 % — HIGH (ref 10.3–14.5)
RBC # FLD: 16.9 % — HIGH (ref 10.3–14.5)
SODIUM SERPL-SCNC: 137 MMOL/L — SIGNIFICANT CHANGE UP (ref 135–145)
SODIUM SERPL-SCNC: 137 MMOL/L — SIGNIFICANT CHANGE UP (ref 135–145)
WBC # BLD: 14.21 K/UL — HIGH (ref 3.8–10.5)
WBC # BLD: 14.21 K/UL — HIGH (ref 3.8–10.5)
WBC # FLD AUTO: 14.21 K/UL — HIGH (ref 3.8–10.5)
WBC # FLD AUTO: 14.21 K/UL — HIGH (ref 3.8–10.5)

## 2023-11-29 PROCEDURE — 99233 SBSQ HOSP IP/OBS HIGH 50: CPT

## 2023-11-29 RX ORDER — LANOLIN ALCOHOL/MO/W.PET/CERES
1 CREAM (GRAM) TOPICAL AT BEDTIME
Refills: 0 | Status: DISCONTINUED | OUTPATIENT
Start: 2023-11-29 | End: 2024-01-17

## 2023-11-29 RX ORDER — LANOLIN ALCOHOL/MO/W.PET/CERES
5 CREAM (GRAM) TOPICAL AT BEDTIME
Refills: 0 | Status: DISCONTINUED | OUTPATIENT
Start: 2023-11-29 | End: 2024-01-17

## 2023-11-29 RX ORDER — OXYCODONE HYDROCHLORIDE 5 MG/1
2.5 TABLET ORAL EVERY 6 HOURS
Refills: 0 | Status: DISCONTINUED | OUTPATIENT
Start: 2023-11-29 | End: 2023-12-01

## 2023-11-29 RX ORDER — DICLOFENAC SODIUM 30 MG/G
2 GEL TOPICAL
Refills: 0 | Status: DISCONTINUED | OUTPATIENT
Start: 2023-11-29 | End: 2024-01-17

## 2023-11-29 RX ADMIN — INSULIN GLARGINE 20 UNIT(S): 100 INJECTION, SOLUTION SUBCUTANEOUS at 08:28

## 2023-11-29 RX ADMIN — SIMETHICONE 80 MILLIGRAM(S): 80 TABLET, CHEWABLE ORAL at 17:10

## 2023-11-29 RX ADMIN — ZINC OXIDE 1 APPLICATION(S): 200 OINTMENT TOPICAL at 13:37

## 2023-11-29 RX ADMIN — INSULIN HUMAN 17 UNIT(S): 100 INJECTION, SOLUTION SUBCUTANEOUS at 18:04

## 2023-11-29 RX ADMIN — Medication 200 MILLIGRAM(S): at 17:10

## 2023-11-29 RX ADMIN — GABAPENTIN 100 MILLIGRAM(S): 400 CAPSULE ORAL at 22:07

## 2023-11-29 RX ADMIN — PHENYLEPHRINE-SHARK LIVER OIL-MINERAL OIL-PETROLATUM RECTAL OINTMENT 1 APPLICATION(S): at 13:05

## 2023-11-29 RX ADMIN — SIMETHICONE 80 MILLIGRAM(S): 80 TABLET, CHEWABLE ORAL at 13:03

## 2023-11-29 RX ADMIN — SODIUM CHLORIDE 4 MILLILITER(S): 9 INJECTION INTRAMUSCULAR; INTRAVENOUS; SUBCUTANEOUS at 06:28

## 2023-11-29 RX ADMIN — Medication 5 MILLIGRAM(S): at 06:21

## 2023-11-29 RX ADMIN — CHLORHEXIDINE GLUCONATE 15 MILLILITER(S): 213 SOLUTION TOPICAL at 06:23

## 2023-11-29 RX ADMIN — Medication 200 MILLIGRAM(S): at 06:22

## 2023-11-29 RX ADMIN — SIMETHICONE 80 MILLIGRAM(S): 80 TABLET, CHEWABLE ORAL at 00:29

## 2023-11-29 RX ADMIN — LIDOCAINE 1 PATCH: 4 CREAM TOPICAL at 01:12

## 2023-11-29 RX ADMIN — Medication 2 DROP(S): at 00:29

## 2023-11-29 RX ADMIN — Medication 1 APPLICATION(S): at 13:36

## 2023-11-29 RX ADMIN — INSULIN HUMAN 17 UNIT(S): 100 INJECTION, SOLUTION SUBCUTANEOUS at 13:05

## 2023-11-29 RX ADMIN — Medication 500 MILLIGRAM(S): at 00:28

## 2023-11-29 RX ADMIN — Medication 2: at 18:03

## 2023-11-29 RX ADMIN — Medication 1 TABLET(S): at 17:10

## 2023-11-29 RX ADMIN — LIDOCAINE 1 PATCH: 4 CREAM TOPICAL at 13:03

## 2023-11-29 RX ADMIN — NYSTATIN CREAM 1 APPLICATION(S): 100000 CREAM TOPICAL at 22:08

## 2023-11-29 RX ADMIN — QUETIAPINE FUMARATE 12.5 MILLIGRAM(S): 200 TABLET, FILM COATED ORAL at 17:13

## 2023-11-29 RX ADMIN — NYSTATIN CREAM 1 APPLICATION(S): 100000 CREAM TOPICAL at 13:06

## 2023-11-29 RX ADMIN — Medication 3 MILLILITER(S): at 23:04

## 2023-11-29 RX ADMIN — Medication 4: at 13:04

## 2023-11-29 RX ADMIN — Medication 500 MILLIGRAM(S): at 13:03

## 2023-11-29 RX ADMIN — SIMETHICONE 80 MILLIGRAM(S): 80 TABLET, CHEWABLE ORAL at 06:22

## 2023-11-29 RX ADMIN — Medication 125 MICROGRAM(S): at 06:22

## 2023-11-29 RX ADMIN — PANTOPRAZOLE SODIUM 40 MILLIGRAM(S): 20 TABLET, DELAYED RELEASE ORAL at 06:22

## 2023-11-29 RX ADMIN — Medication 1 APPLICATION(S): at 18:05

## 2023-11-29 RX ADMIN — Medication 2 DROP(S): at 06:23

## 2023-11-29 RX ADMIN — CHLORHEXIDINE GLUCONATE 1 APPLICATION(S): 213 SOLUTION TOPICAL at 06:24

## 2023-11-29 RX ADMIN — Medication 500 MILLIGRAM(S): at 06:22

## 2023-11-29 RX ADMIN — Medication 3 MILLILITER(S): at 17:24

## 2023-11-29 RX ADMIN — Medication 1 MILLIGRAM(S): at 22:08

## 2023-11-29 RX ADMIN — Medication 3 MILLILITER(S): at 11:14

## 2023-11-29 RX ADMIN — Medication 1 APPLICATION(S): at 06:22

## 2023-11-29 RX ADMIN — Medication 1 APPLICATION(S): at 00:29

## 2023-11-29 RX ADMIN — AMLODIPINE BESYLATE 10 MILLIGRAM(S): 2.5 TABLET ORAL at 06:22

## 2023-11-29 RX ADMIN — Medication 5 MILLIGRAM(S): at 22:07

## 2023-11-29 RX ADMIN — SODIUM CHLORIDE 4 MILLILITER(S): 9 INJECTION INTRAMUSCULAR; INTRAVENOUS; SUBCUTANEOUS at 17:25

## 2023-11-29 RX ADMIN — ESCITALOPRAM OXALATE 10 MILLIGRAM(S): 10 TABLET, FILM COATED ORAL at 08:30

## 2023-11-29 RX ADMIN — Medication 5 MILLIGRAM(S): at 13:03

## 2023-11-29 RX ADMIN — OXYCODONE HYDROCHLORIDE 2.5 MILLIGRAM(S): 5 TABLET ORAL at 19:00

## 2023-11-29 RX ADMIN — Medication 1 APPLICATION(S): at 22:08

## 2023-11-29 RX ADMIN — CHLORHEXIDINE GLUCONATE 15 MILLILITER(S): 213 SOLUTION TOPICAL at 17:10

## 2023-11-29 RX ADMIN — Medication 1000 MILLIGRAM(S): at 15:00

## 2023-11-29 RX ADMIN — Medication 15 MILLILITER(S): at 13:03

## 2023-11-29 RX ADMIN — Medication 2 DROP(S): at 13:04

## 2023-11-29 RX ADMIN — Medication 500 MILLIGRAM(S): at 17:10

## 2023-11-29 RX ADMIN — Medication 1000 MILLIGRAM(S): at 14:32

## 2023-11-29 RX ADMIN — INSULIN HUMAN 17 UNIT(S): 100 INJECTION, SOLUTION SUBCUTANEOUS at 06:26

## 2023-11-29 RX ADMIN — Medication 500 MILLIGRAM(S): at 22:07

## 2023-11-29 RX ADMIN — SIMETHICONE 80 MILLIGRAM(S): 80 TABLET, CHEWABLE ORAL at 22:06

## 2023-11-29 RX ADMIN — Medication 3 MILLILITER(S): at 06:28

## 2023-11-29 RX ADMIN — Medication 2 DROP(S): at 17:11

## 2023-11-29 RX ADMIN — NYSTATIN CREAM 1 APPLICATION(S): 100000 CREAM TOPICAL at 06:23

## 2023-11-29 RX ADMIN — LIDOCAINE 1 PATCH: 4 CREAM TOPICAL at 19:02

## 2023-11-29 RX ADMIN — Medication 1 TABLET(S): at 06:21

## 2023-11-29 RX ADMIN — Medication 1 APPLICATION(S): at 06:23

## 2023-11-29 RX ADMIN — Medication 2 DROP(S): at 22:08

## 2023-11-29 NOTE — PROGRESS NOTE ADULT - ASSESSMENT
72 y/o F with PMHx of T2DM, HTN, HLD, anemia, hypothyroidism, RA, fibromyalgia, remote cerebral aneurysm repair, acute hypercapnic respiratory failure now with tracheostomy, PEG tube, and bedbound (trach change 8/18, PEG change 8/14), sacral pressure ulcer, BIBEMS from home for 6 day hx of bleeding from the tracheostomy and PEG tube with associated abdominal pain, recent history of auditory and visual hallucinations, and with chronic sacral decubitus ulcer. Exam notable for mild abdominal distention with LLQ and RLQ tenderness, tracheostomy in place with no obvious bleeding, PEG tube with scant amount of dried blood, at baseline neurologic status. Imaging showing no active bleeding around tracheostomy site, however was notable for mild thickening along PEG tube tract, sacral ulcer extending to the coccyx suggestive of possible osteomyelitis, and bibasilar patchy lung opacities with volume loss. Patient admitted for respiratory support, wound care of tracheostomy and PEG, and management of sacral osteomyelitis. No further Trach and peg site  bleeding noted since admission.  Pelvic MRI imaging also suggestive of Acute OM. Patient placed on long-term antibiotics with Infectious disease guidance.  Additionally treated with a 7d course of Cefepime due to PSA and Serratia tracheitis on 11/8-->11/15 and then resumed cipro/keflex.  Hospital course c/b Delirium for which Seroquel was added and home Lexapro continued. Tracheostomy changed to #9 Cuffed [place] by ENT 11/3.  Despite trach change patient remained with persistent air leak.  On 11/19, the trach was again changed due to leak and loss of volumes on vent.  Trach was changed to #8 distal cuffed Shiley.  Patient seen by Dermatology for back lesions.  One diagnosed as a seborrheic keratitis and the other a dermatofibroma.       11/28:  Diarrhea yesterday, since then BMs have significantly improved.  Seen by Heme, no further recs - AOCD.  Seen by dental yesterday, no abscess on exam.  Finger sticks have been low, will f/u with endocrine. 72 y/o F with PMHx of T2DM, HTN, HLD, anemia, hypothyroidism, RA, fibromyalgia, remote cerebral aneurysm repair, acute hypercapnic respiratory failure now with tracheostomy, PEG tube, and bedbound (trach change 8/18, PEG change 8/14), sacral pressure ulcer, BIBEMS from home for 6 day hx of bleeding from the tracheostomy and PEG tube with associated abdominal pain, recent history of auditory and visual hallucinations, and with chronic sacral decubitus ulcer. Exam notable for mild abdominal distention with LLQ and RLQ tenderness, tracheostomy in place with no obvious bleeding, PEG tube with scant amount of dried blood, at baseline neurologic status. Imaging showing no active bleeding around tracheostomy site, however was notable for mild thickening along PEG tube tract, sacral ulcer extending to the coccyx suggestive of possible osteomyelitis, and bibasilar patchy lung opacities with volume loss. Patient admitted for respiratory support, wound care of tracheostomy and PEG, and management of sacral osteomyelitis. No further Trach and peg site  bleeding noted since admission.  Pelvic MRI imaging also suggestive of Acute OM. Patient placed on long-term antibiotics with Infectious disease guidance.  Additionally treated with a 7d course of Cefepime due to PSA and Serratia tracheitis on 11/8-->11/15 and then resumed cipro/keflex.  Hospital course c/b Delirium for which Seroquel was added and home Lexapro continued. Tracheostomy changed to #9 Cuffed [place] by ENT 11/3.  Despite trach change patient remained with persistent air leak.  On 11/19, the trach was again changed due to leak and loss of volumes on vent.  Trach was changed to #8 distal cuffed Shiley.  Patient seen by Dermatology for back lesions.  One diagnosed as a seborrheic keratitis and the other a dermatofibroma.       11/29:  WBC increased on this AM labs, monitoring for now.  Tube feeds adjusted as per nutrition and endocrine recs.

## 2023-11-29 NOTE — PROGRESS NOTE ADULT - PROBLEM SELECTOR PLAN 2
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   - S/p CT abd/pelvis (10/28): Sacral decubitus ulcer extending to the coccyx with osseous remodeling and pelvic MRI or bone scan to recc to exclude osteomyelitis.  - MRI pelvis 10/30 with Osteomyelitis, c/w current Cefepime for 7 days, Vancomycin d/marli   - Elevated, ESR 85   - Elevated,    - Wound care on board  -11/10: resumed Cipro 500mg q 12 and Keflex 500mg q 6 until 12/10.  -11/20: Changed to IV Cipro 400 q12 as recommended by ID pharmacist due to multiple drug interactions when given via PEG 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   - S/p CT abd/pelvis (10/28): Sacral decubitus ulcer extending to the coccyx with osseous remodeling and pelvic MRI or bone scan to recc to exclude osteomyelitis.  - 10/30 wound care note: Sacral stage 4 pressure injury 4.5cm x 2.5cm x 1.5cm w/ lip of undermining circumferentially greatest 9-12 of 3cm.  - MRI pelvis 10/30 with Osteomyelitis, c/w current Cefepime for 7 days, Vancomycin d/marli   - Elevated, ESR 85   - Elevated,    - Wound care on board  -11/10: resumed Cipro 500mg q 12 and Keflex 500mg q 6 until 12/10.  -11/20: Changed to IV Cipro 400 q12 as recommended by ID pharmacist due to multiple drug interactions when given via PEG  - 11/28 wound care note: Sacral stage 4pressure injury 4.5cm x 2.5cm x 0.5cm, moist granular wound bed   palpable but not exposed bone no blistering, (+) serosanguinous drainage. No odor, erythema, increased warmth, tenderness, induration, fluctuance, nor crepitus. 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   - S/p CT abd/pelvis (10/28): Sacral decubitus ulcer extending to the coccyx with osseous remodeling and pelvic MRI or bone scan to recc to exclude osteomyelitis.  - 10/30 wound care note: Sacral stage 4 pressure injury 4.5cm x 2.5cm x 1.5cm w/ lip of undermining circumferentially greatest 9-12 of 3cm.  - MRI pelvis 10/30 with Osteomyelitis, c/w current Cefepime for 7 days, Vancomycin d/marli   - Elevated, ESR 85   - Elevated,    - Wound care on board  - 11/10: resumed Cipro 500mg q 12 and Keflex 500mg q 6 until 12/10.  - 11/20: Changed to IV Cipro 400 q12 as recommended by ID pharmacist due to multiple drug interactions when given via PEG  - 11/28 wound care note: Sacral stage 4pressure injury 4.5cm x 2.5cm x 0.5cm, moist granular wound bed   palpable but not exposed bone no blistering, (+) serosanguinous drainage. No odor, erythema, increased warmth, tenderness, induration, fluctuance, nor crepitus  - wound culture pending

## 2023-11-29 NOTE — PROGRESS NOTE ADULT - PROBLEM SELECTOR PLAN 7
- s/p Home Levemir 14 units in morning held on admission as noted w/ hypoglycemia   - Enteral feed changed to [employer] diabetic formula

## 2023-11-29 NOTE — PROGRESS NOTE ADULT - TIME BILLING
review of records/results/imaging, medical evaluation/management/documentation, and discussion with patient/daughter/medical providers and coordination of care.

## 2023-11-29 NOTE — PROGRESS NOTE ADULT - ASSESSMENT
74 y/o F with PMHx of T2DM, HTN, HLD, anemia, hypothyroidism, RA, fibromyalgia, remote cerebral aneurysm repair, acute hypercapnic respiratory failure now with tracheostomy, PEG tube, and bedbound (trach change 8/18, PEG change 8/14), sacral pressure ulcer, BIBEMS from home 10/28/23for 6 day hx of bleeding from the tracheostomy and PEG tube with associated abdominal pain.   Seen for thrombocytopenia. She has had a mild thrombocytopenia essentially the whole admission and it was present on admission and was actually noted in 2020 also . This could be due to medications such as Escitalopram. Could be ITP. Could be other etiologies like infection as well. No splenomegaly. She was getting Lovenox prior and it was stopped and can be given again.  HIT negative. Since it is safe and stable, no intervention needed except observation.   Has anemia, Direct Coomb's positive but no clinical hemolysis by LDH and haptoglobin.  Creatinine not elevated. No evidence of iron and B12 def.  Likely is  AOCD process. Management is supportive, At  present hb and platelets stable. WBC little higher today, likely reactive  ID, pulmonary, wound care, GI etc following

## 2023-11-29 NOTE — PROGRESS NOTE ADULT - PROBLEM SELECTOR PLAN 8
Continue Home Lexapro 10mg daily   Psych consult appreciated  Continue Seroquel 12.5mg daily (3PM) Continue Home Lexapro 10mg daily   Psych consult appreciated   - Continue Seroquel 12.5mg daily (3PM)

## 2023-11-29 NOTE — PROGRESS NOTE ADULT - PROBLEM SELECTOR PLAN 9
DVT PPx:  Lovenox discontinued given daughter's concern for trach and PEG stoma bleeding/oozing.  Also with mild thrombocytopenia.  Will continue SCDs.     GOC: Full code  __ long discussion of MICU team with daughter, pt remains FULL CODE, daughter wishes MOLST form reevaluation once pt is less delirious DVT PPx: Lovenox discontinued given daughter's concern for trach and PEG stoma bleeding/oozing.  Also with mild thrombocytopenia.  Will continue SCDs.     GOC: Full code  __ long discussion of MICU team with daughter, pt remains FULL CODE, daughter wishes MOLST form reevaluation once pt is less delirious

## 2023-11-29 NOTE — PROGRESS NOTE ADULT - SUBJECTIVE AND OBJECTIVE BOX
72 y/o F with PMHx of T2DM, HTN, HLD, anemia, hypothyroidism, RA, fibromyalgia, remote cerebral aneurysm repair, acute hypercapnic respiratory failure now with tracheostomy, PEG tube, and bedbound (trach change 8/18, PEG change 8/14), sacral pressure ulcer, BIBEMS from home 10/28/23for 6 day hx of bleeding from the tracheostomy and PEG tube with associated abdominal pain.   Seen for thrombocytopenia. She has had a mild thrombocytopenia essentially the whole admission and it was present on admission. There are no records from prior to admission.  She also has anemia.     PAST MEDICAL & SURGICAL HISTORY:  Diabetes      Rheumatoid arthritis      Fibromyalgia      Hypothyroid      Hypertension      H/O tracheostomy      PEG (percutaneous endoscopic gastrostomy) status        Allergies    penicillin (Unknown)  heparin (Unknown)  Tagamet (Unknown)  pineapple (Unknown)  walnut (Unknown)  Pecan, Filbert, Hazelnut (Unknown)  Lyrica (Unknown)    Intolerances      Social History:  lives at home with family  dependent for all ADL  no toxic habits  FULL CODE (28 Oct 2023 04:18)    Medications:  acetaminophen   Oral Liquid .. 1000 milliGRAM(s) Enteral Tube every 6 hours PRN Temp greater or equal to 38C (100.4F), Mild Pain (1 - 3)  albuterol/ipratropium for Nebulization 3 milliLiter(s) Nebulizer every 6 hours  amLODIPine   Tablet 10 milliGRAM(s) Oral daily  artificial  tears Solution 2 Drop(s) Both EYES four times a day  ascorbic acid 500 milliGRAM(s) Oral daily  benzocaine 20% Spray 1 Spray(s) Topical four times a day PRN Cough  calcium carbonate    500 mG (Tums) Chewable 1 Tablet(s) Chew every 12 hours  cephalexin 500 milliGRAM(s) Oral every 6 hours  chlorhexidine 0.12% Liquid 15 milliLiter(s) Oral Mucosa every 12 hours  chlorhexidine 4% Liquid 1 Application(s) Topical <User Schedule>  ciprofloxacin   IVPB 400 milliGRAM(s) IV Intermittent every 12 hours  dextrose 50% Injectable 12.5 Gram(s) IV Push once  dextrose 50% Injectable 25 Gram(s) IV Push once  dextrose 50% Injectable 50 milliLiter(s) IV Push once  dextrose 50% Injectable 25 Gram(s) IV Push once  dextrose Oral Gel 15 Gram(s) Oral once  diphenhydrAMINE Elixir 25 milliGRAM(s) Oral every 6 hours PRN Rash and/or Itching  escitalopram 10 milliGRAM(s) Oral <User Schedule>  gabapentin Solution 100 milliGRAM(s) Enteral Tube at bedtime  glucagon  Injectable 1 milliGRAM(s) IntraMuscular once  guaifenesin/dextromethorphan Oral Liquid 10 milliLiter(s) Oral every 6 hours PRN Cough  hemorrhoidal Ointment      hemorrhoidal Ointment 1 Application(s) Rectal daily  insulin glargine Injectable (LANTUS) 20 Unit(s) SubCutaneous every morning  insulin lispro (ADMELOG) corrective regimen sliding scale   SubCutaneous every 6 hours  insulin regular  human recombinant 17 Unit(s) SubCutaneous <User Schedule>  levothyroxine 125 MICROGram(s) Oral <User Schedule>  lidocaine   4% Patch 1 Patch Transdermal daily  melatonin 3 milliGRAM(s) Oral at bedtime  multivitamin/minerals/iron Oral Solution (CENTRUM) 15 milliLiter(s) Oral daily  nystatin Powder 1 Application(s) Topical every 8 hours  oxybutynin 5 milliGRAM(s) Oral daily  pantoprazole   Suspension 40 milliGRAM(s) Enteral Tube <User Schedule>  petrolatum Ophthalmic Ointment 1 Application(s) Both EYES four times a day  predniSONE   Tablet 5 milliGRAM(s) Oral daily  QUEtiapine 12.5 milliGRAM(s) Oral <User Schedule>  simethicone 80 milliGRAM(s) Chew four times a day  sodium chloride 0.65% Nasal 1 Spray(s) Both Nostrils every 6 hours PRN Nasal Congestion  sodium chloride 3%  Inhalation 4 milliLiter(s) Inhalation every 12 hours  triamcinolone 0.1% Ointment 1 Application(s) Topical two times a day  zinc oxide 40% Paste 1 Application(s) Topical daily    Labs:  CBC Full  -  ( 29 Nov 2023 06:38 )  WBC Count : 14.21 K/uL  RBC Count : 3.72 M/uL  Hemoglobin : 10.4 g/dL  Hematocrit : 34.6 %  Platelet Count - Automated : 125 K/uL  Mean Cell Volume : 93.0 fl  Mean Cell Hemoglobin : 28.0 pg  Mean Cell Hemoglobin Concentration : 30.1 gm/dL  Auto Neutrophil # : x  Auto Lymphocyte # : x  Auto Monocyte # : x  Auto Eosinophil # : x  Auto Basophil # : x  Auto Neutrophil % : x  Auto Lymphocyte % : x  Auto Monocyte % : x  Auto Eosinophil % : x  Auto Basophil % : x    11-29    137  |  99  |  17  ----------------------------<  147<H>  4.1   |  28  |  <0.30<L>    Ca    9.3      29 Nov 2023 06:38  Phos  3.8     11-29  Mg     2.1     11-29    TPro  7.5  /  Alb  3.4  /  TBili  0.4  /  DBili  x   /  AST  25  /  ALT  23  /  AlkPhos  52  11-29      Radiology:             ROS:  Patient comfortable without distress  No SOB or chest pain  No palpitation  No abdominal pain, diarrhaea or constipation  No weakness of extremities  No skin changes or swelling of legs  Rest of the comprehensive ROS was negative  Vital Signs Last 24 Hrs  T(C): 36.5 (29 Nov 2023 04:46), Max: 36.9 (28 Nov 2023 20:52)  T(F): 97.7 (29 Nov 2023 04:46), Max: 98.5 (28 Nov 2023 20:52)  HR: 91 (29 Nov 2023 09:04) (76 - 108)  BP: 136/64 (29 Nov 2023 04:46) (110/57 - 136/64)  BP(mean): --  RR: 15 (29 Nov 2023 04:46) (15 - 21)  SpO2: 99% (29 Nov 2023 09:04) (98% - 100%)    Parameters below as of 29 Nov 2023 06:28  Patient On (Oxygen Delivery Method): ventilator        Physical exam:  Patient alert, has trach  No distress  CVS: S1, S2   Chest: bilateral breath sound without rales  Abdomen: soft, not tender, no organomegaly or masses, PEG +  CNS: No focal neuro deficit  Musculoskeletal:  Normal range of motion  Skin: Decubiti as per chart    Assessment and Plan:

## 2023-11-29 NOTE — PROGRESS NOTE ADULT - SUBJECTIVE AND OBJECTIVE BOX
Patient is a 73y old  Female who presents with a chief complaint of peg tube   DATE OF SERVICE: 11-29-23      SUBJECTIVE / OVERNIGHT EVENTS: Afebrile.  no new events      MEDICATIONS  (STANDING):  albuterol/ipratropium for Nebulization 3 milliLiter(s) Nebulizer every 6 hours  artificial  tears Solution 2 Drop(s) Both EYES four times a day  ascorbic acid 500 milliGRAM(s) Oral daily  bacitracin   Ointment 1 Application(s) Topical two times a day  bisacodyl Suppository 10 milliGRAM(s) Rectal once  calcium carbonate    500 mG (Tums) Chewable 1 Tablet(s) Chew every 12 hours  cephalexin 500 milliGRAM(s) Oral every 6 hours  chlorhexidine 0.12% Liquid 15 milliLiter(s) Oral Mucosa every 12 hours  chlorhexidine 4% Liquid 1 Application(s) Topical <User Schedule>  ciprofloxacin     Tablet 500 milliGRAM(s) Oral every 12 hours  dextrose 50% Injectable 50 milliLiter(s) IV Push once  enoxaparin Injectable 40 milliGRAM(s) SubCutaneous every 24 hours  escitalopram 10 milliGRAM(s) Oral daily  fentaNYL   Patch  25 MICROgram(s)/Hr 1 Patch Transdermal every 72 hours  gabapentin Solution 100 milliGRAM(s) Enteral Tube at bedtime  insulin glargine Injectable (LANTUS) 14 Unit(s) SubCutaneous every morning  insulin lispro (ADMELOG) corrective regimen sliding scale   SubCutaneous every 6 hours  levothyroxine 125 MICROGram(s) Oral <User Schedule>  lidocaine   4% Patch 1 Patch Transdermal daily  melatonin 3 milliGRAM(s) Oral at bedtime  multivitamin/minerals/iron Oral Solution (CENTRUM) 15 milliLiter(s) Oral daily  nystatin Powder 1 Application(s) Topical every 8 hours  oxybutynin 5 milliGRAM(s) Oral daily  pantoprazole   Suspension 40 milliGRAM(s) Enteral Tube daily  petrolatum Ophthalmic Ointment 1 Application(s) Both EYES four times a day  predniSONE   Tablet 5 milliGRAM(s) Oral daily  QUEtiapine 12.5 milliGRAM(s) Oral <User Schedule>  QUEtiapine 12.5 milliGRAM(s) Oral <User Schedule>  senna Syrup 10 milliLiter(s) Oral at bedtime  simethicone 80 milliGRAM(s) Chew four times a day  sodium chloride 3%  Inhalation 4 milliLiter(s) Inhalation every 12 hours  triamcinolone 0.1% Ointment 1 Application(s) Topical two times a day  zinc oxide 40% Paste 1 Application(s) Topical daily  zinc sulfate 220 milliGRAM(s) Oral daily    MEDICATIONS  (PRN):  acetaminophen   Oral Liquid .. 1000 milliGRAM(s) Enteral Tube every 6 hours PRN Temp greater or equal to 38C (100.4F), Mild Pain (1 - 3)  benzocaine 20% Spray 1 Spray(s) Topical four times a day PRN Cough  diphenhydrAMINE Elixir 25 milliGRAM(s) Oral every 6 hours PRN Rash and/or Itching  oxyCODONE    Solution 2.5 milliGRAM(s) Oral every 6 hours PRN Moderate Pain (4 - 6)  sodium chloride 0.65% Nasal 1 Spray(s) Both Nostrils every 6 hours PRN Nasal Congestion          I&O's Summary    17 Nov 2023 07:01  -  18 Nov 2023 07:00  --------------------------------------------------------  IN: 1300 mL / OUT: 950 mL / NET: 350 mL    18 Nov 2023 07:01  -  18 Nov 2023 17:32  --------------------------------------------------------  IN: 0 mL / OUT: 400 mL / NET: -400 mL        PHYSICAL EXAM:  Vital Signs Last 24 Hrs  T(C): 36.5 (29 Nov 2023 14:11), Max: 36.9 (28 Nov 2023 20:52)  T(F): 97.7 (29 Nov 2023 14:11), Max: 98.5 (28 Nov 2023 20:52)  HR: 90 (29 Nov 2023 15:02) (80 - 108)  BP: 124/67 (29 Nov 2023 14:11) (110/57 - 136/64)  BP(mean): --  RR: 17 (29 Nov 2023 15:02) (15 - 21)  SpO2: 99% (29 Nov 2023 15:02) (98% - 100%)    Parameters below as of 29 Nov 2023 15:02  Patient On (Oxygen Delivery Method): ventilator      GENERAL: NAD, well-developed  HEAD:  Atraumatic, Normocephalic  EYES: EOMI, PERRLA, conjunctiva and sclera clear  NECK: Supple, No JVD. On [facility] vent with a tracheostomy  CHEST/LUNG: Clear to auscultation bilaterally; No wheeze  HEART: Regular rate and rhythm; No murmurs, rubs, or gallops  ABDOMEN: Soft, Nontender, Nondistended; Bowel sounds present  EXTREMITIES:  2+ Peripheral Pulses, No clubbing, cyanosis, or edema  PSYCH: AAOx3  NEUROLOGY: non-focal. Functionally quadriplegic  SKIN: Sacral decubitus dressed    LABS:                                 9.5    8.05  )-----------( 130      ( 28 Nov 2023 06:36 )             32.2                7.5    7.48  )-----------( 134      ( 26 Nov 2023 07:27 )             25.6                                    8.6    8.89  )-----------( 126      ( 21 Nov 2023 07:05 )             29.1                8.9    9.19  )-----------( 124      ( 18 Nov 2023 09:15 )             30.4     11-18    137  |  99  |  29<H>  ----------------------------<  224<H>  4.1   |  28  |  <0.30<L>    Ca    10.0      18 Nov 2023 09:15  Mg     1.8     11-18    TPro  7.4  /  Alb  3.3  /  TBili  0.3  /  DBili  x   /  AST  19  /  ALT  18  /  AlkPhos  48  11-18          Urinalysis Basic - ( 18 Nov 2023 09:15 )    Color: x / Appearance: x / SG: x / pH: x  Gluc: 224 mg/dL / Ketone: x  / Bili: x / Urobili: x   Blood: x / Protein: x / Nitrite: x   Leuk Esterase: x / RBC: x / WBC x   Sq Epi: x / Non Sq Epi: x / Bacteria: x        RADIOLOGY & ADDITIONAL TESTS:    Imaging Personally Reviewed:    Consultant(s) Notes Reviewed:      Care Discussed with Consultants/Other Providers:

## 2023-11-29 NOTE — PROGRESS NOTE ADULT - PROBLEM SELECTOR PLAN 4
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 Multifactorial, possible the PEG has been too tight at home.  - Peg loosened by GI at beside, no leakage or further intervention required   -- recurrent RLQ abdominal pain and bleeding at the PEG site  -- reevaluated by GI -- no bleeding at the PEG site  - no BM since 11/19 -- increased dose of Senna and increased Miralax to BID   - 11/28 frequent multiple BMs, senna and miralax d/c'd - GI evaluated  - Continue Simethicone, cont monitoring 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   - Bowel regimen  - PEG Site appears macerated. Multifactorial, possible the PEG has been too tight at home.  - Peg loosened by GI at beside, no leakage or further intervention required   - recurrent RLQ abdominal pain and bleeding at the PEG site  - reevaluated by GI -- no bleeding at the PEG site  - no BM since 11/19 -- increased dose of Senna and increased Miralax to BID   - 11/28 frequent multiple BMs, senna and miralax d/c'd - GI evaluated  - Continue Simethicone, cont monitoring

## 2023-11-29 NOTE — PROGRESS NOTE ADULT - PROBLEM SELECTOR PLAN 1
Found w/ Bilateral PNA vs Atelectasis, and Possible OM Sacrum   S/p Fevers at home, Afebrile since admission __ remains afebrile, no leucocytosis,   - S/p Chest CT (10/28):  c/w Bilateral PNA vs Atelectasis   - s/p Empirically on Vanco, Aztreonam   - Blood cx: 11/12 -- neg   - urine culture: negative   - Sputum cx + Pseudomonas aeruginosa, S. aureus  - Sputum Cx 11/6: + PSA and Serratia, Cefepime 11/8 -->11/15  - 11/22 & 11/26 blood cultures NGTD  - 11/26 sputum culture MDR pseudomonas - clinically stable, defer treatment unless decompensates as per ID  - 11/26 urine culture - enterococcus - no need to treat - cfu low, organism not significant as per ID  -- see tx of sacral OM below Found w/ Bilateral PNA vs Atelectasis, and Possible OM Sacrum   - S/p Chest CT (10/28):  c/w Bilateral PNA vs Atelectasis   - s/p Vanco, Aztreonam   - Blood cx: 11/12 -- neg   - urine culture: negative   - Sputum cx + Pseudomonas aeruginosa, S. aureus  - Sputum Cx 11/6: + PSA and Serratia, Cefepime 11/8 -->11/15  - 11/22 & 11/26 blood cultures NGTD  - 11/26 sputum culture MDR pseudomonas - clinically stable, defer treatment unless decompensates as per ID  - 11/26 urine culture - enterococcus - no need to treat - cfu low, organism not significant as per ID  -- see tx of sacral OM below

## 2023-11-29 NOTE — PROGRESS NOTE ADULT - NS ATTEND AMEND GEN_ALL_CORE FT
73F with a h/o DM, HTN, HLD, anemia, hypothyroidism, RA, fibromyalgia, remote cerebral aneurysm with repair, hypercapnic respiratory failure s/p tracheostomy/PEG, bedbound, sacral pressure ulcer. Admitted w/ bleeding from tracheostomy and PEG w/ associated abdominal pain, recent hx of auditory/visual hallucinations. Since admission, no further bleeding noted from either trach or PEG. Imaging showed mild thickening along PEG tube tract and sacral ulcer extending to coccyx suggestive of OM.        # Osteomyelitis  # Chronic hypoxemic RF  # oropharyngeal dysphagia  # acute on chronic pain  # Trach/Peg bleeding  # Tracheitis with Pseudomonas and serratia  # Hx of hallucination, anxiety - particularly in setting of prior antibiotics    #Neuro - awake, alert, and interactive. moving all extremities  - Patient presently calm and following commands appropriately. Continue Seroquel  - Behavioral Health Follow-up as needed  #Cardiovascular - hemodynamically stable  #Pulmonary - acute on chronic hypoxemic respiratory failure - on home vent after extensive hospitalizations over the prior year since being trached last December - PSV as able  - ENT follow-up as needed - previously changed to 8XLT trach - daughter was upset about previous trach changes but presently patient ventilating in stable fashion on current settings  - Maintain O2 sat > 90%  - Daughter requesting home CO2 monitoring device as unable have ABGs done at home as patient has a history of CO2 increase off ventilator  - Continue hypersal for now  #Gastro - oropharyngeal dysphagia with PEG tube in place  - No further diarrhea/loose BM today since stool softeners stopped  - GI evaluation and AXR noted - no signs of obstruction  - History of c. diff previously treated per daughter - if diarrhea recurs off stool softeners will need to repeat testing for this  #Infectious Disease - sacral osteo based on MRI - will need wound care follow-up to ensure no debridement required (per daughter had debridement in past at another hospitalization)  - Continue Cipro and Keflex ass per ID - with plan for 6 week course in total. Will continue Cipro in D5 despite hyperglycemia given improved absorption compared to PO per d/w ID and pharmacy  - Per daughter, patient has had prior multiple infections during hospitalizations including prior pseudomonas, MRSA, E faecalis, and Klebsiella  - Sputum now with MDR Pseudomonas - ID follow-up however would consider holding off targeting this as respiratory status presently stable and no increase in secretions  - Follow-up repeat cultures which are in lab - blood cultures thus far negative.  - Daughter reports a recent dental abscess and being told by outpatient dentist that patient needed teeth extracted - Dental evaluation noted with no plans for intervention as inpatient.  #Hem/Onc - prior cytopenias in setting of antibiotics per patient's daughter -   - Required PRBC transfusion over the weekend for Hb < 8 but no clear signs of bleeding  - Hem/Onc follow-up noted - suspect an anemia of chronic disease  - Daughter told by a prior Hematologist that her Hb must remain above 8 - though unclear whether this was in relation to any particular finding or just clinical gestalt  #Pain - continue current pain control - in setting of fibromyalgia and pain from wound  #Derm - previously seen by derm for skin lesions - mid back with irritated seborrheic keratosis - outpatient follow-up  #Endo - DM2 - continue insulin and adjust as needed for goal FS < 180  - Endocrine follow-up for assistance with hyperglycemia management  - Continue Synthroid and repeat TSH  #DVT proph - SCD  - Prior prophylactic AC stopped after concern for prior bleeding    Prognosis guarded    I spoke with patient's daughter over the phone this AM during rounds for about 20 minutes. All questions answered. Will continue to work with CM team about discharge planning though at this time there are no accepting [facility] which seems to be a big barrier. IV antibiotics are going to be needed til about 12/10 per ID 73F with a h/o DM, HTN, HLD, anemia, hypothyroidism, RA, fibromyalgia, remote cerebral aneurysm with repair, hypercapnic respiratory failure s/p tracheostomy/PEG, bedbound, sacral pressure ulcer. Admitted w/ bleeding from tracheostomy and PEG w/ associated abdominal pain, recent hx of auditory/visual hallucinations. Since admission, no further bleeding noted from either trach or PEG. Imaging showed mild thickening along PEG tube tract and sacral ulcer extending to coccyx suggestive of OM.       # Osteomyelitis  # Chronic hypoxemic RF  # oropharyngeal dysphagia  # acute on chronic pain  # Trach/Peg bleeding  # Tracheitis with Pseudomonas and serratia  # Hx of hallucination, anxiety - particularly in setting of prior antibiotics    #Neuro - awake, alert, and interactive. moving all extremities  - Patient presently calm and following commands appropriately. Continue Seroquel  - Behavioral Health Follow-up as needed - was reportedly agitated/anxious and did not sleep well last night  #Cardiovascular - hemodynamically stable  #Pulmonary - acute on chronic hypoxemic respiratory failure - on home vent after extensive hospitalizations over the prior year since being trached last December - PSV as able (did 15/5 yesterday and 12/5 day prior)  - ENT follow-up as needed - previously changed to 8XLT trach - daughter was upset about previous trach changes but presently patient ventilating in stable fashion on current settings  - Maintain O2 sat > 90%  - Daughter requesting home CO2 monitoring device as unable have ABGs done at home as patient has a history of CO2 increase off ventilator  - Continue hypersal for now  #Gastro - oropharyngeal dysphagia with PEG tube in place  - No further diarrhea/loose BM today since stool softeners stopped  - GI evaluation and AXR noted - no signs of obstruction  - History of c. diff previously treated per daughter - if diarrhea recurs off stool softeners will need to repeat testing for this. Daughter to send prior records of c. diff  #Infectious Disease - sacral osteo based on MRI - will need wound care follow-up to ensure no debridement required (per daughter had debridement in past at another hospitalization)  - Continue Cipro and Keflex ass per ID - with plan for 6 week course in total. Will continue Cipro in D5 despite hyperglycemia given improved absorption compared to PO per d/w ID and pharmacy  - Per daughter, patient has had prior multiple infections during hospitalizations including prior pseudomonas, MRSA, E faecalis, and Klebsiella  - Sputum now with MDR Pseudomonas - ID follow-up however would consider holding off targeting this as respiratory status presently stable and no increase in secretions  - Follow-up repeat cultures which are in lab - blood cultures thus far negative. Urine culture not likely a true infection per d/w ID  - Daughter reports a recent dental abscess and being told by outpatient dentist that patient needed teeth extracted - Dental evaluation noted with no plans for intervention as inpatient. Daughter concerned for this as a possible cause of leukocytosis though clinically patient unchanged and afebrile  - Repeat CBC in AM to trend WBC which is increased todsay  #Hem/Onc - prior cytopenias in setting of antibiotics per patient's daughter   - Required PRBC transfusion over the weekend for Hb < 8 but no clear signs of bleeding  - Hem/Onc follow-up noted - suspect an anemia of chronic disease  - Daughter told by a prior Hematologist that her Hb must remain above 8 - though unclear whether this was in relation to any particular finding or just clinical gestalt  #Pain - continue current pain control - in setting of fibromyalgia and pain from wound  #Derm - previously seen by derm for skin lesions - mid back with irritated seborrheic keratosis - outpatient follow-up  #Endo - DM2 - continue insulin and adjust as needed for goal FS < 180  - Endocrine follow-up for assistance with hyperglycemia management  - Continue Synthroid - TSH WNL  #DVT proph - SCD  - Prior prophylactic AC stopped after concern for prior bleeding    Prognosis guarded    I spoke with patient's daughter over the phone this AM during rounds for about 15 minutes. All questions answered. Will continue to work with CM team about discharge planning though at this time there are no accepting [facility] which seems to be a big barrier. IV antibiotics are going to be needed til about 12/10 per ID. Will need to monitor leukocytosis. Discussed with daughter also that while she has told us that an agency ([facility]) has told her that they have available home 24 hr LPN the insurance company has told our CM team that this agency has not responded to them and the insurance company is unable to verify this. Additionally, [facility] team has notified us that they are unable to accept patient for outpatient care due to her level of needs. On discussion with daughter, she is frustrated by the notation of "chronic" wound and relates that the sacral wound is the only wound which patient has and has only been there since hospitalization in December. The wound appears to have been initially around 4.5x2.5x1.5 and is now 4.5x2.5x0.5 if I am reading the wound notes correctly. Wound care team has told us that the wound presently does not require change in present management but we will follow-up with them regarding this.

## 2023-11-29 NOTE — PROGRESS NOTE ADULT - PROBLEM SELECTOR PLAN 6
-Continue home Prednisone regimen 5 mg daily   *fibromyalgia  -continue home Gabapentin 100mg daily  -continue home Fentanyl 25mcg Q72H patches  - c/w Lidocaine patch   - Oxycodone 2.5mg q6 hrs PRN (in addition to Tylenol PRN) - Continue home Prednisone regimen 5 mg daily   *fibromyalgia  - continue home Gabapentin 100mg daily  - continue home Fentanyl 25mcg Q72H patches  - c/w Lidocaine patch   - Oxycodone 2.5mg q6 hrs PRN (in addition to Tylenol PRN)

## 2023-11-29 NOTE — PROGRESS NOTE ADULT - SUBJECTIVE AND OBJECTIVE BOX
Patient is a 73y old  Female who presents with a chief complaint of Bleeding from tracheostomy and PEG tube (21 Nov 2023 08:15)      Interval Events:    REVIEW OF SYSTEMS:  [ ] Positive  [ ] All other systems negative  [ ] Unable to assess ROS because ________    Vital Signs Last 24 Hrs  T(C): 36.5 (11-29-23 @ 04:46), Max: 36.9 (11-28-23 @ 20:52)  T(F): 97.7 (11-29-23 @ 04:46), Max: 98.5 (11-28-23 @ 20:52)  HR: 83 (11-29-23 @ 06:28) (76 - 108)  BP: 136/64 (11-29-23 @ 04:46) (110/57 - 136/64)  RR: 15 (11-29-23 @ 04:46) (15 - 21)  SpO2: 100% (11-29-23 @ 06:28) (98% - 100%)    PHYSICAL EXAM:  HEENT:   [ ]Tracheostomy:  [ ]Pupils equal  [ ]No oral lesions  [ ]Abnormal    SKIN  [ ]No Rash  [ ] Abnormal  [ ] pressure    CARDIAC  [ ]Regular  [ ]Abnormal    PULMONARY  [ ]Bilateral Clear Breath Sounds  [ ]Normal Excursion  [ ]Abnormal    GI  [ ]PEG      [ ] +BS		              [ ]Soft, nondistended, nontender	  [ ]Abnormal    MUSCULOSKELETAL                                   [ ]Bedbound                 [ ]Abnormal    [ ]Ambulatory/OOB to chair                           EXTREMITIES                                         [ ]Normal  [ ]Edema                           NEUROLOGIC  [ ] Normal, non focal  [ ] Focal findings:    PSYCHIATRIC  [ ]Alert and appropriate  [ ] Sedated	 [ ]Agitated    :  Reed: [ ] Yes, if yes: Date of Placement:                   [  ] No    LINES: Central Lines [ ] Yes, if yes: Date of Placement                                     [  ] No    HOSPITAL MEDICATIONS:  MEDICATIONS  (STANDING):  albuterol/ipratropium for Nebulization 3 milliLiter(s) Nebulizer every 6 hours  amLODIPine   Tablet 10 milliGRAM(s) Oral daily  artificial  tears Solution 2 Drop(s) Both EYES four times a day  ascorbic acid 500 milliGRAM(s) Oral daily  calcium carbonate    500 mG (Tums) Chewable 1 Tablet(s) Chew every 12 hours  cephalexin 500 milliGRAM(s) Oral every 6 hours  chlorhexidine 0.12% Liquid 15 milliLiter(s) Oral Mucosa every 12 hours  chlorhexidine 4% Liquid 1 Application(s) Topical <User Schedule>  ciprofloxacin   IVPB 400 milliGRAM(s) IV Intermittent every 12 hours  dextrose 50% Injectable 12.5 Gram(s) IV Push once  dextrose 50% Injectable 25 Gram(s) IV Push once  dextrose 50% Injectable 50 milliLiter(s) IV Push once  dextrose 50% Injectable 25 Gram(s) IV Push once  dextrose Oral Gel 15 Gram(s) Oral once  escitalopram 10 milliGRAM(s) Oral <User Schedule>  gabapentin Solution 100 milliGRAM(s) Enteral Tube at bedtime  glucagon  Injectable 1 milliGRAM(s) IntraMuscular once  hemorrhoidal Ointment      hemorrhoidal Ointment 1 Application(s) Rectal daily  insulin glargine Injectable (LANTUS) 20 Unit(s) SubCutaneous every morning  insulin lispro (ADMELOG) corrective regimen sliding scale   SubCutaneous every 6 hours  insulin regular  human recombinant 17 Unit(s) SubCutaneous <User Schedule>  levothyroxine 125 MICROGram(s) Oral <User Schedule>  lidocaine   4% Patch 1 Patch Transdermal daily  melatonin 3 milliGRAM(s) Oral at bedtime  multivitamin/minerals/iron Oral Solution (CENTRUM) 15 milliLiter(s) Oral daily  nystatin Powder 1 Application(s) Topical every 8 hours  oxybutynin 5 milliGRAM(s) Oral daily  pantoprazole   Suspension 40 milliGRAM(s) Enteral Tube <User Schedule>  petrolatum Ophthalmic Ointment 1 Application(s) Both EYES four times a day  predniSONE   Tablet 5 milliGRAM(s) Oral daily  QUEtiapine 12.5 milliGRAM(s) Oral <User Schedule>  simethicone 80 milliGRAM(s) Chew four times a day  sodium chloride 3%  Inhalation 4 milliLiter(s) Inhalation every 12 hours  triamcinolone 0.1% Ointment 1 Application(s) Topical two times a day  zinc oxide 40% Paste 1 Application(s) Topical daily    MEDICATIONS  (PRN):  acetaminophen   Oral Liquid .. 1000 milliGRAM(s) Enteral Tube every 6 hours PRN Temp greater or equal to 38C (100.4F), Mild Pain (1 - 3)  benzocaine 20% Spray 1 Spray(s) Topical four times a day PRN Cough  diphenhydrAMINE Elixir 25 milliGRAM(s) Oral every 6 hours PRN Rash and/or Itching  guaifenesin/dextromethorphan Oral Liquid 10 milliLiter(s) Oral every 6 hours PRN Cough  sodium chloride 0.65% Nasal 1 Spray(s) Both Nostrils every 6 hours PRN Nasal Congestion      LABS:                        10.4   14.21 )-----------( 125      ( 29 Nov 2023 06:38 )             34.6     11-29    137  |  99  |  17  ----------------------------<  147<H>  4.1   |  28  |  <0.30<L>    Ca    9.3      29 Nov 2023 06:38  Phos  3.8     11-29  Mg     2.1     11-29    TPro  7.5  /  Alb  3.4  /  TBili  0.4  /  DBili  x   /  AST  25  /  ALT  23  /  AlkPhos  52  11-29      Urinalysis Basic - ( 29 Nov 2023 06:38 )    Color: x / Appearance: x / SG: x / pH: x  Gluc: 147 mg/dL / Ketone: x  / Bili: x / Urobili: x   Blood: x / Protein: x / Nitrite: x   Leuk Esterase: x / RBC: x / WBC x   Sq Epi: x / Non Sq Epi: x / Bacteria: x          CAPILLARY BLOOD GLUCOSE    MICROBIOLOGY:     RADIOLOGY:  [ ] Reviewed and interpreted by me    Mode: AC/ CMV (Assist Control/ Continuous Mandatory Ventilation)  RR (machine): 12  TV (machine): 300  FiO2: 30  PEEP: 5  ITime: 1  MAP: 9  PIP: 30   Patient is a 73y old  Female who presents with a chief complaint of Bleeding from tracheostomy and PEG tube (21 Nov 2023 08:15)      Interval Events:  No acute events overnight.     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    CARDIAC  [X]Regular  [ ]Abnormal    PULMONARY  [ ]Bilateral Clear Breath Sounds  [ ]Normal Excursion  [X]Abnormal - BL Coarse BS    GI  [X]PEG      [X] +BS		              [X]Soft, nondistended, nontender	  [ ]Abnormal    MUSCULOSKELETAL                                   [X]Bedbound                 [ ]Abnormal    [ ]Ambulatory/OOB to chair                           EXTREMITIES                                         [X]Normal  [ ]Edema                           NEUROLOGIC  [ ] Normal, non focal  [X] Focal findings: opens eyes spontaneously, follows commands on all 4 extremities    PSYCHIATRIC  [X]Alert and appropriate  [ ] Sedated	 [ ]Agitated    :  Mcbride: [ ] Yes, if yes: Date of Placement:                   [X] No    LINES: Central Lines [ ] Yes, if yes: Date of Placement                                     [X] No    HOSPITAL MEDICATIONS:  MEDICATIONS  (STANDING):  albuterol/ipratropium for Nebulization 3 milliLiter(s) Nebulizer every 6 hours  amLODIPine   Tablet 10 milliGRAM(s) Oral daily  artificial  tears Solution 2 Drop(s) Both EYES four times a day  ascorbic acid 500 milliGRAM(s) Oral daily  calcium carbonate    500 mG (Tums) Chewable 1 Tablet(s) Chew every 12 hours  cephalexin 500 milliGRAM(s) Oral every 6 hours  chlorhexidine 0.12% Liquid 15 milliLiter(s) Oral Mucosa every 12 hours  chlorhexidine 4% Liquid 1 Application(s) Topical <User Schedule>  ciprofloxacin   IVPB 400 milliGRAM(s) IV Intermittent every 12 hours  dextrose 50% Injectable 12.5 Gram(s) IV Push once  dextrose 50% Injectable 25 Gram(s) IV Push once  dextrose 50% Injectable 50 milliLiter(s) IV Push once  dextrose 50% Injectable 25 Gram(s) IV Push once  dextrose Oral Gel 15 Gram(s) Oral once  escitalopram 10 milliGRAM(s) Oral <User Schedule>  gabapentin Solution 100 milliGRAM(s) Enteral Tube at bedtime  glucagon  Injectable 1 milliGRAM(s) IntraMuscular once  hemorrhoidal Ointment      hemorrhoidal Ointment 1 Application(s) Rectal daily  insulin glargine Injectable (LANTUS) 20 Unit(s) SubCutaneous every morning  insulin lispro (ADMELOG) corrective regimen sliding scale   SubCutaneous every 6 hours  insulin regular  human recombinant 17 Unit(s) SubCutaneous <User Schedule>  levothyroxine 125 MICROGram(s) Oral <User Schedule>  lidocaine   4% Patch 1 Patch Transdermal daily  melatonin 3 milliGRAM(s) Oral at bedtime  multivitamin/minerals/iron Oral Solution (CENTRUM) 15 milliLiter(s) Oral daily  nystatin Powder 1 Application(s) Topical every 8 hours  oxybutynin 5 milliGRAM(s) Oral daily  pantoprazole   Suspension 40 milliGRAM(s) Enteral Tube <User Schedule>  petrolatum Ophthalmic Ointment 1 Application(s) Both EYES four times a day  predniSONE   Tablet 5 milliGRAM(s) Oral daily  QUEtiapine 12.5 milliGRAM(s) Oral <User Schedule>  simethicone 80 milliGRAM(s) Chew four times a day  sodium chloride 3%  Inhalation 4 milliLiter(s) Inhalation every 12 hours  triamcinolone 0.1% Ointment 1 Application(s) Topical two times a day  zinc oxide 40% Paste 1 Application(s) Topical daily    MEDICATIONS  (PRN):  acetaminophen   Oral Liquid .. 1000 milliGRAM(s) Enteral Tube every 6 hours PRN Temp greater or equal to 38C (100.4F), Mild Pain (1 - 3)  benzocaine 20% Spray 1 Spray(s) Topical four times a day PRN Cough  diphenhydrAMINE Elixir 25 milliGRAM(s) Oral every 6 hours PRN Rash and/or Itching  guaifenesin/dextromethorphan Oral Liquid 10 milliLiter(s) Oral every 6 hours PRN Cough  sodium chloride 0.65% Nasal 1 Spray(s) Both Nostrils every 6 hours PRN Nasal Congestion      LABS:                        10.4   14.21 )-----------( 125      ( 29 Nov 2023 06:38 )             34.6     11-29    137  |  99  |  17  ----------------------------<  147<H>  4.1   |  28  |  <0.30<L>    Ca    9.3      29 Nov 2023 06:38  Phos  3.8     11-29  Mg     2.1     11-29    TPro  7.5  /  Alb  3.4  /  TBili  0.4  /  DBili  x   /  AST  25  /  ALT  23  /  AlkPhos  52  11-29      Urinalysis Basic - ( 29 Nov 2023 06:38 )    Color: x / Appearance: x / SG: x / pH: x  Gluc: 147 mg/dL / Ketone: x  / Bili: x / Urobili: x   Blood: x / Protein: x / Nitrite: x   Leuk Esterase: x / RBC: x / WBC x   Sq Epi: x / Non Sq Epi: x / Bacteria: x          CAPILLARY BLOOD GLUCOSE    MICROBIOLOGY:     RADIOLOGY:  [ ] Reviewed and interpreted by me    Mode: AC/ CMV (Assist Control/ Continuous Mandatory Ventilation)  RR (machine): 12  TV (machine): 300  FiO2: 30  PEEP: 5  ITime: 1  MAP: 9  PIP: 30

## 2023-11-29 NOTE — CHART NOTE - NSCHARTNOTEFT_GEN_A_CORE
Nutrition Follow Up Note  Patient seen for: follow up and consult for tube feeding    Source: [] Patient       [x] Medical Record        [x] Nursing        [] Family   [x] Other: PA    -If unable to interview patient: [x] Trach/Vent/BiPAP  [] Disoriented/confused/inappropriate to interview    Diet, NPO with Tube Feed:   Tube Feeding Modality: Gastrostomy  [employer]ia RFID glucose support 1.2  Total Volume for 24 Hours (mL): 1176  Continuous  Starting Tube Feed Rate {mL per Hour}: 56  Increase Tube Feed Rate by (mL): 0  Until Goal Tube Feed Rate (mL per Hour): 56  Tube Feed Duration (in Hours): 21  Tube Feed Start Time: 06:00  Tube Feed Stop Time: 03:00  Free Water Flush  Pump   Rate (mL per Hour): 10   Frequency: Every 2 Hours  Alfred(7 Gm Arginine/7 Gm Glut/1.2 Gm HMB     Qty per Day:  2 (23 @ 17:37) [Active]    EN order providing if 100% provision: 1176ml total volume, 1411kcal (29kcal/kg), 75g protein (1.56g/kg) and 889ml free water     - Is current order appropriate/adequate? see below for recommendations    Nutrition-related concerns:  -free water flush ordered, see above.   -pt vegan, but per previous discussion with daughter, Alfred is okay. See nutrition note .   -Hgb A1c 7.5%. POCT Blood glucose levels being monitored. on insulin regimen to maintain glycemic control.   -PO synthroid ordered.   -Pt receives Apprenda peptide 1.5 at home.    Last BM  per flow sheets.  Bowel Regimen? [x] Yes   [] No    Weights:   Weights:   Dosing weight 54.6kg  10/29:  reports pt UBW 90 pounds prior to illness (~41kg).   Daily Weight in k.4 (), Daily Weight in k.3 (), Daily Weight in k.9 (), Daily Weight in k.9 ()  Pt with some weight gain since admission. noted with edema (see below).   RD will continue to monitor trends.   IBW + 10% 48kg    MEDICATIONS  (STANDING):  amLODIPine   Tablet  ascorbic acid  calcium carbonate    500 mG (Tums) Chewable  cephalexin  ciprofloxacin   IVPB  dextrose 50% Injectable  dextrose 50% Injectable  dextrose 50% Injectable  dextrose 50% Injectable  dextrose Oral Gel  glucagon  Injectable  insulin glargine Injectable (LANTUS)  insulin lispro (ADMELOG) corrective regimen sliding scale  insulin regular  human recombinant  levothyroxine  multivitamin/minerals/iron Oral Solution (CENTRUM)  pantoprazole   Suspension  predniSONE   Tablet  simethicone    Pertinent Labs:  @ 06:38: Na 137, BUN 17, Cr <0.30<L>, <H>, K+ 4.1, Phos 3.8, Mg 2.1, Alk Phos 52, ALT/SGPT 23, AST/SGOT 25, HbA1c --   @ 14:03: Na 136, BUN 19, Cr <0.30<L>, <H>, K+ 4.7, Phos --, Mg 1.8, Alk Phos --, ALT/SGPT --, AST/SGOT --, HbA1c --    A1C with Estimated Average Glucose Result: 7.5 % (10-28-23 @ 07:18)    Finger Sticks:  POCT Blood Glucose.: 206 mg/dL ( @ 12:40)  POCT Blood Glucose.: 138 mg/dL ( @ 06:04)  POCT Blood Glucose.: 94 mg/dL ( @ 00:02)  POCT Blood Glucose.: 187 mg/dL ( @ 17:29)  POCT Blood Glucose.: 292 mg/dL ( @ 13:04)    Triglycerides, Serum: 192 mg/dL (23 @ 07:05)    Skin per nursing documentation: sacrum stage IV per flow sheets.   Edema per nursing documentation:  1+ right arm    Estimated Needs using IBW + 10% considering increased needs and edema   27-32kcal/kg 1296-1536kcal/day  1.3-1.6g/kg 62-77g protein/day  defer fluid needs to team    Previous Nutrition Diagnosis:  Increased Nutrient Needs  Nutrition Diagnosis is: [x] ongoing  [] resolved [] not applicable     Nutrition Care Plan:  [x] In Progress  [] Achieved  [] Not applicable    New Nutrition Diagnosis: [x] Not applicable    Nutrition Interventions:  : Spoke with pt daughter over phone. Explained current tube feed formula, importance and reason for change. Explained calories and protein provided by tube feeds. pt weight discussed, current weight. Discussed Alfred, vitamin C and Multivitamin importance/amount. Zinc 220mg discontinued, explained reasoning why (interaction with copper). All questions/concerns addressed.     Recommendations:      -Can continue [employer] RFID Glucose Support. Update dose: 65ml/hr x 18 hours to provide a total volume of 1170ml, 1404kcal (29kcal/kg), 75g protein (1.56g/kg). Update tube feed time: start 06:00 and run for 18 hours until midnight. 18 hour feed will allow time for synthroid administration, noted to be ordered to be given at 05:00.   -Alfred will provide additional glutamine and arginine amino acids with collagen for wound healing. continue.   -Continue Vitamin C and Multivitamin to aid in wound healing.   -Defer free water flush to team.     Monitoring and Evaluation:   Continue to monitor nutritional intake, tolerance to diet prescription, weights, labs, skin integrity    RD remains available upon request and will follow up per protocol  Rufina Morris, MS, RD, CDN / Teams Nutrition Follow Up Note  Patient seen for: follow up and consult for tube feeding    Source: [] Patient       [x] Medical Record        [x] Nursing        [] Family   [x] Other: PA    -If unable to interview patient: [x] Trach/Vent/BiPAP  [] Disoriented/confused/inappropriate to interview    Diet, NPO with Tube Feed:   Tube Feeding Modality: Gastrostomy  Sqoot glucose support 1.2  Total Volume for 24 Hours (mL): 1176  Continuous  Starting Tube Feed Rate {mL per Hour}: 56  Increase Tube Feed Rate by (mL): 0  Until Goal Tube Feed Rate (mL per Hour): 56  Tube Feed Duration (in Hours): 21  Tube Feed Start Time: 06:00  Tube Feed Stop Time: 03:00  Free Water Flush  Pump   Rate (mL per Hour): 10   Frequency: Every 2 Hours  Alfred(7 Gm Arginine/7 Gm Glut/1.2 Gm HMB     Qty per Day:  2 (23 @ 17:37) [Active]    EN order providing if 100% provision: 1176ml total volume, 1411kcal (29kcal/kg), 75g protein (1.56g/kg) and 889ml free water     - Is current order appropriate/adequate? see below for recommendations    Nutrition-related concerns:  -free water flush ordered, see above.   -pt vegan, but per previous discussion with daughter, Alfred is okay. See nutrition note .   -Hgb A1c 7.5%. POCT Blood glucose levels being monitored. on insulin regimen to maintain glycemic control. endocrinology following.   -PO synthroid ordered.   -Pt receives ERUCES peptide 1.5 at home.    Last BM  per flow sheets.  Bowel Regimen? [x] Yes   [] No    Weights:   Weights:   Dosing weight 54.6kg  10/29:  reports pt UBW 90 pounds prior to illness (~41kg).   Daily Weight in k.4 (), Daily Weight in k.3 (), Daily Weight in k.9 (), Daily Weight in k.9 ()  Pt with some weight gain since admission. noted with edema (see below).   RD will continue to monitor trends.   IBW + 10% 48kg    MEDICATIONS  (STANDING):  amLODIPine   Tablet  ascorbic acid  calcium carbonate    500 mG (Tums) Chewable  cephalexin  ciprofloxacin   IVPB  dextrose 50% Injectable  dextrose 50% Injectable  dextrose 50% Injectable  dextrose 50% Injectable  dextrose Oral Gel  glucagon  Injectable  insulin glargine Injectable (LANTUS)  insulin lispro (ADMELOG) corrective regimen sliding scale  insulin regular  human recombinant  levothyroxine  multivitamin/minerals/iron Oral Solution (CENTRUM)  pantoprazole   Suspension  predniSONE   Tablet  simethicone    Pertinent Labs:  @ 06:38: Na 137, BUN 17, Cr <0.30<L>, <H>, K+ 4.1, Phos 3.8, Mg 2.1, Alk Phos 52, ALT/SGPT 23, AST/SGOT 25, HbA1c --   @ 14:03: Na 136, BUN 19, Cr <0.30<L>, <H>, K+ 4.7, Phos --, Mg 1.8, Alk Phos --, ALT/SGPT --, AST/SGOT --, HbA1c --    A1C with Estimated Average Glucose Result: 7.5 % (10-28-23 @ 07:18)    Finger Sticks:  POCT Blood Glucose.: 206 mg/dL ( @ 12:40)  POCT Blood Glucose.: 138 mg/dL ( @ 06:04)  POCT Blood Glucose.: 94 mg/dL ( @ 00:02)  POCT Blood Glucose.: 187 mg/dL ( @ 17:29)  POCT Blood Glucose.: 292 mg/dL ( @ 13:04)    Triglycerides, Serum: 192 mg/dL (23 @ 07:05)    Skin per nursing documentation: sacrum stage IV per flow sheets.   Edema per nursing documentation:  1+ right arm    Estimated Needs using IBW + 10% considering increased needs and edema   27-32kcal/kg 1296-1536kcal/day  1.3-1.6g/kg 62-77g protein/day  defer fluid needs to team    Previous Nutrition Diagnosis:  Increased Nutrient Needs  Nutrition Diagnosis is: [x] ongoing  [] resolved [] not applicable     Nutrition Care Plan:  [x] In Progress  [] Achieved  [] Not applicable    New Nutrition Diagnosis: [x] Not applicable    Nutrition Interventions:  : Spoke with pt daughter over phone. Explained current tube feed formula, importance and reason for change. Explained calories and protein provided by tube feeds. pt weight discussed, current weight. Discussed Alfred, vitamin C and Multivitamin importance/amount. Zinc 220mg discontinued, explained reasoning why (interaction with copper). All questions/concerns addressed.     Recommendations:      -Can continue Sqoot Glucose Support. Update dose: 65ml/hr x 18 hours to provide a total volume of 1170ml, 1404kcal (29kcal/kg), 75g protein (1.56g/kg). Update tube feed time: start 06:00 and run for 18 hours until midnight. 18 hour feed will allow time for synthroid administration, noted to be ordered to be given at 05:00.   -Alfred will provide additional glutamine and arginine amino acids with collagen for wound healing. continue.   -Continue Vitamin C and Multivitamin to aid in wound healing.   -Defer free water flush to team.     Monitoring and Evaluation:   Continue to monitor nutritional intake, tolerance to diet prescription, weights, labs, skin integrity    RD remains available upon request and will follow up per protocol  Rufina Morris, MS, RD, CDN / Teams Nutrition Follow Up Note  Patient seen for: follow up and consult for tube feeding    Source: [] Patient       [x] Medical Record        [x] Nursing        [] Family   [x] Other: PA    -If unable to interview patient: [x] Trach/Vent/BiPAP  [] Disoriented/confused/inappropriate to interview    Diet, NPO with Tube Feed:   Tube Feeding Modality: Gastrostomy  VtagO glucose support 1.2  Total Volume for 24 Hours (mL): 1176  Continuous  Starting Tube Feed Rate {mL per Hour}: 56  Increase Tube Feed Rate by (mL): 0  Until Goal Tube Feed Rate (mL per Hour): 56  Tube Feed Duration (in Hours): 21  Tube Feed Start Time: 06:00  Tube Feed Stop Time: 03:00  Free Water Flush  Pump   Rate (mL per Hour): 10   Frequency: Every 2 Hours  Alfred(7 Gm Arginine/7 Gm Glut/1.2 Gm HMB     Qty per Day:  2 (23 @ 17:37) [Active]    EN order providing if 100% provision: 1176ml total volume, 1411kcal (29kcal/kg), 75g protein (1.56g/kg) and 889ml free water     - Is current order appropriate/adequate? see below for recommendations    Nutrition-related concerns:  -free water flush ordered, see above.   -pt vegan, but per previous discussion with daughter, Alfred is okay. See nutrition note .   -Hgb A1c 7.5%. POCT Blood glucose levels being monitored. on insulin regimen to maintain glycemic control. endocrinology following.   -PO synthroid ordered.   -Pt receives PlaySpan peptide 1.5 at home.    Last BM  per flow sheets.  Bowel Regimen? [x] Yes   [] No    Weights:   Weights:   Dosing weight 54.6kg  10/29:  reports pt UBW 90 pounds prior to illness (~41kg).   Daily Weight in k.4 (), Daily Weight in k.3 (), Daily Weight in k.9 (), Daily Weight in k.9 ()  Pt with some weight gain since admission. noted with edema (see below).   RD will continue to monitor trends.   IBW + 10% 48kg    MEDICATIONS  (STANDING):  amLODIPine   Tablet  ascorbic acid  calcium carbonate    500 mG (Tums) Chewable  cephalexin  ciprofloxacin   IVPB  dextrose 50% Injectable  dextrose 50% Injectable  dextrose 50% Injectable  dextrose 50% Injectable  dextrose Oral Gel  glucagon  Injectable  insulin glargine Injectable (LANTUS)  insulin lispro (ADMELOG) corrective regimen sliding scale  insulin regular  human recombinant  levothyroxine  multivitamin/minerals/iron Oral Solution (CENTRUM)  pantoprazole   Suspension  predniSONE   Tablet  simethicone    Pertinent Labs:  @ 06:38: Na 137, BUN 17, Cr <0.30<L>, <H>, K+ 4.1, Phos 3.8, Mg 2.1, Alk Phos 52, ALT/SGPT 23, AST/SGOT 25, HbA1c --   @ 14:03: Na 136, BUN 19, Cr <0.30<L>, <H>, K+ 4.7, Phos --, Mg 1.8, Alk Phos --, ALT/SGPT --, AST/SGOT --, HbA1c --    A1C with Estimated Average Glucose Result: 7.5 % (10-28-23 @ 07:18)    Finger Sticks:  POCT Blood Glucose.: 206 mg/dL ( @ 12:40)  POCT Blood Glucose.: 138 mg/dL ( @ 06:04)  POCT Blood Glucose.: 94 mg/dL ( @ 00:02)  POCT Blood Glucose.: 187 mg/dL ( @ 17:29)  POCT Blood Glucose.: 292 mg/dL ( @ 13:04)    Triglycerides, Serum: 192 mg/dL (23 @ 07:05)    Skin per nursing documentation: sacrum stage IV per flow sheets.   Edema per nursing documentation:  1+ right arm    Estimated Needs using IBW + 10% considering increased needs and edema   27-32kcal/kg 1296-1536kcal/day  1.3-1.6g/kg 62-77g protein/day  defer fluid needs to team    Previous Nutrition Diagnosis:  Increased Nutrient Needs  Nutrition Diagnosis is: [x] ongoing  [] resolved [] not applicable     Nutrition Care Plan:  [x] In Progress  [] Achieved  [] Not applicable    New Nutrition Diagnosis: [x] Not applicable    Nutrition Interventions:  : Spoke with pt daughter over phone. Explained current tube feed formula, importance and reason for change. Explained calories and protein provided by tube feeds. pt weight discussed, current weight. Discussed Alfred, vitamin C and Multivitamin importance/amount. Zinc 220mg discontinued, explained reasoning why (interaction with copper). All questions/concerns addressed.     Recommendations:      -Can continue VtagO Glucose Support. Update dose: 60ml/hr x 20 hours to provide a total volume of 1200ml, 1440kcal (30kcal/kg), 77g protein (1.6g/kg). Update tube feed time: start 06:00 and run for 20 hours until 02:00. 20 hour feed will allow time for synthroid administration, noted to be ordered to be given at 05:00.   -Alfred will provide additional glutamine and arginine amino acids with collagen for wound healing. continue.   -Continue Vitamin C and Multivitamin to aid in wound healing.   -Defer free water flush to team.     Monitoring and Evaluation:   Continue to monitor nutritional intake, tolerance to diet prescription, weights, labs, skin integrity    RD remains available upon request and will follow up per protocol  Rufina Morris, MS, RD, CDN / Teams

## 2023-11-29 NOTE — PROGRESS NOTE ADULT - SUBJECTIVE AND OBJECTIVE BOX
INTERVAL HPI/OVERNIGHT EVENTS:  Patient seen and examined. Tolerating PEG feeds.     MEDICATIONS  (STANDING):  albuterol/ipratropium for Nebulization 3 milliLiter(s) Nebulizer every 6 hours  amLODIPine   Tablet 10 milliGRAM(s) Oral daily  artificial  tears Solution 2 Drop(s) Both EYES four times a day  ascorbic acid 500 milliGRAM(s) Oral daily  calcium carbonate    500 mG (Tums) Chewable 1 Tablet(s) Chew every 12 hours  cephalexin 500 milliGRAM(s) Oral every 6 hours  chlorhexidine 0.12% Liquid 15 milliLiter(s) Oral Mucosa every 12 hours  chlorhexidine 4% Liquid 1 Application(s) Topical <User Schedule>  ciprofloxacin   IVPB 400 milliGRAM(s) IV Intermittent every 12 hours  dextrose 50% Injectable 12.5 Gram(s) IV Push once  dextrose 50% Injectable 25 Gram(s) IV Push once  dextrose 50% Injectable 25 Gram(s) IV Push once  dextrose 50% Injectable 50 milliLiter(s) IV Push once  dextrose Oral Gel 15 Gram(s) Oral once  escitalopram 10 milliGRAM(s) Oral <User Schedule>  gabapentin Solution 100 milliGRAM(s) Enteral Tube at bedtime  glucagon  Injectable 1 milliGRAM(s) IntraMuscular once  hemorrhoidal Ointment      hemorrhoidal Ointment 1 Application(s) Rectal daily  insulin glargine Injectable (LANTUS) 20 Unit(s) SubCutaneous every morning  insulin lispro (ADMELOG) corrective regimen sliding scale   SubCutaneous every 6 hours  insulin regular  human recombinant 17 Unit(s) SubCutaneous <User Schedule>  levothyroxine 125 MICROGram(s) Oral <User Schedule>  lidocaine   4% Patch 1 Patch Transdermal daily  melatonin 3 milliGRAM(s) Oral at bedtime  multivitamin/minerals/iron Oral Solution (CENTRUM) 15 milliLiter(s) Oral daily  nystatin Powder 1 Application(s) Topical every 8 hours  oxybutynin 5 milliGRAM(s) Oral daily  pantoprazole   Suspension 40 milliGRAM(s) Enteral Tube <User Schedule>  petrolatum Ophthalmic Ointment 1 Application(s) Both EYES four times a day  predniSONE   Tablet 5 milliGRAM(s) Oral daily  QUEtiapine 12.5 milliGRAM(s) Oral <User Schedule>  simethicone 80 milliGRAM(s) Chew four times a day  sodium chloride 3%  Inhalation 4 milliLiter(s) Inhalation every 12 hours  zinc oxide 40% Paste 1 Application(s) Topical daily    MEDICATIONS  (PRN):  acetaminophen   Oral Liquid .. 1000 milliGRAM(s) Enteral Tube every 6 hours PRN Temp greater or equal to 38C (100.4F), Mild Pain (1 - 3)  benzocaine 20% Spray 1 Spray(s) Topical four times a day PRN Cough  diphenhydrAMINE Elixir 25 milliGRAM(s) Oral every 6 hours PRN Rash and/or Itching  guaifenesin/dextromethorphan Oral Liquid 10 milliLiter(s) Oral every 6 hours PRN Cough  sodium chloride 0.65% Nasal 1 Spray(s) Both Nostrils every 6 hours PRN Nasal Congestion      Allergies    penicillin (Unknown)  heparin (Unknown)  Tagamet (Unknown)  pineapple (Unknown)  walnut (Unknown)  Pecan, Filbert, Hazelnut (Unknown)  Lyrica (Unknown)    Intolerances        Review of Systems:    unable to obtain      Vital Signs Last 24 Hrs  T(C): 36.5 (29 Nov 2023 14:11), Max: 36.9 (28 Nov 2023 20:52)  T(F): 97.7 (29 Nov 2023 14:11), Max: 98.5 (28 Nov 2023 20:52)  HR: 80 (29 Nov 2023 11:31) (76 - 108)  BP: 124/67 (29 Nov 2023 14:11) (110/57 - 136/64)  BP(mean): --  RR: 15 (29 Nov 2023 04:46) (15 - 21)  SpO2: 99% (29 Nov 2023 14:11) (98% - 100%)    Parameters below as of 29 Nov 2023 14:11  Patient On (Oxygen Delivery Method): ventilator        PHYSICAL EXAM:  GENERAL:   no distress  HEENT:  NC/AT,  conjunctivae anicteric, clear and pink, trach  NECK: supple, trachea midline  CHEST:  Full & symmetric excursion, no increased effort, breath sounds clear  HEART:  Regular rhythm, no JVD  ABDOMEN:  Soft, non-tender, non-distended, normoactive bowel sounds,  no masses , no hepatosplenomegaly, G tube with granulation tissue  EXTREMITIES:  no cyanosis, clubbing or edema  SKIN:  No rash, erythema, or, ecchymoses, no jaundice  NEURO:  Alert, non-focal, no asterixis    LABS:                        10.4   14.21 )-----------( 125      ( 29 Nov 2023 06:38 )             34.6     11-29    137  |  99  |  17  ----------------------------<  147<H>  4.1   |  28  |  <0.30<L>    Ca    9.3      29 Nov 2023 06:38  Phos  3.8     11-29  Mg     2.1     11-29    TPro  7.5  /  Alb  3.4  /  TBili  0.4  /  DBili  x   /  AST  25  /  ALT  23  /  AlkPhos  52  11-29      Urinalysis Basic - ( 29 Nov 2023 06:38 )    Color: x / Appearance: x / SG: x / pH: x  Gluc: 147 mg/dL / Ketone: x  / Bili: x / Urobili: x   Blood: x / Protein: x / Nitrite: x   Leuk Esterase: x / RBC: x / WBC x   Sq Epi: x / Non Sq Epi: x / Bacteria: x        RADIOLOGY & ADDITIONAL TESTS:

## 2023-11-29 NOTE — PROGRESS NOTE ADULT - ASSESSMENT
73 year old female with respiratory failure s/p trach and PEG, sacral osteomyelitis.    Abdominal pain   now improved  the patient is nontender on exam.     Erratic nature of bowel movements  there are numerous factors ranging from debility, antibiotics, and tube feeds.   laxatives to be given only PRN; bowel movements will continue to be inconsistent due to the aforementioned factors.    PEG   some drainage and oozing of blood is expected from the hypergranulation tissue at the PEG site, and that barrier creams and frequent dressing changes may help.  above discussed in detail with patients daughter     The plan of care was discussed with the physician assistant and modifications were made to the notation where appropriate.   Differential diagnosis and plan of care discussed with patient after the evaluation  35 minutes spent on total encounter of which more than fifty percent of the encounter was spent counseling and/or coordinating care by the attending physician.    [facility] [place]  Andrew Morgan M.D.   [address] [place] [address]  Office: [phone]  Cell: [phone]

## 2023-11-29 NOTE — PROGRESS NOTE ADULT - PROBLEM SELECTOR PLAN 5
- Home amlodipine held on admission, restarted -- continue monitoring BP   - Echo from 10/17/2020 notable for hyperdynamic L ventricular systolic function, moderately severe LVOT obstruction, and EF 81% (per Wilkinson calculation) vs 77% (per Teichholz calculation)

## 2023-11-30 LAB
ALBUMIN SERPL ELPH-MCNC: 3.2 G/DL — LOW (ref 3.3–5)
ALBUMIN SERPL ELPH-MCNC: 3.2 G/DL — LOW (ref 3.3–5)
ALP SERPL-CCNC: 52 U/L — SIGNIFICANT CHANGE UP (ref 40–120)
ALP SERPL-CCNC: 52 U/L — SIGNIFICANT CHANGE UP (ref 40–120)
ALT FLD-CCNC: 23 U/L — SIGNIFICANT CHANGE UP (ref 10–45)
ALT FLD-CCNC: 23 U/L — SIGNIFICANT CHANGE UP (ref 10–45)
ANION GAP SERPL CALC-SCNC: 11 MMOL/L — SIGNIFICANT CHANGE UP (ref 5–17)
ANION GAP SERPL CALC-SCNC: 11 MMOL/L — SIGNIFICANT CHANGE UP (ref 5–17)
AST SERPL-CCNC: 41 U/L — HIGH (ref 10–40)
AST SERPL-CCNC: 41 U/L — HIGH (ref 10–40)
BILIRUB SERPL-MCNC: 0.4 MG/DL — SIGNIFICANT CHANGE UP (ref 0.2–1.2)
BILIRUB SERPL-MCNC: 0.4 MG/DL — SIGNIFICANT CHANGE UP (ref 0.2–1.2)
BUN SERPL-MCNC: 14 MG/DL — SIGNIFICANT CHANGE UP (ref 7–23)
BUN SERPL-MCNC: 14 MG/DL — SIGNIFICANT CHANGE UP (ref 7–23)
CALCIUM SERPL-MCNC: 9.2 MG/DL — SIGNIFICANT CHANGE UP (ref 8.4–10.5)
CALCIUM SERPL-MCNC: 9.2 MG/DL — SIGNIFICANT CHANGE UP (ref 8.4–10.5)
CHLORIDE SERPL-SCNC: 98 MMOL/L — SIGNIFICANT CHANGE UP (ref 96–108)
CHLORIDE SERPL-SCNC: 98 MMOL/L — SIGNIFICANT CHANGE UP (ref 96–108)
CO2 SERPL-SCNC: 27 MMOL/L — SIGNIFICANT CHANGE UP (ref 22–31)
CO2 SERPL-SCNC: 27 MMOL/L — SIGNIFICANT CHANGE UP (ref 22–31)
CREAT SERPL-MCNC: <0.3 MG/DL — LOW (ref 0.5–1.3)
CREAT SERPL-MCNC: <0.3 MG/DL — LOW (ref 0.5–1.3)
EGFR: 113 ML/MIN/1.73M2 — SIGNIFICANT CHANGE UP
EGFR: 113 ML/MIN/1.73M2 — SIGNIFICANT CHANGE UP
GLUCOSE BLDC GLUCOMTR-MCNC: 118 MG/DL — HIGH (ref 70–99)
GLUCOSE BLDC GLUCOMTR-MCNC: 118 MG/DL — HIGH (ref 70–99)
GLUCOSE BLDC GLUCOMTR-MCNC: 141 MG/DL — HIGH (ref 70–99)
GLUCOSE BLDC GLUCOMTR-MCNC: 141 MG/DL — HIGH (ref 70–99)
GLUCOSE BLDC GLUCOMTR-MCNC: 147 MG/DL — HIGH (ref 70–99)
GLUCOSE BLDC GLUCOMTR-MCNC: 147 MG/DL — HIGH (ref 70–99)
GLUCOSE BLDC GLUCOMTR-MCNC: 247 MG/DL — HIGH (ref 70–99)
GLUCOSE BLDC GLUCOMTR-MCNC: 247 MG/DL — HIGH (ref 70–99)
GLUCOSE SERPL-MCNC: 130 MG/DL — HIGH (ref 70–99)
GLUCOSE SERPL-MCNC: 130 MG/DL — HIGH (ref 70–99)
HCT VFR BLD CALC: 34.7 % — SIGNIFICANT CHANGE UP (ref 34.5–45)
HCT VFR BLD CALC: 34.7 % — SIGNIFICANT CHANGE UP (ref 34.5–45)
HGB BLD-MCNC: 10.7 G/DL — LOW (ref 11.5–15.5)
HGB BLD-MCNC: 10.7 G/DL — LOW (ref 11.5–15.5)
MAGNESIUM SERPL-MCNC: 1.9 MG/DL — SIGNIFICANT CHANGE UP (ref 1.6–2.6)
MAGNESIUM SERPL-MCNC: 1.9 MG/DL — SIGNIFICANT CHANGE UP (ref 1.6–2.6)
MCHC RBC-ENTMCNC: 28.4 PG — SIGNIFICANT CHANGE UP (ref 27–34)
MCHC RBC-ENTMCNC: 28.4 PG — SIGNIFICANT CHANGE UP (ref 27–34)
MCHC RBC-ENTMCNC: 30.8 GM/DL — LOW (ref 32–36)
MCHC RBC-ENTMCNC: 30.8 GM/DL — LOW (ref 32–36)
MCV RBC AUTO: 92 FL — SIGNIFICANT CHANGE UP (ref 80–100)
MCV RBC AUTO: 92 FL — SIGNIFICANT CHANGE UP (ref 80–100)
NRBC # BLD: 0 /100 WBCS — SIGNIFICANT CHANGE UP (ref 0–0)
NRBC # BLD: 0 /100 WBCS — SIGNIFICANT CHANGE UP (ref 0–0)
PHOSPHATE SERPL-MCNC: 4 MG/DL — SIGNIFICANT CHANGE UP (ref 2.5–4.5)
PHOSPHATE SERPL-MCNC: 4 MG/DL — SIGNIFICANT CHANGE UP (ref 2.5–4.5)
PLATELET # BLD AUTO: 132 K/UL — LOW (ref 150–400)
PLATELET # BLD AUTO: 132 K/UL — LOW (ref 150–400)
POTASSIUM SERPL-MCNC: 4.4 MMOL/L — SIGNIFICANT CHANGE UP (ref 3.5–5.3)
POTASSIUM SERPL-MCNC: 4.4 MMOL/L — SIGNIFICANT CHANGE UP (ref 3.5–5.3)
POTASSIUM SERPL-SCNC: 4.4 MMOL/L — SIGNIFICANT CHANGE UP (ref 3.5–5.3)
POTASSIUM SERPL-SCNC: 4.4 MMOL/L — SIGNIFICANT CHANGE UP (ref 3.5–5.3)
PROT SERPL-MCNC: 7.1 G/DL — SIGNIFICANT CHANGE UP (ref 6–8.3)
PROT SERPL-MCNC: 7.1 G/DL — SIGNIFICANT CHANGE UP (ref 6–8.3)
RBC # BLD: 3.77 M/UL — LOW (ref 3.8–5.2)
RBC # BLD: 3.77 M/UL — LOW (ref 3.8–5.2)
RBC # FLD: 16.6 % — HIGH (ref 10.3–14.5)
RBC # FLD: 16.6 % — HIGH (ref 10.3–14.5)
SODIUM SERPL-SCNC: 136 MMOL/L — SIGNIFICANT CHANGE UP (ref 135–145)
SODIUM SERPL-SCNC: 136 MMOL/L — SIGNIFICANT CHANGE UP (ref 135–145)
WBC # BLD: 9.12 K/UL — SIGNIFICANT CHANGE UP (ref 3.8–10.5)
WBC # BLD: 9.12 K/UL — SIGNIFICANT CHANGE UP (ref 3.8–10.5)
WBC # FLD AUTO: 9.12 K/UL — SIGNIFICANT CHANGE UP (ref 3.8–10.5)
WBC # FLD AUTO: 9.12 K/UL — SIGNIFICANT CHANGE UP (ref 3.8–10.5)

## 2023-11-30 PROCEDURE — 99232 SBSQ HOSP IP/OBS MODERATE 35: CPT

## 2023-11-30 PROCEDURE — 99233 SBSQ HOSP IP/OBS HIGH 50: CPT

## 2023-11-30 RX ORDER — INSULIN HUMAN 100 [IU]/ML
19 INJECTION, SOLUTION SUBCUTANEOUS
Refills: 0 | Status: DISCONTINUED | OUTPATIENT
Start: 2023-12-01 | End: 2023-12-03

## 2023-11-30 RX ORDER — INSULIN HUMAN 100 [IU]/ML
17 INJECTION, SOLUTION SUBCUTANEOUS
Refills: 0 | Status: DISCONTINUED | OUTPATIENT
Start: 2023-11-30 | End: 2023-12-01

## 2023-11-30 RX ORDER — MAGNESIUM SULFATE 500 MG/ML
2 VIAL (ML) INJECTION ONCE
Refills: 0 | Status: COMPLETED | OUTPATIENT
Start: 2023-11-30 | End: 2023-11-30

## 2023-11-30 RX ORDER — INSULIN HUMAN 100 [IU]/ML
8 INJECTION, SOLUTION SUBCUTANEOUS ONCE
Refills: 0 | Status: COMPLETED | OUTPATIENT
Start: 2023-11-30 | End: 2023-11-30

## 2023-11-30 RX ORDER — INSULIN HUMAN 100 [IU]/ML
19 INJECTION, SOLUTION SUBCUTANEOUS
Refills: 0 | Status: DISCONTINUED | OUTPATIENT
Start: 2023-11-30 | End: 2023-11-30

## 2023-11-30 RX ORDER — MAGNESIUM SULFATE 500 MG/ML
2 VIAL (ML) INJECTION ONCE
Refills: 0 | Status: DISCONTINUED | OUTPATIENT
Start: 2023-11-30 | End: 2023-11-30

## 2023-11-30 RX ADMIN — Medication 1 APPLICATION(S): at 23:31

## 2023-11-30 RX ADMIN — Medication 1000 MILLIGRAM(S): at 16:49

## 2023-11-30 RX ADMIN — NYSTATIN CREAM 1 APPLICATION(S): 100000 CREAM TOPICAL at 05:04

## 2023-11-30 RX ADMIN — Medication 1 APPLICATION(S): at 11:07

## 2023-11-30 RX ADMIN — Medication 5 MILLIGRAM(S): at 05:03

## 2023-11-30 RX ADMIN — Medication 200 MILLIGRAM(S): at 05:04

## 2023-11-30 RX ADMIN — Medication 200 MILLIGRAM(S): at 17:56

## 2023-11-30 RX ADMIN — SODIUM CHLORIDE 4 MILLILITER(S): 9 INJECTION INTRAMUSCULAR; INTRAVENOUS; SUBCUTANEOUS at 05:08

## 2023-11-30 RX ADMIN — Medication 125 MICROGRAM(S): at 05:03

## 2023-11-30 RX ADMIN — Medication 1 APPLICATION(S): at 17:57

## 2023-11-30 RX ADMIN — Medication 15 MILLILITER(S): at 11:02

## 2023-11-30 RX ADMIN — Medication 3 MILLILITER(S): at 17:18

## 2023-11-30 RX ADMIN — Medication 2 DROP(S): at 05:05

## 2023-11-30 RX ADMIN — INSULIN GLARGINE 20 UNIT(S): 100 INJECTION, SOLUTION SUBCUTANEOUS at 06:16

## 2023-11-30 RX ADMIN — INSULIN HUMAN 17 UNIT(S): 100 INJECTION, SOLUTION SUBCUTANEOUS at 06:15

## 2023-11-30 RX ADMIN — Medication 500 MILLIGRAM(S): at 23:29

## 2023-11-30 RX ADMIN — CHLORHEXIDINE GLUCONATE 15 MILLILITER(S): 213 SOLUTION TOPICAL at 05:04

## 2023-11-30 RX ADMIN — CHLORHEXIDINE GLUCONATE 1 APPLICATION(S): 213 SOLUTION TOPICAL at 05:05

## 2023-11-30 RX ADMIN — Medication 1 TABLET(S): at 17:56

## 2023-11-30 RX ADMIN — Medication 3 MILLILITER(S): at 05:07

## 2023-11-30 RX ADMIN — SIMETHICONE 80 MILLIGRAM(S): 80 TABLET, CHEWABLE ORAL at 23:31

## 2023-11-30 RX ADMIN — GABAPENTIN 100 MILLIGRAM(S): 400 CAPSULE ORAL at 22:14

## 2023-11-30 RX ADMIN — Medication 2 DROP(S): at 17:57

## 2023-11-30 RX ADMIN — Medication 1 MILLIGRAM(S): at 22:19

## 2023-11-30 RX ADMIN — Medication 1000 MILLIGRAM(S): at 18:20

## 2023-11-30 RX ADMIN — Medication 500 MILLIGRAM(S): at 11:02

## 2023-11-30 RX ADMIN — LIDOCAINE 1 PATCH: 4 CREAM TOPICAL at 07:09

## 2023-11-30 RX ADMIN — INSULIN HUMAN 17 UNIT(S): 100 INJECTION, SOLUTION SUBCUTANEOUS at 12:06

## 2023-11-30 RX ADMIN — Medication 3 MILLILITER(S): at 11:28

## 2023-11-30 RX ADMIN — Medication 1 TABLET(S): at 05:03

## 2023-11-30 RX ADMIN — INSULIN HUMAN 8 UNIT(S): 100 INJECTION, SOLUTION SUBCUTANEOUS at 18:22

## 2023-11-30 RX ADMIN — PHENYLEPHRINE-SHARK LIVER OIL-MINERAL OIL-PETROLATUM RECTAL OINTMENT 1 APPLICATION(S): at 11:11

## 2023-11-30 RX ADMIN — ZINC OXIDE 1 APPLICATION(S): 200 OINTMENT TOPICAL at 11:11

## 2023-11-30 RX ADMIN — NYSTATIN CREAM 1 APPLICATION(S): 100000 CREAM TOPICAL at 14:33

## 2023-11-30 RX ADMIN — SIMETHICONE 80 MILLIGRAM(S): 80 TABLET, CHEWABLE ORAL at 11:02

## 2023-11-30 RX ADMIN — AMLODIPINE BESYLATE 10 MILLIGRAM(S): 2.5 TABLET ORAL at 05:03

## 2023-11-30 RX ADMIN — PANTOPRAZOLE SODIUM 40 MILLIGRAM(S): 20 TABLET, DELAYED RELEASE ORAL at 05:03

## 2023-11-30 RX ADMIN — LIDOCAINE 1 PATCH: 4 CREAM TOPICAL at 20:10

## 2023-11-30 RX ADMIN — SODIUM CHLORIDE 4 MILLILITER(S): 9 INJECTION INTRAMUSCULAR; INTRAVENOUS; SUBCUTANEOUS at 17:18

## 2023-11-30 RX ADMIN — CHLORHEXIDINE GLUCONATE 15 MILLILITER(S): 213 SOLUTION TOPICAL at 17:57

## 2023-11-30 RX ADMIN — Medication 500 MILLIGRAM(S): at 17:56

## 2023-11-30 RX ADMIN — Medication 5 MILLIGRAM(S): at 22:18

## 2023-11-30 RX ADMIN — Medication 500 MILLIGRAM(S): at 05:03

## 2023-11-30 RX ADMIN — LIDOCAINE 1 PATCH: 4 CREAM TOPICAL at 11:03

## 2023-11-30 RX ADMIN — Medication 2 DROP(S): at 23:32

## 2023-11-30 RX ADMIN — Medication 3 MILLILITER(S): at 23:06

## 2023-11-30 RX ADMIN — LIDOCAINE 1 PATCH: 4 CREAM TOPICAL at 23:12

## 2023-11-30 RX ADMIN — SIMETHICONE 80 MILLIGRAM(S): 80 TABLET, CHEWABLE ORAL at 05:04

## 2023-11-30 RX ADMIN — Medication 1 APPLICATION(S): at 05:04

## 2023-11-30 RX ADMIN — SIMETHICONE 80 MILLIGRAM(S): 80 TABLET, CHEWABLE ORAL at 17:56

## 2023-11-30 RX ADMIN — Medication 1 SPRAY(S): at 23:31

## 2023-11-30 RX ADMIN — Medication 4: at 12:06

## 2023-11-30 RX ADMIN — QUETIAPINE FUMARATE 12.5 MILLIGRAM(S): 200 TABLET, FILM COATED ORAL at 17:58

## 2023-11-30 RX ADMIN — Medication 25 GRAM(S): at 11:03

## 2023-11-30 RX ADMIN — NYSTATIN CREAM 1 APPLICATION(S): 100000 CREAM TOPICAL at 22:19

## 2023-11-30 RX ADMIN — Medication 5 MILLIGRAM(S): at 11:02

## 2023-11-30 RX ADMIN — ESCITALOPRAM OXALATE 10 MILLIGRAM(S): 10 TABLET, FILM COATED ORAL at 07:44

## 2023-11-30 RX ADMIN — Medication 2 DROP(S): at 11:06

## 2023-11-30 NOTE — PROGRESS NOTE ADULT - SUBJECTIVE AND OBJECTIVE BOX
seen earlier today     Chief Complaint: Diabetes Mellitus follow up    INTERVAL HX: tolerating tf as per RD recs yesterday ; spoke with daughter Marysol over phone per family at bedside request , daughter reports she cannot visit as she has the flu , daughter concerned as patient was previously receiving [facility] and had hyperglycemia- discussed with team - per team patient has been receiving [facility] [employer] glucose support for almost a week now.      Review of Systems:  General: As above  GI: No nausea, vomiting  Endocrine: no  S&Sx of hypoglycemia    Allergies    penicillin (Unknown)  heparin (Unknown)  Tagamet (Unknown)  pineapple (Unknown)  walnut (Unknown)  Pecan, Filbert, Hazelnut (Unknown)  Lyrica (Unknown)    Intolerances      MEDICATIONS  (STANDING):  albuterol/ipratropium for Nebulization 3 milliLiter(s) Nebulizer every 6 hours  amLODIPine   Tablet 10 milliGRAM(s) Oral daily  artificial  tears Solution 2 Drop(s) Both EYES four times a day  ascorbic acid 500 milliGRAM(s) Oral daily  calcium carbonate    500 mG (Tums) Chewable 1 Tablet(s) Chew every 12 hours  cephalexin 500 milliGRAM(s) Oral every 6 hours  chlorhexidine 0.12% Liquid 15 milliLiter(s) Oral Mucosa every 12 hours  chlorhexidine 4% Liquid 1 Application(s) Topical <User Schedule>  ciprofloxacin   IVPB 400 milliGRAM(s) IV Intermittent every 12 hours  dextrose 50% Injectable 12.5 Gram(s) IV Push once  dextrose 50% Injectable 25 Gram(s) IV Push once  dextrose 50% Injectable 50 milliLiter(s) IV Push once  dextrose 50% Injectable 25 Gram(s) IV Push once  dextrose Oral Gel 15 Gram(s) Oral once  escitalopram 10 milliGRAM(s) Oral <User Schedule>  gabapentin Solution 100 milliGRAM(s) Enteral Tube at bedtime  glucagon  Injectable 1 milliGRAM(s) IntraMuscular once  hemorrhoidal Ointment      hemorrhoidal Ointment 1 Application(s) Rectal daily  insulin glargine Injectable (LANTUS) 20 Unit(s) SubCutaneous every morning  insulin lispro (ADMELOG) corrective regimen sliding scale   SubCutaneous every 6 hours  insulin regular  human recombinant 19 Unit(s) SubCutaneous <User Schedule>  levothyroxine 125 MICROGram(s) Oral <User Schedule>  lidocaine   4% Patch 1 Patch Transdermal daily  melatonin 1 milliGRAM(s) Oral at bedtime  melatonin 5 milliGRAM(s) Oral at bedtime  multivitamin/minerals/iron Oral Solution (CENTRUM) 15 milliLiter(s) Oral daily  nystatin Powder 1 Application(s) Topical every 8 hours  oxybutynin 5 milliGRAM(s) Oral daily  pantoprazole   Suspension 40 milliGRAM(s) Enteral Tube <User Schedule>  petrolatum Ophthalmic Ointment 1 Application(s) Both EYES four times a day  predniSONE   Tablet 5 milliGRAM(s) Oral daily  QUEtiapine 12.5 milliGRAM(s) Oral <User Schedule>  simethicone 80 milliGRAM(s) Chew four times a day  sodium chloride 3%  Inhalation 4 milliLiter(s) Inhalation every 12 hours  zinc oxide 40% Paste 1 Application(s) Topical daily        insulin glargine Injectable (LANTUS)   20 Unit(s) SubCutaneous (11-30-23 @ 06:16)    insulin lispro (ADMELOG) corrective regimen sliding scale   4 Unit(s) SubCutaneous (11-30-23 @ 12:06)   2 Unit(s) SubCutaneous (11-29-23 @ 18:03)    insulin regular  human recombinant   17 Unit(s) SubCutaneous (11-30-23 @ 12:06)   17 Unit(s) SubCutaneous (11-30-23 @ 06:15)   17 Unit(s) SubCutaneous (11-29-23 @ 18:04)    levothyroxine   125 MICROGram(s) Oral (11-30-23 @ 05:03)    predniSONE   Tablet   5 milliGRAM(s) Oral (11-30-23 @ 05:03)        PHYSICAL EXAM:  VITALS: T(C): 36.2 (11-30-23 @ 13:11)  T(F): 97.2 (11-30-23 @ 13:11), Max: 98.7 (11-29-23 @ 21:00)  HR: 94 (11-30-23 @ 14:46) (80 - 109)  BP: 136/72 (11-30-23 @ 13:11) (116/88 - 163/83)  RR:  (20 - 20)  SpO2:  (97% - 100%)  Wt(kg): --  GENERAL: NAD, peg with TF running,   Respiratory: Respirations unlabored trach to vent   Extremities: Warm, no edema  NEURO: Alert      LABS:  POCT Blood Glucose.: 247 mg/dL (11-30-23 @ 11:39)  POCT Blood Glucose.: 141 mg/dL (11-30-23 @ 05:31)  POCT Blood Glucose.: 118 mg/dL (11-29-23 @ 23:59)  POCT Blood Glucose.: 164 mg/dL (11-29-23 @ 17:38)  POCT Blood Glucose.: 206 mg/dL (11-29-23 @ 12:40)  POCT Blood Glucose.: 138 mg/dL (11-29-23 @ 06:04)  POCT Blood Glucose.: 94 mg/dL (11-29-23 @ 00:02)  POCT Blood Glucose.: 187 mg/dL (11-28-23 @ 17:29)  POCT Blood Glucose.: 292 mg/dL (11-28-23 @ 13:04)  POCT Blood Glucose.: 170 mg/dL (11-28-23 @ 08:58)  POCT Blood Glucose.: 89 mg/dL (11-28-23 @ 05:32)  POCT Blood Glucose.: 98 mg/dL (11-27-23 @ 23:45)  POCT Blood Glucose.: 89 mg/dL (11-27-23 @ 19:38)  POCT Blood Glucose.: 108 mg/dL (11-27-23 @ 18:21)                          10.7   9.12  )-----------( 132      ( 30 Nov 2023 07:13 )             34.7     11-30    136  |  98  |  14  ----------------------------<  130<H>  4.4   |  27  |  <0.30<L>    Ca    9.2      30 Nov 2023 07:15  Phos  4.0     11-30  Mg     1.9     11-30    TPro  7.1  /  Alb  3.2<L>  /  TBili  0.4  /  DBili  x   /  AST  41<H>  /  ALT  23  /  AlkPhos  52  11-30    eGFR: 113 mL/min/1.73m2 (30 Nov 2023 07:15)    11-25 Chol -- Direct LDL -- LDL calculated -- HDL -- Trig 192<H>  Thyroid Function Tests:  11-28 @ 06:37 TSH 2.80 FreeT4 1.2 T3 -- Anti TPO -- Anti Thyroglobulin Ab -- TSI --  11-10 @ 06:26 TSH 7.90 FreeT4 0.7 T3 -- Anti TPO -- Anti Thyroglobulin Ab -- TSI --      A1C with Estimated Average Glucose Result: 7.5 % (10-28-23 @ 07:18)    Estimated Average Glucose: 169 mg/dL (10-28-23 @ 07:18)        Diet, NPO with Tube Feed:   Tube Feeding Modality: Gastrostomy  [facility] glucose support 1.2  Total Volume for 24 Hours (mL): 1200  Continuous  Starting Tube Feed Rate mL per Hour: 60  Increase Tube Feed Rate by (mL): 0  Until Goal Tube Feed Rate (mL per Hour): 60  Tube Feed Duration (in Hours): 20  Tube Feed Start Time: 06:00  Tube Feed Stop Time: 02:00  Free Water Flush  Pump   Rate (mL per Hour): 10   Frequency: Every 2 Hours  Alfred(7 Gm Arginine/7 Gm Glut/1.2 Gm HMB     Qty per Day:  2 (11-29-23 @ 14:12) [Active]

## 2023-11-30 NOTE — PROGRESS NOTE ADULT - PROBLEM SELECTOR PLAN 8
Continue Home Lexapro 10mg daily   Psych consult appreciated   - Continue Seroquel 12.5mg daily (3PM)

## 2023-11-30 NOTE — PROGRESS NOTE ADULT - SUBJECTIVE AND OBJECTIVE BOX
INTERVAL HPI/OVERNIGHT EVENTS:  Patient seen and examined. Tolerating PEG feeds.     MEDICATIONS  (STANDING):  albuterol/ipratropium for Nebulization 3 milliLiter(s) Nebulizer every 6 hours  amLODIPine   Tablet 10 milliGRAM(s) Oral daily  artificial  tears Solution 2 Drop(s) Both EYES four times a day  ascorbic acid 500 milliGRAM(s) Oral daily  calcium carbonate    500 mG (Tums) Chewable 1 Tablet(s) Chew every 12 hours  cephalexin 500 milliGRAM(s) Oral every 6 hours  chlorhexidine 0.12% Liquid 15 milliLiter(s) Oral Mucosa every 12 hours  chlorhexidine 4% Liquid 1 Application(s) Topical <User Schedule>  ciprofloxacin   IVPB 400 milliGRAM(s) IV Intermittent every 12 hours  dextrose 50% Injectable 12.5 Gram(s) IV Push once  dextrose 50% Injectable 25 Gram(s) IV Push once  dextrose 50% Injectable 25 Gram(s) IV Push once  dextrose 50% Injectable 50 milliLiter(s) IV Push once  dextrose Oral Gel 15 Gram(s) Oral once  escitalopram 10 milliGRAM(s) Oral <User Schedule>  gabapentin Solution 100 milliGRAM(s) Enteral Tube at bedtime  glucagon  Injectable 1 milliGRAM(s) IntraMuscular once  hemorrhoidal Ointment      hemorrhoidal Ointment 1 Application(s) Rectal daily  insulin glargine Injectable (LANTUS) 20 Unit(s) SubCutaneous every morning  insulin lispro (ADMELOG) corrective regimen sliding scale   SubCutaneous every 6 hours  insulin regular  human recombinant 17 Unit(s) SubCutaneous <User Schedule>  levothyroxine 125 MICROGram(s) Oral <User Schedule>  lidocaine   4% Patch 1 Patch Transdermal daily  melatonin 3 milliGRAM(s) Oral at bedtime  multivitamin/minerals/iron Oral Solution (CENTRUM) 15 milliLiter(s) Oral daily  nystatin Powder 1 Application(s) Topical every 8 hours  oxybutynin 5 milliGRAM(s) Oral daily  pantoprazole   Suspension 40 milliGRAM(s) Enteral Tube <User Schedule>  petrolatum Ophthalmic Ointment 1 Application(s) Both EYES four times a day  predniSONE   Tablet 5 milliGRAM(s) Oral daily  QUEtiapine 12.5 milliGRAM(s) Oral <User Schedule>  simethicone 80 milliGRAM(s) Chew four times a day  sodium chloride 3%  Inhalation 4 milliLiter(s) Inhalation every 12 hours  zinc oxide 40% Paste 1 Application(s) Topical daily    MEDICATIONS  (PRN):  acetaminophen   Oral Liquid .. 1000 milliGRAM(s) Enteral Tube every 6 hours PRN Temp greater or equal to 38C (100.4F), Mild Pain (1 - 3)  benzocaine 20% Spray 1 Spray(s) Topical four times a day PRN Cough  diphenhydrAMINE Elixir 25 milliGRAM(s) Oral every 6 hours PRN Rash and/or Itching  guaifenesin/dextromethorphan Oral Liquid 10 milliLiter(s) Oral every 6 hours PRN Cough  sodium chloride 0.65% Nasal 1 Spray(s) Both Nostrils every 6 hours PRN Nasal Congestion      Allergies    penicillin (Unknown)  heparin (Unknown)  Tagamet (Unknown)  pineapple (Unknown)  walnut (Unknown)  Pecan, Filbert, Hazelnut (Unknown)  Lyrica (Unknown)    Intolerances        Review of Systems:    unable to obtain    Vital Signs:   Vital Signs Last 24 Hrs  T(C): 36.6 (30 Nov 2023 04:25), Max: 37.1 (29 Nov 2023 21:00)  T(F): 97.9 (30 Nov 2023 04:25), Max: 98.7 (29 Nov 2023 21:00)  HR: 94 (30 Nov 2023 09:55) (80 - 109)  BP: 163/83 (30 Nov 2023 04:25) (116/88 - 163/83)  BP(mean): --  RR: 20 (30 Nov 2023 04:25) (17 - 20)  SpO2: 100% (30 Nov 2023 09:55) (97% - 100%)    Parameters below as of 30 Nov 2023 05:18  Patient On (Oxygen Delivery Method): ventilator      Daily     Daily           PHYSICAL EXAM:  GENERAL:   no distress  HEENT:  NC/AT,  conjunctivae anicteric, clear and pink, trach  NECK: supple, trachea midline  CHEST:  Full & symmetric excursion, no increased effort, breath sounds clear  HEART:  Regular rhythm, no JVD  ABDOMEN:  Soft, non-tender, non-distended, normoactive bowel sounds,  no masses , no hepatosplenomegaly, G tube with granulation tissue  EXTREMITIES:  no cyanosis, clubbing or edema  SKIN:  No rash, erythema, or, ecchymoses, no jaundice  NEURO:  Alert, non-focal, no asterixis    LABS:                        10.7   9.12  )-----------( 132      ( 30 Nov 2023 07:13 )             34.7     11-30    136  |  98  |  14  ----------------------------<  130<H>  4.4   |  27  |  <0.30<L>    Ca    9.2      30 Nov 2023 07:15  Phos  4.0     11-30  Mg     1.9     11-30    TPro  7.1  /  Alb  3.2<L>  /  TBili  0.4  /  DBili  x   /  AST  41<H>  /  ALT  23  /  AlkPhos  52  11-30      Urinalysis Basic - ( 30 Nov 2023 07:15 )    Color: x / Appearance: x / SG: x / pH: x  Gluc: 130 mg/dL / Ketone: x  / Bili: x / Urobili: x   Blood: x / Protein: x / Nitrite: x   Leuk Esterase: x / RBC: x / WBC x   Sq Epi: x / Non Sq Epi: x / Bacteria: x               RADIOLOGY & ADDITIONAL TESTS:

## 2023-11-30 NOTE — PROGRESS NOTE ADULT - NUTRITIONAL ASSESSMENT
Diet, NPO with Tube Feed:   Tube Feeding Modality: Gastrostomy  [facility] glucose support 1.2  Total Volume for 24 Hours (mL): 1200  Continuous  Starting Tube Feed Rate {mL per Hour}: 60  Increase Tube Feed Rate by (mL): 0  Until Goal Tube Feed Rate (mL per Hour): 60  Tube Feed Duration (in Hours): 20  Tube Feed Start Time: 06:00  Tube Feed Stop Time: 02:00  Free Water Flush  Pump   Rate (mL per Hour): 10   Frequency: Every 2 Hours  Alfred(7 Gm Arginine/7 Gm Glut/1.2 Gm HMB     Qty per Day:  2 (11-29-23 @ 14:12) [Active]

## 2023-11-30 NOTE — PROGRESS NOTE ADULT - PROBLEM SELECTOR PLAN 1
Found w/ Bilateral PNA vs Atelectasis, and Possible OM Sacrum   - S/p Chest CT (10/28):  c/w Bilateral PNA vs Atelectasis   - s/p Vanco, Aztreonam   - Blood cx: 11/12 -- neg   - urine culture: negative   - Sputum cx + Pseudomonas aeruginosa, S. aureus  - Sputum Cx 11/6: + PSA and Serratia, Cefepime 11/8 -->11/15  - 11/22 & 11/26 blood cultures NGTD  - 11/26 sputum culture MDR pseudomonas - clinically stable, defer treatment unless decompensates as per ID  - 11/26 urine culture - enterococcus - no need to treat - cfu low, organism not significant as per ID  -- see tx of sacral OM below

## 2023-11-30 NOTE — SPEECH LANGUAGE PATHOLOGY EVALUATION - COMMENTS
Continued history:   >ED course notable for CT neck imaging showing no evidence of active bleeding around the tracheostomy site, no hematomas, and no drainable collection around the tracheostomy site.  > CT abdomen/pelvis notable for gastrostomy tube localized to the stomach with mild thickening noted along the tract; a sacral decubitus ulcer extending to the coccyx with osseous remodeling, possibly representing osteomyelitis; and bibasilar patchy opacities with volume loss in the lungs, representing atelectasis vs infection.   >stage 4 sacral pressure injury  >Pelvic MRI imaging also suggestive of Acute OM.   >Patient placed on long-term antibiotics with Infectious disease guidance.  Additionally treated with a 7d course of Cefepime due to PSA and Serratia tracheitis on 11/8-->11/15 and then resumed cipro/keflex.   Hospital course c/b Delirium for which Seroquel was added and home Lexapro continued. Tracheostomy changed to #9 Cuffed [place] by ENT 11/3.  Despite trach change patient remained with persistent air leak.  On 11/19, the trach was again changed due to leak and loss of volumes on vent.  Trach was changed to #8 distal cuffed Shiley.  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  >Patient seen by Dermatology for back lesions.  One diagnosed as a seborrheic keratitis and the other a dermatofibroma.   >Nov 14: ENT note-->  #8 shiley distal XLT cuffed.     Speech & Swallow : New to service.   >11/30 verbal: Daughter reports pt had a FEEs at [place] and was cleared for a diet while on a vent and had SLP at home with patient having po; mainly small sips of thin liquids and minced solids for comfort. No other information provided verbally. An AAC/Alternative and Augmentative Communication Evaluation was completed this date. The patient has good communication intent. They are able to follow 1-2 step directives. Visual /Comprehension abilities, as related to communication, include ability to maintain fixation on stationary objects,  recognize people,  photographs, common objects, words,  phrases;  can scan a line left to right without moving head,  can scan matrix of symbols / words/ pictures in a VF of 16+. Currently, patient is attempting to communicate via gesturing/ facial expression /body position changes and mouthing words.     There are not contraindications with head/neck movements including per discussion with BERNA Narayan and PRITI Nunez.  Trach presence not negatively impacting movement. Therefore, motor skills as related to head/neck which included turning head to R/L Extension of head (look upwards) and Depression of head (look downwards) appeared to be functional for possible use of low tech eye gaze device vs other. Patient was able to repair communication breakdown  via shake/nod head/mouth words.   Fine motor movements including ability to hold a writing implement /  grasp/squeeze /  lift hand/arm (R/L) /  press a button (one or multiple fingers) / Use a finger to drag/navigate a device  /  other were limited given deconditioned status; unable to point finger or hold writing implement.      Patient is a candidate for AAC with use Direct Selection via infrared pointer and communication board.       GOAL-Sp-Pt to communicate wants/needs effectively during hospital course so as to be an active participant in her care.

## 2023-11-30 NOTE — PROGRESS NOTE ADULT - ASSESSMENT
72 y/o F with PMHx of T2DM, HTN, HLD, anemia, hypothyroidism, RA, fibromyalgia, remote cerebral aneurysm repair, acute hypercapnic respiratory failure now with tracheostomy, PEG tube, and bedbound (trach change 8/18, PEG change 8/14), sacral pressure ulcer, BIBEMS from home for 6 day hx of bleeding from the tracheostomy and PEG tube with associated abdominal pain, recent history of auditory and visual hallucinations, and with chronic sacral decubitus ulcer. Exam notable for mild abdominal distention with LLQ and RLQ tenderness, tracheostomy in place with no obvious bleeding, PEG tube with scant amount of dried blood, at baseline neurologic status. Imaging showing no active bleeding around tracheostomy site, however was notable for mild thickening along PEG tube tract, sacral ulcer extending to the coccyx suggestive of possible osteomyelitis, and bibasilar patchy lung opacities with volume loss. Patient admitted for respiratory support, wound care of tracheostomy and PEG, and management of sacral osteomyelitis. No further Trach and peg site  bleeding noted since admission.  Pelvic MRI imaging also suggestive of Acute OM. Patient placed on long-term antibiotics with Infectious disease guidance.  Additionally treated with a 7d course of Cefepime due to PSA and Serratia tracheitis on 11/8-->11/15 and then resumed cipro/keflex.  Hospital course c/b Delirium for which Seroquel was added and home Lexapro continued. Tracheostomy changed to #9 Cuffed Portex by ENT 11/3.  Despite trach change patient remained with persistent air leak.  On 11/19, the trach was again changed due to leak and loss of volumes on vent.  Trach was changed to #8 distal cuffed Shiley.  Patient seen by Dermatology for back lesions.  One diagnosed as a seborrheic keratitis and the other a dermatofibroma.       11/29:  WBC increased on this AM labs, monitoring for now.  Tube feeds adjusted as per nutrition and endocrine recs.    72 y/o F with PMHx of T2DM, HTN, HLD, anemia, hypothyroidism, RA, fibromyalgia, remote cerebral aneurysm repair, acute hypercapnic respiratory failure now with tracheostomy, PEG tube, and bedbound (trach change 8/18, PEG change 8/14), sacral pressure ulcer, BIBEMS from home for 6 day hx of bleeding from the tracheostomy and PEG tube with associated abdominal pain, recent history of auditory and visual hallucinations, and with chronic sacral decubitus ulcer. Exam notable for mild abdominal distention with LLQ and RLQ tenderness, tracheostomy in place with no obvious bleeding, PEG tube with scant amount of dried blood, at baseline neurologic status. Imaging showing no active bleeding around tracheostomy site, however was notable for mild thickening along PEG tube tract, sacral ulcer extending to the coccyx suggestive of possible osteomyelitis, and bibasilar patchy lung opacities with volume loss. Patient admitted for respiratory support, wound care of tracheostomy and PEG, and management of sacral osteomyelitis. No further Trach and peg site  bleeding noted since admission.  Pelvic MRI imaging also suggestive of Acute OM. Patient placed on long-term antibiotics with Infectious disease guidance.  Additionally treated with a 7d course of Cefepime due to PSA and Serratia tracheitis on 11/8-->11/15 and then resumed cipro/keflex.  Hospital course c/b Delirium for which Seroquel was added and home Lexapro continued. Tracheostomy changed to #9 Cuffed [place] by ENT 11/3.  Despite trach change patient remained with persistent air leak.  On 11/19, the trach was again changed due to leak and loss of volumes on vent.  Trach was changed to #8 distal cuffed Shiley.  Patient seen by Dermatology for back lesions.  One diagnosed as a seborrheic keratitis and the other a dermatofibroma.       11/30: No issues

## 2023-11-30 NOTE — PROGRESS NOTE ADULT - PROBLEM SELECTOR PLAN 2
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   - S/p CT abd/pelvis (10/28): Sacral decubitus ulcer extending to the coccyx with osseous remodeling and pelvic MRI or bone scan to recc to exclude osteomyelitis.  - 10/30 wound care note: Sacral stage 4 pressure injury 4.5cm x 2.5cm x 1.5cm w/ lip of undermining circumferentially greatest 9-12 of 3cm.  - MRI pelvis 10/30 with Osteomyelitis, c/w current Cefepime for 7 days, Vancomycin d/marli   - Elevated, ESR 85   - Elevated,    - Wound care on board  - 11/10: resumed Cipro 500mg q 12 and Keflex 500mg q 6 until 12/10.  - 11/20: Changed to IV Cipro 400 q12 as recommended by ID pharmacist due to multiple drug interactions when given via PEG  - 11/28 wound care note: Sacral stage 4pressure injury 4.5cm x 2.5cm x 0.5cm, moist granular wound bed   palpable but not exposed bone no blistering, (+) serosanguinous drainage. No odor, erythema, increased warmth, tenderness, induration, fluctuance, nor crepitus  - wound culture pending 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   - S/p CT abd/pelvis (10/28): Sacral decubitus ulcer extending to the coccyx with osseous remodeling and pelvic MRI or bone scan to recc to exclude osteomyelitis.  - 10/30 wound care note: Sacral stage 4 pressure injury 4.5cm x 2.5cm x 1.5cm w/ lip of undermining circumferentially greatest 9-12 of 3cm.  - MRI pelvis 10/30 with Osteomyelitis, c/w current Cefepime for 7 days, Vancomycin d/marli   - Elevated, ESR 85   - Elevated,    - Wound care on board  - 11/10: resumed Cipro 500mg q 12 and Keflex 500mg q 6 until 12/10.  - 11/20: Changed to IV Cipro 400 q12 as recommended by ID pharmacist due to multiple drug interactions when given via PEG  - 11/28 wound care note: Sacral stage 4pressure injury 4.5cm x 2.5cm x 0.5cm, moist granular wound bed   palpable but not exposed bone no blistering, (+) serosanguinous drainage. No odor, erythema, increased warmth, tenderness, induration, fluctuance, nor crepitus.

## 2023-11-30 NOTE — PROGRESS NOTE ADULT - ASSESSMENT
73 year old female with respiratory failure s/p trach and PEG, sacral osteomyelitis.    Abdominal pain   now improved  the patient is nontender on exam.     Erratic nature of bowel movements  there are numerous factors ranging from debility, antibiotics, and tube feeds.   laxatives to be given only PRN; bowel movements will continue to be inconsistent due to the aforementioned factors.    PEG   some drainage and oozing of blood is expected from the hypergranulation tissue at the PEG site, and that barrier creams and frequent dressing changes may help.  above discussed in detail with patients daughter     The plan of care was discussed with the physician assistant and modifications were made to the notation where appropriate.   Differential diagnosis and plan of care discussed with patient after the evaluation  35 minutes spent on total encounter of which more than fifty percent of the encounter was spent counseling and/or coordinating care by the attending physician.    [facility]  Andrew Morgan M.D.   [address] [place] [address]  Office: [phone]  Cell: [phone]

## 2023-11-30 NOTE — PROGRESS NOTE ADULT - PROBLEM SELECTOR PLAN 6
- Continue home Prednisone regimen 5 mg daily   *fibromyalgia  - continue home Gabapentin 100mg daily  - continue home Fentanyl 25mcg Q72H patches  - c/w Lidocaine patch   - Oxycodone 2.5mg q6 hrs PRN (in addition to Tylenol PRN)

## 2023-11-30 NOTE — SPEECH LANGUAGE PATHOLOGY EVALUATION - H & P REVIEW
MAXX LOPEZ is a 72 y/o F with PMHx of T2DM, HTN, HLD, anemia, hypothyroidism, RA, fibromyalgia, remote cerebral aneurysm repair, acute hypercapnic respiratory failure now with tracheostomy, PEG tube, and bedbound (trach change 8/18, PEG change 8/14), sacral pressure ulcer, BIBEMS from home for 6 day hx of bleeding from the tracheostomy and PEG tube with associated abdominal pain. Was seen outpatient on 10/26 by critical care Dr. Zaki Gottlieb and apparently had cultures sent at that time. Patient also noted to have chronic sacral decubitous ulcer. Family endorsed one episode of fever, though was not febrile on evaluation. Daughter noted patient was recently experiencing auditory and visual hallucinations./yes

## 2023-11-30 NOTE — PROGRESS NOTE ADULT - SUBJECTIVE AND OBJECTIVE BOX
Patient is a 73y old  Female who presents with a chief complaint of Bleeding from tracheostomy and PEG tube (21 Nov 2023 08:15)    HPI:  74 y/o F with PMHx of T2DM, HTN, HLD, anemia, hypothyroidism, RA, fibromyalgia, remote cerebral aneurysm repair, acute hypercapnic respiratory failure now with tracheostomy, PEG tube, and bedbound (trach change 8/18, PEG change 8/14), sacral pressure ulcer, BIBEMS from home for 6 day hx of bleeding from the tracheostomy and PEG tube with associated abdominal pain. Patient still having regular bowel movements, last one occurred prior to ED arrival. Was seen outpatient on 10/26 by critical care Dr. Zaki Gottlieb and apparently had cultures sent at that time. Patient also noted to have chronic sacral decubitous ulcer. Family endorsed one episode of fever, though was not febrile on evaluation. Daughter noted patient was recently experiencing auditory and visual hallucinations (seeing animals that were not there & hearing a "bomb" going off outside her home), though patient not actively hallucinating on evaluation.  ED course notable for CT neck imaging showing no evidence of active bleeding around the tracheostomy site, no hematomas, and no drainable collection around the tracheostomy site. CT abdomen/pelvis notable for gastrostomy tube localized to the stomach with mild thickening noted along the tract; a sacral decubitus ulcer extending to the coccyx with osseous remodeling, possibly representing osteomyelitis; and bibasilar patchy opacities with volume loss in the lungs, representing atelectasis vs infection. Patient admitted for respiratory support, wound care of tracheostomy and PEG, and management of presumed sacral osteomyelitis.   (28 Oct 2023 04:18)    Interval Events:    REVIEW OF SYSTEMS:  [ ] Positive  [ ] All other systems negative  [ ] Unable to assess ROS because ________    Vital Signs Last 24 Hrs  T(C): 36.6 (11-30-23 @ 04:25), Max: 37.1 (11-29-23 @ 21:00)  T(F): 97.9 (11-30-23 @ 04:25), Max: 98.7 (11-29-23 @ 21:00)  HR: 84 (11-30-23 @ 05:18) (80 - 109)  BP: 163/83 (11-30-23 @ 04:25) (116/88 - 163/83)  RR: 20 (11-30-23 @ 04:25) (17 - 20)  SpO2: 99% (11-30-23 @ 05:18) (97% - 100%)    PHYSICAL EXAM:  HEENT:   [ ]Tracheostomy:  [ ]Pupils equal  [ ]No oral lesions  [ ]Abnormal    SKIN  [ ] No Rash  [ ] Abnormal  [ ] pressure    CARDIAC  [ ]Regular  [ ]Abnormal    PULMONARY  [ ]Bilateral Clear Breath Sounds  [ ]Normal Excursion  [ ]Abnormal    GI  [ ]PEG      [ ] +BS		              [ ]Soft, nondistended, nontender	  [ ]Abnormal    MUSCULOSKELETAL                                   [ ]Bedbound                 [ ]Abnormal    [ ]Ambulatory/OOB to chair                           EXTREMITIES                                         [ ]Normal  [ ]Edema                           NEUROLOGIC  [ ] Normal, non focal  [ ] Focal findings:    PSYCHIATRIC  [ ]Alert and appropriate  [ ] Sedated	 [ ]Agitated    :  Mcbride: [ ] Yes, if yes: Date of Placement:                   [  ] No    LINES: Central Lines [ ] Yes, if yes: Date of Placement                                     [  ] No    HOSPITAL MEDICATIONS:  MEDICATIONS  (STANDING):  albuterol/ipratropium for Nebulization 3 milliLiter(s) Nebulizer every 6 hours  amLODIPine   Tablet 10 milliGRAM(s) Oral daily  artificial  tears Solution 2 Drop(s) Both EYES four times a day  ascorbic acid 500 milliGRAM(s) Oral daily  calcium carbonate    500 mG (Tums) Chewable 1 Tablet(s) Chew every 12 hours  cephalexin 500 milliGRAM(s) Oral every 6 hours  chlorhexidine 0.12% Liquid 15 milliLiter(s) Oral Mucosa every 12 hours  chlorhexidine 4% Liquid 1 Application(s) Topical <User Schedule>  ciprofloxacin   IVPB 400 milliGRAM(s) IV Intermittent every 12 hours  dextrose 50% Injectable 12.5 Gram(s) IV Push once  dextrose 50% Injectable 25 Gram(s) IV Push once  dextrose 50% Injectable 50 milliLiter(s) IV Push once  dextrose 50% Injectable 25 Gram(s) IV Push once  dextrose Oral Gel 15 Gram(s) Oral once  escitalopram 10 milliGRAM(s) Oral <User Schedule>  gabapentin Solution 100 milliGRAM(s) Enteral Tube at bedtime  glucagon  Injectable 1 milliGRAM(s) IntraMuscular once  hemorrhoidal Ointment      hemorrhoidal Ointment 1 Application(s) Rectal daily  insulin glargine Injectable (LANTUS) 20 Unit(s) SubCutaneous every morning  insulin lispro (ADMELOG) corrective regimen sliding scale   SubCutaneous every 6 hours  insulin regular  human recombinant 17 Unit(s) SubCutaneous <User Schedule>  levothyroxine 125 MICROGram(s) Oral <User Schedule>  lidocaine   4% Patch 1 Patch Transdermal daily  melatonin 5 milliGRAM(s) Oral at bedtime  melatonin 1 milliGRAM(s) Oral at bedtime  multivitamin/minerals/iron Oral Solution (CENTRUM) 15 milliLiter(s) Oral daily  nystatin Powder 1 Application(s) Topical every 8 hours  oxybutynin 5 milliGRAM(s) Oral daily  pantoprazole   Suspension 40 milliGRAM(s) Enteral Tube <User Schedule>  petrolatum Ophthalmic Ointment 1 Application(s) Both EYES four times a day  predniSONE   Tablet 5 milliGRAM(s) Oral daily  QUEtiapine 12.5 milliGRAM(s) Oral <User Schedule>  simethicone 80 milliGRAM(s) Chew four times a day  sodium chloride 3%  Inhalation 4 milliLiter(s) Inhalation every 12 hours  zinc oxide 40% Paste 1 Application(s) Topical daily    MEDICATIONS  (PRN):  acetaminophen   Oral Liquid .. 1000 milliGRAM(s) Enteral Tube every 6 hours PRN Temp greater or equal to 38C (100.4F), Mild Pain (1 - 3)  benzocaine 20% Spray 1 Spray(s) Topical four times a day PRN Cough  diclofenac sodium 1% Gel 2 Gram(s) Topical four times a day PRN pain  diphenhydrAMINE Elixir 25 milliGRAM(s) Oral every 6 hours PRN Rash and/or Itching  guaifenesin/dextromethorphan Oral Liquid 10 milliLiter(s) Oral every 6 hours PRN Cough  oxyCODONE    Solution 2.5 milliGRAM(s) Oral every 6 hours PRN Moderate Pain (4 - 6)  sodium chloride 0.65% Nasal 1 Spray(s) Both Nostrils every 6 hours PRN Nasal Congestion      LABS:                        10.7   9.12  )-----------( 132      ( 30 Nov 2023 07:13 )             34.7     11-29    137  |  99  |  17  ----------------------------<  147<H>  4.1   |  28  |  <0.30<L>    Ca    9.3      29 Nov 2023 06:38  Phos  3.8     11-29  Mg     2.1     11-29    TPro  7.5  /  Alb  3.4  /  TBili  0.4  /  DBili  x   /  AST  25  /  ALT  23  /  AlkPhos  52  11-29      Urinalysis Basic - ( 29 Nov 2023 06:38 )    Color: x / Appearance: x / SG: x / pH: x  Gluc: 147 mg/dL / Ketone: x  / Bili: x / Urobili: x   Blood: x / Protein: x / Nitrite: x   Leuk Esterase: x / RBC: x / WBC x   Sq Epi: x / Non Sq Epi: x / Bacteria: x      Mode: AC/ CMV (Assist Control/ Continuous Mandatory Ventilation)  RR (machine): 121  TV (machine): 300  FiO2: 30  PEEP: 5  ITime: 1  MAP: 9  PIP: 29     Patient is a 73y old  Female who presents with a chief complaint of Bleeding from tracheostomy and PEG tube (21 Nov 2023 08:15)    HPI:  72 y/o F with PMHx of T2DM, HTN, HLD, anemia, hypothyroidism, RA, fibromyalgia, remote cerebral aneurysm repair, acute hypercapnic respiratory failure now with tracheostomy, PEG tube, and bedbound (trach change 8/18, PEG change 8/14), sacral pressure ulcer, BIBEMS from home for 6 day hx of bleeding from the tracheostomy and PEG tube with associated abdominal pain. Patient still having regular bowel movements, last one occurred prior to ED arrival. Was seen outpatient on 10/26 by critical care Dr. Zaki Gottlieb and apparently had cultures sent at that time. Patient also noted to have chronic sacral decubitous ulcer. Family endorsed one episode of fever, though was not febrile on evaluation. Daughter noted patient was recently experiencing auditory and visual hallucinations (seeing animals that were not there & hearing a "bomb" going off outside her home), though patient not actively hallucinating on evaluation.  ED course notable for CT neck imaging showing no evidence of active bleeding around the tracheostomy site, no hematomas, and no drainable collection around the tracheostomy site. CT abdomen/pelvis notable for gastrostomy tube localized to the stomach with mild thickening noted along the tract; a sacral decubitus ulcer extending to the coccyx with osseous remodeling, possibly representing osteomyelitis; and bibasilar patchy opacities with volume loss in the lungs, representing atelectasis vs infection. Patient admitted for respiratory support, wound care of tracheostomy and PEG, and management of presumed sacral osteomyelitis.   (28 Oct 2023 04:18)    Interval Events: No issues overnight    REVIEW OF SYSTEMS:  [x ] Positive; expressed need for more sleep.  [ ] All other systems negative  [ ] Unable to assess ROS because ________    Vital Signs Last 24 Hrs  T(C): 36.6 (11-30-23 @ 04:25), Max: 37.1 (11-29-23 @ 21:00)  T(F): 97.9 (11-30-23 @ 04:25), Max: 98.7 (11-29-23 @ 21:00)  HR: 84 (11-30-23 @ 05:18) (80 - 109)  BP: 163/83 (11-30-23 @ 04:25) (116/88 - 163/83)  RR: 20 (11-30-23 @ 04:25) (17 - 20)  SpO2: 99% (11-30-23 @ 05:18) (97% - 100%)    PHYSICAL EXAM:  HEENT:   [X]Tracheostomy: #8 distal XLT [place]iley  [X]Pupils equal  [ ]No oral lesions  [ ]Abnormal    SKIN  [ ]No Rash  [ ] Abnormal  [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    CARDIAC  [X]Regular  [ ]Abnormal    PULMONARY  [ ]Bilateral Clear Breath Sounds  [ ]Normal Excursion  [X]Abnormal - BL Coarse BS    GI  [X]PEG      [X] +BS		              [X]Soft, nondistended, nontender	  [ ]Abnormal    MUSCULOSKELETAL                                   [X]Bedbound                 [ ]Abnormal    [ ]Ambulatory/OOB to chair                           EXTREMITIES                                         [X]Normal  [ ]Edema                           NEUROLOGIC  [ ] Normal, non focal  [X] Focal findings: opens eyes spontaneously, follows commands on all 4 extremities    PSYCHIATRIC  [X]Alert and appropriate  [ ] Sedated	 [ ]Agitated    :  Mcbride: [ ] Yes, if yes: Date of Placement:                   [X] No    LINES: Central Lines [ ] Yes, if yes: Date of Placement                                     [X] No    HOSPITAL MEDICATIONS:  MEDICATIONS  (STANDING):  albuterol/ipratropium for Nebulization 3 milliLiter(s) Nebulizer every 6 hours  amLODIPine   Tablet 10 milliGRAM(s) Oral daily  artificial  tears Solution 2 Drop(s) Both EYES four times a day  ascorbic acid 500 milliGRAM(s) Oral daily  calcium carbonate    500 mG (Tums) Chewable 1 Tablet(s) Chew every 12 hours  cephalexin 500 milliGRAM(s) Oral every 6 hours  chlorhexidine 0.12% Liquid 15 milliLiter(s) Oral Mucosa every 12 hours  chlorhexidine 4% Liquid 1 Application(s) Topical <User Schedule>  ciprofloxacin   IVPB 400 milliGRAM(s) IV Intermittent every 12 hours  dextrose 50% Injectable 12.5 Gram(s) IV Push once  dextrose 50% Injectable 25 Gram(s) IV Push once  dextrose 50% Injectable 50 milliLiter(s) IV Push once  dextrose 50% Injectable 25 Gram(s) IV Push once  dextrose Oral Gel 15 Gram(s) Oral once  escitalopram 10 milliGRAM(s) Oral <User Schedule>  gabapentin Solution 100 milliGRAM(s) Enteral Tube at bedtime  glucagon  Injectable 1 milliGRAM(s) IntraMuscular once  hemorrhoidal Ointment      hemorrhoidal Ointment 1 Application(s) Rectal daily  insulin glargine Injectable (LANTUS) 20 Unit(s) SubCutaneous every morning  insulin lispro (ADMELOG) corrective regimen sliding scale   SubCutaneous every 6 hours  insulin regular  human recombinant 17 Unit(s) SubCutaneous <User Schedule>  levothyroxine 125 MICROGram(s) Oral <User Schedule>  lidocaine   4% Patch 1 Patch Transdermal daily  melatonin 5 milliGRAM(s) Oral at bedtime  melatonin 1 milliGRAM(s) Oral at bedtime  multivitamin/minerals/iron Oral Solution (CENTRUM) 15 milliLiter(s) Oral daily  nystatin Powder 1 Application(s) Topical every 8 hours  oxybutynin 5 milliGRAM(s) Oral daily  pantoprazole   Suspension 40 milliGRAM(s) Enteral Tube <User Schedule>  petrolatum Ophthalmic Ointment 1 Application(s) Both EYES four times a day  predniSONE   Tablet 5 milliGRAM(s) Oral daily  QUEtiapine 12.5 milliGRAM(s) Oral <User Schedule>  simethicone 80 milliGRAM(s) Chew four times a day  sodium chloride 3%  Inhalation 4 milliLiter(s) Inhalation every 12 hours  zinc oxide 40% Paste 1 Application(s) Topical daily    MEDICATIONS  (PRN):  acetaminophen   Oral Liquid .. 1000 milliGRAM(s) Enteral Tube every 6 hours PRN Temp greater or equal to 38C (100.4F), Mild Pain (1 - 3)  benzocaine 20% Spray 1 Spray(s) Topical four times a day PRN Cough  diclofenac sodium 1% Gel 2 Gram(s) Topical four times a day PRN pain  diphenhydrAMINE Elixir 25 milliGRAM(s) Oral every 6 hours PRN Rash and/or Itching  guaifenesin/dextromethorphan Oral Liquid 10 milliLiter(s) Oral every 6 hours PRN Cough  oxyCODONE    Solution 2.5 milliGRAM(s) Oral every 6 hours PRN Moderate Pain (4 - 6)  sodium chloride 0.65% Nasal 1 Spray(s) Both Nostrils every 6 hours PRN Nasal Congestion      LABS:                        10.7   9.12  )-----------( 132      ( 30 Nov 2023 07:13 )             34.7     11-29    137  |  99  |  17  ----------------------------<  147<H>  4.1   |  28  |  <0.30<L>    Ca    9.3      29 Nov 2023 06:38  Phos  3.8     11-29  Mg     2.1     11-29    TPro  7.5  /  Alb  3.4  /  TBili  0.4  /  DBili  x   /  AST  25  /  ALT  23  /  AlkPhos  52  11-29      Urinalysis Basic - ( 29 Nov 2023 06:38 )    Color: x / Appearance: x / SG: x / pH: x  Gluc: 147 mg/dL / Ketone: x  / Bili: x / Urobili: x   Blood: x / Protein: x / Nitrite: x   Leuk Esterase: x / RBC: x / WBC x   Sq Epi: x / Non Sq Epi: x / Bacteria: x      Mode: AC/ CMV (Assist Control/ Continuous Mandatory Ventilation)  RR (machine): 121  TV (machine): 300  FiO2: 30  PEEP: 5  ITime: 1  MAP: 9  PIP: 29

## 2023-11-30 NOTE — PROGRESS NOTE ADULT - ASSESSMENT
74 y/o F w/h/o T2DM with unknown control due to anemia of chronic disease skewing A1C levels. On Levemir and Humalog insulin PTA. Unknown DM complications. Also h/o HTN, HLD, anemia, hypothyroidism, RA, fibromyalgia, remote cerebral aneurysm repair, acute hypercapnic respiratory failure now with tracheostomy, PEG tube, and bedbound (trach change 8/18, PEG change 8/14), sacral pressure ulcer, BIBEMS from home for 6 day hx of bleeding from the tracheostomy and PEG tube with associated abdominal pain> now resolved. Endocrinology consulted for hyperglycemia. BG goal 100 to low 200s due to age and comorbidities and AMS.      Home medications: Levemir 14 units qhs, Humalog Moderate dose scale (4 units for bg 201-250, 6 units for bg 251-300 , 8 units 301-350) while on [employer] tube feeding 975 ML at home. Per EMR pt get 18h TFs at home          Type 2 diabetes mellitus with hyperglycemia, with long-term current use of insulin.   ·  Plan: Test BG q6h while on TFs/NPO  -fbg at goal c/wlantus to 20 units daily in am  -BG above goal peristently at noon, Adjust to Regular insulin 19units at 0600, 17 units at 12 noon/1800  PLEASE DO NOT HOLD. CAN GIVE LOWER DOSE IF CONCERN FOR HYPOGLYCEMIA.   -If BG remains difficult to achieve will consider NPH insulin regimen instead,    -C/w moderate admelog correction scale every 6 hours  DISCHARGE:  -Will be determined according to BG/insulin needs/TFs and steroid therapy at time of discharge.  -Present insulin regimen and TF timing difficult to follow as out pt. Pt has TFs until 3am and also is getting 3 different types ot insulin. Need to adjust TFs time and rate before switching to a more simple insulin regimen.   -Needs telemedicine as out pt due to bedbound status. Can follow at [facility] practice. [address]. Phone . Will send email closer to discharge  Make sure pt has Rx for all DM supplies and insulin.     Problem/Plan - 2:  ·  Problem: Hypothyroidism.   ·  Plan: -c/w levothyroxine 125mcg daily   TSH and free thyroxine wnl  Please make sure LT4 is given on an empty stomach at least one hour apart from other meds and food to ensure med absorption. DO NOT GIVE WITH VITAMINS/ANTACIDS  Noted pt getting LT4 at the time that TFs are started affecting med absorption.  - now starting TF at 0600 , LT4 to be given at 0500  Needs RD consult to assess timing of TFs administration.     Problem/Plan - 3:  ·  Problem: Secondary adrenal insufficiency.   ·  Plan: Due to chronic steroid use pt is at risk of tertiary AI  -c/w prednisone 5mg daily  -BP stable would not increase dose of prednisone at this time.     Problem/Plan - 4:  ·  Problem: HTN (hypertension).   ·  Plan: BP goal <130/80  Manage per primary team.     Problem/Plan - 5:  ·  Problem: Other hyperlipidemia.   ·  Plan: LDL goal <70 due to DM  Pt LDL NA  Order fasting lipid if not recently done  Consistent moderate intensity Statin if not contraindicated   Manage per primary team   F/u levels as out pt.        discussed with patient and primary team Mayra MARCOS  Contact via [employer] Teams during business hours  To reach covering provider access AMION via sunrise tools  For Urgent matters/after-hours/weekends/holidays please page endocrine fellow on call   For nonurgent matters please email [email or web address]    Please note that this patient may be followed by different provider tomorrow.  Notify endocrine 24 hours prior to discharge for final recommendations

## 2023-11-30 NOTE — PROGRESS NOTE ADULT - NS ATTEND AMEND GEN_ALL_CORE FT
73F with a h/o DM, HTN, HLD, anemia, hypothyroidism, RA, fibromyalgia, remote cerebral aneurysm with repair, hypercapnic respiratory failure s/p tracheostomy/PEG, bedbound, sacral pressure ulcer. Admitted w/ bleeding from tracheostomy and PEG w/ associated abdominal pain, recent hx of auditory/visual hallucinations. Since admission, no further bleeding noted from either trach or PEG. Imaging showed mild thickening along PEG tube tract and sacral ulcer extending to coccyx suggestive of OM.     # Osteomyelitis  # Chronic hypoxemic RF  # oropharyngeal dysphagia  # acute on chronic pain  # Trach/Peg bleeding  # Tracheitis with Pseudomonas and serratia  # Hx of hallucination, anxiety - particularly in setting of prior antibiotics    #Neuro - awake, alert, and interactive. moving all extremities  - Patient presently calm and following commands appropriately. Continue current medications  - Behavioral Health Follow-up as needed   - Sleep was a little better with increased dose of Melatonin (6mg) last night  #Cardiovascular - hemodynamically stable  - Monitor LUE swelling - elevate arm and ensure IV without issues  #Pulmonary - acute on chronic hypoxemic respiratory failure - on home vent after extensive hospitalizations over the prior year since being trached last December - PSV as able though required return to full vent support during rounds this AM for tachypnea  - Patient phonating while on vent this AM - receiving full volumes on vent  - ENT follow-up as needed - previously changed to 8XLT trach - daughter was upset about previous trach changes but presently patient ventilating in stable fashion on current settings  - Maintain O2 sat > 90%  - Daughter requesting home CO2 monitoring device as unable have ABGs done at home as patient has a history of CO2 increase off ventilator  - Continue hypersal for now  #Gastro - oropharyngeal dysphagia with PEG tube in place - ensure appropriate feeds with Nutrition  - No further diarrhea/loose BM today since stool softeners stopped  - GI evaluation and AXR noted from earlier this week - no signs of obstruction  - History of c. diff previously treated per daughter - if diarrhea recurs off stool softeners will need to repeat testing for this.   #Infectious Disease - sacral osteo based on MRI - wound care follow-up (per daughter had debridement in past at another hospitalization)  - On review of wound care notes there does appear to be some improvement in at least one dimension of wound from admission measurements. The patient has had this wound since a hospitalization in December and per daughter does not have chronic or multiple wounds but only this single wound  - Continue Cipro and Keflex ass per ID - with plan for 6 week course in total. Will continue Cipro in D5 despite hyperglycemia given improved absorption compared to PO per d/w ID and pharmacy  - Per daughter, patient has had prior multiple infections during hospitalizations including prior pseudomonas, MRSA, E faecalis, and Klebsiella  - Sputum now with MDR Pseudomonas - ID follow-up however would consider holding off targeting this as respiratory status presently stable and no increase in secretions  - Follow-up repeat blood cultures thus far negative. Urine culture not likely a true infection per d/w ID  - Daughter reports a recent dental abscess and being told by outpatient dentist that patient needed teeth extracted - Dental evaluation noted with no plans for intervention as inpatient.   - Leukocytosis resolved this AM - unclear cause for transient jump yesterday and patient afebrile  #Hem/Onc - prior cytopenias in setting of antibiotics per patient's daughter   - Required PRBC transfusion over the weekend for Hb < 8 but no clear signs of bleeding  - Hem/Onc follow-up noted - suspect an anemia of chronic disease  - Daughter told by a prior Hematologist that her Hb must remain above 8 - though unclear whether this was in relation to any particular finding or just clinical gestalt  #Pain - continue current pain control - in setting of fibromyalgia and pain from wound  - restarted Oxy after discussion with patient's daughter yesterday - improvement noted  #Derm - previously seen by derm for skin lesions - mid back with irritated seborrheic keratosis - outpatient follow-up  #Endo - DM2 - continue insulin and adjust as needed for goal FS < 180  - Endocrine follow-up for assistance with hyperglycemia management  - Continue Synthroid - TSH WNL  #DVT proph - SCD  - Prior prophylactic AC stopped after concern for prior bleeding    Prognosis guarded    I spoke with patient's daughter over the phone this AM during rounds for about 15 minutes. All questions answered. Will continue to work with CM team about discharge planning though at this time there are no accepting [facility] which seems to be a big barrier. IV antibiotics are going to be needed til about 12/10 per ID.   - [facility] team has notified us that they are unable to accept patient for outpatient care due to her level of needs.

## 2023-11-30 NOTE — PROGRESS NOTE ADULT - PROBLEM SELECTOR PLAN 7
- s/p Home Levemir 14 units in morning held on admission as noted w/ hypoglycemia   - Enteral feed changed to LaunchTrack diabetic formula - s/p Home Levemir 14 units in morning held on admission as noted w/ hypoglycemia   - Enteral feed changed to [employer] diabetic formula; glucose numbers stabilizing.

## 2023-11-30 NOTE — PROGRESS NOTE ADULT - PROBLEM SELECTOR PLAN 4
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   - Bowel regimen  - PEG Site appears macerated. Multifactorial, possible the PEG has been too tight at home.  - Peg loosened by GI at beside, no leakage or further intervention required   - recurrent RLQ abdominal pain and bleeding at the PEG site  - reevaluated by GI -- no bleeding at the PEG site  - no BM since 11/19 -- increased dose of Senna and increased Miralax to BID   - 11/28 frequent multiple BMs, senna and miralax d/c'd - GI evaluated  - Continue Simethicone, cont monitoring 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   - Bowel regimen  - PEG Site appears macerated. Multifactorial, possible the PEG has been too tight at home.  - Peg loosened by GI at beside, no leakage or further intervention required   - recurrent RLQ abdominal pain and bleeding at the PEG site  - reevaluated by GI -- no bleeding at the PEG site  - no BM since 11/19 -- increased dose of Senna and increased Miralax to BID   - 11/28 frequent multiple BMs, senna and Miralax d/c'd - GI evaluated  - Continue Simethicone.

## 2023-11-30 NOTE — SPEECH LANGUAGE PATHOLOGY EVALUATION - SLP DIAGNOSIS
Patient admitted for respiratory support, wound care of tracheostomy and PEG, and management of presumed sacral osteomyelitis.

## 2023-12-01 LAB
ALBUMIN SERPL ELPH-MCNC: 3.2 G/DL — LOW (ref 3.3–5)
ALBUMIN SERPL ELPH-MCNC: 3.2 G/DL — LOW (ref 3.3–5)
ALP SERPL-CCNC: 58 U/L — SIGNIFICANT CHANGE UP (ref 40–120)
ALP SERPL-CCNC: 58 U/L — SIGNIFICANT CHANGE UP (ref 40–120)
ALT FLD-CCNC: 28 U/L — SIGNIFICANT CHANGE UP (ref 10–45)
ALT FLD-CCNC: 28 U/L — SIGNIFICANT CHANGE UP (ref 10–45)
ANION GAP SERPL CALC-SCNC: 12 MMOL/L — SIGNIFICANT CHANGE UP (ref 5–17)
ANION GAP SERPL CALC-SCNC: 12 MMOL/L — SIGNIFICANT CHANGE UP (ref 5–17)
AST SERPL-CCNC: 27 U/L — SIGNIFICANT CHANGE UP (ref 10–40)
AST SERPL-CCNC: 27 U/L — SIGNIFICANT CHANGE UP (ref 10–40)
BILIRUB SERPL-MCNC: 0.5 MG/DL — SIGNIFICANT CHANGE UP (ref 0.2–1.2)
BILIRUB SERPL-MCNC: 0.5 MG/DL — SIGNIFICANT CHANGE UP (ref 0.2–1.2)
BUN SERPL-MCNC: 16 MG/DL — SIGNIFICANT CHANGE UP (ref 7–23)
BUN SERPL-MCNC: 16 MG/DL — SIGNIFICANT CHANGE UP (ref 7–23)
CALCIUM SERPL-MCNC: 9.1 MG/DL — SIGNIFICANT CHANGE UP (ref 8.4–10.5)
CALCIUM SERPL-MCNC: 9.1 MG/DL — SIGNIFICANT CHANGE UP (ref 8.4–10.5)
CHLORIDE SERPL-SCNC: 94 MMOL/L — LOW (ref 96–108)
CHLORIDE SERPL-SCNC: 94 MMOL/L — LOW (ref 96–108)
CO2 SERPL-SCNC: 24 MMOL/L — SIGNIFICANT CHANGE UP (ref 22–31)
CO2 SERPL-SCNC: 24 MMOL/L — SIGNIFICANT CHANGE UP (ref 22–31)
CREAT SERPL-MCNC: <0.3 MG/DL — LOW (ref 0.5–1.3)
CREAT SERPL-MCNC: <0.3 MG/DL — LOW (ref 0.5–1.3)
CULTURE RESULTS: SIGNIFICANT CHANGE UP
EGFR: 113 ML/MIN/1.73M2 — SIGNIFICANT CHANGE UP
EGFR: 113 ML/MIN/1.73M2 — SIGNIFICANT CHANGE UP
GLUCOSE BLDC GLUCOMTR-MCNC: 119 MG/DL — HIGH (ref 70–99)
GLUCOSE BLDC GLUCOMTR-MCNC: 119 MG/DL — HIGH (ref 70–99)
GLUCOSE BLDC GLUCOMTR-MCNC: 132 MG/DL — HIGH (ref 70–99)
GLUCOSE BLDC GLUCOMTR-MCNC: 132 MG/DL — HIGH (ref 70–99)
GLUCOSE BLDC GLUCOMTR-MCNC: 168 MG/DL — HIGH (ref 70–99)
GLUCOSE BLDC GLUCOMTR-MCNC: 168 MG/DL — HIGH (ref 70–99)
GLUCOSE BLDC GLUCOMTR-MCNC: 285 MG/DL — HIGH (ref 70–99)
GLUCOSE BLDC GLUCOMTR-MCNC: 285 MG/DL — HIGH (ref 70–99)
GLUCOSE SERPL-MCNC: 288 MG/DL — HIGH (ref 70–99)
GLUCOSE SERPL-MCNC: 288 MG/DL — HIGH (ref 70–99)
HCT VFR BLD CALC: 33 % — LOW (ref 34.5–45)
HCT VFR BLD CALC: 33 % — LOW (ref 34.5–45)
HGB BLD-MCNC: 10 G/DL — LOW (ref 11.5–15.5)
HGB BLD-MCNC: 10 G/DL — LOW (ref 11.5–15.5)
MAGNESIUM SERPL-MCNC: 1.8 MG/DL — SIGNIFICANT CHANGE UP (ref 1.6–2.6)
MAGNESIUM SERPL-MCNC: 1.8 MG/DL — SIGNIFICANT CHANGE UP (ref 1.6–2.6)
MCHC RBC-ENTMCNC: 27.8 PG — SIGNIFICANT CHANGE UP (ref 27–34)
MCHC RBC-ENTMCNC: 27.8 PG — SIGNIFICANT CHANGE UP (ref 27–34)
MCHC RBC-ENTMCNC: 30.3 GM/DL — LOW (ref 32–36)
MCHC RBC-ENTMCNC: 30.3 GM/DL — LOW (ref 32–36)
MCV RBC AUTO: 91.7 FL — SIGNIFICANT CHANGE UP (ref 80–100)
MCV RBC AUTO: 91.7 FL — SIGNIFICANT CHANGE UP (ref 80–100)
NRBC # BLD: 0 /100 WBCS — SIGNIFICANT CHANGE UP (ref 0–0)
NRBC # BLD: 0 /100 WBCS — SIGNIFICANT CHANGE UP (ref 0–0)
PHOSPHATE SERPL-MCNC: 4 MG/DL — SIGNIFICANT CHANGE UP (ref 2.5–4.5)
PHOSPHATE SERPL-MCNC: 4 MG/DL — SIGNIFICANT CHANGE UP (ref 2.5–4.5)
PLATELET # BLD AUTO: 119 K/UL — LOW (ref 150–400)
PLATELET # BLD AUTO: 119 K/UL — LOW (ref 150–400)
POTASSIUM SERPL-MCNC: 4.4 MMOL/L — SIGNIFICANT CHANGE UP (ref 3.5–5.3)
POTASSIUM SERPL-MCNC: 4.4 MMOL/L — SIGNIFICANT CHANGE UP (ref 3.5–5.3)
POTASSIUM SERPL-SCNC: 4.4 MMOL/L — SIGNIFICANT CHANGE UP (ref 3.5–5.3)
POTASSIUM SERPL-SCNC: 4.4 MMOL/L — SIGNIFICANT CHANGE UP (ref 3.5–5.3)
PROT SERPL-MCNC: 7.1 G/DL — SIGNIFICANT CHANGE UP (ref 6–8.3)
PROT SERPL-MCNC: 7.1 G/DL — SIGNIFICANT CHANGE UP (ref 6–8.3)
RBC # BLD: 3.6 M/UL — LOW (ref 3.8–5.2)
RBC # BLD: 3.6 M/UL — LOW (ref 3.8–5.2)
RBC # FLD: 16 % — HIGH (ref 10.3–14.5)
RBC # FLD: 16 % — HIGH (ref 10.3–14.5)
SODIUM SERPL-SCNC: 130 MMOL/L — LOW (ref 135–145)
SODIUM SERPL-SCNC: 130 MMOL/L — LOW (ref 135–145)
SPECIMEN SOURCE: SIGNIFICANT CHANGE UP
WBC # BLD: 12.56 K/UL — HIGH (ref 3.8–10.5)
WBC # BLD: 12.56 K/UL — HIGH (ref 3.8–10.5)
WBC # FLD AUTO: 12.56 K/UL — HIGH (ref 3.8–10.5)
WBC # FLD AUTO: 12.56 K/UL — HIGH (ref 3.8–10.5)

## 2023-12-01 PROCEDURE — 99233 SBSQ HOSP IP/OBS HIGH 50: CPT | Mod: 25

## 2023-12-01 PROCEDURE — 76700 US EXAM ABDOM COMPLETE: CPT | Mod: 26

## 2023-12-01 PROCEDURE — 99497 ADVNCD CARE PLAN 30 MIN: CPT

## 2023-12-01 PROCEDURE — 99232 SBSQ HOSP IP/OBS MODERATE 35: CPT

## 2023-12-01 RX ORDER — INSULIN HUMAN 100 [IU]/ML
15 INJECTION, SOLUTION SUBCUTANEOUS
Refills: 0 | Status: DISCONTINUED | OUTPATIENT
Start: 2023-12-01 | End: 2023-12-03

## 2023-12-01 RX ADMIN — INSULIN HUMAN 19 UNIT(S): 100 INJECTION, SOLUTION SUBCUTANEOUS at 06:12

## 2023-12-01 RX ADMIN — Medication 2: at 06:12

## 2023-12-01 RX ADMIN — LIDOCAINE 1 PATCH: 4 CREAM TOPICAL at 20:31

## 2023-12-01 RX ADMIN — Medication 5 MILLIGRAM(S): at 21:32

## 2023-12-01 RX ADMIN — PANTOPRAZOLE SODIUM 40 MILLIGRAM(S): 20 TABLET, DELAYED RELEASE ORAL at 05:50

## 2023-12-01 RX ADMIN — LIDOCAINE 1 PATCH: 4 CREAM TOPICAL at 11:01

## 2023-12-01 RX ADMIN — QUETIAPINE FUMARATE 12.5 MILLIGRAM(S): 200 TABLET, FILM COATED ORAL at 18:11

## 2023-12-01 RX ADMIN — Medication 15 MILLILITER(S): at 11:02

## 2023-12-01 RX ADMIN — INSULIN HUMAN 15 UNIT(S): 100 INJECTION, SOLUTION SUBCUTANEOUS at 18:12

## 2023-12-01 RX ADMIN — Medication 500 MILLIGRAM(S): at 18:10

## 2023-12-01 RX ADMIN — Medication 2 DROP(S): at 18:12

## 2023-12-01 RX ADMIN — GABAPENTIN 100 MILLIGRAM(S): 400 CAPSULE ORAL at 21:35

## 2023-12-01 RX ADMIN — Medication 3 MILLILITER(S): at 11:28

## 2023-12-01 RX ADMIN — Medication 3 MILLILITER(S): at 23:06

## 2023-12-01 RX ADMIN — CHLORHEXIDINE GLUCONATE 1 APPLICATION(S): 213 SOLUTION TOPICAL at 05:53

## 2023-12-01 RX ADMIN — Medication 3 MILLILITER(S): at 17:03

## 2023-12-01 RX ADMIN — SIMETHICONE 80 MILLIGRAM(S): 80 TABLET, CHEWABLE ORAL at 05:50

## 2023-12-01 RX ADMIN — SODIUM CHLORIDE 4 MILLILITER(S): 9 INJECTION INTRAMUSCULAR; INTRAVENOUS; SUBCUTANEOUS at 05:25

## 2023-12-01 RX ADMIN — Medication 5 MILLIGRAM(S): at 05:50

## 2023-12-01 RX ADMIN — ESCITALOPRAM OXALATE 10 MILLIGRAM(S): 10 TABLET, FILM COATED ORAL at 08:28

## 2023-12-01 RX ADMIN — Medication 125 MICROGRAM(S): at 05:14

## 2023-12-01 RX ADMIN — Medication 1 TABLET(S): at 05:50

## 2023-12-01 RX ADMIN — INSULIN GLARGINE 20 UNIT(S): 100 INJECTION, SOLUTION SUBCUTANEOUS at 08:22

## 2023-12-01 RX ADMIN — AMLODIPINE BESYLATE 10 MILLIGRAM(S): 2.5 TABLET ORAL at 05:50

## 2023-12-01 RX ADMIN — CHLORHEXIDINE GLUCONATE 15 MILLILITER(S): 213 SOLUTION TOPICAL at 05:51

## 2023-12-01 RX ADMIN — Medication 1 APPLICATION(S): at 18:13

## 2023-12-01 RX ADMIN — Medication 1 APPLICATION(S): at 11:04

## 2023-12-01 RX ADMIN — Medication 500 MILLIGRAM(S): at 11:02

## 2023-12-01 RX ADMIN — Medication 200 MILLIGRAM(S): at 18:11

## 2023-12-01 RX ADMIN — Medication 1 MILLIGRAM(S): at 21:34

## 2023-12-01 RX ADMIN — OXYCODONE HYDROCHLORIDE 2.5 MILLIGRAM(S): 5 TABLET ORAL at 18:40

## 2023-12-01 RX ADMIN — Medication 2 DROP(S): at 11:03

## 2023-12-01 RX ADMIN — NYSTATIN CREAM 1 APPLICATION(S): 100000 CREAM TOPICAL at 05:51

## 2023-12-01 RX ADMIN — SIMETHICONE 80 MILLIGRAM(S): 80 TABLET, CHEWABLE ORAL at 11:02

## 2023-12-01 RX ADMIN — OXYCODONE HYDROCHLORIDE 2.5 MILLIGRAM(S): 5 TABLET ORAL at 10:47

## 2023-12-01 RX ADMIN — Medication 5 MILLIGRAM(S): at 11:02

## 2023-12-01 RX ADMIN — PHENYLEPHRINE-SHARK LIVER OIL-MINERAL OIL-PETROLATUM RECTAL OINTMENT 1 APPLICATION(S): at 11:03

## 2023-12-01 RX ADMIN — SODIUM CHLORIDE 4 MILLILITER(S): 9 INJECTION INTRAMUSCULAR; INTRAVENOUS; SUBCUTANEOUS at 17:03

## 2023-12-01 RX ADMIN — OXYCODONE HYDROCHLORIDE 2.5 MILLIGRAM(S): 5 TABLET ORAL at 18:10

## 2023-12-01 RX ADMIN — Medication 1 APPLICATION(S): at 05:52

## 2023-12-01 RX ADMIN — Medication 3 MILLILITER(S): at 05:25

## 2023-12-01 RX ADMIN — Medication 500 MILLIGRAM(S): at 05:49

## 2023-12-01 RX ADMIN — CHLORHEXIDINE GLUCONATE 15 MILLILITER(S): 213 SOLUTION TOPICAL at 18:13

## 2023-12-01 RX ADMIN — Medication 6: at 13:29

## 2023-12-01 RX ADMIN — ZINC OXIDE 1 APPLICATION(S): 200 OINTMENT TOPICAL at 11:03

## 2023-12-01 RX ADMIN — OXYCODONE HYDROCHLORIDE 2.5 MILLIGRAM(S): 5 TABLET ORAL at 11:17

## 2023-12-01 RX ADMIN — Medication 1 TABLET(S): at 18:11

## 2023-12-01 RX ADMIN — Medication 2 DROP(S): at 05:52

## 2023-12-01 RX ADMIN — NYSTATIN CREAM 1 APPLICATION(S): 100000 CREAM TOPICAL at 13:32

## 2023-12-01 RX ADMIN — Medication 200 MILLIGRAM(S): at 05:51

## 2023-12-01 RX ADMIN — Medication 1 SPRAY(S): at 05:52

## 2023-12-01 RX ADMIN — NYSTATIN CREAM 1 APPLICATION(S): 100000 CREAM TOPICAL at 21:35

## 2023-12-01 RX ADMIN — INSULIN HUMAN 15 UNIT(S): 100 INJECTION, SOLUTION SUBCUTANEOUS at 13:31

## 2023-12-01 RX ADMIN — SIMETHICONE 80 MILLIGRAM(S): 80 TABLET, CHEWABLE ORAL at 18:11

## 2023-12-01 NOTE — PROGRESS NOTE ADULT - ASSESSMENT
73 year old female with respiratory failure s/p trach and PEG, sacral osteomyelitis.    Abdominal pain   RUQ tenderness on exam, check US abdomen.     Erratic nature of bowel movements  there are numerous factors ranging from debility, antibiotics, and tube feeds.   laxatives to be given only PRN; bowel movements will continue to be inconsistent due to the aforementioned factors.    PEG   functioning well    The plan of care was discussed with the physician assistant and modifications were made to the notation where appropriate.   Differential diagnosis and plan of care discussed with patient after the evaluation  35 minutes spent on total encounter of which more than fifty percent of the encounter was spent counseling and/or coordinating care by the attending physician.    [facility]  Andrew Morgan M.D.   [address], [place]  Office: [phone]  Cell: [phone]

## 2023-12-01 NOTE — PROGRESS NOTE ADULT - SUBJECTIVE AND OBJECTIVE BOX
Patient is a 73y old  Female who presents with a chief complaint of peg tube   DATE OF SERVICE: 12/1/2023      SUBJECTIVE / OVERNIGHT EVENTS: Afebrile.  no new events      MEDICATIONS  (STANDING):  albuterol/ipratropium for Nebulization 3 milliLiter(s) Nebulizer every 6 hours  artificial  tears Solution 2 Drop(s) Both EYES four times a day  ascorbic acid 500 milliGRAM(s) Oral daily  bacitracin   Ointment 1 Application(s) Topical two times a day  bisacodyl Suppository 10 milliGRAM(s) Rectal once  calcium carbonate    500 mG (Tums) Chewable 1 Tablet(s) Chew every 12 hours  cephalexin 500 milliGRAM(s) Oral every 6 hours  chlorhexidine 0.12% Liquid 15 milliLiter(s) Oral Mucosa every 12 hours  chlorhexidine 4% Liquid 1 Application(s) Topical <User Schedule>  ciprofloxacin     Tablet 500 milliGRAM(s) Oral every 12 hours  dextrose 50% Injectable 50 milliLiter(s) IV Push once  enoxaparin Injectable 40 milliGRAM(s) SubCutaneous every 24 hours  escitalopram 10 milliGRAM(s) Oral daily  fentaNYL   Patch  25 MICROgram(s)/Hr 1 Patch Transdermal every 72 hours  gabapentin Solution 100 milliGRAM(s) Enteral Tube at bedtime  insulin glargine Injectable (LANTUS) 14 Unit(s) SubCutaneous every morning  insulin lispro (ADMELOG) corrective regimen sliding scale   SubCutaneous every 6 hours  levothyroxine 125 MICROGram(s) Oral <User Schedule>  lidocaine   4% Patch 1 Patch Transdermal daily  melatonin 3 milliGRAM(s) Oral at bedtime  multivitamin/minerals/iron Oral Solution (CENTRUM) 15 milliLiter(s) Oral daily  nystatin Powder 1 Application(s) Topical every 8 hours  oxybutynin 5 milliGRAM(s) Oral daily  pantoprazole   Suspension 40 milliGRAM(s) Enteral Tube daily  petrolatum Ophthalmic Ointment 1 Application(s) Both EYES four times a day  predniSONE   Tablet 5 milliGRAM(s) Oral daily  QUEtiapine 12.5 milliGRAM(s) Oral <User Schedule>  QUEtiapine 12.5 milliGRAM(s) Oral <User Schedule>  senna Syrup 10 milliLiter(s) Oral at bedtime  simethicone 80 milliGRAM(s) Chew four times a day  sodium chloride 3%  Inhalation 4 milliLiter(s) Inhalation every 12 hours  triamcinolone 0.1% Ointment 1 Application(s) Topical two times a day  zinc oxide 40% Paste 1 Application(s) Topical daily  zinc sulfate 220 milliGRAM(s) Oral daily    MEDICATIONS  (PRN):  acetaminophen   Oral Liquid .. 1000 milliGRAM(s) Enteral Tube every 6 hours PRN Temp greater or equal to 38C (100.4F), Mild Pain (1 - 3)  benzocaine 20% Spray 1 Spray(s) Topical four times a day PRN Cough  diphenhydrAMINE Elixir 25 milliGRAM(s) Oral every 6 hours PRN Rash and/or Itching  oxyCODONE    Solution 2.5 milliGRAM(s) Oral every 6 hours PRN Moderate Pain (4 - 6)  sodium chloride 0.65% Nasal 1 Spray(s) Both Nostrils every 6 hours PRN Nasal Congestion          I&O's Summary    17 Nov 2023 07:01  -  18 Nov 2023 07:00  --------------------------------------------------------  IN: 1300 mL / OUT: 950 mL / NET: 350 mL    18 Nov 2023 07:01  -  18 Nov 2023 17:32  --------------------------------------------------------  IN: 0 mL / OUT: 400 mL / NET: -400 mL        PHYSICAL EXAM:  Vital Signs Last 24 Hrs  T(C): 37.2 (01 Dec 2023 04:27), Max: 37.2 (01 Dec 2023 04:27)  T(F): 99 (01 Dec 2023 04:27), Max: 99 (01 Dec 2023 04:27)  HR: 85 (01 Dec 2023 09:55) (80 - 95)  BP: 135/71 (01 Dec 2023 04:27) (126/58 - 136/72)  BP(mean): --  RR: 20 (01 Dec 2023 04:27) (20 - 20)  SpO2: 99% (01 Dec 2023 09:55) (98% - 100%)    Parameters below as of 01 Dec 2023 05:25  Patient On (Oxygen Delivery Method): ventilator      GENERAL: NAD, well-developed  HEAD:  Atraumatic, Normocephalic  EYES: EOMI, PERRLA, conjunctiva and sclera clear  NECK: Supple, No JVD. On [facility] vent with a tracheostomy  CHEST/LUNG: Clear to auscultation bilaterally; No wheeze  HEART: Regular rate and rhythm; No murmurs, rubs, or gallops  ABDOMEN: Soft, Nontender, Nondistended; Bowel sounds present  EXTREMITIES:  2+ Peripheral Pulses, No clubbing, cyanosis, or edema  PSYCH: AAOx3  NEUROLOGY: non-focal. Functionally quadriplegic  SKIN: Sacral decubitus dressed    LABS:                                 9.5    8.05  )-----------( 130      ( 28 Nov 2023 06:36 )             32.2                7.5    7.48  )-----------( 134      ( 26 Nov 2023 07:27 )             25.6                                    8.6    8.89  )-----------( 126      ( 21 Nov 2023 07:05 )             29.1                8.9    9.19  )-----------( 124      ( 18 Nov 2023 09:15 )             30.4     11-18    137  |  99  |  29<H>  ----------------------------<  224<H>  4.1   |  28  |  <0.30<L>    Ca    10.0      18 Nov 2023 09:15  Mg     1.8     11-18    TPro  7.4  /  Alb  3.3  /  TBili  0.3  /  DBili  x   /  AST  19  /  ALT  18  /  AlkPhos  48  11-18          Urinalysis Basic - ( 18 Nov 2023 09:15 )    Color: x / Appearance: x / SG: x / pH: x  Gluc: 224 mg/dL / Ketone: x  / Bili: x / Urobili: x   Blood: x / Protein: x / Nitrite: x   Leuk Esterase: x / RBC: x / WBC x   Sq Epi: x / Non Sq Epi: x / Bacteria: x        RADIOLOGY & ADDITIONAL TESTS:    Imaging Personally Reviewed:    Consultant(s) Notes Reviewed:      Care Discussed with Consultants/Other Providers:

## 2023-12-01 NOTE — PROGRESS NOTE ADULT - NS ATTEND AMEND GEN_ALL_CORE FT
73F with a h/o DM, HTN, HLD, anemia, hypothyroidism, RA, fibromyalgia, remote cerebral aneurysm with repair, hypercapnic respiratory failure s/p tracheostomy/PEG, bedbound, sacral pressure ulcer. Admitted w/ bleeding from tracheostomy and PEG w/ associated abdominal pain, recent hx of auditory/visual hallucinations. Since admission, no further bleeding noted from either trach or PEG. Imaging showed mild thickening along PEG tube tract and sacral ulcer extending to coccyx suggestive of OM.     # Osteomyelitis  # Chronic hypoxemic RF  # oropharyngeal dysphagia  # acute on chronic pain  # Trach/Peg bleeding  # Tracheitis with Pseudomonas and serratia  # Hx of hallucination, anxiety - particularly in setting of prior antibiotics    #Neuro - awake, alert, and interactive. moving all extremities  - Patient presently calm and following commands appropriately. Continue current medications  - Behavioral Health Follow-up as needed   - Sleep remains poor - increased dose of Melatonin (6mg) last night  #Cardiovascular - hemodynamically stable  #Pulmonary - acute on chronic hypoxemic respiratory failure - on home vent after extensive hospitalizations over the prior year since being trached last December - PSV as able though still requiring high support  - Patient phonating while on vent this AM - receiving full volumes on vent  - ENT follow-up as needed - previously changed to 8XLT trach - daughter was upset about previous trach changes but presently patient ventilating in stable fashion on current settings  - Maintain O2 sat > 90%  - Daughter requesting home CO2 monitoring device as unable have ABGs done at home as patient has a history of CO2 increase off ventilator  - Continue hypersal for now  #Gastro - oropharyngeal dysphagia with PEG tube in place - ensure appropriate feeds with Nutrition  - No further diarrhea/loose BM today since stool softeners stopped  - GI evaluation and AXR noted from earlier this week - no signs of obstruction  - More abdominal pain today which is not resolving with gas - therefore will check abd US per d/w GI. Repeat LFTs today.  - History of c. diff previously treated per daughter - if diarrhea recurs off stool softeners will need to repeat testing for this.   #Infectious Disease - sacral osteo based on MRI - wound care follow-up (per daughter had debridement in past at another hospitalization)  - On review of wound care notes there does appear to be some improvement in at least one dimension of wound from admission measurements. The patient has had this wound since a hospitalization in December and per daughter does not have chronic or multiple wounds but only this single wound. Unclear if wound culture is needed will f/u with wound team.  - Continue Cipro and Keflex ass per ID - with plan for 6 week course in total. Will continue Cipro in D5 despite hyperglycemia given improved absorption compared to PO per d/w ID and pharmacy  - Per daughter, patient has had prior multiple infections during hospitalizations including prior pseudomonas, MRSA, E faecalis, and Klebsiella  - Sputum now with MDR Pseudomonas - ID follow-up however would consider holding off targeting this as respiratory status presently stable and no increase in secretions  - Follow-up repeat blood cultures thus far negative. Urine culture (VRE) not likely a true infection per d/w ID  - Daughter reports a recent dental abscess and being told by outpatient dentist that patient needed teeth extracted - Dental evaluation noted with no plans for intervention as inpatient.   - Leukocytosis waxing/waning last few days but no fevers and clinically patient looks improved. Will continue to monitor  #Hem/Onc - prior cytopenias in setting of antibiotics per patient's daughter   - Required PRBC transfusion over the weekend for Hb < 8 but no clear signs of bleeding  - Hem/Onc follow-up noted - suspect an anemia of chronic disease  - Daughter told by a prior Hematologist that her Hb must remain above 8 - though unclear whether this was in relation to any particular finding or just clinical gestalt  #Pain - continue current pain control - in setting of fibromyalgia and pain from wound  - restarted Oxy after discussion with patient's daughter yesterday - improvement noted  #Derm - previously seen by derm for skin lesions - mid back with irritated seborrheic keratosis - outpatient follow-up  #Endo - DM2 - continue insulin and adjust as needed for goal FS < 180  - Endocrine follow-up for assistance with hyperglycemia management  - Continue Synthroid - TSH WNL  #DVT proph - SCD  - Prior prophylactic AC stopped after concern for prior bleeding    Prognosis guarded    I spoke with patient's daughter over the phone this AM during rounds for about 12 minutes. All questions answered. Will continue to work with CM team about discharge planning though at this time there are no accepting [facility] which seems to be a big barrier. IV antibiotics are going to be needed til about 12/10 per ID.   - [facility] team has notified us that they are unable to accept patient for outpatient care due to her level of needs.  I also spoke with patient's daughter a second time in the afternoon to follow-up some earlier discussions. She shares with me that the patient was previously DNR/DNI and she had filled out these forms herself around 2001. She feels that her mother did not want to "sign" the DNR forms here because she was concerned that she may not receive medications or treatments though this was discussed with her earlier by the team that she would still receive all medical treatments. She shared some confliction about mom's long term status and care and wanted reassurance that her mother was not suffering but given that her mom is awake and interactive she would find it hard to "pull the plug". Hopefully the patient's mental status will continue to improve and pain improve to a point where she is able to more fully engage in these discussions in the future. At the present time the patient remains full code. The patient's daughter seems to be amenable to a palliative care evaluation. ACP 18 min over the phone separate from clinical time.

## 2023-12-01 NOTE — PROGRESS NOTE ADULT - SUBJECTIVE AND OBJECTIVE BOX
seen earlier today     Chief Complaint: Diabetes Mellitus follow up    INTERVAL HX: tf running as ordered, discussed plan of care with daughter trisha over phone , family at bedside  received regular insulin 8 unit x1 at 1800 as BG was tightly controlled , pending abdominal ultrasound to eval abd discomfort      Review of Systems:  General: As above  GI: No nausea, vomiting  Endocrine: no  S&Sx of hypoglycemia    Allergies    penicillin (Unknown)  heparin (Unknown)  Tagamet (Unknown)  pineapple (Unknown)  walnut (Unknown)  Pecan, Filbert, Hazelnut (Unknown)  Lyrica (Unknown)    Intolerances      MEDICATIONS  (STANDING):  albuterol/ipratropium for Nebulization 3 milliLiter(s) Nebulizer every 6 hours  amLODIPine   Tablet 10 milliGRAM(s) Oral daily  artificial  tears Solution 2 Drop(s) Both EYES four times a day  ascorbic acid 500 milliGRAM(s) Oral daily  calcium carbonate    500 mG (Tums) Chewable 1 Tablet(s) Chew every 12 hours  cephalexin 500 milliGRAM(s) Oral every 6 hours  chlorhexidine 0.12% Liquid 15 milliLiter(s) Oral Mucosa every 12 hours  chlorhexidine 4% Liquid 1 Application(s) Topical <User Schedule>  ciprofloxacin   IVPB 400 milliGRAM(s) IV Intermittent every 12 hours  dextrose 50% Injectable 12.5 Gram(s) IV Push once  dextrose 50% Injectable 25 Gram(s) IV Push once  dextrose 50% Injectable 25 Gram(s) IV Push once  dextrose 50% Injectable 50 milliLiter(s) IV Push once  dextrose Oral Gel 15 Gram(s) Oral once  escitalopram 10 milliGRAM(s) Oral <User Schedule>  gabapentin Solution 100 milliGRAM(s) Enteral Tube at bedtime  glucagon  Injectable 1 milliGRAM(s) IntraMuscular once  hemorrhoidal Ointment 1 Application(s) Rectal daily  hemorrhoidal Ointment      insulin glargine Injectable (LANTUS) 20 Unit(s) SubCutaneous every morning  insulin lispro (ADMELOG) corrective regimen sliding scale   SubCutaneous every 6 hours  insulin regular  human recombinant 19 Unit(s) SubCutaneous <User Schedule>  insulin regular  human recombinant 15 Unit(s) SubCutaneous <User Schedule>  levothyroxine 125 MICROGram(s) Oral <User Schedule>  lidocaine   4% Patch 1 Patch Transdermal daily  melatonin 5 milliGRAM(s) Oral at bedtime  melatonin 1 milliGRAM(s) Oral at bedtime  multivitamin/minerals/iron Oral Solution (CENTRUM) 15 milliLiter(s) Oral daily  nystatin Powder 1 Application(s) Topical every 8 hours  oxybutynin 5 milliGRAM(s) Oral daily  pantoprazole   Suspension 40 milliGRAM(s) Enteral Tube <User Schedule>  petrolatum Ophthalmic Ointment 1 Application(s) Both EYES four times a day  predniSONE   Tablet 5 milliGRAM(s) Oral daily  QUEtiapine 12.5 milliGRAM(s) Oral <User Schedule>  simethicone 80 milliGRAM(s) Chew four times a day  sodium chloride 3%  Inhalation 4 milliLiter(s) Inhalation every 12 hours  zinc oxide 40% Paste 1 Application(s) Topical daily        insulin glargine Injectable (LANTUS)   20 Unit(s) SubCutaneous (12-01-23 @ 08:22)    insulin lispro (ADMELOG) corrective regimen sliding scale   6 Unit(s) SubCutaneous (12-01-23 @ 13:29)   2 Unit(s) SubCutaneous (12-01-23 @ 06:12)    insulin regular  human recombinant   15 Unit(s) SubCutaneous (12-01-23 @ 13:31)    insulin regular  human recombinant.   8 Unit(s) SubCutaneous (11-30-23 @ 18:22)    levothyroxine   125 MICROGram(s) Oral (12-01-23 @ 05:14)    predniSONE   Tablet   5 milliGRAM(s) Oral (12-01-23 @ 05:50)        PHYSICAL EXAM:  VITALS: T(C): 37.3 (12-01-23 @ 13:36)  T(F): 99.2 (12-01-23 @ 13:36), Max: 99.2 (12-01-23 @ 13:36)  HR: 85 (12-01-23 @ 13:36) (80 - 95)  BP: 126/62 (12-01-23 @ 13:36) (126/58 - 135/71)  RR:  (18 - 20)  SpO2:  (99% - 100%)  Wt(kg): --  GENERAL: NAD, peg with TF running,   Respiratory: Respirations unlabored trach to vent   Extremities: Warm, no edema  NEURO: Alert      LABS:  POCT Blood Glucose.: 285 mg/dL (12-01-23 @ 12:13)  POCT Blood Glucose.: 168 mg/dL (12-01-23 @ 06:00)  POCT Blood Glucose.: 147 mg/dL (11-30-23 @ 23:40)  POCT Blood Glucose.: 118 mg/dL (11-30-23 @ 17:22)  POCT Blood Glucose.: 247 mg/dL (11-30-23 @ 11:39)  POCT Blood Glucose.: 141 mg/dL (11-30-23 @ 05:31)  POCT Blood Glucose.: 118 mg/dL (11-29-23 @ 23:59)  POCT Blood Glucose.: 164 mg/dL (11-29-23 @ 17:38)  POCT Blood Glucose.: 206 mg/dL (11-29-23 @ 12:40)  POCT Blood Glucose.: 138 mg/dL (11-29-23 @ 06:04)  POCT Blood Glucose.: 94 mg/dL (11-29-23 @ 00:02)  POCT Blood Glucose.: 187 mg/dL (11-28-23 @ 17:29)                          10.0   12.56 )-----------( 119      ( 01 Dec 2023 12:18 )             33.0     12-01    130<L>  |  94<L>  |  16  ----------------------------<  288<H>  4.4   |  24  |  <0.30<L>    Ca    9.1      01 Dec 2023 12:18  Phos  4.0     12-01  Mg     1.8     12-01    TPro  7.1  /  Alb  3.2<L>  /  TBili  0.5  /  DBili  x   /  AST  27  /  ALT  28  /  AlkPhos  58  12-01    eGFR: 113 mL/min/1.73m2 (01 Dec 2023 12:18)    11-25 Chol -- Direct LDL -- LDL calculated -- HDL -- Trig 192<H>  Thyroid Function Tests:  11-28 @ 06:37 TSH 2.80 FreeT4 1.2 T3 -- Anti TPO -- Anti Thyroglobulin Ab -- TSI --  11-10 @ 06:26 TSH 7.90 FreeT4 0.7 T3 -- Anti TPO -- Anti Thyroglobulin Ab -- TSI --      A1C with Estimated Average Glucose Result: 7.5 % (10-28-23 @ 07:18)    Estimated Average Glucose: 169 mg/dL (10-28-23 @ 07:18)        Diet, NPO with Tube Feed:   Tube Feeding Modality: Gastrostomy  [facility] glucose support 1.2  Total Volume for 24 Hours (mL): 1200  Continuous  Starting Tube Feed Rate mL per Hour: 60  Increase Tube Feed Rate by (mL): 0  Until Goal Tube Feed Rate (mL per Hour): 60  Tube Feed Duration (in Hours): 20  Tube Feed Start Time: 06:00  Tube Feed Stop Time: 02:00  Free Water Flush  Pump   Rate (mL per Hour): 10   Frequency: Every 2 Hours  Alfred(7 Gm Arginine/7 Gm Glut/1.2 Gm HMB     Qty per Day:  2 (11-29-23 @ 14:12) [Active]

## 2023-12-01 NOTE — PROGRESS NOTE ADULT - ASSESSMENT
74 y/o F w/h/o T2DM with unknown control due to anemia of chronic disease skewing A1C levels. On Levemir and Humalog insulin PTA. Unknown DM complications. Also h/o HTN, HLD, anemia, hypothyroidism, RA, fibromyalgia, remote cerebral aneurysm repair, acute hypercapnic respiratory failure now with tracheostomy, PEG tube, and bedbound (trach change 8/18, PEG change 8/14), sacral pressure ulcer, BIBEMS from home for 6 day hx of bleeding from the tracheostomy and PEG tube with associated abdominal pain> now resolved. Endocrinology consulted for hyperglycemia. BG goal 100 to low 200s due to age and comorbidities and AMS. pending abd US to eval abd discomfort      Home medications: Levemir 14 units qhs, Humalog Moderate dose scale (4 units for bg 201-250, 6 units for bg 251-300 , 8 units 301-350) while on [employer] tube feeding 975 ML at home. Per EMR pt get 18h TFs at home          Type 2 diabetes mellitus with hyperglycemia, with long-term current use of insulin.   ·  Plan:   - on 20 hour TF [employer] [id number] 60mL /hr  -Test BG q6h while on TFs/NPO  -fbg at goal continue with lantus to 20 units daily in am  -BG at goal 0600/1800/0000  -BG above goal persistently at noon, however 0600am dose not signed for on eMAR this am unclear at this time if patient received dose this am ; continue with  Regular insulin 19 units at 0600,  Adjust to 15 units at 12 noon & 1800  - HOLD IF  TF ARE HELD. CAN GIVE LOWER DOSE IF CONCERN FOR HYPOGLYCEMIA.   -If BG remains difficult to achieve will consider NPH insulin regimen instead,    -C/w moderate admelog correction scale every 6 hours  DISCHARGE:  -Will be determined according to BG/insulin needs/TFs and steroid therapy at time of discharge.  -Present insulin regimen and TF timing difficult to follow as out pt. Pt has TFs until 3am and also is getting 3 different types ot insulin. Need to adjust TFs time and rate before switching to a more simple insulin regimen.   -Needs telemedicine as out pt due to bedbound status. Can follow at [facility] [place], [facility]. [address] [place] [address] [address]. Phone . Will send email closer to discharge  Make sure pt has Rx for all DM supplies and insulin.     Problem/Plan - 2:  ·  Problem: Hypothyroidism.   ·  Plan: -c/w levothyroxine 125mcg daily   TSH and free thyroxine wnl  Please make sure LT4 is given on an empty stomach at least one hour apart from other meds and food to ensure med absorption. DO NOT GIVE WITH VITAMINS/ANTACIDS  Noted pt getting LT4 at the time that TFs are started affecting med absorption.  - now starting TF at 0600 , LT4 to be given at 0500  Needs RD consult to assess timing of TFs administration.     Problem/Plan - 3:  ·  Problem: Secondary adrenal insufficiency.   ·  Plan: Due to chronic steroid use pt is at risk of tertiary AI  -c/w prednisone 5mg daily  -BP stable would not increase dose of prednisone at this time.     Problem/Plan - 4:  ·  Problem: HTN (hypertension).   ·  Plan: BP goal <130/80  Manage per primary team.     Problem/Plan - 5:  ·  Problem: Other hyperlipidemia.   ·  Plan: LDL goal <70 due to DM  Pt LDL NA  Order fasting lipid if not recently done  Consistent moderate intensity Statin if not contraindicated   Manage per primary team   F/u levels as out pt.        discussed with patient and RN at bedside  Contact via [employer] Teams during business hours  To reach covering provider access AMION via sunrise tools  For Urgent matters/after-hours/weekends/holidays please page endocrine fellow on call   For nonurgent matters please email [email or web address]    Please note that this patient may be followed by different provider tomorrow.  Notify endocrine 24 hours prior to discharge for final recommendations

## 2023-12-01 NOTE — PROGRESS NOTE ADULT - ASSESSMENT
72 y/o F with PMHx of T2DM, HTN, HLD, anemia, hypothyroidism, RA, fibromyalgia, remote cerebral aneurysm repair, acute hypercapnic respiratory failure now with tracheostomy, PEG tube, and bedbound (trach change 8/18, PEG change 8/14), sacral pressure ulcer, BIBEMS from home 10/28/23for 6 day hx of bleeding from the tracheostomy and PEG tube with associated abdominal pain.   Seen for thrombocytopenia. She has had a mild thrombocytopenia essentially the whole admission and it was present on admission and was actually noted in 2020 also . This could be due to medications such as Escitalopram. Could be ITP. Could be other etiologies like infection as well. No splenomegaly. She was getting Lovenox prior and it was stopped and can be given again.  HIT negative. Since it is safe and stable, no intervention needed except observation.   Has anemia, Direct Coomb's positive but no clinical hemolysis by LDH and haptoglobin.  Creatinine not elevated. No evidence of iron and B12 def.  Likely is  AOCD process. Management is supportive, At  present hb and platelets stable. WBC little higher today, likely reactive  ID, pulmonary, wound care, GI etc following

## 2023-12-01 NOTE — PROGRESS NOTE ADULT - ASSESSMENT
74 y/o F with PMHx of T2DM, HTN, HLD, anemia, hypothyroidism, RA, fibromyalgia, remote cerebral aneurysm repair, acute hypercapnic respiratory failure now with tracheostomy, PEG tube, and bedbound (trach change 8/18, PEG change 8/14), sacral pressure ulcer, BIBEMS from home for 6 day hx of bleeding from the tracheostomy and PEG tube with associated abdominal pain, recent history of auditory and visual hallucinations, and with chronic sacral decubitus ulcer. Exam notable for mild abdominal distention with LLQ and RLQ tenderness, tracheostomy in place with no obvious bleeding, PEG tube with scant amount of dried blood, at baseline neurologic status. Imaging showing no active bleeding around tracheostomy site, however was notable for mild thickening along PEG tube tract, sacral ulcer extending to the coccyx suggestive of possible osteomyelitis, and bibasilar patchy lung opacities with volume loss. Patient admitted for respiratory support, wound care of tracheostomy and PEG, and management of sacral osteomyelitis. No further Trach and peg site  bleeding noted since admission.  Pelvic MRI imaging also suggestive of Acute OM. Patient placed on long-term antibiotics with Infectious disease guidance.  Additionally treated with a 7d course of Cefepime due to PSA and Serratia tracheitis on 11/8-->11/15 and then resumed cipro/keflex.  Hospital course c/b Delirium for which Seroquel was added and home Lexapro continued. Tracheostomy changed to #9 Cuffed Port[place] by ENT 11/3.  Despite trach change patient remained with persistent air leak.  On 11/19, the trach was again changed due to leak and loss of volumes on vent.  Trach was changed to #8 distal cuffed Shiley.  Patient seen by Dermatology for back lesions.  One diagnosed as a seborrheic keratitis and the other a dermatofibroma.       11/30: No issues 74 y/o F with PMHx of T2DM, HTN, HLD, anemia, hypothyroidism, RA, fibromyalgia, remote cerebral aneurysm repair, acute hypercapnic respiratory failure now with tracheostomy, PEG tube, and bedbound (trach change 8/18, PEG change 8/14), sacral pressure ulcer, BIBEMS from home for 6 day hx of bleeding from the tracheostomy and PEG tube with associated abdominal pain, recent history of auditory and visual hallucinations, and with chronic sacral decubitus ulcer. Exam notable for mild abdominal distention with LLQ and RLQ tenderness, tracheostomy in place with no obvious bleeding, PEG tube with scant amount of dried blood, at baseline neurologic status. Imaging showing no active bleeding around tracheostomy site, however was notable for mild thickening along PEG tube tract, sacral ulcer extending to the coccyx suggestive of possible osteomyelitis, and bibasilar patchy lung opacities with volume loss. Patient admitted for respiratory support, wound care of tracheostomy and PEG, and management of sacral osteomyelitis. No further Trach and peg site  bleeding noted since admission.  Pelvic MRI imaging also suggestive of Acute OM. Patient placed on long-term antibiotics with Infectious disease guidance.  Additionally treated with a 7d course of Cefepime due to PSA and Serratia tracheitis on 11/8-->11/15 and then resumed cipro/keflex.  Hospital course c/b Delirium for which Seroquel was added and home Lexapro continued. Tracheostomy changed to #9 Cuffed Portex by ENT 11/3.  Despite trach change patient remained with persistent air leak.  On 11/19, the trach was again changed due to leak and loss of volumes on vent.  Trach was changed to #8 distal cuffed Shiley.  Patient seen by Dermatology for back lesions.  One diagnosed as a seborrheic keratitis and the other a dermatofibroma.     11/30: No issues  12/1: only on pressure support for a short time (18/5). Will obtain abdominal ultrasound to better evaluate abdominal pain

## 2023-12-01 NOTE — PROGRESS NOTE ADULT - PROBLEM SELECTOR PLAN 2
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   - S/p CT abd/pelvis (10/28): Sacral decubitus ulcer extending to the coccyx with osseous remodeling and pelvic MRI or bone scan to recc to exclude osteomyelitis.  - 10/30 wound care note: Sacral stage 4 pressure injury 4.5cm x 2.5cm x 1.5cm w/ lip of undermining circumferentially greatest 9-12 of 3cm.  - MRI pelvis 10/30 with Osteomyelitis, c/w current Cefepime for 7 days, Vancomycin d/marli   - Elevated, ESR 85   - Elevated,    - Wound care on board  - 11/10: resumed Cipro 500mg q 12 and Keflex 500mg q 6 until 12/10.  - 11/20: Changed to IV Cipro 400 q12 as recommended by ID pharmacist due to multiple drug interactions when given via PEG  - 11/28 wound care note: Sacral stage 4pressure injury 4.5cm x 2.5cm x 0.5cm, moist granular wound bed   palpable but not exposed bone no blistering, (+) serosanguinous drainage. No odor, erythema, increased warmth, tenderness, induration, fluctuance, nor crepitus.

## 2023-12-01 NOTE — PROGRESS NOTE ADULT - PROBLEM SELECTOR PLAN 7
- s/p Home Levemir 14 units in morning held on admission as noted w/ hypoglycemia   - Enteral feed changed to Pelikon diabetic formula; glucose numbers stabilizing.

## 2023-12-01 NOTE — PROGRESS NOTE ADULT - SUBJECTIVE AND OBJECTIVE BOX
Patient is a 73y old  Female who presents with a chief complaint of Bleeding from tracheostomy and PEG tube (21 Nov 2023 08:15)    HPI:  72 y/o F with PMHx of T2DM, HTN, HLD, anemia, hypothyroidism, RA, fibromyalgia, remote cerebral aneurysm repair, acute hypercapnic respiratory failure now with tracheostomy, PEG tube, and bedbound (trach change 8/18, PEG change 8/14), sacral pressure ulcer, BIBEMS from home for 6 day hx of bleeding from the tracheostomy and PEG tube with associated abdominal pain. Patient still having regular bowel movements, last one occurred prior to ED arrival. Was seen outpatient on 10/26 by critical care Dr. Zaki Gottlieb and apparently had cultures sent at that time. Patient also noted to have chronic sacral decubitous ulcer. Family endorsed one episode of fever, though was not febrile on evaluation. Daughter noted patient was recently experiencing auditory and visual hallucinations (seeing animals that were not there & hearing a "bomb" going off outside her home), though patient not actively hallucinating on evaluation.  ED course notable for CT neck imaging showing no evidence of active bleeding around the tracheostomy site, no hematomas, and no drainable collection around the tracheostomy site. CT abdomen/pelvis notable for gastrostomy tube localized to the stomach with mild thickening noted along the tract; a sacral decubitus ulcer extending to the coccyx with osseous remodeling, possibly representing osteomyelitis; and bibasilar patchy opacities with volume loss in the lungs, representing atelectasis vs infection. Patient admitted for respiratory support, wound care of tracheostomy and PEG, and management of presumed sacral osteomyelitis.   (28 Oct 2023 04:18)    Interval Events: no overnight events     REVIEW OF SYSTEMS:  [ ] Positive  [ ] All other systems negative  [ ] Unable to assess ROS because ________    Vital Signs Last 24 Hrs  T(C): 37.2 (12-01-23 @ 04:27), Max: 37.2 (12-01-23 @ 04:27)  T(F): 99 (12-01-23 @ 04:27), Max: 99 (12-01-23 @ 04:27)  HR: 91 (12-01-23 @ 05:25) (80 - 99)  BP: 135/71 (12-01-23 @ 04:27) (126/58 - 136/72)  RR: 20 (12-01-23 @ 04:27) (20 - 20)  SpO2: 99% (12-01-23 @ 05:25) (98% - 100%)    PHYSICAL EXAM:  HEENT:   [X]Tracheostomy: #8 distal XLT Shiley  [X]Pupils equal  [ ]No oral lesions  [ ]Abnormal    SKIN  [ ]No Rash  [ ] Abnormal  [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    CARDIAC  [X]Regular  [ ]Abnormal    PULMONARY  [ ]Bilateral Clear Breath Sounds  [ ]Normal Excursion  [X]Abnormal - BL Coarse BS    GI  [X]PEG      [X] +BS		              [X]Soft, nondistended, nontender	  [ ]Abnormal    MUSCULOSKELETAL                                   [X]Bedbound                 [ ]Abnormal    [ ]Ambulatory/OOB to chair                           EXTREMITIES                                         [X]Normal  [ ]Edema                           NEUROLOGIC  [ ] Normal, non focal  [X] Focal findings: opens eyes spontaneously, follows commands on all 4 extremities    PSYCHIATRIC  [X]Alert and appropriate  [ ] Sedated	 [ ]Agitated    :  Mcbride: [ ] Yes, if yes: Date of Placement:                   [X] No    LINES: Central Lines [ ] Yes, if yes: Date of Placement                                     [X] No    HOSPITAL MEDICATIONS:  MEDICATIONS  (STANDING):  albuterol/ipratropium for Nebulization 3 milliLiter(s) Nebulizer every 6 hours  amLODIPine   Tablet 10 milliGRAM(s) Oral daily  artificial  tears Solution 2 Drop(s) Both EYES four times a day  ascorbic acid 500 milliGRAM(s) Oral daily  calcium carbonate    500 mG (Tums) Chewable 1 Tablet(s) Chew every 12 hours  cephalexin 500 milliGRAM(s) Oral every 6 hours  chlorhexidine 0.12% Liquid 15 milliLiter(s) Oral Mucosa every 12 hours  chlorhexidine 4% Liquid 1 Application(s) Topical <User Schedule>  ciprofloxacin   IVPB 400 milliGRAM(s) IV Intermittent every 12 hours  dextrose 50% Injectable 12.5 Gram(s) IV Push once  dextrose 50% Injectable 50 milliLiter(s) IV Push once  dextrose 50% Injectable 25 Gram(s) IV Push once  dextrose 50% Injectable 25 Gram(s) IV Push once  dextrose Oral Gel 15 Gram(s) Oral once  escitalopram 10 milliGRAM(s) Oral <User Schedule>  gabapentin Solution 100 milliGRAM(s) Enteral Tube at bedtime  glucagon  Injectable 1 milliGRAM(s) IntraMuscular once  hemorrhoidal Ointment 1 Application(s) Rectal daily  hemorrhoidal Ointment      insulin glargine Injectable (LANTUS) 20 Unit(s) SubCutaneous every morning  insulin lispro (ADMELOG) corrective regimen sliding scale   SubCutaneous every 6 hours  insulin regular  human recombinant 19 Unit(s) SubCutaneous <User Schedule>  insulin regular  human recombinant 17 Unit(s) SubCutaneous <User Schedule>  levothyroxine 125 MICROGram(s) Oral <User Schedule>  lidocaine   4% Patch 1 Patch Transdermal daily  melatonin 1 milliGRAM(s) Oral at bedtime  melatonin 5 milliGRAM(s) Oral at bedtime  multivitamin/minerals/iron Oral Solution (CENTRUM) 15 milliLiter(s) Oral daily  nystatin Powder 1 Application(s) Topical every 8 hours  oxybutynin 5 milliGRAM(s) Oral daily  pantoprazole   Suspension 40 milliGRAM(s) Enteral Tube <User Schedule>  petrolatum Ophthalmic Ointment 1 Application(s) Both EYES four times a day  predniSONE   Tablet 5 milliGRAM(s) Oral daily  QUEtiapine 12.5 milliGRAM(s) Oral <User Schedule>  simethicone 80 milliGRAM(s) Chew four times a day  sodium chloride 3%  Inhalation 4 milliLiter(s) Inhalation every 12 hours  zinc oxide 40% Paste 1 Application(s) Topical daily    MEDICATIONS  (PRN):  acetaminophen   Oral Liquid .. 1000 milliGRAM(s) Enteral Tube every 6 hours PRN Temp greater or equal to 38C (100.4F), Mild Pain (1 - 3)  benzocaine 20% Spray 1 Spray(s) Topical four times a day PRN Cough  diclofenac sodium 1% Gel 2 Gram(s) Topical four times a day PRN pain  diphenhydrAMINE Elixir 25 milliGRAM(s) Oral every 6 hours PRN Rash and/or Itching  guaifenesin/dextromethorphan Oral Liquid 10 milliLiter(s) Oral every 6 hours PRN Cough  oxyCODONE    Solution 2.5 milliGRAM(s) Oral every 6 hours PRN Moderate Pain (4 - 6)  sodium chloride 0.65% Nasal 1 Spray(s) Both Nostrils every 6 hours PRN Nasal Congestion      LABS:                        10.7   9.12  )-----------( 132      ( 30 Nov 2023 07:13 )             34.7     11-30    136  |  98  |  14  ----------------------------<  130<H>  4.4   |  27  |  <0.30<L>    Ca    9.2      30 Nov 2023 07:15  Phos  4.0     11-30  Mg     1.9     11-30    TPro  7.1  /  Alb  3.2<L>  /  TBili  0.4  /  DBili  x   /  AST  41<H>  /  ALT  23  /  AlkPhos  52  11-30      Urinalysis Basic - ( 30 Nov 2023 07:15 )    Color: x / Appearance: x / SG: x / pH: x  Gluc: 130 mg/dL / Ketone: x  / Bili: x / Urobili: x   Blood: x / Protein: x / Nitrite: x   Leuk Esterase: x / RBC: x / WBC x   Sq Epi: x / Non Sq Epi: x / Bacteria: x          CAPILLARY BLOOD GLUCOSE    MICROBIOLOGY:     RADIOLOGY:  [ ] Reviewed and interpreted by me    Mode: AC/ CMV (Assist Control/ Continuous Mandatory Ventilation)  RR (machine): 12  TV (machine): 300  FiO2: 30  PEEP: 5  ITime: 1  MAP: 10  PIP: 33   Patient is a 73y old  Female who presents with a chief complaint of Bleeding from tracheostomy and PEG tube (21 Nov 2023 08:15)    HPI:  72 y/o F with PMHx of T2DM, HTN, HLD, anemia, hypothyroidism, RA, fibromyalgia, remote cerebral aneurysm repair, acute hypercapnic respiratory failure now with tracheostomy, PEG tube, and bedbound (trach change 8/18, PEG change 8/14), sacral pressure ulcer, BIBEMS from home for 6 day hx of bleeding from the tracheostomy and PEG tube with associated abdominal pain. Patient still having regular bowel movements, last one occurred prior to ED arrival. Was seen outpatient on 10/26 by critical care Dr. Zaki Gottlieb and apparently had cultures sent at that time. Patient also noted to have chronic sacral decubitous ulcer. Family endorsed one episode of fever, though was not febrile on evaluation. Daughter noted patient was recently experiencing auditory and visual hallucinations (seeing animals that were not there & hearing a "bomb" going off outside her home), though patient not actively hallucinating on evaluation.  ED course notable for CT neck imaging showing no evidence of active bleeding around the tracheostomy site, no hematomas, and no drainable collection around the tracheostomy site. CT abdomen/pelvis notable for gastrostomy tube localized to the stomach with mild thickening noted along the tract; a sacral decubitus ulcer extending to the coccyx with osseous remodeling, possibly representing osteomyelitis; and bibasilar patchy opacities with volume loss in the lungs, representing atelectasis vs infection. Patient admitted for respiratory support, wound care of tracheostomy and PEG, and management of presumed sacral osteomyelitis.   (28 Oct 2023 04:18)    Interval Events: no overnight events     REVIEW OF SYSTEMS:  [ X ] Positive - endorses diffuse abdominal pain for about 3 days, states she did not sleep well last night as well.   [ ] All other systems negative  [ ] Unable to assess ROS because ________    Vital Signs Last 24 Hrs  T(C): 37.2 (12-01-23 @ 04:27), Max: 37.2 (12-01-23 @ 04:27)  T(F): 99 (12-01-23 @ 04:27), Max: 99 (12-01-23 @ 04:27)  HR: 91 (12-01-23 @ 05:25) (80 - 99)  BP: 135/71 (12-01-23 @ 04:27) (126/58 - 136/72)  RR: 20 (12-01-23 @ 04:27) (20 - 20)  SpO2: 99% (12-01-23 @ 05:25) (98% - 100%)    PHYSICAL EXAM:  HEENT:   [X]Tracheostomy: #8 distal XLT Shiley  [X]Pupils equal  [ ]No oral lesions  [ ]Abnormal    SKIN  [ ]No Rash  [ ] Abnormal  [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    CARDIAC  [X]Regular  [ ]Abnormal    PULMONARY  [ ]Bilateral Clear Breath Sounds  [ ]Normal Excursion  [X]Abnormal - BL Coarse BS    GI  [X]PEG      [X] +BS		              [ ]Soft, nondistended, nontender	  [ X ]Abnormal - abdomen soft, nondistended. tender to deep palpation in RUQ    MUSCULOSKELETAL                                   [X]Bedbound                 [ ]Abnormal    [ ]Ambulatory/OOB to chair                           EXTREMITIES                                         [X]Normal  [ ]Edema                           NEUROLOGIC  [ ] Normal, non focal  [X] Focal findings: opens eyes spontaneously, follows commands on all 4 extremities, mouthing words and able to intermittently converse by speaking over ventilator    PSYCHIATRIC  [X]Alert and appropriate  [ ] Sedated	 [ ]Agitated    :  Mcbride: [ ] Yes, if yes: Date of Placement:                   [X] No    LINES: Central Lines [ ] Yes, if yes: Date of Placement                                     [X] No    HOSPITAL MEDICATIONS:  MEDICATIONS  (STANDING):  albuterol/ipratropium for Nebulization 3 milliLiter(s) Nebulizer every 6 hours  amLODIPine   Tablet 10 milliGRAM(s) Oral daily  artificial  tears Solution 2 Drop(s) Both EYES four times a day  ascorbic acid 500 milliGRAM(s) Oral daily  calcium carbonate    500 mG (Tums) Chewable 1 Tablet(s) Chew every 12 hours  cephalexin 500 milliGRAM(s) Oral every 6 hours  chlorhexidine 0.12% Liquid 15 milliLiter(s) Oral Mucosa every 12 hours  chlorhexidine 4% Liquid 1 Application(s) Topical <User Schedule>  ciprofloxacin   IVPB 400 milliGRAM(s) IV Intermittent every 12 hours  dextrose 50% Injectable 12.5 Gram(s) IV Push once  dextrose 50% Injectable 50 milliLiter(s) IV Push once  dextrose 50% Injectable 25 Gram(s) IV Push once  dextrose 50% Injectable 25 Gram(s) IV Push once  dextrose Oral Gel 15 Gram(s) Oral once  escitalopram 10 milliGRAM(s) Oral <User Schedule>  gabapentin Solution 100 milliGRAM(s) Enteral Tube at bedtime  glucagon  Injectable 1 milliGRAM(s) IntraMuscular once  hemorrhoidal Ointment 1 Application(s) Rectal daily  hemorrhoidal Ointment      insulin glargine Injectable (LANTUS) 20 Unit(s) SubCutaneous every morning  insulin lispro (ADMELOG) corrective regimen sliding scale   SubCutaneous every 6 hours  insulin regular  human recombinant 19 Unit(s) SubCutaneous <User Schedule>  insulin regular  human recombinant 17 Unit(s) SubCutaneous <User Schedule>  levothyroxine 125 MICROGram(s) Oral <User Schedule>  lidocaine   4% Patch 1 Patch Transdermal daily  melatonin 1 milliGRAM(s) Oral at bedtime  melatonin 5 milliGRAM(s) Oral at bedtime  multivitamin/minerals/iron Oral Solution (CENTRUM) 15 milliLiter(s) Oral daily  nystatin Powder 1 Application(s) Topical every 8 hours  oxybutynin 5 milliGRAM(s) Oral daily  pantoprazole   Suspension 40 milliGRAM(s) Enteral Tube <User Schedule>  petrolatum Ophthalmic Ointment 1 Application(s) Both EYES four times a day  predniSONE   Tablet 5 milliGRAM(s) Oral daily  QUEtiapine 12.5 milliGRAM(s) Oral <User Schedule>  simethicone 80 milliGRAM(s) Chew four times a day  sodium chloride 3%  Inhalation 4 milliLiter(s) Inhalation every 12 hours  zinc oxide 40% Paste 1 Application(s) Topical daily    MEDICATIONS  (PRN):  acetaminophen   Oral Liquid .. 1000 milliGRAM(s) Enteral Tube every 6 hours PRN Temp greater or equal to 38C (100.4F), Mild Pain (1 - 3)  benzocaine 20% Spray 1 Spray(s) Topical four times a day PRN Cough  diclofenac sodium 1% Gel 2 Gram(s) Topical four times a day PRN pain  diphenhydrAMINE Elixir 25 milliGRAM(s) Oral every 6 hours PRN Rash and/or Itching  guaifenesin/dextromethorphan Oral Liquid 10 milliLiter(s) Oral every 6 hours PRN Cough  oxyCODONE    Solution 2.5 milliGRAM(s) Oral every 6 hours PRN Moderate Pain (4 - 6)  sodium chloride 0.65% Nasal 1 Spray(s) Both Nostrils every 6 hours PRN Nasal Congestion      LABS:                        10.7   9.12  )-----------( 132      ( 30 Nov 2023 07:13 )             34.7     11-30    136  |  98  |  14  ----------------------------<  130<H>  4.4   |  27  |  <0.30<L>    Ca    9.2      30 Nov 2023 07:15  Phos  4.0     11-30  Mg     1.9     11-30    TPro  7.1  /  Alb  3.2<L>  /  TBili  0.4  /  DBili  x   /  AST  41<H>  /  ALT  23  /  AlkPhos  52  11-30      Urinalysis Basic - ( 30 Nov 2023 07:15 )    Color: x / Appearance: x / SG: x / pH: x  Gluc: 130 mg/dL / Ketone: x  / Bili: x / Urobili: x   Blood: x / Protein: x / Nitrite: x   Leuk Esterase: x / RBC: x / WBC x   Sq Epi: x / Non Sq Epi: x / Bacteria: x          CAPILLARY BLOOD GLUCOSE    MICROBIOLOGY:     RADIOLOGY:  [ ] Reviewed and interpreted by me    Mode: AC/ CMV (Assist Control/ Continuous Mandatory Ventilation)  RR (machine): 12  TV (machine): 300  FiO2: 30  PEEP: 5  ITime: 1  MAP: 10  PIP: 33

## 2023-12-01 NOTE — PROGRESS NOTE ADULT - PROBLEM SELECTOR PLAN 4
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   - Bowel regimen  - PEG Site appears macerated. Multifactorial, possible the PEG has been too tight at home.  - Peg loosened by GI at beside, no leakage or further intervention required   - recurrent RLQ abdominal pain and bleeding at the PEG site  - reevaluated by GI -- no bleeding at the PEG site  - no BM since 11/19 -- increased dose of Senna and increased Miralax to BID   - 11/28 frequent multiple BMs, senna and Miralax d/c'd - GI evaluated  - Continue Simethicone. 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   - Bowel regimen  - PEG Site appears macerated. Multifactorial, possible the PEG has been too tight at home.  - Peg loosened by GI at beside, no leakage or further intervention required   - recurrent RLQ abdominal pain and bleeding at the PEG site  - reevaluated by GI -- no bleeding at the PEG site  - no BM since 11/19 -- increased dose of Senna and increased Miralax to BID   - 11/28 frequent multiple BMs, senna and Miralax d/c'd - GI evaluated  - Continue Simethicone.  - 12/1: pending abdominal ultrasound

## 2023-12-01 NOTE — PROGRESS NOTE ADULT - PROBLEM SELECTOR PLAN 9
DVT PPx: Lovenox discontinued given daughter's concern for trach and PEG stoma bleeding/oozing.  Also with mild thrombocytopenia.  Will continue SCDs.     GOC: Full code  __ long discussion of MICU team with daughter, pt remains FULL CODE, daughter wishes MOLST form reevaluation once pt is less delirious DVT PPx: Lovenox discontinued given daughter's concern for trach and PEG stoma bleeding/oozing.  Also with mild thrombocytopenia.  Will continue SCDs.     GOC: Full code  __ long discussion of RCU team with daughter, pt remains FULL CODE, daughter wishes MOLST form reevaluation once pt is less delirious

## 2023-12-01 NOTE — PROGRESS NOTE ADULT - SUBJECTIVE AND OBJECTIVE BOX
74 y/o F with PMHx of T2DM, HTN, HLD, anemia, hypothyroidism, RA, fibromyalgia, remote cerebral aneurysm repair, acute hypercapnic respiratory failure now with tracheostomy, PEG tube, and bedbound (trach change 8/18, PEG change 8/14), sacral pressure ulcer, BIBEMS from home 10/28/23for 6 day hx of bleeding from the tracheostomy and PEG tube with associated abdominal pain.   Seen for thrombocytopenia. She has had a mild thrombocytopenia essentially the whole admission and it was present on admission. There are no records from prior to admission.  She also has anemia.     PAST MEDICAL & SURGICAL HISTORY:  Diabetes      Rheumatoid arthritis      Fibromyalgia      Hypothyroid      Hypertension      H/O tracheostomy      PEG (percutaneous endoscopic gastrostomy) status        Allergies    penicillin (Unknown)  heparin (Unknown)  Tagamet (Unknown)  pineapple (Unknown)  walnut (Unknown)  Pecan, Filbert, Hazelnut (Unknown)  Lyrica (Unknown)    Intolerances      Social History:  lives at home with family  dependent for all ADL  no toxic habits  FULL CODE (28 Oct 2023 04:18)    Medications:  acetaminophen   Oral Liquid .. 1000 milliGRAM(s) Enteral Tube every 6 hours PRN Temp greater or equal to 38C (100.4F), Mild Pain (1 - 3)  albuterol/ipratropium for Nebulization 3 milliLiter(s) Nebulizer every 6 hours  amLODIPine   Tablet 10 milliGRAM(s) Oral daily  artificial  tears Solution 2 Drop(s) Both EYES four times a day  ascorbic acid 500 milliGRAM(s) Oral daily  benzocaine 20% Spray 1 Spray(s) Topical four times a day PRN Cough  calcium carbonate    500 mG (Tums) Chewable 1 Tablet(s) Chew every 12 hours  cephalexin 500 milliGRAM(s) Oral every 6 hours  chlorhexidine 0.12% Liquid 15 milliLiter(s) Oral Mucosa every 12 hours  chlorhexidine 4% Liquid 1 Application(s) Topical <User Schedule>  ciprofloxacin   IVPB 400 milliGRAM(s) IV Intermittent every 12 hours  dextrose 50% Injectable 12.5 Gram(s) IV Push once  dextrose 50% Injectable 25 Gram(s) IV Push once  dextrose 50% Injectable 50 milliLiter(s) IV Push once  dextrose 50% Injectable 25 Gram(s) IV Push once  dextrose Oral Gel 15 Gram(s) Oral once  diclofenac sodium 1% Gel 2 Gram(s) Topical four times a day PRN pain  diphenhydrAMINE Elixir 25 milliGRAM(s) Oral every 6 hours PRN Rash and/or Itching  escitalopram 10 milliGRAM(s) Oral <User Schedule>  gabapentin Solution 100 milliGRAM(s) Enteral Tube at bedtime  glucagon  Injectable 1 milliGRAM(s) IntraMuscular once  guaifenesin/dextromethorphan Oral Liquid 10 milliLiter(s) Oral every 6 hours PRN Cough  hemorrhoidal Ointment      hemorrhoidal Ointment 1 Application(s) Rectal daily  insulin glargine Injectable (LANTUS) 20 Unit(s) SubCutaneous every morning  insulin lispro (ADMELOG) corrective regimen sliding scale   SubCutaneous every 6 hours  insulin regular  human recombinant 19 Unit(s) SubCutaneous <User Schedule>  insulin regular  human recombinant 15 Unit(s) SubCutaneous <User Schedule>  levothyroxine 125 MICROGram(s) Oral <User Schedule>  lidocaine   4% Patch 1 Patch Transdermal daily  melatonin 1 milliGRAM(s) Oral at bedtime  melatonin 5 milliGRAM(s) Oral at bedtime  multivitamin/minerals/iron Oral Solution (CENTRUM) 15 milliLiter(s) Oral daily  nystatin Powder 1 Application(s) Topical every 8 hours  oxybutynin 5 milliGRAM(s) Oral daily  oxyCODONE    Solution 2.5 milliGRAM(s) Oral every 6 hours PRN Moderate Pain (4 - 6)  pantoprazole   Suspension 40 milliGRAM(s) Enteral Tube <User Schedule>  petrolatum Ophthalmic Ointment 1 Application(s) Both EYES four times a day  predniSONE   Tablet 5 milliGRAM(s) Oral daily  QUEtiapine 12.5 milliGRAM(s) Oral <User Schedule>  simethicone 80 milliGRAM(s) Chew four times a day  sodium chloride 0.65% Nasal 1 Spray(s) Both Nostrils every 6 hours PRN Nasal Congestion  sodium chloride 3%  Inhalation 4 milliLiter(s) Inhalation every 12 hours  zinc oxide 40% Paste 1 Application(s) Topical daily    Labs:  CBC Full  -  ( 01 Dec 2023 12:18 )  WBC Count : 12.56 K/uL  RBC Count : 3.60 M/uL  Hemoglobin : 10.0 g/dL  Hematocrit : 33.0 %  Platelet Count - Automated : 119 K/uL  Mean Cell Volume : 91.7 fl  Mean Cell Hemoglobin : 27.8 pg  Mean Cell Hemoglobin Concentration : 30.3 gm/dL  Auto Neutrophil # : x  Auto Lymphocyte # : x  Auto Monocyte # : x  Auto Eosinophil # : x  Auto Basophil # : x  Auto Neutrophil % : x  Auto Lymphocyte % : x  Auto Monocyte % : x  Auto Eosinophil % : x  Auto Basophil % : x    12-01    130<L>  |  94<L>  |  16  ----------------------------<  288<H>  4.4   |  24  |  <0.30<L>    Ca    9.1      01 Dec 2023 12:18  Phos  4.0     12-01  Mg     1.8     12-01    TPro  7.1  /  Alb  3.2<L>  /  TBili  0.5  /  DBili  x   /  AST  27  /  ALT  28  /  AlkPhos  58  12-01      Radiology:             ROS:  Patient comfortable without distress    T(C): 37.3 (01 Dec 2023 13:36), Max: 37.3 (01 Dec 2023 13:36)  T(F): 99.2 (01 Dec 2023 13:36), Max: 99.2 (01 Dec 2023 13:36)  HR: 92 (01 Dec 2023 15:47) (80 - 95)  BP: 126/62 (01 Dec 2023 13:36) (126/58 - 135/71)  BP(mean): --  RR: 18 (01 Dec 2023 13:36) (18 - 20)  SpO2: 99% (01 Dec 2023 15:47) (99% - 100%)    Parameters below as of 01 Dec 2023 13:36  Patient On (Oxygen Delivery Method): ventilator        Physical exam:  Patient comfortable  No distress, has track  CVS: S1, S2   Chest: bilateral breath sound without rales  Abdomen: Has PEG  CNS: No focal neuro deficit  Musculoskeletal:  Normal range of motion  Skin: Decubiti as in chart    Assessment and Plan:

## 2023-12-02 LAB
ANION GAP SERPL CALC-SCNC: 8 MMOL/L — SIGNIFICANT CHANGE UP (ref 5–17)
ANION GAP SERPL CALC-SCNC: 8 MMOL/L — SIGNIFICANT CHANGE UP (ref 5–17)
BASOPHILS # BLD AUTO: 0 K/UL — SIGNIFICANT CHANGE UP (ref 0–0.2)
BASOPHILS # BLD AUTO: 0 K/UL — SIGNIFICANT CHANGE UP (ref 0–0.2)
BASOPHILS NFR BLD AUTO: 0 % — SIGNIFICANT CHANGE UP (ref 0–2)
BASOPHILS NFR BLD AUTO: 0 % — SIGNIFICANT CHANGE UP (ref 0–2)
BUN SERPL-MCNC: 16 MG/DL — SIGNIFICANT CHANGE UP (ref 7–23)
BUN SERPL-MCNC: 16 MG/DL — SIGNIFICANT CHANGE UP (ref 7–23)
CALCIUM SERPL-MCNC: 9.3 MG/DL — SIGNIFICANT CHANGE UP (ref 8.4–10.5)
CALCIUM SERPL-MCNC: 9.3 MG/DL — SIGNIFICANT CHANGE UP (ref 8.4–10.5)
CHLORIDE SERPL-SCNC: 97 MMOL/L — SIGNIFICANT CHANGE UP (ref 96–108)
CHLORIDE SERPL-SCNC: 97 MMOL/L — SIGNIFICANT CHANGE UP (ref 96–108)
CO2 SERPL-SCNC: 28 MMOL/L — SIGNIFICANT CHANGE UP (ref 22–31)
CO2 SERPL-SCNC: 28 MMOL/L — SIGNIFICANT CHANGE UP (ref 22–31)
CREAT SERPL-MCNC: <0.3 MG/DL — LOW (ref 0.5–1.3)
CREAT SERPL-MCNC: <0.3 MG/DL — LOW (ref 0.5–1.3)
EGFR: 113 ML/MIN/1.73M2 — SIGNIFICANT CHANGE UP
EGFR: 113 ML/MIN/1.73M2 — SIGNIFICANT CHANGE UP
EOSINOPHIL # BLD AUTO: 0 K/UL — SIGNIFICANT CHANGE UP (ref 0–0.5)
EOSINOPHIL # BLD AUTO: 0 K/UL — SIGNIFICANT CHANGE UP (ref 0–0.5)
EOSINOPHIL NFR BLD AUTO: 0 % — SIGNIFICANT CHANGE UP (ref 0–6)
EOSINOPHIL NFR BLD AUTO: 0 % — SIGNIFICANT CHANGE UP (ref 0–6)
GLUCOSE BLDC GLUCOMTR-MCNC: 102 MG/DL — HIGH (ref 70–99)
GLUCOSE BLDC GLUCOMTR-MCNC: 102 MG/DL — HIGH (ref 70–99)
GLUCOSE BLDC GLUCOMTR-MCNC: 143 MG/DL — HIGH (ref 70–99)
GLUCOSE BLDC GLUCOMTR-MCNC: 143 MG/DL — HIGH (ref 70–99)
GLUCOSE BLDC GLUCOMTR-MCNC: 201 MG/DL — HIGH (ref 70–99)
GLUCOSE BLDC GLUCOMTR-MCNC: 201 MG/DL — HIGH (ref 70–99)
GLUCOSE BLDC GLUCOMTR-MCNC: 94 MG/DL — SIGNIFICANT CHANGE UP (ref 70–99)
GLUCOSE BLDC GLUCOMTR-MCNC: 94 MG/DL — SIGNIFICANT CHANGE UP (ref 70–99)
GLUCOSE SERPL-MCNC: 177 MG/DL — HIGH (ref 70–99)
GLUCOSE SERPL-MCNC: 177 MG/DL — HIGH (ref 70–99)
HCT VFR BLD CALC: 33.3 % — LOW (ref 34.5–45)
HCT VFR BLD CALC: 33.3 % — LOW (ref 34.5–45)
HGB BLD-MCNC: 10.3 G/DL — LOW (ref 11.5–15.5)
HGB BLD-MCNC: 10.3 G/DL — LOW (ref 11.5–15.5)
LYMPHOCYTES # BLD AUTO: 1.02 K/UL — SIGNIFICANT CHANGE UP (ref 1–3.3)
LYMPHOCYTES # BLD AUTO: 1.02 K/UL — SIGNIFICANT CHANGE UP (ref 1–3.3)
LYMPHOCYTES # BLD AUTO: 9.7 % — LOW (ref 13–44)
LYMPHOCYTES # BLD AUTO: 9.7 % — LOW (ref 13–44)
MAGNESIUM SERPL-MCNC: 1.8 MG/DL — SIGNIFICANT CHANGE UP (ref 1.6–2.6)
MAGNESIUM SERPL-MCNC: 1.8 MG/DL — SIGNIFICANT CHANGE UP (ref 1.6–2.6)
MANUAL SMEAR VERIFICATION: SIGNIFICANT CHANGE UP
MANUAL SMEAR VERIFICATION: SIGNIFICANT CHANGE UP
MCHC RBC-ENTMCNC: 28.2 PG — SIGNIFICANT CHANGE UP (ref 27–34)
MCHC RBC-ENTMCNC: 28.2 PG — SIGNIFICANT CHANGE UP (ref 27–34)
MCHC RBC-ENTMCNC: 30.9 GM/DL — LOW (ref 32–36)
MCHC RBC-ENTMCNC: 30.9 GM/DL — LOW (ref 32–36)
MCV RBC AUTO: 91.2 FL — SIGNIFICANT CHANGE UP (ref 80–100)
MCV RBC AUTO: 91.2 FL — SIGNIFICANT CHANGE UP (ref 80–100)
MONOCYTES # BLD AUTO: 0.84 K/UL — SIGNIFICANT CHANGE UP (ref 0–0.9)
MONOCYTES # BLD AUTO: 0.84 K/UL — SIGNIFICANT CHANGE UP (ref 0–0.9)
MONOCYTES NFR BLD AUTO: 8 % — SIGNIFICANT CHANGE UP (ref 2–14)
MONOCYTES NFR BLD AUTO: 8 % — SIGNIFICANT CHANGE UP (ref 2–14)
NEUTROPHILS # BLD AUTO: 8.64 K/UL — HIGH (ref 1.8–7.4)
NEUTROPHILS # BLD AUTO: 8.64 K/UL — HIGH (ref 1.8–7.4)
NEUTROPHILS NFR BLD AUTO: 81.4 % — HIGH (ref 43–77)
NEUTROPHILS NFR BLD AUTO: 81.4 % — HIGH (ref 43–77)
NEUTS BAND # BLD: 0.9 % — SIGNIFICANT CHANGE UP (ref 0–8)
NEUTS BAND # BLD: 0.9 % — SIGNIFICANT CHANGE UP (ref 0–8)
PHOSPHATE SERPL-MCNC: 4.8 MG/DL — HIGH (ref 2.5–4.5)
PHOSPHATE SERPL-MCNC: 4.8 MG/DL — HIGH (ref 2.5–4.5)
PLAT MORPH BLD: NORMAL — SIGNIFICANT CHANGE UP
PLAT MORPH BLD: NORMAL — SIGNIFICANT CHANGE UP
PLATELET # BLD AUTO: 107 K/UL — LOW (ref 150–400)
PLATELET # BLD AUTO: 107 K/UL — LOW (ref 150–400)
POTASSIUM SERPL-MCNC: 4.2 MMOL/L — SIGNIFICANT CHANGE UP (ref 3.5–5.3)
POTASSIUM SERPL-MCNC: 4.2 MMOL/L — SIGNIFICANT CHANGE UP (ref 3.5–5.3)
POTASSIUM SERPL-SCNC: 4.2 MMOL/L — SIGNIFICANT CHANGE UP (ref 3.5–5.3)
POTASSIUM SERPL-SCNC: 4.2 MMOL/L — SIGNIFICANT CHANGE UP (ref 3.5–5.3)
RBC # BLD: 3.65 M/UL — LOW (ref 3.8–5.2)
RBC # BLD: 3.65 M/UL — LOW (ref 3.8–5.2)
RBC # FLD: 16 % — HIGH (ref 10.3–14.5)
RBC # FLD: 16 % — HIGH (ref 10.3–14.5)
RBC BLD AUTO: SIGNIFICANT CHANGE UP
RBC BLD AUTO: SIGNIFICANT CHANGE UP
SODIUM SERPL-SCNC: 133 MMOL/L — LOW (ref 135–145)
SODIUM SERPL-SCNC: 133 MMOL/L — LOW (ref 135–145)
WBC # BLD: 10.5 K/UL — SIGNIFICANT CHANGE UP (ref 3.8–10.5)
WBC # BLD: 10.5 K/UL — SIGNIFICANT CHANGE UP (ref 3.8–10.5)
WBC # FLD AUTO: 10.5 K/UL — SIGNIFICANT CHANGE UP (ref 3.8–10.5)
WBC # FLD AUTO: 10.5 K/UL — SIGNIFICANT CHANGE UP (ref 3.8–10.5)

## 2023-12-02 PROCEDURE — 99233 SBSQ HOSP IP/OBS HIGH 50: CPT | Mod: FS

## 2023-12-02 RX ADMIN — Medication 1 MILLIGRAM(S): at 21:58

## 2023-12-02 RX ADMIN — NYSTATIN CREAM 1 APPLICATION(S): 100000 CREAM TOPICAL at 21:59

## 2023-12-02 RX ADMIN — Medication 2 DROP(S): at 11:50

## 2023-12-02 RX ADMIN — SIMETHICONE 80 MILLIGRAM(S): 80 TABLET, CHEWABLE ORAL at 11:49

## 2023-12-02 RX ADMIN — Medication 500 MILLIGRAM(S): at 00:11

## 2023-12-02 RX ADMIN — Medication 3 MILLILITER(S): at 23:30

## 2023-12-02 RX ADMIN — Medication 1 APPLICATION(S): at 00:12

## 2023-12-02 RX ADMIN — Medication 5 MILLIGRAM(S): at 21:58

## 2023-12-02 RX ADMIN — Medication 500 MILLIGRAM(S): at 11:49

## 2023-12-02 RX ADMIN — Medication 5 MILLIGRAM(S): at 11:49

## 2023-12-02 RX ADMIN — CHLORHEXIDINE GLUCONATE 15 MILLILITER(S): 213 SOLUTION TOPICAL at 18:24

## 2023-12-02 RX ADMIN — LIDOCAINE 1 PATCH: 4 CREAM TOPICAL at 00:01

## 2023-12-02 RX ADMIN — Medication 15 MILLILITER(S): at 11:49

## 2023-12-02 RX ADMIN — CHLORHEXIDINE GLUCONATE 1 APPLICATION(S): 213 SOLUTION TOPICAL at 05:50

## 2023-12-02 RX ADMIN — Medication 500 MILLIGRAM(S): at 18:24

## 2023-12-02 RX ADMIN — INSULIN HUMAN 15 UNIT(S): 100 INJECTION, SOLUTION SUBCUTANEOUS at 18:25

## 2023-12-02 RX ADMIN — PANTOPRAZOLE SODIUM 40 MILLIGRAM(S): 20 TABLET, DELAYED RELEASE ORAL at 06:15

## 2023-12-02 RX ADMIN — ESCITALOPRAM OXALATE 10 MILLIGRAM(S): 10 TABLET, FILM COATED ORAL at 09:06

## 2023-12-02 RX ADMIN — QUETIAPINE FUMARATE 12.5 MILLIGRAM(S): 200 TABLET, FILM COATED ORAL at 18:24

## 2023-12-02 RX ADMIN — Medication 2 DROP(S): at 00:12

## 2023-12-02 RX ADMIN — Medication 1 TABLET(S): at 06:15

## 2023-12-02 RX ADMIN — Medication 500 MILLIGRAM(S): at 05:42

## 2023-12-02 RX ADMIN — Medication 2 DROP(S): at 05:50

## 2023-12-02 RX ADMIN — CHLORHEXIDINE GLUCONATE 15 MILLILITER(S): 213 SOLUTION TOPICAL at 05:50

## 2023-12-02 RX ADMIN — Medication 200 MILLIGRAM(S): at 18:24

## 2023-12-02 RX ADMIN — ZINC OXIDE 1 APPLICATION(S): 200 OINTMENT TOPICAL at 11:51

## 2023-12-02 RX ADMIN — LIDOCAINE 1 PATCH: 4 CREAM TOPICAL at 11:50

## 2023-12-02 RX ADMIN — AMLODIPINE BESYLATE 10 MILLIGRAM(S): 2.5 TABLET ORAL at 05:42

## 2023-12-02 RX ADMIN — Medication 1 TABLET(S): at 18:25

## 2023-12-02 RX ADMIN — Medication 5 MILLIGRAM(S): at 05:41

## 2023-12-02 RX ADMIN — Medication 200 MILLIGRAM(S): at 05:36

## 2023-12-02 RX ADMIN — NYSTATIN CREAM 1 APPLICATION(S): 100000 CREAM TOPICAL at 14:49

## 2023-12-02 RX ADMIN — SIMETHICONE 80 MILLIGRAM(S): 80 TABLET, CHEWABLE ORAL at 18:24

## 2023-12-02 RX ADMIN — SIMETHICONE 80 MILLIGRAM(S): 80 TABLET, CHEWABLE ORAL at 07:04

## 2023-12-02 RX ADMIN — Medication 1 APPLICATION(S): at 18:24

## 2023-12-02 RX ADMIN — INSULIN HUMAN 19 UNIT(S): 100 INJECTION, SOLUTION SUBCUTANEOUS at 05:51

## 2023-12-02 RX ADMIN — Medication 4: at 12:20

## 2023-12-02 RX ADMIN — PHENYLEPHRINE-SHARK LIVER OIL-MINERAL OIL-PETROLATUM RECTAL OINTMENT 1 APPLICATION(S): at 11:51

## 2023-12-02 RX ADMIN — Medication 1 APPLICATION(S): at 12:00

## 2023-12-02 RX ADMIN — Medication 2 DROP(S): at 18:25

## 2023-12-02 RX ADMIN — SIMETHICONE 80 MILLIGRAM(S): 80 TABLET, CHEWABLE ORAL at 00:11

## 2023-12-02 RX ADMIN — Medication 125 MICROGRAM(S): at 05:49

## 2023-12-02 RX ADMIN — NYSTATIN CREAM 1 APPLICATION(S): 100000 CREAM TOPICAL at 05:50

## 2023-12-02 RX ADMIN — LIDOCAINE 1 PATCH: 4 CREAM TOPICAL at 20:24

## 2023-12-02 RX ADMIN — Medication 3 MILLILITER(S): at 12:25

## 2023-12-02 RX ADMIN — Medication 3 MILLILITER(S): at 17:17

## 2023-12-02 RX ADMIN — INSULIN HUMAN 15 UNIT(S): 100 INJECTION, SOLUTION SUBCUTANEOUS at 12:19

## 2023-12-02 RX ADMIN — SODIUM CHLORIDE 4 MILLILITER(S): 9 INJECTION INTRAMUSCULAR; INTRAVENOUS; SUBCUTANEOUS at 17:17

## 2023-12-02 RX ADMIN — GABAPENTIN 100 MILLIGRAM(S): 400 CAPSULE ORAL at 21:58

## 2023-12-02 RX ADMIN — INSULIN GLARGINE 20 UNIT(S): 100 INJECTION, SOLUTION SUBCUTANEOUS at 08:34

## 2023-12-02 RX ADMIN — Medication 1 APPLICATION(S): at 05:50

## 2023-12-02 NOTE — PROGRESS NOTE ADULT - PROBLEM SELECTOR PLAN 7
- s/p Home Levemir 14 units in morning held on admission as noted w/ hypoglycemia   - Enteral feed changed to Side.Cr diabetic formula; glucose numbers stabilizing. - s/p Home Levemir 14 units in morning held on admission as noted w/ hypoglycemia   - Enteral feed changed to GlucoVista diabetic formula; glucose numbers stabilizing. - s/p Home Levemir 14 units in morning held on admission as noted w/ hypoglycemia   - Enteral feed changed to Flasma diabetic formula; glucose numbers stabilizing.

## 2023-12-02 NOTE — PROGRESS NOTE ADULT - SUBJECTIVE AND OBJECTIVE BOX
Patient is a 73y old  Female who presents with a chief complaint of Respiratory failure (01 Dec 2023 16:16)      Interval Events:    REVIEW OF SYSTEMS:  [ ] Positive  [ ] All other systems negative  [ ] Unable to assess ROS because ________    Vital Signs Last 24 Hrs  T(C): 37.2 (12-02-23 @ 04:24), Max: 37.3 (12-01-23 @ 13:36)  T(F): 99 (12-02-23 @ 04:24), Max: 99.2 (12-01-23 @ 13:36)  HR: 82 (12-02-23 @ 04:24) (80 - 92)  BP: 155/65 (12-02-23 @ 04:24) (108/50 - 155/65)  RR: 14 (12-02-23 @ 04:24) (14 - 20)  SpO2: 100% (12-02-23 @ 04:24) (99% - 100%)    PHYSICAL EXAM:  HEENT:   [ ]Tracheostomy:  [ ]Pupils equal  [ ]No oral lesions  [ ]Abnormal    SKIN  [ ]No Rash  [ ] Abnormal  [ ] pressure    CARDIAC  [ ]Regular  [ ]Abnormal    PULMONARY  [ ]Bilateral Clear Breath Sounds  [ ]Normal Excursion  [ ]Abnormal    GI  [ ]PEG      [ ] +BS		              [ ]Soft, nondistended, nontender	  [ ]Abnormal    MUSCULOSKELETAL                                   [ ]Bedbound                 [ ]Abnormal    [ ]Ambulatory/OOB to chair                           EXTREMITIES                                         [ ]Normal  [ ]Edema                           NEUROLOGIC  [ ] Normal, non focal  [ ] Focal findings:    PSYCHIATRIC  [ ]Alert and appropriate  [ ] Sedated	 [ ]Agitated    :  Mcbride: [ ] Yes, if yes: Date of Placement:                   [  ] No    LINES: Central Lines [ ] Yes, if yes: Date of Placement                                     [  ] No    HOSPITAL MEDICATIONS:  MEDICATIONS  (STANDING):  albuterol/ipratropium for Nebulization 3 milliLiter(s) Nebulizer every 6 hours  amLODIPine   Tablet 10 milliGRAM(s) Oral daily  artificial  tears Solution 2 Drop(s) Both EYES four times a day  ascorbic acid 500 milliGRAM(s) Oral daily  calcium carbonate    500 mG (Tums) Chewable 1 Tablet(s) Chew every 12 hours  cephalexin 500 milliGRAM(s) Oral every 6 hours  chlorhexidine 0.12% Liquid 15 milliLiter(s) Oral Mucosa every 12 hours  chlorhexidine 4% Liquid 1 Application(s) Topical <User Schedule>  ciprofloxacin   IVPB 400 milliGRAM(s) IV Intermittent every 12 hours  dextrose 50% Injectable 12.5 Gram(s) IV Push once  dextrose 50% Injectable 25 Gram(s) IV Push once  dextrose 50% Injectable 25 Gram(s) IV Push once  dextrose 50% Injectable 50 milliLiter(s) IV Push once  dextrose Oral Gel 15 Gram(s) Oral once  escitalopram 10 milliGRAM(s) Oral <User Schedule>  gabapentin Solution 100 milliGRAM(s) Enteral Tube at bedtime  glucagon  Injectable 1 milliGRAM(s) IntraMuscular once  hemorrhoidal Ointment 1 Application(s) Rectal daily  hemorrhoidal Ointment      insulin glargine Injectable (LANTUS) 20 Unit(s) SubCutaneous every morning  insulin lispro (ADMELOG) corrective regimen sliding scale   SubCutaneous every 6 hours  insulin regular  human recombinant 19 Unit(s) SubCutaneous <User Schedule>  insulin regular  human recombinant 15 Unit(s) SubCutaneous <User Schedule>  levothyroxine 125 MICROGram(s) Oral <User Schedule>  lidocaine   4% Patch 1 Patch Transdermal daily  melatonin 1 milliGRAM(s) Oral at bedtime  melatonin 5 milliGRAM(s) Oral at bedtime  multivitamin/minerals/iron Oral Solution (CENTRUM) 15 milliLiter(s) Oral daily  nystatin Powder 1 Application(s) Topical every 8 hours  oxybutynin 5 milliGRAM(s) Oral daily  pantoprazole   Suspension 40 milliGRAM(s) Enteral Tube <User Schedule>  petrolatum Ophthalmic Ointment 1 Application(s) Both EYES four times a day  predniSONE   Tablet 5 milliGRAM(s) Oral daily  QUEtiapine 12.5 milliGRAM(s) Oral <User Schedule>  simethicone 80 milliGRAM(s) Chew four times a day  sodium chloride 3%  Inhalation 4 milliLiter(s) Inhalation every 12 hours  zinc oxide 40% Paste 1 Application(s) Topical daily    MEDICATIONS  (PRN):  acetaminophen   Oral Liquid .. 1000 milliGRAM(s) Enteral Tube every 6 hours PRN Temp greater or equal to 38C (100.4F), Mild Pain (1 - 3)  benzocaine 20% Spray 1 Spray(s) Topical four times a day PRN Cough  diclofenac sodium 1% Gel 2 Gram(s) Topical four times a day PRN pain  diphenhydrAMINE Elixir 25 milliGRAM(s) Oral every 6 hours PRN Rash and/or Itching  guaifenesin/dextromethorphan Oral Liquid 10 milliLiter(s) Oral every 6 hours PRN Cough  oxyCODONE    Solution 2.5 milliGRAM(s) Oral every 6 hours PRN Moderate Pain (4 - 6)  sodium chloride 0.65% Nasal 1 Spray(s) Both Nostrils every 6 hours PRN Nasal Congestion      LABS:                        10.0   12.56 )-----------( 119      ( 01 Dec 2023 12:18 )             33.0     12-01    130<L>  |  94<L>  |  16  ----------------------------<  288<H>  4.4   |  24  |  <0.30<L>    Ca    9.1      01 Dec 2023 12:18  Phos  4.0     12-01  Mg     1.8     12-01    TPro  7.1  /  Alb  3.2<L>  /  TBili  0.5  /  DBili  x   /  AST  27  /  ALT  28  /  AlkPhos  58  12-01      Urinalysis Basic - ( 01 Dec 2023 12:18 )    Color: x / Appearance: x / SG: x / pH: x  Gluc: 288 mg/dL / Ketone: x  / Bili: x / Urobili: x   Blood: x / Protein: x / Nitrite: x   Leuk Esterase: x / RBC: x / WBC x   Sq Epi: x / Non Sq Epi: x / Bacteria: x          CAPILLARY BLOOD GLUCOSE    MICROBIOLOGY:     RADIOLOGY:  [ ] Reviewed and interpreted by me    Mode: AC/ CMV (Assist Control/ Continuous Mandatory Ventilation)  RR (machine): 12  TV (machine): 300  FiO2: 30  PEEP: 5  ITime: 1  MAP: 9  PIP: 29   Patient is a 73y old  Female who presents with a chief complaint of Respiratory failure (01 Dec 2023 16:16)      Interval Events:  No acute events overnight.    REVIEW OF SYSTEMS:  [ ] Positive  [X] All other systems negative  [ ] Unable to assess ROS because ________    Vital Signs Last 24 Hrs  T(C): 37.2 (12-02-23 @ 04:24), Max: 37.3 (12-01-23 @ 13:36)  T(F): 99 (12-02-23 @ 04:24), Max: 99.2 (12-01-23 @ 13:36)  HR: 82 (12-02-23 @ 04:24) (80 - 92)  BP: 155/65 (12-02-23 @ 04:24) (108/50 - 155/65)  RR: 14 (12-02-23 @ 04:24) (14 - 20)  SpO2: 100% (12-02-23 @ 04:24) (99% - 100%)    PHYSICAL EXAM:  HEENT:   [X]Tracheostomy - #8 distal XLT [place]iley  [X]Pupils equal  [ ]No oral lesions  [ ]Abnormal    SKIN  [ ]No Rash  [ ] Abnormal  [X] pressure - stage 4 sacral pressury injury     CARDIAC  [X]Regular  [ ]Abnormal    PULMONARY  [ ]Bilateral Clear Breath Sounds  [ ]Normal Excursion  [X]Abnormal - BL Coarse BS    GI  [X]PEG      [X] +BS		              [X]Soft, nondistended, nontender	  [X]Abnormal - complains of intermittent abdominal pain     MUSCULOSKELETAL                                   [X]Bedbound                 [ ]Abnormal    [ ]Ambulatory/OOB to chair                           EXTREMITIES                                         [X]Normal  [ ]Edema                           NEUROLOGIC  [ ] Normal, non focal  [X] Focal findings - opens eyes spontaneously, follows commands on all 4 extremities, mouthing words and able to intermittently converse by speaking over ventilator    PSYCHIATRIC  [X] Alert and appropriate  [ ] Sedated	 [ ]Agitated    :  Reed: [ ] Yes, if yes: Date of Placement:                   [X] No    LINES: Central Lines [ ] Yes, if yes: Date of Placement                                     [X] No    HOSPITAL MEDICATIONS:  MEDICATIONS  (STANDING):  albuterol/ipratropium for Nebulization 3 milliLiter(s) Nebulizer every 6 hours  amLODIPine   Tablet 10 milliGRAM(s) Oral daily  artificial  tears Solution 2 Drop(s) Both EYES four times a day  ascorbic acid 500 milliGRAM(s) Oral daily  calcium carbonate    500 mG (Tums) Chewable 1 Tablet(s) Chew every 12 hours  cephalexin 500 milliGRAM(s) Oral every 6 hours  chlorhexidine 0.12% Liquid 15 milliLiter(s) Oral Mucosa every 12 hours  chlorhexidine 4% Liquid 1 Application(s) Topical <User Schedule>  ciprofloxacin   IVPB 400 milliGRAM(s) IV Intermittent every 12 hours  dextrose 50% Injectable 12.5 Gram(s) IV Push once  dextrose 50% Injectable 25 Gram(s) IV Push once  dextrose 50% Injectable 25 Gram(s) IV Push once  dextrose 50% Injectable 50 milliLiter(s) IV Push once  dextrose Oral Gel 15 Gram(s) Oral once  escitalopram 10 milliGRAM(s) Oral <User Schedule>  gabapentin Solution 100 milliGRAM(s) Enteral Tube at bedtime  glucagon  Injectable 1 milliGRAM(s) IntraMuscular once  hemorrhoidal Ointment 1 Application(s) Rectal daily  hemorrhoidal Ointment      insulin glargine Injectable (LANTUS) 20 Unit(s) SubCutaneous every morning  insulin lispro (ADMELOG) corrective regimen sliding scale   SubCutaneous every 6 hours  insulin regular  human recombinant 19 Unit(s) SubCutaneous <User Schedule>  insulin regular  human recombinant 15 Unit(s) SubCutaneous <User Schedule>  levothyroxine 125 MICROGram(s) Oral <User Schedule>  lidocaine   4% Patch 1 Patch Transdermal daily  melatonin 1 milliGRAM(s) Oral at bedtime  melatonin 5 milliGRAM(s) Oral at bedtime  multivitamin/minerals/iron Oral Solution (CENTRUM) 15 milliLiter(s) Oral daily  nystatin Powder 1 Application(s) Topical every 8 hours  oxybutynin 5 milliGRAM(s) Oral daily  pantoprazole   Suspension 40 milliGRAM(s) Enteral Tube <User Schedule>  petrolatum Ophthalmic Ointment 1 Application(s) Both EYES four times a day  predniSONE   Tablet 5 milliGRAM(s) Oral daily  QUEtiapine 12.5 milliGRAM(s) Oral <User Schedule>  simethicone 80 milliGRAM(s) Chew four times a day  sodium chloride 3%  Inhalation 4 milliLiter(s) Inhalation every 12 hours  zinc oxide 40% Paste 1 Application(s) Topical daily    MEDICATIONS  (PRN):  acetaminophen   Oral Liquid .. 1000 milliGRAM(s) Enteral Tube every 6 hours PRN Temp greater or equal to 38C (100.4F), Mild Pain (1 - 3)  benzocaine 20% Spray 1 Spray(s) Topical four times a day PRN Cough  diclofenac sodium 1% Gel 2 Gram(s) Topical four times a day PRN pain  diphenhydrAMINE Elixir 25 milliGRAM(s) Oral every 6 hours PRN Rash and/or Itching  guaifenesin/dextromethorphan Oral Liquid 10 milliLiter(s) Oral every 6 hours PRN Cough  oxyCODONE    Solution 2.5 milliGRAM(s) Oral every 6 hours PRN Moderate Pain (4 - 6)  sodium chloride 0.65% Nasal 1 Spray(s) Both Nostrils every 6 hours PRN Nasal Congestion      LABS:                        10.0   12.56 )-----------( 119      ( 01 Dec 2023 12:18 )             33.0     12-01    130<L>  |  94<L>  |  16  ----------------------------<  288<H>  4.4   |  24  |  <0.30<L>    Ca    9.1      01 Dec 2023 12:18  Phos  4.0     12-01  Mg     1.8     12-01    TPro  7.1  /  Alb  3.2<L>  /  TBili  0.5  /  DBili  x   /  AST  27  /  ALT  28  /  AlkPhos  58  12-01      Urinalysis Basic - ( 01 Dec 2023 12:18 )    Color: x / Appearance: x / SG: x / pH: x  Gluc: 288 mg/dL / Ketone: x  / Bili: x / Urobili: x   Blood: x / Protein: x / Nitrite: x   Leuk Esterase: x / RBC: x / WBC x   Sq Epi: x / Non Sq Epi: x / Bacteria: x          CAPILLARY BLOOD GLUCOSE    MICROBIOLOGY:     RADIOLOGY:  [ ] Reviewed and interpreted by me    Mode: AC/ CMV (Assist Control/ Continuous Mandatory Ventilation)  RR (machine): 12  TV (machine): 300  FiO2: 30  PEEP: 5  ITime: 1  MAP: 9  PIP: 29

## 2023-12-02 NOTE — PROGRESS NOTE ADULT - NS ATTEND AMEND GEN_ALL_CORE FT
73F with a h/o DM, HTN, HLD, anemia, hypothyroidism, RA, fibromyalgia, remote cerebral aneurysm with repair, hypercapnic respiratory failure s/p tracheostomy/PEG, bedbound, sacral pressure ulcer. Admitted w/ bleeding from tracheostomy and PEG w/ associated abdominal pain, recent hx of auditory/visual hallucinations. Since admission, no further bleeding noted from either trach or PEG. Imaging showed mild thickening along PEG tube tract and sacral ulcer extending to coccyx suggestive of OM.     # Osteomyelitis  # Chronic hypoxemic RF  # oropharyngeal dysphagia  # acute on chronic pain  # Trach/Peg bleeding  # Tracheitis with Pseudomonas and serratia  # Hx of hallucination, anxiety - particularly in setting of prior antibiotics    #Neuro - awake, alert, and interactive. moving all extremities  - Patient presently calm and following commands appropriately. Continue current medications  - Behavioral Health Follow-up as needed   - Sleep remains poor - increased dose of Melatonin (6mg) last night  #Cardiovascular - hemodynamically stable  #Pulmonary - acute on chronic hypoxemic respiratory failure - on home vent after extensive hospitalizations over the prior year since being trached last December - PSV as able though still requiring high support  - Patient phonating while on vent this AM - receiving full volumes on vent  - ENT follow-up as needed - previously changed to 8XLT trach - daughter was upset about previous trach changes but presently patient ventilating in stable fashion on current settings  - Maintain O2 sat > 90%  - Daughter requesting home CO2 monitoring device as unable have ABGs done at home as patient has a history of CO2 increase off ventilator  - Continue hypersal for now  #Gastro - oropharyngeal dysphagia with PEG tube in place - ensure appropriate feeds with Nutrition  - No further diarrhea/loose BM today since stool softeners stopped  - GI evaluation and AXR noted from earlier this week - no signs of obstruction  - More abdominal pain today which is not resolving with gas - therefore will check abd US per d/w GI. Repeat LFTs today.  - History of c. diff previously treated per daughter - if diarrhea recurs off stool softeners will need to repeat testing for this.   #Infectious Disease - sacral osteo based on MRI - wound care follow-up (per daughter had debridement in past at another hospitalization)  - On review of wound care notes there does appear to be some improvement in at least one dimension of wound from admission measurements. The patient has had this wound since a hospitalization in December and per daughter does not have chronic or multiple wounds but only this single wound. Unclear if wound culture is needed will f/u with wound team.  - Continue Cipro and Keflex ass per ID - with plan for 6 week course in total. Will continue Cipro in D5 despite hyperglycemia given improved absorption compared to PO per d/w ID and pharmacy  - Per daughter, patient has had prior multiple infections during hospitalizations including prior pseudomonas, MRSA, E faecalis, and Klebsiella  - Sputum now with MDR Pseudomonas - ID follow-up however would consider holding off targeting this as respiratory status presently stable and no increase in secretions  - Follow-up repeat blood cultures thus far negative. Urine culture (VRE) not likely a true infection per d/w ID  - Daughter reports a recent dental abscess and being told by outpatient dentist that patient needed teeth extracted - Dental evaluation noted with no plans for intervention as inpatient.   - Leukocytosis waxing/waning last few days but no fevers and clinically patient looks improved. Will continue to monitor  #Hem/Onc - prior cytopenias in setting of antibiotics per patient's daughter   - Required PRBC transfusion over the weekend for Hb < 8 but no clear signs of bleeding  - Hem/Onc follow-up noted - suspect an anemia of chronic disease  - Daughter told by a prior Hematologist that her Hb must remain above 8 - though unclear whether this was in relation to any particular finding or just clinical gestalt  #Pain - continue current pain control - in setting of fibromyalgia and pain from wound  - restarted Oxy after discussion with patient's daughter yesterday - improvement noted  #Derm - previously seen by derm for skin lesions - mid back with irritated seborrheic keratosis - outpatient follow-up  #Endo - DM2 - continue insulin and adjust as needed for goal FS < 180  - Endocrine follow-up for assistance with hyperglycemia management  - Continue Synthroid - TSH WNL  #DVT proph - SCD  - Prior prophylactic AC stopped after concern for prior bleeding    Prognosis guarded    I spoke with patient's daughter over the phone this AM during rounds for about 12 minutes. All questions answered. Will continue to work with CM team about discharge planning though at this time there are no accepting [facility] which seems to be a big barrier. IV antibiotics are going to be needed til about 12/10 per ID.   - [facility] team has notified us that they are unable to accept patient for outpatient care due to her level of needs.  I also spoke with patient's daughter a second time in the afternoon to follow-up some earlier discussions. She shares with me that the patient was previously DNR/DNI and she had filled out these forms herself around 2001. She feels that her mother did not want to "sign" the DNR forms here because she was concerned that she may not receive medications or treatments though this was discussed with her earlier by the team that she would still receive all medical treatments. She shared some confliction about mom's long term status and care and wanted reassurance that her mother was not suffering but given that her mom is awake and interactive she would find it hard to "pull the plug". Hopefully the patient's mental status will continue to improve and pain improve to a point where she is able to more fully engage in these discussions in the future. At the present time the patient remains full code. The patient's daughter seems to be amenable to a palliative care evaluation. ACP 18 min over the phone separate from clinical time. 73F with a h/o DM, HTN, HLD, anemia, hypothyroidism, RA, fibromyalgia, remote cerebral aneurysm with repair, hypercapnic respiratory failure s/p tracheostomy/PEG, bedbound, sacral pressure ulcer. Admitted w/ bleeding from tracheostomy and PEG w/ associated abdominal pain, recent hx of auditory/visual hallucinations. Since admission, no further bleeding noted from either trach or PEG. Imaging showed mild thickening along PEG tube tract and sacral ulcer extending to coccyx suggestive of OM.     # Osteomyelitis  # Chronic hypoxemic RF  # oropharyngeal dysphagia  # acute on chronic pain  # Trach/Peg bleeding  # Tracheitis with Pseudomonas and serratia  # Hx of hallucination, anxiety - particularly in setting of prior antibiotics    #Neuro - awake, alert, and interactive. moving all extremities  - Patient presently calm and following commands appropriately. Continue current medications  - Behavioral Health Follow-up as needed   - Sleep remains poor - increased dose of Melatonin (6mg) last night  #Cardiovascular - hemodynamically stable  #Pulmonary - acute on chronic hypoxemic respiratory failure - on home vent after extensive hospitalizations over the prior year since being trached last December - PSV as able though still requiring high support  - Patient phonating while on vent this AM - receiving full volumes on vent  - ENT follow-up as needed - previously changed to 8XLT trach - daughter was upset about previous trach changes but presently patient ventilating in stable fashion on current settings  - Maintain O2 sat > 90%  - Daughter requesting home CO2 monitoring device as unable have ABGs done at home as patient has a history of CO2 increase off ventilator  - Continue hypersal for now  #Gastro - oropharyngeal dysphagia with PEG tube in place - ensure appropriate feeds with Nutrition  - No further diarrhea/loose BM today since stool softeners stopped  - GI evaluation and AXR noted from earlier this week - no signs of obstruction  - More abdominal pain today which is not resolving with gas - therefore will check abd US per d/w GI. Repeat LFTs today.  - History of c. diff previously treated per daughter - if diarrhea recurs off stool softeners will need to repeat testing for this.   #Infectious Disease - sacral osteo based on MRI - wound care follow-up (per daughter had debridement in past at another hospitalization)  - On review of wound care notes there does appear to be some improvement in at least one dimension of wound from admission measurements. The patient has had this wound since a hospitalization in December and per daughter does not have chronic or multiple wounds but only this single wound. Unclear if wound culture is needed will f/u with wound team.  - Continue Cipro and Keflex ass per ID - with plan for 6 week course in total. Will continue Cipro in D5 despite hyperglycemia given improved absorption compared to PO per d/w ID and pharmacy  - Per daughter, patient has had prior multiple infections during hospitalizations including prior pseudomonas, MRSA, E faecalis, and Klebsiella  - Sputum now with MDR Pseudomonas - ID follow-up however would consider holding off targeting this as respiratory status presently stable and no increase in secretions  - Follow-up repeat blood cultures thus far negative. Urine culture (VRE) not likely a true infection per d/w ID  - Daughter reports a recent dental abscess and being told by outpatient dentist that patient needed teeth extracted - Dental evaluation noted with no plans for intervention as inpatient.   - Leukocytosis waxing/waning last few days but no fevers and clinically patient looks improved. Will continue to monitor  #Hem/Onc - prior cytopenias in setting of antibiotics per patient's daughter   - Required PRBC transfusion over the weekend for Hb < 8 but no clear signs of bleeding  - Hem/Onc follow-up noted - suspect an anemia of chronic disease  - Daughter told by a prior Hematologist that her Hb must remain above 8 - though unclear whether this was in relation to any particular finding or just clinical gestalt  #Pain - continue current pain control - in setting of fibromyalgia and pain from wound  - restarted Oxy after discussion with patient's daughter yesterday - improvement noted  #Derm - previously seen by derm for skin lesions - mid back with irritated seborrheic keratosis - outpatient follow-up  #Endo - DM2 - continue insulin and adjust as needed for goal FS < 180  - Endocrine follow-up for assistance with hyperglycemia management  - Continue Synthroid - TSH WNL  #DVT proph - SCD  - Prior prophylactic AC stopped after concern for prior bleeding    Prognosis guarded    I spoke with patient's daughter over the phone this AM during rounds for about 12 minutes. All questions answered. Will continue to work with CM team about discharge planning though at this time there are no accepting [facility] which seems to be a big barrier. IV antibiotics are going to be needed til about 12/10 per ID.   - [facility] team has notified us that they are unable to accept patient for outpatient care due to her level of needs.  I also spoke with patient's daughter a second time in the afternoon to follow-up some earlier discussions. She shares with me that the patient was previously DNR/DNI and she had filled out these forms herself around 2001. She feels that her mother did not want to "sign" the DNR forms here because she was concerned that she may not receive medications or treatments though this was discussed with her earlier by the team that she would still receive all medical treatments. She shared some confliction about mom's long term status and care and wanted reassurance that her mother was not suffering but given that her mom is awake and interactive she would find it hard to "pull the plug". Hopefully the patient's mental status will continue to improve and pain improve to a point where she is able to more fully engage in these discussions in the future. At the present time the patient remains full code. The patient's daughter seems to be amenable to a palliative care evaluation. ACP 18 min over the phone separate from clinical time. 73F with a h/o DM, HTN, HLD, anemia, hypothyroidism, RA, fibromyalgia, remote cerebral aneurysm with repair, hypercapnic respiratory failure s/p tracheostomy/PEG, bedbound, sacral pressure ulcer. Admitted w/ bleeding from tracheostomy and PEG w/ associated abdominal pain, recent hx of auditory/visual hallucinations. Since admission, no further bleeding noted from either trach or PEG. Imaging showed mild thickening along PEG tube tract and sacral ulcer extending to coccyx suggestive of OM.     # Osteomyelitis  # Chronic hypoxemic RF  # oropharyngeal dysphagia  # acute on chronic pain  # Trach/Peg bleeding  # Tracheitis with Pseudomonas and serratia  # Hx of hallucination, anxiety - particularly in setting of prior antibiotics    #Neuro - awake, alert, and interactive. moving all extremities  - Patient presently calm and following commands appropriately. Continue current medications  - Behavioral Health Follow-up as needed   - Sleep remains poor - increased dose of Melatonin (6mg) last night  #Cardiovascular - hemodynamically stable  #Pulmonary - acute on chronic hypoxemic respiratory failure - on home vent after extensive hospitalizations over the prior year since being trached last December - PSV as able though still requiring high support  - Patient phonating while on vent this AM - receiving full volumes on vent  - ENT follow-up as needed - previously changed to 8XLT trach - daughter was upset about previous trach changes but presently patient ventilating in stable fashion on current settings  - Maintain O2 sat > 90%  - Daughter requesting home CO2 monitoring device as unable have ABGs done at home as patient has a history of CO2 increase off ventilator  - Continue hypersal for now  #Gastro - oropharyngeal dysphagia with PEG tube in place - ensure appropriate feeds with Nutrition  - No further diarrhea/loose BM today since stool softeners stopped  - GI evaluation and AXR noted from earlier this week - no signs of obstruction  - More abdominal pain today which is not resolving with gas - therefore will check abd US per d/w GI. Repeat LFTs today.  - History of c. diff previously treated per daughter - if diarrhea recurs off stool softeners will need to repeat testing for this.   #Infectious Disease - sacral osteo based on MRI - wound care follow-up (per daughter had debridement in past at another hospitalization)  - On review of wound care notes there does appear to be some improvement in at least one dimension of wound from admission measurements. The patient has had this wound since a hospitalization in December and per daughter does not have chronic or multiple wounds but only this single wound. Unclear if wound culture is needed will f/u with wound team.  - Continue Cipro and Keflex ass per ID - with plan for 6 week course in total. Will continue Cipro in D5 despite hyperglycemia given improved absorption compared to PO per d/w ID and pharmacy  - Per daughter, patient has had prior multiple infections during hospitalizations including prior pseudomonas, MRSA, E faecalis, and Klebsiella  - Sputum now with MDR Pseudomonas - ID follow-up however would consider holding off targeting this as respiratory status presently stable and no increase in secretions  - Follow-up repeat blood cultures thus far negative. Urine culture (VRE) not likely a true infection per d/w ID  - Daughter reports a recent dental abscess and being told by outpatient dentist that patient needed teeth extracted - Dental evaluation noted with no plans for intervention as inpatient.   - Leukocytosis waxing/waning last few days but no fevers and clinically patient looks improved. Will continue to monitor  #Hem/Onc - prior cytopenias in setting of antibiotics per patient's daughter   - Required PRBC transfusion over the weekend for Hb < 8 but no clear signs of bleeding  - Hem/Onc follow-up noted - suspect an anemia of chronic disease  - Daughter told by a prior Hematologist that her Hb must remain above 8 - though unclear whether this was in relation to any particular finding or just clinical gestalt  #Pain - continue current pain control - in setting of fibromyalgia and pain from wound  - restarted Oxy after discussion with patient's daughter yesterday - improvement noted  #Derm - previously seen by derm for skin lesions - mid back with irritated seborrheic keratosis - outpatient follow-up  #Endo - DM2 - continue insulin and adjust as needed for goal FS < 180  - Endocrine follow-up for assistance with hyperglycemia management  - Continue Synthroid - TSH WNL  #DVT proph - SCD  - Prior prophylactic AC stopped after concern for prior bleeding    Prognosis guarded    I spoke with patient's daughter over the phone this AM during rounds for about 12 minutes. All questions answered. Will continue to work with CM team about discharge planning though at this time there are no accepting [facility] which seems to be a big barrier. IV antibiotics are going to be needed til about 12/10 per ID.   - [facility] team has notified us that they are unable to accept patient for outpatient care due to her level of needs.  I also spoke with patient's daughter a second time in the afternoon to follow-up some earlier discussions. She shares with me that the patient was previously DNR/DNI and she had filled out these forms herself around 2001. She feels that her mother did not want to "sign" the DNR forms here because she was concerned that she may not receive medications or treatments though this was discussed with her earlier by the team that she would still receive all medical treatments. She shared some confliction about mom's long term status and care and wanted reassurance that her mother was not suffering but given that her mom is awake and interactive she would find it hard to "pull the plug". Hopefully the patient's mental status will continue to improve and pain improve to a point where she is able to more fully engage in these discussions in the future. At the present time the patient remains full code. The patient's daughter seems to be amenable to a palliative care evaluation. ACP 18 min over the phone separate from clinical time. 73F with a h/o DM, HTN, HLD, anemia, hypothyroidism, RA, fibromyalgia, remote cerebral aneurysm with repair, hypercapnic respiratory failure s/p tracheostomy/PEG, bedbound, sacral pressure ulcer. Admitted w/ bleeding from tracheostomy and PEG w/ associated abdominal pain, recent hx of auditory/visual hallucinations. Since admission, no further bleeding noted from either trach or PEG. Imaging showed mild thickening along PEG tube tract and sacral ulcer extending to coccyx suggestive of OM.     # Osteomyelitis  # Chronic hypoxemic RF  # oropharyngeal dysphagia  # acute on chronic pain  # Trach/Peg bleeding  # Tracheitis with Pseudomonas and serratia  # Hx of hallucination, anxiety - particularly in setting of prior antibiotics    #Neuro - awake, alert, and interactive. moving all extremities  - Patient presently calm and following commands appropriately. Continue current medications  - Behavioral Health Follow-up as needed   - Sleep remains poor - increased dose of Melatonin (6mg) last night  #Cardiovascular - hemodynamically stable  #Pulmonary - acute on chronic hypoxemic respiratory failure - on home vent after extensive hospitalizations over the prior year since being trached last December - PSV as able though still requiring high support  - Patient phonating while on vent this AM - receiving full volumes on vent  - ENT follow-up as needed - previously changed to 8XLT trach - daughter was upset about previous trach changes but presently patient ventilating in stable fashion on current settings  - Maintain O2 sat > 90%  - Daughter requesting home CO2 monitoring device as unable have ABGs done at home as patient has a history of CO2 increase off ventilator  - Continue hypersal for now  #Gastro - oropharyngeal dysphagia with PEG tube in place - ensure appropriate feeds with Nutrition  - No further diarrhea/loose BM today since stool softeners stopped  - GI evaluation and AXR noted from earlier this week - no signs of obstruction  - More abdominal pain today which is not resolving with gas - therefore will check abd US per d/w GI. Repeat LFTs today.  - History of c. diff previously treated per daughter - if diarrhea recurs off stool softeners will need to repeat testing for this.   #Infectious Disease - sacral osteo based on MRI - wound care follow-up (per daughter had debridement in past at another hospitalization)  - On review of wound care notes there does appear to be some improvement in at least one dimension of wound from admission measurements. The patient has had this wound since a hospitalization in December and per daughter does not have chronic or multiple wounds but only this single wound. Unclear if wound culture is needed will f/u with wound team.  - Continue Cipro and Keflex ass per ID - with plan for 6 week course in total. Will continue Cipro in D5 despite hyperglycemia given improved absorption compared to PO per d/w ID and pharmacy  - Per daughter, patient has had prior multiple infections during hospitalizations including prior pseudomonas, MRSA, E faecalis, and Klebsiella  - Sputum now with MDR Pseudomonas - ID follow-up however would consider holding off targeting this as respiratory status presently stable and no increase in secretions  - Follow-up repeat blood cultures thus far negative. Urine culture (VRE) not likely a true infection per d/w ID  - Daughter reports a recent dental abscess and being told by outpatient dentist that patient needed teeth extracted - Dental evaluation noted with no plans for intervention as inpatient.   - Leukocytosis waxing/waning last few days but no fevers and clinically patient looks improved. Will continue to monitor  #Hem/Onc - prior cytopenias in setting of antibiotics per patient's daughter   - Required PRBC transfusion over the weekend for Hb < 8 but no clear signs of bleeding  - Hem/Onc follow-up noted - suspect an anemia of chronic disease  - Daughter told by a prior Hematologist that her Hb must remain above 8 - though unclear whether this was in relation to any particular finding or just clinical gestalt  #Pain - continue current pain control - in setting of fibromyalgia and pain from wound  - restarted Oxy after discussion with patient's daughter yesterday - improvement noted  #Derm - previously seen by derm for skin lesions - mid back with irritated seborrheic keratosis - outpatient follow-up  #Endo - DM2 - continue insulin and adjust as needed for goal FS < 180  - Endocrine follow-up for assistance with hyperglycemia management  - Continue Synthroid - TSH WNL  #DVT proph - SCD  - Prior prophylactic AC stopped after concern for prior bleeding    Prognosis guarded

## 2023-12-02 NOTE — PROGRESS NOTE ADULT - ASSESSMENT
73 year old female with respiratory failure s/p trach and PEG, sacral osteomyelitis.    Abdominal pain   RUQ tenderness on exam, check US abdomen.     Erratic nature of bowel movements  there are numerous factors ranging from debility, antibiotics, and tube feeds.   laxatives to be given only PRN; bowel movements will continue to be inconsistent due to the aforementioned factors.    PEG   functioning well

## 2023-12-02 NOTE — PROGRESS NOTE ADULT - PROBLEM SELECTOR PLAN 9
DVT PPx: Lovenox discontinued given daughter's concern for trach and PEG stoma bleeding/oozing.  Also with mild thrombocytopenia.  Will continue SCDs.     GOC: Full code  __ long discussion of RCU team with daughter, pt remains FULL CODE, daughter wishes MOLST form reevaluation once pt is less delirious

## 2023-12-02 NOTE — PROGRESS NOTE ADULT - SUBJECTIVE AND OBJECTIVE BOX
INTERVAL HPI/OVERNIGHT EVENTS:      Tolerating PEG feeds.          acetaminophen   Oral Liquid .. 1000 milliGRAM(s) Enteral Tube every 6 hours PRN  albuterol/ipratropium for Nebulization 3 milliLiter(s) Nebulizer every 6 hours  amLODIPine   Tablet 10 milliGRAM(s) Oral daily  artificial  tears Solution 2 Drop(s) Both EYES four times a day  ascorbic acid 500 milliGRAM(s) Oral daily  benzocaine 20% Spray 1 Spray(s) Topical four times a day PRN  calcium carbonate    500 mG (Tums) Chewable 1 Tablet(s) Chew every 12 hours  cephalexin 500 milliGRAM(s) Oral every 6 hours  chlorhexidine 0.12% Liquid 15 milliLiter(s) Oral Mucosa every 12 hours  chlorhexidine 4% Liquid 1 Application(s) Topical <User Schedule>  ciprofloxacin   IVPB 400 milliGRAM(s) IV Intermittent every 12 hours  dextrose 50% Injectable 12.5 Gram(s) IV Push once  dextrose 50% Injectable 25 Gram(s) IV Push once  dextrose 50% Injectable 50 milliLiter(s) IV Push once  dextrose 50% Injectable 25 Gram(s) IV Push once  dextrose Oral Gel 15 Gram(s) Oral once  diclofenac sodium 1% Gel 2 Gram(s) Topical four times a day PRN  diphenhydrAMINE Elixir 25 milliGRAM(s) Oral every 6 hours PRN  escitalopram 10 milliGRAM(s) Oral <User Schedule>  gabapentin Solution 100 milliGRAM(s) Enteral Tube at bedtime  glucagon  Injectable 1 milliGRAM(s) IntraMuscular once  guaifenesin/dextromethorphan Oral Liquid 10 milliLiter(s) Oral every 6 hours PRN  hemorrhoidal Ointment 1 Application(s) Rectal daily  hemorrhoidal Ointment      insulin glargine Injectable (LANTUS) 20 Unit(s) SubCutaneous every morning  insulin lispro (ADMELOG) corrective regimen sliding scale   SubCutaneous every 6 hours  insulin regular  human recombinant 19 Unit(s) SubCutaneous <User Schedule>  insulin regular  human recombinant 15 Unit(s) SubCutaneous <User Schedule>  levothyroxine 125 MICROGram(s) Oral <User Schedule>  lidocaine   4% Patch 1 Patch Transdermal daily  melatonin 5 milliGRAM(s) Oral at bedtime  melatonin 1 milliGRAM(s) Oral at bedtime  multivitamin/minerals/iron Oral Solution (CENTRUM) 15 milliLiter(s) Oral daily  nystatin Powder 1 Application(s) Topical every 8 hours  oxybutynin 5 milliGRAM(s) Oral daily  oxyCODONE    Solution 2.5 milliGRAM(s) Oral every 6 hours PRN  pantoprazole   Suspension 40 milliGRAM(s) Enteral Tube <User Schedule>  petrolatum Ophthalmic Ointment 1 Application(s) Both EYES four times a day  predniSONE   Tablet 5 milliGRAM(s) Oral daily  QUEtiapine 12.5 milliGRAM(s) Oral <User Schedule>  simethicone 80 milliGRAM(s) Chew four times a day  sodium chloride 0.65% Nasal 1 Spray(s) Both Nostrils every 6 hours PRN  sodium chloride 3%  Inhalation 4 milliLiter(s) Inhalation every 12 hours  zinc oxide 40% Paste 1 Application(s) Topical daily                            10.3   10.50 )-----------( 107      ( 02 Dec 2023 11:33 )             33.3       Hemoglobin: 10.3 g/dL (12-02 @ 11:33)  Hemoglobin: 10.0 g/dL (12-01 @ 12:18)  Hemoglobin: 10.7 g/dL (11-30 @ 07:13)  Hemoglobin: 10.4 g/dL (11-29 @ 06:38)  Hemoglobin: 9.5 g/dL (11-28 @ 06:36)      12-01    130<L>  |  94<L>  |  16  ----------------------------<  288<H>  4.4   |  24  |  <0.30<L>    Ca    9.1      01 Dec 2023 12:18  Phos  4.0     12-01  Mg     1.8     12-01    TPro  7.1  /  Alb  3.2<L>  /  TBili  0.5  /  DBili  x   /  AST  27  /  ALT  28  /  AlkPhos  58  12-01    Creatinine Trend: <0.30<--, <0.30<--, <0.30<--, <0.30<--, <0.30<--, <0.30<--    COAGS:           T(C): 37.2 (12-02-23 @ 04:24), Max: 37.3 (12-01-23 @ 13:36)  HR: 88 (12-02-23 @ 08:21) (80 - 92)  BP: 155/65 (12-02-23 @ 04:24) (108/50 - 155/65)  RR: 14 (12-02-23 @ 04:24) (14 - 20)  SpO2: 99% (12-02-23 @ 08:21) (99% - 100%)  Wt(kg): --    I&O's Summary    01 Dec 2023 07:01  -  02 Dec 2023 07:00  --------------------------------------------------------  IN: 1480 mL / OUT: 1350 mL / NET: 130 mL          Review of Systems:    unable to obtain   PHYSICAL EXAM:  GENERAL:   no distress  HEENT:  NC/AT,  conjunctivae anicteric, clear and pink, trach  NECK: supple, trachea midline  CHEST:  Full & symmetric excursion, no increased effort, breath sounds clear  HEART:  Regular rhythm, no JVD  ABDOMEN:  Soft, non-tender, non-distended, normoactive bowel sounds,  no masses , no hepatosplenomegaly, G tube with granulation tissue  EXTREMITIES:  no cyanosis, clubbing or edema  SKIN:  No rash, erythema, or, ecchymoses, no jaundice  NEURO:  Alert, non-focal, no asterixis

## 2023-12-02 NOTE — PROGRESS NOTE ADULT - PROBLEM SELECTOR PLAN 4
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   - Bowel regimen  - PEG Site appears macerated. Multifactorial, possible the PEG has been too tight at home.  - Peg loosened by GI at beside, no leakage or further intervention required   - recurrent RLQ abdominal pain and bleeding at the PEG site  - reevaluated by GI -- no bleeding at the PEG site  - no BM since 11/19 -- increased dose of Senna and increased Miralax to BID   - 11/28 frequent multiple BMs, senna and Miralax d/c'd - GI evaluated  - Continue Simethicone.  - 12/1: pending abdominal ultrasound 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   - Bowel regimen  - PEG Site appears macerated. Multifactorial, possible the PEG has been too tight at home.  - Peg loosened by GI at beside, no leakage or further intervention required   - recurrent RLQ abdominal pain and bleeding at the PEG site  - reevaluated by GI -- no bleeding at the PEG site  - no BM since 11/19 -- increased dose of Senna and increased Miralax to BID   - 11/28 frequent multiple BMs, senna and Miralax d/c'd - GI evaluated  - Continue Simethicone.  - 12/1: s/p abdominal ultrasound - limited study - CT A/P if signs of worsening abdominal pain

## 2023-12-02 NOTE — PROGRESS NOTE ADULT - ASSESSMENT
74 y/o F with PMHx of T2DM, HTN, HLD, anemia, hypothyroidism, RA, fibromyalgia, remote cerebral aneurysm repair, acute hypercapnic respiratory failure now with tracheostomy, PEG tube, and bedbound (trach change 8/18, PEG change 8/14), sacral pressure ulcer, BIBEMS from home for 6 day hx of bleeding from the tracheostomy and PEG tube with associated abdominal pain, recent history of auditory and visual hallucinations, and with chronic sacral decubitus ulcer. Exam notable for mild abdominal distention with LLQ and RLQ tenderness, tracheostomy in place with no obvious bleeding, PEG tube with scant amount of dried blood, at baseline neurologic status. Imaging showing no active bleeding around tracheostomy site, however was notable for mild thickening along PEG tube tract, sacral ulcer extending to the coccyx suggestive of possible osteomyelitis, and bibasilar patchy lung opacities with volume loss. Patient admitted for respiratory support, wound care of tracheostomy and PEG, and management of sacral osteomyelitis. No further Trach and peg site  bleeding noted since admission.  Pelvic MRI imaging also suggestive of Acute OM. Patient placed on long-term antibiotics with Infectious disease guidance.  Additionally treated with a 7d course of Cefepime due to PSA and Serratia tracheitis on 11/8-->11/15 and then resumed cipro/keflex.  Hospital course c/b Delirium for which Seroquel was added and home Lexapro continued. Tracheostomy changed to #9 Cuffed [place] by ENT 11/3.  Despite trach change patient remained with persistent air leak.  On 11/19, the trach was again changed due to leak and loss of volumes on vent.  Trach was changed to #8 distal cuffed [place].  Patient seen by Dermatology for back lesions.  One diagnosed as a seborrheic keratitis and the other a dermatofibroma.     11/30: No issues  12/1: only on pressure support for a short time (18/5). Will obtain abdominal ultrasound to better evaluate abdominal pain 72 y/o F with PMHx of T2DM, HTN, HLD, anemia, hypothyroidism, RA, fibromyalgia, remote cerebral aneurysm repair, acute hypercapnic respiratory failure now with tracheostomy, PEG tube, and bedbound (trach change 8/18, PEG change 8/14), sacral pressure ulcer, BIBEMS from home for 6 day hx of bleeding from the tracheostomy and PEG tube with associated abdominal pain, recent history of auditory and visual hallucinations, and with chronic sacral decubitus ulcer. Exam notable for mild abdominal distention with LLQ and RLQ tenderness, tracheostomy in place with no obvious bleeding, PEG tube with scant amount of dried blood, at baseline neurologic status. Imaging showing no active bleeding around tracheostomy site, however was notable for mild thickening along PEG tube tract, sacral ulcer extending to the coccyx suggestive of possible osteomyelitis, and bibasilar patchy lung opacities with volume loss. Patient admitted for respiratory support, wound care of tracheostomy and PEG, and management of sacral osteomyelitis. No further Trach and peg site  bleeding noted since admission.  Pelvic MRI imaging also suggestive of Acute OM. Patient placed on long-term antibiotics with Infectious disease guidance.  Additionally treated with a 7d course of Cefepime due to PSA and Serratia tracheitis on 11/8-->11/15 and then resumed cipro/keflex.  Hospital course c/b Delirium for which Seroquel was added and home Lexapro continued. Tracheostomy changed to #9 Cuffed [place] by ENT 11/3.  Despite trach change patient remained with persistent air leak.  On 11/19, the trach was again changed due to leak and loss of volumes on vent.  Trach was changed to #8 distal cuffed [place].  Patient seen by Dermatology for back lesions.  One diagnosed as a seborrheic keratitis and the other a dermatofibroma.     11/30: No issues  12/1: only on pressure support for a short time (18/5). Will obtain abdominal ultrasound to better evaluate abdominal pain 72 y/o F with PMHx of T2DM, HTN, HLD, anemia, hypothyroidism, RA, fibromyalgia, remote cerebral aneurysm repair, acute hypercapnic respiratory failure now with tracheostomy, PEG tube, and bedbound (trach change 8/18, PEG change 8/14), sacral pressure ulcer, BIBEMS from home for 6 day hx of bleeding from the tracheostomy and PEG tube with associated abdominal pain, recent history of auditory and visual hallucinations, and with chronic sacral decubitus ulcer. Exam notable for mild abdominal distention with LLQ and RLQ tenderness, tracheostomy in place with no obvious bleeding, PEG tube with scant amount of dried blood, at baseline neurologic status. Imaging showing no active bleeding around tracheostomy site, however was notable for mild thickening along PEG tube tract, sacral ulcer extending to the coccyx suggestive of possible osteomyelitis, and bibasilar patchy lung opacities with volume loss. Patient admitted for respiratory support, wound care of tracheostomy and PEG, and management of sacral osteomyelitis. No further Trach and peg site  bleeding noted since admission.  Pelvic MRI imaging also suggestive of Acute OM. Patient placed on long-term antibiotics with Infectious disease guidance.  Additionally treated with a 7d course of Cefepime due to PSA and Serratia tracheitis on 11/8-->11/15 and then resumed cipro/keflex.  Hospital course c/b Delirium for which Seroquel was added and home Lexapro continued. Tracheostomy changed to #9 Cuffed [place] by ENT 11/3.  Despite trach change patient remained with persistent air leak.  On 11/19, the trach was again changed due to leak and loss of volumes on vent.  Trach was changed to #8 distal cuffed [place]iley.  Patient seen by Dermatology for back lesions.  One diagnosed as a seborrheic keratitis and the other a dermatofibroma.       12/2: s/p abdominal ultrasound - limited study, no definite sonographic evidence of acute cholecystitis.  Daughter concerned pt is more sleepy - on exam, pt appears at her baseline, intermittently resting but easily arousable and follows commands.   74 y/o F with PMHx of T2DM, HTN, HLD, anemia, hypothyroidism, RA, fibromyalgia, remote cerebral aneurysm repair, acute hypercapnic respiratory failure now with tracheostomy, PEG tube, and bedbound (trach change 8/18, PEG change 8/14), sacral pressure ulcer, BIBEMS from home for 6 day hx of bleeding from the tracheostomy and PEG tube with associated abdominal pain, recent history of auditory and visual hallucinations, and with chronic sacral decubitus ulcer. Exam notable for mild abdominal distention with LLQ and RLQ tenderness, tracheostomy in place with no obvious bleeding, PEG tube with scant amount of dried blood, at baseline neurologic status. Imaging showing no active bleeding around tracheostomy site, however was notable for mild thickening along PEG tube tract, sacral ulcer extending to the coccyx suggestive of possible osteomyelitis, and bibasilar patchy lung opacities with volume loss. Patient admitted for respiratory support, wound care of tracheostomy and PEG, and management of sacral osteomyelitis. No further Trach and peg site  bleeding noted since admission.  Pelvic MRI imaging also suggestive of Acute OM. Patient placed on long-term antibiotics with Infectious disease guidance.  Additionally treated with a 7d course of Cefepime due to PSA and Serratia tracheitis on 11/8-->11/15 and then resumed cipro/keflex.  Hospital course c/b Delirium for which Seroquel was added and home Lexapro continued. Tracheostomy changed to #9 Cuffed [place] by ENT 11/3.  Despite trach change patient remained with persistent air leak.  On 11/19, the trach was again changed due to leak and loss of volumes on vent.  Trach was changed to #8 distal cuffed [place].  Patient seen by Dermatology for back lesions.  One diagnosed as a seborrheic keratitis and the other a dermatofibroma.       12/2: s/p abdominal ultrasound - limited study, no definite sonographic evidence of acute cholecystitis.  Daughter concerned pt is more sleepy - on exam, pt appears at her baseline, intermittently resting but easily arousable and follows commands.

## 2023-12-03 LAB
ALBUMIN SERPL ELPH-MCNC: 3.3 G/DL — SIGNIFICANT CHANGE UP (ref 3.3–5)
ALBUMIN SERPL ELPH-MCNC: 3.3 G/DL — SIGNIFICANT CHANGE UP (ref 3.3–5)
ALP SERPL-CCNC: 50 U/L — SIGNIFICANT CHANGE UP (ref 40–120)
ALP SERPL-CCNC: 50 U/L — SIGNIFICANT CHANGE UP (ref 40–120)
ALT FLD-CCNC: 22 U/L — SIGNIFICANT CHANGE UP (ref 10–45)
ALT FLD-CCNC: 22 U/L — SIGNIFICANT CHANGE UP (ref 10–45)
AMYLASE P1 CFR SERPL: 48 U/L — SIGNIFICANT CHANGE UP (ref 25–125)
AMYLASE P1 CFR SERPL: 48 U/L — SIGNIFICANT CHANGE UP (ref 25–125)
ANION GAP SERPL CALC-SCNC: 12 MMOL/L — SIGNIFICANT CHANGE UP (ref 5–17)
ANION GAP SERPL CALC-SCNC: 12 MMOL/L — SIGNIFICANT CHANGE UP (ref 5–17)
AST SERPL-CCNC: 22 U/L — SIGNIFICANT CHANGE UP (ref 10–40)
AST SERPL-CCNC: 22 U/L — SIGNIFICANT CHANGE UP (ref 10–40)
BILIRUB SERPL-MCNC: 0.4 MG/DL — SIGNIFICANT CHANGE UP (ref 0.2–1.2)
BILIRUB SERPL-MCNC: 0.4 MG/DL — SIGNIFICANT CHANGE UP (ref 0.2–1.2)
BUN SERPL-MCNC: 20 MG/DL — SIGNIFICANT CHANGE UP (ref 7–23)
BUN SERPL-MCNC: 20 MG/DL — SIGNIFICANT CHANGE UP (ref 7–23)
CALCIUM SERPL-MCNC: 9.5 MG/DL — SIGNIFICANT CHANGE UP (ref 8.4–10.5)
CALCIUM SERPL-MCNC: 9.5 MG/DL — SIGNIFICANT CHANGE UP (ref 8.4–10.5)
CHLORIDE SERPL-SCNC: 99 MMOL/L — SIGNIFICANT CHANGE UP (ref 96–108)
CHLORIDE SERPL-SCNC: 99 MMOL/L — SIGNIFICANT CHANGE UP (ref 96–108)
CO2 SERPL-SCNC: 27 MMOL/L — SIGNIFICANT CHANGE UP (ref 22–31)
CO2 SERPL-SCNC: 27 MMOL/L — SIGNIFICANT CHANGE UP (ref 22–31)
CREAT SERPL-MCNC: <0.3 MG/DL — LOW (ref 0.5–1.3)
CREAT SERPL-MCNC: <0.3 MG/DL — LOW (ref 0.5–1.3)
EGFR: 113 ML/MIN/1.73M2 — SIGNIFICANT CHANGE UP
EGFR: 113 ML/MIN/1.73M2 — SIGNIFICANT CHANGE UP
GLUCOSE BLDC GLUCOMTR-MCNC: 152 MG/DL — HIGH (ref 70–99)
GLUCOSE BLDC GLUCOMTR-MCNC: 152 MG/DL — HIGH (ref 70–99)
GLUCOSE BLDC GLUCOMTR-MCNC: 161 MG/DL — HIGH (ref 70–99)
GLUCOSE BLDC GLUCOMTR-MCNC: 161 MG/DL — HIGH (ref 70–99)
GLUCOSE BLDC GLUCOMTR-MCNC: 227 MG/DL — HIGH (ref 70–99)
GLUCOSE BLDC GLUCOMTR-MCNC: 227 MG/DL — HIGH (ref 70–99)
GLUCOSE BLDC GLUCOMTR-MCNC: 85 MG/DL — SIGNIFICANT CHANGE UP (ref 70–99)
GLUCOSE BLDC GLUCOMTR-MCNC: 85 MG/DL — SIGNIFICANT CHANGE UP (ref 70–99)
GLUCOSE SERPL-MCNC: 142 MG/DL — HIGH (ref 70–99)
GLUCOSE SERPL-MCNC: 142 MG/DL — HIGH (ref 70–99)
HCT VFR BLD CALC: 32.5 % — LOW (ref 34.5–45)
HCT VFR BLD CALC: 32.5 % — LOW (ref 34.5–45)
HGB BLD-MCNC: 9.7 G/DL — LOW (ref 11.5–15.5)
HGB BLD-MCNC: 9.7 G/DL — LOW (ref 11.5–15.5)
LIDOCAIN IGE QN: 46 U/L — SIGNIFICANT CHANGE UP (ref 7–60)
LIDOCAIN IGE QN: 46 U/L — SIGNIFICANT CHANGE UP (ref 7–60)
MAGNESIUM SERPL-MCNC: 1.8 MG/DL — SIGNIFICANT CHANGE UP (ref 1.6–2.6)
MAGNESIUM SERPL-MCNC: 1.8 MG/DL — SIGNIFICANT CHANGE UP (ref 1.6–2.6)
MCHC RBC-ENTMCNC: 28.1 PG — SIGNIFICANT CHANGE UP (ref 27–34)
MCHC RBC-ENTMCNC: 28.1 PG — SIGNIFICANT CHANGE UP (ref 27–34)
MCHC RBC-ENTMCNC: 29.8 GM/DL — LOW (ref 32–36)
MCHC RBC-ENTMCNC: 29.8 GM/DL — LOW (ref 32–36)
MCV RBC AUTO: 94.2 FL — SIGNIFICANT CHANGE UP (ref 80–100)
MCV RBC AUTO: 94.2 FL — SIGNIFICANT CHANGE UP (ref 80–100)
NRBC # BLD: 0 /100 WBCS — SIGNIFICANT CHANGE UP (ref 0–0)
NRBC # BLD: 0 /100 WBCS — SIGNIFICANT CHANGE UP (ref 0–0)
PHOSPHATE SERPL-MCNC: 5 MG/DL — HIGH (ref 2.5–4.5)
PHOSPHATE SERPL-MCNC: 5 MG/DL — HIGH (ref 2.5–4.5)
PLATELET # BLD AUTO: 127 K/UL — LOW (ref 150–400)
PLATELET # BLD AUTO: 127 K/UL — LOW (ref 150–400)
POTASSIUM SERPL-MCNC: 3.9 MMOL/L — SIGNIFICANT CHANGE UP (ref 3.5–5.3)
POTASSIUM SERPL-MCNC: 3.9 MMOL/L — SIGNIFICANT CHANGE UP (ref 3.5–5.3)
POTASSIUM SERPL-SCNC: 3.9 MMOL/L — SIGNIFICANT CHANGE UP (ref 3.5–5.3)
POTASSIUM SERPL-SCNC: 3.9 MMOL/L — SIGNIFICANT CHANGE UP (ref 3.5–5.3)
PROT SERPL-MCNC: 7 G/DL — SIGNIFICANT CHANGE UP (ref 6–8.3)
PROT SERPL-MCNC: 7 G/DL — SIGNIFICANT CHANGE UP (ref 6–8.3)
RBC # BLD: 3.45 M/UL — LOW (ref 3.8–5.2)
RBC # BLD: 3.45 M/UL — LOW (ref 3.8–5.2)
RBC # FLD: 16 % — HIGH (ref 10.3–14.5)
RBC # FLD: 16 % — HIGH (ref 10.3–14.5)
SODIUM SERPL-SCNC: 138 MMOL/L — SIGNIFICANT CHANGE UP (ref 135–145)
SODIUM SERPL-SCNC: 138 MMOL/L — SIGNIFICANT CHANGE UP (ref 135–145)
WBC # BLD: 8.45 K/UL — SIGNIFICANT CHANGE UP (ref 3.8–10.5)
WBC # BLD: 8.45 K/UL — SIGNIFICANT CHANGE UP (ref 3.8–10.5)
WBC # FLD AUTO: 8.45 K/UL — SIGNIFICANT CHANGE UP (ref 3.8–10.5)
WBC # FLD AUTO: 8.45 K/UL — SIGNIFICANT CHANGE UP (ref 3.8–10.5)

## 2023-12-03 PROCEDURE — 99233 SBSQ HOSP IP/OBS HIGH 50: CPT | Mod: FS,GC

## 2023-12-03 RX ORDER — INSULIN HUMAN 100 [IU]/ML
11 INJECTION, SOLUTION SUBCUTANEOUS
Refills: 0 | Status: DISCONTINUED | OUTPATIENT
Start: 2023-12-04 | End: 2023-12-04

## 2023-12-03 RX ORDER — INSULIN HUMAN 100 [IU]/ML
20 INJECTION, SOLUTION SUBCUTANEOUS
Refills: 0 | Status: DISCONTINUED | OUTPATIENT
Start: 2023-12-04 | End: 2023-12-04

## 2023-12-03 RX ORDER — INSULIN HUMAN 100 [IU]/ML
13 INJECTION, SOLUTION SUBCUTANEOUS
Refills: 0 | Status: DISCONTINUED | OUTPATIENT
Start: 2023-12-03 | End: 2023-12-03

## 2023-12-03 RX ADMIN — ESCITALOPRAM OXALATE 10 MILLIGRAM(S): 10 TABLET, FILM COATED ORAL at 09:21

## 2023-12-03 RX ADMIN — Medication 5 MILLIGRAM(S): at 05:21

## 2023-12-03 RX ADMIN — PHENYLEPHRINE-SHARK LIVER OIL-MINERAL OIL-PETROLATUM RECTAL OINTMENT 1 APPLICATION(S): at 11:51

## 2023-12-03 RX ADMIN — NYSTATIN CREAM 1 APPLICATION(S): 100000 CREAM TOPICAL at 15:13

## 2023-12-03 RX ADMIN — INSULIN GLARGINE 20 UNIT(S): 100 INJECTION, SOLUTION SUBCUTANEOUS at 05:54

## 2023-12-03 RX ADMIN — Medication 200 MILLIGRAM(S): at 17:57

## 2023-12-03 RX ADMIN — Medication 1 APPLICATION(S): at 11:49

## 2023-12-03 RX ADMIN — Medication 500 MILLIGRAM(S): at 23:31

## 2023-12-03 RX ADMIN — INSULIN HUMAN 19 UNIT(S): 100 INJECTION, SOLUTION SUBCUTANEOUS at 05:54

## 2023-12-03 RX ADMIN — CHLORHEXIDINE GLUCONATE 15 MILLILITER(S): 213 SOLUTION TOPICAL at 05:15

## 2023-12-03 RX ADMIN — Medication 3 MILLILITER(S): at 12:09

## 2023-12-03 RX ADMIN — SIMETHICONE 80 MILLIGRAM(S): 80 TABLET, CHEWABLE ORAL at 05:16

## 2023-12-03 RX ADMIN — SIMETHICONE 80 MILLIGRAM(S): 80 TABLET, CHEWABLE ORAL at 17:57

## 2023-12-03 RX ADMIN — Medication 5 MILLIGRAM(S): at 22:03

## 2023-12-03 RX ADMIN — PANTOPRAZOLE SODIUM 40 MILLIGRAM(S): 20 TABLET, DELAYED RELEASE ORAL at 05:21

## 2023-12-03 RX ADMIN — Medication 500 MILLIGRAM(S): at 17:57

## 2023-12-03 RX ADMIN — Medication 1 MILLIGRAM(S): at 22:03

## 2023-12-03 RX ADMIN — GABAPENTIN 100 MILLIGRAM(S): 400 CAPSULE ORAL at 22:03

## 2023-12-03 RX ADMIN — SIMETHICONE 80 MILLIGRAM(S): 80 TABLET, CHEWABLE ORAL at 23:31

## 2023-12-03 RX ADMIN — Medication 3 MILLILITER(S): at 17:14

## 2023-12-03 RX ADMIN — INSULIN HUMAN 13 UNIT(S): 100 INJECTION, SOLUTION SUBCUTANEOUS at 12:20

## 2023-12-03 RX ADMIN — AMLODIPINE BESYLATE 10 MILLIGRAM(S): 2.5 TABLET ORAL at 05:16

## 2023-12-03 RX ADMIN — Medication 500 MILLIGRAM(S): at 11:49

## 2023-12-03 RX ADMIN — LIDOCAINE 1 PATCH: 4 CREAM TOPICAL at 00:12

## 2023-12-03 RX ADMIN — INSULIN HUMAN 13 UNIT(S): 100 INJECTION, SOLUTION SUBCUTANEOUS at 17:58

## 2023-12-03 RX ADMIN — Medication 2 DROP(S): at 11:49

## 2023-12-03 RX ADMIN — LIDOCAINE 1 PATCH: 4 CREAM TOPICAL at 11:48

## 2023-12-03 RX ADMIN — Medication 500 MILLIGRAM(S): at 05:17

## 2023-12-03 RX ADMIN — Medication 200 MILLIGRAM(S): at 05:15

## 2023-12-03 RX ADMIN — QUETIAPINE FUMARATE 12.5 MILLIGRAM(S): 200 TABLET, FILM COATED ORAL at 18:00

## 2023-12-03 RX ADMIN — SODIUM CHLORIDE 4 MILLILITER(S): 9 INJECTION INTRAMUSCULAR; INTRAVENOUS; SUBCUTANEOUS at 05:43

## 2023-12-03 RX ADMIN — Medication 1 APPLICATION(S): at 23:31

## 2023-12-03 RX ADMIN — ZINC OXIDE 1 APPLICATION(S): 200 OINTMENT TOPICAL at 11:50

## 2023-12-03 RX ADMIN — Medication 3 MILLILITER(S): at 23:37

## 2023-12-03 RX ADMIN — Medication 2 DROP(S): at 00:23

## 2023-12-03 RX ADMIN — Medication 1 APPLICATION(S): at 00:23

## 2023-12-03 RX ADMIN — Medication 2 DROP(S): at 23:32

## 2023-12-03 RX ADMIN — SIMETHICONE 80 MILLIGRAM(S): 80 TABLET, CHEWABLE ORAL at 11:49

## 2023-12-03 RX ADMIN — Medication 125 MICROGRAM(S): at 04:55

## 2023-12-03 RX ADMIN — SIMETHICONE 80 MILLIGRAM(S): 80 TABLET, CHEWABLE ORAL at 00:22

## 2023-12-03 RX ADMIN — Medication 15 MILLILITER(S): at 11:49

## 2023-12-03 RX ADMIN — Medication 2 DROP(S): at 05:16

## 2023-12-03 RX ADMIN — Medication 1 APPLICATION(S): at 17:58

## 2023-12-03 RX ADMIN — Medication 2: at 05:55

## 2023-12-03 RX ADMIN — LIDOCAINE 1 PATCH: 4 CREAM TOPICAL at 19:52

## 2023-12-03 RX ADMIN — Medication 4: at 12:20

## 2023-12-03 RX ADMIN — Medication 2: at 23:32

## 2023-12-03 RX ADMIN — Medication 500 MILLIGRAM(S): at 00:22

## 2023-12-03 RX ADMIN — NYSTATIN CREAM 1 APPLICATION(S): 100000 CREAM TOPICAL at 05:16

## 2023-12-03 RX ADMIN — CHLORHEXIDINE GLUCONATE 1 APPLICATION(S): 213 SOLUTION TOPICAL at 05:21

## 2023-12-03 RX ADMIN — Medication 1 APPLICATION(S): at 05:15

## 2023-12-03 RX ADMIN — Medication 2 DROP(S): at 17:57

## 2023-12-03 RX ADMIN — SODIUM CHLORIDE 4 MILLILITER(S): 9 INJECTION INTRAMUSCULAR; INTRAVENOUS; SUBCUTANEOUS at 17:14

## 2023-12-03 RX ADMIN — NYSTATIN CREAM 1 APPLICATION(S): 100000 CREAM TOPICAL at 22:04

## 2023-12-03 RX ADMIN — Medication 5 MILLIGRAM(S): at 11:49

## 2023-12-03 RX ADMIN — Medication 1 TABLET(S): at 17:57

## 2023-12-03 RX ADMIN — Medication 3 MILLILITER(S): at 05:43

## 2023-12-03 RX ADMIN — CHLORHEXIDINE GLUCONATE 15 MILLILITER(S): 213 SOLUTION TOPICAL at 17:57

## 2023-12-03 RX ADMIN — Medication 1 TABLET(S): at 05:16

## 2023-12-03 RX ADMIN — LIDOCAINE 1 PATCH: 4 CREAM TOPICAL at 23:24

## 2023-12-03 NOTE — CHART NOTE - NSCHARTNOTEFT_GEN_A_CORE
Age: 73y    Gender: Female    POCT Blood Glucose:  161 mg/dL (12-03-23 @ 05:42)  94 mg/dL (12-02-23 @ 23:50)  102 mg/dL (12-02-23 @ 17:55)  201 mg/dL (12-02-23 @ 12:17)      eMAR:  insulin glargine Injectable (LANTUS)   20 Unit(s) SubCutaneous (12-03-23 @ 05:54)    insulin lispro (ADMELOG) corrective regimen sliding scale   2 Unit(s) SubCutaneous (12-03-23 @ 05:55)   4 Unit(s) SubCutaneous (12-02-23 @ 12:20)    insulin regular  human recombinant   19 Unit(s) SubCutaneous (12-03-23 @ 05:54)    insulin regular  human recombinant   15 Unit(s) SubCutaneous (12-02-23 @ 18:25)   15 Unit(s) SubCutaneous (12-02-23 @ 12:19)    levothyroxine   125 MICROGram(s) Oral (12-03-23 @ 04:55)    predniSONE   Tablet   5 milliGRAM(s) Oral (12-03-23 @ 05:21)    Diet, NPO with Tube Feed:   Tube Feeding Modality: Gastrostomy  [employer]ban [employer]apping glucose support 1.2  Total Volume for 24 Hours (mL): 1200  Continuous  Starting Tube Feed Rate {mL per Hour}: 60  Increase Tube Feed Rate by (mL): 0  Until Goal Tube Feed Rate (mL per Hour): 60  Tube Feed Duration (in Hours): 20  Tube Feed Start Time: 06:00  Tube Feed Stop Time: 02:00  Free Water Flush  Pump   Rate (mL per Hour): 10   Frequency: Every 2 Hours  Alfred(7 Gm Arginine/7 Gm Glut/1.2 Gm HMB     Qty per Day:  2 (11-29-23 @ 14:12) [Active]     POC glucose, insulin requirements, lab values reviewed.  noted Noon BG remains above goal with 1800 and MN BG tightly controlled, adjust regimen to BG Goal 100-180mg/dl    Type 2 diabetes mellitus with hyperglycemia, with long-term current use of insulin.   ·  Plan:   - on 20 hour TF [employer] [id number] 60mL /hr  -Test BG q6h while on TFs/NPO  -fbg at goal continue with lantus to 20 units daily in am  -Adjust to  Regular insulin 20 units at 0600,  Adjust to 13 units at 12 noon & 1800  - HOLD IF  TF ARE HELD. CAN GIVE LOWER DOSE IF CONCERN FOR HYPOGLYCEMIA.  -C/w moderate admelog correction scale every 6 hours Age: 73y    Gender: Female    POCT Blood Glucose:  161 mg/dL (12-03-23 @ 05:42)  94 mg/dL (12-02-23 @ 23:50)  102 mg/dL (12-02-23 @ 17:55)  201 mg/dL (12-02-23 @ 12:17)      eMAR:  insulin glargine Injectable (LANTUS)   20 Unit(s) SubCutaneous (12-03-23 @ 05:54)    insulin lispro (ADMELOG) corrective regimen sliding scale   2 Unit(s) SubCutaneous (12-03-23 @ 05:55)   4 Unit(s) SubCutaneous (12-02-23 @ 12:20)    insulin regular  human recombinant   19 Unit(s) SubCutaneous (12-03-23 @ 05:54)    insulin regular  human recombinant   15 Unit(s) SubCutaneous (12-02-23 @ 18:25)   15 Unit(s) SubCutaneous (12-02-23 @ 12:19)    levothyroxine   125 MICROGram(s) Oral (12-03-23 @ 04:55)    predniSONE   Tablet   5 milliGRAM(s) Oral (12-03-23 @ 05:21)    Diet, NPO with Tube Feed:   Tube Feeding Modality: Gastrostomy  Instantis glucose support 1.2  Total Volume for 24 Hours (mL): 1200  Continuous  Starting Tube Feed Rate {mL per Hour}: 60  Increase Tube Feed Rate by (mL): 0  Until Goal Tube Feed Rate (mL per Hour): 60  Tube Feed Duration (in Hours): 20  Tube Feed Start Time: 06:00  Tube Feed Stop Time: 02:00  Free Water Flush  Pump   Rate (mL per Hour): 10   Frequency: Every 2 Hours  Alfred(7 Gm Arginine/7 Gm Glut/1.2 Gm HMB     Qty per Day:  2 (11-29-23 @ 14:12) [Active]     POC glucose, insulin requirements, lab values reviewed.  noted Noon BG remains above goal with 1800 and MN BG tightly controlled, adjust regimen to BG Goal 100-180mg/dl    Type 2 diabetes mellitus with hyperglycemia, with long-term current use of insulin.   ·  Plan:   - on 20 hour TF [employer] [id number] 60mL /hr  -Test BG q6h while on TFs/NPO  -fbg at goal continue with lantus to 20 units daily in am  -Adjust to  Regular insulin 20 units at 0600,  Adjust to 13 units at 12 noon & 1800  - HOLD IF  TF ARE HELD. CAN GIVE LOWER DOSE IF CONCERN FOR HYPOGLYCEMIA.  -C/w moderate admelog correction scale every 6 hours Age: 73y    Gender: Female    POCT Blood Glucose:  161 mg/dL (12-03-23 @ 05:42)  94 mg/dL (12-02-23 @ 23:50)  102 mg/dL (12-02-23 @ 17:55)  201 mg/dL (12-02-23 @ 12:17)      eMAR:  insulin glargine Injectable (LANTUS)   20 Unit(s) SubCutaneous (12-03-23 @ 05:54)    insulin lispro (ADMELOG) corrective regimen sliding scale   2 Unit(s) SubCutaneous (12-03-23 @ 05:55)   4 Unit(s) SubCutaneous (12-02-23 @ 12:20)    insulin regular  human recombinant   19 Unit(s) SubCutaneous (12-03-23 @ 05:54)    insulin regular  human recombinant   15 Unit(s) SubCutaneous (12-02-23 @ 18:25)   15 Unit(s) SubCutaneous (12-02-23 @ 12:19)    levothyroxine   125 MICROGram(s) Oral (12-03-23 @ 04:55)    predniSONE   Tablet   5 milliGRAM(s) Oral (12-03-23 @ 05:21)    Diet, NPO with Tube Feed:   Tube Feeding Modality: Gastrostomy  Graceful Tables glucose support 1.2  Total Volume for 24 Hours (mL): 1200  Continuous  Starting Tube Feed Rate {mL per Hour}: 60  Increase Tube Feed Rate by (mL): 0  Until Goal Tube Feed Rate (mL per Hour): 60  Tube Feed Duration (in Hours): 20  Tube Feed Start Time: 06:00  Tube Feed Stop Time: 02:00  Free Water Flush  Pump   Rate (mL per Hour): 10   Frequency: Every 2 Hours  Alfred(7 Gm Arginine/7 Gm Glut/1.2 Gm HMB     Qty per Day:  2 (11-29-23 @ 14:12) [Active]     POC glucose, insulin requirements, lab values reviewed.  noted Noon BG remains above goal with 1800 and MN BG tightly controlled, adjust regimen to BG Goal 100-180mg/dl    Type 2 diabetes mellitus with hyperglycemia, with long-term current use of insulin.   ·  Plan:   - on 20 hour TF [employer] [id number] 60mL /hr  -Test BG q6h while on TFs/NPO  -fbg at goal continue with lantus to 20 units daily in am  -Adjust to  Regular insulin 20 units at 0600,  Adjust to 13 units at 12 noon & 1800  - HOLD IF  TF ARE HELD. CAN GIVE LOWER DOSE IF CONCERN FOR HYPOGLYCEMIA.  -C/w moderate admelog correction scale every 6 hours

## 2023-12-03 NOTE — PROGRESS NOTE ADULT - SUBJECTIVE AND OBJECTIVE BOX
CHIEF COMPLAINT: Patient is a 73y old  Female who presents with a chief complaint of Respiratory failure (01 Dec 2023 16:16)    Interval Events: None reported overnight. Clinically unchanged.     REVIEW OF SYSTEMS:  [ ] All other systems negative  [ ] Unable to assess ROS because ________    Mode: AC/ CMV (Assist Control/ Continuous Mandatory Ventilation), RR (machine): 12, TV (machine): 300, FiO2: 30, PEEP: 5, ITime: 1, MAP: 8, PIP: 24    OBJECTIVE:  ICU Vital Signs Last 24 Hrs  T(C): 36.8 (03 Dec 2023 04:24), Max: 37.3 (02 Dec 2023 22:07)  T(F): 98.2 (03 Dec 2023 04:24), Max: 99.1 (02 Dec 2023 22:07)  HR: 102 (03 Dec 2023 06:03) (79 - 102)  BP: 120/65 (03 Dec 2023 04:24) (114/61 - 121/59)  BP(mean): --  ABP: --  ABP(mean): --  RR: 17 (03 Dec 2023 04:24) (15 - 19)  SpO2: 99% (03 Dec 2023 06:03) (96% - 100%)    O2 Parameters below as of 03 Dec 2023 06:03  Patient On (Oxygen Delivery Method): ventilator    Mode: AC/ CMV (Assist Control/ Continuous Mandatory Ventilation), RR (machine): 12, TV (machine): 300, FiO2: 30, PEEP: 5, ITime: 1, MAP: 8, PIP: 24    12-02 @ 07:01  -  12-03 @ 07:00  --------------------------------------------------------  IN: 1830 mL / OUT: 1100 mL / NET: 730 mL    CAPILLARY BLOOD GLUCOSE    POCT Blood Glucose.: 161 mg/dL (03 Dec 2023 05:42)    HOSPITAL MEDICATIONS:  MEDICATIONS  (STANDING):  albuterol/ipratropium for Nebulization 3 milliLiter(s) Nebulizer every 6 hours  amLODIPine   Tablet 10 milliGRAM(s) Oral daily  artificial  tears Solution 2 Drop(s) Both EYES four times a day  ascorbic acid 500 milliGRAM(s) Oral daily  calcium carbonate    500 mG (Tums) Chewable 1 Tablet(s) Chew every 12 hours  cephalexin 500 milliGRAM(s) Oral every 6 hours  chlorhexidine 0.12% Liquid 15 milliLiter(s) Oral Mucosa every 12 hours  chlorhexidine 4% Liquid 1 Application(s) Topical <User Schedule>  ciprofloxacin   IVPB 400 milliGRAM(s) IV Intermittent every 12 hours  dextrose 50% Injectable 12.5 Gram(s) IV Push once  dextrose 50% Injectable 50 milliLiter(s) IV Push once  dextrose 50% Injectable 25 Gram(s) IV Push once  dextrose 50% Injectable 25 Gram(s) IV Push once  dextrose Oral Gel 15 Gram(s) Oral once  escitalopram 10 milliGRAM(s) Oral <User Schedule>  gabapentin Solution 100 milliGRAM(s) Enteral Tube at bedtime  glucagon  Injectable 1 milliGRAM(s) IntraMuscular once  hemorrhoidal Ointment      hemorrhoidal Ointment 1 Application(s) Rectal daily  insulin glargine Injectable (LANTUS) 20 Unit(s) SubCutaneous every morning  insulin lispro (ADMELOG) corrective regimen sliding scale   SubCutaneous every 6 hours  insulin regular  human recombinant 19 Unit(s) SubCutaneous <User Schedule>  insulin regular  human recombinant 15 Unit(s) SubCutaneous <User Schedule>  levothyroxine 125 MICROGram(s) Oral <User Schedule>  lidocaine   4% Patch 1 Patch Transdermal daily  melatonin 1 milliGRAM(s) Oral at bedtime  melatonin 5 milliGRAM(s) Oral at bedtime  multivitamin/minerals/iron Oral Solution (CENTRUM) 15 milliLiter(s) Oral daily  nystatin Powder 1 Application(s) Topical every 8 hours  oxybutynin 5 milliGRAM(s) Oral daily  pantoprazole   Suspension 40 milliGRAM(s) Enteral Tube <User Schedule>  petrolatum Ophthalmic Ointment 1 Application(s) Both EYES four times a day  predniSONE   Tablet 5 milliGRAM(s) Oral daily  QUEtiapine 12.5 milliGRAM(s) Oral <User Schedule>  simethicone 80 milliGRAM(s) Chew four times a day  sodium chloride 3%  Inhalation 4 milliLiter(s) Inhalation every 12 hours  zinc oxide 40% Paste 1 Application(s) Topical daily    MEDICATIONS  (PRN):  acetaminophen   Oral Liquid .. 1000 milliGRAM(s) Enteral Tube every 6 hours PRN Temp greater or equal to 38C (100.4F), Mild Pain (1 - 3)  benzocaine 20% Spray 1 Spray(s) Topical four times a day PRN Cough  diclofenac sodium 1% Gel 2 Gram(s) Topical four times a day PRN pain  diphenhydrAMINE Elixir 25 milliGRAM(s) Oral every 6 hours PRN Rash and/or Itching  guaifenesin/dextromethorphan Oral Liquid 10 milliLiter(s) Oral every 6 hours PRN Cough  oxyCODONE    Solution 2.5 milliGRAM(s) Oral every 6 hours PRN Moderate Pain (4 - 6)  sodium chloride 0.65% Nasal 1 Spray(s) Both Nostrils every 6 hours PRN Nasal Congestion    LABS:                        9.7    8.45  )-----------( 127      ( 03 Dec 2023 06:37 )             32.5     12-03    138  |  99  |  20  ----------------------------<  142<H>  3.9   |  27  |  <0.30<L>    Ca    9.5      03 Dec 2023 06:37  Phos  5.0     12-03  Mg     1.8     12-03    TPro  7.0  /  Alb  3.3  /  TBili  0.4  /  DBili  x   /  AST  22  /  ALT  22  /  AlkPhos  50  12-03    Urinalysis Basic - ( 03 Dec 2023 06:37 )    Color: x / Appearance: x / SG: x / pH: x  Gluc: 142 mg/dL / Ketone: x  / Bili: x / Urobili: x   Blood: x / Protein: x / Nitrite: x   Leuk Esterase: x / RBC: x / WBC x   Sq Epi: x / Non Sq Epi: x / Bacteria: x    PAST MEDICAL & SURGICAL HISTORY:  Diabetes    Rheumatoid arthritis    Fibromyalgia    Hypothyroid    Hypertension    H/O tracheostomy      PEG (percutaneous endoscopic gastrostomy) status          FAMILY HISTORY:      Social History:  lives at home with family  dependent for all ADL  no toxic habits  FULL CODE (28 Oct 2023 04:18)      RADIOLOGY:  [ ] Reviewed and interpreted by me    PULMONARY FUNCTION TESTS:    EKG: CHIEF COMPLAINT: Patient is a 73y old  Female who presents with a chief complaint of Respiratory failure (01 Dec 2023 16:16)    Interval Events: None reported overnight. Still reporting abd pain. Amylase/lipase wnl, and Abd US no sign of acute cholecystitis. Labs and vitals signs reviewed.    REVIEW OF SYSTEMS:  See above  [x] All other systems negative    Mode: AC/ CMV (Assist Control/ Continuous Mandatory Ventilation), RR (machine): 12, TV (machine): 300, FiO2: 30, PEEP: 5, ITime: 1, MAP: 8, PIP: 24    OBJECTIVE:  ICU Vital Signs Last 24 Hrs  T(C): 36.8 (03 Dec 2023 04:24), Max: 37.3 (02 Dec 2023 22:07)  T(F): 98.2 (03 Dec 2023 04:24), Max: 99.1 (02 Dec 2023 22:07)  HR: 102 (03 Dec 2023 06:03) (79 - 102)  BP: 120/65 (03 Dec 2023 04:24) (114/61 - 121/59)  BP(mean): --  ABP: --  ABP(mean): --  RR: 17 (03 Dec 2023 04:24) (15 - 19)  SpO2: 99% (03 Dec 2023 06:03) (96% - 100%)    O2 Parameters below as of 03 Dec 2023 06:03  Patient On (Oxygen Delivery Method): ventilator    Mode: AC/ CMV (Assist Control/ Continuous Mandatory Ventilation), RR (machine): 12, TV (machine): 300, FiO2: 30, PEEP: 5, ITime: 1, MAP: 8, PIP: 24    12-02 @ 07:01  -  12-03 @ 07:00  --------------------------------------------------------  IN: 1830 mL / OUT: 1100 mL / NET: 730 mL    CAPILLARY BLOOD GLUCOSE    POCT Blood Glucose.: 161 mg/dL (03 Dec 2023 05:42)    HOSPITAL MEDICATIONS:  MEDICATIONS  (STANDING):  albuterol/ipratropium for Nebulization 3 milliLiter(s) Nebulizer every 6 hours  amLODIPine   Tablet 10 milliGRAM(s) Oral daily  artificial  tears Solution 2 Drop(s) Both EYES four times a day  ascorbic acid 500 milliGRAM(s) Oral daily  calcium carbonate    500 mG (Tums) Chewable 1 Tablet(s) Chew every 12 hours  cephalexin 500 milliGRAM(s) Oral every 6 hours  chlorhexidine 0.12% Liquid 15 milliLiter(s) Oral Mucosa every 12 hours  chlorhexidine 4% Liquid 1 Application(s) Topical <User Schedule>  ciprofloxacin   IVPB 400 milliGRAM(s) IV Intermittent every 12 hours  dextrose 50% Injectable 12.5 Gram(s) IV Push once  dextrose 50% Injectable 50 milliLiter(s) IV Push once  dextrose 50% Injectable 25 Gram(s) IV Push once  dextrose 50% Injectable 25 Gram(s) IV Push once  dextrose Oral Gel 15 Gram(s) Oral once  escitalopram 10 milliGRAM(s) Oral <User Schedule>  gabapentin Solution 100 milliGRAM(s) Enteral Tube at bedtime  glucagon  Injectable 1 milliGRAM(s) IntraMuscular once  hemorrhoidal Ointment      hemorrhoidal Ointment 1 Application(s) Rectal daily  insulin glargine Injectable (LANTUS) 20 Unit(s) SubCutaneous every morning  insulin lispro (ADMELOG) corrective regimen sliding scale   SubCutaneous every 6 hours  insulin regular  human recombinant 19 Unit(s) SubCutaneous <User Schedule>  insulin regular  human recombinant 15 Unit(s) SubCutaneous <User Schedule>  levothyroxine 125 MICROGram(s) Oral <User Schedule>  lidocaine   4% Patch 1 Patch Transdermal daily  melatonin 1 milliGRAM(s) Oral at bedtime  melatonin 5 milliGRAM(s) Oral at bedtime  multivitamin/minerals/iron Oral Solution (CENTRUM) 15 milliLiter(s) Oral daily  nystatin Powder 1 Application(s) Topical every 8 hours  oxybutynin 5 milliGRAM(s) Oral daily  pantoprazole   Suspension 40 milliGRAM(s) Enteral Tube <User Schedule>  petrolatum Ophthalmic Ointment 1 Application(s) Both EYES four times a day  predniSONE   Tablet 5 milliGRAM(s) Oral daily  QUEtiapine 12.5 milliGRAM(s) Oral <User Schedule>  simethicone 80 milliGRAM(s) Chew four times a day  sodium chloride 3%  Inhalation 4 milliLiter(s) Inhalation every 12 hours  zinc oxide 40% Paste 1 Application(s) Topical daily    MEDICATIONS  (PRN):  acetaminophen   Oral Liquid .. 1000 milliGRAM(s) Enteral Tube every 6 hours PRN Temp greater or equal to 38C (100.4F), Mild Pain (1 - 3)  benzocaine 20% Spray 1 Spray(s) Topical four times a day PRN Cough  diclofenac sodium 1% Gel 2 Gram(s) Topical four times a day PRN pain  diphenhydrAMINE Elixir 25 milliGRAM(s) Oral every 6 hours PRN Rash and/or Itching  guaifenesin/dextromethorphan Oral Liquid 10 milliLiter(s) Oral every 6 hours PRN Cough  oxyCODONE    Solution 2.5 milliGRAM(s) Oral every 6 hours PRN Moderate Pain (4 - 6)  sodium chloride 0.65% Nasal 1 Spray(s) Both Nostrils every 6 hours PRN Nasal Congestion    PHYSICAL EXAM  GENERAL: Laying in bed comfortably, NAD  HEENT: +Trach, area c/d/i  HEART: +s1, s2  PULM: +Coarse breath sounds noted  MSK: no asymmetry  Skin: +pressure sacral dedub stage IV, rest of skin assessment as per RN sheet   Neuro: alert and awake, poor phonation, able to mouth words on vent, following simple commands     LABS:                        9.7    8.45  )-----------( 127      ( 03 Dec 2023 06:37 )             32.5     12-03    138  |  99  |  20  ----------------------------<  142<H>  3.9   |  27  |  <0.30<L>    Ca    9.5      03 Dec 2023 06:37  Phos  5.0     12-03  Mg     1.8     12-03    TPro  7.0  /  Alb  3.3  /  TBili  0.4  /  DBili  x   /  AST  22  /  ALT  22  /  AlkPhos  50  12-03    Urinalysis Basic - ( 03 Dec 2023 06:37 )    Color: x / Appearance: x / SG: x / pH: x  Gluc: 142 mg/dL / Ketone: x  / Bili: x / Urobili: x   Blood: x / Protein: x / Nitrite: x   Leuk Esterase: x / RBC: x / WBC x   Sq Epi: x / Non Sq Epi: x / Bacteria: x    PAST MEDICAL & SURGICAL HISTORY:  Diabetes    Rheumatoid arthritis    Fibromyalgia    Hypothyroid    Hypertension    H/O tracheostomy    PEG (percutaneous endoscopic gastrostomy) status    FAMILY HISTORY:    Social History:  lives at home with family  dependent for all ADL  no toxic habits  FULL CODE (28 Oct 2023 04:18)      RADIOLOGY:  [ ] Reviewed and interpreted by me    PULMONARY FUNCTION TESTS:    EKG:

## 2023-12-03 NOTE — PROGRESS NOTE ADULT - PROBLEM SELECTOR PLAN 2
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   - S/p CT abd/pelvis (10/28): Sacral decubitus ulcer extending to the coccyx with osseous remodeling and pelvic MRI or bone scan to recc to exclude osteomyelitis.  - 10/30 wound care note: Sacral stage 4 pressure injury 4.5cm x 2.5cm x 1.5cm w/ lip of undermining circumferentially greatest 9-12 of 3cm.  - MRI pelvis 10/30 with Osteomyelitis, c/w current Cefepime for 7 days, Vancomycin d/marli   - Elevated, ESR 85   - Elevated,    - Wound care on board  - 11/10: resumed Cipro 500mg q 12 and Keflex 500mg q 6 until 12/10.  - 11/20: Changed to IV Cipro 400 q12 as recommended by ID pharmacist due to multiple drug interactions when given via PEG  - 11/28 wound care note: Sacral stage 4pressure injury 4.5cm x 2.5cm x 0.5cm, moist granular wound bed   palpable but not exposed bone no blistering, (+) serosanguinous drainage. No odor, erythema, increased warmth, tenderness, induration, fluctuance, nor crepitus. 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   - S/p CT abd/pelvis (10/28): Sacral decubitus ulcer extending to the coccyx with osseous remodeling and pelvic MRI or bone scan to recc to exclude osteomyelitis.  - 10/30 wound care note: Sacral stage 4 pressure injury 4.5cm x 2.5cm x 1.5cm w/ lip of undermining circumferentially greatest 9-12 of 3cm.  - MRI pelvis 10/30 with Osteomyelitis, c/w current Cefepime for 7 days, Vancomycin d/marli   - Elevated, ESR 85   - Elevated,    - Wound care on board  - 11/10: resumed Cipro 500mg q 12 and Keflex 500mg q 6 until 12/10.  - 11/20: Changed to IV Cipro 400 q12 as recommended by ID pharmacist due to multiple drug interactions when given via PEG  - 11/28 wound care note: Sacral stage 4pressure injury 4.5cm x 2.5cm x 0.5cm, moist granular wound bed   palpable but not exposed bone no blistering, (+) serosanguinous drainage. No odor, erythema, increased warmth, tenderness, induration, fluctuance, nor crepitus.  - (12/3) Wound cx collected on 11/29, has been cancelled, unsure why. Plan to follow up with Wound care team to recollect new specimen

## 2023-12-03 NOTE — PROGRESS NOTE ADULT - PROBLEM SELECTOR PLAN 5
- Home amlodipine held on admission, restarted -- continue monitoring BP   - Echo from 10/17/2020 notable for hyperdynamic L ventricular systolic function, moderately severe LVOT obstruction, and EF 81% (per Wilkinson calculation) vs 77% (per Teichholz calculation) - Home amlodipine held on admission, restarted -- continue monitoring BP   - Echo from 10/17/2020 notable for hyperdynamic L ventricular systolic function, moderately severe LVOT obstruction, and EF 81% (per Wilkinson calculation) vs 77% (per Teichholz calculation)    - f/u w Thyroid panel

## 2023-12-03 NOTE — PROGRESS NOTE ADULT - ASSESSMENT
72 y/o F with PMHx of T2DM, HTN, HLD, anemia, hypothyroidism, RA, fibromyalgia, remote cerebral aneurysm repair, acute hypercapnic respiratory failure now with tracheostomy, PEG tube, and bedbound (trach change 8/18, PEG change 8/14), sacral pressure ulcer, BIBEMS from home for 6 day hx of bleeding from the tracheostomy and PEG tube with associated abdominal pain, recent history of auditory and visual hallucinations, and with chronic sacral decubitus ulcer. Exam notable for mild abdominal distention with LLQ and RLQ tenderness, tracheostomy in place with no obvious bleeding, PEG tube with scant amount of dried blood, at baseline neurologic status. Imaging showing no active bleeding around tracheostomy site, however was notable for mild thickening along PEG tube tract, sacral ulcer extending to the coccyx suggestive of possible osteomyelitis, and bibasilar patchy lung opacities with volume loss. Patient admitted for respiratory support, wound care of tracheostomy and PEG, and management of sacral osteomyelitis. No further Trach and peg site  bleeding noted since admission.  Pelvic MRI imaging also suggestive of Acute OM. Patient placed on long-term antibiotics with Infectious disease guidance.  Additionally treated with a 7d course of Cefepime due to PSA and Serratia tracheitis on 11/8-->11/15 and then resumed cipro/keflex.  Hospital course c/b Delirium for which Seroquel was added and home Lexapro continued. Tracheostomy changed to #9 Cuffed [place] by ENT 11/3.  Despite trach change patient remained with persistent air leak.  On 11/19, the trach was again changed due to leak and loss of volumes on vent.  Trach was changed to #8 distal cuffed Shiley.  Patient seen by Dermatology for back lesions.  One diagnosed as a seborrheic keratitis and the other a dermatofibroma.       12/2: s/p abdominal ultrasound - limited study, no definite sonographic evidence of acute cholecystitis.  Daughter concerned pt is more sleepy - on exam, pt appears at her baseline, intermittently resting but easily arousable and follows commands.   74 y/o F with PMHx of T2DM, HTN, HLD, anemia, hypothyroidism, RA, fibromyalgia, remote cerebral aneurysm repair, acute hypercapnic respiratory failure now with tracheostomy, PEG tube, and bedbound (trach change 8/18, PEG change 8/14), sacral pressure ulcer, BIBEMS from home for 6 day hx of bleeding from the tracheostomy and PEG tube with associated abdominal pain, recent history of auditory and visual hallucinations, and with chronic sacral decubitus ulcer. Exam notable for mild abdominal distention with LLQ and RLQ tenderness, tracheostomy in place with no obvious bleeding, PEG tube with scant amount of dried blood, at baseline neurologic status. Imaging showing no active bleeding around tracheostomy site, however was notable for mild thickening along PEG tube tract, sacral ulcer extending to the coccyx suggestive of possible osteomyelitis, and bibasilar patchy lung opacities with volume loss. Patient admitted for respiratory support, wound care of tracheostomy and PEG, and management of sacral osteomyelitis. No further Trach and peg site  bleeding noted since admission.  Pelvic MRI imaging also suggestive of Acute OM. Patient placed on long-term antibiotics with Infectious disease guidance.  Additionally treated with a 7d course of Cefepime due to PSA and Serratia tracheitis on 11/8-->11/15 and then resumed cipro/keflex.  Hospital course c/b Delirium for which Seroquel was added and home Lexapro continued. Tracheostomy changed to #9 Cuffed [place] by ENT 11/3.  Despite trach change patient remained with persistent air leak.  On 11/19, the trach was again changed due to leak and loss of volumes on vent.  Trach was changed to #8 distal cuffed Shiley.  Patient seen by Dermatology for back lesions.  One diagnosed as a seborrheic keratitis and the other a dermatofibroma.       12/2: s/p abdominal ultrasound - limited study, no definite sonographic evidence of acute cholecystitis.  Daughter concerned pt is more sleepy - on exam, pt appears at her baseline, intermittently resting but easily arousable and follows commands.  12/3: No new nursing issues. Pt is still reporting abd pain, and daughter is concerned and would like a scan. no fever, and leukocytosis/  wound cx collected by wound care team on 11/29 has been cancelled, will follow up wound care to recollect specimen. CT A&P w IV contrast pending

## 2023-12-03 NOTE — PROGRESS NOTE ADULT - PROBLEM SELECTOR PLAN 4
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   - Bowel regimen  - PEG Site appears macerated. Multifactorial, possible the PEG has been too tight at home.  - Peg loosened by GI at beside, no leakage or further intervention required   - recurrent RLQ abdominal pain and bleeding at the PEG site  - reevaluated by GI -- no bleeding at the PEG site  - no BM since 11/19 -- increased dose of Senna and increased Miralax to BID   - 11/28 frequent multiple BMs, senna and Miralax d/c'd - GI evaluated  - Continue Simethicone.  - 12/1: s/p abdominal ultrasound - limited study - CT A/P if signs of worsening abdominal pain 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   - Bowel regimen  - PEG Site appears macerated. Multifactorial, possible the PEG has been too tight at home.  - Peg loosened by GI at beside, no leakage or further intervention required   - recurrent RLQ abdominal pain and bleeding at the PEG site  - reevaluated by GI -- no bleeding at the PEG site  - no BM since 11/19 -- increased dose of Senna and increased Miralax to BID   - 11/28 frequent multiple BMs, senna and Miralax d/c'd - GI evaluated  - Continue Simethicone.  - 12/1: s/p abdominal ultrasound - limited study - CT A/P if signs of worsening abdominal pain  - (12/3): Pt is c/o abd pain, and daughter is concerned - plan to obtain CT A&P w IV contrast  - AM amylase and Lipase all wnl

## 2023-12-03 NOTE — PROGRESS NOTE ADULT - NS ATTEND AMEND GEN_ALL_CORE FT
73F with a h/o DM, HTN, HLD, anemia, hypothyroidism, RA, fibromyalgia, remote cerebral aneurysm with repair, hypercapnic respiratory failure s/p tracheostomy/PEG, bedbound, sacral pressure ulcer. Admitted w/ bleeding from tracheostomy and PEG w/ associated abdominal pain, recent hx of auditory/visual hallucinations. Since admission, no further bleeding noted from either trach or PEG. Imaging showed mild thickening along PEG tube tract and sacral ulcer extending to coccyx suggestive of OM.     # Osteomyelitis  # Chronic hypoxemic RF  # oropharyngeal dysphagia  # acute on chronic pain  # Trach/Peg bleeding  # Tracheitis with Pseudomonas and serratia  # Hx of hallucination, anxiety - particularly in setting of prior antibiotics    #Neuro - awake, alert, and interactive. moving all extremities  - Patient presently calm and following commands appropriately. Continue current medications  - Behavioral Health Follow-up as needed   - Sleep remains poor - increased dose of Melatonin (6mg) last night  #Cardiovascular - hemodynamically stable  #Pulmonary - acute on chronic hypoxemic respiratory failure - on home vent after extensive hospitalizations over the prior year since being trached last December - PSV as able though still requiring high support  - Patient phonating while on vent this AM - receiving full volumes on vent  - ENT follow-up as needed - previously changed to 8XLT trach - daughter was upset about previous trach changes but presently patient ventilating in stable fashion on current settings  - Maintain O2 sat > 90%  - Daughter requesting home CO2 monitoring device as unable have ABGs done at home as patient has a history of CO2 increase off ventilator  - Continue hypersal for now  #Gastro - oropharyngeal dysphagia with PEG tube in place - ensure appropriate feeds with Nutrition  - No further diarrhea/loose BM today since stool softeners stopped  - GI evaluation and AXR noted from earlier this week - no signs of obstruction  - More abdominal pain today which is not resolving with gas - therefore will check abd US per d/w GI. Repeat LFTs today.  - History of c. diff previously treated per daughter - if diarrhea recurs off stool softeners will need to repeat testing for this.   #Infectious Disease - sacral osteo based on MRI - wound care follow-up (per daughter had debridement in past at another hospitalization)  - On review of wound care notes there does appear to be some improvement in at least one dimension of wound from admission measurements. The patient has had this wound since a hospitalization in December and per daughter does not have chronic or multiple wounds but only this single wound. Unclear if wound culture is needed will f/u with wound team.  - Continue Cipro and Keflex ass per ID - with plan for 6 week course in total. Will continue Cipro in D5 despite hyperglycemia given improved absorption compared to PO per d/w ID and pharmacy  - Per daughter, patient has had prior multiple infections during hospitalizations including prior pseudomonas, MRSA, E faecalis, and Klebsiella  - Sputum now with MDR Pseudomonas - ID follow-up however would consider holding off targeting this as respiratory status presently stable and no increase in secretions  - Follow-up repeat blood cultures thus far negative. Urine culture (VRE) not likely a true infection per d/w ID  - Daughter reports a recent dental abscess and being told by outpatient dentist that patient needed teeth extracted - Dental evaluation noted with no plans for intervention as inpatient.   - Leukocytosis waxing/waning last few days but no fevers and clinically patient looks improved. Will continue to monitor  #Hem/Onc - prior cytopenias in setting of antibiotics per patient's daughter   - Required PRBC transfusion over the weekend for Hb < 8 but no clear signs of bleeding  - Hem/Onc follow-up noted - suspect an anemia of chronic disease  - Daughter told by a prior Hematologist that her Hb must remain above 8 - though unclear whether this was in relation to any particular finding or just clinical gestalt  #Pain - continue current pain control - in setting of fibromyalgia and pain from wound  - restarted Oxy after discussion with patient's daughter yesterday - improvement noted  #Derm - previously seen by derm for skin lesions - mid back with irritated seborrheic keratosis - outpatient follow-up  #Endo - DM2 - continue insulin and adjust as needed for goal FS < 180  - Endocrine follow-up for assistance with hyperglycemia management  - Continue Synthroid - TSH WNL  #DVT proph - SCD  - Prior prophylactic AC stopped after concern for prior bleeding    Prognosis guarded    I spoke with patient's   this AM during rounds for about 15 minutes. All questions answered.- IV antibiotics are going to be needed til about 12/10 per ID.   -

## 2023-12-03 NOTE — PROGRESS NOTE ADULT - PROBLEM SELECTOR PLAN 7
- s/p Home Levemir 14 units in morning held on admission as noted w/ hypoglycemia   - Enteral feed changed to [employer] diabetic formula; glucose numbers stabilizing. - s/p Home Levemir 14 units in morning held on admission as noted w/ hypoglycemia   - Enteral feed changed to [employer] diabetic formula; glucose numbers stabilizing. - s/p Home Levemir 14 units in morning held on admission as noted w/ hypoglycemia   - Enteral feed changed to [place] diabetic formula; glucose numbers stabilizing.

## 2023-12-04 DIAGNOSIS — Z71.89 OTHER SPECIFIED COUNSELING: ICD-10-CM

## 2023-12-04 DIAGNOSIS — R53.2 FUNCTIONAL QUADRIPLEGIA: ICD-10-CM

## 2023-12-04 LAB
ANION GAP SERPL CALC-SCNC: 11 MMOL/L — SIGNIFICANT CHANGE UP (ref 5–17)
ANION GAP SERPL CALC-SCNC: 11 MMOL/L — SIGNIFICANT CHANGE UP (ref 5–17)
BUN SERPL-MCNC: 17 MG/DL — SIGNIFICANT CHANGE UP (ref 7–23)
BUN SERPL-MCNC: 17 MG/DL — SIGNIFICANT CHANGE UP (ref 7–23)
CALCIUM SERPL-MCNC: 9.4 MG/DL — SIGNIFICANT CHANGE UP (ref 8.4–10.5)
CALCIUM SERPL-MCNC: 9.4 MG/DL — SIGNIFICANT CHANGE UP (ref 8.4–10.5)
CHLORIDE SERPL-SCNC: 101 MMOL/L — SIGNIFICANT CHANGE UP (ref 96–108)
CHLORIDE SERPL-SCNC: 101 MMOL/L — SIGNIFICANT CHANGE UP (ref 96–108)
CO2 SERPL-SCNC: 25 MMOL/L — SIGNIFICANT CHANGE UP (ref 22–31)
CO2 SERPL-SCNC: 25 MMOL/L — SIGNIFICANT CHANGE UP (ref 22–31)
CREAT SERPL-MCNC: <0.3 MG/DL — LOW (ref 0.5–1.3)
CREAT SERPL-MCNC: <0.3 MG/DL — LOW (ref 0.5–1.3)
EGFR: 113 ML/MIN/1.73M2 — SIGNIFICANT CHANGE UP
EGFR: 113 ML/MIN/1.73M2 — SIGNIFICANT CHANGE UP
GLUCOSE BLDC GLUCOMTR-MCNC: 100 MG/DL — HIGH (ref 70–99)
GLUCOSE BLDC GLUCOMTR-MCNC: 100 MG/DL — HIGH (ref 70–99)
GLUCOSE BLDC GLUCOMTR-MCNC: 123 MG/DL — HIGH (ref 70–99)
GLUCOSE BLDC GLUCOMTR-MCNC: 123 MG/DL — HIGH (ref 70–99)
GLUCOSE BLDC GLUCOMTR-MCNC: 143 MG/DL — HIGH (ref 70–99)
GLUCOSE BLDC GLUCOMTR-MCNC: 143 MG/DL — HIGH (ref 70–99)
GLUCOSE BLDC GLUCOMTR-MCNC: 191 MG/DL — HIGH (ref 70–99)
GLUCOSE BLDC GLUCOMTR-MCNC: 191 MG/DL — HIGH (ref 70–99)
GLUCOSE SERPL-MCNC: 200 MG/DL — HIGH (ref 70–99)
GLUCOSE SERPL-MCNC: 200 MG/DL — HIGH (ref 70–99)
HCT VFR BLD CALC: 30 % — LOW (ref 34.5–45)
HCT VFR BLD CALC: 30 % — LOW (ref 34.5–45)
HGB BLD-MCNC: 8.8 G/DL — LOW (ref 11.5–15.5)
HGB BLD-MCNC: 8.8 G/DL — LOW (ref 11.5–15.5)
MAGNESIUM SERPL-MCNC: 1.7 MG/DL — SIGNIFICANT CHANGE UP (ref 1.6–2.6)
MAGNESIUM SERPL-MCNC: 1.7 MG/DL — SIGNIFICANT CHANGE UP (ref 1.6–2.6)
MCHC RBC-ENTMCNC: 27.6 PG — SIGNIFICANT CHANGE UP (ref 27–34)
MCHC RBC-ENTMCNC: 27.6 PG — SIGNIFICANT CHANGE UP (ref 27–34)
MCHC RBC-ENTMCNC: 29.3 GM/DL — LOW (ref 32–36)
MCHC RBC-ENTMCNC: 29.3 GM/DL — LOW (ref 32–36)
MCV RBC AUTO: 94 FL — SIGNIFICANT CHANGE UP (ref 80–100)
MCV RBC AUTO: 94 FL — SIGNIFICANT CHANGE UP (ref 80–100)
NRBC # BLD: 0 /100 WBCS — SIGNIFICANT CHANGE UP (ref 0–0)
NRBC # BLD: 0 /100 WBCS — SIGNIFICANT CHANGE UP (ref 0–0)
PHOSPHATE SERPL-MCNC: 4.6 MG/DL — HIGH (ref 2.5–4.5)
PHOSPHATE SERPL-MCNC: 4.6 MG/DL — HIGH (ref 2.5–4.5)
PLATELET # BLD AUTO: 102 K/UL — LOW (ref 150–400)
PLATELET # BLD AUTO: 102 K/UL — LOW (ref 150–400)
POTASSIUM SERPL-MCNC: 3.4 MMOL/L — LOW (ref 3.5–5.3)
POTASSIUM SERPL-MCNC: 3.4 MMOL/L — LOW (ref 3.5–5.3)
POTASSIUM SERPL-SCNC: 3.4 MMOL/L — LOW (ref 3.5–5.3)
POTASSIUM SERPL-SCNC: 3.4 MMOL/L — LOW (ref 3.5–5.3)
RBC # BLD: 3.19 M/UL — LOW (ref 3.8–5.2)
RBC # BLD: 3.19 M/UL — LOW (ref 3.8–5.2)
RBC # FLD: 15.9 % — HIGH (ref 10.3–14.5)
RBC # FLD: 15.9 % — HIGH (ref 10.3–14.5)
SODIUM SERPL-SCNC: 137 MMOL/L — SIGNIFICANT CHANGE UP (ref 135–145)
SODIUM SERPL-SCNC: 137 MMOL/L — SIGNIFICANT CHANGE UP (ref 135–145)
T3 SERPL-MCNC: 60 NG/DL — LOW (ref 80–200)
T3 SERPL-MCNC: 60 NG/DL — LOW (ref 80–200)
T4 AB SER-ACNC: 7.6 UG/DL — SIGNIFICANT CHANGE UP (ref 4.6–12)
T4 AB SER-ACNC: 7.6 UG/DL — SIGNIFICANT CHANGE UP (ref 4.6–12)
TSH SERPL-MCNC: 1.57 UIU/ML — SIGNIFICANT CHANGE UP (ref 0.27–4.2)
TSH SERPL-MCNC: 1.57 UIU/ML — SIGNIFICANT CHANGE UP (ref 0.27–4.2)
WBC # BLD: 5.92 K/UL — SIGNIFICANT CHANGE UP (ref 3.8–10.5)
WBC # BLD: 5.92 K/UL — SIGNIFICANT CHANGE UP (ref 3.8–10.5)
WBC # FLD AUTO: 5.92 K/UL — SIGNIFICANT CHANGE UP (ref 3.8–10.5)
WBC # FLD AUTO: 5.92 K/UL — SIGNIFICANT CHANGE UP (ref 3.8–10.5)

## 2023-12-04 PROCEDURE — 74177 CT ABD & PELVIS W/CONTRAST: CPT | Mod: 26

## 2023-12-04 PROCEDURE — 99233 SBSQ HOSP IP/OBS HIGH 50: CPT

## 2023-12-04 PROCEDURE — 99232 SBSQ HOSP IP/OBS MODERATE 35: CPT

## 2023-12-04 PROCEDURE — 99223 1ST HOSP IP/OBS HIGH 75: CPT

## 2023-12-04 RX ORDER — PANTOPRAZOLE SODIUM 20 MG/1
40 TABLET, DELAYED RELEASE ORAL
Refills: 0 | Status: DISCONTINUED | OUTPATIENT
Start: 2023-12-04 | End: 2024-01-17

## 2023-12-04 RX ORDER — POTASSIUM CHLORIDE 20 MEQ
40 PACKET (EA) ORAL ONCE
Refills: 0 | Status: COMPLETED | OUTPATIENT
Start: 2023-12-04 | End: 2023-12-04

## 2023-12-04 RX ORDER — INSULIN HUMAN 100 [IU]/ML
13 INJECTION, SOLUTION SUBCUTANEOUS
Refills: 0 | Status: DISCONTINUED | OUTPATIENT
Start: 2023-12-04 | End: 2023-12-04

## 2023-12-04 RX ORDER — MAGNESIUM SULFATE 500 MG/ML
2 VIAL (ML) INJECTION ONCE
Refills: 0 | Status: COMPLETED | OUTPATIENT
Start: 2023-12-04 | End: 2023-12-04

## 2023-12-04 RX ORDER — INSULIN HUMAN 100 [IU]/ML
11 INJECTION, SOLUTION SUBCUTANEOUS
Refills: 0 | Status: DISCONTINUED | OUTPATIENT
Start: 2023-12-04 | End: 2023-12-12

## 2023-12-04 RX ORDER — INSULIN HUMAN 100 [IU]/ML
22 INJECTION, SOLUTION SUBCUTANEOUS
Refills: 0 | Status: DISCONTINUED | OUTPATIENT
Start: 2023-12-05 | End: 2023-12-09

## 2023-12-04 RX ADMIN — Medication 40 MILLIEQUIVALENT(S): at 12:00

## 2023-12-04 RX ADMIN — ESCITALOPRAM OXALATE 10 MILLIGRAM(S): 10 TABLET, FILM COATED ORAL at 08:43

## 2023-12-04 RX ADMIN — Medication 2 DROP(S): at 18:00

## 2023-12-04 RX ADMIN — Medication 200 MILLIGRAM(S): at 18:01

## 2023-12-04 RX ADMIN — Medication 500 MILLIGRAM(S): at 12:01

## 2023-12-04 RX ADMIN — Medication 1 APPLICATION(S): at 05:28

## 2023-12-04 RX ADMIN — Medication 2: at 12:04

## 2023-12-04 RX ADMIN — Medication 1 TABLET(S): at 05:28

## 2023-12-04 RX ADMIN — AMLODIPINE BESYLATE 10 MILLIGRAM(S): 2.5 TABLET ORAL at 05:28

## 2023-12-04 RX ADMIN — Medication 1 APPLICATION(S): at 23:27

## 2023-12-04 RX ADMIN — Medication 1 MILLIGRAM(S): at 21:48

## 2023-12-04 RX ADMIN — LIDOCAINE 1 PATCH: 4 CREAM TOPICAL at 19:21

## 2023-12-04 RX ADMIN — Medication 500 MILLIGRAM(S): at 17:58

## 2023-12-04 RX ADMIN — CHLORHEXIDINE GLUCONATE 15 MILLILITER(S): 213 SOLUTION TOPICAL at 05:29

## 2023-12-04 RX ADMIN — NYSTATIN CREAM 1 APPLICATION(S): 100000 CREAM TOPICAL at 05:28

## 2023-12-04 RX ADMIN — Medication 3 MILLILITER(S): at 11:50

## 2023-12-04 RX ADMIN — INSULIN HUMAN 11 UNIT(S): 100 INJECTION, SOLUTION SUBCUTANEOUS at 17:59

## 2023-12-04 RX ADMIN — PANTOPRAZOLE SODIUM 40 MILLIGRAM(S): 20 TABLET, DELAYED RELEASE ORAL at 05:44

## 2023-12-04 RX ADMIN — QUETIAPINE FUMARATE 12.5 MILLIGRAM(S): 200 TABLET, FILM COATED ORAL at 18:01

## 2023-12-04 RX ADMIN — Medication 2 DROP(S): at 23:27

## 2023-12-04 RX ADMIN — Medication 1 TABLET(S): at 18:00

## 2023-12-04 RX ADMIN — PHENYLEPHRINE-SHARK LIVER OIL-MINERAL OIL-PETROLATUM RECTAL OINTMENT 1 APPLICATION(S): at 12:03

## 2023-12-04 RX ADMIN — GABAPENTIN 100 MILLIGRAM(S): 400 CAPSULE ORAL at 21:49

## 2023-12-04 RX ADMIN — SIMETHICONE 80 MILLIGRAM(S): 80 TABLET, CHEWABLE ORAL at 12:01

## 2023-12-04 RX ADMIN — Medication 25 GRAM(S): at 12:00

## 2023-12-04 RX ADMIN — Medication 500 MILLIGRAM(S): at 23:27

## 2023-12-04 RX ADMIN — INSULIN GLARGINE 20 UNIT(S): 100 INJECTION, SOLUTION SUBCUTANEOUS at 05:43

## 2023-12-04 RX ADMIN — Medication 125 MICROGRAM(S): at 05:27

## 2023-12-04 RX ADMIN — Medication 5 MILLIGRAM(S): at 05:28

## 2023-12-04 RX ADMIN — NYSTATIN CREAM 1 APPLICATION(S): 100000 CREAM TOPICAL at 21:49

## 2023-12-04 RX ADMIN — LIDOCAINE 1 PATCH: 4 CREAM TOPICAL at 12:01

## 2023-12-04 RX ADMIN — Medication 3 MILLILITER(S): at 23:12

## 2023-12-04 RX ADMIN — SODIUM CHLORIDE 4 MILLILITER(S): 9 INJECTION INTRAMUSCULAR; INTRAVENOUS; SUBCUTANEOUS at 05:05

## 2023-12-04 RX ADMIN — SIMETHICONE 80 MILLIGRAM(S): 80 TABLET, CHEWABLE ORAL at 23:26

## 2023-12-04 RX ADMIN — Medication 500 MILLIGRAM(S): at 05:28

## 2023-12-04 RX ADMIN — Medication 2 DROP(S): at 12:02

## 2023-12-04 RX ADMIN — Medication 1 APPLICATION(S): at 18:00

## 2023-12-04 RX ADMIN — Medication 3 MILLILITER(S): at 05:04

## 2023-12-04 RX ADMIN — INSULIN HUMAN 11 UNIT(S): 100 INJECTION, SOLUTION SUBCUTANEOUS at 12:04

## 2023-12-04 RX ADMIN — ZINC OXIDE 1 APPLICATION(S): 200 OINTMENT TOPICAL at 12:03

## 2023-12-04 RX ADMIN — SIMETHICONE 80 MILLIGRAM(S): 80 TABLET, CHEWABLE ORAL at 18:00

## 2023-12-04 RX ADMIN — Medication 1 APPLICATION(S): at 12:02

## 2023-12-04 RX ADMIN — INSULIN HUMAN 20 UNIT(S): 100 INJECTION, SOLUTION SUBCUTANEOUS at 05:29

## 2023-12-04 RX ADMIN — CHLORHEXIDINE GLUCONATE 1 APPLICATION(S): 213 SOLUTION TOPICAL at 05:43

## 2023-12-04 RX ADMIN — Medication 200 MILLIGRAM(S): at 05:30

## 2023-12-04 RX ADMIN — LIDOCAINE 1 PATCH: 4 CREAM TOPICAL at 23:46

## 2023-12-04 RX ADMIN — Medication 2 DROP(S): at 05:28

## 2023-12-04 RX ADMIN — Medication 15 MILLILITER(S): at 12:03

## 2023-12-04 RX ADMIN — SODIUM CHLORIDE 4 MILLILITER(S): 9 INJECTION INTRAMUSCULAR; INTRAVENOUS; SUBCUTANEOUS at 17:15

## 2023-12-04 RX ADMIN — SIMETHICONE 80 MILLIGRAM(S): 80 TABLET, CHEWABLE ORAL at 05:27

## 2023-12-04 RX ADMIN — Medication 3 MILLILITER(S): at 17:15

## 2023-12-04 RX ADMIN — CHLORHEXIDINE GLUCONATE 15 MILLILITER(S): 213 SOLUTION TOPICAL at 18:00

## 2023-12-04 RX ADMIN — NYSTATIN CREAM 1 APPLICATION(S): 100000 CREAM TOPICAL at 14:20

## 2023-12-04 RX ADMIN — Medication 5 MILLIGRAM(S): at 21:48

## 2023-12-04 NOTE — PROGRESS NOTE ADULT - ASSESSMENT
74 y/o F w/h/o T2DM with unknown control due to anemia of chronic disease skewing A1C levels. On Levemir and Humalog insulin PTA. Unknown DM complications. Also h/o HTN, HLD, anemia, hypothyroidism, RA, fibromyalgia, remote cerebral aneurysm repair, acute hypercapnic respiratory failure now with tracheostomy, PEG tube, and bedbound (trach change 8/18, PEG change 8/14), sacral pressure ulcer, BIBEMS from home for 6 day hx of bleeding from the tracheostomy and PEG tube with associated abdominal pain> now resolved. Endocrinology consulted for hyperglycemia. Tolerating TFs form 6am to 2am with BG levels improving. High BG levels at 12noon after steroid doses and coming down later on as steroid hyperglycemic effect wears off. Will continue to adjust insulin accordingly to BG goal 100 to low 200s due to age and comorbidities and AMS.    Noted getting synthroid and PPU almost at the same time. PPI needs to be given at 8am instead.    Home medications: Levemir 14 units qhs, Humalog Moderate dose scale (4 units for bg 201-250, 6 units for bg 251-300 , 8 units 301-350) while on [employer] tube feeding 975 ML at home. Per EMR pt get 18h TFs at home but per daughter pt gets 1 bag of TF a day until is completed regardless of the time it takes to finish. If TsF are held for ADLs this is added until TF bag finishes regardless of time.                   74 y/o F w/h/o T2DM with unknown control due to anemia of chronic disease skewing A1C levels. On Levemir and Humalog insulin PTA. Unknown DM complications. Also h/o HTN, HLD, anemia, hypothyroidism, RA, fibromyalgia, remote cerebral aneurysm repair, acute hypercapnic respiratory failure now with tracheostomy, PEG tube, and bedbound (trach change 8/18, PEG change 8/14), sacral pressure ulcer, BIBEMS from home for 6 day hx of bleeding from the tracheostomy and PEG tube with associated abdominal pain> now resolved. Endocrinology consulted for hyperglycemia. Tolerating TFs form 6am to 2am with BG levels improving. High BG levels at 12noon after steroid doses and coming down later on as steroid hyperglycemic effect wears off. Will continue to adjust insulin accordingly to BG goal 100 to low 200s due to age and comorbidities and AMS.    Noted getting synthroid and PPU almost at the same time. PPI needs to be given at 8am instead.    Home medications: Levemir 14 units qhs, Humalog Moderate dose scale (4 units for bg 201-250, 6 units for bg 251-300 , 8 units 301-350) while on [employer] tube feeding 975 ML at home. Per EMR pt get 18h TFs at home but per daughter pt gets 1 bag of TF a day until is completed regardless of the time it takes to finish. If TsF are held for ADLs this is added until TF bag finishes regardless of time.

## 2023-12-04 NOTE — PROGRESS NOTE ADULT - PROBLEM SELECTOR PLAN 1
Found w/ Bilateral PNA vs Atelectasis, and Possible OM Sacrum   - S/p Chest CT (10/28):  c/w Bilateral PNA vs Atelectasis   - s/p Vanco, Aztreonam   - Blood cx: 11/12 -- neg   - urine culture: negative   - Sputum cx + Pseudomonas aeruginosa, S. aureus  - Sputum Cx 11/6: + PSA and Serratia, Cefepime 11/8 -->11/15  - 11/22 & 11/26 blood cultures NGTD  - 11/26 sputum culture MDR pseudomonas - clinically stable, defer treatment unless decompensates as per ID  - 11/26 urine culture - enterococcus - no need to treat - cfu low, organism not significant as per ID  -- see tx of sacral OM below Found w/ Bilateral PNA vs Atelectasis, and Possible OM Sacrum   - S/p Chest CT (10/28):  c/w Bilateral PNA vs Atelectasis   - s/p Vanco, Aztreonam   - Blood cx: 11/12 -- neg   - Sputum cx + Pseudomonas aeruginosa, S. aureus  - Sputum Cx 11/6: + PSA and Serratia, Cefepime 11/8 -->11/15  - 11/26 sputum culture MDR pseudomonas - clinically stable, defer treatment unless decompensates as per ID  - 11/26 urine culture - enterococcus - no need to treat - cfu low, organism not significant as per ID  -- see tx of sacral OM below

## 2023-12-04 NOTE — PROGRESS NOTE ADULT - PROBLEM SELECTOR PLAN 5
- Home amlodipine held on admission, restarted -- continue monitoring BP   - Echo from 10/17/2020 notable for hyperdynamic L ventricular systolic function, moderately severe LVOT obstruction, and EF 81% (per Wilkinson calculation) vs 77% (per Teichholz calculation)    - f/u w Thyroid panel - Home amlodipine held on admission, restarted -- continue monitoring BP   - Echo from 10/17/2020 notable for hyperdynamic L ventricular systolic function, moderately severe LVOT obstruction, and EF 81% (per Wilkinson calculation) vs 77% (per Teichholz calculation)

## 2023-12-04 NOTE — CONSULT NOTE ADULT - PROBLEM SELECTOR PROBLEM 2
How Severe Is Your Skin Lesion?: moderate
Ear itching
Has Your Skin Lesion Been Treated?: not been treated
Is This A New Presentation, Or A Follow-Up?: Skin Lesion
Acute respiratory failure with hypoxia
Steroid-induced hyperglycemia
Sepsis, unspecified organism

## 2023-12-04 NOTE — PROGRESS NOTE ADULT - NSPROGADDITIONALINFOA_GEN_ALL_CORE
-Plan discussed with pt/team.  Contact info: [phone] (24/7). pager [phone]  Amion on [employer]  Teams on M-T-W-F. Unavailable Thu/Weekends/Holidays  Assessed pt/labs/meds and discussed plan of care with primary team  Adjusting insulin  Discharge plan  Follow up care -Plan discussed with pt/team.  Contact info: [phone] (24/7). pager [phone]  Amion on [employer]  Teams on M-T-W-F. Unavailable Thu/Weekends/Holidays  Assessed pt/labs/meds and discussed plan of care with primary team  Adjusting insulin  Discharge plan  Follow up care

## 2023-12-04 NOTE — PROGRESS NOTE ADULT - PROBLEM SELECTOR PLAN 4
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   - Bowel regimen  - PEG Site appears macerated. Multifactorial, possible the PEG has been too tight at home.  - Peg loosened by GI at beside, no leakage or further intervention required   - recurrent RLQ abdominal pain and bleeding at the PEG site  - reevaluated by GI -- no bleeding at the PEG site  - no BM since 11/19 -- increased dose of Senna and increased Miralax to BID   - 11/28 frequent multiple BMs, senna and Miralax d/c'd - GI evaluated  - Continue Simethicone.  - 12/1: s/p abdominal ultrasound - limited study - CT A/P if signs of worsening abdominal pain  - (12/3): Pt is c/o abd pain, and daughter is concerned - plan to obtain CT A&P w IV contrast  - AM amylase and Lipase all wnl 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   - Bowel regimen  - PEG Site appears macerated. Multifactorial, possible the PEG has been too tight at home.  - Peg loosened by GI at beside, no leakage or further intervention required   - recurrent RLQ abdominal pain and bleeding at the PEG site  - reevaluated by GI -- no bleeding at the PEG site  - no BM since 11/19 -- increased dose of Senna and increased Miralax to BID   - 11/28 frequent multiple BMs, senna and Miralax d/c'd - GI evaluated  - Continue Simethicone.  - 12/1: s/p abdominal ultrasound - limited study - CT A/P if signs of worsening abdominal pain  - (12/3): Pt is c/o abd pain, and daughter is concerned   - AM amylase and Lipase all wnl , CT A/P - no acute findings 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   - Bowel regimen  - PEG Site appears macerated. Multifactorial, possible the PEG has been too tight at home.  - Peg loosened by GI at beside, no leakage or further intervention required   - recurrent RLQ abdominal pain and bleeding at the PEG site  - reevaluated by GI -- no bleeding at the PEG site  - no BM since 11/19 -- increased dose of Senna and increased Miralax to BID   - 11/28 frequent multiple BMs, senna and Miralax d/c'd - GI evaluated  - Continue Simethicone.  - 12/1: s/p abdominal ultrasound - limited study   - (12/3): Pt is c/o abd pain, and daughter is concerned   - AM amylase and Lipase all wnl , CT A/P 12/3 - no acute findings

## 2023-12-04 NOTE — CONSULT NOTE ADULT - ASSESSMENT
74 y/o F with PMHx of T2DM, HTN, HLD, anemia, hypothyroidism, RA, fibromyalgia, remote cerebral aneurysm repair, acute hypercapnic respiratory failure now with tracheostomy, PEG tube, and bedbound (trach change 8/18, PEG change 8/14), sacral pressure ulcer, BIBEMS from home for 6 day hx of bleeding from the tracheostomy and PEG tube with associated abdominal pain, recent history of auditory and visual hallucinations, and with chronic sacral decubitus ulcer.  Palliative care called for goals of care

## 2023-12-04 NOTE — PROGRESS NOTE ADULT - SUBJECTIVE AND OBJECTIVE BOX
Patient is a 73y Female     Patient is a 73y old  Female who presents with a chief complaint of Respiratory failure (01 Dec 2023 16:16)      HPI:  74 y/o F with PMHx of T2DM, HTN, HLD, anemia, hypothyroidism, RA, fibromyalgia, remote cerebral aneurysm repair, acute hypercapnic respiratory failure now with tracheostomy, PEG tube, and bedbound (trach change 8/18, PEG change 8/14), sacral pressure ulcer, BIBEMS from home for 6 day hx of bleeding from the tracheostomy and PEG tube with associated abdominal pain. Patient still having regular bowel movements, last one occurred prior to ED arrival. Was seen outpatient on 10/26 by critical care Dr. Zaki Gottlieb and apparently had cultures sent at that time. Patient also noted to have chronic sacral decubitous ulcer. Family endorsed one episode of fever, though was not febrile on evaluation. Daughter noted patient was recently experiencing auditory and visual hallucinations (seeing animals that were not there & hearing a "bomb" going off outside her home), though patient not actively hallucinating on evaluation.    ED course notable for CT neck imaging showing no evidence of active bleeding around the tracheostomy site, no hematomas, and no drainable collection around the tracheostomy site. CT abdomen/pelvis notable for gastrostomy tube localized to the stomach with mild thickening noted along the tract; a sacral decubitus ulcer extending to the coccyx with osseous remodeling, possibly representing osteomyelitis; and bibasilar patchy opacities with volume loss in the lungs, representing atelectasis vs infection. Patient admitted for respiratory support, wound care of tracheostomy and PEG, and management of presumed sacral osteomyelitis.   (28 Oct 2023 04:18)      PAST MEDICAL & SURGICAL HISTORY:  Diabetes      Rheumatoid arthritis      Fibromyalgia      Hypothyroid      Hypertension      H/O tracheostomy      PEG (percutaneous endoscopic gastrostomy) status          MEDICATIONS  (STANDING):  albuterol/ipratropium for Nebulization 3 milliLiter(s) Nebulizer every 6 hours  amLODIPine   Tablet 10 milliGRAM(s) Oral daily  artificial  tears Solution 2 Drop(s) Both EYES four times a day  ascorbic acid 500 milliGRAM(s) Oral daily  calcium carbonate    500 mG (Tums) Chewable 1 Tablet(s) Chew every 12 hours  cephalexin 500 milliGRAM(s) Oral every 6 hours  chlorhexidine 0.12% Liquid 15 milliLiter(s) Oral Mucosa every 12 hours  chlorhexidine 4% Liquid 1 Application(s) Topical <User Schedule>  ciprofloxacin   IVPB 400 milliGRAM(s) IV Intermittent every 12 hours  dextrose 50% Injectable 12.5 Gram(s) IV Push once  dextrose 50% Injectable 25 Gram(s) IV Push once  dextrose 50% Injectable 25 Gram(s) IV Push once  dextrose 50% Injectable 50 milliLiter(s) IV Push once  dextrose Oral Gel 15 Gram(s) Oral once  escitalopram 10 milliGRAM(s) Oral <User Schedule>  gabapentin Solution 100 milliGRAM(s) Enteral Tube at bedtime  glucagon  Injectable 1 milliGRAM(s) IntraMuscular once  hemorrhoidal Ointment      hemorrhoidal Ointment 1 Application(s) Rectal daily  insulin glargine Injectable (LANTUS) 20 Unit(s) SubCutaneous every morning  insulin lispro (ADMELOG) corrective regimen sliding scale   SubCutaneous every 6 hours  insulin regular  human recombinant 11 Unit(s) SubCutaneous <User Schedule>  insulin regular  human recombinant 20 Unit(s) SubCutaneous <User Schedule>  levothyroxine 125 MICROGram(s) Oral <User Schedule>  lidocaine   4% Patch 1 Patch Transdermal daily  melatonin 5 milliGRAM(s) Oral at bedtime  melatonin 1 milliGRAM(s) Oral at bedtime  multivitamin/minerals/iron Oral Solution (CENTRUM) 15 milliLiter(s) Oral daily  nystatin Powder 1 Application(s) Topical every 8 hours  oxybutynin 5 milliGRAM(s) Oral daily  pantoprazole   Suspension 40 milliGRAM(s) Enteral Tube <User Schedule>  petrolatum Ophthalmic Ointment 1 Application(s) Both EYES four times a day  predniSONE   Tablet 5 milliGRAM(s) Oral daily  QUEtiapine 12.5 milliGRAM(s) Oral <User Schedule>  simethicone 80 milliGRAM(s) Chew four times a day  sodium chloride 3%  Inhalation 4 milliLiter(s) Inhalation every 12 hours  zinc oxide 40% Paste 1 Application(s) Topical daily      Allergies    penicillin (Unknown)  heparin (Unknown)  Tagamet (Unknown)  pineapple (Unknown)  walnut (Unknown)  Pecan, Filbert, Hazelnut (Unknown)  Lyrica (Unknown)    Intolerances        SOCIAL HISTORY:  Denies ETOh,Smoking,     FAMILY HISTORY:      REVIEW OF SYSTEMS:    CONSTITUTIONAL: No weakness, fevers or chills  EYES/ENT: No visual changes;  No vertigo or throat pain   NECK: No pain or stiffness  RESPIRATORY: No cough, wheezing, hemoptysis; No shortness of breath  CARDIOVASCULAR: No chest pain or palpitations  GASTROINTESTINAL: No abdominal or epigastric pain. No nausea, vomiting, or hematemesis; No diarrhea or constipation. No melena or hematochezia.  GENITOURINARY: No dysuria, frequency or hematuria  NEUROLOGICAL: No numbness or weakness  SKIN: No itching, burning, rashes, or lesions   All other review of systems is negative unless indicated above.    VITAL:  T(C): , Max: 38 (12-03-23 @ 20:57)  T(F): , Max: 100.4 (12-03-23 @ 20:57)  HR: 77 (12-04-23 @ 05:36)  BP: 146/54 (12-04-23 @ 05:00)  BP(mean): --  RR: 17 (12-04-23 @ 05:00)  SpO2: 100% (12-04-23 @ 05:36)  Wt(kg): --    I and O's:    12-02 @ 07:01  -  12-03 @ 07:00  --------------------------------------------------------  IN: 1830 mL / OUT: 1100 mL / NET: 730 mL    12-03 @ 07:01  -  12-04 @ 07:00  --------------------------------------------------------  IN: 1870 mL / OUT: 1400 mL / NET: 470 mL          PHYSICAL EXAM:    Constitutional: NAD  HEENT: PERRLA,   Neck: No JVD  Respiratory: CTA B/L  Cardiovascular: S1 and S2  Gastrointestinal: BS+, soft, NT/ND  Extremities: No peripheral edema  Neurological: A/O x 3, no focal deficits  Psychiatric: Normal mood, normal affect  : No Mcbride  Skin: No rashes  Access: Not applicable  Back: No CVA tenderness    LABS:                        8.8    5.92  )-----------( 102      ( 04 Dec 2023 07:00 )             30.0     12-04    137  |  101  |  17  ----------------------------<  200<H>  3.4<L>   |  25  |  <0.30<L>    Ca    9.4      04 Dec 2023 06:58  Phos  4.6     12-04  Mg     1.7     12-04    TPro  7.0  /  Alb  3.3  /  TBili  0.4  /  DBili  x   /  AST  22  /  ALT  22  /  AlkPhos  50  12-03          RADIOLOGY & ADDITIONAL STUDIES:

## 2023-12-04 NOTE — PROGRESS NOTE ADULT - SUBJECTIVE AND OBJECTIVE BOX
Patient is a 73y old  Female who presents with a chief complaint of Respiratory failure (01 Dec 2023 16:16)      Interval Events:    REVIEW OF SYSTEMS:  [ ] Positive  [ ] All other systems negative  [ ] Unable to assess ROS because ________    Vital Signs Last 24 Hrs  T(C): 36.5 (12-04-23 @ 05:00), Max: 38 (12-03-23 @ 20:57)  T(F): 97.7 (12-04-23 @ 05:00), Max: 100.4 (12-03-23 @ 20:57)  HR: 77 (12-04-23 @ 05:36) (74 - 100)  BP: 146/54 (12-04-23 @ 05:00) (116/63 - 146/54)  RR: 17 (12-04-23 @ 05:00) (16 - 20)  SpO2: 100% (12-04-23 @ 05:36) (95% - 100%)    PHYSICAL EXAM:  HEENT:   [ ]Tracheostomy:  [ ]Pupils equal  [ ]No oral lesions  [ ]Abnormal    SKIN  [ ]No Rash  [ ] Abnormal  [ ] pressure    CARDIAC  [ ]Regular  [ ]Abnormal    PULMONARY  [ ]Bilateral Clear Breath Sounds  [ ]Normal Excursion  [ ]Abnormal    GI  [ ]PEG      [ ] +BS		              [ ]Soft, nondistended, nontender	  [ ]Abnormal    MUSCULOSKELETAL                                   [ ]Bedbound                 [ ]Abnormal    [ ]Ambulatory/OOB to chair                           EXTREMITIES                                         [ ]Normal  [ ]Edema                           NEUROLOGIC  [ ] Normal, non focal  [ ] Focal findings:    PSYCHIATRIC  [ ]Alert and appropriate  [ ] Sedated	 [ ]Agitated    :  Mcbride: [ ] Yes, if yes: Date of Placement:                   [  ] No    LINES: Central Lines [ ] Yes, if yes: Date of Placement                                     [  ] No    HOSPITAL MEDICATIONS:  MEDICATIONS  (STANDING):  albuterol/ipratropium for Nebulization 3 milliLiter(s) Nebulizer every 6 hours  amLODIPine   Tablet 10 milliGRAM(s) Oral daily  artificial  tears Solution 2 Drop(s) Both EYES four times a day  ascorbic acid 500 milliGRAM(s) Oral daily  calcium carbonate    500 mG (Tums) Chewable 1 Tablet(s) Chew every 12 hours  cephalexin 500 milliGRAM(s) Oral every 6 hours  chlorhexidine 0.12% Liquid 15 milliLiter(s) Oral Mucosa every 12 hours  chlorhexidine 4% Liquid 1 Application(s) Topical <User Schedule>  ciprofloxacin   IVPB 400 milliGRAM(s) IV Intermittent every 12 hours  dextrose 50% Injectable 25 Gram(s) IV Push once  dextrose 50% Injectable 12.5 Gram(s) IV Push once  dextrose 50% Injectable 25 Gram(s) IV Push once  dextrose 50% Injectable 50 milliLiter(s) IV Push once  dextrose Oral Gel 15 Gram(s) Oral once  escitalopram 10 milliGRAM(s) Oral <User Schedule>  gabapentin Solution 100 milliGRAM(s) Enteral Tube at bedtime  glucagon  Injectable 1 milliGRAM(s) IntraMuscular once  hemorrhoidal Ointment 1 Application(s) Rectal daily  hemorrhoidal Ointment      insulin glargine Injectable (LANTUS) 20 Unit(s) SubCutaneous every morning  insulin lispro (ADMELOG) corrective regimen sliding scale   SubCutaneous every 6 hours  insulin regular  human recombinant 20 Unit(s) SubCutaneous <User Schedule>  insulin regular  human recombinant 11 Unit(s) SubCutaneous <User Schedule>  levothyroxine 125 MICROGram(s) Oral <User Schedule>  lidocaine   4% Patch 1 Patch Transdermal daily  melatonin 5 milliGRAM(s) Oral at bedtime  melatonin 1 milliGRAM(s) Oral at bedtime  multivitamin/minerals/iron Oral Solution (CENTRUM) 15 milliLiter(s) Oral daily  nystatin Powder 1 Application(s) Topical every 8 hours  oxybutynin 5 milliGRAM(s) Oral daily  pantoprazole   Suspension 40 milliGRAM(s) Enteral Tube <User Schedule>  petrolatum Ophthalmic Ointment 1 Application(s) Both EYES four times a day  predniSONE   Tablet 5 milliGRAM(s) Oral daily  QUEtiapine 12.5 milliGRAM(s) Oral <User Schedule>  simethicone 80 milliGRAM(s) Chew four times a day  sodium chloride 3%  Inhalation 4 milliLiter(s) Inhalation every 12 hours  zinc oxide 40% Paste 1 Application(s) Topical daily    MEDICATIONS  (PRN):  acetaminophen   Oral Liquid .. 1000 milliGRAM(s) Enteral Tube every 6 hours PRN Temp greater or equal to 38C (100.4F), Mild Pain (1 - 3)  benzocaine 20% Spray 1 Spray(s) Topical four times a day PRN Cough  diclofenac sodium 1% Gel 2 Gram(s) Topical four times a day PRN pain  diphenhydrAMINE Elixir 25 milliGRAM(s) Oral every 6 hours PRN Rash and/or Itching  guaifenesin/dextromethorphan Oral Liquid 10 milliLiter(s) Oral every 6 hours PRN Cough  oxyCODONE    Solution 2.5 milliGRAM(s) Oral every 6 hours PRN Moderate Pain (4 - 6)  sodium chloride 0.65% Nasal 1 Spray(s) Both Nostrils every 6 hours PRN Nasal Congestion      LABS:                        8.8    5.92  )-----------( 102      ( 04 Dec 2023 07:00 )             30.0     12-03    138  |  99  |  20  ----------------------------<  142<H>  3.9   |  27  |  <0.30<L>    Ca    9.5      03 Dec 2023 06:37  Phos  5.0     12-03  Mg     1.8     12-03    TPro  7.0  /  Alb  3.3  /  TBili  0.4  /  DBili  x   /  AST  22  /  ALT  22  /  AlkPhos  50  12-03      Urinalysis Basic - ( 03 Dec 2023 06:37 )    Color: x / Appearance: x / SG: x / pH: x  Gluc: 142 mg/dL / Ketone: x  / Bili: x / Urobili: x   Blood: x / Protein: x / Nitrite: x   Leuk Esterase: x / RBC: x / WBC x   Sq Epi: x / Non Sq Epi: x / Bacteria: x          CAPILLARY BLOOD GLUCOSE    MICROBIOLOGY:     RADIOLOGY:  [ ] Reviewed and interpreted by me    Mode: AC/ CMV (Assist Control/ Continuous Mandatory Ventilation)  RR (machine): 12  TV (machine): 300  FiO2: 30  PEEP: 5  ITime: 1  MAP: 8  PIP: 23   Patient is a 73y old  Female who presents with a chief complaint of Respiratory failure (01 Dec 2023 16:16)      Interval Events:  CT Abdomen - w/ distended bladder.                                 Straight cath'd for 400mL.    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    CARDIAC  [X]Regular  [ ]Abnormal    PULMONARY  [ ]Bilateral Clear Breath Sounds  [ ]Normal Excursion  [X]Abnormal - BL Coarse BS    GI  [X]PEG      [X] +BS		              [X]Soft, nondistended, nontender	  [ ]Abnormal    MUSCULOSKELETAL                                   [X]Bedbound                 [ ]Abnormal    [ ]Ambulatory/OOB to chair                           EXTREMITIES                                         [X]Normal  [ ]Edema                           NEUROLOGIC  [ ] Normal, non focal  [X] Focal findings: opens eyes spontaneously, follows commands on all 4 extremities, able to mouth words    PSYCHIATRIC  [X]Alert and appropriate  [ ] Sedated	 [ ]Agitated    :  Mcbride: [ ] Yes, if yes: Date of Placement:                   [X] No    LINES: Central Lines [ ] Yes, if yes: Date of Placement                                     [X] No    HOSPITAL MEDICATIONS:  MEDICATIONS  (STANDING):  albuterol/ipratropium for Nebulization 3 milliLiter(s) Nebulizer every 6 hours  amLODIPine   Tablet 10 milliGRAM(s) Oral daily  artificial  tears Solution 2 Drop(s) Both EYES four times a day  ascorbic acid 500 milliGRAM(s) Oral daily  calcium carbonate    500 mG (Tums) Chewable 1 Tablet(s) Chew every 12 hours  cephalexin 500 milliGRAM(s) Oral every 6 hours  chlorhexidine 0.12% Liquid 15 milliLiter(s) Oral Mucosa every 12 hours  chlorhexidine 4% Liquid 1 Application(s) Topical <User Schedule>  ciprofloxacin   IVPB 400 milliGRAM(s) IV Intermittent every 12 hours  dextrose 50% Injectable 25 Gram(s) IV Push once  dextrose 50% Injectable 12.5 Gram(s) IV Push once  dextrose 50% Injectable 25 Gram(s) IV Push once  dextrose 50% Injectable 50 milliLiter(s) IV Push once  dextrose Oral Gel 15 Gram(s) Oral once  escitalopram 10 milliGRAM(s) Oral <User Schedule>  gabapentin Solution 100 milliGRAM(s) Enteral Tube at bedtime  glucagon  Injectable 1 milliGRAM(s) IntraMuscular once  hemorrhoidal Ointment 1 Application(s) Rectal daily  hemorrhoidal Ointment      insulin glargine Injectable (LANTUS) 20 Unit(s) SubCutaneous every morning  insulin lispro (ADMELOG) corrective regimen sliding scale   SubCutaneous every 6 hours  insulin regular  human recombinant 20 Unit(s) SubCutaneous <User Schedule>  insulin regular  human recombinant 11 Unit(s) SubCutaneous <User Schedule>  levothyroxine 125 MICROGram(s) Oral <User Schedule>  lidocaine   4% Patch 1 Patch Transdermal daily  melatonin 5 milliGRAM(s) Oral at bedtime  melatonin 1 milliGRAM(s) Oral at bedtime  multivitamin/minerals/iron Oral Solution (CENTRUM) 15 milliLiter(s) Oral daily  nystatin Powder 1 Application(s) Topical every 8 hours  oxybutynin 5 milliGRAM(s) Oral daily  pantoprazole   Suspension 40 milliGRAM(s) Enteral Tube <User Schedule>  petrolatum Ophthalmic Ointment 1 Application(s) Both EYES four times a day  predniSONE   Tablet 5 milliGRAM(s) Oral daily  QUEtiapine 12.5 milliGRAM(s) Oral <User Schedule>  simethicone 80 milliGRAM(s) Chew four times a day  sodium chloride 3%  Inhalation 4 milliLiter(s) Inhalation every 12 hours  zinc oxide 40% Paste 1 Application(s) Topical daily    MEDICATIONS  (PRN):  acetaminophen   Oral Liquid .. 1000 milliGRAM(s) Enteral Tube every 6 hours PRN Temp greater or equal to 38C (100.4F), Mild Pain (1 - 3)  benzocaine 20% Spray 1 Spray(s) Topical four times a day PRN Cough  diclofenac sodium 1% Gel 2 Gram(s) Topical four times a day PRN pain  diphenhydrAMINE Elixir 25 milliGRAM(s) Oral every 6 hours PRN Rash and/or Itching  guaifenesin/dextromethorphan Oral Liquid 10 milliLiter(s) Oral every 6 hours PRN Cough  oxyCODONE    Solution 2.5 milliGRAM(s) Oral every 6 hours PRN Moderate Pain (4 - 6)  sodium chloride 0.65% Nasal 1 Spray(s) Both Nostrils every 6 hours PRN Nasal Congestion      LABS:                        8.8    5.92  )-----------( 102      ( 04 Dec 2023 07:00 )             30.0     12-03    138  |  99  |  20  ----------------------------<  142<H>  3.9   |  27  |  <0.30<L>    Ca    9.5      03 Dec 2023 06:37  Phos  5.0     12-03  Mg     1.8     12-03    TPro  7.0  /  Alb  3.3  /  TBili  0.4  /  DBili  x   /  AST  22  /  ALT  22  /  AlkPhos  50  12-03      Urinalysis Basic - ( 03 Dec 2023 06:37 )    Color: x / Appearance: x / SG: x / pH: x  Gluc: 142 mg/dL / Ketone: x  / Bili: x / Urobili: x   Blood: x / Protein: x / Nitrite: x   Leuk Esterase: x / RBC: x / WBC x   Sq Epi: x / Non Sq Epi: x / Bacteria: x          CAPILLARY BLOOD GLUCOSE    MICROBIOLOGY:     RADIOLOGY:  [ ] Reviewed and interpreted by me    Mode: AC/ CMV (Assist Control/ Continuous Mandatory Ventilation)  RR (machine): 12  TV (machine): 300  FiO2: 30  PEEP: 5  ITime: 1  MAP: 8  PIP: 23

## 2023-12-04 NOTE — PROGRESS NOTE ADULT - PROBLEM SELECTOR PLAN 2
-c/w levothyroxine 125mcg daily   TSH and free thyroxine wnl  Please make sure LT4 is given on an empty stomach at least one hour apart from other meds and food to ensure med absorption. DO NOT GIVE WITH VITAMINS/ANTACIDS  Noted pt getting LT4 atame time of PPI affecting med absorption.  Schedule LT4 at 5am and PPI at 8am.

## 2023-12-04 NOTE — CONSULT NOTE ADULT - CONVERSATION DETAILS
Met with daughter/surrogate Marysol face to face for greater than 55minutes.   Daughter has a clear understanding of her mothers chronic medical issues and wanted to meet with palliative care to understand the philosophy of hospice , what is palliative care and what options she has for the long term care of her debilitated mother.  I discussed the philosophy of hospice care, including the inability to go to the [facility] with patient on a ventilator and family would need to be in agreement with symptom directed care.   Additionally, discussed the PCU, where patient can transfer to the unit with the vent but there must be an agreement with plan for compassionate extubation .  I explained symptom directed approach to include but not limited to:  no further medical interventions, no iv fluids, no blood draws, no blood glucose monitoring, de escalation of non essential medications no diagnostics and team would determine need for antibiotics .  I explained that the PCU is not a long term care facility and that the unit concentrates on quality of life rather than quantity of life.   Marysol is overwhelmed with the direct care she provides her mother at home, she shares LPN's have been difficult to hire and many times she finds herself alone taking care of her mothers needs.   At this time Marysol will think about these options.    No further role for palliative care, please reconsult with any acute issues

## 2023-12-04 NOTE — PROGRESS NOTE ADULT - NS ATTEND AMEND GEN_ALL_CORE FT
73F with a h/o DM, HTN, HLD, anemia, hypothyroidism, RA, fibromyalgia, remote cerebral aneurysm with repair, hypercapnic respiratory failure s/p tracheostomy/PEG, bedbound, sacral pressure ulcer. Admitted w/ bleeding from tracheostomy and PEG w/ associated abdominal pain, recent hx of auditory/visual hallucinations. Since admission, no further bleeding noted from either trach or PEG. Imaging showed mild thickening along PEG tube tract and sacral ulcer extending to coccyx suggestive of OM.     # Osteomyelitis  # Chronic hypoxemic RF  # oropharyngeal dysphagia  # acute on chronic pain  # Trach/Peg bleeding  # Tracheitis with Pseudomonas and serratia  # Hx of hallucination, anxiety - particularly in setting of prior antibiotics    #Neuro - awake, alert, and interactive. moving all extremities  - Patient presently calm and following commands appropriately. Continue current medications  - Behavioral Health Follow-up as needed   - Sleep remains poor - increased dose of Melatonin (6mg) last night  #Cardiovascular - hemodynamically stable  #Pulmonary - acute on chronic hypoxemic respiratory failure - on home vent after extensive hospitalizations over the prior year since being trached last December - PSV as able though still requiring high support  - Patient phonating while on vent this AM - receiving full volumes on vent  - ENT follow-up as needed - previously changed to 8XLT trach - daughter was upset about previous trach changes but presently patient ventilating in stable fashion on current settings  - Maintain O2 sat > 90%  - Daughter requesting home CO2 monitoring device as unable have ABGs done at home as patient has a history of CO2 increase off ventilator  - Continue hypersal for now  #Gastro - oropharyngeal dysphagia with PEG tube in place - ensure appropriate feeds with Nutrition  - No further diarrhea/loose BM today since stool softeners stopped  - GI evaluation and AXR noted from earlier this week - no signs of obstruction  - More abdominal pain today which is not resolving with gas - therefore will check abd US per d/w GI. Repeat LFTs today.  - History of c. diff previously treated per daughter - if diarrhea recurs off stool softeners will need to repeat testing for this.   #Infectious Disease - sacral osteo based on MRI - wound care follow-up (per daughter had debridement in past at another hospitalization)  - On review of wound care notes there does appear to be some improvement in at least one dimension of wound from admission measurements. The patient has had this wound since a hospitalization in December and per daughter does not have chronic or multiple wounds but only this single wound. Unclear if wound culture is needed will f/u with wound team.  - Continue Cipro and Keflex ass per ID - with plan for 6 week course in total. Will continue Cipro in D5 despite hyperglycemia given improved absorption compared to PO per d/w ID and pharmacy  - Per daughter, patient has had prior multiple infections during hospitalizations including prior pseudomonas, MRSA, E faecalis, and Klebsiella  - Sputum now with MDR Pseudomonas - ID follow-up however would consider holding off targeting this as respiratory status presently stable and no increase in secretions  - Follow-up repeat blood cultures thus far negative. Urine culture (VRE) not likely a true infection per d/w ID  - Daughter reports a recent dental abscess and being told by outpatient dentist that patient needed teeth extracted - Dental evaluation noted with no plans for intervention as inpatient.   - Leukocytosis waxing/waning last few days but no fevers and clinically patient looks improved. Will continue to monitor  #Hem/Onc - prior cytopenias in setting of antibiotics per patient's daughter   - Required PRBC transfusion over the weekend for Hb < 8 but no clear signs of bleeding  - Hem/Onc follow-up noted - suspect an anemia of chronic disease  - Daughter told by a prior Hematologist that her Hb must remain above 8 - though unclear whether this was in relation to any particular finding or just clinical gestalt  #Pain - continue current pain control - in setting of fibromyalgia and pain from wound  - restarted Oxy after discussion with patient's daughter yesterday - improvement noted  #Derm - previously seen by derm for skin lesions - mid back with irritated seborrheic keratosis - outpatient follow-up  #Endo - DM2 - continue insulin and adjust as needed for goal FS < 180  - Endocrine follow-up for assistance with hyperglycemia management  - Continue Synthroid - TSH WNL  #DVT proph - SCD  - Prior prophylactic AC stopped after concern for prior bleeding    Prognosis guarded    I spoke with patient's   this AM during rounds for about 15 minutes. All questions answered.- IV antibiotics are going to be needed til about 12/10 per ID.   - 73F with a h/o DM, HTN, HLD, anemia, hypothyroidism, RA, fibromyalgia, remote cerebral aneurysm with repair, hypercapnic respiratory failure s/p tracheostomy/PEG, bedbound, sacral pressure ulcer. Admitted w/ bleeding from tracheostomy and PEG w/ associated abdominal pain, recent hx of auditory/visual hallucinations. Since admission, no further bleeding noted from either trach or PEG. Imaging showed mild thickening along PEG tube tract and sacral ulcer extending to coccyx suggestive of OM.     # Osteomyelitis  # Chronic hypoxemic RF  # oropharyngeal dysphagia  # acute on chronic pain  # Trach/Peg bleeding  # Tracheitis with Pseudomonas and serratia  # Hx of hallucination, anxiety - particularly in setting of prior antibiotics    #Neuro - awake, alert, and interactive. moving all extremities  - Patient presently calm and following commands appropriately. Continue current medications  - Behavioral Health Follow-up as needed   - c/w Melatonin 6mg at bedtime for sleep   #Cardiovascular - hemodynamically stable  #Pulmonary - acute on chronic hypoxemic respiratory failure - on home vent after extensive hospitalizations over the prior year since being trached last December - PSV as able though still requiring high support  - ENT follow-up as needed - previously changed to 8XLT trach   - Maintain O2 sat > 90%  - Continue hypersal for now  #Gastro - oropharyngeal dysphagia with PEG tube in place, tolerating, nutrition follow up appreciated   - Abdominal pain- abdominal sono 12/1 -No definite sonographic evidence for acute cholecystitis.  CT abdomen 12/4- No acute intra-abdominal findings. Sacral decubitus wound, stable to mildly improved. Bibasilar lung consolidation. Scattered nodular opacities in the visualized lung base  Urinary retention likely the cause of her abdominal discomfort- noted on bladder scans and requiring straight cath- d/c Oxybutynin and monitor urine output  -  No further diarrhea/loose BM today since stool softeners stopped  - History of c. diff previously treated per daughter - if diarrhea recurs off stool softeners will need to repeat testing for this.   #Infectious Disease - sacral osteo based on MRI - wound care follow-up (per daughter had debridement in past at another hospitalization)  - On review of wound care notes there does appear to be some improvement in at least one dimension of wound from admission measurements. The patient has had this wound since a hospitalization in December and per daughter does not have chronic or multiple wounds but only this single wound. Unclear if wound culture is needed will f/u with wound team.  - Continue Cipro and Keflex ass per ID - with plan for 6 week course in total (approx 12/10). Will continue Cipro in D5 despite hyperglycemia given improved absorption compared to PO per d/w ID and pharmacy  - Per daughter, patient has had prior multiple infections during hospitalizations including prior pseudomonas, MRSA, E faecalis, and Klebsiella  - Sputum now with MDR Pseudomonas - ID follow-up however would consider holding off targeting this as respiratory status and secretions are stable   - Follow-up repeat blood cultures thus far negative. Urine culture (VRE) not likely a true infection per d/w ID  - Daughter reports a recent dental abscess and being told by outpatient dentist that patient needed teeth extracted - Dental evaluation noted with no plans for intervention as inpatient.   - Leukocytosis waxing/waning last few days but no fevers and clinically patient looks improved. Will continue to monitor  #Hem/Onc - prior cytopenias in setting of antibiotics per patient's daughter   - Hem/Onc follow-up noted - suspect an anemia of chronic disease  #Pain - continue current pain control - in setting of fibromyalgia and pain from wound  - Voltaren topical, lidocaine patches and low dose Oxycodone as needed  #Derm - previously seen by derm for skin lesions - mid back with irritated seborrheic keratosis - outpatient follow-up  #Endo - DM2 - continue insulin and adjust as needed for goal FS < 180  - Endocrine follow-up for assistance with hyperglycemia management  - Continue Synthroid - TSH WNL  #DVT proph - SCD  - Prior prophylactic AC stopped after concern for prior bleeding  Above was discussed with the patient's daughter and  at bedside, all questions answered

## 2023-12-04 NOTE — PROGRESS NOTE ADULT - SUBJECTIVE AND OBJECTIVE BOX
DIABETES FOLLOW UP NOTE: Saw pt earlier today    Chief Complaint: Endocrine consult requested for management of T2DM    INTERVAL HX: Pt stable, alert> opened eyes but unable to nod yes/no to questions or follow commands. Per staff, pt tolerating TFs of [employer] glucose support 1.2 at 60cc/hr from 6am to 2am. BG levels improved but noted BG levels high at 12noon coming down around 6pm (< 100s yesterday). No hypoglycemia. On Prednisone 5mg.         Review of Systems:  General: As above  unable       Allergies    penicillin (Unknown)  heparin (Unknown)  Tagamet (Unknown)  pineapple (Unknown)  walnut (Unknown)  Pecan, Filbert, Hazelnut (Unknown)  Lyrica (Unknown)    Intolerances      MEDICATIONS;  cephalexin 500 milliGRAM(s) Oral every 6 hours  ciprofloxacin   IVPB 400 milliGRAM(s) IV Intermittent every 12 hours  insulin glargine Injectable (LANTUS) 20 Unit(s) SubCutaneous every morning  insulin lispro (ADMELOG) corrective regimen sliding scale   SubCutaneous every 6 hours  insulin regular  human recombinant 13 Unit(s) SubCutaneous <User Schedule>  insulin regular  human recombinant 20 Unit(s) SubCutaneous <User Schedule>  levothyroxine 125 MICROGram(s) Oral <User Schedule>  predniSONE   Tablet 5 milliGRAM(s) Oral daily    PHYSICAL EXAM:  VITALS: T(C): 37.1 (12-04-23 @ 14:45)  T(F): 98.7 (12-04-23 @ 14:45), Max: 100.4 (12-03-23 @ 20:57)  HR: 84 (12-04-23 @ 16:39) (74 - 887)  BP: 131/69 (12-04-23 @ 16:39) (118/54 - 146/54)  RR:  (16 - 20)  SpO2:  (95% - 100%)  Wt(kg): --  GENERAL: Female laying on bed in NAD. , daughter and HHA at bedside  HEENT: Trach in place D&I  Abdomen: Soft, nontender, non distended. PEG in place with TFs going at 60cc/hr at time of visit  Extremities: Warm, no edema in all 4 exts  NEURO: Awake, doesn't answer to any questions      LABS:  POCT Blood Glucose.: 191 mg/dL (12-04-23 @ 11:53)  POCT Blood Glucose.: 143 mg/dL (12-04-23 @ 05:22)  POCT Blood Glucose.: 152 mg/dL (12-03-23 @ 23:28)  POCT Blood Glucose.: 85 mg/dL (12-03-23 @ 17:39)  POCT Blood Glucose.: 227 mg/dL (12-03-23 @ 12:16)  POCT Blood Glucose.: 161 mg/dL (12-03-23 @ 05:42)  POCT Blood Glucose.: 94 mg/dL (12-02-23 @ 23:50)  POCT Blood Glucose.: 102 mg/dL (12-02-23 @ 17:55)  POCT Blood Glucose.: 201 mg/dL (12-02-23 @ 12:17)  POCT Blood Glucose.: 143 mg/dL (12-02-23 @ 05:50)  POCT Blood Glucose.: 119 mg/dL (12-01-23 @ 23:54)  POCT Blood Glucose.: 132 mg/dL (12-01-23 @ 17:53)                            8.8    5.92  )-----------( 102      ( 04 Dec 2023 07:00 )             30.0       12-04    137  |  101  |  17  ----------------------------<  200<H>  3.4<L>   |  25  |  <0.30<L>    eGFR: 113    Ca    9.4      12-04  Mg     1.7     12-04  Phos  4.6     12-04    TPro  7.0  /  Alb  3.3  /  TBili  0.4  /  DBili  x   /  AST  22  /  ALT  22  /  AlkPhos  50  12-03    Thyroid Function Tests:  12-04 @ 07:01 TSH 1.57 FreeT4 -- T3 60 Anti TPO -- Anti Thyroglobulin Ab -- TSI --  11-28 @ 06:37 TSH 2.80 FreeT4 1.2 T3 -- Anti TPO -- Anti Thyroglobulin Ab -- TSI --      A1C with Estimated Average Glucose Result: 7.5 % (10-28-23 @ 07:18)      Estimated Average Glucose: 169 mg/dL (10-28-23 @ 07:18)        11-25 Chol -- Direct LDL -- LDL calculated -- HDL -- Trig 192<H>               DIABETES FOLLOW UP NOTE: Saw pt earlier today    Chief Complaint: Endocrine consult requested for management of T2DM    INTERVAL HX: Pt stable, alert> opened eyes but unable to nod yes/no to questions or follow commands. Per staff, pt tolerating TFs of [employer] glucose support 1.2 at 60cc/hr from 6am to 2am. BG levels improved but noted BG levels high at 12noon coming down around 6pm (< 100s yesterday). No hypoglycemia. On Prednisone 5mg.         Review of Systems:  General: As above  unable       Allergies    penicillin (Unknown)  heparin (Unknown)  Tagamet (Unknown)  pineapple (Unknown)  walnut (Unknown)  Pecan, Filbert, Hazelnut (Unknown)  Lyrica (Unknown)    Intolerances      MEDICATIONS;  cephalexin 500 milliGRAM(s) Oral every 6 hours  ciprofloxacin   IVPB 400 milliGRAM(s) IV Intermittent every 12 hours  insulin glargine Injectable (LANTUS) 20 Unit(s) SubCutaneous every morning  insulin lispro (ADMELOG) corrective regimen sliding scale   SubCutaneous every 6 hours  insulin regular  human recombinant 13 Unit(s) SubCutaneous <User Schedule>  insulin regular  human recombinant 20 Unit(s) SubCutaneous <User Schedule>  levothyroxine 125 MICROGram(s) Oral <User Schedule>  predniSONE   Tablet 5 milliGRAM(s) Oral daily    PHYSICAL EXAM:  VITALS: T(C): 37.1 (12-04-23 @ 14:45)  T(F): 98.7 (12-04-23 @ 14:45), Max: 100.4 (12-03-23 @ 20:57)  HR: 84 (12-04-23 @ 16:39) (74 - 887)  BP: 131/69 (12-04-23 @ 16:39) (118/54 - 146/54)  RR:  (16 - 20)  SpO2:  (95% - 100%)  Wt(kg): --  GENERAL: Female laying on bed in NAD. , daughter and HHA at bedside  HEENT: Trach in place D&I  Abdomen: Soft, nontender, non distended. PEG in place with TFs going at 60cc/hr at time of visit  Extremities: Warm, no edema in all 4 exts  NEURO: Awake, doesn't answer to any questions      LABS:  POCT Blood Glucose.: 191 mg/dL (12-04-23 @ 11:53)  POCT Blood Glucose.: 143 mg/dL (12-04-23 @ 05:22)  POCT Blood Glucose.: 152 mg/dL (12-03-23 @ 23:28)  POCT Blood Glucose.: 85 mg/dL (12-03-23 @ 17:39)  POCT Blood Glucose.: 227 mg/dL (12-03-23 @ 12:16)  POCT Blood Glucose.: 161 mg/dL (12-03-23 @ 05:42)  POCT Blood Glucose.: 94 mg/dL (12-02-23 @ 23:50)  POCT Blood Glucose.: 102 mg/dL (12-02-23 @ 17:55)  POCT Blood Glucose.: 201 mg/dL (12-02-23 @ 12:17)  POCT Blood Glucose.: 143 mg/dL (12-02-23 @ 05:50)  POCT Blood Glucose.: 119 mg/dL (12-01-23 @ 23:54)  POCT Blood Glucose.: 132 mg/dL (12-01-23 @ 17:53)                            8.8    5.92  )-----------( 102      ( 04 Dec 2023 07:00 )             30.0       12-04    137  |  101  |  17  ----------------------------<  200<H>  3.4<L>   |  25  |  <0.30<L>    eGFR: 113    Ca    9.4      12-04  Mg     1.7     12-04  Phos  4.6     12-04    TPro  7.0  /  Alb  3.3  /  TBili  0.4  /  DBili  x   /  AST  22  /  ALT  22  /  AlkPhos  50  12-03    Thyroid Function Tests:  12-04 @ 07:01 TSH 1.57 FreeT4 -- T3 60 Anti TPO -- Anti Thyroglobulin Ab -- TSI --  11-28 @ 06:37 TSH 2.80 FreeT4 1.2 T3 -- Anti TPO -- Anti Thyroglobulin Ab -- TSI --      A1C with Estimated Average Glucose Result: 7.5 % (10-28-23 @ 07:18)      Estimated Average Glucose: 169 mg/dL (10-28-23 @ 07:18)        11-25 Chol -- Direct LDL -- LDL calculated -- HDL -- Trig 192<H>

## 2023-12-04 NOTE — CONSULT NOTE ADULT - PROBLEM SELECTOR RECOMMENDATION 9
Source unidentified, MRI of sacrum is pending. Sacral ulcer present on admission. ID is following
- continue with trach care   - clean or change inner cannula daily   - respiratory care to continue with vent settings   - if continue consider bronch for lower eval
- Recommend bronchoscopy to evaluate blood in b/l main stem bronchi prior to trach change  - HOB elevation  - Suction PRN  - Continue trach care  - Care per primary team  - No further ENT intervention at this time   - Call with questions or concerns
PPS 10% trach/peg/vent dependent  Dependent on all  levels of care  Lives home with LPN via Medicaid and daughter assist

## 2023-12-04 NOTE — PROGRESS NOTE ADULT - PROBLEM SELECTOR PLAN 7
- s/p Home Levemir 14 units in morning held on admission as noted w/ hypoglycemia   - Enteral feed changed to [employer] diabetic formula; glucose numbers stabilizing. - s/p Home Levemir 14 units in morning held on admission as noted w/ hypoglycemia   - Enteral feed changed to [employer] diabetic formula; glucose numbers stabilizing.

## 2023-12-04 NOTE — PROGRESS NOTE ADULT - PROBLEM SELECTOR PLAN 8
Continue Home Lexapro 10mg daily   Psych consult appreciated   - Continue Seroquel 12.5mg daily (3PM) Continue Home Lexapro 10mg daily   Psych consult appreciated   - Continue Seroquel 12.5mg daily (6PM)

## 2023-12-04 NOTE — PROGRESS NOTE ADULT - PROBLEM SELECTOR PLAN 1
Test BG q6h while on TFs/NPO  -Change lantus to 20 units daily in am  -Change Regula insulin to 22 at 6am, 11 units at 12 noon and 6pm. PLEASE DO NOT HOLD. CAN GIVE LOWER DOSE IF CONCERN FOR HYPOGLYCEMIA.   -If BG remains difficult to achieve will consider NPH insulin regimen instead,    -C/w moderate admelog correction scale every 6 hours  -Will follow and adjust insulin as needed  DISCHARGE:  -Will be determined according to BG/insulin needs/TFs and steroid therapy at time of discharge.  -Present insulin regimen and TF timing difficult to follow as out pt. Pt has TFs until 3am and also is getting 3 different types of insulins. Will adjust to basal Lantus and regular insulin at home instead of Humalog  -Needs telemedicine as out pt due to bedbound status. Can follow at [facility]. [address] [place] [address] [address]. Phone . Will send email closer to discharge  Make sure pt has Rx for all DM supplies and insulin. Test BG q6h while on TFs/NPO  -Change lantus to 20 units daily in am  -Change Regula insulin to 22 at 6am, 11 units at 12 noon and 6pm. PLEASE DO NOT HOLD. CAN GIVE LOWER DOSE IF CONCERN FOR HYPOGLYCEMIA.   -If BG remains difficult to achieve will consider NPH insulin regimen instead,    -C/w moderate admelog correction scale every 6 hours  -Will follow and adjust insulin as needed  DISCHARGE:  -Will be determined according to BG/insulin needs/TFs and steroid therapy at time of discharge.  -Present insulin regimen and TF timing difficult to follow as out pt. Pt has TFs until 3am and also is getting 3 different types of insulins. Will adjust to basal Lantus and regular insulin at home instead of Humalog  -Needs telemedicine as out pt due to bedbound status. Can follow at [facility]. [address] [place] [address] [address]. Phone . Will send email closer to discharge  Make sure pt has Rx for all DM supplies and insulin.

## 2023-12-04 NOTE — CONSULT NOTE ADULT - PROBLEM SELECTOR PROBLEM 1
Tracheostomy in place
Sepsis, unspecified organism
Tracheostomy in place
Type 2 diabetes mellitus
Quadriplegia, functional

## 2023-12-04 NOTE — CONSULT NOTE ADULT - SUBJECTIVE AND OBJECTIVE BOX
HPI:  74 y/o F with PMHx of T2DM, HTN, HLD, anemia, hypothyroidism, RA, fibromyalgia, remote cerebral aneurysm repair, acute hypercapnic respiratory failure now with tracheostomy, PEG tube, and bedbound (trach change 8/18, PEG change 8/14), sacral pressure ulcer, BIBEMS from home for 6 day hx of bleeding from the tracheostomy and PEG tube with associated abdominal pain. Patient still having regular bowel movements, last one occurred prior to ED arrival. Was seen outpatient on 10/26 by critical care Dr. Zaki Gottlieb and apparently had cultures sent at that time. Patient also noted to have chronic sacral decubitous ulcer. Family endorsed one episode of fever, though was not febrile on evaluation. Daughter noted patient was recently experiencing auditory and visual hallucinations (seeing animals that were not there & hearing a "bomb" going off outside her home), though patient not actively hallucinating on evaluation.    ED course notable for CT neck imaging showing no evidence of active bleeding around the tracheostomy site, no hematomas, and no drainable collection around the tracheostomy site. CT abdomen/pelvis notable for gastrostomy tube localized to the stomach with mild thickening noted along the tract; a sacral decubitus ulcer extending to the coccyx with osseous remodeling, possibly representing osteomyelitis; and bibasilar patchy opacities with volume loss in the lungs, representing atelectasis vs infection. Patient admitted for respiratory support, wound care of tracheostomy and PEG, and management of presumed sacral osteomyelitis.   (28 Oct 2023 04:18)    PERTINENT PM/SXH:   Diabetes    Rheumatoid arthritis    Fibromyalgia    Hypothyroid    Hypertension      H/O tracheostomy    PEG (percutaneous endoscopic gastrostomy) status      FAMILY HISTORY:    Family Hx substance abuse [ ]yes [ ]no  ITEMS NOT CHECKED ARE NOT PRESENT    SOCIAL HISTORY:   Significant other/partner[ ]  Children[x ]  Scientologist/Spirituality:  Substance hx:  [ ]   Tobacco hx:  [ ]   Alcohol hx: [ ]   Home Opioid hx:  [ ] I-Stop Reference No:  Living Situation: [x ]Home  [ ]Long term care  [ ]Rehab [ ]Other    ADVANCE DIRECTIVES:    DNR/MOLST  [ ]  Living Will  [ ]   DECISION MAKER(s):  [ ] Health Care Proxy(s)  [x ] Surrogate(s)  [ ] Guardian           Name(s): Phone Number(s):  Daughter Marysol,  defers to her   BASELINE (I)ADL(s) (prior to admission):  [place]: [ ]Total  [ ] Moderate [ x]Dependent    Allergies    penicillin (Unknown)  heparin (Unknown)  Tagamet (Unknown)  pineapple (Unknown)  walnut (Unknown)  Pecan, Filbert, Hazelnut (Unknown)  Lyrica (Unknown)    Intolerances    MEDICATIONS  (STANDING):  albuterol/ipratropium for Nebulization 3 milliLiter(s) Nebulizer every 6 hours  amLODIPine   Tablet 10 milliGRAM(s) Oral daily  artificial  tears Solution 2 Drop(s) Both EYES four times a day  ascorbic acid 500 milliGRAM(s) Oral daily  calcium carbonate    500 mG (Tums) Chewable 1 Tablet(s) Chew every 12 hours  cephalexin 500 milliGRAM(s) Oral every 6 hours  chlorhexidine 0.12% Liquid 15 milliLiter(s) Oral Mucosa every 12 hours  chlorhexidine 4% Liquid 1 Application(s) Topical <User Schedule>  ciprofloxacin   IVPB 400 milliGRAM(s) IV Intermittent every 12 hours  dextrose 50% Injectable 25 Gram(s) IV Push once  dextrose 50% Injectable 12.5 Gram(s) IV Push once  dextrose 50% Injectable 25 Gram(s) IV Push once  dextrose 50% Injectable 50 milliLiter(s) IV Push once  dextrose Oral Gel 15 Gram(s) Oral once  escitalopram 10 milliGRAM(s) Oral <User Schedule>  gabapentin Solution 100 milliGRAM(s) Enteral Tube at bedtime  glucagon  Injectable 1 milliGRAM(s) IntraMuscular once  hemorrhoidal Ointment      hemorrhoidal Ointment 1 Application(s) Rectal daily  insulin glargine Injectable (LANTUS) 20 Unit(s) SubCutaneous every morning  insulin lispro (ADMELOG) corrective regimen sliding scale   SubCutaneous every 6 hours  insulin regular  human recombinant 20 Unit(s) SubCutaneous <User Schedule>  insulin regular  human recombinant 11 Unit(s) SubCutaneous <User Schedule>  levothyroxine 125 MICROGram(s) Oral <User Schedule>  lidocaine   4% Patch 1 Patch Transdermal daily  melatonin 1 milliGRAM(s) Oral at bedtime  melatonin 5 milliGRAM(s) Oral at bedtime  multivitamin/minerals/iron Oral Solution (CENTRUM) 15 milliLiter(s) Oral daily  nystatin Powder 1 Application(s) Topical every 8 hours  pantoprazole   Suspension 40 milliGRAM(s) Enteral Tube <User Schedule>  petrolatum Ophthalmic Ointment 1 Application(s) Both EYES four times a day  predniSONE   Tablet 5 milliGRAM(s) Oral daily  QUEtiapine 12.5 milliGRAM(s) Oral <User Schedule>  simethicone 80 milliGRAM(s) Chew four times a day  sodium chloride 3%  Inhalation 4 milliLiter(s) Inhalation every 12 hours  zinc oxide 40% Paste 1 Application(s) Topical daily    MEDICATIONS  (PRN):  acetaminophen   Oral Liquid .. 1000 milliGRAM(s) Enteral Tube every 6 hours PRN Temp greater or equal to 38C (100.4F), Mild Pain (1 - 3)  benzocaine 20% Spray 1 Spray(s) Topical four times a day PRN Cough  diclofenac sodium 1% Gel 2 Gram(s) Topical four times a day PRN pain  diphenhydrAMINE Elixir 25 milliGRAM(s) Oral every 6 hours PRN Rash and/or Itching  guaifenesin/dextromethorphan Oral Liquid 10 milliLiter(s) Oral every 6 hours PRN Cough  oxyCODONE    Solution 2.5 milliGRAM(s) Oral every 6 hours PRN Moderate Pain (4 - 6)  sodium chloride 0.65% Nasal 1 Spray(s) Both Nostrils every 6 hours PRN Nasal Congestion    PRESENT SYMPTOMS: [x ]Unable to self-report  [ ] CPOT [ ] PAINADs [ ] RDOS  Source if other than patient:  [ ]Family   [ ]Team     Pain: [ ]yes [ ]no  QOL impact -   Location -                    Aggravating factors -  Quality -  Radiation -  Timing-  Severity (0-10 scale):  Minimal acceptable level (0-10 scale):     CPOT:  0  https://www.sccm.org/getattachment/[email or web address]-4540y2151m7l/Critical-Care-Pain-Observation-Tool-(CPOT)    PAIN AD Score: 0  [email or web address] (press ctrl +  left click to view)    Dyspnea:                           [ ]Mild [ ]Moderate [ ]Severe      RDOS:  0 to 2  minimal or no respiratory distress 0  3  mild distress  4 to 6 moderate distress  >7 severe distress  [email or web address]_Distress_[email or web address] (Ctrl +  left click to view)     Anxiety:                             [ x]Mild [ ]Moderate [ ]Severe  Fatigue:                             [ x]Mild [ ]Moderate [ ]Severe  Nausea:                             [ ]Mild [ ]Moderate [ ]Severe  Loss of appetite:              [ ]Mild [ ]Moderate [ ]Severe  Constipation:                    [ ]Mild [ ]Moderate [ ]Severe    PCSSQ [Palliative Care Spiritual Screening Question]   Severity (0-10):5  Score of 4 or > indicate consideration of Chaplaincy referral.  Chaplaincy Referral: [ ] yes [ ] refused [ ] following  xx deferred     Caregiver [place]? : [ x] yes [ ] no  Social work referral [ ] Patient & Family Centered Care Referral [ ]     Anticipatory Grief Present?: [ x] yes [ ] no  Social work referral [ ]  Patient & Family Centered Care Referral [ ]       Other Symptoms:  [ x]All other review of systems negative     Palliative Performance Status Version 2:   10      %    [email or web address]  PHYSICAL EXAM:  Vital Signs Last 24 Hrs  T(C): 36.5 (04 Dec 2023 05:00), Max: 38 (03 Dec 2023 20:57)  T(F): 97.7 (04 Dec 2023 05:00), Max: 100.4 (03 Dec 2023 20:57)  HR: 83 (04 Dec 2023 12:05) (74 - 100)  BP: 146/54 (04 Dec 2023 05:00) (118/54 - 146/54)  BP(mean): --  RR: 17 (04 Dec 2023 05:00) (16 - 20)  SpO2: 100% (04 Dec 2023 12:05) (95% - 100%)    Parameters below as of 04 Dec 2023 12:05  Patient On (Oxygen Delivery Method): ventilator     I&O's Summary    03 Dec 2023 07:01  -  04 Dec 2023 07:00  --------------------------------------------------------  IN: 1870 mL / OUT: 1400 mL / NET: 470 mL    04 Dec 2023 07:01  -  04 Dec 2023 14:22  --------------------------------------------------------  IN: 0 mL / OUT: 800 mL / NET: -800 mL      GENERAL: [ ]Cachexia    [ x]Alert  [ ]Oriented x   [ ]Lethargic  [ ]Unarousable  [ ]Verbal  [ x]Non-Verbal  Behavioral:   [ ] Anxiety  [ ] Delirium [ ] Agitation [x ] Other  HEENT:  [ ]Normal   [ ]Dry mouth   [x ]ET Tube/Trach  [ ]Oral lesions  PULMONARY:   [ ]Clear [ ]Tachypnea  [ ]Audible excessive secretions   [ ]Rhonchi        [ ]Right [ ]Left [ ]Bilateral  [x ]Crackles        [ ]Right [ ]Left [ x]Bilateral  [ ]Wheezing     [ ]Right [ ]Left [ ]Bilateral  [ ]Diminished breath sounds [ ]right [ ]left [ ]bilateral  CARDIOVASCULAR:    [x ]Regular [ ]Irregular [ ]Tachy  [ ]Red [ ]Murmur [ ]Other  GASTROINTESTINAL:  [ x]Soft  [ ]Distended   [x ]+BS  [ ]Non tender [ ]Tender  [ ]Other [ ]PEG [ ]OGT/ NGT  Last BM:  GENITOURINARY:  [ ]Normal [ x] Incontinent   [ ]Oliguria/Anuria   [ ]Mcbride  MUSCULOSKELETAL:   [ ]Normal   [ ]Weakness  [ x]Bed/Wheelchair bound [ ]Edema  NEUROLOGIC:   [ ]No focal deficits  [ x]Cognitive impairment  [ ]Dysphagia [ ]Dysarthria [ ]Paresis [ ]Other   SKIN:   [ ]Normal  [ ]Rash  [ x]Other  [x ]Pressure ulcer(s)       Present on admission [ x]y [ ]n    CRITICAL CARE:  [ ] Shock Present  [ ]Septic [ ]Cardiogenic [ ]Neurologic [ ]Hypovolemic  [ ]  Vasopressors [ ]  Inotropes   [x ]Respiratory failure present [ x]Mechanical ventilation [ ]Non-invasive ventilatory support [ ]High flow  Mode: CPAP with PS, FiO2: 30, PEEP: 5, PS: 15, MAP: 9, PIP: 20  [ ]Acute  [x ]Chronic [ ]Hypoxic  [ ]Hypercarbic [ ]Other  [ ]Other organ failure     LABS:                        8.8    5.92  )-----------( 102      ( 04 Dec 2023 07:00 )             30.0   12-04    137  |  101  |  17  ----------------------------<  200<H>  3.4<L>   |  25  |  <0.30<L>    Ca    9.4      04 Dec 2023 06:58  Phos  4.6     12-04  Mg     1.7     12-04    TPro  7.0  /  Alb  3.3  /  TBili  0.4  /  DBili  x   /  AST  22  /  ALT  22  /  AlkPhos  50  12-03      Urinalysis Basic - ( 04 Dec 2023 06:58 )    Color: x / Appearance: x / SG: x / pH: x  Gluc: 200 mg/dL / Ketone: x  / Bili: x / Urobili: x   Blood: x / Protein: x / Nitrite: x   Leuk Esterase: x / RBC: x / WBC x   Sq Epi: x / Non Sq Epi: x / Bacteria: x      RADIOLOGY & ADDITIONAL STUDIES:      < from: CT Abdomen and Pelvis w/ IV Cont (12.04.23 @ 04:55) >  ACC: [id number] EXAM:  CT ABDOMEN AND PELVIS IC   ORDERED BY: JUNIOR FOREMAN     PROCEDURE DATE:  12/04/2023          INTERPRETATION:  CLINICAL INFORMATION: Abdominal pain.    COMPARISON: CT abdomen pelvis 10/27/2023.    CONTRAST/COMPLICATIONS:  IV Contrast: Omnipaque 350  90 cc administered   10 cc discarded  Oral Contrast: NONE  Complications: None reported at time of study completion    PROCEDURE:  CT of the Abdomen and Pelvis was performed.  Sagittal and coronal reformats were performed.    FINDINGS:  LOWER CHEST: Bibasilar consolidation. Scattered nodular opacities in the   visualized lung bases.    LIVER: Within normal limits.  BILE DUCTS: Normal caliber.  GALLBLADDER: Within normal limits.  SPLEEN: Within normal limits.  PANCREAS: Within normal limits.  ADRENALS: Within normal limits.  KIDNEYS/URETERS: Within normal limits.    BLADDER: Within normal limits.  REPRODUCTIVE ORGANS: Right adnexal cysts measuring up to 2.2 cm.    BOWEL: No bowel obstruction. Appendix is not visualized. No evidence of   inflammation in the pericecal region. Gastrostomy tube within the stomach   with mild thickening along the tract, unchanged.  PERITONEUM: No ascites.  VESSELS: Atherosclerotic changes.  RETROPERITONEUM/LYMPH NODES: No lymphadenopathy.  ABDOMINAL WALL: Soft tissue thickening in the gluteal cleft, stable to   mildly improved.  BONES: Degenerative changes. Multilevel vertebral body height loss,   unchanged.    IMPRESSION:  No acute intra-abdominal findings.    Sacral decubitus wound, stable to mildly improved.    Bibasilar lung consolidation. Scattered nodular opacities in the   visualized lung bases. CT chest can be performed for further evaluation.    --- End of Report ---          < end of copied text >    PROTEIN CALORIE MALNUTRITION PRESENT: [ ]mild [ ]moderate [ ]severe [ ]underweight [ ]morbid obesity  https://www.andeal.org/vault/2440/web/files/ONC/Table_Clinical%20Characteristics%20to%20Document%20Malnutrition-White%20JV%20et%20al%651609.pdf    Height (cm): 147.3 (10-28-23 @ 02:32)  Weight (kg): 54.6 (10-28-23 @ 02:32)  BMI (kg/m2): 25.2 (10-28-23 @ 02:32)    [ ]PPSV2 < or = to 30% [ ]significant weight loss  [ ]poor nutritional intake  [ ]anasarca[ ]Artificial Nutrition      Other REFERRALS:  [ ]Hospice  [ ]Child Life  [ x]Social Work  [ ]Case management [ ]Holistic Therapy      HPI:  72 y/o F with PMHx of T2DM, HTN, HLD, anemia, hypothyroidism, RA, fibromyalgia, remote cerebral aneurysm repair, acute hypercapnic respiratory failure now with tracheostomy, PEG tube, and bedbound (trach change 8/18, PEG change 8/14), sacral pressure ulcer, BIBEMS from home for 6 day hx of bleeding from the tracheostomy and PEG tube with associated abdominal pain. Patient still having regular bowel movements, last one occurred prior to ED arrival. Was seen outpatient on 10/26 by critical care Dr. Zaki Gottlieb and apparently had cultures sent at that time. Patient also noted to have chronic sacral decubitous ulcer. Family endorsed one episode of fever, though was not febrile on evaluation. Daughter noted patient was recently experiencing auditory and visual hallucinations (seeing animals that were not there & hearing a "bomb" going off outside her home), though patient not actively hallucinating on evaluation.    ED course notable for CT neck imaging showing no evidence of active bleeding around the tracheostomy site, no hematomas, and no drainable collection around the tracheostomy site. CT abdomen/pelvis notable for gastrostomy tube localized to the stomach with mild thickening noted along the tract; a sacral decubitus ulcer extending to the coccyx with osseous remodeling, possibly representing osteomyelitis; and bibasilar patchy opacities with volume loss in the lungs, representing atelectasis vs infection. Patient admitted for respiratory support, wound care of tracheostomy and PEG, and management of presumed sacral osteomyelitis.   (28 Oct 2023 04:18)    PERTINENT PM/SXH:   Diabetes    Rheumatoid arthritis    Fibromyalgia    Hypothyroid    Hypertension      H/O tracheostomy    PEG (percutaneous endoscopic gastrostomy) status      FAMILY HISTORY:    Family Hx substance abuse [ ]yes [ ]no  ITEMS NOT CHECKED ARE NOT PRESENT    SOCIAL HISTORY:   Significant other/partner[ ]  Children[x ]  Rastafari/Spirituality:  Substance hx:  [ ]   Tobacco hx:  [ ]   Alcohol hx: [ ]   Home Opioid hx:  [ ] I-Stop Reference No:  Living Situation: [x ]Home  [ ]Long term care  [ ]Rehab [ ]Other    ADVANCE DIRECTIVES:    DNR/MOLST  [ ]  Living Will  [ ]   DECISION MAKER(s):  [ ] Health Care Proxy(s)  [x ] Surrogate(s)  [ ] Guardian           Name(s): Phone Number(s):  Daughter Marysol,  defers to her   BASELINE (I)ADL(s) (prior to admission):  [place]: [ ]Total  [ ] Moderate [ x]Dependent    Allergies    penicillin (Unknown)  heparin (Unknown)  Tagamet (Unknown)  pineapple (Unknown)  walnut (Unknown)  Pecan, Filbert, Hazelnut (Unknown)  Lyrica (Unknown)    Intolerances    MEDICATIONS  (STANDING):  albuterol/ipratropium for Nebulization 3 milliLiter(s) Nebulizer every 6 hours  amLODIPine   Tablet 10 milliGRAM(s) Oral daily  artificial  tears Solution 2 Drop(s) Both EYES four times a day  ascorbic acid 500 milliGRAM(s) Oral daily  calcium carbonate    500 mG (Tums) Chewable 1 Tablet(s) Chew every 12 hours  cephalexin 500 milliGRAM(s) Oral every 6 hours  chlorhexidine 0.12% Liquid 15 milliLiter(s) Oral Mucosa every 12 hours  chlorhexidine 4% Liquid 1 Application(s) Topical <User Schedule>  ciprofloxacin   IVPB 400 milliGRAM(s) IV Intermittent every 12 hours  dextrose 50% Injectable 25 Gram(s) IV Push once  dextrose 50% Injectable 12.5 Gram(s) IV Push once  dextrose 50% Injectable 25 Gram(s) IV Push once  dextrose 50% Injectable 50 milliLiter(s) IV Push once  dextrose Oral Gel 15 Gram(s) Oral once  escitalopram 10 milliGRAM(s) Oral <User Schedule>  gabapentin Solution 100 milliGRAM(s) Enteral Tube at bedtime  glucagon  Injectable 1 milliGRAM(s) IntraMuscular once  hemorrhoidal Ointment      hemorrhoidal Ointment 1 Application(s) Rectal daily  insulin glargine Injectable (LANTUS) 20 Unit(s) SubCutaneous every morning  insulin lispro (ADMELOG) corrective regimen sliding scale   SubCutaneous every 6 hours  insulin regular  human recombinant 20 Unit(s) SubCutaneous <User Schedule>  insulin regular  human recombinant 11 Unit(s) SubCutaneous <User Schedule>  levothyroxine 125 MICROGram(s) Oral <User Schedule>  lidocaine   4% Patch 1 Patch Transdermal daily  melatonin 1 milliGRAM(s) Oral at bedtime  melatonin 5 milliGRAM(s) Oral at bedtime  multivitamin/minerals/iron Oral Solution (CENTRUM) 15 milliLiter(s) Oral daily  nystatin Powder 1 Application(s) Topical every 8 hours  pantoprazole   Suspension 40 milliGRAM(s) Enteral Tube <User Schedule>  petrolatum Ophthalmic Ointment 1 Application(s) Both EYES four times a day  predniSONE   Tablet 5 milliGRAM(s) Oral daily  QUEtiapine 12.5 milliGRAM(s) Oral <User Schedule>  simethicone 80 milliGRAM(s) Chew four times a day  sodium chloride 3%  Inhalation 4 milliLiter(s) Inhalation every 12 hours  zinc oxide 40% Paste 1 Application(s) Topical daily    MEDICATIONS  (PRN):  acetaminophen   Oral Liquid .. 1000 milliGRAM(s) Enteral Tube every 6 hours PRN Temp greater or equal to 38C (100.4F), Mild Pain (1 - 3)  benzocaine 20% Spray 1 Spray(s) Topical four times a day PRN Cough  diclofenac sodium 1% Gel 2 Gram(s) Topical four times a day PRN pain  diphenhydrAMINE Elixir 25 milliGRAM(s) Oral every 6 hours PRN Rash and/or Itching  guaifenesin/dextromethorphan Oral Liquid 10 milliLiter(s) Oral every 6 hours PRN Cough  oxyCODONE    Solution 2.5 milliGRAM(s) Oral every 6 hours PRN Moderate Pain (4 - 6)  sodium chloride 0.65% Nasal 1 Spray(s) Both Nostrils every 6 hours PRN Nasal Congestion    PRESENT SYMPTOMS: [x ]Unable to self-report  [ ] CPOT [ ] PAINADs [ ] RDOS  Source if other than patient:  [ ]Family   [ ]Team     Pain: [ ]yes [ ]no  QOL impact -   Location -                    Aggravating factors -  Quality -  Radiation -  Timing-  Severity (0-10 scale):  Minimal acceptable level (0-10 scale):     CPOT:  0  https://www.sccm.org/getattachment/rlh14o94-4g6b-4u9u-6c7q-6298z7082n7g/Critical-Care-Pain-Observation-Tool-(CPOT)    PAIN AD Score: 0  [email or web address]/[email or web address] (press ctrl +  left click to view)    Dyspnea:                           [ ]Mild [ ]Moderate [ ]Severe      RDOS:  0 to 2  minimal or no respiratory distress 0  3  mild distress  4 to 6 moderate distress  >7 severe distress  [email or web address]_Distress_Observation_Scale.pdf (Ctrl +  left click to view)     Anxiety:                             [ x]Mild [ ]Moderate [ ]Severe  Fatigue:                             [ x]Mild [ ]Moderate [ ]Severe  Nausea:                             [ ]Mild [ ]Moderate [ ]Severe  Loss of appetite:              [ ]Mild [ ]Moderate [ ]Severe  Constipation:                    [ ]Mild [ ]Moderate [ ]Severe    PCSSQ [Palliative Care Spiritual Screening Question]   Severity (0-10):5  Score of 4 or > indicate consideration of Chaplaincy referral.  Chaplaincy Referral: [ ] yes [ ] refused [ ] following  xx deferred     Caregiver [place]? : [ x] yes [ ] no  Social work referral [ ] Patient & Family Centered Care Referral [ ]     Anticipatory Grief Present?: [ x] yes [ ] no  Social work referral [ ]  Patient & Family Centered Care Referral [ ]       Other Symptoms:  [ x]All other review of systems negative     Palliative Performance Status Version 2:   10      %    [email or web address]  PHYSICAL EXAM:  Vital Signs Last 24 Hrs  T(C): 36.5 (04 Dec 2023 05:00), Max: 38 (03 Dec 2023 20:57)  T(F): 97.7 (04 Dec 2023 05:00), Max: 100.4 (03 Dec 2023 20:57)  HR: 83 (04 Dec 2023 12:05) (74 - 100)  BP: 146/54 (04 Dec 2023 05:00) (118/54 - 146/54)  BP(mean): --  RR: 17 (04 Dec 2023 05:00) (16 - 20)  SpO2: 100% (04 Dec 2023 12:05) (95% - 100%)    Parameters below as of 04 Dec 2023 12:05  Patient On (Oxygen Delivery Method): ventilator     I&O's Summary    03 Dec 2023 07:01  -  04 Dec 2023 07:00  --------------------------------------------------------  IN: 1870 mL / OUT: 1400 mL / NET: 470 mL    04 Dec 2023 07:01  -  04 Dec 2023 14:22  --------------------------------------------------------  IN: 0 mL / OUT: 800 mL / NET: -800 mL      GENERAL: [ ]Cachexia    [ x]Alert  [ ]Oriented x   [ ]Lethargic  [ ]Unarousable  [ ]Verbal  [ x]Non-Verbal  Behavioral:   [ ] Anxiety  [ ] Delirium [ ] Agitation [x ] Other  HEENT:  [ ]Normal   [ ]Dry mouth   [x ]ET Tube/Trach  [ ]Oral lesions  PULMONARY:   [ ]Clear [ ]Tachypnea  [ ]Audible excessive secretions   [ ]Rhonchi        [ ]Right [ ]Left [ ]Bilateral  [x ]Crackles        [ ]Right [ ]Left [ x]Bilateral  [ ]Wheezing     [ ]Right [ ]Left [ ]Bilateral  [ ]Diminished breath sounds [ ]right [ ]left [ ]bilateral  CARDIOVASCULAR:    [x ]Regular [ ]Irregular [ ]Tachy  [ ]Red [ ]Murmur [ ]Other  GASTROINTESTINAL:  [ x]Soft  [ ]Distended   [x ]+BS  [ ]Non tender [ ]Tender  [ ]Other [ ]PEG [ ]OGT/ NGT  Last BM:  GENITOURINARY:  [ ]Normal [ x] Incontinent   [ ]Oliguria/Anuria   [ ]Mcbride  MUSCULOSKELETAL:   [ ]Normal   [ ]Weakness  [ x]Bed/Wheelchair bound [ ]Edema  NEUROLOGIC:   [ ]No focal deficits  [ x]Cognitive impairment  [ ]Dysphagia [ ]Dysarthria [ ]Paresis [ ]Other   SKIN:   [ ]Normal  [ ]Rash  [ x]Other  [x ]Pressure ulcer(s)       Present on admission [ x]y [ ]n    CRITICAL CARE:  [ ] Shock Present  [ ]Septic [ ]Cardiogenic [ ]Neurologic [ ]Hypovolemic  [ ]  Vasopressors [ ]  Inotropes   [x ]Respiratory failure present [ x]Mechanical ventilation [ ]Non-invasive ventilatory support [ ]High flow  Mode: CPAP with PS, FiO2: 30, PEEP: 5, PS: 15, MAP: 9, PIP: 20  [ ]Acute  [x ]Chronic [ ]Hypoxic  [ ]Hypercarbic [ ]Other  [ ]Other organ failure     LABS:                        8.8    5.92  )-----------( 102      ( 04 Dec 2023 07:00 )             30.0   12-04    137  |  101  |  17  ----------------------------<  200<H>  3.4<L>   |  25  |  <0.30<L>    Ca    9.4      04 Dec 2023 06:58  Phos  4.6     12-04  Mg     1.7     12-04    TPro  7.0  /  Alb  3.3  /  TBili  0.4  /  DBili  x   /  AST  22  /  ALT  22  /  AlkPhos  50  12-03      Urinalysis Basic - ( 04 Dec 2023 06:58 )    Color: x / Appearance: x / SG: x / pH: x  Gluc: 200 mg/dL / Ketone: x  / Bili: x / Urobili: x   Blood: x / Protein: x / Nitrite: x   Leuk Esterase: x / RBC: x / WBC x   Sq Epi: x / Non Sq Epi: x / Bacteria: x      RADIOLOGY & ADDITIONAL STUDIES:      < from: CT Abdomen and Pelvis w/ IV Cont (12.04.23 @ 04:55) >  ACC: [id number] EXAM:  CT ABDOMEN AND PELVIS IC   ORDERED BY: JUNIOR FOREMAN     PROCEDURE DATE:  12/04/2023          INTERPRETATION:  CLINICAL INFORMATION: Abdominal pain.    COMPARISON: CT abdomen pelvis 10/27/2023.    CONTRAST/COMPLICATIONS:  IV Contrast: Omnipaque 350  90 cc administered   10 cc discarded  Oral Contrast: NONE  Complications: None reported at time of study completion    PROCEDURE:  CT of the Abdomen and Pelvis was performed.  Sagittal and coronal reformats were performed.    FINDINGS:  LOWER CHEST: Bibasilar consolidation. Scattered nodular opacities in the   visualized lung bases.    LIVER: Within normal limits.  BILE DUCTS: Normal caliber.  GALLBLADDER: Within normal limits.  SPLEEN: Within normal limits.  PANCREAS: Within normal limits.  ADRENALS: Within normal limits.  KIDNEYS/URETERS: Within normal limits.    BLADDER: Within normal limits.  REPRODUCTIVE ORGANS: Right adnexal cysts measuring up to 2.2 cm.    BOWEL: No bowel obstruction. Appendix is not visualized. No evidence of   inflammation in the pericecal region. Gastrostomy tube within the stomach   with mild thickening along the tract, unchanged.  PERITONEUM: No ascites.  VESSELS: Atherosclerotic changes.  RETROPERITONEUM/LYMPH NODES: No lymphadenopathy.  ABDOMINAL WALL: Soft tissue thickening in the gluteal cleft, stable to   mildly improved.  BONES: Degenerative changes. Multilevel vertebral body height loss,   unchanged.    IMPRESSION:  No acute intra-abdominal findings.    Sacral decubitus wound, stable to mildly improved.    Bibasilar lung consolidation. Scattered nodular opacities in the   visualized lung bases. CT chest can be performed for further evaluation.    --- End of Report ---          < end of copied text >    PROTEIN CALORIE MALNUTRITION PRESENT: [ ]mild [ ]moderate [ ]severe [ ]underweight [ ]morbid obesity  https://www.andeal.org/vault/2440/web/files/[email or web address]_Clinical%20Characteristics%20to%[email or web address]-White%[email or web address]%20et%[email or web address]    Height (cm): 147.3 (10-28-23 @ 02:32)  Weight (kg): 54.6 (10-28-23 @ 02:32)  BMI (kg/m2): 25.2 (10-28-23 @ 02:32)    [ ]PPSV2 < or = to 30% [ ]significant weight loss  [ ]poor nutritional intake  [ ]anasarca[ ]Artificial Nutrition      Other REFERRALS:  [ ]Hospice  [ ]Child Life  [ x]Social Work  [ ]Case management [ ]Holistic Therapy

## 2023-12-04 NOTE — PROGRESS NOTE ADULT - PROBLEM SELECTOR PLAN 2
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   - S/p CT abd/pelvis (10/28): Sacral decubitus ulcer extending to the coccyx with osseous remodeling and pelvic MRI or bone scan to recc to exclude osteomyelitis.  - 10/30 wound care note: Sacral stage 4 pressure injury 4.5cm x 2.5cm x 1.5cm w/ lip of undermining circumferentially greatest 9-12 of 3cm.  - MRI pelvis 10/30 with Osteomyelitis, c/w current Cefepime for 7 days, Vancomycin d/marli   - Elevated, ESR 85   - Elevated,    - Wound care on board  - 11/10: resumed Cipro 500mg q 12 and Keflex 500mg q 6 until 12/10.  - 11/20: Changed to IV Cipro 400 q12 as recommended by ID pharmacist due to multiple drug interactions when given via PEG  - 11/28 wound care note: Sacral stage 4pressure injury 4.5cm x 2.5cm x 0.5cm, moist granular wound bed   palpable but not exposed bone no blistering, (+) serosanguinous drainage. No odor, erythema, increased warmth, tenderness, induration, fluctuance, nor crepitus.  - (12/3) Wound cx collected on 11/29, has been cancelled, unsure why. Plan to follow up with Wound care team to recollect new specimen

## 2023-12-04 NOTE — CONSULT NOTE ADULT - PROBLEM SELECTOR RECOMMENDATION 3
Chronic history, used to be on meds in the past. FU Clinically
Chronic trach/vent dependent 2/2 hypercapnic resp failure sine 2022  Continue management per [facility]CU

## 2023-12-04 NOTE — PROGRESS NOTE ADULT - SUBJECTIVE AND OBJECTIVE BOX
Patient is a 73y old  Female who presents with a chief complaint of peg tube   DATE OF SERVICE: 12/4/2023      SUBJECTIVE / OVERNIGHT EVENTS: Afebrile.  Afebrile      MEDICATIONS  (STANDING):  albuterol/ipratropium for Nebulization 3 milliLiter(s) Nebulizer every 6 hours  artificial  tears Solution 2 Drop(s) Both EYES four times a day  ascorbic acid 500 milliGRAM(s) Oral daily  bacitracin   Ointment 1 Application(s) Topical two times a day  bisacodyl Suppository 10 milliGRAM(s) Rectal once  calcium carbonate    500 mG (Tums) Chewable 1 Tablet(s) Chew every 12 hours  cephalexin 500 milliGRAM(s) Oral every 6 hours  chlorhexidine 0.12% Liquid 15 milliLiter(s) Oral Mucosa every 12 hours  chlorhexidine 4% Liquid 1 Application(s) Topical <User Schedule>  ciprofloxacin     Tablet 500 milliGRAM(s) Oral every 12 hours  dextrose 50% Injectable 50 milliLiter(s) IV Push once  enoxaparin Injectable 40 milliGRAM(s) SubCutaneous every 24 hours  escitalopram 10 milliGRAM(s) Oral daily  fentaNYL   Patch  25 MICROgram(s)/Hr 1 Patch Transdermal every 72 hours  gabapentin Solution 100 milliGRAM(s) Enteral Tube at bedtime  insulin glargine Injectable (LANTUS) 14 Unit(s) SubCutaneous every morning  insulin lispro (ADMELOG) corrective regimen sliding scale   SubCutaneous every 6 hours  levothyroxine 125 MICROGram(s) Oral <User Schedule>  lidocaine   4% Patch 1 Patch Transdermal daily  melatonin 3 milliGRAM(s) Oral at bedtime  multivitamin/minerals/iron Oral Solution (CENTRUM) 15 milliLiter(s) Oral daily  nystatin Powder 1 Application(s) Topical every 8 hours  oxybutynin 5 milliGRAM(s) Oral daily  pantoprazole   Suspension 40 milliGRAM(s) Enteral Tube daily  petrolatum Ophthalmic Ointment 1 Application(s) Both EYES four times a day  predniSONE   Tablet 5 milliGRAM(s) Oral daily  QUEtiapine 12.5 milliGRAM(s) Oral <User Schedule>  QUEtiapine 12.5 milliGRAM(s) Oral <User Schedule>  senna Syrup 10 milliLiter(s) Oral at bedtime  simethicone 80 milliGRAM(s) Chew four times a day  sodium chloride 3%  Inhalation 4 milliLiter(s) Inhalation every 12 hours  triamcinolone 0.1% Ointment 1 Application(s) Topical two times a day  zinc oxide 40% Paste 1 Application(s) Topical daily  zinc sulfate 220 milliGRAM(s) Oral daily    MEDICATIONS  (PRN):  acetaminophen   Oral Liquid .. 1000 milliGRAM(s) Enteral Tube every 6 hours PRN Temp greater or equal to 38C (100.4F), Mild Pain (1 - 3)  benzocaine 20% Spray 1 Spray(s) Topical four times a day PRN Cough  diphenhydrAMINE Elixir 25 milliGRAM(s) Oral every 6 hours PRN Rash and/or Itching  oxyCODONE    Solution 2.5 milliGRAM(s) Oral every 6 hours PRN Moderate Pain (4 - 6)  sodium chloride 0.65% Nasal 1 Spray(s) Both Nostrils every 6 hours PRN Nasal Congestion          I&O's Summary    17 Nov 2023 07:01  -  18 Nov 2023 07:00  --------------------------------------------------------  IN: 1300 mL / OUT: 950 mL / NET: 350 mL    18 Nov 2023 07:01  -  18 Nov 2023 17:32  --------------------------------------------------------  IN: 0 mL / OUT: 400 mL / NET: -400 mL        PHYSICAL EXAM:  Vital Signs Last 24 Hrs  T(C): 36.5 (04 Dec 2023 05:00), Max: 38 (03 Dec 2023 20:57)  T(F): 97.7 (04 Dec 2023 05:00), Max: 100.4 (03 Dec 2023 20:57)  HR: 83 (04 Dec 2023 12:05) (74 - 100)  BP: 146/54 (04 Dec 2023 05:00) (116/63 - 146/54)  BP(mean): --  RR: 17 (04 Dec 2023 05:00) (16 - 20)  SpO2: 100% (04 Dec 2023 12:05) (95% - 100%)    Parameters below as of 04 Dec 2023 12:05  Patient On (Oxygen Delivery Method): ventilator          GENERAL: NAD, well-developed  HEAD:  Atraumatic, Normocephalic  EYES: EOMI, PERRLA, conjunctiva and sclera clear  NECK: Supple, No JVD. On [facility] vent with a tracheostomy  CHEST/LUNG: Clear to auscultation bilaterally; No wheeze  HEART: Regular rate and rhythm; No murmurs, rubs, or gallops  ABDOMEN: Soft, Nontender, Nondistended; Bowel sounds present  EXTREMITIES:  2+ Peripheral Pulses, No clubbing, cyanosis, or edema  PSYCH: AAOx3  NEUROLOGY: non-focal. Functionally quadriplegic  SKIN: Sacral decubitus dressed    LABS:                                 9.5    8.05  )-----------( 130      ( 28 Nov 2023 06:36 )             32.2                7.5    7.48  )-----------( 134      ( 26 Nov 2023 07:27 )             25.6                                    8.6    8.89  )-----------( 126      ( 21 Nov 2023 07:05 )             29.1                8.9    9.19  )-----------( 124      ( 18 Nov 2023 09:15 )             30.4     11-18    137  |  99  |  29<H>  ----------------------------<  224<H>  4.1   |  28  |  <0.30<L>    Ca    10.0      18 Nov 2023 09:15  Mg     1.8     11-18    TPro  7.4  /  Alb  3.3  /  TBili  0.3  /  DBili  x   /  AST  19  /  ALT  18  /  AlkPhos  48  11-18          Urinalysis Basic - ( 18 Nov 2023 09:15 )    Color: x / Appearance: x / SG: x / pH: x  Gluc: 224 mg/dL / Ketone: x  / Bili: x / Urobili: x   Blood: x / Protein: x / Nitrite: x   Leuk Esterase: x / RBC: x / WBC x   Sq Epi: x / Non Sq Epi: x / Bacteria: x        RADIOLOGY & ADDITIONAL TESTS:    Imaging Personally Reviewed:    Consultant(s) Notes Reviewed:      Care Discussed with Consultants/Other Providers:   Patient is a 73y old  Female who presents with a chief complaint of peg tube   DATE OF SERVICE: 12/4/2023      SUBJECTIVE / OVERNIGHT EVENTS: Afebrile.  Afebrile      MEDICATIONS  (STANDING):  albuterol/ipratropium for Nebulization 3 milliLiter(s) Nebulizer every 6 hours  artificial  tears Solution 2 Drop(s) Both EYES four times a day  ascorbic acid 500 milliGRAM(s) Oral daily  bacitracin   Ointment 1 Application(s) Topical two times a day  bisacodyl Suppository 10 milliGRAM(s) Rectal once  calcium carbonate    500 mG (Tums) Chewable 1 Tablet(s) Chew every 12 hours  cephalexin 500 milliGRAM(s) Oral every 6 hours  chlorhexidine 0.12% Liquid 15 milliLiter(s) Oral Mucosa every 12 hours  chlorhexidine 4% Liquid 1 Application(s) Topical <User Schedule>  ciprofloxacin     Tablet 500 milliGRAM(s) Oral every 12 hours  dextrose 50% Injectable 50 milliLiter(s) IV Push once  enoxaparin Injectable 40 milliGRAM(s) SubCutaneous every 24 hours  escitalopram 10 milliGRAM(s) Oral daily  fentaNYL   Patch  25 MICROgram(s)/Hr 1 Patch Transdermal every 72 hours  gabapentin Solution 100 milliGRAM(s) Enteral Tube at bedtime  insulin glargine Injectable (LANTUS) 14 Unit(s) SubCutaneous every morning  insulin lispro (ADMELOG) corrective regimen sliding scale   SubCutaneous every 6 hours  levothyroxine 125 MICROGram(s) Oral <User Schedule>  lidocaine   4% Patch 1 Patch Transdermal daily  melatonin 3 milliGRAM(s) Oral at bedtime  multivitamin/minerals/iron Oral Solution (CENTRUM) 15 milliLiter(s) Oral daily  nystatin Powder 1 Application(s) Topical every 8 hours  oxybutynin 5 milliGRAM(s) Oral daily  pantoprazole   Suspension 40 milliGRAM(s) Enteral Tube daily  petrolatum Ophthalmic Ointment 1 Application(s) Both EYES four times a day  predniSONE   Tablet 5 milliGRAM(s) Oral daily  QUEtiapine 12.5 milliGRAM(s) Oral <User Schedule>  QUEtiapine 12.5 milliGRAM(s) Oral <User Schedule>  senna Syrup 10 milliLiter(s) Oral at bedtime  simethicone 80 milliGRAM(s) Chew four times a day  sodium chloride 3%  Inhalation 4 milliLiter(s) Inhalation every 12 hours  triamcinolone 0.1% Ointment 1 Application(s) Topical two times a day  zinc oxide 40% Paste 1 Application(s) Topical daily  zinc sulfate 220 milliGRAM(s) Oral daily    MEDICATIONS  (PRN):  acetaminophen   Oral Liquid .. 1000 milliGRAM(s) Enteral Tube every 6 hours PRN Temp greater or equal to 38C (100.4F), Mild Pain (1 - 3)  benzocaine 20% Spray 1 Spray(s) Topical four times a day PRN Cough  diphenhydrAMINE Elixir 25 milliGRAM(s) Oral every 6 hours PRN Rash and/or Itching  oxyCODONE    Solution 2.5 milliGRAM(s) Oral every 6 hours PRN Moderate Pain (4 - 6)  sodium chloride 0.65% Nasal 1 Spray(s) Both Nostrils every 6 hours PRN Nasal Congestion          I&O's Summary    17 Nov 2023 07:01  -  18 Nov 2023 07:00  --------------------------------------------------------  IN: 1300 mL / OUT: 950 mL / NET: 350 mL    18 Nov 2023 07:01  -  18 Nov 2023 17:32  --------------------------------------------------------  IN: 0 mL / OUT: 400 mL / NET: -400 mL        PHYSICAL EXAM:  Vital Signs Last 24 Hrs  T(C): 36.5 (04 Dec 2023 05:00), Max: 38 (03 Dec 2023 20:57)  T(F): 97.7 (04 Dec 2023 05:00), Max: 100.4 (03 Dec 2023 20:57)  HR: 83 (04 Dec 2023 12:05) (74 - 100)  BP: 146/54 (04 Dec 2023 05:00) (116/63 - 146/54)  BP(mean): --  RR: 17 (04 Dec 2023 05:00) (16 - 20)  SpO2: 100% (04 Dec 2023 12:05) (95% - 100%)    Parameters below as of 04 Dec 2023 12:05  Patient On (Oxygen Delivery Method): ventilator          GENERAL: NAD, well-developed  HEAD:  Atraumatic, Normocephalic  EYES: EOMI, PERRLA, conjunctiva and sclera clear  NECK: Supple, No JVD. On [facility] vent with a tracheostomy  CHEST/LUNG: Clear to auscultation bilaterally; No wheeze  HEART: Regular rate and rhythm; No murmurs, rubs, or gallops  ABDOMEN: Soft, Nontender, Nondistended; Bowel sounds present  EXTREMITIES:  2+ Peripheral Pulses, No clubbing, cyanosis, or edema  PSYCH: AAOx3  NEUROLOGY: non-focal. Functionally quadriplegic  SKIN: Sacral decubitus dressed    LABS:                                 9.5    8.05  )-----------( 130      ( 28 Nov 2023 06:36 )             32.2                7.5    7.48  )-----------( 134      ( 26 Nov 2023 07:27 )             25.6                                    8.6    8.89  )-----------( 126      ( 21 Nov 2023 07:05 )             29.1                8.9    9.19  )-----------( 124      ( 18 Nov 2023 09:15 )             30.4     11-18    137  |  99  |  29<H>  ----------------------------<  224<H>  4.1   |  28  |  <0.30<L>    Ca    10.0      18 Nov 2023 09:15  Mg     1.8     11-18    TPro  7.4  /  Alb  3.3  /  TBili  0.3  /  DBili  x   /  AST  19  /  ALT  18  /  AlkPhos  48  11-18          Urinalysis Basic - ( 18 Nov 2023 09:15 )    Color: x / Appearance: x / SG: x / pH: x  Gluc: 224 mg/dL / Ketone: x  / Bili: x / Urobili: x   Blood: x / Protein: x / Nitrite: x   Leuk Esterase: x / RBC: x / WBC x   Sq Epi: x / Non Sq Epi: x / Bacteria: x        RADIOLOGY & ADDITIONAL TESTS:    Imaging Personally Reviewed:    Consultant(s) Notes Reviewed:      Care Discussed with Consultants/Other Providers:

## 2023-12-04 NOTE — PROGRESS NOTE ADULT - NUTRITIONAL ASSESSMENT
Diet, NPO with Tube Feed:   Tube Feeding Modality: Gastrostomy  Casie Frazier glucose support 1.2  Total Volume for 24 Hours (mL): 1200  Continuous  Starting Tube Feed Rate {mL per Hour}: 60  Increase Tube Feed Rate by (mL): 0  Until Goal Tube Feed Rate (mL per Hour): 60  Tube Feed Duration (in Hours): 20  Tube Feed Start Time: 06:00  Tube Feed Stop Time: 02:00  Free Water Flush  Pump   Rate (mL per Hour): 10   Frequency: Every 2 Hours  Alfred(7 Gm Arginine/7 Gm Glut/1.2 Gm HMB     Qty per Day:  2 (11-29-23 @ 14:12) [Active]      Please see RD assessment and/or follow up.  Managed by primary team as well

## 2023-12-04 NOTE — CONSULT NOTE ADULT - PROBLEM SELECTOR RECOMMENDATION 2
Possible bacterial PNA  Possilble OM Sacrum  Chest CT c/w bilateral pna vs atelectasis  s/P vanco Aztreonam  Blood cx neg 11/12  Sputum Cx MDR pseudomonas clinically staple, defer tx unless symptomatic per ID   Urine Cx enterococcus no need to treat per ID

## 2023-12-04 NOTE — PROGRESS NOTE ADULT - ASSESSMENT
74 y/o F with PMHx of T2DM, HTN, HLD, anemia, hypothyroidism, RA, fibromyalgia, remote cerebral aneurysm repair, acute hypercapnic respiratory failure now with tracheostomy, PEG tube, and bedbound (trach change 8/18, PEG change 8/14), sacral pressure ulcer, BIBEMS from home for 6 day hx of bleeding from the tracheostomy and PEG tube with associated abdominal pain, recent history of auditory and visual hallucinations, and with chronic sacral decubitus ulcer. Exam notable for mild abdominal distention with LLQ and RLQ tenderness, tracheostomy in place with no obvious bleeding, PEG tube with scant amount of dried blood, at baseline neurologic status. Imaging showing no active bleeding around tracheostomy site, however was notable for mild thickening along PEG tube tract, sacral ulcer extending to the coccyx suggestive of possible osteomyelitis, and bibasilar patchy lung opacities with volume loss. Patient admitted for respiratory support, wound care of tracheostomy and PEG, and management of sacral osteomyelitis. No further Trach and peg site  bleeding noted since admission.  Pelvic MRI imaging also suggestive of Acute OM. Patient placed on long-term antibiotics with Infectious disease guidance.  Additionally treated with a 7d course of Cefepime due to PSA and Serratia tracheitis on 11/8-->11/15 and then resumed cipro/keflex.  Hospital course c/b Delirium for which Seroquel was added and home Lexapro continued. Tracheostomy changed to #9 Cuffed [place] by ENT 11/3.  Despite trach change patient remained with persistent air leak.  On 11/19, the trach was again changed due to leak and loss of volumes on vent.  Trach was changed to #8 distal cuffed [place].  Patient seen by Dermatology for back lesions.  One diagnosed as a seborrheic keratitis and the other a dermatofibroma.       12/2: s/p abdominal ultrasound - limited study, no definite sonographic evidence of acute cholecystitis.  Daughter concerned pt is more sleepy - on exam, pt appears at her baseline, intermittently resting but easily arousable and follows commands.  12/3: No new nursing issues. Pt is still reporting abd pain, and daughter is concerned and would like a scan. no fever, and leukocytosis/  wound cx collected by wound care team on 11/29 has been cancelled, will follow up wound care to recollect specimen. CT A&P w IV contrast pending    72 y/o F with PMHx of T2DM, HTN, HLD, anemia, hypothyroidism, RA, fibromyalgia, remote cerebral aneurysm repair, acute hypercapnic respiratory failure now with tracheostomy, PEG tube, and bedbound (trach change 8/18, PEG change 8/14), sacral pressure ulcer, BIBEMS from home for 6 day hx of bleeding from the tracheostomy and PEG tube with associated abdominal pain, recent history of auditory and visual hallucinations, and with chronic sacral decubitus ulcer. Exam notable for mild abdominal distention with LLQ and RLQ tenderness, tracheostomy in place with no obvious bleeding, PEG tube with scant amount of dried blood, at baseline neurologic status. Imaging showing no active bleeding around tracheostomy site, however was notable for mild thickening along PEG tube tract, sacral ulcer extending to the coccyx suggestive of possible osteomyelitis, and bibasilar patchy lung opacities with volume loss. Patient admitted for respiratory support, wound care of tracheostomy and PEG, and management of sacral osteomyelitis. No further Trach and peg site  bleeding noted since admission.  Pelvic MRI imaging also suggestive of Acute OM. Patient placed on long-term antibiotics with Infectious disease guidance.  Additionally treated with a 7d course of Cefepime due to PSA and Serratia tracheitis on 11/8-->11/15 and then resumed cipro/keflex.  Hospital course c/b Delirium for which Seroquel was added and home Lexapro continued. Tracheostomy changed to #9 Cuffed [place] by ENT 11/3.  Despite trach change patient remained with persistent air leak.  On 11/19, the trach was again changed due to leak and loss of volumes on vent.  Trach was changed to #8 distal cuffed [place].  Patient seen by Dermatology for back lesions.  One diagnosed as a seborrheic keratitis and the other a dermatofibroma.       12/2: s/p abdominal ultrasound - limited study, no definite sonographic evidence of acute cholecystitis.  Daughter concerned pt is more sleepy - on exam, pt appears at her baseline, intermittently resting but easily arousable and follows commands.  12/3: No new nursing issues. Pt is still reporting abd pain, and daughter is concerned and would like a scan. no fever, and leukocytosis/  wound cx collected by wound care team on 11/29 has been cancelled, will follow up wound care to recollect specimen. CT A&P w IV contrast pending    74 y/o F with PMHx of T2DM, HTN, HLD, anemia, hypothyroidism, RA, fibromyalgia, remote cerebral aneurysm repair, acute hypercapnic respiratory failure now with tracheostomy, PEG tube, and bedbound (trach change 8/18, PEG change 8/14), sacral pressure ulcer, BIBEMS from home for 6 day hx of bleeding from the tracheostomy and PEG tube with associated abdominal pain, recent history of auditory and visual hallucinations, and with chronic sacral decubitus ulcer. Exam notable for mild abdominal distention with LLQ and RLQ tenderness, tracheostomy in place with no obvious bleeding, PEG tube with scant amount of dried blood, at baseline neurologic status. Imaging showing no active bleeding around tracheostomy site, however was notable for mild thickening along PEG tube tract, sacral ulcer extending to the coccyx suggestive of possible osteomyelitis, and bibasilar patchy lung opacities with volume loss. Patient admitted for respiratory support, wound care of tracheostomy and PEG, and management of sacral osteomyelitis. No further Trach and peg site  bleeding noted since admission.  Pelvic MRI imaging also suggestive of Acute OM. Patient placed on long-term antibiotics with Infectious disease guidance.  Additionally treated with a 7d course of Cefepime due to PSA and Serratia tracheitis on 11/8-->11/15 and then resumed cipro/keflex.  Hospital course c/b Delirium for which Seroquel was added and home Lexapro continued. Tracheostomy changed to #9 Cuffed [place] by ENT 11/3.  Despite trach change patient remained with persistent air leak.  On 11/19, the trach was again changed due to leak and loss of volumes on vent.  Trach was changed to #8 distal cuffed [place].  Patient seen by Dermatology for back lesions.  One diagnosed as a seborrheic keratitis and the other a dermatofibroma.       12/4: Bladder scans q8hr w/ straight cath as needed.  Ditropan d/c'd for retention.  Daughter and pts  at bedside and updated in plan of care.   74 y/o F with PMHx of T2DM, HTN, HLD, anemia, hypothyroidism, RA, fibromyalgia, remote cerebral aneurysm repair, acute hypercapnic respiratory failure now with tracheostomy, PEG tube, and bedbound (trach change 8/18, PEG change 8/14), sacral pressure ulcer, BIBEMS from home for 6 day hx of bleeding from the tracheostomy and PEG tube with associated abdominal pain, recent history of auditory and visual hallucinations, and with chronic sacral decubitus ulcer. Exam notable for mild abdominal distention with LLQ and RLQ tenderness, tracheostomy in place with no obvious bleeding, PEG tube with scant amount of dried blood, at baseline neurologic status. Imaging showing no active bleeding around tracheostomy site, however was notable for mild thickening along PEG tube tract, sacral ulcer extending to the coccyx suggestive of possible osteomyelitis, and bibasilar patchy lung opacities with volume loss. Patient admitted for respiratory support, wound care of tracheostomy and PEG, and management of sacral osteomyelitis. No further Trach and peg site  bleeding noted since admission.  Pelvic MRI imaging also suggestive of Acute OM. Patient placed on long-term antibiotics with Infectious disease guidance.  Additionally treated with a 7d course of Cefepime due to PSA and Serratia tracheitis on 11/8-->11/15 and then resumed cipro/keflex.  Hospital course c/b Delirium for which Seroquel was added and home Lexapro continued. Tracheostomy changed to #9 Cuffed [place] by ENT 11/3.  Despite trach change patient remained with persistent air leak.  On 11/19, the trach was again changed due to leak and loss of volumes on vent.  Trach was changed to #8 distal cuffed [place].  Patient seen by Dermatology for back lesions.  One diagnosed as a seborrheic keratitis and the other a dermatofibroma.       12/4: S/p CT A/P for abdominal pain - no acute intra-abdominal findings.  Bladder scans q8hr w/ straight cath as needed.  Ditropan d/c'd for retention.  Daughter and pts  at bedside and updated in plan of care.   72 y/o F with PMHx of T2DM, HTN, HLD, anemia, hypothyroidism, RA, fibromyalgia, remote cerebral aneurysm repair, acute hypercapnic respiratory failure now with tracheostomy, PEG tube, and bedbound (trach change 8/18, PEG change 8/14), sacral pressure ulcer, BIBEMS from home for 6 day hx of bleeding from the tracheostomy and PEG tube with associated abdominal pain, recent history of auditory and visual hallucinations, and with chronic sacral decubitus ulcer. Exam notable for mild abdominal distention with LLQ and RLQ tenderness, tracheostomy in place with no obvious bleeding, PEG tube with scant amount of dried blood, at baseline neurologic status. Imaging showing no active bleeding around tracheostomy site, however was notable for mild thickening along PEG tube tract, sacral ulcer extending to the coccyx suggestive of possible osteomyelitis, and bibasilar patchy lung opacities with volume loss. Patient admitted for respiratory support, wound care of tracheostomy and PEG, and management of sacral osteomyelitis. No further Trach and peg site  bleeding noted since admission.  Pelvic MRI imaging also suggestive of Acute OM. Patient placed on long-term antibiotics with Infectious disease guidance.  Additionally treated with a 7d course of Cefepime due to PSA and Serratia tracheitis on 11/8-->11/15 and then resumed cipro/keflex.  Hospital course c/b Delirium for which Seroquel was added and home Lexapro continued. Tracheostomy changed to #9 Cuffed [place] by ENT 11/3.  Despite trach change patient remained with persistent air leak.  On 11/19, the trach was again changed due to leak and loss of volumes on vent.  Trach was changed to #8 distal cuffed [place].  Patient seen by Dermatology for back lesions.  One diagnosed as a seborrheic keratitis and the other a dermatofibroma.       12/4: S/p CT A/P for abdominal pain - no acute intra-abdominal findings.  Bladder scans q8hr w/ straight cath as needed.  Ditropan d/c'd for retention.  Daughter and pts  at bedside and updated in plan of care.

## 2023-12-04 NOTE — CONSULT NOTE ADULT - PROBLEM SELECTOR RECOMMENDATION 4
Sacral ulcer extending to the coccyx per CT abd/pelvis  with osseous remodeling   MRI with OM c/w curent cefepime x 7 days   Wound care
Coverage as per sliding scale

## 2023-12-05 LAB
ANION GAP SERPL CALC-SCNC: 12 MMOL/L — SIGNIFICANT CHANGE UP (ref 5–17)
ANION GAP SERPL CALC-SCNC: 12 MMOL/L — SIGNIFICANT CHANGE UP (ref 5–17)
BUN SERPL-MCNC: 16 MG/DL — SIGNIFICANT CHANGE UP (ref 7–23)
BUN SERPL-MCNC: 16 MG/DL — SIGNIFICANT CHANGE UP (ref 7–23)
CALCIUM SERPL-MCNC: 9.8 MG/DL — SIGNIFICANT CHANGE UP (ref 8.4–10.5)
CALCIUM SERPL-MCNC: 9.8 MG/DL — SIGNIFICANT CHANGE UP (ref 8.4–10.5)
CHLORIDE SERPL-SCNC: 99 MMOL/L — SIGNIFICANT CHANGE UP (ref 96–108)
CHLORIDE SERPL-SCNC: 99 MMOL/L — SIGNIFICANT CHANGE UP (ref 96–108)
CO2 SERPL-SCNC: 26 MMOL/L — SIGNIFICANT CHANGE UP (ref 22–31)
CO2 SERPL-SCNC: 26 MMOL/L — SIGNIFICANT CHANGE UP (ref 22–31)
CREAT SERPL-MCNC: <0.3 MG/DL — LOW (ref 0.5–1.3)
CREAT SERPL-MCNC: <0.3 MG/DL — LOW (ref 0.5–1.3)
EGFR: 113 ML/MIN/1.73M2 — SIGNIFICANT CHANGE UP
EGFR: 113 ML/MIN/1.73M2 — SIGNIFICANT CHANGE UP
GLUCOSE BLDC GLUCOMTR-MCNC: 136 MG/DL — HIGH (ref 70–99)
GLUCOSE BLDC GLUCOMTR-MCNC: 136 MG/DL — HIGH (ref 70–99)
GLUCOSE BLDC GLUCOMTR-MCNC: 153 MG/DL — HIGH (ref 70–99)
GLUCOSE BLDC GLUCOMTR-MCNC: 153 MG/DL — HIGH (ref 70–99)
GLUCOSE BLDC GLUCOMTR-MCNC: 160 MG/DL — HIGH (ref 70–99)
GLUCOSE BLDC GLUCOMTR-MCNC: 160 MG/DL — HIGH (ref 70–99)
GLUCOSE SERPL-MCNC: 146 MG/DL — HIGH (ref 70–99)
GLUCOSE SERPL-MCNC: 146 MG/DL — HIGH (ref 70–99)
HCT VFR BLD CALC: 32.3 % — LOW (ref 34.5–45)
HCT VFR BLD CALC: 32.3 % — LOW (ref 34.5–45)
HGB BLD-MCNC: 9.9 G/DL — LOW (ref 11.5–15.5)
HGB BLD-MCNC: 9.9 G/DL — LOW (ref 11.5–15.5)
MAGNESIUM SERPL-MCNC: 2 MG/DL — SIGNIFICANT CHANGE UP (ref 1.6–2.6)
MAGNESIUM SERPL-MCNC: 2 MG/DL — SIGNIFICANT CHANGE UP (ref 1.6–2.6)
MCHC RBC-ENTMCNC: 28 PG — SIGNIFICANT CHANGE UP (ref 27–34)
MCHC RBC-ENTMCNC: 28 PG — SIGNIFICANT CHANGE UP (ref 27–34)
MCHC RBC-ENTMCNC: 30.7 GM/DL — LOW (ref 32–36)
MCHC RBC-ENTMCNC: 30.7 GM/DL — LOW (ref 32–36)
MCV RBC AUTO: 91.5 FL — SIGNIFICANT CHANGE UP (ref 80–100)
MCV RBC AUTO: 91.5 FL — SIGNIFICANT CHANGE UP (ref 80–100)
NRBC # BLD: 0 /100 WBCS — SIGNIFICANT CHANGE UP (ref 0–0)
NRBC # BLD: 0 /100 WBCS — SIGNIFICANT CHANGE UP (ref 0–0)
PHOSPHATE SERPL-MCNC: 4.7 MG/DL — HIGH (ref 2.5–4.5)
PHOSPHATE SERPL-MCNC: 4.7 MG/DL — HIGH (ref 2.5–4.5)
PLATELET # BLD AUTO: 128 K/UL — LOW (ref 150–400)
PLATELET # BLD AUTO: 128 K/UL — LOW (ref 150–400)
POTASSIUM SERPL-MCNC: 3.9 MMOL/L — SIGNIFICANT CHANGE UP (ref 3.5–5.3)
POTASSIUM SERPL-MCNC: 3.9 MMOL/L — SIGNIFICANT CHANGE UP (ref 3.5–5.3)
POTASSIUM SERPL-SCNC: 3.9 MMOL/L — SIGNIFICANT CHANGE UP (ref 3.5–5.3)
POTASSIUM SERPL-SCNC: 3.9 MMOL/L — SIGNIFICANT CHANGE UP (ref 3.5–5.3)
RBC # BLD: 3.53 M/UL — LOW (ref 3.8–5.2)
RBC # BLD: 3.53 M/UL — LOW (ref 3.8–5.2)
RBC # FLD: 16.1 % — HIGH (ref 10.3–14.5)
RBC # FLD: 16.1 % — HIGH (ref 10.3–14.5)
SODIUM SERPL-SCNC: 137 MMOL/L — SIGNIFICANT CHANGE UP (ref 135–145)
SODIUM SERPL-SCNC: 137 MMOL/L — SIGNIFICANT CHANGE UP (ref 135–145)
WBC # BLD: 6.6 K/UL — SIGNIFICANT CHANGE UP (ref 3.8–10.5)
WBC # BLD: 6.6 K/UL — SIGNIFICANT CHANGE UP (ref 3.8–10.5)
WBC # FLD AUTO: 6.6 K/UL — SIGNIFICANT CHANGE UP (ref 3.8–10.5)
WBC # FLD AUTO: 6.6 K/UL — SIGNIFICANT CHANGE UP (ref 3.8–10.5)

## 2023-12-05 PROCEDURE — 99233 SBSQ HOSP IP/OBS HIGH 50: CPT

## 2023-12-05 RX ORDER — INSULIN LISPRO 100/ML
VIAL (ML) SUBCUTANEOUS EVERY 6 HOURS
Refills: 0 | Status: DISCONTINUED | OUTPATIENT
Start: 2023-12-05 | End: 2024-01-07

## 2023-12-05 RX ADMIN — AMLODIPINE BESYLATE 10 MILLIGRAM(S): 2.5 TABLET ORAL at 05:30

## 2023-12-05 RX ADMIN — Medication 1 MILLIGRAM(S): at 21:36

## 2023-12-05 RX ADMIN — INSULIN HUMAN 11 UNIT(S): 100 INJECTION, SOLUTION SUBCUTANEOUS at 18:09

## 2023-12-05 RX ADMIN — NYSTATIN CREAM 1 APPLICATION(S): 100000 CREAM TOPICAL at 05:27

## 2023-12-05 RX ADMIN — INSULIN GLARGINE 20 UNIT(S): 100 INJECTION, SOLUTION SUBCUTANEOUS at 06:09

## 2023-12-05 RX ADMIN — ESCITALOPRAM OXALATE 10 MILLIGRAM(S): 10 TABLET, FILM COATED ORAL at 07:42

## 2023-12-05 RX ADMIN — Medication 2 DROP(S): at 05:27

## 2023-12-05 RX ADMIN — PANTOPRAZOLE SODIUM 40 MILLIGRAM(S): 20 TABLET, DELAYED RELEASE ORAL at 07:41

## 2023-12-05 RX ADMIN — GABAPENTIN 100 MILLIGRAM(S): 400 CAPSULE ORAL at 21:36

## 2023-12-05 RX ADMIN — INSULIN HUMAN 11 UNIT(S): 100 INJECTION, SOLUTION SUBCUTANEOUS at 12:21

## 2023-12-05 RX ADMIN — Medication 1 APPLICATION(S): at 05:27

## 2023-12-05 RX ADMIN — CHLORHEXIDINE GLUCONATE 15 MILLILITER(S): 213 SOLUTION TOPICAL at 18:07

## 2023-12-05 RX ADMIN — Medication 1 TABLET(S): at 05:30

## 2023-12-05 RX ADMIN — Medication 200 MILLIGRAM(S): at 18:06

## 2023-12-05 RX ADMIN — Medication 200 MILLIGRAM(S): at 05:28

## 2023-12-05 RX ADMIN — Medication 1 APPLICATION(S): at 12:22

## 2023-12-05 RX ADMIN — Medication 15 MILLILITER(S): at 12:21

## 2023-12-05 RX ADMIN — Medication 3 MILLILITER(S): at 11:32

## 2023-12-05 RX ADMIN — Medication 5 MILLIGRAM(S): at 21:36

## 2023-12-05 RX ADMIN — NYSTATIN CREAM 1 APPLICATION(S): 100000 CREAM TOPICAL at 23:29

## 2023-12-05 RX ADMIN — Medication 5 MILLIGRAM(S): at 05:29

## 2023-12-05 RX ADMIN — LIDOCAINE 1 PATCH: 4 CREAM TOPICAL at 19:15

## 2023-12-05 RX ADMIN — Medication 1 APPLICATION(S): at 23:37

## 2023-12-05 RX ADMIN — Medication 125 MICROGRAM(S): at 05:29

## 2023-12-05 RX ADMIN — Medication 500 MILLIGRAM(S): at 12:22

## 2023-12-05 RX ADMIN — PHENYLEPHRINE-SHARK LIVER OIL-MINERAL OIL-PETROLATUM RECTAL OINTMENT 1 APPLICATION(S): at 12:22

## 2023-12-05 RX ADMIN — NYSTATIN CREAM 1 APPLICATION(S): 100000 CREAM TOPICAL at 13:29

## 2023-12-05 RX ADMIN — SIMETHICONE 80 MILLIGRAM(S): 80 TABLET, CHEWABLE ORAL at 18:06

## 2023-12-05 RX ADMIN — CHLORHEXIDINE GLUCONATE 15 MILLILITER(S): 213 SOLUTION TOPICAL at 05:28

## 2023-12-05 RX ADMIN — Medication 500 MILLIGRAM(S): at 06:24

## 2023-12-05 RX ADMIN — Medication 3 MILLILITER(S): at 17:42

## 2023-12-05 RX ADMIN — Medication 1: at 18:09

## 2023-12-05 RX ADMIN — DICLOFENAC SODIUM 2 GRAM(S): 30 GEL TOPICAL at 07:42

## 2023-12-05 RX ADMIN — SODIUM CHLORIDE 4 MILLILITER(S): 9 INJECTION INTRAMUSCULAR; INTRAVENOUS; SUBCUTANEOUS at 05:27

## 2023-12-05 RX ADMIN — Medication 500 MILLIGRAM(S): at 23:37

## 2023-12-05 RX ADMIN — CHLORHEXIDINE GLUCONATE 1 APPLICATION(S): 213 SOLUTION TOPICAL at 05:28

## 2023-12-05 RX ADMIN — Medication 1 APPLICATION(S): at 18:07

## 2023-12-05 RX ADMIN — QUETIAPINE FUMARATE 12.5 MILLIGRAM(S): 200 TABLET, FILM COATED ORAL at 18:07

## 2023-12-05 RX ADMIN — Medication 1000 MILLIGRAM(S): at 13:27

## 2023-12-05 RX ADMIN — Medication 1 SPRAY(S): at 23:37

## 2023-12-05 RX ADMIN — ZINC OXIDE 1 APPLICATION(S): 200 OINTMENT TOPICAL at 12:22

## 2023-12-05 RX ADMIN — Medication 2 DROP(S): at 12:22

## 2023-12-05 RX ADMIN — Medication 2: at 12:21

## 2023-12-05 RX ADMIN — SIMETHICONE 80 MILLIGRAM(S): 80 TABLET, CHEWABLE ORAL at 23:37

## 2023-12-05 RX ADMIN — SODIUM CHLORIDE 4 MILLILITER(S): 9 INJECTION INTRAMUSCULAR; INTRAVENOUS; SUBCUTANEOUS at 17:43

## 2023-12-05 RX ADMIN — SIMETHICONE 80 MILLIGRAM(S): 80 TABLET, CHEWABLE ORAL at 05:29

## 2023-12-05 RX ADMIN — Medication 2 DROP(S): at 23:38

## 2023-12-05 RX ADMIN — Medication 500 MILLIGRAM(S): at 12:19

## 2023-12-05 RX ADMIN — Medication 3 MILLILITER(S): at 05:26

## 2023-12-05 RX ADMIN — LIDOCAINE 1 PATCH: 4 CREAM TOPICAL at 12:20

## 2023-12-05 RX ADMIN — Medication 1 TABLET(S): at 18:07

## 2023-12-05 RX ADMIN — SIMETHICONE 80 MILLIGRAM(S): 80 TABLET, CHEWABLE ORAL at 12:20

## 2023-12-05 RX ADMIN — Medication 500 MILLIGRAM(S): at 18:06

## 2023-12-05 RX ADMIN — Medication 2 DROP(S): at 18:07

## 2023-12-05 RX ADMIN — INSULIN HUMAN 22 UNIT(S): 100 INJECTION, SOLUTION SUBCUTANEOUS at 06:10

## 2023-12-05 NOTE — PROGRESS NOTE ADULT - PROBLEM SELECTOR PLAN 8
Continue Home Lexapro 10mg daily   Psych consult appreciated   - Continue Seroquel 12.5mg daily (6PM)

## 2023-12-05 NOTE — PROGRESS NOTE ADULT - TIME BILLING
care coordination, review of radiolopgy, management discussion with the team care coordination, review of radiology, labs, management discussion with the team and coordination of care

## 2023-12-05 NOTE — PROGRESS NOTE ADULT - PROBLEM SELECTOR PLAN 4
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   - Bowel regimen  - PEG Site appears macerated. Multifactorial, possible the PEG has been too tight at home.  - Peg loosened by GI at beside, no leakage or further intervention required   - recurrent RLQ abdominal pain and bleeding at the PEG site  - reevaluated by GI -- no bleeding at the PEG site  - no BM since 11/19 -- increased dose of Senna and increased Miralax to BID   - 11/28 frequent multiple BMs, senna and Miralax d/c'd - GI evaluated  - Continue Simethicone.  - 12/1: s/p abdominal ultrasound - limited study   - (12/3): Pt is c/o abd pain, and daughter is concerned   - AM amylase and Lipase all wnl , CT A/P 12/3 - no acute findings

## 2023-12-05 NOTE — PROGRESS NOTE ADULT - NS ATTEND AMEND GEN_ALL_CORE FT
73F with a h/o DM, HTN, HLD, anemia, hypothyroidism, RA, fibromyalgia, remote cerebral aneurysm with repair, hypercapnic respiratory failure s/p tracheostomy/PEG, bedbound, sacral pressure ulcer. Admitted w/ bleeding from tracheostomy and PEG w/ associated abdominal pain, recent hx of auditory/visual hallucinations. Since admission, no further bleeding noted from either trach or PEG. Imaging showed mild thickening along PEG tube tract and sacral ulcer extending to coccyx suggestive of OM.     # Osteomyelitis  # Chronic hypoxemic RF  # oropharyngeal dysphagia  # acute on chronic pain  # Trach/Peg bleeding  # Tracheitis with Pseudomonas and serratia  # Hx of hallucination, anxiety - particularly in setting of prior antibiotics    #Neuro - awake, alert, and interactive. moving all extremities  - Patient presently calm and following commands appropriately. Continue current medications  - Behavioral Health Follow-up as needed   - c/w Melatonin 6mg at bedtime for sleep   #Cardiovascular - hemodynamically stable  #Pulmonary - acute on chronic hypoxemic respiratory failure - on home vent after extensive hospitalizations over the prior year since being trached last December - PSV as able though still requiring high support  - ENT follow-up as needed - previously changed to 8XLT trach   - Maintain O2 sat > 90%  - Continue hypersal for now  #Gastro - oropharyngeal dysphagia with PEG tube in place, tolerating, nutrition follow up appreciated   - Abdominal pain- abdominal sono 12/1 -No definite sonographic evidence for acute cholecystitis.  CT abdomen 12/4- No acute intra-abdominal findings. Sacral decubitus wound, stable to mildly improved. Bibasilar lung consolidation. Scattered nodular opacities in the visualized lung base  Urinary retention likely the cause of her abdominal discomfort- noted on bladder scans and requiring straight cath- d/c Oxybutynin and monitor urine output  -  No further diarrhea/loose BM today since stool softeners stopped  - History of c. diff previously treated per daughter - if diarrhea recurs off stool softeners will need to repeat testing for this.   #Infectious Disease - sacral osteo based on MRI - wound care follow-up (per daughter had debridement in past at another hospitalization)  - On review of wound care notes there does appear to be some improvement in at least one dimension of wound from admission measurements. The patient has had this wound since a hospitalization in December and per daughter does not have chronic or multiple wounds but only this single wound. Unclear if wound culture is needed will f/u with wound team.  - Continue Cipro and Keflex ass per ID - with plan for 6 week course in total (approx 12/10). Will continue Cipro in D5 despite hyperglycemia given improved absorption compared to PO per d/w ID and pharmacy  - Per daughter, patient has had prior multiple infections during hospitalizations including prior pseudomonas, MRSA, E faecalis, and Klebsiella  - Sputum now with MDR Pseudomonas - ID follow-up however would consider holding off targeting this as respiratory status and secretions are stable   - Follow-up repeat blood cultures thus far negative. Urine culture (VRE) not likely a true infection per d/w ID  - Daughter reports a recent dental abscess and being told by outpatient dentist that patient needed teeth extracted - Dental evaluation noted with no plans for intervention as inpatient.   - Leukocytosis waxing/waning last few days but no fevers and clinically patient looks improved. Will continue to monitor  #Hem/Onc - prior cytopenias in setting of antibiotics per patient's daughter   - Hem/Onc follow-up noted - suspect an anemia of chronic disease  #Pain - continue current pain control - in setting of fibromyalgia and pain from wound  - Voltaren topical, lidocaine patches and low dose Oxycodone as needed  #Derm - previously seen by derm for skin lesions - mid back with irritated seborrheic keratosis - outpatient follow-up  #Endo - DM2 - continue insulin and adjust as needed for goal FS < 180  - Endocrine follow-up for assistance with hyperglycemia management  - Continue Synthroid - TSH WNL  #DVT proph - SCD  - Prior prophylactic AC stopped after concern for prior bleeding  Above was discussed with the patient's daughter and  at bedside, all questions answered 73F with a h/o DM, HTN, HLD, anemia, hypothyroidism, RA, fibromyalgia, remote cerebral aneurysm with repair, hypercapnic respiratory failure s/p tracheostomy/PEG, bedbound, sacral pressure ulcer. Admitted w/ bleeding from tracheostomy and PEG w/ associated abdominal pain, recent hx of auditory/visual hallucinations. Since admission, no further bleeding noted from either trach or PEG. Imaging showed mild thickening along PEG tube tract and sacral ulcer extending to coccyx suggestive of OM.     # Osteomyelitis  # Chronic hypoxemic RF  # oropharyngeal dysphagia  # acute on chronic pain  # Trach/Peg bleeding  # Tracheitis with Pseudomonas and serratia  # Hx of hallucination, anxiety - particularly in setting of prior antibiotics    #Neuro - awake, alert, and interactive. moving all extremities  - Patient presently calm and following commands appropriately. Continue current medications  - Behavioral Health Follow-up as needed   - c/w Melatonin 6mg at bedtime for sleep   #Cardiovascular - hemodynamically stable  #Pulmonary - chronic hypoxemic respiratory failure, has 8XLT trach, on home vent, c/w PSV as able though still requiring high support  #Gastro - oropharyngeal dysphagia with PEG tube in place, tolerating, nutrition follow up appreciated   - Abdominal pain- improved today as she is voiding, no significant urinary retention, cont to hold Oxybutinin  Abdominal sono 12/1 and CT abdomen 12/4- No acute intra-abdominal findings.   - History of c. diff previously treated per daughter - normal BMS, if diarrhea recurs off stool softeners will need to repeat testing for this.   #Infectious Disease - sacral osteo on MRI - wound care follow-up appreciated, c/w offloading and local wound care  - On review of wound care notes there does appear to be some improvement in at least one dimension of wound from admission measurements. The patient has had this wound since a hospitalization in December 2022 and per daughter does not have chronic or multiple wounds but only this single wound.   - C/w 6 week course of Cipro and Keflex as per ID - (to complete approx 12/10).   - Per daughter, patient has had prior multiple infections during hospitalizations including prior pseudomonas, MRSA, E faecalis, and Klebsiella  - Sputum colonized with MDR Pseudomonas   - Daughter reports a recent dental abscess and being told by outpatient dentist that patient needed teeth extracted - Dental evaluation noted with no plans for intervention as inpatient.   #Hem/Onc - prior cytopenias in setting of antibiotics per patient's daughter   - Hem/Onc follow-up noted - suspect an anemia of chronic disease  #Pain - continue current pain control - in setting of fibromyalgia and pain from wound  - Voltaren topical, lidocaine patches and low dose Oxycodone as needed  #Derm - previously seen by derm for skin lesions - mid back with irritated seborrheic keratosis - outpatient follow-up  #Endo - DM2 - continue insulin and adjust as needed for goal FS < 180  - Endocrine follow-up appreciate   - Continue Synthroid - TSH WNL  #DVT proph - SCDs  - Prior prophylactic AC stopped after concern for prior bleeding

## 2023-12-05 NOTE — CHART NOTE - NSCHARTNOTEFT_GEN_A_CORE
Nutrition Follow Up Note  Patient seen for: Nutrition Follow Up    Chart reviewed, events noted. See Pulmonology note for comprehensive details re: clinical course. Interim event: S/P CT A/P for abdominal pain. No intraabdominal findings noted. Continues on antibiotics as ordered in setting of osteomyelitis.     Source: [] Patient       [x] EMR        [x] RN        [] Family at bedside       [x] Other: interdisciplinary medical team    -If unable to interview patient: [x] Trach/Vent/BiPAP  [x] Disoriented/confused/inappropriate to interview    Diet Order:   Diet, NPO with Tube Feed:   Tube Feeding Modality: Gastrostomy  Epic! glucose support 1.2  Total Volume for 24 Hours (mL): 1200  Continuous  Starting Tube Feed Rate {mL per Hour}: 60  Increase Tube Feed Rate by (mL): 0  Until Goal Tube Feed Rate (mL per Hour): 60  Tube Feed Duration (in Hours): 20  Tube Feed Start Time: 06:00  Tube Feed Stop Time: 02:00  Free Water Flush  Pump   Rate (mL per Hour): 10   Frequency: Every 2 Hours  Alfred(7 Gm Arginine/7 Gm Glut/1.2 Gm HMB     Qty per Day:  2 (23)    EN Provides: 1200ml, 1440kcal (30kcal/kg), 77g protein (1.6g/kg).  Alfred will provide additional glutamine and arginine amino acids with collagen for wound healing. continue.     Is current diet order appropriate/adequate? [x] Yes  []  No:     EN Provision: Pt has received >75% of calorie/protein needs x previous 5 days     Nutrition-related concerns:  -Free water flush ordered, see above  -Pt vegan, but per previous discussion with daughter, Alfred is okay. See nutrition note .   -A1c 7.5%. Blood glucose levels being monitored. Pt prescribed Humulin 22u q AM, Humulin 11u twice daily, Lantus 20u q AM and insulin lispro sliding scale. To note, pt on Prednisone. Endocrinology team following. See NP note . Oral synthroid ordered and continues on 20 hour feeds.   -Pt received Epic! Peptide 1.5 at home per previous RD note.   -Prescribed vitamin C, Centrum to aid in wound healing (see below).     Weights:   Daily Weight in kG:  Dosing weight 54.6kg  10/29:  reports pt UBW 90 pounds prior to illness (~41kg).   Daily Weight in k.4 (-), Daily Weight in k.3 (-), Daily Weight in k.9 (), Daily Weight in k.9 ()  Pt with some weight gain since admission. noted with edema (see below).   RD will continue to monitor trends.   IBW + 10% 48kg    MEDICATIONS  (STANDING):  amLODIPine   Tablet  ascorbic acid  calcium carbonate    500 mG (Tums) Chewable  cephalexin  ciprofloxacin   IVPB  dextrose 50% Injectable  dextrose 50% Injectable  dextrose 50% Injectable  dextrose 50% Injectable  dextrose Oral Gel  glucagon  Injectable  insulin glargine Injectable (LANTUS)  insulin lispro (ADMELOG) corrective regimen sliding scale  insulin regular  human recombinant  insulin regular  human recombinant  levothyroxine  multivitamin/minerals/iron Oral Solution (CENTRUM)  pantoprazole   Suspension  predniSONE   Tablet  simethicone    Pertinent Labs:  @ 06:58: Na 137, BUN 16, Cr <0.30<L>, <H>, K+ 3.9, Phos 4.7<H>, Mg 2.0, Alk Phos --, ALT/SGPT --, AST/SGOT --, HbA1c --    A1C with Estimated Average Glucose Result: 7.5 % (10-28-23 @ 07:18)    Finger Sticks:  POCT Blood Glucose.: 160 mg/dL ( @ 11:38)  POCT Blood Glucose.: 136 mg/dL ( @ 06:06)  POCT Blood Glucose.: 100 mg/dL ( @ 23:35)  POCT Blood Glucose.: 123 mg/dL ( @ 17:14)    Triglycerides, Serum: 192 mg/dL (23 @ 07:05)    Skin per nursing documentation: stage IV sacrum  Edema: 1+ generalized    Estimated Needs using IBW + 10% considering increased needs and edema   27-32kcal/kg 1296-1536kcal/day  1.3-1.6g/kg 62-77g protein/day  defer fluid needs to team    Nutrition Diagnosis: Increased Nutrient Needs  Nutrition Diagnosis is: [x] ongoing  [] resolved [] not applicable     New Nutrition Diagnosis: [x] Not applicable    Nutrition Care Plan:  [x] In Progress  [] Achieved  [] Not applicable    Nutrition Interventions:     Education Provided:       [] Yes:  [x] No:        Recommendations:      -Can continue Epic! Glucose Support at 60ml/hr x 20 hours to provide a total volume of 1200ml, 1440kcal (30kcal/kg), 77g protein (1.6g/kg). Update tube feed time: start 06:00 and run for 20 hours until 02:00. 20 hour feed will allow time for synthroid administration, noted to be ordered to be given at 05:00.   -Alfred will provide additional glutamine and arginine amino acids with collagen for wound healing. continue.   -Continue Vitamin C and Multivitamin to aid in wound healing.   -Defer free water flush to team.     Monitoring and Evaluation:   Continue to monitor nutritional intake, tolerance to diet prescription, weights, labs, skin integrity    RD remains available upon request and will follow up per protocol     Hannah Rizvi RD, available via TEAMS Nutrition Follow Up Note  Patient seen for: Nutrition Follow Up    Chart reviewed, events noted. See Pulmonology note for comprehensive details re: clinical course. Interim event: S/P CT A/P for abdominal pain. No intraabdominal findings noted. Continues on antibiotics as ordered in setting of osteomyelitis.     Source: [] Patient       [x] EMR        [x] RN        [] Family at bedside       [x] Other: interdisciplinary medical team    -If unable to interview patient: [x] Trach/Vent/BiPAP  [x] Disoriented/confused/inappropriate to interview    Diet Order:   Diet, NPO with Tube Feed:   Tube Feeding Modality: Gastrostomy  Masterseek glucose support 1.2  Total Volume for 24 Hours (mL): 1200  Continuous  Starting Tube Feed Rate {mL per Hour}: 60  Increase Tube Feed Rate by (mL): 0  Until Goal Tube Feed Rate (mL per Hour): 60  Tube Feed Duration (in Hours): 20  Tube Feed Start Time: 06:00  Tube Feed Stop Time: 02:00  Free Water Flush  Pump   Rate (mL per Hour): 10   Frequency: Every 2 Hours  Alfred(7 Gm Arginine/7 Gm Glut/1.2 Gm HMB     Qty per Day:  2 (23)    EN Provides: 1200ml, 1440kcal (30kcal/kg), 77g protein (1.6g/kg).  Alfred will provide additional glutamine and arginine amino acids with collagen for wound healing. continue.     Is current diet order appropriate/adequate? [x] Yes  []  No:     EN Provision: Pt has received >75% of calorie/protein needs x previous 5 days     Nutrition-related concerns:  -Free water flush ordered, see above  -Pt vegan, but per previous discussion with daughter, Alfred is okay. See nutrition note .   -A1c 7.5%. Blood glucose levels being monitored. Pt prescribed Humulin 22u q AM, Humulin 11u twice daily, Lantus 20u q AM and insulin lispro sliding scale. To note, pt on Prednisone. Endocrinology team following. See NP note . Oral synthroid ordered and continues on 20 hour feeds.   -Pt received Masterseek Peptide 1.5 at home per previous RD note.   -Prescribed vitamin C, Centrum to aid in wound healing (see below).     Weights:   Daily Weight in kG:  Dosing weight 54.6kg  10/29:  reports pt UBW 90 pounds prior to illness (~41kg).   Daily Weight in k.4 (-), Daily Weight in k.3 (-), Daily Weight in k.9 (), Daily Weight in k.9 ()  Pt with some weight gain since admission. noted with edema (see below).   RD will continue to monitor trends.   IBW + 10% 48kg    MEDICATIONS  (STANDING):  amLODIPine   Tablet  ascorbic acid  calcium carbonate    500 mG (Tums) Chewable  cephalexin  ciprofloxacin   IVPB  dextrose 50% Injectable  dextrose 50% Injectable  dextrose 50% Injectable  dextrose 50% Injectable  dextrose Oral Gel  glucagon  Injectable  insulin glargine Injectable (LANTUS)  insulin lispro (ADMELOG) corrective regimen sliding scale  insulin regular  human recombinant  insulin regular  human recombinant  levothyroxine  multivitamin/minerals/iron Oral Solution (CENTRUM)  pantoprazole   Suspension  predniSONE   Tablet  simethicone    Pertinent Labs:  @ 06:58: Na 137, BUN 16, Cr <0.30<L>, <H>, K+ 3.9, Phos 4.7<H>, Mg 2.0, Alk Phos --, ALT/SGPT --, AST/SGOT --, HbA1c --    A1C with Estimated Average Glucose Result: 7.5 % (10-28-23 @ 07:18)    Finger Sticks:  POCT Blood Glucose.: 160 mg/dL ( @ 11:38)  POCT Blood Glucose.: 136 mg/dL ( @ 06:06)  POCT Blood Glucose.: 100 mg/dL ( @ 23:35)  POCT Blood Glucose.: 123 mg/dL ( @ 17:14)    Triglycerides, Serum: 192 mg/dL (23 @ 07:05)    Skin per nursing documentation: stage IV sacrum  Edema: 1+ generalized    Estimated Needs using IBW + 10% considering increased needs and edema   27-32kcal/kg 1296-1536kcal/day  1.3-1.6g/kg 62-77g protein/day  defer fluid needs to team    Nutrition Diagnosis: Increased Nutrient Needs  Nutrition Diagnosis is: [x] ongoing  [] resolved [] not applicable     New Nutrition Diagnosis: [x] Not applicable    Nutrition Care Plan:  [x] In Progress  [] Achieved  [] Not applicable    Nutrition Interventions:     Education Provided:       [] Yes:  [x] No:        Recommendations:      -Can continue Masterseek Glucose Support at 60ml/hr x 20 hours to provide a total volume of 1200ml, 1440kcal (30kcal/kg), 77g protein (1.6g/kg). Update tube feed time: start 06:00 and run for 20 hours until 02:00. 20 hour feed will allow time for synthroid administration, noted to be ordered to be given at 05:00.   -Alfred will provide additional glutamine and arginine amino acids with collagen for wound healing. continue.   -Continue Vitamin C and Multivitamin to aid in wound healing.   -Defer free water flush to team.     Monitoring and Evaluation:   Continue to monitor nutritional intake, tolerance to diet prescription, weights, labs, skin integrity    RD remains available upon request and will follow up per protocol     Hannah Rizvi RD, available via TEAMS

## 2023-12-05 NOTE — PROGRESS NOTE ADULT - PROBLEM SELECTOR PLAN 1
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 aureus  - Sputum Cx 11/6: + PSA and Serratia, Cefepime 11/8 -->11/15  - 11/26 sputum culture MDR pseudomonas - clinically stable, defer treatment unless decompensates as per ID  - 11/26 urine culture - enterococcus - no need to treat - cfu low, organism not significant as per ID  -- see tx of sacral OM below

## 2023-12-05 NOTE — PROGRESS NOTE ADULT - PROBLEM SELECTOR PLAN 1
MRI confirms acute osteomyelitis of sacrum. The patient has had the sacral ulcer for about 12 months.  This is related to a pressure ulcer.  As per description by the family, the wound has improved Substantially.  On Keflex    MDR Pseudomonas colonization in sputum, no need to treat as per pulm

## 2023-12-05 NOTE — PROGRESS NOTE ADULT - PROBLEM SELECTOR PLAN 2
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   - S/p CT abd/pelvis (10/28): Sacral decubitus ulcer extending to the coccyx with osseous remodeling and pelvic MRI or bone scan to recc to exclude osteomyelitis.  - 10/30 wound care note: Sacral stage 4 pressure injury 4.5cm x 2.5cm x 1.5cm w/ lip of undermining circumferentially greatest 9-12 of 3cm.  - MRI pelvis 10/30 with Osteomyelitis, c/w current Cefepime for 7 days, Vancomycin d/marli   - Elevated, ESR 85   - Elevated,    - Wound care on board  - 11/10: resumed Cipro 500mg q 12 and Keflex 500mg q 6 until 12/10.  - 11/20: Changed to IV Cipro 400 q12 as recommended by ID pharmacist due to multiple drug interactions when given via PEG  - 11/28 wound care note: Sacral stage 4pressure injury 4.5cm x 2.5cm x 0.5cm, moist granular wound bed   palpable but not exposed bone no blistering, (+) serosanguinous drainage. No odor, erythema, increased warmth, tenderness, induration, fluctuance, nor crepitus.  - (12/3) Wound cx collected on 11/29, has been cancelled, unsure why. Plan to follow up with Wound care team to recollect new specimen 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   - S/p CT abd/pelvis (10/28): Sacral decubitus ulcer extending to the coccyx with osseous remodeling and pelvic MRI or bone scan to recc to exclude osteomyelitis.  - 10/30 wound care note: Sacral stage 4 pressure injury 4.5cm x 2.5cm x 1.5cm w/ lip of undermining circumferentially greatest 9-12 of 3cm.  - MRI pelvis 10/30 with Osteomyelitis, c/w current Cefepime for 7 days, Vancomycin d/marli   - Elevated, ESR 85   - Elevated,    - Wound care on board  - 11/10: resumed Cipro 500mg q 12 and Keflex 500mg q 6 until 12/10.  - 11/20: Changed to IV Cipro 400 q12 as recommended by ID pharmacist due to multiple drug interactions when given via PEG  - 11/28 wound care note: Sacral stage 4pressure injury 4.5cm x 2.5cm x 0.5cm, moist granular wound bed   palpable but not exposed bone no blistering, (+) serosanguinous drainage. No odor, erythema, increased warmth, tenderness, induration, fluctuance, nor crepitus.  - 12/3 wound culture ordered - d/c'd as no need for culture, wound improving, no signs of infection

## 2023-12-05 NOTE — PROGRESS NOTE ADULT - ASSESSMENT
74 y/o F with PMHx of T2DM, HTN, HLD, anemia, hypothyroidism, RA, fibromyalgia, remote cerebral aneurysm repair, acute hypercapnic respiratory failure now with tracheostomy, PEG tube, and bedbound (trach change 8/18, PEG change 8/14), sacral pressure ulcer, BIBEMS from home for 6 day hx of bleeding from the tracheostomy and PEG tube with associated abdominal pain, recent history of auditory and visual hallucinations, and with chronic sacral decubitus ulcer. Exam notable for mild abdominal distention with LLQ and RLQ tenderness, tracheostomy in place with no obvious bleeding, PEG tube with scant amount of dried blood, at baseline neurologic status. Imaging showing no active bleeding around tracheostomy site, however was notable for mild thickening along PEG tube tract, sacral ulcer extending to the coccyx suggestive of possible osteomyelitis, and bibasilar patchy lung opacities with volume loss. Patient admitted for respiratory support, wound care of tracheostomy and PEG, and management of sacral osteomyelitis. No further Trach and peg site  bleeding noted since admission.  Pelvic MRI imaging also suggestive of Acute OM. Patient placed on long-term antibiotics with Infectious disease guidance.  Additionally treated with a 7d course of Cefepime due to PSA and Serratia tracheitis on 11/8-->11/15 and then resumed cipro/keflex.  Hospital course c/b Delirium for which Seroquel was added and home Lexapro continued. Tracheostomy changed to #9 Cuffed Portex by ENT 11/3.  Despite trach change patient remained with persistent air leak.  On 11/19, the trach was again changed due to leak and loss of volumes on vent.  Trach was changed to #8 distal cuffed Shiley.  Patient seen by Dermatology for back lesions.  One diagnosed as a seborrheic keratitis and the other a dermatofibroma.       12/4: S/p CT A/P for abdominal pain - no acute intra-abdominal findings.  Bladder scans q8hr w/ straight cath as needed.  Ditropan d/c'd for retention.  Daughter and pts  at bedside and updated in plan of care.   74 y/o F with PMHx of T2DM, HTN, HLD, anemia, hypothyroidism, RA, fibromyalgia, remote cerebral aneurysm repair, acute hypercapnic respiratory failure now with tracheostomy, PEG tube, and bedbound (trach change 8/18, PEG change 8/14), sacral pressure ulcer, BIBEMS from home for 6 day hx of bleeding from the tracheostomy and PEG tube with associated abdominal pain, recent history of auditory and visual hallucinations, and with chronic sacral decubitus ulcer. Exam notable for mild abdominal distention with LLQ and RLQ tenderness, tracheostomy in place with no obvious bleeding, PEG tube with scant amount of dried blood, at baseline neurologic status. Imaging showing no active bleeding around tracheostomy site, however was notable for mild thickening along PEG tube tract, sacral ulcer extending to the coccyx suggestive of possible osteomyelitis, and bibasilar patchy lung opacities with volume loss. Patient admitted for respiratory support, wound care of tracheostomy and PEG, and management of sacral osteomyelitis. No further Trach and peg site bleeding noted since admission.  Pelvic MRI imaging also suggestive of Acute OM. Patient placed on long-term antibiotics with Infectious disease guidance.  Additionally treated with a 7d course of Cefepime due to PSA and Serratia tracheitis on 11/8-->11/15 and then resumed cipro/keflex.  Hospital course c/b Delirium for which Seroquel was added and home Lexapro continued. Tracheostomy changed to #9 Cuffed Portex by ENT 11/3.  Despite trach change patient remained with persistent air leak.  On 11/19, the trach was again changed due to leak and loss of volumes on vent.  Trach was changed to #8 distal cuffed Shiley.  Patient seen by Dermatology for back lesions.  One diagnosed as a seborrheic keratitis and the other a dermatofibroma.  Intermittent abdominal pain throughout hospital course, at times relieved with gas/BM, w/u negative, seen by GI - no recs.  Remains medically stable while pending discharge home.      12/5 -  Pt stable throughout the day.  Afebrile, hemodynamically stable.  Pt voiding spontaneously, has not required further straight cath'ing.   at bedside during rounds and updated in plan of care.

## 2023-12-05 NOTE — PROGRESS NOTE ADULT - PROBLEM SELECTOR PLAN 7
- s/p Home Levemir 14 units in morning held on admission as noted w/ hypoglycemia   - Enteral feed changed to Transifex diabetic formula; glucose numbers stabilizing. - s/p Home Levemir 14 units in morning held on admission as noted w/ hypoglycemia   - Enteral feed changed to Brenco diabetic formula; glucose numbers stabilizing.

## 2023-12-05 NOTE — PROGRESS NOTE ADULT - SUBJECTIVE AND OBJECTIVE BOX
Patient is a 73y old  Female who presents with a chief complaint of peg tube   DATE OF SERVICE: 12/5/2023      SUBJECTIVE / OVERNIGHT EVENTS: Afebrile.  Afebrile  No new symptoms    MEDICATIONS  (STANDING):  albuterol/ipratropium for Nebulization 3 milliLiter(s) Nebulizer every 6 hours  artificial  tears Solution 2 Drop(s) Both EYES four times a day  ascorbic acid 500 milliGRAM(s) Oral daily  bacitracin   Ointment 1 Application(s) Topical two times a day  bisacodyl Suppository 10 milliGRAM(s) Rectal once  calcium carbonate    500 mG (Tums) Chewable 1 Tablet(s) Chew every 12 hours  cephalexin 500 milliGRAM(s) Oral every 6 hours  chlorhexidine 0.12% Liquid 15 milliLiter(s) Oral Mucosa every 12 hours  chlorhexidine 4% Liquid 1 Application(s) Topical <User Schedule>  ciprofloxacin     Tablet 500 milliGRAM(s) Oral every 12 hours  dextrose 50% Injectable 50 milliLiter(s) IV Push once  enoxaparin Injectable 40 milliGRAM(s) SubCutaneous every 24 hours  escitalopram 10 milliGRAM(s) Oral daily  fentaNYL   Patch  25 MICROgram(s)/Hr 1 Patch Transdermal every 72 hours  gabapentin Solution 100 milliGRAM(s) Enteral Tube at bedtime  insulin glargine Injectable (LANTUS) 14 Unit(s) SubCutaneous every morning  insulin lispro (ADMELOG) corrective regimen sliding scale   SubCutaneous every 6 hours  levothyroxine 125 MICROGram(s) Oral <User Schedule>  lidocaine   4% Patch 1 Patch Transdermal daily  melatonin 3 milliGRAM(s) Oral at bedtime  multivitamin/minerals/iron Oral Solution (CENTRUM) 15 milliLiter(s) Oral daily  nystatin Powder 1 Application(s) Topical every 8 hours  oxybutynin 5 milliGRAM(s) Oral daily  pantoprazole   Suspension 40 milliGRAM(s) Enteral Tube daily  petrolatum Ophthalmic Ointment 1 Application(s) Both EYES four times a day  predniSONE   Tablet 5 milliGRAM(s) Oral daily  QUEtiapine 12.5 milliGRAM(s) Oral <User Schedule>  QUEtiapine 12.5 milliGRAM(s) Oral <User Schedule>  senna Syrup 10 milliLiter(s) Oral at bedtime  simethicone 80 milliGRAM(s) Chew four times a day  sodium chloride 3%  Inhalation 4 milliLiter(s) Inhalation every 12 hours  triamcinolone 0.1% Ointment 1 Application(s) Topical two times a day  zinc oxide 40% Paste 1 Application(s) Topical daily  zinc sulfate 220 milliGRAM(s) Oral daily    MEDICATIONS  (PRN):  acetaminophen   Oral Liquid .. 1000 milliGRAM(s) Enteral Tube every 6 hours PRN Temp greater or equal to 38C (100.4F), Mild Pain (1 - 3)  benzocaine 20% Spray 1 Spray(s) Topical four times a day PRN Cough  diphenhydrAMINE Elixir 25 milliGRAM(s) Oral every 6 hours PRN Rash and/or Itching  oxyCODONE    Solution 2.5 milliGRAM(s) Oral every 6 hours PRN Moderate Pain (4 - 6)  sodium chloride 0.65% Nasal 1 Spray(s) Both Nostrils every 6 hours PRN Nasal Congestion          I&O's Summary    17 Nov 2023 07:01  -  18 Nov 2023 07:00  --------------------------------------------------------  IN: 1300 mL / OUT: 950 mL / NET: 350 mL    18 Nov 2023 07:01  -  18 Nov 2023 17:32  --------------------------------------------------------  IN: 0 mL / OUT: 400 mL / NET: -400 mL        PHYSICAL EXAM:  Vital Signs Last 24 Hrs  T(C): 37.1 (05 Dec 2023 12:00), Max: 37.4 (04 Dec 2023 23:40)  T(F): 98.7 (05 Dec 2023 12:00), Max: 99.4 (04 Dec 2023 23:40)  HR: 90 (05 Dec 2023 15:27) (80 - 100)  BP: 124/69 (05 Dec 2023 12:00) (107/58 - 130/63)  BP(mean): --  RR: 16 (05 Dec 2023 05:45) (16 - 18)  SpO2: 100% (05 Dec 2023 15:27) (98% - 100%)    Parameters below as of 05 Dec 2023 12:00  Patient On (Oxygen Delivery Method): ventilator              GENERAL: NAD, well-developed  HEAD:  Atraumatic, Normocephalic  EYES: EOMI, PERRLA, conjunctiva and sclera clear  NECK: Supple, No JVD. On [facility] vent with a tracheostomy  CHEST/LUNG: Clear to auscultation bilaterally; No wheeze  HEART: Regular rate and rhythm; No murmurs, rubs, or gallops  ABDOMEN: Soft, Nontender, Nondistended; Bowel sounds present  EXTREMITIES:  2+ Peripheral Pulses, No clubbing, cyanosis, or edema  PSYCH: AAOx3  NEUROLOGY: non-focal. Functionally quadriplegic  SKIN: Sacral decubitus dressed    LABS:                                 9.5    8.05  )-----------( 130      ( 28 Nov 2023 06:36 )             32.2                7.5    7.48  )-----------( 134      ( 26 Nov 2023 07:27 )             25.6                                    8.6    8.89  )-----------( 126      ( 21 Nov 2023 07:05 )             29.1                8.9    9.19  )-----------( 124      ( 18 Nov 2023 09:15 )             30.4     11-18    137  |  99  |  29<H>  ----------------------------<  224<H>  4.1   |  28  |  <0.30<L>    Ca    10.0      18 Nov 2023 09:15  Mg     1.8     11-18    TPro  7.4  /  Alb  3.3  /  TBili  0.3  /  DBili  x   /  AST  19  /  ALT  18  /  AlkPhos  48  11-18          Urinalysis Basic - ( 18 Nov 2023 09:15 )    Color: x / Appearance: x / SG: x / pH: x  Gluc: 224 mg/dL / Ketone: x  / Bili: x / Urobili: x   Blood: x / Protein: x / Nitrite: x   Leuk Esterase: x / RBC: x / WBC x   Sq Epi: x / Non Sq Epi: x / Bacteria: x        RADIOLOGY & ADDITIONAL TESTS:    Imaging Personally Reviewed:    Consultant(s) Notes Reviewed:      Care Discussed with Consultants/Other Providers:   Patient is a 73y old  Female who presents with a chief complaint of peg tube   DATE OF SERVICE: 12/5/2023      SUBJECTIVE / OVERNIGHT EVENTS: Afebrile.  Afebrile  No new symptoms    MEDICATIONS  (STANDING):  albuterol/ipratropium for Nebulization 3 milliLiter(s) Nebulizer every 6 hours  artificial  tears Solution 2 Drop(s) Both EYES four times a day  ascorbic acid 500 milliGRAM(s) Oral daily  bacitracin   Ointment 1 Application(s) Topical two times a day  bisacodyl Suppository 10 milliGRAM(s) Rectal once  calcium carbonate    500 mG (Tums) Chewable 1 Tablet(s) Chew every 12 hours  cephalexin 500 milliGRAM(s) Oral every 6 hours  chlorhexidine 0.12% Liquid 15 milliLiter(s) Oral Mucosa every 12 hours  chlorhexidine 4% Liquid 1 Application(s) Topical <User Schedule>  ciprofloxacin     Tablet 500 milliGRAM(s) Oral every 12 hours  dextrose 50% Injectable 50 milliLiter(s) IV Push once  enoxaparin Injectable 40 milliGRAM(s) SubCutaneous every 24 hours  escitalopram 10 milliGRAM(s) Oral daily  fentaNYL   Patch  25 MICROgram(s)/Hr 1 Patch Transdermal every 72 hours  gabapentin Solution 100 milliGRAM(s) Enteral Tube at bedtime  insulin glargine Injectable (LANTUS) 14 Unit(s) SubCutaneous every morning  insulin lispro (ADMELOG) corrective regimen sliding scale   SubCutaneous every 6 hours  levothyroxine 125 MICROGram(s) Oral <User Schedule>  lidocaine   4% Patch 1 Patch Transdermal daily  melatonin 3 milliGRAM(s) Oral at bedtime  multivitamin/minerals/iron Oral Solution (CENTRUM) 15 milliLiter(s) Oral daily  nystatin Powder 1 Application(s) Topical every 8 hours  oxybutynin 5 milliGRAM(s) Oral daily  pantoprazole   Suspension 40 milliGRAM(s) Enteral Tube daily  petrolatum Ophthalmic Ointment 1 Application(s) Both EYES four times a day  predniSONE   Tablet 5 milliGRAM(s) Oral daily  QUEtiapine 12.5 milliGRAM(s) Oral <User Schedule>  QUEtiapine 12.5 milliGRAM(s) Oral <User Schedule>  senna Syrup 10 milliLiter(s) Oral at bedtime  simethicone 80 milliGRAM(s) Chew four times a day  sodium chloride 3%  Inhalation 4 milliLiter(s) Inhalation every 12 hours  triamcinolone 0.1% Ointment 1 Application(s) Topical two times a day  zinc oxide 40% Paste 1 Application(s) Topical daily  zinc sulfate 220 milliGRAM(s) Oral daily    MEDICATIONS  (PRN):  acetaminophen   Oral Liquid .. 1000 milliGRAM(s) Enteral Tube every 6 hours PRN Temp greater or equal to 38C (100.4F), Mild Pain (1 - 3)  benzocaine 20% Spray 1 Spray(s) Topical four times a day PRN Cough  diphenhydrAMINE Elixir 25 milliGRAM(s) Oral every 6 hours PRN Rash and/or Itching  oxyCODONE    Solution 2.5 milliGRAM(s) Oral every 6 hours PRN Moderate Pain (4 - 6)  sodium chloride 0.65% Nasal 1 Spray(s) Both Nostrils every 6 hours PRN Nasal Congestion          I&O's Summary    17 Nov 2023 07:01  -  18 Nov 2023 07:00  --------------------------------------------------------  IN: 1300 mL / OUT: 950 mL / NET: 350 mL    18 Nov 2023 07:01  -  18 Nov 2023 17:32  --------------------------------------------------------  IN: 0 mL / OUT: 400 mL / NET: -400 mL        PHYSICAL EXAM:  Vital Signs Last 24 Hrs  T(C): 37.1 (05 Dec 2023 12:00), Max: 37.4 (04 Dec 2023 23:40)  T(F): 98.7 (05 Dec 2023 12:00), Max: 99.4 (04 Dec 2023 23:40)  HR: 90 (05 Dec 2023 15:27) (80 - 100)  BP: 124/69 (05 Dec 2023 12:00) (107/58 - 130/63)  BP(mean): --  RR: 16 (05 Dec 2023 05:45) (16 - 18)  SpO2: 100% (05 Dec 2023 15:27) (98% - 100%)    Parameters below as of 05 Dec 2023 12:00  Patient On (Oxygen Delivery Method): ventilator              GENERAL: NAD, well-developed  HEAD:  Atraumatic, Normocephalic  EYES: EOMI, PERRLA, conjunctiva and sclera clear  NECK: Supple, No JVD. On [facility] vent with a tracheostomy  CHEST/LUNG: Clear to auscultation bilaterally; No wheeze  HEART: Regular rate and rhythm; No murmurs, rubs, or gallops  ABDOMEN: Soft, Nontender, Nondistended; Bowel sounds present  EXTREMITIES:  2+ Peripheral Pulses, No clubbing, cyanosis, or edema  PSYCH: AAOx3  NEUROLOGY: non-focal. Functionally quadriplegic  SKIN: Sacral decubitus dressed    LABS:                                 9.5    8.05  )-----------( 130      ( 28 Nov 2023 06:36 )             32.2                7.5    7.48  )-----------( 134      ( 26 Nov 2023 07:27 )             25.6                                    8.6    8.89  )-----------( 126      ( 21 Nov 2023 07:05 )             29.1                8.9    9.19  )-----------( 124      ( 18 Nov 2023 09:15 )             30.4     11-18    137  |  99  |  29<H>  ----------------------------<  224<H>  4.1   |  28  |  <0.30<L>    Ca    10.0      18 Nov 2023 09:15  Mg     1.8     11-18    TPro  7.4  /  Alb  3.3  /  TBili  0.3  /  DBili  x   /  AST  19  /  ALT  18  /  AlkPhos  48  11-18          Urinalysis Basic - ( 18 Nov 2023 09:15 )    Color: x / Appearance: x / SG: x / pH: x  Gluc: 224 mg/dL / Ketone: x  / Bili: x / Urobili: x   Blood: x / Protein: x / Nitrite: x   Leuk Esterase: x / RBC: x / WBC x   Sq Epi: x / Non Sq Epi: x / Bacteria: x        RADIOLOGY & ADDITIONAL TESTS:    Imaging Personally Reviewed:    Consultant(s) Notes Reviewed:      Care Discussed with Consultants/Other Providers:

## 2023-12-05 NOTE — CHART NOTE - NSCHARTNOTEFT_GEN_A_CORE
72 y/o F w/h/o T2DM with unknown control due to anemia of chronic disease skewing A1C levels. On Levemir and Humalog insulin PTA. Unknown DM complications. Also h/o HTN, HLD, anemia, hypothyroidism, RA, fibromyalgia, remote cerebral aneurysm repair, acute hypercapnic respiratory failure now with tracheostomy, PEG tube, and bedbound (trach change 8/18, PEG change 8/14), sacral pressure ulcer, BIBEMS from home for 6 day hx of bleeding from the tracheostomy and PEG tube with associated abdominal pain> now resolved. Endocrinology consulted for hyperglycemia. Tolerating TFs form 6am to 2am with BG levels improved today while on present insulin doses. BG goal 100 to low 200s due to age and comorbidities and AMS.    POCT Blood Glucose.: 160 mg/dL (12-05-23 @ 11:38)  POCT Blood Glucose.: 136 mg/dL (12-05-23 @ 06:06)  POCT Blood Glucose.: 100 mg/dL (12-04-23 @ 23:35)  POCT Blood Glucose.: 123 mg/dL (12-04-23 @ 17:14)  POCT Blood Glucose.: 191 mg/dL (12-04-23 @ 11:53)  POCT Blood Glucose.: 143 mg/dL (12-04-23 @ 05:22)  POCT Blood Glucose.: 152 mg/dL (12-03-23 @ 23:28)  POCT Blood Glucose.: 85 mg/dL (12-03-23 @ 17:39)    MEDICATIONS  (STANDING):  cephalexin 500 milliGRAM(s) Oral every 6 hours  ciprofloxacin   IVPB 400 milliGRAM(s) IV Intermittent every 12 hours  insulin glargine Injectable (LANTUS) 20 Unit(s) SubCutaneous every morning  insulin lispro (ADMELOG) corrective regimen sliding scale   SubCutaneous every 6 hours  insulin regular  human recombinant 22 Unit(s) SubCutaneous <User Schedule>  insulin regular  human recombinant 11 Unit(s) SubCutaneous <User Schedule>  levothyroxine 125 MICROGram(s) Oral <User Schedule>  predniSONE   Tablet 5 milliGRAM(s) Oral daily    Diet, NPO with Tube Feed:   Tube Feeding Modality: Gastrostomy  Casie Frazier glucose support 1.2  Total Volume for 24 Hours (mL): 1200  Continuous  Starting Tube Feed Rate {mL per Hour}: 60  Increase Tube Feed Rate by (mL): 0  Until Goal Tube Feed Rate (mL per Hour): 60  Tube Feed Duration (in Hours): 20  Tube Feed Start Time: 06:00  Tube Feed Stop Time: 02:00  Free Water Flush  Pump   Rate (mL per Hour): 10   Frequency: Every 2 Hours  Lafred(7 Gm Arginine/7 Gm Glut/1.2 Gm HMB     Qty per Day:  2 (11-29-23 @ 14:12) [Active]      PLAN:  Type 2 diabetes mellitus with hyperglycemia, with long-term current use of insulin.   -Test BG q6h while on TFs/NPO  -C/w lantus 20 units daily in am  -C/w Regular insulin 22 at 6am, 11 units at 12 noon and 6pm. PLEASE DO NOT HOLD. CAN GIVE LOWER DOSE IF CONCERN FOR HYPOGLYCEMIA.   -Change moderate admelog correction scale every 6 hours to low dose since pt hardly needs any correction scale at this time.   -Will follow and adjust insulin as needed  DISCHARGE:  -Will be determined according to BG/insulin needs/TFs and steroid therapy at time of discharge.  -Per pt's daughter, pt can continue with present Lantus/regular insulin dosing upon discharge while on present TF schedule/formula. No need for Admelog correction scale since pt doesn't need it most of the time.   -Needs telemedicine as out pt due to bedbound status. Can follow at [facility]. [address] [place] [address] [address]. Phone . Will send email closer to discharge  Make sure pt has Rx for all DM supplies and insulin.     Problem/Plan - 2:  ·  Problem: Hypothyroidism.   ·  Plan: -c/w levothyroxine 125mcg daily   TSH and free thyroxine wnl  Please make sure LT4 is given on an empty stomach at least one hour apart from other meds and food to ensure med absorption. DO NOT GIVE WITH VITAMINS/ANTACIDS  C/w LT4 at 5am and PPI at 8am.     Problem/Plan - 3:  ·  Problem: Secondary adrenal insufficiency.   ·  Plan: Due to chronic steroid use pt is at risk of tertiary AI  -c/w prednisone 5mg daily  -BP stable would not increase dose of prednisone at this time.     Problem/Plan - 4:  ·  Problem: HTN (hypertension).   ·  Plan: BP goal <130/80  Manage per primary team.     Problem/Plan - 5:  ·  Problem: Other hyperlipidemia.   ·  Plan: LDL goal <70 due to DM  Pt LDL NA  Order fasting lipid if not recently done  Consistent moderate intensity Statin if not contraindicated   Manage per primary team   F/u levels as out pt. 74 y/o F w/h/o T2DM with unknown control due to anemia of chronic disease skewing A1C levels. On Levemir and Humalog insulin PTA. Unknown DM complications. Also h/o HTN, HLD, anemia, hypothyroidism, RA, fibromyalgia, remote cerebral aneurysm repair, acute hypercapnic respiratory failure now with tracheostomy, PEG tube, and bedbound (trach change 8/18, PEG change 8/14), sacral pressure ulcer, BIBEMS from home for 6 day hx of bleeding from the tracheostomy and PEG tube with associated abdominal pain> now resolved. Endocrinology consulted for hyperglycemia. Tolerating TFs form 6am to 2am with BG levels improved today while on present insulin doses. BG goal 100 to low 200s due to age and comorbidities and AMS.    POCT Blood Glucose.: 160 mg/dL (12-05-23 @ 11:38)  POCT Blood Glucose.: 136 mg/dL (12-05-23 @ 06:06)  POCT Blood Glucose.: 100 mg/dL (12-04-23 @ 23:35)  POCT Blood Glucose.: 123 mg/dL (12-04-23 @ 17:14)  POCT Blood Glucose.: 191 mg/dL (12-04-23 @ 11:53)  POCT Blood Glucose.: 143 mg/dL (12-04-23 @ 05:22)  POCT Blood Glucose.: 152 mg/dL (12-03-23 @ 23:28)  POCT Blood Glucose.: 85 mg/dL (12-03-23 @ 17:39)    MEDICATIONS  (STANDING):  cephalexin 500 milliGRAM(s) Oral every 6 hours  ciprofloxacin   IVPB 400 milliGRAM(s) IV Intermittent every 12 hours  insulin glargine Injectable (LANTUS) 20 Unit(s) SubCutaneous every morning  insulin lispro (ADMELOG) corrective regimen sliding scale   SubCutaneous every 6 hours  insulin regular  human recombinant 22 Unit(s) SubCutaneous <User Schedule>  insulin regular  human recombinant 11 Unit(s) SubCutaneous <User Schedule>  levothyroxine 125 MICROGram(s) Oral <User Schedule>  predniSONE   Tablet 5 milliGRAM(s) Oral daily    Diet, NPO with Tube Feed:   Tube Feeding Modality: Gastrostomy  Casie Frazier glucose support 1.2  Total Volume for 24 Hours (mL): 1200  Continuous  Starting Tube Feed Rate {mL per Hour}: 60  Increase Tube Feed Rate by (mL): 0  Until Goal Tube Feed Rate (mL per Hour): 60  Tube Feed Duration (in Hours): 20  Tube Feed Start Time: 06:00  Tube Feed Stop Time: 02:00  Free Water Flush  Pump   Rate (mL per Hour): 10   Frequency: Every 2 Hours  Alfred(7 Gm Arginine/7 Gm Glut/1.2 Gm HMB     Qty per Day:  2 (11-29-23 @ 14:12) [Active]      PLAN:  Type 2 diabetes mellitus with hyperglycemia, with long-term current use of insulin.   -Test BG q6h while on TFs/NPO  -C/w lantus 20 units daily in am  -C/w Regular insulin 22 at 6am, 11 units at 12 noon and 6pm. PLEASE DO NOT HOLD. CAN GIVE LOWER DOSE IF CONCERN FOR HYPOGLYCEMIA.   -Change moderate admelog correction scale every 6 hours to low dose since pt hardly needs any correction scale at this time.   -Will follow and adjust insulin as needed  DISCHARGE:  -Will be determined according to BG/insulin needs/TFs and steroid therapy at time of discharge.  -Per pt's daughter, pt can continue with present Lantus/regular insulin dosing upon discharge while on present TF schedule/formula. No need for Admelog correction scale since pt doesn't need it most of the time.   -Needs telemedicine as out pt due to bedbound status. Can follow at [facility]. [address] [place] [address] [address]. Phone . Will send email closer to discharge  Make sure pt has Rx for all DM supplies and insulin.     Problem/Plan - 2:  ·  Problem: Hypothyroidism.   ·  Plan: -c/w levothyroxine 125mcg daily   TSH and free thyroxine wnl  Please make sure LT4 is given on an empty stomach at least one hour apart from other meds and food to ensure med absorption. DO NOT GIVE WITH VITAMINS/ANTACIDS  C/w LT4 at 5am and PPI at 8am.     Problem/Plan - 3:  ·  Problem: Secondary adrenal insufficiency.   ·  Plan: Due to chronic steroid use pt is at risk of tertiary AI  -c/w prednisone 5mg daily  -BP stable would not increase dose of prednisone at this time.     Problem/Plan - 4:  ·  Problem: HTN (hypertension).   ·  Plan: BP goal <130/80  Manage per primary team.     Problem/Plan - 5:  ·  Problem: Other hyperlipidemia.   ·  Plan: LDL goal <70 due to DM  Pt LDL NA  Order fasting lipid if not recently done  Consistent moderate intensity Statin if not contraindicated   Manage per primary team   F/u levels as out pt.

## 2023-12-05 NOTE — PROGRESS NOTE ADULT - SUBJECTIVE AND OBJECTIVE BOX
Patient is a 73y old  Female who presents with a chief complaint of bleeding (04 Dec 2023 16:57)      Interval Events:    REVIEW OF SYSTEMS:  [ ] Positive  [ ] All other systems negative  [ ] Unable to assess ROS because ________    Vital Signs Last 24 Hrs  T(C): 36.7 (12-05-23 @ 03:56), Max: 37.4 (12-04-23 @ 23:40)  T(F): 98.1 (12-05-23 @ 03:56), Max: 99.4 (12-04-23 @ 23:40)  HR: 80 (12-05-23 @ 05:45) (80 - 100)  BP: 130/63 (12-05-23 @ 03:56) (107/58 - 144/63)  RR: 16 (12-05-23 @ 05:45) (16 - 18)  SpO2: 100% (12-05-23 @ 05:45) (98% - 100%)    PHYSICAL EXAM:  HEENT:   [ ]Tracheostomy:  [ ]Pupils equal  [ ]No oral lesions  [ ]Abnormal    SKIN  [ ]No Rash  [ ] Abnormal  [ ] pressure    CARDIAC  [ ]Regular  [ ]Abnormal    PULMONARY  [ ]Bilateral Clear Breath Sounds  [ ]Normal Excursion  [ ]Abnormal    GI  [ ]PEG      [ ] +BS		              [ ]Soft, nondistended, nontender	  [ ]Abnormal    MUSCULOSKELETAL                                   [ ]Bedbound                 [ ]Abnormal    [ ]Ambulatory/OOB to chair                           EXTREMITIES                                         [ ]Normal  [ ]Edema                           NEUROLOGIC  [ ] Normal, non focal  [ ] Focal findings:    PSYCHIATRIC  [ ]Alert and appropriate  [ ] Sedated	 [ ]Agitated    :  Mcbride: [ ] Yes, if yes: Date of Placement:                   [  ] No    LINES: Central Lines [ ] Yes, if yes: Date of Placement                                     [  ] No    HOSPITAL MEDICATIONS:  MEDICATIONS  (STANDING):  albuterol/ipratropium for Nebulization 3 milliLiter(s) Nebulizer every 6 hours  amLODIPine   Tablet 10 milliGRAM(s) Oral daily  artificial  tears Solution 2 Drop(s) Both EYES four times a day  ascorbic acid 500 milliGRAM(s) Oral daily  calcium carbonate    500 mG (Tums) Chewable 1 Tablet(s) Chew every 12 hours  cephalexin 500 milliGRAM(s) Oral every 6 hours  chlorhexidine 0.12% Liquid 15 milliLiter(s) Oral Mucosa every 12 hours  chlorhexidine 4% Liquid 1 Application(s) Topical <User Schedule>  ciprofloxacin   IVPB 400 milliGRAM(s) IV Intermittent every 12 hours  dextrose 50% Injectable 25 Gram(s) IV Push once  dextrose 50% Injectable 50 milliLiter(s) IV Push once  dextrose 50% Injectable 12.5 Gram(s) IV Push once  dextrose 50% Injectable 25 Gram(s) IV Push once  dextrose Oral Gel 15 Gram(s) Oral once  escitalopram 10 milliGRAM(s) Oral <User Schedule>  gabapentin Solution 100 milliGRAM(s) Enteral Tube at bedtime  glucagon  Injectable 1 milliGRAM(s) IntraMuscular once  hemorrhoidal Ointment      hemorrhoidal Ointment 1 Application(s) Rectal daily  insulin glargine Injectable (LANTUS) 20 Unit(s) SubCutaneous every morning  insulin lispro (ADMELOG) corrective regimen sliding scale   SubCutaneous every 6 hours  insulin regular  human recombinant 22 Unit(s) SubCutaneous <User Schedule>  insulin regular  human recombinant 11 Unit(s) SubCutaneous <User Schedule>  levothyroxine 125 MICROGram(s) Oral <User Schedule>  lidocaine   4% Patch 1 Patch Transdermal daily  melatonin 5 milliGRAM(s) Oral at bedtime  melatonin 1 milliGRAM(s) Oral at bedtime  multivitamin/minerals/iron Oral Solution (CENTRUM) 15 milliLiter(s) Oral daily  nystatin Powder 1 Application(s) Topical every 8 hours  pantoprazole   Suspension 40 milliGRAM(s) Enteral Tube <User Schedule>  petrolatum Ophthalmic Ointment 1 Application(s) Both EYES four times a day  predniSONE   Tablet 5 milliGRAM(s) Oral daily  QUEtiapine 12.5 milliGRAM(s) Oral <User Schedule>  simethicone 80 milliGRAM(s) Chew four times a day  sodium chloride 3%  Inhalation 4 milliLiter(s) Inhalation every 12 hours  zinc oxide 40% Paste 1 Application(s) Topical daily    MEDICATIONS  (PRN):  acetaminophen   Oral Liquid .. 1000 milliGRAM(s) Enteral Tube every 6 hours PRN Temp greater or equal to 38C (100.4F), Mild Pain (1 - 3)  benzocaine 20% Spray 1 Spray(s) Topical four times a day PRN Cough  diclofenac sodium 1% Gel 2 Gram(s) Topical four times a day PRN pain  diphenhydrAMINE Elixir 25 milliGRAM(s) Oral every 6 hours PRN Rash and/or Itching  guaifenesin/dextromethorphan Oral Liquid 10 milliLiter(s) Oral every 6 hours PRN Cough  oxyCODONE    Solution 2.5 milliGRAM(s) Oral every 6 hours PRN Moderate Pain (4 - 6)  sodium chloride 0.65% Nasal 1 Spray(s) Both Nostrils every 6 hours PRN Nasal Congestion      LABS:                        9.9    6.60  )-----------( 128      ( 05 Dec 2023 06:58 )             32.3     12-05    137  |  99  |  16  ----------------------------<  146<H>  3.9   |  26  |  <0.30<L>    Ca    9.8      05 Dec 2023 06:58  Phos  4.7     12-05  Mg     2.0     12-05        Urinalysis Basic - ( 05 Dec 2023 06:58 )    Color: x / Appearance: x / SG: x / pH: x  Gluc: 146 mg/dL / Ketone: x  / Bili: x / Urobili: x   Blood: x / Protein: x / Nitrite: x   Leuk Esterase: x / RBC: x / WBC x   Sq Epi: x / Non Sq Epi: x / Bacteria: x          CAPILLARY BLOOD GLUCOSE    MICROBIOLOGY:     RADIOLOGY:  [ ] Reviewed and interpreted by me    Mode: AC/ CMV (Assist Control/ Continuous Mandatory Ventilation)  RR (machine): 12  TV (machine): 300  FiO2: 30  PEEP: 5  ITime: 1  MAP: 9  PIP: 27   Patient is a 73y old  Female who presents with a chief complaint of bleeding (04 Dec 2023 16:57)      Interval Events:  No acute events overnight.     REVIEW OF SYSTEMS:  [ ] Positive  [X] All other systems negative  [ ] Unable to assess ROS because ________    Vital Signs Last 24 Hrs  T(C): 36.7 (12-05-23 @ 03:56), Max: 37.4 (12-04-23 @ 23:40)  T(F): 98.1 (12-05-23 @ 03:56), Max: 99.4 (12-04-23 @ 23:40)  HR: 80 (12-05-23 @ 05:45) (80 - 100)  BP: 130/63 (12-05-23 @ 03:56) (107/58 - 144/63)  RR: 16 (12-05-23 @ 05:45) (16 - 18)  SpO2: 100% (12-05-23 @ 05:45) (98% - 100%)    PHYSICAL EXAM:  HEENT:   [X]Tracheostomy: #8 distal XLT [place]  [X]Pupils equal  [ ]No oral lesions  [ ]Abnormal    SKIN  [ ]No Rash  [ ] Abnormal  [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     CARDIAC  [X]Regular  [ ]Abnormal    PULMONARY  [ ]Bilateral Clear Breath Sounds  [ ]Normal Excursion  [X]Abnormal - BL Coarse BS    GI  [X]PEG      [X] +BS		              [X]Soft, nondistended, nontender	  [ ]Abnormal    MUSCULOSKELETAL                                   [X]Bedbound                 [ ]Abnormal    [ ]Ambulatory/OOB to chair                           EXTREMITIES                                         [X]Normal  [ ]Edema                           NEUROLOGIC  [ ] Normal, non focal  [X] Focal findings:  opens eyes spontaneously, follows commands on all 4 extremities, able to mouth words    PSYCHIATRIC  [X]Alert and appropriate  [ ] Sedated	 [ ]Agitated    :  Mcbride: [ ] Yes, if yes: Date of Placement:                   [X] No    LINES: Central Lines [ ] Yes, if yes: Date of Placement                                     [X] No    HOSPITAL MEDICATIONS:  MEDICATIONS  (STANDING):  albuterol/ipratropium for Nebulization 3 milliLiter(s) Nebulizer every 6 hours  amLODIPine   Tablet 10 milliGRAM(s) Oral daily  artificial  tears Solution 2 Drop(s) Both EYES four times a day  ascorbic acid 500 milliGRAM(s) Oral daily  calcium carbonate    500 mG (Tums) Chewable 1 Tablet(s) Chew every 12 hours  cephalexin 500 milliGRAM(s) Oral every 6 hours  chlorhexidine 0.12% Liquid 15 milliLiter(s) Oral Mucosa every 12 hours  chlorhexidine 4% Liquid 1 Application(s) Topical <User Schedule>  ciprofloxacin   IVPB 400 milliGRAM(s) IV Intermittent every 12 hours  dextrose 50% Injectable 25 Gram(s) IV Push once  dextrose 50% Injectable 50 milliLiter(s) IV Push once  dextrose 50% Injectable 12.5 Gram(s) IV Push once  dextrose 50% Injectable 25 Gram(s) IV Push once  dextrose Oral Gel 15 Gram(s) Oral once  escitalopram 10 milliGRAM(s) Oral <User Schedule>  gabapentin Solution 100 milliGRAM(s) Enteral Tube at bedtime  glucagon  Injectable 1 milliGRAM(s) IntraMuscular once  hemorrhoidal Ointment      hemorrhoidal Ointment 1 Application(s) Rectal daily  insulin glargine Injectable (LANTUS) 20 Unit(s) SubCutaneous every morning  insulin lispro (ADMELOG) corrective regimen sliding scale   SubCutaneous every 6 hours  insulin regular  human recombinant 22 Unit(s) SubCutaneous <User Schedule>  insulin regular  human recombinant 11 Unit(s) SubCutaneous <User Schedule>  levothyroxine 125 MICROGram(s) Oral <User Schedule>  lidocaine   4% Patch 1 Patch Transdermal daily  melatonin 5 milliGRAM(s) Oral at bedtime  melatonin 1 milliGRAM(s) Oral at bedtime  multivitamin/minerals/iron Oral Solution (CENTRUM) 15 milliLiter(s) Oral daily  nystatin Powder 1 Application(s) Topical every 8 hours  pantoprazole   Suspension 40 milliGRAM(s) Enteral Tube <User Schedule>  petrolatum Ophthalmic Ointment 1 Application(s) Both EYES four times a day  predniSONE   Tablet 5 milliGRAM(s) Oral daily  QUEtiapine 12.5 milliGRAM(s) Oral <User Schedule>  simethicone 80 milliGRAM(s) Chew four times a day  sodium chloride 3%  Inhalation 4 milliLiter(s) Inhalation every 12 hours  zinc oxide 40% Paste 1 Application(s) Topical daily    MEDICATIONS  (PRN):  acetaminophen   Oral Liquid .. 1000 milliGRAM(s) Enteral Tube every 6 hours PRN Temp greater or equal to 38C (100.4F), Mild Pain (1 - 3)  benzocaine 20% Spray 1 Spray(s) Topical four times a day PRN Cough  diclofenac sodium 1% Gel 2 Gram(s) Topical four times a day PRN pain  diphenhydrAMINE Elixir 25 milliGRAM(s) Oral every 6 hours PRN Rash and/or Itching  guaifenesin/dextromethorphan Oral Liquid 10 milliLiter(s) Oral every 6 hours PRN Cough  oxyCODONE    Solution 2.5 milliGRAM(s) Oral every 6 hours PRN Moderate Pain (4 - 6)  sodium chloride 0.65% Nasal 1 Spray(s) Both Nostrils every 6 hours PRN Nasal Congestion      LABS:                        9.9    6.60  )-----------( 128      ( 05 Dec 2023 06:58 )             32.3     12-05    137  |  99  |  16  ----------------------------<  146<H>  3.9   |  26  |  <0.30<L>    Ca    9.8      05 Dec 2023 06:58  Phos  4.7     12-05  Mg     2.0     12-05        Urinalysis Basic - ( 05 Dec 2023 06:58 )    Color: x / Appearance: x / SG: x / pH: x  Gluc: 146 mg/dL / Ketone: x  / Bili: x / Urobili: x   Blood: x / Protein: x / Nitrite: x   Leuk Esterase: x / RBC: x / WBC x   Sq Epi: x / Non Sq Epi: x / Bacteria: x          CAPILLARY BLOOD GLUCOSE    MICROBIOLOGY:     RADIOLOGY:  [ ] Reviewed and interpreted by me    Mode: AC/ CMV (Assist Control/ Continuous Mandatory Ventilation)  RR (machine): 12  TV (machine): 300  FiO2: 30  PEEP: 5  ITime: 1  MAP: 9  PIP: 27

## 2023-12-06 LAB
ANION GAP SERPL CALC-SCNC: 10 MMOL/L — SIGNIFICANT CHANGE UP (ref 5–17)
ANION GAP SERPL CALC-SCNC: 10 MMOL/L — SIGNIFICANT CHANGE UP (ref 5–17)
BUN SERPL-MCNC: 15 MG/DL — SIGNIFICANT CHANGE UP (ref 7–23)
BUN SERPL-MCNC: 15 MG/DL — SIGNIFICANT CHANGE UP (ref 7–23)
CALCIUM SERPL-MCNC: 9.7 MG/DL — SIGNIFICANT CHANGE UP (ref 8.4–10.5)
CALCIUM SERPL-MCNC: 9.7 MG/DL — SIGNIFICANT CHANGE UP (ref 8.4–10.5)
CHLORIDE SERPL-SCNC: 100 MMOL/L — SIGNIFICANT CHANGE UP (ref 96–108)
CHLORIDE SERPL-SCNC: 100 MMOL/L — SIGNIFICANT CHANGE UP (ref 96–108)
CO2 SERPL-SCNC: 28 MMOL/L — SIGNIFICANT CHANGE UP (ref 22–31)
CO2 SERPL-SCNC: 28 MMOL/L — SIGNIFICANT CHANGE UP (ref 22–31)
CREAT SERPL-MCNC: <0.3 MG/DL — LOW (ref 0.5–1.3)
CREAT SERPL-MCNC: <0.3 MG/DL — LOW (ref 0.5–1.3)
EGFR: 113 ML/MIN/1.73M2 — SIGNIFICANT CHANGE UP
EGFR: 113 ML/MIN/1.73M2 — SIGNIFICANT CHANGE UP
GLUCOSE BLDC GLUCOMTR-MCNC: 106 MG/DL — HIGH (ref 70–99)
GLUCOSE BLDC GLUCOMTR-MCNC: 106 MG/DL — HIGH (ref 70–99)
GLUCOSE BLDC GLUCOMTR-MCNC: 134 MG/DL — HIGH (ref 70–99)
GLUCOSE BLDC GLUCOMTR-MCNC: 134 MG/DL — HIGH (ref 70–99)
GLUCOSE BLDC GLUCOMTR-MCNC: 151 MG/DL — HIGH (ref 70–99)
GLUCOSE BLDC GLUCOMTR-MCNC: 151 MG/DL — HIGH (ref 70–99)
GLUCOSE BLDC GLUCOMTR-MCNC: 180 MG/DL — HIGH (ref 70–99)
GLUCOSE BLDC GLUCOMTR-MCNC: 180 MG/DL — HIGH (ref 70–99)
GLUCOSE SERPL-MCNC: 147 MG/DL — HIGH (ref 70–99)
GLUCOSE SERPL-MCNC: 147 MG/DL — HIGH (ref 70–99)
HCT VFR BLD CALC: 31.8 % — LOW (ref 34.5–45)
HCT VFR BLD CALC: 31.8 % — LOW (ref 34.5–45)
HGB BLD-MCNC: 9.8 G/DL — LOW (ref 11.5–15.5)
HGB BLD-MCNC: 9.8 G/DL — LOW (ref 11.5–15.5)
MAGNESIUM SERPL-MCNC: 1.7 MG/DL — SIGNIFICANT CHANGE UP (ref 1.6–2.6)
MAGNESIUM SERPL-MCNC: 1.7 MG/DL — SIGNIFICANT CHANGE UP (ref 1.6–2.6)
MCHC RBC-ENTMCNC: 28.1 PG — SIGNIFICANT CHANGE UP (ref 27–34)
MCHC RBC-ENTMCNC: 28.1 PG — SIGNIFICANT CHANGE UP (ref 27–34)
MCHC RBC-ENTMCNC: 30.8 GM/DL — LOW (ref 32–36)
MCHC RBC-ENTMCNC: 30.8 GM/DL — LOW (ref 32–36)
MCV RBC AUTO: 91.1 FL — SIGNIFICANT CHANGE UP (ref 80–100)
MCV RBC AUTO: 91.1 FL — SIGNIFICANT CHANGE UP (ref 80–100)
NRBC # BLD: 0 /100 WBCS — SIGNIFICANT CHANGE UP (ref 0–0)
NRBC # BLD: 0 /100 WBCS — SIGNIFICANT CHANGE UP (ref 0–0)
PHOSPHATE SERPL-MCNC: 4.5 MG/DL — SIGNIFICANT CHANGE UP (ref 2.5–4.5)
PHOSPHATE SERPL-MCNC: 4.5 MG/DL — SIGNIFICANT CHANGE UP (ref 2.5–4.5)
PLATELET # BLD AUTO: 127 K/UL — LOW (ref 150–400)
PLATELET # BLD AUTO: 127 K/UL — LOW (ref 150–400)
POTASSIUM SERPL-MCNC: 3.6 MMOL/L — SIGNIFICANT CHANGE UP (ref 3.5–5.3)
POTASSIUM SERPL-MCNC: 3.6 MMOL/L — SIGNIFICANT CHANGE UP (ref 3.5–5.3)
POTASSIUM SERPL-SCNC: 3.6 MMOL/L — SIGNIFICANT CHANGE UP (ref 3.5–5.3)
POTASSIUM SERPL-SCNC: 3.6 MMOL/L — SIGNIFICANT CHANGE UP (ref 3.5–5.3)
RBC # BLD: 3.49 M/UL — LOW (ref 3.8–5.2)
RBC # BLD: 3.49 M/UL — LOW (ref 3.8–5.2)
RBC # FLD: 15.8 % — HIGH (ref 10.3–14.5)
RBC # FLD: 15.8 % — HIGH (ref 10.3–14.5)
SODIUM SERPL-SCNC: 138 MMOL/L — SIGNIFICANT CHANGE UP (ref 135–145)
SODIUM SERPL-SCNC: 138 MMOL/L — SIGNIFICANT CHANGE UP (ref 135–145)
WBC # BLD: 6.87 K/UL — SIGNIFICANT CHANGE UP (ref 3.8–10.5)
WBC # BLD: 6.87 K/UL — SIGNIFICANT CHANGE UP (ref 3.8–10.5)
WBC # FLD AUTO: 6.87 K/UL — SIGNIFICANT CHANGE UP (ref 3.8–10.5)
WBC # FLD AUTO: 6.87 K/UL — SIGNIFICANT CHANGE UP (ref 3.8–10.5)

## 2023-12-06 PROCEDURE — 99232 SBSQ HOSP IP/OBS MODERATE 35: CPT

## 2023-12-06 PROCEDURE — 99233 SBSQ HOSP IP/OBS HIGH 50: CPT

## 2023-12-06 RX ADMIN — NYSTATIN CREAM 1 APPLICATION(S): 100000 CREAM TOPICAL at 18:38

## 2023-12-06 RX ADMIN — Medication 1 TABLET(S): at 18:37

## 2023-12-06 RX ADMIN — INSULIN HUMAN 11 UNIT(S): 100 INJECTION, SOLUTION SUBCUTANEOUS at 12:10

## 2023-12-06 RX ADMIN — Medication 500 MILLIGRAM(S): at 18:37

## 2023-12-06 RX ADMIN — Medication 1 APPLICATION(S): at 18:38

## 2023-12-06 RX ADMIN — Medication 500 MILLIGRAM(S): at 23:36

## 2023-12-06 RX ADMIN — ZINC OXIDE 1 APPLICATION(S): 200 OINTMENT TOPICAL at 12:09

## 2023-12-06 RX ADMIN — Medication 3 MILLILITER(S): at 00:45

## 2023-12-06 RX ADMIN — Medication 1 APPLICATION(S): at 12:05

## 2023-12-06 RX ADMIN — SODIUM CHLORIDE 4 MILLILITER(S): 9 INJECTION INTRAMUSCULAR; INTRAVENOUS; SUBCUTANEOUS at 06:08

## 2023-12-06 RX ADMIN — LIDOCAINE 1 PATCH: 4 CREAM TOPICAL at 19:14

## 2023-12-06 RX ADMIN — Medication 2 DROP(S): at 05:48

## 2023-12-06 RX ADMIN — PANTOPRAZOLE SODIUM 40 MILLIGRAM(S): 20 TABLET, DELAYED RELEASE ORAL at 09:00

## 2023-12-06 RX ADMIN — Medication 15 MILLILITER(S): at 12:08

## 2023-12-06 RX ADMIN — Medication 500 MILLIGRAM(S): at 12:07

## 2023-12-06 RX ADMIN — Medication 3 MILLILITER(S): at 17:13

## 2023-12-06 RX ADMIN — Medication 1 APPLICATION(S): at 23:36

## 2023-12-06 RX ADMIN — Medication 1 SPRAY(S): at 23:37

## 2023-12-06 RX ADMIN — NYSTATIN CREAM 1 APPLICATION(S): 100000 CREAM TOPICAL at 05:48

## 2023-12-06 RX ADMIN — Medication 200 MILLIGRAM(S): at 18:40

## 2023-12-06 RX ADMIN — Medication 3 MILLILITER(S): at 11:50

## 2023-12-06 RX ADMIN — Medication 1: at 07:02

## 2023-12-06 RX ADMIN — NYSTATIN CREAM 1 APPLICATION(S): 100000 CREAM TOPICAL at 22:06

## 2023-12-06 RX ADMIN — Medication 2 DROP(S): at 23:36

## 2023-12-06 RX ADMIN — Medication 500 MILLIGRAM(S): at 05:47

## 2023-12-06 RX ADMIN — SIMETHICONE 80 MILLIGRAM(S): 80 TABLET, CHEWABLE ORAL at 05:47

## 2023-12-06 RX ADMIN — Medication 3 MILLILITER(S): at 23:20

## 2023-12-06 RX ADMIN — ESCITALOPRAM OXALATE 10 MILLIGRAM(S): 10 TABLET, FILM COATED ORAL at 08:50

## 2023-12-06 RX ADMIN — SODIUM CHLORIDE 4 MILLILITER(S): 9 INJECTION INTRAMUSCULAR; INTRAVENOUS; SUBCUTANEOUS at 17:13

## 2023-12-06 RX ADMIN — Medication 3 MILLILITER(S): at 06:08

## 2023-12-06 RX ADMIN — Medication 1 TABLET(S): at 05:47

## 2023-12-06 RX ADMIN — SIMETHICONE 80 MILLIGRAM(S): 80 TABLET, CHEWABLE ORAL at 12:07

## 2023-12-06 RX ADMIN — Medication 5 MILLIGRAM(S): at 22:07

## 2023-12-06 RX ADMIN — Medication 1 MILLIGRAM(S): at 22:07

## 2023-12-06 RX ADMIN — INSULIN GLARGINE 20 UNIT(S): 100 INJECTION, SOLUTION SUBCUTANEOUS at 06:24

## 2023-12-06 RX ADMIN — CHLORHEXIDINE GLUCONATE 1 APPLICATION(S): 213 SOLUTION TOPICAL at 05:48

## 2023-12-06 RX ADMIN — SIMETHICONE 80 MILLIGRAM(S): 80 TABLET, CHEWABLE ORAL at 18:37

## 2023-12-06 RX ADMIN — Medication 1: at 12:10

## 2023-12-06 RX ADMIN — LIDOCAINE 1 PATCH: 4 CREAM TOPICAL at 12:08

## 2023-12-06 RX ADMIN — CHLORHEXIDINE GLUCONATE 15 MILLILITER(S): 213 SOLUTION TOPICAL at 05:47

## 2023-12-06 RX ADMIN — GABAPENTIN 100 MILLIGRAM(S): 400 CAPSULE ORAL at 22:06

## 2023-12-06 RX ADMIN — PHENYLEPHRINE-SHARK LIVER OIL-MINERAL OIL-PETROLATUM RECTAL OINTMENT 1 APPLICATION(S): at 12:12

## 2023-12-06 RX ADMIN — Medication 125 MICROGRAM(S): at 05:47

## 2023-12-06 RX ADMIN — Medication 200 MILLIGRAM(S): at 06:21

## 2023-12-06 RX ADMIN — Medication 2 DROP(S): at 18:38

## 2023-12-06 RX ADMIN — INSULIN HUMAN 11 UNIT(S): 100 INJECTION, SOLUTION SUBCUTANEOUS at 18:41

## 2023-12-06 RX ADMIN — INSULIN HUMAN 22 UNIT(S): 100 INJECTION, SOLUTION SUBCUTANEOUS at 06:20

## 2023-12-06 RX ADMIN — LIDOCAINE 1 PATCH: 4 CREAM TOPICAL at 00:20

## 2023-12-06 RX ADMIN — Medication 1 APPLICATION(S): at 06:27

## 2023-12-06 RX ADMIN — Medication 5 MILLIGRAM(S): at 05:47

## 2023-12-06 RX ADMIN — SIMETHICONE 80 MILLIGRAM(S): 80 TABLET, CHEWABLE ORAL at 23:36

## 2023-12-06 RX ADMIN — QUETIAPINE FUMARATE 12.5 MILLIGRAM(S): 200 TABLET, FILM COATED ORAL at 18:40

## 2023-12-06 RX ADMIN — Medication 2 DROP(S): at 12:09

## 2023-12-06 RX ADMIN — AMLODIPINE BESYLATE 10 MILLIGRAM(S): 2.5 TABLET ORAL at 05:47

## 2023-12-06 RX ADMIN — CHLORHEXIDINE GLUCONATE 15 MILLILITER(S): 213 SOLUTION TOPICAL at 19:02

## 2023-12-06 NOTE — PROGRESS NOTE ADULT - PROBLEM SELECTOR PLAN 2
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   - S/p CT abd/pelvis (10/28): Sacral decubitus ulcer extending to the coccyx with osseous remodeling and pelvic MRI or bone scan to recc to exclude osteomyelitis.  - 10/30 wound care note: Sacral stage 4 pressure injury 4.5cm x 2.5cm x 1.5cm w/ lip of undermining circumferentially greatest 9-12 of 3cm.  - MRI pelvis 10/30 with Osteomyelitis, c/w current Cefepime for 7 days, Vancomycin d/marli   - Elevated, ESR 85   - Elevated,    - Wound care on board  - 11/10: resumed Cipro 500mg q 12 and Keflex 500mg q 6 until 12/10.  - 11/20: Changed to IV Cipro 400 q12 as recommended by ID pharmacist due to multiple drug interactions when given via PEG  - 11/28 wound care note: Sacral stage 4pressure injury 4.5cm x 2.5cm x 0.5cm, moist granular wound bed   palpable but not exposed bone no blistering, (+) serosanguinous drainage. No odor, erythema, increased warmth, tenderness, induration, fluctuance, nor crepitus.  - 12/3 wound culture ordered - d/c'd as no need for culture, wound improving, no signs of infection

## 2023-12-06 NOTE — PROGRESS NOTE ADULT - SUBJECTIVE AND OBJECTIVE BOX
INTERVAL HPI/OVERNIGHT EVENTS:  Patient seen and examined. Tolerating feeds at 60cc/hour  no reported rectal bleeding.     MEDICATIONS  (STANDING):  albuterol/ipratropium for Nebulization 3 milliLiter(s) Nebulizer every 6 hours  amLODIPine   Tablet 10 milliGRAM(s) Oral daily  artificial  tears Solution 2 Drop(s) Both EYES four times a day  ascorbic acid 500 milliGRAM(s) Oral daily  calcium carbonate    500 mG (Tums) Chewable 1 Tablet(s) Chew every 12 hours  cephalexin 500 milliGRAM(s) Oral every 6 hours  chlorhexidine 0.12% Liquid 15 milliLiter(s) Oral Mucosa every 12 hours  chlorhexidine 4% Liquid 1 Application(s) Topical <User Schedule>  ciprofloxacin   IVPB 400 milliGRAM(s) IV Intermittent every 12 hours  dextrose 50% Injectable 12.5 Gram(s) IV Push once  dextrose 50% Injectable 25 Gram(s) IV Push once  dextrose 50% Injectable 25 Gram(s) IV Push once  dextrose 50% Injectable 50 milliLiter(s) IV Push once  dextrose Oral Gel 15 Gram(s) Oral once  escitalopram 10 milliGRAM(s) Oral <User Schedule>  fentaNYL   Patch  25 MICROgram(s)/Hr 1 Patch Transdermal every 72 hours  gabapentin Solution 100 milliGRAM(s) Enteral Tube at bedtime  glucagon  Injectable 1 milliGRAM(s) IntraMuscular once  hemorrhoidal Ointment 1 Application(s) Rectal daily  hemorrhoidal Ointment      insulin glargine Injectable (LANTUS) 20 Unit(s) SubCutaneous every morning  insulin lispro (ADMELOG) corrective regimen sliding scale   SubCutaneous every 6 hours  insulin regular  human recombinant 11 Unit(s) SubCutaneous <User Schedule>  insulin regular  human recombinant 22 Unit(s) SubCutaneous <User Schedule>  levothyroxine 125 MICROGram(s) Oral <User Schedule>  lidocaine   4% Patch 1 Patch Transdermal daily  melatonin 1 milliGRAM(s) Oral at bedtime  melatonin 5 milliGRAM(s) Oral at bedtime  multivitamin/minerals/iron Oral Solution (CENTRUM) 15 milliLiter(s) Oral daily  nystatin Powder 1 Application(s) Topical every 8 hours  pantoprazole   Suspension 40 milliGRAM(s) Enteral Tube <User Schedule>  petrolatum Ophthalmic Ointment 1 Application(s) Both EYES four times a day  predniSONE   Tablet 5 milliGRAM(s) Oral daily  QUEtiapine 12.5 milliGRAM(s) Oral <User Schedule>  simethicone 80 milliGRAM(s) Chew four times a day  sodium chloride 3%  Inhalation 4 milliLiter(s) Inhalation every 12 hours  zinc oxide 40% Paste 1 Application(s) Topical daily    MEDICATIONS  (PRN):  acetaminophen   Oral Liquid .. 1000 milliGRAM(s) Enteral Tube every 6 hours PRN Temp greater or equal to 38C (100.4F), Mild Pain (1 - 3)  benzocaine 20% Spray 1 Spray(s) Topical four times a day PRN Cough  diclofenac sodium 1% Gel 2 Gram(s) Topical four times a day PRN pain  diphenhydrAMINE Elixir 25 milliGRAM(s) Oral every 6 hours PRN Rash and/or Itching  guaifenesin/dextromethorphan Oral Liquid 10 milliLiter(s) Oral every 6 hours PRN Cough  oxyCODONE    Solution 2.5 milliGRAM(s) Oral every 6 hours PRN Moderate Pain (4 - 6)  sodium chloride 0.65% Nasal 1 Spray(s) Both Nostrils every 6 hours PRN Nasal Congestion      Allergies    penicillin (Unknown)  heparin (Unknown)  Tagamet (Unknown)  pineapple (Unknown)  walnut (Unknown)  Pecan, Filbert, Hazelnut (Unknown)  Lyrica (Unknown)    Intolerances        Review of Systems:    unable to obtain      Vital Signs Last 24 Hrs  T(C): 36.6 (06 Dec 2023 11:32), Max: 37.2 (05 Dec 2023 17:56)  T(F): 97.9 (06 Dec 2023 11:32), Max: 98.9 (05 Dec 2023 17:56)  HR: 86 (06 Dec 2023 11:32) (83 - 99)  BP: 112/50 (06 Dec 2023 11:32) (112/50 - 149/74)  BP(mean): --  RR: 20 (06 Dec 2023 11:32) (20 - 20)  SpO2: 100% (06 Dec 2023 11:32) (98% - 100%)    Parameters below as of 06 Dec 2023 11:32  Patient On (Oxygen Delivery Method): ventilator        PHYSICAL EXAM:  GENERAL:   no distress  HEENT:  NC/AT,  conjunctivae anicteric, clear and pink, trach  NECK: supple, trachea midline  CHEST:  Full & symmetric excursion, no increased effort, breath sounds clear  HEART:  Regular rhythm, no JVD  ABDOMEN:  Soft, non-tender, non-distended, normoactive bowel sounds,  no masses , no hepatosplenomegaly, G tube c/d/i  EXTREMITIES:  no cyanosis, clubbing or edema  SKIN:  No rash, erythema, or, ecchymoses, no jaundice    LABS:                        9.8    6.87  )-----------( 127      ( 06 Dec 2023 06:32 )             31.8     12-06    138  |  100  |  15  ----------------------------<  147<H>  3.6   |  28  |  <0.30<L>    Ca    9.7      06 Dec 2023 06:33  Phos  4.5     12-06  Mg     1.7     12-06        Urinalysis Basic - ( 06 Dec 2023 06:33 )    Color: x / Appearance: x / SG: x / pH: x  Gluc: 147 mg/dL / Ketone: x  / Bili: x / Urobili: x   Blood: x / Protein: x / Nitrite: x   Leuk Esterase: x / RBC: x / WBC x   Sq Epi: x / Non Sq Epi: x / Bacteria: x        RADIOLOGY & ADDITIONAL TESTS:

## 2023-12-06 NOTE — CHART NOTE - NSCHARTNOTEFT_GEN_A_CORE
Nutrition Chart Note  Spoke with [place] Felder, daughter would like to speak to RD regarding tube feeding  RD called pt daughter Marysol Woods ([phone]). see below.     Diet, NPO with Tube Feed:   Tube Feeding Modality: Gastrostomy  Cas[employer] Kar[employer] glucose support 1.2  Total Volume for 24 Hours (mL): 1200  Continuous  Starting Tube Feed Rate {mL per Hour}: 60  Increase Tube Feed Rate by (mL): 0  Until Goal Tube Feed Rate (mL per Hour): 60  Tube Feed Duration (in Hours): 20  Tube Feed Start Time: 06:00  Tube Feed Stop Time: 02:00  Free Water Flush  Pump   Rate (mL per Hour): 10   Frequency: Every 2 Hours  Alfred(7 Gm Arginine/7 Gm Glut/1.2 Gm HMB     Qty per Day:  2 (11-29-23 @ 14:12) [Active]    EN order providing if 100% provision: 1440kcal (30kcal/kg) and 77g protein (1.6g/kg)    Skin: sacrum stage IV per flow sheets    IBW+10% 48kg  Estimated Needs using IBW + 10% considering increased needs   27-32kcal/kg 1296-1536kcal/day  1.3-1.6g/kg 62-77g protein/day  defer fluid needs to team    MEDICATIONS  (STANDING):  amLODIPine   Tablet  ascorbic acid  calcium carbonate    500 mG (Tums) Chewable  cephalexin  ciprofloxacin   IVPB  dextrose 50% Injectable  dextrose 50% Injectable  dextrose 50% Injectable  dextrose 50% Injectable  dextrose Oral Gel  glucagon  Injectable  insulin glargine Injectable (LANTUS)  insulin lispro (ADMELOG) corrective regimen sliding scale  insulin regular  human recombinant  insulin regular  human recombinant  levothyroxine  multivitamin/minerals/iron Oral Solution (CENTRUM)  pantoprazole   Suspension  predniSONE   Tablet  simethicone    Pertinent Labs: 12-06 @ 06:33: Na 138, BUN 15, Cr <0.30<L>, <H>, K+ 3.6, Phos 4.5, Mg 1.7, Alk Phos --, ALT/SGPT --, AST/SGOT --, HbA1c --    A1C with Estimated Average Glucose Result: 7.5 % (10-28-23 @ 07:18)    Finger Sticks:  POCT Blood Glucose.: 180 mg/dL (12-06 @ 11:28)  POCT Blood Glucose.: 151 mg/dL (12-06 @ 06:15)  POCT Blood Glucose.: 106 mg/dL (12-06 @ 00:01)  POCT Blood Glucose.: 153 mg/dL (12-05 @ 17:31)    Triglycerides, Serum: 192 mg/dL (11-25-23 @ 07:05)    Spoke to pt daughter. explained calories and protein provided by current tube feeding regimen. explained pt receiving adequate protein and calories via [employer] glucose support 1200ml total volume as ordered. explained the current vitamin/mineral regimen ordered. Vitamin C and Multivitamin are beneficial for wound healing. continue on liquid Multivitamin, continue per daughter request. relayed message to [place] case management.     Rufina Morris, MS, RD, CDN / Teams Nutrition Chart Note  Spoke with Chula Felder, daughter would like to speak to RD regarding tube feeding  RD called pt daughter Marysol Woods ([phone]). see below.     Diet, NPO with Tube Feed:   Tube Feeding Modality: Gastrostomy  Casie Karin glucose support 1.2  Total Volume for 24 Hours (mL): 1200  Continuous  Starting Tube Feed Rate {mL per Hour}: 60  Increase Tube Feed Rate by (mL): 0  Until Goal Tube Feed Rate (mL per Hour): 60  Tube Feed Duration (in Hours): 20  Tube Feed Start Time: 06:00  Tube Feed Stop Time: 02:00  Free Water Flush  Pump   Rate (mL per Hour): 10   Frequency: Every 2 Hours  Alfred(7 Gm Arginine/7 Gm Glut/1.2 Gm HMB     Qty per Day:  2 (11-29-23 @ 14:12) [Active]    EN order providing if 100% provision: 1440kcal (30kcal/kg) and 77g protein (1.6g/kg)    Skin: sacrum stage IV per flow sheets    IBW+10% 48kg  Estimated Needs using IBW + 10% considering increased needs   27-32kcal/kg 1296-1536kcal/day  1.3-1.6g/kg 62-77g protein/day  defer fluid needs to team    MEDICATIONS  (STANDING):  amLODIPine   Tablet  ascorbic acid  calcium carbonate    500 mG (Tums) Chewable  cephalexin  ciprofloxacin   IVPB  dextrose 50% Injectable  dextrose 50% Injectable  dextrose 50% Injectable  dextrose 50% Injectable  dextrose Oral Gel  glucagon  Injectable  insulin glargine Injectable (LANTUS)  insulin lispro (ADMELOG) corrective regimen sliding scale  insulin regular  human recombinant  insulin regular  human recombinant  levothyroxine  multivitamin/minerals/iron Oral Solution (CENTRUM)  pantoprazole   Suspension  predniSONE   Tablet  simethicone    Pertinent Labs: 12-06 @ 06:33: Na 138, BUN 15, Cr <0.30<L>, <H>, K+ 3.6, Phos 4.5, Mg 1.7, Alk Phos --, ALT/SGPT --, AST/SGOT --, HbA1c --    A1C with Estimated Average Glucose Result: 7.5 % (10-28-23 @ 07:18)    Finger Sticks:  POCT Blood Glucose.: 180 mg/dL (12-06 @ 11:28)  POCT Blood Glucose.: 151 mg/dL (12-06 @ 06:15)  POCT Blood Glucose.: 106 mg/dL (12-06 @ 00:01)  POCT Blood Glucose.: 153 mg/dL (12-05 @ 17:31)    Triglycerides, Serum: 192 mg/dL (11-25-23 @ 07:05)    Spoke to pt daughter. explained calories and protein provided by current tube feeding regimen. explained pt receiving adequate protein and calories via [employer] glucose support 1200ml total volume as ordered. explained the current vitamin/mineral regimen ordered. Vitamin C and Multivitamin are beneficial for wound healing. continue on liquid Multivitamin, continue per daughter request. relayed message to [place] case management.     Rufina Morris, MS, RD, CDN / Teams Nutrition Chart Note  Spoke with [place] Felder, daughter would like to speak to RD regarding tube feeding  RD called pt daughter Marysol Woods ([phone]). see below.     Diet, NPO with Tube Feed:   Tube Feeding Modality: Gastrostomy  Casie Karin glucose support 1.2  Total Volume for 24 Hours (mL): 1200  Continuous  Starting Tube Feed Rate {mL per Hour}: 60  Increase Tube Feed Rate by (mL): 0  Until Goal Tube Feed Rate (mL per Hour): 60  Tube Feed Duration (in Hours): 20  Tube Feed Start Time: 06:00  Tube Feed Stop Time: 02:00  Free Water Flush  Pump   Rate (mL per Hour): 10   Frequency: Every 2 Hours  Alfred(7 Gm Arginine/7 Gm Glut/1.2 Gm HMB     Qty per Day:  2 (11-29-23 @ 14:12) [Active]    EN order providing if 100% provision: 1440kcal (30kcal/kg) and 77g protein (1.6g/kg)    Skin: sacrum stage IV per flow sheets    IBW+10% 48kg  Estimated Needs using IBW + 10% considering increased needs   27-32kcal/kg 1296-1536kcal/day  1.3-1.6g/kg 62-77g protein/day  defer fluid needs to team    MEDICATIONS  (STANDING):  amLODIPine   Tablet  ascorbic acid  calcium carbonate    500 mG (Tums) Chewable  cephalexin  ciprofloxacin   IVPB  dextrose 50% Injectable  dextrose 50% Injectable  dextrose 50% Injectable  dextrose 50% Injectable  dextrose Oral Gel  glucagon  Injectable  insulin glargine Injectable (LANTUS)  insulin lispro (ADMELOG) corrective regimen sliding scale  insulin regular  human recombinant  insulin regular  human recombinant  levothyroxine  multivitamin/minerals/iron Oral Solution (CENTRUM)  pantoprazole   Suspension  predniSONE   Tablet  simethicone    Pertinent Labs: 12-06 @ 06:33: Na 138, BUN 15, Cr <0.30<L>, <H>, K+ 3.6, Phos 4.5, Mg 1.7, Alk Phos --, ALT/SGPT --, AST/SGOT --, HbA1c --    A1C with Estimated Average Glucose Result: 7.5 % (10-28-23 @ 07:18)    Finger Sticks:  POCT Blood Glucose.: 180 mg/dL (12-06 @ 11:28)  POCT Blood Glucose.: 151 mg/dL (12-06 @ 06:15)  POCT Blood Glucose.: 106 mg/dL (12-06 @ 00:01)  POCT Blood Glucose.: 153 mg/dL (12-05 @ 17:31)    Triglycerides, Serum: 192 mg/dL (11-25-23 @ 07:05)    Spoke to pt daughter. explained calories and protein provided by current tube feeding regimen. explained pt receiving adequate protein and calories via [employer] glucose support 1200ml total volume as ordered. explained the current vitamin/mineral regimen ordered. Vitamin C and Multivitamin are beneficial for wound healing. continue on liquid Multivitamin, continue per daughter request. Continue 20 hour regimen to allow time for synthroid administration. Pt with DM2 and history of [employer] PTA, [employer] glucose support is the preferred EN regimen at this time. Continue Alfred twice/day to aid in wound healing.   relayed message to [place] case management.     Rufina Morris, MS, RD, CDN / Teams Nutrition Chart Note  Spoke with [place] Felder, daughter would like to speak to RD regarding tube feeding  RD called pt daughter Marysol Woods ([phone]). see below.     Diet, NPO with Tube Feed:   Tube Feeding Modality: Gastrostomy  Casie Karin glucose support 1.2  Total Volume for 24 Hours (mL): 1200  Continuous  Starting Tube Feed Rate {mL per Hour}: 60  Increase Tube Feed Rate by (mL): 0  Until Goal Tube Feed Rate (mL per Hour): 60  Tube Feed Duration (in Hours): 20  Tube Feed Start Time: 06:00  Tube Feed Stop Time: 02:00  Free Water Flush  Pump   Rate (mL per Hour): 10   Frequency: Every 2 Hours  Alfred(7 Gm Arginine/7 Gm Glut/1.2 Gm HMB     Qty per Day:  2 (11-29-23 @ 14:12) [Active]    EN order providing if 100% provision: 1440kcal (30kcal/kg) and 77g protein (1.6g/kg)    Skin: sacrum stage IV per flow sheets    IBW+10% 48kg  Estimated Needs using IBW + 10% considering increased needs   27-32kcal/kg 1296-1536kcal/day  1.3-1.6g/kg 62-77g protein/day  defer fluid needs to team    MEDICATIONS  (STANDING):  amLODIPine   Tablet  ascorbic acid  calcium carbonate    500 mG (Tums) Chewable  cephalexin  ciprofloxacin   IVPB  dextrose 50% Injectable  dextrose 50% Injectable  dextrose 50% Injectable  dextrose 50% Injectable  dextrose Oral Gel  glucagon  Injectable  insulin glargine Injectable (LANTUS)  insulin lispro (ADMELOG) corrective regimen sliding scale  insulin regular  human recombinant  insulin regular  human recombinant  levothyroxine  multivitamin/minerals/iron Oral Solution (CENTRUM)  pantoprazole   Suspension  predniSONE   Tablet  simethicone    Pertinent Labs: 12-06 @ 06:33: Na 138, BUN 15, Cr <0.30<L>, <H>, K+ 3.6, Phos 4.5, Mg 1.7, Alk Phos --, ALT/SGPT --, AST/SGOT --, HbA1c --    A1C with Estimated Average Glucose Result: 7.5 % (10-28-23 @ 07:18)    Finger Sticks:  POCT Blood Glucose.: 180 mg/dL (12-06 @ 11:28)  POCT Blood Glucose.: 151 mg/dL (12-06 @ 06:15)  POCT Blood Glucose.: 106 mg/dL (12-06 @ 00:01)  POCT Blood Glucose.: 153 mg/dL (12-05 @ 17:31)    Triglycerides, Serum: 192 mg/dL (11-25-23 @ 07:05)    Spoke to pt daughter. explained calories and protein provided by current tube feeding regimen. explained pt receiving adequate protein and calories via [employer] glucose support 1200ml total volume as ordered. explained the current vitamin/mineral regimen ordered. Vitamin C and Multivitamin are beneficial for wound healing. continue on liquid Multivitamin, continue per daughter request. Continue 20 hour regimen to allow time for synthroid administration. Pt with DM2 and history of [employer] PTA, [employer] glucose support is the preferred EN regimen at this time. Continue Alfred twice/day to aid in wound healing.   relayed message to [place] case management.     Rufina Morris, MS, RD, CDN / Teams Nutrition Chart Note  Spoke with [place]ula Felder, daughter would like to speak to RD regarding tube feeding  RD called pt daughter Marysol Woods ([phone]). see below.     Diet, NPO with Tube Feed:   Tube Feeding Modality: Gastrostomy  Casie Karin glucose support 1.2  Total Volume for 24 Hours (mL): 1200  Continuous  Starting Tube Feed Rate {mL per Hour}: 60  Increase Tube Feed Rate by (mL): 0  Until Goal Tube Feed Rate (mL per Hour): 60  Tube Feed Duration (in Hours): 20  Tube Feed Start Time: 06:00  Tube Feed Stop Time: 02:00  Free Water Flush  Pump   Rate (mL per Hour): 10   Frequency: Every 2 Hours  Alfred(7 Gm Arginine/7 Gm Glut/1.2 Gm HMB     Qty per Day:  2 (11-29-23 @ 14:12) [Active]    EN order providing if 100% provision: 1440kcal (30kcal/kg) and 77g protein (1.6g/kg)    Skin: sacrum stage IV per flow sheets    IBW+10% 48kg  Estimated Needs using IBW + 10% considering increased needs   27-32kcal/kg 1296-1536kcal/day  1.3-1.6g/kg 62-77g protein/day  defer fluid needs to team    MEDICATIONS  (STANDING):  amLODIPine   Tablet  ascorbic acid  calcium carbonate    500 mG (Tums) Chewable  cephalexin  ciprofloxacin   IVPB  dextrose 50% Injectable  dextrose 50% Injectable  dextrose 50% Injectable  dextrose 50% Injectable  dextrose Oral Gel  glucagon  Injectable  insulin glargine Injectable (LANTUS)  insulin lispro (ADMELOG) corrective regimen sliding scale  insulin regular  human recombinant  insulin regular  human recombinant  levothyroxine  multivitamin/minerals/iron Oral Solution (CENTRUM)  pantoprazole   Suspension  predniSONE   Tablet  simethicone    Pertinent Labs: 12-06 @ 06:33: Na 138, BUN 15, Cr <0.30<L>, <H>, K+ 3.6, Phos 4.5, Mg 1.7, Alk Phos --, ALT/SGPT --, AST/SGOT --, HbA1c --    A1C with Estimated Average Glucose Result: 7.5 % (10-28-23 @ 07:18)    Finger Sticks:  POCT Blood Glucose.: 180 mg/dL (12-06 @ 11:28)  POCT Blood Glucose.: 151 mg/dL (12-06 @ 06:15)  POCT Blood Glucose.: 106 mg/dL (12-06 @ 00:01)  POCT Blood Glucose.: 153 mg/dL (12-05 @ 17:31)    Triglycerides, Serum: 192 mg/dL (11-25-23 @ 07:05)    Spoke to pt daughter. explained calories and protein provided by current tube feeding regimen. explained pt receiving adequate protein and calories via [employer] glucose support 1200ml total volume as ordered. explained the current vitamin/mineral regimen ordered. Vitamin C and Multivitamin are beneficial for wound healing. continue on liquid Multivitamin, continue per daughter request. Continue 20 hour regimen to allow time for synthroid administration. Pt with DM2 and history of [employer] PTA, [employer] glucose support is the preferred EN regimen at this time. Continue Alfred twice/day to aid in wound healing.   relayed message to [place] case management.   defer fluid needs to team.    Rufina Morris, MS, RD, CDN / Teams Nutrition Chart Note  Spoke with Chula Felder, daughter would like to speak to RD regarding tube feeding  RD called pt daughter Marysol Woods ([phone]). see below.     Diet, NPO with Tube Feed:   Tube Feeding Modality: Gastrostomy  Casie Karin glucose support 1.2  Total Volume for 24 Hours (mL): 1200  Continuous  Starting Tube Feed Rate {mL per Hour}: 60  Increase Tube Feed Rate by (mL): 0  Until Goal Tube Feed Rate (mL per Hour): 60  Tube Feed Duration (in Hours): 20  Tube Feed Start Time: 06:00  Tube Feed Stop Time: 02:00  Free Water Flush  Pump   Rate (mL per Hour): 10   Frequency: Every 2 Hours  Alfred(7 Gm Arginine/7 Gm Glut/1.2 Gm HMB     Qty per Day:  2 (11-29-23 @ 14:12) [Active]    EN order providing if 100% provision: 1440kcal (30kcal/kg) and 77g protein (1.6g/kg)    Skin: sacrum stage IV per flow sheets    IBW+10% 48kg  Estimated Needs using IBW + 10% considering increased needs   27-32kcal/kg 1296-1536kcal/day  1.3-1.6g/kg 62-77g protein/day  defer fluid needs to team    MEDICATIONS  (STANDING):  amLODIPine   Tablet  ascorbic acid  calcium carbonate    500 mG (Tums) Chewable  cephalexin  ciprofloxacin   IVPB  dextrose 50% Injectable  dextrose 50% Injectable  dextrose 50% Injectable  dextrose 50% Injectable  dextrose Oral Gel  glucagon  Injectable  insulin glargine Injectable (LANTUS)  insulin lispro (ADMELOG) corrective regimen sliding scale  insulin regular  human recombinant  insulin regular  human recombinant  levothyroxine  multivitamin/minerals/iron Oral Solution (CENTRUM)  pantoprazole   Suspension  predniSONE   Tablet  simethicone    Pertinent Labs: 12-06 @ 06:33: Na 138, BUN 15, Cr <0.30<L>, <H>, K+ 3.6, Phos 4.5, Mg 1.7, Alk Phos --, ALT/SGPT --, AST/SGOT --, HbA1c --    A1C with Estimated Average Glucose Result: 7.5 % (10-28-23 @ 07:18)    Finger Sticks:  POCT Blood Glucose.: 180 mg/dL (12-06 @ 11:28)  POCT Blood Glucose.: 151 mg/dL (12-06 @ 06:15)  POCT Blood Glucose.: 106 mg/dL (12-06 @ 00:01)  POCT Blood Glucose.: 153 mg/dL (12-05 @ 17:31)    Triglycerides, Serum: 192 mg/dL (11-25-23 @ 07:05)    Spoke to pt daughter. explained calories and protein provided by current tube feeding regimen. explained pt receiving adequate protein and calories via [employer] glucose support 1200ml total volume as ordered. explained the current vitamin/mineral regimen ordered. Vitamin C and Multivitamin are beneficial for wound healing. continue on liquid Multivitamin, continue per daughter request. Continue 20 hour regimen to allow time for synthroid administration. Pt with DM2 and history of [employer] PTA, [employer] glucose support is the preferred EN regimen at this time. Continue Alfred twice/day to aid in wound healing.   relayed message to [place] case management.   defer fluid needs to team.    Rufina Morris, MS, RD, CDN / Teams

## 2023-12-06 NOTE — SPEAKING VALVE EVALUATION - OBSERVATIONS
SLP held discussion with PRITI Delarosa regarding In Line PMV order; D/w RT David and Yuliet regarding scheduling; plan for 12/7 10am pt status permitting. Discussed with JUAREZ Dolan possible plan for d/c home end of this week if able to get nursing services. NP discussed with daughter regarding if wish to pursue IN LINE PMV during hospital course; daughter wishes to attempt. SLP held discussion with PRITI Delarosa regarding In Line PMV order; D/w RT Patricia regarding scheduling; plan for 12/7 10am pt status permitting. Discussed with JUAREZ Dolan possible plan for d/c home end of this week if able to get nursing services. NP discussed with daughter regarding if wish to pursue IN LINE PMV during hospital course; daughter wishes to attempt. D/w RT regarding CPAP trialing and current settings; did not tolerate CPAP, plan to remain on full vent support. No plan for trach collar.

## 2023-12-06 NOTE — SPEAKING VALVE EVALUATION - SPECIFY REASON(S)
to assess candidate for PMV IN LINE during hospital course to assess candidacy for PMV IN LINE during hospital course

## 2023-12-06 NOTE — PROGRESS NOTE ADULT - NS ATTEND AMEND GEN_ALL_CORE FT
73F with a h/o DM, HTN, HLD, anemia, hypothyroidism, RA, fibromyalgia, remote cerebral aneurysm with repair, hypercapnic respiratory failure s/p tracheostomy/PEG, bedbound, sacral pressure ulcer. Admitted w/ bleeding from tracheostomy and PEG w/ associated abdominal pain, recent hx of auditory/visual hallucinations. Since admission, no further bleeding noted from either trach or PEG. Imaging showed mild thickening along PEG tube tract and sacral ulcer extending to coccyx suggestive of OM.     # Osteomyelitis  # Chronic hypoxemic RF  # oropharyngeal dysphagia  # acute on chronic pain  # Trach/Peg bleeding  # Tracheitis with Pseudomonas and serratia  # Hx of hallucination, anxiety - particularly in setting of prior antibiotics    #Neuro - awake, alert, and interactive. moving all extremities  - Patient presently calm and following commands appropriately. Continue current medications  - Behavioral Health Follow-up as needed   - c/w Melatonin 6mg at bedtime for sleep   #Cardiovascular - hemodynamically stable  #Pulmonary - chronic hypoxemic respiratory failure, has 8XLT trach, on home vent, c/w PSV as able though still requiring high support  #Gastro - oropharyngeal dysphagia with PEG tube in place, tolerating, nutrition follow up appreciated   - Abdominal pain- improved today as she is voiding, no significant urinary retention, cont to hold Oxybutinin  Abdominal sono 12/1 and CT abdomen 12/4- No acute intra-abdominal findings.   - History of c. diff previously treated per daughter - normal BMS, if diarrhea recurs off stool softeners will need to repeat testing for this.   #Infectious Disease - sacral osteo on MRI - wound care follow-up appreciated, c/w offloading and local wound care  - On review of wound care notes there does appear to be some improvement in at least one dimension of wound from admission measurements. The patient has had this wound since a hospitalization in December 2022 and per daughter does not have chronic or multiple wounds but only this single wound.   - C/w 6 week course of Cipro and Keflex as per ID - (to complete approx 12/10).   - Per daughter, patient has had prior multiple infections during hospitalizations including prior pseudomonas, MRSA, E faecalis, and Klebsiella  - Sputum colonized with MDR Pseudomonas   - Daughter reports a recent dental abscess and being told by outpatient dentist that patient needed teeth extracted - Dental evaluation noted with no plans for intervention as inpatient.   #Hem/Onc - prior cytopenias in setting of antibiotics per patient's daughter   - Hem/Onc follow-up noted - suspect an anemia of chronic disease  #Pain - continue current pain control - in setting of fibromyalgia and pain from wound  - Voltaren topical, lidocaine patches and low dose Oxycodone as needed  #Derm - previously seen by derm for skin lesions - mid back with irritated seborrheic keratosis - outpatient follow-up  #Endo - DM2 - continue insulin and adjust as needed for goal FS < 180  - Endocrine follow-up appreciate   - Continue Synthroid - TSH WNL  #DVT proph - SCDs  - Prior prophylactic AC stopped after concern for prior bleeding 73F with a h/o DM, HTN, HLD, anemia, hypothyroidism, RA, fibromyalgia, remote cerebral aneurysm with repair, hypercapnic respiratory failure s/p tracheostomy/PEG, bedbound, sacral pressure ulcer. Admitted w/ bleeding from tracheostomy and PEG w/ associated abdominal pain, recent hx of auditory/visual hallucinations. Since admission, no further bleeding noted from either trach or PEG. Imaging showed mild thickening along PEG tube tract and sacral ulcer extending to coccyx suggestive of OM.     # Osteomyelitis  # Chronic hypoxemic RF  # oropharyngeal dysphagia  # acute on chronic pain  # Trach/Peg bleeding  # Tracheitis with Pseudomonas and serratia  # Hx of hallucination, anxiety - particularly in setting of prior antibiotics    #Neuro - awake, alert, and interactive. moving all extremities  - Patient presently calm and following commands appropriately. Continue current medications  - Behavioral Health Follow-up as needed   - c/w Melatonin 6mg at bedtime for sleep   #Cardiovascular - hemodynamically stable  #Pulmonary - chronic hypoxemic respiratory failure, has 8XLT trach, on home vent, c/w PSV as able though still requiring high support  #Gastro - oropharyngeal dysphagia with PEG tube in place, tolerating, nutrition follow up appreciated   - Abdominal pain- improved today as she is voiding, no significant urinary retention, cont to hold Oxybutinin  Abdominal sono 12/1 and CT abdomen 12/4- No acute intra-abdominal findings.   - History of c. diff previously treated per daughter - normal BMS, if diarrhea recurs off stool softeners will need to repeat testing for this.   #Infectious Disease - sacral osteo on MRI - wound care follow-up appreciated, c/w offloading and local wound care  - On review of wound care notes there does appear to be some improvement in at least one dimension of wound from admission measurements. The patient has had this wound since a hospitalization in December 2022 and per daughter does not have chronic or multiple wounds but only this single wound.   - C/w 6 week course of Cipro and Keflex as per ID - (to complete approx 12/10).   - Per daughter, patient has had prior multiple infections during hospitalizations including prior pseudomonas, MRSA, E faecalis, and Klebsiella  - Sputum colonized with MDR Pseudomonas   - Daughter reports a recent dental abscess and being told by outpatient dentist that patient needed teeth extracted - Dental evaluation noted with no plans for intervention as inpatient.   #Hem/Onc - prior cytopenias in setting of antibiotics per patient's daughter   - Hem/Onc follow-up noted - suspect an anemia of chronic disease  #Pain - continue current pain control - in setting of fibromyalgia and pain from wound  - Voltaren topical, lidocaine patches and low dose Oxycodone as needed  #Derm - previously seen by derm for skin lesions - mid back with irritated seborrheic keratosis - outpatient follow-up  #Endo - DM2 - continue insulin and adjust as needed for goal FS < 180  - Endocrine follow-up appreciate   - Continue Synthroid - TSH WNL  #DVT proph - SCDs  - Prior prophylactic AC stopped after concern for prior bleeding  Dispo - Full code, patient is medically stable for discharge. Discharge planning ongoing

## 2023-12-06 NOTE — PROGRESS NOTE ADULT - SUBJECTIVE AND OBJECTIVE BOX
Patient is a 73y old  Female who presents with a chief complaint of bleeding (04 Dec 2023 16:57)      Interval Events:    REVIEW OF SYSTEMS:  [ ] Positive  [ ] All other systems negative  [ ] Unable to assess ROS because ________    Vital Signs Last 24 Hrs  T(C): 36.9 (12-06-23 @ 06:15), Max: 37.2 (12-05-23 @ 17:56)  T(F): 98.4 (12-06-23 @ 06:15), Max: 98.9 (12-05-23 @ 17:56)  HR: 83 (12-06-23 @ 06:15) (83 - 99)  BP: 133/71 (12-06-23 @ 06:15) (124/69 - 149/74)  RR: 20 (12-06-23 @ 06:15) (20 - 20)  SpO2: 100% (12-06-23 @ 06:15) (98% - 100%)    PHYSICAL EXAM:  HEENT:   [ ]Tracheostomy:  [ ]Pupils equal  [ ]No oral lesions  [ ]Abnormal    SKIN  [ ]No Rash  [ ] Abnormal  [ ] pressure    CARDIAC  [ ]Regular  [ ]Abnormal    PULMONARY  [ ]Bilateral Clear Breath Sounds  [ ]Normal Excursion  [ ]Abnormal    GI  [ ]PEG      [ ] +BS		              [ ]Soft, nondistended, nontender	  [ ]Abnormal    MUSCULOSKELETAL                                   [ ]Bedbound                 [ ]Abnormal    [ ]Ambulatory/OOB to chair                           EXTREMITIES                                         [ ]Normal  [ ]Edema                           NEUROLOGIC  [ ] Normal, non focal  [ ] Focal findings:    PSYCHIATRIC  [ ]Alert and appropriate  [ ] Sedated	 [ ]Agitated    :  Mcbride: [ ] Yes, if yes: Date of Placement:                   [  ] No    LINES: Central Lines [ ] Yes, if yes: Date of Placement                                     [  ] No    HOSPITAL MEDICATIONS:  MEDICATIONS  (STANDING):  albuterol/ipratropium for Nebulization 3 milliLiter(s) Nebulizer every 6 hours  amLODIPine   Tablet 10 milliGRAM(s) Oral daily  artificial  tears Solution 2 Drop(s) Both EYES four times a day  ascorbic acid 500 milliGRAM(s) Oral daily  calcium carbonate    500 mG (Tums) Chewable 1 Tablet(s) Chew every 12 hours  cephalexin 500 milliGRAM(s) Oral every 6 hours  chlorhexidine 0.12% Liquid 15 milliLiter(s) Oral Mucosa every 12 hours  chlorhexidine 4% Liquid 1 Application(s) Topical <User Schedule>  ciprofloxacin   IVPB 400 milliGRAM(s) IV Intermittent every 12 hours  dextrose 50% Injectable 25 Gram(s) IV Push once  dextrose 50% Injectable 12.5 Gram(s) IV Push once  dextrose 50% Injectable 50 milliLiter(s) IV Push once  dextrose 50% Injectable 25 Gram(s) IV Push once  dextrose Oral Gel 15 Gram(s) Oral once  escitalopram 10 milliGRAM(s) Oral <User Schedule>  gabapentin Solution 100 milliGRAM(s) Enteral Tube at bedtime  glucagon  Injectable 1 milliGRAM(s) IntraMuscular once  hemorrhoidal Ointment      hemorrhoidal Ointment 1 Application(s) Rectal daily  insulin glargine Injectable (LANTUS) 20 Unit(s) SubCutaneous every morning  insulin lispro (ADMELOG) corrective regimen sliding scale   SubCutaneous every 6 hours  insulin regular  human recombinant 22 Unit(s) SubCutaneous <User Schedule>  insulin regular  human recombinant 11 Unit(s) SubCutaneous <User Schedule>  levothyroxine 125 MICROGram(s) Oral <User Schedule>  lidocaine   4% Patch 1 Patch Transdermal daily  melatonin 5 milliGRAM(s) Oral at bedtime  melatonin 1 milliGRAM(s) Oral at bedtime  multivitamin/minerals/iron Oral Solution (CENTRUM) 15 milliLiter(s) Oral daily  nystatin Powder 1 Application(s) Topical every 8 hours  pantoprazole   Suspension 40 milliGRAM(s) Enteral Tube <User Schedule>  petrolatum Ophthalmic Ointment 1 Application(s) Both EYES four times a day  predniSONE   Tablet 5 milliGRAM(s) Oral daily  QUEtiapine 12.5 milliGRAM(s) Oral <User Schedule>  simethicone 80 milliGRAM(s) Chew four times a day  sodium chloride 3%  Inhalation 4 milliLiter(s) Inhalation every 12 hours  zinc oxide 40% Paste 1 Application(s) Topical daily    MEDICATIONS  (PRN):  acetaminophen   Oral Liquid .. 1000 milliGRAM(s) Enteral Tube every 6 hours PRN Temp greater or equal to 38C (100.4F), Mild Pain (1 - 3)  benzocaine 20% Spray 1 Spray(s) Topical four times a day PRN Cough  diclofenac sodium 1% Gel 2 Gram(s) Topical four times a day PRN pain  diphenhydrAMINE Elixir 25 milliGRAM(s) Oral every 6 hours PRN Rash and/or Itching  guaifenesin/dextromethorphan Oral Liquid 10 milliLiter(s) Oral every 6 hours PRN Cough  oxyCODONE    Solution 2.5 milliGRAM(s) Oral every 6 hours PRN Moderate Pain (4 - 6)  sodium chloride 0.65% Nasal 1 Spray(s) Both Nostrils every 6 hours PRN Nasal Congestion      LABS:                        9.8    6.87  )-----------( 127      ( 06 Dec 2023 06:32 )             31.8     12-05    137  |  99  |  16  ----------------------------<  146<H>  3.9   |  26  |  <0.30<L>    Ca    9.8      05 Dec 2023 06:58  Phos  4.7     12-05  Mg     2.0     12-05        Urinalysis Basic - ( 05 Dec 2023 06:58 )    Color: x / Appearance: x / SG: x / pH: x  Gluc: 146 mg/dL / Ketone: x  / Bili: x / Urobili: x   Blood: x / Protein: x / Nitrite: x   Leuk Esterase: x / RBC: x / WBC x   Sq Epi: x / Non Sq Epi: x / Bacteria: x          CAPILLARY BLOOD GLUCOSE    MICROBIOLOGY:     RADIOLOGY:  [ ] Reviewed and interpreted by me    Mode: AC/ CMV (Assist Control/ Continuous Mandatory Ventilation)  RR (machine): 12  TV (machine): 300  FiO2: 30  PEEP: 5  ITime: 0.6  MAP: 9  PIP: 19   Patient is a 73y old  Female who presents with a chief complaint of bleeding (04 Dec 2023 16:57)      Interval Events:  No acute events overnight.     REVIEW OF SYSTEMS:  [ ] Positive  [X] All other systems negative  [ ] Unable to assess ROS because ________    Vital Signs Last 24 Hrs  T(C): 36.9 (12-06-23 @ 06:15), Max: 37.2 (12-05-23 @ 17:56)  T(F): 98.4 (12-06-23 @ 06:15), Max: 98.9 (12-05-23 @ 17:56)  HR: 83 (12-06-23 @ 06:15) (83 - 99)  BP: 133/71 (12-06-23 @ 06:15) (124/69 - 149/74)  RR: 20 (12-06-23 @ 06:15) (20 - 20)  SpO2: 100% (12-06-23 @ 06:15) (98% - 100%)    PHYSICAL EXAM:  HEENT:   [X]Tracheostomy:  #8 distal XLT Shiley  [X]Pupils equal  [ ]No oral lesions  [ ]Abnormal    SKIN  [ ]No Rash  [ ] Abnormal  [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    CARDIAC  [X]Regular  [ ]Abnormal    PULMONARY  [ ]Bilateral Clear Breath Sounds  [ ]Normal Excursion  [X]Abnormal - BL Coarse BS    GI  [X]PEG      [X] +BS		              [X]Soft, nondistended, nontender	  [ ]Abnormal    MUSCULOSKELETAL                                   [X]Bedbound                 [ ]Abnormal    [ ]Ambulatory/OOB to chair                           EXTREMITIES                                         [X]Normal  [ ]Edema                           NEUROLOGIC  [ ] Normal, non focal  [X] Focal findings:  opens eyes spontaneously, follows commands on all 4 extremities, able to mouth words    PSYCHIATRIC  [X]Alert and appropriate  [ ] Sedated	 [ ]Agitated    :  Mcbride: [ ] Yes, if yes: Date of Placement:                   [X] No    LINES: Central Lines [ ] Yes, if yes: Date of Placement                                     [X] No    HOSPITAL MEDICATIONS:  MEDICATIONS  (STANDING):  albuterol/ipratropium for Nebulization 3 milliLiter(s) Nebulizer every 6 hours  amLODIPine   Tablet 10 milliGRAM(s) Oral daily  artificial  tears Solution 2 Drop(s) Both EYES four times a day  ascorbic acid 500 milliGRAM(s) Oral daily  calcium carbonate    500 mG (Tums) Chewable 1 Tablet(s) Chew every 12 hours  cephalexin 500 milliGRAM(s) Oral every 6 hours  chlorhexidine 0.12% Liquid 15 milliLiter(s) Oral Mucosa every 12 hours  chlorhexidine 4% Liquid 1 Application(s) Topical <User Schedule>  ciprofloxacin   IVPB 400 milliGRAM(s) IV Intermittent every 12 hours  dextrose 50% Injectable 25 Gram(s) IV Push once  dextrose 50% Injectable 12.5 Gram(s) IV Push once  dextrose 50% Injectable 50 milliLiter(s) IV Push once  dextrose 50% Injectable 25 Gram(s) IV Push once  dextrose Oral Gel 15 Gram(s) Oral once  escitalopram 10 milliGRAM(s) Oral <User Schedule>  gabapentin Solution 100 milliGRAM(s) Enteral Tube at bedtime  glucagon  Injectable 1 milliGRAM(s) IntraMuscular once  hemorrhoidal Ointment      hemorrhoidal Ointment 1 Application(s) Rectal daily  insulin glargine Injectable (LANTUS) 20 Unit(s) SubCutaneous every morning  insulin lispro (ADMELOG) corrective regimen sliding scale   SubCutaneous every 6 hours  insulin regular  human recombinant 22 Unit(s) SubCutaneous <User Schedule>  insulin regular  human recombinant 11 Unit(s) SubCutaneous <User Schedule>  levothyroxine 125 MICROGram(s) Oral <User Schedule>  lidocaine   4% Patch 1 Patch Transdermal daily  melatonin 5 milliGRAM(s) Oral at bedtime  melatonin 1 milliGRAM(s) Oral at bedtime  multivitamin/minerals/iron Oral Solution (CENTRUM) 15 milliLiter(s) Oral daily  nystatin Powder 1 Application(s) Topical every 8 hours  pantoprazole   Suspension 40 milliGRAM(s) Enteral Tube <User Schedule>  petrolatum Ophthalmic Ointment 1 Application(s) Both EYES four times a day  predniSONE   Tablet 5 milliGRAM(s) Oral daily  QUEtiapine 12.5 milliGRAM(s) Oral <User Schedule>  simethicone 80 milliGRAM(s) Chew four times a day  sodium chloride 3%  Inhalation 4 milliLiter(s) Inhalation every 12 hours  zinc oxide 40% Paste 1 Application(s) Topical daily    MEDICATIONS  (PRN):  acetaminophen   Oral Liquid .. 1000 milliGRAM(s) Enteral Tube every 6 hours PRN Temp greater or equal to 38C (100.4F), Mild Pain (1 - 3)  benzocaine 20% Spray 1 Spray(s) Topical four times a day PRN Cough  diclofenac sodium 1% Gel 2 Gram(s) Topical four times a day PRN pain  diphenhydrAMINE Elixir 25 milliGRAM(s) Oral every 6 hours PRN Rash and/or Itching  guaifenesin/dextromethorphan Oral Liquid 10 milliLiter(s) Oral every 6 hours PRN Cough  oxyCODONE    Solution 2.5 milliGRAM(s) Oral every 6 hours PRN Moderate Pain (4 - 6)  sodium chloride 0.65% Nasal 1 Spray(s) Both Nostrils every 6 hours PRN Nasal Congestion      LABS:                        9.8    6.87  )-----------( 127      ( 06 Dec 2023 06:32 )             31.8     12-05    137  |  99  |  16  ----------------------------<  146<H>  3.9   |  26  |  <0.30<L>    Ca    9.8      05 Dec 2023 06:58  Phos  4.7     12-05  Mg     2.0     12-05        Urinalysis Basic - ( 05 Dec 2023 06:58 )    Color: x / Appearance: x / SG: x / pH: x  Gluc: 146 mg/dL / Ketone: x  / Bili: x / Urobili: x   Blood: x / Protein: x / Nitrite: x   Leuk Esterase: x / RBC: x / WBC x   Sq Epi: x / Non Sq Epi: x / Bacteria: x          CAPILLARY BLOOD GLUCOSE    MICROBIOLOGY:     RADIOLOGY:  [ ] Reviewed and interpreted by me    Mode: AC/ CMV (Assist Control/ Continuous Mandatory Ventilation)  RR (machine): 12  TV (machine): 300  FiO2: 30  PEEP: 5  ITime: 0.6  MAP: 9  PIP: 19

## 2023-12-06 NOTE — PROGRESS NOTE ADULT - ASSESSMENT
74 y/o F with PMHx of T2DM, HTN, HLD, anemia, hypothyroidism, RA, fibromyalgia, remote cerebral aneurysm repair, acute hypercapnic respiratory failure now with tracheostomy, PEG tube, and bedbound (trach change 8/18, PEG change 8/14), sacral pressure ulcer, BIBEMS from home for 6 day hx of bleeding from the tracheostomy and PEG tube with associated abdominal pain, recent history of auditory and visual hallucinations, and with chronic sacral decubitus ulcer. Exam notable for mild abdominal distention with LLQ and RLQ tenderness, tracheostomy in place with no obvious bleeding, PEG tube with scant amount of dried blood, at baseline neurologic status. Imaging showing no active bleeding around tracheostomy site, however was notable for mild thickening along PEG tube tract, sacral ulcer extending to the coccyx suggestive of possible osteomyelitis, and bibasilar patchy lung opacities with volume loss. Patient admitted for respiratory support, wound care of tracheostomy and PEG, and management of sacral osteomyelitis. No further Trach and peg site  bleeding noted since admission.  Pelvic MRI imaging also suggestive of Acute OM. Patient placed on long-term antibiotics with Infectious disease guidance.  Additionally treated with a 7d course of Cefepime due to PSA and Serratia tracheitis on 11/8-->11/15 and then resumed cipro/keflex.  Hospital course c/b Delirium for which Seroquel was added and home Lexapro continued. Tracheostomy changed to #9 Cuffed [place] by ENT 11/3.  Despite trach change patient remained with persistent air leak.  On 11/19, the trach was again changed due to leak and loss of volumes on vent.  Trach was changed to #8 distal cuffed Shiley.  Patient seen by Dermatology for back lesions.  One diagnosed as a seborrheic keratitis and the other a dermatofibroma. 1    11/18:  Attempted trach collar which was tolerated for over 2.5 hours  however ABG revealed acute respiratory acidosis.  Thus patient placed back on vent.  Subsequent ABG with mild improvement thus RR increased from 12 to 16.     11/20: c/w Trial of CPAP w high setting, wean as tolerated. Vent as needed . - c/w current abx. as per ID pharmacist, multiple drug interactions - recommend to change Cipro to IV   11/21-- reevaluated by GI for bleeding from PEG and RLQ abdominal pain -- no acute intervention       Wound Consult requested to assist w/ management of:  Sacral stage 4 pressure injury    Sacral wound- improving, continue w/Aquacel dressing QD  Trach Mngt as per RCU  PEG Mngt as per GI  BLE elevation  Abx per RCU/ ID  Moisturize intact skin w/ SWEEN cream BID  Nutrition Consult for optimization          encourage high quality protein, yaush/ prosource, MVI & Vit C to promote wound healing  Hyperglycemia -  ADA diet and  FS w/ ISS,  Continue turning and positioning w/ offloading assistive devices as per protocol  Buttock Kanchan BID and prn soiling        Continue w/ attends under pads and Pericare w/ emerson cath maintenance as per protocol  Waffle Cushion to chair when oob to chair  Continue w/ low air loss pressure redistribution bed surface   Pt will need Group 2 mattress on hospital bed and ROHO cushion for wheel chair upon discharge home  Care as per RCU, will follow w/ you  Upon discharge f/[facility] as outpatient at [facility] [address] [place] [address] [phone]  d/w mary  RN team   Angela Anthony PA-C CWS [id number]  Nights/ Weekends/ Holidays please call:  General Surgery Consult pager [phone]) for emergencies  Wound PT for multilayer leg wrapping or VAC issues (x [phone])   I spent 35 minutes face to face w/ this pt of which more than 50% of the time was spent counseling & coordinating care of this pt.      74 y/o F with PMHx of T2DM, HTN, HLD, anemia, hypothyroidism, RA, fibromyalgia, remote cerebral aneurysm repair, acute hypercapnic respiratory failure now with tracheostomy, PEG tube, and bedbound (trach change 8/18, PEG change 8/14), sacral pressure ulcer, BIBEMS from home for 6 day hx of bleeding from the tracheostomy and PEG tube with associated abdominal pain, recent history of auditory and visual hallucinations, and with chronic sacral decubitus ulcer. Exam notable for mild abdominal distention with LLQ and RLQ tenderness, tracheostomy in place with no obvious bleeding, PEG tube with scant amount of dried blood, at baseline neurologic status. Imaging showing no active bleeding around tracheostomy site, however was notable for mild thickening along PEG tube tract, sacral ulcer extending to the coccyx suggestive of possible osteomyelitis, and bibasilar patchy lung opacities with volume loss. Patient admitted for respiratory support, wound care of tracheostomy and PEG, and management of sacral osteomyelitis. No further Trach and peg site  bleeding noted since admission.  Pelvic MRI imaging also suggestive of Acute OM. Patient placed on long-term antibiotics with Infectious disease guidance.  Additionally treated with a 7d course of Cefepime due to PSA and Serratia tracheitis on 11/8-->11/15 and then resumed cipro/keflex.  Hospital course c/b Delirium for which Seroquel was added and home Lexapro continued. Tracheostomy changed to #9 Cuffed [place] by ENT 11/3.  Despite trach change patient remained with persistent air leak.  On 11/19, the trach was again changed due to leak and loss of volumes on vent.  Trach was changed to #8 distal cuffed Shiley.  Patient seen by Dermatology for back lesions.  One diagnosed as a seborrheic keratitis and the other a dermatofibroma. 1    11/18:  Attempted trach collar which was tolerated for over 2.5 hours  however ABG revealed acute respiratory acidosis.  Thus patient placed back on vent.  Subsequent ABG with mild improvement thus RR increased from 12 to 16.     11/20: c/w Trial of CPAP w high setting, wean as tolerated. Vent as needed . - c/w current abx. as per ID pharmacist, multiple drug interactions - recommend to change Cipro to IV   11/21-- reevaluated by GI for bleeding from PEG and RLQ abdominal pain -- no acute intervention       Wound Consult requested to assist w/ management of:  Sacral stage 4 pressure injury    Sacral wound- improving, continue w/Aquacel dressing QD  Trach Mngt as per RCU  PEG Mngt as per GI  BLE elevation  Abx per RCU/ ID  Moisturize intact skin w/ SWEEN cream BID  Nutrition Consult for optimization          encourage high quality protein, ayush/ prosource, MVI & Vit C to promote wound healing  Hyperglycemia -  ADA diet and  FS w/ ISS,  Continue turning and positioning w/ offloading assistive devices as per protocol  Buttock Kanchan BID and prn soiling        Continue w/ attends under pads and Pericare w/ emerson cath maintenance as per protocol  Waffle Cushion to chair when oob to chair  Continue w/ low air loss pressure redistribution bed surface   Pt will need Group 2 mattress on hospital bed and ROHO cushion for wheel chair upon discharge home  Care as per RCU, will follow w/ you  Upon discharge f/u as outpatient at [facility] [address] [place] [address] [phone]  d/w mary  RN team   Angela Anthony PA-C CWS [id number]  Nights/ Weekends/ Holidays please call:  General Surgery Consult pager ([phone]) for emergencies  Wound PT for multilayer leg wrapping or VAC issues (x [phone])   I spent 35 minutes face to face w/ this pt of which more than 50% of the time was spent counseling & coordinating care of this pt.

## 2023-12-06 NOTE — SPEAKING VALVE EVALUATION - COMMENTS
Continued history:   >ED course notable for CT neck imaging showing no evidence of active bleeding around the tracheostomy site, no hematomas, and no drainable collection around the tracheostomy site.  > CT abdomen/pelvis notable for gastrostomy tube localized to the stomach with mild thickening noted along the tract; a sacral decubitus ulcer extending to the coccyx with osseous remodeling, possibly representing osteomyelitis; and bibasilar patchy opacities with volume loss in the lungs, representing atelectasis vs infection.   >stage 4 sacral pressure injury  >Pelvic MRI imaging also suggestive of Acute OM.   >Patient placed on long-term antibiotics with Infectious disease guidance.  Additionally treated with a 7d course of Cefepime due to PSA and Serratia tracheitis on 11/8-->11/15 and then resumed cipro/keflex.     Hospital course c/b Delirium for which Seroquel was added and home Lexapro continued. Tracheostomy changed to #9 Cuffed [place] by ENT 11/3.  Despite trach change patient remained with persistent air leak.  On 11/19, the trach was again changed due to leak and loss of volumes on vent.  Trach was changed to #8 distal cuffed Shiley.  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  >Patient seen by Dermatology for back lesions.  One diagnosed as a seborrheic keratitis and the other a dermatofibroma.   >Nov 14: ENT note-->  #8 shiley distal XLT cuffed.     Speech & Swallow : New to service.   >11/30 verbal: Daughter reports pt had a FEEs at [place] and was cleared for a diet while on a vent and had SLP at home with patient having po; mainly small sips of thin liquids and minced solids for comfort. No other information provided verbally.  >Known; Home vent settings: (300 TV/ RR 12/5 Peep/ Fio2 30%).  11/18 RR increased to 14 due to hypercarbia from trach collar trial;  Tolerates intermittent PSV 15/5 during day/ Vent HS Continued history:   >ED course notable for CT neck imaging showing no evidence of active bleeding around the tracheostomy site, no hematomas, and no drainable collection around the tracheostomy site.  > CT abdomen/pelvis notable for gastrostomy tube localized to the stomach with mild thickening noted along the tract; a sacral decubitus ulcer extending to the coccyx with osseous remodeling, possibly representing osteomyelitis; and bibasilar patchy opacities with volume loss in the lungs, representing atelectasis vs infection.   >stage 4 sacral pressure injury  >Pelvic MRI imaging also suggestive of Acute OM.   >Patient placed on long-term antibiotics with Infectious disease guidance.  Additionally treated with a 7d course of Cefepime due to PSA and Serratia tracheitis on 11/8-->11/15 and then resumed cipro/keflex.     Hospital course c/b Delirium for which Seroquel was added and home Lexapro continued. Tracheostomy changed to #9 Cuffed [place] by ENT 11/3.  Despite trach change patient remained with persistent air leak.  On 11/19, the trach was again changed due to leak and loss of volumes on vent.  Trach was changed to #8 distal cuffed Shiley.  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  >Patient seen by Dermatology for back lesions.  One diagnosed as a seborrheic keratitis and the other a dermatofibroma.   >Nov 14: ENT note-->  #8 shiley distal XLT cuffed.     Speech & Swallow : New to service.   >11/30 verbal: Daughter reports pt had a FEEs at [place] and was cleared for a diet while on a vent and had SLP at home with patient having po; mainly small sips of thin liquids and minced solids for comfort. No other information provided verbally.  >Known; Home vent settings: (300 TV/ RR 12/5 Peep/ Fio2 30%).  11/18 RR increased to 14 due to hypercarbia from trach collar trial;  Tolerates intermittent PSV 15/5 during day/ Vent HS

## 2023-12-06 NOTE — PROGRESS NOTE ADULT - ASSESSMENT
73 year old female with respiratory failure s/p trach and PEG, sacral osteomyelitis.     Erratic nature of bowel movements  there are numerous factors ranging from debility, antibiotics, and tube feeds.   laxatives to be given only PRN; bowel movements will continue to be inconsistent due to the aforementioned factors.    PEG   functioning well    OM of sacrum  on keflex  care per medicine appreciated    The plan of care was discussed with the physician assistant and modifications were made to the notation where appropriate.   Differential diagnosis and plan of care discussed with patient after the evaluation  35 minutes spent on total encounter of which more than fifty percent of the encounter was spent counseling and/or coordinating care by the attending physician.    [facility]  Andrew Morgan M.D.   [address], [place]  Office: [phone]  Cell: [phone]       73 year old female with respiratory failure s/p trach and PEG, sacral osteomyelitis.     Erratic nature of bowel movements  there are numerous factors ranging from debility, antibiotics, and tube feeds.   laxatives to be given only PRN; bowel movements will continue to be inconsistent due to the aforementioned factors.    PEG   functioning well    OM of sacrum  on keflex  care per medicine appreciated    The plan of care was discussed with the physician assistant and modifications were made to the notation where appropriate.   Differential diagnosis and plan of care discussed with patient after the evaluation  35 minutes spent on total encounter of which more than fifty percent of the encounter was spent counseling and/or coordinating care by the attending physician.    [facility]  Andrew Morgan M.D.   [address] [place], [place]  Office: [phone]  Cell: [phone]

## 2023-12-06 NOTE — PROGRESS NOTE ADULT - PROBLEM SELECTOR PLAN 7
- s/p Home Levemir 14 units in morning held on admission as noted w/ hypoglycemia   - Enteral feed changed to [employer] diabetic formula; glucose numbers stabilizing. - s/p Home Levemir 14 units in morning held on admission as noted w/ hypoglycemia   - Enteral feed changed to [employer] diabetic formula; glucose numbers stabilizing. - s/p Home Levemir 14 units in morning held on admission as noted w/ hypoglycemia   - Enteral feed changed to [employer] diabetic formula; glucose numbers stabilizing  - adjustments made as per endocrine - s/p Home Levemir 14 units in morning held on admission as noted w/ hypoglycemia   - Enteral feed changed to [employer] diabetic formula; glucose numbers stabilizing  - adjustments made as per endocrine

## 2023-12-06 NOTE — PROGRESS NOTE ADULT - SUBJECTIVE AND OBJECTIVE BOX
[facility]-- WOUND TEAM -- FOLLOW UP NOTE  --------------------------------------------------------------------------------    24 hour events/subjective:    afebrile  more alert  tolerating TF w/o vomiting  tolerating dressings     no odor pain excess drainage  incontinent-     Diet:  Diet, NPO with Tube Feed:   Tube Feeding Modality: Gastrostomy  ACSIAN glucose support 1.2  Total Volume for 24 Hours (mL): 1200  Continuous  Starting Tube Feed Rate mL per Hour: 60  Increase Tube Feed Rate by (mL): 0  Until Goal Tube Feed Rate (mL per Hour): 60  Tube Feed Duration (in Hours): 20  Tube Feed Start Time: 06:00  Tube Feed Stop Time: 02:00  Free Water Flush  Pump   Rate (mL per Hour): 10   Frequency: Every 2 Hours  Alfred(7 Gm Arginine/7 Gm Glut/1.2 Gm HMB     Qty per Day:  2 (11-29-23 @ 14:12)      ROS: pt unable to offer    ALLERGIES & MEDICATIONS  --------------------------------------------------------------------------------  Allergies  penicillin (Unknown)  heparin (Unknown)  Tagamet (Unknown)  pineapple (Unknown)  walnut (Unknown)  Andressa Rawls Hazelcharlene (Unknown)  Lyrica (Unknown)      STANDING INPATIENT MEDICATIONS  albuterol/ipratropium for Nebulization 3 milliLiter(s) Nebulizer every 6 hours  amLODIPine   Tablet 10 milliGRAM(s) Oral daily  artificial  tears Solution 2 Drop(s) Both EYES four times a day  ascorbic acid 500 milliGRAM(s) Oral daily  calcium carbonate    500 mG (Tums) Chewable 1 Tablet(s) Chew every 12 hours  cephalexin 500 milliGRAM(s) Oral every 6 hours  chlorhexidine 0.12% Liquid 15 milliLiter(s) Oral Mucosa every 12 hours  chlorhexidine 4% Liquid 1 Application(s) Topical <User Schedule>  ciprofloxacin   IVPB 400 milliGRAM(s) IV Intermittent every 12 hours  dextrose 50% Injectable 25 Gram(s) IV Push once  dextrose 50% Injectable 12.5 Gram(s) IV Push once  dextrose 50% Injectable 50 milliLiter(s) IV Push once  dextrose 50% Injectable 25 Gram(s) IV Push once  dextrose Oral Gel 15 Gram(s) Oral once  escitalopram 10 milliGRAM(s) Oral <User Schedule>  fentaNYL   Patch  25 MICROgram(s)/Hr 1 Patch Transdermal every 72 hours  gabapentin Solution 100 milliGRAM(s) Enteral Tube at bedtime  glucagon  Injectable 1 milliGRAM(s) IntraMuscular once  hemorrhoidal Ointment      hemorrhoidal Ointment 1 Application(s) Rectal daily  insulin glargine Injectable (LANTUS) 20 Unit(s) SubCutaneous every morning  insulin lispro (ADMELOG) corrective regimen sliding scale   SubCutaneous every 6 hours  insulin regular  human recombinant 11 Unit(s) SubCutaneous <User Schedule>  insulin regular  human recombinant 22 Unit(s) SubCutaneous <User Schedule>  levothyroxine 125 MICROGram(s) Oral <User Schedule>  lidocaine   4% Patch 1 Patch Transdermal daily  melatonin 5 milliGRAM(s) Oral at bedtime  melatonin 1 milliGRAM(s) Oral at bedtime  multivitamin/minerals/iron Oral Solution (CENTRUM) 15 milliLiter(s) Oral daily  nystatin Powder 1 Application(s) Topical every 8 hours  pantoprazole   Suspension 40 milliGRAM(s) Enteral Tube <User Schedule>  petrolatum Ophthalmic Ointment 1 Application(s) Both EYES four times a day  predniSONE   Tablet 5 milliGRAM(s) Oral daily  QUEtiapine 12.5 milliGRAM(s) Oral <User Schedule>  simethicone 80 milliGRAM(s) Chew four times a day  sodium chloride 3%  Inhalation 4 milliLiter(s) Inhalation every 12 hours  zinc oxide 40% Paste 1 Application(s) Topical daily      PRN INPATIENT MEDICATION  acetaminophen   Oral Liquid .. 1000 milliGRAM(s) Enteral Tube every 6 hours PRN  benzocaine 20% Spray 1 Spray(s) Topical four times a day PRN  diclofenac sodium 1% Gel 2 Gram(s) Topical four times a day PRN  diphenhydrAMINE Elixir 25 milliGRAM(s) Oral every 6 hours PRN  guaifenesin/dextromethorphan Oral Liquid 10 milliLiter(s) Oral every 6 hours PRN  oxyCODONE    Solution 2.5 milliGRAM(s) Oral every 6 hours PRN  sodium chloride 0.65% Nasal 1 Spray(s) Both Nostrils every 6 hours PRN        VITALS/PHYSICAL EXAM  --------------------------------------------------------------------------------  T(C): 37.3 (12-06-23 @ 16:07), Max: 37.3 (12-06-23 @ 16:07)  HR: 97 (12-06-23 @ 16:07) (83 - 99)  BP: 136/72 (12-06-23 @ 16:07) (112/50 - 136/72)  RR: 20 (12-06-23 @ 16:07) (20 - 20)  SpO2: 100% (12-06-23 @ 16:07) (98% - 100%)  Wt(kg): --      NAD,  Alert, frail,  WD/ WN/ WG  Total Care Sport bed  HEENT:  NC/AT, EOMI, sclera clear, mucosa moist, throat clear, trach       w/o secretions or peritrach skin irration  Gastrointestinal: soft NT/ND (+)PEG w/o drainage or skin irration  : (+) primafit  Neurology:  nonverbal, no follow commands, paraplegic  Psych: calm/ appropriate  Musculoskeletal:  pROM, no deformities/ contractures   Vascular: BLE equally warm,  no cyanosis, clubbing nor acute ischemia  Skin:  moist w/ good turgor  Sacral stage 4 pressure injury  4cm x 2cm x 0.5cm   moist granular wound bed   palpable but not exposed bone  no blistering   (+) serosanguinous drainage  No odor, erythema, increased warmth, tenderness, induration, fluctuance, nor crepitus        LABS/ CULTURES/ RADIOLOGY:              9.8    6.87  >-----------<  127      [12-06-23 @ 06:32]              31.8     138  |  100  |  15  ----------------------------<  147      [12-06-23 @ 06:33]  3.6   |  28  |  <0.30        Ca     9.7     [12-06-23 @ 06:33]      Mg     1.7     [12-06-23 @ 06:33]      Phos  4.5     [12-06-23 @ 06:33]      CAPILLARY BLOOD GLUCOSE  POCT Blood Glucose.: 134 mg/dL (06 Dec 2023 17:58)  POCT Blood Glucose.: 180 mg/dL (06 Dec 2023 11:28)  POCT Blood Glucose.: 151 mg/dL (06 Dec 2023 06:15)  POCT Blood Glucose.: 106 mg/dL (06 Dec 2023 00:01)    A1C with Estimated Average Glucose Result: 7.5 % (10-28-23 @ 07:18)   [facility]-- WOUND TEAM -- FOLLOW UP NOTE  --------------------------------------------------------------------------------    24 hour events/subjective:    afebrile  more alert  tolerating TF w/o vomiting  tolerating dressings     no odor pain excess drainage  incontinent-     Diet:  Diet, NPO with Tube Feed:   Tube Feeding Modality: Gastrostomy  Newco LS15 glucose support 1.2  Total Volume for 24 Hours (mL): 1200  Continuous  Starting Tube Feed Rate mL per Hour: 60  Increase Tube Feed Rate by (mL): 0  Until Goal Tube Feed Rate (mL per Hour): 60  Tube Feed Duration (in Hours): 20  Tube Feed Start Time: 06:00  Tube Feed Stop Time: 02:00  Free Water Flush  Pump   Rate (mL per Hour): 10   Frequency: Every 2 Hours  Alfred(7 Gm Arginine/7 Gm Glut/1.2 Gm HMB     Qty per Day:  2 (11-29-23 @ 14:12)      ROS: pt unable to offer    ALLERGIES & MEDICATIONS  --------------------------------------------------------------------------------  Allergies  penicillin (Unknown)  heparin (Unknown)  Tagamet (Unknown)  pineapple (Unknown)  walnut (Unknown)  Andressa Rawls Hazelcharlene (Unknown)  Lyrica (Unknown)      STANDING INPATIENT MEDICATIONS  albuterol/ipratropium for Nebulization 3 milliLiter(s) Nebulizer every 6 hours  amLODIPine   Tablet 10 milliGRAM(s) Oral daily  artificial  tears Solution 2 Drop(s) Both EYES four times a day  ascorbic acid 500 milliGRAM(s) Oral daily  calcium carbonate    500 mG (Tums) Chewable 1 Tablet(s) Chew every 12 hours  cephalexin 500 milliGRAM(s) Oral every 6 hours  chlorhexidine 0.12% Liquid 15 milliLiter(s) Oral Mucosa every 12 hours  chlorhexidine 4% Liquid 1 Application(s) Topical <User Schedule>  ciprofloxacin   IVPB 400 milliGRAM(s) IV Intermittent every 12 hours  dextrose 50% Injectable 25 Gram(s) IV Push once  dextrose 50% Injectable 12.5 Gram(s) IV Push once  dextrose 50% Injectable 50 milliLiter(s) IV Push once  dextrose 50% Injectable 25 Gram(s) IV Push once  dextrose Oral Gel 15 Gram(s) Oral once  escitalopram 10 milliGRAM(s) Oral <User Schedule>  fentaNYL   Patch  25 MICROgram(s)/Hr 1 Patch Transdermal every 72 hours  gabapentin Solution 100 milliGRAM(s) Enteral Tube at bedtime  glucagon  Injectable 1 milliGRAM(s) IntraMuscular once  hemorrhoidal Ointment      hemorrhoidal Ointment 1 Application(s) Rectal daily  insulin glargine Injectable (LANTUS) 20 Unit(s) SubCutaneous every morning  insulin lispro (ADMELOG) corrective regimen sliding scale   SubCutaneous every 6 hours  insulin regular  human recombinant 11 Unit(s) SubCutaneous <User Schedule>  insulin regular  human recombinant 22 Unit(s) SubCutaneous <User Schedule>  levothyroxine 125 MICROGram(s) Oral <User Schedule>  lidocaine   4% Patch 1 Patch Transdermal daily  melatonin 5 milliGRAM(s) Oral at bedtime  melatonin 1 milliGRAM(s) Oral at bedtime  multivitamin/minerals/iron Oral Solution (CENTRUM) 15 milliLiter(s) Oral daily  nystatin Powder 1 Application(s) Topical every 8 hours  pantoprazole   Suspension 40 milliGRAM(s) Enteral Tube <User Schedule>  petrolatum Ophthalmic Ointment 1 Application(s) Both EYES four times a day  predniSONE   Tablet 5 milliGRAM(s) Oral daily  QUEtiapine 12.5 milliGRAM(s) Oral <User Schedule>  simethicone 80 milliGRAM(s) Chew four times a day  sodium chloride 3%  Inhalation 4 milliLiter(s) Inhalation every 12 hours  zinc oxide 40% Paste 1 Application(s) Topical daily      PRN INPATIENT MEDICATION  acetaminophen   Oral Liquid .. 1000 milliGRAM(s) Enteral Tube every 6 hours PRN  benzocaine 20% Spray 1 Spray(s) Topical four times a day PRN  diclofenac sodium 1% Gel 2 Gram(s) Topical four times a day PRN  diphenhydrAMINE Elixir 25 milliGRAM(s) Oral every 6 hours PRN  guaifenesin/dextromethorphan Oral Liquid 10 milliLiter(s) Oral every 6 hours PRN  oxyCODONE    Solution 2.5 milliGRAM(s) Oral every 6 hours PRN  sodium chloride 0.65% Nasal 1 Spray(s) Both Nostrils every 6 hours PRN        VITALS/PHYSICAL EXAM  --------------------------------------------------------------------------------  T(C): 37.3 (12-06-23 @ 16:07), Max: 37.3 (12-06-23 @ 16:07)  HR: 97 (12-06-23 @ 16:07) (83 - 99)  BP: 136/72 (12-06-23 @ 16:07) (112/50 - 136/72)  RR: 20 (12-06-23 @ 16:07) (20 - 20)  SpO2: 100% (12-06-23 @ 16:07) (98% - 100%)  Wt(kg): --      NAD,  Alert, frail,  WD/ WN/ WG  Total Care Sport bed  HEENT:  NC/AT, EOMI, sclera clear, mucosa moist, throat clear, trach       w/o secretions or peritrach skin irration  Gastrointestinal: soft NT/ND (+)PEG w/o drainage or skin irration  : (+) primafit  Neurology:  nonverbal, no follow commands, paraplegic  Psych: calm/ appropriate  Musculoskeletal:  pROM, no deformities/ contractures   Vascular: BLE equally warm,  no cyanosis, clubbing nor acute ischemia  Skin:  moist w/ good turgor  Sacral stage 4 pressure injury  4cm x 2cm x 0.5cm   moist granular wound bed   palpable but not exposed bone  no blistering   (+) serosanguinous drainage  No odor, erythema, increased warmth, tenderness, induration, fluctuance, nor crepitus        LABS/ CULTURES/ RADIOLOGY:              9.8    6.87  >-----------<  127      [12-06-23 @ 06:32]              31.8     138  |  100  |  15  ----------------------------<  147      [12-06-23 @ 06:33]  3.6   |  28  |  <0.30        Ca     9.7     [12-06-23 @ 06:33]      Mg     1.7     [12-06-23 @ 06:33]      Phos  4.5     [12-06-23 @ 06:33]      CAPILLARY BLOOD GLUCOSE  POCT Blood Glucose.: 134 mg/dL (06 Dec 2023 17:58)  POCT Blood Glucose.: 180 mg/dL (06 Dec 2023 11:28)  POCT Blood Glucose.: 151 mg/dL (06 Dec 2023 06:15)  POCT Blood Glucose.: 106 mg/dL (06 Dec 2023 00:01)    A1C with Estimated Average Glucose Result: 7.5 % (10-28-23 @ 07:18)

## 2023-12-06 NOTE — PROGRESS NOTE ADULT - TIME BILLING
care coordination, review of radiology, labs, management discussion with the team and coordination of care

## 2023-12-06 NOTE — SPEAKING VALVE EVALUATION - H & P REVIEW
MAXX LOPEZ is a 74 y/o F with PMHx of T2DM, HTN, HLD, anemia, hypothyroidism, RA, fibromyalgia, remote cerebral aneurysm repair, acute hypercapnic respiratory failure now with tracheostomy, PEG tube, and bedbound (trach change 8/18, PEG change 8/14), sacral pressure ulcer, BIBEMS from home for 6 day hx of bleeding from the tracheostomy and PEG tube with associated abdominal pain. Was seen outpatient on 10/26 by critical care Dr. Zaki Gottlieb and apparently had cultures sent at that time. Patient also noted to have chronic sacral decubitous ulcer. Family endorsed one episode of fever, though was not febrile on evaluation. Daughter noted patient was recently experiencing auditory and visual hallucinations./yes MAXX LOPEZ is a 72 y/o F with PMHx of T2DM, HTN, HLD, anemia, hypothyroidism, RA, fibromyalgia, remote cerebral aneurysm repair, acute hypercapnic respiratory failure now with tracheostomy, PEG tube, and bedbound (trach change 8/18, PEG change 8/14), sacral pressure ulcer, BIBEMS from home for 6 day hx of bleeding from the tracheostomy and PEG tube with associated abdominal pain. Was seen outpatient on 10/26 by critical care Dr. Zaki Gottlieb and apparently had cultures sent at that time. Patient also noted to have chronic sacral decubitous ulcer. Family endorsed one episode of fever, though was not febrile on evaluation. Daughter noted patient was recently experiencing auditory and visual hallucinations./yes

## 2023-12-06 NOTE — PROGRESS NOTE ADULT - ASSESSMENT
74 y/o F with PMHx of T2DM, HTN, HLD, anemia, hypothyroidism, RA, fibromyalgia, remote cerebral aneurysm repair, acute hypercapnic respiratory failure now with tracheostomy, PEG tube, and bedbound (trach change 8/18, PEG change 8/14), sacral pressure ulcer, BIBEMS from home for 6 day hx of bleeding from the tracheostomy and PEG tube with associated abdominal pain, recent history of auditory and visual hallucinations, and with chronic sacral decubitus ulcer. Exam notable for mild abdominal distention with LLQ and RLQ tenderness, tracheostomy in place with no obvious bleeding, PEG tube with scant amount of dried blood, at baseline neurologic status. Imaging showing no active bleeding around tracheostomy site, however was notable for mild thickening along PEG tube tract, sacral ulcer extending to the coccyx suggestive of possible osteomyelitis, and bibasilar patchy lung opacities with volume loss. Patient admitted for respiratory support, wound care of tracheostomy and PEG, and management of sacral osteomyelitis. No further Trach and peg site bleeding noted since admission.  Pelvic MRI imaging also suggestive of Acute OM. Patient placed on long-term antibiotics with Infectious disease guidance.  Additionally treated with a 7d course of Cefepime due to PSA and Serratia tracheitis on 11/8-->11/15 and then resumed cipro/keflex.  Hospital course c/b Delirium for which Seroquel was added and home Lexapro continued. Tracheostomy changed to #9 Cuffed [place] by ENT 11/3.  Despite trach change patient remained with persistent air leak.  On 11/19, the trach was again changed due to leak and loss of volumes on vent.  Trach was changed to #8 distal cuffed Shiley.  Patient seen by Dermatology for back lesions.  One diagnosed as a seborrheic keratitis and the other a dermatofibroma.  Intermittent abdominal pain throughout hospital course, at times relieved with gas/BM, w/u negative, seen by GI - no recs.  Remains medically stable while pending discharge home.      12/5 -  Pt stable throughout the day.  Afebrile, hemodynamically stable.  Pt voiding spontaneously, has not required further straight cath'ing.   at bedside during rounds and updated in plan of care.   72 y/o F with PMHx of T2DM, HTN, HLD, anemia, hypothyroidism, RA, fibromyalgia, remote cerebral aneurysm repair, acute hypercapnic respiratory failure now with tracheostomy, PEG tube, and bedbound (trach change 8/18, PEG change 8/14), sacral pressure ulcer, BIBEMS from home for 6 day hx of bleeding from the tracheostomy and PEG tube with associated abdominal pain, recent history of auditory and visual hallucinations, and with chronic sacral decubitus ulcer. Exam notable for mild abdominal distention with LLQ and RLQ tenderness, tracheostomy in place with no obvious bleeding, PEG tube with scant amount of dried blood, at baseline neurologic status. Imaging showing no active bleeding around tracheostomy site, however was notable for mild thickening along PEG tube tract, sacral ulcer extending to the coccyx suggestive of possible osteomyelitis, and bibasilar patchy lung opacities with volume loss. Patient admitted for respiratory support, wound care of tracheostomy and PEG, and management of sacral osteomyelitis. No further Trach and peg site bleeding noted since admission.  Pelvic MRI imaging also suggestive of Acute OM. Patient placed on long-term antibiotics with Infectious disease guidance.  Additionally treated with a 7d course of Cefepime due to PSA and Serratia tracheitis on 11/8-->11/15 and then resumed cipro/keflex.  Hospital course c/b Delirium for which Seroquel was added and home Lexapro continued. Tracheostomy changed to #9 Cuffed [place] by ENT 11/3.  Despite trach change patient remained with persistent air leak.  On 11/19, the trach was again changed due to leak and loss of volumes on vent.  Trach was changed to #8 distal cuffed Shiley.  Patient seen by Dermatology for back lesions.  One diagnosed as a seborrheic keratitis and the other a dermatofibroma.  Intermittent abdominal pain throughout hospital course, at times relieved with gas/BM, w/u negative, seen by GI - no recs.  Remains medically stable while pending discharge home.      12/5 -  Pt stable throughout the day.  Afebrile, hemodynamically stable.  Pt voiding spontaneously, has not required further straight cath'ing.   at bedside during rounds and updated in plan of care.   74 y/o F with PMHx of T2DM, HTN, HLD, anemia, hypothyroidism, RA, fibromyalgia, remote cerebral aneurysm repair, acute hypercapnic respiratory failure now with tracheostomy, PEG tube, and bedbound (trach change 8/18, PEG change 8/14), sacral pressure ulcer, BIBEMS from home for 6 day hx of bleeding from the tracheostomy and PEG tube with associated abdominal pain, recent history of auditory and visual hallucinations, and with chronic sacral decubitus ulcer. Exam notable for mild abdominal distention with LLQ and RLQ tenderness, tracheostomy in place with no obvious bleeding, PEG tube with scant amount of dried blood, at baseline neurologic status. Imaging showing no active bleeding around tracheostomy site, however was notable for mild thickening along PEG tube tract, sacral ulcer extending to the coccyx suggestive of possible osteomyelitis, and bibasilar patchy lung opacities with volume loss. Patient admitted for respiratory support, wound care of tracheostomy and PEG, and management of sacral osteomyelitis. No further Trach and peg site bleeding noted since admission.  Pelvic MRI imaging also suggestive of Acute OM. Patient placed on long-term antibiotics with Infectious disease guidance.  Additionally treated with a 7d course of Cefepime due to PSA and Serratia tracheitis on 11/8-->11/15 and then resumed cipro/keflex.  Hospital course c/b Delirium for which Seroquel was added and home Lexapro continued. Tracheostomy changed to #9 Cuffed [place] by ENT 11/3.  Despite trach change patient remained with persistent air leak.  On 11/19, the trach was again changed due to leak and loss of volumes on vent.  Trach was changed to #8 distal cuffed Shiley.  Patient seen by Dermatology for back lesions.  One diagnosed as a seborrheic keratitis and the other a dermatofibroma.  Intermittent abdominal pain throughout hospital course, at times relieved with gas/BM, w/u negative, seen by GI - no recs.  Remains medically stable while pending discharge home.      12/6:   74 y/o F with PMHx of T2DM, HTN, HLD, anemia, hypothyroidism, RA, fibromyalgia, remote cerebral aneurysm repair, acute hypercapnic respiratory failure now with tracheostomy, PEG tube, and bedbound (trach change 8/18, PEG change 8/14), sacral pressure ulcer, BIBEMS from home for 6 day hx of bleeding from the tracheostomy and PEG tube with associated abdominal pain, recent history of auditory and visual hallucinations, and with chronic sacral decubitus ulcer. Exam notable for mild abdominal distention with LLQ and RLQ tenderness, tracheostomy in place with no obvious bleeding, PEG tube with scant amount of dried blood, at baseline neurologic status. Imaging showing no active bleeding around tracheostomy site, however was notable for mild thickening along PEG tube tract, sacral ulcer extending to the coccyx suggestive of possible osteomyelitis, and bibasilar patchy lung opacities with volume loss. Patient admitted for respiratory support, wound care of tracheostomy and PEG, and management of sacral osteomyelitis. No further Trach and peg site bleeding noted since admission.  Pelvic MRI imaging also suggestive of Acute OM. Patient placed on long-term antibiotics with Infectious disease guidance.  Additionally treated with a 7d course of Cefepime due to PSA and Serratia tracheitis on 11/8-->11/15 and then resumed cipro/keflex.  Hospital course c/b Delirium for which Seroquel was added and home Lexapro continued. Tracheostomy changed to #9 Cuffed [place] by ENT 11/3.  Despite trach change patient remained with persistent air leak.  On 11/19, the trach was again changed due to leak and loss of volumes on vent.  Trach was changed to #8 distal cuffed Shiley.  Patient seen by Dermatology for back lesions.  One diagnosed as a seborrheic keratitis and the other a dermatofibroma.  Intermittent abdominal pain throughout hospital course, at times relieved with gas/BM, w/u negative, seen by GI - no recs.  Remains medically stable while pending discharge home.      12/6:  Spoke with patients daughter this am, reviewed current plan of care.  Fentanyl patches added for pain management.  Plan to attempt in line PMV tomorrow with SLP and RT.  Pt is otherwise medically stable.  All concerns/questions were addressed, daughter in agreement.  Per case management - possible d/c for Friday - daughter is aware.

## 2023-12-07 LAB
ANION GAP SERPL CALC-SCNC: 11 MMOL/L — SIGNIFICANT CHANGE UP (ref 5–17)
ANION GAP SERPL CALC-SCNC: 11 MMOL/L — SIGNIFICANT CHANGE UP (ref 5–17)
BUN SERPL-MCNC: 22 MG/DL — SIGNIFICANT CHANGE UP (ref 7–23)
BUN SERPL-MCNC: 22 MG/DL — SIGNIFICANT CHANGE UP (ref 7–23)
CALCIUM SERPL-MCNC: 9.9 MG/DL — SIGNIFICANT CHANGE UP (ref 8.4–10.5)
CALCIUM SERPL-MCNC: 9.9 MG/DL — SIGNIFICANT CHANGE UP (ref 8.4–10.5)
CHLORIDE SERPL-SCNC: 100 MMOL/L — SIGNIFICANT CHANGE UP (ref 96–108)
CHLORIDE SERPL-SCNC: 100 MMOL/L — SIGNIFICANT CHANGE UP (ref 96–108)
CO2 SERPL-SCNC: 28 MMOL/L — SIGNIFICANT CHANGE UP (ref 22–31)
CO2 SERPL-SCNC: 28 MMOL/L — SIGNIFICANT CHANGE UP (ref 22–31)
CREAT SERPL-MCNC: <0.3 MG/DL — LOW (ref 0.5–1.3)
CREAT SERPL-MCNC: <0.3 MG/DL — LOW (ref 0.5–1.3)
EGFR: 113 ML/MIN/1.73M2 — SIGNIFICANT CHANGE UP
EGFR: 113 ML/MIN/1.73M2 — SIGNIFICANT CHANGE UP
GLUCOSE BLDC GLUCOMTR-MCNC: 111 MG/DL — HIGH (ref 70–99)
GLUCOSE BLDC GLUCOMTR-MCNC: 111 MG/DL — HIGH (ref 70–99)
GLUCOSE BLDC GLUCOMTR-MCNC: 122 MG/DL — HIGH (ref 70–99)
GLUCOSE BLDC GLUCOMTR-MCNC: 122 MG/DL — HIGH (ref 70–99)
GLUCOSE BLDC GLUCOMTR-MCNC: 130 MG/DL — HIGH (ref 70–99)
GLUCOSE BLDC GLUCOMTR-MCNC: 130 MG/DL — HIGH (ref 70–99)
GLUCOSE BLDC GLUCOMTR-MCNC: 158 MG/DL — HIGH (ref 70–99)
GLUCOSE BLDC GLUCOMTR-MCNC: 158 MG/DL — HIGH (ref 70–99)
GLUCOSE BLDC GLUCOMTR-MCNC: 174 MG/DL — HIGH (ref 70–99)
GLUCOSE BLDC GLUCOMTR-MCNC: 174 MG/DL — HIGH (ref 70–99)
GLUCOSE BLDC GLUCOMTR-MCNC: 76 MG/DL — SIGNIFICANT CHANGE UP (ref 70–99)
GLUCOSE BLDC GLUCOMTR-MCNC: 76 MG/DL — SIGNIFICANT CHANGE UP (ref 70–99)
GLUCOSE SERPL-MCNC: 117 MG/DL — HIGH (ref 70–99)
GLUCOSE SERPL-MCNC: 117 MG/DL — HIGH (ref 70–99)
HCT VFR BLD CALC: 34.6 % — SIGNIFICANT CHANGE UP (ref 34.5–45)
HCT VFR BLD CALC: 34.6 % — SIGNIFICANT CHANGE UP (ref 34.5–45)
HGB BLD-MCNC: 10.5 G/DL — LOW (ref 11.5–15.5)
HGB BLD-MCNC: 10.5 G/DL — LOW (ref 11.5–15.5)
MAGNESIUM SERPL-MCNC: 1.9 MG/DL — SIGNIFICANT CHANGE UP (ref 1.6–2.6)
MAGNESIUM SERPL-MCNC: 1.9 MG/DL — SIGNIFICANT CHANGE UP (ref 1.6–2.6)
MCHC RBC-ENTMCNC: 28.2 PG — SIGNIFICANT CHANGE UP (ref 27–34)
MCHC RBC-ENTMCNC: 28.2 PG — SIGNIFICANT CHANGE UP (ref 27–34)
MCHC RBC-ENTMCNC: 30.3 GM/DL — LOW (ref 32–36)
MCHC RBC-ENTMCNC: 30.3 GM/DL — LOW (ref 32–36)
MCV RBC AUTO: 92.8 FL — SIGNIFICANT CHANGE UP (ref 80–100)
MCV RBC AUTO: 92.8 FL — SIGNIFICANT CHANGE UP (ref 80–100)
NRBC # BLD: 0 /100 WBCS — SIGNIFICANT CHANGE UP (ref 0–0)
NRBC # BLD: 0 /100 WBCS — SIGNIFICANT CHANGE UP (ref 0–0)
PHOSPHATE SERPL-MCNC: 4.8 MG/DL — HIGH (ref 2.5–4.5)
PHOSPHATE SERPL-MCNC: 4.8 MG/DL — HIGH (ref 2.5–4.5)
PLATELET # BLD AUTO: 131 K/UL — LOW (ref 150–400)
PLATELET # BLD AUTO: 131 K/UL — LOW (ref 150–400)
POTASSIUM SERPL-MCNC: 3.7 MMOL/L — SIGNIFICANT CHANGE UP (ref 3.5–5.3)
POTASSIUM SERPL-MCNC: 3.7 MMOL/L — SIGNIFICANT CHANGE UP (ref 3.5–5.3)
POTASSIUM SERPL-SCNC: 3.7 MMOL/L — SIGNIFICANT CHANGE UP (ref 3.5–5.3)
POTASSIUM SERPL-SCNC: 3.7 MMOL/L — SIGNIFICANT CHANGE UP (ref 3.5–5.3)
RBC # BLD: 3.73 M/UL — LOW (ref 3.8–5.2)
RBC # BLD: 3.73 M/UL — LOW (ref 3.8–5.2)
RBC # FLD: 15.9 % — HIGH (ref 10.3–14.5)
RBC # FLD: 15.9 % — HIGH (ref 10.3–14.5)
SODIUM SERPL-SCNC: 139 MMOL/L — SIGNIFICANT CHANGE UP (ref 135–145)
SODIUM SERPL-SCNC: 139 MMOL/L — SIGNIFICANT CHANGE UP (ref 135–145)
WBC # BLD: 8.01 K/UL — SIGNIFICANT CHANGE UP (ref 3.8–10.5)
WBC # BLD: 8.01 K/UL — SIGNIFICANT CHANGE UP (ref 3.8–10.5)
WBC # FLD AUTO: 8.01 K/UL — SIGNIFICANT CHANGE UP (ref 3.8–10.5)
WBC # FLD AUTO: 8.01 K/UL — SIGNIFICANT CHANGE UP (ref 3.8–10.5)

## 2023-12-07 PROCEDURE — 99233 SBSQ HOSP IP/OBS HIGH 50: CPT

## 2023-12-07 RX ORDER — CARBAMIDE PEROXIDE 81.86 MG/ML
1 SOLUTION/ DROPS AURICULAR (OTIC) ONCE
Refills: 0 | Status: DISCONTINUED | OUTPATIENT
Start: 2023-12-07 | End: 2023-12-07

## 2023-12-07 RX ORDER — CARBAMIDE PEROXIDE 81.86 MG/ML
1 SOLUTION/ DROPS AURICULAR (OTIC) EVERY 12 HOURS
Refills: 0 | Status: COMPLETED | OUTPATIENT
Start: 2023-12-07 | End: 2023-12-10

## 2023-12-07 RX ORDER — INSULIN GLARGINE 100 [IU]/ML
16 INJECTION, SOLUTION SUBCUTANEOUS EVERY MORNING
Refills: 0 | Status: DISCONTINUED | OUTPATIENT
Start: 2023-12-07 | End: 2023-12-09

## 2023-12-07 RX ADMIN — Medication 3 MILLILITER(S): at 05:35

## 2023-12-07 RX ADMIN — ESCITALOPRAM OXALATE 10 MILLIGRAM(S): 10 TABLET, FILM COATED ORAL at 12:24

## 2023-12-07 RX ADMIN — LIDOCAINE 1 PATCH: 4 CREAM TOPICAL at 19:00

## 2023-12-07 RX ADMIN — Medication 500 MILLIGRAM(S): at 12:24

## 2023-12-07 RX ADMIN — INSULIN GLARGINE 20 UNIT(S): 100 INJECTION, SOLUTION SUBCUTANEOUS at 05:56

## 2023-12-07 RX ADMIN — Medication 2: at 18:36

## 2023-12-07 RX ADMIN — SIMETHICONE 80 MILLIGRAM(S): 80 TABLET, CHEWABLE ORAL at 12:24

## 2023-12-07 RX ADMIN — Medication 5 MILLIGRAM(S): at 21:19

## 2023-12-07 RX ADMIN — Medication 500 MILLIGRAM(S): at 23:23

## 2023-12-07 RX ADMIN — Medication 15 MILLILITER(S): at 12:33

## 2023-12-07 RX ADMIN — SODIUM CHLORIDE 4 MILLILITER(S): 9 INJECTION INTRAMUSCULAR; INTRAVENOUS; SUBCUTANEOUS at 05:35

## 2023-12-07 RX ADMIN — Medication 2 DROP(S): at 23:24

## 2023-12-07 RX ADMIN — Medication 1000 MILLIGRAM(S): at 20:42

## 2023-12-07 RX ADMIN — SODIUM CHLORIDE 4 MILLILITER(S): 9 INJECTION INTRAMUSCULAR; INTRAVENOUS; SUBCUTANEOUS at 18:07

## 2023-12-07 RX ADMIN — Medication 3 MILLILITER(S): at 23:23

## 2023-12-07 RX ADMIN — CHLORHEXIDINE GLUCONATE 15 MILLILITER(S): 213 SOLUTION TOPICAL at 05:30

## 2023-12-07 RX ADMIN — Medication 125 MICROGRAM(S): at 05:30

## 2023-12-07 RX ADMIN — Medication 500 MILLIGRAM(S): at 17:20

## 2023-12-07 RX ADMIN — Medication 200 MILLIGRAM(S): at 05:57

## 2023-12-07 RX ADMIN — QUETIAPINE FUMARATE 12.5 MILLIGRAM(S): 200 TABLET, FILM COATED ORAL at 17:30

## 2023-12-07 RX ADMIN — Medication 2 DROP(S): at 05:31

## 2023-12-07 RX ADMIN — INSULIN HUMAN 22 UNIT(S): 100 INJECTION, SOLUTION SUBCUTANEOUS at 05:56

## 2023-12-07 RX ADMIN — NYSTATIN CREAM 1 APPLICATION(S): 100000 CREAM TOPICAL at 23:23

## 2023-12-07 RX ADMIN — Medication 3 MILLILITER(S): at 12:06

## 2023-12-07 RX ADMIN — Medication 1 APPLICATION(S): at 05:31

## 2023-12-07 RX ADMIN — NYSTATIN CREAM 1 APPLICATION(S): 100000 CREAM TOPICAL at 17:17

## 2023-12-07 RX ADMIN — Medication 1: at 12:22

## 2023-12-07 RX ADMIN — SIMETHICONE 80 MILLIGRAM(S): 80 TABLET, CHEWABLE ORAL at 05:30

## 2023-12-07 RX ADMIN — LIDOCAINE 1 PATCH: 4 CREAM TOPICAL at 12:27

## 2023-12-07 RX ADMIN — Medication 5 MILLIGRAM(S): at 05:30

## 2023-12-07 RX ADMIN — NYSTATIN CREAM 1 APPLICATION(S): 100000 CREAM TOPICAL at 05:31

## 2023-12-07 RX ADMIN — Medication 1 MILLIGRAM(S): at 21:18

## 2023-12-07 RX ADMIN — AMLODIPINE BESYLATE 10 MILLIGRAM(S): 2.5 TABLET ORAL at 05:30

## 2023-12-07 RX ADMIN — Medication 2 DROP(S): at 17:18

## 2023-12-07 RX ADMIN — Medication 200 MILLIGRAM(S): at 17:16

## 2023-12-07 RX ADMIN — CHLORHEXIDINE GLUCONATE 15 MILLILITER(S): 213 SOLUTION TOPICAL at 17:16

## 2023-12-07 RX ADMIN — PHENYLEPHRINE-SHARK LIVER OIL-MINERAL OIL-PETROLATUM RECTAL OINTMENT 1 APPLICATION(S): at 17:17

## 2023-12-07 RX ADMIN — INSULIN HUMAN 11 UNIT(S): 100 INJECTION, SOLUTION SUBCUTANEOUS at 17:23

## 2023-12-07 RX ADMIN — Medication 2 DROP(S): at 12:24

## 2023-12-07 RX ADMIN — SIMETHICONE 80 MILLIGRAM(S): 80 TABLET, CHEWABLE ORAL at 23:23

## 2023-12-07 RX ADMIN — Medication 1 APPLICATION(S): at 12:26

## 2023-12-07 RX ADMIN — Medication 500 MILLIGRAM(S): at 05:30

## 2023-12-07 RX ADMIN — SIMETHICONE 80 MILLIGRAM(S): 80 TABLET, CHEWABLE ORAL at 17:16

## 2023-12-07 RX ADMIN — INSULIN HUMAN 11 UNIT(S): 100 INJECTION, SOLUTION SUBCUTANEOUS at 12:17

## 2023-12-07 RX ADMIN — Medication 3 MILLILITER(S): at 18:07

## 2023-12-07 RX ADMIN — Medication 1 TABLET(S): at 05:30

## 2023-12-07 RX ADMIN — GABAPENTIN 100 MILLIGRAM(S): 400 CAPSULE ORAL at 21:19

## 2023-12-07 RX ADMIN — Medication 1 APPLICATION(S): at 23:24

## 2023-12-07 RX ADMIN — Medication 1 TABLET(S): at 17:16

## 2023-12-07 RX ADMIN — Medication 1000 MILLIGRAM(S): at 21:40

## 2023-12-07 RX ADMIN — Medication 1: at 23:25

## 2023-12-07 RX ADMIN — CHLORHEXIDINE GLUCONATE 1 APPLICATION(S): 213 SOLUTION TOPICAL at 05:31

## 2023-12-07 RX ADMIN — Medication 1 APPLICATION(S): at 17:18

## 2023-12-07 RX ADMIN — LIDOCAINE 1 PATCH: 4 CREAM TOPICAL at 00:10

## 2023-12-07 RX ADMIN — PANTOPRAZOLE SODIUM 40 MILLIGRAM(S): 20 TABLET, DELAYED RELEASE ORAL at 11:26

## 2023-12-07 RX ADMIN — ZINC OXIDE 1 APPLICATION(S): 200 OINTMENT TOPICAL at 12:41

## 2023-12-07 RX ADMIN — CARBAMIDE PEROXIDE 1 DROP(S): 81.86 SOLUTION/ DROPS AURICULAR (OTIC) at 21:18

## 2023-12-07 NOTE — CHART NOTE - NSCHARTNOTEFT_GEN_A_CORE
Approached by respiratory therapist for concerns brought to her by  that vent was alarming.  Ventilator checked by RT, vent tubing changed.  I addressed patient, she quickly turned to the sound of my voice, smiled.  I asked patient if she was ok and she responded, "yes". Vital signs stable , O2 SAT'S 99%.

## 2023-12-07 NOTE — PROGRESS NOTE ADULT - NS ATTEND AMEND GEN_ALL_CORE FT

## 2023-12-07 NOTE — PROGRESS NOTE ADULT - PROBLEM SELECTOR PLAN 3
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  11/18 RR increased to 14 due to hypercarbia from trach collar trial  - Tolerates intermittent PSV 15/5 during day/ Vent HS  - Airway Clearance: Duoneb, Hypersal, Chest PT, PRN suctioning   - Maintain 02 sat > 92%  Tracheal Bleeding (Intermittent)-->resolved since admission   -S/p CT Angio neck: No evidence of active bleeding around tracheostomy site. No hematoma.  No collection   - Seen by ENT; Kath and b/l bronchi visualized without any trauma. small amount of blood tinged secretions resting at beginning of right bronchus not actively bleeding.  - 11/3 trach changed to #9 [place] by ENT  - 11/17: Due to persistent air leak and volume loss on vent, trach again changed by ENT to #8 distal cuffed Shiley.  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  11/18 RR increased to 14 due to hypercarbia from trach collar trial  - Tolerates intermittent PSV 15/5 during day/ Vent HS  - Airway Clearance: Duoneb, Hypersal, Chest PT, PRN suctioning   - Maintain 02 sat > 92%  Tracheal Bleeding (Intermittent)-->resolved since admission   -S/p CT Angio neck: No evidence of active bleeding around tracheostomy site. No hematoma.  No collection   - Seen by ENT; Kath and b/l bronchi visualized without any trauma. small amount of blood tinged secretions resting at beginning of right bronchus not actively bleeding.  - 11/3 trach changed to #9 [place] by ENT  - 11/17: Due to persistent air leak and volume loss on vent, trach again changed by ENT to #8 distal cuffed Shiley.  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  - tolerates PS 15/5

## 2023-12-07 NOTE — PROGRESS NOTE ADULT - PROBLEM SELECTOR PLAN 7
- s/p Home Levemir 14 units in morning held on admission as noted w/ hypoglycemia   - Enteral feed changed to Apofore diabetic formula; glucose numbers stabilizing. - s/p Home Levemir 14 units in morning held on admission as noted w/ hypoglycemia   - Enteral feed changed to Unpakt diabetic formula; glucose numbers stabilizing.

## 2023-12-07 NOTE — PROGRESS NOTE ADULT - ASSESSMENT
72 y/o F with PMHx of T2DM, HTN, HLD, anemia, hypothyroidism, RA, fibromyalgia, remote cerebral aneurysm repair, acute hypercapnic respiratory failure now with tracheostomy, PEG tube, and bedbound (trach change 8/18, PEG change 8/14), sacral pressure ulcer, BIBEMS from home for 6 day hx of bleeding from the tracheostomy and PEG tube with associated abdominal pain, recent history of auditory and visual hallucinations, and with chronic sacral decubitus ulcer. Exam notable for mild abdominal distention with LLQ and RLQ tenderness, tracheostomy in place with no obvious bleeding, PEG tube with scant amount of dried blood, at baseline neurologic status. Imaging showing no active bleeding around tracheostomy site, however was notable for mild thickening along PEG tube tract, sacral ulcer extending to the coccyx suggestive of possible osteomyelitis, and bibasilar patchy lung opacities with volume loss. Patient admitted for respiratory support, wound care of tracheostomy and PEG, and management of sacral osteomyelitis. No further Trach and peg site bleeding noted since admission.  Pelvic MRI imaging also suggestive of Acute OM. Patient placed on long-term antibiotics with Infectious disease guidance.  Additionally treated with a 7d course of Cefepime due to PSA and Serratia tracheitis on 11/8-->11/15 and then resumed cipro/keflex.  Hospital course c/b Delirium for which Seroquel was added and home Lexapro continued. Tracheostomy changed to #9 Cuffed [place] by ENT 11/3.  Despite trach change patient remained with persistent air leak.  On 11/19, the trach was again changed due to leak and loss of volumes on vent.  Trach was changed to #8 distal cuffed Shiley.  Patient seen by Dermatology for back lesions.  One diagnosed as a seborrheic keratitis and the other a dermatofibroma.  Intermittent abdominal pain throughout hospital course, at times relieved with gas/BM, w/u negative, seen by GI - no recs.  Remains medically stable while pending discharge home.      12/5 -  Pt stable throughout the day.  Afebrile, hemodynamically stable.  Pt voiding spontaneously, has not required further straight cath'ing.   at bedside during rounds and updated in plan of care.   74 y/o F with PMHx of T2DM, HTN, HLD, anemia, hypothyroidism, RA, fibromyalgia, remote cerebral aneurysm repair, acute hypercapnic respiratory failure now with tracheostomy, PEG tube, and bedbound (trach change 8/18, PEG change 8/14), sacral pressure ulcer, BIBEMS from home for 6 day hx of bleeding from the tracheostomy and PEG tube with associated abdominal pain, recent history of auditory and visual hallucinations, and with chronic sacral decubitus ulcer. Exam notable for mild abdominal distention with LLQ and RLQ tenderness, tracheostomy in place with no obvious bleeding, PEG tube with scant amount of dried blood, at baseline neurologic status. Imaging showing no active bleeding around tracheostomy site, however was notable for mild thickening along PEG tube tract, sacral ulcer extending to the coccyx suggestive of possible osteomyelitis, and bibasilar patchy lung opacities with volume loss. Patient admitted for respiratory support, wound care of tracheostomy and PEG, and management of sacral osteomyelitis. No further Trach and peg site bleeding noted since admission.  Pelvic MRI imaging also suggestive of Acute OM. Patient placed on long-term antibiotics with Infectious disease guidance.  Additionally treated with a 7d course of Cefepime due to PSA and Serratia tracheitis on 11/8-->11/15 and then resumed cipro/keflex.  Hospital course c/b Delirium for which Seroquel was added and home Lexapro continued. Tracheostomy changed to #9 Cuffed [place] by ENT 11/3.  Despite trach change patient remained with persistent air leak.  On 11/19, the trach was again changed due to leak and loss of volumes on vent.  Trach was changed to #8 distal cuffed Shiley.  Patient seen by Dermatology for back lesions.  One diagnosed as a seborrheic keratitis and the other a dermatofibroma.  Intermittent abdominal pain throughout hospital course, at times relieved with gas/BM, w/u negative, seen by GI - no recs.  Remains medically stable while pending discharge home.      12/5 -  Pt stable throughout the day.  Afebrile, hemodynamically stable.  Pt voiding spontaneously, has not required further straight cath'ing.   at bedside during rounds and updated in plan of care.   72 y/o F with PMHx of T2DM, HTN, HLD, anemia, hypothyroidism, RA, fibromyalgia, remote cerebral aneurysm repair, acute hypercapnic respiratory failure now with tracheostomy, PEG tube, and bedbound (trach change 8/18, PEG change 8/14), sacral pressure ulcer, BIBEMS from home for 6 day hx of bleeding from the tracheostomy and PEG tube with associated abdominal pain, recent history of auditory and visual hallucinations, and with chronic sacral decubitus ulcer. Exam notable for mild abdominal distention with LLQ and RLQ tenderness, tracheostomy in place with no obvious bleeding, PEG tube with scant amount of dried blood, at baseline neurologic status. Imaging showing no active bleeding around tracheostomy site, however was notable for mild thickening along PEG tube tract, sacral ulcer extending to the coccyx suggestive of possible osteomyelitis, and bibasilar patchy lung opacities with volume loss. Patient admitted for respiratory support, wound care of tracheostomy and PEG, and management of sacral osteomyelitis. No further Trach and peg site bleeding noted since admission.  Pelvic MRI imaging also suggestive of Acute OM. Patient placed on long-term antibiotics with Infectious disease guidance.  Additionally treated with a 7d course of Cefepime due to PSA and Serratia tracheitis on 11/8-->11/15 and then resumed cipro/keflex.  Hospital course c/b Delirium for which Seroquel was added and home Lexapro continued. Tracheostomy changed to #9 Cuffed [place] by ENT 11/3.  Despite trach change patient remained with persistent air leak.  On 11/19, the trach was again changed due to leak and loss of volumes on vent.  Trach was changed to #8 distal cuffed Shiley.  Patient seen by Dermatology for back lesions.  One diagnosed as a seborrheic keratitis and the other a dermatofibroma.  Intermittent abdominal pain throughout hospital course, at times relieved with gas/BM, w/u negative, seen by GI - no recs.  Remains medically stable while pending discharge home.      12/7: 74 y/o F with PMHx of T2DM, HTN, HLD, anemia, hypothyroidism, RA, fibromyalgia, remote cerebral aneurysm repair, acute hypercapnic respiratory failure now with tracheostomy, PEG tube, and bedbound (trach change 8/18, PEG change 8/14), sacral pressure ulcer, BIBEMS from home for 6 day hx of bleeding from the tracheostomy and PEG tube with associated abdominal pain, recent history of auditory and visual hallucinations, and with chronic sacral decubitus ulcer. Exam notable for mild abdominal distention with LLQ and RLQ tenderness, tracheostomy in place with no obvious bleeding, PEG tube with scant amount of dried blood, at baseline neurologic status. Imaging showing no active bleeding around tracheostomy site, however was notable for mild thickening along PEG tube tract, sacral ulcer extending to the coccyx suggestive of possible osteomyelitis, and bibasilar patchy lung opacities with volume loss. Patient admitted for respiratory support, wound care of tracheostomy and PEG, and management of sacral osteomyelitis. No further Trach and peg site bleeding noted since admission.  Pelvic MRI imaging also suggestive of Acute OM. Patient placed on long-term antibiotics with Infectious disease guidance.  Additionally treated with a 7d course of Cefepime due to PSA and Serratia tracheitis on 11/8-->11/15 and then resumed cipro/keflex.  Hospital course c/b Delirium for which Seroquel was added and home Lexapro continued. Tracheostomy changed to #9 Cuffed [place] by ENT 11/3.  Despite trach change patient remained with persistent air leak.  On 11/19, the trach was again changed due to leak and loss of volumes on vent.  Trach was changed to #8 distal cuffed Shiley.  Patient seen by Dermatology for back lesions.  One diagnosed as a seborrheic keratitis and the other a dermatofibroma.  Intermittent abdominal pain throughout hospital course, at times relieved with gas/BM, w/u negative, seen by GI - no recs.  Remains medically stable while pending discharge home.      12/7: 72 y/o F with PMHx of T2DM, HTN, HLD, anemia, hypothyroidism, RA, fibromyalgia, remote cerebral aneurysm repair, acute hypercapnic respiratory failure now with tracheostomy, PEG tube, and bedbound (trach change 8/18, PEG change 8/14), sacral pressure ulcer, BIBEMS from home for 6 day hx of bleeding from the tracheostomy and PEG tube with associated abdominal pain, recent history of auditory and visual hallucinations, and with chronic sacral decubitus ulcer. Exam notable for mild abdominal distention with LLQ and RLQ tenderness, tracheostomy in place with no obvious bleeding, PEG tube with scant amount of dried blood, at baseline neurologic status. Imaging showing no active bleeding around tracheostomy site, however was notable for mild thickening along PEG tube tract, sacral ulcer extending to the coccyx suggestive of possible osteomyelitis, and bibasilar patchy lung opacities with volume loss. Patient admitted for respiratory support, wound care of tracheostomy and PEG, and management of sacral osteomyelitis. No further Trach and peg site bleeding noted since admission.  Pelvic MRI imaging also suggestive of Acute OM. Patient placed on long-term antibiotics with Infectious disease guidance.  Additionally treated with a 7d course of Cefepime due to PSA and Serratia tracheitis on 11/8-->11/15 and then resumed cipro/keflex.  Hospital course c/b Delirium for which Seroquel was added and home Lexapro continued. Tracheostomy changed to #9 Cuffed [place] by ENT 11/3.  Despite trach change patient remained with persistent air leak.  On 11/19, the trach was again changed due to leak and loss of volumes on vent.  Trach was changed to #8 distal cuffed Shiley.  Patient seen by Dermatology for back lesions.  One diagnosed as a seborrheic keratitis and the other a dermatofibroma.  Intermittent abdominal pain throughout hospital course, at times relieved with gas/BM, w/u negative, seen by GI - no recs.  Remains medically stable while pending discharge home.      12/7:  Pt stable throughout the day.  Daughter Marysol has concerns - according to Marysol pts mental status was not at baseline - upon assessment this am and throughout the day, pts mental status continued to be at baseline. 72 y/o F with PMHx of T2DM, HTN, HLD, anemia, hypothyroidism, RA, fibromyalgia, remote cerebral aneurysm repair, acute hypercapnic respiratory failure now with tracheostomy, PEG tube, and bedbound (trach change 8/18, PEG change 8/14), sacral pressure ulcer, BIBEMS from home for 6 day hx of bleeding from the tracheostomy and PEG tube with associated abdominal pain, recent history of auditory and visual hallucinations, and with chronic sacral decubitus ulcer. Exam notable for mild abdominal distention with LLQ and RLQ tenderness, tracheostomy in place with no obvious bleeding, PEG tube with scant amount of dried blood, at baseline neurologic status. Imaging showing no active bleeding around tracheostomy site, however was notable for mild thickening along PEG tube tract, sacral ulcer extending to the coccyx suggestive of possible osteomyelitis, and bibasilar patchy lung opacities with volume loss. Patient admitted for respiratory support, wound care of tracheostomy and PEG, and management of sacral osteomyelitis. No further Trach and peg site bleeding noted since admission.  Pelvic MRI imaging also suggestive of Acute OM. Patient placed on long-term antibiotics with Infectious disease guidance.  Additionally treated with a 7d course of Cefepime due to PSA and Serratia tracheitis on 11/8-->11/15 and then resumed cipro/keflex.  Hospital course c/b Delirium for which Seroquel was added and home Lexapro continued. Tracheostomy changed to #9 Cuffed [place] by ENT 11/3.  Despite trach change patient remained with persistent air leak.  On 11/19, the trach was again changed due to leak and loss of volumes on vent.  Trach was changed to #8 distal cuffed Shiley.  Patient seen by Dermatology for back lesions.  One diagnosed as a seborrheic keratitis and the other a dermatofibroma.  Intermittent abdominal pain throughout hospital course, at times relieved with gas/BM, w/u negative, seen by GI - no recs.  Remains medically stable while pending discharge home.      12/7:  Pt stable throughout the day.  Daughter Marysol has concerns - according to Marysol pts mental status was not at baseline when she had arrived there this am, "she was not responding" - upon assessment this am and throughout the day, pts mental status continued to be at baseline, opening her eyes, following commands on all 4 extremities: pts  and aid were at the bedside, she was smiling and dancing with them as well.  Ventilator tubing was changed, there were no issues on the vent during the day.  Pt pending discharge to home.  74 y/o F with PMHx of T2DM, HTN, HLD, anemia, hypothyroidism, RA, fibromyalgia, remote cerebral aneurysm repair, acute hypercapnic respiratory failure now with tracheostomy, PEG tube, and bedbound (trach change 8/18, PEG change 8/14), sacral pressure ulcer, BIBEMS from home for 6 day hx of bleeding from the tracheostomy and PEG tube with associated abdominal pain, recent history of auditory and visual hallucinations, and with chronic sacral decubitus ulcer. Exam notable for mild abdominal distention with LLQ and RLQ tenderness, tracheostomy in place with no obvious bleeding, PEG tube with scant amount of dried blood, at baseline neurologic status. Imaging showing no active bleeding around tracheostomy site, however was notable for mild thickening along PEG tube tract, sacral ulcer extending to the coccyx suggestive of possible osteomyelitis, and bibasilar patchy lung opacities with volume loss. Patient admitted for respiratory support, wound care of tracheostomy and PEG, and management of sacral osteomyelitis. No further Trach and peg site bleeding noted since admission.  Pelvic MRI imaging also suggestive of Acute OM. Patient placed on long-term antibiotics with Infectious disease guidance.  Additionally treated with a 7d course of Cefepime due to PSA and Serratia tracheitis on 11/8-->11/15 and then resumed cipro/keflex.  Hospital course c/b Delirium for which Seroquel was added and home Lexapro continued. Tracheostomy changed to #9 Cuffed [place] by ENT 11/3.  Despite trach change patient remained with persistent air leak.  On 11/19, the trach was again changed due to leak and loss of volumes on vent.  Trach was changed to #8 distal cuffed Shiley.  Patient seen by Dermatology for back lesions.  One diagnosed as a seborrheic keratitis and the other a dermatofibroma.  Intermittent abdominal pain throughout hospital course, at times relieved with gas/BM, w/u negative, seen by GI - no recs.  Remains medically stable while pending discharge home.      12/7:  Pt stable throughout the day.  Daughter Marysol has concerns - according to Marysol pts mental status was not at baseline when she had arrived there this am, "she was not responding" - upon assessment this am and throughout the day, pts mental status continued to be at baseline, opening her eyes, following commands on all 4 extremities: pts  and aid were at the bedside, she was smiling and dancing with them as well.  Ventilator tubing was changed, there were no issues on the vent during the day.  Pt pending discharge to home.  Pts daughters daughter now flu +.

## 2023-12-07 NOTE — PROGRESS NOTE ADULT - SUBJECTIVE AND OBJECTIVE BOX
Patient is a 73y old  Female who presents with a chief complaint of bleeding (04 Dec 2023 16:57)      Interval Events:    REVIEW OF SYSTEMS:  [ ] Positive  [ ] All other systems negative  [ ] Unable to assess ROS because ________    Vital Signs Last 24 Hrs  T(C): 36.4 (12-07-23 @ 05:15), Max: 37.3 (12-06-23 @ 16:07)  T(F): 97.6 (12-07-23 @ 05:15), Max: 99.1 (12-06-23 @ 16:07)  HR: 89 (12-07-23 @ 05:55) (74 - 97)  BP: 121/58 (12-07-23 @ 05:15) (112/50 - 145/60)  RR: 20 (12-07-23 @ 05:15) (20 - 20)  SpO2: 100% (12-07-23 @ 05:55) (98% - 100%)    PHYSICAL EXAM:  HEENT:   [ ]Tracheostomy:  [ ]Pupils equal  [ ]No oral lesions  [ ]Abnormal    SKIN  [ ]No Rash  [ ] Abnormal  [ ] pressure    CARDIAC  [ ]Regular  [ ]Abnormal    PULMONARY  [ ]Bilateral Clear Breath Sounds  [ ]Normal Excursion  [ ]Abnormal    GI  [ ]PEG      [ ] +BS		              [ ]Soft, nondistended, nontender	  [ ]Abnormal    MUSCULOSKELETAL                                   [ ]Bedbound                 [ ]Abnormal    [ ]Ambulatory/OOB to chair                           EXTREMITIES                                         [ ]Normal  [ ]Edema                           NEUROLOGIC  [ ] Normal, non focal  [ ] Focal findings:    PSYCHIATRIC  [ ]Alert and appropriate  [ ] Sedated	 [ ]Agitated    :  Mcbride: [ ] Yes, if yes: Date of Placement:                   [  ] No    LINES: Central Lines [ ] Yes, if yes: Date of Placement                                     [  ] No    HOSPITAL MEDICATIONS:  MEDICATIONS  (STANDING):  albuterol/ipratropium for Nebulization 3 milliLiter(s) Nebulizer every 6 hours  amLODIPine   Tablet 10 milliGRAM(s) Oral daily  artificial  tears Solution 2 Drop(s) Both EYES four times a day  ascorbic acid 500 milliGRAM(s) Oral daily  calcium carbonate    500 mG (Tums) Chewable 1 Tablet(s) Chew every 12 hours  cephalexin 500 milliGRAM(s) Oral every 6 hours  chlorhexidine 0.12% Liquid 15 milliLiter(s) Oral Mucosa every 12 hours  chlorhexidine 4% Liquid 1 Application(s) Topical <User Schedule>  ciprofloxacin   IVPB 400 milliGRAM(s) IV Intermittent every 12 hours  dextrose 50% Injectable 12.5 Gram(s) IV Push once  dextrose 50% Injectable 25 Gram(s) IV Push once  dextrose 50% Injectable 25 Gram(s) IV Push once  dextrose 50% Injectable 50 milliLiter(s) IV Push once  dextrose Oral Gel 15 Gram(s) Oral once  escitalopram 10 milliGRAM(s) Oral <User Schedule>  fentaNYL   Patch  25 MICROgram(s)/Hr 1 Patch Transdermal every 72 hours  gabapentin Solution 100 milliGRAM(s) Enteral Tube at bedtime  glucagon  Injectable 1 milliGRAM(s) IntraMuscular once  hemorrhoidal Ointment      hemorrhoidal Ointment 1 Application(s) Rectal daily  insulin glargine Injectable (LANTUS) 20 Unit(s) SubCutaneous every morning  insulin lispro (ADMELOG) corrective regimen sliding scale   SubCutaneous every 6 hours  insulin regular  human recombinant 22 Unit(s) SubCutaneous <User Schedule>  insulin regular  human recombinant 11 Unit(s) SubCutaneous <User Schedule>  levothyroxine 125 MICROGram(s) Oral <User Schedule>  lidocaine   4% Patch 1 Patch Transdermal daily  melatonin 1 milliGRAM(s) Oral at bedtime  melatonin 5 milliGRAM(s) Oral at bedtime  multivitamin/minerals/iron Oral Solution (CENTRUM) 15 milliLiter(s) Oral daily  nystatin Powder 1 Application(s) Topical every 8 hours  pantoprazole   Suspension 40 milliGRAM(s) Enteral Tube <User Schedule>  petrolatum Ophthalmic Ointment 1 Application(s) Both EYES four times a day  predniSONE   Tablet 5 milliGRAM(s) Oral daily  QUEtiapine 12.5 milliGRAM(s) Oral <User Schedule>  simethicone 80 milliGRAM(s) Chew four times a day  sodium chloride 3%  Inhalation 4 milliLiter(s) Inhalation every 12 hours  zinc oxide 40% Paste 1 Application(s) Topical daily    MEDICATIONS  (PRN):  acetaminophen   Oral Liquid .. 1000 milliGRAM(s) Enteral Tube every 6 hours PRN Temp greater or equal to 38C (100.4F), Mild Pain (1 - 3)  benzocaine 20% Spray 1 Spray(s) Topical four times a day PRN Cough  diclofenac sodium 1% Gel 2 Gram(s) Topical four times a day PRN pain  diphenhydrAMINE Elixir 25 milliGRAM(s) Oral every 6 hours PRN Rash and/or Itching  guaifenesin/dextromethorphan Oral Liquid 10 milliLiter(s) Oral every 6 hours PRN Cough  sodium chloride 0.65% Nasal 1 Spray(s) Both Nostrils every 6 hours PRN Nasal Congestion      LABS:                        10.5   8.01  )-----------( 131      ( 07 Dec 2023 06:49 )             34.6     12-07    139  |  100  |  22  ----------------------------<  117<H>  3.7   |  28  |  <0.30<L>    Ca    9.9      07 Dec 2023 06:49  Phos  4.8     12-07  Mg     1.9     12-07        Urinalysis Basic - ( 07 Dec 2023 06:49 )    Color: x / Appearance: x / SG: x / pH: x  Gluc: 117 mg/dL / Ketone: x  / Bili: x / Urobili: x   Blood: x / Protein: x / Nitrite: x   Leuk Esterase: x / RBC: x / WBC x   Sq Epi: x / Non Sq Epi: x / Bacteria: x          CAPILLARY BLOOD GLUCOSE    MICROBIOLOGY:     RADIOLOGY:  [ ] Reviewed and interpreted by me    Mode: AC/ CMV (Assist Control/ Continuous Mandatory Ventilation)  RR (machine): 12  TV (machine): 300  FiO2: 30  PEEP: 5  ITime: 1  MAP: 7  PIP: 31   Patient is a 73y old  Female who presents with a chief complaint of bleeding (04 Dec 2023 16:57)      Interval Events:  No acute events overnight.     REVIEW OF SYSTEMS:  [ ] Positive  [X] All other systems negative  [ ] Unable to assess ROS because ________    Vital Signs Last 24 Hrs  T(C): 36.4 (12-07-23 @ 05:15), Max: 37.3 (12-06-23 @ 16:07)  T(F): 97.6 (12-07-23 @ 05:15), Max: 99.1 (12-06-23 @ 16:07)  HR: 89 (12-07-23 @ 05:55) (74 - 97)  BP: 121/58 (12-07-23 @ 05:15) (112/50 - 145/60)  RR: 20 (12-07-23 @ 05:15) (20 - 20)  SpO2: 100% (12-07-23 @ 05:55) (98% - 100%)    PHYSICAL EXAM:  HEENT:   [X]Tracheostomy: #8 distal XLT Shiley  [X]Pupils equal  [ ]No oral lesions  [ ]Abnormal    SKIN  [ ]No Rash  [ ] Abnormal  [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     CARDIAC  [X]Regular  [ ]Abnormal    PULMONARY  [ ]Bilateral Clear Breath Sounds  [ ]Normal Excursion  [X]Abnormal - BL Coarse BS    GI  [X]PEG      [X] +BS		              [X]Soft, nondistended, nontender	  [ ]Abnormal    MUSCULOSKELETAL                                   [X]Bedbound                 [ ]Abnormal    [ ]Ambulatory/OOB to chair                           EXTREMITIES                                         [X]Normal  [ ]Edema                           NEUROLOGIC  [ ] Normal, non focal  [X] Focal findings: opens eyes spontaneously, follows commands on all 4 extremities, able to mouth words    PSYCHIATRIC  [X]Alert and appropriate  [ ] Sedated	 [ ]Agitated    :  Mcbride: [ ] Yes, if yes: Date of Placement:                   [X] No    LINES: Central Lines [ ] Yes, if yes: Date of Placement                                     [X] No    HOSPITAL MEDICATIONS:  MEDICATIONS  (STANDING):  albuterol/ipratropium for Nebulization 3 milliLiter(s) Nebulizer every 6 hours  amLODIPine   Tablet 10 milliGRAM(s) Oral daily  artificial  tears Solution 2 Drop(s) Both EYES four times a day  ascorbic acid 500 milliGRAM(s) Oral daily  calcium carbonate    500 mG (Tums) Chewable 1 Tablet(s) Chew every 12 hours  cephalexin 500 milliGRAM(s) Oral every 6 hours  chlorhexidine 0.12% Liquid 15 milliLiter(s) Oral Mucosa every 12 hours  chlorhexidine 4% Liquid 1 Application(s) Topical <User Schedule>  ciprofloxacin   IVPB 400 milliGRAM(s) IV Intermittent every 12 hours  dextrose 50% Injectable 12.5 Gram(s) IV Push once  dextrose 50% Injectable 25 Gram(s) IV Push once  dextrose 50% Injectable 25 Gram(s) IV Push once  dextrose 50% Injectable 50 milliLiter(s) IV Push once  dextrose Oral Gel 15 Gram(s) Oral once  escitalopram 10 milliGRAM(s) Oral <User Schedule>  fentaNYL   Patch  25 MICROgram(s)/Hr 1 Patch Transdermal every 72 hours  gabapentin Solution 100 milliGRAM(s) Enteral Tube at bedtime  glucagon  Injectable 1 milliGRAM(s) IntraMuscular once  hemorrhoidal Ointment      hemorrhoidal Ointment 1 Application(s) Rectal daily  insulin glargine Injectable (LANTUS) 20 Unit(s) SubCutaneous every morning  insulin lispro (ADMELOG) corrective regimen sliding scale   SubCutaneous every 6 hours  insulin regular  human recombinant 22 Unit(s) SubCutaneous <User Schedule>  insulin regular  human recombinant 11 Unit(s) SubCutaneous <User Schedule>  levothyroxine 125 MICROGram(s) Oral <User Schedule>  lidocaine   4% Patch 1 Patch Transdermal daily  melatonin 1 milliGRAM(s) Oral at bedtime  melatonin 5 milliGRAM(s) Oral at bedtime  multivitamin/minerals/iron Oral Solution (CENTRUM) 15 milliLiter(s) Oral daily  nystatin Powder 1 Application(s) Topical every 8 hours  pantoprazole   Suspension 40 milliGRAM(s) Enteral Tube <User Schedule>  petrolatum Ophthalmic Ointment 1 Application(s) Both EYES four times a day  predniSONE   Tablet 5 milliGRAM(s) Oral daily  QUEtiapine 12.5 milliGRAM(s) Oral <User Schedule>  simethicone 80 milliGRAM(s) Chew four times a day  sodium chloride 3%  Inhalation 4 milliLiter(s) Inhalation every 12 hours  zinc oxide 40% Paste 1 Application(s) Topical daily    MEDICATIONS  (PRN):  acetaminophen   Oral Liquid .. 1000 milliGRAM(s) Enteral Tube every 6 hours PRN Temp greater or equal to 38C (100.4F), Mild Pain (1 - 3)  benzocaine 20% Spray 1 Spray(s) Topical four times a day PRN Cough  diclofenac sodium 1% Gel 2 Gram(s) Topical four times a day PRN pain  diphenhydrAMINE Elixir 25 milliGRAM(s) Oral every 6 hours PRN Rash and/or Itching  guaifenesin/dextromethorphan Oral Liquid 10 milliLiter(s) Oral every 6 hours PRN Cough  sodium chloride 0.65% Nasal 1 Spray(s) Both Nostrils every 6 hours PRN Nasal Congestion      LABS:                        10.5   8.01  )-----------( 131      ( 07 Dec 2023 06:49 )             34.6     12-07    139  |  100  |  22  ----------------------------<  117<H>  3.7   |  28  |  <0.30<L>    Ca    9.9      07 Dec 2023 06:49  Phos  4.8     12-07  Mg     1.9     12-07        Urinalysis Basic - ( 07 Dec 2023 06:49 )    Color: x / Appearance: x / SG: x / pH: x  Gluc: 117 mg/dL / Ketone: x  / Bili: x / Urobili: x   Blood: x / Protein: x / Nitrite: x   Leuk Esterase: x / RBC: x / WBC x   Sq Epi: x / Non Sq Epi: x / Bacteria: x          CAPILLARY BLOOD GLUCOSE    MICROBIOLOGY:     RADIOLOGY:  [ ] Reviewed and interpreted by me    Mode: AC/ CMV (Assist Control/ Continuous Mandatory Ventilation)  RR (machine): 12  TV (machine): 300  FiO2: 30  PEEP: 5  ITime: 1  MAP: 7  PIP: 31

## 2023-12-07 NOTE — CHART NOTE - NSCHARTNOTEFT_GEN_A_CORE
72 y/o F w/h/o T2DM with unknown control due to anemia of chronic disease skewing A1C levels. On Levemir and Humalog insulin PTA. Unknown DM complications. Also h/o HTN, HLD, anemia, hypothyroidism, RA, fibromyalgia, remote cerebral aneurysm repair, acute hypercapnic respiratory failure now with tracheostomy, PEG tube, and bedbound (trach change 8/18, PEG change 8/14), sacral pressure ulcer, BIBEMS from home for 6 day hx of bleeding from the tracheostomy and PEG tube with associated abdominal pain> now resolved. Endocrinology consulted for hyperglycemia. Tolerating TFs form 6am to 2am with BG levels improved today while on present insulin doses. BG goal 100 to low 200s due to age and comorbidities and AMS.    BG 76 at midnight. Rest of BGs in range     Diet, NPO with Tube Feed:   Tube Feeding Modality: Gastrostomy  [employer]App glucose support 1.2  Total Volume for 24 Hours (mL): 1200  Continuous  Starting Tube Feed Rate {mL per Hour}: 60  Increase Tube Feed Rate by (mL): 0  Until Goal Tube Feed Rate (mL per Hour): 60  Tube Feed Duration (in Hours): 20  Tube Feed Start Time: 06:00  Tube Feed Stop Time: 02:00  Free Water Flush  Pump   Rate (mL per Hour): 10   Frequency: Every 2 Hours  Alfred(7 Gm Arginine/7 Gm Glut/1.2 Gm HMB     Qty per Day:  2 (11-29-23 @ 14:12) [Active]      PLAN:  Type 2 diabetes mellitus with hyperglycemia, with long-term current use of insulin.   -Test BG q6h while on TFs/NPO  -Reduce lantus to 16 units daily in am  -C/w Regular insulin 22 at 6am, 11 units at 12 noon and 6pm. PLEASE DO NOT HOLD. CAN GIVE LOWER DOSE IF CONCERN FOR HYPOGLYCEMIA.   -C/W low dose admelog correction scale every 6 hours to low dose since pt hardly needs any correction scale at this time.   -Will follow and adjust insulin as needed    DISCHARGE:  -Will be determined according to BG/insulin needs/TFs and steroid therapy at time of discharge.  -Per pt's daughter, pt can continue with present Lantus/regular insulin dosing upon discharge while on present TF schedule/formula. No need for Admelog correction scale since pt doesn't need it most of the time.   -Needs telemedicine as out pt due to bedbound status. Can follow at [facility] practice. [address]. Phone . Will send email closer to discharge  Make sure pt has Rx for all DM supplies and insulin.     Problem/Plan - 2:  ·  Problem: Hypothyroidism.   ·  Plan: -c/w levothyroxine 125mcg daily   TSH and free thyroxine wnl  Please make sure LT4 is given on an empty stomach at least one hour apart from other meds and food to ensure med absorption. DO NOT GIVE WITH VITAMINS/ANTACIDS  C/w LT4 at 5am and PPI at 8am.     Problem/Plan - 3:  ·  Problem: Secondary adrenal insufficiency.   ·  Plan: Due to chronic steroid use pt is at risk of tertiary AI  -c/w prednisone 5mg daily  -BP stable would not increase dose of prednisone at this time.     Problem/Plan - 4:  ·  Problem: HTN (hypertension).   ·  Plan: BP goal <130/80  Manage per primary team.     Problem/Plan - 5:  ·  Problem: Other hyperlipidemia.   ·  Plan: LDL goal <70 due to DM  Pt LDL NA  Order fasting lipid if not recently done  Consistent moderate intensity Statin if not contraindicated   Manage per primary team   F/u levels as out pt. 74 y/o F w/h/o T2DM with unknown control due to anemia of chronic disease skewing A1C levels. On Levemir and Humalog insulin PTA. Unknown DM complications. Also h/o HTN, HLD, anemia, hypothyroidism, RA, fibromyalgia, remote cerebral aneurysm repair, acute hypercapnic respiratory failure now with tracheostomy, PEG tube, and bedbound (trach change 8/18, PEG change 8/14), sacral pressure ulcer, BIBEMS from home for 6 day hx of bleeding from the tracheostomy and PEG tube with associated abdominal pain> now resolved. Endocrinology consulted for hyperglycemia. Tolerating TFs form 6am to 2am with BG levels improved today while on present insulin doses. BG goal 100 to low 200s due to age and comorbidities and AMS.    BG 76 at midnight. Rest of BGs in range     Diet, NPO with Tube Feed:   Tube Feeding Modality: Gastrostomy  [employer] glucose support 1.2  Total Volume for 24 Hours (mL): 1200  Continuous  Starting Tube Feed Rate {mL per Hour}: 60  Increase Tube Feed Rate by (mL): 0  Until Goal Tube Feed Rate (mL per Hour): 60  Tube Feed Duration (in Hours): 20  Tube Feed Start Time: 06:00  Tube Feed Stop Time: 02:00  Free Water Flush  Pump   Rate (mL per Hour): 10   Frequency: Every 2 Hours  Alfred(7 Gm Arginine/7 Gm Glut/1.2 Gm HMB     Qty per Day:  2 (11-29-23 @ 14:12) [Active]      PLAN:  Type 2 diabetes mellitus with hyperglycemia, with long-term current use of insulin.   -Test BG q6h while on TFs/NPO  -Reduce lantus to 16 units daily in am  -C/w Regular insulin 22 at 6am, 11 units at 12 noon and 6pm. PLEASE DO NOT HOLD. CAN GIVE LOWER DOSE IF CONCERN FOR HYPOGLYCEMIA.   -C/W low dose admelog correction scale every 6 hours to low dose since pt hardly needs any correction scale at this time.   -Will follow and adjust insulin as needed    DISCHARGE:  -Will be determined according to BG/insulin needs/TFs and steroid therapy at time of discharge.  -Per pt's daughter, pt can continue with present Lantus/regular insulin dosing upon discharge while on present TF schedule/formula. No need for Admelog correction scale since pt doesn't need it most of the time.   -Needs telemedicine as out pt due to bedbound status. Can follow at [facility] practice. [address]. Phone . Will send email closer to discharge  Make sure pt has Rx for all DM supplies and insulin.     Problem/Plan - 2:  ·  Problem: Hypothyroidism.   ·  Plan: -c/w levothyroxine 125mcg daily   TSH and free thyroxine wnl  Please make sure LT4 is given on an empty stomach at least one hour apart from other meds and food to ensure med absorption. DO NOT GIVE WITH VITAMINS/ANTACIDS  C/w LT4 at 5am and PPI at 8am.     Problem/Plan - 3:  ·  Problem: Secondary adrenal insufficiency.   ·  Plan: Due to chronic steroid use pt is at risk of tertiary AI  -c/w prednisone 5mg daily  -BP stable would not increase dose of prednisone at this time.     Problem/Plan - 4:  ·  Problem: HTN (hypertension).   ·  Plan: BP goal <130/80  Manage per primary team.     Problem/Plan - 5:  ·  Problem: Other hyperlipidemia.   ·  Plan: LDL goal <70 due to DM  Pt LDL NA  Order fasting lipid if not recently done  Consistent moderate intensity Statin if not contraindicated   Manage per primary team   F/u levels as out pt.

## 2023-12-08 LAB
GLUCOSE BLDC GLUCOMTR-MCNC: 113 MG/DL — HIGH (ref 70–99)
GLUCOSE BLDC GLUCOMTR-MCNC: 113 MG/DL — HIGH (ref 70–99)
GLUCOSE BLDC GLUCOMTR-MCNC: 139 MG/DL — HIGH (ref 70–99)
GLUCOSE BLDC GLUCOMTR-MCNC: 139 MG/DL — HIGH (ref 70–99)
GLUCOSE BLDC GLUCOMTR-MCNC: 177 MG/DL — HIGH (ref 70–99)
GLUCOSE BLDC GLUCOMTR-MCNC: 177 MG/DL — HIGH (ref 70–99)
GLUCOSE BLDC GLUCOMTR-MCNC: 188 MG/DL — HIGH (ref 70–99)
GLUCOSE BLDC GLUCOMTR-MCNC: 188 MG/DL — HIGH (ref 70–99)

## 2023-12-08 RX ADMIN — QUETIAPINE FUMARATE 12.5 MILLIGRAM(S): 200 TABLET, FILM COATED ORAL at 17:13

## 2023-12-08 RX ADMIN — SODIUM CHLORIDE 4 MILLILITER(S): 9 INJECTION INTRAMUSCULAR; INTRAVENOUS; SUBCUTANEOUS at 17:23

## 2023-12-08 RX ADMIN — Medication 500 MILLIGRAM(S): at 11:33

## 2023-12-08 RX ADMIN — Medication 15 MILLILITER(S): at 11:33

## 2023-12-08 RX ADMIN — ZINC OXIDE 1 APPLICATION(S): 200 OINTMENT TOPICAL at 11:36

## 2023-12-08 RX ADMIN — Medication 1 APPLICATION(S): at 11:34

## 2023-12-08 RX ADMIN — SIMETHICONE 80 MILLIGRAM(S): 80 TABLET, CHEWABLE ORAL at 11:33

## 2023-12-08 RX ADMIN — PANTOPRAZOLE SODIUM 40 MILLIGRAM(S): 20 TABLET, DELAYED RELEASE ORAL at 08:42

## 2023-12-08 RX ADMIN — Medication 1 APPLICATION(S): at 05:05

## 2023-12-08 RX ADMIN — INSULIN HUMAN 22 UNIT(S): 100 INJECTION, SOLUTION SUBCUTANEOUS at 05:06

## 2023-12-08 RX ADMIN — ESCITALOPRAM OXALATE 10 MILLIGRAM(S): 10 TABLET, FILM COATED ORAL at 08:40

## 2023-12-08 RX ADMIN — Medication 3 MILLILITER(S): at 05:29

## 2023-12-08 RX ADMIN — CARBAMIDE PEROXIDE 1 DROP(S): 81.86 SOLUTION/ DROPS AURICULAR (OTIC) at 17:30

## 2023-12-08 RX ADMIN — LIDOCAINE 1 PATCH: 4 CREAM TOPICAL at 23:45

## 2023-12-08 RX ADMIN — AMLODIPINE BESYLATE 10 MILLIGRAM(S): 2.5 TABLET ORAL at 05:04

## 2023-12-08 RX ADMIN — Medication 2 DROP(S): at 11:34

## 2023-12-08 RX ADMIN — Medication 1 TABLET(S): at 17:12

## 2023-12-08 RX ADMIN — INSULIN HUMAN 11 UNIT(S): 100 INJECTION, SOLUTION SUBCUTANEOUS at 17:55

## 2023-12-08 RX ADMIN — Medication 3 MILLILITER(S): at 23:45

## 2023-12-08 RX ADMIN — Medication 5 MILLIGRAM(S): at 21:28

## 2023-12-08 RX ADMIN — Medication 1 TABLET(S): at 05:04

## 2023-12-08 RX ADMIN — INSULIN GLARGINE 16 UNIT(S): 100 INJECTION, SOLUTION SUBCUTANEOUS at 08:53

## 2023-12-08 RX ADMIN — LIDOCAINE 1 PATCH: 4 CREAM TOPICAL at 00:27

## 2023-12-08 RX ADMIN — NYSTATIN CREAM 1 APPLICATION(S): 100000 CREAM TOPICAL at 05:05

## 2023-12-08 RX ADMIN — Medication 1: at 11:35

## 2023-12-08 RX ADMIN — PHENYLEPHRINE-SHARK LIVER OIL-MINERAL OIL-PETROLATUM RECTAL OINTMENT 1 APPLICATION(S): at 11:47

## 2023-12-08 RX ADMIN — SODIUM CHLORIDE 4 MILLILITER(S): 9 INJECTION INTRAMUSCULAR; INTRAVENOUS; SUBCUTANEOUS at 05:30

## 2023-12-08 RX ADMIN — Medication 2 DROP(S): at 05:05

## 2023-12-08 RX ADMIN — CHLORHEXIDINE GLUCONATE 15 MILLILITER(S): 213 SOLUTION TOPICAL at 17:14

## 2023-12-08 RX ADMIN — CHLORHEXIDINE GLUCONATE 15 MILLILITER(S): 213 SOLUTION TOPICAL at 05:04

## 2023-12-08 RX ADMIN — Medication 1 MILLIGRAM(S): at 21:28

## 2023-12-08 RX ADMIN — INSULIN HUMAN 11 UNIT(S): 100 INJECTION, SOLUTION SUBCUTANEOUS at 11:34

## 2023-12-08 RX ADMIN — Medication 200 MILLIGRAM(S): at 17:11

## 2023-12-08 RX ADMIN — SIMETHICONE 80 MILLIGRAM(S): 80 TABLET, CHEWABLE ORAL at 05:04

## 2023-12-08 RX ADMIN — Medication 500 MILLIGRAM(S): at 17:14

## 2023-12-08 RX ADMIN — LIDOCAINE 1 PATCH: 4 CREAM TOPICAL at 20:52

## 2023-12-08 RX ADMIN — Medication 1 APPLICATION(S): at 17:12

## 2023-12-08 RX ADMIN — Medication 3 MILLILITER(S): at 11:05

## 2023-12-08 RX ADMIN — CARBAMIDE PEROXIDE 1 DROP(S): 81.86 SOLUTION/ DROPS AURICULAR (OTIC) at 06:04

## 2023-12-08 RX ADMIN — GABAPENTIN 100 MILLIGRAM(S): 400 CAPSULE ORAL at 21:30

## 2023-12-08 RX ADMIN — SIMETHICONE 80 MILLIGRAM(S): 80 TABLET, CHEWABLE ORAL at 17:12

## 2023-12-08 RX ADMIN — Medication 200 MILLIGRAM(S): at 05:06

## 2023-12-08 RX ADMIN — CHLORHEXIDINE GLUCONATE 1 APPLICATION(S): 213 SOLUTION TOPICAL at 05:08

## 2023-12-08 RX ADMIN — LIDOCAINE 1 PATCH: 4 CREAM TOPICAL at 11:34

## 2023-12-08 RX ADMIN — Medication 125 MICROGRAM(S): at 05:04

## 2023-12-08 RX ADMIN — NYSTATIN CREAM 1 APPLICATION(S): 100000 CREAM TOPICAL at 21:28

## 2023-12-08 RX ADMIN — NYSTATIN CREAM 1 APPLICATION(S): 100000 CREAM TOPICAL at 17:12

## 2023-12-08 RX ADMIN — Medication 3 MILLILITER(S): at 17:23

## 2023-12-08 RX ADMIN — Medication 5 MILLIGRAM(S): at 05:04

## 2023-12-08 RX ADMIN — Medication 2 DROP(S): at 17:13

## 2023-12-08 RX ADMIN — Medication 500 MILLIGRAM(S): at 05:04

## 2023-12-08 NOTE — PROGRESS NOTE ADULT - NS ATTEND AMEND GEN_ALL_CORE FT
73F with a h/o DM, HTN, HLD, anemia, hypothyroidism, RA, fibromyalgia, remote cerebral aneurysm with repair, hypercapnic respiratory failure s/p tracheostomy/PEG, bedbound, sacral pressure ulcer. Admitted w/ bleeding from tracheostomy and PEG w/ associated abdominal pain, recent hx of auditory/visual hallucinations. Since admission, no further bleeding noted from either trach or PEG. Imaging showed mild thickening along PEG tube tract and sacral ulcer extending to coccyx suggestive of OM.     # Osteomyelitis  # Chronic hypoxemic RF  # oropharyngeal dysphagia  # acute on chronic pain  # Trach/Peg bleeding  # Tracheitis with Pseudomonas and serratia  # Hx of hallucination, anxiety     #Neuro - awake, alert, and interactive. moving all extremities  Continue current medications  - Behavioral Health Follow-up as needed   #Cardiovascular - hemodynamically stable  #Pulmonary - chronic hypoxemic respiratory failure, has 8XLT trach, on home vent, c/w PSV as able though still requiring high support  #Gastro - oropharyngeal dysphagia with PEG tube in place, tolerating feeds, nutrition follow up appreciated   - Abdominal pain- improved with voiding, no significant urinary retention, cont to hold Oxybutinin  Abdominal sono 12/1 and CT abdomen 12/4- No acute intra-abdominal findings.   - History of c. diff previously treated per daughter - normal BMS, if diarrhea recurs off stool softeners will need to repeat testing for this.   #Infectious Disease - sacral osteo on MRI - wound care follow-up appreciated, c/w offloading and local wound care  - On review of wound care notes there does appear to be some improvement in at least one dimension of wound from admission measurements. The patient has had this wound since a hospitalization in December 2022 and per daughter does not have chronic or multiple wounds but only this single wound.   - C/w 6 week course of Cipro and Keflex as per ID - (to complete approx 12/10).   - Per daughter, patient has had prior multiple infections during hospitalizations including prior pseudomonas, MRSA, E faecalis, and Klebsiella  - Sputum colonized with MDR Pseudomonas   - Daughter reports a recent dental abscess and being told by outpatient dentist that patient needed teeth extracted - Dental evaluation noted with no plans for intervention as inpatient.   #Hem/Onc - prior cytopenias in setting of antibiotics per patient's daughter   - Hem/Onc follow-up noted - suspect an anemia of chronic disease  #Pain - continue current pain control - in setting of fibromyalgia and pain from wound  - Voltaren topical, lidocaine patches and low dose Oxycodone as needed  #Derm - previously seen by derm for skin lesions - mid back with irritated seborrheic keratosis - outpatient follow-up  #Endo - DM2 - continue insulin and adjust as needed for goal FS < 180  - Endocrine follow-up appreciate   - Continue Synthroid - TSH WNL  #DVT proph - SCDs  - Prior prophylactic AC stopped after concern for prior bleeding    Dispo - full code. Medically stable for discharge.

## 2023-12-08 NOTE — PROGRESS NOTE ADULT - PROBLEM SELECTOR PLAN 3
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  11/18 RR increased to 14 due to hypercarbia from trach collar trial  - Tolerates intermittent PSV 15/5 during day/ Vent HS  - Airway Clearance: Duoneb, Hypersal, Chest PT, PRN suctioning   - Maintain 02 sat > 92%  Tracheal Bleeding (Intermittent)-->resolved since admission   -S/p CT Angio neck: No evidence of active bleeding around tracheostomy site. No hematoma.  No collection   - Seen by ENT; Kath and b/l bronchi visualized without any trauma. small amount of blood tinged secretions resting at beginning of right bronchus not actively bleeding.  - 11/3 trach changed to #9 Portex by ENT  - 11/17: Due to persistent air leak and volume loss on vent, trach again changed by ENT to #8 distal cuffed Shiley.  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  - tolerates PS 15/5

## 2023-12-08 NOTE — PROGRESS NOTE ADULT - SUBJECTIVE AND OBJECTIVE BOX
Patient is a 73y old  Female who presents with a chief complaint of bleeding (04 Dec 2023 16:57)    HPI:  72 y/o F with PMHx of T2DM, HTN, HLD, anemia, hypothyroidism, RA, fibromyalgia, remote cerebral aneurysm repair, acute hypercapnic respiratory failure now with tracheostomy, PEG tube, and bedbound (trach change 8/18, PEG change 8/14), sacral pressure ulcer, BIBEMS from home for 6 day hx of bleeding from the tracheostomy and PEG tube with associated abdominal pain. Patient still having regular bowel movements, last one occurred prior to ED arrival. Was seen outpatient on 10/26 by critical care Dr. Zaki Gottlieb and apparently had cultures sent at that time. Patient also noted to have chronic sacral decubitous ulcer. Family endorsed one episode of fever, though was not febrile on evaluation. Daughter noted patient was recently experiencing auditory and visual hallucinations (seeing animals that were not there & hearing a "bomb" going off outside her home), though patient not actively hallucinating on evaluation.  ED course notable for CT neck imaging showing no evidence of active bleeding around the tracheostomy site, no hematomas, and no drainable collection around the tracheostomy site. CT abdomen/pelvis notable for gastrostomy tube localized to the stomach with mild thickening noted along the tract; a sacral decubitus ulcer extending to the coccyx with osseous remodeling, possibly representing osteomyelitis; and bibasilar patchy opacities with volume loss in the lungs, representing atelectasis vs infection. Patient admitted for respiratory support, wound care of tracheostomy and PEG, and management of presumed sacral osteomyelitis.   (28 Oct 2023 04:18)    Interval Events:    REVIEW OF SYSTEMS:  [ ] Positive  [ ] All other systems negative  [ ] Unable to assess ROS because ________    Vital Signs Last 24 Hrs  T(C): 36.6 (12-08-23 @ 05:04), Max: 36.8 (12-07-23 @ 17:20)  T(F): 97.9 (12-08-23 @ 05:04), Max: 98.3 (12-07-23 @ 17:20)  HR: 77 (12-08-23 @ 05:04) (63 - 100)  BP: 144/69 (12-08-23 @ 05:04) (103/52 - 144/69)  RR: 20 (12-08-23 @ 05:04) (20 - 20)  SpO2: 100% (12-08-23 @ 05:04) (100% - 100%)    PHYSICAL EXAM:  HEENT:   [ ]Tracheostomy:  [ ]Pupils equal  [ ]No oral lesions  [ ]Abnormal    SKIN  [ ] No Rash  [ ] Abnormal  [ ] pressure    CARDIAC  [ ]Regular  [ ]Abnormal    PULMONARY  [ ]Bilateral Clear Breath Sounds  [ ]Normal Excursion  [ ]Abnormal    GI  [ ]PEG      [ ] +BS		              [ ]Soft, nondistended, nontender	  [ ]Abnormal    MUSCULOSKELETAL                                   [ ]Bedbound                 [ ]Abnormal    [ ]Ambulatory/OOB to chair                           EXTREMITIES                                         [ ]Normal  [ ]Edema                           NEUROLOGIC  [ ] Normal, non focal  [ ] Focal findings:    PSYCHIATRIC  [ ]Alert and appropriate  [ ] Sedated	 [ ]Agitated    :  Mcbride: [ ] Yes, if yes: Date of Placement:                   [  ] No    LINES: Central Lines [ ] Yes, if yes: Date of Placement                                     [  ] No    HOSPITAL MEDICATIONS:  MEDICATIONS  (STANDING):  albuterol/ipratropium for Nebulization 3 milliLiter(s) Nebulizer every 6 hours  amLODIPine   Tablet 10 milliGRAM(s) Oral daily  artificial  tears Solution 2 Drop(s) Both EYES four times a day  ascorbic acid 500 milliGRAM(s) Oral daily  calcium carbonate    500 mG (Tums) Chewable 1 Tablet(s) Chew every 12 hours  carbamide peroxide Otic Solution 1 Drop(s) Both Ears every 12 hours  cephalexin 500 milliGRAM(s) Oral every 6 hours  chlorhexidine 0.12% Liquid 15 milliLiter(s) Oral Mucosa every 12 hours  chlorhexidine 4% Liquid 1 Application(s) Topical <User Schedule>  ciprofloxacin   IVPB 400 milliGRAM(s) IV Intermittent every 12 hours  dextrose 50% Injectable 25 Gram(s) IV Push once  dextrose 50% Injectable 12.5 Gram(s) IV Push once  dextrose 50% Injectable 50 milliLiter(s) IV Push once  dextrose 50% Injectable 25 Gram(s) IV Push once  dextrose Oral Gel 15 Gram(s) Oral once  escitalopram 10 milliGRAM(s) Oral <User Schedule>  fentaNYL   Patch  25 MICROgram(s)/Hr 1 Patch Transdermal every 72 hours  gabapentin Solution 100 milliGRAM(s) Enteral Tube at bedtime  glucagon  Injectable 1 milliGRAM(s) IntraMuscular once  hemorrhoidal Ointment      hemorrhoidal Ointment 1 Application(s) Rectal daily  insulin glargine Injectable (LANTUS) 16 Unit(s) SubCutaneous every morning  insulin lispro (ADMELOG) corrective regimen sliding scale   SubCutaneous every 6 hours  insulin regular  human recombinant 11 Unit(s) SubCutaneous <User Schedule>  insulin regular  human recombinant 22 Unit(s) SubCutaneous <User Schedule>  levothyroxine 125 MICROGram(s) Oral <User Schedule>  lidocaine   4% Patch 1 Patch Transdermal daily  melatonin 5 milliGRAM(s) Oral at bedtime  melatonin 1 milliGRAM(s) Oral at bedtime  multivitamin/minerals/iron Oral Solution (CENTRUM) 15 milliLiter(s) Oral daily  nystatin Powder 1 Application(s) Topical every 8 hours  pantoprazole   Suspension 40 milliGRAM(s) Enteral Tube <User Schedule>  petrolatum Ophthalmic Ointment 1 Application(s) Both EYES four times a day  predniSONE   Tablet 5 milliGRAM(s) Oral daily  QUEtiapine 12.5 milliGRAM(s) Oral <User Schedule>  simethicone 80 milliGRAM(s) Chew four times a day  sodium chloride 3%  Inhalation 4 milliLiter(s) Inhalation every 12 hours  zinc oxide 40% Paste 1 Application(s) Topical daily    MEDICATIONS  (PRN):  acetaminophen   Oral Liquid .. 1000 milliGRAM(s) Enteral Tube every 6 hours PRN Temp greater or equal to 38C (100.4F), Mild Pain (1 - 3)  benzocaine 20% Spray 1 Spray(s) Topical four times a day PRN Cough  diclofenac sodium 1% Gel 2 Gram(s) Topical four times a day PRN pain  diphenhydrAMINE Elixir 25 milliGRAM(s) Oral every 6 hours PRN Rash and/or Itching  guaifenesin/dextromethorphan Oral Liquid 10 milliLiter(s) Oral every 6 hours PRN Cough  sodium chloride 0.65% Nasal 1 Spray(s) Both Nostrils every 6 hours PRN Nasal Congestion      LABS:                        10.5   8.01  )-----------( 131      ( 07 Dec 2023 06:49 )             34.6     12-07    139  |  100  |  22  ----------------------------<  117<H>  3.7   |  28  |  <0.30<L>    Ca    9.9      07 Dec 2023 06:49  Phos  4.8     12-07  Mg     1.9     12-07        Urinalysis Basic - ( 07 Dec 2023 06:49 )    Color: x / Appearance: x / SG: x / pH: x  Gluc: 117 mg/dL / Ketone: x  / Bili: x / Urobili: x   Blood: x / Protein: x / Nitrite: x   Leuk Esterase: x / RBC: x / WBC x   Sq Epi: x / Non Sq Epi: x / Bacteria: x      Mode: AC/ CMV (Assist Control/ Continuous Mandatory Ventilation)  RR (machine): 12  TV (machine): 300  FiO2: 30  PEEP: 5  ITime: 1  MAP: 8  PIP: 25   Patient is a 73y old  Female who presents with a chief complaint of bleeding (04 Dec 2023 16:57)    HPI:  72 y/o F with PMHx of T2DM, HTN, HLD, anemia, hypothyroidism, RA, fibromyalgia, remote cerebral aneurysm repair, acute hypercapnic respiratory failure now with tracheostomy, PEG tube, and bedbound (trach change 8/18, PEG change 8/14), sacral pressure ulcer, BIBEMS from home for 6 day hx of bleeding from the tracheostomy and PEG tube with associated abdominal pain. Patient still having regular bowel movements, last one occurred prior to ED arrival. Was seen outpatient on 10/26 by critical care Dr. Zaki Gottlieb and apparently had cultures sent at that time. Patient also noted to have chronic sacral decubitus ulcer. Family endorsed one episode of fever, though was not febrile on evaluation. Daughter noted patient was recently experiencing auditory and visual hallucinations (seeing animals that were not there & hearing a "bomb" going off outside her home), though patient not actively hallucinating on evaluation.  ED course notable for CT neck imaging showing no evidence of active bleeding around the tracheostomy site, no hematomas, and no drainable collection around the tracheostomy site. CT abdomen/pelvis notable for gastrostomy tube localized to the stomach with mild thickening noted along the tract; a sacral decubitus ulcer extending to the coccyx with osseous remodeling, possibly representing osteomyelitis; and bibasilar patchy opacities with volume loss in the lungs, representing atelectasis vs infection. Patient admitted for respiratory support, wound care of tracheostomy and PEG, and management of presumed sacral osteomyelitis. (28 Oct 2023 04:18)    Interval Events: no issues overnight    REVIEW OF SYSTEMS:  [ ] Positive  [x] All other systems negative  [ ] Unable to assess ROS because ________    Vital Signs Last 24 Hrs  T(C): 36.6 (12-08-23 @ 05:04), Max: 36.8 (12-07-23 @ 17:20)  T(F): 97.9 (12-08-23 @ 05:04), Max: 98.3 (12-07-23 @ 17:20)  HR: 77 (12-08-23 @ 05:04) (63 - 100)  BP: 144/69 (12-08-23 @ 05:04) (103/52 - 144/69)  RR: 20 (12-08-23 @ 05:04) (20 - 20)  SpO2: 100% (12-08-23 @ 05:04) (100% - 100%)    PHYSICAL EXAM:  HEENT:   [X]Tracheostomy: #8 distal XLT [place]  [X]Pupils equal  [ ]No oral lesions  [ ]Abnormal    SKIN  [ ]No Rash  [ ] Abnormal  [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     CARDIAC  [X]Regular  [ ]Abnormal    PULMONARY  [ ]Bilateral Clear Breath Sounds  [ ]Normal Excursion  [X]Abnormal - BL Coarse BS    GI  [X]PEG      [X] +BS		              [X]Soft, nondistended, nontender	  [ ]Abnormal    MUSCULOSKELETAL                                   [X]Bedbound                 [ ]Abnormal    [ ]Ambulatory/OOB to chair                           EXTREMITIES                                         [X]Normal  [ ]Edema                           NEUROLOGIC  [ ] Normal, non focal  [X] Focal findings: opens eyes spontaneously, follows commands on all 4 extremities, able to mouth words    PSYCHIATRIC  [X]Alert and appropriate  [ ] Sedated	 [ ]Agitated    :  Mcbride: [ ] Yes, if yes: Date of Placement:                   [X] No    LINES: Central Lines [ ] Yes, if yes: Date of Placement                                     [X] No    HOSPITAL MEDICATIONS:  MEDICATIONS  (STANDING):  albuterol/ipratropium for Nebulization 3 milliLiter(s) Nebulizer every 6 hours  amLODIPine   Tablet 10 milliGRAM(s) Oral daily  artificial  tears Solution 2 Drop(s) Both EYES four times a day  ascorbic acid 500 milliGRAM(s) Oral daily  calcium carbonate    500 mG (Tums) Chewable 1 Tablet(s) Chew every 12 hours  carbamide peroxide Otic Solution 1 Drop(s) Both Ears every 12 hours  cephalexin 500 milliGRAM(s) Oral every 6 hours  chlorhexidine 0.12% Liquid 15 milliLiter(s) Oral Mucosa every 12 hours  chlorhexidine 4% Liquid 1 Application(s) Topical <User Schedule>  ciprofloxacin   IVPB 400 milliGRAM(s) IV Intermittent every 12 hours  dextrose 50% Injectable 25 Gram(s) IV Push once  dextrose 50% Injectable 12.5 Gram(s) IV Push once  dextrose 50% Injectable 50 milliLiter(s) IV Push once  dextrose 50% Injectable 25 Gram(s) IV Push once  dextrose Oral Gel 15 Gram(s) Oral once  escitalopram 10 milliGRAM(s) Oral <User Schedule>  fentaNYL   Patch  25 MICROgram(s)/Hr 1 Patch Transdermal every 72 hours  gabapentin Solution 100 milliGRAM(s) Enteral Tube at bedtime  glucagon  Injectable 1 milliGRAM(s) IntraMuscular once  hemorrhoidal Ointment      hemorrhoidal Ointment 1 Application(s) Rectal daily  insulin glargine Injectable (LANTUS) 16 Unit(s) SubCutaneous every morning  insulin lispro (ADMELOG) corrective regimen sliding scale   SubCutaneous every 6 hours  insulin regular  human recombinant 11 Unit(s) SubCutaneous <User Schedule>  insulin regular  human recombinant 22 Unit(s) SubCutaneous <User Schedule>  levothyroxine 125 MICROGram(s) Oral <User Schedule>  lidocaine   4% Patch 1 Patch Transdermal daily  melatonin 5 milliGRAM(s) Oral at bedtime  melatonin 1 milliGRAM(s) Oral at bedtime  multivitamin/minerals/iron Oral Solution (CENTRUM) 15 milliLiter(s) Oral daily  nystatin Powder 1 Application(s) Topical every 8 hours  pantoprazole   Suspension 40 milliGRAM(s) Enteral Tube <User Schedule>  petrolatum Ophthalmic Ointment 1 Application(s) Both EYES four times a day  predniSONE   Tablet 5 milliGRAM(s) Oral daily  QUEtiapine 12.5 milliGRAM(s) Oral <User Schedule>  simethicone 80 milliGRAM(s) Chew four times a day  sodium chloride 3%  Inhalation 4 milliLiter(s) Inhalation every 12 hours  zinc oxide 40% Paste 1 Application(s) Topical daily    MEDICATIONS  (PRN):  acetaminophen   Oral Liquid .. 1000 milliGRAM(s) Enteral Tube every 6 hours PRN Temp greater or equal to 38C (100.4F), Mild Pain (1 - 3)  benzocaine 20% Spray 1 Spray(s) Topical four times a day PRN Cough  diclofenac sodium 1% Gel 2 Gram(s) Topical four times a day PRN pain  diphenhydrAMINE Elixir 25 milliGRAM(s) Oral every 6 hours PRN Rash and/or Itching  guaifenesin/dextromethorphan Oral Liquid 10 milliLiter(s) Oral every 6 hours PRN Cough  sodium chloride 0.65% Nasal 1 Spray(s) Both Nostrils every 6 hours PRN Nasal Congestion      LABS:                        10.5   8.01  )-----------( 131      ( 07 Dec 2023 06:49 )             34.6     12-07    139  |  100  |  22  ----------------------------<  117<H>  3.7   |  28  |  <0.30<L>    Ca    9.9      07 Dec 2023 06:49  Phos  4.8     12-07  Mg     1.9     12-07        Urinalysis Basic - ( 07 Dec 2023 06:49 )    Color: x / Appearance: x / SG: x / pH: x  Gluc: 117 mg/dL / Ketone: x  / Bili: x / Urobili: x   Blood: x / Protein: x / Nitrite: x   Leuk Esterase: x / RBC: x / WBC x   Sq Epi: x / Non Sq Epi: x / Bacteria: x      Mode: AC/ CMV (Assist Control/ Continuous Mandatory Ventilation)  RR (machine): 12  TV (machine): 300  FiO2: 30  PEEP: 5  ITime: 1  MAP: 8  PIP: 25

## 2023-12-08 NOTE — PROGRESS NOTE ADULT - ASSESSMENT
72 y/o F with PMHx of T2DM, HTN, HLD, anemia, hypothyroidism, RA, fibromyalgia, remote cerebral aneurysm repair, acute hypercapnic respiratory failure now with tracheostomy, PEG tube, and bedbound (trach change 8/18, PEG change 8/14), sacral pressure ulcer, BIBEMS from home for 6 day hx of bleeding from the tracheostomy and PEG tube with associated abdominal pain, recent history of auditory and visual hallucinations, and with chronic sacral decubitus ulcer. Exam notable for mild abdominal distention with LLQ and RLQ tenderness, tracheostomy in place with no obvious bleeding, PEG tube with scant amount of dried blood, at baseline neurologic status. Imaging showing no active bleeding around tracheostomy site, however was notable for mild thickening along PEG tube tract, sacral ulcer extending to the coccyx suggestive of possible osteomyelitis, and bibasilar patchy lung opacities with volume loss. Patient admitted for respiratory support, wound care of tracheostomy and PEG, and management of sacral osteomyelitis. No further Trach and peg site bleeding noted since admission.  Pelvic MRI imaging also suggestive of Acute OM. Patient placed on long-term antibiotics with Infectious disease guidance.  Additionally treated with a 7d course of Cefepime due to PSA and Serratia tracheitis on 11/8-->11/15 and then resumed cipro/keflex.  Hospital course c/b Delirium for which Seroquel was added and home Lexapro continued. Tracheostomy changed to #9 Cuffed [place] by ENT 11/3.  Despite trach change patient remained with persistent air leak.  On 11/19, the trach was again changed due to leak and loss of volumes on vent.  Trach was changed to #8 distal cuffed [place].  Patient seen by Dermatology for back lesions.  One diagnosed as a seborrheic keratitis and the other a dermatofibroma.  Intermittent abdominal pain throughout hospital course, at times relieved with gas/BM, w/u negative, seen by GI - no recs.  Remains medically stable while pending discharge home.      12/7:  Pt stable throughout the day.  Daughter Marysol has concerns - according to Marysol pts mental status was not at baseline when she had arrived there this am, "she was not responding" - upon assessment this am and throughout the day, pts mental status continued to be at baseline, opening her eyes, following commands on all 4 extremities: pts  and aid were at the bedside, she was smiling and dancing with them as well.  Ventilator tubing was changed, there were no issues on the vent during the day.  Pt pending discharge to home.  Pts daughters daughter now flu +.   74 y/o F with PMHx of T2DM, HTN, HLD, anemia, hypothyroidism, RA, fibromyalgia, remote cerebral aneurysm repair, acute hypercapnic respiratory failure now with tracheostomy, PEG tube, and bedbound (trach change 8/18, PEG change 8/14), sacral pressure ulcer, BIBEMS from home for 6 day hx of bleeding from the tracheostomy and PEG tube with associated abdominal pain, recent history of auditory and visual hallucinations, and with chronic sacral decubitus ulcer. Exam notable for mild abdominal distention with LLQ and RLQ tenderness, tracheostomy in place with no obvious bleeding, PEG tube with scant amount of dried blood, at baseline neurologic status. Imaging showing no active bleeding around tracheostomy site, however was notable for mild thickening along PEG tube tract, sacral ulcer extending to the coccyx suggestive of possible osteomyelitis, and bibasilar patchy lung opacities with volume loss. Patient admitted for respiratory support, wound care of tracheostomy and PEG, and management of sacral osteomyelitis. No further Trach and peg site bleeding noted since admission.  Pelvic MRI imaging also suggestive of Acute OM. Patient placed on long-term antibiotics with Infectious disease guidance.  Additionally treated with a 7d course of Cefepime due to PSA and Serratia tracheitis on 11/8-->11/15 and then resumed cipro/keflex.  Hospital course c/b Delirium for which Seroquel was added and home Lexapro continued. Tracheostomy changed to #9 Cuffed [place] by ENT 11/3.  Despite trach change patient remained with persistent air leak.  On 11/19, the trach was again changed due to leak and loss of volumes on vent.  Trach was changed to #8 distal cuffed [place].  Patient seen by Dermatology for back lesions.  One diagnosed as a seborrheic keratitis and the other a dermatofibroma.  Intermittent abdominal pain throughout hospital course, at times relieved with gas/BM, w/u negative, seen by GI - no recs.  Remains medically stable while pending discharge home.      12/8: Pts daughters daughter now flu +.   72 y/o F with PMHx of T2DM, HTN, HLD, anemia, hypothyroidism, RA, fibromyalgia, remote cerebral aneurysm repair, acute hypercapnic respiratory failure now with tracheostomy, PEG tube, and bedbound (trach change 8/18, PEG change 8/14), sacral pressure ulcer, BIBEMS from home for 6 day hx of bleeding from the tracheostomy and PEG tube with associated abdominal pain, recent history of auditory and visual hallucinations, and with chronic sacral decubitus ulcer. Exam notable for mild abdominal distention with LLQ and RLQ tenderness, tracheostomy in place with no obvious bleeding, PEG tube with scant amount of dried blood, at baseline neurologic status. Imaging showing no active bleeding around tracheostomy site, however was notable for mild thickening along PEG tube tract, sacral ulcer extending to the coccyx suggestive of possible osteomyelitis, and bibasilar patchy lung opacities with volume loss. Patient admitted for respiratory support, wound care of tracheostomy and PEG, and management of sacral osteomyelitis. No further Trach and peg site bleeding noted since admission.  Pelvic MRI imaging also suggestive of Acute OM. Patient placed on long-term antibiotics with Infectious disease guidance.  Additionally treated with a 7d course of Cefepime due to PSA and Serratia tracheitis on 11/8-->11/15 and then resumed cipro/keflex.  Hospital course c/b Delirium for which Seroquel was added and home Lexapro continued. Tracheostomy changed to #9 Cuffed [place] by ENT 11/3.  Despite trach change patient remained with persistent air leak.  On 11/19, the trach was again changed due to leak and loss of volumes on vent.  Trach was changed to #8 distal cuffed [place].  Patient seen by Dermatology for back lesions.  One diagnosed as a seborrheic keratitis and the other a dermatofibroma.  Intermittent abdominal pain throughout hospital course, at times relieved with gas/BM, w/u negative, seen by GI - no recs.  Remains medically stable while pending discharge home.      12/8: Pts daughters daughter now flu +.   72 y/o F with PMHx of T2DM, HTN, HLD, anemia, hypothyroidism, RA, fibromyalgia, remote cerebral aneurysm repair, acute hypercapnic respiratory failure now with tracheostomy, PEG tube, and bedbound (trach change 8/18, PEG change 8/14), sacral pressure ulcer, BIBEMS from home for 6 day hx of bleeding from the tracheostomy and PEG tube with associated abdominal pain, recent history of auditory and visual hallucinations, and with chronic sacral decubitus ulcer. Exam notable for mild abdominal distention with LLQ and RLQ tenderness, tracheostomy in place with no obvious bleeding, PEG tube with scant amount of dried blood, at baseline neurologic status. Imaging showing no active bleeding around tracheostomy site, however was notable for mild thickening along PEG tube tract, sacral ulcer extending to the coccyx suggestive of possible osteomyelitis, and bibasilar patchy lung opacities with volume loss. Patient admitted for respiratory support, wound care of tracheostomy and PEG, and management of sacral osteomyelitis. No further Trach and peg site bleeding noted since admission.  Pelvic MRI imaging also suggestive of Acute OM. Patient placed on long-term antibiotics with Infectious disease guidance.  Additionally treated with a 7d course of Cefepime due to PSA and Serratia tracheitis on 11/8-->11/15 and then resumed cipro/keflex.  Hospital course c/b Delirium for which Seroquel was added and home Lexapro continued. Tracheostomy changed to #9 Cuffed [place] by ENT 11/3.  Despite trach change patient remained with persistent air leak.  On 11/19, the trach was again changed due to leak and loss of volumes on vent.  Trach was changed to #8 distal cuffed [place].  Patient seen by Dermatology for back lesions.  One diagnosed as a seborrheic keratitis and the other a dermatofibroma.  Intermittent abdominal pain throughout hospital course, at times relieved with gas/BM, w/u negative, seen by GI - no recs.  Remains medically stable while pending discharge home.      12/8: Pts daughters daughter now flu +.  Plan to discharge early next week. 74 y/o F with PMHx of T2DM, HTN, HLD, anemia, hypothyroidism, RA, fibromyalgia, remote cerebral aneurysm repair, acute hypercapnic respiratory failure now with tracheostomy, PEG tube, and bedbound (trach change 8/18, PEG change 8/14), sacral pressure ulcer, BIBEMS from home for 6 day hx of bleeding from the tracheostomy and PEG tube with associated abdominal pain, recent history of auditory and visual hallucinations, and with chronic sacral decubitus ulcer. Exam notable for mild abdominal distention with LLQ and RLQ tenderness, tracheostomy in place with no obvious bleeding, PEG tube with scant amount of dried blood, at baseline neurologic status. Imaging showing no active bleeding around tracheostomy site, however was notable for mild thickening along PEG tube tract, sacral ulcer extending to the coccyx suggestive of possible osteomyelitis, and bibasilar patchy lung opacities with volume loss. Patient admitted for respiratory support, wound care of tracheostomy and PEG, and management of sacral osteomyelitis. No further Trach and peg site bleeding noted since admission.  Pelvic MRI imaging also suggestive of Acute OM. Patient placed on long-term antibiotics with Infectious disease guidance.  Additionally treated with a 7d course of Cefepime due to PSA and Serratia tracheitis on 11/8-->11/15 and then resumed cipro/keflex.  Hospital course c/b Delirium for which Seroquel was added and home Lexapro continued. Tracheostomy changed to #9 Cuffed [place] by ENT 11/3.  Despite trach change patient remained with persistent air leak.  On 11/19, the trach was again changed due to leak and loss of volumes on vent.  Trach was changed to #8 distal cuffed [place].  Patient seen by Dermatology for back lesions.  One diagnosed as a seborrheic keratitis and the other a dermatofibroma.  Intermittent abdominal pain throughout hospital course, at times relieved with gas/BM, w/u negative, seen by GI - no recs.  Remains medically stable while pending discharge home.      12/8: Pts daughters daughter now flu +.  Plan to discharge early next week.

## 2023-12-08 NOTE — PROGRESS NOTE ADULT - PROBLEM SELECTOR PLAN 7
- s/p Home Levemir 14 units in morning held on admission as noted w/ hypoglycemia   - Enteral feed changed to Thryve diabetic formula; glucose numbers stabilizing. - s/p Home Levemir 14 units in morning held on admission as noted w/ hypoglycemia   - Enteral feed changed to [employer] diabetic formula; glucose numbers stabilizing.

## 2023-12-08 NOTE — PROGRESS NOTE ADULT - SUBJECTIVE AND OBJECTIVE BOX
Patient is a 73y old  Female who presents with a chief complaint of peg tube   DATE OF SERVICE: 12/8/2023      SUBJECTIVE / OVERNIGHT EVENTS: Afebrile.  Afebrile  No new symptoms    MEDICATIONS  (STANDING):  albuterol/ipratropium for Nebulization 3 milliLiter(s) Nebulizer every 6 hours  artificial  tears Solution 2 Drop(s) Both EYES four times a day  ascorbic acid 500 milliGRAM(s) Oral daily  bacitracin   Ointment 1 Application(s) Topical two times a day  bisacodyl Suppository 10 milliGRAM(s) Rectal once  calcium carbonate    500 mG (Tums) Chewable 1 Tablet(s) Chew every 12 hours  cephalexin 500 milliGRAM(s) Oral every 6 hours  chlorhexidine 0.12% Liquid 15 milliLiter(s) Oral Mucosa every 12 hours  chlorhexidine 4% Liquid 1 Application(s) Topical <User Schedule>  ciprofloxacin     Tablet 500 milliGRAM(s) Oral every 12 hours  dextrose 50% Injectable 50 milliLiter(s) IV Push once  enoxaparin Injectable 40 milliGRAM(s) SubCutaneous every 24 hours  escitalopram 10 milliGRAM(s) Oral daily  fentaNYL   Patch  25 MICROgram(s)/Hr 1 Patch Transdermal every 72 hours  gabapentin Solution 100 milliGRAM(s) Enteral Tube at bedtime  insulin glargine Injectable (LANTUS) 14 Unit(s) SubCutaneous every morning  insulin lispro (ADMELOG) corrective regimen sliding scale   SubCutaneous every 6 hours  levothyroxine 125 MICROGram(s) Oral <User Schedule>  lidocaine   4% Patch 1 Patch Transdermal daily  melatonin 3 milliGRAM(s) Oral at bedtime  multivitamin/minerals/iron Oral Solution (CENTRUM) 15 milliLiter(s) Oral daily  nystatin Powder 1 Application(s) Topical every 8 hours  oxybutynin 5 milliGRAM(s) Oral daily  pantoprazole   Suspension 40 milliGRAM(s) Enteral Tube daily  petrolatum Ophthalmic Ointment 1 Application(s) Both EYES four times a day  predniSONE   Tablet 5 milliGRAM(s) Oral daily  QUEtiapine 12.5 milliGRAM(s) Oral <User Schedule>  QUEtiapine 12.5 milliGRAM(s) Oral <User Schedule>  senna Syrup 10 milliLiter(s) Oral at bedtime  simethicone 80 milliGRAM(s) Chew four times a day  sodium chloride 3%  Inhalation 4 milliLiter(s) Inhalation every 12 hours  triamcinolone 0.1% Ointment 1 Application(s) Topical two times a day  zinc oxide 40% Paste 1 Application(s) Topical daily  zinc sulfate 220 milliGRAM(s) Oral daily    MEDICATIONS  (PRN):  acetaminophen   Oral Liquid .. 1000 milliGRAM(s) Enteral Tube every 6 hours PRN Temp greater or equal to 38C (100.4F), Mild Pain (1 - 3)  benzocaine 20% Spray 1 Spray(s) Topical four times a day PRN Cough  diphenhydrAMINE Elixir 25 milliGRAM(s) Oral every 6 hours PRN Rash and/or Itching  oxyCODONE    Solution 2.5 milliGRAM(s) Oral every 6 hours PRN Moderate Pain (4 - 6)  sodium chloride 0.65% Nasal 1 Spray(s) Both Nostrils every 6 hours PRN Nasal Congestion          I&O's Summary    17 Nov 2023 07:01  -  18 Nov 2023 07:00  --------------------------------------------------------  IN: 1300 mL / OUT: 950 mL / NET: 350 mL    18 Nov 2023 07:01  -  18 Nov 2023 17:32  --------------------------------------------------------  IN: 0 mL / OUT: 400 mL / NET: -400 mL        PHYSICAL EXAM:  Vital Signs Last 24 Hrs  T(C): 36.6 (08 Dec 2023 12:14), Max: 36.8 (07 Dec 2023 17:20)  T(F): 97.8 (08 Dec 2023 12:14), Max: 98.3 (07 Dec 2023 17:20)  HR: 76 (08 Dec 2023 12:14) (63 - 100)  BP: 116/54 (08 Dec 2023 12:14) (116/54 - 144/69)  BP(mean): --  RR: 20 (08 Dec 2023 12:14) (20 - 20)  SpO2: 100% (08 Dec 2023 12:14) (98% - 100%)    Parameters below as of 08 Dec 2023 11:12  Patient On (Oxygen Delivery Method): ventilator      GENERAL: NAD, well-developed  HEAD:  Atraumatic, Normocephalic  EYES: EOMI, PERRLA, conjunctiva and sclera clear  NECK: Supple, No JVD. On [facility] vent with a tracheostomy  CHEST/LUNG: Clear to auscultation bilaterally; No wheeze  HEART: Regular rate and rhythm; No murmurs, rubs, or gallops  ABDOMEN: Soft, Nontender, Nondistended; Bowel sounds present  EXTREMITIES:  2+ Peripheral Pulses, No clubbing, cyanosis, or edema  PSYCH: AAOx3  NEUROLOGY: non-focal. Functionally quadriplegic  SKIN: Sacral decubitus dressed    LABS:                                 9.5    8.05  )-----------( 130      ( 28 Nov 2023 06:36 )             32.2                7.5    7.48  )-----------( 134      ( 26 Nov 2023 07:27 )             25.6                                    8.6    8.89  )-----------( 126      ( 21 Nov 2023 07:05 )             29.1                8.9    9.19  )-----------( 124      ( 18 Nov 2023 09:15 )             30.4     11-18    137  |  99  |  29<H>  ----------------------------<  224<H>  4.1   |  28  |  <0.30<L>    Ca    10.0      18 Nov 2023 09:15  Mg     1.8     11-18    TPro  7.4  /  Alb  3.3  /  TBili  0.3  /  DBili  x   /  AST  19  /  ALT  18  /  AlkPhos  48  11-18          Urinalysis Basic - ( 18 Nov 2023 09:15 )    Color: x / Appearance: x / SG: x / pH: x  Gluc: 224 mg/dL / Ketone: x  / Bili: x / Urobili: x   Blood: x / Protein: x / Nitrite: x   Leuk Esterase: x / RBC: x / WBC x   Sq Epi: x / Non Sq Epi: x / Bacteria: x        RADIOLOGY & ADDITIONAL TESTS:    Imaging Personally Reviewed:    Consultant(s) Notes Reviewed:      Care Discussed with Consultants/Other Providers:   Patient is a 73y old  Female who presents with a chief complaint of peg tube   DATE OF SERVICE: 12/8/2023      SUBJECTIVE / OVERNIGHT EVENTS: Afebrile.  Afebrile  No new symptoms    MEDICATIONS  (STANDING):  albuterol/ipratropium for Nebulization 3 milliLiter(s) Nebulizer every 6 hours  artificial  tears Solution 2 Drop(s) Both EYES four times a day  ascorbic acid 500 milliGRAM(s) Oral daily  bacitracin   Ointment 1 Application(s) Topical two times a day  bisacodyl Suppository 10 milliGRAM(s) Rectal once  calcium carbonate    500 mG (Tums) Chewable 1 Tablet(s) Chew every 12 hours  cephalexin 500 milliGRAM(s) Oral every 6 hours  chlorhexidine 0.12% Liquid 15 milliLiter(s) Oral Mucosa every 12 hours  chlorhexidine 4% Liquid 1 Application(s) Topical <User Schedule>  ciprofloxacin     Tablet 500 milliGRAM(s) Oral every 12 hours  dextrose 50% Injectable 50 milliLiter(s) IV Push once  enoxaparin Injectable 40 milliGRAM(s) SubCutaneous every 24 hours  escitalopram 10 milliGRAM(s) Oral daily  fentaNYL   Patch  25 MICROgram(s)/Hr 1 Patch Transdermal every 72 hours  gabapentin Solution 100 milliGRAM(s) Enteral Tube at bedtime  insulin glargine Injectable (LANTUS) 14 Unit(s) SubCutaneous every morning  insulin lispro (ADMELOG) corrective regimen sliding scale   SubCutaneous every 6 hours  levothyroxine 125 MICROGram(s) Oral <User Schedule>  lidocaine   4% Patch 1 Patch Transdermal daily  melatonin 3 milliGRAM(s) Oral at bedtime  multivitamin/minerals/iron Oral Solution (CENTRUM) 15 milliLiter(s) Oral daily  nystatin Powder 1 Application(s) Topical every 8 hours  oxybutynin 5 milliGRAM(s) Oral daily  pantoprazole   Suspension 40 milliGRAM(s) Enteral Tube daily  petrolatum Ophthalmic Ointment 1 Application(s) Both EYES four times a day  predniSONE   Tablet 5 milliGRAM(s) Oral daily  QUEtiapine 12.5 milliGRAM(s) Oral <User Schedule>  QUEtiapine 12.5 milliGRAM(s) Oral <User Schedule>  senna Syrup 10 milliLiter(s) Oral at bedtime  simethicone 80 milliGRAM(s) Chew four times a day  sodium chloride 3%  Inhalation 4 milliLiter(s) Inhalation every 12 hours  triamcinolone 0.1% Ointment 1 Application(s) Topical two times a day  zinc oxide 40% Paste 1 Application(s) Topical daily  zinc sulfate 220 milliGRAM(s) Oral daily    MEDICATIONS  (PRN):  acetaminophen   Oral Liquid .. 1000 milliGRAM(s) Enteral Tube every 6 hours PRN Temp greater or equal to 38C (100.4F), Mild Pain (1 - 3)  benzocaine 20% Spray 1 Spray(s) Topical four times a day PRN Cough  diphenhydrAMINE Elixir 25 milliGRAM(s) Oral every 6 hours PRN Rash and/or Itching  oxyCODONE    Solution 2.5 milliGRAM(s) Oral every 6 hours PRN Moderate Pain (4 - 6)  sodium chloride 0.65% Nasal 1 Spray(s) Both Nostrils every 6 hours PRN Nasal Congestion          I&O's Summary    17 Nov 2023 07:01  -  18 Nov 2023 07:00  --------------------------------------------------------  IN: 1300 mL / OUT: 950 mL / NET: 350 mL    18 Nov 2023 07:01  -  18 Nov 2023 17:32  --------------------------------------------------------  IN: 0 mL / OUT: 400 mL / NET: -400 mL        PHYSICAL EXAM:  Vital Signs Last 24 Hrs  T(C): 36.6 (08 Dec 2023 12:14), Max: 36.8 (07 Dec 2023 17:20)  T(F): 97.8 (08 Dec 2023 12:14), Max: 98.3 (07 Dec 2023 17:20)  HR: 76 (08 Dec 2023 12:14) (63 - 100)  BP: 116/54 (08 Dec 2023 12:14) (116/54 - 144/69)  BP(mean): --  RR: 20 (08 Dec 2023 12:14) (20 - 20)  SpO2: 100% (08 Dec 2023 12:14) (98% - 100%)    Parameters below as of 08 Dec 2023 11:12  Patient On (Oxygen Delivery Method): ventilator      GENERAL: NAD, well-developed  HEAD:  Atraumatic, Normocephalic  EYES: EOMI, PERRLA, conjunctiva and sclera clear  NECK: Supple, No JVD. On [facility] vent with a tracheostomy  CHEST/LUNG: Clear to auscultation bilaterally; No wheeze  HEART: Regular rate and rhythm; No murmurs, rubs, or gallops  ABDOMEN: Soft, Nontender, Nondistended; Bowel sounds present  EXTREMITIES:  2+ Peripheral Pulses, No clubbing, cyanosis, or edema  PSYCH: AAOx3  NEUROLOGY: non-focal. Functionally quadriplegic  SKIN: Sacral decubitus dressed    LABS:                                 9.5    8.05  )-----------( 130      ( 28 Nov 2023 06:36 )             32.2                7.5    7.48  )-----------( 134      ( 26 Nov 2023 07:27 )             25.6                                    8.6    8.89  )-----------( 126      ( 21 Nov 2023 07:05 )             29.1                8.9    9.19  )-----------( 124      ( 18 Nov 2023 09:15 )             30.4     11-18    137  |  99  |  29<H>  ----------------------------<  224<H>  4.1   |  28  |  <0.30<L>    Ca    10.0      18 Nov 2023 09:15  Mg     1.8     11-18    TPro  7.4  /  Alb  3.3  /  TBili  0.3  /  DBili  x   /  AST  19  /  ALT  18  /  AlkPhos  48  11-18          Urinalysis Basic - ( 18 Nov 2023 09:15 )    Color: x / Appearance: x / SG: x / pH: x  Gluc: 224 mg/dL / Ketone: x  / Bili: x / Urobili: x   Blood: x / Protein: x / Nitrite: x   Leuk Esterase: x / RBC: x / WBC x   Sq Epi: x / Non Sq Epi: x / Bacteria: x        RADIOLOGY & ADDITIONAL TESTS:    Imaging Personally Reviewed:    Consultant(s) Notes Reviewed:      Care Discussed with Consultants/Other Providers:

## 2023-12-09 LAB
ANION GAP SERPL CALC-SCNC: 10 MMOL/L — SIGNIFICANT CHANGE UP (ref 5–17)
ANION GAP SERPL CALC-SCNC: 10 MMOL/L — SIGNIFICANT CHANGE UP (ref 5–17)
BUN SERPL-MCNC: 20 MG/DL — SIGNIFICANT CHANGE UP (ref 7–23)
BUN SERPL-MCNC: 20 MG/DL — SIGNIFICANT CHANGE UP (ref 7–23)
CALCIUM SERPL-MCNC: 9.5 MG/DL — SIGNIFICANT CHANGE UP (ref 8.4–10.5)
CALCIUM SERPL-MCNC: 9.5 MG/DL — SIGNIFICANT CHANGE UP (ref 8.4–10.5)
CHLORIDE SERPL-SCNC: 97 MMOL/L — SIGNIFICANT CHANGE UP (ref 96–108)
CHLORIDE SERPL-SCNC: 97 MMOL/L — SIGNIFICANT CHANGE UP (ref 96–108)
CO2 SERPL-SCNC: 25 MMOL/L — SIGNIFICANT CHANGE UP (ref 22–31)
CO2 SERPL-SCNC: 25 MMOL/L — SIGNIFICANT CHANGE UP (ref 22–31)
CREAT SERPL-MCNC: <0.3 MG/DL — LOW (ref 0.5–1.3)
CREAT SERPL-MCNC: <0.3 MG/DL — LOW (ref 0.5–1.3)
EGFR: 113 ML/MIN/1.73M2 — SIGNIFICANT CHANGE UP
EGFR: 113 ML/MIN/1.73M2 — SIGNIFICANT CHANGE UP
GLUCOSE BLDC GLUCOMTR-MCNC: 144 MG/DL — HIGH (ref 70–99)
GLUCOSE BLDC GLUCOMTR-MCNC: 144 MG/DL — HIGH (ref 70–99)
GLUCOSE BLDC GLUCOMTR-MCNC: 146 MG/DL — HIGH (ref 70–99)
GLUCOSE BLDC GLUCOMTR-MCNC: 146 MG/DL — HIGH (ref 70–99)
GLUCOSE BLDC GLUCOMTR-MCNC: 202 MG/DL — HIGH (ref 70–99)
GLUCOSE BLDC GLUCOMTR-MCNC: 202 MG/DL — HIGH (ref 70–99)
GLUCOSE BLDC GLUCOMTR-MCNC: 217 MG/DL — HIGH (ref 70–99)
GLUCOSE BLDC GLUCOMTR-MCNC: 217 MG/DL — HIGH (ref 70–99)
GLUCOSE BLDC GLUCOMTR-MCNC: 239 MG/DL — HIGH (ref 70–99)
GLUCOSE BLDC GLUCOMTR-MCNC: 239 MG/DL — HIGH (ref 70–99)
GLUCOSE SERPL-MCNC: 211 MG/DL — HIGH (ref 70–99)
GLUCOSE SERPL-MCNC: 211 MG/DL — HIGH (ref 70–99)
HCT VFR BLD CALC: 32 % — LOW (ref 34.5–45)
HCT VFR BLD CALC: 32 % — LOW (ref 34.5–45)
HGB BLD-MCNC: 9.5 G/DL — LOW (ref 11.5–15.5)
HGB BLD-MCNC: 9.5 G/DL — LOW (ref 11.5–15.5)
MAGNESIUM SERPL-MCNC: 1.7 MG/DL — SIGNIFICANT CHANGE UP (ref 1.6–2.6)
MAGNESIUM SERPL-MCNC: 1.7 MG/DL — SIGNIFICANT CHANGE UP (ref 1.6–2.6)
MCHC RBC-ENTMCNC: 27 PG — SIGNIFICANT CHANGE UP (ref 27–34)
MCHC RBC-ENTMCNC: 27 PG — SIGNIFICANT CHANGE UP (ref 27–34)
MCHC RBC-ENTMCNC: 29.7 GM/DL — LOW (ref 32–36)
MCHC RBC-ENTMCNC: 29.7 GM/DL — LOW (ref 32–36)
MCV RBC AUTO: 90.9 FL — SIGNIFICANT CHANGE UP (ref 80–100)
MCV RBC AUTO: 90.9 FL — SIGNIFICANT CHANGE UP (ref 80–100)
NRBC # BLD: 0 /100 WBCS — SIGNIFICANT CHANGE UP (ref 0–0)
NRBC # BLD: 0 /100 WBCS — SIGNIFICANT CHANGE UP (ref 0–0)
PHOSPHATE SERPL-MCNC: 4 MG/DL — SIGNIFICANT CHANGE UP (ref 2.5–4.5)
PHOSPHATE SERPL-MCNC: 4 MG/DL — SIGNIFICANT CHANGE UP (ref 2.5–4.5)
PLATELET # BLD AUTO: 116 K/UL — LOW (ref 150–400)
PLATELET # BLD AUTO: 116 K/UL — LOW (ref 150–400)
POTASSIUM SERPL-MCNC: 4.4 MMOL/L — SIGNIFICANT CHANGE UP (ref 3.5–5.3)
POTASSIUM SERPL-MCNC: 4.4 MMOL/L — SIGNIFICANT CHANGE UP (ref 3.5–5.3)
POTASSIUM SERPL-SCNC: 4.4 MMOL/L — SIGNIFICANT CHANGE UP (ref 3.5–5.3)
POTASSIUM SERPL-SCNC: 4.4 MMOL/L — SIGNIFICANT CHANGE UP (ref 3.5–5.3)
RBC # BLD: 3.52 M/UL — LOW (ref 3.8–5.2)
RBC # BLD: 3.52 M/UL — LOW (ref 3.8–5.2)
RBC # FLD: 15.9 % — HIGH (ref 10.3–14.5)
RBC # FLD: 15.9 % — HIGH (ref 10.3–14.5)
SODIUM SERPL-SCNC: 132 MMOL/L — LOW (ref 135–145)
SODIUM SERPL-SCNC: 132 MMOL/L — LOW (ref 135–145)
WBC # BLD: 7.89 K/UL — SIGNIFICANT CHANGE UP (ref 3.8–10.5)
WBC # BLD: 7.89 K/UL — SIGNIFICANT CHANGE UP (ref 3.8–10.5)
WBC # FLD AUTO: 7.89 K/UL — SIGNIFICANT CHANGE UP (ref 3.8–10.5)
WBC # FLD AUTO: 7.89 K/UL — SIGNIFICANT CHANGE UP (ref 3.8–10.5)

## 2023-12-09 PROCEDURE — 99233 SBSQ HOSP IP/OBS HIGH 50: CPT | Mod: GC

## 2023-12-09 RX ORDER — INSULIN GLARGINE 100 [IU]/ML
2 INJECTION, SOLUTION SUBCUTANEOUS ONCE
Refills: 0 | Status: COMPLETED | OUTPATIENT
Start: 2023-12-09 | End: 2023-12-09

## 2023-12-09 RX ORDER — MAGNESIUM SULFATE 500 MG/ML
2 VIAL (ML) INJECTION EVERY 4 HOURS
Refills: 0 | Status: COMPLETED | OUTPATIENT
Start: 2023-12-09 | End: 2023-12-09

## 2023-12-09 RX ORDER — INSULIN HUMAN 100 [IU]/ML
22 INJECTION, SOLUTION SUBCUTANEOUS
Refills: 0 | Status: DISCONTINUED | OUTPATIENT
Start: 2023-12-10 | End: 2023-12-10

## 2023-12-09 RX ORDER — INSULIN GLARGINE 100 [IU]/ML
18 INJECTION, SOLUTION SUBCUTANEOUS EVERY MORNING
Refills: 0 | Status: DISCONTINUED | OUTPATIENT
Start: 2023-12-10 | End: 2023-12-27

## 2023-12-09 RX ADMIN — INSULIN HUMAN 11 UNIT(S): 100 INJECTION, SOLUTION SUBCUTANEOUS at 11:55

## 2023-12-09 RX ADMIN — Medication 2 DROP(S): at 11:47

## 2023-12-09 RX ADMIN — AMLODIPINE BESYLATE 10 MILLIGRAM(S): 2.5 TABLET ORAL at 05:12

## 2023-12-09 RX ADMIN — SIMETHICONE 80 MILLIGRAM(S): 80 TABLET, CHEWABLE ORAL at 05:13

## 2023-12-09 RX ADMIN — NYSTATIN CREAM 1 APPLICATION(S): 100000 CREAM TOPICAL at 05:14

## 2023-12-09 RX ADMIN — Medication 1 APPLICATION(S): at 05:13

## 2023-12-09 RX ADMIN — ZINC OXIDE 1 APPLICATION(S): 200 OINTMENT TOPICAL at 11:57

## 2023-12-09 RX ADMIN — Medication 15 MILLILITER(S): at 11:45

## 2023-12-09 RX ADMIN — LIDOCAINE 1 PATCH: 4 CREAM TOPICAL at 23:55

## 2023-12-09 RX ADMIN — CHLORHEXIDINE GLUCONATE 15 MILLILITER(S): 213 SOLUTION TOPICAL at 17:14

## 2023-12-09 RX ADMIN — Medication 25 GRAM(S): at 13:06

## 2023-12-09 RX ADMIN — Medication 2: at 06:07

## 2023-12-09 RX ADMIN — Medication 1 APPLICATION(S): at 17:13

## 2023-12-09 RX ADMIN — Medication 5 MILLIGRAM(S): at 22:50

## 2023-12-09 RX ADMIN — Medication 500 MILLIGRAM(S): at 17:13

## 2023-12-09 RX ADMIN — SODIUM CHLORIDE 4 MILLILITER(S): 9 INJECTION INTRAMUSCULAR; INTRAVENOUS; SUBCUTANEOUS at 06:45

## 2023-12-09 RX ADMIN — Medication 2: at 11:55

## 2023-12-09 RX ADMIN — Medication 3 MILLILITER(S): at 17:56

## 2023-12-09 RX ADMIN — Medication 3 MILLILITER(S): at 06:45

## 2023-12-09 RX ADMIN — Medication 2 DROP(S): at 23:16

## 2023-12-09 RX ADMIN — Medication 3 MILLILITER(S): at 23:08

## 2023-12-09 RX ADMIN — Medication 1 TABLET(S): at 05:13

## 2023-12-09 RX ADMIN — Medication 1000 MILLIGRAM(S): at 03:30

## 2023-12-09 RX ADMIN — NYSTATIN CREAM 1 APPLICATION(S): 100000 CREAM TOPICAL at 17:12

## 2023-12-09 RX ADMIN — SIMETHICONE 80 MILLIGRAM(S): 80 TABLET, CHEWABLE ORAL at 23:15

## 2023-12-09 RX ADMIN — Medication 500 MILLIGRAM(S): at 00:42

## 2023-12-09 RX ADMIN — INSULIN HUMAN 11 UNIT(S): 100 INJECTION, SOLUTION SUBCUTANEOUS at 17:50

## 2023-12-09 RX ADMIN — NYSTATIN CREAM 1 APPLICATION(S): 100000 CREAM TOPICAL at 22:50

## 2023-12-09 RX ADMIN — INSULIN GLARGINE 2 UNIT(S): 100 INJECTION, SOLUTION SUBCUTANEOUS at 12:21

## 2023-12-09 RX ADMIN — QUETIAPINE FUMARATE 12.5 MILLIGRAM(S): 200 TABLET, FILM COATED ORAL at 17:13

## 2023-12-09 RX ADMIN — Medication 2 DROP(S): at 05:13

## 2023-12-09 RX ADMIN — Medication 5 MILLIGRAM(S): at 05:12

## 2023-12-09 RX ADMIN — PANTOPRAZOLE SODIUM 40 MILLIGRAM(S): 20 TABLET, DELAYED RELEASE ORAL at 09:31

## 2023-12-09 RX ADMIN — LIDOCAINE 1 PATCH: 4 CREAM TOPICAL at 11:48

## 2023-12-09 RX ADMIN — GABAPENTIN 100 MILLIGRAM(S): 400 CAPSULE ORAL at 22:49

## 2023-12-09 RX ADMIN — PHENYLEPHRINE-SHARK LIVER OIL-MINERAL OIL-PETROLATUM RECTAL OINTMENT 1 APPLICATION(S): at 11:57

## 2023-12-09 RX ADMIN — Medication 3 MILLILITER(S): at 12:03

## 2023-12-09 RX ADMIN — Medication 1000 MILLIGRAM(S): at 23:00

## 2023-12-09 RX ADMIN — Medication 25 GRAM(S): at 17:12

## 2023-12-09 RX ADMIN — SIMETHICONE 80 MILLIGRAM(S): 80 TABLET, CHEWABLE ORAL at 11:46

## 2023-12-09 RX ADMIN — ESCITALOPRAM OXALATE 10 MILLIGRAM(S): 10 TABLET, FILM COATED ORAL at 08:41

## 2023-12-09 RX ADMIN — Medication 1000 MILLIGRAM(S): at 04:48

## 2023-12-09 RX ADMIN — CARBAMIDE PEROXIDE 1 DROP(S): 81.86 SOLUTION/ DROPS AURICULAR (OTIC) at 17:17

## 2023-12-09 RX ADMIN — Medication 125 MICROGRAM(S): at 04:31

## 2023-12-09 RX ADMIN — Medication 1 APPLICATION(S): at 11:47

## 2023-12-09 RX ADMIN — SIMETHICONE 80 MILLIGRAM(S): 80 TABLET, CHEWABLE ORAL at 17:12

## 2023-12-09 RX ADMIN — Medication 500 MILLIGRAM(S): at 11:48

## 2023-12-09 RX ADMIN — SODIUM CHLORIDE 4 MILLILITER(S): 9 INJECTION INTRAMUSCULAR; INTRAVENOUS; SUBCUTANEOUS at 17:56

## 2023-12-09 RX ADMIN — Medication 500 MILLIGRAM(S): at 23:15

## 2023-12-09 RX ADMIN — INSULIN HUMAN 22 UNIT(S): 100 INJECTION, SOLUTION SUBCUTANEOUS at 06:09

## 2023-12-09 RX ADMIN — Medication 500 MILLIGRAM(S): at 05:11

## 2023-12-09 RX ADMIN — Medication 1000 MILLIGRAM(S): at 06:19

## 2023-12-09 RX ADMIN — SIMETHICONE 80 MILLIGRAM(S): 80 TABLET, CHEWABLE ORAL at 00:42

## 2023-12-09 RX ADMIN — Medication 2 DROP(S): at 17:14

## 2023-12-09 RX ADMIN — CHLORHEXIDINE GLUCONATE 15 MILLILITER(S): 213 SOLUTION TOPICAL at 05:14

## 2023-12-09 RX ADMIN — Medication 1 APPLICATION(S): at 00:42

## 2023-12-09 RX ADMIN — INSULIN GLARGINE 16 UNIT(S): 100 INJECTION, SOLUTION SUBCUTANEOUS at 08:41

## 2023-12-09 RX ADMIN — Medication 500 MILLIGRAM(S): at 11:45

## 2023-12-09 RX ADMIN — LIDOCAINE 1 PATCH: 4 CREAM TOPICAL at 19:30

## 2023-12-09 RX ADMIN — CHLORHEXIDINE GLUCONATE 1 APPLICATION(S): 213 SOLUTION TOPICAL at 05:17

## 2023-12-09 RX ADMIN — Medication 1 MILLIGRAM(S): at 22:50

## 2023-12-09 RX ADMIN — Medication 1 TABLET(S): at 17:16

## 2023-12-09 RX ADMIN — Medication 200 MILLIGRAM(S): at 17:12

## 2023-12-09 RX ADMIN — Medication 1 APPLICATION(S): at 23:16

## 2023-12-09 RX ADMIN — Medication 200 MILLIGRAM(S): at 05:03

## 2023-12-09 RX ADMIN — CARBAMIDE PEROXIDE 1 DROP(S): 81.86 SOLUTION/ DROPS AURICULAR (OTIC) at 05:15

## 2023-12-09 RX ADMIN — Medication 2 DROP(S): at 00:42

## 2023-12-09 NOTE — PROGRESS NOTE ADULT - SUBJECTIVE AND OBJECTIVE BOX
Patient is a 73y old  Female who presents with a chief complaint of bleeding (04 Dec 2023 16:57)      Interval Events: Seen and examined at bedside. All vital signs have been reviewed and are stable. No acute events overnight.     REVIEW OF SYSTEMS:  [ ] Positive  [ ] All other systems negative  [ ] Unable to assess ROS because ________    Vital Signs Last 24 Hrs  T(C): 37.2 (12-09-23 @ 00:31), Max: 37.2 (12-09-23 @ 00:31)  T(F): 98.9 (12-09-23 @ 00:31), Max: 98.9 (12-09-23 @ 00:31)  HR: 82 (12-09-23 @ 06:45) (76 - 104)  BP: 149/73 (12-09-23 @ 04:43) (102/59 - 149/73)  RR: 20 (12-09-23 @ 04:43) (20 - 20)  SpO2: 100% (12-09-23 @ 06:45) (97% - 100%)    PHYSICAL EXAM:  HEENT:   [X]Tracheostomy: #8 distal XLT Shiley  [X]Pupils equal  [ ]No oral lesions  [ ]Abnormal    SKIN  [ ]No Rash  [ ] Abnormal  [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     CARDIAC  [X]Regular  [ ]Abnormal    PULMONARY  [ ]Bilateral Clear Breath Sounds  [ ]Normal Excursion  [X]Abnormal - BL Coarse BS    GI  [X]PEG      [X] +BS		              [X]Soft, nondistended, nontender	  [ ]Abnormal    MUSCULOSKELETAL                                   [X]Bedbound                 [ ]Abnormal    [ ]Ambulatory/OOB to chair                           EXTREMITIES                                         [X]Normal  [ ]Edema                           NEUROLOGIC  [ ] Normal, non focal  [X] Focal findings: opens eyes spontaneously, follows commands on all 4 extremities, able to mouth words    PSYCHIATRIC  [X]Alert and appropriate  [ ] Sedated	 [ ]Agitated    :  Mcbride: [ ] Yes, if yes: Date of Placement:                   [X] No    LINES: Central Lines [ ] Yes, if yes: Date of Placement                                     [X] No    HOSPITAL MEDICATIONS:  MEDICATIONS  (STANDING):  albuterol/ipratropium for Nebulization 3 milliLiter(s) Nebulizer every 6 hours  amLODIPine   Tablet 10 milliGRAM(s) Oral daily  artificial  tears Solution 2 Drop(s) Both EYES four times a day  ascorbic acid 500 milliGRAM(s) Oral daily  calcium carbonate    500 mG (Tums) Chewable 1 Tablet(s) Chew every 12 hours  carbamide peroxide Otic Solution 1 Drop(s) Both Ears every 12 hours  cephalexin 500 milliGRAM(s) Oral every 6 hours  chlorhexidine 0.12% Liquid 15 milliLiter(s) Oral Mucosa every 12 hours  chlorhexidine 4% Liquid 1 Application(s) Topical <User Schedule>  ciprofloxacin   IVPB 400 milliGRAM(s) IV Intermittent every 12 hours  dextrose 50% Injectable 25 Gram(s) IV Push once  dextrose 50% Injectable 12.5 Gram(s) IV Push once  dextrose 50% Injectable 50 milliLiter(s) IV Push once  escitalopram 10 milliGRAM(s) Oral <User Schedule>  fentaNYL   Patch  25 MICROgram(s)/Hr 1 Patch Transdermal every 72 hours  gabapentin Solution 100 milliGRAM(s) Enteral Tube at bedtime  glucagon  Injectable 1 milliGRAM(s) IntraMuscular once  hemorrhoidal Ointment 1 Application(s) Rectal daily  hemorrhoidal Ointment      insulin glargine Injectable (LANTUS) 16 Unit(s) SubCutaneous every morning  insulin lispro (ADMELOG) corrective regimen sliding scale   SubCutaneous every 6 hours  insulin regular  human recombinant 11 Unit(s) SubCutaneous <User Schedule>  insulin regular  human recombinant 22 Unit(s) SubCutaneous <User Schedule>  levothyroxine 125 MICROGram(s) Oral <User Schedule>  lidocaine   4% Patch 1 Patch Transdermal daily  melatonin 1 milliGRAM(s) Oral at bedtime  melatonin 5 milliGRAM(s) Oral at bedtime  multivitamin/minerals/iron Oral Solution (CENTRUM) 15 milliLiter(s) Oral daily  nystatin Powder 1 Application(s) Topical every 8 hours  pantoprazole   Suspension 40 milliGRAM(s) Enteral Tube <User Schedule>  petrolatum Ophthalmic Ointment 1 Application(s) Both EYES four times a day  predniSONE   Tablet 5 milliGRAM(s) Oral daily  QUEtiapine 12.5 milliGRAM(s) Oral <User Schedule>  simethicone 80 milliGRAM(s) Chew four times a day  sodium chloride 3%  Inhalation 4 milliLiter(s) Inhalation every 12 hours  zinc oxide 40% Paste 1 Application(s) Topical daily    MEDICATIONS  (PRN):  acetaminophen   Oral Liquid .. 1000 milliGRAM(s) Enteral Tube every 6 hours PRN Temp greater or equal to 38C (100.4F), Mild Pain (1 - 3)  benzocaine 20% Spray 1 Spray(s) Topical four times a day PRN Cough  diclofenac sodium 1% Gel 2 Gram(s) Topical four times a day PRN pain  diphenhydrAMINE Elixir 25 milliGRAM(s) Oral every 6 hours PRN Rash and/or Itching  guaifenesin/dextromethorphan Oral Liquid 10 milliLiter(s) Oral every 6 hours PRN Cough  sodium chloride 0.65% Nasal 1 Spray(s) Both Nostrils every 6 hours PRN Nasal Congestion      LABS:                  CAPILLARY BLOOD GLUCOSE    MICROBIOLOGY:     RADIOLOGY:  [ ] Reviewed and interpreted by me    Mode: AC/ CMV (Assist Control/ Continuous Mandatory Ventilation)  RR (machine): 12  TV (machine): 300  FiO2: 30  PEEP: 5  ITime: 1  MAP: 9  PIP: 27

## 2023-12-09 NOTE — PROGRESS NOTE ADULT - ATTENDING COMMENTS
72 y/o F with PMHx of T2DM, HTN, HLD, anemia, hypothyroidism, RA, fibromyalgia, remote cerebral aneurysm repair, acute hypercapnic respiratory failure now with tracheostomy, PEG tube, and bedbound (trach change 8/18, PEG change 8/14), sacral pressure ulcer, BIBEMS from home for 6 day hx of bleeding from the tracheostomy and PEG tube with associated abdominal pain, recent history of auditory and visual hallucinations, and with chronic sacral decubitus ulcer. Improved on antibiotics for osteomyelitis and staph/pseud  in sputum.  She is hd stable, afebrile, on vent (chronic) There are no acute issues today.  AGree with plan as outlined above.

## 2023-12-09 NOTE — PROGRESS NOTE ADULT - ASSESSMENT
72 y/o F with PMHx of T2DM, HTN, HLD, anemia, hypothyroidism, RA, fibromyalgia, remote cerebral aneurysm repair, acute hypercapnic respiratory failure now with tracheostomy, PEG tube, and bedbound (trach change 8/18, PEG change 8/14), sacral pressure ulcer, BIBEMS from home for 6 day hx of bleeding from the tracheostomy and PEG tube with associated abdominal pain, recent history of auditory and visual hallucinations, and with chronic sacral decubitus ulcer. Exam notable for mild abdominal distention with LLQ and RLQ tenderness, tracheostomy in place with no obvious bleeding, PEG tube with scant amount of dried blood, at baseline neurologic status. Imaging showing no active bleeding around tracheostomy site, however was notable for mild thickening along PEG tube tract, sacral ulcer extending to the coccyx suggestive of possible osteomyelitis, and bibasilar patchy lung opacities with volume loss. Patient admitted for respiratory support, wound care of tracheostomy and PEG, and management of sacral osteomyelitis. No further Trach and peg site bleeding noted since admission.  Pelvic MRI imaging also suggestive of Acute OM. Patient placed on long-term antibiotics with Infectious disease guidance.  Additionally treated with a 7d course of Cefepime due to PSA and Serratia tracheitis on 11/8-->11/15 and then resumed cipro/keflex.  Hospital course c/b Delirium for which Seroquel was added and home Lexapro continued. Tracheostomy changed to #9 Cuffed Portex by ENT 11/3.  Despite trach change patient remained with persistent air leak.  On 11/19, the trach was again changed due to leak and loss of volumes on vent.  Trach was changed to #8 distal cuffed Shiley.  Patient seen by Dermatology for back lesions.  One diagnosed as a seborrheic keratitis and the other a dermatofibroma.  Intermittent abdominal pain throughout hospital course, at times relieved with gas/BM, w/u negative, seen by GI - no recs.  Remains medically stable while pending discharge home.      12/8: Pts daughters daughter now flu +.  Plan to discharge early next week. 74 y/o F with PMHx of T2DM, HTN, HLD, anemia, hypothyroidism, RA, fibromyalgia, remote cerebral aneurysm repair, acute hypercapnic respiratory failure now with tracheostomy, PEG tube, and bedbound (trach change 8/18, PEG change 8/14), sacral pressure ulcer, BIBEMS from home for 6 day hx of bleeding from the tracheostomy and PEG tube with associated abdominal pain, recent history of auditory and visual hallucinations, and with chronic sacral decubitus ulcer. Exam notable for mild abdominal distention with LLQ and RLQ tenderness, tracheostomy in place with no obvious bleeding, PEG tube with scant amount of dried blood, at baseline neurologic status. Imaging showing no active bleeding around tracheostomy site, however was notable for mild thickening along PEG tube tract, sacral ulcer extending to the coccyx suggestive of possible osteomyelitis, and bibasilar patchy lung opacities with volume loss. Patient admitted for respiratory support, wound care of tracheostomy and PEG, and management of sacral osteomyelitis. No further Trach and peg site bleeding noted since admission.  Pelvic MRI imaging also suggestive of Acute OM. Patient placed on long-term antibiotics with Infectious disease guidance.  Additionally treated with a 7d course of Cefepime due to PSA and Serratia tracheitis on 11/8-->11/15 and then resumed cipro/keflex.  Hospital course c/b Delirium for which Seroquel was added and home Lexapro continued. Tracheostomy changed to #9 Cuffed Portex by ENT 11/3.  Despite trach change patient remained with persistent air leak.  On 11/19, the trach was again changed due to leak and loss of volumes on vent.  Trach was changed to #8 distal cuffed Shiley.  Patient seen by Dermatology for back lesions.  One diagnosed as a seborrheic keratitis and the other a dermatofibroma.  Intermittent abdominal pain throughout hospital course, at times relieved with gas/BM, w/u negative, seen by GI - no recs.  Remains medically stable while pending discharge home.      12/8: Pts daughters daughter now flu +.  Plan to discharge early next week.

## 2023-12-09 NOTE — CHART NOTE - NSCHARTNOTEFT_GEN_A_CORE
74 y/o F w/h/o T2DM with unknown control due to anemia of chronic disease skewing A1C levels. On Levemir and Humalog insulin PTA. Unknown DM complications. Also h/o HTN, HLD, anemia, hypothyroidism, RA, fibromyalgia, remote cerebral aneurysm repair, acute hypercapnic respiratory failure now with tracheostomy, PEG tube, and bedbound (trach change 8/18, PEG change 8/14), sacral pressure ulcer, BIBEMS from home for 6 day hx of bleeding from the tracheostomy and PEG tube with associated abdominal pain> now resolved. Endocrinology consulted for hyperglycemia. Tolerating TFs at goal 60 ml/hr from 6am to 2am with. BG goal 100 to low 200s due to age and comorbidities and AMS. Fasting  at 6 am. No hypoglycemia episode last night. Will adjust Lantus dose     POCT Blood Glucose:  144 mg/dL (12-09-23 @ 17:42)  217 mg/dL (12-09-23 @ 11:51)  202 mg/dL (12-09-23 @ 08:34)  239 mg/dL (12-09-23 @ 05:56)  146 mg/dL (12-09-23 @ 00:22)      eMAR:  insulin glargine Injectable (LANTUS)   2 Unit(s) SubCutaneous (12-09-23 @ 12:21)    insulin glargine Injectable (LANTUS)   16 Unit(s) SubCutaneous (12-09-23 @ 08:41)    insulin lispro (ADMELOG) corrective regimen sliding scale   2 Unit(s) SubCutaneous (12-09-23 @ 11:55)   2 Unit(s) SubCutaneous (12-09-23 @ 06:07)    insulin regular  human recombinant   22 Unit(s) SubCutaneous (12-09-23 @ 06:09)    insulin regular  human recombinant   11 Unit(s) SubCutaneous (12-09-23 @ 17:50)   11 Unit(s) SubCutaneous (12-09-23 @ 11:55)    levothyroxine   125 MICROGram(s) Oral (12-09-23 @ 04:31)    predniSONE   Tablet   5 milliGRAM(s) Oral (12-09-23 @ 05:12)        Diet, NPO with Tube Feed:   Tube Feeding Modality: Gastrostomy  [facility] glucose support 1.2  Total Volume for 24 Hours (mL): 1200  Continuous  Starting Tube Feed Rate {mL per Hour}: 60  Increase Tube Feed Rate by (mL): 0  Until Goal Tube Feed Rate (mL per Hour): 60  Tube Feed Duration (in Hours): 20  Tube Feed Start Time: 06:00  Tube Feed Stop Time: 02:00  Free Water Flush  Pump   Rate (mL per Hour): 10   Frequency: Every 2 Hours  Alfred(7 Gm Arginine/7 Gm Glut/1.2 Gm HMB     Qty per Day:  2 (11-29-23 @ 14:12) [Active]      # Type 2 diabetes mellitus with hyperglycemia, with long-term current use of insulin.   -Test BG q6h while on TFs/NPO  - Increase Lantus to 18 units daily in am ( s/p Lantus 16 units in am and s/p additional lantus 2 units)   -C/w Regular insulin 22 at 6am, 11 units at 12 noon and 6pm. PLEASE DO NOT HOLD. CAN GIVE LOWER DOSE IF CONCERN FOR HYPOGLYCEMIA.   -C/W low dose admelog correction scale every 6 hours to low dose since pt hardly needs any correction scale at this time.   -Will follow and adjust insulin as needed  - Please keep hypoglycemia protocol in place     Nissa Veliz NP-C   Contact via [employer] Teams during business hours  To reach covering provider access AMION via sunrise tools  For Urgent matters/after-hours/weekends/holidays please page endocrine fellow on call   For nonurgent matters please email [email or web address]    Please note that this patient may be followed by different provider tomorrow.  Notify endocrine 24 hours prior to discharge for final recommendations 72 y/o F w/h/o T2DM with unknown control due to anemia of chronic disease skewing A1C levels. On Levemir and Humalog insulin PTA. Unknown DM complications. Also h/o HTN, HLD, anemia, hypothyroidism, RA, fibromyalgia, remote cerebral aneurysm repair, acute hypercapnic respiratory failure now with tracheostomy, PEG tube, and bedbound (trach change 8/18, PEG change 8/14), sacral pressure ulcer, BIBEMS from home for 6 day hx of bleeding from the tracheostomy and PEG tube with associated abdominal pain> now resolved. Endocrinology consulted for hyperglycemia. Tolerating TFs at goal 60 ml/hr from 6am to 2am with. BG goal 100 to low 200s due to age and comorbidities and AMS. Fasting  at 6 am. No hypoglycemia episode last night. Will adjust Lantus dose     POCT Blood Glucose:  144 mg/dL (12-09-23 @ 17:42)  217 mg/dL (12-09-23 @ 11:51)  202 mg/dL (12-09-23 @ 08:34)  239 mg/dL (12-09-23 @ 05:56)  146 mg/dL (12-09-23 @ 00:22)      eMAR:  insulin glargine Injectable (LANTUS)   2 Unit(s) SubCutaneous (12-09-23 @ 12:21)    insulin glargine Injectable (LANTUS)   16 Unit(s) SubCutaneous (12-09-23 @ 08:41)    insulin lispro (ADMELOG) corrective regimen sliding scale   2 Unit(s) SubCutaneous (12-09-23 @ 11:55)   2 Unit(s) SubCutaneous (12-09-23 @ 06:07)    insulin regular  human recombinant   22 Unit(s) SubCutaneous (12-09-23 @ 06:09)    insulin regular  human recombinant   11 Unit(s) SubCutaneous (12-09-23 @ 17:50)   11 Unit(s) SubCutaneous (12-09-23 @ 11:55)    levothyroxine   125 MICROGram(s) Oral (12-09-23 @ 04:31)    predniSONE   Tablet   5 milliGRAM(s) Oral (12-09-23 @ 05:12)        Diet, NPO with Tube Feed:   Tube Feeding Modality: Gastrostomy  [facility] glucose support 1.2  Total Volume for 24 Hours (mL): 1200  Continuous  Starting Tube Feed Rate {mL per Hour}: 60  Increase Tube Feed Rate by (mL): 0  Until Goal Tube Feed Rate (mL per Hour): 60  Tube Feed Duration (in Hours): 20  Tube Feed Start Time: 06:00  Tube Feed Stop Time: 02:00  Free Water Flush  Pump   Rate (mL per Hour): 10   Frequency: Every 2 Hours  Alfred(7 Gm Arginine/7 Gm Glut/1.2 Gm HMB     Qty per Day:  2 (11-29-23 @ 14:12) [Active]      # Type 2 diabetes mellitus with hyperglycemia, with long-term current use of insulin.   -Test BG q6h while on TFs/NPO  - Increase Lantus to 18 units daily in am ( s/p Lantus 16 units in am and s/p additional lantus 2 units)   -C/w Regular insulin 22 at 6am, 11 units at 12 noon and 6pm. PLEASE DO NOT HOLD. CAN GIVE LOWER DOSE IF CONCERN FOR HYPOGLYCEMIA.   -C/W low dose admelog correction scale every 6 hours to low dose since pt hardly needs any correction scale at this time.   -Will follow and adjust insulin as needed  - Please keep hypoglycemia protocol in place     Nissa Veliz NP-C   Contact via [employer] Teams during business hours  To reach covering provider access AMION via sunrise tools  For Urgent matters/after-hours/weekends/holidays please page endocrine fellow on call   For nonurgent matters please email [email or web address]    Please note that this patient may be followed by different provider tomorrow.  Notify endocrine 24 hours prior to discharge for final recommendations

## 2023-12-09 NOTE — PROGRESS NOTE ADULT - PROBLEM SELECTOR PLAN 7
- s/p Home Levemir 14 units in morning held on admission as noted w/ hypoglycemia   - Enteral feed changed to [employer] diabetic formula; glucose numbers stabilizing. - s/p Home Levemir 14 units in morning held on admission as noted w/ hypoglycemia   - Enteral feed changed to [employer] diabetic formula; glucose numbers stabilizing.

## 2023-12-09 NOTE — PROGRESS NOTE ADULT - PROBLEM SELECTOR PLAN 9
DVT PPx: Lovenox discontinued given daughter's concern for trach and PEG stoma bleeding/oozing.  Also with mild thrombocytopenia.  Will continue SCDs.     GOC: Full code  __ long discussion of RCU team with daughter, pt remains FULL CODE, daughter wishes MOLST form reevaluation once pt is less delirious carried

## 2023-12-10 LAB
GLUCOSE BLDC GLUCOMTR-MCNC: 103 MG/DL — HIGH (ref 70–99)
GLUCOSE BLDC GLUCOMTR-MCNC: 103 MG/DL — HIGH (ref 70–99)
GLUCOSE BLDC GLUCOMTR-MCNC: 128 MG/DL — HIGH (ref 70–99)
GLUCOSE BLDC GLUCOMTR-MCNC: 128 MG/DL — HIGH (ref 70–99)
GLUCOSE BLDC GLUCOMTR-MCNC: 133 MG/DL — HIGH (ref 70–99)
GLUCOSE BLDC GLUCOMTR-MCNC: 133 MG/DL — HIGH (ref 70–99)
GLUCOSE BLDC GLUCOMTR-MCNC: 143 MG/DL — HIGH (ref 70–99)
GLUCOSE BLDC GLUCOMTR-MCNC: 143 MG/DL — HIGH (ref 70–99)
GLUCOSE BLDC GLUCOMTR-MCNC: 165 MG/DL — HIGH (ref 70–99)
GLUCOSE BLDC GLUCOMTR-MCNC: 165 MG/DL — HIGH (ref 70–99)
GLUCOSE BLDC GLUCOMTR-MCNC: 182 MG/DL — HIGH (ref 70–99)
GLUCOSE BLDC GLUCOMTR-MCNC: 182 MG/DL — HIGH (ref 70–99)
GLUCOSE BLDC GLUCOMTR-MCNC: 203 MG/DL — HIGH (ref 70–99)
GLUCOSE BLDC GLUCOMTR-MCNC: 203 MG/DL — HIGH (ref 70–99)

## 2023-12-10 PROCEDURE — 99233 SBSQ HOSP IP/OBS HIGH 50: CPT

## 2023-12-10 RX ORDER — INSULIN HUMAN 100 [IU]/ML
24 INJECTION, SOLUTION SUBCUTANEOUS
Refills: 0 | Status: DISCONTINUED | OUTPATIENT
Start: 2023-12-11 | End: 2023-12-21

## 2023-12-10 RX ADMIN — Medication 1 TABLET(S): at 18:23

## 2023-12-10 RX ADMIN — Medication 1 APPLICATION(S): at 23:16

## 2023-12-10 RX ADMIN — ESCITALOPRAM OXALATE 10 MILLIGRAM(S): 10 TABLET, FILM COATED ORAL at 09:25

## 2023-12-10 RX ADMIN — SIMETHICONE 80 MILLIGRAM(S): 80 TABLET, CHEWABLE ORAL at 18:23

## 2023-12-10 RX ADMIN — Medication 1000 MILLIGRAM(S): at 21:19

## 2023-12-10 RX ADMIN — Medication 3 MILLILITER(S): at 23:25

## 2023-12-10 RX ADMIN — Medication 2: at 13:14

## 2023-12-10 RX ADMIN — Medication 15 MILLILITER(S): at 13:09

## 2023-12-10 RX ADMIN — Medication 500 MILLIGRAM(S): at 18:23

## 2023-12-10 RX ADMIN — Medication 200 MILLIGRAM(S): at 05:04

## 2023-12-10 RX ADMIN — CHLORHEXIDINE GLUCONATE 1 APPLICATION(S): 213 SOLUTION TOPICAL at 06:02

## 2023-12-10 RX ADMIN — Medication 3 MILLILITER(S): at 18:03

## 2023-12-10 RX ADMIN — Medication 2 DROP(S): at 13:10

## 2023-12-10 RX ADMIN — Medication 1 APPLICATION(S): at 06:06

## 2023-12-10 RX ADMIN — Medication 500 MILLIGRAM(S): at 06:05

## 2023-12-10 RX ADMIN — Medication 500 MILLIGRAM(S): at 13:10

## 2023-12-10 RX ADMIN — Medication 500 MILLIGRAM(S): at 23:15

## 2023-12-10 RX ADMIN — Medication 5 MILLIGRAM(S): at 21:19

## 2023-12-10 RX ADMIN — AMLODIPINE BESYLATE 10 MILLIGRAM(S): 2.5 TABLET ORAL at 06:08

## 2023-12-10 RX ADMIN — Medication 1 MILLIGRAM(S): at 21:20

## 2023-12-10 RX ADMIN — NYSTATIN CREAM 1 APPLICATION(S): 100000 CREAM TOPICAL at 14:00

## 2023-12-10 RX ADMIN — Medication 1 TABLET(S): at 06:07

## 2023-12-10 RX ADMIN — SODIUM CHLORIDE 4 MILLILITER(S): 9 INJECTION INTRAMUSCULAR; INTRAVENOUS; SUBCUTANEOUS at 05:52

## 2023-12-10 RX ADMIN — ZINC OXIDE 1 APPLICATION(S): 200 OINTMENT TOPICAL at 13:00

## 2023-12-10 RX ADMIN — Medication 1 APPLICATION(S): at 13:09

## 2023-12-10 RX ADMIN — QUETIAPINE FUMARATE 12.5 MILLIGRAM(S): 200 TABLET, FILM COATED ORAL at 19:21

## 2023-12-10 RX ADMIN — LIDOCAINE 1 PATCH: 4 CREAM TOPICAL at 13:08

## 2023-12-10 RX ADMIN — Medication 3 MILLILITER(S): at 05:52

## 2023-12-10 RX ADMIN — PHENYLEPHRINE-SHARK LIVER OIL-MINERAL OIL-PETROLATUM RECTAL OINTMENT 1 APPLICATION(S): at 13:00

## 2023-12-10 RX ADMIN — INSULIN HUMAN 11 UNIT(S): 100 INJECTION, SOLUTION SUBCUTANEOUS at 19:27

## 2023-12-10 RX ADMIN — Medication 1000 MILLIGRAM(S): at 22:00

## 2023-12-10 RX ADMIN — CHLORHEXIDINE GLUCONATE 15 MILLILITER(S): 213 SOLUTION TOPICAL at 06:07

## 2023-12-10 RX ADMIN — Medication 200 MILLIGRAM(S): at 18:23

## 2023-12-10 RX ADMIN — GABAPENTIN 100 MILLIGRAM(S): 400 CAPSULE ORAL at 21:17

## 2023-12-10 RX ADMIN — INSULIN HUMAN 11 UNIT(S): 100 INJECTION, SOLUTION SUBCUTANEOUS at 13:18

## 2023-12-10 RX ADMIN — PANTOPRAZOLE SODIUM 40 MILLIGRAM(S): 20 TABLET, DELAYED RELEASE ORAL at 10:02

## 2023-12-10 RX ADMIN — Medication 5 MILLIGRAM(S): at 06:06

## 2023-12-10 RX ADMIN — Medication 125 MICROGRAM(S): at 04:58

## 2023-12-10 RX ADMIN — Medication 2 DROP(S): at 06:05

## 2023-12-10 RX ADMIN — CARBAMIDE PEROXIDE 1 DROP(S): 81.86 SOLUTION/ DROPS AURICULAR (OTIC) at 06:23

## 2023-12-10 RX ADMIN — Medication 1: at 00:21

## 2023-12-10 RX ADMIN — SIMETHICONE 80 MILLIGRAM(S): 80 TABLET, CHEWABLE ORAL at 23:15

## 2023-12-10 RX ADMIN — Medication 1 APPLICATION(S): at 18:24

## 2023-12-10 RX ADMIN — CHLORHEXIDINE GLUCONATE 15 MILLILITER(S): 213 SOLUTION TOPICAL at 18:22

## 2023-12-10 RX ADMIN — SIMETHICONE 80 MILLIGRAM(S): 80 TABLET, CHEWABLE ORAL at 06:05

## 2023-12-10 RX ADMIN — INSULIN GLARGINE 18 UNIT(S): 100 INJECTION, SOLUTION SUBCUTANEOUS at 09:26

## 2023-12-10 RX ADMIN — SODIUM CHLORIDE 4 MILLILITER(S): 9 INJECTION INTRAMUSCULAR; INTRAVENOUS; SUBCUTANEOUS at 18:03

## 2023-12-10 RX ADMIN — NYSTATIN CREAM 1 APPLICATION(S): 100000 CREAM TOPICAL at 21:20

## 2023-12-10 RX ADMIN — LIDOCAINE 1 PATCH: 4 CREAM TOPICAL at 19:10

## 2023-12-10 RX ADMIN — Medication 2 DROP(S): at 23:17

## 2023-12-10 RX ADMIN — Medication 2 DROP(S): at 18:23

## 2023-12-10 RX ADMIN — INSULIN HUMAN 22 UNIT(S): 100 INJECTION, SOLUTION SUBCUTANEOUS at 06:17

## 2023-12-10 RX ADMIN — SIMETHICONE 80 MILLIGRAM(S): 80 TABLET, CHEWABLE ORAL at 13:10

## 2023-12-10 RX ADMIN — Medication 3 MILLILITER(S): at 11:50

## 2023-12-10 RX ADMIN — NYSTATIN CREAM 1 APPLICATION(S): 100000 CREAM TOPICAL at 06:07

## 2023-12-10 NOTE — PROGRESS NOTE ADULT - PROBLEM SELECTOR PLAN 2
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   - S/p CT abd/pelvis (10/28): Sacral decubitus ulcer extending to the coccyx with osseous remodeling and pelvic MRI or bone scan to recc to exclude osteomyelitis.  - 10/30 wound care note: Sacral stage 4 pressure injury 4.5cm x 2.5cm x 1.5cm w/ lip of undermining circumferentially greatest 9-12 of 3cm.  - MRI pelvis 10/30 with Osteomyelitis, c/w current Cefepime for 7 days, Vancomycin d/marli   - Elevated, ESR 85   - Elevated,    - Wound care on board  - 11/10: resumed Cipro 500mg q 12 and Keflex 500mg q 6 until 12/10.  - 11/20: Changed to IV Cipro 400 q12 as recommended by ID pharmacist due to multiple drug interactions when given via PEG  - 11/28 wound care note: Sacral stage 4pressure injury 4.5cm x 2.5cm x 0.5cm, moist granular wound bed   palpable but not exposed bone no blistering, (+) serosanguinous drainage. No odor, erythema, increased warmth, tenderness, induration, fluctuance, nor crepitus.  - 12/3 wound culture ordered - d/c'd as no need for culture, wound improving, no signs of infection 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   - S/p CT abd/pelvis (10/28): Sacral decubitus ulcer extending to the coccyx with osseous remodeling and pelvic MRI or bone scan to recc to exclude osteomyelitis.  - 10/30 wound care note: Sacral stage 4 pressure injury 4.5cm x 2.5cm x 1.5cm w/ lip of undermining circumferentially greatest 9-12 of 3cm.  - MRI pelvis 10/30 with Osteomyelitis, completed Cefepime for 7 days, Vancomycin d/marli   - 11/10: resumed Cipro 500mg q 12 and Keflex 500mg q 6 until 12/10.  - 11/20: Changed to IV Cipro 400 q12 as recommended by ID pharmacist due to multiple drug interactions when given via PEG  - 11/28 wound care note: Sacral stage 4pressure injury 4.5cm x 2.5cm x 0.5cm, moist granular wound bed   palpable but not exposed bone no blistering, (+) serosanguinous drainage. No odor, erythema, increased warmth, tenderness, induration, fluctuance, nor crepitus.  - 12/3 wound culture ordered - d/c'd as no need for culture, wound improving, no signs of infection  - wound care following

## 2023-12-10 NOTE — PROGRESS NOTE ADULT - PROBLEM SELECTOR PLAN 4
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   - Bowel regimen  - PEG Site appears macerated. Multifactorial, possible the PEG has been too tight at home.  - Peg loosened by GI at beside, no leakage or further intervention required   - recurrent RLQ abdominal pain and bleeding at the PEG site  - reevaluated by GI -- no bleeding at the PEG site  - no BM since 11/19 -- increased dose of Senna and increased Miralax to BID   - 11/28 frequent multiple BMs, senna and Miralax d/c'd - GI evaluated  - Continue Simethicone.  - 12/1: s/p abdominal ultrasound - limited study   - (12/3): Pt is c/o abd pain, and daughter is concerned   - AM amylase and Lipase all wnl , CT A/P 12/3 - no acute findings 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   - PEG Site appears macerated --> Multifactorial, possible the PEG has been too tight at home  - Peg loosened by GI at beside, no leakage or further intervention required   - recurrent RLQ abdominal pain and bleeding at the PEG site  - reevaluated by GI -- no bleeding at the PEG site  - 12/1: s/p abdominal ultrasound - limited study   - (12/3): Pt is c/o abd pain, and daughter is concerned   - AM amylase and Lipase all wnl , CT A/P 12/3 - no acute findings  - Continue Simethicone

## 2023-12-10 NOTE — PROGRESS NOTE ADULT - ATTENDING COMMENTS
72 y/o F with PMHx of T2DM, HTN, HLD, anemia, hypothyroidism, RA, fibromyalgia, remote cerebral aneurysm repair, acute hypercapnic respiratory failure now with tracheostomy, PEG tube, and bedbound (trach change 8/18, PEG change 8/14), sacral pressure ulcer, BIBEMS from home for 6 day hx of bleeding from the tracheostomy and PEG tube with associated abdominal pain, recent history of auditory and visual hallucinations, and with chronic sacral decubitus ulcer. Improved on antibiotics for osteomyelitis and staph/pseud  in sputum.  She is hd stable, afebrile, on vent (chronic) There are no acute issues today.  AGree with plan as outlined above. 72 y/o F with PMHx of T2DM, HTN, HLD, anemia, hypothyroidism, RA, fibromyalgia, remote cerebral aneurysm repair, acute hypercapnic respiratory failure now with tracheostomy, PEG tube, and bedbound (trach change 8/18, PEG change 8/14), sacral pressure ulcer, BIBEMS from home for 6 day hx of bleeding from the tracheostomy and PEG tube with associated abdominal pain, recent history of auditory and visual hallucinations, and with chronic sacral decubitus ulcer. Improved on antibiotics for osteomyelitis and staph/pseud  in sputum.  She is hd stable, afebrile, on vent (chronic) There are no acute issues today.  Potential discharge early this week.  AGree with plan as outlined above.

## 2023-12-10 NOTE — PROGRESS NOTE ADULT - ASSESSMENT
72 y/o F with PMHx of T2DM, HTN, HLD, anemia, hypothyroidism, RA, fibromyalgia, remote cerebral aneurysm repair, acute hypercapnic respiratory failure now with tracheostomy, PEG tube, and bedbound (trach change 8/18, PEG change 8/14), sacral pressure ulcer, BIBEMS from home for 6 day hx of bleeding from the tracheostomy and PEG tube with associated abdominal pain, recent history of auditory and visual hallucinations, and with chronic sacral decubitus ulcer. Exam notable for mild abdominal distention with LLQ and RLQ tenderness, tracheostomy in place with no obvious bleeding, PEG tube with scant amount of dried blood, at baseline neurologic status. Imaging showing no active bleeding around tracheostomy site, however was notable for mild thickening along PEG tube tract, sacral ulcer extending to the coccyx suggestive of possible osteomyelitis, and bibasilar patchy lung opacities with volume loss. Patient admitted for respiratory support, wound care of tracheostomy and PEG, and management of sacral osteomyelitis. No further Trach and peg site bleeding noted since admission.  Pelvic MRI imaging also suggestive of Acute OM. Patient placed on long-term antibiotics with Infectious disease guidance.  Additionally treated with a 7d course of Cefepime due to PSA and Serratia tracheitis on 11/8-->11/15 and then resumed cipro/keflex.  Hospital course c/b Delirium for which Seroquel was added and home Lexapro continued. Tracheostomy changed to #9 Cuffed [place] by ENT 11/3.  Despite trach change patient remained with persistent air leak.  On 11/19, the trach was again changed due to leak and loss of volumes on vent.  Trach was changed to #8 distal cuffed Shiley.  Patient seen by Dermatology for back lesions.  One diagnosed as a seborrheic keratitis and the other a dermatofibroma.  Intermittent abdominal pain throughout hospital course, at times relieved with gas/BM, w/u negative, seen by GI - no recs.  Remains medically stable while pending discharge home.      12/8: Pts daughters daughter now flu +.  Plan to discharge early next week. 74 y/o F with PMHx of T2DM, HTN, HLD, anemia, hypothyroidism, RA, fibromyalgia, remote cerebral aneurysm repair, acute hypercapnic respiratory failure now with tracheostomy, PEG tube, and bedbound (trach change 8/18, PEG change 8/14), sacral pressure ulcer, BIBEMS from home for 6 day hx of bleeding from the tracheostomy and PEG tube with associated abdominal pain, recent history of auditory and visual hallucinations, and with chronic sacral decubitus ulcer. Exam notable for mild abdominal distention with LLQ and RLQ tenderness, tracheostomy in place with no obvious bleeding, PEG tube with scant amount of dried blood, at baseline neurologic status. Imaging showing no active bleeding around tracheostomy site, however was notable for mild thickening along PEG tube tract, sacral ulcer extending to the coccyx suggestive of possible osteomyelitis, and bibasilar patchy lung opacities with volume loss. Patient admitted for respiratory support, wound care of tracheostomy and PEG, and management of sacral osteomyelitis. No further Trach and peg site bleeding noted since admission.  Pelvic MRI imaging also suggestive of Acute OM. Patient placed on long-term antibiotics with Infectious disease guidance.  Additionally treated with a 7d course of Cefepime due to PSA and Serratia tracheitis on 11/8-->11/15 and then resumed cipro/keflex.  Hospital course c/b Delirium for which Seroquel was added and home Lexapro continued. Tracheostomy changed to #9 Cuffed [place] by ENT 11/3.  Despite trach change patient remained with persistent air leak.  On 11/19, the trach was again changed due to leak and loss of volumes on vent.  Trach was changed to #8 distal cuffed [place]iley.  Patient seen by Dermatology for back lesions.  One diagnosed as a seborrheic keratitis and the other a dermatofibroma.  Intermittent abdominal pain throughout hospital course, at times relieved with gas/BM, w/u negative, seen by GI - no recs.  Remains medically stable while pending discharge home.      12/10: 72 y/o F with PMHx of T2DM, HTN, HLD, anemia, hypothyroidism, RA, fibromyalgia, remote cerebral aneurysm repair, acute hypercapnic respiratory failure now with tracheostomy, PEG tube, and bedbound (trach change 8/18, PEG change 8/14), sacral pressure ulcer, BIBEMS from home for 6 day hx of bleeding from the tracheostomy and PEG tube with associated abdominal pain, recent history of auditory and visual hallucinations, and with chronic sacral decubitus ulcer. Exam notable for mild abdominal distention with LLQ and RLQ tenderness, tracheostomy in place with no obvious bleeding, PEG tube with scant amount of dried blood, at baseline neurologic status. Imaging showing no active bleeding around tracheostomy site, however was notable for mild thickening along PEG tube tract, sacral ulcer extending to the coccyx suggestive of possible osteomyelitis, and bibasilar patchy lung opacities with volume loss. Patient admitted for respiratory support, wound care of tracheostomy and PEG, and management of sacral osteomyelitis. No further Trach and peg site bleeding noted since admission.  Pelvic MRI imaging also suggestive of Acute OM. Patient placed on long-term antibiotics with Infectious disease guidance.  Additionally treated with a 7d course of Cefepime due to PSA and Serratia tracheitis on 11/8-->11/15 and then resumed cipro/keflex.  Hospital course c/b Delirium for which Seroquel was added and home Lexapro continued. Tracheostomy changed to #9 Cuffed [place] by ENT 11/3.  Despite trach change patient remained with persistent air leak.  On 11/19, the trach was again changed due to leak and loss of volumes on vent.  Trach was changed to #8 distal cuffed [place].  Patient seen by Dermatology for back lesions.  One diagnosed as a seborrheic keratitis and the other a dermatofibroma.  Intermittent abdominal pain throughout hospital course, at times relieved with gas/BM, w/u negative, seen by GI - no recs.  Remains medically stable while pending discharge home.      12/10:  Stable throughout the day.  Cipro and Keflex to be completed today.  74 y/o F with PMHx of T2DM, HTN, HLD, anemia, hypothyroidism, RA, fibromyalgia, remote cerebral aneurysm repair, acute hypercapnic respiratory failure now with tracheostomy, PEG tube, and bedbound (trach change 8/18, PEG change 8/14), sacral pressure ulcer, BIBEMS from home for 6 day hx of bleeding from the tracheostomy and PEG tube with associated abdominal pain, recent history of auditory and visual hallucinations, and with chronic sacral decubitus ulcer. Exam notable for mild abdominal distention with LLQ and RLQ tenderness, tracheostomy in place with no obvious bleeding, PEG tube with scant amount of dried blood, at baseline neurologic status. Imaging showing no active bleeding around tracheostomy site, however was notable for mild thickening along PEG tube tract, sacral ulcer extending to the coccyx suggestive of possible osteomyelitis, and bibasilar patchy lung opacities with volume loss. Patient admitted for respiratory support, wound care of tracheostomy and PEG, and management of sacral osteomyelitis. No further Trach and peg site bleeding noted since admission.  Pelvic MRI imaging also suggestive of Acute OM. Patient placed on long-term antibiotics with Infectious disease guidance.  Additionally treated with a 7d course of Cefepime due to PSA and Serratia tracheitis on 11/8-->11/15 and then resumed cipro/keflex.  Hospital course c/b Delirium for which Seroquel was added and home Lexapro continued. Tracheostomy changed to #9 Cuffed [place] by ENT 11/3.  Despite trach change patient remained with persistent air leak.  On 11/19, the trach was again changed due to leak and loss of volumes on vent.  Trach was changed to #8 distal cuffed [place].  Patient seen by Dermatology for back lesions.  One diagnosed as a seborrheic keratitis and the other a dermatofibroma.  Intermittent abdominal pain throughout hospital course, at times relieved with gas/BM, w/u negative, seen by GI - no recs.  Remains medically stable while pending discharge home.      12/10:  Stable throughout the day.  Cipro and Keflex to be completed today.

## 2023-12-10 NOTE — PROGRESS NOTE ADULT - SUBJECTIVE AND OBJECTIVE BOX
Patient is a 73y old  Female who presents with a chief complaint of bleeding (04 Dec 2023 16:57)      Interval Events:    REVIEW OF SYSTEMS:  [ ] Positive  [ ] All other systems negative  [ ] Unable to assess ROS because ________    Vital Signs Last 24 Hrs  T(C): 37.2 (12-10-23 @ 06:09), Max: 37.2 (12-09-23 @ 23:28)  T(F): 99 (12-10-23 @ 06:09), Max: 99 (12-10-23 @ 06:09)  HR: 81 (12-10-23 @ 06:09) (70 - 96)  BP: 122/55 (12-10-23 @ 06:09) (102/54 - 122/55)  RR: 16 (12-09-23 @ 23:28) (16 - 20)  SpO2: 100% (12-10-23 @ 06:09) (100% - 100%)    PHYSICAL EXAM:  HEENT:   [ ]Tracheostomy:  [ ]Pupils equal  [ ]No oral lesions  [ ]Abnormal    SKIN  [ ]No Rash  [ ] Abnormal  [ ] pressure    CARDIAC  [ ]Regular  [ ]Abnormal    PULMONARY  [ ]Bilateral Clear Breath Sounds  [ ]Normal Excursion  [ ]Abnormal    GI  [ ]PEG      [ ] +BS		              [ ]Soft, nondistended, nontender	  [ ]Abnormal    MUSCULOSKELETAL                                   [ ]Bedbound                 [ ]Abnormal    [ ]Ambulatory/OOB to chair                           EXTREMITIES                                         [ ]Normal  [ ]Edema                           NEUROLOGIC  [ ] Normal, non focal  [ ] Focal findings:    PSYCHIATRIC  [ ]Alert and appropriate  [ ] Sedated	 [ ]Agitated    :  Mcbride: [ ] Yes, if yes: Date of Placement:                   [  ] No    LINES: Central Lines [ ] Yes, if yes: Date of Placement                                     [  ] No    HOSPITAL MEDICATIONS:  MEDICATIONS  (STANDING):  albuterol/ipratropium for Nebulization 3 milliLiter(s) Nebulizer every 6 hours  amLODIPine   Tablet 10 milliGRAM(s) Oral daily  artificial  tears Solution 2 Drop(s) Both EYES four times a day  ascorbic acid 500 milliGRAM(s) Oral daily  calcium carbonate    500 mG (Tums) Chewable 1 Tablet(s) Chew every 12 hours  cephalexin 500 milliGRAM(s) Oral every 6 hours  chlorhexidine 0.12% Liquid 15 milliLiter(s) Oral Mucosa every 12 hours  chlorhexidine 4% Liquid 1 Application(s) Topical <User Schedule>  ciprofloxacin   IVPB 400 milliGRAM(s) IV Intermittent every 12 hours  dextrose 50% Injectable 25 Gram(s) IV Push once  dextrose 50% Injectable 12.5 Gram(s) IV Push once  dextrose 50% Injectable 50 milliLiter(s) IV Push once  escitalopram 10 milliGRAM(s) Oral <User Schedule>  fentaNYL   Patch  25 MICROgram(s)/Hr 1 Patch Transdermal every 72 hours  gabapentin Solution 100 milliGRAM(s) Enteral Tube at bedtime  glucagon  Injectable 1 milliGRAM(s) IntraMuscular once  hemorrhoidal Ointment      hemorrhoidal Ointment 1 Application(s) Rectal daily  insulin glargine Injectable (LANTUS) 18 Unit(s) SubCutaneous every morning  insulin lispro (ADMELOG) corrective regimen sliding scale   SubCutaneous every 6 hours  insulin regular  human recombinant 11 Unit(s) SubCutaneous <User Schedule>  insulin regular  human recombinant 22 Unit(s) SubCutaneous <User Schedule>  levothyroxine 125 MICROGram(s) Oral <User Schedule>  lidocaine   4% Patch 1 Patch Transdermal daily  melatonin 5 milliGRAM(s) Oral at bedtime  melatonin 1 milliGRAM(s) Oral at bedtime  multivitamin/minerals/iron Oral Solution (CENTRUM) 15 milliLiter(s) Oral daily  nystatin Powder 1 Application(s) Topical every 8 hours  pantoprazole   Suspension 40 milliGRAM(s) Enteral Tube <User Schedule>  petrolatum Ophthalmic Ointment 1 Application(s) Both EYES four times a day  predniSONE   Tablet 5 milliGRAM(s) Oral daily  QUEtiapine 12.5 milliGRAM(s) Oral <User Schedule>  simethicone 80 milliGRAM(s) Chew four times a day  sodium chloride 3%  Inhalation 4 milliLiter(s) Inhalation every 12 hours  zinc oxide 40% Paste 1 Application(s) Topical daily    MEDICATIONS  (PRN):  acetaminophen   Oral Liquid .. 1000 milliGRAM(s) Enteral Tube every 6 hours PRN Temp greater or equal to 38C (100.4F), Mild Pain (1 - 3)  benzocaine 20% Spray 1 Spray(s) Topical four times a day PRN Cough  diclofenac sodium 1% Gel 2 Gram(s) Topical four times a day PRN pain  diphenhydrAMINE Elixir 25 milliGRAM(s) Oral every 6 hours PRN Rash and/or Itching  guaifenesin/dextromethorphan Oral Liquid 10 milliLiter(s) Oral every 6 hours PRN Cough  sodium chloride 0.65% Nasal 1 Spray(s) Both Nostrils every 6 hours PRN Nasal Congestion      LABS:                        9.5    7.89  )-----------( 116      ( 09 Dec 2023 10:47 )             32.0     12-09    132<L>  |  97  |  20  ----------------------------<  211<H>  4.4   |  25  |  <0.30<L>    Ca    9.5      09 Dec 2023 10:47  Phos  4.0     12-09  Mg     1.7     12-09        Urinalysis Basic - ( 09 Dec 2023 10:47 )    Color: x / Appearance: x / SG: x / pH: x  Gluc: 211 mg/dL / Ketone: x  / Bili: x / Urobili: x   Blood: x / Protein: x / Nitrite: x   Leuk Esterase: x / RBC: x / WBC x   Sq Epi: x / Non Sq Epi: x / Bacteria: x          CAPILLARY BLOOD GLUCOSE    MICROBIOLOGY:     RADIOLOGY:  [ ] Reviewed and interpreted by me    Mode: AC/ CMV (Assist Control/ Continuous Mandatory Ventilation)  RR (machine): 12  TV (machine): 300  FiO2: 30  PEEP: 5  ITime: 1  MAP: 10  PIP: 30   Patient is a 73y old  Female who presents with a chief complaint of bleeding (04 Dec 2023 16:57)      Interval Events:  No acute events overnight.     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     CARDIAC  [X]Regular  [ ]Abnormal    PULMONARY  [ ]Bilateral Clear Breath Sounds  [ ]Normal Excursion  [X]Abnormal - BL Coarse BS    GI  [X]PEG      [X] +BS		              [X]Soft, nondistended, nontender	  [ ]Abnormal    MUSCULOSKELETAL                                   [X]Bedbound                 [ ]Abnormal    [ ]Ambulatory/OOB to chair                           EXTREMITIES                                         [X]Normal  [ ]Edema                           NEUROLOGIC  [ ] Normal, non focal  [X] Focal findings: opens eyes spontaneously, follows commands on all 4 extremities, able to mouth words    PSYCHIATRIC  [X]Alert and appropriate  [ ] Sedated	 [ ]Agitated    :  Mcbride: [ ] Yes, if yes: Date of Placement:                   [X] No    LINES: Central Lines [ ] Yes, if yes: Date of Placement                                     [X] No    HOSPITAL MEDICATIONS:  MEDICATIONS  (STANDING):  albuterol/ipratropium for Nebulization 3 milliLiter(s) Nebulizer every 6 hours  amLODIPine   Tablet 10 milliGRAM(s) Oral daily  artificial  tears Solution 2 Drop(s) Both EYES four times a day  ascorbic acid 500 milliGRAM(s) Oral daily  calcium carbonate    500 mG (Tums) Chewable 1 Tablet(s) Chew every 12 hours  cephalexin 500 milliGRAM(s) Oral every 6 hours  chlorhexidine 0.12% Liquid 15 milliLiter(s) Oral Mucosa every 12 hours  chlorhexidine 4% Liquid 1 Application(s) Topical <User Schedule>  ciprofloxacin   IVPB 400 milliGRAM(s) IV Intermittent every 12 hours  dextrose 50% Injectable 25 Gram(s) IV Push once  dextrose 50% Injectable 12.5 Gram(s) IV Push once  dextrose 50% Injectable 50 milliLiter(s) IV Push once  escitalopram 10 milliGRAM(s) Oral <User Schedule>  fentaNYL   Patch  25 MICROgram(s)/Hr 1 Patch Transdermal every 72 hours  gabapentin Solution 100 milliGRAM(s) Enteral Tube at bedtime  glucagon  Injectable 1 milliGRAM(s) IntraMuscular once  hemorrhoidal Ointment      hemorrhoidal Ointment 1 Application(s) Rectal daily  insulin glargine Injectable (LANTUS) 18 Unit(s) SubCutaneous every morning  insulin lispro (ADMELOG) corrective regimen sliding scale   SubCutaneous every 6 hours  insulin regular  human recombinant 11 Unit(s) SubCutaneous <User Schedule>  insulin regular  human recombinant 22 Unit(s) SubCutaneous <User Schedule>  levothyroxine 125 MICROGram(s) Oral <User Schedule>  lidocaine   4% Patch 1 Patch Transdermal daily  melatonin 5 milliGRAM(s) Oral at bedtime  melatonin 1 milliGRAM(s) Oral at bedtime  multivitamin/minerals/iron Oral Solution (CENTRUM) 15 milliLiter(s) Oral daily  nystatin Powder 1 Application(s) Topical every 8 hours  pantoprazole   Suspension 40 milliGRAM(s) Enteral Tube <User Schedule>  petrolatum Ophthalmic Ointment 1 Application(s) Both EYES four times a day  predniSONE   Tablet 5 milliGRAM(s) Oral daily  QUEtiapine 12.5 milliGRAM(s) Oral <User Schedule>  simethicone 80 milliGRAM(s) Chew four times a day  sodium chloride 3%  Inhalation 4 milliLiter(s) Inhalation every 12 hours  zinc oxide 40% Paste 1 Application(s) Topical daily    MEDICATIONS  (PRN):  acetaminophen   Oral Liquid .. 1000 milliGRAM(s) Enteral Tube every 6 hours PRN Temp greater or equal to 38C (100.4F), Mild Pain (1 - 3)  benzocaine 20% Spray 1 Spray(s) Topical four times a day PRN Cough  diclofenac sodium 1% Gel 2 Gram(s) Topical four times a day PRN pain  diphenhydrAMINE Elixir 25 milliGRAM(s) Oral every 6 hours PRN Rash and/or Itching  guaifenesin/dextromethorphan Oral Liquid 10 milliLiter(s) Oral every 6 hours PRN Cough  sodium chloride 0.65% Nasal 1 Spray(s) Both Nostrils every 6 hours PRN Nasal Congestion      LABS:                        9.5    7.89  )-----------( 116      ( 09 Dec 2023 10:47 )             32.0     12-09    132<L>  |  97  |  20  ----------------------------<  211<H>  4.4   |  25  |  <0.30<L>    Ca    9.5      09 Dec 2023 10:47  Phos  4.0     12-09  Mg     1.7     12-09        Urinalysis Basic - ( 09 Dec 2023 10:47 )    Color: x / Appearance: x / SG: x / pH: x  Gluc: 211 mg/dL / Ketone: x  / Bili: x / Urobili: x   Blood: x / Protein: x / Nitrite: x   Leuk Esterase: x / RBC: x / WBC x   Sq Epi: x / Non Sq Epi: x / Bacteria: x          CAPILLARY BLOOD GLUCOSE    MICROBIOLOGY:     RADIOLOGY:  [ ] Reviewed and interpreted by me    Mode: AC/ CMV (Assist Control/ Continuous Mandatory Ventilation)  RR (machine): 12  TV (machine): 300  FiO2: 30  PEEP: 5  ITime: 1  MAP: 10  PIP: 30

## 2023-12-10 NOTE — PROGRESS NOTE ADULT - PROBLEM SELECTOR PLAN 7
- s/p Home Levemir 14 units in morning held on admission as noted w/ hypoglycemia   - Enteral feed changed to [employer] diabetic formula; glucose numbers stabilizing. - s/p Home Levemir 14 units in morning held on admission as noted w/ hypoglycemia   - Enteral feed changed to [employer] diabetic formula; glucose numbers stabilizing. - s/p Home Levemir 14 units in morning held on admission as noted w/ hypoglycemia   - Enteral feed changed to [employer] diabetic formula; glucose numbers stabilizing  - adjustments made throughout admission per endocrine recs - s/p Home Levemir 14 units in morning held on admission as noted w/ hypoglycemia   - Enteral feed changed to [employer] diabetic formula; glucose numbers stabilizing  - adjustments made throughout admission per endocrine recs

## 2023-12-10 NOTE — CHART NOTE - NSCHARTNOTEFT_GEN_A_CORE
74 y/o F w/h/o T2DM with unknown control due to anemia of chronic disease skewing A1C levels. On Levemir and Humalog insulin PTA. Unknown DM complications. Also h/o HTN, HLD, anemia, hypothyroidism, RA, fibromyalgia, remote cerebral aneurysm repair, acute hypercapnic respiratory failure now with tracheostomy, PEG tube, and bedbound (trach change 8/18, PEG change 8/14), sacral pressure ulcer, BIBEMS from home for 6 day hx of bleeding from the tracheostomy and PEG tube with associated abdominal pain> now resolved. Endocrinology consulted for hyperglycemia. Tolerating TFs at goal 60 ml/hr from 6am to 2am with. BG goal 100 to low 200s due to age and comorbidities and AMS. 12 noon BGs in 200s.         Diet, NPO with Tube Feed:   Tube Feeding Modality: Gastrostomy  [employer] glucose support 1.2  Total Volume for 24 Hours (mL): 1200  Continuous  Starting Tube Feed Rate {mL per Hour}: 60  Increase Tube Feed Rate by (mL): 0  Until Goal Tube Feed Rate (mL per Hour): 60  Tube Feed Duration (in Hours): 20  Tube Feed Start Time: 06:00  Tube Feed Stop Time: 02:00  Free Water Flush  Pump   Rate (mL per Hour): 10   Frequency: Every 2 Hours  Alfred(7 Gm Arginine/7 Gm Glut/1.2 Gm HMB     Qty per Day:  2 (11-29-23 @ 14:12) [Active]      POCT Blood Glucose:  103 mg/dL (12-10-23 @ 19:24)  133 mg/dL (12-10-23 @ 18:02)  203 mg/dL (12-10-23 @ 13:14)  165 mg/dL (12-10-23 @ 09:56)  143 mg/dL (12-10-23 @ 06:06)  182 mg/dL (12-10-23 @ 00:04)      eMAR:  insulin glargine Injectable (LANTUS)   18 Unit(s) SubCutaneous (12-10-23 @ 09:26)    insulin lispro (ADMELOG) corrective regimen sliding scale   2  SubCutaneous (12-10-23 @ 13:14)   1 Unit(s) SubCutaneous (12-10-23 @ 00:21)    insulin regular  human recombinant   11 Unit(s) SubCutaneous (12-10-23 @ 19:27)   11 Unit(s) SubCutaneous (12-10-23 @ 13:18)    insulin regular  human recombinant   22 Unit(s) SubCutaneous (12-10-23 @ 06:17)    levothyroxine   125 MICROGram(s) Oral (12-10-23 @ 04:58)    predniSONE   Tablet   5 milliGRAM(s) Oral (12-10-23 @ 06:06)          # Type 2 diabetes mellitus with hyperglycemia, with long-term current use of insulin.   -Test BG q6h while on TFs/NPO  - Continue Lantus to 18 units daily in am   -Adjust Regular insulin 24 at 6am, 11 units at 12 noon and 6pm. PLEASE DO NOT HOLD. CAN GIVE LOWER DOSE IF CONCERN FOR HYPOGLYCEMIA.   -C/W low dose admelog correction scale every 6 hours to low dose since pt hardly needs any correction scale at this time.   -Will follow and adjust insulin as needed  - Please keep hypoglycemia protocol in place     Nissa Veliz NP-C   Contact via Microsoft Teams during business hours  To reach covering provider access AMION via sunrise tools  For Urgent matters/after-hours/weekends/holidays please page endocrine fellow on call   For nonurgent matters please email [email or web address]    Please note that this patient may be followed by different provider tomorrow.  Notify endocrine 24 hours prior to discharge for final recommendations. 72 y/o F w/h/o T2DM with unknown control due to anemia of chronic disease skewing A1C levels. On Levemir and Humalog insulin PTA. Unknown DM complications. Also h/o HTN, HLD, anemia, hypothyroidism, RA, fibromyalgia, remote cerebral aneurysm repair, acute hypercapnic respiratory failure now with tracheostomy, PEG tube, and bedbound (trach change 8/18, PEG change 8/14), sacral pressure ulcer, BIBEMS from home for 6 day hx of bleeding from the tracheostomy and PEG tube with associated abdominal pain> now resolved. Endocrinology consulted for hyperglycemia. Tolerating TFs at goal 60 ml/hr from 6am to 2am with. BG goal 100 to low 200s due to age and comorbidities and AMS. 12 noon BGs in 200s.         Diet, NPO with Tube Feed:   Tube Feeding Modality: Gastrostomy  [employer] glucose support 1.2  Total Volume for 24 Hours (mL): 1200  Continuous  Starting Tube Feed Rate {mL per Hour}: 60  Increase Tube Feed Rate by (mL): 0  Until Goal Tube Feed Rate (mL per Hour): 60  Tube Feed Duration (in Hours): 20  Tube Feed Start Time: 06:00  Tube Feed Stop Time: 02:00  Free Water Flush  Pump   Rate (mL per Hour): 10   Frequency: Every 2 Hours  Alfred(7 Gm Arginine/7 Gm Glut/1.2 Gm HMB     Qty per Day:  2 (11-29-23 @ 14:12) [Active]      POCT Blood Glucose:  103 mg/dL (12-10-23 @ 19:24)  133 mg/dL (12-10-23 @ 18:02)  203 mg/dL (12-10-23 @ 13:14)  165 mg/dL (12-10-23 @ 09:56)  143 mg/dL (12-10-23 @ 06:06)  182 mg/dL (12-10-23 @ 00:04)      eMAR:  insulin glargine Injectable (LANTUS)   18 Unit(s) SubCutaneous (12-10-23 @ 09:26)    insulin lispro (ADMELOG) corrective regimen sliding scale   2  SubCutaneous (12-10-23 @ 13:14)   1 Unit(s) SubCutaneous (12-10-23 @ 00:21)    insulin regular  human recombinant   11 Unit(s) SubCutaneous (12-10-23 @ 19:27)   11 Unit(s) SubCutaneous (12-10-23 @ 13:18)    insulin regular  human recombinant   22 Unit(s) SubCutaneous (12-10-23 @ 06:17)    levothyroxine   125 MICROGram(s) Oral (12-10-23 @ 04:58)    predniSONE   Tablet   5 milliGRAM(s) Oral (12-10-23 @ 06:06)          # Type 2 diabetes mellitus with hyperglycemia, with long-term current use of insulin.   -Test BG q6h while on TFs/NPO  - Continue Lantus to 18 units daily in am   -Adjust Regular insulin 24 at 6am, 11 units at 12 noon and 6pm. PLEASE DO NOT HOLD. CAN GIVE LOWER DOSE IF CONCERN FOR HYPOGLYCEMIA.   -C/W low dose admelog correction scale every 6 hours to low dose since pt hardly needs any correction scale at this time.   -Will follow and adjust insulin as needed  - Please keep hypoglycemia protocol in place     Nissa Veliz NP-C   Contact via Microsoft Teams during business hours  To reach covering provider access AMION via sunrise tools  For Urgent matters/after-hours/weekends/holidays please page endocrine fellow on call   For nonurgent matters please email [email or web address]    Please note that this patient may be followed by different provider tomorrow.  Notify endocrine 24 hours prior to discharge for final recommendations.

## 2023-12-10 NOTE — PROGRESS NOTE ADULT - PROBLEM SELECTOR PLAN 3
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  11/18 RR increased to 14 due to hypercarbia from trach collar trial  - Tolerates intermittent PSV 15/5 during day/ Vent HS  - Airway Clearance: Duoneb, Hypersal, Chest PT, PRN suctioning   - Maintain 02 sat > 92%  Tracheal Bleeding (Intermittent)-->resolved since admission   -S/p CT Angio neck: No evidence of active bleeding around tracheostomy site. No hematoma.  No collection   - Seen by ENT; Kath and b/l bronchi visualized without any trauma. small amount of blood tinged secretions resting at beginning of right bronchus not actively bleeding.  - 11/3 trach changed to #9 Portex by ENT  - 11/17: Due to persistent air leak and volume loss on vent, trach again changed by ENT to #8 distal cuffed Shiley.  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  - tolerates PS 15/5 Chronic Trach/ Vent dependant 2/2 Hypercapnic Resp Failure since 10/2022   - S/p Trach # 8 Cuffed Flex Shiley; trach change 8/18, PEG change 8/14 per records   - Continue Home vent settings: (300 TV/ RR 12/5 Peep/ Fio2 30%)  - 11/3 trach changed to #9 Portex by ENT  - 11/18 RR increased to 14 due to hypercarbia from trach collar trial  - 11/17: Due to persistent air leak and volume loss on vent, trach again changed by ENT to #8 distal cuffed Shiley, 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  - Tolerates intermittent PSV 15/5 during day/Vent HS  - Airway Clearance: Duoneb, Hypersal, Chest PT, PRN suctioning   - Maintain 02 sat > 92%    Tracheal Bleeding (Intermittent)-->resolved since admission   - S/p CT Angio neck: No evidence of active bleeding around tracheostomy site. No hematoma.  No collection   - Seen by ENT; Kath and b/l bronchi visualized without any trauma, small amount of blood tinged secretions resting at beginning of right bronchus not actively bleeding.

## 2023-12-11 LAB
GLUCOSE BLDC GLUCOMTR-MCNC: 102 MG/DL — HIGH (ref 70–99)
GLUCOSE BLDC GLUCOMTR-MCNC: 102 MG/DL — HIGH (ref 70–99)
GLUCOSE BLDC GLUCOMTR-MCNC: 124 MG/DL — HIGH (ref 70–99)
GLUCOSE BLDC GLUCOMTR-MCNC: 124 MG/DL — HIGH (ref 70–99)
GLUCOSE BLDC GLUCOMTR-MCNC: 132 MG/DL — HIGH (ref 70–99)
GLUCOSE BLDC GLUCOMTR-MCNC: 132 MG/DL — HIGH (ref 70–99)
GLUCOSE BLDC GLUCOMTR-MCNC: 157 MG/DL — HIGH (ref 70–99)
GLUCOSE BLDC GLUCOMTR-MCNC: 157 MG/DL — HIGH (ref 70–99)
GLUCOSE BLDC GLUCOMTR-MCNC: 96 MG/DL — SIGNIFICANT CHANGE UP (ref 70–99)
GLUCOSE BLDC GLUCOMTR-MCNC: 96 MG/DL — SIGNIFICANT CHANGE UP (ref 70–99)

## 2023-12-11 PROCEDURE — 99233 SBSQ HOSP IP/OBS HIGH 50: CPT | Mod: FS

## 2023-12-11 RX ORDER — ESCITALOPRAM OXALATE 10 MG/1
1 TABLET, FILM COATED ORAL
Refills: 0 | DISCHARGE

## 2023-12-11 RX ORDER — QUETIAPINE FUMARATE 200 MG/1
0.5 TABLET, FILM COATED ORAL
Refills: 0 | DISCHARGE

## 2023-12-11 RX ORDER — LANOLIN ALCOHOL/MO/W.PET/CERES
6 CREAM (GRAM) TOPICAL
Refills: 0 | DISCHARGE

## 2023-12-11 RX ORDER — QUETIAPINE FUMARATE 200 MG/1
0.5 TABLET, FILM COATED ORAL
Qty: 30 | Refills: 0
Start: 2023-12-11

## 2023-12-11 RX ORDER — ESCITALOPRAM OXALATE 10 MG/1
1 TABLET, FILM COATED ORAL
Qty: 30 | Refills: 0
Start: 2023-12-11

## 2023-12-11 RX ORDER — INSULIN DETEMIR 100/ML (3)
18 INSULIN PEN (ML) SUBCUTANEOUS
Qty: 4 | Refills: 0
Start: 2023-12-11 | End: 2024-01-09

## 2023-12-11 RX ORDER — INSULIN HUMAN 100 [IU]/ML
34 INJECTION, SOLUTION SUBCUTANEOUS
Qty: 0 | Refills: 0 | DISCHARGE
Start: 2023-12-11

## 2023-12-11 RX ORDER — AMLODIPINE BESYLATE 2.5 MG/1
1 TABLET ORAL
Qty: 30 | Refills: 0
Start: 2023-12-11 | End: 2024-01-09

## 2023-12-11 RX ORDER — GABAPENTIN 400 MG/1
2 CAPSULE ORAL
Refills: 0 | DISCHARGE

## 2023-12-11 RX ORDER — AMLODIPINE BESYLATE 2.5 MG/1
1 TABLET ORAL
Refills: 0 | DISCHARGE

## 2023-12-11 RX ORDER — LEVOTHYROXINE SODIUM 125 MCG
1 TABLET ORAL
Qty: 30 | Refills: 0
Start: 2023-12-11

## 2023-12-11 RX ORDER — LANOLIN ALCOHOL/MO/W.PET/CERES
6 CREAM (GRAM) TOPICAL
Qty: 180 | Refills: 0
Start: 2023-12-11 | End: 2024-01-09

## 2023-12-11 RX ORDER — INSULIN LISPRO 100/ML
0 VIAL (ML) SUBCUTANEOUS
Qty: 0 | Refills: 0 | DISCHARGE
Start: 2023-12-11

## 2023-12-11 RX ORDER — GABAPENTIN 400 MG/1
2 CAPSULE ORAL
Qty: 30 | Refills: 0
Start: 2023-12-11

## 2023-12-11 RX ORDER — PANTOPRAZOLE SODIUM 20 MG/1
1 TABLET, DELAYED RELEASE ORAL
Qty: 30 | Refills: 0
Start: 2023-12-11

## 2023-12-11 RX ORDER — FENTANYL CITRATE 50 UG/ML
1 INJECTION INTRAVENOUS
Qty: 0 | Refills: 0 | DISCHARGE
Start: 2023-12-11

## 2023-12-11 RX ORDER — IPRATROPIUM/ALBUTEROL SULFATE 18-103MCG
3 AEROSOL WITH ADAPTER (GRAM) INHALATION
Qty: 360 | Refills: 0
Start: 2023-12-11 | End: 2024-01-09

## 2023-12-11 RX ORDER — INSULIN DETEMIR 100/ML (3)
14 INSULIN PEN (ML) SUBCUTANEOUS
Refills: 0 | DISCHARGE

## 2023-12-11 RX ORDER — FENTANYL CITRATE 50 UG/ML
1 INJECTION INTRAVENOUS
Refills: 0 | DISCHARGE

## 2023-12-11 RX ORDER — INSULIN HUMAN 100 [IU]/ML
17 INJECTION, SOLUTION SUBCUTANEOUS
Qty: 0 | Refills: 0 | DISCHARGE
Start: 2023-12-11

## 2023-12-11 RX ORDER — OXYCODONE HYDROCHLORIDE 5 MG/1
1 TABLET ORAL
Refills: 0 | DISCHARGE

## 2023-12-11 RX ADMIN — Medication 500 MILLIGRAM(S): at 06:21

## 2023-12-11 RX ADMIN — SIMETHICONE 80 MILLIGRAM(S): 80 TABLET, CHEWABLE ORAL at 12:20

## 2023-12-11 RX ADMIN — Medication 1 APPLICATION(S): at 12:27

## 2023-12-11 RX ADMIN — LIDOCAINE 1 PATCH: 4 CREAM TOPICAL at 12:23

## 2023-12-11 RX ADMIN — Medication 2 DROP(S): at 17:21

## 2023-12-11 RX ADMIN — CHLORHEXIDINE GLUCONATE 15 MILLILITER(S): 213 SOLUTION TOPICAL at 06:19

## 2023-12-11 RX ADMIN — Medication 3 MILLILITER(S): at 17:43

## 2023-12-11 RX ADMIN — ZINC OXIDE 1 APPLICATION(S): 200 OINTMENT TOPICAL at 12:23

## 2023-12-11 RX ADMIN — Medication 1000 MILLIGRAM(S): at 13:30

## 2023-12-11 RX ADMIN — INSULIN HUMAN 11 UNIT(S): 100 INJECTION, SOLUTION SUBCUTANEOUS at 12:19

## 2023-12-11 RX ADMIN — INSULIN HUMAN 11 UNIT(S): 100 INJECTION, SOLUTION SUBCUTANEOUS at 17:46

## 2023-12-11 RX ADMIN — SODIUM CHLORIDE 4 MILLILITER(S): 9 INJECTION INTRAMUSCULAR; INTRAVENOUS; SUBCUTANEOUS at 17:43

## 2023-12-11 RX ADMIN — Medication 3 MILLILITER(S): at 05:40

## 2023-12-11 RX ADMIN — LIDOCAINE 1 PATCH: 4 CREAM TOPICAL at 01:40

## 2023-12-11 RX ADMIN — Medication 3 MILLILITER(S): at 11:16

## 2023-12-11 RX ADMIN — GABAPENTIN 100 MILLIGRAM(S): 400 CAPSULE ORAL at 21:12

## 2023-12-11 RX ADMIN — Medication 15 MILLILITER(S): at 12:20

## 2023-12-11 RX ADMIN — Medication 125 MICROGRAM(S): at 04:53

## 2023-12-11 RX ADMIN — Medication 2 DROP(S): at 06:23

## 2023-12-11 RX ADMIN — Medication 5 MILLIGRAM(S): at 06:19

## 2023-12-11 RX ADMIN — Medication 5 MILLIGRAM(S): at 21:13

## 2023-12-11 RX ADMIN — NYSTATIN CREAM 1 APPLICATION(S): 100000 CREAM TOPICAL at 06:24

## 2023-12-11 RX ADMIN — INSULIN GLARGINE 18 UNIT(S): 100 INJECTION, SOLUTION SUBCUTANEOUS at 08:22

## 2023-12-11 RX ADMIN — Medication 1 APPLICATION(S): at 17:21

## 2023-12-11 RX ADMIN — NYSTATIN CREAM 1 APPLICATION(S): 100000 CREAM TOPICAL at 17:18

## 2023-12-11 RX ADMIN — Medication 200 MILLIGRAM(S): at 05:01

## 2023-12-11 RX ADMIN — SIMETHICONE 80 MILLIGRAM(S): 80 TABLET, CHEWABLE ORAL at 21:13

## 2023-12-11 RX ADMIN — LIDOCAINE 1 PATCH: 4 CREAM TOPICAL at 23:51

## 2023-12-11 RX ADMIN — AMLODIPINE BESYLATE 10 MILLIGRAM(S): 2.5 TABLET ORAL at 06:18

## 2023-12-11 RX ADMIN — CHLORHEXIDINE GLUCONATE 1 APPLICATION(S): 213 SOLUTION TOPICAL at 06:24

## 2023-12-11 RX ADMIN — NYSTATIN CREAM 1 APPLICATION(S): 100000 CREAM TOPICAL at 21:14

## 2023-12-11 RX ADMIN — Medication 1 APPLICATION(S): at 06:20

## 2023-12-11 RX ADMIN — PHENYLEPHRINE-SHARK LIVER OIL-MINERAL OIL-PETROLATUM RECTAL OINTMENT 1 APPLICATION(S): at 12:27

## 2023-12-11 RX ADMIN — Medication 3 MILLILITER(S): at 23:16

## 2023-12-11 RX ADMIN — SIMETHICONE 80 MILLIGRAM(S): 80 TABLET, CHEWABLE ORAL at 17:21

## 2023-12-11 RX ADMIN — ESCITALOPRAM OXALATE 10 MILLIGRAM(S): 10 TABLET, FILM COATED ORAL at 08:22

## 2023-12-11 RX ADMIN — CHLORHEXIDINE GLUCONATE 15 MILLILITER(S): 213 SOLUTION TOPICAL at 17:20

## 2023-12-11 RX ADMIN — SIMETHICONE 80 MILLIGRAM(S): 80 TABLET, CHEWABLE ORAL at 06:17

## 2023-12-11 RX ADMIN — Medication 1000 MILLIGRAM(S): at 12:42

## 2023-12-11 RX ADMIN — Medication 1 TABLET(S): at 06:17

## 2023-12-11 RX ADMIN — PANTOPRAZOLE SODIUM 40 MILLIGRAM(S): 20 TABLET, DELAYED RELEASE ORAL at 08:22

## 2023-12-11 RX ADMIN — LIDOCAINE 1 PATCH: 4 CREAM TOPICAL at 19:50

## 2023-12-11 RX ADMIN — INSULIN HUMAN 24 UNIT(S): 100 INJECTION, SOLUTION SUBCUTANEOUS at 06:24

## 2023-12-11 RX ADMIN — Medication 1 APPLICATION(S): at 21:13

## 2023-12-11 RX ADMIN — Medication 2 DROP(S): at 21:14

## 2023-12-11 RX ADMIN — Medication 1 TABLET(S): at 17:20

## 2023-12-11 RX ADMIN — SODIUM CHLORIDE 4 MILLILITER(S): 9 INJECTION INTRAMUSCULAR; INTRAVENOUS; SUBCUTANEOUS at 05:40

## 2023-12-11 RX ADMIN — QUETIAPINE FUMARATE 12.5 MILLIGRAM(S): 200 TABLET, FILM COATED ORAL at 17:20

## 2023-12-11 RX ADMIN — Medication 2 DROP(S): at 12:22

## 2023-12-11 RX ADMIN — Medication 1: at 12:19

## 2023-12-11 RX ADMIN — Medication 500 MILLIGRAM(S): at 12:26

## 2023-12-11 RX ADMIN — Medication 1 MILLIGRAM(S): at 21:14

## 2023-12-11 NOTE — CHART NOTE - NSCHARTNOTEFT_GEN_A_CORE
74 y/o F w/h/o T2DM with unknown control due to anemia of chronic disease skewing A1C levels. On Levemir and Humalog insulin PTA. Unknown DM complications. Also h/o HTN, HLD, anemia, hypothyroidism, RA, fibromyalgia, remote cerebral aneurysm repair, acute hypercapnic respiratory failure now with tracheostomy, PEG tube, and bedbound (trach change 8/18, PEG change 8/14), sacral pressure ulcer, BIBEMS from home for 6 day hx of bleeding from the tracheostomy and PEG tube with associated abdominal pain> now resolved. Endocrinology consulted for hyperglycemia. Reviewed chart> Tolerating TFs form 6am to 2am with BG levels at goal 100 to low 200s due to age and comorbidities and AMS. No hypoglycemia. Will continue with present insulin doses for now.    POC  POCT Blood Glucose.: 102 mg/dL (12-11-23 @ 17:33)  POCT Blood Glucose.: 157 mg/dL (12-11-23 @ 11:51)  POCT Blood Glucose.: 124 mg/dL (12-11-23 @ 08:06)  POCT Blood Glucose.: 132 mg/dL (12-11-23 @ 06:07)  POCT Blood Glucose.: 128 mg/dL (12-10-23 @ 23:31)  POCT Blood Glucose.: 103 mg/dL (12-10-23 @ 19:24)  POCT Blood Glucose.: 133 mg/dL (12-10-23 @ 18:02)  POCT Blood Glucose.: 203 mg/dL (12-10-23 @ 13:14)  POCT Blood Glucose.: 165 mg/dL (12-10-23 @ 09:56)  POCT Blood Glucose.: 143 mg/dL (12-10-23 @ 06:06)  POCT Blood Glucose.: 182 mg/dL (12-10-23 @ 00:04)     MEDICATIONS    insulin glargine Injectable (LANTUS) 18 Unit(s) SubCutaneous every morning  insulin lispro (ADMELOG) corrective regimen sliding scale   SubCutaneous every 6 hours  insulin regular  human recombinant 11 Unit(s) SubCutaneous <User Schedule>  insulin regular  human recombinant 24 Unit(s) SubCutaneous <User Schedule>  levothyroxine 125 MICROGram(s) Oral <User Schedule>  predniSONE   Tablet 5 milliGRAM(s) Oral daily    Diet, NPO with Tube Feed:   Tube Feeding Modality: Gastrostomy  [employer] glucose support 1.2  Total Volume for 24 Hours (mL): 1200  Continuous  Starting Tube Feed Rate {mL per Hour}: 60  Increase Tube Feed Rate by (mL): 0  Until Goal Tube Feed Rate (mL per Hour): 60  Tube Feed Duration (in Hours): 20  Tube Feed Start Time: 06:00  Tube Feed Stop Time: 02:00  Free Water Flush  Pump   Rate (mL per Hour): 10   Frequency: Every 2 Hours  Alfred(7 Gm Arginine/7 Gm Glut/1.2 Gm HMB     Qty per Day:  2 (11-29-23 @ 14:12) [Active]      PLAN:   Problem/Plan - 1:  ·  Problem: Type 2 diabetes mellitus with hyperglycemia, with long-term current use of insulin.   ·  Plan: Test BG q6h while on TFs/NPO  -C/w lantus 18 units daily in am  -C/w Regular insulin to 24 at 6am, 11 units at 12 noon and 6pm. PLEASE DO NOT HOLD. CAN GIVE LOWER DOSE IF CONCERN FOR HYPOGLYCEMIA.   -C/w moderate admelog correction scale every 6 hours  -Will follow and adjust insulin as needed  DISCHARGE:  -Will be determined according to BG/insulin needs/TFs and steroid therapy at time of discharge.  -Will discuss with family final insulin recs: Likely Lantus plus regular insulin x3day  -Needs telemedicine as out pt due to bedbound status. Can follow at [facility]. [address] [place] [address] [address]. Phone . Will send email closer to discharge  Make sure pt has Rx for all DM supplies and insulin.     Problem/Plan - 2:  ·  Problem: Hypothyroidism.   ·  Plan: -c/w levothyroxine 125mcg daily   TSH and free thyroxine wnl  Please make sure LT4 is given on an empty stomach at least one hour apart from other meds and food to ensure med absorption. DO NOT GIVE WITH VITAMINS/ANTACIDS  Pt getting LT4 at 5am and PPI at 8am.  Follow up levels as out pt     Problem/Plan - 3:  ·  Problem: Secondary adrenal insufficiency.   ·  Plan: Due to chronic steroid use pt is at risk of tertiary AI  -c/w prednisone 5mg daily  -BP stable would not increase dose of prednisone at this time.     Problem/Plan - 4:  ·  Problem: HTN (hypertension).   ·  Plan: BP goal <130/80  Manage per primary team.     Problem/Plan - 5:  ·  Problem: Other hyperlipidemia.   ·  Plan: LDL goal <70 due to DM  Pt LDL NA  Order fasting lipid if not recently done  Consider moderate intensity Statin if not contraindicated and based on risks/benefits for pt  Manage per primary team   F/u levels as out pt. 72 y/o F w/h/o T2DM with unknown control due to anemia of chronic disease skewing A1C levels. On Levemir and Humalog insulin PTA. Unknown DM complications. Also h/o HTN, HLD, anemia, hypothyroidism, RA, fibromyalgia, remote cerebral aneurysm repair, acute hypercapnic respiratory failure now with tracheostomy, PEG tube, and bedbound (trach change 8/18, PEG change 8/14), sacral pressure ulcer, BIBEMS from home for 6 day hx of bleeding from the tracheostomy and PEG tube with associated abdominal pain> now resolved. Endocrinology consulted for hyperglycemia. Reviewed chart> Tolerating TFs form 6am to 2am with BG levels at goal 100 to low 200s due to age and comorbidities and AMS. No hypoglycemia. Will continue with present insulin doses for now.    POC  POCT Blood Glucose.: 102 mg/dL (12-11-23 @ 17:33)  POCT Blood Glucose.: 157 mg/dL (12-11-23 @ 11:51)  POCT Blood Glucose.: 124 mg/dL (12-11-23 @ 08:06)  POCT Blood Glucose.: 132 mg/dL (12-11-23 @ 06:07)  POCT Blood Glucose.: 128 mg/dL (12-10-23 @ 23:31)  POCT Blood Glucose.: 103 mg/dL (12-10-23 @ 19:24)  POCT Blood Glucose.: 133 mg/dL (12-10-23 @ 18:02)  POCT Blood Glucose.: 203 mg/dL (12-10-23 @ 13:14)  POCT Blood Glucose.: 165 mg/dL (12-10-23 @ 09:56)  POCT Blood Glucose.: 143 mg/dL (12-10-23 @ 06:06)  POCT Blood Glucose.: 182 mg/dL (12-10-23 @ 00:04)     MEDICATIONS    insulin glargine Injectable (LANTUS) 18 Unit(s) SubCutaneous every morning  insulin lispro (ADMELOG) corrective regimen sliding scale   SubCutaneous every 6 hours  insulin regular  human recombinant 11 Unit(s) SubCutaneous <User Schedule>  insulin regular  human recombinant 24 Unit(s) SubCutaneous <User Schedule>  levothyroxine 125 MICROGram(s) Oral <User Schedule>  predniSONE   Tablet 5 milliGRAM(s) Oral daily    Diet, NPO with Tube Feed:   Tube Feeding Modality: Gastrostomy  [employer] glucose support 1.2  Total Volume for 24 Hours (mL): 1200  Continuous  Starting Tube Feed Rate {mL per Hour}: 60  Increase Tube Feed Rate by (mL): 0  Until Goal Tube Feed Rate (mL per Hour): 60  Tube Feed Duration (in Hours): 20  Tube Feed Start Time: 06:00  Tube Feed Stop Time: 02:00  Free Water Flush  Pump   Rate (mL per Hour): 10   Frequency: Every 2 Hours  Alfred(7 Gm Arginine/7 Gm Glut/1.2 Gm HMB     Qty per Day:  2 (11-29-23 @ 14:12) [Active]      PLAN:   Problem/Plan - 1:  ·  Problem: Type 2 diabetes mellitus with hyperglycemia, with long-term current use of insulin.   ·  Plan: Test BG q6h while on TFs/NPO  -C/w lantus 18 units daily in am  -C/w Regular insulin to 24 at 6am, 11 units at 12 noon and 6pm. PLEASE DO NOT HOLD. CAN GIVE LOWER DOSE IF CONCERN FOR HYPOGLYCEMIA.   -C/w moderate admelog correction scale every 6 hours  -Will follow and adjust insulin as needed  DISCHARGE:  -Will be determined according to BG/insulin needs/TFs and steroid therapy at time of discharge.  -Will discuss with family final insulin recs: Likely Lantus plus regular insulin x3day  -Needs telemedicine as out pt due to bedbound status. Can follow at [facility] [place]. [address] [place] [address] [address]. Phone . Will send email closer to discharge  Make sure pt has Rx for all DM supplies and insulin.     Problem/Plan - 2:  ·  Problem: Hypothyroidism.   ·  Plan: -c/w levothyroxine 125mcg daily   TSH and free thyroxine wnl  Please make sure LT4 is given on an empty stomach at least one hour apart from other meds and food to ensure med absorption. DO NOT GIVE WITH VITAMINS/ANTACIDS  Pt getting LT4 at 5am and PPI at 8am.  Follow up levels as out pt     Problem/Plan - 3:  ·  Problem: Secondary adrenal insufficiency.   ·  Plan: Due to chronic steroid use pt is at risk of tertiary AI  -c/w prednisone 5mg daily  -BP stable would not increase dose of prednisone at this time.     Problem/Plan - 4:  ·  Problem: HTN (hypertension).   ·  Plan: BP goal <130/80  Manage per primary team.     Problem/Plan - 5:  ·  Problem: Other hyperlipidemia.   ·  Plan: LDL goal <70 due to DM  Pt LDL NA  Order fasting lipid if not recently done  Consider moderate intensity Statin if not contraindicated and based on risks/benefits for pt  Manage per primary team   F/u levels as out pt.

## 2023-12-11 NOTE — PROGRESS NOTE ADULT - PROBLEM SELECTOR PLAN 4
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   - PEG Site appears macerated --> Multifactorial, possible the PEG has been too tight at home  - Peg loosened by GI at beside, no leakage or further intervention required   - recurrent RLQ abdominal pain and bleeding at the PEG site  - reevaluated by GI -- no bleeding at the PEG site  - 12/1: s/p abdominal ultrasound - limited study   - (12/3): Pt is c/o abd pain, and daughter is concerned   - AM amylase and Lipase all wnl , CT A/P 12/3 - no acute findings  - Continue Simethicone

## 2023-12-11 NOTE — PROGRESS NOTE ADULT - PROBLEM SELECTOR PLAN 3
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  - Tolerates intermittent PSV 15/5 during day/Vent HS  - Airway Clearance: Duoneb, Hypersal, Chest PT, PRN suctioning   - Maintain 02 sat > 92%    Tracheal Bleeding (Intermittent)-->resolved since admission   - S/p CT Angio neck: No evidence of active bleeding around tracheostomy site. No hematoma.  No collection   - Seen by ENT; Kath and b/l bronchi visualized without any trauma, small amount of blood tinged secretions resting at beginning of right bronchus not actively bleeding.

## 2023-12-11 NOTE — PROGRESS NOTE ADULT - ATTENDING COMMENTS
72 y/o F with PMHx of T2DM, HTN, HLD, anemia, hypothyroidism, RA, fibromyalgia, remote cerebral aneurysm repair, acute hypercapnic respiratory failure now with tracheostomy, PEG tube, and bedbound (trach change 8/18, PEG change 8/14), sacral pressure ulcer, BIBEMS from home for 6 day hx of bleeding from the tracheostomy and PEG tube with associated abdominal pain, recent history of auditory and visual hallucinations, and with chronic sacral decubitus ulcer. Improved on antibiotics for osteomyelitis and staph/pseud  in sputum.  She is hd stable, afebrile, on vent (chronic) There are no acute issues today.  Potential discharge early this week.  AGree with plan as outlined above. 73F with a h/o DM, HTN, HLD, anemia, hypothyroidism, RA, fibromyalgia, remote cerebral aneurysm with repair, hypercapnic respiratory failure s/p tracheostomy/PEG, bedbound, sacral pressure ulcer. Admitted w/ bleeding from tracheostomy and PEG w/ associated abdominal pain, recent hx of auditory/visual hallucinations.   Imaging showed mild thickening along PEG tube tract and sacral ulcer extending to coccyx suggestive of acute osteomyelitis.      # Osteomyelitis  # Chronic hypoxemic RF  # oropharyngeal dysphagia  # acute on chronic pain  # Trach/Peg bleeding  # Tracheitis with Pseudomonas and serratia  # Hx of hallucination, anxiety     #Neuro - awake, alert, and interactive. moving all extremities, mouthing words  Continue current medications  - Behavioral Health Follow-up as needed   #Cardiovascular - hemodynamically stable  #Pulmonary - chronic hypoxemic respiratory failure, has 8XLT trach, on home vent, c/w PSV as able though still requiring 15/5  #Gastro - oropharyngeal dysphagia with PEG tube in place, tolerating feeds, nutrition follow up appreciated   Abdominal sono 12/1 and CT abdomen 12/4- No acute intra-abdominal findings.   #Infectious Disease - sacral osteo on MRI - wound care follow-up appreciated, c/w offloading and local wound care  -The patient has had this wound since a hospitalization in December 2022 and per daughter does not have chronic or multiple wounds but only this single wound.   - C/w 6 week course of Cipro and Keflex as per ID - (to complete approx 12/10).   - Per daughter, patient has had prior multiple infections during hospitalizations including prior pseudomonas, MRSA, E faecalis, and Klebsiella  - Sputum colonized with MDR Pseudomonas   - Daughter reports a recent dental abscess and being told by outpatient dentist that patient needed teeth extracted - Dental evaluation noted with no plans for intervention as inpatient.   #Hem/Onc - prior cytopenias in setting of antibiotics per patient's daughter   - Hem/Onc follow-up noted - suspect an anemia of chronic disease  #Pain - continue current pain control - in setting of fibromyalgia and pain from wound  - Voltaren topical, lidocaine patches and low dose Oxycodone as needed  #Derm - previously seen by derm for skin lesions - mid back with irritated seborrheic keratosis - outpatient follow-up  #Endo - DM2 - continue insulin and adjust as needed for goal FS < 180  - Endocrine follow-up appreciate   - Continue Synthroid - TSH WNL  #DVT proph - SCDs  - Prior prophylactic AC stopped after concern for prior bleeding    Dispo - full code. Medically stable for discharge.

## 2023-12-11 NOTE — PROGRESS NOTE ADULT - PROBLEM SELECTOR PLAN 2
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   - S/p CT abd/pelvis (10/28): Sacral decubitus ulcer extending to the coccyx with osseous remodeling and pelvic MRI or bone scan to recc to exclude osteomyelitis.  - 10/30 wound care note: Sacral stage 4 pressure injury 4.5cm x 2.5cm x 1.5cm w/ lip of undermining circumferentially greatest 9-12 of 3cm.  - MRI pelvis 10/30 with Osteomyelitis, completed Cefepime for 7 days, Vancomycin d/marli   - 11/10: resumed Cipro 500mg q 12 and Keflex 500mg q 6 until 12/10.  - 11/20: Changed to IV Cipro 400 q12 as recommended by ID pharmacist due to multiple drug interactions when given via PEG  - 11/28 wound care note: Sacral stage 4pressure injury 4.5cm x 2.5cm x 0.5cm, moist granular wound bed   palpable but not exposed bone no blistering, (+) serosanguinous drainage. No odor, erythema, increased warmth, tenderness, induration, fluctuance, nor crepitus.  - 12/3 wound culture ordered - d/c'd as no need for culture, wound improving, no signs of infection  - wound care following 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   - S/p CT abd/pelvis (10/28): Sacral decubitus ulcer extending to the coccyx with osseous remodeling and pelvic MRI or bone scan to recc to exclude osteomyelitis.  - 10/30 wound care note: Sacral stage 4 pressure injury 4.5cm x 2.5cm x 1.5cm w/ lip of undermining circumferentially greatest 9-12 of 3cm.  - MRI pelvis 10/30 with Osteomyelitis, completed Cefepime for 7 days, Vancomycin d/marli   - 11/10: resumed Cipro 500mg q 12 and Keflex 500mg q 6 until 12/10.  - 11/20: Changed to IV Cipro 400 q12 as recommended by ID pharmacist due to multiple drug interactions when given via PEG  - 11/28 wound care note: Sacral stage 4pressure injury 4.5cm x 2.5cm x 0.5cm, moist granular wound bed   palpable but not exposed bone no blistering, (+) serosanguinous drainage. No odor, erythema, increased warmth, tenderness, induration, fluctuance, nor crepitus.  - 12/3 wound culture ordered - d/c'd as no need for culture, wound improving, no signs of infection  - 12/10 completed cipro and keflex x5week course.  - wound care following

## 2023-12-11 NOTE — PROGRESS NOTE ADULT - PROBLEM SELECTOR PLAN 7
- s/p Home Levemir 14 units in morning held on admission as noted w/ hypoglycemia   - Enteral feed changed to [employer] diabetic formula; glucose numbers stabilizing  - adjustments made throughout admission per endocrine recs - s/p Home Levemir 14 units in morning held on admission as noted w/ hypoglycemia   - Enteral feed changed to [employer] diabetic formula; glucose numbers stabilizing  - adjustments made throughout admission per endocrine recs

## 2023-12-11 NOTE — PROGRESS NOTE ADULT - ASSESSMENT
74 y/o F with PMHx of T2DM, HTN, HLD, anemia, hypothyroidism, RA, fibromyalgia, remote cerebral aneurysm repair, acute hypercapnic respiratory failure now with tracheostomy, PEG tube, and bedbound (trach change 8/18, PEG change 8/14), sacral pressure ulcer, BIBEMS from home for 6 day hx of bleeding from the tracheostomy and PEG tube with associated abdominal pain, recent history of auditory and visual hallucinations, and with chronic sacral decubitus ulcer. Exam notable for mild abdominal distention with LLQ and RLQ tenderness, tracheostomy in place with no obvious bleeding, PEG tube with scant amount of dried blood, at baseline neurologic status. Imaging showing no active bleeding around tracheostomy site, however was notable for mild thickening along PEG tube tract, sacral ulcer extending to the coccyx suggestive of possible osteomyelitis, and bibasilar patchy lung opacities with volume loss. Patient admitted for respiratory support, wound care of tracheostomy and PEG, and management of sacral osteomyelitis. No further Trach and peg site bleeding noted since admission.  Pelvic MRI imaging also suggestive of Acute OM. Patient placed on long-term antibiotics with Infectious disease guidance.  Additionally treated with a 7d course of Cefepime due to PSA and Serratia tracheitis on 11/8-->11/15 and then resumed cipro/keflex.  Hospital course c/b Delirium for which Seroquel was added and home Lexapro continued. Tracheostomy changed to #9 Cuffed [place] by ENT 11/3.  Despite trach change patient remained with persistent air leak.  On 11/19, the trach was again changed due to leak and loss of volumes on vent.  Trach was changed to #8 distal cuffed Shiley.  Patient seen by Dermatology for back lesions.  One diagnosed as a seborrheic keratitis and the other a dermatofibroma.  Intermittent abdominal pain throughout hospital course, at times relieved with gas/BM, w/u negative, seen by GI - no recs.  Remains medically stable while pending discharge home.      12/10:  Stable throughout the day.  Cipro and Keflex to be completed today.  72 y/o F with PMHx of T2DM, HTN, HLD, anemia, hypothyroidism, RA, fibromyalgia, remote cerebral aneurysm repair, acute hypercapnic respiratory failure now with tracheostomy, PEG tube, and bedbound (trach change 8/18, PEG change 8/14), sacral pressure ulcer, BIBEMS from home for 6 day hx of bleeding from the tracheostomy and PEG tube with associated abdominal pain, recent history of auditory and visual hallucinations, and with chronic sacral decubitus ulcer. Exam notable for mild abdominal distention with LLQ and RLQ tenderness, tracheostomy in place with no obvious bleeding, PEG tube with scant amount of dried blood, at baseline neurologic status. Imaging showing no active bleeding around tracheostomy site, however was notable for mild thickening along PEG tube tract, sacral ulcer extending to the coccyx suggestive of possible osteomyelitis, and bibasilar patchy lung opacities with volume loss. Patient admitted for respiratory support, wound care of tracheostomy and PEG, and management of sacral osteomyelitis. No further Trach and peg site bleeding noted since admission.  Pelvic MRI imaging also suggestive of Acute OM. Patient placed on long-term antibiotics with Infectious disease guidance.  Additionally treated with a 7d course of Cefepime due to PSA and Serratia tracheitis on 11/8-->11/15 and then resumed cipro/keflex.  Hospital course c/b Delirium for which Seroquel was added and home Lexapro continued. Tracheostomy changed to #9 Cuffed [place] by ENT 11/3.  Despite trach change patient remained with persistent air leak.  On 11/19, the trach was again changed due to leak and loss of volumes on vent.  Trach was changed to #8 distal cuffed [place]iley.  Patient seen by Dermatology for back lesions.  One diagnosed as a seborrheic keratitis and the other a dermatofibroma.  Intermittent abdominal pain throughout hospital course, at times relieved with gas/BM, w/u negative, seen by GI - no recs.  Remains medically stable while pending discharge home.      12/10:  Stable throughout the day.  Cipro and Keflex to be completed today.  72 y/o F with PMHx of T2DM, HTN, HLD, anemia, hypothyroidism, RA, fibromyalgia, remote cerebral aneurysm repair, acute hypercapnic respiratory failure now with tracheostomy, PEG tube, and bedbound (trach change 8/18, PEG change 8/14), sacral pressure ulcer, BIBEMS from home for 6 day hx of bleeding from the tracheostomy and PEG tube with associated abdominal pain, recent history of auditory and visual hallucinations, and with chronic sacral decubitus ulcer. Exam notable for mild abdominal distention with LLQ and RLQ tenderness, tracheostomy in place with no obvious bleeding, PEG tube with scant amount of dried blood, at baseline neurologic status. Imaging showing no active bleeding around tracheostomy site, however was notable for mild thickening along PEG tube tract, sacral ulcer extending to the coccyx suggestive of possible osteomyelitis, and bibasilar patchy lung opacities with volume loss. Patient admitted for respiratory support, wound care of tracheostomy and PEG, and management of sacral osteomyelitis. No further Trach and peg site bleeding noted since admission.  Pelvic MRI imaging also suggestive of Acute OM. Patient placed on long-term antibiotics with Infectious disease guidance.  Additionally treated with a 7d course of Cefepime due to PSA and Serratia tracheitis on 11/8-->11/15 and then resumed cipro/keflex.  Hospital course c/b Delirium for which Seroquel was added and home Lexapro continued. Tracheostomy changed to #9 Cuffed [place] by ENT 11/3.  Despite trach change patient remained with persistent air leak.  On 11/19, the trach was again changed due to leak and loss of volumes on vent.  Trach was changed to #8 distal cuffed [place].  Patient seen by Dermatology for back lesions.  One diagnosed as a seborrheic keratitis and the other a dermatofibroma.  Intermittent abdominal pain throughout hospital course, at times relieved with gas/BM, w/u negative, seen by GI - no recs.  Remains medically stable while pending discharge home.      12/11: 74 y/o F with PMHx of T2DM, HTN, HLD, anemia, hypothyroidism, RA, fibromyalgia, remote cerebral aneurysm repair, acute hypercapnic respiratory failure now with tracheostomy, PEG tube, and bedbound (trach change 8/18, PEG change 8/14), sacral pressure ulcer, BIBEMS from home for 6 day hx of bleeding from the tracheostomy and PEG tube with associated abdominal pain, recent history of auditory and visual hallucinations, and with chronic sacral decubitus ulcer. Exam notable for mild abdominal distention with LLQ and RLQ tenderness, tracheostomy in place with no obvious bleeding, PEG tube with scant amount of dried blood, at baseline neurologic status. Imaging showing no active bleeding around tracheostomy site, however was notable for mild thickening along PEG tube tract, sacral ulcer extending to the coccyx suggestive of possible osteomyelitis, and bibasilar patchy lung opacities with volume loss. Patient admitted for respiratory support, wound care of tracheostomy and PEG, and management of sacral osteomyelitis. No further Trach and peg site bleeding noted since admission.  Pelvic MRI imaging also suggestive of Acute OM. Patient placed on long-term antibiotics with Infectious disease guidance.  Additionally treated with a 7d course of Cefepime due to PSA and Serratia tracheitis on 11/8-->11/15 and then resumed cipro/keflex.  Hospital course c/b Delirium for which Seroquel was added and home Lexapro continued. Tracheostomy changed to #9 Cuffed [place] by ENT 11/3.  Despite trach change patient remained with persistent air leak.  On 11/19, the trach was again changed due to leak and loss of volumes on vent.  Trach was changed to #8 distal cuffed [place].  Patient seen by Dermatology for back lesions.  One diagnosed as a seborrheic keratitis and the other a dermatofibroma.  Intermittent abdominal pain throughout hospital course, at times relieved with gas/BM, w/u negative, seen by GI - no recs.  Remains medically stable while pending discharge home.      12/11: 72 y/o F with PMHx of T2DM, HTN, HLD, anemia, hypothyroidism, RA, fibromyalgia, remote cerebral aneurysm repair, acute hypercapnic respiratory failure now with tracheostomy, PEG tube, and bedbound (trach change 8/18, PEG change 8/14), sacral pressure ulcer, BIBEMS from home for 6 day hx of bleeding from the tracheostomy and PEG tube with associated abdominal pain, recent history of auditory and visual hallucinations, and with chronic sacral decubitus ulcer. Exam notable for mild abdominal distention with LLQ and RLQ tenderness, tracheostomy in place with no obvious bleeding, PEG tube with scant amount of dried blood, at baseline neurologic status. Imaging showing no active bleeding around tracheostomy site, however was notable for mild thickening along PEG tube tract, sacral ulcer extending to the coccyx suggestive of possible osteomyelitis, and bibasilar patchy lung opacities with volume loss. Patient admitted for respiratory support, wound care of tracheostomy and PEG, and management of sacral osteomyelitis. No further Trach and peg site bleeding noted since admission.  Pelvic MRI imaging also suggestive of Acute OM. Patient placed on long-term antibiotics with Infectious disease guidance.  Additionally treated with a 7d course of Cefepime due to PSA and Serratia tracheitis on 11/8-->11/15 and then resumed cipro/keflex.  Hospital course c/b Delirium for which Seroquel was added and home Lexapro continued. Tracheostomy changed to #9 Cuffed [place] by ENT 11/3.  Despite trach change patient remained with persistent air leak.  On 11/19, the trach was again changed due to leak and loss of volumes on vent.  Trach was changed to #8 distal cuffed [place].  Patient seen by Dermatology for back lesions.  One diagnosed as a seborrheic keratitis and the other a dermatofibroma.  Intermittent abdominal pain throughout hospital course, at times relieved with gas/BM, w/u negative, seen by GI - no recs.  12/10 pt completed cipro and keflex for osteo treatment.  Remains medically stable while pending discharge home.      12/11:  Pt stable throughout the day.  ID to bedside for final recs - completed treatment for osteo 12/10, no need for repeat MRI s/p treatment for osteomyelitis.  Pt pending discharge home.  Daughter involved in plan of care.

## 2023-12-11 NOTE — PROGRESS NOTE ADULT - SUBJECTIVE AND OBJECTIVE BOX
Patient is a 73y old  Female who presents with a chief complaint of bleeding (04 Dec 2023 16:57)      Interval Events:    REVIEW OF SYSTEMS:  [ ] Positive  [ ] All other systems negative  [ ] Unable to assess ROS because ________    Vital Signs Last 24 Hrs  T(C): 36.5 (12-11-23 @ 04:58), Max: 37.4 (12-10-23 @ 23:25)  T(F): 97.7 (12-11-23 @ 04:58), Max: 99.3 (12-10-23 @ 23:25)  HR: 86 (12-11-23 @ 05:52) (66 - 89)  BP: 142/64 (12-11-23 @ 04:58) (106/51 - 142/64)  RR: 13 (12-11-23 @ 04:58) (13 - 18)  SpO2: 99% (12-11-23 @ 05:52) (96% - 100%)    PHYSICAL EXAM:  HEENT:   [ ]Tracheostomy:  [ ]Pupils equal  [ ]No oral lesions  [ ]Abnormal    SKIN  [ ]No Rash  [ ] Abnormal  [ ] pressure    CARDIAC  [ ]Regular  [ ]Abnormal    PULMONARY  [ ]Bilateral Clear Breath Sounds  [ ]Normal Excursion  [ ]Abnormal    GI  [ ]PEG      [ ] +BS		              [ ]Soft, nondistended, nontender	  [ ]Abnormal    MUSCULOSKELETAL                                   [ ]Bedbound                 [ ]Abnormal    [ ]Ambulatory/OOB to chair                           EXTREMITIES                                         [ ]Normal  [ ]Edema                           NEUROLOGIC  [ ] Normal, non focal  [ ] Focal findings:    PSYCHIATRIC  [ ]Alert and appropriate  [ ] Sedated	 [ ]Agitated    :  Mcbride: [ ] Yes, if yes: Date of Placement:                   [  ] No    LINES: Central Lines [ ] Yes, if yes: Date of Placement                                     [  ] No    HOSPITAL MEDICATIONS:  MEDICATIONS  (STANDING):  albuterol/ipratropium for Nebulization 3 milliLiter(s) Nebulizer every 6 hours  amLODIPine   Tablet 10 milliGRAM(s) Oral daily  artificial  tears Solution 2 Drop(s) Both EYES four times a day  ascorbic acid 500 milliGRAM(s) Oral daily  calcium carbonate    500 mG (Tums) Chewable 1 Tablet(s) Chew every 12 hours  cephalexin 500 milliGRAM(s) Oral every 6 hours  chlorhexidine 0.12% Liquid 15 milliLiter(s) Oral Mucosa every 12 hours  chlorhexidine 4% Liquid 1 Application(s) Topical <User Schedule>  ciprofloxacin   IVPB 400 milliGRAM(s) IV Intermittent every 12 hours  dextrose 50% Injectable 25 Gram(s) IV Push once  dextrose 50% Injectable 50 milliLiter(s) IV Push once  dextrose 50% Injectable 12.5 Gram(s) IV Push once  escitalopram 10 milliGRAM(s) Oral <User Schedule>  fentaNYL   Patch  25 MICROgram(s)/Hr 1 Patch Transdermal every 72 hours  gabapentin Solution 100 milliGRAM(s) Enteral Tube at bedtime  glucagon  Injectable 1 milliGRAM(s) IntraMuscular once  hemorrhoidal Ointment      hemorrhoidal Ointment 1 Application(s) Rectal daily  insulin glargine Injectable (LANTUS) 18 Unit(s) SubCutaneous every morning  insulin lispro (ADMELOG) corrective regimen sliding scale   SubCutaneous every 6 hours  insulin regular  human recombinant 24 Unit(s) SubCutaneous <User Schedule>  insulin regular  human recombinant 11 Unit(s) SubCutaneous <User Schedule>  levothyroxine 125 MICROGram(s) Oral <User Schedule>  lidocaine   4% Patch 1 Patch Transdermal daily  melatonin 1 milliGRAM(s) Oral at bedtime  melatonin 5 milliGRAM(s) Oral at bedtime  multivitamin/minerals/iron Oral Solution (CENTRUM) 15 milliLiter(s) Oral daily  nystatin Powder 1 Application(s) Topical every 8 hours  pantoprazole   Suspension 40 milliGRAM(s) Enteral Tube <User Schedule>  petrolatum Ophthalmic Ointment 1 Application(s) Both EYES four times a day  predniSONE   Tablet 5 milliGRAM(s) Oral daily  QUEtiapine 12.5 milliGRAM(s) Oral <User Schedule>  simethicone 80 milliGRAM(s) Chew four times a day  sodium chloride 3%  Inhalation 4 milliLiter(s) Inhalation every 12 hours  zinc oxide 40% Paste 1 Application(s) Topical daily    MEDICATIONS  (PRN):  acetaminophen   Oral Liquid .. 1000 milliGRAM(s) Enteral Tube every 6 hours PRN Temp greater or equal to 38C (100.4F), Mild Pain (1 - 3)  benzocaine 20% Spray 1 Spray(s) Topical four times a day PRN Cough  diclofenac sodium 1% Gel 2 Gram(s) Topical four times a day PRN pain  diphenhydrAMINE Elixir 25 milliGRAM(s) Oral every 6 hours PRN Rash and/or Itching  guaifenesin/dextromethorphan Oral Liquid 10 milliLiter(s) Oral every 6 hours PRN Cough  sodium chloride 0.65% Nasal 1 Spray(s) Both Nostrils every 6 hours PRN Nasal Congestion      LABS:                        9.5    7.89  )-----------( 116      ( 09 Dec 2023 10:47 )             32.0     12-09    132<L>  |  97  |  20  ----------------------------<  211<H>  4.4   |  25  |  <0.30<L>    Ca    9.5      09 Dec 2023 10:47  Phos  4.0     12-09  Mg     1.7     12-09        Urinalysis Basic - ( 09 Dec 2023 10:47 )    Color: x / Appearance: x / SG: x / pH: x  Gluc: 211 mg/dL / Ketone: x  / Bili: x / Urobili: x   Blood: x / Protein: x / Nitrite: x   Leuk Esterase: x / RBC: x / WBC x   Sq Epi: x / Non Sq Epi: x / Bacteria: x          CAPILLARY BLOOD GLUCOSE    MICROBIOLOGY:     RADIOLOGY:  [ ] Reviewed and interpreted by me    Mode: AC/ CMV (Assist Control/ Continuous Mandatory Ventilation)  RR (machine): 12  TV (machine): 300  FiO2: 30  PEEP: 5  ITime: 1  MAP: 8  PIP: 27   Patient is a 73y old  Female who presents with a chief complaint of bleeding (04 Dec 2023 16:57)      Interval Events:  No acute events overnight.     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    CARDIAC  [X]Regular  [ ]Abnormal    PULMONARY  [ ]Bilateral Clear Breath Sounds  [ ]Normal Excursion  [X]Abnormal - BL Coarse BS    GI  [X]PEG      [X] +BS		              [X]Soft, nondistended, nontender	  [ ]Abnormal    MUSCULOSKELETAL                                   [X]Bedbound                 [ ]Abnormal    [ ]Ambulatory/OOB to chair                           EXTREMITIES                                         [X]Normal  [ ]Edema                           NEUROLOGIC  [ ] Normal, non focal  [X] Focal findings: opens eyes spontaneously, follows commands on all 4 extremities, able to mouth words    PSYCHIATRIC  [X]Alert and appropriate  [ ] Sedated	 [ ]Agitated    :  Mcbride: [ ] Yes, if yes: Date of Placement:                   [X] No    LINES: Central Lines [ ] Yes, if yes: Date of Placement                                     [X] No    HOSPITAL MEDICATIONS:  MEDICATIONS  (STANDING):  albuterol/ipratropium for Nebulization 3 milliLiter(s) Nebulizer every 6 hours  amLODIPine   Tablet 10 milliGRAM(s) Oral daily  artificial  tears Solution 2 Drop(s) Both EYES four times a day  ascorbic acid 500 milliGRAM(s) Oral daily  calcium carbonate    500 mG (Tums) Chewable 1 Tablet(s) Chew every 12 hours  cephalexin 500 milliGRAM(s) Oral every 6 hours  chlorhexidine 0.12% Liquid 15 milliLiter(s) Oral Mucosa every 12 hours  chlorhexidine 4% Liquid 1 Application(s) Topical <User Schedule>  ciprofloxacin   IVPB 400 milliGRAM(s) IV Intermittent every 12 hours  dextrose 50% Injectable 25 Gram(s) IV Push once  dextrose 50% Injectable 50 milliLiter(s) IV Push once  dextrose 50% Injectable 12.5 Gram(s) IV Push once  escitalopram 10 milliGRAM(s) Oral <User Schedule>  fentaNYL   Patch  25 MICROgram(s)/Hr 1 Patch Transdermal every 72 hours  gabapentin Solution 100 milliGRAM(s) Enteral Tube at bedtime  glucagon  Injectable 1 milliGRAM(s) IntraMuscular once  hemorrhoidal Ointment      hemorrhoidal Ointment 1 Application(s) Rectal daily  insulin glargine Injectable (LANTUS) 18 Unit(s) SubCutaneous every morning  insulin lispro (ADMELOG) corrective regimen sliding scale   SubCutaneous every 6 hours  insulin regular  human recombinant 24 Unit(s) SubCutaneous <User Schedule>  insulin regular  human recombinant 11 Unit(s) SubCutaneous <User Schedule>  levothyroxine 125 MICROGram(s) Oral <User Schedule>  lidocaine   4% Patch 1 Patch Transdermal daily  melatonin 1 milliGRAM(s) Oral at bedtime  melatonin 5 milliGRAM(s) Oral at bedtime  multivitamin/minerals/iron Oral Solution (CENTRUM) 15 milliLiter(s) Oral daily  nystatin Powder 1 Application(s) Topical every 8 hours  pantoprazole   Suspension 40 milliGRAM(s) Enteral Tube <User Schedule>  petrolatum Ophthalmic Ointment 1 Application(s) Both EYES four times a day  predniSONE   Tablet 5 milliGRAM(s) Oral daily  QUEtiapine 12.5 milliGRAM(s) Oral <User Schedule>  simethicone 80 milliGRAM(s) Chew four times a day  sodium chloride 3%  Inhalation 4 milliLiter(s) Inhalation every 12 hours  zinc oxide 40% Paste 1 Application(s) Topical daily    MEDICATIONS  (PRN):  acetaminophen   Oral Liquid .. 1000 milliGRAM(s) Enteral Tube every 6 hours PRN Temp greater or equal to 38C (100.4F), Mild Pain (1 - 3)  benzocaine 20% Spray 1 Spray(s) Topical four times a day PRN Cough  diclofenac sodium 1% Gel 2 Gram(s) Topical four times a day PRN pain  diphenhydrAMINE Elixir 25 milliGRAM(s) Oral every 6 hours PRN Rash and/or Itching  guaifenesin/dextromethorphan Oral Liquid 10 milliLiter(s) Oral every 6 hours PRN Cough  sodium chloride 0.65% Nasal 1 Spray(s) Both Nostrils every 6 hours PRN Nasal Congestion      LABS:                        9.5    7.89  )-----------( 116      ( 09 Dec 2023 10:47 )             32.0     12-09    132<L>  |  97  |  20  ----------------------------<  211<H>  4.4   |  25  |  <0.30<L>    Ca    9.5      09 Dec 2023 10:47  Phos  4.0     12-09  Mg     1.7     12-09        Urinalysis Basic - ( 09 Dec 2023 10:47 )    Color: x / Appearance: x / SG: x / pH: x  Gluc: 211 mg/dL / Ketone: x  / Bili: x / Urobili: x   Blood: x / Protein: x / Nitrite: x   Leuk Esterase: x / RBC: x / WBC x   Sq Epi: x / Non Sq Epi: x / Bacteria: x          CAPILLARY BLOOD GLUCOSE    MICROBIOLOGY:     RADIOLOGY:  [ ] Reviewed and interpreted by me    Mode: AC/ CMV (Assist Control/ Continuous Mandatory Ventilation)  RR (machine): 12  TV (machine): 300  FiO2: 30  PEEP: 5  ITime: 1  MAP: 8  PIP: 27

## 2023-12-12 LAB
GLUCOSE BLDC GLUCOMTR-MCNC: 127 MG/DL — HIGH (ref 70–99)
GLUCOSE BLDC GLUCOMTR-MCNC: 127 MG/DL — HIGH (ref 70–99)
GLUCOSE BLDC GLUCOMTR-MCNC: 169 MG/DL — HIGH (ref 70–99)
GLUCOSE BLDC GLUCOMTR-MCNC: 169 MG/DL — HIGH (ref 70–99)
GLUCOSE BLDC GLUCOMTR-MCNC: 209 MG/DL — HIGH (ref 70–99)
GLUCOSE BLDC GLUCOMTR-MCNC: 209 MG/DL — HIGH (ref 70–99)

## 2023-12-12 PROCEDURE — 99233 SBSQ HOSP IP/OBS HIGH 50: CPT | Mod: FS

## 2023-12-12 RX ORDER — INSULIN HUMAN 100 [IU]/ML
11 INJECTION, SOLUTION SUBCUTANEOUS
Refills: 0 | Status: DISCONTINUED | OUTPATIENT
Start: 2023-12-12 | End: 2023-12-23

## 2023-12-12 RX ORDER — SODIUM CHLORIDE 9 MG/ML
4 INJECTION INTRAMUSCULAR; INTRAVENOUS; SUBCUTANEOUS
Qty: 1 | Refills: 0
Start: 2023-12-12 | End: 2024-01-10

## 2023-12-12 RX ORDER — ESCITALOPRAM OXALATE 10 MG/1
10 TABLET, FILM COATED ORAL
Refills: 0 | DISCHARGE

## 2023-12-12 RX ORDER — INSULIN HUMAN 100 [IU]/ML
9 INJECTION, SOLUTION SUBCUTANEOUS
Refills: 0 | Status: DISCONTINUED | OUTPATIENT
Start: 2023-12-12 | End: 2024-01-11

## 2023-12-12 RX ORDER — SIMETHICONE 80 MG/1
125 TABLET, CHEWABLE ORAL
Refills: 0 | DISCHARGE

## 2023-12-12 RX ORDER — MINERAL OIL, PETROLATUM, PHENYLEPHRINE HCL 2.5; 140; 749 MG/G; MG/G; MG/G
0 OINTMENT TOPICAL
Qty: 30 | Refills: 1
Start: 2023-12-12

## 2023-12-12 RX ORDER — SIMETHICONE 80 MG/1
1 TABLET, CHEWABLE ORAL
Qty: 120 | Refills: 5
Start: 2023-12-12 | End: 2024-06-08

## 2023-12-12 RX ADMIN — ZINC OXIDE 1 APPLICATION(S): 200 OINTMENT TOPICAL at 11:26

## 2023-12-12 RX ADMIN — Medication 2: at 05:52

## 2023-12-12 RX ADMIN — Medication 3 MILLILITER(S): at 05:23

## 2023-12-12 RX ADMIN — Medication 3 MILLILITER(S): at 23:30

## 2023-12-12 RX ADMIN — Medication 1 TABLET(S): at 17:20

## 2023-12-12 RX ADMIN — LIDOCAINE 1 PATCH: 4 CREAM TOPICAL at 11:25

## 2023-12-12 RX ADMIN — Medication 3 MILLILITER(S): at 17:36

## 2023-12-12 RX ADMIN — GABAPENTIN 100 MILLIGRAM(S): 400 CAPSULE ORAL at 21:09

## 2023-12-12 RX ADMIN — Medication 1000 MILLIGRAM(S): at 05:00

## 2023-12-12 RX ADMIN — SODIUM CHLORIDE 4 MILLILITER(S): 9 INJECTION INTRAMUSCULAR; INTRAVENOUS; SUBCUTANEOUS at 05:24

## 2023-12-12 RX ADMIN — Medication 500 MILLIGRAM(S): at 11:24

## 2023-12-12 RX ADMIN — Medication 5 MILLIGRAM(S): at 04:32

## 2023-12-12 RX ADMIN — Medication 2 DROP(S): at 17:21

## 2023-12-12 RX ADMIN — INSULIN HUMAN 9 UNIT(S): 100 INJECTION, SOLUTION SUBCUTANEOUS at 18:26

## 2023-12-12 RX ADMIN — Medication 1 APPLICATION(S): at 17:21

## 2023-12-12 RX ADMIN — NYSTATIN CREAM 1 APPLICATION(S): 100000 CREAM TOPICAL at 13:03

## 2023-12-12 RX ADMIN — LIDOCAINE 1 PATCH: 4 CREAM TOPICAL at 18:23

## 2023-12-12 RX ADMIN — SIMETHICONE 80 MILLIGRAM(S): 80 TABLET, CHEWABLE ORAL at 04:32

## 2023-12-12 RX ADMIN — Medication 1 TABLET(S): at 04:32

## 2023-12-12 RX ADMIN — Medication 2 DROP(S): at 21:10

## 2023-12-12 RX ADMIN — Medication 15 MILLILITER(S): at 11:24

## 2023-12-12 RX ADMIN — SIMETHICONE 80 MILLIGRAM(S): 80 TABLET, CHEWABLE ORAL at 21:09

## 2023-12-12 RX ADMIN — NYSTATIN CREAM 1 APPLICATION(S): 100000 CREAM TOPICAL at 04:33

## 2023-12-12 RX ADMIN — Medication 1 MILLIGRAM(S): at 21:09

## 2023-12-12 RX ADMIN — CHLORHEXIDINE GLUCONATE 15 MILLILITER(S): 213 SOLUTION TOPICAL at 04:33

## 2023-12-12 RX ADMIN — SODIUM CHLORIDE 4 MILLILITER(S): 9 INJECTION INTRAMUSCULAR; INTRAVENOUS; SUBCUTANEOUS at 17:36

## 2023-12-12 RX ADMIN — Medication 3 MILLILITER(S): at 11:23

## 2023-12-12 RX ADMIN — CHLORHEXIDINE GLUCONATE 1 APPLICATION(S): 213 SOLUTION TOPICAL at 04:34

## 2023-12-12 RX ADMIN — Medication 1000 MILLIGRAM(S): at 04:31

## 2023-12-12 RX ADMIN — QUETIAPINE FUMARATE 12.5 MILLIGRAM(S): 200 TABLET, FILM COATED ORAL at 17:20

## 2023-12-12 RX ADMIN — SIMETHICONE 80 MILLIGRAM(S): 80 TABLET, CHEWABLE ORAL at 11:25

## 2023-12-12 RX ADMIN — Medication 5 MILLIGRAM(S): at 21:09

## 2023-12-12 RX ADMIN — Medication 1: at 13:02

## 2023-12-12 RX ADMIN — INSULIN GLARGINE 18 UNIT(S): 100 INJECTION, SOLUTION SUBCUTANEOUS at 05:51

## 2023-12-12 RX ADMIN — Medication 1 APPLICATION(S): at 21:10

## 2023-12-12 RX ADMIN — SIMETHICONE 80 MILLIGRAM(S): 80 TABLET, CHEWABLE ORAL at 17:20

## 2023-12-12 RX ADMIN — CHLORHEXIDINE GLUCONATE 15 MILLILITER(S): 213 SOLUTION TOPICAL at 17:21

## 2023-12-12 RX ADMIN — INSULIN HUMAN 11 UNIT(S): 100 INJECTION, SOLUTION SUBCUTANEOUS at 13:03

## 2023-12-12 RX ADMIN — ESCITALOPRAM OXALATE 10 MILLIGRAM(S): 10 TABLET, FILM COATED ORAL at 08:46

## 2023-12-12 RX ADMIN — PANTOPRAZOLE SODIUM 40 MILLIGRAM(S): 20 TABLET, DELAYED RELEASE ORAL at 08:46

## 2023-12-12 RX ADMIN — Medication 1 APPLICATION(S): at 11:24

## 2023-12-12 RX ADMIN — NYSTATIN CREAM 1 APPLICATION(S): 100000 CREAM TOPICAL at 21:10

## 2023-12-12 RX ADMIN — Medication 1 APPLICATION(S): at 04:32

## 2023-12-12 RX ADMIN — Medication 1000 MILLIGRAM(S): at 17:16

## 2023-12-12 RX ADMIN — Medication 2 DROP(S): at 11:24

## 2023-12-12 RX ADMIN — Medication 125 MICROGRAM(S): at 04:31

## 2023-12-12 RX ADMIN — INSULIN HUMAN 24 UNIT(S): 100 INJECTION, SOLUTION SUBCUTANEOUS at 05:51

## 2023-12-12 RX ADMIN — Medication 1000 MILLIGRAM(S): at 16:46

## 2023-12-12 RX ADMIN — Medication 2 DROP(S): at 04:32

## 2023-12-12 RX ADMIN — PHENYLEPHRINE-SHARK LIVER OIL-MINERAL OIL-PETROLATUM RECTAL OINTMENT 1 APPLICATION(S): at 11:26

## 2023-12-12 RX ADMIN — AMLODIPINE BESYLATE 10 MILLIGRAM(S): 2.5 TABLET ORAL at 04:32

## 2023-12-12 NOTE — PROGRESS NOTE ADULT - TIME BILLING
review of medical record, medical decision making as above, discussion with interdisciplinary team, review of labs, imaging, physical exam

## 2023-12-12 NOTE — PROGRESS NOTE ADULT - PROBLEM SELECTOR PLAN 7
- s/p Home Levemir 14 units in morning held on admission as noted w/ hypoglycemia   - Enteral feed changed to [employer] diabetic formula; glucose numbers stabilizing  - adjustments made throughout admission per endocrine recs - s/p Home Levemir 14 units in morning held on admission as noted w/ hypoglycemia   - Enteral feed changed to [employer] diabetic formula; glucose numbers stabilizing  - adjustments made throughout admission per endocrine recs

## 2023-12-12 NOTE — CHART NOTE - NSCHARTNOTEFT_GEN_A_CORE
72 y/o F w/h/o T2DM with unknown control due to anemia of chronic disease skewing A1C levels. On Levemir and Humalog insulin PTA. Unknown DM complications. Also h/o HTN, HLD, anemia, hypothyroidism, RA, fibromyalgia, remote cerebral aneurysm repair, acute hypercapnic respiratory failure now with tracheostomy, PEG tube, and bedbound (trach change 8/18, PEG change 8/14), sacral pressure ulcer, BIBEMS from home for 6 day hx of bleeding from the tracheostomy and PEG tube with associated abdominal pain> now resolved. Endocrinology consulted for hyperglycemia. Reviewed chart> Tolerating TFs from 6am to 2am with BG levels mostly at goal (100 to low 200s due to age and comorbidities and AMS). noted 6pm  yesterday and 12mn BG 96  without hypoglycemia. Will preventively decrease regular insulin dose at 6pm and continue rest of insulin doses the same. Per primary team planning discharge later this week.     POC  POCT Blood Glucose.: 169 mg/dL (12-12-23 @ 12:17)  POCT Blood Glucose.: 209 mg/dL (12-12-23 @ 05:36)  POCT Blood Glucose.: 96 mg/dL (12-11-23 @ 23:55)  POCT Blood Glucose.: 102 mg/dL (12-11-23 @ 17:33)  POCT Blood Glucose.: 157 mg/dL (12-11-23 @ 11:51)  POCT Blood Glucose.: 124 mg/dL (12-11-23 @ 08:06)  POCT Blood Glucose.: 132 mg/dL (12-11-23 @ 06:07)  POCT Blood Glucose.: 128 mg/dL (12-10-23 @ 23:31)  POCT Blood Glucose.: 103 mg/dL (12-10-23 @ 19:24)  POCT Blood Glucose.: 133 mg/dL (12-10-23 @ 18:02)       MEDICATIONS    insulin glargine Injectable (LANTUS) 18 Unit(s) SubCutaneous every morning  insulin lispro (ADMELOG) corrective regimen sliding scale   SubCutaneous every 6 hours  insulin regular  human recombinant 11 Unit(s) SubCutaneous <User Schedule>  insulin regular  human recombinant 9 Unit(s) SubCutaneous <User Schedule>  insulin regular  human recombinant 24 Unit(s) SubCutaneous <User Schedule>  levothyroxine 125 MICROGram(s) Oral <User Schedule>  predniSONE   Tablet 5 milliGRAM(s) Oral daily      Diet, NPO with Tube Feed:   Tube Feeding Modality: Gastrostomy  Casie MiiPharos glucose support 1.2  Total Volume for 24 Hours (mL): 1200  Continuous  Starting Tube Feed Rate {mL per Hour}: 60  Increase Tube Feed Rate by (mL): 0  Until Goal Tube Feed Rate (mL per Hour): 60  Tube Feed Duration (in Hours): 20  Tube Feed Start Time: 06:00  Tube Feed Stop Time: 02:00  Free Water Flush  Pump   Rate (mL per Hour): 10   Frequency: Every 2 Hours  Alfred(7 Gm Arginine/7 Gm Glut/1.2 Gm HMB     Qty per Day:  2 (11-29-23 @ 14:12) [Active]      PLAN:   Problem/Plan - 1:  ·  Problem: Type 2 diabetes mellitus with hyperglycemia, with long-term current use of insulin.   ·  Plan: Test BG q6h while on TFs/NPO  -C/w lantus 18 units daily in am  -C/w Regular insulin to 24 at 6am, 11 units at 12 noon and 9 units at 6pm. PLEASE DO NOT HOLD. CAN GIVE LOWER DOSE IF CONCERN FOR HYPOGLYCEMIA.   -C/w moderate admelog correction scale every 6 hours  -Will follow and adjust insulin as needed  DISCHARGE:  -Will be determined according to BG/insulin needs/TFs and steroid therapy at time of discharge. If discharge within next 24 hours will continie with same insulin types and  doses as noted above except correction Admelog scale   -Needs telemedicine as out pt due to bedbound status. Can follow at [facility]. [address] [place] [address] [address]. Phone . Will send email closer to discharge  Make sure pt has Rx for all DM supplies and insulin.     Problem/Plan - 2:  ·  Problem: Hypothyroidism.   ·  Plan: -c/w levothyroxine 125mcg daily   TSH and free thyroxine wnl  Please make sure LT4 is given on an empty stomach at least one hour apart from other meds and food to ensure med absorption. DO NOT GIVE WITH VITAMINS/ANTACIDS  Pt getting LT4 at 5am and PPI at 8am and 1 hour apart from TF.   Follow up levels as out pt     Problem/Plan - 3:  ·  Problem: Secondary adrenal insufficiency.   ·  Plan: Due to chronic steroid use pt is at risk of tertiary AI  -c/w prednisone 5mg daily  -BP stable would not increase dose of prednisone at this time.     Problem/Plan - 4:  ·  Problem: HTN (hypertension).   ·  Plan: BP goal <130/80  Manage per primary team.     Problem/Plan - 5:  ·  Problem: Other hyperlipidemia.   ·  Plan: LDL goal <70 due to DM  Pt LDL NA  Order fasting lipid if not recently done  Consider moderate intensity Statin if not contraindicated and based on risks/benefits for pt  Manage per primary team   F/u levels as out pt. 72 y/o F w/h/o T2DM with unknown control due to anemia of chronic disease skewing A1C levels. On Levemir and Humalog insulin PTA. Unknown DM complications. Also h/o HTN, HLD, anemia, hypothyroidism, RA, fibromyalgia, remote cerebral aneurysm repair, acute hypercapnic respiratory failure now with tracheostomy, PEG tube, and bedbound (trach change 8/18, PEG change 8/14), sacral pressure ulcer, BIBEMS from home for 6 day hx of bleeding from the tracheostomy and PEG tube with associated abdominal pain> now resolved. Endocrinology consulted for hyperglycemia. Reviewed chart> Tolerating TFs from 6am to 2am with BG levels mostly at goal (100 to low 200s due to age and comorbidities and AMS). noted 6pm  yesterday and 12mn BG 96  without hypoglycemia. Will preventively decrease regular insulin dose at 6pm and continue rest of insulin doses the same. Per primary team planning discharge later this week.     POC  POCT Blood Glucose.: 169 mg/dL (12-12-23 @ 12:17)  POCT Blood Glucose.: 209 mg/dL (12-12-23 @ 05:36)  POCT Blood Glucose.: 96 mg/dL (12-11-23 @ 23:55)  POCT Blood Glucose.: 102 mg/dL (12-11-23 @ 17:33)  POCT Blood Glucose.: 157 mg/dL (12-11-23 @ 11:51)  POCT Blood Glucose.: 124 mg/dL (12-11-23 @ 08:06)  POCT Blood Glucose.: 132 mg/dL (12-11-23 @ 06:07)  POCT Blood Glucose.: 128 mg/dL (12-10-23 @ 23:31)  POCT Blood Glucose.: 103 mg/dL (12-10-23 @ 19:24)  POCT Blood Glucose.: 133 mg/dL (12-10-23 @ 18:02)       MEDICATIONS    insulin glargine Injectable (LANTUS) 18 Unit(s) SubCutaneous every morning  insulin lispro (ADMELOG) corrective regimen sliding scale   SubCutaneous every 6 hours  insulin regular  human recombinant 11 Unit(s) SubCutaneous <User Schedule>  insulin regular  human recombinant 9 Unit(s) SubCutaneous <User Schedule>  insulin regular  human recombinant 24 Unit(s) SubCutaneous <User Schedule>  levothyroxine 125 MICROGram(s) Oral <User Schedule>  predniSONE   Tablet 5 milliGRAM(s) Oral daily      Diet, NPO with Tube Feed:   Tube Feeding Modality: Gastrostomy  Casie [employer] glucose support 1.2  Total Volume for 24 Hours (mL): 1200  Continuous  Starting Tube Feed Rate {mL per Hour}: 60  Increase Tube Feed Rate by (mL): 0  Until Goal Tube Feed Rate (mL per Hour): 60  Tube Feed Duration (in Hours): 20  Tube Feed Start Time: 06:00  Tube Feed Stop Time: 02:00  Free Water Flush  Pump   Rate (mL per Hour): 10   Frequency: Every 2 Hours  Alfred(7 Gm Arginine/7 Gm Glut/1.2 Gm HMB     Qty per Day:  2 (11-29-23 @ 14:12) [Active]      PLAN:   Problem/Plan - 1:  ·  Problem: Type 2 diabetes mellitus with hyperglycemia, with long-term current use of insulin.   ·  Plan: Test BG q6h while on TFs/NPO  -C/w lantus 18 units daily in am  -C/w Regular insulin to 24 at 6am, 11 units at 12 noon and 9 units at 6pm. PLEASE DO NOT HOLD. CAN GIVE LOWER DOSE IF CONCERN FOR HYPOGLYCEMIA.   -C/w moderate admelog correction scale every 6 hours  -Will follow and adjust insulin as needed  DISCHARGE:  -Will be determined according to BG/insulin needs/TFs and steroid therapy at time of discharge. If discharge within next 24 hours will continie with same insulin types and  doses as noted above except correction Admelog scale   -Needs telemedicine as out pt due to bedbound status. Can follow at [facility]. [address] [place] [address] [address]. Phone . Will send email closer to discharge  Make sure pt has Rx for all DM supplies and insulin.     Problem/Plan - 2:  ·  Problem: Hypothyroidism.   ·  Plan: -c/w levothyroxine 125mcg daily   TSH and free thyroxine wnl  Please make sure LT4 is given on an empty stomach at least one hour apart from other meds and food to ensure med absorption. DO NOT GIVE WITH VITAMINS/ANTACIDS  Pt getting LT4 at 5am and PPI at 8am and 1 hour apart from TF.   Follow up levels as out pt     Problem/Plan - 3:  ·  Problem: Secondary adrenal insufficiency.   ·  Plan: Due to chronic steroid use pt is at risk of tertiary AI  -c/w prednisone 5mg daily  -BP stable would not increase dose of prednisone at this time.     Problem/Plan - 4:  ·  Problem: HTN (hypertension).   ·  Plan: BP goal <130/80  Manage per primary team.     Problem/Plan - 5:  ·  Problem: Other hyperlipidemia.   ·  Plan: LDL goal <70 due to DM  Pt LDL NA  Order fasting lipid if not recently done  Consider moderate intensity Statin if not contraindicated and based on risks/benefits for pt  Manage per primary team   F/u levels as out pt.

## 2023-12-12 NOTE — PROGRESS NOTE ADULT - PROBLEM SELECTOR PLAN 2
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   - S/p CT abd/pelvis (10/28): Sacral decubitus ulcer extending to the coccyx with osseous remodeling and pelvic MRI or bone scan to recc to exclude osteomyelitis.  - 10/30 wound care note: Sacral stage 4 pressure injury 4.5cm x 2.5cm x 1.5cm w/ lip of undermining circumferentially greatest 9-12 of 3cm.  - MRI pelvis 10/30 with Osteomyelitis, completed Cefepime for 7 days, Vancomycin d/marli   - 11/10: resumed Cipro 500mg q 12 and Keflex 500mg q 6 until 12/10.  - 11/20: Changed to IV Cipro 400 q12 as recommended by ID pharmacist due to multiple drug interactions when given via PEG  - 11/28 wound care note: Sacral stage 4pressure injury 4.5cm x 2.5cm x 0.5cm, moist granular wound bed   palpable but not exposed bone no blistering, (+) serosanguinous drainage. No odor, erythema, increased warmth, tenderness, induration, fluctuance, nor crepitus.  - 12/3 wound culture ordered - d/c'd as no need for culture, wound improving, no signs of infection  - 12/10 completed cipro and keflex x5week course.  - wound care following

## 2023-12-12 NOTE — PROGRESS NOTE ADULT - SUBJECTIVE AND OBJECTIVE BOX
Patient is a 73y old  Female who presents with a chief complaint of bleeding (04 Dec 2023 16:57)      Interval Events:    REVIEW OF SYSTEMS:  [ ] Positive  [ ] All other systems negative  [ ] Unable to assess ROS because ________    Vital Signs Last 24 Hrs  T(C): 36.7 (12-12-23 @ 04:23), Max: 36.8 (12-11-23 @ 11:55)  T(F): 98.1 (12-12-23 @ 04:23), Max: 98.3 (12-11-23 @ 11:55)  HR: 84 (12-12-23 @ 06:20) (71 - 87)  BP: 142/72 (12-12-23 @ 04:23) (117/56 - 142/72)  RR: 14 (12-12-23 @ 04:23) (14 - 18)  SpO2: 98% (12-12-23 @ 06:20) (97% - 100%)PHYSICAL EXAM:  HEENT:   [ ]Tracheostomy:  [ ]Pupils equal  [ ]No oral lesions  [ ]Abnormal        SKIN  [ ]No Rash  [ ] Abnormal  [ ] pressure    CARDIAC  [ ]Regular  [ ]Abnormal    PULMONARY  [ ]Bilateral Clear Breath Sounds  [ ]Normal Excursion  [ ]Abnormal    GI  [ ]PEG      [ ] +BS		              [ ]Soft, nondistended, nontender	  [ ]Abnormal    MUSCULOSKELETAL                                   [ ]Bedbound                 [ ]Abnormal    [ ]Ambulatory/OOB to chair                           EXTREMITIES                                         [ ]Normal  [ ]Edema                           NEUROLOGIC  [ ] Normal, non focal  [ ] Focal findings:    PSYCHIATRIC  [ ]Alert and appropriate  [ ] Sedated	 [ ]Agitated    :  Mcbride: [ ] Yes, if yes: Date of Placement:                   [  ] No    LINES: Central Lines [ ] Yes, if yes: Date of Placement                                     [  ] No    HOSPITAL MEDICATIONS:  MEDICATIONS  (STANDING):  albuterol/ipratropium for Nebulization 3 milliLiter(s) Nebulizer every 6 hours  amLODIPine   Tablet 10 milliGRAM(s) Oral daily  artificial  tears Solution 2 Drop(s) Both EYES four times a day  ascorbic acid 500 milliGRAM(s) Oral daily  calcium carbonate    500 mG (Tums) Chewable 1 Tablet(s) Chew every 12 hours  chlorhexidine 0.12% Liquid 15 milliLiter(s) Oral Mucosa every 12 hours  chlorhexidine 4% Liquid 1 Application(s) Topical <User Schedule>  dextrose 50% Injectable 50 milliLiter(s) IV Push once  dextrose 50% Injectable 25 Gram(s) IV Push once  dextrose 50% Injectable 12.5 Gram(s) IV Push once  escitalopram 10 milliGRAM(s) Oral <User Schedule>  fentaNYL   Patch  25 MICROgram(s)/Hr 1 Patch Transdermal every 72 hours  gabapentin Solution 100 milliGRAM(s) Enteral Tube at bedtime  glucagon  Injectable 1 milliGRAM(s) IntraMuscular once  hemorrhoidal Ointment      hemorrhoidal Ointment 1 Application(s) Rectal daily  insulin glargine Injectable (LANTUS) 18 Unit(s) SubCutaneous every morning  insulin lispro (ADMELOG) corrective regimen sliding scale   SubCutaneous every 6 hours  insulin regular  human recombinant 24 Unit(s) SubCutaneous <User Schedule>  insulin regular  human recombinant 11 Unit(s) SubCutaneous <User Schedule>  levothyroxine 125 MICROGram(s) Oral <User Schedule>  lidocaine   4% Patch 1 Patch Transdermal daily  melatonin 5 milliGRAM(s) Oral at bedtime  melatonin 1 milliGRAM(s) Oral at bedtime  multivitamin/minerals/iron Oral Solution (CENTRUM) 15 milliLiter(s) Oral daily  nystatin Powder 1 Application(s) Topical every 8 hours  pantoprazole   Suspension 40 milliGRAM(s) Enteral Tube <User Schedule>  petrolatum Ophthalmic Ointment 1 Application(s) Both EYES four times a day  predniSONE   Tablet 5 milliGRAM(s) Oral daily  QUEtiapine 12.5 milliGRAM(s) Oral <User Schedule>  simethicone 80 milliGRAM(s) Chew four times a day  sodium chloride 3%  Inhalation 4 milliLiter(s) Inhalation every 12 hours  zinc oxide 40% Paste 1 Application(s) Topical daily    MEDICATIONS  (PRN):  acetaminophen   Oral Liquid .. 1000 milliGRAM(s) Enteral Tube every 6 hours PRN Temp greater or equal to 38C (100.4F), Mild Pain (1 - 3)  benzocaine 20% Spray 1 Spray(s) Topical four times a day PRN Cough  diclofenac sodium 1% Gel 2 Gram(s) Topical four times a day PRN pain  diphenhydrAMINE Elixir 25 milliGRAM(s) Oral every 6 hours PRN Rash and/or Itching  guaifenesin/dextromethorphan Oral Liquid 10 milliLiter(s) Oral every 6 hours PRN Cough  sodium chloride 0.65% Nasal 1 Spray(s) Both Nostrils every 6 hours PRN Nasal Congestion      LABS:                  CAPILLARY BLOOD GLUCOSE    MICROBIOLOGY:     RADIOLOGY:  [ ] Reviewed and interpreted by me    Mode: AC/ CMV (Assist Control/ Continuous Mandatory Ventilation)  RR (machine): 12  TV (machine): 300  FiO2: 30  PEEP: 5  ITime: 1  MAP: 9  PIP: 27   Patient is a 73y old  Female who presents with a chief complaint of bleeding (04 Dec 2023 16:57)      Interval Events: HD Stable overnight with no acute events     REVIEW OF SYSTEMS:  [ ] Positive  [x ] All other systems negative  [ ] Unable to assess ROS because ________    Vital Signs Last 24 Hrs  T(C): 36.7 (12-12-23 @ 04:23), Max: 36.8 (12-11-23 @ 11:55)  T(F): 98.1 (12-12-23 @ 04:23), Max: 98.3 (12-11-23 @ 11:55)  HR: 84 (12-12-23 @ 06:20) (71 - 87)  BP: 142/72 (12-12-23 @ 04:23) (117/56 - 142/72)  RR: 14 (12-12-23 @ 04:23) (14 - 18)  SpO2: 98% (12-12-23 @ 06:20) (97% - 100%)    PHYSICAL EXAM:    HEENT:   [X]Tracheostomy: #8 distal XLT Shiley  [X]Pupils equal  [ ]No oral lesions  [ ]Abnormal    SKIN  [ ]No Rash  [ ] Abnormal  [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    CARDIAC  [X]Regular  [ ]Abnormal    PULMONARY  [ ]Bilateral Clear Breath Sounds  [ ]Normal Excursion  [X]Abnormal - Mild Course Breaths bilaterally     GI  [X]PEG site c/d/I    [X] +BS		              [X]Soft, nondistended, nontender	  [ ]Abnormal    MUSCULOSKELETAL                                   [X]Bedbound                 [ ]Abnormal    [ ]Ambulatory/OOB to chair                           EXTREMITIES                                         [X]Normal  [ ]Edema                           NEUROLOGIC  [ ] Normal, non focal  [X] Focal findings: opens eyes spontaneously, follows commands on all 4 extremities, able to mouth words    PSYCHIATRIC  [X]Alert and appropriate  [ ] Sedated	 [ ]Agitated    :  Mcbried: [ ] Yes, if yes: Date of Placement:                   [X] No    LINES: Central Lines [ ] Yes, if yes: Date of Placement                                     [X] No    HOSPITAL MEDICATIONS:  MEDICATIONS  (STANDING):  albuterol/ipratropium for Nebulization 3 milliLiter(s) Nebulizer every 6 hours  amLODIPine   Tablet 10 milliGRAM(s) Oral daily  artificial  tears Solution 2 Drop(s) Both EYES four times a day  ascorbic acid 500 milliGRAM(s) Oral daily  calcium carbonate    500 mG (Tums) Chewable 1 Tablet(s) Chew every 12 hours  chlorhexidine 0.12% Liquid 15 milliLiter(s) Oral Mucosa every 12 hours  chlorhexidine 4% Liquid 1 Application(s) Topical <User Schedule>  dextrose 50% Injectable 50 milliLiter(s) IV Push once  dextrose 50% Injectable 25 Gram(s) IV Push once  dextrose 50% Injectable 12.5 Gram(s) IV Push once  escitalopram 10 milliGRAM(s) Oral <User Schedule>  fentaNYL   Patch  25 MICROgram(s)/Hr 1 Patch Transdermal every 72 hours  gabapentin Solution 100 milliGRAM(s) Enteral Tube at bedtime  glucagon  Injectable 1 milliGRAM(s) IntraMuscular once  hemorrhoidal Ointment      hemorrhoidal Ointment 1 Application(s) Rectal daily  insulin glargine Injectable (LANTUS) 18 Unit(s) SubCutaneous every morning  insulin lispro (ADMELOG) corrective regimen sliding scale   SubCutaneous every 6 hours  insulin regular  human recombinant 24 Unit(s) SubCutaneous <User Schedule>  insulin regular  human recombinant 11 Unit(s) SubCutaneous <User Schedule>  levothyroxine 125 MICROGram(s) Oral <User Schedule>  lidocaine   4% Patch 1 Patch Transdermal daily  melatonin 5 milliGRAM(s) Oral at bedtime  melatonin 1 milliGRAM(s) Oral at bedtime  multivitamin/minerals/iron Oral Solution (CENTRUM) 15 milliLiter(s) Oral daily  nystatin Powder 1 Application(s) Topical every 8 hours  pantoprazole   Suspension 40 milliGRAM(s) Enteral Tube <User Schedule>  petrolatum Ophthalmic Ointment 1 Application(s) Both EYES four times a day  predniSONE   Tablet 5 milliGRAM(s) Oral daily  QUEtiapine 12.5 milliGRAM(s) Oral <User Schedule>  simethicone 80 milliGRAM(s) Chew four times a day  sodium chloride 3%  Inhalation 4 milliLiter(s) Inhalation every 12 hours  zinc oxide 40% Paste 1 Application(s) Topical daily    MEDICATIONS  (PRN):  acetaminophen   Oral Liquid .. 1000 milliGRAM(s) Enteral Tube every 6 hours PRN Temp greater or equal to 38C (100.4F), Mild Pain (1 - 3)  benzocaine 20% Spray 1 Spray(s) Topical four times a day PRN Cough  diclofenac sodium 1% Gel 2 Gram(s) Topical four times a day PRN pain  diphenhydrAMINE Elixir 25 milliGRAM(s) Oral every 6 hours PRN Rash and/or Itching  guaifenesin/dextromethorphan Oral Liquid 10 milliLiter(s) Oral every 6 hours PRN Cough  sodium chloride 0.65% Nasal 1 Spray(s) Both Nostrils every 6 hours PRN Nasal Congestion      LABS:                  CAPILLARY BLOOD GLUCOSE    MICROBIOLOGY:     RADIOLOGY:  [ ] Reviewed and interpreted by me    Mode: AC/ CMV (Assist Control/ Continuous Mandatory Ventilation)  RR (machine): 12  TV (machine): 300  FiO2: 30  PEEP: 5  ITime: 1  MAP: 9  PIP: 27   Patient is a 73y old  Female who presents with a chief complaint of bleeding (04 Dec 2023 16:57)      Interval Events: HD Stable overnight with no acute events     REVIEW OF SYSTEMS:  [ ] Positive  [x ] All other systems negative  [ ] Unable to assess ROS because ________    Vital Signs Last 24 Hrs  T(C): 36.7 (12-12-23 @ 04:23), Max: 36.8 (12-11-23 @ 11:55)  T(F): 98.1 (12-12-23 @ 04:23), Max: 98.3 (12-11-23 @ 11:55)  HR: 84 (12-12-23 @ 06:20) (71 - 87)  BP: 142/72 (12-12-23 @ 04:23) (117/56 - 142/72)  RR: 14 (12-12-23 @ 04:23) (14 - 18)  SpO2: 98% (12-12-23 @ 06:20) (97% - 100%)    PHYSICAL EXAM:    HEENT:   [X]Tracheostomy: #8 distal XLT Shiley  [X]Pupils equal  [ ]No oral lesions  [ ]Abnormal    SKIN  [ ]No Rash  [ ] Abnormal  [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    CARDIAC  [X]Regular  [ ]Abnormal    PULMONARY  [ ]Bilateral Clear Breath Sounds  [ ]Normal Excursion  [X]Abnormal - Mild Course Breaths bilaterally     GI  [X]PEG site c/d/I    [X] +BS		              [X]Soft, nondistended, nontender	  [ ]Abnormal    MUSCULOSKELETAL                                   [X]Bedbound                 [ ]Abnormal    [ ]Ambulatory/OOB to chair                           EXTREMITIES                                         [X]Normal  [ ]Edema                           NEUROLOGIC  [ ] Normal, non focal  [X] Focal findings: opens eyes spontaneously, follows commands on all 4 extremities, able to mouth words    PSYCHIATRIC  [X]Alert and appropriate  [ ] Sedated	 [ ]Agitated    :  Mcbride: [ ] Yes, if yes: Date of Placement:                   [X] No    LINES: Central Lines [ ] Yes, if yes: Date of Placement                                     [X] No    HOSPITAL MEDICATIONS:  MEDICATIONS  (STANDING):  albuterol/ipratropium for Nebulization 3 milliLiter(s) Nebulizer every 6 hours  amLODIPine   Tablet 10 milliGRAM(s) Oral daily  artificial  tears Solution 2 Drop(s) Both EYES four times a day  ascorbic acid 500 milliGRAM(s) Oral daily  calcium carbonate    500 mG (Tums) Chewable 1 Tablet(s) Chew every 12 hours  chlorhexidine 0.12% Liquid 15 milliLiter(s) Oral Mucosa every 12 hours  chlorhexidine 4% Liquid 1 Application(s) Topical <User Schedule>  dextrose 50% Injectable 50 milliLiter(s) IV Push once  dextrose 50% Injectable 25 Gram(s) IV Push once  dextrose 50% Injectable 12.5 Gram(s) IV Push once  escitalopram 10 milliGRAM(s) Oral <User Schedule>  fentaNYL   Patch  25 MICROgram(s)/Hr 1 Patch Transdermal every 72 hours  gabapentin Solution 100 milliGRAM(s) Enteral Tube at bedtime  glucagon  Injectable 1 milliGRAM(s) IntraMuscular once  hemorrhoidal Ointment      hemorrhoidal Ointment 1 Application(s) Rectal daily  insulin glargine Injectable (LANTUS) 18 Unit(s) SubCutaneous every morning  insulin lispro (ADMELOG) corrective regimen sliding scale   SubCutaneous every 6 hours  insulin regular  human recombinant 24 Unit(s) SubCutaneous <User Schedule>  insulin regular  human recombinant 11 Unit(s) SubCutaneous <User Schedule>  levothyroxine 125 MICROGram(s) Oral <User Schedule>  lidocaine   4% Patch 1 Patch Transdermal daily  melatonin 5 milliGRAM(s) Oral at bedtime  melatonin 1 milliGRAM(s) Oral at bedtime  multivitamin/minerals/iron Oral Solution (CENTRUM) 15 milliLiter(s) Oral daily  nystatin Powder 1 Application(s) Topical every 8 hours  pantoprazole   Suspension 40 milliGRAM(s) Enteral Tube <User Schedule>  petrolatum Ophthalmic Ointment 1 Application(s) Both EYES four times a day  predniSONE   Tablet 5 milliGRAM(s) Oral daily  QUEtiapine 12.5 milliGRAM(s) Oral <User Schedule>  simethicone 80 milliGRAM(s) Chew four times a day  sodium chloride 3%  Inhalation 4 milliLiter(s) Inhalation every 12 hours  zinc oxide 40% Paste 1 Application(s) Topical daily    MEDICATIONS  (PRN):  acetaminophen   Oral Liquid .. 1000 milliGRAM(s) Enteral Tube every 6 hours PRN Temp greater or equal to 38C (100.4F), Mild Pain (1 - 3)  benzocaine 20% Spray 1 Spray(s) Topical four times a day PRN Cough  diclofenac sodium 1% Gel 2 Gram(s) Topical four times a day PRN pain  diphenhydrAMINE Elixir 25 milliGRAM(s) Oral every 6 hours PRN Rash and/or Itching  guaifenesin/dextromethorphan Oral Liquid 10 milliLiter(s) Oral every 6 hours PRN Cough  sodium chloride 0.65% Nasal 1 Spray(s) Both Nostrils every 6 hours PRN Nasal Congestion      LABS:                  CAPILLARY BLOOD GLUCOSE    MICROBIOLOGY:     RADIOLOGY:  [ ] Reviewed and interpreted by me    Mode: AC/ CMV (Assist Control/ Continuous Mandatory Ventilation)  RR (machine): 12  TV (machine): 300  FiO2: 30  PEEP: 5  ITime: 1  MAP: 9  PIP: 27

## 2023-12-12 NOTE — PROGRESS NOTE ADULT - NS ATTEND AMEND GEN_ALL_CORE FT
73F with a h/o DM, HTN, HLD, anemia, hypothyroidism, RA, fibromyalgia, remote cerebral aneurysm with repair, hypercapnic respiratory failure s/p tracheostomy/PEG, bedbound, sacral pressure ulcer. Admitted w/ bleeding from tracheostomy and PEG w/ associated abdominal pain, recent hx of auditory/visual hallucinations.   Imaging showed mild thickening along PEG tube tract and sacral ulcer extending to coccyx suggestive of acute osteomyelitis.      # Osteomyelitis  # Chronic hypoxemic RF  # oropharyngeal dysphagia  # acute on chronic pain  # Trach/Peg bleeding  # Tracheitis with Pseudomonas and serratia  # Hx of hallucination, anxiety     #Neuro - awake, alert, and interactive. moving all extremities, mouthing words  Continue current medications  - Behavioral Health Follow-up as needed   #Cardiovascular - hemodynamically stable  #Pulmonary - chronic hypoxemic respiratory failure, has 8XLT trach, on home vent, pressure support trials daily 15/5  #Gastro - oropharyngeal dysphagia with PEG tube in place, tolerating feeds, nutrition follow up appreciated   Abdominal sono 12/1 and CT abdomen 12/4- No acute intra-abdominal findings.   #Infectious Disease - sacral osteo on MRI - wound care follow-up appreciated, c/w offloading and local wound care  -The patient has had this wound since a hospitalization in December 2022 and per daughter does not have chronic or multiple wounds but only this single wound.   - sp 6 week course of Cipro and Keflex as per ID - completed 12/10/23  - Per daughter, patient has had prior multiple infections during hospitalizations including prior pseudomonas, MRSA, E faecalis, and Klebsiella  - Sputum colonized with MDR Pseudomonas   - Daughter reports a recent dental abscess and being told by outpatient dentist that patient needed teeth extracted - Dental evaluation noted with no plans for intervention as inpatient.   #Hem/Onc - prior cytopenias in setting of antibiotics per patient's daughter   - Hem/Onc follow-up noted - suspect an anemia of chronic disease  #Pain - continue current pain control - in setting of fibromyalgia and pain from wound  - Voltaren topical, lidocaine patches and low dose Oxycodone as needed  #Derm - previously seen by derm for skin lesions - mid back with irritated seborrheic keratosis - outpatient follow-up  #Endo - DM2 - continue insulin and adjust as needed for goal FS < 180  - Endocrine follow-up appreciated   - Continue Synthroid - TSH WNL  #DVT proph - SCDs  - Prior prophylactic AC stopped after concern for prior bleeding    Dispo planning

## 2023-12-13 LAB
ANION GAP SERPL CALC-SCNC: 9 MMOL/L — SIGNIFICANT CHANGE UP (ref 5–17)
ANION GAP SERPL CALC-SCNC: 9 MMOL/L — SIGNIFICANT CHANGE UP (ref 5–17)
BUN SERPL-MCNC: 26 MG/DL — HIGH (ref 7–23)
BUN SERPL-MCNC: 26 MG/DL — HIGH (ref 7–23)
CALCIUM SERPL-MCNC: 9.4 MG/DL — SIGNIFICANT CHANGE UP (ref 8.4–10.5)
CALCIUM SERPL-MCNC: 9.4 MG/DL — SIGNIFICANT CHANGE UP (ref 8.4–10.5)
CHLORIDE SERPL-SCNC: 104 MMOL/L — SIGNIFICANT CHANGE UP (ref 96–108)
CHLORIDE SERPL-SCNC: 104 MMOL/L — SIGNIFICANT CHANGE UP (ref 96–108)
CO2 SERPL-SCNC: 26 MMOL/L — SIGNIFICANT CHANGE UP (ref 22–31)
CO2 SERPL-SCNC: 26 MMOL/L — SIGNIFICANT CHANGE UP (ref 22–31)
CREAT SERPL-MCNC: <0.3 MG/DL — LOW (ref 0.5–1.3)
CREAT SERPL-MCNC: <0.3 MG/DL — LOW (ref 0.5–1.3)
EGFR: 113 ML/MIN/1.73M2 — SIGNIFICANT CHANGE UP
EGFR: 113 ML/MIN/1.73M2 — SIGNIFICANT CHANGE UP
GLUCOSE BLDC GLUCOMTR-MCNC: 115 MG/DL — HIGH (ref 70–99)
GLUCOSE BLDC GLUCOMTR-MCNC: 115 MG/DL — HIGH (ref 70–99)
GLUCOSE BLDC GLUCOMTR-MCNC: 124 MG/DL — HIGH (ref 70–99)
GLUCOSE BLDC GLUCOMTR-MCNC: 124 MG/DL — HIGH (ref 70–99)
GLUCOSE BLDC GLUCOMTR-MCNC: 164 MG/DL — HIGH (ref 70–99)
GLUCOSE BLDC GLUCOMTR-MCNC: 164 MG/DL — HIGH (ref 70–99)
GLUCOSE BLDC GLUCOMTR-MCNC: 186 MG/DL — HIGH (ref 70–99)
GLUCOSE BLDC GLUCOMTR-MCNC: 186 MG/DL — HIGH (ref 70–99)
GLUCOSE SERPL-MCNC: 167 MG/DL — HIGH (ref 70–99)
GLUCOSE SERPL-MCNC: 167 MG/DL — HIGH (ref 70–99)
HCT VFR BLD CALC: 31.8 % — LOW (ref 34.5–45)
HCT VFR BLD CALC: 31.8 % — LOW (ref 34.5–45)
HGB BLD-MCNC: 9.6 G/DL — LOW (ref 11.5–15.5)
HGB BLD-MCNC: 9.6 G/DL — LOW (ref 11.5–15.5)
MAGNESIUM SERPL-MCNC: 1.9 MG/DL — SIGNIFICANT CHANGE UP (ref 1.6–2.6)
MAGNESIUM SERPL-MCNC: 1.9 MG/DL — SIGNIFICANT CHANGE UP (ref 1.6–2.6)
MCHC RBC-ENTMCNC: 27.9 PG — SIGNIFICANT CHANGE UP (ref 27–34)
MCHC RBC-ENTMCNC: 27.9 PG — SIGNIFICANT CHANGE UP (ref 27–34)
MCHC RBC-ENTMCNC: 30.2 GM/DL — LOW (ref 32–36)
MCHC RBC-ENTMCNC: 30.2 GM/DL — LOW (ref 32–36)
MCV RBC AUTO: 92.4 FL — SIGNIFICANT CHANGE UP (ref 80–100)
MCV RBC AUTO: 92.4 FL — SIGNIFICANT CHANGE UP (ref 80–100)
NRBC # BLD: 0 /100 WBCS — SIGNIFICANT CHANGE UP (ref 0–0)
NRBC # BLD: 0 /100 WBCS — SIGNIFICANT CHANGE UP (ref 0–0)
PHOSPHATE SERPL-MCNC: 4.3 MG/DL — SIGNIFICANT CHANGE UP (ref 2.5–4.5)
PHOSPHATE SERPL-MCNC: 4.3 MG/DL — SIGNIFICANT CHANGE UP (ref 2.5–4.5)
PLATELET # BLD AUTO: 159 K/UL — SIGNIFICANT CHANGE UP (ref 150–400)
PLATELET # BLD AUTO: 159 K/UL — SIGNIFICANT CHANGE UP (ref 150–400)
POTASSIUM SERPL-MCNC: 4.7 MMOL/L — SIGNIFICANT CHANGE UP (ref 3.5–5.3)
POTASSIUM SERPL-MCNC: 4.7 MMOL/L — SIGNIFICANT CHANGE UP (ref 3.5–5.3)
POTASSIUM SERPL-SCNC: 4.7 MMOL/L — SIGNIFICANT CHANGE UP (ref 3.5–5.3)
POTASSIUM SERPL-SCNC: 4.7 MMOL/L — SIGNIFICANT CHANGE UP (ref 3.5–5.3)
RBC # BLD: 3.44 M/UL — LOW (ref 3.8–5.2)
RBC # BLD: 3.44 M/UL — LOW (ref 3.8–5.2)
RBC # FLD: 16.1 % — HIGH (ref 10.3–14.5)
RBC # FLD: 16.1 % — HIGH (ref 10.3–14.5)
SODIUM SERPL-SCNC: 139 MMOL/L — SIGNIFICANT CHANGE UP (ref 135–145)
SODIUM SERPL-SCNC: 139 MMOL/L — SIGNIFICANT CHANGE UP (ref 135–145)
WBC # BLD: 8.57 K/UL — SIGNIFICANT CHANGE UP (ref 3.8–10.5)
WBC # BLD: 8.57 K/UL — SIGNIFICANT CHANGE UP (ref 3.8–10.5)
WBC # FLD AUTO: 8.57 K/UL — SIGNIFICANT CHANGE UP (ref 3.8–10.5)
WBC # FLD AUTO: 8.57 K/UL — SIGNIFICANT CHANGE UP (ref 3.8–10.5)

## 2023-12-13 PROCEDURE — 71045 X-RAY EXAM CHEST 1 VIEW: CPT | Mod: 26

## 2023-12-13 PROCEDURE — 99233 SBSQ HOSP IP/OBS HIGH 50: CPT | Mod: FS

## 2023-12-13 PROCEDURE — 99232 SBSQ HOSP IP/OBS MODERATE 35: CPT

## 2023-12-13 RX ORDER — OXYCODONE HYDROCHLORIDE 5 MG/1
2.5 TABLET ORAL EVERY 6 HOURS
Refills: 0 | Status: DISCONTINUED | OUTPATIENT
Start: 2023-12-13 | End: 2023-12-20

## 2023-12-13 RX ORDER — TRANEXAMIC ACID 100 MG/ML
500 INJECTION, SOLUTION INTRAVENOUS ONCE
Refills: 0 | Status: COMPLETED | OUTPATIENT
Start: 2023-12-13 | End: 2023-12-13

## 2023-12-13 RX ADMIN — Medication 2 DROP(S): at 17:32

## 2023-12-13 RX ADMIN — INSULIN HUMAN 24 UNIT(S): 100 INJECTION, SOLUTION SUBCUTANEOUS at 05:38

## 2023-12-13 RX ADMIN — Medication 1 TABLET(S): at 05:36

## 2023-12-13 RX ADMIN — NYSTATIN CREAM 1 APPLICATION(S): 100000 CREAM TOPICAL at 22:02

## 2023-12-13 RX ADMIN — ESCITALOPRAM OXALATE 10 MILLIGRAM(S): 10 TABLET, FILM COATED ORAL at 08:56

## 2023-12-13 RX ADMIN — Medication 2 DROP(S): at 22:02

## 2023-12-13 RX ADMIN — SIMETHICONE 80 MILLIGRAM(S): 80 TABLET, CHEWABLE ORAL at 17:31

## 2023-12-13 RX ADMIN — INSULIN GLARGINE 18 UNIT(S): 100 INJECTION, SOLUTION SUBCUTANEOUS at 05:39

## 2023-12-13 RX ADMIN — SIMETHICONE 80 MILLIGRAM(S): 80 TABLET, CHEWABLE ORAL at 22:01

## 2023-12-13 RX ADMIN — CHLORHEXIDINE GLUCONATE 15 MILLILITER(S): 213 SOLUTION TOPICAL at 17:37

## 2023-12-13 RX ADMIN — Medication 2 DROP(S): at 12:12

## 2023-12-13 RX ADMIN — CHLORHEXIDINE GLUCONATE 15 MILLILITER(S): 213 SOLUTION TOPICAL at 05:38

## 2023-12-13 RX ADMIN — SODIUM CHLORIDE 4 MILLILITER(S): 9 INJECTION INTRAMUSCULAR; INTRAVENOUS; SUBCUTANEOUS at 05:12

## 2023-12-13 RX ADMIN — Medication 2 DROP(S): at 05:37

## 2023-12-13 RX ADMIN — Medication 3 MILLILITER(S): at 11:49

## 2023-12-13 RX ADMIN — LIDOCAINE 1 PATCH: 4 CREAM TOPICAL at 00:44

## 2023-12-13 RX ADMIN — Medication 500 MILLIGRAM(S): at 12:10

## 2023-12-13 RX ADMIN — ZINC OXIDE 1 APPLICATION(S): 200 OINTMENT TOPICAL at 12:17

## 2023-12-13 RX ADMIN — PHENYLEPHRINE-SHARK LIVER OIL-MINERAL OIL-PETROLATUM RECTAL OINTMENT 1 APPLICATION(S): at 12:17

## 2023-12-13 RX ADMIN — LIDOCAINE 1 PATCH: 4 CREAM TOPICAL at 18:17

## 2023-12-13 RX ADMIN — SIMETHICONE 80 MILLIGRAM(S): 80 TABLET, CHEWABLE ORAL at 12:10

## 2023-12-13 RX ADMIN — TRANEXAMIC ACID 500 MILLIGRAM(S): 100 INJECTION, SOLUTION INTRAVENOUS at 17:56

## 2023-12-13 RX ADMIN — Medication 3 MILLILITER(S): at 05:12

## 2023-12-13 RX ADMIN — Medication 1 MILLIGRAM(S): at 22:02

## 2023-12-13 RX ADMIN — INSULIN HUMAN 11 UNIT(S): 100 INJECTION, SOLUTION SUBCUTANEOUS at 12:11

## 2023-12-13 RX ADMIN — Medication 5 MILLIGRAM(S): at 05:36

## 2023-12-13 RX ADMIN — Medication 1 APPLICATION(S): at 12:13

## 2023-12-13 RX ADMIN — Medication 1: at 05:37

## 2023-12-13 RX ADMIN — Medication 1 APPLICATION(S): at 17:31

## 2023-12-13 RX ADMIN — Medication 1000 MILLIGRAM(S): at 22:32

## 2023-12-13 RX ADMIN — INSULIN HUMAN 9 UNIT(S): 100 INJECTION, SOLUTION SUBCUTANEOUS at 17:38

## 2023-12-13 RX ADMIN — AMLODIPINE BESYLATE 10 MILLIGRAM(S): 2.5 TABLET ORAL at 05:36

## 2023-12-13 RX ADMIN — Medication 5 MILLIGRAM(S): at 22:02

## 2023-12-13 RX ADMIN — Medication 1 APPLICATION(S): at 05:37

## 2023-12-13 RX ADMIN — GABAPENTIN 100 MILLIGRAM(S): 400 CAPSULE ORAL at 22:01

## 2023-12-13 RX ADMIN — Medication 15 MILLILITER(S): at 12:18

## 2023-12-13 RX ADMIN — SIMETHICONE 80 MILLIGRAM(S): 80 TABLET, CHEWABLE ORAL at 05:37

## 2023-12-13 RX ADMIN — Medication 1 APPLICATION(S): at 22:02

## 2023-12-13 RX ADMIN — Medication 1 TABLET(S): at 17:31

## 2023-12-13 RX ADMIN — NYSTATIN CREAM 1 APPLICATION(S): 100000 CREAM TOPICAL at 05:39

## 2023-12-13 RX ADMIN — Medication 1: at 12:10

## 2023-12-13 RX ADMIN — LIDOCAINE 1 PATCH: 4 CREAM TOPICAL at 12:10

## 2023-12-13 RX ADMIN — NYSTATIN CREAM 1 APPLICATION(S): 100000 CREAM TOPICAL at 14:30

## 2023-12-13 RX ADMIN — Medication 1000 MILLIGRAM(S): at 22:01

## 2023-12-13 RX ADMIN — Medication 125 MICROGRAM(S): at 05:36

## 2023-12-13 RX ADMIN — CHLORHEXIDINE GLUCONATE 1 APPLICATION(S): 213 SOLUTION TOPICAL at 05:38

## 2023-12-13 RX ADMIN — Medication 3 MILLILITER(S): at 17:38

## 2023-12-13 RX ADMIN — PANTOPRAZOLE SODIUM 40 MILLIGRAM(S): 20 TABLET, DELAYED RELEASE ORAL at 08:56

## 2023-12-13 RX ADMIN — QUETIAPINE FUMARATE 12.5 MILLIGRAM(S): 200 TABLET, FILM COATED ORAL at 17:39

## 2023-12-13 NOTE — PROGRESS NOTE ADULT - ASSESSMENT
74 y/o F with PMHx of T2DM, HTN, HLD, anemia, hypothyroidism, RA, fibromyalgia, remote cerebral aneurysm repair, acute hypercapnic respiratory failure now with tracheostomy, PEG tube, and bedbound (trach change 8/18, PEG change 8/14), sacral pressure ulcer, BIBEMS from home for 6 day hx of bleeding from the tracheostomy and PEG tube with associated abdominal pain, recent history of auditory and visual hallucinations, and with chronic sacral decubitus ulcer. Exam notable for mild abdominal distention with LLQ and RLQ tenderness, tracheostomy in place with no obvious bleeding, PEG tube with scant amount of dried blood, at baseline neurologic status. Imaging showing no active bleeding around tracheostomy site, however was notable for mild thickening along PEG tube tract, sacral ulcer extending to the coccyx suggestive of possible osteomyelitis, and bibasilar patchy lung opacities with volume loss. Patient admitted for respiratory support, wound care of tracheostomy and PEG, and management of sacral osteomyelitis. No further Trach and peg site bleeding noted since admission.  Pelvic MRI imaging also suggestive of Acute OM. Patient placed on long-term antibiotics with Infectious disease guidance.  Additionally treated with a 7d course of Cefepime due to PSA and Serratia tracheitis on 11/8-->11/15 and then resumed cipro/keflex.  Hospital course c/b Delirium for which Seroquel was added and home Lexapro continued. Tracheostomy changed to #9 Cuffed [place] by ENT 11/3.  Despite trach change patient remained with persistent air leak.  On 11/19, the trach was again changed due to leak and loss of volumes on vent.  Trach was changed to #8 distal cuffed Shiley.  Patient seen by Dermatology for back lesions.  One diagnosed as a seborrheic keratitis and the other a dermatofibroma.  Intermittent abdominal pain throughout hospital course, at times relieved with gas/BM, w/u negative, seen by GI - no recs.  12/10 pt completed cipro and keflex for osteo treatment.  12/13 moderate amounts of secretions w/ blood - tranexamic acid neb given.      Wound Consult requested to assist w/ management of:  Sacral stage 4 pressure injury    Sacral wound- improving, continue w/Aquacel dressing QD  Trach Mngt as per RCU  PEG Mngt as per GI  BLE elevation  Abx per RCU/ ID  Moisturize intact skin w/ SWEEN cream BID  Nutrition Consult for optimization          encourage high quality protein, ayush/ prosource, MVI & Vit C to promote wound healing  Hyperglycemia -  ADA diet and  FS w/ ISS,  Continue turning and positioning w/ offloading assistive devices as per protocol  Buttock Kanchan BID and prn soiling        Continue w/ attends under pads and Pericare w/ emerson cath maintenance as per protocol  Waffle Cushion to chair when oob to chair  Continue w/ low air loss pressure redistribution bed surface   Pt will need Group 2 mattress on hospital bed and ROHO cushion for wheel chair upon discharge home  Care as per RCU, will follow w/ you  Upon discharge f/u as outpatient at [facility] [address] [facility] [phone]  d/w mary  RN & team   Angela Anthony PA-C CWS [id number]  Nights/ Weekends/ Holidays please call:  General Surgery Consult pager [phone]) for emergencies  Wound PT for multilayer leg wrapping or VAC issues ([phone])   I spent 35 minutes face to face w/ this pt of which more than 50% of the time was spent counseling & coordinating care of this pt.  72 y/o F with PMHx of T2DM, HTN, HLD, anemia, hypothyroidism, RA, fibromyalgia, remote cerebral aneurysm repair, acute hypercapnic respiratory failure now with tracheostomy, PEG tube, and bedbound (trach change 8/18, PEG change 8/14), sacral pressure ulcer, BIBEMS from home for 6 day hx of bleeding from the tracheostomy and PEG tube with associated abdominal pain, recent history of auditory and visual hallucinations, and with chronic sacral decubitus ulcer. Exam notable for mild abdominal distention with LLQ and RLQ tenderness, tracheostomy in place with no obvious bleeding, PEG tube with scant amount of dried blood, at baseline neurologic status. Imaging showing no active bleeding around tracheostomy site, however was notable for mild thickening along PEG tube tract, sacral ulcer extending to the coccyx suggestive of possible osteomyelitis, and bibasilar patchy lung opacities with volume loss. Patient admitted for respiratory support, wound care of tracheostomy and PEG, and management of sacral osteomyelitis. No further Trach and peg site bleeding noted since admission.  Pelvic MRI imaging also suggestive of Acute OM. Patient placed on long-term antibiotics with Infectious disease guidance.  Additionally treated with a 7d course of Cefepime due to PSA and Serratia tracheitis on 11/8-->11/15 and then resumed cipro/keflex.  Hospital course c/b Delirium for which Seroquel was added and home Lexapro continued. Tracheostomy changed to #9 Cuffed [place] by ENT 11/3.  Despite trach change patient remained with persistent air leak.  On 11/19, the trach was again changed due to leak and loss of volumes on vent.  Trach was changed to #8 distal cuffed Shiley.  Patient seen by Dermatology for back lesions.  One diagnosed as a seborrheic keratitis and the other a dermatofibroma.  Intermittent abdominal pain throughout hospital course, at times relieved with gas/BM, w/u negative, seen by GI - no recs.  12/10 pt completed cipro and keflex for osteo treatment.  12/13 moderate amounts of secretions w/ blood - tranexamic acid neb given.      Wound Consult requested to assist w/ management of:  Sacral stage 4 pressure injury    Sacral wound- improving, continue w/Aquacel dressing QD  Trach Mngt as per RCU  PEG Mngt as per GI  BLE elevation  Abx per RCU/ ID  Moisturize intact skin w/ SWEEN cream BID  Nutrition Consult for optimization          encourage high quality protein, ayush/ prosource, MVI & Vit C to promote wound healing  Hyperglycemia -  ADA diet and  FS w/ ISS,  Continue turning and positioning w/ offloading assistive devices as per protocol  Buttock Kanchan BID and prn soiling        Continue w/ attends under pads and Pericare w/ emerson cath maintenance as per protocol  Waffle Cushion to chair when oob to chair  Continue w/ low air loss pressure redistribution bed surface   Pt will need Group 2 mattress on hospital bed and ROHO cushion for wheel chair upon discharge home  Care as per RCU, will follow w/ you  Upon discharge f/u as outpatient at Wound Center [address] [facility] [phone]  d/w mary  RN & team   Angela Anthony PA-C CWS [id number]  Nights/ Weekends/ Holidays please call:  General Surgery Consult pager ([phone]) for emergencies  Wound PT for multilayer leg wrapping or VAC issues (x [phone])   I spent 35 minutes face to face w/ this pt of which more than 50% of the time was spent counseling & coordinating care of this pt.

## 2023-12-13 NOTE — PROGRESS NOTE ADULT - SUBJECTIVE AND OBJECTIVE BOX
Patient is a 73y old  Female who presents with a chief complaint of peg tube   DATE OF SERVICE: 12/13/2023      SUBJECTIVE / OVERNIGHT EVENTS: Afebrile.  Blood tinged trach secretions reported  No new symptoms    MEDICATIONS  (STANDING):  albuterol/ipratropium for Nebulization 3 milliLiter(s) Nebulizer every 6 hours  artificial  tears Solution 2 Drop(s) Both EYES four times a day  ascorbic acid 500 milliGRAM(s) Oral daily  bacitracin   Ointment 1 Application(s) Topical two times a day  bisacodyl Suppository 10 milliGRAM(s) Rectal once  calcium carbonate    500 mG (Tums) Chewable 1 Tablet(s) Chew every 12 hours  cephalexin 500 milliGRAM(s) Oral every 6 hours  chlorhexidine 0.12% Liquid 15 milliLiter(s) Oral Mucosa every 12 hours  chlorhexidine 4% Liquid 1 Application(s) Topical <User Schedule>  ciprofloxacin     Tablet 500 milliGRAM(s) Oral every 12 hours  dextrose 50% Injectable 50 milliLiter(s) IV Push once  enoxaparin Injectable 40 milliGRAM(s) SubCutaneous every 24 hours  escitalopram 10 milliGRAM(s) Oral daily  fentaNYL   Patch  25 MICROgram(s)/Hr 1 Patch Transdermal every 72 hours  gabapentin Solution 100 milliGRAM(s) Enteral Tube at bedtime  insulin glargine Injectable (LANTUS) 14 Unit(s) SubCutaneous every morning  insulin lispro (ADMELOG) corrective regimen sliding scale   SubCutaneous every 6 hours  levothyroxine 125 MICROGram(s) Oral <User Schedule>  lidocaine   4% Patch 1 Patch Transdermal daily  melatonin 3 milliGRAM(s) Oral at bedtime  multivitamin/minerals/iron Oral Solution (CENTRUM) 15 milliLiter(s) Oral daily  nystatin Powder 1 Application(s) Topical every 8 hours  oxybutynin 5 milliGRAM(s) Oral daily  pantoprazole   Suspension 40 milliGRAM(s) Enteral Tube daily  petrolatum Ophthalmic Ointment 1 Application(s) Both EYES four times a day  predniSONE   Tablet 5 milliGRAM(s) Oral daily  QUEtiapine 12.5 milliGRAM(s) Oral <User Schedule>  QUEtiapine 12.5 milliGRAM(s) Oral <User Schedule>  senna Syrup 10 milliLiter(s) Oral at bedtime  simethicone 80 milliGRAM(s) Chew four times a day  sodium chloride 3%  Inhalation 4 milliLiter(s) Inhalation every 12 hours  triamcinolone 0.1% Ointment 1 Application(s) Topical two times a day  zinc oxide 40% Paste 1 Application(s) Topical daily  zinc sulfate 220 milliGRAM(s) Oral daily    MEDICATIONS  (PRN):  acetaminophen   Oral Liquid .. 1000 milliGRAM(s) Enteral Tube every 6 hours PRN Temp greater or equal to 38C (100.4F), Mild Pain (1 - 3)  benzocaine 20% Spray 1 Spray(s) Topical four times a day PRN Cough  diphenhydrAMINE Elixir 25 milliGRAM(s) Oral every 6 hours PRN Rash and/or Itching  oxyCODONE    Solution 2.5 milliGRAM(s) Oral every 6 hours PRN Moderate Pain (4 - 6)  sodium chloride 0.65% Nasal 1 Spray(s) Both Nostrils every 6 hours PRN Nasal Congestion                PHYSICAL EXAM:  Vital Signs Last 24 Hrs  T(C): 37.1 (13 Dec 2023 14:45), Max: 37.1 (13 Dec 2023 14:45)  T(F): 98.7 (13 Dec 2023 14:45), Max: 98.7 (13 Dec 2023 14:45)  HR: 86 (13 Dec 2023 13:18) (81 - 95)  BP: 123/55 (13 Dec 2023 13:18) (123/55 - 143/67)  BP(mean): --  RR: 18 (13 Dec 2023 13:18) (15 - 18)  SpO2: 100% (13 Dec 2023 13:18) (98% - 100%)    Parameters below as of 13 Dec 2023 13:18  Patient On (Oxygen Delivery Method): ventilator        GENERAL: NAD, well-developed  HEAD:  Atraumatic, Normocephalic  EYES: EOMI, PERRLA, conjunctiva and sclera clear  NECK: Supple, [facility]VD. [facility]  CHEST/LUNG: Clear to auscultation bilaterally; No wheeze  HEART: Regular rate and rhythm; No murmurs, rubs, or gallops  ABDOMEN: Soft, Nontender, Nondistended; Bowel sounds present  EXTREMITIES:  2+ Peripheral Pulses, No clubbing, cyanosis, or edema  PSYCH: AAOx3  NEUROLOGY: non-focal. Functionally quadriplegic  SKIN: Sacral decubitus dressed    LABS:                        9.6    8.57  )-----------( 159      ( 13 Dec 2023 12:05 )             31.8     12-13    139  |  104  |  26<H>  ----------------------------<  167<H>  4.7   |  26  |  <0.30<L>    Ca    9.4      13 Dec 2023 12:05  Phos  4.3     12-13  Mg     1.9     12-13                                     9.5    8.05  )-----------( 130      ( 28 Nov 2023 06:36 )             32.2                7.5    7.48  )-----------( 134      ( 26 Nov 2023 07:27 )             25.6                                    8.6    8.89  )-----------( 126      ( 21 Nov 2023 07:05 )             29.1                8.9    9.19  )-----------( 124      ( 18 Nov 2023 09:15 )             30.4     11-18    137  |  99  |  29<H>  ----------------------------<  224<H>  4.1   |  28  |  <0.30<L>    Ca    10.0      18 Nov 2023 09:15  Mg     1.8     11-18    TPro  7.4  /  Alb  3.3  /  TBili  0.3  /  DBili  x   /  AST  19  /  ALT  18  /  AlkPhos  48  11-18          Urinalysis Basic - ( 18 Nov 2023 09:15 )    Color: x / Appearance: x / SG: x / pH: x  Gluc: 224 mg/dL / Ketone: x  / Bili: x / Urobili: x   Blood: x / Protein: x / Nitrite: x   Leuk Esterase: x / RBC: x / WBC x   Sq Epi: x / Non Sq Epi: x / Bacteria: x        RADIOLOGY & ADDITIONAL TESTS:    Imaging Personally Reviewed:    Consultant(s) Notes Reviewed:      Care Discussed with Consultants/Other Providers:   Patient is a 73y old  Female who presents with a chief complaint of peg tube   DATE OF SERVICE: 12/13/2023      SUBJECTIVE / OVERNIGHT EVENTS: Afebrile.  Blood tinged trach secretions reported  No new symptoms    MEDICATIONS  (STANDING):  albuterol/ipratropium for Nebulization 3 milliLiter(s) Nebulizer every 6 hours  artificial  tears Solution 2 Drop(s) Both EYES four times a day  ascorbic acid 500 milliGRAM(s) Oral daily  bacitracin   Ointment 1 Application(s) Topical two times a day  bisacodyl Suppository 10 milliGRAM(s) Rectal once  calcium carbonate    500 mG (Tums) Chewable 1 Tablet(s) Chew every 12 hours  cephalexin 500 milliGRAM(s) Oral every 6 hours  chlorhexidine 0.12% Liquid 15 milliLiter(s) Oral Mucosa every 12 hours  chlorhexidine 4% Liquid 1 Application(s) Topical <User Schedule>  ciprofloxacin     Tablet 500 milliGRAM(s) Oral every 12 hours  dextrose 50% Injectable 50 milliLiter(s) IV Push once  enoxaparin Injectable 40 milliGRAM(s) SubCutaneous every 24 hours  escitalopram 10 milliGRAM(s) Oral daily  fentaNYL   Patch  25 MICROgram(s)/Hr 1 Patch Transdermal every 72 hours  gabapentin Solution 100 milliGRAM(s) Enteral Tube at bedtime  insulin glargine Injectable (LANTUS) 14 Unit(s) SubCutaneous every morning  insulin lispro (ADMELOG) corrective regimen sliding scale   SubCutaneous every 6 hours  levothyroxine 125 MICROGram(s) Oral <User Schedule>  lidocaine   4% Patch 1 Patch Transdermal daily  melatonin 3 milliGRAM(s) Oral at bedtime  multivitamin/minerals/iron Oral Solution (CENTRUM) 15 milliLiter(s) Oral daily  nystatin Powder 1 Application(s) Topical every 8 hours  oxybutynin 5 milliGRAM(s) Oral daily  pantoprazole   Suspension 40 milliGRAM(s) Enteral Tube daily  petrolatum Ophthalmic Ointment 1 Application(s) Both EYES four times a day  predniSONE   Tablet 5 milliGRAM(s) Oral daily  QUEtiapine 12.5 milliGRAM(s) Oral <User Schedule>  QUEtiapine 12.5 milliGRAM(s) Oral <User Schedule>  senna Syrup 10 milliLiter(s) Oral at bedtime  simethicone 80 milliGRAM(s) Chew four times a day  sodium chloride 3%  Inhalation 4 milliLiter(s) Inhalation every 12 hours  triamcinolone 0.1% Ointment 1 Application(s) Topical two times a day  zinc oxide 40% Paste 1 Application(s) Topical daily  zinc sulfate 220 milliGRAM(s) Oral daily    MEDICATIONS  (PRN):  acetaminophen   Oral Liquid .. 1000 milliGRAM(s) Enteral Tube every 6 hours PRN Temp greater or equal to 38C (100.4F), Mild Pain (1 - 3)  benzocaine 20% Spray 1 Spray(s) Topical four times a day PRN Cough  diphenhydrAMINE Elixir 25 milliGRAM(s) Oral every 6 hours PRN Rash and/or Itching  oxyCODONE    Solution 2.5 milliGRAM(s) Oral every 6 hours PRN Moderate Pain (4 - 6)  sodium chloride 0.65% Nasal 1 Spray(s) Both Nostrils every 6 hours PRN Nasal Congestion                PHYSICAL EXAM:  Vital Signs Last 24 Hrs  T(C): 37.1 (13 Dec 2023 14:45), Max: 37.1 (13 Dec 2023 14:45)  T(F): 98.7 (13 Dec 2023 14:45), Max: 98.7 (13 Dec 2023 14:45)  HR: 86 (13 Dec 2023 13:18) (81 - 95)  BP: 123/55 (13 Dec 2023 13:18) (123/55 - 143/67)  BP(mean): --  RR: 18 (13 Dec 2023 13:18) (15 - 18)  SpO2: 100% (13 Dec 2023 13:18) (98% - 100%)    Parameters below as of 13 Dec 2023 13:18  Patient On (Oxygen Delivery Method): ventilator        GENERAL: NAD, well-developed  HEAD:  Atraumatic, Normocephalic  EYES: EOMI, PERRLA, conjunctiva and sclera clear  NECK: Supple, No JVD. [facility]  CHEST/LUNG: Clear to auscultation bilaterally; No wheeze  HEART: Regular rate and rhythm; No murmurs, rubs, or gallops  ABDOMEN: Soft, Nontender, Nondistended; Bowel sounds present  EXTREMITIES:  2+ Peripheral Pulses, No clubbing, cyanosis, or edema  PSYCH: AAOx3  NEUROLOGY: non-focal. Functionally quadriplegic  SKIN: Sacral decubitus dressed    LABS:                        9.6    8.57  )-----------( 159      ( 13 Dec 2023 12:05 )             31.8     12-13    139  |  104  |  26<H>  ----------------------------<  167<H>  4.7   |  26  |  <0.30<L>    Ca    9.4      13 Dec 2023 12:05  Phos  4.3     12-13  Mg     1.9     12-13                                     9.5    8.05  )-----------( 130      ( 28 Nov 2023 06:36 )             32.2                7.5    7.48  )-----------( 134      ( 26 Nov 2023 07:27 )             25.6                                    8.6    8.89  )-----------( 126      ( 21 Nov 2023 07:05 )             29.1                8.9    9.19  )-----------( 124      ( 18 Nov 2023 09:15 )             30.4     11-18    137  |  99  |  29<H>  ----------------------------<  224<H>  4.1   |  28  |  <0.30<L>    Ca    10.0      18 Nov 2023 09:15  Mg     1.8     11-18    TPro  7.4  /  Alb  3.3  /  TBili  0.3  /  DBili  x   /  AST  19  /  ALT  18  /  AlkPhos  48  11-18          Urinalysis Basic - ( 18 Nov 2023 09:15 )    Color: x / Appearance: x / SG: x / pH: x  Gluc: 224 mg/dL / Ketone: x  / Bili: x / Urobili: x   Blood: x / Protein: x / Nitrite: x   Leuk Esterase: x / RBC: x / WBC x   Sq Epi: x / Non Sq Epi: x / Bacteria: x        RADIOLOGY & ADDITIONAL TESTS:    Imaging Personally Reviewed:    Consultant(s) Notes Reviewed:      Care Discussed with Consultants/Other Providers:

## 2023-12-13 NOTE — PROGRESS NOTE ADULT - TIME BILLING
review of medical record, medical decision making as above, discussion with interdisciplinary team, review of labs, imaging, physical exam review of medical record, medical decision making as above, discussion with interdisciplinary team, review of labs, imaging, physical exam.

## 2023-12-13 NOTE — PROGRESS NOTE ADULT - ASSESSMENT
72 y/o F with PMHx of T2DM, HTN, HLD, anemia, hypothyroidism, RA, fibromyalgia, remote cerebral aneurysm repair, acute hypercapnic respiratory failure now with tracheostomy, PEG tube, and bedbound (trach change 8/18, PEG change 8/14), sacral pressure ulcer, BIBEMS from home for 6 day hx of bleeding from the tracheostomy and PEG tube with associated abdominal pain, recent history of auditory and visual hallucinations, and with chronic sacral decubitus ulcer. Exam notable for mild abdominal distention with LLQ and RLQ tenderness, tracheostomy in place with no obvious bleeding, PEG tube with scant amount of dried blood, at baseline neurologic status. Imaging showing no active bleeding around tracheostomy site, however was notable for mild thickening along PEG tube tract, sacral ulcer extending to the coccyx suggestive of possible osteomyelitis, and bibasilar patchy lung opacities with volume loss. Patient admitted for respiratory support, wound care of tracheostomy and PEG, and management of sacral osteomyelitis. No further Trach and peg site bleeding noted since admission.  Pelvic MRI imaging also suggestive of Acute OM. Patient placed on long-term antibiotics with Infectious disease guidance.  Additionally treated with a 7d course of Cefepime due to PSA and Serratia tracheitis on 11/8-->11/15 and then resumed cipro/keflex.  Hospital course c/b Delirium for which Seroquel was added and home Lexapro continued. Tracheostomy changed to #9 Cuffed [place] by ENT 11/3.  Despite trach change patient remained with persistent air leak.  On 11/19, the trach was again changed due to leak and loss of volumes on vent.  Trach was changed to #8 distal cuffed [place].  Patient seen by Dermatology for back lesions.  One diagnosed as a seborrheic keratitis and the other a dermatofibroma.  Intermittent abdominal pain throughout hospital course, at times relieved with gas/BM, w/u negative, seen by GI - no recs.  12/10 pt completed cipro and keflex for osteo treatment.  Remains medically stable while pending discharge home.      12/11: Adjustment to insulin coverage made per Endocrine. Daughter reporting thick increased secretion, secretions mostly clear and occasionally blood tinge likely assoc with suctioning trauma. Continue current airway clearance measures w/ Chest PT, Duonebs, Hypersal. Remains medically stable for discharge home, will likely provide discharge notice as due to continued LPN shortage. We are unable to provider safe home discharge.    72 y/o F with PMHx of T2DM, HTN, HLD, anemia, hypothyroidism, RA, fibromyalgia, remote cerebral aneurysm repair, acute hypercapnic respiratory failure now with tracheostomy, PEG tube, and bedbound (trach change 8/18, PEG change 8/14), sacral pressure ulcer, BIBEMS from home for 6 day hx of bleeding from the tracheostomy and PEG tube with associated abdominal pain, recent history of auditory and visual hallucinations, and with chronic sacral decubitus ulcer. Exam notable for mild abdominal distention with LLQ and RLQ tenderness, tracheostomy in place with no obvious bleeding, PEG tube with scant amount of dried blood, at baseline neurologic status. Imaging showing no active bleeding around tracheostomy site, however was notable for mild thickening along PEG tube tract, sacral ulcer extending to the coccyx suggestive of possible osteomyelitis, and bibasilar patchy lung opacities with volume loss. Patient admitted for respiratory support, wound care of tracheostomy and PEG, and management of sacral osteomyelitis. No further Trach and peg site bleeding noted since admission.  Pelvic MRI imaging also suggestive of Acute OM. Patient placed on long-term antibiotics with Infectious disease guidance.  Additionally treated with a 7d course of Cefepime due to PSA and Serratia tracheitis on 11/8-->11/15 and then resumed cipro/keflex.  Hospital course c/b Delirium for which Seroquel was added and home Lexapro continued. Tracheostomy changed to #9 Cuffed [place] by ENT 11/3.  Despite trach change patient remained with persistent air leak.  On 11/19, the trach was again changed due to leak and loss of volumes on vent.  Trach was changed to #8 distal cuffed [place].  Patient seen by Dermatology for back lesions.  One diagnosed as a seborrheic keratitis and the other a dermatofibroma.  Intermittent abdominal pain throughout hospital course, at times relieved with gas/BM, w/u negative, seen by GI - no recs.  12/10 pt completed cipro and keflex for osteo treatment.  Remains medically stable while pending discharge home.      12/13: 74 y/o F with PMHx of T2DM, HTN, HLD, anemia, hypothyroidism, RA, fibromyalgia, remote cerebral aneurysm repair, acute hypercapnic respiratory failure now with tracheostomy, PEG tube, and bedbound (trach change 8/18, PEG change 8/14), sacral pressure ulcer, BIBEMS from home for 6 day hx of bleeding from the tracheostomy and PEG tube with associated abdominal pain, recent history of auditory and visual hallucinations, and with chronic sacral decubitus ulcer. Exam notable for mild abdominal distention with LLQ and RLQ tenderness, tracheostomy in place with no obvious bleeding, PEG tube with scant amount of dried blood, at baseline neurologic status. Imaging showing no active bleeding around tracheostomy site, however was notable for mild thickening along PEG tube tract, sacral ulcer extending to the coccyx suggestive of possible osteomyelitis, and bibasilar patchy lung opacities with volume loss. Patient admitted for respiratory support, wound care of tracheostomy and PEG, and management of sacral osteomyelitis. No further Trach and peg site bleeding noted since admission.  Pelvic MRI imaging also suggestive of Acute OM. Patient placed on long-term antibiotics with Infectious disease guidance.  Additionally treated with a 7d course of Cefepime due to PSA and Serratia tracheitis on 11/8-->11/15 and then resumed cipro/keflex.  Hospital course c/b Delirium for which Seroquel was added and home Lexapro continued. Tracheostomy changed to #9 Cuffed [place] by ENT 11/3.  Despite trach change patient remained with persistent air leak.  On 11/19, the trach was again changed due to leak and loss of volumes on vent.  Trach was changed to #8 distal cuffed [place].  Patient seen by Dermatology for back lesions.  One diagnosed as a seborrheic keratitis and the other a dermatofibroma.  Intermittent abdominal pain throughout hospital course, at times relieved with gas/BM, w/u negative, seen by GI - no recs.  12/10 pt completed cipro and keflex for osteo treatment.  Remains medically stable while pending discharge home.      12/13: 72 y/o F with PMHx of T2DM, HTN, HLD, anemia, hypothyroidism, RA, fibromyalgia, remote cerebral aneurysm repair, acute hypercapnic respiratory failure now with tracheostomy, PEG tube, and bedbound (trach change 8/18, PEG change 8/14), sacral pressure ulcer, BIBEMS from home for 6 day hx of bleeding from the tracheostomy and PEG tube with associated abdominal pain, recent history of auditory and visual hallucinations, and with chronic sacral decubitus ulcer. Exam notable for mild abdominal distention with LLQ and RLQ tenderness, tracheostomy in place with no obvious bleeding, PEG tube with scant amount of dried blood, at baseline neurologic status. Imaging showing no active bleeding around tracheostomy site, however was notable for mild thickening along PEG tube tract, sacral ulcer extending to the coccyx suggestive of possible osteomyelitis, and bibasilar patchy lung opacities with volume loss. Patient admitted for respiratory support, wound care of tracheostomy and PEG, and management of sacral osteomyelitis. No further Trach and peg site bleeding noted since admission.  Pelvic MRI imaging also suggestive of Acute OM. Patient placed on long-term antibiotics with Infectious disease guidance.  Additionally treated with a 7d course of Cefepime due to PSA and Serratia tracheitis on 11/8-->11/15 and then resumed cipro/keflex.  Hospital course c/b Delirium for which Seroquel was added and home Lexapro continued. Tracheostomy changed to #9 Cuffed [place] by ENT 11/3.  Despite trach change patient remained with persistent air leak.  On 11/19, the trach was again changed due to leak and loss of volumes on vent.  Trach was changed to #8 distal cuffed [place].  Patient seen by Dermatology for back lesions.  One diagnosed as a seborrheic keratitis and the other a dermatofibroma.  Intermittent abdominal pain throughout hospital course, at times relieved with gas/BM, w/u negative, seen by GI - no recs.  12/10 pt completed cipro and keflex for osteo treatment.  Remains medically stable while pending discharge home.      12/13:  Pt stable.  Spoke with daughter Marysol this am - concerns were addressed.  Pt with bloody sputum from trach during suction likely 2/2 trauma from suctioning - tranexamic acid inhalation x1 and chest xray to be performed - will monitor bleeding. 74 y/o F with PMHx of T2DM, HTN, HLD, anemia, hypothyroidism, RA, fibromyalgia, remote cerebral aneurysm repair, acute hypercapnic respiratory failure now with tracheostomy, PEG tube, and bedbound (trach change 8/18, PEG change 8/14), sacral pressure ulcer, BIBEMS from home for 6 day hx of bleeding from the tracheostomy and PEG tube with associated abdominal pain, recent history of auditory and visual hallucinations, and with chronic sacral decubitus ulcer. Exam notable for mild abdominal distention with LLQ and RLQ tenderness, tracheostomy in place with no obvious bleeding, PEG tube with scant amount of dried blood, at baseline neurologic status. Imaging showing no active bleeding around tracheostomy site, however was notable for mild thickening along PEG tube tract, sacral ulcer extending to the coccyx suggestive of possible osteomyelitis, and bibasilar patchy lung opacities with volume loss. Patient admitted for respiratory support, wound care of tracheostomy and PEG, and management of sacral osteomyelitis. No further Trach and peg site bleeding noted since admission.  Pelvic MRI imaging also suggestive of Acute OM. Patient placed on long-term antibiotics with Infectious disease guidance.  Additionally treated with a 7d course of Cefepime due to PSA and Serratia tracheitis on 11/8-->11/15 and then resumed cipro/keflex.  Hospital course c/b Delirium for which Seroquel was added and home Lexapro continued. Tracheostomy changed to #9 Cuffed [place] by ENT 11/3.  Despite trach change patient remained with persistent air leak.  On 11/19, the trach was again changed due to leak and loss of volumes on vent.  Trach was changed to #8 distal cuffed [place].  Patient seen by Dermatology for back lesions.  One diagnosed as a seborrheic keratitis and the other a dermatofibroma.  Intermittent abdominal pain throughout hospital course, at times relieved with gas/BM, w/u negative, seen by GI - no recs.  12/10 pt completed cipro and keflex for osteo treatment.  12/13 moderate amounts of secretions w/ blood - tranexamic acid neb given.        12/13:  Pt stable.  Spoke with daughter Marysol this am - concerns were addressed.  Pt with bloody sputum from trach during suction likely 2/2 trauma from suctioning - tranexamic acid inhalation x1 and chest xray to be performed - will monitor bleeding.  Daughter aware of plan of care. 72 y/o F with PMHx of T2DM, HTN, HLD, anemia, hypothyroidism, RA, fibromyalgia, remote cerebral aneurysm repair, acute hypercapnic respiratory failure now with tracheostomy, PEG tube, and bedbound (trach change 8/18, PEG change 8/14), sacral pressure ulcer, BIBEMS from home for 6 day hx of bleeding from the tracheostomy and PEG tube with associated abdominal pain, recent history of auditory and visual hallucinations, and with chronic sacral decubitus ulcer. Exam notable for mild abdominal distention with LLQ and RLQ tenderness, tracheostomy in place with no obvious bleeding, PEG tube with scant amount of dried blood, at baseline neurologic status. Imaging showing no active bleeding around tracheostomy site, however was notable for mild thickening along PEG tube tract, sacral ulcer extending to the coccyx suggestive of possible osteomyelitis, and bibasilar patchy lung opacities with volume loss. Patient admitted for respiratory support, wound care of tracheostomy and PEG, and management of sacral osteomyelitis. No further Trach and peg site bleeding noted since admission.  Pelvic MRI imaging also suggestive of Acute OM. Patient placed on long-term antibiotics with Infectious disease guidance.  Additionally treated with a 7d course of Cefepime due to PSA and Serratia tracheitis on 11/8-->11/15 and then resumed cipro/keflex.  Hospital course c/b Delirium for which Seroquel was added and home Lexapro continued. Tracheostomy changed to #9 Cuffed [place] by ENT 11/3.  Despite trach change patient remained with persistent air leak.  On 11/19, the trach was again changed due to leak and loss of volumes on vent.  Trach was changed to #8 distal cuffed [place].  Patient seen by Dermatology for back lesions.  One diagnosed as a seborrheic keratitis and the other a dermatofibroma.  Intermittent abdominal pain throughout hospital course, at times relieved with gas/BM, w/u negative, seen by GI - no recs.  12/10 pt completed cipro and keflex for osteo treatment.  12/13 moderate amounts of secretions w/ blood - tranexamic acid neb given.        12/13:  Pt stable.  Spoke with daughter Marysol this am - concerns were addressed.  Pt with bloody sputum from trach during suction likely 2/2 trauma from suctioning - tranexamic acid inhalation x1 and chest xray to be performed - will monitor bleeding.  Daughter aware of plan of care.

## 2023-12-13 NOTE — PROGRESS NOTE ADULT - SUBJECTIVE AND OBJECTIVE BOX
[facility]-- WOUND TEAM -- FOLLOW UP NOTE  --------------------------------------------------------------------------------    24 hour events/subjective:          Diet:  Diet, NPO with Tube Feed:   Tube Feeding Modality: Gastrostomy  [place] glucose support 1.2  Total Volume for 24 Hours (mL): 1200  Continuous  Starting Tube Feed Rate mL per Hour: 60  Increase Tube Feed Rate by (mL): 0  Until Goal Tube Feed Rate (mL per Hour): 60  Tube Feed Duration (in Hours): 20  Tube Feed Start Time: 06:00  Tube Feed Stop Time: 02:00  Free Water Flush  Pump   Rate (mL per Hour): 10   Frequency: Every 2 Hours  Alfred(7 Gm Arginine/7 Gm Glut/1.2 Gm HMB     Qty per Day:  2 (11-29-23 @ 14:12)      ROS: pt unable to offer    ALLERGIES & MEDICATIONS  --------------------------------------------------------------------------------  Allergies  penicillin (Unknown)  heparin (Unknown)  Tagamet (Unknown)  pineapple (Unknown)  walnut (Unknown)  Andressa Rawls Hazelcharlene (Unknown)  Lyrica (Unknown)      STANDING INPATIENT MEDICATIONS  albuterol/ipratropium for Nebulization 3 milliLiter(s) Nebulizer every 6 hours  amLODIPine   Tablet 10 milliGRAM(s) Oral daily  artificial  tears Solution 2 Drop(s) Both EYES four times a day  ascorbic acid 500 milliGRAM(s) Oral daily  calcium carbonate    500 mG (Tums) Chewable 1 Tablet(s) Chew every 12 hours  chlorhexidine 0.12% Liquid 15 milliLiter(s) Oral Mucosa every 12 hours  chlorhexidine 4% Liquid 1 Application(s) Topical <User Schedule>  dextrose 50% Injectable 12.5 Gram(s) IV Push once  dextrose 50% Injectable 25 Gram(s) IV Push once  dextrose 50% Injectable 50 milliLiter(s) IV Push once  escitalopram 10 milliGRAM(s) Oral <User Schedule>  fentaNYL   Patch  25 MICROgram(s)/Hr 1 Patch Transdermal every 72 hours  gabapentin Solution 100 milliGRAM(s) Enteral Tube at bedtime  glucagon  Injectable 1 milliGRAM(s) IntraMuscular once  hemorrhoidal Ointment      hemorrhoidal Ointment 1 Application(s) Rectal daily  insulin glargine Injectable (LANTUS) 18 Unit(s) SubCutaneous every morning  insulin lispro (ADMELOG) corrective regimen sliding scale   SubCutaneous every 6 hours  insulin regular  human recombinant 24 Unit(s) SubCutaneous <User Schedule>  insulin regular  human recombinant 9 Unit(s) SubCutaneous <User Schedule>  insulin regular  human recombinant 11 Unit(s) SubCutaneous <User Schedule>  levothyroxine 125 MICROGram(s) Oral <User Schedule>  lidocaine   4% Patch 1 Patch Transdermal daily  melatonin 1 milliGRAM(s) Oral at bedtime  melatonin 5 milliGRAM(s) Oral at bedtime  multivitamin/minerals/iron Oral Solution (CENTRUM) 15 milliLiter(s) Oral daily  nystatin Powder 1 Application(s) Topical every 8 hours  pantoprazole   Suspension 40 milliGRAM(s) Enteral Tube <User Schedule>  petrolatum Ophthalmic Ointment 1 Application(s) Both EYES four times a day  predniSONE   Tablet 5 milliGRAM(s) Oral daily  QUEtiapine 12.5 milliGRAM(s) Oral <User Schedule>  simethicone 80 milliGRAM(s) Chew four times a day  sodium chloride 3%  Inhalation 4 milliLiter(s) Inhalation every 12 hours  tranexamic acid Injectable for Nebulization 500 milliGRAM(s) Inhalation once  zinc oxide 40% Paste 1 Application(s) Topical daily      PRN INPATIENT MEDICATION  acetaminophen   Oral Liquid .. 1000 milliGRAM(s) Enteral Tube every 6 hours PRN  benzocaine 20% Spray 1 Spray(s) Topical four times a day PRN  diclofenac sodium 1% Gel 2 Gram(s) Topical four times a day PRN  diphenhydrAMINE Elixir 25 milliGRAM(s) Oral every 6 hours PRN  guaifenesin/dextromethorphan Oral Liquid 10 milliLiter(s) Oral every 6 hours PRN  sodium chloride 0.65% Nasal 1 Spray(s) Both Nostrils every 6 hours PRN        VITALS/PHYSICAL EXAM  --------------------------------------------------------------------------------  T(C): 36.8 (12-13-23 @ 04:22), Max: 37.1 (12-12-23 @ 14:10)  HR: 86 (12-13-23 @ 13:18) (78 - 95)  BP: 123/55 (12-13-23 @ 13:18) (123/55 - 143/67)  RR: 18 (12-13-23 @ 13:18) (14 - 18)  SpO2: 100% (12-13-23 @ 13:18) (98% - 100%)  Wt(kg): --              LABS/ CULTURES/ RADIOLOGY:              9.6    8.57  >-----------<  159      [12-13-23 @ 12:05]              31.8     139  |  104  |  26  ----------------------------<  167      [12-13-23 @ 12:05]  4.7   |  26  |  <0.30        Ca     9.4     [12-13-23 @ 12:05]      Mg     1.9     [12-13-23 @ 12:05]      Phos  4.3     [12-13-23 @ 12:05]          CAPILLARY BLOOD GLUCOSE  POCT Blood Glucose.: 186 mg/dL (13 Dec 2023 11:54)  POCT Blood Glucose.: 164 mg/dL (13 Dec 2023 05:20)  POCT Blood Glucose.: 124 mg/dL (13 Dec 2023 00:06)  POCT Blood Glucose.: 127 mg/dL (12 Dec 2023 18:12)      A1C with Estimated Average Glucose Result: 7.5 % (10-28-23 @ 07:18)   [facility]-- WOUND TEAM -- FOLLOW UP NOTE  --------------------------------------------------------------------------------    24 hour events/subjective:          Diet:  Diet, NPO with Tube Feed:   Tube Feeding Modality: Gastrostomy  [place] glucose support 1.2  Total Volume for 24 Hours (mL): 1200  Continuous  Starting Tube Feed Rate mL per Hour: 60  Increase Tube Feed Rate by (mL): 0  Until Goal Tube Feed Rate (mL per Hour): 60  Tube Feed Duration (in Hours): 20  Tube Feed Start Time: 06:00  Tube Feed Stop Time: 02:00  Free Water Flush  Pump   Rate (mL per Hour): 10   Frequency: Every 2 Hours  Alfred(7 Gm Arginine/7 Gm Glut/1.2 Gm HMB     Qty per Day:  2 (11-29-23 @ 14:12)      ROS: pt unable to offer    ALLERGIES & MEDICATIONS  --------------------------------------------------------------------------------  Allergies  penicillin (Unknown)  heparin (Unknown)  Tagamet (Unknown)  pineapple (Unknown)  walnut (Unknown)  Andressa Rawls Hazelcharlene (Unknown)  Lyrica (Unknown)      STANDING INPATIENT MEDICATIONS  albuterol/ipratropium for Nebulization 3 milliLiter(s) Nebulizer every 6 hours  amLODIPine   Tablet 10 milliGRAM(s) Oral daily  artificial  tears Solution 2 Drop(s) Both EYES four times a day  ascorbic acid 500 milliGRAM(s) Oral daily  calcium carbonate    500 mG (Tums) Chewable 1 Tablet(s) Chew every 12 hours  chlorhexidine 0.12% Liquid 15 milliLiter(s) Oral Mucosa every 12 hours  chlorhexidine 4% Liquid 1 Application(s) Topical <User Schedule>  dextrose 50% Injectable 12.5 Gram(s) IV Push once  dextrose 50% Injectable 25 Gram(s) IV Push once  dextrose 50% Injectable 50 milliLiter(s) IV Push once  escitalopram 10 milliGRAM(s) Oral <User Schedule>  fentaNYL   Patch  25 MICROgram(s)/Hr 1 Patch Transdermal every 72 hours  gabapentin Solution 100 milliGRAM(s) Enteral Tube at bedtime  glucagon  Injectable 1 milliGRAM(s) IntraMuscular once  hemorrhoidal Ointment      hemorrhoidal Ointment 1 Application(s) Rectal daily  insulin glargine Injectable (LANTUS) 18 Unit(s) SubCutaneous every morning  insulin lispro (ADMELOG) corrective regimen sliding scale   SubCutaneous every 6 hours  insulin regular  human recombinant 24 Unit(s) SubCutaneous <User Schedule>  insulin regular  human recombinant 9 Unit(s) SubCutaneous <User Schedule>  insulin regular  human recombinant 11 Unit(s) SubCutaneous <User Schedule>  levothyroxine 125 MICROGram(s) Oral <User Schedule>  lidocaine   4% Patch 1 Patch Transdermal daily  melatonin 1 milliGRAM(s) Oral at bedtime  melatonin 5 milliGRAM(s) Oral at bedtime  multivitamin/minerals/iron Oral Solution (CENTRUM) 15 milliLiter(s) Oral daily  nystatin Powder 1 Application(s) Topical every 8 hours  pantoprazole   Suspension 40 milliGRAM(s) Enteral Tube <User Schedule>  petrolatum Ophthalmic Ointment 1 Application(s) Both EYES four times a day  predniSONE   Tablet 5 milliGRAM(s) Oral daily  QUEtiapine 12.5 milliGRAM(s) Oral <User Schedule>  simethicone 80 milliGRAM(s) Chew four times a day  sodium chloride 3%  Inhalation 4 milliLiter(s) Inhalation every 12 hours  tranexamic acid Injectable for Nebulization 500 milliGRAM(s) Inhalation once  zinc oxide 40% Paste 1 Application(s) Topical daily      PRN INPATIENT MEDICATION  acetaminophen   Oral Liquid .. 1000 milliGRAM(s) Enteral Tube every 6 hours PRN  benzocaine 20% Spray 1 Spray(s) Topical four times a day PRN  diclofenac sodium 1% Gel 2 Gram(s) Topical four times a day PRN  diphenhydrAMINE Elixir 25 milliGRAM(s) Oral every 6 hours PRN  guaifenesin/dextromethorphan Oral Liquid 10 milliLiter(s) Oral every 6 hours PRN  sodium chloride 0.65% Nasal 1 Spray(s) Both Nostrils every 6 hours PRN        VITALS/PHYSICAL EXAM  --------------------------------------------------------------------------------  T(C): 36.8 (12-13-23 @ 04:22), Max: 37.1 (12-12-23 @ 14:10)  HR: 86 (12-13-23 @ 13:18) (78 - 95)  BP: 123/55 (12-13-23 @ 13:18) (123/55 - 143/67)  RR: 18 (12-13-23 @ 13:18) (14 - 18)  SpO2: 100% (12-13-23 @ 13:18) (98% - 100%)  Wt(kg): --              LABS/ CULTURES/ RADIOLOGY:              9.6    8.57  >-----------<  159      [12-13-23 @ 12:05]              31.8     139  |  104  |  26  ----------------------------<  167      [12-13-23 @ 12:05]  4.7   |  26  |  <0.30        Ca     9.4     [12-13-23 @ 12:05]      Mg     1.9     [12-13-23 @ 12:05]      Phos  4.3     [12-13-23 @ 12:05]          CAPILLARY BLOOD GLUCOSE  POCT Blood Glucose.: 186 mg/dL (13 Dec 2023 11:54)  POCT Blood Glucose.: 164 mg/dL (13 Dec 2023 05:20)  POCT Blood Glucose.: 124 mg/dL (13 Dec 2023 00:06)  POCT Blood Glucose.: 127 mg/dL (12 Dec 2023 18:12)      A1C with Estimated Average Glucose Result: 7.5 % (10-28-23 @ 07:18)   [facility]-- WOUND TEAM -- FOLLOW UP NOTE  --------------------------------------------------------------------------------    24 hour events/subjective:      afebrile  more alert  tolerating TF w/o vomiting  tolerating dressings     no odor pain excess drainage  incontinent-       Diet:  Diet, NPO with Tube Feed:   Tube Feeding Modality: Gastrostomy  [place] glucose support 1.2  Total Volume for 24 Hours (mL): 1200  Continuous  Starting Tube Feed Rate mL per Hour: 60  Increase Tube Feed Rate by (mL): 0  Until Goal Tube Feed Rate (mL per Hour): 60  Tube Feed Duration (in Hours): 20  Tube Feed Start Time: 06:00  Tube Feed Stop Time: 02:00  Free Water Flush  Pump   Rate (mL per Hour): 10   Frequency: Every 2 Hours  Alfred(7 Gm Arginine/7 Gm Glut/1.2 Gm HMB     Qty per Day:  2 (11-29-23 @ 14:12)      ROS: pt unable to offer    ALLERGIES & MEDICATIONS  --------------------------------------------------------------------------------  Allergies  penicillin (Unknown)  heparin (Unknown)  Tagamet (Unknown)  pineapple (Unknown)  walnut (Unknown)  Andressa Rawls Hazelcharlene (Unknown)  Lyrica (Unknown)      STANDING INPATIENT MEDICATIONS  albuterol/ipratropium for Nebulization 3 milliLiter(s) Nebulizer every 6 hours  amLODIPine   Tablet 10 milliGRAM(s) Oral daily  artificial  tears Solution 2 Drop(s) Both EYES four times a day  ascorbic acid 500 milliGRAM(s) Oral daily  calcium carbonate    500 mG (Tums) Chewable 1 Tablet(s) Chew every 12 hours  chlorhexidine 0.12% Liquid 15 milliLiter(s) Oral Mucosa every 12 hours  chlorhexidine 4% Liquid 1 Application(s) Topical <User Schedule>  dextrose 50% Injectable 12.5 Gram(s) IV Push once  dextrose 50% Injectable 25 Gram(s) IV Push once  dextrose 50% Injectable 50 milliLiter(s) IV Push once  escitalopram 10 milliGRAM(s) Oral <User Schedule>  fentaNYL   Patch  25 MICROgram(s)/Hr 1 Patch Transdermal every 72 hours  gabapentin Solution 100 milliGRAM(s) Enteral Tube at bedtime  glucagon  Injectable 1 milliGRAM(s) IntraMuscular once  hemorrhoidal Ointment      hemorrhoidal Ointment 1 Application(s) Rectal daily  insulin glargine Injectable (LANTUS) 18 Unit(s) SubCutaneous every morning  insulin lispro (ADMELOG) corrective regimen sliding scale   SubCutaneous every 6 hours  insulin regular  human recombinant 24 Unit(s) SubCutaneous <User Schedule>  insulin regular  human recombinant 9 Unit(s) SubCutaneous <User Schedule>  insulin regular  human recombinant 11 Unit(s) SubCutaneous <User Schedule>  levothyroxine 125 MICROGram(s) Oral <User Schedule>  lidocaine   4% Patch 1 Patch Transdermal daily  melatonin 1 milliGRAM(s) Oral at bedtime  melatonin 5 milliGRAM(s) Oral at bedtime  multivitamin/minerals/iron Oral Solution (CENTRUM) 15 milliLiter(s) Oral daily  nystatin Powder 1 Application(s) Topical every 8 hours  pantoprazole   Suspension 40 milliGRAM(s) Enteral Tube <User Schedule>  petrolatum Ophthalmic Ointment 1 Application(s) Both EYES four times a day  predniSONE   Tablet 5 milliGRAM(s) Oral daily  QUEtiapine 12.5 milliGRAM(s) Oral <User Schedule>  simethicone 80 milliGRAM(s) Chew four times a day  sodium chloride 3%  Inhalation 4 milliLiter(s) Inhalation every 12 hours  tranexamic acid Injectable for Nebulization 500 milliGRAM(s) Inhalation once  zinc oxide 40% Paste 1 Application(s) Topical daily      PRN INPATIENT MEDICATION  acetaminophen   Oral Liquid .. 1000 milliGRAM(s) Enteral Tube every 6 hours PRN  benzocaine 20% Spray 1 Spray(s) Topical four times a day PRN  diclofenac sodium 1% Gel 2 Gram(s) Topical four times a day PRN  diphenhydrAMINE Elixir 25 milliGRAM(s) Oral every 6 hours PRN  guaifenesin/dextromethorphan Oral Liquid 10 milliLiter(s) Oral every 6 hours PRN  sodium chloride 0.65% Nasal 1 Spray(s) Both Nostrils every 6 hours PRN        VITALS/PHYSICAL EXAM  --------------------------------------------------------------------------------  T(C): 36.8 (12-13-23 @ 04:22), Max: 37.1 (12-12-23 @ 14:10)  HR: 86 (12-13-23 @ 13:18) (78 - 95)  BP: 123/55 (12-13-23 @ 13:18) (123/55 - 143/67)  RR: 18 (12-13-23 @ 13:18) (14 - 18)  SpO2: 100% (12-13-23 @ 13:18) (98% - 100%)  Wt(kg): --    NAD,  Alert, frail,  WD/ WN/ WG  Total Care Sport bed  HEENT:  NC/AT, EOMI, sclera clear, mucosa moist, throat clear, trach       w/o secretions or peritrach skin irration  Gastrointestinal: soft NT/ND (+)PEG w/o drainage or skin irration  : (+) primafit  Neurology:  nonverbal, no follow commands, paraplegic  Psych: calm/ appropriate  Musculoskeletal:  pROM, no deformities/ contractures   Vascular: BLE equally warm,  no cyanosis, clubbing nor acute ischemia  Skin:  moist w/ good turgor  Sacral stage 4 pressure injury  4cm x 2cm x 0.5cm   moist granular wound bed   palpable but not exposed bone  no blistering   (+) serosanguinous drainage  No odor, erythema, increased warmth, tenderness, induration, fluctuance, nor crepitus      LABS/ CULTURES/ RADIOLOGY:              9.6    8.57  >-----------<  159      [12-13-23 @ 12:05]              31.8     139  |  104  |  26  ----------------------------<  167      [12-13-23 @ 12:05]  4.7   |  26  |  <0.30        Ca     9.4     [12-13-23 @ 12:05]      Mg     1.9     [12-13-23 @ 12:05]      Phos  4.3     [12-13-23 @ 12:05]      CAPILLARY BLOOD GLUCOSE  POCT Blood Glucose.: 186 mg/dL (13 Dec 2023 11:54)  POCT Blood Glucose.: 164 mg/dL (13 Dec 2023 05:20)  POCT Blood Glucose.: 124 mg/dL (13 Dec 2023 00:06)  POCT Blood Glucose.: 127 mg/dL (12 Dec 2023 18:12)      A1C with Estimated Average Glucose Result: 7.5 % (10-28-23 @ 07:18)   [facility]-- WOUND TEAM -- FOLLOW UP NOTE  --------------------------------------------------------------------------------    24 hour events/subjective:      afebrile  more alert  tolerating TF w/o vomiting  tolerating dressings     no odor pain excess drainage  incontinent-       Diet:  Diet, NPO with Tube Feed:   Tube Feeding Modality: Gastrostomy  [email or web address] glucose support 1.2  Total Volume for 24 Hours (mL): 1200  Continuous  Starting Tube Feed Rate mL per Hour: 60  Increase Tube Feed Rate by (mL): 0  Until Goal Tube Feed Rate (mL per Hour): 60  Tube Feed Duration (in Hours): 20  Tube Feed Start Time: 06:00  Tube Feed Stop Time: 02:00  Free Water Flush  Pump   Rate (mL per Hour): 10   Frequency: Every 2 Hours  Alfred(7 Gm Arginine/7 Gm Glut/1.2 Gm HMB     Qty per Day:  2 (11-29-23 @ 14:12)      ROS: pt unable to offer    ALLERGIES & MEDICATIONS  --------------------------------------------------------------------------------  Allergies  penicillin (Unknown)  heparin (Unknown)  Tagamet (Unknown)  pineapple (Unknown)  walnut (Unknown)  Andressa Rawls Hazelcharlene (Unknown)  Lyrica (Unknown)      STANDING INPATIENT MEDICATIONS  albuterol/ipratropium for Nebulization 3 milliLiter(s) Nebulizer every 6 hours  amLODIPine   Tablet 10 milliGRAM(s) Oral daily  artificial  tears Solution 2 Drop(s) Both EYES four times a day  ascorbic acid 500 milliGRAM(s) Oral daily  calcium carbonate    500 mG (Tums) Chewable 1 Tablet(s) Chew every 12 hours  chlorhexidine 0.12% Liquid 15 milliLiter(s) Oral Mucosa every 12 hours  chlorhexidine 4% Liquid 1 Application(s) Topical <User Schedule>  dextrose 50% Injectable 12.5 Gram(s) IV Push once  dextrose 50% Injectable 25 Gram(s) IV Push once  dextrose 50% Injectable 50 milliLiter(s) IV Push once  escitalopram 10 milliGRAM(s) Oral <User Schedule>  fentaNYL   Patch  25 MICROgram(s)/Hr 1 Patch Transdermal every 72 hours  gabapentin Solution 100 milliGRAM(s) Enteral Tube at bedtime  glucagon  Injectable 1 milliGRAM(s) IntraMuscular once  hemorrhoidal Ointment      hemorrhoidal Ointment 1 Application(s) Rectal daily  insulin glargine Injectable (LANTUS) 18 Unit(s) SubCutaneous every morning  insulin lispro (ADMELOG) corrective regimen sliding scale   SubCutaneous every 6 hours  insulin regular  human recombinant 24 Unit(s) SubCutaneous <User Schedule>  insulin regular  human recombinant 9 Unit(s) SubCutaneous <User Schedule>  insulin regular  human recombinant 11 Unit(s) SubCutaneous <User Schedule>  levothyroxine 125 MICROGram(s) Oral <User Schedule>  lidocaine   4% Patch 1 Patch Transdermal daily  melatonin 1 milliGRAM(s) Oral at bedtime  melatonin 5 milliGRAM(s) Oral at bedtime  multivitamin/minerals/iron Oral Solution (CENTRUM) 15 milliLiter(s) Oral daily  nystatin Powder 1 Application(s) Topical every 8 hours  pantoprazole   Suspension 40 milliGRAM(s) Enteral Tube <User Schedule>  petrolatum Ophthalmic Ointment 1 Application(s) Both EYES four times a day  predniSONE   Tablet 5 milliGRAM(s) Oral daily  QUEtiapine 12.5 milliGRAM(s) Oral <User Schedule>  simethicone 80 milliGRAM(s) Chew four times a day  sodium chloride 3%  Inhalation 4 milliLiter(s) Inhalation every 12 hours  tranexamic acid Injectable for Nebulization 500 milliGRAM(s) Inhalation once  zinc oxide 40% Paste 1 Application(s) Topical daily      PRN INPATIENT MEDICATION  acetaminophen   Oral Liquid .. 1000 milliGRAM(s) Enteral Tube every 6 hours PRN  benzocaine 20% Spray 1 Spray(s) Topical four times a day PRN  diclofenac sodium 1% Gel 2 Gram(s) Topical four times a day PRN  diphenhydrAMINE Elixir 25 milliGRAM(s) Oral every 6 hours PRN  guaifenesin/dextromethorphan Oral Liquid 10 milliLiter(s) Oral every 6 hours PRN  sodium chloride 0.65% Nasal 1 Spray(s) Both Nostrils every 6 hours PRN        VITALS/PHYSICAL EXAM  --------------------------------------------------------------------------------  T(C): 36.8 (12-13-23 @ 04:22), Max: 37.1 (12-12-23 @ 14:10)  HR: 86 (12-13-23 @ 13:18) (78 - 95)  BP: 123/55 (12-13-23 @ 13:18) (123/55 - 143/67)  RR: 18 (12-13-23 @ 13:18) (14 - 18)  SpO2: 100% (12-13-23 @ 13:18) (98% - 100%)  Wt(kg): --    NAD,  Alert, frail,  WD/ WN/ WG  Total Care Sport bed  HEENT:  NC/AT, EOMI, sclera clear, mucosa moist, throat clear, trach       w/o secretions or peritrach skin irration  Gastrointestinal: soft NT/ND (+)PEG w/o drainage or skin irration  : (+) primafit  Neurology:  nonverbal, no follow commands, paraplegic  Psych: calm/ appropriate  Musculoskeletal:  pROM, no deformities/ contractures   Vascular: BLE equally warm,  no cyanosis, clubbing nor acute ischemia  Skin:  moist w/ good turgor  Sacral stage 4 pressure injury  4cm x 2cm x 0.5cm   moist granular wound bed   palpable but not exposed bone  no blistering   (+) serosanguinous drainage  No odor, erythema, increased warmth, tenderness, induration, fluctuance, nor crepitus      LABS/ CULTURES/ RADIOLOGY:              9.6    8.57  >-----------<  159      [12-13-23 @ 12:05]              31.8     139  |  104  |  26  ----------------------------<  167      [12-13-23 @ 12:05]  4.7   |  26  |  <0.30        Ca     9.4     [12-13-23 @ 12:05]      Mg     1.9     [12-13-23 @ 12:05]      Phos  4.3     [12-13-23 @ 12:05]      CAPILLARY BLOOD GLUCOSE  POCT Blood Glucose.: 186 mg/dL (13 Dec 2023 11:54)  POCT Blood Glucose.: 164 mg/dL (13 Dec 2023 05:20)  POCT Blood Glucose.: 124 mg/dL (13 Dec 2023 00:06)  POCT Blood Glucose.: 127 mg/dL (12 Dec 2023 18:12)      A1C with Estimated Average Glucose Result: 7.5 % (10-28-23 @ 07:18)

## 2023-12-13 NOTE — PROGRESS NOTE ADULT - SUBJECTIVE AND OBJECTIVE BOX
Patient is a 73y old  Female who presents with a chief complaint of bleeding (04 Dec 2023 16:57)      Interval Events:    REVIEW OF SYSTEMS:  [ ] Positive  [ ] All other systems negative  [ ] Unable to assess ROS because ________    Vital Signs Last 24 Hrs  T(C): 36.8 (12-13-23 @ 04:22), Max: 37.1 (12-12-23 @ 14:10)  T(F): 98.2 (12-13-23 @ 04:22), Max: 98.7 (12-12-23 @ 14:10)  HR: 89 (12-13-23 @ 05:30) (76 - 95)  BP: 143/67 (12-13-23 @ 04:22) (126/53 - 143/67)  RR: 15 (12-13-23 @ 04:22) (14 - 18)  SpO2: 99% (12-13-23 @ 05:30) (99% - 100%)    PHYSICAL EXAM:  HEENT:   [ ]Tracheostomy:  [ ]Pupils equal  [ ]No oral lesions  [ ]Abnormal    SKIN  [ ]No Rash  [ ] Abnormal  [ ] pressure    CARDIAC  [ ]Regular  [ ]Abnormal    PULMONARY  [ ]Bilateral Clear Breath Sounds  [ ]Normal Excursion  [ ]Abnormal    GI  [ ]PEG      [ ] +BS		              [ ]Soft, nondistended, nontender	  [ ]Abnormal    MUSCULOSKELETAL                                   [ ]Bedbound                 [ ]Abnormal    [ ]Ambulatory/OOB to chair                           EXTREMITIES                                         [ ]Normal  [ ]Edema                           NEUROLOGIC  [ ] Normal, non focal  [ ] Focal findings:    PSYCHIATRIC  [ ]Alert and appropriate  [ ] Sedated	 [ ]Agitated    :  Mcbride: [ ] Yes, if yes: Date of Placement:                   [  ] No    LINES: Central Lines [ ] Yes, if yes: Date of Placement                                     [  ] No    HOSPITAL MEDICATIONS:  MEDICATIONS  (STANDING):  albuterol/ipratropium for Nebulization 3 milliLiter(s) Nebulizer every 6 hours  amLODIPine   Tablet 10 milliGRAM(s) Oral daily  artificial  tears Solution 2 Drop(s) Both EYES four times a day  ascorbic acid 500 milliGRAM(s) Oral daily  calcium carbonate    500 mG (Tums) Chewable 1 Tablet(s) Chew every 12 hours  chlorhexidine 0.12% Liquid 15 milliLiter(s) Oral Mucosa every 12 hours  chlorhexidine 4% Liquid 1 Application(s) Topical <User Schedule>  dextrose 50% Injectable 12.5 Gram(s) IV Push once  dextrose 50% Injectable 50 milliLiter(s) IV Push once  dextrose 50% Injectable 25 Gram(s) IV Push once  escitalopram 10 milliGRAM(s) Oral <User Schedule>  gabapentin Solution 100 milliGRAM(s) Enteral Tube at bedtime  glucagon  Injectable 1 milliGRAM(s) IntraMuscular once  hemorrhoidal Ointment      hemorrhoidal Ointment 1 Application(s) Rectal daily  insulin glargine Injectable (LANTUS) 18 Unit(s) SubCutaneous every morning  insulin lispro (ADMELOG) corrective regimen sliding scale   SubCutaneous every 6 hours  insulin regular  human recombinant 11 Unit(s) SubCutaneous <User Schedule>  insulin regular  human recombinant 9 Unit(s) SubCutaneous <User Schedule>  insulin regular  human recombinant 24 Unit(s) SubCutaneous <User Schedule>  levothyroxine 125 MICROGram(s) Oral <User Schedule>  lidocaine   4% Patch 1 Patch Transdermal daily  melatonin 5 milliGRAM(s) Oral at bedtime  melatonin 1 milliGRAM(s) Oral at bedtime  multivitamin/minerals/iron Oral Solution (CENTRUM) 15 milliLiter(s) Oral daily  nystatin Powder 1 Application(s) Topical every 8 hours  pantoprazole   Suspension 40 milliGRAM(s) Enteral Tube <User Schedule>  petrolatum Ophthalmic Ointment 1 Application(s) Both EYES four times a day  predniSONE   Tablet 5 milliGRAM(s) Oral daily  QUEtiapine 12.5 milliGRAM(s) Oral <User Schedule>  simethicone 80 milliGRAM(s) Chew four times a day  sodium chloride 3%  Inhalation 4 milliLiter(s) Inhalation every 12 hours  zinc oxide 40% Paste 1 Application(s) Topical daily    MEDICATIONS  (PRN):  acetaminophen   Oral Liquid .. 1000 milliGRAM(s) Enteral Tube every 6 hours PRN Temp greater or equal to 38C (100.4F), Mild Pain (1 - 3)  benzocaine 20% Spray 1 Spray(s) Topical four times a day PRN Cough  diclofenac sodium 1% Gel 2 Gram(s) Topical four times a day PRN pain  diphenhydrAMINE Elixir 25 milliGRAM(s) Oral every 6 hours PRN Rash and/or Itching  guaifenesin/dextromethorphan Oral Liquid 10 milliLiter(s) Oral every 6 hours PRN Cough  sodium chloride 0.65% Nasal 1 Spray(s) Both Nostrils every 6 hours PRN Nasal Congestion      LABS:                  CAPILLARY BLOOD GLUCOSE    MICROBIOLOGY:     RADIOLOGY:  [ ] Reviewed and interpreted by me    Mode: AC/ CMV (Assist Control/ Continuous Mandatory Ventilation)  RR (machine): 12  TV (machine): 300  FiO2: 30  PEEP: 5  ITime: 1  MAP: 9  PIP: 28   Patient is a 73y old  Female who presents with a chief complaint of bleeding (04 Dec 2023 16:57)      Interval Events:    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    CARDIAC  [X]Regular  [ ]Abnormal    PULMONARY  [ ]Bilateral Clear Breath Sounds  [ ]Normal Excursion  [X]Abnormal - BL Coarse BS    GI  [X]PEG      [X] +BS		              [X]Soft, nondistended, nontender	  [ ]Abnormal    MUSCULOSKELETAL                                   [X]Bedbound                 [ ]Abnormal    [ ]Ambulatory/OOB to chair                           EXTREMITIES                                         [X]Normal  [ ]Edema                           NEUROLOGIC  [ ] Normal, non focal  [X] Focal findings: opens eyes spontaneously, follows commands on all 4 extremities, able to mouth words    PSYCHIATRIC  [X]Alert and appropriate  [ ] Sedated	 [ ]Agitated    :  Mcbride: [ ] Yes, if yes: Date of Placement:                   [X] No    LINES: Central Lines [ ] Yes, if yes: Date of Placement                                     [X] No    HOSPITAL MEDICATIONS:  MEDICATIONS  (STANDING):  albuterol/ipratropium for Nebulization 3 milliLiter(s) Nebulizer every 6 hours  amLODIPine   Tablet 10 milliGRAM(s) Oral daily  artificial  tears Solution 2 Drop(s) Both EYES four times a day  ascorbic acid 500 milliGRAM(s) Oral daily  calcium carbonate    500 mG (Tums) Chewable 1 Tablet(s) Chew every 12 hours  chlorhexidine 0.12% Liquid 15 milliLiter(s) Oral Mucosa every 12 hours  chlorhexidine 4% Liquid 1 Application(s) Topical <User Schedule>  dextrose 50% Injectable 12.5 Gram(s) IV Push once  dextrose 50% Injectable 50 milliLiter(s) IV Push once  dextrose 50% Injectable 25 Gram(s) IV Push once  escitalopram 10 milliGRAM(s) Oral <User Schedule>  gabapentin Solution 100 milliGRAM(s) Enteral Tube at bedtime  glucagon  Injectable 1 milliGRAM(s) IntraMuscular once  hemorrhoidal Ointment      hemorrhoidal Ointment 1 Application(s) Rectal daily  insulin glargine Injectable (LANTUS) 18 Unit(s) SubCutaneous every morning  insulin lispro (ADMELOG) corrective regimen sliding scale   SubCutaneous every 6 hours  insulin regular  human recombinant 11 Unit(s) SubCutaneous <User Schedule>  insulin regular  human recombinant 9 Unit(s) SubCutaneous <User Schedule>  insulin regular  human recombinant 24 Unit(s) SubCutaneous <User Schedule>  levothyroxine 125 MICROGram(s) Oral <User Schedule>  lidocaine   4% Patch 1 Patch Transdermal daily  melatonin 5 milliGRAM(s) Oral at bedtime  melatonin 1 milliGRAM(s) Oral at bedtime  multivitamin/minerals/iron Oral Solution (CENTRUM) 15 milliLiter(s) Oral daily  nystatin Powder 1 Application(s) Topical every 8 hours  pantoprazole   Suspension 40 milliGRAM(s) Enteral Tube <User Schedule>  petrolatum Ophthalmic Ointment 1 Application(s) Both EYES four times a day  predniSONE   Tablet 5 milliGRAM(s) Oral daily  QUEtiapine 12.5 milliGRAM(s) Oral <User Schedule>  simethicone 80 milliGRAM(s) Chew four times a day  sodium chloride 3%  Inhalation 4 milliLiter(s) Inhalation every 12 hours  zinc oxide 40% Paste 1 Application(s) Topical daily    MEDICATIONS  (PRN):  acetaminophen   Oral Liquid .. 1000 milliGRAM(s) Enteral Tube every 6 hours PRN Temp greater or equal to 38C (100.4F), Mild Pain (1 - 3)  benzocaine 20% Spray 1 Spray(s) Topical four times a day PRN Cough  diclofenac sodium 1% Gel 2 Gram(s) Topical four times a day PRN pain  diphenhydrAMINE Elixir 25 milliGRAM(s) Oral every 6 hours PRN Rash and/or Itching  guaifenesin/dextromethorphan Oral Liquid 10 milliLiter(s) Oral every 6 hours PRN Cough  sodium chloride 0.65% Nasal 1 Spray(s) Both Nostrils every 6 hours PRN Nasal Congestion      LABS:                  CAPILLARY BLOOD GLUCOSE    MICROBIOLOGY:     RADIOLOGY:  [ ] Reviewed and interpreted by me    Mode: AC/ CMV (Assist Control/ Continuous Mandatory Ventilation)  RR (machine): 12  TV (machine): 300  FiO2: 30  PEEP: 5  ITime: 1  MAP: 9  PIP: 28   Patient is a 73y old  Female who presents with a chief complaint of bleeding (04 Dec 2023 16:57)      Interval Events:    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    CARDIAC  [X]Regular  [ ]Abnormal    PULMONARY  [ ]Bilateral Clear Breath Sounds  [ ]Normal Excursion  [X]Abnormal - BL Coarse BS, blood tinged sputum     GI  [X]PEG      [X] +BS		              [X]Soft, nondistended, nontender	  [ ]Abnormal    MUSCULOSKELETAL                                   [X]Bedbound                 [ ]Abnormal    [ ]Ambulatory/OOB to chair                           EXTREMITIES                                         [X]Normal  [ ]Edema                           NEUROLOGIC  [ ] Normal, non focal  [X] Focal findings: opens eyes spontaneously, follows commands on all 4 extremities, able to mouth words    PSYCHIATRIC  [X]Alert and appropriate  [ ] Sedated	 [ ]Agitated    :  Mcbride: [ ] Yes, if yes: Date of Placement:                   [X] No    LINES: Central Lines [ ] Yes, if yes: Date of Placement                                     [X] No    HOSPITAL MEDICATIONS:  MEDICATIONS  (STANDING):  albuterol/ipratropium for Nebulization 3 milliLiter(s) Nebulizer every 6 hours  amLODIPine   Tablet 10 milliGRAM(s) Oral daily  artificial  tears Solution 2 Drop(s) Both EYES four times a day  ascorbic acid 500 milliGRAM(s) Oral daily  calcium carbonate    500 mG (Tums) Chewable 1 Tablet(s) Chew every 12 hours  chlorhexidine 0.12% Liquid 15 milliLiter(s) Oral Mucosa every 12 hours  chlorhexidine 4% Liquid 1 Application(s) Topical <User Schedule>  dextrose 50% Injectable 12.5 Gram(s) IV Push once  dextrose 50% Injectable 50 milliLiter(s) IV Push once  dextrose 50% Injectable 25 Gram(s) IV Push once  escitalopram 10 milliGRAM(s) Oral <User Schedule>  gabapentin Solution 100 milliGRAM(s) Enteral Tube at bedtime  glucagon  Injectable 1 milliGRAM(s) IntraMuscular once  hemorrhoidal Ointment      hemorrhoidal Ointment 1 Application(s) Rectal daily  insulin glargine Injectable (LANTUS) 18 Unit(s) SubCutaneous every morning  insulin lispro (ADMELOG) corrective regimen sliding scale   SubCutaneous every 6 hours  insulin regular  human recombinant 11 Unit(s) SubCutaneous <User Schedule>  insulin regular  human recombinant 9 Unit(s) SubCutaneous <User Schedule>  insulin regular  human recombinant 24 Unit(s) SubCutaneous <User Schedule>  levothyroxine 125 MICROGram(s) Oral <User Schedule>  lidocaine   4% Patch 1 Patch Transdermal daily  melatonin 5 milliGRAM(s) Oral at bedtime  melatonin 1 milliGRAM(s) Oral at bedtime  multivitamin/minerals/iron Oral Solution (CENTRUM) 15 milliLiter(s) Oral daily  nystatin Powder 1 Application(s) Topical every 8 hours  pantoprazole   Suspension 40 milliGRAM(s) Enteral Tube <User Schedule>  petrolatum Ophthalmic Ointment 1 Application(s) Both EYES four times a day  predniSONE   Tablet 5 milliGRAM(s) Oral daily  QUEtiapine 12.5 milliGRAM(s) Oral <User Schedule>  simethicone 80 milliGRAM(s) Chew four times a day  sodium chloride 3%  Inhalation 4 milliLiter(s) Inhalation every 12 hours  zinc oxide 40% Paste 1 Application(s) Topical daily    MEDICATIONS  (PRN):  acetaminophen   Oral Liquid .. 1000 milliGRAM(s) Enteral Tube every 6 hours PRN Temp greater or equal to 38C (100.4F), Mild Pain (1 - 3)  benzocaine 20% Spray 1 Spray(s) Topical four times a day PRN Cough  diclofenac sodium 1% Gel 2 Gram(s) Topical four times a day PRN pain  diphenhydrAMINE Elixir 25 milliGRAM(s) Oral every 6 hours PRN Rash and/or Itching  guaifenesin/dextromethorphan Oral Liquid 10 milliLiter(s) Oral every 6 hours PRN Cough  sodium chloride 0.65% Nasal 1 Spray(s) Both Nostrils every 6 hours PRN Nasal Congestion      LABS:                  CAPILLARY BLOOD GLUCOSE    MICROBIOLOGY:     RADIOLOGY:  [ ] Reviewed and interpreted by me    Mode: AC/ CMV (Assist Control/ Continuous Mandatory Ventilation)  RR (machine): 12  TV (machine): 300  FiO2: 30  PEEP: 5  ITime: 1  MAP: 9  PIP: 28   Patient is a 73y old  Female who presents with a chief complaint of bleeding (04 Dec 2023 16:57)      Interval Events:  No acute events overnight.     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    CARDIAC  [X]Regular  [ ]Abnormal    PULMONARY  [ ]Bilateral Clear Breath Sounds  [ ]Normal Excursion  [X]Abnormal - BL Coarse BS, blood tinged sputum     GI  [X]PEG      [X] +BS		              [X]Soft, nondistended, nontender	  [ ]Abnormal    MUSCULOSKELETAL                                   [X]Bedbound                 [ ]Abnormal    [ ]Ambulatory/OOB to chair                           EXTREMITIES                                         [X]Normal  [ ]Edema                           NEUROLOGIC  [ ] Normal, non focal  [X] Focal findings: opens eyes spontaneously, follows commands on all 4 extremities, able to mouth words    PSYCHIATRIC  [X]Alert and appropriate  [ ] Sedated	 [ ]Agitated    :  Mcbride: [ ] Yes, if yes: Date of Placement:                   [X] No    LINES: Central Lines [ ] Yes, if yes: Date of Placement                                     [X] No    HOSPITAL MEDICATIONS:  MEDICATIONS  (STANDING):  albuterol/ipratropium for Nebulization 3 milliLiter(s) Nebulizer every 6 hours  amLODIPine   Tablet 10 milliGRAM(s) Oral daily  artificial  tears Solution 2 Drop(s) Both EYES four times a day  ascorbic acid 500 milliGRAM(s) Oral daily  calcium carbonate    500 mG (Tums) Chewable 1 Tablet(s) Chew every 12 hours  chlorhexidine 0.12% Liquid 15 milliLiter(s) Oral Mucosa every 12 hours  chlorhexidine 4% Liquid 1 Application(s) Topical <User Schedule>  dextrose 50% Injectable 12.5 Gram(s) IV Push once  dextrose 50% Injectable 50 milliLiter(s) IV Push once  dextrose 50% Injectable 25 Gram(s) IV Push once  escitalopram 10 milliGRAM(s) Oral <User Schedule>  gabapentin Solution 100 milliGRAM(s) Enteral Tube at bedtime  glucagon  Injectable 1 milliGRAM(s) IntraMuscular once  hemorrhoidal Ointment      hemorrhoidal Ointment 1 Application(s) Rectal daily  insulin glargine Injectable (LANTUS) 18 Unit(s) SubCutaneous every morning  insulin lispro (ADMELOG) corrective regimen sliding scale   SubCutaneous every 6 hours  insulin regular  human recombinant 11 Unit(s) SubCutaneous <User Schedule>  insulin regular  human recombinant 9 Unit(s) SubCutaneous <User Schedule>  insulin regular  human recombinant 24 Unit(s) SubCutaneous <User Schedule>  levothyroxine 125 MICROGram(s) Oral <User Schedule>  lidocaine   4% Patch 1 Patch Transdermal daily  melatonin 5 milliGRAM(s) Oral at bedtime  melatonin 1 milliGRAM(s) Oral at bedtime  multivitamin/minerals/iron Oral Solution (CENTRUM) 15 milliLiter(s) Oral daily  nystatin Powder 1 Application(s) Topical every 8 hours  pantoprazole   Suspension 40 milliGRAM(s) Enteral Tube <User Schedule>  petrolatum Ophthalmic Ointment 1 Application(s) Both EYES four times a day  predniSONE   Tablet 5 milliGRAM(s) Oral daily  QUEtiapine 12.5 milliGRAM(s) Oral <User Schedule>  simethicone 80 milliGRAM(s) Chew four times a day  sodium chloride 3%  Inhalation 4 milliLiter(s) Inhalation every 12 hours  zinc oxide 40% Paste 1 Application(s) Topical daily    MEDICATIONS  (PRN):  acetaminophen   Oral Liquid .. 1000 milliGRAM(s) Enteral Tube every 6 hours PRN Temp greater or equal to 38C (100.4F), Mild Pain (1 - 3)  benzocaine 20% Spray 1 Spray(s) Topical four times a day PRN Cough  diclofenac sodium 1% Gel 2 Gram(s) Topical four times a day PRN pain  diphenhydrAMINE Elixir 25 milliGRAM(s) Oral every 6 hours PRN Rash and/or Itching  guaifenesin/dextromethorphan Oral Liquid 10 milliLiter(s) Oral every 6 hours PRN Cough  sodium chloride 0.65% Nasal 1 Spray(s) Both Nostrils every 6 hours PRN Nasal Congestion      LABS:                  CAPILLARY BLOOD GLUCOSE    MICROBIOLOGY:     RADIOLOGY:  [ ] Reviewed and interpreted by me    Mode: AC/ CMV (Assist Control/ Continuous Mandatory Ventilation)  RR (machine): 12  TV (machine): 300  FiO2: 30  PEEP: 5  ITime: 1  MAP: 9  PIP: 28

## 2023-12-13 NOTE — PROGRESS NOTE ADULT - PROBLEM SELECTOR PLAN 7
- s/p Home Levemir 14 units in morning held on admission as noted w/ hypoglycemia   - Enteral feed changed to Sionex diabetic formula; glucose numbers stabilizing  - adjustments made throughout admission per endocrine recs - s/p Home Levemir 14 units in morning held on admission as noted w/ hypoglycemia   - Enteral feed changed to TensorComm diabetic formula; glucose numbers stabilizing  - adjustments made throughout admission per endocrine recs

## 2023-12-13 NOTE — PROGRESS NOTE ADULT - PROBLEM SELECTOR PLAN 3
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  - Tolerates intermittent PSV 15/5 during day/Vent HS  - Airway Clearance: Duoneb, Hypersal, Chest PT, PRN suctioning   - Maintain 02 sat > 92%    Tracheal Bleeding (Intermittent)-->resolved since admission   - S/p CT Angio neck: No evidence of active bleeding around tracheostomy site. No hematoma.  No collection   - Seen by ENT; Kath and b/l bronchi visualized without any trauma, small amount of blood tinged secretions resting at beginning of right bronchus not actively bleeding. 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  - Tolerates intermittent PSV 15/5 during day/Vent HS  - Airway Clearance: Duoneb, Hypersal, Chest PT, PRN suctioning   - Maintain 02 sat > 92%    Tracheal Bleeding (Intermittent)-->resolved since admission   - S/p CT Angio neck: No evidence of active bleeding around tracheostomy site. No hematoma.  No collection   - Seen by ENT; Kath and b/l bronchi visualized without any trauma, small amount of blood tinged secretions resting at beginning of right bronchus not actively bleeding.  - 12/13 tracheal bleeding upon suctioning - tranexamic neb given

## 2023-12-13 NOTE — PROGRESS NOTE ADULT - PROBLEM SELECTOR PLAN 4
History  Chief Complaint   Patient presents with    Leg Pain     Patient reports b/l leg pain for the last several weeks  L knee and ankle pain, R sided pain intermittently  Patient denies injury     Patient is a 46-year-old female no past medical history presenting with leg pain  Patient states that for the last 1-1 5 months she has had bilateral leg pain which is migrating and states that she is currently having in her left knee and left ankle intermittently has had to the right side, right knee and right ankle in that timeframe  States that is throbbing/aching in nature, worse with movement denies any injuries that she is aware of  Denies any redness or swelling  She states that it is nonradiating and today notes pain to her bilateral low back  Notes chills today but denies fevers, chest pain, abdominal pain, rashes, vision changes, shortness breath, dizziness, dysuria, nausea/vomiting/diarrhea  She states that she was seen by primary care yesterday for same and is taking ibuprofen 200 mg every 8 hours with no relief  She states that last dose was several hours ago  Not taking any other medications  Is up-to-date with all immunizations  Has not had any imaging the legs as of yet  None       History reviewed  No pertinent past medical history  Past Surgical History:   Procedure Laterality Date    TONSILLECTOMY         History reviewed  No pertinent family history  I have reviewed and agree with the history as documented  E-Cigarette/Vaping     E-Cigarette/Vaping Substances     Social History     Tobacco Use    Smoking status: Never Smoker    Smokeless tobacco: Never Used   Substance Use Topics    Alcohol use: Not on file    Drug use: Not on file       Review of Systems   All other systems reviewed and are negative  Physical Exam  Physical Exam  Vitals reviewed  Constitutional:       General: She is not in acute distress  Appearance: Normal appearance   She is not ill-appearing  HENT:      Mouth/Throat:      Mouth: Mucous membranes are moist    Eyes:      Conjunctiva/sclera: Conjunctivae normal    Cardiovascular:      Rate and Rhythm: Normal rate  Pulses: Normal pulses  Pulmonary:      Effort: Pulmonary effort is normal    Abdominal:      General: Abdomen is flat  Tenderness: There is no abdominal tenderness  Musculoskeletal:         General: Tenderness present  No swelling  Normal range of motion  Cervical back: Neck supple  Skin:     General: Skin is warm and dry  Capillary Refill: Capillary refill takes less than 2 seconds  Neurological:      General: No focal deficit present  Mental Status: She is alert  Sensory: No sensory deficit  Motor: No weakness  Gait: Gait normal    Psychiatric:         Mood and Affect: Mood normal          Vital Signs  ED Triage Vitals [07/18/21 1651]   Temperature Pulse Respirations Blood Pressure SpO2   98 2 °F (36 8 °C) (!) 125 18 (!) 119/62 96 %      Temp src Heart Rate Source Patient Position - Orthostatic VS BP Location FiO2 (%)   Temporal Monitor Sitting Left arm --      Pain Score       --           Vitals:    07/18/21 1651 07/18/21 1851   BP: (!) 119/62    Pulse: (!) 125 100   Patient Position - Orthostatic VS: Sitting          Visual Acuity      ED Medications  Medications   acetaminophen (TYLENOL) tablet 650 mg (650 mg Oral Given 7/18/21 1830)   ibuprofen (MOTRIN) tablet 400 mg (400 mg Oral Given 7/18/21 1830)       Diagnostic Studies  Results Reviewed     Procedure Component Value Units Date/Time    Lyme Antibody Profile with reflex to [facility] [[id number]] Collected: 07/18/21 1844    Lab Status: In process Specimen: Blood from Arm, Right Updated: 07/18/21 1847                 XR knee 4+ vw left injury   Final Result by Carrie Chambers MD (07/19 0935)      No acute osseous abnormality              Workstation performed: [id number]         XR ankle 2 views LEFT   Final Result by Ankit Day Gearold Rubinstein, DO (07/19 1003)      No acute osseous abnormality  Workstation performed: [id number]                    Procedures  Procedures         ED Course  ED Course as of Jul 19 1608   Nathalie Leticia Jul 18, 2021   1844 No fracture visualized, will discharge with pediatric follow-up, pediatric orthopedic follow-up  MDM  Number of Diagnoses or Management Options  Diagnosis management comments: Patient is a 77-year-old female no past medical history presenting with leg pain  Patient is well-appearing bedside stable vitals and in no acute distress  She is afebrile with no joint swelling, no decreased range of motion, ambulatory bedside with steady gait with normal strength, sensation, motor, pulses  Will perform screening x-rays of the knee and ankle on the left side as this is where she is tender currently and has no tenderness to the right leg  Not suspect DVT as she has no tenderness to the DVT system, no redness, no swelling and no risk factors  Will administer pain control and reassess  Disposition  Final diagnoses:   None     ED Disposition     ED Disposition Condition Date/Time Comment    Discharge Stable Sun Jul 18, 2021  6:44 PM Memo De Jesus discharge to home/self care  Follow-up Information     Follow up With Specialties Details Why Contact Info    Rupali Huggins DO Family Medicine In 1 week  [address] [place] [address] [phone] [facility] [place] [address],  Orthopedic Surgery, Pediatric Orthopedic Surgery Schedule an appointment as soon as possible for a visit   [address]  [phone]            There are no discharge medications for this patient  No discharge procedures on file      PDMP Review     None          ED Provider  Electronically Signed by           Jessica Hines DO  07/19/21 2929 RISS with coverage

## 2023-12-13 NOTE — PROGRESS NOTE ADULT - NS ATTEND AMEND GEN_ALL_CORE FT
73F with a h/o DM, HTN, HLD, anemia, hypothyroidism, RA, fibromyalgia, remote cerebral aneurysm with repair, hypercapnic respiratory failure s/p tracheostomy/PEG, bedbound, sacral pressure ulcer. Admitted w/ bleeding from tracheostomy and PEG w/ associated abdominal pain, recent hx of auditory/visual hallucinations.   Imaging showed mild thickening along PEG tube tract and sacral ulcer extending to coccyx suggestive of acute osteomyelitis.      # Osteomyelitis  # Chronic hypoxemic RF  # oropharyngeal dysphagia  # acute on chronic pain  # Trach/Peg bleeding  # Tracheitis with Pseudomonas and serratia  # Hx of hallucination, anxiety     #Neuro - awake, alert, and interactive. moving all extremities, mouthing words  Continue current medications  - Behavioral Health Follow-up as needed   #Cardiovascular - hemodynamically stable  #Pulmonary - chronic hypoxemic respiratory failure, has 8XLT trach, on home vent, pressure support trials daily 15/5  #Gastro - oropharyngeal dysphagia with PEG tube in place, tolerating feeds, nutrition follow up appreciated   Abdominal sono 12/1 and CT abdomen 12/4- No acute intra-abdominal findings.   #Infectious Disease - sacral osteo on MRI - wound care follow-up appreciated, c/w offloading and local wound care  -The patient has had this wound since a hospitalization in December 2022 and per daughter does not have chronic or multiple wounds but only this single wound.   - sp 6 week course of Cipro and Keflex as per ID - completed 12/10/23  - Per daughter, patient has had prior multiple infections during hospitalizations including prior pseudomonas, MRSA, E faecalis, and Klebsiella  - Sputum colonized with MDR Pseudomonas   - Daughter reports a recent dental abscess and being told by outpatient dentist that patient needed teeth extracted - Dental evaluation noted with no plans for intervention as inpatient.   #Hem/Onc - prior cytopenias in setting of antibiotics per patient's daughter   - Hem/Onc follow-up noted - suspect an anemia of chronic disease  #Pain - continue current pain control - in setting of fibromyalgia and pain from wound  - Voltaren topical, lidocaine patches and low dose Oxycodone as needed  #Derm - previously seen by derm for skin lesions - mid back with irritated seborrheic keratosis - outpatient follow-up  #Endo - DM2 - continue insulin and adjust as needed for goal FS < 180  - Endocrine follow-up appreciated   - Continue Synthroid - TSH WNL  #DVT proph - SCDs  - Prior prophylactic AC stopped after concern for prior bleeding    Dispo planning 73F with a h/o DM, HTN, HLD, anemia, hypothyroidism, RA, fibromyalgia, remote cerebral aneurysm with repair, hypercapnic respiratory failure s/p tracheostomy/PEG, bedbound, sacral pressure ulcer. Admitted w/ bleeding from tracheostomy and PEG w/ associated abdominal pain, recent hx of auditory/visual hallucinations.   Imaging showed mild thickening along PEG tube tract and sacral ulcer extending to coccyx suggestive of acute osteomyelitis.      # Osteomyelitis  # Chronic hypoxemic RF  # oropharyngeal dysphagia  # acute on chronic pain  # Trach/Peg bleeding  # Tracheitis with Pseudomonas and serratia  # Hx of hallucination, anxiety     #Neuro - awake, alert, and interactive. moving all extremities, mouthing words  Continue current medications  - Behavioral Health Follow-up as needed   #Cardiovascular - hemodynamically stable  #Pulmonary - chronic hypoxemic respiratory failure, has 8XLT trach, on home vent, pressure support trials daily 15/5  - noted to have blood mixed with sputum upon suctioning. FiO2 requirements remain low - 30%FiO2 saturating well. Will dc hypertonic saline in case contributing to irritation, minimize suctioning as tolerated. Check CXR and labs, 1 dose TXA neb  #Gastro - oropharyngeal dysphagia with PEG tube in place, tolerating feeds, nutrition follow up appreciated   Abdominal sono 12/1 and CT abdomen 12/4- No acute intra-abdominal findings.   #Infectious Disease - sacral osteo on MRI - wound care follow-up appreciated, c/w offloading and local wound care  -The patient has had this wound since a hospitalization in December 2022 and per daughter does not have chronic or multiple wounds but only this single wound.   - sp 6 week course of Cipro and Keflex as per ID - completed 12/10/23  - Per daughter, patient has had prior multiple infections during hospitalizations including prior pseudomonas, MRSA, E faecalis, and Klebsiella  - Sputum colonized with MDR Pseudomonas   - Daughter reports a recent dental abscess and being told by outpatient dentist that patient needed teeth extracted - Dental evaluation noted with no plans for intervention as inpatient.   #Hem/Onc - prior cytopenias in setting of antibiotics per patient's daughter   - Hem/Onc follow-up noted - suspect an anemia of chronic disease  #Pain - continue current pain control - in setting of fibromyalgia and pain from wound  - Voltaren topical, lidocaine patches and low dose Oxycodone as needed  #Derm - previously seen by derm for skin lesions - mid back with irritated seborrheic keratosis - outpatient follow-up  #Endo - DM2 - continue insulin and adjust as needed for goal FS < 180  - Endocrine follow-up appreciated   - Continue Synthroid - TSH WNL  #DVT proph - SCDs  - Prior prophylactic AC stopped after concern for prior bleeding

## 2023-12-14 LAB
GLUCOSE BLDC GLUCOMTR-MCNC: 113 MG/DL — HIGH (ref 70–99)
GLUCOSE BLDC GLUCOMTR-MCNC: 152 MG/DL — HIGH (ref 70–99)
GLUCOSE BLDC GLUCOMTR-MCNC: 152 MG/DL — HIGH (ref 70–99)
GLUCOSE BLDC GLUCOMTR-MCNC: 175 MG/DL — HIGH (ref 70–99)
GLUCOSE BLDC GLUCOMTR-MCNC: 175 MG/DL — HIGH (ref 70–99)
RAPID RVP RESULT: SIGNIFICANT CHANGE UP
RAPID RVP RESULT: SIGNIFICANT CHANGE UP
SARS-COV-2 RNA SPEC QL NAA+PROBE: SIGNIFICANT CHANGE UP
SARS-COV-2 RNA SPEC QL NAA+PROBE: SIGNIFICANT CHANGE UP

## 2023-12-14 RX ADMIN — Medication 15 MILLILITER(S): at 13:05

## 2023-12-14 RX ADMIN — LIDOCAINE 1 PATCH: 4 CREAM TOPICAL at 13:06

## 2023-12-14 RX ADMIN — Medication 1: at 01:35

## 2023-12-14 RX ADMIN — NYSTATIN CREAM 1 APPLICATION(S): 100000 CREAM TOPICAL at 21:50

## 2023-12-14 RX ADMIN — Medication 1000 MILLIGRAM(S): at 18:30

## 2023-12-14 RX ADMIN — SIMETHICONE 80 MILLIGRAM(S): 80 TABLET, CHEWABLE ORAL at 13:05

## 2023-12-14 RX ADMIN — INSULIN GLARGINE 18 UNIT(S): 100 INJECTION, SOLUTION SUBCUTANEOUS at 05:58

## 2023-12-14 RX ADMIN — Medication 1 APPLICATION(S): at 05:50

## 2023-12-14 RX ADMIN — Medication 2 DROP(S): at 18:14

## 2023-12-14 RX ADMIN — Medication 3 MILLILITER(S): at 18:23

## 2023-12-14 RX ADMIN — NYSTATIN CREAM 1 APPLICATION(S): 100000 CREAM TOPICAL at 05:50

## 2023-12-14 RX ADMIN — PANTOPRAZOLE SODIUM 40 MILLIGRAM(S): 20 TABLET, DELAYED RELEASE ORAL at 08:41

## 2023-12-14 RX ADMIN — PHENYLEPHRINE-SHARK LIVER OIL-MINERAL OIL-PETROLATUM RECTAL OINTMENT 1 APPLICATION(S): at 13:07

## 2023-12-14 RX ADMIN — CHLORHEXIDINE GLUCONATE 1 APPLICATION(S): 213 SOLUTION TOPICAL at 05:50

## 2023-12-14 RX ADMIN — Medication 1: at 18:16

## 2023-12-14 RX ADMIN — INSULIN HUMAN 11 UNIT(S): 100 INJECTION, SOLUTION SUBCUTANEOUS at 12:32

## 2023-12-14 RX ADMIN — Medication 3 MILLILITER(S): at 11:34

## 2023-12-14 RX ADMIN — INSULIN HUMAN 9 UNIT(S): 100 INJECTION, SOLUTION SUBCUTANEOUS at 18:15

## 2023-12-14 RX ADMIN — Medication 5 MILLIGRAM(S): at 21:50

## 2023-12-14 RX ADMIN — ESCITALOPRAM OXALATE 10 MILLIGRAM(S): 10 TABLET, FILM COATED ORAL at 08:41

## 2023-12-14 RX ADMIN — CHLORHEXIDINE GLUCONATE 15 MILLILITER(S): 213 SOLUTION TOPICAL at 05:49

## 2023-12-14 RX ADMIN — SIMETHICONE 80 MILLIGRAM(S): 80 TABLET, CHEWABLE ORAL at 17:39

## 2023-12-14 RX ADMIN — ZINC OXIDE 1 APPLICATION(S): 200 OINTMENT TOPICAL at 12:33

## 2023-12-14 RX ADMIN — Medication 1 MILLIGRAM(S): at 21:50

## 2023-12-14 RX ADMIN — INSULIN HUMAN 24 UNIT(S): 100 INJECTION, SOLUTION SUBCUTANEOUS at 05:59

## 2023-12-14 RX ADMIN — Medication 3 MILLILITER(S): at 23:24

## 2023-12-14 RX ADMIN — LIDOCAINE 1 PATCH: 4 CREAM TOPICAL at 23:30

## 2023-12-14 RX ADMIN — Medication 3 MILLILITER(S): at 05:55

## 2023-12-14 RX ADMIN — NYSTATIN CREAM 1 APPLICATION(S): 100000 CREAM TOPICAL at 13:06

## 2023-12-14 RX ADMIN — Medication 5 MILLIGRAM(S): at 05:49

## 2023-12-14 RX ADMIN — LIDOCAINE 1 PATCH: 4 CREAM TOPICAL at 00:10

## 2023-12-14 RX ADMIN — QUETIAPINE FUMARATE 12.5 MILLIGRAM(S): 200 TABLET, FILM COATED ORAL at 17:39

## 2023-12-14 RX ADMIN — GABAPENTIN 100 MILLIGRAM(S): 400 CAPSULE ORAL at 21:49

## 2023-12-14 RX ADMIN — Medication 1 APPLICATION(S): at 19:34

## 2023-12-14 RX ADMIN — Medication 1 TABLET(S): at 17:39

## 2023-12-14 RX ADMIN — Medication 125 MICROGRAM(S): at 05:49

## 2023-12-14 RX ADMIN — AMLODIPINE BESYLATE 10 MILLIGRAM(S): 2.5 TABLET ORAL at 05:49

## 2023-12-14 RX ADMIN — Medication 500 MILLIGRAM(S): at 13:05

## 2023-12-14 RX ADMIN — Medication 1 TABLET(S): at 05:49

## 2023-12-14 RX ADMIN — Medication 2 DROP(S): at 05:50

## 2023-12-14 RX ADMIN — Medication 2 DROP(S): at 13:07

## 2023-12-14 RX ADMIN — Medication 1 APPLICATION(S): at 13:08

## 2023-12-14 RX ADMIN — SIMETHICONE 80 MILLIGRAM(S): 80 TABLET, CHEWABLE ORAL at 05:49

## 2023-12-14 RX ADMIN — CHLORHEXIDINE GLUCONATE 15 MILLILITER(S): 213 SOLUTION TOPICAL at 17:39

## 2023-12-14 NOTE — PROGRESS NOTE ADULT - SUBJECTIVE AND OBJECTIVE BOX
Patient is a 73y old  Female who presents with a chief complaint of bleeding (04 Dec 2023 16:57)      Interval Events:    REVIEW OF SYSTEMS:  [ ] Positive  [ ] All other systems negative  [ ] Unable to assess ROS because ________    Vital Signs Last 24 Hrs  T(C): 37.3 (12-14-23 @ 04:49), Max: 37.3 (12-14-23 @ 04:49)  T(F): 99.2 (12-14-23 @ 04:49), Max: 99.2 (12-14-23 @ 04:49)  HR: 80 (12-14-23 @ 06:25) (79 - 92)  BP: 128/63 (12-14-23 @ 04:49) (108/51 - 128/63)  RR: 20 (12-14-23 @ 04:49) (18 - 20)  SpO2: 100% (12-14-23 @ 06:25) (98% - 100%)    PHYSICAL EXAM:  HEENT:   [ ]Tracheostomy:  [ ]Pupils equal  [ ]No oral lesions  [ ]Abnormal    SKIN  [ ]No Rash  [ ] Abnormal  [ ] pressure    CARDIAC  [ ]Regular  [ ]Abnormal    PULMONARY  [ ]Bilateral Clear Breath Sounds  [ ]Normal Excursion  [ ]Abnormal    GI  [ ]PEG      [ ] +BS		              [ ]Soft, nondistended, nontender	  [ ]Abnormal    MUSCULOSKELETAL                                   [ ]Bedbound                 [ ]Abnormal    [ ]Ambulatory/OOB to chair                           EXTREMITIES                                         [ ]Normal  [ ]Edema                           NEUROLOGIC  [ ] Normal, non focal  [ ] Focal findings:    PSYCHIATRIC  [ ]Alert and appropriate  [ ] Sedated	 [ ]Agitated    :  Mcbride: [ ] Yes, if yes: Date of Placement:                   [  ] No    LINES: Central Lines [ ] Yes, if yes: Date of Placement                                     [  ] No    HOSPITAL MEDICATIONS:  MEDICATIONS  (STANDING):  albuterol/ipratropium for Nebulization 3 milliLiter(s) Nebulizer every 6 hours  amLODIPine   Tablet 10 milliGRAM(s) Oral daily  artificial  tears Solution 2 Drop(s) Both EYES four times a day  ascorbic acid 500 milliGRAM(s) Oral daily  calcium carbonate    500 mG (Tums) Chewable 1 Tablet(s) Chew every 12 hours  chlorhexidine 0.12% Liquid 15 milliLiter(s) Oral Mucosa every 12 hours  chlorhexidine 4% Liquid 1 Application(s) Topical <User Schedule>  dextrose 50% Injectable 50 milliLiter(s) IV Push once  dextrose 50% Injectable 25 Gram(s) IV Push once  dextrose 50% Injectable 12.5 Gram(s) IV Push once  escitalopram 10 milliGRAM(s) Oral <User Schedule>  fentaNYL   Patch  25 MICROgram(s)/Hr 1 Patch Transdermal every 72 hours  gabapentin Solution 100 milliGRAM(s) Enteral Tube at bedtime  glucagon  Injectable 1 milliGRAM(s) IntraMuscular once  hemorrhoidal Ointment      hemorrhoidal Ointment 1 Application(s) Rectal daily  insulin glargine Injectable (LANTUS) 18 Unit(s) SubCutaneous every morning  insulin lispro (ADMELOG) corrective regimen sliding scale   SubCutaneous every 6 hours  insulin regular  human recombinant 11 Unit(s) SubCutaneous <User Schedule>  insulin regular  human recombinant 9 Unit(s) SubCutaneous <User Schedule>  insulin regular  human recombinant 24 Unit(s) SubCutaneous <User Schedule>  levothyroxine 125 MICROGram(s) Oral <User Schedule>  lidocaine   4% Patch 1 Patch Transdermal daily  melatonin 1 milliGRAM(s) Oral at bedtime  melatonin 5 milliGRAM(s) Oral at bedtime  multivitamin/minerals/iron Oral Solution (CENTRUM) 15 milliLiter(s) Oral daily  nystatin Powder 1 Application(s) Topical every 8 hours  pantoprazole   Suspension 40 milliGRAM(s) Enteral Tube <User Schedule>  petrolatum Ophthalmic Ointment 1 Application(s) Both EYES four times a day  predniSONE   Tablet 5 milliGRAM(s) Oral daily  QUEtiapine 12.5 milliGRAM(s) Oral <User Schedule>  simethicone 80 milliGRAM(s) Chew four times a day  zinc oxide 40% Paste 1 Application(s) Topical daily    MEDICATIONS  (PRN):  acetaminophen   Oral Liquid .. 1000 milliGRAM(s) Enteral Tube every 6 hours PRN Temp greater or equal to 38C (100.4F), Mild Pain (1 - 3)  benzocaine 20% Spray 1 Spray(s) Topical four times a day PRN Cough  diclofenac sodium 1% Gel 2 Gram(s) Topical four times a day PRN pain  diphenhydrAMINE Elixir 25 milliGRAM(s) Oral every 6 hours PRN Rash and/or Itching  guaifenesin/dextromethorphan Oral Liquid 10 milliLiter(s) Oral every 6 hours PRN Cough  oxyCODONE    Solution 2.5 milliGRAM(s) Oral every 6 hours PRN Moderate Pain (4 - 6)  sodium chloride 0.65% Nasal 1 Spray(s) Both Nostrils every 6 hours PRN Nasal Congestion      LABS:                        9.6    8.57  )-----------( 159      ( 13 Dec 2023 12:05 )             31.8     12-13    139  |  104  |  26<H>  ----------------------------<  167<H>  4.7   |  26  |  <0.30<L>    Ca    9.4      13 Dec 2023 12:05  Phos  4.3     12-13  Mg     1.9     12-13        Urinalysis Basic - ( 13 Dec 2023 12:05 )    Color: x / Appearance: x / SG: x / pH: x  Gluc: 167 mg/dL / Ketone: x  / Bili: x / Urobili: x   Blood: x / Protein: x / Nitrite: x   Leuk Esterase: x / RBC: x / WBC x   Sq Epi: x / Non Sq Epi: x / Bacteria: x          CAPILLARY BLOOD GLUCOSE    MICROBIOLOGY:     RADIOLOGY:  [ ] Reviewed and interpreted by me    Mode: AC/ CMV (Assist Control/ Continuous Mandatory Ventilation)  RR (machine): 12  TV (machine): 300  FiO2: 30  PEEP: 5  ITime: 1  MAP: 9  PIP: 24   Patient is a 73y old  Female who presents with a chief complaint of bleeding (04 Dec 2023 16:57)      Interval Events:  No acute events overnight.     REVIEW OF SYSTEMS:  [ ] Positive  [ ] All other systems negative  [ ] Unable to assess ROS because ________    Vital Signs Last 24 Hrs  T(C): 37.3 (12-14-23 @ 04:49), Max: 37.3 (12-14-23 @ 04:49)  T(F): 99.2 (12-14-23 @ 04:49), Max: 99.2 (12-14-23 @ 04:49)  HR: 80 (12-14-23 @ 06:25) (79 - 92)  BP: 128/63 (12-14-23 @ 04:49) (108/51 - 128/63)  RR: 20 (12-14-23 @ 04:49) (18 - 20)  SpO2: 100% (12-14-23 @ 06:25) (98% - 100%)    PHYSICAL EXAM:  HEENT:   [X]Tracheostomy: #8 distal XLT shiley  [X]Pupils equal  [ ]No oral lesions  [ ]Abnormal    SKIN  [ ]No Rash  [ ] Abnormal  [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    CARDIAC  [X]Regular  [ ]Abnormal    PULMONARY  [X]Bilateral Clear Breath Sounds  [ ]Normal Excursion  [ ]Abnormal    GI  [X]PEG      [X] +BS		              [X]Soft, nondistended, nontender  [ ]Abnormal    MUSCULOSKELETAL                                   [X]Bedbound           [ ]Abnormal    [ ]Ambulatory/OOB to chair                           EXTREMITIES                                         [X]Normal  [ ]Edema                           NEUROLOGIC  [ ] Normal, non focal  [X] Focal findings: opens eyes spontaneously, follows commands on all 4 extremities, able to mouth words    PSYCHIATRIC  [X]Alert and appropriate  [ ] Sedated	 [ ]Agitated    :  Mcbride: [ ] Yes, if yes: Date of Placement:                   [X] No    LINES: Central Lines [ ] Yes, if yes: Date of Placement                                     [X] No    HOSPITAL MEDICATIONS:  MEDICATIONS  (STANDING):  albuterol/ipratropium for Nebulization 3 milliLiter(s) Nebulizer every 6 hours  amLODIPine   Tablet 10 milliGRAM(s) Oral daily  artificial  tears Solution 2 Drop(s) Both EYES four times a day  ascorbic acid 500 milliGRAM(s) Oral daily  calcium carbonate    500 mG (Tums) Chewable 1 Tablet(s) Chew every 12 hours  chlorhexidine 0.12% Liquid 15 milliLiter(s) Oral Mucosa every 12 hours  chlorhexidine 4% Liquid 1 Application(s) Topical <User Schedule>  dextrose 50% Injectable 50 milliLiter(s) IV Push once  dextrose 50% Injectable 25 Gram(s) IV Push once  dextrose 50% Injectable 12.5 Gram(s) IV Push once  escitalopram 10 milliGRAM(s) Oral <User Schedule>  fentaNYL   Patch  25 MICROgram(s)/Hr 1 Patch Transdermal every 72 hours  gabapentin Solution 100 milliGRAM(s) Enteral Tube at bedtime  glucagon  Injectable 1 milliGRAM(s) IntraMuscular once  hemorrhoidal Ointment      hemorrhoidal Ointment 1 Application(s) Rectal daily  insulin glargine Injectable (LANTUS) 18 Unit(s) SubCutaneous every morning  insulin lispro (ADMELOG) corrective regimen sliding scale   SubCutaneous every 6 hours  insulin regular  human recombinant 11 Unit(s) SubCutaneous <User Schedule>  insulin regular  human recombinant 9 Unit(s) SubCutaneous <User Schedule>  insulin regular  human recombinant 24 Unit(s) SubCutaneous <User Schedule>  levothyroxine 125 MICROGram(s) Oral <User Schedule>  lidocaine   4% Patch 1 Patch Transdermal daily  melatonin 1 milliGRAM(s) Oral at bedtime  melatonin 5 milliGRAM(s) Oral at bedtime  multivitamin/minerals/iron Oral Solution (CENTRUM) 15 milliLiter(s) Oral daily  nystatin Powder 1 Application(s) Topical every 8 hours  pantoprazole   Suspension 40 milliGRAM(s) Enteral Tube <User Schedule>  petrolatum Ophthalmic Ointment 1 Application(s) Both EYES four times a day  predniSONE   Tablet 5 milliGRAM(s) Oral daily  QUEtiapine 12.5 milliGRAM(s) Oral <User Schedule>  simethicone 80 milliGRAM(s) Chew four times a day  zinc oxide 40% Paste 1 Application(s) Topical daily    MEDICATIONS  (PRN):  acetaminophen   Oral Liquid .. 1000 milliGRAM(s) Enteral Tube every 6 hours PRN Temp greater or equal to 38C (100.4F), Mild Pain (1 - 3)  benzocaine 20% Spray 1 Spray(s) Topical four times a day PRN Cough  diclofenac sodium 1% Gel 2 Gram(s) Topical four times a day PRN pain  diphenhydrAMINE Elixir 25 milliGRAM(s) Oral every 6 hours PRN Rash and/or Itching  guaifenesin/dextromethorphan Oral Liquid 10 milliLiter(s) Oral every 6 hours PRN Cough  oxyCODONE    Solution 2.5 milliGRAM(s) Oral every 6 hours PRN Moderate Pain (4 - 6)  sodium chloride 0.65% Nasal 1 Spray(s) Both Nostrils every 6 hours PRN Nasal Congestion      LABS:                        9.6    8.57  )-----------( 159      ( 13 Dec 2023 12:05 )             31.8     12-13    139  |  104  |  26<H>  ----------------------------<  167<H>  4.7   |  26  |  <0.30<L>    Ca    9.4      13 Dec 2023 12:05  Phos  4.3     12-13  Mg     1.9     12-13        Urinalysis Basic - ( 13 Dec 2023 12:05 )    Color: x / Appearance: x / SG: x / pH: x  Gluc: 167 mg/dL / Ketone: x  / Bili: x / Urobili: x   Blood: x / Protein: x / Nitrite: x   Leuk Esterase: x / RBC: x / WBC x   Sq Epi: x / Non Sq Epi: x / Bacteria: x          CAPILLARY BLOOD GLUCOSE    MICROBIOLOGY:     RADIOLOGY:  [ ] Reviewed and interpreted by me    Mode: AC/ CMV (Assist Control/ Continuous Mandatory Ventilation)  RR (machine): 12  TV (machine): 300  FiO2: 30  PEEP: 5  ITime: 1  MAP: 9  PIP: 24

## 2023-12-14 NOTE — PROGRESS NOTE ADULT - NS ATTEND AMEND GEN_ALL_CORE FT
73F with a h/o DM, HTN, HLD, anemia, hypothyroidism, RA, fibromyalgia, remote cerebral aneurysm with repair, hypercapnic respiratory failure s/p tracheostomy/PEG, bedbound, sacral pressure ulcer. Admitted w/ bleeding from tracheostomy and PEG w/ associated abdominal pain, recent hx of auditory/visual hallucinations.   Imaging showed mild thickening along PEG tube tract and sacral ulcer extending to coccyx suggestive of acute osteomyelitis.      # Osteomyelitis  # Chronic hypoxemic RF  # oropharyngeal dysphagia  # acute on chronic pain  # Trach/Peg bleeding  # Tracheitis with Pseudomonas and serratia  # Hx of hallucination, anxiety     #Neuro - awake, alert, and interactive. moving all extremities, mouthing words  Continue current medications  - Behavioral Health Follow-up as needed   #Cardiovascular - hemodynamically stable  #Pulmonary - chronic hypoxemic respiratory failure, has 8XLT trach, on home vent, pressure support trials daily 15/5  - noted to have blood mixed with sputum upon suctioning. FiO2 requirements remain low - 30%FiO2 saturating well.  dc hypertonic saline in case contributing to irritation, minimize suctioning as tolerated.  labs appreciated no sig thrombocytopenia, 1 dose TXA neb. Improving today  #Gastro - oropharyngeal dysphagia with PEG tube in place, tolerating feeds, nutrition follow up appreciated   Abdominal sono 12/1 and CT abdomen 12/4- No acute intra-abdominal findings.   #Infectious Disease - sacral osteo on MRI - wound care follow-up appreciated, c/w offloading and local wound care  -The patient has had this wound since a hospitalization in December 2022 and per daughter does not have chronic or multiple wounds but only this single wound.   - sp 6 week course of Cipro and Keflex as per ID - completed 12/10/23  - Per daughter, patient has had prior multiple infections during hospitalizations including prior pseudomonas, MRSA, E faecalis, and Klebsiella  - Sputum colonized with MDR Pseudomonas   - Daughter reports a recent dental abscess and being told by outpatient dentist that patient needed teeth extracted - Dental evaluation noted with no plans for intervention as inpatient.   #Hem/Onc - prior cytopenias in setting of antibiotics per patient's daughter   - Hem/Onc follow-up noted - suspect an anemia of chronic disease  #Pain - continue current pain control - in setting of fibromyalgia and pain from wound  - Voltaren topical, lidocaine patches and low dose Oxycodone as needed  #Derm - previously seen by derm for skin lesions - mid back with irritated seborrheic keratosis - outpatient follow-up  #Endo - DM2 - continue insulin and adjust as needed for goal FS < 180  - Endocrine follow-up appreciated   - Continue Synthroid - TSH WNL  #DVT proph - SCDs  - Prior prophylactic AC stopped after concern for prior bleeding.

## 2023-12-14 NOTE — PROGRESS NOTE ADULT - PROBLEM SELECTOR PLAN 7
- s/p Home Levemir 14 units in morning held on admission as noted w/ hypoglycemia   - Enteral feed changed to [employer] diabetic formula; glucose numbers stabilizing  - adjustments made throughout admission per endocrine recs - s/p Home Levemir 14 units in morning held on admission as noted w/ hypoglycemia   - Enteral feed changed to [employer] diabetic formula; glucose numbers stabilizing  - adjustments made throughout admission per endocrine recs

## 2023-12-14 NOTE — PROGRESS NOTE ADULT - ASSESSMENT
72 y/o F with PMHx of T2DM, HTN, HLD, anemia, hypothyroidism, RA, fibromyalgia, remote cerebral aneurysm repair, acute hypercapnic respiratory failure now with tracheostomy, PEG tube, and bedbound (trach change 8/18, PEG change 8/14), sacral pressure ulcer, BIBEMS from home for 6 day hx of bleeding from the tracheostomy and PEG tube with associated abdominal pain, recent history of auditory and visual hallucinations, and with chronic sacral decubitus ulcer. Exam notable for mild abdominal distention with LLQ and RLQ tenderness, tracheostomy in place with no obvious bleeding, PEG tube with scant amount of dried blood, at baseline neurologic status. Imaging showing no active bleeding around tracheostomy site, however was notable for mild thickening along PEG tube tract, sacral ulcer extending to the coccyx suggestive of possible osteomyelitis, and bibasilar patchy lung opacities with volume loss. Patient admitted for respiratory support, wound care of tracheostomy and PEG, and management of sacral osteomyelitis. No further Trach and peg site bleeding noted since admission.  Pelvic MRI imaging also suggestive of Acute OM. Patient placed on long-term antibiotics with Infectious disease guidance.  Additionally treated with a 7d course of Cefepime due to PSA and Serratia tracheitis on 11/8-->11/15 and then resumed cipro/keflex.  Hospital course c/b Delirium for which Seroquel was added and home Lexapro continued. Tracheostomy changed to #9 Cuffed [place] by ENT 11/3.  Despite trach change patient remained with persistent air leak.  On 11/19, the trach was again changed due to leak and loss of volumes on vent.  Trach was changed to #8 distal cuffed Shiley.  Patient seen by Dermatology for back lesions.  One diagnosed as a seborrheic keratitis and the other a dermatofibroma.  Intermittent abdominal pain throughout hospital course, at times relieved with gas/BM, w/u negative, seen by GI - no recs.  12/10 pt completed cipro and keflex for osteo treatment.  12/13 moderate amounts of secretions w/ blood - tranexamic acid neb given.        12/13:  Pt stable.  Spoke with daughter Marysol this am - concerns were addressed.  Pt with bloody sputum from trach during suction likely 2/2 trauma from suctioning - tranexamic acid inhalation x1 and chest xray to be performed - will monitor bleeding.  Daughter aware of plan of care. 72 y/o F with PMHx of T2DM, HTN, HLD, anemia, hypothyroidism, RA, fibromyalgia, remote cerebral aneurysm repair, acute hypercapnic respiratory failure now with tracheostomy, PEG tube, and bedbound (trach change 8/18, PEG change 8/14), sacral pressure ulcer, BIBEMS from home for 6 day hx of bleeding from the tracheostomy and PEG tube with associated abdominal pain, recent history of auditory and visual hallucinations, and with chronic sacral decubitus ulcer. Exam notable for mild abdominal distention with LLQ and RLQ tenderness, tracheostomy in place with no obvious bleeding, PEG tube with scant amount of dried blood, at baseline neurologic status. Imaging showing no active bleeding around tracheostomy site, however was notable for mild thickening along PEG tube tract, sacral ulcer extending to the coccyx suggestive of possible osteomyelitis, and bibasilar patchy lung opacities with volume loss. Patient admitted for respiratory support, wound care of tracheostomy and PEG, and management of sacral osteomyelitis. No further Trach and peg site bleeding noted since admission.  Pelvic MRI imaging also suggestive of Acute OM. Patient placed on long-term antibiotics with Infectious disease guidance.  Additionally treated with a 7d course of Cefepime due to PSA and Serratia tracheitis on 11/8-->11/15 and then resumed cipro/keflex.  Hospital course c/b Delirium for which Seroquel was added and home Lexapro continued. Tracheostomy changed to #9 Cuffed [place] by ENT 11/3.  Despite trach change patient remained with persistent air leak.  On 11/19, the trach was again changed due to leak and loss of volumes on vent.  Trach was changed to #8 distal cuffed Shiley.  Patient seen by Dermatology for back lesions.  One diagnosed as a seborrheic keratitis and the other a dermatofibroma.  Intermittent abdominal pain throughout hospital course, at times relieved with gas/BM, w/u negative, seen by GI - no recs.  12/10 pt completed cipro and keflex for osteo treatment.  12/13 moderate amounts of secretions w/ blood - tranexamic acid neb given.        12/14: 72 y/o F with PMHx of T2DM, HTN, HLD, anemia, hypothyroidism, RA, fibromyalgia, remote cerebral aneurysm repair, acute hypercapnic respiratory failure now with tracheostomy, PEG tube, and bedbound (trach change 8/18, PEG change 8/14), sacral pressure ulcer, BIBEMS from home for 6 day hx of bleeding from the tracheostomy and PEG tube with associated abdominal pain, recent history of auditory and visual hallucinations, and with chronic sacral decubitus ulcer. Exam notable for mild abdominal distention with LLQ and RLQ tenderness, tracheostomy in place with no obvious bleeding, PEG tube with scant amount of dried blood, at baseline neurologic status. Imaging showing no active bleeding around tracheostomy site, however was notable for mild thickening along PEG tube tract, sacral ulcer extending to the coccyx suggestive of possible osteomyelitis, and bibasilar patchy lung opacities with volume loss. Patient admitted for respiratory support, wound care of tracheostomy and PEG, and management of sacral osteomyelitis. No further Trach and peg site bleeding noted since admission.  Pelvic MRI imaging also suggestive of Acute OM. Patient placed on long-term antibiotics with Infectious disease guidance.  Additionally treated with a 7d course of Cefepime due to PSA and Serratia tracheitis on 11/8-->11/15 and then resumed cipro/keflex.  Hospital course c/b Delirium for which Seroquel was added and home Lexapro continued. Tracheostomy changed to #9 Cuffed [place] by ENT 11/3.  Despite trach change patient remained with persistent air leak.  On 11/19, the trach was again changed due to leak and loss of volumes on vent.  Trach was changed to #8 distal cuffed Shiley.  Patient seen by Dermatology for back lesions.  One diagnosed as a seborrheic keratitis and the other a dermatofibroma.  Intermittent abdominal pain throughout hospital course, at times relieved with gas/BM, w/u negative, seen by GI - no recs.  12/10 pt completed cipro and keflex for osteo treatment.  12/13 moderate amounts of secretions w/ blood - tranexamic acid neb given.        12/14:  S/p TXA nebulizer yesterday.  Bloody secretions were much improved today.  Held off on in-line PMV for today due to recent administration of TXA - will re-eval tomorrow.  Pt otherwise stable.  Pts daughter Marysol updated/involved in plan of care.  74 y/o F with PMHx of T2DM, HTN, HLD, anemia, hypothyroidism, RA, fibromyalgia, remote cerebral aneurysm repair, acute hypercapnic respiratory failure now with tracheostomy, PEG tube, and bedbound (trach change 8/18, PEG change 8/14), sacral pressure ulcer, BIBEMS from home for 6 day hx of bleeding from the tracheostomy and PEG tube with associated abdominal pain, recent history of auditory and visual hallucinations, and with chronic sacral decubitus ulcer. Exam notable for mild abdominal distention with LLQ and RLQ tenderness, tracheostomy in place with no obvious bleeding, PEG tube with scant amount of dried blood, at baseline neurologic status. Imaging showing no active bleeding around tracheostomy site, however was notable for mild thickening along PEG tube tract, sacral ulcer extending to the coccyx suggestive of possible osteomyelitis, and bibasilar patchy lung opacities with volume loss. Patient admitted for respiratory support, wound care of tracheostomy and PEG, and management of sacral osteomyelitis. No further Trach and peg site bleeding noted since admission.  Pelvic MRI imaging also suggestive of Acute OM. Patient placed on long-term antibiotics with Infectious disease guidance.  Additionally treated with a 7d course of Cefepime due to PSA and Serratia tracheitis on 11/8-->11/15 and then resumed cipro/keflex.  Hospital course c/b Delirium for which Seroquel was added and home Lexapro continued. Tracheostomy changed to #9 Cuffed [place] by ENT 11/3.  Despite trach change patient remained with persistent air leak.  On 11/19, the trach was again changed due to leak and loss of volumes on vent.  Trach was changed to #8 distal cuffed Shiley.  Patient seen by Dermatology for back lesions.  One diagnosed as a seborrheic keratitis and the other a dermatofibroma.  Intermittent abdominal pain throughout hospital course, at times relieved with gas/BM, w/u negative, seen by GI - no recs.  12/10 pt completed cipro and keflex for osteo treatment.  12/13 moderate amounts of secretions w/ blood - tranexamic acid neb given.        12/14:  S/p TXA nebulizer yesterday.  Bloody secretions were much improved today.  Held off on in-line PMV for today due to recent administration of TXA - will re-eval tomorrow.  Pt otherwise stable.  Pts daughter Marysol updated/involved in plan of care.  Bactrim Pregnancy And Lactation Text: This medication is Pregnancy Category D and is known to cause fetal risk.  It is also excreted in breast milk.

## 2023-12-14 NOTE — PROGRESS NOTE ADULT - PROBLEM SELECTOR PLAN 3
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  - Tolerates intermittent PSV 15/5 during day/Vent HS  - Airway Clearance: Duoneb, Hypersal, Chest PT, PRN suctioning   - Maintain 02 sat > 92%    Tracheal Bleeding (Intermittent)-->resolved since admission   - S/p CT Angio neck: No evidence of active bleeding around tracheostomy site. No hematoma.  No collection   - Seen by ENT; Kath and b/l bronchi visualized without any trauma, small amount of blood tinged secretions resting at beginning of right bronchus not actively bleeding.  - 12/13 tracheal bleeding upon suctioning - tranexamic neb given

## 2023-12-14 NOTE — PROGRESS NOTE ADULT - TIME BILLING
review of the medical record, physical exam, review of imaging, and labs, discussion with interdisciplinary team and medical decision making as above

## 2023-12-14 NOTE — PROGRESS NOTE ADULT - PROBLEM SELECTOR PLAN 2
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   - S/p CT abd/pelvis (10/28): Sacral decubitus ulcer extending to the coccyx with osseous remodeling and pelvic MRI or bone scan to recc to exclude osteomyelitis.  - 10/30 wound care note: Sacral stage 4 pressure injury 4.5cm x 2.5cm x 1.5cm w/ lip of undermining circumferentially greatest 9-12 of 3cm.  - MRI pelvis 10/30 with Osteomyelitis, completed Cefepime for 7 days, Vancomycin d/marli   - 11/10: resumed Cipro 500mg q 12 and Keflex 500mg q 6 until 12/10.  - 11/20: Changed to IV Cipro 400 q12 as recommended by ID pharmacist due to multiple drug interactions when given via PEG  - 11/28 wound care note: Sacral stage 4pressure injury 4.5cm x 2.5cm x 0.5cm, moist granular wound bed   palpable but not exposed bone no blistering, (+) serosanguinous drainage. No odor, erythema, increased warmth, tenderness, induration, fluctuance, nor crepitus.  - 12/3 wound culture ordered - d/c'd as no need for culture, wound improving, no signs of infection  - 12/10 completed cipro and keflex x5week course.  - wound care following 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   - S/p CT abd/pelvis (10/28): Sacral decubitus ulcer extending to the coccyx with osseous remodeling and pelvic MRI or bone scan to recc to exclude osteomyelitis.  - 10/30 wound care note: Sacral stage 4 pressure injury 4.5cm x 2.5cm x 1.5cm w/ lip of undermining circumferentially greatest 9-12 of 3cm.  - MRI pelvis 10/30 with Osteomyelitis, completed Cefepime for 7 days, Vancomycin d/marli   - 11/10: resumed Cipro 500mg q 12 and Keflex 500mg q 6 until 12/10.  - 11/20: Changed to IV Cipro 400 q12 as recommended by ID pharmacist due to multiple drug interactions when given via PEG  - 11/28 wound care note: Sacral stage 4pressure injury 4.5cm x 2.5cm x 0.5cm, moist granular wound bed   palpable but not exposed bone no blistering, (+) serosanguinous drainage. No odor, erythema, increased warmth, tenderness, induration, fluctuance, nor crepitus.  - 12/3 wound culture ordered - d/c'd as no need for culture, wound improving, no signs of infection  - 12/10 completed cipro and keflex x5 week course.  - wound care following

## 2023-12-15 DIAGNOSIS — E78.5 HYPERLIPIDEMIA, UNSPECIFIED: ICD-10-CM

## 2023-12-15 DIAGNOSIS — Z78.9 OTHER SPECIFIED HEALTH STATUS: ICD-10-CM

## 2023-12-15 LAB
ALBUMIN SERPL ELPH-MCNC: 3.3 G/DL — SIGNIFICANT CHANGE UP (ref 3.3–5)
ALBUMIN SERPL ELPH-MCNC: 3.3 G/DL — SIGNIFICANT CHANGE UP (ref 3.3–5)
ALP SERPL-CCNC: 48 U/L — SIGNIFICANT CHANGE UP (ref 40–120)
ALP SERPL-CCNC: 48 U/L — SIGNIFICANT CHANGE UP (ref 40–120)
ALT FLD-CCNC: 20 U/L — SIGNIFICANT CHANGE UP (ref 10–45)
ALT FLD-CCNC: 20 U/L — SIGNIFICANT CHANGE UP (ref 10–45)
ANION GAP SERPL CALC-SCNC: 10 MMOL/L — SIGNIFICANT CHANGE UP (ref 5–17)
ANION GAP SERPL CALC-SCNC: 10 MMOL/L — SIGNIFICANT CHANGE UP (ref 5–17)
APPEARANCE UR: ABNORMAL
APPEARANCE UR: ABNORMAL
AST SERPL-CCNC: 21 U/L — SIGNIFICANT CHANGE UP (ref 10–40)
AST SERPL-CCNC: 21 U/L — SIGNIFICANT CHANGE UP (ref 10–40)
BASOPHILS # BLD AUTO: 0.02 K/UL — SIGNIFICANT CHANGE UP (ref 0–0.2)
BASOPHILS # BLD AUTO: 0.02 K/UL — SIGNIFICANT CHANGE UP (ref 0–0.2)
BASOPHILS NFR BLD AUTO: 0.2 % — SIGNIFICANT CHANGE UP (ref 0–2)
BASOPHILS NFR BLD AUTO: 0.2 % — SIGNIFICANT CHANGE UP (ref 0–2)
BILIRUB SERPL-MCNC: 0.3 MG/DL — SIGNIFICANT CHANGE UP (ref 0.2–1.2)
BILIRUB SERPL-MCNC: 0.3 MG/DL — SIGNIFICANT CHANGE UP (ref 0.2–1.2)
BILIRUB UR-MCNC: NEGATIVE — SIGNIFICANT CHANGE UP
BILIRUB UR-MCNC: NEGATIVE — SIGNIFICANT CHANGE UP
BUN SERPL-MCNC: 32 MG/DL — HIGH (ref 7–23)
BUN SERPL-MCNC: 32 MG/DL — HIGH (ref 7–23)
CALCIUM SERPL-MCNC: 9.7 MG/DL — SIGNIFICANT CHANGE UP (ref 8.4–10.5)
CALCIUM SERPL-MCNC: 9.7 MG/DL — SIGNIFICANT CHANGE UP (ref 8.4–10.5)
CHLORIDE SERPL-SCNC: 102 MMOL/L — SIGNIFICANT CHANGE UP (ref 96–108)
CHLORIDE SERPL-SCNC: 102 MMOL/L — SIGNIFICANT CHANGE UP (ref 96–108)
CO2 SERPL-SCNC: 29 MMOL/L — SIGNIFICANT CHANGE UP (ref 22–31)
CO2 SERPL-SCNC: 29 MMOL/L — SIGNIFICANT CHANGE UP (ref 22–31)
COLOR SPEC: YELLOW — SIGNIFICANT CHANGE UP
COLOR SPEC: YELLOW — SIGNIFICANT CHANGE UP
CREAT SERPL-MCNC: <0.3 MG/DL — LOW (ref 0.5–1.3)
CREAT SERPL-MCNC: <0.3 MG/DL — LOW (ref 0.5–1.3)
DIFF PNL FLD: NEGATIVE — SIGNIFICANT CHANGE UP
DIFF PNL FLD: NEGATIVE — SIGNIFICANT CHANGE UP
EGFR: 113 ML/MIN/1.73M2 — SIGNIFICANT CHANGE UP
EGFR: 113 ML/MIN/1.73M2 — SIGNIFICANT CHANGE UP
EOSINOPHIL # BLD AUTO: 0.03 K/UL — SIGNIFICANT CHANGE UP (ref 0–0.5)
EOSINOPHIL # BLD AUTO: 0.03 K/UL — SIGNIFICANT CHANGE UP (ref 0–0.5)
EOSINOPHIL NFR BLD AUTO: 0.4 % — SIGNIFICANT CHANGE UP (ref 0–6)
EOSINOPHIL NFR BLD AUTO: 0.4 % — SIGNIFICANT CHANGE UP (ref 0–6)
GLUCOSE BLDC GLUCOMTR-MCNC: 112 MG/DL — HIGH (ref 70–99)
GLUCOSE BLDC GLUCOMTR-MCNC: 112 MG/DL — HIGH (ref 70–99)
GLUCOSE BLDC GLUCOMTR-MCNC: 125 MG/DL — HIGH (ref 70–99)
GLUCOSE BLDC GLUCOMTR-MCNC: 125 MG/DL — HIGH (ref 70–99)
GLUCOSE BLDC GLUCOMTR-MCNC: 135 MG/DL — HIGH (ref 70–99)
GLUCOSE BLDC GLUCOMTR-MCNC: 135 MG/DL — HIGH (ref 70–99)
GLUCOSE BLDC GLUCOMTR-MCNC: 156 MG/DL — HIGH (ref 70–99)
GLUCOSE BLDC GLUCOMTR-MCNC: 156 MG/DL — HIGH (ref 70–99)
GLUCOSE SERPL-MCNC: 123 MG/DL — HIGH (ref 70–99)
GLUCOSE SERPL-MCNC: 123 MG/DL — HIGH (ref 70–99)
GLUCOSE UR QL: NEGATIVE MG/DL — SIGNIFICANT CHANGE UP
GLUCOSE UR QL: NEGATIVE MG/DL — SIGNIFICANT CHANGE UP
HCT VFR BLD CALC: 30.9 % — LOW (ref 34.5–45)
HCT VFR BLD CALC: 30.9 % — LOW (ref 34.5–45)
HGB BLD-MCNC: 9.2 G/DL — LOW (ref 11.5–15.5)
HGB BLD-MCNC: 9.2 G/DL — LOW (ref 11.5–15.5)
IMM GRANULOCYTES NFR BLD AUTO: 1.7 % — HIGH (ref 0–0.9)
IMM GRANULOCYTES NFR BLD AUTO: 1.7 % — HIGH (ref 0–0.9)
KETONES UR-MCNC: NEGATIVE MG/DL — SIGNIFICANT CHANGE UP
KETONES UR-MCNC: NEGATIVE MG/DL — SIGNIFICANT CHANGE UP
LEUKOCYTE ESTERASE UR-ACNC: ABNORMAL
LEUKOCYTE ESTERASE UR-ACNC: ABNORMAL
LYMPHOCYTES # BLD AUTO: 2.04 K/UL — SIGNIFICANT CHANGE UP (ref 1–3.3)
LYMPHOCYTES # BLD AUTO: 2.04 K/UL — SIGNIFICANT CHANGE UP (ref 1–3.3)
LYMPHOCYTES # BLD AUTO: 24.6 % — SIGNIFICANT CHANGE UP (ref 13–44)
LYMPHOCYTES # BLD AUTO: 24.6 % — SIGNIFICANT CHANGE UP (ref 13–44)
MAGNESIUM SERPL-MCNC: 2 MG/DL — SIGNIFICANT CHANGE UP (ref 1.6–2.6)
MAGNESIUM SERPL-MCNC: 2 MG/DL — SIGNIFICANT CHANGE UP (ref 1.6–2.6)
MCHC RBC-ENTMCNC: 28 PG — SIGNIFICANT CHANGE UP (ref 27–34)
MCHC RBC-ENTMCNC: 28 PG — SIGNIFICANT CHANGE UP (ref 27–34)
MCHC RBC-ENTMCNC: 29.8 GM/DL — LOW (ref 32–36)
MCHC RBC-ENTMCNC: 29.8 GM/DL — LOW (ref 32–36)
MCV RBC AUTO: 93.9 FL — SIGNIFICANT CHANGE UP (ref 80–100)
MCV RBC AUTO: 93.9 FL — SIGNIFICANT CHANGE UP (ref 80–100)
MONOCYTES # BLD AUTO: 0.35 K/UL — SIGNIFICANT CHANGE UP (ref 0–0.9)
MONOCYTES # BLD AUTO: 0.35 K/UL — SIGNIFICANT CHANGE UP (ref 0–0.9)
MONOCYTES NFR BLD AUTO: 4.2 % — SIGNIFICANT CHANGE UP (ref 2–14)
MONOCYTES NFR BLD AUTO: 4.2 % — SIGNIFICANT CHANGE UP (ref 2–14)
NEUTROPHILS # BLD AUTO: 5.7 K/UL — SIGNIFICANT CHANGE UP (ref 1.8–7.4)
NEUTROPHILS # BLD AUTO: 5.7 K/UL — SIGNIFICANT CHANGE UP (ref 1.8–7.4)
NEUTROPHILS NFR BLD AUTO: 68.9 % — SIGNIFICANT CHANGE UP (ref 43–77)
NEUTROPHILS NFR BLD AUTO: 68.9 % — SIGNIFICANT CHANGE UP (ref 43–77)
NITRITE UR-MCNC: NEGATIVE — SIGNIFICANT CHANGE UP
NITRITE UR-MCNC: NEGATIVE — SIGNIFICANT CHANGE UP
NRBC # BLD: 0 /100 WBCS — SIGNIFICANT CHANGE UP (ref 0–0)
NRBC # BLD: 0 /100 WBCS — SIGNIFICANT CHANGE UP (ref 0–0)
PH UR: 6 — SIGNIFICANT CHANGE UP (ref 5–8)
PH UR: 6 — SIGNIFICANT CHANGE UP (ref 5–8)
PHOSPHATE SERPL-MCNC: 4.6 MG/DL — HIGH (ref 2.5–4.5)
PHOSPHATE SERPL-MCNC: 4.6 MG/DL — HIGH (ref 2.5–4.5)
PLATELET # BLD AUTO: 107 K/UL — LOW (ref 150–400)
PLATELET # BLD AUTO: 107 K/UL — LOW (ref 150–400)
POTASSIUM SERPL-MCNC: 3.7 MMOL/L — SIGNIFICANT CHANGE UP (ref 3.5–5.3)
POTASSIUM SERPL-MCNC: 3.7 MMOL/L — SIGNIFICANT CHANGE UP (ref 3.5–5.3)
POTASSIUM SERPL-SCNC: 3.7 MMOL/L — SIGNIFICANT CHANGE UP (ref 3.5–5.3)
POTASSIUM SERPL-SCNC: 3.7 MMOL/L — SIGNIFICANT CHANGE UP (ref 3.5–5.3)
PROT SERPL-MCNC: 7.1 G/DL — SIGNIFICANT CHANGE UP (ref 6–8.3)
PROT SERPL-MCNC: 7.1 G/DL — SIGNIFICANT CHANGE UP (ref 6–8.3)
PROT UR-MCNC: NEGATIVE MG/DL — SIGNIFICANT CHANGE UP
PROT UR-MCNC: NEGATIVE MG/DL — SIGNIFICANT CHANGE UP
RBC # BLD: 3.29 M/UL — LOW (ref 3.8–5.2)
RBC # BLD: 3.29 M/UL — LOW (ref 3.8–5.2)
RBC # FLD: 16.6 % — HIGH (ref 10.3–14.5)
RBC # FLD: 16.6 % — HIGH (ref 10.3–14.5)
SODIUM SERPL-SCNC: 141 MMOL/L — SIGNIFICANT CHANGE UP (ref 135–145)
SODIUM SERPL-SCNC: 141 MMOL/L — SIGNIFICANT CHANGE UP (ref 135–145)
SP GR SPEC: 1.02 — SIGNIFICANT CHANGE UP (ref 1–1.03)
SP GR SPEC: 1.02 — SIGNIFICANT CHANGE UP (ref 1–1.03)
UROBILINOGEN FLD QL: 0.2 MG/DL — SIGNIFICANT CHANGE UP (ref 0.2–1)
UROBILINOGEN FLD QL: 0.2 MG/DL — SIGNIFICANT CHANGE UP (ref 0.2–1)
WBC # BLD: 8.28 K/UL — SIGNIFICANT CHANGE UP (ref 3.8–10.5)
WBC # BLD: 8.28 K/UL — SIGNIFICANT CHANGE UP (ref 3.8–10.5)
WBC # FLD AUTO: 8.28 K/UL — SIGNIFICANT CHANGE UP (ref 3.8–10.5)
WBC # FLD AUTO: 8.28 K/UL — SIGNIFICANT CHANGE UP (ref 3.8–10.5)

## 2023-12-15 PROCEDURE — 99233 SBSQ HOSP IP/OBS HIGH 50: CPT

## 2023-12-15 RX ADMIN — SIMETHICONE 80 MILLIGRAM(S): 80 TABLET, CHEWABLE ORAL at 11:53

## 2023-12-15 RX ADMIN — Medication 5 MILLIGRAM(S): at 06:57

## 2023-12-15 RX ADMIN — Medication 0: at 05:57

## 2023-12-15 RX ADMIN — Medication 1 MILLIGRAM(S): at 21:12

## 2023-12-15 RX ADMIN — LIDOCAINE 1 PATCH: 4 CREAM TOPICAL at 00:57

## 2023-12-15 RX ADMIN — Medication 2 DROP(S): at 00:15

## 2023-12-15 RX ADMIN — INSULIN HUMAN 9 UNIT(S): 100 INJECTION, SOLUTION SUBCUTANEOUS at 18:31

## 2023-12-15 RX ADMIN — SIMETHICONE 80 MILLIGRAM(S): 80 TABLET, CHEWABLE ORAL at 00:14

## 2023-12-15 RX ADMIN — Medication 3 MILLILITER(S): at 06:20

## 2023-12-15 RX ADMIN — Medication 3 MILLILITER(S): at 12:08

## 2023-12-15 RX ADMIN — LIDOCAINE 1 PATCH: 4 CREAM TOPICAL at 11:53

## 2023-12-15 RX ADMIN — Medication 125 MICROGRAM(S): at 06:05

## 2023-12-15 RX ADMIN — Medication 1 APPLICATION(S): at 17:54

## 2023-12-15 RX ADMIN — CHLORHEXIDINE GLUCONATE 1 APPLICATION(S): 213 SOLUTION TOPICAL at 06:07

## 2023-12-15 RX ADMIN — Medication 3 MILLILITER(S): at 23:14

## 2023-12-15 RX ADMIN — Medication 3 MILLILITER(S): at 18:20

## 2023-12-15 RX ADMIN — ZINC OXIDE 1 APPLICATION(S): 200 OINTMENT TOPICAL at 11:54

## 2023-12-15 RX ADMIN — Medication 1 APPLICATION(S): at 11:54

## 2023-12-15 RX ADMIN — Medication 1 APPLICATION(S): at 00:14

## 2023-12-15 RX ADMIN — ESCITALOPRAM OXALATE 10 MILLIGRAM(S): 10 TABLET, FILM COATED ORAL at 09:42

## 2023-12-15 RX ADMIN — CHLORHEXIDINE GLUCONATE 15 MILLILITER(S): 213 SOLUTION TOPICAL at 06:01

## 2023-12-15 RX ADMIN — LIDOCAINE 1 PATCH: 4 CREAM TOPICAL at 23:53

## 2023-12-15 RX ADMIN — Medication 0: at 00:15

## 2023-12-15 RX ADMIN — Medication 1 APPLICATION(S): at 05:59

## 2023-12-15 RX ADMIN — INSULIN GLARGINE 18 UNIT(S): 100 INJECTION, SOLUTION SUBCUTANEOUS at 09:43

## 2023-12-15 RX ADMIN — GABAPENTIN 100 MILLIGRAM(S): 400 CAPSULE ORAL at 21:12

## 2023-12-15 RX ADMIN — Medication 1 TABLET(S): at 17:52

## 2023-12-15 RX ADMIN — PHENYLEPHRINE-SHARK LIVER OIL-MINERAL OIL-PETROLATUM RECTAL OINTMENT 1 APPLICATION(S): at 11:55

## 2023-12-15 RX ADMIN — SIMETHICONE 80 MILLIGRAM(S): 80 TABLET, CHEWABLE ORAL at 06:56

## 2023-12-15 RX ADMIN — INSULIN HUMAN 24 UNIT(S): 100 INJECTION, SOLUTION SUBCUTANEOUS at 05:57

## 2023-12-15 RX ADMIN — Medication 500 MILLIGRAM(S): at 11:53

## 2023-12-15 RX ADMIN — CHLORHEXIDINE GLUCONATE 15 MILLILITER(S): 213 SOLUTION TOPICAL at 17:53

## 2023-12-15 RX ADMIN — QUETIAPINE FUMARATE 12.5 MILLIGRAM(S): 200 TABLET, FILM COATED ORAL at 17:52

## 2023-12-15 RX ADMIN — LIDOCAINE 1 PATCH: 4 CREAM TOPICAL at 19:35

## 2023-12-15 RX ADMIN — NYSTATIN CREAM 1 APPLICATION(S): 100000 CREAM TOPICAL at 06:07

## 2023-12-15 RX ADMIN — Medication 5 MILLIGRAM(S): at 21:13

## 2023-12-15 RX ADMIN — Medication 1 TABLET(S): at 06:56

## 2023-12-15 RX ADMIN — Medication 2 DROP(S): at 17:53

## 2023-12-15 RX ADMIN — Medication 1: at 12:40

## 2023-12-15 RX ADMIN — PANTOPRAZOLE SODIUM 40 MILLIGRAM(S): 20 TABLET, DELAYED RELEASE ORAL at 09:42

## 2023-12-15 RX ADMIN — AMLODIPINE BESYLATE 10 MILLIGRAM(S): 2.5 TABLET ORAL at 06:56

## 2023-12-15 RX ADMIN — Medication 2 DROP(S): at 06:01

## 2023-12-15 RX ADMIN — Medication 15 MILLILITER(S): at 11:53

## 2023-12-15 RX ADMIN — INSULIN HUMAN 11 UNIT(S): 100 INJECTION, SOLUTION SUBCUTANEOUS at 12:39

## 2023-12-15 RX ADMIN — NYSTATIN CREAM 1 APPLICATION(S): 100000 CREAM TOPICAL at 21:26

## 2023-12-15 RX ADMIN — Medication 2 DROP(S): at 11:54

## 2023-12-15 RX ADMIN — SIMETHICONE 80 MILLIGRAM(S): 80 TABLET, CHEWABLE ORAL at 17:53

## 2023-12-15 NOTE — PROGRESS NOTE ADULT - NUTRITIONAL ASSESSMENT
Diet, NPO with Tube Feed:   Tube Feeding Modality: Gastrostomy  [employer] glucose support 1.2  Total Volume for 24 Hours (mL): 1200  Continuous  Starting Tube Feed Rate {mL per Hour}: 60  Increase Tube Feed Rate by (mL): 0  Until Goal Tube Feed Rate (mL per Hour): 60  Tube Feed Duration (in Hours): 20  Tube Feed Start Time: 06:00  Tube Feed Stop Time: 02:00  Free Water Flush  Pump   Rate (mL per Hour): 10   Frequency: Every 2 Hours  Alfred(7 Gm Arginine/7 Gm Glut/1.2 Gm HMB     Qty per Day:  2 (11-29-23)    EN order providing if 100% provision: 1440kcal (30kcal/kg) and 77g protein (1.6g/kg) Diet, NPO with Tube Feed:   Tube Feeding Modality: Gastrostomy  [employer] [place] glucose support 1.2  Total Volume for 24 Hours (mL): 1200  Continuous  Starting Tube Feed Rate {mL per Hour}: 60  Increase Tube Feed Rate by (mL): 0  Until Goal Tube Feed Rate (mL per Hour): 60  Tube Feed Duration (in Hours): 20  Tube Feed Start Time: 06:00  Tube Feed Stop Time: 02:00  Free Water Flush  Pump   Rate (mL per Hour): 10   Frequency: Every 2 Hours  Alfred(7 Gm Arginine/7 Gm Glut/1.2 Gm HMB     Qty per Day:  2 (11-29-23)    EN order providing if 100% provision: 1440kcal (30kcal/kg) and 77g protein (1.6g/kg)

## 2023-12-15 NOTE — PROGRESS NOTE ADULT - ASSESSMENT
72 y/o F with PMHx of T2DM, HTN, HLD, anemia, hypothyroidism, RA, fibromyalgia, remote cerebral aneurysm repair, acute hypercapnic respiratory failure now with tracheostomy, PEG tube, and bedbound (trach change 8/18, PEG change 8/14), sacral pressure ulcer, BIBEMS from home for 6 day hx of bleeding from the tracheostomy and PEG tube with associated abdominal pain, recent history of auditory and visual hallucinations, and with chronic sacral decubitus ulcer. Exam notable for mild abdominal distention with LLQ and RLQ tenderness, tracheostomy in place with no obvious bleeding, PEG tube with scant amount of dried blood, at baseline neurologic status. Imaging showing no active bleeding around tracheostomy site, however was notable for mild thickening along PEG tube tract, sacral ulcer extending to the coccyx suggestive of possible osteomyelitis, and bibasilar patchy lung opacities with volume loss. Patient admitted for respiratory support, wound care of tracheostomy and PEG, and management of sacral osteomyelitis. No further Trach and peg site bleeding noted since admission.  Pelvic MRI imaging also suggestive of Acute OM. Patient placed on long-term antibiotics with Infectious disease guidance.  Additionally treated with a 7d course of Cefepime due to PSA and Serratia tracheitis on 11/8-->11/15 and then resumed cipro/keflex.  Hospital course c/b Delirium for which Seroquel was added and home Lexapro continued. Tracheostomy changed to #9 Cuffed [place] by ENT 11/3.  Despite trach change patient remained with persistent air leak.  On 11/19, the trach was again changed due to leak and loss of volumes on vent.  Trach was changed to #8 distal cuffed [place].  Patient seen by Dermatology for back lesions.  One diagnosed as a seborrheic keratitis and the other a dermatofibroma.  Intermittent abdominal pain throughout hospital course, at times relieved with gas/BM, w/u negative, seen by GI - no recs.  12/10 pt completed cipro and keflex for osteo treatment.  12/13 moderate amounts of secretions w/ blood - tranexamic acid neb given.        12/14:  S/p TXA nebulizer yesterday.  Bloody secretions were much improved today.  Held off on in-line PMV for today due to recent administration of TXA - will re-eval tomorrow.  Pt otherwise stable.  Pts daughter Marysol updated/involved in plan of care.  74 y/o F with PMHx of T2DM, HTN, HLD, anemia, hypothyroidism, RA, fibromyalgia, remote cerebral aneurysm repair, acute hypercapnic respiratory failure now with tracheostomy, PEG tube, and bedbound (trach change 8/18, PEG change 8/14), sacral pressure ulcer, BIBEMS from home for 6 day hx of bleeding from the tracheostomy and PEG tube with associated abdominal pain, recent history of auditory and visual hallucinations, and with chronic sacral decubitus ulcer. Exam notable for mild abdominal distention with LLQ and RLQ tenderness, tracheostomy in place with no obvious bleeding, PEG tube with scant amount of dried blood, at baseline neurologic status. Imaging showing no active bleeding around tracheostomy site, however was notable for mild thickening along PEG tube tract, sacral ulcer extending to the coccyx suggestive of possible osteomyelitis, and bibasilar patchy lung opacities with volume loss. Patient admitted for respiratory support, wound care of tracheostomy and PEG, and management of sacral osteomyelitis. No further Trach and peg site bleeding noted since admission.  Pelvic MRI imaging also suggestive of Acute OM. Patient placed on long-term antibiotics with Infectious disease guidance.  Additionally treated with a 7d course of Cefepime due to PSA and Serratia tracheitis on 11/8-->11/15 and then resumed cipro/keflex.  Hospital course c/b Delirium for which Seroquel was added and home Lexapro continued. Tracheostomy changed to #9 Cuffed [place] by ENT 11/3.  Despite trach change patient remained with persistent air leak.  On 11/19, the trach was again changed due to leak and loss of volumes on vent.  Trach was changed to #8 distal cuffed [place].  Patient seen by Dermatology for back lesions.  One diagnosed as a seborrheic keratitis and the other a dermatofibroma.  Intermittent abdominal pain throughout hospital course, at times relieved with gas/BM, w/u negative, seen by GI - no recs.  12/10 pt completed cipro and keflex for osteo treatment.  12/13 moderate amounts of secretions w/ blood - tranexamic acid neb given.        12/14:  S/p TXA nebulizer yesterday.  Bloody secretions were much improved today.  Held off on in-line PMV for today due to recent administration of TXA - will re-eval tomorrow.  Pt otherwise stable.  Pts daughter Marysol updated/involved in plan of care.  72 y/o F with PMHx of T2DM, HTN, HLD, anemia, hypothyroidism, RA, fibromyalgia, remote cerebral aneurysm repair, acute hypercapnic respiratory failure now with tracheostomy, PEG tube, and bedbound (trach change 8/18, PEG change 8/14), sacral pressure ulcer, BIBEMS from home for 6 day hx of bleeding from the tracheostomy and PEG tube with associated abdominal pain, recent history of auditory and visual hallucinations, and with chronic sacral decubitus ulcer. Exam notable for mild abdominal distention with LLQ and RLQ tenderness, tracheostomy in place with no obvious bleeding, PEG tube with scant amount of dried blood, at baseline neurologic status. Imaging showing no active bleeding around tracheostomy site, however was notable for mild thickening along PEG tube tract, sacral ulcer extending to the coccyx suggestive of possible osteomyelitis, and bibasilar patchy lung opacities with volume loss. Patient admitted for respiratory support, wound care of tracheostomy and PEG, and management of sacral osteomyelitis. No further Trach and peg site bleeding noted since admission.  Pelvic MRI imaging also suggestive of Acute OM. Patient placed on long-term antibiotics with Infectious disease guidance.  Additionally treated with a 7d course of Cefepime due to PSA and Serratia tracheitis on 11/8-->11/15 and then resumed cipro/keflex.  Hospital course c/b Delirium for which Seroquel was added and home Lexapro continued. Tracheostomy changed to #9 Cuffed [place] by ENT 11/3.  Despite trach change patient remained with persistent air leak.  On 11/19, the trach was again changed due to leak and loss of volumes on vent.  Trach was changed to #8 distal cuffed [place].  Patient seen by Dermatology for back lesions.  One diagnosed as a seborrheic keratitis and the other a dermatofibroma.  Intermittent abdominal pain throughout hospital course, at times relieved with gas/BM, w/u negative, seen by GI - no recs.  12/10 pt completed cipro and keflex for osteo treatment.  12/13 moderate amounts of secretions w/ blood - tranexamic acid neb given.      12/14:  S/p TXA nebulizer yesterday.  Bloody secretions were much improved today.  Held off on in-line PMV for today due to recent administration of TXA - will re-eval tomorrow.  Pt otherwise stable.  Pts daughter Marysol updated/involved in plan of care.   12/15:   Fever yesterday 74 y/o F with PMHx of T2DM, HTN, HLD, anemia, hypothyroidism, RA, fibromyalgia, remote cerebral aneurysm repair, acute hypercapnic respiratory failure now with tracheostomy, PEG tube, and bedbound (trach change 8/18, PEG change 8/14), sacral pressure ulcer, BIBEMS from home for 6 day hx of bleeding from the tracheostomy and PEG tube with associated abdominal pain, recent history of auditory and visual hallucinations, and with chronic sacral decubitus ulcer. Exam notable for mild abdominal distention with LLQ and RLQ tenderness, tracheostomy in place with no obvious bleeding, PEG tube with scant amount of dried blood, at baseline neurologic status. Imaging showing no active bleeding around tracheostomy site, however was notable for mild thickening along PEG tube tract, sacral ulcer extending to the coccyx suggestive of possible osteomyelitis, and bibasilar patchy lung opacities with volume loss. Patient admitted for respiratory support, wound care of tracheostomy and PEG, and management of sacral osteomyelitis. No further Trach and peg site bleeding noted since admission.  Pelvic MRI imaging also suggestive of Acute OM. Patient placed on long-term antibiotics with Infectious disease guidance.  Additionally treated with a 7d course of Cefepime due to PSA and Serratia tracheitis on 11/8-->11/15 and then resumed cipro/keflex.  Hospital course c/b Delirium for which Seroquel was added and home Lexapro continued. Tracheostomy changed to #9 Cuffed [place] by ENT 11/3.  Despite trach change patient remained with persistent air leak.  On 11/19, the trach was again changed due to leak and loss of volumes on vent.  Trach was changed to #8 distal cuffed [place].  Patient seen by Dermatology for back lesions.  One diagnosed as a seborrheic keratitis and the other a dermatofibroma.  Intermittent abdominal pain throughout hospital course, at times relieved with gas/BM, w/u negative, seen by GI - no recs.  12/10 pt completed cipro and keflex for osteo treatment.  12/13 moderate amounts of secretions w/ blood - tranexamic acid neb given.      12/14:  S/p TXA nebulizer yesterday.  Bloody secretions were much improved today.  Held off on in-line PMV for today due to recent administration of TXA - will re-eval tomorrow.  Pt otherwise stable.  Pts daughter Marysol updated/involved in plan of care.   12/15:   Fever yesterday 72 y/o F with PMHx of T2DM, HTN, HLD, anemia, hypothyroidism, RA, fibromyalgia, remote cerebral aneurysm repair, acute hypercapnic respiratory failure now with tracheostomy, PEG tube, and bedbound (trach change 8/18, PEG change 8/14), sacral pressure ulcer, BIBEMS from home for 6 day hx of bleeding from the tracheostomy and PEG tube with associated abdominal pain, recent history of auditory and visual hallucinations, and with chronic sacral decubitus ulcer. Exam notable for mild abdominal distention with LLQ and RLQ tenderness, tracheostomy in place with no obvious bleeding, PEG tube with scant amount of dried blood, at baseline neurologic status. Imaging showing no active bleeding around tracheostomy site, however was notable for mild thickening along PEG tube tract, sacral ulcer extending to the coccyx suggestive of possible osteomyelitis, and bibasilar patchy lung opacities with volume loss. Patient admitted for respiratory support, wound care of tracheostomy and PEG, and management of sacral osteomyelitis. No further Trach and peg site bleeding noted since admission.  Pelvic MRI imaging also suggestive of Acute OM. Patient placed on long-term antibiotics with Infectious disease guidance.  Additionally treated with a 7d course of Cefepime due to PSA and Serratia tracheitis on 11/8-->11/15 and then resumed cipro/keflex.  Hospital course c/b Delirium for which Seroquel was added and home Lexapro continued. Tracheostomy changed to #9 Cuffed [place] by ENT 11/3.  Despite trach change patient remained with persistent air leak.  On 11/19, the trach was again changed due to leak and loss of volumes on vent.  Trach was changed to #8 distal cuffed [place].  Patient seen by Dermatology for back lesions.  One diagnosed as a seborrheic keratitis and the other a dermatofibroma.  Intermittent abdominal pain throughout hospital course, at times relieved with gas/BM, w/u negative, seen by GI - no recs.  12/10 pt completed cipro and keflex for osteo treatment.  12/13 moderate amounts of secretions w/ blood - tranexamic acid neb given.      12/14:  S/p TXA nebulizer yesterday.  Bloody secretions were much improved today.  Held off on in-line PMV for today due to recent administration of TXA - will re-eval tomorrow.  Pt otherwise stable.  Pts daughter Marysol updated/involved in plan of care.   12/15:   Fever yesterday evening, 100.8F.  RVP and UA negative.  Blood cultures obtained.  Remains off antibiotics.  Patient complained of bottom tooth pain, gums appear inflamed but largely unchanged from prior exams.   74 y/o F with PMHx of T2DM, HTN, HLD, anemia, hypothyroidism, RA, fibromyalgia, remote cerebral aneurysm repair, acute hypercapnic respiratory failure now with tracheostomy, PEG tube, and bedbound (trach change 8/18, PEG change 8/14), sacral pressure ulcer, BIBEMS from home for 6 day hx of bleeding from the tracheostomy and PEG tube with associated abdominal pain, recent history of auditory and visual hallucinations, and with chronic sacral decubitus ulcer. Exam notable for mild abdominal distention with LLQ and RLQ tenderness, tracheostomy in place with no obvious bleeding, PEG tube with scant amount of dried blood, at baseline neurologic status. Imaging showing no active bleeding around tracheostomy site, however was notable for mild thickening along PEG tube tract, sacral ulcer extending to the coccyx suggestive of possible osteomyelitis, and bibasilar patchy lung opacities with volume loss. Patient admitted for respiratory support, wound care of tracheostomy and PEG, and management of sacral osteomyelitis. No further Trach and peg site bleeding noted since admission.  Pelvic MRI imaging also suggestive of Acute OM. Patient placed on long-term antibiotics with Infectious disease guidance.  Additionally treated with a 7d course of Cefepime due to PSA and Serratia tracheitis on 11/8-->11/15 and then resumed cipro/keflex.  Hospital course c/b Delirium for which Seroquel was added and home Lexapro continued. Tracheostomy changed to #9 Cuffed [place] by ENT 11/3.  Despite trach change patient remained with persistent air leak.  On 11/19, the trach was again changed due to leak and loss of volumes on vent.  Trach was changed to #8 distal cuffed [place].  Patient seen by Dermatology for back lesions.  One diagnosed as a seborrheic keratitis and the other a dermatofibroma.  Intermittent abdominal pain throughout hospital course, at times relieved with gas/BM, w/u negative, seen by GI - no recs.  12/10 pt completed cipro and keflex for osteo treatment.  12/13 moderate amounts of secretions w/ blood - tranexamic acid neb given.      12/14:  S/p TXA nebulizer yesterday.  Bloody secretions were much improved today.  Held off on in-line PMV for today due to recent administration of TXA - will re-eval tomorrow.  Pt otherwise stable.  Pts daughter Marysol updated/involved in plan of care.   12/15:   Fever yesterday evening, 100.8F.  RVP and UA negative.  Blood cultures obtained.  Remains off antibiotics.  Patient complained of bottom tooth pain, gums appear inflamed but largely unchanged from prior exams.   74 y/o F with PMHx of T2DM, HTN, HLD, anemia, hypothyroidism, RA, fibromyalgia, remote cerebral aneurysm repair, acute hypercapnic respiratory failure now with tracheostomy, PEG tube, and bedbound (trach change 8/18, PEG change 8/14), sacral pressure ulcer, BIBEMS from home for 6 day hx of bleeding from the tracheostomy and PEG tube with associated abdominal pain, recent history of auditory and visual hallucinations, and with chronic sacral decubitus ulcer. Exam notable for mild abdominal distention with LLQ and RLQ tenderness, tracheostomy in place with no obvious bleeding, PEG tube with scant amount of dried blood, at baseline neurologic status. Imaging showing no active bleeding around tracheostomy site, however was notable for mild thickening along PEG tube tract, sacral ulcer extending to the coccyx suggestive of possible osteomyelitis, and bibasilar patchy lung opacities with volume loss. Patient admitted for respiratory support, wound care of tracheostomy and PEG, and management of sacral osteomyelitis. No further Trach and peg site bleeding noted since admission.  Pelvic MRI imaging also suggestive of Acute OM. Patient placed on long-term antibiotics with Infectious disease guidance.  Additionally treated with a 7d course of Cefepime due to PSA and Serratia tracheitis on 11/8-->11/15 and then resumed cipro/keflex.  Hospital course c/b Delirium for which Seroquel was added and home Lexapro continued. Tracheostomy changed to #9 Cuffed [place] by ENT 11/3.  Despite trach change patient remained with persistent air leak.  On 11/19, the trach was again changed due to leak and loss of volumes on vent.  Trach was changed to #8 distal cuffed [place].  Patient seen by Dermatology for back lesions.  One diagnosed as a seborrheic keratitis and the other a dermatofibroma.  Intermittent abdominal pain throughout hospital course, at times relieved with gas/BM, w/u negative, seen by GI - no recs.  12/10 pt completed cipro and keflex for osteo treatment.  12/13 moderate amounts of secretions w/ blood - tranexamic acid neb given.      12/14:  S/p TXA nebulizer yesterday.  Bloody secretions were much improved today.  Held off on in-line PMV for today due to recent administration of TXA - will re-eval tomorrow.  Pt otherwise stable.  Pts daughter Marysol updated/involved in plan of care.   12/15:   Fever yesterday evening, 100.8F.  RVP and UA negative.  Blood cultures obtained.  Remains off antibiotics.  Patient complained of bottom tooth pain, gums appear inflamed but largely unchanged from prior exams except for notable loose teeth.  Dental reconsulted to evaluate for teeth extraction as patient and daughter amenable due to aspiration. 72 y/o F with PMHx of T2DM, HTN, HLD, anemia, hypothyroidism, RA, fibromyalgia, remote cerebral aneurysm repair, acute hypercapnic respiratory failure now with tracheostomy, PEG tube, and bedbound (trach change 8/18, PEG change 8/14), sacral pressure ulcer, BIBEMS from home for 6 day hx of bleeding from the tracheostomy and PEG tube with associated abdominal pain, recent history of auditory and visual hallucinations, and with chronic sacral decubitus ulcer. Exam notable for mild abdominal distention with LLQ and RLQ tenderness, tracheostomy in place with no obvious bleeding, PEG tube with scant amount of dried blood, at baseline neurologic status. Imaging showing no active bleeding around tracheostomy site, however was notable for mild thickening along PEG tube tract, sacral ulcer extending to the coccyx suggestive of possible osteomyelitis, and bibasilar patchy lung opacities with volume loss. Patient admitted for respiratory support, wound care of tracheostomy and PEG, and management of sacral osteomyelitis. No further Trach and peg site bleeding noted since admission.  Pelvic MRI imaging also suggestive of Acute OM. Patient placed on long-term antibiotics with Infectious disease guidance.  Additionally treated with a 7d course of Cefepime due to PSA and Serratia tracheitis on 11/8-->11/15 and then resumed cipro/keflex.  Hospital course c/b Delirium for which Seroquel was added and home Lexapro continued. Tracheostomy changed to #9 Cuffed [place] by ENT 11/3.  Despite trach change patient remained with persistent air leak.  On 11/19, the trach was again changed due to leak and loss of volumes on vent.  Trach was changed to #8 distal cuffed [place].  Patient seen by Dermatology for back lesions.  One diagnosed as a seborrheic keratitis and the other a dermatofibroma.  Intermittent abdominal pain throughout hospital course, at times relieved with gas/BM, w/u negative, seen by GI - no recs.  12/10 pt completed cipro and keflex for osteo treatment.  12/13 moderate amounts of secretions w/ blood - tranexamic acid neb given.      12/14:  S/p TXA nebulizer yesterday.  Bloody secretions were much improved today.  Held off on in-line PMV for today due to recent administration of TXA - will re-eval tomorrow.  Pt otherwise stable.  Pts daughter Marysol updated/involved in plan of care.   12/15:   Fever yesterday evening, 100.8F.  RVP and UA negative.  Blood cultures obtained.  Remains off antibiotics.  Patient complained of bottom tooth pain, gums appear inflamed but largely unchanged from prior exams except for notable loose teeth.  Dental reconsulted to evaluate for teeth extraction as patient and daughter amenable due to aspiration.

## 2023-12-15 NOTE — PROGRESS NOTE ADULT - SUBJECTIVE AND OBJECTIVE BOX
Please refer to previous consult note for additional information.    Chikis Woods. (MRN: [id number]): 72 y/o female inpatient presents with bleeding from tracheostomy and PEG tube. Dental consulted to assess gum pain.     HPI:  72 y/o F with PMHx of T2DM, HTN, HLD, anemia, hypothyroidism, RA, fibromyalgia, remote cerebral aneurysm repair, acute hypercapnic respiratory failure now with tracheostomy, PEG tube, and bedbound (trach change 8/18, PEG change 8/14), sacral pressure ulcer, BIBEMS from home for 6 day hx of bleeding from the tracheostomy and PEG tube with associated abdominal pain. Patient still having regular bowel movements, last one occurred prior to ED arrival. Was seen outpatient on 10/26 by critical care Dr. Zaki Gottlieb and apparently had cultures sent at that time. Patient also noted to have chronic sacral decubitous ulcer. Family endorsed one episode of fever, though was not febrile on evaluation. Daughter noted patient was recently experiencing auditory and visual hallucinations (seeing animals that were not there & hearing a "bomb" going off outside her home), though patient not actively hallucinating on evaluation.  ED course notable for CT neck imaging showing no evidence of active bleeding around the tracheostomy site, no hematomas, and no drainable collection around the tracheostomy site. CT abdomen/pelvis notable for gastrostomy tube localized to the stomach with mild thickening noted along the tract; a sacral decubitus ulcer extending to the coccyx with osseous remodeling, possibly representing osteomyelitis; and bibasilar patchy opacities with volume loss in the lungs, representing atelectasis vs infection. Patient admitted for respiratory support, wound care of tracheostomy and PEG, and management of presumed sacral osteomyelitis.    PAST MEDICAL & SURGICAL HISTORY:  Diabetes  Rheumatoid arthritis  Fibromyalgia  Hypothyroid  Hypertension  H/O tracheostomy  PEG (percutaneous endoscopic gastrostomy) status  MEDICATIONS  (STANDING):  albuterol/ipratropium for Nebulization 3 milliLiter(s) Nebulizer every 6 hours  amLODIPine   Tablet 10 milliGRAM(s) Oral daily  artificial  tears Solution 2 Drop(s) Both EYES four times a day  ascorbic acid 500 milliGRAM(s) Oral daily  calcium carbonate    500 mG (Tums) Chewable 1 Tablet(s) Chew every 12 hours  cephalexin 500 milliGRAM(s) Oral every 6 hours  chlorhexidine 0.12% Liquid 15 milliLiter(s) Oral Mucosa every 12 hours  chlorhexidine 4% Liquid 1 Application(s) Topical <User Schedule>  ciprofloxacin   IVPB 400 milliGRAM(s) IV Intermittent every 12 hours  dextrose 50% Injectable 12.5 Gram(s) IV Push once  dextrose 50% Injectable 25 Gram(s) IV Push once  dextrose 50% Injectable 50 milliLiter(s) IV Push once  dextrose 50% Injectable 25 Gram(s) IV Push once  dextrose Oral Gel 15 Gram(s) Oral once  escitalopram 10 milliGRAM(s) Oral <User Schedule>  gabapentin Solution 100 milliGRAM(s) Enteral Tube at bedtime  glucagon  Injectable 1 milliGRAM(s) IntraMuscular once  hemorrhoidal Ointment      insulin glargine Injectable (LANTUS) 30 Unit(s) SubCutaneous every morning  insulin lispro (ADMELOG) corrective regimen sliding scale   SubCutaneous every 6 hours  insulin regular  human recombinant 17 Unit(s) SubCutaneous <User Schedule>  levothyroxine 125 MICROGram(s) Oral <User Schedule>  lidocaine   4% Patch 1 Patch Transdermal daily  melatonin 3 milliGRAM(s) Oral at bedtime  multivitamin/minerals/iron Oral Solution (CENTRUM) 15 milliLiter(s) Oral daily  nystatin Powder 1 Application(s) Topical every 8 hours  oxybutynin 5 milliGRAM(s) Oral daily  pantoprazole   Suspension 40 milliGRAM(s) Enteral Tube <User Schedule>  petrolatum Ophthalmic Ointment 1 Application(s) Both EYES four times a day  predniSONE   Tablet 5 milliGRAM(s) Oral daily  QUEtiapine 12.5 milliGRAM(s) Oral <User Schedule>  simethicone 80 milliGRAM(s) Chew four times a day  sodium chloride 3%  Inhalation 4 milliLiter(s) Inhalation every 12 hours  triamcinolone 0.1% Ointment 1 Application(s) Topical two times a day  zinc oxide 40% Paste 1 Application(s) Topical daily  MEDICATIONS  (PRN):  acetaminophen   Oral Liquid .. 1000 milliGRAM(s) Enteral Tube every 6 hours PRN Temp greater or equal to 38C (100.4F), Mild Pain (1 - 3)  benzocaine 20% Spray 1 Spray(s) Topical four times a day PRN Cough  diphenhydrAMINE Elixir 25 milliGRAM(s) Oral every 6 hours PRN Rash and/or Itching  guaifenesin/dextromethorphan Oral Liquid 10 milliLiter(s) Oral every 6 hours PRN Cough  sodium chloride 0.65% Nasal 1 Spray(s) Both Nostrils every 6 hours PRN Nasal Congestion  Allergies  penicillin (Unknown)  heparin (Unknown)  Tagamet (Unknown)  pineapple (Unknown)  walnut (Unknown)  Pecan, Filbert, Hazelnut (Unknown)  Lyrica (Unknown)  Intolerances    IOE:  permanent dentition: multiple carious teeth, multiple missing teeth           tongue/FOM: WNL           labial/buccal mucosa: WNL           (-) percussion           (+) palpation on alveolar ridge in edentulous site #4-5           (-) swelling    Traumatic ulcer noted on alveolar ridge in edentulous site #4-5.  Erythematous and edematous gingiva noted in area of #10-13 and 22-23.   Generalized wear and broken down dentition.  Generalized plaque buildup.     Radiographs: Unable to take radiographs because patient was non-transportable per med team. Patient's daughter emailed radiographs from dental visit in October 2023. Radiographs show multiple worn and fractured dentition.     ASSESSMENT:   Gingiva are erythematous throughout due to plaque buildup and poor OHI. No evidence of any swelling or acute infections that would lead to gingival pain. Traumatic ulcer in edentulous area #4-5 due to trauma from opposing dentition to the ridge.      PROCEDURE:  Limited bedside clinical exam with patient's verbal consent. Discussed findings with patient and patient's daughter, and all questions were answered. Patient is set to be discharged soon so patient's daughter inquired about full mouth extractions before being discharged from the hospital. Explained to patient's daughter we do not provide inpatient extractions unless there are surgeries planned that require intubation. Patient and daughter understood.     RECOMMENDATIONS:   1) Dental F/U with outpatient dentist or [facility] ([phone]) for comprehensive dental care.    Alvaro Jesus DDS Please refer to previous consult note for additional information.    Chikis Woods. (MRN: [id number]): 74 y/o female inpatient presents with bleeding from tracheostomy and PEG tube. Dental consulted to assess gum pain.     HPI:  74 y/o F with PMHx of T2DM, HTN, HLD, anemia, hypothyroidism, RA, fibromyalgia, remote cerebral aneurysm repair, acute hypercapnic respiratory failure now with tracheostomy, PEG tube, and bedbound (trach change 8/18, PEG change 8/14), sacral pressure ulcer, BIBEMS from home for 6 day hx of bleeding from the tracheostomy and PEG tube with associated abdominal pain. Patient still having regular bowel movements, last one occurred prior to ED arrival. Was seen outpatient on 10/26 by critical care Dr. Zaki Gottlieb and apparently had cultures sent at that time. Patient also noted to have chronic sacral decubitous ulcer. Family endorsed one episode of fever, though was not febrile on evaluation. Daughter noted patient was recently experiencing auditory and visual hallucinations (seeing animals that were not there & hearing a "bomb" going off outside her home), though patient not actively hallucinating on evaluation.  ED course notable for CT neck imaging showing no evidence of active bleeding around the tracheostomy site, no hematomas, and no drainable collection around the tracheostomy site. CT abdomen/pelvis notable for gastrostomy tube localized to the stomach with mild thickening noted along the tract; a sacral decubitus ulcer extending to the coccyx with osseous remodeling, possibly representing osteomyelitis; and bibasilar patchy opacities with volume loss in the lungs, representing atelectasis vs infection. Patient admitted for respiratory support, wound care of tracheostomy and PEG, and management of presumed sacral osteomyelitis.    PAST MEDICAL & SURGICAL HISTORY:  Diabetes  Rheumatoid arthritis  Fibromyalgia  Hypothyroid  Hypertension  H/O tracheostomy  PEG (percutaneous endoscopic gastrostomy) status  MEDICATIONS  (STANDING):  albuterol/ipratropium for Nebulization 3 milliLiter(s) Nebulizer every 6 hours  amLODIPine   Tablet 10 milliGRAM(s) Oral daily  artificial  tears Solution 2 Drop(s) Both EYES four times a day  ascorbic acid 500 milliGRAM(s) Oral daily  calcium carbonate    500 mG (Tums) Chewable 1 Tablet(s) Chew every 12 hours  cephalexin 500 milliGRAM(s) Oral every 6 hours  chlorhexidine 0.12% Liquid 15 milliLiter(s) Oral Mucosa every 12 hours  chlorhexidine 4% Liquid 1 Application(s) Topical <User Schedule>  ciprofloxacin   IVPB 400 milliGRAM(s) IV Intermittent every 12 hours  dextrose 50% Injectable 12.5 Gram(s) IV Push once  dextrose 50% Injectable 25 Gram(s) IV Push once  dextrose 50% Injectable 50 milliLiter(s) IV Push once  dextrose 50% Injectable 25 Gram(s) IV Push once  dextrose Oral Gel 15 Gram(s) Oral once  escitalopram 10 milliGRAM(s) Oral <User Schedule>  gabapentin Solution 100 milliGRAM(s) Enteral Tube at bedtime  glucagon  Injectable 1 milliGRAM(s) IntraMuscular once  hemorrhoidal Ointment      insulin glargine Injectable (LANTUS) 30 Unit(s) SubCutaneous every morning  insulin lispro (ADMELOG) corrective regimen sliding scale   SubCutaneous every 6 hours  insulin regular  human recombinant 17 Unit(s) SubCutaneous <User Schedule>  levothyroxine 125 MICROGram(s) Oral <User Schedule>  lidocaine   4% Patch 1 Patch Transdermal daily  melatonin 3 milliGRAM(s) Oral at bedtime  multivitamin/minerals/iron Oral Solution (CENTRUM) 15 milliLiter(s) Oral daily  nystatin Powder 1 Application(s) Topical every 8 hours  oxybutynin 5 milliGRAM(s) Oral daily  pantoprazole   Suspension 40 milliGRAM(s) Enteral Tube <User Schedule>  petrolatum Ophthalmic Ointment 1 Application(s) Both EYES four times a day  predniSONE   Tablet 5 milliGRAM(s) Oral daily  QUEtiapine 12.5 milliGRAM(s) Oral <User Schedule>  simethicone 80 milliGRAM(s) Chew four times a day  sodium chloride 3%  Inhalation 4 milliLiter(s) Inhalation every 12 hours  triamcinolone 0.1% Ointment 1 Application(s) Topical two times a day  zinc oxide 40% Paste 1 Application(s) Topical daily  MEDICATIONS  (PRN):  acetaminophen   Oral Liquid .. 1000 milliGRAM(s) Enteral Tube every 6 hours PRN Temp greater or equal to 38C (100.4F), Mild Pain (1 - 3)  benzocaine 20% Spray 1 Spray(s) Topical four times a day PRN Cough  diphenhydrAMINE Elixir 25 milliGRAM(s) Oral every 6 hours PRN Rash and/or Itching  guaifenesin/dextromethorphan Oral Liquid 10 milliLiter(s) Oral every 6 hours PRN Cough  sodium chloride 0.65% Nasal 1 Spray(s) Both Nostrils every 6 hours PRN Nasal Congestion  Allergies  penicillin (Unknown)  heparin (Unknown)  Tagamet (Unknown)  pineapple (Unknown)  walnut (Unknown)  Pecan, Filbert, Hazelnut (Unknown)  Lyrica (Unknown)  Intolerances    IOE:  permanent dentition: multiple carious teeth, multiple missing teeth           tongue/FOM: WNL           labial/buccal mucosa: WNL           (-) percussion           (+) palpation on alveolar ridge in edentulous site #4-5           (-) swelling    Traumatic ulcer noted on alveolar ridge in edentulous site #4-5.  Erythematous and edematous gingiva noted in area of #10-13 and 22-23.   Generalized wear and broken down dentition.  Generalized plaque buildup.     Radiographs: Unable to take radiographs because patient was non-transportable per med team. Patient's daughter emailed radiographs from dental visit in October 2023. Radiographs show multiple worn and fractured dentition.     ASSESSMENT:   Gingiva are erythematous throughout due to plaque buildup and poor OHI. No evidence of any swelling or acute infections that would lead to gingival pain. Traumatic ulcer in edentulous area #4-5 due to trauma from opposing dentition to the ridge.      PROCEDURE:  Limited bedside clinical exam with patient's verbal consent. Discussed findings with patient and patient's daughter, and all questions were answered. Patient is set to be discharged soon so patient's daughter inquired about full mouth extractions before being discharged from the hospital. Explained to patient's daughter we do not provide inpatient extractions unless there are surgeries planned that require intubation. Patient and daughter understood.     RECOMMENDATIONS:   1) Dental F/U with outpatient dentist or [facility] clinic ([phone]) for comprehensive dental care.    Alvaro Jesus DDS

## 2023-12-15 NOTE — CHART NOTE - NSCHARTNOTEFT_GEN_A_CORE
Nutrition Follow Up Note  Patient seen for: Nutrition Follow Up     Chart reviewed, events noted. Pt is a 72 yo F. Extended hospital course. See Pulmonology note 12/15 for additional details. Interim: pt completed antibiotics for osteo treatment. Noted with moderate amounts of secretions with blood on . Improved . Trach/PEG.     Source: [] Patient       [x] EMR        [x] RN        [] Family at bedside       [x] Other: interdisciplinary medical team    -If unable to interview patient: [x] Trach/Vent/BiPAP  [x] Disoriented/confused/inappropriate to interview    Diet Order:   Diet, NPO with Tube Feed:   Tube Feeding Modality: Gastrostomy  [employer] glucose support 1.2  Total Volume for 24 Hours (mL): 1200  Continuous  Starting Tube Feed Rate {mL per Hour}: 60  Increase Tube Feed Rate by (mL): 0  Until Goal Tube Feed Rate (mL per Hour): 60  Tube Feed Duration (in Hours): 20  Tube Feed Start Time: 06:00  Tube Feed Stop Time: 02:00  Free Water Flush  Pump   Rate (mL per Hour): 10   Frequency: Every 2 Hours  Alfred(7 Gm Arginine/7 Gm Glut/1.2 Gm HMB     Qty per Day:  2 (23)    EN order providing if 100% provision: 1440kcal (30kcal/kg) and 77g protein (1.6g/kg)    Is current diet order appropriate/adequate? [x] Yes  []  No:     PO intake :   [] >75%  Adequate    [] 50-75%  Fair       [] <50%  Poor    Nutrition-related concerns:  -Continues to receive free water 10ml q 2 hrs.   -Pt vegan, but per previous discussion with daughter, Alfred is okay. See nutrition note .   -A1c 7.5%. Blood glucose levels being monitored. Pt prescribed Humulin (11u, 9u and 24u), Lantus 18u q AM and insulin lispro sliding scale. To note, pt on Prednisone. Endocrinology team following. See NP note . Oral synthroid ordered and continues on 20 hour feeds.   -Pt received [employer] Peptide 1.5 at home per previous RD note.   -Prescribed vitamin C, Centrum to aid in wound healing (see below). Prescribed calcium carbonate.   -Last BM 12/15. Not on apparent bowel regimen. Prescribed Protonix.     Weights:   Dosing weight 54.6kg  10/29:  reports pt UBW 90 pounds prior to illness (~41kg).   Daily Weight in k.3 () 54.4 (), Daily Weight in k.3 (), Daily Weight in k.9 (), Daily Weight in k.9 ()  Pt with some weight gain since admission. noted with edema (see below).   RD will continue to monitor trends.   IBW + 10% 48kg    MEDICATIONS  (STANDING):  amLODIPine   Tablet  ascorbic acid  calcium carbonate    500 mG (Tums) Chewable  dextrose 50% Injectable  dextrose 50% Injectable  dextrose 50% Injectable  glucagon  Injectable  insulin glargine Injectable (LANTUS)  insulin lispro (ADMELOG) corrective regimen sliding scale  insulin regular  human recombinant  insulin regular  human recombinant  insulin regular  human recombinant  levothyroxine  multivitamin/minerals/iron Oral Solution (CENTRUM)  pantoprazole   Suspension  predniSONE   Tablet  simethicone    Pertinent Labs: 12-15 @ 06:38: Na 141, BUN 32<H>, Cr <0.30<L>, <H>, K+ 3.7, Phos 4.6<H>, Mg 2.0, Alk Phos 48, ALT/SGPT 20, AST/SGOT 21, HbA1c --    A1C with Estimated Average Glucose Result: 7.5 % (10-28-23 @ 07:18)    Finger Sticks:  POCT Blood Glucose.: 156 mg/dL (12-15 @ 11:40)  POCT Blood Glucose.: 135 mg/dL (12-15 @ 05:19)  POCT Blood Glucose.: 125 mg/dL ( @ 23:59)  POCT Blood Glucose.: 175 mg/dL ( @ 17:53)    Triglycerides, Serum: 192 mg/dL (23 @ 07:05)    Skin per nursing documentation: stage IV sacrum  Edema: 1+ generalized    Estimated Needs using IBW + 10% considering increased needs and edema   27-32kcal/kg 1296-1536kcal/day  1.3-1.6g/kg 62-77g protein/day  defer fluid needs to team    Nutrition Diagnosis: Increased Nutrient Needs  Nutrition Diagnosis is: [x] ongoing  [] resolved [] not applicable     New Nutrition Diagnosis: [x] Not applicable    Nutrition Care Plan:  [x] In Progress  [] Achieved  [] Not applicable    Nutrition Interventions:     Education Provided:       [] Yes:  [x] No:        Recommendations:      -Can continue [employer] Glucose Support at 60ml/hr x 20 hours to provide a total volume of 1200ml, 1440kcal (30kcal/kg), 77g protein (1.6g/kg). Update tube feed time: start 06:00 and run for 20 hours until 02:00. 20 hour feed will allow time for synthroid administration, noted to be ordered to be given at 05:00.   -Alfred will provide additional glutamine and arginine amino acids with collagen for wound healing. continue.   -Continue Vitamin C and Multivitamin to aid in wound healing.   -Defer free water flush to team    Monitoring and Evaluation:   Continue to monitor nutritional intake, tolerance to diet prescription, weights, labs, skin integrity    RD remains available upon request and will follow up per protocol    Hannah Rizvi RD, available via TEAMS Nutrition Follow Up Note  Patient seen for: Nutrition Follow Up     Chart reviewed, events noted. Pt is a 72 yo F. Extended hospital course. See Pulmonology note 12/15 for additional details. Interim: pt completed antibiotics for osteo treatment. Noted with moderate amounts of secretions with blood on . Improved . Trach/PEG.     Source: [] Patient       [x] EMR        [x] RN        [] Family at bedside       [x] Other: interdisciplinary medical team    -If unable to interview patient: [x] Trach/Vent/BiPAP  [x] Disoriented/confused/inappropriate to interview    Diet Order:   Diet, NPO with Tube Feed:   Tube Feeding Modality: Gastrostomy  [employer] glucose support 1.2  Total Volume for 24 Hours (mL): 1200  Continuous  Starting Tube Feed Rate {mL per Hour}: 60  Increase Tube Feed Rate by (mL): 0  Until Goal Tube Feed Rate (mL per Hour): 60  Tube Feed Duration (in Hours): 20  Tube Feed Start Time: 06:00  Tube Feed Stop Time: 02:00  Free Water Flush  Pump   Rate (mL per Hour): 10   Frequency: Every 2 Hours  Alfred(7 Gm Arginine/7 Gm Glut/1.2 Gm HMB     Qty per Day:  2 (23)    EN order providing if 100% provision: 1440kcal (30kcal/kg) and 77g protein (1.6g/kg)    Is current diet order appropriate/adequate? [x] Yes  []  No:     PO intake :   [] >75%  Adequate    [] 50-75%  Fair       [] <50%  Poor    Nutrition-related concerns:  -Continues to receive free water 10ml q 2 hrs.   -Pt vegan, but per previous discussion with daughter, Alfred is okay. See nutrition note .   -A1c 7.5%. Blood glucose levels being monitored. Pt prescribed Humulin (11u, 9u and 24u), Lantus 18u q AM and insulin lispro sliding scale. To note, pt on Prednisone. Endocrinology team following. See NP note . Oral synthroid ordered and continues on 20 hour feeds.   -Pt received [employer] Peptide 1.5 at home per previous RD note.   -Prescribed vitamin C, Centrum to aid in wound healing (see below). Prescribed calcium carbonate.   -Last BM 12/15. Not on apparent bowel regimen. Prescribed Protonix.     Weights:   Dosing weight 54.6kg  10/29:  reports pt UBW 90 pounds prior to illness (~41kg).   Daily Weight in k.3 () 54.4 (), Daily Weight in k.3 (), Daily Weight in k.9 (), Daily Weight in k.9 ()  Pt with some weight gain since admission. noted with edema (see below).   RD will continue to monitor trends.   IBW + 10% 48kg    MEDICATIONS  (STANDING):  amLODIPine   Tablet  ascorbic acid  calcium carbonate    500 mG (Tums) Chewable  dextrose 50% Injectable  dextrose 50% Injectable  dextrose 50% Injectable  glucagon  Injectable  insulin glargine Injectable (LANTUS)  insulin lispro (ADMELOG) corrective regimen sliding scale  insulin regular  human recombinant  insulin regular  human recombinant  insulin regular  human recombinant  levothyroxine  multivitamin/minerals/iron Oral Solution (CENTRUM)  pantoprazole   Suspension  predniSONE   Tablet  simethicone    Pertinent Labs: 12-15 @ 06:38: Na 141, BUN 32<H>, Cr <0.30<L>, <H>, K+ 3.7, Phos 4.6<H>, Mg 2.0, Alk Phos 48, ALT/SGPT 20, AST/SGOT 21, HbA1c --    A1C with Estimated Average Glucose Result: 7.5 % (10-28-23 @ 07:18)    Finger Sticks:  POCT Blood Glucose.: 156 mg/dL (12-15 @ 11:40)  POCT Blood Glucose.: 135 mg/dL (12-15 @ 05:19)  POCT Blood Glucose.: 125 mg/dL ( @ 23:59)  POCT Blood Glucose.: 175 mg/dL ( @ 17:53)    Triglycerides, Serum: 192 mg/dL (23 @ 07:05)    Skin per nursing documentation: stage IV sacrum  Edema: 1+ generalized    Estimated Needs using IBW + 10% considering increased needs and edema   27-32kcal/kg 1296-1536kcal/day  1.3-1.6g/kg 62-77g protein/day  defer fluid needs to team    Nutrition Diagnosis: Increased Nutrient Needs  Nutrition Diagnosis is: [x] ongoing  [] resolved [] not applicable     New Nutrition Diagnosis: [x] Not applicable    Nutrition Care Plan:  [x] In Progress  [] Achieved  [] Not applicable    Nutrition Interventions:     Education Provided:       [] Yes:  [x] No:        Recommendations:      -Can continue [employer] Glucose Support at 60ml/hr x 20 hours to provide a total volume of 1200ml, 1440kcal (30kcal/kg), 77g protein (1.6g/kg). Update tube feed time: start 06:00 and run for 20 hours until 02:00. 20 hour feed will allow time for synthroid administration, noted to be ordered to be given at 05:00.   -Alfred will provide additional glutamine and arginine amino acids with collagen for wound healing. continue.   -Continue Vitamin C and Multivitamin to aid in wound healing.   -Defer free water flush to team    Monitoring and Evaluation:   Continue to monitor nutritional intake, tolerance to diet prescription, weights, labs, skin integrity    RD remains available upon request and will follow up per protocol    Hannah Rizvi RD, available via TEAMS

## 2023-12-15 NOTE — PROGRESS NOTE ADULT - TIME BILLING
decision making as above, discussion w/ interdisciplinary team, review of the medical record, labs, imaging

## 2023-12-15 NOTE — PROGRESS NOTE ADULT - SUBJECTIVE AND OBJECTIVE BOX
ENDOCRINE FOLLOW UP     Chief Complaint: dm2    History: Patient seen earlier this morning. Aid at bedside. BG levels at present in range. Tube feeds running at goal rate.     MEDICATIONS  (STANDING):  albuterol/ipratropium for Nebulization 3 milliLiter(s) Nebulizer every 6 hours  amLODIPine   Tablet 10 milliGRAM(s) Oral daily  artificial  tears Solution 2 Drop(s) Both EYES four times a day  ascorbic acid 500 milliGRAM(s) Oral daily  calcium carbonate    500 mG (Tums) Chewable 1 Tablet(s) Chew every 12 hours  chlorhexidine 0.12% Liquid 15 milliLiter(s) Oral Mucosa every 12 hours  chlorhexidine 4% Liquid 1 Application(s) Topical <User Schedule>  dextrose 50% Injectable 12.5 Gram(s) IV Push once  dextrose 50% Injectable 50 milliLiter(s) IV Push once  dextrose 50% Injectable 25 Gram(s) IV Push once  escitalopram 10 milliGRAM(s) Oral <User Schedule>  fentaNYL   Patch  25 MICROgram(s)/Hr 1 Patch Transdermal every 72 hours  gabapentin Solution 100 milliGRAM(s) Enteral Tube at bedtime  glucagon  Injectable 1 milliGRAM(s) IntraMuscular once  hemorrhoidal Ointment 1 Application(s) Rectal daily  hemorrhoidal Ointment      insulin glargine Injectable (LANTUS) 18 Unit(s) SubCutaneous every morning  insulin lispro (ADMELOG) corrective regimen sliding scale   SubCutaneous every 6 hours  insulin regular  human recombinant 11 Unit(s) SubCutaneous <User Schedule>  insulin regular  human recombinant 9 Unit(s) SubCutaneous <User Schedule>  insulin regular  human recombinant 24 Unit(s) SubCutaneous <User Schedule>  levothyroxine 125 MICROGram(s) Oral <User Schedule>  lidocaine   4% Patch 1 Patch Transdermal daily  melatonin 1 milliGRAM(s) Oral at bedtime  melatonin 5 milliGRAM(s) Oral at bedtime  multivitamin/minerals/iron Oral Solution (CENTRUM) 15 milliLiter(s) Oral daily  nystatin Powder 1 Application(s) Topical every 8 hours  pantoprazole   Suspension 40 milliGRAM(s) Enteral Tube <User Schedule>  petrolatum Ophthalmic Ointment 1 Application(s) Both EYES four times a day  predniSONE   Tablet 5 milliGRAM(s) Oral daily  QUEtiapine 12.5 milliGRAM(s) Oral <User Schedule>  simethicone 80 milliGRAM(s) Chew four times a day  zinc oxide 40% Paste 1 Application(s) Topical daily    MEDICATIONS  (PRN):  acetaminophen   Oral Liquid .. 1000 milliGRAM(s) Enteral Tube every 6 hours PRN Temp greater or equal to 38C (100.4F), Mild Pain (1 - 3)  benzocaine 20% Spray 1 Spray(s) Topical four times a day PRN Cough  diclofenac sodium 1% Gel 2 Gram(s) Topical four times a day PRN pain  diphenhydrAMINE Elixir 25 milliGRAM(s) Oral every 6 hours PRN Rash and/or Itching  guaifenesin/dextromethorphan Oral Liquid 10 milliLiter(s) Oral every 6 hours PRN Cough  oxyCODONE    Solution 2.5 milliGRAM(s) Oral every 6 hours PRN Moderate Pain (4 - 6)  sodium chloride 0.65% Nasal 1 Spray(s) Both Nostrils every 6 hours PRN Nasal Congestion      Allergies    penicillin (Unknown)  heparin (Unknown)  Tagamet (Unknown)  pineapple (Unknown)  walnut (Unknown)  Pecan, Filbert, Hazelnut (Unknown)  Lyrica (Unknown)    Intolerances        ROS: Unable to obtain     PHYSICAL EXAM:  VITALS: T(C): 36.8 (12-15-23 @ 12:05)  T(F): 98.2 (12-15-23 @ 12:05), Max: 100.8 (12-14-23 @ 18:20)  HR: 72 (12-15-23 @ 12:18) (69 - 90)  BP: 113/62 (12-15-23 @ 12:05) (101/52 - 146/63)  RR:  (20 - 20)  SpO2:  (98% - 100%)  Wt(kg): --  GENERAL: NAD, resting comfortably   HEENT:  Atraumatic, Normocephalic, moist mucous membranes  RESPIRATORY: trach to vent, nonlabored respirations   GI: Soft, nontender, non distended, +peg  NEURO: AOx0-1, arousable, not following commands/responding to questions   PSYCH:  reactive affect, euthymic mood    POCT Blood Glucose.: 156 mg/dL (12-15-23 @ 11:40)  POCT Blood Glucose.: 135 mg/dL (12-15-23 @ 05:19)  POCT Blood Glucose.: 125 mg/dL (12-14-23 @ 23:59)  POCT Blood Glucose.: 175 mg/dL (12-14-23 @ 17:53)  POCT Blood Glucose.: 113 mg/dL (12-14-23 @ 11:41)  POCT Blood Glucose.: 113 mg/dL (12-14-23 @ 05:51)  POCT Blood Glucose.: 152 mg/dL (12-14-23 @ 00:51)  POCT Blood Glucose.: 115 mg/dL (12-13-23 @ 17:33)  POCT Blood Glucose.: 186 mg/dL (12-13-23 @ 11:54)  POCT Blood Glucose.: 164 mg/dL (12-13-23 @ 05:20)  POCT Blood Glucose.: 124 mg/dL (12-13-23 @ 00:06)  POCT Blood Glucose.: 127 mg/dL (12-12-23 @ 18:12)    eMAR:  insulin glargine Injectable (LANTUS)   18 Unit(s) SubCutaneous (12-15-23 @ 09:43)    insulin lispro (ADMELOG) corrective regimen sliding scale   1 Unit(s) SubCutaneous (12-15-23 @ 12:40)   0 Unit(s) SubCutaneous (12-15-23 @ 05:57)   0 Unit(s) SubCutaneous (12-15-23 @ 00:15)   1 Unit(s) SubCutaneous (12-14-23 @ 18:16)    insulin regular  human recombinant   9 Unit(s) SubCutaneous (12-14-23 @ 18:15)    insulin regular  human recombinant   11 Unit(s) SubCutaneous (12-15-23 @ 12:39)    insulin regular  human recombinant   24 Unit(s) SubCutaneous (12-15-23 @ 05:57)    levothyroxine   125 MICROGram(s) Oral (12-15-23 @ 06:05)    predniSONE   Tablet   5 milliGRAM(s) Oral (12-15-23 @ 06:57)        12-15    141  |  102  |  32<H>  ----------------------------<  123<H>  3.7   |  29  |  <0.30<L>    eGFR: 113    Ca    9.7      12-15  Mg     2.0     12-15  Phos  4.6     12-15    TPro  7.1  /  Alb  3.3  /  TBili  0.3  /  DBili  x   /  AST  21  /  ALT  20  /  AlkPhos  48  12-15      A1C with Estimated Average Glucose Result: 7.5 % (10-28-23 @ 07:18)      Thyroid Stimulating Hormone, Serum: 1.57 uIU/mL (12-04-23 @ 07:01)  Thyroid Stimulating Hormone, Serum: 2.80 uIU/mL (11-28-23 @ 06:37)

## 2023-12-15 NOTE — PROGRESS NOTE ADULT - PROBLEM SELECTOR PLAN 2
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   - S/p CT abd/pelvis (10/28): Sacral decubitus ulcer extending to the coccyx with osseous remodeling and pelvic MRI or bone scan to recc to exclude osteomyelitis.  - 10/30 wound care note: Sacral stage 4 pressure injury 4.5cm x 2.5cm x 1.5cm w/ lip of undermining circumferentially greatest 9-12 of 3cm.  - MRI pelvis 10/30 with Osteomyelitis, completed Cefepime for 7 days, Vancomycin d/marli   - 11/10: resumed Cipro 500mg q 12 and Keflex 500mg q 6 until 12/10.  - 11/20: Changed to IV Cipro 400 q12 as recommended by ID pharmacist due to multiple drug interactions when given via PEG  - 11/28 wound care note: Sacral stage 4pressure injury 4.5cm x 2.5cm x 0.5cm, moist granular wound bed   palpable but not exposed bone no blistering, (+) serosanguinous drainage. No odor, erythema, increased warmth, tenderness, induration, fluctuance, nor crepitus.  - 12/3 wound culture ordered - d/c'd as no need for culture, wound improving, no signs of infection  - 12/10 completed cipro and keflex x5 week course.  - wound care following

## 2023-12-15 NOTE — PROGRESS NOTE ADULT - NS ATTEND AMEND GEN_ALL_CORE FT
73F with a h/o DM, HTN, HLD, anemia, hypothyroidism, RA, fibromyalgia, remote cerebral aneurysm with repair, hypercapnic respiratory failure s/p tracheostomy/PEG, bedbound, sacral pressure ulcer. Admitted w/ bleeding from tracheostomy and PEG w/ associated abdominal pain, recent hx of auditory/visual hallucinations.   Imaging showed mild thickening along PEG tube tract and sacral ulcer extending to coccyx suggestive of acute osteomyelitis.      # Osteomyelitis  # Chronic hypoxemic RF  # oropharyngeal dysphagia  # acute on chronic pain  # Trach/Peg bleeding  # Tracheitis with Pseudomonas and serratia  # Hx of hallucination, anxiety     #Neuro - awake, alert, and interactive. moving all extremities, mouthing words  Continue current medications  - Behavioral Health Follow-up as needed   #Cardiovascular - hemodynamically stable  #Pulmonary - chronic hypoxemic respiratory failure, has 8XLT trach, on home vent, pressure support trials daily 15/5  - noted to have blood mixed with sputum upon suctioning. FiO2 requirements remain low - 30%FiO2 saturating well.  dc hypertonic saline in case contributing to irritation, minimize suctioning as tolerated.  labs appreciated no sig thrombocytopenia, 1 dose TXA neb. Improving today  #Gastro - oropharyngeal dysphagia with PEG tube in place, tolerating feeds, nutrition follow up appreciated   Abdominal sono 12/1 and CT abdomen 12/4- No acute intra-abdominal findings.   #Infectious Disease - sacral osteo on MRI - wound care follow-up appreciated, c/w offloading and local wound care  -The patient has had this wound since a hospitalization in December 2022 and per daughter does not have chronic or multiple wounds but only this single wound.   - sp 6 week course of Cipro and Keflex as per ID - completed 12/10/23  - Per daughter, patient has had prior multiple infections during hospitalizations including prior pseudomonas, MRSA, E faecalis, and Klebsiella  - Sputum colonized with MDR Pseudomonas   - Daughter reports a recent dental abscess and being told by outpatient dentist that patient needed teeth extracted - Dental evaluation noted with no plans for intervention as inpatient.   - febrile to 100.8 overnight, blood cultures sent   #Hem/Onc - prior cytopenias in setting of antibiotics per patient's daughter   - Hem/Onc follow-up noted - suspect an anemia of chronic disease  #Pain - continue current pain control - in setting of fibromyalgia and pain from wound  - Voltaren topical, lidocaine patches and low dose Oxycodone as needed  #Derm - previously seen by derm for skin lesions - mid back with irritated seborrheic keratosis - outpatient follow-up  #Endo - DM2 - continue insulin and adjust as needed for goal FS < 180  - Endocrine follow-up appreciated   - Continue Synthroid - TSH WNL  #DVT proph - SCDs  - Prior prophylactic AC stopped after concern for prior bleeding.

## 2023-12-15 NOTE — PROGRESS NOTE ADULT - SUBJECTIVE AND OBJECTIVE BOX
Patient is a 73y old  Female who presents with a chief complaint of bleeding (04 Dec 2023 16:57)      Interval Events:    REVIEW OF SYSTEMS:  [ ] Positive  [ ] All other systems negative  [ ] Unable to assess ROS because ________    Vital Signs Last 24 Hrs  T(C): 36.3 (12-15-23 @ 05:36), Max: 38.2 (12-14-23 @ 18:20)  T(F): 97.4 (12-15-23 @ 05:36), Max: 100.8 (12-14-23 @ 18:20)  HR: 69 (12-15-23 @ 06:20) (69 - 90)  BP: 137/63 (12-15-23 @ 05:36) (101/52 - 146/63)  RR: 20 (12-15-23 @ 05:36) (20 - 20)  SpO2: 100% (12-15-23 @ 06:20) (98% - 100%)    PHYSICAL EXAM:  HEENT:   [ ]Tracheostomy:  [ ]Pupils equal  [ ]No oral lesions  [ ]Abnormal    SKIN  [ ]No Rash  [ ] Abnormal  [ ] pressure    CARDIAC  [ ]Regular  [ ]Abnormal    PULMONARY  [ ]Bilateral Clear Breath Sounds  [ ]Normal Excursion  [ ]Abnormal    GI  [ ]PEG      [ ] +BS		              [ ]Soft, nondistended, nontender	  [ ]Abnormal    MUSCULOSKELETAL                                   [ ]Bedbound                 [ ]Abnormal    [ ]Ambulatory/OOB to chair                           EXTREMITIES                                         [ ]Normal  [ ]Edema                           NEUROLOGIC  [ ] Normal, non focal  [ ] Focal findings:    PSYCHIATRIC  [ ]Alert and appropriate  [ ] Sedated	 [ ]Agitated    :  Mcbride: [ ] Yes, if yes: Date of Placement:                   [  ] No    LINES: Central Lines [ ] Yes, if yes: Date of Placement                                     [  ] No    HOSPITAL MEDICATIONS:  MEDICATIONS  (STANDING):  albuterol/ipratropium for Nebulization 3 milliLiter(s) Nebulizer every 6 hours  amLODIPine   Tablet 10 milliGRAM(s) Oral daily  artificial  tears Solution 2 Drop(s) Both EYES four times a day  ascorbic acid 500 milliGRAM(s) Oral daily  calcium carbonate    500 mG (Tums) Chewable 1 Tablet(s) Chew every 12 hours  chlorhexidine 0.12% Liquid 15 milliLiter(s) Oral Mucosa every 12 hours  chlorhexidine 4% Liquid 1 Application(s) Topical <User Schedule>  dextrose 50% Injectable 12.5 Gram(s) IV Push once  dextrose 50% Injectable 25 Gram(s) IV Push once  dextrose 50% Injectable 50 milliLiter(s) IV Push once  escitalopram 10 milliGRAM(s) Oral <User Schedule>  fentaNYL   Patch  25 MICROgram(s)/Hr 1 Patch Transdermal every 72 hours  gabapentin Solution 100 milliGRAM(s) Enteral Tube at bedtime  glucagon  Injectable 1 milliGRAM(s) IntraMuscular once  hemorrhoidal Ointment 1 Application(s) Rectal daily  hemorrhoidal Ointment      insulin glargine Injectable (LANTUS) 18 Unit(s) SubCutaneous every morning  insulin lispro (ADMELOG) corrective regimen sliding scale   SubCutaneous every 6 hours  insulin regular  human recombinant 9 Unit(s) SubCutaneous <User Schedule>  insulin regular  human recombinant 11 Unit(s) SubCutaneous <User Schedule>  insulin regular  human recombinant 24 Unit(s) SubCutaneous <User Schedule>  levothyroxine 125 MICROGram(s) Oral <User Schedule>  lidocaine   4% Patch 1 Patch Transdermal daily  melatonin 5 milliGRAM(s) Oral at bedtime  melatonin 1 milliGRAM(s) Oral at bedtime  multivitamin/minerals/iron Oral Solution (CENTRUM) 15 milliLiter(s) Oral daily  nystatin Powder 1 Application(s) Topical every 8 hours  pantoprazole   Suspension 40 milliGRAM(s) Enteral Tube <User Schedule>  petrolatum Ophthalmic Ointment 1 Application(s) Both EYES four times a day  predniSONE   Tablet 5 milliGRAM(s) Oral daily  QUEtiapine 12.5 milliGRAM(s) Oral <User Schedule>  simethicone 80 milliGRAM(s) Chew four times a day  zinc oxide 40% Paste 1 Application(s) Topical daily    MEDICATIONS  (PRN):  acetaminophen   Oral Liquid .. 1000 milliGRAM(s) Enteral Tube every 6 hours PRN Temp greater or equal to 38C (100.4F), Mild Pain (1 - 3)  benzocaine 20% Spray 1 Spray(s) Topical four times a day PRN Cough  diclofenac sodium 1% Gel 2 Gram(s) Topical four times a day PRN pain  diphenhydrAMINE Elixir 25 milliGRAM(s) Oral every 6 hours PRN Rash and/or Itching  guaifenesin/dextromethorphan Oral Liquid 10 milliLiter(s) Oral every 6 hours PRN Cough  oxyCODONE    Solution 2.5 milliGRAM(s) Oral every 6 hours PRN Moderate Pain (4 - 6)  sodium chloride 0.65% Nasal 1 Spray(s) Both Nostrils every 6 hours PRN Nasal Congestion      LABS:                        9.2    8.28  )-----------( 107      ( 15 Dec 2023 06:38 )             30.9     12-15    141  |  102  |  32<H>  ----------------------------<  123<H>  3.7   |  29  |  <0.30<L>    Ca    9.7      15 Dec 2023 06:38  Phos  4.6     12-15  Mg     2.0     12-15    TPro  7.1  /  Alb  3.3  /  TBili  0.3  /  DBili  x   /  AST  21  /  ALT  20  /  AlkPhos  48  12-15      Urinalysis Basic - ( 15 Dec 2023 06:38 )    Color: x / Appearance: x / SG: x / pH: x  Gluc: 123 mg/dL / Ketone: x  / Bili: x / Urobili: x   Blood: x / Protein: x / Nitrite: x   Leuk Esterase: x / RBC: x / WBC x   Sq Epi: x / Non Sq Epi: x / Bacteria: x          CAPILLARY BLOOD GLUCOSE    MICROBIOLOGY:     RADIOLOGY:  [ ] Reviewed and interpreted by me    Mode: AC/ CMV (Assist Control/ Continuous Mandatory Ventilation)  RR (machine): 12  TV (machine): 300  FiO2: 30  PEEP: 5  ITime: 1  MAP: 8  PIP: 29   Patient is a 73y old  Female who presents with a chief complaint of bleeding (04 Dec 2023 16:57)      Interval Events: Fever of 100.8F yesterday evening.     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    CARDIAC  [X]Regular  [ ]Abnormal    PULMONARY  [ ]Bilateral Clear Breath Sounds  [ ]Normal Excursion  [X]Abnormal - BL Coarse BS    GI  [X]PEG      [X] +BS		              [X]Soft, nondistended, nontender	  [ ]Abnormal    MUSCULOSKELETAL                                   [X]Bedbound                 [ ]Abnormal    [ ]Ambulatory/OOB to chair                           EXTREMITIES                                         [X]Normal  [ ]Edema                           NEUROLOGIC  [ ] Normal, non focal  [X] Focal findings: opens eyes spontaneously, follows commands on all 4 extremities    PSYCHIATRIC  [X]Alert and appropriate  [ ] Sedated	 [ ]Agitated    :  Mcbride: [ ] Yes, if yes: Date of Placement:                   [X] No    LINES: Central Lines [ ] Yes, if yes: Date of Placement                                     [X] No      HOSPITAL MEDICATIONS:  MEDICATIONS  (STANDING):  albuterol/ipratropium for Nebulization 3 milliLiter(s) Nebulizer every 6 hours  amLODIPine   Tablet 10 milliGRAM(s) Oral daily  artificial  tears Solution 2 Drop(s) Both EYES four times a day  ascorbic acid 500 milliGRAM(s) Oral daily  calcium carbonate    500 mG (Tums) Chewable 1 Tablet(s) Chew every 12 hours  chlorhexidine 0.12% Liquid 15 milliLiter(s) Oral Mucosa every 12 hours  chlorhexidine 4% Liquid 1 Application(s) Topical <User Schedule>  dextrose 50% Injectable 12.5 Gram(s) IV Push once  dextrose 50% Injectable 25 Gram(s) IV Push once  dextrose 50% Injectable 50 milliLiter(s) IV Push once  escitalopram 10 milliGRAM(s) Oral <User Schedule>  fentaNYL   Patch  25 MICROgram(s)/Hr 1 Patch Transdermal every 72 hours  gabapentin Solution 100 milliGRAM(s) Enteral Tube at bedtime  glucagon  Injectable 1 milliGRAM(s) IntraMuscular once  hemorrhoidal Ointment 1 Application(s) Rectal daily  hemorrhoidal Ointment      insulin glargine Injectable (LANTUS) 18 Unit(s) SubCutaneous every morning  insulin lispro (ADMELOG) corrective regimen sliding scale   SubCutaneous every 6 hours  insulin regular  human recombinant 9 Unit(s) SubCutaneous <User Schedule>  insulin regular  human recombinant 11 Unit(s) SubCutaneous <User Schedule>  insulin regular  human recombinant 24 Unit(s) SubCutaneous <User Schedule>  levothyroxine 125 MICROGram(s) Oral <User Schedule>  lidocaine   4% Patch 1 Patch Transdermal daily  melatonin 5 milliGRAM(s) Oral at bedtime  melatonin 1 milliGRAM(s) Oral at bedtime  multivitamin/minerals/iron Oral Solution (CENTRUM) 15 milliLiter(s) Oral daily  nystatin Powder 1 Application(s) Topical every 8 hours  pantoprazole   Suspension 40 milliGRAM(s) Enteral Tube <User Schedule>  petrolatum Ophthalmic Ointment 1 Application(s) Both EYES four times a day  predniSONE   Tablet 5 milliGRAM(s) Oral daily  QUEtiapine 12.5 milliGRAM(s) Oral <User Schedule>  simethicone 80 milliGRAM(s) Chew four times a day  zinc oxide 40% Paste 1 Application(s) Topical daily    MEDICATIONS  (PRN):  acetaminophen   Oral Liquid .. 1000 milliGRAM(s) Enteral Tube every 6 hours PRN Temp greater or equal to 38C (100.4F), Mild Pain (1 - 3)  benzocaine 20% Spray 1 Spray(s) Topical four times a day PRN Cough  diclofenac sodium 1% Gel 2 Gram(s) Topical four times a day PRN pain  diphenhydrAMINE Elixir 25 milliGRAM(s) Oral every 6 hours PRN Rash and/or Itching  guaifenesin/dextromethorphan Oral Liquid 10 milliLiter(s) Oral every 6 hours PRN Cough  oxyCODONE    Solution 2.5 milliGRAM(s) Oral every 6 hours PRN Moderate Pain (4 - 6)  sodium chloride 0.65% Nasal 1 Spray(s) Both Nostrils every 6 hours PRN Nasal Congestion      LABS:                        9.2    8.28  )-----------( 107      ( 15 Dec 2023 06:38 )             30.9     12-15    141  |  102  |  32<H>  ----------------------------<  123<H>  3.7   |  29  |  <0.30<L>    Ca    9.7      15 Dec 2023 06:38  Phos  4.6     12-15  Mg     2.0     12-15    TPro  7.1  /  Alb  3.3  /  TBili  0.3  /  DBili  x   /  AST  21  /  ALT  20  /  AlkPhos  48  12-15      Urinalysis Basic - ( 15 Dec 2023 06:38 )    Color: x / Appearance: x / SG: x / pH: x  Gluc: 123 mg/dL / Ketone: x  / Bili: x / Urobili: x   Blood: x / Protein: x / Nitrite: x   Leuk Esterase: x / RBC: x / WBC x   Sq Epi: x / Non Sq Epi: x / Bacteria: x          CAPILLARY BLOOD GLUCOSE    MICROBIOLOGY:     RADIOLOGY:  [ ] Reviewed and interpreted by me    Mode: AC/ CMV (Assist Control/ Continuous Mandatory Ventilation)  RR (machine): 12  TV (machine): 300  FiO2: 30  PEEP: 5  ITime: 1  MAP: 8  PIP: 29   Patient is a 73y old  Female who presents with a chief complaint of bleeding (04 Dec 2023 16:57)      Interval Events: Fever of 100.8F yesterday evening.     REVIEW OF SYSTEMS:  [X] Positive:  Toothache  [  ] All other systems negative  [] Unable to assess ROS because __      Vital Signs Last 24 Hrs  T(C): 36.3 (12-15-23 @ 05:36), Max: 38.2 (12-14-23 @ 18:20)  T(F): 97.4 (12-15-23 @ 05:36), Max: 100.8 (12-14-23 @ 18:20)  HR: 69 (12-15-23 @ 06:20) (69 - 90)  BP: 137/63 (12-15-23 @ 05:36) (101/52 - 146/63)  RR: 20 (12-15-23 @ 05:36) (20 - 20)  SpO2: 100% (12-15-23 @ 06:20) (98% - 100%)    PHYSICAL EXAM:  HEENT:   [X]Tracheostomy: #8 distal XLT Shiley  [X]Pupils equal  [ ]No oral lesions  [X] Abnormal: missing dentition; +teeth caries; +loose bottom teeth    SKIN  [ ]No Rash  [ ] Abnormal  [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    CARDIAC  [X]Regular  [ ]Abnormal    PULMONARY  [ ]Bilateral Clear Breath Sounds  [ ]Normal Excursion  [X]Abnormal - BL Coarse BS    GI  [X]PEG      [X] +BS		              [X]Soft, nondistended, nontender	  [ ]Abnormal    MUSCULOSKELETAL                                   [X]Bedbound                 [ ]Abnormal    [ ]Ambulatory/OOB to chair                           EXTREMITIES                                         [X]Normal  [ ]Edema                           NEUROLOGIC  [ ] Normal, non focal  [X] Focal findings: opens eyes spontaneously, follows commands on all 4 extremities    PSYCHIATRIC  [X]Alert and appropriate  [ ] Sedated	 [ ]Agitated    :  Mcbride: [ ] Yes, if yes: Date of Placement:                   [X] No    LINES: Central Lines [ ] Yes, if yes: Date of Placement                                     [X] No      HOSPITAL MEDICATIONS:  MEDICATIONS  (STANDING):  albuterol/ipratropium for Nebulization 3 milliLiter(s) Nebulizer every 6 hours  amLODIPine   Tablet 10 milliGRAM(s) Oral daily  artificial  tears Solution 2 Drop(s) Both EYES four times a day  ascorbic acid 500 milliGRAM(s) Oral daily  calcium carbonate    500 mG (Tums) Chewable 1 Tablet(s) Chew every 12 hours  chlorhexidine 0.12% Liquid 15 milliLiter(s) Oral Mucosa every 12 hours  chlorhexidine 4% Liquid 1 Application(s) Topical <User Schedule>  dextrose 50% Injectable 12.5 Gram(s) IV Push once  dextrose 50% Injectable 25 Gram(s) IV Push once  dextrose 50% Injectable 50 milliLiter(s) IV Push once  escitalopram 10 milliGRAM(s) Oral <User Schedule>  fentaNYL   Patch  25 MICROgram(s)/Hr 1 Patch Transdermal every 72 hours  gabapentin Solution 100 milliGRAM(s) Enteral Tube at bedtime  glucagon  Injectable 1 milliGRAM(s) IntraMuscular once  hemorrhoidal Ointment 1 Application(s) Rectal daily  hemorrhoidal Ointment      insulin glargine Injectable (LANTUS) 18 Unit(s) SubCutaneous every morning  insulin lispro (ADMELOG) corrective regimen sliding scale   SubCutaneous every 6 hours  insulin regular  human recombinant 9 Unit(s) SubCutaneous <User Schedule>  insulin regular  human recombinant 11 Unit(s) SubCutaneous <User Schedule>  insulin regular  human recombinant 24 Unit(s) SubCutaneous <User Schedule>  levothyroxine 125 MICROGram(s) Oral <User Schedule>  lidocaine   4% Patch 1 Patch Transdermal daily  melatonin 5 milliGRAM(s) Oral at bedtime  melatonin 1 milliGRAM(s) Oral at bedtime  multivitamin/minerals/iron Oral Solution (CENTRUM) 15 milliLiter(s) Oral daily  nystatin Powder 1 Application(s) Topical every 8 hours  pantoprazole   Suspension 40 milliGRAM(s) Enteral Tube <User Schedule>  petrolatum Ophthalmic Ointment 1 Application(s) Both EYES four times a day  predniSONE   Tablet 5 milliGRAM(s) Oral daily  QUEtiapine 12.5 milliGRAM(s) Oral <User Schedule>  simethicone 80 milliGRAM(s) Chew four times a day  zinc oxide 40% Paste 1 Application(s) Topical daily    MEDICATIONS  (PRN):  acetaminophen   Oral Liquid .. 1000 milliGRAM(s) Enteral Tube every 6 hours PRN Temp greater or equal to 38C (100.4F), Mild Pain (1 - 3)  benzocaine 20% Spray 1 Spray(s) Topical four times a day PRN Cough  diclofenac sodium 1% Gel 2 Gram(s) Topical four times a day PRN pain  diphenhydrAMINE Elixir 25 milliGRAM(s) Oral every 6 hours PRN Rash and/or Itching  guaifenesin/dextromethorphan Oral Liquid 10 milliLiter(s) Oral every 6 hours PRN Cough  oxyCODONE    Solution 2.5 milliGRAM(s) Oral every 6 hours PRN Moderate Pain (4 - 6)  sodium chloride 0.65% Nasal 1 Spray(s) Both Nostrils every 6 hours PRN Nasal Congestion      LABS:                        9.2    8.28  )-----------( 107      ( 15 Dec 2023 06:38 )             30.9     12-15    141  |  102  |  32<H>  ----------------------------<  123<H>  3.7   |  29  |  <0.30<L>    Ca    9.7      15 Dec 2023 06:38  Phos  4.6     12-15  Mg     2.0     12-15    TPro  7.1  /  Alb  3.3  /  TBili  0.3  /  DBili  x   /  AST  21  /  ALT  20  /  AlkPhos  48  12-15      Urinalysis Basic - ( 15 Dec 2023 06:38 )    Color: x / Appearance: x / SG: x / pH: x  Gluc: 123 mg/dL / Ketone: x  / Bili: x / Urobili: x   Blood: x / Protein: x / Nitrite: x   Leuk Esterase: x / RBC: x / WBC x   Sq Epi: x / Non Sq Epi: x / Bacteria: x          CAPILLARY BLOOD GLUCOSE    MICROBIOLOGY:     RADIOLOGY:  [ ] Reviewed and interpreted by me    Mode: AC/ CMV (Assist Control/ Continuous Mandatory Ventilation)  RR (machine): 12  TV (machine): 300  FiO2: 30  PEEP: 5  ITime: 1  MAP: 8  PIP: 29

## 2023-12-15 NOTE — PROGRESS NOTE ADULT - PROBLEM SELECTOR PLAN 7
- s/p Home Levemir 14 units in morning held on admission as noted w/ hypoglycemia   - Enteral feed changed to [employer] diabetic formula; glucose numbers stabilizing  - adjustments made throughout admission per endocrine recs - s/p Home Levemir 14 units in morning held on admission as noted w/ hypoglycemia   - Enteral feed changed to [employer] diabetic formula; glucose numbers stabilizing  - adjustments made throughout admission per endocrine recs

## 2023-12-15 NOTE — PROGRESS NOTE ADULT - ASSESSMENT
72 y/o F w/h/o T2DM with unknown control due to anemia of chronic disease skewing A1C levels. On Levemir and Humalog insulin PTA. Unknown DM complications. Also h/o HTN, HLD, anemia, hypothyroidism, RA, fibromyalgia, remote cerebral aneurysm repair, acute hypercapnic respiratory failure now with tracheostomy, PEG tube, and bedbound (trach change 8/18, PEG change 8/14), sacral pressure ulcer, BIBEMS from home for 6 day hx of bleeding from the tracheostomy and PEG tube with associated abdominal pain> now resolved. Endocrinology consulted for hyperglycemia. Tolerating TFs from 6am to 2am with BG levels in goal range on present insulin regimen.     #Type 2 diabetes mellitus with hyperglycemia, with long-term current use of insulin.   on Entereal nutrition - [employer] 60ml/hr from 6am to 2am (20hrs, continuous)  Recommendations:   - FS BG monitoring [id number] while on TFs/NPO   - Continue lantus 18 units sc qAM   - Continue Regular insulin 24 units sc at 6am, 11 units sc at 12 noon and 9 units sc at 6pm. PLEASE DO NOT HOLD IF PATIENT IS ON TUBE FEEDS (hold only if TF are stopped). Can decrease/adjust dose if there is a concern for hypoglycemia.   - C/w low dose admelog correction scale every 6 hours  - Will follow and adjust insulin as needed  DISCHARGE:  - Will be determined according to BG/insulin needs/TFs and steroid therapy at time of discharge. If discharge within next 24 hours will continue with same insulin types and doses as noted above with the exception of Admelog correction scale.   - Needs telemedicine as outpatient due to bedbound status. Can follow up at [facility]. [address] [place] [address] [address]. Phone . Make sure pt has Rx for all DM supplies and insulin.    #Hypothyroidism.   TSH and free thyroxine wnl 11/28/23  Recommendations:   - continue levothyroxine 125mcg daily  - Please make sure LT4 is given on an empty stomach at least 30 mins to 1 hour apart from other meds and food to ensure med absorption. DO NOT GIVE WITH VITAMINS/ANTACIDS  - Pt getting LT4 at 5am and PPI at 8am and 1 hour apart from TF.     #Secondary adrenal insufficiency.   - Due to chronic steroid use pt is at risk of tertiary AI, please do not abruptly discontinue or hold steroids as this can result in an adrenal crisis   - c/w prednisone 5mg daily  - hemodynamically stable at present time     #Hypertension  Recommendations:   - BP goal <130/80  - Manage per primary team.  - currently on amlodipine 10mg daily     #Hyperlipidemia.   Recommendations:   - LDL goal <70 due to DM  - Outpatient fasting lipid profile   - Consider moderate intensity Statin if not contraindicated and based on risks/benefits for pt  - Manage per primary team       Maggie Oh,    Attending Physician   Department of Endocrinology, Diabetes and Metabolism     If before 9AM or after 5PM, or on weekends/holidays, please call the Endocrine answering service for assistance ([phone]).  For nonurgent matters, please email [email or web address] or [email or web address]     Please note that this patient may be followed by different provider tomorrow.  Notify endocrine 24 hours prior to discharge for final recommendations   74 y/o F w/h/o T2DM with unknown control due to anemia of chronic disease skewing A1C levels. On Levemir and Humalog insulin PTA. Unknown DM complications. Also h/o HTN, HLD, anemia, hypothyroidism, RA, fibromyalgia, remote cerebral aneurysm repair, acute hypercapnic respiratory failure now with tracheostomy, PEG tube, and bedbound (trach change 8/18, PEG change 8/14), sacral pressure ulcer, BIBEMS from home for 6 day hx of bleeding from the tracheostomy and PEG tube with associated abdominal pain> now resolved. Endocrinology consulted for hyperglycemia. Tolerating TFs from 6am to 2am with BG levels in goal range on present insulin regimen.     #Type 2 diabetes mellitus with hyperglycemia, with long-term current use of insulin.   on Entereal nutrition - [employer] 60ml/hr from 6am to 2am (20hrs, continuous)  Recommendations:   - FS BG monitoring [id number] while on TFs/NPO   - Continue lantus 18 units sc qAM   - Continue Regular insulin 24 units sc at 6am, 11 units sc at 12 noon and 9 units sc at 6pm. PLEASE DO NOT HOLD IF PATIENT IS ON TUBE FEEDS (hold only if TF are stopped). Can decrease/adjust dose if there is a concern for hypoglycemia.   - C/w low dose admelog correction scale every 6 hours  - Will follow and adjust insulin as needed  DISCHARGE:  - Will be determined according to BG/insulin needs/TFs and steroid therapy at time of discharge. If discharge within next 24 hours will continue with same insulin types and doses as noted above with the exception of Admelog correction scale.   - Needs telemedicine as outpatient due to bedbound status. Can follow up at [facility]. [address] [place] [address] [address]. Phone . Make sure pt has Rx for all DM supplies and insulin.    #Hypothyroidism.   TSH and free thyroxine wnl 11/28/23  Recommendations:   - continue levothyroxine 125mcg daily  - Please make sure LT4 is given on an empty stomach at least 30 mins to 1 hour apart from other meds and food to ensure med absorption. DO NOT GIVE WITH VITAMINS/ANTACIDS  - Pt getting LT4 at 5am and PPI at 8am and 1 hour apart from TF.     #Secondary adrenal insufficiency.   - Due to chronic steroid use pt is at risk of tertiary AI, please do not abruptly discontinue or hold steroids as this can result in an adrenal crisis   - c/w prednisone 5mg daily  - hemodynamically stable at present time     #Hypertension  Recommendations:   - BP goal <130/80  - Manage per primary team.  - currently on amlodipine 10mg daily     #Hyperlipidemia.   Recommendations:   - LDL goal <70 due to DM  - Outpatient fasting lipid profile   - Consider moderate intensity Statin if not contraindicated and based on risks/benefits for pt  - Manage per primary team       Maggie Oh,    Attending Physician   Department of Endocrinology, Diabetes and Metabolism     If before 9AM or after 5PM, or on weekends/holidays, please call the Endocrine answering service for assistance ([phone]).  For nonurgent matters, please email [email or web address] or [email or web address]     Please note that this patient may be followed by different provider tomorrow.  Notify endocrine 24 hours prior to discharge for final recommendations   72 y/o F w/h/o T2DM with unknown control due to anemia of chronic disease skewing A1C levels. On Levemir and Humalog insulin PTA. Unknown DM complications. Also h/o HTN, HLD, anemia, hypothyroidism, RA, fibromyalgia, remote cerebral aneurysm repair, acute hypercapnic respiratory failure now with tracheostomy, PEG tube, and bedbound (trach change 8/18, PEG change 8/14), sacral pressure ulcer, BIBEMS from home for 6 day hx of bleeding from the tracheostomy and PEG tube with associated abdominal pain> now resolved. Endocrinology consulted for hyperglycemia. Tolerating TFs from 6am to 2am with BG levels in goal range on present insulin regimen.     #Type 2 diabetes mellitus with hyperglycemia, with long-term current use of insulin.   #on Enteral nutrition - [employer] 60ml/hr from 6am to 2am (20hrs, continuous)  Recommendations:   - FS BG monitoring [id number] while on TFs/NPO   - Continue lantus 18 units sc qAM   - Continue Regular insulin 24 units sc at 6am, 11 units sc at 12 noon and 9 units sc at 6pm. PLEASE DO NOT HOLD IF PATIENT IS ON TUBE FEEDS (hold only if TF are stopped). Can decrease/adjust dose if there is a concern for hypoglycemia.   - C/w low dose admelog correction scale every 6 hours  - Will follow and adjust insulin as needed  DISCHARGE:  - Will be determined according to BG/insulin needs/TFs and steroid therapy at time of discharge. If discharge within next 24 hours will continue with same insulin types and doses as noted above with the exception of Admelog correction scale.   - Needs telemedicine as outpatient due to bedbound status. Can follow up at [facility]. [address] [place] [address] [address]. Phone . Make sure pt has Rx for all DM supplies and insulin.    #Hypothyroidism.   TSH and free thyroxine wnl 11/28/23  Recommendations:   - continue levothyroxine 125mcg daily  - Please make sure LT4 is given on an empty stomach at least 30 mins to 1 hour apart from other meds and food to ensure med absorption. DO NOT GIVE WITH VITAMINS/ANTACIDS  - Pt getting LT4 at 5am and PPI at 8am and 1 hour apart from TF.     #Secondary adrenal insufficiency.   - Due to chronic steroid use pt is at risk of tertiary AI, please do not abruptly discontinue or hold steroids as this can result in an adrenal crisis   - c/w prednisone 5mg daily  - hemodynamically stable at present time     #Hypertension  Recommendations:   - BP goal <130/80  - Manage per primary team.  - currently on amlodipine 10mg daily     #Hyperlipidemia.   Recommendations:   - LDL goal <70 due to DM  - Outpatient fasting lipid profile   - Consider moderate intensity Statin if not contraindicated and based on risks/benefits for pt  - Manage per primary team       Maggie Oh,    Attending Physician   Department of Endocrinology, Diabetes and Metabolism     If before 9AM or after 5PM, or on weekends/holidays, please call the Endocrine answering service for assistance ([phone]).  For nonurgent matters, please email [email or web address] or [email or web address]     Please note that this patient may be followed by different provider tomorrow.  Notify endocrine 24 hours prior to discharge for final recommendations   72 y/o F w/h/o T2DM with unknown control due to anemia of chronic disease skewing A1C levels. On Levemir and Humalog insulin PTA. Unknown DM complications. Also h/o HTN, HLD, anemia, hypothyroidism, RA, fibromyalgia, remote cerebral aneurysm repair, acute hypercapnic respiratory failure now with tracheostomy, PEG tube, and bedbound (trach change 8/18, PEG change 8/14), sacral pressure ulcer, BIBEMS from home for 6 day hx of bleeding from the tracheostomy and PEG tube with associated abdominal pain> now resolved. Endocrinology consulted for hyperglycemia. Tolerating TFs from 6am to 2am with BG levels in goal range on present insulin regimen.     #Type 2 diabetes mellitus with hyperglycemia, with long-term current use of insulin.   #on Enteral nutrition - [employer] 60ml/hr from 6am to 2am (20hrs, continuous)  Recommendations:   - FS BG monitoring [id number] while on TFs/NPO   - Continue lantus 18 units sc qAM   - Continue Regular insulin 24 units sc at 6am, 11 units sc at 12 noon and 9 units sc at 6pm. PLEASE DO NOT HOLD IF PATIENT IS ON TUBE FEEDS (hold only if TF are stopped). Can decrease/adjust dose if there is a concern for hypoglycemia.   - C/w low dose admelog correction scale every 6 hours  - Will follow and adjust insulin as needed  DISCHARGE:  - Will be determined according to BG/insulin needs/TFs and steroid therapy at time of discharge. If discharge within next 24 hours will continue with same insulin types and doses as noted above with the exception of Admelog correction scale.   - Needs telemedicine as outpatient due to bedbound status. Can follow up at [facility]. [address] [place] [address] [address]. Phone . Make sure pt has Rx for all DM supplies and insulin.    #Hypothyroidism.   TSH and free thyroxine wnl 11/28/23  Recommendations:   - continue levothyroxine 125mcg daily  - Please make sure LT4 is given on an empty stomach at least 30 mins to 1 hour apart from other meds and food to ensure med absorption. DO NOT GIVE WITH VITAMINS/ANTACIDS  - Pt getting LT4 at 5am and PPI at 8am and 1 hour apart from TF.     #Secondary adrenal insufficiency.   - Due to chronic steroid use pt is at risk of tertiary AI, please do not abruptly discontinue or hold steroids as this can result in an adrenal crisis   - c/w prednisone 5mg daily  - hemodynamically stable at present time     #Hypertension  Recommendations:   - BP goal <130/80  - Manage per primary team.  - currently on amlodipine 10mg daily     #Hyperlipidemia.   Recommendations:   - LDL goal <70 due to DM  - Outpatient fasting lipid profile   - Consider moderate intensity Statin if not contraindicated and based on risks/benefits for pt  - Manage per primary team       Maggie Oh,    Attending Physician   Department of Endocrinology, Diabetes and Metabolism     If before 9AM or after 5PM, or on weekends/holidays, please call the Endocrine answering service for assistance ([phone]).  For nonurgent matters, please email [email or web address] or [email or web address]     Please note that this patient may be followed by different provider tomorrow.  Notify endocrine 24 hours prior to discharge for final recommendations

## 2023-12-16 DIAGNOSIS — Z91.89 OTHER SPECIFIED PERSONAL RISK FACTORS, NOT ELSEWHERE CLASSIFIED: ICD-10-CM

## 2023-12-16 LAB
GLUCOSE BLDC GLUCOMTR-MCNC: 110 MG/DL — HIGH (ref 70–99)
GLUCOSE BLDC GLUCOMTR-MCNC: 110 MG/DL — HIGH (ref 70–99)
GLUCOSE BLDC GLUCOMTR-MCNC: 130 MG/DL — HIGH (ref 70–99)
GLUCOSE BLDC GLUCOMTR-MCNC: 130 MG/DL — HIGH (ref 70–99)
GLUCOSE BLDC GLUCOMTR-MCNC: 139 MG/DL — HIGH (ref 70–99)
GLUCOSE BLDC GLUCOMTR-MCNC: 139 MG/DL — HIGH (ref 70–99)
GLUCOSE BLDC GLUCOMTR-MCNC: 155 MG/DL — HIGH (ref 70–99)
GLUCOSE BLDC GLUCOMTR-MCNC: 155 MG/DL — HIGH (ref 70–99)
GLUCOSE BLDC GLUCOMTR-MCNC: 159 MG/DL — HIGH (ref 70–99)
GLUCOSE BLDC GLUCOMTR-MCNC: 159 MG/DL — HIGH (ref 70–99)

## 2023-12-16 PROCEDURE — 99232 SBSQ HOSP IP/OBS MODERATE 35: CPT

## 2023-12-16 RX ORDER — LIDOCAINE 4 G/100G
5 CREAM TOPICAL
Refills: 0 | Status: DISCONTINUED | OUTPATIENT
Start: 2023-12-16 | End: 2024-01-17

## 2023-12-16 RX ORDER — INSULIN HUMAN 100 [IU]/ML
4 INJECTION, SOLUTION SUBCUTANEOUS ONCE
Refills: 0 | Status: COMPLETED | OUTPATIENT
Start: 2023-12-16 | End: 2023-12-16

## 2023-12-16 RX ORDER — AER TRAVELER 0.5 G/1
1 SOLUTION RECTAL; TOPICAL
Refills: 0 | Status: DISCONTINUED | OUTPATIENT
Start: 2023-12-16 | End: 2023-12-21

## 2023-12-16 RX ADMIN — PANTOPRAZOLE SODIUM 40 MILLIGRAM(S): 20 TABLET, DELAYED RELEASE ORAL at 09:46

## 2023-12-16 RX ADMIN — NYSTATIN CREAM 1 APPLICATION(S): 100000 CREAM TOPICAL at 06:56

## 2023-12-16 RX ADMIN — Medication 1: at 12:22

## 2023-12-16 RX ADMIN — Medication 1 TABLET(S): at 06:54

## 2023-12-16 RX ADMIN — Medication 1 MILLIGRAM(S): at 21:58

## 2023-12-16 RX ADMIN — Medication 3 MILLILITER(S): at 06:00

## 2023-12-16 RX ADMIN — AMLODIPINE BESYLATE 10 MILLIGRAM(S): 2.5 TABLET ORAL at 06:55

## 2023-12-16 RX ADMIN — Medication 500 MILLIGRAM(S): at 12:16

## 2023-12-16 RX ADMIN — Medication 5 MILLIGRAM(S): at 21:57

## 2023-12-16 RX ADMIN — SIMETHICONE 80 MILLIGRAM(S): 80 TABLET, CHEWABLE ORAL at 17:43

## 2023-12-16 RX ADMIN — Medication 1 TABLET(S): at 17:43

## 2023-12-16 RX ADMIN — SIMETHICONE 80 MILLIGRAM(S): 80 TABLET, CHEWABLE ORAL at 12:16

## 2023-12-16 RX ADMIN — CHLORHEXIDINE GLUCONATE 1 APPLICATION(S): 213 SOLUTION TOPICAL at 06:54

## 2023-12-16 RX ADMIN — LIDOCAINE 1 PATCH: 4 CREAM TOPICAL at 12:16

## 2023-12-16 RX ADMIN — INSULIN GLARGINE 18 UNIT(S): 100 INJECTION, SOLUTION SUBCUTANEOUS at 09:30

## 2023-12-16 RX ADMIN — SIMETHICONE 80 MILLIGRAM(S): 80 TABLET, CHEWABLE ORAL at 06:54

## 2023-12-16 RX ADMIN — Medication 3 MILLILITER(S): at 11:03

## 2023-12-16 RX ADMIN — INSULIN HUMAN 24 UNIT(S): 100 INJECTION, SOLUTION SUBCUTANEOUS at 06:54

## 2023-12-16 RX ADMIN — CHLORHEXIDINE GLUCONATE 15 MILLILITER(S): 213 SOLUTION TOPICAL at 06:53

## 2023-12-16 RX ADMIN — INSULIN HUMAN 4 UNIT(S): 100 INJECTION, SOLUTION SUBCUTANEOUS at 17:39

## 2023-12-16 RX ADMIN — Medication 3 MILLILITER(S): at 17:31

## 2023-12-16 RX ADMIN — ESCITALOPRAM OXALATE 10 MILLIGRAM(S): 10 TABLET, FILM COATED ORAL at 09:30

## 2023-12-16 RX ADMIN — NYSTATIN CREAM 1 APPLICATION(S): 100000 CREAM TOPICAL at 22:00

## 2023-12-16 RX ADMIN — Medication 125 MICROGRAM(S): at 06:55

## 2023-12-16 RX ADMIN — INSULIN HUMAN 11 UNIT(S): 100 INJECTION, SOLUTION SUBCUTANEOUS at 12:14

## 2023-12-16 RX ADMIN — Medication 200 MILLIGRAM(S): at 21:57

## 2023-12-16 RX ADMIN — Medication 2 DROP(S): at 17:38

## 2023-12-16 RX ADMIN — SIMETHICONE 80 MILLIGRAM(S): 80 TABLET, CHEWABLE ORAL at 00:19

## 2023-12-16 RX ADMIN — Medication 1 APPLICATION(S): at 12:17

## 2023-12-16 RX ADMIN — Medication 3 MILLILITER(S): at 23:59

## 2023-12-16 RX ADMIN — Medication 2 DROP(S): at 06:54

## 2023-12-16 RX ADMIN — Medication 15 MILLILITER(S): at 12:16

## 2023-12-16 RX ADMIN — GABAPENTIN 100 MILLIGRAM(S): 400 CAPSULE ORAL at 21:57

## 2023-12-16 RX ADMIN — QUETIAPINE FUMARATE 12.5 MILLIGRAM(S): 200 TABLET, FILM COATED ORAL at 17:48

## 2023-12-16 RX ADMIN — Medication 5 MILLIGRAM(S): at 06:55

## 2023-12-16 RX ADMIN — LIDOCAINE 1 PATCH: 4 CREAM TOPICAL at 19:37

## 2023-12-16 RX ADMIN — Medication 2 DROP(S): at 12:17

## 2023-12-16 RX ADMIN — Medication 200 MILLIGRAM(S): at 15:32

## 2023-12-16 RX ADMIN — LIDOCAINE 5 MILLILITER(S): 4 CREAM TOPICAL at 17:41

## 2023-12-16 RX ADMIN — PHENYLEPHRINE-SHARK LIVER OIL-MINERAL OIL-PETROLATUM RECTAL OINTMENT 1 APPLICATION(S): at 12:20

## 2023-12-16 RX ADMIN — Medication 1 APPLICATION(S): at 17:42

## 2023-12-16 RX ADMIN — CHLORHEXIDINE GLUCONATE 15 MILLILITER(S): 213 SOLUTION TOPICAL at 17:48

## 2023-12-16 RX ADMIN — NYSTATIN CREAM 1 APPLICATION(S): 100000 CREAM TOPICAL at 13:58

## 2023-12-16 RX ADMIN — ZINC OXIDE 1 APPLICATION(S): 200 OINTMENT TOPICAL at 15:47

## 2023-12-16 RX ADMIN — Medication 2 DROP(S): at 00:20

## 2023-12-16 RX ADMIN — Medication 1 APPLICATION(S): at 06:55

## 2023-12-16 RX ADMIN — Medication 1 APPLICATION(S): at 00:20

## 2023-12-16 NOTE — PROGRESS NOTE ADULT - PROBLEM SELECTOR PLAN 6
- Continue home Prednisone regimen 5 mg daily   *fibromyalgia  - continue home Gabapentin 100mg daily  - continue home Fentanyl 25mcg Q72H patches  - c/w Lidocaine patch   - Oxycodone 2.5mg q6 hrs PRN (in addition to Tylenol PRN) - Home amlodipine held on admission, restarted -- continue monitoring BP   - Echo from 10/17/2020 notable for hyperdynamic L ventricular systolic function, moderately severe LVOT obstruction, and EF 81% (per Wilkinson calculation) vs 77% (per Teichholz calculation)

## 2023-12-16 NOTE — PROGRESS NOTE ADULT - ASSESSMENT
74 y/o F with PMHx of T2DM, HTN, HLD, anemia, hypothyroidism, RA, fibromyalgia, remote cerebral aneurysm repair, acute hypercapnic respiratory failure now with tracheostomy, PEG tube, and bedbound (trach change 8/18, PEG change 8/14), sacral pressure ulcer, BIBEMS from home for 6 day hx of bleeding from the tracheostomy and PEG tube with associated abdominal pain, recent history of auditory and visual hallucinations, and with chronic sacral decubitus ulcer. Exam notable for mild abdominal distention with LLQ and RLQ tenderness, tracheostomy in place with no obvious bleeding, PEG tube with scant amount of dried blood, at baseline neurologic status. Imaging showing no active bleeding around tracheostomy site, however was notable for mild thickening along PEG tube tract, sacral ulcer extending to the coccyx suggestive of possible osteomyelitis, and bibasilar patchy lung opacities with volume loss. Patient admitted for respiratory support, wound care of tracheostomy and PEG, and management of sacral osteomyelitis. No further Trach and peg site bleeding noted since admission.  Pelvic MRI imaging also suggestive of Acute OM. Patient placed on long-term antibiotics with Infectious disease guidance.  Additionally treated with a 7d course of Cefepime due to PSA and Serratia tracheitis on 11/8-->11/15 and then resumed cipro/keflex.  Hospital course c/b Delirium for which Seroquel was added and home Lexapro continued. Tracheostomy changed to #9 Cuffed [place] by ENT 11/3.  Despite trach change patient remained with persistent air leak.  On 11/19, the trach was again changed due to leak and loss of volumes on vent.  Trach was changed to #8 distal cuffed [place].  Patient seen by Dermatology for back lesions.  One diagnosed as a seborrheic keratitis and the other a dermatofibroma.  Intermittent abdominal pain throughout hospital course, at times relieved with gas/BM, w/u negative, seen by GI - no recs.  12/10 pt completed cipro and keflex for osteo treatment.  12/13 moderate amounts of secretions w/ blood - tranexamic acid neb given.      12/14:  S/p TXA nebulizer yesterday.  Bloody secretions were much improved today.  Held off on in-line PMV for today due to recent administration of TXA - will re-eval tomorrow.  Pt otherwise stable.  Pts daughter Marysol updated/involved in plan of care.   12/15:   Fever yesterday evening, 100.8F.  RVP and UA negative.  Blood cultures obtained.  Remains off antibiotics.  Patient complained of bottom tooth pain, gums appear inflamed but largely unchanged from prior exams except for notable loose teeth.  Dental reconsulted to evaluate for teeth extraction as patient and daughter amenable due to aspiration. 72 y/o F with PMHx of T2DM, HTN, HLD, anemia, hypothyroidism, RA, fibromyalgia, remote cerebral aneurysm repair, acute hypercapnic respiratory failure now with tracheostomy, PEG tube, and bedbound (trach change 8/18, PEG change 8/14), sacral pressure ulcer, BIBEMS from home for 6 day hx of bleeding from the tracheostomy and PEG tube with associated abdominal pain, recent history of auditory and visual hallucinations, and with chronic sacral decubitus ulcer. Exam notable for mild abdominal distention with LLQ and RLQ tenderness, tracheostomy in place with no obvious bleeding, PEG tube with scant amount of dried blood, at baseline neurologic status. Imaging showing no active bleeding around tracheostomy site, however was notable for mild thickening along PEG tube tract, sacral ulcer extending to the coccyx suggestive of possible osteomyelitis, and bibasilar patchy lung opacities with volume loss. Patient admitted for respiratory support, wound care of tracheostomy and PEG, and management of sacral osteomyelitis. No further Trach and peg site bleeding noted since admission.  Pelvic MRI imaging also suggestive of Acute OM. Patient placed on long-term antibiotics with Infectious disease guidance.  Additionally treated with a 7d course of Cefepime due to PSA and Serratia tracheitis on 11/8-->11/15 and then resumed cipro/keflex.  Hospital course c/b Delirium for which Seroquel was added and home Lexapro continued. Tracheostomy changed to #9 Cuffed [place] by ENT 11/3.  Despite trach change patient remained with persistent air leak.  On 11/19, the trach was again changed due to leak and loss of volumes on vent.  Trach was changed to #8 distal cuffed [place].  Patient seen by Dermatology for back lesions.  One diagnosed as a seborrheic keratitis and the other a dermatofibroma.  Intermittent abdominal pain throughout hospital course, at times relieved with gas/BM, w/u negative, seen by GI - no recs.  12/10 pt completed cipro and keflex for osteo treatment.  12/13 moderate amounts of secretions w/ blood - tranexamic acid neb given.      12/14:  S/p TXA nebulizer yesterday.  Bloody secretions were much improved today.  Held off on in-line PMV for today due to recent administration of TXA - will re-eval tomorrow.  Pt otherwise stable.  Pts daughter Marysol updated/involved in plan of care.   12/15:   Fever yesterday evening, 100.8F.  RVP and UA negative.  Blood cultures obtained.  Remains off antibiotics.  Patient complained of bottom tooth pain, gums appear inflamed but largely unchanged from prior exams except for notable loose teeth.  Dental reconsulted to evaluate for teeth extraction as patient and daughter amenable due to aspiration. 72 y/o F with PMHx of T2DM, HTN, HLD, anemia, hypothyroidism, RA, fibromyalgia, remote cerebral aneurysm repair, acute hypercapnic respiratory failure now with tracheostomy, PEG tube, and bedbound (trach change 8/18, PEG change 8/14), sacral pressure ulcer, BIBEMS from home for 6 day hx of bleeding from the tracheostomy and PEG tube with associated abdominal pain, recent history of auditory and visual hallucinations, and with chronic sacral decubitus ulcer. Exam notable for mild abdominal distention with LLQ and RLQ tenderness, tracheostomy in place with no obvious bleeding, PEG tube with scant amount of dried blood, at baseline neurologic status. Imaging showing no active bleeding around tracheostomy site, however was notable for mild thickening along PEG tube tract, sacral ulcer extending to the coccyx suggestive of possible osteomyelitis, and bibasilar patchy lung opacities with volume loss. Patient admitted for respiratory support, wound care of tracheostomy and PEG, and management of sacral osteomyelitis. No further Trach and peg site bleeding noted since admission.  Pelvic MRI imaging also suggestive of Acute OM. Patient placed on long-term antibiotics with Infectious disease guidance.  Additionally treated with a 7d course of Cefepime due to PSA and Serratia tracheitis on 11/8-->11/15 and then resumed cipro/keflex.  Hospital course c/b Delirium for which Seroquel was added and home Lexapro continued. Tracheostomy changed to #9 Cuffed [place] by ENT 11/3.  Despite trach change patient remained with persistent air leak.  On 11/19, the trach was again changed due to leak and loss of volumes on vent.  Trach was changed to #8 distal cuffed [place].  Patient seen by Dermatology for back lesions.  One diagnosed as a seborrheic keratitis and the other a dermatofibroma.  Intermittent abdominal pain throughout hospital course, at times relieved with gas/BM, w/u negative, seen by GI - no recs.  12/10 pt completed cipro and keflex for osteo treatment.  12/13 moderate amounts of secretions w/ blood - tranexamic acid neb given.      12/14:  S/p TXA nebulizer yesterday.  Bloody secretions were much improved today.  Held off on in-line PMV for today due to recent administration of TXA - will re-eval tomorrow.  Pt otherwise stable.  Pts daughter Marysol updated/involved in plan of care.   12/15:   Fever yesterday evening, 100.8F.  RVP and UA negative.  Blood cultures obtained.  Remains off antibiotics.  Patient complained of bottom tooth pain, gums appear inflamed but largely unchanged from prior exams except for notable loose teeth.  Dental reconsulted to evaluate for teeth extraction as patient and daughter amenable due to aspiration.  12/16-no new events

## 2023-12-16 NOTE — PROGRESS NOTE ADULT - SUBJECTIVE AND OBJECTIVE BOX
Patient is a 73y old  Female who presents with a chief complaint of bleeding (04 Dec 2023 16:57)      Interval Events:    REVIEW OF SYSTEMS:  [ ] Positive for   [ ] All other systems negative  [ ] Unable to assess ROS because ________    Vital Signs Last 24 Hrs  T(C): 36.9 (23 @ 06:24), Max: 36.9 (23 @ 06:24)  T(F): 98.4 (23 @ 06:24), Max: 98.4 (23 @ 06:24)  HR: 84 (23 @ 06:24) (72 - 95)  BP: 110/63 (23 @ 06:24) (110/63 - 113/62)  RR: 20 (23 @ 06:24) (20 - 20)  SpO2: 100% (23 @ 06:24) (99% - 100%)                            HOSPITAL MEDICATIONS:  MEDICATIONS  (STANDING):  albuterol/ipratropium for Nebulization 3 milliLiter(s) Nebulizer every 6 hours  amLODIPine   Tablet 10 milliGRAM(s) Oral daily  artificial  tears Solution 2 Drop(s) Both EYES four times a day  ascorbic acid 500 milliGRAM(s) Oral daily  calcium carbonate    500 mG (Tums) Chewable 1 Tablet(s) Chew every 12 hours  chlorhexidine 0.12% Liquid 15 milliLiter(s) Oral Mucosa every 12 hours  chlorhexidine 4% Liquid 1 Application(s) Topical <User Schedule>  dextrose 50% Injectable 12.5 Gram(s) IV Push once  dextrose 50% Injectable 25 Gram(s) IV Push once  dextrose 50% Injectable 50 milliLiter(s) IV Push once  escitalopram 10 milliGRAM(s) Oral <User Schedule>  fentaNYL   Patch  25 MICROgram(s)/Hr 1 Patch Transdermal every 72 hours  gabapentin Solution 100 milliGRAM(s) Enteral Tube at bedtime  glucagon  Injectable 1 milliGRAM(s) IntraMuscular once  hemorrhoidal Ointment      hemorrhoidal Ointment 1 Application(s) Rectal daily  insulin glargine Injectable (LANTUS) 18 Unit(s) SubCutaneous every morning  insulin lispro (ADMELOG) corrective regimen sliding scale   SubCutaneous every 6 hours  insulin regular  human recombinant 11 Unit(s) SubCutaneous <User Schedule>  insulin regular  human recombinant 24 Unit(s) SubCutaneous <User Schedule>  insulin regular  human recombinant 9 Unit(s) SubCutaneous <User Schedule>  levothyroxine 125 MICROGram(s) Oral <User Schedule>  lidocaine   4% Patch 1 Patch Transdermal daily  melatonin 5 milliGRAM(s) Oral at bedtime  melatonin 1 milliGRAM(s) Oral at bedtime  multivitamin/minerals/iron Oral Solution (CENTRUM) 15 milliLiter(s) Oral daily  nystatin Powder 1 Application(s) Topical every 8 hours  pantoprazole   Suspension 40 milliGRAM(s) Enteral Tube <User Schedule>  petrolatum Ophthalmic Ointment 1 Application(s) Both EYES four times a day  predniSONE   Tablet 5 milliGRAM(s) Oral daily  QUEtiapine 12.5 milliGRAM(s) Oral <User Schedule>  simethicone 80 milliGRAM(s) Chew four times a day  zinc oxide 40% Paste 1 Application(s) Topical daily    MEDICATIONS  (PRN):  acetaminophen   Oral Liquid .. 1000 milliGRAM(s) Enteral Tube every 6 hours PRN Temp greater or equal to 38C (100.4F), Mild Pain (1 - 3)  benzocaine 20% Spray 1 Spray(s) Topical four times a day PRN Cough  diclofenac sodium 1% Gel 2 Gram(s) Topical four times a day PRN pain  diphenhydrAMINE Elixir 25 milliGRAM(s) Oral every 6 hours PRN Rash and/or Itching  guaifenesin/dextromethorphan Oral Liquid 10 milliLiter(s) Oral every 6 hours PRN Cough  oxyCODONE    Solution 2.5 milliGRAM(s) Oral every 6 hours PRN Moderate Pain (4 - 6)  sodium chloride 0.65% Nasal 1 Spray(s) Both Nostrils every 6 hours PRN Nasal Congestion      LABS:                        9.2    8.28  )-----------( 107      ( 15 Dec 2023 06:38 )             30.9     12-15    141  |  102  |  32<H>  ----------------------------<  123<H>  3.7   |  29  |  <0.30<L>    Ca    9.7      15 Dec 2023 06:38  Phos  4.6     12-15  Mg     2.0     12-15    TPro  7.1  /  Alb  3.3  /  TBili  0.3  /  DBili  x   /  AST  21  /  ALT  20  /  AlkPhos  48  12-15      Urinalysis Basic - ( 15 Dec 2023 11:56 )    Color: Yellow / Appearance: Cloudy / S.022 / pH: x  Gluc: x / Ketone: Negative mg/dL  / Bili: Negative / Urobili: 0.2 mg/dL   Blood: x / Protein: Negative mg/dL / Nitrite: Negative   Leuk Esterase: Trace / RBC: 55 /HPF / WBC 0 /HPF   Sq Epi: x / Non Sq Epi: 1 /HPF / Bacteria: Negative /HPF          CAPILLARY BLOOD GLUCOSE    MICROBIOLOGY:     RADIOLOGY:  [ ] Reviewed and interpreted by me    Mode: AC/ CMV (Assist Control/ Continuous Mandatory Ventilation)  RR (machine): 12  TV (machine): 300  FiO2: 30  PEEP: 5  ITime: 1  MAP: 8  PIP: 25   Patient is a 73y old  Female who presents with a chief complaint of bleeding (04 Dec 2023 16:57)      Interval Events: none     REVIEW OF SYSTEMS:  [ ] Positive for   [x] All other systems negative  [ ] Unable to assess ROS because ________    Vital Signs Last 24 Hrs  T(C): 36.9 (23 @ 06:24), Max: 36.9 (23 @ 06:24)  T(F): 98.4 (23 @ 06:24), Max: 98.4 (23 @ 06:24)  HR: 84 (23 @ 06:24) (72 - 95)  BP: 110/63 (23 @ 06:24) (110/63 - 113/62)  RR: 20 (23 @ 06:24) (20 - 20)  SpO2: 100% (23 @ 06:24) (99% - 100%)      PHYSICAL EXAM:  HEENT:   [X]Tracheostomy: #8 distal XLT Shiley  [X]Pupils equal  [ ]No oral lesions  [X] Abnormal: missing dentition; +teeth caries; +loose bottom teeth    SKIN  [ ]No Rash  [ ] Abnormal  [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    CARDIAC  [X]Regular  [ ]Abnormal    PULMONARY  [ ]Bilateral Clear Breath Sounds  [ ]Normal Excursion  [X]Abnormal - BL Coarse BS    GI  [X]PEG      [X] +BS		              [X]Soft, nondistended, nontender	  [ ]Abnormal    MUSCULOSKELETAL                                   [X]Bedbound                 [ ]Abnormal    [ ]Ambulatory/OOB to chair                           EXTREMITIES                                         [ ]Normal  [ x]Edema left hand, right foot                           NEUROLOGIC  [ ] Normal, non focal  [X] Focal findings: opens eyes spontaneously, follows commands, moves all 4 extremities    PSYCHIATRIC  [X]Alert and appropriate  [ ] Sedated	 [ ]Agitated    :  Mcbride: [ ] Yes, if yes: Date of Placement:                   [X] No    LINES: Central Lines [ ] Yes, if yes: Date of Placement                                     [X] No                          HOSPITAL MEDICATIONS:  MEDICATIONS  (STANDING):  albuterol/ipratropium for Nebulization 3 milliLiter(s) Nebulizer every 6 hours  amLODIPine   Tablet 10 milliGRAM(s) Oral daily  artificial  tears Solution 2 Drop(s) Both EYES four times a day  ascorbic acid 500 milliGRAM(s) Oral daily  calcium carbonate    500 mG (Tums) Chewable 1 Tablet(s) Chew every 12 hours  chlorhexidine 0.12% Liquid 15 milliLiter(s) Oral Mucosa every 12 hours  chlorhexidine 4% Liquid 1 Application(s) Topical <User Schedule>  dextrose 50% Injectable 12.5 Gram(s) IV Push once  dextrose 50% Injectable 25 Gram(s) IV Push once  dextrose 50% Injectable 50 milliLiter(s) IV Push once  escitalopram 10 milliGRAM(s) Oral <User Schedule>  fentaNYL   Patch  25 MICROgram(s)/Hr 1 Patch Transdermal every 72 hours  gabapentin Solution 100 milliGRAM(s) Enteral Tube at bedtime  glucagon  Injectable 1 milliGRAM(s) IntraMuscular once  hemorrhoidal Ointment      hemorrhoidal Ointment 1 Application(s) Rectal daily  insulin glargine Injectable (LANTUS) 18 Unit(s) SubCutaneous every morning  insulin lispro (ADMELOG) corrective regimen sliding scale   SubCutaneous every 6 hours  insulin regular  human recombinant 11 Unit(s) SubCutaneous <User Schedule>  insulin regular  human recombinant 24 Unit(s) SubCutaneous <User Schedule>  insulin regular  human recombinant 9 Unit(s) SubCutaneous <User Schedule>  levothyroxine 125 MICROGram(s) Oral <User Schedule>  lidocaine   4% Patch 1 Patch Transdermal daily  melatonin 5 milliGRAM(s) Oral at bedtime  melatonin 1 milliGRAM(s) Oral at bedtime  multivitamin/minerals/iron Oral Solution (CENTRUM) 15 milliLiter(s) Oral daily  nystatin Powder 1 Application(s) Topical every 8 hours  pantoprazole   Suspension 40 milliGRAM(s) Enteral Tube <User Schedule>  petrolatum Ophthalmic Ointment 1 Application(s) Both EYES four times a day  predniSONE   Tablet 5 milliGRAM(s) Oral daily  QUEtiapine 12.5 milliGRAM(s) Oral <User Schedule>  simethicone 80 milliGRAM(s) Chew four times a day  zinc oxide 40% Paste 1 Application(s) Topical daily    MEDICATIONS  (PRN):  acetaminophen   Oral Liquid .. 1000 milliGRAM(s) Enteral Tube every 6 hours PRN Temp greater or equal to 38C (100.4F), Mild Pain (1 - 3)  benzocaine 20% Spray 1 Spray(s) Topical four times a day PRN Cough  diclofenac sodium 1% Gel 2 Gram(s) Topical four times a day PRN pain  diphenhydrAMINE Elixir 25 milliGRAM(s) Oral every 6 hours PRN Rash and/or Itching  guaifenesin/dextromethorphan Oral Liquid 10 milliLiter(s) Oral every 6 hours PRN Cough  oxyCODONE    Solution 2.5 milliGRAM(s) Oral every 6 hours PRN Moderate Pain (4 - 6)  sodium chloride 0.65% Nasal 1 Spray(s) Both Nostrils every 6 hours PRN Nasal Congestion      LABS:                        9.2    8.28  )-----------( 107      ( 15 Dec 2023 06:38 )             30.9     12-15    141  |  102  |  32<H>  ----------------------------<  123<H>  3.7   |  29  |  <0.30<L>    Ca    9.7      15 Dec 2023 06:38  Phos  4.6     12-15  Mg     2.0     -15    TPro  7.1  /  Alb  3.3  /  TBili  0.3  /  DBili  x   /  AST  21  /  ALT  20  /  AlkPhos  48  12-15      Urinalysis Basic - ( 15 Dec 2023 11:56 )    Color: Yellow / Appearance: Cloudy / S.022 / pH: x  Gluc: x / Ketone: Negative mg/dL  / Bili: Negative / Urobili: 0.2 mg/dL   Blood: x / Protein: Negative mg/dL / Nitrite: Negative   Leuk Esterase: Trace / RBC: 55 /HPF / WBC 0 /HPF   Sq Epi: x / Non Sq Epi: 1 /HPF / Bacteria: Negative /HPF      CAPILLARY BLOOD GLUCOSE    MICROBIOLOGY:     Culture - Blood (23 @ 19:09)    Specimen Source: .Blood Blood-Peripheral   Culture Results:   No growth at 24 hours      Culture - Sputum . (23 @ 07:38)    Gram Stain:   No squamous epithelial cells seen per low power field  No polymorphonuclear cells seen seen per low power field  Moderate Gram Negative Rods seen per oil power field  Few Gram Positive Rods seen per oil power field   -  Piperacillin/Tazobactam: S <=8   -  Meropenem: S <=1   -  Ciprofloxacin: R >2   -  Imipenem: S 2   -  Levofloxacin: R >4   -  Aztreonam: S <=4   -  Cefepime: S 4   -  Ceftazidime: S <=1   Specimen Source: .Sputum Sputum   Culture Results:   Numerous Pseudomonas aeruginosa  Normal Respiratory Shanda present   Organism Identification: Pseudomonas aeruginosa   Organism: Pseudomonas aeruginosa   Method Type: LENIN    Culture - Urine (23 @ 15:55)    -  Ampicillin: R >8 Predicts results to ampicillin/sulbactam, amoxacillin-clavulanate and  piperacillin-tazobactam.   -  Ciprofloxacin: R >2   -  Daptomycin: SDD 2 The breakpoint for SDD (susceptible dose dependent)is based on a dosage regimen of 8-12 mg/kg administered every 24 h in adults and is intended for serious infections due to E. faecium. Consultation with an infectious diseases specialist is recommended.   -  Levofloxacin: R >4   -  Linezolid: S 1   -  Nitrofurantoin: S <=32 Should not be used to treat pyelonephritis.   -  Tetracycline: S <=1   -  Vancomycin: R >16   Specimen Source: Clean Catch Clean Catch (Midstream)   Culture Results:   10,000 - 49,000 CFU/mL Enterococcus faecium (vancomycin resistant)   Organism Identification: Enterococcus faecium (vancomycin resistant)   Organism: Enterococcus faecium (vancomycin resistant)   Method Type: LENIN        RADIOLOGY:  [ x] Reviewed and interpreted by me    < from: Xray Chest 1 View- PORTABLE-Urgent (Xray Chest 1 View- PORTABLE-Urgent .) (23 @ 14:41) >  IMPRESSION:  Endotracheal tube terminating above the nick. Bilateral small pleural   effusions with associated subsegmental atelectasis.      Mode: AC/ CMV (Assist Control/ Continuous Mandatory Ventilation)  RR (machine): 12  TV (machine): 300  FiO2: 30  PEEP: 5  ITime: 1  MAP: 8  PIP: 25   Patient is a 73y old  Female who presents with a chief complaint of bleeding (04 Dec 2023 16:57)      Interval Events: none     REVIEW OF SYSTEMS:  [x ] Positive for daughter concerns of pt's  fractured & broken down teeth, wants inpatient extractions   [x] All other systems negative  [ ] Unable to assess ROS because ________    Vital Signs Last 24 Hrs  T(C): 36.9 (23 @ 06:24), Max: 36.9 (23 @ 06:24)  T(F): 98.4 (23 @ 06:24), Max: 98.4 (23 @ 06:24)  HR: 84 (23 @ 06:24) (72 - 95)  BP: 110/63 (23 @ 06:24) (110/63 - 113/62)  RR: 20 (23 @ 06:24) (20 - 20)  SpO2: 100% (23 @ 06:24) (99% - 100%)      PHYSICAL EXAM:  HEENT:   [X]Tracheostomy: #8 distal XLT Shiley  [X]Pupils equal  [ ]No oral lesions  [X] Abnormal: missing dentition; +teeth caries; +loose bottom teeth    SKIN  [ ]No Rash  [ ] Abnormal  [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    CARDIAC  [X]Regular  [ ]Abnormal    PULMONARY  [ ]Bilateral Clear Breath Sounds  [ ]Normal Excursion  [X]Abnormal - BL Coarse BS    GI  [X]PEG      [X] +BS		              [X]Soft, nondistended, nontender	  [ ]Abnormal    MUSCULOSKELETAL                                   [X]Bedbound                 [ ]Abnormal    [ ]Ambulatory/OOB to chair                           EXTREMITIES                                         [ ]Normal  [ x]Edema left hand, right foot                           NEUROLOGIC  [ ] Normal, non focal  [X] Focal findings: opens eyes spontaneously, follows commands, moves all 4 extremities    PSYCHIATRIC  [X]Alert and appropriate  [ ] Sedated	 [ ]Agitated    :  Reed: [ ] Yes, if yes: Date of Placement:                   [X] No    LINES: Central Lines [ ] Yes, if yes: Date of Placement                                     [X] No                          HOSPITAL MEDICATIONS:  MEDICATIONS  (STANDING):  albuterol/ipratropium for Nebulization 3 milliLiter(s) Nebulizer every 6 hours  amLODIPine   Tablet 10 milliGRAM(s) Oral daily  artificial  tears Solution 2 Drop(s) Both EYES four times a day  ascorbic acid 500 milliGRAM(s) Oral daily  calcium carbonate    500 mG (Tums) Chewable 1 Tablet(s) Chew every 12 hours  chlorhexidine 0.12% Liquid 15 milliLiter(s) Oral Mucosa every 12 hours  chlorhexidine 4% Liquid 1 Application(s) Topical <User Schedule>  dextrose 50% Injectable 12.5 Gram(s) IV Push once  dextrose 50% Injectable 25 Gram(s) IV Push once  dextrose 50% Injectable 50 milliLiter(s) IV Push once  escitalopram 10 milliGRAM(s) Oral <User Schedule>  fentaNYL   Patch  25 MICROgram(s)/Hr 1 Patch Transdermal every 72 hours  gabapentin Solution 100 milliGRAM(s) Enteral Tube at bedtime  glucagon  Injectable 1 milliGRAM(s) IntraMuscular once  hemorrhoidal Ointment      hemorrhoidal Ointment 1 Application(s) Rectal daily  insulin glargine Injectable (LANTUS) 18 Unit(s) SubCutaneous every morning  insulin lispro (ADMELOG) corrective regimen sliding scale   SubCutaneous every 6 hours  insulin regular  human recombinant 11 Unit(s) SubCutaneous <User Schedule>  insulin regular  human recombinant 24 Unit(s) SubCutaneous <User Schedule>  insulin regular  human recombinant 9 Unit(s) SubCutaneous <User Schedule>  levothyroxine 125 MICROGram(s) Oral <User Schedule>  lidocaine   4% Patch 1 Patch Transdermal daily  melatonin 5 milliGRAM(s) Oral at bedtime  melatonin 1 milliGRAM(s) Oral at bedtime  multivitamin/minerals/iron Oral Solution (CENTRUM) 15 milliLiter(s) Oral daily  nystatin Powder 1 Application(s) Topical every 8 hours  pantoprazole   Suspension 40 milliGRAM(s) Enteral Tube <User Schedule>  petrolatum Ophthalmic Ointment 1 Application(s) Both EYES four times a day  predniSONE   Tablet 5 milliGRAM(s) Oral daily  QUEtiapine 12.5 milliGRAM(s) Oral <User Schedule>  simethicone 80 milliGRAM(s) Chew four times a day  zinc oxide 40% Paste 1 Application(s) Topical daily    MEDICATIONS  (PRN):  acetaminophen   Oral Liquid .. 1000 milliGRAM(s) Enteral Tube every 6 hours PRN Temp greater or equal to 38C (100.4F), Mild Pain (1 - 3)  benzocaine 20% Spray 1 Spray(s) Topical four times a day PRN Cough  diclofenac sodium 1% Gel 2 Gram(s) Topical four times a day PRN pain  diphenhydrAMINE Elixir 25 milliGRAM(s) Oral every 6 hours PRN Rash and/or Itching  guaifenesin/dextromethorphan Oral Liquid 10 milliLiter(s) Oral every 6 hours PRN Cough  oxyCODONE    Solution 2.5 milliGRAM(s) Oral every 6 hours PRN Moderate Pain (4 - 6)  sodium chloride 0.65% Nasal 1 Spray(s) Both Nostrils every 6 hours PRN Nasal Congestion      LABS:                        9.2    8.28  )-----------( 107      ( 15 Dec 2023 06:38 )             30.9     12-15    141  |  102  |  32<H>  ----------------------------<  123<H>  3.7   |  29  |  <0.30<L>    Ca    9.7      15 Dec 2023 06:38  Phos  4.6     12-15  Mg     2.0     12-15    TPro  7.1  /  Alb  3.3  /  TBili  0.3  /  DBili  x   /  AST  21  /  ALT  20  /  AlkPhos  48  12-15      Urinalysis Basic - ( 15 Dec 2023 11:56 )    Color: Yellow / Appearance: Cloudy / S.022 / pH: x  Gluc: x / Ketone: Negative mg/dL  / Bili: Negative / Urobili: 0.2 mg/dL   Blood: x / Protein: Negative mg/dL / Nitrite: Negative   Leuk Esterase: Trace / RBC: 55 /HPF / WBC 0 /HPF   Sq Epi: x / Non Sq Epi: 1 /HPF / Bacteria: Negative /HPF      CAPILLARY BLOOD GLUCOSE    MICROBIOLOGY:     Culture - Blood (23 @ 19:09)    Specimen Source: .Blood Blood-Peripheral   Culture Results:   No growth at 24 hours      Culture - Sputum . (23 @ 07:38)    Gram Stain:   No squamous epithelial cells seen per low power field  No polymorphonuclear cells seen seen per low power field  Moderate Gram Negative Rods seen per oil power field  Few Gram Positive Rods seen per oil power field   -  Piperacillin/Tazobactam: S <=8   -  Meropenem: S <=1   -  Ciprofloxacin: R >2   -  Imipenem: S 2   -  Levofloxacin: R >4   -  Aztreonam: S <=4   -  Cefepime: S 4   -  Ceftazidime: S <=1   Specimen Source: .Sputum Sputum   Culture Results:   Numerous Pseudomonas aeruginosa  Normal Respiratory Shanda present   Organism Identification: Pseudomonas aeruginosa   Organism: Pseudomonas aeruginosa   Method Type: LENIN    Culture - Urine (23 @ 15:55)    -  Ampicillin: R >8 Predicts results to ampicillin/sulbactam, amoxacillin-clavulanate and  piperacillin-tazobactam.   -  Ciprofloxacin: R >2   -  Daptomycin: SDD 2 The breakpoint for SDD (susceptible dose dependent)is based on a dosage regimen of 8-12 mg/kg administered every 24 h in adults and is intended for serious infections due to E. faecium. Consultation with an infectious diseases specialist is recommended.   -  Levofloxacin: R >4   -  Linezolid: S 1   -  Nitrofurantoin: S <=32 Should not be used to treat pyelonephritis.   -  Tetracycline: S <=1   -  Vancomycin: R >16   Specimen Source: Clean Catch Clean Catch (Midstream)   Culture Results:   10,000 - 49,000 CFU/mL Enterococcus faecium (vancomycin resistant)   Organism Identification: Enterococcus faecium (vancomycin resistant)   Organism: Enterococcus faecium (vancomycin resistant)   Method Type: LENIN        RADIOLOGY:  [ x] Reviewed and interpreted by me    < from: Xray Chest 1 View- PORTABLE-Urgent (Xray Chest 1 View- PORTABLE-Urgent .) (23 @ 14:41) >  IMPRESSION:  Endotracheal tube terminating above the nick. Bilateral small pleural   effusions with associated subsegmental atelectasis.      Mode: AC/ CMV (Assist Control/ Continuous Mandatory Ventilation)  RR (machine): 12  TV (machine): 300  FiO2: 30  PEEP: 5  ITime: 1  MAP: 8  PIP: 25

## 2023-12-16 NOTE — PROGRESS NOTE ADULT - PROBLEM SELECTOR PLAN 5
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 Feed held x 2 hrs, site  CDI.  Robitussin Q6h for cough, resume feed.  GI FU called and left message

## 2023-12-16 NOTE — PROGRESS NOTE ADULT - NS ATTEND AMEND GEN_ALL_CORE FT
73F with a h/o DM, HTN, HLD, anemia, hypothyroidism, RA, fibromyalgia, remote cerebral aneurysm with repair, hypercapnic respiratory failure s/p tracheostomy/PEG, bedbound, sacral pressure ulcer. Admitted w/ bleeding from tracheostomy and PEG w/ associated abdominal pain, recent hx of auditory/visual hallucinations.   Imaging showed mild thickening along PEG tube tract and sacral ulcer extending to coccyx suggestive of acute osteomyelitis.      # Osteomyelitis  # Chronic hypoxemic RF  # oropharyngeal dysphagia  # acute on chronic pain  # Trach/Peg bleeding  # Tracheitis with Pseudomonas and serratia  # Hx of hallucination, anxiety     #Neuro - awake, alert, and interactive. moving all extremities, mouthing words  Continue current medications  - Behavioral Health Follow-up as needed   #Cardiovascular - hemodynamically stable  #Pulmonary - chronic hypoxemic respiratory failure, has 8XLT trach, on home vent, pressure support trials daily 15/5  - noted to have blood mixed with sputum upon suctioning. FiO2 requirements remain low - 30%FiO2 saturating well.  dc hypertonic saline in case contributing to irritation, minimize suctioning as tolerated.  labs appreciated no sig thrombocytopenia, 1 dose TXA neb. Improving today  #Gastro - oropharyngeal dysphagia with PEG tube in place, tolerating feeds, nutrition follow up appreciated   Abdominal sono 12/1 and CT abdomen 12/4- No acute intra-abdominal findings.   #Infectious Disease - sacral osteo on MRI - wound care follow-up appreciated, c/w offloading and local wound care  -The patient has had this wound since a hospitalization in December 2022 and per daughter does not have chronic or multiple wounds but only this single wound.   - sp 6 week course of Cipro and Keflex as per ID - completed 12/10/23  - Per daughter, patient has had prior multiple infections during hospitalizations including prior pseudomonas, MRSA, E faecalis, and Klebsiella  - Sputum colonized with MDR Pseudomonas   - Daughter reports a recent dental abscess and being told by outpatient dentist that patient needed teeth extracted - Dental evaluation noted with no plans for intervention as inpatient.   - febrile to 100.8 overnight, blood cultures sent   #Hem/Onc - prior cytopenias in setting of antibiotics per patient's daughter   - Hem/Onc follow-up noted - suspect an anemia of chronic disease  #Pain - continue current pain control - in setting of fibromyalgia and pain from wound  - Voltaren topical, lidocaine patches and low dose Oxycodone as needed  #Derm - previously seen by derm for skin lesions - mid back with irritated seborrheic keratosis - outpatient follow-up  #Endo - DM2 - continue insulin and adjust as needed for goal FS < 180  - Endocrine follow-up appreciated   - Continue Synthroid - TSH WNL  #DVT proph - SCDs  - Prior prophylactic AC stopped after concern for prior bleeding. as above:  73F with a h/o DM, HTN, HLD, anemia, hypothyroidism, RA, fibromyalgia, remote cerebral aneurysm with repair, hypercapnic respiratory failure s/p tracheostomy/PEG, bedbound, sacral pressure ulcer. Admitted w/ bleeding from tracheostomy and PEG w/ associated abdominal pain, recent hx of auditory/visual hallucinations.   Imaging showed mild thickening along PEG tube tract and sacral ulcer extending to coccyx suggestive of acute osteomyelitis.      # Osteomyelitis  # Chronic hypoxemic RF  # oropharyngeal dysphagia  # acute on chronic pain  # Trach/Peg bleeding  # Tracheitis with Pseudomonas and serratia  # Hx of hallucination, anxiety     #Neuro - awake, alert, and interactive. moving all extremities, mouthing words    Continue current medications  - Behavioral Health Follow-up as needed   #Cardiovascular - hemodynamically stable  #Pulmonary - chronic hypoxemic respiratory failure, has 8XLT trach, on home vent, pressure support trials daily 15/5  - noted to have blood mixed with sputum upon suctioning. FiO2 requirements remain low - 30%FiO2 saturating well.  dc hypertonic saline in case contributing to irritation, minimize suctioning as tolerated.  labs appreciated no sig thrombocytopenia, 1 dose TXA neb. Improving   #Gastro - oropharyngeal dysphagia with PEG tube in place, tolerating feeds, nutrition follow up appreciated   Abdominal sono 12/1 and CT abdomen 12/4- No acute intra-abdominal findings.   #Infectious Disease - sacral osteo on MRI - wound care follow-up appreciated, c/w offloading and local wound care  -The patient has had this wound since a hospitalization in December 2022 and per daughter does not have chronic or multiple wounds but only this single wound.   - sp 6 week course of Cipro and Keflex as per ID - completed 12/10/23  - Per daughter, patient has had prior multiple infections during hospitalizations including prior pseudomonas, MRSA, E faecalis, and Klebsiella  - Sputum colonized with MDR Pseudomonas   - Daughter reported a recent dental abscess and being told by outpatient dentist that patient needed teeth extracted - Dental evaluation noted with no plans for intervention as inpatient.   - febrile to 100.8 overnight, blood cultures sent   #Hem/Onc - prior cytopenias in setting of antibiotics per patient's daughter   - Hem/Onc follow-up noted - suspect an anemia of chronic disease  #Pain - continue current pain control - in setting of fibromyalgia and pain from wound  - Voltaren topical, lidocaine patches and low dose Oxycodone as needed  #Derm - previously seen by derm for skin lesions - mid back with irritated seborrheic keratosis - outpatient follow-up  #Endo - DM2 - continue insulin and adjust as needed for goal FS < 180  - Endocrine follow-up appreciated   - Continue Synthroid - TSH WNL  #DVT proph - SCDs  - Prior prophylactic AC stopped after concern for prior bleeding.  Cesar Hernandez MD-Pulmonary   [phone] as above:  73F with a h/o DM, HTN, HLD, anemia, hypothyroidism, RA, fibromyalgia, remote cerebral aneurysm with repair, hypercapnic respiratory failure s/p tracheostomy/PEG, bedbound, sacral pressure ulcer. Admitted w/ bleeding from tracheostomy and PEG w/ associated abdominal pain, recent hx of auditory/visual hallucinations.   Imaging showed mild thickening along PEG tube tract and sacral ulcer extending to coccyx suggestive of acute osteomyelitis.      # Osteomyelitis  # Chronic hypoxemic RF  # oropharyngeal dysphagia  # acute on chronic pain  # Trach/Peg bleeding  # Tracheitis with Pseudomonas and serratia  # Hx of hallucination, anxiety     #Neuro - awake, alert, and interactive. moving all extremities, mouthing words    Continue current medications  - Behavioral Health Follow-up as needed   #Cardiovascular - hemodynamically stable  #Pulmonary - chronic hypoxemic respiratory failure, has 8XLT trach, on home vent, pressure support trials daily 15/5  - noted to have blood mixed with sputum upon suctioning. FiO2 requirements remain low - 30%FiO2 saturating well.  dc hypertonic saline in case contributing to irritation, minimize suctioning as tolerated.  labs appreciated no sig thrombocytopenia, 1 dose TXA neb. Improving   #Gastro - oropharyngeal dysphagia with PEG tube in place, tolerating feeds, nutrition follow up appreciated   Abdominal sono 12/1 and CT abdomen 12/4- No acute intra-abdominal findings.   #Infectious Disease - sacral osteo on MRI - wound care follow-up appreciated, c/w offloading and local wound care  -The patient has had this wound since a hospitalization in December 2022 and per daughter does not have chronic or multiple wounds but only this single wound.   - sp 6 week course of Cipro and Keflex as per ID - completed 12/10/23  - Per daughter, patient has had prior multiple infections during hospitalizations including prior pseudomonas, MRSA, E faecalis, and Klebsiella  - Sputum colonized with MDR Pseudomonas   - Daughter reported a recent dental abscess and being told by outpatient dentist that patient needed teeth extracted - Dental evaluation noted with no plans for intervention as inpatient.   - febrile to 100.8 overnight, blood cultures sent   #Hem/Onc - prior cytopenias in setting of antibiotics per patient's daughter   - Hem/Onc follow-up noted - suspect an anemia of chronic disease  #Pain - continue current pain control - in setting of fibromyalgia and pain from wound  - Voltaren topical, lidocaine patches and low dose Oxycodone as needed  #Derm - previously seen by derm for skin lesions - mid back with irritated seborrheic keratosis - outpatient follow-up  #Endo - DM2 - continue insulin and adjust as needed for goal FS < 180  - Endocrine follow-up appreciated   - Continue Synthroid - TSH WNL  #DVT proph - SCDs  - Prior prophylactic AC stopped after concern for prior bleeding.  Cesar Hernandez MD-Pulmonary   [phone]

## 2023-12-16 NOTE — PROGRESS NOTE ADULT - PROBLEM SELECTOR PLAN 3
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  - Tolerates intermittent PSV 15/5 during day/Vent HS  - Airway Clearance: Duoneb, Hypersal, Chest PT, PRN suctioning   - Maintain 02 sat > 92%    Tracheal Bleeding (Intermittent)-->resolved since admission   - S/p CT Angio neck: No evidence of active bleeding around tracheostomy site. No hematoma.  No collection   - Seen by ENT; Kath and b/l bronchi visualized without any trauma, small amount of blood tinged secretions resting at beginning of right bronchus not actively bleeding.  - 12/13 tracheal bleeding upon suctioning - tranexamic neb given Seen by Dental Service on 11/27 and 12/15  12/15: IOE:  permanent dentition: multiple carious teeth, multiple missing teeth  Traumatic ulcer noted on alveolar ridge in edentulous site #4-5.  Erythematous and edematous gingiva noted in area of #10-13 and 22-23.   Generalized wear and broken down dentition.  Radiographs: Unable to take radiographs because patient was non-transportable per med team. Patient's daughter emailed radiographs from dental visit in October 2023. Radiographs show multiple worn and fractured dentition.   re: Dental F/U with outpatient dentist or [facility] clinic  12/16 pt wants inpatient extractions. will call back Dental service on Monday Seen by Dental Service on 11/27 and 12/15  12/15: IOE:  permanent dentition: multiple carious teeth, multiple missing teeth  Traumatic ulcer noted on alveolar ridge in edentulous site #4-5.  Erythematous and edematous gingiva noted in area of #10-13 and 22-23.   Generalized wear and broken down dentition.  Radiographs: Unable to take radiographs because patient was non-transportable per med team. Patient's daughter emailed radiographs from dental visit in October 2023. Radiographs show multiple worn and fractured dentition.   re: Dental F/U with outpatient dentist or [facility] clinic  12/16 pt wants inpatient extractions. will call back Dental service on Monday

## 2023-12-16 NOTE — PROGRESS NOTE ADULT - PROBLEM SELECTOR PLAN 8
Continue Home Lexapro 10mg daily   Psych consult appreciated   - Continue Seroquel 12.5mg daily (6PM) - s/p Home Levemir 14 units in morning held on admission as noted w/ hypoglycemia   - Enteral feed changed to [employer] diabetic formula; glucose numbers stabilizing  - adjustments made throughout admission per endocrine recs - s/p Home Levemir 14 units in morning held on admission as noted w/ hypoglycemia   - Enteral feed changed to [employer] diabetic formula; glucose numbers stabilizing  - adjustments made throughout admission per endocrine recs

## 2023-12-16 NOTE — PROGRESS NOTE ADULT - PROBLEM SELECTOR PLAN 4
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   - PEG Site appears macerated --> Multifactorial, possible the PEG has been too tight at home  - Peg loosened by GI at beside, no leakage or further intervention required   - recurrent RLQ abdominal pain and bleeding at the PEG site  - reevaluated by GI -- no bleeding at the PEG site  - 12/1: s/p abdominal ultrasound - limited study   - (12/3): Pt is c/o abd pain, and daughter is concerned   - AM amylase and Lipase all wnl , CT A/P 12/3 - no acute findings  - Continue Simethicone Chronic Trach/ Vent dependant 2/2 Hypercapnic Resp Failure since 10/2022   - S/p Trach # 8 Cuffed Flex Shiley; trach change 8/18, PEG change 8/14 per records   - Continue Home vent settings: (300 TV/ RR 12/5 Peep/ Fio2 30%)  - 11/3 trach changed to #9 Portex by ENT  - 11/18 RR increased to 14 due to hypercarbia from trach collar trial  - 11/17: Due to persistent air leak and volume loss on vent, trach again changed by ENT to #8 distal cuffed Shiley, 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  - Tolerates intermittent PSV 15/5 during day/Vent HS  - Airway Clearance: Duoneb, Hypersal, Chest PT, PRN suctioning   - Maintain 02 sat > 92%    Tracheal Bleeding (Intermittent)-->resolved since admission   - S/p CT Angio neck: No evidence of active bleeding around tracheostomy site. No hematoma.  No collection   - Seen by ENT; Kath and b/l bronchi visualized without any trauma, small amount of blood tinged secretions resting at beginning of right bronchus not actively bleeding.  - 12/13 tracheal bleeding upon suctioning - tranexamic neb given

## 2023-12-16 NOTE — PROGRESS NOTE ADULT - PROBLEM SELECTOR PLAN 7
- s/p Home Levemir 14 units in morning held on admission as noted w/ hypoglycemia   - Enteral feed changed to [employer] diabetic formula; glucose numbers stabilizing  - adjustments made throughout admission per endocrine recs - s/p Home Levemir 14 units in morning held on admission as noted w/ hypoglycemia   - Enteral feed changed to [employer] diabetic formula; glucose numbers stabilizing  - adjustments made throughout admission per endocrine recs - Continue home Prednisone regimen 5 mg daily   *fibromyalgia  - continue home Gabapentin 100mg daily  - continue home Fentanyl 25mcg Q72H patches  - c/w Lidocaine patch   - Oxycodone 2.5mg q6 hrs PRN (in addition to Tylenol PRN)

## 2023-12-16 NOTE — PROGRESS NOTE ADULT - PROBLEM SELECTOR PLAN 9
DVT PPx: Lovenox discontinued given daughter's concern for trach and PEG stoma bleeding/oozing.  Also with mild thrombocytopenia.  Will continue SCDs.     GOC: Full code  __ long discussion of RCU team with daughter, pt remains FULL CODE, daughter wishes MOLST form reevaluation once pt is less delirious Continue Home Lexapro 10mg daily   Psych consult appreciated   - Continue Seroquel 12.5mg daily (6PM)

## 2023-12-17 LAB
GLUCOSE BLDC GLUCOMTR-MCNC: 111 MG/DL — HIGH (ref 70–99)
GLUCOSE BLDC GLUCOMTR-MCNC: 111 MG/DL — HIGH (ref 70–99)
GLUCOSE BLDC GLUCOMTR-MCNC: 121 MG/DL — HIGH (ref 70–99)
GLUCOSE BLDC GLUCOMTR-MCNC: 121 MG/DL — HIGH (ref 70–99)
GLUCOSE BLDC GLUCOMTR-MCNC: 134 MG/DL — HIGH (ref 70–99)
GLUCOSE BLDC GLUCOMTR-MCNC: 134 MG/DL — HIGH (ref 70–99)
GLUCOSE BLDC GLUCOMTR-MCNC: 137 MG/DL — HIGH (ref 70–99)
GLUCOSE BLDC GLUCOMTR-MCNC: 137 MG/DL — HIGH (ref 70–99)
GLUCOSE BLDC GLUCOMTR-MCNC: 162 MG/DL — HIGH (ref 70–99)
GLUCOSE BLDC GLUCOMTR-MCNC: 162 MG/DL — HIGH (ref 70–99)

## 2023-12-17 PROCEDURE — 99232 SBSQ HOSP IP/OBS MODERATE 35: CPT

## 2023-12-17 RX ADMIN — Medication 1 APPLICATION(S): at 13:59

## 2023-12-17 RX ADMIN — AER TRAVELER 1 APPLICATION(S): 0.5 SOLUTION RECTAL; TOPICAL at 18:52

## 2023-12-17 RX ADMIN — ESCITALOPRAM OXALATE 10 MILLIGRAM(S): 10 TABLET, FILM COATED ORAL at 10:54

## 2023-12-17 RX ADMIN — Medication 1 TABLET(S): at 06:15

## 2023-12-17 RX ADMIN — CHLORHEXIDINE GLUCONATE 15 MILLILITER(S): 213 SOLUTION TOPICAL at 06:14

## 2023-12-17 RX ADMIN — LIDOCAINE 1 PATCH: 4 CREAM TOPICAL at 14:00

## 2023-12-17 RX ADMIN — Medication 125 MICROGRAM(S): at 05:04

## 2023-12-17 RX ADMIN — Medication 200 MILLIGRAM(S): at 06:14

## 2023-12-17 RX ADMIN — AER TRAVELER 1 APPLICATION(S): 0.5 SOLUTION RECTAL; TOPICAL at 06:14

## 2023-12-17 RX ADMIN — Medication 3 MILLILITER(S): at 06:35

## 2023-12-17 RX ADMIN — SIMETHICONE 80 MILLIGRAM(S): 80 TABLET, CHEWABLE ORAL at 06:14

## 2023-12-17 RX ADMIN — Medication 200 MILLIGRAM(S): at 18:51

## 2023-12-17 RX ADMIN — NYSTATIN CREAM 1 APPLICATION(S): 100000 CREAM TOPICAL at 06:14

## 2023-12-17 RX ADMIN — PHENYLEPHRINE-SHARK LIVER OIL-MINERAL OIL-PETROLATUM RECTAL OINTMENT 1 APPLICATION(S): at 13:58

## 2023-12-17 RX ADMIN — AER TRAVELER 1 APPLICATION(S): 0.5 SOLUTION RECTAL; TOPICAL at 15:08

## 2023-12-17 RX ADMIN — LIDOCAINE 1 PATCH: 4 CREAM TOPICAL at 00:37

## 2023-12-17 RX ADMIN — Medication 2 DROP(S): at 01:09

## 2023-12-17 RX ADMIN — Medication 3 MILLILITER(S): at 11:29

## 2023-12-17 RX ADMIN — Medication 2 DROP(S): at 14:02

## 2023-12-17 RX ADMIN — INSULIN GLARGINE 18 UNIT(S): 100 INJECTION, SOLUTION SUBCUTANEOUS at 10:54

## 2023-12-17 RX ADMIN — Medication 2 DROP(S): at 18:54

## 2023-12-17 RX ADMIN — Medication 200 MILLIGRAM(S): at 01:09

## 2023-12-17 RX ADMIN — Medication 1 TABLET(S): at 18:52

## 2023-12-17 RX ADMIN — Medication 3 MILLILITER(S): at 18:41

## 2023-12-17 RX ADMIN — SIMETHICONE 80 MILLIGRAM(S): 80 TABLET, CHEWABLE ORAL at 18:55

## 2023-12-17 RX ADMIN — Medication 1 APPLICATION(S): at 19:18

## 2023-12-17 RX ADMIN — INSULIN HUMAN 24 UNIT(S): 100 INJECTION, SOLUTION SUBCUTANEOUS at 06:16

## 2023-12-17 RX ADMIN — Medication 1 APPLICATION(S): at 01:09

## 2023-12-17 RX ADMIN — AER TRAVELER 1 APPLICATION(S): 0.5 SOLUTION RECTAL; TOPICAL at 00:41

## 2023-12-17 RX ADMIN — NYSTATIN CREAM 1 APPLICATION(S): 100000 CREAM TOPICAL at 15:07

## 2023-12-17 RX ADMIN — NYSTATIN CREAM 1 APPLICATION(S): 100000 CREAM TOPICAL at 22:16

## 2023-12-17 RX ADMIN — Medication 5 MILLIGRAM(S): at 06:15

## 2023-12-17 RX ADMIN — Medication 500 MILLIGRAM(S): at 13:59

## 2023-12-17 RX ADMIN — SIMETHICONE 80 MILLIGRAM(S): 80 TABLET, CHEWABLE ORAL at 01:09

## 2023-12-17 RX ADMIN — AMLODIPINE BESYLATE 10 MILLIGRAM(S): 2.5 TABLET ORAL at 06:15

## 2023-12-17 RX ADMIN — LIDOCAINE 5 MILLILITER(S): 4 CREAM TOPICAL at 19:39

## 2023-12-17 RX ADMIN — Medication 5 MILLIGRAM(S): at 22:16

## 2023-12-17 RX ADMIN — INSULIN HUMAN 9 UNIT(S): 100 INJECTION, SOLUTION SUBCUTANEOUS at 18:44

## 2023-12-17 RX ADMIN — SIMETHICONE 80 MILLIGRAM(S): 80 TABLET, CHEWABLE ORAL at 15:09

## 2023-12-17 RX ADMIN — Medication 3 MILLILITER(S): at 23:14

## 2023-12-17 RX ADMIN — Medication 1 APPLICATION(S): at 06:15

## 2023-12-17 RX ADMIN — LIDOCAINE 5 MILLILITER(S): 4 CREAM TOPICAL at 06:57

## 2023-12-17 RX ADMIN — PANTOPRAZOLE SODIUM 40 MILLIGRAM(S): 20 TABLET, DELAYED RELEASE ORAL at 10:54

## 2023-12-17 RX ADMIN — Medication 200 MILLIGRAM(S): at 13:58

## 2023-12-17 RX ADMIN — Medication 1: at 06:16

## 2023-12-17 RX ADMIN — ZINC OXIDE 1 APPLICATION(S): 200 OINTMENT TOPICAL at 14:03

## 2023-12-17 RX ADMIN — CHLORHEXIDINE GLUCONATE 15 MILLILITER(S): 213 SOLUTION TOPICAL at 18:52

## 2023-12-17 RX ADMIN — GABAPENTIN 100 MILLIGRAM(S): 400 CAPSULE ORAL at 22:15

## 2023-12-17 RX ADMIN — LIDOCAINE 1 PATCH: 4 CREAM TOPICAL at 19:31

## 2023-12-17 RX ADMIN — QUETIAPINE FUMARATE 12.5 MILLIGRAM(S): 200 TABLET, FILM COATED ORAL at 18:52

## 2023-12-17 RX ADMIN — Medication 1 MILLIGRAM(S): at 22:16

## 2023-12-17 RX ADMIN — INSULIN HUMAN 11 UNIT(S): 100 INJECTION, SOLUTION SUBCUTANEOUS at 14:04

## 2023-12-17 RX ADMIN — Medication 2 DROP(S): at 06:15

## 2023-12-17 RX ADMIN — Medication 15 MILLILITER(S): at 13:58

## 2023-12-17 RX ADMIN — CHLORHEXIDINE GLUCONATE 1 APPLICATION(S): 213 SOLUTION TOPICAL at 06:15

## 2023-12-17 NOTE — PROGRESS NOTE ADULT - PROBLEM SELECTOR PLAN 5
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   - PEG Site appears macerated --> Multifactorial, possible the PEG has been too tight at home  - Peg loosened by GI at beside, no leakage or further intervention required   - recurrent RLQ abdominal pain and bleeding at the PEG site  - reevaluated by GI -- no bleeding at the PEG site  - 12/1: s/p abdominal ultrasound - limited study   - (12/3): Pt is c/o abd pain, and daughter is concerned   - AM amylase and Lipase all wnl , CT A/P 12/3 - no acute findings  - Continue Simethicone  12/16  feed & mucous around PEG site after cough. Feed held x 2 hrs, site  CDI.  Robitussin Q6h for cough, resume feed.  GI FU called and left message

## 2023-12-17 NOTE — PROGRESS NOTE ADULT - PROBLEM SELECTOR PLAN 8
- s/p Home Levemir 14 units in morning held on admission as noted w/ hypoglycemia   - Enteral feed changed to [employer] diabetic formula; glucose numbers stabilizing  - adjustments made throughout admission per endocrine recs - s/p Home Levemir 14 units in morning held on admission as noted w/ hypoglycemia   - Enteral feed changed to [employer] diabetic formula; glucose numbers stabilizing  - adjustments made throughout admission per endocrine recs

## 2023-12-17 NOTE — PROGRESS NOTE ADULT - NS ATTEND AMEND GEN_ALL_CORE FT
as above:  73F with a h/o DM, HTN, HLD, anemia, hypothyroidism, RA, fibromyalgia, remote cerebral aneurysm with repair, hypercapnic respiratory failure s/p tracheostomy/PEG, bedbound, sacral pressure ulcer. Admitted w/ bleeding from tracheostomy and PEG w/ associated abdominal pain, recent hx of auditory/visual hallucinations.   Imaging showed mild thickening along PEG tube tract and sacral ulcer extending to coccyx suggestive of acute osteomyelitis.      # Osteomyelitis  # Chronic hypoxemic RF  # oropharyngeal dysphagia  # acute on chronic pain  # Trach/Peg bleeding  # Tracheitis with Pseudomonas and serratia  # Hx of hallucination, anxiety     #Neuro - awake, alert, and interactive. moving all extremities, mouthing words    Continue current medications  - Behavioral Health Follow-up as needed   #Cardiovascular - hemodynamically stable  #Pulmonary - chronic hypoxemic respiratory failure, has 8XLT trach, on home vent, pressure support trials daily 15/5  - noted to have blood mixed with sputum upon suctioning. FiO2 requirements remain low - 30%FiO2 saturating well.  dc hypertonic saline in case contributing to irritation, minimize suctioning as tolerated.  labs appreciated no sig thrombocytopenia, 1 dose TXA neb. Improving   #Gastro - oropharyngeal dysphagia with PEG tube in place, tolerating feeds, nutrition follow up appreciated   Abdominal sono 12/1 and CT abdomen 12/4- No acute intra-abdominal findings.   #Infectious Disease - sacral osteo on MRI - wound care follow-up appreciated, c/w offloading and local wound care  -The patient has had this wound since a hospitalization in December 2022 and per daughter does not have chronic or multiple wounds but only this single wound.   - sp 6 week course of Cipro and Keflex as per ID - completed 12/10/23  - Per daughter, patient has had prior multiple infections during hospitalizations including prior pseudomonas, MRSA, E faecalis, and Klebsiella  - Sputum colonized with MDR Pseudomonas   - Daughter reported a recent dental abscess and being told by outpatient dentist that patient needed teeth extracted - Dental evaluation noted with no plans for intervention as inpatient.   - febrile to 100.8 overnight, blood cultures sent   #Hem/Onc - prior cytopenias in setting of antibiotics per patient's daughter   - Hem/Onc follow-up noted - suspect an anemia of chronic disease  #Pain - continue current pain control - in setting of fibromyalgia and pain from wound  - Voltaren topical, lidocaine patches and low dose Oxycodone as needed  #Derm - previously seen by derm for skin lesions - mid back with irritated seborrheic keratosis - outpatient follow-up  #Endo - DM2 - continue insulin and adjust as needed for goal FS < 180  - Endocrine follow-up appreciated   - Continue Synthroid - TSH WNL  #DVT proph - SCDs  - Prior prophylactic AC stopped after concern for prior bleeding.  Cesar Hernandez MD-Pulmonary   [phone] as above:  73F with a h/o DM, HTN, HLD, anemia, hypothyroidism, RA, fibromyalgia, remote cerebral aneurysm with repair, hypercapnic respiratory failure s/p tracheostomy/PEG, bedbound, sacral pressure ulcer. Admitted w/ bleeding from tracheostomy and PEG w/ associated abdominal pain, recent hx of auditory/visual hallucinations.   Imaging showed mild thickening along PEG tube tract and sacral ulcer extending to coccyx suggestive of acute osteomyelitis.      # Osteomyelitis  # Chronic hypoxemic RF  # oropharyngeal dysphagia  # acute on chronic pain  # Trach/Peg bleeding  # Tracheitis with Pseudomonas and serratia  # Hx of hallucination, anxiety     #Neuro - awake, alert, and interactive. moving all extremities, mouthing words    Continue current medications  - Behavioral Health Follow-up as needed   #Cardiovascular - hemodynamically stable  #Pulmonary - chronic hypoxemic respiratory failure, has 8XLT trach, on home vent, pressure support trials daily 15/5  - noted to have blood mixed with sputum upon suctioning. FiO2 requirements remain low - 30%FiO2 saturating well.  dc hypertonic saline in case contributing to irritation, minimize suctioning as tolerated.  labs appreciated no sig thrombocytopenia, 1 dose TXA neb. Improving   #Gastro - oropharyngeal dysphagia with PEG tube in place, tolerating feeds, nutrition follow up appreciated   Abdominal sono 12/1 and CT abdomen 12/4- No acute intra-abdominal findings.   #Infectious Disease - sacral osteo on MRI - wound care follow-up appreciated, c/w offloading and local wound care  -The patient has had this wound since a hospitalization in December 2022 and per daughter does not have chronic or multiple wounds but only this single wound.   - sp 6 week course of Cipro and Keflex as per ID - completed 12/10/23  - Per daughter, patient has had prior multiple infections during hospitalizations including prior pseudomonas, MRSA, E faecalis, and Klebsiella  - Sputum colonized with MDR Pseudomonas   - Daughter reported a recent dental abscess and being told by outpatient dentist that patient needed teeth extracted - Dental evaluation noted with no plans for intervention as inpatient.   - febrile to 100.8 overnight, blood cultures sent   #Hem/Onc - prior cytopenias in setting of antibiotics per patient's daughter   - Hem/Onc follow-up noted - suspect an anemia of chronic disease  #Pain - continue current pain control - in setting of fibromyalgia and pain from wound  - Voltaren topical, lidocaine patches and low dose Oxycodone as needed  #Derm - previously seen by derm for skin lesions - mid back with irritated seborrheic keratosis - outpatient follow-up  #Endo - DM2 - continue insulin and adjust as needed for goal FS < 180  - Endocrine follow-up appreciated   - Continue Synthroid - TSH WNL  #DVT proph - SCDs  - Prior prophylactic AC stopped after concern for prior bleeding.  Cesar Hernandez MD-Pulmonary   [phone] as above: no new issues  73F with a h/o DM, HTN, HLD, anemia, hypothyroidism, RA, fibromyalgia, remote cerebral aneurysm with repair, hypercapnic respiratory failure s/p tracheostomy/PEG, bedbound, sacral pressure ulcer. Admitted w/ bleeding from tracheostomy and PEG w/ associated abdominal pain, recent hx of auditory/visual hallucinations.   Imaging showed mild thickening along PEG tube tract and sacral ulcer extending to coccyx suggestive of acute osteomyelitis.      # Osteomyelitis  # Chronic hypoxemic RF  # oropharyngeal dysphagia  # acute on chronic pain  # Trach/Peg bleeding  # Tracheitis with Pseudomonas and serratia  # Hx of hallucination, anxiety     #Neuro - awake, alert, and interactive. moving all extremities, mouthing words    Continue current medications  - Behavioral Health Follow-up as needed   #Cardiovascular - hemodynamically stable  #Pulmonary - chronic hypoxemic respiratory failure, has 8XLT trach, on home vent, pressure support trials daily 15/5  - noted to have blood mixed with sputum upon suctioning. FiO2 requirements remain low - 30%FiO2 saturating well.  dc hypertonic saline in case contributing to irritation, minimize suctioning as tolerated.  labs appreciated no sig thrombocytopenia, 1 dose TXA neb. Improving   #Gastro - oropharyngeal dysphagia with PEG tube in place, tolerating feeds, nutrition follow up appreciated   Abdominal sono 12/1 and CT abdomen 12/4- No acute intra-abdominal findings.   #Infectious Disease - sacral osteo on MRI - wound care follow-up appreciated, c/w offloading and local wound care  -The patient has had this wound since a hospitalization in December 2022 and per daughter does not have chronic or multiple wounds but only this single wound.   - sp 6 week course of Cipro and Keflex as per ID - completed 12/10/23  - Per daughter, patient has had prior multiple infections during hospitalizations including prior pseudomonas, MRSA, E faecalis, and Klebsiella  - Sputum colonized with MDR Pseudomonas   - Daughter reported a recent dental abscess and being told by outpatient dentist that patient needed teeth extracted - Dental evaluation noted with no plans for intervention as inpatient.   - febrile to 100.8 overnight, blood cultures sent   #Hem/Onc - prior cytopenias in setting of antibiotics per patient's daughter   - Hem/Onc follow-up noted - suspect an anemia of chronic disease  #Pain - continue current pain control - in setting of fibromyalgia and pain from wound  - Voltaren topical, lidocaine patches and low dose Oxycodone as needed  #Derm - previously seen by derm for skin lesions - mid back with irritated seborrheic keratosis - outpatient follow-up  #Endo - DM2 - continue insulin and adjust as needed for goal FS < 180  - Endocrine follow-up appreciated   - Continue Synthroid - TSH WNL  #DVT proph - SCDs  - Prior prophylactic AC stopped after concern for prior bleeding.  Cesar Hernandez MD-Pulmonary   [phone] as above: no new issues  73F with a h/o DM, HTN, HLD, anemia, hypothyroidism, RA, fibromyalgia, remote cerebral aneurysm with repair, hypercapnic respiratory failure s/p tracheostomy/PEG, bedbound, sacral pressure ulcer. Admitted w/ bleeding from tracheostomy and PEG w/ associated abdominal pain, recent hx of auditory/visual hallucinations.   Imaging showed mild thickening along PEG tube tract and sacral ulcer extending to coccyx suggestive of acute osteomyelitis.      # Osteomyelitis  # Chronic hypoxemic RF  # oropharyngeal dysphagia  # acute on chronic pain  # Trach/Peg bleeding  # Tracheitis with Pseudomonas and serratia  # Hx of hallucination, anxiety     #Neuro - awake, alert, and interactive. moving all extremities, mouthing words    Continue current medications  - Behavioral Health Follow-up as needed   #Cardiovascular - hemodynamically stable  #Pulmonary - chronic hypoxemic respiratory failure, has 8XLT trach, on home vent, pressure support trials daily 15/5  - noted to have blood mixed with sputum upon suctioning. FiO2 requirements remain low - 30%FiO2 saturating well.  dc hypertonic saline in case contributing to irritation, minimize suctioning as tolerated.  labs appreciated no sig thrombocytopenia, 1 dose TXA neb. Improving   #Gastro - oropharyngeal dysphagia with PEG tube in place, tolerating feeds, nutrition follow up appreciated   Abdominal sono 12/1 and CT abdomen 12/4- No acute intra-abdominal findings.   #Infectious Disease - sacral osteo on MRI - wound care follow-up appreciated, c/w offloading and local wound care  -The patient has had this wound since a hospitalization in December 2022 and per daughter does not have chronic or multiple wounds but only this single wound.   - sp 6 week course of Cipro and Keflex as per ID - completed 12/10/23  - Per daughter, patient has had prior multiple infections during hospitalizations including prior pseudomonas, MRSA, E faecalis, and Klebsiella  - Sputum colonized with MDR Pseudomonas   - Daughter reported a recent dental abscess and being told by outpatient dentist that patient needed teeth extracted - Dental evaluation noted with no plans for intervention as inpatient.   - febrile to 100.8 overnight, blood cultures sent   #Hem/Onc - prior cytopenias in setting of antibiotics per patient's daughter   - Hem/Onc follow-up noted - suspect an anemia of chronic disease  #Pain - continue current pain control - in setting of fibromyalgia and pain from wound  - Voltaren topical, lidocaine patches and low dose Oxycodone as needed  #Derm - previously seen by derm for skin lesions - mid back with irritated seborrheic keratosis - outpatient follow-up  #Endo - DM2 - continue insulin and adjust as needed for goal FS < 180  - Endocrine follow-up appreciated   - Continue Synthroid - TSH WNL  #DVT proph - SCDs  - Prior prophylactic AC stopped after concern for prior bleeding.  Cesar Hernandez MD-Pulmonary   [phone]

## 2023-12-17 NOTE — PROGRESS NOTE ADULT - SUBJECTIVE AND OBJECTIVE BOX
Patient is a 73y old  Female who presents with a chief complaint of bleeding (04 Dec 2023 16:57)      Interval Events: none     REVIEW OF SYSTEMS:  [x ] Positive for daughter concerns of pt's  fractured & broken down teeth, wants inpatient extractions   [x] All other systems negative  [ ] Unable to assess ROS because ________    Vital Signs Last 24 Hrs  T(C): 36.9 (23 @ 06:24), Max: 36.9 (23 @ 06:24)  T(F): 98.4 (23 @ 06:24), Max: 98.4 (23 @ 06:24)  HR: 84 (23 @ 06:24) (72 - 95)  BP: 110/63 (23 @ 06:24) (110/63 - 113/62)  RR: 20 (23 @ 06:24) (20 - 20)  SpO2: 100% (23 @ 06:24) (99% - 100%)      PHYSICAL EXAM:  HEENT:   [X]Tracheostomy: #8 distal XLT Shiley  [X]Pupils equal  [ ]No oral lesions  [X] Abnormal: missing dentition; +teeth caries; +loose bottom teeth    SKIN  [ ]No Rash  [ ] Abnormal  [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    CARDIAC  [X]Regular  [ ]Abnormal    PULMONARY  [ ]Bilateral Clear Breath Sounds  [ ]Normal Excursion  [X]Abnormal - BL Coarse BS    GI  [X]PEG      [X] +BS		              [X]Soft, nondistended, nontender	  [ ]Abnormal    MUSCULOSKELETAL                                   [X]Bedbound                 [ ]Abnormal    [ ]Ambulatory/OOB to chair                           EXTREMITIES                                         [ ]Normal  [ x]Edema left hand, right foot                           NEUROLOGIC  [ ] Normal, non focal  [X] Focal findings: opens eyes spontaneously, follows commands, moves all 4 extremities    PSYCHIATRIC  [X]Alert and appropriate  [ ] Sedated	 [ ]Agitated    :  Reed: [ ] Yes, if yes: Date of Placement:                   [X] No    LINES: Central Lines [ ] Yes, if yes: Date of Placement                                     [X] No                          HOSPITAL MEDICATIONS:  MEDICATIONS  (STANDING):  albuterol/ipratropium for Nebulization 3 milliLiter(s) Nebulizer every 6 hours  amLODIPine   Tablet 10 milliGRAM(s) Oral daily  artificial  tears Solution 2 Drop(s) Both EYES four times a day  ascorbic acid 500 milliGRAM(s) Oral daily  calcium carbonate    500 mG (Tums) Chewable 1 Tablet(s) Chew every 12 hours  chlorhexidine 0.12% Liquid 15 milliLiter(s) Oral Mucosa every 12 hours  chlorhexidine 4% Liquid 1 Application(s) Topical <User Schedule>  dextrose 50% Injectable 12.5 Gram(s) IV Push once  dextrose 50% Injectable 25 Gram(s) IV Push once  dextrose 50% Injectable 50 milliLiter(s) IV Push once  escitalopram 10 milliGRAM(s) Oral <User Schedule>  fentaNYL   Patch  25 MICROgram(s)/Hr 1 Patch Transdermal every 72 hours  gabapentin Solution 100 milliGRAM(s) Enteral Tube at bedtime  glucagon  Injectable 1 milliGRAM(s) IntraMuscular once  hemorrhoidal Ointment      hemorrhoidal Ointment 1 Application(s) Rectal daily  insulin glargine Injectable (LANTUS) 18 Unit(s) SubCutaneous every morning  insulin lispro (ADMELOG) corrective regimen sliding scale   SubCutaneous every 6 hours  insulin regular  human recombinant 11 Unit(s) SubCutaneous <User Schedule>  insulin regular  human recombinant 24 Unit(s) SubCutaneous <User Schedule>  insulin regular  human recombinant 9 Unit(s) SubCutaneous <User Schedule>  levothyroxine 125 MICROGram(s) Oral <User Schedule>  lidocaine   4% Patch 1 Patch Transdermal daily  melatonin 5 milliGRAM(s) Oral at bedtime  melatonin 1 milliGRAM(s) Oral at bedtime  multivitamin/minerals/iron Oral Solution (CENTRUM) 15 milliLiter(s) Oral daily  nystatin Powder 1 Application(s) Topical every 8 hours  pantoprazole   Suspension 40 milliGRAM(s) Enteral Tube <User Schedule>  petrolatum Ophthalmic Ointment 1 Application(s) Both EYES four times a day  predniSONE   Tablet 5 milliGRAM(s) Oral daily  QUEtiapine 12.5 milliGRAM(s) Oral <User Schedule>  simethicone 80 milliGRAM(s) Chew four times a day  zinc oxide 40% Paste 1 Application(s) Topical daily    MEDICATIONS  (PRN):  acetaminophen   Oral Liquid .. 1000 milliGRAM(s) Enteral Tube every 6 hours PRN Temp greater or equal to 38C (100.4F), Mild Pain (1 - 3)  benzocaine 20% Spray 1 Spray(s) Topical four times a day PRN Cough  diclofenac sodium 1% Gel 2 Gram(s) Topical four times a day PRN pain  diphenhydrAMINE Elixir 25 milliGRAM(s) Oral every 6 hours PRN Rash and/or Itching  guaifenesin/dextromethorphan Oral Liquid 10 milliLiter(s) Oral every 6 hours PRN Cough  oxyCODONE    Solution 2.5 milliGRAM(s) Oral every 6 hours PRN Moderate Pain (4 - 6)  sodium chloride 0.65% Nasal 1 Spray(s) Both Nostrils every 6 hours PRN Nasal Congestion      LABS:                        9.2    8.28  )-----------( 107      ( 15 Dec 2023 06:38 )             30.9     12-15    141  |  102  |  32<H>  ----------------------------<  123<H>  3.7   |  29  |  <0.30<L>    Ca    9.7      15 Dec 2023 06:38  Phos  4.6     12-15  Mg     2.0     12-15    TPro  7.1  /  Alb  3.3  /  TBili  0.3  /  DBili  x   /  AST  21  /  ALT  20  /  AlkPhos  48  12-15      Urinalysis Basic - ( 15 Dec 2023 11:56 )    Color: Yellow / Appearance: Cloudy / S.022 / pH: x  Gluc: x / Ketone: Negative mg/dL  / Bili: Negative / Urobili: 0.2 mg/dL   Blood: x / Protein: Negative mg/dL / Nitrite: Negative   Leuk Esterase: Trace / RBC: 55 /HPF / WBC 0 /HPF   Sq Epi: x / Non Sq Epi: 1 /HPF / Bacteria: Negative /HPF      CAPILLARY BLOOD GLUCOSE    MICROBIOLOGY:     Culture - Blood (23 @ 19:09)    Specimen Source: .Blood Blood-Peripheral   Culture Results:   No growth at 24 hours      Culture - Sputum . (23 @ 07:38)    Gram Stain:   No squamous epithelial cells seen per low power field  No polymorphonuclear cells seen seen per low power field  Moderate Gram Negative Rods seen per oil power field  Few Gram Positive Rods seen per oil power field   -  Piperacillin/Tazobactam: S <=8   -  Meropenem: S <=1   -  Ciprofloxacin: R >2   -  Imipenem: S 2   -  Levofloxacin: R >4   -  Aztreonam: S <=4   -  Cefepime: S 4   -  Ceftazidime: S <=1   Specimen Source: .Sputum Sputum   Culture Results:   Numerous Pseudomonas aeruginosa  Normal Respiratory Shanda present   Organism Identification: Pseudomonas aeruginosa   Organism: Pseudomonas aeruginosa   Method Type: LENIN    Culture - Urine (23 @ 15:55)    -  Ampicillin: R >8 Predicts results to ampicillin/sulbactam, amoxacillin-clavulanate and  piperacillin-tazobactam.   -  Ciprofloxacin: R >2   -  Daptomycin: SDD 2 The breakpoint for SDD (susceptible dose dependent)is based on a dosage regimen of 8-12 mg/kg administered every 24 h in adults and is intended for serious infections due to E. faecium. Consultation with an infectious diseases specialist is recommended.   -  Levofloxacin: R >4   -  Linezolid: S 1   -  Nitrofurantoin: S <=32 Should not be used to treat pyelonephritis.   -  Tetracycline: S <=1   -  Vancomycin: R >16   Specimen Source: Clean Catch Clean Catch (Midstream)   Culture Results:   10,000 - 49,000 CFU/mL Enterococcus faecium (vancomycin resistant)   Organism Identification: Enterococcus faecium (vancomycin resistant)   Organism: Enterococcus faecium (vancomycin resistant)   Method Type: LENIN        RADIOLOGY:  [ x] Reviewed and interpreted by me    < from: Xray Chest 1 View- PORTABLE-Urgent (Xray Chest 1 View- PORTABLE-Urgent .) (23 @ 14:41) >  IMPRESSION:  Endotracheal tube terminating above the nick. Bilateral small pleural   effusions with associated subsegmental atelectasis.      Mode: AC/ CMV (Assist Control/ Continuous Mandatory Ventilation)  RR (machine): 12  TV (machine): 300  FiO2: 30  PEEP: 5  ITime: 1  MAP: 8  PIP: 25

## 2023-12-17 NOTE — PROGRESS NOTE ADULT - PROBLEM SELECTOR PLAN 3
Seen by Dental Service on 11/27 and 12/15  12/15: IOE:  permanent dentition: multiple carious teeth, multiple missing teeth  Traumatic ulcer noted on alveolar ridge in edentulous site #4-5.  Erythematous and edematous gingiva noted in area of #10-13 and 22-23.   Generalized wear and broken down dentition.  Radiographs: Unable to take radiographs because patient was non-transportable per med team. Patient's daughter emailed radiographs from dental visit in October 2023. Radiographs show multiple worn and fractured dentition.   re: Dental F/U with outpatient dentist or [facility] clinic  12/16 pt wants inpatient extractions. will call back Dental service on Monday Seen by Dental Service on 11/27 and 12/15  12/15: IOE:  permanent dentition: multiple carious teeth, multiple missing teeth  Traumatic ulcer noted on alveolar ridge in edentulous site #4-5.  Erythematous and edematous gingiva noted in area of #10-13 and 22-23.   Generalized wear and broken down dentition.  Radiographs: Unable to take radiographs because patient was non-transportable per med team. Patient's daughter emailed radiographs from dental visit in October 2023. Radiographs show multiple worn and fractured dentition.   re: Dental F/U with outpatient dentist or [facility] clinic  12/16 pt wants inpatient extractions. will call back Dental service on Monday

## 2023-12-17 NOTE — PROGRESS NOTE ADULT - ASSESSMENT
72 y/o F with PMHx of T2DM, HTN, HLD, anemia, hypothyroidism, RA, fibromyalgia, remote cerebral aneurysm repair, acute hypercapnic respiratory failure now with tracheostomy, PEG tube, and bedbound (trach change 8/18, PEG change 8/14), sacral pressure ulcer, BIBEMS from home for 6 day hx of bleeding from the tracheostomy and PEG tube with associated abdominal pain, recent history of auditory and visual hallucinations, and with chronic sacral decubitus ulcer. Exam notable for mild abdominal distention with LLQ and RLQ tenderness, tracheostomy in place with no obvious bleeding, PEG tube with scant amount of dried blood, at baseline neurologic status. Imaging showing no active bleeding around tracheostomy site, however was notable for mild thickening along PEG tube tract, sacral ulcer extending to the coccyx suggestive of possible osteomyelitis, and bibasilar patchy lung opacities with volume loss. Patient admitted for respiratory support, wound care of tracheostomy and PEG, and management of sacral osteomyelitis. No further Trach and peg site bleeding noted since admission.  Pelvic MRI imaging also suggestive of Acute OM. Patient placed on long-term antibiotics with Infectious disease guidance.  Additionally treated with a 7d course of Cefepime due to PSA and Serratia tracheitis on 11/8-->11/15 and then resumed cipro/keflex.  Hospital course c/b Delirium for which Seroquel was added and home Lexapro continued. Tracheostomy changed to #9 Cuffed [place] by ENT 11/3.  Despite trach change patient remained with persistent air leak.  On 11/19, the trach was again changed due to leak and loss of volumes on vent.  Trach was changed to #8 distal cuffed [place].  Patient seen by Dermatology for back lesions.  One diagnosed as a seborrheic keratitis and the other a dermatofibroma.  Intermittent abdominal pain throughout hospital course, at times relieved with gas/BM, w/u negative, seen by GI - no recs.  12/10 pt completed cipro and keflex for osteo treatment.  12/13 moderate amounts of secretions w/ blood - tranexamic acid neb given.      12/14:  S/p TXA nebulizer yesterday.  Bloody secretions were much improved today.  Held off on in-line PMV for today due to recent administration of TXA - will re-eval tomorrow.  Pt otherwise stable.  Pts daughter Marysol updated/involved in plan of care.   12/15:   Fever yesterday evening, 100.8F.  RVP and UA negative.  Blood cultures obtained.  Remains off antibiotics.  Patient complained of bottom tooth pain, gums appear inflamed but largely unchanged from prior exams except for notable loose teeth.  Dental reconsulted to evaluate for teeth extraction as patient and daughter amenable due to aspiration.  12/16-no new events 74 y/o F with PMHx of T2DM, HTN, HLD, anemia, hypothyroidism, RA, fibromyalgia, remote cerebral aneurysm repair, acute hypercapnic respiratory failure now with tracheostomy, PEG tube, and bedbound (trach change 8/18, PEG change 8/14), sacral pressure ulcer, BIBEMS from home for 6 day hx of bleeding from the tracheostomy and PEG tube with associated abdominal pain, recent history of auditory and visual hallucinations, and with chronic sacral decubitus ulcer. Exam notable for mild abdominal distention with LLQ and RLQ tenderness, tracheostomy in place with no obvious bleeding, PEG tube with scant amount of dried blood, at baseline neurologic status. Imaging showing no active bleeding around tracheostomy site, however was notable for mild thickening along PEG tube tract, sacral ulcer extending to the coccyx suggestive of possible osteomyelitis, and bibasilar patchy lung opacities with volume loss. Patient admitted for respiratory support, wound care of tracheostomy and PEG, and management of sacral osteomyelitis. No further Trach and peg site bleeding noted since admission.  Pelvic MRI imaging also suggestive of Acute OM. Patient placed on long-term antibiotics with Infectious disease guidance.  Additionally treated with a 7d course of Cefepime due to PSA and Serratia tracheitis on 11/8-->11/15 and then resumed cipro/keflex.  Hospital course c/b Delirium for which Seroquel was added and home Lexapro continued. Tracheostomy changed to #9 Cuffed [place] by ENT 11/3.  Despite trach change patient remained with persistent air leak.  On 11/19, the trach was again changed due to leak and loss of volumes on vent.  Trach was changed to #8 distal cuffed [place].  Patient seen by Dermatology for back lesions.  One diagnosed as a seborrheic keratitis and the other a dermatofibroma.  Intermittent abdominal pain throughout hospital course, at times relieved with gas/BM, w/u negative, seen by GI - no recs.  12/10 pt completed cipro and keflex for osteo treatment.  12/13 moderate amounts of secretions w/ blood - tranexamic acid neb given.      12/14:  S/p TXA nebulizer yesterday.  Bloody secretions were much improved today.  Held off on in-line PMV for today due to recent administration of TXA - will re-eval tomorrow.  Pt otherwise stable.  Pts daughter Marysol updated/involved in plan of care.   12/15:   Fever yesterday evening, 100.8F.  RVP and UA negative.  Blood cultures obtained.  Remains off antibiotics.  Patient complained of bottom tooth pain, gums appear inflamed but largely unchanged from prior exams except for notable loose teeth.  Dental reconsulted to evaluate for teeth extraction as patient and daughter amenable due to aspiration.  12/16-no new events 74 y/o F with PMHx of T2DM, HTN, HLD, anemia, hypothyroidism, RA, fibromyalgia, remote cerebral aneurysm repair, acute hypercapnic respiratory failure now with tracheostomy, PEG tube, and bedbound (trach change 8/18, PEG change 8/14), sacral pressure ulcer, BIBEMS from home for 6 day hx of bleeding from the tracheostomy and PEG tube with associated abdominal pain, recent history of auditory and visual hallucinations, and with chronic sacral decubitus ulcer. Exam notable for mild abdominal distention with LLQ and RLQ tenderness, tracheostomy in place with no obvious bleeding, PEG tube with scant amount of dried blood, at baseline neurologic status. Imaging showing no active bleeding around tracheostomy site, however was notable for mild thickening along PEG tube tract, sacral ulcer extending to the coccyx suggestive of possible osteomyelitis, and bibasilar patchy lung opacities with volume loss. Patient admitted for respiratory support, wound care of tracheostomy and PEG, and management of sacral osteomyelitis. No further Trach and peg site bleeding noted since admission.  Pelvic MRI imaging also suggestive of Acute OM. Patient placed on long-term antibiotics with Infectious disease guidance.  Additionally treated with a 7d course of Cefepime due to PSA and Serratia tracheitis on 11/8-->11/15 and then resumed cipro/keflex.  Hospital course c/b Delirium for which Seroquel was added and home Lexapro continued. Tracheostomy changed to #9 Cuffed [place] by ENT 11/3.  Despite trach change patient remained with persistent air leak.  On 11/19, the trach was again changed due to leak and loss of volumes on vent.  Trach was changed to #8 distal cuffed [place].  Patient seen by Dermatology for back lesions.  One diagnosed as a seborrheic keratitis and the other a dermatofibroma.  Intermittent abdominal pain throughout hospital course, at times relieved with gas/BM, w/u negative, seen by GI - no recs.  12/10 pt completed cipro and keflex for osteo treatment.  12/13 moderate amounts of secretions w/ blood - tranexamic acid neb given.      12/14:  S/p TXA nebulizer yesterday.  Bloody secretions were much improved today.  Held off on in-line PMV for today due to recent administration of TXA - will re-eval tomorrow.  Pt otherwise stable.  Pts daughter Marysol updated/involved in plan of care.   12/15:   Fever yesterday evening, 100.8F.  RVP and UA negative.  Blood cultures obtained.  Remains off antibiotics.  Patient complained of bottom tooth pain, gums appear inflamed but largely unchanged from prior exams except for notable loose teeth.  Dental reconsulted to evaluate for teeth extraction as patient and daughter amenable due to aspiration.  12/16-no new events  12/17-no new events 72 y/o F with PMHx of T2DM, HTN, HLD, anemia, hypothyroidism, RA, fibromyalgia, remote cerebral aneurysm repair, acute hypercapnic respiratory failure now with tracheostomy, PEG tube, and bedbound (trach change 8/18, PEG change 8/14), sacral pressure ulcer, BIBEMS from home for 6 day hx of bleeding from the tracheostomy and PEG tube with associated abdominal pain, recent history of auditory and visual hallucinations, and with chronic sacral decubitus ulcer. Exam notable for mild abdominal distention with LLQ and RLQ tenderness, tracheostomy in place with no obvious bleeding, PEG tube with scant amount of dried blood, at baseline neurologic status. Imaging showing no active bleeding around tracheostomy site, however was notable for mild thickening along PEG tube tract, sacral ulcer extending to the coccyx suggestive of possible osteomyelitis, and bibasilar patchy lung opacities with volume loss. Patient admitted for respiratory support, wound care of tracheostomy and PEG, and management of sacral osteomyelitis. No further Trach and peg site bleeding noted since admission.  Pelvic MRI imaging also suggestive of Acute OM. Patient placed on long-term antibiotics with Infectious disease guidance.  Additionally treated with a 7d course of Cefepime due to PSA and Serratia tracheitis on 11/8-->11/15 and then resumed cipro/keflex.  Hospital course c/b Delirium for which Seroquel was added and home Lexapro continued. Tracheostomy changed to #9 Cuffed [place] by ENT 11/3.  Despite trach change patient remained with persistent air leak.  On 11/19, the trach was again changed due to leak and loss of volumes on vent.  Trach was changed to #8 distal cuffed [place].  Patient seen by Dermatology for back lesions.  One diagnosed as a seborrheic keratitis and the other a dermatofibroma.  Intermittent abdominal pain throughout hospital course, at times relieved with gas/BM, w/u negative, seen by GI - no recs.  12/10 pt completed cipro and keflex for osteo treatment.  12/13 moderate amounts of secretions w/ blood - tranexamic acid neb given.      12/14:  S/p TXA nebulizer yesterday.  Bloody secretions were much improved today.  Held off on in-line PMV for today due to recent administration of TXA - will re-eval tomorrow.  Pt otherwise stable.  Pts daughter Marysol updated/involved in plan of care.   12/15:   Fever yesterday evening, 100.8F.  RVP and UA negative.  Blood cultures obtained.  Remains off antibiotics.  Patient complained of bottom tooth pain, gums appear inflamed but largely unchanged from prior exams except for notable loose teeth.  Dental reconsulted to evaluate for teeth extraction as patient and daughter amenable due to aspiration.  12/16-no new events  12/17-no new events

## 2023-12-18 LAB
ANION GAP SERPL CALC-SCNC: 10 MMOL/L — SIGNIFICANT CHANGE UP (ref 5–17)
ANION GAP SERPL CALC-SCNC: 10 MMOL/L — SIGNIFICANT CHANGE UP (ref 5–17)
BUN SERPL-MCNC: 18 MG/DL — SIGNIFICANT CHANGE UP (ref 7–23)
BUN SERPL-MCNC: 18 MG/DL — SIGNIFICANT CHANGE UP (ref 7–23)
CALCIUM SERPL-MCNC: 9.5 MG/DL — SIGNIFICANT CHANGE UP (ref 8.4–10.5)
CALCIUM SERPL-MCNC: 9.5 MG/DL — SIGNIFICANT CHANGE UP (ref 8.4–10.5)
CHLORIDE SERPL-SCNC: 98 MMOL/L — SIGNIFICANT CHANGE UP (ref 96–108)
CHLORIDE SERPL-SCNC: 98 MMOL/L — SIGNIFICANT CHANGE UP (ref 96–108)
CO2 SERPL-SCNC: 29 MMOL/L — SIGNIFICANT CHANGE UP (ref 22–31)
CO2 SERPL-SCNC: 29 MMOL/L — SIGNIFICANT CHANGE UP (ref 22–31)
CREAT SERPL-MCNC: <0.3 MG/DL — LOW (ref 0.5–1.3)
CREAT SERPL-MCNC: <0.3 MG/DL — LOW (ref 0.5–1.3)
EGFR: 113 ML/MIN/1.73M2 — SIGNIFICANT CHANGE UP
EGFR: 113 ML/MIN/1.73M2 — SIGNIFICANT CHANGE UP
GLUCOSE BLDC GLUCOMTR-MCNC: 138 MG/DL — HIGH (ref 70–99)
GLUCOSE BLDC GLUCOMTR-MCNC: 138 MG/DL — HIGH (ref 70–99)
GLUCOSE BLDC GLUCOMTR-MCNC: 147 MG/DL — HIGH (ref 70–99)
GLUCOSE BLDC GLUCOMTR-MCNC: 147 MG/DL — HIGH (ref 70–99)
GLUCOSE BLDC GLUCOMTR-MCNC: 150 MG/DL — HIGH (ref 70–99)
GLUCOSE BLDC GLUCOMTR-MCNC: 150 MG/DL — HIGH (ref 70–99)
GLUCOSE BLDC GLUCOMTR-MCNC: 158 MG/DL — HIGH (ref 70–99)
GLUCOSE BLDC GLUCOMTR-MCNC: 158 MG/DL — HIGH (ref 70–99)
GLUCOSE SERPL-MCNC: 153 MG/DL — HIGH (ref 70–99)
GLUCOSE SERPL-MCNC: 153 MG/DL — HIGH (ref 70–99)
HCT VFR BLD CALC: 31.3 % — LOW (ref 34.5–45)
HCT VFR BLD CALC: 31.3 % — LOW (ref 34.5–45)
HGB BLD-MCNC: 9.3 G/DL — LOW (ref 11.5–15.5)
HGB BLD-MCNC: 9.3 G/DL — LOW (ref 11.5–15.5)
MAGNESIUM SERPL-MCNC: 1.6 MG/DL — SIGNIFICANT CHANGE UP (ref 1.6–2.6)
MAGNESIUM SERPL-MCNC: 1.6 MG/DL — SIGNIFICANT CHANGE UP (ref 1.6–2.6)
MCHC RBC-ENTMCNC: 27.5 PG — SIGNIFICANT CHANGE UP (ref 27–34)
MCHC RBC-ENTMCNC: 27.5 PG — SIGNIFICANT CHANGE UP (ref 27–34)
MCHC RBC-ENTMCNC: 29.7 GM/DL — LOW (ref 32–36)
MCHC RBC-ENTMCNC: 29.7 GM/DL — LOW (ref 32–36)
MCV RBC AUTO: 92.6 FL — SIGNIFICANT CHANGE UP (ref 80–100)
MCV RBC AUTO: 92.6 FL — SIGNIFICANT CHANGE UP (ref 80–100)
NRBC # BLD: 0 /100 WBCS — SIGNIFICANT CHANGE UP (ref 0–0)
NRBC # BLD: 0 /100 WBCS — SIGNIFICANT CHANGE UP (ref 0–0)
PHOSPHATE SERPL-MCNC: 4.2 MG/DL — SIGNIFICANT CHANGE UP (ref 2.5–4.5)
PHOSPHATE SERPL-MCNC: 4.2 MG/DL — SIGNIFICANT CHANGE UP (ref 2.5–4.5)
PLATELET # BLD AUTO: 117 K/UL — LOW (ref 150–400)
PLATELET # BLD AUTO: 117 K/UL — LOW (ref 150–400)
POTASSIUM SERPL-MCNC: 4 MMOL/L — SIGNIFICANT CHANGE UP (ref 3.5–5.3)
POTASSIUM SERPL-MCNC: 4 MMOL/L — SIGNIFICANT CHANGE UP (ref 3.5–5.3)
POTASSIUM SERPL-SCNC: 4 MMOL/L — SIGNIFICANT CHANGE UP (ref 3.5–5.3)
POTASSIUM SERPL-SCNC: 4 MMOL/L — SIGNIFICANT CHANGE UP (ref 3.5–5.3)
RBC # BLD: 3.38 M/UL — LOW (ref 3.8–5.2)
RBC # BLD: 3.38 M/UL — LOW (ref 3.8–5.2)
RBC # FLD: 16.5 % — HIGH (ref 10.3–14.5)
RBC # FLD: 16.5 % — HIGH (ref 10.3–14.5)
SODIUM SERPL-SCNC: 137 MMOL/L — SIGNIFICANT CHANGE UP (ref 135–145)
SODIUM SERPL-SCNC: 137 MMOL/L — SIGNIFICANT CHANGE UP (ref 135–145)
WBC # BLD: 7.95 K/UL — SIGNIFICANT CHANGE UP (ref 3.8–10.5)
WBC # BLD: 7.95 K/UL — SIGNIFICANT CHANGE UP (ref 3.8–10.5)
WBC # FLD AUTO: 7.95 K/UL — SIGNIFICANT CHANGE UP (ref 3.8–10.5)
WBC # FLD AUTO: 7.95 K/UL — SIGNIFICANT CHANGE UP (ref 3.8–10.5)

## 2023-12-18 PROCEDURE — 99233 SBSQ HOSP IP/OBS HIGH 50: CPT

## 2023-12-18 RX ORDER — IPRATROPIUM/ALBUTEROL SULFATE 18-103MCG
3 AEROSOL WITH ADAPTER (GRAM) INHALATION
Qty: 99 | Refills: 5
Start: 2023-12-18 | End: 2024-06-14

## 2023-12-18 RX ORDER — IPRATROPIUM/ALBUTEROL SULFATE 18-103MCG
3 AEROSOL WITH ADAPTER (GRAM) INHALATION
Qty: 360 | Refills: 5
Start: 2023-12-18 | End: 2024-06-14

## 2023-12-18 RX ORDER — TRANEXAMIC ACID 100 MG/ML
250 INJECTION, SOLUTION INTRAVENOUS EVERY 8 HOURS
Refills: 0 | Status: COMPLETED | OUTPATIENT
Start: 2023-12-18 | End: 2023-12-18

## 2023-12-18 RX ADMIN — Medication 3 MILLILITER(S): at 18:29

## 2023-12-18 RX ADMIN — AER TRAVELER 1 APPLICATION(S): 0.5 SOLUTION RECTAL; TOPICAL at 12:12

## 2023-12-18 RX ADMIN — AER TRAVELER 1 APPLICATION(S): 0.5 SOLUTION RECTAL; TOPICAL at 06:42

## 2023-12-18 RX ADMIN — INSULIN HUMAN 11 UNIT(S): 100 INJECTION, SOLUTION SUBCUTANEOUS at 12:23

## 2023-12-18 RX ADMIN — TRANEXAMIC ACID 250 MILLIGRAM(S): 100 INJECTION, SOLUTION INTRAVENOUS at 15:46

## 2023-12-18 RX ADMIN — NYSTATIN CREAM 1 APPLICATION(S): 100000 CREAM TOPICAL at 13:48

## 2023-12-18 RX ADMIN — SIMETHICONE 80 MILLIGRAM(S): 80 TABLET, CHEWABLE ORAL at 17:34

## 2023-12-18 RX ADMIN — INSULIN HUMAN 9 UNIT(S): 100 INJECTION, SOLUTION SUBCUTANEOUS at 17:41

## 2023-12-18 RX ADMIN — Medication 1 APPLICATION(S): at 01:02

## 2023-12-18 RX ADMIN — CHLORHEXIDINE GLUCONATE 15 MILLILITER(S): 213 SOLUTION TOPICAL at 06:43

## 2023-12-18 RX ADMIN — Medication 200 MILLIGRAM(S): at 17:34

## 2023-12-18 RX ADMIN — Medication 5 MILLIGRAM(S): at 22:57

## 2023-12-18 RX ADMIN — Medication 2 DROP(S): at 01:02

## 2023-12-18 RX ADMIN — Medication 3 MILLILITER(S): at 23:08

## 2023-12-18 RX ADMIN — Medication 3 MILLILITER(S): at 06:11

## 2023-12-18 RX ADMIN — PHENYLEPHRINE-SHARK LIVER OIL-MINERAL OIL-PETROLATUM RECTAL OINTMENT 1 APPLICATION(S): at 12:25

## 2023-12-18 RX ADMIN — GABAPENTIN 100 MILLIGRAM(S): 400 CAPSULE ORAL at 22:57

## 2023-12-18 RX ADMIN — Medication 15 MILLILITER(S): at 12:11

## 2023-12-18 RX ADMIN — Medication 200 MILLIGRAM(S): at 01:02

## 2023-12-18 RX ADMIN — Medication 125 MICROGRAM(S): at 04:52

## 2023-12-18 RX ADMIN — LIDOCAINE 1 PATCH: 4 CREAM TOPICAL at 02:46

## 2023-12-18 RX ADMIN — ZINC OXIDE 1 APPLICATION(S): 200 OINTMENT TOPICAL at 12:14

## 2023-12-18 RX ADMIN — QUETIAPINE FUMARATE 12.5 MILLIGRAM(S): 200 TABLET, FILM COATED ORAL at 17:42

## 2023-12-18 RX ADMIN — PANTOPRAZOLE SODIUM 40 MILLIGRAM(S): 20 TABLET, DELAYED RELEASE ORAL at 08:40

## 2023-12-18 RX ADMIN — Medication 1 APPLICATION(S): at 12:13

## 2023-12-18 RX ADMIN — SIMETHICONE 80 MILLIGRAM(S): 80 TABLET, CHEWABLE ORAL at 01:02

## 2023-12-18 RX ADMIN — Medication 1 APPLICATION(S): at 06:44

## 2023-12-18 RX ADMIN — Medication 1 MILLIGRAM(S): at 22:57

## 2023-12-18 RX ADMIN — AMLODIPINE BESYLATE 10 MILLIGRAM(S): 2.5 TABLET ORAL at 06:44

## 2023-12-18 RX ADMIN — ESCITALOPRAM OXALATE 10 MILLIGRAM(S): 10 TABLET, FILM COATED ORAL at 08:38

## 2023-12-18 RX ADMIN — INSULIN GLARGINE 18 UNIT(S): 100 INJECTION, SOLUTION SUBCUTANEOUS at 08:38

## 2023-12-18 RX ADMIN — Medication 500 MILLIGRAM(S): at 12:12

## 2023-12-18 RX ADMIN — LIDOCAINE 1 PATCH: 4 CREAM TOPICAL at 12:14

## 2023-12-18 RX ADMIN — SIMETHICONE 80 MILLIGRAM(S): 80 TABLET, CHEWABLE ORAL at 12:12

## 2023-12-18 RX ADMIN — Medication 1 TABLET(S): at 06:42

## 2023-12-18 RX ADMIN — CHLORHEXIDINE GLUCONATE 15 MILLILITER(S): 213 SOLUTION TOPICAL at 17:34

## 2023-12-18 RX ADMIN — Medication 200 MILLIGRAM(S): at 06:43

## 2023-12-18 RX ADMIN — Medication 200 MILLIGRAM(S): at 12:11

## 2023-12-18 RX ADMIN — Medication 5 MILLIGRAM(S): at 06:44

## 2023-12-18 RX ADMIN — Medication 2 DROP(S): at 12:13

## 2023-12-18 RX ADMIN — AER TRAVELER 1 APPLICATION(S): 0.5 SOLUTION RECTAL; TOPICAL at 01:02

## 2023-12-18 RX ADMIN — INSULIN HUMAN 24 UNIT(S): 100 INJECTION, SOLUTION SUBCUTANEOUS at 06:45

## 2023-12-18 RX ADMIN — CHLORHEXIDINE GLUCONATE 1 APPLICATION(S): 213 SOLUTION TOPICAL at 06:42

## 2023-12-18 RX ADMIN — Medication 1 APPLICATION(S): at 17:43

## 2023-12-18 RX ADMIN — Medication 2 DROP(S): at 06:43

## 2023-12-18 RX ADMIN — LIDOCAINE 5 MILLILITER(S): 4 CREAM TOPICAL at 06:43

## 2023-12-18 RX ADMIN — NYSTATIN CREAM 1 APPLICATION(S): 100000 CREAM TOPICAL at 06:43

## 2023-12-18 RX ADMIN — NYSTATIN CREAM 1 APPLICATION(S): 100000 CREAM TOPICAL at 22:57

## 2023-12-18 RX ADMIN — Medication 1: at 06:44

## 2023-12-18 RX ADMIN — AER TRAVELER 1 APPLICATION(S): 0.5 SOLUTION RECTAL; TOPICAL at 17:15

## 2023-12-18 RX ADMIN — TRANEXAMIC ACID 250 MILLIGRAM(S): 100 INJECTION, SOLUTION INTRAVENOUS at 21:51

## 2023-12-18 RX ADMIN — SIMETHICONE 80 MILLIGRAM(S): 80 TABLET, CHEWABLE ORAL at 06:44

## 2023-12-18 RX ADMIN — Medication 1 TABLET(S): at 17:34

## 2023-12-18 RX ADMIN — LIDOCAINE 5 MILLILITER(S): 4 CREAM TOPICAL at 17:43

## 2023-12-18 RX ADMIN — Medication 2 DROP(S): at 17:42

## 2023-12-18 RX ADMIN — Medication 3 MILLILITER(S): at 12:36

## 2023-12-18 NOTE — PROGRESS NOTE ADULT - PROBLEM SELECTOR PLAN 3
Seen by Dental Service on 11/27 and 12/15  12/15: IOE:  permanent dentition: multiple carious teeth, multiple missing teeth  Traumatic ulcer noted on alveolar ridge in edentulous site #4-5.  Erythematous and edematous gingiva noted in area of #10-13 and 22-23.   Generalized wear and broken down dentition.  Radiographs: Unable to take radiographs because patient was non-transportable per med team. Patient's daughter emailed radiographs from dental visit in October 2023. Radiographs show multiple worn and fractured dentition.   re: Dental F/U with outpatient dentist or [facility] clinic  12/16 pt wants inpatient extractions. will call back Dental service on Monday Seen by Dental Service on 11/27 and 12/15  12/15: IOE:  permanent dentition: multiple carious teeth, multiple missing teeth  Traumatic ulcer noted on alveolar ridge in edentulous site #4-5.  Erythematous and edematous gingiva noted in area of #10-13 and 22-23.   Generalized wear and broken down dentition.  Radiographs: Unable to take radiographs because patient was non-transportable per med team. Patient's daughter emailed radiographs from dental visit in October 2023. Radiographs show multiple worn and fractured dentition.   re: Dental F/U with outpatient dentist or [facility]  12/16 pt wants inpatient extractions. will call back Dental service on Monday Seen by Dental Service on 11/27 and 12/15  - initially seen to rule out infectious source - no signs on infection seen on exam  - 12/15 reconsulted for extraction - no inpatient extraction, f/u outpt  - 12/18 reached out to dental for dental attending to discuss case with pts daughter, Marysol

## 2023-12-18 NOTE — PROGRESS NOTE ADULT - ASSESSMENT
74 y/o F with PMHx of T2DM, HTN, HLD, anemia, hypothyroidism, RA, fibromyalgia, remote cerebral aneurysm repair, acute hypercapnic respiratory failure now with tracheostomy, PEG tube, and bedbound (trach change 8/18, PEG change 8/14), sacral pressure ulcer, BIBEMS from home for 6 day hx of bleeding from the tracheostomy and PEG tube with associated abdominal pain, recent history of auditory and visual hallucinations, and with chronic sacral decubitus ulcer. Exam notable for mild abdominal distention with LLQ and RLQ tenderness, tracheostomy in place with no obvious bleeding, PEG tube with scant amount of dried blood, at baseline neurologic status. Imaging showing no active bleeding around tracheostomy site, however was notable for mild thickening along PEG tube tract, sacral ulcer extending to the coccyx suggestive of possible osteomyelitis, and bibasilar patchy lung opacities with volume loss. Patient admitted for respiratory support, wound care of tracheostomy and PEG, and management of sacral osteomyelitis. No further Trach and peg site bleeding noted since admission.  Pelvic MRI imaging also suggestive of Acute OM. Patient placed on long-term antibiotics with Infectious disease guidance.  Additionally treated with a 7d course of Cefepime due to PSA and Serratia tracheitis on 11/8-->11/15 and then resumed cipro/keflex.  Hospital course c/b Delirium for which Seroquel was added and home Lexapro continued. Tracheostomy changed to #9 Cuffed [place] by ENT 11/3.  Despite trach change patient remained with persistent air leak.  On 11/19, the trach was again changed due to leak and loss of volumes on vent.  Trach was changed to #8 distal cuffed [place].  Patient seen by Dermatology for back lesions.  One diagnosed as a seborrheic keratitis and the other a dermatofibroma.  Intermittent abdominal pain throughout hospital course, at times relieved with gas/BM, w/u negative, seen by GI - no recs.  12/10 pt completed cipro and keflex for osteo treatment.  12/13 moderate amounts of secretions w/ blood - tranexamic acid neb given.      12/14:  S/p TXA nebulizer yesterday.  Bloody secretions were much improved today.  Held off on in-line PMV for today due to recent administration of TXA - will re-eval tomorrow.  Pt otherwise stable.  Pts daughter Marysol updated/involved in plan of care.   12/15:   Fever yesterday evening, 100.8F.  RVP and UA negative.  Blood cultures obtained.  Remains off antibiotics.  Patient complained of bottom tooth pain, gums appear inflamed but largely unchanged from prior exams except for notable loose teeth.  Dental reconsulted to evaluate for teeth extraction as patient and daughter amenable due to aspiration.  12/16-no new events  12/17-no new events 74 y/o F with PMHx of T2DM, HTN, HLD, anemia, hypothyroidism, RA, fibromyalgia, remote cerebral aneurysm repair, acute hypercapnic respiratory failure now with tracheostomy, PEG tube, and bedbound (trach change 8/18, PEG change 8/14), sacral pressure ulcer, BIBEMS from home for 6 day hx of bleeding from the tracheostomy and PEG tube with associated abdominal pain, recent history of auditory and visual hallucinations, and with chronic sacral decubitus ulcer. Exam notable for mild abdominal distention with LLQ and RLQ tenderness, tracheostomy in place with no obvious bleeding, PEG tube with scant amount of dried blood, at baseline neurologic status. Imaging showing no active bleeding around tracheostomy site, however was notable for mild thickening along PEG tube tract, sacral ulcer extending to the coccyx suggestive of possible osteomyelitis, and bibasilar patchy lung opacities with volume loss. Patient admitted for respiratory support, wound care of tracheostomy and PEG, and management of sacral osteomyelitis. No further Trach and peg site bleeding noted since admission.  Pelvic MRI imaging also suggestive of Acute OM. Patient placed on long-term antibiotics with Infectious disease guidance.  Additionally treated with a 7d course of Cefepime due to PSA and Serratia tracheitis on 11/8-->11/15 and then resumed cipro/keflex.  Hospital course c/b Delirium for which Seroquel was added and home Lexapro continued. Tracheostomy changed to #9 Cuffed [place] by ENT 11/3.  Despite trach change patient remained with persistent air leak.  On 11/19, the trach was again changed due to leak and loss of volumes on vent.  Trach was changed to #8 distal cuffed [place].  Patient seen by Dermatology for back lesions.  One diagnosed as a seborrheic keratitis and the other a dermatofibroma.  Intermittent abdominal pain throughout hospital course, at times relieved with gas/BM, w/u negative, seen by GI - no recs.  12/10 pt completed cipro and keflex for osteo treatment.  12/13 moderate amounts of secretions w/ blood - tranexamic acid neb given with notable improvement.  12/18 with blood tinged secretion again - TXA 250mg neb x2 doses.    12/18:  TXA 250mg x2 doses to be given for blood tinged secretions.  Dental called - Marysol (daughter) requesting phone call with attending to discuss recent recommendations for loose teeth.  PEG tube with leakage over the weekend - GI at bedside to assess, appreciate recs.   72 y/o F with PMHx of T2DM, HTN, HLD, anemia, hypothyroidism, RA, fibromyalgia, remote cerebral aneurysm repair, acute hypercapnic respiratory failure now with tracheostomy, PEG tube, and bedbound (trach change 8/18, PEG change 8/14), sacral pressure ulcer, BIBEMS from home for 6 day hx of bleeding from the tracheostomy and PEG tube with associated abdominal pain, recent history of auditory and visual hallucinations, and with chronic sacral decubitus ulcer. Exam notable for mild abdominal distention with LLQ and RLQ tenderness, tracheostomy in place with no obvious bleeding, PEG tube with scant amount of dried blood, at baseline neurologic status. Imaging showing no active bleeding around tracheostomy site, however was notable for mild thickening along PEG tube tract, sacral ulcer extending to the coccyx suggestive of possible osteomyelitis, and bibasilar patchy lung opacities with volume loss. Patient admitted for respiratory support, wound care of tracheostomy and PEG, and management of sacral osteomyelitis. No further Trach and peg site bleeding noted since admission.  Pelvic MRI imaging also suggestive of Acute OM. Patient placed on long-term antibiotics with Infectious disease guidance.  Additionally treated with a 7d course of Cefepime due to PSA and Serratia tracheitis on 11/8-->11/15 and then resumed cipro/keflex.  Hospital course c/b Delirium for which Seroquel was added and home Lexapro continued. Tracheostomy changed to #9 Cuffed [place] by ENT 11/3.  Despite trach change patient remained with persistent air leak.  On 11/19, the trach was again changed due to leak and loss of volumes on vent.  Trach was changed to #8 distal cuffed [place].  Patient seen by Dermatology for back lesions.  One diagnosed as a seborrheic keratitis and the other a dermatofibroma.  Intermittent abdominal pain throughout hospital course, at times relieved with gas/BM, w/u negative, seen by GI - no recs.  12/10 pt completed cipro and keflex for osteo treatment.  12/13 moderate amounts of secretions w/ blood - tranexamic acid neb given with notable improvement.  12/18 with blood tinged secretion again - TXA 250mg neb x2 doses.    12/18:  TXA 250mg x2 doses to be given for blood tinged secretions.  Dental called - Marysol (daughter) requesting phone call with attending to discuss recent recommendations for loose teeth.  PEG tube with leakage over the weekend - GI at bedside to assess, appreciate recs.   72 y/o F with PMHx of T2DM, HTN, HLD, anemia, hypothyroidism, RA, fibromyalgia, remote cerebral aneurysm repair, acute hypercapnic respiratory failure now with tracheostomy, PEG tube, and bedbound (trach change 8/18, PEG change 8/14), sacral pressure ulcer, BIBEMS from home for 6 day hx of bleeding from the tracheostomy and PEG tube with associated abdominal pain, recent history of auditory and visual hallucinations, and with chronic sacral decubitus ulcer. Exam notable for mild abdominal distention with LLQ and RLQ tenderness, tracheostomy in place with no obvious bleeding, PEG tube with scant amount of dried blood, at baseline neurologic status. Imaging showing no active bleeding around tracheostomy site, however was notable for mild thickening along PEG tube tract, sacral ulcer extending to the coccyx suggestive of possible osteomyelitis, and bibasilar patchy lung opacities with volume loss. Patient admitted for respiratory support, wound care of tracheostomy and PEG, and management of sacral osteomyelitis. No further Trach and peg site bleeding noted since admission.  Pelvic MRI imaging also suggestive of Acute OM. Patient placed on long-term antibiotics with Infectious disease guidance.  Additionally treated with a 7d course of Cefepime due to PSA and Serratia tracheitis on 11/8-->11/15 and then resumed cipro/keflex.  Hospital course c/b Delirium for which Seroquel was added and home Lexapro continued. Tracheostomy changed to #9 Cuffed [place] by ENT 11/3.  Despite trach change patient remained with persistent air leak.  On 11/19, the trach was again changed due to leak and loss of volumes on vent.  Trach was changed to #8 distal cuffed [place].  Patient seen by Dermatology for back lesions.  One diagnosed as a seborrheic keratitis and the other a dermatofibroma.  Intermittent abdominal pain throughout hospital course, at times relieved with gas/BM, w/u negative, seen by GI - no recs.  12/10 pt completed cipro and keflex for osteo treatment.  12/13 moderate amounts of secretions w/ blood - tranexamic acid neb given with notable improvement.  12/18 with blood tinged secretion again - TXA 250mg neb x2 doses.    12/18:  TXA 250mg x2 doses to be given for blood tinged secretions.  Dental called - Marysol (daughter) requesting phone call with attending to discuss recent recommendations for loose teeth.  PEG tube with leakage over the weekend - GI at bedside to assess, appreciate recs.  Reached out to Surgery - they only cover pegs placed by surgery.  Reached out to IR, reposition PEG as per IR.

## 2023-12-18 NOTE — PROGRESS NOTE ADULT - PROBLEM SELECTOR PLAN 4
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  - Tolerates intermittent PSV 15/5 during day/Vent HS  - Airway Clearance: Duoneb, Hypersal, Chest PT, PRN suctioning   - Maintain 02 sat > 92%    Tracheal Bleeding (Intermittent)-->resolved since admission   - S/p CT Angio neck: No evidence of active bleeding around tracheostomy site. No hematoma.  No collection   - Seen by ENT; Kath and b/l bronchi visualized without any trauma, small amount of blood tinged secretions resting at beginning of right bronchus not actively bleeding.  - 12/13 tracheal bleeding upon suctioning - tranexamic neb given Chronic Trach/ Vent dependant 2/2 Hypercapnic Resp Failure since 10/2022   - S/p Trach # 8 Cuffed Flex Shiley; trach change 8/18, PEG change 8/14 per records   - Continue Home vent settings: (300 TV/ RR 12/5 Peep/ Fio2 30%)  - 11/3 trach changed to #9 Portex by ENT  - 11/18 RR increased to 14 due to hypercarbia from trach collar trial  - 11/17: Due to persistent air leak and volume loss on vent, trach again changed by ENT to #8 distal cuffed Shiley, 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  - Tolerates intermittent PSV 15/5 during day/Vent HS  - Airway Clearance: Duoneb, Hypersal, Chest PT, PRN suctioning   - Maintain 02 sat > 92%    Tracheal Bleeding (Intermittent)-->resolved since admission   - S/p CT Angio neck: No evidence of active bleeding around tracheostomy site. No hematoma.  No collection   - Seen by ENT; Kath and b/l bronchi visualized without any trauma, small amount of blood tinged secretions resting at beginning of right bronchus not actively bleeding.  - 12/13 tracheal bleeding upon suctioning - tranexamic neb given  - 12/18 tranexamic neb x2 doses

## 2023-12-18 NOTE — PROGRESS NOTE ADULT - SUBJECTIVE AND OBJECTIVE BOX
Chief Complaint:  Patient is a 73y old  Female who presents with a chief complaint of bleeding (04 Dec 2023 16:57)      Date of service 12-18-23 @ 14:52      Interval Events:   tolerating tube feeds, minimal leakage from site  cough improved    Hospital Medications:  acetaminophen   Oral Liquid .. 1000 milliGRAM(s) Enteral Tube every 6 hours PRN  albuterol/ipratropium for Nebulization 3 milliLiter(s) Nebulizer every 6 hours  amLODIPine   Tablet 10 milliGRAM(s) Oral daily  artificial  tears Solution 2 Drop(s) Both EYES four times a day  ascorbic acid 500 milliGRAM(s) Oral daily  benzocaine 20% Spray 1 Spray(s) Topical four times a day PRN  calcium carbonate    500 mG (Tums) Chewable 1 Tablet(s) Chew every 12 hours  chlorhexidine 0.12% Liquid 15 milliLiter(s) Oral Mucosa every 12 hours  chlorhexidine 4% Liquid 1 Application(s) Topical <User Schedule>  dextrose 50% Injectable 12.5 Gram(s) IV Push once  dextrose 50% Injectable 25 Gram(s) IV Push once  dextrose 50% Injectable 50 milliLiter(s) IV Push once  diclofenac sodium 1% Gel 2 Gram(s) Topical four times a day PRN  diphenhydrAMINE Elixir 25 milliGRAM(s) Oral every 6 hours PRN  escitalopram 10 milliGRAM(s) Oral <User Schedule>  fentaNYL   Patch  25 MICROgram(s)/Hr 1 Patch Transdermal every 72 hours  gabapentin Solution 100 milliGRAM(s) Enteral Tube at bedtime  glucagon  Injectable 1 milliGRAM(s) IntraMuscular once  guaiFENesin Oral Liquid (Sugar-Free) 200 milliGRAM(s) Oral every 6 hours  guaifenesin/dextromethorphan Oral Liquid 10 milliLiter(s) Oral every 6 hours PRN  hemorrhoidal Ointment 1 Application(s) Rectal daily  hemorrhoidal Ointment      insulin glargine Injectable (LANTUS) 18 Unit(s) SubCutaneous every morning  insulin lispro (ADMELOG) corrective regimen sliding scale   SubCutaneous every 6 hours  insulin regular  human recombinant 9 Unit(s) SubCutaneous <User Schedule>  insulin regular  human recombinant 11 Unit(s) SubCutaneous <User Schedule>  insulin regular  human recombinant 24 Unit(s) SubCutaneous <User Schedule>  levothyroxine 125 MICROGram(s) Oral <User Schedule>  lidocaine   4% Patch 1 Patch Transdermal daily  lidocaine 2% Viscous 5 milliLiter(s) Mucosal two times a day  melatonin 5 milliGRAM(s) Oral at bedtime  melatonin 1 milliGRAM(s) Oral at bedtime  multivitamin/minerals/iron Oral Solution (CENTRUM) 15 milliLiter(s) Oral daily  nystatin Powder 1 Application(s) Topical every 8 hours  oxyCODONE    Solution 2.5 milliGRAM(s) Oral every 6 hours PRN  pantoprazole   Suspension 40 milliGRAM(s) Enteral Tube <User Schedule>  petrolatum Ophthalmic Ointment 1 Application(s) Both EYES four times a day  predniSONE   Tablet 5 milliGRAM(s) Oral daily  QUEtiapine 12.5 milliGRAM(s) Oral <User Schedule>  simethicone 80 milliGRAM(s) Chew four times a day  sodium chloride 0.65% Nasal 1 Spray(s) Both Nostrils every 6 hours PRN  tranexamic acid Injectable for Nebulization 250 milliGRAM(s) Inhalation every 8 hours  witch hazel Pads 1 Application(s) Topical four times a day  zinc oxide 40% Paste 1 Application(s) Topical daily        Review of Systems:  General:  No wt loss, fevers, chills, night sweats, fatigue,   Eyes:  Good vision, no reported pain  ENT:  No sore throat, pain, runny nose, dysphagia  CV:  No pain, palpitations, hypo/hypertension  Resp:  No dyspnea, cough, tachypnea, wheezing  GI:  See HPI  :  No pain, bleeding, incontinence, nocturia  Muscle:  No pain, weakness  Neuro:  No weakness, tingling, memory problems  Psych:  No fatigue, insomnia, mood problems, depression  Endocrine:  No polyuria, polydipsia, cold/heat intolerance  Heme:  No petechiae, ecchymosis, easy bruisability  Integumentary:  No rash, edema    PHYSICAL EXAM:   Vital Signs:  Vital Signs Last 24 Hrs  T(C): 37.3 (18 Dec 2023 06:02), Max: 37.3 (18 Dec 2023 06:02)  T(F): 99.1 (18 Dec 2023 06:02), Max: 99.1 (18 Dec 2023 06:02)  HR: 91 (18 Dec 2023 12:28) (77 - 101)  BP: 142/82 (18 Dec 2023 06:02) (128/59 - 142/82)  BP(mean): --  RR: 16 (18 Dec 2023 06:02) (16 - 19)  SpO2: 99% (18 Dec 2023 12:28) (97% - 100%)    Parameters below as of 18 Dec 2023 12:28  Patient On (Oxygen Delivery Method): ventilator      Daily     Daily       PHYSICAL EXAM:     GENERAL:  Appears stated age, well-groomed, well-nourished, no distress  HEENT:  NC/AT,  conjunctivae anicteric, clear and pink,   NECK: supple, trachea midline  CHEST:  Full & symmetric excursion, no increased effort, breath sounds clear  HEART:  Regular rhythm, no JVD  ABDOMEN:  Soft, non-tender, non-distended, normoactive bowel sounds,  no masses , no hepatosplenomegaly  EXTREMITIES:  no cyanosis,clubbing or edema  SKIN:  No rash, erythema, or, ecchymoses, no jaundice  NEURO:  Alert, non-focal, no asterixis  PSYCH: Appropriate affect, oriented to place and time  RECTAL: Deferred      LABS Personally reviewed by me:                        9.3    7.95  )-----------( 117      ( 18 Dec 2023 11:34 )             31.3     Mean Cell Volume: 92.6 fl (12-18-23 @ 11:34)    12-18    137  |  98  |  18  ----------------------------<  153<H>  4.0   |  29  |  <0.30<L>    Ca    9.5      18 Dec 2023 11:34  Phos  4.2     12-18  Mg     1.6     12-18          Urinalysis Basic - ( 18 Dec 2023 11:34 )    Color: x / Appearance: x / SG: x / pH: x  Gluc: 153 mg/dL / Ketone: x  / Bili: x / Urobili: x   Blood: x / Protein: x / Nitrite: x   Leuk Esterase: x / RBC: x / WBC x   Sq Epi: x / Non Sq Epi: x / Bacteria: x                              9.3    7.95  )-----------( 117      ( 18 Dec 2023 11:34 )             31.3       Imaging personally reviewed by me:

## 2023-12-18 NOTE — PROGRESS NOTE ADULT - PROBLEM SELECTOR PLAN 5
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   - PEG Site appears macerated --> Multifactorial, possible the PEG has been too tight at home  - Peg loosened by GI at beside, no leakage or further intervention required   - recurrent RLQ abdominal pain and bleeding at the PEG site  - reevaluated by GI -- no bleeding at the PEG site  - 12/1: s/p abdominal ultrasound - limited study   - (12/3): Pt is c/o abd pain, and daughter is concerned   - AM amylase and Lipase all wnl , CT A/P 12/3 - no acute findings  - Continue Simethicone  12/16  feed & mucous around PEG site after cough. Feed held x 2 hrs, site  CDI.  Robitussin Q6h for cough, resume feed.  GI FU called and left message 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   - PEG Site appears macerated --> Multifactorial, possible the PEG has been too tight at home  - Peg loosened by GI at beside, no leakage or further intervention required   - recurrent RLQ abdominal pain and bleeding at the PEG site  - reevaluated by GI -- no bleeding at the PEG site  - 12/1: s/p abdominal ultrasound - limited study   - (12/3): Pt is c/o abd pain, and daughter is concerned   - AM amylase and Lipase all wnl , CT A/P 12/3 - no acute findings  - Continue Simethicone  - 12/16 PEG tube leakage - GI recs appreciated

## 2023-12-18 NOTE — PROGRESS NOTE ADULT - NS ATTEND AMEND GEN_ALL_CORE FT
73F with a h/o DM, HTN, HLD, anemia, hypothyroidism, RA, fibromyalgia, remote cerebral aneurysm with repair, hypercapnic respiratory failure s/p tracheostomy/PEG, bedbound, sacral pressure ulcer. Admitted w/ bleeding from tracheostomy and PEG w/ associated abdominal pain, recent hx of auditory/visual hallucinations.   Imaging showed mild thickening along PEG tube tract and sacral ulcer extending to coccyx suggestive of acute osteomyelitis.      # Osteomyelitis  # Chronic hypoxemic RF  # oropharyngeal dysphagia  # acute on chronic pain  # Trach/Peg bleeding  # Tracheitis with Pseudomonas and serratia  # Hx of hallucination, anxiety     #Neuro - awake, alert, and interactive. moving all extremities, mouthing words. continue current medications  #Cardiovascular - hemodynamically stable  #Pulmonary - chronic hypoxemic respiratory failure, has 8XLT trach, on home vent, pressure support trials daily 15/5  - noted to have blood mixed with sputum upon suctioning. FiO2 requirements remain low - 30%FiO2 saturating well, d/marli hypertonic saline and improved with 1 dose TXA neb. Recurrence of pink tinged sputum - repeat TXAx2, minimize suctioning as needed  #Gastro - oropharyngeal dysphagia with PEG tube in place, tolerating feeds, nutrition follow up appreciated   Abdominal sono 12/1 and CT abdomen 12/4- No acute intra-abdominal findings.   Appreciate GI eval of PEG site  #ID - sacral osteo on MRI - wound care follow-up appreciated, c/w offloading and local wound care  -The patient has had this wound since a hospitalization in December 2022 and per daughter does not have chronic or multiple wounds but only this single wound.   - s/p 6 week course of Cipro and Keflex as per ID - completed 12/10/23  - Per daughter, patient has had prior multiple infections during hospitalizations including prior pseudomonas, MRSA, E faecalis, and Klebsiella  - Sputum colonized with MDR Pseudomonas   - Daughter reported a recent dental abscess and being told by outpatient dentist that patient needed teeth extracted - Dental evaluation noted with no plans for intervention as inpatient.   12/14 - febrile to 100.8, infectious work up negative, likely 2/2 atelectasis  #Hem/Onc - prior cytopenias in setting of antibiotics per patient's daughter   - Hem/Onc follow-up noted - suspect an anemia of chronic disease  #Pain - continue current pain control - in setting of fibromyalgia and pain from wound  - Voltaren topical, lidocaine patches and low dose Oxycodone as needed  #Derm - previously seen by derm for skin lesions - mid back with irritated seborrheic keratosis - outpatient follow-up  #Endo - DM2 - continue insulin and adjust as needed for goal FS < 180  - Endocrine follow-up appreciated   - Continue Synthroid - TSH WNL  #DVT proph - SCDs  - Prophylactic AC stopped after concern for prior bleeding.  Discharge planning. Above was d/w the patient's daughter Marysol at length at bedside

## 2023-12-18 NOTE — PROGRESS NOTE ADULT - SUBJECTIVE AND OBJECTIVE BOX
Patient is a 73y old  Female who presents with a chief complaint of bleeding (04 Dec 2023 16:57)      Interval Events:    REVIEW OF SYSTEMS:  [ ] Positive  [ ] All other systems negative  [ ] Unable to assess ROS because ________    Vital Signs Last 24 Hrs  T(C): 37.3 (12-18-23 @ 06:02), Max: 37.3 (12-18-23 @ 06:02)  T(F): 99.1 (12-18-23 @ 06:02), Max: 99.1 (12-18-23 @ 06:02)  HR: 91 (12-18-23 @ 06:19) (77 - 101)  BP: 142/82 (12-18-23 @ 06:02) (123/73 - 142/82)  RR: 16 (12-18-23 @ 06:02) (16 - 22)  SpO2: 100% (12-18-23 @ 06:19) (97% - 100%)    PHYSICAL EXAM:  HEENT:   [ ]Tracheostomy:  [ ]Pupils equal  [ ]No oral lesions  [ ]Abnormal    SKIN  [ ]No Rash  [ ] Abnormal  [ ] pressure    CARDIAC  [ ]Regular  [ ]Abnormal    PULMONARY  [ ]Bilateral Clear Breath Sounds  [ ]Normal Excursion  [ ]Abnormal    GI  [ ]PEG      [ ] +BS		              [ ]Soft, nondistended, nontender	  [ ]Abnormal    MUSCULOSKELETAL                                   [ ]Bedbound                 [ ]Abnormal    [ ]Ambulatory/OOB to chair                           EXTREMITIES                                         [ ]Normal  [ ]Edema                           NEUROLOGIC  [ ] Normal, non focal  [ ] Focal findings:    PSYCHIATRIC  [ ]Alert and appropriate  [ ] Sedated	 [ ]Agitated    :  Mcbride: [ ] Yes, if yes: Date of Placement:                   [  ] No    LINES: Central Lines [ ] Yes, if yes: Date of Placement                                     [  ] No    HOSPITAL MEDICATIONS:  MEDICATIONS  (STANDING):  albuterol/ipratropium for Nebulization 3 milliLiter(s) Nebulizer every 6 hours  amLODIPine   Tablet 10 milliGRAM(s) Oral daily  artificial  tears Solution 2 Drop(s) Both EYES four times a day  ascorbic acid 500 milliGRAM(s) Oral daily  calcium carbonate    500 mG (Tums) Chewable 1 Tablet(s) Chew every 12 hours  chlorhexidine 0.12% Liquid 15 milliLiter(s) Oral Mucosa every 12 hours  chlorhexidine 4% Liquid 1 Application(s) Topical <User Schedule>  dextrose 50% Injectable 12.5 Gram(s) IV Push once  dextrose 50% Injectable 25 Gram(s) IV Push once  dextrose 50% Injectable 50 milliLiter(s) IV Push once  escitalopram 10 milliGRAM(s) Oral <User Schedule>  fentaNYL   Patch  25 MICROgram(s)/Hr 1 Patch Transdermal every 72 hours  gabapentin Solution 100 milliGRAM(s) Enteral Tube at bedtime  glucagon  Injectable 1 milliGRAM(s) IntraMuscular once  guaiFENesin Oral Liquid (Sugar-Free) 200 milliGRAM(s) Oral every 6 hours  hemorrhoidal Ointment 1 Application(s) Rectal daily  hemorrhoidal Ointment      insulin glargine Injectable (LANTUS) 18 Unit(s) SubCutaneous every morning  insulin lispro (ADMELOG) corrective regimen sliding scale   SubCutaneous every 6 hours  insulin regular  human recombinant 24 Unit(s) SubCutaneous <User Schedule>  insulin regular  human recombinant 11 Unit(s) SubCutaneous <User Schedule>  insulin regular  human recombinant 9 Unit(s) SubCutaneous <User Schedule>  levothyroxine 125 MICROGram(s) Oral <User Schedule>  lidocaine   4% Patch 1 Patch Transdermal daily  lidocaine 2% Viscous 5 milliLiter(s) Mucosal two times a day  melatonin 5 milliGRAM(s) Oral at bedtime  melatonin 1 milliGRAM(s) Oral at bedtime  multivitamin/minerals/iron Oral Solution (CENTRUM) 15 milliLiter(s) Oral daily  nystatin Powder 1 Application(s) Topical every 8 hours  pantoprazole   Suspension 40 milliGRAM(s) Enteral Tube <User Schedule>  petrolatum Ophthalmic Ointment 1 Application(s) Both EYES four times a day  predniSONE   Tablet 5 milliGRAM(s) Oral daily  QUEtiapine 12.5 milliGRAM(s) Oral <User Schedule>  simethicone 80 milliGRAM(s) Chew four times a day  witch hazel Pads 1 Application(s) Topical four times a day  zinc oxide 40% Paste 1 Application(s) Topical daily    MEDICATIONS  (PRN):  acetaminophen   Oral Liquid .. 1000 milliGRAM(s) Enteral Tube every 6 hours PRN Temp greater or equal to 38C (100.4F), Mild Pain (1 - 3)  benzocaine 20% Spray 1 Spray(s) Topical four times a day PRN Cough  diclofenac sodium 1% Gel 2 Gram(s) Topical four times a day PRN pain  diphenhydrAMINE Elixir 25 milliGRAM(s) Oral every 6 hours PRN Rash and/or Itching  guaifenesin/dextromethorphan Oral Liquid 10 milliLiter(s) Oral every 6 hours PRN Cough  oxyCODONE    Solution 2.5 milliGRAM(s) Oral every 6 hours PRN Moderate Pain (4 - 6)  sodium chloride 0.65% Nasal 1 Spray(s) Both Nostrils every 6 hours PRN Nasal Congestion      LABS:                  CAPILLARY BLOOD GLUCOSE    MICROBIOLOGY:     RADIOLOGY:  [ ] Reviewed and interpreted by me    Mode: AC/ CMV (Assist Control/ Continuous Mandatory Ventilation)  RR (machine): 12  TV (machine): 300  FiO2: 30  PEEP: 5  ITime: 1  MAP: 8  PIP: 26   Patient is a 73y old  Female who presents with a chief complaint of bleeding (04 Dec 2023 16:57)      Interval Events:  No acute events overnight.     REVIEW OF SYSTEMS:  [ ] Positive  [X] All other systems negative  [ ] Unable to assess ROS because ________    Vital Signs Last 24 Hrs  T(C): 37.3 (12-18-23 @ 06:02), Max: 37.3 (12-18-23 @ 06:02)  T(F): 99.1 (12-18-23 @ 06:02), Max: 99.1 (12-18-23 @ 06:02)  HR: 91 (12-18-23 @ 06:19) (77 - 101)  BP: 142/82 (12-18-23 @ 06:02) (123/73 - 142/82)  RR: 16 (12-18-23 @ 06:02) (16 - 22)  SpO2: 100% (12-18-23 @ 06:19) (97% - 100%)    PHYSICAL EXAM:  HEENT:   [X]Tracheostomy: #8 distal XLT Shiley   [X]Pupils equal  [ ]No oral lesions  [X]Abnormal - + missing teeth, + loose teeth    SKIN  [ ]No Rash  [ ] Abnormal  [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    CARDIAC  [X]Regular  [ ]Abnormal    PULMONARY  [ ]Bilateral Clear Breath Sounds  [ ]Normal Excursion  [X]Abnormal - minimally coarse BS BL    GI  [X]PEG - site C/D/I, minimal leakage noted      [X] +BS		              [X]Soft, nondistended, nontender	  [ ]Abnormal    MUSCULOSKELETAL                                   [X]Bedbound                 [ ]Abnormal    [ ]Ambulatory/OOB to chair                           EXTREMITIES                                         [X]Normal  [ ]Edema                           NEUROLOGIC  [X] Normal, non focal: opens eyes spontaneously, followings commands on all 4 extremities  [ ] Focal findings:    PSYCHIATRIC  [X]Alert and appropriate  [ ] Sedated	 [ ]Agitated    :  Mcbride: [ ] Yes, if yes: Date of Placement:                   [X] No    LINES: Central Lines [ ] Yes, if yes: Date of Placement                                     [X] No    HOSPITAL MEDICATIONS:  MEDICATIONS  (STANDING):  albuterol/ipratropium for Nebulization 3 milliLiter(s) Nebulizer every 6 hours  amLODIPine   Tablet 10 milliGRAM(s) Oral daily  artificial  tears Solution 2 Drop(s) Both EYES four times a day  ascorbic acid 500 milliGRAM(s) Oral daily  calcium carbonate    500 mG (Tums) Chewable 1 Tablet(s) Chew every 12 hours  chlorhexidine 0.12% Liquid 15 milliLiter(s) Oral Mucosa every 12 hours  chlorhexidine 4% Liquid 1 Application(s) Topical <User Schedule>  dextrose 50% Injectable 12.5 Gram(s) IV Push once  dextrose 50% Injectable 25 Gram(s) IV Push once  dextrose 50% Injectable 50 milliLiter(s) IV Push once  escitalopram 10 milliGRAM(s) Oral <User Schedule>  fentaNYL   Patch  25 MICROgram(s)/Hr 1 Patch Transdermal every 72 hours  gabapentin Solution 100 milliGRAM(s) Enteral Tube at bedtime  glucagon  Injectable 1 milliGRAM(s) IntraMuscular once  guaiFENesin Oral Liquid (Sugar-Free) 200 milliGRAM(s) Oral every 6 hours  hemorrhoidal Ointment 1 Application(s) Rectal daily  hemorrhoidal Ointment      insulin glargine Injectable (LANTUS) 18 Unit(s) SubCutaneous every morning  insulin lispro (ADMELOG) corrective regimen sliding scale   SubCutaneous every 6 hours  insulin regular  human recombinant 24 Unit(s) SubCutaneous <User Schedule>  insulin regular  human recombinant 11 Unit(s) SubCutaneous <User Schedule>  insulin regular  human recombinant 9 Unit(s) SubCutaneous <User Schedule>  levothyroxine 125 MICROGram(s) Oral <User Schedule>  lidocaine   4% Patch 1 Patch Transdermal daily  lidocaine 2% Viscous 5 milliLiter(s) Mucosal two times a day  melatonin 5 milliGRAM(s) Oral at bedtime  melatonin 1 milliGRAM(s) Oral at bedtime  multivitamin/minerals/iron Oral Solution (CENTRUM) 15 milliLiter(s) Oral daily  nystatin Powder 1 Application(s) Topical every 8 hours  pantoprazole   Suspension 40 milliGRAM(s) Enteral Tube <User Schedule>  petrolatum Ophthalmic Ointment 1 Application(s) Both EYES four times a day  predniSONE   Tablet 5 milliGRAM(s) Oral daily  QUEtiapine 12.5 milliGRAM(s) Oral <User Schedule>  simethicone 80 milliGRAM(s) Chew four times a day  witch hazel Pads 1 Application(s) Topical four times a day  zinc oxide 40% Paste 1 Application(s) Topical daily    MEDICATIONS  (PRN):  acetaminophen   Oral Liquid .. 1000 milliGRAM(s) Enteral Tube every 6 hours PRN Temp greater or equal to 38C (100.4F), Mild Pain (1 - 3)  benzocaine 20% Spray 1 Spray(s) Topical four times a day PRN Cough  diclofenac sodium 1% Gel 2 Gram(s) Topical four times a day PRN pain  diphenhydrAMINE Elixir 25 milliGRAM(s) Oral every 6 hours PRN Rash and/or Itching  guaifenesin/dextromethorphan Oral Liquid 10 milliLiter(s) Oral every 6 hours PRN Cough  oxyCODONE    Solution 2.5 milliGRAM(s) Oral every 6 hours PRN Moderate Pain (4 - 6)  sodium chloride 0.65% Nasal 1 Spray(s) Both Nostrils every 6 hours PRN Nasal Congestion      LABS:                  CAPILLARY BLOOD GLUCOSE    MICROBIOLOGY:     RADIOLOGY:  [ ] Reviewed and interpreted by me    Mode: AC/ CMV (Assist Control/ Continuous Mandatory Ventilation)  RR (machine): 12  TV (machine): 300  FiO2: 30  PEEP: 5  ITime: 1  MAP: 8  PIP: 26

## 2023-12-18 NOTE — PROGRESS NOTE ADULT - PROBLEM SELECTOR PLAN 8
- s/p Home Levemir 14 units in morning held on admission as noted w/ hypoglycemia   - Enteral feed changed to [employer] diabetic formula; glucose numbers stabilizing  - adjustments made throughout admission per endocrine recs - s/p Home Levemir 14 units in morning held on admission as noted w/ hypoglycemia   - Enteral feed changed to [employer] diabetic formula; glucose numbers stabilizing  - adjustments made throughout admission per endocrine recs

## 2023-12-18 NOTE — PROGRESS NOTE ADULT - ASSESSMENT
73 year old female with respiratory failure s/p trach and PEG, sacral osteomyelitis.     leakage from chronic PEG site. no signs of infection and site looks intact with some granulation tissue around the tube. discussed with daughter at bedside, limited options available and I would avoid a repeat PEG.  - would trial topical silver nitrate to facilitate tract closure       [facility]  Andrew Morgan M.D.   [address], [place]  Office: [phone]  Cell: [phone]       73 year old female with respiratory failure s/p trach and PEG, sacral osteomyelitis.     leakage from chronic PEG site. no signs of infection and site looks intact with some granulation tissue around the tube. discussed with daughter at bedside, limited options available and I would avoid a repeat PEG.  - would trial topical silver nitrate to facilitate tract closure       [facility]  Andrew Morgan M.D.   [address], [place]  Office: [phone]  Cell: [phone]       73 year old female with respiratory failure s/p trach and PEG, sacral osteomyelitis.     leakage from chronic PEG site. no signs of infection and site looks intact with some granulation tissue around the tube. discussed with daughter at bedside, limited options available and I would avoid a repeat PEG.  - would trial topical silver nitrate to facilitate tract closure   - surgery consult for possible suture to the site   - decrease rate of tube feeds / administer TF and meds by gravity if feasible  - cont PPI      [facility]  Andrew Morgan M.D.   [address] [place] [address]  Office: [phone]  Cell: [phone]       73 year old female with respiratory failure s/p trach and PEG, sacral osteomyelitis.     leakage from chronic PEG site. no signs of infection and site looks intact with some granulation tissue around the tube. discussed with daughter at bedside, limited options available and I would avoid a repeat PEG.  - would trial topical silver nitrate to facilitate tract closure   - surgery consult for possible suture to the site   - decrease rate of tube feeds / administer TF and meds by gravity if feasible  - cont PPI      [facility]  Andrew Morgan M.D.   [address] [place] [address]  Office: [phone]  Cell: [phone]

## 2023-12-19 LAB
APPEARANCE UR: CLEAR — SIGNIFICANT CHANGE UP
APPEARANCE UR: CLEAR — SIGNIFICANT CHANGE UP
BILIRUB UR-MCNC: NEGATIVE — SIGNIFICANT CHANGE UP
BILIRUB UR-MCNC: NEGATIVE — SIGNIFICANT CHANGE UP
COLOR SPEC: YELLOW — SIGNIFICANT CHANGE UP
COLOR SPEC: YELLOW — SIGNIFICANT CHANGE UP
CULTURE RESULTS: SIGNIFICANT CHANGE UP
CULTURE RESULTS: SIGNIFICANT CHANGE UP
DIFF PNL FLD: NEGATIVE — SIGNIFICANT CHANGE UP
DIFF PNL FLD: NEGATIVE — SIGNIFICANT CHANGE UP
GLUCOSE BLDC GLUCOMTR-MCNC: 151 MG/DL — HIGH (ref 70–99)
GLUCOSE BLDC GLUCOMTR-MCNC: 151 MG/DL — HIGH (ref 70–99)
GLUCOSE BLDC GLUCOMTR-MCNC: 159 MG/DL — HIGH (ref 70–99)
GLUCOSE BLDC GLUCOMTR-MCNC: 159 MG/DL — HIGH (ref 70–99)
GLUCOSE BLDC GLUCOMTR-MCNC: 168 MG/DL — HIGH (ref 70–99)
GLUCOSE BLDC GLUCOMTR-MCNC: 168 MG/DL — HIGH (ref 70–99)
GLUCOSE BLDC GLUCOMTR-MCNC: 181 MG/DL — HIGH (ref 70–99)
GLUCOSE BLDC GLUCOMTR-MCNC: 181 MG/DL — HIGH (ref 70–99)
GLUCOSE BLDC GLUCOMTR-MCNC: 237 MG/DL — HIGH (ref 70–99)
GLUCOSE BLDC GLUCOMTR-MCNC: 237 MG/DL — HIGH (ref 70–99)
GLUCOSE UR QL: NEGATIVE MG/DL — SIGNIFICANT CHANGE UP
GLUCOSE UR QL: NEGATIVE MG/DL — SIGNIFICANT CHANGE UP
KETONES UR-MCNC: NEGATIVE MG/DL — SIGNIFICANT CHANGE UP
KETONES UR-MCNC: NEGATIVE MG/DL — SIGNIFICANT CHANGE UP
LEUKOCYTE ESTERASE UR-ACNC: NEGATIVE — SIGNIFICANT CHANGE UP
LEUKOCYTE ESTERASE UR-ACNC: NEGATIVE — SIGNIFICANT CHANGE UP
NITRITE UR-MCNC: NEGATIVE — SIGNIFICANT CHANGE UP
NITRITE UR-MCNC: NEGATIVE — SIGNIFICANT CHANGE UP
PH UR: 6 — SIGNIFICANT CHANGE UP (ref 5–8)
PH UR: 6 — SIGNIFICANT CHANGE UP (ref 5–8)
PROT UR-MCNC: NEGATIVE MG/DL — SIGNIFICANT CHANGE UP
PROT UR-MCNC: NEGATIVE MG/DL — SIGNIFICANT CHANGE UP
SP GR SPEC: 1.02 — SIGNIFICANT CHANGE UP (ref 1–1.03)
SP GR SPEC: 1.02 — SIGNIFICANT CHANGE UP (ref 1–1.03)
SPECIMEN SOURCE: SIGNIFICANT CHANGE UP
SPECIMEN SOURCE: SIGNIFICANT CHANGE UP
UROBILINOGEN FLD QL: 0.2 MG/DL — SIGNIFICANT CHANGE UP (ref 0.2–1)
UROBILINOGEN FLD QL: 0.2 MG/DL — SIGNIFICANT CHANGE UP (ref 0.2–1)

## 2023-12-19 PROCEDURE — 71045 X-RAY EXAM CHEST 1 VIEW: CPT | Mod: 26

## 2023-12-19 PROCEDURE — 99232 SBSQ HOSP IP/OBS MODERATE 35: CPT

## 2023-12-19 PROCEDURE — 99233 SBSQ HOSP IP/OBS HIGH 50: CPT

## 2023-12-19 RX ORDER — VANCOMYCIN HCL 1 G
1000 VIAL (EA) INTRAVENOUS ONCE
Refills: 0 | Status: COMPLETED | OUTPATIENT
Start: 2023-12-19 | End: 2023-12-19

## 2023-12-19 RX ORDER — CEFEPIME 1 G/1
1000 INJECTION, POWDER, FOR SOLUTION INTRAMUSCULAR; INTRAVENOUS ONCE
Refills: 0 | Status: COMPLETED | OUTPATIENT
Start: 2023-12-19 | End: 2023-12-19

## 2023-12-19 RX ORDER — CEFEPIME 1 G/1
1000 INJECTION, POWDER, FOR SOLUTION INTRAMUSCULAR; INTRAVENOUS EVERY 8 HOURS
Refills: 0 | Status: DISCONTINUED | OUTPATIENT
Start: 2023-12-20 | End: 2023-12-20

## 2023-12-19 RX ORDER — CEFEPIME 1 G/1
INJECTION, POWDER, FOR SOLUTION INTRAMUSCULAR; INTRAVENOUS
Refills: 0 | Status: DISCONTINUED | OUTPATIENT
Start: 2023-12-19 | End: 2023-12-20

## 2023-12-19 RX ADMIN — Medication 1 TABLET(S): at 17:52

## 2023-12-19 RX ADMIN — Medication 1 APPLICATION(S): at 17:54

## 2023-12-19 RX ADMIN — Medication 1 APPLICATION(S): at 23:17

## 2023-12-19 RX ADMIN — Medication 1 APPLICATION(S): at 06:29

## 2023-12-19 RX ADMIN — Medication 15 MILLILITER(S): at 12:17

## 2023-12-19 RX ADMIN — SIMETHICONE 80 MILLIGRAM(S): 80 TABLET, CHEWABLE ORAL at 00:42

## 2023-12-19 RX ADMIN — INSULIN HUMAN 9 UNIT(S): 100 INJECTION, SOLUTION SUBCUTANEOUS at 17:55

## 2023-12-19 RX ADMIN — AER TRAVELER 1 APPLICATION(S): 0.5 SOLUTION RECTAL; TOPICAL at 06:27

## 2023-12-19 RX ADMIN — NYSTATIN CREAM 1 APPLICATION(S): 100000 CREAM TOPICAL at 14:00

## 2023-12-19 RX ADMIN — Medication 3 MILLILITER(S): at 11:27

## 2023-12-19 RX ADMIN — Medication 3 MILLILITER(S): at 18:00

## 2023-12-19 RX ADMIN — Medication 1: at 00:43

## 2023-12-19 RX ADMIN — Medication 5 MILLIGRAM(S): at 21:16

## 2023-12-19 RX ADMIN — INSULIN GLARGINE 18 UNIT(S): 100 INJECTION, SOLUTION SUBCUTANEOUS at 08:52

## 2023-12-19 RX ADMIN — ZINC OXIDE 1 APPLICATION(S): 200 OINTMENT TOPICAL at 17:54

## 2023-12-19 RX ADMIN — Medication 200 MILLIGRAM(S): at 23:17

## 2023-12-19 RX ADMIN — SIMETHICONE 80 MILLIGRAM(S): 80 TABLET, CHEWABLE ORAL at 23:16

## 2023-12-19 RX ADMIN — PANTOPRAZOLE SODIUM 40 MILLIGRAM(S): 20 TABLET, DELAYED RELEASE ORAL at 08:49

## 2023-12-19 RX ADMIN — Medication 1: at 06:28

## 2023-12-19 RX ADMIN — PHENYLEPHRINE-SHARK LIVER OIL-MINERAL OIL-PETROLATUM RECTAL OINTMENT 1 APPLICATION(S): at 14:29

## 2023-12-19 RX ADMIN — NYSTATIN CREAM 1 APPLICATION(S): 100000 CREAM TOPICAL at 21:16

## 2023-12-19 RX ADMIN — QUETIAPINE FUMARATE 12.5 MILLIGRAM(S): 200 TABLET, FILM COATED ORAL at 17:52

## 2023-12-19 RX ADMIN — AER TRAVELER 1 APPLICATION(S): 0.5 SOLUTION RECTAL; TOPICAL at 00:42

## 2023-12-19 RX ADMIN — Medication 1 MILLIGRAM(S): at 21:16

## 2023-12-19 RX ADMIN — Medication 1: at 12:18

## 2023-12-19 RX ADMIN — AER TRAVELER 1 APPLICATION(S): 0.5 SOLUTION RECTAL; TOPICAL at 12:23

## 2023-12-19 RX ADMIN — LIDOCAINE 1 PATCH: 4 CREAM TOPICAL at 00:30

## 2023-12-19 RX ADMIN — LIDOCAINE 5 MILLILITER(S): 4 CREAM TOPICAL at 06:29

## 2023-12-19 RX ADMIN — NYSTATIN CREAM 1 APPLICATION(S): 100000 CREAM TOPICAL at 06:27

## 2023-12-19 RX ADMIN — ESCITALOPRAM OXALATE 10 MILLIGRAM(S): 10 TABLET, FILM COATED ORAL at 08:49

## 2023-12-19 RX ADMIN — Medication 200 MILLIGRAM(S): at 17:56

## 2023-12-19 RX ADMIN — SIMETHICONE 80 MILLIGRAM(S): 80 TABLET, CHEWABLE ORAL at 12:17

## 2023-12-19 RX ADMIN — CHLORHEXIDINE GLUCONATE 15 MILLILITER(S): 213 SOLUTION TOPICAL at 06:29

## 2023-12-19 RX ADMIN — INSULIN HUMAN 11 UNIT(S): 100 INJECTION, SOLUTION SUBCUTANEOUS at 12:18

## 2023-12-19 RX ADMIN — Medication 1000 MILLIGRAM(S): at 22:08

## 2023-12-19 RX ADMIN — INSULIN HUMAN 24 UNIT(S): 100 INJECTION, SOLUTION SUBCUTANEOUS at 06:28

## 2023-12-19 RX ADMIN — Medication 125 MICROGRAM(S): at 05:25

## 2023-12-19 RX ADMIN — LIDOCAINE 1 PATCH: 4 CREAM TOPICAL at 12:23

## 2023-12-19 RX ADMIN — Medication 5 MILLIGRAM(S): at 06:27

## 2023-12-19 RX ADMIN — CHLORHEXIDINE GLUCONATE 1 APPLICATION(S): 213 SOLUTION TOPICAL at 06:28

## 2023-12-19 RX ADMIN — Medication 2 DROP(S): at 12:22

## 2023-12-19 RX ADMIN — LIDOCAINE 1 PATCH: 4 CREAM TOPICAL at 21:00

## 2023-12-19 RX ADMIN — CEFEPIME 100 MILLIGRAM(S): 1 INJECTION, POWDER, FOR SOLUTION INTRAMUSCULAR; INTRAVENOUS at 23:17

## 2023-12-19 RX ADMIN — Medication 1 APPLICATION(S): at 12:23

## 2023-12-19 RX ADMIN — GABAPENTIN 100 MILLIGRAM(S): 400 CAPSULE ORAL at 21:15

## 2023-12-19 RX ADMIN — Medication 2 DROP(S): at 06:28

## 2023-12-19 RX ADMIN — Medication 1 TABLET(S): at 06:29

## 2023-12-19 RX ADMIN — AMLODIPINE BESYLATE 10 MILLIGRAM(S): 2.5 TABLET ORAL at 06:29

## 2023-12-19 RX ADMIN — Medication 2 DROP(S): at 17:57

## 2023-12-19 RX ADMIN — LIDOCAINE 1 PATCH: 4 CREAM TOPICAL at 23:56

## 2023-12-19 RX ADMIN — AER TRAVELER 1 APPLICATION(S): 0.5 SOLUTION RECTAL; TOPICAL at 23:18

## 2023-12-19 RX ADMIN — Medication 200 MILLIGRAM(S): at 06:29

## 2023-12-19 RX ADMIN — Medication 3 MILLILITER(S): at 05:36

## 2023-12-19 RX ADMIN — SIMETHICONE 80 MILLIGRAM(S): 80 TABLET, CHEWABLE ORAL at 17:53

## 2023-12-19 RX ADMIN — SIMETHICONE 80 MILLIGRAM(S): 80 TABLET, CHEWABLE ORAL at 06:29

## 2023-12-19 RX ADMIN — Medication 200 MILLIGRAM(S): at 00:43

## 2023-12-19 RX ADMIN — LIDOCAINE 5 MILLILITER(S): 4 CREAM TOPICAL at 17:57

## 2023-12-19 RX ADMIN — Medication 1: at 17:55

## 2023-12-19 RX ADMIN — Medication 250 MILLIGRAM(S): at 23:37

## 2023-12-19 RX ADMIN — AER TRAVELER 1 APPLICATION(S): 0.5 SOLUTION RECTAL; TOPICAL at 17:56

## 2023-12-19 RX ADMIN — Medication 500 MILLIGRAM(S): at 12:23

## 2023-12-19 RX ADMIN — Medication 200 MILLIGRAM(S): at 12:17

## 2023-12-19 RX ADMIN — CHLORHEXIDINE GLUCONATE 15 MILLILITER(S): 213 SOLUTION TOPICAL at 17:56

## 2023-12-19 RX ADMIN — Medication 2: at 23:52

## 2023-12-19 RX ADMIN — LIDOCAINE 1 PATCH: 4 CREAM TOPICAL at 06:30

## 2023-12-19 RX ADMIN — Medication 1 APPLICATION(S): at 00:42

## 2023-12-19 RX ADMIN — Medication 2 DROP(S): at 00:42

## 2023-12-19 RX ADMIN — Medication 2 DROP(S): at 23:18

## 2023-12-19 NOTE — PROGRESS NOTE ADULT - SUBJECTIVE AND OBJECTIVE BOX
Chief Complaint:  Patient is a 73y old  Female who presents with a chief complaint of bleeding (04 Dec 2023 16:57)      Interval Events:     tolerating tube feeds          Hospital Medications:  acetaminophen   Oral Liquid .. 1000 milliGRAM(s) Enteral Tube every 6 hours PRN  albuterol/ipratropium for Nebulization 3 milliLiter(s) Nebulizer every 6 hours  amLODIPine   Tablet 10 milliGRAM(s) Oral daily  artificial  tears Solution 2 Drop(s) Both EYES four times a day  ascorbic acid 500 milliGRAM(s) Oral daily  benzocaine 20% Spray 1 Spray(s) Topical four times a day PRN  calcium carbonate    500 mG (Tums) Chewable 1 Tablet(s) Chew every 12 hours  chlorhexidine 0.12% Liquid 15 milliLiter(s) Oral Mucosa every 12 hours  chlorhexidine 4% Liquid 1 Application(s) Topical <User Schedule>  dextrose 50% Injectable 12.5 Gram(s) IV Push once  dextrose 50% Injectable 25 Gram(s) IV Push once  dextrose 50% Injectable 50 milliLiter(s) IV Push once  diclofenac sodium 1% Gel 2 Gram(s) Topical four times a day PRN  diphenhydrAMINE Elixir 25 milliGRAM(s) Oral every 6 hours PRN  escitalopram 10 milliGRAM(s) Oral <User Schedule>  fentaNYL   Patch  25 MICROgram(s)/Hr 1 Patch Transdermal every 72 hours  gabapentin Solution 100 milliGRAM(s) Enteral Tube at bedtime  glucagon  Injectable 1 milliGRAM(s) IntraMuscular once  guaiFENesin Oral Liquid (Sugar-Free) 200 milliGRAM(s) Oral every 6 hours  guaifenesin/dextromethorphan Oral Liquid 10 milliLiter(s) Oral every 6 hours PRN  hemorrhoidal Ointment 1 Application(s) Rectal daily  hemorrhoidal Ointment      insulin glargine Injectable (LANTUS) 18 Unit(s) SubCutaneous every morning  insulin lispro (ADMELOG) corrective regimen sliding scale   SubCutaneous every 6 hours  insulin regular  human recombinant 9 Unit(s) SubCutaneous <User Schedule>  insulin regular  human recombinant 11 Unit(s) SubCutaneous <User Schedule>  insulin regular  human recombinant 24 Unit(s) SubCutaneous <User Schedule>  levothyroxine 125 MICROGram(s) Oral <User Schedule>  lidocaine   4% Patch 1 Patch Transdermal daily  lidocaine 2% Viscous 5 milliLiter(s) Mucosal two times a day  melatonin 5 milliGRAM(s) Oral at bedtime  melatonin 1 milliGRAM(s) Oral at bedtime  multivitamin/minerals/iron Oral Solution (CENTRUM) 15 milliLiter(s) Oral daily  nystatin Powder 1 Application(s) Topical every 8 hours  oxyCODONE    Solution 2.5 milliGRAM(s) Oral every 6 hours PRN  pantoprazole   Suspension 40 milliGRAM(s) Enteral Tube <User Schedule>  petrolatum Ophthalmic Ointment 1 Application(s) Both EYES four times a day  predniSONE   Tablet 5 milliGRAM(s) Oral daily  QUEtiapine 12.5 milliGRAM(s) Oral <User Schedule>  simethicone 80 milliGRAM(s) Chew four times a day  sodium chloride 0.65% Nasal 1 Spray(s) Both Nostrils every 6 hours PRN  tranexamic acid Injectable for Nebulization 250 milliGRAM(s) Inhalation every 8 hours  witch hazel Pads 1 Application(s) Topical four times a day  zinc oxide 40% Paste 1 Application(s) Topical daily        Review of Systems:  General:  No wt loss, fevers, chills, night sweats, fatigue,   Eyes:  Good vision, no reported pain  ENT:  No sore throat, pain, runny nose, dysphagia  CV:  No pain, palpitations, hypo/hypertension  Resp:  No dyspnea, cough, tachypnea, wheezing  GI:  See HPI  :  No pain, bleeding, incontinence, nocturia  Muscle:  No pain, weakness  Neuro:  No weakness, tingling, memory problems  Psych:  No fatigue, insomnia, mood problems, depression  Endocrine:  No polyuria, polydipsia, cold/heat intolerance  Heme:  No petechiae, ecchymosis, easy bruisability  Integumentary:  No rash, edema      Vital Signs Last 24 Hrs  T(C): 36.2 (19 Dec 2023 06:25), Max: 37.3 (18 Dec 2023 14:00)  T(F): 97.1 (19 Dec 2023 06:25), Max: 99.2 (18 Dec 2023 14:00)  HR: 90 (19 Dec 2023 09:38) (82 - 104)  BP: 125/67 (19 Dec 2023 06:25) (118/66 - 144/73)  BP(mean): 94 (18 Dec 2023 16:40) (94 - 94)  RR: 15 (19 Dec 2023 09:37) (14 - 18)  SpO2: 100% (19 Dec 2023 09:38) (99% - 100%)    Parameters below as of 19 Dec 2023 09:37  Patient On (Oxygen Delivery Method): ventilator      PHYSICAL EXAM:     GENERAL:  Appears stated age, well-groomed, well-nourished, no distress  HEENT:  NC/AT,  conjunctivae anicteric, clear and pink,   NECK: supple, trachea midline  +trach  CHEST:  Full & symmetric excursion, no increased effort, breath sounds clear  HEART:  Regular rhythm, no JVD  ABDOMEN:  Soft, non-tender, non-distended, normoactive bowel sounds,  no masses , no hepatosplenomegaly  +peg  EXTREMITIES:  no cyanosis, clubbing or edema  SKIN:  No rash, erythema, or, ecchymoses, no jaundice  NEURO:  Alert, non-focal, no asterixis  PSYCH: Appropriate affect, oriented to place and time  RECTAL: Deferred      LABS Personally reviewed by me:                        9.3    7.95  )-----------( 117      ( 18 Dec 2023 11:34 )             31.3     Mean Cell Volume: 92.6 fl (12-18-23 @ 11:34)    12-18    137  |  98  |  18  ----------------------------<  153<H>  4.0   |  29  |  <0.30<L>    Ca    9.5      18 Dec 2023 11:34  Phos  4.2     12-18  Mg     1.6     12-18          Urinalysis Basic - ( 18 Dec 2023 11:34 )    Color: x / Appearance: x / SG: x / pH: x  Gluc: 153 mg/dL / Ketone: x  / Bili: x / Urobili: x   Blood: x / Protein: x / Nitrite: x   Leuk Esterase: x / RBC: x / WBC x   Sq Epi: x / Non Sq Epi: x / Bacteria: x                              9.3    7.95  )-----------( 117      ( 18 Dec 2023 11:34 )             31.3       Imaging personally reviewed by me:           Addended by: Gianluca Longo on: 7/21/2020 04:55 PM     Modules accepted: Orders

## 2023-12-19 NOTE — PROGRESS NOTE ADULT - NS ATTEND AMEND GEN_ALL_CORE FT
73F with a h/o DM, HTN, HLD, anemia, hypothyroidism, RA, fibromyalgia, remote cerebral aneurysm with repair, hypercapnic respiratory failure s/p tracheostomy/PEG, bedbound, sacral pressure ulcer. Admitted w/ bleeding from tracheostomy and PEG w/ associated abdominal pain, recent hx of auditory/visual hallucinations.   Imaging showed mild thickening along PEG tube tract and sacral ulcer extending to coccyx suggestive of acute osteomyelitis.      # Osteomyelitis  # Chronic hypoxemic RF  # oropharyngeal dysphagia  # acute on chronic pain  # Trach/Peg bleeding  # Tracheitis with Pseudomonas and serratia  # Hx of hallucination, anxiety     #Neuro - awake, alert, and interactive. moving all extremities, mouthing words. continue current medications  #Cardiovascular - hemodynamically stable  #Pulmonary - chronic hypoxemic respiratory failure, has 8XLT trach, on home vent, pressure support trials daily 15/5  - noted to have blood mixed with sputum upon suctioning. FiO2 requirements remain low - 30%FiO2 saturating well, d/marli hypertonic saline and has  improved with TXA nebs. Minimize suctioning as needed  #Gastro - oropharyngeal dysphagia with PEG tube in place, tolerating feeds, nutrition follow up appreciated   Abdominal sono 12/1 and CT abdomen 12/4- No acute intra-abdominal findings.   Appreciate GI eval of PEG site - pending final plan regarding intermittent leaking from stoma.    #ID - sacral osteo on MRI - wound care follow-up appreciated, c/w offloading and local wound care  -The patient has had this wound since a hospitalization in December 2022 and per daughter does not have chronic or multiple wounds but only this single wound.   - s/p 6 week course of Cipro and Keflex as per ID - completed 12/10/23  - Per daughter, patient has had prior multiple infections during hospitalizations including prior pseudomonas, MRSA, E faecalis, and Klebsiella  - Sputum colonized with MDR Pseudomonas   - Daughter reported a recent dental abscess and being told by outpatient dentist that patient needed teeth extracted - Dental evaluation noted with no plans for intervention as inpatient.   12/14 - febrile to 100.8, infectious work up negative, likely 2/2 atelectasis  #Hem/Onc - prior cytopenias in setting of antibiotics per patient's daughter   - Hem/Onc follow-up noted - suspect an anemia of chronic disease  #Pain - continue current pain control - in setting of fibromyalgia and pain from wound  - Voltaren topical, lidocaine patches and low dose Oxycodone as needed  #Derm - previously seen by derm for skin lesions - mid back with irritated seborrheic keratosis - outpatient follow-up  #Endo - DM2 - continue insulin and adjust as needed for goal FS < 180  - Endocrine follow-up appreciated   - Continue Synthroid - TSH WNL  #DVT proph - SCDs  - Prophylactic AC stopped after concern for prior bleeding.  Discharge planning. Above was d/w the patient's daughter Marysol at length at bedside

## 2023-12-19 NOTE — CHART NOTE - NSCHARTNOTEFT_GEN_A_CORE
MEDICINE PA NOTE    Event Summary:   Notified by RN patient with fever, Temp- 102F. Pt. seen and examined at bedside, in NAD.  Endorses c/o nasal congestion.  Otherwise, denies c/o CP, SOB, palpitations, HA, cough, diaphoresis, chills, N/V/D, abd pain or dysuria.    >VITAL SIGNS:  T(C): 38.9 (12-19-23 @ 21:21), Max: 38.9 (12-19-23 @ 21:21)  T(F): 102 (12-19-23 @ 21:21), Max: 102 (12-19-23 @ 21:21)  HR: 111 (12-19-23 @ 21:21) (82 - 111)  BP: 138/90 (12-19-23 @ 21:21) (106/62 - 138/90)  RR: 26 (12-19-23 @ 21:21) (14 - 26)  SpO2: 99% (12-19-23 @ 21:21) (97% - 100%)          >LABORATORY:                        9.3    7.95  )-----------( 117      ( 18 Dec 2023 11:34 )             31.3       12-18    137  |  98  |  18  ----------------------------<  153<H>  4.0   |  29  |  <0.30<L>    Ca    9.5      18 Dec 2023 11:34  Phos  4.2     12-18  Mg     1.6     12-18        >MICROBIOLOGY:     >RADIOLOGY:    >PHYSICAL EXAM:  GENERAL: NAD  HEAD:  Atraumatic, normocephalic  EYES: EOMI, PERRLA, conjunctiva and sclera clear  NECK: Supple, No JVD, Normal thyroid  NERVOUS SYSTEM:  AOX3, Good concentration; Motor Strength 5/5 B/L upper and lower extremities; DTRs 2+ intact and symmetric  CHEST/LUNG: Clear to auscultation bilaterally; full respirations. No rales, rhonchi, wheezing, or cough  HEART: Regular rate and rhythm; No murmurs  ABDOMEN: Soft, Nontender, Nondistended; Bowel sounds present  EXTREMITIES:  2+ Peripheral Pulses, No clubbing, cyanosis, or edema    ASSESSMENT/PLAN:   HPI:  72 y/o F with PMHx of T2DM, HTN, HLD, anemia, hypothyroidism, RA, fibromyalgia, remote cerebral aneurysm repair, acute hypercapnic respiratory failure now with tracheostomy, PEG tube, and bedbound (trach change 8/18, PEG change 8/14), sacral pressure ulcer, BIBEMS from home for 6 day hx of bleeding from the tracheostomy and PEG tube with associated abdominal pain. Patient still having regular bowel movements, last one occurred prior to ED arrival. Was seen outpatient on 10/26 by critical care Dr. Zaki Gottlieb and apparently had cultures sent at that time. Patient also noted to have chronic sacral decubitous ulcer. Family endorsed one episode of fever, though was not febrile on evaluation. Daughter noted patient was recently experiencing auditory and visual hallucinations (seeing animals that were not there & hearing a "bomb" going off outside her home), though patient not actively hallucinating on evaluation.    ED course notable for CT neck imaging showing no evidence of active bleeding around the tracheostomy site, no hematomas, and no drainable collection around the tracheostomy site. CT abdomen/pelvis notable for gastrostomy tube localized to the stomach with mild thickening noted along the tract; a sacral decubitus ulcer extending to the coccyx with osseous remodeling, possibly representing osteomyelitis; and bibasilar patchy opacities with volume loss in the lungs, representing atelectasis vs infection. Patient admitted for respiratory support, wound care of tracheostomy and PEG, and management of presumed sacral osteomyelitis.   (28 Oct 2023 04:18)      # Fever  > Ordered Tylenol and cooling measures PRN for pyrexia  > Ordered BC x2, urine cx   > Ordered CXR  > F/u am labs   > C/w abx/IVF  > ID following  > Monitor vital signs     Will endorse to primary team in am; attending to follow.    Patient is now afebrile following Tylenol administration. MEDICINE PA NOTE    Event Summary:   Notified by RN patient with fever, Temp- 102F. Pt. seen and examined at bedside, in NAD.  Endorses c/o nasal congestion.  Otherwise, denies c/o CP, SOB, palpitations, HA, cough, diaphoresis, chills, N/V/D, abd pain or dysuria.    >VITAL SIGNS:  T(C): 38.9 (12-19-23 @ 21:21), Max: 38.9 (12-19-23 @ 21:21)  T(F): 102 (12-19-23 @ 21:21), Max: 102 (12-19-23 @ 21:21)  HR: 111 (12-19-23 @ 21:21) (82 - 111)  BP: 138/90 (12-19-23 @ 21:21) (106/62 - 138/90)  RR: 26 (12-19-23 @ 21:21) (14 - 26)  SpO2: 99% (12-19-23 @ 21:21) (97% - 100%)          >LABORATORY:                        9.3    7.95  )-----------( 117      ( 18 Dec 2023 11:34 )             31.3       12-18    137  |  98  |  18  ----------------------------<  153<H>  4.0   |  29  |  <0.30<L>    Ca    9.5      18 Dec 2023 11:34  Phos  4.2     12-18  Mg     1.6     12-18        >MICROBIOLOGY:     >RADIOLOGY:    >PHYSICAL EXAM:  GENERAL: NAD  HEAD:  Atraumatic, normocephalic  EYES: EOMI, PERRLA, conjunctiva and sclera clear  NECK: Supple, No JVD, Normal thyroid  NERVOUS SYSTEM:  AOX3, Good concentration; Motor Strength 5/5 B/L upper and lower extremities; DTRs 2+ intact and symmetric  CHEST/LUNG: Clear to auscultation bilaterally; full respirations. No rales, rhonchi, wheezing, or cough  HEART: Regular rate and rhythm; No murmurs  ABDOMEN: Soft, Nontender, Nondistended; Bowel sounds present  EXTREMITIES:  2+ Peripheral Pulses, No clubbing, cyanosis, or edema    ASSESSMENT/PLAN:   HPI:  74 y/o F with PMHx of T2DM, HTN, HLD, anemia, hypothyroidism, RA, fibromyalgia, remote cerebral aneurysm repair, acute hypercapnic respiratory failure now with tracheostomy, PEG tube, and bedbound (trach change 8/18, PEG change 8/14), sacral pressure ulcer, BIBEMS from home for 6 day hx of bleeding from the tracheostomy and PEG tube with associated abdominal pain. Patient still having regular bowel movements, last one occurred prior to ED arrival. Was seen outpatient on 10/26 by critical care Dr. Zaki Gottlieb and apparently had cultures sent at that time. Patient also noted to have chronic sacral decubitous ulcer. Family endorsed one episode of fever, though was not febrile on evaluation. Daughter noted patient was recently experiencing auditory and visual hallucinations (seeing animals that were not there & hearing a "bomb" going off outside her home), though patient not actively hallucinating on evaluation.    ED course notable for CT neck imaging showing no evidence of active bleeding around the tracheostomy site, no hematomas, and no drainable collection around the tracheostomy site. CT abdomen/pelvis notable for gastrostomy tube localized to the stomach with mild thickening noted along the tract; a sacral decubitus ulcer extending to the coccyx with osseous remodeling, possibly representing osteomyelitis; and bibasilar patchy opacities with volume loss in the lungs, representing atelectasis vs infection. Patient admitted for respiratory support, wound care of tracheostomy and PEG, and management of presumed sacral osteomyelitis.   (28 Oct 2023 04:18)      # Fever  > Ordered Tylenol and cooling measures PRN for pyrexia  > Ordered BC x2, urine cx   > Ordered CXR  > F/u am labs   > C/w abx/IVF  > ID following  > Monitor vital signs     Will endorse to primary team in am; attending to follow.    Patient is now afebrile following Tylenol administration. MEDICINE PA NOTE    Event Summary:   Notified by RN patient with fever, Temp- 102F. Pt. seen and examined at bedside, in NAD. Unable to communicate; per pt's family at bedside - no acute changes..    >VITAL SIGNS:  T(C): 38.9 (12-19-23 @ 21:21), Max: 38.9 (12-19-23 @ 21:21)  T(F): 102 (12-19-23 @ 21:21), Max: 102 (12-19-23 @ 21:21)  HR: 111 (12-19-23 @ 21:21) (82 - 111)  BP: 138/90 (12-19-23 @ 21:21) (106/62 - 138/90)  RR: 26 (12-19-23 @ 21:21) (14 - 26)  SpO2: 99% (12-19-23 @ 21:21) (97% - 100%)      >LABORATORY:                        9.3    7.95  )-----------( 117      ( 18 Dec 2023 11:34 )             31.3       12-18    137  |  98  |  18  ----------------------------<  153<H>  4.0   |  29  |  <0.30<L>    Ca    9.5      18 Dec 2023 11:34  Phos  4.2     12-18  Mg     1.6     12-18      >PHYSICAL EXAM:  GENERAL: NAD  HEAD:  Atraumatic, normocephalic  NECK: Supple, No JVD, Normal thyroid  NERVOUS SYSTEM:  AxO 0-1, per baseline.  CHEST/LUNG: Clear to auscultation bilaterally; full respirations. No rales, rhonchi, wheezing, or cough  HEART: Regular rate and rhythm; No murmurs  ABDOMEN: Soft, Nontender, Nondistended; Bowel sounds present    ASSESSMENT/PLAN:   HPI:  74 y/o F with PMHx of T2DM, HTN, HLD, anemia, hypothyroidism, RA, fibromyalgia, remote cerebral aneurysm repair, acute hypercapnic respiratory failure now with tracheostomy, PEG tube, and bedbound (trach change 8/18, PEG change 8/14), sacral pressure ulcer, BIBEMS from home for 6 day hx of bleeding from the tracheostomy and PEG tube with associated abdominal pain. Patient still having regular bowel movements, last one occurred prior to ED arrival. Was seen outpatient on 10/26 by critical care Dr. Zaki Gottlieb and apparently had cultures sent at that time. Patient also noted to have chronic sacral decubitous ulcer. Family endorsed one episode of fever, though was not febrile on evaluation. Daughter noted patient was recently experiencing auditory and visual hallucinations (seeing animals that were not there & hearing a "bomb" going off outside her home), though patient not actively hallucinating on evaluation.    ED course notable for CT neck imaging showing no evidence of active bleeding around the tracheostomy site, no hematomas, and no drainable collection around the tracheostomy site. CT abdomen/pelvis notable for gastrostomy tube localized to the stomach with mild thickening noted along the tract; a sacral decubitus ulcer extending to the coccyx with osseous remodeling, possibly representing osteomyelitis; and bibasilar patchy opacities with volume loss in the lungs, representing atelectasis vs infection. Patient admitted for respiratory support, wound care of tracheostomy and PEG, and management of presumed sacral osteomyelitis.   (28 Oct 2023 04:18)      # Fever  > Gave Tylenol as ordered   > Ordered BC x2, UA, urine cx, sputum cx   > Ordered CXR  > F/u am labs   > D/w ID overnight Dr. Cesilia Vincent - rec vancomycin 1g IV x1 and starting empiric therapy on cefepime 1g q8h; ordered  > Monitor vital signs   > Continue to closely monitor pt's clinical presentation     Will endorse to primary team in am; attending to follow.    Patient is now afebrile following Tylenol administration.    Georges Xie PA-C  Medicine  [id number] MEDICINE PA NOTE    Event Summary:   Notified by RN patient with fever, Temp- 102F. Pt. seen and examined at bedside, in NAD. Unable to communicate; per pt's family at bedside - no acute changes..    >VITAL SIGNS:  T(C): 38.9 (12-19-23 @ 21:21), Max: 38.9 (12-19-23 @ 21:21)  T(F): 102 (12-19-23 @ 21:21), Max: 102 (12-19-23 @ 21:21)  HR: 111 (12-19-23 @ 21:21) (82 - 111)  BP: 138/90 (12-19-23 @ 21:21) (106/62 - 138/90)  RR: 26 (12-19-23 @ 21:21) (14 - 26)  SpO2: 99% (12-19-23 @ 21:21) (97% - 100%)      >LABORATORY:                        9.3    7.95  )-----------( 117      ( 18 Dec 2023 11:34 )             31.3       12-18    137  |  98  |  18  ----------------------------<  153<H>  4.0   |  29  |  <0.30<L>    Ca    9.5      18 Dec 2023 11:34  Phos  4.2     12-18  Mg     1.6     12-18      >PHYSICAL EXAM:  GENERAL: NAD  HEAD:  Atraumatic, normocephalic  NECK: Supple, No JVD, Normal thyroid  NERVOUS SYSTEM:  AxO 0-1, per baseline.  CHEST/LUNG: Clear to auscultation bilaterally; full respirations. No rales, rhonchi, wheezing, or cough  HEART: Regular rate and rhythm; No murmurs  ABDOMEN: Soft, Nontender, Nondistended; Bowel sounds present    ASSESSMENT/PLAN:   HPI:  72 y/o F with PMHx of T2DM, HTN, HLD, anemia, hypothyroidism, RA, fibromyalgia, remote cerebral aneurysm repair, acute hypercapnic respiratory failure now with tracheostomy, PEG tube, and bedbound (trach change 8/18, PEG change 8/14), sacral pressure ulcer, BIBEMS from home for 6 day hx of bleeding from the tracheostomy and PEG tube with associated abdominal pain. Patient still having regular bowel movements, last one occurred prior to ED arrival. Was seen outpatient on 10/26 by critical care Dr. Zaki Gottlieb and apparently had cultures sent at that time. Patient also noted to have chronic sacral decubitous ulcer. Family endorsed one episode of fever, though was not febrile on evaluation. Daughter noted patient was recently experiencing auditory and visual hallucinations (seeing animals that were not there & hearing a "bomb" going off outside her home), though patient not actively hallucinating on evaluation.    ED course notable for CT neck imaging showing no evidence of active bleeding around the tracheostomy site, no hematomas, and no drainable collection around the tracheostomy site. CT abdomen/pelvis notable for gastrostomy tube localized to the stomach with mild thickening noted along the tract; a sacral decubitus ulcer extending to the coccyx with osseous remodeling, possibly representing osteomyelitis; and bibasilar patchy opacities with volume loss in the lungs, representing atelectasis vs infection. Patient admitted for respiratory support, wound care of tracheostomy and PEG, and management of presumed sacral osteomyelitis.   (28 Oct 2023 04:18)      # Fever  > Gave Tylenol as ordered   > Ordered BC x2, UA, urine cx, sputum cx   > Ordered CXR  > F/u am labs   > D/w ID overnight Dr. Cesilia Vincent - rec vancomycin 1g IV x1 and starting empiric therapy on cefepime 1g q8h; ordered  > Monitor vital signs   > Continue to closely monitor pt's clinical presentation     Will endorse to primary team in am; attending to follow.    Patient is now afebrile following Tylenol administration.    Georges Xie PA-C  Medicine  [id number]

## 2023-12-19 NOTE — PROGRESS NOTE ADULT - PROBLEM SELECTOR PLAN 2
- Due to chronic steroid use pt is at risk of tertiary AI, please do not abruptly discontinue or hold steroids as this can result in an adrenal crisis   - c/w prednisone 5mg daily  - hemodynamically stable at present time

## 2023-12-19 NOTE — PROGRESS NOTE ADULT - PROBLEM SELECTOR PLAN 5
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   - PEG Site appears macerated --> Multifactorial, possible the PEG has been too tight at home  - Peg loosened by GI at beside, no leakage or further intervention required   - recurrent RLQ abdominal pain and bleeding at the PEG site  - reevaluated by GI -- no bleeding at the PEG site  - 12/1: s/p abdominal ultrasound - limited study   - (12/3): Pt is c/o abd pain, and daughter is concerned   - AM amylase and Lipase all wnl , CT A/P 12/3 - no acute findings  - Continue Simethicone  - 12/16 PEG tube leakage - GI recs appreciated

## 2023-12-19 NOTE — PROGRESS NOTE ADULT - PROBLEM SELECTOR PLAN 1
- FS BG monitoring [id number] while on TFs/NPO   - Continue lantus 18 units sc qAM   - Continue Regular insulin 24 units sc at 6am, 11 units sc at 12 noon and 9 units sc at 6pm. PLEASE DO NOT HOLD IF PATIENT IS ON TUBE FEEDS (hold only if TF are stopped). Can decrease/adjust dose if there is a concern for hypoglycemia.   - C/w low dose admelog correction scale every 6 hours  - Will follow and adjust insulin as needed  DISCHARGE:  - Will be determined according to BG/insulin needs/TFs and steroid therapy at time of discharge. If discharge within next 24 hours will continue with same insulin types and doses as noted above with the exception of Admelog correction scale.   - Needs telemedicine as outpatient due to bedbound status. Can follow up at [facility] practice. [address] Phone . Make sure pt has Rx for all DM supplies and insulin. - FS BG monitoring [id number] while on TFs/NPO   - Continue lantus 18 units sc qAM   - Continue Regular insulin 24 units sc at 6am, 11 units sc at 12 noon and 9 units sc at 6pm. PLEASE DO NOT HOLD IF PATIENT IS ON TUBE FEEDS (hold only if TF are stopped). Can decrease/adjust dose if there is a concern for hypoglycemia.   - C/w low dose admelog correction scale every 6 hours  - Will follow and adjust insulin as needed  DISCHARGE:  - Will be determined according to BG/insulin needs/TFs and steroid therapy at time of discharge. If discharge within next 24 hours will continue with same insulin types and doses as noted above with the exception of Admelog correction scale.   - Needs telemedicine as outpatient due to bedbound status. Can follow up at [facility] practice. [address]. Phone . Make sure pt has Rx for all DM supplies and insulin.

## 2023-12-19 NOTE — PROGRESS NOTE ADULT - ASSESSMENT
72 y/o F w/h/o T2DM with unknown control due to anemia of chronic disease skewing A1C levels. On Levemir and Humalog insulin PTA. Unknown DM complications. Also h/o HTN, HLD, anemia, hypothyroidism, RA, fibromyalgia, remote cerebral aneurysm repair, acute hypercapnic respiratory failure now with tracheostomy, PEG tube, and bedbound (trach change 8/18, PEG change 8/14), sacral pressure ulcer, BIBEMS from home for 6 day hx of bleeding from the tracheostomy and PEG tube with associated abdominal pain> now resolved. Endocrinology consulted for hyperglycemia. Tolerating TFs from 6am to 2am with BG levels at goal while present insulin regimen. No hypoglycemia so will continue with present insulin doses.

## 2023-12-19 NOTE — PROGRESS NOTE ADULT - ASSESSMENT
73 year old female with respiratory failure s/p trach and PEG, sacral osteomyelitis.     leakage from chronic PEG site. no signs of infection and site looks intact with some granulation tissue around the tube.  - Discussed with daughter at bedside, limited options available and I would avoid a repeat PEG.  - would trial topical silver nitrate to facilitate tract closure   - surgery consult for possible suture to the site   - decrease rate of tube feeds / administer TF and meds by gravity if feasible  - cont PPI      [facility]  Andrew Morgan M.D.   [address] [place] [address]  Office: [phone]  Cell: [phone]       73 year old female with respiratory failure s/p trach and PEG, sacral osteomyelitis.     leakage from chronic PEG site. no signs of infection and site looks intact with some granulation tissue around the tube.  - Discussed with daughter at bedside, limited options available and I would avoid a repeat PEG.  - would trial topical silver nitrate to facilitate tract closure   - surgery consult for possible suture to the site   - decrease rate of tube feeds / administer TF and meds by gravity if feasible  - cont PPI      [facility]  Andrew Morgan M.D.   [address] [place] [address]  Office: [phone]  Cell: [phone]

## 2023-12-19 NOTE — PROGRESS NOTE ADULT - NUTRITIONAL ASSESSMENT
Diet, NPO with Tube Feed:   Tube Feeding Modality: Gastrostomy  [employer] glucose support 1.2  Total Volume for 24 Hours (mL): 1200  Continuous  Starting Tube Feed Rate {mL per Hour}: 60  Increase Tube Feed Rate by (mL): 0  Until Goal Tube Feed Rate (mL per Hour): 60  Tube Feed Duration (in Hours): 20  Tube Feed Start Time: 06:00  Tube Feed Stop Time: 02:00  Free Water Flush  Pump   Rate (mL per Hour): 10   Frequency: Every 2 Hours  Alfred(7 Gm Arginine/7 Gm Glut/1.2 Gm HMB     Qty per Day:  2 (11-29-23)    EN order providing if 100% provision: 1440kcal (30kcal/kg) and 77g protein (1.6g/kg) Diet, NPO with Tube Feed:   Tube Feeding Modality: Gastrostomy  [employer] glucose support 1.2  Total Volume for 24 Hours (mL): 1200  Continuous  Starting Tube Feed Rate {mL per Hour}: 60  Increase Tube Feed Rate by (mL): 0  Until Goal Tube Feed Rate (mL per Hour): 60  Tube Feed Duration (in Hours): 20  Tube Feed Start Time: 06:00  Tube Feed Stop Time: 02:00  Free Water Flush  Pump   Rate (mL per Hour): 10   Frequency: Every 2 Hours  Alfred(7 Gm Arginine/7 Gm Glut/1.2 Gm HMB     Qty per Day:  2 (11-29-23)    EN order providing if 100% provision: 1440kcal (30kcal/kg) and 77g protein (1.6g/kg)

## 2023-12-19 NOTE — PROGRESS NOTE ADULT - PROBLEM SELECTOR PLAN 3
TSH and free thyroxine wnl 11/28/23  Recommendations:   - continue levothyroxine 125mcg daily  - Please make sure LT4 is given on an empty stomach at least 30 mins to 1 hour apart from other meds and food/TFs to ensure med absorption. DO NOT GIVE WITH VITAMINS/ANTACIDS

## 2023-12-19 NOTE — PROGRESS NOTE ADULT - NSPROGADDITIONALINFOA_GEN_ALL_CORE
-Plan discussed with team.  Contact info: [phone] (24/7). pager [phone]  Amion on [place]  Teams on M-T-W-F. Unavailable Thu/Weekends/Holidays  Assessed pt/labs/meds and discussed plan of care with primary team  Adjusting insulin  Discharge plan  Follow up care -Plan discussed with team.  Contact info: [phone] (24/7). pager [phone]  Amion on [place]  Teams on M-T-W-F. Unavailable Thu/Weekends/Holidays  Assessed pt/labs/meds and discussed plan of care with primary team  Adjusting insulin  Discharge plan  Follow up care

## 2023-12-19 NOTE — PROGRESS NOTE ADULT - SUBJECTIVE AND OBJECTIVE BOX
Patient is a 73y old  Female who presents with a chief complaint of bleeding (04 Dec 2023 16:57)      Interval Events:    REVIEW OF SYSTEMS:  [ ] Positive  [ ] All other systems negative  [ ] Unable to assess ROS because ________    Vital Signs Last 24 Hrs  T(C): 36.2 (12-19-23 @ 06:25), Max: 37.3 (12-18-23 @ 14:00)  T(F): 97.1 (12-19-23 @ 06:25), Max: 99.2 (12-18-23 @ 14:00)  HR: 84 (12-19-23 @ 06:25) (84 - 104)  BP: 125/67 (12-19-23 @ 06:25) (118/66 - 144/73)  RR: 14 (12-19-23 @ 06:25) (14 - 18)  SpO2: 100% (12-19-23 @ 06:25) (99% - 100%)    PHYSICAL EXAM:  HEENT:   [ ]Tracheostomy:  [ ]Pupils equal  [ ]No oral lesions  [ ]Abnormal    SKIN  [ ]No Rash  [ ] Abnormal  [ ] pressure    CARDIAC  [ ]Regular  [ ]Abnormal    PULMONARY  [ ]Bilateral Clear Breath Sounds  [ ]Normal Excursion  [ ]Abnormal    GI  [ ]PEG      [ ] +BS		              [ ]Soft, nondistended, nontender	  [ ]Abnormal    MUSCULOSKELETAL                                   [ ]Bedbound                 [ ]Abnormal    [ ]Ambulatory/OOB to chair                           EXTREMITIES                                         [ ]Normal  [ ]Edema                           NEUROLOGIC  [ ] Normal, non focal  [ ] Focal findings:    PSYCHIATRIC  [ ]Alert and appropriate  [ ] Sedated	 [ ]Agitated    :  Mcbride: [ ] Yes, if yes: Date of Placement:                   [  ] No    LINES: Central Lines [ ] Yes, if yes: Date of Placement                                     [  ] No    HOSPITAL MEDICATIONS:  MEDICATIONS  (STANDING):  albuterol/ipratropium for Nebulization 3 milliLiter(s) Nebulizer every 6 hours  amLODIPine   Tablet 10 milliGRAM(s) Oral daily  artificial  tears Solution 2 Drop(s) Both EYES four times a day  ascorbic acid 500 milliGRAM(s) Oral daily  calcium carbonate    500 mG (Tums) Chewable 1 Tablet(s) Chew every 12 hours  chlorhexidine 0.12% Liquid 15 milliLiter(s) Oral Mucosa every 12 hours  chlorhexidine 4% Liquid 1 Application(s) Topical <User Schedule>  dextrose 50% Injectable 12.5 Gram(s) IV Push once  dextrose 50% Injectable 25 Gram(s) IV Push once  dextrose 50% Injectable 50 milliLiter(s) IV Push once  escitalopram 10 milliGRAM(s) Oral <User Schedule>  fentaNYL   Patch  25 MICROgram(s)/Hr 1 Patch Transdermal every 72 hours  gabapentin Solution 100 milliGRAM(s) Enteral Tube at bedtime  glucagon  Injectable 1 milliGRAM(s) IntraMuscular once  guaiFENesin Oral Liquid (Sugar-Free) 200 milliGRAM(s) Oral every 6 hours  hemorrhoidal Ointment      hemorrhoidal Ointment 1 Application(s) Rectal daily  insulin glargine Injectable (LANTUS) 18 Unit(s) SubCutaneous every morning  insulin lispro (ADMELOG) corrective regimen sliding scale   SubCutaneous every 6 hours  insulin regular  human recombinant 11 Unit(s) SubCutaneous <User Schedule>  insulin regular  human recombinant 24 Unit(s) SubCutaneous <User Schedule>  insulin regular  human recombinant 9 Unit(s) SubCutaneous <User Schedule>  levothyroxine 125 MICROGram(s) Oral <User Schedule>  lidocaine   4% Patch 1 Patch Transdermal daily  lidocaine 2% Viscous 5 milliLiter(s) Mucosal two times a day  melatonin 5 milliGRAM(s) Oral at bedtime  melatonin 1 milliGRAM(s) Oral at bedtime  multivitamin/minerals/iron Oral Solution (CENTRUM) 15 milliLiter(s) Oral daily  nystatin Powder 1 Application(s) Topical every 8 hours  pantoprazole   Suspension 40 milliGRAM(s) Enteral Tube <User Schedule>  petrolatum Ophthalmic Ointment 1 Application(s) Both EYES four times a day  predniSONE   Tablet 5 milliGRAM(s) Oral daily  QUEtiapine 12.5 milliGRAM(s) Oral <User Schedule>  simethicone 80 milliGRAM(s) Chew four times a day  witch hazel Pads 1 Application(s) Topical four times a day  zinc oxide 40% Paste 1 Application(s) Topical daily    MEDICATIONS  (PRN):  acetaminophen   Oral Liquid .. 1000 milliGRAM(s) Enteral Tube every 6 hours PRN Temp greater or equal to 38C (100.4F), Mild Pain (1 - 3)  benzocaine 20% Spray 1 Spray(s) Topical four times a day PRN Cough  diclofenac sodium 1% Gel 2 Gram(s) Topical four times a day PRN pain  diphenhydrAMINE Elixir 25 milliGRAM(s) Oral every 6 hours PRN Rash and/or Itching  guaifenesin/dextromethorphan Oral Liquid 10 milliLiter(s) Oral every 6 hours PRN Cough  oxyCODONE    Solution 2.5 milliGRAM(s) Oral every 6 hours PRN Moderate Pain (4 - 6)  sodium chloride 0.65% Nasal 1 Spray(s) Both Nostrils every 6 hours PRN Nasal Congestion      LABS:                        9.3    7.95  )-----------( 117      ( 18 Dec 2023 11:34 )             31.3     12-18    137  |  98  |  18  ----------------------------<  153<H>  4.0   |  29  |  <0.30<L>    Ca    9.5      18 Dec 2023 11:34  Phos  4.2     12-18  Mg     1.6     12-18        Urinalysis Basic - ( 18 Dec 2023 11:34 )    Color: x / Appearance: x / SG: x / pH: x  Gluc: 153 mg/dL / Ketone: x  / Bili: x / Urobili: x   Blood: x / Protein: x / Nitrite: x   Leuk Esterase: x / RBC: x / WBC x   Sq Epi: x / Non Sq Epi: x / Bacteria: x          CAPILLARY BLOOD GLUCOSE    MICROBIOLOGY:     RADIOLOGY:  [ ] Reviewed and interpreted by me    Mode: AC/ CMV (Assist Control/ Continuous Mandatory Ventilation)  RR (machine): 12  TV (machine): 300  FiO2: 30  PEEP: 5  ITime: 1  MAP: 9  PIP: 28   Patient is a 73y old  Female who presents with a chief complaint of bleeding (04 Dec 2023 16:57)      Interval Events:  No acute events overnight.     REVIEW OF SYSTEMS:  [ ] Positive  [X] All other systems negative  [ ] Unable to assess ROS because ________    Vital Signs Last 24 Hrs  T(C): 36.2 (12-19-23 @ 06:25), Max: 37.3 (12-18-23 @ 14:00)  T(F): 97.1 (12-19-23 @ 06:25), Max: 99.2 (12-18-23 @ 14:00)  HR: 84 (12-19-23 @ 06:25) (84 - 104)  BP: 125/67 (12-19-23 @ 06:25) (118/66 - 144/73)  RR: 14 (12-19-23 @ 06:25) (14 - 18)  SpO2: 100% (12-19-23 @ 06:25) (99% - 100%)    PHYSICAL EXAM:  HEENT:   [X]Tracheostomy: #8 distal XLT Shiley   [X]Pupils equal  [ ]No oral lesions  [X]Abnormal - + missing teeth, + loose teeth    SKIN  [ ]No Rash  [ ] Abnormal  [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    CARDIAC  [X]Regular  [ ]Abnormal    PULMONARY  [ ]Bilateral Clear Breath Sounds  [ ]Normal Excursion  [X]Abnormal - mildly coarse BS BL    GI  [X]PEG      [X] +BS		              [X]Soft, nondistended, nontender	  [ ]Abnormal    MUSCULOSKELETAL                                   [X]Bedbound                 [ ]Abnormal    [ ]Ambulatory/OOB to chair                           EXTREMITIES                                         [X]Normal  [ ]Edema                           NEUROLOGIC  [X] Normal, non focal: opens eyes spontaneously, followings commands on all 4 extremities  [ ] Focal findings:    PSYCHIATRIC  [X]Alert and appropriate  [ ] Sedated	 [ ]Agitated    :  Mcbride: [ ] Yes, if yes: Date of Placement:                   [X] No    LINES: Central Lines [ ] Yes, if yes: Date of Placement                                     [X] No    HOSPITAL MEDICATIONS:  MEDICATIONS  (STANDING):  albuterol/ipratropium for Nebulization 3 milliLiter(s) Nebulizer every 6 hours  amLODIPine   Tablet 10 milliGRAM(s) Oral daily  artificial  tears Solution 2 Drop(s) Both EYES four times a day  ascorbic acid 500 milliGRAM(s) Oral daily  calcium carbonate    500 mG (Tums) Chewable 1 Tablet(s) Chew every 12 hours  chlorhexidine 0.12% Liquid 15 milliLiter(s) Oral Mucosa every 12 hours  chlorhexidine 4% Liquid 1 Application(s) Topical <User Schedule>  dextrose 50% Injectable 12.5 Gram(s) IV Push once  dextrose 50% Injectable 25 Gram(s) IV Push once  dextrose 50% Injectable 50 milliLiter(s) IV Push once  escitalopram 10 milliGRAM(s) Oral <User Schedule>  fentaNYL   Patch  25 MICROgram(s)/Hr 1 Patch Transdermal every 72 hours  gabapentin Solution 100 milliGRAM(s) Enteral Tube at bedtime  glucagon  Injectable 1 milliGRAM(s) IntraMuscular once  guaiFENesin Oral Liquid (Sugar-Free) 200 milliGRAM(s) Oral every 6 hours  hemorrhoidal Ointment      hemorrhoidal Ointment 1 Application(s) Rectal daily  insulin glargine Injectable (LANTUS) 18 Unit(s) SubCutaneous every morning  insulin lispro (ADMELOG) corrective regimen sliding scale   SubCutaneous every 6 hours  insulin regular  human recombinant 11 Unit(s) SubCutaneous <User Schedule>  insulin regular  human recombinant 24 Unit(s) SubCutaneous <User Schedule>  insulin regular  human recombinant 9 Unit(s) SubCutaneous <User Schedule>  levothyroxine 125 MICROGram(s) Oral <User Schedule>  lidocaine   4% Patch 1 Patch Transdermal daily  lidocaine 2% Viscous 5 milliLiter(s) Mucosal two times a day  melatonin 5 milliGRAM(s) Oral at bedtime  melatonin 1 milliGRAM(s) Oral at bedtime  multivitamin/minerals/iron Oral Solution (CENTRUM) 15 milliLiter(s) Oral daily  nystatin Powder 1 Application(s) Topical every 8 hours  pantoprazole   Suspension 40 milliGRAM(s) Enteral Tube <User Schedule>  petrolatum Ophthalmic Ointment 1 Application(s) Both EYES four times a day  predniSONE   Tablet 5 milliGRAM(s) Oral daily  QUEtiapine 12.5 milliGRAM(s) Oral <User Schedule>  simethicone 80 milliGRAM(s) Chew four times a day  witch hazel Pads 1 Application(s) Topical four times a day  zinc oxide 40% Paste 1 Application(s) Topical daily    MEDICATIONS  (PRN):  acetaminophen   Oral Liquid .. 1000 milliGRAM(s) Enteral Tube every 6 hours PRN Temp greater or equal to 38C (100.4F), Mild Pain (1 - 3)  benzocaine 20% Spray 1 Spray(s) Topical four times a day PRN Cough  diclofenac sodium 1% Gel 2 Gram(s) Topical four times a day PRN pain  diphenhydrAMINE Elixir 25 milliGRAM(s) Oral every 6 hours PRN Rash and/or Itching  guaifenesin/dextromethorphan Oral Liquid 10 milliLiter(s) Oral every 6 hours PRN Cough  oxyCODONE    Solution 2.5 milliGRAM(s) Oral every 6 hours PRN Moderate Pain (4 - 6)  sodium chloride 0.65% Nasal 1 Spray(s) Both Nostrils every 6 hours PRN Nasal Congestion      LABS:                        9.3    7.95  )-----------( 117      ( 18 Dec 2023 11:34 )             31.3     12-18    137  |  98  |  18  ----------------------------<  153<H>  4.0   |  29  |  <0.30<L>    Ca    9.5      18 Dec 2023 11:34  Phos  4.2     12-18  Mg     1.6     12-18        Urinalysis Basic - ( 18 Dec 2023 11:34 )    Color: x / Appearance: x / SG: x / pH: x  Gluc: 153 mg/dL / Ketone: x  / Bili: x / Urobili: x   Blood: x / Protein: x / Nitrite: x   Leuk Esterase: x / RBC: x / WBC x   Sq Epi: x / Non Sq Epi: x / Bacteria: x          CAPILLARY BLOOD GLUCOSE    MICROBIOLOGY:     RADIOLOGY:  [ ] Reviewed and interpreted by me    Mode: AC/ CMV (Assist Control/ Continuous Mandatory Ventilation)  RR (machine): 12  TV (machine): 300  FiO2: 30  PEEP: 5  ITime: 1  MAP: 9  PIP: 28

## 2023-12-19 NOTE — PROGRESS NOTE ADULT - PROBLEM SELECTOR PLAN 3
Seen by Dental Service on 11/27 and 12/15  - initially seen to rule out infectious source - no signs on infection seen on exam  - 12/15 reconsulted for extraction - no inpatient extraction, f/u outpt  - 12/18 reached out to dental for dental attending to discuss case with pts daughter, Marysol

## 2023-12-19 NOTE — PROGRESS NOTE ADULT - PROBLEM SELECTOR PLAN 8
- s/p Home Levemir 14 units in morning held on admission as noted w/ hypoglycemia   - Enteral feed changed to [employer] diabetic formula; glucose numbers stabilizing  - adjustments made throughout admission per endocrine recs - s/p Home Levemir 14 units in morning held on admission as noted w/ hypoglycemia   - Enteral feed changed to [employer] diabetic formula; glucose numbers stabilizing  - adjustments made throughout admission per endocrine recs

## 2023-12-19 NOTE — PROGRESS NOTE ADULT - ASSESSMENT
72 y/o F with PMHx of T2DM, HTN, HLD, anemia, hypothyroidism, RA, fibromyalgia, remote cerebral aneurysm repair, acute hypercapnic respiratory failure now with tracheostomy, PEG tube, and bedbound (trach change 8/18, PEG change 8/14), sacral pressure ulcer, BIBEMS from home for 6 day hx of bleeding from the tracheostomy and PEG tube with associated abdominal pain, recent history of auditory and visual hallucinations, and with chronic sacral decubitus ulcer. Exam notable for mild abdominal distention with LLQ and RLQ tenderness, tracheostomy in place with no obvious bleeding, PEG tube with scant amount of dried blood, at baseline neurologic status. Imaging showing no active bleeding around tracheostomy site, however was notable for mild thickening along PEG tube tract, sacral ulcer extending to the coccyx suggestive of possible osteomyelitis, and bibasilar patchy lung opacities with volume loss. Patient admitted for respiratory support, wound care of tracheostomy and PEG, and management of sacral osteomyelitis. No further Trach and peg site bleeding noted since admission.  Pelvic MRI imaging also suggestive of Acute OM. Patient placed on long-term antibiotics with Infectious disease guidance.  Additionally treated with a 7d course of Cefepime due to PSA and Serratia tracheitis on 11/8-->11/15 and then resumed cipro/keflex.  Hospital course c/b Delirium for which Seroquel was added and home Lexapro continued. Tracheostomy changed to #9 Cuffed [place] by ENT 11/3.  Despite trach change patient remained with persistent air leak.  On 11/19, the trach was again changed due to leak and loss of volumes on vent.  Trach was changed to #8 distal cuffed Shiley.  Patient seen by Dermatology for back lesions.  One diagnosed as a seborrheic keratitis and the other a dermatofibroma.  Intermittent abdominal pain throughout hospital course, at times relieved with gas/BM, w/u negative, seen by GI - no recs.  12/10 pt completed cipro and keflex for osteo treatment.  12/13 moderate amounts of secretions w/ blood - tranexamic acid neb given with notable improvement.  12/18 with blood tinged secretion again - TXA 250mg neb x2 doses.    12/18:  TXA 250mg x2 doses to be given for blood tinged secretions.  Dental called - Marysol (daughter) requesting phone call with attending to discuss recent recommendations for loose teeth.  PEG tube with leakage over the weekend - GI at bedside to assess, appreciate recs.  Reached out to Surgery - they only cover pegs placed by surgery.  Reached out to IR, reposition PEG as per IR.  74 y/o F with PMHx of T2DM, HTN, HLD, anemia, hypothyroidism, RA, fibromyalgia, remote cerebral aneurysm repair, acute hypercapnic respiratory failure now with tracheostomy, PEG tube, and bedbound (trach change 8/18, PEG change 8/14), sacral pressure ulcer, BIBEMS from home for 6 day hx of bleeding from the tracheostomy and PEG tube with associated abdominal pain, recent history of auditory and visual hallucinations, and with chronic sacral decubitus ulcer. Exam notable for mild abdominal distention with LLQ and RLQ tenderness, tracheostomy in place with no obvious bleeding, PEG tube with scant amount of dried blood, at baseline neurologic status. Imaging showing no active bleeding around tracheostomy site, however was notable for mild thickening along PEG tube tract, sacral ulcer extending to the coccyx suggestive of possible osteomyelitis, and bibasilar patchy lung opacities with volume loss. Patient admitted for respiratory support, wound care of tracheostomy and PEG, and management of sacral osteomyelitis. No further Trach and peg site bleeding noted since admission.  Pelvic MRI imaging also suggestive of Acute OM. Patient placed on long-term antibiotics with Infectious disease guidance.  Additionally treated with a 7d course of Cefepime due to PSA and Serratia tracheitis on 11/8-->11/15 and then resumed cipro/keflex.  Hospital course c/b Delirium for which Seroquel was added and home Lexapro continued. Tracheostomy changed to #9 Cuffed [place] by ENT 11/3.  Despite trach change patient remained with persistent air leak.  On 11/19, the trach was again changed due to leak and loss of volumes on vent.  Trach was changed to #8 distal cuffed Shiley.  Patient seen by Dermatology for back lesions.  One diagnosed as a seborrheic keratitis and the other a dermatofibroma.  Intermittent abdominal pain throughout hospital course, at times relieved with gas/BM, w/u negative, seen by GI - no recs.  12/10 pt completed cipro and keflex for osteo treatment.  12/13 moderate amounts of secretions w/ blood - tranexamic acid neb given with notable improvement.  12/18 with blood tinged secretion again - TXA 250mg neb x2 doses.    12/18:  TXA 250mg x2 doses to be given for blood tinged secretions.  Dental called - Marysol (daughter) requesting phone call with attending to discuss recent recommendations for loose teeth.  PEG tube with leakage over the weekend - GI at bedside to assess, appreciate recs.  Reached out to Surgery - they only cover pegs placed by surgery.  Reached out to IR, reposition PEG as per IR.  74 y/o F with PMHx of T2DM, HTN, HLD, anemia, hypothyroidism, RA, fibromyalgia, remote cerebral aneurysm repair, acute hypercapnic respiratory failure now with tracheostomy, PEG tube, and bedbound (trach change 8/18, PEG change 8/14), sacral pressure ulcer, BIBEMS from home for 6 day hx of bleeding from the tracheostomy and PEG tube with associated abdominal pain, recent history of auditory and visual hallucinations, and with chronic sacral decubitus ulcer. Exam notable for mild abdominal distention with LLQ and RLQ tenderness, tracheostomy in place with no obvious bleeding, PEG tube with scant amount of dried blood, at baseline neurologic status. Imaging showing no active bleeding around tracheostomy site, however was notable for mild thickening along PEG tube tract, sacral ulcer extending to the coccyx suggestive of possible osteomyelitis, and bibasilar patchy lung opacities with volume loss. Patient admitted for respiratory support, wound care of tracheostomy and PEG, and management of sacral osteomyelitis. No further Trach and peg site bleeding noted since admission.  Pelvic MRI imaging also suggestive of Acute OM. Patient placed on long-term antibiotics with Infectious disease guidance.  Additionally treated with a 7d course of Cefepime due to PSA and Serratia tracheitis on 11/8-->11/15 and then resumed cipro/keflex.  Hospital course c/b Delirium for which Seroquel was added and home Lexapro continued. Tracheostomy changed to #9 Cuffed [place] by ENT 11/3.  Despite trach change patient remained with persistent air leak.  On 11/19, the trach was again changed due to leak and loss of volumes on vent.  Trach was changed to #8 distal cuffed Shiley.  Patient seen by Dermatology for back lesions.  One diagnosed as a seborrheic keratitis and the other a dermatofibroma.  Intermittent abdominal pain throughout hospital course, at times relieved with gas/BM, w/u negative, seen by GI - no recs.  12/10 pt completed cipro and keflex for osteo treatment.  12/13 moderate amounts of secretions w/ blood - tranexamic acid neb given with notable improvement.  12/18 with blood tinged secretion again - TXA 250mg neb x2 doses.    12/19: Bloody secretions improved after TXA x2 yesterday.  Discussion with GI with plans for PEG. 72 y/o F with PMHx of T2DM, HTN, HLD, anemia, hypothyroidism, RA, fibromyalgia, remote cerebral aneurysm repair, acute hypercapnic respiratory failure now with tracheostomy, PEG tube, and bedbound (trach change 8/18, PEG change 8/14), sacral pressure ulcer, BIBEMS from home for 6 day hx of bleeding from the tracheostomy and PEG tube with associated abdominal pain, recent history of auditory and visual hallucinations, and with chronic sacral decubitus ulcer. Exam notable for mild abdominal distention with LLQ and RLQ tenderness, tracheostomy in place with no obvious bleeding, PEG tube with scant amount of dried blood, at baseline neurologic status. Imaging showing no active bleeding around tracheostomy site, however was notable for mild thickening along PEG tube tract, sacral ulcer extending to the coccyx suggestive of possible osteomyelitis, and bibasilar patchy lung opacities with volume loss. Patient admitted for respiratory support, wound care of tracheostomy and PEG, and management of sacral osteomyelitis. No further Trach and peg site bleeding noted since admission.  Pelvic MRI imaging also suggestive of Acute OM. Patient placed on long-term antibiotics with Infectious disease guidance.  Additionally treated with a 7d course of Cefepime due to PSA and Serratia tracheitis on 11/8-->11/15 and then resumed cipro/keflex.  Hospital course c/b Delirium for which Seroquel was added and home Lexapro continued. Tracheostomy changed to #9 Cuffed [place] by ENT 11/3.  Despite trach change patient remained with persistent air leak.  On 11/19, the trach was again changed due to leak and loss of volumes on vent.  Trach was changed to #8 distal cuffed Shiley.  Patient seen by Dermatology for back lesions.  One diagnosed as a seborrheic keratitis and the other a dermatofibroma.  Intermittent abdominal pain throughout hospital course, at times relieved with gas/BM, w/u negative, seen by GI - no recs.  12/10 pt completed cipro and keflex for osteo treatment.  12/13 moderate amounts of secretions w/ blood - tranexamic acid neb given with notable improvement.  12/18 with blood tinged secretion again - TXA 250mg neb x2 doses.    12/19: Bloody secretions improved after TXA x2 yesterday.  Discussion with GI with plans for PEG.

## 2023-12-19 NOTE — PROGRESS NOTE ADULT - PROBLEM SELECTOR PLAN 5
- LDL goal <70 due to DM  - Outpatient fasting lipid profile   - Consider moderate intensity Statin if not contraindicated and based on risks/benefits for pt  - Manage per primary team

## 2023-12-19 NOTE — PROGRESS NOTE ADULT - PROBLEM SELECTOR PLAN 4
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  - Tolerates intermittent PSV 15/5 during day/Vent HS  - Airway Clearance: Duoneb, Hypersal, Chest PT, PRN suctioning   - Maintain 02 sat > 92%    Tracheal Bleeding (Intermittent)-->resolved since admission   - S/p CT Angio neck: No evidence of active bleeding around tracheostomy site. No hematoma.  No collection   - Seen by ENT; Kath and b/l bronchi visualized without any trauma, small amount of blood tinged secretions resting at beginning of right bronchus not actively bleeding.  - 12/13 tracheal bleeding upon suctioning - tranexamic neb given  - 12/18 tranexamic neb x2 doses

## 2023-12-19 NOTE — PROGRESS NOTE ADULT - SUBJECTIVE AND OBJECTIVE BOX
DIABETES FOLLOW UP NOTE: Saw pt earlier today    Chief Complaint: Endocrine consult requested for management of T2DM    INTERVAL HX: Pt stable, alert> opened eyesand able to nod yes/no to simple questions. Denies any pain/CP/SOB. Per staff, pt tolerating TFs of [employer] glucose support 1.2 at 60cc/hr from 6am to 2am with BG levels at goal without hypoglycemia. On Prednisone 5mg.     Review of Systems:  General: As above. Nods  Cardiovascular: No chest pain, palpitations  Respiratory: No SOB, no cough  GI: No nausea, vomiting, abdominal pain  Endocrine: no  S&Sx of hypoglycemia    Allergies    penicillin (Unknown)  heparin (Unknown)  Tagamet (Unknown)  pineapple (Unknown)  walnut (Unknown)  Pecan, Filbert, Hazelnut (Unknown)  Lyrica (Unknown)    Intolerances      MEDICATIONS:  insulin glargine Injectable (LANTUS) 18 Unit(s) SubCutaneous every morning  insulin lispro (ADMELOG) corrective regimen sliding scale   SubCutaneous every 6 hours  insulin regular  human recombinant 9 Unit(s) SubCutaneous <User Schedule>  insulin regular  human recombinant 24 Unit(s) SubCutaneous <User Schedule>  insulin regular  human recombinant 11 Unit(s) SubCutaneous <User Schedule>  levothyroxine 125 MICROGram(s) Oral <User Schedule>  predniSONE   Tablet 5 milliGRAM(s) Oral daily      PHYSICAL EXAM:  VITALS: T(C): 37.2 (12-19-23 @ 18:01)  T(F): 98.9 (12-19-23 @ 18:01), Max: 98.9 (12-19-23 @ 18:01)  HR: 98 (12-19-23 @ 18:18) (82 - 110)  BP: 120/66 (12-19-23 @ 18:01) (106/62 - 125/67)  RR:  (14 - 22)  SpO2:  (97% - 100%)  Wt(kg): --  GENERAL: Female laying on bed in NAD.HHA at bedside  HEENT: Trach in place D&I  Abdomen: Soft, nontender, non distended. PEG in place with TFs going at 60cc/hr at time of visit  Extremities: Warm, no edema in all 4 exts  NEURO: Awake, nods yes/no to questions    LABS:  POCT Blood Glucose.: 181 mg/dL (12-19-23 @ 17:36)  POCT Blood Glucose.: 168 mg/dL (12-19-23 @ 11:33)  POCT Blood Glucose.: 151 mg/dL (12-19-23 @ 06:10)  POCT Blood Glucose.: 159 mg/dL (12-19-23 @ 00:32)  POCT Blood Glucose.: 150 mg/dL (12-18-23 @ 17:34)  POCT Blood Glucose.: 147 mg/dL (12-18-23 @ 12:19)  POCT Blood Glucose.: 158 mg/dL (12-18-23 @ 05:58)  POCT Blood Glucose.: 138 mg/dL (12-18-23 @ 00:36)  POCT Blood Glucose.: 121 mg/dL (12-17-23 @ 17:55)  POCT Blood Glucose.: 137 mg/dL (12-17-23 @ 13:53)  POCT Blood Glucose.: 111 mg/dL (12-17-23 @ 10:49)  POCT Blood Glucose.: 162 mg/dL (12-17-23 @ 05:34)  POCT Blood Glucose.: 134 mg/dL (12-17-23 @ 00:15)                            9.3    7.95  )-----------( 117      ( 18 Dec 2023 11:34 )             31.3       12-18    137  |  98  |  18  ----------------------------<  153<H>  4.0   |  29  |  <0.30<L>    eGFR: 113    Ca    9.5      12-18  Mg     1.6     12-18  Phos  4.2     12-18    Thyroid Function Tests:  12-04 @ 07:01 TSH 1.57 FreeT4 -- T3 60 Anti TPO -- Anti Thyroglobulin Ab -- TSI --  11-28 @ 06:37 TSH 2.80 FreeT4 1.2 T3 -- Anti TPO -- Anti Thyroglobulin Ab -- TSI --      A1C with Estimated Average Glucose Result: 7.5 % (10-28-23 @ 07:18)      Estimated Average Glucose: 169 mg/dL (10-28-23 @ 07:18)        11-25 Chol -- Direct LDL -- LDL calculated -- HDL -- Trig 192<H>                 DIABETES FOLLOW UP NOTE: Saw pt earlier today    Chief Complaint: Endocrine consult requested for management of T2DM    INTERVAL HX: Pt stable, alert> opened eyesand able to nod yes/no to simple questions. Denies any pain/CP/SOB. Per staff, pt tolerating TFs of [employer] glucose support 1.2 at 60cc/hr from 6am to 2am with BG levels at goal without hypoglycemia. On Prednisone 5mg.     Review of Systems:  General: As above. Nods  Cardiovascular: No chest pain, palpitations  Respiratory: No SOB, no cough  GI: No nausea, vomiting, abdominal pain  Endocrine: no  S&Sx of hypoglycemia    Allergies    penicillin (Unknown)  heparin (Unknown)  Tagamet (Unknown)  pineapple (Unknown)  walnut (Unknown)  Pecan, Filbert, Hazelnut (Unknown)  Lyrica (Unknown)    Intolerances      MEDICATIONS:  insulin glargine Injectable (LANTUS) 18 Unit(s) SubCutaneous every morning  insulin lispro (ADMELOG) corrective regimen sliding scale   SubCutaneous every 6 hours  insulin regular  human recombinant 9 Unit(s) SubCutaneous <User Schedule>  insulin regular  human recombinant 24 Unit(s) SubCutaneous <User Schedule>  insulin regular  human recombinant 11 Unit(s) SubCutaneous <User Schedule>  levothyroxine 125 MICROGram(s) Oral <User Schedule>  predniSONE   Tablet 5 milliGRAM(s) Oral daily      PHYSICAL EXAM:  VITALS: T(C): 37.2 (12-19-23 @ 18:01)  T(F): 98.9 (12-19-23 @ 18:01), Max: 98.9 (12-19-23 @ 18:01)  HR: 98 (12-19-23 @ 18:18) (82 - 110)  BP: 120/66 (12-19-23 @ 18:01) (106/62 - 125/67)  RR:  (14 - 22)  SpO2:  (97% - 100%)  Wt(kg): --  GENERAL: Female laying on bed in NAD.HHA at bedside  HEENT: Trach in place D&I  Abdomen: Soft, nontender, non distended. PEG in place with TFs going at 60cc/hr at time of visit  Extremities: Warm, no edema in all 4 exts  NEURO: Awake, nods yes/no to questions    LABS:  POCT Blood Glucose.: 181 mg/dL (12-19-23 @ 17:36)  POCT Blood Glucose.: 168 mg/dL (12-19-23 @ 11:33)  POCT Blood Glucose.: 151 mg/dL (12-19-23 @ 06:10)  POCT Blood Glucose.: 159 mg/dL (12-19-23 @ 00:32)  POCT Blood Glucose.: 150 mg/dL (12-18-23 @ 17:34)  POCT Blood Glucose.: 147 mg/dL (12-18-23 @ 12:19)  POCT Blood Glucose.: 158 mg/dL (12-18-23 @ 05:58)  POCT Blood Glucose.: 138 mg/dL (12-18-23 @ 00:36)  POCT Blood Glucose.: 121 mg/dL (12-17-23 @ 17:55)  POCT Blood Glucose.: 137 mg/dL (12-17-23 @ 13:53)  POCT Blood Glucose.: 111 mg/dL (12-17-23 @ 10:49)  POCT Blood Glucose.: 162 mg/dL (12-17-23 @ 05:34)  POCT Blood Glucose.: 134 mg/dL (12-17-23 @ 00:15)                            9.3    7.95  )-----------( 117      ( 18 Dec 2023 11:34 )             31.3       12-18    137  |  98  |  18  ----------------------------<  153<H>  4.0   |  29  |  <0.30<L>    eGFR: 113    Ca    9.5      12-18  Mg     1.6     12-18  Phos  4.2     12-18    Thyroid Function Tests:  12-04 @ 07:01 TSH 1.57 FreeT4 -- T3 60 Anti TPO -- Anti Thyroglobulin Ab -- TSI --  11-28 @ 06:37 TSH 2.80 FreeT4 1.2 T3 -- Anti TPO -- Anti Thyroglobulin Ab -- TSI --      A1C with Estimated Average Glucose Result: 7.5 % (10-28-23 @ 07:18)      Estimated Average Glucose: 169 mg/dL (10-28-23 @ 07:18)        11-25 Chol -- Direct LDL -- LDL calculated -- HDL -- Trig 192<H>

## 2023-12-20 LAB
ANION GAP SERPL CALC-SCNC: 10 MMOL/L — SIGNIFICANT CHANGE UP (ref 5–17)
ANION GAP SERPL CALC-SCNC: 10 MMOL/L — SIGNIFICANT CHANGE UP (ref 5–17)
BUN SERPL-MCNC: 19 MG/DL — SIGNIFICANT CHANGE UP (ref 7–23)
BUN SERPL-MCNC: 19 MG/DL — SIGNIFICANT CHANGE UP (ref 7–23)
CALCIUM SERPL-MCNC: 9.5 MG/DL — SIGNIFICANT CHANGE UP (ref 8.4–10.5)
CALCIUM SERPL-MCNC: 9.5 MG/DL — SIGNIFICANT CHANGE UP (ref 8.4–10.5)
CHLORIDE SERPL-SCNC: 97 MMOL/L — SIGNIFICANT CHANGE UP (ref 96–108)
CHLORIDE SERPL-SCNC: 97 MMOL/L — SIGNIFICANT CHANGE UP (ref 96–108)
CO2 SERPL-SCNC: 28 MMOL/L — SIGNIFICANT CHANGE UP (ref 22–31)
CO2 SERPL-SCNC: 28 MMOL/L — SIGNIFICANT CHANGE UP (ref 22–31)
CREAT SERPL-MCNC: <0.3 MG/DL — LOW (ref 0.5–1.3)
CREAT SERPL-MCNC: <0.3 MG/DL — LOW (ref 0.5–1.3)
CULTURE RESULTS: SIGNIFICANT CHANGE UP
EGFR: 113 ML/MIN/1.73M2 — SIGNIFICANT CHANGE UP
EGFR: 113 ML/MIN/1.73M2 — SIGNIFICANT CHANGE UP
GLUCOSE BLDC GLUCOMTR-MCNC: 146 MG/DL — HIGH (ref 70–99)
GLUCOSE BLDC GLUCOMTR-MCNC: 146 MG/DL — HIGH (ref 70–99)
GLUCOSE BLDC GLUCOMTR-MCNC: 161 MG/DL — HIGH (ref 70–99)
GLUCOSE BLDC GLUCOMTR-MCNC: 161 MG/DL — HIGH (ref 70–99)
GLUCOSE BLDC GLUCOMTR-MCNC: 190 MG/DL — HIGH (ref 70–99)
GLUCOSE BLDC GLUCOMTR-MCNC: 190 MG/DL — HIGH (ref 70–99)
GLUCOSE BLDC GLUCOMTR-MCNC: 265 MG/DL — HIGH (ref 70–99)
GLUCOSE BLDC GLUCOMTR-MCNC: 265 MG/DL — HIGH (ref 70–99)
GLUCOSE SERPL-MCNC: 166 MG/DL — HIGH (ref 70–99)
GLUCOSE SERPL-MCNC: 166 MG/DL — HIGH (ref 70–99)
GRAM STN FLD: ABNORMAL
GRAM STN FLD: ABNORMAL
HCT VFR BLD CALC: 28.2 % — LOW (ref 34.5–45)
HCT VFR BLD CALC: 28.2 % — LOW (ref 34.5–45)
HGB BLD-MCNC: 8.6 G/DL — LOW (ref 11.5–15.5)
HGB BLD-MCNC: 8.6 G/DL — LOW (ref 11.5–15.5)
LACTATE BLDV-MCNC: 1.6 MMOL/L — SIGNIFICANT CHANGE UP (ref 0.5–2)
LACTATE BLDV-MCNC: 1.6 MMOL/L — SIGNIFICANT CHANGE UP (ref 0.5–2)
MCHC RBC-ENTMCNC: 27.9 PG — SIGNIFICANT CHANGE UP (ref 27–34)
MCHC RBC-ENTMCNC: 27.9 PG — SIGNIFICANT CHANGE UP (ref 27–34)
MCHC RBC-ENTMCNC: 30.5 GM/DL — LOW (ref 32–36)
MCHC RBC-ENTMCNC: 30.5 GM/DL — LOW (ref 32–36)
MCV RBC AUTO: 91.6 FL — SIGNIFICANT CHANGE UP (ref 80–100)
MCV RBC AUTO: 91.6 FL — SIGNIFICANT CHANGE UP (ref 80–100)
NRBC # BLD: 0 /100 WBCS — SIGNIFICANT CHANGE UP (ref 0–0)
NRBC # BLD: 0 /100 WBCS — SIGNIFICANT CHANGE UP (ref 0–0)
PLATELET # BLD AUTO: 94 K/UL — LOW (ref 150–400)
PLATELET # BLD AUTO: 94 K/UL — LOW (ref 150–400)
POTASSIUM SERPL-MCNC: 3.5 MMOL/L — SIGNIFICANT CHANGE UP (ref 3.5–5.3)
POTASSIUM SERPL-MCNC: 3.5 MMOL/L — SIGNIFICANT CHANGE UP (ref 3.5–5.3)
POTASSIUM SERPL-SCNC: 3.5 MMOL/L — SIGNIFICANT CHANGE UP (ref 3.5–5.3)
POTASSIUM SERPL-SCNC: 3.5 MMOL/L — SIGNIFICANT CHANGE UP (ref 3.5–5.3)
RAPID RVP RESULT: SIGNIFICANT CHANGE UP
RAPID RVP RESULT: SIGNIFICANT CHANGE UP
RBC # BLD: 3.08 M/UL — LOW (ref 3.8–5.2)
RBC # BLD: 3.08 M/UL — LOW (ref 3.8–5.2)
RBC # FLD: 16.6 % — HIGH (ref 10.3–14.5)
RBC # FLD: 16.6 % — HIGH (ref 10.3–14.5)
SARS-COV-2 RNA SPEC QL NAA+PROBE: SIGNIFICANT CHANGE UP
SARS-COV-2 RNA SPEC QL NAA+PROBE: SIGNIFICANT CHANGE UP
SODIUM SERPL-SCNC: 135 MMOL/L — SIGNIFICANT CHANGE UP (ref 135–145)
SODIUM SERPL-SCNC: 135 MMOL/L — SIGNIFICANT CHANGE UP (ref 135–145)
SPECIMEN SOURCE: SIGNIFICANT CHANGE UP
WBC # BLD: 10.2 K/UL — SIGNIFICANT CHANGE UP (ref 3.8–10.5)
WBC # BLD: 10.2 K/UL — SIGNIFICANT CHANGE UP (ref 3.8–10.5)
WBC # FLD AUTO: 10.2 K/UL — SIGNIFICANT CHANGE UP (ref 3.8–10.5)
WBC # FLD AUTO: 10.2 K/UL — SIGNIFICANT CHANGE UP (ref 3.8–10.5)

## 2023-12-20 PROCEDURE — 99232 SBSQ HOSP IP/OBS MODERATE 35: CPT

## 2023-12-20 PROCEDURE — 99233 SBSQ HOSP IP/OBS HIGH 50: CPT

## 2023-12-20 PROCEDURE — 93970 EXTREMITY STUDY: CPT | Mod: 26

## 2023-12-20 RX ORDER — OXYCODONE HYDROCHLORIDE 5 MG/1
2.5 TABLET ORAL EVERY 6 HOURS
Refills: 0 | Status: DISCONTINUED | OUTPATIENT
Start: 2023-12-20 | End: 2023-12-27

## 2023-12-20 RX ADMIN — ZINC OXIDE 1 APPLICATION(S): 200 OINTMENT TOPICAL at 12:13

## 2023-12-20 RX ADMIN — Medication 1 APPLICATION(S): at 17:43

## 2023-12-20 RX ADMIN — Medication 1 MILLIGRAM(S): at 21:59

## 2023-12-20 RX ADMIN — Medication 3 MILLILITER(S): at 11:22

## 2023-12-20 RX ADMIN — Medication 125 MICROGRAM(S): at 05:37

## 2023-12-20 RX ADMIN — Medication 1 TABLET(S): at 05:38

## 2023-12-20 RX ADMIN — OXYCODONE HYDROCHLORIDE 2.5 MILLIGRAM(S): 5 TABLET ORAL at 22:45

## 2023-12-20 RX ADMIN — Medication 5 MILLIGRAM(S): at 21:59

## 2023-12-20 RX ADMIN — AER TRAVELER 1 APPLICATION(S): 0.5 SOLUTION RECTAL; TOPICAL at 17:44

## 2023-12-20 RX ADMIN — Medication 2 DROP(S): at 12:41

## 2023-12-20 RX ADMIN — SIMETHICONE 80 MILLIGRAM(S): 80 TABLET, CHEWABLE ORAL at 23:59

## 2023-12-20 RX ADMIN — PANTOPRAZOLE SODIUM 40 MILLIGRAM(S): 20 TABLET, DELAYED RELEASE ORAL at 08:49

## 2023-12-20 RX ADMIN — Medication 1 APPLICATION(S): at 12:11

## 2023-12-20 RX ADMIN — AMLODIPINE BESYLATE 10 MILLIGRAM(S): 2.5 TABLET ORAL at 05:37

## 2023-12-20 RX ADMIN — INSULIN HUMAN 9 UNIT(S): 100 INJECTION, SOLUTION SUBCUTANEOUS at 17:49

## 2023-12-20 RX ADMIN — Medication 1 TABLET(S): at 17:41

## 2023-12-20 RX ADMIN — CEFEPIME 100 MILLIGRAM(S): 1 INJECTION, POWDER, FOR SOLUTION INTRAMUSCULAR; INTRAVENOUS at 06:48

## 2023-12-20 RX ADMIN — AER TRAVELER 1 APPLICATION(S): 0.5 SOLUTION RECTAL; TOPICAL at 12:12

## 2023-12-20 RX ADMIN — LIDOCAINE 5 MILLILITER(S): 4 CREAM TOPICAL at 05:39

## 2023-12-20 RX ADMIN — CHLORHEXIDINE GLUCONATE 15 MILLILITER(S): 213 SOLUTION TOPICAL at 17:44

## 2023-12-20 RX ADMIN — NYSTATIN CREAM 1 APPLICATION(S): 100000 CREAM TOPICAL at 21:59

## 2023-12-20 RX ADMIN — SIMETHICONE 80 MILLIGRAM(S): 80 TABLET, CHEWABLE ORAL at 05:37

## 2023-12-20 RX ADMIN — QUETIAPINE FUMARATE 12.5 MILLIGRAM(S): 200 TABLET, FILM COATED ORAL at 17:39

## 2023-12-20 RX ADMIN — INSULIN HUMAN 24 UNIT(S): 100 INJECTION, SOLUTION SUBCUTANEOUS at 05:39

## 2023-12-20 RX ADMIN — Medication 200 MILLIGRAM(S): at 23:59

## 2023-12-20 RX ADMIN — GABAPENTIN 100 MILLIGRAM(S): 400 CAPSULE ORAL at 21:58

## 2023-12-20 RX ADMIN — Medication 5 MILLIGRAM(S): at 05:37

## 2023-12-20 RX ADMIN — INSULIN GLARGINE 18 UNIT(S): 100 INJECTION, SOLUTION SUBCUTANEOUS at 08:49

## 2023-12-20 RX ADMIN — Medication 2 DROP(S): at 05:37

## 2023-12-20 RX ADMIN — CHLORHEXIDINE GLUCONATE 1 APPLICATION(S): 213 SOLUTION TOPICAL at 05:38

## 2023-12-20 RX ADMIN — NYSTATIN CREAM 1 APPLICATION(S): 100000 CREAM TOPICAL at 05:40

## 2023-12-20 RX ADMIN — Medication 3: at 12:40

## 2023-12-20 RX ADMIN — NYSTATIN CREAM 1 APPLICATION(S): 100000 CREAM TOPICAL at 17:39

## 2023-12-20 RX ADMIN — Medication 3 MILLILITER(S): at 17:23

## 2023-12-20 RX ADMIN — Medication 200 MILLIGRAM(S): at 12:11

## 2023-12-20 RX ADMIN — LIDOCAINE 1 PATCH: 4 CREAM TOPICAL at 12:12

## 2023-12-20 RX ADMIN — AER TRAVELER 1 APPLICATION(S): 0.5 SOLUTION RECTAL; TOPICAL at 05:38

## 2023-12-20 RX ADMIN — LIDOCAINE 5 MILLILITER(S): 4 CREAM TOPICAL at 17:49

## 2023-12-20 RX ADMIN — Medication 200 MILLIGRAM(S): at 05:38

## 2023-12-20 RX ADMIN — Medication 200 MILLIGRAM(S): at 17:43

## 2023-12-20 RX ADMIN — PHENYLEPHRINE-SHARK LIVER OIL-MINERAL OIL-PETROLATUM RECTAL OINTMENT 1 APPLICATION(S): at 12:14

## 2023-12-20 RX ADMIN — SIMETHICONE 80 MILLIGRAM(S): 80 TABLET, CHEWABLE ORAL at 17:49

## 2023-12-20 RX ADMIN — Medication 500 MILLIGRAM(S): at 12:12

## 2023-12-20 RX ADMIN — LIDOCAINE 1 PATCH: 4 CREAM TOPICAL at 20:53

## 2023-12-20 RX ADMIN — Medication 3 MILLILITER(S): at 00:00

## 2023-12-20 RX ADMIN — Medication 1: at 23:59

## 2023-12-20 RX ADMIN — INSULIN HUMAN 11 UNIT(S): 100 INJECTION, SOLUTION SUBCUTANEOUS at 12:40

## 2023-12-20 RX ADMIN — Medication 3 MILLILITER(S): at 06:35

## 2023-12-20 RX ADMIN — Medication 1 APPLICATION(S): at 23:58

## 2023-12-20 RX ADMIN — SIMETHICONE 80 MILLIGRAM(S): 80 TABLET, CHEWABLE ORAL at 12:11

## 2023-12-20 RX ADMIN — Medication 1 APPLICATION(S): at 05:36

## 2023-12-20 RX ADMIN — ESCITALOPRAM OXALATE 10 MILLIGRAM(S): 10 TABLET, FILM COATED ORAL at 08:49

## 2023-12-20 RX ADMIN — OXYCODONE HYDROCHLORIDE 2.5 MILLIGRAM(S): 5 TABLET ORAL at 21:59

## 2023-12-20 RX ADMIN — Medication 3 MILLILITER(S): at 23:07

## 2023-12-20 RX ADMIN — Medication 2 DROP(S): at 17:44

## 2023-12-20 RX ADMIN — CHLORHEXIDINE GLUCONATE 15 MILLILITER(S): 213 SOLUTION TOPICAL at 05:37

## 2023-12-20 RX ADMIN — Medication 15 MILLILITER(S): at 12:12

## 2023-12-20 RX ADMIN — Medication 1: at 17:50

## 2023-12-20 NOTE — PROGRESS NOTE ADULT - SUBJECTIVE AND OBJECTIVE BOX
Patient is a 73y old  Female who presents with a chief complaint of bleeding (04 Dec 2023 16:57)      Interval Events:    REVIEW OF SYSTEMS:  [ ] Positive  [ ] All other systems negative  [ ] Unable to assess ROS because ________    Vital Signs Last 24 Hrs  T(C): 36.9 (12-20-23 @ 03:36), Max: 38.9 (12-19-23 @ 21:21)  T(F): 98.4 (12-20-23 @ 03:36), Max: 102 (12-19-23 @ 21:21)  HR: 78 (12-20-23 @ 06:00) (76 - 111)  BP: 118/53 (12-20-23 @ 06:00) (94/44 - 138/90)  RR: 12 (12-20-23 @ 06:00) (12 - 26)  SpO2: 100% (12-20-23 @ 06:00) (97% - 100%)    PHYSICAL EXAM:  HEENT:   [ ]Tracheostomy:  [ ]Pupils equal  [ ]No oral lesions  [ ]Abnormal    SKIN  [ ]No Rash  [ ] Abnormal  [ ] pressure    CARDIAC  [ ]Regular  [ ]Abnormal    PULMONARY  [ ]Bilateral Clear Breath Sounds  [ ]Normal Excursion  [ ]Abnormal    GI  [ ]PEG      [ ] +BS		              [ ]Soft, nondistended, nontender	  [ ]Abnormal    MUSCULOSKELETAL                                   [ ]Bedbound                 [ ]Abnormal    [ ]Ambulatory/OOB to chair                           EXTREMITIES                                         [ ]Normal  [ ]Edema                           NEUROLOGIC  [ ] Normal, non focal  [ ] Focal findings:    PSYCHIATRIC  [ ]Alert and appropriate  [ ] Sedated	 [ ]Agitated    :  Mcbride: [ ] Yes, if yes: Date of Placement:                   [  ] No    LINES: Central Lines [ ] Yes, if yes: Date of Placement                                     [  ] No    HOSPITAL MEDICATIONS:  MEDICATIONS  (STANDING):  albuterol/ipratropium for Nebulization 3 milliLiter(s) Nebulizer every 6 hours  amLODIPine   Tablet 10 milliGRAM(s) Oral daily  artificial  tears Solution 2 Drop(s) Both EYES four times a day  ascorbic acid 500 milliGRAM(s) Oral daily  calcium carbonate    500 mG (Tums) Chewable 1 Tablet(s) Chew every 12 hours  cefepime   IVPB      cefepime   IVPB 1000 milliGRAM(s) IV Intermittent every 8 hours  chlorhexidine 0.12% Liquid 15 milliLiter(s) Oral Mucosa every 12 hours  chlorhexidine 4% Liquid 1 Application(s) Topical <User Schedule>  dextrose 50% Injectable 25 Gram(s) IV Push once  dextrose 50% Injectable 12.5 Gram(s) IV Push once  dextrose 50% Injectable 50 milliLiter(s) IV Push once  escitalopram 10 milliGRAM(s) Oral <User Schedule>  gabapentin Solution 100 milliGRAM(s) Enteral Tube at bedtime  glucagon  Injectable 1 milliGRAM(s) IntraMuscular once  guaiFENesin Oral Liquid (Sugar-Free) 200 milliGRAM(s) Oral every 6 hours  hemorrhoidal Ointment 1 Application(s) Rectal daily  hemorrhoidal Ointment      insulin glargine Injectable (LANTUS) 18 Unit(s) SubCutaneous every morning  insulin lispro (ADMELOG) corrective regimen sliding scale   SubCutaneous every 6 hours  insulin regular  human recombinant 24 Unit(s) SubCutaneous <User Schedule>  insulin regular  human recombinant 11 Unit(s) SubCutaneous <User Schedule>  insulin regular  human recombinant 9 Unit(s) SubCutaneous <User Schedule>  levothyroxine 125 MICROGram(s) Oral <User Schedule>  lidocaine   4% Patch 1 Patch Transdermal daily  lidocaine 2% Viscous 5 milliLiter(s) Mucosal two times a day  melatonin 1 milliGRAM(s) Oral at bedtime  melatonin 5 milliGRAM(s) Oral at bedtime  multivitamin/minerals/iron Oral Solution (CENTRUM) 15 milliLiter(s) Oral daily  nystatin Powder 1 Application(s) Topical every 8 hours  pantoprazole   Suspension 40 milliGRAM(s) Enteral Tube <User Schedule>  petrolatum Ophthalmic Ointment 1 Application(s) Both EYES four times a day  predniSONE   Tablet 5 milliGRAM(s) Oral daily  QUEtiapine 12.5 milliGRAM(s) Oral <User Schedule>  silver nitrate Applicator 1 Application(s) Topical once  simethicone 80 milliGRAM(s) Chew four times a day  witch hazel Pads 1 Application(s) Topical four times a day  zinc oxide 40% Paste 1 Application(s) Topical daily    MEDICATIONS  (PRN):  acetaminophen   Oral Liquid .. 1000 milliGRAM(s) Enteral Tube every 6 hours PRN Temp greater or equal to 38C (100.4F), Mild Pain (1 - 3)  benzocaine 20% Spray 1 Spray(s) Topical four times a day PRN Cough  diclofenac sodium 1% Gel 2 Gram(s) Topical four times a day PRN pain  diphenhydrAMINE Elixir 25 milliGRAM(s) Oral every 6 hours PRN Rash and/or Itching  guaifenesin/dextromethorphan Oral Liquid 10 milliLiter(s) Oral every 6 hours PRN Cough  oxyCODONE    Solution 2.5 milliGRAM(s) Oral every 6 hours PRN Moderate Pain (4 - 6)  sodium chloride 0.65% Nasal 1 Spray(s) Both Nostrils every 6 hours PRN Nasal Congestion      LABS:                        8.6    10.20 )-----------( 94       ( 20 Dec 2023 04:13 )             28.2     12-20    135  |  97  |  19  ----------------------------<  166<H>  3.5   |  28  |  <0.30<L>    Ca    9.5      20 Dec 2023 04:13  Phos  4.2     12-18  Mg     1.6     12-18        Urinalysis Basic - ( 20 Dec 2023 04:13 )    Color: x / Appearance: x / SG: x / pH: x  Gluc: 166 mg/dL / Ketone: x  / Bili: x / Urobili: x   Blood: x / Protein: x / Nitrite: x   Leuk Esterase: x / RBC: x / WBC x   Sq Epi: x / Non Sq Epi: x / Bacteria: x        Venous Blood Gas:  12-20 @ 04:14  --/--/--/--/--  VBG Lactate: 1.6    CAPILLARY BLOOD GLUCOSE    MICROBIOLOGY:     RADIOLOGY:  [ ] Reviewed and interpreted by me    Mode: AC/ CMV (Assist Control/ Continuous Mandatory Ventilation)  RR (machine): 12  TV (machine): 300  FiO2: 30  PEEP: 5  ITime: 1  MAP: 10  PIP: 30   Patient is a 73y old  Female who presents with a chief complaint of bleeding (04 Dec 2023 16:57)      Interval Events:  No acute events overnight.    REVIEW OF SYSTEMS:  [ ] Positive  [X] All other systems negative  [ ] Unable to assess ROS because ________    Vital Signs Last 24 Hrs  T(C): 36.9 (12-20-23 @ 03:36), Max: 38.9 (12-19-23 @ 21:21)  T(F): 98.4 (12-20-23 @ 03:36), Max: 102 (12-19-23 @ 21:21)  HR: 78 (12-20-23 @ 06:00) (76 - 111)  BP: 118/53 (12-20-23 @ 06:00) (94/44 - 138/90)  RR: 12 (12-20-23 @ 06:00) (12 - 26)  SpO2: 100% (12-20-23 @ 06:00) (97% - 100%)    PHYSICAL EXAM:  HEENT:   [X]Tracheostomy: #8 distal XLT Shiley   [X]Pupils equal  [ ]No oral lesions  [X]Abnormal - + missing teeth, + loose teeth    SKIN  [ ]No Rash  [ ] Abnormal  [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    CARDIAC  [X]Regular  [ ]Abnormal    PULMONARY  [ ]Bilateral Clear Breath Sounds  [ ]Normal Excursion  [X]Abnormal - mildly coarse BS BL    GI  [X]PEG      [X] +BS		              [X]Soft, nondistended, nontender	  [ ]Abnormal    MUSCULOSKELETAL                                   [X]Bedbound                 [ ]Abnormal    [ ]Ambulatory/OOB to chair                           EXTREMITIES                                         [X]Normal  [ ]Edema                           NEUROLOGIC  [X] Normal, non focal: opens eyes spontaneously, followings commands on all 4 extremities  [ ] Focal findings:    PSYCHIATRIC  [X]Alert and appropriate  [ ] Sedated	 [ ]Agitated    :  Mcbride: [ ] Yes, if yes: Date of Placement:                   [X] No    LINES: Central Lines [ ] Yes, if yes: Date of Placement                                     [X] No    HOSPITAL MEDICATIONS:  MEDICATIONS  (STANDING):  albuterol/ipratropium for Nebulization 3 milliLiter(s) Nebulizer every 6 hours  amLODIPine   Tablet 10 milliGRAM(s) Oral daily  artificial  tears Solution 2 Drop(s) Both EYES four times a day  ascorbic acid 500 milliGRAM(s) Oral daily  calcium carbonate    500 mG (Tums) Chewable 1 Tablet(s) Chew every 12 hours  cefepime   IVPB      cefepime   IVPB 1000 milliGRAM(s) IV Intermittent every 8 hours  chlorhexidine 0.12% Liquid 15 milliLiter(s) Oral Mucosa every 12 hours  chlorhexidine 4% Liquid 1 Application(s) Topical <User Schedule>  dextrose 50% Injectable 25 Gram(s) IV Push once  dextrose 50% Injectable 12.5 Gram(s) IV Push once  dextrose 50% Injectable 50 milliLiter(s) IV Push once  escitalopram 10 milliGRAM(s) Oral <User Schedule>  gabapentin Solution 100 milliGRAM(s) Enteral Tube at bedtime  glucagon  Injectable 1 milliGRAM(s) IntraMuscular once  guaiFENesin Oral Liquid (Sugar-Free) 200 milliGRAM(s) Oral every 6 hours  hemorrhoidal Ointment 1 Application(s) Rectal daily  hemorrhoidal Ointment      insulin glargine Injectable (LANTUS) 18 Unit(s) SubCutaneous every morning  insulin lispro (ADMELOG) corrective regimen sliding scale   SubCutaneous every 6 hours  insulin regular  human recombinant 24 Unit(s) SubCutaneous <User Schedule>  insulin regular  human recombinant 11 Unit(s) SubCutaneous <User Schedule>  insulin regular  human recombinant 9 Unit(s) SubCutaneous <User Schedule>  levothyroxine 125 MICROGram(s) Oral <User Schedule>  lidocaine   4% Patch 1 Patch Transdermal daily  lidocaine 2% Viscous 5 milliLiter(s) Mucosal two times a day  melatonin 1 milliGRAM(s) Oral at bedtime  melatonin 5 milliGRAM(s) Oral at bedtime  multivitamin/minerals/iron Oral Solution (CENTRUM) 15 milliLiter(s) Oral daily  nystatin Powder 1 Application(s) Topical every 8 hours  pantoprazole   Suspension 40 milliGRAM(s) Enteral Tube <User Schedule>  petrolatum Ophthalmic Ointment 1 Application(s) Both EYES four times a day  predniSONE   Tablet 5 milliGRAM(s) Oral daily  QUEtiapine 12.5 milliGRAM(s) Oral <User Schedule>  silver nitrate Applicator 1 Application(s) Topical once  simethicone 80 milliGRAM(s) Chew four times a day  witch hazel Pads 1 Application(s) Topical four times a day  zinc oxide 40% Paste 1 Application(s) Topical daily    MEDICATIONS  (PRN):  acetaminophen   Oral Liquid .. 1000 milliGRAM(s) Enteral Tube every 6 hours PRN Temp greater or equal to 38C (100.4F), Mild Pain (1 - 3)  benzocaine 20% Spray 1 Spray(s) Topical four times a day PRN Cough  diclofenac sodium 1% Gel 2 Gram(s) Topical four times a day PRN pain  diphenhydrAMINE Elixir 25 milliGRAM(s) Oral every 6 hours PRN Rash and/or Itching  guaifenesin/dextromethorphan Oral Liquid 10 milliLiter(s) Oral every 6 hours PRN Cough  oxyCODONE    Solution 2.5 milliGRAM(s) Oral every 6 hours PRN Moderate Pain (4 - 6)  sodium chloride 0.65% Nasal 1 Spray(s) Both Nostrils every 6 hours PRN Nasal Congestion      LABS:                        8.6    10.20 )-----------( 94       ( 20 Dec 2023 04:13 )             28.2     12-20    135  |  97  |  19  ----------------------------<  166<H>  3.5   |  28  |  <0.30<L>    Ca    9.5      20 Dec 2023 04:13  Phos  4.2     12-18  Mg     1.6     12-18        Urinalysis Basic - ( 20 Dec 2023 04:13 )    Color: x / Appearance: x / SG: x / pH: x  Gluc: 166 mg/dL / Ketone: x  / Bili: x / Urobili: x   Blood: x / Protein: x / Nitrite: x   Leuk Esterase: x / RBC: x / WBC x   Sq Epi: x / Non Sq Epi: x / Bacteria: x        Venous Blood Gas:  12-20 @ 04:14  --/--/--/--/--  VBG Lactate: 1.6    CAPILLARY BLOOD GLUCOSE    MICROBIOLOGY:     RADIOLOGY:  [ ] Reviewed and interpreted by me    Mode: AC/ CMV (Assist Control/ Continuous Mandatory Ventilation)  RR (machine): 12  TV (machine): 300  FiO2: 30  PEEP: 5  ITime: 1  MAP: 10  PIP: 30

## 2023-12-20 NOTE — PROGRESS NOTE ADULT - PROBLEM SELECTOR PLAN 8
- s/p Home Levemir 14 units in morning held on admission as noted w/ hypoglycemia   - Enteral feed changed to [employer] diabetic formula; glucose numbers stabilizing  - adjustments made throughout admission per endocrine recs - s/p Home Levemir 14 units in morning held on admission as noted w/ hypoglycemia   - Enteral feed changed to FIRE1 diabetic formula; glucose numbers stabilizing  - adjustments made throughout admission per endocrine recs

## 2023-12-20 NOTE — PROGRESS NOTE ADULT - PROBLEM SELECTOR PLAN 1
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 aureus  - Sputum Cx 11/6: + PSA and Serratia, Cefepime 11/8 -->11/15  - 11/26 sputum culture MDR pseudomonas - clinically stable, defer treatment unless decompensates as per ID  - 11/26 urine culture - enterococcus - no need to treat - cfu low, organism not significant as per ID  -- see tx of sacral OM below Found w/ Bilateral PNA vs Atelectasis, and Possible OM Sacrum   - S/p Chest CT (10/28):  c/w Bilateral PNA vs Atelectasis   - s/p Vanco, Aztreonam   - Blood cx: 11/12 -- neg   - Sputum cx + Pseudomonas aeruginosa, S. aureus  - Sputum Cx 11/6: + PSA and Serratia, Cefepime 11/8 -->11/15  - 11/26 sputum culture MDR pseudomonas - clinically stable, defer treatment unless decompensates as per ID  - 11/26 urine culture - enterococcus - no need to treat - cfu low, organism not significant as per ID  - 12/19 respiked fever - cultures pending - received dose of vanco and cefepime - d/c'd, holding antibiotics until further results

## 2023-12-20 NOTE — PROGRESS NOTE ADULT - SUBJECTIVE AND OBJECTIVE BOX
CC: Fever    Called back to see patient for fever. Minimal clinical changes, minimal secretions. Reportedly slightly lethargic today.    Allergies  penicillin (Unknown)  heparin (Unknown)  Tagamet (Unknown)  pineapple (Unknown)  walnut (Unknown)  Pecan, Filbert, Hazelnut (Unknown)  Lyrica (Unknown)    ANTIMICROBIALS:      PE:    Vital Signs Last 24 Hrs  T(C): 36.9 (20 Dec 2023 03:36), Max: 38.9 (19 Dec 2023 21:21)  T(F): 98.4 (20 Dec 2023 03:36), Max: 102 (19 Dec 2023 21:21)  HR: 91 (20 Dec 2023 11:22) (76 - 111)  BP: 118/53 (20 Dec 2023 06:00) (94/44 - 138/90)  RR: 12 (20 Dec 2023 06:00) (12 - 26)  SpO2: 98% (20 Dec 2023 11:22) (97% - 100%)    Gen: AOx1, sleepy, arousable  CV: Nontachycardic  Resp: Breathing comfortably, trach  Abd: Soft, nontender  IV/Skin: No thrombophlebitis    LABS:                        8.6    10.20 )-----------( 94       ( 20 Dec 2023 04:13 )             28.2     12-20    135  |  97  |  19  ----------------------------<  166<H>  3.5   |  28  |  <0.30<L>    Ca    9.5      20 Dec 2023 04:13    Urinalysis Basic - ( 20 Dec 2023 04:13 )    Color: x / Appearance: x / SG: x / pH: x  Gluc: 166 mg/dL / Ketone: x  / Bili: x / Urobili: x   Blood: x / Protein: x / Nitrite: x   Leuk Esterase: x / RBC: x / WBC x   Sq Epi: x / Non Sq Epi: x / Bacteria: x    MICROBIOLOGY:    .Sputum Sputum  12-19-23 --  --    Few polymorphonuclear leukocytes per low power field  Rare Squamous epithelial cells per low power field  Moderate Gram Negative Rods per oil power field  Rare Gram Positive Rods per oil power field    .Blood Blood-Peripheral  12-15-23   No growth at 5 days  --  --    .Blood Blood-Peripheral  12-14-23   No growth at 5 days  --  --    Clean Catch Clean Catch (Midstream)  11-26-23   10,000 - 49,000 CFU/mL Enterococcus faecium (vancomycin resistant)  --  Enterococcus faecium (vancomycin resistant)    .Sputum Sputum  11-26-23   Numerous Pseudomonas aeruginosa  Normal Respiratory Shanda present  --  Pseudomonas aeruginosa    .Blood Blood-Peripheral  11-26-23   No growth at 5 days  --  --    .Blood Blood-Peripheral  11-26-23   No growth at 5 days  --  --    .Sputum Sputum  11-22-23   Numerous Mixed gram negative rods including  Numerous Pseudomonas aeruginosa (Carbapenem Resistant)  Normal Respiratory Shanda present  --  Pseudomonas aeruginosa (Carbapenem Resistant)    Clean Catch Clean Catch (Midstream)  11-22-23   <10,000 CFU/mL Normal Urogenital Shanda  --  --    .Blood Blood-Peripheral  11-22-23   No growth at 5 days  --  --    .Blood Blood-Peripheral  11-22-23   No growth at 5 days  --  --    Rapid RVP Result: NotDetec (12-14 @ 19:35)    RADIOLOGY:    12/19 XR    FINDINGS:  Lines and tubes: Tracheostomy tube terminates above the nick.  Heart/Vascular: Heart size is poorly assessed on this projection  Pulmonary: Stable small left greater than right pleural effusions with   bibasilar opacities. No pneumothorax.  Bones: No acute osseous abnormalities    IMPRESSION:  Stable small pleural effusions with associated atelectasis.

## 2023-12-20 NOTE — PROGRESS NOTE ADULT - ASSESSMENT
73 year old female with respiratory failure s/p trach and PEG, sacral osteomyelitis.     leakage from chronic PEG site. no signs of infection and site looks intact with some granulation tissue around the tube.  - Discussed with daughter at bedside, limited options available and I would avoid a repeat PEG.  - s/p trial topical silver nitrate to facilitate tract closure today, 12/20, will monitor site  - decrease rate of tube feeds / administer TF and meds by gravity if feasible  - cont PPI    The plan of care was discussed with the physician assistant and modifications were made to the notation where appropriate.   Differential diagnosis and plan of care discussed with patient after the evaluation  35 minutes spent on total encounter of which more than fifty percent of the encounter was spent counseling and/or coordinating care by the attending physician.    [facility]  Doris Morgan M.D.   Gastroenterology and Hepatology  [address] [place], [place]  Office: [phone]  Cell: [phone]   73 year old female with respiratory failure s/p trach and PEG, sacral osteomyelitis.     leakage from chronic PEG site. no signs of infection and site looks intact with some granulation tissue around the tube.  - Discussed with daughter at bedside, limited options available and I would avoid a repeat PEG.  - s/p trial topical silver nitrate to facilitate tract closure today, 12/20, will monitor site  - decrease rate of tube feeds / administer TF and meds by gravity if feasible  - cont PPI    The plan of care was discussed with the physician assistant and modifications were made to the notation where appropriate.   Differential diagnosis and plan of care discussed with patient after the evaluation  35 minutes spent on total encounter of which more than fifty percent of the encounter was spent counseling and/or coordinating care by the attending physician.    [facility]  Doris Morgan M.D.   Gastroenterology and Hepatology  [address] [place], [place]  Office: [phone]  Cell: [phone]

## 2023-12-20 NOTE — PROVIDER CONTACT NOTE (OTHER) - ACTION/TREATMENT ORDERED:
Tepid sponging done ice packs applied, [id number] mg via peg x1 given, blood , sputum and urine sample sent, Vancomycin 1 gm and cefepime 1 gm given Tepid sponging done ice packs applied, [id number] mg via peg x1 given, blood , sputum and urine sample sent, Vancomycin 1 gm and cefepime 1 gm given

## 2023-12-20 NOTE — PROGRESS NOTE ADULT - SUBJECTIVE AND OBJECTIVE BOX
INTERVAL HPI/OVERNIGHT EVENTS:  Patient seen and examined  still some leakage around PEG incision site  s/p silver nitrate application    MEDICATIONS  (STANDING):  albuterol/ipratropium for Nebulization 3 milliLiter(s) Nebulizer every 6 hours  amLODIPine   Tablet 10 milliGRAM(s) Oral daily  artificial  tears Solution 2 Drop(s) Both EYES four times a day  ascorbic acid 500 milliGRAM(s) Oral daily  calcium carbonate    500 mG (Tums) Chewable 1 Tablet(s) Chew every 12 hours  chlorhexidine 0.12% Liquid 15 milliLiter(s) Oral Mucosa every 12 hours  chlorhexidine 4% Liquid 1 Application(s) Topical <User Schedule>  dextrose 50% Injectable 25 Gram(s) IV Push once  dextrose 50% Injectable 50 milliLiter(s) IV Push once  dextrose 50% Injectable 12.5 Gram(s) IV Push once  escitalopram 10 milliGRAM(s) Oral <User Schedule>  fentaNYL   Patch  25 MICROgram(s)/Hr 1 Patch Transdermal every 72 hours  gabapentin Solution 100 milliGRAM(s) Enteral Tube at bedtime  glucagon  Injectable 1 milliGRAM(s) IntraMuscular once  guaiFENesin Oral Liquid (Sugar-Free) 200 milliGRAM(s) Oral every 6 hours  hemorrhoidal Ointment      hemorrhoidal Ointment 1 Application(s) Rectal daily  insulin glargine Injectable (LANTUS) 18 Unit(s) SubCutaneous every morning  insulin lispro (ADMELOG) corrective regimen sliding scale   SubCutaneous every 6 hours  insulin regular  human recombinant 11 Unit(s) SubCutaneous <User Schedule>  insulin regular  human recombinant 24 Unit(s) SubCutaneous <User Schedule>  insulin regular  human recombinant 9 Unit(s) SubCutaneous <User Schedule>  levothyroxine 125 MICROGram(s) Oral <User Schedule>  lidocaine   4% Patch 1 Patch Transdermal daily  lidocaine 2% Viscous 5 milliLiter(s) Mucosal two times a day  melatonin 5 milliGRAM(s) Oral at bedtime  melatonin 1 milliGRAM(s) Oral at bedtime  multivitamin/minerals/iron Oral Solution (CENTRUM) 15 milliLiter(s) Oral daily  nystatin Powder 1 Application(s) Topical every 8 hours  pantoprazole   Suspension 40 milliGRAM(s) Enteral Tube <User Schedule>  petrolatum Ophthalmic Ointment 1 Application(s) Both EYES four times a day  predniSONE   Tablet 5 milliGRAM(s) Oral daily  QUEtiapine 12.5 milliGRAM(s) Oral <User Schedule>  silver nitrate Applicator 1 Application(s) Topical once  simethicone 80 milliGRAM(s) Chew four times a day  witch hazel Pads 1 Application(s) Topical four times a day  zinc oxide 40% Paste 1 Application(s) Topical daily    MEDICATIONS  (PRN):  acetaminophen   Oral Liquid .. 1000 milliGRAM(s) Enteral Tube every 6 hours PRN Temp greater or equal to 38C (100.4F), Mild Pain (1 - 3)  benzocaine 20% Spray 1 Spray(s) Topical four times a day PRN Cough  diclofenac sodium 1% Gel 2 Gram(s) Topical four times a day PRN pain  diphenhydrAMINE Elixir 25 milliGRAM(s) Oral every 6 hours PRN Rash and/or Itching  guaifenesin/dextromethorphan Oral Liquid 10 milliLiter(s) Oral every 6 hours PRN Cough  oxyCODONE    Solution 2.5 milliGRAM(s) Oral every 6 hours PRN Moderate Pain (4 - 6)  sodium chloride 0.65% Nasal 1 Spray(s) Both Nostrils every 6 hours PRN Nasal Congestion      Allergies    penicillin (Unknown)  heparin (Unknown)  Tagamet (Unknown)  pineapple (Unknown)  walnut (Unknown)  Pecan, Filbert, Hazelnut (Unknown)  Lyrica (Unknown)    Intolerances        Review of Systems:    unable to obtain in completion      Vital Signs Last 24 Hrs  T(C): 36.7 (20 Dec 2023 12:03), Max: 38.9 (19 Dec 2023 21:21)  T(F): 98.1 (20 Dec 2023 12:03), Max: 102 (19 Dec 2023 21:21)  HR: 108 (20 Dec 2023 12:03) (76 - 111)  BP: 137/72 (20 Dec 2023 12:03) (94/44 - 138/90)  BP(mean): --  RR: 12 (20 Dec 2023 06:00) (12 - 26)  SpO2: 100% (20 Dec 2023 12:03) (97% - 100%)    Parameters below as of 20 Dec 2023 12:03  Patient On (Oxygen Delivery Method): ventilator        PHYSICAL EXAM:    GENERAL:  Appears stated age, well-groomed, well-nourished, no distress  HEENT:  NC/AT,  conjunctivae anicteric, clear and pink,   NECK: supple, trachea midline  +trach  CHEST:  Full & symmetric excursion, no increased effort, breath sounds clear  HEART:  Regular rhythm, no JVD  ABDOMEN:  Soft, non-tender, non-distended, normoactive bowel sounds,  no masses , no hepatosplenomegaly  +peg  EXTREMITIES:  no cyanosis, clubbing or edema  SKIN:  No rash, erythema, or, ecchymoses, no jaundice  NEURO:  Alert, non-focal, no asterixis  PSYCH: Appropriate affect, oriented to place and time  RECTAL: Deferred      LABS:                        8.6    10.20 )-----------( 94       ( 20 Dec 2023 04:13 )             28.2     12-20    135  |  97  |  19  ----------------------------<  166<H>  3.5   |  28  |  <0.30<L>    Ca    9.5      20 Dec 2023 04:13        Urinalysis Basic - ( 20 Dec 2023 04:13 )    Color: x / Appearance: x / SG: x / pH: x  Gluc: 166 mg/dL / Ketone: x  / Bili: x / Urobili: x   Blood: x / Protein: x / Nitrite: x   Leuk Esterase: x / RBC: x / WBC x   Sq Epi: x / Non Sq Epi: x / Bacteria: x        RADIOLOGY & ADDITIONAL TESTS:

## 2023-12-20 NOTE — PROGRESS NOTE ADULT - PROBLEM SELECTOR PLAN 2
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   - S/p CT abd/pelvis (10/28): Sacral decubitus ulcer extending to the coccyx with osseous remodeling and pelvic MRI or bone scan to recc to exclude osteomyelitis.  - 10/30 wound care note: Sacral stage 4 pressure injury 4.5cm x 2.5cm x 1.5cm w/ lip of undermining circumferentially greatest 9-12 of 3cm.  - MRI pelvis 10/30 with Osteomyelitis, completed Cefepime for 7 days, Vancomycin d/marli   - 11/10: resumed Cipro 500mg q 12 and Keflex 500mg q 6 until 12/10.  - 11/20: Changed to IV Cipro 400 q12 as recommended by ID pharmacist due to multiple drug interactions when given via PEG  - 11/28 wound care note: Sacral stage 4pressure injury 4.5cm x 2.5cm x 0.5cm, moist granular wound bed   palpable but not exposed bone no blistering, (+) serosanguinous drainage. No odor, erythema, increased warmth, tenderness, induration, fluctuance, nor crepitus.  - 12/3 wound culture ordered - d/c'd as no need for culture, wound improving, no signs of infection  - 12/10 completed cipro and keflex x5 week course.  - wound care following 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   - S/p CT abd/pelvis (10/28): Sacral decubitus ulcer extending to the coccyx with osseous remodeling and pelvic MRI or bone scan to recc to exclude osteomyelitis.  - 10/30 wound care note: Sacral stage 4 pressure injury 4.5cm x 2.5cm x 1.5cm w/ lip of undermining circumferentially greatest 9-12 of 3cm.  - MRI pelvis 10/30 with Osteomyelitis, completed Cefepime for 7 days, Vancomycin d/marli   - 11/10: resumed Cipro 500mg q 12 and Keflex 500mg q 6 until 12/10.  - 11/20: Changed to IV Cipro 400 q12 as recommended by ID pharmacist due to multiple drug interactions when given via PEG  - 11/28 wound care note: Sacral stage 4pressure injury 4.5cm x 2.5cm x 0.5cm, moist granular wound bed   palpable but not exposed bone no blistering, (+) serosanguinous drainage. No odor, erythema, increased warmth, tenderness, induration, fluctuance, nor crepitus.  - 12/3 wound culture ordered - d/c'd as no need for culture, wound improving, no signs of infection  - 12/10 completed cipro and keflex x5 week course  - wound care following

## 2023-12-20 NOTE — PROGRESS NOTE ADULT - ASSESSMENT
72 y/o F with PMHx of T2DM, HTN, HLD, anemia, hypothyroidism, RA, fibromyalgia, remote cerebral aneurysm repair, acute hypercapnic respiratory failure now with tracheostomy, PEG tube, and bedbound (trach change 8/18, PEG change 8/14), sacral pressure ulcer, BIBEMS from home for 6 day hx of bleeding from the tracheostomy and PEG tube with associated abdominal pain, recent history of auditory and visual hallucinations, and with chronic sacral decubitus ulcer. Exam notable for mild abdominal distention with LLQ and RLQ tenderness, tracheostomy in place with no obvious bleeding, PEG tube with scant amount of dried blood, at baseline neurologic status. Imaging showing no active bleeding around tracheostomy site, however was notable for mild thickening along PEG tube tract, sacral ulcer extending to the coccyx suggestive of possible osteomyelitis, and bibasilar patchy lung opacities with volume loss. Patient admitted for respiratory support, wound care of tracheostomy and PEG, and management of sacral osteomyelitis. No further Trach and peg site bleeding noted since admission.  Pelvic MRI imaging also suggestive of Acute OM. Patient placed on long-term antibiotics with Infectious disease guidance.  Additionally treated with a 7d course of Cefepime due to PSA and Serratia tracheitis on 11/8-->11/15 and then resumed cipro/keflex.  Hospital course c/b Delirium for which Seroquel was added and home Lexapro continued. Tracheostomy changed to #9 Cuffed [place] by ENT 11/3.  Despite trach change patient remained with persistent air leak.  On 11/19, the trach was again changed due to leak and loss of volumes on vent.  Trach was changed to #8 distal cuffed Shiley.  Patient seen by Dermatology for back lesions.  One diagnosed as a seborrheic keratitis and the other a dermatofibroma.  Intermittent abdominal pain throughout hospital course, at times relieved with gas/BM, w/u negative, seen by GI - no recs.  12/10 pt completed cipro and keflex for osteo treatment.  12/13 moderate amounts of secretions w/ blood - tranexamic acid neb given.      Wound Consult requested to assist w/ management of:  Sacral stage 4 pressure injury    Sacral wound- improving, continue w/Aquacel dressing QD  Trach Mngt as per RCU  PEG Mngt as per GI  BLE elevation  Abx per RCU/ ID  Moisturize intact skin w/ SWEEN cream BID  Nutrition Consult for optimization          encourage high quality protein, ayush/ prosource, MVI & Vit C to promote wound healing  Hyperglycemia -  ADA diet and  FS w/ ISS,  Continue turning and positioning w/ offloading assistive devices as per protocol  Buttock Kanchan BID and prn soiling        Continue w/ attends under pads and Pericare w/ emerson cath maintenance as per protocol  Waffle Cushion to chair when oob to chair  Continue w/ low air loss pressure redistribution bed surface   Pt will need Group 2 mattress on hospital bed and ROHO cushion for wheel chair upon discharge home  Care as per RCU, will follow w/ you  Upon discharge f/u as outpatient at [facility] [address] [facility] [phone]  d/w mary  RN & team   Angela Anthony PA-C CWS [id number]  Nights/ Weekends/ Holidays please call:  General Surgery Consult pager [phone]) for emergencies  Wound PT for multilayer leg wrapping or VAC issues ([phone])   I spent 35 minutes face to face w/ this pt of which more than 50% of the time was spent counseling & coordinating care of this pt.      72 y/o F with PMHx of T2DM, HTN, HLD, anemia, hypothyroidism, RA, fibromyalgia, remote cerebral aneurysm repair, acute hypercapnic respiratory failure now with tracheostomy, PEG tube, and bedbound (trach change 8/18, PEG change 8/14), sacral pressure ulcer, BIBEMS from home for 6 day hx of bleeding from the tracheostomy and PEG tube with associated abdominal pain, recent history of auditory and visual hallucinations, and with chronic sacral decubitus ulcer. Exam notable for mild abdominal distention with LLQ and RLQ tenderness, tracheostomy in place with no obvious bleeding, PEG tube with scant amount of dried blood, at baseline neurologic status. Imaging showing no active bleeding around tracheostomy site, however was notable for mild thickening along PEG tube tract, sacral ulcer extending to the coccyx suggestive of possible osteomyelitis, and bibasilar patchy lung opacities with volume loss. Patient admitted for respiratory support, wound care of tracheostomy and PEG, and management of sacral osteomyelitis. No further Trach and peg site bleeding noted since admission.  Pelvic MRI imaging also suggestive of Acute OM. Patient placed on long-term antibiotics with Infectious disease guidance.  Additionally treated with a 7d course of Cefepime due to PSA and Serratia tracheitis on 11/8-->11/15 and then resumed cipro/keflex.  Hospital course c/b Delirium for which Seroquel was added and home Lexapro continued. Tracheostomy changed to #9 Cuffed [place] by ENT 11/3.  Despite trach change patient remained with persistent air leak.  On 11/19, the trach was again changed due to leak and loss of volumes on vent.  Trach was changed to #8 distal cuffed Shiley.  Patient seen by Dermatology for back lesions.  One diagnosed as a seborrheic keratitis and the other a dermatofibroma.  Intermittent abdominal pain throughout hospital course, at times relieved with gas/BM, w/u negative, seen by GI - no recs.  12/10 pt completed cipro and keflex for osteo treatment.  12/13 moderate amounts of secretions w/ blood - tranexamic acid neb given.      Wound Consult requested to assist w/ management of:  Sacral stage 4 pressure injury    Sacral wound- improving, continue w/Aquacel dressing QD  Trach Mngt as per RCU  PEG Mngt as per GI  BLE elevation  Abx per RCU/ ID  Moisturize intact skin w/ SWEEN cream BID  Nutrition Consult for optimization          encourage high quality protein, ayush/ prosource, MVI & Vit C to promote wound healing  Hyperglycemia -  ADA diet and  FS w/ ISS,  Continue turning and positioning w/ offloading assistive devices as per protocol  Buttock Kanchan BID and prn soiling        Continue w/ attends under pads and Pericare w/ emerson cath maintenance as per protocol  Waffle Cushion to chair when oob to chair  Continue w/ low air loss pressure redistribution bed surface   Pt will need Group 2 mattress on hospital bed and ROHO cushion for wheel chair upon discharge home  Care as per RCU, will follow w/ you  Upon discharge f/u as outpatient at [facility] [address] [facility] [phone]  d/w mary  RN & team   Angela Anthony PA-C CWS [id number]  Nights/ Weekends/ Holidays please call:  General Surgery Consult pager [phone]) for emergencies  Wound PT for multilayer leg wrapping or VAC issues ([phone])   I spent 35 minutes face to face w/ this pt of which more than 50% of the time was spent counseling & coordinating care of this pt.

## 2023-12-20 NOTE — PROGRESS NOTE ADULT - ASSESSMENT
72 y/o F with PMHx of T2DM, HTN, HLD, anemia, hypothyroidism, RA, fibromyalgia, remote cerebral aneurysm repair, acute hypercapnic respiratory failure now with tracheostomy, PEG tube, and bedbound (trach change 8/18, PEG change 8/14), sacral pressure ulcer, BIBEMS from home for 6 day hx of bleeding from the tracheostomy and PEG tube with associated abdominal pain, recent history of auditory and visual hallucinations, and with chronic sacral decubitus ulcer. Exam notable for mild abdominal distention with LLQ and RLQ tenderness, tracheostomy in place with no obvious bleeding, PEG tube with scant amount of dried blood, at baseline neurologic status. Imaging showing no active bleeding around tracheostomy site, however was notable for mild thickening along PEG tube tract, sacral ulcer extending to the coccyx suggestive of possible osteomyelitis, and bibasilar patchy lung opacities with volume loss. Patient admitted for respiratory support, wound care of tracheostomy and PEG, and management of sacral osteomyelitis. No further Trach and peg site bleeding noted since admission.  Pelvic MRI imaging also suggestive of Acute OM. Patient placed on long-term antibiotics with Infectious disease guidance.  Additionally treated with a 7d course of Cefepime due to PSA and Serratia tracheitis on 11/8-->11/15 and then resumed cipro/keflex.  Hospital course c/b Delirium for which Seroquel was added and home Lexapro continued. Tracheostomy changed to #9 Cuffed [place] by ENT 11/3.  Despite trach change patient remained with persistent air leak.  On 11/19, the trach was again changed due to leak and loss of volumes on vent.  Trach was changed to #8 distal cuffed [place].  Patient seen by Dermatology for back lesions.  One diagnosed as a seborrheic keratitis and the other a dermatofibroma.  Intermittent abdominal pain throughout hospital course, at times relieved with gas/BM, w/u negative, seen by GI - no recs.  12/10 pt completed cipro and keflex for osteo treatment.  12/13 moderate amounts of secretions w/ blood - tranexamic acid neb given with notable improvement.  12/18 with blood tinged secretion again - TXA 250mg neb x2 doses.    12/19: Bloody secretions improved after TXA x2 yesterday.  Discussion with GI with plans for PEG. 72 y/o F with PMHx of T2DM, HTN, HLD, anemia, hypothyroidism, RA, fibromyalgia, remote cerebral aneurysm repair, acute hypercapnic respiratory failure now with tracheostomy, PEG tube, and bedbound (trach change 8/18, PEG change 8/14), sacral pressure ulcer, BIBEMS from home for 6 day hx of bleeding from the tracheostomy and PEG tube with associated abdominal pain, recent history of auditory and visual hallucinations, and with chronic sacral decubitus ulcer. Exam notable for mild abdominal distention with LLQ and RLQ tenderness, tracheostomy in place with no obvious bleeding, PEG tube with scant amount of dried blood, at baseline neurologic status. Imaging showing no active bleeding around tracheostomy site, however was notable for mild thickening along PEG tube tract, sacral ulcer extending to the coccyx suggestive of possible osteomyelitis, and bibasilar patchy lung opacities with volume loss. Patient admitted for respiratory support, wound care of tracheostomy and PEG, and management of sacral osteomyelitis. No further Trach and peg site bleeding noted since admission.  Pelvic MRI imaging also suggestive of Acute OM. Patient placed on long-term antibiotics with Infectious disease guidance.  Additionally treated with a 7d course of Cefepime due to PSA and Serratia tracheitis on 11/8-->11/15 and then resumed cipro/keflex.  Hospital course c/b Delirium for which Seroquel was added and home Lexapro continued. Tracheostomy changed to #9 Cuffed [place] by ENT 11/3.  Despite trach change patient remained with persistent air leak.  On 11/19, the trach was again changed due to leak and loss of volumes on vent.  Trach was changed to #8 distal cuffed [place].  Patient seen by Dermatology for back lesions.  One diagnosed as a seborrheic keratitis and the other a dermatofibroma.  Intermittent abdominal pain throughout hospital course, at times relieved with gas/BM, w/u negative, seen by GI - no recs.  12/10 pt completed cipro and keflex for osteo treatment.  12/13 moderate amounts of secretions w/ blood - tranexamic acid neb given with notable improvement.  12/18 with blood tinged secretion again - TXA 250mg neb x2 doses.    12/20: 74 y/o F with PMHx of T2DM, HTN, HLD, anemia, hypothyroidism, RA, fibromyalgia, remote cerebral aneurysm repair, acute hypercapnic respiratory failure now with tracheostomy, PEG tube, and bedbound (trach change 8/18, PEG change 8/14), sacral pressure ulcer, BIBEMS from home for 6 day hx of bleeding from the tracheostomy and PEG tube with associated abdominal pain, recent history of auditory and visual hallucinations, and with chronic sacral decubitus ulcer. Exam notable for mild abdominal distention with LLQ and RLQ tenderness, tracheostomy in place with no obvious bleeding, PEG tube with scant amount of dried blood, at baseline neurologic status. Imaging showing no active bleeding around tracheostomy site, however was notable for mild thickening along PEG tube tract, sacral ulcer extending to the coccyx suggestive of possible osteomyelitis, and bibasilar patchy lung opacities with volume loss. Patient admitted for respiratory support, wound care of tracheostomy and PEG, and management of sacral osteomyelitis. No further Trach and peg site bleeding noted since admission.  Pelvic MRI imaging also suggestive of Acute OM. Patient placed on long-term antibiotics with Infectious disease guidance.  Additionally treated with a 7d course of Cefepime due to PSA and Serratia tracheitis on 11/8-->11/15 and then resumed cipro/keflex.  Hospital course c/b Delirium for which Seroquel was added and home Lexapro continued. Tracheostomy changed to #9 Cuffed [place] by ENT 11/3.  Despite trach change patient remained with persistent air leak.  On 11/19, the trach was again changed due to leak and loss of volumes on vent.  Trach was changed to #8 distal cuffed [place].  Patient seen by Dermatology for back lesions.  One diagnosed as a seborrheic keratitis and the other a dermatofibroma.  Intermittent abdominal pain throughout hospital course, at times relieved with gas/BM, w/u negative, seen by GI - no recs.  12/10 pt completed cipro and keflex for osteo treatment.  12/13 moderate amounts of secretions w/ blood - tranexamic acid neb given with notable improvement.  12/18 with blood tinged secretion again - TXA 250mg neb x2 doses.  12/19 spiked fever to 102 - pancultured, results pending.    12/20:  Pt spiked fever to 102 last night.  Pancultured - UA negative.  Pt received dose of vanco and was started on cefepime.  Holding antibiotics for now, no leukocytosis, no respiratory distress, HDS.  ID recs appreciated.  GI applied silver nitrate to site - will monitor.   72 y/o F with PMHx of T2DM, HTN, HLD, anemia, hypothyroidism, RA, fibromyalgia, remote cerebral aneurysm repair, acute hypercapnic respiratory failure now with tracheostomy, PEG tube, and bedbound (trach change 8/18, PEG change 8/14), sacral pressure ulcer, BIBEMS from home for 6 day hx of bleeding from the tracheostomy and PEG tube with associated abdominal pain, recent history of auditory and visual hallucinations, and with chronic sacral decubitus ulcer. Exam notable for mild abdominal distention with LLQ and RLQ tenderness, tracheostomy in place with no obvious bleeding, PEG tube with scant amount of dried blood, at baseline neurologic status. Imaging showing no active bleeding around tracheostomy site, however was notable for mild thickening along PEG tube tract, sacral ulcer extending to the coccyx suggestive of possible osteomyelitis, and bibasilar patchy lung opacities with volume loss. Patient admitted for respiratory support, wound care of tracheostomy and PEG, and management of sacral osteomyelitis. No further Trach and peg site bleeding noted since admission.  Pelvic MRI imaging also suggestive of Acute OM. Patient placed on long-term antibiotics with Infectious disease guidance.  Additionally treated with a 7d course of Cefepime due to PSA and Serratia tracheitis on 11/8-->11/15 and then resumed cipro/keflex.  Hospital course c/b Delirium for which Seroquel was added and home Lexapro continued. Tracheostomy changed to #9 Cuffed [place] by ENT 11/3.  Despite trach change patient remained with persistent air leak.  On 11/19, the trach was again changed due to leak and loss of volumes on vent.  Trach was changed to #8 distal cuffed [place].  Patient seen by Dermatology for back lesions.  One diagnosed as a seborrheic keratitis and the other a dermatofibroma.  Intermittent abdominal pain throughout hospital course, at times relieved with gas/BM, w/u negative, seen by GI - no recs.  12/10 pt completed cipro and keflex for osteo treatment.  12/13 moderate amounts of secretions w/ blood - tranexamic acid neb given with notable improvement.  12/18 with blood tinged secretion again - TXA 250mg neb x2 doses.  12/19 spiked fever to 102 - pancultured, results still pending.    12/20:  Pt spiked fever to 102 last night.  Pancultured - UA negative - will follow up pending cultures.  Pt received dose of vanco and was started on cefepime.  Holding antibiotics for now, no leukocytosis, no respiratory distress, HDS.  ID recs appreciated.  GI applied silver nitrate to site - will monitor.  Spoke with dental resident again this am, plan for no extractions as confirmed with their attending, updated Marysol.  Pending RVP and low extremity dopplers.     74 y/o F with PMHx of T2DM, HTN, HLD, anemia, hypothyroidism, RA, fibromyalgia, remote cerebral aneurysm repair, acute hypercapnic respiratory failure now with tracheostomy, PEG tube, and bedbound (trach change 8/18, PEG change 8/14), sacral pressure ulcer, BIBEMS from home for 6 day hx of bleeding from the tracheostomy and PEG tube with associated abdominal pain, recent history of auditory and visual hallucinations, and with chronic sacral decubitus ulcer. Exam notable for mild abdominal distention with LLQ and RLQ tenderness, tracheostomy in place with no obvious bleeding, PEG tube with scant amount of dried blood, at baseline neurologic status. Imaging showing no active bleeding around tracheostomy site, however was notable for mild thickening along PEG tube tract, sacral ulcer extending to the coccyx suggestive of possible osteomyelitis, and bibasilar patchy lung opacities with volume loss. Patient admitted for respiratory support, wound care of tracheostomy and PEG, and management of sacral osteomyelitis. No further Trach and peg site bleeding noted since admission.  Pelvic MRI imaging also suggestive of Acute OM. Patient placed on long-term antibiotics with Infectious disease guidance.  Additionally treated with a 7d course of Cefepime due to PSA and Serratia tracheitis on 11/8-->11/15 and then resumed cipro/keflex.  Hospital course c/b Delirium for which Seroquel was added and home Lexapro continued. Tracheostomy changed to #9 Cuffed [place] by ENT 11/3.  Despite trach change patient remained with persistent air leak.  On 11/19, the trach was again changed due to leak and loss of volumes on vent.  Trach was changed to #8 distal cuffed [place].  Patient seen by Dermatology for back lesions.  One diagnosed as a seborrheic keratitis and the other a dermatofibroma.  Intermittent abdominal pain throughout hospital course, at times relieved with gas/BM, w/u negative, seen by GI - no recs.  12/10 pt completed cipro and keflex for osteo treatment.  12/13 moderate amounts of secretions w/ blood - tranexamic acid neb given with notable improvement.  12/18 with blood tinged secretion again - TXA 250mg neb x2 doses.  12/19 spiked fever to 102 - pancultured, results still pending.    12/20:  Pt spiked fever to 102 last night.  Pancultured - UA negative - will follow up pending cultures.  Pt received dose of vanco and was started on cefepime.  Holding antibiotics for now, no leukocytosis, no respiratory distress, HDS.  ID recs appreciated.  GI applied silver nitrate to site - will monitor.  Spoke with dental resident again this am, plan for no extractions as confirmed with their attending, updated Marysol.  Pending RVP and low extremity dopplers.

## 2023-12-20 NOTE — PROGRESS NOTE ADULT - PROBLEM SELECTOR PLAN 3
Seen by Dental Service on 11/27 and 12/15  - initially seen to rule out infectious source - no signs on infection seen on exam  - 12/15 reconsulted for extraction - no inpatient extraction, f/u outpt  - 12/18 reached out to dental for dental attending to discuss case with pts daughter, Marysol Seen by Dental Service on 11/27 and 12/15  - initially seen to rule out infectious source - no signs on infection seen on exam  - 12/15 reconsulted for extraction - no inpatient extraction, f/u outpt  - 12/18 reached out to dental for dental attending to discuss case with pts daughter, Marysol  - 12/20 updated daughter Marysol, she is aware of discussion and decision between dental resident and attending - that there will be no extraction inpatient and she is likely not going to receive a phone call from dental attending (Dr. Lee)

## 2023-12-20 NOTE — PROGRESS NOTE ADULT - ASSESSMENT
73 F PMH T2DM (A1c 7.5), HTN, HLD, anemia, hypothyroidism, RA, fibromyalgia, remote cerebral aneurysm repair, respiratory failure s/p Trach, s/p PEG, bedbound (trach change 8/18, PEG change 8/14), sacral wound, brought in for bleeding from trach and PEG site  Bleeding from trach  No fever, no leukocytosis  RVP neg  CT with PEG, sacral ulcer with osseous remodeling, patchy consolidative opacities  CXR with pneumonia  Sputum culture MSSA, pseudomonas  Treat for pneumonia  MR with evidence acute OM  Note wound care eval generally reassuring  Prolonged hospitalization, now with  Pseudomonas in sputum  Had low grade fever, however resp status is improved compared to prior; no secretions, no increased O2 requirements  WBC unchanged  Still with pseudomonas on trach on recent cultures  Fevers noted, no focal change otherwise  CXR 12/19 with pleural effusion/atelectasis  S/p prolonged course antibiotic for sacral wound/OM on this visit  WBC normal but ? relative rise  UA neg  Aspiration?  Overall, PNA/tracheitis, bleeding from trach, sacral wound  - Monitor off abx for now, low threshold to restart Cefepime if clinical worsening or signs pna/tracheitis  - Wound care to sacral wound  - Monitor for further signs infection  - F/U RVP  - F/U BCXs  - F/U UCX--would not treat based on UCX result alone given negative UA  - Trend CBC/monitor for further fevers    Discussed with RCU team    Thomas Ahn MD  Contact on TEAMS messaging from 9am - 5pm  From 5pm-9am, on weekends, or if no response call [phone] 73 F PMH T2DM (A1c 7.5), HTN, HLD, anemia, hypothyroidism, RA, fibromyalgia, remote cerebral aneurysm repair, respiratory failure s/p Trach, s/p PEG, bedbound (trach change 8/18, PEG change 8/14), sacral wound, brought in for bleeding from trach and PEG site  Bleeding from trach  No fever, no leukocytosis  RVP neg  CT with PEG, sacral ulcer with osseous remodeling, patchy consolidative opacities  CXR with pneumonia  Sputum culture MSSA, pseudomonas  Treat for pneumonia  MR with evidence acute OM  Note wound care eval generally reassuring  Prolonged hospitalization, now with  Pseudomonas in sputum  Had low grade fever, however resp status is improved compared to prior; no secretions, no increased O2 requirements  WBC unchanged  Still with pseudomonas on trach on recent cultures  Fevers noted, no focal change otherwise  CXR 12/19 with pleural effusion/atelectasis  S/p prolonged course antibiotic for sacral wound/OM on this visit  WBC normal but ? relative rise  UA neg  Aspiration?  Overall, PNA/tracheitis, bleeding from trach, sacral wound  - Monitor off abx for now, low threshold to restart Cefepime if clinical worsening or signs pna/tracheitis  - Wound care to sacral wound  - Monitor for further signs infection  - F/U RVP  - F/U BCXs  - F/U UCX--would not treat based on UCX result alone given negative UA  - Trend CBC/monitor for further fevers    Discussed with RCU team    Thomas Ahn MD  Contact on TEAMS messaging from 9am - 5pm  From 5pm-9am, on weekends, or if no response call [phone]

## 2023-12-20 NOTE — PROGRESS NOTE ADULT - NSPROGADDITIONALINFOA_GEN_ALL_CORE
-Plan discussed with team.  Contact info: [phone] (24/7). pager [phone]  Amion on [place]  Teams on M-T-W-F. Unavailable Thu/Weekends/Holidays  Assessed pt/labs/meds and discussed plan of care with primary team  Adjusting insulin  Discharge plan  Follow up care -Plan discussed with team.  Contact info: [phone] (24/7). pager [phone]  Amion on [place]  Teams on M-T-W-F. Unavailable Thu/Weekends/Holidays  Assessed pt/labs/meds and discussed plan of care with primary team  Adjusting insulin  Discharge plan  Follow up care

## 2023-12-20 NOTE — PROGRESS NOTE ADULT - ASSESSMENT
74 y/o F w/h/o T2DM with unknown control due to anemia of chronic disease skewing A1C levels. On Levemir and Humalog insulin PTA. Unknown DM complications. Also h/o HTN, HLD, anemia, hypothyroidism, RA, fibromyalgia, remote cerebral aneurysm repair, acute hypercapnic respiratory failure now with tracheostomy, PEG tube, and bedbound (trach change 8/18, PEG change 8/14), sacral pressure ulcer, BIBEMS from home for 6 day hx of bleeding from the tracheostomy and PEG tube with associated abdominal pain> now resolved. Endocrinology consulted for hyperglycemia. Tolerating TFs from 6am to 2am with BG levels mostly at goal with on/off hyperglycemic events without pattern. Will follow up BG in next 24 hours and make insulin adjustments once a pattern is identified. Pt with fever but has normal white count. Primary team following.  No hypoglycemia.                 72 y/o F w/h/o T2DM with unknown control due to anemia of chronic disease skewing A1C levels. On Levemir and Humalog insulin PTA. Unknown DM complications. Also h/o HTN, HLD, anemia, hypothyroidism, RA, fibromyalgia, remote cerebral aneurysm repair, acute hypercapnic respiratory failure now with tracheostomy, PEG tube, and bedbound (trach change 8/18, PEG change 8/14), sacral pressure ulcer, BIBEMS from home for 6 day hx of bleeding from the tracheostomy and PEG tube with associated abdominal pain> now resolved. Endocrinology consulted for hyperglycemia. Tolerating TFs from 6am to 2am with BG levels mostly at goal with on/off hyperglycemic events without pattern. Will follow up BG in next 24 hours and make insulin adjustments once a pattern is identified. Pt with fever but has normal white count. Primary team following.  No hypoglycemia.

## 2023-12-20 NOTE — PROVIDER CONTACT NOTE (OTHER) - RECOMMENDATIONS
Ativan
Wash peg site and use Bacitracin ointment at peg site
NP states give half of Regular insulin
12 units of corrective insulin ADMELOG subcutaneous, and nutrition consult
Blood cx x2, UA and urine culture, sputum cx, chest xray
retake finger stick

## 2023-12-20 NOTE — PROGRESS NOTE ADULT - NS ATTEND AMEND GEN_ALL_CORE FT
73F with a h/o DM, HTN, HLD, anemia, hypothyroidism, RA, fibromyalgia, remote cerebral aneurysm with repair, hypercapnic respiratory failure s/p tracheostomy/PEG, bedbound, sacral pressure ulcer. Admitted w/ bleeding from tracheostomy and PEG w/ associated abdominal pain, recent hx of auditory/visual hallucinations.   Imaging showed mild thickening along PEG tube tract and sacral ulcer extending to coccyx suggestive of acute osteomyelitis.      # Osteomyelitis  # Chronic hypoxemic RF  # oropharyngeal dysphagia  # acute on chronic pain  # Trach/Peg bleeding  # Tracheitis with Pseudomonas and serratia  # Hx of hallucination, anxiety     #Neuro - awake, alert, and interactive. moving all extremities, mouthing words. continue current medications  #Cardiovascular - hemodynamically stable  #Pulmonary - chronic hypoxemic respiratory failure, has 8XLT trach, on home vent, pressure support trials daily 15/5. No further hemoptysis after TXA nebs, Minimize suctioning as needed  #Gastro - oropharyngeal dysphagia with PEG tube in place, tolerating feeds, nutrition follow up appreciated   Appreciate GI eval of PEG site - pending final plan regarding intermittent leaking from stoma.    #ID - sacral osteo on MRI - wound care follow-up appreciated, c/w offloading and local wound care  -The patient has had this wound since a hospitalization in December 2022 and per daughter does not have chronic or multiple wounds but only this single wound.   - s/p 6 week course of Cipro and Keflex as per ID - completed 12/10/23  - Per daughter, patient has had prior multiple infections during hospitalizations including prior pseudomonas, MRSA, E faecalis, and Klebsiella  - Sputum colonized with MDR Pseudomonas   Febrile O/N - unclear etiology, UA negative, CXR and secretions unchanged. Send RVP and LE dopplers. Hold further antibiotics  #Hem/Onc - prior cytopenias in setting of antibiotics per patient's daughter   - Hem/Onc follow-up noted - suspect an anemia of chronic disease  #Pain - continue current pain control - in setting of fibromyalgia and pain from wound  - Voltaren topical, lidocaine patches and low dose Oxycodone as needed  #Derm - previously seen by derm for skin lesions - mid back with irritated seborrheic keratosis - outpatient follow-up  #Endo - DM2 - continue insulin and adjust as needed for goal FS < 180  - Endocrine follow-up appreciated   - Continue Synthroid - TSH WNL  #DVT proph - SCDs  - Prophylactic AC stopped after concern for prior bleeding.  Discharge planning.

## 2023-12-20 NOTE — PROGRESS NOTE ADULT - NUTRITIONAL ASSESSMENT
Diet, NPO with Tube Feed:   Tube Feeding Modality: Gastrostomy  CasieCrossFiber glucose support 1.2  Total Volume for 24 Hours (mL): 1200  Continuous  Starting Tube Feed Rate {mL per Hour}: 60  Increase Tube Feed Rate by (mL): 0  Until Goal Tube Feed Rate (mL per Hour): 60  Tube Feed Duration (in Hours): 20  Tube Feed Start Time: 06:00  Tube Feed Stop Time: 02:00  Free Water Flush  Pump   Rate (mL per Hour): 10   Frequency: Every 2 Hours  Alfred(7 Gm Arginine/7 Gm Glut/1.2 Gm HMB     Qty per Day:  2 (11-29-23)    EN order providing if 100% provision: 1440kcal (30kcal/kg) and 77g protein (1.6g/kg) Diet, NPO with Tube Feed:   Tube Feeding Modality: Gastrostomy  CasieQuewey glucose support 1.2  Total Volume for 24 Hours (mL): 1200  Continuous  Starting Tube Feed Rate {mL per Hour}: 60  Increase Tube Feed Rate by (mL): 0  Until Goal Tube Feed Rate (mL per Hour): 60  Tube Feed Duration (in Hours): 20  Tube Feed Start Time: 06:00  Tube Feed Stop Time: 02:00  Free Water Flush  Pump   Rate (mL per Hour): 10   Frequency: Every 2 Hours  Alfred(7 Gm Arginine/7 Gm Glut/1.2 Gm HMB     Qty per Day:  2 (11-29-23)    EN order providing if 100% provision: 1440kcal (30kcal/kg) and 77g protein (1.6g/kg)

## 2023-12-20 NOTE — PROGRESS NOTE ADULT - PROBLEM SELECTOR PLAN 1
- FS BG monitoring [id number] while on TFs/NPO   - Continue lantus 18 units sc qAM   - Continue Regular insulin 24 units sc at 6am, 11 units sc at 12 noon and 9 units sc at 6pm. PLEASE DO NOT HOLD IF PATIENT IS ON TUBE FEEDS (hold only if TF are stopped). Can decrease/adjust dose if there is a concern for hypoglycemia.   - C/w low dose admelog correction scale every 6 hours  - Will follow and adjust insulin as needed  DISCHARGE:  - Will be determined according to BG/insulin needs/TFs and steroid therapy at time of discharge. If discharge within next 24 hours will continue with same insulin types and doses as noted above with the exception of Admelog correction scale.   - Needs telemedicine as outpatient due to bedbound status. Can follow up at [facility] practice. [address] Phone . Make sure pt has Rx for all DM supplies and insulin. - FS BG monitoring [id number] while on TFs/NPO   - Continue lantus 18 units sc qAM   - Continue Regular insulin 24 units sc at 6am, 11 units sc at 12 noon and 9 units sc at 6pm. PLEASE DO NOT HOLD IF PATIENT IS ON TUBE FEEDS (hold only if TF are stopped). Can decrease/adjust dose if there is a concern for hypoglycemia.   - C/w low dose admelog correction scale every 6 hours  - Will follow and adjust insulin as needed  DISCHARGE:  - Will be determined according to BG/insulin needs/TFs and steroid therapy at time of discharge. If discharge within next 24 hours will continue with same insulin types and doses as noted above with the exception of Admelog correction scale.   - Needs telemedicine as outpatient due to bedbound status. Can follow up at [facility] practice. [address]. Phone . Make sure pt has Rx for all DM supplies and insulin.

## 2023-12-20 NOTE — PROGRESS NOTE ADULT - PROBLEM SELECTOR PLAN 5
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   - PEG Site appears macerated --> Multifactorial, possible the PEG has been too tight at home  - Peg loosened by GI at beside, no leakage or further intervention required   - recurrent RLQ abdominal pain and bleeding at the PEG site  - reevaluated by GI -- no bleeding at the PEG site  - 12/1: s/p abdominal ultrasound - limited study   - (12/3): Pt is c/o abd pain, and daughter is concerned   - AM amylase and Lipase all wnl , CT A/P 12/3 - no acute findings  - Continue Simethicone  - 12/16 PEG tube leakage - GI recs appreciated 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

## 2023-12-20 NOTE — PROGRESS NOTE ADULT - PROBLEM SELECTOR PLAN 4
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  - Tolerates intermittent PSV 15/5 during day/Vent HS  - Airway Clearance: Duoneb, Hypersal, Chest PT, PRN suctioning   - Maintain 02 sat > 92%    Tracheal Bleeding (Intermittent)-->resolved since admission   - S/p CT Angio neck: No evidence of active bleeding around tracheostomy site. No hematoma.  No collection   - Seen by ENT; Kath and b/l bronchi visualized without any trauma, small amount of blood tinged secretions resting at beginning of right bronchus not actively bleeding.  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  - Tolerates intermittent PSV 15/5 during day/Vent HS  - Airway Clearance: Duoneb, Hypersal, Chest PT, PRN suctioning   - Maintain 02 sat > 92%    Tracheal Bleeding (Intermittent)-->resolved since admission   - S/p CT Angio neck: No evidence of active bleeding around tracheostomy site. No hematoma.  No collection   - Seen by ENT; Kath and b/l bronchi visualized without any trauma, small amount of blood tinged secretions resting at beginning of right bronchus not actively bleeding.  - 12/13 tracheal bleeding upon suctioning - tranexamic neb given  - 12/18 tranexamic neb x2 doses - improved

## 2023-12-20 NOTE — PROGRESS NOTE ADULT - SUBJECTIVE AND OBJECTIVE BOX
DIABETES FOLLOW UP NOTE: Saw pt earlier today    Chief Complaint: Endocrine consult requested for management of T2DM    INTERVAL HX: Pt stable, alert> opened eyes but not answering  to questions today. Per staff, pt tolerating TFs of [employer] glucose support 1.2 at 60cc/hr from 6am to 2am with BG levels mostly at goal without hypoglycemia. On Prednisone 5mg. Noted one BG >200s at 12noon today but improved for 6pm reading. Per primary team pt had fever overnight.           Review of Systems:  General: As above  Unable      Allergies    penicillin (Unknown)  heparin (Unknown)  Tagamet (Unknown)  pineapple (Unknown)  walnut (Unknown)  Pecan, Filbert, Hazelnut (Unknown)  Lyrica (Unknown)    Intolerances      MEDICATIONS:  insulin glargine Injectable (LANTUS) 18 Unit(s) SubCutaneous every morning  insulin lispro (ADMELOG) corrective regimen sliding scale   SubCutaneous every 6 hours  insulin regular  human recombinant 11 Unit(s) SubCutaneous <User Schedule>  insulin regular  human recombinant 24 Unit(s) SubCutaneous <User Schedule>  insulin regular  human recombinant 9 Unit(s) SubCutaneous <User Schedule>  levothyroxine 125 MICROGram(s) Oral <User Schedule>  predniSONE   Tablet 5 milliGRAM(s) Oral daily      PHYSICAL EXAM:  VITALS: T(C): 36.7 (12-20-23 @ 12:03)  T(F): 98.1 (12-20-23 @ 12:03), Max: 102 (12-19-23 @ 21:21)  HR: 101 (12-20-23 @ 17:36) (76 - 111)  BP: 137/72 (12-20-23 @ 12:03) (94/44 - 138/90)  RR:  (12 - 26)  SpO2:  (97% - 100%)  Wt(kg): --  GENERAL: Female laying on bed in NAD. HHA at bedside  HEENT: Trach in place D&I  Abdomen: Soft, nontender, non distended. PEG in place with TFs going at 60cc/hr at time of visit  Extremities: Warm, no edema in all 4 exts  NEURO: Awake, not nodding to questions today    LABS:  POCT Blood Glucose.: 190 mg/dL (12-20-23 @ 17:37)  POCT Blood Glucose.: 265 mg/dL (12-20-23 @ 12:26)  POCT Blood Glucose.: 146 mg/dL (12-20-23 @ 05:18)  POCT Blood Glucose.: 237 mg/dL (12-19-23 @ 23:48)  POCT Blood Glucose.: 181 mg/dL (12-19-23 @ 17:36)  POCT Blood Glucose.: 168 mg/dL (12-19-23 @ 11:33)  POCT Blood Glucose.: 151 mg/dL (12-19-23 @ 06:10)  POCT Blood Glucose.: 159 mg/dL (12-19-23 @ 00:32)  POCT Blood Glucose.: 150 mg/dL (12-18-23 @ 17:34)  POCT Blood Glucose.: 147 mg/dL (12-18-23 @ 12:19)  POCT Blood Glucose.: 158 mg/dL (12-18-23 @ 05:58)  POCT Blood Glucose.: 138 mg/dL (12-18-23 @ 00:36)                            8.6    10.20 )-----------( 94       ( 20 Dec 2023 04:13 )             28.2       12-20    135  |  97  |  19  ----------------------------<  166<H>  3.5   |  28  |  <0.30<L>    eGFR: 113    Ca    9.5      12-20  Mg     1.6     12-18  Phos  4.2     12-18      Thyroid Function Tests:  12-04 @ 07:01 TSH 1.57 FreeT4 -- T3 60 Anti TPO -- Anti Thyroglobulin Ab -- TSI --  11-28 @ 06:37 TSH 2.80 FreeT4 1.2 T3 -- Anti TPO -- Anti Thyroglobulin Ab -- TSI --      A1C with Estimated Average Glucose Result: 7.5 % (10-28-23 @ 07:18)      Estimated Average Glucose: 169 mg/dL (10-28-23 @ 07:18)        11-25 Chol -- Direct LDL -- LDL calculated -- HDL -- Trig 192<H>                 DIABETES FOLLOW UP NOTE: Saw pt earlier today    Chief Complaint: Endocrine consult requested for management of T2DM    INTERVAL HX: Pt stable, alert> opened eyes but not answering  to questions today. Per staff, pt tolerating TFs of [employer] glucose support 1.2 at 60cc/hr from 6am to 2am with BG levels mostly at goal without hypoglycemia. On Prednisone 5mg. Noted one BG >200s at 12noon today but improved for 6pm reading. Per primary team pt had fever overnight.           Review of Systems:  General: As above  Unable      Allergies    penicillin (Unknown)  heparin (Unknown)  Tagamet (Unknown)  pineapple (Unknown)  walnut (Unknown)  Pecan, Filbert, Hazelnut (Unknown)  Lyrica (Unknown)    Intolerances      MEDICATIONS:  insulin glargine Injectable (LANTUS) 18 Unit(s) SubCutaneous every morning  insulin lispro (ADMELOG) corrective regimen sliding scale   SubCutaneous every 6 hours  insulin regular  human recombinant 11 Unit(s) SubCutaneous <User Schedule>  insulin regular  human recombinant 24 Unit(s) SubCutaneous <User Schedule>  insulin regular  human recombinant 9 Unit(s) SubCutaneous <User Schedule>  levothyroxine 125 MICROGram(s) Oral <User Schedule>  predniSONE   Tablet 5 milliGRAM(s) Oral daily      PHYSICAL EXAM:  VITALS: T(C): 36.7 (12-20-23 @ 12:03)  T(F): 98.1 (12-20-23 @ 12:03), Max: 102 (12-19-23 @ 21:21)  HR: 101 (12-20-23 @ 17:36) (76 - 111)  BP: 137/72 (12-20-23 @ 12:03) (94/44 - 138/90)  RR:  (12 - 26)  SpO2:  (97% - 100%)  Wt(kg): --  GENERAL: Female laying on bed in NAD. HHA at bedside  HEENT: Trach in place D&I  Abdomen: Soft, nontender, non distended. PEG in place with TFs going at 60cc/hr at time of visit  Extremities: Warm, no edema in all 4 exts  NEURO: Awake, not nodding to questions today    LABS:  POCT Blood Glucose.: 190 mg/dL (12-20-23 @ 17:37)  POCT Blood Glucose.: 265 mg/dL (12-20-23 @ 12:26)  POCT Blood Glucose.: 146 mg/dL (12-20-23 @ 05:18)  POCT Blood Glucose.: 237 mg/dL (12-19-23 @ 23:48)  POCT Blood Glucose.: 181 mg/dL (12-19-23 @ 17:36)  POCT Blood Glucose.: 168 mg/dL (12-19-23 @ 11:33)  POCT Blood Glucose.: 151 mg/dL (12-19-23 @ 06:10)  POCT Blood Glucose.: 159 mg/dL (12-19-23 @ 00:32)  POCT Blood Glucose.: 150 mg/dL (12-18-23 @ 17:34)  POCT Blood Glucose.: 147 mg/dL (12-18-23 @ 12:19)  POCT Blood Glucose.: 158 mg/dL (12-18-23 @ 05:58)  POCT Blood Glucose.: 138 mg/dL (12-18-23 @ 00:36)                            8.6    10.20 )-----------( 94       ( 20 Dec 2023 04:13 )             28.2       12-20    135  |  97  |  19  ----------------------------<  166<H>  3.5   |  28  |  <0.30<L>    eGFR: 113    Ca    9.5      12-20  Mg     1.6     12-18  Phos  4.2     12-18      Thyroid Function Tests:  12-04 @ 07:01 TSH 1.57 FreeT4 -- T3 60 Anti TPO -- Anti Thyroglobulin Ab -- TSI --  11-28 @ 06:37 TSH 2.80 FreeT4 1.2 T3 -- Anti TPO -- Anti Thyroglobulin Ab -- TSI --      A1C with Estimated Average Glucose Result: 7.5 % (10-28-23 @ 07:18)      Estimated Average Glucose: 169 mg/dL (10-28-23 @ 07:18)        11-25 Chol -- Direct LDL -- LDL calculated -- HDL -- Trig 192<H>

## 2023-12-20 NOTE — PROGRESS NOTE ADULT - SUBJECTIVE AND OBJECTIVE BOX
[facility]-- WOUND TEAM -- FOLLOW UP NOTE  --------------------------------------------------------------------------------    24 hour events/subjective:    afebrile  more alert  tolerating TF w/o vomiting  tolerating dressings     no odor pain excess drainage  incontinent-       Diet:  Diet, NPO with Tube Feed:   Tube Feeding Modality: Gastrostomy  [employer] glucose support 1.2  Total Volume for 24 Hours (mL): 1200  Continuous  Starting Tube Feed Rate mL per Hour: 60  Increase Tube Feed Rate by (mL): 0  Until Goal Tube Feed Rate (mL per Hour): 60  Tube Feed Duration (in Hours): 20  Tube Feed Start Time: 06:00  Tube Feed Stop Time: 02:00  Free Water Flush  Pump   Rate (mL per Hour): 10   Frequency: Every 2 Hours  Alfred(7 Gm Arginine/7 Gm Glut/1.2 Gm HMB     Qty per Day:  2 (11-29-23 @ 14:12)      ROS: pt unable to offer    ALLERGIES & MEDICATIONS  --------------------------------------------------------------------------------  Allergies  penicillin (Unknown)  heparin (Unknown)  Tagamet (Unknown)  pineapple (Unknown)  walnut (Unknown)  Andressa Rawls Hazelcharlene (Unknown)  Lyrica (Unknown)      STANDING INPATIENT MEDICATIONS  albuterol/ipratropium for Nebulization 3 milliLiter(s) Nebulizer every 6 hours  amLODIPine   Tablet 10 milliGRAM(s) Oral daily  artificial  tears Solution 2 Drop(s) Both EYES four times a day  ascorbic acid 500 milliGRAM(s) Oral daily  calcium carbonate    500 mG (Tums) Chewable 1 Tablet(s) Chew every 12 hours  chlorhexidine 0.12% Liquid 15 milliLiter(s) Oral Mucosa every 12 hours  chlorhexidine 4% Liquid 1 Application(s) Topical <User Schedule>  dextrose 50% Injectable 12.5 Gram(s) IV Push once  dextrose 50% Injectable 25 Gram(s) IV Push once  dextrose 50% Injectable 50 milliLiter(s) IV Push once  escitalopram 10 milliGRAM(s) Oral <User Schedule>  fentaNYL   Patch  25 MICROgram(s)/Hr 1 Patch Transdermal every 72 hours  gabapentin Solution 100 milliGRAM(s) Enteral Tube at bedtime  glucagon  Injectable 1 milliGRAM(s) IntraMuscular once  guaiFENesin Oral Liquid (Sugar-Free) 200 milliGRAM(s) Oral every 6 hours  hemorrhoidal Ointment      hemorrhoidal Ointment 1 Application(s) Rectal daily  insulin glargine Injectable (LANTUS) 18 Unit(s) SubCutaneous every morning  insulin lispro (ADMELOG) corrective regimen sliding scale   SubCutaneous every 6 hours  insulin regular  human recombinant 24 Unit(s) SubCutaneous <User Schedule>  insulin regular  human recombinant 9 Unit(s) SubCutaneous <User Schedule>  insulin regular  human recombinant 11 Unit(s) SubCutaneous <User Schedule>  levothyroxine 125 MICROGram(s) Oral <User Schedule>  lidocaine   4% Patch 1 Patch Transdermal daily  lidocaine 2% Viscous 5 milliLiter(s) Mucosal two times a day  melatonin 5 milliGRAM(s) Oral at bedtime  melatonin 1 milliGRAM(s) Oral at bedtime  multivitamin/minerals/iron Oral Solution (CENTRUM) 15 milliLiter(s) Oral daily  nystatin Powder 1 Application(s) Topical every 8 hours  pantoprazole   Suspension 40 milliGRAM(s) Enteral Tube <User Schedule>  petrolatum Ophthalmic Ointment 1 Application(s) Both EYES four times a day  predniSONE   Tablet 5 milliGRAM(s) Oral daily  QUEtiapine 12.5 milliGRAM(s) Oral <User Schedule>  silver nitrate Applicator 1 Application(s) Topical once  simethicone 80 milliGRAM(s) Chew four times a day  witch hazel Pads 1 Application(s) Topical four times a day  zinc oxide 40% Paste 1 Application(s) Topical daily      PRN INPATIENT MEDICATION  acetaminophen   Oral Liquid .. 1000 milliGRAM(s) Enteral Tube every 6 hours PRN  benzocaine 20% Spray 1 Spray(s) Topical four times a day PRN  diclofenac sodium 1% Gel 2 Gram(s) Topical four times a day PRN  diphenhydrAMINE Elixir 25 milliGRAM(s) Oral every 6 hours PRN  guaifenesin/dextromethorphan Oral Liquid 10 milliLiter(s) Oral every 6 hours PRN  oxyCODONE    Solution 2.5 milliGRAM(s) Oral every 6 hours PRN  sodium chloride 0.65% Nasal 1 Spray(s) Both Nostrils every 6 hours PRN        VITALS/PHYSICAL EXAM  --------------------------------------------------------------------------------  T(C): 36.7 (12-20-23 @ 12:03), Max: 38.9 (12-19-23 @ 21:21)  HR: 97 (12-20-23 @ 15:54) (76 - 111)  BP: 137/72 (12-20-23 @ 12:03) (94/44 - 138/90)  RR: 12 (12-20-23 @ 06:00) (12 - 26)  SpO2: 100% (12-20-23 @ 15:54) (97% - 100%)  Wt(kg): --    NAD,  Alert, frail,  WD/ WN/ WG  Total Care Sport bed  HEENT:  NC/AT, EOMI, sclera clear, mucosa moist, throat clear, trach       w/o secretions or peritrach skin irration  Gastrointestinal: soft NT/ND (+)PEG w/o drainage or skin irration  : (+) primafit  Neurology:  nonverbal, no follow commands, paraplegic  Psych: calm/ appropriate  Musculoskeletal:  pROM, no deformities/ contractures   Vascular: BLE equally warm,  no cyanosis, clubbing nor acute ischemia  Skin:  moist w/ good turgor  Sacral stage 4 pressure injury  4cm x 2cm x 0.5cm   moist granular wound bed   palpable but not exposed bone  no blistering   (+) serosanguinous drainage  No odor, erythema, increased warmth, tenderness, induration, fluctuance, nor crepitus          LABS/ CULTURES/ RADIOLOGY:              8.6    10.20 >-----------<  94       [12-20-23 @ 04:13]              28.2     135  |  97  |  19  ----------------------------<  166      [12-20-23 @ 04:13]  3.5   |  28  |  <0.30        Ca     9.5     [12-20-23 @ 04:13]        A1C with Estimated Average Glucose Result: 7.5 % (10-28-23 @ 07:18)    CAPILLARY BLOOD GLUCOSE  POCT Blood Glucose.: 265 mg/dL (20 Dec 2023 12:26)  POCT Blood Glucose.: 146 mg/dL (20 Dec 2023 05:18)  POCT Blood Glucose.: 237 mg/dL (19 Dec 2023 23:48)  POCT Blood Glucose.: 181 mg/dL (19 Dec 2023 17:36)      Culture - Blood (collected 12-15-23 @ 06:42)  Source: .Blood Blood-Peripheral  Final Report (12-20-23 @ 11:00):    No growth at 5 days    Culture - Blood (collected 12-14-23 @ 19:09)  Source: .Blood Blood-Peripheral  Final Report (12-19-23 @ 22:01):    No growth at 5 days   [facility]-- WOUND TEAM -- FOLLOW UP NOTE  --------------------------------------------------------------------------------    24 hour events/subjective:    afebrile  more alert  tolerating TF w/o vomiting  tolerating dressings     no odor pain excess drainage  incontinent-       Diet:  Diet, NPO with Tube Feed:   Tube Feeding Modality: Gastrostomy  [employer] glucose support 1.2  Total Volume for 24 Hours (mL): 1200  Continuous  Starting Tube Feed Rate mL per Hour: 60  Increase Tube Feed Rate by (mL): 0  Until Goal Tube Feed Rate (mL per Hour): 60  Tube Feed Duration (in Hours): 20  Tube Feed Start Time: 06:00  Tube Feed Stop Time: 02:00  Free Water Flush  Pump   Rate (mL per Hour): 10   Frequency: Every 2 Hours  Alfred(7 Gm Arginine/7 Gm Glut/1.2 Gm HMB     Qty per Day:  2 (11-29-23 @ 14:12)      ROS: pt unable to offer    ALLERGIES & MEDICATIONS  --------------------------------------------------------------------------------  Allergies  penicillin (Unknown)  heparin (Unknown)  Tagamet (Unknown)  pineapple (Unknown)  walnut (Unknown)  Andressa Rawls Hazelcharlene (Unknown)  Lyrica (Unknown)      STANDING INPATIENT MEDICATIONS  albuterol/ipratropium for Nebulization 3 milliLiter(s) Nebulizer every 6 hours  amLODIPine   Tablet 10 milliGRAM(s) Oral daily  artificial  tears Solution 2 Drop(s) Both EYES four times a day  ascorbic acid 500 milliGRAM(s) Oral daily  calcium carbonate    500 mG (Tums) Chewable 1 Tablet(s) Chew every 12 hours  chlorhexidine 0.12% Liquid 15 milliLiter(s) Oral Mucosa every 12 hours  chlorhexidine 4% Liquid 1 Application(s) Topical <User Schedule>  dextrose 50% Injectable 12.5 Gram(s) IV Push once  dextrose 50% Injectable 25 Gram(s) IV Push once  dextrose 50% Injectable 50 milliLiter(s) IV Push once  escitalopram 10 milliGRAM(s) Oral <User Schedule>  fentaNYL   Patch  25 MICROgram(s)/Hr 1 Patch Transdermal every 72 hours  gabapentin Solution 100 milliGRAM(s) Enteral Tube at bedtime  glucagon  Injectable 1 milliGRAM(s) IntraMuscular once  guaiFENesin Oral Liquid (Sugar-Free) 200 milliGRAM(s) Oral every 6 hours  hemorrhoidal Ointment      hemorrhoidal Ointment 1 Application(s) Rectal daily  insulin glargine Injectable (LANTUS) 18 Unit(s) SubCutaneous every morning  insulin lispro (ADMELOG) corrective regimen sliding scale   SubCutaneous every 6 hours  insulin regular  human recombinant 24 Unit(s) SubCutaneous <User Schedule>  insulin regular  human recombinant 9 Unit(s) SubCutaneous <User Schedule>  insulin regular  human recombinant 11 Unit(s) SubCutaneous <User Schedule>  levothyroxine 125 MICROGram(s) Oral <User Schedule>  lidocaine   4% Patch 1 Patch Transdermal daily  lidocaine 2% Viscous 5 milliLiter(s) Mucosal two times a day  melatonin 5 milliGRAM(s) Oral at bedtime  melatonin 1 milliGRAM(s) Oral at bedtime  multivitamin/minerals/iron Oral Solution (CENTRUM) 15 milliLiter(s) Oral daily  nystatin Powder 1 Application(s) Topical every 8 hours  pantoprazole   Suspension 40 milliGRAM(s) Enteral Tube <User Schedule>  petrolatum Ophthalmic Ointment 1 Application(s) Both EYES four times a day  predniSONE   Tablet 5 milliGRAM(s) Oral daily  QUEtiapine 12.5 milliGRAM(s) Oral <User Schedule>  silver nitrate Applicator 1 Application(s) Topical once  simethicone 80 milliGRAM(s) Chew four times a day  witch hazel Pads 1 Application(s) Topical four times a day  zinc oxide 40% Paste 1 Application(s) Topical daily      PRN INPATIENT MEDICATION  acetaminophen   Oral Liquid .. 1000 milliGRAM(s) Enteral Tube every 6 hours PRN  benzocaine 20% Spray 1 Spray(s) Topical four times a day PRN  diclofenac sodium 1% Gel 2 Gram(s) Topical four times a day PRN  diphenhydrAMINE Elixir 25 milliGRAM(s) Oral every 6 hours PRN  guaifenesin/dextromethorphan Oral Liquid 10 milliLiter(s) Oral every 6 hours PRN  oxyCODONE    Solution 2.5 milliGRAM(s) Oral every 6 hours PRN  sodium chloride 0.65% Nasal 1 Spray(s) Both Nostrils every 6 hours PRN        VITALS/PHYSICAL EXAM  --------------------------------------------------------------------------------  T(C): 36.7 (12-20-23 @ 12:03), Max: 38.9 (12-19-23 @ 21:21)  HR: 97 (12-20-23 @ 15:54) (76 - 111)  BP: 137/72 (12-20-23 @ 12:03) (94/44 - 138/90)  RR: 12 (12-20-23 @ 06:00) (12 - 26)  SpO2: 100% (12-20-23 @ 15:54) (97% - 100%)  Wt(kg): --    NAD,  Alert, frail,  WD/ WN/ WG  Total Care Sport bed  HEENT:  NC/AT, EOMI, sclera clear, mucosa moist, throat clear, trach       w/o secretions or peritrach skin irration  Gastrointestinal: soft NT/ND (+)PEG w/o drainage or skin irration  : (+) primafit  Neurology:  nonverbal, no follow commands, paraplegic  Psych: calm/ appropriate  Musculoskeletal:  pROM, no deformities/ contractures   Vascular: BLE equally warm,  no cyanosis, clubbing nor acute ischemia  Skin:  moist w/ good turgor  Sacral stage 4 pressure injury  4cm x 2cm x 0.5cm   moist granular wound bed   palpable but not exposed bone  no blistering   (+) serosanguinous drainage  No odor, erythema, increased warmth, tenderness, induration, fluctuance, nor crepitus          LABS/ CULTURES/ RADIOLOGY:              8.6    10.20 >-----------<  94       [12-20-23 @ 04:13]              28.2     135  |  97  |  19  ----------------------------<  166      [12-20-23 @ 04:13]  3.5   |  28  |  <0.30        Ca     9.5     [12-20-23 @ 04:13]        A1C with Estimated Average Glucose Result: 7.5 % (10-28-23 @ 07:18)    CAPILLARY BLOOD GLUCOSE  POCT Blood Glucose.: 265 mg/dL (20 Dec 2023 12:26)  POCT Blood Glucose.: 146 mg/dL (20 Dec 2023 05:18)  POCT Blood Glucose.: 237 mg/dL (19 Dec 2023 23:48)  POCT Blood Glucose.: 181 mg/dL (19 Dec 2023 17:36)      Culture - Blood (collected 12-15-23 @ 06:42)  Source: .Blood Blood-Peripheral  Final Report (12-20-23 @ 11:00):    No growth at 5 days    Culture - Blood (collected 12-14-23 @ 19:09)  Source: .Blood Blood-Peripheral  Final Report (12-19-23 @ 22:01):    No growth at 5 days

## 2023-12-21 LAB
-  AZTREONAM: SIGNIFICANT CHANGE UP
-  AZTREONAM: SIGNIFICANT CHANGE UP
-  CEFEPIME: SIGNIFICANT CHANGE UP
-  CEFEPIME: SIGNIFICANT CHANGE UP
-  CEFTAZIDIME: SIGNIFICANT CHANGE UP
-  CEFTAZIDIME: SIGNIFICANT CHANGE UP
-  CIPROFLOXACIN: SIGNIFICANT CHANGE UP
-  CIPROFLOXACIN: SIGNIFICANT CHANGE UP
-  IMIPENEM: SIGNIFICANT CHANGE UP
-  IMIPENEM: SIGNIFICANT CHANGE UP
-  LEVOFLOXACIN: SIGNIFICANT CHANGE UP
-  LEVOFLOXACIN: SIGNIFICANT CHANGE UP
-  MEROPENEM: SIGNIFICANT CHANGE UP
-  MEROPENEM: SIGNIFICANT CHANGE UP
-  PIPERACILLIN/TAZOBACTAM: SIGNIFICANT CHANGE UP
-  PIPERACILLIN/TAZOBACTAM: SIGNIFICANT CHANGE UP
CULTURE RESULTS: ABNORMAL
CULTURE RESULTS: ABNORMAL
GLUCOSE BLDC GLUCOMTR-MCNC: 165 MG/DL — HIGH (ref 70–99)
GLUCOSE BLDC GLUCOMTR-MCNC: 165 MG/DL — HIGH (ref 70–99)
GLUCOSE BLDC GLUCOMTR-MCNC: 183 MG/DL — HIGH (ref 70–99)
GLUCOSE BLDC GLUCOMTR-MCNC: 183 MG/DL — HIGH (ref 70–99)
GLUCOSE BLDC GLUCOMTR-MCNC: 237 MG/DL — HIGH (ref 70–99)
GLUCOSE BLDC GLUCOMTR-MCNC: 237 MG/DL — HIGH (ref 70–99)
GLUCOSE BLDC GLUCOMTR-MCNC: 253 MG/DL — HIGH (ref 70–99)
GLUCOSE BLDC GLUCOMTR-MCNC: 253 MG/DL — HIGH (ref 70–99)
METHOD TYPE: SIGNIFICANT CHANGE UP
METHOD TYPE: SIGNIFICANT CHANGE UP
ORGANISM # SPEC MICROSCOPIC CNT: ABNORMAL
S PYO DNA THROAT QL NAA+PROBE: SIGNIFICANT CHANGE UP
S PYO DNA THROAT QL NAA+PROBE: SIGNIFICANT CHANGE UP
SPECIMEN SOURCE: SIGNIFICANT CHANGE UP
SPECIMEN SOURCE: SIGNIFICANT CHANGE UP

## 2023-12-21 PROCEDURE — 99233 SBSQ HOSP IP/OBS HIGH 50: CPT

## 2023-12-21 PROCEDURE — 99232 SBSQ HOSP IP/OBS MODERATE 35: CPT

## 2023-12-21 RX ORDER — INSULIN HUMAN 100 [IU]/ML
25 INJECTION, SOLUTION SUBCUTANEOUS
Refills: 0 | Status: DISCONTINUED | OUTPATIENT
Start: 2023-12-22 | End: 2023-12-22

## 2023-12-21 RX ADMIN — QUETIAPINE FUMARATE 12.5 MILLIGRAM(S): 200 TABLET, FILM COATED ORAL at 17:23

## 2023-12-21 RX ADMIN — Medication 200 MILLIGRAM(S): at 05:55

## 2023-12-21 RX ADMIN — Medication 1: at 23:59

## 2023-12-21 RX ADMIN — Medication 3 MILLILITER(S): at 23:12

## 2023-12-21 RX ADMIN — INSULIN HUMAN 24 UNIT(S): 100 INJECTION, SOLUTION SUBCUTANEOUS at 06:11

## 2023-12-21 RX ADMIN — Medication 1: at 17:57

## 2023-12-21 RX ADMIN — LIDOCAINE 1 PATCH: 4 CREAM TOPICAL at 12:09

## 2023-12-21 RX ADMIN — Medication 5 MILLIGRAM(S): at 21:13

## 2023-12-21 RX ADMIN — Medication 2 DROP(S): at 05:54

## 2023-12-21 RX ADMIN — SIMETHICONE 80 MILLIGRAM(S): 80 TABLET, CHEWABLE ORAL at 17:27

## 2023-12-21 RX ADMIN — Medication 1 APPLICATION(S): at 05:54

## 2023-12-21 RX ADMIN — LIDOCAINE 1 PATCH: 4 CREAM TOPICAL at 21:08

## 2023-12-21 RX ADMIN — Medication 1 TABLET(S): at 17:23

## 2023-12-21 RX ADMIN — PHENYLEPHRINE-SHARK LIVER OIL-MINERAL OIL-PETROLATUM RECTAL OINTMENT 1 APPLICATION(S): at 12:12

## 2023-12-21 RX ADMIN — LIDOCAINE 1 PATCH: 4 CREAM TOPICAL at 23:43

## 2023-12-21 RX ADMIN — NYSTATIN CREAM 1 APPLICATION(S): 100000 CREAM TOPICAL at 21:13

## 2023-12-21 RX ADMIN — Medication 3 MILLILITER(S): at 05:41

## 2023-12-21 RX ADMIN — INSULIN HUMAN 11 UNIT(S): 100 INJECTION, SOLUTION SUBCUTANEOUS at 12:13

## 2023-12-21 RX ADMIN — Medication 1 TABLET(S): at 05:54

## 2023-12-21 RX ADMIN — Medication 125 MICROGRAM(S): at 04:20

## 2023-12-21 RX ADMIN — Medication 1 MILLIGRAM(S): at 21:13

## 2023-12-21 RX ADMIN — CHLORHEXIDINE GLUCONATE 15 MILLILITER(S): 213 SOLUTION TOPICAL at 05:54

## 2023-12-21 RX ADMIN — Medication 3 MILLILITER(S): at 11:39

## 2023-12-21 RX ADMIN — Medication 1 APPLICATION(S): at 23:47

## 2023-12-21 RX ADMIN — Medication 1 APPLICATION(S): at 12:11

## 2023-12-21 RX ADMIN — NYSTATIN CREAM 1 APPLICATION(S): 100000 CREAM TOPICAL at 05:55

## 2023-12-21 RX ADMIN — SIMETHICONE 80 MILLIGRAM(S): 80 TABLET, CHEWABLE ORAL at 23:47

## 2023-12-21 RX ADMIN — LIDOCAINE 5 MILLILITER(S): 4 CREAM TOPICAL at 17:22

## 2023-12-21 RX ADMIN — Medication 500 MILLIGRAM(S): at 12:11

## 2023-12-21 RX ADMIN — NYSTATIN CREAM 1 APPLICATION(S): 100000 CREAM TOPICAL at 17:21

## 2023-12-21 RX ADMIN — AER TRAVELER 1 APPLICATION(S): 0.5 SOLUTION RECTAL; TOPICAL at 05:55

## 2023-12-21 RX ADMIN — Medication 1000 MILLIGRAM(S): at 05:00

## 2023-12-21 RX ADMIN — ESCITALOPRAM OXALATE 10 MILLIGRAM(S): 10 TABLET, FILM COATED ORAL at 08:01

## 2023-12-21 RX ADMIN — Medication 2 DROP(S): at 00:00

## 2023-12-21 RX ADMIN — Medication 2 DROP(S): at 17:25

## 2023-12-21 RX ADMIN — AER TRAVELER 1 APPLICATION(S): 0.5 SOLUTION RECTAL; TOPICAL at 00:00

## 2023-12-21 RX ADMIN — INSULIN HUMAN 9 UNIT(S): 100 INJECTION, SOLUTION SUBCUTANEOUS at 17:56

## 2023-12-21 RX ADMIN — Medication 2: at 12:12

## 2023-12-21 RX ADMIN — LIDOCAINE 5 MILLILITER(S): 4 CREAM TOPICAL at 05:53

## 2023-12-21 RX ADMIN — Medication 3 MILLILITER(S): at 17:11

## 2023-12-21 RX ADMIN — Medication 1 APPLICATION(S): at 17:22

## 2023-12-21 RX ADMIN — Medication 15 MILLILITER(S): at 12:10

## 2023-12-21 RX ADMIN — PANTOPRAZOLE SODIUM 40 MILLIGRAM(S): 20 TABLET, DELAYED RELEASE ORAL at 08:01

## 2023-12-21 RX ADMIN — Medication 1000 MILLIGRAM(S): at 04:19

## 2023-12-21 RX ADMIN — SIMETHICONE 80 MILLIGRAM(S): 80 TABLET, CHEWABLE ORAL at 12:10

## 2023-12-21 RX ADMIN — AMLODIPINE BESYLATE 10 MILLIGRAM(S): 2.5 TABLET ORAL at 05:54

## 2023-12-21 RX ADMIN — CHLORHEXIDINE GLUCONATE 1 APPLICATION(S): 213 SOLUTION TOPICAL at 05:55

## 2023-12-21 RX ADMIN — INSULIN GLARGINE 18 UNIT(S): 100 INJECTION, SOLUTION SUBCUTANEOUS at 08:01

## 2023-12-21 RX ADMIN — ZINC OXIDE 1 APPLICATION(S): 200 OINTMENT TOPICAL at 17:24

## 2023-12-21 RX ADMIN — GABAPENTIN 100 MILLIGRAM(S): 400 CAPSULE ORAL at 21:13

## 2023-12-21 RX ADMIN — Medication 2 DROP(S): at 12:11

## 2023-12-21 RX ADMIN — SIMETHICONE 80 MILLIGRAM(S): 80 TABLET, CHEWABLE ORAL at 05:54

## 2023-12-21 RX ADMIN — Medication 5 MILLIGRAM(S): at 05:54

## 2023-12-21 RX ADMIN — CHLORHEXIDINE GLUCONATE 15 MILLILITER(S): 213 SOLUTION TOPICAL at 17:25

## 2023-12-21 RX ADMIN — Medication 2 DROP(S): at 23:47

## 2023-12-21 RX ADMIN — Medication 3: at 06:10

## 2023-12-21 NOTE — PROGRESS NOTE ADULT - SUBJECTIVE AND OBJECTIVE BOX
Patient is a 73y old  Female who presents with a chief complaint of PEG bleeding (20 Dec 2023 17:57)      Interval Events:    REVIEW OF SYSTEMS:  [ ] Positive  [ ] All other systems negative  [ ] Unable to assess ROS because ________    Vital Signs Last 24 Hrs  T(C): 37.1 (12-21-23 @ 06:16), Max: 37.9 (12-20-23 @ 22:00)  T(F): 98.7 (12-21-23 @ 06:16), Max: 100.2 (12-20-23 @ 22:00)  HR: 88 (12-21-23 @ 06:16) (80 - 108)  BP: 110/48 (12-21-23 @ 06:16) (110/48 - 142/66)  RR: 12 (12-21-23 @ 06:16) (12 - 20)  SpO2: 100% (12-21-23 @ 06:16) (98% - 100%)    PHYSICAL EXAM:  HEENT:   [ ]Tracheostomy:  [ ]Pupils equal  [ ]No oral lesions  [ ]Abnormal    SKIN  [ ]No Rash  [ ] Abnormal  [ ] pressure    CARDIAC  [ ]Regular  [ ]Abnormal    PULMONARY  [ ]Bilateral Clear Breath Sounds  [ ]Normal Excursion  [ ]Abnormal    GI  [ ]PEG      [ ] +BS		              [ ]Soft, nondistended, nontender	  [ ]Abnormal    MUSCULOSKELETAL                                   [ ]Bedbound                 [ ]Abnormal    [ ]Ambulatory/OOB to chair                           EXTREMITIES                                         [ ]Normal  [ ]Edema                           NEUROLOGIC  [ ] Normal, non focal  [ ] Focal findings:    PSYCHIATRIC  [ ]Alert and appropriate  [ ] Sedated	 [ ]Agitated    :  Mcbride: [ ] Yes, if yes: Date of Placement:                   [  ] No    LINES: Central Lines [ ] Yes, if yes: Date of Placement                                     [  ] No    HOSPITAL MEDICATIONS:  MEDICATIONS  (STANDING):  albuterol/ipratropium for Nebulization 3 milliLiter(s) Nebulizer every 6 hours  amLODIPine   Tablet 10 milliGRAM(s) Oral daily  artificial  tears Solution 2 Drop(s) Both EYES four times a day  ascorbic acid 500 milliGRAM(s) Oral daily  calcium carbonate    500 mG (Tums) Chewable 1 Tablet(s) Chew every 12 hours  chlorhexidine 0.12% Liquid 15 milliLiter(s) Oral Mucosa every 12 hours  chlorhexidine 4% Liquid 1 Application(s) Topical <User Schedule>  dextrose 50% Injectable 12.5 Gram(s) IV Push once  dextrose 50% Injectable 50 milliLiter(s) IV Push once  dextrose 50% Injectable 25 Gram(s) IV Push once  escitalopram 10 milliGRAM(s) Oral <User Schedule>  fentaNYL   Patch  25 MICROgram(s)/Hr 1 Patch Transdermal every 72 hours  gabapentin Solution 100 milliGRAM(s) Enteral Tube at bedtime  glucagon  Injectable 1 milliGRAM(s) IntraMuscular once  guaiFENesin Oral Liquid (Sugar-Free) 200 milliGRAM(s) Oral every 6 hours  hemorrhoidal Ointment      hemorrhoidal Ointment 1 Application(s) Rectal daily  insulin glargine Injectable (LANTUS) 18 Unit(s) SubCutaneous every morning  insulin lispro (ADMELOG) corrective regimen sliding scale   SubCutaneous every 6 hours  insulin regular  human recombinant 24 Unit(s) SubCutaneous <User Schedule>  insulin regular  human recombinant 11 Unit(s) SubCutaneous <User Schedule>  insulin regular  human recombinant 9 Unit(s) SubCutaneous <User Schedule>  levothyroxine 125 MICROGram(s) Oral <User Schedule>  lidocaine   4% Patch 1 Patch Transdermal daily  lidocaine 2% Viscous 5 milliLiter(s) Mucosal two times a day  melatonin 5 milliGRAM(s) Oral at bedtime  melatonin 1 milliGRAM(s) Oral at bedtime  multivitamin/minerals/iron Oral Solution (CENTRUM) 15 milliLiter(s) Oral daily  nystatin Powder 1 Application(s) Topical every 8 hours  pantoprazole   Suspension 40 milliGRAM(s) Enteral Tube <User Schedule>  petrolatum Ophthalmic Ointment 1 Application(s) Both EYES four times a day  predniSONE   Tablet 5 milliGRAM(s) Oral daily  QUEtiapine 12.5 milliGRAM(s) Oral <User Schedule>  silver nitrate Applicator 1 Application(s) Topical once  simethicone 80 milliGRAM(s) Chew four times a day  witch hazel Pads 1 Application(s) Topical four times a day  zinc oxide 40% Paste 1 Application(s) Topical daily    MEDICATIONS  (PRN):  acetaminophen   Oral Liquid .. 1000 milliGRAM(s) Enteral Tube every 6 hours PRN Temp greater or equal to 38C (100.4F), Mild Pain (1 - 3)  benzocaine 20% Spray 1 Spray(s) Topical four times a day PRN Cough  diclofenac sodium 1% Gel 2 Gram(s) Topical four times a day PRN pain  diphenhydrAMINE Elixir 25 milliGRAM(s) Oral every 6 hours PRN Rash and/or Itching  guaifenesin/dextromethorphan Oral Liquid 10 milliLiter(s) Oral every 6 hours PRN Cough  oxyCODONE    Solution 2.5 milliGRAM(s) Oral every 6 hours PRN Moderate Pain (4 - 6)  sodium chloride 0.65% Nasal 1 Spray(s) Both Nostrils every 6 hours PRN Nasal Congestion      LABS:                        8.6    10.20 )-----------( 94       ( 20 Dec 2023 04:13 )             28.2     12-20    135  |  97  |  19  ----------------------------<  166<H>  3.5   |  28  |  <0.30<L>    Ca    9.5      20 Dec 2023 04:13        Urinalysis Basic - ( 20 Dec 2023 04:13 )    Color: x / Appearance: x / SG: x / pH: x  Gluc: 166 mg/dL / Ketone: x  / Bili: x / Urobili: x   Blood: x / Protein: x / Nitrite: x   Leuk Esterase: x / RBC: x / WBC x   Sq Epi: x / Non Sq Epi: x / Bacteria: x        Venous Blood Gas:  12-20 @ 04:14  --/--/--/--/--  VBG Lactate: 1.6    CAPILLARY BLOOD GLUCOSE    MICROBIOLOGY:     RADIOLOGY:  [ ] Reviewed and interpreted by me    Mode: AC/ CMV (Assist Control/ Continuous Mandatory Ventilation)  RR (machine): 12  TV (machine): 300  FiO2: 30  PEEP: 5  ITime: 1  MAP: 10  PIP: 27   Patient is a 73y old  Female who presents with a chief complaint of PEG bleeding (20 Dec 2023 17:57)      Interval Events: No events reported over night    REVIEW OF SYSTEMS:  [X] Positive:  throat pain  [ ] All other systems negative  [ ] Unable to assess ROS because ____    Vital Signs Last 24 Hrs  T(C): 37.1 (12-21-23 @ 06:16), Max: 37.9 (12-20-23 @ 22:00)  T(F): 98.7 (12-21-23 @ 06:16), Max: 100.2 (12-20-23 @ 22:00)  HR: 88 (12-21-23 @ 06:16) (80 - 108)  BP: 110/48 (12-21-23 @ 06:16) (110/48 - 142/66)  RR: 12 (12-21-23 @ 06:16) (12 - 20)  SpO2: 100% (12-21-23 @ 06:16) (98% - 100%)      PHYSICAL EXAM:  HEENT:   [X]Tracheostomy: #8 distal XLT [place]   [X]Pupils equal  [ ]No oral lesions  [X]Abnormal - + missing teeth, + loose teeth    SKIN  [ ]No Rash  [ ] Abnormal  [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    CARDIAC  [X]Regular  [ ]Abnormal    PULMONARY  [ ]Bilateral Clear Breath Sounds  [ ]Normal Excursion  [X]Abnormal - mildly coarse BS BL    GI  [X]PEG      [X] +BS		              [X]Soft, nondistended, nontender	  [ ]Abnormal    MUSCULOSKELETAL                                   [X]Bedbound                 [ ]Abnormal    [ ]Ambulatory/OOB to chair                           EXTREMITIES                                         [X]Normal  [ ]Edema                           NEUROLOGIC  [X] Normal, non focal: opens eyes spontaneously, followings commands on all 4 extremities  [ ] Focal findings:    PSYCHIATRIC  [X]Alert and appropriate  [ ] Sedated	 [ ]Agitated    :  Mcbride: [ ] Yes, if yes: Date of Placement:                   [X] No    LINES: Central Lines [ ] Yes, if yes: Date of Placement                                     [X] No    HOSPITAL MEDICATIONS:  MEDICATIONS  (STANDING):  albuterol/ipratropium for Nebulization 3 milliLiter(s) Nebulizer every 6 hours  amLODIPine   Tablet 10 milliGRAM(s) Oral daily  artificial  tears Solution 2 Drop(s) Both EYES four times a day  ascorbic acid 500 milliGRAM(s) Oral daily  calcium carbonate    500 mG (Tums) Chewable 1 Tablet(s) Chew every 12 hours  chlorhexidine 0.12% Liquid 15 milliLiter(s) Oral Mucosa every 12 hours  chlorhexidine 4% Liquid 1 Application(s) Topical <User Schedule>  dextrose 50% Injectable 12.5 Gram(s) IV Push once  dextrose 50% Injectable 50 milliLiter(s) IV Push once  dextrose 50% Injectable 25 Gram(s) IV Push once  escitalopram 10 milliGRAM(s) Oral <User Schedule>  fentaNYL   Patch  25 MICROgram(s)/Hr 1 Patch Transdermal every 72 hours  gabapentin Solution 100 milliGRAM(s) Enteral Tube at bedtime  glucagon  Injectable 1 milliGRAM(s) IntraMuscular once  guaiFENesin Oral Liquid (Sugar-Free) 200 milliGRAM(s) Oral every 6 hours  hemorrhoidal Ointment      hemorrhoidal Ointment 1 Application(s) Rectal daily  insulin glargine Injectable (LANTUS) 18 Unit(s) SubCutaneous every morning  insulin lispro (ADMELOG) corrective regimen sliding scale   SubCutaneous every 6 hours  insulin regular  human recombinant 24 Unit(s) SubCutaneous <User Schedule>  insulin regular  human recombinant 11 Unit(s) SubCutaneous <User Schedule>  insulin regular  human recombinant 9 Unit(s) SubCutaneous <User Schedule>  levothyroxine 125 MICROGram(s) Oral <User Schedule>  lidocaine   4% Patch 1 Patch Transdermal daily  lidocaine 2% Viscous 5 milliLiter(s) Mucosal two times a day  melatonin 5 milliGRAM(s) Oral at bedtime  melatonin 1 milliGRAM(s) Oral at bedtime  multivitamin/minerals/iron Oral Solution (CENTRUM) 15 milliLiter(s) Oral daily  nystatin Powder 1 Application(s) Topical every 8 hours  pantoprazole   Suspension 40 milliGRAM(s) Enteral Tube <User Schedule>  petrolatum Ophthalmic Ointment 1 Application(s) Both EYES four times a day  predniSONE   Tablet 5 milliGRAM(s) Oral daily  QUEtiapine 12.5 milliGRAM(s) Oral <User Schedule>  silver nitrate Applicator 1 Application(s) Topical once  simethicone 80 milliGRAM(s) Chew four times a day  witch hazel Pads 1 Application(s) Topical four times a day  zinc oxide 40% Paste 1 Application(s) Topical daily    MEDICATIONS  (PRN):  acetaminophen   Oral Liquid .. 1000 milliGRAM(s) Enteral Tube every 6 hours PRN Temp greater or equal to 38C (100.4F), Mild Pain (1 - 3)  benzocaine 20% Spray 1 Spray(s) Topical four times a day PRN Cough  diclofenac sodium 1% Gel 2 Gram(s) Topical four times a day PRN pain  diphenhydrAMINE Elixir 25 milliGRAM(s) Oral every 6 hours PRN Rash and/or Itching  guaifenesin/dextromethorphan Oral Liquid 10 milliLiter(s) Oral every 6 hours PRN Cough  oxyCODONE    Solution 2.5 milliGRAM(s) Oral every 6 hours PRN Moderate Pain (4 - 6)  sodium chloride 0.65% Nasal 1 Spray(s) Both Nostrils every 6 hours PRN Nasal Congestion      LABS:                        8.6    10.20 )-----------( 94       ( 20 Dec 2023 04:13 )             28.2     12-20    135  |  97  |  19  ----------------------------<  166<H>  3.5   |  28  |  <0.30<L>    Ca    9.5      20 Dec 2023 04:13        Urinalysis Basic - ( 20 Dec 2023 04:13 )    Color: x / Appearance: x / SG: x / pH: x  Gluc: 166 mg/dL / Ketone: x  / Bili: x / Urobili: x   Blood: x / Protein: x / Nitrite: x   Leuk Esterase: x / RBC: x / WBC x   Sq Epi: x / Non Sq Epi: x / Bacteria: x        Venous Blood Gas:  12-20 @ 04:14  --/--/--/--/--  VBG Lactate: 1.6    CAPILLARY BLOOD GLUCOSE    MICROBIOLOGY:     RADIOLOGY:  [ ] Reviewed and interpreted by me    Mode: AC/ CMV (Assist Control/ Continuous Mandatory Ventilation)  RR (machine): 12  TV (machine): 300  FiO2: 30  PEEP: 5  ITime: 1  MAP: 10  PIP: 27

## 2023-12-21 NOTE — PROGRESS NOTE ADULT - SUBJECTIVE AND OBJECTIVE BOX
INTERVAL HPI/OVERNIGHT EVENTS:    Patient seen and examined  improved leakage around peg site      MEDICATIONS  (STANDING):  albuterol/ipratropium for Nebulization 3 milliLiter(s) Nebulizer every 6 hours  amLODIPine   Tablet 10 milliGRAM(s) Oral daily  artificial  tears Solution 2 Drop(s) Both EYES four times a day  ascorbic acid 500 milliGRAM(s) Oral daily  calcium carbonate    500 mG (Tums) Chewable 1 Tablet(s) Chew every 12 hours  chlorhexidine 0.12% Liquid 15 milliLiter(s) Oral Mucosa every 12 hours  chlorhexidine 4% Liquid 1 Application(s) Topical <User Schedule>  dextrose 50% Injectable 25 Gram(s) IV Push once  dextrose 50% Injectable 50 milliLiter(s) IV Push once  dextrose 50% Injectable 12.5 Gram(s) IV Push once  escitalopram 10 milliGRAM(s) Oral <User Schedule>  fentaNYL   Patch  25 MICROgram(s)/Hr 1 Patch Transdermal every 72 hours  gabapentin Solution 100 milliGRAM(s) Enteral Tube at bedtime  glucagon  Injectable 1 milliGRAM(s) IntraMuscular once  guaiFENesin Oral Liquid (Sugar-Free) 200 milliGRAM(s) Oral every 6 hours  hemorrhoidal Ointment      hemorrhoidal Ointment 1 Application(s) Rectal daily  insulin glargine Injectable (LANTUS) 18 Unit(s) SubCutaneous every morning  insulin lispro (ADMELOG) corrective regimen sliding scale   SubCutaneous every 6 hours  insulin regular  human recombinant 11 Unit(s) SubCutaneous <User Schedule>  insulin regular  human recombinant 24 Unit(s) SubCutaneous <User Schedule>  insulin regular  human recombinant 9 Unit(s) SubCutaneous <User Schedule>  levothyroxine 125 MICROGram(s) Oral <User Schedule>  lidocaine   4% Patch 1 Patch Transdermal daily  lidocaine 2% Viscous 5 milliLiter(s) Mucosal two times a day  melatonin 5 milliGRAM(s) Oral at bedtime  melatonin 1 milliGRAM(s) Oral at bedtime  multivitamin/minerals/iron Oral Solution (CENTRUM) 15 milliLiter(s) Oral daily  nystatin Powder 1 Application(s) Topical every 8 hours  pantoprazole   Suspension 40 milliGRAM(s) Enteral Tube <User Schedule>  petrolatum Ophthalmic Ointment 1 Application(s) Both EYES four times a day  predniSONE   Tablet 5 milliGRAM(s) Oral daily  QUEtiapine 12.5 milliGRAM(s) Oral <User Schedule>  silver nitrate Applicator 1 Application(s) Topical once  simethicone 80 milliGRAM(s) Chew four times a day  witch hazel Pads 1 Application(s) Topical four times a day  zinc oxide 40% Paste 1 Application(s) Topical daily    MEDICATIONS  (PRN):  acetaminophen   Oral Liquid .. 1000 milliGRAM(s) Enteral Tube every 6 hours PRN Temp greater or equal to 38C (100.4F), Mild Pain (1 - 3)  benzocaine 20% Spray 1 Spray(s) Topical four times a day PRN Cough  diclofenac sodium 1% Gel 2 Gram(s) Topical four times a day PRN pain  diphenhydrAMINE Elixir 25 milliGRAM(s) Oral every 6 hours PRN Rash and/or Itching  guaifenesin/dextromethorphan Oral Liquid 10 milliLiter(s) Oral every 6 hours PRN Cough  oxyCODONE    Solution 2.5 milliGRAM(s) Oral every 6 hours PRN Moderate Pain (4 - 6)  sodium chloride 0.65% Nasal 1 Spray(s) Both Nostrils every 6 hours PRN Nasal Congestion      Allergies    penicillin (Unknown)  heparin (Unknown)  Tagamet (Unknown)  pineapple (Unknown)  walnut (Unknown)  Pecan, Filbert, Hazelnut (Unknown)  Lyrica (Unknown)    Intolerances        Review of Systems:    unable to obtain in completion      Vital Signs Last 24 Hrs  T(C): 37.1 (21 Dec 2023 06:16), Max: 37.9 (20 Dec 2023 22:00)  T(F): 98.7 (21 Dec 2023 06:16), Max: 100.2 (20 Dec 2023 22:00)  HR: 78 (21 Dec 2023 08:55) (78 - 108)  BP: 110/48 (21 Dec 2023 06:16) (110/48 - 142/66)  BP(mean): --  RR: 12 (21 Dec 2023 06:16) (12 - 20)  SpO2: 100% (21 Dec 2023 08:55) (99% - 100%)    Parameters below as of 21 Dec 2023 06:16  Patient On (Oxygen Delivery Method): ventilator          PHYSICAL EXAM:    GENERAL:  Appears stated age, well-groomed, well-nourished, no distress  HEENT:  NC/AT,  conjunctivae anicteric, clear and pink,   NECK: supple, trachea midline  +trach  CHEST:  Full & symmetric excursion, no increased effort, breath sounds clear  HEART:  Regular rhythm, no JVD  ABDOMEN:  Soft, non-tender, non-distended, normoactive bowel sounds,  no masses , no hepatosplenomegaly  +peg  EXTREMITIES:  no cyanosis, clubbing or edema  SKIN:  No rash, erythema, or, ecchymoses, no jaundice  NEURO:  Alert, non-focal, no asterixis  PSYCH: Appropriate affect, oriented to place and time  RECTAL: Deferred      LABS:                        8.6    10.20 )-----------( 94       ( 20 Dec 2023 04:13 )             28.2     12-20    135  |  97  |  19  ----------------------------<  166<H>  3.5   |  28  |  <0.30<L>    Ca    9.5      20 Dec 2023 04:13        Urinalysis Basic - ( 20 Dec 2023 04:13 )    Color: x / Appearance: x / SG: x / pH: x  Gluc: 166 mg/dL / Ketone: x  / Bili: x / Urobili: x   Blood: x / Protein: x / Nitrite: x   Leuk Esterase: x / RBC: x / WBC x   Sq Epi: x / Non Sq Epi: x / Bacteria: x          RADIOLOGY & ADDITIONAL TESTS:

## 2023-12-21 NOTE — PROGRESS NOTE ADULT - NS ATTEND AMEND GEN_ALL_CORE FT
73F with a h/o DM, HTN, HLD, anemia, hypothyroidism, RA, fibromyalgia, remote cerebral aneurysm with repair, hypercapnic respiratory failure s/p tracheostomy/PEG, bedbound, sacral pressure ulcer. Admitted w/ bleeding from tracheostomy and PEG w/ associated abdominal pain, recent hx of auditory/visual hallucinations.   Imaging showed mild thickening along PEG tube tract and sacral ulcer extending to coccyx suggestive of acute osteomyelitis.      #Neuro - awake, alert, and interactive. moving all extremities, mouthing words. continue current medications  #Cardiovascular - hemodynamically stable  #Pulmonary - chronic hypoxemic respiratory failure, has 8XLT trach, on home vent, pressure support trials daily 15/5. No further hemoptysis after TXA nebs, Minimize suctioning as needed  #Gastro - oropharyngeal dysphagia with PEG tube in place, tolerating feeds, nutrition follow up appreciated   Appreciate GI eval of PEG site - stoma cauterized with silver nitrate 12/20.   #ID - sacral osteo on MRI - wound care follow-up appreciated, c/w offloading and local wound care  - s/p 6 week course of Cipro and Keflex as per ID - completed 12/10/23  - Per daughter, patient has had prior multiple infections during hospitalizations including prior pseudomonas, MRSA, E faecalis, and Klebsiella  - Sputum colonized with MDR Pseudomonas   Febrile O/N 12/19 - unclear etiology, UA negative, CXR and secretions unchanged. RVP and LE dopplers neg. Minimal secretions. WIll send throat culture as complaining of throat pain but otherwise does not appear infected. Hold further antibiotics. Appreciate ID follow up.   #Hem/Onc - prior cytopenias in setting of antibiotics per patient's daughter   - Hem/Onc follow-up noted - suspect an anemia of chronic disease  #Pain - continue current pain control - in setting of fibromyalgia and pain from wound  - Voltaren topical, lidocaine patches and low dose Oxycodone as needed  #Derm - previously seen by derm for skin lesions - mid back with irritated seborrheic keratosis - outpatient follow-up  #Endo - DM2 - continue insulin and adjust as needed for goal FS < 180  - Endocrine follow-up appreciated   - Continue Synthroid - TSH WNL  #DVT proph - SCDs  - Prophylactic AC stopped after concern for prior bleeding.  Discharge planning.

## 2023-12-21 NOTE — PROGRESS NOTE ADULT - ASSESSMENT
73 year old female with respiratory failure s/p trach and PEG, sacral osteomyelitis.     leakage from chronic PEG site. no signs of infection and site looks intact with some granulation tissue around the tube.  - Discussed with daughter at bedside, limited options available and I would avoid a repeat PEG.  - s/p trial topical silver nitrate to facilitate tract closure, 12/20, will monitor site  - decrease rate of tube feeds / administer TF and meds by gravity if feasible  - cont PPI    The plan of care was discussed with the physician assistant and modifications were made to the notation where appropriate.   Differential diagnosis and plan of care discussed with patient after the evaluation  35 minutes spent on total encounter of which more than fifty percent of the encounter was spent counseling and/or coordinating care by the attending physician.    [facility]  Doris Morgan M.D.   Gastroenterology and Hepatology  [address] [place], [place]  Office: [phone]  Cell: [phone]   73 year old female with respiratory failure s/p trach and PEG, sacral osteomyelitis.     leakage from chronic PEG site. no signs of infection and site looks intact with some granulation tissue around the tube.  - Discussed with daughter at bedside, limited options available and I would avoid a repeat PEG.  - s/p trial topical silver nitrate to facilitate tract closure, 12/20, will monitor site  - decrease rate of tube feeds / administer TF and meds by gravity if feasible  - cont PPI    The plan of care was discussed with the physician assistant and modifications were made to the notation where appropriate.   Differential diagnosis and plan of care discussed with patient after the evaluation  35 minutes spent on total encounter of which more than fifty percent of the encounter was spent counseling and/or coordinating care by the attending physician.    [facility]  Doris Morgan M.D.   Gastroenterology and Hepatology  [address] [place], [place]  Office: [phone]  Cell: [phone]

## 2023-12-21 NOTE — PROGRESS NOTE ADULT - PROBLEM SELECTOR PLAN 8
- s/p Home Levemir 14 units in morning held on admission as noted w/ hypoglycemia   - Enteral feed changed to [employer] diabetic formula; glucose numbers stabilizing  - adjustments made throughout admission per endocrine recs - s/p Home Levemir 14 units in morning held on admission as noted w/ hypoglycemia   - Enteral feed changed to [employer] diabetic formula; glucose numbers stabilizing  - adjustments made throughout admission per endocrine recs

## 2023-12-21 NOTE — PROGRESS NOTE ADULT - PROBLEM SELECTOR PLAN 5
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

## 2023-12-21 NOTE — PROGRESS NOTE ADULT - PROBLEM SELECTOR PLAN 3
Seen by Dental Service on 11/27 and 12/15  - initially seen to rule out infectious source - no signs on infection seen on exam  - 12/15 reconsulted for extraction - no inpatient extraction, f/u outpt  - 12/18 reached out to dental for dental attending to discuss case with pts daughter, Marysol  - 12/20 updated daughter Marysol, she is aware of discussion and decision between dental resident and attending - that there will be no extraction inpatient and she is likely not going to receive a phone call from dental attending (Dr. Lee)

## 2023-12-21 NOTE — PROGRESS NOTE ADULT - PROBLEM SELECTOR PLAN 2
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   - S/p CT abd/pelvis (10/28): Sacral decubitus ulcer extending to the coccyx with osseous remodeling and pelvic MRI or bone scan to recc to exclude osteomyelitis.  - 10/30 wound care note: Sacral stage 4 pressure injury 4.5cm x 2.5cm x 1.5cm w/ lip of undermining circumferentially greatest 9-12 of 3cm.  - MRI pelvis 10/30 with Osteomyelitis, completed Cefepime for 7 days, Vancomycin d/marli   - 11/10: resumed Cipro 500mg q 12 and Keflex 500mg q 6 until 12/10.  - 11/20: Changed to IV Cipro 400 q12 as recommended by ID pharmacist due to multiple drug interactions when given via PEG  - 11/28 wound care note: Sacral stage 4pressure injury 4.5cm x 2.5cm x 0.5cm, moist granular wound bed   palpable but not exposed bone no blistering, (+) serosanguinous drainage. No odor, erythema, increased warmth, tenderness, induration, fluctuance, nor crepitus.  - 12/3 wound culture ordered - d/c'd as no need for culture, wound improving, no signs of infection  - 12/10 completed cipro and keflex x5 week course  - wound care following

## 2023-12-21 NOTE — PROGRESS NOTE ADULT - NUTRITIONAL ASSESSMENT
Diet, NPO with Tube Feed:   Tube Feeding Modality: Gastrostomy  CasieService Route glucose support 1.2  Total Volume for 24 Hours (mL): 1200  Continuous  Starting Tube Feed Rate {mL per Hour}: 60  Increase Tube Feed Rate by (mL): 0  Until Goal Tube Feed Rate (mL per Hour): 60  Tube Feed Duration (in Hours): 20  Tube Feed Start Time: 06:00  Tube Feed Stop Time: 02:00  Free Water Flush  Pump   Rate (mL per Hour): 10   Frequency: Every 2 Hours  Alfred(7 Gm Arginine/7 Gm Glut/1.2 Gm HMB     Qty per Day:  2 (11-29-23)    EN order providing if 100% provision: 1440kcal (30kcal/kg) and 77g protein (1.6g/kg) Diet, NPO with Tube Feed:   Tube Feeding Modality: Gastrostomy  Casie"[employer] glucose support 1.2  Total Volume for 24 Hours (mL): 1200  Continuous  Starting Tube Feed Rate {mL per Hour}: 60  Increase Tube Feed Rate by (mL): 0  Until Goal Tube Feed Rate (mL per Hour): 60  Tube Feed Duration (in Hours): 20  Tube Feed Start Time: 06:00  Tube Feed Stop Time: 02:00  Free Water Flush  Pump   Rate (mL per Hour): 10   Frequency: Every 2 Hours  Alfred(7 Gm Arginine/7 Gm Glut/1.2 Gm HMB     Qty per Day:  2 (11-29-23)    EN order providing if 100% provision: 1440kcal (30kcal/kg) and 77g protein (1.6g/kg)

## 2023-12-21 NOTE — PROGRESS NOTE ADULT - SUBJECTIVE AND OBJECTIVE BOX
Seen earlier today     Chief Complaint: Diabetes Mellitus follow up    INTERVAL HX: Opens eyes. As per RN, tolerating tube feeding ( Slated glucose support 1.2) at 60cc/hr from 6am to 2am. 6 am  today and 12 noon BG elevated in 200s for 2 days.     Review of Systems:  General: As above  GI: No nausea, vomiting  Endocrine: no  S&Sx of hypoglycemia    Allergies    penicillin (Unknown)  heparin (Unknown)  Tagamet (Unknown)  pineapple (Unknown)  walnut (Unknown)  Pecan, Filbert, Hazelnut (Unknown)  Lyrica (Unknown)    Intolerances      MEDICATIONS  (STANDING):  amLODIPine   Tablet 10 milliGRAM(s) Oral daily  dextrose 50% Injectable 12.5 Gram(s) IV Push once  dextrose 50% Injectable 50 milliLiter(s) IV Push once  dextrose 50% Injectable 25 Gram(s) IV Push once  insulin glargine Injectable (LANTUS) 18 Unit(s) SubCutaneous every morning  insulin lispro (ADMELOG) corrective regimen sliding scale   SubCutaneous every 6 hours  insulin regular  human recombinant 11 Unit(s) SubCutaneous <User Schedule>  insulin regular  human recombinant 24 Unit(s) SubCutaneous <User Schedule>  insulin regular  human recombinant 9 Unit(s) SubCutaneous <User Schedule>  levothyroxine 125 MICROGram(s) Oral <User Schedule>  predniSONE   Tablet 5 milliGRAM(s) Oral daily      insulin glargine Injectable (LANTUS)   18 Unit(s) SubCutaneous (12-21-23 @ 08:01)    insulin lispro (ADMELOG) corrective regimen sliding scale   1 Unit(s) SubCutaneous (12-21-23 @ 17:57)   2 Unit(s) SubCutaneous (12-21-23 @ 12:12)   3 Unit(s) SubCutaneous (12-21-23 @ 06:10)   1 Unit(s) SubCutaneous (12-20-23 @ 23:59)    insulin regular  human recombinant   11 Unit(s) SubCutaneous (12-21-23 @ 12:13)    insulin regular  human recombinant   24 Unit(s) SubCutaneous (12-21-23 @ 06:11)    insulin regular  human recombinant   9 Unit(s) SubCutaneous (12-21-23 @ 17:56)    levothyroxine   125 MICROGram(s) Oral (12-21-23 @ 04:20)    predniSONE   Tablet   5 milliGRAM(s) Oral (12-21-23 @ 05:54)        PHYSICAL EXAM:  VITALS: T(C): 36.6 (12-21-23 @ 17:35)  T(F): 97.8 (12-21-23 @ 17:35), Max: 100.2 (12-20-23 @ 22:00)  HR: 87 (12-21-23 @ 17:35) (78 - 105)  BP: 111/64 (12-21-23 @ 17:35) (110/48 - 142/66)  RR:  (12 - 20)  SpO2:  (100% - 100%)  Wt(kg): --  GENERAL: Female laying in bed, in NAD  Respiratory: + trach   Extremities: Warm, no edema  NEURO: Open eyes to verbal command    LABS:  POCT Blood Glucose.: 165 mg/dL (12-21-23 @ 17:37)  POCT Blood Glucose.: 237 mg/dL (12-21-23 @ 11:16)  POCT Blood Glucose.: 253 mg/dL (12-21-23 @ 05:57)  POCT Blood Glucose.: 161 mg/dL (12-20-23 @ 23:46)  POCT Blood Glucose.: 190 mg/dL (12-20-23 @ 17:37)  POCT Blood Glucose.: 265 mg/dL (12-20-23 @ 12:26)  POCT Blood Glucose.: 146 mg/dL (12-20-23 @ 05:18)  POCT Blood Glucose.: 237 mg/dL (12-19-23 @ 23:48)  POCT Blood Glucose.: 181 mg/dL (12-19-23 @ 17:36)  POCT Blood Glucose.: 168 mg/dL (12-19-23 @ 11:33)  POCT Blood Glucose.: 151 mg/dL (12-19-23 @ 06:10)  POCT Blood Glucose.: 159 mg/dL (12-19-23 @ 00:32)                          8.6    10.20 )-----------( 94       ( 20 Dec 2023 04:13 )             28.2     12-20    135  |  97  |  19  ----------------------------<  166<H>  3.5   |  28  |  <0.30<L>    Ca    9.5      20 Dec 2023 04:13        11-25 Chol -- Direct LDL -- LDL calculated -- HDL -- Trig 192<H>  Thyroid Function Tests:  12-04 @ 07:01 TSH 1.57 FreeT4 -- T3 60 Anti TPO -- Anti Thyroglobulin Ab -- TSI --  11-28 @ 06:37 TSH 2.80 FreeT4 1.2 T3 -- Anti TPO -- Anti Thyroglobulin Ab -- TSI --      A1C with Estimated Average Glucose Result: 7.5 % (10-28-23 @ 07:18)    Estimated Average Glucose: 169 mg/dL (10-28-23 @ 07:18)        Diet, NPO with Tube Feed:   Tube Feeding Modality: Gastrostomy  Slated glucose support 1.2  Total Volume for 24 Hours (mL): 1200  Continuous  Starting Tube Feed Rate mL per Hour: 60  Increase Tube Feed Rate by (mL): 0  Until Goal Tube Feed Rate (mL per Hour): 60  Tube Feed Duration (in Hours): 20  Tube Feed Start Time: 06:00  Tube Feed Stop Time: 02:00  Free Water Flush  Pump   Rate (mL per Hour): 10   Frequency: Every 2 Hours  Alfred(7 Gm Arginine/7 Gm Glut/1.2 Gm HMB     Qty per Day:  2 (11-29-23 @ 14:12) [Active]             Seen earlier today     Chief Complaint: Diabetes Mellitus follow up    INTERVAL HX: Opens eyes. As per RN, tolerating tube feeding ( Ikro glucose support 1.2) at 60cc/hr from 6am to 2am. 6 am  today and 12 noon BG elevated in 200s for 2 days.     Review of Systems:  General: As above  GI: No nausea, vomiting  Endocrine: no  S&Sx of hypoglycemia    Allergies    penicillin (Unknown)  heparin (Unknown)  Tagamet (Unknown)  pineapple (Unknown)  walnut (Unknown)  Pecan, Filbert, Hazelnut (Unknown)  Lyrica (Unknown)    Intolerances      MEDICATIONS  (STANDING):  amLODIPine   Tablet 10 milliGRAM(s) Oral daily  dextrose 50% Injectable 12.5 Gram(s) IV Push once  dextrose 50% Injectable 50 milliLiter(s) IV Push once  dextrose 50% Injectable 25 Gram(s) IV Push once  insulin glargine Injectable (LANTUS) 18 Unit(s) SubCutaneous every morning  insulin lispro (ADMELOG) corrective regimen sliding scale   SubCutaneous every 6 hours  insulin regular  human recombinant 11 Unit(s) SubCutaneous <User Schedule>  insulin regular  human recombinant 24 Unit(s) SubCutaneous <User Schedule>  insulin regular  human recombinant 9 Unit(s) SubCutaneous <User Schedule>  levothyroxine 125 MICROGram(s) Oral <User Schedule>  predniSONE   Tablet 5 milliGRAM(s) Oral daily      insulin glargine Injectable (LANTUS)   18 Unit(s) SubCutaneous (12-21-23 @ 08:01)    insulin lispro (ADMELOG) corrective regimen sliding scale   1 Unit(s) SubCutaneous (12-21-23 @ 17:57)   2 Unit(s) SubCutaneous (12-21-23 @ 12:12)   3 Unit(s) SubCutaneous (12-21-23 @ 06:10)   1 Unit(s) SubCutaneous (12-20-23 @ 23:59)    insulin regular  human recombinant   11 Unit(s) SubCutaneous (12-21-23 @ 12:13)    insulin regular  human recombinant   24 Unit(s) SubCutaneous (12-21-23 @ 06:11)    insulin regular  human recombinant   9 Unit(s) SubCutaneous (12-21-23 @ 17:56)    levothyroxine   125 MICROGram(s) Oral (12-21-23 @ 04:20)    predniSONE   Tablet   5 milliGRAM(s) Oral (12-21-23 @ 05:54)        PHYSICAL EXAM:  VITALS: T(C): 36.6 (12-21-23 @ 17:35)  T(F): 97.8 (12-21-23 @ 17:35), Max: 100.2 (12-20-23 @ 22:00)  HR: 87 (12-21-23 @ 17:35) (78 - 105)  BP: 111/64 (12-21-23 @ 17:35) (110/48 - 142/66)  RR:  (12 - 20)  SpO2:  (100% - 100%)  Wt(kg): --  GENERAL: Female laying in bed, in NAD  Respiratory: + trach   Extremities: Warm, no edema  NEURO: Open eyes to verbal command    LABS:  POCT Blood Glucose.: 165 mg/dL (12-21-23 @ 17:37)  POCT Blood Glucose.: 237 mg/dL (12-21-23 @ 11:16)  POCT Blood Glucose.: 253 mg/dL (12-21-23 @ 05:57)  POCT Blood Glucose.: 161 mg/dL (12-20-23 @ 23:46)  POCT Blood Glucose.: 190 mg/dL (12-20-23 @ 17:37)  POCT Blood Glucose.: 265 mg/dL (12-20-23 @ 12:26)  POCT Blood Glucose.: 146 mg/dL (12-20-23 @ 05:18)  POCT Blood Glucose.: 237 mg/dL (12-19-23 @ 23:48)  POCT Blood Glucose.: 181 mg/dL (12-19-23 @ 17:36)  POCT Blood Glucose.: 168 mg/dL (12-19-23 @ 11:33)  POCT Blood Glucose.: 151 mg/dL (12-19-23 @ 06:10)  POCT Blood Glucose.: 159 mg/dL (12-19-23 @ 00:32)                          8.6    10.20 )-----------( 94       ( 20 Dec 2023 04:13 )             28.2     12-20    135  |  97  |  19  ----------------------------<  166<H>  3.5   |  28  |  <0.30<L>    Ca    9.5      20 Dec 2023 04:13        11-25 Chol -- Direct LDL -- LDL calculated -- HDL -- Trig 192<H>  Thyroid Function Tests:  12-04 @ 07:01 TSH 1.57 FreeT4 -- T3 60 Anti TPO -- Anti Thyroglobulin Ab -- TSI --  11-28 @ 06:37 TSH 2.80 FreeT4 1.2 T3 -- Anti TPO -- Anti Thyroglobulin Ab -- TSI --      A1C with Estimated Average Glucose Result: 7.5 % (10-28-23 @ 07:18)    Estimated Average Glucose: 169 mg/dL (10-28-23 @ 07:18)        Diet, NPO with Tube Feed:   Tube Feeding Modality: Gastrostomy  Ikro glucose support 1.2  Total Volume for 24 Hours (mL): 1200  Continuous  Starting Tube Feed Rate mL per Hour: 60  Increase Tube Feed Rate by (mL): 0  Until Goal Tube Feed Rate (mL per Hour): 60  Tube Feed Duration (in Hours): 20  Tube Feed Start Time: 06:00  Tube Feed Stop Time: 02:00  Free Water Flush  Pump   Rate (mL per Hour): 10   Frequency: Every 2 Hours  Alfred(7 Gm Arginine/7 Gm Glut/1.2 Gm HMB     Qty per Day:  2 (11-29-23 @ 14:12) [Active]

## 2023-12-21 NOTE — PROGRESS NOTE ADULT - PROBLEM SELECTOR PLAN 1
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 aureus  - Sputum Cx 11/6: + PSA and Serratia, Cefepime 11/8 -->11/15  - 11/26 sputum culture MDR pseudomonas - clinically stable, defer treatment unless decompensates as per ID  - 11/26 urine culture - enterococcus - no need to treat - cfu low, organism not significant as per ID  - 12/19 respiked fever - cultures pending - received dose of vanco and cefepime - d/c'd, holding antibiotics until further results

## 2023-12-21 NOTE — PROGRESS NOTE ADULT - ASSESSMENT
72 y/o F with PMHx of T2DM, HTN, HLD, anemia, hypothyroidism, RA, fibromyalgia, remote cerebral aneurysm repair, acute hypercapnic respiratory failure now with tracheostomy, PEG tube, and bedbound (trach change 8/18, PEG change 8/14), sacral pressure ulcer, BIBEMS from home for 6 day hx of bleeding from the tracheostomy and PEG tube with associated abdominal pain, recent history of auditory and visual hallucinations, and with chronic sacral decubitus ulcer. Exam notable for mild abdominal distention with LLQ and RLQ tenderness, tracheostomy in place with no obvious bleeding, PEG tube with scant amount of dried blood, at baseline neurologic status. Imaging showing no active bleeding around tracheostomy site, however was notable for mild thickening along PEG tube tract, sacral ulcer extending to the coccyx suggestive of possible osteomyelitis, and bibasilar patchy lung opacities with volume loss. Patient admitted for respiratory support, wound care of tracheostomy and PEG, and management of sacral osteomyelitis. No further Trach and peg site bleeding noted since admission.  Pelvic MRI imaging also suggestive of Acute OM. Patient placed on long-term antibiotics with Infectious disease guidance.  Additionally treated with a 7d course of Cefepime due to PSA and Serratia tracheitis on 11/8-->11/15 and then resumed cipro/keflex.  Hospital course c/b Delirium for which Seroquel was added and home Lexapro continued. Tracheostomy changed to #9 Cuffed [place] by ENT 11/3.  Despite trach change patient remained with persistent air leak.  On 11/19, the trach was again changed due to leak and loss of volumes on vent.  Trach was changed to #8 distal cuffed Shiley.  Patient seen by Dermatology for back lesions.  One diagnosed as a seborrheic keratitis and the other a dermatofibroma.  Intermittent abdominal pain throughout hospital course, at times relieved with gas/BM, w/u negative, seen by GI - no recs.  12/10 pt completed cipro and keflex for osteo treatment.  12/13 moderate amounts of secretions w/ blood - tranexamic acid neb given with notable improvement.  12/18 with blood tinged secretion again - TXA 250mg neb x2 doses.  12/19 spiked fever to 102 - pancultured, results still pending.    12/20:  Pt spiked fever to 102 last night.  Pancultured - UA negative - will follow up pending cultures.  Pt received dose of vanco and was started on cefepime.  Holding antibiotics for now, no leukocytosis, no respiratory distress, HDS.  ID recs appreciated.  GI applied silver nitrate to site - will monitor.  Spoke with dental resident again this am, plan for no extractions as confirmed with their attending, updated Marysol.  Pending RVP and low extremity dopplers.     74 y/o F with PMHx of T2DM, HTN, HLD, anemia, hypothyroidism, RA, fibromyalgia, remote cerebral aneurysm repair, acute hypercapnic respiratory failure now with tracheostomy, PEG tube, and bedbound (trach change 8/18, PEG change 8/14), sacral pressure ulcer, BIBEMS from home for 6 day hx of bleeding from the tracheostomy and PEG tube with associated abdominal pain, recent history of auditory and visual hallucinations, and with chronic sacral decubitus ulcer. Exam notable for mild abdominal distention with LLQ and RLQ tenderness, tracheostomy in place with no obvious bleeding, PEG tube with scant amount of dried blood, at baseline neurologic status. Imaging showing no active bleeding around tracheostomy site, however was notable for mild thickening along PEG tube tract, sacral ulcer extending to the coccyx suggestive of possible osteomyelitis, and bibasilar patchy lung opacities with volume loss. Patient admitted for respiratory support, wound care of tracheostomy and PEG, and management of sacral osteomyelitis. No further Trach and peg site bleeding noted since admission.  Pelvic MRI imaging also suggestive of Acute OM. Patient placed on long-term antibiotics with Infectious disease guidance.  Additionally treated with a 7d course of Cefepime due to PSA and Serratia tracheitis on 11/8-->11/15 and then resumed cipro/keflex.  Hospital course c/b Delirium for which Seroquel was added and home Lexapro continued. Tracheostomy changed to #9 Cuffed [place] by ENT 11/3.  Despite trach change patient remained with persistent air leak.  On 11/19, the trach was again changed due to leak and loss of volumes on vent.  Trach was changed to #8 distal cuffed Shiley.  Patient seen by Dermatology for back lesions.  One diagnosed as a seborrheic keratitis and the other a dermatofibroma.  Intermittent abdominal pain throughout hospital course, at times relieved with gas/BM, w/u negative, seen by GI - no recs.  12/10 pt completed cipro and keflex for osteo treatment.  12/13 moderate amounts of secretions w/ blood - tranexamic acid neb given with notable improvement.  12/18 with blood tinged secretion again - TXA 250mg neb x2 doses.  12/19 spiked fever to 102 - pancultured, results still pending.    12/20:  Pt spiked fever to 102 last night.  Pancultured - UA negative - will follow up pending cultures.  Pt received dose of vanco and was started on cefepime.  Holding antibiotics for now, no leukocytosis, no respiratory distress, HDS.  ID recs appreciated.  GI applied silver nitrate to site - will monitor.  Spoke with dental resident again this am, plan for no extractions as confirmed with their attending, updated Marysol.  Pending RVP and low extremity dopplers.     72 y/o F with PMHx of T2DM, HTN, HLD, anemia, hypothyroidism, RA, fibromyalgia, remote cerebral aneurysm repair, acute hypercapnic respiratory failure now with tracheostomy, PEG tube, and bedbound (trach change 8/18, PEG change 8/14), sacral pressure ulcer, BIBEMS from home for 6 day hx of bleeding from the tracheostomy and PEG tube with associated abdominal pain, recent history of auditory and visual hallucinations, and with chronic sacral decubitus ulcer. Exam notable for mild abdominal distention with LLQ and RLQ tenderness, tracheostomy in place with no obvious bleeding, PEG tube with scant amount of dried blood, at baseline neurologic status. Imaging showing no active bleeding around tracheostomy site, however was notable for mild thickening along PEG tube tract, sacral ulcer extending to the coccyx suggestive of possible osteomyelitis, and bibasilar patchy lung opacities with volume loss. Patient admitted for respiratory support, wound care of tracheostomy and PEG, and management of sacral osteomyelitis. No further Trach and peg site bleeding noted since admission.  Pelvic MRI imaging also suggestive of Acute OM. Patient placed on long-term antibiotics with Infectious disease guidance.  Additionally treated with a 7d course of Cefepime due to PSA and Serratia tracheitis on 11/8-->11/15 and then resumed cipro/keflex.  Hospital course c/b Delirium for which Seroquel was added and home Lexapro continued. Tracheostomy changed to #9 Cuffed [place] by ENT 11/3.  Despite trach change patient remained with persistent air leak.  On 11/19, the trach was again changed due to leak and loss of volumes on vent.  Trach was changed to #8 distal cuffed [place]iley.  Patient seen by Dermatology for back lesions.  One diagnosed as a seborrheic keratitis and the other a dermatofibroma.  Intermittent abdominal pain throughout hospital course, at times relieved with gas/BM, w/u negative, seen by GI - no recs.  12/10 pt completed cipro and keflex for osteo treatment.  12/13 moderate amounts of secretions w/ blood - tranexamic acid neb given with notable improvement.  12/18 with blood tinged secretion again - TXA 250mg neb x2 doses.  12/19 spiked fever to 102 - pancultured, results still pending.    12/20:  Pt spiked fever to 102 last night.  Pancultured - UA negative - will follow up pending cultures.  Pt received dose of vanco and was started on cefepime.  Holding antibiotics for now, no leukocytosis, no respiratory distress, HDS.  ID recs appreciated.  GI applied silver nitrate to site - will monitor.  Spoke with dental resident again this am, plan for no extractions as confirmed with their attending, updated Marysol.  Pending RVP and low extremity dopplers.      12/21:  Tmax 100.2F  Remains off antibiotics.   74 y/o F with PMHx of T2DM, HTN, HLD, anemia, hypothyroidism, RA, fibromyalgia, remote cerebral aneurysm repair, acute hypercapnic respiratory failure now with tracheostomy, PEG tube, and bedbound (trach change 8/18, PEG change 8/14), sacral pressure ulcer, BIBEMS from home for 6 day hx of bleeding from the tracheostomy and PEG tube with associated abdominal pain, recent history of auditory and visual hallucinations, and with chronic sacral decubitus ulcer. Exam notable for mild abdominal distention with LLQ and RLQ tenderness, tracheostomy in place with no obvious bleeding, PEG tube with scant amount of dried blood, at baseline neurologic status. Imaging showing no active bleeding around tracheostomy site, however was notable for mild thickening along PEG tube tract, sacral ulcer extending to the coccyx suggestive of possible osteomyelitis, and bibasilar patchy lung opacities with volume loss. Patient admitted for respiratory support, wound care of tracheostomy and PEG, and management of sacral osteomyelitis. No further Trach and peg site bleeding noted since admission.  Pelvic MRI imaging also suggestive of Acute OM. Patient placed on long-term antibiotics with Infectious disease guidance.  Additionally treated with a 7d course of Cefepime due to PSA and Serratia tracheitis on 11/8-->11/15 and then resumed cipro/keflex.  Hospital course c/b Delirium for which Seroquel was added and home Lexapro continued. Tracheostomy changed to #9 Cuffed [place] by ENT 11/3.  Despite trach change patient remained with persistent air leak.  On 11/19, the trach was again changed due to leak and loss of volumes on vent.  Trach was changed to #8 distal cuffed Shiley.  Patient seen by Dermatology for back lesions.  One diagnosed as a seborrheic keratitis and the other a dermatofibroma.  Intermittent abdominal pain throughout hospital course, at times relieved with gas/BM, w/u negative, seen by GI - no recs.  12/10 pt completed cipro and keflex for osteo treatment.  12/13 moderate amounts of secretions w/ blood - tranexamic acid neb given with notable improvement.  12/18 with blood tinged secretion again - TXA 250mg neb x2 doses.  12/19 spiked fever to 102 - pancultured, results still pending.    12/20:  Pt spiked fever to 102 last night.  Pancultured - UA negative - will follow up pending cultures.  Pt received dose of vanco and was started on cefepime.  Holding antibiotics for now, no leukocytosis, no respiratory distress, HDS.  ID recs appreciated.  GI applied silver nitrate to site - will monitor.  Spoke with dental resident again this am, plan for no extractions as confirmed with their attending, updated Marysol.  Pending RVP and low extremity dopplers.      12/21:  Tmax 100.2F  Remains off antibiotics.   Mohs Method Verbiage: An incision at a 45 degree angle following the standard Mohs approach was done and the specimen was harvested as a microscopic controlled layer.

## 2023-12-21 NOTE — PROGRESS NOTE ADULT - PROBLEM SELECTOR PLAN 1
- FS BG monitoring [id number] while on TFs/NPO   - Continue lantus 18 units sc qAM   - Adjust  Regular insulin 25units sc at 6am, 11 units sc at 12 noon and 9 units sc at 6pm. PLEASE DO NOT HOLD IF PATIENT IS ON TUBE FEEDS (hold only if TF are stopped). Can decrease/adjust dose if there is a concern for hypoglycemia.   - C/w low dose admelog correction scale every 6 hours  - Will follow and adjust insulin as needed  DISCHARGE:  - Will be determined according to BG/insulin needs/TFs and steroid therapy at time of discharge. If discharge within next 24 hours will continue with same insulin types and doses as noted above with the exception of Admelog correction scale.   - Needs telemedicine as outpatient due to bedbound status. Can follow up at [facility] practice. [address] Phone . Make sure pt has Rx for all DM supplies and insulin. - FS BG monitoring [id number] while on TFs/NPO   - Continue lantus 18 units sc qAM   - Adjust  Regular insulin 25units sc at 6am, 11 units sc at 12 noon and 9 units sc at 6pm. PLEASE DO NOT HOLD IF PATIENT IS ON TUBE FEEDS (hold only if TF are stopped). Can decrease/adjust dose if there is a concern for hypoglycemia.   - C/w low dose admelog correction scale every 6 hours  - Will follow and adjust insulin as needed  DISCHARGE:  - Will be determined according to BG/insulin needs/TFs and steroid therapy at time of discharge. If discharge within next 24 hours will continue with same insulin types and doses as noted above with the exception of Admelog correction scale.   - Needs telemedicine as outpatient due to bedbound status. Can follow up at [facility] practice. [address]. Phone . Make sure pt has Rx for all DM supplies and insulin.

## 2023-12-21 NOTE — PROGRESS NOTE ADULT - ASSESSMENT
72 y/o F w/h/o T2DM with unknown control due to anemia of chronic disease skewing A1C levels. On Levemir and Humalog insulin PTA. Unknown DM complications. Also h/o HTN, HLD, anemia, hypothyroidism, RA, fibromyalgia, remote cerebral aneurysm repair, acute hypercapnic respiratory failure now with tracheostomy, PEG tube, and bedbound (trach change 8/18, PEG change 8/14), sacral pressure ulcer, BIBEMS from home for 6 day hx of bleeding from the tracheostomy and PEG tube with associated abdominal pain> now resolved. Endocrinology consulted for hyperglycemia. BG Goal 100-180mg/dl     Tolerating TFs from 6am to 2am. 6 am BG (253) elevated without pattern, and 12 noon BGs in 200s for 2 days

## 2023-12-21 NOTE — PROGRESS NOTE ADULT - PROBLEM SELECTOR PLAN 4
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  - Tolerates intermittent PSV 15/5 during day/Vent HS  - Airway Clearance: Duoneb, Hypersal, Chest PT, PRN suctioning   - Maintain 02 sat > 92%    Tracheal Bleeding (Intermittent)-->resolved since admission   - S/p CT Angio neck: No evidence of active bleeding around tracheostomy site. No hematoma.  No collection   - Seen by ENT; Kath and b/l bronchi visualized without any trauma, small amount of blood tinged secretions resting at beginning of right bronchus not actively bleeding.  - 12/13 tracheal bleeding upon suctioning - tranexamic neb given  - 12/18 tranexamic neb x2 doses - improved

## 2023-12-22 LAB
GLUCOSE BLDC GLUCOMTR-MCNC: 137 MG/DL — HIGH (ref 70–99)
GLUCOSE BLDC GLUCOMTR-MCNC: 137 MG/DL — HIGH (ref 70–99)
GLUCOSE BLDC GLUCOMTR-MCNC: 170 MG/DL — HIGH (ref 70–99)
GLUCOSE BLDC GLUCOMTR-MCNC: 170 MG/DL — HIGH (ref 70–99)
GLUCOSE BLDC GLUCOMTR-MCNC: 176 MG/DL — HIGH (ref 70–99)
GLUCOSE BLDC GLUCOMTR-MCNC: 176 MG/DL — HIGH (ref 70–99)
GLUCOSE BLDC GLUCOMTR-MCNC: 218 MG/DL — HIGH (ref 70–99)
GLUCOSE BLDC GLUCOMTR-MCNC: 218 MG/DL — HIGH (ref 70–99)

## 2023-12-22 PROCEDURE — 99232 SBSQ HOSP IP/OBS MODERATE 35: CPT

## 2023-12-22 RX ORDER — LIDOCAINE 4 G/100G
1 CREAM TOPICAL DAILY
Refills: 0 | Status: DISCONTINUED | OUTPATIENT
Start: 2023-12-22 | End: 2024-01-17

## 2023-12-22 RX ORDER — INSULIN HUMAN 100 [IU]/ML
26 INJECTION, SOLUTION SUBCUTANEOUS
Refills: 0 | Status: DISCONTINUED | OUTPATIENT
Start: 2023-12-23 | End: 2023-12-25

## 2023-12-22 RX ORDER — LIDOCAINE 4 G/100G
1 CREAM TOPICAL DAILY
Refills: 0 | Status: DISCONTINUED | OUTPATIENT
Start: 2023-12-22 | End: 2023-12-22

## 2023-12-22 RX ADMIN — Medication 2 DROP(S): at 12:32

## 2023-12-22 RX ADMIN — LIDOCAINE 1 PATCH: 4 CREAM TOPICAL at 19:52

## 2023-12-22 RX ADMIN — LIDOCAINE 5 MILLILITER(S): 4 CREAM TOPICAL at 19:20

## 2023-12-22 RX ADMIN — NYSTATIN CREAM 1 APPLICATION(S): 100000 CREAM TOPICAL at 22:16

## 2023-12-22 RX ADMIN — Medication 1 APPLICATION(S): at 12:24

## 2023-12-22 RX ADMIN — Medication 1 APPLICATION(S): at 22:16

## 2023-12-22 RX ADMIN — Medication 125 MICROGRAM(S): at 05:07

## 2023-12-22 RX ADMIN — PHENYLEPHRINE-SHARK LIVER OIL-MINERAL OIL-PETROLATUM RECTAL OINTMENT 1 APPLICATION(S): at 12:24

## 2023-12-22 RX ADMIN — Medication 2 DROP(S): at 22:16

## 2023-12-22 RX ADMIN — LIDOCAINE 1 PATCH: 4 CREAM TOPICAL at 14:57

## 2023-12-22 RX ADMIN — Medication 1 TABLET(S): at 18:08

## 2023-12-22 RX ADMIN — ZINC OXIDE 1 APPLICATION(S): 200 OINTMENT TOPICAL at 12:25

## 2023-12-22 RX ADMIN — INSULIN HUMAN 25 UNIT(S): 100 INJECTION, SOLUTION SUBCUTANEOUS at 05:40

## 2023-12-22 RX ADMIN — Medication 15 MILLILITER(S): at 11:37

## 2023-12-22 RX ADMIN — Medication 1 TABLET(S): at 05:33

## 2023-12-22 RX ADMIN — PANTOPRAZOLE SODIUM 40 MILLIGRAM(S): 20 TABLET, DELAYED RELEASE ORAL at 07:43

## 2023-12-22 RX ADMIN — INSULIN HUMAN 11 UNIT(S): 100 INJECTION, SOLUTION SUBCUTANEOUS at 11:39

## 2023-12-22 RX ADMIN — Medication 1 APPLICATION(S): at 18:08

## 2023-12-22 RX ADMIN — Medication 5 MILLIGRAM(S): at 05:32

## 2023-12-22 RX ADMIN — Medication 3 MILLILITER(S): at 17:57

## 2023-12-22 RX ADMIN — NYSTATIN CREAM 1 APPLICATION(S): 100000 CREAM TOPICAL at 14:58

## 2023-12-22 RX ADMIN — Medication 10 MILLILITER(S): at 11:38

## 2023-12-22 RX ADMIN — Medication 2: at 11:38

## 2023-12-22 RX ADMIN — Medication 1 APPLICATION(S): at 05:32

## 2023-12-22 RX ADMIN — Medication 2 DROP(S): at 05:32

## 2023-12-22 RX ADMIN — NYSTATIN CREAM 1 APPLICATION(S): 100000 CREAM TOPICAL at 05:33

## 2023-12-22 RX ADMIN — CHLORHEXIDINE GLUCONATE 15 MILLILITER(S): 213 SOLUTION TOPICAL at 18:09

## 2023-12-22 RX ADMIN — SIMETHICONE 80 MILLIGRAM(S): 80 TABLET, CHEWABLE ORAL at 22:15

## 2023-12-22 RX ADMIN — AMLODIPINE BESYLATE 10 MILLIGRAM(S): 2.5 TABLET ORAL at 05:32

## 2023-12-22 RX ADMIN — LIDOCAINE 5 MILLILITER(S): 4 CREAM TOPICAL at 05:31

## 2023-12-22 RX ADMIN — SIMETHICONE 80 MILLIGRAM(S): 80 TABLET, CHEWABLE ORAL at 11:38

## 2023-12-22 RX ADMIN — Medication 500 MILLIGRAM(S): at 11:38

## 2023-12-22 RX ADMIN — LIDOCAINE 1 PATCH: 4 CREAM TOPICAL at 19:51

## 2023-12-22 RX ADMIN — Medication 3 MILLILITER(S): at 05:31

## 2023-12-22 RX ADMIN — LIDOCAINE 1 PATCH: 4 CREAM TOPICAL at 14:51

## 2023-12-22 RX ADMIN — CHLORHEXIDINE GLUCONATE 1 APPLICATION(S): 213 SOLUTION TOPICAL at 05:33

## 2023-12-22 RX ADMIN — GABAPENTIN 100 MILLIGRAM(S): 400 CAPSULE ORAL at 22:15

## 2023-12-22 RX ADMIN — Medication 1: at 18:09

## 2023-12-22 RX ADMIN — SIMETHICONE 80 MILLIGRAM(S): 80 TABLET, CHEWABLE ORAL at 05:31

## 2023-12-22 RX ADMIN — Medication 5 MILLIGRAM(S): at 22:16

## 2023-12-22 RX ADMIN — QUETIAPINE FUMARATE 12.5 MILLIGRAM(S): 200 TABLET, FILM COATED ORAL at 18:38

## 2023-12-22 RX ADMIN — ESCITALOPRAM OXALATE 10 MILLIGRAM(S): 10 TABLET, FILM COATED ORAL at 07:43

## 2023-12-22 RX ADMIN — Medication 3 MILLILITER(S): at 11:31

## 2023-12-22 RX ADMIN — CHLORHEXIDINE GLUCONATE 15 MILLILITER(S): 213 SOLUTION TOPICAL at 05:31

## 2023-12-22 RX ADMIN — INSULIN GLARGINE 18 UNIT(S): 100 INJECTION, SOLUTION SUBCUTANEOUS at 07:44

## 2023-12-22 RX ADMIN — Medication 1 MILLIGRAM(S): at 22:15

## 2023-12-22 RX ADMIN — INSULIN HUMAN 9 UNIT(S): 100 INJECTION, SOLUTION SUBCUTANEOUS at 18:10

## 2023-12-22 RX ADMIN — Medication 3 MILLILITER(S): at 23:13

## 2023-12-22 RX ADMIN — Medication 2 DROP(S): at 18:12

## 2023-12-22 RX ADMIN — SIMETHICONE 80 MILLIGRAM(S): 80 TABLET, CHEWABLE ORAL at 18:09

## 2023-12-22 NOTE — PROGRESS NOTE ADULT - PROBLEM SELECTOR PLAN 8
- s/p Home Levemir 14 units in morning held on admission as noted w/ hypoglycemia   - Enteral feed changed to [employer] diabetic formula; glucose numbers stabilizing  - adjustments made throughout admission per endocrine recs - s/p Home Levemir 14 units in morning held on admission as noted w/ hypoglycemia   - Enteral feed changed to [employer] diabetic formula; glucose numbers stabilizing  - adjustments made throughout admission per endocrine recs

## 2023-12-22 NOTE — PROGRESS NOTE ADULT - ASSESSMENT
74 y/o F w/h/o T2DM with unknown control due to anemia of chronic disease skewing A1C levels. On Levemir and Humalog insulin PTA. Unknown DM complications. Also h/o HTN, HLD, anemia, hypothyroidism, RA, fibromyalgia, remote cerebral aneurysm repair, acute hypercapnic respiratory failure now with tracheostomy, PEG tube, and bedbound (trach change 8/18, PEG change 8/14), sacral pressure ulcer, BIBEMS from home for 6 day hx of bleeding from the tracheostomy and PEG tube with associated abdominal pain> now resolved. Endocrinology consulted for hyperglycemia. BG Goal 100-180mg/dl     Tolerating TFs at goal 60 ml/hr from 6am to 2am. Last 24 hour BGs variable 100s-218. 12 noon BG in 200s for 3 days.

## 2023-12-22 NOTE — PROGRESS NOTE ADULT - SUBJECTIVE AND OBJECTIVE BOX
Chief Complaint:  Patient is a 73y old  Female who presents with a chief complaint of PEG bleeding (20 Dec 2023 17:57)      Date of service 12-22-23 @ 13:40      Interval Events:   no acute events     Hospital Medications:  acetaminophen   Oral Liquid .. 1000 milliGRAM(s) Enteral Tube every 6 hours PRN  albuterol/ipratropium for Nebulization 3 milliLiter(s) Nebulizer every 6 hours  amLODIPine   Tablet 10 milliGRAM(s) Oral daily  artificial  tears Solution 2 Drop(s) Both EYES four times a day  ascorbic acid 500 milliGRAM(s) Oral daily  benzocaine 20% Spray 1 Spray(s) Topical four times a day PRN  calcium carbonate    500 mG (Tums) Chewable 1 Tablet(s) Chew every 12 hours  chlorhexidine 0.12% Liquid 15 milliLiter(s) Oral Mucosa every 12 hours  chlorhexidine 4% Liquid 1 Application(s) Topical <User Schedule>  dextrose 50% Injectable 12.5 Gram(s) IV Push once  dextrose 50% Injectable 50 milliLiter(s) IV Push once  dextrose 50% Injectable 25 Gram(s) IV Push once  diclofenac sodium 1% Gel 2 Gram(s) Topical four times a day PRN  diphenhydrAMINE Elixir 25 milliGRAM(s) Oral every 6 hours PRN  escitalopram 10 milliGRAM(s) Oral <User Schedule>  fentaNYL   Patch  25 MICROgram(s)/Hr 1 Patch Transdermal every 72 hours  gabapentin Solution 100 milliGRAM(s) Enteral Tube at bedtime  glucagon  Injectable 1 milliGRAM(s) IntraMuscular once  guaifenesin/dextromethorphan Oral Liquid 10 milliLiter(s) Oral every 6 hours PRN  hemorrhoidal Ointment      hemorrhoidal Ointment 1 Application(s) Rectal daily  insulin glargine Injectable (LANTUS) 18 Unit(s) SubCutaneous every morning  insulin lispro (ADMELOG) corrective regimen sliding scale   SubCutaneous every 6 hours  insulin regular  human recombinant 25 Unit(s) SubCutaneous <User Schedule>  insulin regular  human recombinant 11 Unit(s) SubCutaneous <User Schedule>  insulin regular  human recombinant 9 Unit(s) SubCutaneous <User Schedule>  levothyroxine 125 MICROGram(s) Oral <User Schedule>  lidocaine   4% Patch 1 Patch Transdermal daily PRN  lidocaine   4% Patch 1 Patch Transdermal daily  lidocaine 2% Viscous 5 milliLiter(s) Mucosal two times a day  melatonin 5 milliGRAM(s) Oral at bedtime  melatonin 1 milliGRAM(s) Oral at bedtime  multivitamin/minerals/iron Oral Solution (CENTRUM) 15 milliLiter(s) Oral daily  nystatin Powder 1 Application(s) Topical every 8 hours  oxyCODONE    Solution 2.5 milliGRAM(s) Oral every 6 hours PRN  pantoprazole   Suspension 40 milliGRAM(s) Enteral Tube <User Schedule>  petrolatum Ophthalmic Ointment 1 Application(s) Both EYES four times a day  predniSONE   Tablet 5 milliGRAM(s) Oral daily  QUEtiapine 12.5 milliGRAM(s) Oral <User Schedule>  silver nitrate Applicator 1 Application(s) Topical once  simethicone 80 milliGRAM(s) Chew four times a day  sodium chloride 0.65% Nasal 1 Spray(s) Both Nostrils every 6 hours PRN  zinc oxide 40% Paste 1 Application(s) Topical daily        Review of Systems:  General:  No wt loss, fevers, chills, night sweats, fatigue,   Eyes:  Good vision, no reported pain  ENT:  No sore throat, pain, runny nose, dysphagia  CV:  No pain, palpitations, hypo/hypertension  Resp:  No dyspnea, cough, tachypnea, wheezing  GI:  See HPI  :  No pain, bleeding, incontinence, nocturia  Muscle:  No pain, weakness  Neuro:  No weakness, tingling, memory problems  Psych:  No fatigue, insomnia, mood problems, depression  Endocrine:  No polyuria, polydipsia, cold/heat intolerance  Heme:  No petechiae, ecchymosis, easy bruisability  Integumentary:  No rash, edema    PHYSICAL EXAM:   Vital Signs:  Vital Signs Last 24 Hrs  T(C): 37.3 (22 Dec 2023 09:46), Max: 37.3 (22 Dec 2023 09:46)  T(F): 99.1 (22 Dec 2023 09:46), Max: 99.1 (22 Dec 2023 09:46)  HR: 101 (22 Dec 2023 12:09) (78 - 105)  BP: 114/47 (22 Dec 2023 09:46) (111/64 - 143/71)  BP(mean): --  RR: 23 (22 Dec 2023 09:46) (16 - 23)  SpO2: 100% (22 Dec 2023 12:09) (100% - 100%)    Parameters below as of 22 Dec 2023 11:45  Patient On (Oxygen Delivery Method): ventilator      Daily     Daily       PHYSICAL EXAM:     GENERAL:  Appears stated age, well-groomed, well-nourished, no distress  HEENT:  NC/AT,  conjunctivae anicteric, clear and pink,   NECK: supple, trachea midline  CHEST:  Full & symmetric excursion, no increased effort, breath sounds clear  HEART:  Regular rhythm, no JVD  ABDOMEN:  Soft, non-tender, non-distended, normoactive bowel sounds,  no masses , no hepatosplenomegaly  EXTREMITIES:  no cyanosis,clubbing or edema  SKIN:  No rash, erythema, or, ecchymoses, no jaundice  NEURO:  Alert, non-focal, no asterixis  PSYCH: Appropriate affect, oriented to place and time  RECTAL: Deferred      LABS Personally reviewed by me:                                          8.6    10.20 )-----------( 94       ( 20 Dec 2023 04:13 )             28.2       Imaging personally reviewed by me:

## 2023-12-22 NOTE — PROGRESS NOTE ADULT - PROBLEM SELECTOR PLAN 1
- FS BG monitoring [id number] while on TFs/NPO   - Continue lantus 18 units sc qAM   - Adjust  Regular insulin 26 units sc at 6am, 11 units sc at 12 noon and 9 units sc at 6pm. PLEASE DO NOT HOLD IF PATIENT IS ON TUBE FEEDS (hold only if TF are stopped). Can decrease/adjust dose if there is a concern for hypoglycemia.   - C/w low dose admelog correction scale every 6 hours  - please inform endocrine team if there are any changes in tube feeding( formula or rate)   - Will follow and adjust insulin as needed  DISCHARGE:  - Will be determined according to BG/insulin needs/TFs and steroid therapy at time of discharge. If discharge within next 24 hours will continue with same insulin types and doses as noted above with the exception of Admelog correction scale.   - Needs telemedicine as outpatient due to bedbound status. Can follow up at [facility] practice. [address] [place] [address] [address]. Phone . Make sure pt has Rx for all DM supplies and insulin. - FS BG monitoring [id number] while on TFs/NPO   - Continue lantus 18 units sc qAM   - Adjust  Regular insulin 26 units sc at 6am, 11 units sc at 12 noon and 9 units sc at 6pm. PLEASE DO NOT HOLD IF PATIENT IS ON TUBE FEEDS (hold only if TF are stopped). Can decrease/adjust dose if there is a concern for hypoglycemia.   - C/w low dose admelog correction scale every 6 hours  - please inform endocrine team if there are any changes in tube feeding( formula or rate)   - Will follow and adjust insulin as needed  DISCHARGE:  - Will be determined according to BG/insulin needs/TFs and steroid therapy at time of discharge. If discharge within next 24 hours will continue with same insulin types and doses as noted above with the exception of Admelog correction scale.   - Needs telemedicine as outpatient due to bedbound status. Can follow up at [facility] practice. [address] [place] [address] [address]. Phone . Make sure pt has Rx for all DM supplies and insulin.

## 2023-12-22 NOTE — PROGRESS NOTE ADULT - PROBLEM SELECTOR PLAN 5
- LDL goal <70 due to DM  - Outpatient fasting lipid profile   - Consider moderate intensity Statin if not contraindicated and based on risks/benefits for pt  - Manage per primary team    Nissa Veliz NP-C   Contact via [employer] Teams during business hours  To reach covering provider access AMION via sunrise tools  For Urgent matters/after-hours/weekends/holidays please page endocrine fellow on call   For nonurgent matters please email [email or web address]    Please note that this patient may be followed by different provider tomorrow.  Notify endocrine 24 hours prior to discharge for final recommendations - LDL goal <70 due to DM  - Outpatient fasting lipid profile   - Consider moderate intensity Statin if not contraindicated and based on risks/benefits for pt  - Manage per primary team    Nissa Veliz NP-C   Contact via [employer] Teams during business hours  To reach covering provider access AMION via sunrise tools  For Urgent matters/after-hours/weekends/holidays please page endocrine fellow on call   For nonurgent matters please email [email or web address]    Please note that this patient may be followed by different provider tomorrow.  Notify endocrine 24 hours prior to discharge for final recommendations

## 2023-12-22 NOTE — PROGRESS NOTE ADULT - ASSESSMENT
74 y/o F with PMHx of T2DM, HTN, HLD, anemia, hypothyroidism, RA, fibromyalgia, remote cerebral aneurysm repair, acute hypercapnic respiratory failure now with tracheostomy, PEG tube, and bedbound (trach change 8/18, PEG change 8/14), sacral pressure ulcer, BIBEMS from home for 6 day hx of bleeding from the tracheostomy and PEG tube with associated abdominal pain, recent history of auditory and visual hallucinations, and with chronic sacral decubitus ulcer. Exam notable for mild abdominal distention with LLQ and RLQ tenderness, tracheostomy in place with no obvious bleeding, PEG tube with scant amount of dried blood, at baseline neurologic status. Imaging showing no active bleeding around tracheostomy site, however was notable for mild thickening along PEG tube tract, sacral ulcer extending to the coccyx suggestive of possible osteomyelitis, and bibasilar patchy lung opacities with volume loss. Patient admitted for respiratory support, wound care of tracheostomy and PEG, and management of sacral osteomyelitis. No further Trach and peg site bleeding noted since admission.  Pelvic MRI imaging also suggestive of Acute OM. Patient placed on long-term antibiotics with Infectious disease guidance.  Additionally treated with a 7d course of Cefepime due to PSA and Serratia tracheitis on 11/8-->11/15 and then resumed cipro/keflex.  Hospital course c/b Delirium for which Seroquel was added and home Lexapro continued. Tracheostomy changed to #9 Cuffed Portex by ENT 11/3.  Despite trach change patient remained with persistent air leak.  On 11/19, the trach was again changed due to leak and loss of volumes on vent.  Trach was changed to #8 distal cuffed Shiley.  Patient seen by Dermatology for back lesions.  One diagnosed as a seborrheic keratitis and the other a dermatofibroma.  Intermittent abdominal pain throughout hospital course, at times relieved with gas/BM, w/u negative, seen by GI - no recs.  12/10 pt completed cipro and keflex for osteo treatment.  12/13 moderate amounts of secretions w/ blood - tranexamic acid neb given with notable improvement.  12/18 with blood tinged secretion again - TXA 250mg neb x2 doses.  12/19 spiked fever to 102 - pancultured, results still pending.    12/20:  Pt spiked fever to 102 last night.  Pancultured - UA negative - will follow up pending cultures.  Pt received dose of vanco and was started on cefepime.  Holding antibiotics for now, no leukocytosis, no respiratory distress, HDS.  ID recs appreciated.  GI applied silver nitrate to site - will monitor.  Spoke with dental resident again this am, plan for no extractions as confirmed with their attending, updated Marysol.  Pending RVP and low extremity dopplers.      12/21:  Tmax 100.2F  Remains off antibiotics.   72 y/o F with PMHx of T2DM, HTN, HLD, anemia, hypothyroidism, RA, fibromyalgia, remote cerebral aneurysm repair, acute hypercapnic respiratory failure now with tracheostomy, PEG tube, and bedbound (trach change 8/18, PEG change 8/14), sacral pressure ulcer, BIBEMS from home for 6 day hx of bleeding from the tracheostomy and PEG tube with associated abdominal pain, recent history of auditory and visual hallucinations, and with chronic sacral decubitus ulcer. Exam notable for mild abdominal distention with LLQ and RLQ tenderness, tracheostomy in place with no obvious bleeding, PEG tube with scant amount of dried blood, at baseline neurologic status. Imaging showing no active bleeding around tracheostomy site, however was notable for mild thickening along PEG tube tract, sacral ulcer extending to the coccyx suggestive of possible osteomyelitis, and bibasilar patchy lung opacities with volume loss. Patient admitted for respiratory support, wound care of tracheostomy and PEG, and management of sacral osteomyelitis. No further Trach and peg site bleeding noted since admission.  Pelvic MRI imaging also suggestive of Acute OM. Patient placed on long-term antibiotics with Infectious disease guidance.  Additionally treated with a 7d course of Cefepime due to PSA and Serratia tracheitis on 11/8-->11/15 and then resumed cipro/keflex.  Hospital course c/b Delirium for which Seroquel was added and home Lexapro continued. Tracheostomy changed to #9 Cuffed Portex by ENT 11/3.  Despite trach change patient remained with persistent air leak.  On 11/19, the trach was again changed due to leak and loss of volumes on vent.  Trach was changed to #8 distal cuffed Shiley.  Patient seen by Dermatology for back lesions.  One diagnosed as a seborrheic keratitis and the other a dermatofibroma.  Intermittent abdominal pain throughout hospital course, at times relieved with gas/BM, w/u negative, seen by GI - no recs.  12/10 pt completed cipro and keflex for osteo treatment.  12/13 moderate amounts of secretions w/ blood - tranexamic acid neb given with notable improvement.  12/18 with blood tinged secretion again - TXA 250mg neb x2 doses.  12/19 spiked fever to 102 - pancultured, results still pending.    12/20:  Pt spiked fever to 102 last night.  Pancultured - UA negative - will follow up pending cultures.  Pt received dose of vanco and was started on cefepime.  Holding antibiotics for now, no leukocytosis, no respiratory distress, HDS.  ID recs appreciated.  GI applied silver nitrate to site - will monitor.  Spoke with dental resident again this am, plan for no extractions as confirmed with their attending, updated Marysol.  Pending RVP and low extremity dopplers.      12/21:  Tmax 100.2F  Remains off antibiotics.    12/22: Afebrile overnight and cultures are NTD. Will continue to monitor off abx. Tolerating PS for most of the day 12/22. Daughter updated at bedside.

## 2023-12-22 NOTE — PROGRESS NOTE ADULT - ASSESSMENT
73 year old female with respiratory failure s/p trach and PEG, sacral osteomyelitis.     leakage from chronic PEG site, improved.   - s/p trial topical silver nitrate   - on decreased rate of tube feeds / administer TF and meds by gravity if feasible  - cont PPI      I had a prolonged conversation with the patient regarding the hospital course, differential diagnosis, results of diagnostic tests this far, and therapeutic modalities available. Plan of care discussed with the patient after the evaluation. Patient expresses a clear understanding of the plan of care.    Andrew Cleaning D.O.  Gastroenterology and Hepatology  [address] [place] [address], [address]  [place], [place]  Office: [phone]   73 year old female with respiratory failure s/p trach and PEG, sacral osteomyelitis.     leakage from chronic PEG site, improved.   - s/p trial topical silver nitrate   - on decreased rate of tube feeds / administer TF and meds by gravity if feasible  - cont PPI      I had a prolonged conversation with the patient regarding the hospital course, differential diagnosis, results of diagnostic tests this far, and therapeutic modalities available. Plan of care discussed with the patient after the evaluation. Patient expresses a clear understanding of the plan of care.    Andrew Cleaning D.O.  Gastroenterology and Hepatology  [address] [place] [address], [address]  [place], [place]  Office: [phone]

## 2023-12-22 NOTE — PROGRESS NOTE ADULT - NS ATTEND AMEND GEN_ALL_CORE FT
73F with a h/o DM, HTN, HLD, anemia, hypothyroidism, RA, fibromyalgia, remote cerebral aneurysm with repair, hypercapnic respiratory failure s/p tracheostomy/PEG, bedbound, sacral pressure ulcer. Admitted w/ bleeding from tracheostomy and PEG w/ associated abdominal pain, recent hx of auditory/visual hallucinations.   Imaging showed mild thickening along PEG tube tract and sacral ulcer extending to coccyx suggestive of acute osteomyelitis.      #Neuro - awake, alert, and interactive. moving all extremities, mouthing words. continue current medications  #Cardiovascular - hemodynamically stable  #Pulmonary - chronic hypoxemic respiratory failure, has 8XLT trach, on home vent, pressure support trials daily 15/5. No further hemoptysis after TXA nebs, Minimize suctioning as needed  #Gastro - oropharyngeal dysphagia with PEG tube in place, tolerating feeds, nutrition follow up appreciated   Appreciate GI eval of PEG site - stoma cauterized with silver nitrate 12/20.   #ID - sacral osteo on MRI - wound care follow-up appreciated, c/w offloading and local wound care  - s/p 6 week course of Cipro and Keflex as per ID - completed 12/10/23  - Sputum colonized with MDR Pseudomonas   Febrile O/N 12/19 - unclear etiology, UA negative, CXR and secretions unchanged. RVP and LE dopplers neg. Minimal secretions. Strept throat culture negative. No further fevers. Hold further antibiotics. Appreciate ID follow up.   #Hem/Onc - prior cytopenias in setting of antibiotics per patient's daughter   - Hem/Onc follow-up noted - suspect anemia of chronic disease  #Pain - continue current pain control - in setting of fibromyalgia and pain from wound  - Voltaren topical, lidocaine patches and low dose Oxycodone as needed  #Derm - previously seen by derm for skin lesions - mid back with irritated seborrheic keratosis - outpatient follow-up  #Endo - DM2 - continue insulin and adjust as needed for goal FS < 180  - Endocrine follow-up appreciated   - Continue Synthroid - TSH WNL  #DVT proph - SCDs  - Prophylactic AC stopped after concern for prior bleeding.  Discharge planning.

## 2023-12-22 NOTE — PROGRESS NOTE ADULT - ASSESSMENT
73 F PMH T2DM (A1c 7.5), HTN, HLD, anemia, hypothyroidism, RA, fibromyalgia, remote cerebral aneurysm repair, respiratory failure s/p Trach, s/p PEG, bedbound (trach change 8/18, PEG change 8/14), sacral wound, brought in for bleeding from trach and PEG site  Bleeding from trach  No fever, no leukocytosis initially  RVP neg  CT with PEG, sacral ulcer with osseous remodeling, patchy consolidative opacities  CXR with pneumonia  Sputum culture MSSA, pseudomonas  Treat for pneumonia  MR with evidence acute OM  Note wound care eval generally reassuring  Prolonged hospitalization, now with  Pseudomonas in sputum  Had low grade fever, however resp status is improved compared to prior; no secretions, no increased O2 requirements  Still with pseudomonas on trach on recent cultures  S/p completed prolonged course antibiotic for sacral wound/OM on this visit    Was called back for fever, no focal change otherwise  CXR 12/19 with pleural effusion/atelectasis  BCXs NGTD  UCX neg, UA neg  RVP neg  Isolated aspiration event? Fever now resolved?  Overall, PNA/tracheitis, bleeding from trach, sacral wound  - Monitor off abx for now, low threshold to restart Cefepime if clinical worsening or signs pna/tracheitis  - Wound care to sacral wound  - Monitor for further signs infection  - F/U BCXs  - Trend CBC/monitor for further fevers    Signing off. Please call with further questions or change in status.    Thomas Ahn MD  Contact on TEAMS messaging from 9am - 5pm  From 5pm-9am, on weekends, or if no response call [phone] 73 F PMH T2DM (A1c 7.5), HTN, HLD, anemia, hypothyroidism, RA, fibromyalgia, remote cerebral aneurysm repair, respiratory failure s/p Trach, s/p PEG, bedbound (trach change 8/18, PEG change 8/14), sacral wound, brought in for bleeding from trach and PEG site  Bleeding from trach  No fever, no leukocytosis initially  RVP neg  CT with PEG, sacral ulcer with osseous remodeling, patchy consolidative opacities  CXR with pneumonia  Sputum culture MSSA, pseudomonas  Treat for pneumonia  MR with evidence acute OM  Note wound care eval generally reassuring  Prolonged hospitalization, now with  Pseudomonas in sputum  Had low grade fever, however resp status is improved compared to prior; no secretions, no increased O2 requirements  Still with pseudomonas on trach on recent cultures  S/p completed prolonged course antibiotic for sacral wound/OM on this visit    Was called back for fever, no focal change otherwise  CXR 12/19 with pleural effusion/atelectasis  BCXs NGTD  UCX neg, UA neg  RVP neg  Isolated aspiration event? Fever now resolved?  Overall, PNA/tracheitis, bleeding from trach, sacral wound  - Monitor off abx for now, low threshold to restart Cefepime if clinical worsening or signs pna/tracheitis  - Wound care to sacral wound  - Monitor for further signs infection  - F/U BCXs  - Trend CBC/monitor for further fevers    Signing off. Please call with further questions or change in status.    Thomas Ahn MD  Contact on TEAMS messaging from 9am - 5pm  From 5pm-9am, on weekends, or if no response call [phone]

## 2023-12-22 NOTE — PROGRESS NOTE ADULT - SUBJECTIVE AND OBJECTIVE BOX
Patient is a 73y old  Female who presents with a chief complaint of PEG bleeding.      Interval Events:      REVIEW OF SYSTEMS:  [ ] Positive  [ ] All other systems negative  [ ] Unable to assess ROS because ________      Vital Signs Last 24 Hrs  T(C): 36.7 (12-22-23 @ 06:00), Max: 37.1 (12-21-23 @ 21:18)  T(F): 98 (12-22-23 @ 06:00), Max: 98.7 (12-21-23 @ 21:18)  HR: 87 (12-22-23 @ 06:00) (78 - 91)  BP: 138/68 (12-22-23 @ 06:00) (111/64 - 143/71)  RR: 17 (12-22-23 @ 06:00) (16 - 20)  SpO2: 100% (12-22-23 @ 06:00) (100% - 100%)    PHYSICAL EXAM:  HEENT:   [ ]Tracheostomy:  [ ]Pupils equal  [ ]No oral lesions  [ ]Abnormal    SKIN  [ ]No Rash  [ ] Abnormal  [ ] pressure    CARDIAC  [ ]Regular  [ ]Abnormal    PULMONARY  [ ]Bilateral Clear Breath Sounds  [ ]Normal Excursion  [ ]Abnormal    GI  [ ]PEG      [ ] +BS		              [ ]Soft, nondistended, nontender	  [ ]Abnormal    MUSCULOSKELETAL                                   [ ]Bedbound                 [ ]Abnormal    [ ]Ambulatory/OOB to chair                           EXTREMITIES                                         [ ]Normal  [ ]Edema                           NEUROLOGIC  [ ] Normal, non focal  [ ] Focal findings:    PSYCHIATRIC  [ ]Alert and appropriate  [ ] Sedated	 [ ]Agitated    :  Mcbride: [ ] Yes, if yes: Date of Placement:                   [  ] No    LINES: Central Lines [ ] Yes, if yes: Date of Placement                                     [  ] No    HOSPITAL MEDICATIONS:  MEDICATIONS  (STANDING):  albuterol/ipratropium for Nebulization 3 milliLiter(s) Nebulizer every 6 hours  amLODIPine   Tablet 10 milliGRAM(s) Oral daily  artificial  tears Solution 2 Drop(s) Both EYES four times a day  ascorbic acid 500 milliGRAM(s) Oral daily  calcium carbonate    500 mG (Tums) Chewable 1 Tablet(s) Chew every 12 hours  chlorhexidine 0.12% Liquid 15 milliLiter(s) Oral Mucosa every 12 hours  chlorhexidine 4% Liquid 1 Application(s) Topical <User Schedule>  dextrose 50% Injectable 12.5 Gram(s) IV Push once  dextrose 50% Injectable 50 milliLiter(s) IV Push once  dextrose 50% Injectable 25 Gram(s) IV Push once  escitalopram 10 milliGRAM(s) Oral <User Schedule>  fentaNYL   Patch  25 MICROgram(s)/Hr 1 Patch Transdermal every 72 hours  gabapentin Solution 100 milliGRAM(s) Enteral Tube at bedtime  glucagon  Injectable 1 milliGRAM(s) IntraMuscular once  hemorrhoidal Ointment 1 Application(s) Rectal daily  hemorrhoidal Ointment      insulin glargine Injectable (LANTUS) 18 Unit(s) SubCutaneous every morning  insulin lispro (ADMELOG) corrective regimen sliding scale   SubCutaneous every 6 hours  insulin regular  human recombinant 11 Unit(s) SubCutaneous <User Schedule>  insulin regular  human recombinant 9 Unit(s) SubCutaneous <User Schedule>  insulin regular  human recombinant 25 Unit(s) SubCutaneous <User Schedule>  levothyroxine 125 MICROGram(s) Oral <User Schedule>  lidocaine   4% Patch 1 Patch Transdermal daily  lidocaine 2% Viscous 5 milliLiter(s) Mucosal two times a day  melatonin 1 milliGRAM(s) Oral at bedtime  melatonin 5 milliGRAM(s) Oral at bedtime  multivitamin/minerals/iron Oral Solution (CENTRUM) 15 milliLiter(s) Oral daily  nystatin Powder 1 Application(s) Topical every 8 hours  pantoprazole   Suspension 40 milliGRAM(s) Enteral Tube <User Schedule>  petrolatum Ophthalmic Ointment 1 Application(s) Both EYES four times a day  predniSONE   Tablet 5 milliGRAM(s) Oral daily  QUEtiapine 12.5 milliGRAM(s) Oral <User Schedule>  silver nitrate Applicator 1 Application(s) Topical once  simethicone 80 milliGRAM(s) Chew four times a day  zinc oxide 40% Paste 1 Application(s) Topical daily    MEDICATIONS  (PRN):  acetaminophen   Oral Liquid .. 1000 milliGRAM(s) Enteral Tube every 6 hours PRN Temp greater or equal to 38C (100.4F), Mild Pain (1 - 3)  benzocaine 20% Spray 1 Spray(s) Topical four times a day PRN Cough  diclofenac sodium 1% Gel 2 Gram(s) Topical four times a day PRN pain  diphenhydrAMINE Elixir 25 milliGRAM(s) Oral every 6 hours PRN Rash and/or Itching  guaifenesin/dextromethorphan Oral Liquid 10 milliLiter(s) Oral every 6 hours PRN Cough  oxyCODONE    Solution 2.5 milliGRAM(s) Oral every 6 hours PRN Moderate Pain (4 - 6)  sodium chloride 0.65% Nasal 1 Spray(s) Both Nostrils every 6 hours PRN Nasal Congestion      LABS:                  CAPILLARY BLOOD GLUCOSE    MICROBIOLOGY:     RADIOLOGY:  [ ] Reviewed and interpreted by me    Mode: AC/ CMV (Assist Control/ Continuous Mandatory Ventilation)  RR (machine): 12  TV (machine): 300  FiO2: 30  PEEP: 5  ITime: 1  MAP: 9  PIP: 26   Patient is a 73y old Female who presents with a chief complaint of PEG bleeding.      Interval Events: Leakage around PEG noted to be improved. Also, noted with some blood in suction canister mixed with secretions.      REVIEW OF SYSTEMS:  [x] Unable to assess ROS because vented.      Vital Signs Last 24 Hrs  T(C): 36.7 (12-22-23 @ 06:00), Max: 37.1 (12-21-23 @ 21:18)  T(F): 98 (12-22-23 @ 06:00), Max: 98.7 (12-21-23 @ 21:18)  HR: 87 (12-22-23 @ 06:00) (78 - 91)  BP: 138/68 (12-22-23 @ 06:00) (111/64 - 143/71)  RR: 17 (12-22-23 @ 06:00) (16 - 20)  SpO2: 100% (12-22-23 @ 06:00) (100% - 100%)    PHYSICAL EXAM:  HEENT:   [x]Tracheostomy: 8 XLT  [x]Pupils equal  [ ]No oral lesions  [ ]Abnormal    SKIN  [x]No Rash  [ ] Abnormal  [ ] pressure    CARDIAC  [x]Regular  [ ]Abnormal    PULMONARY  [ ]Bilateral Clear Breath Sounds  [ ]Normal Excursion  [x]Abnormal- Coarse lung sounds bilaterally    GI  [x]PEG      [x] +BS		              [x]Soft, nondistended, nontender	  [ ]Abnormal    MUSCULOSKELETAL                                   [x]Bedbound                 [ ]Abnormal    [ ]Ambulatory/OOB to chair                           EXTREMITIES                                         [x]Normal  [ ]Edema                           NEUROLOGIC  Opens eyes and follows simple commands  [ ] Normal, non focal  [ ] Focal findings:    PSYCHIATRIC  - Unable to assess as pt vent dependent  [ ]Alert and appropriate  [ ] Sedated	 [ ]Agitated    :  Mcbride: [ ] Yes, if yes: Date of Placement:                   [x ] No    LINES: Central Lines [ ] Yes, if yes: Date of Placement                                     [ x] No    HOSPITAL MEDICATIONS:  MEDICATIONS  (STANDING):  albuterol/ipratropium for Nebulization 3 milliLiter(s) Nebulizer every 6 hours  amLODIPine   Tablet 10 milliGRAM(s) Oral daily  artificial  tears Solution 2 Drop(s) Both EYES four times a day  ascorbic acid 500 milliGRAM(s) Oral daily  calcium carbonate    500 mG (Tums) Chewable 1 Tablet(s) Chew every 12 hours  chlorhexidine 0.12% Liquid 15 milliLiter(s) Oral Mucosa every 12 hours  chlorhexidine 4% Liquid 1 Application(s) Topical <User Schedule>  dextrose 50% Injectable 12.5 Gram(s) IV Push once  dextrose 50% Injectable 50 milliLiter(s) IV Push once  dextrose 50% Injectable 25 Gram(s) IV Push once  escitalopram 10 milliGRAM(s) Oral <User Schedule>  fentaNYL   Patch  25 MICROgram(s)/Hr 1 Patch Transdermal every 72 hours  gabapentin Solution 100 milliGRAM(s) Enteral Tube at bedtime  glucagon  Injectable 1 milliGRAM(s) IntraMuscular once  hemorrhoidal Ointment 1 Application(s) Rectal daily  hemorrhoidal Ointment      insulin glargine Injectable (LANTUS) 18 Unit(s) SubCutaneous every morning  insulin lispro (ADMELOG) corrective regimen sliding scale   SubCutaneous every 6 hours  insulin regular  human recombinant 11 Unit(s) SubCutaneous <User Schedule>  insulin regular  human recombinant 9 Unit(s) SubCutaneous <User Schedule>  insulin regular  human recombinant 25 Unit(s) SubCutaneous <User Schedule>  levothyroxine 125 MICROGram(s) Oral <User Schedule>  lidocaine   4% Patch 1 Patch Transdermal daily  lidocaine 2% Viscous 5 milliLiter(s) Mucosal two times a day  melatonin 1 milliGRAM(s) Oral at bedtime  melatonin 5 milliGRAM(s) Oral at bedtime  multivitamin/minerals/iron Oral Solution (CENTRUM) 15 milliLiter(s) Oral daily  nystatin Powder 1 Application(s) Topical every 8 hours  pantoprazole   Suspension 40 milliGRAM(s) Enteral Tube <User Schedule>  petrolatum Ophthalmic Ointment 1 Application(s) Both EYES four times a day  predniSONE   Tablet 5 milliGRAM(s) Oral daily  QUEtiapine 12.5 milliGRAM(s) Oral <User Schedule>  silver nitrate Applicator 1 Application(s) Topical once  simethicone 80 milliGRAM(s) Chew four times a day  zinc oxide 40% Paste 1 Application(s) Topical daily    MEDICATIONS  (PRN):  acetaminophen   Oral Liquid .. 1000 milliGRAM(s) Enteral Tube every 6 hours PRN Temp greater or equal to 38C (100.4F), Mild Pain (1 - 3)  benzocaine 20% Spray 1 Spray(s) Topical four times a day PRN Cough  diclofenac sodium 1% Gel 2 Gram(s) Topical four times a day PRN pain  diphenhydrAMINE Elixir 25 milliGRAM(s) Oral every 6 hours PRN Rash and/or Itching  guaifenesin/dextromethorphan Oral Liquid 10 milliLiter(s) Oral every 6 hours PRN Cough  oxyCODONE    Solution 2.5 milliGRAM(s) Oral every 6 hours PRN Moderate Pain (4 - 6)  sodium chloride 0.65% Nasal 1 Spray(s) Both Nostrils every 6 hours PRN Nasal Congestion      LABS:        CAPILLARY BLOOD GLUCOSE    MICROBIOLOGY:     RADIOLOGY:  [ ] Reviewed and interpreted by me    Mode: AC/ CMV (Assist Control/ Continuous Mandatory Ventilation)  RR (machine): 12  TV (machine): 300  FiO2: 30  PEEP: 5  ITime: 1  MAP: 9  PIP: 26

## 2023-12-22 NOTE — PROGRESS NOTE ADULT - SUBJECTIVE AND OBJECTIVE BOX
CC: F/U Fever    Saw/spoke to patient. Unchanged. No new events.    Allergies  penicillin (Unknown)  heparin (Unknown)  Tagamet (Unknown)  pineapple (Unknown)  walnut (Unknown)  Pecan, Filbert, Hazelnut (Unknown)  Lyrica (Unknown)    ANTIMICROBIALS:      PE:    Vital Signs Last 24 Hrs  T(C): 37.3 (22 Dec 2023 09:46), Max: 37.3 (22 Dec 2023 09:46)  T(F): 99.1 (22 Dec 2023 09:46), Max: 99.1 (22 Dec 2023 09:46)  HR: 101 (22 Dec 2023 12:09) (78 - 105)  BP: 114/47 (22 Dec 2023 09:46) (111/64 - 143/71)  RR: 23 (22 Dec 2023 09:46) (16 - 23)  SpO2: 100% (22 Dec 2023 12:09) (100% - 100%)    Gen: AOx3, NAD, non-toxic  CV: Nontachycardic  Resp: Breathing comfortably, RA  Abd: Soft, nontender  IV/Skin: No thrombophlebitis    LABS:    No new available    MICROBIOLOGY:    Catheterized Catheterized  12-19-23   <10,000 CFU/mL Normal Urogenital Shanda  --  --    .Blood Blood-Peripheral  12-19-23   No growth at 48 Hours  --  --    .Blood Blood-Peripheral  12-19-23   No growth at 48 Hours  --  Pseudomonas aeruginosa    .Blood Blood-Peripheral  12-15-23   No growth at 5 days  --  --    .Blood Blood-Peripheral  12-14-23   No growth at 5 days  --  --    Clean Catch Clean Catch (Midstream)  11-26-23   10,000 - 49,000 CFU/mL Enterococcus faecium (vancomycin resistant)  --  Enterococcus faecium (vancomycin resistant)    .Sputum Sputum  11-26-23   Numerous Pseudomonas aeruginosa  Normal Respiratory Shanda present  --  Pseudomonas aeruginosa    .Blood Blood-Peripheral  11-26-23   No growth at 5 days  --  --    .Blood Blood-Peripheral  11-26-23   No growth at 5 days  --  --    Rapid RVP Result: NotDetec (12-20 @ 12:19)    RADIOLOGY:    12/19    FINDINGS:  Lines and tubes: Tracheostomy tube terminates above the nick.  Heart/Vascular: Heart size is poorly assessed on this projection  Pulmonary: Stable small left greater than right pleural effusions with   bibasilar opacities. No pneumothorax.  Bones: No acute osseous abnormalities    IMPRESSION:  Stable small pleural effusions with associated atelectasis.

## 2023-12-22 NOTE — PROGRESS NOTE ADULT - SUBJECTIVE AND OBJECTIVE BOX
Seen earlier today     Chief Complaint: Diabetes Mellitus follow up    INTERVAL HX: Last 24 hour BGs variable 100s-218 with hyperglycemia at noon ( 200s for 3 days ). Tolerating tube feeding Casie Frazier glucose support 1.2 at goal 60 ml /hr without nausea, vomiting or diarrhea. pt is awake and nods yes or no to answer. Daughter express concern for the leaking around PEG. Waiting for GI evaluation.         Review of Systems:  General: As above  GI: No nausea, vomiting  Endocrine: no  S&Sx of hypoglycemia    Allergies    penicillin (Unknown)  heparin (Unknown)  Tagamet (Unknown)  pineapple (Unknown)  walnut (Unknown)  Pecan, Filbert, Hazelnut (Unknown)  Lyrica (Unknown)    Intolerances      MEDICATIONS  (STANDING):  albuterol/ipratropium for Nebulization 3 milliLiter(s) Nebulizer every 6 hours  amLODIPine   Tablet 10 milliGRAM(s) Oral daily  artificial  tears Solution 2 Drop(s) Both EYES four times a day  ascorbic acid 500 milliGRAM(s) Oral daily  calcium carbonate    500 mG (Tums) Chewable 1 Tablet(s) Chew every 12 hours  chlorhexidine 0.12% Liquid 15 milliLiter(s) Oral Mucosa every 12 hours  chlorhexidine 4% Liquid 1 Application(s) Topical <User Schedule>  dextrose 50% Injectable 12.5 Gram(s) IV Push once  dextrose 50% Injectable 25 Gram(s) IV Push once  dextrose 50% Injectable 50 milliLiter(s) IV Push once  escitalopram 10 milliGRAM(s) Oral <User Schedule>  fentaNYL   Patch  25 MICROgram(s)/Hr 1 Patch Transdermal every 72 hours  gabapentin Solution 100 milliGRAM(s) Enteral Tube at bedtime  glucagon  Injectable 1 milliGRAM(s) IntraMuscular once  hemorrhoidal Ointment      hemorrhoidal Ointment 1 Application(s) Rectal daily  insulin glargine Injectable (LANTUS) 18 Unit(s) SubCutaneous every morning  insulin lispro (ADMELOG) corrective regimen sliding scale   SubCutaneous every 6 hours  insulin regular  human recombinant 9 Unit(s) SubCutaneous <User Schedule>  insulin regular  human recombinant 11 Unit(s) SubCutaneous <User Schedule>  insulin regular  human recombinant 25 Unit(s) SubCutaneous <User Schedule>  levothyroxine 125 MICROGram(s) Oral <User Schedule>  lidocaine   4% Patch 1 Patch Transdermal daily  lidocaine   4% Patch 1 Patch Transdermal daily  lidocaine 2% Viscous 5 milliLiter(s) Mucosal two times a day  melatonin 5 milliGRAM(s) Oral at bedtime  melatonin 1 milliGRAM(s) Oral at bedtime  multivitamin/minerals/iron Oral Solution (CENTRUM) 15 milliLiter(s) Oral daily  nystatin Powder 1 Application(s) Topical every 8 hours  pantoprazole   Suspension 40 milliGRAM(s) Enteral Tube <User Schedule>  petrolatum Ophthalmic Ointment 1 Application(s) Both EYES four times a day  predniSONE   Tablet 5 milliGRAM(s) Oral daily  QUEtiapine 12.5 milliGRAM(s) Oral <User Schedule>  silver nitrate Applicator 1 Application(s) Topical once  simethicone 80 milliGRAM(s) Chew four times a day  zinc oxide 40% Paste 1 Application(s) Topical daily        insulin glargine Injectable (LANTUS)   18 Unit(s) SubCutaneous (12-22-23 @ 07:44)    insulin lispro (ADMELOG) corrective regimen sliding scale   2 Unit(s) SubCutaneous (12-22-23 @ 11:38)   1 Unit(s) SubCutaneous (12-21-23 @ 23:59)   1 Unit(s) SubCutaneous (12-21-23 @ 17:57)    insulin regular  human recombinant   9 Unit(s) SubCutaneous (12-21-23 @ 17:56)    insulin regular  human recombinant   25 Unit(s) SubCutaneous (12-22-23 @ 05:40)    insulin regular  human recombinant   11 Unit(s) SubCutaneous (12-22-23 @ 11:39)    levothyroxine   125 MICROGram(s) Oral (12-22-23 @ 05:07)    predniSONE   Tablet   5 milliGRAM(s) Oral (12-22-23 @ 05:32)        PHYSICAL EXAM:  VITALS: T(C): 37.3 (12-22-23 @ 09:46)  T(F): 99.1 (12-22-23 @ 09:46), Max: 99.1 (12-22-23 @ 09:46)  HR: 101 (12-22-23 @ 12:09) (78 - 105)  BP: 114/47 (12-22-23 @ 09:46) (111/64 - 143/71)  RR:  (16 - 23)  SpO2:  (100% - 100%)  Wt(kg): --  GENERAL: Female laying in bed, in NAD  Respiratory: +Trach, on ventilator   Extremities: Warm, no edema  NEURO: Alert , nods yes or no to answer.     LABS:  POCT Blood Glucose.: 218 mg/dL (12-22-23 @ 11:26)  POCT Blood Glucose.: 170 mg/dL (12-22-23 @ 07:42)  POCT Blood Glucose.: 137 mg/dL (12-22-23 @ 05:32)  POCT Blood Glucose.: 183 mg/dL (12-21-23 @ 23:55)  POCT Blood Glucose.: 165 mg/dL (12-21-23 @ 17:37)  POCT Blood Glucose.: 237 mg/dL (12-21-23 @ 11:16)  POCT Blood Glucose.: 253 mg/dL (12-21-23 @ 05:57)  POCT Blood Glucose.: 161 mg/dL (12-20-23 @ 23:46)  POCT Blood Glucose.: 190 mg/dL (12-20-23 @ 17:37)  POCT Blood Glucose.: 265 mg/dL (12-20-23 @ 12:26)  POCT Blood Glucose.: 146 mg/dL (12-20-23 @ 05:18)  POCT Blood Glucose.: 237 mg/dL (12-19-23 @ 23:48)  POCT Blood Glucose.: 181 mg/dL (12-19-23 @ 17:36)                          8.6    10.20 )-----------( 94       ( 20 Dec 2023 04:13 )             28.2             11-25 Chol -- Direct LDL -- LDL calculated -- HDL -- Trig 192<H>  Thyroid Function Tests:  12-04 @ 07:01 TSH 1.57 FreeT4 -- T3 60 Anti TPO -- Anti Thyroglobulin Ab -- TSI --  11-28 @ 06:37 TSH 2.80 FreeT4 1.2 T3 -- Anti TPO -- Anti Thyroglobulin Ab -- TSI --      A1C with Estimated Average Glucose Result: 7.5 % (10-28-23 @ 07:18)    Estimated Average Glucose: 169 mg/dL (10-28-23 @ 07:18)        Diet, NPO with Tube Feed:   Tube Feeding Modality: Gastrostomy  [facility] glucose support 1.2  Total Volume for 24 Hours (mL): 1200  Continuous  Starting Tube Feed Rate mL per Hour: 60  Increase Tube Feed Rate by (mL): 0  Until Goal Tube Feed Rate (mL per Hour): 60  Tube Feed Duration (in Hours): 20  Tube Feed Start Time: 06:00  Tube Feed Stop Time: 02:00  Free Water Flush  Pump   Rate (mL per Hour): 10   Frequency: Every 2 Hours  Alfred(7 Gm Arginine/7 Gm Glut/1.2 Gm HMB     Qty per Day:  2 (11-29-23 @ 14:12) [Active]

## 2023-12-23 LAB
GLUCOSE BLDC GLUCOMTR-MCNC: 200 MG/DL — HIGH (ref 70–99)
GLUCOSE BLDC GLUCOMTR-MCNC: 200 MG/DL — HIGH (ref 70–99)
GLUCOSE BLDC GLUCOMTR-MCNC: 204 MG/DL — HIGH (ref 70–99)
GLUCOSE BLDC GLUCOMTR-MCNC: 204 MG/DL — HIGH (ref 70–99)
GLUCOSE BLDC GLUCOMTR-MCNC: 211 MG/DL — HIGH (ref 70–99)
GLUCOSE BLDC GLUCOMTR-MCNC: 211 MG/DL — HIGH (ref 70–99)
GLUCOSE BLDC GLUCOMTR-MCNC: 215 MG/DL — HIGH (ref 70–99)
GLUCOSE BLDC GLUCOMTR-MCNC: 215 MG/DL — HIGH (ref 70–99)
GLUCOSE BLDC GLUCOMTR-MCNC: 219 MG/DL — HIGH (ref 70–99)
GLUCOSE BLDC GLUCOMTR-MCNC: 219 MG/DL — HIGH (ref 70–99)
GLUCOSE BLDC GLUCOMTR-MCNC: 230 MG/DL — HIGH (ref 70–99)
GLUCOSE BLDC GLUCOMTR-MCNC: 230 MG/DL — HIGH (ref 70–99)

## 2023-12-23 PROCEDURE — 99233 SBSQ HOSP IP/OBS HIGH 50: CPT | Mod: FS

## 2023-12-23 RX ORDER — INSULIN HUMAN 100 [IU]/ML
13 INJECTION, SOLUTION SUBCUTANEOUS
Refills: 0 | Status: DISCONTINUED | OUTPATIENT
Start: 2023-12-23 | End: 2023-12-25

## 2023-12-23 RX ADMIN — Medication 5 MILLIGRAM(S): at 21:24

## 2023-12-23 RX ADMIN — GABAPENTIN 100 MILLIGRAM(S): 400 CAPSULE ORAL at 21:23

## 2023-12-23 RX ADMIN — INSULIN HUMAN 11 UNIT(S): 100 INJECTION, SOLUTION SUBCUTANEOUS at 12:35

## 2023-12-23 RX ADMIN — Medication 2 DROP(S): at 12:36

## 2023-12-23 RX ADMIN — Medication 500 MILLIGRAM(S): at 12:36

## 2023-12-23 RX ADMIN — NYSTATIN CREAM 1 APPLICATION(S): 100000 CREAM TOPICAL at 06:10

## 2023-12-23 RX ADMIN — INSULIN HUMAN 9 UNIT(S): 100 INJECTION, SOLUTION SUBCUTANEOUS at 18:17

## 2023-12-23 RX ADMIN — Medication 3 MILLILITER(S): at 17:08

## 2023-12-23 RX ADMIN — SIMETHICONE 80 MILLIGRAM(S): 80 TABLET, CHEWABLE ORAL at 06:09

## 2023-12-23 RX ADMIN — Medication 1000 MILLIGRAM(S): at 13:37

## 2023-12-23 RX ADMIN — Medication 1 TABLET(S): at 18:19

## 2023-12-23 RX ADMIN — LIDOCAINE 5 MILLILITER(S): 4 CREAM TOPICAL at 06:11

## 2023-12-23 RX ADMIN — Medication 1000 MILLIGRAM(S): at 14:30

## 2023-12-23 RX ADMIN — NYSTATIN CREAM 1 APPLICATION(S): 100000 CREAM TOPICAL at 13:37

## 2023-12-23 RX ADMIN — Medication 1 APPLICATION(S): at 06:11

## 2023-12-23 RX ADMIN — LIDOCAINE 1 PATCH: 4 CREAM TOPICAL at 12:37

## 2023-12-23 RX ADMIN — ZINC OXIDE 1 APPLICATION(S): 200 OINTMENT TOPICAL at 12:39

## 2023-12-23 RX ADMIN — CHLORHEXIDINE GLUCONATE 15 MILLILITER(S): 213 SOLUTION TOPICAL at 06:11

## 2023-12-23 RX ADMIN — Medication 1 APPLICATION(S): at 12:37

## 2023-12-23 RX ADMIN — Medication 3 MILLILITER(S): at 11:16

## 2023-12-23 RX ADMIN — CHLORHEXIDINE GLUCONATE 1 APPLICATION(S): 213 SOLUTION TOPICAL at 06:11

## 2023-12-23 RX ADMIN — PHENYLEPHRINE-SHARK LIVER OIL-MINERAL OIL-PETROLATUM RECTAL OINTMENT 1 APPLICATION(S): at 12:38

## 2023-12-23 RX ADMIN — Medication 1 MILLIGRAM(S): at 21:24

## 2023-12-23 RX ADMIN — LIDOCAINE 1 PATCH: 4 CREAM TOPICAL at 02:48

## 2023-12-23 RX ADMIN — PANTOPRAZOLE SODIUM 40 MILLIGRAM(S): 20 TABLET, DELAYED RELEASE ORAL at 07:56

## 2023-12-23 RX ADMIN — SIMETHICONE 80 MILLIGRAM(S): 80 TABLET, CHEWABLE ORAL at 12:36

## 2023-12-23 RX ADMIN — Medication 2 DROP(S): at 06:11

## 2023-12-23 RX ADMIN — Medication 2: at 18:18

## 2023-12-23 RX ADMIN — INSULIN HUMAN 13 UNIT(S): 100 INJECTION, SOLUTION SUBCUTANEOUS at 18:35

## 2023-12-23 RX ADMIN — QUETIAPINE FUMARATE 12.5 MILLIGRAM(S): 200 TABLET, FILM COATED ORAL at 18:19

## 2023-12-23 RX ADMIN — Medication 3 MILLILITER(S): at 23:15

## 2023-12-23 RX ADMIN — SIMETHICONE 80 MILLIGRAM(S): 80 TABLET, CHEWABLE ORAL at 18:19

## 2023-12-23 RX ADMIN — Medication 15 MILLILITER(S): at 12:36

## 2023-12-23 RX ADMIN — INSULIN HUMAN 26 UNIT(S): 100 INJECTION, SOLUTION SUBCUTANEOUS at 06:45

## 2023-12-23 RX ADMIN — Medication 3 MILLILITER(S): at 06:04

## 2023-12-23 RX ADMIN — Medication 2: at 00:21

## 2023-12-23 RX ADMIN — AMLODIPINE BESYLATE 10 MILLIGRAM(S): 2.5 TABLET ORAL at 06:09

## 2023-12-23 RX ADMIN — Medication 5 MILLIGRAM(S): at 06:09

## 2023-12-23 RX ADMIN — LIDOCAINE 5 MILLILITER(S): 4 CREAM TOPICAL at 18:19

## 2023-12-23 RX ADMIN — Medication 125 MICROGRAM(S): at 06:12

## 2023-12-23 RX ADMIN — Medication 10 MILLILITER(S): at 12:36

## 2023-12-23 RX ADMIN — NYSTATIN CREAM 1 APPLICATION(S): 100000 CREAM TOPICAL at 21:23

## 2023-12-23 RX ADMIN — CHLORHEXIDINE GLUCONATE 15 MILLILITER(S): 213 SOLUTION TOPICAL at 18:20

## 2023-12-23 RX ADMIN — Medication 1 TABLET(S): at 06:10

## 2023-12-23 RX ADMIN — ESCITALOPRAM OXALATE 10 MILLIGRAM(S): 10 TABLET, FILM COATED ORAL at 07:56

## 2023-12-23 RX ADMIN — Medication 2 DROP(S): at 18:18

## 2023-12-23 RX ADMIN — INSULIN GLARGINE 18 UNIT(S): 100 INJECTION, SOLUTION SUBCUTANEOUS at 07:56

## 2023-12-23 RX ADMIN — Medication 1 APPLICATION(S): at 18:18

## 2023-12-23 RX ADMIN — Medication 2: at 06:45

## 2023-12-23 RX ADMIN — Medication 1: at 12:35

## 2023-12-23 NOTE — PROGRESS NOTE ADULT - SUBJECTIVE AND OBJECTIVE BOX
Patient is a 73y old  Female who presents with a chief complaint of PEG Bleeding (22 Dec 2023 07:35)    HPI:  72 y/o F with PMHx of T2DM, HTN, HLD, anemia, hypothyroidism, RA, fibromyalgia, remote cerebral aneurysm repair, acute hypercapnic respiratory failure now with tracheostomy, PEG tube, and bedbound (trach change 8/18, PEG change 8/14), sacral pressure ulcer, BIBEMS from home for 6 day hx of bleeding from the tracheostomy and PEG tube with associated abdominal pain. Patient still having regular bowel movements, last one occurred prior to ED arrival. Was seen outpatient on 10/26 by critical care Dr. Zaki Gottlieb and apparently had cultures sent at that time. Patient also noted to have chronic sacral decubitous ulcer. Family endorsed one episode of fever, though was not febrile on evaluation. Daughter noted patient was recently experiencing auditory and visual hallucinations (seeing animals that were not there & hearing a "bomb" going off outside her home), though patient not actively hallucinating on evaluation.    ED course notable for CT neck imaging showing no evidence of active bleeding around the tracheostomy site, no hematomas, and no drainable collection around the tracheostomy site. CT abdomen/pelvis notable for gastrostomy tube localized to the stomach with mild thickening noted along the tract; a sacral decubitus ulcer extending to the coccyx with osseous remodeling, possibly representing osteomyelitis; and bibasilar patchy opacities with volume loss in the lungs, representing atelectasis vs infection. Patient admitted for respiratory support, wound care of tracheostomy and PEG, and management of presumed sacral osteomyelitis.   (28 Oct 2023 04:18)    Interval Events:    REVIEW OF SYSTEMS:  [ ] Positive  [ ] All other systems negative  [ ] Unable to assess ROS because ________    Vital Signs Last 24 Hrs  T(C): 36.8 (12-22-23 @ 18:03), Max: 37.3 (12-22-23 @ 09:46)  T(F): 98.3 (12-22-23 @ 18:03), Max: 99.1 (12-22-23 @ 09:46)  HR: 104 (12-23-23 @ 06:18) (93 - 113)  BP: 135/63 (12-22-23 @ 18:03) (114/47 - 146/68)  RR: 22 (12-22-23 @ 18:03) (22 - 25)  SpO2: 100% (12-23-23 @ 06:18) (97% - 100%)    PHYSICAL EXAM:  HEENT:   [ ]Tracheostomy:  [ ]Pupils equal  [ ]No oral lesions  [ ]Abnormal    SKIN  [ ] No Rash  [ ] Abnormal  [ ] pressure    CARDIAC  [ ]Regular  [ ]Abnormal    PULMONARY  [ ]Bilateral Clear Breath Sounds  [ ]Normal Excursion  [ ]Abnormal    GI  [ ]PEG      [ ] +BS		              [ ]Soft, nondistended, nontender	  [ ]Abnormal    MUSCULOSKELETAL                                   [ ]Bedbound                 [ ]Abnormal    [ ]Ambulatory/OOB to chair                           EXTREMITIES                                         [ ]Normal  [ ]Edema                           NEUROLOGIC  [ ] Normal, non focal  [ ] Focal findings:    PSYCHIATRIC  [ ]Alert and appropriate  [ ] Sedated	 [ ]Agitated    :  Mcbride: [ ] Yes, if yes: Date of Placement:                   [  ] No    LINES: Central Lines [ ] Yes, if yes: Date of Placement                                     [  ] No    HOSPITAL MEDICATIONS:  MEDICATIONS  (STANDING):  albuterol/ipratropium for Nebulization 3 milliLiter(s) Nebulizer every 6 hours  amLODIPine   Tablet 10 milliGRAM(s) Oral daily  artificial  tears Solution 2 Drop(s) Both EYES four times a day  ascorbic acid 500 milliGRAM(s) Oral daily  calcium carbonate    500 mG (Tums) Chewable 1 Tablet(s) Chew every 12 hours  chlorhexidine 0.12% Liquid 15 milliLiter(s) Oral Mucosa every 12 hours  chlorhexidine 4% Liquid 1 Application(s) Topical <User Schedule>  dextrose 50% Injectable 12.5 Gram(s) IV Push once  dextrose 50% Injectable 25 Gram(s) IV Push once  dextrose 50% Injectable 50 milliLiter(s) IV Push once  escitalopram 10 milliGRAM(s) Oral <User Schedule>  fentaNYL   Patch  25 MICROgram(s)/Hr 1 Patch Transdermal every 72 hours  gabapentin Solution 100 milliGRAM(s) Enteral Tube at bedtime  glucagon  Injectable 1 milliGRAM(s) IntraMuscular once  hemorrhoidal Ointment 1 Application(s) Rectal daily  hemorrhoidal Ointment      insulin glargine Injectable (LANTUS) 18 Unit(s) SubCutaneous every morning  insulin lispro (ADMELOG) corrective regimen sliding scale   SubCutaneous every 6 hours  insulin regular  human recombinant 11 Unit(s) SubCutaneous <User Schedule>  insulin regular  human recombinant 26 Unit(s) SubCutaneous <User Schedule>  insulin regular  human recombinant 9 Unit(s) SubCutaneous <User Schedule>  levothyroxine 125 MICROGram(s) Oral <User Schedule>  lidocaine   4% Patch 1 Patch Transdermal daily  lidocaine   4% Patch 1 Patch Transdermal daily  lidocaine 2% Viscous 5 milliLiter(s) Mucosal two times a day  melatonin 1 milliGRAM(s) Oral at bedtime  melatonin 5 milliGRAM(s) Oral at bedtime  multivitamin/minerals/iron Oral Solution (CENTRUM) 15 milliLiter(s) Oral daily  nystatin Powder 1 Application(s) Topical every 8 hours  pantoprazole   Suspension 40 milliGRAM(s) Enteral Tube <User Schedule>  petrolatum Ophthalmic Ointment 1 Application(s) Both EYES four times a day  predniSONE   Tablet 5 milliGRAM(s) Oral daily  QUEtiapine 12.5 milliGRAM(s) Oral <User Schedule>  silver nitrate Applicator 1 Application(s) Topical once  simethicone 80 milliGRAM(s) Chew four times a day  zinc oxide 40% Paste 1 Application(s) Topical daily    MEDICATIONS  (PRN):  acetaminophen   Oral Liquid .. 1000 milliGRAM(s) Enteral Tube every 6 hours PRN Temp greater or equal to 38C (100.4F), Mild Pain (1 - 3)  benzocaine 20% Spray 1 Spray(s) Topical four times a day PRN Cough  diclofenac sodium 1% Gel 2 Gram(s) Topical four times a day PRN pain  diphenhydrAMINE Elixir 25 milliGRAM(s) Oral every 6 hours PRN Rash and/or Itching  guaifenesin/dextromethorphan Oral Liquid 10 milliLiter(s) Oral every 6 hours PRN Cough  oxyCODONE    Solution 2.5 milliGRAM(s) Oral every 6 hours PRN Moderate Pain (4 - 6)  sodium chloride 0.65% Nasal 1 Spray(s) Both Nostrils every 6 hours PRN Nasal Congestion      Mode: AC/ CMV (Assist Control/ Continuous Mandatory Ventilation)  RR (machine): 12  TV (machine): 300  FiO2: 30  PEEP: 5  ITime: 1  MAP: 9  PIP: 28   Patient is a 73y old  Female who presents with a chief complaint of PEG Bleeding (22 Dec 2023 07:35)    HPI:  74 y/o F with PMHx of T2DM, HTN, HLD, anemia, hypothyroidism, RA, fibromyalgia, remote cerebral aneurysm repair, acute hypercapnic respiratory failure now with tracheostomy, PEG tube, and bedbound (trach change 8/18, PEG change 8/14), sacral pressure ulcer, BIBEMS from home for 6 day hx of bleeding from the tracheostomy and PEG tube with associated abdominal pain. Patient still having regular bowel movements, last one occurred prior to ED arrival. Was seen outpatient on 10/26 by critical care Dr. Zaki Gottlieb and apparently had cultures sent at that time. Patient also noted to have chronic sacral decubitous ulcer. Family endorsed one episode of fever, though was not febrile on evaluation. Daughter noted patient was recently experiencing auditory and visual hallucinations (seeing animals that were not there & hearing a "bomb" going off outside her home), though patient not actively hallucinating on evaluation.    ED course notable for CT neck imaging showing no evidence of active bleeding around the tracheostomy site, no hematomas, and no drainable collection around the tracheostomy site. CT abdomen/pelvis notable for gastrostomy tube localized to the stomach with mild thickening noted along the tract; a sacral decubitus ulcer extending to the coccyx with osseous remodeling, possibly representing osteomyelitis; and bibasilar patchy opacities with volume loss in the lungs, representing atelectasis vs infection. Patient admitted for respiratory support, wound care of tracheostomy and PEG, and management of presumed sacral osteomyelitis.   (28 Oct 2023 04:18)    Interval Events:    REVIEW OF SYSTEMS:  [ ] Positive  [ ] All other systems negative  [ ] Unable to assess ROS because ________    Vital Signs Last 24 Hrs  T(C): 36.8 (12-22-23 @ 18:03), Max: 37.3 (12-22-23 @ 09:46)  T(F): 98.3 (12-22-23 @ 18:03), Max: 99.1 (12-22-23 @ 09:46)  HR: 104 (12-23-23 @ 06:18) (93 - 113)  BP: 135/63 (12-22-23 @ 18:03) (114/47 - 146/68)  RR: 22 (12-22-23 @ 18:03) (22 - 25)  SpO2: 100% (12-23-23 @ 06:18) (97% - 100%)    PHYSICAL EXAM:  HEENT:   [ ]Tracheostomy:  [ ]Pupils equal  [ ]No oral lesions  [ ]Abnormal    SKIN  [ ] No Rash  [ ] Abnormal  [ ] pressure    CARDIAC  [ ]Regular  [ ]Abnormal    PULMONARY  [ ]Bilateral Clear Breath Sounds  [ ]Normal Excursion  [ ]Abnormal    GI  [ ]PEG      [ ] +BS		              [ ]Soft, nondistended, nontender	  [ ]Abnormal    MUSCULOSKELETAL                                   [ ]Bedbound                 [ ]Abnormal    [ ]Ambulatory/OOB to chair                           EXTREMITIES                                         [ ]Normal  [ ]Edema                           NEUROLOGIC  [ ] Normal, non focal  [ ] Focal findings:    PSYCHIATRIC  [ ]Alert and appropriate  [ ] Sedated	 [ ]Agitated    :  Mcbride: [ ] Yes, if yes: Date of Placement:                   [  ] No    LINES: Central Lines [ ] Yes, if yes: Date of Placement                                     [  ] No    HOSPITAL MEDICATIONS:  MEDICATIONS  (STANDING):  albuterol/ipratropium for Nebulization 3 milliLiter(s) Nebulizer every 6 hours  amLODIPine   Tablet 10 milliGRAM(s) Oral daily  artificial  tears Solution 2 Drop(s) Both EYES four times a day  ascorbic acid 500 milliGRAM(s) Oral daily  calcium carbonate    500 mG (Tums) Chewable 1 Tablet(s) Chew every 12 hours  chlorhexidine 0.12% Liquid 15 milliLiter(s) Oral Mucosa every 12 hours  chlorhexidine 4% Liquid 1 Application(s) Topical <User Schedule>  dextrose 50% Injectable 12.5 Gram(s) IV Push once  dextrose 50% Injectable 25 Gram(s) IV Push once  dextrose 50% Injectable 50 milliLiter(s) IV Push once  escitalopram 10 milliGRAM(s) Oral <User Schedule>  fentaNYL   Patch  25 MICROgram(s)/Hr 1 Patch Transdermal every 72 hours  gabapentin Solution 100 milliGRAM(s) Enteral Tube at bedtime  glucagon  Injectable 1 milliGRAM(s) IntraMuscular once  hemorrhoidal Ointment 1 Application(s) Rectal daily  hemorrhoidal Ointment      insulin glargine Injectable (LANTUS) 18 Unit(s) SubCutaneous every morning  insulin lispro (ADMELOG) corrective regimen sliding scale   SubCutaneous every 6 hours  insulin regular  human recombinant 11 Unit(s) SubCutaneous <User Schedule>  insulin regular  human recombinant 26 Unit(s) SubCutaneous <User Schedule>  insulin regular  human recombinant 9 Unit(s) SubCutaneous <User Schedule>  levothyroxine 125 MICROGram(s) Oral <User Schedule>  lidocaine   4% Patch 1 Patch Transdermal daily  lidocaine   4% Patch 1 Patch Transdermal daily  lidocaine 2% Viscous 5 milliLiter(s) Mucosal two times a day  melatonin 1 milliGRAM(s) Oral at bedtime  melatonin 5 milliGRAM(s) Oral at bedtime  multivitamin/minerals/iron Oral Solution (CENTRUM) 15 milliLiter(s) Oral daily  nystatin Powder 1 Application(s) Topical every 8 hours  pantoprazole   Suspension 40 milliGRAM(s) Enteral Tube <User Schedule>  petrolatum Ophthalmic Ointment 1 Application(s) Both EYES four times a day  predniSONE   Tablet 5 milliGRAM(s) Oral daily  QUEtiapine 12.5 milliGRAM(s) Oral <User Schedule>  silver nitrate Applicator 1 Application(s) Topical once  simethicone 80 milliGRAM(s) Chew four times a day  zinc oxide 40% Paste 1 Application(s) Topical daily    MEDICATIONS  (PRN):  acetaminophen   Oral Liquid .. 1000 milliGRAM(s) Enteral Tube every 6 hours PRN Temp greater or equal to 38C (100.4F), Mild Pain (1 - 3)  benzocaine 20% Spray 1 Spray(s) Topical four times a day PRN Cough  diclofenac sodium 1% Gel 2 Gram(s) Topical four times a day PRN pain  diphenhydrAMINE Elixir 25 milliGRAM(s) Oral every 6 hours PRN Rash and/or Itching  guaifenesin/dextromethorphan Oral Liquid 10 milliLiter(s) Oral every 6 hours PRN Cough  oxyCODONE    Solution 2.5 milliGRAM(s) Oral every 6 hours PRN Moderate Pain (4 - 6)  sodium chloride 0.65% Nasal 1 Spray(s) Both Nostrils every 6 hours PRN Nasal Congestion      Mode: AC/ CMV (Assist Control/ Continuous Mandatory Ventilation)  RR (machine): 12  TV (machine): 300  FiO2: 30  PEEP: 5  ITime: 1  MAP: 9  PIP: 28   Patient is a 73y old  Female who presents with a chief complaint of PEG Bleeding (22 Dec 2023 07:35)    HPI:  72 y/o F with PMHx of T2DM, HTN, HLD, anemia, hypothyroidism, RA, fibromyalgia, remote cerebral aneurysm repair, acute hypercapnic respiratory failure now with tracheostomy, PEG tube, and bedbound (trach change 8/18, PEG change 8/14), sacral pressure ulcer, BIBEMS from home for 6 day hx of bleeding from the tracheostomy and PEG tube with associated abdominal pain. Patient still having regular bowel movements, last one occurred prior to ED arrival. Was seen outpatient on 10/26 by critical care Dr. Zaki Gottlieb and apparently had cultures sent at that time. Patient also noted to have chronic sacral decubitous ulcer. Family endorsed one episode of fever, though was not febrile on evaluation. Daughter noted patient was recently experiencing auditory and visual hallucinations (seeing animals that were not there & hearing a "bomb" going off outside her home), though patient not actively hallucinating on evaluation.    ED course notable for CT neck imaging showing no evidence of active bleeding around the tracheostomy site, no hematomas, and no drainable collection around the tracheostomy site. CT abdomen/pelvis notable for gastrostomy tube localized to the stomach with mild thickening noted along the tract; a sacral decubitus ulcer extending to the coccyx with osseous remodeling, possibly representing osteomyelitis; and bibasilar patchy opacities with volume loss in the lungs, representing atelectasis vs infection. Patient admitted for respiratory support, wound care of tracheostomy and PEG, and management of presumed sacral osteomyelitis.   (28 Oct 2023 04:18)    Interval Events: No issues overnight    REVIEW OF SYSTEMS:  [ ] Positive  [x ] All other systems negative  [ ] Unable to assess ROS because ________    Vital Signs Last 24 Hrs  T(C): 36.8 (12-22-23 @ 18:03), Max: 37.3 (12-22-23 @ 09:46)  T(F): 98.3 (12-22-23 @ 18:03), Max: 99.1 (12-22-23 @ 09:46)  HR: 104 (12-23-23 @ 06:18) (93 - 113)  BP: 135/63 (12-22-23 @ 18:03) (114/47 - 146/68)  RR: 22 (12-22-23 @ 18:03) (22 - 25)  SpO2: 100% (12-23-23 @ 06:18) (97% - 100%)    PHYSICAL EXAM:  HEENT:   [x]Tracheostomy: 8 XLT  [x]Pupils equal  [ ]No oral lesions  [ ]Abnormal    SKIN  [x]No Rash  [ ] Abnormal  [ ] pressure    CARDIAC  [x]Regular  [ ]Abnormal    PULMONARY  [ ]Bilateral Clear Breath Sounds  [ ]Normal Excursion  [x]Abnormal- Coarse lung sounds bilaterally    GI  [x]PEG      [x] +BS		              [x]Soft, nondistended, nontender	  [ ]Abnormal    MUSCULOSKELETAL                                   [x]Bedbound                 [ ]Abnormal    [ ]Ambulatory/OOB to chair                           EXTREMITIES                                         [x]Normal  [ ]Edema                           NEUROLOGIC  Opens eyes and follows simple commands  [ ] Normal, non focal  [ ] Focal findings:    PSYCHIATRIC  - Unable to assess as pt vent dependent  [ ]Alert and appropriate  [ ] Sedated	 [ ]Agitated    :  Mcbride: [ ] Yes, if yes: Date of Placement:                   [x ] No    LINES: Central Lines [ ] Yes, if yes: Date of Placement                                     [ x] No    HOSPITAL MEDICATIONS:  MEDICATIONS  (STANDING):  albuterol/ipratropium for Nebulization 3 milliLiter(s) Nebulizer every 6 hours  amLODIPine   Tablet 10 milliGRAM(s) Oral daily  artificial  tears Solution 2 Drop(s) Both EYES four times a day  ascorbic acid 500 milliGRAM(s) Oral daily  calcium carbonate    500 mG (Tums) Chewable 1 Tablet(s) Chew every 12 hours  chlorhexidine 0.12% Liquid 15 milliLiter(s) Oral Mucosa every 12 hours  chlorhexidine 4% Liquid 1 Application(s) Topical <User Schedule>  dextrose 50% Injectable 12.5 Gram(s) IV Push once  dextrose 50% Injectable 25 Gram(s) IV Push once  dextrose 50% Injectable 50 milliLiter(s) IV Push once  escitalopram 10 milliGRAM(s) Oral <User Schedule>  fentaNYL   Patch  25 MICROgram(s)/Hr 1 Patch Transdermal every 72 hours  gabapentin Solution 100 milliGRAM(s) Enteral Tube at bedtime  glucagon  Injectable 1 milliGRAM(s) IntraMuscular once  hemorrhoidal Ointment 1 Application(s) Rectal daily  hemorrhoidal Ointment      insulin glargine Injectable (LANTUS) 18 Unit(s) SubCutaneous every morning  insulin lispro (ADMELOG) corrective regimen sliding scale   SubCutaneous every 6 hours  insulin regular  human recombinant 11 Unit(s) SubCutaneous <User Schedule>  insulin regular  human recombinant 26 Unit(s) SubCutaneous <User Schedule>  insulin regular  human recombinant 9 Unit(s) SubCutaneous <User Schedule>  levothyroxine 125 MICROGram(s) Oral <User Schedule>  lidocaine   4% Patch 1 Patch Transdermal daily  lidocaine   4% Patch 1 Patch Transdermal daily  lidocaine 2% Viscous 5 milliLiter(s) Mucosal two times a day  melatonin 1 milliGRAM(s) Oral at bedtime  melatonin 5 milliGRAM(s) Oral at bedtime  multivitamin/minerals/iron Oral Solution (CENTRUM) 15 milliLiter(s) Oral daily  nystatin Powder 1 Application(s) Topical every 8 hours  pantoprazole   Suspension 40 milliGRAM(s) Enteral Tube <User Schedule>  petrolatum Ophthalmic Ointment 1 Application(s) Both EYES four times a day  predniSONE   Tablet 5 milliGRAM(s) Oral daily  QUEtiapine 12.5 milliGRAM(s) Oral <User Schedule>  silver nitrate Applicator 1 Application(s) Topical once  simethicone 80 milliGRAM(s) Chew four times a day  zinc oxide 40% Paste 1 Application(s) Topical daily    MEDICATIONS  (PRN):  acetaminophen   Oral Liquid .. 1000 milliGRAM(s) Enteral Tube every 6 hours PRN Temp greater or equal to 38C (100.4F), Mild Pain (1 - 3)  benzocaine 20% Spray 1 Spray(s) Topical four times a day PRN Cough  diclofenac sodium 1% Gel 2 Gram(s) Topical four times a day PRN pain  diphenhydrAMINE Elixir 25 milliGRAM(s) Oral every 6 hours PRN Rash and/or Itching  guaifenesin/dextromethorphan Oral Liquid 10 milliLiter(s) Oral every 6 hours PRN Cough  oxyCODONE    Solution 2.5 milliGRAM(s) Oral every 6 hours PRN Moderate Pain (4 - 6)  sodium chloride 0.65% Nasal 1 Spray(s) Both Nostrils every 6 hours PRN Nasal Congestion      Mode: AC/ CMV (Assist Control/ Continuous Mandatory Ventilation)  RR (machine): 12  TV (machine): 300  FiO2: 30  PEEP: 5  ITime: 1  MAP: 9  PIP: 28

## 2023-12-23 NOTE — PROGRESS NOTE ADULT - ASSESSMENT
73 year old female with respiratory failure s/p trach and PEG, sacral osteomyelitis.     leakage from chronic PEG site, improving  - s/p trial topical silver nitrate   - if persists, options include lowering the rate of tube feeds and a trial of reglan (pt was previously on it but stopped due to diarrhea)   - cont PPI      I had a prolonged conversation with the patients daughter regarding the hospital course, differential diagnosis, results of diagnostic tests this far, and therapeutic modalities available. Plan of care discussed with the patient after the evaluation. Patient expresses a clear understanding of the plan of care.    Andrew Cleaning D.O.  Gastroenterology and Hepatology  [address], [address]  [place], [place]  Office: [phone]   73 year old female with respiratory failure s/p trach and PEG, sacral osteomyelitis.     leakage from chronic PEG site, improving  - s/p trial topical silver nitrate   - if persists, options include lowering the rate of tube feeds and a trial of reglan (pt was previously on it but stopped due to diarrhea)   - cont PPI      I had a prolonged conversation with the patients daughter regarding the hospital course, differential diagnosis, results of diagnostic tests this far, and therapeutic modalities available. Plan of care discussed with the patient after the evaluation. Patient expresses a clear understanding of the plan of care.    Andrew Cleaning D.O.  Gastroenterology and Hepatology  [address], [address]  [place], [place]  Office: [phone]

## 2023-12-23 NOTE — PROGRESS NOTE ADULT - PROBLEM SELECTOR PLAN 8
- s/p Home Levemir 14 units in morning held on admission as noted w/ hypoglycemia   - Enteral feed changed to Scoopshot diabetic formula; glucose numbers stabilizing  - adjustments made throughout admission per endocrine recs - s/p Home Levemir 14 units in morning held on admission as noted w/ hypoglycemia   - Enteral feed changed to Flixlab diabetic formula; glucose numbers stabilizing  - adjustments made throughout admission per endocrine recs

## 2023-12-23 NOTE — PROGRESS NOTE ADULT - PROBLEM SELECTOR PLAN 10
DVT PPx: Lovenox discontinued given daughter's concern for trach and PEG stoma bleeding/oozing.  Also with mild thrombocytopenia.  Will continue SCDs.     GOC: Full code  __ long discussion of RCU team with daughter, pt remains FULL CODE, daughter wishes MOLST form reevaluation once pt is less delirious DVT PPx: Lovenox discontinued given daughter's concern for trach and PEG stoma bleeding/oozing.  Also with mild thrombocytopenia.  Will continue SCDs.     GOC: Full code  __ long discussion of RCU team with daughter, pt remains FULL CODE, daughter wishes MOLST form reevaluation once pt is less delirious    Patient is clinically stable for discharge

## 2023-12-23 NOTE — PROGRESS NOTE ADULT - SUBJECTIVE AND OBJECTIVE BOX
Chief Complaint:  Patient is a 73y old  Female who presents with a chief complaint of PEG Bleeding (22 Dec 2023 07:35)      Date of service 12-23-23 @ 11:09      Interval Events:   no acute events     Hospital Medications:  acetaminophen   Oral Liquid .. 1000 milliGRAM(s) Enteral Tube every 6 hours PRN  albuterol/ipratropium for Nebulization 3 milliLiter(s) Nebulizer every 6 hours  amLODIPine   Tablet 10 milliGRAM(s) Oral daily  artificial  tears Solution 2 Drop(s) Both EYES four times a day  ascorbic acid 500 milliGRAM(s) Oral daily  benzocaine 20% Spray 1 Spray(s) Topical four times a day PRN  calcium carbonate    500 mG (Tums) Chewable 1 Tablet(s) Chew every 12 hours  chlorhexidine 0.12% Liquid 15 milliLiter(s) Oral Mucosa every 12 hours  chlorhexidine 4% Liquid 1 Application(s) Topical <User Schedule>  dextrose 50% Injectable 25 Gram(s) IV Push once  dextrose 50% Injectable 50 milliLiter(s) IV Push once  dextrose 50% Injectable 12.5 Gram(s) IV Push once  diclofenac sodium 1% Gel 2 Gram(s) Topical four times a day PRN  diphenhydrAMINE Elixir 25 milliGRAM(s) Oral every 6 hours PRN  escitalopram 10 milliGRAM(s) Oral <User Schedule>  fentaNYL   Patch  25 MICROgram(s)/Hr 1 Patch Transdermal every 72 hours  gabapentin Solution 100 milliGRAM(s) Enteral Tube at bedtime  glucagon  Injectable 1 milliGRAM(s) IntraMuscular once  guaifenesin/dextromethorphan Oral Liquid 10 milliLiter(s) Oral every 6 hours PRN  hemorrhoidal Ointment 1 Application(s) Rectal daily  hemorrhoidal Ointment      insulin glargine Injectable (LANTUS) 18 Unit(s) SubCutaneous every morning  insulin lispro (ADMELOG) corrective regimen sliding scale   SubCutaneous every 6 hours  insulin regular  human recombinant 26 Unit(s) SubCutaneous <User Schedule>  insulin regular  human recombinant 11 Unit(s) SubCutaneous <User Schedule>  insulin regular  human recombinant 9 Unit(s) SubCutaneous <User Schedule>  levothyroxine 125 MICROGram(s) Oral <User Schedule>  lidocaine   4% Patch 1 Patch Transdermal daily  lidocaine   4% Patch 1 Patch Transdermal daily  lidocaine 2% Viscous 5 milliLiter(s) Mucosal two times a day  melatonin 1 milliGRAM(s) Oral at bedtime  melatonin 5 milliGRAM(s) Oral at bedtime  multivitamin/minerals/iron Oral Solution (CENTRUM) 15 milliLiter(s) Oral daily  nystatin Powder 1 Application(s) Topical every 8 hours  oxyCODONE    Solution 2.5 milliGRAM(s) Oral every 6 hours PRN  pantoprazole   Suspension 40 milliGRAM(s) Enteral Tube <User Schedule>  petrolatum Ophthalmic Ointment 1 Application(s) Both EYES four times a day  predniSONE   Tablet 5 milliGRAM(s) Oral daily  QUEtiapine 12.5 milliGRAM(s) Oral <User Schedule>  silver nitrate Applicator 1 Application(s) Topical once  simethicone 80 milliGRAM(s) Chew four times a day  sodium chloride 0.65% Nasal 1 Spray(s) Both Nostrils every 6 hours PRN  zinc oxide 40% Paste 1 Application(s) Topical daily        Review of Systems:  unable to obtain     PHYSICAL EXAM:   Vital Signs:  Vital Signs Last 24 Hrs  T(C): 36.8 (22 Dec 2023 18:03), Max: 36.8 (22 Dec 2023 18:03)  T(F): 98.3 (22 Dec 2023 18:03), Max: 98.3 (22 Dec 2023 18:03)  HR: 97 (23 Dec 2023 09:21) (93 - 113)  BP: 135/63 (22 Dec 2023 18:03) (135/63 - 146/68)  BP(mean): --  RR: 22 (22 Dec 2023 18:03) (22 - 25)  SpO2: 95% (23 Dec 2023 09:21) (95% - 100%)    Parameters below as of 23 Dec 2023 06:18  Patient On (Oxygen Delivery Method): ventilator      Daily     Daily       PHYSICAL EXAM:     GENERAL:  Appears stated age, well-groomed, well-nourished, no distress  HEENT:  NC/AT,  conjunctivae anicteric, clear and pink,   NECK: supple, trachea midline  CHEST:  Full & symmetric excursion, no increased effort, breath sounds clear  HEART:  Regular rhythm, no JVD  ABDOMEN:  Soft, non-tender, non-distended, normoactive bowel sounds,  no masses , no hepatosplenomegaly  EXTREMITIES:  no cyanosis,clubbing or edema  SKIN:  No rash, erythema, or, ecchymoses, no jaundice  NEURO:  Alert, non-focal, no asterixis  PSYCH: Appropriate affect, oriented to place and time  RECTAL: Deferred      LABS Personally reviewed by me:                        Imaging personally reviewed by me:

## 2023-12-23 NOTE — PROGRESS NOTE ADULT - ASSESSMENT
74 y/o F with PMHx of T2DM, HTN, HLD, anemia, hypothyroidism, RA, fibromyalgia, remote cerebral aneurysm repair, acute hypercapnic respiratory failure now with tracheostomy, PEG tube, and bedbound (trach change 8/18, PEG change 8/14), sacral pressure ulcer, BIBEMS from home for 6 day hx of bleeding from the tracheostomy and PEG tube with associated abdominal pain, recent history of auditory and visual hallucinations, and with chronic sacral decubitus ulcer. Exam notable for mild abdominal distention with LLQ and RLQ tenderness, tracheostomy in place with no obvious bleeding, PEG tube with scant amount of dried blood, at baseline neurologic status. Imaging showing no active bleeding around tracheostomy site, however was notable for mild thickening along PEG tube tract, sacral ulcer extending to the coccyx suggestive of possible osteomyelitis, and bibasilar patchy lung opacities with volume loss. Patient admitted for respiratory support, wound care of tracheostomy and PEG, and management of sacral osteomyelitis. No further Trach and peg site bleeding noted since admission.  Pelvic MRI imaging also suggestive of Acute OM. Patient placed on long-term antibiotics with Infectious disease guidance.  Additionally treated with a 7d course of Cefepime due to PSA and Serratia tracheitis on 11/8-->11/15 and then resumed cipro/keflex.  Hospital course c/b Delirium for which Seroquel was added and home Lexapro continued. Tracheostomy changed to #9 Cuffed [place] by ENT 11/3.  Despite trach change patient remained with persistent air leak.  On 11/19, the trach was again changed due to leak and loss of volumes on vent.  Trach was changed to #8 distal cuffed [place].  Patient seen by Dermatology for back lesions.  One diagnosed as a seborrheic keratitis and the other a dermatofibroma.  Intermittent abdominal pain throughout hospital course, at times relieved with gas/BM, w/u negative, seen by GI - no recs.  12/10 pt completed cipro and keflex for osteo treatment.  12/13 moderate amounts of secretions w/ blood - tranexamic acid neb given with notable improvement.  12/18 with blood tinged secretion again - TXA 250mg neb x2 doses.  12/19 spiked fever to 102 - pancultured, results still pending.    12/22:  Pt spiked fever to 102 12/20  Pancultured - UA negative - will follow up pending cultures.  Pt received dose of vanco and was started on cefepime.  Holding antibiotics for now, no leukocytosis, no respiratory distress, HDS.  ID recs appreciated.  GI applied silver nitrate to site - will monitor.  Spoke with dental resident again this am, plan for no extractions as confirmed with their attending, updated [place].  Pending RVP and low extremity dopplers. remains off abx. 72 y/o F with PMHx of T2DM, HTN, HLD, anemia, hypothyroidism, RA, fibromyalgia, remote cerebral aneurysm repair, acute hypercapnic respiratory failure now with tracheostomy, PEG tube, and bedbound (trach change 8/18, PEG change 8/14), sacral pressure ulcer, BIBEMS from home for 6 day hx of bleeding from the tracheostomy and PEG tube with associated abdominal pain, recent history of auditory and visual hallucinations, and with chronic sacral decubitus ulcer. Exam notable for mild abdominal distention with LLQ and RLQ tenderness, tracheostomy in place with no obvious bleeding, PEG tube with scant amount of dried blood, at baseline neurologic status. Imaging showing no active bleeding around tracheostomy site, however was notable for mild thickening along PEG tube tract, sacral ulcer extending to the coccyx suggestive of possible osteomyelitis, and bibasilar patchy lung opacities with volume loss. Patient admitted for respiratory support, wound care of tracheostomy and PEG, and management of sacral osteomyelitis. No further Trach and peg site bleeding noted since admission.  Pelvic MRI imaging also suggestive of Acute OM. Patient placed on long-term antibiotics with Infectious disease guidance.  Additionally treated with a 7d course of Cefepime due to PSA and Serratia tracheitis on 11/8-->11/15 and then resumed cipro/keflex.  Hospital course c/b Delirium for which Seroquel was added and home Lexapro continued. Tracheostomy changed to #9 Cuffed [place] by ENT 11/3.  Despite trach change patient remained with persistent air leak.  On 11/19, the trach was again changed due to leak and loss of volumes on vent.  Trach was changed to #8 distal cuffed [place].  Patient seen by Dermatology for back lesions.  One diagnosed as a seborrheic keratitis and the other a dermatofibroma.  Intermittent abdominal pain throughout hospital course, at times relieved with gas/BM, w/u negative, seen by GI - no recs.  12/10 pt completed cipro and keflex for osteo treatment.  12/13 moderate amounts of secretions w/ blood - tranexamic acid neb given with notable improvement.  12/18 with blood tinged secretion again - TXA 250mg neb x2 doses.  12/19 spiked fever to 102 - pancultured, results still pending.    12/22:  Pt spiked fever to 102 12/20  Pancultured - UA negative - will follow up pending cultures.  Pt received dose of vanco and was started on cefepime.  Holding antibiotics for now, no leukocytosis, no respiratory distress, HDS.  ID recs appreciated.  GI applied silver nitrate to site - will monitor.  Spoke with dental resident again this am, plan for no extractions as confirmed with their attending, updated [place].  Pending RVP and low extremity dopplers. remains off abx. 72 y/o F with PMHx of T2DM, HTN, HLD, anemia, hypothyroidism, RA, fibromyalgia, remote cerebral aneurysm repair, acute hypercapnic respiratory failure now with tracheostomy, PEG tube, and bedbound (trach change 8/18, PEG change 8/14), sacral pressure ulcer, BIBEMS from home for 6 day hx of bleeding from the tracheostomy and PEG tube with associated abdominal pain, recent history of auditory and visual hallucinations, and with chronic sacral decubitus ulcer. Exam notable for mild abdominal distention with LLQ and RLQ tenderness, tracheostomy in place with no obvious bleeding, PEG tube with scant amount of dried blood, at baseline neurologic status. Imaging showing no active bleeding around tracheostomy site, however was notable for mild thickening along PEG tube tract, sacral ulcer extending to the coccyx suggestive of possible osteomyelitis, and bibasilar patchy lung opacities with volume loss. Patient admitted for respiratory support, wound care of tracheostomy and PEG, and management of sacral osteomyelitis. No further Trach and peg site bleeding noted since admission.  Pelvic MRI imaging also suggestive of Acute OM. Patient placed on long-term antibiotics with Infectious disease guidance.  Additionally treated with a 7d course of Cefepime due to PSA and Serratia tracheitis on 11/8-->11/15 and then resumed cipro/keflex.  Hospital course c/b Delirium for which Seroquel was added and home Lexapro continued. Tracheostomy changed to #9 Cuffed [place] by ENT 11/3.  Despite trach change patient remained with persistent air leak.  On 11/19, the trach was again changed due to leak and loss of volumes on vent.  Trach was changed to #8 distal cuffed [place].  Patient seen by Dermatology for back lesions.  One diagnosed as a seborrheic keratitis and the other a dermatofibroma.  Intermittent abdominal pain throughout hospital course, at times relieved with gas/BM, w/u negative, seen by GI - no recs.  12/10 pt completed cipro and keflex for osteo treatment.  12/13 moderate amounts of secretions w/ blood - tranexamic acid neb given with notable improvement.  12/18 with blood tinged secretion again - TXA 250mg neb x2 doses.  12/19 spiked fever to 102 - pancultured, results still pending.    12/23:  Pt spiked fever to 102 12/20  Pancultured - UA negative - will follow up pending cultures.  Pt received dose of vanco and was started on cefepime.  Holding antibiotics for now, no leukocytosis, no respiratory distress, HDS.  ID recs appreciated.  GI applied silver nitrate to site - will monitor. Lower extremity dopplers negative for DVT. Informed endocrine of daughters concerns regarding finger stick numbers.  Patient is clinically stable for discharge.

## 2023-12-24 LAB
GLUCOSE BLDC GLUCOMTR-MCNC: 159 MG/DL — HIGH (ref 70–99)
GLUCOSE BLDC GLUCOMTR-MCNC: 159 MG/DL — HIGH (ref 70–99)
GLUCOSE BLDC GLUCOMTR-MCNC: 190 MG/DL — HIGH (ref 70–99)
GLUCOSE BLDC GLUCOMTR-MCNC: 190 MG/DL — HIGH (ref 70–99)
GLUCOSE BLDC GLUCOMTR-MCNC: 195 MG/DL — HIGH (ref 70–99)
GLUCOSE BLDC GLUCOMTR-MCNC: 195 MG/DL — HIGH (ref 70–99)
GLUCOSE BLDC GLUCOMTR-MCNC: 209 MG/DL — HIGH (ref 70–99)
GLUCOSE BLDC GLUCOMTR-MCNC: 209 MG/DL — HIGH (ref 70–99)

## 2023-12-24 PROCEDURE — 99233 SBSQ HOSP IP/OBS HIGH 50: CPT | Mod: FS

## 2023-12-24 RX ADMIN — AMLODIPINE BESYLATE 10 MILLIGRAM(S): 2.5 TABLET ORAL at 06:44

## 2023-12-24 RX ADMIN — GABAPENTIN 100 MILLIGRAM(S): 400 CAPSULE ORAL at 22:03

## 2023-12-24 RX ADMIN — CHLORHEXIDINE GLUCONATE 1 APPLICATION(S): 213 SOLUTION TOPICAL at 06:45

## 2023-12-24 RX ADMIN — Medication 1 SPRAY(S): at 12:14

## 2023-12-24 RX ADMIN — PANTOPRAZOLE SODIUM 40 MILLIGRAM(S): 20 TABLET, DELAYED RELEASE ORAL at 08:19

## 2023-12-24 RX ADMIN — Medication 2: at 18:06

## 2023-12-24 RX ADMIN — SIMETHICONE 80 MILLIGRAM(S): 80 TABLET, CHEWABLE ORAL at 18:05

## 2023-12-24 RX ADMIN — Medication 1: at 12:15

## 2023-12-24 RX ADMIN — QUETIAPINE FUMARATE 12.5 MILLIGRAM(S): 200 TABLET, FILM COATED ORAL at 18:05

## 2023-12-24 RX ADMIN — INSULIN HUMAN 13 UNIT(S): 100 INJECTION, SOLUTION SUBCUTANEOUS at 12:14

## 2023-12-24 RX ADMIN — Medication 1 MILLIGRAM(S): at 22:04

## 2023-12-24 RX ADMIN — Medication 1 APPLICATION(S): at 00:32

## 2023-12-24 RX ADMIN — ESCITALOPRAM OXALATE 10 MILLIGRAM(S): 10 TABLET, FILM COATED ORAL at 08:19

## 2023-12-24 RX ADMIN — NYSTATIN CREAM 1 APPLICATION(S): 100000 CREAM TOPICAL at 22:03

## 2023-12-24 RX ADMIN — Medication 2 DROP(S): at 12:14

## 2023-12-24 RX ADMIN — SIMETHICONE 80 MILLIGRAM(S): 80 TABLET, CHEWABLE ORAL at 06:44

## 2023-12-24 RX ADMIN — Medication 3 MILLILITER(S): at 06:17

## 2023-12-24 RX ADMIN — Medication 1 TABLET(S): at 18:05

## 2023-12-24 RX ADMIN — Medication 1: at 23:46

## 2023-12-24 RX ADMIN — Medication 3 MILLILITER(S): at 11:14

## 2023-12-24 RX ADMIN — Medication 1 TABLET(S): at 06:43

## 2023-12-24 RX ADMIN — CHLORHEXIDINE GLUCONATE 15 MILLILITER(S): 213 SOLUTION TOPICAL at 06:45

## 2023-12-24 RX ADMIN — Medication 3 MILLILITER(S): at 17:28

## 2023-12-24 RX ADMIN — Medication 125 MICROGRAM(S): at 04:26

## 2023-12-24 RX ADMIN — Medication 2: at 00:31

## 2023-12-24 RX ADMIN — Medication 1 APPLICATION(S): at 12:14

## 2023-12-24 RX ADMIN — NYSTATIN CREAM 1 APPLICATION(S): 100000 CREAM TOPICAL at 06:45

## 2023-12-24 RX ADMIN — Medication 1 APPLICATION(S): at 06:44

## 2023-12-24 RX ADMIN — Medication 2 DROP(S): at 18:09

## 2023-12-24 RX ADMIN — INSULIN GLARGINE 18 UNIT(S): 100 INJECTION, SOLUTION SUBCUTANEOUS at 08:19

## 2023-12-24 RX ADMIN — Medication 500 MILLIGRAM(S): at 12:12

## 2023-12-24 RX ADMIN — Medication 2 DROP(S): at 06:44

## 2023-12-24 RX ADMIN — LIDOCAINE 1 PATCH: 4 CREAM TOPICAL at 12:12

## 2023-12-24 RX ADMIN — SIMETHICONE 80 MILLIGRAM(S): 80 TABLET, CHEWABLE ORAL at 00:30

## 2023-12-24 RX ADMIN — LIDOCAINE 1 PATCH: 4 CREAM TOPICAL at 19:00

## 2023-12-24 RX ADMIN — Medication 1: at 06:43

## 2023-12-24 RX ADMIN — SIMETHICONE 80 MILLIGRAM(S): 80 TABLET, CHEWABLE ORAL at 23:47

## 2023-12-24 RX ADMIN — Medication 5 MILLIGRAM(S): at 22:04

## 2023-12-24 RX ADMIN — INSULIN HUMAN 26 UNIT(S): 100 INJECTION, SOLUTION SUBCUTANEOUS at 06:46

## 2023-12-24 RX ADMIN — INSULIN HUMAN 9 UNIT(S): 100 INJECTION, SOLUTION SUBCUTANEOUS at 18:05

## 2023-12-24 RX ADMIN — ZINC OXIDE 1 APPLICATION(S): 200 OINTMENT TOPICAL at 13:51

## 2023-12-24 RX ADMIN — CHLORHEXIDINE GLUCONATE 15 MILLILITER(S): 213 SOLUTION TOPICAL at 18:04

## 2023-12-24 RX ADMIN — Medication 2 DROP(S): at 23:47

## 2023-12-24 RX ADMIN — Medication 1 APPLICATION(S): at 18:09

## 2023-12-24 RX ADMIN — PHENYLEPHRINE-SHARK LIVER OIL-MINERAL OIL-PETROLATUM RECTAL OINTMENT 1 APPLICATION(S): at 12:15

## 2023-12-24 RX ADMIN — Medication 2 DROP(S): at 00:31

## 2023-12-24 RX ADMIN — NYSTATIN CREAM 1 APPLICATION(S): 100000 CREAM TOPICAL at 13:48

## 2023-12-24 RX ADMIN — Medication 15 MILLILITER(S): at 12:12

## 2023-12-24 RX ADMIN — LIDOCAINE 1 PATCH: 4 CREAM TOPICAL at 12:13

## 2023-12-24 RX ADMIN — Medication 5 MILLIGRAM(S): at 06:43

## 2023-12-24 RX ADMIN — SIMETHICONE 80 MILLIGRAM(S): 80 TABLET, CHEWABLE ORAL at 12:15

## 2023-12-24 RX ADMIN — Medication 1 APPLICATION(S): at 23:47

## 2023-12-24 NOTE — PROGRESS NOTE ADULT - SUBJECTIVE AND OBJECTIVE BOX
Patient is a 73y old  Female who presents with a chief complaint of PEG Bleeding (22 Dec 2023 07:35)    HPI:  74 y/o F with PMHx of T2DM, HTN, HLD, anemia, hypothyroidism, RA, fibromyalgia, remote cerebral aneurysm repair, acute hypercapnic respiratory failure now with tracheostomy, PEG tube, and bedbound (trach change 8/18, PEG change 8/14), sacral pressure ulcer, BIBEMS from home for 6 day hx of bleeding from the tracheostomy and PEG tube with associated abdominal pain. Patient still having regular bowel movements, last one occurred prior to ED arrival. Was seen outpatient on 10/26 by critical care Dr. Zaki Gottlieb and apparently had cultures sent at that time. Patient also noted to have chronic sacral decubitous ulcer. Family endorsed one episode of fever, though was not febrile on evaluation. Daughter noted patient was recently experiencing auditory and visual hallucinations (seeing animals that were not there & hearing a "bomb" going off outside her home), though patient not actively hallucinating on evaluation.  ED course notable for CT neck imaging showing no evidence of active bleeding around the tracheostomy site, no hematomas, and no drainable collection around the tracheostomy site. CT abdomen/pelvis notable for gastrostomy tube localized to the stomach with mild thickening noted along the tract; a sacral decubitus ulcer extending to the coccyx with osseous remodeling, possibly representing osteomyelitis; and bibasilar patchy opacities with volume loss in the lungs, representing atelectasis vs infection. Patient admitted for respiratory support, wound care of tracheostomy and PEG, and management of presumed sacral osteomyelitis.   (28 Oct 2023 04:18)    Interval Events:    REVIEW OF SYSTEMS:  [ ] Positive  [ ] All other systems negative  [ ] Unable to assess ROS because ________    Vital Signs Last 24 Hrs  T(C): 36.8 (12-24-23 @ 06:52), Max: 36.8 (12-23-23 @ 22:00)  T(F): 98.2 (12-24-23 @ 06:52), Max: 98.3 (12-23-23 @ 22:00)  HR: 91 (12-24-23 @ 06:52) (83 - 102)  BP: 143/72 (12-24-23 @ 06:52) (105/50 - 143/72)  RR: 18 (12-24-23 @ 06:52) (18 - 18)  SpO2: 99% (12-24-23 @ 06:52) (95% - 100%)    PHYSICAL EXAM:  HEENT:   [ ]Tracheostomy:  [ ]Pupils equal  [ ]No oral lesions  [ ]Abnormal    SKIN  [ ] No Rash  [ ] Abnormal  [ ] pressure    CARDIAC  [ ]Regular  [ ]Abnormal    PULMONARY  [ ]Bilateral Clear Breath Sounds  [ ]Normal Excursion  [ ]Abnormal    GI  [ ]PEG      [ ] +BS		              [ ]Soft, nondistended, nontender	  [ ]Abnormal    MUSCULOSKELETAL                                   [ ]Bedbound                 [ ]Abnormal    [ ]Ambulatory/OOB to chair                           EXTREMITIES                                         [ ]Normal  [ ]Edema                           NEUROLOGIC  [ ] Normal, non focal  [ ] Focal findings:    PSYCHIATRIC  [ ]Alert and appropriate  [ ] Sedated	 [ ]Agitated    :  Mcbride: [ ] Yes, if yes: Date of Placement:                   [  ] No    LINES: Central Lines [ ] Yes, if yes: Date of Placement                                     [  ] No    HOSPITAL MEDICATIONS:  MEDICATIONS  (STANDING):  albuterol/ipratropium for Nebulization 3 milliLiter(s) Nebulizer every 6 hours  amLODIPine   Tablet 10 milliGRAM(s) Oral daily  artificial  tears Solution 2 Drop(s) Both EYES four times a day  ascorbic acid 500 milliGRAM(s) Oral daily  calcium carbonate    500 mG (Tums) Chewable 1 Tablet(s) Chew every 12 hours  chlorhexidine 0.12% Liquid 15 milliLiter(s) Oral Mucosa every 12 hours  chlorhexidine 4% Liquid 1 Application(s) Topical <User Schedule>  dextrose 50% Injectable 12.5 Gram(s) IV Push once  dextrose 50% Injectable 25 Gram(s) IV Push once  escitalopram 10 milliGRAM(s) Oral <User Schedule>  fentaNYL   Patch  25 MICROgram(s)/Hr 1 Patch Transdermal every 72 hours  gabapentin Solution 100 milliGRAM(s) Enteral Tube at bedtime  glucagon  Injectable 1 milliGRAM(s) IntraMuscular once  hemorrhoidal Ointment      hemorrhoidal Ointment 1 Application(s) Rectal daily  insulin glargine Injectable (LANTUS) 18 Unit(s) SubCutaneous every morning  insulin lispro (ADMELOG) corrective regimen sliding scale   SubCutaneous every 6 hours  insulin regular  human recombinant 26 Unit(s) SubCutaneous <User Schedule>  insulin regular  human recombinant 9 Unit(s) SubCutaneous <User Schedule>  insulin regular  human recombinant 13 Unit(s) SubCutaneous <User Schedule>  levothyroxine 125 MICROGram(s) Oral <User Schedule>  lidocaine   4% Patch 1 Patch Transdermal daily  lidocaine   4% Patch 1 Patch Transdermal daily  lidocaine 2% Viscous 5 milliLiter(s) Mucosal two times a day  melatonin 1 milliGRAM(s) Oral at bedtime  melatonin 5 milliGRAM(s) Oral at bedtime  multivitamin/minerals/iron Oral Solution (CENTRUM) 15 milliLiter(s) Oral daily  nystatin Powder 1 Application(s) Topical every 8 hours  pantoprazole   Suspension 40 milliGRAM(s) Enteral Tube <User Schedule>  petrolatum Ophthalmic Ointment 1 Application(s) Both EYES four times a day  predniSONE   Tablet 5 milliGRAM(s) Oral daily  QUEtiapine 12.5 milliGRAM(s) Oral <User Schedule>  silver nitrate Applicator 1 Application(s) Topical once  simethicone 80 milliGRAM(s) Chew four times a day  zinc oxide 40% Paste 1 Application(s) Topical daily    MEDICATIONS  (PRN):  acetaminophen   Oral Liquid .. 1000 milliGRAM(s) Enteral Tube every 6 hours PRN Temp greater or equal to 38C (100.4F), Mild Pain (1 - 3)  benzocaine 20% Spray 1 Spray(s) Topical four times a day PRN Cough  diclofenac sodium 1% Gel 2 Gram(s) Topical four times a day PRN pain  diphenhydrAMINE Elixir 25 milliGRAM(s) Oral every 6 hours PRN Rash and/or Itching  guaifenesin/dextromethorphan Oral Liquid 10 milliLiter(s) Oral every 6 hours PRN Cough  oxyCODONE    Solution 2.5 milliGRAM(s) Oral every 6 hours PRN Moderate Pain (4 - 6)  sodium chloride 0.65% Nasal 1 Spray(s) Both Nostrils every 6 hours PRN Nasal Congestion      Mode: AC/ CMV (Assist Control/ Continuous Mandatory Ventilation)  RR (machine): 12  TV (machine): 300  FiO2: 30  PEEP: 5  ITime: 1  MAP: 11  PIP: 28 Patient is a 73y old  Female who presents with a chief complaint of PEG Bleeding (22 Dec 2023 07:35)    HPI:  74 y/o F with PMHx of T2DM, HTN, HLD, anemia, hypothyroidism, RA, fibromyalgia, remote cerebral aneurysm repair, acute hypercapnic respiratory failure now with tracheostomy, PEG tube, and bedbound (trach change 8/18, PEG change 8/14), sacral pressure ulcer, BIBEMS from home for 6 day hx of bleeding from the tracheostomy and PEG tube with associated abdominal pain. Patient still having regular bowel movements, last one occurred prior to ED arrival. Was seen outpatient on 10/26 by critical care Dr. Zaki Gottlieb and apparently had cultures sent at that time. Patient also noted to have chronic sacral decubitous ulcer. Family endorsed one episode of fever, though was not febrile on evaluation. Daughter noted patient was recently experiencing auditory and visual hallucinations (seeing animals that were not there & hearing a "bomb" going off outside her home), though patient not actively hallucinating on evaluation.  ED course notable for CT neck imaging showing no evidence of active bleeding around the tracheostomy site, no hematomas, and no drainable collection around the tracheostomy site. CT abdomen/pelvis notable for gastrostomy tube localized to the stomach with mild thickening noted along the tract; a sacral decubitus ulcer extending to the coccyx with osseous remodeling, possibly representing osteomyelitis; and bibasilar patchy opacities with volume loss in the lungs, representing atelectasis vs infection. Patient admitted for respiratory support, wound care of tracheostomy and PEG, and management of presumed sacral osteomyelitis.   (28 Oct 2023 04:18)    Interval Events: No issues overnight    REVIEW OF SYSTEMS:  [ ] Positive  [x ] All other systems negative  [ ] Unable to assess ROS because ________    Vital Signs Last 24 Hrs  T(C): 36.8 (12-24-23 @ 06:52), Max: 36.8 (12-23-23 @ 22:00)  T(F): 98.2 (12-24-23 @ 06:52), Max: 98.3 (12-23-23 @ 22:00)  HR: 91 (12-24-23 @ 06:52) (83 - 102)  BP: 143/72 (12-24-23 @ 06:52) (105/50 - 143/72)  RR: 18 (12-24-23 @ 06:52) (18 - 18)  SpO2: 99% (12-24-23 @ 06:52) (95% - 100%)    PHYSICAL EXAM:  HEENT:   [x]Tracheostomy: 8 XLT  [x]Pupils equal  [ ]No oral lesions  [ ]Abnormal    SKIN  [x]No Rash  [ ] Abnormal  [ ] pressure    CARDIAC  [x]Regular  [ ]Abnormal    PULMONARY  [ ]Bilateral Clear Breath Sounds  [ ]Normal Excursion  [x]Abnormal- Coarse lung sounds bilaterally    GI  [x]PEG      [x] +BS		              [x]Soft, nondistended, nontender	  [ ]Abnormal    MUSCULOSKELETAL                                   [x]Bedbound                 [ ]Abnormal    [ ]Ambulatory/OOB to chair                           EXTREMITIES                                         [x]Normal  [ ]Edema                           NEUROLOGIC  Opens eyes and follows simple commands  [ ] Normal, non focal  [ ] Focal findings:    PSYCHIATRIC  - Unable to assess as pt vent dependent  [ ]Alert and appropriate  [ ] Sedated	 [ ]Agitated    :  Mcbride: [ ] Yes, if yes: Date of Placement:                   [x ] No    LINES: Central Lines [ ] Yes, if yes: Date of Placement                                     [ x] No    HOSPITAL MEDICATIONS:  MEDICATIONS  (STANDING):  albuterol/ipratropium for Nebulization 3 milliLiter(s) Nebulizer every 6 hours  amLODIPine   Tablet 10 milliGRAM(s) Oral daily  artificial  tears Solution 2 Drop(s) Both EYES four times a day  ascorbic acid 500 milliGRAM(s) Oral daily  calcium carbonate    500 mG (Tums) Chewable 1 Tablet(s) Chew every 12 hours  chlorhexidine 0.12% Liquid 15 milliLiter(s) Oral Mucosa every 12 hours  chlorhexidine 4% Liquid 1 Application(s) Topical <User Schedule>  dextrose 50% Injectable 12.5 Gram(s) IV Push once  dextrose 50% Injectable 25 Gram(s) IV Push once  escitalopram 10 milliGRAM(s) Oral <User Schedule>  fentaNYL   Patch  25 MICROgram(s)/Hr 1 Patch Transdermal every 72 hours  gabapentin Solution 100 milliGRAM(s) Enteral Tube at bedtime  glucagon  Injectable 1 milliGRAM(s) IntraMuscular once  hemorrhoidal Ointment      hemorrhoidal Ointment 1 Application(s) Rectal daily  insulin glargine Injectable (LANTUS) 18 Unit(s) SubCutaneous every morning  insulin lispro (ADMELOG) corrective regimen sliding scale   SubCutaneous every 6 hours  insulin regular  human recombinant 26 Unit(s) SubCutaneous <User Schedule>  insulin regular  human recombinant 9 Unit(s) SubCutaneous <User Schedule>  insulin regular  human recombinant 13 Unit(s) SubCutaneous <User Schedule>  levothyroxine 125 MICROGram(s) Oral <User Schedule>  lidocaine   4% Patch 1 Patch Transdermal daily  lidocaine   4% Patch 1 Patch Transdermal daily  lidocaine 2% Viscous 5 milliLiter(s) Mucosal two times a day  melatonin 1 milliGRAM(s) Oral at bedtime  melatonin 5 milliGRAM(s) Oral at bedtime  multivitamin/minerals/iron Oral Solution (CENTRUM) 15 milliLiter(s) Oral daily  nystatin Powder 1 Application(s) Topical every 8 hours  pantoprazole   Suspension 40 milliGRAM(s) Enteral Tube <User Schedule>  petrolatum Ophthalmic Ointment 1 Application(s) Both EYES four times a day  predniSONE   Tablet 5 milliGRAM(s) Oral daily  QUEtiapine 12.5 milliGRAM(s) Oral <User Schedule>  silver nitrate Applicator 1 Application(s) Topical once  simethicone 80 milliGRAM(s) Chew four times a day  zinc oxide 40% Paste 1 Application(s) Topical daily    MEDICATIONS  (PRN):  acetaminophen   Oral Liquid .. 1000 milliGRAM(s) Enteral Tube every 6 hours PRN Temp greater or equal to 38C (100.4F), Mild Pain (1 - 3)  benzocaine 20% Spray 1 Spray(s) Topical four times a day PRN Cough  diclofenac sodium 1% Gel 2 Gram(s) Topical four times a day PRN pain  diphenhydrAMINE Elixir 25 milliGRAM(s) Oral every 6 hours PRN Rash and/or Itching  guaifenesin/dextromethorphan Oral Liquid 10 milliLiter(s) Oral every 6 hours PRN Cough  oxyCODONE    Solution 2.5 milliGRAM(s) Oral every 6 hours PRN Moderate Pain (4 - 6)  sodium chloride 0.65% Nasal 1 Spray(s) Both Nostrils every 6 hours PRN Nasal Congestion      Mode: AC/ CMV (Assist Control/ Continuous Mandatory Ventilation)  RR (machine): 12  TV (machine): 300  FiO2: 30  PEEP: 5  ITime: 1  MAP: 11  PIP: 28

## 2023-12-24 NOTE — PROGRESS NOTE ADULT - ASSESSMENT
73 year old female with respiratory failure s/p trach and PEG, sacral osteomyelitis.     hx of leaking from chronic PEG site  no leakage today   - if persists, options include lowering the rate of tube feeds +/- a trial of reglan (pt was previously on it but it was stopped due to diarrhea)   - cont PPI      I had a prolonged conversation with the patients daughter regarding the hospital course, differential diagnosis, results of diagnostic tests this far, and therapeutic modalities available. Plan of care discussed with the patient after the evaluation. Patient expresses a clear understanding of the plan of care.    Andrew Cleaning D.O.  Gastroenterology and Hepatology  [address] [facility], [address]  [place], [place]  Office: [phone]   73 year old female with respiratory failure s/p trach and PEG, sacral osteomyelitis.     hx of leaking from chronic PEG site  no leakage today   - if persists, options include lowering the rate of tube feeds +/- a trial of reglan (pt was previously on it but it was stopped due to diarrhea)   - cont PPI      I had a prolonged conversation with the patients daughter regarding the hospital course, differential diagnosis, results of diagnostic tests this far, and therapeutic modalities available. Plan of care discussed with the patient after the evaluation. Patient expresses a clear understanding of the plan of care.    Andrew Cleaning D.O.  Gastroenterology and Hepatology  [address] [facility], [address]  [place], [place]  Office: [phone]

## 2023-12-24 NOTE — PROGRESS NOTE ADULT - SUBJECTIVE AND OBJECTIVE BOX
Chief Complaint:  Patient is a 73y old  Female who presents with a chief complaint of PEG Bleeding (22 Dec 2023 07:35)      Date of service 12-24-23 @ 12:17      Interval Events:   no leaking from PEG site    Hospital Medications:  acetaminophen   Oral Liquid .. 1000 milliGRAM(s) Enteral Tube every 6 hours PRN  albuterol/ipratropium for Nebulization 3 milliLiter(s) Nebulizer every 6 hours  amLODIPine   Tablet 10 milliGRAM(s) Oral daily  artificial  tears Solution 2 Drop(s) Both EYES four times a day  ascorbic acid 500 milliGRAM(s) Oral daily  benzocaine 20% Spray 1 Spray(s) Topical four times a day PRN  calcium carbonate    500 mG (Tums) Chewable 1 Tablet(s) Chew every 12 hours  chlorhexidine 0.12% Liquid 15 milliLiter(s) Oral Mucosa every 12 hours  chlorhexidine 4% Liquid 1 Application(s) Topical <User Schedule>  dextrose 50% Injectable 12.5 Gram(s) IV Push once  dextrose 50% Injectable 25 Gram(s) IV Push once  diclofenac sodium 1% Gel 2 Gram(s) Topical four times a day PRN  diphenhydrAMINE Elixir 25 milliGRAM(s) Oral every 6 hours PRN  escitalopram 10 milliGRAM(s) Oral <User Schedule>  fentaNYL   Patch  25 MICROgram(s)/Hr 1 Patch Transdermal every 72 hours  gabapentin Solution 100 milliGRAM(s) Enteral Tube at bedtime  glucagon  Injectable 1 milliGRAM(s) IntraMuscular once  guaifenesin/dextromethorphan Oral Liquid 10 milliLiter(s) Oral every 6 hours PRN  hemorrhoidal Ointment      hemorrhoidal Ointment 1 Application(s) Rectal daily  insulin glargine Injectable (LANTUS) 18 Unit(s) SubCutaneous every morning  insulin lispro (ADMELOG) corrective regimen sliding scale   SubCutaneous every 6 hours  insulin regular  human recombinant 9 Unit(s) SubCutaneous <User Schedule>  insulin regular  human recombinant 13 Unit(s) SubCutaneous <User Schedule>  insulin regular  human recombinant 26 Unit(s) SubCutaneous <User Schedule>  levothyroxine 125 MICROGram(s) Oral <User Schedule>  lidocaine   4% Patch 1 Patch Transdermal daily  lidocaine   4% Patch 1 Patch Transdermal daily  lidocaine 2% Viscous 5 milliLiter(s) Mucosal two times a day  melatonin 5 milliGRAM(s) Oral at bedtime  melatonin 1 milliGRAM(s) Oral at bedtime  multivitamin/minerals/iron Oral Solution (CENTRUM) 15 milliLiter(s) Oral daily  nystatin Powder 1 Application(s) Topical every 8 hours  oxyCODONE    Solution 2.5 milliGRAM(s) Oral every 6 hours PRN  pantoprazole   Suspension 40 milliGRAM(s) Enteral Tube <User Schedule>  petrolatum Ophthalmic Ointment 1 Application(s) Both EYES four times a day  predniSONE   Tablet 5 milliGRAM(s) Oral daily  QUEtiapine 12.5 milliGRAM(s) Oral <User Schedule>  silver nitrate Applicator 1 Application(s) Topical once  simethicone 80 milliGRAM(s) Chew four times a day  sodium chloride 0.65% Nasal 1 Spray(s) Both Nostrils every 6 hours PRN  zinc oxide 40% Paste 1 Application(s) Topical daily        Review of Systems:  uable to obtain     PHYSICAL EXAM:   Vital Signs:  Vital Signs Last 24 Hrs  T(C): 36.8 (24 Dec 2023 06:52), Max: 36.8 (23 Dec 2023 22:00)  T(F): 98.2 (24 Dec 2023 06:52), Max: 98.3 (23 Dec 2023 22:00)  HR: 89 (24 Dec 2023 09:15) (83 - 102)  BP: 143/72 (24 Dec 2023 06:52) (105/50 - 143/72)  BP(mean): --  RR: 20 (24 Dec 2023 09:15) (18 - 20)  SpO2: 100% (24 Dec 2023 09:15) (96% - 100%)    Parameters below as of 24 Dec 2023 09:15  Patient On (Oxygen Delivery Method): ventilator      Daily     Daily       PHYSICAL EXAM:     GENERAL:  Appears stated age, well-groomed, well-nourished, no distress  HEENT:  NC/AT,  conjunctivae anicteric, clear and pink,   NECK: supple, trachea midline  CHEST:  Full & symmetric excursion, no increased effort, breath sounds clear  HEART:  Regular rhythm, no JVD  ABDOMEN:  Soft, non-tender, non-distended, normoactive bowel sounds,  no masses , no hepatosplenomegaly  EXTREMITIES:  no cyanosis,clubbing or edema  SKIN:  No rash, erythema, or, ecchymoses, no jaundice  NEURO:  Alert, non-focal, no asterixis  PSYCH: Appropriate affect, oriented to place and time  RECTAL: Deferred      LABS Personally reviewed by me:                        Imaging personally reviewed by me:

## 2023-12-24 NOTE — PROGRESS NOTE ADULT - ASSESSMENT
72 y/o F with PMHx of T2DM, HTN, HLD, anemia, hypothyroidism, RA, fibromyalgia, remote cerebral aneurysm repair, acute hypercapnic respiratory failure now with tracheostomy, PEG tube, and bedbound (trach change 8/18, PEG change 8/14), sacral pressure ulcer, BIBEMS from home for 6 day hx of bleeding from the tracheostomy and PEG tube with associated abdominal pain, recent history of auditory and visual hallucinations, and with chronic sacral decubitus ulcer. Exam notable for mild abdominal distention with LLQ and RLQ tenderness, tracheostomy in place with no obvious bleeding, PEG tube with scant amount of dried blood, at baseline neurologic status. Imaging showing no active bleeding around tracheostomy site, however was notable for mild thickening along PEG tube tract, sacral ulcer extending to the coccyx suggestive of possible osteomyelitis, and bibasilar patchy lung opacities with volume loss. Patient admitted for respiratory support, wound care of tracheostomy and PEG, and management of sacral osteomyelitis. No further Trach and peg site bleeding noted since admission.  Pelvic MRI imaging also suggestive of Acute OM. Patient placed on long-term antibiotics with Infectious disease guidance.  Additionally treated with a 7d course of Cefepime due to PSA and Serratia tracheitis on 11/8-->11/15 and then resumed cipro/keflex.  Hospital course c/b Delirium for which Seroquel was added and home Lexapro continued. Tracheostomy changed to #9 Cuffed [place] by ENT 11/3.  Despite trach change patient remained with persistent air leak.  On 11/19, the trach was again changed due to leak and loss of volumes on vent.  Trach was changed to #8 distal cuffed [place].  Patient seen by Dermatology for back lesions.  One diagnosed as a seborrheic keratitis and the other a dermatofibroma.  Intermittent abdominal pain throughout hospital course, at times relieved with gas/BM, w/u negative, seen by GI - no recs.  12/10 pt completed cipro and keflex for osteo treatment.  12/13 moderate amounts of secretions w/ blood - tranexamic acid neb given with notable improvement.  12/18 with blood tinged secretion again - TXA 250mg neb x2 doses.  12/19 spiked fever to 102 - pancultured, results still pending.    12/23:  Pt spiked fever to 102 12/20  Pancultured - UA negative - will follow up pending cultures.  Pt received dose of vanco and was started on cefepime.  Holding antibiotics for now, no leukocytosis, no respiratory distress, HDS.  ID recs appreciated.  GI applied silver nitrate to site - will monitor. Lower extremity dopplers negative for DVT. Informed endocrine of daughters concerns regarding finger stick numbers.  Patient is clinically stable for discharge. 74 y/o F with PMHx of T2DM, HTN, HLD, anemia, hypothyroidism, RA, fibromyalgia, remote cerebral aneurysm repair, acute hypercapnic respiratory failure now with tracheostomy, PEG tube, and bedbound (trach change 8/18, PEG change 8/14), sacral pressure ulcer, BIBEMS from home for 6 day hx of bleeding from the tracheostomy and PEG tube with associated abdominal pain, recent history of auditory and visual hallucinations, and with chronic sacral decubitus ulcer. Exam notable for mild abdominal distention with LLQ and RLQ tenderness, tracheostomy in place with no obvious bleeding, PEG tube with scant amount of dried blood, at baseline neurologic status. Imaging showing no active bleeding around tracheostomy site, however was notable for mild thickening along PEG tube tract, sacral ulcer extending to the coccyx suggestive of possible osteomyelitis, and bibasilar patchy lung opacities with volume loss. Patient admitted for respiratory support, wound care of tracheostomy and PEG, and management of sacral osteomyelitis. No further Trach and peg site bleeding noted since admission.  Pelvic MRI imaging also suggestive of Acute OM. Patient placed on long-term antibiotics with Infectious disease guidance.  Additionally treated with a 7d course of Cefepime due to PSA and Serratia tracheitis on 11/8-->11/15 and then resumed cipro/keflex.  Hospital course c/b Delirium for which Seroquel was added and home Lexapro continued. Tracheostomy changed to #9 Cuffed [place] by ENT 11/3.  Despite trach change patient remained with persistent air leak.  On 11/19, the trach was again changed due to leak and loss of volumes on vent.  Trach was changed to #8 distal cuffed [place].  Patient seen by Dermatology for back lesions.  One diagnosed as a seborrheic keratitis and the other a dermatofibroma.  Intermittent abdominal pain throughout hospital course, at times relieved with gas/BM, w/u negative, seen by GI - no recs.  12/10 pt completed cipro and keflex for osteo treatment.  12/13 moderate amounts of secretions w/ blood - tranexamic acid neb given with notable improvement.  12/18 with blood tinged secretion again - TXA 250mg neb x2 doses.  12/19 spiked fever to 102 - pancultured, results still pending.    12/23:  Pt spiked fever to 102 12/20  Pancultured - UA negative - will follow up pending cultures.  Pt received dose of vanco and was started on cefepime.  Holding antibiotics for now, no leukocytosis, no respiratory distress, HDS.  ID recs appreciated.  GI applied silver nitrate to site - will monitor. Lower extremity dopplers negative for DVT. Informed endocrine of daughters concerns regarding finger stick numbers.  Patient is clinically stable for discharge. 72 y/o F with PMHx of T2DM, HTN, HLD, anemia, hypothyroidism, RA, fibromyalgia, remote cerebral aneurysm repair, acute hypercapnic respiratory failure now with tracheostomy, PEG tube, and bedbound (trach change 8/18, PEG change 8/14), sacral pressure ulcer, BIBEMS from home for 6 day hx of bleeding from the tracheostomy and PEG tube with associated abdominal pain, recent history of auditory and visual hallucinations, and with chronic sacral decubitus ulcer. Exam notable for mild abdominal distention with LLQ and RLQ tenderness, tracheostomy in place with no obvious bleeding, PEG tube with scant amount of dried blood, at baseline neurologic status. Imaging showing no active bleeding around tracheostomy site, however was notable for mild thickening along PEG tube tract, sacral ulcer extending to the coccyx suggestive of possible osteomyelitis, and bibasilar patchy lung opacities with volume loss. Patient admitted for respiratory support, wound care of tracheostomy and PEG, and management of sacral osteomyelitis. No further Trach and peg site bleeding noted since admission.  Pelvic MRI imaging also suggestive of Acute OM. Patient placed on long-term antibiotics with Infectious disease guidance.  Additionally treated with a 7d course of Cefepime due to PSA and Serratia tracheitis on 11/8-->11/15 and then resumed cipro/keflex.  Hospital course c/b Delirium for which Seroquel was added and home Lexapro continued. Tracheostomy changed to #9 Cuffed [place] by ENT 11/3.  Despite trach change patient remained with persistent air leak.  On 11/19, the trach was again changed due to leak and loss of volumes on vent.  Trach was changed to #8 distal cuffed [place].  Patient seen by Dermatology for back lesions.  One diagnosed as a seborrheic keratitis and the other a dermatofibroma.  Intermittent abdominal pain throughout hospital course, at times relieved with gas/BM, w/u negative, seen by GI - no recs.  12/10 pt completed cipro and keflex for osteo treatment.  12/13 moderate amounts of secretions w/ blood - tranexamic acid neb given with notable improvement.  12/18 with blood tinged secretion again - TXA 250mg neb x2 doses.  12/19 spiked fever to 102 - pancultured, results still pending.    12/24; Occasional small leakage around PEG site. Blood cultures NGTD x 4 days. Patient is clinically stable for discharge. 74 y/o F with PMHx of T2DM, HTN, HLD, anemia, hypothyroidism, RA, fibromyalgia, remote cerebral aneurysm repair, acute hypercapnic respiratory failure now with tracheostomy, PEG tube, and bedbound (trach change 8/18, PEG change 8/14), sacral pressure ulcer, BIBEMS from home for 6 day hx of bleeding from the tracheostomy and PEG tube with associated abdominal pain, recent history of auditory and visual hallucinations, and with chronic sacral decubitus ulcer. Exam notable for mild abdominal distention with LLQ and RLQ tenderness, tracheostomy in place with no obvious bleeding, PEG tube with scant amount of dried blood, at baseline neurologic status. Imaging showing no active bleeding around tracheostomy site, however was notable for mild thickening along PEG tube tract, sacral ulcer extending to the coccyx suggestive of possible osteomyelitis, and bibasilar patchy lung opacities with volume loss. Patient admitted for respiratory support, wound care of tracheostomy and PEG, and management of sacral osteomyelitis. No further Trach and peg site bleeding noted since admission.  Pelvic MRI imaging also suggestive of Acute OM. Patient placed on long-term antibiotics with Infectious disease guidance.  Additionally treated with a 7d course of Cefepime due to PSA and Serratia tracheitis on 11/8-->11/15 and then resumed cipro/keflex.  Hospital course c/b Delirium for which Seroquel was added and home Lexapro continued. Tracheostomy changed to #9 Cuffed [place] by ENT 11/3.  Despite trach change patient remained with persistent air leak.  On 11/19, the trach was again changed due to leak and loss of volumes on vent.  Trach was changed to #8 distal cuffed [place].  Patient seen by Dermatology for back lesions.  One diagnosed as a seborrheic keratitis and the other a dermatofibroma.  Intermittent abdominal pain throughout hospital course, at times relieved with gas/BM, w/u negative, seen by GI - no recs.  12/10 pt completed cipro and keflex for osteo treatment.  12/13 moderate amounts of secretions w/ blood - tranexamic acid neb given with notable improvement.  12/18 with blood tinged secretion again - TXA 250mg neb x2 doses.  12/19 spiked fever to 102 - pancultured, results still pending.    12/24; Occasional small leakage around PEG site. Blood cultures NGTD x 4 days. Patient is clinically stable for discharge.

## 2023-12-24 NOTE — PROGRESS NOTE ADULT - PROBLEM SELECTOR PLAN 8
- s/p Home Levemir 14 units in morning held on admission as noted w/ hypoglycemia   - Enteral feed changed to iLive diabetic formula; glucose numbers stabilizing  - adjustments made throughout admission per endocrine recs - s/p Home Levemir 14 units in morning held on admission as noted w/ hypoglycemia   - Enteral feed changed to etouches diabetic formula; glucose numbers stabilizing  - adjustments made throughout admission per endocrine recs

## 2023-12-24 NOTE — PROGRESS NOTE ADULT - PROBLEM SELECTOR PLAN 10
DVT PPx: Lovenox discontinued given daughter's concern for trach and PEG stoma bleeding/oozing.  Also with mild thrombocytopenia.  Will continue SCDs.     GOC: Full code  __ long discussion of RCU team with daughter, pt remains FULL CODE, daughter wishes MOLST form reevaluation once pt is less delirious    Patient is clinically stable for discharge

## 2023-12-24 NOTE — PROGRESS NOTE ADULT - PROBLEM SELECTOR PLAN 1
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 aureus  - Sputum Cx 11/6: + PSA and Serratia, Cefepime 11/8 -->11/15  - 11/26 sputum culture MDR pseudomonas - clinically stable, defer treatment unless decompensates as per ID  - 11/26 urine culture - enterococcus - no need to treat - cfu low, organism not significant as per ID  - 12/19 respiked fever - cultures pending - received dose of vanco and cefepime - d/c'd, holding antibiotics until further results Found w/ Bilateral PNA vs Atelectasis, and Possible OM Sacrum   - S/p Chest CT (10/28):  c/w Bilateral PNA vs Atelectasis   - s/p Vanco, Aztreonam   - Blood cx: 11/12 -- neg   - Sputum cx + Pseudomonas aeruginosa, S. aureus  - Sputum Cx 11/6: + PSA and Serratia, Cefepime 11/8 -->11/15  - 11/26 sputum culture MDR pseudomonas - clinically stable, defer treatment unless decompensates as per ID  - 11/26 urine culture - enterococcus - no need to treat - cfu low, organism not significant as per ID  - 12/19 respiked fever - blood cultures NGTD - received dose of vanco and cefepime - d/c'd, holding antibiotics until further results

## 2023-12-25 DIAGNOSIS — Z71.89 OTHER SPECIFIED COUNSELING: ICD-10-CM

## 2023-12-25 LAB
ANION GAP SERPL CALC-SCNC: 9 MMOL/L — SIGNIFICANT CHANGE UP (ref 5–17)
ANION GAP SERPL CALC-SCNC: 9 MMOL/L — SIGNIFICANT CHANGE UP (ref 5–17)
BASOPHILS # BLD AUTO: 0 K/UL — SIGNIFICANT CHANGE UP (ref 0–0.2)
BASOPHILS # BLD AUTO: 0 K/UL — SIGNIFICANT CHANGE UP (ref 0–0.2)
BASOPHILS NFR BLD AUTO: 0 % — SIGNIFICANT CHANGE UP (ref 0–2)
BASOPHILS NFR BLD AUTO: 0 % — SIGNIFICANT CHANGE UP (ref 0–2)
BUN SERPL-MCNC: 31 MG/DL — HIGH (ref 7–23)
BUN SERPL-MCNC: 31 MG/DL — HIGH (ref 7–23)
CALCIUM SERPL-MCNC: 10.7 MG/DL — HIGH (ref 8.4–10.5)
CALCIUM SERPL-MCNC: 10.7 MG/DL — HIGH (ref 8.4–10.5)
CHLORIDE SERPL-SCNC: 103 MMOL/L — SIGNIFICANT CHANGE UP (ref 96–108)
CHLORIDE SERPL-SCNC: 103 MMOL/L — SIGNIFICANT CHANGE UP (ref 96–108)
CO2 SERPL-SCNC: 32 MMOL/L — HIGH (ref 22–31)
CO2 SERPL-SCNC: 32 MMOL/L — HIGH (ref 22–31)
CREAT SERPL-MCNC: <0.3 MG/DL — LOW (ref 0.5–1.3)
CREAT SERPL-MCNC: <0.3 MG/DL — LOW (ref 0.5–1.3)
CULTURE RESULTS: SIGNIFICANT CHANGE UP
EGFR: 113 ML/MIN/1.73M2 — SIGNIFICANT CHANGE UP
EGFR: 113 ML/MIN/1.73M2 — SIGNIFICANT CHANGE UP
EOSINOPHIL # BLD AUTO: 0.06 K/UL — SIGNIFICANT CHANGE UP (ref 0–0.5)
EOSINOPHIL # BLD AUTO: 0.06 K/UL — SIGNIFICANT CHANGE UP (ref 0–0.5)
EOSINOPHIL NFR BLD AUTO: 0.9 % — SIGNIFICANT CHANGE UP (ref 0–6)
EOSINOPHIL NFR BLD AUTO: 0.9 % — SIGNIFICANT CHANGE UP (ref 0–6)
GLUCOSE BLDC GLUCOMTR-MCNC: 137 MG/DL — HIGH (ref 70–99)
GLUCOSE BLDC GLUCOMTR-MCNC: 137 MG/DL — HIGH (ref 70–99)
GLUCOSE BLDC GLUCOMTR-MCNC: 170 MG/DL — HIGH (ref 70–99)
GLUCOSE BLDC GLUCOMTR-MCNC: 170 MG/DL — HIGH (ref 70–99)
GLUCOSE BLDC GLUCOMTR-MCNC: 174 MG/DL — HIGH (ref 70–99)
GLUCOSE BLDC GLUCOMTR-MCNC: 174 MG/DL — HIGH (ref 70–99)
GLUCOSE BLDC GLUCOMTR-MCNC: 209 MG/DL — HIGH (ref 70–99)
GLUCOSE BLDC GLUCOMTR-MCNC: 209 MG/DL — HIGH (ref 70–99)
GLUCOSE BLDC GLUCOMTR-MCNC: 212 MG/DL — HIGH (ref 70–99)
GLUCOSE BLDC GLUCOMTR-MCNC: 212 MG/DL — HIGH (ref 70–99)
GLUCOSE SERPL-MCNC: 166 MG/DL — HIGH (ref 70–99)
GLUCOSE SERPL-MCNC: 166 MG/DL — HIGH (ref 70–99)
HCT VFR BLD CALC: 29.8 % — LOW (ref 34.5–45)
HCT VFR BLD CALC: 29.8 % — LOW (ref 34.5–45)
HGB BLD-MCNC: 8.6 G/DL — LOW (ref 11.5–15.5)
HGB BLD-MCNC: 8.6 G/DL — LOW (ref 11.5–15.5)
LYMPHOCYTES # BLD AUTO: 1.35 K/UL — SIGNIFICANT CHANGE UP (ref 1–3.3)
LYMPHOCYTES # BLD AUTO: 1.35 K/UL — SIGNIFICANT CHANGE UP (ref 1–3.3)
LYMPHOCYTES # BLD AUTO: 20 % — SIGNIFICANT CHANGE UP (ref 13–44)
LYMPHOCYTES # BLD AUTO: 20 % — SIGNIFICANT CHANGE UP (ref 13–44)
MAGNESIUM SERPL-MCNC: 1.9 MG/DL — SIGNIFICANT CHANGE UP (ref 1.6–2.6)
MAGNESIUM SERPL-MCNC: 1.9 MG/DL — SIGNIFICANT CHANGE UP (ref 1.6–2.6)
MANUAL SMEAR VERIFICATION: SIGNIFICANT CHANGE UP
MANUAL SMEAR VERIFICATION: SIGNIFICANT CHANGE UP
MCHC RBC-ENTMCNC: 27.1 PG — SIGNIFICANT CHANGE UP (ref 27–34)
MCHC RBC-ENTMCNC: 27.1 PG — SIGNIFICANT CHANGE UP (ref 27–34)
MCHC RBC-ENTMCNC: 28.9 GM/DL — LOW (ref 32–36)
MCHC RBC-ENTMCNC: 28.9 GM/DL — LOW (ref 32–36)
MCV RBC AUTO: 94 FL — SIGNIFICANT CHANGE UP (ref 80–100)
MCV RBC AUTO: 94 FL — SIGNIFICANT CHANGE UP (ref 80–100)
MONOCYTES # BLD AUTO: 0.18 K/UL — SIGNIFICANT CHANGE UP (ref 0–0.9)
MONOCYTES # BLD AUTO: 0.18 K/UL — SIGNIFICANT CHANGE UP (ref 0–0.9)
MONOCYTES NFR BLD AUTO: 2.6 % — SIGNIFICANT CHANGE UP (ref 2–14)
MONOCYTES NFR BLD AUTO: 2.6 % — SIGNIFICANT CHANGE UP (ref 2–14)
MYELOCYTES NFR BLD: 1.7 % — HIGH (ref 0–0)
MYELOCYTES NFR BLD: 1.7 % — HIGH (ref 0–0)
NEUTROPHILS # BLD AUTO: 5.06 K/UL — SIGNIFICANT CHANGE UP (ref 1.8–7.4)
NEUTROPHILS # BLD AUTO: 5.06 K/UL — SIGNIFICANT CHANGE UP (ref 1.8–7.4)
NEUTROPHILS NFR BLD AUTO: 73.1 % — SIGNIFICANT CHANGE UP (ref 43–77)
NEUTROPHILS NFR BLD AUTO: 73.1 % — SIGNIFICANT CHANGE UP (ref 43–77)
NEUTS BAND # BLD: 1.7 % — SIGNIFICANT CHANGE UP (ref 0–8)
NEUTS BAND # BLD: 1.7 % — SIGNIFICANT CHANGE UP (ref 0–8)
NRBC # BLD: 1 /100 — HIGH (ref 0–0)
NRBC # BLD: 1 /100 — HIGH (ref 0–0)
PHOSPHATE SERPL-MCNC: 4.2 MG/DL — SIGNIFICANT CHANGE UP (ref 2.5–4.5)
PHOSPHATE SERPL-MCNC: 4.2 MG/DL — SIGNIFICANT CHANGE UP (ref 2.5–4.5)
PLAT MORPH BLD: NORMAL — SIGNIFICANT CHANGE UP
PLAT MORPH BLD: NORMAL — SIGNIFICANT CHANGE UP
PLATELET # BLD AUTO: 126 K/UL — LOW (ref 150–400)
PLATELET # BLD AUTO: 126 K/UL — LOW (ref 150–400)
POTASSIUM SERPL-MCNC: 3.6 MMOL/L — SIGNIFICANT CHANGE UP (ref 3.5–5.3)
POTASSIUM SERPL-MCNC: 3.6 MMOL/L — SIGNIFICANT CHANGE UP (ref 3.5–5.3)
POTASSIUM SERPL-SCNC: 3.6 MMOL/L — SIGNIFICANT CHANGE UP (ref 3.5–5.3)
POTASSIUM SERPL-SCNC: 3.6 MMOL/L — SIGNIFICANT CHANGE UP (ref 3.5–5.3)
RBC # BLD: 3.17 M/UL — LOW (ref 3.8–5.2)
RBC # BLD: 3.17 M/UL — LOW (ref 3.8–5.2)
RBC # FLD: 17 % — HIGH (ref 10.3–14.5)
RBC # FLD: 17 % — HIGH (ref 10.3–14.5)
RBC BLD AUTO: SIGNIFICANT CHANGE UP
RBC BLD AUTO: SIGNIFICANT CHANGE UP
SODIUM SERPL-SCNC: 144 MMOL/L — SIGNIFICANT CHANGE UP (ref 135–145)
SODIUM SERPL-SCNC: 144 MMOL/L — SIGNIFICANT CHANGE UP (ref 135–145)
SPECIMEN SOURCE: SIGNIFICANT CHANGE UP
WBC # BLD: 6.76 K/UL — SIGNIFICANT CHANGE UP (ref 3.8–10.5)
WBC # BLD: 6.76 K/UL — SIGNIFICANT CHANGE UP (ref 3.8–10.5)
WBC # FLD AUTO: 6.76 K/UL — SIGNIFICANT CHANGE UP (ref 3.8–10.5)
WBC # FLD AUTO: 6.76 K/UL — SIGNIFICANT CHANGE UP (ref 3.8–10.5)

## 2023-12-25 PROCEDURE — 99233 SBSQ HOSP IP/OBS HIGH 50: CPT | Mod: FS

## 2023-12-25 RX ORDER — INSULIN HUMAN 100 [IU]/ML
15 INJECTION, SOLUTION SUBCUTANEOUS
Refills: 0 | Status: DISCONTINUED | OUTPATIENT
Start: 2023-12-26 | End: 2024-01-05

## 2023-12-25 RX ORDER — ASCORBIC ACID 60 MG
500 TABLET,CHEWABLE ORAL DAILY
Refills: 0 | Status: DISCONTINUED | OUTPATIENT
Start: 2023-12-25 | End: 2024-01-17

## 2023-12-25 RX ORDER — INSULIN HUMAN 100 [IU]/ML
28 INJECTION, SOLUTION SUBCUTANEOUS
Refills: 0 | Status: DISCONTINUED | OUTPATIENT
Start: 2023-12-26 | End: 2023-12-26

## 2023-12-25 RX ADMIN — CHLORHEXIDINE GLUCONATE 1 APPLICATION(S): 213 SOLUTION TOPICAL at 06:23

## 2023-12-25 RX ADMIN — Medication 1 MILLIGRAM(S): at 21:52

## 2023-12-25 RX ADMIN — Medication 2 DROP(S): at 12:24

## 2023-12-25 RX ADMIN — Medication 1 TABLET(S): at 06:22

## 2023-12-25 RX ADMIN — Medication 1 TABLET(S): at 17:46

## 2023-12-25 RX ADMIN — LIDOCAINE 1 PATCH: 4 CREAM TOPICAL at 07:13

## 2023-12-25 RX ADMIN — Medication 3 MILLILITER(S): at 05:26

## 2023-12-25 RX ADMIN — INSULIN GLARGINE 18 UNIT(S): 100 INJECTION, SOLUTION SUBCUTANEOUS at 08:37

## 2023-12-25 RX ADMIN — NYSTATIN CREAM 1 APPLICATION(S): 100000 CREAM TOPICAL at 06:23

## 2023-12-25 RX ADMIN — INSULIN HUMAN 26 UNIT(S): 100 INJECTION, SOLUTION SUBCUTANEOUS at 06:24

## 2023-12-25 RX ADMIN — Medication 3 MILLILITER(S): at 00:10

## 2023-12-25 RX ADMIN — LIDOCAINE 5 MILLILITER(S): 4 CREAM TOPICAL at 06:22

## 2023-12-25 RX ADMIN — PHENYLEPHRINE-SHARK LIVER OIL-MINERAL OIL-PETROLATUM RECTAL OINTMENT 1 APPLICATION(S): at 10:40

## 2023-12-25 RX ADMIN — Medication 2 DROP(S): at 23:39

## 2023-12-25 RX ADMIN — CHLORHEXIDINE GLUCONATE 15 MILLILITER(S): 213 SOLUTION TOPICAL at 06:23

## 2023-12-25 RX ADMIN — PANTOPRAZOLE SODIUM 40 MILLIGRAM(S): 20 TABLET, DELAYED RELEASE ORAL at 08:33

## 2023-12-25 RX ADMIN — Medication 2: at 17:45

## 2023-12-25 RX ADMIN — Medication 1 APPLICATION(S): at 11:41

## 2023-12-25 RX ADMIN — Medication 3 MILLILITER(S): at 11:09

## 2023-12-25 RX ADMIN — ESCITALOPRAM OXALATE 10 MILLIGRAM(S): 10 TABLET, FILM COATED ORAL at 08:37

## 2023-12-25 RX ADMIN — NYSTATIN CREAM 1 APPLICATION(S): 100000 CREAM TOPICAL at 14:30

## 2023-12-25 RX ADMIN — LIDOCAINE 1 PATCH: 4 CREAM TOPICAL at 00:00

## 2023-12-25 RX ADMIN — NYSTATIN CREAM 1 APPLICATION(S): 100000 CREAM TOPICAL at 21:52

## 2023-12-25 RX ADMIN — Medication 15 MILLILITER(S): at 11:43

## 2023-12-25 RX ADMIN — Medication 1 APPLICATION(S): at 23:39

## 2023-12-25 RX ADMIN — Medication 2: at 12:14

## 2023-12-25 RX ADMIN — LIDOCAINE 5 MILLILITER(S): 4 CREAM TOPICAL at 18:42

## 2023-12-25 RX ADMIN — Medication 1: at 23:38

## 2023-12-25 RX ADMIN — SIMETHICONE 80 MILLIGRAM(S): 80 TABLET, CHEWABLE ORAL at 23:37

## 2023-12-25 RX ADMIN — INSULIN HUMAN 9 UNIT(S): 100 INJECTION, SOLUTION SUBCUTANEOUS at 17:44

## 2023-12-25 RX ADMIN — LIDOCAINE 1 PATCH: 4 CREAM TOPICAL at 11:42

## 2023-12-25 RX ADMIN — GABAPENTIN 100 MILLIGRAM(S): 400 CAPSULE ORAL at 21:52

## 2023-12-25 RX ADMIN — AMLODIPINE BESYLATE 10 MILLIGRAM(S): 2.5 TABLET ORAL at 06:22

## 2023-12-25 RX ADMIN — Medication 5 MILLIGRAM(S): at 06:22

## 2023-12-25 RX ADMIN — Medication 1 APPLICATION(S): at 17:50

## 2023-12-25 RX ADMIN — INSULIN HUMAN 13 UNIT(S): 100 INJECTION, SOLUTION SUBCUTANEOUS at 12:15

## 2023-12-25 RX ADMIN — Medication 2 DROP(S): at 06:23

## 2023-12-25 RX ADMIN — QUETIAPINE FUMARATE 12.5 MILLIGRAM(S): 200 TABLET, FILM COATED ORAL at 17:47

## 2023-12-25 RX ADMIN — CHLORHEXIDINE GLUCONATE 15 MILLILITER(S): 213 SOLUTION TOPICAL at 18:02

## 2023-12-25 RX ADMIN — LIDOCAINE 1 PATCH: 4 CREAM TOPICAL at 11:41

## 2023-12-25 RX ADMIN — Medication 1: at 06:22

## 2023-12-25 RX ADMIN — Medication 3 MILLILITER(S): at 18:05

## 2023-12-25 RX ADMIN — Medication 500 MILLIGRAM(S): at 11:43

## 2023-12-25 RX ADMIN — SIMETHICONE 80 MILLIGRAM(S): 80 TABLET, CHEWABLE ORAL at 11:43

## 2023-12-25 RX ADMIN — Medication 5 MILLIGRAM(S): at 21:53

## 2023-12-25 RX ADMIN — SIMETHICONE 80 MILLIGRAM(S): 80 TABLET, CHEWABLE ORAL at 17:46

## 2023-12-25 RX ADMIN — ZINC OXIDE 1 APPLICATION(S): 200 OINTMENT TOPICAL at 13:01

## 2023-12-25 RX ADMIN — Medication 1 APPLICATION(S): at 06:23

## 2023-12-25 RX ADMIN — Medication 3 MILLILITER(S): at 23:24

## 2023-12-25 RX ADMIN — SIMETHICONE 80 MILLIGRAM(S): 80 TABLET, CHEWABLE ORAL at 06:21

## 2023-12-25 RX ADMIN — Medication 2 DROP(S): at 17:50

## 2023-12-25 RX ADMIN — Medication 125 MICROGRAM(S): at 05:13

## 2023-12-25 NOTE — CHART NOTE - NSCHARTNOTEFT_GEN_A_CORE
72 y/o F w/h/o T2DM with unknown control due to anemia of chronic disease skewing A1C levels. On Levemir and Humalog insulin PTA. Unknown DM complications. Also h/o HTN, HLD, anemia, hypothyroidism, RA, fibromyalgia, remote cerebral aneurysm repair, acute hypercapnic respiratory failure now with tracheostomy, PEG tube, and bedbound (trach change 8/18, PEG change 8/14), sacral pressure ulcer, BIBEMS from home for 6 day hx of bleeding from the tracheostomy and PEG tube with associated abdominal pain> now resolved. Endocrinology consulted for hyperglycemia. BG Goal 100-180mg/dl  Chart reviewed. BGs variable 100s-200s with 6 am  at goal, but BGs in 200s at noon and 6pm. As per RN, pt tolerating tube feeding at goal ( 60 ml /hr )  Will adjust regular insulin doses at 6 am and noon      Tolerating TFs at goal 60 ml/hr from 6am to 2am. Last 24 hour BGs variable 100s-218. 12 noon BG in 200s for 3 days.   Diet, NPO with Tube Feed:   Tube Feeding Modality: Gastrostomy  [employer] glucose support 1.2  Total Volume for 24 Hours (mL): 1200  Continuous  Starting Tube Feed Rate {mL per Hour}: 60  Increase Tube Feed Rate by (mL): 0  Until Goal Tube Feed Rate (mL per Hour): 60  Tube Feed Duration (in Hours): 20  Tube Feed Start Time: 06:00  Tube Feed Stop Time: 02:00  Free Water Flush  Pump   Rate (mL per Hour): 10   Frequency: Every 2 Hours  Alfred(7 Gm Arginine/7 Gm Glut/1.2 Gm HMB     Qty per Day:  2 (11-29-23 @ 14:12) [Active]    POCT Blood Glucose:  212 mg/dL (12-25-23 @ 17:24)  209 mg/dL (12-25-23 @ 12:00)  137 mg/dL (12-25-23 @ 08:25)  170 mg/dL (12-25-23 @ 05:45)  159 mg/dL (12-24-23 @ 23:44)      eMAR:  insulin glargine Injectable (LANTUS)   18 Unit(s) SubCutaneous (12-25-23 @ 08:37)    insulin lispro (ADMELOG) corrective regimen sliding scale   2 Unit(s) SubCutaneous (12-25-23 @ 17:45)   2 Unit(s) SubCutaneous (12-25-23 @ 12:14)   1 Unit(s) SubCutaneous (12-25-23 @ 06:22)   1 Unit(s) SubCutaneous (12-24-23 @ 23:46)   2 Unit(s) SubCutaneous (12-24-23 @ 18:06)    insulin regular  human recombinant   13 Unit(s) SubCutaneous (12-25-23 @ 12:15)    insulin regular  human recombinant   26 Unit(s) SubCutaneous (12-25-23 @ 06:24)    insulin regular  human recombinant   9 Unit(s) SubCutaneous (12-25-23 @ 17:44)   9 Unit(s) SubCutaneous (12-24-23 @ 18:05)    levothyroxine   125 MICROGram(s) Oral (12-25-23 @ 05:13)    predniSONE   Tablet   5 milliGRAM(s) Oral (12-25-23 @ 06:22)       # Type 2 diabetes mellitus with hyperglycemia, with long-term current use of insulin.   ·  Plan: - FS BG monitoring [id number] while on TFs/NPO   - Continue lantus 18 units sc qAM   - Adjust  Regular insulin 28units sc at 6am, 15 units sc at 12 noon and 9 units sc at 6pm. PLEASE DO NOT HOLD IF PATIENT IS ON TUBE FEEDS (hold only if TF are stopped). Can decrease/adjust dose if there is a concern for hypoglycemia.   - C/w low dose admelog correction scale every 6 hours  - please inform endocrine team if there are any changes in tube feeding( formula or rate)   - Will follow and adjust insulin as needed    Nissa Veliz NP-C   Contact via [employer] Teams during business hours  To reach covering provider access AMION via sun[employer] tools  For Urgent matters/after-hours/weekends/holidays please page endocrine fellow on call   For nonurgent matters please email [email or web address]    Please note that this patient may be followed by different provider tomorrow.  Notify endocrine 24 hours prior to discharge for final recommendations 72 y/o F w/h/o T2DM with unknown control due to anemia of chronic disease skewing A1C levels. On Levemir and Humalog insulin PTA. Unknown DM complications. Also h/o HTN, HLD, anemia, hypothyroidism, RA, fibromyalgia, remote cerebral aneurysm repair, acute hypercapnic respiratory failure now with tracheostomy, PEG tube, and bedbound (trach change 8/18, PEG change 8/14), sacral pressure ulcer, BIBEMS from home for 6 day hx of bleeding from the tracheostomy and PEG tube with associated abdominal pain> now resolved. Endocrinology consulted for hyperglycemia. BG Goal 100-180mg/dl  Chart reviewed. BGs variable 100s-200s with 6 am  at goal, but BGs in 200s at noon and 6pm. As per RN, pt tolerating tube feeding at goal ( 60 ml /hr )  Will adjust regular insulin doses at 6 am and noon      Tolerating TFs at goal 60 ml/hr from 6am to 2am. Last 24 hour BGs variable 100s-218. 12 noon BG in 200s for 3 days.   Diet, NPO with Tube Feed:   Tube Feeding Modality: Gastrostomy  [employer] glucose support 1.2  Total Volume for 24 Hours (mL): 1200  Continuous  Starting Tube Feed Rate {mL per Hour}: 60  Increase Tube Feed Rate by (mL): 0  Until Goal Tube Feed Rate (mL per Hour): 60  Tube Feed Duration (in Hours): 20  Tube Feed Start Time: 06:00  Tube Feed Stop Time: 02:00  Free Water Flush  Pump   Rate (mL per Hour): 10   Frequency: Every 2 Hours  Alfred(7 Gm Arginine/7 Gm Glut/1.2 Gm HMB     Qty per Day:  2 (11-29-23 @ 14:12) [Active]    POCT Blood Glucose:  212 mg/dL (12-25-23 @ 17:24)  209 mg/dL (12-25-23 @ 12:00)  137 mg/dL (12-25-23 @ 08:25)  170 mg/dL (12-25-23 @ 05:45)  159 mg/dL (12-24-23 @ 23:44)      eMAR:  insulin glargine Injectable (LANTUS)   18 Unit(s) SubCutaneous (12-25-23 @ 08:37)    insulin lispro (ADMELOG) corrective regimen sliding scale   2 Unit(s) SubCutaneous (12-25-23 @ 17:45)   2 Unit(s) SubCutaneous (12-25-23 @ 12:14)   1 Unit(s) SubCutaneous (12-25-23 @ 06:22)   1 Unit(s) SubCutaneous (12-24-23 @ 23:46)   2 Unit(s) SubCutaneous (12-24-23 @ 18:06)    insulin regular  human recombinant   13 Unit(s) SubCutaneous (12-25-23 @ 12:15)    insulin regular  human recombinant   26 Unit(s) SubCutaneous (12-25-23 @ 06:24)    insulin regular  human recombinant   9 Unit(s) SubCutaneous (12-25-23 @ 17:44)   9 Unit(s) SubCutaneous (12-24-23 @ 18:05)    levothyroxine   125 MICROGram(s) Oral (12-25-23 @ 05:13)    predniSONE   Tablet   5 milliGRAM(s) Oral (12-25-23 @ 06:22)       # Type 2 diabetes mellitus with hyperglycemia, with long-term current use of insulin.   ·  Plan: - FS BG monitoring [id number] while on TFs/NPO   - Continue lantus 18 units sc qAM   - Adjust  Regular insulin 28units sc at 6am, 15 units sc at 12 noon and 9 units sc at 6pm. PLEASE DO NOT HOLD IF PATIENT IS ON TUBE FEEDS (hold only if TF are stopped). Can decrease/adjust dose if there is a concern for hypoglycemia.   - C/w low dose admelog correction scale every 6 hours  - please inform endocrine team if there are any changes in tube feeding( formula or rate)   - Will follow and adjust insulin as needed    Nissa Veliz NP-C   Contact via [employer] Teams during business hours  To reach covering provider access AMION via [employer] tools  For Urgent matters/after-hours/weekends/holidays please page endocrine fellow on call   For nonurgent matters please email [email or web address]    Please note that this patient may be followed by different provider tomorrow.  Notify endocrine 24 hours prior to discharge for final recommendations

## 2023-12-25 NOTE — PROGRESS NOTE ADULT - SUBJECTIVE AND OBJECTIVE BOX
Patient is a 73y old  Female who presents with a chief complaint of PEG Bleeding (22 Dec 2023 07:35)      Interval Events:    REVIEW OF SYSTEMS:  [ ] Positive  [ ] All other systems negative  [ ] Unable to assess ROS because ________    Vital Signs Last 24 Hrs  T(C): 36.6 (12-25-23 @ 05:55), Max: 37.2 (12-24-23 @ 22:36)  T(F): 97.9 (12-25-23 @ 05:55), Max: 98.9 (12-24-23 @ 22:36)  HR: 90 (12-25-23 @ 05:55) (88 - 103)  BP: 119/59 (12-25-23 @ 05:55) (116/57 - 139/71)  RR: 15 (12-25-23 @ 05:55) (15 - 22)  SpO2: 100% (12-25-23 @ 05:55) (98% - 100%)    PHYSICAL EXAM:  HEENT:   [ ]Tracheostomy:  [ ]Pupils equal  [ ]No oral lesions  [ ]Abnormal    SKIN  [ ]No Rash  [ ] Abnormal  [ ] pressure    CARDIAC  [ ]Regular  [ ]Abnormal    PULMONARY  [ ]Bilateral Clear Breath Sounds  [ ]Normal Excursion  [ ]Abnormal    GI  [ ]PEG      [ ] +BS		              [ ]Soft, nondistended, nontender	  [ ]Abnormal    MUSCULOSKELETAL                                   [ ]Bedbound                 [ ]Abnormal    [ ]Ambulatory/OOB to chair                           EXTREMITIES                                         [ ]Normal  [ ]Edema                           NEUROLOGIC  [ ] Normal, non focal  [ ] Focal findings:    PSYCHIATRIC  [ ]Alert and appropriate  [ ] Sedated	 [ ]Agitated    :  Mcbride: [ ] Yes, if yes: Date of Placement:                   [  ] No    LINES: Central Lines [ ] Yes, if yes: Date of Placement                                     [  ] No    HOSPITAL MEDICATIONS:  MEDICATIONS  (STANDING):  albuterol/ipratropium for Nebulization 3 milliLiter(s) Nebulizer every 6 hours  amLODIPine   Tablet 10 milliGRAM(s) Oral daily  artificial  tears Solution 2 Drop(s) Both EYES four times a day  ascorbic acid 500 milliGRAM(s) Oral daily  calcium carbonate    500 mG (Tums) Chewable 1 Tablet(s) Chew every 12 hours  chlorhexidine 0.12% Liquid 15 milliLiter(s) Oral Mucosa every 12 hours  chlorhexidine 4% Liquid 1 Application(s) Topical <User Schedule>  dextrose 50% Injectable 25 Gram(s) IV Push once  dextrose 50% Injectable 12.5 Gram(s) IV Push once  escitalopram 10 milliGRAM(s) Oral <User Schedule>  fentaNYL   Patch  25 MICROgram(s)/Hr 1 Patch Transdermal every 72 hours  gabapentin Solution 100 milliGRAM(s) Enteral Tube at bedtime  glucagon  Injectable 1 milliGRAM(s) IntraMuscular once  hemorrhoidal Ointment      hemorrhoidal Ointment 1 Application(s) Rectal daily  insulin glargine Injectable (LANTUS) 18 Unit(s) SubCutaneous every morning  insulin lispro (ADMELOG) corrective regimen sliding scale   SubCutaneous every 6 hours  insulin regular  human recombinant 13 Unit(s) SubCutaneous <User Schedule>  insulin regular  human recombinant 26 Unit(s) SubCutaneous <User Schedule>  insulin regular  human recombinant 9 Unit(s) SubCutaneous <User Schedule>  levothyroxine 125 MICROGram(s) Oral <User Schedule>  lidocaine   4% Patch 1 Patch Transdermal daily  lidocaine   4% Patch 1 Patch Transdermal daily  lidocaine 2% Viscous 5 milliLiter(s) Mucosal two times a day  melatonin 5 milliGRAM(s) Oral at bedtime  melatonin 1 milliGRAM(s) Oral at bedtime  multivitamin/minerals/iron Oral Solution (CENTRUM) 15 milliLiter(s) Oral daily  nystatin Powder 1 Application(s) Topical every 8 hours  pantoprazole   Suspension 40 milliGRAM(s) Enteral Tube <User Schedule>  petrolatum Ophthalmic Ointment 1 Application(s) Both EYES four times a day  predniSONE   Tablet 5 milliGRAM(s) Oral daily  QUEtiapine 12.5 milliGRAM(s) Oral <User Schedule>  silver nitrate Applicator 1 Application(s) Topical once  simethicone 80 milliGRAM(s) Chew four times a day  zinc oxide 40% Paste 1 Application(s) Topical daily    MEDICATIONS  (PRN):  acetaminophen   Oral Liquid .. 1000 milliGRAM(s) Enteral Tube every 6 hours PRN Temp greater or equal to 38C (100.4F), Mild Pain (1 - 3)  benzocaine 20% Spray 1 Spray(s) Topical four times a day PRN Cough  diclofenac sodium 1% Gel 2 Gram(s) Topical four times a day PRN pain  diphenhydrAMINE Elixir 25 milliGRAM(s) Oral every 6 hours PRN Rash and/or Itching  guaifenesin/dextromethorphan Oral Liquid 10 milliLiter(s) Oral every 6 hours PRN Cough  oxyCODONE    Solution 2.5 milliGRAM(s) Oral every 6 hours PRN Moderate Pain (4 - 6)  sodium chloride 0.65% Nasal 1 Spray(s) Both Nostrils every 6 hours PRN Nasal Congestion      LABS:                        8.6    6.76  )-----------( 126      ( 25 Dec 2023 06:40 )             29.8     12-25    144  |  103  |  31<H>  ----------------------------<  166<H>  3.6   |  32<H>  |  <0.30<L>    Ca    10.7<H>      25 Dec 2023 06:40  Phos  4.2     12-25  Mg     1.9     12-25        Urinalysis Basic - ( 25 Dec 2023 06:40 )    Color: x / Appearance: x / SG: x / pH: x  Gluc: 166 mg/dL / Ketone: x  / Bili: x / Urobili: x   Blood: x / Protein: x / Nitrite: x   Leuk Esterase: x / RBC: x / WBC x   Sq Epi: x / Non Sq Epi: x / Bacteria: x          CAPILLARY BLOOD GLUCOSE    MICROBIOLOGY:     RADIOLOGY:  [ ] Reviewed and interpreted by me    Mode: AC/ CMV (Assist Control/ Continuous Mandatory Ventilation)  RR (machine): 12  TV (machine): 300  FiO2: 30  PEEP: 5  ITime: 1  MAP: 11  PIP: 30   Patient is a 73y old  Female who presents with a chief complaint of PEG Bleeding (22 Dec 2023 07:35)      Interval Events: no events over night.    REVIEW OF SYSTEMS:  [ ] Positive  [X] All other systems negative  [ ] Unable to assess ROS because ________    Vital Signs Last 24 Hrs  T(C): 36.6 (12-25-23 @ 05:55), Max: 37.2 (12-24-23 @ 22:36)  T(F): 97.9 (12-25-23 @ 05:55), Max: 98.9 (12-24-23 @ 22:36)  HR: 90 (12-25-23 @ 05:55) (88 - 103)  BP: 119/59 (12-25-23 @ 05:55) (116/57 - 139/71)  RR: 15 (12-25-23 @ 05:55) (15 - 22)  SpO2: 100% (12-25-23 @ 05:55) (98% - 100%)    PHYSICAL EXAM:  HEENT:   [X]Tracheostomy: #8 distal XLT [place]  [X]Pupils equal  [ ]No oral lesions  [X] Abnormal: missing dentition; +teeth caries; +loose bottom teeth    SKIN  [ ]No Rash  [ ] Abnormal  [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    CARDIAC  [X]Regular  [ ]Abnormal    PULMONARY  [ ]Bilateral Clear Breath Sounds  [ ]Normal Excursion  [X]Abnormal - BL Coarse BS    GI  [X]PEG      [X] +BS		              [X]Soft, nondistended, nontender	  [ ]Abnormal    MUSCULOSKELETAL                                   [X]Bedbound                 [ ]Abnormal    [ ]Ambulatory/OOB to chair                           EXTREMITIES                                         [X]Normal  [ ]Edema                           NEUROLOGIC  [ ] Normal, non focal  [X] Focal findings: opens eyes spontaneously, follows commands on all 4 extremities    PSYCHIATRIC  [X]Alert and appropriate  [ ] Sedated	 [ ]Agitated    :  Mcbride: [ ] Yes, if yes: Date of Placement:                   [X] No    LINES: Central Lines [ ] Yes, if yes: Date of Placement                                     [X] No      HOSPITAL MEDICATIONS:  MEDICATIONS  (STANDING):  albuterol/ipratropium for Nebulization 3 milliLiter(s) Nebulizer every 6 hours  amLODIPine   Tablet 10 milliGRAM(s) Oral daily  artificial  tears Solution 2 Drop(s) Both EYES four times a day  ascorbic acid 500 milliGRAM(s) Oral daily  calcium carbonate    500 mG (Tums) Chewable 1 Tablet(s) Chew every 12 hours  chlorhexidine 0.12% Liquid 15 milliLiter(s) Oral Mucosa every 12 hours  chlorhexidine 4% Liquid 1 Application(s) Topical <User Schedule>  dextrose 50% Injectable 25 Gram(s) IV Push once  dextrose 50% Injectable 12.5 Gram(s) IV Push once  escitalopram 10 milliGRAM(s) Oral <User Schedule>  fentaNYL   Patch  25 MICROgram(s)/Hr 1 Patch Transdermal every 72 hours  gabapentin Solution 100 milliGRAM(s) Enteral Tube at bedtime  glucagon  Injectable 1 milliGRAM(s) IntraMuscular once  hemorrhoidal Ointment      hemorrhoidal Ointment 1 Application(s) Rectal daily  insulin glargine Injectable (LANTUS) 18 Unit(s) SubCutaneous every morning  insulin lispro (ADMELOG) corrective regimen sliding scale   SubCutaneous every 6 hours  insulin regular  human recombinant 13 Unit(s) SubCutaneous <User Schedule>  insulin regular  human recombinant 26 Unit(s) SubCutaneous <User Schedule>  insulin regular  human recombinant 9 Unit(s) SubCutaneous <User Schedule>  levothyroxine 125 MICROGram(s) Oral <User Schedule>  lidocaine   4% Patch 1 Patch Transdermal daily  lidocaine   4% Patch 1 Patch Transdermal daily  lidocaine 2% Viscous 5 milliLiter(s) Mucosal two times a day  melatonin 5 milliGRAM(s) Oral at bedtime  melatonin 1 milliGRAM(s) Oral at bedtime  multivitamin/minerals/iron Oral Solution (CENTRUM) 15 milliLiter(s) Oral daily  nystatin Powder 1 Application(s) Topical every 8 hours  pantoprazole   Suspension 40 milliGRAM(s) Enteral Tube <User Schedule>  petrolatum Ophthalmic Ointment 1 Application(s) Both EYES four times a day  predniSONE   Tablet 5 milliGRAM(s) Oral daily  QUEtiapine 12.5 milliGRAM(s) Oral <User Schedule>  silver nitrate Applicator 1 Application(s) Topical once  simethicone 80 milliGRAM(s) Chew four times a day  zinc oxide 40% Paste 1 Application(s) Topical daily    MEDICATIONS  (PRN):  acetaminophen   Oral Liquid .. 1000 milliGRAM(s) Enteral Tube every 6 hours PRN Temp greater or equal to 38C (100.4F), Mild Pain (1 - 3)  benzocaine 20% Spray 1 Spray(s) Topical four times a day PRN Cough  diclofenac sodium 1% Gel 2 Gram(s) Topical four times a day PRN pain  diphenhydrAMINE Elixir 25 milliGRAM(s) Oral every 6 hours PRN Rash and/or Itching  guaifenesin/dextromethorphan Oral Liquid 10 milliLiter(s) Oral every 6 hours PRN Cough  oxyCODONE    Solution 2.5 milliGRAM(s) Oral every 6 hours PRN Moderate Pain (4 - 6)  sodium chloride 0.65% Nasal 1 Spray(s) Both Nostrils every 6 hours PRN Nasal Congestion      LABS:                        8.6    6.76  )-----------( 126      ( 25 Dec 2023 06:40 )             29.8     12-25    144  |  103  |  31<H>  ----------------------------<  166<H>  3.6   |  32<H>  |  <0.30<L>    Ca    10.7<H>      25 Dec 2023 06:40  Phos  4.2     12-25  Mg     1.9     12-25        Urinalysis Basic - ( 25 Dec 2023 06:40 )    Color: x / Appearance: x / SG: x / pH: x  Gluc: 166 mg/dL / Ketone: x  / Bili: x / Urobili: x   Blood: x / Protein: x / Nitrite: x   Leuk Esterase: x / RBC: x / WBC x   Sq Epi: x / Non Sq Epi: x / Bacteria: x          CAPILLARY BLOOD GLUCOSE    MICROBIOLOGY:     RADIOLOGY:  [ ] Reviewed and interpreted by me    Mode: AC/ CMV (Assist Control/ Continuous Mandatory Ventilation)  RR (machine): 12  TV (machine): 300  FiO2: 30  PEEP: 5  ITime: 1  MAP: 11  PIP: 30

## 2023-12-25 NOTE — PROGRESS NOTE ADULT - PROBLEM SELECTOR PLAN 1
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 aureus  - Sputum Cx 11/6: + PSA and Serratia, Cefepime 11/8 -->11/15  - 11/26 sputum culture MDR pseudomonas - clinically stable, defer treatment unless decompensates as per ID  - 11/26 urine culture - enterococcus - no need to treat - cfu low, organism not significant as per ID  - 12/19 respiked fever - blood cultures NGTD - received dose of vanco and cefepime - d/c'd, holding antibiotics until further results

## 2023-12-25 NOTE — PROGRESS NOTE ADULT - ASSESSMENT
72 y/o F with PMHx of T2DM, HTN, HLD, anemia, hypothyroidism, RA, fibromyalgia, remote cerebral aneurysm repair, acute hypercapnic respiratory failure now with tracheostomy, PEG tube, and bedbound (trach change 8/18, PEG change 8/14), sacral pressure ulcer, BIBEMS from home for 6 day hx of bleeding from the tracheostomy and PEG tube with associated abdominal pain, recent history of auditory and visual hallucinations, and with chronic sacral decubitus ulcer. Exam notable for mild abdominal distention with LLQ and RLQ tenderness, tracheostomy in place with no obvious bleeding, PEG tube with scant amount of dried blood, at baseline neurologic status. Imaging showing no active bleeding around tracheostomy site, however was notable for mild thickening along PEG tube tract, sacral ulcer extending to the coccyx suggestive of possible osteomyelitis, and bibasilar patchy lung opacities with volume loss. Patient admitted for respiratory support, wound care of tracheostomy and PEG, and management of sacral osteomyelitis. No further Trach and peg site bleeding noted since admission.  Pelvic MRI imaging also suggestive of Acute OM. Patient placed on long-term antibiotics with Infectious disease guidance.  Additionally treated with a 7d course of Cefepime due to PSA and Serratia tracheitis on 11/8-->11/15 and then resumed cipro/keflex.  Hospital course c/b Delirium for which Seroquel was added and home Lexapro continued. Tracheostomy changed to #9 Cuffed [place] by ENT 11/3.  Despite trach change patient remained with persistent air leak.  On 11/19, the trach was again changed due to leak and loss of volumes on vent.  Trach was changed to #8 distal cuffed Shiley.  Patient seen by Dermatology for back lesions.  One diagnosed as a seborrheic keratitis and the other a dermatofibroma.  Intermittent abdominal pain throughout hospital course, at times relieved with gas/BM, w/u negative, seen by GI - no recs.  12/10 pt completed cipro and keflex for osteo treatment.  12/13 moderate amounts of secretions w/ blood - tranexamic acid neb given with notable improvement.  12/18 with blood tinged secretion again - TXA 250mg neb x2 doses.  12/19 spiked fever to 102 - pancultured, results still pending.    12/24; Occasional small leakage around PEG site. Blood cultures NGTD x 4 days. Patient is clinically stable for discharge. 74 y/o F with PMHx of T2DM, HTN, HLD, anemia, hypothyroidism, RA, fibromyalgia, remote cerebral aneurysm repair, acute hypercapnic respiratory failure now with tracheostomy, PEG tube, and bedbound (trach change 8/18, PEG change 8/14), sacral pressure ulcer, BIBEMS from home for 6 day hx of bleeding from the tracheostomy and PEG tube with associated abdominal pain, recent history of auditory and visual hallucinations, and with chronic sacral decubitus ulcer. Exam notable for mild abdominal distention with LLQ and RLQ tenderness, tracheostomy in place with no obvious bleeding, PEG tube with scant amount of dried blood, at baseline neurologic status. Imaging showing no active bleeding around tracheostomy site, however was notable for mild thickening along PEG tube tract, sacral ulcer extending to the coccyx suggestive of possible osteomyelitis, and bibasilar patchy lung opacities with volume loss. Patient admitted for respiratory support, wound care of tracheostomy and PEG, and management of sacral osteomyelitis. No further Trach and peg site bleeding noted since admission.  Pelvic MRI imaging also suggestive of Acute OM. Patient placed on long-term antibiotics with Infectious disease guidance.  Additionally treated with a 7d course of Cefepime due to PSA and Serratia tracheitis on 11/8-->11/15 and then resumed cipro/keflex.  Hospital course c/b Delirium for which Seroquel was added and home Lexapro continued. Tracheostomy changed to #9 Cuffed [place] by ENT 11/3.  Despite trach change patient remained with persistent air leak.  On 11/19, the trach was again changed due to leak and loss of volumes on vent.  Trach was changed to #8 distal cuffed Shiley.  Patient seen by Dermatology for back lesions.  One diagnosed as a seborrheic keratitis and the other a dermatofibroma.  Intermittent abdominal pain throughout hospital course, at times relieved with gas/BM, w/u negative, seen by GI - no recs.  12/10 pt completed cipro and keflex for osteo treatment.  12/13 moderate amounts of secretions w/ blood - tranexamic acid neb given with notable improvement.  12/18 with blood tinged secretion again - TXA 250mg neb x2 doses.  12/19 spiked fever to 102 - pancultured, results still pending.    12/24; Occasional small leakage around PEG site. Blood cultures NGTD x 4 days. Patient is clinically stable for discharge. 74 y/o F with PMHx of T2DM, HTN, HLD, anemia, hypothyroidism, RA, fibromyalgia, remote cerebral aneurysm repair, acute hypercapnic respiratory failure now with tracheostomy, PEG tube, and bedbound (trach change 8/18, PEG change 8/14), sacral pressure ulcer, BIBEMS from home for 6 day hx of bleeding from the tracheostomy and PEG tube with associated abdominal pain, recent history of auditory and visual hallucinations, and with chronic sacral decubitus ulcer. Exam notable for mild abdominal distention with LLQ and RLQ tenderness, tracheostomy in place with no obvious bleeding, PEG tube with scant amount of dried blood, at baseline neurologic status. Imaging showing no active bleeding around tracheostomy site, however was notable for mild thickening along PEG tube tract, sacral ulcer extending to the coccyx suggestive of possible osteomyelitis, and bibasilar patchy lung opacities with volume loss. Patient admitted for respiratory support, wound care of tracheostomy and PEG, and management of sacral osteomyelitis. No further Trach and peg site bleeding noted since admission.  Pelvic MRI imaging also suggestive of Acute OM. Patient placed on long-term antibiotics with Infectious disease guidance.  Additionally treated with a 7d course of Cefepime due to PSA and Serratia tracheitis on 11/8-->11/15 and then resumed cipro/keflex.  Hospital course c/b Delirium for which Seroquel was added and home Lexapro continued. Tracheostomy changed to #9 Cuffed [place] by ENT 11/3.  Despite trach change patient remained with persistent air leak.  On 11/19, the trach was again changed due to leak and loss of volumes on vent.  Trach was changed to #8 distal cuffed [place]iley.  Patient seen by Dermatology for back lesions.  One diagnosed as a seborrheic keratitis and the other a dermatofibroma.  Intermittent abdominal pain throughout hospital course, at times relieved with gas/BM, w/u negative, seen by GI - no recs.  12/10 pt completed cipro and keflex for osteo treatment.  12/13 moderate amounts of secretions w/ blood - tranexamic acid neb given with notable improvement.  12/18 with blood tinged secretion again - TXA 250mg neb x2 doses.  12/19 spiked fever to 102 - pancultured, results still pending.    12/25:  No acute events.  Afebrile off antibiotics, blood and urine cultures from 12/19 remain without growth. 72 y/o F with PMHx of T2DM, HTN, HLD, anemia, hypothyroidism, RA, fibromyalgia, remote cerebral aneurysm repair, acute hypercapnic respiratory failure now with tracheostomy, PEG tube, and bedbound (trach change 8/18, PEG change 8/14), sacral pressure ulcer, BIBEMS from home for 6 day hx of bleeding from the tracheostomy and PEG tube with associated abdominal pain, recent history of auditory and visual hallucinations, and with chronic sacral decubitus ulcer. Exam notable for mild abdominal distention with LLQ and RLQ tenderness, tracheostomy in place with no obvious bleeding, PEG tube with scant amount of dried blood, at baseline neurologic status. Imaging showing no active bleeding around tracheostomy site, however was notable for mild thickening along PEG tube tract, sacral ulcer extending to the coccyx suggestive of possible osteomyelitis, and bibasilar patchy lung opacities with volume loss. Patient admitted for respiratory support, wound care of tracheostomy and PEG, and management of sacral osteomyelitis. No further Trach and peg site bleeding noted since admission.  Pelvic MRI imaging also suggestive of Acute OM. Patient placed on long-term antibiotics with Infectious disease guidance.  Additionally treated with a 7d course of Cefepime due to PSA and Serratia tracheitis on 11/8-->11/15 and then resumed cipro/keflex.  Hospital course c/b Delirium for which Seroquel was added and home Lexapro continued. Tracheostomy changed to #9 Cuffed [place] by ENT 11/3.  Despite trach change patient remained with persistent air leak.  On 11/19, the trach was again changed due to leak and loss of volumes on vent.  Trach was changed to #8 distal cuffed [place].  Patient seen by Dermatology for back lesions.  One diagnosed as a seborrheic keratitis and the other a dermatofibroma.  Intermittent abdominal pain throughout hospital course, at times relieved with gas/BM, w/u negative, seen by GI - no recs.  12/10 pt completed cipro and keflex for osteo treatment.  12/13 moderate amounts of secretions w/ blood - tranexamic acid neb given with notable improvement.  12/18 with blood tinged secretion again - TXA 250mg neb x2 doses.  12/19 spiked fever to 102 - pancultured, results still pending.    12/25:  No acute events.  Afebrile off antibiotics, blood and urine cultures from 12/19 remain without growth.

## 2023-12-25 NOTE — PROGRESS NOTE ADULT - PROBLEM SELECTOR PLAN 8
- s/p Home Levemir 14 units in morning held on admission as noted w/ hypoglycemia   - Enteral feed changed to [employer] diabetic formula; glucose numbers stabilizing  - adjustments made throughout admission per endocrine recs - s/p Home Levemir 14 units in morning held on admission as noted w/ hypoglycemia   - Enteral feed changed to [employer] diabetic formula; glucose numbers stabilizing  - adjustments made throughout admission per endocrine recs

## 2023-12-25 NOTE — PROGRESS NOTE ADULT - PROBLEM SELECTOR PLAN 10
DVT PPx: Lovenox discontinued given daughter's concern for trach and PEG stoma bleeding/oozing.  Also with mild thrombocytopenia.  Will continue SCDs.

## 2023-12-26 LAB
GLUCOSE BLDC GLUCOMTR-MCNC: 126 MG/DL — HIGH (ref 70–99)
GLUCOSE BLDC GLUCOMTR-MCNC: 126 MG/DL — HIGH (ref 70–99)
GLUCOSE BLDC GLUCOMTR-MCNC: 182 MG/DL — HIGH (ref 70–99)
GLUCOSE BLDC GLUCOMTR-MCNC: 182 MG/DL — HIGH (ref 70–99)
GLUCOSE BLDC GLUCOMTR-MCNC: 201 MG/DL — HIGH (ref 70–99)
GLUCOSE BLDC GLUCOMTR-MCNC: 201 MG/DL — HIGH (ref 70–99)
GLUCOSE BLDC GLUCOMTR-MCNC: 211 MG/DL — HIGH (ref 70–99)

## 2023-12-26 PROCEDURE — 99232 SBSQ HOSP IP/OBS MODERATE 35: CPT | Mod: FS

## 2023-12-26 RX ORDER — INSULIN HUMAN 100 [IU]/ML
30 INJECTION, SOLUTION SUBCUTANEOUS
Refills: 0 | Status: DISCONTINUED | OUTPATIENT
Start: 2023-12-27 | End: 2023-12-28

## 2023-12-26 RX ADMIN — Medication 3 MILLILITER(S): at 17:27

## 2023-12-26 RX ADMIN — LIDOCAINE 1 PATCH: 4 CREAM TOPICAL at 20:32

## 2023-12-26 RX ADMIN — Medication 1 APPLICATION(S): at 11:54

## 2023-12-26 RX ADMIN — Medication 1: at 05:48

## 2023-12-26 RX ADMIN — Medication 1 MILLIGRAM(S): at 21:21

## 2023-12-26 RX ADMIN — Medication 2: at 12:07

## 2023-12-26 RX ADMIN — LIDOCAINE 5 MILLILITER(S): 4 CREAM TOPICAL at 18:22

## 2023-12-26 RX ADMIN — LIDOCAINE 5 MILLILITER(S): 4 CREAM TOPICAL at 05:49

## 2023-12-26 RX ADMIN — Medication 2 DROP(S): at 23:22

## 2023-12-26 RX ADMIN — Medication 2 DROP(S): at 11:55

## 2023-12-26 RX ADMIN — Medication 3 MILLILITER(S): at 11:34

## 2023-12-26 RX ADMIN — LIDOCAINE 1 PATCH: 4 CREAM TOPICAL at 11:54

## 2023-12-26 RX ADMIN — SIMETHICONE 80 MILLIGRAM(S): 80 TABLET, CHEWABLE ORAL at 18:05

## 2023-12-26 RX ADMIN — CHLORHEXIDINE GLUCONATE 1 APPLICATION(S): 213 SOLUTION TOPICAL at 05:50

## 2023-12-26 RX ADMIN — Medication 5 MILLIGRAM(S): at 21:21

## 2023-12-26 RX ADMIN — SIMETHICONE 80 MILLIGRAM(S): 80 TABLET, CHEWABLE ORAL at 05:46

## 2023-12-26 RX ADMIN — LIDOCAINE 1 PATCH: 4 CREAM TOPICAL at 23:14

## 2023-12-26 RX ADMIN — QUETIAPINE FUMARATE 12.5 MILLIGRAM(S): 200 TABLET, FILM COATED ORAL at 18:05

## 2023-12-26 RX ADMIN — Medication 3 MILLILITER(S): at 05:15

## 2023-12-26 RX ADMIN — ZINC OXIDE 1 APPLICATION(S): 200 OINTMENT TOPICAL at 18:52

## 2023-12-26 RX ADMIN — Medication 2 DROP(S): at 18:53

## 2023-12-26 RX ADMIN — INSULIN HUMAN 15 UNIT(S): 100 INJECTION, SOLUTION SUBCUTANEOUS at 12:08

## 2023-12-26 RX ADMIN — INSULIN GLARGINE 18 UNIT(S): 100 INJECTION, SOLUTION SUBCUTANEOUS at 08:51

## 2023-12-26 RX ADMIN — Medication 15 MILLILITER(S): at 11:55

## 2023-12-26 RX ADMIN — LIDOCAINE 1 PATCH: 4 CREAM TOPICAL at 00:00

## 2023-12-26 RX ADMIN — CHLORHEXIDINE GLUCONATE 15 MILLILITER(S): 213 SOLUTION TOPICAL at 18:04

## 2023-12-26 RX ADMIN — Medication 1 TABLET(S): at 05:46

## 2023-12-26 RX ADMIN — LIDOCAINE 1 PATCH: 4 CREAM TOPICAL at 11:53

## 2023-12-26 RX ADMIN — GABAPENTIN 100 MILLIGRAM(S): 400 CAPSULE ORAL at 21:21

## 2023-12-26 RX ADMIN — NYSTATIN CREAM 1 APPLICATION(S): 100000 CREAM TOPICAL at 21:21

## 2023-12-26 RX ADMIN — NYSTATIN CREAM 1 APPLICATION(S): 100000 CREAM TOPICAL at 13:24

## 2023-12-26 RX ADMIN — Medication 1 APPLICATION(S): at 05:48

## 2023-12-26 RX ADMIN — CHLORHEXIDINE GLUCONATE 15 MILLILITER(S): 213 SOLUTION TOPICAL at 05:50

## 2023-12-26 RX ADMIN — NYSTATIN CREAM 1 APPLICATION(S): 100000 CREAM TOPICAL at 05:48

## 2023-12-26 RX ADMIN — PHENYLEPHRINE-SHARK LIVER OIL-MINERAL OIL-PETROLATUM RECTAL OINTMENT 1 APPLICATION(S): at 10:30

## 2023-12-26 RX ADMIN — Medication 1 APPLICATION(S): at 18:04

## 2023-12-26 RX ADMIN — Medication 125 MICROGRAM(S): at 05:49

## 2023-12-26 RX ADMIN — Medication 500 MILLIGRAM(S): at 11:54

## 2023-12-26 RX ADMIN — Medication 5 MILLIGRAM(S): at 05:46

## 2023-12-26 RX ADMIN — Medication 1 TABLET(S): at 18:04

## 2023-12-26 RX ADMIN — SIMETHICONE 80 MILLIGRAM(S): 80 TABLET, CHEWABLE ORAL at 11:54

## 2023-12-26 RX ADMIN — Medication 2 DROP(S): at 05:51

## 2023-12-26 RX ADMIN — SIMETHICONE 80 MILLIGRAM(S): 80 TABLET, CHEWABLE ORAL at 23:22

## 2023-12-26 RX ADMIN — Medication 2: at 18:03

## 2023-12-26 RX ADMIN — Medication 1 APPLICATION(S): at 23:21

## 2023-12-26 RX ADMIN — AMLODIPINE BESYLATE 10 MILLIGRAM(S): 2.5 TABLET ORAL at 05:45

## 2023-12-26 RX ADMIN — ESCITALOPRAM OXALATE 10 MILLIGRAM(S): 10 TABLET, FILM COATED ORAL at 08:52

## 2023-12-26 RX ADMIN — LIDOCAINE 1 PATCH: 4 CREAM TOPICAL at 07:14

## 2023-12-26 RX ADMIN — INSULIN HUMAN 28 UNIT(S): 100 INJECTION, SOLUTION SUBCUTANEOUS at 05:49

## 2023-12-26 RX ADMIN — PANTOPRAZOLE SODIUM 40 MILLIGRAM(S): 20 TABLET, DELAYED RELEASE ORAL at 08:51

## 2023-12-26 RX ADMIN — INSULIN HUMAN 9 UNIT(S): 100 INJECTION, SOLUTION SUBCUTANEOUS at 18:03

## 2023-12-26 NOTE — CHART NOTE - NSCHARTNOTEFT_GEN_A_CORE
Nutrition Follow Up Note  Patient seen for: follow up    Source: [] Patient       [x] Medical Record        [x] Nursing        [] Family at bedside       [] Other:    -If unable to interview patient: [x] Trach/Vent/BiPAP  [] Disoriented/confused/inappropriate to interview    Diet Order:   Diet, NPO with Tube Feed:   Tube Feeding Modality: Gastrostomy  Casie Frazier glucose support 1.2  Total Volume for 24 Hours (mL): 1200  Continuous  Starting Tube Feed Rate {mL per Hour}: 60  Increase Tube Feed Rate by (mL): 0  Until Goal Tube Feed Rate (mL per Hour): 60  Tube Feed Duration (in Hours): 20  Tube Feed Start Time: 06:00  Tube Feed Stop Time: 02:00  Free Water Flush  Pump   Rate (mL per Hour): 10   Frequency: Every 2 Hours  Alfred(7 Gm Arginine/7 Gm Glut/1.2 Gm HMB     Qty per Day:  2 (23)    EN order providing if 100% provision: 1440kcal and 77g protein.     - Is current order appropriate/adequate? [x] Yes  []  No:     Nutrition related concerns:  -Free water flush per team, above  -Hgb A1c 7.5%. POCT Blood glucose being monitored. on insulin regimen to maintain glycemic control. Formula changed from Peptide to glucose support to aid in glycemic control.   -oral synthroid ordered  -Pt vegan, but per previous discussion with daughter, Alfred is okay. See nutrition note .     GI:  Last BM .  Bowel Regimen? [] Yes   [x] No    Weights:   Dosing weight 54.6kg  10/29:  reports pt UBW 90 pounds prior to illness (~41kg).   Daily Weight in k.3 () 54.4 (), Daily Weight in k.3 (), Daily Weight in k.9 (), Daily Weight in k.9 ()    Nutritionally Pertinent MEDICATIONS  (STANDING):  amLODIPine   Tablet  ascorbic acid  calcium carbonate    500 mG (Tums) Chewable  dextrose 50% Injectable  dextrose 50% Injectable  glucagon  Injectable  insulin glargine Injectable (LANTUS)  insulin lispro (ADMELOG) corrective regimen sliding scale  insulin regular  human recombinant  insulin regular  human recombinant  insulin regular  human recombinant  levothyroxine  multivitamin/minerals/iron Oral Solution (CENTRUM)  pantoprazole   Suspension  predniSONE   Tablet  simethicone    Pertinent Labs:   A1C with Estimated Average Glucose Result: 7.5 % (10-28-23 @ 07:18)    Finger Sticks:  POCT Blood Glucose.: 211 mg/dL ( @ 11:59)  POCT Blood Glucose.: 201 mg/dL ( @ 08:46)  POCT Blood Glucose.: 182 mg/dL ( @ 05:41)  POCT Blood Glucose.: 174 mg/dL ( @ 23:35)  POCT Blood Glucose.: 212 mg/dL ( @ 17:24)    Skin per nursing documentation: sacrum stage IV.   Edema per nursing documentation: none noted    Estimated Needs using IBW + 10% considering increased needs and edema (48kg)  27-32kcal/kg 1296-1536kcal/day  1.3-1.6g/kg 62-77g protein/day  defer fluid needs to team    Previous Nutrition Diagnosis:  Increased Nutrient Needs  Nutrition Diagnosis is: [x] ongoing  [] resolved [] not applicable     Nutrition Care Plan:  [x] In Progress  [] Achieved  [] Not applicable    New Nutrition Diagnosis: [x] Not applicable    Nutrition Interventions:     Education Provided   [] Yes:  [x] No:     Recommendations:      -Can continue [employer] Glucose Support at 60ml/hr x 20 hours to provide a total volume of 1200ml, 1440kcal (30kcal/kg), 77g protein (1.6g/kg). 20 hour feed will allow time for synthroid administration, noted to be ordered to be given at 05:00.   -Alfred will provide additional glutamine and arginine amino acids with collagen for wound healing. continue.   -Continue Vitamin C and Multivitamin to aid in wound healing.   -Defer free water flush to team    Monitoring and Evaluation:   Continue to monitor nutritional intake, tolerance to diet prescription, weights, labs, skin integrity    RD remains available upon request and will follow up per protocol  Rufina Morris, MS, RD, CDN / Teams Nutrition Follow Up Note  Patient seen for: follow up    Source: [] Patient       [x] Medical Record        [x] Nursing        [] Family at bedside       [] Other:    -If unable to interview patient: [x] Trach/Vent/BiPAP  [] Disoriented/confused/inappropriate to interview    Diet Order:   Diet, NPO with Tube Feed:   Tube Feeding Modality: Gastrostomy  Casie Frazier glucose support 1.2  Total Volume for 24 Hours (mL): 1200  Continuous  Starting Tube Feed Rate {mL per Hour}: 60  Increase Tube Feed Rate by (mL): 0  Until Goal Tube Feed Rate (mL per Hour): 60  Tube Feed Duration (in Hours): 20  Tube Feed Start Time: 06:00  Tube Feed Stop Time: 02:00  Free Water Flush  Pump   Rate (mL per Hour): 10   Frequency: Every 2 Hours  Alfred(7 Gm Arginine/7 Gm Glut/1.2 Gm HMB     Qty per Day:  2 (23)    EN order providing if 100% provision: 1440kcal and 77g protein.     - Is current order appropriate/adequate? [x] Yes  []  No:     Nutrition related concerns:  -Free water flush per team, above  -Hgb A1c 7.5%. POCT Blood glucose being monitored. on insulin regimen to maintain glycemic control. Formula changed from Peptide to glucose support to aid in glycemic control.   -oral synthroid ordered  -Pt vegan, but per previous discussion with daughter, Alfred is okay. See nutrition note .     GI:  Last BM .  Bowel Regimen? [] Yes   [x] No    Weights:   Dosing weight 54.6kg  10/29:  reports pt UBW 90 pounds prior to illness (~41kg).   Daily Weight in k.3 () 54.4 (), Daily Weight in k.3 (), Daily Weight in k.9 (), Daily Weight in k.9 ()    Nutritionally Pertinent MEDICATIONS  (STANDING):  amLODIPine   Tablet  ascorbic acid  calcium carbonate    500 mG (Tums) Chewable  dextrose 50% Injectable  dextrose 50% Injectable  glucagon  Injectable  insulin glargine Injectable (LANTUS)  insulin lispro (ADMELOG) corrective regimen sliding scale  insulin regular  human recombinant  insulin regular  human recombinant  insulin regular  human recombinant  levothyroxine  multivitamin/minerals/iron Oral Solution (CENTRUM)  pantoprazole   Suspension  predniSONE   Tablet  simethicone    Pertinent Labs:   A1C with Estimated Average Glucose Result: 7.5 % (10-28-23 @ 07:18)    Finger Sticks:  POCT Blood Glucose.: 211 mg/dL ( @ 11:59)  POCT Blood Glucose.: 201 mg/dL ( @ 08:46)  POCT Blood Glucose.: 182 mg/dL ( @ 05:41)  POCT Blood Glucose.: 174 mg/dL ( @ 23:35)  POCT Blood Glucose.: 212 mg/dL ( @ 17:24)    Skin per nursing documentation: sacrum stage IV.   Edema per nursing documentation: none noted    Estimated Needs using IBW + 10% considering increased needs and edema (48kg)  27-32kcal/kg 1296-1536kcal/day  1.3-1.6g/kg 62-77g protein/day  defer fluid needs to team    Previous Nutrition Diagnosis:  Increased Nutrient Needs  Nutrition Diagnosis is: [x] ongoing  [] resolved [] not applicable     Nutrition Care Plan:  [x] In Progress  [] Achieved  [] Not applicable    New Nutrition Diagnosis: [x] Not applicable    Nutrition Interventions:     Education Provided   [] Yes:  [x] No:     Recommendations:      -Can continue [employer] Glucose Support at 60ml/hr x 20 hours to provide a total volume of 1200ml, 1440kcal (30kcal/kg), 77g protein (1.6g/kg). 20 hour feed will allow time for synthroid administration, noted to be ordered to be given at 05:00.   -Alfred will provide additional glutamine and arginine amino acids with collagen for wound healing. continue.   -Continue Vitamin C and Multivitamin to aid in wound healing.   -Defer free water flush to team    Monitoring and Evaluation:   Continue to monitor nutritional intake, tolerance to diet prescription, weights, labs, skin integrity    RD remains available upon request and will follow up per protocol  Rufina Morris, MS, RD, CDN / Teams

## 2023-12-26 NOTE — PROGRESS NOTE ADULT - ASSESSMENT
74 y/o F with PMHx of T2DM, HTN, HLD, anemia, hypothyroidism, RA, fibromyalgia, remote cerebral aneurysm repair, acute hypercapnic respiratory failure now with tracheostomy, PEG tube, and bedbound (trach change 8/18, PEG change 8/14), sacral pressure ulcer, BIBEMS from home for 6 day hx of bleeding from the tracheostomy and PEG tube with associated abdominal pain, recent history of auditory and visual hallucinations, and with chronic sacral decubitus ulcer. Exam notable for mild abdominal distention with LLQ and RLQ tenderness, tracheostomy in place with no obvious bleeding, PEG tube with scant amount of dried blood, at baseline neurologic status. Imaging showing no active bleeding around tracheostomy site, however was notable for mild thickening along PEG tube tract, sacral ulcer extending to the coccyx suggestive of possible osteomyelitis, and bibasilar patchy lung opacities with volume loss. Patient admitted for respiratory support, wound care of tracheostomy and PEG, and management of sacral osteomyelitis. No further Trach and peg site bleeding noted since admission.  Pelvic MRI imaging also suggestive of Acute OM. Patient placed on long-term antibiotics with Infectious disease guidance.  Additionally treated with a 7d course of Cefepime due to PSA and Serratia tracheitis on 11/8-->11/15 and then resumed cipro/keflex.  Hospital course c/b Delirium for which Seroquel was added and home Lexapro continued. Tracheostomy changed to #9 Cuffed [place] by ENT 11/3.  Despite trach change patient remained with persistent air leak.  On 11/19, the trach was again changed due to leak and loss of volumes on vent.  Trach was changed to #8 distal cuffed Shiley.  Patient seen by Dermatology for back lesions.  One diagnosed as a seborrheic keratitis and the other a dermatofibroma.  Intermittent abdominal pain throughout hospital course, at times relieved with gas/BM, w/u negative, seen by GI - no recs.  12/10 pt completed cipro and keflex for osteo treatment.  12/13 moderate amounts of secretions w/ blood - tranexamic acid neb given with notable improvement.  12/18 with blood tinged secretion again - TXA 250mg neb x2 doses.  12/19 spiked fever to 102 - pancultured, results still pending.    12/25:  No acute events.  Afebrile off antibiotics, blood and urine cultures from 12/19 remain without growth. 72 y/o F with PMHx of T2DM, HTN, HLD, anemia, hypothyroidism, RA, fibromyalgia, remote cerebral aneurysm repair, acute hypercapnic respiratory failure now with tracheostomy, PEG tube, and bedbound (trach change 8/18, PEG change 8/14), sacral pressure ulcer, BIBEMS from home for 6 day hx of bleeding from the tracheostomy and PEG tube with associated abdominal pain, recent history of auditory and visual hallucinations, and with chronic sacral decubitus ulcer. Exam notable for mild abdominal distention with LLQ and RLQ tenderness, tracheostomy in place with no obvious bleeding, PEG tube with scant amount of dried blood, at baseline neurologic status. Imaging showing no active bleeding around tracheostomy site, however was notable for mild thickening along PEG tube tract, sacral ulcer extending to the coccyx suggestive of possible osteomyelitis, and bibasilar patchy lung opacities with volume loss. Patient admitted for respiratory support, wound care of tracheostomy and PEG, and management of sacral osteomyelitis. No further Trach and peg site bleeding noted since admission.  Pelvic MRI imaging also suggestive of Acute OM. Patient placed on long-term antibiotics with Infectious disease guidance.  Additionally treated with a 7d course of Cefepime due to PSA and Serratia tracheitis on 11/8-->11/15 and then resumed cipro/keflex.  Hospital course c/b Delirium for which Seroquel was added and home Lexapro continued. Tracheostomy changed to #9 Cuffed [place] by ENT 11/3.  Despite trach change patient remained with persistent air leak.  On 11/19, the trach was again changed due to leak and loss of volumes on vent.  Trach was changed to #8 distal cuffed Shiley.  Patient seen by Dermatology for back lesions.  One diagnosed as a seborrheic keratitis and the other a dermatofibroma.  Intermittent abdominal pain throughout hospital course, at times relieved with gas/BM, w/u negative, seen by GI - no recs.  12/10 pt completed cipro and keflex for osteo treatment.  12/13 moderate amounts of secretions w/ blood - tranexamic acid neb given with notable improvement.  12/18 with blood tinged secretion again - TXA 250mg neb x2 doses.  12/19 spiked fever to 102 - pancultured, results still pending.    12/25:  No acute events.  Afebrile off antibiotics, blood and urine cultures from 12/19 remain without growth. 72 y/o F with PMHx of T2DM, HTN, HLD, anemia, hypothyroidism, RA, fibromyalgia, remote cerebral aneurysm repair, acute hypercapnic respiratory failure now with tracheostomy, PEG tube, and bedbound (trach change 8/18, PEG change 8/14), sacral pressure ulcer, BIBEMS from home for 6 day hx of bleeding from the tracheostomy and PEG tube with associated abdominal pain, recent history of auditory and visual hallucinations, and with chronic sacral decubitus ulcer. Exam notable for mild abdominal distention with LLQ and RLQ tenderness, tracheostomy in place with no obvious bleeding, PEG tube with scant amount of dried blood, at baseline neurologic status. Imaging showing no active bleeding around tracheostomy site, however was notable for mild thickening along PEG tube tract, sacral ulcer extending to the coccyx suggestive of possible osteomyelitis, and bibasilar patchy lung opacities with volume loss. Patient admitted for respiratory support, wound care of tracheostomy and PEG, and management of sacral osteomyelitis. No further Trach and peg site bleeding noted since admission.  Pelvic MRI imaging also suggestive of Acute OM. Patient placed on long-term antibiotics with Infectious disease guidance.  Additionally treated with a 7d course of Cefepime due to PSA and Serratia tracheitis on 11/8-->11/15 and then resumed cipro/keflex.  Hospital course c/b Delirium for which Seroquel was added and home Lexapro continued. Tracheostomy changed to #9 Cuffed [place] by ENT 11/3.  Despite trach change patient remained with persistent air leak.  On 11/19, the trach was again changed due to leak and loss of volumes on vent.  Trach was changed to #8 distal cuffed Shiley.  Patient seen by Dermatology for back lesions.  One diagnosed as a seborrheic keratitis and the other a dermatofibroma.  Intermittent abdominal pain throughout hospital course, at times relieved with gas/BM, w/u negative, seen by GI - no recs.  12/10 pt completed cipro and keflex for osteo treatment.  12/13 moderate amounts of secretions w/ blood - tranexamic acid neb given with notable improvement.  12/18 with blood tinged secretion again - TXA 250mg neb x2 doses.  12/19 spiked fever to 102 - pancultured, negative w/u.    12/26:  Pt remains medically stable.  Discharge notice given to daughter Marysol.

## 2023-12-26 NOTE — PROGRESS NOTE ADULT - PROBLEM SELECTOR PLAN 7
- Continue home Prednisone regimen 5 mg daily   *fibromyalgia  - continue home Gabapentin 100mg daily  - continue home Fentanyl 25mcg Q72H patches  - c/w Lidocaine patch   - Oxycodone 2.5mg q6 hrs PRN (in addition to Tylenol PRN) Yes

## 2023-12-26 NOTE — PROGRESS NOTE ADULT - NS ATTEND AMEND GEN_ALL_CORE FT
73F with a h/o DM, HTN, HLD, anemia, hypothyroidism, RA, fibromyalgia, remote cerebral aneurysm with repair, hypercapnic respiratory failure s/p tracheostomy/PEG, bedbound, sacral pressure ulcer. Admitted w/ bleeding from tracheostomy and PEG w/ associated abdominal pain, recent hx of auditory/visual hallucinations.   Imaging showed mild thickening along PEG tube tract and sacral ulcer extending to coccyx suggestive of acute osteomyelitis.      # Osteomyelitis  # Chronic hypoxemic RF  # oropharyngeal dysphagia  # acute on chronic pain  # Trach/Peg bleeding  # Tracheitis with Pseudomonas and serratia  # Hx of hallucination, anxiety     #Neuro - awake, alert, and interactive. moving all extremities, mouthing words. continue current medications  #Cardiovascular - hemodynamically stable  #Pulmonary - chronic hypoxemic respiratory failure, has 8XLT trach, on home vent, pressure support trials daily. Tolerated TC only for 5 mins previously - placed back on ventilator due to respiratory distress per notes.  No further hemoptysis after TXA nebs, Minimize suctioning as needed  #Gastro - oropharyngeal dysphagia with PEG tube in place, tolerating feeds, nutrition follow up appreciated   Appreciate GI eval of PEG site  #ID - sacral osteo on MRI - wound care follow-up appreciated, c/w offloading and local wound care  -The patient has had this wound since a hospitalization in December 2022 and per daughter does not have chronic or multiple wounds but only this single wound.   - s/p 6 week course of Cipro and Keflex as per ID - completed 12/10/23  - afebrile off abx today   #Hem/Onc - prior cytopenias in setting of antibiotics per patient's daughter, stable Hb today   - Hem/Onc follow-up noted - suspect an anemia of chronic disease  #Pain - continue current pain control - in setting of fibromyalgia and pain from wound  - Voltaren topical, lidocaine patches and low dose Oxycodone as needed  #Derm - previously seen by derm for skin lesions - mid back with irritated seborrheic keratosis - outpatient follow-up  #Endo - DM2 - continue insulin and adjust as needed for goal FS < 180  - Endocrine follow-up appreciated   - Continue Synthroid - TSH WNL  #DVT proph - SCDs  - Prophylactic AC stopped after concern for prior bleeding.  Discharge planning.

## 2023-12-26 NOTE — CHART NOTE - NSCHARTNOTEFT_GEN_A_CORE
Age: 73y    Gender: Female    POCT Blood Glucose:  211 mg/dL (12-26-23 @ 11:59)  201 mg/dL (12-26-23 @ 08:46)  182 mg/dL (12-26-23 @ 05:41)  174 mg/dL (12-25-23 @ 23:35)  212 mg/dL (12-25-23 @ 17:24)      eMAR:  insulin glargine Injectable (LANTUS)   18 Unit(s) SubCutaneous (12-26-23 @ 08:51)    insulin lispro (ADMELOG) corrective regimen sliding scale   2 Unit(s) SubCutaneous (12-26-23 @ 12:07)   1 Unit(s) SubCutaneous (12-26-23 @ 05:48)   1 Unit(s) SubCutaneous (12-25-23 @ 23:38)   2 Unit(s) SubCutaneous (12-25-23 @ 17:45)    insulin regular  human recombinant   9 Unit(s) SubCutaneous (12-25-23 @ 17:44)    insulin regular  human recombinant   28 Unit(s) SubCutaneous (12-26-23 @ 05:49)    insulin regular  human recombinant   15 Unit(s) SubCutaneous (12-26-23 @ 12:08)    levothyroxine   125 MICROGram(s) Oral (12-26-23 @ 05:49)    predniSONE   Tablet   5 milliGRAM(s) Oral (12-26-23 @ 05:46)     POC glucose, insulin requirements, lab values reviewed.    72 y/o F w/h/o T2DM with unknown control due to anemia of chronic disease skewing A1C levels. On Levemir and Humalog insulin PTA. Unknown DM complications. Also h/o HTN, HLD, anemia, hypothyroidism, RA, fibromyalgia, remote cerebral aneurysm repair, acute hypercapnic respiratory failure now with tracheostomy, PEG tube, and bedbound (trach change 8/18, PEG change 8/14), sacral pressure ulcer, BIBEMS from home for 6 day hx of bleeding from the tracheostomy and PEG tube with associated abdominal pain> now resolved. Endocrinology consulted for hyperglycemia. BG Goal 100-180mg/dl        Tolerating TFs at goal 60 ml/hr from 6am to 2am. Last 24 hour BGs variable 100s-218. 12 noon BG in 200s for 3 days.   Diet, NPO with Tube Feed:   Tube Feeding Modality: Gastrostomy  [employer] glucose support 1.2  Total Volume for 24 Hours (mL): 1200  Continuous  Starting Tube Feed Rate {mL per Hour}: 60  Increase Tube Feed Rate by (mL): 0  Until Goal Tube Feed Rate (mL per Hour): 60  Tube Feed Duration (in Hours): 20  Tube Feed Start Time: 06:00  Tube Feed Stop Time: 02:00  Free Water Flush  Pump   Rate (mL per Hour): 10   Frequency: Every 2 Hours  Alfred(7 Gm Arginine/7 Gm Glut/1.2 Gm HMB     Qty per Day:  2 (11-29-23 @ 14:12) [Active]         # Type 2 diabetes mellitus with hyperglycemia, with long-term current use of insulin.   ·  Plan: - FS BG monitoring [id number] while on TFs/NPO   - Continue lantus 18 units sc qAM   - Noon BG above goal, Adjust  Regular insulin 30 units sc at 6am, 15 units sc at 12 noon and 9 units sc at 6pm. PLEASE DO NOT HOLD IF PATIENT IS ON TUBE FEEDS (hold only if TF are stopped). Can decrease/adjust dose if there is a concern for hypoglycemia.   - C/w low dose admelog correction scale every 6 hours  - please inform endocrine team if there are any changes in tube feeding( formula or rate)   - Will follow and adjust insulin as needed Age: 73y    Gender: Female    POCT Blood Glucose:  211 mg/dL (12-26-23 @ 11:59)  201 mg/dL (12-26-23 @ 08:46)  182 mg/dL (12-26-23 @ 05:41)  174 mg/dL (12-25-23 @ 23:35)  212 mg/dL (12-25-23 @ 17:24)      eMAR:  insulin glargine Injectable (LANTUS)   18 Unit(s) SubCutaneous (12-26-23 @ 08:51)    insulin lispro (ADMELOG) corrective regimen sliding scale   2 Unit(s) SubCutaneous (12-26-23 @ 12:07)   1 Unit(s) SubCutaneous (12-26-23 @ 05:48)   1 Unit(s) SubCutaneous (12-25-23 @ 23:38)   2 Unit(s) SubCutaneous (12-25-23 @ 17:45)    insulin regular  human recombinant   9 Unit(s) SubCutaneous (12-25-23 @ 17:44)    insulin regular  human recombinant   28 Unit(s) SubCutaneous (12-26-23 @ 05:49)    insulin regular  human recombinant   15 Unit(s) SubCutaneous (12-26-23 @ 12:08)    levothyroxine   125 MICROGram(s) Oral (12-26-23 @ 05:49)    predniSONE   Tablet   5 milliGRAM(s) Oral (12-26-23 @ 05:46)     POC glucose, insulin requirements, lab values reviewed.    72 y/o F w/h/o T2DM with unknown control due to anemia of chronic disease skewing A1C levels. On Levemir and Humalog insulin PTA. Unknown DM complications. Also h/o HTN, HLD, anemia, hypothyroidism, RA, fibromyalgia, remote cerebral aneurysm repair, acute hypercapnic respiratory failure now with tracheostomy, PEG tube, and bedbound (trach change 8/18, PEG change 8/14), sacral pressure ulcer, BIBEMS from home for 6 day hx of bleeding from the tracheostomy and PEG tube with associated abdominal pain> now resolved. Endocrinology consulted for hyperglycemia. BG Goal 100-180mg/dl        Tolerating TFs at goal 60 ml/hr from 6am to 2am. Last 24 hour BGs variable 100s-218. 12 noon BG in 200s for 3 days.   Diet, NPO with Tube Feed:   Tube Feeding Modality: Gastrostomy  [employer] glucose support 1.2  Total Volume for 24 Hours (mL): 1200  Continuous  Starting Tube Feed Rate {mL per Hour}: 60  Increase Tube Feed Rate by (mL): 0  Until Goal Tube Feed Rate (mL per Hour): 60  Tube Feed Duration (in Hours): 20  Tube Feed Start Time: 06:00  Tube Feed Stop Time: 02:00  Free Water Flush  Pump   Rate (mL per Hour): 10   Frequency: Every 2 Hours  Alfred(7 Gm Arginine/7 Gm Glut/1.2 Gm HMB     Qty per Day:  2 (11-29-23 @ 14:12) [Active]         # Type 2 diabetes mellitus with hyperglycemia, with long-term current use of insulin.   ·  Plan: - FS BG monitoring [id number] while on TFs/NPO   - Continue lantus 18 units sc qAM   - Noon BG above goal, Adjust  Regular insulin 30 units sc at 6am, 15 units sc at 12 noon and 9 units sc at 6pm. PLEASE DO NOT HOLD IF PATIENT IS ON TUBE FEEDS (hold only if TF are stopped). Can decrease/adjust dose if there is a concern for hypoglycemia.   - C/w low dose admelog correction scale every 6 hours  - please inform endocrine team if there are any changes in tube feeding( formula or rate)   - Will follow and adjust insulin as needed

## 2023-12-26 NOTE — PROGRESS NOTE ADULT - PROBLEM SELECTOR PLAN 8
- s/p Home Levemir 14 units in morning held on admission as noted w/ hypoglycemia   - Enteral feed changed to [employer] diabetic formula; glucose numbers stabilizing  - adjustments made throughout admission per endocrine recs - s/p Home Levemir 14 units in morning held on admission as noted w/ hypoglycemia   - Enteral feed changed to [employer] diabetic formula; glucose numbers stabilizing  - adjustments made throughout admission per endocrine recs

## 2023-12-27 LAB
ANION GAP SERPL CALC-SCNC: 11 MMOL/L — SIGNIFICANT CHANGE UP (ref 5–17)
ANION GAP SERPL CALC-SCNC: 11 MMOL/L — SIGNIFICANT CHANGE UP (ref 5–17)
BUN SERPL-MCNC: 34 MG/DL — HIGH (ref 7–23)
BUN SERPL-MCNC: 34 MG/DL — HIGH (ref 7–23)
CALCIUM SERPL-MCNC: 10.8 MG/DL — HIGH (ref 8.4–10.5)
CALCIUM SERPL-MCNC: 10.8 MG/DL — HIGH (ref 8.4–10.5)
CHLORIDE SERPL-SCNC: 102 MMOL/L — SIGNIFICANT CHANGE UP (ref 96–108)
CHLORIDE SERPL-SCNC: 102 MMOL/L — SIGNIFICANT CHANGE UP (ref 96–108)
CO2 SERPL-SCNC: 33 MMOL/L — HIGH (ref 22–31)
CO2 SERPL-SCNC: 33 MMOL/L — HIGH (ref 22–31)
CREAT SERPL-MCNC: <0.3 MG/DL — LOW (ref 0.5–1.3)
CREAT SERPL-MCNC: <0.3 MG/DL — LOW (ref 0.5–1.3)
EGFR: 113 ML/MIN/1.73M2 — SIGNIFICANT CHANGE UP
EGFR: 113 ML/MIN/1.73M2 — SIGNIFICANT CHANGE UP
GLUCOSE BLDC GLUCOMTR-MCNC: 141 MG/DL — HIGH (ref 70–99)
GLUCOSE BLDC GLUCOMTR-MCNC: 141 MG/DL — HIGH (ref 70–99)
GLUCOSE BLDC GLUCOMTR-MCNC: 200 MG/DL — HIGH (ref 70–99)
GLUCOSE BLDC GLUCOMTR-MCNC: 200 MG/DL — HIGH (ref 70–99)
GLUCOSE BLDC GLUCOMTR-MCNC: 207 MG/DL — HIGH (ref 70–99)
GLUCOSE BLDC GLUCOMTR-MCNC: 207 MG/DL — HIGH (ref 70–99)
GLUCOSE BLDC GLUCOMTR-MCNC: 221 MG/DL — HIGH (ref 70–99)
GLUCOSE BLDC GLUCOMTR-MCNC: 221 MG/DL — HIGH (ref 70–99)
GLUCOSE SERPL-MCNC: 196 MG/DL — HIGH (ref 70–99)
GLUCOSE SERPL-MCNC: 196 MG/DL — HIGH (ref 70–99)
HCT VFR BLD CALC: 30.8 % — LOW (ref 34.5–45)
HCT VFR BLD CALC: 30.8 % — LOW (ref 34.5–45)
HGB BLD-MCNC: 8.6 G/DL — LOW (ref 11.5–15.5)
HGB BLD-MCNC: 8.6 G/DL — LOW (ref 11.5–15.5)
MAGNESIUM SERPL-MCNC: 1.9 MG/DL — SIGNIFICANT CHANGE UP (ref 1.6–2.6)
MAGNESIUM SERPL-MCNC: 1.9 MG/DL — SIGNIFICANT CHANGE UP (ref 1.6–2.6)
MCHC RBC-ENTMCNC: 26.7 PG — LOW (ref 27–34)
MCHC RBC-ENTMCNC: 26.7 PG — LOW (ref 27–34)
MCHC RBC-ENTMCNC: 27.9 GM/DL — LOW (ref 32–36)
MCHC RBC-ENTMCNC: 27.9 GM/DL — LOW (ref 32–36)
MCV RBC AUTO: 95.7 FL — SIGNIFICANT CHANGE UP (ref 80–100)
MCV RBC AUTO: 95.7 FL — SIGNIFICANT CHANGE UP (ref 80–100)
NRBC # BLD: 0 /100 WBCS — SIGNIFICANT CHANGE UP (ref 0–0)
NRBC # BLD: 0 /100 WBCS — SIGNIFICANT CHANGE UP (ref 0–0)
PHOSPHATE SERPL-MCNC: 4.6 MG/DL — HIGH (ref 2.5–4.5)
PHOSPHATE SERPL-MCNC: 4.6 MG/DL — HIGH (ref 2.5–4.5)
PLATELET # BLD AUTO: 132 K/UL — LOW (ref 150–400)
PLATELET # BLD AUTO: 132 K/UL — LOW (ref 150–400)
POTASSIUM SERPL-MCNC: 3.5 MMOL/L — SIGNIFICANT CHANGE UP (ref 3.5–5.3)
POTASSIUM SERPL-MCNC: 3.5 MMOL/L — SIGNIFICANT CHANGE UP (ref 3.5–5.3)
POTASSIUM SERPL-SCNC: 3.5 MMOL/L — SIGNIFICANT CHANGE UP (ref 3.5–5.3)
POTASSIUM SERPL-SCNC: 3.5 MMOL/L — SIGNIFICANT CHANGE UP (ref 3.5–5.3)
RBC # BLD: 3.22 M/UL — LOW (ref 3.8–5.2)
RBC # BLD: 3.22 M/UL — LOW (ref 3.8–5.2)
RBC # FLD: 17.2 % — HIGH (ref 10.3–14.5)
RBC # FLD: 17.2 % — HIGH (ref 10.3–14.5)
SODIUM SERPL-SCNC: 146 MMOL/L — HIGH (ref 135–145)
SODIUM SERPL-SCNC: 146 MMOL/L — HIGH (ref 135–145)
WBC # BLD: 10.15 K/UL — SIGNIFICANT CHANGE UP (ref 3.8–10.5)
WBC # BLD: 10.15 K/UL — SIGNIFICANT CHANGE UP (ref 3.8–10.5)
WBC # FLD AUTO: 10.15 K/UL — SIGNIFICANT CHANGE UP (ref 3.8–10.5)
WBC # FLD AUTO: 10.15 K/UL — SIGNIFICANT CHANGE UP (ref 3.8–10.5)

## 2023-12-27 PROCEDURE — 99232 SBSQ HOSP IP/OBS MODERATE 35: CPT | Mod: FS

## 2023-12-27 PROCEDURE — 99232 SBSQ HOSP IP/OBS MODERATE 35: CPT

## 2023-12-27 RX ORDER — INSULIN GLARGINE 100 [IU]/ML
19 INJECTION, SOLUTION SUBCUTANEOUS EVERY MORNING
Refills: 0 | Status: DISCONTINUED | OUTPATIENT
Start: 2023-12-28 | End: 2024-01-10

## 2023-12-27 RX ORDER — OXYCODONE HYDROCHLORIDE 5 MG/1
2.5 TABLET ORAL EVERY 6 HOURS
Refills: 0 | Status: DISCONTINUED | OUTPATIENT
Start: 2023-12-27 | End: 2023-12-29

## 2023-12-27 RX ORDER — POTASSIUM CHLORIDE 20 MEQ
20 PACKET (EA) ORAL DAILY
Refills: 0 | Status: DISCONTINUED | OUTPATIENT
Start: 2023-12-27 | End: 2023-12-28

## 2023-12-27 RX ADMIN — PANTOPRAZOLE SODIUM 40 MILLIGRAM(S): 20 TABLET, DELAYED RELEASE ORAL at 09:03

## 2023-12-27 RX ADMIN — Medication 15 MILLILITER(S): at 12:02

## 2023-12-27 RX ADMIN — INSULIN GLARGINE 18 UNIT(S): 100 INJECTION, SOLUTION SUBCUTANEOUS at 09:02

## 2023-12-27 RX ADMIN — Medication 1 APPLICATION(S): at 17:47

## 2023-12-27 RX ADMIN — LIDOCAINE 5 MILLILITER(S): 4 CREAM TOPICAL at 19:13

## 2023-12-27 RX ADMIN — Medication 3 MILLILITER(S): at 01:04

## 2023-12-27 RX ADMIN — Medication 1 APPLICATION(S): at 23:15

## 2023-12-27 RX ADMIN — AMLODIPINE BESYLATE 10 MILLIGRAM(S): 2.5 TABLET ORAL at 05:05

## 2023-12-27 RX ADMIN — INSULIN HUMAN 30 UNIT(S): 100 INJECTION, SOLUTION SUBCUTANEOUS at 06:19

## 2023-12-27 RX ADMIN — OXYCODONE HYDROCHLORIDE 2.5 MILLIGRAM(S): 5 TABLET ORAL at 09:45

## 2023-12-27 RX ADMIN — Medication 2 DROP(S): at 05:06

## 2023-12-27 RX ADMIN — CHLORHEXIDINE GLUCONATE 15 MILLILITER(S): 213 SOLUTION TOPICAL at 17:49

## 2023-12-27 RX ADMIN — NYSTATIN CREAM 1 APPLICATION(S): 100000 CREAM TOPICAL at 14:51

## 2023-12-27 RX ADMIN — SIMETHICONE 80 MILLIGRAM(S): 80 TABLET, CHEWABLE ORAL at 12:02

## 2023-12-27 RX ADMIN — GABAPENTIN 100 MILLIGRAM(S): 400 CAPSULE ORAL at 21:15

## 2023-12-27 RX ADMIN — LIDOCAINE 1 PATCH: 4 CREAM TOPICAL at 12:04

## 2023-12-27 RX ADMIN — Medication 125 MICROGRAM(S): at 05:06

## 2023-12-27 RX ADMIN — ZINC OXIDE 1 APPLICATION(S): 200 OINTMENT TOPICAL at 12:07

## 2023-12-27 RX ADMIN — INSULIN HUMAN 15 UNIT(S): 100 INJECTION, SOLUTION SUBCUTANEOUS at 12:44

## 2023-12-27 RX ADMIN — Medication 1 APPLICATION(S): at 05:06

## 2023-12-27 RX ADMIN — LIDOCAINE 1 PATCH: 4 CREAM TOPICAL at 19:47

## 2023-12-27 RX ADMIN — Medication 5 MILLIGRAM(S): at 21:14

## 2023-12-27 RX ADMIN — OXYCODONE HYDROCHLORIDE 2.5 MILLIGRAM(S): 5 TABLET ORAL at 09:02

## 2023-12-27 RX ADMIN — Medication 2 DROP(S): at 18:26

## 2023-12-27 RX ADMIN — Medication 2 DROP(S): at 23:15

## 2023-12-27 RX ADMIN — Medication 1 APPLICATION(S): at 12:08

## 2023-12-27 RX ADMIN — Medication 3 MILLILITER(S): at 13:16

## 2023-12-27 RX ADMIN — Medication 2: at 12:43

## 2023-12-27 RX ADMIN — NYSTATIN CREAM 1 APPLICATION(S): 100000 CREAM TOPICAL at 21:14

## 2023-12-27 RX ADMIN — Medication 1: at 06:20

## 2023-12-27 RX ADMIN — Medication 20 MILLIEQUIVALENT(S): at 12:02

## 2023-12-27 RX ADMIN — LIDOCAINE 5 MILLILITER(S): 4 CREAM TOPICAL at 05:05

## 2023-12-27 RX ADMIN — Medication 500 MILLIGRAM(S): at 12:03

## 2023-12-27 RX ADMIN — ESCITALOPRAM OXALATE 10 MILLIGRAM(S): 10 TABLET, FILM COATED ORAL at 09:01

## 2023-12-27 RX ADMIN — CHLORHEXIDINE GLUCONATE 1 APPLICATION(S): 213 SOLUTION TOPICAL at 06:49

## 2023-12-27 RX ADMIN — Medication 5 MILLIGRAM(S): at 05:06

## 2023-12-27 RX ADMIN — SIMETHICONE 80 MILLIGRAM(S): 80 TABLET, CHEWABLE ORAL at 17:47

## 2023-12-27 RX ADMIN — Medication 3 MILLILITER(S): at 06:23

## 2023-12-27 RX ADMIN — Medication 1 TABLET(S): at 17:47

## 2023-12-27 RX ADMIN — Medication 1 TABLET(S): at 05:05

## 2023-12-27 RX ADMIN — CHLORHEXIDINE GLUCONATE 15 MILLILITER(S): 213 SOLUTION TOPICAL at 05:07

## 2023-12-27 RX ADMIN — QUETIAPINE FUMARATE 12.5 MILLIGRAM(S): 200 TABLET, FILM COATED ORAL at 17:47

## 2023-12-27 RX ADMIN — SIMETHICONE 80 MILLIGRAM(S): 80 TABLET, CHEWABLE ORAL at 23:15

## 2023-12-27 RX ADMIN — Medication 1 MILLIGRAM(S): at 21:14

## 2023-12-27 RX ADMIN — PHENYLEPHRINE-SHARK LIVER OIL-MINERAL OIL-PETROLATUM RECTAL OINTMENT 1 APPLICATION(S): at 11:00

## 2023-12-27 RX ADMIN — LIDOCAINE 1 PATCH: 4 CREAM TOPICAL at 12:05

## 2023-12-27 RX ADMIN — SIMETHICONE 80 MILLIGRAM(S): 80 TABLET, CHEWABLE ORAL at 05:06

## 2023-12-27 RX ADMIN — Medication 2 DROP(S): at 12:06

## 2023-12-27 RX ADMIN — Medication 3 MILLILITER(S): at 17:44

## 2023-12-27 RX ADMIN — LIDOCAINE 1 PATCH: 4 CREAM TOPICAL at 19:46

## 2023-12-27 RX ADMIN — INSULIN HUMAN 9 UNIT(S): 100 INJECTION, SOLUTION SUBCUTANEOUS at 17:48

## 2023-12-27 RX ADMIN — NYSTATIN CREAM 1 APPLICATION(S): 100000 CREAM TOPICAL at 05:06

## 2023-12-27 NOTE — PROGRESS NOTE ADULT - TIME BILLING
review of the medical record, medical decision making as above, discussion w/ interdisciplinary team

## 2023-12-27 NOTE — PROGRESS NOTE ADULT - ASSESSMENT
74 y/o F with PMHx of T2DM, HTN, HLD, anemia, hypothyroidism, RA, fibromyalgia, remote cerebral aneurysm repair, acute hypercapnic respiratory failure now with tracheostomy, PEG tube, and bedbound (trach change 8/18, PEG change 8/14), sacral pressure ulcer, BIBEMS from home for 6 day hx of bleeding from the tracheostomy and PEG tube with associated abdominal pain, recent history of auditory and visual hallucinations, and with chronic sacral decubitus ulcer. Exam notable for mild abdominal distention with LLQ and RLQ tenderness, tracheostomy in place with no obvious bleeding, PEG tube with scant amount of dried blood, at baseline neurologic status. Imaging showing no active bleeding around tracheostomy site, however was notable for mild thickening along PEG tube tract, sacral ulcer extending to the coccyx suggestive of possible osteomyelitis, and bibasilar patchy lung opacities with volume loss. Patient admitted for respiratory support, wound care of tracheostomy and PEG, and management of sacral osteomyelitis. No further Trach and peg site bleeding noted since admission.  Pelvic MRI imaging also suggestive of Acute OM. Patient placed on long-term antibiotics with Infectious disease guidance.  Additionally treated with a 7d course of Cefepime due to PSA and Serratia tracheitis on 11/8-->11/15 and then resumed cipro/keflex.  Hospital course c/b Delirium for which Seroquel was added and home Lexapro continued. Tracheostomy changed to #9 Cuffed [place] by ENT 11/3.  Despite trach change patient remained with persistent air leak.  On 11/19, the trach was again changed due to leak and loss of volumes on vent.  Trach was changed to #8 distal cuffed Shiley.  Patient seen by Dermatology for back lesions.  One diagnosed as a seborrheic keratitis and the other a dermatofibroma.  Intermittent abdominal pain throughout hospital course, at times relieved with gas/BM, w/u negative, seen by GI - no recs.  12/10 pt completed cipro and keflex for osteo treatment.  12/13 moderate amounts of secretions w/ blood - tranexamic acid neb given with notable improvement.  12/18 with blood tinged secretion again - TXA 250mg neb x2 doses.  12/19 spiked fever to 102 - pancultured, negative w/u.    12/26:  Pt remains medically stable.  Discharge notice given to daughter Marysol.  72 y/o F with PMHx of T2DM, HTN, HLD, anemia, hypothyroidism, RA, fibromyalgia, remote cerebral aneurysm repair, acute hypercapnic respiratory failure now with tracheostomy, PEG tube, and bedbound (trach change 8/18, PEG change 8/14), sacral pressure ulcer, BIBEMS from home for 6 day hx of bleeding from the tracheostomy and PEG tube with associated abdominal pain, recent history of auditory and visual hallucinations, and with chronic sacral decubitus ulcer. Exam notable for mild abdominal distention with LLQ and RLQ tenderness, tracheostomy in place with no obvious bleeding, PEG tube with scant amount of dried blood, at baseline neurologic status. Imaging showing no active bleeding around tracheostomy site, however was notable for mild thickening along PEG tube tract, sacral ulcer extending to the coccyx suggestive of possible osteomyelitis, and bibasilar patchy lung opacities with volume loss. Patient admitted for respiratory support, wound care of tracheostomy and PEG, and management of sacral osteomyelitis. No further Trach and peg site bleeding noted since admission.  Pelvic MRI imaging also suggestive of Acute OM. Patient placed on long-term antibiotics with Infectious disease guidance.  Additionally treated with a 7d course of Cefepime due to PSA and Serratia tracheitis on 11/8-->11/15 and then resumed cipro/keflex.  Hospital course c/b Delirium for which Seroquel was added and home Lexapro continued. Tracheostomy changed to #9 Cuffed [place] by ENT 11/3.  Despite trach change patient remained with persistent air leak.  On 11/19, the trach was again changed due to leak and loss of volumes on vent.  Trach was changed to #8 distal cuffed Shiley.  Patient seen by Dermatology for back lesions.  One diagnosed as a seborrheic keratitis and the other a dermatofibroma.  Intermittent abdominal pain throughout hospital course, at times relieved with gas/BM, w/u negative, seen by GI - no recs.  12/10 pt completed cipro and keflex for osteo treatment.  12/13 moderate amounts of secretions w/ blood - tranexamic acid neb given with notable improvement.  12/18 with blood tinged secretion again - TXA 250mg neb x2 doses.  12/19 spiked fever to 102 - pancultured, negative w/u.    12/26:  Pt remains medically stable.  Discharge notice given to daughter Marysol.  72 y/o F with PMHx of T2DM, HTN, HLD, anemia, hypothyroidism, RA, fibromyalgia, remote cerebral aneurysm repair, acute hypercapnic respiratory failure now with tracheostomy, PEG tube, and bedbound (trach change 8/18, PEG change 8/14), sacral pressure ulcer, BIBEMS from home for 6 day hx of bleeding from the tracheostomy and PEG tube with associated abdominal pain, recent history of auditory and visual hallucinations, and with chronic sacral decubitus ulcer. Exam notable for mild abdominal distention with LLQ and RLQ tenderness, tracheostomy in place with no obvious bleeding, PEG tube with scant amount of dried blood, at baseline neurologic status. Imaging showing no active bleeding around tracheostomy site, however was notable for mild thickening along PEG tube tract, sacral ulcer extending to the coccyx suggestive of possible osteomyelitis, and bibasilar patchy lung opacities with volume loss. Patient admitted for respiratory support, wound care of tracheostomy and PEG, and management of sacral osteomyelitis. No further Trach and peg site bleeding noted since admission.  Pelvic MRI imaging also suggestive of Acute OM. Patient placed on long-term antibiotics with Infectious disease guidance.  Additionally treated with a 7d course of Cefepime due to PSA and Serratia tracheitis on 11/8-->11/15 and then resumed cipro/keflex.  Hospital course c/b Delirium for which Seroquel was added and home Lexapro continued. Tracheostomy changed to #9 Cuffed [place] by ENT 11/3.  Despite trach change patient remained with persistent air leak.  On 11/19, the trach was again changed due to leak and loss of volumes on vent.  Trach was changed to #8 distal cuffed Shiley.  Patient seen by Dermatology for back lesions.  One diagnosed as a seborrheic keratitis and the other a dermatofibroma.  Intermittent abdominal pain throughout hospital course, at times relieved with gas/BM, w/u negative, seen by GI - no recs.  12/10 pt completed cipro and keflex for osteo treatment.  12/13 moderate amounts of secretions w/ blood - tranexamic acid neb given with notable improvement.  12/18 with blood tinged secretion again - TXA 250mg neb x2 doses.  12/19 spiked fever to 102 - pancultured, negative w/u.    12/26:  Pt remains medically stable.  Discharge notice given to daughter Marysol.   12/17 failed ps 12/5   continue to wean as able- PMV in line eval by speech planned tomorrow

## 2023-12-27 NOTE — CHART NOTE - NSCHARTNOTEFT_GEN_A_CORE
Age: 73y    Gender: Female    POCT Blood Glucose:  207 mg/dL (12-27-23 @ 08:59)  200 mg/dL (12-27-23 @ 06:01)  126 mg/dL (12-26-23 @ 23:55)  211 mg/dL (12-26-23 @ 17:16)  211 mg/dL (12-26-23 @ 11:59)      eMAR:  insulin glargine Injectable (LANTUS)   18 Unit(s) SubCutaneous (12-27-23 @ 09:02)    insulin lispro (ADMELOG) corrective regimen sliding scale   1 Unit(s) SubCutaneous (12-27-23 @ 06:20)   2 Unit(s) SubCutaneous (12-26-23 @ 18:03)   2 Unit(s) SubCutaneous (12-26-23 @ 12:07)    insulin regular  human recombinant   15 Unit(s) SubCutaneous (12-26-23 @ 12:08)    insulin regular  human recombinant   30 Unit(s) SubCutaneous (12-27-23 @ 06:19)    insulin regular  human recombinant   9 Unit(s) SubCutaneous (12-26-23 @ 18:03)    levothyroxine   125 MICROGram(s) Oral (12-27-23 @ 05:06)    predniSONE   Tablet   5 milliGRAM(s) Oral (12-27-23 @ 05:06)    POC glucose, insulin requirements, lab values reviewed.    74 y/o F w/h/o T2DM with unknown control due to anemia of chronic disease skewing A1C levels. On Levemir and Humalog insulin PTA. Unknown DM complications. Also h/o HTN, HLD, anemia, hypothyroidism, RA, fibromyalgia, remote cerebral aneurysm repair, acute hypercapnic respiratory failure now with tracheostomy, PEG tube, and bedbound (trach change 8/18, PEG change 8/14), sacral pressure ulcer, BIBEMS from home for 6 day hx of bleeding from the tracheostomy and PEG tube with associated abdominal pain> now resolved. Endocrinology consulted for hyperglycemia. BG Goal 100-180mg/dl        Tolerating TFs at goal 60 ml/hr from 6am to 2am. Last 24 hour BGs variable 100s-218. 12 noon BG in 200s for 3 days.   Diet, NPO with Tube Feed:   Tube Feeding Modality: Gastrostomy  [facility] glucose support 1.2  Total Volume for 24 Hours (mL): 1200  Continuous  Starting Tube Feed Rate {mL per Hour}: 60  Increase Tube Feed Rate by (mL): 0  Until Goal Tube Feed Rate (mL per Hour): 60  Tube Feed Duration (in Hours): 20  Tube Feed Start Time: 06:00  Tube Feed Stop Time: 02:00  Free Water Flush  Pump   Rate (mL per Hour): 10   Frequency: Every 2 Hours  Alfred(7 Gm Arginine/7 Gm Glut/1.2 Gm HMB     Qty per Day:  2 (11-29-23 @ 14:12) [Active]         # Type 2 diabetes mellitus with hyperglycemia, with long-term current use of insulin.   ·  Plan: - FS BG monitoring [id number] while on TFs/NPO   - FBG trending above goal, increase to lantus 19 units sc qAM   - Monitor on recently adjusted    Regular insulin 30 units sc at 6am, 15 units sc at 12 noon and 9 units sc at 6pm. PLEASE DO NOT HOLD IF PATIENT IS ON TUBE FEEDS (hold only if TF are stopped). Can decrease/adjust dose if there is a concern for hypoglycemia.   - C/w low dose admelog correction scale every 6 hours  - please inform endocrine team if there are any changes in tube feeding( formula or rate)   - Will follow and adjust insulin as needed. Age: 73y    Gender: Female    POCT Blood Glucose:  207 mg/dL (12-27-23 @ 08:59)  200 mg/dL (12-27-23 @ 06:01)  126 mg/dL (12-26-23 @ 23:55)  211 mg/dL (12-26-23 @ 17:16)  211 mg/dL (12-26-23 @ 11:59)      eMAR:  insulin glargine Injectable (LANTUS)   18 Unit(s) SubCutaneous (12-27-23 @ 09:02)    insulin lispro (ADMELOG) corrective regimen sliding scale   1 Unit(s) SubCutaneous (12-27-23 @ 06:20)   2 Unit(s) SubCutaneous (12-26-23 @ 18:03)   2 Unit(s) SubCutaneous (12-26-23 @ 12:07)    insulin regular  human recombinant   15 Unit(s) SubCutaneous (12-26-23 @ 12:08)    insulin regular  human recombinant   30 Unit(s) SubCutaneous (12-27-23 @ 06:19)    insulin regular  human recombinant   9 Unit(s) SubCutaneous (12-26-23 @ 18:03)    levothyroxine   125 MICROGram(s) Oral (12-27-23 @ 05:06)    predniSONE   Tablet   5 milliGRAM(s) Oral (12-27-23 @ 05:06)    POC glucose, insulin requirements, lab values reviewed.    74 y/o F w/h/o T2DM with unknown control due to anemia of chronic disease skewing A1C levels. On Levemir and Humalog insulin PTA. Unknown DM complications. Also h/o HTN, HLD, anemia, hypothyroidism, RA, fibromyalgia, remote cerebral aneurysm repair, acute hypercapnic respiratory failure now with tracheostomy, PEG tube, and bedbound (trach change 8/18, PEG change 8/14), sacral pressure ulcer, BIBEMS from home for 6 day hx of bleeding from the tracheostomy and PEG tube with associated abdominal pain> now resolved. Endocrinology consulted for hyperglycemia. BG Goal 100-180mg/dl        Tolerating TFs at goal 60 ml/hr from 6am to 2am. Last 24 hour BGs variable 100s-218. 12 noon BG in 200s for 3 days.   Diet, NPO with Tube Feed:   Tube Feeding Modality: Gastrostomy  [facility] glucose support 1.2  Total Volume for 24 Hours (mL): 1200  Continuous  Starting Tube Feed Rate {mL per Hour}: 60  Increase Tube Feed Rate by (mL): 0  Until Goal Tube Feed Rate (mL per Hour): 60  Tube Feed Duration (in Hours): 20  Tube Feed Start Time: 06:00  Tube Feed Stop Time: 02:00  Free Water Flush  Pump   Rate (mL per Hour): 10   Frequency: Every 2 Hours  Alfred(7 Gm Arginine/7 Gm Glut/1.2 Gm HMB     Qty per Day:  2 (11-29-23 @ 14:12) [Active]         # Type 2 diabetes mellitus with hyperglycemia, with long-term current use of insulin.   ·  Plan: - FS BG monitoring [id number] while on TFs/NPO   - FBG trending above goal, increase to lantus 19 units sc qAM   - Monitor on recently adjusted    Regular insulin 30 units sc at 6am, 15 units sc at 12 noon and 9 units sc at 6pm. PLEASE DO NOT HOLD IF PATIENT IS ON TUBE FEEDS (hold only if TF are stopped). Can decrease/adjust dose if there is a concern for hypoglycemia.   - C/w low dose admelog correction scale every 6 hours  - please inform endocrine team if there are any changes in tube feeding( formula or rate)   - Will follow and adjust insulin as needed.

## 2023-12-27 NOTE — PROGRESS NOTE ADULT - ASSESSMENT
72 y/o F with PMHx of T2DM, HTN, HLD, anemia, hypothyroidism, RA, fibromyalgia, remote cerebral aneurysm repair, acute hypercapnic respiratory failure now with tracheostomy, PEG tube, and bedbound (trach change 8/18, PEG change 8/14), sacral pressure ulcer, BIBEMS from home for 6 day hx of bleeding from the tracheostomy and PEG tube with associated abdominal pain, recent history of auditory and visual hallucinations, and with chronic sacral decubitus ulcer. Exam notable for mild abdominal distention with LLQ and RLQ tenderness, tracheostomy in place with no obvious bleeding, PEG tube with scant amount of dried blood, at baseline neurologic status. Imaging showing no active bleeding around tracheostomy site, however was notable for mild thickening along PEG tube tract, sacral ulcer extending to the coccyx suggestive of possible osteomyelitis, and bibasilar patchy lung opacities with volume loss. Patient admitted for respiratory support, wound care of tracheostomy and PEG, and management of sacral osteomyelitis. No further Trach and peg site bleeding noted since admission.  Pelvic MRI imaging also suggestive of Acute OM. Patient placed on long-term antibiotics with Infectious disease guidance.  Additionally treated with a 7d course of Cefepime due to PSA and Serratia tracheitis on 11/8-->11/15 and then resumed cipro/keflex.  Hospital course c/b Delirium for which Seroquel was added and home Lexapro continued. Tracheostomy changed to #9 Cuffed [place] by ENT 11/3.  Despite trach change patient remained with persistent air leak.  On 11/19, the trach was again changed due to leak and loss of volumes on vent.  Trach was changed to #8 distal cuffed [place]iley.  Patient seen by Dermatology for back lesions.  One diagnosed as a seborrheic keratitis and the other a dermatofibroma.  Intermittent abdominal pain throughout hospital course, at times relieved with gas/BM, w/u negative, seen by GI - no recs.  12/10 pt completed cipro and keflex for osteo treatment.  12/13 moderate amounts of secretions w/ blood - tranexamic acid neb given.      Wound Consult requested to assist w/ management of:  Sacral stage 4 pressure injury    Sacral wound- improving, continue w/Aquacel dressing QD  Trach Mngt as per RCU  PEG Mngt as per GI  BLE elevation  Abx per RCU/ ID  Moisturize intact skin w/ SWEEN cream BID  Nutrition Consult for optimization          encourage high quality protein, ayush/ prosource, MVI & Vit C to promote wound healing  Hyperglycemia -  ADA diet and  FS w/ ISS,  Continue turning and positioning w/ offloading assistive devices as per protocol  Buttock Kanchan BID and prn soiling        Continue w/ attends under pads and Pericare w/ emerson cath maintenance as per protocol  Waffle Cushion to chair when oob to chair  Continue w/ low air loss pressure redistribution bed surface   Pt will need Group 2 mattress on hospital bed and ROHO cushion for wheel chair upon discharge home  Care as per RCU, will follow w/ you  Upon discharge f/u as outpatient at [facility] [address] [facility] [phone]  d/w mary  RN & team   Angela Anthony PA-C CWS [id number]  Nights/ Weekends/ Holidays please call:  General Surgery Consult pager ([phone]) for emergencies  Wound PT for multilayer leg wrapping or VAC issues ([phone])   I spent 35 minutes face to face w/ this pt of which more than 50% of the time was spent counseling & coordinating care of this pt.    74 y/o F with PMHx of T2DM, HTN, HLD, anemia, hypothyroidism, RA, fibromyalgia, remote cerebral aneurysm repair, acute hypercapnic respiratory failure now with tracheostomy, PEG tube, and bedbound (trach change 8/18, PEG change 8/14), sacral pressure ulcer, BIBEMS from home for 6 day hx of bleeding from the tracheostomy and PEG tube with associated abdominal pain, recent history of auditory and visual hallucinations, and with chronic sacral decubitus ulcer. Exam notable for mild abdominal distention with LLQ and RLQ tenderness, tracheostomy in place with no obvious bleeding, PEG tube with scant amount of dried blood, at baseline neurologic status. Imaging showing no active bleeding around tracheostomy site, however was notable for mild thickening along PEG tube tract, sacral ulcer extending to the coccyx suggestive of possible osteomyelitis, and bibasilar patchy lung opacities with volume loss. Patient admitted for respiratory support, wound care of tracheostomy and PEG, and management of sacral osteomyelitis. No further Trach and peg site bleeding noted since admission.  Pelvic MRI imaging also suggestive of Acute OM. Patient placed on long-term antibiotics with Infectious disease guidance.  Additionally treated with a 7d course of Cefepime due to PSA and Serratia tracheitis on 11/8-->11/15 and then resumed cipro/keflex.  Hospital course c/b Delirium for which Seroquel was added and home Lexapro continued. Tracheostomy changed to #9 Cuffed [place] by ENT 11/3.  Despite trach change patient remained with persistent air leak.  On 11/19, the trach was again changed due to leak and loss of volumes on vent.  Trach was changed to #8 distal cuffed Shiley.  Patient seen by Dermatology for back lesions.  One diagnosed as a seborrheic keratitis and the other a dermatofibroma.  Intermittent abdominal pain throughout hospital course, at times relieved with gas/BM, w/u negative, seen by GI - no recs.  12/10 pt completed cipro and keflex for osteo treatment.  12/13 moderate amounts of secretions w/ blood - tranexamic acid neb given.      Wound Consult requested to assist w/ management of:  Sacral stage 4 pressure injury    Sacral wound- improving, continue w/Aquacel dressing QD  Trach Mngt as per RCU  PEG Mngt as per GI  BLE elevation  Abx per RCU/ ID  Moisturize intact skin w/ SWEEN cream BID  Nutrition Consult for optimization          encourage high quality protein, ayush/ prosource, MVI & Vit C to promote wound healing  Hyperglycemia -  ADA diet and  FS w/ ISS,  Continue turning and positioning w/ offloading assistive devices as per protocol  Buttock Kanchan BID and prn soiling        Continue w/ attends under pads and Pericare w/ emerson cath maintenance as per protocol  Waffle Cushion to chair when oob to chair  Continue w/ low air loss pressure redistribution bed surface   Pt will need Group 2 mattress on hospital bed and ROHO cushion for wheel chair upon discharge home  Care as per RCU, will follow w/ you  Upon discharge f/u as outpatient at Wound [facility] [address] [facility] [phone]  d/w mary  RN & team   Angela Anthony PA-C CWS [id number]  Nights/ Weekends/ Holidays please call:  General Surgery Consult pager ([phone]) for emergencies  Wound PT for multilayer leg wrapping or VAC issues ([phone])   I spent 35 minutes face to face w/ this pt of which more than 50% of the time was spent counseling & coordinating care of this pt.

## 2023-12-27 NOTE — PROGRESS NOTE ADULT - SUBJECTIVE AND OBJECTIVE BOX
Patient is a 73y old  Female who presents with a chief complaint of PEG Bleeding (22 Dec 2023 07:35)      Interval Events:  No acute events overnight.    REVIEW OF SYSTEMS:  [ ] Positive  [X] All other systems negative  [ ] Unable to assess ROS because ________    Vital Signs Last 24 Hrs  T(C): 36.8 (12-26-23 @ 05:41), Max: 37.3 (12-25-23 @ 09:55)  T(F): 98.3 (12-26-23 @ 05:41), Max: 99.1 (12-25-23 @ 09:55)  HR: 91 (12-26-23 @ 05:41) (82 - 98)  BP: 140/63 (12-26-23 @ 05:41) (117/53 - 149/69)  RR: 18 (12-26-23 @ 05:41) (16 - 18)  SpO2: 98% (12-26-23 @ 05:41) (98% - 100%)    PHYSICAL EXAM:  HEENT:   [X]Tracheostomy: #8 distal XLT shiley  [X]Pupils equal  [ ]No oral lesions  [X]Abnormal - + missing teeth, + loose teeth    SKIN  [ ]No Rash  [ ] Abnormal  [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    CARDIAC  [X]Regular  [ ]Abnormal    PULMONARY  [ ]Bilateral Clear Breath Sounds  [ ]Normal Excursion  [X]Abnormal - mildly coarse BS BL    GI  [X]PEG      [X] +BS		              [X]Soft, nondistended, nontender	  [ ]Abnormal    MUSCULOSKELETAL                                   [X]Bedbound                 [ ]Abnormal    [ ]Ambulatory/OOB to chair                           EXTREMITIES                                         [X]Normal  [ ]Edema                           NEUROLOGIC  [X] Normal, non focal: opens eyes spontaneously, followings commands on all 4 extremities  [ ] Focal findings:    PSYCHIATRIC  [X]Alert and appropriate  [ ] Sedated	 [ ]Agitated    :  Mcbride: [ ] Yes, if yes: Date of Placement:                   [X] No    LINES: Central Lines [ ] Yes, if yes: Date of Placement                                     [X] No    HOSPITAL MEDICATIONS:  MEDICATIONS  (STANDING):  albuterol/ipratropium for Nebulization 3 milliLiter(s) Nebulizer every 6 hours  amLODIPine   Tablet 10 milliGRAM(s) Oral daily  artificial  tears Solution 2 Drop(s) Both EYES four times a day  ascorbic acid 500 milliGRAM(s) Oral daily  calcium carbonate    500 mG (Tums) Chewable 1 Tablet(s) Chew every 12 hours  chlorhexidine 0.12% Liquid 15 milliLiter(s) Oral Mucosa every 12 hours  chlorhexidine 4% Liquid 1 Application(s) Topical <User Schedule>  dextrose 50% Injectable 12.5 Gram(s) IV Push once  dextrose 50% Injectable 25 Gram(s) IV Push once  escitalopram 10 milliGRAM(s) Oral <User Schedule>  gabapentin Solution 100 milliGRAM(s) Enteral Tube at bedtime  glucagon  Injectable 1 milliGRAM(s) IntraMuscular once  hemorrhoidal Ointment      hemorrhoidal Ointment 1 Application(s) Rectal daily  insulin glargine Injectable (LANTUS) 18 Unit(s) SubCutaneous every morning  insulin lispro (ADMELOG) corrective regimen sliding scale   SubCutaneous every 6 hours  insulin regular  human recombinant 15 Unit(s) SubCutaneous <User Schedule>  insulin regular  human recombinant 28 Unit(s) SubCutaneous <User Schedule>  insulin regular  human recombinant 9 Unit(s) SubCutaneous <User Schedule>  levothyroxine 125 MICROGram(s) Oral <User Schedule>  lidocaine   4% Patch 1 Patch Transdermal daily  lidocaine   4% Patch 1 Patch Transdermal daily  lidocaine 2% Viscous 5 milliLiter(s) Mucosal two times a day  melatonin 5 milliGRAM(s) Oral at bedtime  melatonin 1 milliGRAM(s) Oral at bedtime  multivitamin/minerals/iron Oral Solution (CENTRUM) 15 milliLiter(s) Oral daily  nystatin Powder 1 Application(s) Topical every 8 hours  pantoprazole   Suspension 40 milliGRAM(s) Enteral Tube <User Schedule>  petrolatum Ophthalmic Ointment 1 Application(s) Both EYES four times a day  predniSONE   Tablet 5 milliGRAM(s) Oral daily  QUEtiapine 12.5 milliGRAM(s) Oral <User Schedule>  silver nitrate Applicator 1 Application(s) Topical once  simethicone 80 milliGRAM(s) Chew four times a day  zinc oxide 40% Paste 1 Application(s) Topical daily    MEDICATIONS  (PRN):  acetaminophen   Oral Liquid .. 1000 milliGRAM(s) Enteral Tube every 6 hours PRN Temp greater or equal to 38C (100.4F), Mild Pain (1 - 3)  benzocaine 20% Spray 1 Spray(s) Topical four times a day PRN Cough  diclofenac sodium 1% Gel 2 Gram(s) Topical four times a day PRN pain  diphenhydrAMINE Elixir 25 milliGRAM(s) Oral every 6 hours PRN Rash and/or Itching  guaifenesin/dextromethorphan Oral Liquid 10 milliLiter(s) Oral every 6 hours PRN Cough  oxyCODONE    Solution 2.5 milliGRAM(s) Oral every 6 hours PRN Moderate Pain (4 - 6)  sodium chloride 0.65% Nasal 1 Spray(s) Both Nostrils every 6 hours PRN Nasal Congestion      LABS:                        8.6    6.76  )-----------( 126      ( 25 Dec 2023 06:40 )             29.8     12-25    144  |  103  |  31<H>  ----------------------------<  166<H>  3.6   |  32<H>  |  <0.30<L>    Ca    10.7<H>      25 Dec 2023 06:40  Phos  4.2     12-25  Mg     1.9     12-25        Urinalysis Basic - ( 25 Dec 2023 06:40 )    Color: x / Appearance: x / SG: x / pH: x  Gluc: 166 mg/dL / Ketone: x  / Bili: x / Urobili: x   Blood: x / Protein: x / Nitrite: x   Leuk Esterase: x / RBC: x / WBC x   Sq Epi: x / Non Sq Epi: x / Bacteria: x          CAPILLARY BLOOD GLUCOSE    MICROBIOLOGY:     RADIOLOGY:  [ ] Reviewed and interpreted by me    Mode: AC/ CMV (Assist Control/ Continuous Mandatory Ventilation)  RR (machine): 12  TV (machine): 300  FiO2: 30  PEEP: 5  ITime: 1  MAP: 8  PIP: 28   Patient is a 73y old  Female who presents with a chief complaint of PEG Bleeding (22 Dec 2023 07:35)      Interval Events:  Failed PS 12/5 yst  RT suctioned q 4 hrs      REVIEW OF SYSTEMS:  [ ] Positive  [X] All other systems negative  [ ] Unable to assess ROS because ________    Vital Signs Last 24 Hrs  T(C): 36.8 (12-26-23 @ 05:41), Max: 37.3 (12-25-23 @ 09:55)  T(F): 98.3 (12-26-23 @ 05:41), Max: 99.1 (12-25-23 @ 09:55)  HR: 91 (12-26-23 @ 05:41) (82 - 98)  BP: 140/63 (12-26-23 @ 05:41) (117/53 - 149/69)  RR: 18 (12-26-23 @ 05:41) (16 - 18)  SpO2: 98% (12-26-23 @ 05:41) (98% - 100%)    PHYSICAL EXAM:  HEENT:   [X]Tracheostomy: #8 distal XLT shiley  [X]Pupils equal  [ ]No oral lesions  [X]Abnormal - + missing teeth, + loose teeth    SKIN  [ ]No Rash  [ ] Abnormal  [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    CARDIAC  [X]Regular  [ ]Abnormal    PULMONARY  [ ]Bilateral Clear Breath Sounds  [ ]Normal Excursion  [X]Abnormal - mildly coarse BS BL    GI  [X]PEG      [X] +BS		              [X]Soft, nondistended, nontender	  [ ]Abnormal    MUSCULOSKELETAL                                   [X]Bedbound                 [ ]Abnormal    [ ]Ambulatory/OOB to chair                           EXTREMITIES                                         [X]Normal  [ ]Edema                           NEUROLOGIC  [X] Normal, non focal: opens eyes spontaneously, followings commands on all 4 extremities  [ ] Focal findings:    PSYCHIATRIC  [X]Alert and appropriate  [ ] Sedated	 [ ]Agitated    :  Mcbride: [ ] Yes, if yes: Date of Placement:                   [X] No    LINES: Central Lines [ ] Yes, if yes: Date of Placement                                     [X] No    HOSPITAL MEDICATIONS:  MEDICATIONS  (STANDING):  albuterol/ipratropium for Nebulization 3 milliLiter(s) Nebulizer every 6 hours  amLODIPine   Tablet 10 milliGRAM(s) Oral daily  artificial  tears Solution 2 Drop(s) Both EYES four times a day  ascorbic acid 500 milliGRAM(s) Oral daily  calcium carbonate    500 mG (Tums) Chewable 1 Tablet(s) Chew every 12 hours  chlorhexidine 0.12% Liquid 15 milliLiter(s) Oral Mucosa every 12 hours  chlorhexidine 4% Liquid 1 Application(s) Topical <User Schedule>  dextrose 50% Injectable 12.5 Gram(s) IV Push once  dextrose 50% Injectable 25 Gram(s) IV Push once  escitalopram 10 milliGRAM(s) Oral <User Schedule>  gabapentin Solution 100 milliGRAM(s) Enteral Tube at bedtime  glucagon  Injectable 1 milliGRAM(s) IntraMuscular once  hemorrhoidal Ointment      hemorrhoidal Ointment 1 Application(s) Rectal daily  insulin glargine Injectable (LANTUS) 18 Unit(s) SubCutaneous every morning  insulin lispro (ADMELOG) corrective regimen sliding scale   SubCutaneous every 6 hours  insulin regular  human recombinant 15 Unit(s) SubCutaneous <User Schedule>  insulin regular  human recombinant 28 Unit(s) SubCutaneous <User Schedule>  insulin regular  human recombinant 9 Unit(s) SubCutaneous <User Schedule>  levothyroxine 125 MICROGram(s) Oral <User Schedule>  lidocaine   4% Patch 1 Patch Transdermal daily  lidocaine   4% Patch 1 Patch Transdermal daily  lidocaine 2% Viscous 5 milliLiter(s) Mucosal two times a day  melatonin 5 milliGRAM(s) Oral at bedtime  melatonin 1 milliGRAM(s) Oral at bedtime  multivitamin/minerals/iron Oral Solution (CENTRUM) 15 milliLiter(s) Oral daily  nystatin Powder 1 Application(s) Topical every 8 hours  pantoprazole   Suspension 40 milliGRAM(s) Enteral Tube <User Schedule>  petrolatum Ophthalmic Ointment 1 Application(s) Both EYES four times a day  predniSONE   Tablet 5 milliGRAM(s) Oral daily  QUEtiapine 12.5 milliGRAM(s) Oral <User Schedule>  silver nitrate Applicator 1 Application(s) Topical once  simethicone 80 milliGRAM(s) Chew four times a day  zinc oxide 40% Paste 1 Application(s) Topical daily    MEDICATIONS  (PRN):  acetaminophen   Oral Liquid .. 1000 milliGRAM(s) Enteral Tube every 6 hours PRN Temp greater or equal to 38C (100.4F), Mild Pain (1 - 3)  benzocaine 20% Spray 1 Spray(s) Topical four times a day PRN Cough  diclofenac sodium 1% Gel 2 Gram(s) Topical four times a day PRN pain  diphenhydrAMINE Elixir 25 milliGRAM(s) Oral every 6 hours PRN Rash and/or Itching  guaifenesin/dextromethorphan Oral Liquid 10 milliLiter(s) Oral every 6 hours PRN Cough  oxyCODONE    Solution 2.5 milliGRAM(s) Oral every 6 hours PRN Moderate Pain (4 - 6)  sodium chloride 0.65% Nasal 1 Spray(s) Both Nostrils every 6 hours PRN Nasal Congestion      LABS:                        8.6    6.76  )-----------( 126      ( 25 Dec 2023 06:40 )             29.8     12-25    144  |  103  |  31<H>  ----------------------------<  166<H>  3.6   |  32<H>  |  <0.30<L>    Ca    10.7<H>      25 Dec 2023 06:40  Phos  4.2     12-25  Mg     1.9     12-25        Urinalysis Basic - ( 25 Dec 2023 06:40 )    Color: x / Appearance: x / SG: x / pH: x  Gluc: 166 mg/dL / Ketone: x  / Bili: x / Urobili: x   Blood: x / Protein: x / Nitrite: x   Leuk Esterase: x / RBC: x / WBC x   Sq Epi: x / Non Sq Epi: x / Bacteria: x          CAPILLARY BLOOD GLUCOSE    MICROBIOLOGY:     RADIOLOGY:  [ ] Reviewed and interpreted by me    Mode: AC/ CMV (Assist Control/ Continuous Mandatory Ventilation)  RR (machine): 12  TV (machine): 300  FiO2: 30  PEEP: 5  ITime: 1  MAP: 8  PIP: 28

## 2023-12-27 NOTE — PROGRESS NOTE ADULT - ASSESSMENT
73 year old female with respiratory failure s/p trach and PEG, sacral osteomyelitis.     leaking from chronic PEG site, seems to have resolved  - if precurs, options include lowering the rate of tube feeds +/- a trial of reglan (pt was previously on it but it was stopped due to diarrhea)   - cont PPI      I had a prolonged conversation with the patients daughter regarding the hospital course, differential diagnosis, results of diagnostic tests this far, and therapeutic modalities available. Plan of care discussed with the patient after the evaluation. Patient expresses a clear understanding of the plan of care.    Andrew Cleaning D.O.  Gastroenterology and Hepatology  [address], [address]  [place], [place]  Office: [phone]   73 year old female with respiratory failure s/p trach and PEG, sacral osteomyelitis.     leaking from chronic PEG site, seems to have resolved  - if precurs, options include lowering the rate of tube feeds +/- a trial of reglan (pt was previously on it but it was stopped due to diarrhea)   - cont PPI      I had a prolonged conversation with the patients daughter regarding the hospital course, differential diagnosis, results of diagnostic tests this far, and therapeutic modalities available. Plan of care discussed with the patient after the evaluation. Patient expresses a clear understanding of the plan of care.    Andrew Cleaning D.O.  Gastroenterology and Hepatology  [address], [address]  [place], [place]  Office: [phone]

## 2023-12-27 NOTE — PROGRESS NOTE ADULT - SUBJECTIVE AND OBJECTIVE BOX
Chief Complaint:  Patient is a 73y old  Female who presents with a chief complaint of PEG Bleeding (22 Dec 2023 07:35)      Date of service 23 @ 15:18      Interval Events:   PEG site c/d/i    Hospital Medications:  acetaminophen   Oral Liquid .. 1000 milliGRAM(s) Enteral Tube every 6 hours PRN  albuterol/ipratropium for Nebulization 3 milliLiter(s) Nebulizer every 6 hours  amLODIPine   Tablet 10 milliGRAM(s) Oral daily  artificial  tears Solution 2 Drop(s) Both EYES four times a day  ascorbic acid 500 milliGRAM(s) Oral daily  benzocaine 20% Spray 1 Spray(s) Topical four times a day PRN  calcium carbonate    500 mG (Tums) Chewable 1 Tablet(s) Chew every 12 hours  chlorhexidine 0.12% Liquid 15 milliLiter(s) Oral Mucosa every 12 hours  chlorhexidine 4% Liquid 1 Application(s) Topical <User Schedule>  dextrose 50% Injectable 12.5 Gram(s) IV Push once  dextrose 50% Injectable 25 Gram(s) IV Push once  diclofenac sodium 1% Gel 2 Gram(s) Topical four times a day PRN  diphenhydrAMINE Elixir 25 milliGRAM(s) Oral every 6 hours PRN  escitalopram 10 milliGRAM(s) Oral <User Schedule>  gabapentin Solution 100 milliGRAM(s) Enteral Tube at bedtime  glucagon  Injectable 1 milliGRAM(s) IntraMuscular once  guaifenesin/dextromethorphan Oral Liquid 10 milliLiter(s) Oral every 6 hours PRN  hemorrhoidal Ointment      hemorrhoidal Ointment 1 Application(s) Rectal daily  insulin lispro (ADMELOG) corrective regimen sliding scale   SubCutaneous every 6 hours  insulin regular  human recombinant 15 Unit(s) SubCutaneous <User Schedule>  insulin regular  human recombinant 30 Unit(s) SubCutaneous <User Schedule>  insulin regular  human recombinant 9 Unit(s) SubCutaneous <User Schedule>  levothyroxine 125 MICROGram(s) Oral <User Schedule>  lidocaine   4% Patch 1 Patch Transdermal daily  lidocaine   4% Patch 1 Patch Transdermal daily  lidocaine 2% Viscous 5 milliLiter(s) Mucosal two times a day  melatonin 5 milliGRAM(s) Oral at bedtime  melatonin 1 milliGRAM(s) Oral at bedtime  multivitamin/minerals/iron Oral Solution (CENTRUM) 15 milliLiter(s) Oral daily  nystatin Powder 1 Application(s) Topical every 8 hours  oxyCODONE    Solution 2.5 milliGRAM(s) Oral every 6 hours PRN  pantoprazole   Suspension 40 milliGRAM(s) Enteral Tube <User Schedule>  petrolatum Ophthalmic Ointment 1 Application(s) Both EYES four times a day  potassium chloride   Solution 20 milliEquivalent(s) Oral daily  predniSONE   Tablet 5 milliGRAM(s) Oral daily  QUEtiapine 12.5 milliGRAM(s) Oral <User Schedule>  silver nitrate Applicator 1 Application(s) Topical once  simethicone 80 milliGRAM(s) Chew four times a day  sodium chloride 0.65% Nasal 1 Spray(s) Both Nostrils every 6 hours PRN  zinc oxide 40% Paste 1 Application(s) Topical daily        Review of Systems:  unable to obtain    PHYSICAL EXAM:   Vital Signs:  Vital Signs Last 24 Hrs  T(C): 36.7 (27 Dec 2023 12:31), Max: 37 (27 Dec 2023 05:19)  T(F): 98.1 (27 Dec 2023 12:31), Max: 98.6 (27 Dec 2023 05:19)  HR: 90 (27 Dec 2023 13:05) (74 - 98)  BP: 110/72 (27 Dec 2023 12:31) (110/72 - 163/77)  BP(mean): --  RR: 13 (27 Dec 2023 05:19) (13 - 20)  SpO2: 100% (27 Dec 2023 13:05) (100% - 100%)    Parameters below as of 27 Dec 2023 13:05  Patient On (Oxygen Delivery Method): ventilator      Daily     Daily Weight in k.4 (27 Dec 2023 09:12)      PHYSICAL EXAM:     GENERAL:  Appears stated age, well-groomed, well-nourished, no distress  HEENT:  NC/AT,  conjunctivae anicteric, clear and pink,   NECK: supple, trachea midline  CHEST:  Full & symmetric excursion, no increased effort, breath sounds clear  HEART:  Regular rhythm, no JVD  ABDOMEN:  Soft, non-tender, non-distended, normoactive bowel sounds,  no masses , no hepatosplenomegaly  EXTREMITIES:  no cyanosis,clubbing or edema  SKIN:  No rash, erythema, or, ecchymoses, no jaundice  NEURO:  Alert, non-focal, no asterixis  PSYCH: Appropriate affect, oriented to place and time  RECTAL: Deferred      LABS Personally reviewed by me:                        8.6    10.15 )-----------( 132      ( 27 Dec 2023 07:19 )             30.8     Mean Cell Volume: 95.7 fl (- @ 07:19)        146<H>  |  102  |  34<H>  ----------------------------<  196<H>  3.5   |  33<H>  |  <0.30<L>    Ca    10.8<H>      27 Dec 2023 07:21  Phos  4.6       Mg     1.9               Urinalysis Basic - ( 27 Dec 2023 07:21 )    Color: x / Appearance: x / SG: x / pH: x  Gluc: 196 mg/dL / Ketone: x  / Bili: x / Urobili: x   Blood: x / Protein: x / Nitrite: x   Leuk Esterase: x / RBC: x / WBC x   Sq Epi: x / Non Sq Epi: x / Bacteria: x                              8.6    10.15 )-----------( 132      ( 27 Dec 2023 07:19 )             30.8                         8.6    6.76  )-----------( 126      ( 25 Dec 2023 06:40 )             29.8       Imaging personally reviewed by me:

## 2023-12-27 NOTE — PROGRESS NOTE ADULT - SUBJECTIVE AND OBJECTIVE BOX
[facility]-- WOUND TEAM -- FOLLOW UP NOTE  --------------------------------------------------------------------------------    24 hour events/subjective:          Diet:  Diet, NPO with Tube Feed:   Tube Feeding Modality: Gastrostomy  [employer] glucose support 1.2  Total Volume for 24 Hours (mL): 1200  Continuous  Starting Tube Feed Rate mL per Hour: 60  Increase Tube Feed Rate by (mL): 0  Until Goal Tube Feed Rate (mL per Hour): 60  Tube Feed Duration (in Hours): 20  Tube Feed Start Time: 06:00  Tube Feed Stop Time: 02:00  Free Water Flush  Pump   Rate (mL per Hour): 10   Frequency: Every 2 Hours  Alfred(7 Gm Arginine/7 Gm Glut/1.2 Gm HMB     Qty per Day:  2 (11-29-23 @ 14:12)      ROS: pt unable to offer    ALLERGIES & MEDICATIONS  --------------------------------------------------------------------------------  Allergies  penicillin (Unknown)  heparin (Unknown)  Tagamet (Unknown)  pineapple (Unknown)  walnut (Unknown)  Andressa Rawls Hazelcharlene (Unknown)  Lyrica (Unknown)      STANDING INPATIENT MEDICATIONS  albuterol/ipratropium for Nebulization 3 milliLiter(s) Nebulizer every 6 hours  amLODIPine   Tablet 10 milliGRAM(s) Oral daily  artificial  tears Solution 2 Drop(s) Both EYES four times a day  ascorbic acid 500 milliGRAM(s) Oral daily  calcium carbonate    500 mG (Tums) Chewable 1 Tablet(s) Chew every 12 hours  chlorhexidine 0.12% Liquid 15 milliLiter(s) Oral Mucosa every 12 hours  chlorhexidine 4% Liquid 1 Application(s) Topical <User Schedule>  dextrose 50% Injectable 12.5 Gram(s) IV Push once  dextrose 50% Injectable 25 Gram(s) IV Push once  escitalopram 10 milliGRAM(s) Oral <User Schedule>  gabapentin Solution 100 milliGRAM(s) Enteral Tube at bedtime  glucagon  Injectable 1 milliGRAM(s) IntraMuscular once   hemorrhoidal Ointment 1 Application(s) Rectal daily  insulin lispro (ADMELOG) corrective regimen sliding scale   SubCutaneous every 6 hours  insulin regular  human recombinant 9 Unit(s) SubCutaneous <User Schedule>  insulin regular  human recombinant 15 Unit(s) SubCutaneous <User Schedule>  insulin regular  human recombinant 30 Unit(s) SubCutaneous <User Schedule>  levothyroxine 125 MICROGram(s) Oral <User Schedule>  lidocaine   4% Patch 1 Patch Transdermal daily  lidocaine   4% Patch 1 Patch Transdermal daily  lidocaine 2% Viscous 5 milliLiter(s) Mucosal two times a day  melatonin 1 milliGRAM(s) Oral at bedtime  melatonin 5 milliGRAM(s) Oral at bedtime  multivitamin/minerals/iron Oral Solution (CENTRUM) 15 milliLiter(s) Oral daily  nystatin Powder 1 Application(s) Topical every 8 hours  pantoprazole   Suspension 40 milliGRAM(s) Enteral Tube <User Schedule>  petrolatum Ophthalmic Ointment 1 Application(s) Both EYES four times a day  potassium chloride   Solution 20 milliEquivalent(s) Oral daily  predniSONE   Tablet 5 milliGRAM(s) Oral daily  QUEtiapine 12.5 milliGRAM(s) Oral <User Schedule>  silver nitrate Applicator 1 Application(s) Topical once  simethicone 80 milliGRAM(s) Chew four times a day  zinc oxide 40% Paste 1 Application(s) Topical daily      PRN INPATIENT MEDICATION  acetaminophen   Oral Liquid .. 1000 milliGRAM(s) Enteral Tube every 6 hours PRN  benzocaine 20% Spray 1 Spray(s) Topical four times a day PRN  diclofenac sodium 1% Gel 2 Gram(s) Topical four times a day PRN  diphenhydrAMINE Elixir 25 milliGRAM(s) Oral every 6 hours PRN  guaifenesin/dextromethorphan Oral Liquid 10 milliLiter(s) Oral every 6 hours PRN  oxyCODONE    Solution 2.5 milliGRAM(s) Oral every 6 hours PRN  sodium chloride 0.65% Nasal 1 Spray(s) Both Nostrils every 6 hours PRN        VITALS/PHYSICAL EXAM  --------------------------------------------------------------------------------  T(C): 36.7 (12-27-23 @ 12:31), Max: 37 (12-27-23 @ 05:19)  HR: 89 (12-27-23 @ 14:31) (74 - 96)  BP: 110/72 (12-27-23 @ 12:31) (110/72 - 163/77)  RR: 13 (12-27-23 @ 05:19) (13 - 20)  SpO2: 99% (12-27-23 @ 14:31) (99% - 100%)  Wt(kg): --                LABS/ CULTURES/ RADIOLOGY:              8.6    10.15 >-----------<  132      [12-27-23 @ 07:19]              30.8     146  |  102  |  34  ----------------------------<  196      [12-27-23 @ 07:21]  3.5   |  33  |  <0.30        Ca     10.8     [12-27-23 @ 07:21]      Mg     1.9     [12-27-23 @ 07:21]      Phos  4.6     [12-27-23 @ 07:21]      CAPILLARY BLOOD GLUCOSE  POCT Blood Glucose.: 221 mg/dL (27 Dec 2023 12:20)  POCT Blood Glucose.: 207 mg/dL (27 Dec 2023 08:59)  POCT Blood Glucose.: 200 mg/dL (27 Dec 2023 06:01)  POCT Blood Glucose.: 126 mg/dL (26 Dec 2023 23:55)  POCT Blood Glucose.: 211 mg/dL (26 Dec 2023 17:16)    A1C with Estimated Average Glucose Result: 7.5 % (10-28-23 @ 07:18)   [facility]-- WOUND TEAM -- FOLLOW UP NOTE  --------------------------------------------------------------------------------    24 hour events/subjective:          Diet:  Diet, NPO with Tube Feed:   Tube Feeding Modality: Gastrostomy  [employer] glucose support 1.2  Total Volume for 24 Hours (mL): 1200  Continuous  Starting Tube Feed Rate mL per Hour: 60  Increase Tube Feed Rate by (mL): 0  Until Goal Tube Feed Rate (mL per Hour): 60  Tube Feed Duration (in Hours): 20  Tube Feed Start Time: 06:00  Tube Feed Stop Time: 02:00  Free Water Flush  Pump   Rate (mL per Hour): 10   Frequency: Every 2 Hours  Alfred(7 Gm Arginine/7 Gm Glut/1.2 Gm HMB     Qty per Day:  2 (11-29-23 @ 14:12)      ROS: pt unable to offer    ALLERGIES & MEDICATIONS  --------------------------------------------------------------------------------  Allergies  penicillin (Unknown)  heparin (Unknown)  Tagamet (Unknown)  pineapple (Unknown)  walnut (Unknown)  Andressa Rawls Hazelcharlene (Unknown)  Lyrica (Unknown)      STANDING INPATIENT MEDICATIONS  albuterol/ipratropium for Nebulization 3 milliLiter(s) Nebulizer every 6 hours  amLODIPine   Tablet 10 milliGRAM(s) Oral daily  artificial  tears Solution 2 Drop(s) Both EYES four times a day  ascorbic acid 500 milliGRAM(s) Oral daily  calcium carbonate    500 mG (Tums) Chewable 1 Tablet(s) Chew every 12 hours  chlorhexidine 0.12% Liquid 15 milliLiter(s) Oral Mucosa every 12 hours  chlorhexidine 4% Liquid 1 Application(s) Topical <User Schedule>  dextrose 50% Injectable 12.5 Gram(s) IV Push once  dextrose 50% Injectable 25 Gram(s) IV Push once  escitalopram 10 milliGRAM(s) Oral <User Schedule>  gabapentin Solution 100 milliGRAM(s) Enteral Tube at bedtime  glucagon  Injectable 1 milliGRAM(s) IntraMuscular once   hemorrhoidal Ointment 1 Application(s) Rectal daily  insulin lispro (ADMELOG) corrective regimen sliding scale   SubCutaneous every 6 hours  insulin regular  human recombinant 9 Unit(s) SubCutaneous <User Schedule>  insulin regular  human recombinant 15 Unit(s) SubCutaneous <User Schedule>  insulin regular  human recombinant 30 Unit(s) SubCutaneous <User Schedule>  levothyroxine 125 MICROGram(s) Oral <User Schedule>  lidocaine   4% Patch 1 Patch Transdermal daily  lidocaine   4% Patch 1 Patch Transdermal daily  lidocaine 2% Viscous 5 milliLiter(s) Mucosal two times a day  melatonin 1 milliGRAM(s) Oral at bedtime  melatonin 5 milliGRAM(s) Oral at bedtime  multivitamin/minerals/iron Oral Solution (CENTRUM) 15 milliLiter(s) Oral daily  nystatin Powder 1 Application(s) Topical every 8 hours  pantoprazole   Suspension 40 milliGRAM(s) Enteral Tube <User Schedule>  petrolatum Ophthalmic Ointment 1 Application(s) Both EYES four times a day  potassium chloride   Solution 20 milliEquivalent(s) Oral daily  predniSONE   Tablet 5 milliGRAM(s) Oral daily  QUEtiapine 12.5 milliGRAM(s) Oral <User Schedule>  silver nitrate Applicator 1 Application(s) Topical once  simethicone 80 milliGRAM(s) Chew four times a day  zinc oxide 40% Paste 1 Application(s) Topical daily      PRN INPATIENT MEDICATION  acetaminophen   Oral Liquid .. 1000 milliGRAM(s) Enteral Tube every 6 hours PRN  benzocaine 20% Spray 1 Spray(s) Topical four times a day PRN  diclofenac sodium 1% Gel 2 Gram(s) Topical four times a day PRN  diphenhydrAMINE Elixir 25 milliGRAM(s) Oral every 6 hours PRN  guaifenesin/dextromethorphan Oral Liquid 10 milliLiter(s) Oral every 6 hours PRN  oxyCODONE    Solution 2.5 milliGRAM(s) Oral every 6 hours PRN  sodium chloride 0.65% Nasal 1 Spray(s) Both Nostrils every 6 hours PRN        VITALS/PHYSICAL EXAM  --------------------------------------------------------------------------------  T(C): 36.7 (12-27-23 @ 12:31), Max: 37 (12-27-23 @ 05:19)  HR: 89 (12-27-23 @ 14:31) (74 - 96)  BP: 110/72 (12-27-23 @ 12:31) (110/72 - 163/77)  RR: 13 (12-27-23 @ 05:19) (13 - 20)  SpO2: 99% (12-27-23 @ 14:31) (99% - 100%)  Wt(kg): --                LABS/ CULTURES/ RADIOLOGY:              8.6    10.15 >-----------<  132      [12-27-23 @ 07:19]              30.8     146  |  102  |  34  ----------------------------<  196      [12-27-23 @ 07:21]  3.5   |  33  |  <0.30        Ca     10.8     [12-27-23 @ 07:21]      Mg     1.9     [12-27-23 @ 07:21]      Phos  4.6     [12-27-23 @ 07:21]      CAPILLARY BLOOD GLUCOSE  POCT Blood Glucose.: 221 mg/dL (27 Dec 2023 12:20)  POCT Blood Glucose.: 207 mg/dL (27 Dec 2023 08:59)  POCT Blood Glucose.: 200 mg/dL (27 Dec 2023 06:01)  POCT Blood Glucose.: 126 mg/dL (26 Dec 2023 23:55)  POCT Blood Glucose.: 211 mg/dL (26 Dec 2023 17:16)    A1C with Estimated Average Glucose Result: 7.5 % (10-28-23 @ 07:18)   [facility]-- WOUND TEAM -- FOLLOW UP NOTE  --------------------------------------------------------------------------------    24 hour events/subjective:    afebrile  more alert  tolerating TF w/o vomiting  tolerating dressings     no odor pain excess drainage  incontinent-     Diet:  Diet, NPO with Tube Feed:   Tube Feeding Modality: Gastrostomy  [employer] glucose support 1.2  Total Volume for 24 Hours (mL): 1200  Continuous  Starting Tube Feed Rate mL per Hour: 60  Increase Tube Feed Rate by (mL): 0  Until Goal Tube Feed Rate (mL per Hour): 60  Tube Feed Duration (in Hours): 20  Tube Feed Start Time: 06:00  Tube Feed Stop Time: 02:00  Free Water Flush  Pump   Rate (mL per Hour): 10   Frequency: Every 2 Hours  Alfred(7 Gm Arginine/7 Gm Glut/1.2 Gm HMB     Qty per Day:  2 (11-29-23 @ 14:12)      ROS: pt unable to offer    ALLERGIES & MEDICATIONS  --------------------------------------------------------------------------------  Allergies  penicillin (Unknown)  heparin (Unknown)  Tagamet (Unknown)  pineapple (Unknown)  walnut (Unknown)  Andressa Rawls Hazelcharlene (Unknown)  Lyrica (Unknown)      STANDING INPATIENT MEDICATIONS  albuterol/ipratropium for Nebulization 3 milliLiter(s) Nebulizer every 6 hours  amLODIPine   Tablet 10 milliGRAM(s) Oral daily  artificial  tears Solution 2 Drop(s) Both EYES four times a day  ascorbic acid 500 milliGRAM(s) Oral daily  calcium carbonate    500 mG (Tums) Chewable 1 Tablet(s) Chew every 12 hours  chlorhexidine 0.12% Liquid 15 milliLiter(s) Oral Mucosa every 12 hours  chlorhexidine 4% Liquid 1 Application(s) Topical <User Schedule>  dextrose 50% Injectable 12.5 Gram(s) IV Push once  dextrose 50% Injectable 25 Gram(s) IV Push once  escitalopram 10 milliGRAM(s) Oral <User Schedule>  gabapentin Solution 100 milliGRAM(s) Enteral Tube at bedtime  glucagon  Injectable 1 milliGRAM(s) IntraMuscular once   hemorrhoidal Ointment 1 Application(s) Rectal daily  insulin lispro (ADMELOG) corrective regimen sliding scale   SubCutaneous every 6 hours  insulin regular  human recombinant 9 Unit(s) SubCutaneous <User Schedule>  insulin regular  human recombinant 15 Unit(s) SubCutaneous <User Schedule>  insulin regular  human recombinant 30 Unit(s) SubCutaneous <User Schedule>  levothyroxine 125 MICROGram(s) Oral <User Schedule>  lidocaine   4% Patch 1 Patch Transdermal daily  lidocaine   4% Patch 1 Patch Transdermal daily  lidocaine 2% Viscous 5 milliLiter(s) Mucosal two times a day  melatonin 1 milliGRAM(s) Oral at bedtime  melatonin 5 milliGRAM(s) Oral at bedtime  multivitamin/minerals/iron Oral Solution (CENTRUM) 15 milliLiter(s) Oral daily  nystatin Powder 1 Application(s) Topical every 8 hours  pantoprazole   Suspension 40 milliGRAM(s) Enteral Tube <User Schedule>  petrolatum Ophthalmic Ointment 1 Application(s) Both EYES four times a day  potassium chloride   Solution 20 milliEquivalent(s) Oral daily  predniSONE   Tablet 5 milliGRAM(s) Oral daily  QUEtiapine 12.5 milliGRAM(s) Oral <User Schedule>  silver nitrate Applicator 1 Application(s) Topical once  simethicone 80 milliGRAM(s) Chew four times a day  zinc oxide 40% Paste 1 Application(s) Topical daily      PRN INPATIENT MEDICATION  acetaminophen   Oral Liquid .. 1000 milliGRAM(s) Enteral Tube every 6 hours PRN  benzocaine 20% Spray 1 Spray(s) Topical four times a day PRN  diclofenac sodium 1% Gel 2 Gram(s) Topical four times a day PRN  diphenhydrAMINE Elixir 25 milliGRAM(s) Oral every 6 hours PRN  guaifenesin/dextromethorphan Oral Liquid 10 milliLiter(s) Oral every 6 hours PRN  oxyCODONE    Solution 2.5 milliGRAM(s) Oral every 6 hours PRN  sodium chloride 0.65% Nasal 1 Spray(s) Both Nostrils every 6 hours PRN        VITALS/PHYSICAL EXAM  --------------------------------------------------------------------------------  T(C): 36.7 (12-27-23 @ 12:31), Max: 37 (12-27-23 @ 05:19)  HR: 89 (12-27-23 @ 14:31) (74 - 96)  BP: 110/72 (12-27-23 @ 12:31) (110/72 - 163/77)  RR: 13 (12-27-23 @ 05:19) (13 - 20)  SpO2: 99% (12-27-23 @ 14:31) (99% - 100%)  Wt(kg): --    NAD,  Alert, frail,  WD/ WN/ WG  Total Care Sport bed  HEENT:  NC/AT, EOMI, sclera clear, mucosa moist, throat clear, trach       w/o secretions or peritrach skin irration  Gastrointestinal: soft NT/ND (+)PEG w/o drainage or skin irration  : (+) primafit  Neurology:  nonverbal, no follow commands, paraplegic  Psych: calm/ appropriate  Musculoskeletal:  pROM, no deformities/ contractures   Vascular: BLE equally warm,  no cyanosis, clubbing nor acute ischemia  Skin:  moist w/ good turgor  Sacral stage 4 pressure injury  4cm x 2cm x 0.3cm   moist granular wound bed   palpable but not exposed bone  no blistering   (+) serosanguinous drainage  No odor, erythema, increased warmth, tenderness, induration, fluctuance, nor crepitus            LABS/ CULTURES/ RADIOLOGY:              8.6    10.15 >-----------<  132      [12-27-23 @ 07:19]              30.8     146  |  102  |  34  ----------------------------<  196      [12-27-23 @ 07:21]  3.5   |  33  |  <0.30        Ca     10.8     [12-27-23 @ 07:21]      Mg     1.9     [12-27-23 @ 07:21]      Phos  4.6     [12-27-23 @ 07:21]      CAPILLARY BLOOD GLUCOSE  POCT Blood Glucose.: 221 mg/dL (27 Dec 2023 12:20)  POCT Blood Glucose.: 207 mg/dL (27 Dec 2023 08:59)  POCT Blood Glucose.: 200 mg/dL (27 Dec 2023 06:01)  POCT Blood Glucose.: 126 mg/dL (26 Dec 2023 23:55)  POCT Blood Glucose.: 211 mg/dL (26 Dec 2023 17:16)    A1C with Estimated Average Glucose Result: 7.5 % (10-28-23 @ 07:18)   [facility]-- WOUND TEAM -- FOLLOW UP NOTE  --------------------------------------------------------------------------------    24 hour events/subjective:    afebrile  more alert  tolerating TF w/o vomiting  tolerating dressings     no odor pain excess drainage  incontinent-     Diet:  Diet, NPO with Tube Feed:   Tube Feeding Modality: Gastrostomy  [employer] glucose support 1.2  Total Volume for 24 Hours (mL): 1200  Continuous  Starting Tube Feed Rate mL per Hour: 60  Increase Tube Feed Rate by (mL): 0  Until Goal Tube Feed Rate (mL per Hour): 60  Tube Feed Duration (in Hours): 20  Tube Feed Start Time: 06:00  Tube Feed Stop Time: 02:00  Free Water Flush  Pump   Rate (mL per Hour): 10   Frequency: Every 2 Hours  Alfred(7 Gm Arginine/7 Gm Glut/1.2 Gm HMB     Qty per Day:  2 (11-29-23 @ 14:12)      ROS: pt unable to offer    ALLERGIES & MEDICATIONS  --------------------------------------------------------------------------------  Allergies  penicillin (Unknown)  heparin (Unknown)  Tagamet (Unknown)  pineapple (Unknown)  walnut (Unknown)  Andressa Rawls Hazelcharlene (Unknown)  Lyrica (Unknown)      STANDING INPATIENT MEDICATIONS  albuterol/ipratropium for Nebulization 3 milliLiter(s) Nebulizer every 6 hours  amLODIPine   Tablet 10 milliGRAM(s) Oral daily  artificial  tears Solution 2 Drop(s) Both EYES four times a day  ascorbic acid 500 milliGRAM(s) Oral daily  calcium carbonate    500 mG (Tums) Chewable 1 Tablet(s) Chew every 12 hours  chlorhexidine 0.12% Liquid 15 milliLiter(s) Oral Mucosa every 12 hours  chlorhexidine 4% Liquid 1 Application(s) Topical <User Schedule>  dextrose 50% Injectable 12.5 Gram(s) IV Push once  dextrose 50% Injectable 25 Gram(s) IV Push once  escitalopram 10 milliGRAM(s) Oral <User Schedule>  gabapentin Solution 100 milliGRAM(s) Enteral Tube at bedtime  glucagon  Injectable 1 milliGRAM(s) IntraMuscular once   hemorrhoidal Ointment 1 Application(s) Rectal daily  insulin lispro (ADMELOG) corrective regimen sliding scale   SubCutaneous every 6 hours  insulin regular  human recombinant 9 Unit(s) SubCutaneous <User Schedule>  insulin regular  human recombinant 15 Unit(s) SubCutaneous <User Schedule>  insulin regular  human recombinant 30 Unit(s) SubCutaneous <User Schedule>  levothyroxine 125 MICROGram(s) Oral <User Schedule>  lidocaine   4% Patch 1 Patch Transdermal daily  lidocaine   4% Patch 1 Patch Transdermal daily  lidocaine 2% Viscous 5 milliLiter(s) Mucosal two times a day  melatonin 1 milliGRAM(s) Oral at bedtime  melatonin 5 milliGRAM(s) Oral at bedtime  multivitamin/minerals/iron Oral Solution (CENTRUM) 15 milliLiter(s) Oral daily  nystatin Powder 1 Application(s) Topical every 8 hours  pantoprazole   Suspension 40 milliGRAM(s) Enteral Tube <User Schedule>  petrolatum Ophthalmic Ointment 1 Application(s) Both EYES four times a day  potassium chloride   Solution 20 milliEquivalent(s) Oral daily  predniSONE   Tablet 5 milliGRAM(s) Oral daily  QUEtiapine 12.5 milliGRAM(s) Oral <User Schedule>  silver nitrate Applicator 1 Application(s) Topical once  simethicone 80 milliGRAM(s) Chew four times a day  zinc oxide 40% Paste 1 Application(s) Topical daily      PRN INPATIENT MEDICATION  acetaminophen   Oral Liquid .. 1000 milliGRAM(s) Enteral Tube every 6 hours PRN  benzocaine 20% Spray 1 Spray(s) Topical four times a day PRN  diclofenac sodium 1% Gel 2 Gram(s) Topical four times a day PRN  diphenhydrAMINE Elixir 25 milliGRAM(s) Oral every 6 hours PRN  guaifenesin/dextromethorphan Oral Liquid 10 milliLiter(s) Oral every 6 hours PRN  oxyCODONE    Solution 2.5 milliGRAM(s) Oral every 6 hours PRN  sodium chloride 0.65% Nasal 1 Spray(s) Both Nostrils every 6 hours PRN        VITALS/PHYSICAL EXAM  --------------------------------------------------------------------------------  T(C): 36.7 (12-27-23 @ 12:31), Max: 37 (12-27-23 @ 05:19)  HR: 89 (12-27-23 @ 14:31) (74 - 96)  BP: 110/72 (12-27-23 @ 12:31) (110/72 - 163/77)  RR: 13 (12-27-23 @ 05:19) (13 - 20)  SpO2: 99% (12-27-23 @ 14:31) (99% - 100%)  Wt(kg): --    NAD,  Alert, frail,  WD/ WN/ WG  Total Care Sport bed  HEENT:  NC/AT, EOMI, sclera clear, mucosa moist, throat clear, trach       w/o secretions or peritrach skin irration  Gastrointestinal: soft NT/ND (+)PEG w/o drainage or skin irration  : (+) primafit  Neurology:  nonverbal, no follow commands, paraplegic  Psych: calm/ appropriate  Musculoskeletal:  pROM, no deformities/ contractures   Vascular: BLE equally warm,  no cyanosis, clubbing nor acute ischemia  Skin:  moist w/ good turgor  Sacral stage 4 pressure injury  4cm x 2cm x 0.3cm   moist granular wound bed   palpable but not exposed bone  no blistering   (+) serosanguinous drainage  No odor, erythema, increased warmth, tenderness, induration, fluctuance, nor crepitus            LABS/ CULTURES/ RADIOLOGY:              8.6    10.15 >-----------<  132      [12-27-23 @ 07:19]              30.8     146  |  102  |  34  ----------------------------<  196      [12-27-23 @ 07:21]  3.5   |  33  |  <0.30        Ca     10.8     [12-27-23 @ 07:21]      Mg     1.9     [12-27-23 @ 07:21]      Phos  4.6     [12-27-23 @ 07:21]      CAPILLARY BLOOD GLUCOSE  POCT Blood Glucose.: 221 mg/dL (27 Dec 2023 12:20)  POCT Blood Glucose.: 207 mg/dL (27 Dec 2023 08:59)  POCT Blood Glucose.: 200 mg/dL (27 Dec 2023 06:01)  POCT Blood Glucose.: 126 mg/dL (26 Dec 2023 23:55)  POCT Blood Glucose.: 211 mg/dL (26 Dec 2023 17:16)    A1C with Estimated Average Glucose Result: 7.5 % (10-28-23 @ 07:18)

## 2023-12-27 NOTE — PROGRESS NOTE ADULT - PROBLEM SELECTOR PLAN 8
- s/p Home Levemir 14 units in morning held on admission as noted w/ hypoglycemia   - Enteral feed changed to ZIIBRA diabetic formula; glucose numbers stabilizing  - adjustments made throughout admission per endocrine recs - s/p Home Levemir 14 units in morning held on admission as noted w/ hypoglycemia   - Enteral feed changed to Futubra diabetic formula; glucose numbers stabilizing  - adjustments made throughout admission per endocrine recs

## 2023-12-28 LAB
ANION GAP SERPL CALC-SCNC: 8 MMOL/L — SIGNIFICANT CHANGE UP (ref 5–17)
ANION GAP SERPL CALC-SCNC: 8 MMOL/L — SIGNIFICANT CHANGE UP (ref 5–17)
BUN SERPL-MCNC: 32 MG/DL — HIGH (ref 7–23)
BUN SERPL-MCNC: 32 MG/DL — HIGH (ref 7–23)
CALCIUM SERPL-MCNC: 10.1 MG/DL — SIGNIFICANT CHANGE UP (ref 8.4–10.5)
CALCIUM SERPL-MCNC: 10.1 MG/DL — SIGNIFICANT CHANGE UP (ref 8.4–10.5)
CHLORIDE SERPL-SCNC: 100 MMOL/L — SIGNIFICANT CHANGE UP (ref 96–108)
CHLORIDE SERPL-SCNC: 100 MMOL/L — SIGNIFICANT CHANGE UP (ref 96–108)
CO2 SERPL-SCNC: 31 MMOL/L — SIGNIFICANT CHANGE UP (ref 22–31)
CO2 SERPL-SCNC: 31 MMOL/L — SIGNIFICANT CHANGE UP (ref 22–31)
CREAT SERPL-MCNC: <0.3 MG/DL — LOW (ref 0.5–1.3)
CREAT SERPL-MCNC: <0.3 MG/DL — LOW (ref 0.5–1.3)
EGFR: 113 ML/MIN/1.73M2 — SIGNIFICANT CHANGE UP
EGFR: 113 ML/MIN/1.73M2 — SIGNIFICANT CHANGE UP
GLUCOSE BLDC GLUCOMTR-MCNC: 117 MG/DL — HIGH (ref 70–99)
GLUCOSE BLDC GLUCOMTR-MCNC: 117 MG/DL — HIGH (ref 70–99)
GLUCOSE BLDC GLUCOMTR-MCNC: 142 MG/DL — HIGH (ref 70–99)
GLUCOSE BLDC GLUCOMTR-MCNC: 142 MG/DL — HIGH (ref 70–99)
GLUCOSE BLDC GLUCOMTR-MCNC: 150 MG/DL — HIGH (ref 70–99)
GLUCOSE BLDC GLUCOMTR-MCNC: 150 MG/DL — HIGH (ref 70–99)
GLUCOSE BLDC GLUCOMTR-MCNC: 216 MG/DL — HIGH (ref 70–99)
GLUCOSE BLDC GLUCOMTR-MCNC: 216 MG/DL — HIGH (ref 70–99)
GLUCOSE BLDC GLUCOMTR-MCNC: 222 MG/DL — HIGH (ref 70–99)
GLUCOSE BLDC GLUCOMTR-MCNC: 222 MG/DL — HIGH (ref 70–99)
GLUCOSE SERPL-MCNC: 183 MG/DL — HIGH (ref 70–99)
GLUCOSE SERPL-MCNC: 183 MG/DL — HIGH (ref 70–99)
MAGNESIUM SERPL-MCNC: 1.8 MG/DL — SIGNIFICANT CHANGE UP (ref 1.6–2.6)
MAGNESIUM SERPL-MCNC: 1.8 MG/DL — SIGNIFICANT CHANGE UP (ref 1.6–2.6)
PHOSPHATE SERPL-MCNC: 4.1 MG/DL — SIGNIFICANT CHANGE UP (ref 2.5–4.5)
PHOSPHATE SERPL-MCNC: 4.1 MG/DL — SIGNIFICANT CHANGE UP (ref 2.5–4.5)
POTASSIUM SERPL-MCNC: 4.6 MMOL/L — SIGNIFICANT CHANGE UP (ref 3.5–5.3)
POTASSIUM SERPL-MCNC: 4.6 MMOL/L — SIGNIFICANT CHANGE UP (ref 3.5–5.3)
POTASSIUM SERPL-SCNC: 4.6 MMOL/L — SIGNIFICANT CHANGE UP (ref 3.5–5.3)
POTASSIUM SERPL-SCNC: 4.6 MMOL/L — SIGNIFICANT CHANGE UP (ref 3.5–5.3)
SODIUM SERPL-SCNC: 139 MMOL/L — SIGNIFICANT CHANGE UP (ref 135–145)
SODIUM SERPL-SCNC: 139 MMOL/L — SIGNIFICANT CHANGE UP (ref 135–145)

## 2023-12-28 PROCEDURE — 99232 SBSQ HOSP IP/OBS MODERATE 35: CPT

## 2023-12-28 PROCEDURE — 99232 SBSQ HOSP IP/OBS MODERATE 35: CPT | Mod: FS

## 2023-12-28 RX ORDER — INSULIN HUMAN 100 [IU]/ML
32 INJECTION, SOLUTION SUBCUTANEOUS
Refills: 0 | Status: DISCONTINUED | OUTPATIENT
Start: 2023-12-28 | End: 2024-01-03

## 2023-12-28 RX ADMIN — Medication 5 MILLIGRAM(S): at 21:34

## 2023-12-28 RX ADMIN — Medication 1 TABLET(S): at 05:27

## 2023-12-28 RX ADMIN — INSULIN GLARGINE 19 UNIT(S): 100 INJECTION, SOLUTION SUBCUTANEOUS at 09:07

## 2023-12-28 RX ADMIN — Medication 5 MILLIGRAM(S): at 05:27

## 2023-12-28 RX ADMIN — ESCITALOPRAM OXALATE 10 MILLIGRAM(S): 10 TABLET, FILM COATED ORAL at 09:04

## 2023-12-28 RX ADMIN — Medication 500 MILLIGRAM(S): at 12:49

## 2023-12-28 RX ADMIN — CHLORHEXIDINE GLUCONATE 15 MILLILITER(S): 213 SOLUTION TOPICAL at 05:29

## 2023-12-28 RX ADMIN — INSULIN HUMAN 15 UNIT(S): 100 INJECTION, SOLUTION SUBCUTANEOUS at 12:53

## 2023-12-28 RX ADMIN — NYSTATIN CREAM 1 APPLICATION(S): 100000 CREAM TOPICAL at 21:33

## 2023-12-28 RX ADMIN — Medication 15 MILLILITER(S): at 12:50

## 2023-12-28 RX ADMIN — LIDOCAINE 1 PATCH: 4 CREAM TOPICAL at 19:43

## 2023-12-28 RX ADMIN — Medication 3 MILLILITER(S): at 00:06

## 2023-12-28 RX ADMIN — LIDOCAINE 1 PATCH: 4 CREAM TOPICAL at 12:52

## 2023-12-28 RX ADMIN — SIMETHICONE 80 MILLIGRAM(S): 80 TABLET, CHEWABLE ORAL at 18:23

## 2023-12-28 RX ADMIN — LIDOCAINE 5 MILLILITER(S): 4 CREAM TOPICAL at 18:43

## 2023-12-28 RX ADMIN — Medication 2: at 12:55

## 2023-12-28 RX ADMIN — GABAPENTIN 100 MILLIGRAM(S): 400 CAPSULE ORAL at 21:33

## 2023-12-28 RX ADMIN — Medication 3 MILLILITER(S): at 18:18

## 2023-12-28 RX ADMIN — Medication 3 MILLILITER(S): at 05:22

## 2023-12-28 RX ADMIN — NYSTATIN CREAM 1 APPLICATION(S): 100000 CREAM TOPICAL at 05:28

## 2023-12-28 RX ADMIN — LIDOCAINE 5 MILLILITER(S): 4 CREAM TOPICAL at 05:26

## 2023-12-28 RX ADMIN — Medication 1 APPLICATION(S): at 12:54

## 2023-12-28 RX ADMIN — INSULIN HUMAN 30 UNIT(S): 100 INJECTION, SOLUTION SUBCUTANEOUS at 05:43

## 2023-12-28 RX ADMIN — CHLORHEXIDINE GLUCONATE 1 APPLICATION(S): 213 SOLUTION TOPICAL at 05:28

## 2023-12-28 RX ADMIN — Medication 2 DROP(S): at 12:50

## 2023-12-28 RX ADMIN — Medication 1 TABLET(S): at 18:45

## 2023-12-28 RX ADMIN — AMLODIPINE BESYLATE 10 MILLIGRAM(S): 2.5 TABLET ORAL at 05:27

## 2023-12-28 RX ADMIN — LIDOCAINE 1 PATCH: 4 CREAM TOPICAL at 00:22

## 2023-12-28 RX ADMIN — QUETIAPINE FUMARATE 12.5 MILLIGRAM(S): 200 TABLET, FILM COATED ORAL at 18:23

## 2023-12-28 RX ADMIN — Medication 2: at 18:24

## 2023-12-28 RX ADMIN — Medication 2 DROP(S): at 05:27

## 2023-12-28 RX ADMIN — PHENYLEPHRINE-SHARK LIVER OIL-MINERAL OIL-PETROLATUM RECTAL OINTMENT 1 APPLICATION(S): at 10:35

## 2023-12-28 RX ADMIN — Medication 1 APPLICATION(S): at 18:22

## 2023-12-28 RX ADMIN — INSULIN HUMAN 9 UNIT(S): 100 INJECTION, SOLUTION SUBCUTANEOUS at 18:22

## 2023-12-28 RX ADMIN — SIMETHICONE 80 MILLIGRAM(S): 80 TABLET, CHEWABLE ORAL at 12:49

## 2023-12-28 RX ADMIN — Medication 3 MILLILITER(S): at 12:32

## 2023-12-28 RX ADMIN — SIMETHICONE 80 MILLIGRAM(S): 80 TABLET, CHEWABLE ORAL at 05:27

## 2023-12-28 RX ADMIN — Medication 1 APPLICATION(S): at 05:27

## 2023-12-28 RX ADMIN — CHLORHEXIDINE GLUCONATE 15 MILLILITER(S): 213 SOLUTION TOPICAL at 18:43

## 2023-12-28 RX ADMIN — Medication 125 MICROGRAM(S): at 05:16

## 2023-12-28 RX ADMIN — LIDOCAINE 1 PATCH: 4 CREAM TOPICAL at 12:51

## 2023-12-28 RX ADMIN — PANTOPRAZOLE SODIUM 40 MILLIGRAM(S): 20 TABLET, DELAYED RELEASE ORAL at 09:04

## 2023-12-28 RX ADMIN — Medication 1 MILLIGRAM(S): at 21:34

## 2023-12-28 RX ADMIN — Medication 2 DROP(S): at 18:22

## 2023-12-28 NOTE — PROGRESS NOTE ADULT - PROBLEM SELECTOR PLAN 3
Seen by Dental Service on 11/27 and 12/15  - initially seen to rule out infectious source - no signs on infection seen on exam  - 12/15 reconsulted for extraction - no inpatient extraction, f/u outpt  - 12/18 reached out to dental for dental attending to discuss case with pts daughter, Marysol  - 12/20 updated daughter Marysol, she is aware of discussion and decision between dental resident and attending - that there will be no extraction inpatient and she is likely not going to receive a phone call from dental attending (Dr. Lee) Seen by Dental Service on 11/27 and 12/15  - initially seen to rule out infectious source - no signs on infection seen on exam  - loose teeth noted as prior however do not require inpatient extraction as no immediate concern for dental aspiration absent plans for intubation.

## 2023-12-28 NOTE — SPEAKING VALVE EVALUATION - COMMENTS
Hospital course c/b Delirium for which Seroquel was added and home Lexapro continued. Despite trach change patient remained with persistent air leak.  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 11/18:  Attempted trach collar which was tolerated for over 2.5 hours  however ABG revealed acute respiratory acidosis.  Thus patient placed back on vent.   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  Intermittent abdominal pain throughout hospital course, at times relieved with gas/BM, w/u negative, seen by GI - no recs.  11/27/23 Per ID: CXR with pneumonia; Sputum culture MSSA, pseudomonas; Treat for pneumonia; MR with evidence acute OM; Prolonged hospitalization, now with  Pseudomonas in sputum; "For now would view the Pseudomonas as a trach colonizer and monitor for further fevers" Overall, PNA/tracheitis, bleeding from trach, sacral wound. 12/10 pt completed cipro and keflex for osteo treatment.  12/13 moderate amounts of secretions w/ blood - tranexamic acid neb given with notable improvement.  12/18 with blood tinged secretion again - TXA 250mg neb x2 doses.  12/19 spiked fever to 102 - pancultured, negative w/u. CXR: MPRESSION: Stable small pleural effusions with associated atelectasis.

## 2023-12-28 NOTE — PROGRESS NOTE ADULT - SUBJECTIVE AND OBJECTIVE BOX
seen earlier today     Chief Complaint: Diabetes Mellitus follow up    INTERVAL HX: tolerating tube feeding, aide at bedside       Review of Systems:  General: As above  GI: No nausea, vomiting  Endocrine: no  S&Sx of hypoglycemia    Allergies    penicillin (Unknown)  heparin (Unknown)  Tagamet (Unknown)  pineapple (Unknown)  walnut (Unknown)  Pecan, Filbert, Hazelnut (Unknown)  Lyrica (Unknown)    Intolerances      MEDICATIONS  (STANDING):  albuterol/ipratropium for Nebulization 3 milliLiter(s) Nebulizer every 6 hours  amLODIPine   Tablet 10 milliGRAM(s) Oral daily  artificial  tears Solution 2 Drop(s) Both EYES four times a day  ascorbic acid 500 milliGRAM(s) Oral daily  calcium carbonate    500 mG (Tums) Chewable 1 Tablet(s) Chew every 12 hours  chlorhexidine 0.12% Liquid 15 milliLiter(s) Oral Mucosa every 12 hours  chlorhexidine 4% Liquid 1 Application(s) Topical <User Schedule>  dextrose 50% Injectable 12.5 Gram(s) IV Push once  dextrose 50% Injectable 25 Gram(s) IV Push once  escitalopram 10 milliGRAM(s) Oral <User Schedule>  gabapentin Solution 100 milliGRAM(s) Enteral Tube at bedtime  glucagon  Injectable 1 milliGRAM(s) IntraMuscular once  hemorrhoidal Ointment      hemorrhoidal Ointment 1 Application(s) Rectal daily  insulin glargine Injectable (LANTUS) 19 Unit(s) SubCutaneous every morning  insulin lispro (ADMELOG) corrective regimen sliding scale   SubCutaneous every 6 hours  insulin regular  human recombinant 9 Unit(s) SubCutaneous <User Schedule>  insulin regular  human recombinant 15 Unit(s) SubCutaneous <User Schedule>  insulin regular  human recombinant 30 Unit(s) SubCutaneous <User Schedule>  levothyroxine 125 MICROGram(s) Oral <User Schedule>  lidocaine   4% Patch 1 Patch Transdermal daily  lidocaine   4% Patch 1 Patch Transdermal daily  lidocaine 2% Viscous 5 milliLiter(s) Mucosal two times a day  melatonin 5 milliGRAM(s) Oral at bedtime  melatonin 1 milliGRAM(s) Oral at bedtime  multivitamin/minerals/iron Oral Solution (CENTRUM) 15 milliLiter(s) Oral daily  nystatin Powder 1 Application(s) Topical every 8 hours  pantoprazole   Suspension 40 milliGRAM(s) Enteral Tube <User Schedule>  petrolatum Ophthalmic Ointment 1 Application(s) Both EYES four times a day  predniSONE   Tablet 5 milliGRAM(s) Oral daily  QUEtiapine 12.5 milliGRAM(s) Oral <User Schedule>  silver nitrate Applicator 1 Application(s) Topical once  simethicone 80 milliGRAM(s) Chew four times a day  zinc oxide 40% Paste 1 Application(s) Topical daily        insulin glargine Injectable (LANTUS)   19 Unit(s) SubCutaneous (12-28-23 @ 09:07)    insulin lispro (ADMELOG) corrective regimen sliding scale   2 Unit(s) SubCutaneous (12-27-23 @ 12:43)    insulin regular  human recombinant   9 Unit(s) SubCutaneous (12-27-23 @ 17:48)    insulin regular  human recombinant   15 Unit(s) SubCutaneous (12-27-23 @ 12:44)    insulin regular  human recombinant   30 Unit(s) SubCutaneous (12-28-23 @ 05:43)    levothyroxine   125 MICROGram(s) Oral (12-28-23 @ 05:16)    predniSONE   Tablet   5 milliGRAM(s) Oral (12-28-23 @ 05:27)        PHYSICAL EXAM:  VITALS: T(C): 36.8 (12-28-23 @ 05:25)  T(F): 98.2 (12-28-23 @ 05:25), Max: 98.3 (12-27-23 @ 17:38)  HR: 91 (12-28-23 @ 05:25) (81 - 91)  BP: 150/76 (12-28-23 @ 05:25) (110/72 - 150/76)  RR:  (13 - 18)  SpO2:  (99% - 100%)  Wt(kg): --  GENERAL: NAD  Respiratory: Respirations unlabored   Extremities: Warm, no edema  NEURO: Alert , appropriate     LABS:  POCT Blood Glucose.: 150 mg/dL (12-28-23 @ 08:47)  POCT Blood Glucose.: 142 mg/dL (12-28-23 @ 05:37)  POCT Blood Glucose.: 117 mg/dL (12-28-23 @ 00:11)  POCT Blood Glucose.: 141 mg/dL (12-27-23 @ 17:23)  POCT Blood Glucose.: 221 mg/dL (12-27-23 @ 12:20)  POCT Blood Glucose.: 207 mg/dL (12-27-23 @ 08:59)  POCT Blood Glucose.: 200 mg/dL (12-27-23 @ 06:01)  POCT Blood Glucose.: 126 mg/dL (12-26-23 @ 23:55)  POCT Blood Glucose.: 211 mg/dL (12-26-23 @ 17:16)  POCT Blood Glucose.: 211 mg/dL (12-26-23 @ 11:59)  POCT Blood Glucose.: 201 mg/dL (12-26-23 @ 08:46)  POCT Blood Glucose.: 182 mg/dL (12-26-23 @ 05:41)  POCT Blood Glucose.: 174 mg/dL (12-25-23 @ 23:35)  POCT Blood Glucose.: 212 mg/dL (12-25-23 @ 17:24)  POCT Blood Glucose.: 209 mg/dL (12-25-23 @ 12:00)                          8.6    10.15 )-----------( 132      ( 27 Dec 2023 07:19 )             30.8     12-27    146<H>  |  102  |  34<H>  ----------------------------<  196<H>  3.5   |  33<H>  |  <0.30<L>    Ca    10.8<H>      27 Dec 2023 07:21  Phos  4.6     12-27  Mg     1.9     12-27        11-25 Chol -- Direct LDL -- LDL calculated -- HDL -- Trig 192<H>  Thyroid Function Tests:  12-04 @ 07:01 TSH 1.57 FreeT4 -- T3 60 Anti TPO -- Anti Thyroglobulin Ab -- TSI --      A1C with Estimated Average Glucose Result: 7.5 % (10-28-23 @ 07:18)    Estimated Average Glucose: 169 mg/dL (10-28-23 @ 07:18)        Diet, NPO with Tube Feed:   Tube Feeding Modality: Gastrostomy  Casie Frazier glucose support 1.2  Total Volume for 24 Hours (mL): 1200  Continuous  Starting Tube Feed Rate mL per Hour: 60  Increase Tube Feed Rate by (mL): 0  Until Goal Tube Feed Rate (mL per Hour): 60  Tube Feed Duration (in Hours): 20  Tube Feed Start Time: 06:00  Tube Feed Stop Time: 02:00  Free Water Flush  Pump   Rate (mL per Hour): 10   Frequency: Every 2 Hours  Alfred(7 Gm Arginine/7 Gm Glut/1.2 Gm HMB     Qty per Day:  2 (11-29-23 @ 14:12) [Active]

## 2023-12-28 NOTE — PROGRESS NOTE ADULT - ASSESSMENT
72 y/o F w/h/o T2DM with unknown control due to anemia of chronic disease skewing A1C levels. On Levemir and Humalog insulin PTA. Unknown DM complications. Also h/o HTN, HLD, anemia, hypothyroidism, RA, fibromyalgia, remote cerebral aneurysm repair, acute hypercapnic respiratory failure now with tracheostomy, PEG tube, and bedbound (trach change 8/18, PEG change 8/14), sacral pressure ulcer, BIBEMS from home for 6 day hx of bleeding from the tracheostomy and PEG tube with associated abdominal pain> now resolved. Endocrinology consulted for hyperglycemia. BG Goal 100-180mg/dl       Type 2 diabetes mellitus with hyperglycemia, with long-term current use of insulin.   ·  Plan: - FS BG monitoring [id number] while on TFs/NPO   - FBG at goal continue with  lantus 19 units sc qAM   - Monitor on recently adjusted    Regular insulin 30 units sc at 6am, 15 units sc at 12 noon and 9 units sc at 6pm. PLEASE DO NOT HOLD IF PATIENT IS ON TUBE FEEDS (hold only if TF are stopped). Can decrease/adjust dose if there is a concern for hypoglycemia.   -if noon bg >180 today please increase regular insulin to 32 units at 0600 starting 12/29  - C/w low dose admelog correction scale every 6 hours  - please inform endocrine team if there are any changes in tube feeding( formula or rate)   - Will follow and adjust insulin as needed.    DISCHARGE:  - Will be determined according to BG/insulin needs/TFs and steroid therapy at time of discharge.    - Needs telemedicine as outpatient due to bedbound status. Can follow up at [facility]. [address] [place] [address] [address]. Phone . Make sure pt has Rx for all DM supplies and insulin.     Problem/Plan - 2:  ·  Problem: Secondary adrenal insufficiency.   ·  Plan: - Due to chronic steroid use pt is at risk of tertiary AI, please do not abruptly discontinue or hold steroids as this can result in an adrenal crisis   - c/w prednisone 5mg daily  - hemodynamically stable at present time.     Problem/Plan - 3:  ·  Problem: Hypothyroidism.   ·  Plan: TSH and free thyroxine wnl 11/28/23  Recommendations:   - continue levothyroxine 125mcg daily  - Please make sure LT4 is given on an empty stomach at least 30 mins to 1 hour apart from other meds and food/TFs to ensure med absorption. DO NOT GIVE WITH VITAMINS/ANTACIDS.     Problem/Plan - 4:  ·  Problem: HTN (hypertension).   ·  Plan: - BP goal <130/80  - Manage per primary team.  - currently on amlodipine 10mg daily.     Problem/Plan - 5:  ·  Problem: HLD (hyperlipidemia).   ·  Plan: - LDL goal <70 due to DM  - Outpatient fasting lipid profile   - Consider moderate intensity Statin if not contraindicated and based on risks/benefits for pt  - Manage per primary team        discussed with patient and primary team Sylvain CORONEL   Contact via Microsoft Teams during business hours  To reach covering provider access AMION via sunrise tools  For Urgent matters/after-hours/weekends/holidays please page endocrine fellow on call   For nonurgent matters please email [email or web address]    Please note that this patient may be followed by different provider tomorrow.  Notify endocrine 24 hours prior to discharge for final recommendations              72 y/o F w/h/o T2DM with unknown control due to anemia of chronic disease skewing A1C levels. On Levemir and Humalog insulin PTA. Unknown DM complications. Also h/o HTN, HLD, anemia, hypothyroidism, RA, fibromyalgia, remote cerebral aneurysm repair, acute hypercapnic respiratory failure now with tracheostomy, PEG tube, and bedbound (trach change 8/18, PEG change 8/14), sacral pressure ulcer, BIBEMS from home for 6 day hx of bleeding from the tracheostomy and PEG tube with associated abdominal pain> now resolved. Endocrinology consulted for hyperglycemia. BG Goal 100-180mg/dl       Type 2 diabetes mellitus with hyperglycemia, with long-term current use of insulin.   ·  Plan: - FS BG monitoring [id number] while on TFs/NPO   - FBG at goal continue with  lantus 19 units sc qAM   - Monitor on recently adjusted    Regular insulin 30 units sc at 6am, 15 units sc at 12 noon and 9 units sc at 6pm. PLEASE DO NOT HOLD IF PATIENT IS ON TUBE FEEDS (hold only if TF are stopped). Can decrease/adjust dose if there is a concern for hypoglycemia.   -if noon bg >180 today please increase regular insulin to 32 units at 0600 starting 12/29  - C/w low dose admelog correction scale every 6 hours  - please inform endocrine team if there are any changes in tube feeding( formula or rate)   - Will follow and adjust insulin as needed.    DISCHARGE:  - Will be determined according to BG/insulin needs/TFs and steroid therapy at time of discharge.    - Needs telemedicine as outpatient due to bedbound status. [facility] follow up at [facility]. [address] [place] [address] [address]. Phone . Make sure pt has Rx for all DM supplies and insulin.     Problem/Plan - 2:  ·  Problem: Secondary adrenal insufficiency.   ·  Plan: - Due to chronic steroid use pt is at risk of tertiary AI, please do not abruptly discontinue or hold steroids as this can result in an adrenal crisis   - c/w prednisone 5mg daily  - hemodynamically stable at present time.     Problem/Plan - 3:  ·  Problem: Hypothyroidism.   ·  Plan: TSH and free thyroxine wnl 11/28/23  Recommendations:   - continue levothyroxine 125mcg daily  - Please make sure LT4 is given on an empty stomach at least 30 mins to 1 hour apart from other meds and food/TFs to ensure med absorption. DO NOT GIVE WITH VITAMINS/ANTACIDS.     Problem/Plan - 4:  ·  Problem: HTN (hypertension).   ·  Plan: - BP goal <130/80  - Manage per primary team.  - currently on amlodipine 10mg daily.     Problem/Plan - 5:  ·  Problem: HLD (hyperlipidemia).   ·  Plan: - LDL goal <70 due to DM  - Outpatient fasting lipid profile   - Consider moderate intensity Statin if not contraindicated and based on risks/benefits for pt  - Manage per primary team        discussed with patient and primary team Sylvain CORONEL   Contact via Microsoft Teams during business hours  To reach covering provider access AMION via sunrise tools  For Urgent matters/after-hours/weekends/holidays please page endocrine fellow on call   For nonurgent matters please email [email or web address]    Please note that this patient may be followed by different provider tomorrow.  Notify endocrine 24 hours prior to discharge for final recommendations

## 2023-12-28 NOTE — SPEAKING VALVE EVALUATION - DIAGNOSTIC IMPRESSIONS
---------------------  From: Rosanna Cortes CMA (Phone Messages Pool ([id number]_[facility]))   To: INR Pool ( [id number]_[facility]);     Sent: 12/7/2020 4:12:33 PM CST  Subject: INR [facility]     Phone Message    PCP:   _      Time of Call:  1556       Person Calling:  [facility]  Phone number:  [phone]    Returned call at: _    Note:   Calls for INR result and new Warfarin instructions. Please advise.    Last office visit and reason:  _Spoke with Cedrick and addressed this.   What Type Of Note Output Would You Prefer (Optional)?: Standard Output Hpi Title: Evaluation of Skin Lesions Year Removed: 1900 Consult order received and chart reviewed. SLP attempted to see Pt for assessment in conjunction with respiratory therapy to determine Pt's candidacy for PMV however Pt appears to be drowsy and is frequently closing her eyes; Nsg and Respiratory therapy in agreement to defer assessment. In addition, Per SLP d/w Pt's daughter, assessment also deferred as daughter is requesting Pt's spouse to be present. SLP will reschedule assessment for a later date; D/w MD and BERNA Narayan who were in agreement.

## 2023-12-28 NOTE — PROGRESS NOTE ADULT - PROBLEM SELECTOR PLAN 6
- Home amlodipine held on admission, restarted -- continue monitoring BP   - Echo from 10/17/2020 notable for hyperdynamic L ventricular systolic function, moderately severe LVOT obstruction, and EF 81% (per Wilkinson calculation) vs 77% (per Teichholz calculation) - Echo from 10/17/2020 notable for hyperdynamic L ventricular systolic function, moderately severe LVOT obstruction, and EF 81% (per Wilkinson calculation) vs 77% (per Teichholz calculation)  - Amlodipine 10mg QD

## 2023-12-28 NOTE — SPEAKING VALVE EVALUATION - SLP PERTINENT HISTORY OF CURRENT PROBLEM
72 y/o F with PMHx of T2DM, HTN, HLD, anemia, hypothyroidism, RA, fibromyalgia, remote cerebral aneurysm repair, acute hypercapnic respiratory failure now with tracheostomy, PEG tube, and bedbound (trach change 8/18, PEG change 8/14), sacral pressure ulcer, BIBEMS from home for 6 day hx of bleeding from the tracheostomy and PEG tube with associated abdominal pain, recent history of auditory and visual hallucinations, and with chronic sacral decubitus ulcer. Per MD "PEG tube with scant amount of dried blood, at baseline neurologic status. Imaging showing no active bleeding around tracheostomy site, however was notable for mild thickening along PEG tube tract, sacral ulcer extending to the coccyx suggestive of possible osteomyelitis, and bibasilar patchy lung opacities with volume loss. Patient admitted for respiratory support, wound care of tracheostomy and PEG, and management of sacral osteomyelitis. 74 y/o F with PMHx of T2DM, HTN, HLD, anemia, hypothyroidism, RA, fibromyalgia, remote cerebral aneurysm repair, acute hypercapnic respiratory failure now with tracheostomy, PEG tube, and bedbound (trach change 8/18, PEG change 8/14), sacral pressure ulcer, BIBEMS from home for 6 day hx of bleeding from the tracheostomy and PEG tube with associated abdominal pain, recent history of auditory and visual hallucinations, and with chronic sacral decubitus ulcer. Per MD "PEG tube with scant amount of dried blood, at baseline neurologic status. Imaging showing no active bleeding around tracheostomy site, however was notable for mild thickening along PEG tube tract, sacral ulcer extending to the coccyx suggestive of possible osteomyelitis, and bibasilar patchy lung opacities with volume loss. Patient admitted for respiratory support, wound care of tracheostomy and PEG, and management of sacral osteomyelitis.

## 2023-12-28 NOTE — SPEAKING VALVE EVALUATION - OBSERVATIONS
12/26:  Pt remains medically stable.  Discharge notice given to daughter Marysol.  12/27/23 Pt failed ps 12/5; continue to wean as able. Per GI:  Hx cont: 12/27/23 Per GI: "leaking from chronic PEG site, seems to have resolved; - if precurs, options include lowering the rate of tube feeds +/- a trial of reglan (pt was previously on it but it was stopped due to diarrhea) - cont PPI" Wound care continues to follow re: Sacral stage 4 pressure injury. Per Endocrine: Type 2 diabetes mellitus with hyperglycemia, with long-term current use of insulin; FS BG monitoring [id number] while on TFs/NPO. New consult order received 12/26/23 for PMV assessment for inline use; SLP d/w providers. 12/26:  Pt remains medically stable.  Discharge notice given to daughter Marysol.  12/27/23 Pt failed ps 12/5; continue to wean as able. Per GI:  Hx cont: 12/27/23 Per GI: "leaking from chronic PEG site, seems to have resolved; - if precurs, options include lowering the rate of tube feeds +/- a trial of reglan (pt was previously on it but it was stopped due to diarrhea) - cont PPI" Wound care continues to follow re: Sacral stage 4 pressure injury. Per Endocrine: Type 2 diabetes mellitus with hyperglycemia, with long-term current use of insulin; FS BG monitoring [id number] while on TFs/NPO. New consult order received 12/26/23 for PMV assessment for inline use; SLP d/w providers.

## 2023-12-28 NOTE — PROGRESS NOTE ADULT - ASSESSMENT
74 y/o F with PMHx of T2DM, HTN, HLD, anemia, hypothyroidism, RA, fibromyalgia, remote cerebral aneurysm repair, acute hypercapnic respiratory failure now with tracheostomy, PEG tube, and bedbound (trach change 8/18, PEG change 8/14), sacral pressure ulcer, BIBEMS from home for 6 day hx of bleeding from the tracheostomy and PEG tube with associated abdominal pain, recent history of auditory and visual hallucinations, and with chronic sacral decubitus ulcer. Exam notable for mild abdominal distention with LLQ and RLQ tenderness, tracheostomy in place with no obvious bleeding, PEG tube with scant amount of dried blood, at baseline neurologic status. Imaging showing no active bleeding around tracheostomy site, however was notable for mild thickening along PEG tube tract, sacral ulcer extending to the coccyx suggestive of possible osteomyelitis, and bibasilar patchy lung opacities with volume loss. Patient admitted for respiratory support, wound care of tracheostomy and PEG, and management of sacral osteomyelitis. No further Trach and peg site bleeding noted since admission.  Pelvic MRI imaging also suggestive of Acute OM. Patient placed on long-term antibiotics with Infectious disease guidance.  Additionally treated with a 7d course of Cefepime due to PSA and Serratia tracheitis on 11/8-->11/15 and then resumed cipro/keflex.  Hospital course c/b Delirium for which Seroquel was added and home Lexapro continued. Tracheostomy changed to #9 Cuffed [place] by ENT 11/3.  Despite trach change patient remained with persistent air leak.  On 11/19, the trach was again changed due to leak and loss of volumes on vent.  Trach was changed to #8 distal cuffed [place]iley.  Patient seen by Dermatology for back lesions.  One diagnosed as a seborrheic keratitis and the other a dermatofibroma.  Intermittent abdominal pain throughout hospital course, at times relieved with gas/BM, w/u negative, seen by GI - no recs.  12/10 pt completed cipro and keflex for osteo treatment.  12/13 moderate amounts of secretions w/ blood - tranexamic acid neb given with notable improvement.  12/18 with blood tinged secretion again - TXA 250mg neb x2 doses.  12/19 spiked fever to 102 - pancultured, negative w/u.    12/26:  Pt remains medically stable.  Discharge notice given to daughter Marysol.   12/17 failed ps 12/5   continue to wean as able- PMV in line eval by speech planned tomorrow 72 y/o F with PMHx of T2DM, HTN, HLD, anemia, hypothyroidism, RA, fibromyalgia, remote cerebral aneurysm repair, acute hypercapnic respiratory failure now with tracheostomy, PEG tube, and bedbound (trach change 8/18, PEG change 8/14), sacral pressure ulcer, BIBEMS from home for 6 day hx of bleeding from the tracheostomy and PEG tube with associated abdominal pain, recent history of auditory and visual hallucinations, and with chronic sacral decubitus ulcer. Exam notable for mild abdominal distention with LLQ and RLQ tenderness, tracheostomy in place with no obvious bleeding, PEG tube with scant amount of dried blood, at baseline neurologic status. Imaging showing no active bleeding around tracheostomy site, however was notable for mild thickening along PEG tube tract, sacral ulcer extending to the coccyx suggestive of possible osteomyelitis, and bibasilar patchy lung opacities with volume loss. Patient admitted for respiratory support, wound care of tracheostomy and PEG, and management of sacral osteomyelitis. No further Trach and peg site bleeding noted since admission.  Pelvic MRI imaging also suggestive of Acute OM. Patient placed on long-term antibiotics with Infectious disease guidance.  Additionally treated with a 7d course of Cefepime due to PSA and Serratia tracheitis on 11/8-->11/15 and then resumed cipro/keflex.  Hospital course c/b Delirium for which Seroquel was added and home Lexapro continued. Tracheostomy changed to #9 Cuffed [place] by ENT 11/3.  Despite trach change patient remained with persistent air leak.  On 11/19, the trach was again changed due to leak and loss of volumes on vent.  Trach was changed to #8 distal cuffed Shiley.  Patient seen by Dermatology for back lesions.  One diagnosed as a seborrheic keratitis and the other a dermatofibroma.  Intermittent abdominal pain throughout hospital course, at times relieved with gas/BM, w/u negative, seen by GI - no recs.  12/10 pt completed cipro and keflex for osteo treatment.  12/13 moderate amounts of secretions w/ blood - tranexamic acid neb given with notable improvement.  12/18 with blood tinged secretion again - TXA 250mg neb x2 doses.  12/19 spiked fever to 102 - pancultured, negative w/u.    12/26:  Pt remains medically stable.  Discharge notice given to daughter Marysol.   12/27:  failed ps 12/5   continue to wean as able- PMV in line eval by speech planned tomorrow.  12/28:  No acute events.  Patient responsive without complaints this morning.  Sodium improved with additional free water.  Free water reduced to Q8H.

## 2023-12-28 NOTE — PROGRESS NOTE ADULT - NS ATTEND AMEND GEN_ALL_CORE FT
73F with a h/o DM, HTN, HLD, anemia, hypothyroidism, RA, fibromyalgia, remote cerebral aneurysm with repair, hypercapnic respiratory failure s/p tracheostomy/PEG, bedbound, sacral pressure ulcer. Admitted w/ bleeding from tracheostomy and PEG w/ associated abdominal pain, recent hx of auditory/visual hallucinations.   Imaging showed mild thickening along PEG tube tract and sacral ulcer extending to coccyx suggestive of acute osteomyelitis.      # Osteomyelitis  # Chronic hypoxemic RF  # oropharyngeal dysphagia  # acute on chronic pain  # Trach/Peg bleeding  # Tracheitis with Pseudomonas and serratia  # Hx of hallucination, anxiety     On physical exam she is awake, breathing comfortably on ventilator, tolerating PS trial of 15/5, bilateral breath sounds present, abd soft, non tender non distended, extremities warm and well perfused .    #Neuro - awake, alert, and interactive. moving all extremities, mouthing words. continue current medications  #Cardiovascular - hemodynamically stable  #Pulmonary - chronic hypoxemic respiratory failure, has 8XLT trach, on home vent, pressure support trials daily.   No further hemoptysis after TXA nebs, Minimize suctioning as needed  #Gastro - oropharyngeal dysphagia with PEG tube in place, tolerating feeds, nutrition follow up appreciated   Appreciate GI eval of PEG site, minimal leak, tolerating feeds  #ID - sacral osteo on MRI - wound care follow-up appreciated, c/w offloading and local wound care  -The patient has had this wound since a hospitalization in December 2022 and per daughter does not have chronic or multiple wounds but only this single wound.   - s/p 6 week course of Cipro and Keflex as per ID - completed 12/10/23  - afebrile off abx today   #Hem/Onc - prior cytopenias in setting of antibiotics per patient's daughter, stable Hb today   - Hem/Onc follow-up noted - suspect an anemia of chronic disease  #Pain - continue current pain control - in setting of fibromyalgia and pain from wound  - Voltaren topical, lidocaine patches and low dose Oxycodone as needed  #Derm - previously seen by derm for skin lesions - mid back with irritated seborrheic keratosis - outpatient follow-up  #Endo - DM2 - continue insulin and adjust as needed for goal FS < 180  - Endocrine follow-up appreciated   - Continue Synthroid - TSH WNL  #DVT proph - SCDs.

## 2023-12-28 NOTE — PROGRESS NOTE ADULT - TIME BILLING
review of the medical record, medical decision making as above, discussion w/ interdisciplinary team.

## 2023-12-28 NOTE — PROGRESS NOTE ADULT - SUBJECTIVE AND OBJECTIVE BOX
Patient is a 73y old  Female who presents with a chief complaint of PEG Bleeding (22 Dec 2023 07:35)      Interval Events:    REVIEW OF SYSTEMS:  [ ] Positive  [ ] All other systems negative  [ ] Unable to assess ROS because ________    Vital Signs Last 24 Hrs  T(C): 36.8 (12-28-23 @ 05:25), Max: 36.8 (12-27-23 @ 17:38)  T(F): 98.2 (12-28-23 @ 05:25), Max: 98.3 (12-27-23 @ 17:38)  HR: 91 (12-28-23 @ 05:25) (81 - 91)  BP: 150/76 (12-28-23 @ 05:25) (110/72 - 150/76)  RR: 14 (12-28-23 @ 05:25) (13 - 18)  SpO2: 100% (12-28-23 @ 05:25) (99% - 100%)    PHYSICAL EXAM:  HEENT:   [ ]Tracheostomy:  [ ]Pupils equal  [ ]No oral lesions  [ ]Abnormal    SKIN  [ ]No Rash  [ ] Abnormal  [ ] pressure    CARDIAC  [ ]Regular  [ ]Abnormal    PULMONARY  [ ]Bilateral Clear Breath Sounds  [ ]Normal Excursion  [ ]Abnormal    GI  [ ]PEG      [ ] +BS		              [ ]Soft, nondistended, nontender	  [ ]Abnormal    MUSCULOSKELETAL                                   [ ]Bedbound                 [ ]Abnormal    [ ]Ambulatory/OOB to chair                           EXTREMITIES                                         [ ]Normal  [ ]Edema                           NEUROLOGIC  [ ] Normal, non focal  [ ] Focal findings:    PSYCHIATRIC  [ ]Alert and appropriate  [ ] Sedated	 [ ]Agitated    :  Mcbride: [ ] Yes, if yes: Date of Placement:                   [  ] No    LINES: Central Lines [ ] Yes, if yes: Date of Placement                                     [  ] No    HOSPITAL MEDICATIONS:  MEDICATIONS  (STANDING):  albuterol/ipratropium for Nebulization 3 milliLiter(s) Nebulizer every 6 hours  amLODIPine   Tablet 10 milliGRAM(s) Oral daily  artificial  tears Solution 2 Drop(s) Both EYES four times a day  ascorbic acid 500 milliGRAM(s) Oral daily  calcium carbonate    500 mG (Tums) Chewable 1 Tablet(s) Chew every 12 hours  chlorhexidine 0.12% Liquid 15 milliLiter(s) Oral Mucosa every 12 hours  chlorhexidine 4% Liquid 1 Application(s) Topical <User Schedule>  dextrose 50% Injectable 12.5 Gram(s) IV Push once  dextrose 50% Injectable 25 Gram(s) IV Push once  escitalopram 10 milliGRAM(s) Oral <User Schedule>  gabapentin Solution 100 milliGRAM(s) Enteral Tube at bedtime  glucagon  Injectable 1 milliGRAM(s) IntraMuscular once  hemorrhoidal Ointment 1 Application(s) Rectal daily  hemorrhoidal Ointment      insulin glargine Injectable (LANTUS) 19 Unit(s) SubCutaneous every morning  insulin lispro (ADMELOG) corrective regimen sliding scale   SubCutaneous every 6 hours  insulin regular  human recombinant 9 Unit(s) SubCutaneous <User Schedule>  insulin regular  human recombinant 15 Unit(s) SubCutaneous <User Schedule>  insulin regular  human recombinant 30 Unit(s) SubCutaneous <User Schedule>  levothyroxine 125 MICROGram(s) Oral <User Schedule>  lidocaine   4% Patch 1 Patch Transdermal daily  lidocaine   4% Patch 1 Patch Transdermal daily  lidocaine 2% Viscous 5 milliLiter(s) Mucosal two times a day  melatonin 5 milliGRAM(s) Oral at bedtime  melatonin 1 milliGRAM(s) Oral at bedtime  multivitamin/minerals/iron Oral Solution (CENTRUM) 15 milliLiter(s) Oral daily  nystatin Powder 1 Application(s) Topical every 8 hours  pantoprazole   Suspension 40 milliGRAM(s) Enteral Tube <User Schedule>  petrolatum Ophthalmic Ointment 1 Application(s) Both EYES four times a day  potassium chloride   Solution 20 milliEquivalent(s) Oral daily  predniSONE   Tablet 5 milliGRAM(s) Oral daily  QUEtiapine 12.5 milliGRAM(s) Oral <User Schedule>  silver nitrate Applicator 1 Application(s) Topical once  simethicone 80 milliGRAM(s) Chew four times a day  zinc oxide 40% Paste 1 Application(s) Topical daily    MEDICATIONS  (PRN):  acetaminophen   Oral Liquid .. 1000 milliGRAM(s) Enteral Tube every 6 hours PRN Temp greater or equal to 38C (100.4F), Mild Pain (1 - 3)  benzocaine 20% Spray 1 Spray(s) Topical four times a day PRN Cough  diclofenac sodium 1% Gel 2 Gram(s) Topical four times a day PRN pain  diphenhydrAMINE Elixir 25 milliGRAM(s) Oral every 6 hours PRN Rash and/or Itching  guaifenesin/dextromethorphan Oral Liquid 10 milliLiter(s) Oral every 6 hours PRN Cough  oxyCODONE    Solution 2.5 milliGRAM(s) Oral every 6 hours PRN Moderate Pain (4 - 6)  sodium chloride 0.65% Nasal 1 Spray(s) Both Nostrils every 6 hours PRN Nasal Congestion      LABS:                        8.6    10.15 )-----------( 132      ( 27 Dec 2023 07:19 )             30.8     12-27    146<H>  |  102  |  34<H>  ----------------------------<  196<H>  3.5   |  33<H>  |  <0.30<L>    Ca    10.8<H>      27 Dec 2023 07:21  Phos  4.6     12-27  Mg     1.9     12-27        Urinalysis Basic - ( 27 Dec 2023 07:21 )    Color: x / Appearance: x / SG: x / pH: x  Gluc: 196 mg/dL / Ketone: x  / Bili: x / Urobili: x   Blood: x / Protein: x / Nitrite: x   Leuk Esterase: x / RBC: x / WBC x   Sq Epi: x / Non Sq Epi: x / Bacteria: x          CAPILLARY BLOOD GLUCOSE    MICROBIOLOGY:     RADIOLOGY:  [ ] Reviewed and interpreted by me    Mode: AC/ CMV (Assist Control/ Continuous Mandatory Ventilation)  RR (machine): 12  TV (machine): 300  FiO2: 40  PEEP: 5  ITime: 0.6  MAP: 8  PIP: 27   Patient is a 73y old  Female who presents with a chief complaint of PEG Bleeding (22 Dec 2023 07:35)      Interval Events: No events reported over night.    REVIEW OF SYSTEMS:  [ ] Positive  [X] All other systems negative  [ ] Unable to assess ROS because ___    Vital Signs Last 24 Hrs  T(C): 36.8 (12-28-23 @ 05:25), Max: 36.8 (12-27-23 @ 17:38)  T(F): 98.2 (12-28-23 @ 05:25), Max: 98.3 (12-27-23 @ 17:38)  HR: 91 (12-28-23 @ 05:25) (81 - 91)  BP: 150/76 (12-28-23 @ 05:25) (110/72 - 150/76)  RR: 14 (12-28-23 @ 05:25) (13 - 18)  SpO2: 100% (12-28-23 @ 05:25) (99% - 100%)    PHYSICAL EXAM:  HEENT:   [X]Tracheostomy: #8 distal XLT [place]  [X]Pupils equal  [ ]No oral lesions  [X] Abnormal: missing dentition; +teeth caries; +loose bottom teeth    SKIN  [ ]No Rash  [ ] Abnormal  [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    CARDIAC  [X]Regular  [ ]Abnormal    PULMONARY  [ ]Bilateral Clear Breath Sounds  [ ]Normal Excursion  [X]Abnormal - BL Coarse BS    GI  [X]PEG      [X] +BS		              [X]Soft, nondistended, nontender	  [ ]Abnormal    MUSCULOSKELETAL                                   [X]Bedbound                 [ ]Abnormal    [ ]Ambulatory/OOB to chair                           EXTREMITIES                                         [X]Normal  [ ]Edema                           NEUROLOGIC  [ ] Normal, non focal  [X] Focal findings: opens eyes spontaneously, follows commands on all 4 extremities    PSYCHIATRIC  [X]Alert and appropriate  [ ] Sedated	 [ ]Agitated    :  Mcbride: [ ] Yes, if yes: Date of Placement:                   [X] No    LINES: Central Lines [ ] Yes, if yes: Date of Placement                                     [X] No      HOSPITAL MEDICATIONS:  MEDICATIONS  (STANDING):  albuterol/ipratropium for Nebulization 3 milliLiter(s) Nebulizer every 6 hours  amLODIPine   Tablet 10 milliGRAM(s) Oral daily  artificial  tears Solution 2 Drop(s) Both EYES four times a day  ascorbic acid 500 milliGRAM(s) Oral daily  calcium carbonate    500 mG (Tums) Chewable 1 Tablet(s) Chew every 12 hours  chlorhexidine 0.12% Liquid 15 milliLiter(s) Oral Mucosa every 12 hours  chlorhexidine 4% Liquid 1 Application(s) Topical <User Schedule>  dextrose 50% Injectable 12.5 Gram(s) IV Push once  dextrose 50% Injectable 25 Gram(s) IV Push once  escitalopram 10 milliGRAM(s) Oral <User Schedule>  gabapentin Solution 100 milliGRAM(s) Enteral Tube at bedtime  glucagon  Injectable 1 milliGRAM(s) IntraMuscular once  hemorrhoidal Ointment 1 Application(s) Rectal daily  hemorrhoidal Ointment      insulin glargine Injectable (LANTUS) 19 Unit(s) SubCutaneous every morning  insulin lispro (ADMELOG) corrective regimen sliding scale   SubCutaneous every 6 hours  insulin regular  human recombinant 9 Unit(s) SubCutaneous <User Schedule>  insulin regular  human recombinant 15 Unit(s) SubCutaneous <User Schedule>  insulin regular  human recombinant 30 Unit(s) SubCutaneous <User Schedule>  levothyroxine 125 MICROGram(s) Oral <User Schedule>  lidocaine   4% Patch 1 Patch Transdermal daily  lidocaine   4% Patch 1 Patch Transdermal daily  lidocaine 2% Viscous 5 milliLiter(s) Mucosal two times a day  melatonin 5 milliGRAM(s) Oral at bedtime  melatonin 1 milliGRAM(s) Oral at bedtime  multivitamin/minerals/iron Oral Solution (CENTRUM) 15 milliLiter(s) Oral daily  nystatin Powder 1 Application(s) Topical every 8 hours  pantoprazole   Suspension 40 milliGRAM(s) Enteral Tube <User Schedule>  petrolatum Ophthalmic Ointment 1 Application(s) Both EYES four times a day  potassium chloride   Solution 20 milliEquivalent(s) Oral daily  predniSONE   Tablet 5 milliGRAM(s) Oral daily  QUEtiapine 12.5 milliGRAM(s) Oral <User Schedule>  silver nitrate Applicator 1 Application(s) Topical once  simethicone 80 milliGRAM(s) Chew four times a day  zinc oxide 40% Paste 1 Application(s) Topical daily    MEDICATIONS  (PRN):  acetaminophen   Oral Liquid .. 1000 milliGRAM(s) Enteral Tube every 6 hours PRN Temp greater or equal to 38C (100.4F), Mild Pain (1 - 3)  benzocaine 20% Spray 1 Spray(s) Topical four times a day PRN Cough  diclofenac sodium 1% Gel 2 Gram(s) Topical four times a day PRN pain  diphenhydrAMINE Elixir 25 milliGRAM(s) Oral every 6 hours PRN Rash and/or Itching  guaifenesin/dextromethorphan Oral Liquid 10 milliLiter(s) Oral every 6 hours PRN Cough  oxyCODONE    Solution 2.5 milliGRAM(s) Oral every 6 hours PRN Moderate Pain (4 - 6)  sodium chloride 0.65% Nasal 1 Spray(s) Both Nostrils every 6 hours PRN Nasal Congestion      LABS:                        8.6    10.15 )-----------( 132      ( 27 Dec 2023 07:19 )             30.8     12-27    146<H>  |  102  |  34<H>  ----------------------------<  196<H>  3.5   |  33<H>  |  <0.30<L>    Ca    10.8<H>      27 Dec 2023 07:21  Phos  4.6     12-27  Mg     1.9     12-27        Urinalysis Basic - ( 27 Dec 2023 07:21 )    Color: x / Appearance: x / SG: x / pH: x  Gluc: 196 mg/dL / Ketone: x  / Bili: x / Urobili: x   Blood: x / Protein: x / Nitrite: x   Leuk Esterase: x / RBC: x / WBC x   Sq Epi: x / Non Sq Epi: x / Bacteria: x          CAPILLARY BLOOD GLUCOSE    MICROBIOLOGY:     RADIOLOGY:  [ ] Reviewed and interpreted by me    Mode: AC/ CMV (Assist Control/ Continuous Mandatory Ventilation)  RR (machine): 12  TV (machine): 300  FiO2: 40  PEEP: 5  ITime: 0.6  MAP: 8  PIP: 27

## 2023-12-28 NOTE — PROGRESS NOTE ADULT - PROBLEM SELECTOR PLAN 8
- s/p Home Levemir 14 units in morning held on admission as noted w/ hypoglycemia   - Enteral feed changed to [employer] diabetic formula; glucose numbers stabilizing  - adjustments made throughout admission per endocrine recs - s/p Home Levemir 14 units in morning held on admission as noted w/ hypoglycemia   - Enteral feed changed to [employer] diabetic formula; glucose numbers stabilizing  - adjustments made throughout admission per endocrine recs - Insulin regimen as per Endocrine team  - Endocrine recommendations appreciated.

## 2023-12-29 LAB
BUN SERPL-MCNC: 27 MG/DL — HIGH (ref 7–23)
BUN SERPL-MCNC: 27 MG/DL — HIGH (ref 7–23)
CALCIUM SERPL-MCNC: 9.8 MG/DL — SIGNIFICANT CHANGE UP (ref 8.4–10.5)
CALCIUM SERPL-MCNC: 9.8 MG/DL — SIGNIFICANT CHANGE UP (ref 8.4–10.5)
CHLORIDE SERPL-SCNC: 98 MMOL/L — SIGNIFICANT CHANGE UP (ref 96–108)
CHLORIDE SERPL-SCNC: 98 MMOL/L — SIGNIFICANT CHANGE UP (ref 96–108)
CO2 SERPL-SCNC: 31 MMOL/L — SIGNIFICANT CHANGE UP (ref 22–31)
CO2 SERPL-SCNC: 31 MMOL/L — SIGNIFICANT CHANGE UP (ref 22–31)
CREAT SERPL-MCNC: <0.3 MG/DL — LOW (ref 0.5–1.3)
CREAT SERPL-MCNC: <0.3 MG/DL — LOW (ref 0.5–1.3)
EGFR: 113 ML/MIN/1.73M2 — SIGNIFICANT CHANGE UP
EGFR: 113 ML/MIN/1.73M2 — SIGNIFICANT CHANGE UP
GLUCOSE BLDC GLUCOMTR-MCNC: 117 MG/DL — HIGH (ref 70–99)
GLUCOSE BLDC GLUCOMTR-MCNC: 117 MG/DL — HIGH (ref 70–99)
GLUCOSE BLDC GLUCOMTR-MCNC: 132 MG/DL — HIGH (ref 70–99)
GLUCOSE BLDC GLUCOMTR-MCNC: 132 MG/DL — HIGH (ref 70–99)
GLUCOSE BLDC GLUCOMTR-MCNC: 147 MG/DL — HIGH (ref 70–99)
GLUCOSE BLDC GLUCOMTR-MCNC: 147 MG/DL — HIGH (ref 70–99)
GLUCOSE BLDC GLUCOMTR-MCNC: 156 MG/DL — HIGH (ref 70–99)
GLUCOSE BLDC GLUCOMTR-MCNC: 156 MG/DL — HIGH (ref 70–99)
GLUCOSE BLDC GLUCOMTR-MCNC: 158 MG/DL — HIGH (ref 70–99)
GLUCOSE BLDC GLUCOMTR-MCNC: 158 MG/DL — HIGH (ref 70–99)
GLUCOSE BLDC GLUCOMTR-MCNC: 192 MG/DL — HIGH (ref 70–99)
GLUCOSE BLDC GLUCOMTR-MCNC: 192 MG/DL — HIGH (ref 70–99)
GLUCOSE BLDC GLUCOMTR-MCNC: 231 MG/DL — HIGH (ref 70–99)
GLUCOSE BLDC GLUCOMTR-MCNC: 231 MG/DL — HIGH (ref 70–99)
GLUCOSE SERPL-MCNC: 151 MG/DL — HIGH (ref 70–99)
GLUCOSE SERPL-MCNC: 151 MG/DL — HIGH (ref 70–99)
HCT VFR BLD CALC: 26.6 % — LOW (ref 34.5–45)
HCT VFR BLD CALC: 26.6 % — LOW (ref 34.5–45)
HGB BLD-MCNC: 7.9 G/DL — LOW (ref 11.5–15.5)
HGB BLD-MCNC: 7.9 G/DL — LOW (ref 11.5–15.5)
MAGNESIUM SERPL-MCNC: 2 MG/DL — SIGNIFICANT CHANGE UP (ref 1.6–2.6)
MAGNESIUM SERPL-MCNC: 2 MG/DL — SIGNIFICANT CHANGE UP (ref 1.6–2.6)
MCHC RBC-ENTMCNC: 27.2 PG — SIGNIFICANT CHANGE UP (ref 27–34)
MCHC RBC-ENTMCNC: 27.2 PG — SIGNIFICANT CHANGE UP (ref 27–34)
MCHC RBC-ENTMCNC: 29.7 GM/DL — LOW (ref 32–36)
MCHC RBC-ENTMCNC: 29.7 GM/DL — LOW (ref 32–36)
MCV RBC AUTO: 91.7 FL — SIGNIFICANT CHANGE UP (ref 80–100)
MCV RBC AUTO: 91.7 FL — SIGNIFICANT CHANGE UP (ref 80–100)
NRBC # BLD: 0 /100 WBCS — SIGNIFICANT CHANGE UP (ref 0–0)
NRBC # BLD: 0 /100 WBCS — SIGNIFICANT CHANGE UP (ref 0–0)
PHOSPHATE SERPL-MCNC: 3.8 MG/DL — SIGNIFICANT CHANGE UP (ref 2.5–4.5)
PHOSPHATE SERPL-MCNC: 3.8 MG/DL — SIGNIFICANT CHANGE UP (ref 2.5–4.5)
PLATELET # BLD AUTO: 110 K/UL — LOW (ref 150–400)
PLATELET # BLD AUTO: 110 K/UL — LOW (ref 150–400)
POTASSIUM SERPL-MCNC: 4 MMOL/L — SIGNIFICANT CHANGE UP (ref 3.5–5.3)
POTASSIUM SERPL-MCNC: 4 MMOL/L — SIGNIFICANT CHANGE UP (ref 3.5–5.3)
POTASSIUM SERPL-SCNC: 4 MMOL/L — SIGNIFICANT CHANGE UP (ref 3.5–5.3)
POTASSIUM SERPL-SCNC: 4 MMOL/L — SIGNIFICANT CHANGE UP (ref 3.5–5.3)
RBC # BLD: 2.9 M/UL — LOW (ref 3.8–5.2)
RBC # BLD: 2.9 M/UL — LOW (ref 3.8–5.2)
RBC # FLD: 17.1 % — HIGH (ref 10.3–14.5)
RBC # FLD: 17.1 % — HIGH (ref 10.3–14.5)
SODIUM SERPL-SCNC: 137 MMOL/L — SIGNIFICANT CHANGE UP (ref 135–145)
SODIUM SERPL-SCNC: 137 MMOL/L — SIGNIFICANT CHANGE UP (ref 135–145)
WBC # BLD: 6.37 K/UL — SIGNIFICANT CHANGE UP (ref 3.8–10.5)
WBC # BLD: 6.37 K/UL — SIGNIFICANT CHANGE UP (ref 3.8–10.5)
WBC # FLD AUTO: 6.37 K/UL — SIGNIFICANT CHANGE UP (ref 3.8–10.5)
WBC # FLD AUTO: 6.37 K/UL — SIGNIFICANT CHANGE UP (ref 3.8–10.5)

## 2023-12-29 PROCEDURE — 99232 SBSQ HOSP IP/OBS MODERATE 35: CPT

## 2023-12-29 PROCEDURE — 99232 SBSQ HOSP IP/OBS MODERATE 35: CPT | Mod: FS

## 2023-12-29 RX ADMIN — Medication 2 DROP(S): at 23:42

## 2023-12-29 RX ADMIN — Medication 1 APPLICATION(S): at 18:26

## 2023-12-29 RX ADMIN — NYSTATIN CREAM 1 APPLICATION(S): 100000 CREAM TOPICAL at 15:37

## 2023-12-29 RX ADMIN — LIDOCAINE 1 PATCH: 4 CREAM TOPICAL at 12:44

## 2023-12-29 RX ADMIN — SIMETHICONE 80 MILLIGRAM(S): 80 TABLET, CHEWABLE ORAL at 00:36

## 2023-12-29 RX ADMIN — Medication 2 DROP(S): at 18:26

## 2023-12-29 RX ADMIN — Medication 3 MILLILITER(S): at 17:58

## 2023-12-29 RX ADMIN — PHENYLEPHRINE-SHARK LIVER OIL-MINERAL OIL-PETROLATUM RECTAL OINTMENT 1 APPLICATION(S): at 10:46

## 2023-12-29 RX ADMIN — OXYCODONE HYDROCHLORIDE 2.5 MILLIGRAM(S): 5 TABLET ORAL at 07:46

## 2023-12-29 RX ADMIN — Medication 1 TABLET(S): at 05:53

## 2023-12-29 RX ADMIN — INSULIN HUMAN 15 UNIT(S): 100 INJECTION, SOLUTION SUBCUTANEOUS at 12:46

## 2023-12-29 RX ADMIN — Medication 3 MILLILITER(S): at 23:32

## 2023-12-29 RX ADMIN — Medication 1 MILLIGRAM(S): at 22:01

## 2023-12-29 RX ADMIN — SIMETHICONE 80 MILLIGRAM(S): 80 TABLET, CHEWABLE ORAL at 05:53

## 2023-12-29 RX ADMIN — INSULIN GLARGINE 19 UNIT(S): 100 INJECTION, SOLUTION SUBCUTANEOUS at 08:49

## 2023-12-29 RX ADMIN — Medication 15 MILLILITER(S): at 12:44

## 2023-12-29 RX ADMIN — LIDOCAINE 5 MILLILITER(S): 4 CREAM TOPICAL at 05:55

## 2023-12-29 RX ADMIN — CHLORHEXIDINE GLUCONATE 15 MILLILITER(S): 213 SOLUTION TOPICAL at 18:25

## 2023-12-29 RX ADMIN — Medication 1: at 23:42

## 2023-12-29 RX ADMIN — Medication 2 DROP(S): at 05:54

## 2023-12-29 RX ADMIN — Medication 3 MILLILITER(S): at 06:23

## 2023-12-29 RX ADMIN — INSULIN HUMAN 32 UNIT(S): 100 INJECTION, SOLUTION SUBCUTANEOUS at 05:56

## 2023-12-29 RX ADMIN — CHLORHEXIDINE GLUCONATE 1 APPLICATION(S): 213 SOLUTION TOPICAL at 05:54

## 2023-12-29 RX ADMIN — LIDOCAINE 1 PATCH: 4 CREAM TOPICAL at 21:06

## 2023-12-29 RX ADMIN — INSULIN HUMAN 9 UNIT(S): 100 INJECTION, SOLUTION SUBCUTANEOUS at 18:46

## 2023-12-29 RX ADMIN — Medication 1 TABLET(S): at 18:30

## 2023-12-29 RX ADMIN — Medication 1 APPLICATION(S): at 12:49

## 2023-12-29 RX ADMIN — PANTOPRAZOLE SODIUM 40 MILLIGRAM(S): 20 TABLET, DELAYED RELEASE ORAL at 08:49

## 2023-12-29 RX ADMIN — Medication 125 MICROGRAM(S): at 05:53

## 2023-12-29 RX ADMIN — LIDOCAINE 5 MILLILITER(S): 4 CREAM TOPICAL at 18:30

## 2023-12-29 RX ADMIN — GABAPENTIN 100 MILLIGRAM(S): 400 CAPSULE ORAL at 22:00

## 2023-12-29 RX ADMIN — Medication 1 APPLICATION(S): at 00:36

## 2023-12-29 RX ADMIN — SIMETHICONE 80 MILLIGRAM(S): 80 TABLET, CHEWABLE ORAL at 12:45

## 2023-12-29 RX ADMIN — Medication 2: at 12:45

## 2023-12-29 RX ADMIN — NYSTATIN CREAM 1 APPLICATION(S): 100000 CREAM TOPICAL at 22:01

## 2023-12-29 RX ADMIN — Medication 5 MILLIGRAM(S): at 22:01

## 2023-12-29 RX ADMIN — CHLORHEXIDINE GLUCONATE 15 MILLILITER(S): 213 SOLUTION TOPICAL at 05:52

## 2023-12-29 RX ADMIN — Medication 5 MILLIGRAM(S): at 05:53

## 2023-12-29 RX ADMIN — NYSTATIN CREAM 1 APPLICATION(S): 100000 CREAM TOPICAL at 05:54

## 2023-12-29 RX ADMIN — Medication 3 MILLILITER(S): at 00:06

## 2023-12-29 RX ADMIN — Medication 2 DROP(S): at 00:38

## 2023-12-29 RX ADMIN — ZINC OXIDE 1 APPLICATION(S): 200 OINTMENT TOPICAL at 10:48

## 2023-12-29 RX ADMIN — SIMETHICONE 80 MILLIGRAM(S): 80 TABLET, CHEWABLE ORAL at 18:25

## 2023-12-29 RX ADMIN — LIDOCAINE 1 PATCH: 4 CREAM TOPICAL at 00:16

## 2023-12-29 RX ADMIN — Medication 1 APPLICATION(S): at 23:42

## 2023-12-29 RX ADMIN — Medication 500 MILLIGRAM(S): at 12:45

## 2023-12-29 RX ADMIN — AMLODIPINE BESYLATE 10 MILLIGRAM(S): 2.5 TABLET ORAL at 05:53

## 2023-12-29 RX ADMIN — Medication 2 DROP(S): at 12:38

## 2023-12-29 RX ADMIN — Medication 1: at 05:55

## 2023-12-29 RX ADMIN — LIDOCAINE 1 PATCH: 4 CREAM TOPICAL at 12:43

## 2023-12-29 RX ADMIN — Medication 3 MILLILITER(S): at 11:05

## 2023-12-29 RX ADMIN — Medication 1 APPLICATION(S): at 05:53

## 2023-12-29 RX ADMIN — SIMETHICONE 80 MILLIGRAM(S): 80 TABLET, CHEWABLE ORAL at 23:41

## 2023-12-29 RX ADMIN — QUETIAPINE FUMARATE 12.5 MILLIGRAM(S): 200 TABLET, FILM COATED ORAL at 18:25

## 2023-12-29 RX ADMIN — Medication 1: at 00:36

## 2023-12-29 NOTE — PROGRESS NOTE ADULT - SUBJECTIVE AND OBJECTIVE BOX
Chief Complaint:  Patient is a 73y old  Female who presents with a chief complaint of PEG Bleeding (22 Dec 2023 07:35)      Date of service 12-29-23 @ 16:29      Interval Events:   per RN scant leakage around PEG tube    Hospital Medications:  acetaminophen   Oral Liquid .. 1000 milliGRAM(s) Enteral Tube every 6 hours PRN  albuterol/ipratropium for Nebulization 3 milliLiter(s) Nebulizer every 6 hours  amLODIPine   Tablet 10 milliGRAM(s) Oral daily  artificial  tears Solution 2 Drop(s) Both EYES four times a day  ascorbic acid 500 milliGRAM(s) Oral daily  benzocaine 20% Spray 1 Spray(s) Topical four times a day PRN  calcium carbonate    500 mG (Tums) Chewable 1 Tablet(s) Chew every 12 hours  chlorhexidine 0.12% Liquid 15 milliLiter(s) Oral Mucosa every 12 hours  chlorhexidine 4% Liquid 1 Application(s) Topical <User Schedule>  dextrose 50% Injectable 12.5 Gram(s) IV Push once  dextrose 50% Injectable 25 Gram(s) IV Push once  diclofenac sodium 1% Gel 2 Gram(s) Topical four times a day PRN  diphenhydrAMINE Elixir 25 milliGRAM(s) Oral every 6 hours PRN  escitalopram 10 milliGRAM(s) Oral <User Schedule>  fentaNYL   Patch  25 MICROgram(s)/Hr 1 Patch Transdermal every 72 hours  gabapentin Solution 100 milliGRAM(s) Enteral Tube at bedtime  glucagon  Injectable 1 milliGRAM(s) IntraMuscular once  guaifenesin/dextromethorphan Oral Liquid 10 milliLiter(s) Oral every 6 hours PRN  hemorrhoidal Ointment 1 Application(s) Rectal daily  hemorrhoidal Ointment      insulin glargine Injectable (LANTUS) 19 Unit(s) SubCutaneous every morning  insulin lispro (ADMELOG) corrective regimen sliding scale   SubCutaneous every 6 hours  insulin regular  human recombinant 15 Unit(s) SubCutaneous <User Schedule>  insulin regular  human recombinant 32 Unit(s) SubCutaneous <User Schedule>  insulin regular  human recombinant 9 Unit(s) SubCutaneous <User Schedule>  levothyroxine 125 MICROGram(s) Oral <User Schedule>  lidocaine   4% Patch 1 Patch Transdermal daily  lidocaine   4% Patch 1 Patch Transdermal daily  lidocaine 2% Viscous 5 milliLiter(s) Mucosal two times a day  melatonin 5 milliGRAM(s) Oral at bedtime  melatonin 1 milliGRAM(s) Oral at bedtime  multivitamin/minerals/iron Oral Solution (CENTRUM) 15 milliLiter(s) Oral daily  nystatin Powder 1 Application(s) Topical every 8 hours  oxyCODONE    Solution 2.5 milliGRAM(s) Oral every 6 hours PRN  pantoprazole   Suspension 40 milliGRAM(s) Enteral Tube <User Schedule>  petrolatum Ophthalmic Ointment 1 Application(s) Both EYES four times a day  predniSONE   Tablet 5 milliGRAM(s) Oral daily  QUEtiapine 12.5 milliGRAM(s) Oral <User Schedule>  silver nitrate Applicator 1 Application(s) Topical once  simethicone 80 milliGRAM(s) Chew four times a day  sodium chloride 0.65% Nasal 1 Spray(s) Both Nostrils every 6 hours PRN  zinc oxide 40% Paste 1 Application(s) Topical daily        Review of Systems:  unable to obtain     PHYSICAL EXAM:   Vital Signs:  Vital Signs Last 24 Hrs  T(C): 36.8 (29 Dec 2023 11:32), Max: 37.2 (28 Dec 2023 18:16)  T(F): 98.2 (29 Dec 2023 11:32), Max: 98.9 (28 Dec 2023 18:16)  HR: 77 (29 Dec 2023 13:01) (77 - 92)  BP: 114/53 (29 Dec 2023 13:01) (104/50 - 144/79)  BP(mean): --  RR: 18 (29 Dec 2023 13:01) (18 - 18)  SpO2: 99% (29 Dec 2023 13:01) (98% - 100%)    Parameters below as of 29 Dec 2023 13:01  Patient On (Oxygen Delivery Method): ventilator      Daily     Daily       PHYSICAL EXAM:     GENERAL:  Appears stated age, well-groomed, well-nourished, no distress  HEENT:  NC/AT,  conjunctivae anicteric, clear and pink,   NECK: supple, trachea midline  CHEST:  Full & symmetric excursion, no increased effort, breath sounds clear  HEART:  Regular rhythm, no JVD  ABDOMEN:  Soft, non-tender, non-distended, normoactive bowel sounds,  no masses , no hepatosplenomegaly  EXTREMITIES:  no cyanosis,clubbing or edema  SKIN:  No rash, erythema, or, ecchymoses, no jaundice  NEURO:  Alert, non-focal, no asterixis  PSYCH: Appropriate affect, oriented to place and time  RECTAL: Deferred      LABS Personally reviewed by me:                        7.9    6.37  )-----------( 110      ( 29 Dec 2023 06:46 )             26.6     Mean Cell Volume: 91.7 fl (12-29-23 @ 06:46)    12-29    137  |  98  |  27<H>  ----------------------------<  151<H>  4.0   |  31  |  <0.30<L>    Ca    9.8      29 Dec 2023 06:46  Phos  3.8     12-29  Mg     2.0     12-29          Urinalysis Basic - ( 29 Dec 2023 06:46 )    Color: x / Appearance: x / SG: x / pH: x  Gluc: 151 mg/dL / Ketone: x  / Bili: x / Urobili: x   Blood: x / Protein: x / Nitrite: x   Leuk Esterase: x / RBC: x / WBC x   Sq Epi: x / Non Sq Epi: x / Bacteria: x                              7.9    6.37  )-----------( 110      ( 29 Dec 2023 06:46 )             26.6                         8.6    10.15 )-----------( 132      ( 27 Dec 2023 07:19 )             30.8       Imaging personally reviewed by me:

## 2023-12-29 NOTE — PROGRESS NOTE ADULT - ASSESSMENT
72 y/o F w/h/o T2DM with unknown control due to anemia of chronic disease skewing A1C levels. On Levemir and Humalog insulin PTA. Unknown DM complications. Also h/o HTN, HLD, anemia, hypothyroidism, RA, fibromyalgia, remote cerebral aneurysm repair, acute hypercapnic respiratory failure now with tracheostomy, PEG tube, and bedbound (trach change 8/18, PEG change 8/14), sacral pressure ulcer, BIBEMS from home for 6 day hx of bleeding from the tracheostomy and PEG tube with associated abdominal pain> now resolved. Endocrinology consulted for hyperglycemia. BG Goal 100-180mg/dl   Tolerating tube feeding at goal ( 60 ml, 6am- 2 am ), Hyperglycemia ( BGs in 200s yesterday at noon and at 6pm) and 6 am  today. 6 pm BGs variable without pattern.         Type 2 diabetes mellitus with hyperglycemia, with long-term current use of insulin.   ·  Plan:   - FS BG monitoring [id number] while on TFs/NPO   - FBG at goal continue with  lantus 19 units sc qAM   - Monitor on adjusted regimen yesterday- Regular insulin 32units sc at 6am, 15 units sc at 12 noon and 9 units sc at 6pm. PLEASE DO NOT HOLD IF PATIENT IS ON TUBE FEEDS (hold only if TF are stopped). Can decrease/adjust dose if there is a concern for hypoglycemia.   - C/w low dose admelog correction scale every 6 hours  - please inform endocrine team if there are any changes in tube feeding( formula or rate)   - Will follow and adjust insulin as needed.    DISCHARGE:  - Will be determined according to BG/insulin needs/TFs and steroid therapy at time of discharge.    - Needs telemedicine as outpatient due to bedbound status. Can follow up at [facility]. [address] [place] [address] [address]. Phone . Make sure pt has Rx for all DM supplies and insulin.     Problem/Plan - 2:  ·  Problem: Secondary adrenal insufficiency.   ·  Plan: - Due to chronic steroid use pt is at risk of tertiary AI, please do not abruptly discontinue or hold steroids as this can result in an adrenal crisis   - c/w prednisone 5mg daily  - hemodynamically stable at present time.     Problem/Plan - 3:  ·  Problem: Hypothyroidism.   ·  Plan: TSH and free thyroxine wnl 11/28/23  Recommendations:   - continue levothyroxine 125mcg daily  - Please make sure LT4 is given on an empty stomach at least 30 mins to 1 hour apart from other meds and food/TFs to ensure med absorption. DO NOT GIVE WITH VITAMINS/ANTACIDS.     Problem/Plan - 4:  ·  Problem: HTN (hypertension).   ·  Plan: - BP goal <130/80  - Manage per primary team.  - currently on amlodipine 10mg daily.     Problem/Plan - 5:  ·  Problem: HLD (hyperlipidemia).   ·  Plan: - LDL goal <70 due to DM  - Outpatient fasting lipid profile   - Consider moderate intensity Statin if not contraindicated and based on risks/benefits for pt  - Manage per primary team        discussed with pt and  at bedside  Contact via [employer] Teams during business hours  To reach covering provider access AMION via sunrise tools  For Urgent matters/after-hours/weekends/holidays please page endocrine fellow on call   For nonurgent matters please email [email or web address]    Please note that this patient may be followed by different provider tomorrow.  Notify endocrine 24 hours prior to discharge for final recommendations              74 y/o F w/h/o T2DM with unknown control due to anemia of chronic disease skewing A1C levels. On Levemir and Humalog insulin PTA. Unknown DM complications. Also h/o HTN, HLD, anemia, hypothyroidism, RA, fibromyalgia, remote cerebral aneurysm repair, acute hypercapnic respiratory failure now with tracheostomy, PEG tube, and bedbound (trach change 8/18, PEG change 8/14), sacral pressure ulcer, BIBEMS from home for 6 day hx of bleeding from the tracheostomy and PEG tube with associated abdominal pain> now resolved. Endocrinology consulted for hyperglycemia. BG Goal 100-180mg/dl   Tolerating tube feeding at goal ( 60 ml, 6am- 2 am ), Hyperglycemia ( BGs in 200s yesterday at noon and at 6pm) and 6 am  today. 6 pm BGs variable without pattern.         Type 2 diabetes mellitus with hyperglycemia, with long-term current use of insulin.   ·  Plan:   - FS BG monitoring [id number] while on TFs/NPO   - FBG at goal continue with  lantus 19 units sc qAM   - Monitor on adjusted regimen yesterday- Regular insulin 32units sc at 6am, 15 units sc at 12 noon and 9 units sc at 6pm. PLEASE DO NOT HOLD IF PATIENT IS ON TUBE FEEDS (hold only if TF are stopped). Can decrease/adjust dose if there is a concern for hypoglycemia.   - C/w low dose admelog correction scale every 6 hours  - please inform endocrine team if there are any changes in tube feeding( formula or rate)   - Will follow and adjust insulin as needed.    DISCHARGE:  - Will be determined according to BG/insulin needs/TFs and steroid therapy at time of discharge.    - Needs telemedicine as outpatient due to bedbound status. Can follow up at [facility]. [address] [place] [address] [address]. Phone . Make sure pt has Rx for all DM supplies and insulin.     Problem/Plan - 2:  ·  Problem: Secondary adrenal insufficiency.   ·  Plan: - Due to chronic steroid use pt is at risk of tertiary AI, please do not abruptly discontinue or hold steroids as this can result in an adrenal crisis   - c/w prednisone 5mg daily  - hemodynamically stable at present time.     Problem/Plan - 3:  ·  Problem: Hypothyroidism.   ·  Plan: TSH and free thyroxine wnl 11/28/23  Recommendations:   - continue levothyroxine 125mcg daily  - Please make sure LT4 is given on an empty stomach at least 30 mins to 1 hour apart from other meds and food/TFs to ensure med absorption. DO NOT GIVE WITH VITAMINS/ANTACIDS.     Problem/Plan - 4:  ·  Problem: HTN (hypertension).   ·  Plan: - BP goal <130/80  - Manage per primary team.  - currently on amlodipine 10mg daily.     Problem/Plan - 5:  ·  Problem: HLD (hyperlipidemia).   ·  Plan: - LDL goal <70 due to DM  - Outpatient fasting lipid profile   - Consider moderate intensity Statin if not contraindicated and based on risks/benefits for pt  - Manage per primary team        discussed with pt and  at bedside  Contact via Microsoft Teams during business hours  To reach covering provider access AMION via sunrise tools  For Urgent matters/after-hours/weekends/holidays please page endocrine fellow on call   For nonurgent matters please email [email or web address]    Please note that this patient may be followed by different provider tomorrow.  Notify endocrine 24 hours prior to discharge for final recommendations

## 2023-12-29 NOTE — PROGRESS NOTE ADULT - ASSESSMENT
74 y/o F with PMHx of T2DM, HTN, HLD, anemia, hypothyroidism, RA, fibromyalgia, remote cerebral aneurysm repair, acute hypercapnic respiratory failure now with tracheostomy, PEG tube, and bedbound (trach change 8/18, PEG change 8/14), sacral pressure ulcer, BIBEMS from home for 6 day hx of bleeding from the tracheostomy and PEG tube with associated abdominal pain, recent history of auditory and visual hallucinations, and with chronic sacral decubitus ulcer. Exam notable for mild abdominal distention with LLQ and RLQ tenderness, tracheostomy in place with no obvious bleeding, PEG tube with scant amount of dried blood, at baseline neurologic status. Imaging showing no active bleeding around tracheostomy site, however was notable for mild thickening along PEG tube tract, sacral ulcer extending to the coccyx suggestive of possible osteomyelitis, and bibasilar patchy lung opacities with volume loss. Patient admitted for respiratory support, wound care of tracheostomy and PEG, and management of sacral osteomyelitis. No further Trach and peg site bleeding noted since admission.  Pelvic MRI imaging also suggestive of Acute OM. Patient placed on long-term antibiotics with Infectious disease guidance.  Additionally treated with a 7d course of Cefepime due to PSA and Serratia tracheitis on 11/8-->11/15 and then resumed cipro/keflex.  Hospital course c/b Delirium for which Seroquel was added and home Lexapro continued. Tracheostomy changed to #9 Cuffed [place] by ENT 11/3.  Despite trach change patient remained with persistent air leak.  On 11/19, the trach was again changed due to leak and loss of volumes on vent.  Trach was changed to #8 distal cuffed [place].  Patient seen by Dermatology for back lesions.  One diagnosed as a seborrheic keratitis and the other a dermatofibroma.  Intermittent abdominal pain throughout hospital course, at times relieved with gas/BM, w/u negative, seen by GI - no recs.  12/10 pt completed cipro and keflex for osteo treatment.  12/13 moderate amounts of secretions w/ blood - tranexamic acid neb given with notable improvement.  12/18 with blood tinged secretion again - TXA 250mg neb x2 doses.  12/19 spiked fever to 102 - pancultured, negative w/u.    12/26:  Pt remains medically stable.  Discharge notice given to daughter Marysol.   12/27:  failed ps 12/5   continue to wean as able- PMV in line eval by speech planned tomorrow.  12/28:  No acute events.  Patient responsive without complaints this morning.  Sodium improved with additional free water.  Free water reduced to Q8H. 72 y/o F with PMHx of T2DM, HTN, HLD, anemia, hypothyroidism, RA, fibromyalgia, remote cerebral aneurysm repair, acute hypercapnic respiratory failure now with tracheostomy, PEG tube, and bedbound (trach change 8/18, PEG change 8/14), sacral pressure ulcer, BIBEMS from home for 6 day hx of bleeding from the tracheostomy and PEG tube with associated abdominal pain, recent history of auditory and visual hallucinations, and with chronic sacral decubitus ulcer. Exam notable for mild abdominal distention with LLQ and RLQ tenderness, tracheostomy in place with no obvious bleeding, PEG tube with scant amount of dried blood, at baseline neurologic status. Imaging showing no active bleeding around tracheostomy site, however was notable for mild thickening along PEG tube tract, sacral ulcer extending to the coccyx suggestive of possible osteomyelitis, and bibasilar patchy lung opacities with volume loss. Patient admitted for respiratory support, wound care of tracheostomy and PEG, and management of sacral osteomyelitis. No further Trach and peg site bleeding noted since admission.  Pelvic MRI imaging also suggestive of Acute OM. Patient placed on long-term antibiotics with Infectious disease guidance.  Additionally treated with a 7d course of Cefepime due to PSA and Serratia tracheitis on 11/8-->11/15 and then resumed cipro/keflex.  Hospital course c/b Delirium for which Seroquel was added and home Lexapro continued. Tracheostomy changed to #9 Cuffed [place] by ENT 11/3.  Despite trach change patient remained with persistent air leak.  On 11/19, the trach was again changed due to leak and loss of volumes on vent.  Trach was changed to #8 distal cuffed [place].  Patient seen by Dermatology for back lesions.  One diagnosed as a seborrheic keratitis and the other a dermatofibroma.  Intermittent abdominal pain throughout hospital course, at times relieved with gas/BM, w/u negative, seen by GI - no recs.  12/10 pt completed cipro and keflex for osteo treatment.  12/13 moderate amounts of secretions w/ blood - tranexamic acid neb given with notable improvement.  12/18 with blood tinged secretion again - TXA 250mg neb x2 doses.  12/19 spiked fever to 102 - pancultured, negative w/u.    12/28:  No acute events.  Patient responsive without complaints this morning.  Sodium improved with additional free water.  Free water reduced to Q8H.  Unable to tolerate inline speaking valve however was able to phonate with cuff partially deflated.  Will encourage use of partial cuff deflation for language communicate as patient tolerates.

## 2023-12-29 NOTE — PROGRESS NOTE ADULT - ASSESSMENT
73 year old female with respiratory failure s/p trach and PEG, sacral osteomyelitis.     leaking from chronic PEG site, minimal   - continue meds to gravity   - cont PPI      I had a prolonged conversation with the patients daughter regarding the hospital course, differential diagnosis, results of diagnostic tests this far, and therapeutic modalities available. Plan of care discussed with the patient after the evaluation. Patient expresses a clear understanding of the plan of care.    Andrew Cleaning D.O.  Gastroenterology and Hepatology  [address] [place] [address], [facility] [address]  [place], [place]  Office: [phone]   73 year old female with respiratory failure s/p trach and PEG, sacral osteomyelitis.     leaking from chronic PEG site, minimal   - continue meds to gravity   - cont PPI      I had a prolonged conversation with the patients daughter regarding the hospital course, differential diagnosis, results of diagnostic tests this far, and therapeutic modalities available. Plan of care discussed with the patient after the evaluation. Patient expresses a clear understanding of the plan of care.    Andrew Cleaning D.O.  Gastroenterology and Hepatology  [address] [place] [address], [facility] [address]  [place], [place]  Office: [phone]

## 2023-12-29 NOTE — PROGRESS NOTE ADULT - PROBLEM SELECTOR PLAN 3
Seen by Dental Service on 11/27 and 12/15  - initially seen to rule out infectious source - no signs on infection seen on exam  - loose teeth noted as prior however do not require inpatient extraction as no immediate concern for dental aspiration absent plans for intubation.

## 2023-12-29 NOTE — SPEAKING VALVE EVALUATION - SLP PERTINENT HISTORY OF CURRENT PROBLEM
74 y/o F with PMHx of T2DM, HTN, HLD, anemia, hypothyroidism, RA, fibromyalgia, remote cerebral aneurysm repair, acute hypercapnic respiratory failure now with tracheostomy, PEG tube, and bedbound (trach change 8/18, PEG change 8/14), sacral pressure ulcer, BIBEMS from home for 6 day hx of bleeding from the tracheostomy and PEG tube with associated abdominal pain, recent history of auditory and visual hallucinations, and with chronic sacral decubitus ulcer. Per MD "PEG tube with scant amount of dried blood, at baseline neurologic status. Imaging showing no active bleeding around tracheostomy site, however was notable for mild thickening along PEG tube tract, sacral ulcer extending to the coccyx suggestive of possible osteomyelitis, and bibasilar patchy lung opacities with volume loss. Patient admitted for respiratory support, wound care of tracheostomy and PEG, and management of sacral osteomyelitis. 72 y/o F with PMHx of T2DM, HTN, HLD, anemia, hypothyroidism, RA, fibromyalgia, remote cerebral aneurysm repair, acute hypercapnic respiratory failure now with tracheostomy, PEG tube, and bedbound (trach change 8/18, PEG change 8/14), sacral pressure ulcer, BIBEMS from home for 6 day hx of bleeding from the tracheostomy and PEG tube with associated abdominal pain, recent history of auditory and visual hallucinations, and with chronic sacral decubitus ulcer. Per MD "PEG tube with scant amount of dried blood, at baseline neurologic status. Imaging showing no active bleeding around tracheostomy site, however was notable for mild thickening along PEG tube tract, sacral ulcer extending to the coccyx suggestive of possible osteomyelitis, and bibasilar patchy lung opacities with volume loss. Patient admitted for respiratory support, wound care of tracheostomy and PEG, and management of sacral osteomyelitis.

## 2023-12-29 NOTE — SPEAKING VALVE EVALUATION - RECOMMENDATIONS
PMV placement is deferred at this time. PMV placement is deferred at this time. Encourage use of AAC board to augment Pt's attempt to mouth words. Suggest staff continues to anticipate Pt's needs and wants. Speech language therapy post d/c; SLP to f/u on inpt basis 1 x per week. Maintain aspiration precaution for Pt' own secretions.

## 2023-12-29 NOTE — PROGRESS NOTE ADULT - SUBJECTIVE AND OBJECTIVE BOX
Seen earlier today     Chief Complaint: Diabetes Mellitus follow up    INTERVAL HX: Tolerating tube feeding at goal. No vomiting or diarrhea as per RN. BGs variable in 100s-200s ( 6 am , BGs in 200s yesterday noon and at 6pm) no changes in tube feeding ( [employer] glucose support 1.2 at 60 ml from 2 am to 2 am ). + trach and PEG,     Review of Systems:  General: As above  GI: No nausea, vomiting  Endocrine: no  S&Sx of hypoglycemia    Allergies    penicillin (Unknown)  heparin (Unknown)  Tagamet (Unknown)  pineapple (Unknown)  walnut (Unknown)  Pecan, Filbert, Hazelnut (Unknown)  Lyrica (Unknown)    Intolerances      MEDICATIONS  (STANDING):  amLODIPine   Tablet 10 milliGRAM(s) Oral daily  dextrose 50% Injectable 12.5 Gram(s) IV Push once  dextrose 50% Injectable 25 Gram(s) IV Push once  gabapentin Solution 100 milliGRAM(s) Enteral Tube at bedtime  glucagon  Injectable 1 milliGRAM(s) IntraMuscular once  insulin glargine Injectable (LANTUS) 19 Unit(s) SubCutaneous every morning  insulin lispro (ADMELOG) corrective regimen sliding scale   SubCutaneous every 6 hours  insulin regular  human recombinant 9 Unit(s) SubCutaneous <User Schedule>  insulin regular  human recombinant 32 Unit(s) SubCutaneous <User Schedule>  insulin regular  human recombinant 15 Unit(s) SubCutaneous <User Schedule>  levothyroxine 125 MICROGram(s) Oral <User Schedule>  predniSONE   Tablet 5 milliGRAM(s) Oral daily      insulin glargine Injectable (LANTUS)   19 Unit(s) SubCutaneous (12-29-23 @ 08:49)    insulin lispro (ADMELOG) corrective regimen sliding scale   1 Unit(s) SubCutaneous (12-29-23 @ 05:55)   1 Unit(s) SubCutaneous (12-29-23 @ 00:36)   2 Unit(s) SubCutaneous (12-28-23 @ 18:24)   2 Unit(s) SubCutaneous (12-28-23 @ 12:55)    insulin regular  human recombinant   15 Unit(s) SubCutaneous (12-28-23 @ 12:53)    insulin regular  human recombinant   32 Unit(s) SubCutaneous (12-29-23 @ 05:56)    insulin regular  human recombinant   9 Unit(s) SubCutaneous (12-28-23 @ 18:22)    levothyroxine   125 MICROGram(s) Oral (12-29-23 @ 05:53)    predniSONE   Tablet   5 milliGRAM(s) Oral (12-29-23 @ 05:53)        PHYSICAL EXAM:  VITALS: T(C): 36.8 (12-29-23 @ 11:32)  T(F): 98.2 (12-29-23 @ 11:32), Max: 98.9 (12-28-23 @ 18:16)  HR: 82 (12-29-23 @ 11:32) (81 - 92)  BP: 104/50 (12-29-23 @ 11:32) (104/50 - 144/79)  RR:  (18 - 18)  SpO2:  (98% - 100%)  Wt(kg): --  GENERAL: in NAD  Respiratory: Respirations unlabored   Extremities: Warm, no edema  NEURO: Alert , appropriate     LABS:  POCT Blood Glucose.: 231 mg/dL (12-29-23 @ 12:41)  POCT Blood Glucose.: 147 mg/dL (12-29-23 @ 08:47)  POCT Blood Glucose.: 158 mg/dL (12-29-23 @ 05:38)  POCT Blood Glucose.: 192 mg/dL (12-29-23 @ 00:13)  POCT Blood Glucose.: 216 mg/dL (12-28-23 @ 17:43)  POCT Blood Glucose.: 222 mg/dL (12-28-23 @ 12:31)  POCT Blood Glucose.: 150 mg/dL (12-28-23 @ 08:47)  POCT Blood Glucose.: 142 mg/dL (12-28-23 @ 05:37)  POCT Blood Glucose.: 117 mg/dL (12-28-23 @ 00:11)  POCT Blood Glucose.: 141 mg/dL (12-27-23 @ 17:23)  POCT Blood Glucose.: 221 mg/dL (12-27-23 @ 12:20)  POCT Blood Glucose.: 207 mg/dL (12-27-23 @ 08:59)  POCT Blood Glucose.: 200 mg/dL (12-27-23 @ 06:01)  POCT Blood Glucose.: 126 mg/dL (12-26-23 @ 23:55)  POCT Blood Glucose.: 211 mg/dL (12-26-23 @ 17:16)                          7.9    6.37  )-----------( 110      ( 29 Dec 2023 06:46 )             26.6     12-29    137  |  98  |  27<H>  ----------------------------<  151<H>  4.0   |  31  |  <0.30<L>    Ca    9.8      29 Dec 2023 06:46  Phos  3.8     12-29  Mg     2.0     12-29      eGFR: 113 mL/min/1.73m2 (29 Dec 2023 06:46)    11-25 Chol -- Direct LDL -- LDL calculated -- HDL -- Trig 192<H>  Thyroid Function Tests:  12-04 @ 07:01 TSH 1.57 FreeT4 -- T3 60 Anti TPO -- Anti Thyroglobulin Ab -- TSI --      A1C with Estimated Average Glucose Result: 7.5 % (10-28-23 @ 07:18)    Estimated Average Glucose: 169 mg/dL (10-28-23 @ 07:18)        Diet, NPO with Tube Feed:   Tube Feeding Modality: Gastrostomy  [employer] glucose support 1.2  Total Volume for 24 Hours (mL): 1200  Continuous  Starting Tube Feed Rate mL per Hour: 60  Increase Tube Feed Rate by (mL): 0  Until Goal Tube Feed Rate (mL per Hour): 60  Tube Feed Duration (in Hours): 20  Tube Feed Start Time: 06:00  Tube Feed Stop Time: 02:00  Free Water Flush  Pump   Rate (mL per Hour): 10   Frequency: Every 2 Hours  Alfred(7 Gm Arginine/7 Gm Glut/1.2 Gm HMB     Qty per Day:  2 (11-29-23 @ 14:12) [Active]             Seen earlier today     Chief Complaint: Diabetes Mellitus follow up    INTERVAL HX: Tolerating tube feeding at goal. No vomiting or diarrhea as per RN. BGs variable in 100s-200s ( 6 am , BGs in 200s yesterday noon and at 6pm) no changes in tube feeding ( [employer] glucose support 1.2 at 60 ml from 2 am to 2 am ). + trach and PEG,     Review of Systems:  General: As above  GI: No nausea, vomiting  Endocrine: no  S&Sx of hypoglycemia    Allergies    penicillin (Unknown)  heparin (Unknown)  Tagamet (Unknown)  pineapple (Unknown)  walnut (Unknown)  Pecan, Filbert, Hazelnut (Unknown)  Lyrica (Unknown)    Intolerances      MEDICATIONS  (STANDING):  amLODIPine   Tablet 10 milliGRAM(s) Oral daily  dextrose 50% Injectable 12.5 Gram(s) IV Push once  dextrose 50% Injectable 25 Gram(s) IV Push once  gabapentin Solution 100 milliGRAM(s) Enteral Tube at bedtime  glucagon  Injectable 1 milliGRAM(s) IntraMuscular once  insulin glargine Injectable (LANTUS) 19 Unit(s) SubCutaneous every morning  insulin lispro (ADMELOG) corrective regimen sliding scale   SubCutaneous every 6 hours  insulin regular  human recombinant 9 Unit(s) SubCutaneous <User Schedule>  insulin regular  human recombinant 32 Unit(s) SubCutaneous <User Schedule>  insulin regular  human recombinant 15 Unit(s) SubCutaneous <User Schedule>  levothyroxine 125 MICROGram(s) Oral <User Schedule>  predniSONE   Tablet 5 milliGRAM(s) Oral daily      insulin glargine Injectable (LANTUS)   19 Unit(s) SubCutaneous (12-29-23 @ 08:49)    insulin lispro (ADMELOG) corrective regimen sliding scale   1 Unit(s) SubCutaneous (12-29-23 @ 05:55)   1 Unit(s) SubCutaneous (12-29-23 @ 00:36)   2 Unit(s) SubCutaneous (12-28-23 @ 18:24)   2 Unit(s) SubCutaneous (12-28-23 @ 12:55)    insulin regular  human recombinant   15 Unit(s) SubCutaneous (12-28-23 @ 12:53)    insulin regular  human recombinant   32 Unit(s) SubCutaneous (12-29-23 @ 05:56)    insulin regular  human recombinant   9 Unit(s) SubCutaneous (12-28-23 @ 18:22)    levothyroxine   125 MICROGram(s) Oral (12-29-23 @ 05:53)    predniSONE   Tablet   5 milliGRAM(s) Oral (12-29-23 @ 05:53)        PHYSICAL EXAM:  VITALS: T(C): 36.8 (12-29-23 @ 11:32)  T(F): 98.2 (12-29-23 @ 11:32), Max: 98.9 (12-28-23 @ 18:16)  HR: 82 (12-29-23 @ 11:32) (81 - 92)  BP: 104/50 (12-29-23 @ 11:32) (104/50 - 144/79)  RR:  (18 - 18)  SpO2:  (98% - 100%)  Wt(kg): --  GENERAL: in NAD  Respiratory: Respirations unlabored   Extremities: Warm, no edema  NEURO: Alert , appropriate     LABS:  POCT Blood Glucose.: 231 mg/dL (12-29-23 @ 12:41)  POCT Blood Glucose.: 147 mg/dL (12-29-23 @ 08:47)  POCT Blood Glucose.: 158 mg/dL (12-29-23 @ 05:38)  POCT Blood Glucose.: 192 mg/dL (12-29-23 @ 00:13)  POCT Blood Glucose.: 216 mg/dL (12-28-23 @ 17:43)  POCT Blood Glucose.: 222 mg/dL (12-28-23 @ 12:31)  POCT Blood Glucose.: 150 mg/dL (12-28-23 @ 08:47)  POCT Blood Glucose.: 142 mg/dL (12-28-23 @ 05:37)  POCT Blood Glucose.: 117 mg/dL (12-28-23 @ 00:11)  POCT Blood Glucose.: 141 mg/dL (12-27-23 @ 17:23)  POCT Blood Glucose.: 221 mg/dL (12-27-23 @ 12:20)  POCT Blood Glucose.: 207 mg/dL (12-27-23 @ 08:59)  POCT Blood Glucose.: 200 mg/dL (12-27-23 @ 06:01)  POCT Blood Glucose.: 126 mg/dL (12-26-23 @ 23:55)  POCT Blood Glucose.: 211 mg/dL (12-26-23 @ 17:16)                          7.9    6.37  )-----------( 110      ( 29 Dec 2023 06:46 )             26.6     12-29    137  |  98  |  27<H>  ----------------------------<  151<H>  4.0   |  31  |  <0.30<L>    Ca    9.8      29 Dec 2023 06:46  Phos  3.8     12-29  Mg     2.0     12-29      eGFR: 113 mL/min/1.73m2 (29 Dec 2023 06:46)    11-25 Chol -- Direct LDL -- LDL calculated -- HDL -- Trig 192<H>  Thyroid Function Tests:  12-04 @ 07:01 TSH 1.57 FreeT4 -- T3 60 Anti TPO -- Anti Thyroglobulin Ab -- TSI --      A1C with Estimated Average Glucose Result: 7.5 % (10-28-23 @ 07:18)    Estimated Average Glucose: 169 mg/dL (10-28-23 @ 07:18)        Diet, NPO with Tube Feed:   Tube Feeding Modality: Gastrostomy  [employer] glucose support 1.2  Total Volume for 24 Hours (mL): 1200  Continuous  Starting Tube Feed Rate mL per Hour: 60  Increase Tube Feed Rate by (mL): 0  Until Goal Tube Feed Rate (mL per Hour): 60  Tube Feed Duration (in Hours): 20  Tube Feed Start Time: 06:00  Tube Feed Stop Time: 02:00  Free Water Flush  Pump   Rate (mL per Hour): 10   Frequency: Every 2 Hours  Alfred(7 Gm Arginine/7 Gm Glut/1.2 Gm HMB     Qty per Day:  2 (11-29-23 @ 14:12) [Active]

## 2023-12-29 NOTE — PROGRESS NOTE ADULT - PROBLEM SELECTOR PLAN 4
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  - Tolerates intermittent PSV 15/5 during day/Vent HS  - Airway Clearance: Duoneb, Hypersal, Chest PT, PRN suctioning   - Maintain 02 sat > 92%    Tracheal Bleeding (Intermittent)-->resolved since admission   - S/p CT Angio neck: No evidence of active bleeding around tracheostomy site. No hematoma.  No collection   - Seen by ENT; Kath and b/l bronchi visualized without any trauma, small amount of blood tinged secretions resting at beginning of right bronchus not actively bleeding.  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  - Vent:  volume /12/40%/+5  - Tolerates intermittent PSV 15/5 during day/Vent HS  - Airway Clearance: Duoneb, Hypersal, Chest PT, PRN suctioning   - 12/29:  Unable to tolerate inline speaking valve.  Was able to phonate with partial cuff deflation thus encouraged to be used when family present ot facilitate communication.    Tracheal Bleeding (Intermittent)-->resolved since admission   - S/p CT Angio neck: No evidence of active bleeding around tracheostomy site. No hematoma.  No collection   - Seen by ENT; Car/vero and b/l bronchi visualized without any trauma, small amount of blood tinged secretions resting at beginning of right bronchus not actively bleeding.  - 12/13 tracheal bleeding upon suctioning - tranexamic neb given  - 12/18 tranexamic neb x2 doses - improved

## 2023-12-29 NOTE — PROGRESS NOTE ADULT - SUBJECTIVE AND OBJECTIVE BOX
Patient is a 73y old  Female who presents with a chief complaint of PEG Bleeding (22 Dec 2023 07:35)      Interval Events:    REVIEW OF SYSTEMS:  [ ] Positive  [ ] All other systems negative  [ ] Unable to assess ROS because ________    Vital Signs Last 24 Hrs  T(C): 36.9 (12-29-23 @ 03:00), Max: 37.2 (12-28-23 @ 18:16)  T(F): 98.4 (12-29-23 @ 03:00), Max: 98.9 (12-28-23 @ 18:16)  HR: 84 (12-29-23 @ 06:26) (84 - 92)  BP: 144/79 (12-29-23 @ 03:00) (112/57 - 144/79)  RR: 18 (12-29-23 @ 03:00) (16 - 18)  SpO2: 100% (12-29-23 @ 06:26) (98% - 100%)    PHYSICAL EXAM:  HEENT:   [ ]Tracheostomy:  [ ]Pupils equal  [ ]No oral lesions  [ ]Abnormal    SKIN  [ ]No Rash  [ ] Abnormal  [ ] pressure    CARDIAC  [ ]Regular  [ ]Abnormal    PULMONARY  [ ]Bilateral Clear Breath Sounds  [ ]Normal Excursion  [ ]Abnormal    GI  [ ]PEG      [ ] +BS		              [ ]Soft, nondistended, nontender	  [ ]Abnormal    MUSCULOSKELETAL                                   [ ]Bedbound                 [ ]Abnormal    [ ]Ambulatory/OOB to chair                           EXTREMITIES                                         [ ]Normal  [ ]Edema                           NEUROLOGIC  [ ] Normal, non focal  [ ] Focal findings:    PSYCHIATRIC  [ ]Alert and appropriate  [ ] Sedated	 [ ]Agitated    :  Mcbride: [ ] Yes, if yes: Date of Placement:                   [  ] No    LINES: Central Lines [ ] Yes, if yes: Date of Placement                                     [  ] No    HOSPITAL MEDICATIONS:  MEDICATIONS  (STANDING):  albuterol/ipratropium for Nebulization 3 milliLiter(s) Nebulizer every 6 hours  amLODIPine   Tablet 10 milliGRAM(s) Oral daily  artificial  tears Solution 2 Drop(s) Both EYES four times a day  ascorbic acid 500 milliGRAM(s) Oral daily  calcium carbonate    500 mG (Tums) Chewable 1 Tablet(s) Chew every 12 hours  chlorhexidine 0.12% Liquid 15 milliLiter(s) Oral Mucosa every 12 hours  chlorhexidine 4% Liquid 1 Application(s) Topical <User Schedule>  dextrose 50% Injectable 25 Gram(s) IV Push once  dextrose 50% Injectable 12.5 Gram(s) IV Push once  escitalopram 10 milliGRAM(s) Oral <User Schedule>  fentaNYL   Patch  25 MICROgram(s)/Hr 1 Patch Transdermal every 72 hours  gabapentin Solution 100 milliGRAM(s) Enteral Tube at bedtime  glucagon  Injectable 1 milliGRAM(s) IntraMuscular once  hemorrhoidal Ointment 1 Application(s) Rectal daily  hemorrhoidal Ointment      insulin glargine Injectable (LANTUS) 19 Unit(s) SubCutaneous every morning  insulin lispro (ADMELOG) corrective regimen sliding scale   SubCutaneous every 6 hours  insulin regular  human recombinant 9 Unit(s) SubCutaneous <User Schedule>  insulin regular  human recombinant 32 Unit(s) SubCutaneous <User Schedule>  insulin regular  human recombinant 15 Unit(s) SubCutaneous <User Schedule>  levothyroxine 125 MICROGram(s) Oral <User Schedule>  lidocaine   4% Patch 1 Patch Transdermal daily  lidocaine   4% Patch 1 Patch Transdermal daily  lidocaine 2% Viscous 5 milliLiter(s) Mucosal two times a day  melatonin 1 milliGRAM(s) Oral at bedtime  melatonin 5 milliGRAM(s) Oral at bedtime  multivitamin/minerals/iron Oral Solution (CENTRUM) 15 milliLiter(s) Oral daily  nystatin Powder 1 Application(s) Topical every 8 hours  pantoprazole   Suspension 40 milliGRAM(s) Enteral Tube <User Schedule>  petrolatum Ophthalmic Ointment 1 Application(s) Both EYES four times a day  predniSONE   Tablet 5 milliGRAM(s) Oral daily  QUEtiapine 12.5 milliGRAM(s) Oral <User Schedule>  silver nitrate Applicator 1 Application(s) Topical once  simethicone 80 milliGRAM(s) Chew four times a day  zinc oxide 40% Paste 1 Application(s) Topical daily    MEDICATIONS  (PRN):  acetaminophen   Oral Liquid .. 1000 milliGRAM(s) Enteral Tube every 6 hours PRN Temp greater or equal to 38C (100.4F), Mild Pain (1 - 3)  benzocaine 20% Spray 1 Spray(s) Topical four times a day PRN Cough  diclofenac sodium 1% Gel 2 Gram(s) Topical four times a day PRN pain  diphenhydrAMINE Elixir 25 milliGRAM(s) Oral every 6 hours PRN Rash and/or Itching  guaifenesin/dextromethorphan Oral Liquid 10 milliLiter(s) Oral every 6 hours PRN Cough  oxyCODONE    Solution 2.5 milliGRAM(s) Oral every 6 hours PRN Moderate Pain (4 - 6)  sodium chloride 0.65% Nasal 1 Spray(s) Both Nostrils every 6 hours PRN Nasal Congestion      LABS:    12-28    139  |  100  |  32<H>  ----------------------------<  183<H>  4.6   |  31  |  <0.30<L>    Ca    10.1      28 Dec 2023 11:40  Phos  4.1     12-28  Mg     1.8     12-28        Urinalysis Basic - ( 28 Dec 2023 11:40 )    Color: x / Appearance: x / SG: x / pH: x  Gluc: 183 mg/dL / Ketone: x  / Bili: x / Urobili: x   Blood: x / Protein: x / Nitrite: x   Leuk Esterase: x / RBC: x / WBC x   Sq Epi: x / Non Sq Epi: x / Bacteria: x          CAPILLARY BLOOD GLUCOSE    MICROBIOLOGY:     RADIOLOGY:  [ ] Reviewed and interpreted by me    Mode: AC/ CMV (Assist Control/ Continuous Mandatory Ventilation)  RR (machine): 12  TV (machine): 300  FiO2: 40  PEEP: 5  ITime: 1  MAP: 8  PIP: 25   Patient is a 73y old  Female who presents with a chief complaint of PEG Bleeding (22 Dec 2023 07:35)      Interval Events: No events reported over night    REVIEW OF SYSTEMS:  [ ] Positive  [X] All other systems negative  [] Unable to assess ROS because ________    Vital Signs Last 24 Hrs  T(C): 36.9 (12-29-23 @ 03:00), Max: 37.2 (12-28-23 @ 18:16)  T(F): 98.4 (12-29-23 @ 03:00), Max: 98.9 (12-28-23 @ 18:16)  HR: 84 (12-29-23 @ 06:26) (84 - 92)  BP: 144/79 (12-29-23 @ 03:00) (112/57 - 144/79)  RR: 18 (12-29-23 @ 03:00) (16 - 18)  SpO2: 100% (12-29-23 @ 06:26) (98% - 100%)      PHYSICAL EXAM:  HEENT:   [X] Tracheostomy: #8 distal XLT Shiley  [X] PERRL B/L; EOMI  [ ]No oral lesions  [X] Abnormal: missing dentition; +teeth caries; +loose bottom teeth    SKIN  [ ]No Rash  [ ] Abnormal  [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    CARDIAC  [X] Regular  [ ] Abnormal    PULMONARY  [ ] Bilateral Clear Breath Sounds  [ ] Normal Excursion  [X] Abnormal - BL Coarse BS    GI  [X] PEG      [X] +BS		              [X] Soft, nondistended, nontender	  [ ] Abnormal    MUSCULOSKELETAL                                   [X] Bedbound                 [ ] Abnormal    [ ] Ambulatory/OOB to chair                           EXTREMITIES                                         [X] Normal  [ ]Edema                           NEUROLOGIC  [ ] Normal, non focal  [X] Focal findings: awake and arousable to voice, follows commands on all 4 extremities; limited mobility of legs with partial B/L foot drop    PSYCHIATRIC  [X]Alert and appropriate  [ ] Sedated	 [ ]Agitated    :  Mcbride: [ ] Yes, if yes: Date of Placement:                   [X] No    LINES: Central Lines [ ] Yes, if yes: Date of Placement                                     [X] No        HOSPITAL MEDICATIONS:  MEDICATIONS  (STANDING):  albuterol/ipratropium for Nebulization 3 milliLiter(s) Nebulizer every 6 hours  amLODIPine   Tablet 10 milliGRAM(s) Oral daily  artificial  tears Solution 2 Drop(s) Both EYES four times a day  ascorbic acid 500 milliGRAM(s) Oral daily  calcium carbonate    500 mG (Tums) Chewable 1 Tablet(s) Chew every 12 hours  chlorhexidine 0.12% Liquid 15 milliLiter(s) Oral Mucosa every 12 hours  chlorhexidine 4% Liquid 1 Application(s) Topical <User Schedule>  dextrose 50% Injectable 25 Gram(s) IV Push once  dextrose 50% Injectable 12.5 Gram(s) IV Push once  escitalopram 10 milliGRAM(s) Oral <User Schedule>  fentaNYL   Patch  25 MICROgram(s)/Hr 1 Patch Transdermal every 72 hours  gabapentin Solution 100 milliGRAM(s) Enteral Tube at bedtime  glucagon  Injectable 1 milliGRAM(s) IntraMuscular once  hemorrhoidal Ointment 1 Application(s) Rectal daily  hemorrhoidal Ointment      insulin glargine Injectable (LANTUS) 19 Unit(s) SubCutaneous every morning  insulin lispro (ADMELOG) corrective regimen sliding scale   SubCutaneous every 6 hours  insulin regular  human recombinant 9 Unit(s) SubCutaneous <User Schedule>  insulin regular  human recombinant 32 Unit(s) SubCutaneous <User Schedule>  insulin regular  human recombinant 15 Unit(s) SubCutaneous <User Schedule>  levothyroxine 125 MICROGram(s) Oral <User Schedule>  lidocaine   4% Patch 1 Patch Transdermal daily  lidocaine   4% Patch 1 Patch Transdermal daily  lidocaine 2% Viscous 5 milliLiter(s) Mucosal two times a day  melatonin 1 milliGRAM(s) Oral at bedtime  melatonin 5 milliGRAM(s) Oral at bedtime  multivitamin/minerals/iron Oral Solution (CENTRUM) 15 milliLiter(s) Oral daily  nystatin Powder 1 Application(s) Topical every 8 hours  pantoprazole   Suspension 40 milliGRAM(s) Enteral Tube <User Schedule>  petrolatum Ophthalmic Ointment 1 Application(s) Both EYES four times a day  predniSONE   Tablet 5 milliGRAM(s) Oral daily  QUEtiapine 12.5 milliGRAM(s) Oral <User Schedule>  silver nitrate Applicator 1 Application(s) Topical once  simethicone 80 milliGRAM(s) Chew four times a day  zinc oxide 40% Paste 1 Application(s) Topical daily    MEDICATIONS  (PRN):  acetaminophen   Oral Liquid .. 1000 milliGRAM(s) Enteral Tube every 6 hours PRN Temp greater or equal to 38C (100.4F), Mild Pain (1 - 3)  benzocaine 20% Spray 1 Spray(s) Topical four times a day PRN Cough  diclofenac sodium 1% Gel 2 Gram(s) Topical four times a day PRN pain  diphenhydrAMINE Elixir 25 milliGRAM(s) Oral every 6 hours PRN Rash and/or Itching  guaifenesin/dextromethorphan Oral Liquid 10 milliLiter(s) Oral every 6 hours PRN Cough  oxyCODONE    Solution 2.5 milliGRAM(s) Oral every 6 hours PRN Moderate Pain (4 - 6)  sodium chloride 0.65% Nasal 1 Spray(s) Both Nostrils every 6 hours PRN Nasal Congestion      LABS:                          7.9    6.37  )-----------( 110      ( 29 Dec 2023 06:46 )             26.6       12-29    137  |  98  |  27<H>  ----------------------------<  151<H>  4.0   |  31  |  <0.30<L>    Ca    9.8      29 Dec 2023 06:46  Phos  3.8     12-29  Mg     2.0     12-29      CAPILLARY BLOOD GLUCOSE    MICROBIOLOGY:     RADIOLOGY:  [ ] Reviewed and interpreted by me    Mode: AC/ CMV (Assist Control/ Continuous Mandatory Ventilation)  RR (machine): 12  TV (machine): 300  FiO2: 40  PEEP: 5  ITime: 1  MAP: 8  PIP: 25

## 2023-12-29 NOTE — SPEAKING VALVE EVALUATION - COMMENTS
Hospital course c/b Delirium for which Seroquel was added and home Lexapro continued. Despite trach change patient remained with persistent air leak.  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 11/18:  Attempted trach collar which was tolerated for over 2.5 hours  however ABG revealed acute respiratory acidosis.  Thus patient placed back on vent.   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  Intermittent abdominal pain throughout hospital course, at times relieved with gas/BM, w/u negative, seen by GI - no recs.  11/27/23 Per ID: CXR with pneumonia; Sputum culture MSSA, pseudomonas; Treat for pneumonia; MR with evidence acute OM; Prolonged hospitalization, now with  Pseudomonas in sputum; "For now would view the Pseudomonas as a trach colonizer and monitor for further fevers" Overall, PNA/tracheitis, bleeding from trach, sacral wound. 12/10 pt completed cipro and keflex for osteo treatment.  12/13 moderate amounts of secretions w/ blood - tranexamic acid neb given with notable improvement.  12/18 with blood tinged secretion again - TXA 250mg neb x2 doses.  12/19 spiked fever to 102 - pancultured, negative w/u. CXR: MPRESSION: Stable small pleural effusions with associated atelectasis. Hospital course c/b Delirium for which Seroquel was added and home Lexapro continued. Despite trach change patient remained with persistent air leak.  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 11/18:  Attempted trach collar which was tolerated for over 2.5 hours  however ABG revealed acute respiratory acidosis.  Thus patient placed back on vent.   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  Intermittent abdominal pain throughout hospital course, at times relieved with gas/BM, w/u negative, seen by GI - no recs.  11/27/23 Per ID: CXR with pneumonia; Sputum culture MSSA, pseudomonas; Treat for pneumonia; MR with evidence acute OM; Prolonged hospitalization, now with  Pseudomonas in sputum; "For now would view the Pseudomonas as a trach colonizer and monitor for further fevers" Overall, PNA/tracheitis, bleeding from trach, sacral wound. 12/10 pt completed cipro and keflex for osteo treatment.  12/13 moderate amounts of secretions w/ blood - tranexamic acid neb given with notable improvement.  12/18 with blood tinged secretion again - TXA 250mg neb x2 doses.  12/19 spiked fever to 102 - pancultured, negative w/u. CXR: MPRESSION: Stable small pleural effusions with associated atelectasis.  Pt is known to this dept with previous AAC assessment on 12/6/23 with subsequent deferrals of PMV in line assessment as Pt was not deemed to be appropriate or d/c was pending.

## 2023-12-29 NOTE — PROGRESS NOTE ADULT - PROBLEM SELECTOR PLAN 5
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   - PEG Site appears macerated --> Multifactorial, possible the PEG has been too tight at home  - Peg loosened by GI at beside, no leakage or further intervention required   - recurrent RLQ abdominal pain and bleeding at the PEG site  - reevaluated by GI -- no bleeding at the PEG site  - 12/1: s/p abdominal ultrasound - limited study   - (12/3): Pt is c/o abd pain, and daughter is concerned   - AM amylase and Lipase all wnl , CT A/P 12/3 - no acute findings  - Continue Simethicone    - 12/20 silver nitrate applied to PEG site by GI

## 2023-12-29 NOTE — SPEAKING VALVE EVALUATION - SPUTUM - CONSISTENCY
seda increased amount of secretions evident following cuff reinflation via oral cavity and Pt suctioned by PA./seda

## 2023-12-29 NOTE — PROGRESS NOTE ADULT - PROBLEM SELECTOR PLAN 6
- Echo from 10/17/2020 notable for hyperdynamic L ventricular systolic function, moderately severe LVOT obstruction, and EF 81% (per Wilkinson calculation) vs 77% (per Teichholz calculation)  - Amlodipine 10mg QD

## 2023-12-29 NOTE — SPEAKING VALVE EVALUATION - CUFF PRE-EVALUATION: (EVALUATION OF TOLERANCE OF CUFF DEFLATION)
Pt evidenced frequent gagging with redness in face intermittently for approximately 10 minute trial of cuff deflation; minimal phonation achieved as gagging increased when Pt attempted to phonate. SP02 decreased to 96% initially with increase to 99% as exam continued; PEEP decreased to 0; Increased heart rate to 116 noted and cuff subsequently reinflated by Respiratory therapy with BERNA Narayan at b/s; Pt able to achieve intermittent phonation with AC support and partial cuff deflation although decreased phonation achieved during CPAP therefore PA returned Pt to AC with slight cuff deflation; VSS./Does not tolerate cuff deflation

## 2023-12-29 NOTE — PROGRESS NOTE ADULT - NS ATTEND AMEND GEN_ALL_CORE FT
: 73F with a h/o DM, HTN, HLD, anemia, hypothyroidism, RA, fibromyalgia, remote cerebral aneurysm with repair, hypercapnic respiratory failure s/p tracheostomy/PEG, bedbound, sacral pressure ulcer. Admitted w/ bleeding from tracheostomy and PEG w/ associated abdominal pain, recent hx of auditory/visual hallucinations.   Imaging showed mild thickening along PEG tube tract and sacral ulcer extending to coccyx suggestive of acute osteomyelitis.      # Osteomyelitis  # Chronic hypoxemic RF  # oropharyngeal dysphagia  # acute on chronic pain  # Trach/Peg bleeding  # Tracheitis with Pseudomonas and serratia  # Hx of hallucination, anxiety     On physical exam she is awake, breathing comfortably on ventilator, bilateral breath sounds present, abd soft, non tender non distended, extremities warm and well perfused .    No acute events overnight    #Neuro - awake, alert, and interactive. moving all extremities, mouthing words. continue current medications  #Cardiovascular - hemodynamically stable  #Pulmonary - chronic hypoxemic respiratory failure, has 8XLT trach, on home vent, pressure support trials daily.   No further hemoptysis after TXA nebs, Minimize suctioning as needed  #Gastro - oropharyngeal dysphagia with PEG tube in place, tolerating feeds, nutrition follow up appreciated   Appreciate GI eval of PEG site, minimal leak, tolerating feeds  #ID - sacral osteo on MRI - wound care follow-up appreciated, c/w offloading and local wound care  -The patient has had this wound since a hospitalization in December 2022 and per daughter does not have chronic or multiple wounds but only this single wound.   - s/p 6 week course of Cipro and Keflex as per ID - completed 12/10/23  - afebrile off abx today   #Hem/Onc - prior cytopenias in setting of antibiotics per patient's daughter, stable Hb today   - Hem/Onc follow-up noted - suspect an anemia of chronic disease  #Pain - continue current pain control - in setting of fibromyalgia and pain from wound  - Voltaren topical, lidocaine patches and low dose Oxycodone as needed  #Derm - previously seen by derm for skin lesions - mid back with irritated seborrheic keratosis - outpatient follow-up  #Endo - DM2 - continue insulin and adjust as needed for goal FS < 180  - Endocrine follow-up appreciated   - Continue Synthroid - TSH WNL  #DVT proph - SCDs.

## 2023-12-29 NOTE — SPEAKING VALVE EVALUATION - DIAGNOSTIC IMPRESSIONS
Pt is a 72 y/o female who presents with aphonia in presence of trach. Pt evidenced decreased tolerance to cuff deflation with limited phonation achieved as Pt was frequently gagging with increased work of breathing noted. Pt is not deemed to be a candidate for PMV placement in line with vent at this time. Per PA d/w Pt daughter and , the Pt was able to achieve intermittent phonation with slight cuff deflation and AC vent support. Pt is a 74 y/o female who presents with aphonia in presence of trach. Pt evidenced decreased tolerance to cuff deflation with limited phonation achieved as Pt was frequently gagging with increased work of breathing noted. Pt also noted with increased oral secretions requiring suctioning following cuff reinflation. Pt is not deemed to be a candidate for PMV placement in line with vent at this time. Per PA d/w Pt daughter and , the Pt was able to achieve intermittent phonation with slight cuff deflation and AC vent support. Pt is a 72 y/o female who presents with aphonia in presence of trach. Pt evidenced decreased tolerance to cuff deflation with limited phonation achieved as Pt was frequently gagging with increased work of breathing noted. Pt also noted with increased oral secretions requiring suctioning following cuff reinflation. Pt is not deemed to be a candidate for PMV placement in line with vent at this time. Per PA d/w Pt daughter and , the Pt was able to achieve intermittent phonation with slight cuff deflation and AC vent support.

## 2023-12-29 NOTE — SPEAKING VALVE EVALUATION - OBSERVATIONS
Pt awake in bed with  and private aide at b/s. Upon encounter, Pt attempting to mouth words; AAC board on night stand with staff report of limited use. Pt's  and daughter (via telephone) informed of findings and recommendations; SLP answered all questions upon completion of assessment with BERNA Narayan present.  Pt answered simple y/n questions and followed simple commands. +PEG  Respiratory therapy present and Pt changed from CPAP trial to AC prior to cuff deflation with tidal volume 300 throughout assessment; PEEP decreased to 0 for part of assessment.

## 2023-12-30 LAB
GLUCOSE BLDC GLUCOMTR-MCNC: 151 MG/DL — HIGH (ref 70–99)
GLUCOSE BLDC GLUCOMTR-MCNC: 151 MG/DL — HIGH (ref 70–99)
GLUCOSE BLDC GLUCOMTR-MCNC: 163 MG/DL — HIGH (ref 70–99)
GLUCOSE BLDC GLUCOMTR-MCNC: 163 MG/DL — HIGH (ref 70–99)
GLUCOSE BLDC GLUCOMTR-MCNC: 168 MG/DL — HIGH (ref 70–99)
GLUCOSE BLDC GLUCOMTR-MCNC: 168 MG/DL — HIGH (ref 70–99)
GLUCOSE BLDC GLUCOMTR-MCNC: 201 MG/DL — HIGH (ref 70–99)
GLUCOSE BLDC GLUCOMTR-MCNC: 201 MG/DL — HIGH (ref 70–99)
GLUCOSE BLDC GLUCOMTR-MCNC: 244 MG/DL — HIGH (ref 70–99)
GLUCOSE BLDC GLUCOMTR-MCNC: 244 MG/DL — HIGH (ref 70–99)

## 2023-12-30 PROCEDURE — 99232 SBSQ HOSP IP/OBS MODERATE 35: CPT

## 2023-12-30 RX ADMIN — LIDOCAINE 5 MILLILITER(S): 4 CREAM TOPICAL at 05:18

## 2023-12-30 RX ADMIN — GABAPENTIN 100 MILLIGRAM(S): 400 CAPSULE ORAL at 21:32

## 2023-12-30 RX ADMIN — SIMETHICONE 80 MILLIGRAM(S): 80 TABLET, CHEWABLE ORAL at 12:41

## 2023-12-30 RX ADMIN — SIMETHICONE 80 MILLIGRAM(S): 80 TABLET, CHEWABLE ORAL at 05:17

## 2023-12-30 RX ADMIN — Medication 1000 MILLIGRAM(S): at 21:36

## 2023-12-30 RX ADMIN — INSULIN GLARGINE 19 UNIT(S): 100 INJECTION, SOLUTION SUBCUTANEOUS at 08:23

## 2023-12-30 RX ADMIN — Medication 1 APPLICATION(S): at 18:01

## 2023-12-30 RX ADMIN — LIDOCAINE 1 PATCH: 4 CREAM TOPICAL at 19:00

## 2023-12-30 RX ADMIN — Medication 2: at 12:40

## 2023-12-30 RX ADMIN — INSULIN HUMAN 32 UNIT(S): 100 INJECTION, SOLUTION SUBCUTANEOUS at 05:59

## 2023-12-30 RX ADMIN — Medication 2 DROP(S): at 05:17

## 2023-12-30 RX ADMIN — Medication 5 MILLIGRAM(S): at 05:16

## 2023-12-30 RX ADMIN — LIDOCAINE 5 MILLILITER(S): 4 CREAM TOPICAL at 18:33

## 2023-12-30 RX ADMIN — AMLODIPINE BESYLATE 10 MILLIGRAM(S): 2.5 TABLET ORAL at 05:16

## 2023-12-30 RX ADMIN — NYSTATIN CREAM 1 APPLICATION(S): 100000 CREAM TOPICAL at 14:58

## 2023-12-30 RX ADMIN — Medication 1: at 05:59

## 2023-12-30 RX ADMIN — NYSTATIN CREAM 1 APPLICATION(S): 100000 CREAM TOPICAL at 05:18

## 2023-12-30 RX ADMIN — LIDOCAINE 1 PATCH: 4 CREAM TOPICAL at 12:43

## 2023-12-30 RX ADMIN — INSULIN HUMAN 9 UNIT(S): 100 INJECTION, SOLUTION SUBCUTANEOUS at 17:59

## 2023-12-30 RX ADMIN — Medication 3 MILLILITER(S): at 11:16

## 2023-12-30 RX ADMIN — SIMETHICONE 80 MILLIGRAM(S): 80 TABLET, CHEWABLE ORAL at 23:26

## 2023-12-30 RX ADMIN — Medication 2 DROP(S): at 12:38

## 2023-12-30 RX ADMIN — Medication 125 MICROGRAM(S): at 04:42

## 2023-12-30 RX ADMIN — Medication 1 APPLICATION(S): at 05:17

## 2023-12-30 RX ADMIN — Medication 1000 MILLIGRAM(S): at 22:15

## 2023-12-30 RX ADMIN — Medication 2 DROP(S): at 23:26

## 2023-12-30 RX ADMIN — INSULIN HUMAN 15 UNIT(S): 100 INJECTION, SOLUTION SUBCUTANEOUS at 12:40

## 2023-12-30 RX ADMIN — Medication 2 DROP(S): at 17:59

## 2023-12-30 RX ADMIN — Medication 1 APPLICATION(S): at 12:39

## 2023-12-30 RX ADMIN — PHENYLEPHRINE-SHARK LIVER OIL-MINERAL OIL-PETROLATUM RECTAL OINTMENT 1 APPLICATION(S): at 10:32

## 2023-12-30 RX ADMIN — Medication 1 TABLET(S): at 05:17

## 2023-12-30 RX ADMIN — Medication 5 MILLIGRAM(S): at 21:32

## 2023-12-30 RX ADMIN — Medication 3 MILLILITER(S): at 23:30

## 2023-12-30 RX ADMIN — Medication 500 MILLIGRAM(S): at 12:41

## 2023-12-30 RX ADMIN — LIDOCAINE 1 PATCH: 4 CREAM TOPICAL at 00:00

## 2023-12-30 RX ADMIN — Medication 3 MILLILITER(S): at 17:17

## 2023-12-30 RX ADMIN — Medication 1 MILLIGRAM(S): at 21:32

## 2023-12-30 RX ADMIN — SIMETHICONE 80 MILLIGRAM(S): 80 TABLET, CHEWABLE ORAL at 17:58

## 2023-12-30 RX ADMIN — CHLORHEXIDINE GLUCONATE 15 MILLILITER(S): 213 SOLUTION TOPICAL at 17:59

## 2023-12-30 RX ADMIN — CHLORHEXIDINE GLUCONATE 1 APPLICATION(S): 213 SOLUTION TOPICAL at 05:18

## 2023-12-30 RX ADMIN — Medication 3 MILLILITER(S): at 06:12

## 2023-12-30 RX ADMIN — Medication 1 TABLET(S): at 17:59

## 2023-12-30 RX ADMIN — CHLORHEXIDINE GLUCONATE 15 MILLILITER(S): 213 SOLUTION TOPICAL at 05:17

## 2023-12-30 RX ADMIN — ZINC OXIDE 1 APPLICATION(S): 200 OINTMENT TOPICAL at 10:30

## 2023-12-30 RX ADMIN — Medication 15 MILLILITER(S): at 12:41

## 2023-12-30 RX ADMIN — Medication 1: at 18:00

## 2023-12-30 RX ADMIN — Medication 1 APPLICATION(S): at 23:26

## 2023-12-30 RX ADMIN — PANTOPRAZOLE SODIUM 40 MILLIGRAM(S): 20 TABLET, DELAYED RELEASE ORAL at 08:23

## 2023-12-30 RX ADMIN — ESCITALOPRAM OXALATE 10 MILLIGRAM(S): 10 TABLET, FILM COATED ORAL at 08:23

## 2023-12-30 RX ADMIN — QUETIAPINE FUMARATE 12.5 MILLIGRAM(S): 200 TABLET, FILM COATED ORAL at 17:58

## 2023-12-30 RX ADMIN — NYSTATIN CREAM 1 APPLICATION(S): 100000 CREAM TOPICAL at 21:33

## 2023-12-30 NOTE — PROGRESS NOTE ADULT - NS ATTEND AMEND GEN_ALL_CORE FT
73F with a h/o DM, HTN, HLD, anemia, hypothyroidism, RA, fibromyalgia, remote cerebral aneurysm with repair, hypercapnic respiratory failure s/p tracheostomy/PEG, bedbound, sacral pressure ulcer. Admitted w/ bleeding from tracheostomy and PEG w/ associated abdominal pain, recent hx of auditory/visual hallucinations. Imaging showed mild thickening along PEG tube tract and sacral ulcer extending to coccyx suggestive of acute osteomyelitis.      # Osteomyelitis  # Chronic hypoxemic RF  # oropharyngeal dysphagia  # acute on chronic pain  # Trach/Peg bleeding  # Tracheitis with Pseudomonas and serratia  # Hx of hallucination, anxiety     On physical exam she is awake, breathing comfortably on ventilator, bilateral breath sounds present, abd soft, non tender non distended, extremities warm and well perfused.  No acute events overnight  Remains on chronic mech vent, tolerating PS trials during the day for periods of time  Tolerating PEG feeds  OM s/p 6 week course of Cipro and Keflex as per ID - completed 12/10/23  c/w current pain regimen - in setting of fibromyalgia and pain from wound  Medically stable for discharge

## 2023-12-30 NOTE — PROGRESS NOTE ADULT - PROBLEM SELECTOR PLAN 4
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  - Vent:  volume /12/40%/+5  - Tolerates intermittent PSV 15/5 during day/Vent HS  - Airway Clearance: Duoneb, Hypersal, Chest PT, PRN suctioning   - 12/29:  Unable to tolerate inline speaking valve.  Was able to phonate with partial cuff deflation thus encouraged to be used when family present ot facilitate communication.    Tracheal Bleeding (Intermittent)-->resolved since admission   - S/p CT Angio neck: No evidence of active bleeding around tracheostomy site. No hematoma.  No collection   - Seen by ENT; Car/vero and b/l bronchi visualized without any trauma, small amount of blood tinged secretions resting at beginning of right bronchus not actively bleeding.  - 12/13 tracheal bleeding upon suctioning - tranexamic neb given  - 12/18 tranexamic neb x2 doses - improved

## 2023-12-30 NOTE — PROGRESS NOTE ADULT - SUBJECTIVE AND OBJECTIVE BOX
Patient is a 73y old  Female who presents with a chief complaint of PEG Bleeding (22 Dec 2023 07:35)      Interval Events:    REVIEW OF SYSTEMS:  [ ] Positive  [ ] All other systems negative  [ ] Unable to assess ROS because ________    Vital Signs Last 24 Hrs  T(C): 36.7 (12-30-23 @ 05:00), Max: 36.8 (12-29-23 @ 10:53)  T(F): 98 (12-30-23 @ 05:00), Max: 98.3 (12-29-23 @ 10:53)  HR: 81 (12-30-23 @ 06:12) (77 - 100)  BP: 140/72 (12-30-23 @ 05:00) (104/50 - 140/72)  RR: 17 (12-30-23 @ 05:00) (14 - 18)  SpO2: 100% (12-30-23 @ 06:12) (99% - 100%)PHYSICAL EXAM:  HEENT:   [ ]Tracheostomy:  [ ]Pupils equal  [ ]No oral lesions  [ ]Abnormal        SKIN  [ ]No Rash  [ ] Abnormal  [ ] pressure    CARDIAC  [ ]Regular  [ ]Abnormal    PULMONARY  [ ]Bilateral Clear Breath Sounds  [ ]Normal Excursion  [ ]Abnormal    GI  [ ]PEG      [ ] +BS		              [ ]Soft, nondistended, nontender	  [ ]Abnormal    MUSCULOSKELETAL                                   [ ]Bedbound                 [ ]Abnormal    [ ]Ambulatory/OOB to chair                           EXTREMITIES                                         [ ]Normal  [ ]Edema                           NEUROLOGIC  [ ] Normal, non focal  [ ] Focal findings:    PSYCHIATRIC  [ ]Alert and appropriate  [ ] Sedated	 [ ]Agitated    :  Mcbride: [ ] Yes, if yes: Date of Placement:                   [  ] No    LINES: Central Lines [ ] Yes, if yes: Date of Placement                                     [  ] No    HOSPITAL MEDICATIONS:  MEDICATIONS  (STANDING):  albuterol/ipratropium for Nebulization 3 milliLiter(s) Nebulizer every 6 hours  amLODIPine   Tablet 10 milliGRAM(s) Oral daily  artificial  tears Solution 2 Drop(s) Both EYES four times a day  ascorbic acid 500 milliGRAM(s) Oral daily  calcium carbonate    500 mG (Tums) Chewable 1 Tablet(s) Chew every 12 hours  chlorhexidine 0.12% Liquid 15 milliLiter(s) Oral Mucosa every 12 hours  chlorhexidine 4% Liquid 1 Application(s) Topical <User Schedule>  dextrose 50% Injectable 12.5 Gram(s) IV Push once  dextrose 50% Injectable 25 Gram(s) IV Push once  escitalopram 10 milliGRAM(s) Oral <User Schedule>  fentaNYL   Patch  25 MICROgram(s)/Hr 1 Patch Transdermal every 72 hours  gabapentin Solution 100 milliGRAM(s) Enteral Tube at bedtime  glucagon  Injectable 1 milliGRAM(s) IntraMuscular once  hemorrhoidal Ointment      hemorrhoidal Ointment 1 Application(s) Rectal daily  insulin glargine Injectable (LANTUS) 19 Unit(s) SubCutaneous every morning  insulin lispro (ADMELOG) corrective regimen sliding scale   SubCutaneous every 6 hours  insulin regular  human recombinant 32 Unit(s) SubCutaneous <User Schedule>  insulin regular  human recombinant 9 Unit(s) SubCutaneous <User Schedule>  insulin regular  human recombinant 15 Unit(s) SubCutaneous <User Schedule>  levothyroxine 125 MICROGram(s) Oral <User Schedule>  lidocaine   4% Patch 1 Patch Transdermal daily  lidocaine   4% Patch 1 Patch Transdermal daily  lidocaine 2% Viscous 5 milliLiter(s) Mucosal two times a day  melatonin 5 milliGRAM(s) Oral at bedtime  melatonin 1 milliGRAM(s) Oral at bedtime  multivitamin/minerals/iron Oral Solution (CENTRUM) 15 milliLiter(s) Oral daily  nystatin Powder 1 Application(s) Topical every 8 hours  pantoprazole   Suspension 40 milliGRAM(s) Enteral Tube <User Schedule>  petrolatum Ophthalmic Ointment 1 Application(s) Both EYES four times a day  predniSONE   Tablet 5 milliGRAM(s) Oral daily  QUEtiapine 12.5 milliGRAM(s) Oral <User Schedule>  silver nitrate Applicator 1 Application(s) Topical once  simethicone 80 milliGRAM(s) Chew four times a day  zinc oxide 40% Paste 1 Application(s) Topical daily    MEDICATIONS  (PRN):  acetaminophen   Oral Liquid .. 1000 milliGRAM(s) Enteral Tube every 6 hours PRN Temp greater or equal to 38C (100.4F), Mild Pain (1 - 3)  benzocaine 20% Spray 1 Spray(s) Topical four times a day PRN Cough  diclofenac sodium 1% Gel 2 Gram(s) Topical four times a day PRN pain  diphenhydrAMINE Elixir 25 milliGRAM(s) Oral every 6 hours PRN Rash and/or Itching  guaifenesin/dextromethorphan Oral Liquid 10 milliLiter(s) Oral every 6 hours PRN Cough  oxyCODONE    Solution 2.5 milliGRAM(s) Oral every 6 hours PRN Moderate Pain (4 - 6)  sodium chloride 0.65% Nasal 1 Spray(s) Both Nostrils every 6 hours PRN Nasal Congestion      LABS:                        7.9    6.37  )-----------( 110      ( 29 Dec 2023 06:46 )             26.6     12-29    137  |  98  |  27<H>  ----------------------------<  151<H>  4.0   |  31  |  <0.30<L>    Ca    9.8      29 Dec 2023 06:46  Phos  3.8     12-29  Mg     2.0     12-29        Urinalysis Basic - ( 29 Dec 2023 06:46 )    Color: x / Appearance: x / SG: x / pH: x  Gluc: 151 mg/dL / Ketone: x  / Bili: x / Urobili: x   Blood: x / Protein: x / Nitrite: x   Leuk Esterase: x / RBC: x / WBC x   Sq Epi: x / Non Sq Epi: x / Bacteria: x          CAPILLARY BLOOD GLUCOSE    MICROBIOLOGY:     RADIOLOGY:  [ ] Reviewed and interpreted by me    Mode: AC/ CMV (Assist Control/ Continuous Mandatory Ventilation)  RR (machine): 12  TV (machine): 300  FiO2: 30  PEEP: 5  ITime: 1  MAP: 8  PIP: 26   Patient is a 73y old  Female who presents with a chief complaint of PEG Bleeding (22 Dec 2023 07:35)      Interval Events: S/p Inline PMV re-evaluation yesteday, with no acute overnight events     REVIEW OF SYSTEMS:  [ ] Positive  [x ] All other systems negative  [ ] Unable to assess ROS because ________    Vital Signs Last 24 Hrs  T(C): 36.7 (12-30-23 @ 05:00), Max: 36.8 (12-29-23 @ 10:53)  T(F): 98 (12-30-23 @ 05:00), Max: 98.3 (12-29-23 @ 10:53)  HR: 81 (12-30-23 @ 06:12) (77 - 100)  BP: 140/72 (12-30-23 @ 05:00) (104/50 - 140/72)  RR: 17 (12-30-23 @ 05:00) (14 - 18)  SpO2: 100% (12-30-23 @ 06:12) (99% - 100%)    PHYSICAL EXAM:    HEENT:   [X] Tracheostomy: #8 distal XLT Cuffed Shiley  [X] PERRL B/L; EOMI  [ ]No oral lesions  [X] Abnormal: missing; +loose bottom teeth, poor dentition     SKIN  [ ]No Rash  [ ] Abnormal  [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    CARDIAC  [X] Regular  [ ] Abnormal    PULMONARY  [ ] Bilateral Clear Breath Sounds  [ ] Normal Excursion  [X] Abnormal - Minimal Coarse BS bilaterally     GI  [X] PEG site c/d/i, no leakage noted      [X] +BS		              [X] Soft, nondistended, nontender	  [ ] Abnormal    MUSCULOSKELETAL                                   [X] Bedbound                 [ ] Abnormal    [ ] Ambulatory/OOB to chair                           EXTREMITIES                                         [X] Normal  [ ]Edema                           NEUROLOGIC  [ ] Normal, non focal  [X] Focal findings: awake and arousable to voice, follows commands on all 4 extremities; limited mobility of legs with partial B/L foot drop    PSYCHIATRIC  [X]Alert and appropriate  [ ] Sedated	 [ ]Agitated    :  Mcbride: [ ] Yes, if yes: Date of Placement:                   [X] No    LINES: Central Lines [ ] Yes, if yes: Date of Placement                                     [X] No    HOSPITAL MEDICATIONS:  MEDICATIONS  (STANDING):  albuterol/ipratropium for Nebulization 3 milliLiter(s) Nebulizer every 6 hours  amLODIPine   Tablet 10 milliGRAM(s) Oral daily  artificial  tears Solution 2 Drop(s) Both EYES four times a day  ascorbic acid 500 milliGRAM(s) Oral daily  calcium carbonate    500 mG (Tums) Chewable 1 Tablet(s) Chew every 12 hours  chlorhexidine 0.12% Liquid 15 milliLiter(s) Oral Mucosa every 12 hours  chlorhexidine 4% Liquid 1 Application(s) Topical <User Schedule>  dextrose 50% Injectable 12.5 Gram(s) IV Push once  dextrose 50% Injectable 25 Gram(s) IV Push once  escitalopram 10 milliGRAM(s) Oral <User Schedule>  fentaNYL   Patch  25 MICROgram(s)/Hr 1 Patch Transdermal every 72 hours  gabapentin Solution 100 milliGRAM(s) Enteral Tube at bedtime  glucagon  Injectable 1 milliGRAM(s) IntraMuscular once  hemorrhoidal Ointment      hemorrhoidal Ointment 1 Application(s) Rectal daily  insulin glargine Injectable (LANTUS) 19 Unit(s) SubCutaneous every morning  insulin lispro (ADMELOG) corrective regimen sliding scale   SubCutaneous every 6 hours  insulin regular  human recombinant 32 Unit(s) SubCutaneous <User Schedule>  insulin regular  human recombinant 9 Unit(s) SubCutaneous <User Schedule>  insulin regular  human recombinant 15 Unit(s) SubCutaneous <User Schedule>  levothyroxine 125 MICROGram(s) Oral <User Schedule>  lidocaine   4% Patch 1 Patch Transdermal daily  lidocaine   4% Patch 1 Patch Transdermal daily  lidocaine 2% Viscous 5 milliLiter(s) Mucosal two times a day  melatonin 5 milliGRAM(s) Oral at bedtime  melatonin 1 milliGRAM(s) Oral at bedtime  multivitamin/minerals/iron Oral Solution (CENTRUM) 15 milliLiter(s) Oral daily  nystatin Powder 1 Application(s) Topical every 8 hours  pantoprazole   Suspension 40 milliGRAM(s) Enteral Tube <User Schedule>  petrolatum Ophthalmic Ointment 1 Application(s) Both EYES four times a day  predniSONE   Tablet 5 milliGRAM(s) Oral daily  QUEtiapine 12.5 milliGRAM(s) Oral <User Schedule>  silver nitrate Applicator 1 Application(s) Topical once  simethicone 80 milliGRAM(s) Chew four times a day  zinc oxide 40% Paste 1 Application(s) Topical daily    MEDICATIONS  (PRN):  acetaminophen   Oral Liquid .. 1000 milliGRAM(s) Enteral Tube every 6 hours PRN Temp greater or equal to 38C (100.4F), Mild Pain (1 - 3)  benzocaine 20% Spray 1 Spray(s) Topical four times a day PRN Cough  diclofenac sodium 1% Gel 2 Gram(s) Topical four times a day PRN pain  diphenhydrAMINE Elixir 25 milliGRAM(s) Oral every 6 hours PRN Rash and/or Itching  guaifenesin/dextromethorphan Oral Liquid 10 milliLiter(s) Oral every 6 hours PRN Cough  oxyCODONE    Solution 2.5 milliGRAM(s) Oral every 6 hours PRN Moderate Pain (4 - 6)  sodium chloride 0.65% Nasal 1 Spray(s) Both Nostrils every 6 hours PRN Nasal Congestion      LABS:                        7.9    6.37  )-----------( 110      ( 29 Dec 2023 06:46 )             26.6     12-29    137  |  98  |  27<H>  ----------------------------<  151<H>  4.0   |  31  |  <0.30<L>    Ca    9.8      29 Dec 2023 06:46  Phos  3.8     12-29  Mg     2.0     12-29        Urinalysis Basic - ( 29 Dec 2023 06:46 )    Color: x / Appearance: x / SG: x / pH: x  Gluc: 151 mg/dL / Ketone: x  / Bili: x / Urobili: x   Blood: x / Protein: x / Nitrite: x   Leuk Esterase: x / RBC: x / WBC x   Sq Epi: x / Non Sq Epi: x / Bacteria: x          CAPILLARY BLOOD GLUCOSE    MICROBIOLOGY:     RADIOLOGY:  [ ] Reviewed and interpreted by me    Mode: AC/ CMV (Assist Control/ Continuous Mandatory Ventilation)  RR (machine): 12  TV (machine): 300  FiO2: 30  PEEP: 5  ITime: 1  MAP: 8  PIP: 26   Patient is a 73y old  Female who presents with a chief complaint of PEG Bleeding (22 Dec 2023 07:35)      Interval Events: S/p Inline PMV re-evaluation yesterday with no acute overnight events     REVIEW OF SYSTEMS:  [ ] Positive  [x ] All other systems negative  [ ] Unable to assess ROS because ________    Vital Signs Last 24 Hrs  T(C): 36.7 (12-30-23 @ 05:00), Max: 36.8 (12-29-23 @ 10:53)  T(F): 98 (12-30-23 @ 05:00), Max: 98.3 (12-29-23 @ 10:53)  HR: 81 (12-30-23 @ 06:12) (77 - 100)  BP: 140/72 (12-30-23 @ 05:00) (104/50 - 140/72)  RR: 17 (12-30-23 @ 05:00) (14 - 18)  SpO2: 100% (12-30-23 @ 06:12) (99% - 100%)    PHYSICAL EXAM:    HEENT:   [X] Tracheostomy: #8 distal XLT Cuffed Shiley  [X] PERRL B/L; EOMI  [ ]No oral lesions  [X] Abnormal: missing; +loose bottom teeth, poor dentition     SKIN  [ ]No Rash  [ ] Abnormal  [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    CARDIAC  [X] Regular  [ ] Abnormal    PULMONARY  [ ] Bilateral Clear Breath Sounds  [ ] Normal Excursion  [X] Abnormal - Minimal Coarse BS bilaterally     GI  [X] PEG site c/d/i, no leakage noted      [X] +BS		              [X] Soft, nondistended, nontender	  [ ] Abnormal    MUSCULOSKELETAL                                   [X] Bedbound                 [ ] Abnormal    [ ] Ambulatory/OOB to chair                           EXTREMITIES                                         [X] Normal  [ ]Edema                           NEUROLOGIC  [ ] Normal, non focal  [X] Focal findings: awake and arousable to voice, follows commands on all 4 extremities; limited mobility of legs with partial B/L foot drop    PSYCHIATRIC  [X]Alert and appropriate  [ ] Sedated	 [ ]Agitated    :  Mcbride: [ ] Yes, if yes: Date of Placement:                   [X] No    LINES: Central Lines [ ] Yes, if yes: Date of Placement                                     [X] No    HOSPITAL MEDICATIONS:  MEDICATIONS  (STANDING):  albuterol/ipratropium for Nebulization 3 milliLiter(s) Nebulizer every 6 hours  amLODIPine   Tablet 10 milliGRAM(s) Oral daily  artificial  tears Solution 2 Drop(s) Both EYES four times a day  ascorbic acid 500 milliGRAM(s) Oral daily  calcium carbonate    500 mG (Tums) Chewable 1 Tablet(s) Chew every 12 hours  chlorhexidine 0.12% Liquid 15 milliLiter(s) Oral Mucosa every 12 hours  chlorhexidine 4% Liquid 1 Application(s) Topical <User Schedule>  dextrose 50% Injectable 12.5 Gram(s) IV Push once  dextrose 50% Injectable 25 Gram(s) IV Push once  escitalopram 10 milliGRAM(s) Oral <User Schedule>  fentaNYL   Patch  25 MICROgram(s)/Hr 1 Patch Transdermal every 72 hours  gabapentin Solution 100 milliGRAM(s) Enteral Tube at bedtime  glucagon  Injectable 1 milliGRAM(s) IntraMuscular once  hemorrhoidal Ointment      hemorrhoidal Ointment 1 Application(s) Rectal daily  insulin glargine Injectable (LANTUS) 19 Unit(s) SubCutaneous every morning  insulin lispro (ADMELOG) corrective regimen sliding scale   SubCutaneous every 6 hours  insulin regular  human recombinant 32 Unit(s) SubCutaneous <User Schedule>  insulin regular  human recombinant 9 Unit(s) SubCutaneous <User Schedule>  insulin regular  human recombinant 15 Unit(s) SubCutaneous <User Schedule>  levothyroxine 125 MICROGram(s) Oral <User Schedule>  lidocaine   4% Patch 1 Patch Transdermal daily  lidocaine   4% Patch 1 Patch Transdermal daily  lidocaine 2% Viscous 5 milliLiter(s) Mucosal two times a day  melatonin 5 milliGRAM(s) Oral at bedtime  melatonin 1 milliGRAM(s) Oral at bedtime  multivitamin/minerals/iron Oral Solution (CENTRUM) 15 milliLiter(s) Oral daily  nystatin Powder 1 Application(s) Topical every 8 hours  pantoprazole   Suspension 40 milliGRAM(s) Enteral Tube <User Schedule>  petrolatum Ophthalmic Ointment 1 Application(s) Both EYES four times a day  predniSONE   Tablet 5 milliGRAM(s) Oral daily  QUEtiapine 12.5 milliGRAM(s) Oral <User Schedule>  silver nitrate Applicator 1 Application(s) Topical once  simethicone 80 milliGRAM(s) Chew four times a day  zinc oxide 40% Paste 1 Application(s) Topical daily    MEDICATIONS  (PRN):  acetaminophen   Oral Liquid .. 1000 milliGRAM(s) Enteral Tube every 6 hours PRN Temp greater or equal to 38C (100.4F), Mild Pain (1 - 3)  benzocaine 20% Spray 1 Spray(s) Topical four times a day PRN Cough  diclofenac sodium 1% Gel 2 Gram(s) Topical four times a day PRN pain  diphenhydrAMINE Elixir 25 milliGRAM(s) Oral every 6 hours PRN Rash and/or Itching  guaifenesin/dextromethorphan Oral Liquid 10 milliLiter(s) Oral every 6 hours PRN Cough  oxyCODONE    Solution 2.5 milliGRAM(s) Oral every 6 hours PRN Moderate Pain (4 - 6)  sodium chloride 0.65% Nasal 1 Spray(s) Both Nostrils every 6 hours PRN Nasal Congestion      LABS:                        7.9    6.37  )-----------( 110      ( 29 Dec 2023 06:46 )             26.6     12-29    137  |  98  |  27<H>  ----------------------------<  151<H>  4.0   |  31  |  <0.30<L>    Ca    9.8      29 Dec 2023 06:46  Phos  3.8     12-29  Mg     2.0     12-29        Urinalysis Basic - ( 29 Dec 2023 06:46 )    Color: x / Appearance: x / SG: x / pH: x  Gluc: 151 mg/dL / Ketone: x  / Bili: x / Urobili: x   Blood: x / Protein: x / Nitrite: x   Leuk Esterase: x / RBC: x / WBC x   Sq Epi: x / Non Sq Epi: x / Bacteria: x          CAPILLARY BLOOD GLUCOSE    MICROBIOLOGY:     RADIOLOGY:  [ ] Reviewed and interpreted by me    Mode: AC/ CMV (Assist Control/ Continuous Mandatory Ventilation)  RR (machine): 12  TV (machine): 300  FiO2: 30  PEEP: 5  ITime: 1  MAP: 8  PIP: 26

## 2023-12-30 NOTE — PROGRESS NOTE ADULT - PROBLEM SELECTOR PLAN 1
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 aureus  - Sputum Cx 11/6: + PSA and Serratia, Cefepime 11/8 -->11/15  - 11/26 sputum culture MDR pseudomonas - clinically stable, defer treatment unless decompensates as per ID  - 11/26 urine culture - enterococcus - no need to treat - cfu low, organism not significant as per ID  - 12/19 respiked fever - blood cultures NGTD - received dose of vanco and cefepime - d/c'd as w/u negative

## 2023-12-30 NOTE — PROGRESS NOTE ADULT - PROBLEM SELECTOR PLAN 5
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   - PEG Site appears macerated --> Multifactorial, possible the PEG has been too tight at home  - Peg loosened by GI at beside, no leakage or further intervention required   - recurrent RLQ abdominal pain and bleeding at the PEG site  - reevaluated by GI -- no bleeding at the PEG site  - 12/1: s/p abdominal ultrasound - limited study   - (12/3): Pt is c/o abd pain, and daughter is concerned   - AM amylase and Lipase all wnl , CT A/P 12/3 - no acute findings  - Continue Simethicone    - 12/20 silver nitrate applied to PEG site by GI

## 2023-12-30 NOTE — PROGRESS NOTE ADULT - ASSESSMENT
74 y/o F with PMHx of T2DM, HTN, HLD, anemia, hypothyroidism, RA, fibromyalgia, remote cerebral aneurysm repair, acute hypercapnic respiratory failure now with tracheostomy, PEG tube, and bedbound (trach change 8/18, PEG change 8/14), sacral pressure ulcer, BIBEMS from home for 6 day hx of bleeding from the tracheostomy and PEG tube with associated abdominal pain, recent history of auditory and visual hallucinations, and with chronic sacral decubitus ulcer. Exam notable for mild abdominal distention with LLQ and RLQ tenderness, tracheostomy in place with no obvious bleeding, PEG tube with scant amount of dried blood, at baseline neurologic status. Imaging showing no active bleeding around tracheostomy site, however was notable for mild thickening along PEG tube tract, sacral ulcer extending to the coccyx suggestive of possible osteomyelitis, and bibasilar patchy lung opacities with volume loss. Patient admitted for respiratory support, wound care of tracheostomy and PEG, and management of sacral osteomyelitis. No further Trach and peg site bleeding noted since admission.  Pelvic MRI imaging also suggestive of Acute OM. Patient placed on long-term antibiotics with Infectious disease guidance.  Additionally treated with a 7d course of Cefepime due to PSA and Serratia tracheitis on 11/8-->11/15 and then resumed cipro/keflex.  Hospital course c/b Delirium for which Seroquel was added and home Lexapro continued. Tracheostomy changed to #9 Cuffed [place] by ENT 11/3.  Despite trach change patient remained with persistent air leak.  On 11/19, the trach was again changed due to leak and loss of volumes on vent.  Trach was changed to #8 distal cuffed [place].  Patient seen by Dermatology for back lesions.  One diagnosed as a seborrheic keratitis and the other a dermatofibroma.  Intermittent abdominal pain throughout hospital course, at times relieved with gas/BM, w/u negative, seen by GI - no recs.  12/10 pt completed cipro and keflex for osteo treatment.  12/13 moderate amounts of secretions w/ blood - tranexamic acid neb given with notable improvement.  12/18 with blood tinged secretion again - TXA 250mg neb x2 doses.  12/19 spiked fever to 102 - pancultured, negative w/u.    12/28:  No acute events.  Patient responsive without complaints this morning.  Sodium improved with additional free water.  Free water reduced to Q8H.  Unable to tolerate inline speaking valve however was able to phonate with cuff partially deflated.  Will encourage use of partial cuff deflation for language communicate as patient tolerates.  72 y/o F with PMHx of T2DM, HTN, HLD, anemia, hypothyroidism, RA, fibromyalgia, remote cerebral aneurysm repair, acute hypercapnic respiratory failure now with tracheostomy, PEG tube, and bedbound (trach change 8/18, PEG change 8/14), sacral pressure ulcer, BIBEMS from home for 6 day hx of bleeding from the tracheostomy and PEG tube with associated abdominal pain, recent history of auditory and visual hallucinations, and with chronic sacral decubitus ulcer. Exam notable for mild abdominal distention with LLQ and RLQ tenderness, tracheostomy in place with no obvious bleeding, PEG tube with scant amount of dried blood, at baseline neurologic status. Imaging showing no active bleeding around tracheostomy site, however was notable for mild thickening along PEG tube tract, sacral ulcer extending to the coccyx suggestive of possible osteomyelitis, and bibasilar patchy lung opacities with volume loss. Patient admitted for respiratory support, wound care of tracheostomy and PEG, and management of sacral osteomyelitis. No further Trach and peg site bleeding noted since admission.  Pelvic MRI imaging also suggestive of Acute OM. Patient placed on long-term antibiotics with Infectious disease guidance.  Additionally treated with a 7d course of Cefepime due to PSA and Serratia tracheitis on 11/8-->11/15 and then resumed cipro/keflex.  Hospital course c/b Delirium for which Seroquel was added and home Lexapro continued. Tracheostomy changed to #9 Cuffed [place] by ENT 11/3.  Despite trach change patient remained with persistent air leak.  On 11/19, the trach was again changed due to leak and loss of volumes on vent.  Trach was changed to #8 distal cuffed [place].  Patient seen by Dermatology for back lesions.  One diagnosed as a seborrheic keratitis and the other a dermatofibroma.  Intermittent abdominal pain throughout hospital course, at times relieved with gas/BM, w/u negative, seen by GI - no recs.  12/10 pt completed cipro and keflex for osteo treatment.  12/13 moderate amounts of secretions w/ blood - tranexamic acid neb given with notable improvement.  12/18 with blood tinged secretion again - TXA 250mg neb x2 doses.  12/19 spiked fever to 102 - pancultured, negative w/u.    12/28:  No acute events.  Patient responsive without complaints this morning.  Sodium improved with additional free water.  Free water reduced to Q8H.  Unable to tolerate inline speaking valve however was able to phonate with cuff partially deflated.  Will encourage use of partial cuff deflation for language communicate as patient tolerates.  72 y/o F with PMHx of T2DM, HTN, HLD, anemia, hypothyroidism, RA, fibromyalgia, remote cerebral aneurysm repair, acute hypercapnic respiratory failure now with tracheostomy, PEG tube, and bedbound (trach change 8/18, PEG change 8/14), sacral pressure ulcer, BIBEMS from home for 6 day hx of bleeding from the tracheostomy and PEG tube with associated abdominal pain, recent history of auditory and visual hallucinations, and with chronic sacral decubitus ulcer. Exam notable for mild abdominal distention with LLQ and RLQ tenderness, tracheostomy in place with no obvious bleeding, PEG tube with scant amount of dried blood, at baseline neurologic status. Imaging showing no active bleeding around tracheostomy site, however was notable for mild thickening along PEG tube tract, sacral ulcer extending to the coccyx suggestive of possible osteomyelitis, and bibasilar patchy lung opacities with volume loss. Patient admitted for respiratory support, wound care of tracheostomy and PEG, and management of sacral osteomyelitis. No further Trach and peg site bleeding noted since admission.  Pelvic MRI imaging also suggestive of Acute OM. Patient placed on long-term antibiotics with Infectious disease guidance.  Additionally treated with a 7d course of Cefepime due to PSA and Serratia tracheitis on 11/8-->11/15 and then resumed cipro/keflex.  Hospital course c/b Delirium for which Seroquel was added and home Lexapro continued. Tracheostomy changed to #9 Cuffed [place] by ENT 11/3.  Despite trach change patient remained with persistent air leak.  On 11/19, the trach was again changed due to leak and loss of volumes on vent.  Trach was changed to #8 distal cuffed [place].  Patient seen by Dermatology for back lesions.  One diagnosed as a seborrheic keratitis and the other a dermatofibroma.  Intermittent abdominal pain throughout hospital course, at times relieved with gas/BM, w/u negative, seen by GI - no recs.  12/10 pt completed cipro and keflex for osteo treatment.  12/13 moderate amounts of secretions w/ blood - tranexamic acid neb given with notable improvement.  12/18 with blood tinged secretion again - TXA 250mg neb x2 doses.  12/19 spiked fever to 102 - pancultured, negative w/u.    12/30:  No acute events.  DC planning in progress.  74 y/o F with PMHx of T2DM, HTN, HLD, anemia, hypothyroidism, RA, fibromyalgia, remote cerebral aneurysm repair, acute hypercapnic respiratory failure now with tracheostomy, PEG tube, and bedbound (trach change 8/18, PEG change 8/14), sacral pressure ulcer, BIBEMS from home for 6 day hx of bleeding from the tracheostomy and PEG tube with associated abdominal pain, recent history of auditory and visual hallucinations, and with chronic sacral decubitus ulcer. Exam notable for mild abdominal distention with LLQ and RLQ tenderness, tracheostomy in place with no obvious bleeding, PEG tube with scant amount of dried blood, at baseline neurologic status. Imaging showing no active bleeding around tracheostomy site, however was notable for mild thickening along PEG tube tract, sacral ulcer extending to the coccyx suggestive of possible osteomyelitis, and bibasilar patchy lung opacities with volume loss. Patient admitted for respiratory support, wound care of tracheostomy and PEG, and management of sacral osteomyelitis. No further Trach and peg site bleeding noted since admission.  Pelvic MRI imaging also suggestive of Acute OM. Patient placed on long-term antibiotics with Infectious disease guidance.  Additionally treated with a 7d course of Cefepime due to PSA and Serratia tracheitis on 11/8-->11/15 and then resumed cipro/keflex.  Hospital course c/b Delirium for which Seroquel was added and home Lexapro continued. Tracheostomy changed to #9 Cuffed [place] by ENT 11/3.  Despite trach change patient remained with persistent air leak.  On 11/19, the trach was again changed due to leak and loss of volumes on vent.  Trach was changed to #8 distal cuffed [place].  Patient seen by Dermatology for back lesions.  One diagnosed as a seborrheic keratitis and the other a dermatofibroma.  Intermittent abdominal pain throughout hospital course, at times relieved with gas/BM, w/u negative, seen by GI - no recs.  12/10 pt completed cipro and keflex for osteo treatment.  12/13 moderate amounts of secretions w/ blood - tranexamic acid neb given with notable improvement.  12/18 with blood tinged secretion again - TXA 250mg neb x2 doses.  12/19 spiked fever to 102 - pancultured, negative w/u.    12/30:  No acute events.  DC planning in progress.

## 2023-12-31 LAB
GLUCOSE BLDC GLUCOMTR-MCNC: 118 MG/DL — HIGH (ref 70–99)
GLUCOSE BLDC GLUCOMTR-MCNC: 118 MG/DL — HIGH (ref 70–99)
GLUCOSE BLDC GLUCOMTR-MCNC: 121 MG/DL — HIGH (ref 70–99)
GLUCOSE BLDC GLUCOMTR-MCNC: 121 MG/DL — HIGH (ref 70–99)
GLUCOSE BLDC GLUCOMTR-MCNC: 123 MG/DL — HIGH (ref 70–99)
GLUCOSE BLDC GLUCOMTR-MCNC: 123 MG/DL — HIGH (ref 70–99)
GLUCOSE BLDC GLUCOMTR-MCNC: 151 MG/DL — HIGH (ref 70–99)
GLUCOSE BLDC GLUCOMTR-MCNC: 151 MG/DL — HIGH (ref 70–99)
GLUCOSE BLDC GLUCOMTR-MCNC: 241 MG/DL — HIGH (ref 70–99)
GLUCOSE BLDC GLUCOMTR-MCNC: 241 MG/DL — HIGH (ref 70–99)

## 2023-12-31 PROCEDURE — 99232 SBSQ HOSP IP/OBS MODERATE 35: CPT

## 2023-12-31 RX ADMIN — Medication 125 MICROGRAM(S): at 04:34

## 2023-12-31 RX ADMIN — Medication 1 MILLIGRAM(S): at 21:35

## 2023-12-31 RX ADMIN — LIDOCAINE 5 MILLILITER(S): 4 CREAM TOPICAL at 05:41

## 2023-12-31 RX ADMIN — Medication 3 MILLILITER(S): at 17:21

## 2023-12-31 RX ADMIN — Medication 500 MILLIGRAM(S): at 12:58

## 2023-12-31 RX ADMIN — Medication 5 MILLIGRAM(S): at 05:39

## 2023-12-31 RX ADMIN — CHLORHEXIDINE GLUCONATE 15 MILLILITER(S): 213 SOLUTION TOPICAL at 05:41

## 2023-12-31 RX ADMIN — Medication 1 APPLICATION(S): at 23:25

## 2023-12-31 RX ADMIN — Medication 1: at 00:04

## 2023-12-31 RX ADMIN — LIDOCAINE 1 PATCH: 4 CREAM TOPICAL at 21:31

## 2023-12-31 RX ADMIN — Medication 3 MILLILITER(S): at 23:19

## 2023-12-31 RX ADMIN — LIDOCAINE 5 MILLILITER(S): 4 CREAM TOPICAL at 18:53

## 2023-12-31 RX ADMIN — ESCITALOPRAM OXALATE 10 MILLIGRAM(S): 10 TABLET, FILM COATED ORAL at 08:34

## 2023-12-31 RX ADMIN — Medication 2 DROP(S): at 23:24

## 2023-12-31 RX ADMIN — Medication 1 APPLICATION(S): at 18:13

## 2023-12-31 RX ADMIN — Medication 1 APPLICATION(S): at 12:59

## 2023-12-31 RX ADMIN — AMLODIPINE BESYLATE 10 MILLIGRAM(S): 2.5 TABLET ORAL at 05:39

## 2023-12-31 RX ADMIN — CHLORHEXIDINE GLUCONATE 1 APPLICATION(S): 213 SOLUTION TOPICAL at 05:40

## 2023-12-31 RX ADMIN — INSULIN HUMAN 9 UNIT(S): 100 INJECTION, SOLUTION SUBCUTANEOUS at 18:10

## 2023-12-31 RX ADMIN — INSULIN HUMAN 15 UNIT(S): 100 INJECTION, SOLUTION SUBCUTANEOUS at 12:55

## 2023-12-31 RX ADMIN — Medication 2 DROP(S): at 05:40

## 2023-12-31 RX ADMIN — Medication 1: at 06:07

## 2023-12-31 RX ADMIN — Medication 3 MILLILITER(S): at 05:45

## 2023-12-31 RX ADMIN — LIDOCAINE 1 PATCH: 4 CREAM TOPICAL at 12:56

## 2023-12-31 RX ADMIN — Medication 2 DROP(S): at 18:13

## 2023-12-31 RX ADMIN — SIMETHICONE 80 MILLIGRAM(S): 80 TABLET, CHEWABLE ORAL at 23:24

## 2023-12-31 RX ADMIN — PHENYLEPHRINE-SHARK LIVER OIL-MINERAL OIL-PETROLATUM RECTAL OINTMENT 1 APPLICATION(S): at 09:59

## 2023-12-31 RX ADMIN — NYSTATIN CREAM 1 APPLICATION(S): 100000 CREAM TOPICAL at 21:35

## 2023-12-31 RX ADMIN — SIMETHICONE 80 MILLIGRAM(S): 80 TABLET, CHEWABLE ORAL at 05:39

## 2023-12-31 RX ADMIN — INSULIN HUMAN 32 UNIT(S): 100 INJECTION, SOLUTION SUBCUTANEOUS at 06:07

## 2023-12-31 RX ADMIN — Medication 15 MILLILITER(S): at 12:56

## 2023-12-31 RX ADMIN — NYSTATIN CREAM 1 APPLICATION(S): 100000 CREAM TOPICAL at 05:40

## 2023-12-31 RX ADMIN — ZINC OXIDE 1 APPLICATION(S): 200 OINTMENT TOPICAL at 09:58

## 2023-12-31 RX ADMIN — GABAPENTIN 100 MILLIGRAM(S): 400 CAPSULE ORAL at 21:35

## 2023-12-31 RX ADMIN — CHLORHEXIDINE GLUCONATE 15 MILLILITER(S): 213 SOLUTION TOPICAL at 18:51

## 2023-12-31 RX ADMIN — Medication 5 MILLIGRAM(S): at 21:59

## 2023-12-31 RX ADMIN — Medication 1 TABLET(S): at 05:39

## 2023-12-31 RX ADMIN — LIDOCAINE 1 PATCH: 4 CREAM TOPICAL at 12:57

## 2023-12-31 RX ADMIN — PANTOPRAZOLE SODIUM 40 MILLIGRAM(S): 20 TABLET, DELAYED RELEASE ORAL at 08:34

## 2023-12-31 RX ADMIN — QUETIAPINE FUMARATE 12.5 MILLIGRAM(S): 200 TABLET, FILM COATED ORAL at 18:11

## 2023-12-31 RX ADMIN — INSULIN GLARGINE 19 UNIT(S): 100 INJECTION, SOLUTION SUBCUTANEOUS at 08:34

## 2023-12-31 RX ADMIN — Medication 2 DROP(S): at 12:59

## 2023-12-31 RX ADMIN — SIMETHICONE 80 MILLIGRAM(S): 80 TABLET, CHEWABLE ORAL at 18:10

## 2023-12-31 RX ADMIN — Medication 1 TABLET(S): at 18:11

## 2023-12-31 RX ADMIN — Medication 2: at 12:54

## 2023-12-31 RX ADMIN — NYSTATIN CREAM 1 APPLICATION(S): 100000 CREAM TOPICAL at 13:23

## 2023-12-31 RX ADMIN — SIMETHICONE 80 MILLIGRAM(S): 80 TABLET, CHEWABLE ORAL at 12:56

## 2023-12-31 RX ADMIN — Medication 3 MILLILITER(S): at 11:41

## 2023-12-31 RX ADMIN — Medication 1 APPLICATION(S): at 05:39

## 2023-12-31 NOTE — PROGRESS NOTE ADULT - ASSESSMENT
74 y/o F with PMHx of T2DM, HTN, HLD, anemia, hypothyroidism, RA, fibromyalgia, remote cerebral aneurysm repair, acute hypercapnic respiratory failure now with tracheostomy, PEG tube, and bedbound (trach change 8/18, PEG change 8/14), sacral pressure ulcer, BIBEMS from home for 6 day hx of bleeding from the tracheostomy and PEG tube with associated abdominal pain, recent history of auditory and visual hallucinations, and with chronic sacral decubitus ulcer. Exam notable for mild abdominal distention with LLQ and RLQ tenderness, tracheostomy in place with no obvious bleeding, PEG tube with scant amount of dried blood, at baseline neurologic status. Imaging showing no active bleeding around tracheostomy site, however was notable for mild thickening along PEG tube tract, sacral ulcer extending to the coccyx suggestive of possible osteomyelitis, and bibasilar patchy lung opacities with volume loss. Patient admitted for respiratory support, wound care of tracheostomy and PEG, and management of sacral osteomyelitis. No further Trach and peg site bleeding noted since admission.  Pelvic MRI imaging also suggestive of Acute OM. Patient placed on long-term antibiotics with Infectious disease guidance.  Additionally treated with a 7d course of Cefepime due to PSA and Serratia tracheitis on 11/8-->11/15 and then resumed cipro/keflex.  Hospital course c/b Delirium for which Seroquel was added and home Lexapro continued. Tracheostomy changed to #9 Cuffed [place] by ENT 11/3.  Despite trach change patient remained with persistent air leak.  On 11/19, the trach was again changed due to leak and loss of volumes on vent.  Trach was changed to #8 distal cuffed [place].  Patient seen by Dermatology for back lesions.  One diagnosed as a seborrheic keratitis and the other a dermatofibroma.  Intermittent abdominal pain throughout hospital course, at times relieved with gas/BM, w/u negative, seen by GI - no recs.  12/10 pt completed cipro and keflex for osteo treatment.  12/13 moderate amounts of secretions w/ blood - tranexamic acid neb given with notable improvement.  12/18 with blood tinged secretion again - TXA 250mg neb x2 doses.  12/19 spiked fever to 102 - pancultured, negative w/u.    12/30-12/31:  No acute events. Remains medically stable, DC planning in progress.  72 y/o F with PMHx of T2DM, HTN, HLD, anemia, hypothyroidism, RA, fibromyalgia, remote cerebral aneurysm repair, acute hypercapnic respiratory failure now with tracheostomy, PEG tube, and bedbound (trach change 8/18, PEG change 8/14), sacral pressure ulcer, BIBEMS from home for 6 day hx of bleeding from the tracheostomy and PEG tube with associated abdominal pain, recent history of auditory and visual hallucinations, and with chronic sacral decubitus ulcer. Exam notable for mild abdominal distention with LLQ and RLQ tenderness, tracheostomy in place with no obvious bleeding, PEG tube with scant amount of dried blood, at baseline neurologic status. Imaging showing no active bleeding around tracheostomy site, however was notable for mild thickening along PEG tube tract, sacral ulcer extending to the coccyx suggestive of possible osteomyelitis, and bibasilar patchy lung opacities with volume loss. Patient admitted for respiratory support, wound care of tracheostomy and PEG, and management of sacral osteomyelitis. No further Trach and peg site bleeding noted since admission.  Pelvic MRI imaging also suggestive of Acute OM. Patient placed on long-term antibiotics with Infectious disease guidance.  Additionally treated with a 7d course of Cefepime due to PSA and Serratia tracheitis on 11/8-->11/15 and then resumed cipro/keflex.  Hospital course c/b Delirium for which Seroquel was added and home Lexapro continued. Tracheostomy changed to #9 Cuffed [place] by ENT 11/3.  Despite trach change patient remained with persistent air leak.  On 11/19, the trach was again changed due to leak and loss of volumes on vent.  Trach was changed to #8 distal cuffed [place].  Patient seen by Dermatology for back lesions.  One diagnosed as a seborrheic keratitis and the other a dermatofibroma.  Intermittent abdominal pain throughout hospital course, at times relieved with gas/BM, w/u negative, seen by GI - no recs.  12/10 pt completed cipro and keflex for osteo treatment.  12/13 moderate amounts of secretions w/ blood - tranexamic acid neb given with notable improvement.  12/18 with blood tinged secretion again - TXA 250mg neb x2 doses.  12/19 spiked fever to 102 - pancultured, negative w/u.    12/30-12/31:  No acute events. Remains medically stable, DC planning in progress.

## 2023-12-31 NOTE — PROGRESS NOTE ADULT - PROBLEM SELECTOR PLAN 1
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 aureus  - Sputum Cx 11/6: + PSA and Serratia, Cefepime 11/8 -->11/15  - 11/26 sputum culture MDR pseudomonas - clinically stable, defer treatment unless decompensates as per ID  - 11/26 urine culture - enterococcus - no need to treat - cfu low, organism not significant as per ID  - 12/19 respiked fever - blood cultures NGTD - received dose of vanco and cefepime - d/c'd as w/u negative

## 2023-12-31 NOTE — PROGRESS NOTE ADULT - NS ATTEND AMEND GEN_ALL_CORE FT
73F with a h/o DM, HTN, HLD, anemia, hypothyroidism, RA, fibromyalgia, remote cerebral aneurysm with repair, hypercapnic respiratory failure s/p tracheostomy/PEG, bedbound, sacral pressure ulcer. Admitted w/ bleeding from tracheostomy and PEG w/ associated abdominal pain, recent hx of auditory/visual hallucinations. Imaging showed mild thickening along PEG tube tract and sacral ulcer extending to coccyx suggestive of acute osteomyelitis.      No acute events overnight  On physical exam she is awake, breathing comfortably on ventilator, bilateral breath sounds present, abd soft, non tender non distended, extremities warm and well perfused.  Remains on chronic mech vent, tolerating PS trials during the day for periods of time  Tolerating PEG feeds  Sacral OM s/p 6 week course of Cipro and Keflex as per ID - completed 12/10/23  c/w current pain regimen - in setting of fibromyalgia and pain from wound  Medically stable for discharge

## 2023-12-31 NOTE — PROGRESS NOTE ADULT - SUBJECTIVE AND OBJECTIVE BOX
Patient is a 73y old  Female who presents with a chief complaint of PEG Bleeding (22 Dec 2023 07:35)      Interval Events:    REVIEW OF SYSTEMS:  [ ] Positive  [ ] All other systems negative  [ ] Unable to assess ROS because ________    Vital Signs Last 24 Hrs  T(C): 36.5 (12-31-23 @ 06:00), Max: 36.9 (12-30-23 @ 13:24)  T(F): 97.7 (12-31-23 @ 06:00), Max: 98.4 (12-30-23 @ 13:24)  HR: 84 (12-31-23 @ 06:00) (78 - 95)  BP: 149/58 (12-31-23 @ 06:00) (144/78 - 149/58)  RR: 17 (12-31-23 @ 06:00) (15 - 22)  SpO2: 100% (12-31-23 @ 06:00) (100% - 100%)    PHYSICAL EXAM:    HEENT:   [X] Tracheostomy: #8 distal XLT Cuffed Shiley  [X] PERRL B/L; EOMI  [ ]No oral lesions  [X] Abnormal: missing; +loose bottom teeth, poor dentition     SKIN  [ ]No Rash  [ ] Abnormal  [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    CARDIAC  [X] Regular  [ ] Abnormal    PULMONARY  [ ] Bilateral Clear Breath Sounds  [ ] Normal Excursion  [X] Abnormal - Minimal Coarse BS bilaterally     GI  [X] PEG site c/d/i, no leakage noted      [X] +BS		              [X] Soft, nondistended, nontender	  [ ] Abnormal    MUSCULOSKELETAL                                   [X] Bedbound                 [ ] Abnormal    [ ] Ambulatory/OOB to chair                           EXTREMITIES                                         [X] Normal  [ ]Edema                           NEUROLOGIC  [ ] Normal, non focal  [X] Focal findings: awake and arousable to voice, follows commands on all 4 extremities; limited mobility of legs with partial B/L foot drop    PSYCHIATRIC  [X]Alert and appropriate  [ ] Sedated	 [ ]Agitated    :  Mcbride: [ ] Yes, if yes: Date of Placement:                   [X] No    LINES: Central Lines [ ] Yes, if yes: Date of Placement                                     [X] No  HOSPITAL MEDICATIONS:  MEDICATIONS  (STANDING):  albuterol/ipratropium for Nebulization 3 milliLiter(s) Nebulizer every 6 hours  amLODIPine   Tablet 10 milliGRAM(s) Oral daily  artificial  tears Solution 2 Drop(s) Both EYES four times a day  ascorbic acid 500 milliGRAM(s) Oral daily  calcium carbonate    500 mG (Tums) Chewable 1 Tablet(s) Chew every 12 hours  chlorhexidine 0.12% Liquid 15 milliLiter(s) Oral Mucosa every 12 hours  chlorhexidine 4% Liquid 1 Application(s) Topical <User Schedule>  dextrose 50% Injectable 12.5 Gram(s) IV Push once  dextrose 50% Injectable 25 Gram(s) IV Push once  escitalopram 10 milliGRAM(s) Oral <User Schedule>  fentaNYL   Patch  25 MICROgram(s)/Hr 1 Patch Transdermal every 72 hours  gabapentin Solution 100 milliGRAM(s) Enteral Tube at bedtime  glucagon  Injectable 1 milliGRAM(s) IntraMuscular once  hemorrhoidal Ointment      hemorrhoidal Ointment 1 Application(s) Rectal daily  insulin glargine Injectable (LANTUS) 19 Unit(s) SubCutaneous every morning  insulin lispro (ADMELOG) corrective regimen sliding scale   SubCutaneous every 6 hours  insulin regular  human recombinant 9 Unit(s) SubCutaneous <User Schedule>  insulin regular  human recombinant 15 Unit(s) SubCutaneous <User Schedule>  insulin regular  human recombinant 32 Unit(s) SubCutaneous <User Schedule>  levothyroxine 125 MICROGram(s) Oral <User Schedule>  lidocaine   4% Patch 1 Patch Transdermal daily  lidocaine   4% Patch 1 Patch Transdermal daily  lidocaine 2% Viscous 5 milliLiter(s) Mucosal two times a day  melatonin 5 milliGRAM(s) Oral at bedtime  melatonin 1 milliGRAM(s) Oral at bedtime  multivitamin/minerals/iron Oral Solution (CENTRUM) 15 milliLiter(s) Oral daily  nystatin Powder 1 Application(s) Topical every 8 hours  pantoprazole   Suspension 40 milliGRAM(s) Enteral Tube <User Schedule>  petrolatum Ophthalmic Ointment 1 Application(s) Both EYES four times a day  predniSONE   Tablet 5 milliGRAM(s) Oral daily  QUEtiapine 12.5 milliGRAM(s) Oral <User Schedule>  silver nitrate Applicator 1 Application(s) Topical once  simethicone 80 milliGRAM(s) Chew four times a day  zinc oxide 40% Paste 1 Application(s) Topical daily    MEDICATIONS  (PRN):  acetaminophen   Oral Liquid .. 1000 milliGRAM(s) Enteral Tube every 6 hours PRN Temp greater or equal to 38C (100.4F), Mild Pain (1 - 3)  benzocaine 20% Spray 1 Spray(s) Topical four times a day PRN Cough  diclofenac sodium 1% Gel 2 Gram(s) Topical four times a day PRN pain  diphenhydrAMINE Elixir 25 milliGRAM(s) Oral every 6 hours PRN Rash and/or Itching  guaifenesin/dextromethorphan Oral Liquid 10 milliLiter(s) Oral every 6 hours PRN Cough  oxyCODONE    Solution 2.5 milliGRAM(s) Oral every 6 hours PRN Moderate Pain (4 - 6)  sodium chloride 0.65% Nasal 1 Spray(s) Both Nostrils every 6 hours PRN Nasal Congestion      LABS:                  CAPILLARY BLOOD GLUCOSE    MICROBIOLOGY:     RADIOLOGY:  [ ] Reviewed and interpreted by me    Mode: AC/ CMV (Assist Control/ Continuous Mandatory Ventilation)  RR (machine): 12  TV (machine): 300  FiO2: 30  PEEP: 5  MAP: 9  PIP: 28   Patient is a 73y old  Female who presents with a chief complaint of PEG Bleeding (22 Dec 2023 07:35)      Interval Events: No acute events     REVIEW OF SYSTEMS:  [ ] Positive  [x ] All other systems negative  [ ] Unable to assess ROS because ________    Vital Signs Last 24 Hrs  T(C): 36.5 (12-31-23 @ 06:00), Max: 36.9 (12-30-23 @ 13:24)  T(F): 97.7 (12-31-23 @ 06:00), Max: 98.4 (12-30-23 @ 13:24)  HR: 84 (12-31-23 @ 06:00) (78 - 95)  BP: 149/58 (12-31-23 @ 06:00) (144/78 - 149/58)  RR: 17 (12-31-23 @ 06:00) (15 - 22)  SpO2: 100% (12-31-23 @ 06:00) (100% - 100%)    PHYSICAL EXAM:    HEENT:   [X] Tracheostomy: #8 distal XLT Cuffed Shiley  [X] PERRL B/L; EOMI  [ ]No oral lesions  [X] Abnormal: missing; +loose bottom teeth, poor dentition     SKIN  [ ]No Rash  [ ] Abnormal  [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    CARDIAC  [X] Regular  [ ] Abnormal    PULMONARY  [ ] Bilateral Clear Breath Sounds  [ ] Normal Excursion  [X] Abnormal - Minimal Coarse BS bilaterally     GI  [X] PEG site c/d/i, no leakage noted      [X] +BS		              [X] Soft, nondistended, nontender	  [ ] Abnormal    MUSCULOSKELETAL                                   [X] Bedbound                 [ ] Abnormal    [ ] Ambulatory/OOB to chair                           EXTREMITIES                                         [X] Normal  [ ]Edema                           NEUROLOGIC  [ ] Normal, non focal  [X] Focal findings: awake and arousable to voice, follows commands on all 4 extremities; limited mobility of legs with partial B/L foot drop    PSYCHIATRIC  [X]Alert and appropriate  [ ] Sedated	 [ ]Agitated    :  Mcbride: [ ] Yes, if yes: Date of Placement:                   [X] No    LINES: Central Lines [ ] Yes, if yes: Date of Placement                                     [X] No  HOSPITAL MEDICATIONS:  MEDICATIONS  (STANDING):  albuterol/ipratropium for Nebulization 3 milliLiter(s) Nebulizer every 6 hours  amLODIPine   Tablet 10 milliGRAM(s) Oral daily  artificial  tears Solution 2 Drop(s) Both EYES four times a day  ascorbic acid 500 milliGRAM(s) Oral daily  calcium carbonate    500 mG (Tums) Chewable 1 Tablet(s) Chew every 12 hours  chlorhexidine 0.12% Liquid 15 milliLiter(s) Oral Mucosa every 12 hours  chlorhexidine 4% Liquid 1 Application(s) Topical <User Schedule>  dextrose 50% Injectable 12.5 Gram(s) IV Push once  dextrose 50% Injectable 25 Gram(s) IV Push once  escitalopram 10 milliGRAM(s) Oral <User Schedule>  fentaNYL   Patch  25 MICROgram(s)/Hr 1 Patch Transdermal every 72 hours  gabapentin Solution 100 milliGRAM(s) Enteral Tube at bedtime  glucagon  Injectable 1 milliGRAM(s) IntraMuscular once  hemorrhoidal Ointment      hemorrhoidal Ointment 1 Application(s) Rectal daily  insulin glargine Injectable (LANTUS) 19 Unit(s) SubCutaneous every morning  insulin lispro (ADMELOG) corrective regimen sliding scale   SubCutaneous every 6 hours  insulin regular  human recombinant 9 Unit(s) SubCutaneous <User Schedule>  insulin regular  human recombinant 15 Unit(s) SubCutaneous <User Schedule>  insulin regular  human recombinant 32 Unit(s) SubCutaneous <User Schedule>  levothyroxine 125 MICROGram(s) Oral <User Schedule>  lidocaine   4% Patch 1 Patch Transdermal daily  lidocaine   4% Patch 1 Patch Transdermal daily  lidocaine 2% Viscous 5 milliLiter(s) Mucosal two times a day  melatonin 5 milliGRAM(s) Oral at bedtime  melatonin 1 milliGRAM(s) Oral at bedtime  multivitamin/minerals/iron Oral Solution (CENTRUM) 15 milliLiter(s) Oral daily  nystatin Powder 1 Application(s) Topical every 8 hours  pantoprazole   Suspension 40 milliGRAM(s) Enteral Tube <User Schedule>  petrolatum Ophthalmic Ointment 1 Application(s) Both EYES four times a day  predniSONE   Tablet 5 milliGRAM(s) Oral daily  QUEtiapine 12.5 milliGRAM(s) Oral <User Schedule>  silver nitrate Applicator 1 Application(s) Topical once  simethicone 80 milliGRAM(s) Chew four times a day  zinc oxide 40% Paste 1 Application(s) Topical daily    MEDICATIONS  (PRN):  acetaminophen   Oral Liquid .. 1000 milliGRAM(s) Enteral Tube every 6 hours PRN Temp greater or equal to 38C (100.4F), Mild Pain (1 - 3)  benzocaine 20% Spray 1 Spray(s) Topical four times a day PRN Cough  diclofenac sodium 1% Gel 2 Gram(s) Topical four times a day PRN pain  diphenhydrAMINE Elixir 25 milliGRAM(s) Oral every 6 hours PRN Rash and/or Itching  guaifenesin/dextromethorphan Oral Liquid 10 milliLiter(s) Oral every 6 hours PRN Cough  oxyCODONE    Solution 2.5 milliGRAM(s) Oral every 6 hours PRN Moderate Pain (4 - 6)  sodium chloride 0.65% Nasal 1 Spray(s) Both Nostrils every 6 hours PRN Nasal Congestion      LABS:                  CAPILLARY BLOOD GLUCOSE    MICROBIOLOGY:     RADIOLOGY:  [ ] Reviewed and interpreted by me    Mode: AC/ CMV (Assist Control/ Continuous Mandatory Ventilation)  RR (machine): 12  TV (machine): 300  FiO2: 30  PEEP: 5  MAP: 9  PIP: 28

## 2024-01-01 ENCOUNTER — INPATIENT (INPATIENT)
Facility: HOSPITAL | Age: 74
LOS: 58 days | DRG: 189 | End: 2024-12-05
Attending: STUDENT IN AN ORGANIZED HEALTH CARE EDUCATION/TRAINING PROGRAM | Admitting: STUDENT IN AN ORGANIZED HEALTH CARE EDUCATION/TRAINING PROGRAM
Payer: MEDICARE

## 2024-01-01 ENCOUNTER — EMERGENCY (EMERGENCY)
Facility: HOSPITAL | Age: 74
LOS: 1 days | Discharge: HOME | End: 2024-01-01
Attending: EMERGENCY MEDICINE
Payer: MEDICARE

## 2024-01-01 VITALS
HEART RATE: 78 BPM | RESPIRATION RATE: 15 BRPM | OXYGEN SATURATION: 100 % | DIASTOLIC BLOOD PRESSURE: 59 MMHG | SYSTOLIC BLOOD PRESSURE: 136 MMHG

## 2024-01-01 VITALS
DIASTOLIC BLOOD PRESSURE: 71 MMHG | RESPIRATION RATE: 14 BRPM | WEIGHT: 119.93 LBS | SYSTOLIC BLOOD PRESSURE: 122 MMHG | HEIGHT: 59 IN | OXYGEN SATURATION: 96 % | HEART RATE: 105 BPM

## 2024-01-01 VITALS — OXYGEN SATURATION: 100 % | HEART RATE: 95 BPM | WEIGHT: 123.46 LBS

## 2024-01-01 VITALS — OXYGEN SATURATION: 88 %

## 2024-01-01 DIAGNOSIS — B99.9 UNSPECIFIED INFECTIOUS DISEASE: ICD-10-CM

## 2024-01-01 DIAGNOSIS — R13.10 DYSPHAGIA, UNSPECIFIED: ICD-10-CM

## 2024-01-01 DIAGNOSIS — E11.9 TYPE 2 DIABETES MELLITUS WITHOUT COMPLICATIONS: ICD-10-CM

## 2024-01-01 DIAGNOSIS — H91.90 UNSPECIFIED HEARING LOSS, UNSPECIFIED EAR: ICD-10-CM

## 2024-01-01 DIAGNOSIS — E11.65 TYPE 2 DIABETES MELLITUS WITH HYPERGLYCEMIA: ICD-10-CM

## 2024-01-01 DIAGNOSIS — Z98.890 OTHER SPECIFIED POSTPROCEDURAL STATES: Chronic | ICD-10-CM

## 2024-01-01 DIAGNOSIS — Z87.19 PERSONAL HISTORY OF OTHER DISEASES OF THE DIGESTIVE SYSTEM: ICD-10-CM

## 2024-01-01 DIAGNOSIS — E27.49 OTHER ADRENOCORTICAL INSUFFICIENCY: ICD-10-CM

## 2024-01-01 DIAGNOSIS — T14.8XXA OTHER INJURY OF UNSPECIFIED BODY REGION, INITIAL ENCOUNTER: ICD-10-CM

## 2024-01-01 DIAGNOSIS — I10 ESSENTIAL (PRIMARY) HYPERTENSION: ICD-10-CM

## 2024-01-01 DIAGNOSIS — E78.49 OTHER HYPERLIPIDEMIA: ICD-10-CM

## 2024-01-01 DIAGNOSIS — Z93.1 GASTROSTOMY STATUS: Chronic | ICD-10-CM

## 2024-01-01 DIAGNOSIS — Z71.89 OTHER SPECIFIED COUNSELING: ICD-10-CM

## 2024-01-01 DIAGNOSIS — M06.9 RHEUMATOID ARTHRITIS, UNSPECIFIED: ICD-10-CM

## 2024-01-01 DIAGNOSIS — E87.1 HYPO-OSMOLALITY AND HYPONATREMIA: ICD-10-CM

## 2024-01-01 DIAGNOSIS — E03.9 HYPOTHYROIDISM, UNSPECIFIED: ICD-10-CM

## 2024-01-01 DIAGNOSIS — R52 PAIN, UNSPECIFIED: ICD-10-CM

## 2024-01-01 DIAGNOSIS — J80 ACUTE RESPIRATORY DISTRESS SYNDROME: ICD-10-CM

## 2024-01-01 DIAGNOSIS — M79.7 FIBROMYALGIA: ICD-10-CM

## 2024-01-01 DIAGNOSIS — J96.01 ACUTE RESPIRATORY FAILURE WITH HYPOXIA: ICD-10-CM

## 2024-01-01 DIAGNOSIS — Z29.9 ENCOUNTER FOR PROPHYLACTIC MEASURES, UNSPECIFIED: ICD-10-CM

## 2024-01-01 DIAGNOSIS — Z51.5 ENCOUNTER FOR PALLIATIVE CARE: ICD-10-CM

## 2024-01-01 LAB
-  AMPICILLIN/SULBACTAM: SIGNIFICANT CHANGE UP
-  AMPICILLIN: SIGNIFICANT CHANGE UP
-  AMPICILLIN: SIGNIFICANT CHANGE UP
-  AZTREONAM: SIGNIFICANT CHANGE UP
-  CEFAZOLIN: SIGNIFICANT CHANGE UP
-  CEFEPIME: SIGNIFICANT CHANGE UP
-  CEFTAZIDIME/AVIBACTAM: SIGNIFICANT CHANGE UP
-  CEFTAZIDIME: SIGNIFICANT CHANGE UP
-  CEFTOLOZANE/TAZOBACTAM: SIGNIFICANT CHANGE UP
-  CEFTRIAXONE: SIGNIFICANT CHANGE UP
-  CEFUROXIME: SIGNIFICANT CHANGE UP
-  CIPROFLOXACIN: SIGNIFICANT CHANGE UP
-  DAPTOMYCIN: SIGNIFICANT CHANGE UP
-  ERTAPENEM: SIGNIFICANT CHANGE UP
-  GENTAMICIN: SIGNIFICANT CHANGE UP
-  IMIPENEM: SIGNIFICANT CHANGE UP
-  LEVOFLOXACIN: SIGNIFICANT CHANGE UP
-  LINEZOLID: SIGNIFICANT CHANGE UP
-  MEROPENEM: SIGNIFICANT CHANGE UP
-  NITROFURANTOIN: SIGNIFICANT CHANGE UP
-  NITROFURANTOIN: SIGNIFICANT CHANGE UP
-  PIPERACILLIN/TAZOBACTAM: SIGNIFICANT CHANGE UP
-  STAPHYLOCOCCUS EPIDERMIDIS, METHICILLIN RESISTANT: SIGNIFICANT CHANGE UP
-  TETRACYCLINE: SIGNIFICANT CHANGE UP
-  TOBRAMYCIN: SIGNIFICANT CHANGE UP
-  TRIMETHOPRIM/SULFAMETHOXAZOLE: SIGNIFICANT CHANGE UP
-  VANCOMYCIN: SIGNIFICANT CHANGE UP
A1C WITH ESTIMATED AVERAGE GLUCOSE RESULT: 5.8 % — HIGH (ref 4–5.6)
ADD ON TEST-SPECIMEN IN LAB: SIGNIFICANT CHANGE UP
ALBUMIN SERPL ELPH-MCNC: 1.7 G/DL — LOW (ref 3.3–5)
ALBUMIN SERPL ELPH-MCNC: 1.8 G/DL — LOW (ref 3.3–5)
ALBUMIN SERPL ELPH-MCNC: 1.9 G/DL — LOW (ref 3.3–5)
ALBUMIN SERPL ELPH-MCNC: 1.9 G/DL — LOW (ref 3.3–5)
ALBUMIN SERPL ELPH-MCNC: 2 G/DL — LOW (ref 3.3–5)
ALBUMIN SERPL ELPH-MCNC: 2.1 G/DL — LOW (ref 3.3–5)
ALBUMIN SERPL ELPH-MCNC: 2.2 G/DL — LOW (ref 3.3–5)
ALBUMIN SERPL ELPH-MCNC: 2.3 G/DL — LOW (ref 3.3–5)
ALBUMIN SERPL ELPH-MCNC: 2.4 G/DL — LOW (ref 3.3–5)
ALBUMIN SERPL ELPH-MCNC: 2.5 G/DL — LOW (ref 3.3–5)
ALBUMIN SERPL ELPH-MCNC: 2.5 G/DL — LOW (ref 3.3–5)
ALBUMIN SERPL ELPH-MCNC: 2.6 G/DL — LOW (ref 3.3–5)
ALBUMIN SERPL ELPH-MCNC: 2.7 G/DL — LOW (ref 3.3–5)
ALBUMIN SERPL ELPH-MCNC: 2.7 G/DL — LOW (ref 3.3–5)
ALBUMIN SERPL ELPH-MCNC: 2.9 G/DL — LOW (ref 3.3–5)
ALP SERPL-CCNC: 101 U/L — SIGNIFICANT CHANGE UP (ref 40–120)
ALP SERPL-CCNC: 101 U/L — SIGNIFICANT CHANGE UP (ref 40–120)
ALP SERPL-CCNC: 103 U/L — SIGNIFICANT CHANGE UP (ref 40–120)
ALP SERPL-CCNC: 105 U/L — SIGNIFICANT CHANGE UP (ref 40–120)
ALP SERPL-CCNC: 106 U/L — SIGNIFICANT CHANGE UP (ref 40–120)
ALP SERPL-CCNC: 106 U/L — SIGNIFICANT CHANGE UP (ref 40–120)
ALP SERPL-CCNC: 108 U/L — SIGNIFICANT CHANGE UP (ref 40–120)
ALP SERPL-CCNC: 109 U/L — SIGNIFICANT CHANGE UP (ref 40–120)
ALP SERPL-CCNC: 110 U/L — SIGNIFICANT CHANGE UP (ref 40–120)
ALP SERPL-CCNC: 111 U/L — SIGNIFICANT CHANGE UP (ref 40–120)
ALP SERPL-CCNC: 113 U/L — SIGNIFICANT CHANGE UP (ref 40–120)
ALP SERPL-CCNC: 114 U/L — SIGNIFICANT CHANGE UP (ref 40–120)
ALP SERPL-CCNC: 115 U/L — SIGNIFICANT CHANGE UP (ref 40–120)
ALP SERPL-CCNC: 119 U/L — SIGNIFICANT CHANGE UP (ref 40–120)
ALP SERPL-CCNC: 119 U/L — SIGNIFICANT CHANGE UP (ref 40–120)
ALP SERPL-CCNC: 124 U/L — HIGH (ref 40–120)
ALP SERPL-CCNC: 132 U/L — HIGH (ref 40–120)
ALP SERPL-CCNC: 133 U/L — HIGH (ref 40–120)
ALP SERPL-CCNC: 139 U/L — HIGH (ref 40–120)
ALP SERPL-CCNC: 150 U/L — HIGH (ref 40–120)
ALP SERPL-CCNC: 161 U/L — HIGH (ref 40–120)
ALP SERPL-CCNC: 162 U/L — HIGH (ref 40–120)
ALP SERPL-CCNC: 164 U/L — HIGH (ref 40–120)
ALP SERPL-CCNC: 165 U/L — HIGH (ref 40–120)
ALP SERPL-CCNC: 166 U/L — HIGH (ref 40–120)
ALP SERPL-CCNC: 75 U/L — SIGNIFICANT CHANGE UP (ref 40–120)
ALP SERPL-CCNC: 75 U/L — SIGNIFICANT CHANGE UP (ref 40–120)
ALP SERPL-CCNC: 84 U/L — SIGNIFICANT CHANGE UP (ref 40–120)
ALP SERPL-CCNC: 92 U/L — SIGNIFICANT CHANGE UP (ref 40–120)
ALP SERPL-CCNC: 92 U/L — SIGNIFICANT CHANGE UP (ref 40–120)
ALP SERPL-CCNC: 96 U/L — SIGNIFICANT CHANGE UP (ref 40–120)
ALP SERPL-CCNC: 98 U/L — SIGNIFICANT CHANGE UP (ref 40–120)
ALT FLD-CCNC: 10 U/L — SIGNIFICANT CHANGE UP (ref 10–45)
ALT FLD-CCNC: 10 U/L — SIGNIFICANT CHANGE UP (ref 10–45)
ALT FLD-CCNC: 12 U/L — SIGNIFICANT CHANGE UP (ref 10–45)
ALT FLD-CCNC: 13 U/L — SIGNIFICANT CHANGE UP (ref 10–45)
ALT FLD-CCNC: 13 U/L — SIGNIFICANT CHANGE UP (ref 10–45)
ALT FLD-CCNC: 14 U/L — SIGNIFICANT CHANGE UP (ref 10–45)
ALT FLD-CCNC: 15 U/L — SIGNIFICANT CHANGE UP (ref 10–45)
ALT FLD-CCNC: 15 U/L — SIGNIFICANT CHANGE UP (ref 10–45)
ALT FLD-CCNC: 16 U/L — SIGNIFICANT CHANGE UP (ref 10–45)
ALT FLD-CCNC: 17 U/L — SIGNIFICANT CHANGE UP (ref 10–45)
ALT FLD-CCNC: 18 U/L — SIGNIFICANT CHANGE UP (ref 10–45)
ALT FLD-CCNC: 19 U/L — SIGNIFICANT CHANGE UP (ref 10–45)
ALT FLD-CCNC: 19 U/L — SIGNIFICANT CHANGE UP (ref 10–45)
ALT FLD-CCNC: 20 U/L — SIGNIFICANT CHANGE UP (ref 10–45)
ALT FLD-CCNC: 20 U/L — SIGNIFICANT CHANGE UP (ref 10–45)
ALT FLD-CCNC: 22 U/L — SIGNIFICANT CHANGE UP (ref 10–45)
ALT FLD-CCNC: 22 U/L — SIGNIFICANT CHANGE UP (ref 10–45)
ALT FLD-CCNC: 23 U/L — SIGNIFICANT CHANGE UP (ref 10–45)
ALT FLD-CCNC: 24 U/L — SIGNIFICANT CHANGE UP (ref 10–45)
ALT FLD-CCNC: 26 U/L — SIGNIFICANT CHANGE UP (ref 10–45)
ALT FLD-CCNC: 27 U/L — SIGNIFICANT CHANGE UP (ref 10–45)
ALT FLD-CCNC: 28 U/L — SIGNIFICANT CHANGE UP (ref 10–45)
ALT FLD-CCNC: 32 U/L — SIGNIFICANT CHANGE UP (ref 10–45)
AMMONIA BLD-MCNC: 37 UMOL/L — SIGNIFICANT CHANGE UP (ref 11–55)
ANION GAP SERPL CALC-SCNC: 10 MMOL/L — SIGNIFICANT CHANGE UP (ref 5–17)
ANION GAP SERPL CALC-SCNC: 11 MMOL/L — SIGNIFICANT CHANGE UP (ref 5–17)
ANION GAP SERPL CALC-SCNC: 12 MMOL/L — SIGNIFICANT CHANGE UP (ref 5–17)
ANION GAP SERPL CALC-SCNC: 13 MMOL/L — SIGNIFICANT CHANGE UP (ref 5–17)
ANION GAP SERPL CALC-SCNC: 14 MMOL/L — SIGNIFICANT CHANGE UP (ref 5–17)
ANION GAP SERPL CALC-SCNC: 15 MMOL/L — SIGNIFICANT CHANGE UP (ref 5–17)
ANION GAP SERPL CALC-SCNC: 16 MMOL/L — SIGNIFICANT CHANGE UP (ref 5–17)
ANION GAP SERPL CALC-SCNC: 16 MMOL/L — SIGNIFICANT CHANGE UP (ref 5–17)
ANION GAP SERPL CALC-SCNC: 17 MMOL/L — SIGNIFICANT CHANGE UP (ref 5–17)
ANION GAP SERPL CALC-SCNC: 19 MMOL/L — HIGH (ref 5–17)
ANION GAP SERPL CALC-SCNC: 20 MMOL/L — HIGH (ref 5–17)
ANION GAP SERPL CALC-SCNC: 21 MMOL/L — HIGH (ref 5–17)
ANION GAP SERPL CALC-SCNC: 21 MMOL/L — HIGH (ref 5–17)
ANION GAP SERPL CALC-SCNC: 22 MMOL/L — HIGH (ref 5–17)
ANION GAP SERPL CALC-SCNC: 22 MMOL/L — HIGH (ref 5–17)
ANION GAP SERPL CALC-SCNC: 8 MMOL/L — SIGNIFICANT CHANGE UP (ref 5–17)
ANION GAP SERPL CALC-SCNC: 9 MMOL/L — SIGNIFICANT CHANGE UP (ref 5–17)
ANION GAP SERPL CALC-SCNC: 9 MMOL/L — SIGNIFICANT CHANGE UP (ref 5–17)
ANISOCYTOSIS BLD QL: SLIGHT — SIGNIFICANT CHANGE UP
APPEARANCE UR: ABNORMAL
APPEARANCE UR: CLEAR — SIGNIFICANT CHANGE UP
APTT BLD: 30.5 SEC — SIGNIFICANT CHANGE UP (ref 24.5–35.6)
APTT BLD: 31.3 SEC — SIGNIFICANT CHANGE UP (ref 24.5–35.6)
AST SERPL-CCNC: 19 U/L — SIGNIFICANT CHANGE UP (ref 10–40)
AST SERPL-CCNC: 20 U/L — SIGNIFICANT CHANGE UP (ref 10–40)
AST SERPL-CCNC: 22 U/L — SIGNIFICANT CHANGE UP (ref 10–40)
AST SERPL-CCNC: 22 U/L — SIGNIFICANT CHANGE UP (ref 10–40)
AST SERPL-CCNC: 23 U/L — SIGNIFICANT CHANGE UP (ref 10–40)
AST SERPL-CCNC: 23 U/L — SIGNIFICANT CHANGE UP (ref 10–40)
AST SERPL-CCNC: 24 U/L — SIGNIFICANT CHANGE UP (ref 10–40)
AST SERPL-CCNC: 24 U/L — SIGNIFICANT CHANGE UP (ref 10–40)
AST SERPL-CCNC: 25 U/L — SIGNIFICANT CHANGE UP (ref 10–40)
AST SERPL-CCNC: 26 U/L — SIGNIFICANT CHANGE UP (ref 10–40)
AST SERPL-CCNC: 26 U/L — SIGNIFICANT CHANGE UP (ref 10–40)
AST SERPL-CCNC: 27 U/L — SIGNIFICANT CHANGE UP (ref 10–40)
AST SERPL-CCNC: 28 U/L — SIGNIFICANT CHANGE UP (ref 10–40)
AST SERPL-CCNC: 28 U/L — SIGNIFICANT CHANGE UP (ref 10–40)
AST SERPL-CCNC: 29 U/L — SIGNIFICANT CHANGE UP (ref 10–40)
AST SERPL-CCNC: 30 U/L — SIGNIFICANT CHANGE UP (ref 10–40)
AST SERPL-CCNC: 31 U/L — SIGNIFICANT CHANGE UP (ref 10–40)
AST SERPL-CCNC: 33 U/L — SIGNIFICANT CHANGE UP (ref 10–40)
AST SERPL-CCNC: 36 U/L — SIGNIFICANT CHANGE UP (ref 10–40)
AST SERPL-CCNC: 39 U/L — SIGNIFICANT CHANGE UP (ref 10–40)
AST SERPL-CCNC: 40 U/L — SIGNIFICANT CHANGE UP (ref 10–40)
AST SERPL-CCNC: 47 U/L — HIGH (ref 10–40)
AST SERPL-CCNC: 50 U/L — HIGH (ref 10–40)
AST SERPL-CCNC: 53 U/L — HIGH (ref 10–40)
AST SERPL-CCNC: 76 U/L — HIGH (ref 10–40)
BACTERIA # UR AUTO: ABNORMAL /HPF
BACTERIA # UR AUTO: NEGATIVE /HPF — SIGNIFICANT CHANGE UP
BASE EXCESS BLDV CALC-SCNC: -4.5 MMOL/L — LOW (ref -2–3)
BASO STIPL BLD QL SMEAR: PRESENT — SIGNIFICANT CHANGE UP
BASOPHILS # BLD AUTO: 0 K/UL — SIGNIFICANT CHANGE UP (ref 0–0.2)
BASOPHILS # BLD AUTO: 0.01 K/UL — SIGNIFICANT CHANGE UP (ref 0–0.2)
BASOPHILS # BLD AUTO: 0.01 K/UL — SIGNIFICANT CHANGE UP (ref 0–0.2)
BASOPHILS # BLD AUTO: 0.02 K/UL — SIGNIFICANT CHANGE UP (ref 0–0.2)
BASOPHILS # BLD AUTO: 0.02 K/UL — SIGNIFICANT CHANGE UP (ref 0–0.2)
BASOPHILS # BLD AUTO: 0.03 K/UL — SIGNIFICANT CHANGE UP (ref 0–0.2)
BASOPHILS # BLD AUTO: 0.03 K/UL — SIGNIFICANT CHANGE UP (ref 0–0.2)
BASOPHILS # BLD AUTO: 0.07 K/UL — SIGNIFICANT CHANGE UP (ref 0–0.2)
BASOPHILS # BLD AUTO: 0.13 K/UL — SIGNIFICANT CHANGE UP (ref 0–0.2)
BASOPHILS NFR BLD AUTO: 0 % — SIGNIFICANT CHANGE UP (ref 0–2)
BASOPHILS NFR BLD AUTO: 0.2 % — SIGNIFICANT CHANGE UP (ref 0–2)
BASOPHILS NFR BLD AUTO: 0.3 % — SIGNIFICANT CHANGE UP (ref 0–2)
BASOPHILS NFR BLD AUTO: 0.9 % — SIGNIFICANT CHANGE UP (ref 0–2)
BASOPHILS NFR BLD AUTO: 0.9 % — SIGNIFICANT CHANGE UP (ref 0–2)
BILIRUB SERPL-MCNC: 0.3 MG/DL — SIGNIFICANT CHANGE UP (ref 0.2–1.2)
BILIRUB SERPL-MCNC: 0.4 MG/DL — SIGNIFICANT CHANGE UP (ref 0.2–1.2)
BILIRUB SERPL-MCNC: 0.5 MG/DL — SIGNIFICANT CHANGE UP (ref 0.2–1.2)
BILIRUB SERPL-MCNC: 0.5 MG/DL — SIGNIFICANT CHANGE UP (ref 0.2–1.2)
BILIRUB SERPL-MCNC: 0.6 MG/DL — SIGNIFICANT CHANGE UP (ref 0.2–1.2)
BILIRUB SERPL-MCNC: 0.7 MG/DL — SIGNIFICANT CHANGE UP (ref 0.2–1.2)
BILIRUB SERPL-MCNC: 0.9 MG/DL — SIGNIFICANT CHANGE UP (ref 0.2–1.2)
BILIRUB SERPL-MCNC: 1 MG/DL — SIGNIFICANT CHANGE UP (ref 0.2–1.2)
BILIRUB SERPL-MCNC: 1.1 MG/DL — SIGNIFICANT CHANGE UP (ref 0.2–1.2)
BILIRUB SERPL-MCNC: 1.2 MG/DL — SIGNIFICANT CHANGE UP (ref 0.2–1.2)
BILIRUB SERPL-MCNC: 1.3 MG/DL — HIGH (ref 0.2–1.2)
BILIRUB SERPL-MCNC: 1.3 MG/DL — HIGH (ref 0.2–1.2)
BILIRUB UR-MCNC: ABNORMAL
BILIRUB UR-MCNC: ABNORMAL
BILIRUB UR-MCNC: NEGATIVE — SIGNIFICANT CHANGE UP
BLANDM BLD POS QL PROBE: SIGNIFICANT CHANGE UP
BLD GP AB SCN SERPL QL: POSITIVE — SIGNIFICANT CHANGE UP
BUN SERPL-MCNC: 10 MG/DL — SIGNIFICANT CHANGE UP (ref 7–23)
BUN SERPL-MCNC: 11 MG/DL — SIGNIFICANT CHANGE UP (ref 7–23)
BUN SERPL-MCNC: 12 MG/DL — SIGNIFICANT CHANGE UP (ref 7–23)
BUN SERPL-MCNC: 13 MG/DL — SIGNIFICANT CHANGE UP (ref 7–23)
BUN SERPL-MCNC: 13 MG/DL — SIGNIFICANT CHANGE UP (ref 7–23)
BUN SERPL-MCNC: 15 MG/DL — SIGNIFICANT CHANGE UP (ref 7–23)
BUN SERPL-MCNC: 16 MG/DL — SIGNIFICANT CHANGE UP (ref 7–23)
BUN SERPL-MCNC: 17 MG/DL — SIGNIFICANT CHANGE UP (ref 7–23)
BUN SERPL-MCNC: 18 MG/DL — SIGNIFICANT CHANGE UP (ref 7–23)
BUN SERPL-MCNC: 18 MG/DL — SIGNIFICANT CHANGE UP (ref 7–23)
BUN SERPL-MCNC: 19 MG/DL — SIGNIFICANT CHANGE UP (ref 7–23)
BUN SERPL-MCNC: 20 MG/DL — SIGNIFICANT CHANGE UP (ref 7–23)
BUN SERPL-MCNC: 21 MG/DL — SIGNIFICANT CHANGE UP (ref 7–23)
BUN SERPL-MCNC: 22 MG/DL — SIGNIFICANT CHANGE UP (ref 7–23)
BUN SERPL-MCNC: 22 MG/DL — SIGNIFICANT CHANGE UP (ref 7–23)
BUN SERPL-MCNC: 23 MG/DL — SIGNIFICANT CHANGE UP (ref 7–23)
BUN SERPL-MCNC: 24 MG/DL — HIGH (ref 7–23)
BUN SERPL-MCNC: 25 MG/DL — HIGH (ref 7–23)
BUN SERPL-MCNC: 31 MG/DL — HIGH (ref 7–23)
BUN SERPL-MCNC: 38 MG/DL — HIGH (ref 7–23)
BUN SERPL-MCNC: 46 MG/DL — HIGH (ref 7–23)
BUN SERPL-MCNC: 52 MG/DL — HIGH (ref 7–23)
BUN SERPL-MCNC: 56 MG/DL — HIGH (ref 7–23)
BUN SERPL-MCNC: 58 MG/DL — HIGH (ref 7–23)
BUN SERPL-MCNC: 60 MG/DL — HIGH (ref 7–23)
BUN SERPL-MCNC: 61 MG/DL — HIGH (ref 7–23)
BUN SERPL-MCNC: 62 MG/DL — HIGH (ref 7–23)
BUN SERPL-MCNC: 65 MG/DL — HIGH (ref 7–23)
BUN SERPL-MCNC: 7 MG/DL — SIGNIFICANT CHANGE UP (ref 7–23)
BUN SERPL-MCNC: 8 MG/DL — SIGNIFICANT CHANGE UP (ref 7–23)
BUN SERPL-MCNC: 9 MG/DL — SIGNIFICANT CHANGE UP (ref 7–23)
C DIFF GDH STL QL: NEGATIVE — SIGNIFICANT CHANGE UP
C DIFF GDH STL QL: POSITIVE — SIGNIFICANT CHANGE UP
C DIFF GDH STL QL: SIGNIFICANT CHANGE UP
C DIFF GDH STL QL: SIGNIFICANT CHANGE UP
CALCIUM SERPL-MCNC: 7.2 MG/DL — LOW (ref 8.4–10.5)
CALCIUM SERPL-MCNC: 7.4 MG/DL — LOW (ref 8.4–10.5)
CALCIUM SERPL-MCNC: 7.6 MG/DL — LOW (ref 8.4–10.5)
CALCIUM SERPL-MCNC: 7.7 MG/DL — LOW (ref 8.4–10.5)
CALCIUM SERPL-MCNC: 7.8 MG/DL — LOW (ref 8.4–10.5)
CALCIUM SERPL-MCNC: 7.9 MG/DL — LOW (ref 8.4–10.5)
CALCIUM SERPL-MCNC: 7.9 MG/DL — LOW (ref 8.4–10.5)
CALCIUM SERPL-MCNC: 8 MG/DL — LOW (ref 8.4–10.5)
CALCIUM SERPL-MCNC: 8.1 MG/DL — LOW (ref 8.4–10.5)
CALCIUM SERPL-MCNC: 8.2 MG/DL — LOW (ref 8.4–10.5)
CALCIUM SERPL-MCNC: 8.3 MG/DL — LOW (ref 8.4–10.5)
CALCIUM SERPL-MCNC: 8.4 MG/DL — SIGNIFICANT CHANGE UP (ref 8.4–10.5)
CALCIUM SERPL-MCNC: 8.5 MG/DL — SIGNIFICANT CHANGE UP (ref 8.4–10.5)
CALCIUM SERPL-MCNC: 8.6 MG/DL — SIGNIFICANT CHANGE UP (ref 8.4–10.5)
CALCIUM SERPL-MCNC: 8.7 MG/DL — SIGNIFICANT CHANGE UP (ref 8.4–10.5)
CALCIUM SERPL-MCNC: 8.8 MG/DL — SIGNIFICANT CHANGE UP (ref 8.4–10.5)
CALCIUM SERPL-MCNC: 8.8 MG/DL — SIGNIFICANT CHANGE UP (ref 8.4–10.5)
CALCIUM SERPL-MCNC: 8.9 MG/DL — SIGNIFICANT CHANGE UP (ref 8.4–10.5)
CALCIUM SERPL-MCNC: 9.1 MG/DL — SIGNIFICANT CHANGE UP (ref 8.4–10.5)
CALCIUM SERPL-MCNC: 9.5 MG/DL — SIGNIFICANT CHANGE UP (ref 8.4–10.5)
CAST: 0 /LPF — SIGNIFICANT CHANGE UP (ref 0–4)
CAST: 15 /LPF — HIGH (ref 0–4)
CAST: 3 /LPF — SIGNIFICANT CHANGE UP (ref 0–4)
CAST: 4 /LPF — SIGNIFICANT CHANGE UP (ref 0–4)
CHLORIDE SERPL-SCNC: 100 MMOL/L — SIGNIFICANT CHANGE UP (ref 96–108)
CHLORIDE SERPL-SCNC: 101 MMOL/L — SIGNIFICANT CHANGE UP (ref 96–108)
CHLORIDE SERPL-SCNC: 102 MMOL/L — SIGNIFICANT CHANGE UP (ref 96–108)
CHLORIDE SERPL-SCNC: 103 MMOL/L — SIGNIFICANT CHANGE UP (ref 96–108)
CHLORIDE SERPL-SCNC: 104 MMOL/L — SIGNIFICANT CHANGE UP (ref 96–108)
CHLORIDE SERPL-SCNC: 105 MMOL/L — SIGNIFICANT CHANGE UP (ref 96–108)
CHLORIDE SERPL-SCNC: 106 MMOL/L — SIGNIFICANT CHANGE UP (ref 96–108)
CHLORIDE SERPL-SCNC: 107 MMOL/L — SIGNIFICANT CHANGE UP (ref 96–108)
CHLORIDE SERPL-SCNC: 108 MMOL/L — SIGNIFICANT CHANGE UP (ref 96–108)
CHLORIDE SERPL-SCNC: 108 MMOL/L — SIGNIFICANT CHANGE UP (ref 96–108)
CHLORIDE SERPL-SCNC: 109 MMOL/L — HIGH (ref 96–108)
CHLORIDE SERPL-SCNC: 109 MMOL/L — HIGH (ref 96–108)
CHLORIDE SERPL-SCNC: 111 MMOL/L — HIGH (ref 96–108)
CHLORIDE SERPL-SCNC: 112 MMOL/L — HIGH (ref 96–108)
CHLORIDE SERPL-SCNC: 114 MMOL/L — HIGH (ref 96–108)
CHLORIDE SERPL-SCNC: 92 MMOL/L — LOW (ref 96–108)
CHLORIDE SERPL-SCNC: 93 MMOL/L — LOW (ref 96–108)
CHLORIDE SERPL-SCNC: 93 MMOL/L — LOW (ref 96–108)
CHLORIDE SERPL-SCNC: 96 MMOL/L — SIGNIFICANT CHANGE UP (ref 96–108)
CHLORIDE SERPL-SCNC: 97 MMOL/L — SIGNIFICANT CHANGE UP (ref 96–108)
CHLORIDE SERPL-SCNC: 97 MMOL/L — SIGNIFICANT CHANGE UP (ref 96–108)
CHLORIDE SERPL-SCNC: 98 MMOL/L — SIGNIFICANT CHANGE UP (ref 96–108)
CHLORIDE SERPL-SCNC: 99 MMOL/L — SIGNIFICANT CHANGE UP (ref 96–108)
CO2 BLDV-SCNC: 18 MMOL/L — LOW (ref 22–26)
CO2 SERPL-SCNC: 11 MMOL/L — LOW (ref 22–31)
CO2 SERPL-SCNC: 11 MMOL/L — LOW (ref 22–31)
CO2 SERPL-SCNC: 13 MMOL/L — LOW (ref 22–31)
CO2 SERPL-SCNC: 14 MMOL/L — LOW (ref 22–31)
CO2 SERPL-SCNC: 14 MMOL/L — LOW (ref 22–31)
CO2 SERPL-SCNC: 15 MMOL/L — LOW (ref 22–31)
CO2 SERPL-SCNC: 16 MMOL/L — LOW (ref 22–31)
CO2 SERPL-SCNC: 17 MMOL/L — LOW (ref 22–31)
CO2 SERPL-SCNC: 18 MMOL/L — LOW (ref 22–31)
CO2 SERPL-SCNC: 19 MMOL/L — LOW (ref 22–31)
CO2 SERPL-SCNC: 20 MMOL/L — LOW (ref 22–31)
CO2 SERPL-SCNC: 21 MMOL/L — LOW (ref 22–31)
CO2 SERPL-SCNC: 22 MMOL/L — SIGNIFICANT CHANGE UP (ref 22–31)
CO2 SERPL-SCNC: 23 MMOL/L — SIGNIFICANT CHANGE UP (ref 22–31)
CO2 SERPL-SCNC: 24 MMOL/L — SIGNIFICANT CHANGE UP (ref 22–31)
CO2 SERPL-SCNC: 25 MMOL/L — SIGNIFICANT CHANGE UP (ref 22–31)
CO2 SERPL-SCNC: 26 MMOL/L — SIGNIFICANT CHANGE UP (ref 22–31)
CO2 SERPL-SCNC: 26 MMOL/L — SIGNIFICANT CHANGE UP (ref 22–31)
CO2 SERPL-SCNC: 28 MMOL/L — SIGNIFICANT CHANGE UP (ref 22–31)
COLOR SPEC: SIGNIFICANT CHANGE UP
COLOR SPEC: YELLOW — SIGNIFICANT CHANGE UP
CREAT SERPL-MCNC: 0.31 MG/DL — LOW (ref 0.5–1.3)
CREAT SERPL-MCNC: 0.59 MG/DL — SIGNIFICANT CHANGE UP (ref 0.5–1.3)
CREAT SERPL-MCNC: 0.6 MG/DL — SIGNIFICANT CHANGE UP (ref 0.5–1.3)
CREAT SERPL-MCNC: 0.6 MG/DL — SIGNIFICANT CHANGE UP (ref 0.5–1.3)
CREAT SERPL-MCNC: 0.64 MG/DL — SIGNIFICANT CHANGE UP (ref 0.5–1.3)
CREAT SERPL-MCNC: 0.65 MG/DL — SIGNIFICANT CHANGE UP (ref 0.5–1.3)
CREAT SERPL-MCNC: 0.71 MG/DL — SIGNIFICANT CHANGE UP (ref 0.5–1.3)
CREAT SERPL-MCNC: 0.74 MG/DL — SIGNIFICANT CHANGE UP (ref 0.5–1.3)
CREAT SERPL-MCNC: 0.84 MG/DL — SIGNIFICANT CHANGE UP (ref 0.5–1.3)
CREAT SERPL-MCNC: 0.87 MG/DL — SIGNIFICANT CHANGE UP (ref 0.5–1.3)
CREAT SERPL-MCNC: 0.87 MG/DL — SIGNIFICANT CHANGE UP (ref 0.5–1.3)
CREAT SERPL-MCNC: <0.3 MG/DL — LOW (ref 0.5–1.3)
CULTURE RESULTS: ABNORMAL
CULTURE RESULTS: NO GROWTH — SIGNIFICANT CHANGE UP
CULTURE RESULTS: NO GROWTH — SIGNIFICANT CHANGE UP
CULTURE RESULTS: SIGNIFICANT CHANGE UP
DACRYOCYTES BLD QL SMEAR: SLIGHT — SIGNIFICANT CHANGE UP
DAT C3-SP REAG RBC QL: NEGATIVE — SIGNIFICANT CHANGE UP
DAT C3-SP REAG RBC QL: NEGATIVE — SIGNIFICANT CHANGE UP
DIFF PNL FLD: ABNORMAL
DIFF PNL FLD: ABNORMAL
DIFF PNL FLD: NEGATIVE — SIGNIFICANT CHANGE UP
EGFR: 112 ML/MIN/1.73M2 — SIGNIFICANT CHANGE UP
EGFR: 113 ML/MIN/1.73M2 — SIGNIFICANT CHANGE UP
EGFR: 71 ML/MIN/1.73M2 — SIGNIFICANT CHANGE UP
EGFR: 71 ML/MIN/1.73M2 — SIGNIFICANT CHANGE UP
EGFR: 74 ML/MIN/1.73M2 — SIGNIFICANT CHANGE UP
EGFR: 86 ML/MIN/1.73M2 — SIGNIFICANT CHANGE UP
EGFR: 90 ML/MIN/1.73M2 — SIGNIFICANT CHANGE UP
EGFR: 93 ML/MIN/1.73M2 — SIGNIFICANT CHANGE UP
EGFR: 94 ML/MIN/1.73M2 — SIGNIFICANT CHANGE UP
EGFR: 95 ML/MIN/1.73M2 — SIGNIFICANT CHANGE UP
EGFR: 95 ML/MIN/1.73M2 — SIGNIFICANT CHANGE UP
EGFR: 96 ML/MIN/1.73M2 — SIGNIFICANT CHANGE UP
ELUATE ANTIBODY 1: SIGNIFICANT CHANGE UP
ELUATE ANTIBODY 1: SIGNIFICANT CHANGE UP
EOSINOPHIL # BLD AUTO: 0 K/UL — SIGNIFICANT CHANGE UP (ref 0–0.5)
EOSINOPHIL # BLD AUTO: 0.02 K/UL — SIGNIFICANT CHANGE UP (ref 0–0.5)
EOSINOPHIL # BLD AUTO: 0.02 K/UL — SIGNIFICANT CHANGE UP (ref 0–0.5)
EOSINOPHIL # BLD AUTO: 0.03 K/UL — SIGNIFICANT CHANGE UP (ref 0–0.5)
EOSINOPHIL # BLD AUTO: 0.03 K/UL — SIGNIFICANT CHANGE UP (ref 0–0.5)
EOSINOPHIL # BLD AUTO: 0.05 K/UL — SIGNIFICANT CHANGE UP (ref 0–0.5)
EOSINOPHIL # BLD AUTO: 0.07 K/UL — SIGNIFICANT CHANGE UP (ref 0–0.5)
EOSINOPHIL # BLD AUTO: 0.07 K/UL — SIGNIFICANT CHANGE UP (ref 0–0.5)
EOSINOPHIL # BLD AUTO: 0.13 K/UL — SIGNIFICANT CHANGE UP (ref 0–0.5)
EOSINOPHIL # BLD AUTO: 0.14 K/UL — SIGNIFICANT CHANGE UP (ref 0–0.5)
EOSINOPHIL NFR BLD AUTO: 0 % — SIGNIFICANT CHANGE UP (ref 0–6)
EOSINOPHIL NFR BLD AUTO: 0.1 % — SIGNIFICANT CHANGE UP (ref 0–6)
EOSINOPHIL NFR BLD AUTO: 0.3 % — SIGNIFICANT CHANGE UP (ref 0–6)
EOSINOPHIL NFR BLD AUTO: 0.4 % — SIGNIFICANT CHANGE UP (ref 0–6)
EOSINOPHIL NFR BLD AUTO: 0.5 % — SIGNIFICANT CHANGE UP (ref 0–6)
EOSINOPHIL NFR BLD AUTO: 0.8 % — SIGNIFICANT CHANGE UP (ref 0–6)
EOSINOPHIL NFR BLD AUTO: 0.9 % — SIGNIFICANT CHANGE UP (ref 0–6)
ESTIMATED AVERAGE GLUCOSE: 120 MG/DL — HIGH (ref 68–114)
FERRITIN SERPL-MCNC: 177 NG/ML — SIGNIFICANT CHANGE UP (ref 13–330)
FLUAV AG NPH QL: SIGNIFICANT CHANGE UP
FLUBV AG NPH QL: SIGNIFICANT CHANGE UP
FOLATE SERPL-MCNC: 12.1 NG/ML — SIGNIFICANT CHANGE UP
GAS PNL BLDA: SIGNIFICANT CHANGE UP
GAS PNL BLDV: SIGNIFICANT CHANGE UP
GI PCR PANEL: SIGNIFICANT CHANGE UP
GIANT PLATELETS BLD QL SMEAR: PRESENT — SIGNIFICANT CHANGE UP
GIANT PLATELETS BLD QL SMEAR: PRESENT — SIGNIFICANT CHANGE UP
GLUCOSE BLDC GLUCOMTR-MCNC: 102 MG/DL — HIGH (ref 70–99)
GLUCOSE BLDC GLUCOMTR-MCNC: 103 MG/DL — HIGH (ref 70–99)
GLUCOSE BLDC GLUCOMTR-MCNC: 104 MG/DL — HIGH (ref 70–99)
GLUCOSE BLDC GLUCOMTR-MCNC: 105 MG/DL — HIGH (ref 70–99)
GLUCOSE BLDC GLUCOMTR-MCNC: 105 MG/DL — HIGH (ref 70–99)
GLUCOSE BLDC GLUCOMTR-MCNC: 106 MG/DL — HIGH (ref 70–99)
GLUCOSE BLDC GLUCOMTR-MCNC: 108 MG/DL — HIGH (ref 70–99)
GLUCOSE BLDC GLUCOMTR-MCNC: 108 MG/DL — HIGH (ref 70–99)
GLUCOSE BLDC GLUCOMTR-MCNC: 110 MG/DL — HIGH (ref 70–99)
GLUCOSE BLDC GLUCOMTR-MCNC: 111 MG/DL — HIGH (ref 70–99)
GLUCOSE BLDC GLUCOMTR-MCNC: 111 MG/DL — HIGH (ref 70–99)
GLUCOSE BLDC GLUCOMTR-MCNC: 113 MG/DL — HIGH (ref 70–99)
GLUCOSE BLDC GLUCOMTR-MCNC: 115 MG/DL — HIGH (ref 70–99)
GLUCOSE BLDC GLUCOMTR-MCNC: 115 MG/DL — HIGH (ref 70–99)
GLUCOSE BLDC GLUCOMTR-MCNC: 117 MG/DL — HIGH (ref 70–99)
GLUCOSE BLDC GLUCOMTR-MCNC: 119 MG/DL — HIGH (ref 70–99)
GLUCOSE BLDC GLUCOMTR-MCNC: 120 MG/DL — HIGH (ref 70–99)
GLUCOSE BLDC GLUCOMTR-MCNC: 120 MG/DL — HIGH (ref 70–99)
GLUCOSE BLDC GLUCOMTR-MCNC: 121 MG/DL — HIGH (ref 70–99)
GLUCOSE BLDC GLUCOMTR-MCNC: 121 MG/DL — HIGH (ref 70–99)
GLUCOSE BLDC GLUCOMTR-MCNC: 125 MG/DL — HIGH (ref 70–99)
GLUCOSE BLDC GLUCOMTR-MCNC: 125 MG/DL — HIGH (ref 70–99)
GLUCOSE BLDC GLUCOMTR-MCNC: 127 MG/DL — HIGH (ref 70–99)
GLUCOSE BLDC GLUCOMTR-MCNC: 128 MG/DL — HIGH (ref 70–99)
GLUCOSE BLDC GLUCOMTR-MCNC: 129 MG/DL — HIGH (ref 70–99)
GLUCOSE BLDC GLUCOMTR-MCNC: 130 MG/DL — HIGH (ref 70–99)
GLUCOSE BLDC GLUCOMTR-MCNC: 130 MG/DL — HIGH (ref 70–99)
GLUCOSE BLDC GLUCOMTR-MCNC: 132 MG/DL — HIGH (ref 70–99)
GLUCOSE BLDC GLUCOMTR-MCNC: 133 MG/DL — HIGH (ref 70–99)
GLUCOSE BLDC GLUCOMTR-MCNC: 133 MG/DL — HIGH (ref 70–99)
GLUCOSE BLDC GLUCOMTR-MCNC: 135 MG/DL — HIGH (ref 70–99)
GLUCOSE BLDC GLUCOMTR-MCNC: 136 MG/DL — HIGH (ref 70–99)
GLUCOSE BLDC GLUCOMTR-MCNC: 137 MG/DL — HIGH (ref 70–99)
GLUCOSE BLDC GLUCOMTR-MCNC: 138 MG/DL — HIGH (ref 70–99)
GLUCOSE BLDC GLUCOMTR-MCNC: 140 MG/DL — HIGH (ref 70–99)
GLUCOSE BLDC GLUCOMTR-MCNC: 140 MG/DL — HIGH (ref 70–99)
GLUCOSE BLDC GLUCOMTR-MCNC: 141 MG/DL — HIGH (ref 70–99)
GLUCOSE BLDC GLUCOMTR-MCNC: 142 MG/DL — HIGH (ref 70–99)
GLUCOSE BLDC GLUCOMTR-MCNC: 143 MG/DL — HIGH (ref 70–99)
GLUCOSE BLDC GLUCOMTR-MCNC: 144 MG/DL — HIGH (ref 70–99)
GLUCOSE BLDC GLUCOMTR-MCNC: 144 MG/DL — HIGH (ref 70–99)
GLUCOSE BLDC GLUCOMTR-MCNC: 146 MG/DL — HIGH (ref 70–99)
GLUCOSE BLDC GLUCOMTR-MCNC: 147 MG/DL — HIGH (ref 70–99)
GLUCOSE BLDC GLUCOMTR-MCNC: 148 MG/DL — HIGH (ref 70–99)
GLUCOSE BLDC GLUCOMTR-MCNC: 149 MG/DL — HIGH (ref 70–99)
GLUCOSE BLDC GLUCOMTR-MCNC: 149 MG/DL — HIGH (ref 70–99)
GLUCOSE BLDC GLUCOMTR-MCNC: 150 MG/DL — HIGH (ref 70–99)
GLUCOSE BLDC GLUCOMTR-MCNC: 151 MG/DL — HIGH (ref 70–99)
GLUCOSE BLDC GLUCOMTR-MCNC: 151 MG/DL — HIGH (ref 70–99)
GLUCOSE BLDC GLUCOMTR-MCNC: 153 MG/DL — HIGH (ref 70–99)
GLUCOSE BLDC GLUCOMTR-MCNC: 154 MG/DL — HIGH (ref 70–99)
GLUCOSE BLDC GLUCOMTR-MCNC: 155 MG/DL — HIGH (ref 70–99)
GLUCOSE BLDC GLUCOMTR-MCNC: 155 MG/DL — HIGH (ref 70–99)
GLUCOSE BLDC GLUCOMTR-MCNC: 156 MG/DL — HIGH (ref 70–99)
GLUCOSE BLDC GLUCOMTR-MCNC: 156 MG/DL — HIGH (ref 70–99)
GLUCOSE BLDC GLUCOMTR-MCNC: 159 MG/DL — HIGH (ref 70–99)
GLUCOSE BLDC GLUCOMTR-MCNC: 159 MG/DL — HIGH (ref 70–99)
GLUCOSE BLDC GLUCOMTR-MCNC: 160 MG/DL — HIGH (ref 70–99)
GLUCOSE BLDC GLUCOMTR-MCNC: 161 MG/DL — HIGH (ref 70–99)
GLUCOSE BLDC GLUCOMTR-MCNC: 162 MG/DL — HIGH (ref 70–99)
GLUCOSE BLDC GLUCOMTR-MCNC: 163 MG/DL — HIGH (ref 70–99)
GLUCOSE BLDC GLUCOMTR-MCNC: 163 MG/DL — HIGH (ref 70–99)
GLUCOSE BLDC GLUCOMTR-MCNC: 164 MG/DL — HIGH (ref 70–99)
GLUCOSE BLDC GLUCOMTR-MCNC: 164 MG/DL — HIGH (ref 70–99)
GLUCOSE BLDC GLUCOMTR-MCNC: 165 MG/DL — HIGH (ref 70–99)
GLUCOSE BLDC GLUCOMTR-MCNC: 165 MG/DL — HIGH (ref 70–99)
GLUCOSE BLDC GLUCOMTR-MCNC: 166 MG/DL — HIGH (ref 70–99)
GLUCOSE BLDC GLUCOMTR-MCNC: 166 MG/DL — HIGH (ref 70–99)
GLUCOSE BLDC GLUCOMTR-MCNC: 168 MG/DL — HIGH (ref 70–99)
GLUCOSE BLDC GLUCOMTR-MCNC: 168 MG/DL — HIGH (ref 70–99)
GLUCOSE BLDC GLUCOMTR-MCNC: 170 MG/DL — HIGH (ref 70–99)
GLUCOSE BLDC GLUCOMTR-MCNC: 171 MG/DL — HIGH (ref 70–99)
GLUCOSE BLDC GLUCOMTR-MCNC: 172 MG/DL — HIGH (ref 70–99)
GLUCOSE BLDC GLUCOMTR-MCNC: 173 MG/DL — HIGH (ref 70–99)
GLUCOSE BLDC GLUCOMTR-MCNC: 174 MG/DL — HIGH (ref 70–99)
GLUCOSE BLDC GLUCOMTR-MCNC: 174 MG/DL — HIGH (ref 70–99)
GLUCOSE BLDC GLUCOMTR-MCNC: 175 MG/DL — HIGH (ref 70–99)
GLUCOSE BLDC GLUCOMTR-MCNC: 176 MG/DL — HIGH (ref 70–99)
GLUCOSE BLDC GLUCOMTR-MCNC: 177 MG/DL — HIGH (ref 70–99)
GLUCOSE BLDC GLUCOMTR-MCNC: 178 MG/DL — HIGH (ref 70–99)
GLUCOSE BLDC GLUCOMTR-MCNC: 178 MG/DL — HIGH (ref 70–99)
GLUCOSE BLDC GLUCOMTR-MCNC: 179 MG/DL — HIGH (ref 70–99)
GLUCOSE BLDC GLUCOMTR-MCNC: 179 MG/DL — HIGH (ref 70–99)
GLUCOSE BLDC GLUCOMTR-MCNC: 182 MG/DL — HIGH (ref 70–99)
GLUCOSE BLDC GLUCOMTR-MCNC: 182 MG/DL — HIGH (ref 70–99)
GLUCOSE BLDC GLUCOMTR-MCNC: 183 MG/DL — HIGH (ref 70–99)
GLUCOSE BLDC GLUCOMTR-MCNC: 183 MG/DL — HIGH (ref 70–99)
GLUCOSE BLDC GLUCOMTR-MCNC: 184 MG/DL — HIGH (ref 70–99)
GLUCOSE BLDC GLUCOMTR-MCNC: 185 MG/DL — HIGH (ref 70–99)
GLUCOSE BLDC GLUCOMTR-MCNC: 185 MG/DL — HIGH (ref 70–99)
GLUCOSE BLDC GLUCOMTR-MCNC: 186 MG/DL — HIGH (ref 70–99)
GLUCOSE BLDC GLUCOMTR-MCNC: 187 MG/DL — HIGH (ref 70–99)
GLUCOSE BLDC GLUCOMTR-MCNC: 188 MG/DL — HIGH (ref 70–99)
GLUCOSE BLDC GLUCOMTR-MCNC: 189 MG/DL — HIGH (ref 70–99)
GLUCOSE BLDC GLUCOMTR-MCNC: 190 MG/DL — HIGH (ref 70–99)
GLUCOSE BLDC GLUCOMTR-MCNC: 191 MG/DL — HIGH (ref 70–99)
GLUCOSE BLDC GLUCOMTR-MCNC: 192 MG/DL — HIGH (ref 70–99)
GLUCOSE BLDC GLUCOMTR-MCNC: 193 MG/DL — HIGH (ref 70–99)
GLUCOSE BLDC GLUCOMTR-MCNC: 194 MG/DL — HIGH (ref 70–99)
GLUCOSE BLDC GLUCOMTR-MCNC: 194 MG/DL — HIGH (ref 70–99)
GLUCOSE BLDC GLUCOMTR-MCNC: 196 MG/DL — HIGH (ref 70–99)
GLUCOSE BLDC GLUCOMTR-MCNC: 197 MG/DL — HIGH (ref 70–99)
GLUCOSE BLDC GLUCOMTR-MCNC: 197 MG/DL — HIGH (ref 70–99)
GLUCOSE BLDC GLUCOMTR-MCNC: 198 MG/DL — HIGH (ref 70–99)
GLUCOSE BLDC GLUCOMTR-MCNC: 199 MG/DL — HIGH (ref 70–99)
GLUCOSE BLDC GLUCOMTR-MCNC: 199 MG/DL — HIGH (ref 70–99)
GLUCOSE BLDC GLUCOMTR-MCNC: 200 MG/DL — HIGH (ref 70–99)
GLUCOSE BLDC GLUCOMTR-MCNC: 201 MG/DL — HIGH (ref 70–99)
GLUCOSE BLDC GLUCOMTR-MCNC: 202 MG/DL — HIGH (ref 70–99)
GLUCOSE BLDC GLUCOMTR-MCNC: 203 MG/DL — HIGH (ref 70–99)
GLUCOSE BLDC GLUCOMTR-MCNC: 203 MG/DL — HIGH (ref 70–99)
GLUCOSE BLDC GLUCOMTR-MCNC: 204 MG/DL — HIGH (ref 70–99)
GLUCOSE BLDC GLUCOMTR-MCNC: 205 MG/DL — HIGH (ref 70–99)
GLUCOSE BLDC GLUCOMTR-MCNC: 205 MG/DL — HIGH (ref 70–99)
GLUCOSE BLDC GLUCOMTR-MCNC: 206 MG/DL — HIGH (ref 70–99)
GLUCOSE BLDC GLUCOMTR-MCNC: 206 MG/DL — HIGH (ref 70–99)
GLUCOSE BLDC GLUCOMTR-MCNC: 207 MG/DL — HIGH (ref 70–99)
GLUCOSE BLDC GLUCOMTR-MCNC: 208 MG/DL — HIGH (ref 70–99)
GLUCOSE BLDC GLUCOMTR-MCNC: 208 MG/DL — HIGH (ref 70–99)
GLUCOSE BLDC GLUCOMTR-MCNC: 210 MG/DL — HIGH (ref 70–99)
GLUCOSE BLDC GLUCOMTR-MCNC: 210 MG/DL — HIGH (ref 70–99)
GLUCOSE BLDC GLUCOMTR-MCNC: 211 MG/DL — HIGH (ref 70–99)
GLUCOSE BLDC GLUCOMTR-MCNC: 211 MG/DL — HIGH (ref 70–99)
GLUCOSE BLDC GLUCOMTR-MCNC: 214 MG/DL — HIGH (ref 70–99)
GLUCOSE BLDC GLUCOMTR-MCNC: 214 MG/DL — HIGH (ref 70–99)
GLUCOSE BLDC GLUCOMTR-MCNC: 215 MG/DL — HIGH (ref 70–99)
GLUCOSE BLDC GLUCOMTR-MCNC: 215 MG/DL — HIGH (ref 70–99)
GLUCOSE BLDC GLUCOMTR-MCNC: 216 MG/DL — HIGH (ref 70–99)
GLUCOSE BLDC GLUCOMTR-MCNC: 217 MG/DL — HIGH (ref 70–99)
GLUCOSE BLDC GLUCOMTR-MCNC: 218 MG/DL — HIGH (ref 70–99)
GLUCOSE BLDC GLUCOMTR-MCNC: 219 MG/DL — HIGH (ref 70–99)
GLUCOSE BLDC GLUCOMTR-MCNC: 220 MG/DL — HIGH (ref 70–99)
GLUCOSE BLDC GLUCOMTR-MCNC: 221 MG/DL — HIGH (ref 70–99)
GLUCOSE BLDC GLUCOMTR-MCNC: 223 MG/DL — HIGH (ref 70–99)
GLUCOSE BLDC GLUCOMTR-MCNC: 223 MG/DL — HIGH (ref 70–99)
GLUCOSE BLDC GLUCOMTR-MCNC: 225 MG/DL — HIGH (ref 70–99)
GLUCOSE BLDC GLUCOMTR-MCNC: 225 MG/DL — HIGH (ref 70–99)
GLUCOSE BLDC GLUCOMTR-MCNC: 226 MG/DL — HIGH (ref 70–99)
GLUCOSE BLDC GLUCOMTR-MCNC: 226 MG/DL — HIGH (ref 70–99)
GLUCOSE BLDC GLUCOMTR-MCNC: 227 MG/DL — HIGH (ref 70–99)
GLUCOSE BLDC GLUCOMTR-MCNC: 227 MG/DL — HIGH (ref 70–99)
GLUCOSE BLDC GLUCOMTR-MCNC: 231 MG/DL — HIGH (ref 70–99)
GLUCOSE BLDC GLUCOMTR-MCNC: 232 MG/DL — HIGH (ref 70–99)
GLUCOSE BLDC GLUCOMTR-MCNC: 232 MG/DL — HIGH (ref 70–99)
GLUCOSE BLDC GLUCOMTR-MCNC: 233 MG/DL — HIGH (ref 70–99)
GLUCOSE BLDC GLUCOMTR-MCNC: 235 MG/DL — HIGH (ref 70–99)
GLUCOSE BLDC GLUCOMTR-MCNC: 236 MG/DL — HIGH (ref 70–99)
GLUCOSE BLDC GLUCOMTR-MCNC: 238 MG/DL — HIGH (ref 70–99)
GLUCOSE BLDC GLUCOMTR-MCNC: 240 MG/DL — HIGH (ref 70–99)
GLUCOSE BLDC GLUCOMTR-MCNC: 243 MG/DL — HIGH (ref 70–99)
GLUCOSE BLDC GLUCOMTR-MCNC: 247 MG/DL — HIGH (ref 70–99)
GLUCOSE BLDC GLUCOMTR-MCNC: 247 MG/DL — HIGH (ref 70–99)
GLUCOSE BLDC GLUCOMTR-MCNC: 248 MG/DL — HIGH (ref 70–99)
GLUCOSE BLDC GLUCOMTR-MCNC: 249 MG/DL — HIGH (ref 70–99)
GLUCOSE BLDC GLUCOMTR-MCNC: 250 MG/DL — HIGH (ref 70–99)
GLUCOSE BLDC GLUCOMTR-MCNC: 252 MG/DL — HIGH (ref 70–99)
GLUCOSE BLDC GLUCOMTR-MCNC: 253 MG/DL — HIGH (ref 70–99)
GLUCOSE BLDC GLUCOMTR-MCNC: 254 MG/DL — HIGH (ref 70–99)
GLUCOSE BLDC GLUCOMTR-MCNC: 266 MG/DL — HIGH (ref 70–99)
GLUCOSE BLDC GLUCOMTR-MCNC: 268 MG/DL — HIGH (ref 70–99)
GLUCOSE BLDC GLUCOMTR-MCNC: 269 MG/DL — HIGH (ref 70–99)
GLUCOSE BLDC GLUCOMTR-MCNC: 271 MG/DL — HIGH (ref 70–99)
GLUCOSE BLDC GLUCOMTR-MCNC: 273 MG/DL — HIGH (ref 70–99)
GLUCOSE BLDC GLUCOMTR-MCNC: 274 MG/DL — HIGH (ref 70–99)
GLUCOSE BLDC GLUCOMTR-MCNC: 276 MG/DL — HIGH (ref 70–99)
GLUCOSE BLDC GLUCOMTR-MCNC: 280 MG/DL — HIGH (ref 70–99)
GLUCOSE BLDC GLUCOMTR-MCNC: 280 MG/DL — HIGH (ref 70–99)
GLUCOSE BLDC GLUCOMTR-MCNC: 281 MG/DL — HIGH (ref 70–99)
GLUCOSE BLDC GLUCOMTR-MCNC: 281 MG/DL — HIGH (ref 70–99)
GLUCOSE BLDC GLUCOMTR-MCNC: 283 MG/DL — HIGH (ref 70–99)
GLUCOSE BLDC GLUCOMTR-MCNC: 285 MG/DL — HIGH (ref 70–99)
GLUCOSE BLDC GLUCOMTR-MCNC: 286 MG/DL — HIGH (ref 70–99)
GLUCOSE BLDC GLUCOMTR-MCNC: 289 MG/DL — HIGH (ref 70–99)
GLUCOSE BLDC GLUCOMTR-MCNC: 290 MG/DL — HIGH (ref 70–99)
GLUCOSE BLDC GLUCOMTR-MCNC: 291 MG/DL — HIGH (ref 70–99)
GLUCOSE BLDC GLUCOMTR-MCNC: 292 MG/DL — HIGH (ref 70–99)
GLUCOSE BLDC GLUCOMTR-MCNC: 293 MG/DL — HIGH (ref 70–99)
GLUCOSE BLDC GLUCOMTR-MCNC: 296 MG/DL — HIGH (ref 70–99)
GLUCOSE BLDC GLUCOMTR-MCNC: 297 MG/DL — HIGH (ref 70–99)
GLUCOSE BLDC GLUCOMTR-MCNC: 301 MG/DL — HIGH (ref 70–99)
GLUCOSE BLDC GLUCOMTR-MCNC: 305 MG/DL — HIGH (ref 70–99)
GLUCOSE BLDC GLUCOMTR-MCNC: 305 MG/DL — HIGH (ref 70–99)
GLUCOSE BLDC GLUCOMTR-MCNC: 307 MG/DL — HIGH (ref 70–99)
GLUCOSE BLDC GLUCOMTR-MCNC: 309 MG/DL — HIGH (ref 70–99)
GLUCOSE BLDC GLUCOMTR-MCNC: 309 MG/DL — HIGH (ref 70–99)
GLUCOSE BLDC GLUCOMTR-MCNC: 311 MG/DL — HIGH (ref 70–99)
GLUCOSE BLDC GLUCOMTR-MCNC: 317 MG/DL — HIGH (ref 70–99)
GLUCOSE BLDC GLUCOMTR-MCNC: 319 MG/DL — HIGH (ref 70–99)
GLUCOSE BLDC GLUCOMTR-MCNC: 321 MG/DL — HIGH (ref 70–99)
GLUCOSE BLDC GLUCOMTR-MCNC: 323 MG/DL — HIGH (ref 70–99)
GLUCOSE BLDC GLUCOMTR-MCNC: 325 MG/DL — HIGH (ref 70–99)
GLUCOSE BLDC GLUCOMTR-MCNC: 325 MG/DL — HIGH (ref 70–99)
GLUCOSE BLDC GLUCOMTR-MCNC: 326 MG/DL — HIGH (ref 70–99)
GLUCOSE BLDC GLUCOMTR-MCNC: 327 MG/DL — HIGH (ref 70–99)
GLUCOSE BLDC GLUCOMTR-MCNC: 327 MG/DL — HIGH (ref 70–99)
GLUCOSE BLDC GLUCOMTR-MCNC: 330 MG/DL — HIGH (ref 70–99)
GLUCOSE BLDC GLUCOMTR-MCNC: 330 MG/DL — HIGH (ref 70–99)
GLUCOSE BLDC GLUCOMTR-MCNC: 352 MG/DL — HIGH (ref 70–99)
GLUCOSE BLDC GLUCOMTR-MCNC: 353 MG/DL — HIGH (ref 70–99)
GLUCOSE BLDC GLUCOMTR-MCNC: 355 MG/DL — HIGH (ref 70–99)
GLUCOSE BLDC GLUCOMTR-MCNC: 360 MG/DL — HIGH (ref 70–99)
GLUCOSE BLDC GLUCOMTR-MCNC: 366 MG/DL — HIGH (ref 70–99)
GLUCOSE BLDC GLUCOMTR-MCNC: 371 MG/DL — HIGH (ref 70–99)
GLUCOSE BLDC GLUCOMTR-MCNC: 373 MG/DL — HIGH (ref 70–99)
GLUCOSE BLDC GLUCOMTR-MCNC: 377 MG/DL — HIGH (ref 70–99)
GLUCOSE BLDC GLUCOMTR-MCNC: 382 MG/DL — HIGH (ref 70–99)
GLUCOSE BLDC GLUCOMTR-MCNC: 386 MG/DL — HIGH (ref 70–99)
GLUCOSE BLDC GLUCOMTR-MCNC: 391 MG/DL — HIGH (ref 70–99)
GLUCOSE BLDC GLUCOMTR-MCNC: 392 MG/DL — HIGH (ref 70–99)
GLUCOSE BLDC GLUCOMTR-MCNC: 413 MG/DL — HIGH (ref 70–99)
GLUCOSE BLDC GLUCOMTR-MCNC: 414 MG/DL — HIGH (ref 70–99)
GLUCOSE BLDC GLUCOMTR-MCNC: 419 MG/DL — HIGH (ref 70–99)
GLUCOSE BLDC GLUCOMTR-MCNC: 420 MG/DL — HIGH (ref 70–99)
GLUCOSE BLDC GLUCOMTR-MCNC: 431 MG/DL — HIGH (ref 70–99)
GLUCOSE BLDC GLUCOMTR-MCNC: 434 MG/DL — HIGH (ref 70–99)
GLUCOSE BLDC GLUCOMTR-MCNC: 447 MG/DL — HIGH (ref 70–99)
GLUCOSE BLDC GLUCOMTR-MCNC: 453 MG/DL — CRITICAL HIGH (ref 70–99)
GLUCOSE BLDC GLUCOMTR-MCNC: 59 MG/DL — LOW (ref 70–99)
GLUCOSE BLDC GLUCOMTR-MCNC: 60 MG/DL — LOW (ref 70–99)
GLUCOSE BLDC GLUCOMTR-MCNC: 61 MG/DL — LOW (ref 70–99)
GLUCOSE BLDC GLUCOMTR-MCNC: 62 MG/DL — LOW (ref 70–99)
GLUCOSE BLDC GLUCOMTR-MCNC: 63 MG/DL — LOW (ref 70–99)
GLUCOSE BLDC GLUCOMTR-MCNC: 66 MG/DL — LOW (ref 70–99)
GLUCOSE BLDC GLUCOMTR-MCNC: 69 MG/DL — LOW (ref 70–99)
GLUCOSE BLDC GLUCOMTR-MCNC: 77 MG/DL — SIGNIFICANT CHANGE UP (ref 70–99)
GLUCOSE BLDC GLUCOMTR-MCNC: 77 MG/DL — SIGNIFICANT CHANGE UP (ref 70–99)
GLUCOSE BLDC GLUCOMTR-MCNC: 79 MG/DL — SIGNIFICANT CHANGE UP (ref 70–99)
GLUCOSE BLDC GLUCOMTR-MCNC: 87 MG/DL — SIGNIFICANT CHANGE UP (ref 70–99)
GLUCOSE BLDC GLUCOMTR-MCNC: 89 MG/DL — SIGNIFICANT CHANGE UP (ref 70–99)
GLUCOSE BLDC GLUCOMTR-MCNC: 89 MG/DL — SIGNIFICANT CHANGE UP (ref 70–99)
GLUCOSE BLDC GLUCOMTR-MCNC: 91 MG/DL — SIGNIFICANT CHANGE UP (ref 70–99)
GLUCOSE BLDC GLUCOMTR-MCNC: 92 MG/DL — SIGNIFICANT CHANGE UP (ref 70–99)
GLUCOSE BLDC GLUCOMTR-MCNC: 93 MG/DL — SIGNIFICANT CHANGE UP (ref 70–99)
GLUCOSE BLDC GLUCOMTR-MCNC: 96 MG/DL — SIGNIFICANT CHANGE UP (ref 70–99)
GLUCOSE SERPL-MCNC: 101 MG/DL — HIGH (ref 70–99)
GLUCOSE SERPL-MCNC: 106 MG/DL — HIGH (ref 70–99)
GLUCOSE SERPL-MCNC: 106 MG/DL — HIGH (ref 70–99)
GLUCOSE SERPL-MCNC: 109 MG/DL — HIGH (ref 70–99)
GLUCOSE SERPL-MCNC: 110 MG/DL — HIGH (ref 70–99)
GLUCOSE SERPL-MCNC: 113 MG/DL — HIGH (ref 70–99)
GLUCOSE SERPL-MCNC: 113 MG/DL — HIGH (ref 70–99)
GLUCOSE SERPL-MCNC: 114 MG/DL — HIGH (ref 70–99)
GLUCOSE SERPL-MCNC: 114 MG/DL — HIGH (ref 70–99)
GLUCOSE SERPL-MCNC: 118 MG/DL — HIGH (ref 70–99)
GLUCOSE SERPL-MCNC: 118 MG/DL — HIGH (ref 70–99)
GLUCOSE SERPL-MCNC: 123 MG/DL — HIGH (ref 70–99)
GLUCOSE SERPL-MCNC: 125 MG/DL — HIGH (ref 70–99)
GLUCOSE SERPL-MCNC: 130 MG/DL — HIGH (ref 70–99)
GLUCOSE SERPL-MCNC: 130 MG/DL — HIGH (ref 70–99)
GLUCOSE SERPL-MCNC: 131 MG/DL — HIGH (ref 70–99)
GLUCOSE SERPL-MCNC: 133 MG/DL — HIGH (ref 70–99)
GLUCOSE SERPL-MCNC: 133 MG/DL — HIGH (ref 70–99)
GLUCOSE SERPL-MCNC: 137 MG/DL — HIGH (ref 70–99)
GLUCOSE SERPL-MCNC: 140 MG/DL — HIGH (ref 70–99)
GLUCOSE SERPL-MCNC: 143 MG/DL — HIGH (ref 70–99)
GLUCOSE SERPL-MCNC: 145 MG/DL — HIGH (ref 70–99)
GLUCOSE SERPL-MCNC: 148 MG/DL — HIGH (ref 70–99)
GLUCOSE SERPL-MCNC: 150 MG/DL — HIGH (ref 70–99)
GLUCOSE SERPL-MCNC: 155 MG/DL — HIGH (ref 70–99)
GLUCOSE SERPL-MCNC: 157 MG/DL — HIGH (ref 70–99)
GLUCOSE SERPL-MCNC: 159 MG/DL — HIGH (ref 70–99)
GLUCOSE SERPL-MCNC: 160 MG/DL — HIGH (ref 70–99)
GLUCOSE SERPL-MCNC: 161 MG/DL — HIGH (ref 70–99)
GLUCOSE SERPL-MCNC: 162 MG/DL — HIGH (ref 70–99)
GLUCOSE SERPL-MCNC: 164 MG/DL — HIGH (ref 70–99)
GLUCOSE SERPL-MCNC: 166 MG/DL — HIGH (ref 70–99)
GLUCOSE SERPL-MCNC: 190 MG/DL — HIGH (ref 70–99)
GLUCOSE SERPL-MCNC: 193 MG/DL — HIGH (ref 70–99)
GLUCOSE SERPL-MCNC: 193 MG/DL — HIGH (ref 70–99)
GLUCOSE SERPL-MCNC: 196 MG/DL — HIGH (ref 70–99)
GLUCOSE SERPL-MCNC: 204 MG/DL — HIGH (ref 70–99)
GLUCOSE SERPL-MCNC: 205 MG/DL — HIGH (ref 70–99)
GLUCOSE SERPL-MCNC: 215 MG/DL — HIGH (ref 70–99)
GLUCOSE SERPL-MCNC: 223 MG/DL — HIGH (ref 70–99)
GLUCOSE SERPL-MCNC: 230 MG/DL — HIGH (ref 70–99)
GLUCOSE SERPL-MCNC: 246 MG/DL — HIGH (ref 70–99)
GLUCOSE SERPL-MCNC: 251 MG/DL — HIGH (ref 70–99)
GLUCOSE SERPL-MCNC: 277 MG/DL — HIGH (ref 70–99)
GLUCOSE SERPL-MCNC: 302 MG/DL — HIGH (ref 70–99)
GLUCOSE SERPL-MCNC: 324 MG/DL — HIGH (ref 70–99)
GLUCOSE SERPL-MCNC: 326 MG/DL — HIGH (ref 70–99)
GLUCOSE SERPL-MCNC: 402 MG/DL — HIGH (ref 70–99)
GLUCOSE SERPL-MCNC: 45 MG/DL — CRITICAL LOW (ref 70–99)
GLUCOSE SERPL-MCNC: 73 MG/DL — SIGNIFICANT CHANGE UP (ref 70–99)
GLUCOSE SERPL-MCNC: 93 MG/DL — SIGNIFICANT CHANGE UP (ref 70–99)
GLUCOSE SERPL-MCNC: 98 MG/DL — SIGNIFICANT CHANGE UP (ref 70–99)
GLUCOSE UR QL: NEGATIVE MG/DL — SIGNIFICANT CHANGE UP
GRAM STN FLD: ABNORMAL
HAPTOGLOB SERPL-MCNC: 224 MG/DL — HIGH (ref 34–200)
HCO3 BLDV-SCNC: 17 MMOL/L — LOW (ref 22–29)
HCT VFR BLD CALC: 23.2 % — LOW (ref 34.5–45)
HCT VFR BLD CALC: 23.9 % — LOW (ref 34.5–45)
HCT VFR BLD CALC: 24.1 % — LOW (ref 34.5–45)
HCT VFR BLD CALC: 24.2 % — LOW (ref 34.5–45)
HCT VFR BLD CALC: 24.2 % — LOW (ref 34.5–45)
HCT VFR BLD CALC: 24.3 % — LOW (ref 34.5–45)
HCT VFR BLD CALC: 24.5 % — LOW (ref 34.5–45)
HCT VFR BLD CALC: 24.6 % — LOW (ref 34.5–45)
HCT VFR BLD CALC: 24.6 % — LOW (ref 34.5–45)
HCT VFR BLD CALC: 24.9 % — LOW (ref 34.5–45)
HCT VFR BLD CALC: 25.2 % — LOW (ref 34.5–45)
HCT VFR BLD CALC: 25.5 % — LOW (ref 34.5–45)
HCT VFR BLD CALC: 25.6 % — LOW (ref 34.5–45)
HCT VFR BLD CALC: 25.8 % — LOW (ref 34.5–45)
HCT VFR BLD CALC: 26.2 % — LOW (ref 34.5–45)
HCT VFR BLD CALC: 26.3 % — LOW (ref 34.5–45)
HCT VFR BLD CALC: 26.4 % — LOW (ref 34.5–45)
HCT VFR BLD CALC: 26.6 % — LOW (ref 34.5–45)
HCT VFR BLD CALC: 27 % — LOW (ref 34.5–45)
HCT VFR BLD CALC: 27.1 % — LOW (ref 34.5–45)
HCT VFR BLD CALC: 27.2 % — LOW (ref 34.5–45)
HCT VFR BLD CALC: 27.2 % — LOW (ref 34.5–45)
HCT VFR BLD CALC: 27.3 % — LOW (ref 34.5–45)
HCT VFR BLD CALC: 27.5 % — LOW (ref 34.5–45)
HCT VFR BLD CALC: 27.8 % — LOW (ref 34.5–45)
HCT VFR BLD CALC: 27.9 % — LOW (ref 34.5–45)
HCT VFR BLD CALC: 28.1 % — LOW (ref 34.5–45)
HCT VFR BLD CALC: 28.2 % — LOW (ref 34.5–45)
HCT VFR BLD CALC: 28.2 % — LOW (ref 34.5–45)
HCT VFR BLD CALC: 28.4 % — LOW (ref 34.5–45)
HCT VFR BLD CALC: 28.9 % — LOW (ref 34.5–45)
HCT VFR BLD CALC: 29.2 % — LOW (ref 34.5–45)
HCT VFR BLD CALC: 29.2 % — LOW (ref 34.5–45)
HCT VFR BLD CALC: 29.3 % — LOW (ref 34.5–45)
HCT VFR BLD CALC: 29.5 % — LOW (ref 34.5–45)
HCT VFR BLD CALC: 29.7 % — LOW (ref 34.5–45)
HCT VFR BLD CALC: 29.7 % — LOW (ref 34.5–45)
HCT VFR BLD CALC: 29.8 % — LOW (ref 34.5–45)
HCT VFR BLD CALC: 29.9 % — LOW (ref 34.5–45)
HCT VFR BLD CALC: 29.9 % — LOW (ref 34.5–45)
HCT VFR BLD CALC: 30.2 % — LOW (ref 34.5–45)
HCT VFR BLD CALC: 30.7 % — LOW (ref 34.5–45)
HCT VFR BLD CALC: 31.1 % — LOW (ref 34.5–45)
HCT VFR BLD CALC: 32.6 % — LOW (ref 34.5–45)
HCT VFR BLD CALC: 33.1 % — LOW (ref 34.5–45)
HCT VFR BLD CALC: 34.3 % — LOW (ref 34.5–45)
HGB BLD-MCNC: 6.6 G/DL — CRITICAL LOW (ref 11.5–15.5)
HGB BLD-MCNC: 6.8 G/DL — CRITICAL LOW (ref 11.5–15.5)
HGB BLD-MCNC: 6.9 G/DL — CRITICAL LOW (ref 11.5–15.5)
HGB BLD-MCNC: 6.9 G/DL — CRITICAL LOW (ref 11.5–15.5)
HGB BLD-MCNC: 7 G/DL — CRITICAL LOW (ref 11.5–15.5)
HGB BLD-MCNC: 7 G/DL — CRITICAL LOW (ref 11.5–15.5)
HGB BLD-MCNC: 7.1 G/DL — LOW (ref 11.5–15.5)
HGB BLD-MCNC: 7.2 G/DL — LOW (ref 11.5–15.5)
HGB BLD-MCNC: 7.2 G/DL — LOW (ref 11.5–15.5)
HGB BLD-MCNC: 7.3 G/DL — LOW (ref 11.5–15.5)
HGB BLD-MCNC: 7.4 G/DL — LOW (ref 11.5–15.5)
HGB BLD-MCNC: 7.5 G/DL — LOW (ref 11.5–15.5)
HGB BLD-MCNC: 7.6 G/DL — LOW (ref 11.5–15.5)
HGB BLD-MCNC: 7.7 G/DL — LOW (ref 11.5–15.5)
HGB BLD-MCNC: 7.7 G/DL — LOW (ref 11.5–15.5)
HGB BLD-MCNC: 7.8 G/DL — LOW (ref 11.5–15.5)
HGB BLD-MCNC: 7.8 G/DL — LOW (ref 11.5–15.5)
HGB BLD-MCNC: 7.9 G/DL — LOW (ref 11.5–15.5)
HGB BLD-MCNC: 8 G/DL — LOW (ref 11.5–15.5)
HGB BLD-MCNC: 8.1 G/DL — LOW (ref 11.5–15.5)
HGB BLD-MCNC: 8.2 G/DL — LOW (ref 11.5–15.5)
HGB BLD-MCNC: 8.3 G/DL — LOW (ref 11.5–15.5)
HGB BLD-MCNC: 8.4 G/DL — LOW (ref 11.5–15.5)
HGB BLD-MCNC: 8.5 G/DL — LOW (ref 11.5–15.5)
HGB BLD-MCNC: 8.6 G/DL — LOW (ref 11.5–15.5)
HGB BLD-MCNC: 8.7 G/DL — LOW (ref 11.5–15.5)
HGB BLD-MCNC: 8.8 G/DL — LOW (ref 11.5–15.5)
HGB BLD-MCNC: 8.9 G/DL — LOW (ref 11.5–15.5)
HGB BLD-MCNC: 9 G/DL — LOW (ref 11.5–15.5)
HGB BLD-MCNC: 9.2 G/DL — LOW (ref 11.5–15.5)
HGB BLD-MCNC: 9.4 G/DL — LOW (ref 11.5–15.5)
HGB BLD-MCNC: 9.5 G/DL — LOW (ref 11.5–15.5)
HGB BLD-MCNC: 9.5 G/DL — LOW (ref 11.5–15.5)
IMM GRANULOCYTES NFR BLD AUTO: 0.9 % — SIGNIFICANT CHANGE UP (ref 0–0.9)
IMM GRANULOCYTES NFR BLD AUTO: 1.1 % — HIGH (ref 0–0.9)
IMM GRANULOCYTES NFR BLD AUTO: 1.5 % — HIGH (ref 0–0.9)
IMM GRANULOCYTES NFR BLD AUTO: 1.8 % — HIGH (ref 0–0.9)
IMM GRANULOCYTES NFR BLD AUTO: 2 % — HIGH (ref 0–0.9)
IMM GRANULOCYTES NFR BLD AUTO: 2.6 % — HIGH (ref 0–0.9)
IMM GRANULOCYTES NFR BLD AUTO: 5.8 % — HIGH (ref 0–0.9)
INR BLD: 1.15 RATIO — SIGNIFICANT CHANGE UP (ref 0.85–1.16)
INR BLD: 1.19 RATIO — HIGH (ref 0.85–1.16)
INR BLD: 1.27 RATIO — HIGH (ref 0.85–1.16)
IRON SATN MFR SERPL: 13 UG/DL — LOW (ref 30–160)
IRON SATN MFR SERPL: 15 UG/DL — LOW (ref 30–160)
IRON SATN MFR SERPL: 26 UG/DL — LOW (ref 30–160)
IRON SATN MFR SERPL: 9 % — LOW (ref 14–50)
KETONES UR-MCNC: ABNORMAL MG/DL
KETONES UR-MCNC: ABNORMAL MG/DL
KETONES UR-MCNC: NEGATIVE MG/DL — SIGNIFICANT CHANGE UP
LACTATE SERPL-SCNC: 2.2 MMOL/L — HIGH (ref 0.5–2)
LACTATE SERPL-SCNC: 2.9 MMOL/L — HIGH (ref 0.5–2)
LACTATE SERPL-SCNC: 3.9 MMOL/L — HIGH (ref 0.5–2)
LACTATE SERPL-SCNC: 5.8 MMOL/L — CRITICAL HIGH (ref 0.5–2)
LACTATE SERPL-SCNC: 7 MMOL/L — CRITICAL HIGH (ref 0.5–2)
LDH SERPL L TO P-CCNC: 394 U/L — HIGH (ref 50–242)
LEGIONELLA AG UR QL: NEGATIVE — SIGNIFICANT CHANGE UP
LEUKOCYTE ESTERASE UR-ACNC: ABNORMAL
LEUKOCYTE ESTERASE UR-ACNC: ABNORMAL
LEUKOCYTE ESTERASE UR-ACNC: NEGATIVE — SIGNIFICANT CHANGE UP
LYMPHOCYTES # BLD AUTO: 0.34 K/UL — LOW (ref 1–3.3)
LYMPHOCYTES # BLD AUTO: 0.5 K/UL — LOW (ref 1–3.3)
LYMPHOCYTES # BLD AUTO: 0.71 K/UL — LOW (ref 1–3.3)
LYMPHOCYTES # BLD AUTO: 0.92 K/UL — LOW (ref 1–3.3)
LYMPHOCYTES # BLD AUTO: 0.95 K/UL — LOW (ref 1–3.3)
LYMPHOCYTES # BLD AUTO: 0.97 K/UL — LOW (ref 1–3.3)
LYMPHOCYTES # BLD AUTO: 1.02 K/UL — SIGNIFICANT CHANGE UP (ref 1–3.3)
LYMPHOCYTES # BLD AUTO: 1.08 K/UL — SIGNIFICANT CHANGE UP (ref 1–3.3)
LYMPHOCYTES # BLD AUTO: 1.27 K/UL — SIGNIFICANT CHANGE UP (ref 1–3.3)
LYMPHOCYTES # BLD AUTO: 1.42 K/UL — SIGNIFICANT CHANGE UP (ref 1–3.3)
LYMPHOCYTES # BLD AUTO: 1.77 K/UL — SIGNIFICANT CHANGE UP (ref 1–3.3)
LYMPHOCYTES # BLD AUTO: 1.83 K/UL — SIGNIFICANT CHANGE UP (ref 1–3.3)
LYMPHOCYTES # BLD AUTO: 1.92 K/UL — SIGNIFICANT CHANGE UP (ref 1–3.3)
LYMPHOCYTES # BLD AUTO: 11.8 % — LOW (ref 13–44)
LYMPHOCYTES # BLD AUTO: 11.8 % — LOW (ref 13–44)
LYMPHOCYTES # BLD AUTO: 12.3 % — LOW (ref 13–44)
LYMPHOCYTES # BLD AUTO: 12.3 % — LOW (ref 13–44)
LYMPHOCYTES # BLD AUTO: 13.6 % — SIGNIFICANT CHANGE UP (ref 13–44)
LYMPHOCYTES # BLD AUTO: 17.4 % — SIGNIFICANT CHANGE UP (ref 13–44)
LYMPHOCYTES # BLD AUTO: 18.6 % — SIGNIFICANT CHANGE UP (ref 13–44)
LYMPHOCYTES # BLD AUTO: 21.8 % — SIGNIFICANT CHANGE UP (ref 13–44)
LYMPHOCYTES # BLD AUTO: 24.1 % — SIGNIFICANT CHANGE UP (ref 13–44)
LYMPHOCYTES # BLD AUTO: 26.6 % — SIGNIFICANT CHANGE UP (ref 13–44)
LYMPHOCYTES # BLD AUTO: 3.4 % — LOW (ref 13–44)
LYMPHOCYTES # BLD AUTO: 7.7 % — LOW (ref 13–44)
LYMPHOCYTES # BLD AUTO: 8.7 % — LOW (ref 13–44)
MACROCYTES BLD QL: SLIGHT — SIGNIFICANT CHANGE UP
MAGNESIUM SERPL-MCNC: 1.5 MG/DL — LOW (ref 1.6–2.6)
MAGNESIUM SERPL-MCNC: 1.6 MG/DL — SIGNIFICANT CHANGE UP (ref 1.6–2.6)
MAGNESIUM SERPL-MCNC: 1.7 MG/DL — SIGNIFICANT CHANGE UP (ref 1.6–2.6)
MAGNESIUM SERPL-MCNC: 1.7 MG/DL — SIGNIFICANT CHANGE UP (ref 1.6–2.6)
MAGNESIUM SERPL-MCNC: 1.8 MG/DL — SIGNIFICANT CHANGE UP (ref 1.6–2.6)
MAGNESIUM SERPL-MCNC: 1.9 MG/DL — SIGNIFICANT CHANGE UP (ref 1.6–2.6)
MAGNESIUM SERPL-MCNC: 2 MG/DL — SIGNIFICANT CHANGE UP (ref 1.6–2.6)
MAGNESIUM SERPL-MCNC: 2.1 MG/DL — SIGNIFICANT CHANGE UP (ref 1.6–2.6)
MAGNESIUM SERPL-MCNC: 2.2 MG/DL — SIGNIFICANT CHANGE UP (ref 1.6–2.6)
MAGNESIUM SERPL-MCNC: 2.3 MG/DL — SIGNIFICANT CHANGE UP (ref 1.6–2.6)
MAGNESIUM SERPL-MCNC: 2.4 MG/DL — SIGNIFICANT CHANGE UP (ref 1.6–2.6)
MANUAL SMEAR VERIFICATION: SIGNIFICANT CHANGE UP
MCHC RBC-ENTMCNC: 25.3 PG — LOW (ref 27–34)
MCHC RBC-ENTMCNC: 25.7 PG — LOW (ref 27–34)
MCHC RBC-ENTMCNC: 25.7 PG — LOW (ref 27–34)
MCHC RBC-ENTMCNC: 25.8 PG — LOW (ref 27–34)
MCHC RBC-ENTMCNC: 25.9 PG — LOW (ref 27–34)
MCHC RBC-ENTMCNC: 26 PG — LOW (ref 27–34)
MCHC RBC-ENTMCNC: 26.2 PG — LOW (ref 27–34)
MCHC RBC-ENTMCNC: 26.3 PG — LOW (ref 27–34)
MCHC RBC-ENTMCNC: 26.4 PG — LOW (ref 27–34)
MCHC RBC-ENTMCNC: 26.5 PG — LOW (ref 27–34)
MCHC RBC-ENTMCNC: 26.6 PG — LOW (ref 27–34)
MCHC RBC-ENTMCNC: 26.6 PG — LOW (ref 27–34)
MCHC RBC-ENTMCNC: 26.7 PG — LOW (ref 27–34)
MCHC RBC-ENTMCNC: 26.8 PG — LOW (ref 27–34)
MCHC RBC-ENTMCNC: 26.9 PG — LOW (ref 27–34)
MCHC RBC-ENTMCNC: 26.9 PG — LOW (ref 27–34)
MCHC RBC-ENTMCNC: 27 PG — SIGNIFICANT CHANGE UP (ref 27–34)
MCHC RBC-ENTMCNC: 27 PG — SIGNIFICANT CHANGE UP (ref 27–34)
MCHC RBC-ENTMCNC: 27.1 PG — SIGNIFICANT CHANGE UP (ref 27–34)
MCHC RBC-ENTMCNC: 27.1 PG — SIGNIFICANT CHANGE UP (ref 27–34)
MCHC RBC-ENTMCNC: 27.2 PG — SIGNIFICANT CHANGE UP (ref 27–34)
MCHC RBC-ENTMCNC: 27.3 GM/DL — LOW (ref 32–36)
MCHC RBC-ENTMCNC: 27.3 GM/DL — LOW (ref 32–36)
MCHC RBC-ENTMCNC: 27.3 PG — SIGNIFICANT CHANGE UP (ref 27–34)
MCHC RBC-ENTMCNC: 27.3 PG — SIGNIFICANT CHANGE UP (ref 27–34)
MCHC RBC-ENTMCNC: 27.4 GM/DL — LOW (ref 32–36)
MCHC RBC-ENTMCNC: 27.4 PG — SIGNIFICANT CHANGE UP (ref 27–34)
MCHC RBC-ENTMCNC: 27.4 PG — SIGNIFICANT CHANGE UP (ref 27–34)
MCHC RBC-ENTMCNC: 27.5 GM/DL — LOW (ref 32–36)
MCHC RBC-ENTMCNC: 27.5 PG — SIGNIFICANT CHANGE UP (ref 27–34)
MCHC RBC-ENTMCNC: 27.6 PG — SIGNIFICANT CHANGE UP (ref 27–34)
MCHC RBC-ENTMCNC: 27.6 PG — SIGNIFICANT CHANGE UP (ref 27–34)
MCHC RBC-ENTMCNC: 27.7 GM/DL — LOW (ref 32–36)
MCHC RBC-ENTMCNC: 27.7 PG — SIGNIFICANT CHANGE UP (ref 27–34)
MCHC RBC-ENTMCNC: 27.8 G/DL — LOW (ref 32–36)
MCHC RBC-ENTMCNC: 27.8 G/DL — LOW (ref 32–36)
MCHC RBC-ENTMCNC: 27.8 GM/DL — LOW (ref 32–36)
MCHC RBC-ENTMCNC: 27.8 GM/DL — LOW (ref 32–36)
MCHC RBC-ENTMCNC: 27.9 GM/DL — LOW (ref 32–36)
MCHC RBC-ENTMCNC: 27.9 GM/DL — LOW (ref 32–36)
MCHC RBC-ENTMCNC: 27.9 PG — SIGNIFICANT CHANGE UP (ref 27–34)
MCHC RBC-ENTMCNC: 28 G/DL — LOW (ref 32–36)
MCHC RBC-ENTMCNC: 28 GM/DL — LOW (ref 32–36)
MCHC RBC-ENTMCNC: 28.1 GM/DL — LOW (ref 32–36)
MCHC RBC-ENTMCNC: 28.3 G/DL — LOW (ref 32–36)
MCHC RBC-ENTMCNC: 28.4 GM/DL — LOW (ref 32–36)
MCHC RBC-ENTMCNC: 28.5 G/DL — LOW (ref 32–36)
MCHC RBC-ENTMCNC: 28.5 GM/DL — LOW (ref 32–36)
MCHC RBC-ENTMCNC: 28.6 G/DL — LOW (ref 32–36)
MCHC RBC-ENTMCNC: 28.7 G/DL — LOW (ref 32–36)
MCHC RBC-ENTMCNC: 28.8 G/DL — LOW (ref 32–36)
MCHC RBC-ENTMCNC: 28.8 GM/DL — LOW (ref 32–36)
MCHC RBC-ENTMCNC: 28.9 G/DL — LOW (ref 32–36)
MCHC RBC-ENTMCNC: 28.9 G/DL — LOW (ref 32–36)
MCHC RBC-ENTMCNC: 28.9 GM/DL — LOW (ref 32–36)
MCHC RBC-ENTMCNC: 29.1 G/DL — LOW (ref 32–36)
MCHC RBC-ENTMCNC: 29.1 GM/DL — LOW (ref 32–36)
MCHC RBC-ENTMCNC: 29.1 GM/DL — LOW (ref 32–36)
MCHC RBC-ENTMCNC: 29.2 GM/DL — LOW (ref 32–36)
MCHC RBC-ENTMCNC: 29.3 G/DL — LOW (ref 32–36)
MCHC RBC-ENTMCNC: 29.3 G/DL — LOW (ref 32–36)
MCHC RBC-ENTMCNC: 29.3 GM/DL — LOW (ref 32–36)
MCHC RBC-ENTMCNC: 29.4 G/DL — LOW (ref 32–36)
MCHC RBC-ENTMCNC: 29.4 G/DL — LOW (ref 32–36)
MCHC RBC-ENTMCNC: 29.4 GM/DL — LOW (ref 32–36)
MCHC RBC-ENTMCNC: 29.4 GM/DL — LOW (ref 32–36)
MCHC RBC-ENTMCNC: 29.5 G/DL — LOW (ref 32–36)
MCHC RBC-ENTMCNC: 29.6 G/DL — LOW (ref 32–36)
MCHC RBC-ENTMCNC: 29.7 G/DL — LOW (ref 32–36)
MCHC RBC-ENTMCNC: 29.8 G/DL — LOW (ref 32–36)
MCHC RBC-ENTMCNC: 30 G/DL — LOW (ref 32–36)
MCHC RBC-ENTMCNC: 30 GM/DL — LOW (ref 32–36)
MCHC RBC-ENTMCNC: 30.1 G/DL — LOW (ref 32–36)
MCHC RBC-ENTMCNC: 30.1 G/DL — LOW (ref 32–36)
MCHC RBC-ENTMCNC: 30.2 G/DL — LOW (ref 32–36)
MCHC RBC-ENTMCNC: 30.6 G/DL — LOW (ref 32–36)
MCHC RBC-ENTMCNC: 31 G/DL — LOW (ref 32–36)
MCV RBC AUTO: 84.7 FL — SIGNIFICANT CHANGE UP (ref 80–100)
MCV RBC AUTO: 86.9 FL — SIGNIFICANT CHANGE UP (ref 80–100)
MCV RBC AUTO: 88.2 FL — SIGNIFICANT CHANGE UP (ref 80–100)
MCV RBC AUTO: 88.4 FL — SIGNIFICANT CHANGE UP (ref 80–100)
MCV RBC AUTO: 88.5 FL — SIGNIFICANT CHANGE UP (ref 80–100)
MCV RBC AUTO: 88.6 FL — SIGNIFICANT CHANGE UP (ref 80–100)
MCV RBC AUTO: 88.7 FL — SIGNIFICANT CHANGE UP (ref 80–100)
MCV RBC AUTO: 88.9 FL — SIGNIFICANT CHANGE UP (ref 80–100)
MCV RBC AUTO: 89 FL — SIGNIFICANT CHANGE UP (ref 80–100)
MCV RBC AUTO: 89.4 FL — SIGNIFICANT CHANGE UP (ref 80–100)
MCV RBC AUTO: 89.5 FL — SIGNIFICANT CHANGE UP (ref 80–100)
MCV RBC AUTO: 89.7 FL — SIGNIFICANT CHANGE UP (ref 80–100)
MCV RBC AUTO: 89.8 FL — SIGNIFICANT CHANGE UP (ref 80–100)
MCV RBC AUTO: 89.8 FL — SIGNIFICANT CHANGE UP (ref 80–100)
MCV RBC AUTO: 90 FL — SIGNIFICANT CHANGE UP (ref 80–100)
MCV RBC AUTO: 90.1 FL — SIGNIFICANT CHANGE UP (ref 80–100)
MCV RBC AUTO: 90.1 FL — SIGNIFICANT CHANGE UP (ref 80–100)
MCV RBC AUTO: 90.4 FL — SIGNIFICANT CHANGE UP (ref 80–100)
MCV RBC AUTO: 90.6 FL — SIGNIFICANT CHANGE UP (ref 80–100)
MCV RBC AUTO: 90.7 FL — SIGNIFICANT CHANGE UP (ref 80–100)
MCV RBC AUTO: 91 FL — SIGNIFICANT CHANGE UP (ref 80–100)
MCV RBC AUTO: 92.3 FL — SIGNIFICANT CHANGE UP (ref 80–100)
MCV RBC AUTO: 92.5 FL — SIGNIFICANT CHANGE UP (ref 80–100)
MCV RBC AUTO: 92.6 FL — SIGNIFICANT CHANGE UP (ref 80–100)
MCV RBC AUTO: 92.6 FL — SIGNIFICANT CHANGE UP (ref 80–100)
MCV RBC AUTO: 92.7 FL — SIGNIFICANT CHANGE UP (ref 80–100)
MCV RBC AUTO: 92.7 FL — SIGNIFICANT CHANGE UP (ref 80–100)
MCV RBC AUTO: 93 FL — SIGNIFICANT CHANGE UP (ref 80–100)
MCV RBC AUTO: 93.1 FL — SIGNIFICANT CHANGE UP (ref 80–100)
MCV RBC AUTO: 93.7 FL — SIGNIFICANT CHANGE UP (ref 80–100)
MCV RBC AUTO: 93.7 FL — SIGNIFICANT CHANGE UP (ref 80–100)
MCV RBC AUTO: 93.8 FL — SIGNIFICANT CHANGE UP (ref 80–100)
MCV RBC AUTO: 94.1 FL — SIGNIFICANT CHANGE UP (ref 80–100)
MCV RBC AUTO: 94.6 FL — SIGNIFICANT CHANGE UP (ref 80–100)
MCV RBC AUTO: 94.8 FL — SIGNIFICANT CHANGE UP (ref 80–100)
MCV RBC AUTO: 94.8 FL — SIGNIFICANT CHANGE UP (ref 80–100)
MCV RBC AUTO: 94.9 FL — SIGNIFICANT CHANGE UP (ref 80–100)
MCV RBC AUTO: 95 FL — SIGNIFICANT CHANGE UP (ref 80–100)
MCV RBC AUTO: 95.5 FL — SIGNIFICANT CHANGE UP (ref 80–100)
MCV RBC AUTO: 95.7 FL — SIGNIFICANT CHANGE UP (ref 80–100)
MCV RBC AUTO: 96.3 FL — SIGNIFICANT CHANGE UP (ref 80–100)
MCV RBC AUTO: 96.4 FL — SIGNIFICANT CHANGE UP (ref 80–100)
MCV RBC AUTO: 96.5 FL — SIGNIFICANT CHANGE UP (ref 80–100)
MCV RBC AUTO: 96.7 FL — SIGNIFICANT CHANGE UP (ref 80–100)
MCV RBC AUTO: 96.8 FL — SIGNIFICANT CHANGE UP (ref 80–100)
MCV RBC AUTO: 97.7 FL — SIGNIFICANT CHANGE UP (ref 80–100)
MCV RBC AUTO: 97.7 FL — SIGNIFICANT CHANGE UP (ref 80–100)
MCV RBC AUTO: 98.4 FL — SIGNIFICANT CHANGE UP (ref 80–100)
MCV RBC AUTO: 98.8 FL — SIGNIFICANT CHANGE UP (ref 80–100)
MCV RBC AUTO: 99 FL — SIGNIFICANT CHANGE UP (ref 80–100)
MCV RBC AUTO: 99.1 FL — SIGNIFICANT CHANGE UP (ref 80–100)
MCV RBC AUTO: 99.2 FL — SIGNIFICANT CHANGE UP (ref 80–100)
MCV RBC AUTO: 99.6 FL — SIGNIFICANT CHANGE UP (ref 80–100)
MELD SCORE WITH DIALYSIS: 24 POINTS — SIGNIFICANT CHANGE UP
MELD SCORE WITHOUT DIALYSIS: 8 POINTS — SIGNIFICANT CHANGE UP
METAMYELOCYTES # FLD: 0.9 % — HIGH (ref 0–0)
METAMYELOCYTES NFR BLD: 0.9 % — HIGH (ref 0–0)
METHOD TYPE: SIGNIFICANT CHANGE UP
MICROCYTES BLD QL: SLIGHT — SIGNIFICANT CHANGE UP
MONOCYTES # BLD AUTO: 0.19 K/UL — SIGNIFICANT CHANGE UP (ref 0–0.9)
MONOCYTES # BLD AUTO: 0.25 K/UL — SIGNIFICANT CHANGE UP (ref 0–0.9)
MONOCYTES # BLD AUTO: 0.27 K/UL — SIGNIFICANT CHANGE UP (ref 0–0.9)
MONOCYTES # BLD AUTO: 0.27 K/UL — SIGNIFICANT CHANGE UP (ref 0–0.9)
MONOCYTES # BLD AUTO: 0.3 K/UL — SIGNIFICANT CHANGE UP (ref 0–0.9)
MONOCYTES # BLD AUTO: 0.32 K/UL — SIGNIFICANT CHANGE UP (ref 0–0.9)
MONOCYTES # BLD AUTO: 0.33 K/UL — SIGNIFICANT CHANGE UP (ref 0–0.9)
MONOCYTES # BLD AUTO: 0.36 K/UL — SIGNIFICANT CHANGE UP (ref 0–0.9)
MONOCYTES # BLD AUTO: 0.38 K/UL — SIGNIFICANT CHANGE UP (ref 0–0.9)
MONOCYTES # BLD AUTO: 0.43 K/UL — SIGNIFICANT CHANGE UP (ref 0–0.9)
MONOCYTES # BLD AUTO: 0.52 K/UL — SIGNIFICANT CHANGE UP (ref 0–0.9)
MONOCYTES # BLD AUTO: 0.6 K/UL — SIGNIFICANT CHANGE UP (ref 0–0.9)
MONOCYTES # BLD AUTO: 0.69 K/UL — SIGNIFICANT CHANGE UP (ref 0–0.9)
MONOCYTES NFR BLD AUTO: 1.7 % — LOW (ref 2–14)
MONOCYTES NFR BLD AUTO: 3.5 % — SIGNIFICANT CHANGE UP (ref 2–14)
MONOCYTES NFR BLD AUTO: 3.5 % — SIGNIFICANT CHANGE UP (ref 2–14)
MONOCYTES NFR BLD AUTO: 3.6 % — SIGNIFICANT CHANGE UP (ref 2–14)
MONOCYTES NFR BLD AUTO: 3.9 % — SIGNIFICANT CHANGE UP (ref 2–14)
MONOCYTES NFR BLD AUTO: 4 % — SIGNIFICANT CHANGE UP (ref 2–14)
MONOCYTES NFR BLD AUTO: 4.3 % — SIGNIFICANT CHANGE UP (ref 2–14)
MONOCYTES NFR BLD AUTO: 4.4 % — SIGNIFICANT CHANGE UP (ref 2–14)
MONOCYTES NFR BLD AUTO: 5.2 % — SIGNIFICANT CHANGE UP (ref 2–14)
MONOCYTES NFR BLD AUTO: 5.3 % — SIGNIFICANT CHANGE UP (ref 2–14)
MONOCYTES NFR BLD AUTO: 6.8 % — SIGNIFICANT CHANGE UP (ref 2–14)
MONOCYTES NFR BLD AUTO: 8.4 % — SIGNIFICANT CHANGE UP (ref 2–14)
MONOCYTES NFR BLD AUTO: 9.7 % — SIGNIFICANT CHANGE UP (ref 2–14)
MRSA PCR RESULT.: SIGNIFICANT CHANGE UP
MYELOCYTES NFR BLD: 0.9 % — HIGH (ref 0–0)
MYELOCYTES NFR BLD: 1.8 % — HIGH (ref 0–0)
MYELOCYTES NFR BLD: 3.5 % — HIGH (ref 0–0)
MYELOCYTES NFR BLD: 3.5 % — HIGH (ref 0–0)
NEUTROPHILS # BLD AUTO: 10.9 K/UL — HIGH (ref 1.8–7.4)
NEUTROPHILS # BLD AUTO: 12.21 K/UL — HIGH (ref 1.8–7.4)
NEUTROPHILS # BLD AUTO: 12.47 K/UL — HIGH (ref 1.8–7.4)
NEUTROPHILS # BLD AUTO: 13.78 K/UL — HIGH (ref 1.8–7.4)
NEUTROPHILS # BLD AUTO: 2.06 K/UL — SIGNIFICANT CHANGE UP (ref 1.8–7.4)
NEUTROPHILS # BLD AUTO: 2.33 K/UL — SIGNIFICANT CHANGE UP (ref 1.8–7.4)
NEUTROPHILS # BLD AUTO: 3.2 K/UL — SIGNIFICANT CHANGE UP (ref 1.8–7.4)
NEUTROPHILS # BLD AUTO: 3.35 K/UL — SIGNIFICANT CHANGE UP (ref 1.8–7.4)
NEUTROPHILS # BLD AUTO: 5.45 K/UL — SIGNIFICANT CHANGE UP (ref 1.8–7.4)
NEUTROPHILS # BLD AUTO: 5.57 K/UL — SIGNIFICANT CHANGE UP (ref 1.8–7.4)
NEUTROPHILS # BLD AUTO: 7.18 K/UL — SIGNIFICANT CHANGE UP (ref 1.8–7.4)
NEUTROPHILS # BLD AUTO: 7.22 K/UL — SIGNIFICANT CHANGE UP (ref 1.8–7.4)
NEUTROPHILS # BLD AUTO: 7.96 K/UL — HIGH (ref 1.8–7.4)
NEUTROPHILS NFR BLD AUTO: 60.9 % — SIGNIFICANT CHANGE UP (ref 43–77)
NEUTROPHILS NFR BLD AUTO: 62.9 % — SIGNIFICANT CHANGE UP (ref 43–77)
NEUTROPHILS NFR BLD AUTO: 66.6 % — SIGNIFICANT CHANGE UP (ref 43–77)
NEUTROPHILS NFR BLD AUTO: 66.9 % — SIGNIFICANT CHANGE UP (ref 43–77)
NEUTROPHILS NFR BLD AUTO: 71.9 % — SIGNIFICANT CHANGE UP (ref 43–77)
NEUTROPHILS NFR BLD AUTO: 72.1 % — SIGNIFICANT CHANGE UP (ref 43–77)
NEUTROPHILS NFR BLD AUTO: 73.4 % — SIGNIFICANT CHANGE UP (ref 43–77)
NEUTROPHILS NFR BLD AUTO: 79.8 % — HIGH (ref 43–77)
NEUTROPHILS NFR BLD AUTO: 80.3 % — HIGH (ref 43–77)
NEUTROPHILS NFR BLD AUTO: 82.7 % — HIGH (ref 43–77)
NEUTROPHILS NFR BLD AUTO: 83 % — HIGH (ref 43–77)
NEUTROPHILS NFR BLD AUTO: 86.5 % — HIGH (ref 43–77)
NEUTROPHILS NFR BLD AUTO: 87.8 % — HIGH (ref 43–77)
NEUTS BAND # BLD: 11.4 % — HIGH (ref 0–8)
NEUTS BAND # BLD: 12.8 % — HIGH (ref 0–8)
NEUTS BAND # BLD: 2.6 % — SIGNIFICANT CHANGE UP (ref 0–8)
NEUTS BAND # BLD: 7.8 % — SIGNIFICANT CHANGE UP (ref 0–8)
NITRITE UR-MCNC: NEGATIVE — SIGNIFICANT CHANGE UP
NITRITE UR-MCNC: POSITIVE
NRBC # BLD: 0 /100 WBCS — SIGNIFICANT CHANGE UP (ref 0–0)
NRBC # BLD: 1 /100 WBCS — HIGH (ref 0–0)
NRBC BLD-RTO: 0 /100 WBCS — SIGNIFICANT CHANGE UP (ref 0–0)
NRBC BLD-RTO: 1 /100 WBCS — HIGH (ref 0–0)
NT-PROBNP SERPL-SCNC: 592 PG/ML — HIGH (ref 0–300)
OB PNL STL: POSITIVE
ORGANISM # SPEC MICROSCOPIC CNT: ABNORMAL
OVALOCYTES BLD QL SMEAR: SLIGHT — SIGNIFICANT CHANGE UP
OVALOCYTES BLD QL SMEAR: SLIGHT — SIGNIFICANT CHANGE UP
PCO2 BLDV: 20 MMHG — LOW (ref 39–42)
PH BLDV: 7.54 — HIGH (ref 7.32–7.43)
PH UR: 5 — SIGNIFICANT CHANGE UP (ref 5–8)
PH UR: 6 — SIGNIFICANT CHANGE UP (ref 5–8)
PH UR: 6 — SIGNIFICANT CHANGE UP (ref 5–8)
PH UR: 6.5 — SIGNIFICANT CHANGE UP (ref 5–8)
PH UR: 6.5 — SIGNIFICANT CHANGE UP (ref 5–8)
PH UR: 7.5 — SIGNIFICANT CHANGE UP (ref 5–8)
PHOSPHATE SERPL-MCNC: 1.2 MG/DL — LOW (ref 2.5–4.5)
PHOSPHATE SERPL-MCNC: 1.9 MG/DL — LOW (ref 2.5–4.5)
PHOSPHATE SERPL-MCNC: 1.9 MG/DL — LOW (ref 2.5–4.5)
PHOSPHATE SERPL-MCNC: 2 MG/DL — LOW (ref 2.5–4.5)
PHOSPHATE SERPL-MCNC: 2.2 MG/DL — LOW (ref 2.5–4.5)
PHOSPHATE SERPL-MCNC: 2.3 MG/DL — LOW (ref 2.5–4.5)
PHOSPHATE SERPL-MCNC: 2.4 MG/DL — LOW (ref 2.5–4.5)
PHOSPHATE SERPL-MCNC: 2.4 MG/DL — LOW (ref 2.5–4.5)
PHOSPHATE SERPL-MCNC: 2.5 MG/DL — SIGNIFICANT CHANGE UP (ref 2.5–4.5)
PHOSPHATE SERPL-MCNC: 2.6 MG/DL — SIGNIFICANT CHANGE UP (ref 2.5–4.5)
PHOSPHATE SERPL-MCNC: 2.7 MG/DL — SIGNIFICANT CHANGE UP (ref 2.5–4.5)
PHOSPHATE SERPL-MCNC: 2.8 MG/DL — SIGNIFICANT CHANGE UP (ref 2.5–4.5)
PHOSPHATE SERPL-MCNC: 2.9 MG/DL — SIGNIFICANT CHANGE UP (ref 2.5–4.5)
PHOSPHATE SERPL-MCNC: 2.9 MG/DL — SIGNIFICANT CHANGE UP (ref 2.5–4.5)
PHOSPHATE SERPL-MCNC: 3 MG/DL — SIGNIFICANT CHANGE UP (ref 2.5–4.5)
PHOSPHATE SERPL-MCNC: 3.1 MG/DL — SIGNIFICANT CHANGE UP (ref 2.5–4.5)
PHOSPHATE SERPL-MCNC: 3.2 MG/DL — SIGNIFICANT CHANGE UP (ref 2.5–4.5)
PHOSPHATE SERPL-MCNC: 3.3 MG/DL — SIGNIFICANT CHANGE UP (ref 2.5–4.5)
PHOSPHATE SERPL-MCNC: 3.4 MG/DL — SIGNIFICANT CHANGE UP (ref 2.5–4.5)
PHOSPHATE SERPL-MCNC: 3.5 MG/DL — SIGNIFICANT CHANGE UP (ref 2.5–4.5)
PHOSPHATE SERPL-MCNC: 3.6 MG/DL — SIGNIFICANT CHANGE UP (ref 2.5–4.5)
PHOSPHATE SERPL-MCNC: 3.6 MG/DL — SIGNIFICANT CHANGE UP (ref 2.5–4.5)
PHOSPHATE SERPL-MCNC: 3.8 MG/DL — SIGNIFICANT CHANGE UP (ref 2.5–4.5)
PHOSPHATE SERPL-MCNC: 3.8 MG/DL — SIGNIFICANT CHANGE UP (ref 2.5–4.5)
PHOSPHATE SERPL-MCNC: 3.9 MG/DL — SIGNIFICANT CHANGE UP (ref 2.5–4.5)
PHOSPHATE SERPL-MCNC: 3.9 MG/DL — SIGNIFICANT CHANGE UP (ref 2.5–4.5)
PHOSPHATE SERPL-MCNC: 4.2 MG/DL — SIGNIFICANT CHANGE UP (ref 2.5–4.5)
PHOSPHATE SERPL-MCNC: 4.7 MG/DL — HIGH (ref 2.5–4.5)
PHOSPHATE SERPL-MCNC: 5.3 MG/DL — HIGH (ref 2.5–4.5)
PLAT MORPH BLD: NORMAL — SIGNIFICANT CHANGE UP
PLATELET # BLD AUTO: 100 K/UL — LOW (ref 150–400)
PLATELET # BLD AUTO: 102 K/UL — LOW (ref 150–400)
PLATELET # BLD AUTO: 106 K/UL — LOW (ref 150–400)
PLATELET # BLD AUTO: 111 K/UL — LOW (ref 150–400)
PLATELET # BLD AUTO: 113 K/UL — LOW (ref 150–400)
PLATELET # BLD AUTO: 114 K/UL — LOW (ref 150–400)
PLATELET # BLD AUTO: 122 K/UL — LOW (ref 150–400)
PLATELET # BLD AUTO: 122 K/UL — LOW (ref 150–400)
PLATELET # BLD AUTO: 124 K/UL — LOW (ref 150–400)
PLATELET # BLD AUTO: 124 K/UL — LOW (ref 150–400)
PLATELET # BLD AUTO: 126 K/UL — LOW (ref 150–400)
PLATELET # BLD AUTO: 128 K/UL — LOW (ref 150–400)
PLATELET # BLD AUTO: 129 K/UL — LOW (ref 150–400)
PLATELET # BLD AUTO: 130 K/UL — LOW (ref 150–400)
PLATELET # BLD AUTO: 134 K/UL — LOW (ref 150–400)
PLATELET # BLD AUTO: 134 K/UL — LOW (ref 150–400)
PLATELET # BLD AUTO: 136 K/UL — LOW (ref 150–400)
PLATELET # BLD AUTO: 137 K/UL — LOW (ref 150–400)
PLATELET # BLD AUTO: 139 K/UL — LOW (ref 150–400)
PLATELET # BLD AUTO: 140 K/UL — LOW (ref 150–400)
PLATELET # BLD AUTO: 143 K/UL — LOW (ref 150–400)
PLATELET # BLD AUTO: 145 K/UL — LOW (ref 150–400)
PLATELET # BLD AUTO: 146 K/UL — LOW (ref 150–400)
PLATELET # BLD AUTO: 148 K/UL — LOW (ref 150–400)
PLATELET # BLD AUTO: 149 K/UL — LOW (ref 150–400)
PLATELET # BLD AUTO: 150 K/UL — SIGNIFICANT CHANGE UP (ref 150–400)
PLATELET # BLD AUTO: 152 K/UL — SIGNIFICANT CHANGE UP (ref 150–400)
PLATELET # BLD AUTO: 156 K/UL — SIGNIFICANT CHANGE UP (ref 150–400)
PLATELET # BLD AUTO: 158 K/UL — SIGNIFICANT CHANGE UP (ref 150–400)
PLATELET # BLD AUTO: 159 K/UL — SIGNIFICANT CHANGE UP (ref 150–400)
PLATELET # BLD AUTO: 159 K/UL — SIGNIFICANT CHANGE UP (ref 150–400)
PLATELET # BLD AUTO: 164 K/UL — SIGNIFICANT CHANGE UP (ref 150–400)
PLATELET # BLD AUTO: 166 K/UL — SIGNIFICANT CHANGE UP (ref 150–400)
PLATELET # BLD AUTO: 166 K/UL — SIGNIFICANT CHANGE UP (ref 150–400)
PLATELET # BLD AUTO: 172 K/UL — SIGNIFICANT CHANGE UP (ref 150–400)
PLATELET # BLD AUTO: 174 K/UL — SIGNIFICANT CHANGE UP (ref 150–400)
PLATELET # BLD AUTO: 175 K/UL — SIGNIFICANT CHANGE UP (ref 150–400)
PLATELET # BLD AUTO: 176 K/UL — SIGNIFICANT CHANGE UP (ref 150–400)
PLATELET # BLD AUTO: 179 K/UL — SIGNIFICANT CHANGE UP (ref 150–400)
PLATELET # BLD AUTO: 183 K/UL — SIGNIFICANT CHANGE UP (ref 150–400)
PLATELET # BLD AUTO: 183 K/UL — SIGNIFICANT CHANGE UP (ref 150–400)
PLATELET # BLD AUTO: 202 K/UL — SIGNIFICANT CHANGE UP (ref 150–400)
PLATELET # BLD AUTO: 203 K/UL — SIGNIFICANT CHANGE UP (ref 150–400)
PLATELET # BLD AUTO: 65 K/UL — LOW (ref 150–400)
PLATELET # BLD AUTO: 68 K/UL — LOW (ref 150–400)
PLATELET # BLD AUTO: 70 K/UL — LOW (ref 150–400)
PLATELET # BLD AUTO: 71 K/UL — LOW (ref 150–400)
PLATELET # BLD AUTO: 74 K/UL — LOW (ref 150–400)
PLATELET # BLD AUTO: 83 K/UL — LOW (ref 150–400)
PLATELET # BLD AUTO: 89 K/UL — LOW (ref 150–400)
PLATELET # BLD AUTO: 95 K/UL — LOW (ref 150–400)
PO2 BLDV: 162 MMHG — HIGH (ref 25–45)
POIKILOCYTOSIS BLD QL AUTO: SIGNIFICANT CHANGE UP
POIKILOCYTOSIS BLD QL AUTO: SLIGHT — SIGNIFICANT CHANGE UP
POLYCHROMASIA BLD QL SMEAR: SLIGHT — SIGNIFICANT CHANGE UP
POTASSIUM SERPL-MCNC: 2.4 MMOL/L — CRITICAL LOW (ref 3.5–5.3)
POTASSIUM SERPL-MCNC: 2.9 MMOL/L — CRITICAL LOW (ref 3.5–5.3)
POTASSIUM SERPL-MCNC: 3 MMOL/L — LOW (ref 3.5–5.3)
POTASSIUM SERPL-MCNC: 3.1 MMOL/L — LOW (ref 3.5–5.3)
POTASSIUM SERPL-MCNC: 3.2 MMOL/L — LOW (ref 3.5–5.3)
POTASSIUM SERPL-MCNC: 3.3 MMOL/L — LOW (ref 3.5–5.3)
POTASSIUM SERPL-MCNC: 3.3 MMOL/L — LOW (ref 3.5–5.3)
POTASSIUM SERPL-MCNC: 3.4 MMOL/L — LOW (ref 3.5–5.3)
POTASSIUM SERPL-MCNC: 3.4 MMOL/L — LOW (ref 3.5–5.3)
POTASSIUM SERPL-MCNC: 3.5 MMOL/L — SIGNIFICANT CHANGE UP (ref 3.5–5.3)
POTASSIUM SERPL-MCNC: 3.5 MMOL/L — SIGNIFICANT CHANGE UP (ref 3.5–5.3)
POTASSIUM SERPL-MCNC: 3.7 MMOL/L — SIGNIFICANT CHANGE UP (ref 3.5–5.3)
POTASSIUM SERPL-MCNC: 3.7 MMOL/L — SIGNIFICANT CHANGE UP (ref 3.5–5.3)
POTASSIUM SERPL-MCNC: 3.8 MMOL/L — SIGNIFICANT CHANGE UP (ref 3.5–5.3)
POTASSIUM SERPL-MCNC: 3.9 MMOL/L — SIGNIFICANT CHANGE UP (ref 3.5–5.3)
POTASSIUM SERPL-MCNC: 4 MMOL/L — SIGNIFICANT CHANGE UP (ref 3.5–5.3)
POTASSIUM SERPL-MCNC: 4.1 MMOL/L — SIGNIFICANT CHANGE UP (ref 3.5–5.3)
POTASSIUM SERPL-MCNC: 4.2 MMOL/L — SIGNIFICANT CHANGE UP (ref 3.5–5.3)
POTASSIUM SERPL-MCNC: 4.3 MMOL/L — SIGNIFICANT CHANGE UP (ref 3.5–5.3)
POTASSIUM SERPL-MCNC: 4.4 MMOL/L — SIGNIFICANT CHANGE UP (ref 3.5–5.3)
POTASSIUM SERPL-MCNC: 4.4 MMOL/L — SIGNIFICANT CHANGE UP (ref 3.5–5.3)
POTASSIUM SERPL-MCNC: 4.5 MMOL/L — SIGNIFICANT CHANGE UP (ref 3.5–5.3)
POTASSIUM SERPL-MCNC: 4.6 MMOL/L — SIGNIFICANT CHANGE UP (ref 3.5–5.3)
POTASSIUM SERPL-MCNC: 4.7 MMOL/L — SIGNIFICANT CHANGE UP (ref 3.5–5.3)
POTASSIUM SERPL-MCNC: 4.9 MMOL/L — SIGNIFICANT CHANGE UP (ref 3.5–5.3)
POTASSIUM SERPL-MCNC: 4.9 MMOL/L — SIGNIFICANT CHANGE UP (ref 3.5–5.3)
POTASSIUM SERPL-MCNC: 5 MMOL/L — SIGNIFICANT CHANGE UP (ref 3.5–5.3)
POTASSIUM SERPL-MCNC: 5.1 MMOL/L — SIGNIFICANT CHANGE UP (ref 3.5–5.3)
POTASSIUM SERPL-MCNC: 5.3 MMOL/L — SIGNIFICANT CHANGE UP (ref 3.5–5.3)
POTASSIUM SERPL-MCNC: 5.4 MMOL/L — HIGH (ref 3.5–5.3)
POTASSIUM SERPL-SCNC: 2.4 MMOL/L — CRITICAL LOW (ref 3.5–5.3)
POTASSIUM SERPL-SCNC: 2.9 MMOL/L — CRITICAL LOW (ref 3.5–5.3)
POTASSIUM SERPL-SCNC: 3 MMOL/L — LOW (ref 3.5–5.3)
POTASSIUM SERPL-SCNC: 3.1 MMOL/L — LOW (ref 3.5–5.3)
POTASSIUM SERPL-SCNC: 3.2 MMOL/L — LOW (ref 3.5–5.3)
POTASSIUM SERPL-SCNC: 3.3 MMOL/L — LOW (ref 3.5–5.3)
POTASSIUM SERPL-SCNC: 3.3 MMOL/L — LOW (ref 3.5–5.3)
POTASSIUM SERPL-SCNC: 3.4 MMOL/L — LOW (ref 3.5–5.3)
POTASSIUM SERPL-SCNC: 3.4 MMOL/L — LOW (ref 3.5–5.3)
POTASSIUM SERPL-SCNC: 3.5 MMOL/L — SIGNIFICANT CHANGE UP (ref 3.5–5.3)
POTASSIUM SERPL-SCNC: 3.5 MMOL/L — SIGNIFICANT CHANGE UP (ref 3.5–5.3)
POTASSIUM SERPL-SCNC: 3.7 MMOL/L — SIGNIFICANT CHANGE UP (ref 3.5–5.3)
POTASSIUM SERPL-SCNC: 3.7 MMOL/L — SIGNIFICANT CHANGE UP (ref 3.5–5.3)
POTASSIUM SERPL-SCNC: 3.8 MMOL/L — SIGNIFICANT CHANGE UP (ref 3.5–5.3)
POTASSIUM SERPL-SCNC: 3.9 MMOL/L — SIGNIFICANT CHANGE UP (ref 3.5–5.3)
POTASSIUM SERPL-SCNC: 4 MMOL/L — SIGNIFICANT CHANGE UP (ref 3.5–5.3)
POTASSIUM SERPL-SCNC: 4.1 MMOL/L — SIGNIFICANT CHANGE UP (ref 3.5–5.3)
POTASSIUM SERPL-SCNC: 4.2 MMOL/L — SIGNIFICANT CHANGE UP (ref 3.5–5.3)
POTASSIUM SERPL-SCNC: 4.3 MMOL/L — SIGNIFICANT CHANGE UP (ref 3.5–5.3)
POTASSIUM SERPL-SCNC: 4.4 MMOL/L — SIGNIFICANT CHANGE UP (ref 3.5–5.3)
POTASSIUM SERPL-SCNC: 4.4 MMOL/L — SIGNIFICANT CHANGE UP (ref 3.5–5.3)
POTASSIUM SERPL-SCNC: 4.5 MMOL/L — SIGNIFICANT CHANGE UP (ref 3.5–5.3)
POTASSIUM SERPL-SCNC: 4.6 MMOL/L — SIGNIFICANT CHANGE UP (ref 3.5–5.3)
POTASSIUM SERPL-SCNC: 4.7 MMOL/L — SIGNIFICANT CHANGE UP (ref 3.5–5.3)
POTASSIUM SERPL-SCNC: 4.9 MMOL/L — SIGNIFICANT CHANGE UP (ref 3.5–5.3)
POTASSIUM SERPL-SCNC: 4.9 MMOL/L — SIGNIFICANT CHANGE UP (ref 3.5–5.3)
POTASSIUM SERPL-SCNC: 5 MMOL/L — SIGNIFICANT CHANGE UP (ref 3.5–5.3)
POTASSIUM SERPL-SCNC: 5.1 MMOL/L — SIGNIFICANT CHANGE UP (ref 3.5–5.3)
POTASSIUM SERPL-SCNC: 5.3 MMOL/L — SIGNIFICANT CHANGE UP (ref 3.5–5.3)
POTASSIUM SERPL-SCNC: 5.4 MMOL/L — HIGH (ref 3.5–5.3)
PROCALCITONIN SERPL-MCNC: 0.32 NG/ML — HIGH (ref 0.02–0.1)
PROCALCITONIN SERPL-MCNC: 1.11 NG/ML — HIGH (ref 0.02–0.1)
PROCALCITONIN SERPL-MCNC: 12.11 NG/ML — HIGH (ref 0.02–0.1)
PROCALCITONIN SERPL-MCNC: 13.69 NG/ML — HIGH (ref 0.02–0.1)
PROCALCITONIN SERPL-MCNC: 2.18 NG/ML — HIGH (ref 0.02–0.1)
PROCALCITONIN SERPL-MCNC: 2.92 NG/ML — HIGH (ref 0.02–0.1)
PROCALCITONIN SERPL-MCNC: 22.55 NG/ML — HIGH (ref 0.02–0.1)
PROCALCITONIN SERPL-MCNC: 23.1 NG/ML — HIGH (ref 0.02–0.1)
PROCALCITONIN SERPL-MCNC: 26.43 NG/ML — HIGH (ref 0.02–0.1)
PROCALCITONIN SERPL-MCNC: 3.69 NG/ML — HIGH (ref 0.02–0.1)
PROCALCITONIN SERPL-MCNC: 38.49 NG/ML — HIGH (ref 0.02–0.1)
PROCALCITONIN SERPL-MCNC: 4.56 NG/ML — HIGH (ref 0.02–0.1)
PROCALCITONIN SERPL-MCNC: 43 NG/ML — HIGH (ref 0.02–0.1)
PROCALCITONIN SERPL-MCNC: 47.59 NG/ML — HIGH (ref 0.02–0.1)
PROCALCITONIN SERPL-MCNC: 5.8 NG/ML — HIGH (ref 0.02–0.1)
PROCALCITONIN SERPL-MCNC: 6 NG/ML — HIGH (ref 0.02–0.1)
PROCALCITONIN SERPL-MCNC: 62.71 NG/ML — HIGH (ref 0.02–0.1)
PROCALCITONIN SERPL-MCNC: 8.48 NG/ML — HIGH (ref 0.02–0.1)
PROCALCITONIN SERPL-MCNC: 88.93 NG/ML — HIGH (ref 0.02–0.1)
PROCALCITONIN SERPL-MCNC: >100 NG/ML — HIGH (ref 0.02–0.1)
PROCALCITONIN SERPL-MCNC: >100 NG/ML — HIGH (ref 0.02–0.1)
PROMYELOCYTES # FLD: 0.9 % — HIGH (ref 0–0)
PROT SERPL-MCNC: 5.1 G/DL — LOW (ref 6–8.3)
PROT SERPL-MCNC: 5.5 G/DL — LOW (ref 6–8.3)
PROT SERPL-MCNC: 5.6 G/DL — LOW (ref 6–8.3)
PROT SERPL-MCNC: 5.7 G/DL — LOW (ref 6–8.3)
PROT SERPL-MCNC: 5.7 G/DL — LOW (ref 6–8.3)
PROT SERPL-MCNC: 5.8 G/DL — LOW (ref 6–8.3)
PROT SERPL-MCNC: 5.9 G/DL — LOW (ref 6–8.3)
PROT SERPL-MCNC: 6 G/DL — SIGNIFICANT CHANGE UP (ref 6–8.3)
PROT SERPL-MCNC: 6.1 G/DL — SIGNIFICANT CHANGE UP (ref 6–8.3)
PROT SERPL-MCNC: 6.1 G/DL — SIGNIFICANT CHANGE UP (ref 6–8.3)
PROT SERPL-MCNC: 6.2 G/DL — SIGNIFICANT CHANGE UP (ref 6–8.3)
PROT SERPL-MCNC: 6.3 G/DL — SIGNIFICANT CHANGE UP (ref 6–8.3)
PROT SERPL-MCNC: 6.3 G/DL — SIGNIFICANT CHANGE UP (ref 6–8.3)
PROT SERPL-MCNC: 6.4 G/DL — SIGNIFICANT CHANGE UP (ref 6–8.3)
PROT SERPL-MCNC: 6.5 G/DL — SIGNIFICANT CHANGE UP (ref 6–8.3)
PROT SERPL-MCNC: 6.5 G/DL — SIGNIFICANT CHANGE UP (ref 6–8.3)
PROT SERPL-MCNC: 6.6 G/DL — SIGNIFICANT CHANGE UP (ref 6–8.3)
PROT SERPL-MCNC: 6.8 G/DL — SIGNIFICANT CHANGE UP (ref 6–8.3)
PROT UR-MCNC: 30 MG/DL
PROT UR-MCNC: 30 MG/DL
PROT UR-MCNC: NEGATIVE MG/DL — SIGNIFICANT CHANGE UP
PROT UR-MCNC: SIGNIFICANT CHANGE UP MG/DL
PROTHROM AB SERPL-ACNC: 13.2 SEC — SIGNIFICANT CHANGE UP (ref 9.9–13.4)
PROTHROM AB SERPL-ACNC: 13.7 SEC — HIGH (ref 9.9–13.4)
PROTHROM AB SERPL-ACNC: 14.4 SEC — HIGH (ref 9.9–13.4)
RBC # BLD: 2.43 M/UL — LOW (ref 3.8–5.2)
RBC # BLD: 2.58 M/UL — LOW (ref 3.8–5.2)
RBC # BLD: 2.59 M/UL — LOW (ref 3.8–5.2)
RBC # BLD: 2.6 M/UL — LOW (ref 3.8–5.2)
RBC # BLD: 2.6 M/UL — LOW (ref 3.8–5.2)
RBC # BLD: 2.7 M/UL — LOW (ref 3.8–5.2)
RBC # BLD: 2.71 M/UL — LOW (ref 3.8–5.2)
RBC # BLD: 2.72 M/UL — LOW (ref 3.8–5.2)
RBC # BLD: 2.72 M/UL — LOW (ref 3.8–5.2)
RBC # BLD: 2.73 M/UL — LOW (ref 3.8–5.2)
RBC # BLD: 2.74 M/UL — LOW (ref 3.8–5.2)
RBC # BLD: 2.75 M/UL — LOW (ref 3.8–5.2)
RBC # BLD: 2.76 M/UL — LOW (ref 3.8–5.2)
RBC # BLD: 2.8 M/UL — LOW (ref 3.8–5.2)
RBC # BLD: 2.82 M/UL — LOW (ref 3.8–5.2)
RBC # BLD: 2.82 M/UL — LOW (ref 3.8–5.2)
RBC # BLD: 2.87 M/UL — LOW (ref 3.8–5.2)
RBC # BLD: 2.88 M/UL — LOW (ref 3.8–5.2)
RBC # BLD: 2.92 M/UL — LOW (ref 3.8–5.2)
RBC # BLD: 2.93 M/UL — LOW (ref 3.8–5.2)
RBC # BLD: 2.93 M/UL — LOW (ref 3.8–5.2)
RBC # BLD: 2.94 M/UL — LOW (ref 3.8–5.2)
RBC # BLD: 2.95 M/UL — LOW (ref 3.8–5.2)
RBC # BLD: 2.98 M/UL — LOW (ref 3.8–5.2)
RBC # BLD: 2.99 M/UL — LOW (ref 3.8–5.2)
RBC # BLD: 3 M/UL — LOW (ref 3.8–5.2)
RBC # BLD: 3.02 M/UL — LOW (ref 3.8–5.2)
RBC # BLD: 3.03 M/UL — LOW (ref 3.8–5.2)
RBC # BLD: 3.04 M/UL — LOW (ref 3.8–5.2)
RBC # BLD: 3.05 M/UL — LOW (ref 3.8–5.2)
RBC # BLD: 3.05 M/UL — LOW (ref 3.8–5.2)
RBC # BLD: 3.07 M/UL — LOW (ref 3.8–5.2)
RBC # BLD: 3.08 M/UL — LOW (ref 3.8–5.2)
RBC # BLD: 3.08 M/UL — LOW (ref 3.8–5.2)
RBC # BLD: 3.09 M/UL — LOW (ref 3.8–5.2)
RBC # BLD: 3.12 M/UL — LOW (ref 3.8–5.2)
RBC # BLD: 3.14 M/UL — LOW (ref 3.8–5.2)
RBC # BLD: 3.14 M/UL — LOW (ref 3.8–5.2)
RBC # BLD: 3.19 M/UL — LOW (ref 3.8–5.2)
RBC # BLD: 3.21 M/UL — LOW (ref 3.8–5.2)
RBC # BLD: 3.22 M/UL — LOW (ref 3.8–5.2)
RBC # BLD: 3.23 M/UL — LOW (ref 3.8–5.2)
RBC # BLD: 3.26 M/UL — LOW (ref 3.8–5.2)
RBC # BLD: 3.31 M/UL — LOW (ref 3.8–5.2)
RBC # BLD: 3.36 M/UL — LOW (ref 3.8–5.2)
RBC # BLD: 3.46 M/UL — LOW (ref 3.8–5.2)
RBC # BLD: 3.48 M/UL — LOW (ref 3.8–5.2)
RBC # BLD: 3.5 M/UL — LOW (ref 3.8–5.2)
RBC # BLD: 3.52 M/UL — LOW (ref 3.8–5.2)
RBC # BLD: 3.65 M/UL — LOW (ref 3.8–5.2)
RBC # FLD: 17.2 % — HIGH (ref 10.3–14.5)
RBC # FLD: 17.4 % — HIGH (ref 10.3–14.5)
RBC # FLD: 17.4 % — HIGH (ref 10.3–14.5)
RBC # FLD: 17.5 % — HIGH (ref 10.3–14.5)
RBC # FLD: 17.6 % — HIGH (ref 10.3–14.5)
RBC # FLD: 17.7 % — HIGH (ref 10.3–14.5)
RBC # FLD: 17.8 % — HIGH (ref 10.3–14.5)
RBC # FLD: 17.9 % — HIGH (ref 10.3–14.5)
RBC # FLD: 17.9 % — HIGH (ref 10.3–14.5)
RBC # FLD: 18 % — HIGH (ref 10.3–14.5)
RBC # FLD: 18.1 % — HIGH (ref 10.3–14.5)
RBC # FLD: 18.2 % — HIGH (ref 10.3–14.5)
RBC # FLD: 18.4 % — HIGH (ref 10.3–14.5)
RBC # FLD: 18.4 % — HIGH (ref 10.3–14.5)
RBC # FLD: 18.5 % — HIGH (ref 10.3–14.5)
RBC # FLD: 18.6 % — HIGH (ref 10.3–14.5)
RBC # FLD: 18.6 % — HIGH (ref 10.3–14.5)
RBC # FLD: 18.7 % — HIGH (ref 10.3–14.5)
RBC # FLD: 19 % — HIGH (ref 10.3–14.5)
RBC # FLD: 19.2 % — HIGH (ref 10.3–14.5)
RBC # FLD: 19.6 % — HIGH (ref 10.3–14.5)
RBC # FLD: 19.7 % — HIGH (ref 10.3–14.5)
RBC # FLD: 19.9 % — HIGH (ref 10.3–14.5)
RBC # FLD: 20.1 % — HIGH (ref 10.3–14.5)
RBC # FLD: 20.6 % — HIGH (ref 10.3–14.5)
RBC # FLD: 20.7 % — HIGH (ref 10.3–14.5)
RBC # FLD: 20.7 % — HIGH (ref 10.3–14.5)
RBC # FLD: 21.2 % — HIGH (ref 10.3–14.5)
RBC # FLD: 21.4 % — HIGH (ref 10.3–14.5)
RBC # FLD: 21.4 % — HIGH (ref 10.3–14.5)
RBC # FLD: 22.9 % — HIGH (ref 10.3–14.5)
RBC BLD AUTO: ABNORMAL
RBC BLD AUTO: SIGNIFICANT CHANGE UP
RBC CASTS # UR COMP ASSIST: 0 /HPF — SIGNIFICANT CHANGE UP (ref 0–4)
RBC CASTS # UR COMP ASSIST: 0 /HPF — SIGNIFICANT CHANGE UP (ref 0–4)
RBC CASTS # UR COMP ASSIST: 1 /HPF — SIGNIFICANT CHANGE UP (ref 0–4)
RBC CASTS # UR COMP ASSIST: 7 /HPF — HIGH (ref 0–4)
RETICS #: 94.6 K/UL — SIGNIFICANT CHANGE UP (ref 25–125)
RETICS/RBC NFR: 3.6 % — HIGH (ref 0.5–2.5)
REVIEW: SIGNIFICANT CHANGE UP
RH IG SCN BLD-IMP: POSITIVE — SIGNIFICANT CHANGE UP
RSV RNA NPH QL NAA+NON-PROBE: SIGNIFICANT CHANGE UP
S AUREUS DNA NOSE QL NAA+PROBE: SIGNIFICANT CHANGE UP
SAO2 % BLDV: 98.8 % — HIGH (ref 67–88)
SARS-COV-2 RNA SPEC QL NAA+PROBE: SIGNIFICANT CHANGE UP
SARS-COV-2 RNA SPEC QL NAA+PROBE: SIGNIFICANT CHANGE UP
SMUDGE CELLS # BLD: PRESENT — SIGNIFICANT CHANGE UP
SODIUM SERPL-SCNC: 129 MMOL/L — LOW (ref 135–145)
SODIUM SERPL-SCNC: 130 MMOL/L — LOW (ref 135–145)
SODIUM SERPL-SCNC: 131 MMOL/L — LOW (ref 135–145)
SODIUM SERPL-SCNC: 132 MMOL/L — LOW (ref 135–145)
SODIUM SERPL-SCNC: 133 MMOL/L — LOW (ref 135–145)
SODIUM SERPL-SCNC: 134 MMOL/L — LOW (ref 135–145)
SODIUM SERPL-SCNC: 135 MMOL/L — SIGNIFICANT CHANGE UP (ref 135–145)
SODIUM SERPL-SCNC: 136 MMOL/L — SIGNIFICANT CHANGE UP (ref 135–145)
SODIUM SERPL-SCNC: 137 MMOL/L — SIGNIFICANT CHANGE UP (ref 135–145)
SODIUM SERPL-SCNC: 137 MMOL/L — SIGNIFICANT CHANGE UP (ref 135–145)
SODIUM SERPL-SCNC: 138 MMOL/L — SIGNIFICANT CHANGE UP (ref 135–145)
SODIUM SERPL-SCNC: 139 MMOL/L — SIGNIFICANT CHANGE UP (ref 135–145)
SODIUM SERPL-SCNC: 140 MMOL/L — SIGNIFICANT CHANGE UP (ref 135–145)
SODIUM SERPL-SCNC: 141 MMOL/L — SIGNIFICANT CHANGE UP (ref 135–145)
SODIUM SERPL-SCNC: 141 MMOL/L — SIGNIFICANT CHANGE UP (ref 135–145)
SODIUM SERPL-SCNC: 143 MMOL/L — SIGNIFICANT CHANGE UP (ref 135–145)
SODIUM SERPL-SCNC: 145 MMOL/L — SIGNIFICANT CHANGE UP (ref 135–145)
SODIUM SERPL-SCNC: 146 MMOL/L — HIGH (ref 135–145)
SODIUM SERPL-SCNC: 147 MMOL/L — HIGH (ref 135–145)
SP GR SPEC: 1.01 — SIGNIFICANT CHANGE UP (ref 1–1.03)
SP GR SPEC: 1.01 — SIGNIFICANT CHANGE UP (ref 1–1.03)
SP GR SPEC: 1.02 — SIGNIFICANT CHANGE UP (ref 1–1.03)
SP GR SPEC: >1.03 — HIGH (ref 1–1.03)
SP GR SPEC: >1.03 — HIGH (ref 1–1.03)
SPECIMEN SOURCE: SIGNIFICANT CHANGE UP
SPHEROCYTES BLD QL SMEAR: SLIGHT — SIGNIFICANT CHANGE UP
SQUAMOUS # UR AUTO: 0 /HPF — SIGNIFICANT CHANGE UP (ref 0–5)
SQUAMOUS # UR AUTO: 0 /HPF — SIGNIFICANT CHANGE UP (ref 0–5)
SQUAMOUS # UR AUTO: 1 /HPF — SIGNIFICANT CHANGE UP (ref 0–5)
SQUAMOUS # UR AUTO: 9 /HPF — HIGH (ref 0–5)
STOMATOCYTES BLD QL SMEAR: SIGNIFICANT CHANGE UP
T4 FREE SERPL-MCNC: 1.2 NG/DL — SIGNIFICANT CHANGE UP (ref 0.9–1.8)
TARGETS BLD QL SMEAR: SLIGHT — SIGNIFICANT CHANGE UP
TIBC SERPL-MCNC: 162 UG/DL — LOW (ref 220–430)
TROPONIN T, HIGH SENSITIVITY RESULT: 95 NG/L — HIGH (ref 0–51)
TSH SERPL-MCNC: 1.74 UIU/ML — SIGNIFICANT CHANGE UP (ref 0.27–4.2)
UIBC SERPL-MCNC: 147 UG/DL — SIGNIFICANT CHANGE UP (ref 110–370)
UROBILINOGEN FLD QL: 0.2 MG/DL — SIGNIFICANT CHANGE UP (ref 0.2–1)
UROBILINOGEN FLD QL: 1 MG/DL — SIGNIFICANT CHANGE UP (ref 0.2–1)
VANCOMYCIN TROUGH SERPL-MCNC: 11.4 UG/ML — SIGNIFICANT CHANGE UP (ref 10–20)
VIT B12 SERPL-MCNC: 862 PG/ML — SIGNIFICANT CHANGE UP (ref 232–1245)
WBC # BLD: 10.66 K/UL — HIGH (ref 3.8–10.5)
WBC # BLD: 10.73 K/UL — HIGH (ref 3.8–10.5)
WBC # BLD: 11.06 K/UL — HIGH (ref 3.8–10.5)
WBC # BLD: 11.31 K/UL — HIGH (ref 3.8–10.5)
WBC # BLD: 11.46 K/UL — HIGH (ref 3.8–10.5)
WBC # BLD: 11.48 K/UL — HIGH (ref 3.8–10.5)
WBC # BLD: 12.19 K/UL — HIGH (ref 3.8–10.5)
WBC # BLD: 12.32 K/UL — HIGH (ref 3.8–10.5)
WBC # BLD: 12.33 K/UL — HIGH (ref 3.8–10.5)
WBC # BLD: 12.41 K/UL — HIGH (ref 3.8–10.5)
WBC # BLD: 12.47 K/UL — HIGH (ref 3.8–10.5)
WBC # BLD: 12.59 K/UL — HIGH (ref 3.8–10.5)
WBC # BLD: 12.79 K/UL — HIGH (ref 3.8–10.5)
WBC # BLD: 12.94 K/UL — HIGH (ref 3.8–10.5)
WBC # BLD: 13.09 K/UL — HIGH (ref 3.8–10.5)
WBC # BLD: 13.29 K/UL — HIGH (ref 3.8–10.5)
WBC # BLD: 13.93 K/UL — HIGH (ref 3.8–10.5)
WBC # BLD: 14.09 K/UL — HIGH (ref 3.8–10.5)
WBC # BLD: 14.19 K/UL — HIGH (ref 3.8–10.5)
WBC # BLD: 14.26 K/UL — HIGH (ref 3.8–10.5)
WBC # BLD: 14.4 K/UL — HIGH (ref 3.8–10.5)
WBC # BLD: 14.68 K/UL — HIGH (ref 3.8–10.5)
WBC # BLD: 14.8 K/UL — HIGH (ref 3.8–10.5)
WBC # BLD: 14.86 K/UL — HIGH (ref 3.8–10.5)
WBC # BLD: 15.02 K/UL — HIGH (ref 3.8–10.5)
WBC # BLD: 15.12 K/UL — HIGH (ref 3.8–10.5)
WBC # BLD: 15.15 K/UL — HIGH (ref 3.8–10.5)
WBC # BLD: 15.39 K/UL — HIGH (ref 3.8–10.5)
WBC # BLD: 15.46 K/UL — HIGH (ref 3.8–10.5)
WBC # BLD: 15.65 K/UL — HIGH (ref 3.8–10.5)
WBC # BLD: 15.69 K/UL — HIGH (ref 3.8–10.5)
WBC # BLD: 17.83 K/UL — HIGH (ref 3.8–10.5)
WBC # BLD: 2.76 K/UL — LOW (ref 3.8–10.5)
WBC # BLD: 21.43 K/UL — HIGH (ref 3.8–10.5)
WBC # BLD: 22.01 K/UL — HIGH (ref 3.8–10.5)
WBC # BLD: 22.66 K/UL — HIGH (ref 3.8–10.5)
WBC # BLD: 22.92 K/UL — HIGH (ref 3.8–10.5)
WBC # BLD: 27.15 K/UL — HIGH (ref 3.8–10.5)
WBC # BLD: 3.82 K/UL — SIGNIFICANT CHANGE UP (ref 3.8–10.5)
WBC # BLD: 4.44 K/UL — SIGNIFICANT CHANGE UP (ref 3.8–10.5)
WBC # BLD: 5.33 K/UL — SIGNIFICANT CHANGE UP (ref 3.8–10.5)
WBC # BLD: 6.24 K/UL — SIGNIFICANT CHANGE UP (ref 3.8–10.5)
WBC # BLD: 6.98 K/UL — SIGNIFICANT CHANGE UP (ref 3.8–10.5)
WBC # BLD: 7.21 K/UL — SIGNIFICANT CHANGE UP (ref 3.8–10.5)
WBC # BLD: 7.27 K/UL — SIGNIFICANT CHANGE UP (ref 3.8–10.5)
WBC # BLD: 7.29 K/UL — SIGNIFICANT CHANGE UP (ref 3.8–10.5)
WBC # BLD: 7.98 K/UL — SIGNIFICANT CHANGE UP (ref 3.8–10.5)
WBC # BLD: 8.43 K/UL — SIGNIFICANT CHANGE UP (ref 3.8–10.5)
WBC # BLD: 8.52 K/UL — SIGNIFICANT CHANGE UP (ref 3.8–10.5)
WBC # BLD: 8.57 K/UL — SIGNIFICANT CHANGE UP (ref 3.8–10.5)
WBC # BLD: 8.63 K/UL — SIGNIFICANT CHANGE UP (ref 3.8–10.5)
WBC # BLD: 8.68 K/UL — SIGNIFICANT CHANGE UP (ref 3.8–10.5)
WBC # BLD: 9.05 K/UL — SIGNIFICANT CHANGE UP (ref 3.8–10.5)
WBC # BLD: 9.2 K/UL — SIGNIFICANT CHANGE UP (ref 3.8–10.5)
WBC # BLD: 9.83 K/UL — SIGNIFICANT CHANGE UP (ref 3.8–10.5)
WBC # FLD AUTO: 10.66 K/UL — HIGH (ref 3.8–10.5)
WBC # FLD AUTO: 10.73 K/UL — HIGH (ref 3.8–10.5)
WBC # FLD AUTO: 11.06 K/UL — HIGH (ref 3.8–10.5)
WBC # FLD AUTO: 11.31 K/UL — HIGH (ref 3.8–10.5)
WBC # FLD AUTO: 11.46 K/UL — HIGH (ref 3.8–10.5)
WBC # FLD AUTO: 11.48 K/UL — HIGH (ref 3.8–10.5)
WBC # FLD AUTO: 12.19 K/UL — HIGH (ref 3.8–10.5)
WBC # FLD AUTO: 12.32 K/UL — HIGH (ref 3.8–10.5)
WBC # FLD AUTO: 12.33 K/UL — HIGH (ref 3.8–10.5)
WBC # FLD AUTO: 12.41 K/UL — HIGH (ref 3.8–10.5)
WBC # FLD AUTO: 12.47 K/UL — HIGH (ref 3.8–10.5)
WBC # FLD AUTO: 12.59 K/UL — HIGH (ref 3.8–10.5)
WBC # FLD AUTO: 12.79 K/UL — HIGH (ref 3.8–10.5)
WBC # FLD AUTO: 12.94 K/UL — HIGH (ref 3.8–10.5)
WBC # FLD AUTO: 13.09 K/UL — HIGH (ref 3.8–10.5)
WBC # FLD AUTO: 13.29 K/UL — HIGH (ref 3.8–10.5)
WBC # FLD AUTO: 13.93 K/UL — HIGH (ref 3.8–10.5)
WBC # FLD AUTO: 14.09 K/UL — HIGH (ref 3.8–10.5)
WBC # FLD AUTO: 14.19 K/UL — HIGH (ref 3.8–10.5)
WBC # FLD AUTO: 14.26 K/UL — HIGH (ref 3.8–10.5)
WBC # FLD AUTO: 14.4 K/UL — HIGH (ref 3.8–10.5)
WBC # FLD AUTO: 14.68 K/UL — HIGH (ref 3.8–10.5)
WBC # FLD AUTO: 14.8 K/UL — HIGH (ref 3.8–10.5)
WBC # FLD AUTO: 14.86 K/UL — HIGH (ref 3.8–10.5)
WBC # FLD AUTO: 15.02 K/UL — HIGH (ref 3.8–10.5)
WBC # FLD AUTO: 15.12 K/UL — HIGH (ref 3.8–10.5)
WBC # FLD AUTO: 15.15 K/UL — HIGH (ref 3.8–10.5)
WBC # FLD AUTO: 15.39 K/UL — HIGH (ref 3.8–10.5)
WBC # FLD AUTO: 15.46 K/UL — HIGH (ref 3.8–10.5)
WBC # FLD AUTO: 15.65 K/UL — HIGH (ref 3.8–10.5)
WBC # FLD AUTO: 15.69 K/UL — HIGH (ref 3.8–10.5)
WBC # FLD AUTO: 17.83 K/UL — HIGH (ref 3.8–10.5)
WBC # FLD AUTO: 2.76 K/UL — LOW (ref 3.8–10.5)
WBC # FLD AUTO: 21.43 K/UL — HIGH (ref 3.8–10.5)
WBC # FLD AUTO: 22.01 K/UL — HIGH (ref 3.8–10.5)
WBC # FLD AUTO: 22.66 K/UL — HIGH (ref 3.8–10.5)
WBC # FLD AUTO: 22.92 K/UL — HIGH (ref 3.8–10.5)
WBC # FLD AUTO: 27.15 K/UL — HIGH (ref 3.8–10.5)
WBC # FLD AUTO: 3.82 K/UL — SIGNIFICANT CHANGE UP (ref 3.8–10.5)
WBC # FLD AUTO: 4.44 K/UL — SIGNIFICANT CHANGE UP (ref 3.8–10.5)
WBC # FLD AUTO: 5.33 K/UL — SIGNIFICANT CHANGE UP (ref 3.8–10.5)
WBC # FLD AUTO: 6.24 K/UL — SIGNIFICANT CHANGE UP (ref 3.8–10.5)
WBC # FLD AUTO: 6.98 K/UL — SIGNIFICANT CHANGE UP (ref 3.8–10.5)
WBC # FLD AUTO: 7.21 K/UL — SIGNIFICANT CHANGE UP (ref 3.8–10.5)
WBC # FLD AUTO: 7.27 K/UL — SIGNIFICANT CHANGE UP (ref 3.8–10.5)
WBC # FLD AUTO: 7.29 K/UL — SIGNIFICANT CHANGE UP (ref 3.8–10.5)
WBC # FLD AUTO: 7.98 K/UL — SIGNIFICANT CHANGE UP (ref 3.8–10.5)
WBC # FLD AUTO: 8.43 K/UL — SIGNIFICANT CHANGE UP (ref 3.8–10.5)
WBC # FLD AUTO: 8.52 K/UL — SIGNIFICANT CHANGE UP (ref 3.8–10.5)
WBC # FLD AUTO: 8.57 K/UL — SIGNIFICANT CHANGE UP (ref 3.8–10.5)
WBC # FLD AUTO: 8.63 K/UL — SIGNIFICANT CHANGE UP (ref 3.8–10.5)
WBC # FLD AUTO: 8.68 K/UL — SIGNIFICANT CHANGE UP (ref 3.8–10.5)
WBC # FLD AUTO: 9.05 K/UL — SIGNIFICANT CHANGE UP (ref 3.8–10.5)
WBC # FLD AUTO: 9.2 K/UL — SIGNIFICANT CHANGE UP (ref 3.8–10.5)
WBC # FLD AUTO: 9.83 K/UL — SIGNIFICANT CHANGE UP (ref 3.8–10.5)
WBC UR QL: 1 /HPF — SIGNIFICANT CHANGE UP (ref 0–5)
WBC UR QL: 2 /HPF — SIGNIFICANT CHANGE UP (ref 0–5)
WBC UR QL: 2 /HPF — SIGNIFICANT CHANGE UP (ref 0–5)
WBC UR QL: 5 /HPF — SIGNIFICANT CHANGE UP (ref 0–5)

## 2024-01-01 PROCEDURE — 99233 SBSQ HOSP IP/OBS HIGH 50: CPT | Mod: FS

## 2024-01-01 PROCEDURE — 93005 ELECTROCARDIOGRAM TRACING: CPT

## 2024-01-01 PROCEDURE — 99232 SBSQ HOSP IP/OBS MODERATE 35: CPT

## 2024-01-01 PROCEDURE — 82962 GLUCOSE BLOOD TEST: CPT

## 2024-01-01 PROCEDURE — 99233 SBSQ HOSP IP/OBS HIGH 50: CPT

## 2024-01-01 PROCEDURE — 84439 ASSAY OF FREE THYROXINE: CPT

## 2024-01-01 PROCEDURE — 87507 IADNA-DNA/RNA PROBE TQ 12-25: CPT

## 2024-01-01 PROCEDURE — C9399: CPT

## 2024-01-01 PROCEDURE — 85610 PROTHROMBIN TIME: CPT

## 2024-01-01 PROCEDURE — 82728 ASSAY OF FERRITIN: CPT

## 2024-01-01 PROCEDURE — 0241U: CPT

## 2024-01-01 PROCEDURE — 87186 SC STD MICRODIL/AGAR DIL: CPT

## 2024-01-01 PROCEDURE — 87070 CULTURE OTHR SPECIMN AEROBIC: CPT

## 2024-01-01 PROCEDURE — 83735 ASSAY OF MAGNESIUM: CPT

## 2024-01-01 PROCEDURE — 86850 RBC ANTIBODY SCREEN: CPT

## 2024-01-01 PROCEDURE — 86900 BLOOD TYPING SEROLOGIC ABO: CPT

## 2024-01-01 PROCEDURE — 74177 CT ABD & PELVIS W/CONTRAST: CPT | Mod: 26,MC

## 2024-01-01 PROCEDURE — 94003 VENT MGMT INPAT SUBQ DAY: CPT

## 2024-01-01 PROCEDURE — 85730 THROMBOPLASTIN TIME PARTIAL: CPT

## 2024-01-01 PROCEDURE — 36430 TRANSFUSION BLD/BLD COMPNT: CPT

## 2024-01-01 PROCEDURE — 96365 THER/PROPH/DIAG IV INF INIT: CPT

## 2024-01-01 PROCEDURE — 71045 X-RAY EXAM CHEST 1 VIEW: CPT | Mod: 26

## 2024-01-01 PROCEDURE — 82330 ASSAY OF CALCIUM: CPT

## 2024-01-01 PROCEDURE — 74177 CT ABD & PELVIS W/CONTRAST: CPT | Mod: 26

## 2024-01-01 PROCEDURE — 87205 SMEAR GRAM STAIN: CPT

## 2024-01-01 PROCEDURE — 99285 EMERGENCY DEPT VISIT HI MDM: CPT | Mod: GC

## 2024-01-01 PROCEDURE — 84100 ASSAY OF PHOSPHORUS: CPT

## 2024-01-01 PROCEDURE — 85027 COMPLETE CBC AUTOMATED: CPT

## 2024-01-01 PROCEDURE — 99223 1ST HOSP IP/OBS HIGH 75: CPT

## 2024-01-01 PROCEDURE — 80053 COMPREHEN METABOLIC PANEL: CPT

## 2024-01-01 PROCEDURE — 85045 AUTOMATED RETICULOCYTE COUNT: CPT

## 2024-01-01 PROCEDURE — P9047: CPT

## 2024-01-01 PROCEDURE — 71260 CT THORAX DX C+: CPT | Mod: 26

## 2024-01-01 PROCEDURE — 99223 1ST HOSP IP/OBS HIGH 75: CPT | Mod: GC

## 2024-01-01 PROCEDURE — 99239 HOSP IP/OBS DSCHRG MGMT >30: CPT

## 2024-01-01 PROCEDURE — 80048 BASIC METABOLIC PNL TOTAL CA: CPT

## 2024-01-01 PROCEDURE — 84443 ASSAY THYROID STIM HORMONE: CPT

## 2024-01-01 PROCEDURE — 99497 ADVNCD CARE PLAN 30 MIN: CPT | Mod: 25

## 2024-01-01 PROCEDURE — 87077 CULTURE AEROBIC IDENTIFY: CPT

## 2024-01-01 PROCEDURE — 87635 SARS-COV-2 COVID-19 AMP PRB: CPT

## 2024-01-01 PROCEDURE — C1769: CPT

## 2024-01-01 PROCEDURE — 86077 PHYS BLOOD BANK SERV XMATCH: CPT

## 2024-01-01 PROCEDURE — 96375 TX/PRO/DX INJ NEW DRUG ADDON: CPT

## 2024-01-01 PROCEDURE — C1889: CPT

## 2024-01-01 PROCEDURE — 96366 THER/PROPH/DIAG IV INF ADDON: CPT

## 2024-01-01 PROCEDURE — 87040 BLOOD CULTURE FOR BACTERIA: CPT

## 2024-01-01 PROCEDURE — 82140 ASSAY OF AMMONIA: CPT

## 2024-01-01 PROCEDURE — 76604 US EXAM CHEST: CPT | Mod: 26

## 2024-01-01 PROCEDURE — 80202 ASSAY OF VANCOMYCIN: CPT

## 2024-01-01 PROCEDURE — 83550 IRON BINDING TEST: CPT

## 2024-01-01 PROCEDURE — 76937 US GUIDE VASCULAR ACCESS: CPT | Mod: 26

## 2024-01-01 PROCEDURE — 49465 FLUORO EXAM OF G/COLON TUBE: CPT

## 2024-01-01 PROCEDURE — 86922 COMPATIBILITY TEST ANTIGLOB: CPT

## 2024-01-01 PROCEDURE — 84295 ASSAY OF SERUM SODIUM: CPT

## 2024-01-01 PROCEDURE — 86902 BLOOD TYPE ANTIGEN DONOR EA: CPT

## 2024-01-01 PROCEDURE — 87086 URINE CULTURE/COLONY COUNT: CPT

## 2024-01-01 PROCEDURE — P9040: CPT

## 2024-01-01 PROCEDURE — 86870 RBC ANTIBODY IDENTIFICATION: CPT

## 2024-01-01 PROCEDURE — 99221 1ST HOSP IP/OBS SF/LOW 40: CPT

## 2024-01-01 PROCEDURE — 94799 UNLISTED PULMONARY SVC/PX: CPT

## 2024-01-01 PROCEDURE — 86880 COOMBS TEST DIRECT: CPT

## 2024-01-01 PROCEDURE — 71045 X-RAY EXAM CHEST 1 VIEW: CPT | Mod: 26,77

## 2024-01-01 PROCEDURE — 74018 RADEX ABDOMEN 1 VIEW: CPT | Mod: 26

## 2024-01-01 PROCEDURE — 83605 ASSAY OF LACTIC ACID: CPT

## 2024-01-01 PROCEDURE — 84145 PROCALCITONIN (PCT): CPT

## 2024-01-01 PROCEDURE — 84132 ASSAY OF SERUM POTASSIUM: CPT

## 2024-01-01 PROCEDURE — 83036 HEMOGLOBIN GLYCOSYLATED A1C: CPT

## 2024-01-01 PROCEDURE — 87449 NOS EACH ORGANISM AG IA: CPT

## 2024-01-01 PROCEDURE — 97162 PT EVAL MOD COMPLEX 30 MIN: CPT

## 2024-01-01 PROCEDURE — 99498 ADVNCD CARE PLAN ADDL 30 MIN: CPT | Mod: 25

## 2024-01-01 PROCEDURE — 85018 HEMOGLOBIN: CPT

## 2024-01-01 PROCEDURE — 82435 ASSAY OF BLOOD CHLORIDE: CPT

## 2024-01-01 PROCEDURE — 99231 SBSQ HOSP IP/OBS SF/LOW 25: CPT

## 2024-01-01 PROCEDURE — 99358 PROLONG SERVICE W/O CONTACT: CPT | Mod: NC

## 2024-01-01 PROCEDURE — 82272 OCCULT BLD FECES 1-3 TESTS: CPT

## 2024-01-01 PROCEDURE — 87324 CLOSTRIDIUM AG IA: CPT

## 2024-01-01 PROCEDURE — 99285 EMERGENCY DEPT VISIT HI MDM: CPT | Mod: 25

## 2024-01-01 PROCEDURE — 82947 ASSAY GLUCOSE BLOOD QUANT: CPT

## 2024-01-01 PROCEDURE — 87640 STAPH A DNA AMP PROBE: CPT

## 2024-01-01 PROCEDURE — 86860 RBC ANTIBODY ELUTION: CPT

## 2024-01-01 PROCEDURE — 86901 BLOOD TYPING SEROLOGIC RH(D): CPT

## 2024-01-01 PROCEDURE — 70487 CT MAXILLOFACIAL W/DYE: CPT | Mod: MC

## 2024-01-01 PROCEDURE — 87641 MR-STAPH DNA AMP PROBE: CPT

## 2024-01-01 PROCEDURE — 71260 CT THORAX DX C+: CPT | Mod: MC

## 2024-01-01 PROCEDURE — L8699: CPT

## 2024-01-01 PROCEDURE — 70487 CT MAXILLOFACIAL W/DYE: CPT | Mod: 26

## 2024-01-01 PROCEDURE — 71045 X-RAY EXAM CHEST 1 VIEW: CPT

## 2024-01-01 PROCEDURE — 99232 SBSQ HOSP IP/OBS MODERATE 35: CPT | Mod: GC

## 2024-01-01 PROCEDURE — 99233 SBSQ HOSP IP/OBS HIGH 50: CPT | Mod: 25

## 2024-01-01 PROCEDURE — 94640 AIRWAY INHALATION TREATMENT: CPT

## 2024-01-01 PROCEDURE — 94002 VENT MGMT INPAT INIT DAY: CPT

## 2024-01-01 PROCEDURE — 85025 COMPLETE CBC W/AUTO DIFF WBC: CPT

## 2024-01-01 PROCEDURE — 82247 BILIRUBIN TOTAL: CPT

## 2024-01-01 PROCEDURE — 74177 CT ABD & PELVIS W/CONTRAST: CPT | Mod: MC

## 2024-01-01 PROCEDURE — 85014 HEMATOCRIT: CPT

## 2024-01-01 PROCEDURE — 74018 RADEX ABDOMEN 1 VIEW: CPT

## 2024-01-01 PROCEDURE — 83010 ASSAY OF HAPTOGLOBIN QUANT: CPT

## 2024-01-01 PROCEDURE — 82803 BLOOD GASES ANY COMBINATION: CPT

## 2024-01-01 PROCEDURE — 93970 EXTREMITY STUDY: CPT | Mod: 26

## 2024-01-01 PROCEDURE — 81001 URINALYSIS AUTO W/SCOPE: CPT

## 2024-01-01 PROCEDURE — 87150 DNA/RNA AMPLIFIED PROBE: CPT

## 2024-01-01 PROCEDURE — 82565 ASSAY OF CREATININE: CPT

## 2024-01-01 PROCEDURE — 93970 EXTREMITY STUDY: CPT

## 2024-01-01 PROCEDURE — 84484 ASSAY OF TROPONIN QUANT: CPT

## 2024-01-01 PROCEDURE — 99221 1ST HOSP IP/OBS SF/LOW 40: CPT | Mod: 25

## 2024-01-01 PROCEDURE — 83540 ASSAY OF IRON: CPT

## 2024-01-01 PROCEDURE — 36600 WITHDRAWAL OF ARTERIAL BLOOD: CPT

## 2024-01-01 PROCEDURE — 81003 URINALYSIS AUTO W/O SCOPE: CPT

## 2024-01-01 PROCEDURE — 70450 CT HEAD/BRAIN W/O DYE: CPT | Mod: 26

## 2024-01-01 PROCEDURE — 96374 THER/PROPH/DIAG INJ IV PUSH: CPT

## 2024-01-01 PROCEDURE — 97760 ORTHOTIC MGMT&TRAING 1ST ENC: CPT

## 2024-01-01 PROCEDURE — 70450 CT HEAD/BRAIN W/O DYE: CPT | Mod: MC

## 2024-01-01 PROCEDURE — 87637 SARSCOV2&INF A&B&RSV AMP PRB: CPT

## 2024-01-01 PROCEDURE — 93010 ELECTROCARDIOGRAM REPORT: CPT

## 2024-01-01 PROCEDURE — 31615 TRCHEOBRNCHSC EST TRACHS INC: CPT

## 2024-01-01 PROCEDURE — 36415 COLL VENOUS BLD VENIPUNCTURE: CPT

## 2024-01-01 PROCEDURE — 82746 ASSAY OF FOLIC ACID SERUM: CPT

## 2024-01-01 PROCEDURE — 76937 US GUIDE VASCULAR ACCESS: CPT

## 2024-01-01 PROCEDURE — 83615 LACTATE (LD) (LDH) ENZYME: CPT

## 2024-01-01 PROCEDURE — 82607 VITAMIN B-12: CPT

## 2024-01-01 PROCEDURE — 83880 ASSAY OF NATRIURETIC PEPTIDE: CPT

## 2024-01-01 DEVICE — CLIP MANTIS 235CMX2.8MM: Type: IMPLANTABLE DEVICE | Status: FUNCTIONAL

## 2024-01-01 DEVICE — CLIP HEMOSTASIS DURACLIP 16MM: Type: IMPLANTABLE DEVICE | Status: FUNCTIONAL

## 2024-01-01 DEVICE — CLIP HEMO INSTINCT PLUS ENDOSCOPIC: Type: IMPLANTABLE DEVICE | Status: FUNCTIONAL

## 2024-01-01 DEVICE — IMPLANTABLE DEVICE: Type: IMPLANTABLE DEVICE | Status: FUNCTIONAL

## 2024-01-01 DEVICE — GWIRE VISIGLIDE STRT TIP 270CM .035: Type: IMPLANTABLE DEVICE | Status: FUNCTIONAL

## 2024-01-01 DEVICE — CLIP RESOLUTION 360 235CM: Type: IMPLANTABLE DEVICE | Status: FUNCTIONAL

## 2024-01-01 RX ORDER — VANCOMYCIN HCL 900 MCG/MG
125 POWDER (GRAM) MISCELLANEOUS EVERY 6 HOURS
Refills: 0 | Status: DISCONTINUED | OUTPATIENT
Start: 2024-01-01 | End: 2024-01-01

## 2024-01-01 RX ORDER — 0.9 % SODIUM CHLORIDE 0.9 %
1000 INTRAVENOUS SOLUTION INTRAVENOUS
Refills: 0 | Status: DISCONTINUED | OUTPATIENT
Start: 2024-01-01 | End: 2024-01-01

## 2024-01-01 RX ORDER — INSULIN REG, HUM S-S BUFF 100/ML
18 VIAL (ML) INJECTION
Refills: 0 | Status: DISCONTINUED | OUTPATIENT
Start: 2024-01-01 | End: 2024-01-01

## 2024-01-01 RX ORDER — POTASSIUM CHLORIDE 600 MG/1
10 TABLET, EXTENDED RELEASE ORAL
Refills: 0 | Status: COMPLETED | OUTPATIENT
Start: 2024-01-01 | End: 2024-01-01

## 2024-01-01 RX ORDER — INSULIN GLARGINE 100 [IU]/ML
12 INJECTION, SOLUTION SUBCUTANEOUS
Refills: 0 | Status: DISCONTINUED | OUTPATIENT
Start: 2024-01-01 | End: 2024-01-01

## 2024-01-01 RX ORDER — OXYCODONE HYDROCHLORIDE 30 MG/1
2.5 TABLET ORAL EVERY 6 HOURS
Refills: 0 | Status: DISCONTINUED | OUTPATIENT
Start: 2024-01-01 | End: 2024-01-01

## 2024-01-01 RX ORDER — ACETAMINOPHEN 500MG 500 MG/1
650 TABLET, COATED ORAL EVERY 6 HOURS
Refills: 0 | Status: DISCONTINUED | OUTPATIENT
Start: 2024-01-01 | End: 2024-01-01

## 2024-01-01 RX ORDER — CHLORHEXIDINE GLUCONATE 1.2 MG/ML
15 RINSE ORAL EVERY 12 HOURS
Refills: 0 | Status: DISCONTINUED | OUTPATIENT
Start: 2024-01-01 | End: 2024-01-01

## 2024-01-01 RX ORDER — AMLODIPINE BESYLATE 10 MG/1
10 TABLET ORAL DAILY
Refills: 0 | Status: DISCONTINUED | OUTPATIENT
Start: 2024-01-01 | End: 2024-01-01

## 2024-01-01 RX ORDER — SODIUM,POTASSIUM PHOSPHATES 278-250MG
2 POWDER IN PACKET (EA) ORAL ONCE
Refills: 0 | Status: COMPLETED | OUTPATIENT
Start: 2024-01-01 | End: 2024-01-01

## 2024-01-01 RX ORDER — INSULIN GLARGINE 100 [IU]/ML
15 INJECTION, SOLUTION SUBCUTANEOUS ONCE
Refills: 0 | Status: COMPLETED | OUTPATIENT
Start: 2024-01-01 | End: 2024-01-01

## 2024-01-01 RX ORDER — FENTANYL 12 UG/H
1 PATCH, EXTENDED RELEASE TRANSDERMAL
Refills: 0 | Status: DISCONTINUED | OUTPATIENT
Start: 2024-01-01 | End: 2024-01-01

## 2024-01-01 RX ORDER — INSULIN REG, HUM S-S BUFF 100/ML
10 VIAL (ML) INJECTION ONCE
Refills: 0 | Status: COMPLETED | OUTPATIENT
Start: 2024-01-01 | End: 2024-01-01

## 2024-01-01 RX ORDER — POTASSIUM PHOSPHATE, MONOBASIC POTASSIUM PHOSPHATE, DIBASIC INJECTION, 236; 224 MG/ML; MG/ML
15 SOLUTION, CONCENTRATE INTRAVENOUS ONCE
Refills: 0 | Status: COMPLETED | OUTPATIENT
Start: 2024-01-01 | End: 2024-01-01

## 2024-01-01 RX ORDER — INSULIN GLARGINE 100 [IU]/ML
15 INJECTION, SOLUTION SUBCUTANEOUS AT BEDTIME
Refills: 0 | Status: DISCONTINUED | OUTPATIENT
Start: 2024-01-01 | End: 2024-01-01

## 2024-01-01 RX ORDER — MULTIVIT WITH MINERALS/LUTEIN
500 TABLET ORAL DAILY
Refills: 0 | Status: DISCONTINUED | OUTPATIENT
Start: 2024-01-01 | End: 2024-01-01

## 2024-01-01 RX ORDER — GLUCAGON INJECTION, SOLUTION 0.5 MG/.1ML
1 INJECTION, SOLUTION SUBCUTANEOUS ONCE
Refills: 0 | Status: DISCONTINUED | OUTPATIENT
Start: 2024-01-01 | End: 2024-01-01

## 2024-01-01 RX ORDER — FUROSEMIDE 40 MG/1
20 TABLET ORAL ONCE
Refills: 0 | Status: COMPLETED | OUTPATIENT
Start: 2024-01-01 | End: 2024-01-01

## 2024-01-01 RX ORDER — ACETAMINOPHEN 500MG 500 MG/1
1000 TABLET, COATED ORAL ONCE
Refills: 0 | Status: COMPLETED | OUTPATIENT
Start: 2024-01-01 | End: 2024-01-01

## 2024-01-01 RX ORDER — OXYCODONE HYDROCHLORIDE 30 MG/1
5 TABLET ORAL EVERY 6 HOURS
Refills: 0 | Status: DISCONTINUED | OUTPATIENT
Start: 2024-01-01 | End: 2024-01-01

## 2024-01-01 RX ORDER — DIPHENHYDRAMINE HCL 25 MG
12.5 CAPSULE ORAL ONCE
Refills: 0 | Status: COMPLETED | OUTPATIENT
Start: 2024-01-01 | End: 2024-01-01

## 2024-01-01 RX ORDER — PE/SHARK LIVER OIL/GLYC/WH.PET
1 CREAM (GRAM) RECTAL AT BEDTIME
Refills: 0 | Status: DISCONTINUED | OUTPATIENT
Start: 2024-01-01 | End: 2024-01-01

## 2024-01-01 RX ORDER — IPRATROPIUM BROMIDE AND ALBUTEROL SULFATE .5; 2.5 MG/3ML; MG/3ML
3 SOLUTION RESPIRATORY (INHALATION) ONCE
Refills: 0 | Status: COMPLETED | OUTPATIENT
Start: 2024-01-01 | End: 2024-01-01

## 2024-01-01 RX ORDER — POTASSIUM CHLORIDE 600 MG/1
40 TABLET, EXTENDED RELEASE ORAL EVERY 4 HOURS
Refills: 0 | Status: COMPLETED | OUTPATIENT
Start: 2024-01-01 | End: 2024-01-01

## 2024-01-01 RX ORDER — PREDNISONE 20 MG/1
5 TABLET ORAL ONCE
Refills: 0 | Status: COMPLETED | OUTPATIENT
Start: 2024-01-01 | End: 2024-01-01

## 2024-01-01 RX ORDER — INSULIN GLARGINE 100 [IU]/ML
20 INJECTION, SOLUTION SUBCUTANEOUS AT BEDTIME
Refills: 0 | Status: DISCONTINUED | OUTPATIENT
Start: 2024-01-01 | End: 2024-01-01

## 2024-01-01 RX ORDER — GLYCOPYRROLATE 1 MG/1
0.4 TABLET ORAL
Refills: 0 | Status: DISCONTINUED | OUTPATIENT
Start: 2024-01-01 | End: 2024-01-01

## 2024-01-01 RX ORDER — LEVOTHYROXINE SODIUM 150 MCG
87.5 TABLET ORAL ONCE
Refills: 0 | Status: COMPLETED | OUTPATIENT
Start: 2024-01-01 | End: 2024-01-01

## 2024-01-01 RX ORDER — INSULIN GLARGINE 100 [IU]/ML
5 INJECTION, SOLUTION SUBCUTANEOUS
Refills: 0 | Status: DISCONTINUED | OUTPATIENT
Start: 2024-01-01 | End: 2024-01-01

## 2024-01-01 RX ORDER — ACETYLCYSTEINE
4 POWDER (GRAM) MISCELLANEOUS EVERY 12 HOURS
Refills: 0 | Status: DISCONTINUED | OUTPATIENT
Start: 2024-01-01 | End: 2024-01-01

## 2024-01-01 RX ORDER — AMLODIPINE BESYLATE 10 MG/1
10 TABLET ORAL
Refills: 0 | Status: DISCONTINUED | OUTPATIENT
Start: 2024-01-01 | End: 2024-01-01

## 2024-01-01 RX ORDER — INSULIN GLARGINE 100 [IU]/ML
24 INJECTION, SOLUTION SUBCUTANEOUS AT BEDTIME
Refills: 0 | Status: DISCONTINUED | OUTPATIENT
Start: 2024-01-01 | End: 2024-01-01

## 2024-01-01 RX ORDER — VANCOMYCIN HCL 900 MCG/MG
125 POWDER (GRAM) MISCELLANEOUS EVERY 6 HOURS
Refills: 0 | Status: COMPLETED | OUTPATIENT
Start: 2024-01-01 | End: 2024-01-01

## 2024-01-01 RX ORDER — SODIUM BICARBONATE 84 MG/ML
50 INJECTION, SOLUTION INTRAVENOUS ONCE
Refills: 0 | Status: COMPLETED | OUTPATIENT
Start: 2024-01-01 | End: 2024-01-01

## 2024-01-01 RX ORDER — VANCOMYCIN HCL 900 MCG/MG
750 POWDER (GRAM) MISCELLANEOUS EVERY 12 HOURS
Refills: 0 | Status: DISCONTINUED | OUTPATIENT
Start: 2024-01-01 | End: 2024-01-01

## 2024-01-01 RX ORDER — DIPHENHYDRAMINE HCL 25 MG
12.5 CAPSULE ORAL EVERY 6 HOURS
Refills: 0 | Status: DISCONTINUED | OUTPATIENT
Start: 2024-01-01 | End: 2024-01-01

## 2024-01-01 RX ORDER — CEFEPIME 2 G/1
2000 INJECTION, POWDER, FOR SOLUTION INTRAVENOUS EVERY 8 HOURS
Refills: 0 | Status: COMPLETED | OUTPATIENT
Start: 2024-01-01 | End: 2024-01-01

## 2024-01-01 RX ORDER — ACETAMINOPHEN 500 MG/5ML
1000 LIQUID (ML) ORAL ONCE
Refills: 0 | Status: COMPLETED | OUTPATIENT
Start: 2024-01-01 | End: 2024-01-01

## 2024-01-01 RX ORDER — GLYCOPYRROLATE 1 MG/1
1 TABLET ORAL EVERY 4 HOURS
Refills: 0 | Status: DISCONTINUED | OUTPATIENT
Start: 2024-01-01 | End: 2024-01-01

## 2024-01-01 RX ORDER — LIPASE/PROTEASE/AMYLASE 8K-30K-30K
1 TABLET ORAL ONCE
Refills: 0 | Status: COMPLETED | OUTPATIENT
Start: 2024-01-01 | End: 2024-01-01

## 2024-01-01 RX ORDER — HYDROMORPHONE HYDROCHLORIDE 2 MG/1
1 TABLET ORAL
Refills: 0 | Status: DISCONTINUED | OUTPATIENT
Start: 2024-01-01 | End: 2024-01-01

## 2024-01-01 RX ORDER — LEVOTHYROXINE SODIUM 300 MCG
125 TABLET ORAL ONCE
Refills: 0 | Status: COMPLETED | OUTPATIENT
Start: 2024-01-01 | End: 2024-01-01

## 2024-01-01 RX ORDER — INSULIN GLARGINE 100 [IU]/ML
14 INJECTION, SOLUTION SUBCUTANEOUS
Refills: 0 | Status: DISCONTINUED | OUTPATIENT
Start: 2024-01-01 | End: 2024-01-01

## 2024-01-01 RX ORDER — SODIUM CHLORIDE 9 MG/ML
1 INJECTION, SOLUTION INTRAMUSCULAR; INTRAVENOUS; SUBCUTANEOUS EVERY 8 HOURS
Refills: 0 | Status: DISCONTINUED | OUTPATIENT
Start: 2024-01-01 | End: 2024-01-01

## 2024-01-01 RX ORDER — DICLOFENAC SODIUM 20 MG/G
2 SOLUTION TOPICAL EVERY 6 HOURS
Refills: 0 | Status: DISCONTINUED | OUTPATIENT
Start: 2024-01-01 | End: 2024-01-01

## 2024-01-01 RX ORDER — SODIUM CHLORIDE 9 MG/ML
1000 INJECTION, SOLUTION INTRAMUSCULAR; INTRAVENOUS; SUBCUTANEOUS ONCE
Refills: 0 | Status: COMPLETED | OUTPATIENT
Start: 2024-01-01 | End: 2024-01-01

## 2024-01-01 RX ORDER — FENTANYL CITRATE-0.9 % NACL/PF 100MCG/2ML
1 SYRINGE (ML) INTRAVENOUS ONCE
Refills: 0 | Status: DISCONTINUED | OUTPATIENT
Start: 2024-01-01 | End: 2024-01-01

## 2024-01-01 RX ORDER — HYDRALAZINE HYDROCHLORIDE 10 MG/1
10 TABLET ORAL ONCE
Refills: 0 | Status: COMPLETED | OUTPATIENT
Start: 2024-01-01 | End: 2024-01-01

## 2024-01-01 RX ORDER — NYSTATIN 100000 [USP'U]/G
1 POWDER TOPICAL EVERY 8 HOURS
Refills: 0 | Status: DISCONTINUED | OUTPATIENT
Start: 2024-01-01 | End: 2024-01-01

## 2024-01-01 RX ORDER — PREDNISOLONE ACETATE 1.2 MG/ML
1 SUSPENSION/ DROPS OPHTHALMIC EVERY 6 HOURS
Refills: 0 | Status: DISCONTINUED | OUTPATIENT
Start: 2024-01-01 | End: 2024-01-01

## 2024-01-01 RX ORDER — WITCH HAZEL 50 G/100ML
1 SOLUTION RECTAL EVERY 12 HOURS
Refills: 0 | Status: DISCONTINUED | OUTPATIENT
Start: 2024-01-01 | End: 2024-01-01

## 2024-01-01 RX ORDER — LEVOTHYROXINE SODIUM 150 MCG
125 TABLET ORAL
Refills: 0 | Status: DISCONTINUED | OUTPATIENT
Start: 2024-01-01 | End: 2024-01-01

## 2024-01-01 RX ORDER — CEFEPIME 2 G/1
2000 INJECTION, POWDER, FOR SOLUTION INTRAVENOUS EVERY 8 HOURS
Refills: 0 | Status: DISCONTINUED | OUTPATIENT
Start: 2024-01-01 | End: 2024-01-01

## 2024-01-01 RX ORDER — SODIUM CHLORIDE 9 MG/ML
500 INJECTION, SOLUTION INTRAMUSCULAR; INTRAVENOUS; SUBCUTANEOUS ONCE
Refills: 0 | Status: DISCONTINUED | OUTPATIENT
Start: 2024-01-01 | End: 2024-01-01

## 2024-01-01 RX ORDER — ESCITALOPRAM OXALATE 10 MG/1
10 TABLET, FILM COATED ORAL ONCE
Refills: 0 | Status: DISCONTINUED | OUTPATIENT
Start: 2024-01-01 | End: 2024-01-01

## 2024-01-01 RX ORDER — INSULIN REG, HUM S-S BUFF 100/ML
4 VIAL (ML) INJECTION ONCE
Refills: 0 | Status: COMPLETED | OUTPATIENT
Start: 2024-01-01 | End: 2024-01-01

## 2024-01-01 RX ORDER — MEROPENEM 500 MG/1
1 INJECTION, POWDER, FOR SOLUTION INTRAVENOUS EVERY 8 HOURS
Refills: 0 | Status: DISCONTINUED | OUTPATIENT
Start: 2024-01-01 | End: 2024-01-01

## 2024-01-01 RX ORDER — PETROLATUM 960 MG/G
1 OINTMENT TOPICAL ONCE
Refills: 0 | Status: DISCONTINUED | OUTPATIENT
Start: 2024-01-01 | End: 2024-01-01

## 2024-01-01 RX ORDER — INSULIN GLARGINE 100 [IU]/ML
10 INJECTION, SOLUTION SUBCUTANEOUS ONCE
Refills: 0 | Status: COMPLETED | OUTPATIENT
Start: 2024-01-01 | End: 2024-01-01

## 2024-01-01 RX ORDER — LYSINE HCL 500 MG
1 TABLET ORAL
Refills: 0 | Status: DISCONTINUED | OUTPATIENT
Start: 2024-01-01 | End: 2024-01-01

## 2024-01-01 RX ORDER — HYDROMORPHONE HYDROCHLORIDE 2 MG/1
0.5 TABLET ORAL
Refills: 0 | Status: DISCONTINUED | OUTPATIENT
Start: 2024-01-01 | End: 2024-01-01

## 2024-01-01 RX ORDER — HYDROMORPHONE HYDROCHLORIDE 2 MG/1
0.2 TABLET ORAL ONCE
Refills: 0 | Status: DISCONTINUED | OUTPATIENT
Start: 2024-01-01 | End: 2024-01-01

## 2024-01-01 RX ORDER — LEVOTHYROXINE SODIUM 150 MCG
125 TABLET ORAL DAILY
Refills: 0 | Status: DISCONTINUED | OUTPATIENT
Start: 2024-01-01 | End: 2024-01-01

## 2024-01-01 RX ORDER — ACETAMINOPHEN 500MG 500 MG/1
500 TABLET, COATED ORAL EVERY 12 HOURS
Refills: 0 | Status: DISCONTINUED | OUTPATIENT
Start: 2024-01-01 | End: 2024-01-01

## 2024-01-01 RX ORDER — IPRATROPIUM BROMIDE AND ALBUTEROL SULFATE 2.5; .5 MG/3ML; MG/3ML
3 SOLUTION RESPIRATORY (INHALATION) EVERY 6 HOURS
Refills: 0 | Status: DISCONTINUED | OUTPATIENT
Start: 2024-01-01 | End: 2024-01-01

## 2024-01-01 RX ORDER — ACETAMINOPHEN 500MG 500 MG/1
1000 TABLET, COATED ORAL EVERY 8 HOURS
Refills: 0 | Status: DISCONTINUED | OUTPATIENT
Start: 2024-01-01 | End: 2024-01-01

## 2024-01-01 RX ORDER — MIDODRINE HYDROCHLORIDE 5 MG/1
10 TABLET ORAL ONCE
Refills: 0 | Status: COMPLETED | OUTPATIENT
Start: 2024-01-01 | End: 2024-01-01

## 2024-01-01 RX ORDER — METRONIDAZOLE 500 MG/1
500 TABLET ORAL EVERY 8 HOURS
Refills: 0 | Status: DISCONTINUED | OUTPATIENT
Start: 2024-01-01 | End: 2024-01-01

## 2024-01-01 RX ORDER — SODIUM,POTASSIUM PHOSPHATES 278-250MG
2 POWDER IN PACKET (EA) ORAL EVERY 6 HOURS
Refills: 0 | Status: COMPLETED | OUTPATIENT
Start: 2024-01-01 | End: 2024-01-01

## 2024-01-01 RX ORDER — PANTOPRAZOLE SODIUM 40 MG/1
40 TABLET, DELAYED RELEASE ORAL ONCE
Refills: 0 | Status: COMPLETED | OUTPATIENT
Start: 2024-01-01 | End: 2024-01-01

## 2024-01-01 RX ORDER — POVIDONE, POLYVINYL ALCOHOL 20; 27 G/1000ML; G/1000ML
1 SOLUTION OPHTHALMIC EVERY 6 HOURS
Refills: 0 | Status: DISCONTINUED | OUTPATIENT
Start: 2024-01-01 | End: 2024-01-01

## 2024-01-01 RX ORDER — MEROPENEM 500 MG/1
1000 INJECTION, POWDER, FOR SOLUTION INTRAVENOUS EVERY 8 HOURS
Refills: 0 | Status: DISCONTINUED | OUTPATIENT
Start: 2024-01-01 | End: 2024-01-01

## 2024-01-01 RX ORDER — INSULIN REG, HUM S-S BUFF 100/ML
14 VIAL (ML) INJECTION
Refills: 0 | Status: DISCONTINUED | OUTPATIENT
Start: 2024-01-01 | End: 2024-01-01

## 2024-01-01 RX ORDER — POTASSIUM CHLORIDE 600 MG/1
40 TABLET, EXTENDED RELEASE ORAL ONCE
Refills: 0 | Status: COMPLETED | OUTPATIENT
Start: 2024-01-01 | End: 2024-01-01

## 2024-01-01 RX ORDER — PANTOPRAZOLE SODIUM 40 MG/1
40 TABLET, DELAYED RELEASE ORAL EVERY 12 HOURS
Refills: 0 | Status: DISCONTINUED | OUTPATIENT
Start: 2024-01-01 | End: 2024-01-01

## 2024-01-01 RX ORDER — CEFTRIAXONE SODIUM 1 G
1000 VIAL (EA) INJECTION ONCE
Refills: 0 | Status: COMPLETED | OUTPATIENT
Start: 2024-01-01 | End: 2024-01-01

## 2024-01-01 RX ORDER — POTASSIUM CHLORIDE 600 MG/1
10 TABLET, EXTENDED RELEASE ORAL
Refills: 0 | Status: DISCONTINUED | OUTPATIENT
Start: 2024-01-01 | End: 2024-01-01

## 2024-01-01 RX ORDER — SODIUM CHLORIDE 9 MG/ML
500 INJECTION, SOLUTION INTRAMUSCULAR; INTRAVENOUS; SUBCUTANEOUS
Refills: 0 | Status: DISCONTINUED | OUTPATIENT
Start: 2024-01-01 | End: 2024-01-01

## 2024-01-01 RX ORDER — INSULIN GLARGINE 100 [IU]/ML
25 INJECTION, SOLUTION SUBCUTANEOUS
Refills: 0 | Status: DISCONTINUED | OUTPATIENT
Start: 2024-01-01 | End: 2024-01-01

## 2024-01-01 RX ORDER — MEROPENEM 500 MG/1
1000 INJECTION, POWDER, FOR SOLUTION INTRAVENOUS EVERY 8 HOURS
Refills: 0 | Status: COMPLETED | OUTPATIENT
Start: 2024-01-01 | End: 2024-01-01

## 2024-01-01 RX ORDER — CALCIUM CARBONATE 500(1250)
1 TABLET ORAL
Refills: 0 | Status: DISCONTINUED | OUTPATIENT
Start: 2024-01-01 | End: 2024-01-01

## 2024-01-01 RX ORDER — SODIUM CHLORIDE 9 MG/ML
1 INJECTION, SOLUTION INTRAMUSCULAR; INTRAVENOUS; SUBCUTANEOUS EVERY 12 HOURS
Refills: 0 | Status: DISCONTINUED | OUTPATIENT
Start: 2024-01-01 | End: 2024-01-01

## 2024-01-01 RX ORDER — MIDODRINE HYDROCHLORIDE 5 MG/1
10 TABLET ORAL EVERY 8 HOURS
Refills: 0 | Status: DISCONTINUED | OUTPATIENT
Start: 2024-01-01 | End: 2024-01-01

## 2024-01-01 RX ORDER — INSULIN GLARGINE 100 [IU]/ML
3 INJECTION, SOLUTION SUBCUTANEOUS ONCE
Refills: 0 | Status: COMPLETED | OUTPATIENT
Start: 2024-01-01 | End: 2024-01-01

## 2024-01-01 RX ORDER — HYDRALAZINE HYDROCHLORIDE 10 MG/1
5 TABLET ORAL ONCE
Refills: 0 | Status: COMPLETED | OUTPATIENT
Start: 2024-01-01 | End: 2024-01-01

## 2024-01-01 RX ORDER — VANCOMYCIN HCL 900 MCG/MG
125 POWDER (GRAM) MISCELLANEOUS ONCE
Refills: 0 | Status: COMPLETED | OUTPATIENT
Start: 2024-01-01 | End: 2024-01-01

## 2024-01-01 RX ORDER — ACETAMINOPHEN, DIPHENHYDRAMINE HCL, PHENYLEPHRINE HCL 325; 25; 5 MG/1; MG/1; MG/1
12 TABLET ORAL
Refills: 0 | Status: DISCONTINUED | OUTPATIENT
Start: 2024-01-01 | End: 2024-01-01

## 2024-01-01 RX ORDER — AMLODIPINE BESYLATE 10 MG/1
10 TABLET ORAL ONCE
Refills: 0 | Status: COMPLETED | OUTPATIENT
Start: 2024-01-01 | End: 2024-01-01

## 2024-01-01 RX ORDER — HYDRALAZINE HYDROCHLORIDE 10 MG/1
10 TABLET ORAL EVERY 8 HOURS
Refills: 0 | Status: DISCONTINUED | OUTPATIENT
Start: 2024-01-01 | End: 2024-01-01

## 2024-01-01 RX ORDER — IPRATROPIUM BROMIDE AND ALBUTEROL SULFATE 2.5; .5 MG/3ML; MG/3ML
3 SOLUTION RESPIRATORY (INHALATION) ONCE
Refills: 0 | Status: COMPLETED | OUTPATIENT
Start: 2024-01-01 | End: 2024-01-01

## 2024-01-01 RX ORDER — CEFEPIME 2 G/1
1000 INJECTION, POWDER, FOR SOLUTION INTRAVENOUS EVERY 12 HOURS
Refills: 0 | Status: DISCONTINUED | OUTPATIENT
Start: 2024-01-01 | End: 2024-01-01

## 2024-01-01 RX ORDER — DIPHENHYDRAMINE HCL 25 MG
12.5 CAPSULE ORAL
Refills: 0 | Status: DISCONTINUED | OUTPATIENT
Start: 2024-01-01 | End: 2024-01-01

## 2024-01-01 RX ORDER — DIPHENHYDRAMINE HYDROCHLORIDE AND LIDOCAINE HYDROCHLORIDE AND ALUMINUM HYDROXIDE AND MAGNESIUM HYDRO
5 KIT EVERY 8 HOURS
Refills: 0 | Status: DISCONTINUED | OUTPATIENT
Start: 2024-01-01 | End: 2024-01-01

## 2024-01-01 RX ORDER — OXYCODONE HYDROCHLORIDE 30 MG/1
10 TABLET ORAL EVERY 6 HOURS
Refills: 0 | Status: DISCONTINUED | OUTPATIENT
Start: 2024-01-01 | End: 2024-01-01

## 2024-01-01 RX ORDER — SODIUM CHLORIDE 0.65 %
1 AEROSOL, SPRAY (ML) NASAL EVERY 6 HOURS
Refills: 0 | Status: DISCONTINUED | OUTPATIENT
Start: 2024-01-01 | End: 2024-01-01

## 2024-01-01 RX ORDER — HYDROCORTISONE ACETATE 25 MG/ML
20 VIAL (ML) INJECTION ONCE
Refills: 0 | Status: COMPLETED | OUTPATIENT
Start: 2024-01-01 | End: 2024-01-01

## 2024-01-01 RX ORDER — FERROUS SULFATE 325(65) MG
300 TABLET ORAL DAILY
Refills: 0 | Status: DISCONTINUED | OUTPATIENT
Start: 2024-01-01 | End: 2024-01-01

## 2024-01-01 RX ORDER — CEFEPIME 2 G/1
INJECTION, POWDER, FOR SOLUTION INTRAVENOUS
Refills: 0 | Status: DISCONTINUED | OUTPATIENT
Start: 2024-01-01 | End: 2024-01-01

## 2024-01-01 RX ORDER — ONDANSETRON HYDROCHLORIDE 4 MG/1
4 TABLET, FILM COATED ORAL ONCE
Refills: 0 | Status: DISCONTINUED | OUTPATIENT
Start: 2024-01-01 | End: 2024-01-01

## 2024-01-01 RX ORDER — SODIUM BICARBONATE 84 MG/ML
325 INJECTION, SOLUTION INTRAVENOUS ONCE
Refills: 0 | Status: DISCONTINUED | OUTPATIENT
Start: 2024-01-01 | End: 2024-01-01

## 2024-01-01 RX ORDER — TRIAMCINOLONE ACETONIDE 0.15 MG/G
1 AEROSOL, SPRAY TOPICAL EVERY 8 HOURS
Refills: 0 | Status: DISCONTINUED | OUTPATIENT
Start: 2024-01-01 | End: 2024-01-01

## 2024-01-01 RX ORDER — HYDROMORPHONE HYDROCHLORIDE 2 MG/1
0.5 TABLET ORAL EVERY 4 HOURS
Refills: 0 | Status: DISCONTINUED | OUTPATIENT
Start: 2024-01-01 | End: 2024-01-01

## 2024-01-01 RX ORDER — LIDOCAINE 40 MG/G
1 CREAM TOPICAL EVERY 24 HOURS
Refills: 0 | Status: DISCONTINUED | OUTPATIENT
Start: 2024-01-01 | End: 2024-01-01

## 2024-01-01 RX ORDER — PREDNISONE 20 MG/1
5 TABLET ORAL DAILY
Refills: 0 | Status: DISCONTINUED | OUTPATIENT
Start: 2024-01-01 | End: 2024-01-01

## 2024-01-01 RX ORDER — CEFEPIME 2 G/1
2000 INJECTION, POWDER, FOR SOLUTION INTRAVENOUS ONCE
Refills: 0 | Status: COMPLETED | OUTPATIENT
Start: 2024-01-01 | End: 2024-01-01

## 2024-01-01 RX ORDER — INFLUENZA VIRUS VACCINE 15; 15; 15; 15 UG/.5ML; UG/.5ML; UG/.5ML; UG/.5ML
0.5 SUSPENSION INTRAMUSCULAR ONCE
Refills: 0 | Status: DISCONTINUED | OUTPATIENT
Start: 2024-01-01 | End: 2024-01-01

## 2024-01-01 RX ORDER — POTASSIUM PHOSPHATE, MONOBASIC POTASSIUM PHOSPHATE, DIBASIC INJECTION, 236; 224 MG/ML; MG/ML
30 SOLUTION, CONCENTRATE INTRAVENOUS ONCE
Refills: 0 | Status: COMPLETED | OUTPATIENT
Start: 2024-01-01 | End: 2024-01-01

## 2024-01-01 RX ORDER — INSULIN GLARGINE 100 [IU]/ML
10 INJECTION, SOLUTION SUBCUTANEOUS
Refills: 0 | Status: DISCONTINUED | OUTPATIENT
Start: 2024-01-01 | End: 2024-01-01

## 2024-01-01 RX ORDER — SODIUM CHLORIDE 9 MG/ML
500 INJECTION, SOLUTION INTRAMUSCULAR; INTRAVENOUS; SUBCUTANEOUS ONCE
Refills: 0 | Status: COMPLETED | OUTPATIENT
Start: 2024-01-01 | End: 2024-01-01

## 2024-01-01 RX ORDER — LORAZEPAM 2 MG/1
2 TABLET ORAL ONCE
Refills: 0 | Status: DISCONTINUED | OUTPATIENT
Start: 2024-01-01 | End: 2024-01-01

## 2024-01-01 RX ORDER — OXYCODONE HYDROCHLORIDE 30 MG/1
2.5 TABLET ORAL ONCE
Refills: 0 | Status: DISCONTINUED | OUTPATIENT
Start: 2024-01-01 | End: 2024-01-01

## 2024-01-01 RX ORDER — SIMETHICONE 125 MG
160 CAPSULE ORAL
Refills: 0 | Status: DISCONTINUED | OUTPATIENT
Start: 2024-01-01 | End: 2024-01-01

## 2024-01-01 RX ORDER — CEFEPIME 2 G/1
1000 INJECTION, POWDER, FOR SOLUTION INTRAVENOUS EVERY 12 HOURS
Refills: 0 | Status: COMPLETED | OUTPATIENT
Start: 2024-01-01 | End: 2024-01-01

## 2024-01-01 RX ORDER — MAGNESIUM, ALUMINUM HYDROXIDE 200-225/5
30 SUSPENSION, ORAL (FINAL DOSE FORM) ORAL EVERY 4 HOURS
Refills: 0 | Status: DISCONTINUED | OUTPATIENT
Start: 2024-01-01 | End: 2024-01-01

## 2024-01-01 RX ORDER — INSULIN GLARGINE 100 [IU]/ML
20 INJECTION, SOLUTION SUBCUTANEOUS
Refills: 0 | Status: DISCONTINUED | OUTPATIENT
Start: 2024-01-01 | End: 2024-01-01

## 2024-01-01 RX ORDER — ACETAMINOPHEN 500MG 500 MG/1
650 TABLET, COATED ORAL EVERY 8 HOURS
Refills: 0 | Status: DISCONTINUED | OUTPATIENT
Start: 2024-01-01 | End: 2024-01-01

## 2024-01-01 RX ORDER — HEPARIN SODIUM 5000 [USP'U]/.5ML
15 INJECTION, SOLUTION INTRAVENOUS; SUBCUTANEOUS ONCE
Refills: 0 | Status: COMPLETED | OUTPATIENT
Start: 2024-01-01 | End: 2024-01-01

## 2024-01-01 RX ORDER — HYDROCORTISONE ACETATE 25 MG/ML
20 VIAL (ML) INJECTION
Refills: 0 | Status: DISCONTINUED | OUTPATIENT
Start: 2024-01-01 | End: 2024-01-01

## 2024-01-01 RX ORDER — PETROLATUM 960 MG/G
1 OINTMENT TOPICAL ONCE
Refills: 0 | Status: COMPLETED | OUTPATIENT
Start: 2024-01-01 | End: 2024-01-01

## 2024-01-01 RX ORDER — POTASSIUM CHLORIDE 600 MG/1
40 TABLET, EXTENDED RELEASE ORAL ONCE
Refills: 0 | Status: DISCONTINUED | OUTPATIENT
Start: 2024-01-01 | End: 2024-01-01

## 2024-01-01 RX ORDER — ESCITALOPRAM OXALATE 10 MG/1
10 TABLET, FILM COATED ORAL
Refills: 0 | Status: DISCONTINUED | OUTPATIENT
Start: 2024-01-01 | End: 2024-01-01

## 2024-01-01 RX ORDER — PREDNISONE 20 MG/1
5 TABLET ORAL
Refills: 0 | Status: DISCONTINUED | OUTPATIENT
Start: 2024-01-01 | End: 2024-01-01

## 2024-01-01 RX ORDER — ENOXAPARIN SODIUM 30 MG/.3ML
30 INJECTION SUBCUTANEOUS
Refills: 0 | Status: DISCONTINUED | OUTPATIENT
Start: 2024-01-01 | End: 2024-01-01

## 2024-01-01 RX ORDER — SODIUM CHLORIDE 9 MG/ML
250 INJECTION, SOLUTION INTRAMUSCULAR; INTRAVENOUS; SUBCUTANEOUS ONCE
Refills: 0 | Status: COMPLETED | OUTPATIENT
Start: 2024-01-01 | End: 2024-01-01

## 2024-01-01 RX ORDER — ACETAMINOPHEN 500MG 500 MG/1
1 TABLET, COATED ORAL
Refills: 0 | DISCHARGE

## 2024-01-01 RX ORDER — INSULIN GLARGINE 100 [IU]/ML
18 INJECTION, SOLUTION SUBCUTANEOUS
Refills: 0 | Status: DISCONTINUED | OUTPATIENT
Start: 2024-01-01 | End: 2024-01-01

## 2024-01-01 RX ORDER — INSULIN GLARGINE 100 [IU]/ML
21 INJECTION, SOLUTION SUBCUTANEOUS
Refills: 0 | Status: DISCONTINUED | OUTPATIENT
Start: 2024-01-01 | End: 2024-01-01

## 2024-01-01 RX ORDER — HYDROCORTISONE ACETATE 25 MG/ML
25 VIAL (ML) INJECTION DAILY
Refills: 0 | Status: DISCONTINUED | OUTPATIENT
Start: 2024-01-01 | End: 2024-01-01

## 2024-01-01 RX ORDER — KETOROLAC TROMETHAMINE 30 MG/ML
15 INJECTION INTRAMUSCULAR; INTRAVENOUS ONCE
Refills: 0 | Status: DISCONTINUED | OUTPATIENT
Start: 2024-01-01 | End: 2024-01-01

## 2024-01-01 RX ORDER — AZITHROMYCIN 250 MG/1
500 TABLET, FILM COATED ORAL ONCE
Refills: 0 | Status: COMPLETED | OUTPATIENT
Start: 2024-01-01 | End: 2024-01-01

## 2024-01-01 RX ORDER — HYDROCORTISONE ACETATE 25 MG/ML
25 VIAL (ML) INJECTION EVERY 8 HOURS
Refills: 0 | Status: DISCONTINUED | OUTPATIENT
Start: 2024-01-01 | End: 2024-01-01

## 2024-01-01 RX ORDER — ACETAMINOPHEN 500MG 500 MG/1
500 TABLET, COATED ORAL EVERY 6 HOURS
Refills: 0 | Status: DISCONTINUED | OUTPATIENT
Start: 2024-01-01 | End: 2024-01-01

## 2024-01-01 RX ORDER — HYDROMORPHONE HYDROCHLORIDE 2 MG/1
0.5 TABLET ORAL EVERY 6 HOURS
Refills: 0 | Status: DISCONTINUED | OUTPATIENT
Start: 2024-01-01 | End: 2024-01-01

## 2024-01-01 RX ORDER — MIDODRINE HYDROCHLORIDE 5 MG/1
15 TABLET ORAL ONCE
Refills: 0 | Status: COMPLETED | OUTPATIENT
Start: 2024-01-01 | End: 2024-01-01

## 2024-01-01 RX ORDER — POTASSIUM CHLORIDE 600 MG/1
20 TABLET, EXTENDED RELEASE ORAL EVERY 8 HOURS
Refills: 0 | Status: COMPLETED | OUTPATIENT
Start: 2024-01-01 | End: 2024-01-01

## 2024-01-01 RX ORDER — FENTANYL 12 UG/H
1 PATCH, EXTENDED RELEASE TRANSDERMAL
Qty: 1 | Refills: 0 | DISCHARGE
Start: 2024-01-01 | End: 2024-01-01

## 2024-01-01 RX ORDER — SODIUM CHLORIDE 9 MG/ML
4 INJECTION, SOLUTION INTRAMUSCULAR; INTRAVENOUS; SUBCUTANEOUS ONCE
Refills: 0 | Status: COMPLETED | OUTPATIENT
Start: 2024-01-01 | End: 2024-01-01

## 2024-01-01 RX ORDER — SALIVA SUBSTITUTE COMBO NO.3
5 AEROSOL, SPRAY WITH PUMP (ML) MUCOUS MEMBRANE EVERY 6 HOURS
Refills: 0 | Status: DISCONTINUED | OUTPATIENT
Start: 2024-01-01 | End: 2024-01-01

## 2024-01-01 RX ORDER — CHLORHEXIDINE GLUCONATE 1.2 MG/ML
1 RINSE ORAL DAILY
Refills: 0 | Status: DISCONTINUED | OUTPATIENT
Start: 2024-01-01 | End: 2024-01-01

## 2024-01-01 RX ORDER — HYDRALAZINE HYDROCHLORIDE 10 MG/1
25 TABLET ORAL EVERY 8 HOURS
Refills: 0 | Status: DISCONTINUED | OUTPATIENT
Start: 2024-01-01 | End: 2024-01-01

## 2024-01-01 RX ORDER — POTASSIUM CHLORIDE 600 MG/1
10 TABLET, EXTENDED RELEASE ORAL ONCE
Refills: 0 | Status: COMPLETED | OUTPATIENT
Start: 2024-01-01 | End: 2024-01-01

## 2024-01-01 RX ORDER — GABAPENTIN 300 MG/1
250 CAPSULE ORAL
Refills: 0 | Status: DISCONTINUED | OUTPATIENT
Start: 2024-01-01 | End: 2024-01-01

## 2024-01-01 RX ORDER — LORAZEPAM 2 MG/1
0.5 TABLET ORAL
Refills: 0 | Status: DISCONTINUED | OUTPATIENT
Start: 2024-01-01 | End: 2024-01-01

## 2024-01-01 RX ORDER — HYDROCORTISONE ACETATE 25 MG/ML
50 VIAL (ML) INJECTION EVERY 8 HOURS
Refills: 0 | Status: DISCONTINUED | OUTPATIENT
Start: 2024-01-01 | End: 2024-01-01

## 2024-01-01 RX ORDER — CEFTOLOZANE AND TAZOBACTAM 1; .5 G/10ML; G/10ML
3000 INJECTION, POWDER, LYOPHILIZED, FOR SOLUTION INTRAVENOUS EVERY 8 HOURS
Refills: 0 | Status: COMPLETED | OUTPATIENT
Start: 2024-01-01 | End: 2024-01-01

## 2024-01-01 RX ORDER — HYDROMORPHONE HYDROCHLORIDE 2 MG/1
0.2 TABLET ORAL
Refills: 0 | Status: DISCONTINUED | OUTPATIENT
Start: 2024-01-01 | End: 2024-01-01

## 2024-01-01 RX ORDER — POVIDONE, POLYVINYL ALCOHOL 20; 27 G/1000ML; G/1000ML
1 SOLUTION OPHTHALMIC
Refills: 0 | Status: DISCONTINUED | OUTPATIENT
Start: 2024-01-01 | End: 2024-01-01

## 2024-01-01 RX ORDER — PANTOPRAZOLE SODIUM 40 MG/1
1 TABLET, DELAYED RELEASE ORAL
Refills: 0 | DISCHARGE

## 2024-01-01 RX ORDER — LIDOCAINE 40 MG/G
4 CREAM TOPICAL EVERY 24 HOURS
Refills: 0 | Status: DISCONTINUED | OUTPATIENT
Start: 2024-01-01 | End: 2024-01-01

## 2024-01-01 RX ORDER — SODIUM CHLORIDE 9 MG/ML
4 INJECTION, SOLUTION INTRAMUSCULAR; INTRAVENOUS; SUBCUTANEOUS EVERY 6 HOURS
Refills: 0 | Status: COMPLETED | OUTPATIENT
Start: 2024-01-01 | End: 2024-01-01

## 2024-01-01 RX ORDER — HYDROMORPHONE HYDROCHLORIDE 2 MG/1
1 TABLET ORAL EVERY 4 HOURS
Refills: 0 | Status: DISCONTINUED | OUTPATIENT
Start: 2024-01-01 | End: 2024-01-01

## 2024-01-01 RX ORDER — SODIUM,POTASSIUM PHOSPHATES 278-250MG
1 POWDER IN PACKET (EA) ORAL ONCE
Refills: 0 | Status: COMPLETED | OUTPATIENT
Start: 2024-01-01 | End: 2024-01-01

## 2024-01-01 RX ORDER — FUROSEMIDE 40 MG/1
20 TABLET ORAL ONCE
Refills: 0 | Status: DISCONTINUED | OUTPATIENT
Start: 2024-01-01 | End: 2024-01-01

## 2024-01-01 RX ORDER — INSULIN GLARGINE 100 [IU]/ML
24 INJECTION, SOLUTION SUBCUTANEOUS
Refills: 0 | Status: DISCONTINUED | OUTPATIENT
Start: 2024-01-01 | End: 2024-01-01

## 2024-01-01 RX ADMIN — IPRATROPIUM BROMIDE AND ALBUTEROL SULFATE 3 MILLILITER(S): 2.5; .5 SOLUTION RESPIRATORY (INHALATION) at 18:08

## 2024-01-01 RX ADMIN — PANTOPRAZOLE SODIUM 40 MILLIGRAM(S): 40 TABLET, DELAYED RELEASE ORAL at 18:08

## 2024-01-01 RX ADMIN — POVIDONE, POLYVINYL ALCOHOL 1 DROP(S): 20; 27 SOLUTION OPHTHALMIC at 06:36

## 2024-01-01 RX ADMIN — PHENYLEPHRINE-SHARK LIVER OIL-MINERAL OIL-PETROLATUM RECTAL OINTMENT 1 APPLICATION(S): at 11:40

## 2024-01-01 RX ADMIN — Medication 5 MILLILITER(S): at 12:02

## 2024-01-01 RX ADMIN — INSULIN GLARGINE 10 UNIT(S): 100 INJECTION, SOLUTION SUBCUTANEOUS at 05:10

## 2024-01-01 RX ADMIN — CHLORHEXIDINE GLUCONATE 15 MILLILITER(S): 1.2 RINSE ORAL at 05:18

## 2024-01-01 RX ADMIN — FUROSEMIDE 20 MILLIGRAM(S): 40 TABLET ORAL at 15:12

## 2024-01-01 RX ADMIN — POVIDONE, POLYVINYL ALCOHOL 1 DROP(S): 20; 27 SOLUTION OPHTHALMIC at 23:25

## 2024-01-01 RX ADMIN — Medication 1 APPLICATION(S): at 21:52

## 2024-01-01 RX ADMIN — POTASSIUM CHLORIDE 100 MILLIEQUIVALENT(S): 600 TABLET, EXTENDED RELEASE ORAL at 09:36

## 2024-01-01 RX ADMIN — Medication 5: at 18:30

## 2024-01-01 RX ADMIN — PREDNISOLONE ACETATE 1 DROP(S): 1.2 SUSPENSION/ DROPS OPHTHALMIC at 05:21

## 2024-01-01 RX ADMIN — NYSTATIN 1 APPLICATION(S): 100000 POWDER TOPICAL at 05:38

## 2024-01-01 RX ADMIN — SODIUM CHLORIDE 1 GRAM(S): 9 INJECTION, SOLUTION INTRAMUSCULAR; INTRAVENOUS; SUBCUTANEOUS at 22:26

## 2024-01-01 RX ADMIN — WITCH HAZEL 1 APPLICATION(S): 50 SOLUTION RECTAL at 06:15

## 2024-01-01 RX ADMIN — Medication 12.5 MILLIGRAM(S): at 15:15

## 2024-01-01 RX ADMIN — HYDROMORPHONE HYDROCHLORIDE 0.2 MILLIGRAM(S): 2 TABLET ORAL at 20:42

## 2024-01-01 RX ADMIN — ENOXAPARIN SODIUM 30 MILLIGRAM(S): 30 INJECTION SUBCUTANEOUS at 22:01

## 2024-01-01 RX ADMIN — PANTOPRAZOLE SODIUM 40 MILLIGRAM(S): 40 TABLET, DELAYED RELEASE ORAL at 17:06

## 2024-01-01 RX ADMIN — NYSTATIN 1 APPLICATION(S): 100000 POWDER TOPICAL at 05:32

## 2024-01-01 RX ADMIN — NYSTATIN 1 APPLICATION(S): 100000 POWDER TOPICAL at 05:42

## 2024-01-01 RX ADMIN — WITCH HAZEL 1 APPLICATION(S): 50 SOLUTION RECTAL at 05:43

## 2024-01-01 RX ADMIN — DICLOFENAC SODIUM 2 GRAM(S): 20 SOLUTION TOPICAL at 23:25

## 2024-01-01 RX ADMIN — LIDOCAINE 4 PATCH: 40 CREAM TOPICAL at 11:40

## 2024-01-01 RX ADMIN — DIPHENHYDRAMINE HYDROCHLORIDE AND LIDOCAINE HYDROCHLORIDE AND ALUMINUM HYDROXIDE AND MAGNESIUM HYDRO 5 MILLILITER(S): KIT at 14:36

## 2024-01-01 RX ADMIN — Medication 160 MILLIGRAM(S): at 11:31

## 2024-01-01 RX ADMIN — ESCITALOPRAM OXALATE 10 MILLIGRAM(S): 10 TABLET, FILM COATED ORAL at 17:18

## 2024-01-01 RX ADMIN — Medication 1 TABLET(S): at 11:22

## 2024-01-01 RX ADMIN — PANTOPRAZOLE SODIUM 40 MILLIGRAM(S): 40 TABLET, DELAYED RELEASE ORAL at 05:47

## 2024-01-01 RX ADMIN — PANTOPRAZOLE SODIUM 40 MILLIGRAM(S): 40 TABLET, DELAYED RELEASE ORAL at 17:05

## 2024-01-01 RX ADMIN — DICLOFENAC SODIUM 2 GRAM(S): 20 SOLUTION TOPICAL at 18:53

## 2024-01-01 RX ADMIN — CHLORHEXIDINE GLUCONATE 15 MILLILITER(S): 1.2 RINSE ORAL at 19:06

## 2024-01-01 RX ADMIN — Medication 5 MILLILITER(S): at 18:17

## 2024-01-01 RX ADMIN — SODIUM CHLORIDE 1 GRAM(S): 9 INJECTION, SOLUTION INTRAMUSCULAR; INTRAVENOUS; SUBCUTANEOUS at 05:52

## 2024-01-01 RX ADMIN — IPRATROPIUM BROMIDE AND ALBUTEROL SULFATE 3 MILLILITER(S): 2.5; .5 SOLUTION RESPIRATORY (INHALATION) at 23:08

## 2024-01-01 RX ADMIN — TRIAMCINOLONE ACETONIDE 1 APPLICATION(S): 0.15 AEROSOL, SPRAY TOPICAL at 06:18

## 2024-01-01 RX ADMIN — TRIAMCINOLONE ACETONIDE 1 APPLICATION(S): 0.15 AEROSOL, SPRAY TOPICAL at 05:39

## 2024-01-01 RX ADMIN — PREDNISOLONE ACETATE 1 DROP(S): 1.2 SUSPENSION/ DROPS OPHTHALMIC at 05:41

## 2024-01-01 RX ADMIN — DICLOFENAC SODIUM 2 GRAM(S): 20 SOLUTION TOPICAL at 00:01

## 2024-01-01 RX ADMIN — Medication 2 MILLIGRAM(S): at 11:35

## 2024-01-01 RX ADMIN — INSULIN GLARGINE 10 UNIT(S): 100 INJECTION, SOLUTION SUBCUTANEOUS at 05:50

## 2024-01-01 RX ADMIN — DICLOFENAC SODIUM 2 GRAM(S): 20 SOLUTION TOPICAL at 17:10

## 2024-01-01 RX ADMIN — Medication 1 APPLICATION(S): at 21:26

## 2024-01-01 RX ADMIN — INSULIN GLARGINE 10 UNIT(S): 100 INJECTION, SOLUTION SUBCUTANEOUS at 05:39

## 2024-01-01 RX ADMIN — ESCITALOPRAM OXALATE 10 MILLIGRAM(S): 10 TABLET, FILM COATED ORAL at 18:09

## 2024-01-01 RX ADMIN — DICLOFENAC SODIUM 2 GRAM(S): 20 SOLUTION TOPICAL at 11:39

## 2024-01-01 RX ADMIN — IPRATROPIUM BROMIDE AND ALBUTEROL SULFATE 3 MILLILITER(S): 2.5; .5 SOLUTION RESPIRATORY (INHALATION) at 11:26

## 2024-01-01 RX ADMIN — LIDOCAINE 1 PATCH: 40 CREAM TOPICAL at 18:35

## 2024-01-01 RX ADMIN — HYDRALAZINE HYDROCHLORIDE 10 MILLIGRAM(S): 10 TABLET ORAL at 22:25

## 2024-01-01 RX ADMIN — SODIUM CHLORIDE 1 GRAM(S): 9 INJECTION, SOLUTION INTRAMUSCULAR; INTRAVENOUS; SUBCUTANEOUS at 05:26

## 2024-01-01 RX ADMIN — DICLOFENAC SODIUM 2 GRAM(S): 20 SOLUTION TOPICAL at 23:52

## 2024-01-01 RX ADMIN — CHLORHEXIDINE GLUCONATE 15 MILLILITER(S): 1.2 RINSE ORAL at 06:05

## 2024-01-01 RX ADMIN — DICLOFENAC SODIUM 2 GRAM(S): 20 SOLUTION TOPICAL at 18:23

## 2024-01-01 RX ADMIN — CHLORHEXIDINE GLUCONATE 15 MILLILITER(S): 1.2 RINSE ORAL at 05:36

## 2024-01-01 RX ADMIN — IPRATROPIUM BROMIDE AND ALBUTEROL SULFATE 3 MILLILITER(S): 2.5; .5 SOLUTION RESPIRATORY (INHALATION) at 11:32

## 2024-01-01 RX ADMIN — Medication 5 MILLILITER(S): at 05:00

## 2024-01-01 RX ADMIN — IPRATROPIUM BROMIDE AND ALBUTEROL SULFATE 3 MILLILITER(S): 2.5; .5 SOLUTION RESPIRATORY (INHALATION) at 17:21

## 2024-01-01 RX ADMIN — DICLOFENAC SODIUM 2 GRAM(S): 20 SOLUTION TOPICAL at 12:43

## 2024-01-01 RX ADMIN — DICLOFENAC SODIUM 2 GRAM(S): 20 SOLUTION TOPICAL at 21:27

## 2024-01-01 RX ADMIN — FENTANYL 1 PATCH: 12 PATCH, EXTENDED RELEASE TRANSDERMAL at 23:40

## 2024-01-01 RX ADMIN — ACETAMINOPHEN 500MG 1000 MILLIGRAM(S): 500 TABLET, COATED ORAL at 05:37

## 2024-01-01 RX ADMIN — DIPHENHYDRAMINE HYDROCHLORIDE AND LIDOCAINE HYDROCHLORIDE AND ALUMINUM HYDROXIDE AND MAGNESIUM HYDRO 5 MILLILITER(S): KIT at 15:27

## 2024-01-01 RX ADMIN — Medication 1 SPRAY(S): at 11:20

## 2024-01-01 RX ADMIN — Medication 1 SPRAY(S): at 00:04

## 2024-01-01 RX ADMIN — Medication 5 MILLILITER(S): at 17:05

## 2024-01-01 RX ADMIN — TRIAMCINOLONE ACETONIDE 1 APPLICATION(S): 0.15 AEROSOL, SPRAY TOPICAL at 05:20

## 2024-01-01 RX ADMIN — GABAPENTIN 250 MILLIGRAM(S): 300 CAPSULE ORAL at 22:08

## 2024-01-01 RX ADMIN — IPRATROPIUM BROMIDE AND ALBUTEROL SULFATE 3 MILLILITER(S): 2.5; .5 SOLUTION RESPIRATORY (INHALATION) at 17:46

## 2024-01-01 RX ADMIN — IPRATROPIUM BROMIDE AND ALBUTEROL SULFATE 3 MILLILITER(S): 2.5; .5 SOLUTION RESPIRATORY (INHALATION) at 11:30

## 2024-01-01 RX ADMIN — Medication 2 PACKET(S): at 05:14

## 2024-01-01 RX ADMIN — HYDROMORPHONE HYDROCHLORIDE 0.5 MILLIGRAM(S): 2 TABLET ORAL at 18:54

## 2024-01-01 RX ADMIN — Medication 1 SPRAY(S): at 21:54

## 2024-01-01 RX ADMIN — POVIDONE, POLYVINYL ALCOHOL 1 DROP(S): 20; 27 SOLUTION OPHTHALMIC at 12:39

## 2024-01-01 RX ADMIN — Medication 5 MILLILITER(S): at 22:11

## 2024-01-01 RX ADMIN — Medication 1 APPLICATION(S): at 21:31

## 2024-01-01 RX ADMIN — PREDNISONE 5 MILLIGRAM(S): 20 TABLET ORAL at 06:10

## 2024-01-01 RX ADMIN — ESCITALOPRAM OXALATE 10 MILLIGRAM(S): 10 TABLET, FILM COATED ORAL at 17:21

## 2024-01-01 RX ADMIN — DICLOFENAC SODIUM 2 GRAM(S): 20 SOLUTION TOPICAL at 07:10

## 2024-01-01 RX ADMIN — IPRATROPIUM BROMIDE AND ALBUTEROL SULFATE 3 MILLILITER(S): 2.5; .5 SOLUTION RESPIRATORY (INHALATION) at 00:17

## 2024-01-01 RX ADMIN — GABAPENTIN 250 MILLIGRAM(S): 300 CAPSULE ORAL at 21:58

## 2024-01-01 RX ADMIN — PREDNISONE 5 MILLIGRAM(S): 20 TABLET ORAL at 17:18

## 2024-01-01 RX ADMIN — Medication 1 SPRAY(S): at 00:51

## 2024-01-01 RX ADMIN — ACETAMINOPHEN 500MG 650 MILLIGRAM(S): 500 TABLET, COATED ORAL at 06:16

## 2024-01-01 RX ADMIN — IPRATROPIUM BROMIDE AND ALBUTEROL SULFATE 3 MILLILITER(S): 2.5; .5 SOLUTION RESPIRATORY (INHALATION) at 17:50

## 2024-01-01 RX ADMIN — DICLOFENAC SODIUM 2 GRAM(S): 20 SOLUTION TOPICAL at 23:59

## 2024-01-01 RX ADMIN — CEFTOLOZANE AND TAZOBACTAM 33.33 MILLIGRAM(S): 1; .5 INJECTION, POWDER, LYOPHILIZED, FOR SOLUTION INTRAVENOUS at 21:39

## 2024-01-01 RX ADMIN — Medication 12.5 MILLIGRAM(S): at 05:35

## 2024-01-01 RX ADMIN — AMLODIPINE BESYLATE 10 MILLIGRAM(S): 10 TABLET ORAL at 08:55

## 2024-01-01 RX ADMIN — WITCH HAZEL 1 APPLICATION(S): 50 SOLUTION RECTAL at 05:47

## 2024-01-01 RX ADMIN — Medication 12.5 MILLIGRAM(S): at 23:17

## 2024-01-01 RX ADMIN — DICLOFENAC SODIUM 2 GRAM(S): 20 SOLUTION TOPICAL at 23:44

## 2024-01-01 RX ADMIN — HEPARIN SODIUM 63.75 MILLIMOLE(S): 5000 INJECTION, SOLUTION INTRAVENOUS; SUBCUTANEOUS at 12:38

## 2024-01-01 RX ADMIN — SODIUM CHLORIDE 4 MILLILITER(S): 9 INJECTION, SOLUTION INTRAMUSCULAR; INTRAVENOUS; SUBCUTANEOUS at 11:20

## 2024-01-01 RX ADMIN — NYSTATIN 1 APPLICATION(S): 100000 POWDER TOPICAL at 06:04

## 2024-01-01 RX ADMIN — Medication 5 MILLILITER(S): at 23:47

## 2024-01-01 RX ADMIN — CHLORHEXIDINE GLUCONATE 15 MILLILITER(S): 1.2 RINSE ORAL at 17:23

## 2024-01-01 RX ADMIN — POVIDONE, POLYVINYL ALCOHOL 1 DROP(S): 20; 27 SOLUTION OPHTHALMIC at 05:51

## 2024-01-01 RX ADMIN — Medication 5 MILLIGRAM(S): at 05:13

## 2024-01-01 RX ADMIN — INSULIN GLARGINE 10 UNIT(S): 100 INJECTION, SOLUTION SUBCUTANEOUS at 06:38

## 2024-01-01 RX ADMIN — Medication 125 MICROGRAM(S): at 05:33

## 2024-01-01 RX ADMIN — Medication 4 MILLILITER(S): at 06:02

## 2024-01-01 RX ADMIN — Medication 12.5 MILLIGRAM(S): at 05:48

## 2024-01-01 RX ADMIN — ACETAMINOPHEN, DIPHENHYDRAMINE HCL, PHENYLEPHRINE HCL 12 MILLIGRAM(S): 325; 25; 5 TABLET ORAL at 23:01

## 2024-01-01 RX ADMIN — CHLORHEXIDINE GLUCONATE 1 APPLICATION(S): 1.2 RINSE ORAL at 21:53

## 2024-01-01 RX ADMIN — POVIDONE, POLYVINYL ALCOHOL 1 DROP(S): 20; 27 SOLUTION OPHTHALMIC at 18:12

## 2024-01-01 RX ADMIN — WITCH HAZEL 1 APPLICATION(S): 50 SOLUTION RECTAL at 05:49

## 2024-01-01 RX ADMIN — Medication 5 MILLILITER(S): at 18:05

## 2024-01-01 RX ADMIN — DICLOFENAC SODIUM 2 GRAM(S): 20 SOLUTION TOPICAL at 18:18

## 2024-01-01 RX ADMIN — PREDNISOLONE ACETATE 1 DROP(S): 1.2 SUSPENSION/ DROPS OPHTHALMIC at 18:00

## 2024-01-01 RX ADMIN — Medication 2: at 18:21

## 2024-01-01 RX ADMIN — Medication 1 SPRAY(S): at 11:53

## 2024-01-01 RX ADMIN — Medication 12.5 MILLIGRAM(S): at 17:21

## 2024-01-01 RX ADMIN — Medication 1 TABLET(S): at 11:46

## 2024-01-01 RX ADMIN — CHLORHEXIDINE GLUCONATE 15 MILLILITER(S): 1.2 RINSE ORAL at 05:23

## 2024-01-01 RX ADMIN — POVIDONE, POLYVINYL ALCOHOL 1 DROP(S): 20; 27 SOLUTION OPHTHALMIC at 23:43

## 2024-01-01 RX ADMIN — ESCITALOPRAM OXALATE 10 MILLIGRAM(S): 10 TABLET, FILM COATED ORAL at 18:21

## 2024-01-01 RX ADMIN — Medication 300 MILLIGRAM(S): at 11:31

## 2024-01-01 RX ADMIN — CHLORHEXIDINE GLUCONATE 1 APPLICATION(S): 213 SOLUTION TOPICAL at 05:14

## 2024-01-01 RX ADMIN — Medication 75 MILLILITER(S): at 17:07

## 2024-01-01 RX ADMIN — PREDNISOLONE ACETATE 1 DROP(S): 1.2 SUSPENSION/ DROPS OPHTHALMIC at 23:10

## 2024-01-01 RX ADMIN — PREDNISOLONE ACETATE 1 DROP(S): 1.2 SUSPENSION/ DROPS OPHTHALMIC at 23:08

## 2024-01-01 RX ADMIN — ACETAMINOPHEN 500MG 650 MILLIGRAM(S): 500 TABLET, COATED ORAL at 14:20

## 2024-01-01 RX ADMIN — PANTOPRAZOLE SODIUM 40 MILLIGRAM(S): 40 TABLET, DELAYED RELEASE ORAL at 17:52

## 2024-01-01 RX ADMIN — Medication 1 SPRAY(S): at 17:43

## 2024-01-01 RX ADMIN — DICLOFENAC SODIUM 2 GRAM(S): 20 SOLUTION TOPICAL at 06:44

## 2024-01-01 RX ADMIN — Medication 10 UNIT(S): at 18:09

## 2024-01-01 RX ADMIN — Medication 5 MILLILITER(S): at 18:32

## 2024-01-01 RX ADMIN — PANTOPRAZOLE SODIUM 40 MILLIGRAM(S): 40 TABLET, DELAYED RELEASE ORAL at 06:34

## 2024-01-01 RX ADMIN — SODIUM CHLORIDE 4 MILLILITER(S): 9 INJECTION, SOLUTION INTRAMUSCULAR; INTRAVENOUS; SUBCUTANEOUS at 06:10

## 2024-01-01 RX ADMIN — NYSTATIN 1 APPLICATION(S): 100000 POWDER TOPICAL at 21:15

## 2024-01-01 RX ADMIN — Medication 1 TABLET(S): at 15:26

## 2024-01-01 RX ADMIN — Medication 1 SPRAY(S): at 11:05

## 2024-01-01 RX ADMIN — NYSTATIN 1 APPLICATION(S): 100000 POWDER TOPICAL at 14:35

## 2024-01-01 RX ADMIN — WITCH HAZEL 1 APPLICATION(S): 50 SOLUTION RECTAL at 17:40

## 2024-01-01 RX ADMIN — PREDNISOLONE ACETATE 1 DROP(S): 1.2 SUSPENSION/ DROPS OPHTHALMIC at 05:11

## 2024-01-01 RX ADMIN — WITCH HAZEL 1 APPLICATION(S): 50 SOLUTION RECTAL at 17:45

## 2024-01-01 RX ADMIN — PREDNISONE 5 MILLIGRAM(S): 20 TABLET ORAL at 09:55

## 2024-01-01 RX ADMIN — WITCH HAZEL 1 APPLICATION(S): 50 SOLUTION RECTAL at 05:27

## 2024-01-01 RX ADMIN — LIDOCAINE 1 PATCH: 40 CREAM TOPICAL at 07:14

## 2024-01-01 RX ADMIN — DICLOFENAC SODIUM 2 GRAM(S): 20 SOLUTION TOPICAL at 00:12

## 2024-01-01 RX ADMIN — NYSTATIN 1 APPLICATION(S): 100000 POWDER TOPICAL at 15:26

## 2024-01-01 RX ADMIN — CHLORHEXIDINE GLUCONATE 15 MILLILITER(S): 1.2 RINSE ORAL at 07:08

## 2024-01-01 RX ADMIN — LIDOCAINE 1 PATCH: 40 CREAM TOPICAL at 08:44

## 2024-01-01 RX ADMIN — NYSTATIN 1 APPLICATION(S): 100000 POWDER TOPICAL at 05:18

## 2024-01-01 RX ADMIN — TRIAMCINOLONE ACETONIDE 1 APPLICATION(S): 0.15 AEROSOL, SPRAY TOPICAL at 06:04

## 2024-01-01 RX ADMIN — SODIUM CHLORIDE 1 GRAM(S): 9 INJECTION, SOLUTION INTRAMUSCULAR; INTRAVENOUS; SUBCUTANEOUS at 05:53

## 2024-01-01 RX ADMIN — CHLORHEXIDINE GLUCONATE 1 APPLICATION(S): 1.2 RINSE ORAL at 22:40

## 2024-01-01 RX ADMIN — DIPHENHYDRAMINE HYDROCHLORIDE AND LIDOCAINE HYDROCHLORIDE AND ALUMINUM HYDROXIDE AND MAGNESIUM HYDRO 5 MILLILITER(S): KIT at 23:26

## 2024-01-01 RX ADMIN — Medication 1 SPRAY(S): at 06:57

## 2024-01-01 RX ADMIN — DICLOFENAC SODIUM 2 GRAM(S): 20 SOLUTION TOPICAL at 05:07

## 2024-01-01 RX ADMIN — Medication 18 UNIT(S): at 06:12

## 2024-01-01 RX ADMIN — FENTANYL 1 PATCH: 12 PATCH, EXTENDED RELEASE TRANSDERMAL at 21:44

## 2024-01-01 RX ADMIN — Medication 1 APPLICATION(S): at 22:17

## 2024-01-01 RX ADMIN — PANTOPRAZOLE SODIUM 40 MILLIGRAM(S): 40 TABLET, DELAYED RELEASE ORAL at 05:32

## 2024-01-01 RX ADMIN — LIDOCAINE 4 PATCH: 40 CREAM TOPICAL at 20:15

## 2024-01-01 RX ADMIN — CHLORHEXIDINE GLUCONATE 15 MILLILITER(S): 1.2 RINSE ORAL at 05:12

## 2024-01-01 RX ADMIN — Medication 125 MILLIGRAM(S): at 18:06

## 2024-01-01 RX ADMIN — Medication 125 MILLIGRAM(S): at 17:52

## 2024-01-01 RX ADMIN — DICLOFENAC SODIUM 2 GRAM(S): 20 SOLUTION TOPICAL at 23:54

## 2024-01-01 RX ADMIN — FENTANYL 1 PATCH: 12 PATCH, EXTENDED RELEASE TRANSDERMAL at 19:30

## 2024-01-01 RX ADMIN — DICLOFENAC SODIUM 2 GRAM(S): 20 SOLUTION TOPICAL at 11:57

## 2024-01-01 RX ADMIN — ACETAMINOPHEN 500MG 650 MILLIGRAM(S): 500 TABLET, COATED ORAL at 22:00

## 2024-01-01 RX ADMIN — PANTOPRAZOLE SODIUM 40 MILLIGRAM(S): 40 TABLET, DELAYED RELEASE ORAL at 05:45

## 2024-01-01 RX ADMIN — POVIDONE, POLYVINYL ALCOHOL 1 DROP(S): 20; 27 SOLUTION OPHTHALMIC at 18:08

## 2024-01-01 RX ADMIN — Medication 12.5 MILLIGRAM(S): at 15:07

## 2024-01-01 RX ADMIN — SODIUM CHLORIDE 1 GRAM(S): 9 INJECTION, SOLUTION INTRAMUSCULAR; INTRAVENOUS; SUBCUTANEOUS at 15:19

## 2024-01-01 RX ADMIN — Medication 1 TABLET(S): at 12:43

## 2024-01-01 RX ADMIN — FENTANYL 1 PATCH: 12 PATCH, EXTENDED RELEASE TRANSDERMAL at 18:23

## 2024-01-01 RX ADMIN — Medication 5 MILLILITER(S): at 06:28

## 2024-01-01 RX ADMIN — DICLOFENAC SODIUM 2 GRAM(S): 20 SOLUTION TOPICAL at 05:16

## 2024-01-01 RX ADMIN — SODIUM CHLORIDE 1 GRAM(S): 9 INJECTION, SOLUTION INTRAMUSCULAR; INTRAVENOUS; SUBCUTANEOUS at 18:06

## 2024-01-01 RX ADMIN — LIDOCAINE 4 PATCH: 40 CREAM TOPICAL at 18:23

## 2024-01-01 RX ADMIN — Medication 1 TABLET(S): at 12:05

## 2024-01-01 RX ADMIN — PREDNISOLONE ACETATE 1 DROP(S): 1.2 SUSPENSION/ DROPS OPHTHALMIC at 06:59

## 2024-01-01 RX ADMIN — Medication 12.5 MILLIGRAM(S): at 15:55

## 2024-01-01 RX ADMIN — PREDNISOLONE ACETATE 1 DROP(S): 1.2 SUSPENSION/ DROPS OPHTHALMIC at 06:19

## 2024-01-01 RX ADMIN — DICLOFENAC SODIUM 2 GRAM(S): 20 SOLUTION TOPICAL at 12:28

## 2024-01-01 RX ADMIN — GABAPENTIN 250 MILLIGRAM(S): 300 CAPSULE ORAL at 23:27

## 2024-01-01 RX ADMIN — Medication 1 SPRAY(S): at 23:31

## 2024-01-01 RX ADMIN — Medication 1 TABLET(S): at 11:34

## 2024-01-01 RX ADMIN — Medication 125 MILLIGRAM(S): at 07:08

## 2024-01-01 RX ADMIN — WITCH HAZEL 1 APPLICATION(S): 50 SOLUTION RECTAL at 17:46

## 2024-01-01 RX ADMIN — MEROPENEM 100 MILLIGRAM(S): 500 INJECTION, POWDER, FOR SOLUTION INTRAVENOUS at 14:10

## 2024-01-01 RX ADMIN — IPRATROPIUM BROMIDE AND ALBUTEROL SULFATE 3 MILLILITER(S): 2.5; .5 SOLUTION RESPIRATORY (INHALATION) at 11:49

## 2024-01-01 RX ADMIN — CEFEPIME 100 MILLIGRAM(S): 2 INJECTION, POWDER, FOR SOLUTION INTRAVENOUS at 05:43

## 2024-01-01 RX ADMIN — POVIDONE, POLYVINYL ALCOHOL 1 DROP(S): 20; 27 SOLUTION OPHTHALMIC at 05:52

## 2024-01-01 RX ADMIN — Medication 125 MILLIGRAM(S): at 05:24

## 2024-01-01 RX ADMIN — FENTANYL 1 PATCH: 12 PATCH, EXTENDED RELEASE TRANSDERMAL at 07:04

## 2024-01-01 RX ADMIN — DIPHENHYDRAMINE HYDROCHLORIDE AND LIDOCAINE HYDROCHLORIDE AND ALUMINUM HYDROXIDE AND MAGNESIUM HYDRO 5 MILLILITER(S): KIT at 22:25

## 2024-01-01 RX ADMIN — Medication 1 TABLET(S): at 05:13

## 2024-01-01 RX ADMIN — PREDNISOLONE ACETATE 1 DROP(S): 1.2 SUSPENSION/ DROPS OPHTHALMIC at 23:49

## 2024-01-01 RX ADMIN — Medication 1 SPRAY(S): at 05:26

## 2024-01-01 RX ADMIN — POVIDONE, POLYVINYL ALCOHOL 1 DROP(S): 20; 27 SOLUTION OPHTHALMIC at 06:03

## 2024-01-01 RX ADMIN — Medication 5 MILLILITER(S): at 18:40

## 2024-01-01 RX ADMIN — ESCITALOPRAM OXALATE 10 MILLIGRAM(S): 10 TABLET, FILM COATED ORAL at 17:45

## 2024-01-01 RX ADMIN — ESCITALOPRAM OXALATE 10 MILLIGRAM(S): 10 TABLET, FILM COATED ORAL at 17:41

## 2024-01-01 RX ADMIN — Medication 5 UNIT(S): at 17:46

## 2024-01-01 RX ADMIN — Medication 5 MILLILITER(S): at 05:22

## 2024-01-01 RX ADMIN — CHLORHEXIDINE GLUCONATE 15 MILLILITER(S): 1.2 RINSE ORAL at 06:43

## 2024-01-01 RX ADMIN — NYSTATIN 1 APPLICATION(S): 100000 POWDER TOPICAL at 06:19

## 2024-01-01 RX ADMIN — Medication 250 MILLIGRAM(S): at 05:42

## 2024-01-01 RX ADMIN — Medication 2 MILLIGRAM(S): at 18:53

## 2024-01-01 RX ADMIN — Medication 1 TABLET(S): at 11:52

## 2024-01-01 RX ADMIN — CEFEPIME 100 MILLIGRAM(S): 2 INJECTION, POWDER, FOR SOLUTION INTRAVENOUS at 17:50

## 2024-01-01 RX ADMIN — TRIAMCINOLONE ACETONIDE 1 APPLICATION(S): 0.15 AEROSOL, SPRAY TOPICAL at 22:00

## 2024-01-01 RX ADMIN — WITCH HAZEL 1 APPLICATION(S): 50 SOLUTION RECTAL at 06:04

## 2024-01-01 RX ADMIN — CHLORHEXIDINE GLUCONATE 1 APPLICATION(S): 1.2 RINSE ORAL at 00:15

## 2024-01-01 RX ADMIN — Medication 1 SPRAY(S): at 12:00

## 2024-01-01 RX ADMIN — NYSTATIN 1 APPLICATION(S): 100000 POWDER TOPICAL at 22:24

## 2024-01-01 RX ADMIN — Medication 2 MILLIGRAM(S): at 05:20

## 2024-01-01 RX ADMIN — TRIAMCINOLONE ACETONIDE 1 APPLICATION(S): 0.15 AEROSOL, SPRAY TOPICAL at 06:23

## 2024-01-01 RX ADMIN — IPRATROPIUM BROMIDE AND ALBUTEROL SULFATE 3 MILLILITER(S): .5; 2.5 SOLUTION RESPIRATORY (INHALATION) at 05:33

## 2024-01-01 RX ADMIN — Medication 1 TABLET(S): at 10:20

## 2024-01-01 RX ADMIN — WITCH HAZEL 1 APPLICATION(S): 50 SOLUTION RECTAL at 05:12

## 2024-01-01 RX ADMIN — CHLORHEXIDINE GLUCONATE 15 MILLILITER(S): 1.2 RINSE ORAL at 05:19

## 2024-01-01 RX ADMIN — Medication 1 APPLICATION(S): at 22:56

## 2024-01-01 RX ADMIN — IPRATROPIUM BROMIDE AND ALBUTEROL SULFATE 3 MILLILITER(S): 2.5; .5 SOLUTION RESPIRATORY (INHALATION) at 05:26

## 2024-01-01 RX ADMIN — POVIDONE, POLYVINYL ALCOHOL 1 DROP(S): 20; 27 SOLUTION OPHTHALMIC at 00:51

## 2024-01-01 RX ADMIN — NYSTATIN 1 APPLICATION(S): 100000 POWDER TOPICAL at 13:47

## 2024-01-01 RX ADMIN — POTASSIUM PHOSPHATE, MONOBASIC POTASSIUM PHOSPHATE, DIBASIC INJECTION, 83.33 MILLIMOLE(S): 236; 224 SOLUTION, CONCENTRATE INTRAVENOUS at 12:18

## 2024-01-01 RX ADMIN — Medication 1 TABLET(S): at 12:28

## 2024-01-01 RX ADMIN — GABAPENTIN 250 MILLIGRAM(S): 300 CAPSULE ORAL at 22:54

## 2024-01-01 RX ADMIN — PANTOPRAZOLE SODIUM 40 MILLIGRAM(S): 40 TABLET, DELAYED RELEASE ORAL at 05:01

## 2024-01-01 RX ADMIN — LIDOCAINE 4 PATCH: 40 CREAM TOPICAL at 17:21

## 2024-01-01 RX ADMIN — IPRATROPIUM BROMIDE AND ALBUTEROL SULFATE 3 MILLILITER(S): 2.5; .5 SOLUTION RESPIRATORY (INHALATION) at 11:05

## 2024-01-01 RX ADMIN — DICLOFENAC SODIUM 2 GRAM(S): 20 SOLUTION TOPICAL at 23:10

## 2024-01-01 RX ADMIN — Medication 1 APPLICATION(S): at 21:20

## 2024-01-01 RX ADMIN — Medication 5 MILLILITER(S): at 12:29

## 2024-01-01 RX ADMIN — Medication 1 SPRAY(S): at 12:15

## 2024-01-01 RX ADMIN — Medication 1 TABLET(S): at 12:02

## 2024-01-01 RX ADMIN — LIDOCAINE 4 PATCH: 40 CREAM TOPICAL at 06:42

## 2024-01-01 RX ADMIN — SODIUM CHLORIDE 1 GRAM(S): 9 INJECTION, SOLUTION INTRAMUSCULAR; INTRAVENOUS; SUBCUTANEOUS at 14:19

## 2024-01-01 RX ADMIN — Medication 125 MILLIGRAM(S): at 06:34

## 2024-01-01 RX ADMIN — IPRATROPIUM BROMIDE AND ALBUTEROL SULFATE 3 MILLILITER(S): 2.5; .5 SOLUTION RESPIRATORY (INHALATION) at 05:37

## 2024-01-01 RX ADMIN — TRIAMCINOLONE ACETONIDE 1 APPLICATION(S): 0.15 AEROSOL, SPRAY TOPICAL at 22:27

## 2024-01-01 RX ADMIN — DICLOFENAC SODIUM 2 GRAM(S): 20 SOLUTION TOPICAL at 11:23

## 2024-01-01 RX ADMIN — Medication 50 MILLILITER(S): at 22:28

## 2024-01-01 RX ADMIN — CHLORHEXIDINE GLUCONATE 15 MILLILITER(S): 1.2 RINSE ORAL at 17:37

## 2024-01-01 RX ADMIN — WITCH HAZEL 1 APPLICATION(S): 50 SOLUTION RECTAL at 05:33

## 2024-01-01 RX ADMIN — TRIAMCINOLONE ACETONIDE 1 APPLICATION(S): 0.15 AEROSOL, SPRAY TOPICAL at 22:04

## 2024-01-01 RX ADMIN — Medication 125 MICROGRAM(S): at 06:10

## 2024-01-01 RX ADMIN — Medication 1 SPRAY(S): at 00:16

## 2024-01-01 RX ADMIN — FENTANYL 1 PATCH: 12 PATCH, EXTENDED RELEASE TRANSDERMAL at 19:20

## 2024-01-01 RX ADMIN — Medication 160 MILLIGRAM(S): at 12:23

## 2024-01-01 RX ADMIN — Medication 1 TABLET(S): at 12:17

## 2024-01-01 RX ADMIN — CHLORHEXIDINE GLUCONATE 15 MILLILITER(S): 1.2 RINSE ORAL at 18:12

## 2024-01-01 RX ADMIN — Medication 1: at 01:12

## 2024-01-01 RX ADMIN — Medication 7 UNIT(S): at 18:51

## 2024-01-01 RX ADMIN — PREDNISOLONE ACETATE 1 DROP(S): 1.2 SUSPENSION/ DROPS OPHTHALMIC at 05:58

## 2024-01-01 RX ADMIN — POVIDONE, POLYVINYL ALCOHOL 1 DROP(S): 20; 27 SOLUTION OPHTHALMIC at 23:08

## 2024-01-01 RX ADMIN — Medication 125 MILLIGRAM(S): at 11:01

## 2024-01-01 RX ADMIN — Medication 1 TABLET(S): at 11:25

## 2024-01-01 RX ADMIN — ACETAMINOPHEN 500MG 650 MILLIGRAM(S): 500 TABLET, COATED ORAL at 12:34

## 2024-01-01 RX ADMIN — PANTOPRAZOLE SODIUM 40 MILLIGRAM(S): 40 TABLET, DELAYED RELEASE ORAL at 17:42

## 2024-01-01 RX ADMIN — Medication 1 SPRAY(S): at 12:14

## 2024-01-01 RX ADMIN — DICLOFENAC SODIUM 2 GRAM(S): 20 SOLUTION TOPICAL at 05:12

## 2024-01-01 RX ADMIN — DICLOFENAC SODIUM 2 GRAM(S): 20 SOLUTION TOPICAL at 17:06

## 2024-01-01 RX ADMIN — Medication 1 SPRAY(S): at 06:49

## 2024-01-01 RX ADMIN — Medication 5 MILLILITER(S): at 11:19

## 2024-01-01 RX ADMIN — LIDOCAINE 1 PATCH: 40 CREAM TOPICAL at 07:25

## 2024-01-01 RX ADMIN — SODIUM CHLORIDE 1 GRAM(S): 9 INJECTION, SOLUTION INTRAMUSCULAR; INTRAVENOUS; SUBCUTANEOUS at 22:41

## 2024-01-01 RX ADMIN — LIDOCAINE 1 PATCH: 40 CREAM TOPICAL at 06:47

## 2024-01-01 RX ADMIN — Medication 1 SPRAY(S): at 23:54

## 2024-01-01 RX ADMIN — Medication 5 MILLILITER(S): at 00:32

## 2024-01-01 RX ADMIN — Medication 1 SPRAY(S): at 18:32

## 2024-01-01 RX ADMIN — OXYCODONE HYDROCHLORIDE 2.5 MILLIGRAM(S): 30 TABLET ORAL at 16:56

## 2024-01-01 RX ADMIN — FUROSEMIDE 20 MILLIGRAM(S): 40 TABLET ORAL at 12:10

## 2024-01-01 RX ADMIN — DICLOFENAC SODIUM 2 GRAM(S): 20 SOLUTION TOPICAL at 11:28

## 2024-01-01 RX ADMIN — IPRATROPIUM BROMIDE AND ALBUTEROL SULFATE 3 MILLILITER(S): 2.5; .5 SOLUTION RESPIRATORY (INHALATION) at 06:06

## 2024-01-01 RX ADMIN — ACETAMINOPHEN 500MG 1000 MILLIGRAM(S): 500 TABLET, COATED ORAL at 13:30

## 2024-01-01 RX ADMIN — NYSTATIN 1 APPLICATION(S): 100000 POWDER TOPICAL at 13:36

## 2024-01-01 RX ADMIN — POVIDONE, POLYVINYL ALCOHOL 1 DROP(S): 20; 27 SOLUTION OPHTHALMIC at 12:58

## 2024-01-01 RX ADMIN — PANTOPRAZOLE SODIUM 40 MILLIGRAM(S): 40 TABLET, DELAYED RELEASE ORAL at 18:07

## 2024-01-01 RX ADMIN — Medication 1 SPRAY(S): at 12:59

## 2024-01-01 RX ADMIN — Medication 125 MICROGRAM(S): at 04:04

## 2024-01-01 RX ADMIN — Medication 5 MILLILITER(S): at 11:39

## 2024-01-01 RX ADMIN — Medication 1 SPRAY(S): at 17:16

## 2024-01-01 RX ADMIN — DICLOFENAC SODIUM 2 GRAM(S): 20 SOLUTION TOPICAL at 12:07

## 2024-01-01 RX ADMIN — POTASSIUM CHLORIDE 40 MILLIEQUIVALENT(S): 600 TABLET, EXTENDED RELEASE ORAL at 14:34

## 2024-01-01 RX ADMIN — Medication 1 TABLET(S): at 12:34

## 2024-01-01 RX ADMIN — IPRATROPIUM BROMIDE AND ALBUTEROL SULFATE 3 MILLILITER(S): 2.5; .5 SOLUTION RESPIRATORY (INHALATION) at 17:19

## 2024-01-01 RX ADMIN — DIPHENHYDRAMINE HYDROCHLORIDE AND LIDOCAINE HYDROCHLORIDE AND ALUMINUM HYDROXIDE AND MAGNESIUM HYDRO 5 MILLILITER(S): KIT at 05:34

## 2024-01-01 RX ADMIN — PREDNISONE 5 MILLIGRAM(S): 20 TABLET ORAL at 09:25

## 2024-01-01 RX ADMIN — Medication 4: at 21:27

## 2024-01-01 RX ADMIN — Medication 2: at 13:12

## 2024-01-01 RX ADMIN — Medication 5 MILLILITER(S): at 06:08

## 2024-01-01 RX ADMIN — Medication 1 SPRAY(S): at 21:59

## 2024-01-01 RX ADMIN — Medication 5 MILLILITER(S): at 06:37

## 2024-01-01 RX ADMIN — Medication 12.5 MILLIGRAM(S): at 14:40

## 2024-01-01 RX ADMIN — Medication 14 UNIT(S): at 18:08

## 2024-01-01 RX ADMIN — Medication 125 MILLIGRAM(S): at 17:45

## 2024-01-01 RX ADMIN — Medication 1 SPRAY(S): at 05:48

## 2024-01-01 RX ADMIN — LIDOCAINE 4 PATCH: 40 CREAM TOPICAL at 05:35

## 2024-01-01 RX ADMIN — Medication 36 UNIT(S): at 13:27

## 2024-01-01 RX ADMIN — Medication 500 MILLIGRAM(S): at 11:31

## 2024-01-01 RX ADMIN — PREDNISONE 5 MILLIGRAM(S): 20 TABLET ORAL at 08:58

## 2024-01-01 RX ADMIN — Medication 75 MILLILITER(S): at 13:24

## 2024-01-01 RX ADMIN — IPRATROPIUM BROMIDE AND ALBUTEROL SULFATE 3 MILLILITER(S): 2.5; .5 SOLUTION RESPIRATORY (INHALATION) at 11:20

## 2024-01-01 RX ADMIN — TRIAMCINOLONE ACETONIDE 1 APPLICATION(S): 0.15 AEROSOL, SPRAY TOPICAL at 15:25

## 2024-01-01 RX ADMIN — NYSTATIN 1 APPLICATION(S): 100000 POWDER TOPICAL at 13:37

## 2024-01-01 RX ADMIN — CHLORHEXIDINE GLUCONATE 1 APPLICATION(S): 1.2 RINSE ORAL at 22:48

## 2024-01-01 RX ADMIN — Medication 2: at 23:45

## 2024-01-01 RX ADMIN — LIDOCAINE 4 PATCH: 40 CREAM TOPICAL at 19:31

## 2024-01-01 RX ADMIN — Medication 1: at 05:48

## 2024-01-01 RX ADMIN — ZINC OXIDE 1 APPLICATION(S): 200 OINTMENT TOPICAL at 11:42

## 2024-01-01 RX ADMIN — LIDOCAINE 1 PATCH: 4 CREAM TOPICAL at 01:44

## 2024-01-01 RX ADMIN — CEFEPIME 100 MILLIGRAM(S): 2 INJECTION, POWDER, FOR SOLUTION INTRAVENOUS at 05:17

## 2024-01-01 RX ADMIN — Medication 1: at 06:06

## 2024-01-01 RX ADMIN — OXYCODONE HYDROCHLORIDE 2.5 MILLIGRAM(S): 30 TABLET ORAL at 18:05

## 2024-01-01 RX ADMIN — LIDOCAINE 1 PATCH: 4 CREAM TOPICAL at 12:08

## 2024-01-01 RX ADMIN — IPRATROPIUM BROMIDE AND ALBUTEROL SULFATE 3 MILLILITER(S): 2.5; .5 SOLUTION RESPIRATORY (INHALATION) at 05:43

## 2024-01-01 RX ADMIN — Medication 5 MILLILITER(S): at 23:25

## 2024-01-01 RX ADMIN — PANTOPRAZOLE SODIUM 40 MILLIGRAM(S): 40 TABLET, DELAYED RELEASE ORAL at 05:04

## 2024-01-01 RX ADMIN — Medication 125 MILLIGRAM(S): at 12:38

## 2024-01-01 RX ADMIN — NYSTATIN 1 APPLICATION(S): 100000 POWDER TOPICAL at 21:23

## 2024-01-01 RX ADMIN — Medication 1 SPRAY(S): at 12:08

## 2024-01-01 RX ADMIN — INSULIN GLARGINE 10 UNIT(S): 100 INJECTION, SOLUTION SUBCUTANEOUS at 05:52

## 2024-01-01 RX ADMIN — Medication 5 MILLILITER(S): at 18:35

## 2024-01-01 RX ADMIN — NYSTATIN 1 APPLICATION(S): 100000 POWDER TOPICAL at 05:03

## 2024-01-01 RX ADMIN — Medication 13 UNIT(S): at 18:31

## 2024-01-01 RX ADMIN — DIPHENHYDRAMINE HYDROCHLORIDE AND LIDOCAINE HYDROCHLORIDE AND ALUMINUM HYDROXIDE AND MAGNESIUM HYDRO 5 MILLILITER(S): KIT at 14:10

## 2024-01-01 RX ADMIN — DICLOFENAC SODIUM 2 GRAM(S): 20 SOLUTION TOPICAL at 11:24

## 2024-01-01 RX ADMIN — CHLORHEXIDINE GLUCONATE 15 MILLILITER(S): 1.2 RINSE ORAL at 05:41

## 2024-01-01 RX ADMIN — WITCH HAZEL 1 APPLICATION(S): 50 SOLUTION RECTAL at 19:37

## 2024-01-01 RX ADMIN — Medication 5 MILLILITER(S): at 18:01

## 2024-01-01 RX ADMIN — Medication 5 MILLILITER(S): at 11:42

## 2024-01-01 RX ADMIN — SODIUM CHLORIDE 1 GRAM(S): 9 INJECTION, SOLUTION INTRAMUSCULAR; INTRAVENOUS; SUBCUTANEOUS at 13:37

## 2024-01-01 RX ADMIN — FENTANYL 1 PATCH: 12 PATCH, EXTENDED RELEASE TRANSDERMAL at 19:00

## 2024-01-01 RX ADMIN — INSULIN GLARGINE 14 UNIT(S): 100 INJECTION, SOLUTION SUBCUTANEOUS at 06:09

## 2024-01-01 RX ADMIN — ACETAMINOPHEN, DIPHENHYDRAMINE HCL, PHENYLEPHRINE HCL 12 MILLIGRAM(S): 325; 25; 5 TABLET ORAL at 21:24

## 2024-01-01 RX ADMIN — CEFTOLOZANE AND TAZOBACTAM 33.33 MILLIGRAM(S): 1; .5 INJECTION, POWDER, LYOPHILIZED, FOR SOLUTION INTRAVENOUS at 05:42

## 2024-01-01 RX ADMIN — GABAPENTIN 250 MILLIGRAM(S): 300 CAPSULE ORAL at 22:41

## 2024-01-01 RX ADMIN — Medication 0: at 05:40

## 2024-01-01 RX ADMIN — PANTOPRAZOLE SODIUM 40 MILLIGRAM(S): 40 TABLET, DELAYED RELEASE ORAL at 17:50

## 2024-01-01 RX ADMIN — Medication 5 MILLILITER(S): at 11:27

## 2024-01-01 RX ADMIN — SODIUM CHLORIDE 1 GRAM(S): 9 INJECTION, SOLUTION INTRAMUSCULAR; INTRAVENOUS; SUBCUTANEOUS at 22:02

## 2024-01-01 RX ADMIN — Medication 7 UNIT(S): at 18:36

## 2024-01-01 RX ADMIN — MEROPENEM 100 MILLIGRAM(S): 500 INJECTION, POWDER, FOR SOLUTION INTRAVENOUS at 14:39

## 2024-01-01 RX ADMIN — Medication 5 MILLILITER(S): at 17:41

## 2024-01-01 RX ADMIN — LIDOCAINE 5 MILLILITER(S): 4 CREAM TOPICAL at 19:40

## 2024-01-01 RX ADMIN — POVIDONE, POLYVINYL ALCOHOL 1 DROP(S): 20; 27 SOLUTION OPHTHALMIC at 12:38

## 2024-01-01 RX ADMIN — Medication 100 GRAM(S): at 11:48

## 2024-01-01 RX ADMIN — Medication 5 UNIT(S): at 17:44

## 2024-01-01 RX ADMIN — Medication 5 MILLILITER(S): at 22:00

## 2024-01-01 RX ADMIN — Medication 1 SPRAY(S): at 12:28

## 2024-01-01 RX ADMIN — POVIDONE, POLYVINYL ALCOHOL 1 DROP(S): 20; 27 SOLUTION OPHTHALMIC at 05:21

## 2024-01-01 RX ADMIN — Medication 500 MILLIGRAM(S): at 11:18

## 2024-01-01 RX ADMIN — TRIAMCINOLONE ACETONIDE 1 APPLICATION(S): 0.15 AEROSOL, SPRAY TOPICAL at 21:19

## 2024-01-01 RX ADMIN — PREDNISOLONE ACETATE 1 DROP(S): 1.2 SUSPENSION/ DROPS OPHTHALMIC at 23:35

## 2024-01-01 RX ADMIN — Medication 5 UNIT(S): at 23:33

## 2024-01-01 RX ADMIN — FENTANYL 1 PATCH: 12 PATCH, EXTENDED RELEASE TRANSDERMAL at 08:55

## 2024-01-01 RX ADMIN — POVIDONE, POLYVINYL ALCOHOL 1 DROP(S): 20; 27 SOLUTION OPHTHALMIC at 00:24

## 2024-01-01 RX ADMIN — POVIDONE, POLYVINYL ALCOHOL 1 DROP(S): 20; 27 SOLUTION OPHTHALMIC at 17:20

## 2024-01-01 RX ADMIN — Medication 1 SPRAY(S): at 05:19

## 2024-01-01 RX ADMIN — ACETAMINOPHEN, DIPHENHYDRAMINE HCL, PHENYLEPHRINE HCL 12 MILLIGRAM(S): 325; 25; 5 TABLET ORAL at 21:53

## 2024-01-01 RX ADMIN — HYDROMORPHONE HYDROCHLORIDE 0.5 MILLIGRAM(S): 2 TABLET ORAL at 12:15

## 2024-01-01 RX ADMIN — OXYCODONE HYDROCHLORIDE 2.5 MILLIGRAM(S): 30 TABLET ORAL at 01:06

## 2024-01-01 RX ADMIN — SODIUM CHLORIDE 250 MILLILITER(S): 9 INJECTION, SOLUTION INTRAMUSCULAR; INTRAVENOUS; SUBCUTANEOUS at 11:52

## 2024-01-01 RX ADMIN — POTASSIUM CHLORIDE 100 MILLIEQUIVALENT(S): 600 TABLET, EXTENDED RELEASE ORAL at 09:26

## 2024-01-01 RX ADMIN — OXYCODONE HYDROCHLORIDE 2.5 MILLIGRAM(S): 30 TABLET ORAL at 13:44

## 2024-01-01 RX ADMIN — MIDODRINE HYDROCHLORIDE 10 MILLIGRAM(S): 5 TABLET ORAL at 06:45

## 2024-01-01 RX ADMIN — DIPHENHYDRAMINE HYDROCHLORIDE AND LIDOCAINE HYDROCHLORIDE AND ALUMINUM HYDROXIDE AND MAGNESIUM HYDRO 5 MILLILITER(S): KIT at 05:54

## 2024-01-01 RX ADMIN — NYSTATIN 1 APPLICATION(S): 100000 POWDER TOPICAL at 22:26

## 2024-01-01 RX ADMIN — Medication 5 MILLILITER(S): at 06:47

## 2024-01-01 RX ADMIN — PANTOPRAZOLE SODIUM 40 MILLIGRAM(S): 40 TABLET, DELAYED RELEASE ORAL at 18:03

## 2024-01-01 RX ADMIN — LIDOCAINE 1 PATCH: 40 CREAM TOPICAL at 07:00

## 2024-01-01 RX ADMIN — DIPHENHYDRAMINE HYDROCHLORIDE AND LIDOCAINE HYDROCHLORIDE AND ALUMINUM HYDROXIDE AND MAGNESIUM HYDRO 5 MILLILITER(S): KIT at 13:37

## 2024-01-01 RX ADMIN — Medication 28 UNIT(S): at 12:39

## 2024-01-01 RX ADMIN — Medication 1 SPRAY(S): at 05:11

## 2024-01-01 RX ADMIN — LIDOCAINE 1 PATCH: 40 CREAM TOPICAL at 07:11

## 2024-01-01 RX ADMIN — Medication 1 TABLET(S): at 12:07

## 2024-01-01 RX ADMIN — Medication 125 MICROGRAM(S): at 05:12

## 2024-01-01 RX ADMIN — Medication 1 SPRAY(S): at 05:16

## 2024-01-01 RX ADMIN — ACETAMINOPHEN 500MG 400 MILLIGRAM(S): 500 TABLET, COATED ORAL at 22:20

## 2024-01-01 RX ADMIN — IPRATROPIUM BROMIDE AND ALBUTEROL SULFATE 3 MILLILITER(S): 2.5; .5 SOLUTION RESPIRATORY (INHALATION) at 17:56

## 2024-01-01 RX ADMIN — Medication 30 UNIT(S): at 12:35

## 2024-01-01 RX ADMIN — Medication 1 SPRAY(S): at 05:35

## 2024-01-01 RX ADMIN — DICLOFENAC SODIUM 2 GRAM(S): 20 SOLUTION TOPICAL at 05:44

## 2024-01-01 RX ADMIN — Medication 500 MILLIGRAM(S): at 11:47

## 2024-01-01 RX ADMIN — CHLORHEXIDINE GLUCONATE 1 APPLICATION(S): 1.2 RINSE ORAL at 21:43

## 2024-01-01 RX ADMIN — DICLOFENAC SODIUM 2 GRAM(S): 20 SOLUTION TOPICAL at 00:02

## 2024-01-01 RX ADMIN — ACETAMINOPHEN 500MG 650 MILLIGRAM(S): 500 TABLET, COATED ORAL at 13:34

## 2024-01-01 RX ADMIN — POVIDONE, POLYVINYL ALCOHOL 1 DROP(S): 20; 27 SOLUTION OPHTHALMIC at 21:57

## 2024-01-01 RX ADMIN — OXYCODONE HYDROCHLORIDE 2.5 MILLIGRAM(S): 30 TABLET ORAL at 01:33

## 2024-01-01 RX ADMIN — WITCH HAZEL 1 APPLICATION(S): 50 SOLUTION RECTAL at 18:03

## 2024-01-01 RX ADMIN — Medication 500 MILLIGRAM(S): at 12:01

## 2024-01-01 RX ADMIN — CHLORHEXIDINE GLUCONATE 15 MILLILITER(S): 1.2 RINSE ORAL at 05:57

## 2024-01-01 RX ADMIN — Medication 5 MILLILITER(S): at 23:32

## 2024-01-01 RX ADMIN — Medication 1 SPRAY(S): at 12:39

## 2024-01-01 RX ADMIN — Medication 125 MILLIGRAM(S): at 22:41

## 2024-01-01 RX ADMIN — FENTANYL 1 PATCH: 12 PATCH, EXTENDED RELEASE TRANSDERMAL at 21:25

## 2024-01-01 RX ADMIN — Medication 50 MILLILITER(S): at 21:10

## 2024-01-01 RX ADMIN — Medication 125 MICROGRAM(S): at 05:20

## 2024-01-01 RX ADMIN — WITCH HAZEL 1 APPLICATION(S): 50 SOLUTION RECTAL at 05:19

## 2024-01-01 RX ADMIN — PANTOPRAZOLE SODIUM 40 MILLIGRAM(S): 40 TABLET, DELAYED RELEASE ORAL at 05:57

## 2024-01-01 RX ADMIN — DICLOFENAC SODIUM 2 GRAM(S): 20 SOLUTION TOPICAL at 12:03

## 2024-01-01 RX ADMIN — DICLOFENAC SODIUM 2 GRAM(S): 20 SOLUTION TOPICAL at 22:12

## 2024-01-01 RX ADMIN — PANTOPRAZOLE SODIUM 40 MILLIGRAM(S): 40 TABLET, DELAYED RELEASE ORAL at 06:48

## 2024-01-01 RX ADMIN — QUETIAPINE FUMARATE 12.5 MILLIGRAM(S): 200 TABLET, FILM COATED ORAL at 17:31

## 2024-01-01 RX ADMIN — Medication 2 DROP(S): at 23:48

## 2024-01-01 RX ADMIN — IPRATROPIUM BROMIDE AND ALBUTEROL SULFATE 3 MILLILITER(S): 2.5; .5 SOLUTION RESPIRATORY (INHALATION) at 23:12

## 2024-01-01 RX ADMIN — METRONIDAZOLE 100 MILLIGRAM(S): 500 TABLET ORAL at 22:55

## 2024-01-01 RX ADMIN — FENTANYL 1 PATCH: 12 PATCH, EXTENDED RELEASE TRANSDERMAL at 06:57

## 2024-01-01 RX ADMIN — Medication 1 SPRAY(S): at 07:07

## 2024-01-01 RX ADMIN — IPRATROPIUM BROMIDE AND ALBUTEROL SULFATE 3 MILLILITER(S): 2.5; .5 SOLUTION RESPIRATORY (INHALATION) at 11:46

## 2024-01-01 RX ADMIN — POVIDONE, POLYVINYL ALCOHOL 1 DROP(S): 20; 27 SOLUTION OPHTHALMIC at 23:24

## 2024-01-01 RX ADMIN — SODIUM CHLORIDE 1 GRAM(S): 9 INJECTION, SOLUTION INTRAMUSCULAR; INTRAVENOUS; SUBCUTANEOUS at 18:52

## 2024-01-01 RX ADMIN — Medication 20 UNIT(S): at 16:02

## 2024-01-01 RX ADMIN — CEFEPIME 100 MILLIGRAM(S): 2 INJECTION, POWDER, FOR SOLUTION INTRAVENOUS at 18:04

## 2024-01-01 RX ADMIN — Medication 125 MILLILITER(S): at 21:59

## 2024-01-01 RX ADMIN — ACETAMINOPHEN 500MG 400 MILLIGRAM(S): 500 TABLET, COATED ORAL at 22:22

## 2024-01-01 RX ADMIN — Medication 1 SPRAY(S): at 12:43

## 2024-01-01 RX ADMIN — Medication 125 MILLIGRAM(S): at 23:04

## 2024-01-01 RX ADMIN — NYSTATIN 1 APPLICATION(S): 100000 POWDER TOPICAL at 05:22

## 2024-01-01 RX ADMIN — GABAPENTIN 250 MILLIGRAM(S): 300 CAPSULE ORAL at 21:14

## 2024-01-01 RX ADMIN — LIDOCAINE 4 PATCH: 40 CREAM TOPICAL at 04:00

## 2024-01-01 RX ADMIN — Medication 5 MILLILITER(S): at 17:10

## 2024-01-01 RX ADMIN — Medication 1 SPRAY(S): at 17:09

## 2024-01-01 RX ADMIN — NYSTATIN 1 APPLICATION(S): 100000 POWDER TOPICAL at 13:02

## 2024-01-01 RX ADMIN — Medication 30 UNIT(S): at 07:50

## 2024-01-01 RX ADMIN — Medication 5 MILLILITER(S): at 17:46

## 2024-01-01 RX ADMIN — Medication 1: at 18:15

## 2024-01-01 RX ADMIN — DICLOFENAC SODIUM 2 GRAM(S): 20 SOLUTION TOPICAL at 22:52

## 2024-01-01 RX ADMIN — POVIDONE, POLYVINYL ALCOHOL 1 DROP(S): 20; 27 SOLUTION OPHTHALMIC at 18:24

## 2024-01-01 RX ADMIN — WITCH HAZEL 1 APPLICATION(S): 50 SOLUTION RECTAL at 05:11

## 2024-01-01 RX ADMIN — POVIDONE, POLYVINYL ALCOHOL 1 DROP(S): 20; 27 SOLUTION OPHTHALMIC at 23:51

## 2024-01-01 RX ADMIN — PANTOPRAZOLE SODIUM 40 MILLIGRAM(S): 40 TABLET, DELAYED RELEASE ORAL at 17:08

## 2024-01-01 RX ADMIN — SODIUM CHLORIDE 1 GRAM(S): 9 INJECTION, SOLUTION INTRAMUSCULAR; INTRAVENOUS; SUBCUTANEOUS at 05:18

## 2024-01-01 RX ADMIN — LIDOCAINE 1 PATCH: 40 CREAM TOPICAL at 06:42

## 2024-01-01 RX ADMIN — Medication 1 APPLICATION(S): at 21:53

## 2024-01-01 RX ADMIN — PREDNISOLONE ACETATE 1 DROP(S): 1.2 SUSPENSION/ DROPS OPHTHALMIC at 17:56

## 2024-01-01 RX ADMIN — Medication 1 SPRAY(S): at 05:32

## 2024-01-01 RX ADMIN — DIPHENHYDRAMINE HYDROCHLORIDE AND LIDOCAINE HYDROCHLORIDE AND ALUMINUM HYDROXIDE AND MAGNESIUM HYDRO 5 MILLILITER(S): KIT at 05:42

## 2024-01-01 RX ADMIN — Medication 14 UNIT(S): at 12:26

## 2024-01-01 RX ADMIN — POVIDONE, POLYVINYL ALCOHOL 1 DROP(S): 20; 27 SOLUTION OPHTHALMIC at 23:31

## 2024-01-01 RX ADMIN — DICLOFENAC SODIUM 2 GRAM(S): 20 SOLUTION TOPICAL at 11:51

## 2024-01-01 RX ADMIN — Medication 5 MILLILITER(S): at 23:11

## 2024-01-01 RX ADMIN — Medication 5 MILLILITER(S): at 04:49

## 2024-01-01 RX ADMIN — LORAZEPAM 2 MILLIGRAM(S): 2 TABLET ORAL at 09:33

## 2024-01-01 RX ADMIN — Medication 1 SPRAY(S): at 23:10

## 2024-01-01 RX ADMIN — ESCITALOPRAM OXALATE 10 MILLIGRAM(S): 10 TABLET, FILM COATED ORAL at 17:08

## 2024-01-01 RX ADMIN — FENTANYL 1 PATCH: 12 PATCH, EXTENDED RELEASE TRANSDERMAL at 08:15

## 2024-01-01 RX ADMIN — TRIAMCINOLONE ACETONIDE 1 APPLICATION(S): 0.15 AEROSOL, SPRAY TOPICAL at 05:42

## 2024-01-01 RX ADMIN — Medication 125 MICROGRAM(S): at 03:27

## 2024-01-01 RX ADMIN — CHLORHEXIDINE GLUCONATE 15 MILLILITER(S): 1.2 RINSE ORAL at 17:36

## 2024-01-01 RX ADMIN — SODIUM CHLORIDE 1 GRAM(S): 9 INJECTION, SOLUTION INTRAMUSCULAR; INTRAVENOUS; SUBCUTANEOUS at 05:01

## 2024-01-01 RX ADMIN — POVIDONE, POLYVINYL ALCOHOL 1 DROP(S): 20; 27 SOLUTION OPHTHALMIC at 18:00

## 2024-01-01 RX ADMIN — POVIDONE, POLYVINYL ALCOHOL 1 DROP(S): 20; 27 SOLUTION OPHTHALMIC at 05:16

## 2024-01-01 RX ADMIN — Medication 125 MILLIGRAM(S): at 06:07

## 2024-01-01 RX ADMIN — POVIDONE, POLYVINYL ALCOHOL 1 DROP(S): 20; 27 SOLUTION OPHTHALMIC at 11:59

## 2024-01-01 RX ADMIN — Medication 12.5 MILLIGRAM(S): at 15:29

## 2024-01-01 RX ADMIN — NYSTATIN 1 APPLICATION(S): 100000 POWDER TOPICAL at 06:44

## 2024-01-01 RX ADMIN — Medication 1 TABLET(S): at 14:13

## 2024-01-01 RX ADMIN — INSULIN GLARGINE 20 UNIT(S): 100 INJECTION, SOLUTION SUBCUTANEOUS at 21:53

## 2024-01-01 RX ADMIN — FENTANYL 1 PATCH: 12 PATCH, EXTENDED RELEASE TRANSDERMAL at 19:53

## 2024-01-01 RX ADMIN — LIDOCAINE 1 PATCH: 4 CREAM TOPICAL at 21:50

## 2024-01-01 RX ADMIN — PREDNISOLONE ACETATE 1 DROP(S): 1.2 SUSPENSION/ DROPS OPHTHALMIC at 11:59

## 2024-01-01 RX ADMIN — NYSTATIN 1 APPLICATION(S): 100000 POWDER TOPICAL at 13:05

## 2024-01-01 RX ADMIN — DICLOFENAC SODIUM 2 GRAM(S): 20 SOLUTION TOPICAL at 23:55

## 2024-01-01 RX ADMIN — Medication 1 SPRAY(S): at 18:09

## 2024-01-01 RX ADMIN — ESCITALOPRAM OXALATE 10 MILLIGRAM(S): 10 TABLET, FILM COATED ORAL at 17:06

## 2024-01-01 RX ADMIN — PREDNISOLONE ACETATE 1 DROP(S): 1.2 SUSPENSION/ DROPS OPHTHALMIC at 23:44

## 2024-01-01 RX ADMIN — IPRATROPIUM BROMIDE AND ALBUTEROL SULFATE 3 MILLILITER(S): 2.5; .5 SOLUTION RESPIRATORY (INHALATION) at 11:38

## 2024-01-01 RX ADMIN — Medication 160 MILLIGRAM(S): at 11:29

## 2024-01-01 RX ADMIN — POVIDONE, POLYVINYL ALCOHOL 1 DROP(S): 20; 27 SOLUTION OPHTHALMIC at 00:01

## 2024-01-01 RX ADMIN — FENTANYL 1 PATCH: 12 PATCH, EXTENDED RELEASE TRANSDERMAL at 13:00

## 2024-01-01 RX ADMIN — INSULIN GLARGINE 10 UNIT(S): 100 INJECTION, SOLUTION SUBCUTANEOUS at 06:09

## 2024-01-01 RX ADMIN — NYSTATIN 1 APPLICATION(S): 100000 POWDER TOPICAL at 13:54

## 2024-01-01 RX ADMIN — CHLORHEXIDINE GLUCONATE 1 APPLICATION(S): 1.2 RINSE ORAL at 12:26

## 2024-01-01 RX ADMIN — PREDNISONE 5 MILLIGRAM(S): 20 TABLET ORAL at 08:21

## 2024-01-01 RX ADMIN — Medication 1 TABLET(S): at 10:30

## 2024-01-01 RX ADMIN — NYSTATIN 1 APPLICATION(S): 100000 POWDER TOPICAL at 14:41

## 2024-01-01 RX ADMIN — SODIUM CHLORIDE 1 GRAM(S): 9 INJECTION, SOLUTION INTRAMUSCULAR; INTRAVENOUS; SUBCUTANEOUS at 14:52

## 2024-01-01 RX ADMIN — Medication 1: at 11:23

## 2024-01-01 RX ADMIN — Medication 5 MILLILITER(S): at 05:53

## 2024-01-01 RX ADMIN — FUROSEMIDE 20 MILLIGRAM(S): 40 TABLET ORAL at 00:15

## 2024-01-01 RX ADMIN — LIDOCAINE 4 PATCH: 40 CREAM TOPICAL at 17:39

## 2024-01-01 RX ADMIN — DICLOFENAC SODIUM 2 GRAM(S): 20 SOLUTION TOPICAL at 05:22

## 2024-01-01 RX ADMIN — WITCH HAZEL 1 APPLICATION(S): 50 SOLUTION RECTAL at 18:30

## 2024-01-01 RX ADMIN — Medication 125 MILLILITER(S): at 10:35

## 2024-01-01 RX ADMIN — Medication 125 MILLIGRAM(S): at 17:36

## 2024-01-01 RX ADMIN — Medication 10 UNIT(S): at 07:41

## 2024-01-01 RX ADMIN — Medication 1 SPRAY(S): at 05:47

## 2024-01-01 RX ADMIN — Medication 1 SPRAY(S): at 17:22

## 2024-01-01 RX ADMIN — DICLOFENAC SODIUM 2 GRAM(S): 20 SOLUTION TOPICAL at 00:17

## 2024-01-01 RX ADMIN — Medication 13 UNIT(S): at 18:55

## 2024-01-01 RX ADMIN — Medication 1 SPRAY(S): at 00:00

## 2024-01-01 RX ADMIN — Medication 4 MILLILITER(S): at 05:46

## 2024-01-01 RX ADMIN — SODIUM CHLORIDE 1 GRAM(S): 9 INJECTION, SOLUTION INTRAMUSCULAR; INTRAVENOUS; SUBCUTANEOUS at 17:52

## 2024-01-01 RX ADMIN — LIDOCAINE 4 PATCH: 40 CREAM TOPICAL at 06:10

## 2024-01-01 RX ADMIN — Medication 25 MILLIGRAM(S): at 05:15

## 2024-01-01 RX ADMIN — CHLORHEXIDINE GLUCONATE 1 APPLICATION(S): 1.2 RINSE ORAL at 21:20

## 2024-01-01 RX ADMIN — POVIDONE, POLYVINYL ALCOHOL 1 DROP(S): 20; 27 SOLUTION OPHTHALMIC at 05:05

## 2024-01-01 RX ADMIN — Medication 300 MILLIGRAM(S): at 15:41

## 2024-01-01 RX ADMIN — Medication 50 MILLILITER(S): at 08:54

## 2024-01-01 RX ADMIN — Medication 1 TABLET(S): at 11:31

## 2024-01-01 RX ADMIN — PREDNISOLONE ACETATE 1 DROP(S): 1.2 SUSPENSION/ DROPS OPHTHALMIC at 12:29

## 2024-01-01 RX ADMIN — HYDRALAZINE HYDROCHLORIDE 5 MILLIGRAM(S): 10 TABLET ORAL at 12:23

## 2024-01-01 RX ADMIN — Medication 1 SPRAY(S): at 23:46

## 2024-01-01 RX ADMIN — DICLOFENAC SODIUM 2 GRAM(S): 20 SOLUTION TOPICAL at 23:49

## 2024-01-01 RX ADMIN — HYDRALAZINE HYDROCHLORIDE 10 MILLIGRAM(S): 10 TABLET ORAL at 05:12

## 2024-01-01 RX ADMIN — FUROSEMIDE 20 MILLIGRAM(S): 40 TABLET ORAL at 14:03

## 2024-01-01 RX ADMIN — Medication 5 MILLILITER(S): at 00:22

## 2024-01-01 RX ADMIN — Medication 3: at 11:53

## 2024-01-01 RX ADMIN — ACETAMINOPHEN 500MG 500 MILLIGRAM(S): 500 TABLET, COATED ORAL at 00:20

## 2024-01-01 RX ADMIN — Medication 1 TABLET(S): at 12:10

## 2024-01-01 RX ADMIN — CHLORHEXIDINE GLUCONATE 1 APPLICATION(S): 1.2 RINSE ORAL at 22:54

## 2024-01-01 RX ADMIN — Medication 4 MILLILITER(S): at 18:06

## 2024-01-01 RX ADMIN — Medication 5 MILLILITER(S): at 05:09

## 2024-01-01 RX ADMIN — DICLOFENAC SODIUM 2 GRAM(S): 20 SOLUTION TOPICAL at 18:33

## 2024-01-01 RX ADMIN — DICLOFENAC SODIUM 2 GRAM(S): 20 SOLUTION TOPICAL at 05:48

## 2024-01-01 RX ADMIN — IPRATROPIUM BROMIDE AND ALBUTEROL SULFATE 3 MILLILITER(S): 2.5; .5 SOLUTION RESPIRATORY (INHALATION) at 05:13

## 2024-01-01 RX ADMIN — Medication 125 MICROGRAM(S): at 05:55

## 2024-01-01 RX ADMIN — Medication 5 MILLILITER(S): at 11:48

## 2024-01-01 RX ADMIN — Medication 160 MILLIGRAM(S): at 10:34

## 2024-01-01 RX ADMIN — Medication 12.5 MILLIGRAM(S): at 17:25

## 2024-01-01 RX ADMIN — NYSTATIN 1 APPLICATION(S): 100000 POWDER TOPICAL at 14:03

## 2024-01-01 RX ADMIN — Medication 4 MILLILITER(S): at 05:48

## 2024-01-01 RX ADMIN — POTASSIUM CHLORIDE 40 MILLIEQUIVALENT(S): 600 TABLET, EXTENDED RELEASE ORAL at 10:15

## 2024-01-01 RX ADMIN — LIDOCAINE 4 PATCH: 40 CREAM TOPICAL at 18:22

## 2024-01-01 RX ADMIN — POTASSIUM CHLORIDE 100 MILLIEQUIVALENT(S): 600 TABLET, EXTENDED RELEASE ORAL at 12:12

## 2024-01-01 RX ADMIN — POVIDONE, POLYVINYL ALCOHOL 1 DROP(S): 20; 27 SOLUTION OPHTHALMIC at 11:32

## 2024-01-01 RX ADMIN — Medication 1 SPRAY(S): at 12:16

## 2024-01-01 RX ADMIN — PREDNISOLONE ACETATE 1 DROP(S): 1.2 SUSPENSION/ DROPS OPHTHALMIC at 12:59

## 2024-01-01 RX ADMIN — TRIAMCINOLONE ACETONIDE 1 APPLICATION(S): 0.15 AEROSOL, SPRAY TOPICAL at 06:31

## 2024-01-01 RX ADMIN — DIPHENHYDRAMINE HYDROCHLORIDE AND LIDOCAINE HYDROCHLORIDE AND ALUMINUM HYDROXIDE AND MAGNESIUM HYDRO 5 MILLILITER(S): KIT at 05:15

## 2024-01-01 RX ADMIN — Medication 1 TABLET(S): at 11:27

## 2024-01-01 RX ADMIN — DICLOFENAC SODIUM 2 GRAM(S): 20 SOLUTION TOPICAL at 17:37

## 2024-01-01 RX ADMIN — FENTANYL 1 PATCH: 12 PATCH, EXTENDED RELEASE TRANSDERMAL at 16:54

## 2024-01-01 RX ADMIN — NYSTATIN 1 APPLICATION(S): 100000 POWDER TOPICAL at 15:22

## 2024-01-01 RX ADMIN — Medication 1: at 12:31

## 2024-01-01 RX ADMIN — Medication 28 UNIT(S): at 11:48

## 2024-01-01 RX ADMIN — LIDOCAINE 4 PATCH: 40 CREAM TOPICAL at 18:05

## 2024-01-01 RX ADMIN — Medication 1 TABLET(S): at 10:59

## 2024-01-01 RX ADMIN — IPRATROPIUM BROMIDE AND ALBUTEROL SULFATE 3 MILLILITER(S): 2.5; .5 SOLUTION RESPIRATORY (INHALATION) at 05:23

## 2024-01-01 RX ADMIN — TRIAMCINOLONE ACETONIDE 1 APPLICATION(S): 0.15 AEROSOL, SPRAY TOPICAL at 15:40

## 2024-01-01 RX ADMIN — ACETAMINOPHEN 500MG 400 MILLIGRAM(S): 500 TABLET, COATED ORAL at 21:45

## 2024-01-01 RX ADMIN — Medication 20 UNIT(S): at 08:13

## 2024-01-01 RX ADMIN — OXYCODONE HYDROCHLORIDE 2.5 MILLIGRAM(S): 30 TABLET ORAL at 19:55

## 2024-01-01 RX ADMIN — Medication 125 MILLIGRAM(S): at 12:02

## 2024-01-01 RX ADMIN — POVIDONE, POLYVINYL ALCOHOL 1 DROP(S): 20; 27 SOLUTION OPHTHALMIC at 05:54

## 2024-01-01 RX ADMIN — Medication 3 MILLILITER(S): at 05:38

## 2024-01-01 RX ADMIN — DIPHENHYDRAMINE HYDROCHLORIDE AND LIDOCAINE HYDROCHLORIDE AND ALUMINUM HYDROXIDE AND MAGNESIUM HYDRO 5 MILLILITER(S): KIT at 23:05

## 2024-01-01 RX ADMIN — SODIUM CHLORIDE 1 GRAM(S): 9 INJECTION, SOLUTION INTRAMUSCULAR; INTRAVENOUS; SUBCUTANEOUS at 18:07

## 2024-01-01 RX ADMIN — LIDOCAINE 1 PATCH: 4 CREAM TOPICAL at 00:00

## 2024-01-01 RX ADMIN — NYSTATIN 1 APPLICATION(S): 100000 POWDER TOPICAL at 15:15

## 2024-01-01 RX ADMIN — WITCH HAZEL 1 APPLICATION(S): 50 SOLUTION RECTAL at 06:37

## 2024-01-01 RX ADMIN — PREDNISONE 5 MILLIGRAM(S): 20 TABLET ORAL at 05:39

## 2024-01-01 RX ADMIN — PANTOPRAZOLE SODIUM 40 MILLIGRAM(S): 40 TABLET, DELAYED RELEASE ORAL at 05:23

## 2024-01-01 RX ADMIN — FENTANYL 1 PATCH: 12 PATCH, EXTENDED RELEASE TRANSDERMAL at 07:00

## 2024-01-01 RX ADMIN — MEROPENEM 100 MILLIGRAM(S): 500 INJECTION, POWDER, FOR SOLUTION INTRAVENOUS at 14:04

## 2024-01-01 RX ADMIN — ACETAMINOPHEN 500MG 1000 MILLIGRAM(S): 500 TABLET, COATED ORAL at 23:00

## 2024-01-01 RX ADMIN — LIDOCAINE 1 PATCH: 40 CREAM TOPICAL at 05:39

## 2024-01-01 RX ADMIN — CHLORHEXIDINE GLUCONATE 1 APPLICATION(S): 1.2 RINSE ORAL at 11:31

## 2024-01-01 RX ADMIN — Medication 1 TABLET(S): at 11:23

## 2024-01-01 RX ADMIN — MEROPENEM 100 MILLIGRAM(S): 500 INJECTION, POWDER, FOR SOLUTION INTRAVENOUS at 22:20

## 2024-01-01 RX ADMIN — FENTANYL 1 PATCH: 12 PATCH, EXTENDED RELEASE TRANSDERMAL at 23:00

## 2024-01-01 RX ADMIN — Medication 50 MILLILITER(S): at 23:31

## 2024-01-01 RX ADMIN — Medication 1 TABLET(S): at 12:21

## 2024-01-01 RX ADMIN — Medication 25 GRAM(S): at 10:06

## 2024-01-01 RX ADMIN — FENTANYL 1 PATCH: 12 PATCH, EXTENDED RELEASE TRANSDERMAL at 00:00

## 2024-01-01 RX ADMIN — IPRATROPIUM BROMIDE AND ALBUTEROL SULFATE 3 MILLILITER(S): 2.5; .5 SOLUTION RESPIRATORY (INHALATION) at 23:41

## 2024-01-01 RX ADMIN — PANTOPRAZOLE SODIUM 40 MILLIGRAM(S): 40 TABLET, DELAYED RELEASE ORAL at 05:18

## 2024-01-01 RX ADMIN — PREDNISONE 5 MILLIGRAM(S): 20 TABLET ORAL at 10:20

## 2024-01-01 RX ADMIN — CHLORHEXIDINE GLUCONATE 1 APPLICATION(S): 1.2 RINSE ORAL at 23:26

## 2024-01-01 RX ADMIN — INSULIN GLARGINE 10 UNIT(S): 100 INJECTION, SOLUTION SUBCUTANEOUS at 06:18

## 2024-01-01 RX ADMIN — Medication 125 MILLIGRAM(S): at 11:53

## 2024-01-01 RX ADMIN — SODIUM CHLORIDE 1 GRAM(S): 9 INJECTION, SOLUTION INTRAMUSCULAR; INTRAVENOUS; SUBCUTANEOUS at 04:38

## 2024-01-01 RX ADMIN — DICLOFENAC SODIUM 2 GRAM(S): 20 SOLUTION TOPICAL at 05:27

## 2024-01-01 RX ADMIN — HYDROMORPHONE HYDROCHLORIDE 0.5 MILLIGRAM(S): 2 TABLET ORAL at 17:41

## 2024-01-01 RX ADMIN — POVIDONE, POLYVINYL ALCOHOL 1 DROP(S): 20; 27 SOLUTION OPHTHALMIC at 11:42

## 2024-01-01 RX ADMIN — Medication 500 MILLIGRAM(S): at 11:44

## 2024-01-01 RX ADMIN — Medication 5 MILLILITER(S): at 17:21

## 2024-01-01 RX ADMIN — CHLORHEXIDINE GLUCONATE 15 MILLILITER(S): 1.2 RINSE ORAL at 17:10

## 2024-01-01 RX ADMIN — ACETAMINOPHEN 500MG 650 MILLIGRAM(S): 500 TABLET, COATED ORAL at 21:25

## 2024-01-01 RX ADMIN — Medication 14 UNIT(S): at 06:39

## 2024-01-01 RX ADMIN — Medication 5 MILLILITER(S): at 06:41

## 2024-01-01 RX ADMIN — LIDOCAINE 4 PATCH: 40 CREAM TOPICAL at 02:52

## 2024-01-01 RX ADMIN — Medication 1 APPLICATION(S): at 22:03

## 2024-01-01 RX ADMIN — Medication 1 SPRAY(S): at 12:07

## 2024-01-01 RX ADMIN — Medication 125 MICROGRAM(S): at 05:49

## 2024-01-01 RX ADMIN — Medication 1 SPRAY(S): at 12:37

## 2024-01-01 RX ADMIN — Medication 125 MICROGRAM(S): at 05:39

## 2024-01-01 RX ADMIN — FUROSEMIDE 20 MILLIGRAM(S): 40 TABLET ORAL at 17:41

## 2024-01-01 RX ADMIN — SODIUM CHLORIDE 1 GRAM(S): 9 INJECTION, SOLUTION INTRAMUSCULAR; INTRAVENOUS; SUBCUTANEOUS at 17:12

## 2024-01-01 RX ADMIN — PREDNISOLONE ACETATE 1 DROP(S): 1.2 SUSPENSION/ DROPS OPHTHALMIC at 05:39

## 2024-01-01 RX ADMIN — PREDNISONE 5 MILLIGRAM(S): 20 TABLET ORAL at 09:19

## 2024-01-01 RX ADMIN — Medication 12.5 MILLIGRAM(S): at 15:25

## 2024-01-01 RX ADMIN — Medication 1 TABLET(S): at 12:11

## 2024-01-01 RX ADMIN — GABAPENTIN 250 MILLIGRAM(S): 300 CAPSULE ORAL at 22:01

## 2024-01-01 RX ADMIN — DICLOFENAC SODIUM 2 GRAM(S): 20 SOLUTION TOPICAL at 05:50

## 2024-01-01 RX ADMIN — LIDOCAINE 4 PATCH: 40 CREAM TOPICAL at 19:45

## 2024-01-01 RX ADMIN — Medication 5 MILLILITER(S): at 11:33

## 2024-01-01 RX ADMIN — Medication 2 PACKET(S): at 17:21

## 2024-01-01 RX ADMIN — LIDOCAINE 4 PATCH: 40 CREAM TOPICAL at 06:18

## 2024-01-01 RX ADMIN — FENTANYL 1 PATCH: 12 PATCH, EXTENDED RELEASE TRANSDERMAL at 19:12

## 2024-01-01 RX ADMIN — Medication 1 SPRAY(S): at 12:17

## 2024-01-01 RX ADMIN — INSULIN GLARGINE 3 UNIT(S): 100 INJECTION, SOLUTION SUBCUTANEOUS at 11:41

## 2024-01-01 RX ADMIN — Medication 10: at 19:58

## 2024-01-01 RX ADMIN — NYSTATIN 1 APPLICATION(S): 100000 POWDER TOPICAL at 05:43

## 2024-01-01 RX ADMIN — PANTOPRAZOLE SODIUM 40 MILLIGRAM(S): 40 TABLET, DELAYED RELEASE ORAL at 05:35

## 2024-01-01 RX ADMIN — IPRATROPIUM BROMIDE AND ALBUTEROL SULFATE 3 MILLILITER(S): 2.5; .5 SOLUTION RESPIRATORY (INHALATION) at 17:36

## 2024-01-01 RX ADMIN — TRIAMCINOLONE ACETONIDE 1 APPLICATION(S): 0.15 AEROSOL, SPRAY TOPICAL at 22:24

## 2024-01-01 RX ADMIN — Medication 1 TABLET(S): at 11:43

## 2024-01-01 RX ADMIN — Medication 125 MILLIGRAM(S): at 17:23

## 2024-01-01 RX ADMIN — DIPHENHYDRAMINE HYDROCHLORIDE AND LIDOCAINE HYDROCHLORIDE AND ALUMINUM HYDROXIDE AND MAGNESIUM HYDRO 5 MILLILITER(S): KIT at 12:14

## 2024-01-01 RX ADMIN — Medication 1 SPRAY(S): at 00:54

## 2024-01-01 RX ADMIN — PREDNISONE 5 MILLIGRAM(S): 20 TABLET ORAL at 12:18

## 2024-01-01 RX ADMIN — ACETAMINOPHEN 500MG 1000 MILLIGRAM(S): 500 TABLET, COATED ORAL at 06:15

## 2024-01-01 RX ADMIN — POVIDONE, POLYVINYL ALCOHOL 1 DROP(S): 20; 27 SOLUTION OPHTHALMIC at 23:58

## 2024-01-01 RX ADMIN — Medication 5 MILLILITER(S): at 23:52

## 2024-01-01 RX ADMIN — ACETAMINOPHEN 500MG 650 MILLIGRAM(S): 500 TABLET, COATED ORAL at 13:24

## 2024-01-01 RX ADMIN — POVIDONE, POLYVINYL ALCOHOL 1 DROP(S): 20; 27 SOLUTION OPHTHALMIC at 23:10

## 2024-01-01 RX ADMIN — Medication 7 UNIT(S): at 12:09

## 2024-01-01 RX ADMIN — PREDNISOLONE ACETATE 1 DROP(S): 1.2 SUSPENSION/ DROPS OPHTHALMIC at 18:08

## 2024-01-01 RX ADMIN — FENTANYL 1 PATCH: 12 PATCH, EXTENDED RELEASE TRANSDERMAL at 05:57

## 2024-01-01 RX ADMIN — Medication 5 MILLILITER(S): at 22:29

## 2024-01-01 RX ADMIN — DIPHENHYDRAMINE HYDROCHLORIDE AND LIDOCAINE HYDROCHLORIDE AND ALUMINUM HYDROXIDE AND MAGNESIUM HYDRO 5 MILLILITER(S): KIT at 13:16

## 2024-01-01 RX ADMIN — WITCH HAZEL 1 APPLICATION(S): 50 SOLUTION RECTAL at 17:31

## 2024-01-01 RX ADMIN — FENTANYL 1 PATCH: 12 PATCH, EXTENDED RELEASE TRANSDERMAL at 08:05

## 2024-01-01 RX ADMIN — DICLOFENAC SODIUM 2 GRAM(S): 20 SOLUTION TOPICAL at 00:54

## 2024-01-01 RX ADMIN — SODIUM CHLORIDE 125 MILLILITER(S): 9 INJECTION, SOLUTION INTRAMUSCULAR; INTRAVENOUS; SUBCUTANEOUS at 13:16

## 2024-01-01 RX ADMIN — Medication 125 MILLIGRAM(S): at 23:25

## 2024-01-01 RX ADMIN — TRIAMCINOLONE ACETONIDE 1 APPLICATION(S): 0.15 AEROSOL, SPRAY TOPICAL at 04:39

## 2024-01-01 RX ADMIN — Medication 50 MILLILITER(S): at 12:20

## 2024-01-01 RX ADMIN — POVIDONE, POLYVINYL ALCOHOL 1 DROP(S): 20; 27 SOLUTION OPHTHALMIC at 23:44

## 2024-01-01 RX ADMIN — OXYCODONE HYDROCHLORIDE 5 MILLIGRAM(S): 30 TABLET ORAL at 12:53

## 2024-01-01 RX ADMIN — IPRATROPIUM BROMIDE AND ALBUTEROL SULFATE 3 MILLILITER(S): 2.5; .5 SOLUTION RESPIRATORY (INHALATION) at 23:26

## 2024-01-01 RX ADMIN — DICLOFENAC SODIUM 2 GRAM(S): 20 SOLUTION TOPICAL at 05:40

## 2024-01-01 RX ADMIN — DICLOFENAC SODIUM 2 GRAM(S): 20 SOLUTION TOPICAL at 05:49

## 2024-01-01 RX ADMIN — POVIDONE, POLYVINYL ALCOHOL 1 DROP(S): 20; 27 SOLUTION OPHTHALMIC at 11:48

## 2024-01-01 RX ADMIN — PANTOPRAZOLE SODIUM 40 MILLIGRAM(S): 40 TABLET, DELAYED RELEASE ORAL at 05:12

## 2024-01-01 RX ADMIN — WITCH HAZEL 1 APPLICATION(S): 50 SOLUTION RECTAL at 17:23

## 2024-01-01 RX ADMIN — Medication 2 TABLET(S): at 09:20

## 2024-01-01 RX ADMIN — LIDOCAINE 4 PATCH: 40 CREAM TOPICAL at 19:00

## 2024-01-01 RX ADMIN — POVIDONE, POLYVINYL ALCOHOL 1 DROP(S): 20; 27 SOLUTION OPHTHALMIC at 11:23

## 2024-01-01 RX ADMIN — LIDOCAINE 4 PATCH: 40 CREAM TOPICAL at 19:55

## 2024-01-01 RX ADMIN — SODIUM CHLORIDE 1 GRAM(S): 9 INJECTION, SOLUTION INTRAMUSCULAR; INTRAVENOUS; SUBCUTANEOUS at 22:03

## 2024-01-01 RX ADMIN — IPRATROPIUM BROMIDE AND ALBUTEROL SULFATE 3 MILLILITER(S): 2.5; .5 SOLUTION RESPIRATORY (INHALATION) at 23:17

## 2024-01-01 RX ADMIN — Medication 1 APPLICATION(S): at 22:46

## 2024-01-01 RX ADMIN — Medication 1 APPLICATION(S): at 21:51

## 2024-01-01 RX ADMIN — POVIDONE, POLYVINYL ALCOHOL 1 DROP(S): 20; 27 SOLUTION OPHTHALMIC at 23:55

## 2024-01-01 RX ADMIN — LIDOCAINE 1 PATCH: 40 CREAM TOPICAL at 07:09

## 2024-01-01 RX ADMIN — POVIDONE, POLYVINYL ALCOHOL 1 DROP(S): 20; 27 SOLUTION OPHTHALMIC at 12:20

## 2024-01-01 RX ADMIN — ACETAMINOPHEN, DIPHENHYDRAMINE HCL, PHENYLEPHRINE HCL 12 MILLIGRAM(S): 325; 25; 5 TABLET ORAL at 21:10

## 2024-01-01 RX ADMIN — IPRATROPIUM BROMIDE AND ALBUTEROL SULFATE 3 MILLILITER(S): 2.5; .5 SOLUTION RESPIRATORY (INHALATION) at 23:00

## 2024-01-01 RX ADMIN — NYSTATIN 1 APPLICATION(S): 100000 POWDER TOPICAL at 05:23

## 2024-01-01 RX ADMIN — MEROPENEM 100 MILLIGRAM(S): 500 INJECTION, POWDER, FOR SOLUTION INTRAVENOUS at 13:20

## 2024-01-01 RX ADMIN — Medication 1 TABLET(S): at 11:29

## 2024-01-01 RX ADMIN — HYDRALAZINE HYDROCHLORIDE 10 MILLIGRAM(S): 10 TABLET ORAL at 14:36

## 2024-01-01 RX ADMIN — LIDOCAINE 4 PATCH: 40 CREAM TOPICAL at 17:26

## 2024-01-01 RX ADMIN — FENTANYL 1 PATCH: 12 PATCH, EXTENDED RELEASE TRANSDERMAL at 23:36

## 2024-01-01 RX ADMIN — POVIDONE, POLYVINYL ALCOHOL 1 DROP(S): 20; 27 SOLUTION OPHTHALMIC at 17:39

## 2024-01-01 RX ADMIN — Medication 2: at 11:52

## 2024-01-01 RX ADMIN — PANTOPRAZOLE SODIUM 40 MILLIGRAM(S): 40 TABLET, DELAYED RELEASE ORAL at 17:54

## 2024-01-01 RX ADMIN — Medication 5 MILLILITER(S): at 23:55

## 2024-01-01 RX ADMIN — SODIUM CHLORIDE 1 GRAM(S): 9 INJECTION, SOLUTION INTRAMUSCULAR; INTRAVENOUS; SUBCUTANEOUS at 05:42

## 2024-01-01 RX ADMIN — LIDOCAINE 4 PATCH: 40 CREAM TOPICAL at 08:01

## 2024-01-01 RX ADMIN — HYDRALAZINE HYDROCHLORIDE 10 MILLIGRAM(S): 10 TABLET ORAL at 05:41

## 2024-01-01 RX ADMIN — NYSTATIN 1 APPLICATION(S): 100000 POWDER TOPICAL at 14:26

## 2024-01-01 RX ADMIN — DICLOFENAC SODIUM 2 GRAM(S): 20 SOLUTION TOPICAL at 05:17

## 2024-01-01 RX ADMIN — CHLORHEXIDINE GLUCONATE 1 APPLICATION(S): 1.2 RINSE ORAL at 22:02

## 2024-01-01 RX ADMIN — Medication 8: at 11:41

## 2024-01-01 RX ADMIN — SODIUM CHLORIDE 1 GRAM(S): 9 INJECTION, SOLUTION INTRAMUSCULAR; INTRAVENOUS; SUBCUTANEOUS at 06:48

## 2024-01-01 RX ADMIN — CHLORHEXIDINE GLUCONATE 15 MILLILITER(S): 1.2 RINSE ORAL at 18:00

## 2024-01-01 RX ADMIN — ACETAMINOPHEN 500MG 650 MILLIGRAM(S): 500 TABLET, COATED ORAL at 12:08

## 2024-01-01 RX ADMIN — HYDRALAZINE HYDROCHLORIDE 10 MILLIGRAM(S): 10 TABLET ORAL at 13:35

## 2024-01-01 RX ADMIN — DIPHENHYDRAMINE HYDROCHLORIDE AND LIDOCAINE HYDROCHLORIDE AND ALUMINUM HYDROXIDE AND MAGNESIUM HYDRO 5 MILLILITER(S): KIT at 13:55

## 2024-01-01 RX ADMIN — POVIDONE, POLYVINYL ALCOHOL 1 DROP(S): 20; 27 SOLUTION OPHTHALMIC at 12:23

## 2024-01-01 RX ADMIN — NYSTATIN 1 APPLICATION(S): 100000 POWDER TOPICAL at 22:04

## 2024-01-01 RX ADMIN — ESCITALOPRAM OXALATE 10 MILLIGRAM(S): 10 TABLET, FILM COATED ORAL at 17:37

## 2024-01-01 RX ADMIN — Medication 1 SPRAY(S): at 18:22

## 2024-01-01 RX ADMIN — TRIAMCINOLONE ACETONIDE 1 APPLICATION(S): 0.15 AEROSOL, SPRAY TOPICAL at 05:13

## 2024-01-01 RX ADMIN — CHLORHEXIDINE GLUCONATE 1 APPLICATION(S): 1.2 RINSE ORAL at 12:15

## 2024-01-01 RX ADMIN — Medication 300 MILLIGRAM(S): at 23:03

## 2024-01-01 RX ADMIN — CHLORHEXIDINE GLUCONATE 15 MILLILITER(S): 1.2 RINSE ORAL at 06:09

## 2024-01-01 RX ADMIN — IPRATROPIUM BROMIDE AND ALBUTEROL SULFATE 3 MILLILITER(S): 2.5; .5 SOLUTION RESPIRATORY (INHALATION) at 23:05

## 2024-01-01 RX ADMIN — Medication 125 MILLIGRAM(S): at 23:58

## 2024-01-01 RX ADMIN — Medication 500 MILLIGRAM(S): at 11:01

## 2024-01-01 RX ADMIN — WITCH HAZEL 1 APPLICATION(S): 50 SOLUTION RECTAL at 17:39

## 2024-01-01 RX ADMIN — Medication 125 MILLIGRAM(S): at 18:21

## 2024-01-01 RX ADMIN — Medication 5 MILLILITER(S): at 05:48

## 2024-01-01 RX ADMIN — DICLOFENAC SODIUM 2 GRAM(S): 20 SOLUTION TOPICAL at 17:38

## 2024-01-01 RX ADMIN — DICLOFENAC SODIUM 2 GRAM(S): 20 SOLUTION TOPICAL at 00:00

## 2024-01-01 RX ADMIN — HYDRALAZINE HYDROCHLORIDE 10 MILLIGRAM(S): 10 TABLET ORAL at 04:40

## 2024-01-01 RX ADMIN — NYSTATIN 1 APPLICATION(S): 100000 POWDER TOPICAL at 22:05

## 2024-01-01 RX ADMIN — Medication 125 MILLIGRAM(S): at 17:38

## 2024-01-01 RX ADMIN — POVIDONE, POLYVINYL ALCOHOL 1 DROP(S): 20; 27 SOLUTION OPHTHALMIC at 06:10

## 2024-01-01 RX ADMIN — POVIDONE, POLYVINYL ALCOHOL 1 DROP(S): 20; 27 SOLUTION OPHTHALMIC at 17:51

## 2024-01-01 RX ADMIN — Medication 1 SPRAY(S): at 05:14

## 2024-01-01 RX ADMIN — PREDNISOLONE ACETATE 1 DROP(S): 1.2 SUSPENSION/ DROPS OPHTHALMIC at 23:53

## 2024-01-01 RX ADMIN — CEFTOLOZANE AND TAZOBACTAM 33.33 MILLIGRAM(S): 1; .5 INJECTION, POWDER, LYOPHILIZED, FOR SOLUTION INTRAVENOUS at 13:59

## 2024-01-01 RX ADMIN — IPRATROPIUM BROMIDE AND ALBUTEROL SULFATE 3 MILLILITER(S): 2.5; .5 SOLUTION RESPIRATORY (INHALATION) at 18:13

## 2024-01-01 RX ADMIN — Medication 1 SPRAY(S): at 11:19

## 2024-01-01 RX ADMIN — Medication 1 SPRAY(S): at 06:48

## 2024-01-01 RX ADMIN — LIDOCAINE 4 PATCH: 40 CREAM TOPICAL at 06:36

## 2024-01-01 RX ADMIN — IPRATROPIUM BROMIDE AND ALBUTEROL SULFATE 3 MILLILITER(S): 2.5; .5 SOLUTION RESPIRATORY (INHALATION) at 06:14

## 2024-01-01 RX ADMIN — SODIUM CHLORIDE 1 GRAM(S): 9 INJECTION, SOLUTION INTRAMUSCULAR; INTRAVENOUS; SUBCUTANEOUS at 05:14

## 2024-01-01 RX ADMIN — Medication 125 MICROGRAM(S): at 04:38

## 2024-01-01 RX ADMIN — MIDODRINE HYDROCHLORIDE 10 MILLIGRAM(S): 5 TABLET ORAL at 22:56

## 2024-01-01 RX ADMIN — DICLOFENAC SODIUM 2 GRAM(S): 20 SOLUTION TOPICAL at 02:08

## 2024-01-01 RX ADMIN — Medication 1 SPRAY(S): at 11:27

## 2024-01-01 RX ADMIN — Medication 22 UNIT(S): at 05:57

## 2024-01-01 RX ADMIN — POVIDONE, POLYVINYL ALCOHOL 1 DROP(S): 20; 27 SOLUTION OPHTHALMIC at 17:53

## 2024-01-01 RX ADMIN — POVIDONE, POLYVINYL ALCOHOL 1 DROP(S): 20; 27 SOLUTION OPHTHALMIC at 05:35

## 2024-01-01 RX ADMIN — Medication 125 MILLIGRAM(S): at 05:44

## 2024-01-01 RX ADMIN — IPRATROPIUM BROMIDE AND ALBUTEROL SULFATE 3 MILLILITER(S): 2.5; .5 SOLUTION RESPIRATORY (INHALATION) at 17:16

## 2024-01-01 RX ADMIN — CHLORHEXIDINE GLUCONATE 1 APPLICATION(S): 1.2 RINSE ORAL at 21:49

## 2024-01-01 RX ADMIN — PANTOPRAZOLE SODIUM 40 MILLIGRAM(S): 40 TABLET, DELAYED RELEASE ORAL at 06:11

## 2024-01-01 RX ADMIN — IPRATROPIUM BROMIDE AND ALBUTEROL SULFATE 3 MILLILITER(S): 2.5; .5 SOLUTION RESPIRATORY (INHALATION) at 17:28

## 2024-01-01 RX ADMIN — LIDOCAINE 5 MILLILITER(S): 4 CREAM TOPICAL at 05:12

## 2024-01-01 RX ADMIN — Medication 125 MICROGRAM(S): at 06:18

## 2024-01-01 RX ADMIN — Medication 1 TABLET(S): at 11:33

## 2024-01-01 RX ADMIN — Medication 5 MILLILITER(S): at 06:40

## 2024-01-01 RX ADMIN — DICLOFENAC SODIUM 2 GRAM(S): 20 SOLUTION TOPICAL at 12:17

## 2024-01-01 RX ADMIN — Medication 1 SPRAY(S): at 23:07

## 2024-01-01 RX ADMIN — POVIDONE, POLYVINYL ALCOHOL 1 DROP(S): 20; 27 SOLUTION OPHTHALMIC at 05:13

## 2024-01-01 RX ADMIN — Medication 1 SPRAY(S): at 05:57

## 2024-01-01 RX ADMIN — Medication 25 MILLIGRAM(S): at 05:32

## 2024-01-01 RX ADMIN — FUROSEMIDE 20 MILLIGRAM(S): 40 TABLET ORAL at 12:17

## 2024-01-01 RX ADMIN — PANTOPRAZOLE SODIUM 40 MILLIGRAM(S): 40 TABLET, DELAYED RELEASE ORAL at 05:33

## 2024-01-01 RX ADMIN — ACETAMINOPHEN 500MG 650 MILLIGRAM(S): 500 TABLET, COATED ORAL at 21:50

## 2024-01-01 RX ADMIN — IPRATROPIUM BROMIDE AND ALBUTEROL SULFATE 3 MILLILITER(S): 2.5; .5 SOLUTION RESPIRATORY (INHALATION) at 23:24

## 2024-01-01 RX ADMIN — SODIUM CHLORIDE 1 GRAM(S): 9 INJECTION, SOLUTION INTRAMUSCULAR; INTRAVENOUS; SUBCUTANEOUS at 21:10

## 2024-01-01 RX ADMIN — NYSTATIN 1 APPLICATION(S): 100000 POWDER TOPICAL at 05:52

## 2024-01-01 RX ADMIN — ESCITALOPRAM OXALATE 10 MILLIGRAM(S): 10 TABLET, FILM COATED ORAL at 18:06

## 2024-01-01 RX ADMIN — IPRATROPIUM BROMIDE AND ALBUTEROL SULFATE 3 MILLILITER(S): 2.5; .5 SOLUTION RESPIRATORY (INHALATION) at 23:19

## 2024-01-01 RX ADMIN — CHLORHEXIDINE GLUCONATE 15 MILLILITER(S): 1.2 RINSE ORAL at 06:10

## 2024-01-01 RX ADMIN — SODIUM CHLORIDE 1 GRAM(S): 9 INJECTION, SOLUTION INTRAMUSCULAR; INTRAVENOUS; SUBCUTANEOUS at 13:12

## 2024-01-01 RX ADMIN — POVIDONE, POLYVINYL ALCOHOL 1 DROP(S): 20; 27 SOLUTION OPHTHALMIC at 17:06

## 2024-01-01 RX ADMIN — OXYCODONE HYDROCHLORIDE 2.5 MILLIGRAM(S): 30 TABLET ORAL at 13:35

## 2024-01-01 RX ADMIN — Medication 1: at 23:51

## 2024-01-01 RX ADMIN — DICLOFENAC SODIUM 2 GRAM(S): 20 SOLUTION TOPICAL at 17:22

## 2024-01-01 RX ADMIN — GABAPENTIN 250 MILLIGRAM(S): 300 CAPSULE ORAL at 22:27

## 2024-01-01 RX ADMIN — LIDOCAINE 4 PATCH: 40 CREAM TOPICAL at 03:41

## 2024-01-01 RX ADMIN — ACETAMINOPHEN 500MG 650 MILLIGRAM(S): 500 TABLET, COATED ORAL at 14:15

## 2024-01-01 RX ADMIN — FENTANYL 1 PATCH: 12 PATCH, EXTENDED RELEASE TRANSDERMAL at 07:52

## 2024-01-01 RX ADMIN — IPRATROPIUM BROMIDE AND ALBUTEROL SULFATE 3 MILLILITER(S): 2.5; .5 SOLUTION RESPIRATORY (INHALATION) at 23:13

## 2024-01-01 RX ADMIN — DICLOFENAC SODIUM 2 GRAM(S): 20 SOLUTION TOPICAL at 18:07

## 2024-01-01 RX ADMIN — NYSTATIN 1 APPLICATION(S): 100000 POWDER TOPICAL at 06:16

## 2024-01-01 RX ADMIN — PREDNISOLONE ACETATE 1 DROP(S): 1.2 SUSPENSION/ DROPS OPHTHALMIC at 05:14

## 2024-01-01 RX ADMIN — Medication 2: at 12:01

## 2024-01-01 RX ADMIN — Medication 1 SPRAY(S): at 23:28

## 2024-01-01 RX ADMIN — IPRATROPIUM BROMIDE AND ALBUTEROL SULFATE 3 MILLILITER(S): 2.5; .5 SOLUTION RESPIRATORY (INHALATION) at 11:02

## 2024-01-01 RX ADMIN — ACETAMINOPHEN, DIPHENHYDRAMINE HCL, PHENYLEPHRINE HCL 12 MILLIGRAM(S): 325; 25; 5 TABLET ORAL at 21:15

## 2024-01-01 RX ADMIN — WITCH HAZEL 1 APPLICATION(S): 50 SOLUTION RECTAL at 17:43

## 2024-01-01 RX ADMIN — CHLORHEXIDINE GLUCONATE 15 MILLILITER(S): 1.2 RINSE ORAL at 17:38

## 2024-01-01 RX ADMIN — LIDOCAINE 4 PATCH: 40 CREAM TOPICAL at 15:48

## 2024-01-01 RX ADMIN — NYSTATIN CREAM 1 APPLICATION(S): 100000 CREAM TOPICAL at 14:00

## 2024-01-01 RX ADMIN — Medication 5 MILLILITER(S): at 17:09

## 2024-01-01 RX ADMIN — SODIUM CHLORIDE 1 GRAM(S): 9 INJECTION, SOLUTION INTRAMUSCULAR; INTRAVENOUS; SUBCUTANEOUS at 22:55

## 2024-01-01 RX ADMIN — Medication 125 MILLIGRAM(S): at 17:22

## 2024-01-01 RX ADMIN — ACETAMINOPHEN 500MG 400 MILLIGRAM(S): 500 TABLET, COATED ORAL at 23:50

## 2024-01-01 RX ADMIN — ACETAMINOPHEN 500MG 650 MILLIGRAM(S): 500 TABLET, COATED ORAL at 22:30

## 2024-01-01 RX ADMIN — Medication 2: at 06:17

## 2024-01-01 RX ADMIN — Medication 5 MILLILITER(S): at 06:17

## 2024-01-01 RX ADMIN — PREDNISONE 5 MILLIGRAM(S): 20 TABLET ORAL at 08:52

## 2024-01-01 RX ADMIN — POVIDONE, POLYVINYL ALCOHOL 1 DROP(S): 20; 27 SOLUTION OPHTHALMIC at 11:06

## 2024-01-01 RX ADMIN — Medication 5 MILLILITER(S): at 12:10

## 2024-01-01 RX ADMIN — ACETAMINOPHEN 500MG 650 MILLIGRAM(S): 500 TABLET, COATED ORAL at 00:14

## 2024-01-01 RX ADMIN — Medication 125 MILLIGRAM(S): at 06:03

## 2024-01-01 RX ADMIN — Medication 4 MILLILITER(S): at 17:56

## 2024-01-01 RX ADMIN — CEFEPIME 100 MILLIGRAM(S): 2 INJECTION, POWDER, FOR SOLUTION INTRAVENOUS at 05:55

## 2024-01-01 RX ADMIN — Medication 1 TABLET(S): at 12:19

## 2024-01-01 RX ADMIN — Medication 1 SPRAY(S): at 00:20

## 2024-01-01 RX ADMIN — GABAPENTIN 250 MILLIGRAM(S): 300 CAPSULE ORAL at 22:55

## 2024-01-01 RX ADMIN — AMLODIPINE BESYLATE 10 MILLIGRAM(S): 10 TABLET ORAL at 08:58

## 2024-01-01 RX ADMIN — NYSTATIN 1 APPLICATION(S): 100000 POWDER TOPICAL at 14:30

## 2024-01-01 RX ADMIN — Medication 32 UNIT(S): at 12:19

## 2024-01-01 RX ADMIN — Medication 125 MILLIGRAM(S): at 21:24

## 2024-01-01 RX ADMIN — GABAPENTIN 250 MILLIGRAM(S): 300 CAPSULE ORAL at 21:39

## 2024-01-01 RX ADMIN — LIDOCAINE 4 PATCH: 40 CREAM TOPICAL at 06:43

## 2024-01-01 RX ADMIN — Medication 25 GRAM(S): at 11:44

## 2024-01-01 RX ADMIN — Medication 1 TABLET(S): at 10:58

## 2024-01-01 RX ADMIN — Medication 125 MICROGRAM(S): at 05:04

## 2024-01-01 RX ADMIN — Medication 14 UNIT(S): at 05:46

## 2024-01-01 RX ADMIN — Medication 1 SPRAY(S): at 05:42

## 2024-01-01 RX ADMIN — ACETAMINOPHEN, DIPHENHYDRAMINE HCL, PHENYLEPHRINE HCL 12 MILLIGRAM(S): 325; 25; 5 TABLET ORAL at 21:32

## 2024-01-01 RX ADMIN — KETOROLAC TROMETHAMINE 15 MILLIGRAM(S): 30 INJECTION INTRAMUSCULAR; INTRAVENOUS at 11:35

## 2024-01-01 RX ADMIN — Medication 12.5 MILLIGRAM(S): at 14:19

## 2024-01-01 RX ADMIN — POVIDONE, POLYVINYL ALCOHOL 1 DROP(S): 20; 27 SOLUTION OPHTHALMIC at 17:38

## 2024-01-01 RX ADMIN — IPRATROPIUM BROMIDE AND ALBUTEROL SULFATE 3 MILLILITER(S): 2.5; .5 SOLUTION RESPIRATORY (INHALATION) at 05:29

## 2024-01-01 RX ADMIN — Medication 5 MILLILITER(S): at 00:19

## 2024-01-01 RX ADMIN — ESCITALOPRAM OXALATE 10 MILLIGRAM(S): 10 TABLET, FILM COATED ORAL at 17:40

## 2024-01-01 RX ADMIN — Medication 1 SPRAY(S): at 11:02

## 2024-01-01 RX ADMIN — Medication 1: at 23:53

## 2024-01-01 RX ADMIN — SODIUM CHLORIDE 1 GRAM(S): 9 INJECTION, SOLUTION INTRAMUSCULAR; INTRAVENOUS; SUBCUTANEOUS at 05:47

## 2024-01-01 RX ADMIN — Medication 1 SPRAY(S): at 00:05

## 2024-01-01 RX ADMIN — IPRATROPIUM BROMIDE AND ALBUTEROL SULFATE 3 MILLILITER(S): 2.5; .5 SOLUTION RESPIRATORY (INHALATION) at 17:55

## 2024-01-01 RX ADMIN — LIDOCAINE 1 PATCH: 40 CREAM TOPICAL at 17:00

## 2024-01-01 RX ADMIN — Medication 5 MILLILITER(S): at 05:44

## 2024-01-01 RX ADMIN — Medication 1 SPRAY(S): at 18:52

## 2024-01-01 RX ADMIN — INSULIN GLARGINE 10 UNIT(S): 100 INJECTION, SOLUTION SUBCUTANEOUS at 05:57

## 2024-01-01 RX ADMIN — HYDRALAZINE HYDROCHLORIDE 10 MILLIGRAM(S): 10 TABLET ORAL at 22:00

## 2024-01-01 RX ADMIN — CHLORHEXIDINE GLUCONATE 1 APPLICATION(S): 1.2 RINSE ORAL at 12:31

## 2024-01-01 RX ADMIN — IPRATROPIUM BROMIDE AND ALBUTEROL SULFATE 3 MILLILITER(S): 2.5; .5 SOLUTION RESPIRATORY (INHALATION) at 12:30

## 2024-01-01 RX ADMIN — Medication 1 SPRAY(S): at 05:17

## 2024-01-01 RX ADMIN — FENTANYL 1 PATCH: 12 PATCH, EXTENDED RELEASE TRANSDERMAL at 10:23

## 2024-01-01 RX ADMIN — IPRATROPIUM BROMIDE AND ALBUTEROL SULFATE 3 MILLILITER(S): 2.5; .5 SOLUTION RESPIRATORY (INHALATION) at 23:45

## 2024-01-01 RX ADMIN — DICLOFENAC SODIUM 2 GRAM(S): 20 SOLUTION TOPICAL at 18:25

## 2024-01-01 RX ADMIN — AMLODIPINE BESYLATE 10 MILLIGRAM(S): 10 TABLET ORAL at 05:18

## 2024-01-01 RX ADMIN — Medication 125 MICROGRAM(S): at 05:42

## 2024-01-01 RX ADMIN — POVIDONE, POLYVINYL ALCOHOL 1 DROP(S): 20; 27 SOLUTION OPHTHALMIC at 22:30

## 2024-01-01 RX ADMIN — ACETAMINOPHEN, DIPHENHYDRAMINE HCL, PHENYLEPHRINE HCL 12 MILLIGRAM(S): 325; 25; 5 TABLET ORAL at 21:58

## 2024-01-01 RX ADMIN — Medication 1 SPRAY(S): at 17:21

## 2024-01-01 RX ADMIN — TRIAMCINOLONE ACETONIDE 1 APPLICATION(S): 0.15 AEROSOL, SPRAY TOPICAL at 21:49

## 2024-01-01 RX ADMIN — LIDOCAINE 4 PATCH: 40 CREAM TOPICAL at 06:32

## 2024-01-01 RX ADMIN — POVIDONE, POLYVINYL ALCOHOL 1 DROP(S): 20; 27 SOLUTION OPHTHALMIC at 12:09

## 2024-01-01 RX ADMIN — GABAPENTIN 250 MILLIGRAM(S): 300 CAPSULE ORAL at 22:28

## 2024-01-01 RX ADMIN — Medication 125 MILLIGRAM(S): at 05:22

## 2024-01-01 RX ADMIN — Medication 2 MILLIGRAM(S): at 14:05

## 2024-01-01 RX ADMIN — PREDNISONE 5 MILLIGRAM(S): 20 TABLET ORAL at 09:20

## 2024-01-01 RX ADMIN — Medication 125 MICROGRAM(S): at 05:24

## 2024-01-01 RX ADMIN — Medication 5 MILLILITER(S): at 05:38

## 2024-01-01 RX ADMIN — FENTANYL 1 PATCH: 12 PATCH, EXTENDED RELEASE TRANSDERMAL at 19:21

## 2024-01-01 RX ADMIN — Medication 4: at 06:37

## 2024-01-01 RX ADMIN — ACETAMINOPHEN 500MG 500 MILLIGRAM(S): 500 TABLET, COATED ORAL at 05:41

## 2024-01-01 RX ADMIN — Medication 1 SPRAY(S): at 06:40

## 2024-01-01 RX ADMIN — Medication 70 MILLILITER(S): at 18:09

## 2024-01-01 RX ADMIN — POVIDONE, POLYVINYL ALCOHOL 1 DROP(S): 20; 27 SOLUTION OPHTHALMIC at 12:08

## 2024-01-01 RX ADMIN — WITCH HAZEL 1 APPLICATION(S): 50 SOLUTION RECTAL at 05:53

## 2024-01-01 RX ADMIN — IPRATROPIUM BROMIDE AND ALBUTEROL SULFATE 3 MILLILITER(S): 2.5; .5 SOLUTION RESPIRATORY (INHALATION) at 05:45

## 2024-01-01 RX ADMIN — FENTANYL 1 PATCH: 12 PATCH, EXTENDED RELEASE TRANSDERMAL at 11:52

## 2024-01-01 RX ADMIN — HYDROMORPHONE HYDROCHLORIDE 1 MILLIGRAM(S): 2 TABLET ORAL at 11:55

## 2024-01-01 RX ADMIN — POVIDONE, POLYVINYL ALCOHOL 1 DROP(S): 20; 27 SOLUTION OPHTHALMIC at 23:52

## 2024-01-01 RX ADMIN — Medication 50 MILLILITER(S): at 22:25

## 2024-01-01 RX ADMIN — FENTANYL 1 PATCH: 12 PATCH, EXTENDED RELEASE TRANSDERMAL at 19:48

## 2024-01-01 RX ADMIN — Medication 5 MILLILITER(S): at 12:30

## 2024-01-01 RX ADMIN — INSULIN GLARGINE 10 UNIT(S): 100 INJECTION, SOLUTION SUBCUTANEOUS at 06:24

## 2024-01-01 RX ADMIN — ACETAMINOPHEN 500MG 650 MILLIGRAM(S): 500 TABLET, COATED ORAL at 21:14

## 2024-01-01 RX ADMIN — MEROPENEM 100 MILLIGRAM(S): 500 INJECTION, POWDER, FOR SOLUTION INTRAVENOUS at 05:15

## 2024-01-01 RX ADMIN — TRIAMCINOLONE ACETONIDE 1 APPLICATION(S): 0.15 AEROSOL, SPRAY TOPICAL at 13:40

## 2024-01-01 RX ADMIN — IPRATROPIUM BROMIDE AND ALBUTEROL SULFATE 3 MILLILITER(S): 2.5; .5 SOLUTION RESPIRATORY (INHALATION) at 05:44

## 2024-01-01 RX ADMIN — Medication 25 MILLIGRAM(S): at 13:59

## 2024-01-01 RX ADMIN — DICLOFENAC SODIUM 2 GRAM(S): 20 SOLUTION TOPICAL at 05:14

## 2024-01-01 RX ADMIN — OXYCODONE HYDROCHLORIDE 2.5 MILLIGRAM(S): 30 TABLET ORAL at 16:14

## 2024-01-01 RX ADMIN — SODIUM CHLORIDE 1 GRAM(S): 9 INJECTION, SOLUTION INTRAMUSCULAR; INTRAVENOUS; SUBCUTANEOUS at 21:57

## 2024-01-01 RX ADMIN — FENTANYL 1 PATCH: 12 PATCH, EXTENDED RELEASE TRANSDERMAL at 07:10

## 2024-01-01 RX ADMIN — FENTANYL 1 PATCH: 12 PATCH, EXTENDED RELEASE TRANSDERMAL at 07:18

## 2024-01-01 RX ADMIN — NYSTATIN 1 APPLICATION(S): 100000 POWDER TOPICAL at 05:16

## 2024-01-01 RX ADMIN — Medication 1 APPLICATION(S): at 22:12

## 2024-01-01 RX ADMIN — PREDNISOLONE ACETATE 1 DROP(S): 1.2 SUSPENSION/ DROPS OPHTHALMIC at 17:21

## 2024-01-01 RX ADMIN — POVIDONE, POLYVINYL ALCOHOL 1 DROP(S): 20; 27 SOLUTION OPHTHALMIC at 05:55

## 2024-01-01 RX ADMIN — PREDNISOLONE ACETATE 1 DROP(S): 1.2 SUSPENSION/ DROPS OPHTHALMIC at 17:20

## 2024-01-01 RX ADMIN — CHLORHEXIDINE GLUCONATE 15 MILLILITER(S): 1.2 RINSE ORAL at 17:57

## 2024-01-01 RX ADMIN — DIPHENHYDRAMINE HYDROCHLORIDE AND LIDOCAINE HYDROCHLORIDE AND ALUMINUM HYDROXIDE AND MAGNESIUM HYDRO 5 MILLILITER(S): KIT at 05:43

## 2024-01-01 RX ADMIN — SODIUM CHLORIDE 1 GRAM(S): 9 INJECTION, SOLUTION INTRAMUSCULAR; INTRAVENOUS; SUBCUTANEOUS at 05:43

## 2024-01-01 RX ADMIN — WITCH HAZEL 1 APPLICATION(S): 50 SOLUTION RECTAL at 05:17

## 2024-01-01 RX ADMIN — IPRATROPIUM BROMIDE AND ALBUTEROL SULFATE 3 MILLILITER(S): 2.5; .5 SOLUTION RESPIRATORY (INHALATION) at 17:57

## 2024-01-01 RX ADMIN — Medication 5 MILLILITER(S): at 22:06

## 2024-01-01 RX ADMIN — Medication 5 MILLILITER(S): at 23:09

## 2024-01-01 RX ADMIN — Medication 1 SPRAY(S): at 16:55

## 2024-01-01 RX ADMIN — Medication 2 MILLIGRAM(S): at 05:57

## 2024-01-01 RX ADMIN — CHLORHEXIDINE GLUCONATE 15 MILLILITER(S): 1.2 RINSE ORAL at 17:21

## 2024-01-01 RX ADMIN — Medication 0: at 00:38

## 2024-01-01 RX ADMIN — PREDNISONE 5 MILLIGRAM(S): 20 TABLET ORAL at 08:45

## 2024-01-01 RX ADMIN — WITCH HAZEL 1 APPLICATION(S): 50 SOLUTION RECTAL at 17:13

## 2024-01-01 RX ADMIN — FENTANYL 1 PATCH: 12 PATCH, EXTENDED RELEASE TRANSDERMAL at 23:58

## 2024-01-01 RX ADMIN — Medication 3: at 11:25

## 2024-01-01 RX ADMIN — DICLOFENAC SODIUM 2 GRAM(S): 20 SOLUTION TOPICAL at 23:38

## 2024-01-01 RX ADMIN — Medication 12.5 MILLIGRAM(S): at 15:43

## 2024-01-01 RX ADMIN — PANTOPRAZOLE SODIUM 40 MILLIGRAM(S): 40 TABLET, DELAYED RELEASE ORAL at 05:44

## 2024-01-01 RX ADMIN — DICLOFENAC SODIUM 2 GRAM(S): 20 SOLUTION TOPICAL at 05:51

## 2024-01-01 RX ADMIN — Medication 20 UNIT(S): at 06:57

## 2024-01-01 RX ADMIN — ACETAMINOPHEN, DIPHENHYDRAMINE HCL, PHENYLEPHRINE HCL 12 MILLIGRAM(S): 325; 25; 5 TABLET ORAL at 22:54

## 2024-01-01 RX ADMIN — WITCH HAZEL 1 APPLICATION(S): 50 SOLUTION RECTAL at 05:18

## 2024-01-01 RX ADMIN — POTASSIUM PHOSPHATE, MONOBASIC POTASSIUM PHOSPHATE, DIBASIC INJECTION, 62.5 MILLIMOLE(S): 236; 224 SOLUTION, CONCENTRATE INTRAVENOUS at 16:03

## 2024-01-01 RX ADMIN — SODIUM CHLORIDE 1 GRAM(S): 9 INJECTION, SOLUTION INTRAMUSCULAR; INTRAVENOUS; SUBCUTANEOUS at 17:23

## 2024-01-01 RX ADMIN — Medication 1 APPLICATION(S): at 22:31

## 2024-01-01 RX ADMIN — DIPHENHYDRAMINE HYDROCHLORIDE AND LIDOCAINE HYDROCHLORIDE AND ALUMINUM HYDROXIDE AND MAGNESIUM HYDRO 5 MILLILITER(S): KIT at 13:36

## 2024-01-01 RX ADMIN — POVIDONE, POLYVINYL ALCOHOL 1 DROP(S): 20; 27 SOLUTION OPHTHALMIC at 17:25

## 2024-01-01 RX ADMIN — DICLOFENAC SODIUM 2 GRAM(S): 20 SOLUTION TOPICAL at 17:13

## 2024-01-01 RX ADMIN — IPRATROPIUM BROMIDE AND ALBUTEROL SULFATE 3 MILLILITER(S): 2.5; .5 SOLUTION RESPIRATORY (INHALATION) at 23:18

## 2024-01-01 RX ADMIN — NYSTATIN 1 APPLICATION(S): 100000 POWDER TOPICAL at 13:51

## 2024-01-01 RX ADMIN — Medication 12.5 MILLIGRAM(S): at 14:34

## 2024-01-01 RX ADMIN — PANTOPRAZOLE SODIUM 40 MILLIGRAM(S): 40 TABLET, DELAYED RELEASE ORAL at 17:22

## 2024-01-01 RX ADMIN — Medication 6: at 18:18

## 2024-01-01 RX ADMIN — WITCH HAZEL 1 APPLICATION(S): 50 SOLUTION RECTAL at 05:03

## 2024-01-01 RX ADMIN — PREDNISONE 5 MILLIGRAM(S): 20 TABLET ORAL at 08:31

## 2024-01-01 RX ADMIN — Medication 5 MILLILITER(S): at 23:49

## 2024-01-01 RX ADMIN — PREDNISONE 5 MILLIGRAM(S): 20 TABLET ORAL at 11:42

## 2024-01-01 RX ADMIN — Medication 0: at 06:56

## 2024-01-01 RX ADMIN — CHLORHEXIDINE GLUCONATE 15 MILLILITER(S): 1.2 RINSE ORAL at 17:52

## 2024-01-01 RX ADMIN — CHLORHEXIDINE GLUCONATE 15 MILLILITER(S): 1.2 RINSE ORAL at 05:34

## 2024-01-01 RX ADMIN — Medication 1 SPRAY(S): at 13:09

## 2024-01-01 RX ADMIN — CHLORHEXIDINE GLUCONATE 15 MILLILITER(S): 1.2 RINSE ORAL at 17:42

## 2024-01-01 RX ADMIN — LIDOCAINE 4 PATCH: 40 CREAM TOPICAL at 16:00

## 2024-01-01 RX ADMIN — FENTANYL 1 PATCH: 12 PATCH, EXTENDED RELEASE TRANSDERMAL at 08:56

## 2024-01-01 RX ADMIN — IPRATROPIUM BROMIDE AND ALBUTEROL SULFATE 3 MILLILITER(S): 2.5; .5 SOLUTION RESPIRATORY (INHALATION) at 23:30

## 2024-01-01 RX ADMIN — Medication 1 SPRAY(S): at 06:35

## 2024-01-01 RX ADMIN — Medication 160 MILLIGRAM(S): at 10:28

## 2024-01-01 RX ADMIN — CEFEPIME 100 MILLIGRAM(S): 2 INJECTION, POWDER, FOR SOLUTION INTRAVENOUS at 22:54

## 2024-01-01 RX ADMIN — WITCH HAZEL 1 APPLICATION(S): 50 SOLUTION RECTAL at 06:20

## 2024-01-01 RX ADMIN — POVIDONE, POLYVINYL ALCOHOL 1 DROP(S): 20; 27 SOLUTION OPHTHALMIC at 17:22

## 2024-01-01 RX ADMIN — Medication 125 MICROGRAM(S): at 05:38

## 2024-01-01 RX ADMIN — LIDOCAINE 4 PATCH: 40 CREAM TOPICAL at 19:34

## 2024-01-01 RX ADMIN — Medication 2 MILLIGRAM(S): at 06:09

## 2024-01-01 RX ADMIN — Medication 8: at 17:50

## 2024-01-01 RX ADMIN — PANTOPRAZOLE SODIUM 40 MILLIGRAM(S): 40 TABLET, DELAYED RELEASE ORAL at 05:53

## 2024-01-01 RX ADMIN — CEFTOLOZANE AND TAZOBACTAM 33.33 MILLIGRAM(S): 1; .5 INJECTION, POWDER, LYOPHILIZED, FOR SOLUTION INTRAVENOUS at 15:56

## 2024-01-01 RX ADMIN — Medication 5 MILLILITER(S): at 17:53

## 2024-01-01 RX ADMIN — Medication 1 SPRAY(S): at 06:37

## 2024-01-01 RX ADMIN — Medication 500 MILLIGRAM(S): at 12:47

## 2024-01-01 RX ADMIN — FENTANYL 1 PATCH: 12 PATCH, EXTENDED RELEASE TRANSDERMAL at 00:20

## 2024-01-01 RX ADMIN — Medication 5 MILLILITER(S): at 05:20

## 2024-01-01 RX ADMIN — ACETAMINOPHEN 500MG 650 MILLIGRAM(S): 500 TABLET, COATED ORAL at 09:37

## 2024-01-01 RX ADMIN — Medication 7 UNIT(S): at 23:32

## 2024-01-01 RX ADMIN — Medication 1: at 19:17

## 2024-01-01 RX ADMIN — Medication 1 APPLICATION(S): at 21:12

## 2024-01-01 RX ADMIN — DICLOFENAC SODIUM 2 GRAM(S): 20 SOLUTION TOPICAL at 17:59

## 2024-01-01 RX ADMIN — ACETAMINOPHEN 500MG 500 MILLIGRAM(S): 500 TABLET, COATED ORAL at 22:29

## 2024-01-01 RX ADMIN — SODIUM CHLORIDE 1 GRAM(S): 9 INJECTION, SOLUTION INTRAMUSCULAR; INTRAVENOUS; SUBCUTANEOUS at 18:08

## 2024-01-01 RX ADMIN — Medication 125 MICROGRAM(S): at 04:26

## 2024-01-01 RX ADMIN — WITCH HAZEL 1 APPLICATION(S): 50 SOLUTION RECTAL at 17:41

## 2024-01-01 RX ADMIN — Medication 500 MILLIGRAM(S): at 11:27

## 2024-01-01 RX ADMIN — DICLOFENAC SODIUM 2 GRAM(S): 20 SOLUTION TOPICAL at 06:47

## 2024-01-01 RX ADMIN — ESCITALOPRAM OXALATE 10 MILLIGRAM(S): 10 TABLET, FILM COATED ORAL at 17:07

## 2024-01-01 RX ADMIN — POVIDONE, POLYVINYL ALCOHOL 1 DROP(S): 20; 27 SOLUTION OPHTHALMIC at 05:14

## 2024-01-01 RX ADMIN — Medication 5 MILLILITER(S): at 17:20

## 2024-01-01 RX ADMIN — POVIDONE, POLYVINYL ALCOHOL 1 DROP(S): 20; 27 SOLUTION OPHTHALMIC at 05:40

## 2024-01-01 RX ADMIN — LIDOCAINE 1 PATCH: 40 CREAM TOPICAL at 05:50

## 2024-01-01 RX ADMIN — PREDNISONE 5 MILLIGRAM(S): 20 TABLET ORAL at 13:08

## 2024-01-01 RX ADMIN — HYDRALAZINE HYDROCHLORIDE 10 MILLIGRAM(S): 10 TABLET ORAL at 05:45

## 2024-01-01 RX ADMIN — Medication 2 DROP(S): at 12:09

## 2024-01-01 RX ADMIN — AMLODIPINE BESYLATE 10 MILLIGRAM(S): 10 TABLET ORAL at 05:52

## 2024-01-01 RX ADMIN — INSULIN HUMAN 9 UNIT(S): 100 INJECTION, SOLUTION SUBCUTANEOUS at 17:31

## 2024-01-01 RX ADMIN — Medication 1 SPRAY(S): at 00:23

## 2024-01-01 RX ADMIN — INSULIN GLARGINE 10 UNIT(S): 100 INJECTION, SOLUTION SUBCUTANEOUS at 05:53

## 2024-01-01 RX ADMIN — POVIDONE, POLYVINYL ALCOHOL 1 DROP(S): 20; 27 SOLUTION OPHTHALMIC at 18:30

## 2024-01-01 RX ADMIN — Medication 5 MILLILITER(S): at 05:41

## 2024-01-01 RX ADMIN — Medication 12.5 MILLIGRAM(S): at 15:12

## 2024-01-01 RX ADMIN — ACETAMINOPHEN 500MG 650 MILLIGRAM(S): 500 TABLET, COATED ORAL at 13:44

## 2024-01-01 RX ADMIN — DICLOFENAC SODIUM 2 GRAM(S): 20 SOLUTION TOPICAL at 06:10

## 2024-01-01 RX ADMIN — Medication 5 MILLILITER(S): at 12:25

## 2024-01-01 RX ADMIN — DICLOFENAC SODIUM 2 GRAM(S): 20 SOLUTION TOPICAL at 22:07

## 2024-01-01 RX ADMIN — ACETAMINOPHEN 500MG 400 MILLIGRAM(S): 500 TABLET, COATED ORAL at 20:46

## 2024-01-01 RX ADMIN — SODIUM CHLORIDE 1 GRAM(S): 9 INJECTION, SOLUTION INTRAMUSCULAR; INTRAVENOUS; SUBCUTANEOUS at 05:11

## 2024-01-01 RX ADMIN — FENTANYL 1 PATCH: 12 PATCH, EXTENDED RELEASE TRANSDERMAL at 23:25

## 2024-01-01 RX ADMIN — Medication 1 TABLET(S): at 12:13

## 2024-01-01 RX ADMIN — NYSTATIN 1 APPLICATION(S): 100000 POWDER TOPICAL at 14:55

## 2024-01-01 RX ADMIN — POVIDONE, POLYVINYL ALCOHOL 1 DROP(S): 20; 27 SOLUTION OPHTHALMIC at 06:37

## 2024-01-01 RX ADMIN — Medication 4: at 17:44

## 2024-01-01 RX ADMIN — OXYCODONE HYDROCHLORIDE 5 MILLIGRAM(S): 30 TABLET ORAL at 15:04

## 2024-01-01 RX ADMIN — CEFTOLOZANE AND TAZOBACTAM 33.33 MILLIGRAM(S): 1; .5 INJECTION, POWDER, LYOPHILIZED, FOR SOLUTION INTRAVENOUS at 14:19

## 2024-01-01 RX ADMIN — Medication 12.5 MILLIGRAM(S): at 14:58

## 2024-01-01 RX ADMIN — Medication 160 MILLIGRAM(S): at 10:32

## 2024-01-01 RX ADMIN — FENTANYL 1 PATCH: 12 PATCH, EXTENDED RELEASE TRANSDERMAL at 17:59

## 2024-01-01 RX ADMIN — DICLOFENAC SODIUM 2 GRAM(S): 20 SOLUTION TOPICAL at 19:15

## 2024-01-01 RX ADMIN — Medication 50 MILLIGRAM(S): at 05:54

## 2024-01-01 RX ADMIN — POTASSIUM CHLORIDE 100 MILLIEQUIVALENT(S): 600 TABLET, EXTENDED RELEASE ORAL at 14:14

## 2024-01-01 RX ADMIN — CHLORHEXIDINE GLUCONATE 15 MILLILITER(S): 1.2 RINSE ORAL at 18:15

## 2024-01-01 RX ADMIN — Medication 1 TABLET(S): at 11:47

## 2024-01-01 RX ADMIN — Medication 1 SPRAY(S): at 17:06

## 2024-01-01 RX ADMIN — ACETAMINOPHEN 500MG 650 MILLIGRAM(S): 500 TABLET, COATED ORAL at 23:00

## 2024-01-01 RX ADMIN — POVIDONE, POLYVINYL ALCOHOL 1 DROP(S): 20; 27 SOLUTION OPHTHALMIC at 18:14

## 2024-01-01 RX ADMIN — ACETAMINOPHEN 500MG 1000 MILLIGRAM(S): 500 TABLET, COATED ORAL at 23:20

## 2024-01-01 RX ADMIN — CEFTOLOZANE AND TAZOBACTAM 33.33 MILLIGRAM(S): 1; .5 INJECTION, POWDER, LYOPHILIZED, FOR SOLUTION INTRAVENOUS at 22:28

## 2024-01-01 RX ADMIN — Medication 5 MILLILITER(S): at 05:11

## 2024-01-01 RX ADMIN — CHLORHEXIDINE GLUCONATE 15 MILLILITER(S): 1.2 RINSE ORAL at 05:13

## 2024-01-01 RX ADMIN — AMLODIPINE BESYLATE 10 MILLIGRAM(S): 10 TABLET ORAL at 06:10

## 2024-01-01 RX ADMIN — Medication 4: at 12:25

## 2024-01-01 RX ADMIN — POTASSIUM CHLORIDE 40 MILLIEQUIVALENT(S): 600 TABLET, EXTENDED RELEASE ORAL at 14:02

## 2024-01-01 RX ADMIN — WITCH HAZEL 1 APPLICATION(S): 50 SOLUTION RECTAL at 05:55

## 2024-01-01 RX ADMIN — LIDOCAINE 1 PATCH: 40 CREAM TOPICAL at 18:27

## 2024-01-01 RX ADMIN — Medication 125 MILLIGRAM(S): at 12:16

## 2024-01-01 RX ADMIN — INSULIN GLARGINE 5 UNIT(S): 100 INJECTION, SOLUTION SUBCUTANEOUS at 05:50

## 2024-01-01 RX ADMIN — Medication 5 MILLILITER(S): at 11:34

## 2024-01-01 RX ADMIN — Medication 1: at 00:41

## 2024-01-01 RX ADMIN — Medication 1 SPRAY(S): at 18:17

## 2024-01-01 RX ADMIN — POTASSIUM CHLORIDE 100 MILLIEQUIVALENT(S): 600 TABLET, EXTENDED RELEASE ORAL at 08:34

## 2024-01-01 RX ADMIN — LIDOCAINE 1 PATCH: 40 CREAM TOPICAL at 05:49

## 2024-01-01 RX ADMIN — SODIUM CHLORIDE 1 GRAM(S): 9 INJECTION, SOLUTION INTRAMUSCULAR; INTRAVENOUS; SUBCUTANEOUS at 05:57

## 2024-01-01 RX ADMIN — SODIUM CHLORIDE 1 GRAM(S): 9 INJECTION, SOLUTION INTRAMUSCULAR; INTRAVENOUS; SUBCUTANEOUS at 13:05

## 2024-01-01 RX ADMIN — Medication 22 UNIT(S): at 11:56

## 2024-01-01 RX ADMIN — Medication 125 MILLIGRAM(S): at 17:51

## 2024-01-01 RX ADMIN — ACETAMINOPHEN 500MG 650 MILLIGRAM(S): 500 TABLET, COATED ORAL at 15:55

## 2024-01-01 RX ADMIN — SODIUM CHLORIDE 1 GRAM(S): 9 INJECTION, SOLUTION INTRAMUSCULAR; INTRAVENOUS; SUBCUTANEOUS at 13:45

## 2024-01-01 RX ADMIN — IPRATROPIUM BROMIDE AND ALBUTEROL SULFATE 3 MILLILITER(S): 2.5; .5 SOLUTION RESPIRATORY (INHALATION) at 11:13

## 2024-01-01 RX ADMIN — DICLOFENAC SODIUM 2 GRAM(S): 20 SOLUTION TOPICAL at 05:25

## 2024-01-01 RX ADMIN — DICLOFENAC SODIUM 2 GRAM(S): 20 SOLUTION TOPICAL at 11:50

## 2024-01-01 RX ADMIN — DICLOFENAC SODIUM 2 GRAM(S): 20 SOLUTION TOPICAL at 00:09

## 2024-01-01 RX ADMIN — CHLORHEXIDINE GLUCONATE 15 MILLILITER(S): 1.2 RINSE ORAL at 05:46

## 2024-01-01 RX ADMIN — HYDRALAZINE HYDROCHLORIDE 10 MILLIGRAM(S): 10 TABLET ORAL at 21:09

## 2024-01-01 RX ADMIN — DIPHENHYDRAMINE HYDROCHLORIDE AND LIDOCAINE HYDROCHLORIDE AND ALUMINUM HYDROXIDE AND MAGNESIUM HYDRO 5 MILLILITER(S): KIT at 13:45

## 2024-01-01 RX ADMIN — Medication 125 MICROGRAM(S): at 04:08

## 2024-01-01 RX ADMIN — DIPHENHYDRAMINE HYDROCHLORIDE AND LIDOCAINE HYDROCHLORIDE AND ALUMINUM HYDROXIDE AND MAGNESIUM HYDRO 5 MILLILITER(S): KIT at 05:25

## 2024-01-01 RX ADMIN — PREDNISONE 5 MILLIGRAM(S): 20 TABLET ORAL at 05:12

## 2024-01-01 RX ADMIN — SODIUM CHLORIDE 1 GRAM(S): 9 INJECTION, SOLUTION INTRAMUSCULAR; INTRAVENOUS; SUBCUTANEOUS at 22:05

## 2024-01-01 RX ADMIN — Medication 4: at 17:57

## 2024-01-01 RX ADMIN — Medication 125 MILLIGRAM(S): at 17:21

## 2024-01-01 RX ADMIN — Medication 5 MILLILITER(S): at 17:08

## 2024-01-01 RX ADMIN — HYDROMORPHONE HYDROCHLORIDE 0.2 MILLIGRAM(S): 2 TABLET ORAL at 22:50

## 2024-01-01 RX ADMIN — FENTANYL 1 PATCH: 12 PATCH, EXTENDED RELEASE TRANSDERMAL at 18:19

## 2024-01-01 RX ADMIN — NYSTATIN 1 APPLICATION(S): 100000 POWDER TOPICAL at 22:55

## 2024-01-01 RX ADMIN — Medication 20 UNIT(S): at 07:06

## 2024-01-01 RX ADMIN — Medication 300 MILLIGRAM(S): at 12:08

## 2024-01-01 RX ADMIN — POVIDONE, POLYVINYL ALCOHOL 1 DROP(S): 20; 27 SOLUTION OPHTHALMIC at 05:43

## 2024-01-01 RX ADMIN — Medication 5 MILLILITER(S): at 05:13

## 2024-01-01 RX ADMIN — Medication 18 UNIT(S): at 06:01

## 2024-01-01 RX ADMIN — Medication 12.5 MILLIGRAM(S): at 15:10

## 2024-01-01 RX ADMIN — Medication 4 MILLILITER(S): at 18:13

## 2024-01-01 RX ADMIN — HYDRALAZINE HYDROCHLORIDE 10 MILLIGRAM(S): 10 TABLET ORAL at 13:08

## 2024-01-01 RX ADMIN — Medication 1000 MILLIGRAM(S): at 08:43

## 2024-01-01 RX ADMIN — Medication 5 MILLILITER(S): at 12:08

## 2024-01-01 RX ADMIN — Medication 5 MILLILITER(S): at 17:49

## 2024-01-01 RX ADMIN — Medication 12.5 MILLIGRAM(S): at 22:07

## 2024-01-01 RX ADMIN — Medication 1 TABLET(S): at 01:12

## 2024-01-01 RX ADMIN — IPRATROPIUM BROMIDE AND ALBUTEROL SULFATE 3 MILLILITER(S): 2.5; .5 SOLUTION RESPIRATORY (INHALATION) at 12:00

## 2024-01-01 RX ADMIN — POVIDONE, POLYVINYL ALCOHOL 1 DROP(S): 20; 27 SOLUTION OPHTHALMIC at 00:32

## 2024-01-01 RX ADMIN — Medication 15 MILLILITER(S): at 12:31

## 2024-01-01 RX ADMIN — FENTANYL 1 PATCH: 12 PATCH, EXTENDED RELEASE TRANSDERMAL at 19:52

## 2024-01-01 RX ADMIN — FUROSEMIDE 20 MILLIGRAM(S): 40 TABLET ORAL at 14:25

## 2024-01-01 RX ADMIN — Medication 125 MILLIGRAM(S): at 11:47

## 2024-01-01 RX ADMIN — Medication 125 MICROGRAM(S): at 04:24

## 2024-01-01 RX ADMIN — NYSTATIN 1 APPLICATION(S): 100000 POWDER TOPICAL at 05:15

## 2024-01-01 RX ADMIN — DICLOFENAC SODIUM 2 GRAM(S): 20 SOLUTION TOPICAL at 04:45

## 2024-01-01 RX ADMIN — INSULIN GLARGINE 18 UNIT(S): 100 INJECTION, SOLUTION SUBCUTANEOUS at 06:22

## 2024-01-01 RX ADMIN — ESCITALOPRAM OXALATE 10 MILLIGRAM(S): 10 TABLET, FILM COATED ORAL at 17:59

## 2024-01-01 RX ADMIN — PREDNISOLONE ACETATE 1 DROP(S): 1.2 SUSPENSION/ DROPS OPHTHALMIC at 12:17

## 2024-01-01 RX ADMIN — SODIUM CHLORIDE 1 GRAM(S): 9 INJECTION, SOLUTION INTRAMUSCULAR; INTRAVENOUS; SUBCUTANEOUS at 13:59

## 2024-01-01 RX ADMIN — LIDOCAINE 4 PATCH: 40 CREAM TOPICAL at 03:46

## 2024-01-01 RX ADMIN — PREDNISOLONE ACETATE 1 DROP(S): 1.2 SUSPENSION/ DROPS OPHTHALMIC at 05:51

## 2024-01-01 RX ADMIN — LIDOCAINE 4 PATCH: 40 CREAM TOPICAL at 18:32

## 2024-01-01 RX ADMIN — ESCITALOPRAM OXALATE 10 MILLIGRAM(S): 10 TABLET, FILM COATED ORAL at 18:00

## 2024-01-01 RX ADMIN — NYSTATIN 1 APPLICATION(S): 100000 POWDER TOPICAL at 21:29

## 2024-01-01 RX ADMIN — ACETAMINOPHEN 500MG 650 MILLIGRAM(S): 500 TABLET, COATED ORAL at 21:51

## 2024-01-01 RX ADMIN — CEFTOLOZANE AND TAZOBACTAM 33.33 MILLIGRAM(S): 1; .5 INJECTION, POWDER, LYOPHILIZED, FOR SOLUTION INTRAVENOUS at 22:55

## 2024-01-01 RX ADMIN — Medication 5 MILLILITER(S): at 23:53

## 2024-01-01 RX ADMIN — FENTANYL 1 PATCH: 12 PATCH, EXTENDED RELEASE TRANSDERMAL at 07:49

## 2024-01-01 RX ADMIN — SODIUM CHLORIDE 1 GRAM(S): 9 INJECTION, SOLUTION INTRAMUSCULAR; INTRAVENOUS; SUBCUTANEOUS at 18:01

## 2024-01-01 RX ADMIN — IPRATROPIUM BROMIDE AND ALBUTEROL SULFATE 3 MILLILITER(S): 2.5; .5 SOLUTION RESPIRATORY (INHALATION) at 05:53

## 2024-01-01 RX ADMIN — Medication 5 MILLILITER(S): at 12:38

## 2024-01-01 RX ADMIN — LIDOCAINE 4 PATCH: 40 CREAM TOPICAL at 15:27

## 2024-01-01 RX ADMIN — LIDOCAINE 4 PATCH: 40 CREAM TOPICAL at 16:33

## 2024-01-01 RX ADMIN — WITCH HAZEL 1 APPLICATION(S): 50 SOLUTION RECTAL at 05:42

## 2024-01-01 RX ADMIN — Medication 5 MILLILITER(S): at 00:00

## 2024-01-01 RX ADMIN — IPRATROPIUM BROMIDE AND ALBUTEROL SULFATE 3 MILLILITER(S): 2.5; .5 SOLUTION RESPIRATORY (INHALATION) at 05:21

## 2024-01-01 RX ADMIN — IPRATROPIUM BROMIDE AND ALBUTEROL SULFATE 3 MILLILITER(S): 2.5; .5 SOLUTION RESPIRATORY (INHALATION) at 11:36

## 2024-01-01 RX ADMIN — GABAPENTIN 250 MILLIGRAM(S): 300 CAPSULE ORAL at 21:31

## 2024-01-01 RX ADMIN — Medication 5 MILLILITER(S): at 05:50

## 2024-01-01 RX ADMIN — SODIUM CHLORIDE 4 MILLILITER(S): 9 INJECTION, SOLUTION INTRAMUSCULAR; INTRAVENOUS; SUBCUTANEOUS at 11:55

## 2024-01-01 RX ADMIN — SODIUM CHLORIDE 1 GRAM(S): 9 INJECTION, SOLUTION INTRAMUSCULAR; INTRAVENOUS; SUBCUTANEOUS at 14:17

## 2024-01-01 RX ADMIN — Medication 3: at 17:45

## 2024-01-01 RX ADMIN — FENTANYL 1 PATCH: 12 PATCH, EXTENDED RELEASE TRANSDERMAL at 19:33

## 2024-01-01 RX ADMIN — DIPHENHYDRAMINE HYDROCHLORIDE AND LIDOCAINE HYDROCHLORIDE AND ALUMINUM HYDROXIDE AND MAGNESIUM HYDRO 5 MILLILITER(S): KIT at 05:18

## 2024-01-01 RX ADMIN — Medication 1: at 11:49

## 2024-01-01 RX ADMIN — DICLOFENAC SODIUM 2 GRAM(S): 20 SOLUTION TOPICAL at 12:08

## 2024-01-01 RX ADMIN — POVIDONE, POLYVINYL ALCOHOL 1 DROP(S): 20; 27 SOLUTION OPHTHALMIC at 11:01

## 2024-01-01 RX ADMIN — INSULIN GLARGINE 15 UNIT(S): 100 INJECTION, SOLUTION SUBCUTANEOUS at 23:48

## 2024-01-01 RX ADMIN — Medication 2: at 18:14

## 2024-01-01 RX ADMIN — CHLORHEXIDINE GLUCONATE 1 APPLICATION(S): 1.2 RINSE ORAL at 21:25

## 2024-01-01 RX ADMIN — Medication 1 SPRAY(S): at 23:34

## 2024-01-01 RX ADMIN — CHLORHEXIDINE GLUCONATE 15 MILLILITER(S): 1.2 RINSE ORAL at 06:35

## 2024-01-01 RX ADMIN — INSULIN GLARGINE 10 UNIT(S): 100 INJECTION, SOLUTION SUBCUTANEOUS at 06:11

## 2024-01-01 RX ADMIN — DICLOFENAC SODIUM 2 GRAM(S): 20 SOLUTION TOPICAL at 05:43

## 2024-01-01 RX ADMIN — MEROPENEM 100 MILLIGRAM(S): 500 INJECTION, POWDER, FOR SOLUTION INTRAVENOUS at 05:47

## 2024-01-01 RX ADMIN — FENTANYL 1 PATCH: 12 PATCH, EXTENDED RELEASE TRANSDERMAL at 08:03

## 2024-01-01 RX ADMIN — SODIUM CHLORIDE 1 GRAM(S): 9 INJECTION, SOLUTION INTRAMUSCULAR; INTRAVENOUS; SUBCUTANEOUS at 06:11

## 2024-01-01 RX ADMIN — DICLOFENAC SODIUM 2 GRAM(S): 20 SOLUTION TOPICAL at 22:32

## 2024-01-01 RX ADMIN — CEFEPIME 100 MILLIGRAM(S): 2 INJECTION, POWDER, FOR SOLUTION INTRAVENOUS at 14:28

## 2024-01-01 RX ADMIN — DIPHENHYDRAMINE HYDROCHLORIDE AND LIDOCAINE HYDROCHLORIDE AND ALUMINUM HYDROXIDE AND MAGNESIUM HYDRO 5 MILLILITER(S): KIT at 13:01

## 2024-01-01 RX ADMIN — Medication 1 SPRAY(S): at 22:42

## 2024-01-01 RX ADMIN — PANTOPRAZOLE SODIUM 40 MILLIGRAM(S): 40 TABLET, DELAYED RELEASE ORAL at 05:39

## 2024-01-01 RX ADMIN — ESCITALOPRAM OXALATE 10 MILLIGRAM(S): 10 TABLET, FILM COATED ORAL at 18:30

## 2024-01-01 RX ADMIN — OXYCODONE HYDROCHLORIDE 2.5 MILLIGRAM(S): 30 TABLET ORAL at 18:45

## 2024-01-01 RX ADMIN — Medication 1 SPRAY(S): at 04:43

## 2024-01-01 RX ADMIN — CHLORHEXIDINE GLUCONATE 15 MILLILITER(S): 1.2 RINSE ORAL at 05:44

## 2024-01-01 RX ADMIN — FENTANYL 1 PATCH: 12 PATCH, EXTENDED RELEASE TRANSDERMAL at 18:02

## 2024-01-01 RX ADMIN — Medication 5 MILLILITER(S): at 22:52

## 2024-01-01 RX ADMIN — DICLOFENAC SODIUM 2 GRAM(S): 20 SOLUTION TOPICAL at 17:50

## 2024-01-01 RX ADMIN — Medication 2: at 23:32

## 2024-01-01 RX ADMIN — Medication 5 MILLILITER(S): at 05:33

## 2024-01-01 RX ADMIN — HYDRALAZINE HYDROCHLORIDE 10 MILLIGRAM(S): 10 TABLET ORAL at 21:32

## 2024-01-01 RX ADMIN — GABAPENTIN 250 MILLIGRAM(S): 300 CAPSULE ORAL at 22:05

## 2024-01-01 RX ADMIN — Medication 300 MILLIGRAM(S): at 13:05

## 2024-01-01 RX ADMIN — ACETAMINOPHEN 500MG 650 MILLIGRAM(S): 500 TABLET, COATED ORAL at 06:10

## 2024-01-01 RX ADMIN — SODIUM CHLORIDE 1 GRAM(S): 9 INJECTION, SOLUTION INTRAMUSCULAR; INTRAVENOUS; SUBCUTANEOUS at 06:16

## 2024-01-01 RX ADMIN — CEFTOLOZANE AND TAZOBACTAM 33.33 MILLIGRAM(S): 1; .5 INJECTION, POWDER, LYOPHILIZED, FOR SOLUTION INTRAVENOUS at 05:18

## 2024-01-01 RX ADMIN — DICLOFENAC SODIUM 2 GRAM(S): 20 SOLUTION TOPICAL at 17:25

## 2024-01-01 RX ADMIN — Medication 10: at 18:35

## 2024-01-01 RX ADMIN — DIPHENHYDRAMINE HYDROCHLORIDE AND LIDOCAINE HYDROCHLORIDE AND ALUMINUM HYDROXIDE AND MAGNESIUM HYDRO 5 MILLILITER(S): KIT at 21:36

## 2024-01-01 RX ADMIN — DICLOFENAC SODIUM 2 GRAM(S): 20 SOLUTION TOPICAL at 11:40

## 2024-01-01 RX ADMIN — Medication 12.5 MILLIGRAM(S): at 14:10

## 2024-01-01 RX ADMIN — Medication 1 TABLET(S): at 12:24

## 2024-01-01 RX ADMIN — POVIDONE, POLYVINYL ALCOHOL 1 DROP(S): 20; 27 SOLUTION OPHTHALMIC at 23:59

## 2024-01-01 RX ADMIN — ESCITALOPRAM OXALATE 10 MILLIGRAM(S): 10 TABLET, FILM COATED ORAL at 19:25

## 2024-01-01 RX ADMIN — Medication 1 SPRAY(S): at 17:20

## 2024-01-01 RX ADMIN — CHLORHEXIDINE GLUCONATE 15 MILLILITER(S): 1.2 RINSE ORAL at 18:04

## 2024-01-01 RX ADMIN — Medication 10: at 12:24

## 2024-01-01 RX ADMIN — DICLOFENAC SODIUM 2 GRAM(S): 20 SOLUTION TOPICAL at 17:53

## 2024-01-01 RX ADMIN — DICLOFENAC SODIUM 2 GRAM(S): 20 SOLUTION TOPICAL at 18:12

## 2024-01-01 RX ADMIN — Medication 1 TABLET(S): at 12:15

## 2024-01-01 RX ADMIN — ESCITALOPRAM OXALATE 10 MILLIGRAM(S): 10 TABLET, FILM COATED ORAL at 18:07

## 2024-01-01 RX ADMIN — CHLORHEXIDINE GLUCONATE 1 APPLICATION(S): 1.2 RINSE ORAL at 22:29

## 2024-01-01 RX ADMIN — Medication 125 MICROGRAM(S): at 05:01

## 2024-01-01 RX ADMIN — Medication 2: at 17:44

## 2024-01-01 RX ADMIN — SODIUM CHLORIDE 1 GRAM(S): 9 INJECTION, SOLUTION INTRAMUSCULAR; INTRAVENOUS; SUBCUTANEOUS at 05:50

## 2024-01-01 RX ADMIN — HYDRALAZINE HYDROCHLORIDE 10 MILLIGRAM(S): 10 TABLET ORAL at 22:23

## 2024-01-01 RX ADMIN — Medication 12.5 MILLIGRAM(S): at 14:51

## 2024-01-01 RX ADMIN — Medication 1 APPLICATION(S): at 22:29

## 2024-01-01 RX ADMIN — ACETAMINOPHEN 500MG 500 MILLIGRAM(S): 500 TABLET, COATED ORAL at 12:21

## 2024-01-01 RX ADMIN — Medication 5 MILLILITER(S): at 00:33

## 2024-01-01 RX ADMIN — NYSTATIN 1 APPLICATION(S): 100000 POWDER TOPICAL at 05:53

## 2024-01-01 RX ADMIN — DICLOFENAC SODIUM 2 GRAM(S): 20 SOLUTION TOPICAL at 23:09

## 2024-01-01 RX ADMIN — LIDOCAINE 4 PATCH: 40 CREAM TOPICAL at 06:28

## 2024-01-01 RX ADMIN — Medication 5 MILLILITER(S): at 17:40

## 2024-01-01 RX ADMIN — POVIDONE, POLYVINYL ALCOHOL 1 DROP(S): 20; 27 SOLUTION OPHTHALMIC at 07:07

## 2024-01-01 RX ADMIN — PREDNISONE 5 MILLIGRAM(S): 20 TABLET ORAL at 09:23

## 2024-01-01 RX ADMIN — Medication 4 MILLILITER(S): at 05:59

## 2024-01-01 RX ADMIN — DICLOFENAC SODIUM 2 GRAM(S): 20 SOLUTION TOPICAL at 00:05

## 2024-01-01 RX ADMIN — Medication 12.5 MILLIGRAM(S): at 14:02

## 2024-01-01 RX ADMIN — Medication 14 UNIT(S): at 11:36

## 2024-01-01 RX ADMIN — SODIUM CHLORIDE 1 GRAM(S): 9 INJECTION, SOLUTION INTRAMUSCULAR; INTRAVENOUS; SUBCUTANEOUS at 06:36

## 2024-01-01 RX ADMIN — Medication 50 MILLILITER(S): at 06:36

## 2024-01-01 RX ADMIN — FENTANYL 1 PATCH: 12 PATCH, EXTENDED RELEASE TRANSDERMAL at 00:12

## 2024-01-01 RX ADMIN — WITCH HAZEL 1 APPLICATION(S): 50 SOLUTION RECTAL at 05:02

## 2024-01-01 RX ADMIN — Medication 125 MICROGRAM(S): at 04:03

## 2024-01-01 RX ADMIN — NYSTATIN 1 APPLICATION(S): 100000 POWDER TOPICAL at 21:44

## 2024-01-01 RX ADMIN — Medication 1 APPLICATION(S): at 22:43

## 2024-01-01 RX ADMIN — Medication 125 MICROGRAM(S): at 05:15

## 2024-01-01 RX ADMIN — Medication 1 SPRAY(S): at 23:44

## 2024-01-01 RX ADMIN — ACETAMINOPHEN 500MG 1000 MILLIGRAM(S): 500 TABLET, COATED ORAL at 14:07

## 2024-01-01 RX ADMIN — ACETAMINOPHEN 500MG 1000 MILLIGRAM(S): 500 TABLET, COATED ORAL at 05:18

## 2024-01-01 RX ADMIN — PANTOPRAZOLE SODIUM 40 MILLIGRAM(S): 40 TABLET, DELAYED RELEASE ORAL at 05:27

## 2024-01-01 RX ADMIN — DICLOFENAC SODIUM 2 GRAM(S): 20 SOLUTION TOPICAL at 00:46

## 2024-01-01 RX ADMIN — LIDOCAINE 1 PATCH: 40 CREAM TOPICAL at 06:48

## 2024-01-01 RX ADMIN — INSULIN GLARGINE 10 UNIT(S): 100 INJECTION, SOLUTION SUBCUTANEOUS at 05:17

## 2024-01-01 RX ADMIN — LIDOCAINE 4 PATCH: 40 CREAM TOPICAL at 17:59

## 2024-01-01 RX ADMIN — CHLORHEXIDINE GLUCONATE 15 MILLILITER(S): 1.2 RINSE ORAL at 05:01

## 2024-01-01 RX ADMIN — Medication 1: at 18:14

## 2024-01-01 RX ADMIN — NYSTATIN 1 APPLICATION(S): 100000 POWDER TOPICAL at 14:16

## 2024-01-01 RX ADMIN — Medication 2: at 11:55

## 2024-01-01 RX ADMIN — Medication 125 MILLIGRAM(S): at 06:15

## 2024-01-01 RX ADMIN — POVIDONE, POLYVINYL ALCOHOL 1 DROP(S): 20; 27 SOLUTION OPHTHALMIC at 00:00

## 2024-01-01 RX ADMIN — Medication 1 TABLET(S): at 11:28

## 2024-01-01 RX ADMIN — Medication 125 MILLIGRAM(S): at 23:10

## 2024-01-01 RX ADMIN — ACETAMINOPHEN 500MG 1000 MILLIGRAM(S): 500 TABLET, COATED ORAL at 21:17

## 2024-01-01 RX ADMIN — PREDNISONE 5 MILLIGRAM(S): 20 TABLET ORAL at 14:02

## 2024-01-01 RX ADMIN — FENTANYL 1 PATCH: 12 PATCH, EXTENDED RELEASE TRANSDERMAL at 07:25

## 2024-01-01 RX ADMIN — SODIUM CHLORIDE 1 GRAM(S): 9 INJECTION, SOLUTION INTRAMUSCULAR; INTRAVENOUS; SUBCUTANEOUS at 13:14

## 2024-01-01 RX ADMIN — POVIDONE, POLYVINYL ALCOHOL 1 DROP(S): 20; 27 SOLUTION OPHTHALMIC at 05:37

## 2024-01-01 RX ADMIN — HYDROMORPHONE HYDROCHLORIDE 0.5 MILLIGRAM(S): 2 TABLET ORAL at 16:28

## 2024-01-01 RX ADMIN — POVIDONE, POLYVINYL ALCOHOL 1 DROP(S): 20; 27 SOLUTION OPHTHALMIC at 06:12

## 2024-01-01 RX ADMIN — Medication 1: at 05:47

## 2024-01-01 RX ADMIN — WITCH HAZEL 1 APPLICATION(S): 50 SOLUTION RECTAL at 19:16

## 2024-01-01 RX ADMIN — PREDNISONE 5 MILLIGRAM(S): 20 TABLET ORAL at 06:04

## 2024-01-01 RX ADMIN — DICLOFENAC SODIUM 2 GRAM(S): 20 SOLUTION TOPICAL at 18:39

## 2024-01-01 RX ADMIN — Medication 5 UNIT(S): at 13:36

## 2024-01-01 RX ADMIN — Medication 20 MILLIGRAM(S): at 04:25

## 2024-01-01 RX ADMIN — HYDRALAZINE HYDROCHLORIDE 10 MILLIGRAM(S): 10 TABLET ORAL at 20:41

## 2024-01-01 RX ADMIN — POVIDONE, POLYVINYL ALCOHOL 1 DROP(S): 20; 27 SOLUTION OPHTHALMIC at 18:54

## 2024-01-01 RX ADMIN — ACETAMINOPHEN 500MG 1000 MILLIGRAM(S): 500 TABLET, COATED ORAL at 06:07

## 2024-01-01 RX ADMIN — CHLORHEXIDINE GLUCONATE 1 APPLICATION(S): 1.2 RINSE ORAL at 21:51

## 2024-01-01 RX ADMIN — Medication 500 MILLIGRAM(S): at 12:07

## 2024-01-01 RX ADMIN — ACETAMINOPHEN 500MG 650 MILLIGRAM(S): 500 TABLET, COATED ORAL at 14:45

## 2024-01-01 RX ADMIN — NYSTATIN 1 APPLICATION(S): 100000 POWDER TOPICAL at 13:11

## 2024-01-01 RX ADMIN — ESCITALOPRAM OXALATE 10 MILLIGRAM(S): 10 TABLET, FILM COATED ORAL at 18:12

## 2024-01-01 RX ADMIN — Medication 24 UNIT(S): at 06:25

## 2024-01-01 RX ADMIN — DIPHENHYDRAMINE HYDROCHLORIDE AND LIDOCAINE HYDROCHLORIDE AND ALUMINUM HYDROXIDE AND MAGNESIUM HYDRO 5 MILLILITER(S): KIT at 06:04

## 2024-01-01 RX ADMIN — Medication 5 MILLILITER(S): at 17:19

## 2024-01-01 RX ADMIN — MIDODRINE HYDROCHLORIDE 10 MILLIGRAM(S): 5 TABLET ORAL at 05:47

## 2024-01-01 RX ADMIN — Medication 1 SPRAY(S): at 17:34

## 2024-01-01 RX ADMIN — Medication 100 GRAM(S): at 10:58

## 2024-01-01 RX ADMIN — IPRATROPIUM BROMIDE AND ALBUTEROL SULFATE 3 MILLILITER(S): 2.5; .5 SOLUTION RESPIRATORY (INHALATION) at 17:29

## 2024-01-01 RX ADMIN — PREDNISOLONE ACETATE 1 DROP(S): 1.2 SUSPENSION/ DROPS OPHTHALMIC at 18:52

## 2024-01-01 RX ADMIN — Medication 125 MILLIGRAM(S): at 11:26

## 2024-01-01 RX ADMIN — WITCH HAZEL 1 APPLICATION(S): 50 SOLUTION RECTAL at 05:06

## 2024-01-01 RX ADMIN — POVIDONE, POLYVINYL ALCOHOL 1 DROP(S): 20; 27 SOLUTION OPHTHALMIC at 12:28

## 2024-01-01 RX ADMIN — PREDNISOLONE ACETATE 1 DROP(S): 1.2 SUSPENSION/ DROPS OPHTHALMIC at 17:14

## 2024-01-01 RX ADMIN — Medication 15 MILLILITER(S): at 12:02

## 2024-01-01 RX ADMIN — Medication 5 MILLILITER(S): at 13:00

## 2024-01-01 RX ADMIN — Medication 1 SPRAY(S): at 05:12

## 2024-01-01 RX ADMIN — POVIDONE, POLYVINYL ALCOHOL 1 DROP(S): 20; 27 SOLUTION OPHTHALMIC at 04:40

## 2024-01-01 RX ADMIN — INSULIN GLARGINE 18 UNIT(S): 100 INJECTION, SOLUTION SUBCUTANEOUS at 06:55

## 2024-01-01 RX ADMIN — LIDOCAINE 4 PATCH: 40 CREAM TOPICAL at 15:43

## 2024-01-01 RX ADMIN — PREDNISONE 5 MILLIGRAM(S): 20 TABLET ORAL at 10:39

## 2024-01-01 RX ADMIN — Medication 5 MILLILITER(S): at 23:31

## 2024-01-01 RX ADMIN — LIDOCAINE 4 PATCH: 40 CREAM TOPICAL at 22:13

## 2024-01-01 RX ADMIN — ACETAMINOPHEN 500MG 1000 MILLIGRAM(S): 500 TABLET, COATED ORAL at 19:00

## 2024-01-01 RX ADMIN — Medication 1 SPRAY(S): at 17:51

## 2024-01-01 RX ADMIN — LIDOCAINE 4 PATCH: 40 CREAM TOPICAL at 18:24

## 2024-01-01 RX ADMIN — CHLORHEXIDINE GLUCONATE 15 MILLILITER(S): 1.2 RINSE ORAL at 17:51

## 2024-01-01 RX ADMIN — ACETAMINOPHEN 500MG 1000 MILLIGRAM(S): 500 TABLET, COATED ORAL at 06:42

## 2024-01-01 RX ADMIN — FENTANYL 1 PATCH: 12 PATCH, EXTENDED RELEASE TRANSDERMAL at 23:28

## 2024-01-01 RX ADMIN — SODIUM CHLORIDE 1 GRAM(S): 9 INJECTION, SOLUTION INTRAMUSCULAR; INTRAVENOUS; SUBCUTANEOUS at 05:32

## 2024-01-01 RX ADMIN — IPRATROPIUM BROMIDE AND ALBUTEROL SULFATE 3 MILLILITER(S): 2.5; .5 SOLUTION RESPIRATORY (INHALATION) at 23:16

## 2024-01-01 RX ADMIN — Medication 1 TABLET(S): at 11:24

## 2024-01-01 RX ADMIN — PREDNISOLONE ACETATE 1 DROP(S): 1.2 SUSPENSION/ DROPS OPHTHALMIC at 12:09

## 2024-01-01 RX ADMIN — DICLOFENAC SODIUM 2 GRAM(S): 20 SOLUTION TOPICAL at 11:02

## 2024-01-01 RX ADMIN — Medication 5 UNIT(S): at 14:26

## 2024-01-01 RX ADMIN — Medication 5 MILLILITER(S): at 17:23

## 2024-01-01 RX ADMIN — Medication 5 MILLILITER(S): at 06:03

## 2024-01-01 RX ADMIN — HYDROMORPHONE HYDROCHLORIDE 0.5 MILLIGRAM(S): 2 TABLET ORAL at 05:38

## 2024-01-01 RX ADMIN — METRONIDAZOLE 100 MILLIGRAM(S): 500 TABLET ORAL at 23:24

## 2024-01-01 RX ADMIN — FENTANYL 1 PATCH: 12 PATCH, EXTENDED RELEASE TRANSDERMAL at 21:46

## 2024-01-01 RX ADMIN — ACETAMINOPHEN 500MG 400 MILLIGRAM(S): 500 TABLET, COATED ORAL at 12:56

## 2024-01-01 RX ADMIN — POVIDONE, POLYVINYL ALCOHOL 1 DROP(S): 20; 27 SOLUTION OPHTHALMIC at 05:42

## 2024-01-01 RX ADMIN — POTASSIUM CHLORIDE 100 MILLIEQUIVALENT(S): 600 TABLET, EXTENDED RELEASE ORAL at 10:05

## 2024-01-01 RX ADMIN — HYDRALAZINE HYDROCHLORIDE 10 MILLIGRAM(S): 10 TABLET ORAL at 16:14

## 2024-01-01 RX ADMIN — Medication 5 MILLILITER(S): at 13:56

## 2024-01-01 RX ADMIN — FENTANYL 1 PATCH: 12 PATCH, EXTENDED RELEASE TRANSDERMAL at 08:00

## 2024-01-01 RX ADMIN — Medication 125 MILLIGRAM(S): at 11:39

## 2024-01-01 RX ADMIN — SODIUM CHLORIDE 1 GRAM(S): 9 INJECTION, SOLUTION INTRAMUSCULAR; INTRAVENOUS; SUBCUTANEOUS at 18:32

## 2024-01-01 RX ADMIN — Medication 500 MILLIGRAM(S): at 12:10

## 2024-01-01 RX ADMIN — Medication 500 MILLIGRAM(S): at 11:39

## 2024-01-01 RX ADMIN — Medication 4: at 18:32

## 2024-01-01 RX ADMIN — CHLORHEXIDINE GLUCONATE 1 APPLICATION(S): 1.2 RINSE ORAL at 22:24

## 2024-01-01 RX ADMIN — INSULIN GLARGINE 15 UNIT(S): 100 INJECTION, SOLUTION SUBCUTANEOUS at 23:24

## 2024-01-01 RX ADMIN — GABAPENTIN 250 MILLIGRAM(S): 300 CAPSULE ORAL at 22:23

## 2024-01-01 RX ADMIN — NYSTATIN 1 APPLICATION(S): 100000 POWDER TOPICAL at 21:47

## 2024-01-01 RX ADMIN — Medication 5 MILLILITER(S): at 18:15

## 2024-01-01 RX ADMIN — ACETAMINOPHEN 500MG 650 MILLIGRAM(S): 500 TABLET, COATED ORAL at 06:18

## 2024-01-01 RX ADMIN — DICLOFENAC SODIUM 2 GRAM(S): 20 SOLUTION TOPICAL at 12:19

## 2024-01-01 RX ADMIN — POVIDONE, POLYVINYL ALCOHOL 1 DROP(S): 20; 27 SOLUTION OPHTHALMIC at 06:19

## 2024-01-01 RX ADMIN — PREDNISOLONE ACETATE 1 DROP(S): 1.2 SUSPENSION/ DROPS OPHTHALMIC at 06:50

## 2024-01-01 RX ADMIN — IPRATROPIUM BROMIDE AND ALBUTEROL SULFATE 3 MILLILITER(S): 2.5; .5 SOLUTION RESPIRATORY (INHALATION) at 17:34

## 2024-01-01 RX ADMIN — CHLORHEXIDINE GLUCONATE 15 MILLILITER(S): 1.2 RINSE ORAL at 17:24

## 2024-01-01 RX ADMIN — Medication 5 MILLILITER(S): at 17:37

## 2024-01-01 RX ADMIN — SODIUM CHLORIDE 1 GRAM(S): 9 INJECTION, SOLUTION INTRAMUSCULAR; INTRAVENOUS; SUBCUTANEOUS at 21:39

## 2024-01-01 RX ADMIN — HYDROMORPHONE HYDROCHLORIDE 0.5 MILLIGRAM(S): 2 TABLET ORAL at 15:49

## 2024-01-01 RX ADMIN — Medication 2: at 00:14

## 2024-01-01 RX ADMIN — Medication 5 MILLILITER(S): at 23:35

## 2024-01-01 RX ADMIN — IPRATROPIUM BROMIDE AND ALBUTEROL SULFATE 3 MILLILITER(S): 2.5; .5 SOLUTION RESPIRATORY (INHALATION) at 17:15

## 2024-01-01 RX ADMIN — Medication 250 MILLIGRAM(S): at 22:51

## 2024-01-01 RX ADMIN — DICLOFENAC SODIUM 2 GRAM(S): 20 SOLUTION TOPICAL at 18:31

## 2024-01-01 RX ADMIN — LIDOCAINE 4 PATCH: 40 CREAM TOPICAL at 05:41

## 2024-01-01 RX ADMIN — CHLORHEXIDINE GLUCONATE 15 MILLILITER(S): 1.2 RINSE ORAL at 17:06

## 2024-01-01 RX ADMIN — PREDNISONE 5 MILLIGRAM(S): 20 TABLET ORAL at 12:38

## 2024-01-01 RX ADMIN — Medication 4: at 00:55

## 2024-01-01 RX ADMIN — LIDOCAINE 1 PATCH: 40 CREAM TOPICAL at 18:56

## 2024-01-01 RX ADMIN — Medication 1 SPRAY(S): at 22:27

## 2024-01-01 RX ADMIN — Medication 6: at 16:08

## 2024-01-01 RX ADMIN — IPRATROPIUM BROMIDE AND ALBUTEROL SULFATE 3 MILLILITER(S): 2.5; .5 SOLUTION RESPIRATORY (INHALATION) at 17:27

## 2024-01-01 RX ADMIN — POVIDONE, POLYVINYL ALCOHOL 1 DROP(S): 20; 27 SOLUTION OPHTHALMIC at 13:00

## 2024-01-01 RX ADMIN — SODIUM CHLORIDE 1 GRAM(S): 9 INJECTION, SOLUTION INTRAMUSCULAR; INTRAVENOUS; SUBCUTANEOUS at 06:09

## 2024-01-01 RX ADMIN — GABAPENTIN 250 MILLIGRAM(S): 300 CAPSULE ORAL at 21:25

## 2024-01-01 RX ADMIN — CHLORHEXIDINE GLUCONATE 1 APPLICATION(S): 1.2 RINSE ORAL at 21:59

## 2024-01-01 RX ADMIN — CHLORHEXIDINE GLUCONATE 15 MILLILITER(S): 1.2 RINSE ORAL at 17:43

## 2024-01-01 RX ADMIN — Medication 250 MILLIGRAM(S): at 15:31

## 2024-01-01 RX ADMIN — Medication 5 MILLILITER(S): at 12:09

## 2024-01-01 RX ADMIN — INSULIN GLARGINE 20 UNIT(S): 100 INJECTION, SOLUTION SUBCUTANEOUS at 23:51

## 2024-01-01 RX ADMIN — ACETAMINOPHEN 500MG 1000 MILLIGRAM(S): 500 TABLET, COATED ORAL at 12:00

## 2024-01-01 RX ADMIN — Medication 1 SPRAY(S): at 23:48

## 2024-01-01 RX ADMIN — TRIAMCINOLONE ACETONIDE 1 APPLICATION(S): 0.15 AEROSOL, SPRAY TOPICAL at 14:04

## 2024-01-01 RX ADMIN — SODIUM CHLORIDE 250 MILLILITER(S): 9 INJECTION, SOLUTION INTRAMUSCULAR; INTRAVENOUS; SUBCUTANEOUS at 05:22

## 2024-01-01 RX ADMIN — Medication 5 MILLILITER(S): at 05:51

## 2024-01-01 RX ADMIN — CHLORHEXIDINE GLUCONATE 15 MILLILITER(S): 1.2 RINSE ORAL at 05:26

## 2024-01-01 RX ADMIN — Medication 5 MILLILITER(S): at 01:29

## 2024-01-01 RX ADMIN — Medication 5 MILLILITER(S): at 08:39

## 2024-01-01 RX ADMIN — Medication 1 TABLET(S): at 12:33

## 2024-01-01 RX ADMIN — Medication 125 MILLIGRAM(S): at 22:51

## 2024-01-01 RX ADMIN — LIDOCAINE 1 PATCH: 40 CREAM TOPICAL at 07:53

## 2024-01-01 RX ADMIN — Medication 300 MILLIGRAM(S): at 23:14

## 2024-01-01 RX ADMIN — NYSTATIN 1 APPLICATION(S): 100000 POWDER TOPICAL at 06:17

## 2024-01-01 RX ADMIN — LIDOCAINE 4 PATCH: 40 CREAM TOPICAL at 19:53

## 2024-01-01 RX ADMIN — Medication 25 MILLIGRAM(S): at 06:16

## 2024-01-01 RX ADMIN — Medication 5 MILLILITER(S): at 05:16

## 2024-01-01 RX ADMIN — PREDNISONE 5 MILLIGRAM(S): 20 TABLET ORAL at 11:17

## 2024-01-01 RX ADMIN — PANTOPRAZOLE SODIUM 40 MILLIGRAM(S): 40 TABLET, DELAYED RELEASE ORAL at 05:42

## 2024-01-01 RX ADMIN — SODIUM CHLORIDE 4 MILLILITER(S): 9 INJECTION, SOLUTION INTRAMUSCULAR; INTRAVENOUS; SUBCUTANEOUS at 23:20

## 2024-01-01 RX ADMIN — GABAPENTIN 250 MILLIGRAM(S): 300 CAPSULE ORAL at 21:18

## 2024-01-01 RX ADMIN — Medication 12.5 MILLIGRAM(S): at 14:21

## 2024-01-01 RX ADMIN — CHLORHEXIDINE GLUCONATE 15 MILLILITER(S): 1.2 RINSE ORAL at 05:39

## 2024-01-01 RX ADMIN — ACETAMINOPHEN 500MG 650 MILLIGRAM(S): 500 TABLET, COATED ORAL at 05:23

## 2024-01-01 RX ADMIN — GABAPENTIN 100 MILLIGRAM(S): 400 CAPSULE ORAL at 21:53

## 2024-01-01 RX ADMIN — LIDOCAINE 4 PATCH: 40 CREAM TOPICAL at 15:06

## 2024-01-01 RX ADMIN — LIDOCAINE 4 PATCH: 40 CREAM TOPICAL at 20:00

## 2024-01-01 RX ADMIN — Medication 3: at 18:48

## 2024-01-01 RX ADMIN — FENTANYL 1 PATCH: 12 PATCH, EXTENDED RELEASE TRANSDERMAL at 23:45

## 2024-01-01 RX ADMIN — NYSTATIN 1 APPLICATION(S): 100000 POWDER TOPICAL at 06:36

## 2024-01-01 RX ADMIN — DICLOFENAC SODIUM 2 GRAM(S): 20 SOLUTION TOPICAL at 12:21

## 2024-01-01 RX ADMIN — WITCH HAZEL 1 APPLICATION(S): 50 SOLUTION RECTAL at 18:05

## 2024-01-01 RX ADMIN — MEROPENEM 100 MILLIGRAM(S): 500 INJECTION, POWDER, FOR SOLUTION INTRAVENOUS at 21:54

## 2024-01-01 RX ADMIN — PANTOPRAZOLE SODIUM 40 MILLIGRAM(S): 40 TABLET, DELAYED RELEASE ORAL at 18:22

## 2024-01-01 RX ADMIN — CHLORHEXIDINE GLUCONATE 15 MILLILITER(S): 1.2 RINSE ORAL at 05:16

## 2024-01-01 RX ADMIN — GABAPENTIN 250 MILLIGRAM(S): 300 CAPSULE ORAL at 23:02

## 2024-01-01 RX ADMIN — ACETAMINOPHEN 500MG 1000 MILLIGRAM(S): 500 TABLET, COATED ORAL at 14:20

## 2024-01-01 RX ADMIN — IPRATROPIUM BROMIDE AND ALBUTEROL SULFATE 3 MILLILITER(S): 2.5; .5 SOLUTION RESPIRATORY (INHALATION) at 11:11

## 2024-01-01 RX ADMIN — DICLOFENAC SODIUM 2 GRAM(S): 20 SOLUTION TOPICAL at 11:26

## 2024-01-01 RX ADMIN — PREDNISOLONE ACETATE 1 DROP(S): 1.2 SUSPENSION/ DROPS OPHTHALMIC at 18:41

## 2024-01-01 RX ADMIN — DICLOFENAC SODIUM 2 GRAM(S): 20 SOLUTION TOPICAL at 17:46

## 2024-01-01 RX ADMIN — WITCH HAZEL 1 APPLICATION(S): 50 SOLUTION RECTAL at 18:35

## 2024-01-01 RX ADMIN — GABAPENTIN 250 MILLIGRAM(S): 300 CAPSULE ORAL at 22:21

## 2024-01-01 RX ADMIN — Medication 12.5 GRAM(S): at 05:20

## 2024-01-01 RX ADMIN — WITCH HAZEL 1 APPLICATION(S): 50 SOLUTION RECTAL at 19:30

## 2024-01-01 RX ADMIN — POVIDONE, POLYVINYL ALCOHOL 1 DROP(S): 20; 27 SOLUTION OPHTHALMIC at 22:31

## 2024-01-01 RX ADMIN — NYSTATIN 1 APPLICATION(S): 100000 POWDER TOPICAL at 05:25

## 2024-01-01 RX ADMIN — PREDNISOLONE ACETATE 1 DROP(S): 1.2 SUSPENSION/ DROPS OPHTHALMIC at 18:06

## 2024-01-01 RX ADMIN — Medication 2: at 18:07

## 2024-01-01 RX ADMIN — PREDNISOLONE ACETATE 1 DROP(S): 1.2 SUSPENSION/ DROPS OPHTHALMIC at 06:57

## 2024-01-01 RX ADMIN — Medication 26 UNIT(S): at 06:58

## 2024-01-01 RX ADMIN — WITCH HAZEL 1 APPLICATION(S): 50 SOLUTION RECTAL at 17:06

## 2024-01-01 RX ADMIN — POVIDONE, POLYVINYL ALCOHOL 1 DROP(S): 20; 27 SOLUTION OPHTHALMIC at 00:16

## 2024-01-01 RX ADMIN — Medication 1 SPRAY(S): at 05:06

## 2024-01-01 RX ADMIN — Medication 250 MILLIGRAM(S): at 06:05

## 2024-01-01 RX ADMIN — POVIDONE, POLYVINYL ALCOHOL 1 DROP(S): 20; 27 SOLUTION OPHTHALMIC at 06:16

## 2024-01-01 RX ADMIN — NYSTATIN 1 APPLICATION(S): 100000 POWDER TOPICAL at 21:20

## 2024-01-01 RX ADMIN — CHLORHEXIDINE GLUCONATE 15 MILLILITER(S): 1.2 RINSE ORAL at 05:21

## 2024-01-01 RX ADMIN — FENTANYL 1 PATCH: 12 PATCH, EXTENDED RELEASE TRANSDERMAL at 08:02

## 2024-01-01 RX ADMIN — Medication 12 UNIT(S): at 12:16

## 2024-01-01 RX ADMIN — Medication 1 SPRAY(S): at 12:22

## 2024-01-01 RX ADMIN — Medication 1: at 18:36

## 2024-01-01 RX ADMIN — IPRATROPIUM BROMIDE AND ALBUTEROL SULFATE 3 MILLILITER(S): 2.5; .5 SOLUTION RESPIRATORY (INHALATION) at 17:32

## 2024-01-01 RX ADMIN — Medication 5 MILLILITER(S): at 23:45

## 2024-01-01 RX ADMIN — ESCITALOPRAM OXALATE 10 MILLIGRAM(S): 10 TABLET, FILM COATED ORAL at 17:23

## 2024-01-01 RX ADMIN — WITCH HAZEL 1 APPLICATION(S): 50 SOLUTION RECTAL at 05:22

## 2024-01-01 RX ADMIN — PREDNISONE 5 MILLIGRAM(S): 20 TABLET ORAL at 11:25

## 2024-01-01 RX ADMIN — ACETAMINOPHEN 500MG 650 MILLIGRAM(S): 500 TABLET, COATED ORAL at 05:01

## 2024-01-01 RX ADMIN — IPRATROPIUM BROMIDE AND ALBUTEROL SULFATE 3 MILLILITER(S): 2.5; .5 SOLUTION RESPIRATORY (INHALATION) at 05:46

## 2024-01-01 RX ADMIN — POVIDONE, POLYVINYL ALCOHOL 1 DROP(S): 20; 27 SOLUTION OPHTHALMIC at 17:45

## 2024-01-01 RX ADMIN — WITCH HAZEL 1 APPLICATION(S): 50 SOLUTION RECTAL at 04:39

## 2024-01-01 RX ADMIN — WITCH HAZEL 1 APPLICATION(S): 50 SOLUTION RECTAL at 18:15

## 2024-01-01 RX ADMIN — IPRATROPIUM BROMIDE AND ALBUTEROL SULFATE 3 MILLILITER(S): 2.5; .5 SOLUTION RESPIRATORY (INHALATION) at 12:42

## 2024-01-01 RX ADMIN — FENTANYL 1 PATCH: 12 PATCH, EXTENDED RELEASE TRANSDERMAL at 19:55

## 2024-01-01 RX ADMIN — FENTANYL 1 PATCH: 12 PATCH, EXTENDED RELEASE TRANSDERMAL at 19:28

## 2024-01-01 RX ADMIN — WITCH HAZEL 1 APPLICATION(S): 50 SOLUTION RECTAL at 18:10

## 2024-01-01 RX ADMIN — POVIDONE, POLYVINYL ALCOHOL 1 DROP(S): 20; 27 SOLUTION OPHTHALMIC at 06:50

## 2024-01-01 RX ADMIN — Medication 1 SPRAY(S): at 22:10

## 2024-01-01 RX ADMIN — PANTOPRAZOLE SODIUM 40 MILLIGRAM(S): 20 TABLET, DELAYED RELEASE ORAL at 08:29

## 2024-01-01 RX ADMIN — NYSTATIN CREAM 1 APPLICATION(S): 100000 CREAM TOPICAL at 05:14

## 2024-01-01 RX ADMIN — Medication 5 UNIT(S): at 05:52

## 2024-01-01 RX ADMIN — POVIDONE, POLYVINYL ALCOHOL 1 DROP(S): 20; 27 SOLUTION OPHTHALMIC at 12:10

## 2024-01-01 RX ADMIN — HYDROMORPHONE HYDROCHLORIDE 0.5 MILLIGRAM(S): 2 TABLET ORAL at 14:53

## 2024-01-01 RX ADMIN — Medication 1 TABLET(S): at 12:30

## 2024-01-01 RX ADMIN — CHLORHEXIDINE GLUCONATE 15 MILLILITER(S): 1.2 RINSE ORAL at 17:39

## 2024-01-01 RX ADMIN — DICLOFENAC SODIUM 2 GRAM(S): 20 SOLUTION TOPICAL at 17:40

## 2024-01-01 RX ADMIN — FENTANYL 1 PATCH: 12 PATCH, EXTENDED RELEASE TRANSDERMAL at 23:47

## 2024-01-01 RX ADMIN — CEFTOLOZANE AND TAZOBACTAM 33.33 MILLIGRAM(S): 1; .5 INJECTION, POWDER, LYOPHILIZED, FOR SOLUTION INTRAVENOUS at 06:22

## 2024-01-01 RX ADMIN — DICLOFENAC SODIUM 2 GRAM(S): 20 SOLUTION TOPICAL at 05:47

## 2024-01-01 RX ADMIN — POVIDONE, POLYVINYL ALCOHOL 1 DROP(S): 20; 27 SOLUTION OPHTHALMIC at 11:17

## 2024-01-01 RX ADMIN — Medication 12.5 MILLIGRAM(S): at 14:38

## 2024-01-01 RX ADMIN — Medication 1 APPLICATION(S): at 22:01

## 2024-01-01 RX ADMIN — DICLOFENAC SODIUM 2 GRAM(S): 20 SOLUTION TOPICAL at 05:55

## 2024-01-01 RX ADMIN — CHLORHEXIDINE GLUCONATE 1 APPLICATION(S): 1.2 RINSE ORAL at 23:27

## 2024-01-01 RX ADMIN — CHLORHEXIDINE GLUCONATE 1 APPLICATION(S): 1.2 RINSE ORAL at 21:37

## 2024-01-01 RX ADMIN — Medication 1 APPLICATION(S): at 21:25

## 2024-01-01 RX ADMIN — ACETAMINOPHEN 500MG 1000 MILLIGRAM(S): 500 TABLET, COATED ORAL at 15:11

## 2024-01-01 RX ADMIN — GABAPENTIN 250 MILLIGRAM(S): 300 CAPSULE ORAL at 21:27

## 2024-01-01 RX ADMIN — Medication 125 MILLIGRAM(S): at 17:41

## 2024-01-01 RX ADMIN — Medication 1 SPRAY(S): at 23:58

## 2024-01-01 RX ADMIN — DICLOFENAC SODIUM 2 GRAM(S): 20 SOLUTION TOPICAL at 18:06

## 2024-01-01 RX ADMIN — INSULIN GLARGINE 14 UNIT(S): 100 INJECTION, SOLUTION SUBCUTANEOUS at 06:26

## 2024-01-01 RX ADMIN — POVIDONE, POLYVINYL ALCOHOL 1 DROP(S): 20; 27 SOLUTION OPHTHALMIC at 17:14

## 2024-01-01 RX ADMIN — INSULIN GLARGINE 20 UNIT(S): 100 INJECTION, SOLUTION SUBCUTANEOUS at 22:37

## 2024-01-01 RX ADMIN — GABAPENTIN 250 MILLIGRAM(S): 300 CAPSULE ORAL at 23:23

## 2024-01-01 RX ADMIN — IPRATROPIUM BROMIDE AND ALBUTEROL SULFATE 3 MILLILITER(S): 2.5; .5 SOLUTION RESPIRATORY (INHALATION) at 17:09

## 2024-01-01 RX ADMIN — PREDNISOLONE ACETATE 1 DROP(S): 1.2 SUSPENSION/ DROPS OPHTHALMIC at 05:05

## 2024-01-01 RX ADMIN — Medication 1 SPRAY(S): at 11:46

## 2024-01-01 RX ADMIN — PREDNISOLONE ACETATE 1 DROP(S): 1.2 SUSPENSION/ DROPS OPHTHALMIC at 17:06

## 2024-01-01 RX ADMIN — METRONIDAZOLE 100 MILLIGRAM(S): 500 TABLET ORAL at 15:07

## 2024-01-01 RX ADMIN — FENTANYL 1 PATCH: 12 PATCH, EXTENDED RELEASE TRANSDERMAL at 08:30

## 2024-01-01 RX ADMIN — LIDOCAINE 4 PATCH: 40 CREAM TOPICAL at 06:34

## 2024-01-01 RX ADMIN — METRONIDAZOLE 100 MILLIGRAM(S): 500 TABLET ORAL at 05:43

## 2024-01-01 RX ADMIN — HYDRALAZINE HYDROCHLORIDE 5 MILLIGRAM(S): 10 TABLET ORAL at 06:11

## 2024-01-01 RX ADMIN — SODIUM CHLORIDE 1 GRAM(S): 9 INJECTION, SOLUTION INTRAMUSCULAR; INTRAVENOUS; SUBCUTANEOUS at 05:17

## 2024-01-01 RX ADMIN — Medication 12.5 MILLIGRAM(S): at 05:26

## 2024-01-01 RX ADMIN — WITCH HAZEL 1 APPLICATION(S): 50 SOLUTION RECTAL at 18:16

## 2024-01-01 RX ADMIN — Medication 5 MILLILITER(S): at 00:14

## 2024-01-01 RX ADMIN — SODIUM CHLORIDE 1 GRAM(S): 9 INJECTION, SOLUTION INTRAMUSCULAR; INTRAVENOUS; SUBCUTANEOUS at 05:45

## 2024-01-01 RX ADMIN — SODIUM CHLORIDE 1 GRAM(S): 9 INJECTION, SOLUTION INTRAMUSCULAR; INTRAVENOUS; SUBCUTANEOUS at 14:34

## 2024-01-01 RX ADMIN — Medication 30 UNIT(S): at 11:49

## 2024-01-01 RX ADMIN — WITCH HAZEL 1 APPLICATION(S): 50 SOLUTION RECTAL at 17:51

## 2024-01-01 RX ADMIN — POVIDONE, POLYVINYL ALCOHOL 1 DROP(S): 20; 27 SOLUTION OPHTHALMIC at 05:20

## 2024-01-01 RX ADMIN — ACETAMINOPHEN 500MG 650 MILLIGRAM(S): 500 TABLET, COATED ORAL at 22:11

## 2024-01-01 RX ADMIN — ACETAMINOPHEN 500MG 500 MILLIGRAM(S): 500 TABLET, COATED ORAL at 23:53

## 2024-01-01 RX ADMIN — Medication 50 MILLILITER(S): at 13:00

## 2024-01-01 RX ADMIN — DICLOFENAC SODIUM 2 GRAM(S): 20 SOLUTION TOPICAL at 23:53

## 2024-01-01 RX ADMIN — PANTOPRAZOLE SODIUM 40 MILLIGRAM(S): 40 TABLET, DELAYED RELEASE ORAL at 15:08

## 2024-01-01 RX ADMIN — CEFEPIME 100 MILLIGRAM(S): 2 INJECTION, POWDER, FOR SOLUTION INTRAVENOUS at 14:26

## 2024-01-01 RX ADMIN — NYSTATIN 1 APPLICATION(S): 100000 POWDER TOPICAL at 22:52

## 2024-01-01 RX ADMIN — Medication 1: at 11:22

## 2024-01-01 RX ADMIN — IPRATROPIUM BROMIDE AND ALBUTEROL SULFATE 3 MILLILITER(S): 2.5; .5 SOLUTION RESPIRATORY (INHALATION) at 18:25

## 2024-01-01 RX ADMIN — DIPHENHYDRAMINE HYDROCHLORIDE AND LIDOCAINE HYDROCHLORIDE AND ALUMINUM HYDROXIDE AND MAGNESIUM HYDRO 5 MILLILITER(S): KIT at 22:57

## 2024-01-01 RX ADMIN — POVIDONE, POLYVINYL ALCOHOL 1 DROP(S): 20; 27 SOLUTION OPHTHALMIC at 18:29

## 2024-01-01 RX ADMIN — Medication 125 MILLIGRAM(S): at 12:04

## 2024-01-01 RX ADMIN — Medication 125 MILLIGRAM(S): at 23:56

## 2024-01-01 RX ADMIN — FENTANYL 1 PATCH: 12 PATCH, EXTENDED RELEASE TRANSDERMAL at 16:21

## 2024-01-01 RX ADMIN — Medication 250 MILLIGRAM(S): at 19:04

## 2024-01-01 RX ADMIN — Medication 1 APPLICATION(S): at 22:04

## 2024-01-01 RX ADMIN — Medication 5 MILLILITER(S): at 11:43

## 2024-01-01 RX ADMIN — DIPHENHYDRAMINE HYDROCHLORIDE AND LIDOCAINE HYDROCHLORIDE AND ALUMINUM HYDROXIDE AND MAGNESIUM HYDRO 5 MILLILITER(S): KIT at 14:17

## 2024-01-01 RX ADMIN — NYSTATIN 1 APPLICATION(S): 100000 POWDER TOPICAL at 21:33

## 2024-01-01 RX ADMIN — CEFEPIME 100 MILLIGRAM(S): 2 INJECTION, POWDER, FOR SOLUTION INTRAVENOUS at 05:36

## 2024-01-01 RX ADMIN — SODIUM CHLORIDE 1 GRAM(S): 9 INJECTION, SOLUTION INTRAMUSCULAR; INTRAVENOUS; SUBCUTANEOUS at 05:16

## 2024-01-01 RX ADMIN — Medication 250 MILLIGRAM(S): at 13:13

## 2024-01-01 RX ADMIN — IPRATROPIUM BROMIDE AND ALBUTEROL SULFATE 3 MILLILITER(S): 2.5; .5 SOLUTION RESPIRATORY (INHALATION) at 11:41

## 2024-01-01 RX ADMIN — CHLORHEXIDINE GLUCONATE 1 APPLICATION(S): 1.2 RINSE ORAL at 22:55

## 2024-01-01 RX ADMIN — Medication 5 MILLILITER(S): at 17:17

## 2024-01-01 RX ADMIN — LIDOCAINE 1 PATCH: 40 CREAM TOPICAL at 22:37

## 2024-01-01 RX ADMIN — ACETAMINOPHEN 500MG 400 MILLIGRAM(S): 500 TABLET, COATED ORAL at 18:31

## 2024-01-01 RX ADMIN — Medication 1: at 00:31

## 2024-01-01 RX ADMIN — CEFEPIME 100 MILLIGRAM(S): 2 INJECTION, POWDER, FOR SOLUTION INTRAVENOUS at 21:39

## 2024-01-01 RX ADMIN — Medication 5 MILLILITER(S): at 17:59

## 2024-01-01 RX ADMIN — CHLORHEXIDINE GLUCONATE 15 MILLILITER(S): 1.2 RINSE ORAL at 06:36

## 2024-01-01 RX ADMIN — WITCH HAZEL 1 APPLICATION(S): 50 SOLUTION RECTAL at 00:13

## 2024-01-01 RX ADMIN — HYDROMORPHONE HYDROCHLORIDE 0.5 MILLIGRAM(S): 2 TABLET ORAL at 00:02

## 2024-01-01 RX ADMIN — Medication 2: at 00:45

## 2024-01-01 RX ADMIN — WITCH HAZEL 1 APPLICATION(S): 50 SOLUTION RECTAL at 19:05

## 2024-01-01 RX ADMIN — LIDOCAINE 4 PATCH: 40 CREAM TOPICAL at 06:47

## 2024-01-01 RX ADMIN — CEFEPIME 100 MILLIGRAM(S): 2 INJECTION, POWDER, FOR SOLUTION INTRAVENOUS at 14:35

## 2024-01-01 RX ADMIN — DICLOFENAC SODIUM 2 GRAM(S): 20 SOLUTION TOPICAL at 05:32

## 2024-01-01 RX ADMIN — Medication 5 MILLILITER(S): at 23:04

## 2024-01-01 RX ADMIN — Medication 50 MILLIGRAM(S): at 22:02

## 2024-01-01 RX ADMIN — IPRATROPIUM BROMIDE AND ALBUTEROL SULFATE 3 MILLILITER(S): 2.5; .5 SOLUTION RESPIRATORY (INHALATION) at 11:06

## 2024-01-01 RX ADMIN — Medication 125 MILLIGRAM(S): at 05:16

## 2024-01-01 RX ADMIN — FENTANYL 1 PATCH: 12 PATCH, EXTENDED RELEASE TRANSDERMAL at 19:45

## 2024-01-01 RX ADMIN — WITCH HAZEL 1 APPLICATION(S): 50 SOLUTION RECTAL at 05:45

## 2024-01-01 RX ADMIN — ACETAMINOPHEN 500MG 650 MILLIGRAM(S): 500 TABLET, COATED ORAL at 22:27

## 2024-01-01 RX ADMIN — POVIDONE, POLYVINYL ALCOHOL 1 DROP(S): 20; 27 SOLUTION OPHTHALMIC at 00:19

## 2024-01-01 RX ADMIN — Medication 1 APPLICATION(S): at 22:32

## 2024-01-01 RX ADMIN — Medication 500 MILLIGRAM(S): at 11:29

## 2024-01-01 RX ADMIN — Medication 1 SPRAY(S): at 18:07

## 2024-01-01 RX ADMIN — Medication 18 UNIT(S): at 12:20

## 2024-01-01 RX ADMIN — POTASSIUM CHLORIDE 100 MILLIEQUIVALENT(S): 600 TABLET, EXTENDED RELEASE ORAL at 10:38

## 2024-01-01 RX ADMIN — Medication 5 MILLILITER(S): at 05:17

## 2024-01-01 RX ADMIN — FENTANYL 1 PATCH: 12 PATCH, EXTENDED RELEASE TRANSDERMAL at 19:51

## 2024-01-01 RX ADMIN — Medication 13 UNIT(S): at 12:18

## 2024-01-01 RX ADMIN — POVIDONE, POLYVINYL ALCOHOL 1 DROP(S): 20; 27 SOLUTION OPHTHALMIC at 17:42

## 2024-01-01 RX ADMIN — Medication 500 MILLIGRAM(S): at 11:21

## 2024-01-01 RX ADMIN — SODIUM CHLORIDE 1 GRAM(S): 9 INJECTION, SOLUTION INTRAMUSCULAR; INTRAVENOUS; SUBCUTANEOUS at 05:40

## 2024-01-01 RX ADMIN — CHLORHEXIDINE GLUCONATE 15 MILLILITER(S): 1.2 RINSE ORAL at 06:17

## 2024-01-01 RX ADMIN — SODIUM CHLORIDE 4 MILLILITER(S): 9 INJECTION, SOLUTION INTRAMUSCULAR; INTRAVENOUS; SUBCUTANEOUS at 11:50

## 2024-01-01 RX ADMIN — Medication 1 APPLICATION(S): at 00:15

## 2024-01-01 RX ADMIN — ESCITALOPRAM OXALATE 10 MILLIGRAM(S): 10 TABLET, FILM COATED ORAL at 19:29

## 2024-01-01 RX ADMIN — PANTOPRAZOLE SODIUM 40 MILLIGRAM(S): 40 TABLET, DELAYED RELEASE ORAL at 05:26

## 2024-01-01 RX ADMIN — Medication 1 TABLET(S): at 12:09

## 2024-01-01 RX ADMIN — IPRATROPIUM BROMIDE AND ALBUTEROL SULFATE 3 MILLILITER(S): 2.5; .5 SOLUTION RESPIRATORY (INHALATION) at 11:23

## 2024-01-01 RX ADMIN — DIPHENHYDRAMINE HYDROCHLORIDE AND LIDOCAINE HYDROCHLORIDE AND ALUMINUM HYDROXIDE AND MAGNESIUM HYDRO 5 MILLILITER(S): KIT at 06:13

## 2024-01-01 RX ADMIN — POVIDONE, POLYVINYL ALCOHOL 1 DROP(S): 20; 27 SOLUTION OPHTHALMIC at 18:04

## 2024-01-01 RX ADMIN — Medication 1 APPLICATION(S): at 21:59

## 2024-01-01 RX ADMIN — NYSTATIN 1 APPLICATION(S): 100000 POWDER TOPICAL at 13:55

## 2024-01-01 RX ADMIN — Medication 1: at 11:34

## 2024-01-01 RX ADMIN — INSULIN GLARGINE 10 UNIT(S): 100 INJECTION, SOLUTION SUBCUTANEOUS at 06:57

## 2024-01-01 RX ADMIN — CEFEPIME 100 MILLIGRAM(S): 2 INJECTION, POWDER, FOR SOLUTION INTRAVENOUS at 18:21

## 2024-01-01 RX ADMIN — LIDOCAINE 4 PATCH: 40 CREAM TOPICAL at 17:43

## 2024-01-01 RX ADMIN — DICLOFENAC SODIUM 2 GRAM(S): 20 SOLUTION TOPICAL at 06:03

## 2024-01-01 RX ADMIN — ACETAMINOPHEN, DIPHENHYDRAMINE HCL, PHENYLEPHRINE HCL 12 MILLIGRAM(S): 325; 25; 5 TABLET ORAL at 22:03

## 2024-01-01 RX ADMIN — Medication 30 UNIT(S): at 16:09

## 2024-01-01 RX ADMIN — Medication 1 APPLICATION(S): at 17:31

## 2024-01-01 RX ADMIN — Medication 1 MILLIGRAM(S): at 21:53

## 2024-01-01 RX ADMIN — Medication 1 SPRAY(S): at 12:09

## 2024-01-01 RX ADMIN — LIDOCAINE 4 PATCH: 40 CREAM TOPICAL at 19:56

## 2024-01-01 RX ADMIN — HYDRALAZINE HYDROCHLORIDE 10 MILLIGRAM(S): 10 TABLET ORAL at 06:16

## 2024-01-01 RX ADMIN — SODIUM CHLORIDE 1 GRAM(S): 9 INJECTION, SOLUTION INTRAMUSCULAR; INTRAVENOUS; SUBCUTANEOUS at 17:35

## 2024-01-01 RX ADMIN — Medication 500 MILLIGRAM(S): at 11:22

## 2024-01-01 RX ADMIN — SODIUM CHLORIDE 1 GRAM(S): 9 INJECTION, SOLUTION INTRAMUSCULAR; INTRAVENOUS; SUBCUTANEOUS at 21:47

## 2024-01-01 RX ADMIN — POVIDONE, POLYVINYL ALCOHOL 1 DROP(S): 20; 27 SOLUTION OPHTHALMIC at 17:10

## 2024-01-01 RX ADMIN — Medication 5 MILLILITER(S): at 12:58

## 2024-01-01 RX ADMIN — INSULIN GLARGINE 20 UNIT(S): 100 INJECTION, SOLUTION SUBCUTANEOUS at 22:28

## 2024-01-01 RX ADMIN — Medication 1 TABLET(S): at 12:16

## 2024-01-01 RX ADMIN — LIDOCAINE 1 PATCH: 40 CREAM TOPICAL at 15:41

## 2024-01-01 RX ADMIN — ACETAMINOPHEN 500MG 650 MILLIGRAM(S): 500 TABLET, COATED ORAL at 13:59

## 2024-01-01 RX ADMIN — DICLOFENAC SODIUM 2 GRAM(S): 20 SOLUTION TOPICAL at 00:52

## 2024-01-01 RX ADMIN — FENTANYL 1 PATCH: 12 PATCH, EXTENDED RELEASE TRANSDERMAL at 14:54

## 2024-01-01 RX ADMIN — Medication 1 APPLICATION(S): at 21:44

## 2024-01-01 RX ADMIN — Medication 1 TABLET(S): at 10:05

## 2024-01-01 RX ADMIN — PREDNISOLONE ACETATE 1 DROP(S): 1.2 SUSPENSION/ DROPS OPHTHALMIC at 12:14

## 2024-01-01 RX ADMIN — CHLORHEXIDINE GLUCONATE 15 MILLILITER(S): 1.2 RINSE ORAL at 18:23

## 2024-01-01 RX ADMIN — ACETAMINOPHEN 500MG 650 MILLIGRAM(S): 500 TABLET, COATED ORAL at 23:44

## 2024-01-01 RX ADMIN — LIDOCAINE 4 PATCH: 40 CREAM TOPICAL at 03:06

## 2024-01-01 RX ADMIN — SODIUM CHLORIDE 1 GRAM(S): 9 INJECTION, SOLUTION INTRAMUSCULAR; INTRAVENOUS; SUBCUTANEOUS at 23:25

## 2024-01-01 RX ADMIN — DICLOFENAC SODIUM 2 GRAM(S): 20 SOLUTION TOPICAL at 23:33

## 2024-01-01 RX ADMIN — ACETAMINOPHEN 500MG 1000 MILLIGRAM(S): 500 TABLET, COATED ORAL at 06:30

## 2024-01-01 RX ADMIN — PREDNISONE 5 MILLIGRAM(S): 20 TABLET ORAL at 04:38

## 2024-01-01 RX ADMIN — SODIUM CHLORIDE 1 GRAM(S): 9 INJECTION, SOLUTION INTRAMUSCULAR; INTRAVENOUS; SUBCUTANEOUS at 17:05

## 2024-01-01 RX ADMIN — Medication 125 MICROGRAM(S): at 05:26

## 2024-01-01 RX ADMIN — IPRATROPIUM BROMIDE AND ALBUTEROL SULFATE 3 MILLILITER(S): 2.5; .5 SOLUTION RESPIRATORY (INHALATION) at 11:15

## 2024-01-01 RX ADMIN — PREDNISOLONE ACETATE 1 DROP(S): 1.2 SUSPENSION/ DROPS OPHTHALMIC at 22:09

## 2024-01-01 RX ADMIN — DIPHENHYDRAMINE HYDROCHLORIDE AND LIDOCAINE HYDROCHLORIDE AND ALUMINUM HYDROXIDE AND MAGNESIUM HYDRO 5 MILLILITER(S): KIT at 06:18

## 2024-01-01 RX ADMIN — WITCH HAZEL 1 APPLICATION(S): 50 SOLUTION RECTAL at 05:59

## 2024-01-01 RX ADMIN — ACETAMINOPHEN 500MG 500 MILLIGRAM(S): 500 TABLET, COATED ORAL at 08:01

## 2024-01-01 RX ADMIN — METRONIDAZOLE 100 MILLIGRAM(S): 500 TABLET ORAL at 22:54

## 2024-01-01 RX ADMIN — IPRATROPIUM BROMIDE AND ALBUTEROL SULFATE 3 MILLILITER(S): 2.5; .5 SOLUTION RESPIRATORY (INHALATION) at 11:08

## 2024-01-01 RX ADMIN — DICLOFENAC SODIUM 2 GRAM(S): 20 SOLUTION TOPICAL at 12:26

## 2024-01-01 RX ADMIN — Medication 1: at 06:10

## 2024-01-01 RX ADMIN — LIDOCAINE 1 PATCH: 40 CREAM TOPICAL at 21:12

## 2024-01-01 RX ADMIN — CHLORHEXIDINE GLUCONATE 1 APPLICATION(S): 1.2 RINSE ORAL at 02:09

## 2024-01-01 RX ADMIN — Medication 50 MILLIGRAM(S): at 13:39

## 2024-01-01 RX ADMIN — DIPHENHYDRAMINE HYDROCHLORIDE AND LIDOCAINE HYDROCHLORIDE AND ALUMINUM HYDROXIDE AND MAGNESIUM HYDRO 5 MILLILITER(S): KIT at 21:28

## 2024-01-01 RX ADMIN — DICLOFENAC SODIUM 2 GRAM(S): 20 SOLUTION TOPICAL at 12:24

## 2024-01-01 RX ADMIN — DICLOFENAC SODIUM 2 GRAM(S): 20 SOLUTION TOPICAL at 18:00

## 2024-01-01 RX ADMIN — POTASSIUM CHLORIDE 100 MILLIEQUIVALENT(S): 600 TABLET, EXTENDED RELEASE ORAL at 12:14

## 2024-01-01 RX ADMIN — IPRATROPIUM BROMIDE AND ALBUTEROL SULFATE 3 MILLILITER(S): 2.5; .5 SOLUTION RESPIRATORY (INHALATION) at 18:37

## 2024-01-01 RX ADMIN — Medication 1 SPRAY(S): at 12:03

## 2024-01-01 RX ADMIN — Medication 14 UNIT(S): at 18:16

## 2024-01-01 RX ADMIN — FENTANYL 1 PATCH: 12 PATCH, EXTENDED RELEASE TRANSDERMAL at 21:54

## 2024-01-01 RX ADMIN — ACETAMINOPHEN 500MG 650 MILLIGRAM(S): 500 TABLET, COATED ORAL at 06:50

## 2024-01-01 RX ADMIN — SODIUM CHLORIDE 1 GRAM(S): 9 INJECTION, SOLUTION INTRAMUSCULAR; INTRAVENOUS; SUBCUTANEOUS at 05:20

## 2024-01-01 RX ADMIN — IPRATROPIUM BROMIDE AND ALBUTEROL SULFATE 3 MILLILITER(S): 2.5; .5 SOLUTION RESPIRATORY (INHALATION) at 23:47

## 2024-01-01 RX ADMIN — INSULIN GLARGINE 10 UNIT(S): 100 INJECTION, SOLUTION SUBCUTANEOUS at 06:21

## 2024-01-01 RX ADMIN — Medication 12.5 MILLIGRAM(S): at 22:10

## 2024-01-01 RX ADMIN — Medication 1 TABLET(S): at 10:28

## 2024-01-01 RX ADMIN — HYDRALAZINE HYDROCHLORIDE 10 MILLIGRAM(S): 10 TABLET ORAL at 13:10

## 2024-01-01 RX ADMIN — MEROPENEM 100 MILLIGRAM(S): 500 INJECTION, POWDER, FOR SOLUTION INTRAVENOUS at 05:49

## 2024-01-01 RX ADMIN — CEFTOLOZANE AND TAZOBACTAM 33.33 MILLIGRAM(S): 1; .5 INJECTION, POWDER, LYOPHILIZED, FOR SOLUTION INTRAVENOUS at 05:57

## 2024-01-01 RX ADMIN — Medication 1 SPRAY(S): at 12:30

## 2024-01-01 RX ADMIN — NYSTATIN 1 APPLICATION(S): 100000 POWDER TOPICAL at 14:39

## 2024-01-01 RX ADMIN — CHLORHEXIDINE GLUCONATE 15 MILLILITER(S): 1.2 RINSE ORAL at 17:58

## 2024-01-01 RX ADMIN — Medication 2 DROP(S): at 05:13

## 2024-01-01 RX ADMIN — Medication 250 MILLIGRAM(S): at 18:43

## 2024-01-01 RX ADMIN — Medication 1 PATCH: at 01:32

## 2024-01-01 RX ADMIN — ACETAMINOPHEN 500MG 1000 MILLIGRAM(S): 500 TABLET, COATED ORAL at 00:50

## 2024-01-01 RX ADMIN — CEFEPIME 100 MILLIGRAM(S): 2 INJECTION, POWDER, FOR SOLUTION INTRAVENOUS at 22:51

## 2024-01-01 RX ADMIN — POVIDONE, POLYVINYL ALCOHOL 1 DROP(S): 20; 27 SOLUTION OPHTHALMIC at 22:05

## 2024-01-01 RX ADMIN — IPRATROPIUM BROMIDE AND ALBUTEROL SULFATE 3 MILLILITER(S): 2.5; .5 SOLUTION RESPIRATORY (INHALATION) at 06:01

## 2024-01-01 RX ADMIN — PREDNISOLONE ACETATE 1 DROP(S): 1.2 SUSPENSION/ DROPS OPHTHALMIC at 05:01

## 2024-01-01 RX ADMIN — PANTOPRAZOLE SODIUM 40 MILLIGRAM(S): 40 TABLET, DELAYED RELEASE ORAL at 05:15

## 2024-01-01 RX ADMIN — OXYCODONE HYDROCHLORIDE 2.5 MILLIGRAM(S): 30 TABLET ORAL at 04:35

## 2024-01-01 RX ADMIN — DICLOFENAC SODIUM 2 GRAM(S): 20 SOLUTION TOPICAL at 22:44

## 2024-01-01 RX ADMIN — Medication 50 MILLILITER(S): at 08:00

## 2024-01-01 RX ADMIN — POTASSIUM CHLORIDE 40 MILLIEQUIVALENT(S): 600 TABLET, EXTENDED RELEASE ORAL at 12:06

## 2024-01-01 RX ADMIN — NYSTATIN 1 APPLICATION(S): 100000 POWDER TOPICAL at 04:39

## 2024-01-01 RX ADMIN — Medication 125 MILLIGRAM(S): at 12:11

## 2024-01-01 RX ADMIN — POVIDONE, POLYVINYL ALCOHOL 1 DROP(S): 20; 27 SOLUTION OPHTHALMIC at 00:08

## 2024-01-01 RX ADMIN — Medication 1 APPLICATION(S): at 21:32

## 2024-01-01 RX ADMIN — PREDNISOLONE ACETATE 1 DROP(S): 1.2 SUSPENSION/ DROPS OPHTHALMIC at 11:04

## 2024-01-01 RX ADMIN — FENTANYL 1 PATCH: 12 PATCH, EXTENDED RELEASE TRANSDERMAL at 19:05

## 2024-01-01 RX ADMIN — Medication 12.5 MILLIGRAM(S): at 14:39

## 2024-01-01 RX ADMIN — IPRATROPIUM BROMIDE AND ALBUTEROL SULFATE 3 MILLILITER(S): 2.5; .5 SOLUTION RESPIRATORY (INHALATION) at 23:10

## 2024-01-01 RX ADMIN — TRIAMCINOLONE ACETONIDE 1 APPLICATION(S): 0.15 AEROSOL, SPRAY TOPICAL at 22:49

## 2024-01-01 RX ADMIN — TRIAMCINOLONE ACETONIDE 1 APPLICATION(S): 0.15 AEROSOL, SPRAY TOPICAL at 22:31

## 2024-01-01 RX ADMIN — POTASSIUM CHLORIDE 100 MILLIEQUIVALENT(S): 600 TABLET, EXTENDED RELEASE ORAL at 15:54

## 2024-01-01 RX ADMIN — POVIDONE, POLYVINYL ALCOHOL 1 DROP(S): 20; 27 SOLUTION OPHTHALMIC at 05:11

## 2024-01-01 RX ADMIN — Medication 25 MILLIGRAM(S): at 21:39

## 2024-01-01 RX ADMIN — Medication 1 SPRAY(S): at 05:50

## 2024-01-01 RX ADMIN — INSULIN GLARGINE 14 UNIT(S): 100 INJECTION, SOLUTION SUBCUTANEOUS at 06:51

## 2024-01-01 RX ADMIN — LIDOCAINE 1 PATCH: 40 CREAM TOPICAL at 06:36

## 2024-01-01 RX ADMIN — ACETAMINOPHEN 500MG 400 MILLIGRAM(S): 500 TABLET, COATED ORAL at 09:36

## 2024-01-01 RX ADMIN — Medication 1 SPRAY(S): at 17:40

## 2024-01-01 RX ADMIN — FENTANYL 1 PATCH: 12 PATCH, EXTENDED RELEASE TRANSDERMAL at 18:03

## 2024-01-01 RX ADMIN — MEROPENEM 100 MILLIGRAM(S): 500 INJECTION, POWDER, FOR SOLUTION INTRAVENOUS at 23:18

## 2024-01-01 RX ADMIN — CHLORHEXIDINE GLUCONATE 1 APPLICATION(S): 1.2 RINSE ORAL at 23:09

## 2024-01-01 RX ADMIN — PANTOPRAZOLE SODIUM 40 MILLIGRAM(S): 40 TABLET, DELAYED RELEASE ORAL at 05:49

## 2024-01-01 RX ADMIN — Medication 125 MICROGRAM(S): at 05:18

## 2024-01-01 RX ADMIN — DICLOFENAC SODIUM 2 GRAM(S): 20 SOLUTION TOPICAL at 11:06

## 2024-01-01 RX ADMIN — Medication 1 SPRAY(S): at 18:50

## 2024-01-01 RX ADMIN — CHLORHEXIDINE GLUCONATE 15 MILLILITER(S): 1.2 RINSE ORAL at 18:01

## 2024-01-01 RX ADMIN — Medication 4: at 18:09

## 2024-01-01 RX ADMIN — POVIDONE, POLYVINYL ALCOHOL 1 DROP(S): 20; 27 SOLUTION OPHTHALMIC at 17:09

## 2024-01-01 RX ADMIN — Medication 5 MILLILITER(S): at 11:30

## 2024-01-01 RX ADMIN — Medication 5 MILLILITER(S): at 12:22

## 2024-01-01 RX ADMIN — Medication 20 UNIT(S): at 12:16

## 2024-01-01 RX ADMIN — DICLOFENAC SODIUM 2 GRAM(S): 20 SOLUTION TOPICAL at 00:23

## 2024-01-01 RX ADMIN — CHLORHEXIDINE GLUCONATE 15 MILLILITER(S): 1.2 RINSE ORAL at 05:22

## 2024-01-01 RX ADMIN — LIDOCAINE 1 PATCH: 40 CREAM TOPICAL at 06:18

## 2024-01-01 RX ADMIN — Medication 8: at 17:39

## 2024-01-01 RX ADMIN — LIDOCAINE 4 PATCH: 40 CREAM TOPICAL at 14:21

## 2024-01-01 RX ADMIN — POVIDONE, POLYVINYL ALCOHOL 1 DROP(S): 20; 27 SOLUTION OPHTHALMIC at 13:11

## 2024-01-01 RX ADMIN — ACETAMINOPHEN 500MG 650 MILLIGRAM(S): 500 TABLET, COATED ORAL at 15:12

## 2024-01-01 RX ADMIN — ACETAMINOPHEN 500MG 400 MILLIGRAM(S): 500 TABLET, COATED ORAL at 08:22

## 2024-01-01 RX ADMIN — Medication 5 MILLILITER(S): at 06:18

## 2024-01-01 RX ADMIN — IPRATROPIUM BROMIDE AND ALBUTEROL SULFATE 3 MILLILITER(S): 2.5; .5 SOLUTION RESPIRATORY (INHALATION) at 11:07

## 2024-01-01 RX ADMIN — Medication 5 MILLILITER(S): at 05:42

## 2024-01-01 RX ADMIN — Medication 5 MILLILITER(S): at 11:35

## 2024-01-01 RX ADMIN — POVIDONE, POLYVINYL ALCOHOL 1 DROP(S): 20; 27 SOLUTION OPHTHALMIC at 05:19

## 2024-01-01 RX ADMIN — ESCITALOPRAM OXALATE 10 MILLIGRAM(S): 10 TABLET, FILM COATED ORAL at 17:25

## 2024-01-01 RX ADMIN — Medication 2: at 07:50

## 2024-01-01 RX ADMIN — DICLOFENAC SODIUM 2 GRAM(S): 20 SOLUTION TOPICAL at 12:22

## 2024-01-01 RX ADMIN — DICLOFENAC SODIUM 2 GRAM(S): 20 SOLUTION TOPICAL at 06:06

## 2024-01-01 RX ADMIN — NYSTATIN 1 APPLICATION(S): 100000 POWDER TOPICAL at 05:12

## 2024-01-01 RX ADMIN — ACETAMINOPHEN, DIPHENHYDRAMINE HCL, PHENYLEPHRINE HCL 12 MILLIGRAM(S): 325; 25; 5 TABLET ORAL at 21:22

## 2024-01-01 RX ADMIN — Medication 18 UNIT(S): at 06:20

## 2024-01-01 RX ADMIN — Medication 125 MILLIGRAM(S): at 00:54

## 2024-01-01 RX ADMIN — Medication 12.5 MILLIGRAM(S): at 21:28

## 2024-01-01 RX ADMIN — Medication 5 MILLILITER(S): at 12:44

## 2024-01-01 RX ADMIN — SODIUM CHLORIDE 4 MILLILITER(S): 9 INJECTION, SOLUTION INTRAMUSCULAR; INTRAVENOUS; SUBCUTANEOUS at 14:57

## 2024-01-01 RX ADMIN — NYSTATIN 1 APPLICATION(S): 100000 POWDER TOPICAL at 13:14

## 2024-01-01 RX ADMIN — POVIDONE, POLYVINYL ALCOHOL 1 DROP(S): 20; 27 SOLUTION OPHTHALMIC at 05:24

## 2024-01-01 RX ADMIN — NYSTATIN 1 APPLICATION(S): 100000 POWDER TOPICAL at 13:13

## 2024-01-01 RX ADMIN — PREDNISOLONE ACETATE 1 DROP(S): 1.2 SUSPENSION/ DROPS OPHTHALMIC at 12:24

## 2024-01-01 RX ADMIN — LIDOCAINE 1 PATCH: 40 CREAM TOPICAL at 07:19

## 2024-01-01 RX ADMIN — POVIDONE, POLYVINYL ALCOHOL 1 DROP(S): 20; 27 SOLUTION OPHTHALMIC at 23:34

## 2024-01-01 RX ADMIN — Medication 125 MILLIGRAM(S): at 18:04

## 2024-01-01 RX ADMIN — ACETAMINOPHEN 500MG 650 MILLIGRAM(S): 500 TABLET, COATED ORAL at 23:30

## 2024-01-01 RX ADMIN — IPRATROPIUM BROMIDE AND ALBUTEROL SULFATE 3 MILLILITER(S): 2.5; .5 SOLUTION RESPIRATORY (INHALATION) at 17:43

## 2024-01-01 RX ADMIN — PANTOPRAZOLE SODIUM 40 MILLIGRAM(S): 40 TABLET, DELAYED RELEASE ORAL at 17:37

## 2024-01-01 RX ADMIN — DICLOFENAC SODIUM 2 GRAM(S): 20 SOLUTION TOPICAL at 05:18

## 2024-01-01 RX ADMIN — IPRATROPIUM BROMIDE AND ALBUTEROL SULFATE 3 MILLILITER(S): 2.5; .5 SOLUTION RESPIRATORY (INHALATION) at 06:12

## 2024-01-01 RX ADMIN — Medication 26 UNIT(S): at 12:23

## 2024-01-01 RX ADMIN — POTASSIUM PHOSPHATE, MONOBASIC POTASSIUM PHOSPHATE, DIBASIC INJECTION, 83.33 MILLIMOLE(S): 236; 224 SOLUTION, CONCENTRATE INTRAVENOUS at 19:55

## 2024-01-01 RX ADMIN — HYDRALAZINE HYDROCHLORIDE 10 MILLIGRAM(S): 10 TABLET ORAL at 00:30

## 2024-01-01 RX ADMIN — Medication 5 MILLILITER(S): at 18:24

## 2024-01-01 RX ADMIN — IPRATROPIUM BROMIDE AND ALBUTEROL SULFATE 3 MILLILITER(S): 2.5; .5 SOLUTION RESPIRATORY (INHALATION) at 23:14

## 2024-01-01 RX ADMIN — MEROPENEM 100 MILLIGRAM(S): 500 INJECTION, POWDER, FOR SOLUTION INTRAVENOUS at 05:13

## 2024-01-01 RX ADMIN — Medication 125 MICROGRAM(S): at 04:41

## 2024-01-01 RX ADMIN — POVIDONE, POLYVINYL ALCOHOL 1 DROP(S): 20; 27 SOLUTION OPHTHALMIC at 17:05

## 2024-01-01 RX ADMIN — PANTOPRAZOLE SODIUM 40 MILLIGRAM(S): 40 TABLET, DELAYED RELEASE ORAL at 18:00

## 2024-01-01 RX ADMIN — IPRATROPIUM BROMIDE AND ALBUTEROL SULFATE 3 MILLILITER(S): 2.5; .5 SOLUTION RESPIRATORY (INHALATION) at 05:30

## 2024-01-01 RX ADMIN — PREDNISONE 5 MILLIGRAM(S): 20 TABLET ORAL at 12:04

## 2024-01-01 RX ADMIN — ESCITALOPRAM OXALATE 10 MILLIGRAM(S): 10 TABLET, FILM COATED ORAL at 17:38

## 2024-01-01 RX ADMIN — Medication 5: at 12:06

## 2024-01-01 RX ADMIN — POTASSIUM CHLORIDE 20 MILLIEQUIVALENT(S): 600 TABLET, EXTENDED RELEASE ORAL at 13:44

## 2024-01-01 RX ADMIN — FENTANYL 1 PATCH: 12 PATCH, EXTENDED RELEASE TRANSDERMAL at 19:57

## 2024-01-01 RX ADMIN — Medication 1 SPRAY(S): at 17:46

## 2024-01-01 RX ADMIN — HYDROMORPHONE HYDROCHLORIDE 0.5 MILLIGRAM(S): 2 TABLET ORAL at 03:47

## 2024-01-01 RX ADMIN — METRONIDAZOLE 100 MILLIGRAM(S): 500 TABLET ORAL at 21:39

## 2024-01-01 RX ADMIN — Medication 5 MILLILITER(S): at 07:08

## 2024-01-01 RX ADMIN — FENTANYL 1 PATCH: 12 PATCH, EXTENDED RELEASE TRANSDERMAL at 06:55

## 2024-01-01 RX ADMIN — Medication 1 APPLICATION(S): at 21:15

## 2024-01-01 RX ADMIN — PANTOPRAZOLE SODIUM 40 MILLIGRAM(S): 40 TABLET, DELAYED RELEASE ORAL at 18:15

## 2024-01-01 RX ADMIN — PREDNISOLONE ACETATE 1 DROP(S): 1.2 SUSPENSION/ DROPS OPHTHALMIC at 18:32

## 2024-01-01 RX ADMIN — SODIUM CHLORIDE 1 GRAM(S): 9 INJECTION, SOLUTION INTRAMUSCULAR; INTRAVENOUS; SUBCUTANEOUS at 14:27

## 2024-01-01 RX ADMIN — Medication 5 MILLILITER(S): at 18:06

## 2024-01-01 RX ADMIN — NYSTATIN 1 APPLICATION(S): 100000 POWDER TOPICAL at 05:49

## 2024-01-01 RX ADMIN — Medication 500 MILLIGRAM(S): at 12:11

## 2024-01-01 RX ADMIN — POVIDONE, POLYVINYL ALCOHOL 1 DROP(S): 20; 27 SOLUTION OPHTHALMIC at 11:53

## 2024-01-01 RX ADMIN — ACETAMINOPHEN 500MG 1000 MILLIGRAM(S): 500 TABLET, COATED ORAL at 22:23

## 2024-01-01 RX ADMIN — PANTOPRAZOLE SODIUM 40 MILLIGRAM(S): 40 TABLET, DELAYED RELEASE ORAL at 17:25

## 2024-01-01 RX ADMIN — IPRATROPIUM BROMIDE AND ALBUTEROL SULFATE 3 MILLILITER(S): 2.5; .5 SOLUTION RESPIRATORY (INHALATION) at 00:25

## 2024-01-01 RX ADMIN — IPRATROPIUM BROMIDE AND ALBUTEROL SULFATE 3 MILLILITER(S): 2.5; .5 SOLUTION RESPIRATORY (INHALATION) at 05:15

## 2024-01-01 RX ADMIN — Medication 12.5 MILLIGRAM(S): at 05:14

## 2024-01-01 RX ADMIN — Medication 1 TABLET(S): at 11:30

## 2024-01-01 RX ADMIN — ACETAMINOPHEN 500MG 400 MILLIGRAM(S): 500 TABLET, COATED ORAL at 17:53

## 2024-01-01 RX ADMIN — POVIDONE, POLYVINYL ALCOHOL 1 DROP(S): 20; 27 SOLUTION OPHTHALMIC at 12:04

## 2024-01-01 RX ADMIN — CHLORHEXIDINE GLUCONATE 15 MILLILITER(S): 1.2 RINSE ORAL at 05:50

## 2024-01-01 RX ADMIN — ACETAMINOPHEN 500MG 400 MILLIGRAM(S): 500 TABLET, COATED ORAL at 14:16

## 2024-01-01 RX ADMIN — CHLORHEXIDINE GLUCONATE 1 APPLICATION(S): 1.2 RINSE ORAL at 22:57

## 2024-01-01 RX ADMIN — WITCH HAZEL 1 APPLICATION(S): 50 SOLUTION RECTAL at 18:08

## 2024-01-01 RX ADMIN — IPRATROPIUM BROMIDE AND ALBUTEROL SULFATE 3 MILLILITER(S): 2.5; .5 SOLUTION RESPIRATORY (INHALATION) at 11:54

## 2024-01-01 RX ADMIN — Medication 1 APPLICATION(S): at 21:23

## 2024-01-01 RX ADMIN — FENTANYL 1 PATCH: 12 PATCH, EXTENDED RELEASE TRANSDERMAL at 19:54

## 2024-01-01 RX ADMIN — FENTANYL 1 PATCH: 12 PATCH, EXTENDED RELEASE TRANSDERMAL at 00:37

## 2024-01-01 RX ADMIN — Medication 1 SPRAY(S): at 06:06

## 2024-01-01 RX ADMIN — Medication 5 MILLIGRAM(S): at 21:53

## 2024-01-01 RX ADMIN — NYSTATIN 1 APPLICATION(S): 100000 POWDER TOPICAL at 21:57

## 2024-01-01 RX ADMIN — POVIDONE, POLYVINYL ALCOHOL 1 DROP(S): 20; 27 SOLUTION OPHTHALMIC at 05:26

## 2024-01-01 RX ADMIN — Medication 5 MILLILITER(S): at 18:30

## 2024-01-01 RX ADMIN — POVIDONE, POLYVINYL ALCOHOL 1 DROP(S): 20; 27 SOLUTION OPHTHALMIC at 18:01

## 2024-01-01 RX ADMIN — NYSTATIN 1 APPLICATION(S): 100000 POWDER TOPICAL at 21:40

## 2024-01-01 RX ADMIN — CHLORHEXIDINE GLUCONATE 15 MILLILITER(S): 1.2 RINSE ORAL at 17:19

## 2024-01-01 RX ADMIN — PREDNISOLONE ACETATE 1 DROP(S): 1.2 SUSPENSION/ DROPS OPHTHALMIC at 00:19

## 2024-01-01 RX ADMIN — Medication 50 MILLIGRAM(S): at 06:31

## 2024-01-01 RX ADMIN — DICLOFENAC SODIUM 2 GRAM(S): 20 SOLUTION TOPICAL at 11:32

## 2024-01-01 RX ADMIN — Medication 125 MILLIGRAM(S): at 17:42

## 2024-01-01 RX ADMIN — LIDOCAINE 4 PATCH: 40 CREAM TOPICAL at 04:38

## 2024-01-01 RX ADMIN — POVIDONE, POLYVINYL ALCOHOL 1 DROP(S): 20; 27 SOLUTION OPHTHALMIC at 17:23

## 2024-01-01 RX ADMIN — PREDNISONE 5 MILLIGRAM(S): 20 TABLET ORAL at 05:36

## 2024-01-01 RX ADMIN — LIDOCAINE 4 PATCH: 40 CREAM TOPICAL at 18:03

## 2024-01-01 RX ADMIN — WITCH HAZEL 1 APPLICATION(S): 50 SOLUTION RECTAL at 17:25

## 2024-01-01 RX ADMIN — PREDNISOLONE ACETATE 1 DROP(S): 1.2 SUSPENSION/ DROPS OPHTHALMIC at 17:51

## 2024-01-01 RX ADMIN — POVIDONE, POLYVINYL ALCOHOL 1 DROP(S): 20; 27 SOLUTION OPHTHALMIC at 18:32

## 2024-01-01 RX ADMIN — GABAPENTIN 250 MILLIGRAM(S): 300 CAPSULE ORAL at 21:46

## 2024-01-01 RX ADMIN — PREDNISONE 5 MILLIGRAM(S): 20 TABLET ORAL at 11:18

## 2024-01-01 RX ADMIN — POVIDONE, POLYVINYL ALCOHOL 1 DROP(S): 20; 27 SOLUTION OPHTHALMIC at 12:17

## 2024-01-01 RX ADMIN — Medication 1 TABLET(S): at 20:50

## 2024-01-01 RX ADMIN — LIDOCAINE 4 PATCH: 40 CREAM TOPICAL at 09:19

## 2024-01-01 RX ADMIN — INSULIN GLARGINE 18 UNIT(S): 100 INJECTION, SOLUTION SUBCUTANEOUS at 06:43

## 2024-01-01 RX ADMIN — Medication 50 MILLILITER(S): at 11:45

## 2024-01-01 RX ADMIN — CHLORHEXIDINE GLUCONATE 15 MILLILITER(S): 1.2 RINSE ORAL at 18:05

## 2024-01-01 RX ADMIN — Medication 12.5 MILLIGRAM(S): at 18:07

## 2024-01-01 RX ADMIN — CHLORHEXIDINE GLUCONATE 15 MILLILITER(S): 1.2 RINSE ORAL at 17:25

## 2024-01-01 RX ADMIN — LIDOCAINE 1 PATCH: 40 CREAM TOPICAL at 05:36

## 2024-01-01 RX ADMIN — GABAPENTIN 250 MILLIGRAM(S): 300 CAPSULE ORAL at 21:51

## 2024-01-01 RX ADMIN — Medication 2 MILLIGRAM(S): at 09:19

## 2024-01-01 RX ADMIN — Medication 25 GRAM(S): at 04:29

## 2024-01-01 RX ADMIN — Medication 1 APPLICATION(S): at 22:07

## 2024-01-01 RX ADMIN — NYSTATIN 1 APPLICATION(S): 100000 POWDER TOPICAL at 22:56

## 2024-01-01 RX ADMIN — FENTANYL 1 PATCH: 12 PATCH, EXTENDED RELEASE TRANSDERMAL at 00:10

## 2024-01-01 RX ADMIN — Medication 125 MILLIGRAM(S): at 06:47

## 2024-01-01 RX ADMIN — IPRATROPIUM BROMIDE AND ALBUTEROL SULFATE 3 MILLILITER(S): 2.5; .5 SOLUTION RESPIRATORY (INHALATION) at 05:55

## 2024-01-01 RX ADMIN — ACETAMINOPHEN, DIPHENHYDRAMINE HCL, PHENYLEPHRINE HCL 12 MILLIGRAM(S): 325; 25; 5 TABLET ORAL at 22:27

## 2024-01-01 RX ADMIN — DICLOFENAC SODIUM 2 GRAM(S): 20 SOLUTION TOPICAL at 17:26

## 2024-01-01 RX ADMIN — IPRATROPIUM BROMIDE AND ALBUTEROL SULFATE 3 MILLILITER(S): 2.5; .5 SOLUTION RESPIRATORY (INHALATION) at 17:05

## 2024-01-01 RX ADMIN — WITCH HAZEL 1 APPLICATION(S): 50 SOLUTION RECTAL at 18:19

## 2024-01-01 RX ADMIN — CHLORHEXIDINE GLUCONATE 15 MILLILITER(S): 1.2 RINSE ORAL at 05:20

## 2024-01-01 RX ADMIN — DICLOFENAC SODIUM 2 GRAM(S): 20 SOLUTION TOPICAL at 06:12

## 2024-01-01 RX ADMIN — IPRATROPIUM BROMIDE AND ALBUTEROL SULFATE 3 MILLILITER(S): 2.5; .5 SOLUTION RESPIRATORY (INHALATION) at 00:42

## 2024-01-01 RX ADMIN — CEFTOLOZANE AND TAZOBACTAM 33.33 MILLIGRAM(S): 1; .5 INJECTION, POWDER, LYOPHILIZED, FOR SOLUTION INTRAVENOUS at 14:35

## 2024-01-01 RX ADMIN — Medication 125 MICROGRAM(S): at 04:25

## 2024-01-01 RX ADMIN — DICLOFENAC SODIUM 2 GRAM(S): 20 SOLUTION TOPICAL at 14:00

## 2024-01-01 RX ADMIN — GABAPENTIN 250 MILLIGRAM(S): 300 CAPSULE ORAL at 22:00

## 2024-01-01 RX ADMIN — ACETAMINOPHEN, DIPHENHYDRAMINE HCL, PHENYLEPHRINE HCL 12 MILLIGRAM(S): 325; 25; 5 TABLET ORAL at 00:16

## 2024-01-01 RX ADMIN — Medication 7 UNIT(S): at 06:09

## 2024-01-01 RX ADMIN — SODIUM CHLORIDE 1 GRAM(S): 9 INJECTION, SOLUTION INTRAMUSCULAR; INTRAVENOUS; SUBCUTANEOUS at 05:39

## 2024-01-01 RX ADMIN — LIDOCAINE 4 PATCH: 40 CREAM TOPICAL at 17:42

## 2024-01-01 RX ADMIN — POVIDONE, POLYVINYL ALCOHOL 1 DROP(S): 20; 27 SOLUTION OPHTHALMIC at 21:25

## 2024-01-01 RX ADMIN — DICLOFENAC SODIUM 2 GRAM(S): 20 SOLUTION TOPICAL at 05:52

## 2024-01-01 RX ADMIN — Medication 1 TABLET(S): at 13:33

## 2024-01-01 RX ADMIN — CHLORHEXIDINE GLUCONATE 1 APPLICATION(S): 1.2 RINSE ORAL at 22:03

## 2024-01-01 RX ADMIN — SODIUM CHLORIDE 1 GRAM(S): 9 INJECTION, SOLUTION INTRAMUSCULAR; INTRAVENOUS; SUBCUTANEOUS at 13:24

## 2024-01-01 RX ADMIN — NYSTATIN 1 APPLICATION(S): 100000 POWDER TOPICAL at 21:30

## 2024-01-01 RX ADMIN — HYDRALAZINE HYDROCHLORIDE 10 MILLIGRAM(S): 10 TABLET ORAL at 05:16

## 2024-01-01 RX ADMIN — DICLOFENAC SODIUM 2 GRAM(S): 20 SOLUTION TOPICAL at 06:46

## 2024-01-01 RX ADMIN — PANTOPRAZOLE SODIUM 40 MILLIGRAM(S): 40 TABLET, DELAYED RELEASE ORAL at 06:40

## 2024-01-01 RX ADMIN — SODIUM CHLORIDE 1 GRAM(S): 9 INJECTION, SOLUTION INTRAMUSCULAR; INTRAVENOUS; SUBCUTANEOUS at 16:14

## 2024-01-01 RX ADMIN — HYDROMORPHONE HYDROCHLORIDE 0.2 MILLIGRAM(S): 2 TABLET ORAL at 21:00

## 2024-01-01 RX ADMIN — Medication 125 MICROGRAM(S): at 05:47

## 2024-01-01 RX ADMIN — POVIDONE, POLYVINYL ALCOHOL 1 DROP(S): 20; 27 SOLUTION OPHTHALMIC at 06:34

## 2024-01-01 RX ADMIN — PREDNISOLONE ACETATE 1 DROP(S): 1.2 SUSPENSION/ DROPS OPHTHALMIC at 23:31

## 2024-01-01 RX ADMIN — Medication 125 MICROGRAM(S): at 06:41

## 2024-01-01 RX ADMIN — DICLOFENAC SODIUM 2 GRAM(S): 20 SOLUTION TOPICAL at 11:53

## 2024-01-01 RX ADMIN — Medication 500 MILLIGRAM(S): at 12:17

## 2024-01-01 RX ADMIN — Medication 1 SPRAY(S): at 18:04

## 2024-01-01 RX ADMIN — SODIUM CHLORIDE 4 MILLILITER(S): 9 INJECTION, SOLUTION INTRAMUSCULAR; INTRAVENOUS; SUBCUTANEOUS at 05:53

## 2024-01-01 RX ADMIN — CEFEPIME 100 MILLIGRAM(S): 2 INJECTION, POWDER, FOR SOLUTION INTRAVENOUS at 17:16

## 2024-01-01 RX ADMIN — INSULIN GLARGINE 20 UNIT(S): 100 INJECTION, SOLUTION SUBCUTANEOUS at 22:30

## 2024-01-01 RX ADMIN — Medication 4 UNIT(S): at 18:08

## 2024-01-01 RX ADMIN — POVIDONE, POLYVINYL ALCOHOL 1 DROP(S): 20; 27 SOLUTION OPHTHALMIC at 06:41

## 2024-01-01 RX ADMIN — AMLODIPINE BESYLATE 10 MILLIGRAM(S): 10 TABLET ORAL at 05:12

## 2024-01-01 RX ADMIN — POVIDONE, POLYVINYL ALCOHOL 1 DROP(S): 20; 27 SOLUTION OPHTHALMIC at 06:55

## 2024-01-01 RX ADMIN — ACETAMINOPHEN 500MG 500 MILLIGRAM(S): 500 TABLET, COATED ORAL at 17:17

## 2024-01-01 RX ADMIN — ACETAMINOPHEN 500MG 1000 MILLIGRAM(S): 500 TABLET, COATED ORAL at 22:50

## 2024-01-01 RX ADMIN — Medication 1 APPLICATION(S): at 21:50

## 2024-01-01 RX ADMIN — IPRATROPIUM BROMIDE AND ALBUTEROL SULFATE 3 MILLILITER(S): 2.5; .5 SOLUTION RESPIRATORY (INHALATION) at 05:40

## 2024-01-01 RX ADMIN — Medication 2: at 23:25

## 2024-01-01 RX ADMIN — DICLOFENAC SODIUM 2 GRAM(S): 20 SOLUTION TOPICAL at 06:17

## 2024-01-01 RX ADMIN — CHLORHEXIDINE GLUCONATE 15 MILLILITER(S): 1.2 RINSE ORAL at 06:41

## 2024-01-01 RX ADMIN — Medication 4: at 18:00

## 2024-01-01 RX ADMIN — Medication 14 UNIT(S): at 14:50

## 2024-01-01 RX ADMIN — DICLOFENAC SODIUM 2 GRAM(S): 20 SOLUTION TOPICAL at 00:32

## 2024-01-01 RX ADMIN — Medication 1 APPLICATION(S): at 22:02

## 2024-01-01 RX ADMIN — INSULIN HUMAN 15 UNIT(S): 100 INJECTION, SOLUTION SUBCUTANEOUS at 12:06

## 2024-01-01 RX ADMIN — POVIDONE, POLYVINYL ALCOHOL 1 DROP(S): 20; 27 SOLUTION OPHTHALMIC at 23:35

## 2024-01-01 RX ADMIN — ACETAMINOPHEN, DIPHENHYDRAMINE HCL, PHENYLEPHRINE HCL 12 MILLIGRAM(S): 325; 25; 5 TABLET ORAL at 20:21

## 2024-01-01 RX ADMIN — WITCH HAZEL 1 APPLICATION(S): 50 SOLUTION RECTAL at 17:57

## 2024-01-01 RX ADMIN — DICLOFENAC SODIUM 2 GRAM(S): 20 SOLUTION TOPICAL at 19:02

## 2024-01-01 RX ADMIN — NYSTATIN 1 APPLICATION(S): 100000 POWDER TOPICAL at 13:16

## 2024-01-01 RX ADMIN — IPRATROPIUM BROMIDE AND ALBUTEROL SULFATE 3 MILLILITER(S): 2.5; .5 SOLUTION RESPIRATORY (INHALATION) at 23:21

## 2024-01-01 RX ADMIN — PREDNISOLONE ACETATE 1 DROP(S): 1.2 SUSPENSION/ DROPS OPHTHALMIC at 17:55

## 2024-01-01 RX ADMIN — FENTANYL 1 PATCH: 12 PATCH, EXTENDED RELEASE TRANSDERMAL at 11:13

## 2024-01-01 RX ADMIN — Medication 125 MILLIGRAM(S): at 22:24

## 2024-01-01 RX ADMIN — LIDOCAINE 4 PATCH: 40 CREAM TOPICAL at 04:30

## 2024-01-01 RX ADMIN — DICLOFENAC SODIUM 2 GRAM(S): 20 SOLUTION TOPICAL at 11:49

## 2024-01-01 RX ADMIN — Medication 500 MILLIGRAM(S): at 12:38

## 2024-01-01 RX ADMIN — LIDOCAINE 1 PATCH: 40 CREAM TOPICAL at 20:00

## 2024-01-01 RX ADMIN — Medication 50 MILLILITER(S): at 06:20

## 2024-01-01 RX ADMIN — PREDNISONE 5 MILLIGRAM(S): 20 TABLET ORAL at 09:58

## 2024-01-01 RX ADMIN — OXYCODONE HYDROCHLORIDE 5 MILLIGRAM(S): 30 TABLET ORAL at 12:23

## 2024-01-01 RX ADMIN — INSULIN GLARGINE 10 UNIT(S): 100 INJECTION, SOLUTION SUBCUTANEOUS at 05:27

## 2024-01-01 RX ADMIN — OXYCODONE HYDROCHLORIDE 2.5 MILLIGRAM(S): 30 TABLET ORAL at 12:30

## 2024-01-01 RX ADMIN — Medication 8: at 16:03

## 2024-01-01 RX ADMIN — AMLODIPINE BESYLATE 10 MILLIGRAM(S): 10 TABLET ORAL at 09:31

## 2024-01-01 RX ADMIN — SODIUM CHLORIDE 1 GRAM(S): 9 INJECTION, SOLUTION INTRAMUSCULAR; INTRAVENOUS; SUBCUTANEOUS at 17:17

## 2024-01-01 RX ADMIN — Medication 2: at 13:26

## 2024-01-01 RX ADMIN — Medication 24 UNIT(S): at 06:08

## 2024-01-01 RX ADMIN — ACETAMINOPHEN 500MG 1000 MILLIGRAM(S): 500 TABLET, COATED ORAL at 13:33

## 2024-01-01 RX ADMIN — CEFEPIME 100 MILLIGRAM(S): 2 INJECTION, POWDER, FOR SOLUTION INTRAVENOUS at 14:17

## 2024-01-01 RX ADMIN — Medication 125 MILLIGRAM(S): at 05:54

## 2024-01-01 RX ADMIN — Medication 15 MILLILITER(S): at 12:08

## 2024-01-01 RX ADMIN — Medication 300 MILLIGRAM(S): at 11:30

## 2024-01-01 RX ADMIN — LIDOCAINE 1 PATCH: 40 CREAM TOPICAL at 18:42

## 2024-01-01 RX ADMIN — ESCITALOPRAM OXALATE 10 MILLIGRAM(S): 10 TABLET, FILM COATED ORAL at 18:35

## 2024-01-01 RX ADMIN — POTASSIUM CHLORIDE 40 MILLIEQUIVALENT(S): 600 TABLET, EXTENDED RELEASE ORAL at 10:58

## 2024-01-01 RX ADMIN — PANTOPRAZOLE SODIUM 40 MILLIGRAM(S): 40 TABLET, DELAYED RELEASE ORAL at 05:20

## 2024-01-01 RX ADMIN — LIDOCAINE 1 PATCH: 40 CREAM TOPICAL at 06:38

## 2024-01-01 RX ADMIN — Medication 1: at 00:16

## 2024-01-01 RX ADMIN — Medication 1 TABLET(S): at 11:50

## 2024-01-01 RX ADMIN — TRIAMCINOLONE ACETONIDE 1 APPLICATION(S): 0.15 AEROSOL, SPRAY TOPICAL at 14:19

## 2024-01-01 RX ADMIN — LIDOCAINE 4 PATCH: 40 CREAM TOPICAL at 16:36

## 2024-01-01 RX ADMIN — ACETAMINOPHEN 500MG 650 MILLIGRAM(S): 500 TABLET, COATED ORAL at 16:36

## 2024-01-01 RX ADMIN — DICLOFENAC SODIUM 2 GRAM(S): 20 SOLUTION TOPICAL at 12:01

## 2024-01-01 RX ADMIN — Medication 2: at 02:41

## 2024-01-01 RX ADMIN — CHLORHEXIDINE GLUCONATE 15 MILLILITER(S): 1.2 RINSE ORAL at 05:55

## 2024-01-01 RX ADMIN — DICLOFENAC SODIUM 2 GRAM(S): 20 SOLUTION TOPICAL at 18:17

## 2024-01-01 RX ADMIN — SODIUM CHLORIDE 1 GRAM(S): 9 INJECTION, SOLUTION INTRAMUSCULAR; INTRAVENOUS; SUBCUTANEOUS at 05:12

## 2024-01-01 RX ADMIN — IPRATROPIUM BROMIDE AND ALBUTEROL SULFATE 3 MILLILITER(S): 2.5; .5 SOLUTION RESPIRATORY (INHALATION) at 05:41

## 2024-01-01 RX ADMIN — WITCH HAZEL 1 APPLICATION(S): 50 SOLUTION RECTAL at 05:15

## 2024-01-01 RX ADMIN — HYDRALAZINE HYDROCHLORIDE 10 MILLIGRAM(S): 10 TABLET ORAL at 06:04

## 2024-01-01 RX ADMIN — FENTANYL 1 PATCH: 12 PATCH, EXTENDED RELEASE TRANSDERMAL at 18:30

## 2024-01-01 RX ADMIN — PANTOPRAZOLE SODIUM 40 MILLIGRAM(S): 40 TABLET, DELAYED RELEASE ORAL at 21:27

## 2024-01-01 RX ADMIN — DICLOFENAC SODIUM 2 GRAM(S): 20 SOLUTION TOPICAL at 23:36

## 2024-01-01 RX ADMIN — CHLORHEXIDINE GLUCONATE 1 APPLICATION(S): 1.2 RINSE ORAL at 21:26

## 2024-01-01 RX ADMIN — DICLOFENAC SODIUM 2 GRAM(S): 20 SOLUTION TOPICAL at 23:50

## 2024-01-01 RX ADMIN — DICLOFENAC SODIUM 2 GRAM(S): 20 SOLUTION TOPICAL at 05:02

## 2024-01-01 RX ADMIN — ACETAMINOPHEN 500MG 400 MILLIGRAM(S): 500 TABLET, COATED ORAL at 04:52

## 2024-01-01 RX ADMIN — OXYCODONE HYDROCHLORIDE 2.5 MILLIGRAM(S): 30 TABLET ORAL at 21:30

## 2024-01-01 RX ADMIN — PANTOPRAZOLE SODIUM 40 MILLIGRAM(S): 40 TABLET, DELAYED RELEASE ORAL at 17:03

## 2024-01-01 RX ADMIN — Medication 1: at 23:52

## 2024-01-01 RX ADMIN — LIDOCAINE 4 PATCH: 40 CREAM TOPICAL at 19:30

## 2024-01-01 RX ADMIN — ACETAMINOPHEN 500MG 500 MILLIGRAM(S): 500 TABLET, COATED ORAL at 06:30

## 2024-01-01 RX ADMIN — Medication 125 MILLIGRAM(S): at 17:08

## 2024-01-01 RX ADMIN — DICLOFENAC SODIUM 2 GRAM(S): 20 SOLUTION TOPICAL at 17:21

## 2024-01-01 RX ADMIN — Medication 125 MILLIGRAM(S): at 12:40

## 2024-01-01 RX ADMIN — ESCITALOPRAM OXALATE 10 MILLIGRAM(S): 10 TABLET, FILM COATED ORAL at 19:03

## 2024-01-01 RX ADMIN — Medication 4: at 12:22

## 2024-01-01 RX ADMIN — DICLOFENAC SODIUM 2 GRAM(S): 20 SOLUTION TOPICAL at 05:24

## 2024-01-01 RX ADMIN — SODIUM CHLORIDE 1 GRAM(S): 9 INJECTION, SOLUTION INTRAMUSCULAR; INTRAVENOUS; SUBCUTANEOUS at 04:42

## 2024-01-01 RX ADMIN — HYDROMORPHONE HYDROCHLORIDE 0.5 MILLIGRAM(S): 2 TABLET ORAL at 10:04

## 2024-01-01 RX ADMIN — CHLORHEXIDINE GLUCONATE 15 MILLILITER(S): 1.2 RINSE ORAL at 05:49

## 2024-01-01 RX ADMIN — IPRATROPIUM BROMIDE AND ALBUTEROL SULFATE 3 MILLILITER(S): 2.5; .5 SOLUTION RESPIRATORY (INHALATION) at 05:10

## 2024-01-01 RX ADMIN — Medication 4: at 20:39

## 2024-01-01 RX ADMIN — ACETAMINOPHEN 500MG 650 MILLIGRAM(S): 500 TABLET, COATED ORAL at 13:45

## 2024-01-01 RX ADMIN — DICLOFENAC SODIUM 2 GRAM(S): 20 SOLUTION TOPICAL at 05:34

## 2024-01-01 RX ADMIN — Medication 125 MILLIGRAM(S): at 05:34

## 2024-01-01 RX ADMIN — Medication 50 MILLILITER(S): at 15:25

## 2024-01-01 RX ADMIN — Medication 25 MILLIGRAM(S): at 13:24

## 2024-01-01 RX ADMIN — CHLORHEXIDINE GLUCONATE 1 APPLICATION(S): 1.2 RINSE ORAL at 22:26

## 2024-01-01 RX ADMIN — DICLOFENAC SODIUM 2 GRAM(S): 20 SOLUTION TOPICAL at 23:31

## 2024-01-01 RX ADMIN — Medication 1 SPRAY(S): at 11:28

## 2024-01-01 RX ADMIN — Medication 1 TABLET(S): at 12:47

## 2024-01-01 RX ADMIN — HYDROMORPHONE HYDROCHLORIDE 1 MILLIGRAM(S): 2 TABLET ORAL at 07:00

## 2024-01-01 RX ADMIN — PANTOPRAZOLE SODIUM 40 MILLIGRAM(S): 40 TABLET, DELAYED RELEASE ORAL at 18:05

## 2024-01-01 RX ADMIN — POVIDONE, POLYVINYL ALCOHOL 1 DROP(S): 20; 27 SOLUTION OPHTHALMIC at 12:13

## 2024-01-01 RX ADMIN — IPRATROPIUM BROMIDE AND ALBUTEROL SULFATE 3 MILLILITER(S): 2.5; .5 SOLUTION RESPIRATORY (INHALATION) at 11:14

## 2024-01-01 RX ADMIN — CHLORHEXIDINE GLUCONATE 1 APPLICATION(S): 1.2 RINSE ORAL at 12:08

## 2024-01-01 RX ADMIN — SODIUM CHLORIDE 1000 MILLILITER(S): 9 INJECTION, SOLUTION INTRAMUSCULAR; INTRAVENOUS; SUBCUTANEOUS at 11:52

## 2024-01-01 RX ADMIN — Medication 5 MILLILITER(S): at 00:17

## 2024-01-01 RX ADMIN — TRIAMCINOLONE ACETONIDE 1 APPLICATION(S): 0.15 AEROSOL, SPRAY TOPICAL at 13:55

## 2024-01-01 RX ADMIN — IPRATROPIUM BROMIDE AND ALBUTEROL SULFATE 3 MILLILITER(S): 2.5; .5 SOLUTION RESPIRATORY (INHALATION) at 11:40

## 2024-01-01 RX ADMIN — CHLORHEXIDINE GLUCONATE 15 MILLILITER(S): 1.2 RINSE ORAL at 17:49

## 2024-01-01 RX ADMIN — IPRATROPIUM BROMIDE AND ALBUTEROL SULFATE 3 MILLILITER(S): 2.5; .5 SOLUTION RESPIRATORY (INHALATION) at 11:09

## 2024-01-01 RX ADMIN — CHLORHEXIDINE GLUCONATE 1 APPLICATION(S): 1.2 RINSE ORAL at 22:11

## 2024-01-01 RX ADMIN — CHLORHEXIDINE GLUCONATE 1 APPLICATION(S): 1.2 RINSE ORAL at 21:12

## 2024-01-01 RX ADMIN — Medication 1 APPLICATION(S): at 23:14

## 2024-01-01 RX ADMIN — NYSTATIN 1 APPLICATION(S): 100000 POWDER TOPICAL at 21:58

## 2024-01-01 RX ADMIN — SODIUM CHLORIDE 1 GRAM(S): 9 INJECTION, SOLUTION INTRAMUSCULAR; INTRAVENOUS; SUBCUTANEOUS at 23:08

## 2024-01-01 RX ADMIN — SODIUM CHLORIDE 1 GRAM(S): 9 INJECTION, SOLUTION INTRAMUSCULAR; INTRAVENOUS; SUBCUTANEOUS at 23:33

## 2024-01-01 RX ADMIN — Medication 1: at 12:14

## 2024-01-01 RX ADMIN — NYSTATIN 1 APPLICATION(S): 100000 POWDER TOPICAL at 22:42

## 2024-01-01 RX ADMIN — Medication 30 UNIT(S): at 12:18

## 2024-01-01 RX ADMIN — Medication 5 MILLILITER(S): at 11:50

## 2024-01-01 RX ADMIN — Medication 1 SPRAY(S): at 12:27

## 2024-01-01 RX ADMIN — WITCH HAZEL 1 APPLICATION(S): 50 SOLUTION RECTAL at 05:48

## 2024-01-01 RX ADMIN — WITCH HAZEL 1 APPLICATION(S): 50 SOLUTION RECTAL at 05:21

## 2024-01-01 RX ADMIN — Medication 500 MILLIGRAM(S): at 11:26

## 2024-01-01 RX ADMIN — Medication 5 MILLILITER(S): at 17:52

## 2024-01-01 RX ADMIN — SODIUM CHLORIDE 1 GRAM(S): 9 INJECTION, SOLUTION INTRAMUSCULAR; INTRAVENOUS; SUBCUTANEOUS at 22:24

## 2024-01-01 RX ADMIN — Medication 14 UNIT(S): at 12:28

## 2024-01-01 RX ADMIN — Medication 3: at 01:55

## 2024-01-01 RX ADMIN — Medication 12.5 MILLIGRAM(S): at 21:51

## 2024-01-01 RX ADMIN — IPRATROPIUM BROMIDE AND ALBUTEROL SULFATE 3 MILLILITER(S): 2.5; .5 SOLUTION RESPIRATORY (INHALATION) at 17:03

## 2024-01-01 RX ADMIN — ACETAMINOPHEN, DIPHENHYDRAMINE HCL, PHENYLEPHRINE HCL 12 MILLIGRAM(S): 325; 25; 5 TABLET ORAL at 22:42

## 2024-01-01 RX ADMIN — Medication 25 MILLIGRAM(S): at 05:46

## 2024-01-01 RX ADMIN — TRIAMCINOLONE ACETONIDE 1 APPLICATION(S): 0.15 AEROSOL, SPRAY TOPICAL at 23:26

## 2024-01-01 RX ADMIN — Medication 40 UNIT(S): at 12:34

## 2024-01-01 RX ADMIN — POVIDONE, POLYVINYL ALCOHOL 1 DROP(S): 20; 27 SOLUTION OPHTHALMIC at 17:41

## 2024-01-01 RX ADMIN — POVIDONE, POLYVINYL ALCOHOL 1 DROP(S): 20; 27 SOLUTION OPHTHALMIC at 13:09

## 2024-01-01 RX ADMIN — Medication 1 SPRAY(S): at 21:25

## 2024-01-01 RX ADMIN — PANTOPRAZOLE SODIUM 40 MILLIGRAM(S): 40 TABLET, DELAYED RELEASE ORAL at 07:09

## 2024-01-01 RX ADMIN — CEFTOLOZANE AND TAZOBACTAM 33.33 MILLIGRAM(S): 1; .5 INJECTION, POWDER, LYOPHILIZED, FOR SOLUTION INTRAVENOUS at 23:27

## 2024-01-01 RX ADMIN — LIDOCAINE 4 PATCH: 40 CREAM TOPICAL at 19:26

## 2024-01-01 RX ADMIN — POVIDONE, POLYVINYL ALCOHOL 1 DROP(S): 20; 27 SOLUTION OPHTHALMIC at 11:28

## 2024-01-01 RX ADMIN — CEFEPIME 100 MILLIGRAM(S): 2 INJECTION, POWDER, FOR SOLUTION INTRAVENOUS at 13:13

## 2024-01-01 RX ADMIN — NYSTATIN 1 APPLICATION(S): 100000 POWDER TOPICAL at 07:09

## 2024-01-01 RX ADMIN — LIDOCAINE 4 PATCH: 40 CREAM TOPICAL at 18:14

## 2024-01-01 RX ADMIN — Medication 160 MILLIGRAM(S): at 12:10

## 2024-01-01 RX ADMIN — Medication 6: at 00:58

## 2024-01-01 RX ADMIN — IPRATROPIUM BROMIDE AND ALBUTEROL SULFATE 3 MILLILITER(S): 2.5; .5 SOLUTION RESPIRATORY (INHALATION) at 23:22

## 2024-01-01 RX ADMIN — Medication 500 MILLIGRAM(S): at 12:02

## 2024-01-01 RX ADMIN — Medication 50 MILLILITER(S): at 00:03

## 2024-01-01 RX ADMIN — Medication 1 SPRAY(S): at 05:21

## 2024-01-01 RX ADMIN — ACETAMINOPHEN 500MG 500 MILLIGRAM(S): 500 TABLET, COATED ORAL at 12:07

## 2024-01-01 RX ADMIN — WITCH HAZEL 1 APPLICATION(S): 50 SOLUTION RECTAL at 17:10

## 2024-01-01 RX ADMIN — Medication 1 SPRAY(S): at 11:30

## 2024-01-01 RX ADMIN — FENTANYL 1 PATCH: 12 PATCH, EXTENDED RELEASE TRANSDERMAL at 10:00

## 2024-01-01 RX ADMIN — ACETAMINOPHEN 500MG 1000 MILLIGRAM(S): 500 TABLET, COATED ORAL at 21:51

## 2024-01-01 RX ADMIN — DICLOFENAC SODIUM 2 GRAM(S): 20 SOLUTION TOPICAL at 12:13

## 2024-01-01 RX ADMIN — DICLOFENAC SODIUM 2 GRAM(S): 20 SOLUTION TOPICAL at 12:10

## 2024-01-01 RX ADMIN — POTASSIUM CHLORIDE 100 MILLIEQUIVALENT(S): 600 TABLET, EXTENDED RELEASE ORAL at 13:17

## 2024-01-01 RX ADMIN — IPRATROPIUM BROMIDE AND ALBUTEROL SULFATE 3 MILLILITER(S): 2.5; .5 SOLUTION RESPIRATORY (INHALATION) at 17:18

## 2024-01-01 RX ADMIN — LIDOCAINE 4 PATCH: 40 CREAM TOPICAL at 05:05

## 2024-01-01 RX ADMIN — Medication 160 MILLIGRAM(S): at 11:43

## 2024-01-01 RX ADMIN — Medication 14 UNIT(S): at 17:46

## 2024-01-01 RX ADMIN — Medication 125 MILLIGRAM(S): at 12:22

## 2024-01-01 RX ADMIN — Medication 5 MILLILITER(S): at 05:47

## 2024-01-01 RX ADMIN — POVIDONE, POLYVINYL ALCOHOL 1 DROP(S): 20; 27 SOLUTION OPHTHALMIC at 11:19

## 2024-01-01 RX ADMIN — Medication 5 MILLILITER(S): at 05:23

## 2024-01-01 RX ADMIN — Medication 1 SPRAY(S): at 18:05

## 2024-01-01 RX ADMIN — Medication 25 GRAM(S): at 10:20

## 2024-01-01 RX ADMIN — MIDODRINE HYDROCHLORIDE 10 MILLIGRAM(S): 5 TABLET ORAL at 22:16

## 2024-01-01 RX ADMIN — Medication 1 SPRAY(S): at 05:56

## 2024-01-01 RX ADMIN — DICLOFENAC SODIUM 2 GRAM(S): 20 SOLUTION TOPICAL at 12:16

## 2024-01-01 RX ADMIN — Medication 125 MILLIGRAM(S): at 22:25

## 2024-01-01 RX ADMIN — ACETAMINOPHEN 500MG 400 MILLIGRAM(S): 500 TABLET, COATED ORAL at 12:43

## 2024-01-01 RX ADMIN — DIPHENHYDRAMINE HYDROCHLORIDE AND LIDOCAINE HYDROCHLORIDE AND ALUMINUM HYDROXIDE AND MAGNESIUM HYDRO 5 MILLILITER(S): KIT at 05:36

## 2024-01-01 RX ADMIN — POVIDONE, POLYVINYL ALCOHOL 1 DROP(S): 20; 27 SOLUTION OPHTHALMIC at 05:02

## 2024-01-01 RX ADMIN — DIPHENHYDRAMINE HYDROCHLORIDE AND LIDOCAINE HYDROCHLORIDE AND ALUMINUM HYDROXIDE AND MAGNESIUM HYDRO 5 MILLILITER(S): KIT at 05:52

## 2024-01-01 RX ADMIN — POVIDONE, POLYVINYL ALCOHOL 1 DROP(S): 20; 27 SOLUTION OPHTHALMIC at 17:34

## 2024-01-01 RX ADMIN — DICLOFENAC SODIUM 2 GRAM(S): 20 SOLUTION TOPICAL at 12:06

## 2024-01-01 RX ADMIN — IPRATROPIUM BROMIDE AND ALBUTEROL SULFATE 3 MILLILITER(S): 2.5; .5 SOLUTION RESPIRATORY (INHALATION) at 23:46

## 2024-01-01 RX ADMIN — Medication 1 SPRAY(S): at 18:10

## 2024-01-01 RX ADMIN — ESCITALOPRAM OXALATE 10 MILLIGRAM(S): 10 TABLET, FILM COATED ORAL at 18:03

## 2024-01-01 RX ADMIN — SODIUM CHLORIDE 1 GRAM(S): 9 INJECTION, SOLUTION INTRAMUSCULAR; INTRAVENOUS; SUBCUTANEOUS at 05:51

## 2024-01-01 RX ADMIN — CHLORHEXIDINE GLUCONATE 15 MILLILITER(S): 1.2 RINSE ORAL at 18:40

## 2024-01-01 RX ADMIN — HYDRALAZINE HYDROCHLORIDE 10 MILLIGRAM(S): 10 TABLET ORAL at 23:25

## 2024-01-01 RX ADMIN — Medication 1 TABLET(S): at 13:45

## 2024-01-01 RX ADMIN — Medication 500 MILLIGRAM(S): at 12:37

## 2024-01-01 RX ADMIN — SODIUM CHLORIDE 1 GRAM(S): 9 INJECTION, SOLUTION INTRAMUSCULAR; INTRAVENOUS; SUBCUTANEOUS at 06:42

## 2024-01-01 RX ADMIN — Medication 1 SPRAY(S): at 11:35

## 2024-01-01 RX ADMIN — WITCH HAZEL 1 APPLICATION(S): 50 SOLUTION RECTAL at 18:14

## 2024-01-01 RX ADMIN — IPRATROPIUM BROMIDE AND ALBUTEROL SULFATE 3 MILLILITER(S): 2.5; .5 SOLUTION RESPIRATORY (INHALATION) at 23:32

## 2024-01-01 RX ADMIN — SODIUM CHLORIDE 1 GRAM(S): 9 INJECTION, SOLUTION INTRAMUSCULAR; INTRAVENOUS; SUBCUTANEOUS at 17:24

## 2024-01-01 RX ADMIN — Medication 1 SPRAY(S): at 17:13

## 2024-01-01 RX ADMIN — WITCH HAZEL 1 APPLICATION(S): 50 SOLUTION RECTAL at 21:52

## 2024-01-01 RX ADMIN — IPRATROPIUM BROMIDE AND ALBUTEROL SULFATE 3 MILLILITER(S): 2.5; .5 SOLUTION RESPIRATORY (INHALATION) at 05:28

## 2024-01-01 RX ADMIN — DICLOFENAC SODIUM 2 GRAM(S): 20 SOLUTION TOPICAL at 06:38

## 2024-01-01 RX ADMIN — GABAPENTIN 250 MILLIGRAM(S): 300 CAPSULE ORAL at 21:54

## 2024-01-01 RX ADMIN — SODIUM CHLORIDE 1 GRAM(S): 9 INJECTION, SOLUTION INTRAMUSCULAR; INTRAVENOUS; SUBCUTANEOUS at 05:41

## 2024-01-01 RX ADMIN — Medication 125 MILLIGRAM(S): at 11:36

## 2024-01-01 RX ADMIN — Medication 5 MILLILITER(S): at 06:38

## 2024-01-01 RX ADMIN — GABAPENTIN 250 MILLIGRAM(S): 300 CAPSULE ORAL at 21:24

## 2024-01-01 RX ADMIN — CHLORHEXIDINE GLUCONATE 15 MILLILITER(S): 1.2 RINSE ORAL at 06:33

## 2024-01-01 RX ADMIN — DICLOFENAC SODIUM 2 GRAM(S): 20 SOLUTION TOPICAL at 06:50

## 2024-01-01 RX ADMIN — ACETAMINOPHEN 500MG 650 MILLIGRAM(S): 500 TABLET, COATED ORAL at 13:47

## 2024-01-01 RX ADMIN — Medication 1: at 06:47

## 2024-01-01 RX ADMIN — ACETAMINOPHEN, DIPHENHYDRAMINE HCL, PHENYLEPHRINE HCL 12 MILLIGRAM(S): 325; 25; 5 TABLET ORAL at 22:01

## 2024-01-01 RX ADMIN — Medication 18 UNIT(S): at 06:39

## 2024-01-01 RX ADMIN — Medication 2: at 02:19

## 2024-01-01 RX ADMIN — SODIUM CHLORIDE 1 GRAM(S): 9 INJECTION, SOLUTION INTRAMUSCULAR; INTRAVENOUS; SUBCUTANEOUS at 05:25

## 2024-01-01 RX ADMIN — ACETAMINOPHEN 500MG 650 MILLIGRAM(S): 500 TABLET, COATED ORAL at 12:33

## 2024-01-01 RX ADMIN — Medication 1: at 06:11

## 2024-01-01 RX ADMIN — IPRATROPIUM BROMIDE AND ALBUTEROL SULFATE 3 MILLILITER(S): 2.5; .5 SOLUTION RESPIRATORY (INHALATION) at 11:51

## 2024-01-01 RX ADMIN — SODIUM CHLORIDE 500 MILLILITER(S): 9 INJECTION, SOLUTION INTRAMUSCULAR; INTRAVENOUS; SUBCUTANEOUS at 12:29

## 2024-01-01 RX ADMIN — Medication 1: at 12:12

## 2024-01-01 RX ADMIN — ACETAMINOPHEN 500MG 1000 MILLIGRAM(S): 500 TABLET, COATED ORAL at 06:06

## 2024-01-01 RX ADMIN — Medication 5 MILLILITER(S): at 05:26

## 2024-01-01 RX ADMIN — PANTOPRAZOLE SODIUM 40 MILLIGRAM(S): 40 TABLET, DELAYED RELEASE ORAL at 06:42

## 2024-01-01 RX ADMIN — PREDNISOLONE ACETATE 1 DROP(S): 1.2 SUSPENSION/ DROPS OPHTHALMIC at 00:14

## 2024-01-01 RX ADMIN — Medication 25 MILLILITER(S): at 16:01

## 2024-01-01 RX ADMIN — Medication 1: at 18:06

## 2024-01-01 RX ADMIN — ACETAMINOPHEN 500MG 1000 MILLIGRAM(S): 500 TABLET, COATED ORAL at 13:47

## 2024-01-01 RX ADMIN — Medication 1 SPRAY(S): at 05:24

## 2024-01-01 RX ADMIN — FENTANYL 1 PATCH: 12 PATCH, EXTENDED RELEASE TRANSDERMAL at 19:50

## 2024-01-01 RX ADMIN — NYSTATIN 1 APPLICATION(S): 100000 POWDER TOPICAL at 22:01

## 2024-01-01 RX ADMIN — FENTANYL 1 PATCH: 12 PATCH, EXTENDED RELEASE TRANSDERMAL at 09:19

## 2024-01-01 RX ADMIN — HYDROMORPHONE HYDROCHLORIDE 0.5 MILLIGRAM(S): 2 TABLET ORAL at 11:26

## 2024-01-01 RX ADMIN — DICLOFENAC SODIUM 2 GRAM(S): 20 SOLUTION TOPICAL at 13:17

## 2024-01-01 RX ADMIN — INSULIN HUMAN 32 UNIT(S): 100 INJECTION, SOLUTION SUBCUTANEOUS at 05:27

## 2024-01-01 RX ADMIN — Medication 2 DROP(S): at 17:31

## 2024-01-01 RX ADMIN — Medication 5 MILLILITER(S): at 06:09

## 2024-01-01 RX ADMIN — INSULIN GLARGINE 5 UNIT(S): 100 INJECTION, SOLUTION SUBCUTANEOUS at 06:07

## 2024-01-01 RX ADMIN — GABAPENTIN 250 MILLIGRAM(S): 300 CAPSULE ORAL at 21:15

## 2024-01-01 RX ADMIN — Medication 125 MICROGRAM(S): at 04:14

## 2024-01-01 RX ADMIN — Medication 1 SPRAY(S): at 06:09

## 2024-01-01 RX ADMIN — CHLORHEXIDINE GLUCONATE 15 MILLILITER(S): 1.2 RINSE ORAL at 21:23

## 2024-01-01 RX ADMIN — Medication 5 MILLILITER(S): at 12:33

## 2024-01-01 RX ADMIN — IPRATROPIUM BROMIDE AND ALBUTEROL SULFATE 3 MILLILITER(S): 2.5; .5 SOLUTION RESPIRATORY (INHALATION) at 05:52

## 2024-01-01 RX ADMIN — PREDNISOLONE ACETATE 1 DROP(S): 1.2 SUSPENSION/ DROPS OPHTHALMIC at 00:51

## 2024-01-01 RX ADMIN — NYSTATIN 1 APPLICATION(S): 100000 POWDER TOPICAL at 06:47

## 2024-01-01 RX ADMIN — IPRATROPIUM BROMIDE AND ALBUTEROL SULFATE 3 MILLILITER(S): 2.5; .5 SOLUTION RESPIRATORY (INHALATION) at 17:24

## 2024-01-01 RX ADMIN — Medication 1: at 12:40

## 2024-01-01 RX ADMIN — MEROPENEM 100 MILLIGRAM(S): 500 INJECTION, POWDER, FOR SOLUTION INTRAVENOUS at 05:42

## 2024-01-01 RX ADMIN — FENTANYL 1 PATCH: 12 PATCH, EXTENDED RELEASE TRANSDERMAL at 07:53

## 2024-01-01 RX ADMIN — DICLOFENAC SODIUM 2 GRAM(S): 20 SOLUTION TOPICAL at 05:06

## 2024-01-01 RX ADMIN — NYSTATIN 1 APPLICATION(S): 100000 POWDER TOPICAL at 23:10

## 2024-01-01 RX ADMIN — DICLOFENAC SODIUM 2 GRAM(S): 20 SOLUTION TOPICAL at 06:19

## 2024-01-01 RX ADMIN — HYDROMORPHONE HYDROCHLORIDE 0.5 MILLIGRAM(S): 2 TABLET ORAL at 17:07

## 2024-01-01 RX ADMIN — DICLOFENAC SODIUM 2 GRAM(S): 20 SOLUTION TOPICAL at 17:43

## 2024-01-01 RX ADMIN — SODIUM CHLORIDE 4 MILLILITER(S): 9 INJECTION, SOLUTION INTRAMUSCULAR; INTRAVENOUS; SUBCUTANEOUS at 18:38

## 2024-01-01 RX ADMIN — WITCH HAZEL 1 APPLICATION(S): 50 SOLUTION RECTAL at 05:34

## 2024-01-01 RX ADMIN — METRONIDAZOLE 100 MILLIGRAM(S): 500 TABLET ORAL at 06:50

## 2024-01-01 RX ADMIN — CEFEPIME 100 MILLIGRAM(S): 2 INJECTION, POWDER, FOR SOLUTION INTRAVENOUS at 04:42

## 2024-01-01 RX ADMIN — Medication 1 SPRAY(S): at 17:25

## 2024-01-01 RX ADMIN — Medication 2: at 11:49

## 2024-01-01 RX ADMIN — DICLOFENAC SODIUM 2 GRAM(S): 20 SOLUTION TOPICAL at 17:20

## 2024-01-01 RX ADMIN — SODIUM CHLORIDE 1 GRAM(S): 9 INJECTION, SOLUTION INTRAMUSCULAR; INTRAVENOUS; SUBCUTANEOUS at 18:35

## 2024-01-01 RX ADMIN — PANTOPRAZOLE SODIUM 40 MILLIGRAM(S): 40 TABLET, DELAYED RELEASE ORAL at 17:10

## 2024-01-01 RX ADMIN — LIDOCAINE 4 PATCH: 40 CREAM TOPICAL at 17:06

## 2024-01-01 RX ADMIN — NYSTATIN 1 APPLICATION(S): 100000 POWDER TOPICAL at 13:33

## 2024-01-01 RX ADMIN — Medication 5 MILLILITER(S): at 05:14

## 2024-01-01 RX ADMIN — Medication 125 MICROGRAM(S): at 04:35

## 2024-01-01 RX ADMIN — Medication 50 MILLILITER(S): at 11:53

## 2024-01-01 RX ADMIN — FENTANYL 1 PATCH: 12 PATCH, EXTENDED RELEASE TRANSDERMAL at 18:40

## 2024-01-01 RX ADMIN — Medication 30 UNIT(S): at 12:30

## 2024-01-01 RX ADMIN — LIDOCAINE 4 PATCH: 40 CREAM TOPICAL at 16:15

## 2024-01-01 RX ADMIN — PREDNISOLONE ACETATE 1 DROP(S): 1.2 SUSPENSION/ DROPS OPHTHALMIC at 12:15

## 2024-01-01 RX ADMIN — Medication 2: at 13:04

## 2024-01-01 RX ADMIN — Medication 2: at 05:35

## 2024-01-01 RX ADMIN — NYSTATIN 1 APPLICATION(S): 100000 POWDER TOPICAL at 13:21

## 2024-01-01 RX ADMIN — NYSTATIN 1 APPLICATION(S): 100000 POWDER TOPICAL at 13:58

## 2024-01-01 RX ADMIN — Medication 300 MILLIGRAM(S): at 22:50

## 2024-01-01 RX ADMIN — Medication 2 MILLIGRAM(S): at 18:40

## 2024-01-01 RX ADMIN — Medication 5 MILLILITER(S): at 06:06

## 2024-01-01 RX ADMIN — CHLORHEXIDINE GLUCONATE 1 APPLICATION(S): 1.2 RINSE ORAL at 21:23

## 2024-01-01 RX ADMIN — WITCH HAZEL 1 APPLICATION(S): 50 SOLUTION RECTAL at 18:13

## 2024-01-01 RX ADMIN — LIDOCAINE 1 PATCH: 40 CREAM TOPICAL at 04:49

## 2024-01-01 RX ADMIN — OXYCODONE HYDROCHLORIDE 2.5 MILLIGRAM(S): 30 TABLET ORAL at 13:09

## 2024-01-01 RX ADMIN — CHLORHEXIDINE GLUCONATE 15 MILLILITER(S): 1.2 RINSE ORAL at 05:11

## 2024-01-01 RX ADMIN — Medication 1 SPRAY(S): at 12:20

## 2024-01-01 RX ADMIN — Medication 5 MILLILITER(S): at 12:06

## 2024-01-01 RX ADMIN — Medication 2: at 18:54

## 2024-01-01 RX ADMIN — Medication 26 UNIT(S): at 09:15

## 2024-01-01 RX ADMIN — IPRATROPIUM BROMIDE AND ALBUTEROL SULFATE 3 MILLILITER(S): 2.5; .5 SOLUTION RESPIRATORY (INHALATION) at 12:39

## 2024-01-01 RX ADMIN — PREDNISOLONE ACETATE 1 DROP(S): 1.2 SUSPENSION/ DROPS OPHTHALMIC at 00:01

## 2024-01-01 RX ADMIN — ACETAMINOPHEN 500MG 1000 MILLIGRAM(S): 500 TABLET, COATED ORAL at 13:45

## 2024-01-01 RX ADMIN — Medication 1 SPRAY(S): at 17:38

## 2024-01-01 RX ADMIN — PREDNISONE 5 MILLIGRAM(S): 20 TABLET ORAL at 05:16

## 2024-01-01 RX ADMIN — Medication 500 MILLIGRAM(S): at 12:09

## 2024-01-01 RX ADMIN — Medication 125 MILLIGRAM(S): at 06:41

## 2024-01-01 RX ADMIN — POVIDONE, POLYVINYL ALCOHOL 1 DROP(S): 20; 27 SOLUTION OPHTHALMIC at 06:48

## 2024-01-01 RX ADMIN — ACETAMINOPHEN 500MG 650 MILLIGRAM(S): 500 TABLET, COATED ORAL at 05:02

## 2024-01-01 RX ADMIN — NYSTATIN 1 APPLICATION(S): 100000 POWDER TOPICAL at 23:23

## 2024-01-01 RX ADMIN — IPRATROPIUM BROMIDE AND ALBUTEROL SULFATE 3 MILLILITER(S): 2.5; .5 SOLUTION RESPIRATORY (INHALATION) at 17:04

## 2024-01-01 RX ADMIN — Medication 1 SPRAY(S): at 22:31

## 2024-01-01 RX ADMIN — CHLORHEXIDINE GLUCONATE 15 MILLILITER(S): 1.2 RINSE ORAL at 18:30

## 2024-01-01 RX ADMIN — FENTANYL 1 PATCH: 12 PATCH, EXTENDED RELEASE TRANSDERMAL at 19:26

## 2024-01-01 RX ADMIN — Medication 4 MILLILITER(S): at 18:07

## 2024-01-01 RX ADMIN — Medication 1 SPRAY(S): at 23:24

## 2024-01-01 RX ADMIN — SODIUM CHLORIDE 1 GRAM(S): 9 INJECTION, SOLUTION INTRAMUSCULAR; INTRAVENOUS; SUBCUTANEOUS at 21:14

## 2024-01-01 RX ADMIN — INSULIN GLARGINE 12 UNIT(S): 100 INJECTION, SOLUTION SUBCUTANEOUS at 06:06

## 2024-01-01 RX ADMIN — ACETAMINOPHEN, DIPHENHYDRAMINE HCL, PHENYLEPHRINE HCL 12 MILLIGRAM(S): 325; 25; 5 TABLET ORAL at 21:30

## 2024-01-01 RX ADMIN — DICLOFENAC SODIUM 2 GRAM(S): 20 SOLUTION TOPICAL at 06:37

## 2024-01-01 RX ADMIN — PREDNISOLONE ACETATE 1 DROP(S): 1.2 SUSPENSION/ DROPS OPHTHALMIC at 18:03

## 2024-01-01 RX ADMIN — DICLOFENAC SODIUM 2 GRAM(S): 20 SOLUTION TOPICAL at 13:10

## 2024-01-01 RX ADMIN — Medication 1 SPRAY(S): at 22:52

## 2024-01-01 RX ADMIN — LIDOCAINE 4 PATCH: 40 CREAM TOPICAL at 05:42

## 2024-01-01 RX ADMIN — CHLORHEXIDINE GLUCONATE 15 MILLILITER(S): 213 SOLUTION TOPICAL at 05:18

## 2024-01-01 RX ADMIN — Medication 1 APPLICATION(S): at 12:09

## 2024-01-01 RX ADMIN — Medication 50 MILLILITER(S): at 05:50

## 2024-01-01 RX ADMIN — Medication 300 MILLIGRAM(S): at 12:21

## 2024-01-01 RX ADMIN — Medication 25 GRAM(S): at 10:16

## 2024-01-01 RX ADMIN — Medication 5 MILLILITER(S): at 00:09

## 2024-01-01 RX ADMIN — CEFEPIME 100 MILLIGRAM(S): 2 INJECTION, POWDER, FOR SOLUTION INTRAVENOUS at 23:31

## 2024-01-01 RX ADMIN — MEROPENEM 100 MILLIGRAM(S): 500 INJECTION, POWDER, FOR SOLUTION INTRAVENOUS at 22:00

## 2024-01-01 RX ADMIN — TRIAMCINOLONE ACETONIDE 1 APPLICATION(S): 0.15 AEROSOL, SPRAY TOPICAL at 21:50

## 2024-01-01 RX ADMIN — SODIUM CHLORIDE 1 GRAM(S): 9 INJECTION, SOLUTION INTRAMUSCULAR; INTRAVENOUS; SUBCUTANEOUS at 06:49

## 2024-01-01 RX ADMIN — POVIDONE, POLYVINYL ALCOHOL 1 DROP(S): 20; 27 SOLUTION OPHTHALMIC at 18:31

## 2024-01-01 RX ADMIN — DICLOFENAC SODIUM 2 GRAM(S): 20 SOLUTION TOPICAL at 06:35

## 2024-01-01 RX ADMIN — INSULIN GLARGINE 20 UNIT(S): 100 INJECTION, SOLUTION SUBCUTANEOUS at 21:35

## 2024-01-01 RX ADMIN — FENTANYL 1 PATCH: 12 PATCH, EXTENDED RELEASE TRANSDERMAL at 06:38

## 2024-01-01 RX ADMIN — POVIDONE, POLYVINYL ALCOHOL 1 DROP(S): 20; 27 SOLUTION OPHTHALMIC at 04:44

## 2024-01-01 RX ADMIN — DICLOFENAC SODIUM 2 GRAM(S): 20 SOLUTION TOPICAL at 21:57

## 2024-01-01 RX ADMIN — WITCH HAZEL 1 APPLICATION(S): 50 SOLUTION RECTAL at 23:13

## 2024-01-01 RX ADMIN — LIDOCAINE 4 PATCH: 40 CREAM TOPICAL at 03:33

## 2024-01-01 RX ADMIN — CHLORHEXIDINE GLUCONATE 15 MILLILITER(S): 1.2 RINSE ORAL at 05:40

## 2024-01-01 RX ADMIN — Medication 1 SPRAY(S): at 17:53

## 2024-01-01 RX ADMIN — WITCH HAZEL 1 APPLICATION(S): 50 SOLUTION RECTAL at 17:42

## 2024-01-01 RX ADMIN — PANTOPRAZOLE SODIUM 40 MILLIGRAM(S): 40 TABLET, DELAYED RELEASE ORAL at 17:39

## 2024-01-01 RX ADMIN — PREDNISONE 5 MILLIGRAM(S): 20 TABLET ORAL at 12:22

## 2024-01-01 RX ADMIN — POVIDONE, POLYVINYL ALCOHOL 1 DROP(S): 20; 27 SOLUTION OPHTHALMIC at 11:51

## 2024-01-01 RX ADMIN — CEFEPIME 100 MILLIGRAM(S): 2 INJECTION, POWDER, FOR SOLUTION INTRAVENOUS at 17:08

## 2024-01-01 RX ADMIN — ESCITALOPRAM OXALATE 10 MILLIGRAM(S): 10 TABLET, FILM COATED ORAL at 17:52

## 2024-01-01 RX ADMIN — Medication 20 MILLIGRAM(S): at 10:02

## 2024-01-01 RX ADMIN — Medication 5 MILLILITER(S): at 12:04

## 2024-01-01 RX ADMIN — LIDOCAINE 4 PATCH: 40 CREAM TOPICAL at 19:29

## 2024-01-01 RX ADMIN — ACETAMINOPHEN 500MG 650 MILLIGRAM(S): 500 TABLET, COATED ORAL at 13:08

## 2024-01-01 RX ADMIN — NYSTATIN 1 APPLICATION(S): 100000 POWDER TOPICAL at 13:45

## 2024-01-01 RX ADMIN — SODIUM CHLORIDE 1 GRAM(S): 9 INJECTION, SOLUTION INTRAMUSCULAR; INTRAVENOUS; SUBCUTANEOUS at 13:33

## 2024-01-01 RX ADMIN — POVIDONE, POLYVINYL ALCOHOL 1 DROP(S): 20; 27 SOLUTION OPHTHALMIC at 12:16

## 2024-01-01 RX ADMIN — FENTANYL 1 PATCH: 12 PATCH, EXTENDED RELEASE TRANSDERMAL at 17:47

## 2024-01-01 RX ADMIN — SODIUM CHLORIDE 1 GRAM(S): 9 INJECTION, SOLUTION INTRAMUSCULAR; INTRAVENOUS; SUBCUTANEOUS at 22:56

## 2024-01-01 RX ADMIN — SODIUM CHLORIDE 1 GRAM(S): 9 INJECTION, SOLUTION INTRAMUSCULAR; INTRAVENOUS; SUBCUTANEOUS at 22:22

## 2024-01-01 RX ADMIN — TRIAMCINOLONE ACETONIDE 1 APPLICATION(S): 0.15 AEROSOL, SPRAY TOPICAL at 14:35

## 2024-01-01 RX ADMIN — ESCITALOPRAM OXALATE 10 MILLIGRAM(S): 10 TABLET, FILM COATED ORAL at 18:16

## 2024-01-01 RX ADMIN — POVIDONE, POLYVINYL ALCOHOL 1 DROP(S): 20; 27 SOLUTION OPHTHALMIC at 05:48

## 2024-01-01 RX ADMIN — WITCH HAZEL 1 APPLICATION(S): 50 SOLUTION RECTAL at 05:52

## 2024-01-01 RX ADMIN — OXYCODONE HYDROCHLORIDE 2.5 MILLIGRAM(S): 30 TABLET ORAL at 21:08

## 2024-01-01 RX ADMIN — Medication 1 SPRAY(S): at 14:26

## 2024-01-01 RX ADMIN — DIPHENHYDRAMINE HYDROCHLORIDE AND LIDOCAINE HYDROCHLORIDE AND ALUMINUM HYDROXIDE AND MAGNESIUM HYDRO 5 MILLILITER(S): KIT at 21:47

## 2024-01-01 RX ADMIN — INSULIN GLARGINE 12 UNIT(S): 100 INJECTION, SOLUTION SUBCUTANEOUS at 05:57

## 2024-01-01 RX ADMIN — CEFEPIME 100 MILLIGRAM(S): 2 INJECTION, POWDER, FOR SOLUTION INTRAVENOUS at 06:40

## 2024-01-01 RX ADMIN — POVIDONE, POLYVINYL ALCOHOL 1 DROP(S): 20; 27 SOLUTION OPHTHALMIC at 23:49

## 2024-01-01 RX ADMIN — Medication 5 MILLILITER(S): at 11:53

## 2024-01-01 RX ADMIN — Medication 15 MILLILITER(S): at 05:33

## 2024-01-01 RX ADMIN — CHLORHEXIDINE GLUCONATE 1 APPLICATION(S): 1.2 RINSE ORAL at 22:56

## 2024-01-01 RX ADMIN — Medication 50 MILLILITER(S): at 13:15

## 2024-01-01 RX ADMIN — Medication 1 SPRAY(S): at 11:32

## 2024-01-01 RX ADMIN — PREDNISONE 5 MILLIGRAM(S): 20 TABLET ORAL at 11:55

## 2024-01-01 RX ADMIN — ACETAMINOPHEN 500MG 400 MILLIGRAM(S): 500 TABLET, COATED ORAL at 13:36

## 2024-01-01 RX ADMIN — Medication 125 MILLIGRAM(S): at 11:22

## 2024-01-01 RX ADMIN — Medication 5 UNIT(S): at 05:35

## 2024-01-01 RX ADMIN — GABAPENTIN 250 MILLIGRAM(S): 300 CAPSULE ORAL at 21:22

## 2024-01-01 RX ADMIN — SODIUM CHLORIDE 1 GRAM(S): 9 INJECTION, SOLUTION INTRAMUSCULAR; INTRAVENOUS; SUBCUTANEOUS at 14:40

## 2024-01-01 RX ADMIN — IPRATROPIUM BROMIDE AND ALBUTEROL SULFATE 3 MILLILITER(S): 2.5; .5 SOLUTION RESPIRATORY (INHALATION) at 23:28

## 2024-01-01 RX ADMIN — Medication 6: at 23:35

## 2024-01-01 RX ADMIN — HYDROMORPHONE HYDROCHLORIDE 0.2 MILLIGRAM(S): 2 TABLET ORAL at 15:24

## 2024-01-01 RX ADMIN — GABAPENTIN 250 MILLIGRAM(S): 300 CAPSULE ORAL at 22:04

## 2024-01-01 RX ADMIN — SODIUM CHLORIDE 4 MILLILITER(S): 9 INJECTION, SOLUTION INTRAMUSCULAR; INTRAVENOUS; SUBCUTANEOUS at 23:00

## 2024-01-01 RX ADMIN — ACETAMINOPHEN 500MG 1000 MILLIGRAM(S): 500 TABLET, COATED ORAL at 23:30

## 2024-01-01 RX ADMIN — POVIDONE, POLYVINYL ALCOHOL 1 DROP(S): 20; 27 SOLUTION OPHTHALMIC at 13:22

## 2024-01-01 RX ADMIN — AMLODIPINE BESYLATE 10 MILLIGRAM(S): 10 TABLET ORAL at 11:45

## 2024-01-01 RX ADMIN — IPRATROPIUM BROMIDE AND ALBUTEROL SULFATE 3 MILLILITER(S): 2.5; .5 SOLUTION RESPIRATORY (INHALATION) at 23:09

## 2024-01-01 RX ADMIN — ACETAMINOPHEN, DIPHENHYDRAMINE HCL, PHENYLEPHRINE HCL 12 MILLIGRAM(S): 325; 25; 5 TABLET ORAL at 23:23

## 2024-01-01 RX ADMIN — SIMETHICONE 80 MILLIGRAM(S): 80 TABLET, CHEWABLE ORAL at 17:30

## 2024-01-01 RX ADMIN — CHLORHEXIDINE GLUCONATE 15 MILLILITER(S): 213 SOLUTION TOPICAL at 17:32

## 2024-01-01 RX ADMIN — INSULIN GLARGINE 15 UNIT(S): 100 INJECTION, SOLUTION SUBCUTANEOUS at 00:16

## 2024-01-01 RX ADMIN — Medication 1 SPRAY(S): at 14:05

## 2024-01-01 RX ADMIN — Medication 38 UNIT(S): at 12:12

## 2024-01-01 RX ADMIN — LIDOCAINE 4 PATCH: 40 CREAM TOPICAL at 15:55

## 2024-01-01 RX ADMIN — NYSTATIN 1 APPLICATION(S): 100000 POWDER TOPICAL at 05:55

## 2024-01-01 RX ADMIN — GABAPENTIN 250 MILLIGRAM(S): 300 CAPSULE ORAL at 21:10

## 2024-01-01 RX ADMIN — IPRATROPIUM BROMIDE AND ALBUTEROL SULFATE 3 MILLILITER(S): 2.5; .5 SOLUTION RESPIRATORY (INHALATION) at 17:40

## 2024-01-01 RX ADMIN — FENTANYL 1 PATCH: 12 PATCH, EXTENDED RELEASE TRANSDERMAL at 23:08

## 2024-01-01 RX ADMIN — IPRATROPIUM BROMIDE AND ALBUTEROL SULFATE 3 MILLILITER(S): 2.5; .5 SOLUTION RESPIRATORY (INHALATION) at 05:27

## 2024-01-01 RX ADMIN — Medication 4 UNIT(S): at 18:30

## 2024-01-01 RX ADMIN — IPRATROPIUM BROMIDE AND ALBUTEROL SULFATE 3 MILLILITER(S): 2.5; .5 SOLUTION RESPIRATORY (INHALATION) at 11:10

## 2024-01-01 RX ADMIN — WITCH HAZEL 1 APPLICATION(S): 50 SOLUTION RECTAL at 19:02

## 2024-01-01 RX ADMIN — ACETAMINOPHEN 500MG 1000 MILLIGRAM(S): 500 TABLET, COATED ORAL at 07:22

## 2024-01-01 RX ADMIN — DICLOFENAC SODIUM 2 GRAM(S): 20 SOLUTION TOPICAL at 23:57

## 2024-01-01 RX ADMIN — HYDROMORPHONE HYDROCHLORIDE 0.5 MILLIGRAM(S): 2 TABLET ORAL at 21:26

## 2024-01-01 RX ADMIN — Medication 2: at 12:28

## 2024-01-01 RX ADMIN — Medication 500 MILLIGRAM(S): at 12:29

## 2024-01-01 RX ADMIN — DICLOFENAC SODIUM 2 GRAM(S): 20 SOLUTION TOPICAL at 18:04

## 2024-01-01 RX ADMIN — Medication 10 UNIT(S): at 05:56

## 2024-01-01 RX ADMIN — CHLORHEXIDINE GLUCONATE 1 APPLICATION(S): 1.2 RINSE ORAL at 21:29

## 2024-01-01 RX ADMIN — Medication 2: at 23:35

## 2024-01-01 RX ADMIN — IPRATROPIUM BROMIDE AND ALBUTEROL SULFATE 3 MILLILITER(S): 2.5; .5 SOLUTION RESPIRATORY (INHALATION) at 23:15

## 2024-01-01 RX ADMIN — TRIAMCINOLONE ACETONIDE 1 APPLICATION(S): 0.15 AEROSOL, SPRAY TOPICAL at 13:18

## 2024-01-01 RX ADMIN — DIPHENHYDRAMINE HYDROCHLORIDE AND LIDOCAINE HYDROCHLORIDE AND ALUMINUM HYDROXIDE AND MAGNESIUM HYDRO 5 MILLILITER(S): KIT at 22:05

## 2024-01-01 RX ADMIN — DICLOFENAC SODIUM 2 GRAM(S): 20 SOLUTION TOPICAL at 18:38

## 2024-01-01 RX ADMIN — ACETAMINOPHEN, DIPHENHYDRAMINE HCL, PHENYLEPHRINE HCL 12 MILLIGRAM(S): 325; 25; 5 TABLET ORAL at 22:00

## 2024-01-01 RX ADMIN — Medication 5 MILLILITER(S): at 17:06

## 2024-01-01 RX ADMIN — CHLORHEXIDINE GLUCONATE 15 MILLILITER(S): 1.2 RINSE ORAL at 05:43

## 2024-01-01 RX ADMIN — Medication 125 MILLIGRAM(S): at 05:18

## 2024-01-01 RX ADMIN — SODIUM CHLORIDE 1 GRAM(S): 9 INJECTION, SOLUTION INTRAMUSCULAR; INTRAVENOUS; SUBCUTANEOUS at 15:39

## 2024-01-01 RX ADMIN — FENTANYL 1 PATCH: 12 PATCH, EXTENDED RELEASE TRANSDERMAL at 07:36

## 2024-01-01 RX ADMIN — PANTOPRAZOLE SODIUM 40 MILLIGRAM(S): 40 TABLET, DELAYED RELEASE ORAL at 04:42

## 2024-01-01 RX ADMIN — POVIDONE, POLYVINYL ALCOHOL 1 DROP(S): 20; 27 SOLUTION OPHTHALMIC at 18:16

## 2024-01-01 RX ADMIN — MIDODRINE HYDROCHLORIDE 10 MILLIGRAM(S): 5 TABLET ORAL at 14:38

## 2024-01-01 RX ADMIN — Medication 1 APPLICATION(S): at 22:25

## 2024-01-01 RX ADMIN — CEFEPIME 100 MILLIGRAM(S): 2 INJECTION, POWDER, FOR SOLUTION INTRAVENOUS at 06:43

## 2024-01-01 RX ADMIN — SODIUM CHLORIDE 1 GRAM(S): 9 INJECTION, SOLUTION INTRAMUSCULAR; INTRAVENOUS; SUBCUTANEOUS at 14:28

## 2024-01-01 RX ADMIN — OXYCODONE HYDROCHLORIDE 2.5 MILLIGRAM(S): 30 TABLET ORAL at 00:06

## 2024-01-01 RX ADMIN — IPRATROPIUM BROMIDE AND ALBUTEROL SULFATE 3 MILLILITER(S): 2.5; .5 SOLUTION RESPIRATORY (INHALATION) at 23:04

## 2024-01-01 RX ADMIN — NYSTATIN 1 APPLICATION(S): 100000 POWDER TOPICAL at 14:20

## 2024-01-01 RX ADMIN — LIDOCAINE 4 PATCH: 40 CREAM TOPICAL at 16:19

## 2024-01-01 RX ADMIN — PETROLATUM 1 APPLICATION(S): 960 OINTMENT TOPICAL at 21:11

## 2024-01-01 RX ADMIN — DIPHENHYDRAMINE HYDROCHLORIDE AND LIDOCAINE HYDROCHLORIDE AND ALUMINUM HYDROXIDE AND MAGNESIUM HYDRO 5 MILLILITER(S): KIT at 17:35

## 2024-01-01 RX ADMIN — Medication 2 MILLIGRAM(S): at 23:50

## 2024-01-01 RX ADMIN — SODIUM CHLORIDE 4 MILLILITER(S): 9 INJECTION, SOLUTION INTRAMUSCULAR; INTRAVENOUS; SUBCUTANEOUS at 17:14

## 2024-01-01 RX ADMIN — Medication 160 MILLIGRAM(S): at 10:20

## 2024-01-01 RX ADMIN — POVIDONE, POLYVINYL ALCOHOL 1 DROP(S): 20; 27 SOLUTION OPHTHALMIC at 23:28

## 2024-01-01 RX ADMIN — Medication 500 MILLIGRAM(S): at 12:08

## 2024-01-01 RX ADMIN — CEFEPIME 100 MILLIGRAM(S): 2 INJECTION, POWDER, FOR SOLUTION INTRAVENOUS at 21:58

## 2024-01-01 RX ADMIN — DICLOFENAC SODIUM 2 GRAM(S): 20 SOLUTION TOPICAL at 12:40

## 2024-01-01 RX ADMIN — CEFEPIME 100 MILLIGRAM(S): 2 INJECTION, POWDER, FOR SOLUTION INTRAVENOUS at 21:15

## 2024-01-01 RX ADMIN — Medication 1 APPLICATION(S): at 21:27

## 2024-01-01 RX ADMIN — Medication 4 MILLILITER(S): at 18:05

## 2024-01-01 RX ADMIN — CEFTOLOZANE AND TAZOBACTAM 33.33 MILLIGRAM(S): 1; .5 INJECTION, POWDER, LYOPHILIZED, FOR SOLUTION INTRAVENOUS at 06:26

## 2024-01-01 RX ADMIN — Medication 500 MILLIGRAM(S): at 11:40

## 2024-01-01 RX ADMIN — Medication 1 TABLET(S): at 12:23

## 2024-01-01 RX ADMIN — ACETAMINOPHEN 500MG 1000 MILLIGRAM(S): 500 TABLET, COATED ORAL at 23:37

## 2024-01-01 RX ADMIN — POVIDONE, POLYVINYL ALCOHOL 1 DROP(S): 20; 27 SOLUTION OPHTHALMIC at 19:14

## 2024-01-01 RX ADMIN — IPRATROPIUM BROMIDE AND ALBUTEROL SULFATE 3 MILLILITER(S): 2.5; .5 SOLUTION RESPIRATORY (INHALATION) at 23:07

## 2024-01-01 RX ADMIN — FENTANYL 1 PATCH: 12 PATCH, EXTENDED RELEASE TRANSDERMAL at 07:30

## 2024-01-01 RX ADMIN — CHLORHEXIDINE GLUCONATE 1 APPLICATION(S): 1.2 RINSE ORAL at 12:06

## 2024-01-01 RX ADMIN — PREDNISONE 5 MILLIGRAM(S): 20 TABLET ORAL at 08:40

## 2024-01-01 RX ADMIN — IPRATROPIUM BROMIDE AND ALBUTEROL SULFATE 3 MILLILITER(S): 2.5; .5 SOLUTION RESPIRATORY (INHALATION) at 14:32

## 2024-01-01 RX ADMIN — POVIDONE, POLYVINYL ALCOHOL 1 DROP(S): 20; 27 SOLUTION OPHTHALMIC at 05:33

## 2024-01-01 RX ADMIN — IPRATROPIUM BROMIDE AND ALBUTEROL SULFATE 3 MILLILITER(S): 2.5; .5 SOLUTION RESPIRATORY (INHALATION) at 11:19

## 2024-01-01 RX ADMIN — ACETAMINOPHEN 500MG 650 MILLIGRAM(S): 500 TABLET, COATED ORAL at 15:38

## 2024-01-01 RX ADMIN — WITCH HAZEL 1 APPLICATION(S): 50 SOLUTION RECTAL at 18:39

## 2024-01-01 RX ADMIN — Medication 250 MILLIGRAM(S): at 05:50

## 2024-01-01 RX ADMIN — FENTANYL 1 PATCH: 12 PATCH, EXTENDED RELEASE TRANSDERMAL at 09:00

## 2024-01-01 RX ADMIN — Medication 5 MILLILITER(S): at 11:47

## 2024-01-01 RX ADMIN — Medication 4: at 00:19

## 2024-01-01 RX ADMIN — Medication 1: at 05:54

## 2024-01-01 RX ADMIN — DICLOFENAC SODIUM 2 GRAM(S): 20 SOLUTION TOPICAL at 04:40

## 2024-01-01 RX ADMIN — CEFEPIME 100 MILLIGRAM(S): 2 INJECTION, POWDER, FOR SOLUTION INTRAVENOUS at 06:49

## 2024-01-01 RX ADMIN — ACETAMINOPHEN, DIPHENHYDRAMINE HCL, PHENYLEPHRINE HCL 12 MILLIGRAM(S): 325; 25; 5 TABLET ORAL at 23:07

## 2024-01-01 RX ADMIN — INSULIN GLARGINE 20 UNIT(S): 100 INJECTION, SOLUTION SUBCUTANEOUS at 23:10

## 2024-01-01 RX ADMIN — ACETAMINOPHEN 500MG 650 MILLIGRAM(S): 500 TABLET, COATED ORAL at 14:24

## 2024-01-01 RX ADMIN — DICLOFENAC SODIUM 2 GRAM(S): 20 SOLUTION TOPICAL at 05:41

## 2024-01-01 RX ADMIN — PANTOPRAZOLE SODIUM 40 MILLIGRAM(S): 40 TABLET, DELAYED RELEASE ORAL at 06:49

## 2024-01-01 RX ADMIN — Medication 1 SPRAY(S): at 00:01

## 2024-01-01 RX ADMIN — Medication 5 MILLILITER(S): at 12:21

## 2024-01-01 RX ADMIN — IPRATROPIUM BROMIDE AND ALBUTEROL SULFATE 3 MILLILITER(S): 2.5; .5 SOLUTION RESPIRATORY (INHALATION) at 06:22

## 2024-01-01 RX ADMIN — Medication 14 UNIT(S): at 06:41

## 2024-01-01 RX ADMIN — Medication 125 MICROGRAM(S): at 05:50

## 2024-01-01 RX ADMIN — IPRATROPIUM BROMIDE AND ALBUTEROL SULFATE 3 MILLILITER(S): 2.5; .5 SOLUTION RESPIRATORY (INHALATION) at 17:25

## 2024-01-01 RX ADMIN — SODIUM BICARBONATE 50 MILLIEQUIVALENT(S): 84 INJECTION, SOLUTION INTRAVENOUS at 10:26

## 2024-01-01 RX ADMIN — INSULIN GLARGINE 18 UNIT(S): 100 INJECTION, SOLUTION SUBCUTANEOUS at 06:12

## 2024-01-01 RX ADMIN — POTASSIUM CHLORIDE 100 MILLIEQUIVALENT(S): 600 TABLET, EXTENDED RELEASE ORAL at 14:31

## 2024-01-01 RX ADMIN — NYSTATIN 1 APPLICATION(S): 100000 POWDER TOPICAL at 06:18

## 2024-01-01 RX ADMIN — Medication 125 MILLIGRAM(S): at 06:11

## 2024-01-01 RX ADMIN — DICLOFENAC SODIUM 2 GRAM(S): 20 SOLUTION TOPICAL at 17:23

## 2024-01-01 RX ADMIN — Medication 2: at 19:45

## 2024-01-01 RX ADMIN — POVIDONE, POLYVINYL ALCOHOL 1 DROP(S): 20; 27 SOLUTION OPHTHALMIC at 22:43

## 2024-01-01 RX ADMIN — POVIDONE, POLYVINYL ALCOHOL 1 DROP(S): 20; 27 SOLUTION OPHTHALMIC at 11:18

## 2024-01-01 RX ADMIN — PREDNISOLONE ACETATE 1 DROP(S): 1.2 SUSPENSION/ DROPS OPHTHALMIC at 11:32

## 2024-01-01 RX ADMIN — NYSTATIN 1 APPLICATION(S): 100000 POWDER TOPICAL at 05:41

## 2024-01-01 RX ADMIN — Medication 4: at 12:36

## 2024-01-01 RX ADMIN — GABAPENTIN 250 MILLIGRAM(S): 300 CAPSULE ORAL at 22:25

## 2024-01-01 RX ADMIN — DICLOFENAC SODIUM 2 GRAM(S): 20 SOLUTION TOPICAL at 17:45

## 2024-01-01 RX ADMIN — LIDOCAINE 2 PATCH: 40 CREAM TOPICAL at 17:58

## 2024-01-01 RX ADMIN — Medication 1: at 02:06

## 2024-01-01 RX ADMIN — IPRATROPIUM BROMIDE AND ALBUTEROL SULFATE 3 MILLILITER(S): 2.5; .5 SOLUTION RESPIRATORY (INHALATION) at 17:17

## 2024-01-01 RX ADMIN — CHLORHEXIDINE GLUCONATE 15 MILLILITER(S): 1.2 RINSE ORAL at 08:41

## 2024-01-01 RX ADMIN — SODIUM CHLORIDE 1 GRAM(S): 9 INJECTION, SOLUTION INTRAMUSCULAR; INTRAVENOUS; SUBCUTANEOUS at 05:24

## 2024-01-01 RX ADMIN — ACETAMINOPHEN 500MG 650 MILLIGRAM(S): 500 TABLET, COATED ORAL at 06:35

## 2024-01-01 RX ADMIN — LIDOCAINE 4 PATCH: 40 CREAM TOPICAL at 05:11

## 2024-01-01 RX ADMIN — Medication 5 UNIT(S): at 12:32

## 2024-01-01 RX ADMIN — Medication 125 MICROGRAM(S): at 06:40

## 2024-01-01 RX ADMIN — ESCITALOPRAM OXALATE 10 MILLIGRAM(S): 10 TABLET, FILM COATED ORAL at 17:24

## 2024-01-01 RX ADMIN — WITCH HAZEL 1 APPLICATION(S): 50 SOLUTION RECTAL at 06:17

## 2024-01-01 RX ADMIN — CHLORHEXIDINE GLUCONATE 1 APPLICATION(S): 1.2 RINSE ORAL at 22:45

## 2024-01-01 RX ADMIN — MEROPENEM 100 MILLIGRAM(S): 500 INJECTION, POWDER, FOR SOLUTION INTRAVENOUS at 22:16

## 2024-01-01 RX ADMIN — Medication 125 MICROGRAM(S): at 05:41

## 2024-01-01 RX ADMIN — Medication 4 MILLIGRAM(S): at 12:25

## 2024-01-01 RX ADMIN — FENTANYL 1 PATCH: 12 PATCH, EXTENDED RELEASE TRANSDERMAL at 10:50

## 2024-01-01 RX ADMIN — METRONIDAZOLE 100 MILLIGRAM(S): 500 TABLET ORAL at 14:01

## 2024-01-01 RX ADMIN — WITCH HAZEL 1 APPLICATION(S): 50 SOLUTION RECTAL at 21:29

## 2024-01-01 RX ADMIN — CHLORHEXIDINE GLUCONATE 15 MILLILITER(S): 1.2 RINSE ORAL at 06:47

## 2024-01-01 RX ADMIN — PREDNISOLONE ACETATE 1 DROP(S): 1.2 SUSPENSION/ DROPS OPHTHALMIC at 12:43

## 2024-01-01 RX ADMIN — MIDODRINE HYDROCHLORIDE 10 MILLIGRAM(S): 5 TABLET ORAL at 05:21

## 2024-01-01 RX ADMIN — Medication 42 UNIT(S): at 13:08

## 2024-01-01 RX ADMIN — LIDOCAINE 1 PATCH: 40 CREAM TOPICAL at 07:13

## 2024-01-01 RX ADMIN — CHLORHEXIDINE GLUCONATE 1 APPLICATION(S): 1.2 RINSE ORAL at 21:52

## 2024-01-01 RX ADMIN — ACETAMINOPHEN 500MG 650 MILLIGRAM(S): 500 TABLET, COATED ORAL at 14:56

## 2024-01-01 RX ADMIN — Medication 1: at 00:26

## 2024-01-01 RX ADMIN — ACETAMINOPHEN 500MG 1000 MILLIGRAM(S): 500 TABLET, COATED ORAL at 15:41

## 2024-01-01 RX ADMIN — IPRATROPIUM BROMIDE AND ALBUTEROL SULFATE 3 MILLILITER(S): 2.5; .5 SOLUTION RESPIRATORY (INHALATION) at 11:17

## 2024-01-01 RX ADMIN — Medication 1 TABLET(S): at 11:21

## 2024-01-01 RX ADMIN — TRIAMCINOLONE ACETONIDE 1 APPLICATION(S): 0.15 AEROSOL, SPRAY TOPICAL at 13:33

## 2024-01-01 RX ADMIN — Medication 125 MILLIGRAM(S): at 17:37

## 2024-01-01 RX ADMIN — Medication 12.5 MILLIGRAM(S): at 15:40

## 2024-01-01 RX ADMIN — SODIUM CHLORIDE 4 MILLILITER(S): 9 INJECTION, SOLUTION INTRAMUSCULAR; INTRAVENOUS; SUBCUTANEOUS at 23:30

## 2024-01-01 RX ADMIN — MEROPENEM 100 MILLIGRAM(S): 500 INJECTION, POWDER, FOR SOLUTION INTRAVENOUS at 05:38

## 2024-01-01 RX ADMIN — CHLORHEXIDINE GLUCONATE 15 MILLILITER(S): 1.2 RINSE ORAL at 17:12

## 2024-01-01 RX ADMIN — Medication 18 UNIT(S): at 13:10

## 2024-01-01 RX ADMIN — CEFEPIME 100 MILLIGRAM(S): 2 INJECTION, POWDER, FOR SOLUTION INTRAVENOUS at 06:47

## 2024-01-01 RX ADMIN — PANTOPRAZOLE SODIUM 40 MILLIGRAM(S): 40 TABLET, DELAYED RELEASE ORAL at 17:59

## 2024-01-01 RX ADMIN — POVIDONE, POLYVINYL ALCOHOL 1 DROP(S): 20; 27 SOLUTION OPHTHALMIC at 23:32

## 2024-01-01 RX ADMIN — DIPHENHYDRAMINE HYDROCHLORIDE AND LIDOCAINE HYDROCHLORIDE AND ALUMINUM HYDROXIDE AND MAGNESIUM HYDRO 5 MILLILITER(S): KIT at 22:01

## 2024-01-01 RX ADMIN — PANTOPRAZOLE SODIUM 40 MILLIGRAM(S): 40 TABLET, DELAYED RELEASE ORAL at 17:18

## 2024-01-01 RX ADMIN — CHLORHEXIDINE GLUCONATE 1 APPLICATION(S): 1.2 RINSE ORAL at 21:30

## 2024-01-01 RX ADMIN — WITCH HAZEL 1 APPLICATION(S): 50 SOLUTION RECTAL at 05:14

## 2024-01-01 RX ADMIN — WITCH HAZEL 1 APPLICATION(S): 50 SOLUTION RECTAL at 06:19

## 2024-01-01 RX ADMIN — PREDNISONE 5 MILLIGRAM(S): 20 TABLET ORAL at 12:06

## 2024-01-01 RX ADMIN — DICLOFENAC SODIUM 2 GRAM(S): 20 SOLUTION TOPICAL at 17:09

## 2024-01-01 RX ADMIN — POTASSIUM CHLORIDE 100 MILLIEQUIVALENT(S): 600 TABLET, EXTENDED RELEASE ORAL at 11:33

## 2024-01-01 RX ADMIN — ACETAMINOPHEN 500MG 650 MILLIGRAM(S): 500 TABLET, COATED ORAL at 23:25

## 2024-01-01 RX ADMIN — Medication 5 MILLILITER(S): at 17:36

## 2024-01-01 RX ADMIN — FENTANYL 1 PATCH: 12 PATCH, EXTENDED RELEASE TRANSDERMAL at 23:15

## 2024-01-01 RX ADMIN — PREDNISONE 5 MILLIGRAM(S): 20 TABLET ORAL at 08:56

## 2024-01-01 RX ADMIN — LIDOCAINE 1 PATCH: 40 CREAM TOPICAL at 06:56

## 2024-01-01 RX ADMIN — ACETAMINOPHEN 500MG 650 MILLIGRAM(S): 500 TABLET, COATED ORAL at 22:03

## 2024-01-01 RX ADMIN — DICLOFENAC SODIUM 2 GRAM(S): 20 SOLUTION TOPICAL at 18:40

## 2024-01-01 RX ADMIN — Medication 125 MICROGRAM(S): at 05:03

## 2024-01-01 RX ADMIN — IPRATROPIUM BROMIDE AND ALBUTEROL SULFATE 3 MILLILITER(S): 2.5; .5 SOLUTION RESPIRATORY (INHALATION) at 00:38

## 2024-01-01 RX ADMIN — NYSTATIN 1 APPLICATION(S): 100000 POWDER TOPICAL at 14:28

## 2024-01-01 RX ADMIN — Medication 125 MILLIGRAM(S): at 23:22

## 2024-01-01 RX ADMIN — INSULIN GLARGINE 10 UNIT(S): 100 INJECTION, SOLUTION SUBCUTANEOUS at 21:22

## 2024-01-01 RX ADMIN — POTASSIUM CHLORIDE 100 MILLIEQUIVALENT(S): 600 TABLET, EXTENDED RELEASE ORAL at 08:35

## 2024-01-01 RX ADMIN — Medication 160 MILLIGRAM(S): at 12:02

## 2024-01-01 RX ADMIN — Medication 5 MILLILITER(S): at 11:01

## 2024-01-01 RX ADMIN — POVIDONE, POLYVINYL ALCOHOL 1 DROP(S): 20; 27 SOLUTION OPHTHALMIC at 12:19

## 2024-01-01 RX ADMIN — Medication 5 MILLILITER(S): at 05:12

## 2024-01-01 RX ADMIN — NYSTATIN 1 APPLICATION(S): 100000 POWDER TOPICAL at 21:41

## 2024-01-01 RX ADMIN — MEROPENEM 100 MILLIGRAM(S): 500 INJECTION, POWDER, FOR SOLUTION INTRAVENOUS at 21:27

## 2024-01-01 RX ADMIN — ACETAMINOPHEN, DIPHENHYDRAMINE HCL, PHENYLEPHRINE HCL 12 MILLIGRAM(S): 325; 25; 5 TABLET ORAL at 21:25

## 2024-01-01 RX ADMIN — ESCITALOPRAM OXALATE 10 MILLIGRAM(S): 10 TABLET, FILM COATED ORAL at 17:42

## 2024-01-01 RX ADMIN — Medication 1 SPRAY(S): at 12:11

## 2024-01-01 RX ADMIN — Medication 12: at 17:46

## 2024-01-01 RX ADMIN — Medication 1 TABLET(S): at 12:14

## 2024-01-01 RX ADMIN — PREDNISOLONE ACETATE 1 DROP(S): 1.2 SUSPENSION/ DROPS OPHTHALMIC at 23:48

## 2024-01-01 RX ADMIN — Medication 5 MILLILITER(S): at 12:12

## 2024-01-01 RX ADMIN — DICLOFENAC SODIUM 2 GRAM(S): 20 SOLUTION TOPICAL at 18:02

## 2024-01-01 RX ADMIN — PREDNISOLONE ACETATE 1 DROP(S): 1.2 SUSPENSION/ DROPS OPHTHALMIC at 23:25

## 2024-01-01 RX ADMIN — Medication 2: at 05:10

## 2024-01-01 RX ADMIN — Medication 8: at 06:42

## 2024-01-01 RX ADMIN — PANTOPRAZOLE SODIUM 40 MILLIGRAM(S): 40 TABLET, DELAYED RELEASE ORAL at 06:09

## 2024-01-01 RX ADMIN — Medication 1 SPRAY(S): at 06:19

## 2024-01-01 RX ADMIN — PREDNISONE 5 MILLIGRAM(S): 20 TABLET ORAL at 06:14

## 2024-01-01 RX ADMIN — Medication 125 MILLIGRAM(S): at 23:51

## 2024-01-01 RX ADMIN — Medication 6: at 17:31

## 2024-01-01 RX ADMIN — PREDNISONE 5 MILLIGRAM(S): 20 TABLET ORAL at 09:31

## 2024-01-01 RX ADMIN — GABAPENTIN 250 MILLIGRAM(S): 300 CAPSULE ORAL at 22:29

## 2024-01-01 RX ADMIN — GABAPENTIN 250 MILLIGRAM(S): 300 CAPSULE ORAL at 21:30

## 2024-01-01 RX ADMIN — CEFTOLOZANE AND TAZOBACTAM 33.33 MILLIGRAM(S): 1; .5 INJECTION, POWDER, LYOPHILIZED, FOR SOLUTION INTRAVENOUS at 13:38

## 2024-01-01 RX ADMIN — Medication 1 APPLICATION(S): at 23:09

## 2024-01-01 RX ADMIN — Medication 6: at 23:58

## 2024-01-01 RX ADMIN — TRIAMCINOLONE ACETONIDE 1 APPLICATION(S): 0.15 AEROSOL, SPRAY TOPICAL at 22:06

## 2024-01-01 RX ADMIN — DICLOFENAC SODIUM 2 GRAM(S): 20 SOLUTION TOPICAL at 23:05

## 2024-01-01 RX ADMIN — Medication 125 MICROGRAM(S): at 04:39

## 2024-01-01 RX ADMIN — LIDOCAINE 1 PATCH: 40 CREAM TOPICAL at 05:15

## 2024-01-01 RX ADMIN — Medication 12.5 MILLIGRAM(S): at 22:29

## 2024-01-01 RX ADMIN — LIDOCAINE 4 PATCH: 40 CREAM TOPICAL at 19:01

## 2024-01-01 RX ADMIN — GABAPENTIN 250 MILLIGRAM(S): 300 CAPSULE ORAL at 23:18

## 2024-01-01 RX ADMIN — Medication 125 MILLIGRAM(S): at 12:21

## 2024-01-01 RX ADMIN — Medication 18 UNIT(S): at 05:49

## 2024-01-01 RX ADMIN — PREDNISOLONE ACETATE 1 DROP(S): 1.2 SUSPENSION/ DROPS OPHTHALMIC at 00:23

## 2024-01-01 RX ADMIN — PREDNISOLONE ACETATE 1 DROP(S): 1.2 SUSPENSION/ DROPS OPHTHALMIC at 05:02

## 2024-01-01 RX ADMIN — ACETAMINOPHEN 500MG 650 MILLIGRAM(S): 500 TABLET, COATED ORAL at 23:08

## 2024-01-01 RX ADMIN — Medication 125 MICROGRAM(S): at 04:15

## 2024-01-01 RX ADMIN — PREDNISOLONE ACETATE 1 DROP(S): 1.2 SUSPENSION/ DROPS OPHTHALMIC at 05:36

## 2024-01-01 RX ADMIN — IPRATROPIUM BROMIDE AND ALBUTEROL SULFATE 3 MILLILITER(S): 2.5; .5 SOLUTION RESPIRATORY (INHALATION) at 00:40

## 2024-01-01 RX ADMIN — ACETAMINOPHEN, DIPHENHYDRAMINE HCL, PHENYLEPHRINE HCL 12 MILLIGRAM(S): 325; 25; 5 TABLET ORAL at 23:50

## 2024-01-01 RX ADMIN — PREDNISOLONE ACETATE 1 DROP(S): 1.2 SUSPENSION/ DROPS OPHTHALMIC at 04:44

## 2024-01-01 RX ADMIN — Medication 70 MILLILITER(S): at 22:02

## 2024-01-01 RX ADMIN — POVIDONE, POLYVINYL ALCOHOL 1 DROP(S): 20; 27 SOLUTION OPHTHALMIC at 12:43

## 2024-01-01 RX ADMIN — Medication 2: at 18:01

## 2024-01-01 RX ADMIN — Medication 125 MILLIGRAM(S): at 12:09

## 2024-01-01 RX ADMIN — ACETAMINOPHEN 500MG 1000 MILLIGRAM(S): 500 TABLET, COATED ORAL at 06:46

## 2024-01-01 RX ADMIN — SODIUM CHLORIDE 1 GRAM(S): 9 INJECTION, SOLUTION INTRAMUSCULAR; INTRAVENOUS; SUBCUTANEOUS at 21:24

## 2024-01-01 RX ADMIN — POVIDONE, POLYVINYL ALCOHOL 1 DROP(S): 20; 27 SOLUTION OPHTHALMIC at 18:17

## 2024-01-01 RX ADMIN — Medication 12.5 MILLIGRAM(S): at 18:21

## 2024-01-01 RX ADMIN — DIPHENHYDRAMINE HYDROCHLORIDE AND LIDOCAINE HYDROCHLORIDE AND ALUMINUM HYDROXIDE AND MAGNESIUM HYDRO 5 MILLILITER(S): KIT at 06:10

## 2024-01-01 RX ADMIN — Medication 3 MILLILITER(S): at 11:22

## 2024-01-01 RX ADMIN — Medication 2: at 20:48

## 2024-01-01 RX ADMIN — POVIDONE, POLYVINYL ALCOHOL 1 DROP(S): 20; 27 SOLUTION OPHTHALMIC at 00:05

## 2024-01-01 RX ADMIN — DICLOFENAC SODIUM 2 GRAM(S): 20 SOLUTION TOPICAL at 17:56

## 2024-01-01 RX ADMIN — CHLORHEXIDINE GLUCONATE 15 MILLILITER(S): 1.2 RINSE ORAL at 17:44

## 2024-01-01 RX ADMIN — Medication 5 MILLILITER(S): at 12:11

## 2024-01-01 RX ADMIN — Medication 30 MILLILITER(S): at 18:36

## 2024-01-01 RX ADMIN — Medication 1 SPRAY(S): at 17:44

## 2024-01-01 RX ADMIN — CHLORHEXIDINE GLUCONATE 15 MILLILITER(S): 1.2 RINSE ORAL at 23:26

## 2024-01-01 RX ADMIN — ACETAMINOPHEN, DIPHENHYDRAMINE HCL, PHENYLEPHRINE HCL 12 MILLIGRAM(S): 325; 25; 5 TABLET ORAL at 21:50

## 2024-01-01 RX ADMIN — HYDRALAZINE HYDROCHLORIDE 10 MILLIGRAM(S): 10 TABLET ORAL at 14:17

## 2024-01-01 RX ADMIN — Medication 5 MILLILITER(S): at 22:33

## 2024-01-01 RX ADMIN — PREDNISOLONE ACETATE 1 DROP(S): 1.2 SUSPENSION/ DROPS OPHTHALMIC at 22:29

## 2024-01-01 RX ADMIN — FENTANYL 1 PATCH: 12 PATCH, EXTENDED RELEASE TRANSDERMAL at 07:13

## 2024-01-01 RX ADMIN — DIPHENHYDRAMINE HYDROCHLORIDE AND LIDOCAINE HYDROCHLORIDE AND ALUMINUM HYDROXIDE AND MAGNESIUM HYDRO 5 MILLILITER(S): KIT at 13:12

## 2024-01-01 RX ADMIN — Medication 1 TABLET(S): at 13:00

## 2024-01-01 RX ADMIN — CHLORHEXIDINE GLUCONATE 1 APPLICATION(S): 1.2 RINSE ORAL at 11:19

## 2024-01-01 RX ADMIN — OXYCODONE HYDROCHLORIDE 2.5 MILLIGRAM(S): 30 TABLET ORAL at 00:42

## 2024-01-01 RX ADMIN — DICLOFENAC SODIUM 2 GRAM(S): 20 SOLUTION TOPICAL at 05:39

## 2024-01-01 RX ADMIN — DICLOFENAC SODIUM 2 GRAM(S): 20 SOLUTION TOPICAL at 12:39

## 2024-01-01 RX ADMIN — PANTOPRAZOLE SODIUM 40 MILLIGRAM(S): 40 TABLET, DELAYED RELEASE ORAL at 17:45

## 2024-01-01 RX ADMIN — PREDNISOLONE ACETATE 1 DROP(S): 1.2 SUSPENSION/ DROPS OPHTHALMIC at 18:09

## 2024-01-01 RX ADMIN — DIPHENHYDRAMINE HYDROCHLORIDE AND LIDOCAINE HYDROCHLORIDE AND ALUMINUM HYDROXIDE AND MAGNESIUM HYDRO 5 MILLILITER(S): KIT at 22:24

## 2024-01-01 RX ADMIN — Medication 5 MILLILITER(S): at 22:46

## 2024-01-01 RX ADMIN — Medication 1 TABLET(S): at 11:44

## 2024-01-01 RX ADMIN — SODIUM CHLORIDE 1 GRAM(S): 9 INJECTION, SOLUTION INTRAMUSCULAR; INTRAVENOUS; SUBCUTANEOUS at 05:13

## 2024-01-01 RX ADMIN — POVIDONE, POLYVINYL ALCOHOL 1 DROP(S): 20; 27 SOLUTION OPHTHALMIC at 23:46

## 2024-01-01 RX ADMIN — SODIUM CHLORIDE 1 GRAM(S): 9 INJECTION, SOLUTION INTRAMUSCULAR; INTRAVENOUS; SUBCUTANEOUS at 05:23

## 2024-01-01 RX ADMIN — DICLOFENAC SODIUM 2 GRAM(S): 20 SOLUTION TOPICAL at 23:12

## 2024-01-01 RX ADMIN — WITCH HAZEL 1 APPLICATION(S): 50 SOLUTION RECTAL at 06:44

## 2024-01-01 RX ADMIN — Medication 1 APPLICATION(S): at 00:06

## 2024-01-01 RX ADMIN — HYDRALAZINE HYDROCHLORIDE 10 MILLIGRAM(S): 10 TABLET ORAL at 13:16

## 2024-01-01 RX ADMIN — WITCH HAZEL 1 APPLICATION(S): 50 SOLUTION RECTAL at 04:45

## 2024-01-01 RX ADMIN — NYSTATIN 1 APPLICATION(S): 100000 POWDER TOPICAL at 15:19

## 2024-01-01 RX ADMIN — CHLORHEXIDINE GLUCONATE 15 MILLILITER(S): 1.2 RINSE ORAL at 05:33

## 2024-01-01 RX ADMIN — SODIUM CHLORIDE 1 GRAM(S): 9 INJECTION, SOLUTION INTRAMUSCULAR; INTRAVENOUS; SUBCUTANEOUS at 13:36

## 2024-01-01 RX ADMIN — ACETAMINOPHEN 500MG 650 MILLIGRAM(S): 500 TABLET, COATED ORAL at 12:48

## 2024-01-01 RX ADMIN — Medication 6: at 12:18

## 2024-01-01 RX ADMIN — POVIDONE, POLYVINYL ALCOHOL 1 DROP(S): 20; 27 SOLUTION OPHTHALMIC at 17:44

## 2024-01-01 RX ADMIN — IPRATROPIUM BROMIDE AND ALBUTEROL SULFATE 3 MILLILITER(S): 2.5; .5 SOLUTION RESPIRATORY (INHALATION) at 17:52

## 2024-01-01 RX ADMIN — CHLORHEXIDINE GLUCONATE 15 MILLILITER(S): 1.2 RINSE ORAL at 05:27

## 2024-01-01 RX ADMIN — ACETAMINOPHEN 500MG 650 MILLIGRAM(S): 500 TABLET, COATED ORAL at 21:30

## 2024-01-01 RX ADMIN — Medication 5 MILLILITER(S): at 06:36

## 2024-01-01 RX ADMIN — PREDNISONE 5 MILLIGRAM(S): 20 TABLET ORAL at 10:31

## 2024-01-01 RX ADMIN — Medication 500 MILLIGRAM(S): at 11:17

## 2024-01-01 RX ADMIN — SODIUM CHLORIDE 1 GRAM(S): 9 INJECTION, SOLUTION INTRAMUSCULAR; INTRAVENOUS; SUBCUTANEOUS at 18:05

## 2024-01-01 RX ADMIN — Medication 125 MILLIGRAM(S): at 06:37

## 2024-01-01 RX ADMIN — DICLOFENAC SODIUM 2 GRAM(S): 20 SOLUTION TOPICAL at 18:01

## 2024-01-01 RX ADMIN — Medication 50 MILLILITER(S): at 05:19

## 2024-01-01 RX ADMIN — CHLORHEXIDINE GLUCONATE 15 MILLILITER(S): 1.2 RINSE ORAL at 05:02

## 2024-01-01 RX ADMIN — NYSTATIN 1 APPLICATION(S): 100000 POWDER TOPICAL at 15:54

## 2024-01-01 RX ADMIN — NYSTATIN 1 APPLICATION(S): 100000 POWDER TOPICAL at 13:53

## 2024-01-01 RX ADMIN — SIMETHICONE 80 MILLIGRAM(S): 80 TABLET, CHEWABLE ORAL at 05:13

## 2024-01-01 RX ADMIN — HYDROMORPHONE HYDROCHLORIDE 0.5 MILLIGRAM(S): 2 TABLET ORAL at 13:56

## 2024-01-01 RX ADMIN — HYDROMORPHONE HYDROCHLORIDE 0.5 MILLIGRAM(S): 2 TABLET ORAL at 21:56

## 2024-01-01 RX ADMIN — LIDOCAINE 4 PATCH: 40 CREAM TOPICAL at 19:58

## 2024-01-01 RX ADMIN — Medication 1 SPRAY(S): at 11:42

## 2024-01-01 RX ADMIN — DICLOFENAC SODIUM 2 GRAM(S): 20 SOLUTION TOPICAL at 17:57

## 2024-01-01 RX ADMIN — Medication 0: at 06:22

## 2024-01-01 RX ADMIN — IPRATROPIUM BROMIDE AND ALBUTEROL SULFATE 3 MILLILITER(S): 2.5; .5 SOLUTION RESPIRATORY (INHALATION) at 18:02

## 2024-01-01 RX ADMIN — SODIUM CHLORIDE 4 MILLILITER(S): 9 INJECTION, SOLUTION INTRAMUSCULAR; INTRAVENOUS; SUBCUTANEOUS at 05:50

## 2024-01-01 RX ADMIN — HYDROMORPHONE HYDROCHLORIDE 0.5 MILLIGRAM(S): 2 TABLET ORAL at 06:47

## 2024-01-01 RX ADMIN — Medication 5 MILLILITER(S): at 17:38

## 2024-01-01 RX ADMIN — Medication 125 MILLIGRAM(S): at 11:40

## 2024-01-01 RX ADMIN — IPRATROPIUM BROMIDE AND ALBUTEROL SULFATE 3 MILLILITER(S): 2.5; .5 SOLUTION RESPIRATORY (INHALATION) at 12:51

## 2024-01-01 RX ADMIN — CEFTOLOZANE AND TAZOBACTAM 33.33 MILLIGRAM(S): 1; .5 INJECTION, POWDER, LYOPHILIZED, FOR SOLUTION INTRAVENOUS at 22:01

## 2024-01-01 RX ADMIN — Medication 10 UNIT(S): at 06:23

## 2024-01-01 RX ADMIN — Medication 1 APPLICATION(S): at 21:33

## 2024-01-01 RX ADMIN — METRONIDAZOLE 100 MILLIGRAM(S): 500 TABLET ORAL at 21:51

## 2024-01-01 RX ADMIN — NYSTATIN 1 APPLICATION(S): 100000 POWDER TOPICAL at 06:07

## 2024-01-01 RX ADMIN — Medication 1 SPRAY(S): at 23:52

## 2024-01-01 RX ADMIN — IPRATROPIUM BROMIDE AND ALBUTEROL SULFATE 3 MILLILITER(S): 2.5; .5 SOLUTION RESPIRATORY (INHALATION) at 11:22

## 2024-01-01 RX ADMIN — POVIDONE, POLYVINYL ALCOHOL 1 DROP(S): 20; 27 SOLUTION OPHTHALMIC at 17:47

## 2024-01-01 RX ADMIN — Medication 1 SPRAY(S): at 17:59

## 2024-01-01 RX ADMIN — ESCITALOPRAM OXALATE 10 MILLIGRAM(S): 10 TABLET, FILM COATED ORAL at 17:12

## 2024-01-01 RX ADMIN — Medication 8: at 23:45

## 2024-01-01 RX ADMIN — Medication 1 TABLET(S): at 11:06

## 2024-01-01 RX ADMIN — NYSTATIN 1 APPLICATION(S): 100000 POWDER TOPICAL at 21:11

## 2024-01-01 RX ADMIN — Medication 1: at 18:51

## 2024-01-01 RX ADMIN — Medication 250 MILLIGRAM(S): at 13:35

## 2024-01-01 RX ADMIN — POVIDONE, POLYVINYL ALCOHOL 1 DROP(S): 20; 27 SOLUTION OPHTHALMIC at 05:58

## 2024-01-01 RX ADMIN — CHLORHEXIDINE GLUCONATE 15 MILLILITER(S): 1.2 RINSE ORAL at 18:08

## 2024-01-01 RX ADMIN — Medication 5 MILLILITER(S): at 05:39

## 2024-01-01 RX ADMIN — Medication 500 MILLIGRAM(S): at 11:24

## 2024-01-01 RX ADMIN — Medication 1 SPRAY(S): at 18:38

## 2024-01-01 RX ADMIN — SODIUM CHLORIDE 1 GRAM(S): 9 INJECTION, SOLUTION INTRAMUSCULAR; INTRAVENOUS; SUBCUTANEOUS at 18:21

## 2024-01-01 RX ADMIN — LIDOCAINE 4 PATCH: 40 CREAM TOPICAL at 19:22

## 2024-01-01 RX ADMIN — FENTANYL 1 PATCH: 12 PATCH, EXTENDED RELEASE TRANSDERMAL at 20:30

## 2024-01-01 RX ADMIN — Medication 160 MILLIGRAM(S): at 11:21

## 2024-01-01 RX ADMIN — LIDOCAINE 4 PATCH: 40 CREAM TOPICAL at 18:13

## 2024-01-01 RX ADMIN — ACETAMINOPHEN 500MG 650 MILLIGRAM(S): 500 TABLET, COATED ORAL at 13:06

## 2024-01-01 RX ADMIN — HYDROMORPHONE HYDROCHLORIDE 0.5 MILLIGRAM(S): 2 TABLET ORAL at 02:48

## 2024-01-01 RX ADMIN — FENTANYL 1 PATCH: 12 PATCH, EXTENDED RELEASE TRANSDERMAL at 09:18

## 2024-01-01 RX ADMIN — Medication 2 PACKET(S): at 11:31

## 2024-01-01 RX ADMIN — IPRATROPIUM BROMIDE AND ALBUTEROL SULFATE 3 MILLILITER(S): 2.5; .5 SOLUTION RESPIRATORY (INHALATION) at 23:38

## 2024-01-01 RX ADMIN — POVIDONE, POLYVINYL ALCOHOL 1 DROP(S): 20; 27 SOLUTION OPHTHALMIC at 17:56

## 2024-01-01 RX ADMIN — PANTOPRAZOLE SODIUM 40 MILLIGRAM(S): 40 TABLET, DELAYED RELEASE ORAL at 05:11

## 2024-01-01 RX ADMIN — Medication 25 GRAM(S): at 10:21

## 2024-01-01 RX ADMIN — IPRATROPIUM BROMIDE AND ALBUTEROL SULFATE 3 MILLILITER(S): 2.5; .5 SOLUTION RESPIRATORY (INHALATION) at 11:33

## 2024-01-01 RX ADMIN — Medication 125 MILLIGRAM(S): at 23:47

## 2024-01-01 RX ADMIN — MIDODRINE HYDROCHLORIDE 10 MILLIGRAM(S): 5 TABLET ORAL at 15:40

## 2024-01-01 RX ADMIN — Medication 12.5 MILLIGRAM(S): at 17:05

## 2024-01-01 RX ADMIN — POVIDONE, POLYVINYL ALCOHOL 1 DROP(S): 20; 27 SOLUTION OPHTHALMIC at 00:54

## 2024-01-01 RX ADMIN — ACETAMINOPHEN 500MG 650 MILLIGRAM(S): 500 TABLET, COATED ORAL at 07:12

## 2024-01-01 RX ADMIN — HYDRALAZINE HYDROCHLORIDE 10 MILLIGRAM(S): 10 TABLET ORAL at 13:41

## 2024-01-01 RX ADMIN — Medication 7 UNIT(S): at 05:40

## 2024-01-01 RX ADMIN — ESCITALOPRAM OXALATE 10 MILLIGRAM(S): 10 TABLET, FILM COATED ORAL at 17:27

## 2024-01-01 RX ADMIN — PREDNISONE 5 MILLIGRAM(S): 20 TABLET ORAL at 09:22

## 2024-01-01 RX ADMIN — Medication 500 MILLIGRAM(S): at 12:05

## 2024-01-01 RX ADMIN — CHLORHEXIDINE GLUCONATE 1 APPLICATION(S): 1.2 RINSE ORAL at 23:07

## 2024-01-01 RX ADMIN — POTASSIUM CHLORIDE 40 MILLIEQUIVALENT(S): 600 TABLET, EXTENDED RELEASE ORAL at 09:36

## 2024-01-01 RX ADMIN — POVIDONE, POLYVINYL ALCOHOL 1 DROP(S): 20; 27 SOLUTION OPHTHALMIC at 05:17

## 2024-01-01 RX ADMIN — Medication 26 UNIT(S): at 05:26

## 2024-01-01 RX ADMIN — Medication 12.5 GRAM(S): at 16:55

## 2024-01-01 RX ADMIN — Medication 1 APPLICATION(S): at 23:28

## 2024-01-01 RX ADMIN — Medication 12.5 MILLIGRAM(S): at 14:55

## 2024-01-01 RX ADMIN — PANTOPRAZOLE SODIUM 40 MILLIGRAM(S): 40 TABLET, DELAYED RELEASE ORAL at 17:21

## 2024-01-01 RX ADMIN — POVIDONE, POLYVINYL ALCOHOL 1 DROP(S): 20; 27 SOLUTION OPHTHALMIC at 05:07

## 2024-01-01 RX ADMIN — ACETAMINOPHEN 500MG 650 MILLIGRAM(S): 500 TABLET, COATED ORAL at 22:05

## 2024-01-01 RX ADMIN — NYSTATIN 1 APPLICATION(S): 100000 POWDER TOPICAL at 21:48

## 2024-01-01 RX ADMIN — Medication 125 MILLIGRAM(S): at 05:47

## 2024-01-01 RX ADMIN — PREDNISOLONE ACETATE 1 DROP(S): 1.2 SUSPENSION/ DROPS OPHTHALMIC at 18:04

## 2024-01-01 RX ADMIN — FENTANYL 1 PATCH: 12 PATCH, EXTENDED RELEASE TRANSDERMAL at 21:39

## 2024-01-01 RX ADMIN — SODIUM CHLORIDE 500 MILLILITER(S): 9 INJECTION, SOLUTION INTRAMUSCULAR; INTRAVENOUS; SUBCUTANEOUS at 15:09

## 2024-01-01 RX ADMIN — CHLORHEXIDINE GLUCONATE 1 APPLICATION(S): 1.2 RINSE ORAL at 22:28

## 2024-01-01 RX ADMIN — ESCITALOPRAM OXALATE 10 MILLIGRAM(S): 10 TABLET, FILM COATED ORAL at 17:05

## 2024-01-01 RX ADMIN — GABAPENTIN 250 MILLIGRAM(S): 300 CAPSULE ORAL at 20:21

## 2024-01-01 RX ADMIN — SODIUM CHLORIDE 1 GRAM(S): 9 INJECTION, SOLUTION INTRAMUSCULAR; INTRAVENOUS; SUBCUTANEOUS at 22:00

## 2024-01-01 RX ADMIN — DICLOFENAC SODIUM 2 GRAM(S): 20 SOLUTION TOPICAL at 22:28

## 2024-01-01 RX ADMIN — INSULIN GLARGINE 10 UNIT(S): 100 INJECTION, SOLUTION SUBCUTANEOUS at 05:51

## 2024-01-01 RX ADMIN — TRIAMCINOLONE ACETONIDE 1 APPLICATION(S): 0.15 AEROSOL, SPRAY TOPICAL at 22:57

## 2024-01-01 RX ADMIN — Medication 5 MILLILITER(S): at 23:57

## 2024-01-01 RX ADMIN — DIPHENHYDRAMINE HYDROCHLORIDE AND LIDOCAINE HYDROCHLORIDE AND ALUMINUM HYDROXIDE AND MAGNESIUM HYDRO 5 MILLILITER(S): KIT at 22:03

## 2024-01-01 RX ADMIN — LIDOCAINE 4 PATCH: 40 CREAM TOPICAL at 05:37

## 2024-01-01 RX ADMIN — OXYCODONE HYDROCHLORIDE 2.5 MILLIGRAM(S): 30 TABLET ORAL at 20:38

## 2024-01-01 RX ADMIN — Medication 160 MILLIGRAM(S): at 12:07

## 2024-01-01 RX ADMIN — ACETAMINOPHEN 500MG 650 MILLIGRAM(S): 500 TABLET, COATED ORAL at 13:43

## 2024-01-01 RX ADMIN — ACETAMINOPHEN, DIPHENHYDRAMINE HCL, PHENYLEPHRINE HCL 12 MILLIGRAM(S): 325; 25; 5 TABLET ORAL at 22:26

## 2024-01-01 RX ADMIN — PREDNISOLONE ACETATE 1 DROP(S): 1.2 SUSPENSION/ DROPS OPHTHALMIC at 00:00

## 2024-01-01 RX ADMIN — SODIUM BICARBONATE 50 MILLIEQUIVALENT(S): 84 INJECTION, SOLUTION INTRAVENOUS at 15:45

## 2024-01-01 RX ADMIN — ACETAMINOPHEN 500MG 400 MILLIGRAM(S): 500 TABLET, COATED ORAL at 11:58

## 2024-01-01 RX ADMIN — POTASSIUM CHLORIDE 20 MILLIEQUIVALENT(S): 600 TABLET, EXTENDED RELEASE ORAL at 05:16

## 2024-01-01 RX ADMIN — FENTANYL 1 PATCH: 12 PATCH, EXTENDED RELEASE TRANSDERMAL at 07:48

## 2024-01-01 RX ADMIN — PREDNISOLONE ACETATE 1 DROP(S): 1.2 SUSPENSION/ DROPS OPHTHALMIC at 23:54

## 2024-01-01 RX ADMIN — Medication 10 UNIT(S): at 18:49

## 2024-01-01 RX ADMIN — GABAPENTIN 250 MILLIGRAM(S): 300 CAPSULE ORAL at 21:50

## 2024-01-01 RX ADMIN — CHLORHEXIDINE GLUCONATE 1 APPLICATION(S): 1.2 RINSE ORAL at 21:16

## 2024-01-01 RX ADMIN — CHLORHEXIDINE GLUCONATE 15 MILLILITER(S): 1.2 RINSE ORAL at 05:00

## 2024-01-01 RX ADMIN — POVIDONE, POLYVINYL ALCOHOL 1 DROP(S): 20; 27 SOLUTION OPHTHALMIC at 17:36

## 2024-01-01 RX ADMIN — DICLOFENAC SODIUM 2 GRAM(S): 20 SOLUTION TOPICAL at 12:27

## 2024-01-01 RX ADMIN — Medication 1 APPLICATION(S): at 23:52

## 2024-01-01 RX ADMIN — CHLORHEXIDINE GLUCONATE 15 MILLILITER(S): 1.2 RINSE ORAL at 06:27

## 2024-01-01 RX ADMIN — Medication 1 APPLICATION(S): at 22:30

## 2024-01-01 RX ADMIN — Medication 1 TABLET(S): at 10:22

## 2024-01-01 RX ADMIN — PANTOPRAZOLE SODIUM 40 MILLIGRAM(S): 40 TABLET, DELAYED RELEASE ORAL at 05:21

## 2024-01-01 RX ADMIN — DICLOFENAC SODIUM 2 GRAM(S): 20 SOLUTION TOPICAL at 11:29

## 2024-01-01 RX ADMIN — Medication 25 MILLIGRAM(S): at 22:25

## 2024-01-01 RX ADMIN — Medication 500 MILLIGRAM(S): at 12:57

## 2024-01-01 RX ADMIN — PREDNISOLONE ACETATE 1 DROP(S): 1.2 SUSPENSION/ DROPS OPHTHALMIC at 11:18

## 2024-01-01 RX ADMIN — DICLOFENAC SODIUM 2 GRAM(S): 20 SOLUTION TOPICAL at 18:09

## 2024-01-01 RX ADMIN — ACETAMINOPHEN 500MG 500 MILLIGRAM(S): 500 TABLET, COATED ORAL at 23:23

## 2024-01-01 RX ADMIN — TRIAMCINOLONE ACETONIDE 1 APPLICATION(S): 0.15 AEROSOL, SPRAY TOPICAL at 21:29

## 2024-01-01 RX ADMIN — AZITHROMYCIN 255 MILLIGRAM(S): 250 TABLET, FILM COATED ORAL at 15:10

## 2024-01-01 RX ADMIN — FENTANYL 1 PATCH: 12 PATCH, EXTENDED RELEASE TRANSDERMAL at 12:16

## 2024-01-01 RX ADMIN — Medication 250 MILLIGRAM(S): at 06:36

## 2024-01-01 RX ADMIN — NYSTATIN 1 APPLICATION(S): 100000 POWDER TOPICAL at 23:05

## 2024-01-01 RX ADMIN — Medication 1 TABLET(S): at 10:35

## 2024-01-01 RX ADMIN — POVIDONE, POLYVINYL ALCOHOL 1 DROP(S): 20; 27 SOLUTION OPHTHALMIC at 06:06

## 2024-01-01 RX ADMIN — Medication 300 MILLIGRAM(S): at 11:17

## 2024-01-01 RX ADMIN — POVIDONE, POLYVINYL ALCOHOL 1 DROP(S): 20; 27 SOLUTION OPHTHALMIC at 17:18

## 2024-01-01 RX ADMIN — Medication 3: at 17:58

## 2024-01-01 RX ADMIN — PREDNISOLONE ACETATE 1 DROP(S): 1.2 SUSPENSION/ DROPS OPHTHALMIC at 00:09

## 2024-01-01 RX ADMIN — Medication 5 MILLILITER(S): at 00:44

## 2024-01-01 RX ADMIN — MEROPENEM 100 MILLIGRAM(S): 500 INJECTION, POWDER, FOR SOLUTION INTRAVENOUS at 05:25

## 2024-01-01 RX ADMIN — DIPHENHYDRAMINE HYDROCHLORIDE AND LIDOCAINE HYDROCHLORIDE AND ALUMINUM HYDROXIDE AND MAGNESIUM HYDRO 5 MILLILITER(S): KIT at 06:46

## 2024-01-01 RX ADMIN — FENTANYL 1 PATCH: 12 PATCH, EXTENDED RELEASE TRANSDERMAL at 10:33

## 2024-01-01 RX ADMIN — POVIDONE, POLYVINYL ALCOHOL 1 DROP(S): 20; 27 SOLUTION OPHTHALMIC at 23:05

## 2024-01-01 RX ADMIN — Medication 13 UNIT(S): at 18:00

## 2024-01-01 RX ADMIN — GABAPENTIN 250 MILLIGRAM(S): 300 CAPSULE ORAL at 21:32

## 2024-01-01 RX ADMIN — POVIDONE, POLYVINYL ALCOHOL 1 DROP(S): 20; 27 SOLUTION OPHTHALMIC at 12:21

## 2024-01-01 RX ADMIN — WITCH HAZEL 1 APPLICATION(S): 50 SOLUTION RECTAL at 06:09

## 2024-01-01 RX ADMIN — Medication 1 SPRAY(S): at 11:18

## 2024-01-01 RX ADMIN — IPRATROPIUM BROMIDE AND ALBUTEROL SULFATE 3 MILLILITER(S): 2.5; .5 SOLUTION RESPIRATORY (INHALATION) at 05:51

## 2024-01-01 RX ADMIN — PREDNISONE 5 MILLIGRAM(S): 20 TABLET ORAL at 09:26

## 2024-01-01 RX ADMIN — Medication 1 SPRAY(S): at 18:31

## 2024-01-01 RX ADMIN — ACETAMINOPHEN 500MG 1000 MILLIGRAM(S): 500 TABLET, COATED ORAL at 21:48

## 2024-01-01 RX ADMIN — DICLOFENAC SODIUM 2 GRAM(S): 20 SOLUTION TOPICAL at 18:03

## 2024-01-01 RX ADMIN — Medication 25 GRAM(S): at 11:30

## 2024-01-01 RX ADMIN — CEFEPIME 100 MILLIGRAM(S): 2 INJECTION, POWDER, FOR SOLUTION INTRAVENOUS at 22:42

## 2024-01-01 RX ADMIN — ACETAMINOPHEN 500MG 650 MILLIGRAM(S): 500 TABLET, COATED ORAL at 13:55

## 2024-01-01 RX ADMIN — Medication 1 SPRAY(S): at 17:05

## 2024-01-01 RX ADMIN — Medication 6: at 05:56

## 2024-01-01 RX ADMIN — Medication 1 SPRAY(S): at 23:04

## 2024-01-01 RX ADMIN — Medication 12.5 MILLIGRAM(S): at 15:38

## 2024-01-01 RX ADMIN — ACETAMINOPHEN, DIPHENHYDRAMINE HCL, PHENYLEPHRINE HCL 12 MILLIGRAM(S): 325; 25; 5 TABLET ORAL at 22:06

## 2024-01-01 RX ADMIN — NYSTATIN 1 APPLICATION(S): 100000 POWDER TOPICAL at 22:27

## 2024-01-01 RX ADMIN — INSULIN GLARGINE 5 UNIT(S): 100 INJECTION, SOLUTION SUBCUTANEOUS at 05:56

## 2024-01-01 RX ADMIN — IPRATROPIUM BROMIDE AND ALBUTEROL SULFATE 3 MILLILITER(S): 2.5; .5 SOLUTION RESPIRATORY (INHALATION) at 05:54

## 2024-01-01 RX ADMIN — ESCITALOPRAM OXALATE 10 MILLIGRAM(S): 10 TABLET, FILM COATED ORAL at 18:25

## 2024-01-01 RX ADMIN — POVIDONE, POLYVINYL ALCOHOL 1 DROP(S): 20; 27 SOLUTION OPHTHALMIC at 23:53

## 2024-01-01 RX ADMIN — Medication 1 PACKET(S): at 10:59

## 2024-01-01 RX ADMIN — ACETAMINOPHEN 500MG 500 MILLIGRAM(S): 500 TABLET, COATED ORAL at 12:37

## 2024-01-01 RX ADMIN — CEFEPIME 100 MILLIGRAM(S): 2 INJECTION, POWDER, FOR SOLUTION INTRAVENOUS at 06:34

## 2024-01-01 RX ADMIN — CHLORHEXIDINE GLUCONATE 1 APPLICATION(S): 1.2 RINSE ORAL at 21:41

## 2024-01-01 RX ADMIN — Medication 2: at 12:13

## 2024-01-01 RX ADMIN — OXYCODONE HYDROCHLORIDE 2.5 MILLIGRAM(S): 30 TABLET ORAL at 11:32

## 2024-01-01 RX ADMIN — Medication 5 MILLILITER(S): at 12:05

## 2024-01-01 RX ADMIN — Medication 5 MILLILITER(S): at 12:18

## 2024-01-01 RX ADMIN — IPRATROPIUM BROMIDE AND ALBUTEROL SULFATE 3 MILLILITER(S): 2.5; .5 SOLUTION RESPIRATORY (INHALATION) at 17:51

## 2024-01-01 RX ADMIN — POVIDONE, POLYVINYL ALCOHOL 1 DROP(S): 20; 27 SOLUTION OPHTHALMIC at 22:06

## 2024-01-01 RX ADMIN — Medication 5 MILLILITER(S): at 23:54

## 2024-01-01 RX ADMIN — FENTANYL 1 PATCH: 12 PATCH, EXTENDED RELEASE TRANSDERMAL at 13:33

## 2024-01-01 RX ADMIN — FENTANYL 1 PATCH: 12 PATCH, EXTENDED RELEASE TRANSDERMAL at 07:21

## 2024-01-01 RX ADMIN — POVIDONE, POLYVINYL ALCOHOL 1 DROP(S): 20; 27 SOLUTION OPHTHALMIC at 05:23

## 2024-01-01 RX ADMIN — Medication 10: at 12:15

## 2024-01-01 RX ADMIN — Medication 13 UNIT(S): at 18:22

## 2024-01-01 RX ADMIN — Medication 50 MILLILITER(S): at 21:21

## 2024-01-01 RX ADMIN — Medication 4: at 18:11

## 2024-01-01 RX ADMIN — IPRATROPIUM BROMIDE AND ALBUTEROL SULFATE 3 MILLILITER(S): 2.5; .5 SOLUTION RESPIRATORY (INHALATION) at 11:43

## 2024-01-01 RX ADMIN — DIPHENHYDRAMINE HYDROCHLORIDE AND LIDOCAINE HYDROCHLORIDE AND ALUMINUM HYDROXIDE AND MAGNESIUM HYDRO 5 MILLILITER(S): KIT at 21:19

## 2024-01-01 RX ADMIN — Medication 2: at 12:06

## 2024-01-01 RX ADMIN — INSULIN GLARGINE 19 UNIT(S): 100 INJECTION, SOLUTION SUBCUTANEOUS at 08:27

## 2024-01-01 RX ADMIN — Medication 3 MILLILITER(S): at 17:13

## 2024-01-01 RX ADMIN — Medication 1: at 17:26

## 2024-01-01 RX ADMIN — GABAPENTIN 250 MILLIGRAM(S): 300 CAPSULE ORAL at 22:02

## 2024-01-01 RX ADMIN — Medication 87.5 MICROGRAM(S): at 05:38

## 2024-01-01 RX ADMIN — ACETAMINOPHEN 500MG 650 MILLIGRAM(S): 500 TABLET, COATED ORAL at 05:57

## 2024-01-01 RX ADMIN — POVIDONE, POLYVINYL ALCOHOL 1 DROP(S): 20; 27 SOLUTION OPHTHALMIC at 23:48

## 2024-01-01 RX ADMIN — Medication 1 TABLET(S): at 12:06

## 2024-01-01 RX ADMIN — PREDNISOLONE ACETATE 1 DROP(S): 1.2 SUSPENSION/ DROPS OPHTHALMIC at 17:07

## 2024-01-01 RX ADMIN — IPRATROPIUM BROMIDE AND ALBUTEROL SULFATE 3 MILLILITER(S): 2.5; .5 SOLUTION RESPIRATORY (INHALATION) at 05:56

## 2024-01-01 RX ADMIN — DICLOFENAC SODIUM 2 GRAM(S): 20 SOLUTION TOPICAL at 11:46

## 2024-01-01 RX ADMIN — Medication 250 MILLIGRAM(S): at 22:23

## 2024-01-01 RX ADMIN — NYSTATIN 1 APPLICATION(S): 100000 POWDER TOPICAL at 05:35

## 2024-01-01 RX ADMIN — CHLORHEXIDINE GLUCONATE 15 MILLILITER(S): 1.2 RINSE ORAL at 05:52

## 2024-01-01 RX ADMIN — Medication 500 MILLIGRAM(S): at 12:14

## 2024-01-01 RX ADMIN — INSULIN GLARGINE 14 UNIT(S): 100 INJECTION, SOLUTION SUBCUTANEOUS at 06:20

## 2024-01-01 RX ADMIN — Medication 250 MILLIGRAM(S): at 22:42

## 2024-01-01 RX ADMIN — Medication 12.5 MILLIGRAM(S): at 15:17

## 2024-01-01 RX ADMIN — OXYCODONE HYDROCHLORIDE 2.5 MILLIGRAM(S): 30 TABLET ORAL at 10:03

## 2024-01-01 RX ADMIN — Medication 125 MICROGRAM(S): at 04:47

## 2024-01-01 RX ADMIN — Medication 12.5 MILLIGRAM(S): at 13:21

## 2024-01-01 RX ADMIN — PREDNISOLONE ACETATE 1 DROP(S): 1.2 SUSPENSION/ DROPS OPHTHALMIC at 23:52

## 2024-01-01 RX ADMIN — NYSTATIN 1 APPLICATION(S): 100000 POWDER TOPICAL at 15:12

## 2024-01-01 RX ADMIN — LIDOCAINE 4 PATCH: 40 CREAM TOPICAL at 21:29

## 2024-01-01 RX ADMIN — FENTANYL 1 PATCH: 12 PATCH, EXTENDED RELEASE TRANSDERMAL at 19:41

## 2024-01-01 RX ADMIN — IPRATROPIUM BROMIDE AND ALBUTEROL SULFATE 3 MILLILITER(S): 2.5; .5 SOLUTION RESPIRATORY (INHALATION) at 18:20

## 2024-01-01 RX ADMIN — ACETAMINOPHEN 500MG 1000 MILLIGRAM(S): 500 TABLET, COATED ORAL at 15:13

## 2024-01-01 RX ADMIN — PREDNISOLONE ACETATE 1 DROP(S): 1.2 SUSPENSION/ DROPS OPHTHALMIC at 06:10

## 2024-01-01 RX ADMIN — WITCH HAZEL 1 APPLICATION(S): 50 SOLUTION RECTAL at 17:29

## 2024-01-01 RX ADMIN — HYDRALAZINE HYDROCHLORIDE 10 MILLIGRAM(S): 10 TABLET ORAL at 05:20

## 2024-01-01 RX ADMIN — Medication 50 MILLILITER(S): at 21:16

## 2024-01-01 RX ADMIN — DICLOFENAC SODIUM 2 GRAM(S): 20 SOLUTION TOPICAL at 05:03

## 2024-01-01 RX ADMIN — IPRATROPIUM BROMIDE AND ALBUTEROL SULFATE 3 MILLILITER(S): 2.5; .5 SOLUTION RESPIRATORY (INHALATION) at 05:33

## 2024-01-01 RX ADMIN — Medication 20 MILLIGRAM(S): at 10:35

## 2024-01-01 RX ADMIN — POVIDONE, POLYVINYL ALCOHOL 1 DROP(S): 20; 27 SOLUTION OPHTHALMIC at 12:06

## 2024-01-01 RX ADMIN — TRIAMCINOLONE ACETONIDE 1 APPLICATION(S): 0.15 AEROSOL, SPRAY TOPICAL at 06:11

## 2024-01-01 RX ADMIN — WITCH HAZEL 1 APPLICATION(S): 50 SOLUTION RECTAL at 06:23

## 2024-01-01 RX ADMIN — OXYCODONE HYDROCHLORIDE 2.5 MILLIGRAM(S): 30 TABLET ORAL at 20:45

## 2024-01-01 RX ADMIN — POVIDONE, POLYVINYL ALCOHOL 1 DROP(S): 20; 27 SOLUTION OPHTHALMIC at 00:06

## 2024-01-01 RX ADMIN — ACETAMINOPHEN, DIPHENHYDRAMINE HCL, PHENYLEPHRINE HCL 12 MILLIGRAM(S): 325; 25; 5 TABLET ORAL at 22:29

## 2024-01-01 RX ADMIN — FENTANYL 1 PATCH: 12 PATCH, EXTENDED RELEASE TRANSDERMAL at 19:10

## 2024-01-01 RX ADMIN — PREDNISOLONE ACETATE 1 DROP(S): 1.2 SUSPENSION/ DROPS OPHTHALMIC at 23:56

## 2024-01-01 RX ADMIN — Medication 3 UNIT(S): at 10:29

## 2024-01-01 RX ADMIN — Medication 5 MILLILITER(S): at 18:04

## 2024-01-01 RX ADMIN — Medication 5 MILLILITER(S): at 05:25

## 2024-01-01 RX ADMIN — CHLORHEXIDINE GLUCONATE 15 MILLILITER(S): 1.2 RINSE ORAL at 17:09

## 2024-01-01 RX ADMIN — Medication 5 MILLILITER(S): at 17:26

## 2024-01-01 RX ADMIN — ESCITALOPRAM OXALATE 10 MILLIGRAM(S): 10 TABLET, FILM COATED ORAL at 18:40

## 2024-01-01 RX ADMIN — PREDNISOLONE ACETATE 1 DROP(S): 1.2 SUSPENSION/ DROPS OPHTHALMIC at 06:37

## 2024-01-01 RX ADMIN — LIDOCAINE 1 PATCH: 40 CREAM TOPICAL at 21:54

## 2024-01-01 RX ADMIN — FUROSEMIDE 20 MILLIGRAM(S): 40 TABLET ORAL at 15:59

## 2024-01-01 RX ADMIN — SODIUM CHLORIDE 1 GRAM(S): 9 INJECTION, SOLUTION INTRAMUSCULAR; INTRAVENOUS; SUBCUTANEOUS at 15:12

## 2024-01-01 RX ADMIN — POVIDONE, POLYVINYL ALCOHOL 1 DROP(S): 20; 27 SOLUTION OPHTHALMIC at 05:39

## 2024-01-01 RX ADMIN — LIDOCAINE 4 PATCH: 40 CREAM TOPICAL at 19:48

## 2024-01-01 RX ADMIN — LIDOCAINE 2 PATCH: 40 CREAM TOPICAL at 18:16

## 2024-01-01 RX ADMIN — Medication 25 MILLIGRAM(S): at 05:42

## 2024-01-01 RX ADMIN — Medication 250 MILLIGRAM(S): at 08:12

## 2024-01-01 RX ADMIN — Medication 12.5 MILLIGRAM(S): at 15:04

## 2024-01-01 RX ADMIN — Medication 4: at 14:25

## 2024-01-01 RX ADMIN — LIDOCAINE 1 PATCH: 40 CREAM TOPICAL at 06:55

## 2024-01-01 RX ADMIN — Medication 5 MILLILITER(S): at 17:48

## 2024-01-01 RX ADMIN — Medication 2: at 05:52

## 2024-01-01 RX ADMIN — Medication 2: at 23:43

## 2024-01-01 RX ADMIN — Medication 5 MILLILITER(S): at 12:17

## 2024-01-01 RX ADMIN — GABAPENTIN 250 MILLIGRAM(S): 300 CAPSULE ORAL at 22:03

## 2024-01-01 RX ADMIN — INSULIN GLARGINE 14 UNIT(S): 100 INJECTION, SOLUTION SUBCUTANEOUS at 06:42

## 2024-01-01 RX ADMIN — PREDNISOLONE ACETATE 1 DROP(S): 1.2 SUSPENSION/ DROPS OPHTHALMIC at 17:10

## 2024-01-01 RX ADMIN — LIDOCAINE 4 PATCH: 40 CREAM TOPICAL at 21:46

## 2024-01-01 RX ADMIN — LIDOCAINE 4 PATCH: 40 CREAM TOPICAL at 05:30

## 2024-01-01 RX ADMIN — Medication 5 MILLILITER(S): at 00:08

## 2024-01-01 RX ADMIN — PANTOPRAZOLE SODIUM 40 MILLIGRAM(S): 40 TABLET, DELAYED RELEASE ORAL at 18:53

## 2024-01-01 RX ADMIN — Medication 5 MILLILITER(S): at 00:02

## 2024-01-01 RX ADMIN — FENTANYL 1 PATCH: 12 PATCH, EXTENDED RELEASE TRANSDERMAL at 08:38

## 2024-01-01 RX ADMIN — SODIUM CHLORIDE 1 GRAM(S): 9 INJECTION, SOLUTION INTRAMUSCULAR; INTRAVENOUS; SUBCUTANEOUS at 14:10

## 2024-01-01 RX ADMIN — FUROSEMIDE 20 MILLIGRAM(S): 40 TABLET ORAL at 11:45

## 2024-01-01 RX ADMIN — Medication 1 SPRAY(S): at 17:36

## 2024-01-01 RX ADMIN — DICLOFENAC SODIUM 2 GRAM(S): 20 SOLUTION TOPICAL at 13:07

## 2024-01-01 RX ADMIN — ACETAMINOPHEN 500MG 650 MILLIGRAM(S): 500 TABLET, COATED ORAL at 06:00

## 2024-01-01 RX ADMIN — NYSTATIN 1 APPLICATION(S): 100000 POWDER TOPICAL at 13:12

## 2024-01-01 RX ADMIN — INSULIN GLARGINE 12 UNIT(S): 100 INJECTION, SOLUTION SUBCUTANEOUS at 06:02

## 2024-01-01 RX ADMIN — LIDOCAINE 1 PATCH: 40 CREAM TOPICAL at 06:44

## 2024-01-01 RX ADMIN — POVIDONE, POLYVINYL ALCOHOL 1 DROP(S): 20; 27 SOLUTION OPHTHALMIC at 11:27

## 2024-01-01 RX ADMIN — POVIDONE, POLYVINYL ALCOHOL 1 DROP(S): 20; 27 SOLUTION OPHTHALMIC at 17:08

## 2024-01-01 RX ADMIN — Medication 1: at 12:15

## 2024-01-01 RX ADMIN — DICLOFENAC SODIUM 2 GRAM(S): 20 SOLUTION TOPICAL at 00:19

## 2024-01-01 RX ADMIN — LIDOCAINE 1 PATCH: 40 CREAM TOPICAL at 05:43

## 2024-01-01 RX ADMIN — PREDNISOLONE ACETATE 1 DROP(S): 1.2 SUSPENSION/ DROPS OPHTHALMIC at 05:40

## 2024-01-01 RX ADMIN — Medication 70 MILLILITER(S): at 00:51

## 2024-01-01 RX ADMIN — Medication 5 MILLILITER(S): at 12:19

## 2024-01-01 RX ADMIN — NYSTATIN 1 APPLICATION(S): 100000 POWDER TOPICAL at 21:26

## 2024-01-01 RX ADMIN — PREDNISONE 5 MILLIGRAM(S): 20 TABLET ORAL at 05:52

## 2024-01-01 RX ADMIN — LIDOCAINE 4 PATCH: 40 CREAM TOPICAL at 09:33

## 2024-01-01 RX ADMIN — ACETAMINOPHEN 500MG 650 MILLIGRAM(S): 500 TABLET, COATED ORAL at 05:37

## 2024-01-01 RX ADMIN — Medication 125 MILLIGRAM(S): at 21:58

## 2024-01-01 RX ADMIN — Medication 1: at 12:34

## 2024-01-01 RX ADMIN — MIDODRINE HYDROCHLORIDE 15 MILLIGRAM(S): 5 TABLET ORAL at 11:52

## 2024-01-01 RX ADMIN — LIDOCAINE 4 PATCH: 40 CREAM TOPICAL at 16:26

## 2024-01-01 RX ADMIN — Medication 300 MILLIGRAM(S): at 06:08

## 2024-01-01 RX ADMIN — OXYCODONE HYDROCHLORIDE 5 MILLIGRAM(S): 30 TABLET ORAL at 12:03

## 2024-01-01 RX ADMIN — HYDROMORPHONE HYDROCHLORIDE 0.5 MILLIGRAM(S): 2 TABLET ORAL at 18:53

## 2024-01-01 RX ADMIN — FENTANYL 1 PATCH: 12 PATCH, EXTENDED RELEASE TRANSDERMAL at 19:37

## 2024-01-01 RX ADMIN — Medication 160 MILLIGRAM(S): at 12:01

## 2024-01-01 RX ADMIN — Medication 8: at 12:22

## 2024-01-01 RX ADMIN — CEFEPIME 100 MILLIGRAM(S): 2 INJECTION, POWDER, FOR SOLUTION INTRAVENOUS at 05:40

## 2024-01-01 RX ADMIN — POVIDONE, POLYVINYL ALCOHOL 1 DROP(S): 20; 27 SOLUTION OPHTHALMIC at 12:11

## 2024-01-01 RX ADMIN — GABAPENTIN 250 MILLIGRAM(S): 300 CAPSULE ORAL at 22:26

## 2024-01-01 RX ADMIN — LIDOCAINE 4 PATCH: 40 CREAM TOPICAL at 20:29

## 2024-01-01 RX ADMIN — CHLORHEXIDINE GLUCONATE 15 MILLILITER(S): 1.2 RINSE ORAL at 04:38

## 2024-01-01 RX ADMIN — GABAPENTIN 250 MILLIGRAM(S): 300 CAPSULE ORAL at 21:21

## 2024-01-01 RX ADMIN — DICLOFENAC SODIUM 2 GRAM(S): 20 SOLUTION TOPICAL at 18:08

## 2024-01-01 RX ADMIN — Medication 0: at 12:50

## 2024-01-01 RX ADMIN — NYSTATIN 1 APPLICATION(S): 100000 POWDER TOPICAL at 05:58

## 2024-01-01 RX ADMIN — PREDNISOLONE ACETATE 1 DROP(S): 1.2 SUSPENSION/ DROPS OPHTHALMIC at 05:48

## 2024-01-01 RX ADMIN — DICLOFENAC SODIUM 2 GRAM(S): 20 SOLUTION TOPICAL at 17:36

## 2024-01-01 RX ADMIN — Medication 12.5 GRAM(S): at 00:35

## 2024-01-01 RX ADMIN — Medication 5 MILLILITER(S): at 11:51

## 2024-01-01 RX ADMIN — Medication 1 SPRAY(S): at 17:58

## 2024-01-01 RX ADMIN — SODIUM CHLORIDE 1 GRAM(S): 9 INJECTION, SOLUTION INTRAMUSCULAR; INTRAVENOUS; SUBCUTANEOUS at 17:58

## 2024-01-01 RX ADMIN — IPRATROPIUM BROMIDE AND ALBUTEROL SULFATE 3 MILLILITER(S): 2.5; .5 SOLUTION RESPIRATORY (INHALATION) at 05:42

## 2024-01-01 RX ADMIN — Medication 5 MILLILITER(S): at 17:22

## 2024-01-01 RX ADMIN — TRIAMCINOLONE ACETONIDE 1 APPLICATION(S): 0.15 AEROSOL, SPRAY TOPICAL at 21:33

## 2024-01-01 RX ADMIN — Medication 1 SPRAY(S): at 05:02

## 2024-01-01 RX ADMIN — WITCH HAZEL 1 APPLICATION(S): 50 SOLUTION RECTAL at 18:54

## 2024-01-01 RX ADMIN — Medication 5 MILLILITER(S): at 13:10

## 2024-01-01 RX ADMIN — ACETAMINOPHEN, DIPHENHYDRAMINE HCL, PHENYLEPHRINE HCL 12 MILLIGRAM(S): 325; 25; 5 TABLET ORAL at 22:30

## 2024-01-01 RX ADMIN — ACETAMINOPHEN 500MG 1000 MILLIGRAM(S): 500 TABLET, COATED ORAL at 22:06

## 2024-01-01 RX ADMIN — Medication 1 TABLET(S): at 11:26

## 2024-01-01 RX ADMIN — ACETAMINOPHEN, DIPHENHYDRAMINE HCL, PHENYLEPHRINE HCL 12 MILLIGRAM(S): 325; 25; 5 TABLET ORAL at 21:26

## 2024-01-01 RX ADMIN — SODIUM CHLORIDE 1 GRAM(S): 9 INJECTION, SOLUTION INTRAMUSCULAR; INTRAVENOUS; SUBCUTANEOUS at 06:07

## 2024-01-01 RX ADMIN — POVIDONE, POLYVINYL ALCOHOL 1 DROP(S): 20; 27 SOLUTION OPHTHALMIC at 00:44

## 2024-01-01 RX ADMIN — Medication 25 UNIT(S): at 12:25

## 2024-01-01 RX ADMIN — LIDOCAINE 4 PATCH: 40 CREAM TOPICAL at 04:39

## 2024-01-01 RX ADMIN — Medication 2 MILLIGRAM(S): at 00:23

## 2024-01-01 RX ADMIN — OXYCODONE HYDROCHLORIDE 5 MILLIGRAM(S): 30 TABLET ORAL at 14:02

## 2024-01-01 RX ADMIN — Medication 1 TABLET(S): at 14:51

## 2024-01-01 RX ADMIN — SODIUM CHLORIDE 1 GRAM(S): 9 INJECTION, SOLUTION INTRAMUSCULAR; INTRAVENOUS; SUBCUTANEOUS at 17:36

## 2024-01-01 RX ADMIN — Medication 12.5 MILLIGRAM(S): at 22:26

## 2024-01-01 RX ADMIN — ACETAMINOPHEN 500MG 1000 MILLIGRAM(S): 500 TABLET, COATED ORAL at 06:05

## 2024-01-01 RX ADMIN — TRIAMCINOLONE ACETONIDE 1 APPLICATION(S): 0.15 AEROSOL, SPRAY TOPICAL at 22:02

## 2024-01-01 RX ADMIN — HYDRALAZINE HYDROCHLORIDE 10 MILLIGRAM(S): 10 TABLET ORAL at 11:34

## 2024-01-01 RX ADMIN — CHLORHEXIDINE GLUCONATE 15 MILLILITER(S): 1.2 RINSE ORAL at 19:14

## 2024-01-01 RX ADMIN — SODIUM CHLORIDE 1 GRAM(S): 9 INJECTION, SOLUTION INTRAMUSCULAR; INTRAVENOUS; SUBCUTANEOUS at 13:15

## 2024-01-01 RX ADMIN — DICLOFENAC SODIUM 2 GRAM(S): 20 SOLUTION TOPICAL at 11:59

## 2024-01-01 RX ADMIN — Medication 250 MILLIGRAM(S): at 05:16

## 2024-01-01 RX ADMIN — FENTANYL 1 PATCH: 12 PATCH, EXTENDED RELEASE TRANSDERMAL at 09:03

## 2024-01-01 RX ADMIN — IPRATROPIUM BROMIDE AND ALBUTEROL SULFATE 3 MILLILITER(S): 2.5; .5 SOLUTION RESPIRATORY (INHALATION) at 06:10

## 2024-01-01 RX ADMIN — LIDOCAINE 1 PATCH: 40 CREAM TOPICAL at 06:01

## 2024-01-01 RX ADMIN — Medication 2 PACKET(S): at 23:56

## 2024-01-01 RX ADMIN — CHLORHEXIDINE GLUCONATE 1 APPLICATION(S): 1.2 RINSE ORAL at 22:23

## 2024-01-01 RX ADMIN — DICLOFENAC SODIUM 2 GRAM(S): 20 SOLUTION TOPICAL at 22:30

## 2024-01-01 RX ADMIN — Medication 5 MILLILITER(S): at 11:25

## 2024-01-01 RX ADMIN — DICLOFENAC SODIUM 2 GRAM(S): 20 SOLUTION TOPICAL at 11:20

## 2024-01-01 RX ADMIN — Medication 12.5 GRAM(S): at 00:00

## 2024-01-01 RX ADMIN — Medication 250 MILLIGRAM(S): at 18:03

## 2024-01-01 RX ADMIN — WITCH HAZEL 1 APPLICATION(S): 50 SOLUTION RECTAL at 18:32

## 2024-01-01 RX ADMIN — DIPHENHYDRAMINE HYDROCHLORIDE AND LIDOCAINE HYDROCHLORIDE AND ALUMINUM HYDROXIDE AND MAGNESIUM HYDRO 5 MILLILITER(S): KIT at 13:33

## 2024-01-01 RX ADMIN — Medication 1 SPRAY(S): at 19:14

## 2024-01-01 RX ADMIN — CEFTOLOZANE AND TAZOBACTAM 33.33 MILLIGRAM(S): 1; .5 INJECTION, POWDER, LYOPHILIZED, FOR SOLUTION INTRAVENOUS at 05:34

## 2024-01-01 RX ADMIN — TRIAMCINOLONE ACETONIDE 1 APPLICATION(S): 0.15 AEROSOL, SPRAY TOPICAL at 13:48

## 2024-01-01 RX ADMIN — CHLORHEXIDINE GLUCONATE 15 MILLILITER(S): 1.2 RINSE ORAL at 18:18

## 2024-01-01 RX ADMIN — FENTANYL 1 PATCH: 12 PATCH, EXTENDED RELEASE TRANSDERMAL at 08:12

## 2024-01-01 RX ADMIN — Medication 24 UNIT(S): at 06:51

## 2024-01-01 RX ADMIN — LIDOCAINE 4 PATCH: 40 CREAM TOPICAL at 17:41

## 2024-01-01 RX ADMIN — LIDOCAINE 4 PATCH: 40 CREAM TOPICAL at 16:52

## 2024-01-01 RX ADMIN — Medication 1 SPRAY(S): at 12:38

## 2024-01-01 RX ADMIN — ACETAMINOPHEN, DIPHENHYDRAMINE HCL, PHENYLEPHRINE HCL 12 MILLIGRAM(S): 325; 25; 5 TABLET ORAL at 21:39

## 2024-01-01 RX ADMIN — Medication 6: at 18:09

## 2024-01-01 RX ADMIN — ACETAMINOPHEN 500MG 1000 MILLIGRAM(S): 500 TABLET, COATED ORAL at 22:52

## 2024-01-01 RX ADMIN — NYSTATIN 1 APPLICATION(S): 100000 POWDER TOPICAL at 17:20

## 2024-01-01 RX ADMIN — Medication 5 MILLILITER(S): at 18:12

## 2024-01-01 RX ADMIN — Medication 1 TABLET(S): at 12:38

## 2024-01-01 RX ADMIN — Medication 1: at 07:05

## 2024-01-01 RX ADMIN — Medication 5 MILLILITER(S): at 05:58

## 2024-01-01 RX ADMIN — Medication 50 MILLILITER(S): at 06:37

## 2024-01-01 RX ADMIN — Medication 5 MILLILITER(S): at 17:35

## 2024-01-01 RX ADMIN — CEFEPIME 100 MILLIGRAM(S): 2 INJECTION, POWDER, FOR SOLUTION INTRAVENOUS at 06:09

## 2024-01-01 RX ADMIN — Medication 1 SPRAY(S): at 23:25

## 2024-01-01 RX ADMIN — AMLODIPINE BESYLATE 10 MILLIGRAM(S): 2.5 TABLET ORAL at 05:13

## 2024-01-01 RX ADMIN — HYDRALAZINE HYDROCHLORIDE 10 MILLIGRAM(S): 10 TABLET ORAL at 22:04

## 2024-01-01 RX ADMIN — Medication 12.5 MILLIGRAM(S): at 14:12

## 2024-01-01 RX ADMIN — LIDOCAINE 4 PATCH: 40 CREAM TOPICAL at 17:24

## 2024-01-01 RX ADMIN — DICLOFENAC SODIUM 2 GRAM(S): 20 SOLUTION TOPICAL at 11:38

## 2024-01-01 RX ADMIN — PREDNISONE 5 MILLIGRAM(S): 20 TABLET ORAL at 05:18

## 2024-01-01 RX ADMIN — PREDNISONE 5 MILLIGRAM(S): 20 TABLET ORAL at 12:32

## 2024-01-01 RX ADMIN — POVIDONE, POLYVINYL ALCOHOL 1 DROP(S): 20; 27 SOLUTION OPHTHALMIC at 18:40

## 2024-01-01 RX ADMIN — PREDNISONE 5 MILLIGRAM(S): 20 TABLET ORAL at 10:01

## 2024-01-01 RX ADMIN — ACETAMINOPHEN 500MG 1000 MILLIGRAM(S): 500 TABLET, COATED ORAL at 23:41

## 2024-01-01 RX ADMIN — CHLORHEXIDINE GLUCONATE 15 MILLILITER(S): 1.2 RINSE ORAL at 17:32

## 2024-01-01 RX ADMIN — FENTANYL 1 PATCH: 12 PATCH, EXTENDED RELEASE TRANSDERMAL at 07:16

## 2024-01-01 RX ADMIN — CHLORHEXIDINE GLUCONATE 1 APPLICATION(S): 1.2 RINSE ORAL at 22:07

## 2024-01-01 RX ADMIN — PANTOPRAZOLE SODIUM 40 MILLIGRAM(S): 40 TABLET, DELAYED RELEASE ORAL at 17:11

## 2024-01-01 RX ADMIN — DICLOFENAC SODIUM 2 GRAM(S): 20 SOLUTION TOPICAL at 22:04

## 2024-01-01 RX ADMIN — ACETAMINOPHEN 500MG 1000 MILLIGRAM(S): 500 TABLET, COATED ORAL at 21:57

## 2024-01-01 RX ADMIN — POVIDONE, POLYVINYL ALCOHOL 1 DROP(S): 20; 27 SOLUTION OPHTHALMIC at 11:39

## 2024-01-01 RX ADMIN — SODIUM CHLORIDE 1 GRAM(S): 9 INJECTION, SOLUTION INTRAMUSCULAR; INTRAVENOUS; SUBCUTANEOUS at 21:22

## 2024-01-01 RX ADMIN — SODIUM CHLORIDE 4 MILLILITER(S): 9 INJECTION, SOLUTION INTRAMUSCULAR; INTRAVENOUS; SUBCUTANEOUS at 05:45

## 2024-01-01 RX ADMIN — HYDRALAZINE HYDROCHLORIDE 10 MILLIGRAM(S): 10 TABLET ORAL at 22:08

## 2024-01-01 RX ADMIN — LIDOCAINE 4 PATCH: 40 CREAM TOPICAL at 09:32

## 2024-01-01 RX ADMIN — DICLOFENAC SODIUM 2 GRAM(S): 20 SOLUTION TOPICAL at 11:35

## 2024-01-01 RX ADMIN — POVIDONE, POLYVINYL ALCOHOL 1 DROP(S): 20; 27 SOLUTION OPHTHALMIC at 05:47

## 2024-01-01 RX ADMIN — MEROPENEM 100 MILLIGRAM(S): 500 INJECTION, POWDER, FOR SOLUTION INTRAVENOUS at 14:13

## 2024-01-01 RX ADMIN — LORAZEPAM 0.5 MILLIGRAM(S): 2 TABLET ORAL at 11:27

## 2024-01-01 RX ADMIN — INSULIN GLARGINE 21 UNIT(S): 100 INJECTION, SOLUTION SUBCUTANEOUS at 18:12

## 2024-01-01 RX ADMIN — NYSTATIN 1 APPLICATION(S): 100000 POWDER TOPICAL at 22:02

## 2024-01-01 RX ADMIN — Medication 125 MILLIGRAM(S): at 11:50

## 2024-01-01 RX ADMIN — Medication 500 MILLIGRAM(S): at 11:52

## 2024-01-01 RX ADMIN — OXYCODONE HYDROCHLORIDE 2.5 MILLIGRAM(S): 30 TABLET ORAL at 07:14

## 2024-01-01 RX ADMIN — LIDOCAINE 4 PATCH: 40 CREAM TOPICAL at 18:31

## 2024-01-01 RX ADMIN — Medication 12.5 MILLIGRAM(S): at 14:04

## 2024-01-01 RX ADMIN — Medication 1 APPLICATION(S): at 23:06

## 2024-01-01 RX ADMIN — Medication 5 MILLILITER(S): at 12:39

## 2024-01-01 RX ADMIN — CHLORHEXIDINE GLUCONATE 15 MILLILITER(S): 1.2 RINSE ORAL at 17:05

## 2024-01-01 RX ADMIN — Medication 1 APPLICATION(S): at 21:41

## 2024-01-01 RX ADMIN — SODIUM CHLORIDE 4 MILLILITER(S): 9 INJECTION, SOLUTION INTRAMUSCULAR; INTRAVENOUS; SUBCUTANEOUS at 05:27

## 2024-01-01 RX ADMIN — ACETAMINOPHEN 500MG 400 MILLIGRAM(S): 500 TABLET, COATED ORAL at 05:26

## 2024-01-01 RX ADMIN — NYSTATIN 1 APPLICATION(S): 100000 POWDER TOPICAL at 22:22

## 2024-01-01 RX ADMIN — Medication 125 MICROGRAM(S): at 06:03

## 2024-01-01 RX ADMIN — CHLORHEXIDINE GLUCONATE 15 MILLILITER(S): 1.2 RINSE ORAL at 05:10

## 2024-01-01 RX ADMIN — Medication 125 MILLIGRAM(S): at 18:00

## 2024-01-01 RX ADMIN — DICLOFENAC SODIUM 2 GRAM(S): 20 SOLUTION TOPICAL at 00:06

## 2024-01-01 RX ADMIN — IPRATROPIUM BROMIDE AND ALBUTEROL SULFATE 3 MILLILITER(S): 2.5; .5 SOLUTION RESPIRATORY (INHALATION) at 05:48

## 2024-01-01 RX ADMIN — Medication 1 SPRAY(S): at 23:51

## 2024-01-01 RX ADMIN — DIPHENHYDRAMINE HYDROCHLORIDE AND LIDOCAINE HYDROCHLORIDE AND ALUMINUM HYDROXIDE AND MAGNESIUM HYDRO 5 MILLILITER(S): KIT at 05:47

## 2024-01-01 RX ADMIN — Medication 125 MILLIGRAM(S): at 00:04

## 2024-01-01 RX ADMIN — METRONIDAZOLE 100 MILLIGRAM(S): 500 TABLET ORAL at 14:40

## 2024-01-01 RX ADMIN — WITCH HAZEL 1 APPLICATION(S): 50 SOLUTION RECTAL at 06:49

## 2024-01-01 RX ADMIN — Medication 1 SPRAY(S): at 05:39

## 2024-01-01 RX ADMIN — SODIUM CHLORIDE 1 GRAM(S): 9 INJECTION, SOLUTION INTRAMUSCULAR; INTRAVENOUS; SUBCUTANEOUS at 05:35

## 2024-01-01 RX ADMIN — DIPHENHYDRAMINE HYDROCHLORIDE AND LIDOCAINE HYDROCHLORIDE AND ALUMINUM HYDROXIDE AND MAGNESIUM HYDRO 5 MILLILITER(S): KIT at 21:40

## 2024-01-01 RX ADMIN — Medication 8: at 23:11

## 2024-01-01 RX ADMIN — POVIDONE, POLYVINYL ALCOHOL 1 DROP(S): 20; 27 SOLUTION OPHTHALMIC at 12:02

## 2024-01-01 RX ADMIN — Medication 1 SPRAY(S): at 23:59

## 2024-01-01 RX ADMIN — NYSTATIN 1 APPLICATION(S): 100000 POWDER TOPICAL at 22:11

## 2024-01-01 RX ADMIN — SODIUM CHLORIDE 1 GRAM(S): 9 INJECTION, SOLUTION INTRAMUSCULAR; INTRAVENOUS; SUBCUTANEOUS at 06:40

## 2024-01-01 RX ADMIN — INSULIN GLARGINE 20 UNIT(S): 100 INJECTION, SOLUTION SUBCUTANEOUS at 22:15

## 2024-01-01 RX ADMIN — Medication 8: at 05:58

## 2024-01-01 RX ADMIN — LIDOCAINE 4 PATCH: 40 CREAM TOPICAL at 17:08

## 2024-01-01 RX ADMIN — TRIAMCINOLONE ACETONIDE 1 APPLICATION(S): 0.15 AEROSOL, SPRAY TOPICAL at 13:01

## 2024-01-01 RX ADMIN — Medication 100 MILLIGRAM(S): at 13:35

## 2024-01-01 RX ADMIN — LIDOCAINE 1 PATCH: 40 CREAM TOPICAL at 05:38

## 2024-01-01 RX ADMIN — Medication 40 MILLIGRAM(S): at 22:49

## 2024-01-01 RX ADMIN — PREDNISOLONE ACETATE 1 DROP(S): 1.2 SUSPENSION/ DROPS OPHTHALMIC at 12:19

## 2024-01-01 RX ADMIN — WITCH HAZEL 1 APPLICATION(S): 50 SOLUTION RECTAL at 23:26

## 2024-01-01 RX ADMIN — CEFTOLOZANE AND TAZOBACTAM 33.33 MILLIGRAM(S): 1; .5 INJECTION, POWDER, LYOPHILIZED, FOR SOLUTION INTRAVENOUS at 22:37

## 2024-01-01 RX ADMIN — Medication 10: at 00:03

## 2024-01-01 RX ADMIN — FENTANYL 1 PATCH: 12 PATCH, EXTENDED RELEASE TRANSDERMAL at 19:27

## 2024-01-01 RX ADMIN — DICLOFENAC SODIUM 2 GRAM(S): 20 SOLUTION TOPICAL at 00:20

## 2024-01-01 RX ADMIN — FENTANYL 1 PATCH: 12 PATCH, EXTENDED RELEASE TRANSDERMAL at 11:03

## 2024-01-01 RX ADMIN — DICLOFENAC SODIUM 2 GRAM(S): 20 SOLUTION TOPICAL at 05:59

## 2024-01-01 RX ADMIN — NYSTATIN 1 APPLICATION(S): 100000 POWDER TOPICAL at 05:19

## 2024-01-01 RX ADMIN — ACETAMINOPHEN 500MG 500 MILLIGRAM(S): 500 TABLET, COATED ORAL at 23:00

## 2024-01-01 RX ADMIN — Medication 1: at 23:48

## 2024-01-01 RX ADMIN — LIDOCAINE 4 PATCH: 40 CREAM TOPICAL at 18:12

## 2024-01-01 RX ADMIN — DIPHENHYDRAMINE HYDROCHLORIDE AND LIDOCAINE HYDROCHLORIDE AND ALUMINUM HYDROXIDE AND MAGNESIUM HYDRO 5 MILLILITER(S): KIT at 21:49

## 2024-01-01 RX ADMIN — PREDNISOLONE ACETATE 1 DROP(S): 1.2 SUSPENSION/ DROPS OPHTHALMIC at 12:18

## 2024-01-01 RX ADMIN — ESCITALOPRAM OXALATE 10 MILLIGRAM(S): 10 TABLET, FILM COATED ORAL at 17:58

## 2024-01-01 RX ADMIN — SODIUM CHLORIDE 1 GRAM(S): 9 INJECTION, SOLUTION INTRAMUSCULAR; INTRAVENOUS; SUBCUTANEOUS at 14:35

## 2024-01-01 RX ADMIN — TRIAMCINOLONE ACETONIDE 1 APPLICATION(S): 0.15 AEROSOL, SPRAY TOPICAL at 22:05

## 2024-01-01 RX ADMIN — SODIUM CHLORIDE 4 MILLILITER(S): 9 INJECTION, SOLUTION INTRAMUSCULAR; INTRAVENOUS; SUBCUTANEOUS at 11:07

## 2024-01-01 RX ADMIN — AMLODIPINE BESYLATE 10 MILLIGRAM(S): 10 TABLET ORAL at 06:05

## 2024-01-01 RX ADMIN — IPRATROPIUM BROMIDE AND ALBUTEROL SULFATE 3 MILLILITER(S): 2.5; .5 SOLUTION RESPIRATORY (INHALATION) at 06:00

## 2024-01-01 RX ADMIN — Medication 4: at 17:39

## 2024-01-01 RX ADMIN — ACETAMINOPHEN 500MG 650 MILLIGRAM(S): 500 TABLET, COATED ORAL at 14:00

## 2024-01-01 RX ADMIN — CHLORHEXIDINE GLUCONATE 15 MILLILITER(S): 1.2 RINSE ORAL at 06:18

## 2024-01-01 RX ADMIN — PANTOPRAZOLE SODIUM 40 MILLIGRAM(S): 40 TABLET, DELAYED RELEASE ORAL at 06:18

## 2024-01-01 RX ADMIN — Medication 25 GRAM(S): at 12:24

## 2024-01-01 RX ADMIN — CEFTOLOZANE AND TAZOBACTAM 33.33 MILLIGRAM(S): 1; .5 INJECTION, POWDER, LYOPHILIZED, FOR SOLUTION INTRAVENOUS at 13:11

## 2024-01-01 RX ADMIN — PANTOPRAZOLE SODIUM 40 MILLIGRAM(S): 40 TABLET, DELAYED RELEASE ORAL at 18:31

## 2024-01-01 RX ADMIN — LIDOCAINE 1 PATCH: 40 CREAM TOPICAL at 19:38

## 2024-01-01 RX ADMIN — METRONIDAZOLE 100 MILLIGRAM(S): 500 TABLET ORAL at 07:08

## 2024-01-01 RX ADMIN — IPRATROPIUM BROMIDE AND ALBUTEROL SULFATE 3 MILLILITER(S): 2.5; .5 SOLUTION RESPIRATORY (INHALATION) at 11:18

## 2024-01-01 RX ADMIN — IPRATROPIUM BROMIDE AND ALBUTEROL SULFATE 3 MILLILITER(S): 2.5; .5 SOLUTION RESPIRATORY (INHALATION) at 00:20

## 2024-01-01 RX ADMIN — Medication 2: at 09:14

## 2024-01-01 RX ADMIN — SODIUM CHLORIDE 4 MILLILITER(S): 9 INJECTION, SOLUTION INTRAMUSCULAR; INTRAVENOUS; SUBCUTANEOUS at 23:17

## 2024-01-01 RX ADMIN — Medication 50 MILLIGRAM(S): at 22:56

## 2024-01-01 RX ADMIN — DICLOFENAC SODIUM 2 GRAM(S): 20 SOLUTION TOPICAL at 15:12

## 2024-01-01 RX ADMIN — CHLORHEXIDINE GLUCONATE 15 MILLILITER(S): 1.2 RINSE ORAL at 17:08

## 2024-01-01 RX ADMIN — NYSTATIN 1 APPLICATION(S): 100000 POWDER TOPICAL at 21:52

## 2024-01-01 RX ADMIN — TRIAMCINOLONE ACETONIDE 1 APPLICATION(S): 0.15 AEROSOL, SPRAY TOPICAL at 21:51

## 2024-01-01 RX ADMIN — Medication 1 TABLET(S): at 13:36

## 2024-01-01 RX ADMIN — PREDNISOLONE ACETATE 1 DROP(S): 1.2 SUSPENSION/ DROPS OPHTHALMIC at 00:16

## 2024-01-01 RX ADMIN — PANTOPRAZOLE SODIUM 40 MILLIGRAM(S): 40 TABLET, DELAYED RELEASE ORAL at 18:21

## 2024-01-01 RX ADMIN — DICLOFENAC SODIUM 2 GRAM(S): 20 SOLUTION TOPICAL at 06:28

## 2024-01-01 RX ADMIN — POTASSIUM PHOSPHATE, MONOBASIC POTASSIUM PHOSPHATE, DIBASIC INJECTION, 62.5 MILLIMOLE(S): 236; 224 SOLUTION, CONCENTRATE INTRAVENOUS at 16:13

## 2024-01-01 RX ADMIN — POTASSIUM CHLORIDE 20 MILLIEQUIVALENT(S): 600 TABLET, EXTENDED RELEASE ORAL at 22:03

## 2024-01-01 RX ADMIN — IPRATROPIUM BROMIDE AND ALBUTEROL SULFATE 3 MILLILITER(S): 2.5; .5 SOLUTION RESPIRATORY (INHALATION) at 18:05

## 2024-01-01 RX ADMIN — CHLORHEXIDINE GLUCONATE 15 MILLILITER(S): 1.2 RINSE ORAL at 06:15

## 2024-01-01 RX ADMIN — CHLORHEXIDINE GLUCONATE 15 MILLILITER(S): 1.2 RINSE ORAL at 06:39

## 2024-01-01 RX ADMIN — CEFEPIME 100 MILLIGRAM(S): 2 INJECTION, POWDER, FOR SOLUTION INTRAVENOUS at 13:16

## 2024-01-01 RX ADMIN — POVIDONE, POLYVINYL ALCOHOL 1 DROP(S): 20; 27 SOLUTION OPHTHALMIC at 22:52

## 2024-01-01 RX ADMIN — Medication 1 SPRAY(S): at 05:51

## 2024-01-01 RX ADMIN — Medication 125 MILLIGRAM(S): at 23:55

## 2024-01-01 RX ADMIN — LIDOCAINE 1 PATCH: 40 CREAM TOPICAL at 16:02

## 2024-01-01 RX ADMIN — SODIUM CHLORIDE 4 MILLILITER(S): 9 INJECTION, SOLUTION INTRAMUSCULAR; INTRAVENOUS; SUBCUTANEOUS at 17:29

## 2024-01-01 RX ADMIN — PREDNISOLONE ACETATE 1 DROP(S): 1.2 SUSPENSION/ DROPS OPHTHALMIC at 11:58

## 2024-01-01 RX ADMIN — LIDOCAINE 4 PATCH: 40 CREAM TOPICAL at 19:11

## 2024-01-01 RX ADMIN — FENTANYL 1 PATCH: 12 PATCH, EXTENDED RELEASE TRANSDERMAL at 10:54

## 2024-01-01 RX ADMIN — DICLOFENAC SODIUM 2 GRAM(S): 20 SOLUTION TOPICAL at 23:43

## 2024-01-01 RX ADMIN — POVIDONE, POLYVINYL ALCOHOL 1 DROP(S): 20; 27 SOLUTION OPHTHALMIC at 11:46

## 2024-01-01 RX ADMIN — GABAPENTIN 250 MILLIGRAM(S): 300 CAPSULE ORAL at 21:43

## 2024-01-01 RX ADMIN — TRIAMCINOLONE ACETONIDE 1 APPLICATION(S): 0.15 AEROSOL, SPRAY TOPICAL at 13:13

## 2024-01-01 RX ADMIN — Medication 5 MILLILITER(S): at 11:59

## 2024-01-01 RX ADMIN — SODIUM CHLORIDE 1 GRAM(S): 9 INJECTION, SOLUTION INTRAMUSCULAR; INTRAVENOUS; SUBCUTANEOUS at 18:25

## 2024-01-01 RX ADMIN — NYSTATIN 1 APPLICATION(S): 100000 POWDER TOPICAL at 05:27

## 2024-01-01 RX ADMIN — TRIAMCINOLONE ACETONIDE 1 APPLICATION(S): 0.15 AEROSOL, SPRAY TOPICAL at 05:22

## 2024-01-01 RX ADMIN — Medication 1 SPRAY(S): at 05:40

## 2024-01-01 RX ADMIN — Medication 1 SPRAY(S): at 06:41

## 2024-01-01 RX ADMIN — WITCH HAZEL 1 APPLICATION(S): 50 SOLUTION RECTAL at 17:22

## 2024-01-01 RX ADMIN — NYSTATIN 1 APPLICATION(S): 100000 POWDER TOPICAL at 13:44

## 2024-01-01 RX ADMIN — NYSTATIN 1 APPLICATION(S): 100000 POWDER TOPICAL at 22:03

## 2024-01-01 RX ADMIN — SIMETHICONE 80 MILLIGRAM(S): 80 TABLET, CHEWABLE ORAL at 12:07

## 2024-01-01 RX ADMIN — Medication 5 MILLILITER(S): at 18:21

## 2024-01-01 RX ADMIN — NYSTATIN 1 APPLICATION(S): 100000 POWDER TOPICAL at 06:11

## 2024-01-01 RX ADMIN — Medication 1 SPRAY(S): at 13:54

## 2024-01-01 RX ADMIN — SODIUM CHLORIDE 4 MILLILITER(S): 9 INJECTION, SOLUTION INTRAMUSCULAR; INTRAVENOUS; SUBCUTANEOUS at 17:25

## 2024-01-01 RX ADMIN — Medication 125 MICROGRAM(S): at 04:42

## 2024-01-01 RX ADMIN — SODIUM CHLORIDE 1 GRAM(S): 9 INJECTION, SOLUTION INTRAMUSCULAR; INTRAVENOUS; SUBCUTANEOUS at 18:40

## 2024-01-01 RX ADMIN — Medication 125 MILLIGRAM(S): at 05:13

## 2024-01-01 RX ADMIN — SODIUM CHLORIDE 1 GRAM(S): 9 INJECTION, SOLUTION INTRAMUSCULAR; INTRAVENOUS; SUBCUTANEOUS at 05:15

## 2024-01-01 RX ADMIN — SODIUM CHLORIDE 1 GRAM(S): 9 INJECTION, SOLUTION INTRAMUSCULAR; INTRAVENOUS; SUBCUTANEOUS at 17:37

## 2024-01-01 RX ADMIN — ACETAMINOPHEN 500MG 1000 MILLIGRAM(S): 500 TABLET, COATED ORAL at 13:01

## 2024-01-01 RX ADMIN — WITCH HAZEL 1 APPLICATION(S): 50 SOLUTION RECTAL at 17:37

## 2024-01-01 RX ADMIN — Medication 5 MILLILITER(S): at 21:24

## 2024-01-01 RX ADMIN — IPRATROPIUM BROMIDE AND ALBUTEROL SULFATE 3 MILLILITER(S): 2.5; .5 SOLUTION RESPIRATORY (INHALATION) at 17:02

## 2024-01-01 RX ADMIN — CHLORHEXIDINE GLUCONATE 15 MILLILITER(S): 1.2 RINSE ORAL at 17:56

## 2024-01-01 RX ADMIN — Medication 1: at 00:17

## 2024-01-01 RX ADMIN — Medication 12.5 MILLIGRAM(S): at 05:12

## 2024-01-01 RX ADMIN — METRONIDAZOLE 100 MILLIGRAM(S): 500 TABLET ORAL at 06:42

## 2024-01-01 RX ADMIN — ACETAMINOPHEN, DIPHENHYDRAMINE HCL, PHENYLEPHRINE HCL 12 MILLIGRAM(S): 325; 25; 5 TABLET ORAL at 22:07

## 2024-01-01 RX ADMIN — Medication 2: at 00:05

## 2024-01-01 RX ADMIN — ACETAMINOPHEN 500MG 650 MILLIGRAM(S): 500 TABLET, COATED ORAL at 12:47

## 2024-01-01 RX ADMIN — FENTANYL 1 PATCH: 12 PATCH, EXTENDED RELEASE TRANSDERMAL at 19:36

## 2024-01-01 RX ADMIN — DIPHENHYDRAMINE HYDROCHLORIDE AND LIDOCAINE HYDROCHLORIDE AND ALUMINUM HYDROXIDE AND MAGNESIUM HYDRO 5 MILLILITER(S): KIT at 05:11

## 2024-01-01 RX ADMIN — Medication 5 MILLILITER(S): at 11:45

## 2024-01-01 RX ADMIN — Medication 1 SPRAY(S): at 05:53

## 2024-01-01 RX ADMIN — ENOXAPARIN SODIUM 30 MILLIGRAM(S): 30 INJECTION SUBCUTANEOUS at 21:58

## 2024-01-01 RX ADMIN — Medication 1 TABLET(S): at 11:40

## 2024-01-01 RX ADMIN — SODIUM CHLORIDE 1 GRAM(S): 9 INJECTION, SOLUTION INTRAMUSCULAR; INTRAVENOUS; SUBCUTANEOUS at 06:10

## 2024-01-01 RX ADMIN — FENTANYL 1 PATCH: 12 PATCH, EXTENDED RELEASE TRANSDERMAL at 12:05

## 2024-01-01 RX ADMIN — ACETAMINOPHEN 500MG 500 MILLIGRAM(S): 500 TABLET, COATED ORAL at 06:40

## 2024-01-01 RX ADMIN — IPRATROPIUM BROMIDE AND ALBUTEROL SULFATE 3 MILLILITER(S): 2.5; .5 SOLUTION RESPIRATORY (INHALATION) at 23:25

## 2024-01-01 RX ADMIN — MEROPENEM 100 MILLIGRAM(S): 500 INJECTION, POWDER, FOR SOLUTION INTRAVENOUS at 22:09

## 2024-01-01 RX ADMIN — INSULIN GLARGINE 10 UNIT(S): 100 INJECTION, SOLUTION SUBCUTANEOUS at 06:00

## 2024-01-01 RX ADMIN — INSULIN GLARGINE 10 UNIT(S): 100 INJECTION, SOLUTION SUBCUTANEOUS at 05:47

## 2024-01-01 RX ADMIN — DIPHENHYDRAMINE HYDROCHLORIDE AND LIDOCAINE HYDROCHLORIDE AND ALUMINUM HYDROXIDE AND MAGNESIUM HYDRO 5 MILLILITER(S): KIT at 14:00

## 2024-01-01 RX ADMIN — ACETAMINOPHEN 500MG 1000 MILLIGRAM(S): 500 TABLET, COATED ORAL at 21:27

## 2024-01-01 RX ADMIN — Medication 125 MICROGRAM(S): at 05:54

## 2024-01-01 RX ADMIN — Medication 125 MILLIGRAM(S): at 23:35

## 2024-01-01 RX ADMIN — Medication 2 MILLIGRAM(S): at 03:13

## 2024-01-01 RX ADMIN — DICLOFENAC SODIUM 2 GRAM(S): 20 SOLUTION TOPICAL at 13:55

## 2024-01-01 RX ADMIN — Medication 5 MILLILITER(S): at 12:37

## 2024-01-01 RX ADMIN — INSULIN GLARGINE 18 UNIT(S): 100 INJECTION, SOLUTION SUBCUTANEOUS at 05:47

## 2024-01-01 RX ADMIN — POTASSIUM CHLORIDE 40 MILLIEQUIVALENT(S): 600 TABLET, EXTENDED RELEASE ORAL at 09:27

## 2024-01-01 RX ADMIN — AMLODIPINE BESYLATE 10 MILLIGRAM(S): 10 TABLET ORAL at 05:38

## 2024-01-01 RX ADMIN — HYDRALAZINE HYDROCHLORIDE 10 MILLIGRAM(S): 10 TABLET ORAL at 13:14

## 2024-01-01 RX ADMIN — ACETAMINOPHEN 500MG 1000 MILLIGRAM(S): 500 TABLET, COATED ORAL at 05:38

## 2024-01-01 RX ADMIN — CHLORHEXIDINE GLUCONATE 1 APPLICATION(S): 1.2 RINSE ORAL at 22:01

## 2024-01-01 RX ADMIN — Medication 1 SPRAY(S): at 11:39

## 2024-01-01 RX ADMIN — PREDNISOLONE ACETATE 1 DROP(S): 1.2 SUSPENSION/ DROPS OPHTHALMIC at 11:47

## 2024-01-01 RX ADMIN — CHLORHEXIDINE GLUCONATE 15 MILLILITER(S): 1.2 RINSE ORAL at 19:34

## 2024-01-01 RX ADMIN — GABAPENTIN 250 MILLIGRAM(S): 300 CAPSULE ORAL at 23:25

## 2024-01-01 RX ADMIN — Medication 12.5 MILLIGRAM(S): at 14:13

## 2024-01-01 RX ADMIN — FENTANYL 1 PATCH: 12 PATCH, EXTENDED RELEASE TRANSDERMAL at 07:47

## 2024-01-01 RX ADMIN — IPRATROPIUM BROMIDE AND ALBUTEROL SULFATE 3 MILLILITER(S): 2.5; .5 SOLUTION RESPIRATORY (INHALATION) at 05:22

## 2024-01-01 RX ADMIN — Medication 5 MILLILITER(S): at 18:25

## 2024-01-01 RX ADMIN — GABAPENTIN 250 MILLIGRAM(S): 300 CAPSULE ORAL at 22:06

## 2024-01-01 RX ADMIN — ACETAMINOPHEN, DIPHENHYDRAMINE HCL, PHENYLEPHRINE HCL 12 MILLIGRAM(S): 325; 25; 5 TABLET ORAL at 22:55

## 2024-01-01 RX ADMIN — Medication 5 MILLILITER(S): at 19:14

## 2024-01-01 RX ADMIN — NYSTATIN 1 APPLICATION(S): 100000 POWDER TOPICAL at 21:54

## 2024-01-01 RX ADMIN — HYDROMORPHONE HYDROCHLORIDE 0.2 MILLIGRAM(S): 2 TABLET ORAL at 22:25

## 2024-01-01 RX ADMIN — NYSTATIN 1 APPLICATION(S): 100000 POWDER TOPICAL at 05:17

## 2024-01-01 RX ADMIN — IPRATROPIUM BROMIDE AND ALBUTEROL SULFATE 3 MILLILITER(S): 2.5; .5 SOLUTION RESPIRATORY (INHALATION) at 17:14

## 2024-01-01 RX ADMIN — IPRATROPIUM BROMIDE AND ALBUTEROL SULFATE 3 MILLILITER(S): 2.5; .5 SOLUTION RESPIRATORY (INHALATION) at 18:06

## 2024-01-01 RX ADMIN — Medication 50 MILLILITER(S): at 13:04

## 2024-01-01 RX ADMIN — Medication 5 MILLILITER(S): at 00:24

## 2024-01-01 RX ADMIN — Medication 1: at 17:27

## 2024-01-01 RX ADMIN — HYDROMORPHONE HYDROCHLORIDE 0.5 MILLIGRAM(S): 2 TABLET ORAL at 11:35

## 2024-01-01 RX ADMIN — Medication 5 MILLILITER(S): at 17:58

## 2024-01-01 RX ADMIN — PANTOPRAZOLE SODIUM 40 MILLIGRAM(S): 40 TABLET, DELAYED RELEASE ORAL at 17:23

## 2024-01-01 RX ADMIN — PREDNISOLONE ACETATE 1 DROP(S): 1.2 SUSPENSION/ DROPS OPHTHALMIC at 05:13

## 2024-01-01 RX ADMIN — SIMETHICONE 80 MILLIGRAM(S): 80 TABLET, CHEWABLE ORAL at 23:48

## 2024-01-01 RX ADMIN — DICLOFENAC SODIUM 2 GRAM(S): 20 SOLUTION TOPICAL at 17:48

## 2024-01-01 RX ADMIN — CHLORHEXIDINE GLUCONATE 1 APPLICATION(S): 1.2 RINSE ORAL at 11:43

## 2024-01-01 RX ADMIN — Medication 1 SPRAY(S): at 18:03

## 2024-01-01 RX ADMIN — PANTOPRAZOLE SODIUM 40 MILLIGRAM(S): 40 TABLET, DELAYED RELEASE ORAL at 06:43

## 2024-01-01 RX ADMIN — Medication 5 MILLILITER(S): at 17:44

## 2024-01-01 RX ADMIN — PANTOPRAZOLE SODIUM 40 MILLIGRAM(S): 40 TABLET, DELAYED RELEASE ORAL at 18:13

## 2024-01-01 RX ADMIN — Medication 3: at 12:22

## 2024-01-01 RX ADMIN — SODIUM CHLORIDE 1 GRAM(S): 9 INJECTION, SOLUTION INTRAMUSCULAR; INTRAVENOUS; SUBCUTANEOUS at 07:08

## 2024-01-01 RX ADMIN — INSULIN GLARGINE 25 UNIT(S): 100 INJECTION, SOLUTION SUBCUTANEOUS at 18:06

## 2024-01-01 RX ADMIN — DICLOFENAC SODIUM 2 GRAM(S): 20 SOLUTION TOPICAL at 11:42

## 2024-01-01 RX ADMIN — WITCH HAZEL 1 APPLICATION(S): 50 SOLUTION RECTAL at 05:40

## 2024-01-01 RX ADMIN — CHLORHEXIDINE GLUCONATE 15 MILLILITER(S): 1.2 RINSE ORAL at 18:06

## 2024-01-01 RX ADMIN — Medication 12.5 MILLIGRAM(S): at 22:01

## 2024-01-01 RX ADMIN — SODIUM CHLORIDE 1 GRAM(S): 9 INJECTION, SOLUTION INTRAMUSCULAR; INTRAVENOUS; SUBCUTANEOUS at 23:03

## 2024-01-01 RX ADMIN — WITCH HAZEL 1 APPLICATION(S): 50 SOLUTION RECTAL at 17:07

## 2024-01-01 RX ADMIN — Medication 25 UNIT(S): at 11:35

## 2024-01-01 RX ADMIN — NYSTATIN 1 APPLICATION(S): 100000 POWDER TOPICAL at 14:00

## 2024-01-01 RX ADMIN — PANTOPRAZOLE SODIUM 40 MILLIGRAM(S): 40 TABLET, DELAYED RELEASE ORAL at 18:06

## 2024-01-01 RX ADMIN — ESCITALOPRAM OXALATE 10 MILLIGRAM(S): 10 TABLET, FILM COATED ORAL at 17:39

## 2024-01-01 RX ADMIN — Medication 1 SPRAY(S): at 18:06

## 2024-01-01 RX ADMIN — ENOXAPARIN SODIUM 30 MILLIGRAM(S): 30 INJECTION SUBCUTANEOUS at 22:03

## 2024-01-01 RX ADMIN — Medication 12.5 MILLIGRAM(S): at 14:27

## 2024-01-01 RX ADMIN — FUROSEMIDE 20 MILLIGRAM(S): 40 TABLET ORAL at 19:37

## 2024-01-01 RX ADMIN — GABAPENTIN 250 MILLIGRAM(S): 300 CAPSULE ORAL at 21:28

## 2024-01-01 RX ADMIN — POVIDONE, POLYVINYL ALCOHOL 1 DROP(S): 20; 27 SOLUTION OPHTHALMIC at 17:40

## 2024-01-01 RX ADMIN — Medication 500 MILLIGRAM(S): at 11:50

## 2024-01-01 RX ADMIN — IPRATROPIUM BROMIDE AND ALBUTEROL SULFATE 3 MILLILITER(S): 2.5; .5 SOLUTION RESPIRATORY (INHALATION) at 00:04

## 2024-01-01 RX ADMIN — POVIDONE, POLYVINYL ALCOHOL 1 DROP(S): 20; 27 SOLUTION OPHTHALMIC at 22:09

## 2024-01-01 RX ADMIN — POVIDONE, POLYVINYL ALCOHOL 1 DROP(S): 20; 27 SOLUTION OPHTHALMIC at 12:25

## 2024-01-01 RX ADMIN — LIDOCAINE 4 PATCH: 40 CREAM TOPICAL at 19:13

## 2024-01-01 RX ADMIN — Medication 5 MILLILITER(S): at 00:54

## 2024-01-01 RX ADMIN — IPRATROPIUM BROMIDE AND ALBUTEROL SULFATE 3 MILLILITER(S): 2.5; .5 SOLUTION RESPIRATORY (INHALATION) at 05:49

## 2024-01-01 RX ADMIN — Medication 300 MILLIGRAM(S): at 12:07

## 2024-01-01 RX ADMIN — Medication 2 MILLIGRAM(S): at 05:02

## 2024-01-01 RX ADMIN — PREDNISOLONE ACETATE 1 DROP(S): 1.2 SUSPENSION/ DROPS OPHTHALMIC at 11:46

## 2024-01-01 RX ADMIN — Medication 2 MILLIGRAM(S): at 05:50

## 2024-01-01 RX ADMIN — Medication 5 MILLILITER(S): at 11:23

## 2024-01-01 RX ADMIN — Medication 1: at 12:11

## 2024-01-01 RX ADMIN — WITCH HAZEL 1 APPLICATION(S): 50 SOLUTION RECTAL at 18:09

## 2024-01-01 RX ADMIN — Medication 3 MILLILITER(S): at 23:23

## 2024-01-01 RX ADMIN — Medication 1 PATCH: at 08:43

## 2024-01-01 RX ADMIN — Medication 1 TABLET(S): at 11:51

## 2024-01-01 RX ADMIN — GABAPENTIN 250 MILLIGRAM(S): 300 CAPSULE ORAL at 22:16

## 2024-01-01 RX ADMIN — PANTOPRAZOLE SODIUM 40 MILLIGRAM(S): 40 TABLET, DELAYED RELEASE ORAL at 05:50

## 2024-01-01 RX ADMIN — DICLOFENAC SODIUM 2 GRAM(S): 20 SOLUTION TOPICAL at 22:27

## 2024-01-01 RX ADMIN — Medication 4: at 06:08

## 2024-01-01 RX ADMIN — PANTOPRAZOLE SODIUM 40 MILLIGRAM(S): 40 TABLET, DELAYED RELEASE ORAL at 05:13

## 2024-01-01 RX ADMIN — Medication 5 MILLILITER(S): at 02:08

## 2024-01-01 RX ADMIN — FENTANYL 1 PATCH: 12 PATCH, EXTENDED RELEASE TRANSDERMAL at 18:20

## 2024-01-01 RX ADMIN — Medication 2: at 06:04

## 2024-01-01 RX ADMIN — FENTANYL 1 PATCH: 12 PATCH, EXTENDED RELEASE TRANSDERMAL at 23:30

## 2024-01-01 RX ADMIN — IPRATROPIUM BROMIDE AND ALBUTEROL SULFATE 3 MILLILITER(S): 2.5; .5 SOLUTION RESPIRATORY (INHALATION) at 05:25

## 2024-01-01 RX ADMIN — CEFEPIME 100 MILLIGRAM(S): 2 INJECTION, POWDER, FOR SOLUTION INTRAVENOUS at 15:20

## 2024-01-01 RX ADMIN — Medication 125 MICROGRAM(S): at 05:00

## 2024-01-01 RX ADMIN — WITCH HAZEL 1 APPLICATION(S): 50 SOLUTION RECTAL at 10:27

## 2024-01-01 RX ADMIN — Medication 125 MILLIGRAM(S): at 17:59

## 2024-01-01 RX ADMIN — Medication 1 SPRAY(S): at 18:25

## 2024-01-01 RX ADMIN — Medication 125 MILLIGRAM(S): at 11:24

## 2024-01-01 RX ADMIN — NYSTATIN 1 APPLICATION(S): 100000 POWDER TOPICAL at 21:25

## 2024-01-01 RX ADMIN — Medication 7 UNIT(S): at 23:47

## 2024-01-01 RX ADMIN — Medication 5 MILLILITER(S): at 22:27

## 2024-01-01 RX ADMIN — Medication 125 MICROGRAM(S): at 03:13

## 2024-01-01 RX ADMIN — CEFEPIME 100 MILLIGRAM(S): 2 INJECTION, POWDER, FOR SOLUTION INTRAVENOUS at 22:22

## 2024-01-01 RX ADMIN — TRIAMCINOLONE ACETONIDE 1 APPLICATION(S): 0.15 AEROSOL, SPRAY TOPICAL at 05:48

## 2024-01-01 RX ADMIN — Medication 40 UNIT(S): at 12:55

## 2024-01-01 RX ADMIN — SODIUM CHLORIDE 4 MILLILITER(S): 9 INJECTION, SOLUTION INTRAMUSCULAR; INTRAVENOUS; SUBCUTANEOUS at 23:08

## 2024-01-01 RX ADMIN — Medication 5 MILLILITER(S): at 18:52

## 2024-01-01 RX ADMIN — Medication 25 GRAM(S): at 13:17

## 2024-01-01 RX ADMIN — CHLORHEXIDINE GLUCONATE 15 MILLILITER(S): 1.2 RINSE ORAL at 18:35

## 2024-01-01 RX ADMIN — Medication 1 TABLET(S): at 11:01

## 2024-01-01 RX ADMIN — FENTANYL 1 PATCH: 12 PATCH, EXTENDED RELEASE TRANSDERMAL at 19:29

## 2024-01-01 RX ADMIN — MEROPENEM 100 MILLIGRAM(S): 500 INJECTION, POWDER, FOR SOLUTION INTRAVENOUS at 05:14

## 2024-01-01 RX ADMIN — INSULIN GLARGINE 15 UNIT(S): 100 INJECTION, SOLUTION SUBCUTANEOUS at 21:34

## 2024-01-01 RX ADMIN — LIDOCAINE 4 PATCH: 40 CREAM TOPICAL at 11:05

## 2024-01-01 RX ADMIN — CHLORHEXIDINE GLUCONATE 1 APPLICATION(S): 1.2 RINSE ORAL at 22:27

## 2024-01-01 RX ADMIN — TRIAMCINOLONE ACETONIDE 1 APPLICATION(S): 0.15 AEROSOL, SPRAY TOPICAL at 13:15

## 2024-01-01 RX ADMIN — Medication 4: at 12:32

## 2024-01-01 RX ADMIN — Medication 102 GRAM(S): at 18:04

## 2024-01-01 RX ADMIN — OXYCODONE HYDROCHLORIDE 2.5 MILLIGRAM(S): 30 TABLET ORAL at 20:06

## 2024-01-01 RX ADMIN — GABAPENTIN 250 MILLIGRAM(S): 300 CAPSULE ORAL at 21:48

## 2024-01-01 RX ADMIN — Medication 125 MILLIGRAM(S): at 00:45

## 2024-01-01 RX ADMIN — ACETAMINOPHEN 500MG 1000 MILLIGRAM(S): 500 TABLET, COATED ORAL at 21:47

## 2024-01-01 RX ADMIN — NYSTATIN 1 APPLICATION(S): 100000 POWDER TOPICAL at 05:40

## 2024-01-01 RX ADMIN — Medication 1 TABLET(S): at 11:17

## 2024-01-01 RX ADMIN — DIPHENHYDRAMINE HYDROCHLORIDE AND LIDOCAINE HYDROCHLORIDE AND ALUMINUM HYDROXIDE AND MAGNESIUM HYDRO 5 MILLILITER(S): KIT at 04:38

## 2024-01-01 RX ADMIN — Medication 5 MILLILITER(S): at 11:55

## 2024-01-01 RX ADMIN — Medication 1 SPRAY(S): at 17:10

## 2024-01-01 RX ADMIN — PREDNISOLONE ACETATE 1 DROP(S): 1.2 SUSPENSION/ DROPS OPHTHALMIC at 12:27

## 2024-01-01 RX ADMIN — GABAPENTIN 250 MILLIGRAM(S): 300 CAPSULE ORAL at 22:11

## 2024-01-01 RX ADMIN — IPRATROPIUM BROMIDE AND ALBUTEROL SULFATE 3 MILLILITER(S): 2.5; .5 SOLUTION RESPIRATORY (INHALATION) at 06:05

## 2024-01-01 RX ADMIN — Medication 125 MILLIGRAM(S): at 05:20

## 2024-01-01 RX ADMIN — POVIDONE, POLYVINYL ALCOHOL 1 DROP(S): 20; 27 SOLUTION OPHTHALMIC at 18:06

## 2024-01-01 RX ADMIN — Medication 500 MILLIGRAM(S): at 12:30

## 2024-01-01 RX ADMIN — ESCITALOPRAM OXALATE 10 MILLIGRAM(S): 10 TABLET, FILM COATED ORAL at 19:14

## 2024-01-01 RX ADMIN — Medication 125 MILLIGRAM(S): at 11:19

## 2024-01-01 RX ADMIN — HYDRALAZINE HYDROCHLORIDE 10 MILLIGRAM(S): 10 TABLET ORAL at 22:05

## 2024-01-01 RX ADMIN — CHLORHEXIDINE GLUCONATE 15 MILLILITER(S): 1.2 RINSE ORAL at 17:17

## 2024-01-01 RX ADMIN — Medication 4: at 23:30

## 2024-01-01 RX ADMIN — Medication 125 MILLIGRAM(S): at 05:26

## 2024-01-01 RX ADMIN — Medication 1 TABLET(S): at 12:37

## 2024-01-01 RX ADMIN — Medication 25 GRAM(S): at 12:12

## 2024-01-01 RX ADMIN — IPRATROPIUM BROMIDE AND ALBUTEROL SULFATE 3 MILLILITER(S): 2.5; .5 SOLUTION RESPIRATORY (INHALATION) at 05:14

## 2024-01-01 RX ADMIN — Medication 5 MILLILITER(S): at 00:05

## 2024-01-01 RX ADMIN — Medication 14 UNIT(S): at 18:06

## 2024-01-01 RX ADMIN — PREDNISOLONE ACETATE 1 DROP(S): 1.2 SUSPENSION/ DROPS OPHTHALMIC at 11:17

## 2024-01-01 RX ADMIN — HYDRALAZINE HYDROCHLORIDE 25 MILLIGRAM(S): 10 TABLET ORAL at 14:51

## 2024-01-01 RX ADMIN — POVIDONE, POLYVINYL ALCOHOL 1 DROP(S): 20; 27 SOLUTION OPHTHALMIC at 12:18

## 2024-01-01 RX ADMIN — CEFEPIME 100 MILLIGRAM(S): 2 INJECTION, POWDER, FOR SOLUTION INTRAVENOUS at 05:25

## 2024-01-01 RX ADMIN — DICLOFENAC SODIUM 2 GRAM(S): 20 SOLUTION TOPICAL at 23:29

## 2024-01-01 RX ADMIN — Medication 250 MILLIGRAM(S): at 23:59

## 2024-01-01 RX ADMIN — NYSTATIN 1 APPLICATION(S): 100000 POWDER TOPICAL at 23:26

## 2024-01-01 RX ADMIN — Medication 50 MILLILITER(S): at 14:54

## 2024-01-01 RX ADMIN — Medication 125 MILLIGRAM(S): at 05:23

## 2024-01-01 RX ADMIN — MEROPENEM 100 MILLIGRAM(S): 500 INJECTION, POWDER, FOR SOLUTION INTRAVENOUS at 22:29

## 2024-01-01 RX ADMIN — Medication 1 TABLET(S): at 17:30

## 2024-01-01 RX ADMIN — Medication 1 APPLICATION(S): at 05:12

## 2024-01-01 RX ADMIN — NYSTATIN CREAM 1 APPLICATION(S): 100000 CREAM TOPICAL at 21:53

## 2024-01-01 RX ADMIN — ACETAMINOPHEN 500MG 400 MILLIGRAM(S): 500 TABLET, COATED ORAL at 06:52

## 2024-01-01 RX ADMIN — Medication 125 MILLIGRAM(S): at 19:13

## 2024-01-01 RX ADMIN — Medication 2: at 11:48

## 2024-01-01 RX ADMIN — NYSTATIN 1 APPLICATION(S): 100000 POWDER TOPICAL at 06:12

## 2024-01-01 RX ADMIN — POVIDONE, POLYVINYL ALCOHOL 1 DROP(S): 20; 27 SOLUTION OPHTHALMIC at 22:27

## 2024-01-01 RX ADMIN — POVIDONE, POLYVINYL ALCOHOL 1 DROP(S): 20; 27 SOLUTION OPHTHALMIC at 06:18

## 2024-01-01 RX ADMIN — ACETAMINOPHEN 500MG 650 MILLIGRAM(S): 500 TABLET, COATED ORAL at 21:43

## 2024-01-01 RX ADMIN — Medication 125 MILLILITER(S): at 13:35

## 2024-01-01 RX ADMIN — DICLOFENAC SODIUM 2 GRAM(S): 20 SOLUTION TOPICAL at 13:03

## 2024-01-01 RX ADMIN — Medication 5 MILLILITER(S): at 18:11

## 2024-01-01 RX ADMIN — Medication 5 UNIT(S): at 17:40

## 2024-01-01 RX ADMIN — IPRATROPIUM BROMIDE AND ALBUTEROL SULFATE 3 MILLILITER(S): 2.5; .5 SOLUTION RESPIRATORY (INHALATION) at 12:12

## 2024-01-01 RX ADMIN — Medication 125 MILLIGRAM(S): at 12:05

## 2024-01-01 RX ADMIN — CHLORHEXIDINE GLUCONATE 15 MILLILITER(S): 1.2 RINSE ORAL at 05:17

## 2024-01-01 RX ADMIN — SODIUM CHLORIDE 1000 MILLILITER(S): 9 INJECTION, SOLUTION INTRAMUSCULAR; INTRAVENOUS; SUBCUTANEOUS at 02:56

## 2024-01-01 RX ADMIN — FENTANYL 1 PATCH: 12 PATCH, EXTENDED RELEASE TRANSDERMAL at 18:04

## 2024-01-01 RX ADMIN — CHLORHEXIDINE GLUCONATE 15 MILLILITER(S): 1.2 RINSE ORAL at 05:53

## 2024-01-01 RX ADMIN — INSULIN GLARGINE 20 UNIT(S): 100 INJECTION, SOLUTION SUBCUTANEOUS at 22:22

## 2024-01-01 RX ADMIN — Medication 1 TABLET(S): at 10:31

## 2024-01-01 RX ADMIN — DICLOFENAC SODIUM 2 GRAM(S): 20 SOLUTION TOPICAL at 05:23

## 2024-01-01 RX ADMIN — IPRATROPIUM BROMIDE AND ALBUTEROL SULFATE 3 MILLILITER(S): 2.5; .5 SOLUTION RESPIRATORY (INHALATION) at 17:35

## 2024-01-01 RX ADMIN — ACETAMINOPHEN, DIPHENHYDRAMINE HCL, PHENYLEPHRINE HCL 12 MILLIGRAM(S): 325; 25; 5 TABLET ORAL at 22:16

## 2024-01-01 RX ADMIN — LIDOCAINE 1 PATCH: 40 CREAM TOPICAL at 20:01

## 2024-01-01 RX ADMIN — Medication 1 APPLICATION(S): at 23:27

## 2024-01-01 RX ADMIN — LIDOCAINE 4 PATCH: 40 CREAM TOPICAL at 21:40

## 2024-01-01 RX ADMIN — Medication 1 SPRAY(S): at 22:29

## 2024-01-01 RX ADMIN — LIDOCAINE 4 PATCH: 40 CREAM TOPICAL at 17:37

## 2024-01-01 RX ADMIN — IPRATROPIUM BROMIDE AND ALBUTEROL SULFATE 3 MILLILITER(S): 2.5; .5 SOLUTION RESPIRATORY (INHALATION) at 11:01

## 2024-01-01 RX ADMIN — LIDOCAINE 4 PATCH: 40 CREAM TOPICAL at 08:57

## 2024-01-01 RX ADMIN — NYSTATIN 1 APPLICATION(S): 100000 POWDER TOPICAL at 23:17

## 2024-01-01 RX ADMIN — SODIUM CHLORIDE 1 GRAM(S): 9 INJECTION, SOLUTION INTRAMUSCULAR; INTRAVENOUS; SUBCUTANEOUS at 05:48

## 2024-01-01 RX ADMIN — Medication 27 UNIT(S): at 12:05

## 2024-01-01 RX ADMIN — TRIAMCINOLONE ACETONIDE 1 APPLICATION(S): 0.15 AEROSOL, SPRAY TOPICAL at 06:13

## 2024-01-01 RX ADMIN — ACETAMINOPHEN 500MG 650 MILLIGRAM(S): 500 TABLET, COATED ORAL at 05:30

## 2024-01-01 RX ADMIN — Medication 1: at 23:50

## 2024-01-01 RX ADMIN — HYDRALAZINE HYDROCHLORIDE 10 MILLIGRAM(S): 10 TABLET ORAL at 05:37

## 2024-01-01 RX ADMIN — TRIAMCINOLONE ACETONIDE 1 APPLICATION(S): 0.15 AEROSOL, SPRAY TOPICAL at 05:18

## 2024-01-01 RX ADMIN — NYSTATIN 1 APPLICATION(S): 100000 POWDER TOPICAL at 14:21

## 2024-01-01 RX ADMIN — HYDROMORPHONE HYDROCHLORIDE 0.5 MILLIGRAM(S): 2 TABLET ORAL at 17:22

## 2024-01-01 RX ADMIN — Medication 1 SPRAY(S): at 00:44

## 2024-01-01 RX ADMIN — LIDOCAINE 4 PATCH: 40 CREAM TOPICAL at 16:06

## 2024-01-01 RX ADMIN — Medication 20 UNIT(S): at 11:23

## 2024-01-01 RX ADMIN — Medication 500 MILLIGRAM(S): at 12:23

## 2024-01-01 RX ADMIN — POVIDONE, POLYVINYL ALCOHOL 1 DROP(S): 20; 27 SOLUTION OPHTHALMIC at 11:35

## 2024-01-01 RX ADMIN — Medication 1 SPRAY(S): at 12:05

## 2024-01-01 RX ADMIN — Medication 5 MILLILITER(S): at 11:00

## 2024-01-01 RX ADMIN — TRIAMCINOLONE ACETONIDE 1 APPLICATION(S): 0.15 AEROSOL, SPRAY TOPICAL at 06:36

## 2024-01-01 RX ADMIN — Medication 5 MILLILITER(S): at 23:43

## 2024-01-01 RX ADMIN — IPRATROPIUM BROMIDE AND ALBUTEROL SULFATE 3 MILLILITER(S): 2.5; .5 SOLUTION RESPIRATORY (INHALATION) at 11:53

## 2024-01-01 RX ADMIN — HYDROMORPHONE HYDROCHLORIDE 0.5 MILLIGRAM(S): 2 TABLET ORAL at 06:15

## 2024-01-01 RX ADMIN — Medication 500 MILLIGRAM(S): at 11:33

## 2024-01-01 RX ADMIN — Medication 13 UNIT(S): at 17:45

## 2024-01-01 RX ADMIN — Medication 300 MILLIGRAM(S): at 06:00

## 2024-01-01 RX ADMIN — Medication 125 MILLIGRAM(S): at 00:00

## 2024-01-01 RX ADMIN — Medication 1 TABLET(S): at 14:35

## 2024-01-01 RX ADMIN — PREDNISOLONE ACETATE 1 DROP(S): 1.2 SUSPENSION/ DROPS OPHTHALMIC at 05:44

## 2024-01-01 RX ADMIN — INSULIN GLARGINE 20 UNIT(S): 100 INJECTION, SOLUTION SUBCUTANEOUS at 20:48

## 2024-01-01 RX ADMIN — Medication 1 SPRAY(S): at 11:23

## 2024-01-01 RX ADMIN — Medication 7 UNIT(S): at 12:05

## 2024-01-01 RX ADMIN — INSULIN GLARGINE 10 UNIT(S): 100 INJECTION, SOLUTION SUBCUTANEOUS at 06:40

## 2024-01-01 RX ADMIN — DICLOFENAC SODIUM 2 GRAM(S): 20 SOLUTION TOPICAL at 11:48

## 2024-01-01 RX ADMIN — LIDOCAINE 4 PATCH: 40 CREAM TOPICAL at 19:18

## 2024-01-01 RX ADMIN — Medication 1 APPLICATION(S): at 22:08

## 2024-01-01 RX ADMIN — Medication 2 MILLIGRAM(S): at 17:58

## 2024-01-01 RX ADMIN — Medication 4 MILLIGRAM(S): at 21:15

## 2024-01-01 RX ADMIN — FENTANYL 1 PATCH: 12 PATCH, EXTENDED RELEASE TRANSDERMAL at 19:46

## 2024-01-01 RX ADMIN — PANTOPRAZOLE SODIUM 40 MILLIGRAM(S): 40 TABLET, DELAYED RELEASE ORAL at 18:36

## 2024-01-01 RX ADMIN — Medication 500 MILLIGRAM(S): at 12:15

## 2024-01-01 RX ADMIN — Medication 500 MILLIGRAM(S): at 12:18

## 2024-01-01 RX ADMIN — Medication 1: at 18:31

## 2024-01-01 RX ADMIN — Medication 12.5 MILLIGRAM(S): at 05:39

## 2024-01-01 RX ADMIN — PANTOPRAZOLE SODIUM 40 MILLIGRAM(S): 40 TABLET, DELAYED RELEASE ORAL at 06:07

## 2024-01-01 RX ADMIN — FENTANYL 1 PATCH: 12 PATCH, EXTENDED RELEASE TRANSDERMAL at 21:28

## 2024-01-01 RX ADMIN — PANTOPRAZOLE SODIUM 40 MILLIGRAM(S): 40 TABLET, DELAYED RELEASE ORAL at 18:25

## 2024-01-01 RX ADMIN — Medication 7 UNIT(S): at 00:17

## 2024-01-01 RX ADMIN — WITCH HAZEL 1 APPLICATION(S): 50 SOLUTION RECTAL at 11:42

## 2024-01-01 RX ADMIN — LIDOCAINE 1 PATCH: 40 CREAM TOPICAL at 09:52

## 2024-01-01 RX ADMIN — Medication 1 SPRAY(S): at 05:13

## 2024-01-01 RX ADMIN — IPRATROPIUM BROMIDE AND ALBUTEROL SULFATE 3 MILLILITER(S): 2.5; .5 SOLUTION RESPIRATORY (INHALATION) at 23:23

## 2024-01-01 RX ADMIN — NYSTATIN 1 APPLICATION(S): 100000 POWDER TOPICAL at 14:04

## 2024-01-01 RX ADMIN — IPRATROPIUM BROMIDE AND ALBUTEROL SULFATE 3 MILLILITER(S): 2.5; .5 SOLUTION RESPIRATORY (INHALATION) at 11:45

## 2024-01-01 RX ADMIN — POTASSIUM CHLORIDE 40 MILLIEQUIVALENT(S): 600 TABLET, EXTENDED RELEASE ORAL at 13:16

## 2024-01-01 RX ADMIN — PREDNISOLONE ACETATE 1 DROP(S): 1.2 SUSPENSION/ DROPS OPHTHALMIC at 22:34

## 2024-01-01 RX ADMIN — NYSTATIN 1 APPLICATION(S): 100000 POWDER TOPICAL at 15:04

## 2024-01-01 RX ADMIN — Medication 125 MICROGRAM(S): at 04:29

## 2024-01-01 RX ADMIN — Medication 1 TABLET(S): at 12:04

## 2024-01-01 RX ADMIN — Medication 1 SPRAY(S): at 06:18

## 2024-01-01 RX ADMIN — Medication 5 MILLILITER(S): at 22:26

## 2024-01-01 RX ADMIN — Medication 1 TABLET(S): at 11:18

## 2024-01-01 RX ADMIN — CHLORHEXIDINE GLUCONATE 15 MILLILITER(S): 1.2 RINSE ORAL at 18:07

## 2024-01-01 RX ADMIN — DICLOFENAC SODIUM 2 GRAM(S): 20 SOLUTION TOPICAL at 12:23

## 2024-01-01 RX ADMIN — Medication 125 MICROGRAM(S): at 04:17

## 2024-01-01 RX ADMIN — CEFTOLOZANE AND TAZOBACTAM 33.33 MILLIGRAM(S): 1; .5 INJECTION, POWDER, LYOPHILIZED, FOR SOLUTION INTRAVENOUS at 15:40

## 2024-01-01 RX ADMIN — INSULIN GLARGINE 20 UNIT(S): 100 INJECTION, SOLUTION SUBCUTANEOUS at 22:32

## 2024-01-01 RX ADMIN — Medication 75 MILLILITER(S): at 19:35

## 2024-01-01 RX ADMIN — ACETAMINOPHEN 500MG 650 MILLIGRAM(S): 500 TABLET, COATED ORAL at 16:14

## 2024-01-01 RX ADMIN — Medication 70 MILLILITER(S): at 05:52

## 2024-01-01 RX ADMIN — Medication 125 MICROGRAM(S): at 05:10

## 2024-01-01 RX ADMIN — ACETAMINOPHEN, DIPHENHYDRAMINE HCL, PHENYLEPHRINE HCL 12 MILLIGRAM(S): 325; 25; 5 TABLET ORAL at 21:43

## 2024-01-01 RX ADMIN — DICLOFENAC SODIUM 2 GRAM(S): 20 SOLUTION TOPICAL at 23:27

## 2024-01-01 RX ADMIN — Medication 5 MILLILITER(S): at 23:08

## 2024-01-01 RX ADMIN — CHLORHEXIDINE GLUCONATE 15 MILLILITER(S): 1.2 RINSE ORAL at 18:57

## 2024-01-01 RX ADMIN — Medication 6: at 12:17

## 2024-01-01 RX ADMIN — Medication 1 SPRAY(S): at 17:24

## 2024-01-01 RX ADMIN — CHLORHEXIDINE GLUCONATE 15 MILLILITER(S): 1.2 RINSE ORAL at 04:44

## 2024-01-01 RX ADMIN — Medication 4: at 23:53

## 2024-01-01 RX ADMIN — POVIDONE, POLYVINYL ALCOHOL 1 DROP(S): 20; 27 SOLUTION OPHTHALMIC at 05:41

## 2024-01-01 RX ADMIN — Medication 500 MILLIGRAM(S): at 12:16

## 2024-01-01 RX ADMIN — Medication 1 SPRAY(S): at 12:21

## 2024-01-01 RX ADMIN — Medication 2 MILLIGRAM(S): at 22:01

## 2024-01-01 RX ADMIN — LIDOCAINE 4 PATCH: 40 CREAM TOPICAL at 17:36

## 2024-01-01 RX ADMIN — WITCH HAZEL 1 APPLICATION(S): 50 SOLUTION RECTAL at 18:02

## 2024-01-01 RX ADMIN — ACETAMINOPHEN 500MG 500 MILLIGRAM(S): 500 TABLET, COATED ORAL at 00:46

## 2024-01-01 RX ADMIN — FENTANYL 1 PATCH: 12 PATCH, EXTENDED RELEASE TRANSDERMAL at 07:14

## 2024-01-01 RX ADMIN — PANTOPRAZOLE SODIUM 40 MILLIGRAM(S): 40 TABLET, DELAYED RELEASE ORAL at 17:07

## 2024-01-01 RX ADMIN — POVIDONE, POLYVINYL ALCOHOL 1 DROP(S): 20; 27 SOLUTION OPHTHALMIC at 12:00

## 2024-01-01 RX ADMIN — POVIDONE, POLYVINYL ALCOHOL 1 DROP(S): 20; 27 SOLUTION OPHTHALMIC at 11:26

## 2024-01-01 RX ADMIN — TRIAMCINOLONE ACETONIDE 1 APPLICATION(S): 0.15 AEROSOL, SPRAY TOPICAL at 05:55

## 2024-01-01 RX ADMIN — CHLORHEXIDINE GLUCONATE 1 APPLICATION(S): 1.2 RINSE ORAL at 22:06

## 2024-01-01 RX ADMIN — ACETAMINOPHEN 500MG 650 MILLIGRAM(S): 500 TABLET, COATED ORAL at 05:12

## 2024-01-01 RX ADMIN — ACETAMINOPHEN 500MG 650 MILLIGRAM(S): 500 TABLET, COATED ORAL at 06:12

## 2024-01-01 RX ADMIN — PANTOPRAZOLE SODIUM 40 MILLIGRAM(S): 40 TABLET, DELAYED RELEASE ORAL at 19:30

## 2024-01-01 RX ADMIN — WITCH HAZEL 1 APPLICATION(S): 50 SOLUTION RECTAL at 06:59

## 2024-01-01 RX ADMIN — GABAPENTIN 250 MILLIGRAM(S): 300 CAPSULE ORAL at 23:07

## 2024-01-01 RX ADMIN — DICLOFENAC SODIUM 2 GRAM(S): 20 SOLUTION TOPICAL at 05:28

## 2024-01-01 RX ADMIN — Medication 1 APPLICATION(S): at 23:48

## 2024-01-01 RX ADMIN — Medication 250 MILLIGRAM(S): at 13:16

## 2024-01-01 RX ADMIN — CHLORHEXIDINE GLUCONATE 15 MILLILITER(S): 1.2 RINSE ORAL at 05:48

## 2024-01-01 RX ADMIN — Medication 125 MILLIGRAM(S): at 12:36

## 2024-01-01 RX ADMIN — ACETAMINOPHEN 500MG 400 MILLIGRAM(S): 500 TABLET, COATED ORAL at 21:58

## 2024-01-01 RX ADMIN — Medication 125 MICROGRAM(S): at 03:32

## 2024-01-01 RX ADMIN — Medication 5 UNIT(S): at 02:41

## 2024-01-01 RX ADMIN — IPRATROPIUM BROMIDE AND ALBUTEROL SULFATE 3 MILLILITER(S): 2.5; .5 SOLUTION RESPIRATORY (INHALATION) at 23:33

## 2024-01-01 RX ADMIN — CHLORHEXIDINE GLUCONATE 15 MILLILITER(S): 1.2 RINSE ORAL at 23:11

## 2024-01-01 RX ADMIN — ACETAMINOPHEN 500MG 650 MILLIGRAM(S): 500 TABLET, COATED ORAL at 05:11

## 2024-01-01 RX ADMIN — SODIUM CHLORIDE 1 GRAM(S): 9 INJECTION, SOLUTION INTRAMUSCULAR; INTRAVENOUS; SUBCUTANEOUS at 06:18

## 2024-01-01 RX ADMIN — Medication 4: at 17:58

## 2024-01-01 RX ADMIN — NYSTATIN 1 APPLICATION(S): 100000 POWDER TOPICAL at 05:08

## 2024-01-01 RX ADMIN — CEFEPIME 100 MILLIGRAM(S): 2 INJECTION, POWDER, FOR SOLUTION INTRAVENOUS at 05:07

## 2024-01-01 RX ADMIN — SODIUM CHLORIDE 1 GRAM(S): 9 INJECTION, SOLUTION INTRAMUSCULAR; INTRAVENOUS; SUBCUTANEOUS at 21:49

## 2024-01-01 RX ADMIN — POVIDONE, POLYVINYL ALCOHOL 1 DROP(S): 20; 27 SOLUTION OPHTHALMIC at 18:22

## 2024-01-01 RX ADMIN — PANTOPRAZOLE SODIUM 40 MILLIGRAM(S): 40 TABLET, DELAYED RELEASE ORAL at 17:17

## 2024-01-01 RX ADMIN — POVIDONE, POLYVINYL ALCOHOL 1 DROP(S): 20; 27 SOLUTION OPHTHALMIC at 23:11

## 2024-01-01 RX ADMIN — Medication 10: at 17:56

## 2024-01-01 RX ADMIN — DIPHENHYDRAMINE HYDROCHLORIDE AND LIDOCAINE HYDROCHLORIDE AND ALUMINUM HYDROXIDE AND MAGNESIUM HYDRO 5 MILLILITER(S): KIT at 22:02

## 2024-01-01 RX ADMIN — Medication 1 SPRAY(S): at 11:47

## 2024-01-01 RX ADMIN — METRONIDAZOLE 100 MILLIGRAM(S): 500 TABLET ORAL at 05:48

## 2024-01-01 RX ADMIN — POTASSIUM CHLORIDE 40 MILLIEQUIVALENT(S): 600 TABLET, EXTENDED RELEASE ORAL at 18:05

## 2024-01-01 RX ADMIN — IPRATROPIUM BROMIDE AND ALBUTEROL SULFATE 3 MILLILITER(S): 2.5; .5 SOLUTION RESPIRATORY (INHALATION) at 05:06

## 2024-01-01 RX ADMIN — PANTOPRAZOLE SODIUM 40 MILLIGRAM(S): 40 TABLET, DELAYED RELEASE ORAL at 05:00

## 2024-01-01 RX ADMIN — PREDNISOLONE ACETATE 1 DROP(S): 1.2 SUSPENSION/ DROPS OPHTHALMIC at 12:01

## 2024-01-01 RX ADMIN — Medication 160 MILLIGRAM(S): at 12:06

## 2024-01-01 RX ADMIN — Medication 125 MILLIGRAM(S): at 18:15

## 2024-01-01 RX ADMIN — FENTANYL 1 PATCH: 12 PATCH, EXTENDED RELEASE TRANSDERMAL at 07:19

## 2024-01-01 RX ADMIN — NYSTATIN 1 APPLICATION(S): 100000 POWDER TOPICAL at 15:56

## 2024-01-01 RX ADMIN — Medication 5 MILLILITER(S): at 04:38

## 2024-01-01 RX ADMIN — POTASSIUM CHLORIDE 40 MILLIEQUIVALENT(S): 600 TABLET, EXTENDED RELEASE ORAL at 21:15

## 2024-01-01 RX ADMIN — ESCITALOPRAM OXALATE 10 MILLIGRAM(S): 10 TABLET, FILM COATED ORAL at 08:27

## 2024-01-01 RX ADMIN — METRONIDAZOLE 100 MILLIGRAM(S): 500 TABLET ORAL at 14:34

## 2024-01-01 RX ADMIN — LIDOCAINE 4 PATCH: 40 CREAM TOPICAL at 21:55

## 2024-01-01 RX ADMIN — Medication 400 MILLIGRAM(S): at 06:33

## 2024-01-01 RX ADMIN — Medication 250 MILLIGRAM(S): at 06:34

## 2024-01-01 RX ADMIN — Medication 2: at 18:10

## 2024-01-01 RX ADMIN — Medication 1 SPRAY(S): at 00:08

## 2024-01-01 RX ADMIN — INSULIN GLARGINE 14 UNIT(S): 100 INJECTION, SOLUTION SUBCUTANEOUS at 06:02

## 2024-01-01 RX ADMIN — Medication 1 APPLICATION(S): at 21:28

## 2024-01-01 RX ADMIN — NYSTATIN 1 APPLICATION(S): 100000 POWDER TOPICAL at 21:19

## 2024-01-01 RX ADMIN — METRONIDAZOLE 100 MILLIGRAM(S): 500 TABLET ORAL at 15:12

## 2024-01-01 RX ADMIN — Medication 12.5 MILLIGRAM(S): at 05:42

## 2024-01-01 RX ADMIN — Medication 125 MICROGRAM(S): at 05:40

## 2024-01-01 RX ADMIN — DICLOFENAC SODIUM 2 GRAM(S): 20 SOLUTION TOPICAL at 12:05

## 2024-01-01 RX ADMIN — Medication 5 MILLILITER(S): at 05:15

## 2024-01-01 RX ADMIN — Medication 5 MILLILITER(S): at 00:51

## 2024-01-01 RX ADMIN — LIDOCAINE 4 PATCH: 40 CREAM TOPICAL at 05:00

## 2024-01-01 RX ADMIN — SODIUM CHLORIDE 1 GRAM(S): 9 INJECTION, SOLUTION INTRAMUSCULAR; INTRAVENOUS; SUBCUTANEOUS at 18:43

## 2024-01-01 RX ADMIN — Medication 300 MILLIGRAM(S): at 07:27

## 2024-01-01 RX ADMIN — Medication 25 GRAM(S): at 11:45

## 2024-01-01 NOTE — PROGRESS NOTE ADULT - SUBJECTIVE AND OBJECTIVE BOX
Patient is a 73y old  Female who presents with a chief complaint of PEG Bleeding (22 Dec 2023 07:35)    HPI:  72 y/o F with PMHx of T2DM, HTN, HLD, anemia, hypothyroidism, RA, fibromyalgia, remote cerebral aneurysm repair, acute hypercapnic respiratory failure now with tracheostomy, PEG tube, and bedbound (trach change 8/18, PEG change 8/14), sacral pressure ulcer, BIBEMS from home for 6 day hx of bleeding from the tracheostomy and PEG tube with associated abdominal pain. Patient still having regular bowel movements, last one occurred prior to ED arrival. Was seen outpatient on 10/26 by critical care Dr. Zaki Gottlieb and apparently had cultures sent at that time. Patient also noted to have chronic sacral decubitous ulcer. Family endorsed one episode of fever, though was not febrile on evaluation. Daughter noted patient was recently experiencing auditory and visual hallucinations (seeing animals that were not there & hearing a "bomb" going off outside her home), though patient not actively hallucinating on evaluation.  ED course notable for CT neck imaging showing no evidence of active bleeding around the tracheostomy site, no hematomas, and no drainable collection around the tracheostomy site. CT abdomen/pelvis notable for gastrostomy tube localized to the stomach with mild thickening noted along the tract; a sacral decubitus ulcer extending to the coccyx with osseous remodeling, possibly representing osteomyelitis; and bibasilar patchy opacities with volume loss in the lungs, representing atelectasis vs infection. Patient admitted for respiratory support, wound care of tracheostomy and PEG, and management of presumed sacral osteomyelitis.   (28 Oct 2023 04:18)    Interval Events:    REVIEW OF SYSTEMS:  [ ] Positive  [ ] All other systems negative  [ ] Unable to assess ROS because ________    Vital Signs Last 24 Hrs  T(C): 36.9 (01-01-24 @ 04:49), Max: 36.9 (01-01-24 @ 04:49)  T(F): 98.5 (01-01-24 @ 04:49), Max: 98.5 (01-01-24 @ 04:49)  HR: 90 (01-01-24 @ 05:38) (81 - 102)  BP: 133/54 (01-01-24 @ 04:49) (119/62 - 143/62)  RR: 16 (01-01-24 @ 04:49) (16 - 19)  SpO2: 100% (01-01-24 @ 05:38) (98% - 100%)    PHYSICAL EXAM:  HEENT:   [ ]Tracheostomy:  [ ]Pupils equal  [ ]No oral lesions  [ ]Abnormal    SKIN  [ ] No Rash  [ ] Abnormal  [ ] pressure    CARDIAC  [ ]Regular  [ ]Abnormal    PULMONARY  [ ]Bilateral Clear Breath Sounds  [ ]Normal Excursion  [ ]Abnormal    GI  [ ]PEG      [ ] +BS		              [ ]Soft, nondistended, nontender	  [ ]Abnormal    MUSCULOSKELETAL                                   [ ]Bedbound                 [ ]Abnormal    [ ]Ambulatory/OOB to chair                           EXTREMITIES                                         [ ]Normal  [ ]Edema                           NEUROLOGIC  [ ] Normal, non focal  [ ] Focal findings:    PSYCHIATRIC  [ ]Alert and appropriate  [ ] Sedated	 [ ]Agitated    :  Mcbride: [ ] Yes, if yes: Date of Placement:                   [  ] No    LINES: Central Lines [ ] Yes, if yes: Date of Placement                                     [  ] No    HOSPITAL MEDICATIONS:  MEDICATIONS  (STANDING):  albuterol/ipratropium for Nebulization 3 milliLiter(s) Nebulizer every 6 hours  amLODIPine   Tablet 10 milliGRAM(s) Oral daily  artificial  tears Solution 2 Drop(s) Both EYES four times a day  ascorbic acid 500 milliGRAM(s) Oral daily  calcium carbonate    500 mG (Tums) Chewable 1 Tablet(s) Chew every 12 hours  chlorhexidine 0.12% Liquid 15 milliLiter(s) Oral Mucosa every 12 hours  chlorhexidine 4% Liquid 1 Application(s) Topical <User Schedule>  dextrose 50% Injectable 25 Gram(s) IV Push once  dextrose 50% Injectable 12.5 Gram(s) IV Push once  escitalopram 10 milliGRAM(s) Oral <User Schedule>  fentaNYL   Patch  25 MICROgram(s)/Hr 1 Patch Transdermal every 72 hours  gabapentin Solution 100 milliGRAM(s) Enteral Tube at bedtime  glucagon  Injectable 1 milliGRAM(s) IntraMuscular once  hemorrhoidal Ointment      hemorrhoidal Ointment 1 Application(s) Rectal daily  insulin glargine Injectable (LANTUS) 19 Unit(s) SubCutaneous every morning  insulin lispro (ADMELOG) corrective regimen sliding scale   SubCutaneous every 6 hours  insulin regular  human recombinant 32 Unit(s) SubCutaneous <User Schedule>  insulin regular  human recombinant 15 Unit(s) SubCutaneous <User Schedule>  insulin regular  human recombinant 9 Unit(s) SubCutaneous <User Schedule>  levothyroxine 125 MICROGram(s) Oral <User Schedule>  lidocaine   4% Patch 1 Patch Transdermal daily  lidocaine   4% Patch 1 Patch Transdermal daily  lidocaine 2% Viscous 5 milliLiter(s) Mucosal two times a day  melatonin 5 milliGRAM(s) Oral at bedtime  melatonin 1 milliGRAM(s) Oral at bedtime  multivitamin/minerals/iron Oral Solution (CENTRUM) 15 milliLiter(s) Oral daily  nystatin Powder 1 Application(s) Topical every 8 hours  pantoprazole   Suspension 40 milliGRAM(s) Enteral Tube <User Schedule>  petrolatum Ophthalmic Ointment 1 Application(s) Both EYES four times a day  predniSONE   Tablet 5 milliGRAM(s) Oral daily  QUEtiapine 12.5 milliGRAM(s) Oral <User Schedule>  silver nitrate Applicator 1 Application(s) Topical once  simethicone 80 milliGRAM(s) Chew four times a day  zinc oxide 40% Paste 1 Application(s) Topical daily    MEDICATIONS  (PRN):  acetaminophen   Oral Liquid .. 1000 milliGRAM(s) Enteral Tube every 6 hours PRN Temp greater or equal to 38C (100.4F), Mild Pain (1 - 3)  benzocaine 20% Spray 1 Spray(s) Topical four times a day PRN Cough  diclofenac sodium 1% Gel 2 Gram(s) Topical four times a day PRN pain  diphenhydrAMINE Elixir 25 milliGRAM(s) Oral every 6 hours PRN Rash and/or Itching  guaifenesin/dextromethorphan Oral Liquid 10 milliLiter(s) Oral every 6 hours PRN Cough  oxyCODONE    Solution 2.5 milliGRAM(s) Oral every 6 hours PRN Moderate Pain (4 - 6)  sodium chloride 0.65% Nasal 1 Spray(s) Both Nostrils every 6 hours PRN Nasal Congestion      Mode: AC/ CMV (Assist Control/ Continuous Mandatory Ventilation)  RR (machine): 12  TV (machine): 300  FiO2: 30  PEEP: 5  ITime: 0.74  MAP: 9  PIP: 25     Patient is a 73y old  Female who presents with a chief complaint of PEG Bleeding (22 Dec 2023 07:35)    HPI:  74 y/o F with PMHx of T2DM, HTN, HLD, anemia, hypothyroidism, RA, fibromyalgia, remote cerebral aneurysm repair, acute hypercapnic respiratory failure now with tracheostomy, PEG tube, and bedbound (trach change 8/18, PEG change 8/14), sacral pressure ulcer, BIBEMS from home for 6 day hx of bleeding from the tracheostomy and PEG tube with associated abdominal pain. Patient still having regular bowel movements, last one occurred prior to ED arrival. Was seen outpatient on 10/26 by critical care Dr. Zaki Gottlieb and apparently had cultures sent at that time. Patient also noted to have chronic sacral decubitous ulcer. Family endorsed one episode of fever, though was not febrile on evaluation. Daughter noted patient was recently experiencing auditory and visual hallucinations (seeing animals that were not there & hearing a "bomb" going off outside her home), though patient not actively hallucinating on evaluation.  ED course notable for CT neck imaging showing no evidence of active bleeding around the tracheostomy site, no hematomas, and no drainable collection around the tracheostomy site. CT abdomen/pelvis notable for gastrostomy tube localized to the stomach with mild thickening noted along the tract; a sacral decubitus ulcer extending to the coccyx with osseous remodeling, possibly representing osteomyelitis; and bibasilar patchy opacities with volume loss in the lungs, representing atelectasis vs infection. Patient admitted for respiratory support, wound care of tracheostomy and PEG, and management of presumed sacral osteomyelitis.   (28 Oct 2023 04:18)    Interval Events:    REVIEW OF SYSTEMS:  [ ] Positive  [ ] All other systems negative  [ ] Unable to assess ROS because ________    Vital Signs Last 24 Hrs  T(C): 36.9 (01-01-24 @ 04:49), Max: 36.9 (01-01-24 @ 04:49)  T(F): 98.5 (01-01-24 @ 04:49), Max: 98.5 (01-01-24 @ 04:49)  HR: 90 (01-01-24 @ 05:38) (81 - 102)  BP: 133/54 (01-01-24 @ 04:49) (119/62 - 143/62)  RR: 16 (01-01-24 @ 04:49) (16 - 19)  SpO2: 100% (01-01-24 @ 05:38) (98% - 100%)    PHYSICAL EXAM:  HEENT:   [ ]Tracheostomy:  [ ]Pupils equal  [ ]No oral lesions  [ ]Abnormal    SKIN  [ ] No Rash  [ ] Abnormal  [ ] pressure    CARDIAC  [ ]Regular  [ ]Abnormal    PULMONARY  [ ]Bilateral Clear Breath Sounds  [ ]Normal Excursion  [ ]Abnormal    GI  [ ]PEG      [ ] +BS		              [ ]Soft, nondistended, nontender	  [ ]Abnormal    MUSCULOSKELETAL                                   [ ]Bedbound                 [ ]Abnormal    [ ]Ambulatory/OOB to chair                           EXTREMITIES                                         [ ]Normal  [ ]Edema                           NEUROLOGIC  [ ] Normal, non focal  [ ] Focal findings:    PSYCHIATRIC  [ ]Alert and appropriate  [ ] Sedated	 [ ]Agitated    :  Mcbride: [ ] Yes, if yes: Date of Placement:                   [  ] No    LINES: Central Lines [ ] Yes, if yes: Date of Placement                                     [  ] No    HOSPITAL MEDICATIONS:  MEDICATIONS  (STANDING):  albuterol/ipratropium for Nebulization 3 milliLiter(s) Nebulizer every 6 hours  amLODIPine   Tablet 10 milliGRAM(s) Oral daily  artificial  tears Solution 2 Drop(s) Both EYES four times a day  ascorbic acid 500 milliGRAM(s) Oral daily  calcium carbonate    500 mG (Tums) Chewable 1 Tablet(s) Chew every 12 hours  chlorhexidine 0.12% Liquid 15 milliLiter(s) Oral Mucosa every 12 hours  chlorhexidine 4% Liquid 1 Application(s) Topical <User Schedule>  dextrose 50% Injectable 25 Gram(s) IV Push once  dextrose 50% Injectable 12.5 Gram(s) IV Push once  escitalopram 10 milliGRAM(s) Oral <User Schedule>  fentaNYL   Patch  25 MICROgram(s)/Hr 1 Patch Transdermal every 72 hours  gabapentin Solution 100 milliGRAM(s) Enteral Tube at bedtime  glucagon  Injectable 1 milliGRAM(s) IntraMuscular once  hemorrhoidal Ointment      hemorrhoidal Ointment 1 Application(s) Rectal daily  insulin glargine Injectable (LANTUS) 19 Unit(s) SubCutaneous every morning  insulin lispro (ADMELOG) corrective regimen sliding scale   SubCutaneous every 6 hours  insulin regular  human recombinant 32 Unit(s) SubCutaneous <User Schedule>  insulin regular  human recombinant 15 Unit(s) SubCutaneous <User Schedule>  insulin regular  human recombinant 9 Unit(s) SubCutaneous <User Schedule>  levothyroxine 125 MICROGram(s) Oral <User Schedule>  lidocaine   4% Patch 1 Patch Transdermal daily  lidocaine   4% Patch 1 Patch Transdermal daily  lidocaine 2% Viscous 5 milliLiter(s) Mucosal two times a day  melatonin 5 milliGRAM(s) Oral at bedtime  melatonin 1 milliGRAM(s) Oral at bedtime  multivitamin/minerals/iron Oral Solution (CENTRUM) 15 milliLiter(s) Oral daily  nystatin Powder 1 Application(s) Topical every 8 hours  pantoprazole   Suspension 40 milliGRAM(s) Enteral Tube <User Schedule>  petrolatum Ophthalmic Ointment 1 Application(s) Both EYES four times a day  predniSONE   Tablet 5 milliGRAM(s) Oral daily  QUEtiapine 12.5 milliGRAM(s) Oral <User Schedule>  silver nitrate Applicator 1 Application(s) Topical once  simethicone 80 milliGRAM(s) Chew four times a day  zinc oxide 40% Paste 1 Application(s) Topical daily    MEDICATIONS  (PRN):  acetaminophen   Oral Liquid .. 1000 milliGRAM(s) Enteral Tube every 6 hours PRN Temp greater or equal to 38C (100.4F), Mild Pain (1 - 3)  benzocaine 20% Spray 1 Spray(s) Topical four times a day PRN Cough  diclofenac sodium 1% Gel 2 Gram(s) Topical four times a day PRN pain  diphenhydrAMINE Elixir 25 milliGRAM(s) Oral every 6 hours PRN Rash and/or Itching  guaifenesin/dextromethorphan Oral Liquid 10 milliLiter(s) Oral every 6 hours PRN Cough  oxyCODONE    Solution 2.5 milliGRAM(s) Oral every 6 hours PRN Moderate Pain (4 - 6)  sodium chloride 0.65% Nasal 1 Spray(s) Both Nostrils every 6 hours PRN Nasal Congestion      Mode: AC/ CMV (Assist Control/ Continuous Mandatory Ventilation)  RR (machine): 12  TV (machine): 300  FiO2: 30  PEEP: 5  ITime: 0.74  MAP: 9  PIP: 25     Patient is a 73y old  Female who presents with a chief complaint of PEG Bleeding (22 Dec 2023 07:35)    HPI:  74 y/o F with PMHx of T2DM, HTN, HLD, anemia, hypothyroidism, RA, fibromyalgia, remote cerebral aneurysm repair, acute hypercapnic respiratory failure now with tracheostomy, PEG tube, and bedbound (trach change 8/18, PEG change 8/14), sacral pressure ulcer, BIBEMS from home for 6 day hx of bleeding from the tracheostomy and PEG tube with associated abdominal pain. Patient still having regular bowel movements, last one occurred prior to ED arrival. Was seen outpatient on 10/26 by critical care Dr. Zaki Gottlieb and apparently had cultures sent at that time. Patient also noted to have chronic sacral decubitous ulcer. Family endorsed one episode of fever, though was not febrile on evaluation. Daughter noted patient was recently experiencing auditory and visual hallucinations (seeing animals that were not there & hearing a "bomb" going off outside her home), though patient not actively hallucinating on evaluation.  ED course notable for CT neck imaging showing no evidence of active bleeding around the tracheostomy site, no hematomas, and no drainable collection around the tracheostomy site. CT abdomen/pelvis notable for gastrostomy tube localized to the stomach with mild thickening noted along the tract; a sacral decubitus ulcer extending to the coccyx with osseous remodeling, possibly representing osteomyelitis; and bibasilar patchy opacities with volume loss in the lungs, representing atelectasis vs infection. Patient admitted for respiratory support, wound care of tracheostomy and PEG, and management of presumed sacral osteomyelitis.   (28 Oct 2023 04:18)    Interval Events: No issues overnight    REVIEW OF SYSTEMS:  [ ] Positive  [x ] All other systems negative  [ ] Unable to assess ROS because ________    Vital Signs Last 24 Hrs  T(C): 36.9 (01-01-24 @ 04:49), Max: 36.9 (01-01-24 @ 04:49)  T(F): 98.5 (01-01-24 @ 04:49), Max: 98.5 (01-01-24 @ 04:49)  HR: 90 (01-01-24 @ 05:38) (81 - 102)  BP: 133/54 (01-01-24 @ 04:49) (119/62 - 143/62)  RR: 16 (01-01-24 @ 04:49) (16 - 19)  SpO2: 100% (01-01-24 @ 05:38) (98% - 100%)    PHYSICAL EXAM:    HEENT:   [X] Tracheostomy: #8 distal XLT Cuffed Shiley  [X] PERRL B/L; EOMI  [ ]No oral lesions  [X] Abnormal: missing; +loose bottom teeth, poor dentition     SKIN  [ ]No Rash  [ ] Abnormal  [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    CARDIAC  [X] Regular  [ ] Abnormal    PULMONARY  [ ] Bilateral Clear Breath Sounds  [ ] Normal Excursion  [X] Abnormal - Minimal Coarse BS bilaterally     GI  [X] PEG site c/d/i, no leakage noted      [X] +BS		              [X] Soft, nondistended, nontender	  [ ] Abnormal    MUSCULOSKELETAL                                   [X] Bedbound                 [ ] Abnormal    [ ] Ambulatory/OOB to chair                           EXTREMITIES                                         [X] Normal  [ ]Edema                           NEUROLOGIC  [ ] Normal, non focal  [X] Focal findings: awake and arousable to voice, follows commands on all 4 extremities; limited mobility of legs with partial B/L foot drop    PSYCHIATRIC  [X]Alert and appropriate  [ ] Sedated	 [ ]Agitated    :  Mcbride: [ ] Yes, if yes: Date of Placement:                   [X] No    LINES: Central Lines [ ] Yes, if yes: Date of Placement                                     [X] No    HOSPITAL MEDICATIONS:  MEDICATIONS  (STANDING):  albuterol/ipratropium for Nebulization 3 milliLiter(s) Nebulizer every 6 hours  amLODIPine   Tablet 10 milliGRAM(s) Oral daily  artificial  tears Solution 2 Drop(s) Both EYES four times a day  ascorbic acid 500 milliGRAM(s) Oral daily  calcium carbonate    500 mG (Tums) Chewable 1 Tablet(s) Chew every 12 hours  chlorhexidine 0.12% Liquid 15 milliLiter(s) Oral Mucosa every 12 hours  chlorhexidine 4% Liquid 1 Application(s) Topical <User Schedule>  dextrose 50% Injectable 25 Gram(s) IV Push once  dextrose 50% Injectable 12.5 Gram(s) IV Push once  escitalopram 10 milliGRAM(s) Oral <User Schedule>  fentaNYL   Patch  25 MICROgram(s)/Hr 1 Patch Transdermal every 72 hours  gabapentin Solution 100 milliGRAM(s) Enteral Tube at bedtime  glucagon  Injectable 1 milliGRAM(s) IntraMuscular once  hemorrhoidal Ointment      hemorrhoidal Ointment 1 Application(s) Rectal daily  insulin glargine Injectable (LANTUS) 19 Unit(s) SubCutaneous every morning  insulin lispro (ADMELOG) corrective regimen sliding scale   SubCutaneous every 6 hours  insulin regular  human recombinant 32 Unit(s) SubCutaneous <User Schedule>  insulin regular  human recombinant 15 Unit(s) SubCutaneous <User Schedule>  insulin regular  human recombinant 9 Unit(s) SubCutaneous <User Schedule>  levothyroxine 125 MICROGram(s) Oral <User Schedule>  lidocaine   4% Patch 1 Patch Transdermal daily  lidocaine   4% Patch 1 Patch Transdermal daily  lidocaine 2% Viscous 5 milliLiter(s) Mucosal two times a day  melatonin 5 milliGRAM(s) Oral at bedtime  melatonin 1 milliGRAM(s) Oral at bedtime  multivitamin/minerals/iron Oral Solution (CENTRUM) 15 milliLiter(s) Oral daily  nystatin Powder 1 Application(s) Topical every 8 hours  pantoprazole   Suspension 40 milliGRAM(s) Enteral Tube <User Schedule>  petrolatum Ophthalmic Ointment 1 Application(s) Both EYES four times a day  predniSONE   Tablet 5 milliGRAM(s) Oral daily  QUEtiapine 12.5 milliGRAM(s) Oral <User Schedule>  silver nitrate Applicator 1 Application(s) Topical once  simethicone 80 milliGRAM(s) Chew four times a day  zinc oxide 40% Paste 1 Application(s) Topical daily    MEDICATIONS  (PRN):  acetaminophen   Oral Liquid .. 1000 milliGRAM(s) Enteral Tube every 6 hours PRN Temp greater or equal to 38C (100.4F), Mild Pain (1 - 3)  benzocaine 20% Spray 1 Spray(s) Topical four times a day PRN Cough  diclofenac sodium 1% Gel 2 Gram(s) Topical four times a day PRN pain  diphenhydrAMINE Elixir 25 milliGRAM(s) Oral every 6 hours PRN Rash and/or Itching  guaifenesin/dextromethorphan Oral Liquid 10 milliLiter(s) Oral every 6 hours PRN Cough  oxyCODONE    Solution 2.5 milliGRAM(s) Oral every 6 hours PRN Moderate Pain (4 - 6)  sodium chloride 0.65% Nasal 1 Spray(s) Both Nostrils every 6 hours PRN Nasal Congestion      Mode: AC/ CMV (Assist Control/ Continuous Mandatory Ventilation)  RR (machine): 12  TV (machine): 300  FiO2: 30  PEEP: 5  ITime: 0.74  MAP: 9  PIP: 25     Patient is a 73y old  Female who presents with a chief complaint of PEG Bleeding (22 Dec 2023 07:35)    HPI:  72 y/o F with PMHx of T2DM, HTN, HLD, anemia, hypothyroidism, RA, fibromyalgia, remote cerebral aneurysm repair, acute hypercapnic respiratory failure now with tracheostomy, PEG tube, and bedbound (trach change 8/18, PEG change 8/14), sacral pressure ulcer, BIBEMS from home for 6 day hx of bleeding from the tracheostomy and PEG tube with associated abdominal pain. Patient still having regular bowel movements, last one occurred prior to ED arrival. Was seen outpatient on 10/26 by critical care Dr. Zaki Gottlieb and apparently had cultures sent at that time. Patient also noted to have chronic sacral decubitous ulcer. Family endorsed one episode of fever, though was not febrile on evaluation. Daughter noted patient was recently experiencing auditory and visual hallucinations (seeing animals that were not there & hearing a "bomb" going off outside her home), though patient not actively hallucinating on evaluation.  ED course notable for CT neck imaging showing no evidence of active bleeding around the tracheostomy site, no hematomas, and no drainable collection around the tracheostomy site. CT abdomen/pelvis notable for gastrostomy tube localized to the stomach with mild thickening noted along the tract; a sacral decubitus ulcer extending to the coccyx with osseous remodeling, possibly representing osteomyelitis; and bibasilar patchy opacities with volume loss in the lungs, representing atelectasis vs infection. Patient admitted for respiratory support, wound care of tracheostomy and PEG, and management of presumed sacral osteomyelitis.   (28 Oct 2023 04:18)    Interval Events: No issues overnight    REVIEW OF SYSTEMS:  [ ] Positive  [x ] All other systems negative  [ ] Unable to assess ROS because ________    Vital Signs Last 24 Hrs  T(C): 36.9 (01-01-24 @ 04:49), Max: 36.9 (01-01-24 @ 04:49)  T(F): 98.5 (01-01-24 @ 04:49), Max: 98.5 (01-01-24 @ 04:49)  HR: 90 (01-01-24 @ 05:38) (81 - 102)  BP: 133/54 (01-01-24 @ 04:49) (119/62 - 143/62)  RR: 16 (01-01-24 @ 04:49) (16 - 19)  SpO2: 100% (01-01-24 @ 05:38) (98% - 100%)    PHYSICAL EXAM:    HEENT:   [X] Tracheostomy: #8 distal XLT Cuffed Shiley  [X] PERRL B/L; EOMI  [ ]No oral lesions  [X] Abnormal: missing; +loose bottom teeth, poor dentition     SKIN  [ ]No Rash  [ ] Abnormal  [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    CARDIAC  [X] Regular  [ ] Abnormal    PULMONARY  [ ] Bilateral Clear Breath Sounds  [ ] Normal Excursion  [X] Abnormal - Minimal Coarse BS bilaterally     GI  [X] PEG site c/d/i, no leakage noted      [X] +BS		              [X] Soft, nondistended, nontender	  [ ] Abnormal    MUSCULOSKELETAL                                   [X] Bedbound                 [ ] Abnormal    [ ] Ambulatory/OOB to chair                           EXTREMITIES                                         [X] Normal  [ ]Edema                           NEUROLOGIC  [ ] Normal, non focal  [X] Focal findings: awake and arousable to voice, follows commands on all 4 extremities; limited mobility of legs with partial B/L foot drop    PSYCHIATRIC  [X]Alert and appropriate  [ ] Sedated	 [ ]Agitated    :  Mcbride: [ ] Yes, if yes: Date of Placement:                   [X] No    LINES: Central Lines [ ] Yes, if yes: Date of Placement                                     [X] No    HOSPITAL MEDICATIONS:  MEDICATIONS  (STANDING):  albuterol/ipratropium for Nebulization 3 milliLiter(s) Nebulizer every 6 hours  amLODIPine   Tablet 10 milliGRAM(s) Oral daily  artificial  tears Solution 2 Drop(s) Both EYES four times a day  ascorbic acid 500 milliGRAM(s) Oral daily  calcium carbonate    500 mG (Tums) Chewable 1 Tablet(s) Chew every 12 hours  chlorhexidine 0.12% Liquid 15 milliLiter(s) Oral Mucosa every 12 hours  chlorhexidine 4% Liquid 1 Application(s) Topical <User Schedule>  dextrose 50% Injectable 25 Gram(s) IV Push once  dextrose 50% Injectable 12.5 Gram(s) IV Push once  escitalopram 10 milliGRAM(s) Oral <User Schedule>  fentaNYL   Patch  25 MICROgram(s)/Hr 1 Patch Transdermal every 72 hours  gabapentin Solution 100 milliGRAM(s) Enteral Tube at bedtime  glucagon  Injectable 1 milliGRAM(s) IntraMuscular once  hemorrhoidal Ointment      hemorrhoidal Ointment 1 Application(s) Rectal daily  insulin glargine Injectable (LANTUS) 19 Unit(s) SubCutaneous every morning  insulin lispro (ADMELOG) corrective regimen sliding scale   SubCutaneous every 6 hours  insulin regular  human recombinant 32 Unit(s) SubCutaneous <User Schedule>  insulin regular  human recombinant 15 Unit(s) SubCutaneous <User Schedule>  insulin regular  human recombinant 9 Unit(s) SubCutaneous <User Schedule>  levothyroxine 125 MICROGram(s) Oral <User Schedule>  lidocaine   4% Patch 1 Patch Transdermal daily  lidocaine   4% Patch 1 Patch Transdermal daily  lidocaine 2% Viscous 5 milliLiter(s) Mucosal two times a day  melatonin 5 milliGRAM(s) Oral at bedtime  melatonin 1 milliGRAM(s) Oral at bedtime  multivitamin/minerals/iron Oral Solution (CENTRUM) 15 milliLiter(s) Oral daily  nystatin Powder 1 Application(s) Topical every 8 hours  pantoprazole   Suspension 40 milliGRAM(s) Enteral Tube <User Schedule>  petrolatum Ophthalmic Ointment 1 Application(s) Both EYES four times a day  predniSONE   Tablet 5 milliGRAM(s) Oral daily  QUEtiapine 12.5 milliGRAM(s) Oral <User Schedule>  silver nitrate Applicator 1 Application(s) Topical once  simethicone 80 milliGRAM(s) Chew four times a day  zinc oxide 40% Paste 1 Application(s) Topical daily    MEDICATIONS  (PRN):  acetaminophen   Oral Liquid .. 1000 milliGRAM(s) Enteral Tube every 6 hours PRN Temp greater or equal to 38C (100.4F), Mild Pain (1 - 3)  benzocaine 20% Spray 1 Spray(s) Topical four times a day PRN Cough  diclofenac sodium 1% Gel 2 Gram(s) Topical four times a day PRN pain  diphenhydrAMINE Elixir 25 milliGRAM(s) Oral every 6 hours PRN Rash and/or Itching  guaifenesin/dextromethorphan Oral Liquid 10 milliLiter(s) Oral every 6 hours PRN Cough  oxyCODONE    Solution 2.5 milliGRAM(s) Oral every 6 hours PRN Moderate Pain (4 - 6)  sodium chloride 0.65% Nasal 1 Spray(s) Both Nostrils every 6 hours PRN Nasal Congestion      Mode: AC/ CMV (Assist Control/ Continuous Mandatory Ventilation)  RR (machine): 12  TV (machine): 300  FiO2: 30  PEEP: 5  ITime: 0.74  MAP: 9  PIP: 25

## 2024-01-01 NOTE — PROGRESS NOTE ADULT - ASSESSMENT
74 y/o F with PMHx of T2DM, HTN, HLD, anemia, hypothyroidism, RA, fibromyalgia, remote cerebral aneurysm repair, acute hypercapnic respiratory failure now with tracheostomy, PEG tube, and bedbound (trach change 8/18, PEG change 8/14), sacral pressure ulcer, BIBEMS from home for 6 day hx of bleeding from the tracheostomy and PEG tube with associated abdominal pain, recent history of auditory and visual hallucinations, and with chronic sacral decubitus ulcer. Exam notable for mild abdominal distention with LLQ and RLQ tenderness, tracheostomy in place with no obvious bleeding, PEG tube with scant amount of dried blood, at baseline neurologic status. Imaging showing no active bleeding around tracheostomy site, however was notable for mild thickening along PEG tube tract, sacral ulcer extending to the coccyx suggestive of possible osteomyelitis, and bibasilar patchy lung opacities with volume loss. Patient admitted for respiratory support, wound care of tracheostomy and PEG, and management of sacral osteomyelitis. No further Trach and peg site bleeding noted since admission.  Pelvic MRI imaging also suggestive of Acute OM. Patient placed on long-term antibiotics with Infectious disease guidance.  Additionally treated with a 7d course of Cefepime due to PSA and Serratia tracheitis on 11/8-->11/15 and then resumed cipro/keflex.  Hospital course c/b Delirium for which Seroquel was added and home Lexapro continued. Tracheostomy changed to #9 Cuffed [place] by ENT 11/3.  Despite trach change patient remained with persistent air leak.  On 11/19, the trach was again changed due to leak and loss of volumes on vent.  Trach was changed to #8 distal cuffed [place].  Patient seen by Dermatology for back lesions.  One diagnosed as a seborrheic keratitis and the other a dermatofibroma.  Intermittent abdominal pain throughout hospital course, at times relieved with gas/BM, w/u negative, seen by GI - no recs.  12/10 pt completed cipro and keflex for osteo treatment.  12/13 moderate amounts of secretions w/ blood - tranexamic acid neb given with notable improvement.  12/18 with blood tinged secretion again - TXA 250mg neb x2 doses.  12/19 spiked fever to 102 - pancultured, negative w/u.    12/30-12/31:  No acute events. Remains medically stable, DC planning in progress.  72 y/o F with PMHx of T2DM, HTN, HLD, anemia, hypothyroidism, RA, fibromyalgia, remote cerebral aneurysm repair, acute hypercapnic respiratory failure now with tracheostomy, PEG tube, and bedbound (trach change 8/18, PEG change 8/14), sacral pressure ulcer, BIBEMS from home for 6 day hx of bleeding from the tracheostomy and PEG tube with associated abdominal pain, recent history of auditory and visual hallucinations, and with chronic sacral decubitus ulcer. Exam notable for mild abdominal distention with LLQ and RLQ tenderness, tracheostomy in place with no obvious bleeding, PEG tube with scant amount of dried blood, at baseline neurologic status. Imaging showing no active bleeding around tracheostomy site, however was notable for mild thickening along PEG tube tract, sacral ulcer extending to the coccyx suggestive of possible osteomyelitis, and bibasilar patchy lung opacities with volume loss. Patient admitted for respiratory support, wound care of tracheostomy and PEG, and management of sacral osteomyelitis. No further Trach and peg site bleeding noted since admission.  Pelvic MRI imaging also suggestive of Acute OM. Patient placed on long-term antibiotics with Infectious disease guidance.  Additionally treated with a 7d course of Cefepime due to PSA and Serratia tracheitis on 11/8-->11/15 and then resumed cipro/keflex.  Hospital course c/b Delirium for which Seroquel was added and home Lexapro continued. Tracheostomy changed to #9 Cuffed [place] by ENT 11/3.  Despite trach change patient remained with persistent air leak.  On 11/19, the trach was again changed due to leak and loss of volumes on vent.  Trach was changed to #8 distal cuffed [place].  Patient seen by Dermatology for back lesions.  One diagnosed as a seborrheic keratitis and the other a dermatofibroma.  Intermittent abdominal pain throughout hospital course, at times relieved with gas/BM, w/u negative, seen by GI - no recs.  12/10 pt completed cipro and keflex for osteo treatment.  12/13 moderate amounts of secretions w/ blood - tranexamic acid neb given with notable improvement.  12/18 with blood tinged secretion again - TXA 250mg neb x2 doses.  12/19 spiked fever to 102 - pancultured, negative w/u.    12/30-12/31:  No acute events. Remains medically stable, DC planning in progress.  74 y/o F with PMHx of T2DM, HTN, HLD, anemia, hypothyroidism, RA, fibromyalgia, remote cerebral aneurysm repair, acute hypercapnic respiratory failure now with tracheostomy, PEG tube, and bedbound (trach change 8/18, PEG change 8/14), sacral pressure ulcer, BIBEMS from home for 6 day hx of bleeding from the tracheostomy and PEG tube with associated abdominal pain, recent history of auditory and visual hallucinations, and with chronic sacral decubitus ulcer. Exam notable for mild abdominal distention with LLQ and RLQ tenderness, tracheostomy in place with no obvious bleeding, PEG tube with scant amount of dried blood, at baseline neurologic status. Imaging showing no active bleeding around tracheostomy site, however was notable for mild thickening along PEG tube tract, sacral ulcer extending to the coccyx suggestive of possible osteomyelitis, and bibasilar patchy lung opacities with volume loss. Patient admitted for respiratory support, wound care of tracheostomy and PEG, and management of sacral osteomyelitis. No further Trach and peg site bleeding noted since admission.  Pelvic MRI imaging also suggestive of Acute OM. Patient placed on long-term antibiotics with Infectious disease guidance.  Additionally treated with a 7d course of Cefepime due to PSA and Serratia tracheitis on 11/8-->11/15 and then resumed cipro/keflex.  Hospital course c/b Delirium for which Seroquel was added and home Lexapro continued. Tracheostomy changed to #9 Cuffed [place] by ENT 11/3.  Despite trach change patient remained with persistent air leak.  On 11/19, the trach was again changed due to leak and loss of volumes on vent.  Trach was changed to #8 distal cuffed [place].  Patient seen by Dermatology for back lesions.  One diagnosed as a seborrheic keratitis and the other a dermatofibroma.  Intermittent abdominal pain throughout hospital course, at times relieved with gas/BM, w/u negative, seen by GI - no recs.  12/10 pt completed cipro and keflex for osteo treatment.  12/13 moderate amounts of secretions w/ blood - tranexamic acid neb given with notable improvement.  12/18 with blood tinged secretion again - TXA 250mg neb x2 doses.  12/19 spiked fever to 102 - pancultured, negative w/u.    1/1:  No acute events. Remains medically stable, DC planning in progress.  72 y/o F with PMHx of T2DM, HTN, HLD, anemia, hypothyroidism, RA, fibromyalgia, remote cerebral aneurysm repair, acute hypercapnic respiratory failure now with tracheostomy, PEG tube, and bedbound (trach change 8/18, PEG change 8/14), sacral pressure ulcer, BIBEMS from home for 6 day hx of bleeding from the tracheostomy and PEG tube with associated abdominal pain, recent history of auditory and visual hallucinations, and with chronic sacral decubitus ulcer. Exam notable for mild abdominal distention with LLQ and RLQ tenderness, tracheostomy in place with no obvious bleeding, PEG tube with scant amount of dried blood, at baseline neurologic status. Imaging showing no active bleeding around tracheostomy site, however was notable for mild thickening along PEG tube tract, sacral ulcer extending to the coccyx suggestive of possible osteomyelitis, and bibasilar patchy lung opacities with volume loss. Patient admitted for respiratory support, wound care of tracheostomy and PEG, and management of sacral osteomyelitis. No further Trach and peg site bleeding noted since admission.  Pelvic MRI imaging also suggestive of Acute OM. Patient placed on long-term antibiotics with Infectious disease guidance.  Additionally treated with a 7d course of Cefepime due to PSA and Serratia tracheitis on 11/8-->11/15 and then resumed cipro/keflex.  Hospital course c/b Delirium for which Seroquel was added and home Lexapro continued. Tracheostomy changed to #9 Cuffed [place] by ENT 11/3.  Despite trach change patient remained with persistent air leak.  On 11/19, the trach was again changed due to leak and loss of volumes on vent.  Trach was changed to #8 distal cuffed [place].  Patient seen by Dermatology for back lesions.  One diagnosed as a seborrheic keratitis and the other a dermatofibroma.  Intermittent abdominal pain throughout hospital course, at times relieved with gas/BM, w/u negative, seen by GI - no recs.  12/10 pt completed cipro and keflex for osteo treatment.  12/13 moderate amounts of secretions w/ blood - tranexamic acid neb given with notable improvement.  12/18 with blood tinged secretion again - TXA 250mg neb x2 doses.  12/19 spiked fever to 102 - pancultured, negative w/u.    1/1:  No acute events. Remains medically stable, DC planning in progress.

## 2024-01-02 LAB
GLUCOSE BLDC GLUCOMTR-MCNC: 144 MG/DL — HIGH (ref 70–99)
GLUCOSE BLDC GLUCOMTR-MCNC: 144 MG/DL — HIGH (ref 70–99)
GLUCOSE BLDC GLUCOMTR-MCNC: 162 MG/DL — HIGH (ref 70–99)
GLUCOSE BLDC GLUCOMTR-MCNC: 162 MG/DL — HIGH (ref 70–99)
GLUCOSE BLDC GLUCOMTR-MCNC: 172 MG/DL — HIGH (ref 70–99)
GLUCOSE BLDC GLUCOMTR-MCNC: 172 MG/DL — HIGH (ref 70–99)
GLUCOSE BLDC GLUCOMTR-MCNC: 180 MG/DL — HIGH (ref 70–99)
GLUCOSE BLDC GLUCOMTR-MCNC: 180 MG/DL — HIGH (ref 70–99)
GLUCOSE BLDC GLUCOMTR-MCNC: 194 MG/DL — HIGH (ref 70–99)
GLUCOSE BLDC GLUCOMTR-MCNC: 194 MG/DL — HIGH (ref 70–99)
GLUCOSE BLDC GLUCOMTR-MCNC: 198 MG/DL — HIGH (ref 70–99)
GLUCOSE BLDC GLUCOMTR-MCNC: 198 MG/DL — HIGH (ref 70–99)

## 2024-01-02 PROCEDURE — 99232 SBSQ HOSP IP/OBS MODERATE 35: CPT

## 2024-01-02 RX ADMIN — Medication 1 APPLICATION(S): at 12:42

## 2024-01-02 RX ADMIN — LIDOCAINE 1 PATCH: 4 CREAM TOPICAL at 00:00

## 2024-01-02 RX ADMIN — CHLORHEXIDINE GLUCONATE 15 MILLILITER(S): 213 SOLUTION TOPICAL at 17:04

## 2024-01-02 RX ADMIN — Medication 2 DROP(S): at 18:04

## 2024-01-02 RX ADMIN — Medication 3 MILLILITER(S): at 11:49

## 2024-01-02 RX ADMIN — AMLODIPINE BESYLATE 10 MILLIGRAM(S): 2.5 TABLET ORAL at 05:40

## 2024-01-02 RX ADMIN — Medication 1 APPLICATION(S): at 18:04

## 2024-01-02 RX ADMIN — Medication 1 TABLET(S): at 18:01

## 2024-01-02 RX ADMIN — ESCITALOPRAM OXALATE 10 MILLIGRAM(S): 10 TABLET, FILM COATED ORAL at 08:50

## 2024-01-02 RX ADMIN — LIDOCAINE 1 PATCH: 4 CREAM TOPICAL at 12:41

## 2024-01-02 RX ADMIN — CHLORHEXIDINE GLUCONATE 1 APPLICATION(S): 213 SOLUTION TOPICAL at 05:41

## 2024-01-02 RX ADMIN — Medication 1 TABLET(S): at 05:40

## 2024-01-02 RX ADMIN — NYSTATIN CREAM 1 APPLICATION(S): 100000 CREAM TOPICAL at 05:40

## 2024-01-02 RX ADMIN — INSULIN HUMAN 9 UNIT(S): 100 INJECTION, SOLUTION SUBCUTANEOUS at 18:01

## 2024-01-02 RX ADMIN — PHENYLEPHRINE-SHARK LIVER OIL-MINERAL OIL-PETROLATUM RECTAL OINTMENT 1 APPLICATION(S): at 10:30

## 2024-01-02 RX ADMIN — Medication 2 DROP(S): at 05:40

## 2024-01-02 RX ADMIN — QUETIAPINE FUMARATE 12.5 MILLIGRAM(S): 200 TABLET, FILM COATED ORAL at 18:04

## 2024-01-02 RX ADMIN — NYSTATIN CREAM 1 APPLICATION(S): 100000 CREAM TOPICAL at 21:27

## 2024-01-02 RX ADMIN — PANTOPRAZOLE SODIUM 40 MILLIGRAM(S): 20 TABLET, DELAYED RELEASE ORAL at 08:52

## 2024-01-02 RX ADMIN — SIMETHICONE 80 MILLIGRAM(S): 80 TABLET, CHEWABLE ORAL at 18:02

## 2024-01-02 RX ADMIN — SIMETHICONE 80 MILLIGRAM(S): 80 TABLET, CHEWABLE ORAL at 12:41

## 2024-01-02 RX ADMIN — Medication 1 APPLICATION(S): at 05:40

## 2024-01-02 RX ADMIN — Medication 3 MILLILITER(S): at 17:23

## 2024-01-02 RX ADMIN — LIDOCAINE 5 MILLILITER(S): 4 CREAM TOPICAL at 05:39

## 2024-01-02 RX ADMIN — INSULIN HUMAN 32 UNIT(S): 100 INJECTION, SOLUTION SUBCUTANEOUS at 06:08

## 2024-01-02 RX ADMIN — Medication 1 MILLIGRAM(S): at 21:27

## 2024-01-02 RX ADMIN — Medication 125 MICROGRAM(S): at 05:11

## 2024-01-02 RX ADMIN — Medication 1: at 12:37

## 2024-01-02 RX ADMIN — Medication 1: at 18:01

## 2024-01-02 RX ADMIN — Medication 1: at 06:09

## 2024-01-02 RX ADMIN — GABAPENTIN 100 MILLIGRAM(S): 400 CAPSULE ORAL at 21:27

## 2024-01-02 RX ADMIN — Medication 5 MILLIGRAM(S): at 21:27

## 2024-01-02 RX ADMIN — LIDOCAINE 5 MILLILITER(S): 4 CREAM TOPICAL at 18:02

## 2024-01-02 RX ADMIN — SIMETHICONE 80 MILLIGRAM(S): 80 TABLET, CHEWABLE ORAL at 05:40

## 2024-01-02 RX ADMIN — INSULIN HUMAN 15 UNIT(S): 100 INJECTION, SOLUTION SUBCUTANEOUS at 12:40

## 2024-01-02 RX ADMIN — NYSTATIN CREAM 1 APPLICATION(S): 100000 CREAM TOPICAL at 13:00

## 2024-01-02 RX ADMIN — ZINC OXIDE 1 APPLICATION(S): 200 OINTMENT TOPICAL at 10:30

## 2024-01-02 RX ADMIN — CHLORHEXIDINE GLUCONATE 15 MILLILITER(S): 213 SOLUTION TOPICAL at 05:39

## 2024-01-02 RX ADMIN — Medication 500 MILLIGRAM(S): at 12:41

## 2024-01-02 RX ADMIN — INSULIN GLARGINE 19 UNIT(S): 100 INJECTION, SOLUTION SUBCUTANEOUS at 08:50

## 2024-01-02 RX ADMIN — Medication 2 DROP(S): at 12:42

## 2024-01-02 RX ADMIN — Medication 15 MILLILITER(S): at 12:41

## 2024-01-02 RX ADMIN — Medication 3 MILLILITER(S): at 23:28

## 2024-01-02 RX ADMIN — LIDOCAINE 1 PATCH: 4 CREAM TOPICAL at 12:40

## 2024-01-02 RX ADMIN — Medication 3 MILLILITER(S): at 05:29

## 2024-01-02 RX ADMIN — Medication 5 MILLIGRAM(S): at 05:40

## 2024-01-02 NOTE — PROGRESS NOTE ADULT - ASSESSMENT
72 y/o F with PMHx of T2DM, HTN, HLD, anemia, hypothyroidism, RA, fibromyalgia, remote cerebral aneurysm repair, acute hypercapnic respiratory failure now with tracheostomy, PEG tube, and bedbound (trach change 8/18, PEG change 8/14), sacral pressure ulcer, BIBEMS from home for 6 day hx of bleeding from the tracheostomy and PEG tube with associated abdominal pain, recent history of auditory and visual hallucinations, and with chronic sacral decubitus ulcer. Exam notable for mild abdominal distention with LLQ and RLQ tenderness, tracheostomy in place with no obvious bleeding, PEG tube with scant amount of dried blood, at baseline neurologic status. Imaging showing no active bleeding around tracheostomy site, however was notable for mild thickening along PEG tube tract, sacral ulcer extending to the coccyx suggestive of possible osteomyelitis, and bibasilar patchy lung opacities with volume loss. Patient admitted for respiratory support, wound care of tracheostomy and PEG, and management of sacral osteomyelitis. No further Trach and peg site bleeding noted since admission.  Pelvic MRI imaging also suggestive of Acute OM. Patient placed on long-term antibiotics with Infectious disease guidance.  Additionally treated with a 7d course of Cefepime due to PSA and Serratia tracheitis on 11/8-->11/15 and then resumed cipro/keflex.  Hospital course c/b Delirium for which Seroquel was added and home Lexapro continued. Tracheostomy changed to #9 Cuffed [place] by ENT 11/3.  Despite trach change patient remained with persistent air leak.  On 11/19, the trach was again changed due to leak and loss of volumes on vent.  Trach was changed to #8 distal cuffed [place].  Patient seen by Dermatology for back lesions.  One diagnosed as a seborrheic keratitis and the other a dermatofibroma.  Intermittent abdominal pain throughout hospital course, at times relieved with gas/BM, w/u negative, seen by GI - no recs.  12/10 pt completed cipro and keflex for osteo treatment.  12/13 moderate amounts of secretions w/ blood - tranexamic acid neb given with notable improvement.  12/18 with blood tinged secretion again - TXA 250mg neb x2 doses.  12/19 spiked fever to 102 - pancultured, negative w/u.    1/1:  No acute events. Remains medically stable, DC planning in progress.  72 y/o F with PMHx of T2DM, HTN, HLD, anemia, hypothyroidism, RA, fibromyalgia, remote cerebral aneurysm repair, acute hypercapnic respiratory failure now with tracheostomy, PEG tube, and bedbound (trach change 8/18, PEG change 8/14), sacral pressure ulcer, BIBEMS from home for 6 day hx of bleeding from the tracheostomy and PEG tube with associated abdominal pain, recent history of auditory and visual hallucinations, and with chronic sacral decubitus ulcer. Exam notable for mild abdominal distention with LLQ and RLQ tenderness, tracheostomy in place with no obvious bleeding, PEG tube with scant amount of dried blood, at baseline neurologic status. Imaging showing no active bleeding around tracheostomy site, however was notable for mild thickening along PEG tube tract, sacral ulcer extending to the coccyx suggestive of possible osteomyelitis, and bibasilar patchy lung opacities with volume loss. Patient admitted for respiratory support, wound care of tracheostomy and PEG, and management of sacral osteomyelitis. No further Trach and peg site bleeding noted since admission.  Pelvic MRI imaging also suggestive of Acute OM. Patient placed on long-term antibiotics with Infectious disease guidance.  Additionally treated with a 7d course of Cefepime due to PSA and Serratia tracheitis on 11/8-->11/15 and then resumed cipro/keflex.  Hospital course c/b Delirium for which Seroquel was added and home Lexapro continued. Tracheostomy changed to #9 Cuffed [place] by ENT 11/3.  Despite trach change patient remained with persistent air leak.  On 11/19, the trach was again changed due to leak and loss of volumes on vent.  Trach was changed to #8 distal cuffed [place].  Patient seen by Dermatology for back lesions.  One diagnosed as a seborrheic keratitis and the other a dermatofibroma.  Intermittent abdominal pain throughout hospital course, at times relieved with gas/BM, w/u negative, seen by GI - no recs.  12/10 pt completed cipro and keflex for osteo treatment.  12/13 moderate amounts of secretions w/ blood - tranexamic acid neb given with notable improvement.  12/18 with blood tinged secretion again - TXA 250mg neb x2 doses.  12/19 spiked fever to 102 - pancultured, negative w/u.    1/2: 74 y/o F with PMHx of T2DM, HTN, HLD, anemia, hypothyroidism, RA, fibromyalgia, remote cerebral aneurysm repair, acute hypercapnic respiratory failure now with tracheostomy, PEG tube, and bedbound (trach change 8/18, PEG change 8/14), sacral pressure ulcer, BIBEMS from home for 6 day hx of bleeding from the tracheostomy and PEG tube with associated abdominal pain, recent history of auditory and visual hallucinations, and with chronic sacral decubitus ulcer. Exam notable for mild abdominal distention with LLQ and RLQ tenderness, tracheostomy in place with no obvious bleeding, PEG tube with scant amount of dried blood, at baseline neurologic status. Imaging showing no active bleeding around tracheostomy site, however was notable for mild thickening along PEG tube tract, sacral ulcer extending to the coccyx suggestive of possible osteomyelitis, and bibasilar patchy lung opacities with volume loss. Patient admitted for respiratory support, wound care of tracheostomy and PEG, and management of sacral osteomyelitis. No further Trach and peg site bleeding noted since admission.  Pelvic MRI imaging also suggestive of Acute OM. Patient placed on long-term antibiotics with Infectious disease guidance.  Additionally treated with a 7d course of Cefepime due to PSA and Serratia tracheitis on 11/8-->11/15 and then resumed cipro/keflex.  Hospital course c/b Delirium for which Seroquel was added and home Lexapro continued. Tracheostomy changed to #9 Cuffed [place] by ENT 11/3.  Despite trach change patient remained with persistent air leak.  On 11/19, the trach was again changed due to leak and loss of volumes on vent.  Trach was changed to #8 distal cuffed [place].  Patient seen by Dermatology for back lesions.  One diagnosed as a seborrheic keratitis and the other a dermatofibroma.  Intermittent abdominal pain throughout hospital course, at times relieved with gas/BM, w/u negative, seen by GI - no recs.  12/10 pt completed cipro and keflex for osteo treatment.  12/13 moderate amounts of secretions w/ blood - tranexamic acid neb given with notable improvement.  12/18 with blood tinged secretion again - TXA 250mg neb x2 doses.  12/19 spiked fever to 102 - pancultured, negative w/u.    1/2: 72 y/o F with PMHx of T2DM, HTN, HLD, anemia, hypothyroidism, RA, fibromyalgia, remote cerebral aneurysm repair, acute hypercapnic respiratory failure now with tracheostomy, PEG tube, and bedbound (trach change 8/18, PEG change 8/14), sacral pressure ulcer, BIBEMS from home for 6 day hx of bleeding from the tracheostomy and PEG tube with associated abdominal pain, recent history of auditory and visual hallucinations, and with chronic sacral decubitus ulcer. Exam notable for mild abdominal distention with LLQ and RLQ tenderness, tracheostomy in place with no obvious bleeding, PEG tube with scant amount of dried blood, at baseline neurologic status. Imaging showing no active bleeding around tracheostomy site, however was notable for mild thickening along PEG tube tract, sacral ulcer extending to the coccyx suggestive of possible osteomyelitis, and bibasilar patchy lung opacities with volume loss. Patient admitted for respiratory support, wound care of tracheostomy and PEG, and management of sacral osteomyelitis. No further Trach and peg site bleeding noted since admission.  Pelvic MRI imaging also suggestive of Acute OM. Patient placed on long-term antibiotics with Infectious disease guidance.  Additionally treated with a 7d course of Cefepime due to PSA and Serratia tracheitis on 11/8-->11/15 and then resumed cipro/keflex.  Hospital course c/b Delirium for which Seroquel was added and home Lexapro continued. Tracheostomy changed to #9 Cuffed [place] by ENT 11/3.  Despite trach change patient remained with persistent air leak.  On 11/19, the trach was again changed due to leak and loss of volumes on vent.  Trach was changed to #8 distal cuffed [place].  Patient seen by Dermatology for back lesions.  One diagnosed as a seborrheic keratitis and the other a dermatofibroma.  Intermittent abdominal pain throughout hospital course, at times relieved with gas/BM, w/u negative, seen by GI - no recs.  12/10 pt completed cipro and keflex for osteo treatment.  12/13 moderate amounts of secretions w/ blood - tranexamic acid neb given with notable improvement.  12/18 with blood tinged secretion again - TXA 250mg neb x2 doses.  12/19 spiked fever to 102 - pancultured, negative w/u.    1/2:  Pt remains medically stable.  Phone meeting planned for tomorrow with  [place], pts daughter Marysol, and insurance company to discuss discharge.  Daughter continues to be educated by RT and nursing staff on trach care and inner cannula changes.

## 2024-01-02 NOTE — PROGRESS NOTE ADULT - NS ATTEND AMEND GEN_ALL_CORE FT
73F with a h/o DM, HTN, HLD, anemia, hypothyroidism, RA, fibromyalgia, remote cerebral aneurysm with repair, hypercapnic respiratory failure s/p tracheostomy/PEG, bedbound, sacral pressure ulcer. Admitted w/ bleeding from tracheostomy and PEG w/ associated abdominal pain, recent hx of auditory/visual hallucinations. Imaging showed mild thickening along PEG tube tract and sacral ulcer extending to coccyx suggestive of acute osteomyelitis.      No acute events overnight  On physical exam she is awake, breathing comfortably on ventilator, bilateral breath sounds present, abd soft, non tender non distended, extremities warm and well perfused.  Remains on chronic mech vent, tolerating PS trials during the day for periods of time  Tolerating PEG feeds  Sacral OM s/p 6 week course of Cipro and Keflex as per ID - completed 12/10/23  c/w current pain regimen - in setting of fibromyalgia and pain from wound  Medically stable for discharge 73F with a h/o DM, HTN, HLD, anemia, hypothyroidism, RA, fibromyalgia, remote cerebral aneurysm with repair, hypercapnic respiratory failure s/p tracheostomy/PEG, bedbound, sacral pressure ulcer. Admitted w/ bleeding from tracheostomy and PEG w/ associated abdominal pain, recent hx of auditory/visual hallucinations. Imaging showed mild thickening along PEG tube tract and sacral ulcer extending to coccyx suggestive of acute osteomyelitis.      No acute events overnight  On physical exam she is awake, breathing comfortably on ventilator, bilateral breath sounds present, abd soft, non tender non distended, extremities warm and well perfused.  Remains on chronic mech vent, tolerating PS trials during the day for periods of time  Tolerating PEG feeds  Sacral OM s/p 6 week course of Cipro and Keflex as per ID - completed 12/10/23  c/w current pain regimen - in setting of fibromyalgia and pain from wound  Medically stable for discharge.     Time-based billing (NON-critical care).     38 minutes spent on total encounter. The necessity of the time spent during the encounter on this date of service was due to:     review of the medical record, medical decision making as above, discussion w/ interdisciplinary team.

## 2024-01-02 NOTE — PROGRESS NOTE ADULT - SUBJECTIVE AND OBJECTIVE BOX
Patient is a 73y old  Female who presents with a chief complaint of PEG Bleeding (22 Dec 2023 07:35)      Interval Events:    REVIEW OF SYSTEMS:  [ ] Positive  [ ] All other systems negative  [ ] Unable to assess ROS because ________    Vital Signs Last 24 Hrs  T(C): 36.7 (01-02-24 @ 04:47), Max: 36.9 (01-01-24 @ 11:44)  T(F): 98 (01-02-24 @ 04:47), Max: 98.5 (01-01-24 @ 11:44)  HR: 80 (01-02-24 @ 05:29) (80 - 91)  BP: 142/66 (01-02-24 @ 04:47) (113/51 - 142/66)  RR: 18 (01-02-24 @ 04:47) (12 - 28)  SpO2: 99% (01-02-24 @ 05:29) (96% - 100%)    PHYSICAL EXAM:  HEENT:   [ ]Tracheostomy:  [ ]Pupils equal  [ ]No oral lesions  [ ]Abnormal    SKIN  [ ]No Rash  [ ] Abnormal  [ ] pressure    CARDIAC  [ ]Regular  [ ]Abnormal    PULMONARY  [ ]Bilateral Clear Breath Sounds  [ ]Normal Excursion  [ ]Abnormal    GI  [ ]PEG      [ ] +BS		              [ ]Soft, nondistended, nontender	  [ ]Abnormal    MUSCULOSKELETAL                                   [ ]Bedbound                 [ ]Abnormal    [ ]Ambulatory/OOB to chair                           EXTREMITIES                                         [ ]Normal  [ ]Edema                           NEUROLOGIC  [ ] Normal, non focal  [ ] Focal findings:    PSYCHIATRIC  [ ]Alert and appropriate  [ ] Sedated	 [ ]Agitated    :  Mcbride: [ ] Yes, if yes: Date of Placement:                   [  ] No    LINES: Central Lines [ ] Yes, if yes: Date of Placement                                     [  ] No    HOSPITAL MEDICATIONS:  MEDICATIONS  (STANDING):  albuterol/ipratropium for Nebulization 3 milliLiter(s) Nebulizer every 6 hours  amLODIPine   Tablet 10 milliGRAM(s) Oral daily  artificial  tears Solution 2 Drop(s) Both EYES four times a day  ascorbic acid 500 milliGRAM(s) Oral daily  calcium carbonate    500 mG (Tums) Chewable 1 Tablet(s) Chew every 12 hours  chlorhexidine 0.12% Liquid 15 milliLiter(s) Oral Mucosa every 12 hours  chlorhexidine 4% Liquid 1 Application(s) Topical <User Schedule>  dextrose 50% Injectable 12.5 Gram(s) IV Push once  dextrose 50% Injectable 25 Gram(s) IV Push once  escitalopram 10 milliGRAM(s) Oral <User Schedule>  fentaNYL   Patch  25 MICROgram(s)/Hr 1 Patch Transdermal every 72 hours  gabapentin Solution 100 milliGRAM(s) Enteral Tube at bedtime  glucagon  Injectable 1 milliGRAM(s) IntraMuscular once  hemorrhoidal Ointment 1 Application(s) Rectal daily  hemorrhoidal Ointment      insulin glargine Injectable (LANTUS) 19 Unit(s) SubCutaneous every morning  insulin lispro (ADMELOG) corrective regimen sliding scale   SubCutaneous every 6 hours  insulin regular  human recombinant 15 Unit(s) SubCutaneous <User Schedule>  insulin regular  human recombinant 32 Unit(s) SubCutaneous <User Schedule>  insulin regular  human recombinant 9 Unit(s) SubCutaneous <User Schedule>  levothyroxine 125 MICROGram(s) Oral <User Schedule>  lidocaine   4% Patch 1 Patch Transdermal daily  lidocaine   4% Patch 1 Patch Transdermal daily  lidocaine 2% Viscous 5 milliLiter(s) Mucosal two times a day  melatonin 1 milliGRAM(s) Oral at bedtime  melatonin 5 milliGRAM(s) Oral at bedtime  multivitamin/minerals/iron Oral Solution (CENTRUM) 15 milliLiter(s) Oral daily  nystatin Powder 1 Application(s) Topical every 8 hours  pantoprazole   Suspension 40 milliGRAM(s) Enteral Tube <User Schedule>  petrolatum Ophthalmic Ointment 1 Application(s) Both EYES four times a day  predniSONE   Tablet 5 milliGRAM(s) Oral daily  QUEtiapine 12.5 milliGRAM(s) Oral <User Schedule>  silver nitrate Applicator 1 Application(s) Topical once  simethicone 80 milliGRAM(s) Chew four times a day  zinc oxide 40% Paste 1 Application(s) Topical daily    MEDICATIONS  (PRN):  acetaminophen   Oral Liquid .. 1000 milliGRAM(s) Enteral Tube every 6 hours PRN Temp greater or equal to 38C (100.4F), Mild Pain (1 - 3)  benzocaine 20% Spray 1 Spray(s) Topical four times a day PRN Cough  diclofenac sodium 1% Gel 2 Gram(s) Topical four times a day PRN pain  diphenhydrAMINE Elixir 25 milliGRAM(s) Oral every 6 hours PRN Rash and/or Itching  guaifenesin/dextromethorphan Oral Liquid 10 milliLiter(s) Oral every 6 hours PRN Cough  oxyCODONE    Solution 2.5 milliGRAM(s) Oral every 6 hours PRN Moderate Pain (4 - 6)  sodium chloride 0.65% Nasal 1 Spray(s) Both Nostrils every 6 hours PRN Nasal Congestion      LABS:                  CAPILLARY BLOOD GLUCOSE    MICROBIOLOGY:     RADIOLOGY:  [ ] Reviewed and interpreted by me    Mode: AC/ CMV (Assist Control/ Continuous Mandatory Ventilation)  RR (machine): 12  TV (machine): 300  FiO2: 30  PEEP: 5  ITime: 0.77  MAP: 9  PIP: 25   Patient is a 73y old  Female who presents with a chief complaint of PEG Bleeding (22 Dec 2023 07:35)      Interval Events:  No acute events overnight.     REVIEW OF SYSTEMS:  [ ] Positive  [X] All other systems negative  [ ] Unable to assess ROS because ________    Vital Signs Last 24 Hrs  T(C): 36.7 (01-02-24 @ 04:47), Max: 36.9 (01-01-24 @ 11:44)  T(F): 98 (01-02-24 @ 04:47), Max: 98.5 (01-01-24 @ 11:44)  HR: 80 (01-02-24 @ 05:29) (80 - 91)  BP: 142/66 (01-02-24 @ 04:47) (113/51 - 142/66)  RR: 18 (01-02-24 @ 04:47) (12 - 28)  SpO2: 99% (01-02-24 @ 05:29) (96% - 100%)    PHYSICAL EXAM:  HEENT:   [X]Tracheostomy: #8 distal XLT Cuffed Shiley  [X]Pupils equal  [ ]No oral lesions  [ ]Abnormal    SKIN  [X]No Rash  [ ] Abnormal  [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    CARDIAC  [X]Regular  [ ]Abnormal    PULMONARY  [X]Bilateral Clear Breath Sounds  [ ]Normal Excursion  [ ]Abnormal    GI  [X]PEG - site c/d/i      [X] +BS		              [X]Soft, nondistended, nontender	  [ ]Abnormal    MUSCULOSKELETAL                                   [X]Bedbound                 [ ]Abnormal    [ ]Ambulatory/OOB to chair                           EXTREMITIES                                         [X]Normal  [ ]Edema                           NEUROLOGIC  [ ] Normal, non focal  [X] Focal findings: opens eyes spontaneously, follows commands on all 4 extremities    PSYCHIATRIC  [X]Alert and appropriate  [ ] Sedated	 [ ]Agitated    :  Mcbride: [ ] Yes, if yes: Date of Placement:                   [X] No    LINES: Central Lines [ ] Yes, if yes: Date of Placement                                     [X] No    HOSPITAL MEDICATIONS:  MEDICATIONS  (STANDING):  albuterol/ipratropium for Nebulization 3 milliLiter(s) Nebulizer every 6 hours  amLODIPine   Tablet 10 milliGRAM(s) Oral daily  artificial  tears Solution 2 Drop(s) Both EYES four times a day  ascorbic acid 500 milliGRAM(s) Oral daily  calcium carbonate    500 mG (Tums) Chewable 1 Tablet(s) Chew every 12 hours  chlorhexidine 0.12% Liquid 15 milliLiter(s) Oral Mucosa every 12 hours  chlorhexidine 4% Liquid 1 Application(s) Topical <User Schedule>  dextrose 50% Injectable 12.5 Gram(s) IV Push once  dextrose 50% Injectable 25 Gram(s) IV Push once  escitalopram 10 milliGRAM(s) Oral <User Schedule>  fentaNYL   Patch  25 MICROgram(s)/Hr 1 Patch Transdermal every 72 hours  gabapentin Solution 100 milliGRAM(s) Enteral Tube at bedtime  glucagon  Injectable 1 milliGRAM(s) IntraMuscular once  hemorrhoidal Ointment 1 Application(s) Rectal daily  hemorrhoidal Ointment      insulin glargine Injectable (LANTUS) 19 Unit(s) SubCutaneous every morning  insulin lispro (ADMELOG) corrective regimen sliding scale   SubCutaneous every 6 hours  insulin regular  human recombinant 15 Unit(s) SubCutaneous <User Schedule>  insulin regular  human recombinant 32 Unit(s) SubCutaneous <User Schedule>  insulin regular  human recombinant 9 Unit(s) SubCutaneous <User Schedule>  levothyroxine 125 MICROGram(s) Oral <User Schedule>  lidocaine   4% Patch 1 Patch Transdermal daily  lidocaine   4% Patch 1 Patch Transdermal daily  lidocaine 2% Viscous 5 milliLiter(s) Mucosal two times a day  melatonin 1 milliGRAM(s) Oral at bedtime  melatonin 5 milliGRAM(s) Oral at bedtime  multivitamin/minerals/iron Oral Solution (CENTRUM) 15 milliLiter(s) Oral daily  nystatin Powder 1 Application(s) Topical every 8 hours  pantoprazole   Suspension 40 milliGRAM(s) Enteral Tube <User Schedule>  petrolatum Ophthalmic Ointment 1 Application(s) Both EYES four times a day  predniSONE   Tablet 5 milliGRAM(s) Oral daily  QUEtiapine 12.5 milliGRAM(s) Oral <User Schedule>  silver nitrate Applicator 1 Application(s) Topical once  simethicone 80 milliGRAM(s) Chew four times a day  zinc oxide 40% Paste 1 Application(s) Topical daily    MEDICATIONS  (PRN):  acetaminophen   Oral Liquid .. 1000 milliGRAM(s) Enteral Tube every 6 hours PRN Temp greater or equal to 38C (100.4F), Mild Pain (1 - 3)  benzocaine 20% Spray 1 Spray(s) Topical four times a day PRN Cough  diclofenac sodium 1% Gel 2 Gram(s) Topical four times a day PRN pain  diphenhydrAMINE Elixir 25 milliGRAM(s) Oral every 6 hours PRN Rash and/or Itching  guaifenesin/dextromethorphan Oral Liquid 10 milliLiter(s) Oral every 6 hours PRN Cough  oxyCODONE    Solution 2.5 milliGRAM(s) Oral every 6 hours PRN Moderate Pain (4 - 6)  sodium chloride 0.65% Nasal 1 Spray(s) Both Nostrils every 6 hours PRN Nasal Congestion      LABS:                  CAPILLARY BLOOD GLUCOSE    MICROBIOLOGY:     RADIOLOGY:  [ ] Reviewed and interpreted by me    Mode: AC/ CMV (Assist Control/ Continuous Mandatory Ventilation)  RR (machine): 12  TV (machine): 300  FiO2: 30  PEEP: 5  ITime: 0.77  MAP: 9  PIP: 25

## 2024-01-02 NOTE — PROGRESS NOTE ADULT - PROBLEM SELECTOR PLAN 2
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   - S/p CT abd/pelvis (10/28): Sacral decubitus ulcer extending to the coccyx with osseous remodeling and pelvic MRI or bone scan to recc to exclude osteomyelitis.  - 10/30 wound care note: Sacral stage 4 pressure injury 4.5cm x 2.5cm x 1.5cm w/ lip of undermining circumferentially greatest 9-12 of 3cm.  - MRI pelvis 10/30 with Osteomyelitis, completed Cefepime for 7 days, Vancomycin d/maril   - 11/10: resumed Cipro 500mg q 12 and Keflex 500mg q 6 until 12/10.  - 11/20: Changed to IV Cipro 400 q12 as recommended by ID pharmacist due to multiple drug interactions when given via PEG  - 11/28 wound care note: Sacral stage 4pressure injury 4.5cm x 2.5cm x 0.5cm, moist granular wound bed   palpable but not exposed bone no blistering, (+) serosanguinous drainage. No odor, erythema, increased warmth, tenderness, induration, fluctuance, nor crepitus.  - 12/3 wound culture ordered - d/c'd as no need for culture, wound improving, no signs of infection  - 12/10 completed cipro and keflex x5 week course  - wound care following 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   - S/p CT abd/pelvis (10/28): Sacral decubitus ulcer extending to the coccyx with osseous remodeling and pelvic MRI or bone scan to recc to exclude osteomyelitis.  - 10/30 wound care note: Sacral stage 4 pressure injury 4.5cm x 2.5cm x 1.5cm w/ lip of undermining circumferentially greatest 9-12 of 3cm.  - MRI pelvis 10/30 with Osteomyelitis, completed Cefepime for 7 days, Vancomycin d/marli   - 11/10: resumed Cipro 500mg q 12 and Keflex 500mg q 6 until 12/10.  - 11/20: Changed to IV Cipro 400 q12 as recommended by ID pharmacist due to multiple drug interactions when given via PEG  - 11/28 wound care note: Sacral stage 4pressure injury 4.5cm x 2.5cm x 0.5cm, moist granular wound bed   palpable but not exposed bone no blistering, (+) serosanguinous drainage. No odor, erythema, increased warmth, tenderness, induration, fluctuance, nor crepitus.  - 12/3 wound culture ordered - d/c'd as no need for culture, wound improving, no signs of infection  - 12/10 completed cipro and keflex x5 week course  - wound care following

## 2024-01-03 LAB
GLUCOSE BLDC GLUCOMTR-MCNC: 137 MG/DL — HIGH (ref 70–99)
GLUCOSE BLDC GLUCOMTR-MCNC: 137 MG/DL — HIGH (ref 70–99)
GLUCOSE BLDC GLUCOMTR-MCNC: 166 MG/DL — HIGH (ref 70–99)
GLUCOSE BLDC GLUCOMTR-MCNC: 166 MG/DL — HIGH (ref 70–99)
GLUCOSE BLDC GLUCOMTR-MCNC: 195 MG/DL — HIGH (ref 70–99)
GLUCOSE BLDC GLUCOMTR-MCNC: 195 MG/DL — HIGH (ref 70–99)
GLUCOSE BLDC GLUCOMTR-MCNC: 199 MG/DL — HIGH (ref 70–99)
GLUCOSE BLDC GLUCOMTR-MCNC: 199 MG/DL — HIGH (ref 70–99)

## 2024-01-03 PROCEDURE — 99232 SBSQ HOSP IP/OBS MODERATE 35: CPT

## 2024-01-03 RX ORDER — INSULIN HUMAN 100 [IU]/ML
34 INJECTION, SOLUTION SUBCUTANEOUS
Refills: 0 | Status: DISCONTINUED | OUTPATIENT
Start: 2024-01-04 | End: 2024-01-10

## 2024-01-03 RX ADMIN — GABAPENTIN 100 MILLIGRAM(S): 400 CAPSULE ORAL at 21:20

## 2024-01-03 RX ADMIN — LIDOCAINE 1 PATCH: 4 CREAM TOPICAL at 00:05

## 2024-01-03 RX ADMIN — Medication 3 MILLILITER(S): at 11:37

## 2024-01-03 RX ADMIN — Medication 1: at 06:17

## 2024-01-03 RX ADMIN — Medication 3 MILLILITER(S): at 06:14

## 2024-01-03 RX ADMIN — Medication 1: at 00:20

## 2024-01-03 RX ADMIN — Medication 1 TABLET(S): at 06:16

## 2024-01-03 RX ADMIN — SIMETHICONE 80 MILLIGRAM(S): 80 TABLET, CHEWABLE ORAL at 00:20

## 2024-01-03 RX ADMIN — LIDOCAINE 5 MILLILITER(S): 4 CREAM TOPICAL at 06:08

## 2024-01-03 RX ADMIN — CHLORHEXIDINE GLUCONATE 15 MILLILITER(S): 213 SOLUTION TOPICAL at 06:17

## 2024-01-03 RX ADMIN — Medication 3 MILLILITER(S): at 17:45

## 2024-01-03 RX ADMIN — Medication 2 DROP(S): at 17:57

## 2024-01-03 RX ADMIN — LIDOCAINE 1 PATCH: 4 CREAM TOPICAL at 00:06

## 2024-01-03 RX ADMIN — Medication 1: at 17:56

## 2024-01-03 RX ADMIN — LIDOCAINE 5 MILLILITER(S): 4 CREAM TOPICAL at 17:56

## 2024-01-03 RX ADMIN — Medication 1 TABLET(S): at 17:57

## 2024-01-03 RX ADMIN — QUETIAPINE FUMARATE 12.5 MILLIGRAM(S): 200 TABLET, FILM COATED ORAL at 17:55

## 2024-01-03 RX ADMIN — SIMETHICONE 80 MILLIGRAM(S): 80 TABLET, CHEWABLE ORAL at 06:17

## 2024-01-03 RX ADMIN — ESCITALOPRAM OXALATE 10 MILLIGRAM(S): 10 TABLET, FILM COATED ORAL at 09:27

## 2024-01-03 RX ADMIN — SIMETHICONE 80 MILLIGRAM(S): 80 TABLET, CHEWABLE ORAL at 12:10

## 2024-01-03 RX ADMIN — INSULIN HUMAN 32 UNIT(S): 100 INJECTION, SOLUTION SUBCUTANEOUS at 06:16

## 2024-01-03 RX ADMIN — CHLORHEXIDINE GLUCONATE 1 APPLICATION(S): 213 SOLUTION TOPICAL at 06:18

## 2024-01-03 RX ADMIN — Medication 5 MILLIGRAM(S): at 21:21

## 2024-01-03 RX ADMIN — NYSTATIN CREAM 1 APPLICATION(S): 100000 CREAM TOPICAL at 06:18

## 2024-01-03 RX ADMIN — INSULIN HUMAN 15 UNIT(S): 100 INJECTION, SOLUTION SUBCUTANEOUS at 12:12

## 2024-01-03 RX ADMIN — Medication 15 MILLILITER(S): at 12:10

## 2024-01-03 RX ADMIN — PHENYLEPHRINE-SHARK LIVER OIL-MINERAL OIL-PETROLATUM RECTAL OINTMENT 1 APPLICATION(S): at 12:14

## 2024-01-03 RX ADMIN — AMLODIPINE BESYLATE 10 MILLIGRAM(S): 2.5 TABLET ORAL at 06:17

## 2024-01-03 RX ADMIN — Medication 3 MILLILITER(S): at 23:50

## 2024-01-03 RX ADMIN — Medication 1 MILLIGRAM(S): at 21:20

## 2024-01-03 RX ADMIN — INSULIN GLARGINE 19 UNIT(S): 100 INJECTION, SOLUTION SUBCUTANEOUS at 09:27

## 2024-01-03 RX ADMIN — PANTOPRAZOLE SODIUM 40 MILLIGRAM(S): 20 TABLET, DELAYED RELEASE ORAL at 09:27

## 2024-01-03 RX ADMIN — LIDOCAINE 1 PATCH: 4 CREAM TOPICAL at 23:56

## 2024-01-03 RX ADMIN — Medication 500 MILLIGRAM(S): at 12:13

## 2024-01-03 RX ADMIN — CHLORHEXIDINE GLUCONATE 15 MILLILITER(S): 213 SOLUTION TOPICAL at 18:56

## 2024-01-03 RX ADMIN — Medication 1: at 12:12

## 2024-01-03 RX ADMIN — INSULIN HUMAN 9 UNIT(S): 100 INJECTION, SOLUTION SUBCUTANEOUS at 17:56

## 2024-01-03 RX ADMIN — Medication 125 MICROGRAM(S): at 06:16

## 2024-01-03 RX ADMIN — LIDOCAINE 1 PATCH: 4 CREAM TOPICAL at 12:11

## 2024-01-03 RX ADMIN — ZINC OXIDE 1 APPLICATION(S): 200 OINTMENT TOPICAL at 12:16

## 2024-01-03 RX ADMIN — Medication 1 APPLICATION(S): at 06:18

## 2024-01-03 RX ADMIN — NYSTATIN CREAM 1 APPLICATION(S): 100000 CREAM TOPICAL at 14:57

## 2024-01-03 RX ADMIN — Medication 2 DROP(S): at 00:20

## 2024-01-03 RX ADMIN — NYSTATIN CREAM 1 APPLICATION(S): 100000 CREAM TOPICAL at 22:57

## 2024-01-03 RX ADMIN — Medication 2 DROP(S): at 06:18

## 2024-01-03 RX ADMIN — Medication 1 APPLICATION(S): at 00:20

## 2024-01-03 RX ADMIN — Medication 1 APPLICATION(S): at 17:57

## 2024-01-03 RX ADMIN — SIMETHICONE 80 MILLIGRAM(S): 80 TABLET, CHEWABLE ORAL at 17:55

## 2024-01-03 RX ADMIN — Medication 1 APPLICATION(S): at 12:16

## 2024-01-03 RX ADMIN — Medication 2 DROP(S): at 12:13

## 2024-01-03 RX ADMIN — Medication 5 MILLIGRAM(S): at 06:17

## 2024-01-03 RX ADMIN — LIDOCAINE 1 PATCH: 4 CREAM TOPICAL at 12:10

## 2024-01-03 NOTE — PROGRESS NOTE ADULT - SUBJECTIVE AND OBJECTIVE BOX
seen earlier today     Chief Complaint: Diabetes Mellitus follow up    INTERVAL HX: tolerating TF , aide at bedside       Review of Systems:  General: As above  GI: No nausea, vomiting  Endocrine: no  S&Sx of hypoglycemia    Allergies    penicillin (Unknown)  heparin (Unknown)  Tagamet (Unknown)  pineapple (Unknown)  walnut (Unknown)  Pecan, Filbert, Hazelnut (Unknown)  Lyrica (Unknown)    Intolerances      MEDICATIONS  (STANDING):  albuterol/ipratropium for Nebulization 3 milliLiter(s) Nebulizer every 6 hours  amLODIPine   Tablet 10 milliGRAM(s) Oral daily  artificial  tears Solution 2 Drop(s) Both EYES four times a day  ascorbic acid 500 milliGRAM(s) Oral daily  calcium carbonate    500 mG (Tums) Chewable 1 Tablet(s) Chew every 12 hours  chlorhexidine 0.12% Liquid 15 milliLiter(s) Oral Mucosa every 12 hours  chlorhexidine 4% Liquid 1 Application(s) Topical <User Schedule>  dextrose 50% Injectable 12.5 Gram(s) IV Push once  dextrose 50% Injectable 25 Gram(s) IV Push once  escitalopram 10 milliGRAM(s) Oral <User Schedule>  fentaNYL   Patch  25 MICROgram(s)/Hr 1 Patch Transdermal every 72 hours  gabapentin Solution 100 milliGRAM(s) Enteral Tube at bedtime  glucagon  Injectable 1 milliGRAM(s) IntraMuscular once  hemorrhoidal Ointment      hemorrhoidal Ointment 1 Application(s) Rectal daily  insulin glargine Injectable (LANTUS) 19 Unit(s) SubCutaneous every morning  insulin lispro (ADMELOG) corrective regimen sliding scale   SubCutaneous every 6 hours  insulin regular  human recombinant 9 Unit(s) SubCutaneous <User Schedule>  insulin regular  human recombinant 15 Unit(s) SubCutaneous <User Schedule>  levothyroxine 125 MICROGram(s) Oral <User Schedule>  lidocaine   4% Patch 1 Patch Transdermal daily  lidocaine   4% Patch 1 Patch Transdermal daily  lidocaine 2% Viscous 5 milliLiter(s) Mucosal two times a day  melatonin 5 milliGRAM(s) Oral at bedtime  melatonin 1 milliGRAM(s) Oral at bedtime  multivitamin/minerals/iron Oral Solution (CENTRUM) 15 milliLiter(s) Oral daily  nystatin Powder 1 Application(s) Topical every 8 hours  pantoprazole   Suspension 40 milliGRAM(s) Enteral Tube <User Schedule>  petrolatum Ophthalmic Ointment 1 Application(s) Both EYES four times a day  predniSONE   Tablet 5 milliGRAM(s) Oral daily  QUEtiapine 12.5 milliGRAM(s) Oral <User Schedule>  silver nitrate Applicator 1 Application(s) Topical once  simethicone 80 milliGRAM(s) Chew four times a day  zinc oxide 40% Paste 1 Application(s) Topical daily        insulin glargine Injectable (LANTUS)   19 Unit(s) SubCutaneous (01-03-24 @ 09:27)    insulin lispro (ADMELOG) corrective regimen sliding scale   1 Unit(s) SubCutaneous (01-03-24 @ 12:12)   1 Unit(s) SubCutaneous (01-03-24 @ 06:17)   1 Unit(s) SubCutaneous (01-03-24 @ 00:20)   1 Unit(s) SubCutaneous (01-02-24 @ 18:01)    insulin regular  human recombinant   32 Unit(s) SubCutaneous (01-03-24 @ 06:16)    insulin regular  human recombinant   15 Unit(s) SubCutaneous (01-03-24 @ 12:12)    insulin regular  human recombinant   9 Unit(s) SubCutaneous (01-02-24 @ 18:01)    levothyroxine   125 MICROGram(s) Oral (01-03-24 @ 06:16)    predniSONE   Tablet   5 milliGRAM(s) Oral (01-03-24 @ 06:17)        PHYSICAL EXAM:  VITALS: T(C): 36.4 (01-03-24 @ 13:20)  T(F): 97.6 (01-03-24 @ 13:20), Max: 99.6 (01-03-24 @ 04:30)  HR: 94 (01-03-24 @ 13:20) (85 - 104)  BP: 118/58 (01-03-24 @ 13:20) (111/52 - 139/71)  RR:  (15 - 22)  SpO2:  (96% - 100%)  Wt(kg): --  GENERAL: NAD, TF to peg  Respiratory: Respirations unlabored trach to vent  Extremities: Warm, no edema  NEURO: Alert nod yes/no to questions    LABS:  POCT Blood Glucose.: 199 mg/dL (01-03-24 @ 11:52)  POCT Blood Glucose.: 166 mg/dL (01-03-24 @ 05:38)  POCT Blood Glucose.: 194 mg/dL (01-02-24 @ 23:56)  POCT Blood Glucose.: 180 mg/dL (01-02-24 @ 17:43)  POCT Blood Glucose.: 198 mg/dL (01-02-24 @ 12:19)  POCT Blood Glucose.: 172 mg/dL (01-02-24 @ 08:47)  POCT Blood Glucose.: 162 mg/dL (01-02-24 @ 06:05)  POCT Blood Glucose.: 144 mg/dL (01-02-24 @ 00:07)  POCT Blood Glucose.: 113 mg/dL (01-01-24 @ 17:21)  POCT Blood Glucose.: 211 mg/dL (01-01-24 @ 11:44)  POCT Blood Glucose.: 136 mg/dL (01-01-24 @ 08:25)  POCT Blood Glucose.: 142 mg/dL (01-01-24 @ 05:21)  POCT Blood Glucose.: 123 mg/dL (12-31-23 @ 23:19)  POCT Blood Glucose.: 121 mg/dL (12-31-23 @ 17:35)              11-25 Chol -- Direct LDL -- LDL calculated -- HDL -- Trig 192<H>  Thyroid Function Tests:      A1C with Estimated Average Glucose Result: 7.5 % (10-28-23 @ 07:18)    Estimated Average Glucose: 169 mg/dL (10-28-23 @ 07:18)        Diet, NPO with Tube Feed:   Tube Feeding Modality: Gastrostomy  [facility] glucose support 1.2  Total Volume for 24 Hours (mL): 1200  Continuous  Starting Tube Feed Rate mL per Hour: 60  Increase Tube Feed Rate by (mL): 0  Until Goal Tube Feed Rate (mL per Hour): 60  Tube Feed Duration (in Hours): 20  Tube Feed Start Time: 06:00  Tube Feed Stop Time: 02:00  Free Water Flush  Pump   Rate (mL per Hour): 10   Frequency: Every 2 Hours  Alfred(7 Gm Arginine/7 Gm Glut/1.2 Gm HMB     Qty per Day:  2 (01-01-24 @ 08:00) [Active]              seen earlier today     Chief Complaint: Diabetes Mellitus follow up    INTERVAL HX: tolerating TF , aide at bedside       Review of Systems:  General: As above  GI: No nausea, vomiting  Endocrine: no  S&Sx of hypoglycemia    Allergies    penicillin (Unknown)  heparin (Unknown)  Tagamet (Unknown)  pineapple (Unknown)  walnut (Unknown)  Pecan, Filbert, Hazelnut (Unknown)  Lyrica (Unknown)    Intolerances      MEDICATIONS  (STANDING):  albuterol/ipratropium for Nebulization 3 milliLiter(s) Nebulizer every 6 hours  amLODIPine   Tablet 10 milliGRAM(s) Oral daily  artificial  tears Solution 2 Drop(s) Both EYES four times a day  ascorbic acid 500 milliGRAM(s) Oral daily  calcium carbonate    500 mG (Tums) Chewable 1 Tablet(s) Chew every 12 hours  chlorhexidine 0.12% Liquid 15 milliLiter(s) Oral Mucosa every 12 hours  chlorhexidine 4% Liquid 1 Application(s) Topical <User Schedule>  dextrose 50% Injectable 12.5 Gram(s) IV Push once  dextrose 50% Injectable 25 Gram(s) IV Push once  escitalopram 10 milliGRAM(s) Oral <User Schedule>  fentaNYL   Patch  25 MICROgram(s)/Hr 1 Patch Transdermal every 72 hours  gabapentin Solution 100 milliGRAM(s) Enteral Tube at bedtime  glucagon  Injectable 1 milliGRAM(s) IntraMuscular once  hemorrhoidal Ointment      hemorrhoidal Ointment 1 Application(s) Rectal daily  insulin glargine Injectable (LANTUS) 19 Unit(s) SubCutaneous every morning  insulin lispro (ADMELOG) corrective regimen sliding scale   SubCutaneous every 6 hours  insulin regular  human recombinant 9 Unit(s) SubCutaneous <User Schedule>  insulin regular  human recombinant 15 Unit(s) SubCutaneous <User Schedule>  levothyroxine 125 MICROGram(s) Oral <User Schedule>  lidocaine   4% Patch 1 Patch Transdermal daily  lidocaine   4% Patch 1 Patch Transdermal daily  lidocaine 2% Viscous 5 milliLiter(s) Mucosal two times a day  melatonin 5 milliGRAM(s) Oral at bedtime  melatonin 1 milliGRAM(s) Oral at bedtime  multivitamin/minerals/iron Oral Solution (CENTRUM) 15 milliLiter(s) Oral daily  nystatin Powder 1 Application(s) Topical every 8 hours  pantoprazole   Suspension 40 milliGRAM(s) Enteral Tube <User Schedule>  petrolatum Ophthalmic Ointment 1 Application(s) Both EYES four times a day  predniSONE   Tablet 5 milliGRAM(s) Oral daily  QUEtiapine 12.5 milliGRAM(s) Oral <User Schedule>  silver nitrate Applicator 1 Application(s) Topical once  simethicone 80 milliGRAM(s) Chew four times a day  zinc oxide 40% Paste 1 Application(s) Topical daily        insulin glargine Injectable (LANTUS)   19 Unit(s) SubCutaneous (01-03-24 @ 09:27)    insulin lispro (ADMELOG) corrective regimen sliding scale   1 Unit(s) SubCutaneous (01-03-24 @ 12:12)   1 Unit(s) SubCutaneous (01-03-24 @ 06:17)   1 Unit(s) SubCutaneous (01-03-24 @ 00:20)   1 Unit(s) SubCutaneous (01-02-24 @ 18:01)    insulin regular  human recombinant   32 Unit(s) SubCutaneous (01-03-24 @ 06:16)    insulin regular  human recombinant   15 Unit(s) SubCutaneous (01-03-24 @ 12:12)    insulin regular  human recombinant   9 Unit(s) SubCutaneous (01-02-24 @ 18:01)    levothyroxine   125 MICROGram(s) Oral (01-03-24 @ 06:16)    predniSONE   Tablet   5 milliGRAM(s) Oral (01-03-24 @ 06:17)        PHYSICAL EXAM:  VITALS: T(C): 36.4 (01-03-24 @ 13:20)  T(F): 97.6 (01-03-24 @ 13:20), Max: 99.6 (01-03-24 @ 04:30)  HR: 94 (01-03-24 @ 13:20) (85 - 104)  BP: 118/58 (01-03-24 @ 13:20) (111/52 - 139/71)  RR:  (15 - 22)  SpO2:  (96% - 100%)  Wt(kg): --  GENERAL: NAD, TF to peg  Respiratory: Respirations unlabored trach to vent  Extremities: Warm, no edema  NEURO: Alert nod yes/no to questions    LABS:  POCT Blood Glucose.: 199 mg/dL (01-03-24 @ 11:52)  POCT Blood Glucose.: 166 mg/dL (01-03-24 @ 05:38)  POCT Blood Glucose.: 194 mg/dL (01-02-24 @ 23:56)  POCT Blood Glucose.: 180 mg/dL (01-02-24 @ 17:43)  POCT Blood Glucose.: 198 mg/dL (01-02-24 @ 12:19)  POCT Blood Glucose.: 172 mg/dL (01-02-24 @ 08:47)  POCT Blood Glucose.: 162 mg/dL (01-02-24 @ 06:05)  POCT Blood Glucose.: 144 mg/dL (01-02-24 @ 00:07)  POCT Blood Glucose.: 113 mg/dL (01-01-24 @ 17:21)  POCT Blood Glucose.: 211 mg/dL (01-01-24 @ 11:44)  POCT Blood Glucose.: 136 mg/dL (01-01-24 @ 08:25)  POCT Blood Glucose.: 142 mg/dL (01-01-24 @ 05:21)  POCT Blood Glucose.: 123 mg/dL (12-31-23 @ 23:19)  POCT Blood Glucose.: 121 mg/dL (12-31-23 @ 17:35)              11-25 Chol -- Direct LDL -- LDL calculated -- HDL -- Trig 192<H>  Thyroid Function Tests:      A1C with Estimated Average Glucose Result: 7.5 % (10-28-23 @ 07:18)    Estimated Average Glucose: 169 mg/dL (10-28-23 @ 07:18)        Diet, NPO with Tube Feed:   Tube Feeding Modality: Gastrostomy  [facility] glucose support 1.2  Total Volume for 24 Hours (mL): 1200  Continuous  Starting Tube Feed Rate mL per Hour: 60  Increase Tube Feed Rate by (mL): 0  Until Goal Tube Feed Rate (mL per Hour): 60  Tube Feed Duration (in Hours): 20  Tube Feed Start Time: 06:00  Tube Feed Stop Time: 02:00  Free Water Flush  Pump   Rate (mL per Hour): 10   Frequency: Every 2 Hours  Alfred(7 Gm Arginine/7 Gm Glut/1.2 Gm HMB     Qty per Day:  2 (01-01-24 @ 08:00) [Active]

## 2024-01-03 NOTE — PROGRESS NOTE ADULT - ASSESSMENT
74 y/o F with PMHx of T2DM, HTN, HLD, anemia, hypothyroidism, RA, fibromyalgia, remote cerebral aneurysm repair, acute hypercapnic respiratory failure now with tracheostomy, PEG tube, and bedbound (trach change 8/18, PEG change 8/14), sacral pressure ulcer, BIBEMS from home for 6 day hx of bleeding from the tracheostomy and PEG tube with associated abdominal pain, recent history of auditory and visual hallucinations, and with chronic sacral decubitus ulcer. Exam notable for mild abdominal distention with LLQ and RLQ tenderness, tracheostomy in place with no obvious bleeding, PEG tube with scant amount of dried blood, at baseline neurologic status. Imaging showing no active bleeding around tracheostomy site, however was notable for mild thickening along PEG tube tract, sacral ulcer extending to the coccyx suggestive of possible osteomyelitis, and bibasilar patchy lung opacities with volume loss. Patient admitted for respiratory support, wound care of tracheostomy and PEG, and management of sacral osteomyelitis. No further Trach and peg site bleeding noted since admission.  Pelvic MRI imaging also suggestive of Acute OM. Patient placed on long-term antibiotics with Infectious disease guidance.  Additionally treated with a 7d course of Cefepime due to PSA and Serratia tracheitis on 11/8-->11/15 and then resumed cipro/keflex.  Hospital course c/b Delirium for which Seroquel was added and home Lexapro continued. Tracheostomy changed to #9 Cuffed [place] by ENT 11/3.  Despite trach change patient remained with persistent air leak.  On 11/19, the trach was again changed due to leak and loss of volumes on vent.  Trach was changed to #8 distal cuffed [place].  Patient seen by Dermatology for back lesions.  One diagnosed as a seborrheic keratitis and the other a dermatofibroma.  Intermittent abdominal pain throughout hospital course, at times relieved with gas/BM, w/u negative, seen by GI - no recs.  12/10 pt completed cipro and keflex for osteo treatment.  12/13 moderate amounts of secretions w/ blood - tranexamic acid neb given with notable improvement.  12/18 with blood tinged secretion again - TXA 250mg neb x2 doses.  12/19 spiked fever to 102 - pancultured, negative w/u.    1/2: 72 y/o F with PMHx of T2DM, HTN, HLD, anemia, hypothyroidism, RA, fibromyalgia, remote cerebral aneurysm repair, acute hypercapnic respiratory failure now with tracheostomy, PEG tube, and bedbound (trach change 8/18, PEG change 8/14), sacral pressure ulcer, BIBEMS from home for 6 day hx of bleeding from the tracheostomy and PEG tube with associated abdominal pain, recent history of auditory and visual hallucinations, and with chronic sacral decubitus ulcer. Exam notable for mild abdominal distention with LLQ and RLQ tenderness, tracheostomy in place with no obvious bleeding, PEG tube with scant amount of dried blood, at baseline neurologic status. Imaging showing no active bleeding around tracheostomy site, however was notable for mild thickening along PEG tube tract, sacral ulcer extending to the coccyx suggestive of possible osteomyelitis, and bibasilar patchy lung opacities with volume loss. Patient admitted for respiratory support, wound care of tracheostomy and PEG, and management of sacral osteomyelitis. No further Trach and peg site bleeding noted since admission.  Pelvic MRI imaging also suggestive of Acute OM. Patient placed on long-term antibiotics with Infectious disease guidance.  Additionally treated with a 7d course of Cefepime due to PSA and Serratia tracheitis on 11/8-->11/15 and then resumed cipro/keflex.  Hospital course c/b Delirium for which Seroquel was added and home Lexapro continued. Tracheostomy changed to #9 Cuffed [place] by ENT 11/3.  Despite trach change patient remained with persistent air leak.  On 11/19, the trach was again changed due to leak and loss of volumes on vent.  Trach was changed to #8 distal cuffed [place].  Patient seen by Dermatology for back lesions.  One diagnosed as a seborrheic keratitis and the other a dermatofibroma.  Intermittent abdominal pain throughout hospital course, at times relieved with gas/BM, w/u negative, seen by GI - no recs.  12/10 pt completed cipro and keflex for osteo treatment.  12/13 moderate amounts of secretions w/ blood - tranexamic acid neb given with notable improvement.  12/18 with blood tinged secretion again - TXA 250mg neb x2 doses.  12/19 spiked fever to 102 - pancultured, negative w/u.    1/2: 74 y/o F with PMHx of T2DM, HTN, HLD, anemia, hypothyroidism, RA, fibromyalgia, remote cerebral aneurysm repair, acute hypercapnic respiratory failure now with tracheostomy, PEG tube, and bedbound (trach change 8/18, PEG change 8/14), sacral pressure ulcer, BIBEMS from home for 6 day hx of bleeding from the tracheostomy and PEG tube with associated abdominal pain, recent history of auditory and visual hallucinations, and with chronic sacral decubitus ulcer. Exam notable for mild abdominal distention with LLQ and RLQ tenderness, tracheostomy in place with no obvious bleeding, PEG tube with scant amount of dried blood, at baseline neurologic status. Imaging showing no active bleeding around tracheostomy site, however was notable for mild thickening along PEG tube tract, sacral ulcer extending to the coccyx suggestive of possible osteomyelitis, and bibasilar patchy lung opacities with volume loss. Patient admitted for respiratory support, wound care of tracheostomy and PEG, and management of sacral osteomyelitis. No further Trach and peg site bleeding noted since admission.  Pelvic MRI imaging also suggestive of Acute OM. Patient placed on long-term antibiotics with Infectious disease guidance.  Additionally treated with a 7d course of Cefepime due to PSA and Serratia tracheitis on 11/8-->11/15 and then resumed cipro/keflex.  Hospital course c/b Delirium for which Seroquel was added and home Lexapro continued. Tracheostomy changed to #9 Cuffed [place] by ENT 11/3.  Despite trach change patient remained with persistent air leak.  On 11/19, the trach was again changed due to leak and loss of volumes on vent.  Trach was changed to #8 distal cuffed [place].  Patient seen by Dermatology for back lesions.  One diagnosed as a seborrheic keratitis and the other a dermatofibroma.  Intermittent abdominal pain throughout hospital course, at times relieved with gas/BM, w/u negative, seen by GI - no recs.  12/10 pt completed cipro and keflex for osteo treatment.  12/13 moderate amounts of secretions w/ blood - tranexamic acid neb given with notable improvement.  12/18 with blood tinged secretion again - TXA 250mg neb x2 doses.  12/19 spiked fever to 102 - pancultured, negative w/u.    1/3: 74 y/o F with PMHx of T2DM, HTN, HLD, anemia, hypothyroidism, RA, fibromyalgia, remote cerebral aneurysm repair, acute hypercapnic respiratory failure now with tracheostomy, PEG tube, and bedbound (trach change 8/18, PEG change 8/14), sacral pressure ulcer, BIBEMS from home for 6 day hx of bleeding from the tracheostomy and PEG tube with associated abdominal pain, recent history of auditory and visual hallucinations, and with chronic sacral decubitus ulcer. Exam notable for mild abdominal distention with LLQ and RLQ tenderness, tracheostomy in place with no obvious bleeding, PEG tube with scant amount of dried blood, at baseline neurologic status. Imaging showing no active bleeding around tracheostomy site, however was notable for mild thickening along PEG tube tract, sacral ulcer extending to the coccyx suggestive of possible osteomyelitis, and bibasilar patchy lung opacities with volume loss. Patient admitted for respiratory support, wound care of tracheostomy and PEG, and management of sacral osteomyelitis. No further Trach and peg site bleeding noted since admission.  Pelvic MRI imaging also suggestive of Acute OM. Patient placed on long-term antibiotics with Infectious disease guidance.  Additionally treated with a 7d course of Cefepime due to PSA and Serratia tracheitis on 11/8-->11/15 and then resumed cipro/keflex.  Hospital course c/b Delirium for which Seroquel was added and home Lexapro continued. Tracheostomy changed to #9 Cuffed [place] by ENT 11/3.  Despite trach change patient remained with persistent air leak.  On 11/19, the trach was again changed due to leak and loss of volumes on vent.  Trach was changed to #8 distal cuffed [place].  Patient seen by Dermatology for back lesions.  One diagnosed as a seborrheic keratitis and the other a dermatofibroma.  Intermittent abdominal pain throughout hospital course, at times relieved with gas/BM, w/u negative, seen by GI - no recs.  12/10 pt completed cipro and keflex for osteo treatment.  12/13 moderate amounts of secretions w/ blood - tranexamic acid neb given with notable improvement.  12/18 with blood tinged secretion again - TXA 250mg neb x2 doses.  12/19 spiked fever to 102 - pancultured, negative w/u.    1/3:  Pt remained medically stable throughout the day.  Secretions with less blood tinge.  Speaking Eval performed with RT Raman, SLP Gaye and Miladis, pts daughter Marysol and pts  at the bedside. 74 y/o F with PMHx of T2DM, HTN, HLD, anemia, hypothyroidism, RA, fibromyalgia, remote cerebral aneurysm repair, acute hypercapnic respiratory failure now with tracheostomy, PEG tube, and bedbound (trach change 8/18, PEG change 8/14), sacral pressure ulcer, BIBEMS from home for 6 day hx of bleeding from the tracheostomy and PEG tube with associated abdominal pain, recent history of auditory and visual hallucinations, and with chronic sacral decubitus ulcer. Exam notable for mild abdominal distention with LLQ and RLQ tenderness, tracheostomy in place with no obvious bleeding, PEG tube with scant amount of dried blood, at baseline neurologic status. Imaging showing no active bleeding around tracheostomy site, however was notable for mild thickening along PEG tube tract, sacral ulcer extending to the coccyx suggestive of possible osteomyelitis, and bibasilar patchy lung opacities with volume loss. Patient admitted for respiratory support, wound care of tracheostomy and PEG, and management of sacral osteomyelitis. No further Trach and peg site bleeding noted since admission.  Pelvic MRI imaging also suggestive of Acute OM. Patient placed on long-term antibiotics with Infectious disease guidance.  Additionally treated with a 7d course of Cefepime due to PSA and Serratia tracheitis on 11/8-->11/15 and then resumed cipro/keflex.  Hospital course c/b Delirium for which Seroquel was added and home Lexapro continued. Tracheostomy changed to #9 Cuffed [place] by ENT 11/3.  Despite trach change patient remained with persistent air leak.  On 11/19, the trach was again changed due to leak and loss of volumes on vent.  Trach was changed to #8 distal cuffed [place].  Patient seen by Dermatology for back lesions.  One diagnosed as a seborrheic keratitis and the other a dermatofibroma.  Intermittent abdominal pain throughout hospital course, at times relieved with gas/BM, w/u negative, seen by GI - no recs.  12/10 pt completed cipro and keflex for osteo treatment.  12/13 moderate amounts of secretions w/ blood - tranexamic acid neb given with notable improvement.  12/18 with blood tinged secretion again - TXA 250mg neb x2 doses.  12/19 spiked fever to 102 - pancultured, negative w/u.  12/19 silver Nitrate applied to PEG site by GI for leakage.  Pt has since remained medically stable.  Receives continuous pulmonary toileting w/ duoneb, chest PT, and suctioning PRN.      1/3:  Pt remained medically stable throughout the day.  Secretions with less blood tinge.  Speaking Eval performed with RT Raman, SLP Gaye and Miladis, pts daughter Marysol and pts  at the bedside.  Pt with persistent cough with cuff deflation, minimally able to speak without discomfort, with increase in oral secretion production.  Will continue with trach care education with daughter.

## 2024-01-03 NOTE — PROGRESS NOTE ADULT - PROBLEM SELECTOR PLAN 5
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   - PEG Site appears macerated --> Multifactorial, possible the PEG has been too tight at home  - Peg loosened by GI at beside, no leakage or further intervention required   - recurrent RLQ abdominal pain and bleeding at the PEG site  - reevaluated by GI -- no bleeding at the PEG site  - 12/1: s/p abdominal ultrasound - limited study   - (12/3): Pt is c/o abd pain, and daughter is concerned   - AM amylase and Lipase all wnl , CT A/P 12/3 - no acute findings  - Continue Simethicone    - 12/20 silver nitrate applied to PEG site by GI 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   - PEG Site appears macerated --> Multifactorial, possible the PEG has been too tight at home  - Peg loosened by GI at beside, no leakage or further intervention required   - recurrent RLQ abdominal pain and bleeding at the PEG site  - reevaluated by GI -- no bleeding at the PEG site  - 12/1: s/p abdominal ultrasound - limited study   - (12/3): Pt is c/o abd pain, and daughter is concerned   - AM amylase and Lipase all wnl , CT A/P 12/3 - no acute findings  - Continue Simethicone    PEG Leak  - 12/20 silver nitrate applied to PEG site by GI

## 2024-01-03 NOTE — CHART NOTE - NSCHARTNOTEFT_GEN_A_CORE
MAXX LOPEZ is a 74 y/o F with PMHx of T2DM, HTN, HLD, anemia, hypothyroidism, RA, fibromyalgia, remote cerebral aneurysm repair, acute hypercapnic respiratory failure now with tracheostomy, PEG tube, and bedbound (trach change 8/18, PEG change 8/14), sacral pressure ulcer, BIBEMS from home for 6 day hx of bleeding from the tracheostomy and PEG tube with associated abdominal pain. Was seen outpatient on 10/26 by critical care Dr. Zaki Gottlieb and apparently had cultures sent at that time. Patient also noted to have chronic sacral decubitus ulcer.   Continued history:   >ED course notable for CT neck imaging showing no evidence of active bleeding around the tracheostomy site, no hematomas, and no drainable collection around the tracheostomy site.  > CT abdomen/pelvis notable for gastrostomy tube localized to the stomach with mild thickening noted along the tract; a sacral decubitus ulcer extending to the coccyx with osseous remodeling, possibly representing osteomyelitis; and bibasilar patchy opacities with volume loss in the lungs, representing atelectasis vs infection.   >stage 4 sacral pressure injury    Hospital course c/b Delirium for which Seroquel was added and home Lexapro continued. Tracheostomy changed to #9 Cuffed [place] by ENT 11/3.  Despite trach change patient remained with persistent air leak.  On 11/19, the trach was again changed due to leak and loss of volumes on vent.  Trach was changed to #8 distal cuffed Shiley.  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  >Patient seen by Dermatology for back lesions.  One diagnosed as a seborrheic keratitis and the other a dermatofibroma.   >Nov 14: ENT note--> #8 Shiley distal XLT cuffed.     Speech & Swallow : New to service.   >11/30 verbal: Daughter reports pt had a FEEs at [place] and was cleared for a diet while on a vent and had SLP at home with patient having po; mainly small sips of thin liquids and minced solids for comfort. No other information provided verbally.  >Known; Home vent settings: (300 TV/ RR 12/5 Peep/ Fio2 30%).  11/18 RR increased to 14 due to hypercarbia from trach collar trial;  Tolerates intermittent PSV 15/5 during day/ Vent HS  >Seen this admission for AAC and PMV IN LINE, see reports for more information.       TODAY, seen with spouse and daughter present. IN-LINE PMV completed at bedside in conjunction with respiratory therapist, RT Raman and NP Isaura, for purpose of communication with family. Patient English speaker however daughter present for additional interpretation as needed for primary language.     Pt encountered on vent with baseline settings for: CPAP (continuous positive airway pressure) FiO2: 30, PSV 15, PEEP: 5, volume 300; vitals with Sp02 100% and .  Cuff was partially deflated following RT suctioning trach and oral cavity. Removal of purulent secretions. Appreciated frequent coughing and reddening in the face with expectoration of secretions from oral cavity, which did not resolve. Transitioned to AC (assist control) with full and partial deflation; improvement in tolerance with partial cuff deflation vs full. Frequent suctioning persisted throughout this trial. Intermittent harsh, strained vocal quality appreciated with most often single words, as trial progressed inconsistent ability to produce short phrases with improvement in vocal quality and loudness however this was brief and fleeting with patient coughing, gagging and reddening in the face. Of note, HR elevated to 126 as a high during those moments of continued coughing, which resolved w/ partial re-inflation of cuff.   Vent restored by RT to baseline settings.    This service will continue to follow for communication with family and team. Plan for additional discussion regarding role of slp and intervention anticipated at time of d/c.       Miladis Villanueva MS CCC-SLP [employer]   extension [id number]# MAXX LOPEZ is a 72 y/o F with PMHx of T2DM, HTN, HLD, anemia, hypothyroidism, RA, fibromyalgia, remote cerebral aneurysm repair, acute hypercapnic respiratory failure now with tracheostomy, PEG tube, and bedbound (trach change 8/18, PEG change 8/14), sacral pressure ulcer, BIBEMS from home for 6 day hx of bleeding from the tracheostomy and PEG tube with associated abdominal pain. Was seen outpatient on 10/26 by critical care Dr. Zaki Gottlieb and apparently had cultures sent at that time. Patient also noted to have chronic sacral decubitus ulcer.   Continued history:   >ED course notable for CT neck imaging showing no evidence of active bleeding around the tracheostomy site, no hematomas, and no drainable collection around the tracheostomy site.  > CT abdomen/pelvis notable for gastrostomy tube localized to the stomach with mild thickening noted along the tract; a sacral decubitus ulcer extending to the coccyx with osseous remodeling, possibly representing osteomyelitis; and bibasilar patchy opacities with volume loss in the lungs, representing atelectasis vs infection.   >stage 4 sacral pressure injury    Hospital course c/b Delirium for which Seroquel was added and home Lexapro continued. Tracheostomy changed to #9 Cuffed [place] by ENT 11/3.  Despite trach change patient remained with persistent air leak.  On 11/19, the trach was again changed due to leak and loss of volumes on vent.  Trach was changed to #8 distal cuffed Shiley.  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  >Patient seen by Dermatology for back lesions.  One diagnosed as a seborrheic keratitis and the other a dermatofibroma.   >Nov 14: ENT note--> #8 Shiley distal XLT cuffed.     Speech & Swallow : New to service.   >11/30 verbal: Daughter reports pt had a FEEs at [place] and was cleared for a diet while on a vent and had SLP at home with patient having po; mainly small sips of thin liquids and minced solids for comfort. No other information provided verbally.  >Known; Home vent settings: (300 TV/ RR 12/5 Peep/ Fio2 30%).  11/18 RR increased to 14 due to hypercarbia from trach collar trial;  Tolerates intermittent PSV 15/5 during day/ Vent HS  >Seen this admission for AAC and PMV IN LINE, see reports for more information.       TODAY, seen with spouse and daughter present. IN-LINE PMV completed at bedside in conjunction with respiratory therapist, RT Raman and NP Isaura, for purpose of communication with family. Patient English speaker however daughter present for additional interpretation as needed for primary language.     Pt encountered on vent with baseline settings for: CPAP (continuous positive airway pressure) FiO2: 30, PSV 15, PEEP: 5, volume 300; vitals with Sp02 100% and .  Cuff was partially deflated following RT suctioning trach and oral cavity. Removal of purulent secretions. Appreciated frequent coughing and reddening in the face with expectoration of secretions from oral cavity, which did not resolve. Transitioned to AC (assist control) with full and partial deflation; improvement in tolerance with partial cuff deflation vs full. Frequent suctioning persisted throughout this trial. Intermittent harsh, strained vocal quality appreciated with most often single words, as trial progressed inconsistent ability to produce short phrases with improvement in vocal quality and loudness however this was brief and fleeting with patient coughing, gagging and reddening in the face. Of note, HR elevated to 126 as a high during those moments of continued coughing, which resolved w/ partial re-inflation of cuff.   Vent restored by RT to baseline settings.    This service will continue to follow for communication with family and team. Plan for additional discussion regarding role of slp and intervention anticipated at time of d/c.       Miladis Villanueva MS CCC-SLP [employer]   extension [phone]# MAXX LOPEZ is a 74 y/o F with PMHx of T2DM, HTN, HLD, anemia, hypothyroidism, RA, fibromyalgia, remote cerebral aneurysm repair, acute hypercapnic respiratory failure now with tracheostomy, PEG tube, and bedbound (trach change 8/18, PEG change 8/14), sacral pressure ulcer, BIBEMS from home for 6 day hx of bleeding from the tracheostomy and PEG tube with associated abdominal pain. Was seen outpatient on 10/26 by critical care Dr. Zaki Gottlieb and apparently had cultures sent at that time. Patient also noted to have chronic sacral decubitus ulcer.   Continued history:   >ED course notable for CT neck imaging showing no evidence of active bleeding around the tracheostomy site, no hematomas, and no drainable collection around the tracheostomy site.  > CT abdomen/pelvis notable for gastrostomy tube localized to the stomach with mild thickening noted along the tract; a sacral decubitus ulcer extending to the coccyx with osseous remodeling, possibly representing osteomyelitis; and bibasilar patchy opacities with volume loss in the lungs, representing atelectasis vs infection.   >stage 4 sacral pressure injury    Hospital course c/b Delirium for which Seroquel was added and home Lexapro continued. Tracheostomy changed to #9 Cuffed [place] by ENT 11/3.  Despite trach change patient remained with persistent air leak.  On 11/19, the trach was again changed due to leak and loss of volumes on vent.  Trach was changed to #8 distal cuffed Shiley.  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  >Patient seen by Dermatology for back lesions.  One diagnosed as a seborrheic keratitis and the other a dermatofibroma.   >Nov 14: ENT note--> #8 Shiley distal XLT cuffed.     Speech & Swallow : New to service.   >11/30 verbal: Daughter reports pt had a FEEs at [place] and was cleared for a diet while on a vent and had SLP at home with patient having po; mainly small sips of thin liquids and minced solids for comfort. No other information provided verbally.  >Known; Home vent settings: (300 TV/ RR 12/5 Peep/ Fio2 30%).  11/18 RR increased to 14 due to hypercarbia from trach collar trial;  Tolerates intermittent PSV 15/5 during day/ Vent HS  >Seen this admission for AAC and PMV IN LINE, see reports for more information.       TODAY, seen with spouse and daughter present. IN-LINE PMV completed at bedside in conjunction with respiratory therapist, RT Raman and PRITI Delarosa, for purpose of communication with family. Patient English speaker however daughter present for additional interpretation as needed for primary language.     Pt encountered on vent with baseline settings for: CPAP (continuous positive airway pressure) FiO2: 30, PSV 15, PEEP: 5, volume 300; vitals with Sp02 100% and .  Cuff was partially deflated following RT suctioning trach and oral cavity. Removal of purulent secretions. Appreciated frequent coughing and reddening in the face with expectoration of secretions from oral cavity, which did not resolve. Transitioned to AC (assist control) with full and partial deflation; improvement in tolerance with partial cuff deflation vs full. Frequent suctioning persisted throughout this trial. Intermittent harsh, strained vocal quality appreciated with most often single words, as trial progressed inconsistent ability to produce short phrases with improvement in vocal quality and loudness however this was brief and fleeting with patient coughing, gagging and reddening in the face. Of note, HR elevated to 130 as a high during those moments of continued coughing, which resolved w/ partial re-inflation of cuff.   Vent restored by RT to baseline settings.    Impression: Patient p/w with aphonia in presence of trach. Notable concern from RT regarding no cuff leak. Decreased tolerance to cuff deflation with limited phonation achieved as evidenced by increased work of breathing, reddening in the face, gagging and appearing to wretch. Continuous oral secretions requiring suctioning following cuff reinflation throughout, no cessation despite continued suctioning. Pt is not deemed to be a candidate for PMV placement in line with vent, at this time. Encourage use of AAC and low-tech device during course in conjunction with lip reading and gesturing. Suggest staff continues to anticipate Pt's needs and wants. Will defer other interventions regarding cuff in/deflation to RT and medical team/attending.    Recommendations:  -This service will continue to follow for communication with family and team.  -Speech language therapy post d/c;   -SLP to f/u on inpt basis 1 x per week.   -Maintain aspiration precaution for Pt' own secretions.    Miladis Villanueva MS CCC-SLP [employer]   extension [phone]#    D/w BERNA Narayan, PRITI Delarosa, RT Raman and JUAREZ Adam MAXX LOPEZ is a 74 y/o F with PMHx of T2DM, HTN, HLD, anemia, hypothyroidism, RA, fibromyalgia, remote cerebral aneurysm repair, acute hypercapnic respiratory failure now with tracheostomy, PEG tube, and bedbound (trach change 8/18, PEG change 8/14), sacral pressure ulcer, BIBEMS from home for 6 day hx of bleeding from the tracheostomy and PEG tube with associated abdominal pain. Was seen outpatient on 10/26 by critical care Dr. Zaki Gottlieb and apparently had cultures sent at that time. Patient also noted to have chronic sacral decubitus ulcer.   Continued history:   >ED course notable for CT neck imaging showing no evidence of active bleeding around the tracheostomy site, no hematomas, and no drainable collection around the tracheostomy site.  > CT abdomen/pelvis notable for gastrostomy tube localized to the stomach with mild thickening noted along the tract; a sacral decubitus ulcer extending to the coccyx with osseous remodeling, possibly representing osteomyelitis; and bibasilar patchy opacities with volume loss in the lungs, representing atelectasis vs infection.   >stage 4 sacral pressure injury    Hospital course c/b Delirium for which Seroquel was added and home Lexapro continued. Tracheostomy changed to #9 Cuffed [place] by ENT 11/3.  Despite trach change patient remained with persistent air leak.  On 11/19, the trach was again changed due to leak and loss of volumes on vent.  Trach was changed to #8 distal cuffed Shiley.  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  >Patient seen by Dermatology for back lesions.  One diagnosed as a seborrheic keratitis and the other a dermatofibroma.   >Nov 14: ENT note--> #8 Shiley distal XLT cuffed.     Speech & Swallow : New to service.   >11/30 verbal: Daughter reports pt had a FEEs at [place] and was cleared for a diet while on a vent and had SLP at home with patient having po; mainly small sips of thin liquids and minced solids for comfort. No other information provided verbally.  >Known; Home vent settings: (300 TV/ RR 12/5 Peep/ Fio2 30%).  11/18 RR increased to 14 due to hypercarbia from trach collar trial;  Tolerates intermittent PSV 15/5 during day/ Vent HS  >Seen this admission for AAC and PMV IN LINE, see reports for more information.       TODAY, seen with spouse and daughter present. IN-LINE PMV completed at bedside in conjunction with respiratory therapist, RT Raman and PRITI Delarosa, for purpose of communication with family. Patient English speaker however daughter present for additional interpretation as needed for primary language.     Pt encountered on vent with baseline settings for: CPAP (continuous positive airway pressure) FiO2: 30, PSV 15, PEEP: 5, volume 300; vitals with Sp02 100% and .  Cuff was partially deflated following RT suctioning trach and oral cavity. Removal of purulent secretions. Appreciated frequent coughing and reddening in the face with expectoration of secretions from oral cavity, which did not resolve. Transitioned to AC (assist control) with full and partial deflation; improvement in tolerance with partial cuff deflation vs full. Frequent suctioning persisted throughout this trial. Intermittent harsh, strained vocal quality appreciated with most often single words, as trial progressed inconsistent ability to produce short phrases with improvement in vocal quality and loudness however this was brief and fleeting with patient coughing, gagging and reddening in the face. Of note, HR elevated to 130 as a high during those moments of continued coughing, which resolved w/ partial re-inflation of cuff.   Vent restored by RT to baseline settings.    Impression: Patient p/w with aphonia in presence of trach. Notable concern from RT regarding no cuff leak. Decreased tolerance to cuff deflation with limited phonation achieved as evidenced by increased work of breathing, reddening in the face, gagging and appearing to wretch. Continuous oral secretions requiring suctioning following cuff reinflation throughout, no cessation despite continued suctioning. Pt is not deemed to be a candidate for PMV placement in line with vent, at this time. Encourage use of AAC and low-tech device during course in conjunction with lip reading and gesturing. Suggest staff continues to anticipate Pt's needs and wants. Will defer other interventions regarding cuff in/deflation to RT and medical team/attending.    Recommendations:  -This service will continue to follow for communication with family and team.  -Speech language therapy post d/c;   -SLP to f/u on inpt basis 1 x per week.   -Maintain aspiration precaution for Pt' own secretions.    Miladis Villanueva MS CCC-SLP [employer]   extension [phone]#    D/w BERNA Narayan, PRITI Delarosa, RT Raman and JUAREZ Adam

## 2024-01-03 NOTE — PROGRESS NOTE ADULT - SUBJECTIVE AND OBJECTIVE BOX
Patient is a 73y old  Female who presents with a chief complaint of PEG Bleeding (22 Dec 2023 07:35)      Interval Events:    REVIEW OF SYSTEMS:  [ ] Positive  [ ] All other systems negative  [ ] Unable to assess ROS because ________    Vital Signs Last 24 Hrs  T(C): 37.6 (01-03-24 @ 04:30), Max: 37.6 (01-03-24 @ 04:30)  T(F): 99.6 (01-03-24 @ 04:30), Max: 99.6 (01-03-24 @ 04:30)  HR: 86 (01-03-24 @ 06:14) (78 - 107)  BP: 130/62 (01-03-24 @ 04:30) (111/52 - 139/71)  RR: 18 (01-03-24 @ 04:30) (15 - 40)  SpO2: 99% (01-03-24 @ 06:14) (96% - 100%)    PHYSICAL EXAM:  HEENT:   [ ]Tracheostomy:  [ ]Pupils equal  [ ]No oral lesions  [ ]Abnormal    SKIN  [ ]No Rash  [ ] Abnormal  [ ] pressure    CARDIAC  [ ]Regular  [ ]Abnormal    PULMONARY  [ ]Bilateral Clear Breath Sounds  [ ]Normal Excursion  [ ]Abnormal    GI  [ ]PEG      [ ] +BS		              [ ]Soft, nondistended, nontender	  [ ]Abnormal    MUSCULOSKELETAL                                   [ ]Bedbound                 [ ]Abnormal    [ ]Ambulatory/OOB to chair                           EXTREMITIES                                         [ ]Normal  [ ]Edema                           NEUROLOGIC  [ ] Normal, non focal  [ ] Focal findings:    PSYCHIATRIC  [ ]Alert and appropriate  [ ] Sedated	 [ ]Agitated    :  Mcbride: [ ] Yes, if yes: Date of Placement:                   [  ] No    LINES: Central Lines [ ] Yes, if yes: Date of Placement                                     [  ] No    HOSPITAL MEDICATIONS:  MEDICATIONS  (STANDING):  albuterol/ipratropium for Nebulization 3 milliLiter(s) Nebulizer every 6 hours  amLODIPine   Tablet 10 milliGRAM(s) Oral daily  artificial  tears Solution 2 Drop(s) Both EYES four times a day  ascorbic acid 500 milliGRAM(s) Oral daily  calcium carbonate    500 mG (Tums) Chewable 1 Tablet(s) Chew every 12 hours  chlorhexidine 0.12% Liquid 15 milliLiter(s) Oral Mucosa every 12 hours  chlorhexidine 4% Liquid 1 Application(s) Topical <User Schedule>  dextrose 50% Injectable 12.5 Gram(s) IV Push once  dextrose 50% Injectable 25 Gram(s) IV Push once  escitalopram 10 milliGRAM(s) Oral <User Schedule>  fentaNYL   Patch  25 MICROgram(s)/Hr 1 Patch Transdermal every 72 hours  gabapentin Solution 100 milliGRAM(s) Enteral Tube at bedtime  glucagon  Injectable 1 milliGRAM(s) IntraMuscular once  hemorrhoidal Ointment 1 Application(s) Rectal daily  hemorrhoidal Ointment      insulin glargine Injectable (LANTUS) 19 Unit(s) SubCutaneous every morning  insulin lispro (ADMELOG) corrective regimen sliding scale   SubCutaneous every 6 hours  insulin regular  human recombinant 9 Unit(s) SubCutaneous <User Schedule>  insulin regular  human recombinant 32 Unit(s) SubCutaneous <User Schedule>  insulin regular  human recombinant 15 Unit(s) SubCutaneous <User Schedule>  levothyroxine 125 MICROGram(s) Oral <User Schedule>  lidocaine   4% Patch 1 Patch Transdermal daily  lidocaine   4% Patch 1 Patch Transdermal daily  lidocaine 2% Viscous 5 milliLiter(s) Mucosal two times a day  melatonin 5 milliGRAM(s) Oral at bedtime  melatonin 1 milliGRAM(s) Oral at bedtime  multivitamin/minerals/iron Oral Solution (CENTRUM) 15 milliLiter(s) Oral daily  nystatin Powder 1 Application(s) Topical every 8 hours  pantoprazole   Suspension 40 milliGRAM(s) Enteral Tube <User Schedule>  petrolatum Ophthalmic Ointment 1 Application(s) Both EYES four times a day  predniSONE   Tablet 5 milliGRAM(s) Oral daily  QUEtiapine 12.5 milliGRAM(s) Oral <User Schedule>  silver nitrate Applicator 1 Application(s) Topical once  simethicone 80 milliGRAM(s) Chew four times a day  zinc oxide 40% Paste 1 Application(s) Topical daily    MEDICATIONS  (PRN):  acetaminophen   Oral Liquid .. 1000 milliGRAM(s) Enteral Tube every 6 hours PRN Temp greater or equal to 38C (100.4F), Mild Pain (1 - 3)  benzocaine 20% Spray 1 Spray(s) Topical four times a day PRN Cough  diclofenac sodium 1% Gel 2 Gram(s) Topical four times a day PRN pain  diphenhydrAMINE Elixir 25 milliGRAM(s) Oral every 6 hours PRN Rash and/or Itching  guaifenesin/dextromethorphan Oral Liquid 10 milliLiter(s) Oral every 6 hours PRN Cough  oxyCODONE    Solution 2.5 milliGRAM(s) Oral every 6 hours PRN Moderate Pain (4 - 6)  sodium chloride 0.65% Nasal 1 Spray(s) Both Nostrils every 6 hours PRN Nasal Congestion      LABS:                  CAPILLARY BLOOD GLUCOSE    MICROBIOLOGY:     RADIOLOGY:  [ ] Reviewed and interpreted by me    Mode: AC/ CMV (Assist Control/ Continuous Mandatory Ventilation)  RR (machine): 12  TV (machine): 300  FiO2: 30  PEEP: 5  ITime: 1  MAP: 9  PIP: 26   Patient is a 73y old  Female who presents with a chief complaint of PEG Bleeding (22 Dec 2023 07:35)      Interval Events:  No acute events overnight.    REVIEW OF SYSTEMS:  [ ] Positive  [X] All other systems negative  [ ] Unable to assess ROS because ________    Vital Signs Last 24 Hrs  T(C): 37.6 (01-03-24 @ 04:30), Max: 37.6 (01-03-24 @ 04:30)  T(F): 99.6 (01-03-24 @ 04:30), Max: 99.6 (01-03-24 @ 04:30)  HR: 86 (01-03-24 @ 06:14) (78 - 107)  BP: 130/62 (01-03-24 @ 04:30) (111/52 - 139/71)  RR: 18 (01-03-24 @ 04:30) (15 - 40)  SpO2: 99% (01-03-24 @ 06:14) (96% - 100%)    PHYSICAL EXAM:  HEENT:   [X]Tracheostomy: #8 distal XLT Cuffed Shiley  [X]Pupils equal  [ ]No oral lesions  [ ]Abnormal    SKIN  [X]No Rash  [ ] Abnormal  [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    CARDIAC  [X]Regular  [ ]Abnormal    PULMONARY  [X]Bilateral Clear Breath Sounds  [ ]Normal Excursion  [ ]Abnormal    GI  [X]PEG     [X] +BS		              [X]Soft, nondistended, nontender  [ ]Abnormal    MUSCULOSKELETAL                                   [X]Bedbound                 [ ]Abnormal    [ ]Ambulatory/OOB to chair                           EXTREMITIES                                         [X]Normal  [ ]Edema                           NEUROLOGIC  [ ] Normal, non focal  [X] Focal findings: opens eyes spontaneously, follows commands on all 4 extremities    PSYCHIATRIC  [X]Alert and appropriate  [ ] Sedated	 [ ]Agitated    :  Mcbride: [ ] Yes, if yes: Date of Placement:                   [X] No    LINES: Central Lines [ ] Yes, if yes: Date of Placement                                     [X] No    HOSPITAL MEDICATIONS:  MEDICATIONS  (STANDING):  albuterol/ipratropium for Nebulization 3 milliLiter(s) Nebulizer every 6 hours  amLODIPine   Tablet 10 milliGRAM(s) Oral daily  artificial  tears Solution 2 Drop(s) Both EYES four times a day  ascorbic acid 500 milliGRAM(s) Oral daily  calcium carbonate    500 mG (Tums) Chewable 1 Tablet(s) Chew every 12 hours  chlorhexidine 0.12% Liquid 15 milliLiter(s) Oral Mucosa every 12 hours  chlorhexidine 4% Liquid 1 Application(s) Topical <User Schedule>  dextrose 50% Injectable 12.5 Gram(s) IV Push once  dextrose 50% Injectable 25 Gram(s) IV Push once  escitalopram 10 milliGRAM(s) Oral <User Schedule>  fentaNYL   Patch  25 MICROgram(s)/Hr 1 Patch Transdermal every 72 hours  gabapentin Solution 100 milliGRAM(s) Enteral Tube at bedtime  glucagon  Injectable 1 milliGRAM(s) IntraMuscular once  hemorrhoidal Ointment 1 Application(s) Rectal daily  hemorrhoidal Ointment      insulin glargine Injectable (LANTUS) 19 Unit(s) SubCutaneous every morning  insulin lispro (ADMELOG) corrective regimen sliding scale   SubCutaneous every 6 hours  insulin regular  human recombinant 9 Unit(s) SubCutaneous <User Schedule>  insulin regular  human recombinant 32 Unit(s) SubCutaneous <User Schedule>  insulin regular  human recombinant 15 Unit(s) SubCutaneous <User Schedule>  levothyroxine 125 MICROGram(s) Oral <User Schedule>  lidocaine   4% Patch 1 Patch Transdermal daily  lidocaine   4% Patch 1 Patch Transdermal daily  lidocaine 2% Viscous 5 milliLiter(s) Mucosal two times a day  melatonin 5 milliGRAM(s) Oral at bedtime  melatonin 1 milliGRAM(s) Oral at bedtime  multivitamin/minerals/iron Oral Solution (CENTRUM) 15 milliLiter(s) Oral daily  nystatin Powder 1 Application(s) Topical every 8 hours  pantoprazole   Suspension 40 milliGRAM(s) Enteral Tube <User Schedule>  petrolatum Ophthalmic Ointment 1 Application(s) Both EYES four times a day  predniSONE   Tablet 5 milliGRAM(s) Oral daily  QUEtiapine 12.5 milliGRAM(s) Oral <User Schedule>  silver nitrate Applicator 1 Application(s) Topical once  simethicone 80 milliGRAM(s) Chew four times a day  zinc oxide 40% Paste 1 Application(s) Topical daily    MEDICATIONS  (PRN):  acetaminophen   Oral Liquid .. 1000 milliGRAM(s) Enteral Tube every 6 hours PRN Temp greater or equal to 38C (100.4F), Mild Pain (1 - 3)  benzocaine 20% Spray 1 Spray(s) Topical four times a day PRN Cough  diclofenac sodium 1% Gel 2 Gram(s) Topical four times a day PRN pain  diphenhydrAMINE Elixir 25 milliGRAM(s) Oral every 6 hours PRN Rash and/or Itching  guaifenesin/dextromethorphan Oral Liquid 10 milliLiter(s) Oral every 6 hours PRN Cough  oxyCODONE    Solution 2.5 milliGRAM(s) Oral every 6 hours PRN Moderate Pain (4 - 6)  sodium chloride 0.65% Nasal 1 Spray(s) Both Nostrils every 6 hours PRN Nasal Congestion      LABS:                  CAPILLARY BLOOD GLUCOSE    MICROBIOLOGY:     RADIOLOGY:  [ ] Reviewed and interpreted by me    Mode: AC/ CMV (Assist Control/ Continuous Mandatory Ventilation)  RR (machine): 12  TV (machine): 300  FiO2: 30  PEEP: 5  ITime: 1  MAP: 9  PIP: 26

## 2024-01-03 NOTE — PROGRESS NOTE ADULT - SUBJECTIVE AND OBJECTIVE BOX
[facility]-- WOUND TEAM -- FOLLOW UP NOTE  --------------------------------------------------------------------------------    24 hour events/subjective:    afebrile  more alert  tolerating TF w/o vomiting  tolerating dressings     no odor pain excess drainage  incontinent-         Diet:  Diet, NPO with Tube Feed:   Tube Feeding Modality: Gastrostomy  PernixData glucose support 1.2  Total Volume for 24 Hours (mL): 1200  Continuous  Starting Tube Feed Rate mL per Hour: 60  Increase Tube Feed Rate by (mL): 0  Until Goal Tube Feed Rate (mL per Hour): 60  Tube Feed Duration (in Hours): 20  Tube Feed Start Time: 06:00  Tube Feed Stop Time: 02:00  Free Water Flush  Pump   Rate (mL per Hour): 10   Frequency: Every 2 Hours  Alfred(7 Gm Arginine/7 Gm Glut/1.2 Gm HMB     Qty per Day:  2 (01-01-24 @ 08:00)      ROS: pt unable to offer    ALLERGIES & MEDICATIONS  --------------------------------------------------------------------------------  Allergies  penicillin (Unknown)  heparin (Unknown)  Tagamet (Unknown)  pineapple (Unknown)  walnut (Unknown)  Andressa Rawls Hazelcharlene (Unknown)  Lyrica (Unknown)      STANDING INPATIENT MEDICATIONS  albuterol/ipratropium for Nebulization 3 milliLiter(s) Nebulizer every 6 hours  amLODIPine   Tablet 10 milliGRAM(s) Oral daily  artificial  tears Solution 2 Drop(s) Both EYES four times a day  ascorbic acid 500 milliGRAM(s) Oral daily  calcium carbonate    500 mG (Tums) Chewable 1 Tablet(s) Chew every 12 hours  chlorhexidine 0.12% Liquid 15 milliLiter(s) Oral Mucosa every 12 hours  chlorhexidine 4% Liquid 1 Application(s) Topical <User Schedule>  dextrose 50% Injectable 12.5 Gram(s) IV Push once  dextrose 50% Injectable 25 Gram(s) IV Push once  escitalopram 10 milliGRAM(s) Oral <User Schedule>  fentaNYL   Patch  25 MICROgram(s)/Hr 1 Patch Transdermal every 72 hours  gabapentin Solution 100 milliGRAM(s) Enteral Tube at bedtime  glucagon  Injectable 1 milliGRAM(s) IntraMuscular once    hemorrhoidal Ointment 1 Application(s) Rectal daily  insulin glargine Injectable (LANTUS) 19 Unit(s) SubCutaneous every morning  insulin lispro (ADMELOG) corrective regimen sliding scale   SubCutaneous every 6 hours  insulin regular  human recombinant 9 Unit(s) SubCutaneous <User Schedule>  insulin regular  human recombinant 15 Unit(s) SubCutaneous <User Schedule>  levothyroxine 125 MICROGram(s) Oral <User Schedule>  lidocaine   4% Patch 1 Patch Transdermal daily  lidocaine   4% Patch 1 Patch Transdermal daily  lidocaine 2% Viscous 5 milliLiter(s) Mucosal two times a day  melatonin 1 milliGRAM(s) Oral at bedtime  melatonin 5 milliGRAM(s) Oral at bedtime  multivitamin/minerals/iron Oral Solution (CENTRUM) 15 milliLiter(s) Oral daily  nystatin Powder 1 Application(s) Topical every 8 hours  pantoprazole   Suspension 40 milliGRAM(s) Enteral Tube <User Schedule>  petrolatum Ophthalmic Ointment 1 Application(s) Both EYES four times a day  predniSONE   Tablet 5 milliGRAM(s) Oral daily  QUEtiapine 12.5 milliGRAM(s) Oral <User Schedule>  silver nitrate Applicator 1 Application(s) Topical once  simethicone 80 milliGRAM(s) Chew four times a day  zinc oxide 40% Paste 1 Application(s) Topical daily      PRN INPATIENT MEDICATION  acetaminophen  Oral Liquid 1000 milliGRAM(s) Enteral Tube every 6 hours PRN  benzocaine 20% Spray 1 Spray(s) Topical four times a day PRN  diclofenac sodium 1% Gel 2 Gram(s) Topical four times a day PRN  diphenhydrAMINE Elixir 25 milliGRAM(s) Oral every 6 hours PRN  guaifenesin/dextromethorphan Oral Liquid 10 milliLiter(s) Oral every 6 hours PRN  oxyCODONE Solution 2.5 milliGRAM(s) Oral every 6 hours PRN  sodium chloride 0.65% Nasal 1 Spray(s) Both Nostrils every 6 hours PRN        VITALS/PHYSICAL EXAM  --------------------------------------------------------------------------------  T(C): 36.4 (01-03-24 @ 13:20), Max: 37.6 (01-03-24 @ 04:30)  HR: 94 (01-03-24 @ 13:20) (85 - 104)  BP: 118/58 (01-03-24 @ 13:20) (111/52 - 139/71)  RR: 19 (01-03-24 @ 13:20) (15 - 22)  SpO2: 99% (01-03-24 @ 13:20) (97% - 100%)  Wt(kg): --      NAD,  Alert, frail,  WD/ WN/ WG  Total Care Sport bed  HEENT:  NC/AT, EOMI, sclera clear, mucosa moist, throat clear, trach       w/o secretions or peritrach skin irration  Gastrointestinal: soft NT/ND (+)PEG w/o drainage or skin irration  : (+) primafit  Neurology:  nonverbal, no follow commands, paraplegic  Psych: calm/ appropriate  Musculoskeletal:  pROM, no deformities/ contractures   Vascular: BLE equally warm,  no cyanosis, clubbing nor acute ischemia  Skin:  moist w/ good turgor  Sacral stage 4 pressure injury  4cm x 2cm x 0.2cm   moist granular wound bed   palpable but not exposed bone  no blistering   (+) serosanguinous drainage  No odor, erythema, increased warmth, tenderness, induration, fluctuance, nor crepitus      LABS/ CULTURES/ RADIOLOGY:    CAPILLARY BLOOD GLUCOSE  POCT Blood Glucose.: 199 mg/dL (03 Jan 2024 11:52)  POCT Blood Glucose.: 166 mg/dL (03 Jan 2024 05:38)  POCT Blood Glucose.: 194 mg/dL (02 Jan 2024 23:56)  POCT Blood Glucose.: 180 mg/dL (02 Jan 2024 17:43)    A1C with Estimated Average Glucose Result: 7.5 % (10-28-23 @ 07:18)   [facility]-- WOUND TEAM -- FOLLOW UP NOTE  --------------------------------------------------------------------------------    24 hour events/subjective:    afebrile  more alert  tolerating TF w/o vomiting  tolerating dressings     no odor pain excess drainage  incontinent-         Diet:  Diet, NPO with Tube Feed:   Tube Feeding Modality: Gastrostomy  Agilvax glucose support 1.2  Total Volume for 24 Hours (mL): 1200  Continuous  Starting Tube Feed Rate mL per Hour: 60  Increase Tube Feed Rate by (mL): 0  Until Goal Tube Feed Rate (mL per Hour): 60  Tube Feed Duration (in Hours): 20  Tube Feed Start Time: 06:00  Tube Feed Stop Time: 02:00  Free Water Flush  Pump   Rate (mL per Hour): 10   Frequency: Every 2 Hours  Alfred(7 Gm Arginine/7 Gm Glut/1.2 Gm HMB     Qty per Day:  2 (01-01-24 @ 08:00)      ROS: pt unable to offer    ALLERGIES & MEDICATIONS  --------------------------------------------------------------------------------  Allergies  penicillin (Unknown)  heparin (Unknown)  Tagamet (Unknown)  pineapple (Unknown)  walnut (Unknown)  Andressa Rawls Hazelcharlene (Unknown)  Lyrica (Unknown)      STANDING INPATIENT MEDICATIONS  albuterol/ipratropium for Nebulization 3 milliLiter(s) Nebulizer every 6 hours  amLODIPine   Tablet 10 milliGRAM(s) Oral daily  artificial  tears Solution 2 Drop(s) Both EYES four times a day  ascorbic acid 500 milliGRAM(s) Oral daily  calcium carbonate    500 mG (Tums) Chewable 1 Tablet(s) Chew every 12 hours  chlorhexidine 0.12% Liquid 15 milliLiter(s) Oral Mucosa every 12 hours  chlorhexidine 4% Liquid 1 Application(s) Topical <User Schedule>  dextrose 50% Injectable 12.5 Gram(s) IV Push once  dextrose 50% Injectable 25 Gram(s) IV Push once  escitalopram 10 milliGRAM(s) Oral <User Schedule>  fentaNYL   Patch  25 MICROgram(s)/Hr 1 Patch Transdermal every 72 hours  gabapentin Solution 100 milliGRAM(s) Enteral Tube at bedtime  glucagon  Injectable 1 milliGRAM(s) IntraMuscular once    hemorrhoidal Ointment 1 Application(s) Rectal daily  insulin glargine Injectable (LANTUS) 19 Unit(s) SubCutaneous every morning  insulin lispro (ADMELOG) corrective regimen sliding scale   SubCutaneous every 6 hours  insulin regular  human recombinant 9 Unit(s) SubCutaneous <User Schedule>  insulin regular  human recombinant 15 Unit(s) SubCutaneous <User Schedule>  levothyroxine 125 MICROGram(s) Oral <User Schedule>  lidocaine   4% Patch 1 Patch Transdermal daily  lidocaine   4% Patch 1 Patch Transdermal daily  lidocaine 2% Viscous 5 milliLiter(s) Mucosal two times a day  melatonin 1 milliGRAM(s) Oral at bedtime  melatonin 5 milliGRAM(s) Oral at bedtime  multivitamin/minerals/iron Oral Solution (CENTRUM) 15 milliLiter(s) Oral daily  nystatin Powder 1 Application(s) Topical every 8 hours  pantoprazole   Suspension 40 milliGRAM(s) Enteral Tube <User Schedule>  petrolatum Ophthalmic Ointment 1 Application(s) Both EYES four times a day  predniSONE   Tablet 5 milliGRAM(s) Oral daily  QUEtiapine 12.5 milliGRAM(s) Oral <User Schedule>  silver nitrate Applicator 1 Application(s) Topical once  simethicone 80 milliGRAM(s) Chew four times a day  zinc oxide 40% Paste 1 Application(s) Topical daily      PRN INPATIENT MEDICATION  acetaminophen  Oral Liquid 1000 milliGRAM(s) Enteral Tube every 6 hours PRN  benzocaine 20% Spray 1 Spray(s) Topical four times a day PRN  diclofenac sodium 1% Gel 2 Gram(s) Topical four times a day PRN  diphenhydrAMINE Elixir 25 milliGRAM(s) Oral every 6 hours PRN  guaifenesin/dextromethorphan Oral Liquid 10 milliLiter(s) Oral every 6 hours PRN  oxyCODONE Solution 2.5 milliGRAM(s) Oral every 6 hours PRN  sodium chloride 0.65% Nasal 1 Spray(s) Both Nostrils every 6 hours PRN        VITALS/PHYSICAL EXAM  --------------------------------------------------------------------------------  T(C): 36.4 (01-03-24 @ 13:20), Max: 37.6 (01-03-24 @ 04:30)  HR: 94 (01-03-24 @ 13:20) (85 - 104)  BP: 118/58 (01-03-24 @ 13:20) (111/52 - 139/71)  RR: 19 (01-03-24 @ 13:20) (15 - 22)  SpO2: 99% (01-03-24 @ 13:20) (97% - 100%)  Wt(kg): --      NAD,  Alert, frail,  WD/ WN/ WG  Total Care Sport bed  HEENT:  NC/AT, EOMI, sclera clear, mucosa moist, throat clear, trach       w/o secretions or peritrach skin irration  Gastrointestinal: soft NT/ND (+)PEG w/o drainage or skin irration  : (+) primafit  Neurology:  nonverbal, no follow commands, paraplegic  Psych: calm/ appropriate  Musculoskeletal:  pROM, no deformities/ contractures   Vascular: BLE equally warm,  no cyanosis, clubbing nor acute ischemia  Skin:  moist w/ good turgor  Sacral stage 4 pressure injury  4cm x 2cm x 0.2cm   moist granular wound bed   palpable but not exposed bone  no blistering   (+) serosanguinous drainage  No odor, erythema, increased warmth, tenderness, induration, fluctuance, nor crepitus      LABS/ CULTURES/ RADIOLOGY:    CAPILLARY BLOOD GLUCOSE  POCT Blood Glucose.: 199 mg/dL (03 Jan 2024 11:52)  POCT Blood Glucose.: 166 mg/dL (03 Jan 2024 05:38)  POCT Blood Glucose.: 194 mg/dL (02 Jan 2024 23:56)  POCT Blood Glucose.: 180 mg/dL (02 Jan 2024 17:43)    A1C with Estimated Average Glucose Result: 7.5 % (10-28-23 @ 07:18)

## 2024-01-03 NOTE — PROGRESS NOTE ADULT - NS ATTEND AMEND GEN_ALL_CORE FT
73F with a h/o DM, HTN, HLD, anemia, hypothyroidism, RA, fibromyalgia, remote cerebral aneurysm with repair, hypercapnic respiratory failure s/p tracheostomy/PEG, bedbound, sacral pressure ulcer. Admitted w/ bleeding from tracheostomy and PEG w/ associated abdominal pain, recent hx of auditory/visual hallucinations. Imaging showed mild thickening along PEG tube tract and sacral ulcer extending to coccyx suggestive of acute osteomyelitis.      No acute events overnight  Remains on chronic mech vent, tolerating PS trials during the day for periods of time  Tolerating PEG feeds  Sacral OM s/p 6 week course of Cipro and Keflex as per ID - completed 12/10/23. Appreciate wound care follow up.   c/w current pain regimen - in setting of fibromyalgia and pain from wound  Medically stable for discharge.     Time-based billing (NON-critical care).     38 minutes spent on total encounter. The necessity of the time spent during the encounter on this date of service was due to:     review of the medical record, medical decision making as above, discussion w/ interdisciplinary team.

## 2024-01-03 NOTE — PROGRESS NOTE ADULT - PROBLEM SELECTOR PLAN 2
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   - S/p CT abd/pelvis (10/28): Sacral decubitus ulcer extending to the coccyx with osseous remodeling and pelvic MRI or bone scan to recc to exclude osteomyelitis.  - 10/30 wound care note: Sacral stage 4 pressure injury 4.5cm x 2.5cm x 1.5cm w/ lip of undermining circumferentially greatest 9-12 of 3cm.  - MRI pelvis 10/30 with Osteomyelitis, completed Cefepime for 7 days, Vancomycin d/marli   - 11/10: resumed Cipro 500mg q 12 and Keflex 500mg q 6 until 12/10.  - 11/20: Changed to IV Cipro 400 q12 as recommended by ID pharmacist due to multiple drug interactions when given via PEG  - 11/28 wound care note: Sacral stage 4pressure injury 4.5cm x 2.5cm x 0.5cm, moist granular wound bed   palpable but not exposed bone no blistering, (+) serosanguinous drainage. No odor, erythema, increased warmth, tenderness, induration, fluctuance, nor crepitus.  - 12/3 wound culture ordered - d/c'd as no need for culture, wound improving, no signs of infection  - 12/10 completed cipro and keflex x5 week course  - wound care following 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   - S/p CT abd/pelvis (10/28): Sacral decubitus ulcer extending to the coccyx with osseous remodeling and pelvic MRI or bone scan to recc to exclude osteomyelitis.  - 10/30 wound care note: Sacral stage 4 pressure injury 4.5cm x 2.5cm x 1.5cm w/ lip of undermining circumferentially greatest 9-12 of 3cm.  - MRI pelvis 10/30 with Osteomyelitis, completed Cefepime for 7 days, Vancomycin d/marli   - 11/10: resumed Cipro 500mg q 12 and Keflex 500mg q 6 until 12/10.  - 11/20: Changed to IV Cipro 400 q12 as recommended by ID pharmacist due to multiple drug interactions when given via PEG  - 11/28 wound care note: Sacral stage 4pressure injury 4.5cm x 2.5cm x 0.5cm, moist granular wound bed   palpable but not exposed bone no blistering, (+) serosanguinous drainage. No odor, erythema, increased warmth, tenderness, induration, fluctuance, nor crepitus.  - 12/3 wound culture ordered - d/c'd as no need for culture, wound improving, no signs of infection  - 12/10 completed cipro and keflex x5 week course  - wound improving   - wound care following

## 2024-01-03 NOTE — PROGRESS NOTE ADULT - ASSESSMENT
74 y/o F w/h/o T2DM with unknown control due to anemia of chronic disease skewing A1C levels. On Levemir and Humalog insulin PTA. Unknown DM complications. Also h/o HTN, HLD, anemia, hypothyroidism, RA, fibromyalgia, remote cerebral aneurysm repair, acute hypercapnic respiratory failure now with tracheostomy, PEG tube, and bedbound (trach change 8/18, PEG change 8/14), sacral pressure ulcer, BIBEMS from home for 6 day hx of bleeding from the tracheostomy and PEG tube with associated abdominal pain> now resolved. Endocrinology consulted for hyperglycemia. BG Goal 100-180mg/dl   Tolerating tube feeding at goal ( 60 ml, 6am- 2 am ),        Type 2 diabetes mellitus with hyperglycemia, with long-term current use of insulin.   ·  Plan:   - FS BG monitoring [id number] while on TFs/NPO   - FBG at goal continue with  lantus 19 units sc qAM   - AC Lunch above goal increase to  Regular insulin 34units sc at 6am, continue with 15 units sc at 12 noon and 9 units sc at 6pm. PLEASE DO NOT HOLD IF PATIENT IS ON TUBE FEEDS (hold only if TF are stopped). Can decrease/adjust dose if there is a concern for hypoglycemia.   - C/w low dose admelog correction scale every 6 hours  - please inform endocrine team if there are any changes in tube feeding( formula or rate)     DISCHARGE:  - Will be determined according to BG/insulin needs/TFs and steroid therapy at time of discharge.    - Needs telemedicine as outpatient due to bedbound status. Can follow up at [facility]. [address] [place] [address] [address]. Phone . Make sure pt has Rx for all DM supplies and insulin.     Problem/Plan - 2:  ·  Problem: Secondary adrenal insufficiency.   ·  Plan: - Due to chronic steroid use pt is at risk of tertiary AI, please do not abruptly discontinue or hold steroids as this can result in an adrenal crisis   - c/w prednisone 5mg daily  - hemodynamically stable at present time.     Problem/Plan - 3:  ·  Problem: Hypothyroidism.   ·  Plan: TSH and free thyroxine wnl 11/28/23  Recommendations:   - continue levothyroxine 125mcg daily  - Please make sure LT4 is given on an empty stomach at least 30 mins to 1 hour apart from other meds and food/TFs to ensure med absorption. DO NOT GIVE WITH VITAMINS/ANTACIDS.     Problem/Plan - 4:  ·  Problem: HTN (hypertension).   ·  Plan: - BP goal <130/80  - Manage per primary team.  - currently on amlodipine 10mg daily.     Problem/Plan - 5:  ·  Problem: HLD (hyperlipidemia).   ·  Plan: - LDL goal <70 due to DM  - Outpatient fasting lipid profile   - Consider moderate intensity Statin if not contraindicated and based on risks/benefits for pt  - Manage per primary team        discussed with pt and aide  Contact via Microsoft Teams during business hours  To reach covering provider access AMION via sunrise tools  For Urgent matters/after-hours/weekends/holidays please page endocrine fellow on call   For nonurgent matters please email [email or web address]    Please note that this patient may be followed by different provider tomorrow.  Notify endocrine 24 hours prior to discharge for final recommendations              72 y/o F w/h/o T2DM with unknown control due to anemia of chronic disease skewing A1C levels. On Levemir and Humalog insulin PTA. Unknown DM complications. Also h/o HTN, HLD, anemia, hypothyroidism, RA, fibromyalgia, remote cerebral aneurysm repair, acute hypercapnic respiratory failure now with tracheostomy, PEG tube, and bedbound (trach change 8/18, PEG change 8/14), sacral pressure ulcer, BIBEMS from home for 6 day hx of bleeding from the tracheostomy and PEG tube with associated abdominal pain> now resolved. Endocrinology consulted for hyperglycemia. BG Goal 100-180mg/dl   Tolerating tube feeding at goal ( 60 ml, 6am- 2 am ),        Type 2 diabetes mellitus with hyperglycemia, with long-term current use of insulin.   ·  Plan:   - FS BG monitoring [id number] while on TFs/NPO   - FBG at goal continue with  lantus 19 units sc qAM   - AC Lunch above goal increase to  Regular insulin 34units sc at 6am, continue with 15 units sc at 12 noon and 9 units sc at 6pm. PLEASE DO NOT HOLD IF PATIENT IS ON TUBE FEEDS (hold only if TF are stopped). Can decrease/adjust dose if there is a concern for hypoglycemia.   - C/w low dose admelog correction scale every 6 hours  - please inform endocrine team if there are any changes in tube feeding( formula or rate)     DISCHARGE:  - Will be determined according to BG/insulin needs/TFs and steroid therapy at time of discharge.    - Needs telemedicine as outpatient due to bedbound status. Can follow up at [facility]. [address] [place] [address] [address]. Phone . Make sure pt has Rx for all DM supplies and insulin.     Problem/Plan - 2:  ·  Problem: Secondary adrenal insufficiency.   ·  Plan: - Due to chronic steroid use pt is at risk of tertiary AI, please do not abruptly discontinue or hold steroids as this can result in an adrenal crisis   - c/w prednisone 5mg daily  - hemodynamically stable at present time.     Problem/Plan - 3:  ·  Problem: Hypothyroidism.   ·  Plan: TSH and free thyroxine wnl 11/28/23  Recommendations:   - continue levothyroxine 125mcg daily  - Please make sure LT4 is given on an empty stomach at least 30 mins to 1 hour apart from other meds and food/TFs to ensure med absorption. DO NOT GIVE WITH VITAMINS/ANTACIDS.     Problem/Plan - 4:  ·  Problem: HTN (hypertension).   ·  Plan: - BP goal <130/80  - Manage per primary team.  - currently on amlodipine 10mg daily.     Problem/Plan - 5:  ·  Problem: HLD (hyperlipidemia).   ·  Plan: - LDL goal <70 due to DM  - Outpatient fasting lipid profile   - Consider moderate intensity Statin if not contraindicated and based on risks/benefits for pt  - Manage per primary team        discussed with pt and aide  Contact via Microsoft Teams during business hours  To reach covering provider access AMION via sunrise tools  For Urgent matters/after-hours/weekends/holidays please page endocrine fellow on call   For nonurgent matters please email [email or web address]    Please note that this patient may be followed by different provider tomorrow.  Notify endocrine 24 hours prior to discharge for final recommendations

## 2024-01-03 NOTE — PROGRESS NOTE ADULT - PROBLEM SELECTOR PLAN 4
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  - Vent:  volume /12/40%/+5  - Tolerates intermittent PSV 15/5 during day/Vent HS  - Airway Clearance: Duoneb, Hypersal, Chest PT, PRN suctioning   - 12/29:  Unable to tolerate inline speaking valve.  Was able to phonate with partial cuff deflation thus encouraged to be used when family present ot facilitate communication.    Tracheal Bleeding (Intermittent)-->resolved since admission   - S/p CT Angio neck: No evidence of active bleeding around tracheostomy site. No hematoma.  No collection   - Seen by ENT; Car/vero and b/l bronchi visualized without any trauma, small amount of blood tinged secretions resting at beginning of right bronchus not actively bleeding.  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  Was able to phonate with partial cuff deflation thus encouraged to be used when family present ot facilitate communication.  - Vent:  volume /12/40%/+5  - Tolerates intermittent PSV 15/5 during day/Vent HS  - Airway Clearance: Duoneb, Hypersal, Chest PT, PRN suctioning     Tracheal Bleeding (Intermittent)-->resolved since admission   - S/p CT Angio neck: No evidence of active bleeding around tracheostomy site. No hematoma.  No collection   - Seen by ENT; Car/vero and b/l bronchi visualized without any trauma, small amount of blood tinged secretions resting at beginning of right bronchus not actively bleeding.  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  Was able to phonate with partial cuff deflation thus encouraged to be used when family present ot facilitate communication.  - Vent:  volume /12/40%/+5  - Tolerates intermittent PSV 15/5 during day/Vent HS  - Airway Clearance: Duoneb, Hypersal, Chest PT, PRN suctioning     Tracheal Bleeding (Intermittent)-->resolved since admission   - S/p CT Angio neck: No evidence of active bleeding around tracheostomy site. No hematoma.  No collection   - Seen by ENT; Car/vero and b/l bronchi visualized without any trauma, small amount of blood tinged secretions resting at beginning of right bronchus not actively bleeding.  - 12/13 tracheal bleeding upon suctioning - tranexamic neb given  - 12/18 tranexamic neb x2 doses - improved  - intermittent blood tinged secretions with suctioning likely 2/2 trauma from increased suctioning

## 2024-01-03 NOTE — PROGRESS NOTE ADULT - ASSESSMENT
Nutrition Services Encounter:  Met with patient's grandparents Gregg and Kj as grandparents with tentative plans to begin adoption process. Grandparents are not familiar with CF/nutrition needs for Geraldine. Here today for new family education day.     Interventions:  Reviewed mechanics of nutrition and CF with grandparents. Discussed need for life-long high calorie/protein/fat/salt diet. Reviewed need/function of pancreatic enzymes and mechanics. Stated patient needs to take enzymes with all fat containing foods/fluids just prior to eating or drinking. Reviewed foods that do not require enzymes. Educated grandparents on need for daily salt and fat soluble vitamin supplementation. Reviewed yearly annual studies to screen for fat soluble vitamin deficiencies. Also discussed healthy, high calorie foods that would be good for Geraldine. Written and verbal education materials provided. Grandparents verbalized understanding and did not have further questions at this time. RD contact information provided.     Ellen Babcock RD, TIFFANY, [facility]  Pediatric Cystic Fibrosis & Pulmonary Dietitian  [facility]  Pager #[phone]  Phone #[phone]\     74 y/o F with PMHx of T2DM, HTN, HLD, anemia, hypothyroidism, RA, fibromyalgia, remote cerebral aneurysm repair, acute hypercapnic respiratory failure now with tracheostomy, PEG tube, and bedbound (trach change 8/18, PEG change 8/14), sacral pressure ulcer, BIBEMS from home for 6 day hx of bleeding from the tracheostomy and PEG tube with associated abdominal pain, recent history of auditory and visual hallucinations, and with chronic sacral decubitus ulcer. Exam notable for mild abdominal distention with LLQ and RLQ tenderness, tracheostomy in place with no obvious bleeding, PEG tube with scant amount of dried blood, at baseline neurologic status. Imaging showing no active bleeding around tracheostomy site, however was notable for mild thickening along PEG tube tract, sacral ulcer extending to the coccyx suggestive of possible osteomyelitis, and bibasilar patchy lung opacities with volume loss. Patient admitted for respiratory support, wound care of tracheostomy and PEG, and management of sacral osteomyelitis. No further Trach and peg site bleeding noted since admission.  Pelvic MRI imaging also suggestive of Acute OM. Patient placed on long-term antibiotics with Infectious disease guidance.  Additionally treated with a 7d course of Cefepime due to PSA and Serratia tracheitis on 11/8-->11/15 and then resumed cipro/keflex.  Hospital course c/b Delirium for which Seroquel was added and home Lexapro continued. Tracheostomy changed to #9 Cuffed Portex by ENT 11/3.  Despite trach change patient remained with persistent air leak.  On 11/19, the trach was again changed due to leak and loss of volumes on vent.  Trach was changed to #8 distal cuffed Shiley.  Patient seen by Dermatology for back lesions.  One diagnosed as a seborrheic keratitis and the other a dermatofibroma.  Intermittent abdominal pain throughout hospital course, at times relieved with gas/BM, w/u negative, seen by GI - no recs.  12/10 pt completed cipro and keflex for osteo treatment.  12/13 moderate amounts of secretions w/ blood - tranexamic acid neb given.  notable improvement.  12/18 with blood tinged secretion again - TXA 250mg neb x2 doses.  12/19 spiked fever to 102 - pancultured, negative w/u.    1/3:  Pt remained medically stable throughout the day.  Secretions with less blood tinge.  Speaking Eval performed with RT Raman & SLP Gaye and Miladis    Wound Consult requested to assist w/ management of:  Sacral stage 4 pressure injury    Sacral wound- improving, continue w/Aquacel dressing QD  Trach Mngt as per RCU  PEG Mngt as per GI  BLE elevation  Abx per RCU/ ID  Moisturize intact skin w/ SWEEN cream BID  Nutrition Consult for optimization          encourage high quality protein, ayush/ prosource, MVI & Vit C to promote wound healing  Hyperglycemia -  ADA diet and  FS w/ ISS,  Continue turning and positioning w/ offloading assistive devices as per protocol  Buttock Kanchan BID and prn soiling        Continue w/ attends under pads and Pericare w/ emerson cath maintenance as per protocol  Waffle Cushion to chair when oob to chair  Continue w/ low air loss pressure redistribution bed surface   Pt will need Group 2 mattress on hospital bed and ROHO cushion for wheel chair upon discharge home  Care as per RCU, will follow w/ you  Upon discharge f/u as outpatient at [facility] [address] [facility] [phone]  d/w attng, RN & team   Angela Anthony PA-C CWS [id number]  Nights/ Weekends/ Holidays please call:  General Surgery Consult pager ([phone]) for emergencies  Wound PT for multilayer leg wrapping or VAC issues ([phone])   I spent 35 minutes face to face w/ this pt of which more than 50% of the time was spent counseling & coordinating care of this pt.        72 y/o F with PMHx of T2DM, HTN, HLD, anemia, hypothyroidism, RA, fibromyalgia, remote cerebral aneurysm repair, acute hypercapnic respiratory failure now with tracheostomy, PEG tube, and bedbound (trach change 8/18, PEG change 8/14), sacral pressure ulcer, BIBEMS from home for 6 day hx of bleeding from the tracheostomy and PEG tube with associated abdominal pain, recent history of auditory and visual hallucinations, and with chronic sacral decubitus ulcer. Exam notable for mild abdominal distention with LLQ and RLQ tenderness, tracheostomy in place with no obvious bleeding, PEG tube with scant amount of dried blood, at baseline neurologic status. Imaging showing no active bleeding around tracheostomy site, however was notable for mild thickening along PEG tube tract, sacral ulcer extending to the coccyx suggestive of possible osteomyelitis, and bibasilar patchy lung opacities with volume loss. Patient admitted for respiratory support, wound care of tracheostomy and PEG, and management of sacral osteomyelitis. No further Trach and peg site bleeding noted since admission.  Pelvic MRI imaging also suggestive of Acute OM. Patient placed on long-term antibiotics with Infectious disease guidance.  Additionally treated with a 7d course of Cefepime due to PSA and Serratia tracheitis on 11/8-->11/15 and then resumed cipro/keflex.  Hospital course c/b Delirium for which Seroquel was added and home Lexapro continued. Tracheostomy changed to #9 Cuffed Portex by ENT 11/3.  Despite trach change patient remained with persistent air leak.  On 11/19, the trach was again changed due to leak and loss of volumes on vent.  Trach was changed to #8 distal cuffed Shiley.  Patient seen by Dermatology for back lesions.  One diagnosed as a seborrheic keratitis and the other a dermatofibroma.  Intermittent abdominal pain throughout hospital course, at times relieved with gas/BM, w/u negative, seen by GI - no recs.  12/10 pt completed cipro and keflex for osteo treatment.  12/13 moderate amounts of secretions w/ blood - tranexamic acid neb given.  notable improvement.  12/18 with blood tinged secretion again - TXA 250mg neb x2 doses.  12/19 spiked fever to 102 - pancultured, negative w/u.    1/3:  Pt remained medically stable throughout the day.  Secretions with less blood tinge.  Speaking Eval performed with RT Raman & SLP Gaye and Miladis    Wound Consult requested to assist w/ management of:  Sacral stage 4 pressure injury    Sacral wound- improving, continue w/Aquacel dressing QD  Trach Mngt as per RCU  PEG Mngt as per GI  BLE elevation  Abx per RCU/ ID  Moisturize intact skin w/ SWEEN cream BID  Nutrition Consult for optimization          encourage high quality protein, ayush/ prosource, MVI & Vit C to promote wound healing  Hyperglycemia -  ADA diet and  FS w/ ISS,  Continue turning and positioning w/ offloading assistive devices as per protocol  Buttock Kanchan BID and prn soiling        Continue w/ attends under pads and Pericare w/ emerson cath maintenance as per protocol  Waffle Cushion to chair when oob to chair  Continue w/ low air loss pressure redistribution bed surface   Pt will need Group 2 mattress on hospital bed and ROHO cushion for wheel chair upon discharge home  Care as per RCU, will follow w/ you  Upon discharge f/u as outpatient at [facility] [address] [facility] [phone]  d/w attng, RN & team   Angela Anthony PA-C CWS [id number]  Nights/ Weekends/ Holidays please call:  General Surgery Consult pager ([phone]) for emergencies  Wound PT for multilayer leg wrapping or VAC issues ([phone])   I spent 35 minutes face to face w/ this pt of which more than 50% of the time was spent counseling & coordinating care of this pt.

## 2024-01-04 LAB
GLUCOSE BLDC GLUCOMTR-MCNC: 157 MG/DL — HIGH (ref 70–99)
GLUCOSE BLDC GLUCOMTR-MCNC: 157 MG/DL — HIGH (ref 70–99)
GLUCOSE BLDC GLUCOMTR-MCNC: 166 MG/DL — HIGH (ref 70–99)
GLUCOSE BLDC GLUCOMTR-MCNC: 166 MG/DL — HIGH (ref 70–99)
GLUCOSE BLDC GLUCOMTR-MCNC: 195 MG/DL — HIGH (ref 70–99)
GLUCOSE BLDC GLUCOMTR-MCNC: 195 MG/DL — HIGH (ref 70–99)
GLUCOSE BLDC GLUCOMTR-MCNC: 210 MG/DL — HIGH (ref 70–99)
GLUCOSE BLDC GLUCOMTR-MCNC: 210 MG/DL — HIGH (ref 70–99)

## 2024-01-04 PROCEDURE — 99232 SBSQ HOSP IP/OBS MODERATE 35: CPT

## 2024-01-04 RX ADMIN — Medication 2 DROP(S): at 13:17

## 2024-01-04 RX ADMIN — Medication 5 MILLIGRAM(S): at 05:57

## 2024-01-04 RX ADMIN — INSULIN HUMAN 34 UNIT(S): 100 INJECTION, SOLUTION SUBCUTANEOUS at 05:58

## 2024-01-04 RX ADMIN — Medication 1: at 05:58

## 2024-01-04 RX ADMIN — CHLORHEXIDINE GLUCONATE 15 MILLILITER(S): 213 SOLUTION TOPICAL at 05:57

## 2024-01-04 RX ADMIN — Medication 1: at 14:06

## 2024-01-04 RX ADMIN — INSULIN HUMAN 9 UNIT(S): 100 INJECTION, SOLUTION SUBCUTANEOUS at 18:32

## 2024-01-04 RX ADMIN — SIMETHICONE 80 MILLIGRAM(S): 80 TABLET, CHEWABLE ORAL at 18:32

## 2024-01-04 RX ADMIN — Medication 1 APPLICATION(S): at 13:18

## 2024-01-04 RX ADMIN — SIMETHICONE 80 MILLIGRAM(S): 80 TABLET, CHEWABLE ORAL at 23:58

## 2024-01-04 RX ADMIN — Medication 5 MILLIGRAM(S): at 21:24

## 2024-01-04 RX ADMIN — CHLORHEXIDINE GLUCONATE 1 APPLICATION(S): 213 SOLUTION TOPICAL at 05:57

## 2024-01-04 RX ADMIN — Medication 2 DROP(S): at 23:58

## 2024-01-04 RX ADMIN — ESCITALOPRAM OXALATE 10 MILLIGRAM(S): 10 TABLET, FILM COATED ORAL at 08:03

## 2024-01-04 RX ADMIN — QUETIAPINE FUMARATE 12.5 MILLIGRAM(S): 200 TABLET, FILM COATED ORAL at 18:36

## 2024-01-04 RX ADMIN — PANTOPRAZOLE SODIUM 40 MILLIGRAM(S): 20 TABLET, DELAYED RELEASE ORAL at 08:04

## 2024-01-04 RX ADMIN — AMLODIPINE BESYLATE 10 MILLIGRAM(S): 2.5 TABLET ORAL at 05:56

## 2024-01-04 RX ADMIN — LIDOCAINE 5 MILLILITER(S): 4 CREAM TOPICAL at 18:56

## 2024-01-04 RX ADMIN — SIMETHICONE 80 MILLIGRAM(S): 80 TABLET, CHEWABLE ORAL at 13:16

## 2024-01-04 RX ADMIN — Medication 1 APPLICATION(S): at 00:00

## 2024-01-04 RX ADMIN — INSULIN HUMAN 15 UNIT(S): 100 INJECTION, SOLUTION SUBCUTANEOUS at 11:58

## 2024-01-04 RX ADMIN — Medication 1 TABLET(S): at 18:32

## 2024-01-04 RX ADMIN — LIDOCAINE 1 PATCH: 4 CREAM TOPICAL at 19:51

## 2024-01-04 RX ADMIN — Medication 3 MILLILITER(S): at 11:21

## 2024-01-04 RX ADMIN — SIMETHICONE 80 MILLIGRAM(S): 80 TABLET, CHEWABLE ORAL at 00:02

## 2024-01-04 RX ADMIN — CHLORHEXIDINE GLUCONATE 15 MILLILITER(S): 213 SOLUTION TOPICAL at 18:32

## 2024-01-04 RX ADMIN — GABAPENTIN 100 MILLIGRAM(S): 400 CAPSULE ORAL at 21:23

## 2024-01-04 RX ADMIN — Medication 3 MILLILITER(S): at 05:34

## 2024-01-04 RX ADMIN — Medication 1 APPLICATION(S): at 18:36

## 2024-01-04 RX ADMIN — Medication 125 MICROGRAM(S): at 04:46

## 2024-01-04 RX ADMIN — Medication 1 APPLICATION(S): at 23:59

## 2024-01-04 RX ADMIN — NYSTATIN CREAM 1 APPLICATION(S): 100000 CREAM TOPICAL at 21:24

## 2024-01-04 RX ADMIN — LIDOCAINE 5 MILLILITER(S): 4 CREAM TOPICAL at 05:58

## 2024-01-04 RX ADMIN — Medication 3 MILLILITER(S): at 23:18

## 2024-01-04 RX ADMIN — Medication 2 DROP(S): at 00:00

## 2024-01-04 RX ADMIN — ZINC OXIDE 1 APPLICATION(S): 200 OINTMENT TOPICAL at 13:18

## 2024-01-04 RX ADMIN — SIMETHICONE 80 MILLIGRAM(S): 80 TABLET, CHEWABLE ORAL at 05:56

## 2024-01-04 RX ADMIN — Medication 1 TABLET(S): at 05:56

## 2024-01-04 RX ADMIN — Medication 3 MILLILITER(S): at 17:27

## 2024-01-04 RX ADMIN — NYSTATIN CREAM 1 APPLICATION(S): 100000 CREAM TOPICAL at 05:57

## 2024-01-04 RX ADMIN — Medication 500 MILLIGRAM(S): at 13:15

## 2024-01-04 RX ADMIN — Medication 2: at 18:33

## 2024-01-04 RX ADMIN — Medication 2 DROP(S): at 18:32

## 2024-01-04 RX ADMIN — Medication 1 MILLIGRAM(S): at 21:24

## 2024-01-04 RX ADMIN — PHENYLEPHRINE-SHARK LIVER OIL-MINERAL OIL-PETROLATUM RECTAL OINTMENT 1 APPLICATION(S): at 13:17

## 2024-01-04 RX ADMIN — Medication 2 DROP(S): at 05:57

## 2024-01-04 RX ADMIN — Medication 1 APPLICATION(S): at 05:57

## 2024-01-04 RX ADMIN — LIDOCAINE 1 PATCH: 4 CREAM TOPICAL at 13:16

## 2024-01-04 RX ADMIN — NYSTATIN CREAM 1 APPLICATION(S): 100000 CREAM TOPICAL at 15:45

## 2024-01-04 RX ADMIN — Medication 1: at 23:58

## 2024-01-04 RX ADMIN — Medication 15 MILLILITER(S): at 13:15

## 2024-01-04 RX ADMIN — INSULIN GLARGINE 19 UNIT(S): 100 INJECTION, SOLUTION SUBCUTANEOUS at 08:03

## 2024-01-04 NOTE — PROGRESS NOTE ADULT - PROBLEM SELECTOR PLAN 5
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   - PEG Site appears macerated --> Multifactorial, possible the PEG has been too tight at home  - Peg loosened by GI at beside, no leakage or further intervention required   - recurrent RLQ abdominal pain and bleeding at the PEG site  - reevaluated by GI -- no bleeding at the PEG site  - 12/1: s/p abdominal ultrasound - limited study   - (12/3): Pt is c/o abd pain, and daughter is concerned   - AM amylase and Lipase all wnl , CT A/P 12/3 - no acute findings  - Continue Simethicone    PEG Leak  - 12/20 silver nitrate applied to PEG site by GI

## 2024-01-04 NOTE — PROGRESS NOTE ADULT - ASSESSMENT
74 y/o F with PMHx of T2DM, HTN, HLD, anemia, hypothyroidism, RA, fibromyalgia, remote cerebral aneurysm repair, acute hypercapnic respiratory failure now with tracheostomy, PEG tube, and bedbound (trach change 8/18, PEG change 8/14), sacral pressure ulcer, BIBEMS from home for 6 day hx of bleeding from the tracheostomy and PEG tube with associated abdominal pain, recent history of auditory and visual hallucinations, and with chronic sacral decubitus ulcer. Exam notable for mild abdominal distention with LLQ and RLQ tenderness, tracheostomy in place with no obvious bleeding, PEG tube with scant amount of dried blood, at baseline neurologic status. Imaging showing no active bleeding around tracheostomy site, however was notable for mild thickening along PEG tube tract, sacral ulcer extending to the coccyx suggestive of possible osteomyelitis, and bibasilar patchy lung opacities with volume loss. Patient admitted for respiratory support, wound care of tracheostomy and PEG, and management of sacral osteomyelitis. No further Trach and peg site bleeding noted since admission.  Pelvic MRI imaging also suggestive of Acute OM. Patient placed on long-term antibiotics with Infectious disease guidance.  Additionally treated with a 7d course of Cefepime due to PSA and Serratia tracheitis on 11/8-->11/15 and then resumed cipro/keflex.  Hospital course c/b Delirium for which Seroquel was added and home Lexapro continued. Tracheostomy changed to #9 Cuffed [place] by ENT 11/3.  Despite trach change patient remained with persistent air leak.  On 11/19, the trach was again changed due to leak and loss of volumes on vent.  Trach was changed to #8 distal cuffed Shiley.  Patient seen by Dermatology for back lesions.  One diagnosed as a seborrheic keratitis and the other a dermatofibroma.  Intermittent abdominal pain throughout hospital course, at times relieved with gas/BM, w/u negative, seen by GI - no recs.  12/10 pt completed cipro and keflex for osteo treatment.  12/13 moderate amounts of secretions w/ blood - tranexamic acid neb given with notable improvement.  12/18 with blood tinged secretion again - TXA 250mg neb x2 doses.  12/19 spiked fever to 102 - pancultured, negative w/u.  12/19 silver Nitrate applied to PEG site by GI for leakage.  Pt has since remained medically stable.  Receives continuous pulmonary toileting w/ duoneb, chest PT, and suctioning PRN.      1/3:  Pt remained medically stable throughout the day.  Secretions with less blood tinge.  Speaking Eval performed with RT Raman, SLP Gaye and Miladis, pts daughter Marysol and pts  at the bedside.  Pt with persistent cough with cuff deflation, minimally able to speak without discomfort, with increase in oral secretion production.  Will continue with trach care education with daughter. 72 y/o F with PMHx of T2DM, HTN, HLD, anemia, hypothyroidism, RA, fibromyalgia, remote cerebral aneurysm repair, acute hypercapnic respiratory failure now with tracheostomy, PEG tube, and bedbound (trach change 8/18, PEG change 8/14), sacral pressure ulcer, BIBEMS from home for 6 day hx of bleeding from the tracheostomy and PEG tube with associated abdominal pain, recent history of auditory and visual hallucinations, and with chronic sacral decubitus ulcer. Exam notable for mild abdominal distention with LLQ and RLQ tenderness, tracheostomy in place with no obvious bleeding, PEG tube with scant amount of dried blood, at baseline neurologic status. Imaging showing no active bleeding around tracheostomy site, however was notable for mild thickening along PEG tube tract, sacral ulcer extending to the coccyx suggestive of possible osteomyelitis, and bibasilar patchy lung opacities with volume loss. Patient admitted for respiratory support, wound care of tracheostomy and PEG, and management of sacral osteomyelitis. No further Trach and peg site bleeding noted since admission.  Pelvic MRI imaging also suggestive of Acute OM. Patient placed on long-term antibiotics with Infectious disease guidance.  Additionally treated with a 7d course of Cefepime due to PSA and Serratia tracheitis on 11/8-->11/15 and then resumed cipro/keflex.  Hospital course c/b Delirium for which Seroquel was added and home Lexapro continued. Tracheostomy changed to #9 Cuffed [place] by ENT 11/3.  Despite trach change patient remained with persistent air leak.  On 11/19, the trach was again changed due to leak and loss of volumes on vent.  Trach was changed to #8 distal cuffed Shiley.  Patient seen by Dermatology for back lesions.  One diagnosed as a seborrheic keratitis and the other a dermatofibroma.  Intermittent abdominal pain throughout hospital course, at times relieved with gas/BM, w/u negative, seen by GI - no recs.  12/10 pt completed cipro and keflex for osteo treatment.  12/13 moderate amounts of secretions w/ blood - tranexamic acid neb given with notable improvement.  12/18 with blood tinged secretion again - TXA 250mg neb x2 doses.  12/19 spiked fever to 102 - pancultured, negative w/u.  12/19 silver Nitrate applied to PEG site by GI for leakage.  Pt has since remained medically stable.  Receives continuous pulmonary toileting w/ duoneb, chest PT, and suctioning PRN.      1/3:  Pt remained medically stable throughout the day.  Secretions with less blood tinge.  Speaking Eval performed with RT Raman, SLP Gaye and Miladis, pts daughter Marysol and pts  at the bedside.  Pt with persistent cough with cuff deflation, minimally able to speak without discomfort, with increase in oral secretion production.  Will continue with trach care education with daughter. 74 y/o F with PMHx of T2DM, HTN, HLD, anemia, hypothyroidism, RA, fibromyalgia, remote cerebral aneurysm repair, acute hypercapnic respiratory failure now with tracheostomy, PEG tube, and bedbound (trach change 8/18, PEG change 8/14), sacral pressure ulcer, BIBEMS from home for 6 day hx of bleeding from the tracheostomy and PEG tube with associated abdominal pain, recent history of auditory and visual hallucinations, and with chronic sacral decubitus ulcer. Exam notable for mild abdominal distention with LLQ and RLQ tenderness, tracheostomy in place with no obvious bleeding, PEG tube with scant amount of dried blood, at baseline neurologic status. Imaging showing no active bleeding around tracheostomy site, however was notable for mild thickening along PEG tube tract, sacral ulcer extending to the coccyx suggestive of possible osteomyelitis, and bibasilar patchy lung opacities with volume loss. Patient admitted for respiratory support, wound care of tracheostomy and PEG, and management of sacral osteomyelitis. No further Trach and peg site bleeding noted since admission.  Pelvic MRI imaging also suggestive of Acute OM. Patient placed on long-term antibiotics with Infectious disease guidance.  Additionally treated with a 7d course of Cefepime due to PSA and Serratia tracheitis on 11/8-->11/15 and then resumed cipro/keflex.  Hospital course c/b Delirium for which Seroquel was added and home Lexapro continued. Tracheostomy changed to #9 Cuffed [place] by ENT 11/3.  Despite trach change patient remained with persistent air leak.  On 11/19, the trach was again changed due to leak and loss of volumes on vent.  Trach was changed to #8 distal cuffed Shiley.  Patient seen by Dermatology for back lesions.  One diagnosed as a seborrheic keratitis and the other a dermatofibroma.  Intermittent abdominal pain throughout hospital course, at times relieved with gas/BM, w/u negative, seen by GI - no recs.  12/10 pt completed cipro and keflex for osteo treatment.  12/13 moderate amounts of secretions w/ blood - tranexamic acid neb given with notable improvement.  12/18 with blood tinged secretion again - TXA 250mg neb x2 doses.  12/19 spiked fever to 102 - pancultured, negative w/u.  12/19 silver Nitrate applied to PEG site by GI for leakage.  Pt has since remained medically stable.  Receives continuous pulmonary toileting w/ duoneb, chest PT, and suctioning PRN.      1/4: 72 y/o F with PMHx of T2DM, HTN, HLD, anemia, hypothyroidism, RA, fibromyalgia, remote cerebral aneurysm repair, acute hypercapnic respiratory failure now with tracheostomy, PEG tube, and bedbound (trach change 8/18, PEG change 8/14), sacral pressure ulcer, BIBEMS from home for 6 day hx of bleeding from the tracheostomy and PEG tube with associated abdominal pain, recent history of auditory and visual hallucinations, and with chronic sacral decubitus ulcer. Exam notable for mild abdominal distention with LLQ and RLQ tenderness, tracheostomy in place with no obvious bleeding, PEG tube with scant amount of dried blood, at baseline neurologic status. Imaging showing no active bleeding around tracheostomy site, however was notable for mild thickening along PEG tube tract, sacral ulcer extending to the coccyx suggestive of possible osteomyelitis, and bibasilar patchy lung opacities with volume loss. Patient admitted for respiratory support, wound care of tracheostomy and PEG, and management of sacral osteomyelitis. No further Trach and peg site bleeding noted since admission.  Pelvic MRI imaging also suggestive of Acute OM. Patient placed on long-term antibiotics with Infectious disease guidance.  Additionally treated with a 7d course of Cefepime due to PSA and Serratia tracheitis on 11/8-->11/15 and then resumed cipro/keflex.  Hospital course c/b Delirium for which Seroquel was added and home Lexapro continued. Tracheostomy changed to #9 Cuffed [place] by ENT 11/3.  Despite trach change patient remained with persistent air leak.  On 11/19, the trach was again changed due to leak and loss of volumes on vent.  Trach was changed to #8 distal cuffed Shiley.  Patient seen by Dermatology for back lesions.  One diagnosed as a seborrheic keratitis and the other a dermatofibroma.  Intermittent abdominal pain throughout hospital course, at times relieved with gas/BM, w/u negative, seen by GI - no recs.  12/10 pt completed cipro and keflex for osteo treatment.  12/13 moderate amounts of secretions w/ blood - tranexamic acid neb given with notable improvement.  12/18 with blood tinged secretion again - TXA 250mg neb x2 doses.  12/19 spiked fever to 102 - pancultured, negative w/u.  12/19 silver Nitrate applied to PEG site by GI for leakage.  Pt has since remained medically stable.  Receives continuous pulmonary toileting w/ duoneb, chest PT, and suctioning PRN.      1/4: 74 y/o F with PMHx of T2DM, HTN, HLD, anemia, hypothyroidism, RA, fibromyalgia, remote cerebral aneurysm repair, acute hypercapnic respiratory failure now with tracheostomy, PEG tube, and bedbound (trach change 8/18, PEG change 8/14), sacral pressure ulcer, BIBEMS from home for 6 day hx of bleeding from the tracheostomy and PEG tube with associated abdominal pain, recent history of auditory and visual hallucinations, and with chronic sacral decubitus ulcer. Exam notable for mild abdominal distention with LLQ and RLQ tenderness, tracheostomy in place with no obvious bleeding, PEG tube with scant amount of dried blood, at baseline neurologic status. Imaging showing no active bleeding around tracheostomy site, however was notable for mild thickening along PEG tube tract, sacral ulcer extending to the coccyx suggestive of possible osteomyelitis, and bibasilar patchy lung opacities with volume loss. Patient admitted for respiratory support, wound care of tracheostomy and PEG, and management of sacral osteomyelitis. No further Trach and peg site bleeding noted since admission.  Pelvic MRI imaging also suggestive of Acute OM. Patient placed on long-term antibiotics with Infectious disease guidance.  Additionally treated with a 7d course of Cefepime due to PSA and Serratia tracheitis on 11/8-->11/15 and then resumed cipro/keflex.  Hospital course c/b Delirium for which Seroquel was added and home Lexapro continued. Tracheostomy changed to #9 Cuffed [place] by ENT 11/3.  Despite trach change patient remained with persistent air leak.  On 11/19, the trach was again changed due to leak and loss of volumes on vent.  Trach was changed to #8 distal cuffed Shiley.  Patient seen by Dermatology for back lesions.  One diagnosed as a seborrheic keratitis and the other a dermatofibroma.  Intermittent abdominal pain throughout hospital course, at times relieved with gas/BM, w/u negative, seen by GI - no recs.  12/10 pt completed cipro and keflex for osteo treatment.  12/13 moderate amounts of secretions w/ blood - tranexamic acid neb given with notable improvement.  12/18 with blood tinged secretion again - TXA 250mg neb x2 doses.  12/19 spiked fever to 102 - pancultured, negative w/u.  12/19 silver Nitrate applied to PEG site by GI for leakage.  Pt has since remained medically stable.  Receives continuous pulmonary toileting w/ duoneb, chest PT, and suctioning PRN.      1/4:  Pt remains medically stable.  Afebrile, hemodynamically stable.  Blood tinged secretions have improved and remain intermittent.

## 2024-01-04 NOTE — PROGRESS NOTE ADULT - NS ATTEND AMEND GEN_ALL_CORE FT
73F with a h/o DM, HTN, HLD, anemia, hypothyroidism, RA, fibromyalgia, remote cerebral aneurysm with repair, hypercapnic respiratory failure s/p tracheostomy/PEG, bedbound, sacral pressure ulcer. Admitted w/ bleeding from tracheostomy and PEG w/ associated abdominal pain, recent hx of auditory/visual hallucinations. Imaging showed mild thickening along PEG tube tract and sacral ulcer extending to coccyx suggestive of acute osteomyelitis.      Remains on chronic mech vent, tolerating PS trials during the day for periods of time  Tolerating PEG feeds  Sacral OM s/p 6 week course of Cipro and Keflex as per ID - completed 12/10/23. Appreciate wound care follow up.   c/w current pain regimen - in setting of fibromyalgia and pain from wound  Medically stable for discharge.     Time-based billing (NON-critical care).     38 minutes spent on total encounter. The necessity of the time spent during the encounter on this date of service was due to:     review of the medical record, medical decision making as above, discussion w/ interdisciplinary team.

## 2024-01-04 NOTE — PROGRESS NOTE ADULT - PROBLEM SELECTOR PLAN 4
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  Was able to phonate with partial cuff deflation thus encouraged to be used when family present ot facilitate communication.  - Vent:  volume /12/40%/+5  - Tolerates intermittent PSV 15/5 during day/Vent HS  - Airway Clearance: Duoneb, Hypersal, Chest PT, PRN suctioning     Tracheal Bleeding (Intermittent)-->resolved since admission   - S/p CT Angio neck: No evidence of active bleeding around tracheostomy site. No hematoma.  No collection   - Seen by ENT; Car/vero and b/l bronchi visualized without any trauma, small amount of blood tinged secretions resting at beginning of right bronchus not actively bleeding.  - 12/13 tracheal bleeding upon suctioning - tranexamic neb given  - 12/18 tranexamic neb x2 doses - improved  - intermittent blood tinged secretions with suctioning likely 2/2 trauma from increased suctioning

## 2024-01-04 NOTE — CHART NOTE - NSCHARTNOTEFT_GEN_A_CORE
Age: 73y    Gender: Female    POCT Blood Glucose:  166 mg/dL (01-04-24 @ 05:35)  137 mg/dL (01-03-24 @ 23:51)  195 mg/dL (01-03-24 @ 17:13)  199 mg/dL (01-03-24 @ 11:52)      eMAR:  insulin glargine Injectable (LANTUS)   19 Unit(s) SubCutaneous (01-04-24 @ 08:03)    insulin lispro (ADMELOG) corrective regimen sliding scale   1 Unit(s) SubCutaneous (01-04-24 @ 05:58)   1 Unit(s) SubCutaneous (01-03-24 @ 17:56)   1 Unit(s) SubCutaneous (01-03-24 @ 12:12)    insulin regular  human recombinant   9 Unit(s) SubCutaneous (01-03-24 @ 17:56)    insulin regular  human recombinant   15 Unit(s) SubCutaneous (01-03-24 @ 12:12)    insulin regular  human recombinant   34 Unit(s) SubCutaneous (01-04-24 @ 05:58)    levothyroxine   125 MICROGram(s) Oral (01-04-24 @ 04:46)    predniSONE   Tablet   5 milliGRAM(s) Oral (01-04-24 @ 05:57)    74 y/o F w/h/o T2DM with unknown control due to anemia of chronic disease skewing A1C levels. On Levemir and Humalog insulin PTA. Unknown DM complications. Also h/o HTN, HLD, anemia, hypothyroidism, RA, fibromyalgia, remote cerebral aneurysm repair, acute hypercapnic respiratory failure now with tracheostomy, PEG tube, and bedbound (trach change 8/18, PEG change 8/14), sacral pressure ulcer, BIBEMS from home for 6 day hx of bleeding from the tracheostomy and PEG tube with associated abdominal pain> now resolved. Endocrinology consulted for hyperglycemia. BG Goal 100-180mg/dl   Tolerating tube feeding at goal ( 60 ml, 6am- 2 am ),        Type 2 diabetes mellitus with hyperglycemia, with long-term current use of insulin.   ·  Plan:   - FS BG monitoring [id number] while on TFs/NPO   - FBG at goal continue with  lantus 19 units sc qAM   -c/w   Regular insulin 34units sc at 6am, continue with 15 units sc at 12 noon and 9 units sc at 6pm. PLEASE DO NOT HOLD IF PATIENT IS ON TUBE FEEDS (hold only if TF are stopped). Can decrease/adjust dose if there is a concern for hypoglycemia.   - 1800 BG bordeline/above goal x2, however previously at goal, if 1800 BG remains above goal today consider increasing noon Regular insulin to 17 units starting tomorrow,   - C/w low dose admelog correction scale every 6 hours  - please inform endocrine team if there are any changes in tube feeding( formula or rate)    discussed with Isaura MARCOS Age: 73y    Gender: Female    POCT Blood Glucose:  166 mg/dL (01-04-24 @ 05:35)  137 mg/dL (01-03-24 @ 23:51)  195 mg/dL (01-03-24 @ 17:13)  199 mg/dL (01-03-24 @ 11:52)      eMAR:  insulin glargine Injectable (LANTUS)   19 Unit(s) SubCutaneous (01-04-24 @ 08:03)    insulin lispro (ADMELOG) corrective regimen sliding scale   1 Unit(s) SubCutaneous (01-04-24 @ 05:58)   1 Unit(s) SubCutaneous (01-03-24 @ 17:56)   1 Unit(s) SubCutaneous (01-03-24 @ 12:12)    insulin regular  human recombinant   9 Unit(s) SubCutaneous (01-03-24 @ 17:56)    insulin regular  human recombinant   15 Unit(s) SubCutaneous (01-03-24 @ 12:12)    insulin regular  human recombinant   34 Unit(s) SubCutaneous (01-04-24 @ 05:58)    levothyroxine   125 MICROGram(s) Oral (01-04-24 @ 04:46)    predniSONE   Tablet   5 milliGRAM(s) Oral (01-04-24 @ 05:57)    74 y/o F w/h/o T2DM with unknown control due to anemia of chronic disease skewing A1C levels. On Levemir and Humalog insulin PTA. Unknown DM complications. Also h/o HTN, HLD, anemia, hypothyroidism, RA, fibromyalgia, remote cerebral aneurysm repair, acute hypercapnic respiratory failure now with tracheostomy, PEG tube, and bedbound (trach change 8/18, PEG change 8/14), sacral pressure ulcer, BIBEMS from home for 6 day hx of bleeding from the tracheostomy and PEG tube with associated abdominal pain> now resolved. Endocrinology consulted for hyperglycemia. BG Goal 100-180mg/dl   Tolerating tube feeding at goal ( 60 ml, 6am- 2 am ),        Type 2 diabetes mellitus with hyperglycemia, with long-term current use of insulin.   ·  Plan:   - FS BG monitoring [id number] while on TFs/NPO   - FBG at goal continue with  lantus 19 units sc qAM   -c/w   Regular insulin 34units sc at 6am, continue with 15 units sc at 12 noon and 9 units sc at 6pm. PLEASE DO NOT HOLD IF PATIENT IS ON TUBE FEEDS (hold only if TF are stopped). Can decrease/adjust dose if there is a concern for hypoglycemia.   - 1800 BG bordeline/above goal x2, however previously at goal, if 1800 BG remains above goal today consider increasing noon Regular insulin to 17 units starting tomorrow,   - C/w low dose admelog correction scale every 6 hours  - please inform endocrine team if there are any changes in tube feeding( formula or rate)    discussed with Isaura MARCOS

## 2024-01-04 NOTE — PROGRESS NOTE ADULT - PROBLEM SELECTOR PLAN 2
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   - S/p CT abd/pelvis (10/28): Sacral decubitus ulcer extending to the coccyx with osseous remodeling and pelvic MRI or bone scan to recc to exclude osteomyelitis.  - 10/30 wound care note: Sacral stage 4 pressure injury 4.5cm x 2.5cm x 1.5cm w/ lip of undermining circumferentially greatest 9-12 of 3cm.  - MRI pelvis 10/30 with Osteomyelitis, completed Cefepime for 7 days, Vancomycin d/marli   - 11/10: resumed Cipro 500mg q 12 and Keflex 500mg q 6 until 12/10.  - 11/20: Changed to IV Cipro 400 q12 as recommended by ID pharmacist due to multiple drug interactions when given via PEG  - 11/28 wound care note: Sacral stage 4pressure injury 4.5cm x 2.5cm x 0.5cm, moist granular wound bed   palpable but not exposed bone no blistering, (+) serosanguinous drainage. No odor, erythema, increased warmth, tenderness, induration, fluctuance, nor crepitus.  - 12/3 wound culture ordered - d/c'd as no need for culture, wound improving, no signs of infection  - 12/10 completed cipro and keflex x5 week course  - wound improving   - wound care following

## 2024-01-04 NOTE — PROGRESS NOTE ADULT - SUBJECTIVE AND OBJECTIVE BOX
Patient is a 73y old  Female who presents with a chief complaint of PEG Bleeding (22 Dec 2023 07:35)      Interval Events:    REVIEW OF SYSTEMS:  [ ] Positive  [ ] All other systems negative  [ ] Unable to assess ROS because ________    Vital Signs Last 24 Hrs  T(C): 36.8 (01-04-24 @ 05:31), Max: 36.9 (01-03-24 @ 17:55)  T(F): 98.2 (01-04-24 @ 05:31), Max: 98.4 (01-03-24 @ 17:55)  HR: 95 (01-04-24 @ 05:34) (86 - 101)  BP: 136/59 (01-04-24 @ 05:31) (112/52 - 136/59)  RR: 17 (01-04-24 @ 05:31) (17 - 20)  SpO2: 100% (01-04-24 @ 05:34) (98% - 100%)    PHYSICAL EXAM:  HEENT:   [ ]Tracheostomy:  [ ]Pupils equal  [ ]No oral lesions  [ ]Abnormal    SKIN  [ ]No Rash  [ ] Abnormal  [ ] pressure    CARDIAC  [ ]Regular  [ ]Abnormal    PULMONARY  [ ]Bilateral Clear Breath Sounds  [ ]Normal Excursion  [ ]Abnormal    GI  [ ]PEG      [ ] +BS		              [ ]Soft, nondistended, nontender	  [ ]Abnormal    MUSCULOSKELETAL                                   [ ]Bedbound                 [ ]Abnormal    [ ]Ambulatory/OOB to chair                           EXTREMITIES                                         [ ]Normal  [ ]Edema                           NEUROLOGIC  [ ] Normal, non focal  [ ] Focal findings:    PSYCHIATRIC  [ ]Alert and appropriate  [ ] Sedated	 [ ]Agitated    :  Mcbride: [ ] Yes, if yes: Date of Placement:                   [  ] No    LINES: Central Lines [ ] Yes, if yes: Date of Placement                                     [  ] No    HOSPITAL MEDICATIONS:  MEDICATIONS  (STANDING):  albuterol/ipratropium for Nebulization 3 milliLiter(s) Nebulizer every 6 hours  amLODIPine   Tablet 10 milliGRAM(s) Oral daily  artificial  tears Solution 2 Drop(s) Both EYES four times a day  ascorbic acid 500 milliGRAM(s) Oral daily  calcium carbonate    500 mG (Tums) Chewable 1 Tablet(s) Chew every 12 hours  chlorhexidine 0.12% Liquid 15 milliLiter(s) Oral Mucosa every 12 hours  chlorhexidine 4% Liquid 1 Application(s) Topical <User Schedule>  dextrose 50% Injectable 12.5 Gram(s) IV Push once  dextrose 50% Injectable 25 Gram(s) IV Push once  escitalopram 10 milliGRAM(s) Oral <User Schedule>  gabapentin Solution 100 milliGRAM(s) Enteral Tube at bedtime  glucagon  Injectable 1 milliGRAM(s) IntraMuscular once  hemorrhoidal Ointment      hemorrhoidal Ointment 1 Application(s) Rectal daily  insulin glargine Injectable (LANTUS) 19 Unit(s) SubCutaneous every morning  insulin lispro (ADMELOG) corrective regimen sliding scale   SubCutaneous every 6 hours  insulin regular  human recombinant 9 Unit(s) SubCutaneous <User Schedule>  insulin regular  human recombinant 15 Unit(s) SubCutaneous <User Schedule>  insulin regular  human recombinant 34 Unit(s) SubCutaneous <User Schedule>  levothyroxine 125 MICROGram(s) Oral <User Schedule>  lidocaine   4% Patch 1 Patch Transdermal daily  lidocaine   4% Patch 1 Patch Transdermal daily  lidocaine 2% Viscous 5 milliLiter(s) Mucosal two times a day  melatonin 1 milliGRAM(s) Oral at bedtime  melatonin 5 milliGRAM(s) Oral at bedtime  multivitamin/minerals/iron Oral Solution (CENTRUM) 15 milliLiter(s) Oral daily  nystatin Powder 1 Application(s) Topical every 8 hours  pantoprazole   Suspension 40 milliGRAM(s) Enteral Tube <User Schedule>  petrolatum Ophthalmic Ointment 1 Application(s) Both EYES four times a day  predniSONE   Tablet 5 milliGRAM(s) Oral daily  QUEtiapine 12.5 milliGRAM(s) Oral <User Schedule>  silver nitrate Applicator 1 Application(s) Topical once  simethicone 80 milliGRAM(s) Chew four times a day  zinc oxide 40% Paste 1 Application(s) Topical daily    MEDICATIONS  (PRN):  acetaminophen   Oral Liquid .. 1000 milliGRAM(s) Enteral Tube every 6 hours PRN Temp greater or equal to 38C (100.4F), Mild Pain (1 - 3)  benzocaine 20% Spray 1 Spray(s) Topical four times a day PRN Cough  diclofenac sodium 1% Gel 2 Gram(s) Topical four times a day PRN pain  diphenhydrAMINE Elixir 25 milliGRAM(s) Oral every 6 hours PRN Rash and/or Itching  guaifenesin/dextromethorphan Oral Liquid 10 milliLiter(s) Oral every 6 hours PRN Cough  sodium chloride 0.65% Nasal 1 Spray(s) Both Nostrils every 6 hours PRN Nasal Congestion      LABS:                  CAPILLARY BLOOD GLUCOSE    MICROBIOLOGY:     RADIOLOGY:  [ ] Reviewed and interpreted by me    Mode: AC/ CMV (Assist Control/ Continuous Mandatory Ventilation)  RR (machine): 12  TV (machine): 300  FiO2: 30  PEEP: 5  ITime: 1  MAP: 9  PIP: 26   Patient is a 73y old  Female who presents with a chief complaint of PEG Bleeding (22 Dec 2023 07:35)      Interval Events:  No acute events overnight.     REVIEW OF SYSTEMS:  [ ] Positive  [X] All other systems negative  [ ] Unable to assess ROS because ________    Vital Signs Last 24 Hrs  T(C): 36.8 (01-04-24 @ 05:31), Max: 36.9 (01-03-24 @ 17:55)  T(F): 98.2 (01-04-24 @ 05:31), Max: 98.4 (01-03-24 @ 17:55)  HR: 95 (01-04-24 @ 05:34) (86 - 101)  BP: 136/59 (01-04-24 @ 05:31) (112/52 - 136/59)  RR: 17 (01-04-24 @ 05:31) (17 - 20)  SpO2: 100% (01-04-24 @ 05:34) (98% - 100%)    PHYSICAL EXAM:  HEENT:   [X]Tracheostomy: #8 distal XLT Cuffed Shiley  [X]Pupils equal  [ ]No oral lesions  [ ]Abnormal    SKIN  [X]No Rash  [ ] Abnormal  [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    CARDIAC  [X]Regular  [ ]Abnormal    PULMONARY  [X]Bilateral Clear Breath Sounds  [ ]Normal Excursion  [ ]Abnormal    GI  [X]PEG site c/d/i      [X] +BS		              [X]Soft, nondistended, nontender	  [ ]Abnormal    MUSCULOSKELETAL                                   [X]Bedbound                 [ ]Abnormal    [ ]Ambulatory/OOB to chair                           EXTREMITIES                                         [X]Normal  [ ]Edema                           NEUROLOGIC  [ ] Normal, non focal  [X] Focal findings: opens eyes spontaneously, follows commands on all 4 extremities    PSYCHIATRIC  [X]Alert and appropriate  [ ] Sedated	 [ ]Agitated    :  Mcbride: [ ] Yes, if yes: Date of Placement:                   [X] No    LINES: Central Lines [ ] Yes, if yes: Date of Placement                                     [X] No    HOSPITAL MEDICATIONS:  MEDICATIONS  (STANDING):  albuterol/ipratropium for Nebulization 3 milliLiter(s) Nebulizer every 6 hours  amLODIPine   Tablet 10 milliGRAM(s) Oral daily  artificial  tears Solution 2 Drop(s) Both EYES four times a day  ascorbic acid 500 milliGRAM(s) Oral daily  calcium carbonate    500 mG (Tums) Chewable 1 Tablet(s) Chew every 12 hours  chlorhexidine 0.12% Liquid 15 milliLiter(s) Oral Mucosa every 12 hours  chlorhexidine 4% Liquid 1 Application(s) Topical <User Schedule>  dextrose 50% Injectable 12.5 Gram(s) IV Push once  dextrose 50% Injectable 25 Gram(s) IV Push once  escitalopram 10 milliGRAM(s) Oral <User Schedule>  gabapentin Solution 100 milliGRAM(s) Enteral Tube at bedtime  glucagon  Injectable 1 milliGRAM(s) IntraMuscular once  hemorrhoidal Ointment      hemorrhoidal Ointment 1 Application(s) Rectal daily  insulin glargine Injectable (LANTUS) 19 Unit(s) SubCutaneous every morning  insulin lispro (ADMELOG) corrective regimen sliding scale   SubCutaneous every 6 hours  insulin regular  human recombinant 9 Unit(s) SubCutaneous <User Schedule>  insulin regular  human recombinant 15 Unit(s) SubCutaneous <User Schedule>  insulin regular  human recombinant 34 Unit(s) SubCutaneous <User Schedule>  levothyroxine 125 MICROGram(s) Oral <User Schedule>  lidocaine   4% Patch 1 Patch Transdermal daily  lidocaine   4% Patch 1 Patch Transdermal daily  lidocaine 2% Viscous 5 milliLiter(s) Mucosal two times a day  melatonin 1 milliGRAM(s) Oral at bedtime  melatonin 5 milliGRAM(s) Oral at bedtime  multivitamin/minerals/iron Oral Solution (CENTRUM) 15 milliLiter(s) Oral daily  nystatin Powder 1 Application(s) Topical every 8 hours  pantoprazole   Suspension 40 milliGRAM(s) Enteral Tube <User Schedule>  petrolatum Ophthalmic Ointment 1 Application(s) Both EYES four times a day  predniSONE   Tablet 5 milliGRAM(s) Oral daily  QUEtiapine 12.5 milliGRAM(s) Oral <User Schedule>  silver nitrate Applicator 1 Application(s) Topical once  simethicone 80 milliGRAM(s) Chew four times a day  zinc oxide 40% Paste 1 Application(s) Topical daily    MEDICATIONS  (PRN):  acetaminophen   Oral Liquid .. 1000 milliGRAM(s) Enteral Tube every 6 hours PRN Temp greater or equal to 38C (100.4F), Mild Pain (1 - 3)  benzocaine 20% Spray 1 Spray(s) Topical four times a day PRN Cough  diclofenac sodium 1% Gel 2 Gram(s) Topical four times a day PRN pain  diphenhydrAMINE Elixir 25 milliGRAM(s) Oral every 6 hours PRN Rash and/or Itching  guaifenesin/dextromethorphan Oral Liquid 10 milliLiter(s) Oral every 6 hours PRN Cough  sodium chloride 0.65% Nasal 1 Spray(s) Both Nostrils every 6 hours PRN Nasal Congestion      LABS:                  CAPILLARY BLOOD GLUCOSE    MICROBIOLOGY:     RADIOLOGY:  [ ] Reviewed and interpreted by me    Mode: AC/ CMV (Assist Control/ Continuous Mandatory Ventilation)  RR (machine): 12  TV (machine): 300  FiO2: 30  PEEP: 5  ITime: 1  MAP: 9  PIP: 26

## 2024-01-05 LAB
ANION GAP SERPL CALC-SCNC: 10 MMOL/L — SIGNIFICANT CHANGE UP (ref 5–17)
ANION GAP SERPL CALC-SCNC: 10 MMOL/L — SIGNIFICANT CHANGE UP (ref 5–17)
BUN SERPL-MCNC: 19 MG/DL — SIGNIFICANT CHANGE UP (ref 7–23)
BUN SERPL-MCNC: 19 MG/DL — SIGNIFICANT CHANGE UP (ref 7–23)
CALCIUM SERPL-MCNC: 9.9 MG/DL — SIGNIFICANT CHANGE UP (ref 8.4–10.5)
CALCIUM SERPL-MCNC: 9.9 MG/DL — SIGNIFICANT CHANGE UP (ref 8.4–10.5)
CHLORIDE SERPL-SCNC: 97 MMOL/L — SIGNIFICANT CHANGE UP (ref 96–108)
CHLORIDE SERPL-SCNC: 97 MMOL/L — SIGNIFICANT CHANGE UP (ref 96–108)
CO2 SERPL-SCNC: 29 MMOL/L — SIGNIFICANT CHANGE UP (ref 22–31)
CO2 SERPL-SCNC: 29 MMOL/L — SIGNIFICANT CHANGE UP (ref 22–31)
CREAT SERPL-MCNC: <0.3 MG/DL — LOW (ref 0.5–1.3)
CREAT SERPL-MCNC: <0.3 MG/DL — LOW (ref 0.5–1.3)
EGFR: 113 ML/MIN/1.73M2 — SIGNIFICANT CHANGE UP
EGFR: 113 ML/MIN/1.73M2 — SIGNIFICANT CHANGE UP
GLUCOSE BLDC GLUCOMTR-MCNC: 110 MG/DL — HIGH (ref 70–99)
GLUCOSE BLDC GLUCOMTR-MCNC: 110 MG/DL — HIGH (ref 70–99)
GLUCOSE BLDC GLUCOMTR-MCNC: 151 MG/DL — HIGH (ref 70–99)
GLUCOSE BLDC GLUCOMTR-MCNC: 151 MG/DL — HIGH (ref 70–99)
GLUCOSE BLDC GLUCOMTR-MCNC: 159 MG/DL — HIGH (ref 70–99)
GLUCOSE BLDC GLUCOMTR-MCNC: 159 MG/DL — HIGH (ref 70–99)
GLUCOSE BLDC GLUCOMTR-MCNC: 177 MG/DL — HIGH (ref 70–99)
GLUCOSE BLDC GLUCOMTR-MCNC: 177 MG/DL — HIGH (ref 70–99)
GLUCOSE SERPL-MCNC: 138 MG/DL — HIGH (ref 70–99)
GLUCOSE SERPL-MCNC: 138 MG/DL — HIGH (ref 70–99)
HCT VFR BLD CALC: 27.8 % — LOW (ref 34.5–45)
HCT VFR BLD CALC: 27.8 % — LOW (ref 34.5–45)
HGB BLD-MCNC: 8.4 G/DL — LOW (ref 11.5–15.5)
HGB BLD-MCNC: 8.4 G/DL — LOW (ref 11.5–15.5)
MAGNESIUM SERPL-MCNC: 1.7 MG/DL — SIGNIFICANT CHANGE UP (ref 1.6–2.6)
MAGNESIUM SERPL-MCNC: 1.7 MG/DL — SIGNIFICANT CHANGE UP (ref 1.6–2.6)
MCHC RBC-ENTMCNC: 26.8 PG — LOW (ref 27–34)
MCHC RBC-ENTMCNC: 26.8 PG — LOW (ref 27–34)
MCHC RBC-ENTMCNC: 30.2 GM/DL — LOW (ref 32–36)
MCHC RBC-ENTMCNC: 30.2 GM/DL — LOW (ref 32–36)
MCV RBC AUTO: 88.8 FL — SIGNIFICANT CHANGE UP (ref 80–100)
MCV RBC AUTO: 88.8 FL — SIGNIFICANT CHANGE UP (ref 80–100)
NRBC # BLD: 0 /100 WBCS — SIGNIFICANT CHANGE UP (ref 0–0)
NRBC # BLD: 0 /100 WBCS — SIGNIFICANT CHANGE UP (ref 0–0)
PLATELET # BLD AUTO: 141 K/UL — LOW (ref 150–400)
PLATELET # BLD AUTO: 141 K/UL — LOW (ref 150–400)
POTASSIUM SERPL-MCNC: 3.8 MMOL/L — SIGNIFICANT CHANGE UP (ref 3.5–5.3)
POTASSIUM SERPL-MCNC: 3.8 MMOL/L — SIGNIFICANT CHANGE UP (ref 3.5–5.3)
POTASSIUM SERPL-SCNC: 3.8 MMOL/L — SIGNIFICANT CHANGE UP (ref 3.5–5.3)
POTASSIUM SERPL-SCNC: 3.8 MMOL/L — SIGNIFICANT CHANGE UP (ref 3.5–5.3)
RBC # BLD: 3.13 M/UL — LOW (ref 3.8–5.2)
RBC # BLD: 3.13 M/UL — LOW (ref 3.8–5.2)
RBC # FLD: 17.6 % — HIGH (ref 10.3–14.5)
RBC # FLD: 17.6 % — HIGH (ref 10.3–14.5)
SODIUM SERPL-SCNC: 136 MMOL/L — SIGNIFICANT CHANGE UP (ref 135–145)
SODIUM SERPL-SCNC: 136 MMOL/L — SIGNIFICANT CHANGE UP (ref 135–145)
WBC # BLD: 6.8 K/UL — SIGNIFICANT CHANGE UP (ref 3.8–10.5)
WBC # BLD: 6.8 K/UL — SIGNIFICANT CHANGE UP (ref 3.8–10.5)
WBC # FLD AUTO: 6.8 K/UL — SIGNIFICANT CHANGE UP (ref 3.8–10.5)
WBC # FLD AUTO: 6.8 K/UL — SIGNIFICANT CHANGE UP (ref 3.8–10.5)

## 2024-01-05 PROCEDURE — 99232 SBSQ HOSP IP/OBS MODERATE 35: CPT

## 2024-01-05 RX ORDER — MAGNESIUM SULFATE 500 MG/ML
2 VIAL (ML) INJECTION ONCE
Refills: 0 | Status: COMPLETED | OUTPATIENT
Start: 2024-01-05 | End: 2024-01-05

## 2024-01-05 RX ORDER — INSULIN HUMAN 100 [IU]/ML
17 INJECTION, SOLUTION SUBCUTANEOUS
Refills: 0 | Status: DISCONTINUED | OUTPATIENT
Start: 2024-01-05 | End: 2024-01-05

## 2024-01-05 RX ORDER — POTASSIUM CHLORIDE 20 MEQ
20 PACKET (EA) ORAL ONCE
Refills: 0 | Status: COMPLETED | OUTPATIENT
Start: 2024-01-05 | End: 2024-01-05

## 2024-01-05 RX ORDER — INSULIN HUMAN 100 [IU]/ML
16 INJECTION, SOLUTION SUBCUTANEOUS
Refills: 0 | Status: DISCONTINUED | OUTPATIENT
Start: 2024-01-06 | End: 2024-01-07

## 2024-01-05 RX ADMIN — LIDOCAINE 1 PATCH: 4 CREAM TOPICAL at 01:23

## 2024-01-05 RX ADMIN — INSULIN HUMAN 9 UNIT(S): 100 INJECTION, SOLUTION SUBCUTANEOUS at 18:16

## 2024-01-05 RX ADMIN — GABAPENTIN 100 MILLIGRAM(S): 400 CAPSULE ORAL at 21:25

## 2024-01-05 RX ADMIN — Medication 1: at 06:16

## 2024-01-05 RX ADMIN — SIMETHICONE 80 MILLIGRAM(S): 80 TABLET, CHEWABLE ORAL at 12:09

## 2024-01-05 RX ADMIN — Medication 1 TABLET(S): at 05:18

## 2024-01-05 RX ADMIN — Medication 1 TABLET(S): at 18:14

## 2024-01-05 RX ADMIN — CHLORHEXIDINE GLUCONATE 15 MILLILITER(S): 213 SOLUTION TOPICAL at 05:18

## 2024-01-05 RX ADMIN — Medication 500 MILLIGRAM(S): at 12:12

## 2024-01-05 RX ADMIN — LIDOCAINE 5 MILLILITER(S): 4 CREAM TOPICAL at 18:14

## 2024-01-05 RX ADMIN — Medication 5 MILLIGRAM(S): at 21:26

## 2024-01-05 RX ADMIN — INSULIN HUMAN 34 UNIT(S): 100 INJECTION, SOLUTION SUBCUTANEOUS at 06:17

## 2024-01-05 RX ADMIN — LIDOCAINE 5 MILLILITER(S): 4 CREAM TOPICAL at 05:22

## 2024-01-05 RX ADMIN — LIDOCAINE 1 PATCH: 4 CREAM TOPICAL at 19:34

## 2024-01-05 RX ADMIN — SIMETHICONE 80 MILLIGRAM(S): 80 TABLET, CHEWABLE ORAL at 05:22

## 2024-01-05 RX ADMIN — Medication 1 APPLICATION(S): at 18:16

## 2024-01-05 RX ADMIN — CHLORHEXIDINE GLUCONATE 1 APPLICATION(S): 213 SOLUTION TOPICAL at 05:19

## 2024-01-05 RX ADMIN — INSULIN GLARGINE 19 UNIT(S): 100 INJECTION, SOLUTION SUBCUTANEOUS at 08:26

## 2024-01-05 RX ADMIN — NYSTATIN CREAM 1 APPLICATION(S): 100000 CREAM TOPICAL at 14:17

## 2024-01-05 RX ADMIN — PHENYLEPHRINE-SHARK LIVER OIL-MINERAL OIL-PETROLATUM RECTAL OINTMENT 1 APPLICATION(S): at 12:12

## 2024-01-05 RX ADMIN — Medication 20 MILLIEQUIVALENT(S): at 18:23

## 2024-01-05 RX ADMIN — Medication 3 MILLILITER(S): at 05:47

## 2024-01-05 RX ADMIN — NYSTATIN CREAM 1 APPLICATION(S): 100000 CREAM TOPICAL at 21:26

## 2024-01-05 RX ADMIN — QUETIAPINE FUMARATE 12.5 MILLIGRAM(S): 200 TABLET, FILM COATED ORAL at 18:15

## 2024-01-05 RX ADMIN — NYSTATIN CREAM 1 APPLICATION(S): 100000 CREAM TOPICAL at 05:22

## 2024-01-05 RX ADMIN — ZINC OXIDE 1 APPLICATION(S): 200 OINTMENT TOPICAL at 12:11

## 2024-01-05 RX ADMIN — ESCITALOPRAM OXALATE 10 MILLIGRAM(S): 10 TABLET, FILM COATED ORAL at 08:26

## 2024-01-05 RX ADMIN — AMLODIPINE BESYLATE 10 MILLIGRAM(S): 2.5 TABLET ORAL at 05:17

## 2024-01-05 RX ADMIN — CHLORHEXIDINE GLUCONATE 15 MILLILITER(S): 213 SOLUTION TOPICAL at 18:16

## 2024-01-05 RX ADMIN — Medication 2 DROP(S): at 12:12

## 2024-01-05 RX ADMIN — Medication 125 MICROGRAM(S): at 05:22

## 2024-01-05 RX ADMIN — Medication 1 APPLICATION(S): at 12:12

## 2024-01-05 RX ADMIN — Medication 3 MILLILITER(S): at 11:54

## 2024-01-05 RX ADMIN — LIDOCAINE 1 PATCH: 4 CREAM TOPICAL at 12:10

## 2024-01-05 RX ADMIN — Medication 15 MILLILITER(S): at 12:12

## 2024-01-05 RX ADMIN — Medication 5 MILLIGRAM(S): at 05:18

## 2024-01-05 RX ADMIN — INSULIN HUMAN 17 UNIT(S): 100 INJECTION, SOLUTION SUBCUTANEOUS at 12:13

## 2024-01-05 RX ADMIN — Medication 2 DROP(S): at 05:18

## 2024-01-05 RX ADMIN — Medication 1 MILLIGRAM(S): at 21:26

## 2024-01-05 RX ADMIN — Medication 1 APPLICATION(S): at 05:18

## 2024-01-05 RX ADMIN — Medication 25 GRAM(S): at 18:23

## 2024-01-05 RX ADMIN — Medication 3 MILLILITER(S): at 23:19

## 2024-01-05 RX ADMIN — Medication 1: at 12:13

## 2024-01-05 RX ADMIN — SIMETHICONE 80 MILLIGRAM(S): 80 TABLET, CHEWABLE ORAL at 18:14

## 2024-01-05 RX ADMIN — Medication 3 MILLILITER(S): at 17:36

## 2024-01-05 RX ADMIN — PANTOPRAZOLE SODIUM 40 MILLIGRAM(S): 20 TABLET, DELAYED RELEASE ORAL at 08:26

## 2024-01-05 RX ADMIN — Medication 2 DROP(S): at 18:15

## 2024-01-05 NOTE — PROGRESS NOTE ADULT - PROBLEM SELECTOR PLAN 9
Continue Home Lexapro 10mg daily   Psych consult appreciated   - Continue Seroquel 12.5mg daily (6PM) Continue Home Lexapro 10mg daily   Psych consult appreciated   - Continue Seroquel 12.5mg daily (6PM)  - pt is calm and cooperative

## 2024-01-05 NOTE — PROGRESS NOTE ADULT - NS ATTEND AMEND GEN_ALL_CORE FT
73F with a h/o DM, HTN, HLD, anemia, hypothyroidism, RA, fibromyalgia, remote cerebral aneurysm with repair, hypercapnic respiratory failure s/p tracheostomy/PEG, bedbound, sacral pressure ulcer. Admitted w/ bleeding from tracheostomy and PEG w/ associated abdominal pain, recent hx of auditory/visual hallucinations. Imaging showed mild thickening along PEG tube tract and sacral ulcer extending to coccyx suggestive of acute osteomyelitis.      Remains on chronic mech vent, tolerating PS trials during the day for periods of time but unable to fully wean and unfortunately she will continue to be ventilator dependent  Tolerating PEG feeds  Sacral OM s/p 6 week course of Cipro and Keflex as per ID - completed 12/10/23. Appreciate wound care follow up.   c/w current pain regimen - in setting of fibromyalgia and pain from wound  Medically stable for discharge- plan is for Wed 1/10 d/c home  above was discussed with the patient's daughter at bedside, at length    Time-based billing (NON-critical care).     38 minutes spent on total encounter. The necessity of the time spent during the encounter on this date of service was due to:     review of the medical record, medical decision making as above, discussion w/ interdisciplinary team.

## 2024-01-05 NOTE — PROGRESS NOTE ADULT - SUBJECTIVE AND OBJECTIVE BOX
Patient is a 73y old  Female who presents with a chief complaint of PEG Bleeding (22 Dec 2023 07:35)      Interval Events:    REVIEW OF SYSTEMS:  [ ] Positive  [ ] All other systems negative  [ ] Unable to assess ROS because ________    Vital Signs Last 24 Hrs  T(C): 36.5 (01-05-24 @ 04:20), Max: 37 (01-04-24 @ 18:09)  T(F): 97.7 (01-05-24 @ 04:20), Max: 98.6 (01-04-24 @ 18:09)  HR: 85 (01-05-24 @ 05:47) (79 - 97)  BP: 130/60 (01-05-24 @ 04:20) (107/53 - 130/60)  RR: 18 (01-05-24 @ 04:20) (18 - 19)  SpO2: 100% (01-05-24 @ 05:47) (99% - 100%)    PHYSICAL EXAM:  HEENT:   [ ]Tracheostomy:  [ ]Pupils equal  [ ]No oral lesions  [ ]Abnormal    SKIN  [ ]No Rash  [ ] Abnormal  [ ] pressure    CARDIAC  [ ]Regular  [ ]Abnormal    PULMONARY  [ ]Bilateral Clear Breath Sounds  [ ]Normal Excursion  [ ]Abnormal    GI  [ ]PEG      [ ] +BS		              [ ]Soft, nondistended, nontender	  [ ]Abnormal    MUSCULOSKELETAL                                   [ ]Bedbound                 [ ]Abnormal    [ ]Ambulatory/OOB to chair                           EXTREMITIES                                         [ ]Normal  [ ]Edema                           NEUROLOGIC  [ ] Normal, non focal  [ ] Focal findings:    PSYCHIATRIC  [ ]Alert and appropriate  [ ] Sedated	 [ ]Agitated    :  Mcbride: [ ] Yes, if yes: Date of Placement:                   [  ] No    LINES: Central Lines [ ] Yes, if yes: Date of Placement                                     [  ] No    HOSPITAL MEDICATIONS:  MEDICATIONS  (STANDING):  albuterol/ipratropium for Nebulization 3 milliLiter(s) Nebulizer every 6 hours  amLODIPine   Tablet 10 milliGRAM(s) Oral daily  artificial  tears Solution 2 Drop(s) Both EYES four times a day  ascorbic acid 500 milliGRAM(s) Oral daily  calcium carbonate    500 mG (Tums) Chewable 1 Tablet(s) Chew every 12 hours  chlorhexidine 0.12% Liquid 15 milliLiter(s) Oral Mucosa every 12 hours  chlorhexidine 4% Liquid 1 Application(s) Topical <User Schedule>  dextrose 50% Injectable 12.5 Gram(s) IV Push once  dextrose 50% Injectable 25 Gram(s) IV Push once  escitalopram 10 milliGRAM(s) Oral <User Schedule>  fentaNYL   Patch  25 MICROgram(s)/Hr 1 Patch Transdermal every 72 hours  gabapentin Solution 100 milliGRAM(s) Enteral Tube at bedtime  glucagon  Injectable 1 milliGRAM(s) IntraMuscular once  hemorrhoidal Ointment 1 Application(s) Rectal daily  hemorrhoidal Ointment      insulin glargine Injectable (LANTUS) 19 Unit(s) SubCutaneous every morning  insulin lispro (ADMELOG) corrective regimen sliding scale   SubCutaneous every 6 hours  insulin regular  human recombinant 34 Unit(s) SubCutaneous <User Schedule>  insulin regular  human recombinant 15 Unit(s) SubCutaneous <User Schedule>  insulin regular  human recombinant 9 Unit(s) SubCutaneous <User Schedule>  levothyroxine 125 MICROGram(s) Oral <User Schedule>  lidocaine   4% Patch 1 Patch Transdermal daily  lidocaine   4% Patch 1 Patch Transdermal daily  lidocaine 2% Viscous 5 milliLiter(s) Mucosal two times a day  melatonin 1 milliGRAM(s) Oral at bedtime  melatonin 5 milliGRAM(s) Oral at bedtime  multivitamin/minerals/iron Oral Solution (CENTRUM) 15 milliLiter(s) Oral daily  nystatin Powder 1 Application(s) Topical every 8 hours  pantoprazole   Suspension 40 milliGRAM(s) Enteral Tube <User Schedule>  petrolatum Ophthalmic Ointment 1 Application(s) Both EYES four times a day  predniSONE   Tablet 5 milliGRAM(s) Oral daily  QUEtiapine 12.5 milliGRAM(s) Oral <User Schedule>  silver nitrate Applicator 1 Application(s) Topical once  simethicone 80 milliGRAM(s) Chew four times a day  zinc oxide 40% Paste 1 Application(s) Topical daily    MEDICATIONS  (PRN):  acetaminophen   Oral Liquid .. 1000 milliGRAM(s) Enteral Tube every 6 hours PRN Temp greater or equal to 38C (100.4F), Mild Pain (1 - 3)  benzocaine 20% Spray 1 Spray(s) Topical four times a day PRN Cough  diclofenac sodium 1% Gel 2 Gram(s) Topical four times a day PRN pain  diphenhydrAMINE Elixir 25 milliGRAM(s) Oral every 6 hours PRN Rash and/or Itching  guaifenesin/dextromethorphan Oral Liquid 10 milliLiter(s) Oral every 6 hours PRN Cough  sodium chloride 0.65% Nasal 1 Spray(s) Both Nostrils every 6 hours PRN Nasal Congestion      LABS:                  CAPILLARY BLOOD GLUCOSE    MICROBIOLOGY:     RADIOLOGY:  [ ] Reviewed and interpreted by me    Mode: AC/ CMV (Assist Control/ Continuous Mandatory Ventilation)  RR (machine): 12  TV (machine): 300  FiO2: 30  PEEP: 5  ITime: 1  MAP: 10  PIP: 28   Patient is a 73y old  Female who presents with a chief complaint of PEG Bleeding (22 Dec 2023 07:35)      Interval Events: no significant events     REVIEW OF SYSTEMS:  [ ] Positive  [x] All other systems negative  [ ] Unable to assess ROS because ________    Vital Signs Last 24 Hrs  T(C): 36.5 (01-05-24 @ 04:20), Max: 37 (01-04-24 @ 18:09)  T(F): 97.7 (01-05-24 @ 04:20), Max: 98.6 (01-04-24 @ 18:09)  HR: 85 (01-05-24 @ 05:47) (79 - 97)  BP: 130/60 (01-05-24 @ 04:20) (107/53 - 130/60)  RR: 18 (01-05-24 @ 04:20) (18 - 19)  SpO2: 100% (01-05-24 @ 05:47) (99% - 100%)    PHYSICAL EXAM:  HEENT:   [x]Tracheostomy:  [ ]Pupils equal  [ ]No oral lesions  [ ]Abnormal    SKIN  [x ]No Rash  [ ] Abnormal  [ ] pressure    CARDIAC  [x ]Regular  [ ]Abnormal    PULMONARY  [ ]Bilateral Clear Breath Sounds  [x ]Normal Excursion  [ ]Abnormal    GI  [x ]PEG      [ ] +BS		              [ ]Soft, nondistended, nontender	  [ ]Abnormal    MUSCULOSKELETAL                                   [x]Bedbound                 [ ]Abnormal    [ ]Ambulatory/OOB to chair                           EXTREMITIES                                         [ ]Normal  [x]Edema-- generalized non pitting edema                            NEUROLOGIC  [x ] Normal, non focal  [ ] Focal findings:    PSYCHIATRIC  [ x]Alert and appropriate  [ ] Sedated	 [ ]Agitated    :  Mcbride: [ ] Yes, if yes: Date of Placement:                   [ x] No    LINES: Central Lines [ ] Yes, if yes: Date of Placement                                     [ x ] No    HOSPITAL MEDICATIONS:  MEDICATIONS  (STANDING):  albuterol/ipratropium for Nebulization 3 milliLiter(s) Nebulizer every 6 hours  amLODIPine   Tablet 10 milliGRAM(s) Oral daily  artificial  tears Solution 2 Drop(s) Both EYES four times a day  ascorbic acid 500 milliGRAM(s) Oral daily  calcium carbonate    500 mG (Tums) Chewable 1 Tablet(s) Chew every 12 hours  chlorhexidine 0.12% Liquid 15 milliLiter(s) Oral Mucosa every 12 hours  chlorhexidine 4% Liquid 1 Application(s) Topical <User Schedule>  dextrose 50% Injectable 12.5 Gram(s) IV Push once  dextrose 50% Injectable 25 Gram(s) IV Push once  escitalopram 10 milliGRAM(s) Oral <User Schedule>  fentaNYL   Patch  25 MICROgram(s)/Hr 1 Patch Transdermal every 72 hours  gabapentin Solution 100 milliGRAM(s) Enteral Tube at bedtime  glucagon  Injectable 1 milliGRAM(s) IntraMuscular once  hemorrhoidal Ointment 1 Application(s) Rectal daily  hemorrhoidal Ointment      insulin glargine Injectable (LANTUS) 19 Unit(s) SubCutaneous every morning  insulin lispro (ADMELOG) corrective regimen sliding scale   SubCutaneous every 6 hours  insulin regular  human recombinant 34 Unit(s) SubCutaneous <User Schedule>  insulin regular  human recombinant 15 Unit(s) SubCutaneous <User Schedule>  insulin regular  human recombinant 9 Unit(s) SubCutaneous <User Schedule>  levothyroxine 125 MICROGram(s) Oral <User Schedule>  lidocaine   4% Patch 1 Patch Transdermal daily  lidocaine   4% Patch 1 Patch Transdermal daily  lidocaine 2% Viscous 5 milliLiter(s) Mucosal two times a day  melatonin 1 milliGRAM(s) Oral at bedtime  melatonin 5 milliGRAM(s) Oral at bedtime  multivitamin/minerals/iron Oral Solution (CENTRUM) 15 milliLiter(s) Oral daily  nystatin Powder 1 Application(s) Topical every 8 hours  pantoprazole   Suspension 40 milliGRAM(s) Enteral Tube <User Schedule>  petrolatum Ophthalmic Ointment 1 Application(s) Both EYES four times a day  predniSONE   Tablet 5 milliGRAM(s) Oral daily  QUEtiapine 12.5 milliGRAM(s) Oral <User Schedule>  silver nitrate Applicator 1 Application(s) Topical once  simethicone 80 milliGRAM(s) Chew four times a day  zinc oxide 40% Paste 1 Application(s) Topical daily    MEDICATIONS  (PRN):  acetaminophen   Oral Liquid .. 1000 milliGRAM(s) Enteral Tube every 6 hours PRN Temp greater or equal to 38C (100.4F), Mild Pain (1 - 3)  benzocaine 20% Spray 1 Spray(s) Topical four times a day PRN Cough  diclofenac sodium 1% Gel 2 Gram(s) Topical four times a day PRN pain  diphenhydrAMINE Elixir 25 milliGRAM(s) Oral every 6 hours PRN Rash and/or Itching  guaifenesin/dextromethorphan Oral Liquid 10 milliLiter(s) Oral every 6 hours PRN Cough  sodium chloride 0.65% Nasal 1 Spray(s) Both Nostrils every 6 hours PRN Nasal Congestion      LABS:                  CAPILLARY BLOOD GLUCOSE    MICROBIOLOGY:     RADIOLOGY:  [ ] Reviewed and interpreted by me    Mode: AC/ CMV (Assist Control/ Continuous Mandatory Ventilation)  RR (machine): 12  TV (machine): 300  FiO2: 30  PEEP: 5  ITime: 1  MAP: 10  PIP: 28   Patient is a 73y old  Female who presents with a chief complaint of PEG Bleeding (22 Dec 2023 07:35)    Interval Events: no significant events     REVIEW OF SYSTEMS:  [ ] Positive  [x] All other systems negative  [ ] Unable to assess ROS because ________    Vital Signs Last 24 Hrs  T(C): 36.5 (01-05-24 @ 04:20), Max: 37 (01-04-24 @ 18:09)  T(F): 97.7 (01-05-24 @ 04:20), Max: 98.6 (01-04-24 @ 18:09)  HR: 85 (01-05-24 @ 05:47) (79 - 97)  BP: 130/60 (01-05-24 @ 04:20) (107/53 - 130/60)  RR: 18 (01-05-24 @ 04:20) (18 - 19)  SpO2: 100% (01-05-24 @ 05:47) (99% - 100%)    PHYSICAL EXAM:  HEENT:   [x]Tracheostomy:  [ ]Pupils equal  [ ]No oral lesions  [ ]Abnormal    SKIN  [x ]No Rash  [ ] Abnormal  [ ] pressure    CARDIAC  [x ]Regular  [ ]Abnormal    PULMONARY  [ ]Bilateral Clear Breath Sounds  [x ]Normal Excursion  [ ]Abnormal    GI  [x ]PEG      [ ] +BS		              [ ]Soft, nondistended, nontender	  [ ]Abnormal    MUSCULOSKELETAL                                   [x]Bedbound                 [ ]Abnormal    [ ]Ambulatory/OOB to chair                           EXTREMITIES                                         [ ]Normal  [x]Edema-- generalized non pitting edema                            NEUROLOGIC  [x ] Normal, non focal  [ ] Focal findings:    PSYCHIATRIC  [ x]Alert and appropriate  [ ] Sedated	 [ ]Agitated    :  Mcbride: [ ] Yes, if yes: Date of Placement:                   [ x] No    LINES: Central Lines [ ] Yes, if yes: Date of Placement                                     [ x ] No    HOSPITAL MEDICATIONS:  MEDICATIONS  (STANDING):  albuterol/ipratropium for Nebulization 3 milliLiter(s) Nebulizer every 6 hours  amLODIPine   Tablet 10 milliGRAM(s) Oral daily  artificial  tears Solution 2 Drop(s) Both EYES four times a day  ascorbic acid 500 milliGRAM(s) Oral daily  calcium carbonate    500 mG (Tums) Chewable 1 Tablet(s) Chew every 12 hours  chlorhexidine 0.12% Liquid 15 milliLiter(s) Oral Mucosa every 12 hours  chlorhexidine 4% Liquid 1 Application(s) Topical <User Schedule>  dextrose 50% Injectable 12.5 Gram(s) IV Push once  dextrose 50% Injectable 25 Gram(s) IV Push once  escitalopram 10 milliGRAM(s) Oral <User Schedule>  fentaNYL   Patch  25 MICROgram(s)/Hr 1 Patch Transdermal every 72 hours  gabapentin Solution 100 milliGRAM(s) Enteral Tube at bedtime  glucagon  Injectable 1 milliGRAM(s) IntraMuscular once  hemorrhoidal Ointment 1 Application(s) Rectal daily  hemorrhoidal Ointment      insulin glargine Injectable (LANTUS) 19 Unit(s) SubCutaneous every morning  insulin lispro (ADMELOG) corrective regimen sliding scale   SubCutaneous every 6 hours  insulin regular  human recombinant 34 Unit(s) SubCutaneous <User Schedule>  insulin regular  human recombinant 15 Unit(s) SubCutaneous <User Schedule>  insulin regular  human recombinant 9 Unit(s) SubCutaneous <User Schedule>  levothyroxine 125 MICROGram(s) Oral <User Schedule>  lidocaine   4% Patch 1 Patch Transdermal daily  lidocaine   4% Patch 1 Patch Transdermal daily  lidocaine 2% Viscous 5 milliLiter(s) Mucosal two times a day  melatonin 1 milliGRAM(s) Oral at bedtime  melatonin 5 milliGRAM(s) Oral at bedtime  multivitamin/minerals/iron Oral Solution (CENTRUM) 15 milliLiter(s) Oral daily  nystatin Powder 1 Application(s) Topical every 8 hours  pantoprazole   Suspension 40 milliGRAM(s) Enteral Tube <User Schedule>  petrolatum Ophthalmic Ointment 1 Application(s) Both EYES four times a day  predniSONE   Tablet 5 milliGRAM(s) Oral daily  QUEtiapine 12.5 milliGRAM(s) Oral <User Schedule>  silver nitrate Applicator 1 Application(s) Topical once  simethicone 80 milliGRAM(s) Chew four times a day  zinc oxide 40% Paste 1 Application(s) Topical daily    MEDICATIONS  (PRN):  acetaminophen   Oral Liquid .. 1000 milliGRAM(s) Enteral Tube every 6 hours PRN Temp greater or equal to 38C (100.4F), Mild Pain (1 - 3)  benzocaine 20% Spray 1 Spray(s) Topical four times a day PRN Cough  diclofenac sodium 1% Gel 2 Gram(s) Topical four times a day PRN pain  diphenhydrAMINE Elixir 25 milliGRAM(s) Oral every 6 hours PRN Rash and/or Itching  guaifenesin/dextromethorphan Oral Liquid 10 milliLiter(s) Oral every 6 hours PRN Cough  sodium chloride 0.65% Nasal 1 Spray(s) Both Nostrils every 6 hours PRN Nasal Congestion      LABS:                  CAPILLARY BLOOD GLUCOSE    MICROBIOLOGY:     RADIOLOGY:  [ ] Reviewed and interpreted by me    Mode: AC/ CMV (Assist Control/ Continuous Mandatory Ventilation)  RR (machine): 12  TV (machine): 300  FiO2: 30  PEEP: 5  ITime: 1  MAP: 10  PIP: 28

## 2024-01-05 NOTE — PROGRESS NOTE ADULT - SUBJECTIVE AND OBJECTIVE BOX
seen earlier today     Chief Complaint: Diabetes Mellitus follow up    INTERVAL HX: tolerating TF at goal , 1800 BG remains above goal       Review of Systems:  General: As above  GI: No nausea, vomiting  Endocrine: no  S&Sx of hypoglycemia    Allergies    penicillin (Unknown)  heparin (Unknown)  Tagamet (Unknown)  pineapple (Unknown)  walnut (Unknown)  Pecan, Filbert, Hazelnut (Unknown)  Lyrica (Unknown)    Intolerances      MEDICATIONS  (STANDING):  albuterol/ipratropium for Nebulization 3 milliLiter(s) Nebulizer every 6 hours  amLODIPine   Tablet 10 milliGRAM(s) Oral daily  artificial  tears Solution 2 Drop(s) Both EYES four times a day  ascorbic acid 500 milliGRAM(s) Oral daily  calcium carbonate    500 mG (Tums) Chewable 1 Tablet(s) Chew every 12 hours  chlorhexidine 0.12% Liquid 15 milliLiter(s) Oral Mucosa every 12 hours  chlorhexidine 4% Liquid 1 Application(s) Topical <User Schedule>  dextrose 50% Injectable 12.5 Gram(s) IV Push once  dextrose 50% Injectable 25 Gram(s) IV Push once  escitalopram 10 milliGRAM(s) Oral <User Schedule>  fentaNYL   Patch  25 MICROgram(s)/Hr 1 Patch Transdermal every 72 hours  gabapentin Solution 100 milliGRAM(s) Enteral Tube at bedtime  glucagon  Injectable 1 milliGRAM(s) IntraMuscular once  hemorrhoidal Ointment 1 Application(s) Rectal daily  hemorrhoidal Ointment      insulin glargine Injectable (LANTUS) 19 Unit(s) SubCutaneous every morning  insulin lispro (ADMELOG) corrective regimen sliding scale   SubCutaneous every 6 hours  insulin regular  human recombinant 17 Unit(s) SubCutaneous <User Schedule>  insulin regular  human recombinant 34 Unit(s) SubCutaneous <User Schedule>  insulin regular  human recombinant 9 Unit(s) SubCutaneous <User Schedule>  levothyroxine 125 MICROGram(s) Oral <User Schedule>  lidocaine   4% Patch 1 Patch Transdermal daily  lidocaine   4% Patch 1 Patch Transdermal daily  lidocaine 2% Viscous 5 milliLiter(s) Mucosal two times a day  melatonin 1 milliGRAM(s) Oral at bedtime  melatonin 5 milliGRAM(s) Oral at bedtime  multivitamin/minerals/iron Oral Solution (CENTRUM) 15 milliLiter(s) Oral daily  nystatin Powder 1 Application(s) Topical every 8 hours  pantoprazole   Suspension 40 milliGRAM(s) Enteral Tube <User Schedule>  petrolatum Ophthalmic Ointment 1 Application(s) Both EYES four times a day  predniSONE   Tablet 5 milliGRAM(s) Oral daily  QUEtiapine 12.5 milliGRAM(s) Oral <User Schedule>  silver nitrate Applicator 1 Application(s) Topical once  simethicone 80 milliGRAM(s) Chew four times a day  zinc oxide 40% Paste 1 Application(s) Topical daily        insulin glargine Injectable (LANTUS)   19 Unit(s) SubCutaneous (01-05-24 @ 08:26)    insulin lispro (ADMELOG) corrective regimen sliding scale   1 Unit(s) SubCutaneous (01-05-24 @ 12:13)   1 Unit(s) SubCutaneous (01-05-24 @ 06:16)   1 Unit(s) SubCutaneous (01-04-24 @ 23:58)   2 Unit(s) SubCutaneous (01-04-24 @ 18:33)   1  SubCutaneous (01-04-24 @ 14:06)    insulin regular  human recombinant   9 Unit(s) SubCutaneous (01-04-24 @ 18:32)    insulin regular  human recombinant   17 Unit(s) SubCutaneous (01-05-24 @ 12:13)    insulin regular  human recombinant   34 Unit(s) SubCutaneous (01-05-24 @ 06:17)    levothyroxine   125 MICROGram(s) Oral (01-05-24 @ 05:22)    predniSONE   Tablet   5 milliGRAM(s) Oral (01-05-24 @ 05:18)        PHYSICAL EXAM:  VITALS: T(C): 36.5 (01-05-24 @ 04:20)  T(F): 97.7 (01-05-24 @ 04:20), Max: 98.6 (01-04-24 @ 18:09)  HR: 85 (01-05-24 @ 12:05) (79 - 96)  BP: 130/60 (01-05-24 @ 04:20) (107/53 - 130/60)  RR:  (18 - 19)  SpO2:  (99% - 100%)  Wt(kg): --  GENERAL: NAD  , peg   Respiratory: Respirations unlabored  trach  Extremities: Warm, no edema  NEURO: Alert , appropriate     LABS:  POCT Blood Glucose.: 159 mg/dL (01-05-24 @ 11:35)  POCT Blood Glucose.: 177 mg/dL (01-05-24 @ 05:52)  POCT Blood Glucose.: 157 mg/dL (01-04-24 @ 23:48)  POCT Blood Glucose.: 210 mg/dL (01-04-24 @ 17:52)  POCT Blood Glucose.: 195 mg/dL (01-04-24 @ 11:57)  POCT Blood Glucose.: 166 mg/dL (01-04-24 @ 05:35)  POCT Blood Glucose.: 137 mg/dL (01-03-24 @ 23:51)  POCT Blood Glucose.: 195 mg/dL (01-03-24 @ 17:13)  POCT Blood Glucose.: 199 mg/dL (01-03-24 @ 11:52)  POCT Blood Glucose.: 166 mg/dL (01-03-24 @ 05:38)  POCT Blood Glucose.: 194 mg/dL (01-02-24 @ 23:56)  POCT Blood Glucose.: 180 mg/dL (01-02-24 @ 17:43)              11-25 Chol -- Direct LDL -- LDL calculated -- HDL -- Trig 192<H>  Thyroid Function Tests:      A1C with Estimated Average Glucose Result: 7.5 % (10-28-23 @ 07:18)    Estimated Average Glucose: 169 mg/dL (10-28-23 @ 07:18)        Diet, NPO with Tube Feed:   Tube Feeding Modality: Gastrostomy  Casie Frazier glucose support 1.2  Total Volume for 24 Hours (mL): 1200  Continuous  Starting Tube Feed Rate mL per Hour: 60  Increase Tube Feed Rate by (mL): 0  Until Goal Tube Feed Rate (mL per Hour): 60  Tube Feed Duration (in Hours): 20  Tube Feed Start Time: 06:00  Tube Feed Stop Time: 02:00  Free Water Flush  Pump   Rate (mL per Hour): 10   Frequency: Every 2 Hours  Alfred(7 Gm Arginine/7 Gm Glut/1.2 Gm HMB     Qty per Day:  2 (01-01-24 @ 08:00) [Active]

## 2024-01-05 NOTE — PROGRESS NOTE ADULT - PROBLEM SELECTOR PLAN 11
GOC: Long discussion of RCU team with daughter, pt remains FULL CODE GOC: Long discussion of RCU team with daughter, pt remains FULL CODE  -- 1/5-- Dr Roman explained to pt's daughter re: GOC: Long discussion of RCU team with daughter, pt remains FULL CODE  -- 1/5-- Dr Roman explained to pt's daughter re: complications with long tracheostomy dependance. Pt's daughter is concerned that pt has been failing PMV's trials.

## 2024-01-05 NOTE — PROGRESS NOTE ADULT - ASSESSMENT
74 y/o F w/h/o T2DM with unknown control due to anemia of chronic disease skewing A1C levels. On Levemir and Humalog insulin PTA. Unknown DM complications. Also h/o HTN, HLD, anemia, hypothyroidism, RA, fibromyalgia, remote cerebral aneurysm repair, acute hypercapnic respiratory failure now with tracheostomy, PEG tube, and bedbound (trach change 8/18, PEG change 8/14), sacral pressure ulcer, BIBEMS from home for 6 day hx of bleeding from the tracheostomy and PEG tube with associated abdominal pain> now resolved. Endocrinology consulted for hyperglycemia. BG Goal 100-180mg/dl   Tolerating tube feeding at goal ( 60 ml, 6am- 2 am ),        Type 2 diabetes mellitus with hyperglycemia, with long-term current use of insulin.   ·  Plan:   - FS BG monitoring [id number] while on TFs/NPO   - FBG at goal continue with  lantus 19 units sc qAM   - 1800 BG above goal continue with   Regular insulin 34units sc at 6am, increase to 17 units sc at 12 noon and 9 units sc at 6pm. PLEASE DO NOT HOLD IF PATIENT IS ON TUBE FEEDS (hold only if TF are stopped). Can decrease/adjust dose if there is a concern for hypoglycemia.   - C/w low dose admelog correction scale every 6 hours  - please inform endocrine team if there are any changes in tube feeding( formula or rate)     DISCHARGE:  - Will be determined according to BG/insulin needs/TFs and steroid therapy at time of discharge.    - Needs telemedicine as outpatient due to bedbound status. Can follow up at [facility]. [address] [place] [address] [address]. Phone . Make sure pt has Rx for all DM supplies and insulin.     Problem/Plan - 2:  ·  Problem: Secondary adrenal insufficiency.   ·  Plan: - Due to chronic steroid use pt is at risk of tertiary AI, please do not abruptly discontinue or hold steroids as this can result in an adrenal crisis   - c/w prednisone 5mg daily  - hemodynamically stable at present time.     Problem/Plan - 3:  ·  Problem: Hypothyroidism.   ·  Plan: TSH and free thyroxine wnl 11/28/23  Recommendations:   - continue levothyroxine 125mcg daily  - Please make sure LT4 is given on an empty stomach at least 30 mins to 1 hour apart from other meds and food/TFs to ensure med absorption. DO NOT GIVE WITH VITAMINS/ANTACIDS.     Problem/Plan - 4:  ·  Problem: HTN (hypertension).   ·  Plan: - BP goal <130/80  - Manage per primary team.  - currently on amlodipine 10mg daily.     Problem/Plan - 5:  ·  Problem: HLD (hyperlipidemia).   ·  Plan: - LDL goal <70 due to DM  - Outpatient fasting lipid profile   - Consider moderate intensity Statin if not contraindicated and based on risks/benefits for pt  - Manage per primary team          Contact via Microsoft Teams during business hours  To reach covering provider access AMION via sunrise tools  For Urgent matters/after-hours/weekends/holidays please page endocrine fellow on call   For nonurgent matters please email [email or web address]    Please note that this patient may be followed by different provider tomorrow.  Notify endocrine 24 hours prior to discharge for final recommendations              74 y/o F w/h/o T2DM with unknown control due to anemia of chronic disease skewing A1C levels. On Levemir and Humalog insulin PTA. Unknown DM complications. Also h/o HTN, HLD, anemia, hypothyroidism, RA, fibromyalgia, remote cerebral aneurysm repair, acute hypercapnic respiratory failure now with tracheostomy, PEG tube, and bedbound (trach change 8/18, PEG change 8/14), sacral pressure ulcer, BIBEMS from home for 6 day hx of bleeding from the tracheostomy and PEG tube with associated abdominal pain> now resolved. Endocrinology consulted for hyperglycemia. BG Goal 100-180mg/dl   Tolerating tube feeding at goal ( 60 ml, 6am- 2 am ),        Type 2 diabetes mellitus with hyperglycemia, with long-term current use of insulin.   ·  Plan:   - FS BG monitoring [id number] while on TFs/NPO   - FBG at goal continue with  lantus 19 units sc qAM   - 1800 BG above goal continue with   Regular insulin 34units sc at 6am, increase to 17 units sc at 12 noon and 9 units sc at 6pm. PLEASE DO NOT HOLD IF PATIENT IS ON TUBE FEEDS (hold only if TF are stopped). Can decrease/adjust dose if there is a concern for hypoglycemia.   - C/w low dose admelog correction scale every 6 hours  - please inform endocrine team if there are any changes in tube feeding( formula or rate)     DISCHARGE:  - Will be determined according to BG/insulin needs/TFs and steroid therapy at time of discharge.    - Needs telemedicine as outpatient due to bedbound status. Can follow up at [facility]. [address] [place] [address] [address]. Phone . Make sure pt has Rx for all DM supplies and insulin.     Problem/Plan - 2:  ·  Problem: Secondary adrenal insufficiency.   ·  Plan: - Due to chronic steroid use pt is at risk of tertiary AI, please do not abruptly discontinue or hold steroids as this can result in an adrenal crisis   - c/w prednisone 5mg daily  - hemodynamically stable at present time.     Problem/Plan - 3:  ·  Problem: Hypothyroidism.   ·  Plan: TSH and free thyroxine wnl 11/28/23  Recommendations:   - continue levothyroxine 125mcg daily  - Please make sure LT4 is given on an empty stomach at least 30 mins to 1 hour apart from other meds and food/TFs to ensure med absorption. DO NOT GIVE WITH VITAMINS/ANTACIDS.     Problem/Plan - 4:  ·  Problem: HTN (hypertension).   ·  Plan: - BP goal <130/80  - Manage per primary team.  - currently on amlodipine 10mg daily.     Problem/Plan - 5:  ·  Problem: HLD (hyperlipidemia).   ·  Plan: - LDL goal <70 due to DM  - Outpatient fasting lipid profile   - Consider moderate intensity Statin if not contraindicated and based on risks/benefits for pt  - Manage per primary team          Contact via Microsoft Teams during business hours  To reach covering provider access AMION via sunrise tools  For Urgent matters/after-hours/weekends/holidays please page endocrine fellow on call   For nonurgent matters please email [email or web address]    Please note that this patient may be followed by different provider tomorrow.  Notify endocrine 24 hours prior to discharge for final recommendations

## 2024-01-05 NOTE — CHART NOTE - NSCHARTNOTEFT_GEN_A_CORE
Nutrition Follow Up Note  Patient seen for: follow up    Source: [] Patient       [x] Medical Record        [x] Nursing        [] Family at bedside       [] Other:    -If unable to interview patient: [x] Trach/Vent/BiPAP  [] Disoriented/confused/inappropriate to interview    Diet, NPO with Tube Feed:   Tube Feeding Modality: Gastrostomy  Casie Frazier glucose support 1.2  Total Volume for 24 Hours (mL): 1200  Continuous  Starting Tube Feed Rate {mL per Hour}: 60  Increase Tube Feed Rate by (mL): 0  Until Goal Tube Feed Rate (mL per Hour): 60  Tube Feed Duration (in Hours): 20  Tube Feed Start Time: 06:00  Tube Feed Stop Time: 02:00  Free Water Flush  Pump   Rate (mL per Hour): 10   Frequency: Every 2 Hours  Alfred(7 Gm Arginine/7 Gm Glut/1.2 Gm HMB     Qty per Day:  2 (24 @ 08:00) [Active]    EN order providing if 100% provision: 1440kcal and 77g protein.     - Is current order appropriate/adequate? [x] Yes  []  No:     Nutrition related concerns:  -Free water flush per team, above  -Hgb A1c 7.5%. POCT Blood glucose being monitored. on insulin regimen to maintain glycemic control. Formula changed from Peptide to glucose support to aid in glycemic control.   -oral synthroid ordered  -Pt vegan, but per previous discussion with daughter, Alfred is okay. See nutrition note .     GI:  Last BM .  Bowel Regimen? [] Yes   [x] No    Weights:   Dosing weight 54.6kg  10/29:  reports pt UBW 90 pounds prior to illness (~41kg).   Daily Weight in k.4 (), Daily Weight in k.3 () 54.4 (), Daily Weight in k.3 (), Daily Weight in k.9 (), Daily Weight in k.9 ()    MEDICATIONS  (STANDING):  amLODIPine   Tablet  ascorbic acid  calcium carbonate    500 mG (Tums) Chewable  dextrose 50% Injectable  dextrose 50% Injectable  glucagon  Injectable  insulin glargine Injectable (LANTUS)  insulin lispro (ADMELOG) corrective regimen sliding scale  insulin regular  human recombinant  insulin regular  human recombinant  insulin regular  human recombinant  levothyroxine  multivitamin/minerals/iron Oral Solution (CENTRUM)  pantoprazole   Suspension  predniSONE   Tablet  simethicone    Pertinent Labs:   A1C with Estimated Average Glucose Result: 7.5 % (10-28-23 @ 07:18)    Finger Sticks:  POCT Blood Glucose.: 159 mg/dL ( @ 11:35)  POCT Blood Glucose.: 177 mg/dL ( @ 05:52)  POCT Blood Glucose.: 157 mg/dL ( @ 23:48)  POCT Blood Glucose.: 210 mg/dL ( @ 17:52)    Skin per nursing documentation: sacrum stage IV.   Edema per nursing documentation: most recent documented  1+ generalized    Estimated Needs using IBW + 10% considering increased needs and edema (48kg)  27-32kcal/kg 1296-1536kcal/day  1.3-1.6g/kg 62-77g protein/day  defer fluid needs to team    Previous Nutrition Diagnosis:  Increased Nutrient Needs  Nutrition Diagnosis is: [x] ongoing  [] resolved [] not applicable     Nutrition Care Plan:  [x] In Progress  [] Achieved  [] Not applicable    New Nutrition Diagnosis: [x] Not applicable    Nutrition Interventions:     Education Provided   [] Yes:  [x] No:     Recommendations:      -Can continue [employer] Glucose Support at 60ml/hr x 20 hours to provide a total volume of 1200ml, 1440kcal (30kcal/kg), 77g protein (1.6g/kg). 20 hour feed will allow time for synthroid administration, noted to be ordered to be given at 05:00.   -Alfred will provide additional glutamine and arginine amino acids with collagen for wound healing. continue.   -Continue Vitamin C and Multivitamin to aid in wound healing.   -Defer free water flush to team    Monitoring and Evaluation:   Continue to monitor nutritional intake, tolerance to diet prescription, weights, labs, skin integrity    RD remains available upon request and will follow up per protocol  Rufina Morris, MS, RD, CDN / Teams Nutrition Follow Up Note  Patient seen for: follow up    Source: [] Patient       [x] Medical Record        [x] Nursing        [] Family at bedside       [] Other:    -If unable to interview patient: [x] Trach/Vent/BiPAP  [] Disoriented/confused/inappropriate to interview    Diet, NPO with Tube Feed:   Tube Feeding Modality: Gastrostomy  Casie Frazier glucose support 1.2  Total Volume for 24 Hours (mL): 1200  Continuous  Starting Tube Feed Rate {mL per Hour}: 60  Increase Tube Feed Rate by (mL): 0  Until Goal Tube Feed Rate (mL per Hour): 60  Tube Feed Duration (in Hours): 20  Tube Feed Start Time: 06:00  Tube Feed Stop Time: 02:00  Free Water Flush  Pump   Rate (mL per Hour): 10   Frequency: Every 2 Hours  Alfred(7 Gm Arginine/7 Gm Glut/1.2 Gm HMB     Qty per Day:  2 (24 @ 08:00) [Active]    EN order providing if 100% provision: 1440kcal and 77g protein.     - Is current order appropriate/adequate? [x] Yes  []  No:     Nutrition related concerns:  -Free water flush per team, above  -Hgb A1c 7.5%. POCT Blood glucose being monitored. on insulin regimen to maintain glycemic control. Formula changed from Peptide to glucose support to aid in glycemic control.   -oral synthroid ordered  -Pt vegan, but per previous discussion with daughter, Alfred is okay. See nutrition note .     GI:  Last BM .  Bowel Regimen? [] Yes   [x] No    Weights:   Dosing weight 54.6kg  10/29:  reports pt UBW 90 pounds prior to illness (~41kg).   Daily Weight in k.4 (), Daily Weight in k.3 () 54.4 (), Daily Weight in k.3 (), Daily Weight in k.9 (), Daily Weight in k.9 ()    MEDICATIONS  (STANDING):  amLODIPine   Tablet  ascorbic acid  calcium carbonate    500 mG (Tums) Chewable  dextrose 50% Injectable  dextrose 50% Injectable  glucagon  Injectable  insulin glargine Injectable (LANTUS)  insulin lispro (ADMELOG) corrective regimen sliding scale  insulin regular  human recombinant  insulin regular  human recombinant  insulin regular  human recombinant  levothyroxine  multivitamin/minerals/iron Oral Solution (CENTRUM)  pantoprazole   Suspension  predniSONE   Tablet  simethicone    Pertinent Labs:   A1C with Estimated Average Glucose Result: 7.5 % (10-28-23 @ 07:18)    Finger Sticks:  POCT Blood Glucose.: 159 mg/dL ( @ 11:35)  POCT Blood Glucose.: 177 mg/dL ( @ 05:52)  POCT Blood Glucose.: 157 mg/dL ( @ 23:48)  POCT Blood Glucose.: 210 mg/dL ( @ 17:52)    Skin per nursing documentation: sacrum stage IV.   Edema per nursing documentation: most recent documented  1+ generalized    Estimated Needs using IBW + 10% considering increased needs and edema (48kg)  27-32kcal/kg 1296-1536kcal/day  1.3-1.6g/kg 62-77g protein/day  defer fluid needs to team    Previous Nutrition Diagnosis:  Increased Nutrient Needs  Nutrition Diagnosis is: [x] ongoing  [] resolved [] not applicable     Nutrition Care Plan:  [x] In Progress  [] Achieved  [] Not applicable    New Nutrition Diagnosis: [x] Not applicable    Nutrition Interventions:     Education Provided   [] Yes:  [x] No:     Recommendations:      -Can continue [employer] Glucose Support at 60ml/hr x 20 hours to provide a total volume of 1200ml, 1440kcal (30kcal/kg), 77g protein (1.6g/kg). 20 hour feed will allow time for synthroid administration, noted to be ordered to be given at 05:00.   -Alfred will provide additional glutamine and arginine amino acids with collagen for wound healing. continue.   -Continue Vitamin C and Multivitamin to aid in wound healing.   -Defer free water flush to team    Monitoring and Evaluation:   Continue to monitor nutritional intake, tolerance to diet prescription, weights, labs, skin integrity    RD remains available upon request and will follow up per protocol  Rufina Morris, MS, RD, CDN / Teams

## 2024-01-05 NOTE — PROGRESS NOTE ADULT - ASSESSMENT
74 y/o F with PMHx of T2DM, HTN, HLD, anemia, hypothyroidism, RA, fibromyalgia, remote cerebral aneurysm repair, acute hypercapnic respiratory failure now with tracheostomy, PEG tube, and bedbound (trach change 8/18, PEG change 8/14), sacral pressure ulcer, BIBEMS from home for 6 day hx of bleeding from the tracheostomy and PEG tube with associated abdominal pain, recent history of auditory and visual hallucinations, and with chronic sacral decubitus ulcer. Exam notable for mild abdominal distention with LLQ and RLQ tenderness, tracheostomy in place with no obvious bleeding, PEG tube with scant amount of dried blood, at baseline neurologic status. Imaging showing no active bleeding around tracheostomy site, however was notable for mild thickening along PEG tube tract, sacral ulcer extending to the coccyx suggestive of possible osteomyelitis, and bibasilar patchy lung opacities with volume loss. Patient admitted for respiratory support, wound care of tracheostomy and PEG, and management of sacral osteomyelitis. No further Trach and peg site bleeding noted since admission.  Pelvic MRI imaging also suggestive of Acute OM. Patient placed on long-term antibiotics with Infectious disease guidance.  Additionally treated with a 7d course of Cefepime due to PSA and Serratia tracheitis on 11/8-->11/15 and then resumed cipro/keflex.  Hospital course c/b Delirium for which Seroquel was added and home Lexapro continued. Tracheostomy changed to #9 Cuffed Port[place] by ENT 11/3.  Despite trach change patient remained with persistent air leak.  On 11/19, the trach was again changed due to leak and loss of volumes on vent.  Trach was changed to #8 distal cuffed Shiley.  Patient seen by Dermatology for back lesions.  One diagnosed as a seborrheic keratitis and the other a dermatofibroma.  Intermittent abdominal pain throughout hospital course, at times relieved with gas/BM, w/u negative, seen by GI - no recs.  12/10 pt completed cipro and keflex for osteo treatment.  12/13 moderate amounts of secretions w/ blood - tranexamic acid neb given with notable improvement.  12/18 with blood tinged secretion again - TXA 250mg neb x2 doses.  12/19 spiked fever to 102 - pancultured, negative w/u.  12/19 silver Nitrate applied to PEG site by GI for leakage.  Pt has since remained medically stable.  Receives continuous pulmonary toileting w/ duoneb, chest PT, and suctioning PRN.      1/4:  Pt remains medically stable.  Afebrile, hemodynamically stable.  Blood tinged secretions have improved and remain intermittent.   72 y/o F with PMHx of T2DM, HTN, HLD, anemia, hypothyroidism, RA, fibromyalgia, remote cerebral aneurysm repair, acute hypercapnic respiratory failure now with tracheostomy, PEG tube, and bedbound (trach change 8/18, PEG change 8/14), sacral pressure ulcer, BIBEMS from home for 6 day hx of bleeding from the tracheostomy and PEG tube with associated abdominal pain, recent history of auditory and visual hallucinations, and with chronic sacral decubitus ulcer. Exam notable for mild abdominal distention with LLQ and RLQ tenderness, tracheostomy in place with no obvious bleeding, PEG tube with scant amount of dried blood, at baseline neurologic status. Imaging showing no active bleeding around tracheostomy site, however was notable for mild thickening along PEG tube tract, sacral ulcer extending to the coccyx suggestive of possible osteomyelitis, and bibasilar patchy lung opacities with volume loss. Patient admitted for respiratory support, wound care of tracheostomy and PEG, and management of sacral osteomyelitis. No further Trach and peg site bleeding noted since admission.  Pelvic MRI imaging also suggestive of Acute OM. Patient placed on long-term antibiotics with Infectious disease guidance.  Additionally treated with a 7d course of Cefepime due to PSA and Serratia tracheitis on 11/8-->11/15 and then resumed cipro/keflex.  Hospital course c/b Delirium for which Seroquel was added and home Lexapro continued. Tracheostomy changed to #9 Cuffed Portex by ENT 11/3.  Despite trach change patient remained with persistent air leak.  On 11/19, the trach was again changed due to leak and loss of volumes on vent.  Trach was changed to #8 distal cuffed Shiley.  Patient seen by Dermatology for back lesions.  One diagnosed as a seborrheic keratitis and the other a dermatofibroma.  Intermittent abdominal pain throughout hospital course, at times relieved with gas/BM, w/u negative, seen by GI - no recs.  12/10 pt completed cipro and keflex for osteo treatment.  12/13 moderate amounts of secretions w/ blood - tranexamic acid neb given with notable improvement.  12/18 with blood tinged secretion again - TXA 250mg neb x2 doses.  12/19 spiked fever to 102 - pancultured, negative w/u.  12/19 silver Nitrate applied to PEG site by GI for leakage.  Pt has since remained medically stable.  Receives continuous pulmonary toileting w/ duoneb, chest PT, and suctioning PRN.      1/4:  Pt remains medically stable.  Afebrile, hemodynamically stable.  Blood tinged secretions have improved and remain intermittent.

## 2024-01-05 NOTE — PROGRESS NOTE ADULT - PROBLEM SELECTOR PLAN 6
- Echo from 10/17/2020 notable for hyperdynamic L ventricular systolic function, moderately severe LVOT obstruction, and EF 81% (per Wilkinson calculation) vs 77% (per Teichholz calculation)  - Amlodipine 10mg QD - Echo from 10/17/2020 notable for hyperdynamic L ventricular systolic function, moderately severe LVOT obstruction, and EF 81% (per Wilkinson calculation) vs 77% (per Teichholz calculation)  - Amlodipine 10mg QD__ BP is well controlled

## 2024-01-05 NOTE — PROGRESS NOTE ADULT - PROBLEM SELECTOR PLAN 8
- Insulin regimen as per Endocrine team  - Endocrine recommendations appreciated. - Insulin regimen as per Endocrine team  - Endocrine recommendations appreciated.  -- - 1800 BG above goal continue with __ Regular insulin 34units sc at 6am, increase to 17 units sc at 12 noon and 9 units sc at 6pm.__ >177,

## 2024-01-05 NOTE — PROGRESS NOTE ADULT - PROBLEM SELECTOR PLAN 2
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   - S/p CT abd/pelvis (10/28): Sacral decubitus ulcer extending to the coccyx with osseous remodeling and pelvic MRI or bone scan to recc to exclude osteomyelitis.  - 10/30 wound care note: Sacral stage 4 pressure injury 4.5cm x 2.5cm x 1.5cm w/ lip of undermining circumferentially greatest 9-12 of 3cm.  - MRI pelvis 10/30 with Osteomyelitis, completed Cefepime for 7 days, Vancomycin d/marli   - 11/10: resumed Cipro 500mg q 12 and Keflex 500mg q 6 until 12/10.  - 11/20: Changed to IV Cipro 400 q12 as recommended by ID pharmacist due to multiple drug interactions when given via PEG  - 11/28 wound care note: Sacral stage 4pressure injury 4.5cm x 2.5cm x 0.5cm, moist granular wound bed   palpable but not exposed bone no blistering, (+) serosanguinous drainage. No odor, erythema, increased warmth, tenderness, induration, fluctuance, nor crepitus.  - 12/3 wound culture ordered - d/c'd as no need for culture, wound improving, no signs of infection  - 12/10 completed cipro and keflex x5 week course  - wound improving   - wound care following 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   - S/p CT abd/pelvis (10/28): Sacral decubitus ulcer extending to the coccyx with osseous remodeling and pelvic MRI or bone scan to recc to exclude osteomyelitis.  - 10/30 wound care note: Sacral stage 4 pressure injury 4.5cm x 2.5cm x 1.5cm w/ lip of undermining circumferentially greatest 9-12 of 3cm.  - MRI pelvis 10/30 with Osteomyelitis, completed Cefepime for 7 days, Vancomycin d/marli   - 11/10: resumed Cipro 500mg q 12 and Keflex 500mg q 6 until 12/10.  - 11/20: Changed to IV Cipro 400 q12 as recommended by ID pharmacist due to multiple drug interactions when given via PEG  - 11/28 wound care note: Sacral stage 4 pressure injury 4.5cm x 2.5cm x 0.5cm, moist granular wound bed   palpable but not exposed bone no blistering, (+) serosanguinous drainage. No odor, erythema, increased warmth, tenderness, induration, fluctuance, nor crepitus.  - 12/3 wound culture ordered - d/c'd as no need for culture, wound improving, no signs of infection  - 12/10 completed cipro and keflex x5 week course  - wound improving, - wound care following

## 2024-01-05 NOTE — PROGRESS NOTE ADULT - PROBLEM SELECTOR PLAN 10
DVT PPx: Lovenox discontinued given daughter's concern for trach and PEG stoma bleeding/oozing.  Also with mild thrombocytopenia.  Will continue SCDs. DVT PPx: Lovenox discontinued given daughter's concern for trach and PEG stoma bleeding/oozing.  Also with mild thrombocytopenia.  Will continue SCDs and no chemical DVT prophylaxis

## 2024-01-05 NOTE — PROGRESS NOTE ADULT - PROBLEM SELECTOR PLAN 5
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   - PEG Site appears macerated --> Multifactorial, possible the PEG has been too tight at home  - Peg loosened by GI at beside, no leakage or further intervention required   - recurrent RLQ abdominal pain and bleeding at the PEG site  - reevaluated by GI -- no bleeding at the PEG site  - 12/1: s/p abdominal ultrasound - limited study   - (12/3): Pt is c/o abd pain, and daughter is concerned   - AM amylase and Lipase all wnl , CT A/P 12/3 - no acute findings  - Continue Simethicone    PEG Leak  - 12/20 silver nitrate applied to PEG site by GI 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   - PEG Site appears macerated --> Multifactorial, possible the PEG has been too tight at home  - Peg loosened by GI at beside, no leakage or further intervention required   - recurrent RLQ abdominal pain and bleeding at the PEG site, resolved  - reevaluated by GI -- no bleeding at the PEG site  - 12/1: s/p abdominal ultrasound - limited study   - (12/3): Pt is c/o abd pain, and daughter is concerned, which resolved   - AM amylase and Lipase all wnl , CT A/P 12/3 - no acute findings  -- pt is tolerating tube feeds, + BM- -today   - Continue Simethicone    PEG Leak, resolved   - 12/20 silver nitrate applied to PEG site by GI

## 2024-01-05 NOTE — PROGRESS NOTE ADULT - PROBLEM SELECTOR PLAN 4
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  Was able to phonate with partial cuff deflation thus encouraged to be used when family present ot facilitate communication.  - Vent:  volume /12/40%/+5  - Tolerates intermittent PSV 15/5 during day/Vent HS  - Airway Clearance: Duoneb, Hypersal, Chest PT, PRN suctioning     Tracheal Bleeding (Intermittent)-->resolved since admission   - S/p CT Angio neck: No evidence of active bleeding around tracheostomy site. No hematoma.  No collection   - Seen by ENT; Car/vero and b/l bronchi visualized without any trauma, small amount of blood tinged secretions resting at beginning of right bronchus not actively bleeding.  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  Was able to phonate with partial cuff deflation thus encouraged to be used when family present ot facilitate communication.  -- no significant inline secretions   - Vent:  volume /12/40%/+5  - Tolerates intermittent PSV 15/5, 30 % during day/Vent HS; pulling only 150-240, and tachypnic, but not in any distress  - Airway Clearance: Duoneb, Hypersal, Chest PT, PRN suctioning     Tracheal Bleeding (Intermittent)-->resolved since admission   - S/p CT Angio neck: No evidence of active bleeding around tracheostomy site. No hematoma.  No collection   - Seen by ENT; Car/vero and b/l bronchi visualized without any trauma, small amount of blood tinged secretions resting at beginning of right bronchus not actively bleeding.  - 12/13 tracheal bleeding upon suctioning - tranexamic neb given  - 12/18 tranexamic neb x2 doses - bleeding resolved   - intermittent blood tinged secretions with suctioning likely 2/2 trauma from increased suctioning, resolved

## 2024-01-05 NOTE — PROGRESS NOTE ADULT - PROBLEM SELECTOR PLAN 1
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 aureus  - Sputum Cx 11/6: + PSA and Serratia, Cefepime 11/8 -->11/15  - 11/26 sputum culture MDR pseudomonas - clinically stable, defer treatment unless decompensates as per ID  - 11/26 urine culture - enterococcus - no need to treat - cfu low, organism not significant as per ID  - 12/19 respiked fever - blood cultures NGTD - received dose of vanco and cefepime - d/c'd as w/u negative Found w/ Bilateral PNA vs Atelectasis, and Possible OM Sacrum   - S/p Chest CT (10/28):  c/w Bilateral PNA vs Atelectasis   - s/p Vanco, Aztreonam   - Blood cx: 11/12 -- neg   - Sputum cx + Pseudomonas aeruginosa, S. aureus  - Sputum Cx 11/6: + PSA and Serratia, Cefepime 11/8 -->11/15  - 11/26 sputum culture MDR pseudomonas - clinically stable, defer treatment unless decompensates as per ID  - 11/26 urine culture - enterococcus - no need to treat - cfu low, organism not significant as per ID  - 12/19 respiked fever - blood cultures Neg (12/19) - received dose of vanco and cefepime - d/c'd as w/u negative  -- observe off Abx

## 2024-01-06 LAB
GLUCOSE BLDC GLUCOMTR-MCNC: 176 MG/DL — HIGH (ref 70–99)
GLUCOSE BLDC GLUCOMTR-MCNC: 176 MG/DL — HIGH (ref 70–99)
GLUCOSE BLDC GLUCOMTR-MCNC: 181 MG/DL — HIGH (ref 70–99)
GLUCOSE BLDC GLUCOMTR-MCNC: 198 MG/DL — HIGH (ref 70–99)
GLUCOSE BLDC GLUCOMTR-MCNC: 198 MG/DL — HIGH (ref 70–99)
MRSA PCR RESULT.: SIGNIFICANT CHANGE UP
MRSA PCR RESULT.: SIGNIFICANT CHANGE UP
S AUREUS DNA NOSE QL NAA+PROBE: SIGNIFICANT CHANGE UP
S AUREUS DNA NOSE QL NAA+PROBE: SIGNIFICANT CHANGE UP

## 2024-01-06 PROCEDURE — 99232 SBSQ HOSP IP/OBS MODERATE 35: CPT

## 2024-01-06 RX ORDER — IPRATROPIUM/ALBUTEROL SULFATE 18-103MCG
3 AEROSOL WITH ADAPTER (GRAM) INHALATION EVERY 8 HOURS
Refills: 0 | Status: DISCONTINUED | OUTPATIENT
Start: 2024-01-06 | End: 2024-01-17

## 2024-01-06 RX ADMIN — Medication 500 MILLIGRAM(S): at 12:41

## 2024-01-06 RX ADMIN — Medication 3 MILLILITER(S): at 05:28

## 2024-01-06 RX ADMIN — Medication 1: at 00:24

## 2024-01-06 RX ADMIN — ESCITALOPRAM OXALATE 10 MILLIGRAM(S): 10 TABLET, FILM COATED ORAL at 08:25

## 2024-01-06 RX ADMIN — INSULIN HUMAN 16 UNIT(S): 100 INJECTION, SOLUTION SUBCUTANEOUS at 12:42

## 2024-01-06 RX ADMIN — AMLODIPINE BESYLATE 10 MILLIGRAM(S): 2.5 TABLET ORAL at 05:59

## 2024-01-06 RX ADMIN — Medication 5 MILLIGRAM(S): at 21:59

## 2024-01-06 RX ADMIN — SIMETHICONE 80 MILLIGRAM(S): 80 TABLET, CHEWABLE ORAL at 17:37

## 2024-01-06 RX ADMIN — Medication 1 TABLET(S): at 17:37

## 2024-01-06 RX ADMIN — GABAPENTIN 100 MILLIGRAM(S): 400 CAPSULE ORAL at 21:59

## 2024-01-06 RX ADMIN — SIMETHICONE 80 MILLIGRAM(S): 80 TABLET, CHEWABLE ORAL at 12:41

## 2024-01-06 RX ADMIN — Medication 3 MILLILITER(S): at 21:55

## 2024-01-06 RX ADMIN — Medication 1 APPLICATION(S): at 06:01

## 2024-01-06 RX ADMIN — INSULIN HUMAN 34 UNIT(S): 100 INJECTION, SOLUTION SUBCUTANEOUS at 06:19

## 2024-01-06 RX ADMIN — QUETIAPINE FUMARATE 12.5 MILLIGRAM(S): 200 TABLET, FILM COATED ORAL at 17:38

## 2024-01-06 RX ADMIN — Medication 1 MILLIGRAM(S): at 21:59

## 2024-01-06 RX ADMIN — LIDOCAINE 5 MILLILITER(S): 4 CREAM TOPICAL at 17:39

## 2024-01-06 RX ADMIN — Medication 125 MICROGRAM(S): at 05:59

## 2024-01-06 RX ADMIN — Medication 1 TABLET(S): at 06:00

## 2024-01-06 RX ADMIN — LIDOCAINE 1 PATCH: 4 CREAM TOPICAL at 12:41

## 2024-01-06 RX ADMIN — Medication 2 DROP(S): at 17:39

## 2024-01-06 RX ADMIN — PHENYLEPHRINE-SHARK LIVER OIL-MINERAL OIL-PETROLATUM RECTAL OINTMENT 1 APPLICATION(S): at 12:42

## 2024-01-06 RX ADMIN — Medication 1000 MILLIGRAM(S): at 22:00

## 2024-01-06 RX ADMIN — NYSTATIN CREAM 1 APPLICATION(S): 100000 CREAM TOPICAL at 14:21

## 2024-01-06 RX ADMIN — CHLORHEXIDINE GLUCONATE 15 MILLILITER(S): 213 SOLUTION TOPICAL at 06:00

## 2024-01-06 RX ADMIN — Medication 1 APPLICATION(S): at 17:39

## 2024-01-06 RX ADMIN — CHLORHEXIDINE GLUCONATE 15 MILLILITER(S): 213 SOLUTION TOPICAL at 17:39

## 2024-01-06 RX ADMIN — NYSTATIN CREAM 1 APPLICATION(S): 100000 CREAM TOPICAL at 21:59

## 2024-01-06 RX ADMIN — Medication 2 DROP(S): at 12:41

## 2024-01-06 RX ADMIN — LIDOCAINE 5 MILLILITER(S): 4 CREAM TOPICAL at 05:59

## 2024-01-06 RX ADMIN — Medication 2 DROP(S): at 06:01

## 2024-01-06 RX ADMIN — CHLORHEXIDINE GLUCONATE 1 APPLICATION(S): 213 SOLUTION TOPICAL at 06:01

## 2024-01-06 RX ADMIN — INSULIN HUMAN 9 UNIT(S): 100 INJECTION, SOLUTION SUBCUTANEOUS at 17:41

## 2024-01-06 RX ADMIN — LIDOCAINE 1 PATCH: 4 CREAM TOPICAL at 20:08

## 2024-01-06 RX ADMIN — Medication 1 APPLICATION(S): at 12:41

## 2024-01-06 RX ADMIN — Medication 3 MILLILITER(S): at 14:57

## 2024-01-06 RX ADMIN — Medication 2 DROP(S): at 00:25

## 2024-01-06 RX ADMIN — Medication 15 MILLILITER(S): at 12:41

## 2024-01-06 RX ADMIN — NYSTATIN CREAM 1 APPLICATION(S): 100000 CREAM TOPICAL at 06:00

## 2024-01-06 RX ADMIN — Medication 1: at 12:43

## 2024-01-06 RX ADMIN — ZINC OXIDE 1 APPLICATION(S): 200 OINTMENT TOPICAL at 12:42

## 2024-01-06 RX ADMIN — LIDOCAINE 1 PATCH: 4 CREAM TOPICAL at 00:03

## 2024-01-06 RX ADMIN — LIDOCAINE 1 PATCH: 4 CREAM TOPICAL at 20:09

## 2024-01-06 RX ADMIN — LIDOCAINE 1 PATCH: 4 CREAM TOPICAL at 12:40

## 2024-01-06 RX ADMIN — Medication 1000 MILLIGRAM(S): at 23:00

## 2024-01-06 RX ADMIN — Medication 1: at 06:18

## 2024-01-06 RX ADMIN — PANTOPRAZOLE SODIUM 40 MILLIGRAM(S): 20 TABLET, DELAYED RELEASE ORAL at 08:25

## 2024-01-06 RX ADMIN — SIMETHICONE 80 MILLIGRAM(S): 80 TABLET, CHEWABLE ORAL at 00:24

## 2024-01-06 RX ADMIN — INSULIN GLARGINE 19 UNIT(S): 100 INJECTION, SOLUTION SUBCUTANEOUS at 08:26

## 2024-01-06 RX ADMIN — Medication 1 APPLICATION(S): at 00:24

## 2024-01-06 RX ADMIN — SIMETHICONE 80 MILLIGRAM(S): 80 TABLET, CHEWABLE ORAL at 06:00

## 2024-01-06 RX ADMIN — Medication 1: at 17:40

## 2024-01-06 RX ADMIN — Medication 5 MILLIGRAM(S): at 05:59

## 2024-01-06 NOTE — PROGRESS NOTE ADULT - PROBLEM SELECTOR PLAN 11
GOC: Long discussion of RCU team with daughter, pt remains FULL CODE  -- 1/5-- Dr Romna explained to pt's daughter re: complications with long tracheostomy dependance. Pt's daughter is concerned that pt has been failing PMV's trials. GOC: Long discussion of RCU team with daughter, pt remains FULL CODE  -- 1/5-- Dr Roman explained to pt's daughter re: complications with long tracheostomy dependance. Pt's daughter is concerned that pt has been failing PMV's trials.

## 2024-01-06 NOTE — PROGRESS NOTE ADULT - PROBLEM SELECTOR PLAN 6
- Echo from 10/17/2020 notable for hyperdynamic L ventricular systolic function, moderately severe LVOT obstruction, and EF 81% (per Wilkinson calculation) vs 77% (per Teichholz calculation)  - Amlodipine 10mg QD__ BP is well controlled

## 2024-01-06 NOTE — PROGRESS NOTE ADULT - SUBJECTIVE AND OBJECTIVE BOX
Patient is a 73y old  Female who presents with a chief complaint of PEG Bleeding (22 Dec 2023 07:35)      Interval Events:    REVIEW OF SYSTEMS:  [ ] Positive  [ ] All other systems negative  [ ] Unable to assess ROS because ________    Vital Signs Last 24 Hrs  T(C): 37.5 (01-06-24 @ 04:38), Max: 37.5 (01-06-24 @ 04:38)  T(F): 99.5 (01-06-24 @ 04:38), Max: 99.5 (01-06-24 @ 04:38)  HR: 92 (01-06-24 @ 05:28) (85 - 99)  BP: 121/52 (01-06-24 @ 04:38) (121/52 - 125/63)  RR: 18 (01-06-24 @ 04:38) (18 - 18)  SpO2: 98% (01-06-24 @ 05:28) (98% - 100%)    PHYSICAL EXAM:  HEENT:   [ ]Tracheostomy:  [ ]Pupils equal  [ ]No oral lesions  [ ]Abnormal    SKIN  [ ]No Rash  [ ] Abnormal  [ ] pressure    CARDIAC  [ ]Regular  [ ]Abnormal    PULMONARY  [ ]Bilateral Clear Breath Sounds  [ ]Normal Excursion  [ ]Abnormal    GI  [ ]PEG      [ ] +BS		              [ ]Soft, nondistended, nontender	  [ ]Abnormal    MUSCULOSKELETAL                                   [ ]Bedbound                 [ ]Abnormal    [ ]Ambulatory/OOB to chair                           EXTREMITIES                                         [ ]Normal  [ ]Edema                           NEUROLOGIC  [ ] Normal, non focal  [ ] Focal findings:    PSYCHIATRIC  [ ]Alert and appropriate  [ ] Sedated	 [ ]Agitated    :  Mcbride: [ ] Yes, if yes: Date of Placement:                   [  ] No    LINES: Central Lines [ ] Yes, if yes: Date of Placement                                     [  ] No    HOSPITAL MEDICATIONS:  MEDICATIONS  (STANDING):  albuterol/ipratropium for Nebulization 3 milliLiter(s) Nebulizer every 6 hours  amLODIPine   Tablet 10 milliGRAM(s) Oral daily  artificial  tears Solution 2 Drop(s) Both EYES four times a day  ascorbic acid 500 milliGRAM(s) Oral daily  calcium carbonate    500 mG (Tums) Chewable 1 Tablet(s) Chew every 12 hours  chlorhexidine 0.12% Liquid 15 milliLiter(s) Oral Mucosa every 12 hours  chlorhexidine 4% Liquid 1 Application(s) Topical <User Schedule>  dextrose 50% Injectable 12.5 Gram(s) IV Push once  dextrose 50% Injectable 25 Gram(s) IV Push once  escitalopram 10 milliGRAM(s) Oral <User Schedule>  fentaNYL   Patch  25 MICROgram(s)/Hr 1 Patch Transdermal every 72 hours  gabapentin Solution 100 milliGRAM(s) Enteral Tube at bedtime  glucagon  Injectable 1 milliGRAM(s) IntraMuscular once  hemorrhoidal Ointment 1 Application(s) Rectal daily  hemorrhoidal Ointment      insulin glargine Injectable (LANTUS) 19 Unit(s) SubCutaneous every morning  insulin lispro (ADMELOG) corrective regimen sliding scale   SubCutaneous every 6 hours  insulin regular  human recombinant 34 Unit(s) SubCutaneous <User Schedule>  insulin regular  human recombinant 9 Unit(s) SubCutaneous <User Schedule>  insulin regular  human recombinant 16 Unit(s) SubCutaneous <User Schedule>  levothyroxine 125 MICROGram(s) Oral <User Schedule>  lidocaine   4% Patch 1 Patch Transdermal daily  lidocaine   4% Patch 1 Patch Transdermal daily  lidocaine 2% Viscous 5 milliLiter(s) Mucosal two times a day  melatonin 5 milliGRAM(s) Oral at bedtime  melatonin 1 milliGRAM(s) Oral at bedtime  multivitamin/minerals/iron Oral Solution (CENTRUM) 15 milliLiter(s) Oral daily  nystatin Powder 1 Application(s) Topical every 8 hours  pantoprazole   Suspension 40 milliGRAM(s) Enteral Tube <User Schedule>  petrolatum Ophthalmic Ointment 1 Application(s) Both EYES four times a day  predniSONE   Tablet 5 milliGRAM(s) Oral daily  QUEtiapine 12.5 milliGRAM(s) Oral <User Schedule>  silver nitrate Applicator 1 Application(s) Topical once  simethicone 80 milliGRAM(s) Chew four times a day  zinc oxide 40% Paste 1 Application(s) Topical daily    MEDICATIONS  (PRN):  acetaminophen   Oral Liquid .. 1000 milliGRAM(s) Enteral Tube every 6 hours PRN Temp greater or equal to 38C (100.4F), Mild Pain (1 - 3)  benzocaine 20% Spray 1 Spray(s) Topical four times a day PRN Cough  diclofenac sodium 1% Gel 2 Gram(s) Topical four times a day PRN pain  diphenhydrAMINE Elixir 25 milliGRAM(s) Oral every 6 hours PRN Rash and/or Itching  guaifenesin/dextromethorphan Oral Liquid 10 milliLiter(s) Oral every 6 hours PRN Cough  sodium chloride 0.65% Nasal 1 Spray(s) Both Nostrils every 6 hours PRN Nasal Congestion      LABS:                          CAPILLARY BLOOD GLUCOSE    MICROBIOLOGY:     RADIOLOGY:  [ ] Reviewed and interpreted by me    Mode: AC/ CMV (Assist Control/ Continuous Mandatory Ventilation)  RR (machine): 12  TV (machine): 300  FiO2: 30  PEEP: 5  MAP: 8  PIP: 25   Patient is a 73y old  Female who presents with a chief complaint of PEG Bleeding (22 Dec 2023 07:35)      Interval Events: No events over night.    REVIEW OF SYSTEMS:  [ ] Positive  [ ] All other systems negative  [X] Unable to assess ROS because ________    Vital Signs Last 24 Hrs  T(C): 37.5 (01-06-24 @ 04:38), Max: 37.5 (01-06-24 @ 04:38)  T(F): 99.5 (01-06-24 @ 04:38), Max: 99.5 (01-06-24 @ 04:38)  HR: 92 (01-06-24 @ 05:28) (85 - 99)  BP: 121/52 (01-06-24 @ 04:38) (121/52 - 125/63)  RR: 18 (01-06-24 @ 04:38) (18 - 18)  SpO2: 98% (01-06-24 @ 05:28) (98% - 100%)      PHYSICAL EXAM:  HEENT:   [X]Tracheostomy: #8 distal XLT Shiley  [X]Pupils equal  [ ]No oral lesions  [X] Abnormal: missing dentition; +teeth caries; +loose bottom teeth    SKIN  [ ]No Rash  [ ] Abnormal  [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    CARDIAC  [X]Regular  [ ]Abnormal    PULMONARY  [ ]Bilateral Clear Breath Sounds  [ ]Normal Excursion  [X]Abnormal - BL Coarse BS    GI  [X]PEG      [X] +BS		              [X]Soft, nondistended, nontender	  [ ]Abnormal    MUSCULOSKELETAL                                   [X]Bedbound                 [ ]Abnormal    [ ]Ambulatory/OOB to chair                           EXTREMITIES                                         [X]Normal  [ ]Edema                           NEUROLOGIC  [ ] Normal, non focal  [X] Focal findings: opens eyes spontaneously, follows commands on all 4 extremities    PSYCHIATRIC  [X]Alert and appropriate  [ ] Sedated	 [ ]Agitated    :  Mcbride: [ ] Yes, if yes: Date of Placement:                   [X] No    LINES: Central Lines [ ] Yes, if yes: Date of Placement                                     [X] No      HOSPITAL MEDICATIONS:  MEDICATIONS  (STANDING):  albuterol/ipratropium for Nebulization 3 milliLiter(s) Nebulizer every 6 hours  amLODIPine   Tablet 10 milliGRAM(s) Oral daily  artificial  tears Solution 2 Drop(s) Both EYES four times a day  ascorbic acid 500 milliGRAM(s) Oral daily  calcium carbonate    500 mG (Tums) Chewable 1 Tablet(s) Chew every 12 hours  chlorhexidine 0.12% Liquid 15 milliLiter(s) Oral Mucosa every 12 hours  chlorhexidine 4% Liquid 1 Application(s) Topical <User Schedule>  dextrose 50% Injectable 12.5 Gram(s) IV Push once  dextrose 50% Injectable 25 Gram(s) IV Push once  escitalopram 10 milliGRAM(s) Oral <User Schedule>  fentaNYL   Patch  25 MICROgram(s)/Hr 1 Patch Transdermal every 72 hours  gabapentin Solution 100 milliGRAM(s) Enteral Tube at bedtime  glucagon  Injectable 1 milliGRAM(s) IntraMuscular once  hemorrhoidal Ointment 1 Application(s) Rectal daily  hemorrhoidal Ointment      insulin glargine Injectable (LANTUS) 19 Unit(s) SubCutaneous every morning  insulin lispro (ADMELOG) corrective regimen sliding scale   SubCutaneous every 6 hours  insulin regular  human recombinant 34 Unit(s) SubCutaneous <User Schedule>  insulin regular  human recombinant 9 Unit(s) SubCutaneous <User Schedule>  insulin regular  human recombinant 16 Unit(s) SubCutaneous <User Schedule>  levothyroxine 125 MICROGram(s) Oral <User Schedule>  lidocaine   4% Patch 1 Patch Transdermal daily  lidocaine   4% Patch 1 Patch Transdermal daily  lidocaine 2% Viscous 5 milliLiter(s) Mucosal two times a day  melatonin 5 milliGRAM(s) Oral at bedtime  melatonin 1 milliGRAM(s) Oral at bedtime  multivitamin/minerals/iron Oral Solution (CENTRUM) 15 milliLiter(s) Oral daily  nystatin Powder 1 Application(s) Topical every 8 hours  pantoprazole   Suspension 40 milliGRAM(s) Enteral Tube <User Schedule>  petrolatum Ophthalmic Ointment 1 Application(s) Both EYES four times a day  predniSONE   Tablet 5 milliGRAM(s) Oral daily  QUEtiapine 12.5 milliGRAM(s) Oral <User Schedule>  silver nitrate Applicator 1 Application(s) Topical once  simethicone 80 milliGRAM(s) Chew four times a day  zinc oxide 40% Paste 1 Application(s) Topical daily    MEDICATIONS  (PRN):  acetaminophen   Oral Liquid .. 1000 milliGRAM(s) Enteral Tube every 6 hours PRN Temp greater or equal to 38C (100.4F), Mild Pain (1 - 3)  benzocaine 20% Spray 1 Spray(s) Topical four times a day PRN Cough  diclofenac sodium 1% Gel 2 Gram(s) Topical four times a day PRN pain  diphenhydrAMINE Elixir 25 milliGRAM(s) Oral every 6 hours PRN Rash and/or Itching  guaifenesin/dextromethorphan Oral Liquid 10 milliLiter(s) Oral every 6 hours PRN Cough  sodium chloride 0.65% Nasal 1 Spray(s) Both Nostrils every 6 hours PRN Nasal Congestion      LABS:                          CAPILLARY BLOOD GLUCOSE    MICROBIOLOGY:     RADIOLOGY:  [ ] Reviewed and interpreted by me    Mode: AC/ CMV (Assist Control/ Continuous Mandatory Ventilation)  RR (machine): 12  TV (machine): 300  FiO2: 30  PEEP: 5  MAP: 8  PIP: 25

## 2024-01-06 NOTE — PROGRESS NOTE ADULT - PROBLEM SELECTOR PLAN 1
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 aureus  - Sputum Cx 11/6: + PSA and Serratia, Cefepime 11/8 -->11/15  - 11/26 sputum culture MDR pseudomonas - clinically stable, defer treatment unless decompensates as per ID  - 11/26 urine culture - enterococcus - no need to treat - cfu low, organism not significant as per ID  - 12/19 respiked fever - blood cultures Neg (12/19) - received dose of vanco and cefepime - d/c'd as w/u negative  -- observe off Abx

## 2024-01-06 NOTE — PROGRESS NOTE ADULT - PROBLEM SELECTOR PLAN 2
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   - S/p CT abd/pelvis (10/28): Sacral decubitus ulcer extending to the coccyx with osseous remodeling and pelvic MRI or bone scan to recc to exclude osteomyelitis.  - 10/30 wound care note: Sacral stage 4 pressure injury 4.5cm x 2.5cm x 1.5cm w/ lip of undermining circumferentially greatest 9-12 of 3cm.  - MRI pelvis 10/30 with Osteomyelitis, completed Cefepime for 7 days, Vancomycin d/marli   - 11/10: resumed Cipro 500mg q 12 and Keflex 500mg q 6 until 12/10.  - 11/20: Changed to IV Cipro 400 q12 as recommended by ID pharmacist due to multiple drug interactions when given via PEG  - 11/28 wound care note: Sacral stage 4 pressure injury 4.5cm x 2.5cm x 0.5cm, moist granular wound bed   palpable but not exposed bone no blistering, (+) serosanguinous drainage. No odor, erythema, increased warmth, tenderness, induration, fluctuance, nor crepitus.  - 12/3 wound culture ordered - d/c'd as no need for culture, wound improving, no signs of infection  - 12/10 completed cipro and keflex x5 week course  - wound improving, - wound care following

## 2024-01-06 NOTE — PROGRESS NOTE ADULT - ASSESSMENT
Chief complaint: Patient presents with:  Toenail: Trimming      History of Present Illness: This 81 year old NIDDM II male is seen for follow-up management of elongated toenails.  He says he has difficulty trimming his toenails and his family encourages him to see podiatry for care of his toenails.    He gets burning, tingling, and numbness in his feet every once in awhile. He does not have DM shoes and he previously declined them, but he is requesting DM shoes and CMOs today. He says he had CMOs made by [facility] in the past, but this was about twenty years ago. He still wears the CMOs but he thinks it is time for a new pair.    He says he has had painful calluses in the past, but he has not recently had pain from his calluses on his toes.     Last HbA1C was 5.3% on 08/05/2021.        No further pedal complaints today.         /72 (BP Location: Left arm, Patient Position: Sitting, Cuff Size: Adult Regular)   Pulse 89   Temp 97  F (36.1  C) (Tympanic)   SpO2 95%     Patient Active Problem List   Diagnosis     Urinary retention     Generalized weakness     Type 2 diabetes mellitus with stage 3 chronic kidney disease, without long-term current use of insulin (H)     Tachycardia     Hypoxia     Other acute pulmonary embolism with acute cor pulmonale (H)     Dyspnea, unspecified type     Benign prostatic hyperplasia with urinary retention     Chronic kidney disease, stage III (moderate) (H)     Chronic obstructive lung disease (H)     Chronic painful diabetic neuropathy (H)     Erectile dysfunction     Essential hypertension     Gout, unspecified     History of CVA (cerebrovascular accident)     Hyperlipidemia     IBS (irritable bowel syndrome)     Lumbar spondylosis     Mild concentric left ventricular hypertrophy (LVH)     Moderate episode of recurrent major depressive disorder (H)     Spinal stenosis of lumbar region without neurogenic claudication     Uncomplicated alcohol dependence (H)     Nursing  Home Visit     Onychomycosis of left great toe     Pincer nail deformity       Past Surgical History:   Procedure Laterality Date     CYSTOSCOPY, TRANSURETHRAL RESECTION (TUR) PROSTATE, COMBINED N/A 10/15/2021    Procedure: CYSTOSCOPY, WITH TRANSURETHRAL RESECTION PROSTATE;  Surgeon: Indu De Leon MD;  Location: [facility]     [facility] REPAIR         Current Outpatient Medications   Medication     acetaminophen (TYLENOL) 325 MG tablet     albuterol (PROAIR HFA/PROVENTIL HFA/VENTOLIN HFA) 108 (90 Base) MCG/ACT inhaler     allopurinol (ZYLOPRIM) 100 MG tablet     amLODIPine (NORVASC) 2.5 MG tablet     amoxicillin-clavulanate (AUGMENTIN) 875-125 MG tablet     atorvastatin (LIPITOR) 80 MG tablet     carvedilol (COREG) 25 MG tablet     finasteride (PROSCAR) 5 MG tablet     gabapentin (NEURONTIN) 300 MG capsule     glimepiride (AMARYL) 2 MG tablet     metFORMIN (GLUCOPHAGE-XR) 500 MG 24 hr tablet     multivitamin, therapeutic (THERA-VIT) TABS tablet     rivaroxaban ANTICOAGULANT (XARELTO) 20 MG TABS tablet     Semaglutide,0.25 or 0.5MG/DOS, (OZEMPIC, 0.25 OR 0.5 MG/DOSE,) 2 MG/1.5ML SOPN     tamsulosin (FLOMAX) 0.4 MG capsule     vitamin B-12 (CYANOCOBALAMIN) 250 MCG tablet     No current facility-administered medications for this visit.          Allergies   Allergen Reactions     Ace Inhibitors      Per [facility]: hyperkalemia.  Pt doesn't know if he is allergic     Ceclor [Cefaclor] Hives     Sulfa Drugs      Pt unsure.        History reviewed. No pertinent family history.    Social History     Socioeconomic History     Marital status: Single     Spouse name: None     Number of children: None     Years of education: None     Highest education level: None   Occupational History     None   Tobacco Use     Smoking status: Never Smoker     Smokeless tobacco: Former User     Types: Chew   Substance and Sexual Activity     Alcohol use: Not Currently     Drug use: Never     Sexual activity: None   Other Topics Concern      Parent/sibling w/ CABG, MI or angioplasty before 65F 55M? Not Asked   Social History Narrative     None     Social Determinants of Health     Financial Resource Strain:      Difficulty of Paying Living Expenses:    Food Insecurity:      Worried About Running Out of Food in the Last Year:      Ran Out of Food in the Last Year:    Transportation Needs:      Lack of Transportation (Medical):      Lack of Transportation (Non-Medical):    Physical Activity:      Days of Exercise per Week:      Minutes of Exercise per Session:    Stress:      Feeling of Stress :    Social Connections:      Frequency of Communication with Friends and Family:      Frequency of Social Gatherings with Friends and Family:      Attends Sabianist Services:      Active Member of Clubs or Organizations:      Attends Club or Organization Meetings:      Marital Status:    Intimate Partner Violence:      Fear of Current or Ex-Partner:      Emotionally Abused:      Physically Abused:      Sexually Abused:        ROS: 10 point ROS neg other than the symptoms noted above in the HPI.  EXAM  Constitutional: healthy, alert and no distress    Psychiatric: mentation appears normal and affect normal/bright    VASCULAR:  -Dorsalis pedis pulse +2/4 b/l  -Posterior tibial pulse +1/4 b/l  -Capillary refill time < 3 seconds to b/l hallux  -Hair growth absent to b/l anterior legs and ankles  NEURO:  -Light touch sensation severely diminished to b/l plantar forefoot  -Protective sensation diminished with SWM +0/10 RIGHT and +1/10 LEFT on 08/17/2021    DERM:  -Skin temperature within normal limits to bilateral foot  -Hyperkeratotic lesion to the bilateral lateral fifth digit IPJ -- no wounds post paring  -Hyperkeratotic lesion to the LEFT distal lateral fourth digit -- no wounds post paring  -Moderately dry skin with flaking of skin to bilateral plantar foot and mild rubor to bilateral plantar foot  -Toenails elongated, thickened, dystrophic and discolored x  10  ---Moderate incurvation of bilateral nail borders of the RIGHT hallux without a break in the skin, without an open wound, and without drainage  MSK:  -Muscle strength of ankles +5/5 for dorsiflexion, plantarflexion, ABDUction and ADDuction b/l    ============================================================    ASSESSMENT:  (L60.3) Onychodystrophy  (primary encounter diagnosis)    (L84) Callus of foot    (L85.3) Xerosis of skin    (E11.9) Diabetes mellitus type 2, noninsulin dependent (H)    (E11.42) Diabetic polyneuropathy associated with type 2 diabetes mellitus (H)    (M21.969,  R20.8) Loss of protective sensation of skin of deformed foot    (M47.816) Lumbar spondylosis      PLAN:  -Patient evaluated and examined. Treatment options discussed with no educational barriers noted.    -High risk toenail debridement x 10 toenails without incident    -Callus pared x 3 to the bilateral lateral 5th digit and the lateral LEFT fourth digit without incident  ---Patient reminded that the callus will likely return due to the underlying, prominent bone causing the callus while the patient is walking.    -DM shoes:  ---Patient declined these on 8/17/2021, but he is requesting this referral today for DM shoes and CMOs through [facility].  ---Referral placed on 10/25/2021 for DM shoes and for Dm shoes and CMOs    -Patient in agreement with the above treatment plan and all of patient's questions were answered.      RTC 63+ days for diabetic foot exam and high risk nail debridement and callus ang [place] DPM   74 y/o F with PMHx of T2DM, HTN, HLD, anemia, hypothyroidism, RA, fibromyalgia, remote cerebral aneurysm repair, acute hypercapnic respiratory failure now with tracheostomy, PEG tube, and bedbound (trach change 8/18, PEG change 8/14), sacral pressure ulcer, BIBEMS from home for 6 day hx of bleeding from the tracheostomy and PEG tube with associated abdominal pain, recent history of auditory and visual hallucinations, and with chronic sacral decubitus ulcer. Exam notable for mild abdominal distention with LLQ and RLQ tenderness, tracheostomy in place with no obvious bleeding, PEG tube with scant amount of dried blood, at baseline neurologic status. Imaging showing no active bleeding around tracheostomy site, however was notable for mild thickening along PEG tube tract, sacral ulcer extending to the coccyx suggestive of possible osteomyelitis, and bibasilar patchy lung opacities with volume loss. Patient admitted for respiratory support, wound care of tracheostomy and PEG, and management of sacral osteomyelitis. No further Trach and peg site bleeding noted since admission.  Pelvic MRI imaging also suggestive of Acute OM. Patient placed on long-term antibiotics with Infectious disease guidance.  Additionally treated with a 7d course of Cefepime due to PSA and Serratia tracheitis on 11/8-->11/15 and then resumed cipro/keflex.  Hospital course c/b Delirium for which Seroquel was added and home Lexapro continued. Tracheostomy changed to #9 Cuffed [place] by ENT 11/3.  Despite trach change patient remained with persistent air leak.  On 11/19, the trach was again changed due to leak and loss of volumes on vent.  Trach was changed to #8 distal cuffed Shiley.  Patient seen by Dermatology for back lesions.  One diagnosed as a seborrheic keratitis and the other a dermatofibroma.  Intermittent abdominal pain throughout hospital course, at times relieved with gas/BM, w/u negative, seen by GI - no recs.  12/10 pt completed cipro and keflex for osteo treatment.  12/13 moderate amounts of secretions w/ blood - tranexamic acid neb given with notable improvement.  12/18 with blood tinged secretion again - TXA 250mg neb x2 doses.  12/19 spiked fever to 102 - pancultured, negative w/u.  12/19 silver Nitrate applied to PEG site by GI for leakage.  Pt has since remained medically stable.  Receives continuous pulmonary toileting w/ duoneb, chest PT, and suctioning PRN.      1/4:  Pt remains medically stable.  Afebrile, hemodynamically stable.  Blood tinged secretions have improved and remain intermittent.   72 y/o F with PMHx of T2DM, HTN, HLD, anemia, hypothyroidism, RA, fibromyalgia, remote cerebral aneurysm repair, acute hypercapnic respiratory failure now with tracheostomy, PEG tube, and bedbound (trach change 8/18, PEG change 8/14), sacral pressure ulcer, BIBEMS from home for 6 day hx of bleeding from the tracheostomy and PEG tube with associated abdominal pain, recent history of auditory and visual hallucinations, and with chronic sacral decubitus ulcer. Exam notable for mild abdominal distention with LLQ and RLQ tenderness, tracheostomy in place with no obvious bleeding, PEG tube with scant amount of dried blood, at baseline neurologic status. Imaging showing no active bleeding around tracheostomy site, however was notable for mild thickening along PEG tube tract, sacral ulcer extending to the coccyx suggestive of possible osteomyelitis, and bibasilar patchy lung opacities with volume loss. Patient admitted for respiratory support, wound care of tracheostomy and PEG, and management of sacral osteomyelitis. No further Trach and peg site bleeding noted since admission.  Pelvic MRI imaging also suggestive of Acute OM. Patient placed on long-term antibiotics with Infectious disease guidance.  Additionally treated with a 7d course of Cefepime due to PSA and Serratia tracheitis on 11/8-->11/15 and then resumed cipro/keflex.  Hospital course c/b Delirium for which Seroquel was added and home Lexapro continued. Tracheostomy changed to #9 Cuffed Portex by ENT 11/3.  Despite trach change patient remained with persistent air leak.  On 11/19, the trach was again changed due to leak and loss of volumes on vent.  Trach was changed to #8 distal cuffed Shiley.  Patient seen by Dermatology for back lesions.  One diagnosed as a seborrheic keratitis and the other a dermatofibroma.  Intermittent abdominal pain throughout hospital course, at times relieved with gas/BM, w/u negative, seen by GI - no recs.  12/10 pt completed cipro and keflex for osteo treatment.  12/13 moderate amounts of secretions w/ blood - tranexamic acid neb given with notable improvement.  12/18 with blood tinged secretion again - TXA 250mg neb x2 doses.  12/19 spiked fever to 102 - pancultured, negative w/u.  12/19 silver Nitrate applied to PEG site by GI for leakage.  Pt has since remained medically stable.  Receives continuous pulmonary toileting w/ duoneb, chest PT, and suctioning PRN.      1/6:   No acute events.  Daughter raised concerns to forehead lump above left brow.  On my exam was very small solid and immobile and without superficial bruising observed, likely bony protrusion with alternative being small lipoma but no intervention warranted.  Can be re-assessed periodically for growth.  No drainage observed from PEG stoma this morning. 74 y/o F with PMHx of T2DM, HTN, HLD, anemia, hypothyroidism, RA, fibromyalgia, remote cerebral aneurysm repair, acute hypercapnic respiratory failure now with tracheostomy, PEG tube, and bedbound (trach change 8/18, PEG change 8/14), sacral pressure ulcer, BIBEMS from home for 6 day hx of bleeding from the tracheostomy and PEG tube with associated abdominal pain, recent history of auditory and visual hallucinations, and with chronic sacral decubitus ulcer. Exam notable for mild abdominal distention with LLQ and RLQ tenderness, tracheostomy in place with no obvious bleeding, PEG tube with scant amount of dried blood, at baseline neurologic status. Imaging showing no active bleeding around tracheostomy site, however was notable for mild thickening along PEG tube tract, sacral ulcer extending to the coccyx suggestive of possible osteomyelitis, and bibasilar patchy lung opacities with volume loss. Patient admitted for respiratory support, wound care of tracheostomy and PEG, and management of sacral osteomyelitis. No further Trach and peg site bleeding noted since admission.  Pelvic MRI imaging also suggestive of Acute OM. Patient placed on long-term antibiotics with Infectious disease guidance.  Additionally treated with a 7d course of Cefepime due to PSA and Serratia tracheitis on 11/8-->11/15 and then resumed cipro/keflex.  Hospital course c/b Delirium for which Seroquel was added and home Lexapro continued. Tracheostomy changed to #9 Cuffed Portex by ENT 11/3.  Despite trach change patient remained with persistent air leak.  On 11/19, the trach was again changed due to leak and loss of volumes on vent.  Trach was changed to #8 distal cuffed Shiley.  Patient seen by Dermatology for back lesions.  One diagnosed as a seborrheic keratitis and the other a dermatofibroma.  Intermittent abdominal pain throughout hospital course, at times relieved with gas/BM, w/u negative, seen by GI - no recs.  12/10 pt completed cipro and keflex for osteo treatment.  12/13 moderate amounts of secretions w/ blood - tranexamic acid neb given with notable improvement.  12/18 with blood tinged secretion again - TXA 250mg neb x2 doses.  12/19 spiked fever to 102 - pancultured, negative w/u.  12/19 silver Nitrate applied to PEG site by GI for leakage.  Pt has since remained medically stable.  Receives continuous pulmonary toileting w/ duoneb, chest PT, and suctioning PRN.      1/6:   No acute events.  Daughter raised concerns to forehead lump above left brow.  On my exam was very small solid and immobile and without superficial bruising observed, likely bony protrusion with alternative being small lipoma but no intervention warranted.  Can be re-assessed periodically for growth.  No drainage observed from PEG stoma this morning.

## 2024-01-06 NOTE — PROGRESS NOTE ADULT - PROBLEM SELECTOR PLAN 4
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  Was able to phonate with partial cuff deflation thus encouraged to be used when family present ot facilitate communication.  -- no significant inline secretions   - Vent:  volume /12/40%/+5  - Tolerates intermittent PSV 15/5, 30 % during day/Vent HS; pulling only 150-240, and tachypnic, but not in any distress  - Airway Clearance: Duoneb, Hypersal, Chest PT, PRN suctioning     Tracheal Bleeding (Intermittent)-->resolved since admission   - S/p CT Angio neck: No evidence of active bleeding around tracheostomy site. No hematoma.  No collection   - Seen by ENT; Car/vero and b/l bronchi visualized without any trauma, small amount of blood tinged secretions resting at beginning of right bronchus not actively bleeding.  - 12/13 tracheal bleeding upon suctioning - tranexamic neb given  - 12/18 tranexamic neb x2 doses - bleeding resolved   - intermittent blood tinged secretions with suctioning likely 2/2 trauma from increased suctioning, resolved

## 2024-01-06 NOTE — PROGRESS NOTE ADULT - PROBLEM SELECTOR PLAN 8
- Insulin regimen as per Endocrine team  - Endocrine recommendations appreciated.  -- - 1800 BG above goal continue with __ Regular insulin 34units sc at 6am, increase to 17 units sc at 12 noon and 9 units sc at 6pm.__ >177,

## 2024-01-06 NOTE — PROGRESS NOTE ADULT - PROBLEM SELECTOR PLAN 10
DVT PPx: Lovenox discontinued given daughter's concern for trach and PEG stoma bleeding/oozing.  Also with mild thrombocytopenia.  Will continue SCDs and no chemical DVT prophylaxis

## 2024-01-06 NOTE — PROGRESS NOTE ADULT - PROBLEM SELECTOR PLAN 5
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   - PEG Site appears macerated --> Multifactorial, possible the PEG has been too tight at home  - Peg loosened by GI at beside, no leakage or further intervention required   - recurrent RLQ abdominal pain and bleeding at the PEG site, resolved  - reevaluated by GI -- no bleeding at the PEG site  - 12/1: s/p abdominal ultrasound - limited study   - (12/3): Pt is c/o abd pain, and daughter is concerned, which resolved   - AM amylase and Lipase all wnl , CT A/P 12/3 - no acute findings  -- pt is tolerating tube feeds, + BM- -today   - Continue Simethicone    PEG Leak, resolved   - 12/20 silver nitrate applied to PEG site by GI

## 2024-01-06 NOTE — PROGRESS NOTE ADULT - NS ATTEND AMEND GEN_ALL_CORE FT
73F with a h/o DM, HTN, HLD, anemia, hypothyroidism, RA, fibromyalgia, remote cerebral aneurysm with repair, hypercapnic respiratory failure s/p tracheostomy/PEG, bedbound, sacral pressure ulcer. Admitted w/ bleeding from tracheostomy and PEG w/ associated abdominal pain, recent hx of auditory/visual hallucinations. Imaging showed mild thickening along PEG tube tract and sacral ulcer extending to coccyx suggestive of acute osteomyelitis.    Remains on chronic mech vent, tolerating PS trials during the day for periods of time but unable to fully wean and unfortunately she will continue to be ventilator dependent  Tolerating PEG feeds  Sacral OM s/p 6 week course of Cipro and Keflex as per ID - completed 12/10/23. Appreciate wound care follow up.   c/w current pain regimen - in setting of fibromyalgia and pain from wound  Medically stable for discharge- plan is for Wed 1/10 d/c home.    Agree with plan as outlined above.

## 2024-01-06 NOTE — PROGRESS NOTE ADULT - PROBLEM SELECTOR PLAN 9
Continue Home Lexapro 10mg daily   Psych consult appreciated   - Continue Seroquel 12.5mg daily (6PM)  - pt is calm and cooperative

## 2024-01-07 LAB
GLUCOSE BLDC GLUCOMTR-MCNC: 142 MG/DL — HIGH (ref 70–99)
GLUCOSE BLDC GLUCOMTR-MCNC: 142 MG/DL — HIGH (ref 70–99)
GLUCOSE BLDC GLUCOMTR-MCNC: 165 MG/DL — HIGH (ref 70–99)
GLUCOSE BLDC GLUCOMTR-MCNC: 165 MG/DL — HIGH (ref 70–99)
GLUCOSE BLDC GLUCOMTR-MCNC: 177 MG/DL — HIGH (ref 70–99)
GLUCOSE BLDC GLUCOMTR-MCNC: 177 MG/DL — HIGH (ref 70–99)
GLUCOSE BLDC GLUCOMTR-MCNC: 218 MG/DL — HIGH (ref 70–99)
GLUCOSE BLDC GLUCOMTR-MCNC: 218 MG/DL — HIGH (ref 70–99)
GLUCOSE BLDC GLUCOMTR-MCNC: 233 MG/DL — HIGH (ref 70–99)
GLUCOSE BLDC GLUCOMTR-MCNC: 233 MG/DL — HIGH (ref 70–99)
GLUCOSE BLDC GLUCOMTR-MCNC: 240 MG/DL — HIGH (ref 70–99)
GLUCOSE BLDC GLUCOMTR-MCNC: 240 MG/DL — HIGH (ref 70–99)

## 2024-01-07 PROCEDURE — 73090 X-RAY EXAM OF FOREARM: CPT | Mod: 26,LT

## 2024-01-07 PROCEDURE — 99233 SBSQ HOSP IP/OBS HIGH 50: CPT

## 2024-01-07 PROCEDURE — 73030 X-RAY EXAM OF SHOULDER: CPT | Mod: 26,50

## 2024-01-07 RX ORDER — OXYCODONE HYDROCHLORIDE 5 MG/1
2.5 TABLET ORAL ONCE
Refills: 0 | Status: DISCONTINUED | OUTPATIENT
Start: 2024-01-07 | End: 2024-01-07

## 2024-01-07 RX ORDER — OXYCODONE HYDROCHLORIDE 5 MG/1
2.5 TABLET ORAL EVERY 6 HOURS
Refills: 0 | Status: DISCONTINUED | OUTPATIENT
Start: 2024-01-07 | End: 2024-01-09

## 2024-01-07 RX ORDER — INSULIN HUMAN 100 [IU]/ML
INJECTION, SOLUTION SUBCUTANEOUS EVERY 6 HOURS
Refills: 0 | Status: DISCONTINUED | OUTPATIENT
Start: 2024-01-07 | End: 2024-01-10

## 2024-01-07 RX ORDER — INSULIN HUMAN 100 [IU]/ML
17 INJECTION, SOLUTION SUBCUTANEOUS
Refills: 0 | Status: DISCONTINUED | OUTPATIENT
Start: 2024-01-07 | End: 2024-01-11

## 2024-01-07 RX ADMIN — Medication 500 MILLIGRAM(S): at 12:05

## 2024-01-07 RX ADMIN — Medication 1 APPLICATION(S): at 23:43

## 2024-01-07 RX ADMIN — OXYCODONE HYDROCHLORIDE 2.5 MILLIGRAM(S): 5 TABLET ORAL at 17:57

## 2024-01-07 RX ADMIN — Medication 3 MILLILITER(S): at 22:29

## 2024-01-07 RX ADMIN — INSULIN HUMAN 17 UNIT(S): 100 INJECTION, SOLUTION SUBCUTANEOUS at 12:07

## 2024-01-07 RX ADMIN — Medication 3 MILLILITER(S): at 13:06

## 2024-01-07 RX ADMIN — Medication 125 MICROGRAM(S): at 06:19

## 2024-01-07 RX ADMIN — GABAPENTIN 100 MILLIGRAM(S): 400 CAPSULE ORAL at 21:45

## 2024-01-07 RX ADMIN — Medication 1 MILLIGRAM(S): at 21:45

## 2024-01-07 RX ADMIN — LIDOCAINE 1 PATCH: 4 CREAM TOPICAL at 12:04

## 2024-01-07 RX ADMIN — LIDOCAINE 1 PATCH: 4 CREAM TOPICAL at 00:38

## 2024-01-07 RX ADMIN — Medication 2 DROP(S): at 06:21

## 2024-01-07 RX ADMIN — Medication 2: at 00:25

## 2024-01-07 RX ADMIN — Medication 15 MILLILITER(S): at 12:05

## 2024-01-07 RX ADMIN — Medication 1 TABLET(S): at 06:21

## 2024-01-07 RX ADMIN — NYSTATIN CREAM 1 APPLICATION(S): 100000 CREAM TOPICAL at 21:45

## 2024-01-07 RX ADMIN — OXYCODONE HYDROCHLORIDE 2.5 MILLIGRAM(S): 5 TABLET ORAL at 08:56

## 2024-01-07 RX ADMIN — Medication 3 MILLILITER(S): at 05:56

## 2024-01-07 RX ADMIN — Medication 5 MILLIGRAM(S): at 06:21

## 2024-01-07 RX ADMIN — NYSTATIN CREAM 1 APPLICATION(S): 100000 CREAM TOPICAL at 17:34

## 2024-01-07 RX ADMIN — INSULIN HUMAN 34 UNIT(S): 100 INJECTION, SOLUTION SUBCUTANEOUS at 06:32

## 2024-01-07 RX ADMIN — Medication 2 DROP(S): at 00:26

## 2024-01-07 RX ADMIN — SIMETHICONE 80 MILLIGRAM(S): 80 TABLET, CHEWABLE ORAL at 06:20

## 2024-01-07 RX ADMIN — LIDOCAINE 1 PATCH: 4 CREAM TOPICAL at 19:36

## 2024-01-07 RX ADMIN — ESCITALOPRAM OXALATE 10 MILLIGRAM(S): 10 TABLET, FILM COATED ORAL at 08:57

## 2024-01-07 RX ADMIN — AMLODIPINE BESYLATE 10 MILLIGRAM(S): 2.5 TABLET ORAL at 06:21

## 2024-01-07 RX ADMIN — LIDOCAINE 5 MILLILITER(S): 4 CREAM TOPICAL at 06:22

## 2024-01-07 RX ADMIN — Medication 1 APPLICATION(S): at 06:20

## 2024-01-07 RX ADMIN — SIMETHICONE 80 MILLIGRAM(S): 80 TABLET, CHEWABLE ORAL at 17:59

## 2024-01-07 RX ADMIN — QUETIAPINE FUMARATE 12.5 MILLIGRAM(S): 200 TABLET, FILM COATED ORAL at 17:58

## 2024-01-07 RX ADMIN — Medication 1: at 06:32

## 2024-01-07 RX ADMIN — Medication 1 APPLICATION(S): at 00:26

## 2024-01-07 RX ADMIN — Medication 1 APPLICATION(S): at 12:06

## 2024-01-07 RX ADMIN — Medication 2 DROP(S): at 17:59

## 2024-01-07 RX ADMIN — SIMETHICONE 80 MILLIGRAM(S): 80 TABLET, CHEWABLE ORAL at 00:23

## 2024-01-07 RX ADMIN — NYSTATIN CREAM 1 APPLICATION(S): 100000 CREAM TOPICAL at 06:21

## 2024-01-07 RX ADMIN — ZINC OXIDE 1 APPLICATION(S): 200 OINTMENT TOPICAL at 12:05

## 2024-01-07 RX ADMIN — CHLORHEXIDINE GLUCONATE 15 MILLILITER(S): 213 SOLUTION TOPICAL at 17:59

## 2024-01-07 RX ADMIN — Medication 1 TABLET(S): at 17:58

## 2024-01-07 RX ADMIN — SIMETHICONE 80 MILLIGRAM(S): 80 TABLET, CHEWABLE ORAL at 23:42

## 2024-01-07 RX ADMIN — PANTOPRAZOLE SODIUM 40 MILLIGRAM(S): 20 TABLET, DELAYED RELEASE ORAL at 08:58

## 2024-01-07 RX ADMIN — Medication 2: at 12:06

## 2024-01-07 RX ADMIN — OXYCODONE HYDROCHLORIDE 2.5 MILLIGRAM(S): 5 TABLET ORAL at 09:26

## 2024-01-07 RX ADMIN — INSULIN GLARGINE 19 UNIT(S): 100 INJECTION, SOLUTION SUBCUTANEOUS at 08:58

## 2024-01-07 RX ADMIN — LIDOCAINE 5 MILLILITER(S): 4 CREAM TOPICAL at 17:58

## 2024-01-07 RX ADMIN — Medication 1 APPLICATION(S): at 17:59

## 2024-01-07 RX ADMIN — INSULIN HUMAN 9 UNIT(S): 100 INJECTION, SOLUTION SUBCUTANEOUS at 18:00

## 2024-01-07 RX ADMIN — Medication 5 MILLIGRAM(S): at 21:45

## 2024-01-07 RX ADMIN — Medication 2 DROP(S): at 12:05

## 2024-01-07 RX ADMIN — INSULIN HUMAN 1: 100 INJECTION, SOLUTION SUBCUTANEOUS at 18:00

## 2024-01-07 RX ADMIN — PHENYLEPHRINE-SHARK LIVER OIL-MINERAL OIL-PETROLATUM RECTAL OINTMENT 1 APPLICATION(S): at 12:05

## 2024-01-07 RX ADMIN — SIMETHICONE 80 MILLIGRAM(S): 80 TABLET, CHEWABLE ORAL at 12:04

## 2024-01-07 RX ADMIN — CHLORHEXIDINE GLUCONATE 1 APPLICATION(S): 213 SOLUTION TOPICAL at 06:22

## 2024-01-07 RX ADMIN — CHLORHEXIDINE GLUCONATE 15 MILLILITER(S): 213 SOLUTION TOPICAL at 06:21

## 2024-01-07 NOTE — PROGRESS NOTE ADULT - PROBLEM SELECTOR PLAN 1
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 aureus  - Sputum Cx 11/6: + PSA and Serratia, Cefepime 11/8 -->11/15  - 11/26 sputum culture MDR pseudomonas - clinically stable, defer treatment unless decompensates as per ID  - 11/26 urine culture - enterococcus - no need to treat - cfu low, organism not significant as per ID  - 12/19 respiked fever - blood cultures Neg (12/19) - received dose of vanco and cefepime - d/c'd as w/u negative  -- observe off Abx Found w/ Bilateral PNA vs Atelectasis, and Possible OM Sacrum   - S/p Chest CT (10/28):  c/w Bilateral PNA vs Atelectasis   - s/p Vanco, Aztreonam   - Blood cx: 11/12 -- neg   - Sputum cx + Pseudomonas aeruginosa, S. aureus  - Sputum Cx 11/6: + PSA and Serratia, Cefepime 11/8 -->11/15  - 11/26 sputum culture MDR pseudomonas - clinically stable, defer treatment unless decompensates as per ID  - 11/26 urine culture - enterococcus - no need to treat - cfu low, organism not significant as per ID  - 12/19 respiked fever - blood cultures Neg (12/19) - received dose of vanco and cefepime - d/c'd as w/u negative  - observe off Abx

## 2024-01-07 NOTE — PROGRESS NOTE ADULT - SUBJECTIVE AND OBJECTIVE BOX
Patient is a 73y old  Female who presents with a chief complaint of PEG Bleeding (22 Dec 2023 07:35)      Interval Events:    REVIEW OF SYSTEMS:  [ ] Positive  [ ] All other systems negative  [ ] Unable to assess ROS because ________    Vital Signs Last 24 Hrs  T(C): 36.4 (01-07-24 @ 06:31), Max: 36.4 (01-07-24 @ 06:31)  T(F): 97.6 (01-07-24 @ 06:31), Max: 97.6 (01-07-24 @ 06:31)  HR: 82 (01-07-24 @ 06:31) (75 - 104)  BP: 136/64 (01-07-24 @ 06:31) (104/57 - 136/64)  RR: 19 (01-07-24 @ 06:31) (19 - 21)  SpO2: 100% (01-07-24 @ 06:31) (97% - 100%)    PHYSICAL EXAM:  HEENT:   [ ]Tracheostomy:  [ ]Pupils equal  [ ]No oral lesions  [ ]Abnormal    SKIN  [ ]No Rash  [ ] Abnormal  [ ] pressure    CARDIAC  [ ]Regular  [ ]Abnormal    PULMONARY  [ ]Bilateral Clear Breath Sounds  [ ]Normal Excursion  [ ]Abnormal    GI  [ ]PEG      [ ] +BS		              [ ]Soft, nondistended, nontender	  [ ]Abnormal    MUSCULOSKELETAL                                   [ ]Bedbound                 [ ]Abnormal    [ ]Ambulatory/OOB to chair                           EXTREMITIES                                         [ ]Normal  [ ]Edema                           NEUROLOGIC  [ ] Normal, non focal  [ ] Focal findings:    PSYCHIATRIC  [ ]Alert and appropriate  [ ] Sedated	 [ ]Agitated    :  Mcbride: [ ] Yes, if yes: Date of Placement:                   [  ] No    LINES: Central Lines [ ] Yes, if yes: Date of Placement                                     [  ] No    HOSPITAL MEDICATIONS:  MEDICATIONS  (STANDING):  albuterol/ipratropium for Nebulization 3 milliLiter(s) Nebulizer every 8 hours  amLODIPine   Tablet 10 milliGRAM(s) Oral daily  artificial  tears Solution 2 Drop(s) Both EYES four times a day  ascorbic acid 500 milliGRAM(s) Oral daily  calcium carbonate    500 mG (Tums) Chewable 1 Tablet(s) Chew every 12 hours  chlorhexidine 0.12% Liquid 15 milliLiter(s) Oral Mucosa every 12 hours  chlorhexidine 4% Liquid 1 Application(s) Topical <User Schedule>  dextrose 50% Injectable 12.5 Gram(s) IV Push once  dextrose 50% Injectable 25 Gram(s) IV Push once  escitalopram 10 milliGRAM(s) Oral <User Schedule>  fentaNYL   Patch  25 MICROgram(s)/Hr 1 Patch Transdermal every 72 hours  gabapentin Solution 100 milliGRAM(s) Enteral Tube at bedtime  glucagon  Injectable 1 milliGRAM(s) IntraMuscular once  hemorrhoidal Ointment 1 Application(s) Rectal daily  hemorrhoidal Ointment      insulin glargine Injectable (LANTUS) 19 Unit(s) SubCutaneous every morning  insulin lispro (ADMELOG) corrective regimen sliding scale   SubCutaneous every 6 hours  insulin regular  human recombinant 34 Unit(s) SubCutaneous <User Schedule>  insulin regular  human recombinant 9 Unit(s) SubCutaneous <User Schedule>  insulin regular  human recombinant 16 Unit(s) SubCutaneous <User Schedule>  levothyroxine 125 MICROGram(s) Oral <User Schedule>  lidocaine   4% Patch 1 Patch Transdermal daily  lidocaine   4% Patch 1 Patch Transdermal daily  lidocaine 2% Viscous 5 milliLiter(s) Mucosal two times a day  melatonin 1 milliGRAM(s) Oral at bedtime  melatonin 5 milliGRAM(s) Oral at bedtime  multivitamin/minerals/iron Oral Solution (CENTRUM) 15 milliLiter(s) Oral daily  nystatin Powder 1 Application(s) Topical every 8 hours  pantoprazole   Suspension 40 milliGRAM(s) Enteral Tube <User Schedule>  petrolatum Ophthalmic Ointment 1 Application(s) Both EYES four times a day  predniSONE   Tablet 5 milliGRAM(s) Oral daily  QUEtiapine 12.5 milliGRAM(s) Oral <User Schedule>  silver nitrate Applicator 1 Application(s) Topical once  simethicone 80 milliGRAM(s) Chew four times a day  zinc oxide 40% Paste 1 Application(s) Topical daily    MEDICATIONS  (PRN):  acetaminophen   Oral Liquid .. 1000 milliGRAM(s) Enteral Tube every 6 hours PRN Temp greater or equal to 38C (100.4F), Mild Pain (1 - 3)  benzocaine 20% Spray 1 Spray(s) Topical four times a day PRN Cough  diclofenac sodium 1% Gel 2 Gram(s) Topical four times a day PRN pain  diphenhydrAMINE Elixir 25 milliGRAM(s) Oral every 6 hours PRN Rash and/or Itching  guaifenesin/dextromethorphan Oral Liquid 10 milliLiter(s) Oral every 6 hours PRN Cough  sodium chloride 0.65% Nasal 1 Spray(s) Both Nostrils every 6 hours PRN Nasal Congestion      LABS:                        8.4    6.80  )-----------( 141      ( 05 Jan 2024 16:20 )             27.8     01-05    136  |  97  |  19  ----------------------------<  138<H>  3.8   |  29  |  <0.30<L>    Ca    9.9      05 Jan 2024 16:20  Mg     1.7     01-05        Urinalysis Basic - ( 05 Jan 2024 16:20 )    Color: x / Appearance: x / SG: x / pH: x  Gluc: 138 mg/dL / Ketone: x  / Bili: x / Urobili: x   Blood: x / Protein: x / Nitrite: x   Leuk Esterase: x / RBC: x / WBC x   Sq Epi: x / Non Sq Epi: x / Bacteria: x          CAPILLARY BLOOD GLUCOSE    MICROBIOLOGY:     RADIOLOGY:  [ ] Reviewed and interpreted by me    Mode: AC/ CMV (Assist Control/ Continuous Mandatory Ventilation)  RR (machine): 12  TV (machine): 300  FiO2: 30  PEEP: 5  ITime: 1  MAP: 9  PIP: 25   Patient is a 73y old  Female who presents with a chief complaint of PEG Bleeding (22 Dec 2023 07:35)      Interval Events:  No acute events overnight.     REVIEW OF SYSTEMS:  [ ] Positive  [X] All other systems negative  [ ] Unable to assess ROS because ________    Vital Signs Last 24 Hrs  T(C): 36.4 (01-07-24 @ 06:31), Max: 36.4 (01-07-24 @ 06:31)  T(F): 97.6 (01-07-24 @ 06:31), Max: 97.6 (01-07-24 @ 06:31)  HR: 82 (01-07-24 @ 06:31) (75 - 104)  BP: 136/64 (01-07-24 @ 06:31) (104/57 - 136/64)  RR: 19 (01-07-24 @ 06:31) (19 - 21)  SpO2: 100% (01-07-24 @ 06:31) (97% - 100%)    PHYSICAL EXAM:  HEENT:   [X]Tracheostomy: #8 distal XLT [place]iley  [X]Pupils equal  [ ]No oral lesions  [X] Abnormal: missing dentition; +teeth caries; +loose bottom teeth    SKIN  [ ]No Rash  [ ] Abnormal  [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    CARDIAC  [X]Regular  [ ]Abnormal    PULMONARY  [ ]Bilateral Clear Breath Sounds  [ ]Normal Excursion  [X]Abnormal - BL Coarse BS    GI  [X]PEG      [X] +BS		              [X]Soft, nondistended, nontender	  [ ]Abnormal    MUSCULOSKELETAL                                   [X]Bedbound                 [ ]Abnormal    [ ]Ambulatory/OOB to chair                           EXTREMITIES                                         [X]Normal  [ ]Edema                           NEUROLOGIC  [ ] Normal, non focal  [X] Focal findings: opens eyes spontaneously, follows commands on all 4 extremities, able to mouth some words/express her needs at times    PSYCHIATRIC  [X]Alert and appropriate  [ ] Sedated	 [ ]Agitated    :  Mcbride: [ ] Yes, if yes: Date of Placement:                   [X] No    LINES: Central Lines [ ] Yes, if yes: Date of Placement                                     [X] No    HOSPITAL MEDICATIONS:  MEDICATIONS  (STANDING):  albuterol/ipratropium for Nebulization 3 milliLiter(s) Nebulizer every 8 hours  amLODIPine   Tablet 10 milliGRAM(s) Oral daily  artificial  tears Solution 2 Drop(s) Both EYES four times a day  ascorbic acid 500 milliGRAM(s) Oral daily  calcium carbonate    500 mG (Tums) Chewable 1 Tablet(s) Chew every 12 hours  chlorhexidine 0.12% Liquid 15 milliLiter(s) Oral Mucosa every 12 hours  chlorhexidine 4% Liquid 1 Application(s) Topical <User Schedule>  dextrose 50% Injectable 12.5 Gram(s) IV Push once  dextrose 50% Injectable 25 Gram(s) IV Push once  escitalopram 10 milliGRAM(s) Oral <User Schedule>  fentaNYL   Patch  25 MICROgram(s)/Hr 1 Patch Transdermal every 72 hours  gabapentin Solution 100 milliGRAM(s) Enteral Tube at bedtime  glucagon  Injectable 1 milliGRAM(s) IntraMuscular once  hemorrhoidal Ointment 1 Application(s) Rectal daily  hemorrhoidal Ointment      insulin glargine Injectable (LANTUS) 19 Unit(s) SubCutaneous every morning  insulin lispro (ADMELOG) corrective regimen sliding scale   SubCutaneous every 6 hours  insulin regular  human recombinant 34 Unit(s) SubCutaneous <User Schedule>  insulin regular  human recombinant 9 Unit(s) SubCutaneous <User Schedule>  insulin regular  human recombinant 16 Unit(s) SubCutaneous <User Schedule>  levothyroxine 125 MICROGram(s) Oral <User Schedule>  lidocaine   4% Patch 1 Patch Transdermal daily  lidocaine   4% Patch 1 Patch Transdermal daily  lidocaine 2% Viscous 5 milliLiter(s) Mucosal two times a day  melatonin 1 milliGRAM(s) Oral at bedtime  melatonin 5 milliGRAM(s) Oral at bedtime  multivitamin/minerals/iron Oral Solution (CENTRUM) 15 milliLiter(s) Oral daily  nystatin Powder 1 Application(s) Topical every 8 hours  pantoprazole   Suspension 40 milliGRAM(s) Enteral Tube <User Schedule>  petrolatum Ophthalmic Ointment 1 Application(s) Both EYES four times a day  predniSONE   Tablet 5 milliGRAM(s) Oral daily  QUEtiapine 12.5 milliGRAM(s) Oral <User Schedule>  silver nitrate Applicator 1 Application(s) Topical once  simethicone 80 milliGRAM(s) Chew four times a day  zinc oxide 40% Paste 1 Application(s) Topical daily    MEDICATIONS  (PRN):  acetaminophen   Oral Liquid .. 1000 milliGRAM(s) Enteral Tube every 6 hours PRN Temp greater or equal to 38C (100.4F), Mild Pain (1 - 3)  benzocaine 20% Spray 1 Spray(s) Topical four times a day PRN Cough  diclofenac sodium 1% Gel 2 Gram(s) Topical four times a day PRN pain  diphenhydrAMINE Elixir 25 milliGRAM(s) Oral every 6 hours PRN Rash and/or Itching  guaifenesin/dextromethorphan Oral Liquid 10 milliLiter(s) Oral every 6 hours PRN Cough  sodium chloride 0.65% Nasal 1 Spray(s) Both Nostrils every 6 hours PRN Nasal Congestion      LABS:                        8.4    6.80  )-----------( 141      ( 05 Jan 2024 16:20 )             27.8     01-05    136  |  97  |  19  ----------------------------<  138<H>  3.8   |  29  |  <0.30<L>    Ca    9.9      05 Jan 2024 16:20  Mg     1.7     01-05        Urinalysis Basic - ( 05 Jan 2024 16:20 )    Color: x / Appearance: x / SG: x / pH: x  Gluc: 138 mg/dL / Ketone: x  / Bili: x / Urobili: x   Blood: x / Protein: x / Nitrite: x   Leuk Esterase: x / RBC: x / WBC x   Sq Epi: x / Non Sq Epi: x / Bacteria: x          CAPILLARY BLOOD GLUCOSE    MICROBIOLOGY:     RADIOLOGY:  [ ] Reviewed and interpreted by me    Mode: AC/ CMV (Assist Control/ Continuous Mandatory Ventilation)  RR (machine): 12  TV (machine): 300  FiO2: 30  PEEP: 5  ITime: 1  MAP: 9  PIP: 25

## 2024-01-07 NOTE — PROGRESS NOTE ADULT - PROBLEM SELECTOR PLAN 5
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   - PEG Site appears macerated --> Multifactorial, possible the PEG has been too tight at home  - Peg loosened by GI at beside, no leakage or further intervention required   - recurrent RLQ abdominal pain and bleeding at the PEG site, resolved  - reevaluated by GI -- no bleeding at the PEG site  - 12/1: s/p abdominal ultrasound - limited study   - (12/3): Pt is c/o abd pain, and daughter is concerned, which resolved   - AM amylase and Lipase all wnl , CT A/P 12/3 - no acute findings  -- pt is tolerating tube feeds, + BM- -today   - Continue Simethicone    PEG Leak, resolved   - 12/20 silver nitrate applied to PEG site by GI 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   - PEG Site appears macerated --> Multifactorial, possible the PEG has been too tight at home  - Peg loosened by GI at beside, no leakage or further intervention required   - recurrent RLQ abdominal pain and bleeding at the PEG site, resolved  - reevaluated by GI -- no bleeding at the PEG site  - 12/1: s/p abdominal ultrasound - limited study   - (12/3): Pt is c/o abd pain, and daughter is concerned, which resolved   - AM amylase and Lipase all wnl , CT A/P 12/3 - no acute findings  -- pt is tolerating tube feeds, + BMs   - Continue Simethicone    PEG Leak, resolved   - 12/20 silver nitrate applied to PEG site by GI

## 2024-01-07 NOTE — PROGRESS NOTE ADULT - ASSESSMENT
74 y/o F with PMHx of T2DM, HTN, HLD, anemia, hypothyroidism, RA, fibromyalgia, remote cerebral aneurysm repair, acute hypercapnic respiratory failure now with tracheostomy, PEG tube, and bedbound (trach change 8/18, PEG change 8/14), sacral pressure ulcer, BIBEMS from home for 6 day hx of bleeding from the tracheostomy and PEG tube with associated abdominal pain, recent history of auditory and visual hallucinations, and with chronic sacral decubitus ulcer. Exam notable for mild abdominal distention with LLQ and RLQ tenderness, tracheostomy in place with no obvious bleeding, PEG tube with scant amount of dried blood, at baseline neurologic status. Imaging showing no active bleeding around tracheostomy site, however was notable for mild thickening along PEG tube tract, sacral ulcer extending to the coccyx suggestive of possible osteomyelitis, and bibasilar patchy lung opacities with volume loss. Patient admitted for respiratory support, wound care of tracheostomy and PEG, and management of sacral osteomyelitis. No further Trach and peg site bleeding noted since admission.  Pelvic MRI imaging also suggestive of Acute OM. Patient placed on long-term antibiotics with Infectious disease guidance.  Additionally treated with a 7d course of Cefepime due to PSA and Serratia tracheitis on 11/8-->11/15 and then resumed cipro/keflex.  Hospital course c/b Delirium for which Seroquel was added and home Lexapro continued. Tracheostomy changed to #9 Cuffed [place] by ENT 11/3.  Despite trach change patient remained with persistent air leak.  On 11/19, the trach was again changed due to leak and loss of volumes on vent.  Trach was changed to #8 distal cuffed [place].  Patient seen by Dermatology for back lesions.  One diagnosed as a seborrheic keratitis and the other a dermatofibroma.  Intermittent abdominal pain throughout hospital course, at times relieved with gas/BM, w/u negative, seen by GI - no recs.  12/10 pt completed cipro and keflex for osteo treatment.  12/13 moderate amounts of secretions w/ blood - tranexamic acid neb given with notable improvement.  12/18 with blood tinged secretion again - TXA 250mg neb x2 doses.  12/19 spiked fever to 102 - pancultured, negative w/u.  12/19 silver Nitrate applied to PEG site by GI for leakage.  Pt has since remained medically stable.  Receives continuous pulmonary toileting w/ duoneb, chest PT, and suctioning PRN.      1/6:   No acute events.  Daughter raised concerns to forehead lump above left brow.  On my exam was very small solid and immobile and without superficial bruising observed, likely bony protrusion with alternative being small lipoma but no intervention warranted.  Can be re-assessed periodically for growth.  No drainage observed from PEG stoma this morning. 72 y/o F with PMHx of T2DM, HTN, HLD, anemia, hypothyroidism, RA, fibromyalgia, remote cerebral aneurysm repair, acute hypercapnic respiratory failure now with tracheostomy, PEG tube, and bedbound (trach change 8/18, PEG change 8/14), sacral pressure ulcer, BIBEMS from home for 6 day hx of bleeding from the tracheostomy and PEG tube with associated abdominal pain, recent history of auditory and visual hallucinations, and with chronic sacral decubitus ulcer. Exam notable for mild abdominal distention with LLQ and RLQ tenderness, tracheostomy in place with no obvious bleeding, PEG tube with scant amount of dried blood, at baseline neurologic status. Imaging showing no active bleeding around tracheostomy site, however was notable for mild thickening along PEG tube tract, sacral ulcer extending to the coccyx suggestive of possible osteomyelitis, and bibasilar patchy lung opacities with volume loss. Patient admitted for respiratory support, wound care of tracheostomy and PEG, and management of sacral osteomyelitis. No further Trach and peg site bleeding noted since admission.  Pelvic MRI imaging also suggestive of Acute OM. Patient placed on long-term antibiotics with Infectious disease guidance.  Additionally treated with a 7d course of Cefepime due to PSA and Serratia tracheitis on 11/8-->11/15 and then resumed cipro/keflex.  Hospital course c/b Delirium for which Seroquel was added and home Lexapro continued. Tracheostomy changed to #9 Cuffed [place] by ENT 11/3.  Despite trach change patient remained with persistent air leak.  On 11/19, the trach was again changed due to leak and loss of volumes on vent.  Trach was changed to #8 distal cuffed [place].  Patient seen by Dermatology for back lesions.  One diagnosed as a seborrheic keratitis and the other a dermatofibroma.  Intermittent abdominal pain throughout hospital course, at times relieved with gas/BM, w/u negative, seen by GI - no recs.  12/10 pt completed cipro and keflex for osteo treatment.  12/13 moderate amounts of secretions w/ blood - tranexamic acid neb given with notable improvement.  12/18 with blood tinged secretion again - TXA 250mg neb x2 doses.  12/19 spiked fever to 102 - pancultured, negative w/u.  12/19 silver Nitrate applied to PEG site by GI for leakage.  Pt has since remained medically stable.  Receives continuous pulmonary toileting w/ duoneb, chest PT, and suctioning PRN.      1/6:   No acute events.  Daughter raised concerns to forehead lump above left brow.  On my exam was very small solid and immobile and without superficial bruising observed, likely bony protrusion with alternative being small lipoma but no intervention warranted.  Can be re-assessed periodically for growth.  No drainage observed from PEG stoma this morning. 74 y/o F with PMHx of T2DM, HTN, HLD, anemia, hypothyroidism, RA, fibromyalgia, remote cerebral aneurysm repair, acute hypercapnic respiratory failure now with tracheostomy, PEG tube, and bedbound (trach change 8/18, PEG change 8/14), sacral pressure ulcer, BIBEMS from home for 6 day hx of bleeding from the tracheostomy and PEG tube with associated abdominal pain, recent history of auditory and visual hallucinations, and with chronic sacral decubitus ulcer. Exam notable for mild abdominal distention with LLQ and RLQ tenderness, tracheostomy in place with no obvious bleeding, PEG tube with scant amount of dried blood, at baseline neurologic status. Imaging showing no active bleeding around tracheostomy site, however was notable for mild thickening along PEG tube tract, sacral ulcer extending to the coccyx suggestive of possible osteomyelitis, and bibasilar patchy lung opacities with volume loss. Patient admitted for respiratory support, wound care of tracheostomy and PEG, and management of sacral osteomyelitis. No further Trach and peg site bleeding noted since admission.  Pelvic MRI imaging also suggestive of Acute OM. Patient placed on long-term antibiotics with Infectious disease guidance.  Additionally treated with a 7d course of Cefepime due to PSA and Serratia tracheitis on 11/8-->11/15 and then resumed cipro/keflex.  Hospital course c/b Delirium for which Seroquel was added and home Lexapro continued. Tracheostomy changed to #9 Cuffed [place] by ENT 11/3.  Despite trach change patient remained with persistent air leak.  On 11/19, the trach was again changed due to leak and loss of volumes on vent.  Trach was changed to #8 distal cuffed [place].  Patient seen by Dermatology for back lesions.  One diagnosed as a seborrheic keratitis and the other a dermatofibroma.  Intermittent abdominal pain throughout hospital course, at times relieved with gas/BM, w/u negative, seen by GI - no recs.  12/10 pt completed cipro and keflex for osteo treatment.  12/13 moderate amounts of secretions w/ blood - tranexamic acid neb given with notable improvement.  12/18 with blood tinged secretion again - TXA 250mg neb x2 doses.  12/19 spiked fever to 102 - pancultured, negative w/u.  12/19 silver Nitrate applied to PEG site by GI for leakage.  Pt has since remained medically stable.  Receives continuous pulmonary toileting w/ duoneb, chest PT, and suctioning PRN.      1/7:  Pt remains afebrile.  Complaining of right arm pain.  On exam pt grimacing with touch, unable to lift arm up.  Good radial pulse, no swelling, no redness noted.  RUE midline, flushed, pt tolerated flush with no indications of pain (no facial grimacing).  Also complaining of BL shoulder pain - heat packs applied.  Pain meds given. 72 y/o F with PMHx of T2DM, HTN, HLD, anemia, hypothyroidism, RA, fibromyalgia, remote cerebral aneurysm repair, acute hypercapnic respiratory failure now with tracheostomy, PEG tube, and bedbound (trach change 8/18, PEG change 8/14), sacral pressure ulcer, BIBEMS from home for 6 day hx of bleeding from the tracheostomy and PEG tube with associated abdominal pain, recent history of auditory and visual hallucinations, and with chronic sacral decubitus ulcer. Exam notable for mild abdominal distention with LLQ and RLQ tenderness, tracheostomy in place with no obvious bleeding, PEG tube with scant amount of dried blood, at baseline neurologic status. Imaging showing no active bleeding around tracheostomy site, however was notable for mild thickening along PEG tube tract, sacral ulcer extending to the coccyx suggestive of possible osteomyelitis, and bibasilar patchy lung opacities with volume loss. Patient admitted for respiratory support, wound care of tracheostomy and PEG, and management of sacral osteomyelitis. No further Trach and peg site bleeding noted since admission.  Pelvic MRI imaging also suggestive of Acute OM. Patient placed on long-term antibiotics with Infectious disease guidance.  Additionally treated with a 7d course of Cefepime due to PSA and Serratia tracheitis on 11/8-->11/15 and then resumed cipro/keflex.  Hospital course c/b Delirium for which Seroquel was added and home Lexapro continued. Tracheostomy changed to #9 Cuffed [place] by ENT 11/3.  Despite trach change patient remained with persistent air leak.  On 11/19, the trach was again changed due to leak and loss of volumes on vent.  Trach was changed to #8 distal cuffed [place].  Patient seen by Dermatology for back lesions.  One diagnosed as a seborrheic keratitis and the other a dermatofibroma.  Intermittent abdominal pain throughout hospital course, at times relieved with gas/BM, w/u negative, seen by GI - no recs.  12/10 pt completed cipro and keflex for osteo treatment.  12/13 moderate amounts of secretions w/ blood - tranexamic acid neb given with notable improvement.  12/18 with blood tinged secretion again - TXA 250mg neb x2 doses.  12/19 spiked fever to 102 - pancultured, negative w/u.  12/19 silver Nitrate applied to PEG site by GI for leakage.  Pt has since remained medically stable.  Receives continuous pulmonary toileting w/ duoneb, chest PT, and suctioning PRN.      1/7:  Pt remains afebrile.  Complaining of right arm pain.  On exam pt grimacing with touch, unable to lift arm up.  Good radial pulse, no swelling, no redness noted.  RUE midline, flushed, pt tolerated flush with no indications of pain (no facial grimacing).  Also complaining of BL shoulder pain - heat packs applied.  Pain meds given. 72 y/o F with PMHx of T2DM, HTN, HLD, anemia, hypothyroidism, RA, fibromyalgia, remote cerebral aneurysm repair, acute hypercapnic respiratory failure now with tracheostomy, PEG tube, and bedbound (trach change 8/18, PEG change 8/14), sacral pressure ulcer, BIBEMS from home for 6 day hx of bleeding from the tracheostomy and PEG tube with associated abdominal pain, recent history of auditory and visual hallucinations, and with chronic sacral decubitus ulcer. Exam notable for mild abdominal distention with LLQ and RLQ tenderness, tracheostomy in place with no obvious bleeding, PEG tube with scant amount of dried blood, at baseline neurologic status. Imaging showing no active bleeding around tracheostomy site, however was notable for mild thickening along PEG tube tract, sacral ulcer extending to the coccyx suggestive of possible osteomyelitis, and bibasilar patchy lung opacities with volume loss. Patient admitted for respiratory support, wound care of tracheostomy and PEG, and management of sacral osteomyelitis. No further Trach and peg site bleeding noted since admission.  Pelvic MRI imaging also suggestive of Acute OM. Patient placed on long-term antibiotics with Infectious disease guidance.  Additionally treated with a 7d course of Cefepime due to PSA and Serratia tracheitis on 11/8-->11/15 and then resumed cipro/keflex.  Hospital course c/b Delirium for which Seroquel was added and home Lexapro continued. Tracheostomy changed to #9 Cuffed [place] by ENT 11/3.  Despite trach change patient remained with persistent air leak.  On 11/19, the trach was again changed due to leak and loss of volumes on vent.  Trach was changed to #8 distal cuffed [place].  Patient seen by Dermatology for back lesions.  One diagnosed as a seborrheic keratitis and the other a dermatofibroma.  Intermittent abdominal pain throughout hospital course, at times relieved with gas/BM, w/u negative, seen by GI - no recs.  12/10 pt completed cipro and keflex for osteo treatment.  12/13 moderate amounts of secretions w/ blood - tranexamic acid neb given with notable improvement.  12/18 with blood tinged secretion again - TXA 250mg neb x2 doses.  12/19 spiked fever to 102 - pancultured, negative w/u.  12/19 silver Nitrate applied to PEG site by GI for leakage.  Pt has since remained medically stable.  Receives continuous pulmonary toileting w/ duoneb, chest PT, and suctioning PRN.      1/7:  Pt remains afebrile.  Complaining of right arm pain.  On exam pt grimacing with touch, unable to lift arm up.  Good radial pulse, no swelling, no redness noted.  RUE midline, flushed, pt tolerated flush with no indications of pain (no facial grimacing).  Also complaining of BL shoulder pain - heat packs applied.  Pain meds given.  Xray performed.

## 2024-01-07 NOTE — PROGRESS NOTE ADULT - PROBLEM SELECTOR PLAN 6
- Echo from 10/17/2020 notable for hyperdynamic L ventricular systolic function, moderately severe LVOT obstruction, and EF 81% (per Wilkinson calculation) vs 77% (per Teichholz calculation)  - Amlodipine 10mg QD__ BP is well controlled - Echo from 10/17/2020 notable for hyperdynamic L ventricular systolic function, moderately severe LVOT obstruction, and EF 81% (per Wilkinson calculation) vs 77% (per Teichholz calculation)  - Amlodipine 10mg QD - BP is well controlled

## 2024-01-07 NOTE — PROGRESS NOTE ADULT - PROBLEM SELECTOR PLAN 2
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   - S/p CT abd/pelvis (10/28): Sacral decubitus ulcer extending to the coccyx with osseous remodeling and pelvic MRI or bone scan to recc to exclude osteomyelitis.  - 10/30 wound care note: Sacral stage 4 pressure injury 4.5cm x 2.5cm x 1.5cm w/ lip of undermining circumferentially greatest 9-12 of 3cm.  - MRI pelvis 10/30 with Osteomyelitis, completed Cefepime for 7 days, Vancomycin d/marli   - 11/10: resumed Cipro 500mg q 12 and Keflex 500mg q 6 until 12/10.  - 11/20: Changed to IV Cipro 400 q12 as recommended by ID pharmacist due to multiple drug interactions when given via PEG  - 11/28 wound care note: Sacral stage 4 pressure injury 4.5cm x 2.5cm x 0.5cm, moist granular wound bed   palpable but not exposed bone no blistering, (+) serosanguinous drainage. No odor, erythema, increased warmth, tenderness, induration, fluctuance, nor crepitus.  - 12/3 wound culture ordered - d/c'd as no need for culture, wound improving, no signs of infection  - 12/10 completed cipro and keflex x5 week course  - wound improving, - wound care following 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   - S/p CT abd/pelvis (10/28): Sacral decubitus ulcer extending to the coccyx with osseous remodeling and pelvic MRI or bone scan to recc to exclude osteomyelitis.  - 10/30 wound care note: Sacral stage 4 pressure injury 4.5cm x 2.5cm x 1.5cm w/ lip of undermining circumferentially greatest 9-12 of 3cm.  - MRI pelvis 10/30 with Osteomyelitis, completed Cefepime for 7 days, Vancomycin d/marli   - 11/10: resumed Cipro 500mg q 12 and Keflex 500mg q 6 until 12/10.  - 11/20: Changed to IV Cipro 400 q12 as recommended by ID pharmacist due to multiple drug interactions when given via PEG  - 11/28 wound care note: Sacral stage 4 pressure injury 4.5cm x 2.5cm x 0.5cm, moist granular wound bed   palpable but not exposed bone no blistering, (+) serosanguinous drainage. No odor, erythema, increased warmth, tenderness, induration, fluctuance, nor crepitus.  - 12/3 wound culture ordered - d/c'd as no need for culture, wound improving, no signs of infection  - 12/10 completed cipro and keflex x5 week course  - wound improving, wound care following

## 2024-01-07 NOTE — PROGRESS NOTE ADULT - PROBLEM SELECTOR PLAN 8
- Insulin regimen as per Endocrine team  - Endocrine recommendations appreciated.  -- - 1800 BG above goal continue with __ Regular insulin 34units sc at 6am, increase to 17 units sc at 12 noon and 9 units sc at 6pm.__ >177, - Insulin regimen as per Endocrine team  - Endocrine recommendations appreciated

## 2024-01-07 NOTE — PROGRESS NOTE ADULT - PROBLEM SELECTOR PLAN 11
GOC: Long discussion of RCU team with daughter, pt remains FULL CODE  -- 1/5-- Dr Roman explained to pt's daughter re: complications with long tracheostomy dependance. Pt's daughter is concerned that pt has been failing PMV's trials.

## 2024-01-07 NOTE — CHART NOTE - NSCHARTNOTEFT_GEN_A_CORE
Endocrinology following Ms. Woods for T2DM on tube feeds. BG intermittently above goal.    Recommend  - change low admelog correction scale q6h to humlin R regular insulin low scale q6h  - continue lantus 19 units daily in the morning  - continue 34 units regular insulin at 6am  - continue 17 units regular insulin at 12pm  - continue 9 units regular insulin at 6pm  - hold standing regular humulin R insulin if tube feeds held  - fingersticks q6h  - hypoglycemia protocol prn  - tube feeds per nutrition  - do not stop steroids abruptly as at risk for AI  - continue levothyroxine 125mcg daily    Endocrinology will continue to follow.    Tommy Falk MD  Endocrinology Fellow

## 2024-01-07 NOTE — PROGRESS NOTE ADULT - NS ATTEND AMEND GEN_ALL_CORE FT
73F with a h/o DM, HTN, HLD, anemia, hypothyroidism, RA, fibromyalgia, remote cerebral aneurysm with repair, hypercapnic respiratory failure s/p tracheostomy/PEG, bedbound, sacral pressure ulcer. Admitted w/ bleeding from tracheostomy and PEG w/ associated abdominal pain, recent hx of auditory/visual hallucinations. Imaging showed mild thickening along PEG tube tract and sacral ulcer extending to coccyx suggestive of acute osteomyelitis.    Remains on chronic mech vent, tolerating PS trials during the day for periods of time but unable to fully wean and unfortunately she will continue to be ventilator dependent  Tolerating PEG feeds  Sacral OM s/p 6 week course of Cipro and Keflex as per ID - completed 12/10/23. Appreciate wound care follow up.   c/w current pain regimen - in setting of fibromyalgia and pain from wound.  Today daughter reports that patient is having bilateral shoulder pain and right arm pain. She received oxycodone.  Will monitor. Patient has history of fibromyalgia.   Agree with plan as outlined above.

## 2024-01-08 LAB
GLUCOSE BLDC GLUCOMTR-MCNC: 128 MG/DL — HIGH (ref 70–99)
GLUCOSE BLDC GLUCOMTR-MCNC: 128 MG/DL — HIGH (ref 70–99)
GLUCOSE BLDC GLUCOMTR-MCNC: 167 MG/DL — HIGH (ref 70–99)
GLUCOSE BLDC GLUCOMTR-MCNC: 167 MG/DL — HIGH (ref 70–99)
GLUCOSE BLDC GLUCOMTR-MCNC: 178 MG/DL — HIGH (ref 70–99)
GLUCOSE BLDC GLUCOMTR-MCNC: 178 MG/DL — HIGH (ref 70–99)
GLUCOSE BLDC GLUCOMTR-MCNC: 184 MG/DL — HIGH (ref 70–99)
GLUCOSE BLDC GLUCOMTR-MCNC: 184 MG/DL — HIGH (ref 70–99)
GLUCOSE BLDC GLUCOMTR-MCNC: 229 MG/DL — HIGH (ref 70–99)
GLUCOSE BLDC GLUCOMTR-MCNC: 229 MG/DL — HIGH (ref 70–99)

## 2024-01-08 PROCEDURE — 93971 EXTREMITY STUDY: CPT | Mod: 26,RT

## 2024-01-08 PROCEDURE — 99232 SBSQ HOSP IP/OBS MODERATE 35: CPT

## 2024-01-08 RX ORDER — ACETAMINOPHEN 500 MG
1000 TABLET ORAL ONCE
Refills: 0 | Status: COMPLETED | OUTPATIENT
Start: 2024-01-08 | End: 2024-01-08

## 2024-01-08 RX ADMIN — OXYCODONE HYDROCHLORIDE 2.5 MILLIGRAM(S): 5 TABLET ORAL at 12:19

## 2024-01-08 RX ADMIN — Medication 5 MILLIGRAM(S): at 21:42

## 2024-01-08 RX ADMIN — Medication 1 APPLICATION(S): at 17:24

## 2024-01-08 RX ADMIN — Medication 15 MILLILITER(S): at 11:55

## 2024-01-08 RX ADMIN — LIDOCAINE 1 PATCH: 4 CREAM TOPICAL at 11:56

## 2024-01-08 RX ADMIN — Medication 1000 MILLIGRAM(S): at 09:07

## 2024-01-08 RX ADMIN — Medication 2 DROP(S): at 17:23

## 2024-01-08 RX ADMIN — CHLORHEXIDINE GLUCONATE 1 APPLICATION(S): 213 SOLUTION TOPICAL at 05:34

## 2024-01-08 RX ADMIN — INSULIN HUMAN 17 UNIT(S): 100 INJECTION, SOLUTION SUBCUTANEOUS at 12:00

## 2024-01-08 RX ADMIN — INSULIN HUMAN 9 UNIT(S): 100 INJECTION, SOLUTION SUBCUTANEOUS at 17:21

## 2024-01-08 RX ADMIN — LIDOCAINE 1 PATCH: 4 CREAM TOPICAL at 19:00

## 2024-01-08 RX ADMIN — Medication 2 DROP(S): at 11:59

## 2024-01-08 RX ADMIN — LIDOCAINE 1 PATCH: 4 CREAM TOPICAL at 00:00

## 2024-01-08 RX ADMIN — Medication 1 APPLICATION(S): at 05:33

## 2024-01-08 RX ADMIN — OXYCODONE HYDROCHLORIDE 2.5 MILLIGRAM(S): 5 TABLET ORAL at 13:19

## 2024-01-08 RX ADMIN — Medication 1 APPLICATION(S): at 11:59

## 2024-01-08 RX ADMIN — Medication 5 MILLIGRAM(S): at 05:32

## 2024-01-08 RX ADMIN — NYSTATIN CREAM 1 APPLICATION(S): 100000 CREAM TOPICAL at 13:35

## 2024-01-08 RX ADMIN — Medication 1 MILLIGRAM(S): at 21:42

## 2024-01-08 RX ADMIN — Medication 2 DROP(S): at 05:33

## 2024-01-08 RX ADMIN — SIMETHICONE 80 MILLIGRAM(S): 80 TABLET, CHEWABLE ORAL at 23:51

## 2024-01-08 RX ADMIN — Medication 500 MILLIGRAM(S): at 11:55

## 2024-01-08 RX ADMIN — INSULIN GLARGINE 19 UNIT(S): 100 INJECTION, SOLUTION SUBCUTANEOUS at 08:26

## 2024-01-08 RX ADMIN — AMLODIPINE BESYLATE 10 MILLIGRAM(S): 2.5 TABLET ORAL at 05:32

## 2024-01-08 RX ADMIN — Medication 1 APPLICATION(S): at 23:52

## 2024-01-08 RX ADMIN — Medication 2 DROP(S): at 23:52

## 2024-01-08 RX ADMIN — Medication 3 MILLILITER(S): at 06:19

## 2024-01-08 RX ADMIN — INSULIN HUMAN 1: 100 INJECTION, SOLUTION SUBCUTANEOUS at 23:52

## 2024-01-08 RX ADMIN — ZINC OXIDE 1 APPLICATION(S): 200 OINTMENT TOPICAL at 11:59

## 2024-01-08 RX ADMIN — INSULIN HUMAN 1: 100 INJECTION, SOLUTION SUBCUTANEOUS at 17:20

## 2024-01-08 RX ADMIN — INSULIN HUMAN 1: 100 INJECTION, SOLUTION SUBCUTANEOUS at 06:13

## 2024-01-08 RX ADMIN — PHENYLEPHRINE-SHARK LIVER OIL-MINERAL OIL-PETROLATUM RECTAL OINTMENT 1 APPLICATION(S): at 11:58

## 2024-01-08 RX ADMIN — CHLORHEXIDINE GLUCONATE 15 MILLILITER(S): 213 SOLUTION TOPICAL at 05:34

## 2024-01-08 RX ADMIN — SIMETHICONE 80 MILLIGRAM(S): 80 TABLET, CHEWABLE ORAL at 11:55

## 2024-01-08 RX ADMIN — LIDOCAINE 1 PATCH: 4 CREAM TOPICAL at 23:35

## 2024-01-08 RX ADMIN — INSULIN HUMAN 2: 100 INJECTION, SOLUTION SUBCUTANEOUS at 12:01

## 2024-01-08 RX ADMIN — ESCITALOPRAM OXALATE 10 MILLIGRAM(S): 10 TABLET, FILM COATED ORAL at 08:26

## 2024-01-08 RX ADMIN — Medication 3 MILLILITER(S): at 21:34

## 2024-01-08 RX ADMIN — LIDOCAINE 5 MILLILITER(S): 4 CREAM TOPICAL at 05:33

## 2024-01-08 RX ADMIN — NYSTATIN CREAM 1 APPLICATION(S): 100000 CREAM TOPICAL at 05:33

## 2024-01-08 RX ADMIN — SIMETHICONE 80 MILLIGRAM(S): 80 TABLET, CHEWABLE ORAL at 17:22

## 2024-01-08 RX ADMIN — LIDOCAINE 5 MILLILITER(S): 4 CREAM TOPICAL at 17:24

## 2024-01-08 RX ADMIN — Medication 1000 MILLIGRAM(S): at 16:30

## 2024-01-08 RX ADMIN — QUETIAPINE FUMARATE 12.5 MILLIGRAM(S): 200 TABLET, FILM COATED ORAL at 17:22

## 2024-01-08 RX ADMIN — LIDOCAINE 1 PATCH: 4 CREAM TOPICAL at 23:34

## 2024-01-08 RX ADMIN — OXYCODONE HYDROCHLORIDE 2.5 MILLIGRAM(S): 5 TABLET ORAL at 03:40

## 2024-01-08 RX ADMIN — Medication 1000 MILLIGRAM(S): at 08:37

## 2024-01-08 RX ADMIN — Medication 1 TABLET(S): at 17:23

## 2024-01-08 RX ADMIN — SIMETHICONE 80 MILLIGRAM(S): 80 TABLET, CHEWABLE ORAL at 05:32

## 2024-01-08 RX ADMIN — NYSTATIN CREAM 1 APPLICATION(S): 100000 CREAM TOPICAL at 21:43

## 2024-01-08 RX ADMIN — Medication 125 MICROGRAM(S): at 05:32

## 2024-01-08 RX ADMIN — Medication 400 MILLIGRAM(S): at 15:30

## 2024-01-08 RX ADMIN — Medication 3 MILLILITER(S): at 13:57

## 2024-01-08 RX ADMIN — INSULIN HUMAN 34 UNIT(S): 100 INJECTION, SOLUTION SUBCUTANEOUS at 06:10

## 2024-01-08 RX ADMIN — CHLORHEXIDINE GLUCONATE 15 MILLILITER(S): 213 SOLUTION TOPICAL at 17:23

## 2024-01-08 RX ADMIN — Medication 1 TABLET(S): at 05:32

## 2024-01-08 RX ADMIN — PANTOPRAZOLE SODIUM 40 MILLIGRAM(S): 20 TABLET, DELAYED RELEASE ORAL at 08:26

## 2024-01-08 RX ADMIN — GABAPENTIN 100 MILLIGRAM(S): 400 CAPSULE ORAL at 21:42

## 2024-01-08 RX ADMIN — OXYCODONE HYDROCHLORIDE 2.5 MILLIGRAM(S): 5 TABLET ORAL at 03:04

## 2024-01-08 NOTE — PROGRESS NOTE ADULT - PROBLEM SELECTOR PLAN 11
GOC: Long discussion of RCU team with daughter, pt remains FULL CODE  -- 1/5-- Dr Roman explained to pt's daughter re: complications with long tracheostomy dependance. Pt's daughter is concerned that pt has been failing PMV's trials. GOC: Long discussion of RCU team with daughter, pt remains FULL CODE  - 1/5 - Dr Roman explained to pt's daughter re: complications with long tracheostomy dependance. Pt's daughter is concerned that pt has been failing PMV's trials.

## 2024-01-08 NOTE — PROGRESS NOTE ADULT - PROBLEM SELECTOR PLAN 2
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   - S/p CT abd/pelvis (10/28): Sacral decubitus ulcer extending to the coccyx with osseous remodeling and pelvic MRI or bone scan to recc to exclude osteomyelitis.  - 10/30 wound care note: Sacral stage 4 pressure injury 4.5cm x 2.5cm x 1.5cm w/ lip of undermining circumferentially greatest 9-12 of 3cm.  - MRI pelvis 10/30 with Osteomyelitis, completed Cefepime for 7 days, Vancomycin d/marli   - 11/10: resumed Cipro 500mg q 12 and Keflex 500mg q 6 until 12/10.  - 11/20: Changed to IV Cipro 400 q12 as recommended by ID pharmacist due to multiple drug interactions when given via PEG  - 11/28 wound care note: Sacral stage 4 pressure injury 4.5cm x 2.5cm x 0.5cm, moist granular wound bed   palpable but not exposed bone no blistering, (+) serosanguinous drainage. No odor, erythema, increased warmth, tenderness, induration, fluctuance, nor crepitus.  - 12/3 wound culture ordered - d/c'd as no need for culture, wound improving, no signs of infection  - 12/10 completed cipro and keflex x5 week course  - wound improving, wound care following

## 2024-01-08 NOTE — PROGRESS NOTE ADULT - ASSESSMENT
72 y/o F with PMHx of T2DM, HTN, HLD, anemia, hypothyroidism, RA, fibromyalgia, remote cerebral aneurysm repair, acute hypercapnic respiratory failure now with tracheostomy, PEG tube, and bedbound (trach change 8/18, PEG change 8/14), sacral pressure ulcer, BIBEMS from home for 6 day hx of bleeding from the tracheostomy and PEG tube with associated abdominal pain, recent history of auditory and visual hallucinations, and with chronic sacral decubitus ulcer. Exam notable for mild abdominal distention with LLQ and RLQ tenderness, tracheostomy in place with no obvious bleeding, PEG tube with scant amount of dried blood, at baseline neurologic status. Imaging showing no active bleeding around tracheostomy site, however was notable for mild thickening along PEG tube tract, sacral ulcer extending to the coccyx suggestive of possible osteomyelitis, and bibasilar patchy lung opacities with volume loss. Patient admitted for respiratory support, wound care of tracheostomy and PEG, and management of sacral osteomyelitis. No further Trach and peg site bleeding noted since admission.  Pelvic MRI imaging also suggestive of Acute OM. Patient placed on long-term antibiotics with Infectious disease guidance.  Additionally treated with a 7d course of Cefepime due to PSA and Serratia tracheitis on 11/8-->11/15 and then resumed cipro/keflex.  Hospital course c/b Delirium for which Seroquel was added and home Lexapro continued. Tracheostomy changed to #9 Cuffed [place] by ENT 11/3.  Despite trach change patient remained with persistent air leak.  On 11/19, the trach was again changed due to leak and loss of volumes on vent.  Trach was changed to #8 distal cuffed [place].  Patient seen by Dermatology for back lesions.  One diagnosed as a seborrheic keratitis and the other a dermatofibroma.  Intermittent abdominal pain throughout hospital course, at times relieved with gas/BM, w/u negative, seen by GI - no recs.  12/10 pt completed cipro and keflex for osteo treatment.  12/13 moderate amounts of secretions w/ blood - tranexamic acid neb given with notable improvement.  12/18 with blood tinged secretion again - TXA 250mg neb x2 doses.  12/19 spiked fever to 102 - pancultured, negative w/u.  12/19 silver Nitrate applied to PEG site by GI for leakage.  Pt has since remained medically stable.  Receives continuous pulmonary toileting w/ duoneb, chest PT, and suctioning PRN.      1/7:  Pt remains afebrile.  Complaining of right arm pain.  On exam pt grimacing with touch, unable to lift arm up.  Good radial pulse, no swelling, no redness noted.  RUE midline, flushed, pt tolerated flush with no indications of pain (no facial grimacing).  Also complaining of BL shoulder pain - heat packs applied.  Pain meds given.  Xray performed.  72 y/o F with PMHx of T2DM, HTN, HLD, anemia, hypothyroidism, RA, fibromyalgia, remote cerebral aneurysm repair, acute hypercapnic respiratory failure now with tracheostomy, PEG tube, and bedbound (trach change 8/18, PEG change 8/14), sacral pressure ulcer, BIBEMS from home for 6 day hx of bleeding from the tracheostomy and PEG tube with associated abdominal pain, recent history of auditory and visual hallucinations, and with chronic sacral decubitus ulcer. Exam notable for mild abdominal distention with LLQ and RLQ tenderness, tracheostomy in place with no obvious bleeding, PEG tube with scant amount of dried blood, at baseline neurologic status. Imaging showing no active bleeding around tracheostomy site, however was notable for mild thickening along PEG tube tract, sacral ulcer extending to the coccyx suggestive of possible osteomyelitis, and bibasilar patchy lung opacities with volume loss. Patient admitted for respiratory support, wound care of tracheostomy and PEG, and management of sacral osteomyelitis. No further Trach and peg site bleeding noted since admission.  Pelvic MRI imaging also suggestive of Acute OM. Patient placed on long-term antibiotics with Infectious disease guidance.  Additionally treated with a 7d course of Cefepime due to PSA and Serratia tracheitis on 11/8-->11/15 and then resumed cipro/keflex.  Hospital course c/b Delirium for which Seroquel was added and home Lexapro continued. Tracheostomy changed to #9 Cuffed [place] by ENT 11/3.  Despite trach change patient remained with persistent air leak.  On 11/19, the trach was again changed due to leak and loss of volumes on vent.  Trach was changed to #8 distal cuffed [place].  Patient seen by Dermatology for back lesions.  One diagnosed as a seborrheic keratitis and the other a dermatofibroma.  Intermittent abdominal pain throughout hospital course, at times relieved with gas/BM, w/u negative, seen by GI - no recs.  12/10 pt completed cipro and keflex for osteo treatment.  12/13 moderate amounts of secretions w/ blood - tranexamic acid neb given with notable improvement.  12/18 with blood tinged secretion again - TXA 250mg neb x2 doses.  12/19 spiked fever to 102 - pancultured, negative w/u.  12/19 silver Nitrate applied to PEG site by GI for leakage.  Pt has since remained medically stable.  Receives continuous pulmonary toileting w/ duoneb, chest PT, and suctioning PRN.      1/8:  Pt still complaining of generalized pain.  Xray read still pending.  Right arm doppler negative for DVT.  Continuing with pain management with pain medications as ordered.   74 y/o F with PMHx of T2DM, HTN, HLD, anemia, hypothyroidism, RA, fibromyalgia, remote cerebral aneurysm repair, acute hypercapnic respiratory failure now with tracheostomy, PEG tube, and bedbound (trach change 8/18, PEG change 8/14), sacral pressure ulcer, BIBEMS from home for 6 day hx of bleeding from the tracheostomy and PEG tube with associated abdominal pain, recent history of auditory and visual hallucinations, and with chronic sacral decubitus ulcer. Exam notable for mild abdominal distention with LLQ and RLQ tenderness, tracheostomy in place with no obvious bleeding, PEG tube with scant amount of dried blood, at baseline neurologic status. Imaging showing no active bleeding around tracheostomy site, however was notable for mild thickening along PEG tube tract, sacral ulcer extending to the coccyx suggestive of possible osteomyelitis, and bibasilar patchy lung opacities with volume loss. Patient admitted for respiratory support, wound care of tracheostomy and PEG, and management of sacral osteomyelitis. No further Trach and peg site bleeding noted since admission.  Pelvic MRI imaging also suggestive of Acute OM. Patient placed on long-term antibiotics with Infectious disease guidance.  Additionally treated with a 7d course of Cefepime due to PSA and Serratia tracheitis on 11/8-->11/15 and then resumed cipro/keflex.  Hospital course c/b Delirium for which Seroquel was added and home Lexapro continued. Tracheostomy changed to #9 Cuffed [place] by ENT 11/3.  Despite trach change patient remained with persistent air leak.  On 11/19, the trach was again changed due to leak and loss of volumes on vent.  Trach was changed to #8 distal cuffed [place].  Patient seen by Dermatology for back lesions.  One diagnosed as a seborrheic keratitis and the other a dermatofibroma.  Intermittent abdominal pain throughout hospital course, at times relieved with gas/BM, w/u negative, seen by GI - no recs.  12/10 pt completed cipro and keflex for osteo treatment.  12/13 moderate amounts of secretions w/ blood - tranexamic acid neb given with notable improvement.  12/18 with blood tinged secretion again - TXA 250mg neb x2 doses.  12/19 spiked fever to 102 - pancultured, negative w/u.  12/19 silver Nitrate applied to PEG site by GI for leakage.  Pt has since remained medically stable.  Receives continuous pulmonary toileting w/ duoneb, chest PT, and suctioning PRN.      1/8:  Pt still complaining of generalized pain.  right forearm and BL shoulder Xrays - official read negative for acute fractures, dislocations.  Right arm doppler negative for DVT.  Continuing with pain management with pain medications as ordered.   72 y/o F with PMHx of T2DM, HTN, HLD, anemia, hypothyroidism, RA, fibromyalgia, remote cerebral aneurysm repair, acute hypercapnic respiratory failure now with tracheostomy, PEG tube, and bedbound (trach change 8/18, PEG change 8/14), sacral pressure ulcer, BIBEMS from home for 6 day hx of bleeding from the tracheostomy and PEG tube with associated abdominal pain, recent history of auditory and visual hallucinations, and with chronic sacral decubitus ulcer. Exam notable for mild abdominal distention with LLQ and RLQ tenderness, tracheostomy in place with no obvious bleeding, PEG tube with scant amount of dried blood, at baseline neurologic status. Imaging showing no active bleeding around tracheostomy site, however was notable for mild thickening along PEG tube tract, sacral ulcer extending to the coccyx suggestive of possible osteomyelitis, and bibasilar patchy lung opacities with volume loss. Patient admitted for respiratory support, wound care of tracheostomy and PEG, and management of sacral osteomyelitis. No further Trach and peg site bleeding noted since admission.  Pelvic MRI imaging also suggestive of Acute OM. Patient placed on long-term antibiotics with Infectious disease guidance.  Additionally treated with a 7d course of Cefepime due to PSA and Serratia tracheitis on 11/8-->11/15 and then resumed cipro/keflex.  Hospital course c/b Delirium for which Seroquel was added and home Lexapro continued. Tracheostomy changed to #9 Cuffed [place] by ENT 11/3.  Despite trach change patient remained with persistent air leak.  On 11/19, the trach was again changed due to leak and loss of volumes on vent.  Trach was changed to #8 distal cuffed [place].  Patient seen by Dermatology for back lesions.  One diagnosed as a seborrheic keratitis and the other a dermatofibroma.  Intermittent abdominal pain throughout hospital course, at times relieved with gas/BM, w/u negative, seen by GI - no recs.  12/10 pt completed cipro and keflex for osteo treatment.  12/13 moderate amounts of secretions w/ blood - tranexamic acid neb given with notable improvement.  12/18 with blood tinged secretion again - TXA 250mg neb x2 doses.  12/19 spiked fever to 102 - pancultured, negative w/u.  12/19 silver Nitrate applied to PEG site by GI for leakage.  Pt has since remained medically stable.  Receives continuous pulmonary toileting w/ duoneb, chest PT, and suctioning PRN.      1/8:  Pt still complaining of generalized pain.  right forearm and BL shoulder Xrays - official read negative for acute fractures, dislocations.  Right arm doppler negative for DVT.  Continuing with pain management with pain medications as ordered.   74 y/o F with PMHx of T2DM, HTN, HLD, anemia, hypothyroidism, RA, fibromyalgia, remote cerebral aneurysm repair, acute hypercapnic respiratory failure now with tracheostomy, PEG tube, and bedbound (trach change 8/18, PEG change 8/14), sacral pressure ulcer, BIBEMS from home for 6 day hx of bleeding from the tracheostomy and PEG tube with associated abdominal pain, recent history of auditory and visual hallucinations, and with chronic sacral decubitus ulcer. Exam notable for mild abdominal distention with LLQ and RLQ tenderness, tracheostomy in place with no obvious bleeding, PEG tube with scant amount of dried blood, at baseline neurologic status. Imaging showing no active bleeding around tracheostomy site, however was notable for mild thickening along PEG tube tract, sacral ulcer extending to the coccyx suggestive of possible osteomyelitis, and bibasilar patchy lung opacities with volume loss. Patient admitted for respiratory support, wound care of tracheostomy and PEG, and management of sacral osteomyelitis. No further Trach and peg site bleeding noted since admission.  Pelvic MRI imaging also suggestive of Acute OM. Patient placed on long-term antibiotics with Infectious disease guidance.  Additionally treated with a 7d course of Cefepime due to PSA and Serratia tracheitis on 11/8-->11/15 and then resumed cipro/keflex.  Hospital course c/b Delirium for which Seroquel was added and home Lexapro continued. Tracheostomy changed to #9 Cuffed [place] by ENT 11/3.  Despite trach change patient remained with persistent air leak.  On 11/19, the trach was again changed due to leak and loss of volumes on vent.  Trach was changed to #8 distal cuffed [place].  Patient seen by Dermatology for back lesions.  One diagnosed as a seborrheic keratitis and the other a dermatofibroma.  Intermittent abdominal pain throughout hospital course, at times relieved with gas/BM, w/u negative, seen by GI - no recs.  12/10 pt completed cipro and keflex for osteo treatment.  12/13 moderate amounts of secretions w/ blood - tranexamic acid neb given with notable improvement.  12/18 with blood tinged secretion again - TXA 250mg neb x2 doses.  12/19 spiked fever to 102 - pancultured, negative w/u.  12/19 silver Nitrate applied to PEG site by GI for leakage.  Pt has since remained medically stable.  1/7-1/8 Generalized pain, starting in forearm and BL shoulders in which an xray was performed and was negative for acute fractures/dislocation. doppler negative for DVT.  Will continue with pain management as needed.  Receives continuous pulmonary toileting w/ duoneb, chest PT, and suctioning PRN.      1/8:  Pt still complaining of generalized pain.  right forearm and BL shoulder Xrays - official read negative for acute fractures, dislocations.  Right arm doppler negative for DVT.  Continuing with pain management with pain medications as ordered.   72 y/o F with PMHx of T2DM, HTN, HLD, anemia, hypothyroidism, RA, fibromyalgia, remote cerebral aneurysm repair, acute hypercapnic respiratory failure now with tracheostomy, PEG tube, and bedbound (trach change 8/18, PEG change 8/14), sacral pressure ulcer, BIBEMS from home for 6 day hx of bleeding from the tracheostomy and PEG tube with associated abdominal pain, recent history of auditory and visual hallucinations, and with chronic sacral decubitus ulcer. Exam notable for mild abdominal distention with LLQ and RLQ tenderness, tracheostomy in place with no obvious bleeding, PEG tube with scant amount of dried blood, at baseline neurologic status. Imaging showing no active bleeding around tracheostomy site, however was notable for mild thickening along PEG tube tract, sacral ulcer extending to the coccyx suggestive of possible osteomyelitis, and bibasilar patchy lung opacities with volume loss. Patient admitted for respiratory support, wound care of tracheostomy and PEG, and management of sacral osteomyelitis. No further Trach and peg site bleeding noted since admission.  Pelvic MRI imaging also suggestive of Acute OM. Patient placed on long-term antibiotics with Infectious disease guidance.  Additionally treated with a 7d course of Cefepime due to PSA and Serratia tracheitis on 11/8-->11/15 and then resumed cipro/keflex.  Hospital course c/b Delirium for which Seroquel was added and home Lexapro continued. Tracheostomy changed to #9 Cuffed [place] by ENT 11/3.  Despite trach change patient remained with persistent air leak.  On 11/19, the trach was again changed due to leak and loss of volumes on vent.  Trach was changed to #8 distal cuffed [place].  Patient seen by Dermatology for back lesions.  One diagnosed as a seborrheic keratitis and the other a dermatofibroma.  Intermittent abdominal pain throughout hospital course, at times relieved with gas/BM, w/u negative, seen by GI - no recs.  12/10 pt completed cipro and keflex for osteo treatment.  12/13 moderate amounts of secretions w/ blood - tranexamic acid neb given with notable improvement.  12/18 with blood tinged secretion again - TXA 250mg neb x2 doses.  12/19 spiked fever to 102 - pancultured, negative w/u.  12/19 silver Nitrate applied to PEG site by GI for leakage.  Pt has since remained medically stable.  1/7-1/8 Generalized pain, starting in forearm and BL shoulders in which an xray was performed and was negative for acute fractures/dislocation. doppler negative for DVT.  Will continue with pain management as needed.  Receives continuous pulmonary toileting w/ duoneb, chest PT, and suctioning PRN.      1/8:  Pt still complaining of generalized pain.  right forearm and BL shoulder Xrays - official read negative for acute fractures, dislocations.  Right arm doppler negative for DVT.  Continuing with pain management with pain medications as ordered.

## 2024-01-08 NOTE — PROGRESS NOTE ADULT - PROBLEM SELECTOR PLAN 7
- Continue home Prednisone regimen 5 mg daily   *fibromyalgia  - continue home Gabapentin 100mg daily  - continue home Fentanyl 25mcg Q72H patches  - c/w Lidocaine patch   - Oxycodone 2.5mg q6 hrs PRN (in addition to Tylenol PRN) - Continue home Prednisone regimen 5 mg daily   *fibromyalgia  - continue home Gabapentin 100mg daily  - continue home Fentanyl 25mcg Q72H patches  - c/w Lidocaine patch   - Oxycodone 2.5mg q6 hrs PRN (in addition to Tylenol PRN)  - 1/7-1/8 Right forearm and BL shoulder pain - w/u negative for DVT, imaging negative for acute fracture/dislocation, will continue with pain management as needed with meds above

## 2024-01-08 NOTE — PROGRESS NOTE ADULT - SUBJECTIVE AND OBJECTIVE BOX
Patient is a 73y old  Female who presents with a chief complaint of PEG Bleeding (22 Dec 2023 07:35)      Interval Events:    REVIEW OF SYSTEMS:  [ ] Positive  [ ] All other systems negative  [ ] Unable to assess ROS because ________    Vital Signs Last 24 Hrs  T(C): 36.7 (01-08-24 @ 05:41), Max: 37.2 (01-07-24 @ 21:52)  T(F): 98.1 (01-08-24 @ 05:41), Max: 98.9 (01-07-24 @ 21:52)  HR: 90 (01-08-24 @ 05:41) (79 - 99)  BP: 111/56 (01-08-24 @ 05:41) (111/56 - 140/59)  RR: 15 (01-08-24 @ 05:41) (15 - 19)  SpO2: 100% (01-08-24 @ 05:41) (100% - 100%)    PHYSICAL EXAM:  HEENT:   [ ]Tracheostomy:  [ ]Pupils equal  [ ]No oral lesions  [ ]Abnormal    SKIN  [ ]No Rash  [ ] Abnormal  [ ] pressure    CARDIAC  [ ]Regular  [ ]Abnormal    PULMONARY  [ ]Bilateral Clear Breath Sounds  [ ]Normal Excursion  [ ]Abnormal    GI  [ ]PEG      [ ] +BS		              [ ]Soft, nondistended, nontender	  [ ]Abnormal    MUSCULOSKELETAL                                   [ ]Bedbound                 [ ]Abnormal    [ ]Ambulatory/OOB to chair                           EXTREMITIES                                         [ ]Normal  [ ]Edema                           NEUROLOGIC  [ ] Normal, non focal  [ ] Focal findings:    PSYCHIATRIC  [ ]Alert and appropriate  [ ] Sedated	 [ ]Agitated    :  Mcbride: [ ] Yes, if yes: Date of Placement:                   [  ] No    LINES: Central Lines [ ] Yes, if yes: Date of Placement                                     [  ] No    HOSPITAL MEDICATIONS:  MEDICATIONS  (STANDING):  albuterol/ipratropium for Nebulization 3 milliLiter(s) Nebulizer every 8 hours  amLODIPine   Tablet 10 milliGRAM(s) Oral daily  artificial  tears Solution 2 Drop(s) Both EYES four times a day  ascorbic acid 500 milliGRAM(s) Oral daily  calcium carbonate    500 mG (Tums) Chewable 1 Tablet(s) Chew every 12 hours  chlorhexidine 0.12% Liquid 15 milliLiter(s) Oral Mucosa every 12 hours  chlorhexidine 4% Liquid 1 Application(s) Topical <User Schedule>  dextrose 50% Injectable 25 Gram(s) IV Push once  dextrose 50% Injectable 12.5 Gram(s) IV Push once  escitalopram 10 milliGRAM(s) Oral <User Schedule>  fentaNYL   Patch  25 MICROgram(s)/Hr 1 Patch Transdermal every 72 hours  gabapentin Solution 100 milliGRAM(s) Enteral Tube at bedtime  glucagon  Injectable 1 milliGRAM(s) IntraMuscular once  hemorrhoidal Ointment      hemorrhoidal Ointment 1 Application(s) Rectal daily  insulin glargine Injectable (LANTUS) 19 Unit(s) SubCutaneous every morning  insulin regular  human corrective regimen sliding scale   SubCutaneous every 6 hours  insulin regular  human recombinant 9 Unit(s) SubCutaneous <User Schedule>  insulin regular  human recombinant 34 Unit(s) SubCutaneous <User Schedule>  insulin regular  human recombinant 17 Unit(s) SubCutaneous <User Schedule>  levothyroxine 125 MICROGram(s) Oral <User Schedule>  lidocaine   4% Patch 1 Patch Transdermal daily  lidocaine   4% Patch 1 Patch Transdermal daily  lidocaine 2% Viscous 5 milliLiter(s) Mucosal two times a day  melatonin 5 milliGRAM(s) Oral at bedtime  melatonin 1 milliGRAM(s) Oral at bedtime  multivitamin/minerals/iron Oral Solution (CENTRUM) 15 milliLiter(s) Oral daily  nystatin Powder 1 Application(s) Topical every 8 hours  pantoprazole   Suspension 40 milliGRAM(s) Enteral Tube <User Schedule>  petrolatum Ophthalmic Ointment 1 Application(s) Both EYES four times a day  predniSONE   Tablet 5 milliGRAM(s) Oral daily  QUEtiapine 12.5 milliGRAM(s) Oral <User Schedule>  silver nitrate Applicator 1 Application(s) Topical once  simethicone 80 milliGRAM(s) Chew four times a day  zinc oxide 40% Paste 1 Application(s) Topical daily    MEDICATIONS  (PRN):  acetaminophen   Oral Liquid .. 1000 milliGRAM(s) Enteral Tube every 6 hours PRN Temp greater or equal to 38C (100.4F), Mild Pain (1 - 3)  benzocaine 20% Spray 1 Spray(s) Topical four times a day PRN Cough  diclofenac sodium 1% Gel 2 Gram(s) Topical four times a day PRN pain  diphenhydrAMINE Elixir 25 milliGRAM(s) Oral every 6 hours PRN Rash and/or Itching  guaifenesin/dextromethorphan Oral Liquid 10 milliLiter(s) Oral every 6 hours PRN Cough  oxyCODONE    Solution 2.5 milliGRAM(s) Oral every 6 hours PRN Moderate Pain (4 - 6)  sodium chloride 0.65% Nasal 1 Spray(s) Both Nostrils every 6 hours PRN Nasal Congestion      LABS:                  CAPILLARY BLOOD GLUCOSE    MICROBIOLOGY:     RADIOLOGY:  [ ] Reviewed and interpreted by me    Mode: AC/ CMV (Assist Control/ Continuous Mandatory Ventilation)  RR (machine): 12  TV (machine): 300  FiO2: 30  PEEP: 5  ITime: 1  MAP: 11  PIP: 27   Patient is a 73y old  Female who presents with a chief complaint of PEG Bleeding (22 Dec 2023 07:35)      Interval Events:  No acute events overnight.     REVIEW OF SYSTEMS:  [ ] Positive  [X] All other systems negative  [ ] Unable to assess ROS because ________    Vital Signs Last 24 Hrs  T(C): 36.7 (01-08-24 @ 05:41), Max: 37.2 (01-07-24 @ 21:52)  T(F): 98.1 (01-08-24 @ 05:41), Max: 98.9 (01-07-24 @ 21:52)  HR: 90 (01-08-24 @ 05:41) (79 - 99)  BP: 111/56 (01-08-24 @ 05:41) (111/56 - 140/59)  RR: 15 (01-08-24 @ 05:41) (15 - 19)  SpO2: 100% (01-08-24 @ 05:41) (100% - 100%)    PHYSICAL EXAM:  HEENT:   [X]Tracheostomy: #8 distal XLT Shiley  [X]Pupils equal  [ ]No oral lesions  [X] Abnormal: missing dentition; +teeth caries; +loose bottom teeth    SKIN  [ ]No Rash  [ ] Abnormal  [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    CARDIAC  [X]Regular  [ ]Abnormal    PULMONARY  [X]Bilateral Clear Breath Sounds  [ ]Normal Excursion  [ ]Abnormal    GI  [X]PEG      [X] +BS		              [X]Soft, nondistended, nontender	  [ ]Abnormal    MUSCULOSKELETAL                                   [X]Bedbound                 [ ]Abnormal    [ ]Ambulatory/OOB to chair                           EXTREMITIES                                         [X]Normal  [ ]Edema                           NEUROLOGIC  [ ] Normal, non focal  [X] Focal findings: opens eyes spontaneously, follows commands on all 4 extremities, able to mouth some words/express her needs at times    PSYCHIATRIC  [X]Alert and appropriate  [ ] Sedated	 [ ]Agitated    :  Mcbride: [ ] Yes, if yes: Date of Placement:                   [X] No    LINES: Central Lines [ ] Yes, if yes: Date of Placement                                     [X] No    HOSPITAL MEDICATIONS:  MEDICATIONS  (STANDING):  albuterol/ipratropium for Nebulization 3 milliLiter(s) Nebulizer every 8 hours  amLODIPine   Tablet 10 milliGRAM(s) Oral daily  artificial  tears Solution 2 Drop(s) Both EYES four times a day  ascorbic acid 500 milliGRAM(s) Oral daily  calcium carbonate    500 mG (Tums) Chewable 1 Tablet(s) Chew every 12 hours  chlorhexidine 0.12% Liquid 15 milliLiter(s) Oral Mucosa every 12 hours  chlorhexidine 4% Liquid 1 Application(s) Topical <User Schedule>  dextrose 50% Injectable 25 Gram(s) IV Push once  dextrose 50% Injectable 12.5 Gram(s) IV Push once  escitalopram 10 milliGRAM(s) Oral <User Schedule>  fentaNYL   Patch  25 MICROgram(s)/Hr 1 Patch Transdermal every 72 hours  gabapentin Solution 100 milliGRAM(s) Enteral Tube at bedtime  glucagon  Injectable 1 milliGRAM(s) IntraMuscular once  hemorrhoidal Ointment      hemorrhoidal Ointment 1 Application(s) Rectal daily  insulin glargine Injectable (LANTUS) 19 Unit(s) SubCutaneous every morning  insulin regular  human corrective regimen sliding scale   SubCutaneous every 6 hours  insulin regular  human recombinant 9 Unit(s) SubCutaneous <User Schedule>  insulin regular  human recombinant 34 Unit(s) SubCutaneous <User Schedule>  insulin regular  human recombinant 17 Unit(s) SubCutaneous <User Schedule>  levothyroxine 125 MICROGram(s) Oral <User Schedule>  lidocaine   4% Patch 1 Patch Transdermal daily  lidocaine   4% Patch 1 Patch Transdermal daily  lidocaine 2% Viscous 5 milliLiter(s) Mucosal two times a day  melatonin 5 milliGRAM(s) Oral at bedtime  melatonin 1 milliGRAM(s) Oral at bedtime  multivitamin/minerals/iron Oral Solution (CENTRUM) 15 milliLiter(s) Oral daily  nystatin Powder 1 Application(s) Topical every 8 hours  pantoprazole   Suspension 40 milliGRAM(s) Enteral Tube <User Schedule>  petrolatum Ophthalmic Ointment 1 Application(s) Both EYES four times a day  predniSONE   Tablet 5 milliGRAM(s) Oral daily  QUEtiapine 12.5 milliGRAM(s) Oral <User Schedule>  silver nitrate Applicator 1 Application(s) Topical once  simethicone 80 milliGRAM(s) Chew four times a day  zinc oxide 40% Paste 1 Application(s) Topical daily    MEDICATIONS  (PRN):  acetaminophen   Oral Liquid .. 1000 milliGRAM(s) Enteral Tube every 6 hours PRN Temp greater or equal to 38C (100.4F), Mild Pain (1 - 3)  benzocaine 20% Spray 1 Spray(s) Topical four times a day PRN Cough  diclofenac sodium 1% Gel 2 Gram(s) Topical four times a day PRN pain  diphenhydrAMINE Elixir 25 milliGRAM(s) Oral every 6 hours PRN Rash and/or Itching  guaifenesin/dextromethorphan Oral Liquid 10 milliLiter(s) Oral every 6 hours PRN Cough  oxyCODONE    Solution 2.5 milliGRAM(s) Oral every 6 hours PRN Moderate Pain (4 - 6)  sodium chloride 0.65% Nasal 1 Spray(s) Both Nostrils every 6 hours PRN Nasal Congestion      LABS:      CAPILLARY BLOOD GLUCOSE    MICROBIOLOGY:     RADIOLOGY:  [ ] Reviewed and interpreted by me    Mode: AC/ CMV (Assist Control/ Continuous Mandatory Ventilation)  RR (machine): 12  TV (machine): 300  FiO2: 30  PEEP: 5  ITime: 1  MAP: 11  PIP: 27

## 2024-01-08 NOTE — PROGRESS NOTE ADULT - PROBLEM SELECTOR PLAN 1
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 aureus  - Sputum Cx 11/6: + PSA and Serratia, Cefepime 11/8 -->11/15  - 11/26 sputum culture MDR pseudomonas - clinically stable, defer treatment unless decompensates as per ID  - 11/26 urine culture - enterococcus - no need to treat - cfu low, organism not significant as per ID  - 12/19 respiked fever - blood cultures Neg (12/19) - received dose of vanco and cefepime - d/c'd as w/u negative  - observe off Abx

## 2024-01-08 NOTE — PROGRESS NOTE ADULT - PROBLEM SELECTOR PLAN 6
- Echo from 10/17/2020 notable for hyperdynamic L ventricular systolic function, moderately severe LVOT obstruction, and EF 81% (per Wilkinson calculation) vs 77% (per Teichholz calculation)  - Amlodipine 10mg QD - BP is well controlled

## 2024-01-08 NOTE — PROGRESS NOTE ADULT - NS ATTEND AMEND GEN_ALL_CORE FT
73F with a h/o DM, HTN, HLD, anemia, hypothyroidism, RA, fibromyalgia, remote cerebral aneurysm with repair, hypercapnic respiratory failure s/p tracheostomy/PEG, bedbound, sacral pressure ulcer. Admitted w/ bleeding from tracheostomy and PEG w/ associated abdominal pain, recent hx of auditory/visual hallucinations. Imaging showed mild thickening along PEG tube tract and sacral ulcer extending to coccyx suggestive of acute osteomyelitis.    Remains on chronic mech vent, tolerating PS trials during the day for periods of time but unable to fully wean and unfortunately she will continue to be ventilator dependent  Tolerating PEG feeds  Sacral OM s/p 6 week course of Cipro and Keflex as per ID - completed 12/10/23. Appreciate wound care follow up.   c/w current pain regimen - in setting of fibromyalgia and pain from wound.  Today daughter reports that patient is having bilateral shoulder pain and right arm pain.   Patient is pending US of right arm as well as should xray. May need CT scan if negative, patient with significant decreased ROM of right shoulder.   Agree with plan as outlined above.

## 2024-01-08 NOTE — PROGRESS NOTE ADULT - PROBLEM SELECTOR PLAN 5
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   - PEG Site appears macerated --> Multifactorial, possible the PEG has been too tight at home  - Peg loosened by GI at beside, no leakage or further intervention required   - recurrent RLQ abdominal pain and bleeding at the PEG site, resolved  - reevaluated by GI -- no bleeding at the PEG site  - 12/1: s/p abdominal ultrasound - limited study   - (12/3): Pt is c/o abd pain, and daughter is concerned, which resolved   - AM amylase and Lipase all wnl , CT A/P 12/3 - no acute findings  -- pt is tolerating tube feeds, + BMs   - Continue Simethicone    PEG Leak, resolved   - 12/20 silver nitrate applied to PEG site by GI

## 2024-01-09 ENCOUNTER — TRANSCRIPTION ENCOUNTER (OUTPATIENT)
Age: 74
End: 2024-01-09

## 2024-01-09 LAB
GLUCOSE BLDC GLUCOMTR-MCNC: 139 MG/DL — HIGH (ref 70–99)
GLUCOSE BLDC GLUCOMTR-MCNC: 139 MG/DL — HIGH (ref 70–99)
GLUCOSE BLDC GLUCOMTR-MCNC: 147 MG/DL — HIGH (ref 70–99)
GLUCOSE BLDC GLUCOMTR-MCNC: 147 MG/DL — HIGH (ref 70–99)
GLUCOSE BLDC GLUCOMTR-MCNC: 161 MG/DL — HIGH (ref 70–99)
GLUCOSE BLDC GLUCOMTR-MCNC: 161 MG/DL — HIGH (ref 70–99)
GLUCOSE BLDC GLUCOMTR-MCNC: 186 MG/DL — HIGH (ref 70–99)
GLUCOSE BLDC GLUCOMTR-MCNC: 186 MG/DL — HIGH (ref 70–99)
GLUCOSE BLDC GLUCOMTR-MCNC: 189 MG/DL — HIGH (ref 70–99)
GLUCOSE BLDC GLUCOMTR-MCNC: 189 MG/DL — HIGH (ref 70–99)
T4 FREE SERPL-MCNC: 1.4 NG/DL — SIGNIFICANT CHANGE UP (ref 0.9–1.8)
T4 FREE SERPL-MCNC: 1.4 NG/DL — SIGNIFICANT CHANGE UP (ref 0.9–1.8)
TSH SERPL-MCNC: 1.66 UIU/ML — SIGNIFICANT CHANGE UP (ref 0.27–4.2)
TSH SERPL-MCNC: 1.66 UIU/ML — SIGNIFICANT CHANGE UP (ref 0.27–4.2)

## 2024-01-09 PROCEDURE — 99497 ADVNCD CARE PLAN 30 MIN: CPT

## 2024-01-09 PROCEDURE — 99232 SBSQ HOSP IP/OBS MODERATE 35: CPT

## 2024-01-09 RX ORDER — OXYCODONE HYDROCHLORIDE 5 MG/1
5 TABLET ORAL EVERY 6 HOURS
Refills: 0 | Status: DISCONTINUED | OUTPATIENT
Start: 2024-01-09 | End: 2024-01-11

## 2024-01-09 RX ORDER — OXYCODONE HYDROCHLORIDE 5 MG/1
2.5 TABLET ORAL ONCE
Refills: 0 | Status: COMPLETED | OUTPATIENT
Start: 2024-01-09 | End: 2024-01-09

## 2024-01-09 RX ORDER — GABAPENTIN 400 MG/1
250 CAPSULE ORAL AT BEDTIME
Refills: 0 | Status: DISCONTINUED | OUTPATIENT
Start: 2024-01-09 | End: 2024-01-17

## 2024-01-09 RX ADMIN — Medication 2 DROP(S): at 17:40

## 2024-01-09 RX ADMIN — Medication 5 MILLIGRAM(S): at 21:41

## 2024-01-09 RX ADMIN — OXYCODONE HYDROCHLORIDE 5 MILLIGRAM(S): 5 TABLET ORAL at 21:41

## 2024-01-09 RX ADMIN — INSULIN HUMAN 1: 100 INJECTION, SOLUTION SUBCUTANEOUS at 17:41

## 2024-01-09 RX ADMIN — SIMETHICONE 80 MILLIGRAM(S): 80 TABLET, CHEWABLE ORAL at 11:34

## 2024-01-09 RX ADMIN — INSULIN HUMAN 34 UNIT(S): 100 INJECTION, SOLUTION SUBCUTANEOUS at 06:06

## 2024-01-09 RX ADMIN — OXYCODONE HYDROCHLORIDE 2.5 MILLIGRAM(S): 5 TABLET ORAL at 03:40

## 2024-01-09 RX ADMIN — ESCITALOPRAM OXALATE 10 MILLIGRAM(S): 10 TABLET, FILM COATED ORAL at 08:26

## 2024-01-09 RX ADMIN — LIDOCAINE 1 PATCH: 4 CREAM TOPICAL at 22:55

## 2024-01-09 RX ADMIN — LIDOCAINE 1 PATCH: 4 CREAM TOPICAL at 11:34

## 2024-01-09 RX ADMIN — Medication 1 APPLICATION(S): at 11:34

## 2024-01-09 RX ADMIN — INSULIN GLARGINE 19 UNIT(S): 100 INJECTION, SOLUTION SUBCUTANEOUS at 08:27

## 2024-01-09 RX ADMIN — ZINC OXIDE 1 APPLICATION(S): 200 OINTMENT TOPICAL at 11:35

## 2024-01-09 RX ADMIN — NYSTATIN CREAM 1 APPLICATION(S): 100000 CREAM TOPICAL at 21:41

## 2024-01-09 RX ADMIN — Medication 5 MILLIGRAM(S): at 06:03

## 2024-01-09 RX ADMIN — CHLORHEXIDINE GLUCONATE 1 APPLICATION(S): 213 SOLUTION TOPICAL at 06:05

## 2024-01-09 RX ADMIN — DICLOFENAC SODIUM 2 GRAM(S): 30 GEL TOPICAL at 11:36

## 2024-01-09 RX ADMIN — CHLORHEXIDINE GLUCONATE 15 MILLILITER(S): 213 SOLUTION TOPICAL at 17:40

## 2024-01-09 RX ADMIN — PANTOPRAZOLE SODIUM 40 MILLIGRAM(S): 20 TABLET, DELAYED RELEASE ORAL at 08:27

## 2024-01-09 RX ADMIN — LIDOCAINE 1 PATCH: 4 CREAM TOPICAL at 20:35

## 2024-01-09 RX ADMIN — LIDOCAINE 1 PATCH: 4 CREAM TOPICAL at 20:36

## 2024-01-09 RX ADMIN — QUETIAPINE FUMARATE 12.5 MILLIGRAM(S): 200 TABLET, FILM COATED ORAL at 17:40

## 2024-01-09 RX ADMIN — Medication 2 DROP(S): at 06:05

## 2024-01-09 RX ADMIN — Medication 1000 MILLIGRAM(S): at 16:58

## 2024-01-09 RX ADMIN — Medication 1 APPLICATION(S): at 17:40

## 2024-01-09 RX ADMIN — Medication 15 MILLILITER(S): at 11:33

## 2024-01-09 RX ADMIN — Medication 3 MILLILITER(S): at 14:00

## 2024-01-09 RX ADMIN — Medication 3 MILLILITER(S): at 05:38

## 2024-01-09 RX ADMIN — Medication 2 DROP(S): at 11:34

## 2024-01-09 RX ADMIN — Medication 3 MILLILITER(S): at 21:18

## 2024-01-09 RX ADMIN — SIMETHICONE 80 MILLIGRAM(S): 80 TABLET, CHEWABLE ORAL at 06:04

## 2024-01-09 RX ADMIN — LIDOCAINE 5 MILLILITER(S): 4 CREAM TOPICAL at 06:05

## 2024-01-09 RX ADMIN — OXYCODONE HYDROCHLORIDE 5 MILLIGRAM(S): 5 TABLET ORAL at 22:30

## 2024-01-09 RX ADMIN — CHLORHEXIDINE GLUCONATE 15 MILLILITER(S): 213 SOLUTION TOPICAL at 06:04

## 2024-01-09 RX ADMIN — GABAPENTIN 250 MILLIGRAM(S): 400 CAPSULE ORAL at 21:41

## 2024-01-09 RX ADMIN — Medication 500 MILLIGRAM(S): at 11:33

## 2024-01-09 RX ADMIN — NYSTATIN CREAM 1 APPLICATION(S): 100000 CREAM TOPICAL at 13:08

## 2024-01-09 RX ADMIN — LIDOCAINE 5 MILLILITER(S): 4 CREAM TOPICAL at 17:41

## 2024-01-09 RX ADMIN — INSULIN HUMAN 17 UNIT(S): 100 INJECTION, SOLUTION SUBCUTANEOUS at 12:07

## 2024-01-09 RX ADMIN — NYSTATIN CREAM 1 APPLICATION(S): 100000 CREAM TOPICAL at 06:04

## 2024-01-09 RX ADMIN — PHENYLEPHRINE-SHARK LIVER OIL-MINERAL OIL-PETROLATUM RECTAL OINTMENT 1 APPLICATION(S): at 11:36

## 2024-01-09 RX ADMIN — INSULIN HUMAN 9 UNIT(S): 100 INJECTION, SOLUTION SUBCUTANEOUS at 17:41

## 2024-01-09 RX ADMIN — Medication 1 MILLIGRAM(S): at 21:41

## 2024-01-09 RX ADMIN — Medication 1 APPLICATION(S): at 06:05

## 2024-01-09 RX ADMIN — OXYCODONE HYDROCHLORIDE 2.5 MILLIGRAM(S): 5 TABLET ORAL at 12:03

## 2024-01-09 RX ADMIN — Medication 1 TABLET(S): at 06:04

## 2024-01-09 RX ADMIN — INSULIN HUMAN 1: 100 INJECTION, SOLUTION SUBCUTANEOUS at 12:06

## 2024-01-09 RX ADMIN — OXYCODONE HYDROCHLORIDE 5 MILLIGRAM(S): 5 TABLET ORAL at 15:24

## 2024-01-09 RX ADMIN — Medication 1 TABLET(S): at 17:39

## 2024-01-09 RX ADMIN — AMLODIPINE BESYLATE 10 MILLIGRAM(S): 2.5 TABLET ORAL at 06:04

## 2024-01-09 RX ADMIN — Medication 125 MICROGRAM(S): at 06:04

## 2024-01-09 RX ADMIN — SIMETHICONE 80 MILLIGRAM(S): 80 TABLET, CHEWABLE ORAL at 17:40

## 2024-01-09 NOTE — PROGRESS NOTE ADULT - ASSESSMENT
72 y/o F w/h/o T2DM with unknown control due to anemia of chronic disease skewing A1C levels. On Levemir and Humalog insulin PTA. Unknown DM complications. Also h/o HTN, HLD, anemia, hypothyroidism, RA, fibromyalgia, remote cerebral aneurysm repair, acute hypercapnic respiratory failure now with tracheostomy, PEG tube, and bedbound (trach change 8/18, PEG change 8/14), sacral pressure ulcer, BIBEMS from home for 6 day hx of bleeding from the tracheostomy and PEG tube with associated abdominal pain> now resolved. Endocrinology consulted for hyperglycemia. Tolerating TFs with BG at goal while on present insulin doses. No hypoglycemia. Will continue present insulin doses to keep BG Goal 100-180mg/dl                                  74 y/o F w/h/o T2DM with unknown control due to anemia of chronic disease skewing A1C levels. On Levemir and Humalog insulin PTA. Unknown DM complications. Also h/o HTN, HLD, anemia, hypothyroidism, RA, fibromyalgia, remote cerebral aneurysm repair, acute hypercapnic respiratory failure now with tracheostomy, PEG tube, and bedbound (trach change 8/18, PEG change 8/14), sacral pressure ulcer, BIBEMS from home for 6 day hx of bleeding from the tracheostomy and PEG tube with associated abdominal pain> now resolved. Endocrinology consulted for hyperglycemia. Tolerating TFs with BG at goal while on present insulin doses. No hypoglycemia. Will continue present insulin doses to keep BG Goal 100-180mg/dl

## 2024-01-09 NOTE — DISCHARGE NOTE NURSING/CASE MANAGEMENT/SOCIAL WORK - NSSCCARECORD_GEN_ALL_CORE
Home Care Agency/Durable Medical Equipment Agency/Community [facility] Home Care Agency/Durable Medical Equipment Agency/Community [facility] Home Care Agency/Durable Medical Equipment Agency/Community [facility]

## 2024-01-09 NOTE — PROGRESS NOTE ADULT - PROBLEM SELECTOR PLAN 1
- FS BG monitoring [id number] while on TFs/NPO   - Continue lantus 18 units sc qAM   - Continue Regular insulin 34 units sc at 6am, 17 units sc at 12 noon and 9 units sc at 6pm. PLEASE DO NOT HOLD IF PATIENT IS ON TUBE FEEDS (hold only if TF are stopped). Can decrease/adjust dose if there is a concern for hypoglycemia.   - C/w low dose admelog correction scale every 6 hours  - Will follow and adjust insulin as needed  DISCHARGE:  - Will be determined according to BG/insulin needs/TFs and steroid therapy at time of discharge. If discharge within next 24 hours will continue with same insulin types and doses as noted above with the exception of Admelog correction scale.   - Needs telemedicine as outpatient due to bedbound status. Can follow up at [facility] practice. [address] [place] [address] [address]. Phone .   -Make sure pt has Rx for all DM supplies and insulin. - FS BG monitoring [id number] while on TFs/NPO   - Continue lantus 18 units sc qAM   - Continue Regular insulin 34 units sc at 6am, 17 units sc at 12 noon and 9 units sc at 6pm. PLEASE DO NOT HOLD IF PATIENT IS ON TUBE FEEDS (hold only if TF are stopped). Can decrease/adjust dose if there is a concern for hypoglycemia.   - C/w low dose admelog correction scale every 6 hours  - Will follow and adjust insulin as needed  DISCHARGE:  - Will be determined according to BG/insulin needs/TFs and steroid therapy at time of discharge. If discharge within next 24 hours will continue with same insulin types and doses as noted above with the exception of Admelog correction scale.   - Needs telemedicine as outpatient due to bedbound status. Can follow up at [facility] practice. [address] [place] [address] [address]. Phone .   -Make sure pt has Rx for all DM supplies and insulin.

## 2024-01-09 NOTE — PROGRESS NOTE ADULT - SUBJECTIVE AND OBJECTIVE BOX
DIABETES FOLLOW UP NOTE: Saw pt earlier today    Chief Complaint: Endocrine consult requested for management of T2DM    INTERVAL HX: Pt stable, alert, able to nod no to pain. Per staff, pt tolerating TFs of [employer] glucose support 1.2 at 60cc/hr from 6am to 2am with BG levels mostly at goal without hypoglycemia. On Prednisone 5mg.        Review of Systems:  General: As above. Nodding answers  Cardiovascular: No chest pain, palpitations  Respiratory: No SOB, no cough  GI: No nausea, vomiting, abdominal pain  Endocrine: No  S&Sx of hypoglycemia    Allergies    penicillin (Unknown)  heparin (Unknown)  Tagamet (Unknown)  pineapple (Unknown)  walnut (Unknown)  Pecan, Filbert, Hazelnut (Unknown)  Lyrica (Unknown)    Intolerances      MEDICATIONS:  insulin glargine Injectable (LANTUS) 19 Unit(s) SubCutaneous every morning  insulin regular  human corrective regimen sliding scale   SubCutaneous every 6 hours  insulin regular  human recombinant 9 Unit(s) SubCutaneous <User Schedule>  insulin regular  human recombinant 34 Unit(s) SubCutaneous <User Schedule>  insulin regular  human recombinant 17 Unit(s) SubCutaneous <User Schedule>  levothyroxine 125 MICROGram(s) Oral <User Schedule>  predniSONE   Tablet 5 milliGRAM(s) Oral daily      PHYSICAL EXAM:  VITALS: T(C): 37 (01-09-24 @ 13:21)  T(F): 98.6 (01-09-24 @ 13:21), Max: 98.6 (01-09-24 @ 13:21)  HR: 84 (01-09-24 @ 14:08) (72 - 111)  BP: 151/79 (01-09-24 @ 13:21) (124/63 - 151/79)  RR:  (16 - 18)  SpO2:  (98% - 100%)  Wt(kg): --  GENERAL: Female laying on bed in NAD. HHA at bedside  HEENT: Trach in place D&I  Abdomen: Soft, nontender, non distended. PEG in place with TFs going at 60cc/hr at time of visit  Extremities: Warm, mild edema in feet and hands   NEURO: Awake, nodding to questions     LABS:  POCT Blood Glucose.: 189 mg/dL (01-09-24 @ 12:02)  POCT Blood Glucose.: 161 mg/dL (01-09-24 @ 08:24)  POCT Blood Glucose.: 147 mg/dL (01-09-24 @ 05:24)  POCT Blood Glucose.: 178 mg/dL (01-08-24 @ 23:45)  POCT Blood Glucose.: 167 mg/dL (01-08-24 @ 17:17)  POCT Blood Glucose.: 229 mg/dL (01-08-24 @ 11:56)  POCT Blood Glucose.: 128 mg/dL (01-08-24 @ 08:24)  POCT Blood Glucose.: 184 mg/dL (01-08-24 @ 06:01)  POCT Blood Glucose.: 142 mg/dL (01-07-24 @ 23:40)  POCT Blood Glucose.: 165 mg/dL (01-07-24 @ 17:28)  POCT Blood Glucose.: 218 mg/dL (01-07-24 @ 12:03)  POCT Blood Glucose.: 240 mg/dL (01-07-24 @ 08:57)  POCT Blood Glucose.: 177 mg/dL (01-07-24 @ 06:02)  POCT Blood Glucose.: 233 mg/dL (01-07-24 @ 00:22)  POCT Blood Glucose.: 176 mg/dL (01-06-24 @ 17:30)      Thyroid Function Tests:  01-09 @ 06:46 TSH 1.66 FreeT4 1.4 T3 -- Anti TPO -- Anti Thyroglobulin Ab -- TSI --      A1C with Estimated Average Glucose Result: 7.5 % (10-28-23 @ 07:18)      Estimated Average Glucose: 169 mg/dL (10-28-23 @ 07:18)        11-25 Chol -- Direct LDL -- LDL calculated -- HDL -- Trig 192<H>                 DIABETES FOLLOW UP NOTE: Saw pt earlier today    Chief Complaint: Endocrine consult requested for management of T2DM    INTERVAL HX: Pt stable, alert, able to nod no to pain. Per staff, pt tolerating TFs of [employer] glucose support 1.2 at 60cc/hr from 6am to 2am with BG levels mostly at goal without hypoglycemia. On Prednisone 5mg.        Review of Systems:  General: As above. Nodding answers  Cardiovascular: No chest pain, palpitations  Respiratory: No SOB, no cough  GI: No nausea, vomiting, abdominal pain  Endocrine: No  S&Sx of hypoglycemia    Allergies    penicillin (Unknown)  heparin (Unknown)  Tagamet (Unknown)  pineapple (Unknown)  walnut (Unknown)  Pecan, Filbert, Hazelnut (Unknown)  Lyrica (Unknown)    Intolerances      MEDICATIONS:  insulin glargine Injectable (LANTUS) 19 Unit(s) SubCutaneous every morning  insulin regular  human corrective regimen sliding scale   SubCutaneous every 6 hours  insulin regular  human recombinant 9 Unit(s) SubCutaneous <User Schedule>  insulin regular  human recombinant 34 Unit(s) SubCutaneous <User Schedule>  insulin regular  human recombinant 17 Unit(s) SubCutaneous <User Schedule>  levothyroxine 125 MICROGram(s) Oral <User Schedule>  predniSONE   Tablet 5 milliGRAM(s) Oral daily      PHYSICAL EXAM:  VITALS: T(C): 37 (01-09-24 @ 13:21)  T(F): 98.6 (01-09-24 @ 13:21), Max: 98.6 (01-09-24 @ 13:21)  HR: 84 (01-09-24 @ 14:08) (72 - 111)  BP: 151/79 (01-09-24 @ 13:21) (124/63 - 151/79)  RR:  (16 - 18)  SpO2:  (98% - 100%)  Wt(kg): --  GENERAL: Female laying on bed in NAD. HHA at bedside  HEENT: Trach in place D&I  Abdomen: Soft, nontender, non distended. PEG in place with TFs going at 60cc/hr at time of visit  Extremities: Warm, mild edema in feet and hands   NEURO: Awake, nodding to questions     LABS:  POCT Blood Glucose.: 189 mg/dL (01-09-24 @ 12:02)  POCT Blood Glucose.: 161 mg/dL (01-09-24 @ 08:24)  POCT Blood Glucose.: 147 mg/dL (01-09-24 @ 05:24)  POCT Blood Glucose.: 178 mg/dL (01-08-24 @ 23:45)  POCT Blood Glucose.: 167 mg/dL (01-08-24 @ 17:17)  POCT Blood Glucose.: 229 mg/dL (01-08-24 @ 11:56)  POCT Blood Glucose.: 128 mg/dL (01-08-24 @ 08:24)  POCT Blood Glucose.: 184 mg/dL (01-08-24 @ 06:01)  POCT Blood Glucose.: 142 mg/dL (01-07-24 @ 23:40)  POCT Blood Glucose.: 165 mg/dL (01-07-24 @ 17:28)  POCT Blood Glucose.: 218 mg/dL (01-07-24 @ 12:03)  POCT Blood Glucose.: 240 mg/dL (01-07-24 @ 08:57)  POCT Blood Glucose.: 177 mg/dL (01-07-24 @ 06:02)  POCT Blood Glucose.: 233 mg/dL (01-07-24 @ 00:22)  POCT Blood Glucose.: 176 mg/dL (01-06-24 @ 17:30)      Thyroid Function Tests:  01-09 @ 06:46 TSH 1.66 FreeT4 1.4 T3 -- Anti TPO -- Anti Thyroglobulin Ab -- TSI --      A1C with Estimated Average Glucose Result: 7.5 % (10-28-23 @ 07:18)      Estimated Average Glucose: 169 mg/dL (10-28-23 @ 07:18)        11-25 Chol -- Direct LDL -- LDL calculated -- HDL -- Trig 192<H>

## 2024-01-09 NOTE — DISCHARGE NOTE NURSING/CASE MANAGEMENT/SOCIAL WORK - PATIENT PORTAL LINK FT
You can access the FollowMyHealth Patient Portal offered by [facility] by registering at the following website: [email or web address]. By joining [employer]’s FollowMyHealth portal, you will also be able to view your health information using other applications (apps) compatible with our system. You can access the FollowMyHealth Patient Portal offered by [facility] by registering at the following website: [email or web address]. By joining [employer]’s FollowMyHealth portal, you will also be able to view your health information using other applications (apps) compatible with our system. You can access the FollowMyHealth Patient Portal offered by [facility] by registering at the following website: [email or web address]. By joining [employer]’s FollowMyHealth portal, you will also be able to view your health information using other applications (apps) compatible with our system.

## 2024-01-09 NOTE — DISCHARGE NOTE NURSING/CASE MANAGEMENT/SOCIAL WORK - CAREGIVER ADDRESS
[address] [facility], [id number] [address] [facility], [id number] [address] [facility], [id number]

## 2024-01-09 NOTE — PROGRESS NOTE ADULT - NUTRITIONAL ASSESSMENT
Diet, NPO with Tube Feed:   Tube Feeding Modality: Gastrostomy  Casie Frazier glucose support 1.2  Total Volume for 24 Hours (mL): 1200  Continuous  Starting Tube Feed Rate {mL per Hour}: 60  Increase Tube Feed Rate by (mL): 0  Until Goal Tube Feed Rate (mL per Hour): 60  Tube Feed Duration (in Hours): 20  Tube Feed Start Time: 06:00  Tube Feed Stop Time: 02:00  Free Water Flush  Pump   Rate (mL per Hour): 10   Frequency: Every 2 Hours  Alfred(7 Gm Arginine/7 Gm Glut/1.2 Gm HMB     Qty per Day:  2 (01-01-24 @ 08:00) [Active]      Please see RD assessment and/or follow up.  Managed by primary team as well

## 2024-01-09 NOTE — PROGRESS NOTE ADULT - NSPROGADDITIONALINFOA_GEN_ALL_CORE
-Plan discussed with team.  Contact info: [phone] (24/7). pager [phone]  Amion on [place]  Teams on M-T-W-F. Unavailable Thu/Weekends/Holidays  Assessed pt/labs/meds and discussed plan of care with primary team  Adjusting insulin  Discharge plan  Follow up care -Plan discussed with team.  Contact info: [phone] (24/7). pager [phone]  Amion on [place]  Teams on M-T-W-F. Unavailable Thu/Weekends/Holidays  Assessed pt/labs/meds and discussed plan of care with primary team  Adjusting insulin  Discharge plan  Follow up care

## 2024-01-09 NOTE — DISCHARGE NOTE NURSING/CASE MANAGEMENT/SOCIAL WORK - NSDCPEFALRISK_GEN_ALL_CORE
For information on Fall & Injury Prevention, visit: [email or web address]/[email or web address] OR  [email or web address] OR  https://www.cdc.gov/steadi/patient.html For information on Fall & Injury Prevention, visit: [email or web address]://[email or web address].[email or web address]/[email or web address]-[email or web address]-mobility OR  [email or web address]://[email or web address].[email or web address]-injury OR  https://www.cdc.gov/steadi/patient.html For information on Fall & Injury Prevention, visit: [email or web address]://www.[email or web address]/news/[email or web address]-[email or web address]-[email or web address]-health-[email or web address]-mobility OR  https://www.[email or web address]/[email or web address]-tips-to-avoid-injury OR  https://www.cdc.gov/steadi/patient.html

## 2024-01-09 NOTE — PROGRESS NOTE ADULT - NS ATTEND AMEND GEN_ALL_CORE FT
73F with a h/o DM, HTN, HLD, anemia, hypothyroidism, RA, fibromyalgia, remote cerebral aneurysm with repair, hypercapnic respiratory failure s/p tracheostomy/PEG, bedbound, sacral pressure ulcer. Admitted w/ bleeding from tracheostomy and PEG w/ associated abdominal pain, recent hx of auditory/visual hallucinations. Imaging showed mild thickening along PEG tube tract and sacral ulcer extending to coccyx suggestive of acute osteomyelitis.    Remains on chronic mech vent, tolerating PS trials during the day for periods of time but unable to fully wean and unfortunately she will continue to be ventilator dependent  Tolerating PEG feeds  Sacral OM s/p 6 week course of Cipro and Keflex as per ID - completed 12/10/23. Appreciate wound care follow up.   c/w current pain regimen - in setting of fibromyalgia and pain from wound.  Today daughter reports that patient is having bilateral shoulder pain and right arm pain.   Patient is pending US of right arm as well as should xray. May need CT scan if negative, patient with significant decreased ROM of right shoulder.   Agree with plan as outlined above. 73F with a h/o DM, HTN, HLD, anemia, hypothyroidism, RA, fibromyalgia, remote cerebral aneurysm with repair, hypercapnic respiratory failure s/p tracheostomy/PEG, bedbound, sacral pressure ulcer. Admitted w/ bleeding from tracheostomy and PEG w/ associated abdominal pain, recent hx of auditory/visual hallucinations. Imaging showed mild thickening along PEG tube tract and sacral ulcer extending to coccyx suggestive of acute osteomyelitis.    Remains on chronic mech vent, tolerating PS trials during the day for periods of time but unable to fully wean and unfortunately she will continue to be ventilator dependent  Tolerating PEG feeds  Sacral OM s/p 6 week course of Cipro and Keflex as per ID - completed 12/10/23. Appreciate wound care follow up.   Pain control - increase Neurontin and Oxycodone for right shoulder pain.  X-Ray with osteopenia, no fracture. UE dopplers negative.   Agree with plan as outlined above.  Discharge planning
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
## 2024-01-09 NOTE — GOALS OF CARE CONVERSATION - ADVANCED CARE PLANNING - CONVERSATION DETAILS
Met with patient and family at bedside for GOC discussion. Pt awake and alert but with difficulty communicating in setting of tracheostomy. Attempted to illicit patient's understanding of her current condition/wishes but pt became increasingly anxious and expressed that she did not want to have conversation at this time. While patient did attempt to communicate she is not able to demonstrate insight into disease process. Communication board at bedside but patient deferred further conversation. Daughter understands that conversations regarding GOC and code status are ongoing and can be continued in the community after discharge from hospital. Family declines hospice referral or de-escalation of care. At this time pt remains full code with continued medical management.  Case discussed with primary team attending and unit CM.   GaP team will sign off as goals are established.     Corin Soto MD  Geriatrics and Palliative Medicine Attending  [facility] pager: [phone] Met with patient and family at bedside for GOC discussion. Pt awake and alert but with difficulty communicating in setting of tracheostomy. Attempted to illicit patient's understanding of her current condition/wishes but pt became increasingly anxious and expressed that she did not want to have conversation at this time. While patient did attempt to communicate she is not able to demonstrate insight into disease process. Communication board at bedside but patient deferred further conversation. Daughter understands that conversations regarding GOC and code status are ongoing and can be continued in the community after discharge from hospital. Family declines hospice referral or de-escalation of care. At this time pt remains full code with continued medical management.  Case discussed with primary team attending and unit CM.   GaP team will sign off as goals are established.     Corin Soto MD  Geriatrics and Palliative Medicine Attending  [facility] pager: [phone]

## 2024-01-09 NOTE — PROGRESS NOTE ADULT - SUBJECTIVE AND OBJECTIVE BOX
Patient is a 73y old  Female who presents with a chief complaint of PEG Bleeding (22 Dec 2023 07:35)      Interval Events:    REVIEW OF SYSTEMS:  [ ] Positive  [ ] All other systems negative  [ ] Unable to assess ROS because ________    Vital Signs Last 24 Hrs  T(C): 36.9 (01-09-24 @ 06:12), Max: 36.9 (01-09-24 @ 06:12)  T(F): 98.5 (01-09-24 @ 06:12), Max: 98.5 (01-09-24 @ 06:12)  HR: 81 (01-09-24 @ 06:12) (72 - 98)  BP: 124/63 (01-09-24 @ 06:12) (103/50 - 135/63)  RR: 17 (01-09-24 @ 06:12) (16 - 18)  SpO2: 100% (01-09-24 @ 06:12) (94% - 100%)    PHYSICAL EXAM:  HEENT:   [ ]Tracheostomy:  [ ]Pupils equal  [ ]No oral lesions  [ ]Abnormal    SKIN  [ ]No Rash  [ ] Abnormal  [ ] pressure    CARDIAC  [ ]Regular  [ ]Abnormal    PULMONARY  [ ]Bilateral Clear Breath Sounds  [ ]Normal Excursion  [ ]Abnormal    GI  [ ]PEG      [ ] +BS		              [ ]Soft, nondistended, nontender	  [ ]Abnormal    MUSCULOSKELETAL                                   [ ]Bedbound                 [ ]Abnormal    [ ]Ambulatory/OOB to chair                           EXTREMITIES                                         [ ]Normal  [ ]Edema                           NEUROLOGIC  [ ] Normal, non focal  [ ] Focal findings:    PSYCHIATRIC  [ ]Alert and appropriate  [ ] Sedated	 [ ]Agitated    :  Mcbride: [ ] Yes, if yes: Date of Placement:                   [  ] No    LINES: Central Lines [ ] Yes, if yes: Date of Placement                                     [  ] No    HOSPITAL MEDICATIONS:  MEDICATIONS  (STANDING):  albuterol/ipratropium for Nebulization 3 milliLiter(s) Nebulizer every 8 hours  amLODIPine   Tablet 10 milliGRAM(s) Oral daily  artificial  tears Solution 2 Drop(s) Both EYES four times a day  ascorbic acid 500 milliGRAM(s) Oral daily  calcium carbonate    500 mG (Tums) Chewable 1 Tablet(s) Chew every 12 hours  chlorhexidine 0.12% Liquid 15 milliLiter(s) Oral Mucosa every 12 hours  chlorhexidine 4% Liquid 1 Application(s) Topical <User Schedule>  dextrose 50% Injectable 12.5 Gram(s) IV Push once  dextrose 50% Injectable 25 Gram(s) IV Push once  escitalopram 10 milliGRAM(s) Oral <User Schedule>  fentaNYL   Patch  25 MICROgram(s)/Hr 1 Patch Transdermal every 72 hours  gabapentin Solution 100 milliGRAM(s) Enteral Tube at bedtime  glucagon  Injectable 1 milliGRAM(s) IntraMuscular once  hemorrhoidal Ointment 1 Application(s) Rectal daily  hemorrhoidal Ointment      insulin glargine Injectable (LANTUS) 19 Unit(s) SubCutaneous every morning  insulin regular  human corrective regimen sliding scale   SubCutaneous every 6 hours  insulin regular  human recombinant 9 Unit(s) SubCutaneous <User Schedule>  insulin regular  human recombinant 34 Unit(s) SubCutaneous <User Schedule>  insulin regular  human recombinant 17 Unit(s) SubCutaneous <User Schedule>  levothyroxine 125 MICROGram(s) Oral <User Schedule>  lidocaine   4% Patch 1 Patch Transdermal daily  lidocaine   4% Patch 1 Patch Transdermal daily  lidocaine 2% Viscous 5 milliLiter(s) Mucosal two times a day  melatonin 1 milliGRAM(s) Oral at bedtime  melatonin 5 milliGRAM(s) Oral at bedtime  multivitamin/minerals/iron Oral Solution (CENTRUM) 15 milliLiter(s) Oral daily  nystatin Powder 1 Application(s) Topical every 8 hours  pantoprazole   Suspension 40 milliGRAM(s) Enteral Tube <User Schedule>  petrolatum Ophthalmic Ointment 1 Application(s) Both EYES four times a day  predniSONE   Tablet 5 milliGRAM(s) Oral daily  QUEtiapine 12.5 milliGRAM(s) Oral <User Schedule>  silver nitrate Applicator 1 Application(s) Topical once  simethicone 80 milliGRAM(s) Chew four times a day  zinc oxide 40% Paste 1 Application(s) Topical daily    MEDICATIONS  (PRN):  acetaminophen   Oral Liquid .. 1000 milliGRAM(s) Enteral Tube every 6 hours PRN Temp greater or equal to 38C (100.4F), Mild Pain (1 - 3)  benzocaine 20% Spray 1 Spray(s) Topical four times a day PRN Cough  diclofenac sodium 1% Gel 2 Gram(s) Topical four times a day PRN pain  diphenhydrAMINE Elixir 25 milliGRAM(s) Oral every 6 hours PRN Rash and/or Itching  guaifenesin/dextromethorphan Oral Liquid 10 milliLiter(s) Oral every 6 hours PRN Cough  oxyCODONE    Solution 2.5 milliGRAM(s) Oral every 6 hours PRN Moderate Pain (4 - 6)  sodium chloride 0.65% Nasal 1 Spray(s) Both Nostrils every 6 hours PRN Nasal Congestion      LABS:                  CAPILLARY BLOOD GLUCOSE    MICROBIOLOGY:     RADIOLOGY:  [ ] Reviewed and interpreted by me    Mode: AC/ CMV (Assist Control/ Continuous Mandatory Ventilation)  RR (machine): 12  TV (machine): 300  FiO2: 30  PEEP: 5  ITime: 1  MAP: 11  PIP: 28   Patient is a 73y old  Female who presents with a chief complaint of PEG Bleeding (22 Dec 2023 07:35)      Interval Events:  No acute events overnight.     REVIEW OF SYSTEMS:  [ ] Positive  [X] All other systems negative  [ ] Unable to assess ROS because ________    Vital Signs Last 24 Hrs  T(C): 36.9 (01-09-24 @ 06:12), Max: 36.9 (01-09-24 @ 06:12)  T(F): 98.5 (01-09-24 @ 06:12), Max: 98.5 (01-09-24 @ 06:12)  HR: 81 (01-09-24 @ 06:12) (72 - 98)  BP: 124/63 (01-09-24 @ 06:12) (103/50 - 135/63)  RR: 17 (01-09-24 @ 06:12) (16 - 18)  SpO2: 100% (01-09-24 @ 06:12) (94% - 100%)    PHYSICAL EXAM:  HEENT:   [X]Tracheostomy: #8 distal XLT [place]  [X]Pupils equal  [ ]No oral lesions  [X] Abnormal: missing dentition; +teeth caries; +loose bottom teeth    SKIN  [ ]No Rash  [ ] Abnormal  [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    CARDIAC  [X]Regular  [ ]Abnormal    PULMONARY  [X]Bilateral Clear Breath Sounds  [ ]Normal Excursion  [ ]Abnormal    GI  [X]PEG      [X] +BS		              [X]Soft, nondistended, nontender	  [ ]Abnormal    MUSCULOSKELETAL                                   [X]Bedbound                 [ ]Abnormal    [ ]Ambulatory/OOB to chair                           EXTREMITIES                                         [X]Normal  [ ]Edema                           NEUROLOGIC  [ ] Normal, non focal  [X] Focal findings: opens eyes spontaneously, follows commands on all 4 extremities, able to mouth some words/express her needs at times    PSYCHIATRIC  [X]Alert and appropriate  [ ] Sedated	 [ ]Agitated    :  Mcbride: [ ] Yes, if yes: Date of Placement:                   [X] No    LINES: Central Lines [ ] Yes, if yes: Date of Placement                                     [X] No    HOSPITAL MEDICATIONS:  MEDICATIONS  (STANDING):  albuterol/ipratropium for Nebulization 3 milliLiter(s) Nebulizer every 8 hours  amLODIPine   Tablet 10 milliGRAM(s) Oral daily  artificial  tears Solution 2 Drop(s) Both EYES four times a day  ascorbic acid 500 milliGRAM(s) Oral daily  calcium carbonate    500 mG (Tums) Chewable 1 Tablet(s) Chew every 12 hours  chlorhexidine 0.12% Liquid 15 milliLiter(s) Oral Mucosa every 12 hours  chlorhexidine 4% Liquid 1 Application(s) Topical <User Schedule>  dextrose 50% Injectable 12.5 Gram(s) IV Push once  dextrose 50% Injectable 25 Gram(s) IV Push once  escitalopram 10 milliGRAM(s) Oral <User Schedule>  fentaNYL   Patch  25 MICROgram(s)/Hr 1 Patch Transdermal every 72 hours  gabapentin Solution 100 milliGRAM(s) Enteral Tube at bedtime  glucagon  Injectable 1 milliGRAM(s) IntraMuscular once  hemorrhoidal Ointment 1 Application(s) Rectal daily  hemorrhoidal Ointment      insulin glargine Injectable (LANTUS) 19 Unit(s) SubCutaneous every morning  insulin regular  human corrective regimen sliding scale   SubCutaneous every 6 hours  insulin regular  human recombinant 9 Unit(s) SubCutaneous <User Schedule>  insulin regular  human recombinant 34 Unit(s) SubCutaneous <User Schedule>  insulin regular  human recombinant 17 Unit(s) SubCutaneous <User Schedule>  levothyroxine 125 MICROGram(s) Oral <User Schedule>  lidocaine   4% Patch 1 Patch Transdermal daily  lidocaine   4% Patch 1 Patch Transdermal daily  lidocaine 2% Viscous 5 milliLiter(s) Mucosal two times a day  melatonin 1 milliGRAM(s) Oral at bedtime  melatonin 5 milliGRAM(s) Oral at bedtime  multivitamin/minerals/iron Oral Solution (CENTRUM) 15 milliLiter(s) Oral daily  nystatin Powder 1 Application(s) Topical every 8 hours  pantoprazole   Suspension 40 milliGRAM(s) Enteral Tube <User Schedule>  petrolatum Ophthalmic Ointment 1 Application(s) Both EYES four times a day  predniSONE   Tablet 5 milliGRAM(s) Oral daily  QUEtiapine 12.5 milliGRAM(s) Oral <User Schedule>  silver nitrate Applicator 1 Application(s) Topical once  simethicone 80 milliGRAM(s) Chew four times a day  zinc oxide 40% Paste 1 Application(s) Topical daily    MEDICATIONS  (PRN):  acetaminophen   Oral Liquid .. 1000 milliGRAM(s) Enteral Tube every 6 hours PRN Temp greater or equal to 38C (100.4F), Mild Pain (1 - 3)  benzocaine 20% Spray 1 Spray(s) Topical four times a day PRN Cough  diclofenac sodium 1% Gel 2 Gram(s) Topical four times a day PRN pain  diphenhydrAMINE Elixir 25 milliGRAM(s) Oral every 6 hours PRN Rash and/or Itching  guaifenesin/dextromethorphan Oral Liquid 10 milliLiter(s) Oral every 6 hours PRN Cough  oxyCODONE    Solution 2.5 milliGRAM(s) Oral every 6 hours PRN Moderate Pain (4 - 6)  sodium chloride 0.65% Nasal 1 Spray(s) Both Nostrils every 6 hours PRN Nasal Congestion    LABS:      CAPILLARY BLOOD GLUCOSE    MICROBIOLOGY:     RADIOLOGY:  [ ] Reviewed and interpreted by me    Mode: AC/ CMV (Assist Control/ Continuous Mandatory Ventilation)  RR (machine): 12  TV (machine): 300  FiO2: 30  PEEP: 5  ITime: 1  MAP: 11  PIP: 28

## 2024-01-09 NOTE — PROGRESS NOTE ADULT - PROBLEM SELECTOR PLAN 7
- Continue home Prednisone regimen 5 mg daily   *fibromyalgia  - continue home Gabapentin 100mg daily  - continue home Fentanyl 25mcg Q72H patches  - c/w Lidocaine patch   - Oxycodone 2.5mg q6 hrs PRN (in addition to Tylenol PRN)  - 1/7-1/8 Right forearm and BL shoulder pain - w/u negative for DVT, imaging negative for acute fracture/dislocation, will continue with pain management as needed with meds above - Continue home Prednisone regimen 5 mg daily   *fibromyalgia  - continue home Gabapentin 100mg daily  - continue home Fentanyl 25mcg Q72H patches  - c/w Lidocaine patch   - Oxycodone 2.5mg q6 hrs PRN (in addition to Tylenol PRN)  - 1/7-1/8 Right forearm and BL shoulder pain - w/u negative for DVT, imaging negative for acute fracture/dislocation, will continue with pain management as needed with meds above  - 1/9 increased gabapentin to 250mg PO - Continue home Prednisone regimen 5 mg daily   *fibromyalgia  - continue home Gabapentin 100mg daily  - continue home Fentanyl 25mcg Q72H patches  - c/w Lidocaine patch   - Oxycodone 2.5mg q6 hrs PRN (in addition to Tylenol PRN)  - 1/7-1/8 Right forearm and BL shoulder pain - w/u negative for DVT, imaging negative for acute fracture/dislocation, will continue with pain management as needed with meds above  - 1/9 increased gabapentin to 250mg PO, oxycodone increased to 5mg

## 2024-01-09 NOTE — PROGRESS NOTE ADULT - PROBLEM SELECTOR PLAN 3
Thyroid Function Tests:  01-09 @ 06:46 TSH 1.66 FreeT4 1.4 T3 -- Anti TPO -- Anti Thyroglobulin Ab -- TSI --  - continue levothyroxine 125mcg daily  - Please make sure LT4 is given on an empty stomach at least 30 mins to 1 hour apart from other meds and food/TFs to ensure med absorption. DO NOT GIVE WITH VITAMINS/ANTACIDS.

## 2024-01-09 NOTE — PROGRESS NOTE ADULT - PROBLEM SELECTOR PLAN 11
GOC: Long discussion of RCU team with daughter, pt remains FULL CODE  - 1/5 - Dr Roman explained to pt's daughter re: complications with long tracheostomy dependance. Pt's daughter is concerned that pt has been failing PMV's trials.

## 2024-01-09 NOTE — PROGRESS NOTE ADULT - TIME BILLING
Reviewing the EMR, vitals, imaging, medication list, recent labs, prior records and coordinating care with medical providers. This time excludes procedures and teaching. Reviewing the EMR, vitals, imaging, medication list, recent labs, prior records and coordinating care with medical providers.

## 2024-01-09 NOTE — PROGRESS NOTE ADULT - PROBLEM SELECTOR PLAN 2
- Due to chronic steroid use pt is at risk of tertiary AI, please do not abruptly discontinue or hold steroids as this can result in an adrenal crisis   - c/w prednisone 5mg daily  - hemodynamically stable at present time.

## 2024-01-10 LAB
GLUCOSE BLDC GLUCOMTR-MCNC: 138 MG/DL — HIGH (ref 70–99)
GLUCOSE BLDC GLUCOMTR-MCNC: 138 MG/DL — HIGH (ref 70–99)
GLUCOSE BLDC GLUCOMTR-MCNC: 153 MG/DL — HIGH (ref 70–99)
GLUCOSE BLDC GLUCOMTR-MCNC: 153 MG/DL — HIGH (ref 70–99)
GLUCOSE BLDC GLUCOMTR-MCNC: 192 MG/DL — HIGH (ref 70–99)
GLUCOSE BLDC GLUCOMTR-MCNC: 192 MG/DL — HIGH (ref 70–99)
GLUCOSE BLDC GLUCOMTR-MCNC: 207 MG/DL — HIGH (ref 70–99)
GLUCOSE BLDC GLUCOMTR-MCNC: 207 MG/DL — HIGH (ref 70–99)

## 2024-01-10 PROCEDURE — 99232 SBSQ HOSP IP/OBS MODERATE 35: CPT

## 2024-01-10 PROCEDURE — 99233 SBSQ HOSP IP/OBS HIGH 50: CPT

## 2024-01-10 RX ORDER — INSULIN HUMAN 100 [IU]/ML
36 INJECTION, SOLUTION SUBCUTANEOUS
Refills: 0 | Status: DISCONTINUED | OUTPATIENT
Start: 2024-01-11 | End: 2024-01-11

## 2024-01-10 RX ORDER — INSULIN LISPRO 100/ML
VIAL (ML) SUBCUTANEOUS EVERY 6 HOURS
Refills: 0 | Status: DISCONTINUED | OUTPATIENT
Start: 2024-01-10 | End: 2024-01-17

## 2024-01-10 RX ORDER — INSULIN GLARGINE 100 [IU]/ML
19 INJECTION, SOLUTION SUBCUTANEOUS
Refills: 0 | Status: DISCONTINUED | OUTPATIENT
Start: 2024-01-10 | End: 2024-01-11

## 2024-01-10 RX ORDER — LIDOCAINE 4 G/100G
1 CREAM TOPICAL DAILY
Refills: 0 | Status: DISCONTINUED | OUTPATIENT
Start: 2024-01-10 | End: 2024-01-11

## 2024-01-10 RX ORDER — GABAPENTIN 400 MG/1
100 CAPSULE ORAL
Refills: 0 | Status: DISCONTINUED | OUTPATIENT
Start: 2024-01-10 | End: 2024-01-11

## 2024-01-10 RX ADMIN — ZINC OXIDE 1 APPLICATION(S): 200 OINTMENT TOPICAL at 11:45

## 2024-01-10 RX ADMIN — SIMETHICONE 80 MILLIGRAM(S): 80 TABLET, CHEWABLE ORAL at 11:44

## 2024-01-10 RX ADMIN — Medication 3 MILLILITER(S): at 06:12

## 2024-01-10 RX ADMIN — Medication 1 APPLICATION(S): at 11:45

## 2024-01-10 RX ADMIN — Medication 5 MILLIGRAM(S): at 23:43

## 2024-01-10 RX ADMIN — PANTOPRAZOLE SODIUM 40 MILLIGRAM(S): 20 TABLET, DELAYED RELEASE ORAL at 08:40

## 2024-01-10 RX ADMIN — INSULIN HUMAN 1: 100 INJECTION, SOLUTION SUBCUTANEOUS at 11:47

## 2024-01-10 RX ADMIN — SIMETHICONE 80 MILLIGRAM(S): 80 TABLET, CHEWABLE ORAL at 23:43

## 2024-01-10 RX ADMIN — LIDOCAINE 5 MILLILITER(S): 4 CREAM TOPICAL at 06:12

## 2024-01-10 RX ADMIN — LIDOCAINE 1 PATCH: 4 CREAM TOPICAL at 11:48

## 2024-01-10 RX ADMIN — Medication 2 DROP(S): at 23:46

## 2024-01-10 RX ADMIN — Medication 2 DROP(S): at 00:09

## 2024-01-10 RX ADMIN — LIDOCAINE 1 PATCH: 4 CREAM TOPICAL at 23:00

## 2024-01-10 RX ADMIN — Medication 1 TABLET(S): at 06:13

## 2024-01-10 RX ADMIN — LIDOCAINE 1 PATCH: 4 CREAM TOPICAL at 18:44

## 2024-01-10 RX ADMIN — GABAPENTIN 100 MILLIGRAM(S): 400 CAPSULE ORAL at 13:38

## 2024-01-10 RX ADMIN — Medication 3 MILLILITER(S): at 23:37

## 2024-01-10 RX ADMIN — Medication 1 APPLICATION(S): at 06:12

## 2024-01-10 RX ADMIN — QUETIAPINE FUMARATE 12.5 MILLIGRAM(S): 200 TABLET, FILM COATED ORAL at 18:10

## 2024-01-10 RX ADMIN — Medication 2 DROP(S): at 11:46

## 2024-01-10 RX ADMIN — CHLORHEXIDINE GLUCONATE 1 APPLICATION(S): 213 SOLUTION TOPICAL at 06:13

## 2024-01-10 RX ADMIN — OXYCODONE HYDROCHLORIDE 5 MILLIGRAM(S): 5 TABLET ORAL at 09:14

## 2024-01-10 RX ADMIN — Medication 2 DROP(S): at 18:12

## 2024-01-10 RX ADMIN — Medication 1 TABLET(S): at 18:11

## 2024-01-10 RX ADMIN — SIMETHICONE 80 MILLIGRAM(S): 80 TABLET, CHEWABLE ORAL at 00:09

## 2024-01-10 RX ADMIN — Medication 2 DROP(S): at 06:12

## 2024-01-10 RX ADMIN — ESCITALOPRAM OXALATE 10 MILLIGRAM(S): 10 TABLET, FILM COATED ORAL at 08:40

## 2024-01-10 RX ADMIN — CHLORHEXIDINE GLUCONATE 15 MILLILITER(S): 213 SOLUTION TOPICAL at 06:12

## 2024-01-10 RX ADMIN — OXYCODONE HYDROCHLORIDE 5 MILLIGRAM(S): 5 TABLET ORAL at 10:14

## 2024-01-10 RX ADMIN — AMLODIPINE BESYLATE 10 MILLIGRAM(S): 2.5 TABLET ORAL at 06:13

## 2024-01-10 RX ADMIN — NYSTATIN CREAM 1 APPLICATION(S): 100000 CREAM TOPICAL at 23:47

## 2024-01-10 RX ADMIN — Medication 1 APPLICATION(S): at 18:12

## 2024-01-10 RX ADMIN — NYSTATIN CREAM 1 APPLICATION(S): 100000 CREAM TOPICAL at 13:38

## 2024-01-10 RX ADMIN — SIMETHICONE 80 MILLIGRAM(S): 80 TABLET, CHEWABLE ORAL at 18:20

## 2024-01-10 RX ADMIN — INSULIN HUMAN 1: 100 INJECTION, SOLUTION SUBCUTANEOUS at 06:23

## 2024-01-10 RX ADMIN — INSULIN HUMAN 17 UNIT(S): 100 INJECTION, SOLUTION SUBCUTANEOUS at 11:48

## 2024-01-10 RX ADMIN — INSULIN GLARGINE 19 UNIT(S): 100 INJECTION, SOLUTION SUBCUTANEOUS at 08:40

## 2024-01-10 RX ADMIN — INSULIN HUMAN 9 UNIT(S): 100 INJECTION, SOLUTION SUBCUTANEOUS at 18:13

## 2024-01-10 RX ADMIN — Medication 5 MILLIGRAM(S): at 06:13

## 2024-01-10 RX ADMIN — LIDOCAINE 5 MILLILITER(S): 4 CREAM TOPICAL at 18:10

## 2024-01-10 RX ADMIN — Medication 1 MILLIGRAM(S): at 23:43

## 2024-01-10 RX ADMIN — PHENYLEPHRINE-SHARK LIVER OIL-MINERAL OIL-PETROLATUM RECTAL OINTMENT 1 APPLICATION(S): at 11:45

## 2024-01-10 RX ADMIN — INSULIN HUMAN 34 UNIT(S): 100 INJECTION, SOLUTION SUBCUTANEOUS at 06:23

## 2024-01-10 RX ADMIN — SIMETHICONE 80 MILLIGRAM(S): 80 TABLET, CHEWABLE ORAL at 06:13

## 2024-01-10 RX ADMIN — Medication 500 MILLIGRAM(S): at 11:44

## 2024-01-10 RX ADMIN — Medication 1 APPLICATION(S): at 00:09

## 2024-01-10 RX ADMIN — LIDOCAINE 1 PATCH: 4 CREAM TOPICAL at 11:49

## 2024-01-10 RX ADMIN — Medication 1 APPLICATION(S): at 23:46

## 2024-01-10 RX ADMIN — CHLORHEXIDINE GLUCONATE 15 MILLILITER(S): 213 SOLUTION TOPICAL at 18:11

## 2024-01-10 RX ADMIN — GABAPENTIN 250 MILLIGRAM(S): 400 CAPSULE ORAL at 23:46

## 2024-01-10 RX ADMIN — Medication 125 MICROGRAM(S): at 06:22

## 2024-01-10 RX ADMIN — Medication 15 MILLILITER(S): at 11:46

## 2024-01-10 RX ADMIN — OXYCODONE HYDROCHLORIDE 5 MILLIGRAM(S): 5 TABLET ORAL at 23:46

## 2024-01-10 RX ADMIN — NYSTATIN CREAM 1 APPLICATION(S): 100000 CREAM TOPICAL at 06:14

## 2024-01-10 RX ADMIN — Medication 3 MILLILITER(S): at 15:22
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
## 2024-01-10 NOTE — PROGRESS NOTE ADULT - TIME BILLING
Reviewing the EMR, vitals, imaging, medication list, recent labs, prior records and coordinating care with medical providers.

## 2024-01-10 NOTE — PROGRESS NOTE ADULT - SUBJECTIVE AND OBJECTIVE BOX
DIABETES FOLLOW UP NOTE: Saw pt earlier today    Chief Complaint: Endocrine consult requested for management of T2DM    INTERVAL HX: Pt stable, alert, sleeping at time of visit. Per staff, pt tolerating TFs of [employer] glucose support 1.2 at 60cc/hr from 6am to 2am with BG levels mostly at goal without hypoglycemia. On Prednisone 5mg with BG high 100s to 200s after taking Prednisone in am      Review of Systems:  General: As above  Unable      Allergies    penicillin (Unknown)  heparin (Unknown)  Tagamet (Unknown)  pineapple (Unknown)  walnut (Unknown)  Pecan, Filbert, Hazelnut (Unknown)  Lyrica (Unknown)    Intolerances      MEDICATIONS:  insulin glargine Injectable (LANTUS) 19 Unit(s) SubCutaneous <User Schedule>  insulin regular  human corrective regimen sliding scale   SubCutaneous every 6 hours  insulin regular  human recombinant 9 Unit(s) SubCutaneous <User Schedule>  insulin regular  human recombinant 34 Unit(s) SubCutaneous <User Schedule>  insulin regular  human recombinant 17 Unit(s) SubCutaneous <User Schedule>  levothyroxine 125 MICROGram(s) Oral <User Schedule>  predniSONE   Tablet 5 milliGRAM(s) Oral daily      PHYSICAL EXAM:  VITALS: T(C): 36.3 (01-10-24 @ 11:35)  T(F): 97.4 (01-10-24 @ 11:35), Max: 98.2 (01-09-24 @ 17:33)  HR: 77 (01-10-24 @ 15:35) (76 - 97)  BP: 138/63 (01-10-24 @ 13:59) (116/62 - 141/84)  RR:  (13 - 26)  SpO2:  (98% - 100%)  Wt(kg): --  GENERAL: Female laying in bed in NAD. HHA at bedside  HEENT: Trach in place D&I  Abdomen: Soft, nontender, non distended. PEG in place with TFs going at 60cc/hr at time of visit  Extremities: Warm, mild edema in feet and hands   NEURO: Sleeping    LABS:  POCT Blood Glucose.: 192 mg/dL (01-10-24 @ 11:32)  POCT Blood Glucose.: 207 mg/dL (01-10-24 @ 08:38)  POCT Blood Glucose.: 153 mg/dL (01-10-24 @ 05:41)  POCT Blood Glucose.: 139 mg/dL (01-09-24 @ 23:48)  POCT Blood Glucose.: 186 mg/dL (01-09-24 @ 17:16)  POCT Blood Glucose.: 189 mg/dL (01-09-24 @ 12:02)  POCT Blood Glucose.: 161 mg/dL (01-09-24 @ 08:24)  POCT Blood Glucose.: 147 mg/dL (01-09-24 @ 05:24)  POCT Blood Glucose.: 178 mg/dL (01-08-24 @ 23:45)  POCT Blood Glucose.: 167 mg/dL (01-08-24 @ 17:17)  POCT Blood Glucose.: 229 mg/dL (01-08-24 @ 11:56)  POCT Blood Glucose.: 128 mg/dL (01-08-24 @ 08:24)  POCT Blood Glucose.: 184 mg/dL (01-08-24 @ 06:01)  POCT Blood Glucose.: 142 mg/dL (01-07-24 @ 23:40)  POCT Blood Glucose.: 165 mg/dL (01-07-24 @ 17:28)        Thyroid Function Tests:  01-09 @ 06:46 TSH 1.66 FreeT4 1.4 T3 -- Anti TPO -- Anti Thyroglobulin Ab -- TSI --      A1C with Estimated Average Glucose Result: 7.5 % (10-28-23 @ 07:18)      Estimated Average Glucose: 169 mg/dL (10-28-23 @ 07:18)        11-25 Chol -- Direct LDL -- LDL calculated -- HDL -- Trig 192<H>                 DIABETES FOLLOW UP NOTE: Saw pt earlier today    Chief Complaint: Endocrine consult requested for management of T2DM    INTERVAL HX: Pt stable, alert, sleeping at time of visit. Per staff, pt tolerating TFs of [employer] glucose support 1.2 at 60cc/hr from 6am to 2am with BG levels mostly at goal without hypoglycemia. On Prednisone 5mg with BG high 100s to 200s after taking Prednisone in am      Review of Systems:  General: As above  Unable      Allergies    penicillin (Unknown)  heparin (Unknown)  Tagamet (Unknown)  pineapple (Unknown)  walnut (Unknown)  Pecan, Filbert, Hazelnut (Unknown)  Lyrica (Unknown)    Intolerances      MEDICATIONS:  insulin glargine Injectable (LANTUS) 19 Unit(s) SubCutaneous <User Schedule>  insulin regular  human corrective regimen sliding scale   SubCutaneous every 6 hours  insulin regular  human recombinant 9 Unit(s) SubCutaneous <User Schedule>  insulin regular  human recombinant 34 Unit(s) SubCutaneous <User Schedule>  insulin regular  human recombinant 17 Unit(s) SubCutaneous <User Schedule>  levothyroxine 125 MICROGram(s) Oral <User Schedule>  predniSONE   Tablet 5 milliGRAM(s) Oral daily      PHYSICAL EXAM:  VITALS: T(C): 36.3 (01-10-24 @ 11:35)  T(F): 97.4 (01-10-24 @ 11:35), Max: 98.2 (01-09-24 @ 17:33)  HR: 77 (01-10-24 @ 15:35) (76 - 97)  BP: 138/63 (01-10-24 @ 13:59) (116/62 - 141/84)  RR:  (13 - 26)  SpO2:  (98% - 100%)  Wt(kg): --  GENERAL: Female laying in bed in NAD. HHA at bedside  HEENT: Trach in place D&I  Abdomen: Soft, nontender, non distended. PEG in place with TFs going at 60cc/hr at time of visit  Extremities: Warm, mild edema in feet and hands   NEURO: Sleeping    LABS:  POCT Blood Glucose.: 192 mg/dL (01-10-24 @ 11:32)  POCT Blood Glucose.: 207 mg/dL (01-10-24 @ 08:38)  POCT Blood Glucose.: 153 mg/dL (01-10-24 @ 05:41)  POCT Blood Glucose.: 139 mg/dL (01-09-24 @ 23:48)  POCT Blood Glucose.: 186 mg/dL (01-09-24 @ 17:16)  POCT Blood Glucose.: 189 mg/dL (01-09-24 @ 12:02)  POCT Blood Glucose.: 161 mg/dL (01-09-24 @ 08:24)  POCT Blood Glucose.: 147 mg/dL (01-09-24 @ 05:24)  POCT Blood Glucose.: 178 mg/dL (01-08-24 @ 23:45)  POCT Blood Glucose.: 167 mg/dL (01-08-24 @ 17:17)  POCT Blood Glucose.: 229 mg/dL (01-08-24 @ 11:56)  POCT Blood Glucose.: 128 mg/dL (01-08-24 @ 08:24)  POCT Blood Glucose.: 184 mg/dL (01-08-24 @ 06:01)  POCT Blood Glucose.: 142 mg/dL (01-07-24 @ 23:40)  POCT Blood Glucose.: 165 mg/dL (01-07-24 @ 17:28)        Thyroid Function Tests:  01-09 @ 06:46 TSH 1.66 FreeT4 1.4 T3 -- Anti TPO -- Anti Thyroglobulin Ab -- TSI --      A1C with Estimated Average Glucose Result: 7.5 % (10-28-23 @ 07:18)      Estimated Average Glucose: 169 mg/dL (10-28-23 @ 07:18)        11-25 Chol -- Direct LDL -- LDL calculated -- HDL -- Trig 192<H>

## 2024-01-10 NOTE — PROGRESS NOTE ADULT - ASSESSMENT
74 y/o F w/h/o T2DM with unknown control due to anemia of chronic disease skewing A1C levels. On Levemir and Humalog insulin PTA. Unknown DM complications. Also h/o HTN, HLD, anemia, hypothyroidism, RA, fibromyalgia, remote cerebral aneurysm repair, acute hypercapnic respiratory failure now with tracheostomy, PEG tube, and bedbound (trach change 8/18, PEG change 8/14), sacral pressure ulcer, BIBEMS from home for 6 day hx of bleeding from the tracheostomy and PEG tube with associated abdominal pain> now resolved. Endocrinology consulted for hyperglycemia. Tolerating TFs with BG mostly at goal except after taking Prednsione in am. Will adjust 6am dosing to keep BG Goal 100-180mg/dl. No hypoglycemia.                              72 y/o F w/h/o T2DM with unknown control due to anemia of chronic disease skewing A1C levels. On Levemir and Humalog insulin PTA. Unknown DM complications. Also h/o HTN, HLD, anemia, hypothyroidism, RA, fibromyalgia, remote cerebral aneurysm repair, acute hypercapnic respiratory failure now with tracheostomy, PEG tube, and bedbound (trach change 8/18, PEG change 8/14), sacral pressure ulcer, BIBEMS from home for 6 day hx of bleeding from the tracheostomy and PEG tube with associated abdominal pain> now resolved. Endocrinology consulted for hyperglycemia. Tolerating TFs with BG mostly at goal except after taking Prednsione in am. Will adjust 6am dosing to keep BG Goal 100-180mg/dl. No hypoglycemia.

## 2024-01-10 NOTE — PROGRESS NOTE ADULT - PROBLEM SELECTOR PLAN 1
- FS BG monitoring [id number] while on TFs/NPO   - Continue lantus 18 units sc qAM   - Adjust Regular insulin to36 units sc at 6am, c/w 17 units sc at 12 noon and 9 units sc at 6pm. PLEASE DO NOT HOLD IF PATIENT IS ON TUBE FEEDS (hold only if TF are stopped). Can decrease/adjust dose if there is a concern for hypoglycemia.   - C/w low dose admelog correction scale every 6 hours  - Will follow and adjust insulin as needed  DISCHARGE:  - Will be determined according to BG/insulin needs/TFs and steroid therapy at time of discharge. If discharge within next 24 hours will continue with same insulin types and doses as noted above with the exception of Admelog correction scale.   - Needs telemedicine as outpatient due to bedbound status. Can follow up at [facility] practice. [address] Phone .   -Make sure pt has Rx for all DM supplies and insulin. - FS BG monitoring [id number] while on TFs/NPO   - Continue lantus 18 units sc qAM   - Adjust Regular insulin to36 units sc at 6am, c/w 17 units sc at 12 noon and 9 units sc at 6pm. PLEASE DO NOT HOLD IF PATIENT IS ON TUBE FEEDS (hold only if TF are stopped). Can decrease/adjust dose if there is a concern for hypoglycemia.   - C/w low dose admelog correction scale every 6 hours  - Will follow and adjust insulin as needed  DISCHARGE:  - Will be determined according to BG/insulin needs/TFs and steroid therapy at time of discharge. If discharge within next 24 hours will continue with same insulin types and doses as noted above with the exception of Admelog correction scale.   - Needs telemedicine as outpatient due to bedbound status. Can follow up at [facility] practice. [address] Phone .   -Make sure pt has Rx for all DM supplies and insulin.

## 2024-01-10 NOTE — PROGRESS NOTE ADULT - SUBJECTIVE AND OBJECTIVE BOX
Patient is a 74y old  Female who presents with a chief complaint of Bleeding from PEG (09 Jan 2024 15:37)      Interval Events:    REVIEW OF SYSTEMS:  [ ] Positive  [ ] All other systems negative  [ ] Unable to assess ROS because ________    Vital Signs Last 24 Hrs  T(C): 36.6 (01-10-24 @ 04:35), Max: 37 (01-09-24 @ 13:21)  T(F): 97.8 (01-10-24 @ 04:35), Max: 98.6 (01-09-24 @ 13:21)  HR: 88 (01-10-24 @ 06:08) (75 - 111)  BP: 141/76 (01-10-24 @ 04:35) (116/62 - 151/79)  RR: 19 (01-10-24 @ 04:35) (13 - 19)  SpO2: 100% (01-10-24 @ 06:08) (98% - 100%)    PHYSICAL EXAM:  HEENT:   [ ]Tracheostomy:  [ ]Pupils equal  [ ]No oral lesions  [ ]Abnormal    SKIN  [ ]No Rash  [ ] Abnormal  [ ] pressure    CARDIAC  [ ]Regular  [ ]Abnormal    PULMONARY  [ ]Bilateral Clear Breath Sounds  [ ]Normal Excursion  [ ]Abnormal    GI  [ ]PEG      [ ] +BS		              [ ]Soft, nondistended, nontender	  [ ]Abnormal    MUSCULOSKELETAL                                   [ ]Bedbound                 [ ]Abnormal    [ ]Ambulatory/OOB to chair                           EXTREMITIES                                         [ ]Normal  [ ]Edema                           NEUROLOGIC  [ ] Normal, non focal  [ ] Focal findings:    PSYCHIATRIC  [ ]Alert and appropriate  [ ] Sedated	 [ ]Agitated    :  Mcbride: [ ] Yes, if yes: Date of Placement:                   [  ] No    LINES: Central Lines [ ] Yes, if yes: Date of Placement                                     [  ] No    HOSPITAL MEDICATIONS:  MEDICATIONS  (STANDING):  albuterol/ipratropium for Nebulization 3 milliLiter(s) Nebulizer every 8 hours  amLODIPine   Tablet 10 milliGRAM(s) Oral daily  artificial  tears Solution 2 Drop(s) Both EYES four times a day  ascorbic acid 500 milliGRAM(s) Oral daily  calcium carbonate    500 mG (Tums) Chewable 1 Tablet(s) Chew every 12 hours  chlorhexidine 0.12% Liquid 15 milliLiter(s) Oral Mucosa every 12 hours  chlorhexidine 4% Liquid 1 Application(s) Topical <User Schedule>  dextrose 50% Injectable 12.5 Gram(s) IV Push once  dextrose 50% Injectable 25 Gram(s) IV Push once  escitalopram 10 milliGRAM(s) Oral <User Schedule>  gabapentin Solution 250 milliGRAM(s) Enteral Tube at bedtime  glucagon  Injectable 1 milliGRAM(s) IntraMuscular once  hemorrhoidal Ointment      hemorrhoidal Ointment 1 Application(s) Rectal daily  insulin glargine Injectable (LANTUS) 19 Unit(s) SubCutaneous every morning  insulin regular  human corrective regimen sliding scale   SubCutaneous every 6 hours  insulin regular  human recombinant 9 Unit(s) SubCutaneous <User Schedule>  insulin regular  human recombinant 34 Unit(s) SubCutaneous <User Schedule>  insulin regular  human recombinant 17 Unit(s) SubCutaneous <User Schedule>  levothyroxine 125 MICROGram(s) Oral <User Schedule>  lidocaine   4% Patch 1 Patch Transdermal daily  lidocaine   4% Patch 1 Patch Transdermal daily  lidocaine 2% Viscous 5 milliLiter(s) Mucosal two times a day  melatonin 5 milliGRAM(s) Oral at bedtime  melatonin 1 milliGRAM(s) Oral at bedtime  multivitamin/minerals/iron Oral Solution (CENTRUM) 15 milliLiter(s) Oral daily  nystatin Powder 1 Application(s) Topical every 8 hours  pantoprazole   Suspension 40 milliGRAM(s) Enteral Tube <User Schedule>  petrolatum Ophthalmic Ointment 1 Application(s) Both EYES four times a day  predniSONE   Tablet 5 milliGRAM(s) Oral daily  QUEtiapine 12.5 milliGRAM(s) Oral <User Schedule>  silver nitrate Applicator 1 Application(s) Topical once  simethicone 80 milliGRAM(s) Chew four times a day  zinc oxide 40% Paste 1 Application(s) Topical daily    MEDICATIONS  (PRN):  acetaminophen   Oral Liquid .. 1000 milliGRAM(s) Enteral Tube every 6 hours PRN Temp greater or equal to 38C (100.4F), Mild Pain (1 - 3)  benzocaine 20% Spray 1 Spray(s) Topical four times a day PRN Cough  diclofenac sodium 1% Gel 2 Gram(s) Topical four times a day PRN pain  diphenhydrAMINE Elixir 25 milliGRAM(s) Oral every 6 hours PRN Rash and/or Itching  guaifenesin/dextromethorphan Oral Liquid 10 milliLiter(s) Oral every 6 hours PRN Cough  oxyCODONE    Solution 5 milliGRAM(s) Oral every 6 hours PRN Moderate Pain (4 - 6)  sodium chloride 0.65% Nasal 1 Spray(s) Both Nostrils every 6 hours PRN Nasal Congestion      LABS:                  CAPILLARY BLOOD GLUCOSE    MICROBIOLOGY:     RADIOLOGY:  [ ] Reviewed and interpreted by me    Mode: AC/ CMV (Assist Control/ Continuous Mandatory Ventilation)  RR (machine): 12  TV (machine): 300  FiO2: 30  PEEP: 5  ITime: 1  MAP: 10  PIP: 27   Patient is a 74y old  Female who presents with a chief complaint of Bleeding from PEG (09 Jan 2024 15:37)      Interval Events:  No events over night.    REVIEW OF SYSTEMS:  [X] Positive:   Right shoulder pain  [ ] All other systems negative  [ ] Unable to assess ROS because ________    Vital Signs Last 24 Hrs  T(C): 36.6 (01-10-24 @ 04:35), Max: 37 (01-09-24 @ 13:21)  T(F): 97.8 (01-10-24 @ 04:35), Max: 98.6 (01-09-24 @ 13:21)  HR: 88 (01-10-24 @ 06:08) (75 - 111)  BP: 141/76 (01-10-24 @ 04:35) (116/62 - 151/79)  RR: 19 (01-10-24 @ 04:35) (13 - 19)  SpO2: 100% (01-10-24 @ 06:08) (98% - 100%)    PHYSICAL EXAM:  HEENT:   [X]Tracheostomy: #8 distal XLT Shiley  [X]Pupils equal  [ ]No oral lesions  [X] Abnormal: missing dentition; +teeth caries; +loose bottom teeth    SKIN  [ ]No Rash  [ ] Abnormal  [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    CARDIAC  [X]Regular  [ ]Abnormal    PULMONARY  [ ]Bilateral Clear Breath Sounds  [ ]Normal Excursion  [X]Abnormal - BL Coarse BS    GI  [X]PEG      [X] +BS		              [X]Soft, nondistended, nontender	  [ ]Abnormal    MUSCULOSKELETAL                                   [X]Bedbound                 [X] Abnormal: Pain with passive ROM of right shoulder, tender ACJ on palpation with swelling, fluctuance or bony abnormalities observed.  [ ]Ambulatory/OOB to chair                           EXTREMITIES                                         [X]Normal  [ ]Edema                           NEUROLOGIC  [ ] Normal, non focal  [X] Focal findings: opens eyes spontaneously, follows commands on all 4 extremities    PSYCHIATRIC  [X]Alert and appropriate  [ ] Sedated	 [ ]Agitated    :  Reed: [ ] Yes, if yes: Date of Placement:                   [X] No    LINES: Central Lines [ ] Yes, if yes: Date of Placement                                     [X] No      HOSPITAL MEDICATIONS:  MEDICATIONS  (STANDING):  albuterol/ipratropium for Nebulization 3 milliLiter(s) Nebulizer every 8 hours  amLODIPine   Tablet 10 milliGRAM(s) Oral daily  artificial  tears Solution 2 Drop(s) Both EYES four times a day  ascorbic acid 500 milliGRAM(s) Oral daily  calcium carbonate    500 mG (Tums) Chewable 1 Tablet(s) Chew every 12 hours  chlorhexidine 0.12% Liquid 15 milliLiter(s) Oral Mucosa every 12 hours  chlorhexidine 4% Liquid 1 Application(s) Topical <User Schedule>  dextrose 50% Injectable 12.5 Gram(s) IV Push once  dextrose 50% Injectable 25 Gram(s) IV Push once  escitalopram 10 milliGRAM(s) Oral <User Schedule>  gabapentin Solution 250 milliGRAM(s) Enteral Tube at bedtime  glucagon  Injectable 1 milliGRAM(s) IntraMuscular once  hemorrhoidal Ointment      hemorrhoidal Ointment 1 Application(s) Rectal daily  insulin glargine Injectable (LANTUS) 19 Unit(s) SubCutaneous every morning  insulin regular  human corrective regimen sliding scale   SubCutaneous every 6 hours  insulin regular  human recombinant 9 Unit(s) SubCutaneous <User Schedule>  insulin regular  human recombinant 34 Unit(s) SubCutaneous <User Schedule>  insulin regular  human recombinant 17 Unit(s) SubCutaneous <User Schedule>  levothyroxine 125 MICROGram(s) Oral <User Schedule>  lidocaine   4% Patch 1 Patch Transdermal daily  lidocaine   4% Patch 1 Patch Transdermal daily  lidocaine 2% Viscous 5 milliLiter(s) Mucosal two times a day  melatonin 5 milliGRAM(s) Oral at bedtime  melatonin 1 milliGRAM(s) Oral at bedtime  multivitamin/minerals/iron Oral Solution (CENTRUM) 15 milliLiter(s) Oral daily  nystatin Powder 1 Application(s) Topical every 8 hours  pantoprazole   Suspension 40 milliGRAM(s) Enteral Tube <User Schedule>  petrolatum Ophthalmic Ointment 1 Application(s) Both EYES four times a day  predniSONE   Tablet 5 milliGRAM(s) Oral daily  QUEtiapine 12.5 milliGRAM(s) Oral <User Schedule>  silver nitrate Applicator 1 Application(s) Topical once  simethicone 80 milliGRAM(s) Chew four times a day  zinc oxide 40% Paste 1 Application(s) Topical daily    MEDICATIONS  (PRN):  acetaminophen   Oral Liquid .. 1000 milliGRAM(s) Enteral Tube every 6 hours PRN Temp greater or equal to 38C (100.4F), Mild Pain (1 - 3)  benzocaine 20% Spray 1 Spray(s) Topical four times a day PRN Cough  diclofenac sodium 1% Gel 2 Gram(s) Topical four times a day PRN pain  diphenhydrAMINE Elixir 25 milliGRAM(s) Oral every 6 hours PRN Rash and/or Itching  guaifenesin/dextromethorphan Oral Liquid 10 milliLiter(s) Oral every 6 hours PRN Cough  oxyCODONE    Solution 5 milliGRAM(s) Oral every 6 hours PRN Moderate Pain (4 - 6)  sodium chloride 0.65% Nasal 1 Spray(s) Both Nostrils every 6 hours PRN Nasal Congestion      LABS:                  CAPILLARY BLOOD GLUCOSE    MICROBIOLOGY:     RADIOLOGY:  [ ] Reviewed and interpreted by me    Mode: AC/ CMV (Assist Control/ Continuous Mandatory Ventilation)  RR (machine): 12  TV (machine): 300  FiO2: 30  PEEP: 5  ITime: 1  MAP: 10  PIP: 27
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
## 2024-01-10 NOTE — PROGRESS NOTE ADULT - NSPROGADDITIONALINFOA_GEN_ALL_CORE
-Plan discussed with team.  Contact info: [phone] (24/7). pager [phone]  Amion on [place]  Teams on M-T-W-F. Unavailable Thu/Weekends/Holidays  Assessed pt/labs/meds and discussed plan of care with primary team  Adjusting insulin  Discharge plan  Follow up care -Plan discussed with team.  Contact info: [phone] (24/7). pager [phone]  Amion on [place]  Teams on M-T-W-F. Unavailable Thu/Weekends/Holidays  Assessed pt/labs/meds and discussed plan of care with primary team  Adjusting insulin  Discharge plan  Follow up care

## 2024-01-10 NOTE — PROGRESS NOTE ADULT - SUBJECTIVE AND OBJECTIVE BOX
[facility]-- WOUND TEAM -- FOLLOW UP NOTE  --------------------------------------------------------------------------------    24 hour events/subjective:          Diet:  Diet, NPO with Tube Feed:   Tube Feeding Modality: Gastrostomy  [employer] glucose support 1.2  Total Volume for 24 Hours (mL): 1200  Continuous  Starting Tube Feed Rate mL per Hour: 60  Increase Tube Feed Rate by (mL): 0  Until Goal Tube Feed Rate (mL per Hour): 60  Tube Feed Duration (in Hours): 20  Tube Feed Start Time: 06:00  Tube Feed Stop Time: 02:00  Free Water Flush  Pump   Rate (mL per Hour): 10   Frequency: Every 2 Hours  Alfred(7 Gm Arginine/7 Gm Glut/1.2 Gm HMB     Qty per Day:  2 (01-01-24 @ 08:00)      ROS: General/ SKIN/ MSK/ Neuro/ GI see HPI  all other systems negative  pt unable to offer    ALLERGIES & MEDICATIONS  --------------------------------------------------------------------------------  Allergies    penicillin (Unknown)  heparin (Unknown)  Tagamet (Unknown)  pineapple (Unknown)  walnut (Unknown)  Pecan, Filbert, Hazelnut (Unknown)  Lyrica (Unknown)    Intolerances          STANDING INPATIENT MEDICATIONS    albuterol/ipratropium for Nebulization 3 milliLiter(s) Nebulizer every 8 hours  amLODIPine   Tablet 10 milliGRAM(s) Oral daily  artificial  tears Solution 2 Drop(s) Both EYES four times a day  ascorbic acid 500 milliGRAM(s) Oral daily  calcium carbonate    500 mG (Tums) Chewable 1 Tablet(s) Chew every 12 hours  chlorhexidine 0.12% Liquid 15 milliLiter(s) Oral Mucosa every 12 hours  chlorhexidine 4% Liquid 1 Application(s) Topical <User Schedule>  dextrose 50% Injectable 12.5 Gram(s) IV Push once  dextrose 50% Injectable 25 Gram(s) IV Push once  escitalopram 10 milliGRAM(s) Oral <User Schedule>  gabapentin Solution 250 milliGRAM(s) Enteral Tube at bedtime  gabapentin Solution 100 milliGRAM(s) Oral <User Schedule>  glucagon  Injectable 1 milliGRAM(s) IntraMuscular once  hemorrhoidal Ointment 1 Application(s) Rectal daily  hemorrhoidal Ointment      insulin glargine Injectable (LANTUS) 19 Unit(s) SubCutaneous <User Schedule>  insulin regular  human corrective regimen sliding scale   SubCutaneous every 6 hours  insulin regular  human recombinant 9 Unit(s) SubCutaneous <User Schedule>  insulin regular  human recombinant 34 Unit(s) SubCutaneous <User Schedule>  insulin regular  human recombinant 17 Unit(s) SubCutaneous <User Schedule>  levothyroxine 125 MICROGram(s) Oral <User Schedule>  lidocaine   4% Patch 1 Patch Transdermal daily  lidocaine   4% Patch 1 Patch Transdermal daily  lidocaine 2% Viscous 5 milliLiter(s) Mucosal two times a day  melatonin 5 milliGRAM(s) Oral at bedtime  melatonin 1 milliGRAM(s) Oral at bedtime  multivitamin/minerals/iron Oral Solution (CENTRUM) 15 milliLiter(s) Oral daily  nystatin Powder 1 Application(s) Topical every 8 hours  pantoprazole   Suspension 40 milliGRAM(s) Enteral Tube <User Schedule>  petrolatum Ophthalmic Ointment 1 Application(s) Both EYES four times a day  predniSONE   Tablet 5 milliGRAM(s) Oral daily  QUEtiapine 12.5 milliGRAM(s) Oral <User Schedule>  silver nitrate Applicator 1 Application(s) Topical once  simethicone 80 milliGRAM(s) Chew four times a day  zinc oxide 40% Paste 1 Application(s) Topical daily      PRN INPATIENT MEDICATION  acetaminophen   Oral Liquid .. 1000 milliGRAM(s) Enteral Tube every 6 hours PRN  benzocaine 20% Spray 1 Spray(s) Topical four times a day PRN  diclofenac sodium 1% Gel 2 Gram(s) Topical four times a day PRN  diphenhydrAMINE Elixir 25 milliGRAM(s) Oral every 6 hours PRN  guaifenesin/dextromethorphan Oral Liquid 10 milliLiter(s) Oral every 6 hours PRN  lidocaine   4% Patch 1 Patch Transdermal daily PRN  oxyCODONE    Solution 5 milliGRAM(s) Oral every 6 hours PRN  sodium chloride 0.65% Nasal 1 Spray(s) Both Nostrils every 6 hours PRN        VITALS/PHYSICAL EXAM  --------------------------------------------------------------------------------  T(C): 36.3 (01-10-24 @ 11:35), Max: 36.8 (01-09-24 @ 17:33)  HR: 97 (01-10-24 @ 11:35) (76 - 97)  BP: 141/84 (01-10-24 @ 11:35) (116/62 - 141/84)  RR: 26 (01-10-24 @ 11:35) (13 - 26)  SpO2: 99% (01-10-24 @ 11:35) (98% - 100%)  Wt(kg): --        01-09-24 @ 07:01  -  01-10-24 @ 07:00  --------------------------------------------------------  IN: 620 mL / OUT: 550 mL / NET: 70 mL    01-10-24 @ 07:01  -  01-10-24 @ 13:27  --------------------------------------------------------  IN: 520 mL / OUT: 1000 mL / NET: -480 mL            LABS/ CULTURES/ RADIOLOGY:                    CAPILLARY BLOOD GLUCOSE      POCT Blood Glucose.: 192 mg/dL (10 Noe 2024 11:32)  POCT Blood Glucose.: 207 mg/dL (10 Noe 2024 08:38)  POCT Blood Glucose.: 153 mg/dL (10 Noe 2024 05:41)  POCT Blood Glucose.: 139 mg/dL (09 Jan 2024 23:48)  POCT Blood Glucose.: 186 mg/dL (09 Jan 2024 17:16)                      A1C with Estimated Average Glucose Result: 7.5 % (10-28-23 @ 07:18)   [facility]-- WOUND TEAM -- FOLLOW UP NOTE  --------------------------------------------------------------------------------    24 hour events/subjective:          Diet:  Diet, NPO with Tube Feed:   Tube Feeding Modality: Gastrostomy  [employer] glucose support 1.2  Total Volume for 24 Hours (mL): 1200  Continuous  Starting Tube Feed Rate mL per Hour: 60  Increase Tube Feed Rate by (mL): 0  Until Goal Tube Feed Rate (mL per Hour): 60  Tube Feed Duration (in Hours): 20  Tube Feed Start Time: 06:00  Tube Feed Stop Time: 02:00  Free Water Flush  Pump   Rate (mL per Hour): 10   Frequency: Every 2 Hours  Alfred(7 Gm Arginine/7 Gm Glut/1.2 Gm HMB     Qty per Day:  2 (01-01-24 @ 08:00)      ROS: General/ SKIN/ MSK/ Neuro/ GI see HPI  all other systems negative  pt unable to offer    ALLERGIES & MEDICATIONS  --------------------------------------------------------------------------------  Allergies    penicillin (Unknown)  heparin (Unknown)  Tagamet (Unknown)  pineapple (Unknown)  walnut (Unknown)  Pecan, Filbert, Hazelnut (Unknown)  Lyrica (Unknown)    Intolerances          STANDING INPATIENT MEDICATIONS    albuterol/ipratropium for Nebulization 3 milliLiter(s) Nebulizer every 8 hours  amLODIPine   Tablet 10 milliGRAM(s) Oral daily  artificial  tears Solution 2 Drop(s) Both EYES four times a day  ascorbic acid 500 milliGRAM(s) Oral daily  calcium carbonate    500 mG (Tums) Chewable 1 Tablet(s) Chew every 12 hours  chlorhexidine 0.12% Liquid 15 milliLiter(s) Oral Mucosa every 12 hours  chlorhexidine 4% Liquid 1 Application(s) Topical <User Schedule>  dextrose 50% Injectable 12.5 Gram(s) IV Push once  dextrose 50% Injectable 25 Gram(s) IV Push once  escitalopram 10 milliGRAM(s) Oral <User Schedule>  gabapentin Solution 250 milliGRAM(s) Enteral Tube at bedtime  gabapentin Solution 100 milliGRAM(s) Oral <User Schedule>  glucagon  Injectable 1 milliGRAM(s) IntraMuscular once  hemorrhoidal Ointment 1 Application(s) Rectal daily  hemorrhoidal Ointment      insulin glargine Injectable (LANTUS) 19 Unit(s) SubCutaneous <User Schedule>  insulin regular  human corrective regimen sliding scale   SubCutaneous every 6 hours  insulin regular  human recombinant 9 Unit(s) SubCutaneous <User Schedule>  insulin regular  human recombinant 34 Unit(s) SubCutaneous <User Schedule>  insulin regular  human recombinant 17 Unit(s) SubCutaneous <User Schedule>  levothyroxine 125 MICROGram(s) Oral <User Schedule>  lidocaine   4% Patch 1 Patch Transdermal daily  lidocaine   4% Patch 1 Patch Transdermal daily  lidocaine 2% Viscous 5 milliLiter(s) Mucosal two times a day  melatonin 5 milliGRAM(s) Oral at bedtime  melatonin 1 milliGRAM(s) Oral at bedtime  multivitamin/minerals/iron Oral Solution (CENTRUM) 15 milliLiter(s) Oral daily  nystatin Powder 1 Application(s) Topical every 8 hours  pantoprazole   Suspension 40 milliGRAM(s) Enteral Tube <User Schedule>  petrolatum Ophthalmic Ointment 1 Application(s) Both EYES four times a day  predniSONE   Tablet 5 milliGRAM(s) Oral daily  QUEtiapine 12.5 milliGRAM(s) Oral <User Schedule>  silver nitrate Applicator 1 Application(s) Topical once  simethicone 80 milliGRAM(s) Chew four times a day  zinc oxide 40% Paste 1 Application(s) Topical daily      PRN INPATIENT MEDICATION  acetaminophen   Oral Liquid .. 1000 milliGRAM(s) Enteral Tube every 6 hours PRN  benzocaine 20% Spray 1 Spray(s) Topical four times a day PRN  diclofenac sodium 1% Gel 2 Gram(s) Topical four times a day PRN  diphenhydrAMINE Elixir 25 milliGRAM(s) Oral every 6 hours PRN  guaifenesin/dextromethorphan Oral Liquid 10 milliLiter(s) Oral every 6 hours PRN  lidocaine   4% Patch 1 Patch Transdermal daily PRN  oxyCODONE    Solution 5 milliGRAM(s) Oral every 6 hours PRN  sodium chloride 0.65% Nasal 1 Spray(s) Both Nostrils every 6 hours PRN        VITALS/PHYSICAL EXAM  --------------------------------------------------------------------------------  T(C): 36.3 (01-10-24 @ 11:35), Max: 36.8 (01-09-24 @ 17:33)  HR: 97 (01-10-24 @ 11:35) (76 - 97)  BP: 141/84 (01-10-24 @ 11:35) (116/62 - 141/84)  RR: 26 (01-10-24 @ 11:35) (13 - 26)  SpO2: 99% (01-10-24 @ 11:35) (98% - 100%)  Wt(kg): --        01-09-24 @ 07:01  -  01-10-24 @ 07:00  --------------------------------------------------------  IN: 620 mL / OUT: 550 mL / NET: 70 mL    01-10-24 @ 07:01  -  01-10-24 @ 13:27  --------------------------------------------------------  IN: 520 mL / OUT: 1000 mL / NET: -480 mL            LABS/ CULTURES/ RADIOLOGY:                    CAPILLARY BLOOD GLUCOSE      POCT Blood Glucose.: 192 mg/dL (10 Noe 2024 11:32)  POCT Blood Glucose.: 207 mg/dL (10 Noe 2024 08:38)  POCT Blood Glucose.: 153 mg/dL (10 Noe 2024 05:41)  POCT Blood Glucose.: 139 mg/dL (09 Jan 2024 23:48)  POCT Blood Glucose.: 186 mg/dL (09 Jan 2024 17:16)                      A1C with Estimated Average Glucose Result: 7.5 % (10-28-23 @ 07:18)   [facility]-- WOUND TEAM -- FOLLOW UP NOTE  --------------------------------------------------------------------------------    24 hour events/subjective:    afebrile  alert  tolerating TF w/o vomiting  tolerating dressings     no odor pain excess drainage  incontinent-   dc planning    Diet:  Diet, NPO with Tube Feed:   Tube Feeding Modality: Gastrostomy  [employer] glucose support 1.2  Total Volume for 24 Hours (mL): 1200  Continuous  Starting Tube Feed Rate mL per Hour: 60  Increase Tube Feed Rate by (mL): 0  Until Goal Tube Feed Rate (mL per Hour): 60  Tube Feed Duration (in Hours): 20  Tube Feed Start Time: 06:00  Tube Feed Stop Time: 02:00  Free Water Flush  Pump   Rate (mL per Hour): 10   Frequency: Every 2 Hours  Alfred(7 Gm Arginine/7 Gm Glut/1.2 Gm HMB     Qty per Day:  2 (01-01-24 @ 08:00)      ROS: pt unable to offer    ALLERGIES & MEDICATIONS  --------------------------------------------------------------------------------  Allergies  penicillin (Unknown)  heparin (Unknown)  Tagamet (Unknown)  pineapple (Unknown)  walnut (Unknown)  Andressa Rawls Hazelcharlene (Unknown)  Lyrica (Unknown)      STANDING INPATIENT MEDICATIONS  albuterol/ipratropium for Nebulization 3 milliLiter(s) Nebulizer every 8 hours  amLODIPine   Tablet 10 milliGRAM(s) Oral daily  artificial  tears Solution 2 Drop(s) Both EYES four times a day  ascorbic acid 500 milliGRAM(s) Oral daily  calcium carbonate    500 mG (Tums) Chewable 1 Tablet(s) Chew every 12 hours  chlorhexidine 0.12% Liquid 15 milliLiter(s) Oral Mucosa every 12 hours  chlorhexidine 4% Liquid 1 Application(s) Topical <User Schedule>  dextrose 50% Injectable 12.5 Gram(s) IV Push once  dextrose 50% Injectable 25 Gram(s) IV Push once  escitalopram 10 milliGRAM(s) Oral <User Schedule>  gabapentin Solution 250 milliGRAM(s) Enteral Tube at bedtime  gabapentin Solution 100 milliGRAM(s) Oral <User Schedule>  glucagon  Injectable 1 milliGRAM(s) IntraMuscular once  hemorrhoidal Ointment 1 Application(s) Rectal daily  insulin glargine Injectable (LANTUS) 19 Unit(s) SubCutaneous <User Schedule>  insulin regular  human corrective regimen sliding scale   SubCutaneous every 6 hours  insulin regular  human recombinant 9 Unit(s) SubCutaneous <User Schedule>  insulin regular  human recombinant 34 Unit(s) SubCutaneous <User Schedule>  insulin regular  human recombinant 17 Unit(s) SubCutaneous <User Schedule>  levothyroxine 125 MICROGram(s) Oral <User Schedule>  lidocaine   4% Patch 1 Patch Transdermal daily  lidocaine   4% Patch 1 Patch Transdermal daily  lidocaine 2% Viscous 5 milliLiter(s) Mucosal two times a day  melatonin 5 milliGRAM(s) Oral at bedtime  melatonin 1 milliGRAM(s) Oral at bedtime  multivitamin/minerals/iron Oral Solution (CENTRUM) 15 milliLiter(s) Oral daily  nystatin Powder 1 Application(s) Topical every 8 hours  pantoprazole   Suspension 40 milliGRAM(s) Enteral Tube <User Schedule>  petrolatum Ophthalmic Ointment 1 Application(s) Both EYES four times a day  predniSONE   Tablet 5 milliGRAM(s) Oral daily  QUEtiapine 12.5 milliGRAM(s) Oral <User Schedule>  silver nitrate Applicator 1 Application(s) Topical once  simethicone 80 milliGRAM(s) Chew four times a day  zinc oxide 40% Paste 1 Application(s) Topical daily      PRN INPATIENT MEDICATION  acetaminophen   Oral Liquid 1000 milliGRAM(s) Enteral Tube every 6 hours PRN  benzocaine 20% Spray 1 Spray(s) Topical four times a day PRN  diclofenac sodium 1% Gel 2 Gram(s) Topical four times a day PRN  diphenhydrAMINE Elixir 25 milliGRAM(s) Oral every 6 hours PRN  guaifenesin/dextromethorphan Oral Liquid 10 milliLiter(s) Oral every 6 hours PRN  lidocaine   4% Patch 1 Patch Transdermal daily PRN  oxyCODONE    Solution 5 milliGRAM(s) Oral every 6 hours PRN  sodium chloride 0.65% Nasal 1 Spray(s) Both Nostrils every 6 hours PRN        VITALS/PHYSICAL EXAM  --------------------------------------------------------------------------------  T(C): 36.3 (01-10-24 @ 11:35), Max: 36.8 (01-09-24 @ 17:33)  HR: 97 (01-10-24 @ 11:35) (76 - 97)  BP: 141/84 (01-10-24 @ 11:35) (116/62 - 141/84)  RR: 26 (01-10-24 @ 11:35) (13 - 26)  SpO2: 99% (01-10-24 @ 11:35) (98% - 100%)  Wt(kg): --      NAD,  Alert, frail,  WD/ WN/ WG  Total Care Sport bed  HEENT:  NC/AT, EOMI, sclera clear, mucosa moist, throat clear, trach       w/o secretions or peritrach skin irration  Gastrointestinal: soft NT/ND (+)PEG w/o drainage or skin irration  : (+) primafit  Neurology:  nonverbal, no follow commands, paraplegic  Psych: calm/ appropriate  Musculoskeletal:  pROM, no deformities/ contractures   Vascular: BLE equally warm,  no cyanosis, clubbing nor acute ischemia  Skin:  moist w/ good turgor  Sacral stage 4 pressure injury  3cm x 1.5cm x 0.2cm   moist granular wound bed   palpable but not exposed bone  no blistering   (+) serosanguinous drainage  No odor, erythema, increased warmth, tenderness, induration, fluctuance, nor crepitus      CAPILLARY BLOOD GLUCOSE  POCT Blood Glucose.: 192 mg/dL (10 Noe 2024 11:32)  POCT Blood Glucose.: 207 mg/dL (10 Noe 2024 08:38)  POCT Blood Glucose.: 153 mg/dL (10 Noe 2024 05:41)  POCT Blood Glucose.: 139 mg/dL (09 Jan 2024 23:48)  POCT Blood Glucose.: 186 mg/dL (09 Jan 2024 17:16)    A1C with Estimated Average Glucose Result: 7.5 % (10-28-23 @ 07:18)   [facility]-- WOUND TEAM -- FOLLOW UP NOTE  --------------------------------------------------------------------------------    24 hour events/subjective:    afebrile  alert  tolerating TF w/o vomiting  tolerating dressings     no odor pain excess drainage  incontinent-   dc planning    Diet:  Diet, NPO with Tube Feed:   Tube Feeding Modality: Gastrostomy  [employer] glucose support 1.2  Total Volume for 24 Hours (mL): 1200  Continuous  Starting Tube Feed Rate mL per Hour: 60  Increase Tube Feed Rate by (mL): 0  Until Goal Tube Feed Rate (mL per Hour): 60  Tube Feed Duration (in Hours): 20  Tube Feed Start Time: 06:00  Tube Feed Stop Time: 02:00  Free Water Flush  Pump   Rate (mL per Hour): 10   Frequency: Every 2 Hours  Alfred(7 Gm Arginine/7 Gm Glut/1.2 Gm HMB     Qty per Day:  2 (01-01-24 @ 08:00)      ROS: pt unable to offer    ALLERGIES & MEDICATIONS  --------------------------------------------------------------------------------  Allergies  penicillin (Unknown)  heparin (Unknown)  Tagamet (Unknown)  pineapple (Unknown)  walnut (Unknown)  Andressa Rawls Hazelcharlene (Unknown)  Lyrica (Unknown)      STANDING INPATIENT MEDICATIONS  albuterol/ipratropium for Nebulization 3 milliLiter(s) Nebulizer every 8 hours  amLODIPine   Tablet 10 milliGRAM(s) Oral daily  artificial  tears Solution 2 Drop(s) Both EYES four times a day  ascorbic acid 500 milliGRAM(s) Oral daily  calcium carbonate    500 mG (Tums) Chewable 1 Tablet(s) Chew every 12 hours  chlorhexidine 0.12% Liquid 15 milliLiter(s) Oral Mucosa every 12 hours  chlorhexidine 4% Liquid 1 Application(s) Topical <User Schedule>  dextrose 50% Injectable 12.5 Gram(s) IV Push once  dextrose 50% Injectable 25 Gram(s) IV Push once  escitalopram 10 milliGRAM(s) Oral <User Schedule>  gabapentin Solution 250 milliGRAM(s) Enteral Tube at bedtime  gabapentin Solution 100 milliGRAM(s) Oral <User Schedule>  glucagon  Injectable 1 milliGRAM(s) IntraMuscular once  hemorrhoidal Ointment 1 Application(s) Rectal daily  insulin glargine Injectable (LANTUS) 19 Unit(s) SubCutaneous <User Schedule>  insulin regular  human corrective regimen sliding scale   SubCutaneous every 6 hours  insulin regular  human recombinant 9 Unit(s) SubCutaneous <User Schedule>  insulin regular  human recombinant 34 Unit(s) SubCutaneous <User Schedule>  insulin regular  human recombinant 17 Unit(s) SubCutaneous <User Schedule>  levothyroxine 125 MICROGram(s) Oral <User Schedule>  lidocaine   4% Patch 1 Patch Transdermal daily  lidocaine   4% Patch 1 Patch Transdermal daily  lidocaine 2% Viscous 5 milliLiter(s) Mucosal two times a day  melatonin 5 milliGRAM(s) Oral at bedtime  melatonin 1 milliGRAM(s) Oral at bedtime  multivitamin/minerals/iron Oral Solution (CENTRUM) 15 milliLiter(s) Oral daily  nystatin Powder 1 Application(s) Topical every 8 hours  pantoprazole   Suspension 40 milliGRAM(s) Enteral Tube <User Schedule>  petrolatum Ophthalmic Ointment 1 Application(s) Both EYES four times a day  predniSONE   Tablet 5 milliGRAM(s) Oral daily  QUEtiapine 12.5 milliGRAM(s) Oral <User Schedule>  silver nitrate Applicator 1 Application(s) Topical once  simethicone 80 milliGRAM(s) Chew four times a day  zinc oxide 40% Paste 1 Application(s) Topical daily      PRN INPATIENT MEDICATION  acetaminophen   Oral Liquid 1000 milliGRAM(s) Enteral Tube every 6 hours PRN  benzocaine 20% Spray 1 Spray(s) Topical four times a day PRN  diclofenac sodium 1% Gel 2 Gram(s) Topical four times a day PRN  diphenhydrAMINE Elixir 25 milliGRAM(s) Oral every 6 hours PRN  guaifenesin/dextromethorphan Oral Liquid 10 milliLiter(s) Oral every 6 hours PRN  lidocaine   4% Patch 1 Patch Transdermal daily PRN  oxyCODONE    Solution 5 milliGRAM(s) Oral every 6 hours PRN  sodium chloride 0.65% Nasal 1 Spray(s) Both Nostrils every 6 hours PRN        VITALS/PHYSICAL EXAM  --------------------------------------------------------------------------------  T(C): 36.3 (01-10-24 @ 11:35), Max: 36.8 (01-09-24 @ 17:33)  HR: 97 (01-10-24 @ 11:35) (76 - 97)  BP: 141/84 (01-10-24 @ 11:35) (116/62 - 141/84)  RR: 26 (01-10-24 @ 11:35) (13 - 26)  SpO2: 99% (01-10-24 @ 11:35) (98% - 100%)  Wt(kg): --      NAD,  Alert, frail,  WD/ WN/ WG  Total Care Sport bed  HEENT:  NC/AT, EOMI, sclera clear, mucosa moist, throat clear, trach       w/o secretions or peritrach skin irration  Gastrointestinal: soft NT/ND (+)PEG w/o drainage or skin irration  : (+) primafit  Neurology:  nonverbal, no follow commands, paraplegic  Psych: calm/ appropriate  Musculoskeletal:  pROM, no deformities/ contractures   Vascular: BLE equally warm,  no cyanosis, clubbing nor acute ischemia  Skin:  moist w/ good turgor  Sacral stage 4 pressure injury  3cm x 1.5cm x 0.2cm   moist granular wound bed   palpable but not exposed bone  no blistering   (+) serosanguinous drainage  No odor, erythema, increased warmth, tenderness, induration, fluctuance, nor crepitus      CAPILLARY BLOOD GLUCOSE  POCT Blood Glucose.: 192 mg/dL (10 Noe 2024 11:32)  POCT Blood Glucose.: 207 mg/dL (10 Noe 2024 08:38)  POCT Blood Glucose.: 153 mg/dL (10 Noe 2024 05:41)  POCT Blood Glucose.: 139 mg/dL (09 Jan 2024 23:48)  POCT Blood Glucose.: 186 mg/dL (09 Jan 2024 17:16)    A1C with Estimated Average Glucose Result: 7.5 % (10-28-23 @ 07:18)

## 2024-01-10 NOTE — PROGRESS NOTE ADULT - PROBLEM SELECTOR PLAN 7
- Continue home Prednisone regimen 5 mg daily   *fibromyalgia  - continue home Gabapentin 100mg daily  - continue home Fentanyl 25mcg Q72H patches  - c/w Lidocaine patch   - Oxycodone 2.5mg q6 hrs PRN (in addition to Tylenol PRN)  - 1/7-1/8 Right forearm and BL shoulder pain - w/u negative for DVT, imaging negative for acute fracture/dislocation, will continue with pain management as needed with meds above  - 1/9 increased gabapentin to 250mg PO, oxycodone increased to 5mg - Continue home Prednisone regimen 5 mg daily   *fibromyalgia  - continue home Fentanyl 25mcg Q72H patches  - c/w Lidocaine patch   - Oxycodone 2.5mg q6 hrs PRN (in addition to Tylenol PRN)  - 1/7-1/8 Right forearm and BL shoulder pain - w/u negative for DVT, imaging negative for acute fracture/dislocation, will continue with pain management as needed with meds above  - 1/9 increased gabapentin to 250mg PO, oxycodone increased to 5mg  -1/10: Gabapentin 100mg 6AM 2PM and 250m HD

## 2024-01-10 NOTE — PROGRESS NOTE ADULT - NS ATTEND AMEND GEN_ALL_CORE FT
73F with a h/o DM, HTN, HLD, anemia, hypothyroidism, RA, fibromyalgia, remote cerebral aneurysm with repair, hypercapnic respiratory failure s/p tracheostomy/PEG, bedbound, sacral pressure ulcer. Admitted w/ bleeding from tracheostomy and PEG w/ associated abdominal pain, recent hx of auditory/visual hallucinations. Imaging showed mild thickening along PEG tube tract and sacral ulcer extending to coccyx suggestive of acute osteomyelitis.    Remains on chronic mech vent, intermittently tolerating PS trials but unable to fully wean and unfortunately she will continue to be ventilator dependent  Tolerating PEG feeds  Sacral OM s/p 6 week course of Cipro and Keflex as per ID - completed 12/10/23. Appreciate wound care follow up.   Pain control - increase Neurontin and Oxycodone for right shoulder pain. Lidocaine patch and Voltaren. X-Ray with osteopenia, no fracture. UE dopplers negative.   Agree with plan as outlined above.  Discharge planning

## 2024-01-10 NOTE — PROGRESS NOTE ADULT - PROBLEM SELECTOR PLAN 11
GOC: Long discussion of RCU team with daughter, pt remains FULL CODE  - 1/5 - Dr Roman explained to pt's daughter re: complications with long tracheostomy dependance. Pt's daughter is concerned that pt has been failing PMV's trials. GOC: Long discussion of RCU team with daughter, pt remains FULL CODE

## 2024-01-11 LAB
GLUCOSE BLDC GLUCOMTR-MCNC: 148 MG/DL — HIGH (ref 70–99)
GLUCOSE BLDC GLUCOMTR-MCNC: 148 MG/DL — HIGH (ref 70–99)
GLUCOSE BLDC GLUCOMTR-MCNC: 159 MG/DL — HIGH (ref 70–99)
GLUCOSE BLDC GLUCOMTR-MCNC: 159 MG/DL — HIGH (ref 70–99)
GLUCOSE BLDC GLUCOMTR-MCNC: 248 MG/DL — HIGH (ref 70–99)
GLUCOSE BLDC GLUCOMTR-MCNC: 248 MG/DL — HIGH (ref 70–99)
GLUCOSE BLDC GLUCOMTR-MCNC: 368 MG/DL — HIGH (ref 70–99)
GLUCOSE BLDC GLUCOMTR-MCNC: 368 MG/DL — HIGH (ref 70–99)
GLUCOSE BLDC GLUCOMTR-MCNC: 393 MG/DL — HIGH (ref 70–99)
GLUCOSE BLDC GLUCOMTR-MCNC: 393 MG/DL — HIGH (ref 70–99)

## 2024-01-11 PROCEDURE — 99233 SBSQ HOSP IP/OBS HIGH 50: CPT

## 2024-01-11 PROCEDURE — 99232 SBSQ HOSP IP/OBS MODERATE 35: CPT

## 2024-01-11 RX ORDER — OXYCODONE HYDROCHLORIDE 5 MG/1
5 TABLET ORAL EVERY 6 HOURS
Refills: 0 | Status: DISCONTINUED | OUTPATIENT
Start: 2024-01-11 | End: 2024-01-17

## 2024-01-11 RX ORDER — INSULIN HUMAN 100 [IU]/ML
20 INJECTION, SOLUTION SUBCUTANEOUS
Refills: 0 | Status: DISCONTINUED | OUTPATIENT
Start: 2024-01-12 | End: 2024-01-13

## 2024-01-11 RX ORDER — INSULIN HUMAN 100 [IU]/ML
11 INJECTION, SOLUTION SUBCUTANEOUS
Refills: 0 | Status: DISCONTINUED | OUTPATIENT
Start: 2024-01-11 | End: 2024-01-13

## 2024-01-11 RX ORDER — INSULIN HUMAN 100 [IU]/ML
42 INJECTION, SOLUTION SUBCUTANEOUS
Refills: 0 | Status: DISCONTINUED | OUTPATIENT
Start: 2024-01-12 | End: 2024-01-13

## 2024-01-11 RX ORDER — ACETAMINOPHEN 500 MG
1000 TABLET ORAL EVERY 6 HOURS
Refills: 0 | Status: DISCONTINUED | OUTPATIENT
Start: 2024-01-11 | End: 2024-01-15

## 2024-01-11 RX ORDER — GABAPENTIN 400 MG/1
100 CAPSULE ORAL
Refills: 0 | Status: DISCONTINUED | OUTPATIENT
Start: 2024-01-11 | End: 2024-01-17

## 2024-01-11 RX ORDER — INSULIN GLARGINE 100 [IU]/ML
23 INJECTION, SOLUTION SUBCUTANEOUS
Refills: 0 | Status: DISCONTINUED | OUTPATIENT
Start: 2024-01-12 | End: 2024-01-12

## 2024-01-11 RX ORDER — LIDOCAINE 4 G/100G
2 CREAM TOPICAL DAILY
Refills: 0 | Status: DISCONTINUED | OUTPATIENT
Start: 2024-01-11 | End: 2024-01-16

## 2024-01-11 RX ADMIN — Medication 4: at 17:37

## 2024-01-11 RX ADMIN — Medication 3 MILLILITER(S): at 15:12

## 2024-01-11 RX ADMIN — INSULIN HUMAN 36 UNIT(S): 100 INJECTION, SOLUTION SUBCUTANEOUS at 05:28

## 2024-01-11 RX ADMIN — Medication 1 APPLICATION(S): at 12:34

## 2024-01-11 RX ADMIN — Medication 2 DROP(S): at 05:14

## 2024-01-11 RX ADMIN — Medication 1 MILLIGRAM(S): at 21:53

## 2024-01-11 RX ADMIN — PHENYLEPHRINE-SHARK LIVER OIL-MINERAL OIL-PETROLATUM RECTAL OINTMENT 1 APPLICATION(S): at 12:35

## 2024-01-11 RX ADMIN — INSULIN HUMAN 17 UNIT(S): 100 INJECTION, SOLUTION SUBCUTANEOUS at 12:33

## 2024-01-11 RX ADMIN — OXYCODONE HYDROCHLORIDE 5 MILLIGRAM(S): 5 TABLET ORAL at 15:25

## 2024-01-11 RX ADMIN — GABAPENTIN 100 MILLIGRAM(S): 400 CAPSULE ORAL at 05:11

## 2024-01-11 RX ADMIN — LIDOCAINE 5 MILLILITER(S): 4 CREAM TOPICAL at 05:10

## 2024-01-11 RX ADMIN — ESCITALOPRAM OXALATE 10 MILLIGRAM(S): 10 TABLET, FILM COATED ORAL at 08:41

## 2024-01-11 RX ADMIN — INSULIN GLARGINE 19 UNIT(S): 100 INJECTION, SOLUTION SUBCUTANEOUS at 06:45

## 2024-01-11 RX ADMIN — Medication 15 MILLILITER(S): at 12:34

## 2024-01-11 RX ADMIN — Medication 1 APPLICATION(S): at 21:56

## 2024-01-11 RX ADMIN — CHLORHEXIDINE GLUCONATE 15 MILLILITER(S): 213 SOLUTION TOPICAL at 18:40

## 2024-01-11 RX ADMIN — Medication 3 MILLILITER(S): at 21:50

## 2024-01-11 RX ADMIN — AMLODIPINE BESYLATE 10 MILLIGRAM(S): 2.5 TABLET ORAL at 05:11

## 2024-01-11 RX ADMIN — CHLORHEXIDINE GLUCONATE 1 APPLICATION(S): 213 SOLUTION TOPICAL at 05:14

## 2024-01-11 RX ADMIN — OXYCODONE HYDROCHLORIDE 5 MILLIGRAM(S): 5 TABLET ORAL at 00:16

## 2024-01-11 RX ADMIN — SIMETHICONE 80 MILLIGRAM(S): 80 TABLET, CHEWABLE ORAL at 21:54

## 2024-01-11 RX ADMIN — NYSTATIN CREAM 1 APPLICATION(S): 100000 CREAM TOPICAL at 21:55

## 2024-01-11 RX ADMIN — Medication 1 TABLET(S): at 17:36

## 2024-01-11 RX ADMIN — Medication 2 DROP(S): at 12:34

## 2024-01-11 RX ADMIN — OXYCODONE HYDROCHLORIDE 5 MILLIGRAM(S): 5 TABLET ORAL at 14:55

## 2024-01-11 RX ADMIN — SIMETHICONE 80 MILLIGRAM(S): 80 TABLET, CHEWABLE ORAL at 05:12

## 2024-01-11 RX ADMIN — LIDOCAINE 1 PATCH: 4 CREAM TOPICAL at 16:32

## 2024-01-11 RX ADMIN — OXYCODONE HYDROCHLORIDE 5 MILLIGRAM(S): 5 TABLET ORAL at 21:53

## 2024-01-11 RX ADMIN — GABAPENTIN 250 MILLIGRAM(S): 400 CAPSULE ORAL at 21:55

## 2024-01-11 RX ADMIN — ZINC OXIDE 1 APPLICATION(S): 200 OINTMENT TOPICAL at 12:35

## 2024-01-11 RX ADMIN — Medication 1 APPLICATION(S): at 17:39

## 2024-01-11 RX ADMIN — Medication 10: at 05:34

## 2024-01-11 RX ADMIN — Medication 1 APPLICATION(S): at 05:14

## 2024-01-11 RX ADMIN — PANTOPRAZOLE SODIUM 40 MILLIGRAM(S): 20 TABLET, DELAYED RELEASE ORAL at 08:41

## 2024-01-11 RX ADMIN — Medication 125 MICROGRAM(S): at 05:11

## 2024-01-11 RX ADMIN — LIDOCAINE 1 PATCH: 4 CREAM TOPICAL at 16:33

## 2024-01-11 RX ADMIN — Medication 500 MILLIGRAM(S): at 12:34

## 2024-01-11 RX ADMIN — Medication 2 DROP(S): at 21:56

## 2024-01-11 RX ADMIN — Medication 3 MILLILITER(S): at 05:23

## 2024-01-11 RX ADMIN — OXYCODONE HYDROCHLORIDE 5 MILLIGRAM(S): 5 TABLET ORAL at 22:40

## 2024-01-11 RX ADMIN — Medication 1000 MILLIGRAM(S): at 01:17

## 2024-01-11 RX ADMIN — CHLORHEXIDINE GLUCONATE 15 MILLILITER(S): 213 SOLUTION TOPICAL at 05:11

## 2024-01-11 RX ADMIN — NYSTATIN CREAM 1 APPLICATION(S): 100000 CREAM TOPICAL at 05:15

## 2024-01-11 RX ADMIN — SIMETHICONE 80 MILLIGRAM(S): 80 TABLET, CHEWABLE ORAL at 12:34

## 2024-01-11 RX ADMIN — LIDOCAINE 1 PATCH: 4 CREAM TOPICAL at 19:39

## 2024-01-11 RX ADMIN — Medication 40 MILLIGRAM(S): at 01:17

## 2024-01-11 RX ADMIN — SIMETHICONE 80 MILLIGRAM(S): 80 TABLET, CHEWABLE ORAL at 17:36

## 2024-01-11 RX ADMIN — Medication 2 DROP(S): at 17:39

## 2024-01-11 RX ADMIN — NYSTATIN CREAM 1 APPLICATION(S): 100000 CREAM TOPICAL at 14:51

## 2024-01-11 RX ADMIN — LIDOCAINE 5 MILLILITER(S): 4 CREAM TOPICAL at 17:38

## 2024-01-11 RX ADMIN — QUETIAPINE FUMARATE 12.5 MILLIGRAM(S): 200 TABLET, FILM COATED ORAL at 17:36

## 2024-01-11 RX ADMIN — Medication 1 TABLET(S): at 05:11

## 2024-01-11 RX ADMIN — Medication 5 MILLIGRAM(S): at 21:53

## 2024-01-11 RX ADMIN — Medication 10: at 12:32

## 2024-01-11 RX ADMIN — INSULIN HUMAN 11 UNIT(S): 100 INJECTION, SOLUTION SUBCUTANEOUS at 17:37

## 2024-01-11 NOTE — PROGRESS NOTE ADULT - SUBJECTIVE AND OBJECTIVE BOX
Patient is a 74y old  Female who presents with a chief complaint of Bleeding from PEG (10 Noe 2024 15:43)      Interval Events:    REVIEW OF SYSTEMS:  [ ] Positive  [ ] All other systems negative  [ ] Unable to assess ROS because ________    Vital Signs Last 24 Hrs  T(C): 36.7 (01-11-24 @ 05:16), Max: 38.1 (01-10-24 @ 18:30)  T(F): 98.1 (01-11-24 @ 05:16), Max: 100.6 (01-11-24 @ 00:30)  HR: 82 (01-11-24 @ 05:23) (77 - 102)  BP: 113/62 (01-11-24 @ 05:16) (113/62 - 142/75)  RR: 22 (01-10-24 @ 21:24) (20 - 26)  SpO2: 99% (01-11-24 @ 05:23) (98% - 100%)    PHYSICAL EXAM:  HEENT:   [ ]Tracheostomy:  [ ]Pupils equal  [ ]No oral lesions  [ ]Abnormal    SKIN  [ ]No Rash  [ ] Abnormal  [ ] pressure    CARDIAC  [ ]Regular  [ ]Abnormal    PULMONARY  [ ]Bilateral Clear Breath Sounds  [ ]Normal Excursion  [ ]Abnormal    GI  [ ]PEG      [ ] +BS		              [ ]Soft, nondistended, nontender	  [ ]Abnormal    MUSCULOSKELETAL                                   [ ]Bedbound                 [ ]Abnormal    [ ]Ambulatory/OOB to chair                           EXTREMITIES                                         [ ]Normal  [ ]Edema                           NEUROLOGIC  [ ] Normal, non focal  [ ] Focal findings:    PSYCHIATRIC  [ ]Alert and appropriate  [ ] Sedated	 [ ]Agitated    :  Mcbride: [ ] Yes, if yes: Date of Placement:                   [  ] No    LINES: Central Lines [ ] Yes, if yes: Date of Placement                                     [  ] No    HOSPITAL MEDICATIONS:  MEDICATIONS  (STANDING):  albuterol/ipratropium for Nebulization 3 milliLiter(s) Nebulizer every 8 hours  amLODIPine   Tablet 10 milliGRAM(s) Oral daily  artificial  tears Solution 2 Drop(s) Both EYES four times a day  ascorbic acid 500 milliGRAM(s) Oral daily  calcium carbonate    500 mG (Tums) Chewable 1 Tablet(s) Chew every 12 hours  chlorhexidine 0.12% Liquid 15 milliLiter(s) Oral Mucosa every 12 hours  chlorhexidine 4% Liquid 1 Application(s) Topical <User Schedule>  dextrose 50% Injectable 12.5 Gram(s) IV Push once  dextrose 50% Injectable 25 Gram(s) IV Push once  escitalopram 10 milliGRAM(s) Oral <User Schedule>  gabapentin Solution 250 milliGRAM(s) Enteral Tube at bedtime  gabapentin Solution 100 milliGRAM(s) Oral <User Schedule>  glucagon  Injectable 1 milliGRAM(s) IntraMuscular once  hemorrhoidal Ointment 1 Application(s) Rectal daily  hemorrhoidal Ointment      insulin glargine Injectable (LANTUS) 19 Unit(s) SubCutaneous <User Schedule>  insulin lispro (ADMELOG) corrective regimen sliding scale   SubCutaneous every 6 hours  insulin regular  human recombinant 9 Unit(s) SubCutaneous <User Schedule>  insulin regular  human recombinant 36 Unit(s) SubCutaneous <User Schedule>  insulin regular  human recombinant 17 Unit(s) SubCutaneous <User Schedule>  levothyroxine 125 MICROGram(s) Oral <User Schedule>  lidocaine   4% Patch 1 Patch Transdermal daily  lidocaine   4% Patch 1 Patch Transdermal daily  lidocaine 2% Viscous 5 milliLiter(s) Mucosal two times a day  melatonin 1 milliGRAM(s) Oral at bedtime  melatonin 5 milliGRAM(s) Oral at bedtime  multivitamin/minerals/iron Oral Solution (CENTRUM) 15 milliLiter(s) Oral daily  nystatin Powder 1 Application(s) Topical every 8 hours  pantoprazole   Suspension 40 milliGRAM(s) Enteral Tube <User Schedule>  petrolatum Ophthalmic Ointment 1 Application(s) Both EYES four times a day  predniSONE  Solution 30 milliGRAM(s) Enteral Tube <User Schedule>  QUEtiapine 12.5 milliGRAM(s) Oral <User Schedule>  silver nitrate Applicator 1 Application(s) Topical once  simethicone 80 milliGRAM(s) Chew four times a day  zinc oxide 40% Paste 1 Application(s) Topical daily    MEDICATIONS  (PRN):  acetaminophen   Oral Liquid .. 1000 milliGRAM(s) Enteral Tube every 6 hours PRN Temp greater or equal to 38C (100.4F), Mild Pain (1 - 3)  benzocaine 20% Spray 1 Spray(s) Topical four times a day PRN Cough  diclofenac sodium 1% Gel 2 Gram(s) Topical four times a day PRN pain  diphenhydrAMINE Elixir 25 milliGRAM(s) Oral every 6 hours PRN Rash and/or Itching  guaifenesin/dextromethorphan Oral Liquid 10 milliLiter(s) Oral every 6 hours PRN Cough  lidocaine   4% Patch 1 Patch Transdermal daily PRN shoulder pain  oxyCODONE    Solution 5 milliGRAM(s) Oral every 6 hours PRN Moderate Pain (4 - 6)  sodium chloride 0.65% Nasal 1 Spray(s) Both Nostrils every 6 hours PRN Nasal Congestion      LABS:                  CAPILLARY BLOOD GLUCOSE    MICROBIOLOGY:     RADIOLOGY:  [ ] Reviewed and interpreted by me    Mode: AC/ CMV (Assist Control/ Continuous Mandatory Ventilation)  RR (machine): 12  TV (machine): 300  FiO2: 30  PEEP: 5  PS:   ITime: 1  MAP: 11  PIP: 28   Patient is a 74y old  Female who presents with a chief complaint of Bleeding from PEG (10 Noe 2024 15:43)      Interval Events: Tmax of 100.6F rectal over night    REVIEW OF SYSTEMS:  [X] Positive:   Right shoulder pain  [ ] All other systems negative  [ ] Unable to assess ROS because ________    Vital Signs Last 24 Hrs  T(C): 36.7 (01-11-24 @ 05:16), Max: 38.1 (01-10-24 @ 18:30)  T(F): 98.1 (01-11-24 @ 05:16), Max: 100.6 (01-11-24 @ 00:30)  HR: 82 (01-11-24 @ 05:23) (77 - 102)  BP: 113/62 (01-11-24 @ 05:16) (113/62 - 142/75)  RR: 22 (01-10-24 @ 21:24) (20 - 26)  SpO2: 99% (01-11-24 @ 05:23) (98% - 100%)      PHYSICAL EXAM:  HEENT:   [X]Tracheostomy: #8 distal XLT Shiley  [X]Pupils equal  [ ]No oral lesions  [X] Abnormal: missing dentition; +teeth caries; +loose bottom teeth    SKIN  [ ]No Rash  [ ] Abnormal  [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    CARDIAC  [X]Regular  [ ]Abnormal    PULMONARY  [ ]Bilateral Clear Breath Sounds  [ ]Normal Excursion  [X]Abnormal - BL Coarse BS    GI  [X]PEG      [X] +BS		              [X]Soft, nondistended, nontender	  [ ]Abnormal    MUSCULOSKELETAL                                   [X]Bedbound                 [X] Abnormal: Pain with passive ROM of right shoulder, tender ACJ/bicipital groove on palpation without swelling, fluctuance or bony abnormalities observed.  [ ]Ambulatory/OOB to chair                           EXTREMITIES                                         [X]Normal  [ ]Edema                           NEUROLOGIC  [ ] Normal, non focal  [X] Focal findings: opens eyes spontaneously, follows commands on all 4 extremities    PSYCHIATRIC  [X]Alert and appropriate  [ ] Sedated	 [ ]Agitated    :  Mcbride: [ ] Yes, if yes: Date of Placement:                   [X] No    LINES: Central Lines [ ] Yes, if yes: Date of Placement                                     [X] No        HOSPITAL MEDICATIONS:  MEDICATIONS  (STANDING):  albuterol/ipratropium for Nebulization 3 milliLiter(s) Nebulizer every 8 hours  amLODIPine   Tablet 10 milliGRAM(s) Oral daily  artificial  tears Solution 2 Drop(s) Both EYES four times a day  ascorbic acid 500 milliGRAM(s) Oral daily  calcium carbonate    500 mG (Tums) Chewable 1 Tablet(s) Chew every 12 hours  chlorhexidine 0.12% Liquid 15 milliLiter(s) Oral Mucosa every 12 hours  chlorhexidine 4% Liquid 1 Application(s) Topical <User Schedule>  dextrose 50% Injectable 12.5 Gram(s) IV Push once  dextrose 50% Injectable 25 Gram(s) IV Push once  escitalopram 10 milliGRAM(s) Oral <User Schedule>  gabapentin Solution 250 milliGRAM(s) Enteral Tube at bedtime  gabapentin Solution 100 milliGRAM(s) Oral <User Schedule>  glucagon  Injectable 1 milliGRAM(s) IntraMuscular once  hemorrhoidal Ointment 1 Application(s) Rectal daily  hemorrhoidal Ointment      insulin glargine Injectable (LANTUS) 19 Unit(s) SubCutaneous <User Schedule>  insulin lispro (ADMELOG) corrective regimen sliding scale   SubCutaneous every 6 hours  insulin regular  human recombinant 9 Unit(s) SubCutaneous <User Schedule>  insulin regular  human recombinant 36 Unit(s) SubCutaneous <User Schedule>  insulin regular  human recombinant 17 Unit(s) SubCutaneous <User Schedule>  levothyroxine 125 MICROGram(s) Oral <User Schedule>  lidocaine   4% Patch 1 Patch Transdermal daily  lidocaine   4% Patch 1 Patch Transdermal daily  lidocaine 2% Viscous 5 milliLiter(s) Mucosal two times a day  melatonin 1 milliGRAM(s) Oral at bedtime  melatonin 5 milliGRAM(s) Oral at bedtime  multivitamin/minerals/iron Oral Solution (CENTRUM) 15 milliLiter(s) Oral daily  nystatin Powder 1 Application(s) Topical every 8 hours  pantoprazole   Suspension 40 milliGRAM(s) Enteral Tube <User Schedule>  petrolatum Ophthalmic Ointment 1 Application(s) Both EYES four times a day  predniSONE  Solution 30 milliGRAM(s) Enteral Tube <User Schedule>  QUEtiapine 12.5 milliGRAM(s) Oral <User Schedule>  silver nitrate Applicator 1 Application(s) Topical once  simethicone 80 milliGRAM(s) Chew four times a day  zinc oxide 40% Paste 1 Application(s) Topical daily    MEDICATIONS  (PRN):  acetaminophen   Oral Liquid .. 1000 milliGRAM(s) Enteral Tube every 6 hours PRN Temp greater or equal to 38C (100.4F), Mild Pain (1 - 3)  benzocaine 20% Spray 1 Spray(s) Topical four times a day PRN Cough  diclofenac sodium 1% Gel 2 Gram(s) Topical four times a day PRN pain  diphenhydrAMINE Elixir 25 milliGRAM(s) Oral every 6 hours PRN Rash and/or Itching  guaifenesin/dextromethorphan Oral Liquid 10 milliLiter(s) Oral every 6 hours PRN Cough  lidocaine   4% Patch 1 Patch Transdermal daily PRN shoulder pain  oxyCODONE    Solution 5 milliGRAM(s) Oral every 6 hours PRN Moderate Pain (4 - 6)  sodium chloride 0.65% Nasal 1 Spray(s) Both Nostrils every 6 hours PRN Nasal Congestion      LABS:                  CAPILLARY BLOOD GLUCOSE    MICROBIOLOGY:     RADIOLOGY:  [ ] Reviewed and interpreted by me    Mode: AC/ CMV (Assist Control/ Continuous Mandatory Ventilation)  RR (machine): 12  TV (machine): 300  FiO2: 30  PEEP: 5  PS:   ITime: 1  MAP: 11  PIP: 28

## 2024-01-11 NOTE — PROGRESS NOTE ADULT - ASSESSMENT
74 y/o F w/h/o T2DM with unknown control due to anemia of chronic disease skewing A1C levels. On Levemir and Humalog insulin PTA. Unknown DM complications. Also h/o HTN, HLD, anemia, hypothyroidism, RA, fibromyalgia, remote cerebral aneurysm repair, acute hypercapnic respiratory failure now with tracheostomy, PEG tube, and bedbound (trach change 8/18, PEG change 8/14), sacral pressure ulcer, BIBEMS from home for 6 day hx of bleeding from the tracheostomy and PEG tube with associated abdominal pain> now resolved. Endocrinology consulted for hyperglycemia. Tolerating TFs with BG previously at goal now with hyperglycemia as steroid taper initiated overnight per team for rheumatoid arthritis.  Adjust dosing for steroid taper  to keep BG Goal 100-180mg/dl. No hypoglycemia.     Steroid taper:  Pred 40mg x1 1/11 1am  Pred 30mg 1/12 0600  Pred 20mg 1/13 0600  Pred 10mg 1/14 0600  Pred 5mg 1/15 0600
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
## 2024-01-11 NOTE — PROGRESS NOTE ADULT - PROBLEM SELECTOR PLAN 5
- LDL goal <70 due to DM  - Outpatient fasting lipid profile   - Consider moderate intensity Statin if not contraindicated and based on risks/benefits for pt  - Manage per primary team    discussed with patient and primary team Sylvain CORONEL  Contact via [employer] Teams during business hours  To reach covering provider access AMION via sunrise tools  For Urgent matters/after-hours/weekends/holidays please page endocrine fellow on call   For nonurgent matters please email [email or web address]    Please note that this patient may be followed by different provider tomorrow.  Notify endocrine 24 hours prior to discharge for final recommendations - LDL goal <70 due to DM  - Outpatient fasting lipid profile   - Consider moderate intensity Statin if not contraindicated and based on risks/benefits for pt  - Manage per primary team    discussed with patient and primary team Sylvain CORONEL  Contact via [employer] Teams during business hours  To reach covering provider access AMION via sunrise tools  For Urgent matters/after-hours/weekends/holidays please page endocrine fellow on call   For nonurgent matters please email [email or web address]    Please note that this patient may be followed by different provider tomorrow.  Notify endocrine 24 hours prior to discharge for final recommendations

## 2024-01-11 NOTE — PROGRESS NOTE ADULT - NS ATTEND AMEND GEN_ALL_CORE FT
73F with a h/o DM, HTN, HLD, anemia, hypothyroidism, RA, fibromyalgia, remote cerebral aneurysm with repair, hypercapnic respiratory failure s/p tracheostomy/PEG, bedbound, sacral pressure ulcer. Admitted w/ bleeding from tracheostomy and PEG w/ associated abdominal pain, recent hx of auditory/visual hallucinations. Imaging showed mild thickening along PEG tube tract and sacral ulcer extending to coccyx suggestive of acute osteomyelitis.    Remains on chronic mech vent, intermittently tolerating PS trials but unable to fully wean and unfortunately she will continue to be ventilator dependent  Tolerating PEG feeds  Sacral OM s/p 6 week course of Cipro and Keflex as per ID - completed 12/10/23. Appreciate wound care follow up.   Pain control - right shoulder pain likely from immobility (frozen shoulder) vs. inflammatory. c/w Neurontin and Oxycodone, Lidocaine patch and Voltaren. X-Ray with osteopenia, no fracture. UE dopplers negative. Prednisone also increased last night  Low grade temp 100.6 last night, will monitor. No signs of infection   Agree with plan as outlined above.  Discharge planning

## 2024-01-11 NOTE — PROGRESS NOTE ADULT - PROBLEM SELECTOR PLAN 2
- Due to chronic steroid use pt is at risk of tertiary AI, please do not abruptly discontinue or hold steroids as this can result in an adrenal crisis   - c/w prednisone 5mg daily after steroid taper (anticipate back to home steroid dose on 1/15)

## 2024-01-11 NOTE — PROGRESS NOTE ADULT - PROBLEM SELECTOR PLAN 7
- Continue home Prednisone regimen 5 mg daily   *fibromyalgia  - continue home Fentanyl 25mcg Q72H patches  - c/w Lidocaine patch   - Oxycodone 2.5mg q6 hrs PRN (in addition to Tylenol PRN)  - 1/7-1/8 Right forearm and BL shoulder pain - w/u negative for DVT, imaging negative for acute fracture/dislocation, will continue with pain management as needed with meds above  - 1/9 increased gabapentin to 250mg PO, oxycodone increased to 5mg  -1/10: Gabapentin 100mg 6AM 2PM and 250m HD

## 2024-01-11 NOTE — PROGRESS NOTE ADULT - PROBLEM SELECTOR PLAN 9
25-Nov-2019 14:38 Continue Home Lexapro 10mg daily   Psych consult appreciated   - Continue Seroquel 12.5mg daily (6PM)  - pt is calm and cooperative

## 2024-01-11 NOTE — PROGRESS NOTE ADULT - SUBJECTIVE AND OBJECTIVE BOX
seen earlier today     Chief Complaint: Diabetes Mellitus follow up    INTERVAL HX: tolerating tube feeding, started on prednisone taper received 40mg x1 last night with bg above goal today      Review of Systems:  General: As above  GI: No nausea, vomiting  Endocrine: no  S&Sx of hypoglycemia    Allergies    penicillin (Unknown)  heparin (Unknown)  Tagamet (Unknown)  pineapple (Unknown)  walnut (Unknown)  Pecan, Filbert, Hazelnut (Unknown)  Lyrica (Unknown)    Intolerances      MEDICATIONS  (STANDING):  albuterol/ipratropium for Nebulization 3 milliLiter(s) Nebulizer every 8 hours  amLODIPine   Tablet 10 milliGRAM(s) Oral daily  artificial  tears Solution 2 Drop(s) Both EYES four times a day  ascorbic acid 500 milliGRAM(s) Oral daily  calcium carbonate    500 mG (Tums) Chewable 1 Tablet(s) Chew every 12 hours  chlorhexidine 0.12% Liquid 15 milliLiter(s) Oral Mucosa every 12 hours  chlorhexidine 4% Liquid 1 Application(s) Topical <User Schedule>  dextrose 50% Injectable 12.5 Gram(s) IV Push once  dextrose 50% Injectable 25 Gram(s) IV Push once  escitalopram 10 milliGRAM(s) Oral <User Schedule>  gabapentin Solution 250 milliGRAM(s) Enteral Tube at bedtime  glucagon  Injectable 1 milliGRAM(s) IntraMuscular once  hemorrhoidal Ointment      hemorrhoidal Ointment 1 Application(s) Rectal daily  insulin glargine Injectable (LANTUS) 19 Unit(s) SubCutaneous <User Schedule>  insulin lispro (ADMELOG) corrective regimen sliding scale   SubCutaneous every 6 hours  insulin regular  human recombinant 9 Unit(s) SubCutaneous <User Schedule>  insulin regular  human recombinant 17 Unit(s) SubCutaneous <User Schedule>  insulin regular  human recombinant 36 Unit(s) SubCutaneous <User Schedule>  levothyroxine 125 MICROGram(s) Oral <User Schedule>  lidocaine   4% Patch 1 Patch Transdermal daily  lidocaine   4% Patch 1 Patch Transdermal daily  lidocaine 2% Viscous 5 milliLiter(s) Mucosal two times a day  melatonin 5 milliGRAM(s) Oral at bedtime  melatonin 1 milliGRAM(s) Oral at bedtime  multivitamin/minerals/iron Oral Solution (CENTRUM) 15 milliLiter(s) Oral daily  nystatin Powder 1 Application(s) Topical every 8 hours  pantoprazole   Suspension 40 milliGRAM(s) Enteral Tube <User Schedule>  petrolatum Ophthalmic Ointment 1 Application(s) Both EYES four times a day  predniSONE  Solution 30 milliGRAM(s) Enteral Tube <User Schedule>  QUEtiapine 12.5 milliGRAM(s) Oral <User Schedule>  silver nitrate Applicator 1 Application(s) Topical once  simethicone 80 milliGRAM(s) Chew four times a day  zinc oxide 40% Paste 1 Application(s) Topical daily        insulin glargine Injectable (LANTUS)   19 Unit(s) SubCutaneous (01-11-24 @ 06:45)    insulin lispro (ADMELOG) corrective regimen sliding scale   10 Unit(s) SubCutaneous (01-11-24 @ 12:32)   10 Unit(s) SubCutaneous (01-11-24 @ 05:34)    insulin regular  human recombinant   9 Unit(s) SubCutaneous (01-10-24 @ 18:13)    insulin regular  human recombinant   17 Unit(s) SubCutaneous (01-11-24 @ 12:33)    insulin regular  human recombinant   36 Unit(s) SubCutaneous (01-11-24 @ 05:28)    levothyroxine   125 MICROGram(s) Oral (01-11-24 @ 05:11)    predniSONE  Solution   40 milliGRAM(s) Enteral Tube (01-11-24 @ 01:17)        PHYSICAL EXAM:  VITALS: T(C): 36.6 (01-11-24 @ 11:36)  T(F): 97.9 (01-11-24 @ 11:36), Max: 100.6 (01-11-24 @ 00:30)  HR: 88 (01-11-24 @ 11:36) (77 - 102)  BP: 108/53 (01-11-24 @ 11:36) (108/53 - 142/75)  RR:  (22 - 24)  SpO2:  (98% - 100%)  Wt(kg): --  GENERAL: NAD, peg with TF running at goal  Respiratory: Respirations unlabored  trach to vent  Extremities: Warm, trace LE edema  NEURO: Alert , appropriate nodding head yes/no to questions    LABS:  POCT Blood Glucose.: 393 mg/dL (01-11-24 @ 11:34)  POCT Blood Glucose.: 368 mg/dL (01-11-24 @ 05:14)  POCT Blood Glucose.: 148 mg/dL (01-11-24 @ 00:02)  POCT Blood Glucose.: 138 mg/dL (01-10-24 @ 17:14)  POCT Blood Glucose.: 192 mg/dL (01-10-24 @ 11:32)  POCT Blood Glucose.: 207 mg/dL (01-10-24 @ 08:38)  POCT Blood Glucose.: 153 mg/dL (01-10-24 @ 05:41)  POCT Blood Glucose.: 139 mg/dL (01-09-24 @ 23:48)  POCT Blood Glucose.: 186 mg/dL (01-09-24 @ 17:16)  POCT Blood Glucose.: 189 mg/dL (01-09-24 @ 12:02)  POCT Blood Glucose.: 161 mg/dL (01-09-24 @ 08:24)  POCT Blood Glucose.: 147 mg/dL (01-09-24 @ 05:24)  POCT Blood Glucose.: 178 mg/dL (01-08-24 @ 23:45)  POCT Blood Glucose.: 167 mg/dL (01-08-24 @ 17:17)              11-25 Chol -- Direct LDL -- LDL calculated -- HDL -- Trig 192<H>  Thyroid Function Tests:  01-09 @ 06:46 TSH 1.66 FreeT4 1.4 T3 -- Anti TPO -- Anti Thyroglobulin Ab -- TSI --      A1C with Estimated Average Glucose Result: 7.5 % (10-28-23 @ 07:18)    Estimated Average Glucose: 169 mg/dL (10-28-23 @ 07:18)        Diet, NPO with Tube Feed:   Tube Feeding Modality: Gastrostomy  [facility] glucose support 1.2  Total Volume for 24 Hours (mL): 1200  Continuous  Starting Tube Feed Rate mL per Hour: 60  Increase Tube Feed Rate by (mL): 0  Until Goal Tube Feed Rate (mL per Hour): 60  Tube Feed Duration (in Hours): 20  Tube Feed Start Time: 06:00  Tube Feed Stop Time: 02:00  Free Water Flush  Pump   Rate (mL per Hour): 20   Frequency: Every 2 Hours  Alfred(7 Gm Arginine/7 Gm Glut/1.2 Gm HMB     Qty per Day:  2 (01-10-24 @ 17:19) [Active]              seen earlier today     Chief Complaint: Diabetes Mellitus follow up    INTERVAL HX: tolerating tube feeding, started on prednisone taper received 40mg x1 last night with bg above goal today      Review of Systems:  General: As above  GI: No nausea, vomiting  Endocrine: no  S&Sx of hypoglycemia    Allergies    penicillin (Unknown)  heparin (Unknown)  Tagamet (Unknown)  pineapple (Unknown)  walnut (Unknown)  Pecan, Filbert, Hazelnut (Unknown)  Lyrica (Unknown)    Intolerances      MEDICATIONS  (STANDING):  albuterol/ipratropium for Nebulization 3 milliLiter(s) Nebulizer every 8 hours  amLODIPine   Tablet 10 milliGRAM(s) Oral daily  artificial  tears Solution 2 Drop(s) Both EYES four times a day  ascorbic acid 500 milliGRAM(s) Oral daily  calcium carbonate    500 mG (Tums) Chewable 1 Tablet(s) Chew every 12 hours  chlorhexidine 0.12% Liquid 15 milliLiter(s) Oral Mucosa every 12 hours  chlorhexidine 4% Liquid 1 Application(s) Topical <User Schedule>  dextrose 50% Injectable 12.5 Gram(s) IV Push once  dextrose 50% Injectable 25 Gram(s) IV Push once  escitalopram 10 milliGRAM(s) Oral <User Schedule>  gabapentin Solution 250 milliGRAM(s) Enteral Tube at bedtime  glucagon  Injectable 1 milliGRAM(s) IntraMuscular once  hemorrhoidal Ointment      hemorrhoidal Ointment 1 Application(s) Rectal daily  insulin glargine Injectable (LANTUS) 19 Unit(s) SubCutaneous <User Schedule>  insulin lispro (ADMELOG) corrective regimen sliding scale   SubCutaneous every 6 hours  insulin regular  human recombinant 9 Unit(s) SubCutaneous <User Schedule>  insulin regular  human recombinant 17 Unit(s) SubCutaneous <User Schedule>  insulin regular  human recombinant 36 Unit(s) SubCutaneous <User Schedule>  levothyroxine 125 MICROGram(s) Oral <User Schedule>  lidocaine   4% Patch 1 Patch Transdermal daily  lidocaine   4% Patch 1 Patch Transdermal daily  lidocaine 2% Viscous 5 milliLiter(s) Mucosal two times a day  melatonin 5 milliGRAM(s) Oral at bedtime  melatonin 1 milliGRAM(s) Oral at bedtime  multivitamin/minerals/iron Oral Solution (CENTRUM) 15 milliLiter(s) Oral daily  nystatin Powder 1 Application(s) Topical every 8 hours  pantoprazole   Suspension 40 milliGRAM(s) Enteral Tube <User Schedule>  petrolatum Ophthalmic Ointment 1 Application(s) Both EYES four times a day  predniSONE  Solution 30 milliGRAM(s) Enteral Tube <User Schedule>  QUEtiapine 12.5 milliGRAM(s) Oral <User Schedule>  silver nitrate Applicator 1 Application(s) Topical once  simethicone 80 milliGRAM(s) Chew four times a day  zinc oxide 40% Paste 1 Application(s) Topical daily        insulin glargine Injectable (LANTUS)   19 Unit(s) SubCutaneous (01-11-24 @ 06:45)    insulin lispro (ADMELOG) corrective regimen sliding scale   10 Unit(s) SubCutaneous (01-11-24 @ 12:32)   10 Unit(s) SubCutaneous (01-11-24 @ 05:34)    insulin regular  human recombinant   9 Unit(s) SubCutaneous (01-10-24 @ 18:13)    insulin regular  human recombinant   17 Unit(s) SubCutaneous (01-11-24 @ 12:33)    insulin regular  human recombinant   36 Unit(s) SubCutaneous (01-11-24 @ 05:28)    levothyroxine   125 MICROGram(s) Oral (01-11-24 @ 05:11)    predniSONE  Solution   40 milliGRAM(s) Enteral Tube (01-11-24 @ 01:17)        PHYSICAL EXAM:  VITALS: T(C): 36.6 (01-11-24 @ 11:36)  T(F): 97.9 (01-11-24 @ 11:36), Max: 100.6 (01-11-24 @ 00:30)  HR: 88 (01-11-24 @ 11:36) (77 - 102)  BP: 108/53 (01-11-24 @ 11:36) (108/53 - 142/75)  RR:  (22 - 24)  SpO2:  (98% - 100%)  Wt(kg): --  GENERAL: NAD, peg with TF running at goal  Respiratory: Respirations unlabored  trach to vent  Extremities: Warm, trace LE edema  NEURO: Alert , appropriate nodding head yes/no to questions    LABS:  POCT Blood Glucose.: 393 mg/dL (01-11-24 @ 11:34)  POCT Blood Glucose.: 368 mg/dL (01-11-24 @ 05:14)  POCT Blood Glucose.: 148 mg/dL (01-11-24 @ 00:02)  POCT Blood Glucose.: 138 mg/dL (01-10-24 @ 17:14)  POCT Blood Glucose.: 192 mg/dL (01-10-24 @ 11:32)  POCT Blood Glucose.: 207 mg/dL (01-10-24 @ 08:38)  POCT Blood Glucose.: 153 mg/dL (01-10-24 @ 05:41)  POCT Blood Glucose.: 139 mg/dL (01-09-24 @ 23:48)  POCT Blood Glucose.: 186 mg/dL (01-09-24 @ 17:16)  POCT Blood Glucose.: 189 mg/dL (01-09-24 @ 12:02)  POCT Blood Glucose.: 161 mg/dL (01-09-24 @ 08:24)  POCT Blood Glucose.: 147 mg/dL (01-09-24 @ 05:24)  POCT Blood Glucose.: 178 mg/dL (01-08-24 @ 23:45)  POCT Blood Glucose.: 167 mg/dL (01-08-24 @ 17:17)              11-25 Chol -- Direct LDL -- LDL calculated -- HDL -- Trig 192<H>  Thyroid Function Tests:  01-09 @ 06:46 TSH 1.66 FreeT4 1.4 T3 -- Anti TPO -- Anti Thyroglobulin Ab -- TSI --      A1C with Estimated Average Glucose Result: 7.5 % (10-28-23 @ 07:18)    Estimated Average Glucose: 169 mg/dL (10-28-23 @ 07:18)        Diet, NPO with Tube Feed:   Tube Feeding Modality: Gastrostomy  [facility] glucose support 1.2  Total Volume for 24 Hours (mL): 1200  Continuous  Starting Tube Feed Rate mL per Hour: 60  Increase Tube Feed Rate by (mL): 0  Until Goal Tube Feed Rate (mL per Hour): 60  Tube Feed Duration (in Hours): 20  Tube Feed Start Time: 06:00  Tube Feed Stop Time: 02:00  Free Water Flush  Pump   Rate (mL per Hour): 20   Frequency: Every 2 Hours  Alfred(7 Gm Arginine/7 Gm Glut/1.2 Gm HMB     Qty per Day:  2 (01-10-24 @ 17:19) [Active]

## 2024-01-11 NOTE — PROGRESS NOTE ADULT - PROBLEM SELECTOR PLAN 1
- FS BG monitoring [id number] while on TFs/NPO   Hyperglycemia to 300s today due to steroid effect, initiated on steroid taper last night by primary team  - Increase lantus 23 units sc qAM   - Adjust Regular insulin to 42 units sc at 6am, c/w 20 units sc at 12 noon and 11 units sc at 6pm. PLEASE DO NOT HOLD IF PATIENT IS ON TUBE FEEDS (hold only if TF are stopped). Can decrease/adjust dose if there is a concern for hypoglycemia.   - Adjust to moderate dose admelog correction scale every 6 hours while on steroid taper   DISCHARGE:  If being dc on Prednisone 5mg qd and continuous TF as ordered : Lantus 19 units sq qam and HumuLIN R 36 units at 0600am , 17 units at 1200 noon, 9 units at 1800 WITHOUT admelog correction scale  Will need to reassess doses if being discharged on alternative steroid dose please confirm discharge recs with endocrine on day of discharge  - Needs telemedicine as outpatient due to bedbound status. Can follow up at [facility]. [address]. Phone .   -Make sure pt has Rx for all DM supplies and insulin. - FS BG monitoring [id number] while on TFs/NPO   Hyperglycemia to 300s today due to steroid effect, initiated on steroid taper last night by primary team  - Increase lantus 23 units sc qAM   - Adjust Regular insulin to 42 units sc at 6am, c/w 20 units sc at 12 noon and 11 units sc at 6pm. PLEASE DO NOT HOLD IF PATIENT IS ON TUBE FEEDS (hold only if TF are stopped). Can decrease/adjust dose if there is a concern for hypoglycemia.   - Adjust to moderate dose admelog correction scale every 6 hours while on steroid taper   DISCHARGE:  If being dc on Prednisone 5mg qd and continuous TF as ordered : Lantus 19 units sq qam and HumuLIN R 36 units at 0600am , 17 units at 1200 noon, 9 units at 1800 WITHOUT admelog correction scale  Will need to reassess doses if being discharged on alternative steroid dose please confirm discharge recs with endocrine on day of discharge  - Needs telemedicine as outpatient due to bedbound status. Can follow up at [facility] [place]. [address]. Phone .   -Make sure pt has Rx for all DM supplies and insulin.

## 2024-01-12 LAB
ANION GAP SERPL CALC-SCNC: 11 MMOL/L — SIGNIFICANT CHANGE UP (ref 5–17)
ANION GAP SERPL CALC-SCNC: 11 MMOL/L — SIGNIFICANT CHANGE UP (ref 5–17)
BUN SERPL-MCNC: 22 MG/DL — SIGNIFICANT CHANGE UP (ref 7–23)
BUN SERPL-MCNC: 22 MG/DL — SIGNIFICANT CHANGE UP (ref 7–23)
CALCIUM SERPL-MCNC: 10.3 MG/DL — SIGNIFICANT CHANGE UP (ref 8.4–10.5)
CALCIUM SERPL-MCNC: 10.3 MG/DL — SIGNIFICANT CHANGE UP (ref 8.4–10.5)
CHLORIDE SERPL-SCNC: 96 MMOL/L — SIGNIFICANT CHANGE UP (ref 96–108)
CHLORIDE SERPL-SCNC: 96 MMOL/L — SIGNIFICANT CHANGE UP (ref 96–108)
CO2 SERPL-SCNC: 28 MMOL/L — SIGNIFICANT CHANGE UP (ref 22–31)
CO2 SERPL-SCNC: 28 MMOL/L — SIGNIFICANT CHANGE UP (ref 22–31)
CREAT SERPL-MCNC: <0.3 MG/DL — LOW (ref 0.5–1.3)
CREAT SERPL-MCNC: <0.3 MG/DL — LOW (ref 0.5–1.3)
CRP SERPL-MCNC: 99 MG/L — HIGH (ref 0–4)
CRP SERPL-MCNC: 99 MG/L — HIGH (ref 0–4)
EGFR: 113 ML/MIN/1.73M2 — SIGNIFICANT CHANGE UP
EGFR: 113 ML/MIN/1.73M2 — SIGNIFICANT CHANGE UP
GLUCOSE BLDC GLUCOMTR-MCNC: 130 MG/DL — HIGH (ref 70–99)
GLUCOSE BLDC GLUCOMTR-MCNC: 130 MG/DL — HIGH (ref 70–99)
GLUCOSE BLDC GLUCOMTR-MCNC: 156 MG/DL — HIGH (ref 70–99)
GLUCOSE BLDC GLUCOMTR-MCNC: 156 MG/DL — HIGH (ref 70–99)
GLUCOSE BLDC GLUCOMTR-MCNC: 224 MG/DL — HIGH (ref 70–99)
GLUCOSE BLDC GLUCOMTR-MCNC: 224 MG/DL — HIGH (ref 70–99)
GLUCOSE BLDC GLUCOMTR-MCNC: 239 MG/DL — HIGH (ref 70–99)
GLUCOSE BLDC GLUCOMTR-MCNC: 239 MG/DL — HIGH (ref 70–99)
GLUCOSE SERPL-MCNC: 236 MG/DL — HIGH (ref 70–99)
GLUCOSE SERPL-MCNC: 236 MG/DL — HIGH (ref 70–99)
HCT VFR BLD CALC: 29.6 % — LOW (ref 34.5–45)
HCT VFR BLD CALC: 29.6 % — LOW (ref 34.5–45)
HGB BLD-MCNC: 8.7 G/DL — LOW (ref 11.5–15.5)
HGB BLD-MCNC: 8.7 G/DL — LOW (ref 11.5–15.5)
MAGNESIUM SERPL-MCNC: 2 MG/DL — SIGNIFICANT CHANGE UP (ref 1.6–2.6)
MAGNESIUM SERPL-MCNC: 2 MG/DL — SIGNIFICANT CHANGE UP (ref 1.6–2.6)
MCHC RBC-ENTMCNC: 26.3 PG — LOW (ref 27–34)
MCHC RBC-ENTMCNC: 26.3 PG — LOW (ref 27–34)
MCHC RBC-ENTMCNC: 29.4 GM/DL — LOW (ref 32–36)
MCHC RBC-ENTMCNC: 29.4 GM/DL — LOW (ref 32–36)
MCV RBC AUTO: 89.4 FL — SIGNIFICANT CHANGE UP (ref 80–100)
MCV RBC AUTO: 89.4 FL — SIGNIFICANT CHANGE UP (ref 80–100)
NRBC # BLD: 0 /100 WBCS — SIGNIFICANT CHANGE UP (ref 0–0)
NRBC # BLD: 0 /100 WBCS — SIGNIFICANT CHANGE UP (ref 0–0)
PHOSPHATE SERPL-MCNC: 3.7 MG/DL — SIGNIFICANT CHANGE UP (ref 2.5–4.5)
PHOSPHATE SERPL-MCNC: 3.7 MG/DL — SIGNIFICANT CHANGE UP (ref 2.5–4.5)
PLATELET # BLD AUTO: 202 K/UL — SIGNIFICANT CHANGE UP (ref 150–400)
PLATELET # BLD AUTO: 202 K/UL — SIGNIFICANT CHANGE UP (ref 150–400)
POTASSIUM SERPL-MCNC: 4.4 MMOL/L — SIGNIFICANT CHANGE UP (ref 3.5–5.3)
POTASSIUM SERPL-MCNC: 4.4 MMOL/L — SIGNIFICANT CHANGE UP (ref 3.5–5.3)
POTASSIUM SERPL-SCNC: 4.4 MMOL/L — SIGNIFICANT CHANGE UP (ref 3.5–5.3)
POTASSIUM SERPL-SCNC: 4.4 MMOL/L — SIGNIFICANT CHANGE UP (ref 3.5–5.3)
RBC # BLD: 3.31 M/UL — LOW (ref 3.8–5.2)
RBC # BLD: 3.31 M/UL — LOW (ref 3.8–5.2)
RBC # FLD: 17.8 % — HIGH (ref 10.3–14.5)
RBC # FLD: 17.8 % — HIGH (ref 10.3–14.5)
SODIUM SERPL-SCNC: 135 MMOL/L — SIGNIFICANT CHANGE UP (ref 135–145)
SODIUM SERPL-SCNC: 135 MMOL/L — SIGNIFICANT CHANGE UP (ref 135–145)
WBC # BLD: 8.12 K/UL — SIGNIFICANT CHANGE UP (ref 3.8–10.5)
WBC # BLD: 8.12 K/UL — SIGNIFICANT CHANGE UP (ref 3.8–10.5)
WBC # FLD AUTO: 8.12 K/UL — SIGNIFICANT CHANGE UP (ref 3.8–10.5)
WBC # FLD AUTO: 8.12 K/UL — SIGNIFICANT CHANGE UP (ref 3.8–10.5)

## 2024-01-12 PROCEDURE — 99232 SBSQ HOSP IP/OBS MODERATE 35: CPT

## 2024-01-12 PROCEDURE — 99233 SBSQ HOSP IP/OBS HIGH 50: CPT

## 2024-01-12 RX ORDER — KETOROLAC TROMETHAMINE 30 MG/ML
15 SYRINGE (ML) INJECTION ONCE
Refills: 0 | Status: DISCONTINUED | OUTPATIENT
Start: 2024-01-12 | End: 2024-01-12

## 2024-01-12 RX ORDER — INSULIN GLARGINE 100 [IU]/ML
21 INJECTION, SOLUTION SUBCUTANEOUS
Refills: 0 | Status: DISCONTINUED | OUTPATIENT
Start: 2024-01-12 | End: 2024-01-13

## 2024-01-12 RX ORDER — OXYCODONE HYDROCHLORIDE 5 MG/1
2.5 TABLET ORAL ONCE
Refills: 0 | Status: DISCONTINUED | OUTPATIENT
Start: 2024-01-12 | End: 2024-01-12

## 2024-01-12 RX ADMIN — INSULIN HUMAN 42 UNIT(S): 100 INJECTION, SOLUTION SUBCUTANEOUS at 05:19

## 2024-01-12 RX ADMIN — CHLORHEXIDINE GLUCONATE 15 MILLILITER(S): 213 SOLUTION TOPICAL at 18:07

## 2024-01-12 RX ADMIN — OXYCODONE HYDROCHLORIDE 2.5 MILLIGRAM(S): 5 TABLET ORAL at 11:00

## 2024-01-12 RX ADMIN — OXYCODONE HYDROCHLORIDE 5 MILLIGRAM(S): 5 TABLET ORAL at 15:29

## 2024-01-12 RX ADMIN — Medication 15 MILLIGRAM(S): at 18:42

## 2024-01-12 RX ADMIN — INSULIN GLARGINE 23 UNIT(S): 100 INJECTION, SOLUTION SUBCUTANEOUS at 05:19

## 2024-01-12 RX ADMIN — Medication 1 TABLET(S): at 05:23

## 2024-01-12 RX ADMIN — LIDOCAINE 1 PATCH: 4 CREAM TOPICAL at 13:54

## 2024-01-12 RX ADMIN — Medication 125 MICROGRAM(S): at 05:23

## 2024-01-12 RX ADMIN — LIDOCAINE 1 PATCH: 4 CREAM TOPICAL at 19:32

## 2024-01-12 RX ADMIN — QUETIAPINE FUMARATE 12.5 MILLIGRAM(S): 200 TABLET, FILM COATED ORAL at 18:04

## 2024-01-12 RX ADMIN — Medication 1 APPLICATION(S): at 12:10

## 2024-01-12 RX ADMIN — Medication 1 MILLIGRAM(S): at 22:28

## 2024-01-12 RX ADMIN — Medication 2 DROP(S): at 05:25

## 2024-01-12 RX ADMIN — Medication 500 MILLIGRAM(S): at 12:10

## 2024-01-12 RX ADMIN — DICLOFENAC SODIUM 2 GRAM(S): 30 GEL TOPICAL at 06:38

## 2024-01-12 RX ADMIN — OXYCODONE HYDROCHLORIDE 2.5 MILLIGRAM(S): 5 TABLET ORAL at 10:25

## 2024-01-12 RX ADMIN — GABAPENTIN 100 MILLIGRAM(S): 400 CAPSULE ORAL at 13:53

## 2024-01-12 RX ADMIN — Medication 3 MILLILITER(S): at 14:52

## 2024-01-12 RX ADMIN — Medication 3 MILLILITER(S): at 05:19

## 2024-01-12 RX ADMIN — OXYCODONE HYDROCHLORIDE 5 MILLIGRAM(S): 5 TABLET ORAL at 06:19

## 2024-01-12 RX ADMIN — NYSTATIN CREAM 1 APPLICATION(S): 100000 CREAM TOPICAL at 22:27

## 2024-01-12 RX ADMIN — GABAPENTIN 250 MILLIGRAM(S): 400 CAPSULE ORAL at 22:28

## 2024-01-12 RX ADMIN — Medication 2 DROP(S): at 18:05

## 2024-01-12 RX ADMIN — LIDOCAINE 1 PATCH: 4 CREAM TOPICAL at 04:32

## 2024-01-12 RX ADMIN — ZINC OXIDE 1 APPLICATION(S): 200 OINTMENT TOPICAL at 18:05

## 2024-01-12 RX ADMIN — Medication 2: at 00:08

## 2024-01-12 RX ADMIN — CHLORHEXIDINE GLUCONATE 1 APPLICATION(S): 213 SOLUTION TOPICAL at 05:25

## 2024-01-12 RX ADMIN — LIDOCAINE 1 PATCH: 4 CREAM TOPICAL at 19:31

## 2024-01-12 RX ADMIN — OXYCODONE HYDROCHLORIDE 5 MILLIGRAM(S): 5 TABLET ORAL at 07:08

## 2024-01-12 RX ADMIN — Medication 4: at 12:11

## 2024-01-12 RX ADMIN — NYSTATIN CREAM 1 APPLICATION(S): 100000 CREAM TOPICAL at 05:24

## 2024-01-12 RX ADMIN — Medication 2 DROP(S): at 12:10

## 2024-01-12 RX ADMIN — Medication 4: at 18:06

## 2024-01-12 RX ADMIN — Medication 1 TABLET(S): at 18:04

## 2024-01-12 RX ADMIN — AMLODIPINE BESYLATE 10 MILLIGRAM(S): 2.5 TABLET ORAL at 05:23

## 2024-01-12 RX ADMIN — PANTOPRAZOLE SODIUM 40 MILLIGRAM(S): 20 TABLET, DELAYED RELEASE ORAL at 08:50

## 2024-01-12 RX ADMIN — NYSTATIN CREAM 1 APPLICATION(S): 100000 CREAM TOPICAL at 14:16

## 2024-01-12 RX ADMIN — Medication 15 MILLIGRAM(S): at 18:04

## 2024-01-12 RX ADMIN — Medication 1 APPLICATION(S): at 18:07

## 2024-01-12 RX ADMIN — PHENYLEPHRINE-SHARK LIVER OIL-MINERAL OIL-PETROLATUM RECTAL OINTMENT 1 APPLICATION(S): at 12:12

## 2024-01-12 RX ADMIN — LIDOCAINE 5 MILLILITER(S): 4 CREAM TOPICAL at 18:07

## 2024-01-12 RX ADMIN — Medication 1 APPLICATION(S): at 05:25

## 2024-01-12 RX ADMIN — INSULIN HUMAN 11 UNIT(S): 100 INJECTION, SOLUTION SUBCUTANEOUS at 18:06

## 2024-01-12 RX ADMIN — SIMETHICONE 80 MILLIGRAM(S): 80 TABLET, CHEWABLE ORAL at 05:24

## 2024-01-12 RX ADMIN — Medication 3 MILLILITER(S): at 22:19

## 2024-01-12 RX ADMIN — GABAPENTIN 100 MILLIGRAM(S): 400 CAPSULE ORAL at 05:21

## 2024-01-12 RX ADMIN — Medication 15 MILLILITER(S): at 12:09

## 2024-01-12 RX ADMIN — CHLORHEXIDINE GLUCONATE 15 MILLILITER(S): 213 SOLUTION TOPICAL at 05:24

## 2024-01-12 RX ADMIN — INSULIN HUMAN 20 UNIT(S): 100 INJECTION, SOLUTION SUBCUTANEOUS at 12:11

## 2024-01-12 RX ADMIN — SIMETHICONE 80 MILLIGRAM(S): 80 TABLET, CHEWABLE ORAL at 12:10

## 2024-01-12 RX ADMIN — ESCITALOPRAM OXALATE 10 MILLIGRAM(S): 10 TABLET, FILM COATED ORAL at 08:50

## 2024-01-12 RX ADMIN — Medication 30 MILLIGRAM(S): at 05:23

## 2024-01-12 RX ADMIN — LIDOCAINE 1 PATCH: 4 CREAM TOPICAL at 13:53

## 2024-01-12 RX ADMIN — Medication 5 MILLIGRAM(S): at 22:28

## 2024-01-12 RX ADMIN — LIDOCAINE 5 MILLILITER(S): 4 CREAM TOPICAL at 05:24

## 2024-01-12 RX ADMIN — SIMETHICONE 80 MILLIGRAM(S): 80 TABLET, CHEWABLE ORAL at 18:04

## 2024-01-12 NOTE — PROGRESS NOTE ADULT - SUBJECTIVE AND OBJECTIVE BOX
Patient is a 74y old  Female who presents with a chief complaint of Bleeding from PEG (10 Noe 2024 15:43)      Interval Events:    REVIEW OF SYSTEMS:  [ ] Positive  [ ] All other systems negative  [ ] Unable to assess ROS because ________    Vital Signs Last 24 Hrs  T(C): 36.6 (01-12-24 @ 05:09), Max: 37.1 (01-11-24 @ 21:13)  T(F): 97.9 (01-12-24 @ 05:09), Max: 98.8 (01-11-24 @ 21:13)  HR: 93 (01-12-24 @ 05:09) (75 - 93)  BP: 120/57 (01-12-24 @ 05:09) (108/53 - 130/52)  RR: 24 (01-11-24 @ 21:13) (22 - 24)  SpO2: 98% (01-12-24 @ 05:09) (98% - 100%)    PHYSICAL EXAM:  HEENT:   [ ]Tracheostomy:  [ ]Pupils equal  [ ]No oral lesions  [ ]Abnormal    SKIN  [ ]No Rash  [ ] Abnormal  [ ] pressure    CARDIAC  [ ]Regular  [ ]Abnormal    PULMONARY  [ ]Bilateral Clear Breath Sounds  [ ]Normal Excursion  [ ]Abnormal    GI  [ ]PEG      [ ] +BS		              [ ]Soft, nondistended, nontender	  [ ]Abnormal    MUSCULOSKELETAL                                   [ ]Bedbound                 [ ]Abnormal    [ ]Ambulatory/OOB to chair                           EXTREMITIES                                         [ ]Normal  [ ]Edema                           NEUROLOGIC  [ ] Normal, non focal  [ ] Focal findings:    PSYCHIATRIC  [ ]Alert and appropriate  [ ] Sedated	 [ ]Agitated    :  Mcbride: [ ] Yes, if yes: Date of Placement:                   [  ] No    LINES: Central Lines [ ] Yes, if yes: Date of Placement                                     [  ] No    HOSPITAL MEDICATIONS:  MEDICATIONS  (STANDING):  albuterol/ipratropium for Nebulization 3 milliLiter(s) Nebulizer every 8 hours  amLODIPine   Tablet 10 milliGRAM(s) Oral daily  artificial  tears Solution 2 Drop(s) Both EYES four times a day  ascorbic acid 500 milliGRAM(s) Oral daily  calcium carbonate    500 mG (Tums) Chewable 1 Tablet(s) Chew every 12 hours  chlorhexidine 0.12% Liquid 15 milliLiter(s) Oral Mucosa every 12 hours  chlorhexidine 4% Liquid 1 Application(s) Topical <User Schedule>  dextrose 50% Injectable 25 Gram(s) IV Push once  dextrose 50% Injectable 12.5 Gram(s) IV Push once  escitalopram 10 milliGRAM(s) Oral <User Schedule>  gabapentin Solution 100 milliGRAM(s) Oral <User Schedule>  gabapentin Solution 250 milliGRAM(s) Enteral Tube at bedtime  glucagon  Injectable 1 milliGRAM(s) IntraMuscular once  hemorrhoidal Ointment      hemorrhoidal Ointment 1 Application(s) Rectal daily  insulin glargine Injectable (LANTUS) 23 Unit(s) SubCutaneous <User Schedule>  insulin lispro (ADMELOG) corrective regimen sliding scale   SubCutaneous every 6 hours  insulin regular  human recombinant 11 Unit(s) SubCutaneous <User Schedule>  insulin regular  human recombinant 20 Unit(s) SubCutaneous <User Schedule>  insulin regular  human recombinant 42 Unit(s) SubCutaneous <User Schedule>  levothyroxine 125 MICROGram(s) Oral <User Schedule>  lidocaine   4% Patch 1 Patch Transdermal daily  lidocaine   4% Patch 1 Patch Transdermal daily  lidocaine 2% Viscous 5 milliLiter(s) Mucosal two times a day  melatonin 5 milliGRAM(s) Oral at bedtime  melatonin 1 milliGRAM(s) Oral at bedtime  multivitamin/minerals/iron Oral Solution (CENTRUM) 15 milliLiter(s) Oral daily  nystatin Powder 1 Application(s) Topical every 8 hours  pantoprazole   Suspension 40 milliGRAM(s) Enteral Tube <User Schedule>  petrolatum Ophthalmic Ointment 1 Application(s) Both EYES four times a day  QUEtiapine 12.5 milliGRAM(s) Oral <User Schedule>  silver nitrate Applicator 1 Application(s) Topical once  simethicone 80 milliGRAM(s) Chew four times a day  zinc oxide 40% Paste 1 Application(s) Topical daily    MEDICATIONS  (PRN):  acetaminophen   Oral Liquid .. 1000 milliGRAM(s) Oral every 6 hours PRN Temp greater or equal to 38.5C (101.3F)  benzocaine 20% Spray 1 Spray(s) Topical four times a day PRN Cough  diclofenac sodium 1% Gel 2 Gram(s) Topical four times a day PRN pain  diphenhydrAMINE Elixir 25 milliGRAM(s) Oral every 6 hours PRN Rash and/or Itching  guaifenesin/dextromethorphan Oral Liquid 10 milliLiter(s) Oral every 6 hours PRN Cough  lidocaine   4% Patch 2 Patch Transdermal daily PRN shoulder pain  oxyCODONE    Solution 5 milliGRAM(s) Oral every 6 hours PRN Moderate Pain (4 - 6)  sodium chloride 0.65% Nasal 1 Spray(s) Both Nostrils every 6 hours PRN Nasal Congestion      LABS:                  CAPILLARY BLOOD GLUCOSE    MICROBIOLOGY:     RADIOLOGY:  [ ] Reviewed and interpreted by me    Mode: AC/ CMV (Assist Control/ Continuous Mandatory Ventilation)  RR (machine): 12  TV (machine): 300  FiO2: 30  PEEP: 5  ITime: 1  MAP: 11  PIP: 28   Patient is a 74y old  Female who presents with a chief complaint of Bleeding from PEG (10 Noe 2024 15:43)      Interval Events: No events reported over night.     REVIEW OF SYSTEMS:  [ ] Positive  [ ] All other systems negative  [X] Unable to assess ROS because ___No events reported over night.     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  [ ]Ambulatory/OOB to chair                           EXTREMITIES                                         [X]Normal  [ ]Edema                           NEUROLOGIC  [ ] Normal, non focal  [X] Focal findings: opens eyes spontaneously, follows commands on all 4 extremities    PSYCHIATRIC  [X]Alert and appropriate  [ ] Sedated	 [ ]Agitated    :  Mcbride: [ ] Yes, if yes: Date of Placement:                   [X] No    LINES: Central Lines [ ] Yes, if yes: Date of Placement                                     [X] No        HOSPITAL MEDICATIONS:  MEDICATIONS  (STANDING):  albuterol/ipratropium for Nebulization 3 milliLiter(s) Nebulizer every 8 hours  amLODIPine   Tablet 10 milliGRAM(s) Oral daily  artificial  tears Solution 2 Drop(s) Both EYES four times a day  ascorbic acid 500 milliGRAM(s) Oral daily  calcium carbonate    500 mG (Tums) Chewable 1 Tablet(s) Chew every 12 hours  chlorhexidine 0.12% Liquid 15 milliLiter(s) Oral Mucosa every 12 hours  chlorhexidine 4% Liquid 1 Application(s) Topical <User Schedule>  dextrose 50% Injectable 25 Gram(s) IV Push once  dextrose 50% Injectable 12.5 Gram(s) IV Push once  escitalopram 10 milliGRAM(s) Oral <User Schedule>  gabapentin Solution 100 milliGRAM(s) Oral <User Schedule>  gabapentin Solution 250 milliGRAM(s) Enteral Tube at bedtime  glucagon  Injectable 1 milliGRAM(s) IntraMuscular once  hemorrhoidal Ointment      hemorrhoidal Ointment 1 Application(s) Rectal daily  insulin glargine Injectable (LANTUS) 23 Unit(s) SubCutaneous <User Schedule>  insulin lispro (ADMELOG) corrective regimen sliding scale   SubCutaneous every 6 hours  insulin regular  human recombinant 11 Unit(s) SubCutaneous <User Schedule>  insulin regular  human recombinant 20 Unit(s) SubCutaneous <User Schedule>  insulin regular  human recombinant 42 Unit(s) SubCutaneous <User Schedule>  levothyroxine 125 MICROGram(s) Oral <User Schedule>  lidocaine   4% Patch 1 Patch Transdermal daily  lidocaine   4% Patch 1 Patch Transdermal daily  lidocaine 2% Viscous 5 milliLiter(s) Mucosal two times a day  melatonin 5 milliGRAM(s) Oral at bedtime  melatonin 1 milliGRAM(s) Oral at bedtime  multivitamin/minerals/iron Oral Solution (CENTRUM) 15 milliLiter(s) Oral daily  nystatin Powder 1 Application(s) Topical every 8 hours  pantoprazole   Suspension 40 milliGRAM(s) Enteral Tube <User Schedule>  petrolatum Ophthalmic Ointment 1 Application(s) Both EYES four times a day  QUEtiapine 12.5 milliGRAM(s) Oral <User Schedule>  silver nitrate Applicator 1 Application(s) Topical once  simethicone 80 milliGRAM(s) Chew four times a day  zinc oxide 40% Paste 1 Application(s) Topical daily    MEDICATIONS  (PRN):  acetaminophen   Oral Liquid .. 1000 milliGRAM(s) Oral every 6 hours PRN Temp greater or equal to 38.5C (101.3F)  benzocaine 20% Spray 1 Spray(s) Topical four times a day PRN Cough  diclofenac sodium 1% Gel 2 Gram(s) Topical four times a day PRN pain  diphenhydrAMINE Elixir 25 milliGRAM(s) Oral every 6 hours PRN Rash and/or Itching  guaifenesin/dextromethorphan Oral Liquid 10 milliLiter(s) Oral every 6 hours PRN Cough  lidocaine   4% Patch 2 Patch Transdermal daily PRN shoulder pain  oxyCODONE    Solution 5 milliGRAM(s) Oral every 6 hours PRN Moderate Pain (4 - 6)  sodium chloride 0.65% Nasal 1 Spray(s) Both Nostrils every 6 hours PRN Nasal Congestion      LABS:                  CAPILLARY BLOOD GLUCOSE    MICROBIOLOGY:     RADIOLOGY:  [ ] Reviewed and interpreted by me    Mode: AC/ CMV (Assist Control/ Continuous Mandatory Ventilation)  RR (machine): 12  TV (machine): 300  FiO2: 30  PEEP: 5  ITime: 1  MAP: 11  PIP: 28
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
## 2024-01-12 NOTE — CONSULT NOTE ADULT - CONSULT REQUESTED DATE/TIME
04-Dec-2023 14:00
27-Nov-2023 18:00
30-Oct-2023 16:17
Maggie Roland    [facility] [id number]      March 15, 2017       : 1959    Estimado(a) Ms. Watkins:    Hemos intentado comunicarnos con usted y no nos ha sido posible.  Favor de llamar a mi oficina al [phone] de Lunes - [place] 8:00 am to 5:00 pm        Paco por prasad respuesta inmediata.    Sinceramente,    Brien Garcia MD    
24-Nov-2023 10:33
29-Oct-2023 09:21
29-Oct-2023 19:12
14-Nov-2023 22:05
12-Jan-2024 13:15
22-Nov-2023
17-Nov-2023
01-Nov-2023 15:04
28-Oct-2023

## 2024-01-12 NOTE — PROGRESS NOTE ADULT - PROBLEM SELECTOR PLAN 7
- Continue home Prednisone regimen 5 mg daily   *fibromyalgia  - continue home Fentanyl 25mcg Q72H patches  - c/w Lidocaine patch   - Oxycodone 2.5mg q6 hrs PRN (in addition to Tylenol PRN)  - 1/7-1/8 Right forearm and BL shoulder pain - w/u negative for DVT, imaging negative for acute fracture/dislocation, will continue with pain management as needed with meds above  - 1/9 increased gabapentin to 250mg PO, oxycodone increased to 5mg  -1/10: Gabapentin 100mg 6AM 2PM and 250m HD - Continue home Prednisone regimen 5 mg daily   *fibromyalgia  - continue home Fentanyl 25mcg Q72H patches  - c/w Lidocaine patch   - Oxycodone 2.5mg q6 hrs PRN (in addition to Tylenol PRN)  - 1/7-1/8 Right forearm and BL shoulder pain - w/u negative for DVT, imaging negative for acute fracture/dislocation, will continue with pain management as needed with meds above  - 1/9 increased gabapentin to 250mg PO, oxycodone increased to 5mg  -1/10: Gabapentin 100mg 6AM 2PM and 250mg HS

## 2024-01-12 NOTE — CONSULT NOTE ADULT - PROVIDER SPECIALTY LIST ADULT
Endocrinology
Gastroenterology
Orthopedics
Dermatology
ENT
Heme/Onc
Wound Care
ENT
Infectious Disease
Internal Medicine
Dental
Palliative Care

## 2024-01-12 NOTE — PROGRESS NOTE ADULT - PROBLEM SELECTOR PLAN 11
GOC: Long discussion of RCU team with daughter, pt remains FULL CODE - 1/12:  Code status: DNR/DNI.  Patient gave verbal consent at bedside to be DNR/DNI and did not want chest compressions performed if her heart were to stop.  Decision made in front of daughter and .  MOLST in chart and copy given to daughter.

## 2024-01-12 NOTE — CONSULT NOTE ADULT - ASSESSMENT
74y Female with complex past medical history including RA, now with 1 week of  progressing Left shoulder pain. On exam she has exquisite TTP throughout her shoulder and glenohumeral joint and limited ROM 2/2 pain. Unclear etiology of pain at this point. Differential diagnosis include RA flare, OA flare, GH arthropathy, biceps tendonitis, septic arthritis.    -Please obtain: CBC, BMP, Blood Cultures, ESR/CRP, Blood cultures  -Recommend toradol challenge  -Multimodal pain pain control: Tylenol, oxy, prn dilaudid  -Remainder of care per primary team  -Will discuss with attending, and advise if plan changes    Orthopaedic Surgery  [facility] [id number]  [facility]        [id number]  [facility]  p1409/1337/ [phone]   74y Female with complex past medical history including RA, now with 1 week of  progressing Left shoulder pain. On exam she has exquisite TTP throughout her shoulder and glenohumeral joint and limited ROM 2/2 pain. Unclear etiology of pain at this point. Differential diagnosis include RA flare, OA flare, GH arthropathy, biceps tendonitis, septic arthritis.    -Please obtain: CBC, BMP, Blood Cultures, ESR/CRP, Blood cultures  -Recommend toradol challenge  -Multimodal pain pain control: Tylenol, oxy, prn dilaudid  -Remainder of care per primary team  -Will discuss with attending, and advise if plan changes    Orthopaedic Surgery  [facility] [id number]  [facility]        [id number]  [facility]  p1409/1337/ [phone]   74y Female with complex past medical history including RA, now with 1 week of  progressing Left shoulder pain. On exam she has exquisite TTP throughout her shoulder and glenohumeral joint and limited ROM 2/2 pain. Unclear etiology of pain at this point. Differential diagnosis include RA flare, OA flare, GH arthropathy, biceps tendonitis, septic arthritis.    -Please obtain: CBC, BMP, Blood Cultures, ESR/CRP, Blood cultures  -Recommend toradol challenge  -Multimodal pain pain control: Tylenol, oxy, prn dilaudid  -Remainder of care per primary team  -Will discuss with attending, and advise if plan changes    Orthopaedic Surgery  [facility] [id number]  [facility]        [id number]  [facility]  p1409/1337/ [phone]   74y Female with complex past medical history including RA, now with 1 week of  progressing Left shoulder pain. On exam she has exquisite TTP throughout her shoulder and glenohumeral joint and limited ROM 2/2 pain. Unclear etiology of pain at this point. Differential diagnosis include RA flare, OA flare, GH arthropathy, biceps tendonitis, septic arthritis (though less likely).    -Please obtain: CBC, BMP, Blood Cultures, ESR/CRP, Blood cultures  - If   -Recommend toradol challenge  -Multimodal pain pain control: Tylenol, oxy, prn dilaudid  -Remainder of care per primary team  -Will discuss with attending, and advise if plan changes    Orthopaedic Surgery  [facility] [id number]  [facility]        [id number]  [facility]  p1409/1337/ [phone]   74y Female with complex past medical history including RA, now with 1 week of  progressing Left shoulder pain. On exam she has exquisite TTP throughout her shoulder and glenohumeral joint and limited ROM 2/2 pain. Unclear etiology of pain at this point. Differential diagnosis include RA flare, OA flare, GH arthropathy, biceps tendonitis, septic arthritis (though less likely).    -Please obtain: CBC, BMP, Blood Cultures, ESR/CRP, Blood cultures  - If   -Recommend toradol challenge  -Multimodal pain pain control: Tylenol, oxy, prn dilaudid  -Remainder of care per primary team  -Will discuss with attending, and advise if plan changes    Orthopaedic Surgery  [facility] [id number]  [facility]        [id number]  [facility]  p1409/1337/ [phone]   74y Female with complex past medical history including RA, now with 1 week of  progressing Left shoulder pain. On exam she has exquisite TTP throughout her shoulder and glenohumeral joint and limited ROM 2/2 pain. Unclear etiology of pain at this point. Differential diagnosis include RA flare, OA flare, GH arthropathy, biceps tendonitis, septic arthritis (though less likely).    -Please obtain: CBC, BMP, Blood Cultures, ESR/CRP, Blood cultures  - If   -Recommend toradol challenge  -Multimodal pain pain control: Tylenol, oxy, prn dilaudid  -Remainder of care per primary team  -Will discuss with attending, and advise if plan changes    Orthopaedic Surgery  [facility] [id number]  [facility]        [id number]  [facility]  p1409/1337/ [phone]   74y Female with complex past medical history including RA, now with 1 week of  progressing Left shoulder pain. On exam she has exquisite TTP throughout her shoulder and glenohumeral joint and limited ROM 2/2 pain. Unclear etiology of pain at this point. Differential diagnosis include RA flare, OA flare, GH arthropathy, biceps tendonitis, septic arthritis (though less likely).    -Please obtain: CBC, BMP, Blood Cultures, ESR/CRP, Blood cultures  - If inflammatory markers elevated, patient will need an MRI Right shoulder to evaluate for effusion     This will further determine the likelihood of infection and need for arthrocentesis  -Recommend toradol challenge  -Multimodal pain pain control: Tylenol, oxy, prn dilaudid  -Remainder of care per primary team  -Discussed with attending surgeon on call    Orthopaedic Surgery  [facility] [id number]  J        [id number]  [facility]  p1408/1337/ [phone]   74y Female with complex past medical history including RA, now with 1 week of  progressing Left shoulder pain. On exam she has exquisite TTP throughout her shoulder and glenohumeral joint and limited ROM 2/2 pain. Unclear etiology of pain at this point. Differential diagnosis include RA flare, OA flare, GH arthropathy, biceps tendonitis, septic arthritis (though less likely).    -Please obtain: CBC, BMP, Blood Cultures, ESR/CRP, Blood cultures  - If inflammatory markers elevated, patient will need an MRI Right shoulder to evaluate for effusion     This will further determine the likelihood of infection and need for arthrocentesis  -Recommend toradol challenge  -Multimodal pain pain control: Tylenol, oxy, prn dilaudid  -Remainder of care per primary team  -Discussed with attending surgeon on call    Orthopaedic Surgery  [facility] [id number]  J        [id number]  [facility]  p1441/1337/ [phone]   74y Female with complex past medical history including RA, now with 1 week of  progressing Left shoulder pain. On exam she has exquisite TTP throughout her shoulder and glenohumeral joint and limited ROM 2/2 pain. Unclear etiology of pain at this point. Differential diagnosis include RA flare, OA flare, GH arthropathy, biceps tendonitis, septic arthritis (though less likely).    -Please obtain: CBC, BMP, Blood Cultures, ESR/CRP, Blood cultures  - If inflammatory markers elevated, patient will need an MRI Right shoulder to evaluate for effusion     This will further determine the likelihood of infection and need for arthrocentesis  -Recommend toradol challenge  -Multimodal pain pain control: Tylenol, oxy, prn dilaudid  -Remainder of care per primary team  -Discussed with attending surgeon on call    [place]opaedic Surgery  [facility] [id number]  [place]        [id number]  [facility]  p1433/1337/ [phone]   74y Female with complex past medical history including RA, now with 1 week of  progressing Left shoulder pain. On exam she has exquisite TTP throughout her shoulder and glenohumeral joint and limited ROM 2/2 pain. Unclear etiology of pain at this point. Differential diagnosis include RA flare, OA flare, GH arthropathy, biceps tendonitis, septic arthritis (though less likely).    -Please obtain: CBC, BMP, Blood Cultures, ESR/CRP, Blood cultures  - If inflammatory markers elevated, patient will need an MRI Right shoulder to evaluate for effusion     This will further determine the likelihood of infection and need for arthrocentesis     CRP 99 ---> Please obtain MRI Right shoulder   -Recommend toradol challenge  -Multimodal pain pain control: Tylenol, oxy, prn dilaudid  -Remainder of care per primary team  -Discussed with attending surgeon on call    Orthopaedic Surgery  [facility] [id number]  J        [id number]  [facility]  p1409/1337/ [phone]   74y Female with complex past medical history including RA, now with 1 week of  progressing Left shoulder pain. On exam she has exquisite TTP throughout her shoulder and glenohumeral joint and limited ROM 2/2 pain. Unclear etiology of pain at this point. Differential diagnosis include RA flare, OA flare, GH arthropathy, biceps tendonitis, septic arthritis (though less likely).    -Please obtain: CBC, BMP, Blood Cultures, ESR/CRP, Blood cultures  - If inflammatory markers elevated, patient will need an MRI Right shoulder to evaluate for effusion     This will further determine the likelihood of infection and need for arthrocentesis     CRP 99 ---> Please obtain MRI Right shoulder   -Recommend toradol challenge  -Multimodal pain pain control: Tylenol, oxy, prn dilaudid  -Remainder of care per primary team  -Discussed with attending surgeon on call    Orthopaedic Surgery  [facility] [id number]  J        [id number]  [facility]  p1409/1337/ [phone]   74y Female with complex past medical history including RA, now with 1 week of  progressing Left shoulder pain. On exam she has exquisite TTP throughout her shoulder and glenohumeral joint and limited ROM 2/2 pain. Unclear etiology of pain at this point. Differential diagnosis include RA flare, OA flare, GH arthropathy, biceps tendonitis, septic arthritis (though less likely).    -Please obtain: CBC, BMP, Blood Cultures, ESR/CRP, Blood cultures  - If inflammatory markers elevated, patient will need an MRI Right shoulder to evaluate for effusion     This will further determine the likelihood of infection and need for arthrocentesis     CRP 99 ---> Please obtain MRI Right shoulder   -Recommend toradol challenge  -Multimodal pain pain control: Tylenol, oxy, prn dilaudid  -Remainder of care per primary team  -Discussed with attending surgeon on call    Orthopaedic Surgery  [facility] [id number]  J        [id number]  [facility]  p1409/1337/ [phone]

## 2024-01-12 NOTE — PROGRESS NOTE ADULT - PROBLEM SELECTOR PLAN 1
- FS BG monitoring [id number] while on TFs/NPO   - Change lantus dose to 21 units sc qAM   - C/w Regular insulin 42 units sc at 6am, c/w 20 units sc at 12 noon and 11 units sc at 6pm. PLEASE DO NOT HOLD IF PATIENT IS ON TUBE FEEDS (hold only if TF are stopped). Can decrease/adjust dose if there is a concern for hypoglycemia.   - C/w low dose admelog correction scale every 6 hours  - Will follow and adjust insulin as needed  -Please infor endo if any changes to steroid doses from the taper noted above  DISCHARGE:  - Will be determined according to BG/insulin needs/TFs and steroid therapy at time of discharge. If discharge within next 24 hours will continue with same insulin types and doses as noted above with the exception of Admelog correction scale.   - Needs telemedicine as outpatient due to bedbound status. Can follow up at endo practice. [address] [facility] [address] Phone .   -Make sure pt has Rx for all DM supplies and insulin. - FS BG monitoring [id number] while on TFs/NPO   - Change lantus dose to 21 units sc qAM   - C/w Regular insulin 42 units sc at 6am, c/w 20 units sc at 12 noon and 11 units sc at 6pm. PLEASE DO NOT HOLD IF PATIENT IS ON TUBE FEEDS (hold only if TF are stopped). Can decrease/adjust dose if there is a concern for hypoglycemia.   - C/w low dose admelog correction scale every 6 hours  - Will follow and adjust insulin as needed  -Please infor endo if any changes to steroid doses from the taper noted above  DISCHARGE:  - Will be determined according to BG/insulin needs/TFs and steroid therapy at time of discharge. If discharge within next 24 hours will continue with same insulin types and doses as noted above with the exception of Admelog correction scale.   - Needs telemedicine as outpatient due to bedbound status. Can follow up at endo practice. [address]. Phone .   -Make sure pt has Rx for all DM supplies and insulin.

## 2024-01-12 NOTE — PROGRESS NOTE ADULT - ASSESSMENT
74 y/o F w/h/o T2DM with unknown control due to anemia of chronic disease skewing A1C levels. On Levemir and Humalog insulin PTA. Unknown DM complications. Also h/o HTN, HLD, anemia, hypothyroidism, RA, fibromyalgia, remote cerebral aneurysm repair, acute hypercapnic respiratory failure now with tracheostomy, PEG tube, and bedbound (trach change 8/18, PEG change 8/14), sacral pressure ulcer, BIBEMS from home for 6 day hx of bleeding from the tracheostomy and PEG tube with associated abdominal pain> now resolved. Endocrinology consulted for hyperglycemia. Tolerating TFs with BG above goal due to rapid steroid taper for rheumatoid flare until 1/14. Will preventively decrease Lantus insulin since pt dose of Prednisone is coming down tomorrow and FBG was 130 at 6am. Will continue with other doses of insulin expecting BG values to improve as Prednisone dose comes down tomorrow. BG Goal 100-180mg/dl. No hypoglycemia.

## 2024-01-12 NOTE — PROGRESS NOTE ADULT - NS ATTEND AMEND GEN_ALL_CORE FT
73F with a h/o DM, HTN, HLD, anemia, hypothyroidism, RA, fibromyalgia, remote cerebral aneurysm with repair, hypercapnic respiratory failure s/p tracheostomy/PEG, bedbound, sacral pressure ulcer. Admitted w/ bleeding from tracheostomy and PEG w/ associated abdominal pain, recent hx of auditory/visual hallucinations. Imaging showed mild thickening along PEG tube tract and sacral ulcer extending to coccyx suggestive of acute osteomyelitis.    Remains on chronic mech vent, intermittently tolerating PS trials but unable to fully wean and unfortunately she will continue to be ventilator dependent  Tolerating PEG feeds  Sacral OM s/p 6 week course of Cipro and Keflex as per ID - completed 12/10/23. Appreciate wound care follow up.   Right shoulder pain likely from immobility (frozen shoulder) vs. inflammatory. c/w Neurontin and Oxycodone, Lidocaine patch and Voltaren. X-Ray with osteopenia, no fracture. UE dopplers negative. On Prednisone taper. Will consult Orthopedic surgery and pain management  Afebrile  >24 hours   Agree with plan as outlined above.  Discharge planning for early next week. d/w daughter Marysol over the phone

## 2024-01-12 NOTE — CONSULT NOTE ADULT - CONSULT REASON
spot on back
thrombocytopenia
Bleeding from Trach site
Right shoulder pain
wounds
Dental consulted to rule out odontogenic source of fever.
Gastrostomy leakage
Pneumonia
trach eval
hyperglycemia, hypothyroidism
trach change
Goals of care

## 2024-01-12 NOTE — PROGRESS NOTE ADULT - NSPROGADDITIONALINFOA_GEN_ALL_CORE
-Plan discussed with team.  Contact info: [phone] (24/7). pager [phone]  Amion on [place]  Teams on M-T-W-F. Unavailable Thu/Weekends/Holidays  Assessed pt/labs/meds and discussed plan of care with primary team  Adjusting insulin  Discharge plan  Follow up care -Plan discussed with team.  Contact info: [phone] (24/7). pager [phone]  Amion on [place]  Teams on M-T-W-F. Unavailable Thu/Weekends/Holidays  Assessed pt/labs/meds and discussed plan of care with primary team  Adjusting insulin  Discharge plan  Follow up care

## 2024-01-12 NOTE — CONSULT NOTE ADULT - SUBJECTIVE AND OBJECTIVE BOX
72 y/o F with PMHx of T2DM, HTN, HLD, anemia, hypothyroidism, RA, fibromyalgia, remote cerebral aneurysm repair, acute hypercapnic respiratory failure now with tracheostomy, PEG tube, bedbound, sacral pressure ulcer, BIBEMS from home for 6 day hx of bleeding from the tracheostomy and PEG tube with associated abdominal pain. She has had a prolonged hospital course. Patient was admitted on 10/28/23 for respiratory support, wound care of tracheostomy and PEG, and management of presumed sacral osteomyelitis. Per primary team she has been cleared for discharge since December.    Ortho consulted for shoulder and elbow pain. Daughter at bedside. Patient able to communicate (mouthing words). She has a remote history of R shoulder and elbow fracture. At baseline she has 2/10 elbow pain and 0/10 shoulder pain. Her RA flares are typically of her knees, hands and ankle. Has never had an RA flare in shoulder or elbow. She does not move much at baseline and is unable to care for self. Pain started aprox 1 week ago and has not improved despite pain medication. No know history of trauma.        PAST MEDICAL & SURGICAL HISTORY:  Diabetes      Rheumatoid arthritis      Fibromyalgia      Hypothyroid      Hypertension      H/O tracheostomy      PEG (percutaneous endoscopic gastrostomy) status        Home Medications:  acetaminophen 650 mg/20.3 mL oral liquid: 20.3 milliliter(s) by gastrostomy tube every 6 hours as needed for  fever (10 Nov 2023 19:52)  acetylcysteine 10% inhalation solution: 600 milligram(s) orally (10 Nov 2023 19:33)  calcium carbonate 400 mg/5 mL oral suspension: 500 milligram(s) by gastrostomy tube 2 times a day (10 Nov 2023 19:49)  diclofenac sodium 1% topical cream: Apply topically to affected area every 6 hours as needed for  mild pain apply to both arms and legs (10 Nov 2023 19:47)  DuoNeb 0.5 mg-2.5 mg/3 mL inhalation solution: 3 milliliter(s) by nebulizer every 6 hours as needed for  shortness of breath and/or wheezing (10 Nov 2023 19:31)  fentaNYL 25 mcg/hr transdermal film, extended release: 1 patch transdermal every 72 hours (11 Dec 2023 17:22)  ferrous sulfate (as elemental iron) 15 mg/1.5 mL oral liquid: 3 milliliter(s) by gastrostomy tube once a day (10 Nov 2023 19:56)  insulin lispro 100 units/mL injectable solution: injectable 4 times a day As per Instructions below:   1 Unit(s) if Glucose 151 - 200  2 Unit(s) if Glucose 201 - 250  3 Unit(s) if Glucose 251 - 300  4 Unit(s) if Glucose 301 - 350  5 Unit(s) if Glucose 351 - 400  6 Unit(s) if Glucose Greater Than 400 (11 Dec 2023 16:20)  insulin regular 100 units/mL human recombinant injectable solution: 24 international unit(s) injectable once a day Give at 6am (11 Dec 2023 15:59)  insulin regular 100 units/mL human recombinant injectable solution: 11 international unit(s) injectable 2 times a day Give at  12 Noon and 6 PM (11 Dec 2023 15:59)  Mucomyst-10 inhalation solution: 600 milligram(s) by nebulizer every 6 hours as needed for  congestion (10 Nov 2023 19:33)  oxyBUTYnin 5 mg oral tablet: 1 tab(s) by gastrostomy tube once a day (10 Nov 2023 19:42)  Prolia 60 mg/mL subcutaneous solution: subcutaneous every 6 months (08 Oct 2020 00:19)  Saline Mist 0.65% nasal spray: 2 spray(s) intranasally every 4 hours as needed for  congestion (10 Nov 2023 19:45)  sodium chloride 0.9% inhalation solution: 3 milliliter(s) inhaled every 6 hours as needed for  congestion (10 Nov 2023 19:41)  Systane ophthalmic solution: 2 drop(s) in each eye every 6 hours both eyes (10 Nov 2023 19:45)  Systane Ultra ophthalmic solution: 1 dose(s) to each affected eye 2 times a day (08 Oct 2020 00:19)  Vitamin C with Анна Hips 500 mg oral tablet, chewable: 1 tab(s) by gastrostomy tube once a day (10 Nov 2023 19:58)    Allergies    penicillin (Unknown)  heparin (Unknown)  Tagamet (Unknown)  pineapple (Unknown)  walnut (Unknown)  Pecan, Filbert, Hazelnut (Unknown)  Lyrica (Unknown)    Intolerances                    VITALS  Vital Signs Last 24 Hrs  T(C): 36.8 (12 Jan 2024 13:06), Max: 37.1 (11 Jan 2024 21:13)  T(F): 98.2 (12 Jan 2024 13:06), Max: 98.8 (11 Jan 2024 21:13)  HR: 84 (12 Jan 2024 13:06) (75 - 93)  BP: 113/54 (12 Jan 2024 13:06) (113/54 - 130/52)  BP(mean): --  RR: 18 (12 Jan 2024 13:06) (18 - 24)  SpO2: 100% (12 Jan 2024 13:06) (98% - 100%)    Parameters below as of 12 Jan 2024 13:06  Patient On (Oxygen Delivery Method): ventilator        PHYSICAL EXAM  General: NAD, Awake and Alert  Resp: trached  RUE:  Skin intact, no erythema, no warmth, no palpable effusion  Diffuse TTP about the shoulder and GH (more pronounced anteriorly), TTP bicipital groove  Minimal TTP elbow  Pain passive shoulder ROM :0-45 abd/forward flexion  Pain with passive elbow ROM: 15-90  No active ROM at elbow/shoulder/wrist w minimal  strength (baseline)  SILT  +Rad pulse    IMAGING:  < from: Xray Shoulder 2 Views, Bilateral (01.07.24 @ 15:32) >    ACC: [id number] EXAM:  XR FOREARM 2 VIEWS RT   ORDERED BY: COREY MONTES     ACC: [id number] EXAM:  XR SHOULDER COMP MIN 2V BI   ORDERED BY: COREY MONTES     PROCEDURE DATE:  01/07/2024          INTERPRETATION:  CLINICAL INDICATION: bilateral shoulder pain    EXAM:  Internal and external rotation AP and transscapular Y views of both   shoulders from 1/7/2024 at 1532. No similar prior studies available for   comparison.    IMPRESSION:  No acute fractures or dislocations in above imaged anatomic regions.    Flexion contracted appearing fingers. Preserved remaining visualized   joint spaces. Mild subarticular sclerotic reaction in proximal scaphoid    Generalized osteopenia otherwise no discrete lytic or blastic lesions.    --- End of Report ---            NITHYA MALDONADO MD; Attending Radiologist  This document has been electronically signed. Jan 8 2024  4:26PM    < end of copied text >         72 y/o F with PMHx of T2DM, HTN, HLD, anemia, hypothyroidism, RA, fibromyalgia, remote cerebral aneurysm repair, acute hypercapnic respiratory failure now with tracheostomy, PEG tube, bedbound, sacral pressure ulcer, BIBEMS from home for 6 day hx of bleeding from the tracheostomy and PEG tube with associated abdominal pain. She has had a prolonged hospital course. Patient was admitted on 10/28/23 for respiratory support, wound care of tracheostomy and PEG, and management of presumed sacral osteomyelitis. Per primary team she has been cleared for discharge since December.    Ortho consulted for shoulder and elbow pain. Daughter at bedside. Patient able to communicate (mouthing words). She has a remote history of R shoulder and elbow fracture. At baseline she has 2/10 elbow pain and 0/10 shoulder pain. Her RA flares are typically of her knees, hands and ankle. Has never had an RA flare in shoulder or elbow. She does not move much at baseline and is unable to care for self. Pain started aprox 1 week ago and has not improved despite pain medication. No know history of trauma.        PAST MEDICAL & SURGICAL HISTORY:  Diabetes      Rheumatoid arthritis      Fibromyalgia      Hypothyroid      Hypertension      H/O tracheostomy      PEG (percutaneous endoscopic gastrostomy) status        Home Medications:  acetaminophen 650 mg/20.3 mL oral liquid: 20.3 milliliter(s) by gastrostomy tube every 6 hours as needed for  fever (10 Nov 2023 19:52)  acetylcysteine 10% inhalation solution: 600 milligram(s) orally (10 Nov 2023 19:33)  calcium carbonate 400 mg/5 mL oral suspension: 500 milligram(s) by gastrostomy tube 2 times a day (10 Nov 2023 19:49)  diclofenac sodium 1% topical cream: Apply topically to affected area every 6 hours as needed for  mild pain apply to both arms and legs (10 Nov 2023 19:47)  DuoNeb 0.5 mg-2.5 mg/3 mL inhalation solution: 3 milliliter(s) by nebulizer every 6 hours as needed for  shortness of breath and/or wheezing (10 Nov 2023 19:31)  fentaNYL 25 mcg/hr transdermal film, extended release: 1 patch transdermal every 72 hours (11 Dec 2023 17:22)  ferrous sulfate (as elemental iron) 15 mg/1.5 mL oral liquid: 3 milliliter(s) by gastrostomy tube once a day (10 Nov 2023 19:56)  insulin lispro 100 units/mL injectable solution: injectable 4 times a day As per Instructions below:   1 Unit(s) if Glucose 151 - 200  2 Unit(s) if Glucose 201 - 250  3 Unit(s) if Glucose 251 - 300  4 Unit(s) if Glucose 301 - 350  5 Unit(s) if Glucose 351 - 400  6 Unit(s) if Glucose Greater Than 400 (11 Dec 2023 16:20)  insulin regular 100 units/mL human recombinant injectable solution: 24 international unit(s) injectable once a day Give at 6am (11 Dec 2023 15:59)  insulin regular 100 units/mL human recombinant injectable solution: 11 international unit(s) injectable 2 times a day Give at  12 Noon and 6 PM (11 Dec 2023 15:59)  Mucomyst-10 inhalation solution: 600 milligram(s) by nebulizer every 6 hours as needed for  congestion (10 Nov 2023 19:33)  oxyBUTYnin 5 mg oral tablet: 1 tab(s) by gastrostomy tube once a day (10 Nov 2023 19:42)  Prolia 60 mg/mL subcutaneous solution: subcutaneous every 6 months (08 Oct 2020 00:19)  Saline Mist 0.65% nasal spray: 2 spray(s) intranasally every 4 hours as needed for  congestion (10 Nov 2023 19:45)  sodium chloride 0.9% inhalation solution: 3 milliliter(s) inhaled every 6 hours as needed for  congestion (10 Nov 2023 19:41)  Systane ophthalmic solution: 2 drop(s) in each eye every 6 hours both eyes (10 Nov 2023 19:45)  Systane Ultra ophthalmic solution: 1 dose(s) to each affected eye 2 times a day (08 Oct 2020 00:19)  Vitamin C with Анна Hips 500 mg oral tablet, chewable: 1 tab(s) by gastrostomy tube once a day (10 Nov 2023 19:58)    Allergies    penicillin (Unknown)  heparin (Unknown)  Tagamet (Unknown)  pineapple (Unknown)  walnut (Unknown)  Pecan, Filbert, Hazelnut (Unknown)  Lyrica (Unknown)    Intolerances                    VITALS  Vital Signs Last 24 Hrs  T(C): 36.8 (12 Jan 2024 13:06), Max: 37.1 (11 Jan 2024 21:13)  T(F): 98.2 (12 Jan 2024 13:06), Max: 98.8 (11 Jan 2024 21:13)  HR: 84 (12 Jan 2024 13:06) (75 - 93)  BP: 113/54 (12 Jan 2024 13:06) (113/54 - 130/52)  BP(mean): --  RR: 18 (12 Jan 2024 13:06) (18 - 24)  SpO2: 100% (12 Jan 2024 13:06) (98% - 100%)    Parameters below as of 12 Jan 2024 13:06  Patient On (Oxygen Delivery Method): ventilator        PHYSICAL EXAM  General: NAD, Awake and Alert  Resp: trached  RUE:  Skin intact, no erythema, no warmth, no palpable effusion  Diffuse TTP about the shoulder and GH (more pronounced anteriorly), TTP bicipital groove  Minimal TTP elbow  Pain passive shoulder ROM :0-45 abd/forward flexion  Pain with passive elbow ROM: 15-90  No active ROM at elbow/shoulder/wrist w minimal  strength (baseline)  SILT  +Rad pulse    IMAGING:  < from: Xray Shoulder 2 Views, Bilateral (01.07.24 @ 15:32) >    ACC: [id number] EXAM:  XR FOREARM 2 VIEWS RT   ORDERED BY: COREY MONTES     ACC: [id number] EXAM:  XR SHOULDER COMP MIN 2V BI   ORDERED BY: COREY MONTES     PROCEDURE DATE:  01/07/2024          INTERPRETATION:  CLINICAL INDICATION: bilateral shoulder pain    EXAM:  Internal and external rotation AP and transscapular Y views of both   shoulders from 1/7/2024 at 1532. No similar prior studies available for   comparison.    IMPRESSION:  No acute fractures or dislocations in above imaged anatomic regions.    Flexion contracted appearing fingers. Preserved remaining visualized   joint spaces. Mild subarticular sclerotic reaction in proximal scaphoid    Generalized osteopenia otherwise no discrete lytic or blastic lesions.    --- End of Report ---            NITHYA MALDONADO MD; Attending Radiologist  This document has been electronically signed. Jan 8 2024  4:26PM    < end of copied text >         72 y/o F with PMHx of T2DM, HTN, HLD, anemia, hypothyroidism, RA, fibromyalgia, remote cerebral aneurysm repair, acute hypercapnic respiratory failure now with tracheostomy, PEG tube, bedbound, sacral pressure ulcer, BIBEMS from home for 6 day hx of bleeding from the tracheostomy and PEG tube with associated abdominal pain. She has had a prolonged hospital course. Patient was admitted on 10/28/23 for respiratory support, wound care of tracheostomy and PEG, and management of presumed sacral osteomyelitis. Per primary team she has been cleared for discharge since December.    Ortho consulted for shoulder and elbow pain. Daughter at bedside. Patient able to communicate (mouthing words). She has a remote history of R shoulder and elbow fracture. At baseline she has 2/10 elbow pain and 0/10 shoulder pain. Her RA flares are typically of her knees, hands and ankle. Has never had an RA flare in shoulder or elbow. She does not move much at baseline and is unable to care for self. Pain started aprox 1 week ago and has not improved despite pain medication. No know history of trauma.        PAST MEDICAL & SURGICAL HISTORY:  Diabetes      Rheumatoid arthritis      Fibromyalgia      Hypothyroid      Hypertension      H/O tracheostomy      PEG (percutaneous endoscopic gastrostomy) status        Home Medications:  acetaminophen 650 mg/20.3 mL oral liquid: 20.3 milliliter(s) by gastrostomy tube every 6 hours as needed for  fever (10 Nov 2023 19:52)  acetylcysteine 10% inhalation solution: 600 milligram(s) orally (10 Nov 2023 19:33)  calcium carbonate 400 mg/5 mL oral suspension: 500 milligram(s) by gastrostomy tube 2 times a day (10 Nov 2023 19:49)  diclofenac sodium 1% topical cream: Apply topically to affected area every 6 hours as needed for  mild pain apply to both arms and legs (10 Nov 2023 19:47)  DuoNeb 0.5 mg-2.5 mg/3 mL inhalation solution: 3 milliliter(s) by nebulizer every 6 hours as needed for  shortness of breath and/or wheezing (10 Nov 2023 19:31)  fentaNYL 25 mcg/hr transdermal film, extended release: 1 patch transdermal every 72 hours (11 Dec 2023 17:22)  ferrous sulfate (as elemental iron) 15 mg/1.5 mL oral liquid: 3 milliliter(s) by gastrostomy tube once a day (10 Nov 2023 19:56)  insulin lispro 100 units/mL injectable solution: injectable 4 times a day As per Instructions below:   1 Unit(s) if Glucose 151 - 200  2 Unit(s) if Glucose 201 - 250  3 Unit(s) if Glucose 251 - 300  4 Unit(s) if Glucose 301 - 350  5 Unit(s) if Glucose 351 - 400  6 Unit(s) if Glucose Greater Than 400 (11 Dec 2023 16:20)  insulin regular 100 units/mL human recombinant injectable solution: 24 international unit(s) injectable once a day Give at 6am (11 Dec 2023 15:59)  insulin regular 100 units/mL human recombinant injectable solution: 11 international unit(s) injectable 2 times a day Give at  12 Noon and 6 PM (11 Dec 2023 15:59)  Mucomyst-10 inhalation solution: 600 milligram(s) by nebulizer every 6 hours as needed for  congestion (10 Nov 2023 19:33)  oxyBUTYnin 5 mg oral tablet: 1 tab(s) by gastrostomy tube once a day (10 Nov 2023 19:42)  Prolia 60 mg/mL subcutaneous solution: subcutaneous every 6 months (08 Oct 2020 00:19)  Saline Mist 0.65% nasal spray: 2 spray(s) intranasally every 4 hours as needed for  congestion (10 Nov 2023 19:45)  sodium chloride 0.9% inhalation solution: 3 milliliter(s) inhaled every 6 hours as needed for  congestion (10 Nov 2023 19:41)  Systane ophthalmic solution: 2 drop(s) in each eye every 6 hours both eyes (10 Nov 2023 19:45)  Systane Ultra ophthalmic solution: 1 dose(s) to each affected eye 2 times a day (08 Oct 2020 00:19)  Vitamin C with Анна Hips 500 mg oral tablet, chewable: 1 tab(s) by gastrostomy tube once a day (10 Nov 2023 19:58)    Allergies    penicillin (Unknown)  heparin (Unknown)  Tagamet (Unknown)  pineapple (Unknown)  walnut (Unknown)  Pecan, Filbert, Hazelnut (Unknown)  Lyrica (Unknown)    Intolerances                    VITALS  Vital Signs Last 24 Hrs  T(C): 36.8 (12 Jan 2024 13:06), Max: 37.1 (11 Jan 2024 21:13)  T(F): 98.2 (12 Jan 2024 13:06), Max: 98.8 (11 Jan 2024 21:13)  HR: 84 (12 Jan 2024 13:06) (75 - 93)  BP: 113/54 (12 Jan 2024 13:06) (113/54 - 130/52)  BP(mean): --  RR: 18 (12 Jan 2024 13:06) (18 - 24)  SpO2: 100% (12 Jan 2024 13:06) (98% - 100%)    Parameters below as of 12 Jan 2024 13:06  Patient On (Oxygen Delivery Method): ventilator        PHYSICAL EXAM  General: NAD, Awake and Alert  Resp: trached  RUE:  Skin intact, no erythema, no warmth, no palpable effusion  Diffuse TTP about the shoulder and GH (more pronounced anteriorly), TTP bicipital groove  Minimal TTP elbow  Pain passive shoulder ROM :0-45 abd/forward flexion  Pain with passive elbow ROM: 15-90  No active ROM at elbow/shoulder/wrist w minimal  strength (baseline)  SILT  +Rad pulse    IMAGING:  < from: Xray Shoulder 2 Views, Bilateral (01.07.24 @ 15:32) >    ACC: [id number] EXAM:  XR FOREARM 2 VIEWS RT   ORDERED BY: COREY MONTES     ACC: [id number] EXAM:  XR SHOULDER COMP MIN 2V BI   ORDERED BY: COREY MONTES     PROCEDURE DATE:  01/07/2024          INTERPRETATION:  CLINICAL INDICATION: bilateral shoulder pain    EXAM:  Internal and external rotation AP and transscapular Y views of both   shoulders from 1/7/2024 at 1532. No similar prior studies available for   comparison.    IMPRESSION:  No acute fractures or dislocations in above imaged anatomic regions.    Flexion contracted appearing fingers. Preserved remaining visualized   joint spaces. Mild subarticular sclerotic reaction in proximal scaphoid    Generalized osteopenia otherwise no discrete lytic or blastic lesions.    --- End of Report ---            NITHYA MALDONADO MD; Attending Radiologist  This document has been electronically signed. Jan 8 2024  4:26PM    < end of copied text >

## 2024-01-12 NOTE — CONSULT NOTE ADULT - CONSULT REQUESTED BY NAME
G. Lisker MD
RCU
Dr Lisker
primary team
Primary Team
dr felipe laguerre
Gita Lisker
Primary team (RCU)
[facility]
MED TEAM
RCU
RCU

## 2024-01-12 NOTE — PROGRESS NOTE ADULT - SUBJECTIVE AND OBJECTIVE BOX
DIABETES FOLLOW UP NOTE: Saw pt earlier today    Chief Complaint: Endocrine consult requested for management of T2DM    INTERVAL HX: Pt stable, alert, at time of visit. Nods yes to still feeling pain> per HHA > pain in extremities. Per staff, pt tolerating TFs of [employer] glucose support 1.2 at 60cc/hr from 6am to 2am with BG levels above goal while on a rapid steroid taper. Noted BG up to 300s yesterday but down to 200s today. Pt on  Prednisone 30mg today>20mg tomorrow x 1 day then 10mg x1 day and then going back to 5mg daily. No hypoglycemia.         Review of Systems:  General: As above, able to nod to questions  Cardiovascular: No chest pain, palpitations  Respiratory: No SOB, no cough  GI: No nausea, vomiting, abdominal pain  Endocrine: No S&Sx of hypoglycemia    Allergies    penicillin (Unknown)  heparin (Unknown)  Tagamet (Unknown)  pineapple (Unknown)  walnut (Unknown)  Pecan, Filbert, Hazelnut (Unknown)  Lyrica (Unknown)    Intolerances      MEDICATIONS:  insulin glargine Injectable (LANTUS) 23 Unit(s) SubCutaneous <User Schedule>  insulin lispro (ADMELOG) corrective regimen sliding scale   SubCutaneous every 6 hours  insulin regular  human recombinant 11 Unit(s) SubCutaneous <User Schedule>  insulin regular  human recombinant 20 Unit(s) SubCutaneous <User Schedule>  insulin regular  human recombinant 42 Unit(s) SubCutaneous <User Schedule>  levothyroxine 125 MICROGram(s) Oral <User Schedule>    PHYSICAL EXAM:  VITALS: T(C): 36.8 (01-12-24 @ 13:06)  T(F): 98.2 (01-12-24 @ 13:06), Max: 98.8 (01-11-24 @ 21:13)  HR: 79 (01-12-24 @ 15:48) (75 - 93)  BP: 113/54 (01-12-24 @ 13:06) (113/54 - 130/52)  RR:  (18 - 24)  SpO2:  (98% - 100%)  Wt(kg): --  GENERAL: Female laying in bed in NAD. HHA at bedside  HEENT: Trach in place D&I  Abdomen: Soft, nontender, non distended. PEG in place with TFs going at 60cc/hr at time of visit  Extremities: Warm, mild edema in feet and hands   NEURO: Alert and able to nod yes/no to questions    LABS:  POCT Blood Glucose.: 239 mg/dL (01-12-24 @ 17:57)  POCT Blood Glucose.: 224 mg/dL (01-12-24 @ 11:45)  POCT Blood Glucose.: 130 mg/dL (01-12-24 @ 05:06)  POCT Blood Glucose.: 159 mg/dL (01-11-24 @ 23:54)  POCT Blood Glucose.: 248 mg/dL (01-11-24 @ 17:07)  POCT Blood Glucose.: 393 mg/dL (01-11-24 @ 11:34)  POCT Blood Glucose.: 368 mg/dL (01-11-24 @ 05:14)  POCT Blood Glucose.: 148 mg/dL (01-11-24 @ 00:02)  POCT Blood Glucose.: 138 mg/dL (01-10-24 @ 17:14)  POCT Blood Glucose.: 192 mg/dL (01-10-24 @ 11:32)  POCT Blood Glucose.: 207 mg/dL (01-10-24 @ 08:38)  POCT Blood Glucose.: 153 mg/dL (01-10-24 @ 05:41)  POCT Blood Glucose.: 139 mg/dL (01-09-24 @ 23:48)                            8.7    8.12  )-----------( 202      ( 12 Jan 2024 17:01 )             29.6       01-12    135  |  96  |  22  ----------------------------<  236<H>  4.4   |  28  |  <0.30<L>    eGFR: 113    Ca    10.3      01-12  Mg     2.0     01-12  Phos  3.7     01-12      Thyroid Function Tests:  01-09 @ 06:46 TSH 1.66 FreeT4 1.4 T3 -- Anti TPO -- Anti Thyroglobulin Ab -- TSI --      A1C with Estimated Average Glucose Result: 7.5 % (10-28-23 @ 07:18)      Estimated Average Glucose: 169 mg/dL (10-28-23 @ 07:18)        11-25 Chol -- Direct LDL -- LDL calculated -- HDL -- Trig 192<H>                 DIABETES FOLLOW UP NOTE: Saw pt earlier today    Chief Complaint: Endocrine consult requested for management of T2DM    INTERVAL HX: Pt stable, alert, at time of visit. Nods yes to still feeling pain> per HHA > pain in extremities. Per staff, pt tolerating TFs of [employer] glucose support 1.2 at 60cc/hr from 6am to 2am with BG levels above goal while on a rapid steroid taper. Noted BG up to 300s yesterday but down to 200s today. Pt on  Prednisone 30mg today>20mg tomorrow x 1 day then 10mg x1 day and then going back to 5mg daily. No hypoglycemia.         Review of Systems:  General: As above, able to nod to questions  Cardiovascular: No chest pain, palpitations  Respiratory: No SOB, no cough  GI: No nausea, vomiting, abdominal pain  Endocrine: No S&Sx of hypoglycemia    Allergies    penicillin (Unknown)  heparin (Unknown)  Tagamet (Unknown)  pineapple (Unknown)  walnut (Unknown)  Pecan, Filbert, Hazelnut (Unknown)  Lyrica (Unknown)    Intolerances      MEDICATIONS:  insulin glargine Injectable (LANTUS) 23 Unit(s) SubCutaneous <User Schedule>  insulin lispro (ADMELOG) corrective regimen sliding scale   SubCutaneous every 6 hours  insulin regular  human recombinant 11 Unit(s) SubCutaneous <User Schedule>  insulin regular  human recombinant 20 Unit(s) SubCutaneous <User Schedule>  insulin regular  human recombinant 42 Unit(s) SubCutaneous <User Schedule>  levothyroxine 125 MICROGram(s) Oral <User Schedule>    PHYSICAL EXAM:  VITALS: T(C): 36.8 (01-12-24 @ 13:06)  T(F): 98.2 (01-12-24 @ 13:06), Max: 98.8 (01-11-24 @ 21:13)  HR: 79 (01-12-24 @ 15:48) (75 - 93)  BP: 113/54 (01-12-24 @ 13:06) (113/54 - 130/52)  RR:  (18 - 24)  SpO2:  (98% - 100%)  Wt(kg): --  GENERAL: Female laying in bed in NAD. HHA at bedside  HEENT: Trach in place D&I  Abdomen: Soft, nontender, non distended. PEG in place with TFs going at 60cc/hr at time of visit  Extremities: Warm, mild edema in feet and hands   NEURO: Alert and able to nod yes/no to questions    LABS:  POCT Blood Glucose.: 239 mg/dL (01-12-24 @ 17:57)  POCT Blood Glucose.: 224 mg/dL (01-12-24 @ 11:45)  POCT Blood Glucose.: 130 mg/dL (01-12-24 @ 05:06)  POCT Blood Glucose.: 159 mg/dL (01-11-24 @ 23:54)  POCT Blood Glucose.: 248 mg/dL (01-11-24 @ 17:07)  POCT Blood Glucose.: 393 mg/dL (01-11-24 @ 11:34)  POCT Blood Glucose.: 368 mg/dL (01-11-24 @ 05:14)  POCT Blood Glucose.: 148 mg/dL (01-11-24 @ 00:02)  POCT Blood Glucose.: 138 mg/dL (01-10-24 @ 17:14)  POCT Blood Glucose.: 192 mg/dL (01-10-24 @ 11:32)  POCT Blood Glucose.: 207 mg/dL (01-10-24 @ 08:38)  POCT Blood Glucose.: 153 mg/dL (01-10-24 @ 05:41)  POCT Blood Glucose.: 139 mg/dL (01-09-24 @ 23:48)                            8.7    8.12  )-----------( 202      ( 12 Jan 2024 17:01 )             29.6       01-12    135  |  96  |  22  ----------------------------<  236<H>  4.4   |  28  |  <0.30<L>    eGFR: 113    Ca    10.3      01-12  Mg     2.0     01-12  Phos  3.7     01-12      Thyroid Function Tests:  01-09 @ 06:46 TSH 1.66 FreeT4 1.4 T3 -- Anti TPO -- Anti Thyroglobulin Ab -- TSI --      A1C with Estimated Average Glucose Result: 7.5 % (10-28-23 @ 07:18)      Estimated Average Glucose: 169 mg/dL (10-28-23 @ 07:18)        11-25 Chol -- Direct LDL -- LDL calculated -- HDL -- Trig 192<H>

## 2024-01-12 NOTE — CONSULT NOTE ADULT - ATTENDING COMMENTS
73 F PMH T2DM (A1c 7.5), HTN, HLD, anemia, hypothyroidism, RA, fibromyalgia, remote cerebral aneurysm repair, respiratory failure s/p Trach, s/p PEG, bedbound (trach change 8/18, PEG change 8/14), sacral wound, brought in for bleeding from trach and PEG site  Bleeding from trach  No fever, no leukocytosis  RVP neg  CT with PEG, sacral ulcer with osseous remodeling, patchy consolidative opacities  CXR with pneumonia  Sputum culture staph aureus, pseudomonas  Treat for pneumonia  Overall, PNA/tracheitis, bleeding from trach, sacral wound  - Cefepime 2g q 8  - Vanco 1g q 12 (monitor levels)  - Wound care to sacral wound  - Monitor for further signs infection  - Noninfectious causes bleeding trach per team  - F/U pending cultures  - If febrile, check BCXs    Thomas Ahn MD  Contact on TEAMS messaging from 9am - 5pm  From 5pm-9am, on weekends, or if no response call [phone]    I was physically present for the key portions of the evaluation and management service provided. I saw and examined the patient. I agree with the above history, physical, and plan except for any discrepancies which I have documented in “Attending Statements.” Please refer to “Attending Statements” for final plan.
Lesion on the back consistent with an irritated seborrheic keratoses. There are no malignant features on clinical exam. Outpatient treatment may be warranted as the lesion is symptomatic. May consider surgical consult for an excision if it becomes more symptomatic. Patient also has a separate dermatofibroma (deep scar) superior to the inflamed seborrheic keratosis. I do not recommend intervention for it. Dermatology to sign off. Please reconsult dermatology if there are any changes or new concerns.    Rashad Gongora MD, PharmD, MPH  Co-Director, Inpatient Dermatology Consultation Service, [facility]
Agree with Dr. Crook's assessment and plan as outlined above. Reviewed all pertinent labs, and imaging studies. Modifications made as indicated above. 73 F with history of T2D, HTN, HLD, hypothyroidism, RA, acute hypercapnic respiratory failure now with tracheostomy, PEG tube, and bedbound here for abdominal pain. Endocrinology consutled for hyperglycemia. A1C 7.5. Home DM meds: levemir 14 units daily and metformin 500 mg BID. Glucose in the 400s currently on lantus 25 units daily. Patient's current TF is from 6 am to 3 am 21 hours. Would increase lantus to 30 units and start regular insulin 13 units every 6 hours and titrate as needed based on glucose level. Rest of the plan as above. Patient is high risk with high level decision making due to uncontrolled diabetes with glucose>300 which places patient at high risk for cardiovascular and cerebrovascular events. Patient with lability of glucose requiring close monitoring and insulin adjustments.
74y Female with complex past medical history including RA, now with 1 week of  progressing Left shoulder pain. On exam she has exquisite TTP throughout her shoulder and glenohumeral joint and limited ROM 2/2 pain. Unclear etiology of pain at this point. Differential diagnosis include RA flare, OA flare, GH arthropathy, biceps tendonitis, septic arthritis (though less likely).  - Benign appearing shoulder however patient does have pain with movement and palpation. Recommend MRI of the right shoulder to assess for any pathology and effusion. If there is an effusion will discuss with family an aspiration.  I agree with the above note and have personally seen and examined this patient. All pertinent films have been reviewed. Please refer to clinical documentation of the history, physical examinations, data summary, and both assessment and plan as documented above and with which I agree.    Darrell Cortez MD  Attending Orthopedic Surgeon

## 2024-01-12 NOTE — PROGRESS NOTE ADULT - NUTRITIONAL ASSESSMENT
Diet, NPO with Tube Feed:   Tube Feeding Modality: Gastrostomy  Casie Frazier glucose support 1.2  Total Volume for 24 Hours (mL): 1200  Continuous  Starting Tube Feed Rate {mL per Hour}: 60  Increase Tube Feed Rate by (mL): 0  Until Goal Tube Feed Rate (mL per Hour): 60  Tube Feed Duration (in Hours): 20  Tube Feed Start Time: 06:00  Tube Feed Stop Time: 02:00  Free Water Flush  Pump   Rate (mL per Hour): 20   Frequency: Every 2 Hours  Alfred(7 Gm Arginine/7 Gm Glut/1.2 Gm HMB     Qty per Day:  2 (01-10-24 @ 17:19) [Active]          Please see RD assessment and/or follow up.  Managed by primary team as well

## 2024-01-13 LAB
ERYTHROCYTE [SEDIMENTATION RATE] IN BLOOD: 84 MM/HR — HIGH (ref 0–20)
ERYTHROCYTE [SEDIMENTATION RATE] IN BLOOD: 84 MM/HR — HIGH (ref 0–20)
GLUCOSE BLDC GLUCOMTR-MCNC: 103 MG/DL — HIGH (ref 70–99)
GLUCOSE BLDC GLUCOMTR-MCNC: 103 MG/DL — HIGH (ref 70–99)
GLUCOSE BLDC GLUCOMTR-MCNC: 134 MG/DL — HIGH (ref 70–99)
GLUCOSE BLDC GLUCOMTR-MCNC: 134 MG/DL — HIGH (ref 70–99)
GLUCOSE BLDC GLUCOMTR-MCNC: 155 MG/DL — HIGH (ref 70–99)
GLUCOSE BLDC GLUCOMTR-MCNC: 155 MG/DL — HIGH (ref 70–99)
GLUCOSE BLDC GLUCOMTR-MCNC: 225 MG/DL — HIGH (ref 70–99)
GLUCOSE BLDC GLUCOMTR-MCNC: 225 MG/DL — HIGH (ref 70–99)

## 2024-01-13 PROCEDURE — 99233 SBSQ HOSP IP/OBS HIGH 50: CPT | Mod: FS

## 2024-01-13 PROCEDURE — 99221 1ST HOSP IP/OBS SF/LOW 40: CPT

## 2024-01-13 RX ORDER — ACETAMINOPHEN 500 MG
1000 TABLET ORAL ONCE
Refills: 0 | Status: COMPLETED | OUTPATIENT
Start: 2024-01-13 | End: 2024-01-13

## 2024-01-13 RX ORDER — INSULIN GLARGINE 100 [IU]/ML
19 INJECTION, SOLUTION SUBCUTANEOUS
Refills: 0 | Status: DISCONTINUED | OUTPATIENT
Start: 2024-01-14 | End: 2024-01-17

## 2024-01-13 RX ORDER — INSULIN HUMAN 100 [IU]/ML
18 INJECTION, SOLUTION SUBCUTANEOUS
Refills: 0 | Status: DISCONTINUED | OUTPATIENT
Start: 2024-01-14 | End: 2024-01-15

## 2024-01-13 RX ORDER — INSULIN HUMAN 100 [IU]/ML
38 INJECTION, SOLUTION SUBCUTANEOUS
Refills: 0 | Status: DISCONTINUED | OUTPATIENT
Start: 2024-01-14 | End: 2024-01-15

## 2024-01-13 RX ORDER — SALIVA SUBSTITUTE COMB NO.11 351 MG
5 POWDER IN PACKET (EA) MUCOUS MEMBRANE EVERY 6 HOURS
Refills: 0 | Status: DISCONTINUED | OUTPATIENT
Start: 2024-01-13 | End: 2024-01-17

## 2024-01-13 RX ORDER — INSULIN HUMAN 100 [IU]/ML
9 INJECTION, SOLUTION SUBCUTANEOUS
Refills: 0 | Status: DISCONTINUED | OUTPATIENT
Start: 2024-01-13 | End: 2024-01-17

## 2024-01-13 RX ADMIN — Medication 20 MILLIGRAM(S): at 06:30

## 2024-01-13 RX ADMIN — Medication 5 MILLIGRAM(S): at 21:40

## 2024-01-13 RX ADMIN — Medication 1 APPLICATION(S): at 17:43

## 2024-01-13 RX ADMIN — AMLODIPINE BESYLATE 10 MILLIGRAM(S): 2.5 TABLET ORAL at 06:27

## 2024-01-13 RX ADMIN — LIDOCAINE 1 PATCH: 4 CREAM TOPICAL at 23:45

## 2024-01-13 RX ADMIN — OXYCODONE HYDROCHLORIDE 5 MILLIGRAM(S): 5 TABLET ORAL at 22:14

## 2024-01-13 RX ADMIN — PHENYLEPHRINE-SHARK LIVER OIL-MINERAL OIL-PETROLATUM RECTAL OINTMENT 1 APPLICATION(S): at 11:48

## 2024-01-13 RX ADMIN — Medication 2: at 00:05

## 2024-01-13 RX ADMIN — Medication 1 APPLICATION(S): at 00:06

## 2024-01-13 RX ADMIN — GABAPENTIN 250 MILLIGRAM(S): 400 CAPSULE ORAL at 21:39

## 2024-01-13 RX ADMIN — Medication 3 MILLILITER(S): at 14:51

## 2024-01-13 RX ADMIN — Medication 1 APPLICATION(S): at 11:47

## 2024-01-13 RX ADMIN — Medication 15 MILLILITER(S): at 11:48

## 2024-01-13 RX ADMIN — Medication 1000 MILLIGRAM(S): at 11:46

## 2024-01-13 RX ADMIN — LIDOCAINE 1 PATCH: 4 CREAM TOPICAL at 11:46

## 2024-01-13 RX ADMIN — INSULIN HUMAN 20 UNIT(S): 100 INJECTION, SOLUTION SUBCUTANEOUS at 11:49

## 2024-01-13 RX ADMIN — Medication 3 MILLILITER(S): at 21:24

## 2024-01-13 RX ADMIN — Medication 125 MICROGRAM(S): at 06:28

## 2024-01-13 RX ADMIN — NYSTATIN CREAM 1 APPLICATION(S): 100000 CREAM TOPICAL at 14:03

## 2024-01-13 RX ADMIN — Medication 2 DROP(S): at 00:06

## 2024-01-13 RX ADMIN — LIDOCAINE 1 PATCH: 4 CREAM TOPICAL at 18:16

## 2024-01-13 RX ADMIN — LIDOCAINE 1 PATCH: 4 CREAM TOPICAL at 11:45

## 2024-01-13 RX ADMIN — LIDOCAINE 1 PATCH: 4 CREAM TOPICAL at 01:54

## 2024-01-13 RX ADMIN — Medication 3 MILLILITER(S): at 05:35

## 2024-01-13 RX ADMIN — OXYCODONE HYDROCHLORIDE 5 MILLIGRAM(S): 5 TABLET ORAL at 09:38

## 2024-01-13 RX ADMIN — LIDOCAINE 5 MILLILITER(S): 4 CREAM TOPICAL at 17:41

## 2024-01-13 RX ADMIN — SIMETHICONE 80 MILLIGRAM(S): 80 TABLET, CHEWABLE ORAL at 11:47

## 2024-01-13 RX ADMIN — ZINC OXIDE 1 APPLICATION(S): 200 OINTMENT TOPICAL at 11:49

## 2024-01-13 RX ADMIN — Medication 1 APPLICATION(S): at 06:26

## 2024-01-13 RX ADMIN — Medication 1 TABLET(S): at 06:28

## 2024-01-13 RX ADMIN — LIDOCAINE 1 PATCH: 4 CREAM TOPICAL at 01:53

## 2024-01-13 RX ADMIN — Medication 2 DROP(S): at 17:43

## 2024-01-13 RX ADMIN — OXYCODONE HYDROCHLORIDE 5 MILLIGRAM(S): 5 TABLET ORAL at 21:40

## 2024-01-13 RX ADMIN — SIMETHICONE 80 MILLIGRAM(S): 80 TABLET, CHEWABLE ORAL at 17:40

## 2024-01-13 RX ADMIN — Medication 2 DROP(S): at 06:26

## 2024-01-13 RX ADMIN — SIMETHICONE 80 MILLIGRAM(S): 80 TABLET, CHEWABLE ORAL at 00:05

## 2024-01-13 RX ADMIN — Medication 2 DROP(S): at 11:47

## 2024-01-13 RX ADMIN — Medication 5 MILLILITER(S): at 17:40

## 2024-01-13 RX ADMIN — Medication 1 MILLIGRAM(S): at 21:40

## 2024-01-13 RX ADMIN — GABAPENTIN 100 MILLIGRAM(S): 400 CAPSULE ORAL at 06:26

## 2024-01-13 RX ADMIN — ESCITALOPRAM OXALATE 10 MILLIGRAM(S): 10 TABLET, FILM COATED ORAL at 08:29

## 2024-01-13 RX ADMIN — INSULIN HUMAN 42 UNIT(S): 100 INJECTION, SOLUTION SUBCUTANEOUS at 06:25

## 2024-01-13 RX ADMIN — INSULIN GLARGINE 21 UNIT(S): 100 INJECTION, SOLUTION SUBCUTANEOUS at 08:28

## 2024-01-13 RX ADMIN — OXYCODONE HYDROCHLORIDE 5 MILLIGRAM(S): 5 TABLET ORAL at 01:30

## 2024-01-13 RX ADMIN — NYSTATIN CREAM 1 APPLICATION(S): 100000 CREAM TOPICAL at 06:25

## 2024-01-13 RX ADMIN — LIDOCAINE 5 MILLILITER(S): 4 CREAM TOPICAL at 06:27

## 2024-01-13 RX ADMIN — Medication 500 MILLIGRAM(S): at 11:47

## 2024-01-13 RX ADMIN — QUETIAPINE FUMARATE 12.5 MILLIGRAM(S): 200 TABLET, FILM COATED ORAL at 17:41

## 2024-01-13 RX ADMIN — Medication 1000 MILLIGRAM(S): at 12:46

## 2024-01-13 RX ADMIN — Medication 1 TABLET(S): at 17:41

## 2024-01-13 RX ADMIN — CHLORHEXIDINE GLUCONATE 15 MILLILITER(S): 213 SOLUTION TOPICAL at 17:40

## 2024-01-13 RX ADMIN — OXYCODONE HYDROCHLORIDE 5 MILLIGRAM(S): 5 TABLET ORAL at 08:38

## 2024-01-13 RX ADMIN — Medication 1000 MILLIGRAM(S): at 19:20

## 2024-01-13 RX ADMIN — OXYCODONE HYDROCHLORIDE 5 MILLIGRAM(S): 5 TABLET ORAL at 01:10

## 2024-01-13 RX ADMIN — INSULIN HUMAN 9 UNIT(S): 100 INJECTION, SOLUTION SUBCUTANEOUS at 17:41

## 2024-01-13 RX ADMIN — Medication 4: at 17:42

## 2024-01-13 RX ADMIN — DICLOFENAC SODIUM 2 GRAM(S): 30 GEL TOPICAL at 11:48

## 2024-01-13 RX ADMIN — CHLORHEXIDINE GLUCONATE 15 MILLILITER(S): 213 SOLUTION TOPICAL at 06:26

## 2024-01-13 RX ADMIN — SIMETHICONE 80 MILLIGRAM(S): 80 TABLET, CHEWABLE ORAL at 06:28

## 2024-01-13 RX ADMIN — CHLORHEXIDINE GLUCONATE 1 APPLICATION(S): 213 SOLUTION TOPICAL at 06:26

## 2024-01-13 RX ADMIN — PANTOPRAZOLE SODIUM 40 MILLIGRAM(S): 20 TABLET, DELAYED RELEASE ORAL at 08:28

## 2024-01-13 RX ADMIN — GABAPENTIN 100 MILLIGRAM(S): 400 CAPSULE ORAL at 14:03

## 2024-01-13 RX ADMIN — Medication 400 MILLIGRAM(S): at 18:47

## 2024-01-13 NOTE — PROGRESS NOTE ADULT - SUBJECTIVE AND OBJECTIVE BOX
Patient is a 74y old  Female who presents with a chief complaint of Bleeding from PEG (10 Noe 2024 15:43)      Interval Events: No events reported over night.     REVIEW OF SYSTEMS:  [ ] Positive  [ ] All other systems negative  [X] Unable to assess ROS because ___No events reported over night.     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  [ ]Ambulatory/OOB to chair                           EXTREMITIES                                         [X]Normal  [ ]Edema                           NEUROLOGIC  [ ] Normal, non focal  [X] Focal findings: opens eyes spontaneously, follows commands on all 4 extremities    PSYCHIATRIC  [X]Alert and appropriate  [ ] Sedated	 [ ]Agitated    :  Mcbride: [ ] Yes, if yes: Date of Placement:                   [X] No    LINES: Central Lines [ ] Yes, if yes: Date of Placement                                     [X] No        HOSPITAL MEDICATIONS:  MEDICATIONS  (STANDING):  albuterol/ipratropium for Nebulization 3 milliLiter(s) Nebulizer every 8 hours  amLODIPine   Tablet 10 milliGRAM(s) Oral daily  artificial  tears Solution 2 Drop(s) Both EYES four times a day  ascorbic acid 500 milliGRAM(s) Oral daily  calcium carbonate    500 mG (Tums) Chewable 1 Tablet(s) Chew every 12 hours  chlorhexidine 0.12% Liquid 15 milliLiter(s) Oral Mucosa every 12 hours  chlorhexidine 4% Liquid 1 Application(s) Topical <User Schedule>  dextrose 50% Injectable 25 Gram(s) IV Push once  dextrose 50% Injectable 12.5 Gram(s) IV Push once  escitalopram 10 milliGRAM(s) Oral <User Schedule>  gabapentin Solution 100 milliGRAM(s) Oral <User Schedule>  gabapentin Solution 250 milliGRAM(s) Enteral Tube at bedtime  glucagon  Injectable 1 milliGRAM(s) IntraMuscular once  hemorrhoidal Ointment      hemorrhoidal Ointment 1 Application(s) Rectal daily  insulin glargine Injectable (LANTUS) 23 Unit(s) SubCutaneous <User Schedule>  insulin lispro (ADMELOG) corrective regimen sliding scale   SubCutaneous every 6 hours  insulin regular  human recombinant 11 Unit(s) SubCutaneous <User Schedule>  insulin regular  human recombinant 20 Unit(s) SubCutaneous <User Schedule>  insulin regular  human recombinant 42 Unit(s) SubCutaneous <User Schedule>  levothyroxine 125 MICROGram(s) Oral <User Schedule>  lidocaine   4% Patch 1 Patch Transdermal daily  lidocaine   4% Patch 1 Patch Transdermal daily  lidocaine 2% Viscous 5 milliLiter(s) Mucosal two times a day  melatonin 5 milliGRAM(s) Oral at bedtime  melatonin 1 milliGRAM(s) Oral at bedtime  multivitamin/minerals/iron Oral Solution (CENTRUM) 15 milliLiter(s) Oral daily  nystatin Powder 1 Application(s) Topical every 8 hours  pantoprazole   Suspension 40 milliGRAM(s) Enteral Tube <User Schedule>  petrolatum Ophthalmic Ointment 1 Application(s) Both EYES four times a day  QUEtiapine 12.5 milliGRAM(s) Oral <User Schedule>  silver nitrate Applicator 1 Application(s) Topical once  simethicone 80 milliGRAM(s) Chew four times a day  zinc oxide 40% Paste 1 Application(s) Topical daily    MEDICATIONS  (PRN):  acetaminophen   Oral Liquid .. 1000 milliGRAM(s) Oral every 6 hours PRN Temp greater or equal to 38.5C (101.3F)  benzocaine 20% Spray 1 Spray(s) Topical four times a day PRN Cough  diclofenac sodium 1% Gel 2 Gram(s) Topical four times a day PRN pain  diphenhydrAMINE Elixir 25 milliGRAM(s) Oral every 6 hours PRN Rash and/or Itching  guaifenesin/dextromethorphan Oral Liquid 10 milliLiter(s) Oral every 6 hours PRN Cough  lidocaine   4% Patch 2 Patch Transdermal daily PRN shoulder pain  oxyCODONE    Solution 5 milliGRAM(s) Oral every 6 hours PRN Moderate Pain (4 - 6)  sodium chloride 0.65% Nasal 1 Spray(s) Both Nostrils every 6 hours PRN Nasal Congestion      LABS:                  CAPILLARY BLOOD GLUCOSE    MICROBIOLOGY:     RADIOLOGY:  [ ] Reviewed and interpreted by me    Mode: AC/ CMV (Assist Control/ Continuous Mandatory Ventilation)  RR (machine): 12  TV (machine): 300  FiO2: 30  PEEP: 5  ITime: 1  MAP: 11  PIP: 28

## 2024-01-13 NOTE — PROGRESS NOTE ADULT - ASSESSMENT
72 y/o F with PMHx of T2DM, HTN, HLD, anemia, hypothyroidism, RA, fibromyalgia, remote cerebral aneurysm repair, acute hypercapnic respiratory failure now with tracheostomy, PEG tube, and bedbound (trach change 8/18, PEG change 8/14), sacral pressure ulcer, BIBEMS from home for 6 day hx of bleeding from the tracheostomy and PEG tube with associated abdominal pain, recent history of auditory and visual hallucinations, and with chronic sacral decubitus ulcer. Exam notable for mild abdominal distention with LLQ and RLQ tenderness, tracheostomy in place with no obvious bleeding, PEG tube with scant amount of dried blood, at baseline neurologic status. Imaging showing no active bleeding around tracheostomy site, however was notable for mild thickening along PEG tube tract, sacral ulcer extending to the coccyx suggestive of possible osteomyelitis, and bibasilar patchy lung opacities with volume loss. Patient admitted for respiratory support, wound care of tracheostomy and PEG, and management of sacral osteomyelitis. No further Trach and peg site bleeding noted since admission.  Pelvic MRI imaging also suggestive of Acute OM. Patient placed on long-term antibiotics with Infectious disease guidance.  Additionally treated with a 7d course of Cefepime due to PSA and Serratia tracheitis on 11/8-->11/15 and then resumed cipro/keflex.  Hospital course c/b Delirium for which Seroquel was added and home Lexapro continued. Tracheostomy changed to #9 Cuffed [place] by ENT 11/3.  Despite trach change patient remained with persistent air leak.  On 11/19, the trach was again changed due to leak and loss of volumes on vent.  Trach was changed to #8 distal cuffed [place].  Patient seen by Dermatology for back lesions.  One diagnosed as a seborrheic keratitis and the other a dermatofibroma.  Intermittent abdominal pain throughout hospital course, at times relieved with gas/BM, w/u negative, seen by GI - no recs.  12/10 pt completed cipro and keflex for osteo treatment.  12/13 moderate amounts of secretions w/ blood - tranexamic acid neb given with notable improvement.  12/18 with blood tinged secretion again - TXA 250mg neb x2 doses.  12/19 spiked fever to 102 - pancultured, negative w/u.  12/19 silver Nitrate applied to PEG site by GI for leakage.  Pt has since remained medically stable.  1/7-1/8 Generalized pain, starting in forearm and BL shoulders in which an xray was performed and was negative for acute fractures/dislocation, doppler negative for DVT.  Will continue with pain management as needed.  Receives continuous pulmonary toileting w/ duoneb, chest PT, and suctioning PRN.      1/12: No acute events over night.  Orthopedic and pain management consult requested to evaluate persistent atraumatic right shoulder pain. 74 y/o F with PMHx of T2DM, HTN, HLD, anemia, hypothyroidism, RA, fibromyalgia, remote cerebral aneurysm repair, acute hypercapnic respiratory failure now with tracheostomy, PEG tube, and bedbound (trach change 8/18, PEG change 8/14), sacral pressure ulcer, BIBEMS from home for 6 day hx of bleeding from the tracheostomy and PEG tube with associated abdominal pain, recent history of auditory and visual hallucinations, and with chronic sacral decubitus ulcer. Exam notable for mild abdominal distention with LLQ and RLQ tenderness, tracheostomy in place with no obvious bleeding, PEG tube with scant amount of dried blood, at baseline neurologic status. Imaging showing no active bleeding around tracheostomy site, however was notable for mild thickening along PEG tube tract, sacral ulcer extending to the coccyx suggestive of possible osteomyelitis, and bibasilar patchy lung opacities with volume loss. Patient admitted for respiratory support, wound care of tracheostomy and PEG, and management of sacral osteomyelitis. No further Trach and peg site bleeding noted since admission.  Pelvic MRI imaging also suggestive of Acute OM. Patient placed on long-term antibiotics with Infectious disease guidance.  Additionally treated with a 7d course of Cefepime due to PSA and Serratia tracheitis on 11/8-->11/15 and then resumed cipro/keflex.  Hospital course c/b Delirium for which Seroquel was added and home Lexapro continued. Tracheostomy changed to #9 Cuffed [place] by ENT 11/3.  Despite trach change patient remained with persistent air leak.  On 11/19, the trach was again changed due to leak and loss of volumes on vent.  Trach was changed to #8 distal cuffed [place].  Patient seen by Dermatology for back lesions.  One diagnosed as a seborrheic keratitis and the other a dermatofibroma.  Intermittent abdominal pain throughout hospital course, at times relieved with gas/BM, w/u negative, seen by GI - no recs.  12/10 pt completed cipro and keflex for osteo treatment.  12/13 moderate amounts of secretions w/ blood - tranexamic acid neb given with notable improvement.  12/18 with blood tinged secretion again - TXA 250mg neb x2 doses.  12/19 spiked fever to 102 - pancultured, negative w/u.  12/19 silver Nitrate applied to PEG site by GI for leakage.  Pt has since remained medically stable.  1/7-1/8 Generalized pain, starting in forearm and BL shoulders in which an xray was performed and was negative for acute fractures/dislocation, doppler negative for DVT.  Will continue with pain management as needed.  Receives continuous pulmonary toileting w/ duoneb, chest PT, and suctioning PRN.      1/12: No acute events over night.  Orthopedic and pain management consult requested to evaluate persistent atraumatic right shoulder pain.  1/13 MRI of shoulder planned for 1/14  2 pm.  72 y/o F with PMHx of T2DM, HTN, HLD, anemia, hypothyroidism, RA, fibromyalgia, remote cerebral aneurysm repair, acute hypercapnic respiratory failure now with tracheostomy, PEG tube, and bedbound (trach change 8/18, PEG change 8/14), sacral pressure ulcer, BIBEMS from home for 6 day hx of bleeding from the tracheostomy and PEG tube with associated abdominal pain, recent history of auditory and visual hallucinations, and with chronic sacral decubitus ulcer. Exam notable for mild abdominal distention with LLQ and RLQ tenderness, tracheostomy in place with no obvious bleeding, PEG tube with scant amount of dried blood, at baseline neurologic status. Imaging showing no active bleeding around tracheostomy site, however was notable for mild thickening along PEG tube tract, sacral ulcer extending to the coccyx suggestive of possible osteomyelitis, and bibasilar patchy lung opacities with volume loss. Patient admitted for respiratory support, wound care of tracheostomy and PEG, and management of sacral osteomyelitis. No further Trach and peg site bleeding noted since admission.  Pelvic MRI imaging also suggestive of Acute OM. Patient placed on long-term antibiotics with Infectious disease guidance.  Additionally treated with a 7d course of Cefepime due to PSA and Serratia tracheitis on 11/8-->11/15 and then resumed cipro/keflex.  Hospital course c/b Delirium for which Seroquel was added and home Lexapro continued. Tracheostomy changed to #9 Cuffed [place] by ENT 11/3.  Despite trach change patient remained with persistent air leak.  On 11/19, the trach was again changed due to leak and loss of volumes on vent.  Trach was changed to #8 distal cuffed [place].  Patient seen by Dermatology for back lesions.  One diagnosed as a seborrheic keratitis and the other a dermatofibroma.  Intermittent abdominal pain throughout hospital course, at times relieved with gas/BM, w/u negative, seen by GI - no recs.  12/10 pt completed cipro and keflex for osteo treatment.  12/13 moderate amounts of secretions w/ blood - tranexamic acid neb given with notable improvement.  12/18 with blood tinged secretion again - TXA 250mg neb x2 doses.  12/19 spiked fever to 102 - pancultured, negative w/u.  12/19 silver Nitrate applied to PEG site by GI for leakage.  Pt has since remained medically stable.  1/7-1/8 Generalized pain, starting in forearm and BL shoulders in which an xray was performed and was negative for acute fractures/dislocation, doppler negative for DVT.  Will continue with pain management as needed.  Receives continuous pulmonary toileting w/ duoneb, chest PT, and suctioning PRN.      1/12: No acute events over night.  Orthopedic and pain management consult requested to evaluate persistent atraumatic right shoulder pain.  1/13 MRI of shoulder planned for 1/14  2 pm.  72 y/o F with PMHx of T2DM, HTN, HLD, anemia, hypothyroidism, RA, fibromyalgia, remote cerebral aneurysm repair, acute hypercapnic respiratory failure now with tracheostomy, PEG tube, and bedbound (trach change 8/18, PEG change 8/14), sacral pressure ulcer, BIBEMS from home for 6 day hx of bleeding from the tracheostomy and PEG tube with associated abdominal pain, recent history of auditory and visual hallucinations, and with chronic sacral decubitus ulcer. Exam notable for mild abdominal distention with LLQ and RLQ tenderness, tracheostomy in place with no obvious bleeding, PEG tube with scant amount of dried blood, at baseline neurologic status. Imaging showing no active bleeding around tracheostomy site, however was notable for mild thickening along PEG tube tract, sacral ulcer extending to the coccyx suggestive of possible osteomyelitis, and bibasilar patchy lung opacities with volume loss. Patient admitted for respiratory support, wound care of tracheostomy and PEG, and management of sacral osteomyelitis. No further Trach and peg site bleeding noted since admission.  Pelvic MRI imaging also suggestive of Acute OM. Patient placed on long-term antibiotics with Infectious disease guidance.  Additionally treated with a 7d course of Cefepime due to PSA and Serratia tracheitis on 11/8-->11/15 and then resumed cipro/keflex.  Hospital course c/b Delirium for which Seroquel was added and home Lexapro continued. Tracheostomy changed to #9 Cuffed [place] by ENT 11/3.  Despite trach change patient remained with persistent air leak.  On 11/19, the trach was again changed due to leak and loss of volumes on vent.  Trach was changed to #8 distal cuffed [place].  Patient seen by Dermatology for back lesions.  One diagnosed as a seborrheic keratitis and the other a dermatofibroma.  Intermittent abdominal pain throughout hospital course, at times relieved with gas/BM, w/u negative, seen by GI - no recs.  12/10 pt completed cipro and keflex for osteo treatment.  12/13 moderate amounts of secretions w/ blood - tranexamic acid neb given with notable improvement.  12/18 with blood tinged secretion again - TXA 250mg neb x2 doses.  12/19 spiked fever to 102 - pancultured, negative w/u.  12/19 silver Nitrate applied to PEG site by GI for leakage.  Pt has since remained medically stable.  1/7-1/8 Generalized pain, starting in forearm and BL shoulders in which an xray was performed and was negative for acute fractures/dislocation, doppler negative for DVT.  Will continue with pain management as needed.  Receives continuous pulmonary toileting w/ duoneb, chest PT, and suctioning PRN.      1/12: No acute events over night.  Orthopedic and pain management consult requested to evaluate persistent atraumatic right shoulder pain.  1/13 MRI of right  shoulder planned for 1/14  2 pm. CRP 99- multitude diagnosis to be considered per orthopedic evaluation .  74 y/o F with PMHx of T2DM, HTN, HLD, anemia, hypothyroidism, RA, fibromyalgia, remote cerebral aneurysm repair, acute hypercapnic respiratory failure now with tracheostomy, PEG tube, and bedbound (trach change 8/18, PEG change 8/14), sacral pressure ulcer, BIBEMS from home for 6 day hx of bleeding from the tracheostomy and PEG tube with associated abdominal pain, recent history of auditory and visual hallucinations, and with chronic sacral decubitus ulcer. Exam notable for mild abdominal distention with LLQ and RLQ tenderness, tracheostomy in place with no obvious bleeding, PEG tube with scant amount of dried blood, at baseline neurologic status. Imaging showing no active bleeding around tracheostomy site, however was notable for mild thickening along PEG tube tract, sacral ulcer extending to the coccyx suggestive of possible osteomyelitis, and bibasilar patchy lung opacities with volume loss. Patient admitted for respiratory support, wound care of tracheostomy and PEG, and management of sacral osteomyelitis. No further Trach and peg site bleeding noted since admission.  Pelvic MRI imaging also suggestive of Acute OM. Patient placed on long-term antibiotics with Infectious disease guidance.  Additionally treated with a 7d course of Cefepime due to PSA and Serratia tracheitis on 11/8-->11/15 and then resumed cipro/keflex.  Hospital course c/b Delirium for which Seroquel was added and home Lexapro continued. Tracheostomy changed to #9 Cuffed [place] by ENT 11/3.  Despite trach change patient remained with persistent air leak.  On 11/19, the trach was again changed due to leak and loss of volumes on vent.  Trach was changed to #8 distal cuffed [place].  Patient seen by Dermatology for back lesions.  One diagnosed as a seborrheic keratitis and the other a dermatofibroma.  Intermittent abdominal pain throughout hospital course, at times relieved with gas/BM, w/u negative, seen by GI - no recs.  12/10 pt completed cipro and keflex for osteo treatment.  12/13 moderate amounts of secretions w/ blood - tranexamic acid neb given with notable improvement.  12/18 with blood tinged secretion again - TXA 250mg neb x2 doses.  12/19 spiked fever to 102 - pancultured, negative w/u.  12/19 silver Nitrate applied to PEG site by GI for leakage.  Pt has since remained medically stable.  1/7-1/8 Generalized pain, starting in forearm and BL shoulders in which an xray was performed and was negative for acute fractures/dislocation, doppler negative for DVT.  Will continue with pain management as needed.  Receives continuous pulmonary toileting w/ duoneb, chest PT, and suctioning PRN.      1/12: No acute events over night.  Orthopedic and pain management consult requested to evaluate persistent atraumatic right shoulder pain.  1/13 MRI of right  shoulder planned for 1/14  2 pm. CRP 99- multitude diagnosis to be considered per orthopedic evaluation .

## 2024-01-13 NOTE — PROGRESS NOTE ADULT - PROBLEM SELECTOR PLAN 5
URGENT CARE NOTE      CHIEF COMPLAINT:    Patient presents with a chief complaint of cough.    HPI:   Ortiz Piña is a 5 year old male who presents today accompanied by mother with chief complaint of cough, loose stools/diarrhea, reported fevers ongoing since Friday. Did get Tylenol this AM.  No ear pain.  Mild sore throat.  No vomiting.  No rashes.  Generally up-to-date on vaccinations.  Does attend a .  Presents today accompanied by 4-year-old brother who has similar symptoms.  Pediatrician is Dr. Wheeler.  4-year-old brother had symptoms before Ortiz did.  No difficulty breathing.  Has history of eczema.      ALLERGIES:    ALLERGIES:   Allergen Reactions   • Amoxicillin HIVES     Hives within 30 minutes of administration.   • Eggs Or Egg-Derived Products   (Food Or Med) ANAPHYLAXIS and SWELLING       CURRENT MEDICATIONS:    No current facility-administered medications for this encounter.     Current Outpatient Medications   Medication Sig Dispense Refill   • cetirizine (ZyrTEC Childrens Allergy) 5 MG/5ML solution Take 2.5 mLs by mouth daily. 118 mL 3   • mupirocin (BACTROBAN) 2 % ointment Apply topical three times a day to the face for 10 to 14 days. 22 g 0   • tacrolimus (PROTOPIC) 0.03 % ointment After 2 weeks of using topical steroids, start applying to face and body once daily Monday through Friday 100 g 1   • fluticasone (CUTIVATE) 0.005 % ointment Apply to body twice daily x 2 weeks, then twice daily on  weekends only. 60 g 1   • hydroCORTisone (CORTIZONE) 2.5 % ointment Apply to affected areas on face twice daily for 2 weeks, then twice daily on weekends only. 56.7 g 1   • hydroCORTisone (CORTIZONE) 1 % cream Apply topically 2 times daily on eczema patches on face for 1 to 2 weeks as needed. 30 g 1   • Skin Protectants, Misc. (CeraVe) Ointment Apply 1 application topically 2 times daily. 340 g 3   • EPINEPHrine (EPIPEN JR) 0.15 MG/0.3ML auto-injector Inject 0.3 mLs into the muscle 1 time for 1  dose. 1 each 3   • Skin Protectants, Misc. (CeraVe) Ointment Apply 1 application topically 2 times daily. 340 g 3   • EPINEPHrine (EPIPEN JR) 0.15 MG/0.3ML auto-injector Inject 0.3 mLs into the muscle 1 time for 1 dose. 1 each 3   • diphenhydrAMINE (BENADRYL) 12.5 MG/5ML liquid Take 12.5 mg by mouth every 6 hours as needed.         PAST MEDICAL HISTORY:    History reviewed. No pertinent past medical history.    SURGICAL HISTORY:    History reviewed. No pertinent surgical history.    SOCIAL HISTORY:           REVIEW OF SYSTEMS:    All other review of systems negative, except for those noted    PHYSICAL EXAM:    ED Triage Vitals [01/23/23 0850]   BP    Heart Rate 122   Resp 34   Temp 99.2 °F (37.3 °C)   SpO2 98 %     Vital signs and nursing assessment reviewed.  Constitutional:  Well-developed, well-nourished, no acute distress, afebrile  HENT:  Normocephalic. Atraumatic. Bilateral external ears normal. Tympanic membranes clear bilaterally.Throat is pink, without erythema or exudates.   Neck: Normal range of motion. No tenderness. Supple. No stridor. No nuchal rigidity.    Eyes:  PERRL, EOMI. Conjunctivae normal. No discharge.    Cardiovascular:  Regular rate and rhythm without murmurs, gallops or rubs. extremities are warm and well perfused. There is no lower extremity edema.     Respiratory:  Lungs are clear to auscultation in all fields bilaterally. No wheezes, rales or rhonchi. There is no increased work of breathing. No retractions.   Psych: Answers questions appropriately, pleasant mood, normal affect  Neuro: No slurred speech, no facial droop, freely using all extremities without impairment, reports normal sensation    Labs:  Results for orders placed or performed during the hospital encounter of 01/23/23   COVID/Flu/RSV panel   Result Value    Rapid SARS-COV-2 by PCR Not Detected    Influenza A by PCR Not Detected    Influenza B by PCR Not Detected    RSV BY PCR Not Detected    Isolation Guidelines       Comment: Do not use this test result as the sole decision-maker for discontinuation of isolation.   Clinical evaluation should be considered for other respiratory illness requiring transmission-based isolation.    -    No fever (<99.0 F/37.2 C) for at least 24 hours without the use of fever-reducing medications    AND  -    Respiratory symptoms have improved or resolved (e.g. cough, shortness of breath)     AND  -    COVID-19 negative test    See COVID-19 Deisolation Resource Guide    Procedural Comment      Comment: SARS-COV-2 nucleic acid has not been detected indicating the absence of COVID-19.    This test was performed using the [employer] Xpert Xpress SARS-CoV-2/Flu/RSV RT-PCR test that has been given Emergency Use Authorization (EUA) by the [place] Food and Drug Administration (FDA).  These tests are considered definitive and do not need to be confirmed by another method.         DIAGNOSIS:   ED Diagnoses     Diagnosis Comment Associated Orders       Final diagnoses    Cough, unspecified type -- --    Diarrhea, unspecified type -- --          Discharge Medication List as of 1/23/2023  9:20 AM          Follow Up:  Nathalia Wheeler MD  [facility] [address] [address]  [phone]           Patient was instructed to return to the ED immediately if symptoms worsen or any new unusual symptoms arise.    DISCHARGE PLAN OF CARE:  Patient with chief complaint and exam findings as above.  Suspect viral illness especially with sibling with similar symptoms.  No obvious bacterial infection noted on exam.  Temperature, heart rate within normal good no acute distress.  COVID, RSV, influenza test is ordered and this PCR test is pending at time of discharge.  I will contact mother if positive for any of the aforementioned viruses.  Symptomatic treatment is recommended, further dietary recommendations during times of diarrhea are included in after visit summary.  May follow-up with PCP in the next week.  Work  excuse provided for mother at request.    Closure:  The patient and/or guardian understands that this is a provisional diagnosis. Provisional diagnoses can and do change. The diagnosis that the patient is being discharged with today is based on the symptoms with which they presented today. If any new symptoms occur or worsen, the patient should seek immediate attention for re-evaluation.  Any symptoms that persist or fail to completely resolve require further evaluation by patient's other regular healthcare provider(s).       James Wilson PA-C  01/23/23 1010     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   - PEG Site appears macerated --> Multifactorial, possible the PEG has been too tight at home  - Peg loosened by GI at beside, no leakage or further intervention required   - recurrent RLQ abdominal pain and bleeding at the PEG site, resolved  - reevaluated by GI -- no bleeding at the PEG site  - 12/1: s/p abdominal ultrasound - limited study   - (12/3): Pt is c/o abd pain, and daughter is concerned, which resolved   - AM amylase and Lipase all wnl , CT A/P 12/3 - no acute findings  -- pt is tolerating tube feeds, + BMs   - Continue Simethicone    PEG Leak, resolved   - 12/20 silver nitrate applied to PEG site by GI

## 2024-01-13 NOTE — PROGRESS NOTE ADULT - PROBLEM SELECTOR PLAN 7
- Continue home Prednisone regimen 5 mg daily   *fibromyalgia  - continue home Fentanyl 25mcg Q72H patches  - c/w Lidocaine patch   - Oxycodone 2.5mg q6 hrs PRN (in addition to Tylenol PRN)  - 1/7-1/8 Right forearm and BL shoulder pain - w/u negative for DVT, imaging negative for acute fracture/dislocation, will continue with pain management as needed with meds above  - 1/9 increased gabapentin to 250mg PO, oxycodone increased to 5mg  -1/10: Gabapentin 100mg 6AM 2PM and 250mg HS

## 2024-01-13 NOTE — PROGRESS NOTE ADULT - TIME BILLING
Reviewing the EMR, vitals, imaging, medication list,, prior records and coordinating care with medical providers.

## 2024-01-13 NOTE — CHART NOTE - NSCHARTNOTEFT_GEN_A_CORE
Age: 74y    Gender: Female    POCT Blood Glucose:  134 mg/dL (01-13-24 @ 11:43)  155 mg/dL (01-13-24 @ 08:26)  103 mg/dL (01-13-24 @ 05:37)  156 mg/dL (01-12-24 @ 23:46)  239 mg/dL (01-12-24 @ 17:57)      eMAR:  insulin glargine Injectable (LANTUS)   21 Unit(s) SubCutaneous (01-13-24 @ 08:28)    insulin lispro (ADMELOG) corrective regimen sliding scale   2 Unit(s) SubCutaneous (01-13-24 @ 00:05)   4 Unit(s) SubCutaneous (01-12-24 @ 18:06)    insulin regular  human recombinant   11 Unit(s) SubCutaneous (01-12-24 @ 18:06)    insulin regular  human recombinant   20 Unit(s) SubCutaneous (01-13-24 @ 11:49)    insulin regular  human recombinant   42 Unit(s) SubCutaneous (01-13-24 @ 06:25)    levothyroxine   125 MICROGram(s) Oral (01-13-24 @ 06:28)    predniSONE  Solution   20 milliGRAM(s) Enteral Tube (01-13-24 @ 06:30)     POC glucose, insulin requirements, lab values reviewed. Prednisone taper noted, BG at goal today, recommedn reducing insulin doses to prevent hypoglycemia as steroids taper.       Type 2 diabetes mellitus with hyperglycemia, with long-term current use of insulin.   ·  Plan: - FS BG monitoring [id number] while on TFs/NPO   - Change lantus dose to 19 units sc qAM   - Adjust Regular insulin 38 units sc at 6am, 18 units sc at 12 noon and 9 units sc at 6pm. PLEASE DO NOT HOLD IF PATIENT IS ON TUBE FEEDS (hold only if TF are stopped). Can decrease/adjust dose if there is a concern for hypoglycemia.   - C/w low dose admelog correction scale every 6 hours  -Please infor endo if any changes to steroid doses from the taper noted above Age: 74y    Gender: Female    POCT Blood Glucose:  134 mg/dL (01-13-24 @ 11:43)  155 mg/dL (01-13-24 @ 08:26)  103 mg/dL (01-13-24 @ 05:37)  156 mg/dL (01-12-24 @ 23:46)  239 mg/dL (01-12-24 @ 17:57)      eMAR:  insulin glargine Injectable (LANTUS)   21 Unit(s) SubCutaneous (01-13-24 @ 08:28)    insulin lispro (ADMELOG) corrective regimen sliding scale   2 Unit(s) SubCutaneous (01-13-24 @ 00:05)   4 Unit(s) SubCutaneous (01-12-24 @ 18:06)    insulin regular  human recombinant   11 Unit(s) SubCutaneous (01-12-24 @ 18:06)    insulin regular  human recombinant   20 Unit(s) SubCutaneous (01-13-24 @ 11:49)    insulin regular  human recombinant   42 Unit(s) SubCutaneous (01-13-24 @ 06:25)    levothyroxine   125 MICROGram(s) Oral (01-13-24 @ 06:28)    predniSONE  Solution   20 milliGRAM(s) Enteral Tube (01-13-24 @ 06:30)     POC glucose, insulin requirements, lab values reviewed. Prednisone taper noted, BG at goal today, recommedn reducing insulin doses to prevent hypoglycemia as steroids taper.       Type 2 diabetes mellitus with hyperglycemia, with long-term current use of insulin.   ·  Plan: - FS BG monitoring [id number] while on TFs/NPO   - Change lantus dose to 19 units sc qAM   - Adjust Regular insulin 38 units sc at 6am, 18 units sc at 12 noon and 9 units sc at 6pm. PLEASE DO NOT HOLD IF PATIENT IS ON TUBE FEEDS (hold only if TF are stopped). Can decrease/adjust dose if there is a concern for hypoglycemia.   - C/w low dose admelog correction scale every 6 hours  -Please infor endo if any changes to steroid doses from the taper noted above

## 2024-01-13 NOTE — PROGRESS NOTE ADULT - NS ATTEND AMEND GEN_ALL_CORE FT
---73F with a h/o DM, HTN, HLD, anemia, hypothyroidism, RA, fibromyalgia, remote cerebral aneurysm with repair, hypercapnic respiratory failure s/p tracheostomy/PEG, bedbound, sacral pressure ulcer. Admitted w/ bleeding from tracheostomy and PEG w/ associated abdominal pain, recent hx of auditory/visual hallucinations. Imaging showed mild thickening along PEG tube tract and sacral ulcer extending to coccyx suggestive of acute osteomyelitis.    Remains on chronic mech vent, intermittently tolerating PS trials but unable to fully wean and unfortunately she will continue to be ventilator dependent  Tolerating PEG feeds  Sacral OM s/p 6 week course of Cipro and Keflex as per ID - completed 12/10/23. Appreciate wound care follow up.   Right shoulder pain likely from immobility (frozen shoulder) vs. inflammatory. c/w Neurontin and Oxycodone, Lidocaine patch and Voltaren. X-Ray with osteopenia, no fracture. UE dopplers negative. On Prednisone taper. Will consult Orthopedic surgery and pain management  Afebrile  >24 hours   Agree with plan as outlined above.  Discharge planning for early next week. d/w daughter Marysol over the phone.

## 2024-01-13 NOTE — PROGRESS NOTE ADULT - PROBLEM SELECTOR PLAN 11
- 1/12:  Code status: DNR/DNI.  Patient gave verbal consent at bedside to be DNR/DNI and did not want chest compressions performed if her heart were to stop.  Decision made in front of daughter and .  MOLST in chart and copy given to daughter.

## 2024-01-13 NOTE — PROGRESS NOTE ADULT - SUBJECTIVE AND OBJECTIVE BOX
Patient seen and examined at bedside. Continues to endorse right shoulder pain.     LABS:                        8.7    8.12  )-----------( 202      ( 12 Jan 2024 17:01 )             29.6     01-12    135  |  96  |  22  ----------------------------<  236<H>  4.4   |  28  |  <0.30<L>    Ca    10.3      12 Jan 2024 17:01  Phos  3.7     01-12  Mg     2.0     01-12        Urinalysis Basic - ( 12 Jan 2024 17:01 )    Color: x / Appearance: x / SG: x / pH: x  Gluc: 236 mg/dL / Ketone: x  / Bili: x / Urobili: x   Blood: x / Protein: x / Nitrite: x   Leuk Esterase: x / RBC: x / WBC x   Sq Epi: x / Non Sq Epi: x / Bacteria: x        VITAL SIGNS:  T(C): 36.7 (01-13-24 @ 21:06), Max: 36.7 (01-13-24 @ 12:24)  HR: 76 (01-13-24 @ 21:24) (73 - 99)  BP: 119/59 (01-13-24 @ 21:06) (119/59 - 138/68)  RR: 24 (01-13-24 @ 21:06) (15 - 24)  SpO2: 100% (01-13-24 @ 21:24) (98% - 100%)      PE:    General: NAD, resting comfortably in bed  RUE:   skin intact  Compartments soft and compressible  +Axillary/Musculocutaneous/Radial/Median/AIN/PIN intact  SILT gladis/saph/sp/dp/tib  no pain with micromotion  pain with pROM of shoulder  palpable radial pulses      XR Shoulder: ?calcific tendonitis (resorptive phase) - personal read        A/P:  74y F with atraumatic right shoulder pain    Differential diagnosis include calicific tendonitis, RA flare, OA flare, GH arthropathy, septic arthritis (though less likely).    -FU MRI Right shoulder   -Recommend toradol challenge  -Multimodal pain pain control: Tylenol, oxy, prn dilaudid  -Remainder of care per primary team  -PT/OT   -WBAT RUE  -will discuss with on call attending and advise with changes to plan

## 2024-01-14 LAB
ALBUMIN SERPL ELPH-MCNC: 3.7 G/DL — SIGNIFICANT CHANGE UP (ref 3.3–5)
ALBUMIN SERPL ELPH-MCNC: 3.7 G/DL — SIGNIFICANT CHANGE UP (ref 3.3–5)
ALP SERPL-CCNC: 61 U/L — SIGNIFICANT CHANGE UP (ref 40–120)
ALP SERPL-CCNC: 61 U/L — SIGNIFICANT CHANGE UP (ref 40–120)
ALT FLD-CCNC: 38 U/L — SIGNIFICANT CHANGE UP (ref 10–45)
ALT FLD-CCNC: 38 U/L — SIGNIFICANT CHANGE UP (ref 10–45)
ANION GAP SERPL CALC-SCNC: 15 MMOL/L — SIGNIFICANT CHANGE UP (ref 5–17)
ANION GAP SERPL CALC-SCNC: 15 MMOL/L — SIGNIFICANT CHANGE UP (ref 5–17)
ANISOCYTOSIS BLD QL: SIGNIFICANT CHANGE UP
ANISOCYTOSIS BLD QL: SIGNIFICANT CHANGE UP
APTT BLD: 28.5 SEC — SIGNIFICANT CHANGE UP (ref 24.5–35.6)
APTT BLD: 28.5 SEC — SIGNIFICANT CHANGE UP (ref 24.5–35.6)
AST SERPL-CCNC: 39 U/L — SIGNIFICANT CHANGE UP (ref 10–40)
AST SERPL-CCNC: 39 U/L — SIGNIFICANT CHANGE UP (ref 10–40)
BASOPHILS # BLD AUTO: 0 K/UL — SIGNIFICANT CHANGE UP (ref 0–0.2)
BASOPHILS # BLD AUTO: 0 K/UL — SIGNIFICANT CHANGE UP (ref 0–0.2)
BASOPHILS NFR BLD AUTO: 0 % — SIGNIFICANT CHANGE UP (ref 0–2)
BASOPHILS NFR BLD AUTO: 0 % — SIGNIFICANT CHANGE UP (ref 0–2)
BILIRUB SERPL-MCNC: 0.5 MG/DL — SIGNIFICANT CHANGE UP (ref 0.2–1.2)
BILIRUB SERPL-MCNC: 0.5 MG/DL — SIGNIFICANT CHANGE UP (ref 0.2–1.2)
BUN SERPL-MCNC: 17 MG/DL — SIGNIFICANT CHANGE UP (ref 7–23)
BUN SERPL-MCNC: 17 MG/DL — SIGNIFICANT CHANGE UP (ref 7–23)
CALCIUM SERPL-MCNC: 9.7 MG/DL — SIGNIFICANT CHANGE UP (ref 8.4–10.5)
CALCIUM SERPL-MCNC: 9.7 MG/DL — SIGNIFICANT CHANGE UP (ref 8.4–10.5)
CHLORIDE SERPL-SCNC: 94 MMOL/L — LOW (ref 96–108)
CHLORIDE SERPL-SCNC: 94 MMOL/L — LOW (ref 96–108)
CO2 SERPL-SCNC: 24 MMOL/L — SIGNIFICANT CHANGE UP (ref 22–31)
CO2 SERPL-SCNC: 24 MMOL/L — SIGNIFICANT CHANGE UP (ref 22–31)
CREAT SERPL-MCNC: <0.3 MG/DL — LOW (ref 0.5–1.3)
CREAT SERPL-MCNC: <0.3 MG/DL — LOW (ref 0.5–1.3)
EGFR: 113 ML/MIN/1.73M2 — SIGNIFICANT CHANGE UP
EGFR: 113 ML/MIN/1.73M2 — SIGNIFICANT CHANGE UP
EOSINOPHIL # BLD AUTO: 0.14 K/UL — SIGNIFICANT CHANGE UP (ref 0–0.5)
EOSINOPHIL # BLD AUTO: 0.14 K/UL — SIGNIFICANT CHANGE UP (ref 0–0.5)
EOSINOPHIL NFR BLD AUTO: 0.9 % — SIGNIFICANT CHANGE UP (ref 0–6)
EOSINOPHIL NFR BLD AUTO: 0.9 % — SIGNIFICANT CHANGE UP (ref 0–6)
GAS PNL BLDA: SIGNIFICANT CHANGE UP
GAS PNL BLDA: SIGNIFICANT CHANGE UP
GLUCOSE BLDC GLUCOMTR-MCNC: 106 MG/DL — HIGH (ref 70–99)
GLUCOSE BLDC GLUCOMTR-MCNC: 106 MG/DL — HIGH (ref 70–99)
GLUCOSE BLDC GLUCOMTR-MCNC: 116 MG/DL — HIGH (ref 70–99)
GLUCOSE BLDC GLUCOMTR-MCNC: 116 MG/DL — HIGH (ref 70–99)
GLUCOSE BLDC GLUCOMTR-MCNC: 124 MG/DL — HIGH (ref 70–99)
GLUCOSE BLDC GLUCOMTR-MCNC: 124 MG/DL — HIGH (ref 70–99)
GLUCOSE BLDC GLUCOMTR-MCNC: 154 MG/DL — HIGH (ref 70–99)
GLUCOSE BLDC GLUCOMTR-MCNC: 154 MG/DL — HIGH (ref 70–99)
GLUCOSE BLDC GLUCOMTR-MCNC: 169 MG/DL — HIGH (ref 70–99)
GLUCOSE BLDC GLUCOMTR-MCNC: 169 MG/DL — HIGH (ref 70–99)
GLUCOSE SERPL-MCNC: 239 MG/DL — HIGH (ref 70–99)
GLUCOSE SERPL-MCNC: 239 MG/DL — HIGH (ref 70–99)
HCT VFR BLD CALC: 32.3 % — LOW (ref 34.5–45)
HCT VFR BLD CALC: 32.3 % — LOW (ref 34.5–45)
HGB BLD-MCNC: 9.4 G/DL — LOW (ref 11.5–15.5)
HGB BLD-MCNC: 9.4 G/DL — LOW (ref 11.5–15.5)
INR BLD: 1.22 RATIO — HIGH (ref 0.85–1.18)
INR BLD: 1.22 RATIO — HIGH (ref 0.85–1.18)
LYMPHOCYTES # BLD AUTO: 38.8 % — SIGNIFICANT CHANGE UP (ref 13–44)
LYMPHOCYTES # BLD AUTO: 38.8 % — SIGNIFICANT CHANGE UP (ref 13–44)
LYMPHOCYTES # BLD AUTO: 5.89 K/UL — HIGH (ref 1–3.3)
LYMPHOCYTES # BLD AUTO: 5.89 K/UL — HIGH (ref 1–3.3)
MACROCYTES BLD QL: SLIGHT — SIGNIFICANT CHANGE UP
MACROCYTES BLD QL: SLIGHT — SIGNIFICANT CHANGE UP
MAGNESIUM SERPL-MCNC: 2 MG/DL — SIGNIFICANT CHANGE UP (ref 1.6–2.6)
MAGNESIUM SERPL-MCNC: 2 MG/DL — SIGNIFICANT CHANGE UP (ref 1.6–2.6)
MANUAL SMEAR VERIFICATION: SIGNIFICANT CHANGE UP
MANUAL SMEAR VERIFICATION: SIGNIFICANT CHANGE UP
MCHC RBC-ENTMCNC: 26.1 PG — LOW (ref 27–34)
MCHC RBC-ENTMCNC: 26.1 PG — LOW (ref 27–34)
MCHC RBC-ENTMCNC: 29.1 GM/DL — LOW (ref 32–36)
MCHC RBC-ENTMCNC: 29.1 GM/DL — LOW (ref 32–36)
MCV RBC AUTO: 89.7 FL — SIGNIFICANT CHANGE UP (ref 80–100)
MCV RBC AUTO: 89.7 FL — SIGNIFICANT CHANGE UP (ref 80–100)
METAMYELOCYTES # FLD: 0.9 % — HIGH (ref 0–0)
METAMYELOCYTES # FLD: 0.9 % — HIGH (ref 0–0)
MICROCYTES BLD QL: SLIGHT — SIGNIFICANT CHANGE UP
MICROCYTES BLD QL: SLIGHT — SIGNIFICANT CHANGE UP
MONOCYTES # BLD AUTO: 0.26 K/UL — SIGNIFICANT CHANGE UP (ref 0–0.9)
MONOCYTES # BLD AUTO: 0.26 K/UL — SIGNIFICANT CHANGE UP (ref 0–0.9)
MONOCYTES NFR BLD AUTO: 1.7 % — LOW (ref 2–14)
MONOCYTES NFR BLD AUTO: 1.7 % — LOW (ref 2–14)
MYELOCYTES NFR BLD: 1.7 % — HIGH (ref 0–0)
MYELOCYTES NFR BLD: 1.7 % — HIGH (ref 0–0)
NEUTROPHILS # BLD AUTO: 8.5 K/UL — HIGH (ref 1.8–7.4)
NEUTROPHILS # BLD AUTO: 8.5 K/UL — HIGH (ref 1.8–7.4)
NEUTROPHILS NFR BLD AUTO: 53.4 % — SIGNIFICANT CHANGE UP (ref 43–77)
NEUTROPHILS NFR BLD AUTO: 53.4 % — SIGNIFICANT CHANGE UP (ref 43–77)
NEUTS BAND # BLD: 2.6 % — SIGNIFICANT CHANGE UP (ref 0–8)
NEUTS BAND # BLD: 2.6 % — SIGNIFICANT CHANGE UP (ref 0–8)
PHOSPHATE SERPL-MCNC: 4.3 MG/DL — SIGNIFICANT CHANGE UP (ref 2.5–4.5)
PHOSPHATE SERPL-MCNC: 4.3 MG/DL — SIGNIFICANT CHANGE UP (ref 2.5–4.5)
PLAT MORPH BLD: NORMAL — SIGNIFICANT CHANGE UP
PLAT MORPH BLD: NORMAL — SIGNIFICANT CHANGE UP
PLATELET # BLD AUTO: 230 K/UL — SIGNIFICANT CHANGE UP (ref 150–400)
PLATELET # BLD AUTO: 230 K/UL — SIGNIFICANT CHANGE UP (ref 150–400)
POLYCHROMASIA BLD QL SMEAR: SIGNIFICANT CHANGE UP
POLYCHROMASIA BLD QL SMEAR: SIGNIFICANT CHANGE UP
POTASSIUM SERPL-MCNC: 4 MMOL/L — SIGNIFICANT CHANGE UP (ref 3.5–5.3)
POTASSIUM SERPL-MCNC: 4 MMOL/L — SIGNIFICANT CHANGE UP (ref 3.5–5.3)
POTASSIUM SERPL-SCNC: 4 MMOL/L — SIGNIFICANT CHANGE UP (ref 3.5–5.3)
POTASSIUM SERPL-SCNC: 4 MMOL/L — SIGNIFICANT CHANGE UP (ref 3.5–5.3)
PROT SERPL-MCNC: 8.3 G/DL — SIGNIFICANT CHANGE UP (ref 6–8.3)
PROT SERPL-MCNC: 8.3 G/DL — SIGNIFICANT CHANGE UP (ref 6–8.3)
PROTHROM AB SERPL-ACNC: 12.7 SEC — SIGNIFICANT CHANGE UP (ref 9.5–13)
PROTHROM AB SERPL-ACNC: 12.7 SEC — SIGNIFICANT CHANGE UP (ref 9.5–13)
RBC # BLD: 3.6 M/UL — LOW (ref 3.8–5.2)
RBC # BLD: 3.6 M/UL — LOW (ref 3.8–5.2)
RBC # FLD: 18.3 % — HIGH (ref 10.3–14.5)
RBC # FLD: 18.3 % — HIGH (ref 10.3–14.5)
RBC BLD AUTO: ABNORMAL
RBC BLD AUTO: ABNORMAL
SODIUM SERPL-SCNC: 133 MMOL/L — LOW (ref 135–145)
SODIUM SERPL-SCNC: 133 MMOL/L — LOW (ref 135–145)
WBC # BLD: 15.18 K/UL — HIGH (ref 3.8–10.5)
WBC # BLD: 15.18 K/UL — HIGH (ref 3.8–10.5)
WBC # FLD AUTO: 15.18 K/UL — HIGH (ref 3.8–10.5)
WBC # FLD AUTO: 15.18 K/UL — HIGH (ref 3.8–10.5)

## 2024-01-14 PROCEDURE — 99233 SBSQ HOSP IP/OBS HIGH 50: CPT | Mod: FS

## 2024-01-14 PROCEDURE — 70450 CT HEAD/BRAIN W/O DYE: CPT | Mod: 26

## 2024-01-14 PROCEDURE — 73223 MRI JOINT UPR EXTR W/O&W/DYE: CPT | Mod: 26,RT

## 2024-01-14 RX ADMIN — Medication 1 APPLICATION(S): at 12:40

## 2024-01-14 RX ADMIN — Medication 2 DROP(S): at 00:45

## 2024-01-14 RX ADMIN — SIMETHICONE 80 MILLIGRAM(S): 80 TABLET, CHEWABLE ORAL at 17:46

## 2024-01-14 RX ADMIN — Medication 3 MILLILITER(S): at 06:50

## 2024-01-14 RX ADMIN — NYSTATIN CREAM 1 APPLICATION(S): 100000 CREAM TOPICAL at 06:15

## 2024-01-14 RX ADMIN — Medication 2 DROP(S): at 17:48

## 2024-01-14 RX ADMIN — GABAPENTIN 250 MILLIGRAM(S): 400 CAPSULE ORAL at 23:05

## 2024-01-14 RX ADMIN — CHLORHEXIDINE GLUCONATE 1 APPLICATION(S): 213 SOLUTION TOPICAL at 06:16

## 2024-01-14 RX ADMIN — GABAPENTIN 100 MILLIGRAM(S): 400 CAPSULE ORAL at 06:15

## 2024-01-14 RX ADMIN — Medication 125 MICROGRAM(S): at 06:16

## 2024-01-14 RX ADMIN — Medication 3 MILLILITER(S): at 21:18

## 2024-01-14 RX ADMIN — Medication 5 MILLILITER(S): at 06:16

## 2024-01-14 RX ADMIN — GABAPENTIN 100 MILLIGRAM(S): 400 CAPSULE ORAL at 15:44

## 2024-01-14 RX ADMIN — Medication 5 MILLILITER(S): at 00:45

## 2024-01-14 RX ADMIN — PANTOPRAZOLE SODIUM 40 MILLIGRAM(S): 20 TABLET, DELAYED RELEASE ORAL at 08:06

## 2024-01-14 RX ADMIN — LIDOCAINE 5 MILLILITER(S): 4 CREAM TOPICAL at 06:16

## 2024-01-14 RX ADMIN — Medication 1 APPLICATION(S): at 17:49

## 2024-01-14 RX ADMIN — Medication 1 TABLET(S): at 17:46

## 2024-01-14 RX ADMIN — Medication 2 DROP(S): at 12:40

## 2024-01-14 RX ADMIN — INSULIN GLARGINE 19 UNIT(S): 100 INJECTION, SOLUTION SUBCUTANEOUS at 08:06

## 2024-01-14 RX ADMIN — Medication 1 APPLICATION(S): at 06:17

## 2024-01-14 RX ADMIN — Medication 1 MILLIGRAM(S): at 22:17

## 2024-01-14 RX ADMIN — ESCITALOPRAM OXALATE 10 MILLIGRAM(S): 10 TABLET, FILM COATED ORAL at 08:06

## 2024-01-14 RX ADMIN — NYSTATIN CREAM 1 APPLICATION(S): 100000 CREAM TOPICAL at 00:44

## 2024-01-14 RX ADMIN — Medication 5 MILLIGRAM(S): at 22:17

## 2024-01-14 RX ADMIN — LIDOCAINE 1 PATCH: 4 CREAM TOPICAL at 00:20

## 2024-01-14 RX ADMIN — SIMETHICONE 80 MILLIGRAM(S): 80 TABLET, CHEWABLE ORAL at 00:45

## 2024-01-14 RX ADMIN — Medication 500 MILLIGRAM(S): at 12:39

## 2024-01-14 RX ADMIN — SIMETHICONE 80 MILLIGRAM(S): 80 TABLET, CHEWABLE ORAL at 12:39

## 2024-01-14 RX ADMIN — INSULIN HUMAN 38 UNIT(S): 100 INJECTION, SOLUTION SUBCUTANEOUS at 06:14

## 2024-01-14 RX ADMIN — Medication 1 TABLET(S): at 06:15

## 2024-01-14 RX ADMIN — INSULIN HUMAN 9 UNIT(S): 100 INJECTION, SOLUTION SUBCUTANEOUS at 17:47

## 2024-01-14 RX ADMIN — NYSTATIN CREAM 1 APPLICATION(S): 100000 CREAM TOPICAL at 22:18

## 2024-01-14 RX ADMIN — ZINC OXIDE 1 APPLICATION(S): 200 OINTMENT TOPICAL at 12:40

## 2024-01-14 RX ADMIN — SIMETHICONE 80 MILLIGRAM(S): 80 TABLET, CHEWABLE ORAL at 06:15

## 2024-01-14 RX ADMIN — Medication 10 MILLIGRAM(S): at 06:17

## 2024-01-14 RX ADMIN — Medication 2 DROP(S): at 06:17

## 2024-01-14 RX ADMIN — QUETIAPINE FUMARATE 12.5 MILLIGRAM(S): 200 TABLET, FILM COATED ORAL at 17:46

## 2024-01-14 RX ADMIN — Medication 1 MILLIGRAM(S): at 12:44

## 2024-01-14 RX ADMIN — Medication 1 APPLICATION(S): at 00:44

## 2024-01-14 RX ADMIN — CHLORHEXIDINE GLUCONATE 15 MILLILITER(S): 213 SOLUTION TOPICAL at 06:16

## 2024-01-14 RX ADMIN — Medication 2: at 00:45

## 2024-01-14 RX ADMIN — AMLODIPINE BESYLATE 10 MILLIGRAM(S): 2.5 TABLET ORAL at 06:15

## 2024-01-14 RX ADMIN — PHENYLEPHRINE-SHARK LIVER OIL-MINERAL OIL-PETROLATUM RECTAL OINTMENT 1 APPLICATION(S): at 12:41

## 2024-01-14 RX ADMIN — Medication 15 MILLILITER(S): at 12:39

## 2024-01-14 NOTE — PROGRESS NOTE ADULT - ASSESSMENT
72 y/o F with PMHx of T2DM, HTN, HLD, anemia, hypothyroidism, RA, fibromyalgia, remote cerebral aneurysm repair, acute hypercapnic respiratory failure now with tracheostomy, PEG tube, and bedbound (trach change 8/18, PEG change 8/14), sacral pressure ulcer, BIBEMS from home for 6 day hx of bleeding from the tracheostomy and PEG tube with associated abdominal pain, recent history of auditory and visual hallucinations, and with chronic sacral decubitus ulcer. Exam notable for mild abdominal distention with LLQ and RLQ tenderness, tracheostomy in place with no obvious bleeding, PEG tube with scant amount of dried blood, at baseline neurologic status. Imaging showing no active bleeding around tracheostomy site, however was notable for mild thickening along PEG tube tract, sacral ulcer extending to the coccyx suggestive of possible osteomyelitis, and bibasilar patchy lung opacities with volume loss. Patient admitted for respiratory support, wound care of tracheostomy and PEG, and management of sacral osteomyelitis. No further Trach and peg site bleeding noted since admission.  Pelvic MRI imaging also suggestive of Acute OM. Patient placed on long-term antibiotics with Infectious disease guidance.  Additionally treated with a 7d course of Cefepime due to PSA and Serratia tracheitis on 11/8-->11/15 and then resumed cipro/keflex.  Hospital course c/b Delirium for which Seroquel was added and home Lexapro continued. Tracheostomy changed to #9 Cuffed [place] by ENT 11/3.  Despite trach change patient remained with persistent air leak.  On 11/19, the trach was again changed due to leak and loss of volumes on vent.  Trach was changed to #8 distal cuffed [place].  Patient seen by Dermatology for back lesions.  One diagnosed as a seborrheic keratitis and the other a dermatofibroma.  Intermittent abdominal pain throughout hospital course, at times relieved with gas/BM, w/u negative, seen by GI - no recs.  12/10 pt completed cipro and keflex for osteo treatment.  12/13 moderate amounts of secretions w/ blood - tranexamic acid neb given with notable improvement.  12/18 with blood tinged secretion again - TXA 250mg neb x2 doses.  12/19 spiked fever to 102 - pancultured, negative w/u.  12/19 silver Nitrate applied to PEG site by GI for leakage.  Pt has since remained medically stable.  1/7-1/8 Generalized pain, starting in forearm and BL shoulders in which an xray was performed and was negative for acute fractures/dislocation, doppler negative for DVT.  Will continue with pain management as needed.  Receives continuous pulmonary toileting w/ duoneb, chest PT, and suctioning PRN.      1/12: No acute events over night.  Orthopedic and pain management consult requested to evaluate persistent atraumatic right shoulder pain.  1/13 MRI of right  shoulder planned for 1/14  2 pm. CRP 99- multitude diagnosis to be considered per orthopedic evaluation .  74 y/o F with PMHx of T2DM, HTN, HLD, anemia, hypothyroidism, RA, fibromyalgia, remote cerebral aneurysm repair, acute hypercapnic respiratory failure now with tracheostomy, PEG tube, and bedbound (trach change 8/18, PEG change 8/14), sacral pressure ulcer, BIBEMS from home for 6 day hx of bleeding from the tracheostomy and PEG tube with associated abdominal pain, recent history of auditory and visual hallucinations, and with chronic sacral decubitus ulcer. Exam notable for mild abdominal distention with LLQ and RLQ tenderness, tracheostomy in place with no obvious bleeding, PEG tube with scant amount of dried blood, at baseline neurologic status. Imaging showing no active bleeding around tracheostomy site, however was notable for mild thickening along PEG tube tract, sacral ulcer extending to the coccyx suggestive of possible osteomyelitis, and bibasilar patchy lung opacities with volume loss. Patient admitted for respiratory support, wound care of tracheostomy and PEG, and management of sacral osteomyelitis. No further Trach and peg site bleeding noted since admission.  Pelvic MRI imaging also suggestive of Acute OM. Patient placed on long-term antibiotics with Infectious disease guidance.  Additionally treated with a 7d course of Cefepime due to PSA and Serratia tracheitis on 11/8-->11/15 and then resumed cipro/keflex.  Hospital course c/b Delirium for which Seroquel was added and home Lexapro continued. Tracheostomy changed to #9 Cuffed [place] by ENT 11/3.  Despite trach change patient remained with persistent air leak.  On 11/19, the trach was again changed due to leak and loss of volumes on vent.  Trach was changed to #8 distal cuffed [place]iley.  Patient seen by Dermatology for back lesions.  One diagnosed as a seborrheic keratitis and the other a dermatofibroma.  Intermittent abdominal pain throughout hospital course, at times relieved with gas/BM, w/u negative, seen by GI - no recs.  12/10 pt completed cipro and keflex for osteo treatment.  12/13 moderate amounts of secretions w/ blood - tranexamic acid neb given with notable improvement.  12/18 with blood tinged secretion again - TXA 250mg neb x2 doses.  12/19 spiked fever to 102 - pancultured, negative w/u.  12/19 silver Nitrate applied to PEG site by GI for leakage.  Pt has since remained medically stable.  1/7-1/8 Generalized pain, starting in forearm and BL shoulders in which an xray was performed and was negative for acute fractures/dislocation, doppler negative for DVT.  Will continue with pain management as needed.  Receives continuous pulmonary toileting w/ duoneb, chest PT, and suctioning PRN.      1/12: No acute events over night.  Orthopedic and pain management consult requested to evaluate persistent atraumatic right shoulder pain.  1/13 MRI of right  shoulder planned for 1/14  2 pm. CRP 99- multitude diagnosis to be considered per orthopedic evaluation .  74 y/o F with PMHx of T2DM, HTN, HLD, anemia, hypothyroidism, RA, fibromyalgia, remote cerebral aneurysm repair, acute hypercapnic respiratory failure now with tracheostomy, PEG tube, and bedbound (trach change 8/18, PEG change 8/14), sacral pressure ulcer, BIBEMS from home for 6 day hx of bleeding from the tracheostomy and PEG tube with associated abdominal pain, recent history of auditory and visual hallucinations, and with chronic sacral decubitus ulcer. Exam notable for mild abdominal distention with LLQ and RLQ tenderness, tracheostomy in place with no obvious bleeding, PEG tube with scant amount of dried blood, at baseline neurologic status. Imaging showing no active bleeding around tracheostomy site, however was notable for mild thickening along PEG tube tract, sacral ulcer extending to the coccyx suggestive of possible osteomyelitis, and bibasilar patchy lung opacities with volume loss. Patient admitted for respiratory support, wound care of tracheostomy and PEG, and management of sacral osteomyelitis. No further Trach and peg site bleeding noted since admission.  Pelvic MRI imaging also suggestive of Acute OM. Patient placed on long-term antibiotics with Infectious disease guidance.  Additionally treated with a 7d course of Cefepime due to PSA and Serratia tracheitis on 11/8-->11/15 and then resumed cipro/keflex.  Hospital course c/b Delirium for which Seroquel was added and home Lexapro continued. Tracheostomy changed to #9 Cuffed [place] by ENT 11/3.  Despite trach change patient remained with persistent air leak.  On 11/19, the trach was again changed due to leak and loss of volumes on vent.  Trach was changed to #8 distal cuffed [place].  Patient seen by Dermatology for back lesions.  One diagnosed as a seborrheic keratitis and the other a dermatofibroma.  Intermittent abdominal pain throughout hospital course, at times relieved with gas/BM, w/u negative, seen by GI - no recs.  12/10 pt completed cipro and keflex for osteo treatment.  12/13 moderate amounts of secretions w/ blood - tranexamic acid neb given with notable improvement.  12/18 with blood tinged secretion again - TXA 250mg neb x2 doses.  12/19 spiked fever to 102 - pancultured, negative w/u.  12/19 silver Nitrate applied to PEG site by GI for leakage.  Pt has since remained medically stable.  1/7-1/8 Generalized pain, starting in forearm and BL shoulders in which an xray was performed and was negative for acute fractures/dislocation, doppler negative for DVT.  Will continue with pain management as needed.  Receives continuous pulmonary toileting w/ duoneb, chest PT, and suctioning PRN.      1/14: No acute events over night.  Orthopedic and pain management consult requested to evaluate persistent atraumatic right shoulder pain.  MRI of right  shoulder completedconsidered per orthopedic evaluation.  During MRI vent circit6 disconnected O2 SAT's dropped quickly to 40%, patient was unresponsive, dusky ambued and slowly became more responsive.  CT head done. No untoward issues.

## 2024-01-14 NOTE — CHART NOTE - NSCHARTNOTEFT_GEN_A_CORE
Patient accompanied to MRI on ventilator by RT and NP.  Towards end of study it was noted that patient's O2 SAT's were dropping.  I directed radiology tech to stop machine and it was noted that circuit was disconnected from trach. O2 SAT's 40%, patient was unresponsive, dusky and appeared to have seized.  Patient ambued immediately, CODE called for assistance. Patient noted to have pulse.  CT scan obtained. Patient accompanied to MRI on ventilator by RT and NP.  Towards end of study it was noted that patient's O2 SAT's were dropping quickly.  I directed radiology tech to stop machine and it was noted that circuit was disconnected from trach. O2 SAT's 40%, patient was unresponsive, dusky and appeared to have seized.  Patient ambued immediately, CODE called for assistance. Patient noted to have pulse.  CT scan obtained.

## 2024-01-14 NOTE — PROGRESS NOTE ADULT - SUBJECTIVE AND OBJECTIVE BOX
Patient is a 74y old  Female who presents with a chief complaint of dyspnea, (13 Jan 2024 07:28)    HPI:  74 y/o F with PMHx of T2DM, HTN, HLD, anemia, hypothyroidism, RA, fibromyalgia, remote cerebral aneurysm repair, acute hypercapnic respiratory failure now with tracheostomy, PEG tube, and bedbound (trach change 8/18, PEG change 8/14), sacral pressure ulcer, BIBEMS from home for 6 day hx of bleeding from the tracheostomy and PEG tube with associated abdominal pain. Patient still having regular bowel movements, last one occurred prior to ED arrival. Was seen outpatient on 10/26 by critical care Dr. Zaki Gottlieb and apparently had cultures sent at that time. Patient also noted to have chronic sacral decubitous ulcer. Family endorsed one episode of fever, though was not febrile on evaluation. Daughter noted patient was recently experiencing auditory and visual hallucinations (seeing animals that were not there & hearing a "bomb" going off outside her home), though patient not actively hallucinating on evaluation.  ED course notable for CT neck imaging showing no evidence of active bleeding around the tracheostomy site, no hematomas, and no drainable collection around the tracheostomy site. CT abdomen/pelvis notable for gastrostomy tube localized to the stomach with mild thickening noted along the tract; a sacral decubitus ulcer extending to the coccyx with osseous remodeling, possibly representing osteomyelitis; and bibasilar patchy opacities with volume loss in the lungs, representing atelectasis vs infection. Patient admitted for respiratory support, wound care of tracheostomy and PEG, and management of presumed sacral osteomyelitis.   (28 Oct 2023 04:18)    Interval Events:    REVIEW OF SYSTEMS:  [ ] Positive  [ ] All other systems negative  [ ] Unable to assess ROS because ________    Vital Signs Last 24 Hrs  T(C): 36.7 (01-14-24 @ 05:22), Max: 36.7 (01-13-24 @ 12:24)  T(F): 98 (01-14-24 @ 05:22), Max: 98 (01-13-24 @ 12:24)  HR: 75 (01-14-24 @ 06:50) (69 - 89)  BP: 137/61 (01-14-24 @ 05:22) (119/59 - 137/61)  RR: 15 (01-14-24 @ 05:22) (15 - 24)  SpO2: 100% (01-14-24 @ 06:50) (98% - 100%)    PHYSICAL EXAM:  HEENT:   [ ]Tracheostomy:  [ ]Pupils equal  [ ]No oral lesions  [ ]Abnormal    SKIN  [ ] No Rash  [ ] Abnormal  [ ] pressure    CARDIAC  [ ]Regular  [ ]Abnormal    PULMONARY  [ ]Bilateral Clear Breath Sounds  [ ]Normal Excursion  [ ]Abnormal    GI  [ ]PEG      [ ] +BS		              [ ]Soft, nondistended, nontender	  [ ]Abnormal    MUSCULOSKELETAL                                   [ ]Bedbound                 [ ]Abnormal    [ ]Ambulatory/OOB to chair                           EXTREMITIES                                         [ ]Normal  [ ]Edema                           NEUROLOGIC  [ ] Normal, non focal  [ ] Focal findings:    PSYCHIATRIC  [ ]Alert and appropriate  [ ] Sedated	 [ ]Agitated    :  Mcbride: [ ] Yes, if yes: Date of Placement:                   [  ] No    LINES: Central Lines [ ] Yes, if yes: Date of Placement                                     [  ] No    HOSPITAL MEDICATIONS:  MEDICATIONS  (STANDING):  albuterol/ipratropium for Nebulization 3 milliLiter(s) Nebulizer every 8 hours  amLODIPine   Tablet 10 milliGRAM(s) Oral daily  artificial  tears Solution 2 Drop(s) Both EYES four times a day  ascorbic acid 500 milliGRAM(s) Oral daily  Biotene Dry Mouth Oral Rinse 5 milliLiter(s) Swish and Spit every 6 hours  calcium carbonate    500 mG (Tums) Chewable 1 Tablet(s) Chew every 12 hours  chlorhexidine 0.12% Liquid 15 milliLiter(s) Oral Mucosa every 12 hours  chlorhexidine 4% Liquid 1 Application(s) Topical <User Schedule>  dextrose 50% Injectable 12.5 Gram(s) IV Push once  dextrose 50% Injectable 25 Gram(s) IV Push once  escitalopram 10 milliGRAM(s) Oral <User Schedule>  fentaNYL   Patch  25 MICROgram(s)/Hr 1 Patch Transdermal every 72 hours  gabapentin Solution 100 milliGRAM(s) Oral <User Schedule>  gabapentin Solution 250 milliGRAM(s) Enteral Tube at bedtime  glucagon  Injectable 1 milliGRAM(s) IntraMuscular once  hemorrhoidal Ointment      hemorrhoidal Ointment 1 Application(s) Rectal daily  insulin glargine Injectable (LANTUS) 19 Unit(s) SubCutaneous <User Schedule>  insulin lispro (ADMELOG) corrective regimen sliding scale   SubCutaneous every 6 hours  insulin regular  human recombinant 9 Unit(s) SubCutaneous <User Schedule>  insulin regular  human recombinant 18 Unit(s) SubCutaneous <User Schedule>  insulin regular  human recombinant 38 Unit(s) SubCutaneous <User Schedule>  levothyroxine 125 MICROGram(s) Oral <User Schedule>  lidocaine   4% Patch 1 Patch Transdermal daily  lidocaine   4% Patch 1 Patch Transdermal daily  lidocaine 2% Viscous 5 milliLiter(s) Mucosal two times a day  melatonin 5 milliGRAM(s) Oral at bedtime  melatonin 1 milliGRAM(s) Oral at bedtime  multivitamin/minerals/iron Oral Solution (CENTRUM) 15 milliLiter(s) Oral daily  nystatin Powder 1 Application(s) Topical every 8 hours  pantoprazole   Suspension 40 milliGRAM(s) Enteral Tube <User Schedule>  petrolatum Ophthalmic Ointment 1 Application(s) Both EYES four times a day  QUEtiapine 12.5 milliGRAM(s) Oral <User Schedule>  silver nitrate Applicator 1 Application(s) Topical once  simethicone 80 milliGRAM(s) Chew four times a day  zinc oxide 40% Paste 1 Application(s) Topical daily    MEDICATIONS  (PRN):  acetaminophen   Oral Liquid .. 1000 milliGRAM(s) Oral every 6 hours PRN Temp greater or equal to 38.5C (101.3F)  benzocaine 20% Spray 1 Spray(s) Topical four times a day PRN Cough  diclofenac sodium 1% Gel 2 Gram(s) Topical four times a day PRN pain  diphenhydrAMINE Elixir 25 milliGRAM(s) Oral every 6 hours PRN Rash and/or Itching  guaifenesin/dextromethorphan Oral Liquid 10 milliLiter(s) Oral every 6 hours PRN Cough  lidocaine   4% Patch 2 Patch Transdermal daily PRN shoulder pain  oxyCODONE    Solution 5 milliGRAM(s) Oral every 6 hours PRN Moderate Pain (4 - 6)  sodium chloride 0.65% Nasal 1 Spray(s) Both Nostrils every 6 hours PRN Nasal Congestion      LABS:                        8.7    8.12  )-----------( 202      ( 12 Jan 2024 17:01 )             29.6     01-12    135  |  96  |  22  ----------------------------<  236<H>  4.4   |  28  |  <0.30<L>    Ca    10.3      12 Jan 2024 17:01  Phos  3.7     01-12  Mg     2.0     01-12    Urinalysis Basic - ( 12 Jan 2024 17:01 )    Color: x / Appearance: x / SG: x / pH: x  Gluc: 236 mg/dL / Ketone: x  / Bili: x / Urobili: x   Blood: x / Protein: x / Nitrite: x   Leuk Esterase: x / RBC: x / WBC x   Sq Epi: x / Non Sq Epi: x / Bacteria: x      Mode: AC/ CMV (Assist Control/ Continuous Mandatory Ventilation)  RR (machine): 12  TV (machine): 300  FiO2: 30  PEEP: 5  ITime: 1  MAP: 8  PIP: 24 Patient is a 74y old  Female who presents with a chief complaint of dyspnea, (13 Jan 2024 07:28)    HPI:  74 y/o F with PMHx of T2DM, HTN, HLD, anemia, hypothyroidism, RA, fibromyalgia, remote cerebral aneurysm repair, acute hypercapnic respiratory failure now with tracheostomy, PEG tube, and bedbound (trach change 8/18, PEG change 8/14), sacral pressure ulcer, BIBEMS from home for 6 day hx of bleeding from the tracheostomy and PEG tube with associated abdominal pain. Patient still having regular bowel movements, last one occurred prior to ED arrival. Was seen outpatient on 10/26 by critical care Dr. Zaki Gottlieb and apparently had cultures sent at that time. Patient also noted to have chronic sacral decubitous ulcer. Family endorsed one episode of fever, though was not febrile on evaluation. Daughter noted patient was recently experiencing auditory and visual hallucinations (seeing animals that were not there & hearing a "bomb" going off outside her home), though patient not actively hallucinating on evaluation.  ED course notable for CT neck imaging showing no evidence of active bleeding around the tracheostomy site, no hematomas, and no drainable collection around the tracheostomy site. CT abdomen/pelvis notable for gastrostomy tube localized to the stomach with mild thickening noted along the tract; a sacral decubitus ulcer extending to the coccyx with osseous remodeling, possibly representing osteomyelitis; and bibasilar patchy opacities with volume loss in the lungs, representing atelectasis vs infection. Patient admitted for respiratory support, wound care of tracheostomy and PEG, and management of presumed sacral osteomyelitis.   (28 Oct 2023 04:18)    Interval Events: No issues overnight    REVIEW OF SYSTEMS:  [ ] Positive  [ x] All other systems negative  [ ] Unable to assess ROS because ________    Vital Signs Last 24 Hrs  T(C): 36.7 (01-14-24 @ 05:22), Max: 36.7 (01-13-24 @ 12:24)  T(F): 98 (01-14-24 @ 05:22), Max: 98 (01-13-24 @ 12:24)  HR: 75 (01-14-24 @ 06:50) (69 - 89)  BP: 137/61 (01-14-24 @ 05:22) (119/59 - 137/61)  RR: 15 (01-14-24 @ 05:22) (15 - 24)  SpO2: 100% (01-14-24 @ 06:50) (98% - 100%)    PHYSICAL EXAM:  HEENT:   [X]Tracheostomy: #8 distal XLT Shiley  [X]Pupils equal  [ ]No oral lesions  [X] Abnormal: missing dentition; +teeth caries; +loose bottom teeth    SKIN  [ ]No Rash  [ ] Abnormal  [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    CARDIAC  [X]Regular  [ ]Abnormal    PULMONARY  [ ]Bilateral Clear Breath Sounds  [ ]Normal Excursion  [X]Abnormal - BL Coarse BS    GI  [X]PEG      [X] +BS		              [X]Soft, nondistended, nontender	  [ ]Abnormal    MUSCULOSKELETAL                                   [X]Bedbound                 [X] Abnormal: Pain with passive ROM of right shoulder, tender ACJ/bicipital groove on palpation without swelling, fluctuance or bony abnormalities observed.  [ ]Ambulatory/OOB to chair                           EXTREMITIES                                         [X]Normal  [ ]Edema                           NEUROLOGIC  [ ] Normal, non focal  [X] Focal findings: opens eyes spontaneously, follows commands on all 4 extremities    PSYCHIATRIC  [X]Alert and appropriate  [ ] Sedated	 [ ]Agitated    :  Mcbride: [ ] Yes, if yes: Date of Placement:                   [X] No    LINES: Central Lines [ ] Yes, if yes: Date of Placement                                     [X] No    HOSPITAL MEDICATIONS:  MEDICATIONS  (STANDING):  albuterol/ipratropium for Nebulization 3 milliLiter(s) Nebulizer every 8 hours  amLODIPine   Tablet 10 milliGRAM(s) Oral daily  artificial  tears Solution 2 Drop(s) Both EYES four times a day  ascorbic acid 500 milliGRAM(s) Oral daily  Biotene Dry Mouth Oral Rinse 5 milliLiter(s) Swish and Spit every 6 hours  calcium carbonate    500 mG (Tums) Chewable 1 Tablet(s) Chew every 12 hours  chlorhexidine 0.12% Liquid 15 milliLiter(s) Oral Mucosa every 12 hours  chlorhexidine 4% Liquid 1 Application(s) Topical <User Schedule>  dextrose 50% Injectable 12.5 Gram(s) IV Push once  dextrose 50% Injectable 25 Gram(s) IV Push once  escitalopram 10 milliGRAM(s) Oral <User Schedule>  fentaNYL   Patch  25 MICROgram(s)/Hr 1 Patch Transdermal every 72 hours  gabapentin Solution 100 milliGRAM(s) Oral <User Schedule>  gabapentin Solution 250 milliGRAM(s) Enteral Tube at bedtime  glucagon  Injectable 1 milliGRAM(s) IntraMuscular once  hemorrhoidal Ointment      hemorrhoidal Ointment 1 Application(s) Rectal daily  insulin glargine Injectable (LANTUS) 19 Unit(s) SubCutaneous <User Schedule>  insulin lispro (ADMELOG) corrective regimen sliding scale   SubCutaneous every 6 hours  insulin regular  human recombinant 9 Unit(s) SubCutaneous <User Schedule>  insulin regular  human recombinant 18 Unit(s) SubCutaneous <User Schedule>  insulin regular  human recombinant 38 Unit(s) SubCutaneous <User Schedule>  levothyroxine 125 MICROGram(s) Oral <User Schedule>  lidocaine   4% Patch 1 Patch Transdermal daily  lidocaine   4% Patch 1 Patch Transdermal daily  lidocaine 2% Viscous 5 milliLiter(s) Mucosal two times a day  melatonin 5 milliGRAM(s) Oral at bedtime  melatonin 1 milliGRAM(s) Oral at bedtime  multivitamin/minerals/iron Oral Solution (CENTRUM) 15 milliLiter(s) Oral daily  nystatin Powder 1 Application(s) Topical every 8 hours  pantoprazole   Suspension 40 milliGRAM(s) Enteral Tube <User Schedule>  petrolatum Ophthalmic Ointment 1 Application(s) Both EYES four times a day  QUEtiapine 12.5 milliGRAM(s) Oral <User Schedule>  silver nitrate Applicator 1 Application(s) Topical once  simethicone 80 milliGRAM(s) Chew four times a day  zinc oxide 40% Paste 1 Application(s) Topical daily    MEDICATIONS  (PRN):  acetaminophen   Oral Liquid .. 1000 milliGRAM(s) Oral every 6 hours PRN Temp greater or equal to 38.5C (101.3F)  benzocaine 20% Spray 1 Spray(s) Topical four times a day PRN Cough  diclofenac sodium 1% Gel 2 Gram(s) Topical four times a day PRN pain  diphenhydrAMINE Elixir 25 milliGRAM(s) Oral every 6 hours PRN Rash and/or Itching  guaifenesin/dextromethorphan Oral Liquid 10 milliLiter(s) Oral every 6 hours PRN Cough  lidocaine   4% Patch 2 Patch Transdermal daily PRN shoulder pain  oxyCODONE    Solution 5 milliGRAM(s) Oral every 6 hours PRN Moderate Pain (4 - 6)  sodium chloride 0.65% Nasal 1 Spray(s) Both Nostrils every 6 hours PRN Nasal Congestion      LABS:                        8.7    8.12  )-----------( 202      ( 12 Jan 2024 17:01 )             29.6     01-12    135  |  96  |  22  ----------------------------<  236<H>  4.4   |  28  |  <0.30<L>    Ca    10.3      12 Jan 2024 17:01  Phos  3.7     01-12  Mg     2.0     01-12    Urinalysis Basic - ( 12 Jan 2024 17:01 )    Color: x / Appearance: x / SG: x / pH: x  Gluc: 236 mg/dL / Ketone: x  / Bili: x / Urobili: x   Blood: x / Protein: x / Nitrite: x   Leuk Esterase: x / RBC: x / WBC x   Sq Epi: x / Non Sq Epi: x / Bacteria: x      Mode: AC/ CMV (Assist Control/ Continuous Mandatory Ventilation)  RR (machine): 12  TV (machine): 300  FiO2: 30  PEEP: 5  ITime: 1  MAP: 8  PIP: 24

## 2024-01-14 NOTE — RAPID RESPONSE TEAM SUMMARY - NSSITUATIONBACKGROUNDRRT_GEN_ALL_CORE
74F with a h/o DM, HTN, HLD, anemia, hypothyroidism, RA, fibromyalgia, remote cerebral aneurysm with repair, hypercapnic respiratory failure s/p tracheostomy/PEG, bedbound, sacral pressure ulcer.     Code blue was called because the patient was briefly disconnected from her ventilator while in MRI. However, the patient was never pulseless and did not suffer cardiac arrest, so the code blue was downgraded to a rapid response. Per the patient's provider, the patient was disconnected from her vent for <1 minute, during which time she desaturated to the 40's. The patient got bagged to bring her oxygen sat back to 100% and was reconnected to the vent. She was hypertensive 190s/80s and tachycardic to 110s. On arrival, the patient was minimally arousable but over the course of ~10-15 mins, she slowly returned to her baseline, following commands and answering questions. A full set of labs and CT scan of the head were ordered. Her vital signs stabilized, and the patient was brought back to the RCU for continued care.

## 2024-01-14 NOTE — PROVIDER CONTACT NOTE (OTHER) - ASSESSMENT
Fs 218
Patient is not in any form of distress
Pt denies respiratory distress or pain
Spo2= 100% on vent
Vital signs stable  Patient is not in distress
Alert, follows simple commands, /90 , o2  on the vent
Alert, periods of confusion, temp 100 orally this morning,
Pt tube feeds of after 3pm for cleaning
PT AOx2, anxious
Pt stable. Denies any complaints
Patient is not in distress
tylenol given for pain prn; patient upset because daughter told her that private aide wont stay overnight; pt doesn't want to be alone
Patient is not in distress
patient c/o head ache
pt finger stick was 76, no s/s of hypoglycemia, resting comfortably
Pt denies respiratory distress, provided with prn pain medicine to help with pain felt by patient on her right upper extremity; BP stable

## 2024-01-14 NOTE — PROGRESS NOTE ADULT - SUBJECTIVE AND OBJECTIVE BOX
Patient seen and examined at bedside. Continues to endorse right shoulder pain.         PE:    General: NAD, resting comfortably in bed  RUE:   skin intact  Compartments soft and compressible  +Axillary/Musculocutaneous/Radial/Median/AIN/PIN intact  SILT gladis/saph/sp/dp/tib  no pain with micromotion  pain with pROM of shoulder  palpable radial pulses      XR Shoulder: ?calcific tendonitis (resorptive phase) - personal read        A/P:  74y F with atraumatic right shoulder pain    Differential diagnosis include calcific tendonitis, RA flare, OA flare, GH arthropathy, septic arthritis (though less likely).    -FU MRI Right shoulder w/wo, if positive ortho v IR to aspirate  -Recommend toradol challenge  -Multimodal pain pain control: Tylenol, oxy, prn dilaudid  -Remainder of care per primary team  -PT/OT   -WBAT RUE  -will discuss with on call attending and advise with changes to plan

## 2024-01-14 NOTE — PROVIDER CONTACT NOTE (OTHER) - REASON
Pt with small amount  of blood at Peg site
temp 102 orally
Patient noted with edema in left hand
Patient noted with small amount of blood around trach and on lower lip
Patient spiked a rectal temp of 100.6
Secretion in suction canister noted blood tinged
small katie blood noted during trach suctioning
PT finger stick 76
Pt finger stick 108 in 6 pm hour
blood sugar 467 and repeat 419
6 am lantus not given
Oral temp=100.8
Temp 100.7F
Temp 100.8  rectal
/113 
Oral Temp of 101.1
Patient daughter requests to speak to MD regarding medication times and other concerns
Pt has blood tinged tracheal secretions

## 2024-01-14 NOTE — PROGRESS NOTE ADULT - PROBLEM SELECTOR PLAN 9
Vmail full.  Try later   Continue Home Lexapro 10mg daily   Psych consult appreciated   - Continue Seroquel 12.5mg daily (6PM)  - pt is calm and cooperative No

## 2024-01-14 NOTE — PROGRESS NOTE ADULT - NS ATTEND AMEND GEN_ALL_CORE FT
----------73F with a h/o DM, HTN, HLD, anemia, hypothyroidism, RA, fibromyalgia, remote cerebral aneurysm with repair, hypercapnic respiratory failure s/p tracheostomy/PEG, bedbound, sacral pressure ulcer. Admitted w/ bleeding from tracheostomy and PEG w/ associated abdominal pain, recent hx of auditory/visual hallucinations. Imaging showed mild thickening along PEG tube tract and sacral ulcer extending to coccyx suggestive of acute osteomyelitis.    Remains on chronic mech vent, intermittently tolerating PS trials but unable to fully wean and unfortunately she will continue to be ventilator dependent  Tolerating PEG feeds  Sacral OM s/p 6 week course of Cipro and Keflex as per ID - completed 12/10/23. Appreciate wound care follow up.   Right shoulder pain likely from immobility (frozen shoulder) vs. inflammatory. c/w Neurontin and Oxycodone, Lidocaine patch and Voltaren. X-Ray with osteopenia, no fracture. UE dopplers negative. On Prednisone taper. as per  t Orthopedic surgery and pain management --mri shoulder -   d/w  in great details

## 2024-01-14 NOTE — CHART NOTE - NSCHARTNOTEFT_GEN_A_CORE
Unfortunately, patient had a desaturation episode in the MRI suite and code was called.  MRI was discontinued and results reviewed and discussed with Daughter  Given the presence of effusion and/or synovial thickening on MRI (though image very degraded), we recommend arthrocentesis to rule out infection (either at bedside or per IR.  That said, given the recent events today, daughter would like some time to discuss with family about aspiration and further goals of care.    A&P  74y Female with complex past medical history including RA, now with 1 week of  progressing Left shoulder pain. On exam she had exquisite TTP throughout her shoulder and glenohumeral joint and limited ROM 2/2 pain. Unclear etiology of pain at this point. Elevated inflammatory markers (though could be multifactorial)  -MRI with effusion and/or synovial thickening. Differential diagnosis include RA flare, OA flare, GH arthropathy, biceps tendonitis, septic arthritis (though less likely).    -Pending family decision regarding aspiration  -Multimodal pain pain control: Tylenol, Toradol, oxy/dilaudid prn  -Recommend Rheumatology consult (per daughter patient's rheumatologist is Dr. Mckenna Owens)  -Remainder of care per primary team  -Discussed with attending surgeon on call    Orthopaedic Surgery  [facility] [place] [id number]  J        [id number]  [facility]  [id number]/ [phone] Unfortunately, patient had a desaturation episode in the MRI suite and code was called.  MRI was discontinued and results reviewed and discussed with Daughter  Given the presence of effusion and/or synovial thickening on MRI (though image very degraded), we recommend arthrocentesis to rule out infection (either at bedside or per IR.  That said, given the recent events today, daughter would like some time to discuss with family about aspiration and further goals of care.    A&P  74y Female with complex past medical history including RA, now with 1 week of  progressing Left shoulder pain. On exam she had exquisite TTP throughout her shoulder and glenohumeral joint and limited ROM 2/2 pain. Unclear etiology of pain at this point. Elevated inflammatory markers (though could be multifactorial)  -MRI with effusion and/or synovial thickening. Differential diagnosis include RA flare, OA flare, GH arthropathy, biceps tendonitis, septic arthritis (though less likely).    -Pending family decision regarding aspiration  -Multimodal pain pain control: Tylenol, Toradol, oxy/dilaudid prn  -Recommend Rheumatology consult (per daughter patient's rheumatologist is Dr. Mckenna Owens)  -Remainder of care per primary team  -Discussed with attending surgeon on call    Orthopaedic Surgery  [facility] [place] [id number]  J        [id number]  [facility]  [id number]/ [phone]

## 2024-01-14 NOTE — PROVIDER CONTACT NOTE (OTHER) - SITUATION
/113 
Patient daughter would like to speak to MD regarding patient medication times and other concerns
Patients temp 100.8 rectal
Pt spiked oral temp of 101.1
Patient spiked a rectal temp of 100.6
blood sugar 467 and repeat is 419, on Lantus 10 units Daily, received at 0800
Pt has blood tinged tracheal secretions
Pt oral temp=100.8
Pt received regular insulin and Admelog in 12 noon hour. Because of frequent  episodes of bowel incontinence feeds were held for care
Temp 100.7F
6 am lantus not given. Current . Ordered lantus from pharmacy. Pending medication delivery
Patient denies biting lips
Small katie blood noted during trach suctioning
PT finger stick was 76
pt has 102 oral temp, noon any  antibiotics, or no recent cultures

## 2024-01-14 NOTE — PROVIDER CONTACT NOTE (OTHER) - NAME OF MD/NP/PA/DO NOTIFIED:
Corin Ness
Eloy Gillespie
PA Everjameste
Isaura Castro NP
Sylvain CORONEL
Verna Lyons
Yoselin Rivas
Sylvain CORONEL
PRITI Cordova
Calcin Rubio CORONEL
Donis Castro
Janak CORONEL
PRITI Lawson
Verna Lyons
BERNA Gillespie
Rob Wyatt
Georges CORONEL
PA Rubio

## 2024-01-15 LAB
GLUCOSE BLDC GLUCOMTR-MCNC: 110 MG/DL — HIGH (ref 70–99)
GLUCOSE BLDC GLUCOMTR-MCNC: 110 MG/DL — HIGH (ref 70–99)
GLUCOSE BLDC GLUCOMTR-MCNC: 116 MG/DL — HIGH (ref 70–99)
GLUCOSE BLDC GLUCOMTR-MCNC: 116 MG/DL — HIGH (ref 70–99)
GLUCOSE BLDC GLUCOMTR-MCNC: 80 MG/DL — SIGNIFICANT CHANGE UP (ref 70–99)
GRAM STN FLD: SIGNIFICANT CHANGE UP
SPECIMEN SOURCE: SIGNIFICANT CHANGE UP
SYNOVIAL CRYSTALS CLARITY: ABNORMAL
SYNOVIAL CRYSTALS CLARITY: ABNORMAL
SYNOVIAL CRYSTALS COLOR: ABNORMAL
SYNOVIAL CRYSTALS COLOR: ABNORMAL
SYNOVIAL CRYSTALS ID: SIGNIFICANT CHANGE UP
SYNOVIAL CRYSTALS ID: SIGNIFICANT CHANGE UP
SYNOVIAL CRYSTALS TUBE: SIGNIFICANT CHANGE UP
SYNOVIAL CRYSTALS TUBE: SIGNIFICANT CHANGE UP

## 2024-01-15 PROCEDURE — 99233 SBSQ HOSP IP/OBS HIGH 50: CPT | Mod: FS

## 2024-01-15 RX ORDER — INSULIN HUMAN 100 [IU]/ML
34 INJECTION, SOLUTION SUBCUTANEOUS
Refills: 0 | Status: DISCONTINUED | OUTPATIENT
Start: 2024-01-16 | End: 2024-01-17

## 2024-01-15 RX ORDER — KETOROLAC TROMETHAMINE 30 MG/ML
15 SYRINGE (ML) INJECTION EVERY 6 HOURS
Refills: 0 | Status: DISCONTINUED | OUTPATIENT
Start: 2024-01-15 | End: 2024-01-17

## 2024-01-15 RX ORDER — KETOROLAC TROMETHAMINE 30 MG/ML
15 SYRINGE (ML) INJECTION ONCE
Refills: 0 | Status: DISCONTINUED | OUTPATIENT
Start: 2024-01-15 | End: 2024-01-15

## 2024-01-15 RX ORDER — ACETAMINOPHEN 500 MG
545 TABLET ORAL EVERY 6 HOURS
Refills: 0 | Status: DISCONTINUED | OUTPATIENT
Start: 2024-01-15 | End: 2024-01-17

## 2024-01-15 RX ORDER — INSULIN HUMAN 100 [IU]/ML
17 INJECTION, SOLUTION SUBCUTANEOUS
Refills: 0 | Status: DISCONTINUED | OUTPATIENT
Start: 2024-01-16 | End: 2024-01-17

## 2024-01-15 RX ADMIN — PHENYLEPHRINE-SHARK LIVER OIL-MINERAL OIL-PETROLATUM RECTAL OINTMENT 1 APPLICATION(S): at 11:30

## 2024-01-15 RX ADMIN — SIMETHICONE 80 MILLIGRAM(S): 80 TABLET, CHEWABLE ORAL at 21:56

## 2024-01-15 RX ADMIN — ZINC OXIDE 1 APPLICATION(S): 200 OINTMENT TOPICAL at 11:31

## 2024-01-15 RX ADMIN — CHLORHEXIDINE GLUCONATE 1 APPLICATION(S): 213 SOLUTION TOPICAL at 05:23

## 2024-01-15 RX ADMIN — Medication 500 MILLIGRAM(S): at 11:28

## 2024-01-15 RX ADMIN — LIDOCAINE 1 PATCH: 4 CREAM TOPICAL at 11:28

## 2024-01-15 RX ADMIN — INSULIN HUMAN 38 UNIT(S): 100 INJECTION, SOLUTION SUBCUTANEOUS at 05:56

## 2024-01-15 RX ADMIN — SIMETHICONE 80 MILLIGRAM(S): 80 TABLET, CHEWABLE ORAL at 17:15

## 2024-01-15 RX ADMIN — Medication 1 APPLICATION(S): at 18:43

## 2024-01-15 RX ADMIN — LIDOCAINE 1 PATCH: 4 CREAM TOPICAL at 19:25

## 2024-01-15 RX ADMIN — INSULIN HUMAN 9 UNIT(S): 100 INJECTION, SOLUTION SUBCUTANEOUS at 17:58

## 2024-01-15 RX ADMIN — Medication 1 MILLIGRAM(S): at 21:55

## 2024-01-15 RX ADMIN — Medication 1 APPLICATION(S): at 00:17

## 2024-01-15 RX ADMIN — GABAPENTIN 100 MILLIGRAM(S): 400 CAPSULE ORAL at 05:22

## 2024-01-15 RX ADMIN — Medication 5 MILLILITER(S): at 05:25

## 2024-01-15 RX ADMIN — Medication 3 MILLILITER(S): at 14:38

## 2024-01-15 RX ADMIN — Medication 2 DROP(S): at 21:56

## 2024-01-15 RX ADMIN — Medication 15 MILLILITER(S): at 11:27

## 2024-01-15 RX ADMIN — Medication 5 MILLIGRAM(S): at 05:22

## 2024-01-15 RX ADMIN — Medication 5 MILLIGRAM(S): at 21:55

## 2024-01-15 RX ADMIN — SIMETHICONE 80 MILLIGRAM(S): 80 TABLET, CHEWABLE ORAL at 00:17

## 2024-01-15 RX ADMIN — GABAPENTIN 250 MILLIGRAM(S): 400 CAPSULE ORAL at 21:55

## 2024-01-15 RX ADMIN — Medication 2 DROP(S): at 00:17

## 2024-01-15 RX ADMIN — NYSTATIN CREAM 1 APPLICATION(S): 100000 CREAM TOPICAL at 21:56

## 2024-01-15 RX ADMIN — GABAPENTIN 100 MILLIGRAM(S): 400 CAPSULE ORAL at 16:31

## 2024-01-15 RX ADMIN — QUETIAPINE FUMARATE 12.5 MILLIGRAM(S): 200 TABLET, FILM COATED ORAL at 17:15

## 2024-01-15 RX ADMIN — Medication 2 DROP(S): at 05:24

## 2024-01-15 RX ADMIN — Medication 1 APPLICATION(S): at 05:25

## 2024-01-15 RX ADMIN — SIMETHICONE 80 MILLIGRAM(S): 80 TABLET, CHEWABLE ORAL at 11:28

## 2024-01-15 RX ADMIN — Medication 545 MILLIGRAM(S): at 17:15

## 2024-01-15 RX ADMIN — CHLORHEXIDINE GLUCONATE 15 MILLILITER(S): 213 SOLUTION TOPICAL at 05:25

## 2024-01-15 RX ADMIN — LIDOCAINE 1 PATCH: 4 CREAM TOPICAL at 00:26

## 2024-01-15 RX ADMIN — Medication 1 TABLET(S): at 17:14

## 2024-01-15 RX ADMIN — Medication 15 MILLIGRAM(S): at 01:00

## 2024-01-15 RX ADMIN — Medication 1 TABLET(S): at 05:21

## 2024-01-15 RX ADMIN — NYSTATIN CREAM 1 APPLICATION(S): 100000 CREAM TOPICAL at 05:24

## 2024-01-15 RX ADMIN — Medication 2 DROP(S): at 11:31

## 2024-01-15 RX ADMIN — Medication 5 MILLILITER(S): at 21:57

## 2024-01-15 RX ADMIN — INSULIN GLARGINE 19 UNIT(S): 100 INJECTION, SOLUTION SUBCUTANEOUS at 08:16

## 2024-01-15 RX ADMIN — ESCITALOPRAM OXALATE 10 MILLIGRAM(S): 10 TABLET, FILM COATED ORAL at 08:16

## 2024-01-15 RX ADMIN — Medication 2 DROP(S): at 17:59

## 2024-01-15 RX ADMIN — Medication 545 MILLIGRAM(S): at 21:55

## 2024-01-15 RX ADMIN — Medication 15 MILLIGRAM(S): at 09:22

## 2024-01-15 RX ADMIN — Medication 125 MICROGRAM(S): at 05:21

## 2024-01-15 RX ADMIN — INSULIN HUMAN 18 UNIT(S): 100 INJECTION, SOLUTION SUBCUTANEOUS at 12:31

## 2024-01-15 RX ADMIN — LIDOCAINE 5 MILLILITER(S): 4 CREAM TOPICAL at 05:56

## 2024-01-15 RX ADMIN — PANTOPRAZOLE SODIUM 40 MILLIGRAM(S): 20 TABLET, DELAYED RELEASE ORAL at 08:16

## 2024-01-15 RX ADMIN — SIMETHICONE 80 MILLIGRAM(S): 80 TABLET, CHEWABLE ORAL at 05:22

## 2024-01-15 RX ADMIN — Medication 1 APPLICATION(S): at 21:56

## 2024-01-15 RX ADMIN — Medication 5 MILLILITER(S): at 00:16

## 2024-01-15 RX ADMIN — NYSTATIN CREAM 1 APPLICATION(S): 100000 CREAM TOPICAL at 14:32

## 2024-01-15 RX ADMIN — Medication 1 APPLICATION(S): at 11:30

## 2024-01-15 RX ADMIN — Medication 3 MILLILITER(S): at 05:18

## 2024-01-15 RX ADMIN — Medication 3 MILLILITER(S): at 21:11

## 2024-01-15 RX ADMIN — AMLODIPINE BESYLATE 10 MILLIGRAM(S): 2.5 TABLET ORAL at 05:21

## 2024-01-15 NOTE — PROGRESS NOTE ADULT - SUBJECTIVE AND OBJECTIVE BOX
Orthopedic Surgery Progress Note     S: Patient seen and examined today. No acute events overnight. Pain is well controlled. Denies f/c, chest pain, shortness of breath, dizziness.    MEDICATIONS  (STANDING):  albuterol/ipratropium for Nebulization 3 milliLiter(s) Nebulizer every 8 hours  amLODIPine   Tablet 10 milliGRAM(s) Oral daily  artificial  tears Solution 2 Drop(s) Both EYES four times a day  ascorbic acid 500 milliGRAM(s) Oral daily  Biotene Dry Mouth Oral Rinse 5 milliLiter(s) Swish and Spit every 6 hours  calcium carbonate    500 mG (Tums) Chewable 1 Tablet(s) Chew every 12 hours  chlorhexidine 0.12% Liquid 15 milliLiter(s) Oral Mucosa every 12 hours  chlorhexidine 4% Liquid 1 Application(s) Topical <User Schedule>  dextrose 50% Injectable 12.5 Gram(s) IV Push once  dextrose 50% Injectable 25 Gram(s) IV Push once  escitalopram 10 milliGRAM(s) Oral <User Schedule>  fentaNYL   Patch  25 MICROgram(s)/Hr 1 Patch Transdermal every 72 hours  gabapentin Solution 250 milliGRAM(s) Enteral Tube at bedtime  gabapentin Solution 100 milliGRAM(s) Oral <User Schedule>  glucagon  Injectable 1 milliGRAM(s) IntraMuscular once  hemorrhoidal Ointment      hemorrhoidal Ointment 1 Application(s) Rectal daily  insulin glargine Injectable (LANTUS) 19 Unit(s) SubCutaneous <User Schedule>  insulin lispro (ADMELOG) corrective regimen sliding scale   SubCutaneous every 6 hours  insulin regular  human recombinant 18 Unit(s) SubCutaneous <User Schedule>  insulin regular  human recombinant 38 Unit(s) SubCutaneous <User Schedule>  insulin regular  human recombinant 9 Unit(s) SubCutaneous <User Schedule>  levothyroxine 125 MICROGram(s) Oral <User Schedule>  lidocaine   4% Patch 1 Patch Transdermal daily  lidocaine   4% Patch 1 Patch Transdermal daily  lidocaine 2% Viscous 5 milliLiter(s) Mucosal two times a day  melatonin 5 milliGRAM(s) Oral at bedtime  melatonin 1 milliGRAM(s) Oral at bedtime  multivitamin/minerals/iron Oral Solution (CENTRUM) 15 milliLiter(s) Oral daily  nystatin Powder 1 Application(s) Topical every 8 hours  pantoprazole   Suspension 40 milliGRAM(s) Enteral Tube <User Schedule>  petrolatum Ophthalmic Ointment 1 Application(s) Both EYES four times a day  predniSONE  Solution 5 milliGRAM(s) Oral <User Schedule>  QUEtiapine 12.5 milliGRAM(s) Oral <User Schedule>  silver nitrate Applicator 1 Application(s) Topical once  simethicone 80 milliGRAM(s) Chew four times a day  zinc oxide 40% Paste 1 Application(s) Topical daily    MEDICATIONS  (PRN):  acetaminophen   Oral Liquid .. 1000 milliGRAM(s) Oral every 6 hours PRN Temp greater or equal to 38.5C (101.3F)  benzocaine 20% Spray 1 Spray(s) Topical four times a day PRN Cough  diclofenac sodium 1% Gel 2 Gram(s) Topical four times a day PRN pain  diphenhydrAMINE Elixir 25 milliGRAM(s) Oral every 6 hours PRN Rash and/or Itching  guaifenesin/dextromethorphan Oral Liquid 10 milliLiter(s) Oral every 6 hours PRN Cough  lidocaine   4% Patch 2 Patch Transdermal daily PRN shoulder pain  oxyCODONE    Solution 5 milliGRAM(s) Oral every 6 hours PRN Moderate Pain (4 - 6)  sodium chloride 0.65% Nasal 1 Spray(s) Both Nostrils every 6 hours PRN Nasal Congestion      Vital Signs Last 24 Hrs  T(C): 36.8 (15 Noe 2024 04:55), Max: 37 (14 Jan 2024 12:57)  T(F): 98.3 (15 Noe 2024 04:55), Max: 98.6 (14 Jan 2024 12:57)  HR: 76 (15 Noe 2024 04:55) (69 - 93)  BP: 118/74 (15 Noe 2024 04:55) (118/74 - 144/68)  BP(mean): --  RR: 15 (15 Noe 2024 04:55) (15 - 16)  SpO2: 100% (15 Noe 2024 04:55) (100% - 100%)    Parameters below as of 15 Noe 2024 04:55  Patient On (Oxygen Delivery Method): ventilator        01-13-24 @ 07:01  -  01-14-24 @ 07:00  --------------------------------------------------------  IN: 1450 mL / OUT: 725 mL / NET: 725 mL    01-14-24 @ 07:01  -  01-15-24 @ 06:57  --------------------------------------------------------  IN: 660 mL / OUT: 0 mL / NET: 660 mL        Physical Exam:  Gen: NAD  Incision c/d/i    LABS:                        9.4    15.18 )-----------( 230      ( 14 Jan 2024 14:20 )             32.3     01-14    133<L>  |  94<L>  |  17  ----------------------------<  239<H>  4.0   |  24  |  <0.30<L>    Ca    9.7      14 Jan 2024 14:20  Phos  4.3     01-14  Mg     2.0     01-14    TPro  8.3  /  Alb  3.7  /  TBili  0.5  /  DBili  x   /  AST  39  /  ALT  38  /  AlkPhos  61  01-14   Orthopedic Surgery Progress Note     S: Patient seen and examined today. No acute events overnight. Pain is well controlled. Denies f/c, chest pain, shortness of breath, dizziness.    MEDICATIONS  (STANDING):  albuterol/ipratropium for Nebulization 3 milliLiter(s) Nebulizer every 8 hours  amLODIPine   Tablet 10 milliGRAM(s) Oral daily  artificial  tears Solution 2 Drop(s) Both EYES four times a day  ascorbic acid 500 milliGRAM(s) Oral daily  Biotene Dry Mouth Oral Rinse 5 milliLiter(s) Swish and Spit every 6 hours  calcium carbonate    500 mG (Tums) Chewable 1 Tablet(s) Chew every 12 hours  chlorhexidine 0.12% Liquid 15 milliLiter(s) Oral Mucosa every 12 hours  chlorhexidine 4% Liquid 1 Application(s) Topical <User Schedule>  dextrose 50% Injectable 12.5 Gram(s) IV Push once  dextrose 50% Injectable 25 Gram(s) IV Push once  escitalopram 10 milliGRAM(s) Oral <User Schedule>  fentaNYL   Patch  25 MICROgram(s)/Hr 1 Patch Transdermal every 72 hours  gabapentin Solution 250 milliGRAM(s) Enteral Tube at bedtime  gabapentin Solution 100 milliGRAM(s) Oral <User Schedule>  glucagon  Injectable 1 milliGRAM(s) IntraMuscular once  hemorrhoidal Ointment      hemorrhoidal Ointment 1 Application(s) Rectal daily  insulin glargine Injectable (LANTUS) 19 Unit(s) SubCutaneous <User Schedule>  insulin lispro (ADMELOG) corrective regimen sliding scale   SubCutaneous every 6 hours  insulin regular  human recombinant 18 Unit(s) SubCutaneous <User Schedule>  insulin regular  human recombinant 38 Unit(s) SubCutaneous <User Schedule>  insulin regular  human recombinant 9 Unit(s) SubCutaneous <User Schedule>  levothyroxine 125 MICROGram(s) Oral <User Schedule>  lidocaine   4% Patch 1 Patch Transdermal daily  lidocaine   4% Patch 1 Patch Transdermal daily  lidocaine 2% Viscous 5 milliLiter(s) Mucosal two times a day  melatonin 5 milliGRAM(s) Oral at bedtime  melatonin 1 milliGRAM(s) Oral at bedtime  multivitamin/minerals/iron Oral Solution (CENTRUM) 15 milliLiter(s) Oral daily  nystatin Powder 1 Application(s) Topical every 8 hours  pantoprazole   Suspension 40 milliGRAM(s) Enteral Tube <User Schedule>  petrolatum Ophthalmic Ointment 1 Application(s) Both EYES four times a day  predniSONE  Solution 5 milliGRAM(s) Oral <User Schedule>  QUEtiapine 12.5 milliGRAM(s) Oral <User Schedule>  silver nitrate Applicator 1 Application(s) Topical once  simethicone 80 milliGRAM(s) Chew four times a day  zinc oxide 40% Paste 1 Application(s) Topical daily    MEDICATIONS  (PRN):  acetaminophen   Oral Liquid .. 1000 milliGRAM(s) Oral every 6 hours PRN Temp greater or equal to 38.5C (101.3F)  benzocaine 20% Spray 1 Spray(s) Topical four times a day PRN Cough  diclofenac sodium 1% Gel 2 Gram(s) Topical four times a day PRN pain  diphenhydrAMINE Elixir 25 milliGRAM(s) Oral every 6 hours PRN Rash and/or Itching  guaifenesin/dextromethorphan Oral Liquid 10 milliLiter(s) Oral every 6 hours PRN Cough  lidocaine   4% Patch 2 Patch Transdermal daily PRN shoulder pain  oxyCODONE    Solution 5 milliGRAM(s) Oral every 6 hours PRN Moderate Pain (4 - 6)  sodium chloride 0.65% Nasal 1 Spray(s) Both Nostrils every 6 hours PRN Nasal Congestion      Vital Signs Last 24 Hrs  T(C): 36.8 (15 Noe 2024 04:55), Max: 37 (14 Jan 2024 12:57)  T(F): 98.3 (15 Noe 2024 04:55), Max: 98.6 (14 Jan 2024 12:57)  HR: 76 (15 Noe 2024 04:55) (69 - 93)  BP: 118/74 (15 Noe 2024 04:55) (118/74 - 144/68)  BP(mean): --  RR: 15 (15 Noe 2024 04:55) (15 - 16)  SpO2: 100% (15 Noe 2024 04:55) (100% - 100%)    Parameters below as of 15 Noe 2024 04:55  Patient On (Oxygen Delivery Method): ventilator        01-13-24 @ 07:01  -  01-14-24 @ 07:00  --------------------------------------------------------  IN: 1450 mL / OUT: 725 mL / NET: 725 mL    01-14-24 @ 07:01  -  01-15-24 @ 06:57  --------------------------------------------------------  IN: 660 mL / OUT: 0 mL / NET: 660 mL        Physical Exam:  Gen: NAD  skin intact, no erythema  Compartments soft and compressible  palpable radial pulses      LABS:                        9.4    15.18 )-----------( 230      ( 14 Jan 2024 14:20 )             32.3     01-14    133<L>  |  94<L>  |  17  ----------------------------<  239<H>  4.0   |  24  |  <0.30<L>    Ca    9.7      14 Jan 2024 14:20  Phos  4.3     01-14  Mg     2.0     01-14    TPro  8.3  /  Alb  3.7  /  TBili  0.5  /  DBili  x   /  AST  39  /  ALT  38  /  AlkPhos  61  01-14

## 2024-01-15 NOTE — PROGRESS NOTE ADULT - PROBLEM SELECTOR PLAN 2
Detail Level: Simple 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   - S/p CT abd/pelvis (10/28): Sacral decubitus ulcer extending to the coccyx with osseous remodeling and pelvic MRI or bone scan to recc to exclude osteomyelitis.  - 10/30 wound care note: Sacral stage 4 pressure injury 4.5cm x 2.5cm x 1.5cm w/ lip of undermining circumferentially greatest 9-12 of 3cm.  - MRI pelvis 10/30 with Osteomyelitis, completed Cefepime for 7 days, Vancomycin d/marli   - 11/10: resumed Cipro 500mg q 12 and Keflex 500mg q 6 until 12/10.  - 11/20: Changed to IV Cipro 400 q12 as recommended by ID pharmacist due to multiple drug interactions when given via PEG  - 11/28 wound care note: Sacral stage 4 pressure injury 4.5cm x 2.5cm x 0.5cm, moist granular wound bed   palpable but not exposed bone no blistering, (+) serosanguinous drainage. No odor, erythema, increased warmth, tenderness, induration, fluctuance, nor crepitus.  - 12/3 wound culture ordered - d/c'd as no need for culture, wound improving, no signs of infection  - 12/10 completed cipro and keflex x5 week course  - wound improving, wound care following

## 2024-01-15 NOTE — CHART NOTE - NSCHARTNOTEFT_GEN_A_CORE
After verbal consent was obtained from the patient, the patient's right shoulder was aseptically prepped using betadine. Next, an 18ga needle was inserted into the joint and ~1cc of bloody fluid was aspirated and the sample was sent for analysis. Hemostasis was then achieved with direct pressure and a band aid applied. Patient was NVI preprocedure and post-procedure. Patient tolerated procedure well without any acute complications.

## 2024-01-15 NOTE — PROGRESS NOTE ADULT - ASSESSMENT
74y F with atraumatic right shoulder pain, MRI showing glenohumeral effusion and/or synovial thickening. Differential diagnosis include calcific tendonitis, RA flare, OA flare, GH arthropathy, septic arthritis (though less likely).    -F/u family re: tapping R shoulder joint  -Recommend toradol challenge  -Multimodal pain pain control: Tylenol, oxy, prn dilaudid  -Remainder of care per primary team  -PT/OT   -WBAT RUE

## 2024-01-15 NOTE — CHART NOTE - NSCHARTNOTEFT_GEN_A_CORE
Held extensive discussion with pts daughter regarding R/B/As of shoulder aspiration to rule out septic joint. At this time, the family has not come to a decision and will require further discussion.    Please page orthopedics on call resident once family has decided to pursue aspiration.  pager [phone] Held extensive discussion with pts daughter regarding R/B/As of shoulder aspiration to rule out septic joint. At this time, the family has not come to a decision and will require further discussion.    Please page orthopedics on call resident once family has decided to pursue aspiration.  pager [phone] Held extensive discussion with pts daughter regarding R/B/As of shoulder aspiration to rule out septic joint. At this time, the family has not come to a decision and will require further discussion.    Please page orthopedics on call resident once family has decided to pursue aspiration.  pager [phone]

## 2024-01-15 NOTE — PROGRESS NOTE ADULT - SUBJECTIVE AND OBJECTIVE BOX
Patient is a 74y old  Female who presents with a chief complaint of dyspnea, (13 Jan 2024 07:28)    HPI:  74 y/o F with PMHx of T2DM, HTN, HLD, anemia, hypothyroidism, RA, fibromyalgia, remote cerebral aneurysm repair, acute hypercapnic respiratory failure now with tracheostomy, PEG tube, and bedbound (trach change 8/18, PEG change 8/14), sacral pressure ulcer, BIBEMS from home for 6 day hx of bleeding from the tracheostomy and PEG tube with associated abdominal pain. Patient still having regular bowel movements, last one occurred prior to ED arrival. Was seen outpatient on 10/26 by critical care Dr. Zaki Gottlieb and apparently had cultures sent at that time. Patient also noted to have chronic sacral decubitous ulcer. Family endorsed one episode of fever, though was not febrile on evaluation. Daughter noted patient was recently experiencing auditory and visual hallucinations (seeing animals that were not there & hearing a "bomb" going off outside her home), though patient not actively hallucinating on evaluation.  ED course notable for CT neck imaging showing no evidence of active bleeding around the tracheostomy site, no hematomas, and no drainable collection around the tracheostomy site. CT abdomen/pelvis notable for gastrostomy tube localized to the stomach with mild thickening noted along the tract; a sacral decubitus ulcer extending to the coccyx with osseous remodeling, possibly representing osteomyelitis; and bibasilar patchy opacities with volume loss in the lungs, representing atelectasis vs infection. Patient admitted for respiratory support, wound care of tracheostomy and PEG, and management of presumed sacral osteomyelitis.   (28 Oct 2023 04:18)    Interval Events:    REVIEW OF SYSTEMS:  [ ] Positive  [ ] All other systems negative  [ ] Unable to assess ROS because ________    Vital Signs Last 24 Hrs  T(C): 36.8 (01-15-24 @ 04:55), Max: 37 (01-14-24 @ 12:57)  T(F): 98.3 (01-15-24 @ 04:55), Max: 98.6 (01-14-24 @ 12:57)  HR: 75 (01-15-24 @ 05:18) (69 - 93)  BP: 118/74 (01-15-24 @ 04:55) (118/74 - 144/68)  RR: 15 (01-15-24 @ 04:55) (15 - 16)  SpO2: 100% (01-15-24 @ 05:18) (100% - 100%)    PHYSICAL EXAM:  HEENT:   [ ]Tracheostomy:  [ ]Pupils equal  [ ]No oral lesions  [ ]Abnormal    SKIN  [ ] No Rash  [ ] Abnormal  [ ] pressure    CARDIAC  [ ]Regular  [ ]Abnormal    PULMONARY  [ ]Bilateral Clear Breath Sounds  [ ]Normal Excursion  [ ]Abnormal    GI  [ ]PEG      [ ] +BS		              [ ]Soft, nondistended, nontender	  [ ]Abnormal    MUSCULOSKELETAL                                   [ ]Bedbound                 [ ]Abnormal    [ ]Ambulatory/OOB to chair                           EXTREMITIES                                         [ ]Normal  [ ]Edema                           NEUROLOGIC  [ ] Normal, non focal  [ ] Focal findings:    PSYCHIATRIC  [ ]Alert and appropriate  [ ] Sedated	 [ ]Agitated    :  Mcbride: [ ] Yes, if yes: Date of Placement:                   [  ] No    LINES: Central Lines [ ] Yes, if yes: Date of Placement                                     [  ] No    HOSPITAL MEDICATIONS:  MEDICATIONS  (STANDING):  albuterol/ipratropium for Nebulization 3 milliLiter(s) Nebulizer every 8 hours  amLODIPine   Tablet 10 milliGRAM(s) Oral daily  artificial  tears Solution 2 Drop(s) Both EYES four times a day  ascorbic acid 500 milliGRAM(s) Oral daily  Biotene Dry Mouth Oral Rinse 5 milliLiter(s) Swish and Spit every 6 hours  calcium carbonate    500 mG (Tums) Chewable 1 Tablet(s) Chew every 12 hours  chlorhexidine 0.12% Liquid 15 milliLiter(s) Oral Mucosa every 12 hours  chlorhexidine 4% Liquid 1 Application(s) Topical <User Schedule>  dextrose 50% Injectable 12.5 Gram(s) IV Push once  dextrose 50% Injectable 25 Gram(s) IV Push once  escitalopram 10 milliGRAM(s) Oral <User Schedule>  fentaNYL   Patch  25 MICROgram(s)/Hr 1 Patch Transdermal every 72 hours  gabapentin Solution 100 milliGRAM(s) Oral <User Schedule>  gabapentin Solution 250 milliGRAM(s) Enteral Tube at bedtime  glucagon  Injectable 1 milliGRAM(s) IntraMuscular once  hemorrhoidal Ointment 1 Application(s) Rectal daily  hemorrhoidal Ointment      insulin glargine Injectable (LANTUS) 19 Unit(s) SubCutaneous <User Schedule>  insulin lispro (ADMELOG) corrective regimen sliding scale   SubCutaneous every 6 hours  insulin regular  human recombinant 9 Unit(s) SubCutaneous <User Schedule>  insulin regular  human recombinant 18 Unit(s) SubCutaneous <User Schedule>  insulin regular  human recombinant 38 Unit(s) SubCutaneous <User Schedule>  levothyroxine 125 MICROGram(s) Oral <User Schedule>  lidocaine   4% Patch 1 Patch Transdermal daily  lidocaine   4% Patch 1 Patch Transdermal daily  lidocaine 2% Viscous 5 milliLiter(s) Mucosal two times a day  melatonin 1 milliGRAM(s) Oral at bedtime  melatonin 5 milliGRAM(s) Oral at bedtime  multivitamin/minerals/iron Oral Solution (CENTRUM) 15 milliLiter(s) Oral daily  nystatin Powder 1 Application(s) Topical every 8 hours  pantoprazole   Suspension 40 milliGRAM(s) Enteral Tube <User Schedule>  petrolatum Ophthalmic Ointment 1 Application(s) Both EYES four times a day  predniSONE  Solution 5 milliGRAM(s) Oral <User Schedule>  QUEtiapine 12.5 milliGRAM(s) Oral <User Schedule>  silver nitrate Applicator 1 Application(s) Topical once  simethicone 80 milliGRAM(s) Chew four times a day  zinc oxide 40% Paste 1 Application(s) Topical daily    MEDICATIONS  (PRN):  acetaminophen   Oral Liquid .. 1000 milliGRAM(s) Oral every 6 hours PRN Temp greater or equal to 38.5C (101.3F)  benzocaine 20% Spray 1 Spray(s) Topical four times a day PRN Cough  diclofenac sodium 1% Gel 2 Gram(s) Topical four times a day PRN pain  diphenhydrAMINE Elixir 25 milliGRAM(s) Oral every 6 hours PRN Rash and/or Itching  guaifenesin/dextromethorphan Oral Liquid 10 milliLiter(s) Oral every 6 hours PRN Cough  lidocaine   4% Patch 2 Patch Transdermal daily PRN shoulder pain  oxyCODONE    Solution 5 milliGRAM(s) Oral every 6 hours PRN Moderate Pain (4 - 6)  sodium chloride 0.65% Nasal 1 Spray(s) Both Nostrils every 6 hours PRN Nasal Congestion      LABS:                        9.4    15.18 )-----------( 230      ( 14 Jan 2024 14:20 )             32.3     01-14    133<L>  |  94<L>  |  17  ----------------------------<  239<H>  4.0   |  24  |  <0.30<L>    Ca    9.7      14 Jan 2024 14:20  Phos  4.3     01-14  Mg     2.0     01-14    TPro  8.3  /  Alb  3.7  /  TBili  0.5  /  DBili  x   /  AST  39  /  ALT  38  /  AlkPhos  61  01-14    PT/INR - ( 14 Jan 2024 14:20 )   PT: 12.7 sec;   INR: 1.22 ratio         PTT - ( 14 Jan 2024 14:20 )  PTT:28.5 sec  Urinalysis Basic - ( 14 Jan 2024 14:20 )    Color: x / Appearance: x / SG: x / pH: x  Gluc: 239 mg/dL / Ketone: x  / Bili: x / Urobili: x   Blood: x / Protein: x / Nitrite: x   Leuk Esterase: x / RBC: x / WBC x   Sq Epi: x / Non Sq Epi: x / Bacteria: x    Arterial Blood Gas:  01-14 @ 14:17  7.36/52/108/29/98.8/3.2  ABG lactate: --      Mode: AC/ CMV (Assist Control/ Continuous Mandatory Ventilation)  RR (machine): 12  TV (machine): 300  FiO2: 40  PEEP: 5  PS:   ITime: 1  MAP: 10  PIP: 24   Patient is a 74y old  Female who presents with a chief complaint of dyspnea, (13 Jan 2024 07:28)    HPI:  72 y/o F with PMHx of T2DM, HTN, HLD, anemia, hypothyroidism, RA, fibromyalgia, remote cerebral aneurysm repair, acute hypercapnic respiratory failure now with tracheostomy, PEG tube, and bedbound (trach change 8/18, PEG change 8/14), sacral pressure ulcer, BIBEMS from home for 6 day hx of bleeding from the tracheostomy and PEG tube with associated abdominal pain. Patient still having regular bowel movements, last one occurred prior to ED arrival. Was seen outpatient on 10/26 by critical care Dr. Zaki Gottlieb and apparently had cultures sent at that time. Patient also noted to have chronic sacral decubitous ulcer. Family endorsed one episode of fever, though was not febrile on evaluation. Daughter noted patient was recently experiencing auditory and visual hallucinations (seeing animals that were not there & hearing a "bomb" going off outside her home), though patient not actively hallucinating on evaluation.  ED course notable for CT neck imaging showing no evidence of active bleeding around the tracheostomy site, no hematomas, and no drainable collection around the tracheostomy site. CT abdomen/pelvis notable for gastrostomy tube localized to the stomach with mild thickening noted along the tract; a sacral decubitus ulcer extending to the coccyx with osseous remodeling, possibly representing osteomyelitis; and bibasilar patchy opacities with volume loss in the lungs, representing atelectasis vs infection. Patient admitted for respiratory support, wound care of tracheostomy and PEG, and management of presumed sacral osteomyelitis.   (28 Oct 2023 04:18)    Interval Events:    REVIEW OF SYSTEMS:  [ ] Positive  [ ] All other systems negative  [ ] Unable to assess ROS because ________    Vital Signs Last 24 Hrs  T(C): 36.8 (01-15-24 @ 04:55), Max: 37 (01-14-24 @ 12:57)  T(F): 98.3 (01-15-24 @ 04:55), Max: 98.6 (01-14-24 @ 12:57)  HR: 75 (01-15-24 @ 05:18) (69 - 93)  BP: 118/74 (01-15-24 @ 04:55) (118/74 - 144/68)  RR: 15 (01-15-24 @ 04:55) (15 - 16)  SpO2: 100% (01-15-24 @ 05:18) (100% - 100%)    PHYSICAL EXAM:  HEENT:   [ ]Tracheostomy:  [ ]Pupils equal  [ ]No oral lesions  [ ]Abnormal    SKIN  [ ] No Rash  [ ] Abnormal  [ ] pressure    CARDIAC  [ ]Regular  [ ]Abnormal    PULMONARY  [ ]Bilateral Clear Breath Sounds  [ ]Normal Excursion  [ ]Abnormal    GI  [ ]PEG      [ ] +BS		              [ ]Soft, nondistended, nontender	  [ ]Abnormal    MUSCULOSKELETAL                                   [ ]Bedbound                 [ ]Abnormal    [ ]Ambulatory/OOB to chair                           EXTREMITIES                                         [ ]Normal  [ ]Edema                           NEUROLOGIC  [ ] Normal, non focal  [ ] Focal findings:    PSYCHIATRIC  [ ]Alert and appropriate  [ ] Sedated	 [ ]Agitated    :  Mcbride: [ ] Yes, if yes: Date of Placement:                   [  ] No    LINES: Central Lines [ ] Yes, if yes: Date of Placement                                     [  ] No    HOSPITAL MEDICATIONS:  MEDICATIONS  (STANDING):  albuterol/ipratropium for Nebulization 3 milliLiter(s) Nebulizer every 8 hours  amLODIPine   Tablet 10 milliGRAM(s) Oral daily  artificial  tears Solution 2 Drop(s) Both EYES four times a day  ascorbic acid 500 milliGRAM(s) Oral daily  Biotene Dry Mouth Oral Rinse 5 milliLiter(s) Swish and Spit every 6 hours  calcium carbonate    500 mG (Tums) Chewable 1 Tablet(s) Chew every 12 hours  chlorhexidine 0.12% Liquid 15 milliLiter(s) Oral Mucosa every 12 hours  chlorhexidine 4% Liquid 1 Application(s) Topical <User Schedule>  dextrose 50% Injectable 12.5 Gram(s) IV Push once  dextrose 50% Injectable 25 Gram(s) IV Push once  escitalopram 10 milliGRAM(s) Oral <User Schedule>  fentaNYL   Patch  25 MICROgram(s)/Hr 1 Patch Transdermal every 72 hours  gabapentin Solution 100 milliGRAM(s) Oral <User Schedule>  gabapentin Solution 250 milliGRAM(s) Enteral Tube at bedtime  glucagon  Injectable 1 milliGRAM(s) IntraMuscular once  hemorrhoidal Ointment 1 Application(s) Rectal daily  hemorrhoidal Ointment      insulin glargine Injectable (LANTUS) 19 Unit(s) SubCutaneous <User Schedule>  insulin lispro (ADMELOG) corrective regimen sliding scale   SubCutaneous every 6 hours  insulin regular  human recombinant 9 Unit(s) SubCutaneous <User Schedule>  insulin regular  human recombinant 18 Unit(s) SubCutaneous <User Schedule>  insulin regular  human recombinant 38 Unit(s) SubCutaneous <User Schedule>  levothyroxine 125 MICROGram(s) Oral <User Schedule>  lidocaine   4% Patch 1 Patch Transdermal daily  lidocaine   4% Patch 1 Patch Transdermal daily  lidocaine 2% Viscous 5 milliLiter(s) Mucosal two times a day  melatonin 1 milliGRAM(s) Oral at bedtime  melatonin 5 milliGRAM(s) Oral at bedtime  multivitamin/minerals/iron Oral Solution (CENTRUM) 15 milliLiter(s) Oral daily  nystatin Powder 1 Application(s) Topical every 8 hours  pantoprazole   Suspension 40 milliGRAM(s) Enteral Tube <User Schedule>  petrolatum Ophthalmic Ointment 1 Application(s) Both EYES four times a day  predniSONE  Solution 5 milliGRAM(s) Oral <User Schedule>  QUEtiapine 12.5 milliGRAM(s) Oral <User Schedule>  silver nitrate Applicator 1 Application(s) Topical once  simethicone 80 milliGRAM(s) Chew four times a day  zinc oxide 40% Paste 1 Application(s) Topical daily    MEDICATIONS  (PRN):  acetaminophen   Oral Liquid .. 1000 milliGRAM(s) Oral every 6 hours PRN Temp greater or equal to 38.5C (101.3F)  benzocaine 20% Spray 1 Spray(s) Topical four times a day PRN Cough  diclofenac sodium 1% Gel 2 Gram(s) Topical four times a day PRN pain  diphenhydrAMINE Elixir 25 milliGRAM(s) Oral every 6 hours PRN Rash and/or Itching  guaifenesin/dextromethorphan Oral Liquid 10 milliLiter(s) Oral every 6 hours PRN Cough  lidocaine   4% Patch 2 Patch Transdermal daily PRN shoulder pain  oxyCODONE    Solution 5 milliGRAM(s) Oral every 6 hours PRN Moderate Pain (4 - 6)  sodium chloride 0.65% Nasal 1 Spray(s) Both Nostrils every 6 hours PRN Nasal Congestion      LABS:                        9.4    15.18 )-----------( 230      ( 14 Jan 2024 14:20 )             32.3     01-14    133<L>  |  94<L>  |  17  ----------------------------<  239<H>  4.0   |  24  |  <0.30<L>    Ca    9.7      14 Jan 2024 14:20  Phos  4.3     01-14  Mg     2.0     01-14    TPro  8.3  /  Alb  3.7  /  TBili  0.5  /  DBili  x   /  AST  39  /  ALT  38  /  AlkPhos  61  01-14    PT/INR - ( 14 Jan 2024 14:20 )   PT: 12.7 sec;   INR: 1.22 ratio         PTT - ( 14 Jan 2024 14:20 )  PTT:28.5 sec  Urinalysis Basic - ( 14 Jan 2024 14:20 )    Color: x / Appearance: x / SG: x / pH: x  Gluc: 239 mg/dL / Ketone: x  / Bili: x / Urobili: x   Blood: x / Protein: x / Nitrite: x   Leuk Esterase: x / RBC: x / WBC x   Sq Epi: x / Non Sq Epi: x / Bacteria: x    Arterial Blood Gas:  01-14 @ 14:17  7.36/52/108/29/98.8/3.2  ABG lactate: --      Mode: AC/ CMV (Assist Control/ Continuous Mandatory Ventilation)  RR (machine): 12  TV (machine): 300  FiO2: 40  PEEP: 5  PS:   ITime: 1  MAP: 10  PIP: 24   Patient is a 74y old  Female who presents with a chief complaint of dyspnea, (13 Jan 2024 07:28)    HPI:  72 y/o F with PMHx of T2DM, HTN, HLD, anemia, hypothyroidism, RA, fibromyalgia, remote cerebral aneurysm repair, acute hypercapnic respiratory failure now with tracheostomy, PEG tube, and bedbound (trach change 8/18, PEG change 8/14), sacral pressure ulcer, BIBEMS from home for 6 day hx of bleeding from the tracheostomy and PEG tube with associated abdominal pain. Patient still having regular bowel movements, last one occurred prior to ED arrival. Was seen outpatient on 10/26 by critical care Dr. Zaki Gottlieb and apparently had cultures sent at that time. Patient also noted to have chronic sacral decubitous ulcer. Family endorsed one episode of fever, though was not febrile on evaluation. Daughter noted patient was recently experiencing auditory and visual hallucinations (seeing animals that were not there & hearing a "bomb" going off outside her home), though patient not actively hallucinating on evaluation.  ED course notable for CT neck imaging showing no evidence of active bleeding around the tracheostomy site, no hematomas, and no drainable collection around the tracheostomy site. CT abdomen/pelvis notable for gastrostomy tube localized to the stomach with mild thickening noted along the tract; a sacral decubitus ulcer extending to the coccyx with osseous remodeling, possibly representing osteomyelitis; and bibasilar patchy opacities with volume loss in the lungs, representing atelectasis vs infection. Patient admitted for respiratory support, wound care of tracheostomy and PEG, and management of presumed sacral osteomyelitis.   (28 Oct 2023 04:18)    Interval Events: No untoward events overnight    REVIEW OF SYSTEMS:  [ ] Positive  [ x] All other systems negative  [ ] Unable to assess ROS because ________    Vital Signs Last 24 Hrs  T(C): 36.8 (01-15-24 @ 04:55), Max: 37 (01-14-24 @ 12:57)  T(F): 98.3 (01-15-24 @ 04:55), Max: 98.6 (01-14-24 @ 12:57)  HR: 75 (01-15-24 @ 05:18) (69 - 93)  BP: 118/74 (01-15-24 @ 04:55) (118/74 - 144/68)  RR: 15 (01-15-24 @ 04:55) (15 - 16)  SpO2: 100% (01-15-24 @ 05:18) (100% - 100%)    PHYSICAL EXAM:  HEENT:   [X]Tracheostomy: #8 distal XLT Shiley  [X]Pupils equal  [ ]No oral lesions  [X] Abnormal: missing dentition; +teeth caries; +loose bottom teeth.   small tongue laceration    SKIN  [ ]No Rash  [ ] Abnormal  [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    CARDIAC  [X]Regular  [ ]Abnormal    PULMONARY  [ ]Bilateral Clear Breath Sounds  [ ]Normal Excursion  [X]Abnormal - BL Coarse BS    GI  [X]PEG      [X] +BS		              [X]Soft, nondistended, nontender	  [ ]Abnormal    MUSCULOSKELETAL                                   [X]Bedbound                 [X] Abnormal: Pain with passive ROM of right shoulder, tender   [ ]Ambulatory/OOB to chair                           EXTREMITIES                                         [X]Normal  [ ]Edema                           NEUROLOGIC  [ ] Normal, non focal  [X] Focal findings: opens eyes spontaneously, follows commands on all 4 extremities    PSYCHIATRIC  [X]Alert and appropriate  [ ] Sedated	 [ ]Agitated    :  Mcbride: [ ] Yes, if yes: Date of Placement:                   [X] No    LINES: Central Lines [ ] Yes, if yes: Date of Placement                                     [X] No    HOSPITAL MEDICATIONS:  MEDICATIONS  (STANDING):  albuterol/ipratropium for Nebulization 3 milliLiter(s) Nebulizer every 8 hours  amLODIPine   Tablet 10 milliGRAM(s) Oral daily  artificial  tears Solution 2 Drop(s) Both EYES four times a day  ascorbic acid 500 milliGRAM(s) Oral daily  Biotene Dry Mouth Oral Rinse 5 milliLiter(s) Swish and Spit every 6 hours  calcium carbonate    500 mG (Tums) Chewable 1 Tablet(s) Chew every 12 hours  chlorhexidine 0.12% Liquid 15 milliLiter(s) Oral Mucosa every 12 hours  chlorhexidine 4% Liquid 1 Application(s) Topical <User Schedule>  dextrose 50% Injectable 12.5 Gram(s) IV Push once  dextrose 50% Injectable 25 Gram(s) IV Push once  escitalopram 10 milliGRAM(s) Oral <User Schedule>  fentaNYL   Patch  25 MICROgram(s)/Hr 1 Patch Transdermal every 72 hours  gabapentin Solution 100 milliGRAM(s) Oral <User Schedule>  gabapentin Solution 250 milliGRAM(s) Enteral Tube at bedtime  glucagon  Injectable 1 milliGRAM(s) IntraMuscular once  hemorrhoidal Ointment 1 Application(s) Rectal daily  hemorrhoidal Ointment      insulin glargine Injectable (LANTUS) 19 Unit(s) SubCutaneous <User Schedule>  insulin lispro (ADMELOG) corrective regimen sliding scale   SubCutaneous every 6 hours  insulin regular  human recombinant 9 Unit(s) SubCutaneous <User Schedule>  insulin regular  human recombinant 18 Unit(s) SubCutaneous <User Schedule>  insulin regular  human recombinant 38 Unit(s) SubCutaneous <User Schedule>  levothyroxine 125 MICROGram(s) Oral <User Schedule>  lidocaine   4% Patch 1 Patch Transdermal daily  lidocaine   4% Patch 1 Patch Transdermal daily  lidocaine 2% Viscous 5 milliLiter(s) Mucosal two times a day  melatonin 1 milliGRAM(s) Oral at bedtime  melatonin 5 milliGRAM(s) Oral at bedtime  multivitamin/minerals/iron Oral Solution (CENTRUM) 15 milliLiter(s) Oral daily  nystatin Powder 1 Application(s) Topical every 8 hours  pantoprazole   Suspension 40 milliGRAM(s) Enteral Tube <User Schedule>  petrolatum Ophthalmic Ointment 1 Application(s) Both EYES four times a day  predniSONE  Solution 5 milliGRAM(s) Oral <User Schedule>  QUEtiapine 12.5 milliGRAM(s) Oral <User Schedule>  silver nitrate Applicator 1 Application(s) Topical once  simethicone 80 milliGRAM(s) Chew four times a day  zinc oxide 40% Paste 1 Application(s) Topical daily    MEDICATIONS  (PRN):  acetaminophen   Oral Liquid .. 1000 milliGRAM(s) Oral every 6 hours PRN Temp greater or equal to 38.5C (101.3F)  benzocaine 20% Spray 1 Spray(s) Topical four times a day PRN Cough  diclofenac sodium 1% Gel 2 Gram(s) Topical four times a day PRN pain  diphenhydrAMINE Elixir 25 milliGRAM(s) Oral every 6 hours PRN Rash and/or Itching  guaifenesin/dextromethorphan Oral Liquid 10 milliLiter(s) Oral every 6 hours PRN Cough  lidocaine   4% Patch 2 Patch Transdermal daily PRN shoulder pain  oxyCODONE    Solution 5 milliGRAM(s) Oral every 6 hours PRN Moderate Pain (4 - 6)  sodium chloride 0.65% Nasal 1 Spray(s) Both Nostrils every 6 hours PRN Nasal Congestion      LABS:                        9.4    15.18 )-----------( 230      ( 14 Jan 2024 14:20 )             32.3     01-14    133<L>  |  94<L>  |  17  ----------------------------<  239<H>  4.0   |  24  |  <0.30<L>    Ca    9.7      14 Jan 2024 14:20  Phos  4.3     01-14  Mg     2.0     01-14    TPro  8.3  /  Alb  3.7  /  TBili  0.5  /  DBili  x   /  AST  39  /  ALT  38  /  AlkPhos  61  01-14    PT/INR - ( 14 Jan 2024 14:20 )   PT: 12.7 sec;   INR: 1.22 ratio         PTT - ( 14 Jan 2024 14:20 )  PTT:28.5 sec  Urinalysis Basic - ( 14 Jan 2024 14:20 )    Color: x / Appearance: x / SG: x / pH: x  Gluc: 239 mg/dL / Ketone: x  / Bili: x / Urobili: x   Blood: x / Protein: x / Nitrite: x   Leuk Esterase: x / RBC: x / WBC x   Sq Epi: x / Non Sq Epi: x / Bacteria: x    Arterial Blood Gas:  01-14 @ 14:17  7.36/52/108/29/98.8/3.2  ABG lactate: --      Mode: AC/ CMV (Assist Control/ Continuous Mandatory Ventilation)  RR (machine): 12  TV (machine): 300  FiO2: 40  PEEP: 5  PS:   ITime: 1  MAP: 10  PIP: 24   Patient is a 74y old  Female who presents with a chief complaint of dyspnea, (13 Jan 2024 07:28)    HPI:  74 y/o F with PMHx of T2DM, HTN, HLD, anemia, hypothyroidism, RA, fibromyalgia, remote cerebral aneurysm repair, acute hypercapnic respiratory failure now with tracheostomy, PEG tube, and bedbound (trach change 8/18, PEG change 8/14), sacral pressure ulcer, BIBEMS from home for 6 day hx of bleeding from the tracheostomy and PEG tube with associated abdominal pain. Patient still having regular bowel movements, last one occurred prior to ED arrival. Was seen outpatient on 10/26 by critical care Dr. Zaki Gottlieb and apparently had cultures sent at that time. Patient also noted to have chronic sacral decubitous ulcer. Family endorsed one episode of fever, though was not febrile on evaluation. Daughter noted patient was recently experiencing auditory and visual hallucinations (seeing animals that were not there & hearing a "bomb" going off outside her home), though patient not actively hallucinating on evaluation.  ED course notable for CT neck imaging showing no evidence of active bleeding around the tracheostomy site, no hematomas, and no drainable collection around the tracheostomy site. CT abdomen/pelvis notable for gastrostomy tube localized to the stomach with mild thickening noted along the tract; a sacral decubitus ulcer extending to the coccyx with osseous remodeling, possibly representing osteomyelitis; and bibasilar patchy opacities with volume loss in the lungs, representing atelectasis vs infection. Patient admitted for respiratory support, wound care of tracheostomy and PEG, and management of presumed sacral osteomyelitis.   (28 Oct 2023 04:18)    Interval Events: No untoward events overnight    REVIEW OF SYSTEMS:  [ ] Positive  [ x] All other systems negative  [ ] Unable to assess ROS because ________    Vital Signs Last 24 Hrs  T(C): 36.8 (01-15-24 @ 04:55), Max: 37 (01-14-24 @ 12:57)  T(F): 98.3 (01-15-24 @ 04:55), Max: 98.6 (01-14-24 @ 12:57)  HR: 75 (01-15-24 @ 05:18) (69 - 93)  BP: 118/74 (01-15-24 @ 04:55) (118/74 - 144/68)  RR: 15 (01-15-24 @ 04:55) (15 - 16)  SpO2: 100% (01-15-24 @ 05:18) (100% - 100%)    PHYSICAL EXAM:  HEENT:   [X]Tracheostomy: #8 distal XLT Shiley  [X]Pupils equal  [ ]No oral lesions  [X] Abnormal: missing dentition; +teeth caries; +loose bottom teeth.   small tongue laceration    SKIN  [ ]No Rash  [ ] Abnormal  [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    CARDIAC  [X]Regular  [ ]Abnormal    PULMONARY  [ ]Bilateral Clear Breath Sounds  [ ]Normal Excursion  [X]Abnormal - BL Coarse BS    GI  [X]PEG      [X] +BS		              [X]Soft, nondistended, nontender	  [ ]Abnormal    MUSCULOSKELETAL                                   [X]Bedbound                 [X] Abnormal: Pain with passive ROM of right shoulder, tender   [ ]Ambulatory/OOB to chair                           EXTREMITIES                                         [X]Normal  [ ]Edema                           NEUROLOGIC  [ ] Normal, non focal  [X] Focal findings: opens eyes spontaneously, follows commands on all 4 extremities    PSYCHIATRIC  [X]Alert and appropriate  [ ] Sedated	 [ ]Agitated    :  Mcbride: [ ] Yes, if yes: Date of Placement:                   [X] No    LINES: Central Lines [ ] Yes, if yes: Date of Placement                                     [X] No    HOSPITAL MEDICATIONS:  MEDICATIONS  (STANDING):  albuterol/ipratropium for Nebulization 3 milliLiter(s) Nebulizer every 8 hours  amLODIPine   Tablet 10 milliGRAM(s) Oral daily  artificial  tears Solution 2 Drop(s) Both EYES four times a day  ascorbic acid 500 milliGRAM(s) Oral daily  Biotene Dry Mouth Oral Rinse 5 milliLiter(s) Swish and Spit every 6 hours  calcium carbonate    500 mG (Tums) Chewable 1 Tablet(s) Chew every 12 hours  chlorhexidine 0.12% Liquid 15 milliLiter(s) Oral Mucosa every 12 hours  chlorhexidine 4% Liquid 1 Application(s) Topical <User Schedule>  dextrose 50% Injectable 12.5 Gram(s) IV Push once  dextrose 50% Injectable 25 Gram(s) IV Push once  escitalopram 10 milliGRAM(s) Oral <User Schedule>  fentaNYL   Patch  25 MICROgram(s)/Hr 1 Patch Transdermal every 72 hours  gabapentin Solution 100 milliGRAM(s) Oral <User Schedule>  gabapentin Solution 250 milliGRAM(s) Enteral Tube at bedtime  glucagon  Injectable 1 milliGRAM(s) IntraMuscular once  hemorrhoidal Ointment 1 Application(s) Rectal daily  hemorrhoidal Ointment      insulin glargine Injectable (LANTUS) 19 Unit(s) SubCutaneous <User Schedule>  insulin lispro (ADMELOG) corrective regimen sliding scale   SubCutaneous every 6 hours  insulin regular  human recombinant 9 Unit(s) SubCutaneous <User Schedule>  insulin regular  human recombinant 18 Unit(s) SubCutaneous <User Schedule>  insulin regular  human recombinant 38 Unit(s) SubCutaneous <User Schedule>  levothyroxine 125 MICROGram(s) Oral <User Schedule>  lidocaine   4% Patch 1 Patch Transdermal daily  lidocaine   4% Patch 1 Patch Transdermal daily  lidocaine 2% Viscous 5 milliLiter(s) Mucosal two times a day  melatonin 1 milliGRAM(s) Oral at bedtime  melatonin 5 milliGRAM(s) Oral at bedtime  multivitamin/minerals/iron Oral Solution (CENTRUM) 15 milliLiter(s) Oral daily  nystatin Powder 1 Application(s) Topical every 8 hours  pantoprazole   Suspension 40 milliGRAM(s) Enteral Tube <User Schedule>  petrolatum Ophthalmic Ointment 1 Application(s) Both EYES four times a day  predniSONE  Solution 5 milliGRAM(s) Oral <User Schedule>  QUEtiapine 12.5 milliGRAM(s) Oral <User Schedule>  silver nitrate Applicator 1 Application(s) Topical once  simethicone 80 milliGRAM(s) Chew four times a day  zinc oxide 40% Paste 1 Application(s) Topical daily    MEDICATIONS  (PRN):  acetaminophen   Oral Liquid .. 1000 milliGRAM(s) Oral every 6 hours PRN Temp greater or equal to 38.5C (101.3F)  benzocaine 20% Spray 1 Spray(s) Topical four times a day PRN Cough  diclofenac sodium 1% Gel 2 Gram(s) Topical four times a day PRN pain  diphenhydrAMINE Elixir 25 milliGRAM(s) Oral every 6 hours PRN Rash and/or Itching  guaifenesin/dextromethorphan Oral Liquid 10 milliLiter(s) Oral every 6 hours PRN Cough  lidocaine   4% Patch 2 Patch Transdermal daily PRN shoulder pain  oxyCODONE    Solution 5 milliGRAM(s) Oral every 6 hours PRN Moderate Pain (4 - 6)  sodium chloride 0.65% Nasal 1 Spray(s) Both Nostrils every 6 hours PRN Nasal Congestion      LABS:                        9.4    15.18 )-----------( 230      ( 14 Jan 2024 14:20 )             32.3     01-14    133<L>  |  94<L>  |  17  ----------------------------<  239<H>  4.0   |  24  |  <0.30<L>    Ca    9.7      14 Jan 2024 14:20  Phos  4.3     01-14  Mg     2.0     01-14    TPro  8.3  /  Alb  3.7  /  TBili  0.5  /  DBili  x   /  AST  39  /  ALT  38  /  AlkPhos  61  01-14    PT/INR - ( 14 Jan 2024 14:20 )   PT: 12.7 sec;   INR: 1.22 ratio         PTT - ( 14 Jan 2024 14:20 )  PTT:28.5 sec  Urinalysis Basic - ( 14 Jan 2024 14:20 )    Color: x / Appearance: x / SG: x / pH: x  Gluc: 239 mg/dL / Ketone: x  / Bili: x / Urobili: x   Blood: x / Protein: x / Nitrite: x   Leuk Esterase: x / RBC: x / WBC x   Sq Epi: x / Non Sq Epi: x / Bacteria: x    Arterial Blood Gas:  01-14 @ 14:17  7.36/52/108/29/98.8/3.2  ABG lactate: --      Mode: AC/ CMV (Assist Control/ Continuous Mandatory Ventilation)  RR (machine): 12  TV (machine): 300  FiO2: 40  PEEP: 5  PS:   ITime: 1  MAP: 10  PIP: 24   Patient is a 74y old  Female who presents with a chief complaint of dyspnea, (13 Jan 2024 07:28)    HPI:  74 y/o F with PMHx of T2DM, HTN, HLD, anemia, hypothyroidism, RA, fibromyalgia, remote cerebral aneurysm repair, acute hypercapnic respiratory failure now with tracheostomy, PEG tube, and bedbound (trach change 8/18, PEG change 8/14), sacral pressure ulcer, BIBEMS from home for 6 day hx of bleeding from the tracheostomy and PEG tube with associated abdominal pain. Patient still having regular bowel movements, last one occurred prior to ED arrival. Was seen outpatient on 10/26 by critical care Dr. Zaki Gottlieb and apparently had cultures sent at that time. Patient also noted to have chronic sacral decubitous ulcer. Family endorsed one episode of fever, though was not febrile on evaluation. Daughter noted patient was recently experiencing auditory and visual hallucinations (seeing animals that were not there & hearing a "bomb" going off outside her home), though patient not actively hallucinating on evaluation.  ED course notable for CT neck imaging showing no evidence of active bleeding around the tracheostomy site, no hematomas, and no drainable collection around the tracheostomy site. CT abdomen/pelvis notable for gastrostomy tube localized to the stomach with mild thickening noted along the tract; a sacral decubitus ulcer extending to the coccyx with osseous remodeling, possibly representing osteomyelitis; and bibasilar patchy opacities with volume loss in the lungs, representing atelectasis vs infection. Patient admitted for respiratory support, wound care of tracheostomy and PEG, and management of presumed sacral osteomyelitis.   (28 Oct 2023 04:18)    Interval Events: No untoward events overnight    REVIEW OF SYSTEMS:  [ ] Positive  [ x] All other systems negative, right shoulder with some pain upon passive rom  [ ] Unable to assess ROS because ________    Vital Signs Last 24 Hrs  T(C): 36.8 (01-15-24 @ 04:55), Max: 37 (01-14-24 @ 12:57)  T(F): 98.3 (01-15-24 @ 04:55), Max: 98.6 (01-14-24 @ 12:57)  HR: 75 (01-15-24 @ 05:18) (69 - 93)  BP: 118/74 (01-15-24 @ 04:55) (118/74 - 144/68)  RR: 15 (01-15-24 @ 04:55) (15 - 16)  SpO2: 100% (01-15-24 @ 05:18) (100% - 100%)    PHYSICAL EXAM:  HEENT:   [X]Tracheostomy: #8 distal XLT Shiley  [X]Pupils equal  [ ]No oral lesions  [X] Abnormal: missing dentition; +teeth caries; +loose bottom teeth.   small tongue laceration    SKIN  [ ]No Rash  [ ] Abnormal  [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    CARDIAC  [X]Regular  [ ]Abnormal    PULMONARY  [ ]Bilateral Clear Breath Sounds  [ ]Normal Excursion  [X]Abnormal - BL Coarse BS    GI  [X]PEG      [X] +BS		              [X]Soft, nondistended, nontender	  [ ]Abnormal    MUSCULOSKELETAL                                   [X]Bedbound                 [X] Abnormal: Pain with passive ROM of right shoulder, tender   [ ]Ambulatory/OOB to chair                           EXTREMITIES                                         [X]Normal  [ ]Edema                           NEUROLOGIC  [ ] Normal, non focal  [X] Focal findings: opens eyes spontaneously, follows commands on all 4 extremities    PSYCHIATRIC  [X]Alert and appropriate  [ ] Sedated	 [ ]Agitated    :  Mcbride: [ ] Yes, if yes: Date of Placement:                   [X] No    LINES: Central Lines [ ] Yes, if yes: Date of Placement                                     [X] No    HOSPITAL MEDICATIONS:  MEDICATIONS  (STANDING):  albuterol/ipratropium for Nebulization 3 milliLiter(s) Nebulizer every 8 hours  amLODIPine   Tablet 10 milliGRAM(s) Oral daily  artificial  tears Solution 2 Drop(s) Both EYES four times a day  ascorbic acid 500 milliGRAM(s) Oral daily  Biotene Dry Mouth Oral Rinse 5 milliLiter(s) Swish and Spit every 6 hours  calcium carbonate    500 mG (Tums) Chewable 1 Tablet(s) Chew every 12 hours  chlorhexidine 0.12% Liquid 15 milliLiter(s) Oral Mucosa every 12 hours  chlorhexidine 4% Liquid 1 Application(s) Topical <User Schedule>  dextrose 50% Injectable 12.5 Gram(s) IV Push once  dextrose 50% Injectable 25 Gram(s) IV Push once  escitalopram 10 milliGRAM(s) Oral <User Schedule>  fentaNYL   Patch  25 MICROgram(s)/Hr 1 Patch Transdermal every 72 hours  gabapentin Solution 100 milliGRAM(s) Oral <User Schedule>  gabapentin Solution 250 milliGRAM(s) Enteral Tube at bedtime  glucagon  Injectable 1 milliGRAM(s) IntraMuscular once  hemorrhoidal Ointment 1 Application(s) Rectal daily  hemorrhoidal Ointment      insulin glargine Injectable (LANTUS) 19 Unit(s) SubCutaneous <User Schedule>  insulin lispro (ADMELOG) corrective regimen sliding scale   SubCutaneous every 6 hours  insulin regular  human recombinant 9 Unit(s) SubCutaneous <User Schedule>  insulin regular  human recombinant 18 Unit(s) SubCutaneous <User Schedule>  insulin regular  human recombinant 38 Unit(s) SubCutaneous <User Schedule>  levothyroxine 125 MICROGram(s) Oral <User Schedule>  lidocaine   4% Patch 1 Patch Transdermal daily  lidocaine   4% Patch 1 Patch Transdermal daily  lidocaine 2% Viscous 5 milliLiter(s) Mucosal two times a day  melatonin 1 milliGRAM(s) Oral at bedtime  melatonin 5 milliGRAM(s) Oral at bedtime  multivitamin/minerals/iron Oral Solution (CENTRUM) 15 milliLiter(s) Oral daily  nystatin Powder 1 Application(s) Topical every 8 hours  pantoprazole   Suspension 40 milliGRAM(s) Enteral Tube <User Schedule>  petrolatum Ophthalmic Ointment 1 Application(s) Both EYES four times a day  predniSONE  Solution 5 milliGRAM(s) Oral <User Schedule>  QUEtiapine 12.5 milliGRAM(s) Oral <User Schedule>  silver nitrate Applicator 1 Application(s) Topical once  simethicone 80 milliGRAM(s) Chew four times a day  zinc oxide 40% Paste 1 Application(s) Topical daily    MEDICATIONS  (PRN):  acetaminophen   Oral Liquid .. 1000 milliGRAM(s) Oral every 6 hours PRN Temp greater or equal to 38.5C (101.3F)  benzocaine 20% Spray 1 Spray(s) Topical four times a day PRN Cough  diclofenac sodium 1% Gel 2 Gram(s) Topical four times a day PRN pain  diphenhydrAMINE Elixir 25 milliGRAM(s) Oral every 6 hours PRN Rash and/or Itching  guaifenesin/dextromethorphan Oral Liquid 10 milliLiter(s) Oral every 6 hours PRN Cough  lidocaine   4% Patch 2 Patch Transdermal daily PRN shoulder pain  oxyCODONE    Solution 5 milliGRAM(s) Oral every 6 hours PRN Moderate Pain (4 - 6)  sodium chloride 0.65% Nasal 1 Spray(s) Both Nostrils every 6 hours PRN Nasal Congestion      LABS:                        9.4    15.18 )-----------( 230      ( 14 Jan 2024 14:20 )             32.3     01-14    133<L>  |  94<L>  |  17  ----------------------------<  239<H>  4.0   |  24  |  <0.30<L>    Ca    9.7      14 Jan 2024 14:20  Phos  4.3     01-14  Mg     2.0     01-14    TPro  8.3  /  Alb  3.7  /  TBili  0.5  /  DBili  x   /  AST  39  /  ALT  38  /  AlkPhos  61  01-14    PT/INR - ( 14 Jan 2024 14:20 )   PT: 12.7 sec;   INR: 1.22 ratio         PTT - ( 14 Jan 2024 14:20 )  PTT:28.5 sec  Urinalysis Basic - ( 14 Jan 2024 14:20 )    Color: x / Appearance: x / SG: x / pH: x  Gluc: 239 mg/dL / Ketone: x  / Bili: x / Urobili: x   Blood: x / Protein: x / Nitrite: x   Leuk Esterase: x / RBC: x / WBC x   Sq Epi: x / Non Sq Epi: x / Bacteria: x    Arterial Blood Gas:  01-14 @ 14:17  7.36/52/108/29/98.8/3.2  ABG lactate: --      Mode: AC/ CMV (Assist Control/ Continuous Mandatory Ventilation)  RR (machine): 12  TV (machine): 300  FiO2: 40  PEEP: 5  PS:   ITime: 1  MAP: 10  PIP: 24   Patient is a 74y old  Female who presents with a chief complaint of dyspnea, (13 Jan 2024 07:28)    HPI:  72 y/o F with PMHx of T2DM, HTN, HLD, anemia, hypothyroidism, RA, fibromyalgia, remote cerebral aneurysm repair, acute hypercapnic respiratory failure now with tracheostomy, PEG tube, and bedbound (trach change 8/18, PEG change 8/14), sacral pressure ulcer, BIBEMS from home for 6 day hx of bleeding from the tracheostomy and PEG tube with associated abdominal pain. Patient still having regular bowel movements, last one occurred prior to ED arrival. Was seen outpatient on 10/26 by critical care Dr. Zaki Gottlieb and apparently had cultures sent at that time. Patient also noted to have chronic sacral decubitous ulcer. Family endorsed one episode of fever, though was not febrile on evaluation. Daughter noted patient was recently experiencing auditory and visual hallucinations (seeing animals that were not there & hearing a "bomb" going off outside her home), though patient not actively hallucinating on evaluation.  ED course notable for CT neck imaging showing no evidence of active bleeding around the tracheostomy site, no hematomas, and no drainable collection around the tracheostomy site. CT abdomen/pelvis notable for gastrostomy tube localized to the stomach with mild thickening noted along the tract; a sacral decubitus ulcer extending to the coccyx with osseous remodeling, possibly representing osteomyelitis; and bibasilar patchy opacities with volume loss in the lungs, representing atelectasis vs infection. Patient admitted for respiratory support, wound care of tracheostomy and PEG, and management of presumed sacral osteomyelitis.   (28 Oct 2023 04:18)    Interval Events: No untoward events overnight    REVIEW OF SYSTEMS:  [ ] Positive  [ x] All other systems negative, right shoulder with some pain upon passive rom  [ ] Unable to assess ROS because ________    Vital Signs Last 24 Hrs  T(C): 36.8 (01-15-24 @ 04:55), Max: 37 (01-14-24 @ 12:57)  T(F): 98.3 (01-15-24 @ 04:55), Max: 98.6 (01-14-24 @ 12:57)  HR: 75 (01-15-24 @ 05:18) (69 - 93)  BP: 118/74 (01-15-24 @ 04:55) (118/74 - 144/68)  RR: 15 (01-15-24 @ 04:55) (15 - 16)  SpO2: 100% (01-15-24 @ 05:18) (100% - 100%)    PHYSICAL EXAM:  HEENT:   [X]Tracheostomy: #8 distal XLT Shiley  [X]Pupils equal  [ ]No oral lesions  [X] Abnormal: missing dentition; +teeth caries; +loose bottom teeth.   small tongue laceration    SKIN  [ ]No Rash  [ ] Abnormal  [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    CARDIAC  [X]Regular  [ ]Abnormal    PULMONARY  [ ]Bilateral Clear Breath Sounds  [ ]Normal Excursion  [X]Abnormal - BL Coarse BS    GI  [X]PEG      [X] +BS		              [X]Soft, nondistended, nontender	  [ ]Abnormal    MUSCULOSKELETAL                                   [X]Bedbound                 [X] Abnormal: Pain with passive ROM of right shoulder, tender   [ ]Ambulatory/OOB to chair                           EXTREMITIES                                         [X]Normal  [ ]Edema                           NEUROLOGIC  [ ] Normal, non focal  [X] Focal findings: opens eyes spontaneously, follows commands on all 4 extremities    PSYCHIATRIC  [X]Alert and appropriate  [ ] Sedated	 [ ]Agitated    :  Mcbride: [ ] Yes, if yes: Date of Placement:                   [X] No    LINES: Central Lines [ ] Yes, if yes: Date of Placement                                     [X] No    HOSPITAL MEDICATIONS:  MEDICATIONS  (STANDING):  albuterol/ipratropium for Nebulization 3 milliLiter(s) Nebulizer every 8 hours  amLODIPine   Tablet 10 milliGRAM(s) Oral daily  artificial  tears Solution 2 Drop(s) Both EYES four times a day  ascorbic acid 500 milliGRAM(s) Oral daily  Biotene Dry Mouth Oral Rinse 5 milliLiter(s) Swish and Spit every 6 hours  calcium carbonate    500 mG (Tums) Chewable 1 Tablet(s) Chew every 12 hours  chlorhexidine 0.12% Liquid 15 milliLiter(s) Oral Mucosa every 12 hours  chlorhexidine 4% Liquid 1 Application(s) Topical <User Schedule>  dextrose 50% Injectable 12.5 Gram(s) IV Push once  dextrose 50% Injectable 25 Gram(s) IV Push once  escitalopram 10 milliGRAM(s) Oral <User Schedule>  fentaNYL   Patch  25 MICROgram(s)/Hr 1 Patch Transdermal every 72 hours  gabapentin Solution 100 milliGRAM(s) Oral <User Schedule>  gabapentin Solution 250 milliGRAM(s) Enteral Tube at bedtime  glucagon  Injectable 1 milliGRAM(s) IntraMuscular once  hemorrhoidal Ointment 1 Application(s) Rectal daily  hemorrhoidal Ointment      insulin glargine Injectable (LANTUS) 19 Unit(s) SubCutaneous <User Schedule>  insulin lispro (ADMELOG) corrective regimen sliding scale   SubCutaneous every 6 hours  insulin regular  human recombinant 9 Unit(s) SubCutaneous <User Schedule>  insulin regular  human recombinant 18 Unit(s) SubCutaneous <User Schedule>  insulin regular  human recombinant 38 Unit(s) SubCutaneous <User Schedule>  levothyroxine 125 MICROGram(s) Oral <User Schedule>  lidocaine   4% Patch 1 Patch Transdermal daily  lidocaine   4% Patch 1 Patch Transdermal daily  lidocaine 2% Viscous 5 milliLiter(s) Mucosal two times a day  melatonin 1 milliGRAM(s) Oral at bedtime  melatonin 5 milliGRAM(s) Oral at bedtime  multivitamin/minerals/iron Oral Solution (CENTRUM) 15 milliLiter(s) Oral daily  nystatin Powder 1 Application(s) Topical every 8 hours  pantoprazole   Suspension 40 milliGRAM(s) Enteral Tube <User Schedule>  petrolatum Ophthalmic Ointment 1 Application(s) Both EYES four times a day  predniSONE  Solution 5 milliGRAM(s) Oral <User Schedule>  QUEtiapine 12.5 milliGRAM(s) Oral <User Schedule>  silver nitrate Applicator 1 Application(s) Topical once  simethicone 80 milliGRAM(s) Chew four times a day  zinc oxide 40% Paste 1 Application(s) Topical daily    MEDICATIONS  (PRN):  acetaminophen   Oral Liquid .. 1000 milliGRAM(s) Oral every 6 hours PRN Temp greater or equal to 38.5C (101.3F)  benzocaine 20% Spray 1 Spray(s) Topical four times a day PRN Cough  diclofenac sodium 1% Gel 2 Gram(s) Topical four times a day PRN pain  diphenhydrAMINE Elixir 25 milliGRAM(s) Oral every 6 hours PRN Rash and/or Itching  guaifenesin/dextromethorphan Oral Liquid 10 milliLiter(s) Oral every 6 hours PRN Cough  lidocaine   4% Patch 2 Patch Transdermal daily PRN shoulder pain  oxyCODONE    Solution 5 milliGRAM(s) Oral every 6 hours PRN Moderate Pain (4 - 6)  sodium chloride 0.65% Nasal 1 Spray(s) Both Nostrils every 6 hours PRN Nasal Congestion      LABS:                        9.4    15.18 )-----------( 230      ( 14 Jan 2024 14:20 )             32.3     01-14    133<L>  |  94<L>  |  17  ----------------------------<  239<H>  4.0   |  24  |  <0.30<L>    Ca    9.7      14 Jan 2024 14:20  Phos  4.3     01-14  Mg     2.0     01-14    TPro  8.3  /  Alb  3.7  /  TBili  0.5  /  DBili  x   /  AST  39  /  ALT  38  /  AlkPhos  61  01-14    PT/INR - ( 14 Jan 2024 14:20 )   PT: 12.7 sec;   INR: 1.22 ratio         PTT - ( 14 Jan 2024 14:20 )  PTT:28.5 sec  Urinalysis Basic - ( 14 Jan 2024 14:20 )    Color: x / Appearance: x / SG: x / pH: x  Gluc: 239 mg/dL / Ketone: x  / Bili: x / Urobili: x   Blood: x / Protein: x / Nitrite: x   Leuk Esterase: x / RBC: x / WBC x   Sq Epi: x / Non Sq Epi: x / Bacteria: x    Arterial Blood Gas:  01-14 @ 14:17  7.36/52/108/29/98.8/3.2  ABG lactate: --      Mode: AC/ CMV (Assist Control/ Continuous Mandatory Ventilation)  RR (machine): 12  TV (machine): 300  FiO2: 40  PEEP: 5  PS:   ITime: 1  MAP: 10  PIP: 24

## 2024-01-15 NOTE — PROGRESS NOTE ADULT - ASSESSMENT
72 y/o F with PMHx of T2DM, HTN, HLD, anemia, hypothyroidism, RA, fibromyalgia, remote cerebral aneurysm repair, acute hypercapnic respiratory failure now with tracheostomy, PEG tube, and bedbound (trach change 8/18, PEG change 8/14), sacral pressure ulcer, BIBEMS from home for 6 day hx of bleeding from the tracheostomy and PEG tube with associated abdominal pain, recent history of auditory and visual hallucinations, and with chronic sacral decubitus ulcer. Exam notable for mild abdominal distention with LLQ and RLQ tenderness, tracheostomy in place with no obvious bleeding, PEG tube with scant amount of dried blood, at baseline neurologic status. Imaging showing no active bleeding around tracheostomy site, however was notable for mild thickening along PEG tube tract, sacral ulcer extending to the coccyx suggestive of possible osteomyelitis, and bibasilar patchy lung opacities with volume loss. Patient admitted for respiratory support, wound care of tracheostomy and PEG, and management of sacral osteomyelitis. No further Trach and peg site bleeding noted since admission.  Pelvic MRI imaging also suggestive of Acute OM. Patient placed on long-term antibiotics with Infectious disease guidance.  Additionally treated with a 7d course of Cefepime due to PSA and Serratia tracheitis on 11/8-->11/15 and then resumed cipro/keflex.  Hospital course c/b Delirium for which Seroquel was added and home Lexapro continued. Tracheostomy changed to #9 Cuffed [place] by ENT 11/3.  Despite trach change patient remained with persistent air leak.  On 11/19, the trach was again changed due to leak and loss of volumes on vent.  Trach was changed to #8 distal cuffed [place].  Patient seen by Dermatology for back lesions.  One diagnosed as a seborrheic keratitis and the other a dermatofibroma.  Intermittent abdominal pain throughout hospital course, at times relieved with gas/BM, w/u negative, seen by GI - no recs.  12/10 pt completed cipro and keflex for osteo treatment.  12/13 moderate amounts of secretions w/ blood - tranexamic acid neb given with notable improvement.  12/18 with blood tinged secretion again - TXA 250mg neb x2 doses.  12/19 spiked fever to 102 - pancultured, negative w/u.  12/19 silver Nitrate applied to PEG site by GI for leakage.  Pt has since remained medically stable.  1/7-1/8 Generalized pain, starting in forearm and BL shoulders in which an xray was performed and was negative for acute fractures/dislocation, doppler negative for DVT.  Will continue with pain management as needed.  Receives continuous pulmonary toileting w/ duoneb, chest PT, and suctioning PRN.      1/14: No acute events over night.  Orthopedic and pain management consult requested to evaluate persistent atraumatic right shoulder pain.  MRI of right  shoulder completedconsidered per orthopedic evaluation.  During MRI vent circit6 disconnected O2 SAT's dropped quickly to 40%, patient was unresponsive, dusky ambued and slowly became more responsive.  CT head done. No untoward issues.    74 y/o F with PMHx of T2DM, HTN, HLD, anemia, hypothyroidism, RA, fibromyalgia, remote cerebral aneurysm repair, acute hypercapnic respiratory failure now with tracheostomy, PEG tube, and bedbound (trach change 8/18, PEG change 8/14), sacral pressure ulcer, BIBEMS from home for 6 day hx of bleeding from the tracheostomy and PEG tube with associated abdominal pain, recent history of auditory and visual hallucinations, and with chronic sacral decubitus ulcer. Exam notable for mild abdominal distention with LLQ and RLQ tenderness, tracheostomy in place with no obvious bleeding, PEG tube with scant amount of dried blood, at baseline neurologic status. Imaging showing no active bleeding around tracheostomy site, however was notable for mild thickening along PEG tube tract, sacral ulcer extending to the coccyx suggestive of possible osteomyelitis, and bibasilar patchy lung opacities with volume loss. Patient admitted for respiratory support, wound care of tracheostomy and PEG, and management of sacral osteomyelitis. No further Trach and peg site bleeding noted since admission.  Pelvic MRI imaging also suggestive of Acute OM. Patient placed on long-term antibiotics with Infectious disease guidance.  Additionally treated with a 7d course of Cefepime due to PSA and Serratia tracheitis on 11/8-->11/15 and then resumed cipro/keflex.  Hospital course c/b Delirium for which Seroquel was added and home Lexapro continued. Tracheostomy changed to #9 Cuffed [place] by ENT 11/3.  Despite trach change patient remained with persistent air leak.  On 11/19, the trach was again changed due to leak and loss of volumes on vent.  Trach was changed to #8 distal cuffed [place].  Patient seen by Dermatology for back lesions.  One diagnosed as a seborrheic keratitis and the other a dermatofibroma.  Intermittent abdominal pain throughout hospital course, at times relieved with gas/BM, w/u negative, seen by GI - no recs.  12/10 pt completed cipro and keflex for osteo treatment.  12/13 moderate amounts of secretions w/ blood - tranexamic acid neb given with notable improvement.  12/18 with blood tinged secretion again - TXA 250mg neb x2 doses.  12/19 spiked fever to 102 - pancultured, negative w/u.  12/19 silver Nitrate applied to PEG site by GI for leakage.  Pt has since remained medically stable.  1/7-1/8 Generalized pain, starting in forearm and BL shoulders in which an xray was performed and was negative for acute fractures/dislocation, doppler negative for DVT.  Will continue with pain management as needed.  Receives continuous pulmonary toileting w/ duoneb, chest PT, and suctioning PRN.      1/14: No acute events over night.  Orthopedic and pain management consult requested to evaluate persistent atraumatic right shoulder pain.  MRI of right  shoulder completedconsidered per orthopedic evaluation.  During MRI vent circit6 disconnected O2 SAT's dropped quickly to 40%, patient was unresponsive, dusky ambued and slowly became more responsive.  CT head done. No untoward issues.    74 y/o F with PMHx of T2DM, HTN, HLD, anemia, hypothyroidism, RA, fibromyalgia, remote cerebral aneurysm repair, acute hypercapnic respiratory failure now with tracheostomy, PEG tube, and bedbound (trach change 8/18, PEG change 8/14), sacral pressure ulcer, BIBEMS from home for 6 day hx of bleeding from the tracheostomy and PEG tube with associated abdominal pain, recent history of auditory and visual hallucinations, and with chronic sacral decubitus ulcer. Exam notable for mild abdominal distention with LLQ and RLQ tenderness, tracheostomy in place with no obvious bleeding, PEG tube with scant amount of dried blood, at baseline neurologic status. Imaging showing no active bleeding around tracheostomy site, however was notable for mild thickening along PEG tube tract, sacral ulcer extending to the coccyx suggestive of possible osteomyelitis, and bibasilar patchy lung opacities with volume loss. Patient admitted for respiratory support, wound care of tracheostomy and PEG, and management of sacral osteomyelitis. No further Trach and peg site bleeding noted since admission.  Pelvic MRI imaging also suggestive of Acute OM. Patient placed on long-term antibiotics with Infectious disease guidance.  Additionally treated with a 7d course of Cefepime due to PSA and Serratia tracheitis on 11/8-->11/15 and then resumed cipro/keflex.  Hospital course c/b Delirium for which Seroquel was added and home Lexapro continued. Tracheostomy changed to #9 Cuffed [place] by ENT 11/3.  Despite trach change patient remained with persistent air leak.  On 11/19, the trach was again changed due to leak and loss of volumes on vent.  Trach was changed to #8 distal cuffed [place].  Patient seen by Dermatology for back lesions.  One diagnosed as a seborrheic keratitis and the other a dermatofibroma.  Intermittent abdominal pain throughout hospital course, at times relieved with gas/BM, w/u negative, seen by GI - no recs.  12/10 pt completed cipro and keflex for osteo treatment.  12/13 moderate amounts of secretions w/ blood - tranexamic acid neb given with notable improvement.  12/18 with blood tinged secretion again - TXA 250mg neb x2 doses.  12/19 spiked fever to 102 - pancultured, negative w/u.  12/19 silver Nitrate applied to PEG site by GI for leakage.  Pt has since remained medically stable.  1/7-1/8 Generalized pain, starting in forearm and BL shoulders in which an xray was performed and was negative for acute fractures/dislocation, doppler negative for DVT.  Will continue with pain management as needed.  Receives continuous pulmonary toileting w/ duoneb, chest PT, and suctioning PRN.      1/15: Orthopedics on board for pain in shoulder. During MRI vent circit disconnected O2 SAT's dropped quickly to 40%, patient was unresponsive, dusky ambued and slowly became more responsive.  CT head done. No untoward issues. Patient alert and appropriate  Effusion noted on MRI. Ortho discussed with daughter and aspiration of joint obtained. results pending.    72 y/o F with PMHx of T2DM, HTN, HLD, anemia, hypothyroidism, RA, fibromyalgia, remote cerebral aneurysm repair, acute hypercapnic respiratory failure now with tracheostomy, PEG tube, and bedbound (trach change 8/18, PEG change 8/14), sacral pressure ulcer, BIBEMS from home for 6 day hx of bleeding from the tracheostomy and PEG tube with associated abdominal pain, recent history of auditory and visual hallucinations, and with chronic sacral decubitus ulcer. Exam notable for mild abdominal distention with LLQ and RLQ tenderness, tracheostomy in place with no obvious bleeding, PEG tube with scant amount of dried blood, at baseline neurologic status. Imaging showing no active bleeding around tracheostomy site, however was notable for mild thickening along PEG tube tract, sacral ulcer extending to the coccyx suggestive of possible osteomyelitis, and bibasilar patchy lung opacities with volume loss. Patient admitted for respiratory support, wound care of tracheostomy and PEG, and management of sacral osteomyelitis. No further Trach and peg site bleeding noted since admission.  Pelvic MRI imaging also suggestive of Acute OM. Patient placed on long-term antibiotics with Infectious disease guidance.  Additionally treated with a 7d course of Cefepime due to PSA and Serratia tracheitis on 11/8-->11/15 and then resumed cipro/keflex.  Hospital course c/b Delirium for which Seroquel was added and home Lexapro continued. Tracheostomy changed to #9 Cuffed [place] by ENT 11/3.  Despite trach change patient remained with persistent air leak.  On 11/19, the trach was again changed due to leak and loss of volumes on vent.  Trach was changed to #8 distal cuffed [place].  Patient seen by Dermatology for back lesions.  One diagnosed as a seborrheic keratitis and the other a dermatofibroma.  Intermittent abdominal pain throughout hospital course, at times relieved with gas/BM, w/u negative, seen by GI - no recs.  12/10 pt completed cipro and keflex for osteo treatment.  12/13 moderate amounts of secretions w/ blood - tranexamic acid neb given with notable improvement.  12/18 with blood tinged secretion again - TXA 250mg neb x2 doses.  12/19 spiked fever to 102 - pancultured, negative w/u.  12/19 silver Nitrate applied to PEG site by GI for leakage.  Pt has since remained medically stable.  1/7-1/8 Generalized pain, starting in forearm and BL shoulders in which an xray was performed and was negative for acute fractures/dislocation, doppler negative for DVT.  Will continue with pain management as needed.  Receives continuous pulmonary toileting w/ duoneb, chest PT, and suctioning PRN.      1/15: Orthopedics on board for pain in shoulder. During MRI vent circit disconnected O2 SAT's dropped quickly to 40%, patient was unresponsive, dusky ambued and slowly became more responsive.  CT head done. No untoward issues. Patient alert and appropriate  Effusion noted on MRI. Ortho discussed with daughter and aspiration of joint obtained. results pending.    72 y/o F with PMHx of T2DM, HTN, HLD, anemia, hypothyroidism, RA, fibromyalgia, remote cerebral aneurysm repair, acute hypercapnic respiratory failure now with tracheostomy, PEG tube, and bedbound (trach change 8/18, PEG change 8/14), sacral pressure ulcer, BIBEMS from home for 6 day hx of bleeding from the tracheostomy and PEG tube with associated abdominal pain, recent history of auditory and visual hallucinations, and with chronic sacral decubitus ulcer. Exam notable for mild abdominal distention with LLQ and RLQ tenderness, tracheostomy in place with no obvious bleeding, PEG tube with scant amount of dried blood, at baseline neurologic status. Imaging showing no active bleeding around tracheostomy site, however was notable for mild thickening along PEG tube tract, sacral ulcer extending to the coccyx suggestive of possible osteomyelitis, and bibasilar patchy lung opacities with volume loss. Patient admitted for respiratory support, wound care of tracheostomy and PEG, and management of sacral osteomyelitis. No further Trach and peg site bleeding noted since admission.  Pelvic MRI imaging also suggestive of Acute OM. Patient placed on long-term antibiotics with Infectious disease guidance.  Additionally treated with a 7d course of Cefepime due to PSA and Serratia tracheitis on 11/8-->11/15 and then resumed cipro/keflex.  Hospital course c/b Delirium for which Seroquel was added and home Lexapro continued. Tracheostomy changed to #9 Cuffed [place] by ENT 11/3.  Despite trach change patient remained with persistent air leak.  On 11/19, the trach was again changed due to leak and loss of volumes on vent.  Trach was changed to #8 distal cuffed [place].  Patient seen by Dermatology for back lesions.  One diagnosed as a seborrheic keratitis and the other a dermatofibroma.  Intermittent abdominal pain throughout hospital course, at times relieved with gas/BM, w/u negative, seen by GI - no recs.  12/10 pt completed cipro and keflex for osteo treatment.  12/13 moderate amounts of secretions w/ blood - tranexamic acid neb given with notable improvement.  12/18 with blood tinged secretion again - TXA 250mg neb x2 doses.  12/19 spiked fever to 102 - pancultured, negative w/u.  12/19 silver Nitrate applied to PEG site by GI for leakage.  Pt has since remained medically stable.  1/7-1/8 Generalized pain, starting in forearm and BL shoulders in which an xray was performed and was negative for acute fractures/dislocation, doppler negative for DVT.  Will continue with pain management as needed.  Receives continuous pulmonary toileting w/ duoneb, chest PT, and suctioning PRN.      1/16: Orthopedics on board for pain in shoulder. During MRI vent circit disconnected O2 SAT's dropped quickly to 40%, patient was unresponsive, dusky ambued and slowly became more responsive.  CT head done. No untoward issues. Patient alert and appropriate  Effusion noted on MRI. Ortho discussed with daughter and aspiration of joint obtained. Results showed no growth in culture.  Patient stable for discharge.   74 y/o F with PMHx of T2DM, HTN, HLD, anemia, hypothyroidism, RA, fibromyalgia, remote cerebral aneurysm repair, acute hypercapnic respiratory failure now with tracheostomy, PEG tube, and bedbound (trach change 8/18, PEG change 8/14), sacral pressure ulcer, BIBEMS from home for 6 day hx of bleeding from the tracheostomy and PEG tube with associated abdominal pain, recent history of auditory and visual hallucinations, and with chronic sacral decubitus ulcer. Exam notable for mild abdominal distention with LLQ and RLQ tenderness, tracheostomy in place with no obvious bleeding, PEG tube with scant amount of dried blood, at baseline neurologic status. Imaging showing no active bleeding around tracheostomy site, however was notable for mild thickening along PEG tube tract, sacral ulcer extending to the coccyx suggestive of possible osteomyelitis, and bibasilar patchy lung opacities with volume loss. Patient admitted for respiratory support, wound care of tracheostomy and PEG, and management of sacral osteomyelitis. No further Trach and peg site bleeding noted since admission.  Pelvic MRI imaging also suggestive of Acute OM. Patient placed on long-term antibiotics with Infectious disease guidance.  Additionally treated with a 7d course of Cefepime due to PSA and Serratia tracheitis on 11/8-->11/15 and then resumed cipro/keflex.  Hospital course c/b Delirium for which Seroquel was added and home Lexapro continued. Tracheostomy changed to #9 Cuffed [place] by ENT 11/3.  Despite trach change patient remained with persistent air leak.  On 11/19, the trach was again changed due to leak and loss of volumes on vent.  Trach was changed to #8 distal cuffed [place].  Patient seen by Dermatology for back lesions.  One diagnosed as a seborrheic keratitis and the other a dermatofibroma.  Intermittent abdominal pain throughout hospital course, at times relieved with gas/BM, w/u negative, seen by GI - no recs.  12/10 pt completed cipro and keflex for osteo treatment.  12/13 moderate amounts of secretions w/ blood - tranexamic acid neb given with notable improvement.  12/18 with blood tinged secretion again - TXA 250mg neb x2 doses.  12/19 spiked fever to 102 - pancultured, negative w/u.  12/19 silver Nitrate applied to PEG site by GI for leakage.  Pt has since remained medically stable.  1/7-1/8 Generalized pain, starting in forearm and BL shoulders in which an xray was performed and was negative for acute fractures/dislocation, doppler negative for DVT.  Will continue with pain management as needed.  Receives continuous pulmonary toileting w/ duoneb, chest PT, and suctioning PRN.      1/16: Orthopedics on board for pain in shoulder. During MRI vent circit disconnected O2 SAT's dropped quickly to 40%, patient was unresponsive, dusky ambued and slowly became more responsive.  CT head done. No untoward issues. Patient alert and appropriate  Effusion noted on MRI. Ortho discussed with daughter and aspiration of joint obtained. Results showed no growth in culture.  Patient stable for discharge.

## 2024-01-15 NOTE — PROGRESS NOTE ADULT - NS ATTEND AMEND GEN_ALL_CORE FT
----------73F with a h/o DM, HTN, HLD, anemia, hypothyroidism, RA, fibromyalgia, remote cerebral aneurysm with repair, hypercapnic respiratory failure s/p tracheostomy/PEG, bedbound, sacral pressure ulcer. Admitted w/ bleeding from tracheostomy and PEG w/ associated abdominal pain, recent hx of auditory/visual hallucinations. Imaging showed mild thickening along PEG tube tract and sacral ulcer extending to coccyx suggestive of acute osteomyelitis.    Remains on chronic mech vent, intermittently tolerating PS trials but unable to fully wean and unfortunately she will continue to be ventilator dependent  Tolerating PEG feeds  Sacral OM s/p 6 week course of Cipro and Keflex as per ID - completed 12/10/23. Appreciate wound care follow up.   Right shoulder pain likely from immobility (frozen shoulder) vs. inflammatory. c/w Neurontin and Oxycodone, Lidocaine patch and Voltaren. X-Ray with osteopenia, no fracture. UE dopplers negative. On Prednisone taper. as per  t Orthopedic surgery and pain management --mri shoulder done----  f/u by orthopedics------ -   d/w  and daughter in great details.

## 2024-01-15 NOTE — CHART NOTE - NSCHARTNOTEFT_GEN_A_CORE
Endocrinology consulted for hyperglycemia. Tolerating TFs with BG above goal due to rapid steroid taper for rheumatoid flare until 1/14. Back on Prednisone 5mg daily. Adjusting insulin to BG Goal 100-180mg/dl. No hypoglycemia. Will follow      POC  POCT Blood Glucose.: 80 mg/dL (01-15-24 @ 17:26)  POCT Blood Glucose.: 110 mg/dL (01-15-24 @ 11:49)  POCT Blood Glucose.: 116 mg/dL (01-15-24 @ 05:44)  POCT Blood Glucose.: 106 mg/dL (01-14-24 @ 23:51)  POCT Blood Glucose.: 116 mg/dL (01-14-24 @ 17:41)  POCT Blood Glucose.: 169 mg/dL (01-14-24 @ 12:37)  POCT Blood Glucose.: 124 mg/dL (01-14-24 @ 05:15)  POCT Blood Glucose.: 154 mg/dL (01-14-24 @ 00:02)      MEDICATIONS    insulin glargine Injectable (LANTUS) 19 Unit(s) SubCutaneous <User Schedule>  insulin lispro (ADMELOG) corrective regimen sliding scale   SubCutaneous every 6 hours  insulin regular  human recombinant 18 Unit(s) SubCutaneous <User Schedule>  insulin regular  human recombinant 9 Unit(s) SubCutaneous <User Schedule>  levothyroxine 125 MICROGram(s) Oral <User Schedule>  predniSONE  Solution 5 milliGRAM(s) Oral <User Schedule>    Diet, NPO with Tube Feed:   Tube Feeding Modality: Gastrostomy  [facility] glucose support 1.2  Total Volume for 24 Hours (mL): 1200  Continuous  Starting Tube Feed Rate {mL per Hour}: 60  Increase Tube Feed Rate by (mL): 0  Until Goal Tube Feed Rate (mL per Hour): 60  Tube Feed Duration (in Hours): 20  Tube Feed Start Time: 06:00  Tube Feed Stop Time: 02:00  Free Water Flush  Pump   Rate (mL per Hour): 20   Frequency: Every 2 Hours  Alfred(7 Gm Arginine/7 Gm Glut/1.2 Gm HMB     Qty per Day:  2 (01-10-24 @ 17:19) [Active]    PLAN  - FS BG monitoring [id number] while on TFs/NPO   - C/W lantus dose to 19 units sc qAM   - Adjust Regular insulin 34 units sc at 6am, 17 units sc at 12 noon and 9 units sc at 6pm. PLEASE DO NOT HOLD IF PATIENT IS ON TUBE FEEDS (hold only if TF are stopped). Can decrease/adjust dose if there is a concern for hypoglycemia.   - C/w low dose admelog correction scale every 6 hours  - Will follow and adjust insulin as needed  -Please infor endo if any changes to steroid doses from the taper noted above

## 2024-01-16 LAB
GLUCOSE BLDC GLUCOMTR-MCNC: 124 MG/DL — HIGH (ref 70–99)
GLUCOSE BLDC GLUCOMTR-MCNC: 126 MG/DL — HIGH (ref 70–99)
GLUCOSE BLDC GLUCOMTR-MCNC: 135 MG/DL — HIGH (ref 70–99)
GLUCOSE BLDC GLUCOMTR-MCNC: 156 MG/DL — HIGH (ref 70–99)
GLUCOSE BLDC GLUCOMTR-MCNC: 85 MG/DL — SIGNIFICANT CHANGE UP (ref 70–99)

## 2024-01-16 PROCEDURE — 99232 SBSQ HOSP IP/OBS MODERATE 35: CPT

## 2024-01-16 PROCEDURE — 99233 SBSQ HOSP IP/OBS HIGH 50: CPT

## 2024-01-16 RX ORDER — OXYCODONE HYDROCHLORIDE 5 MG/1
5 TABLET ORAL
Qty: 0 | Refills: 0 | DISCHARGE
Start: 2024-01-16

## 2024-01-16 RX ORDER — ACETAMINOPHEN 500 MG
17.03 TABLET ORAL
Qty: 0 | Refills: 0 | DISCHARGE
Start: 2024-01-16

## 2024-01-16 RX ORDER — QUETIAPINE FUMARATE 200 MG/1
0.5 TABLET, FILM COATED ORAL
Qty: 30 | Refills: 0
Start: 2024-01-16

## 2024-01-16 RX ORDER — DICLOFENAC SODIUM 30 MG/G
1 GEL TOPICAL
Refills: 0 | DISCHARGE

## 2024-01-16 RX ORDER — NYSTATIN CREAM 100000 [USP'U]/G
1 CREAM TOPICAL
Qty: 0 | Refills: 0 | DISCHARGE
Start: 2024-01-16

## 2024-01-16 RX ORDER — LIDOCAINE 4 G/100G
2 CREAM TOPICAL DAILY
Refills: 0 | Status: DISCONTINUED | OUTPATIENT
Start: 2024-01-16 | End: 2024-01-17

## 2024-01-16 RX ORDER — ZINC OXIDE 200 MG/G
1 OINTMENT TOPICAL
Qty: 0 | Refills: 0 | DISCHARGE
Start: 2024-01-16

## 2024-01-16 RX ORDER — ACETYLCYSTEINE 200 MG/ML
600 VIAL (ML) MISCELLANEOUS
Refills: 0 | DISCHARGE

## 2024-01-16 RX ORDER — ASCORBIC ACID 60 MG
1 TABLET,CHEWABLE ORAL
Qty: 0 | Refills: 0 | DISCHARGE
Start: 2024-01-16

## 2024-01-16 RX ORDER — CALCIUM CARBONATE 500(1250)
500 TABLET ORAL
Refills: 0 | DISCHARGE

## 2024-01-16 RX ORDER — LIDOCAINE 4 G/100G
1 CREAM TOPICAL
Qty: 0 | Refills: 0 | DISCHARGE
Start: 2024-01-16

## 2024-01-16 RX ORDER — ESCITALOPRAM OXALATE 10 MG/1
1 TABLET, FILM COATED ORAL
Qty: 30 | Refills: 3
Start: 2024-01-16

## 2024-01-16 RX ORDER — KETOROLAC TROMETHAMINE 30 MG/ML
15 SYRINGE (ML) INJECTION ONCE
Refills: 0 | Status: DISCONTINUED | OUTPATIENT
Start: 2024-01-16 | End: 2024-01-16

## 2024-01-16 RX ORDER — DENOSUMAB 60 MG/ML
0 INJECTION SUBCUTANEOUS
Qty: 0 | Refills: 0 | DISCHARGE

## 2024-01-16 RX ORDER — INSULIN HUMAN 100 [IU]/ML
3 INJECTION, SOLUTION SUBCUTANEOUS ONCE
Refills: 0 | Status: COMPLETED | OUTPATIENT
Start: 2024-01-16 | End: 2024-01-16

## 2024-01-16 RX ORDER — GABAPENTIN 400 MG/1
2 CAPSULE ORAL
Qty: 30 | Refills: 3
Start: 2024-01-16

## 2024-01-16 RX ORDER — BENZOCAINE 10 %
1 GEL (GRAM) MUCOUS MEMBRANE
Qty: 0 | Refills: 0 | DISCHARGE
Start: 2024-01-16

## 2024-01-16 RX ORDER — INSULIN HUMAN 100 [IU]/ML
9 INJECTION, SOLUTION SUBCUTANEOUS
Qty: 0 | Refills: 0 | DISCHARGE
Start: 2024-01-16

## 2024-01-16 RX ORDER — SODIUM CHLORIDE 9 MG/ML
3 INJECTION INTRAMUSCULAR; INTRAVENOUS; SUBCUTANEOUS
Refills: 0 | DISCHARGE

## 2024-01-16 RX ORDER — ACETAMINOPHEN 500 MG
20.3 TABLET ORAL
Refills: 0 | DISCHARGE

## 2024-01-16 RX ORDER — DIPHENHYDRAMINE HCL 50 MG
10 CAPSULE ORAL
Qty: 0 | Refills: 0 | DISCHARGE
Start: 2024-01-16

## 2024-01-16 RX ORDER — MULTIVIT-MIN/FERROUS GLUCONATE 9 MG/15 ML
15 LIQUID (ML) ORAL
Qty: 0 | Refills: 0 | DISCHARGE
Start: 2024-01-16

## 2024-01-16 RX ORDER — ASCORBIC ACID 60 MG
1 TABLET,CHEWABLE ORAL
Refills: 0 | DISCHARGE

## 2024-01-16 RX ORDER — OXYBUTYNIN CHLORIDE 5 MG
1 TABLET ORAL
Refills: 0 | DISCHARGE

## 2024-01-16 RX ORDER — LIDOCAINE 4 G/100G
5 CREAM TOPICAL
Qty: 50 | Refills: 5
Start: 2024-01-16

## 2024-01-16 RX ORDER — SODIUM CHLORIDE 0.65 %
2 AEROSOL, SPRAY (ML) NASAL
Refills: 0 | DISCHARGE

## 2024-01-16 RX ORDER — LIDOCAINE 4 G/100G
2 CREAM TOPICAL
Qty: 0 | Refills: 0 | DISCHARGE
Start: 2024-01-16

## 2024-01-16 RX ORDER — FERROUS SULFATE 325(65) MG
3 TABLET ORAL
Refills: 0 | DISCHARGE

## 2024-01-16 RX ORDER — SALIVA SUBSTITUTE COMB NO.11 351 MG
5 POWDER IN PACKET (EA) MUCOUS MEMBRANE
Qty: 0 | Refills: 0 | DISCHARGE
Start: 2024-01-16

## 2024-01-16 RX ORDER — SODIUM CHLORIDE 0.65 %
1 AEROSOL, SPRAY (ML) NASAL
Qty: 0 | Refills: 0 | DISCHARGE
Start: 2024-01-16

## 2024-01-16 RX ORDER — CALCIUM CARBONATE 500(1250)
1 TABLET ORAL
Qty: 0 | Refills: 0 | DISCHARGE
Start: 2024-01-16

## 2024-01-16 RX ORDER — PHENYLEPHRINE-SHARK LIVER OIL-MINERAL OIL-PETROLATUM RECTAL OINTMENT
1 OINTMENT (GRAM) RECTAL
Qty: 2 | Refills: 0
Start: 2024-01-16

## 2024-01-16 RX ORDER — DICLOFENAC SODIUM 30 MG/G
1 GEL TOPICAL
Qty: 0 | Refills: 0 | DISCHARGE
Start: 2024-01-16

## 2024-01-16 RX ORDER — INSULIN GLARGINE 100 [IU]/ML
19 INJECTION, SOLUTION SUBCUTANEOUS
Qty: 0 | Refills: 0 | DISCHARGE
Start: 2024-01-16

## 2024-01-16 RX ORDER — SIMETHICONE 80 MG/1
1 TABLET, CHEWABLE ORAL
Qty: 120 | Refills: 5
Start: 2024-01-16 | End: 2024-07-13

## 2024-01-16 RX ORDER — SIMETHICONE 80 MG/1
125 TABLET, CHEWABLE ORAL
Qty: 120 | Refills: 5 | DISCHARGE
Start: 2024-01-16 | End: 2024-07-13

## 2024-01-16 RX ORDER — MINERAL OIL, PETROLATUM, PHENYLEPHRINE HCL 2.5; 140; 749 MG/G; MG/G; MG/G
0 OINTMENT TOPICAL
Qty: 30 | Refills: 1
Start: 2024-01-16

## 2024-01-16 RX ORDER — IPRATROPIUM/ALBUTEROL SULFATE 18-103MCG
3 AEROSOL WITH ADAPTER (GRAM) INHALATION
Refills: 0 | DISCHARGE

## 2024-01-16 RX ORDER — PANTOPRAZOLE SODIUM 20 MG/1
40 TABLET, DELAYED RELEASE ORAL
Qty: 0 | Refills: 0 | DISCHARGE
Start: 2024-01-16

## 2024-01-16 RX ADMIN — PHENYLEPHRINE-SHARK LIVER OIL-MINERAL OIL-PETROLATUM RECTAL OINTMENT 1 APPLICATION(S): at 11:50

## 2024-01-16 RX ADMIN — Medication 545 MILLIGRAM(S): at 13:00

## 2024-01-16 RX ADMIN — CHLORHEXIDINE GLUCONATE 15 MILLILITER(S): 213 SOLUTION TOPICAL at 17:36

## 2024-01-16 RX ADMIN — Medication 5 MILLILITER(S): at 18:23

## 2024-01-16 RX ADMIN — Medication 1 TABLET(S): at 17:35

## 2024-01-16 RX ADMIN — Medication 125 MICROGRAM(S): at 05:52

## 2024-01-16 RX ADMIN — Medication 5 MILLIGRAM(S): at 05:52

## 2024-01-16 RX ADMIN — Medication 1 TABLET(S): at 05:49

## 2024-01-16 RX ADMIN — Medication 3 MILLILITER(S): at 17:16

## 2024-01-16 RX ADMIN — LIDOCAINE 2 PATCH: 4 CREAM TOPICAL at 19:00

## 2024-01-16 RX ADMIN — QUETIAPINE FUMARATE 12.5 MILLIGRAM(S): 200 TABLET, FILM COATED ORAL at 17:39

## 2024-01-16 RX ADMIN — SIMETHICONE 80 MILLIGRAM(S): 80 TABLET, CHEWABLE ORAL at 17:35

## 2024-01-16 RX ADMIN — PANTOPRAZOLE SODIUM 40 MILLIGRAM(S): 20 TABLET, DELAYED RELEASE ORAL at 09:05

## 2024-01-16 RX ADMIN — INSULIN GLARGINE 19 UNIT(S): 100 INJECTION, SOLUTION SUBCUTANEOUS at 06:21

## 2024-01-16 RX ADMIN — LIDOCAINE 1 PATCH: 4 CREAM TOPICAL at 19:00

## 2024-01-16 RX ADMIN — INSULIN HUMAN 34 UNIT(S): 100 INJECTION, SOLUTION SUBCUTANEOUS at 06:24

## 2024-01-16 RX ADMIN — NYSTATIN CREAM 1 APPLICATION(S): 100000 CREAM TOPICAL at 05:53

## 2024-01-16 RX ADMIN — Medication 5 MILLILITER(S): at 12:04

## 2024-01-16 RX ADMIN — LIDOCAINE 1 PATCH: 4 CREAM TOPICAL at 00:26

## 2024-01-16 RX ADMIN — Medication 2 DROP(S): at 12:08

## 2024-01-16 RX ADMIN — LIDOCAINE 1 PATCH: 4 CREAM TOPICAL at 12:07

## 2024-01-16 RX ADMIN — GABAPENTIN 100 MILLIGRAM(S): 400 CAPSULE ORAL at 14:44

## 2024-01-16 RX ADMIN — SIMETHICONE 80 MILLIGRAM(S): 80 TABLET, CHEWABLE ORAL at 12:04

## 2024-01-16 RX ADMIN — SIMETHICONE 80 MILLIGRAM(S): 80 TABLET, CHEWABLE ORAL at 23:38

## 2024-01-16 RX ADMIN — Medication 5 MILLIGRAM(S): at 22:34

## 2024-01-16 RX ADMIN — Medication 1 MILLIGRAM(S): at 22:34

## 2024-01-16 RX ADMIN — LIDOCAINE 5 MILLILITER(S): 4 CREAM TOPICAL at 05:50

## 2024-01-16 RX ADMIN — CHLORHEXIDINE GLUCONATE 1 APPLICATION(S): 213 SOLUTION TOPICAL at 05:53

## 2024-01-16 RX ADMIN — Medication 545 MILLIGRAM(S): at 23:40

## 2024-01-16 RX ADMIN — Medication 15 MILLIGRAM(S): at 10:45

## 2024-01-16 RX ADMIN — AMLODIPINE BESYLATE 10 MILLIGRAM(S): 2.5 TABLET ORAL at 05:51

## 2024-01-16 RX ADMIN — Medication 1 APPLICATION(S): at 17:41

## 2024-01-16 RX ADMIN — GABAPENTIN 250 MILLIGRAM(S): 400 CAPSULE ORAL at 22:35

## 2024-01-16 RX ADMIN — LIDOCAINE 5 MILLILITER(S): 4 CREAM TOPICAL at 18:11

## 2024-01-16 RX ADMIN — ESCITALOPRAM OXALATE 10 MILLIGRAM(S): 10 TABLET, FILM COATED ORAL at 09:04

## 2024-01-16 RX ADMIN — INSULIN HUMAN 3 UNIT(S): 100 INJECTION, SOLUTION SUBCUTANEOUS at 18:20

## 2024-01-16 RX ADMIN — Medication 500 MILLIGRAM(S): at 12:03

## 2024-01-16 RX ADMIN — Medication 3 MILLILITER(S): at 05:47

## 2024-01-16 RX ADMIN — Medication 1 APPLICATION(S): at 05:53

## 2024-01-16 RX ADMIN — Medication 15 MILLIGRAM(S): at 10:15

## 2024-01-16 RX ADMIN — Medication 545 MILLIGRAM(S): at 12:03

## 2024-01-16 RX ADMIN — Medication 2 DROP(S): at 05:53

## 2024-01-16 RX ADMIN — ZINC OXIDE 1 APPLICATION(S): 200 OINTMENT TOPICAL at 11:50

## 2024-01-16 RX ADMIN — SIMETHICONE 80 MILLIGRAM(S): 80 TABLET, CHEWABLE ORAL at 05:51

## 2024-01-16 RX ADMIN — Medication 1 APPLICATION(S): at 12:29

## 2024-01-16 RX ADMIN — GABAPENTIN 100 MILLIGRAM(S): 400 CAPSULE ORAL at 05:50

## 2024-01-16 RX ADMIN — Medication 3 MILLILITER(S): at 22:56

## 2024-01-16 RX ADMIN — Medication 0: at 23:39

## 2024-01-16 RX ADMIN — Medication 2: at 12:10

## 2024-01-16 RX ADMIN — INSULIN HUMAN 17 UNIT(S): 100 INJECTION, SOLUTION SUBCUTANEOUS at 12:09

## 2024-01-16 RX ADMIN — CHLORHEXIDINE GLUCONATE 15 MILLILITER(S): 213 SOLUTION TOPICAL at 05:50

## 2024-01-16 RX ADMIN — Medication 5 MILLILITER(S): at 23:40

## 2024-01-16 RX ADMIN — Medication 545 MILLIGRAM(S): at 05:51

## 2024-01-16 RX ADMIN — LIDOCAINE 1 PATCH: 4 CREAM TOPICAL at 12:06

## 2024-01-16 RX ADMIN — Medication 2 DROP(S): at 17:40

## 2024-01-16 RX ADMIN — Medication 1 APPLICATION(S): at 23:39

## 2024-01-16 RX ADMIN — NYSTATIN CREAM 1 APPLICATION(S): 100000 CREAM TOPICAL at 22:34

## 2024-01-16 RX ADMIN — Medication 15 MILLILITER(S): at 12:04

## 2024-01-16 RX ADMIN — LIDOCAINE 2 PATCH: 4 CREAM TOPICAL at 12:15

## 2024-01-16 RX ADMIN — Medication 2 DROP(S): at 23:39

## 2024-01-16 RX ADMIN — Medication 5 MILLILITER(S): at 05:52

## 2024-01-16 RX ADMIN — Medication 545 MILLIGRAM(S): at 17:40

## 2024-01-16 RX ADMIN — NYSTATIN CREAM 1 APPLICATION(S): 100000 CREAM TOPICAL at 14:44

## 2024-01-16 NOTE — PROGRESS NOTE ADULT - SUBJECTIVE AND OBJECTIVE BOX
Orthopedic Surgery Progress Note     S: Patient seen and examined today. No acute events overnight. No changes in pain s/p aspiration.      LABS:                        9.4    15.18 )-----------( 230      ( 14 Jan 2024 14:20 )             32.3     01-14    133<L>  |  94<L>  |  17  ----------------------------<  239<H>  4.0   |  24  |  <0.30<L>    Ca    9.7      14 Jan 2024 14:20  Phos  4.3     01-14  Mg     2.0     01-14    TPro  8.3  /  Alb  3.7  /  TBili  0.5  /  DBili  x   /  AST  39  /  ALT  38  /  AlkPhos  61  01-14    PT/INR - ( 14 Jan 2024 14:20 )   PT: 12.7 sec;   INR: 1.22 ratio         PTT - ( 14 Jan 2024 14:20 )  PTT:28.5 sec  Urinalysis Basic - ( 14 Jan 2024 14:20 )    Color: x / Appearance: x / SG: x / pH: x  Gluc: 239 mg/dL / Ketone: x  / Bili: x / Urobili: x   Blood: x / Protein: x / Nitrite: x   Leuk Esterase: x / RBC: x / WBC x   Sq Epi: x / Non Sq Epi: x / Bacteria: x        VITAL SIGNS:  T(C): 36.2 (01-16-24 @ 04:56), Max: 36.9 (01-15-24 @ 12:20)  HR: 66 (01-16-24 @ 09:45) (66 - 91)  BP: 122/53 (01-16-24 @ 04:56) (105/66 - 132/65)  RR: 15 (01-16-24 @ 09:44) (15 - 17)  SpO2: 100% (01-16-24 @ 09:45) (96% - 100%)      Physical Exam:  Gen: NAD  skin intact, no erythema  Compartments soft and compressible  +Axillary/Musculocutaneous/Radial/Median/AIN/PIN intact  SILT gladis/saph/sp/dp/tib  no pain with micromotion  pain with pROM of shoulder  palpable radial pulses

## 2024-01-16 NOTE — PROGRESS NOTE ADULT - REASON FOR ADMISSION
Bleeding from PEG
dyspepsia
peg tube
Respiratory failure
dysphagia
leaking peg
Bleeding from PEG
PEG bleeding
Bleeding from PEG
Bleeding from PEG
bleeding
Bleeding from tracheostomy and PEG tube
dyspnea,
PEG Bleeding

## 2024-01-16 NOTE — PROGRESS NOTE ADULT - PROBLEM SELECTOR PLAN 1
- FS BG monitoring [id number] while on TFs/NPO   - C/w lantus 19 units sc qAM   - C/w Regular insulin 34 units sc at 6am, c/w 17 units sc at 12 noon and 9 units sc at 6pm. PLEASE DO NOT HOLD IF PATIENT IS ON TUBE FEEDS (hold only if TF are stopped). Can decrease/adjust dose if there is a concern for hypoglycemia.   - C/w low dose admelog correction scale every 6 hours  -Please infor endo if any changes to steroid doses   DISCHARGE:  - Will be determined according to BG/insulin needs/TFs and steroid therapy at time of discharge.   - If discharge within next 24 hours will continue with same insulin types and doses as noted above with the exception of Admelog correction scale.   - Needs telemedicine as outpatient due to bedbound status. Can follow up at endo practice. [address] [place] [address] [address]. Phone . Will send email to contact pt's daughter for f/u care.   -Make sure pt has Rx for all DM supplies and insulin. Needs rx for Novolog regular insulin flex pen> this is new insulin for pt. - FS BG monitoring c4onorx while on TFs/NPO   - C/w lantus 19 units sc qAM   - C/w Regular insulin 34 units sc at 6am, c/w 17 units sc at 12 noon and 9 units sc at 6pm. PLEASE DO NOT HOLD IF PATIENT IS ON TUBE FEEDS (hold only if TF are stopped). Can decrease/adjust dose if there is a concern for hypoglycemia.   - C/w low dose admelog correction scale every 6 hours  -Please infor endo if any changes to steroid doses   DISCHARGE:  - Will be determined according to BG/insulin needs/TFs and steroid therapy at time of discharge.   - If discharge within next 24 hours will continue with same insulin types and doses as noted above with the exception of Admelog correction scale.   - Needs telemedicine as outpatient due to bedbound status. Can follow up at endo practice. [address] [place] [address] [address]. Phone . Will send email to contact pt's daughter for f/u care.   -Make sure pt has Rx for all DM supplies and insulin. Needs rx for Novolog regular insulin flex pen> this is new insulin for pt.

## 2024-01-16 NOTE — PROGRESS NOTE ADULT - SUBJECTIVE AND OBJECTIVE BOX
DIABETES FOLLOW UP NOTE: Saw pt earlier today    Chief Complaint: Endocrine consult requested for management of T2DM    INTERVAL HX: Pt stable, sleeping at time of visit. Daughter/HHA at bedside. Per staff, pt tolerating TFs of [employer] glucose support 1.2 at 60cc/hr from 6am to 2am with BG levels mostly at goal while on present insulin doses. Pt completed steroid taper and is back on Prednisone 5mg /day. No hypoglycemia. Per primary team, pt will be discharged home tomorrow.       Review of Systems:  General: As above  Unable      Allergies    penicillin (Unknown)  heparin (Unknown)  Tagamet (Unknown)  pineapple (Unknown)  walnut (Unknown)  Pecan, Filbert, Hazelnut (Unknown)  Lyrica (Unknown)    Intolerances      MEDICATIONS    insulin glargine Injectable (LANTUS) 19 Unit(s) SubCutaneous <User Schedule>  insulin lispro (ADMELOG) corrective regimen sliding scale   SubCutaneous every 6 hours  insulin regular  human recombinant 9 Unit(s) SubCutaneous <User Schedule>  insulin regular  human recombinant 17 Unit(s) SubCutaneous <User Schedule>  insulin regular  human recombinant 34 Unit(s) SubCutaneous <User Schedule>  levothyroxine 125 MICROGram(s) Oral <User Schedule>  predniSONE  Solution 5 milliGRAM(s) Oral <User Schedule>      PHYSICAL EXAM:  VITALS: T(C): 36.7 (01-16-24 @ 14:00)  T(F): 98 (01-16-24 @ 14:00), Max: 98 (01-16-24 @ 14:00)  HR: 67 (01-16-24 @ 15:51) (66 - 78)  BP: 117/53 (01-16-24 @ 14:00) (105/66 - 132/65)  RR:  (15 - 20)  SpO2:  (99% - 100%)  Wt(kg): --  GENERAL: Female laying in bed in NAD. HHA at bedside  HEENT: Trach in place D&I  Abdomen: Soft, nontender, non distended. PEG in place with TFs going at 60cc/hr at time of visit  Extremities: Warm, mild edema in feet and hands   NEURO: Sleeping at time of visit    LABS:  POCT Blood Glucose.: 156 mg/dL (01-16-24 @ 12:06)  POCT Blood Glucose.: 124 mg/dL (01-16-24 @ 04:54)  POCT Blood Glucose.: 126 mg/dL (01-16-24 @ 00:17)  POCT Blood Glucose.: 80 mg/dL (01-15-24 @ 17:26)  POCT Blood Glucose.: 110 mg/dL (01-15-24 @ 11:49)  POCT Blood Glucose.: 116 mg/dL (01-15-24 @ 05:44)  POCT Blood Glucose.: 106 mg/dL (01-14-24 @ 23:51)  POCT Blood Glucose.: 116 mg/dL (01-14-24 @ 17:41)  POCT Blood Glucose.: 169 mg/dL (01-14-24 @ 12:37)  POCT Blood Glucose.: 124 mg/dL (01-14-24 @ 05:15)  POCT Blood Glucose.: 154 mg/dL (01-14-24 @ 00:02)  POCT Blood Glucose.: 225 mg/dL (01-13-24 @ 17:30)                            9.4    15.18 )-----------( 230      ( 14 Jan 2024 14:20 )             32.3       01-14    133<L>  |  94<L>  |  17  ----------------------------<  239<H>  4.0   |  24  |  <0.30<L>    eGFR: 113    Ca    9.7      01-14  Mg     2.0     01-14  Phos  4.3     01-14    TPro  8.3  /  Alb  3.7  /  TBili  0.5  /  DBili  x   /  AST  39  /  ALT  38  /  AlkPhos  61  01-14      Thyroid Function Tests:  01-09 @ 06:46 TSH 1.66 FreeT4 1.4 T3 -- Anti TPO -- Anti Thyroglobulin Ab -- TSI --      A1C with Estimated Average Glucose Result: 7.5 % (10-28-23 @ 07:18)      Estimated Average Glucose: 169 mg/dL (10-28-23 @ 07:18)        11-25 Chol -- Direct LDL -- LDL calculated -- HDL -- Trig 192<H>               DIABETES FOLLOW UP NOTE: Saw pt earlier today    Chief Complaint: Endocrine consult requested for management of T2DM    INTERVAL HX: Pt stable, sleeping at time of visit. Daughter/HHA at bedside. Per staff, pt tolerating TFs of [employer] glucose support 1.2 at 60cc/hr from 6am to 2am with BG levels mostly at goal while on present insulin doses. Pt completed steroid taper and is back on Prednisone 5mg /day. No hypoglycemia. Per primary team, pt will be discharged home tomorrow.       Review of Systems:  General: As above  Unable      Allergies    penicillin (Unknown)  heparin (Unknown)  Tagamet (Unknown)  pineapple (Unknown)  walnut (Unknown)  Pecan, Filbert, Hazelnut (Unknown)  Lyrica (Unknown)    Intolerances      MEDICATIONS    insulin glargine Injectable (LANTUS) 19 Unit(s) SubCutaneous <User Schedule>  insulin lispro (ADMELOG) corrective regimen sliding scale   SubCutaneous every 6 hours  insulin regular  human recombinant 9 Unit(s) SubCutaneous <User Schedule>  insulin regular  human recombinant 17 Unit(s) SubCutaneous <User Schedule>  insulin regular  human recombinant 34 Unit(s) SubCutaneous <User Schedule>  levothyroxine 125 MICROGram(s) Oral <User Schedule>  predniSONE  Solution 5 milliGRAM(s) Oral <User Schedule>      PHYSICAL EXAM:  VITALS: T(C): 36.7 (01-16-24 @ 14:00)  T(F): 98 (01-16-24 @ 14:00), Max: 98 (01-16-24 @ 14:00)  HR: 67 (01-16-24 @ 15:51) (66 - 78)  BP: 117/53 (01-16-24 @ 14:00) (105/66 - 132/65)  RR:  (15 - 20)  SpO2:  (99% - 100%)  Wt(kg): --  GENERAL: Female laying in bed in NAD. HHA at bedside  HEENT: Trach in place D&I  Abdomen: Soft, nontender, non distended. PEG in place with TFs going at 60cc/hr at time of visit  Extremities: Warm, mild edema in feet and hands   NEURO: Sleeping at time of visit    LABS:  POCT Blood Glucose.: 156 mg/dL (01-16-24 @ 12:06)  POCT Blood Glucose.: 124 mg/dL (01-16-24 @ 04:54)  POCT Blood Glucose.: 126 mg/dL (01-16-24 @ 00:17)  POCT Blood Glucose.: 80 mg/dL (01-15-24 @ 17:26)  POCT Blood Glucose.: 110 mg/dL (01-15-24 @ 11:49)  POCT Blood Glucose.: 116 mg/dL (01-15-24 @ 05:44)  POCT Blood Glucose.: 106 mg/dL (01-14-24 @ 23:51)  POCT Blood Glucose.: 116 mg/dL (01-14-24 @ 17:41)  POCT Blood Glucose.: 169 mg/dL (01-14-24 @ 12:37)  POCT Blood Glucose.: 124 mg/dL (01-14-24 @ 05:15)  POCT Blood Glucose.: 154 mg/dL (01-14-24 @ 00:02)  POCT Blood Glucose.: 225 mg/dL (01-13-24 @ 17:30)                            9.4    15.18 )-----------( 230      ( 14 Jan 2024 14:20 )             32.3       01-14    133<L>  |  94<L>  |  17  ----------------------------<  239<H>  4.0   |  24  |  <0.30<L>    eGFR: 113    Ca    9.7      01-14  Mg     2.0     01-14  Phos  4.3     01-14    TPro  8.3  /  Alb  3.7  /  TBili  0.5  /  DBili  x   /  AST  39  /  ALT  38  /  AlkPhos  61  01-14      Thyroid Function Tests:  01-09 @ 06:46 TSH 1.66 FreeT4 1.4 T3 -- Anti TPO -- Anti Thyroglobulin Ab -- TSI --      A1C with Estimated Average Glucose Result: 7.5 % (10-28-23 @ 07:18)      Estimated Average Glucose: 169 mg/dL (10-28-23 @ 07:18)        11-25 Chol -- Direct LDL -- LDL calculated -- HDL -- Trig 192<H>

## 2024-01-16 NOTE — PROGRESS NOTE ADULT - ASSESSMENT
74 y/o F w/h/o T2DM with unknown control due to anemia of chronic disease skewing A1C levels. On Levemir and Humalog insulin PTA. Unknown DM complications. Also h/o HTN, HLD, anemia, hypothyroidism, RA, fibromyalgia, remote cerebral aneurysm repair, acute hypercapnic respiratory failure now with tracheostomy, PEG tube, and bedbound (trach change 8/18, PEG change 8/14), sacral pressure ulcer, BIBEMS from home for 6 day hx of bleeding from the tracheostomy and PEG tube with associated abdominal pain> now resolved. Endocrinology consulted for hyperglycemia. Tolerating TFs with BG at goal while on present insulin doses and Prednisone 5mg daily. BG Goal 100-180mg/dl. No hypoglycemia.     Spoke to pt's daughter and primary team about discharge plan. They verbalized understanding. Daughter requesting f/u endo care via telemedicine with endo practice.

## 2024-01-16 NOTE — PROGRESS NOTE ADULT - SUBJECTIVE AND OBJECTIVE BOX
Patient is a 74y old  Female who presents with a chief complaint of dyspnea, (13 Jan 2024 07:28)    HPI:  72 y/o F with PMHx of T2DM, HTN, HLD, anemia, hypothyroidism, RA, fibromyalgia, remote cerebral aneurysm repair, acute hypercapnic respiratory failure now with tracheostomy, PEG tube, and bedbound (trach change 8/18, PEG change 8/14), sacral pressure ulcer, BIBEMS from home for 6 day hx of bleeding from the tracheostomy and PEG tube with associated abdominal pain. Patient still having regular bowel movements, last one occurred prior to ED arrival. Was seen outpatient on 10/26 by critical care Dr. Zaki Gottlieb and apparently had cultures sent at that time. Patient also noted to have chronic sacral decubitous ulcer. Family endorsed one episode of fever, though was not febrile on evaluation. Daughter noted patient was recently experiencing auditory and visual hallucinations (seeing animals that were not there & hearing a "bomb" going off outside her home), though patient not actively hallucinating on evaluation.  ED course notable for CT neck imaging showing no evidence of active bleeding around the tracheostomy site, no hematomas, and no drainable collection around the tracheostomy site. CT abdomen/pelvis notable for gastrostomy tube localized to the stomach with mild thickening noted along the tract; a sacral decubitus ulcer extending to the coccyx with osseous remodeling, possibly representing osteomyelitis; and bibasilar patchy opacities with volume loss in the lungs, representing atelectasis vs infection. Patient admitted for respiratory support, wound care of tracheostomy and PEG, and management of presumed sacral osteomyelitis.   (28 Oct 2023 04:18)    Interval Events:    REVIEW OF SYSTEMS:  [ ] Positive  [ ] All other systems negative  [ ] Unable to assess ROS because ________    Vital Signs Last 24 Hrs  T(C): 36.2 (01-16-24 @ 04:56), Max: 36.9 (01-15-24 @ 12:20)  T(F): 97.2 (01-16-24 @ 04:56), Max: 98.5 (01-15-24 @ 12:20)  HR: 66 (01-16-24 @ 05:57) (66 - 91)  BP: 122/53 (01-16-24 @ 04:56) (105/66 - 132/65)  RR: 17 (01-16-24 @ 00:57) (17 - 17)  SpO2: 100% (01-16-24 @ 05:57) (96% - 100%)    PHYSICAL EXAM:  HEENT:   [ ]Tracheostomy:  [ ]Pupils equal  [ ]No oral lesions  [ ]Abnormal    SKIN  [ ] No Rash  [ ] Abnormal  [ ] pressure    CARDIAC  [ ]Regular  [ ]Abnormal    PULMONARY  [ ]Bilateral Clear Breath Sounds  [ ]Normal Excursion  [ ]Abnormal    GI  [ ]PEG      [ ] +BS		              [ ]Soft, nondistended, nontender	  [ ]Abnormal    MUSCULOSKELETAL                                   [ ]Bedbound                 [ ]Abnormal    [ ]Ambulatory/OOB to chair                           EXTREMITIES                                         [ ]Normal  [ ]Edema                           NEUROLOGIC  [ ] Normal, non focal  [ ] Focal findings:    PSYCHIATRIC  [ ]Alert and appropriate  [ ] Sedated	 [ ]Agitated    :  Mcbride: [ ] Yes, if yes: Date of Placement:                   [  ] No    LINES: Central Lines [ ] Yes, if yes: Date of Placement                                     [  ] No    HOSPITAL MEDICATIONS:  MEDICATIONS  (STANDING):  acetaminophen   Oral Liquid .. 545 milliGRAM(s) Oral every 6 hours  albuterol/ipratropium for Nebulization 3 milliLiter(s) Nebulizer every 8 hours  amLODIPine   Tablet 10 milliGRAM(s) Oral daily  artificial  tears Solution 2 Drop(s) Both EYES four times a day  ascorbic acid 500 milliGRAM(s) Oral daily  Biotene Dry Mouth Oral Rinse 5 milliLiter(s) Swish and Spit every 6 hours  calcium carbonate    500 mG (Tums) Chewable 1 Tablet(s) Chew every 12 hours  chlorhexidine 0.12% Liquid 15 milliLiter(s) Oral Mucosa every 12 hours  chlorhexidine 4% Liquid 1 Application(s) Topical <User Schedule>  dextrose 50% Injectable 12.5 Gram(s) IV Push once  dextrose 50% Injectable 25 Gram(s) IV Push once  escitalopram 10 milliGRAM(s) Oral <User Schedule>  fentaNYL   Patch  25 MICROgram(s)/Hr 1 Patch Transdermal every 72 hours  gabapentin Solution 250 milliGRAM(s) Enteral Tube at bedtime  gabapentin Solution 100 milliGRAM(s) Oral <User Schedule>  glucagon  Injectable 1 milliGRAM(s) IntraMuscular once  hemorrhoidal Ointment      hemorrhoidal Ointment 1 Application(s) Rectal daily  insulin glargine Injectable (LANTUS) 19 Unit(s) SubCutaneous <User Schedule>  insulin lispro (ADMELOG) corrective regimen sliding scale   SubCutaneous every 6 hours  insulin regular  human recombinant 9 Unit(s) SubCutaneous <User Schedule>  insulin regular  human recombinant 17 Unit(s) SubCutaneous <User Schedule>  insulin regular  human recombinant 34 Unit(s) SubCutaneous <User Schedule>  levothyroxine 125 MICROGram(s) Oral <User Schedule>  lidocaine   4% Patch 1 Patch Transdermal daily  lidocaine   4% Patch 1 Patch Transdermal daily  lidocaine 2% Viscous 5 milliLiter(s) Mucosal two times a day  melatonin 1 milliGRAM(s) Oral at bedtime  melatonin 5 milliGRAM(s) Oral at bedtime  multivitamin/minerals/iron Oral Solution (CENTRUM) 15 milliLiter(s) Oral daily  nystatin Powder 1 Application(s) Topical every 8 hours  pantoprazole   Suspension 40 milliGRAM(s) Enteral Tube <User Schedule>  petrolatum Ophthalmic Ointment 1 Application(s) Both EYES four times a day  predniSONE  Solution 5 milliGRAM(s) Oral <User Schedule>  QUEtiapine 12.5 milliGRAM(s) Oral <User Schedule>  silver nitrate Applicator 1 Application(s) Topical once  simethicone 80 milliGRAM(s) Chew four times a day  zinc oxide 40% Paste 1 Application(s) Topical daily    MEDICATIONS  (PRN):  benzocaine 20% Spray 1 Spray(s) Topical four times a day PRN Cough  diclofenac sodium 1% Gel 2 Gram(s) Topical four times a day PRN pain  diphenhydrAMINE Elixir 25 milliGRAM(s) Oral every 6 hours PRN Rash and/or Itching  guaifenesin/dextromethorphan Oral Liquid 10 milliLiter(s) Oral every 6 hours PRN Cough  ketorolac   Injectable 15 milliGRAM(s) IV Push every 6 hours PRN Moderate Pain (4 - 6)  lidocaine   4% Patch 2 Patch Transdermal daily PRN shoulder pain  oxyCODONE    Solution 5 milliGRAM(s) Oral every 6 hours PRN Moderate Pain (4 - 6)  sodium chloride 0.65% Nasal 1 Spray(s) Both Nostrils every 6 hours PRN Nasal Congestion      LABS:                        9.4    15.18 )-----------( 230      ( 14 Jan 2024 14:20 )             32.3     01-14    133<L>  |  94<L>  |  17  ----------------------------<  239<H>  4.0   |  24  |  <0.30<L>    Ca    9.7      14 Jan 2024 14:20  Phos  4.3     01-14  Mg     2.0     01-14    TPro  8.3  /  Alb  3.7  /  TBili  0.5  /  DBili  x   /  AST  39  /  ALT  38  /  AlkPhos  61  01-14    PT/INR - ( 14 Jan 2024 14:20 )   PT: 12.7 sec;   INR: 1.22 ratio         PTT - ( 14 Jan 2024 14:20 )  PTT:28.5 sec  Urinalysis Basic - ( 14 Jan 2024 14:20 )    Color: x / Appearance: x / SG: x / pH: x  Gluc: 239 mg/dL / Ketone: x  / Bili: x / Urobili: x   Blood: x / Protein: x / Nitrite: x   Leuk Esterase: x / RBC: x / WBC x   Sq Epi: x / Non Sq Epi: x / Bacteria: x    Mode: AC/ CMV (Assist Control/ Continuous Mandatory Ventilation)  RR (machine): 12  TV (machine): 300  FiO2: 30  PEEP: 5  ITime: 1  MAP: 9  PIP: 26   Patient is a 74y old  Female who presents with a chief complaint of dyspnea, (13 Jan 2024 07:28)    HPI:  74 y/o F with PMHx of T2DM, HTN, HLD, anemia, hypothyroidism, RA, fibromyalgia, remote cerebral aneurysm repair, acute hypercapnic respiratory failure now with tracheostomy, PEG tube, and bedbound (trach change 8/18, PEG change 8/14), sacral pressure ulcer, BIBEMS from home for 6 day hx of bleeding from the tracheostomy and PEG tube with associated abdominal pain. Patient still having regular bowel movements, last one occurred prior to ED arrival. Was seen outpatient on 10/26 by critical care Dr. Zaki Gottlieb and apparently had cultures sent at that time. Patient also noted to have chronic sacral decubitous ulcer. Family endorsed one episode of fever, though was not febrile on evaluation. Daughter noted patient was recently experiencing auditory and visual hallucinations (seeing animals that were not there & hearing a "bomb" going off outside her home), though patient not actively hallucinating on evaluation.  ED course notable for CT neck imaging showing no evidence of active bleeding around the tracheostomy site, no hematomas, and no drainable collection around the tracheostomy site. CT abdomen/pelvis notable for gastrostomy tube localized to the stomach with mild thickening noted along the tract; a sacral decubitus ulcer extending to the coccyx with osseous remodeling, possibly representing osteomyelitis; and bibasilar patchy opacities with volume loss in the lungs, representing atelectasis vs infection. Patient admitted for respiratory support, wound care of tracheostomy and PEG, and management of presumed sacral osteomyelitis.   (28 Oct 2023 04:18)    Interval Events:    REVIEW OF SYSTEMS:  [ ] Positive  [ ] All other systems negative  [ ] Unable to assess ROS because ________    Vital Signs Last 24 Hrs  T(C): 36.2 (01-16-24 @ 04:56), Max: 36.9 (01-15-24 @ 12:20)  T(F): 97.2 (01-16-24 @ 04:56), Max: 98.5 (01-15-24 @ 12:20)  HR: 66 (01-16-24 @ 05:57) (66 - 91)  BP: 122/53 (01-16-24 @ 04:56) (105/66 - 132/65)  RR: 17 (01-16-24 @ 00:57) (17 - 17)  SpO2: 100% (01-16-24 @ 05:57) (96% - 100%)    PHYSICAL EXAM:  HEENT:   [ ]Tracheostomy:  [ ]Pupils equal  [ ]No oral lesions  [ ]Abnormal    SKIN  [ ] No Rash  [ ] Abnormal  [ ] pressure    CARDIAC  [ ]Regular  [ ]Abnormal    PULMONARY  [ ]Bilateral Clear Breath Sounds  [ ]Normal Excursion  [ ]Abnormal    GI  [ ]PEG      [ ] +BS		              [ ]Soft, nondistended, nontender	  [ ]Abnormal    MUSCULOSKELETAL                                   [ ]Bedbound                 [ ]Abnormal    [ ]Ambulatory/OOB to chair                           EXTREMITIES                                         [ ]Normal  [ ]Edema                           NEUROLOGIC  [ ] Normal, non focal  [ ] Focal findings:    PSYCHIATRIC  [ ]Alert and appropriate  [ ] Sedated	 [ ]Agitated    :  Mcbride: [ ] Yes, if yes: Date of Placement:                   [  ] No    LINES: Central Lines [ ] Yes, if yes: Date of Placement                                     [  ] No    HOSPITAL MEDICATIONS:  MEDICATIONS  (STANDING):  acetaminophen   Oral Liquid .. 545 milliGRAM(s) Oral every 6 hours  albuterol/ipratropium for Nebulization 3 milliLiter(s) Nebulizer every 8 hours  amLODIPine   Tablet 10 milliGRAM(s) Oral daily  artificial  tears Solution 2 Drop(s) Both EYES four times a day  ascorbic acid 500 milliGRAM(s) Oral daily  Biotene Dry Mouth Oral Rinse 5 milliLiter(s) Swish and Spit every 6 hours  calcium carbonate    500 mG (Tums) Chewable 1 Tablet(s) Chew every 12 hours  chlorhexidine 0.12% Liquid 15 milliLiter(s) Oral Mucosa every 12 hours  chlorhexidine 4% Liquid 1 Application(s) Topical <User Schedule>  dextrose 50% Injectable 12.5 Gram(s) IV Push once  dextrose 50% Injectable 25 Gram(s) IV Push once  escitalopram 10 milliGRAM(s) Oral <User Schedule>  fentaNYL   Patch  25 MICROgram(s)/Hr 1 Patch Transdermal every 72 hours  gabapentin Solution 250 milliGRAM(s) Enteral Tube at bedtime  gabapentin Solution 100 milliGRAM(s) Oral <User Schedule>  glucagon  Injectable 1 milliGRAM(s) IntraMuscular once  hemorrhoidal Ointment      hemorrhoidal Ointment 1 Application(s) Rectal daily  insulin glargine Injectable (LANTUS) 19 Unit(s) SubCutaneous <User Schedule>  insulin lispro (ADMELOG) corrective regimen sliding scale   SubCutaneous every 6 hours  insulin regular  human recombinant 9 Unit(s) SubCutaneous <User Schedule>  insulin regular  human recombinant 17 Unit(s) SubCutaneous <User Schedule>  insulin regular  human recombinant 34 Unit(s) SubCutaneous <User Schedule>  levothyroxine 125 MICROGram(s) Oral <User Schedule>  lidocaine   4% Patch 1 Patch Transdermal daily  lidocaine   4% Patch 1 Patch Transdermal daily  lidocaine 2% Viscous 5 milliLiter(s) Mucosal two times a day  melatonin 1 milliGRAM(s) Oral at bedtime  melatonin 5 milliGRAM(s) Oral at bedtime  multivitamin/minerals/iron Oral Solution (CENTRUM) 15 milliLiter(s) Oral daily  nystatin Powder 1 Application(s) Topical every 8 hours  pantoprazole   Suspension 40 milliGRAM(s) Enteral Tube <User Schedule>  petrolatum Ophthalmic Ointment 1 Application(s) Both EYES four times a day  predniSONE  Solution 5 milliGRAM(s) Oral <User Schedule>  QUEtiapine 12.5 milliGRAM(s) Oral <User Schedule>  silver nitrate Applicator 1 Application(s) Topical once  simethicone 80 milliGRAM(s) Chew four times a day  zinc oxide 40% Paste 1 Application(s) Topical daily    MEDICATIONS  (PRN):  benzocaine 20% Spray 1 Spray(s) Topical four times a day PRN Cough  diclofenac sodium 1% Gel 2 Gram(s) Topical four times a day PRN pain  diphenhydrAMINE Elixir 25 milliGRAM(s) Oral every 6 hours PRN Rash and/or Itching  guaifenesin/dextromethorphan Oral Liquid 10 milliLiter(s) Oral every 6 hours PRN Cough  ketorolac   Injectable 15 milliGRAM(s) IV Push every 6 hours PRN Moderate Pain (4 - 6)  lidocaine   4% Patch 2 Patch Transdermal daily PRN shoulder pain  oxyCODONE    Solution 5 milliGRAM(s) Oral every 6 hours PRN Moderate Pain (4 - 6)  sodium chloride 0.65% Nasal 1 Spray(s) Both Nostrils every 6 hours PRN Nasal Congestion      LABS:                        9.4    15.18 )-----------( 230      ( 14 Jan 2024 14:20 )             32.3     01-14    133<L>  |  94<L>  |  17  ----------------------------<  239<H>  4.0   |  24  |  <0.30<L>    Ca    9.7      14 Jan 2024 14:20  Phos  4.3     01-14  Mg     2.0     01-14    TPro  8.3  /  Alb  3.7  /  TBili  0.5  /  DBili  x   /  AST  39  /  ALT  38  /  AlkPhos  61  01-14    PT/INR - ( 14 Jan 2024 14:20 )   PT: 12.7 sec;   INR: 1.22 ratio         PTT - ( 14 Jan 2024 14:20 )  PTT:28.5 sec  Urinalysis Basic - ( 14 Jan 2024 14:20 )    Color: x / Appearance: x / SG: x / pH: x  Gluc: 239 mg/dL / Ketone: x  / Bili: x / Urobili: x   Blood: x / Protein: x / Nitrite: x   Leuk Esterase: x / RBC: x / WBC x   Sq Epi: x / Non Sq Epi: x / Bacteria: x    Mode: AC/ CMV (Assist Control/ Continuous Mandatory Ventilation)  RR (machine): 12  TV (machine): 300  FiO2: 30  PEEP: 5  ITime: 1  MAP: 9  PIP: 26   Patient is a 74y old  Female who presents with a chief complaint of dyspnea, (13 Jan 2024 07:28)    HPI:  74 y/o F with PMHx of T2DM, HTN, HLD, anemia, hypothyroidism, RA, fibromyalgia, remote cerebral aneurysm repair, acute hypercapnic respiratory failure now with tracheostomy, PEG tube, and bedbound (trach change 8/18, PEG change 8/14), sacral pressure ulcer, BIBEMS from home for 6 day hx of bleeding from the tracheostomy and PEG tube with associated abdominal pain. Patient still having regular bowel movements, last one occurred prior to ED arrival. Was seen outpatient on 10/26 by critical care Dr. Zaki Gottlieb and apparently had cultures sent at that time. Patient also noted to have chronic sacral decubitous ulcer. Family endorsed one episode of fever, though was not febrile on evaluation. Daughter noted patient was recently experiencing auditory and visual hallucinations (seeing animals that were not there & hearing a "bomb" going off outside her home), though patient not actively hallucinating on evaluation.  ED course notable for CT neck imaging showing no evidence of active bleeding around the tracheostomy site, no hematomas, and no drainable collection around the tracheostomy site. CT abdomen/pelvis notable for gastrostomy tube localized to the stomach with mild thickening noted along the tract; a sacral decubitus ulcer extending to the coccyx with osseous remodeling, possibly representing osteomyelitis; and bibasilar patchy opacities with volume loss in the lungs, representing atelectasis vs infection. Patient admitted for respiratory support, wound care of tracheostomy and PEG, and management of presumed sacral osteomyelitis.   (28 Oct 2023 04:18)    Interval Events: No issues overnight    REVIEW OF SYSTEMS:  [ ] Positive  [x ] All other systems negative  [ ] Unable to assess ROS because ________    Vital Signs Last 24 Hrs  T(C): 36.2 (01-16-24 @ 04:56), Max: 36.9 (01-15-24 @ 12:20)  T(F): 97.2 (01-16-24 @ 04:56), Max: 98.5 (01-15-24 @ 12:20)  HR: 66 (01-16-24 @ 05:57) (66 - 91)  BP: 122/53 (01-16-24 @ 04:56) (105/66 - 132/65)  RR: 17 (01-16-24 @ 00:57) (17 - 17)  SpO2: 100% (01-16-24 @ 05:57) (96% - 100%)    PHYSICAL EXAM:  HEENT:   [X]Tracheostomy: #8 distal XLT Shiley  [X]Pupils equal  [ ]No oral lesions  [X] Abnormal: missing dentition; +teeth caries; +loose bottom teeth.   small tongue laceration    SKIN  [ ]No Rash  [ ] Abnormal  [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    CARDIAC  [X]Regular  [ ]Abnormal    PULMONARY  [ ]Bilateral Clear Breath Sounds  [ ]Normal Excursion  [X]Abnormal - BL Coarse BS    GI  [X]PEG      [X] +BS		              [X]Soft, nondistended, nontender	  [ ]Abnormal    MUSCULOSKELETAL                                   [X]Bedbound                 [X] Abnormal: Pain with passive ROM of right shoulder, tender   [ ]Ambulatory/OOB to chair                           EXTREMITIES                                         [X]Normal  [ ]Edema                           NEUROLOGIC  [ ] Normal, non focal  [X] Focal findings: opens eyes spontaneously, follows commands on all 4 extremities    PSYCHIATRIC  [X]Alert and appropriate  [ ] Sedated	 [ ]Agitated    :  Mcbride: [ ] Yes, if yes: Date of Placement:                   [X] No    LINES: Central Lines [ ] Yes, if yes: Date of Placement                                     [X] No      HOSPITAL MEDICATIONS:  MEDICATIONS  (STANDING):  acetaminophen   Oral Liquid .. 545 milliGRAM(s) Oral every 6 hours  albuterol/ipratropium for Nebulization 3 milliLiter(s) Nebulizer every 8 hours  amLODIPine   Tablet 10 milliGRAM(s) Oral daily  artificial  tears Solution 2 Drop(s) Both EYES four times a day  ascorbic acid 500 milliGRAM(s) Oral daily  Biotene Dry Mouth Oral Rinse 5 milliLiter(s) Swish and Spit every 6 hours  calcium carbonate    500 mG (Tums) Chewable 1 Tablet(s) Chew every 12 hours  chlorhexidine 0.12% Liquid 15 milliLiter(s) Oral Mucosa every 12 hours  chlorhexidine 4% Liquid 1 Application(s) Topical <User Schedule>  dextrose 50% Injectable 12.5 Gram(s) IV Push once  dextrose 50% Injectable 25 Gram(s) IV Push once  escitalopram 10 milliGRAM(s) Oral <User Schedule>  fentaNYL   Patch  25 MICROgram(s)/Hr 1 Patch Transdermal every 72 hours  gabapentin Solution 250 milliGRAM(s) Enteral Tube at bedtime  gabapentin Solution 100 milliGRAM(s) Oral <User Schedule>  glucagon  Injectable 1 milliGRAM(s) IntraMuscular once  hemorrhoidal Ointment      hemorrhoidal Ointment 1 Application(s) Rectal daily  insulin glargine Injectable (LANTUS) 19 Unit(s) SubCutaneous <User Schedule>  insulin lispro (ADMELOG) corrective regimen sliding scale   SubCutaneous every 6 hours  insulin regular  human recombinant 9 Unit(s) SubCutaneous <User Schedule>  insulin regular  human recombinant 17 Unit(s) SubCutaneous <User Schedule>  insulin regular  human recombinant 34 Unit(s) SubCutaneous <User Schedule>  levothyroxine 125 MICROGram(s) Oral <User Schedule>  lidocaine   4% Patch 1 Patch Transdermal daily  lidocaine   4% Patch 1 Patch Transdermal daily  lidocaine 2% Viscous 5 milliLiter(s) Mucosal two times a day  melatonin 1 milliGRAM(s) Oral at bedtime  melatonin 5 milliGRAM(s) Oral at bedtime  multivitamin/minerals/iron Oral Solution (CENTRUM) 15 milliLiter(s) Oral daily  nystatin Powder 1 Application(s) Topical every 8 hours  pantoprazole   Suspension 40 milliGRAM(s) Enteral Tube <User Schedule>  petrolatum Ophthalmic Ointment 1 Application(s) Both EYES four times a day  predniSONE  Solution 5 milliGRAM(s) Oral <User Schedule>  QUEtiapine 12.5 milliGRAM(s) Oral <User Schedule>  silver nitrate Applicator 1 Application(s) Topical once  simethicone 80 milliGRAM(s) Chew four times a day  zinc oxide 40% Paste 1 Application(s) Topical daily    MEDICATIONS  (PRN):  benzocaine 20% Spray 1 Spray(s) Topical four times a day PRN Cough  diclofenac sodium 1% Gel 2 Gram(s) Topical four times a day PRN pain  diphenhydrAMINE Elixir 25 milliGRAM(s) Oral every 6 hours PRN Rash and/or Itching  guaifenesin/dextromethorphan Oral Liquid 10 milliLiter(s) Oral every 6 hours PRN Cough  ketorolac   Injectable 15 milliGRAM(s) IV Push every 6 hours PRN Moderate Pain (4 - 6)  lidocaine   4% Patch 2 Patch Transdermal daily PRN shoulder pain  oxyCODONE    Solution 5 milliGRAM(s) Oral every 6 hours PRN Moderate Pain (4 - 6)  sodium chloride 0.65% Nasal 1 Spray(s) Both Nostrils every 6 hours PRN Nasal Congestion      LABS:                        9.4    15.18 )-----------( 230      ( 14 Jan 2024 14:20 )             32.3     01-14    133<L>  |  94<L>  |  17  ----------------------------<  239<H>  4.0   |  24  |  <0.30<L>    Ca    9.7      14 Jan 2024 14:20  Phos  4.3     01-14  Mg     2.0     01-14    TPro  8.3  /  Alb  3.7  /  TBili  0.5  /  DBili  x   /  AST  39  /  ALT  38  /  AlkPhos  61  01-14    PT/INR - ( 14 Jan 2024 14:20 )   PT: 12.7 sec;   INR: 1.22 ratio         PTT - ( 14 Jan 2024 14:20 )  PTT:28.5 sec  Urinalysis Basic - ( 14 Jan 2024 14:20 )    Color: x / Appearance: x / SG: x / pH: x  Gluc: 239 mg/dL / Ketone: x  / Bili: x / Urobili: x   Blood: x / Protein: x / Nitrite: x   Leuk Esterase: x / RBC: x / WBC x   Sq Epi: x / Non Sq Epi: x / Bacteria: x    Mode: AC/ CMV (Assist Control/ Continuous Mandatory Ventilation)  RR (machine): 12  TV (machine): 300  FiO2: 30  PEEP: 5  ITime: 1  MAP: 9  PIP: 26   Patient is a 74y old  Female who presents with a chief complaint of dyspnea, (13 Jan 2024 07:28)    HPI:  72 y/o F with PMHx of T2DM, HTN, HLD, anemia, hypothyroidism, RA, fibromyalgia, remote cerebral aneurysm repair, acute hypercapnic respiratory failure now with tracheostomy, PEG tube, and bedbound (trach change 8/18, PEG change 8/14), sacral pressure ulcer, BIBEMS from home for 6 day hx of bleeding from the tracheostomy and PEG tube with associated abdominal pain. Patient still having regular bowel movements, last one occurred prior to ED arrival. Was seen outpatient on 10/26 by critical care Dr. Zaki Gottlieb and apparently had cultures sent at that time. Patient also noted to have chronic sacral decubitous ulcer. Family endorsed one episode of fever, though was not febrile on evaluation. Daughter noted patient was recently experiencing auditory and visual hallucinations (seeing animals that were not there & hearing a "bomb" going off outside her home), though patient not actively hallucinating on evaluation.  ED course notable for CT neck imaging showing no evidence of active bleeding around the tracheostomy site, no hematomas, and no drainable collection around the tracheostomy site. CT abdomen/pelvis notable for gastrostomy tube localized to the stomach with mild thickening noted along the tract; a sacral decubitus ulcer extending to the coccyx with osseous remodeling, possibly representing osteomyelitis; and bibasilar patchy opacities with volume loss in the lungs, representing atelectasis vs infection. Patient admitted for respiratory support, wound care of tracheostomy and PEG, and management of presumed sacral osteomyelitis.   (28 Oct 2023 04:18)    Interval Events: No issues overnight    REVIEW OF SYSTEMS:  [ ] Positive  [x ] All other systems negative  [ ] Unable to assess ROS because ________    Vital Signs Last 24 Hrs  T(C): 36.2 (01-16-24 @ 04:56), Max: 36.9 (01-15-24 @ 12:20)  T(F): 97.2 (01-16-24 @ 04:56), Max: 98.5 (01-15-24 @ 12:20)  HR: 66 (01-16-24 @ 05:57) (66 - 91)  BP: 122/53 (01-16-24 @ 04:56) (105/66 - 132/65)  RR: 17 (01-16-24 @ 00:57) (17 - 17)  SpO2: 100% (01-16-24 @ 05:57) (96% - 100%)    PHYSICAL EXAM:  HEENT:   [X]Tracheostomy: #8 distal XLT Shiley  [X]Pupils equal  [ ]No oral lesions  [X] Abnormal: missing dentition; +teeth caries; +loose bottom teeth.   small tongue laceration    SKIN  [ ]No Rash  [ ] Abnormal  [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    CARDIAC  [X]Regular  [ ]Abnormal    PULMONARY  [ ]Bilateral Clear Breath Sounds  [ ]Normal Excursion  [X]Abnormal - BL Coarse BS    GI  [X]PEG      [X] +BS		              [X]Soft, nondistended, nontender	  [ ]Abnormal    MUSCULOSKELETAL                                   [X]Bedbound                 [X] Abnormal: Pain with passive ROM of right shoulder, tender   [ ]Ambulatory/OOB to chair                           EXTREMITIES                                         [X]Normal  [ ]Edema                           NEUROLOGIC  [ ] Normal, non focal  [X] Focal findings: opens eyes spontaneously, follows commands on all 4 extremities    PSYCHIATRIC  [X]Alert and appropriate  [ ] Sedated	 [ ]Agitated    :  [place]: [ ] Yes, if yes: Date of Placement:                   [X] No    LINES: Central Lines [ ] Yes, if yes: Date of Placement                                     [X] No      HOSPITAL MEDICATIONS:  MEDICATIONS  (STANDING):  acetaminophen   Oral Liquid .. 545 milliGRAM(s) Oral every 6 hours  albuterol/ipratropium for Nebulization 3 milliLiter(s) Nebulizer every 8 hours  amLODIPine   Tablet 10 milliGRAM(s) Oral daily  artificial  tears Solution 2 Drop(s) Both EYES four times a day  ascorbic acid 500 milliGRAM(s) Oral daily  Biotene Dry Mouth Oral Rinse 5 milliLiter(s) Swish and Spit every 6 hours  calcium carbonate    500 mG (Tums) Chewable 1 Tablet(s) Chew every 12 hours  chlorhexidine 0.12% Liquid 15 milliLiter(s) Oral Mucosa every 12 hours  chlorhexidine 4% Liquid 1 Application(s) Topical <User Schedule>  dextrose 50% Injectable 12.5 Gram(s) IV Push once  dextrose 50% Injectable 25 Gram(s) IV Push once  escitalopram 10 milliGRAM(s) Oral <User Schedule>  fentaNYL   Patch  25 MICROgram(s)/Hr 1 Patch Transdermal every 72 hours  gabapentin Solution 250 milliGRAM(s) Enteral Tube at bedtime  gabapentin Solution 100 milliGRAM(s) Oral <User Schedule>  glucagon  Injectable 1 milliGRAM(s) IntraMuscular once  hemorrhoidal Ointment      hemorrhoidal Ointment 1 Application(s) Rectal daily  insulin glargine Injectable (LANTUS) 19 Unit(s) SubCutaneous <User Schedule>  insulin lispro (ADMELOG) corrective regimen sliding scale   SubCutaneous every 6 hours  insulin regular  human recombinant 9 Unit(s) SubCutaneous <User Schedule>  insulin regular  human recombinant 17 Unit(s) SubCutaneous <User Schedule>  insulin regular  human recombinant 34 Unit(s) SubCutaneous <User Schedule>  levothyroxine 125 MICROGram(s) Oral <User Schedule>  lidocaine   4% Patch 1 Patch Transdermal daily  lidocaine   4% Patch 1 Patch Transdermal daily  lidocaine 2% Viscous 5 milliLiter(s) Mucosal two times a day  melatonin 1 milliGRAM(s) Oral at bedtime  melatonin 5 milliGRAM(s) Oral at bedtime  multivitamin/minerals/iron Oral Solution (CENTRUM) 15 milliLiter(s) Oral daily  nystatin Powder 1 Application(s) Topical every 8 hours  pantoprazole   Suspension 40 milliGRAM(s) Enteral Tube <User Schedule>  petrolatum Ophthalmic Ointment 1 Application(s) Both EYES four times a day  predniSONE  Solution 5 milliGRAM(s) Oral <User Schedule>  QUEtiapine 12.5 milliGRAM(s) Oral <User Schedule>  silver nitrate Applicator 1 Application(s) Topical once  simethicone 80 milliGRAM(s) Chew four times a day  zinc oxide 40% Paste 1 Application(s) Topical daily    MEDICATIONS  (PRN):  benzocaine 20% Spray 1 Spray(s) Topical four times a day PRN Cough  diclofenac sodium 1% Gel 2 Gram(s) Topical four times a day PRN pain  diphenhydrAMINE Elixir 25 milliGRAM(s) Oral every 6 hours PRN Rash and/or Itching  guaifenesin/dextromethorphan Oral Liquid 10 milliLiter(s) Oral every 6 hours PRN Cough  ketorolac   Injectable 15 milliGRAM(s) IV Push every 6 hours PRN Moderate Pain (4 - 6)  lidocaine   4% Patch 2 Patch Transdermal daily PRN shoulder pain  oxyCODONE    Solution 5 milliGRAM(s) Oral every 6 hours PRN Moderate Pain (4 - 6)  sodium chloride 0.65% Nasal 1 Spray(s) Both Nostrils every 6 hours PRN Nasal Congestion      LABS:                        9.4    15.18 )-----------( 230      ( 14 Jan 2024 14:20 )             32.3     01-14    133<L>  |  94<L>  |  17  ----------------------------<  239<H>  4.0   |  24  |  <0.30<L>    Ca    9.7      14 Jan 2024 14:20  Phos  4.3     01-14  Mg     2.0     01-14    TPro  8.3  /  Alb  3.7  /  TBili  0.5  /  DBili  x   /  AST  39  /  ALT  38  /  AlkPhos  61  01-14    PT/INR - ( 14 Jan 2024 14:20 )   PT: 12.7 sec;   INR: 1.22 ratio         PTT - ( 14 Jan 2024 14:20 )  PTT:28.5 sec  Urinalysis Basic - ( 14 Jan 2024 14:20 )    Color: x / Appearance: x / SG: x / pH: x  Gluc: 239 mg/dL / Ketone: x  / Bili: x / Urobili: x   Blood: x / Protein: x / Nitrite: x   Leuk Esterase: x / RBC: x / WBC x   Sq Epi: x / Non Sq Epi: x / Bacteria: x    Mode: AC/ CMV (Assist Control/ Continuous Mandatory Ventilation)  RR (machine): 12  TV (machine): 300  FiO2: 30  PEEP: 5  ITime: 1  MAP: 9  PIP: 26

## 2024-01-16 NOTE — PROGRESS NOTE ADULT - NS ATTEND AMEND GEN_ALL_CORE FT
73F with a h/o DM, HTN, HLD, anemia, hypothyroidism, RA, fibromyalgia, remote cerebral aneurysm with repair, hypercapnic respiratory failure s/p tracheostomy/PEG, bedbound, sacral pressure ulcer. Admitted w/ bleeding from tracheostomy and PEG w/ associated abdominal pain, recent hx of auditory/visual hallucinations. Imaging showed mild thickening along PEG tube tract and sacral ulcer extending to coccyx suggestive of acute osteomyelitis.    Remains on chronic mech vent, intermittently tolerating PS trials but unable to fully wean and unfortunately she will continue to be ventilator dependent  Tolerating PEG feeds  Sacral OM s/p 6 week course of Cipro and Keflex as per ID - completed 12/10/23. Appreciate wound care follow up.   Right shoulder pain likely from immobility (frozen shoulder) vs. inflammatory. c/w Neurontin and Oxycodone, Lidocaine patch and Voltaren. X-Ray with osteopenia, no fracture. UE dopplers negative. On Prednisone taper. Seen by Orthopedics, mri shoulder done, s/p minimal fluid aspiration, cultures NGTD  Patient is medically stable for discharge  d/w  and daughter at bedside

## 2024-01-16 NOTE — PROGRESS NOTE ADULT - ASSESSMENT

## 2024-01-16 NOTE — PROGRESS NOTE ADULT - ASSESSMENT
74y F with atraumatic right shoulder pain, MRI showing glenohumeral effusion and/or synovial thickening. Differential diagnosis include calcific tendonitis, RA flare, OA flare, GH arthropathy, septic arthritis (though less likely).    -s/p joint aspiration 1/15  -unable to obtain cell count due to insufficient specimen and clotting  -GS: rare PMNs, no organisms  -FU aspiration culture  -Multimodal pain control  -Remainder of care per primary team  -PT/OT   -WBAT RUE  -ortho to sign off, will continue to follow culture data and if growth occurs will discuss treatment options with pt and family  -please reconsult as needed  -discussed with Dr. Cortez who agrees with plan

## 2024-01-16 NOTE — PROGRESS NOTE ADULT - NSPROGADDITIONALINFOA_GEN_ALL_CORE
-Plan discussed with team.  Contact info: [phone] (24/7). pager [phone]  Amion on [place]  Teams on M-T-W-F. Unavailable Thu/Weekends/Holidays  Assessed pt/labs/meds and discussed plan of care with primary team  Adjusting insulin  Discharge plan  Follow up care -Plan discussed with team.  Contact info: [phone] (24/7). pager [phone]  Amion on [place]  Teams on M-T-W-F. Unavailable Thu/Weekends/Holidays  Assessed pt/labs/meds and discussed plan of care with primary team  Adjusting insulin  Discharge plan  Follow up care

## 2024-01-17 ENCOUNTER — NON-APPOINTMENT (OUTPATIENT)
Age: 74
End: 2024-01-17

## 2024-01-17 VITALS — OXYGEN SATURATION: 98 %

## 2024-01-17 LAB
CULTURE RESULTS: SIGNIFICANT CHANGE UP
GLUCOSE BLDC GLUCOMTR-MCNC: 109 MG/DL — HIGH (ref 70–99)
GLUCOSE BLDC GLUCOMTR-MCNC: 116 MG/DL — HIGH (ref 70–99)
SPECIMEN SOURCE: SIGNIFICANT CHANGE UP

## 2024-01-17 PROCEDURE — 70450 CT HEAD/BRAIN W/O DYE: CPT

## 2024-01-17 PROCEDURE — 86902 BLOOD TYPE ANTIGEN DONOR EA: CPT

## 2024-01-17 PROCEDURE — 87040 BLOOD CULTURE FOR BACTERIA: CPT

## 2024-01-17 PROCEDURE — 92597 ORAL SPEECH DEVICE EVAL: CPT

## 2024-01-17 PROCEDURE — 84443 ASSAY THYROID STIM HORMONE: CPT

## 2024-01-17 PROCEDURE — 85014 HEMATOCRIT: CPT

## 2024-01-17 PROCEDURE — 84439 ASSAY OF FREE THYROXINE: CPT

## 2024-01-17 PROCEDURE — 83540 ASSAY OF IRON: CPT

## 2024-01-17 PROCEDURE — 74177 CT ABD & PELVIS W/CONTRAST: CPT | Mod: MG

## 2024-01-17 PROCEDURE — 94640 AIRWAY INHALATION TREATMENT: CPT

## 2024-01-17 PROCEDURE — 96374 THER/PROPH/DIAG INJ IV PUSH: CPT

## 2024-01-17 PROCEDURE — 96375 TX/PRO/DX INJ NEW DRUG ADDON: CPT

## 2024-01-17 PROCEDURE — 85025 COMPLETE CBC W/AUTO DIFF WBC: CPT

## 2024-01-17 PROCEDURE — 84132 ASSAY OF SERUM POTASSIUM: CPT

## 2024-01-17 PROCEDURE — 93005 ELECTROCARDIOGRAM TRACING: CPT

## 2024-01-17 PROCEDURE — 84436 ASSAY OF TOTAL THYROXINE: CPT

## 2024-01-17 PROCEDURE — 97112 NEUROMUSCULAR REEDUCATION: CPT

## 2024-01-17 PROCEDURE — 36430 TRANSFUSION BLD/BLD COMPNT: CPT

## 2024-01-17 PROCEDURE — 85730 THROMBOPLASTIN TIME PARTIAL: CPT

## 2024-01-17 PROCEDURE — 82947 ASSAY GLUCOSE BLOOD QUANT: CPT

## 2024-01-17 PROCEDURE — 80048 BASIC METABOLIC PNL TOTAL CA: CPT

## 2024-01-17 PROCEDURE — 87651 STREP A DNA AMP PROBE: CPT

## 2024-01-17 PROCEDURE — 81003 URINALYSIS AUTO W/O SCOPE: CPT

## 2024-01-17 PROCEDURE — 81001 URINALYSIS AUTO W/SCOPE: CPT

## 2024-01-17 PROCEDURE — 90686 IIV4 VACC NO PRSV 0.5 ML IM: CPT

## 2024-01-17 PROCEDURE — 86803 HEPATITIS C AB TEST: CPT

## 2024-01-17 PROCEDURE — 87640 STAPH A DNA AMP PROBE: CPT

## 2024-01-17 PROCEDURE — 84295 ASSAY OF SERUM SODIUM: CPT

## 2024-01-17 PROCEDURE — 76700 US EXAM ABDOM COMPLETE: CPT

## 2024-01-17 PROCEDURE — 74018 RADEX ABDOMEN 1 VIEW: CPT

## 2024-01-17 PROCEDURE — 86334 IMMUNOFIX E-PHORESIS SERUM: CPT

## 2024-01-17 PROCEDURE — 87798 DETECT AGENT NOS DNA AMP: CPT

## 2024-01-17 PROCEDURE — 86922 COMPATIBILITY TEST ANTIGLOB: CPT

## 2024-01-17 PROCEDURE — 82435 ASSAY OF BLOOD CHLORIDE: CPT

## 2024-01-17 PROCEDURE — 94668 MNPJ CHEST WALL SBSQ: CPT

## 2024-01-17 PROCEDURE — 93971 EXTREMITY STUDY: CPT

## 2024-01-17 PROCEDURE — 86140 C-REACTIVE PROTEIN: CPT

## 2024-01-17 PROCEDURE — 87491 CHLMYD TRACH DNA AMP PROBE: CPT

## 2024-01-17 PROCEDURE — 86880 COOMBS TEST DIRECT: CPT

## 2024-01-17 PROCEDURE — 82746 ASSAY OF FOLIC ACID SERUM: CPT

## 2024-01-17 PROCEDURE — 0225U NFCT DS DNA&RNA 21 SARSCOV2: CPT

## 2024-01-17 PROCEDURE — 86900 BLOOD TYPING SEROLOGIC ABO: CPT

## 2024-01-17 PROCEDURE — 86901 BLOOD TYPING SEROLOGIC RH(D): CPT

## 2024-01-17 PROCEDURE — 99285 EMERGENCY DEPT VISIT HI MDM: CPT | Mod: 25

## 2024-01-17 PROCEDURE — 85652 RBC SED RATE AUTOMATED: CPT

## 2024-01-17 PROCEDURE — 82803 BLOOD GASES ANY COMBINATION: CPT

## 2024-01-17 PROCEDURE — 86860 RBC ANTIBODY ELUTION: CPT

## 2024-01-17 PROCEDURE — G1004: CPT

## 2024-01-17 PROCEDURE — 86022 PLATELET ANTIBODIES: CPT

## 2024-01-17 PROCEDURE — 71045 X-RAY EXAM CHEST 1 VIEW: CPT

## 2024-01-17 PROCEDURE — 86850 RBC ANTIBODY SCREEN: CPT

## 2024-01-17 PROCEDURE — 92507 TX SP LANG VOICE COMM INDIV: CPT

## 2024-01-17 PROCEDURE — 87086 URINE CULTURE/COLONY COUNT: CPT

## 2024-01-17 PROCEDURE — 70498 CT ANGIOGRAPHY NECK: CPT | Mod: MG

## 2024-01-17 PROCEDURE — 84478 ASSAY OF TRIGLYCERIDES: CPT

## 2024-01-17 PROCEDURE — 94799 UNLISTED PULMONARY SVC/PX: CPT

## 2024-01-17 PROCEDURE — 82962 GLUCOSE BLOOD TEST: CPT

## 2024-01-17 PROCEDURE — 85018 HEMOGLOBIN: CPT

## 2024-01-17 PROCEDURE — 87186 SC STD MICRODIL/AGAR DIL: CPT

## 2024-01-17 PROCEDURE — 86905 BLOOD TYPING RBC ANTIGENS: CPT

## 2024-01-17 PROCEDURE — 83735 ASSAY OF MAGNESIUM: CPT

## 2024-01-17 PROCEDURE — 87641 MR-STAPH DNA AMP PROBE: CPT

## 2024-01-17 PROCEDURE — 82330 ASSAY OF CALCIUM: CPT

## 2024-01-17 PROCEDURE — 83036 HEMOGLOBIN GLYCOSYLATED A1C: CPT

## 2024-01-17 PROCEDURE — 99232 SBSQ HOSP IP/OBS MODERATE 35: CPT

## 2024-01-17 PROCEDURE — 82150 ASSAY OF AMYLASE: CPT

## 2024-01-17 PROCEDURE — 83690 ASSAY OF LIPASE: CPT

## 2024-01-17 PROCEDURE — 82607 VITAMIN B-12: CPT

## 2024-01-17 PROCEDURE — 85027 COMPLETE CBC AUTOMATED: CPT

## 2024-01-17 PROCEDURE — 89060 EXAM SYNOVIAL FLUID CRYSTALS: CPT

## 2024-01-17 PROCEDURE — 36600 WITHDRAWAL OF ARTERIAL BLOOD: CPT

## 2024-01-17 PROCEDURE — 82728 ASSAY OF FERRITIN: CPT

## 2024-01-17 PROCEDURE — 87077 CULTURE AEROBIC IDENTIFY: CPT

## 2024-01-17 PROCEDURE — 87205 SMEAR GRAM STAIN: CPT

## 2024-01-17 PROCEDURE — 80053 COMPREHEN METABOLIC PANEL: CPT

## 2024-01-17 PROCEDURE — 99239 HOSP IP/OBS DSCHRG MGMT >30: CPT

## 2024-01-17 PROCEDURE — 83615 LACTATE (LD) (LDH) ENZYME: CPT

## 2024-01-17 PROCEDURE — 72195 MRI PELVIS W/O DYE: CPT

## 2024-01-17 PROCEDURE — 84100 ASSAY OF PHOSPHORUS: CPT

## 2024-01-17 PROCEDURE — 97163 PT EVAL HIGH COMPLEX 45 MIN: CPT

## 2024-01-17 PROCEDURE — 85610 PROTHROMBIN TIME: CPT

## 2024-01-17 PROCEDURE — 86870 RBC ANTIBODY IDENTIFICATION: CPT

## 2024-01-17 PROCEDURE — 73090 X-RAY EXAM OF FOREARM: CPT

## 2024-01-17 PROCEDURE — 97110 THERAPEUTIC EXERCISES: CPT

## 2024-01-17 PROCEDURE — 93970 EXTREMITY STUDY: CPT

## 2024-01-17 PROCEDURE — 87637 SARSCOV2&INF A&B&RSV AMP PRB: CPT

## 2024-01-17 PROCEDURE — 87070 CULTURE OTHR SPECIMN AEROBIC: CPT

## 2024-01-17 PROCEDURE — P9016: CPT

## 2024-01-17 PROCEDURE — 92523 SPEECH SOUND LANG COMPREHEN: CPT

## 2024-01-17 PROCEDURE — 36415 COLL VENOUS BLD VENIPUNCTURE: CPT

## 2024-01-17 PROCEDURE — 73223 MRI JOINT UPR EXTR W/O&W/DYE: CPT

## 2024-01-17 PROCEDURE — 97530 THERAPEUTIC ACTIVITIES: CPT

## 2024-01-17 PROCEDURE — 84480 ASSAY TRIIODOTHYRONINE (T3): CPT

## 2024-01-17 PROCEDURE — 94003 VENT MGMT INPAT SUBQ DAY: CPT

## 2024-01-17 PROCEDURE — 87591 N.GONORRHOEAE DNA AMP PROB: CPT

## 2024-01-17 PROCEDURE — 83010 ASSAY OF HAPTOGLOBIN QUANT: CPT

## 2024-01-17 PROCEDURE — 94002 VENT MGMT INPAT INIT DAY: CPT

## 2024-01-17 PROCEDURE — 87075 CULTR BACTERIA EXCEPT BLOOD: CPT

## 2024-01-17 PROCEDURE — 80202 ASSAY OF VANCOMYCIN: CPT

## 2024-01-17 PROCEDURE — 83605 ASSAY OF LACTIC ACID: CPT

## 2024-01-17 PROCEDURE — 87449 NOS EACH ORGANISM AG IA: CPT

## 2024-01-17 PROCEDURE — 73030 X-RAY EXAM OF SHOULDER: CPT

## 2024-01-17 RX ORDER — ACETAMINOPHEN 500 MG
17.03 TABLET ORAL
Qty: 100 | Refills: 5
Start: 2024-01-17

## 2024-01-17 RX ORDER — HUMAN INSULIN 100 [IU]/ML
0 INJECTION, SUSPENSION SUBCUTANEOUS
Refills: 0
Start: 2024-01-17

## 2024-01-17 RX ORDER — INSULIN HUMAN 100 [IU]/ML
34 INJECTION, SOLUTION SUBCUTANEOUS
Qty: 10 | Refills: 5
Start: 2024-01-17

## 2024-01-17 RX ORDER — NALOXONE HYDROCHLORIDE 4 MG/.1ML
1 SPRAY NASAL
Qty: 2 | Refills: 0
Start: 2024-01-17

## 2024-01-17 RX ORDER — GABAPENTIN 400 MG/1
100 CAPSULE ORAL
Qty: 0 | Refills: 0 | DISCHARGE
Start: 2024-01-17

## 2024-01-17 RX ORDER — OXYCODONE HYDROCHLORIDE 5 MG/1
5 TABLET ORAL
Qty: 0 | Refills: 0 | DISCHARGE
Start: 2024-01-17

## 2024-01-17 RX ORDER — LIDOCAINE 4 G/100G
1 CREAM TOPICAL
Qty: 30 | Refills: 0
Start: 2024-01-17 | End: 2024-02-15

## 2024-01-17 RX ORDER — INSULIN HUMAN 100 [IU]/ML
15 INJECTION, SOLUTION SUBCUTANEOUS
Qty: 10 | Refills: 5
Start: 2024-01-17

## 2024-01-17 RX ORDER — GABAPENTIN 400 MG/1
100 CAPSULE ORAL
Qty: 50 | Refills: 5
Start: 2024-01-17

## 2024-01-17 RX ORDER — ACETAMINOPHEN 500 MG
650 TABLET ORAL
Qty: 100 | Refills: 6 | DISCHARGE
Start: 2024-01-17

## 2024-01-17 RX ORDER — LIDOCAINE 4 G/100G
1 CREAM TOPICAL
Qty: 30 | Refills: 5
Start: 2024-01-17 | End: 2024-07-14

## 2024-01-17 RX ORDER — OXYCODONE HYDROCHLORIDE 5 MG/1
5 TABLET ORAL
Qty: 140 | Refills: 0
Start: 2024-01-17 | End: 2024-01-23

## 2024-01-17 RX ORDER — INSULIN HUMAN 100 [IU]/ML
6 INJECTION, SOLUTION SUBCUTANEOUS
Qty: 10 | Refills: 5
Start: 2024-01-17

## 2024-01-17 RX ORDER — INSULIN HUMAN 100 [IU]/ML
0 INJECTION, SOLUTION SUBCUTANEOUS
Refills: 0
Start: 2024-01-17

## 2024-01-17 RX ORDER — GABAPENTIN 400 MG/1
250 CAPSULE ORAL
Qty: 50 | Refills: 5
Start: 2024-01-17

## 2024-01-17 RX ORDER — INSULIN GLARGINE 100 [IU]/ML
19 INJECTION, SOLUTION SUBCUTANEOUS
Qty: 10 | Refills: 5
Start: 2024-01-17

## 2024-01-17 RX ORDER — FENTANYL CITRATE 50 UG/ML
1 INJECTION INTRAVENOUS
Qty: 1 | Refills: 0
Start: 2024-01-17 | End: 2024-01-31

## 2024-01-17 RX ORDER — HUMAN INSULIN 100 [IU]/ML
15 INJECTION, SUSPENSION SUBCUTANEOUS
Refills: 0 | DISCHARGE

## 2024-01-17 RX ORDER — INSULIN HUMAN 100 [IU]/ML
34 INJECTION, SOLUTION SUBCUTANEOUS
Qty: 3 | Refills: 0
Start: 2024-01-17

## 2024-01-17 RX ORDER — ACETAMINOPHEN 500 MG
17.03 TABLET ORAL
Qty: 100 | Refills: 6
Start: 2024-01-17

## 2024-01-17 RX ORDER — GABAPENTIN 400 MG/1
2 CAPSULE ORAL
Qty: 30 | Refills: 5
Start: 2024-01-17

## 2024-01-17 RX ORDER — GABAPENTIN 400 MG/1
100 CAPSULE ORAL
Qty: 50 | Refills: 5 | DISCHARGE
Start: 2024-01-17

## 2024-01-17 RX ORDER — FENTANYL CITRATE 50 UG/ML
1 INJECTION INTRAVENOUS
Qty: 5 | Refills: 0
Start: 2024-01-17

## 2024-01-17 RX ORDER — NYSTATIN CREAM 100000 [USP'U]/G
1 CREAM TOPICAL
Qty: 3 | Refills: 3
Start: 2024-01-17

## 2024-01-17 RX ORDER — INSULIN HUMAN 100 [IU]/ML
19 INJECTION, SOLUTION SUBCUTANEOUS
Qty: 3 | Refills: 0
Start: 2024-01-17

## 2024-01-17 RX ORDER — INSULIN HUMAN 100 [IU]/ML
6 INJECTION, SOLUTION SUBCUTANEOUS
Refills: 0 | Status: DISCONTINUED | OUTPATIENT
Start: 2024-01-17 | End: 2024-01-17

## 2024-01-17 RX ORDER — NALOXONE HYDROCHLORIDE 4 MG/.1ML
8 SPRAY NASAL
Qty: 2 | Refills: 2
Start: 2024-01-17

## 2024-01-17 RX ORDER — INSULIN HUMAN 100 [IU]/ML
12 INJECTION, SOLUTION SUBCUTANEOUS
Qty: 3 | Refills: 0 | DISCHARGE
Start: 2024-01-17

## 2024-01-17 RX ORDER — GLUCAGON INJECTION, SOLUTION 0.5 MG/.1ML
1 INJECTION, SOLUTION SUBCUTANEOUS
Qty: 2 | Refills: 0
Start: 2024-01-17

## 2024-01-17 RX ORDER — INSULIN HUMAN 100 [IU]/ML
15 INJECTION, SOLUTION SUBCUTANEOUS
Refills: 0 | Status: DISCONTINUED | OUTPATIENT
Start: 2024-01-17 | End: 2024-01-17

## 2024-01-17 RX ORDER — DICLOFENAC SODIUM 30 MG/G
1 GEL TOPICAL
Qty: 3 | Refills: 3
Start: 2024-01-17

## 2024-01-17 RX ORDER — FENTANYL CITRATE 50 UG/ML
1 INJECTION INTRAVENOUS ONCE
Refills: 0 | Status: COMPLETED | OUTPATIENT
Start: 2024-01-17 | End: 2024-01-17

## 2024-01-17 RX ORDER — PHENYLEPHRINE-SHARK LIVER OIL-MINERAL OIL-PETROLATUM RECTAL OINTMENT
1 OINTMENT (GRAM) RECTAL
Qty: 2 | Refills: 3
Start: 2024-01-17

## 2024-01-17 RX ORDER — INFLUENZA VIRUS VACCINE 15; 15; 15; 15 UG/.5ML; UG/.5ML; UG/.5ML; UG/.5ML
0.5 SUSPENSION INTRAMUSCULAR ONCE
Refills: 0 | Status: COMPLETED | OUTPATIENT
Start: 2024-01-17 | End: 2024-01-17

## 2024-01-17 RX ADMIN — Medication 545 MILLIGRAM(S): at 01:24

## 2024-01-17 RX ADMIN — LIDOCAINE 2 PATCH: 4 CREAM TOPICAL at 11:25

## 2024-01-17 RX ADMIN — Medication 1 APPLICATION(S): at 06:08

## 2024-01-17 RX ADMIN — Medication 500 MILLIGRAM(S): at 11:23

## 2024-01-17 RX ADMIN — ESCITALOPRAM OXALATE 10 MILLIGRAM(S): 10 TABLET, FILM COATED ORAL at 08:48

## 2024-01-17 RX ADMIN — Medication 3 MILLILITER(S): at 15:20

## 2024-01-17 RX ADMIN — Medication 15 MILLILITER(S): at 11:22

## 2024-01-17 RX ADMIN — Medication 5 MILLIGRAM(S): at 06:07

## 2024-01-17 RX ADMIN — Medication 2 DROP(S): at 11:25

## 2024-01-17 RX ADMIN — LIDOCAINE 1 PATCH: 4 CREAM TOPICAL at 11:23

## 2024-01-17 RX ADMIN — PHENYLEPHRINE-SHARK LIVER OIL-MINERAL OIL-PETROLATUM RECTAL OINTMENT 1 APPLICATION(S): at 11:46

## 2024-01-17 RX ADMIN — Medication 5 MILLILITER(S): at 06:07

## 2024-01-17 RX ADMIN — AMLODIPINE BESYLATE 10 MILLIGRAM(S): 2.5 TABLET ORAL at 06:06

## 2024-01-17 RX ADMIN — LIDOCAINE 1 PATCH: 4 CREAM TOPICAL at 01:26

## 2024-01-17 RX ADMIN — Medication 0: at 06:32

## 2024-01-17 RX ADMIN — OXYCODONE HYDROCHLORIDE 5 MILLIGRAM(S): 5 TABLET ORAL at 09:18

## 2024-01-17 RX ADMIN — SIMETHICONE 80 MILLIGRAM(S): 80 TABLET, CHEWABLE ORAL at 06:06

## 2024-01-17 RX ADMIN — CHLORHEXIDINE GLUCONATE 15 MILLILITER(S): 213 SOLUTION TOPICAL at 06:08

## 2024-01-17 RX ADMIN — LIDOCAINE 2 PATCH: 4 CREAM TOPICAL at 01:27

## 2024-01-17 RX ADMIN — CHLORHEXIDINE GLUCONATE 1 APPLICATION(S): 213 SOLUTION TOPICAL at 06:08

## 2024-01-17 RX ADMIN — Medication 2 DROP(S): at 06:08

## 2024-01-17 RX ADMIN — Medication 545 MILLIGRAM(S): at 06:46

## 2024-01-17 RX ADMIN — Medication 545 MILLIGRAM(S): at 06:07

## 2024-01-17 RX ADMIN — OXYCODONE HYDROCHLORIDE 5 MILLIGRAM(S): 5 TABLET ORAL at 08:48

## 2024-01-17 RX ADMIN — Medication 5 MILLILITER(S): at 14:28

## 2024-01-17 RX ADMIN — GABAPENTIN 100 MILLIGRAM(S): 400 CAPSULE ORAL at 06:07

## 2024-01-17 RX ADMIN — Medication 3 MILLILITER(S): at 05:51

## 2024-01-17 RX ADMIN — INSULIN HUMAN 34 UNIT(S): 100 INJECTION, SOLUTION SUBCUTANEOUS at 06:33

## 2024-01-17 RX ADMIN — LIDOCAINE 1 PATCH: 4 CREAM TOPICAL at 11:24

## 2024-01-17 RX ADMIN — NYSTATIN CREAM 1 APPLICATION(S): 100000 CREAM TOPICAL at 06:09

## 2024-01-17 RX ADMIN — GABAPENTIN 100 MILLIGRAM(S): 400 CAPSULE ORAL at 14:27

## 2024-01-17 RX ADMIN — Medication 125 MICROGRAM(S): at 06:06

## 2024-01-17 RX ADMIN — Medication 545 MILLIGRAM(S): at 11:22

## 2024-01-17 RX ADMIN — LIDOCAINE 1 PATCH: 4 CREAM TOPICAL at 01:25

## 2024-01-17 RX ADMIN — NYSTATIN CREAM 1 APPLICATION(S): 100000 CREAM TOPICAL at 15:42

## 2024-01-17 RX ADMIN — INFLUENZA VIRUS VACCINE 0.5 MILLILITER(S): 15; 15; 15; 15 SUSPENSION INTRAMUSCULAR at 11:18

## 2024-01-17 RX ADMIN — ZINC OXIDE 1 APPLICATION(S): 200 OINTMENT TOPICAL at 11:46

## 2024-01-17 RX ADMIN — Medication 1 APPLICATION(S): at 11:25

## 2024-01-17 RX ADMIN — PANTOPRAZOLE SODIUM 40 MILLIGRAM(S): 20 TABLET, DELAYED RELEASE ORAL at 08:47

## 2024-01-17 RX ADMIN — Medication 1 TABLET(S): at 06:07

## 2024-01-17 RX ADMIN — SIMETHICONE 80 MILLIGRAM(S): 80 TABLET, CHEWABLE ORAL at 11:22

## 2024-01-17 RX ADMIN — INSULIN GLARGINE 19 UNIT(S): 100 INJECTION, SOLUTION SUBCUTANEOUS at 06:33

## 2024-01-17 NOTE — PROGRESS NOTE ADULT - SUBJECTIVE AND OBJECTIVE BOX
[facility]-- WOUND TEAM -- FOLLOW UP NOTE  --------------------------------------------------------------------------------    24 hour events/subjective:  afebrile  alert  tolerating TF w/o vomiting  tolerating dressings     no odor pain excess drainage  incontinent- soft pasty stool  daughter at bedside- please w/ wound's progress     all questions answered to her expressed statisfaction  dc planning      Diet:  Diet, NPO with Tube Feed:   Tube Feeding Modality: Gastrostomy  [email or web address] glucose support 1.2  Total Volume for 24 Hours (mL): 1200  Continuous  Starting Tube Feed Rate mL per Hour: 60  Increase Tube Feed Rate by (mL): 0  Until Goal Tube Feed Rate (mL per Hour): 60  Tube Feed Duration (in Hours): 20  Tube Feed Start Time: 06:00  Tube Feed Stop Time: 02:00  Free Water Flush  Pump   Rate (mL per Hour): 20   Frequency: Every 2 Hours  Alfred(7 Gm Arginine/7 Gm Glut/1.2 Gm HMB     Qty per Day:  2 (01-10-24 @ 17:19)      ROS: pt unable to offer    ALLERGIES & MEDICATIONS  --------------------------------------------------------------------------------  Allergies  penicillin (Unknown)  heparin (Unknown)  Tagamet (Unknown)  pineapple (Unknown)  walnut (Unknown)  Andressa Rawls Hazelcharlene (Unknown)  Lyrica (Unknown)      STANDING INPATIENT MEDICATIONS  acetaminophen Oral Liquid 545 milliGRAM(s) Oral every 6 hours  albuterol/ipratropium for Nebulization 3 milliLiter(s) Nebulizer every 8 hours  amLODIPine   Tablet 10 milliGRAM(s) Oral daily  artificial  tears Solution 2 Drop(s) Both EYES four times a day  ascorbic acid 500 milliGRAM(s) Oral daily  Biotene Dry Mouth Oral Rinse 5 milliLiter(s) Swish and Spit every 6 hours  calcium carbonate    500 mG (Tums) Chewable 1 Tablet(s) Chew every 12 hours  chlorhexidine 0.12% Liquid 15 milliLiter(s) Oral Mucosa every 12 hours  chlorhexidine 4% Liquid 1 Application(s) Topical <User Schedule>  dextrose 50% Injectable 12.5 Gram(s) IV Push once  dextrose 50% Injectable 25 Gram(s) IV Push once  escitalopram 10 milliGRAM(s) Oral <User Schedule>  fentaNYL   Patch  25 MICROgram(s)/Hr 1 Patch Transdermal every 72 hours  gabapentin Solution 250 milliGRAM(s) Enteral Tube at bedtime  gabapentin Solution 100 milliGRAM(s) Oral <User Schedule>  glucagon  Injectable 1 milliGRAM(s) IntraMuscular once  hemorrhoidal Ointment 1 Application(s) Rectal daily  insulin glargine Injectable (LANTUS) 19 Unit(s) SubCutaneous <User Schedule>  insulin lispro (ADMELOG) corrective regimen sliding scale   SubCutaneous every 6 hours  insulin regular  human recombinant 6 Unit(s) SubCutaneous <User Schedule>  insulin regular  human recombinant 34 Unit(s) SubCutaneous <User Schedule>  insulin regular  human recombinant 15 Unit(s) SubCutaneous <User Schedule>  levothyroxine 125 MICROGram(s) Oral <User Schedule>  lidocaine   4% Patch 1 Patch Transdermal daily  lidocaine   4% Patch 2 Patch Transdermal daily  lidocaine   4% Patch 1 Patch Transdermal daily  lidocaine 2% Viscous 5 milliLiter(s) Mucosal two times a day  melatonin 1 milliGRAM(s) Oral at bedtime  melatonin 5 milliGRAM(s) Oral at bedtime  multivitamin/minerals/iron Oral Solution (CENTRUM) 15 milliLiter(s) Oral daily  nystatin Powder 1 Application(s) Topical every 8 hours  pantoprazole   Suspension 40 milliGRAM(s) Enteral Tube <User Schedule>  petrolatum Ophthalmic Ointment 1 Application(s) Both EYES four times a day  predniSONE  Solution 5 milliGRAM(s) Oral <User Schedule>  QUEtiapine 12.5 milliGRAM(s) Oral <User Schedule>  silver nitrate Applicator 1 Application(s) Topical once  simethicone 80 milliGRAM(s) Chew four times a day  zinc oxide 40% Paste 1 Application(s) Topical daily      PRN INPATIENT MEDICATION  benzocaine 20% Spray 1 Spray(s) Topical four times a day PRN  diclofenac sodium 1% Gel 2 Gram(s) Topical four times a day PRN  diphenhydrAMINE Elixir 25 milliGRAM(s) Oral every 6 hours PRN  guaifenesin/dextromethorphan Oral Liquid 10 milliLiter(s) Oral every 6 hours PRN  ketorolac   Injectable 15 milliGRAM(s) IV Push every 6 hours PRN  oxyCODONE    Solution 5 milliGRAM(s) Oral every 6 hours PRN  sodium chloride 0.65% Nasal 1 Spray(s) Both Nostrils every 6 hours PRN        VITALS/PHYSICAL EXAM  --------------------------------------------------------------------------------  T(C): 36.7 (01-17-24 @ 06:00), Max: 36.7 (01-16-24 @ 20:52)  HR: 75 (01-17-24 @ 09:27) (67 - 78)  BP: 140/62 (01-17-24 @ 06:00) (128/57 - 140/62)  RR: 19 (01-17-24 @ 09:26) (14 - 20)  SpO2: 100% (01-17-24 @ 09:27) (98% - 100%)  Wt(kg): --    NAD,  Alert, frail,  WD/ WN/ WG  Total Care Sport bed  HEENT:  NC/AT, EOMI, sclera clear, mucosa moist, throat clear, trach       w/o secretions or peritrach skin irration  Gastrointestinal: soft NT/ND (+)PEG w/o drainage or skin irration  : (+) primafit  Neurology:  nonverbal, no follow commands, paraplegic  Psych: calm/ appropriate  Musculoskeletal:  pROM, no deformities/ contractures   Vascular: BLE equally warm,  no cyanosis, clubbing nor acute ischemia  Skin:  moist w/ good turgor  Rt Posterior flank w/ necrotic skin tag  Sacral stage 4 pressure injury  3cm x 1.5cm x 0.1cm   moist granular wound bed   palpable but not exposed bone  no blistering   (+) serosanguinous drainage  No odor, erythema, increased warmth, tenderness, induration, fluctuance, nor crepitus      A1C with Estimated Average Glucose Result: 7.5 % (10-28-23 @ 07:18)      CAPILLARY BLOOD GLUCOSE  POCT Blood Glucose.: 109 mg/dL (17 Jan 2024 11:56)  POCT Blood Glucose.: 116 mg/dL (17 Jan 2024 06:17)  POCT Blood Glucose.: 135 mg/dL (16 Jan 2024 23:24)  POCT Blood Glucose.: 85 mg/dL (16 Jan 2024 17:43)        Culture - Blood (collected 01-12-24 @ 16:29)  Source: .Blood Blood-Peripheral  Preliminary Report (01-16-24 @ 23:01):    No growth at 4 days

## 2024-01-17 NOTE — PROGRESS NOTE ADULT - ASSESSMENT

## 2024-01-17 NOTE — PROGRESS NOTE ADULT - NS_MD_PANP_GEN_ALL_CORE

## 2024-01-17 NOTE — PROGRESS NOTE ADULT - PROBLEM/PLAN-11
DISPLAY PLAN FREE TEXT
DISPLAY PLAN FREE TEXT
17-Dec-2021 11:26
DISPLAY PLAN FREE TEXT

## 2024-01-17 NOTE — PROGRESS NOTE ADULT - NS ATTEND AMEND GEN_ALL_CORE FT
73F with a h/o DM, HTN, HLD, anemia, hypothyroidism, RA, fibromyalgia, remote cerebral aneurysm with repair, hypercapnic respiratory failure s/p tracheostomy/PEG, bedbound, sacral pressure ulcer. Admitted w/ bleeding from tracheostomy and PEG w/ associated abdominal pain, recent hx of auditory/visual hallucinations. Imaging showed mild thickening along PEG tube tract and sacral ulcer extending to coccyx suggestive of acute osteomyelitis.    Remains on chronic mech vent, unable to wean.  Tolerating PEG feeds  Sacral OM s/p 6 week course of Cipro and Keflex as per ID - completed 12/10/23. Appreciate wound care follow up.   Right shoulder pain likely from immobility (frozen shoulder) vs. inflammatory. c/w Neurontin and Oxycodone, Lidocaine patch and Voltaren. X-Ray with osteopenia, no fracture. UE dopplers negative. On Prednisone taper. Seen by Orthopedics, mri shoulder done, s/p minimal fluid aspiration, cultures NGTD  Patient is medically stable for discharge home today

## 2024-01-17 NOTE — PROGRESS NOTE ADULT - TIME-BASED BILLING (NON-CRITICAL CARE)
Time-based billing (NON-critical care)

## 2024-01-17 NOTE — PROGRESS NOTE ADULT - PROBLEM SELECTOR PROBLEM 10
Need for prophylactic measure

## 2024-01-17 NOTE — PROGRESS NOTE ADULT - NS ATTEST RISKLEV GEN_ALL_CORE
High
Moderate
Moderate
High
Moderate
High
Moderate
High
Moderate
High
Moderate
Moderate

## 2024-01-17 NOTE — PROGRESS NOTE ADULT - NS ATTEND BILL GEN_ALL_CORE
Attending to bill

## 2024-01-17 NOTE — PROGRESS NOTE ADULT - NS ATTEST RISK GEN_ALL_CORE
Risk Statement (NON-critical care)

## 2024-01-17 NOTE — PROGRESS NOTE ADULT - PROBLEM SELECTOR PROBLEM 2
Hypothyroidism
Sacral osteomyelitis
Acute respiratory failure with hypoxia
Acute respiratory failure with hypoxia
Sacral osteomyelitis
Acute respiratory failure with hypoxia
Sacral osteomyelitis
Secondary adrenal insufficiency
Secondary adrenal insufficiency
Acute respiratory failure with hypoxia
Sacral osteomyelitis
Secondary adrenal insufficiency
Acute respiratory failure with hypoxia
Acute respiratory failure with hypoxia
Sacral osteomyelitis
Secondary adrenal insufficiency
Acute respiratory failure with hypoxia
Hypothyroidism
Sacral osteomyelitis
Sacral osteomyelitis
Secondary adrenal insufficiency
Acute respiratory failure with hypoxia
Acute respiratory failure with hypoxia
Sacral osteomyelitis
Secondary adrenal insufficiency
Acute respiratory failure with hypoxia
Sacral osteomyelitis
Acute respiratory failure with hypoxia
Sacral osteomyelitis
Secondary adrenal insufficiency
Acute respiratory failure with hypoxia
Sacral osteomyelitis
Sacral osteomyelitis
Steroid-induced hyperglycemia
Sacral osteomyelitis
Acute respiratory failure with hypoxia
Acute respiratory failure with hypoxia
Sacral osteomyelitis
Acute respiratory failure with hypoxia
Hypothyroidism
Sacral osteomyelitis
Acute respiratory failure with hypoxia
Acute respiratory failure with hypoxia
Sacral osteomyelitis
Secondary adrenal insufficiency
Acute respiratory failure with hypoxia
Acute respiratory failure with hypoxia
Sacral osteomyelitis
Sacral osteomyelitis
Secondary adrenal insufficiency
Sacral osteomyelitis
Sacral osteomyelitis
Hypothyroidism
Sacral osteomyelitis
Acute respiratory failure with hypoxia
Sacral osteomyelitis
Sacral osteomyelitis
Acute respiratory failure with hypoxia
Sacral osteomyelitis
Secondary adrenal insufficiency
Sacral osteomyelitis
Secondary adrenal insufficiency
Sacral osteomyelitis
Secondary adrenal insufficiency
Sacral osteomyelitis

## 2024-01-17 NOTE — CHART NOTE - NSCHARTNOTEFT_GEN_A_CORE
72 y/o F with PMHx of T2DM, HTN, HLD, anemia, hypothyroidism, RA, fibromyalgia, remote cerebral aneurysm repair, acute hypercapnic respiratory failure now with tracheostomy, PEG tube, and bedbound (trach change 8/18, PEG change 8/14), sacral pressure ulcer (likely OM)    Admitted for bleeding from the tracheostomy and PEG tube with associated abdominal pain; daughter reports auditory and visual hallucinations (seeing animals that were not there & hearing a "bomb" going off outside her home)    Pt w/ long length of stay  CT neck imaging showing no evidence of active bleeding around the tracheostomy site, no hematomas, and no drainable collection around the tracheostomy site.   CT abdomen/pelvis notable for gastrostomy tube localized to the stomach with mild thickening noted along the tract; Sacral decubitus ulcer extending to the coccyx with osseous remodeling, possibly representing osteomyelitis; and bibasilar patchy opacities with volume loss in the lungs, representing atelectasis vs infection.   Patient admitted for respiratory support, wound care of tracheostomy and PEG, and management of presumed sacral osteomyelitis. (28 Oct 2023 04:18)    Out- patient pain regimen: Fentanyl 25 mcg/hr Q3d    Out Patient Pain Management provider: Dr. Polo Gottlieb    [facility] Prescription Monitoring Program: Reference #[id number]    Called by primary team, pt is scheduled for discharge today, daughter requesting Chronic Pain consult. Chronic Pain service does not do consults on day of discharge, as changes cannot be made to regimen with no time to assess for response. Service agreed to review EMR and assess current regimen for appropriateness or need for change.  Patient is presently on Fentanyl 25 mcg/hr every 3 days, continuation of home regimen. Patient is on Acetaminophen and Gabapentin as well, last LFT's ands CrCl WNL.  Patient is ordered for Toradol and Oxycodone for breakthrough pain with minimal use. Toradol x 1 on 1/15 and 1/16; Oxycodone x 1 this morning, prior to that 2-3 doses on 1/13 (4 days ago). RN documentation pain level decreases to 0/10.    Per EMR review pain appears controlled w/ current regimen, pt requiring minimal use of breakthrough analgesics.     Chronic Pain Service has no additional recommendations at this time.  Recommend Narcan Rescue Kit on discharge (Naloxone 4 mg/0.1 ml nasal spray - 1 spray q 2-3 minutes alternating between nostrils)    Will hold on Chronic Pain Service consult at this time    Chronic Pain Service  [phone]

## 2024-01-17 NOTE — CHART NOTE - NSCHARTNOTESELECT_GEN_ALL_CORE
Chart Note
Endocrine follow up/Event Note
Endocrine follow up/Event Note
Endocrinology
Endocrinology/Event Note
Event Note
Event Note
ID Off Service Note
MRI/Event Note
Nutrition Services
Orthopedics/Event Note
endocrine/Event Note
Chronic Pain/Event Note
Endocrine follow up/Event Note
Nutrition Services
Speech & Swallow
endocrine/Event Note
Endocrine follow up/Event Note
Event Note
Event Note
Nutrition Services
Nutrition Services
endocrine/Event Note
endocrinology/Event Note
Orthopedic Shoulder Aspiration/Event Note
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
## 2024-01-17 NOTE — PROGRESS NOTE ADULT - NS ATTEND OPT1 GEN_ALL_CORE
I attest my time as attending is greater than 50% of the total combined time spent on qualifying patient care activities by the PA/NP and attending.

## 2024-01-17 NOTE — PROGRESS NOTE ADULT - PROBLEM SELECTOR PLAN 2
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   - S/p CT abd/pelvis (10/28): Sacral decubitus ulcer extending to the coccyx with osseous remodeling and pelvic MRI or bone scan to recc to exclude osteomyelitis.  - 10/30 wound care note: Sacral stage 4 pressure injury 4.5cm x 2.5cm x 1.5cm w/ lip of undermining circumferentially greatest 9-12 of 3cm.  - MRI pelvis 10/30 with Osteomyelitis, completed Cefepime for 7 days, Vancomycin d/marli   - 11/10: resumed Cipro 500mg q 12 and Keflex 500mg q 6 until 12/10.  - 11/20: Changed to IV Cipro 400 q12 as recommended by ID pharmacist due to multiple drug interactions when given via PEG  - 11/28 wound care note: Sacral stage 4 pressure injury 4.5cm x 2.5cm x 0.5cm, moist granular wound bed   palpable but not exposed bone no blistering, (+) serosanguinous drainage. No odor, erythema, increased warmth, tenderness, induration, fluctuance, nor crepitus.  - 12/3 wound culture ordered - d/c'd as no need for culture, wound improving, no signs of infection  - 12/10 completed cipro and keflex x5 week course  - wound improving, wound care following 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   - S/p CT abd/pelvis (10/28): Sacral decubitus ulcer extending to the coccyx with osseous remodeling and pelvic MRI or bone scan to recc to exclude osteomyelitis.  - 10/30 wound care note: Sacral stage 4 pressure injury 4.5cm x 2.5cm x 1.5cm w/ lip of undermining circumferentially greatest 9-12 of 3cm.  - MRI pelvis 10/30 with Osteomyelitis, completed Cefepime for 7 days, Vancomycin d/marli   - 11/10: resumed Cipro 500mg q 12 and Keflex 500mg q 6 until 12/10.  - 11/20: Changed to IV Cipro 400 q12 as recommended by ID pharmacist due to multiple drug interactions when given via PEG  - 11/28 wound care note: Sacral stage 4 pressure injury 4.5cm x 2.5cm x 0.5cm, moist granular wound bed   palpable but not exposed bone no blistering, (+) serosanguinous drainage. No odor, erythema, increased warmth, tenderness, induration, fluctuance, nor crepitus.  - 12/3 wound culture ordered - d/c'd as no need for culture, wound improving, no signs of infection  - 12/10 completed cipro and keflex x5 week course  - wound improving, wound care following  Ortho discussed with daughter and aspiration of joint obtained. Results to culture showed no growth, ortho signed off case.  Pain NP reviewed medication regimen and would not add any additional medications.

## 2024-01-17 NOTE — PROGRESS NOTE ADULT - PROBLEM SELECTOR PLAN 7
- Continue home Prednisone regimen 5 mg daily   *fibromyalgia  - continue home Fentanyl 25mcg Q72H patches  - c/w Lidocaine patch   - Oxycodone 2.5mg q6 hrs PRN (in addition to Tylenol PRN)  - 1/7-1/8 Right forearm and BL shoulder pain - w/u negative for DVT, imaging negative for acute fracture/dislocation, will continue with pain management as needed with meds above  - 1/9 increased gabapentin to 250mg PO, oxycodone increased to 5mg  -1/10: Gabapentin 100mg 6AM 2PM and 250mg HS - Continue home Prednisone regimen 5 mg daily   *fibromyalgia  - continue home Fentanyl 25mcg Q72H patches  - c/w Lidocaine patch   - Oxycodone 2.5mg q6 hrs PRN (in addition to Tylenol PRN)  - 1/7-1/8 Right forearm and BL shoulder pain - w/u negative for DVT, imaging negative for acute fracture/dislocation, will continue with pain management as needed with meds above  - 1/9 increased gabapentin to 250mg PO, oxycodone increased to 5mg  -1/10: Gabapentin 100mg 6AM 2PM and 250mg HS  Ortho discussed with daughter and aspiration of joint obtained. Results to culture showed no growth, ortho signed off case.  Pain NP reviewed medication regimen and would not add any additional medications.

## 2024-01-17 NOTE — PROGRESS NOTE ADULT - PROVIDER SPECIALTY LIST ADULT
Dental
Endocrinology
Endocrinology
Gastroenterology
Heme/Onc
Infectious Disease
Internal Medicine
Orthopedics
Orthopedics
Pulmonology
Wound Care
ENT
Gastroenterology
Infectious Disease
Internal Medicine
Internal Medicine
Wound Care
Wound Care
Endocrinology
Gastroenterology
Heme/Onc
Infectious Disease
Infectious Disease
Internal Medicine
Orthopedics
Pulmonology
Pulmonology
Wound Care
Wound Care
Endocrinology
Gastroenterology
Heme/Onc
Internal Medicine
Internal Medicine
Orthopedics
Pulmonology
Pulmonology
Wound Care
Endocrinology
Gastroenterology
Internal Medicine
Internal Medicine
Pulmonology
Internal Medicine
Pulmonology
Endocrinology
Gastroenterology
Pulmonology
Endocrinology
Endocrinology
Internal Medicine
Pulmonology
Pulmonology
Endocrinology
Internal Medicine
Internal Medicine
Pulmonology
Pulmonology
Endocrinology
Endocrinology
Internal Medicine
Endocrinology
Endocrinology
Internal Medicine
Pulmonology
Pulmonology
Internal Medicine
Internal Medicine
Pulmonology
Internal Medicine
Internal Medicine
Pulmonology
Internal Medicine
Pulmonology
Internal Medicine
Pulmonology
Internal Medicine
Internal Medicine
Pulmonology
Internal Medicine
Endocrinology
Pulmonology
Internal Medicine
Pulmonology
Endocrinology
Pulmonology
Internal Medicine
Pulmonology
Endocrinology
Pulmonology
Endocrinology
Pulmonology

## 2024-01-17 NOTE — CHART NOTE - NSCHARTNOTEFT_GEN_A_CORE
72 y/o F w/h/o T2DM with unknown control due to anemia of chronic disease skewing A1C levels. On Levemir and Humalog insulin PTA. Unknown DM complications. Also h/o HTN, HLD, anemia, hypothyroidism, RA, fibromyalgia, remote cerebral aneurysm repair, acute hypercapnic respiratory failure now with tracheostomy, PEG tube, and bedbound (trach change 8/18, PEG change 8/14), sacral pressure ulcer, BIBEMS from home for 6 day hx of bleeding from the tracheostomy and PEG tube with associated abdominal pain> now resolved. Endocrinology consulted for hyperglycemia. Tolerating TFs with BG at goal while on present insulin doses and Prednisone 5mg daily. BG Goal 100-180mg/dl.   BGs 85-100s, 6pm BGs tightly controlled for 2 days and pt received 3 units of regular insulin instead of 9 units at 6 pm.  BG at MN was 135. Discharge home today as per team. Recommended to adjust Regular insulin 34 units at 6 am, 15 units at 12 noon and 6 units at 18:00. Spoke with daughter at bedside and team about discharge plan.     POCT Blood Glucose:  109 mg/dL (01-17-24 @ 11:56)  116 mg/dL (01-17-24 @ 06:17)  135 mg/dL (01-16-24 @ 23:24)      eMAR:  insulin glargine Injectable (LANTUS)   19 Unit(s) SubCutaneous (01-17-24 @ 06:33)    insulin lispro (ADMELOG) corrective regimen sliding scale   0 Unit(s) SubCutaneous (01-17-24 @ 06:32)   0 Unit(s) SubCutaneous (01-16-24 @ 23:39)    insulin regular  human recombinant   34 Unit(s) SubCutaneous (01-17-24 @ 06:33)    levothyroxine   125 MICROGram(s) Oral (01-17-24 @ 06:06)    predniSONE  Solution   5 milliGRAM(s) Oral (01-17-24 @ 06:07)     # Type 2 diabetes mellitus with hyperglycemia, with long-term current use of insulin.   - FS BG monitoring [id number] while on TFs/NPO   - C/w lantus 19 units sc qAM   - Adjust Regular insulin 34 units sc at 6am, c/w 15units sc at 12 noon and 6 units sc at 6pm. PLEASE DO NOT HOLD IF PATIENT IS ON TUBE FEEDS (hold only if TF are stopped). Can decrease/adjust dose if there is a concern for hypoglycemia.   - C/w low dose admelog correction scale every 6 hours  -Please infor endo if any changes to steroid doses   DISCHARGE:  - Discharge home today   -Recommend Lantus/ Basaglar 19 units in am   -Recommend Novolin R Flex pen 34 units at 06:00, 15 units at 12:00, 6 units at 18:00  - Needs telemedicine as outpatient due to bedbound status. Can follow up at [facility] practice. [address] [place] [address] [address]. Phone . Will send email to contact pt's daughter for f/u care.   -Make sure pt has Rx for all DM supplies and insulin. Needs rx for Novolog regular insulin flex pen> this is new insulin for pt.     # Secondary adrenal insufficiency.   - Due to chronic steroid use pt is at risk of tertiary AI, please do not abruptly discontinue or hold steroids as this can result in an adrenal crisis   - c/w prednisone 5mg daily  - hemodynamically stable at present time.    # Hypothyroidism.   ·  Plan: Thyroid Function Tests:  01-09 @ 06:46 TSH 1.66 FreeT4 1.4 T3 -- Anti TPO -- Anti Thyroglobulin Ab -- TSI --  - continue levothyroxine 125mcg daily  - Please make sure LT4 is given on an empty stomach at least 30 mins to 1 hour apart from other meds and food/TFs to ensure med absorption. DO NOT GIVE WITH VITAMINS/ANTACIDS.    Spoke with team PRITI Lawson  Contact via [employer] Teams during business hours  To reach covering provider access AMION via sunrise tools  For Urgent matters/after-hours/weekends/holidays please page endocrine fellow on call   For nonurgent matters please email [email or web address]    Please note that this patient may be followed by different provider tomorrow.  Notify endocrine 24 hours prior to discharge for final recommendations

## 2024-01-17 NOTE — PROGRESS NOTE ADULT - PROBLEM SELECTOR PROBLEM 1
Sepsis, unspecified organism
Type 2 diabetes mellitus with hyperglycemia, with long-term current use of insulin
Sepsis, unspecified organism
Tracheostomy in place
Sepsis, unspecified organism
Type 2 diabetes mellitus with hyperglycemia, with long-term current use of insulin
Type 2 diabetes mellitus with hyperglycemia, with long-term current use of insulin
Sepsis, unspecified organism
Type 2 diabetes mellitus with hyperglycemia, with long-term current use of insulin
Sepsis, unspecified organism
Type 2 diabetes mellitus with hyperglycemia, with long-term current use of insulin
Sepsis, unspecified organism
Type 2 diabetes mellitus with hyperglycemia, with long-term current use of insulin
Sepsis, unspecified organism
Type 2 diabetes mellitus with hyperglycemia, with long-term current use of insulin
Sepsis, unspecified organism
Type 2 diabetes mellitus
Sepsis, unspecified organism
Type 2 diabetes mellitus with hyperglycemia, with long-term current use of insulin
Sepsis, unspecified organism
Type 2 diabetes mellitus with hyperglycemia, with long-term current use of insulin
Sepsis, unspecified organism
Sepsis, unspecified organism
Type 2 diabetes mellitus with hyperglycemia, with long-term current use of insulin
Sepsis, unspecified organism
Type 2 diabetes mellitus with hyperglycemia, with long-term current use of insulin
Sepsis, unspecified organism
Type 2 diabetes mellitus with hyperglycemia, with long-term current use of insulin
Sepsis, unspecified organism
Sepsis, unspecified organism
Type 2 diabetes mellitus with hyperglycemia, with long-term current use of insulin
Sepsis, unspecified organism
Type 2 diabetes mellitus with hyperglycemia, with long-term current use of insulin
Sepsis, unspecified organism
Type 2 diabetes mellitus with hyperglycemia, with long-term current use of insulin
Sepsis, unspecified organism
Type 2 diabetes mellitus with hyperglycemia, with long-term current use of insulin
Sepsis, unspecified organism

## 2024-01-17 NOTE — PROGRESS NOTE ADULT - PROBLEM SELECTOR PROBLEM 11
Goals of care, counseling/discussion

## 2024-01-17 NOTE — PROGRESS NOTE ADULT - SUBJECTIVE AND OBJECTIVE BOX
Patient is a 74y old  Female who presents with a chief complaint of Bleeding from PEG (16 Jan 2024 16:49)    HPI:  72 y/o F with PMHx of T2DM, HTN, HLD, anemia, hypothyroidism, RA, fibromyalgia, remote cerebral aneurysm repair, acute hypercapnic respiratory failure now with tracheostomy, PEG tube, and bedbound (trach change 8/18, PEG change 8/14), sacral pressure ulcer, BIBEMS from home for 6 day hx of bleeding from the tracheostomy and PEG tube with associated abdominal pain. Patient still having regular bowel movements, last one occurred prior to ED arrival. Was seen outpatient on 10/26 by critical care Dr. Zaki Gottlieb and apparently had cultures sent at that time. Patient also noted to have chronic sacral decubitous ulcer. Family endorsed one episode of fever, though was not febrile on evaluation. Daughter noted patient was recently experiencing auditory and visual hallucinations (seeing animals that were not there & hearing a "bomb" going off outside her home), though patient not actively hallucinating on evaluation.  ED course notable for CT neck imaging showing no evidence of active bleeding around the tracheostomy site, no hematomas, and no drainable collection around the tracheostomy site. CT abdomen/pelvis notable for gastrostomy tube localized to the stomach with mild thickening noted along the tract; a sacral decubitus ulcer extending to the coccyx with osseous remodeling, possibly representing osteomyelitis; and bibasilar patchy opacities with volume loss in the lungs, representing atelectasis vs infection. Patient admitted for respiratory support, wound care of tracheostomy and PEG, and management of presumed sacral osteomyelitis.   (28 Oct 2023 04:18)    Interval Events:    REVIEW OF SYSTEMS:  [ ] Positive  [ ] All other systems negative  [ ] Unable to assess ROS because ________    Vital Signs Last 24 Hrs  T(C): 36.7 (01-17-24 @ 06:00), Max: 36.7 (01-16-24 @ 14:00)  T(F): 98.1 (01-17-24 @ 06:00), Max: 98.1 (01-17-24 @ 06:00)  HR: 72 (01-17-24 @ 06:10) (66 - 78)  BP: 140/62 (01-17-24 @ 06:00) (117/53 - 140/62)  RR: 16 (01-17-24 @ 06:00) (14 - 20)  SpO2: 100% (01-17-24 @ 06:10) (98% - 100%)    PHYSICAL EXAM:  HEENT:   [ ]Tracheostomy:  [ ]Pupils equal  [ ]No oral lesions  [ ]Abnormal    SKIN  [ ] No Rash  [ ] Abnormal  [ ] pressure    CARDIAC  [ ]Regular  [ ]Abnormal    PULMONARY  [ ]Bilateral Clear Breath Sounds  [ ]Normal Excursion  [ ]Abnormal    GI  [ ]PEG      [ ] +BS		              [ ]Soft, nondistended, nontender	  [ ]Abnormal    MUSCULOSKELETAL                                   [ ]Bedbound                 [ ]Abnormal    [ ]Ambulatory/OOB to chair                           EXTREMITIES                                         [ ]Normal  [ ]Edema                           NEUROLOGIC  [ ] Normal, non focal  [ ] Focal findings:    PSYCHIATRIC  [ ]Alert and appropriate  [ ] Sedated	 [ ]Agitated    :  Mcbride: [ ] Yes, if yes: Date of Placement:                   [  ] No    LINES: Central Lines [ ] Yes, if yes: Date of Placement                                     [  ] No    HOSPITAL MEDICATIONS:  MEDICATIONS  (STANDING):  acetaminophen   Oral Liquid .. 545 milliGRAM(s) Oral every 6 hours  albuterol/ipratropium for Nebulization 3 milliLiter(s) Nebulizer every 8 hours  amLODIPine   Tablet 10 milliGRAM(s) Oral daily  artificial  tears Solution 2 Drop(s) Both EYES four times a day  ascorbic acid 500 milliGRAM(s) Oral daily  Biotene Dry Mouth Oral Rinse 5 milliLiter(s) Swish and Spit every 6 hours  calcium carbonate    500 mG (Tums) Chewable 1 Tablet(s) Chew every 12 hours  chlorhexidine 0.12% Liquid 15 milliLiter(s) Oral Mucosa every 12 hours  chlorhexidine 4% Liquid 1 Application(s) Topical <User Schedule>  dextrose 50% Injectable 12.5 Gram(s) IV Push once  dextrose 50% Injectable 25 Gram(s) IV Push once  escitalopram 10 milliGRAM(s) Oral <User Schedule>  fentaNYL   Patch  25 MICROgram(s)/Hr 1 Patch Transdermal every 72 hours  gabapentin Solution 250 milliGRAM(s) Enteral Tube at bedtime  gabapentin Solution 100 milliGRAM(s) Oral <User Schedule>  glucagon  Injectable 1 milliGRAM(s) IntraMuscular once  hemorrhoidal Ointment 1 Application(s) Rectal daily  hemorrhoidal Ointment      insulin glargine Injectable (LANTUS) 19 Unit(s) SubCutaneous <User Schedule>  insulin lispro (ADMELOG) corrective regimen sliding scale   SubCutaneous every 6 hours  insulin regular  human recombinant 34 Unit(s) SubCutaneous <User Schedule>  insulin regular  human recombinant 9 Unit(s) SubCutaneous <User Schedule>  insulin regular  human recombinant 17 Unit(s) SubCutaneous <User Schedule>  levothyroxine 125 MICROGram(s) Oral <User Schedule>  lidocaine   4% Patch 1 Patch Transdermal daily  lidocaine   4% Patch 2 Patch Transdermal daily  lidocaine   4% Patch 1 Patch Transdermal daily  lidocaine 2% Viscous 5 milliLiter(s) Mucosal two times a day  melatonin 1 milliGRAM(s) Oral at bedtime  melatonin 5 milliGRAM(s) Oral at bedtime  multivitamin/minerals/iron Oral Solution (CENTRUM) 15 milliLiter(s) Oral daily  nystatin Powder 1 Application(s) Topical every 8 hours  pantoprazole   Suspension 40 milliGRAM(s) Enteral Tube <User Schedule>  petrolatum Ophthalmic Ointment 1 Application(s) Both EYES four times a day  predniSONE  Solution 5 milliGRAM(s) Oral <User Schedule>  QUEtiapine 12.5 milliGRAM(s) Oral <User Schedule>  silver nitrate Applicator 1 Application(s) Topical once  simethicone 80 milliGRAM(s) Chew four times a day  zinc oxide 40% Paste 1 Application(s) Topical daily    MEDICATIONS  (PRN):  benzocaine 20% Spray 1 Spray(s) Topical four times a day PRN Cough  diclofenac sodium 1% Gel 2 Gram(s) Topical four times a day PRN pain  diphenhydrAMINE Elixir 25 milliGRAM(s) Oral every 6 hours PRN Rash and/or Itching  guaifenesin/dextromethorphan Oral Liquid 10 milliLiter(s) Oral every 6 hours PRN Cough  ketorolac   Injectable 15 milliGRAM(s) IV Push every 6 hours PRN Moderate Pain (4 - 6)  oxyCODONE    Solution 5 milliGRAM(s) Oral every 6 hours PRN Moderate Pain (4 - 6)  sodium chloride 0.65% Nasal 1 Spray(s) Both Nostrils every 6 hours PRN Nasal Congestion    Mode: AC/ CMV (Assist Control/ Continuous Mandatory Ventilation)  RR (machine): 12  TV (machine): 300  FiO2: 30  PEEP: 5  ITime: 1  MAP: 8  PIP: 25   Patient is a 74y old  Female who presents with a chief complaint of Bleeding from PEG (16 Jan 2024 16:49)    HPI:  74 y/o F with PMHx of T2DM, HTN, HLD, anemia, hypothyroidism, RA, fibromyalgia, remote cerebral aneurysm repair, acute hypercapnic respiratory failure now with tracheostomy, PEG tube, and bedbound (trach change 8/18, PEG change 8/14), sacral pressure ulcer, BIBEMS from home for 6 day hx of bleeding from the tracheostomy and PEG tube with associated abdominal pain. Patient still having regular bowel movements, last one occurred prior to ED arrival. Was seen outpatient on 10/26 by critical care Dr. Zaki Gottlieb and apparently had cultures sent at that time. Patient also noted to have chronic sacral decubitous ulcer. Family endorsed one episode of fever, though was not febrile on evaluation. Daughter noted patient was recently experiencing auditory and visual hallucinations (seeing animals that were not there & hearing a "bomb" going off outside her home), though patient not actively hallucinating on evaluation.  ED course notable for CT neck imaging showing no evidence of active bleeding around the tracheostomy site, no hematomas, and no drainable collection around the tracheostomy site. CT abdomen/pelvis notable for gastrostomy tube localized to the stomach with mild thickening noted along the tract; a sacral decubitus ulcer extending to the coccyx with osseous remodeling, possibly representing osteomyelitis; and bibasilar patchy opacities with volume loss in the lungs, representing atelectasis vs infection. Patient admitted for respiratory support, wound care of tracheostomy and PEG, and management of presumed sacral osteomyelitis.   (28 Oct 2023 04:18)    Interval Events: No issues overnight    REVIEW OF SYSTEMS:  [ ] Positive  [ x] All other systems negative  [ ] Unable to assess ROS because ________    Vital Signs Last 24 Hrs  T(C): 36.7 (01-17-24 @ 06:00), Max: 36.7 (01-16-24 @ 14:00)  T(F): 98.1 (01-17-24 @ 06:00), Max: 98.1 (01-17-24 @ 06:00)  HR: 72 (01-17-24 @ 06:10) (66 - 78)  BP: 140/62 (01-17-24 @ 06:00) (117/53 - 140/62)  RR: 16 (01-17-24 @ 06:00) (14 - 20)  SpO2: 100% (01-17-24 @ 06:10) (98% - 100%)    PHYSICAL EXAM:  HEENT:   [X]Tracheostomy: #8 distal XLT Shiley  [X]Pupils equal  [ ]No oral lesions  [X] Abnormal: missing dentition; +teeth caries; +loose bottom teeth.   small tongue laceration    SKIN  [ ]No Rash  [ ] Abnormal  [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    CARDIAC  [X]Regular  [ ]Abnormal    PULMONARY  [ ]Bilateral Clear Breath Sounds  [ ]Normal Excursion  [X]Abnormal - BL Coarse BS    GI  [X]PEG      [X] +BS		              [X]Soft, nondistended, nontender	  [ ]Abnormal    MUSCULOSKELETAL                                   [X]Bedbound                 [X] Abnormal: Pain with passive ROM of right shoulder, tender   [ ]Ambulatory/OOB to chair                           EXTREMITIES                                         [X]Normal  [ ]Edema                           NEUROLOGIC  [ ] Normal, non focal  [X] Focal findings: opens eyes spontaneously, follows commands on all 4 extremities    PSYCHIATRIC  [X]Alert and appropriate  [ ] Sedated	 [ ]Agitated    :  Mcbride: [ ] Yes, if yes: Date of Placement:                   [X] No    LINES: Central Lines [ ] Yes, if yes: Date of Placement                                     [X] No    HOSPITAL MEDICATIONS:  MEDICATIONS  (STANDING):  acetaminophen   Oral Liquid .. 545 milliGRAM(s) Oral every 6 hours  albuterol/ipratropium for Nebulization 3 milliLiter(s) Nebulizer every 8 hours  amLODIPine   Tablet 10 milliGRAM(s) Oral daily  artificial  tears Solution 2 Drop(s) Both EYES four times a day  ascorbic acid 500 milliGRAM(s) Oral daily  Biotene Dry Mouth Oral Rinse 5 milliLiter(s) Swish and Spit every 6 hours  calcium carbonate    500 mG (Tums) Chewable 1 Tablet(s) Chew every 12 hours  chlorhexidine 0.12% Liquid 15 milliLiter(s) Oral Mucosa every 12 hours  chlorhexidine 4% Liquid 1 Application(s) Topical <User Schedule>  dextrose 50% Injectable 12.5 Gram(s) IV Push once  dextrose 50% Injectable 25 Gram(s) IV Push once  escitalopram 10 milliGRAM(s) Oral <User Schedule>  fentaNYL   Patch  25 MICROgram(s)/Hr 1 Patch Transdermal every 72 hours  gabapentin Solution 250 milliGRAM(s) Enteral Tube at bedtime  gabapentin Solution 100 milliGRAM(s) Oral <User Schedule>  glucagon  Injectable 1 milliGRAM(s) IntraMuscular once  hemorrhoidal Ointment 1 Application(s) Rectal daily  hemorrhoidal Ointment      insulin glargine Injectable (LANTUS) 19 Unit(s) SubCutaneous <User Schedule>  insulin lispro (ADMELOG) corrective regimen sliding scale   SubCutaneous every 6 hours  insulin regular  human recombinant 34 Unit(s) SubCutaneous <User Schedule>  insulin regular  human recombinant 9 Unit(s) SubCutaneous <User Schedule>  insulin regular  human recombinant 17 Unit(s) SubCutaneous <User Schedule>  levothyroxine 125 MICROGram(s) Oral <User Schedule>  lidocaine   4% Patch 1 Patch Transdermal daily  lidocaine   4% Patch 2 Patch Transdermal daily  lidocaine   4% Patch 1 Patch Transdermal daily  lidocaine 2% Viscous 5 milliLiter(s) Mucosal two times a day  melatonin 1 milliGRAM(s) Oral at bedtime  melatonin 5 milliGRAM(s) Oral at bedtime  multivitamin/minerals/iron Oral Solution (CENTRUM) 15 milliLiter(s) Oral daily  nystatin Powder 1 Application(s) Topical every 8 hours  pantoprazole   Suspension 40 milliGRAM(s) Enteral Tube <User Schedule>  petrolatum Ophthalmic Ointment 1 Application(s) Both EYES four times a day  predniSONE  Solution 5 milliGRAM(s) Oral <User Schedule>  QUEtiapine 12.5 milliGRAM(s) Oral <User Schedule>  silver nitrate Applicator 1 Application(s) Topical once  simethicone 80 milliGRAM(s) Chew four times a day  zinc oxide 40% Paste 1 Application(s) Topical daily    MEDICATIONS  (PRN):  benzocaine 20% Spray 1 Spray(s) Topical four times a day PRN Cough  diclofenac sodium 1% Gel 2 Gram(s) Topical four times a day PRN pain  diphenhydrAMINE Elixir 25 milliGRAM(s) Oral every 6 hours PRN Rash and/or Itching  guaifenesin/dextromethorphan Oral Liquid 10 milliLiter(s) Oral every 6 hours PRN Cough  ketorolac   Injectable 15 milliGRAM(s) IV Push every 6 hours PRN Moderate Pain (4 - 6)  oxyCODONE    Solution 5 milliGRAM(s) Oral every 6 hours PRN Moderate Pain (4 - 6)  sodium chloride 0.65% Nasal 1 Spray(s) Both Nostrils every 6 hours PRN Nasal Congestion    Mode: AC/ CMV (Assist Control/ Continuous Mandatory Ventilation)  RR (machine): 12  TV (machine): 300  FiO2: 30  PEEP: 5  ITime: 1  MAP: 8  PIP: 25

## 2024-01-23 ENCOUNTER — TRANSCRIPTION ENCOUNTER (OUTPATIENT)
Age: 74
End: 2024-01-23

## 2024-01-25 ENCOUNTER — TRANSCRIPTION ENCOUNTER (OUTPATIENT)
Age: 74
End: 2024-01-25

## 2024-01-26 ENCOUNTER — TRANSCRIPTION ENCOUNTER (OUTPATIENT)
Age: 74
End: 2024-01-26

## 2024-01-26 ENCOUNTER — APPOINTMENT (OUTPATIENT)
Dept: CARE COORDINATION | Facility: HOME HEALTH | Age: 74
End: 2024-01-26

## 2024-01-26 ENCOUNTER — APPOINTMENT (OUTPATIENT)
Dept: CARE COORDINATION | Facility: HOME HEALTH | Age: 74
End: 2024-01-26
Payer: MEDICARE

## 2024-01-26 VITALS
RESPIRATION RATE: 12 BRPM | HEART RATE: 82 BPM | DIASTOLIC BLOOD PRESSURE: 60 MMHG | SYSTOLIC BLOOD PRESSURE: 130 MMHG | TEMPERATURE: 97.8 F | OXYGEN SATURATION: 100 %

## 2024-01-26 PROBLEM — I10 ESSENTIAL (PRIMARY) HYPERTENSION: Chronic | Status: ACTIVE | Noted: 2023-10-28

## 2024-01-26 PROCEDURE — 99495 TRANSJ CARE MGMT MOD F2F 14D: CPT

## 2024-01-28 NOTE — PHYSICAL EXAM
[No Acute Distress] : no acute distress [Well Developed] : well developed [Well Nourished] : well nourished [Normal Rate] : normal rate  [Regular Rhythm] : with a regular rhythm [Pedal Pulses Present] : the pedal pulses are present [No Edema] : there was no peripheral edema [Soft] : abdomen soft [Non Tender] : non-tender [Non-distended] : non-distended [Normal Bowel Sounds] : normal bowel sounds [de-identified] : pt has ventilator with expiratory rhonchi  [de-identified] : feeding tube, incontinent [de-identified] : LUE contracture [de-identified] : sacral wound, dressing intact [de-identified] : bed bound left hemiplegia? [de-identified] : able to speak over vent denies depression

## 2024-01-28 NOTE — REVIEW OF SYSTEMS
[Cough] : cough [Diarrhea] : diarrhea [Incontinence] : incontinence [Joint Pain] : joint pain [Muscle Weakness] : muscle weakness [Fatigue] : fatigue [Hot Flashes] : hot flashes [Orthopnea] : orthopnea [Easy Bleeding] : easy bleeding [Chest Pain] : no chest pain [Lower Ext Edema] : no lower extremity edema [Shortness Of Breath] : no shortness of breath [Wheezing] : no wheezing [Dyspnea on Exertion] : no dyspnea on exertion [Constipation] : no constipation [Melena] : no melena [Suicidal] : not suicidal [Insomnia] : no insomnia [Anxiety] : no anxiety [Depression] : no depression [Swollen Glands] : no swollen glands [FreeTextEntry3] : dry eye syndrome  [FreeTextEntry4] : trach to vent [FreeTextEntry6] : requires suctioning [FreeTextEntry7] : incontinent [FreeTextEntry9] : both shoulders [de-identified] : some auditory and visual hallucinations

## 2024-01-28 NOTE — HISTORY OF PRESENT ILLNESS
[Post-hospitalization from ___ Hospital] : Post-hospitalization from [unfilled] Hospital [Admitted on: ___] : The patient was admitted on [unfilled] [Discharged on ___] : discharged on [unfilled] [Discharge Summary] : discharge summary [Discharge Med List] : discharge medication list [Med Reconciliation] : medication reconciliation has been completed [Patient Contacted By: ____] : and contacted by [unfilled] [FreeTextEntry2] : Ms. Woods is a 74 year old woman with PMHx of T2DM, HTN, HLD, anemia, hypothyroidism, RA, fibromyalgia, remote cerebral aneurysm repair, acute hypercapnic respiratory failure now with tracheostomy, PEG tube, and bedbound (trach change 8/18, PEG change 8/14), sacral pressure ulcer, BIBEMS from home for 6 day hx of bleeding from the tracheostomy and PEG tube with associated abdominal pain, recent history of auditory and visual hallucinations, and with chronic sacral decubitus ulcer. Exam notable for mild abdominal distention with LLQ and RLQ tenderness, tracheostomy in place with no obvious bleeding, PEG tube with scant amount of dried blood, at baseline neurologic status. Imaging showing no active bleeding around tracheostomy site, however was notable for mild thickening along PEG tube tract, sacral ulcer extending to the coccyx suggestive of possible osteomyelitis, and bibasilar patchy lung opacities with volume loss. Patient admitted for respiratory support, wound care of tracheostomy and PEG, and management of sacral osteomyelitis. No further Trach and peg site bleeding noted since admission.  Pelvic MRI imaging also suggestive of Acute OM. Patient placed on long-term antibiotics with Infectious disease guidance.  Additionally treated with a 7d course of Cefepime due to PSA and Serratia tracheitis on 11/8-->11/15 and then resumed cipro/keflex.  Hospital course c/b Delirium for which Seroquel was added and home Lexapro continued. Tracheostomy changed to #9 Cuffed [place] by ENT 11/3.  Despite trach change patient remained with persistent air leak.  On 11/19, the trach was again changed due to leak and loss of volumes on vent.  Trach was changed to #8 distal cuffed Shiley.  Patient seen by Dermatology for back lesions.  One diagnosed as a seborrheic keratitis and the other a dermatofibroma.  Intermittent abdominal pain throughout hospital course, at times relieved with gas/BM, w/u negative, seen by GI - no recs.  12/10 pt completed cipro and keflex for osteo treatment.  12/13 moderate amounts of secretions w/ blood - tranexamic acid neb given with notable improvement.  12/18 with blood tinged secretion again - TXA 250mg neb x2 doses.  12/19 spiked fever to 102 - pancultured, negative w/u.  12/19 silver Nitrate applied to PEG site by GI for leakage.  PEG site with mild leakage however nothing else to be done and maintained with single gauze with surrounding skin intact.  Pt has since remained medically stable.  1/7-1/8 Generalized pain, starting in forearm and BL shoulders in which an xray was performed and was negative for acute fractures/dislocation. doppler negative for DVT.  Started on 5 day prednisone taper to address rheumatologic/inflammatory factors given history of rheumatoid arthritis and fibromyalgia.    75 y/o female seen today for TCM visit, daughter Marysol is present along with pt  and  present.  Pt is awake, alert appears to be in no acute distress, able to speak over vent as per daughter. She has trach to vent, feeding tube, in hospital bed. She has intermittent hallucinations and is able to follow instructions and participate in care. [facility] services provided by [facility] and ALWAYS provides LPN services, currently approved for 24/7 LPN coverage.  Full coverage not available at this time as per daughter, she has some LPN coverage with more pending next week.  Daughter states Dr. Gottlieb saw patient in the home and ordered labs. Call to Dr. Gottlieb to confirm, he confirmed lab request. Call to [facility] spoke with Justin to inquire about what was needed to arrange for home lab draw, rx with orders and frequency of collection. Call to Dr. Gottlieb, informed him of need for Rx and where to fax Rx. Daughter and  are aware. TCM education provided and yellow card left in the home.

## 2024-01-28 NOTE — HEALTH RISK ASSESSMENT
[No] : In the past 12 months have you used drugs other than those required for medical reasons? No [Full assistance needed] : managing finances [Designated Healthcare Proxy] : Designated healthcare proxy [Name: ___] : Health Care Proxy's Name: [unfilled]  [Relationship: ___] : Relationship: [unfilled] [DNR] : DNR [Last Verification Date: ___] : Last Verification Date: [unfilled] [I will adhere to the patient's wishes.] : I will adhere to the patient's wishes. [Time Spent: ___ minutes] : Time Spent: [unfilled] minutes [Never] : Never [Little interest or pleasure doing things] : 1) Little interest or pleasure doing things [Feeling down, depressed, or hopeless] : 2) Feeling down, depressed, or hopeless [0] : 2) Feeling down, depressed, or hopeless: Not at all (0) [PHQ-2 Negative - No further assessment needed] : PHQ-2 Negative - No further assessment needed [de-identified] : bed  bound, Passive ROM  [de-identified] : Tube feedings [[id number]] : 0 [] :  [FreeTextEntry8] : bed bound [de-identified] : continues with visual and auditory hallucinations [With Patient/Caregiver] : , with patient/caregiver [AdvancecareDate] : 01/24 [FreeTextEntry4] : MOLST done in hospital at last admission 1/12/24. Asked daughter about Palliative care discussion, states they would like to manage at home and avoid hospital admission if possible, she spoke with Dr Gottlieb during home visit and they will continue to evaluate as needed.  states he will discuss with daughter.

## 2024-01-29 ENCOUNTER — TRANSCRIPTION ENCOUNTER (OUTPATIENT)
Age: 74
End: 2024-01-29

## 2024-01-29 LAB
CULTURE RESULTS: SIGNIFICANT CHANGE UP
SPECIMEN SOURCE: SIGNIFICANT CHANGE UP

## 2024-01-30 ENCOUNTER — TRANSCRIPTION ENCOUNTER (OUTPATIENT)
Age: 74
End: 2024-01-30

## 2024-01-30 NOTE — PHYSICAL THERAPY INITIAL EVALUATION ADULT - DIAGNOSIS, PT EVAL
Over-distended uterus: Multiple gestation, polyhydramnios, Macrosomia Decreased functional mobility/capacity secondary to impairments listed below

## 2024-02-06 ENCOUNTER — TRANSCRIPTION ENCOUNTER (OUTPATIENT)
Age: 74
End: 2024-02-06

## 2024-02-07 ENCOUNTER — TRANSCRIPTION ENCOUNTER (OUTPATIENT)
Age: 74
End: 2024-02-07

## 2024-02-08 ENCOUNTER — TRANSCRIPTION ENCOUNTER (OUTPATIENT)
Age: 74
End: 2024-02-08

## 2024-02-09 ENCOUNTER — APPOINTMENT (OUTPATIENT)
Dept: CARE COORDINATION | Facility: HOME HEALTH | Age: 74
End: 2024-02-09
Payer: MEDICARE

## 2024-02-09 VITALS
TEMPERATURE: 98.4 F | RESPIRATION RATE: 18 BRPM | HEART RATE: 78 BPM | OXYGEN SATURATION: 100 % | DIASTOLIC BLOOD PRESSURE: 61 MMHG | SYSTOLIC BLOOD PRESSURE: 136 MMHG

## 2024-02-09 PROCEDURE — 99349 HOME/RES VST EST MOD MDM 40: CPT

## 2024-02-12 ENCOUNTER — APPOINTMENT (OUTPATIENT)
Dept: ENDOCRINOLOGY | Facility: CLINIC | Age: 74
End: 2024-02-12
Payer: MEDICARE

## 2024-02-12 ENCOUNTER — TRANSCRIPTION ENCOUNTER (OUTPATIENT)
Age: 74
End: 2024-02-12

## 2024-02-12 ENCOUNTER — EMERGENCY (EMERGENCY)
Facility: HOSPITAL | Age: 74
LOS: 1 days | Discharge: HOME | End: 2024-02-12
Payer: MEDICARE

## 2024-02-12 ENCOUNTER — NON-APPOINTMENT (OUTPATIENT)
Age: 74
End: 2024-02-12

## 2024-02-12 VITALS — OXYGEN SATURATION: 100 % | HEART RATE: 77 BPM

## 2024-02-12 VITALS
RESPIRATION RATE: 18 BRPM | HEART RATE: 86 BPM | HEIGHT: 59 IN | SYSTOLIC BLOOD PRESSURE: 139 MMHG | OXYGEN SATURATION: 100 % | TEMPERATURE: 98 F | DIASTOLIC BLOOD PRESSURE: 72 MMHG | WEIGHT: 119.93 LBS

## 2024-02-12 DIAGNOSIS — Z98.890 OTHER SPECIFIED POSTPROCEDURAL STATES: Chronic | ICD-10-CM

## 2024-02-12 DIAGNOSIS — Z93.1 GASTROSTOMY STATUS: Chronic | ICD-10-CM

## 2024-02-12 PROCEDURE — 99284 EMERGENCY DEPT VISIT MOD MDM: CPT | Mod: 25

## 2024-02-12 PROCEDURE — 82962 GLUCOSE BLOOD TEST: CPT

## 2024-02-12 PROCEDURE — 49465 FLUORO EXAM OF G/COLON TUBE: CPT

## 2024-02-12 PROCEDURE — 99215 OFFICE O/P EST HI 40 MIN: CPT

## 2024-02-12 PROCEDURE — 43762 RPLC GTUBE NO REVJ TRC: CPT

## 2024-02-12 PROCEDURE — 94002 VENT MGMT INPAT INIT DAY: CPT

## 2024-02-12 PROCEDURE — L8699: CPT

## 2024-02-12 RX ORDER — CHLORHEXIDINE GLUCONATE 213 G/1000ML
15 SOLUTION TOPICAL EVERY 12 HOURS
Refills: 0 | Status: DISCONTINUED | OUTPATIENT
Start: 2024-02-12 | End: 2024-02-16

## 2024-02-12 RX ADMIN — CHLORHEXIDINE GLUCONATE 15 MILLILITER(S): 213 SOLUTION TOPICAL at 18:03

## 2024-02-12 NOTE — PHYSICAL EXAM
[No Acute Distress] : no acute distress [No Respiratory Distress] : no respiratory distress  [Normal Rate] : normal rate  [Regular Rhythm] : with a regular rhythm [Soft] : abdomen soft [Normal Bowel Sounds] : normal bowel sounds [de-identified] : expiratory rhonchi, vent dependent via trach [de-identified] : BLE edema [de-identified] : feeding tube [de-identified] : foot drop [de-identified] : stage 2 to sacrum [de-identified] : bed bound, upper and lower extremity contractures, [de-identified] : arousable

## 2024-02-12 NOTE — ED ADULT NURSE NOTE - OBJECTIVE STATEMENT
74 year old female pt presented to the ED via ems for leaking peg tube, pt is trach dependant, daughter Keron at bedside states Peg leaking, pt is bed bound

## 2024-02-12 NOTE — REVIEW OF SYSTEMS
[Fatigue] : fatigue [Diarrhea] : diarrhea [Incontinence] : incontinence [Joint Stiffness] : joint stiffness [FreeTextEntry6] : vent dependent [FreeTextEntry7] : C-diff [FreeTextEntry9] : contractures [de-identified] : sacral ulcer [de-identified] : visual hallucinations

## 2024-02-12 NOTE — HEALTH RISK ASSESSMENT
[No] : No [Medical reason not done] : Medical reason not done [No falls in past year] : Patient reported no falls in the past year [With Family] : lives with family [] :  [Full assistance needed] : managing finances [Designated Healthcare Proxy] : Designated healthcare proxy [With Patient/Caregiver] : , with patient/caregiver [Aggressive treatment] : aggressive treatment [Relationship: ___] : Relationship: [unfilled] [I will adhere to the patient's wishes.] : I will adhere to the patient's wishes. [Time Spent: ___ minutes] : Time Spent: [unfilled] minutes [Never] : Never [de-identified] : PEG [de-identified] : Pt is non verbal. [FreeTextEntry1] : feeding tube [AdvancecareDate] : 02/24 [FreeTextEntry4] : Dr Gottlieb has discussed hospice at last visit. Discussed with daughter during my visit. Daughter does not want hospice at this time.

## 2024-02-12 NOTE — ED ADULT NURSE NOTE - NSFALLRISKINTERV_ED_ALL_ED
Assistance OOB with selected safe patient handling equipment if applicable/Assistance with ambulation/Communicate fall risk and risk factors to all staff, patient, and family/Monitor gait and stability/Provide visual cue: yellow wristband, yellow gown, etc/Reinforce activity limits and safety measures with patient and family/Call bell, personal items and telephone in reach/Instruct patient to call for assistance before getting out of bed/chair/stretcher/Non-slip footwear applied when patient is off stretcher/[place] to call system/Physically safe environment - no spills, clutter or unnecessary equipment/Purposeful Proactive Rounding/Room/bathroom lighting operational, light cord in reach

## 2024-02-12 NOTE — ED PROVIDER NOTE - PHYSICAL EXAMINATION
GEN: NAD. Non-toxic appearing.  HEENT: normocephalic, atraumatic, EOMI, PERRL, no conjuntival pallor, moist MM  CARDIAC: RRR, S1, S2, no murmur. Radial pulses and symmetric b/l  PULM: trach dependent   ABD: PEG tube in RLQ with no surrounding erythema or tenderness  MSK: bedbound, no edema  NEURO: gait normal, no focal neurological deficits, CN 2-12 grossly intact  SKIN: warm, dry, no rash.

## 2024-02-12 NOTE — ED ADULT NURSE REASSESSMENT NOTE - NS ED NURSE REASSESS COMMENT FT1
Respiratory at bedside to switch patient over to portable ventilator. Nonemergent prepared patient for discharge. PT changed and cleaned. PT family member endorsed no further questions regarding discharge at present time.

## 2024-02-12 NOTE — ED PROVIDER NOTE - PATIENT PORTAL LINK FT
You can access the FollowMyHealth Patient Portal offered by [facility] by registering at the following website: [email or web address]. By joining [employer]’s FollowMyHealth portal, you will also be able to view your health information using other applications (apps) compatible with our system.

## 2024-02-12 NOTE — ED ADULT NURSE REASSESSMENT NOTE - NS ED NURSE REASSESS COMMENT FT1
Patient daughter request rectal tube for stool Management with Cdiff and pressure ulcers. MD Carol garcia and MD Jiménez placed order for patient to received rectal tube prior to dc and for patient to be dc'd with tube home. Prior to placement Rn spoke with daughter regarding management of rectal tube. Daughter states she has previous experience with the rectal tube from a previous admission. Rn made daughter aware that we do not have replacement bags to give her and they are not reusable. Daughter stated she understood and that she would either put the tube into a diaper to allow it to drain or a bucket if the bag filled up before she was able to get replacement bags. Daughter stated she had resources including case management and home care Rn/ LPN that would be able to assist her with bag changes/ getting replacement bags.  Rectal tube placed at this time balloon inflated with no pain noted from patient. Liquid stool draining into collection bag. Patient daughter request rectal tube for stool Management with Cdiff and pressure ulcers. MD Carol garcia and MD Jiménez placed order for patient to received rectal tube prior to dc and for patient to be dc'd with tube home. Prior to placement Rn spoke with daughter regarding management of rectal tube. Daughter states she has previous experience with the rectal tube from a previous admission. Rn made daughter aware that we do not have replacement bags to give her and they are not reusable. Daughter stated she understood and that she would either put the tube into a diaper to allow it to drain or a bucket if the bag filled up before she was able to get replacement bags. Daughter stated she had resources including case management and home care Rn/ LPN that would be able to assist her with bag changes/ getting replacement bags. Rectal tube placed at this time balloon inflated with no pain noted from patient. Liquid stool draining into collection bag. Daughter is aware of plan of care and in agreement. Daughter of patient had no fiurther questions at this time. Patient daughter request rectal tube for stool Management with Cdiff and pressure ulcers. MD Carol garcia and MD Jiménez placed order for patient to received rectal tube prior to dc and for patient to be dc'd with tube home. Prior to placement Rn spoke with daughter regarding management of rectal tube. Daughter states she has previous experience with the rectal tube from a previous admission. Rn made daughter aware that we do not have replacement bags to give her and they are not reusable. Daughter stated she understood and that she would either put the tube into a diaper to allow it to drain or a bucket if the bag filled up before she was able to get replacement bags. Daughter stated she had resources including case management and home care Rn/ LPN that would be able to assist her with bag changes/ getting replacement bags.

## 2024-02-12 NOTE — ASSESSMENT
[FreeTextEntry1] : #Type 2 DM - patient is currently on tube feeds which was recently adjusted since patient is having active diarrhea secondary c-diff  - Patient presents as a hospital follow up at this time. In the hospital patient was getting Lantus 19 units along with regular insulin 34/15/12 units at 6 AM, 12 PM, 6 PM respectively.. -Due to patient having active diarrhea, her dose was recently adjusted to Lantus 15 units every morning along with 15/15/6. -Blood sugars have been variable due to switching tube feeds at time.  Currently patient is running at 24-hour continuous tube feeds at 40 cc an hour Plan: -For now, continue with Lantus 15 units every morning -Continue with regular insulin 15/15/6. -We spoke about that if tube feeds are held for any reason, hold regular insulin.  If blood glucose is under 100, hold regular insulin.  If blood glucose is under 70 persistently, let us know to further adjust regimen -Patient needs to establish care with GI to help manage tube feeds   # Secondary adrenal insufficiency. - Due to chronic steroid use  - c/w prednisone 5mg daily   # Hypothyroidism. - continue levothyroxine 125mcg daily

## 2024-02-12 NOTE — HISTORY OF PRESENT ILLNESS
[FreeTextEntry1] : This visit was provided via telehealth using real-time 2-way audio visual technology. The patient, MAXX WOODS was located at home, [address] [place] [address]. [place] [address] at the time of the visit.  The provider, NANCY ALONSO DO, was located at the medical office located in [address] [place] [address] [address] [facility], [place] [address] at the time of the visit. The patient, MAXX WOODS and Provider participated in the telehealth encounter along with daughter, Marysol Woods.  Verbal consent given on FEBRUARY 12, 2024 by Patient's daughter, Marysol.   75 y/o F with complex medical history with a PMHx of T2DM, HTN, HLD, anemia, hypothyroidism, RA, fibromyalgia, remote cerebral aneurysm repair, acute hypercapnic respiratory failure now with tracheostomy, PEG tube, and bedbound (trach change 8/18, PEG change 8/14), sacral pressure ulcer. Patient was recently in the hospital for 6 day hx of bleeding from the tracheostomy and PEG tube with associated abdominal pain. Endocrinology was consulted for hyperglycemia   Spoke with patient's daughter Marysol who reporst DM2 for 20 years, thought to be due to longterm high doses of prednisone for treatment of rheumatoid arthritis Tube feeds have noted to be changing with increases in volume from 50ml/hr to 65ml/hr and also switching form [employer] Peptide to [employer] glucose support 18 hours per day.  Patient is currently on 40ml/hr due to recent c-diff infection, rate was lowered by their pulmonologist.   Daughter reports no known microvascular complications Patients home insulin regimen prior to coming to this hospital was levemir 14 units daily and metformin 500mg BID PAtient has been mostly hospitalized for the last 1 year and daughter reports varying insulin regimens and glucose control.  7a- 152  yesterday 6pm- 187  2pm - 209  noon- 136 (held) 9am- 101 (held   6pm - 149  noon - 129  6am- 141   6pm - 174  noon- 61 (given orange juice)  am- 126   Of note, patient is on Lt4 125mcg daily

## 2024-02-12 NOTE — HISTORY OF PRESENT ILLNESS
[Post-hospitalization from ___ Hospital] : Post-hospitalization from [unfilled] Hospital [Admitted on: ___] : The patient was admitted on [unfilled] [Discharged on ___] : discharged on [unfilled] [FreeTextEntry2] : 73 y/o female for f/u home visit. She was seen on 1/26/24 for post hospital evaluation at that time she was admitted for  bleeding from trach and PEG site and respiratory support.  She is vent dependent, with feeding tube, contractures of upper and lower extremities, daughter and  present along with LPN  Recently tested positive for C-diff, had reaction to flagyl. Alternative medication dificide pending, PO Vancomycin not covered as per daughter.  as per cristi, pt had visit from [facility] for evaluation for pain mgmt. Daughter is not interested in hospice at this time. pt lethargic FS 61mg/dl 4 oz orange juice with sugar via feeding tube, minor increase in mental status, responds to tactile stimuli only opening eyes, does not respond to verbal.   Call to Dr. Gottlieb voice mail left to review labs as H/H low.   Dr Gottlieb returned call and states he is aware of labs and had conversation with family yesterday regarding hospice, quality of life. Family does not wish to pursue hospice at this time. As per family they do not want her to go back to ED. At this time family is looking for services to conduct at home subacute care, IV fluid, labs, wound care. Discussed with daughter that she can discuss her needs with [facility] and her current [facility].  She has been advised that TCM services will be ending soon. Daughter verbalized understanding.

## 2024-02-12 NOTE — COUNSELING
[Fall prevention counseling provided] : Fall prevention counseling provided [Adequate lighting] : Adequate lighting [No throw rugs] : No throw rugs [Use proper foot wear] : Use proper foot wear [FreeTextEntry1] : pt is bed bound

## 2024-02-12 NOTE — ED PROVIDER NOTE - ATTENDING CONTRIBUTION TO CARE
Emergency Medicine Attending MD Medina:  patient seen and evaluated with the resident.  I was present for key portions of the History & Physical, and I agree with the Impression & Plan.    Patient is a 74-year-old female, brought in by EMS from home for evaluation of leaking G-tube.  Patient has been G-tube dependent for years; this G-tube was placed in August.    +Active Cdiff diarrhea, on PO vancomycin at home    MHx:  T2DM, HTN, HLD, anemia, hypothyroidism, RA, fibromyalgia, remote cerebral aneurysm repair, acute hypercapnic respiratory failure now with tracheostomy, PEG tube, and bedbound (trach change 8/18, PEG change 8/14), sacral pressure ulcer.    Patient was admitted 10/23-11/7/23 for multiple problems; long stay at rehab; dc'd to [facility] mid-January.    Followed by pulmonolgy, ID, wound care, orthopedics    vs: WNL  General: Adult female, chronically debilitated appearing: Trached PEG'd  Neck: Trach site clean    Abdomen: Soft nontender, PEG tube site clean and dry, PEG tube leaking around access sites. Emergency Medicine Attending MD Medina:  patient seen and evaluated with the resident.  I was present for key portions of the History & Physical, and I agree with the Impression & Plan.    Patient is a 74-year-old female, brought in by EMS from home for evaluation of leaking G-tube.  Patient has been G-tube dependent for years; this G-tube was placed in August.    +Active Cdiff diarrhea, on PO vancomycin at home    MHx:  T2DM, HTN, HLD, anemia, hypothyroidism, RA, fibromyalgia, remote cerebral aneurysm repair, acute hypercapnic respiratory failure now with tracheostomy, PEG tube, and bedbound (trach change 8/18, PEG change 8/14), sacral pressure ulcer.    Patient was admitted 10/23-11/7/23 for multiple problems; long stay at rehab; dc'd to [facility] mid-January.    Followed by pulmonolgy, ID, wound care, orthopedics    vs: WNL  General: Adult female, chronically debilitated appearing: Trached PEG'd  Neck: Trach site clean  Lungs: No distress   neuro: Awake, opens eyes to voice  Abdomen: Soft nontender, PEG tube site clean and dry, PEG tube leaking around access sites.    MDM   HPI most consistent with feeding tube malfunction.  Easily replaced at the bedside.  Tube placement confirmed with bedside Gastrografin abdominal x-ray.    Based upon the chief complaint and overall appearance of the patient, vital signs, physical exam, neither labs nor further investigative imaging indicated at this time.  Plan to DC home nonemergent ambulance.

## 2024-02-12 NOTE — CURRENT MEDS
[Takes medication as prescribed] : takes [None] : Patient does not have any barriers to medication adherence [Yes] : Reviewed medication list for presence of high-risk medications. [FreeTextEntry1] : PEG tube

## 2024-02-12 NOTE — ED PROVIDER NOTE - CLINICAL SUMMARY MEDICAL DECISION MAKING FREE TEXT BOX
T2DM, HTN, HLD, anemia, hypothyroidism, RA, fibromyalgia, remote cerebral aneurysm repair, acute hypercapnic respiratory failure now with tracheostomy, PEG tube (initally placed 2022), and bedbound (trach change 8/18, PEG change 8/14/2023), presents with sister due to tear in PEG tube. Denies fevers, pain, erythema or discharge. 20Fr PEG tube in RLQ with small tear near distal end, no surrounding erythema or induration or tenderness. Low concern for infection at this time. Will replace PEG tube, likely discharge.

## 2024-02-12 NOTE — ED PROVIDER NOTE - NSFOLLOWUPINSTRUCTIONS_ED_ALL_ED_FT
You were seen in the ED today for a leaking PEG tube. It was replaced with a 20Fr tube.    Please follow up with your primary doctor.    Return to the ED if you develop issues with feeding through your new PEG tube.

## 2024-02-12 NOTE — ED PROVIDER NOTE - PROGRESS NOTE DETAILS
PEG tube replaced. CXR performed with contrast in stomach. To be discharged with nonemergent.  -Gabriel Jiménez PGY-2

## 2024-02-12 NOTE — ED ADULT NURSE REASSESSMENT NOTE - NS ED NURSE REASSESS COMMENT FT1
Rectal tube placed at this time. Tube lubricated with lubricant and inserted into rectum slowly. Once in place balloon inflated with no resistance felt and no pain noted from patient. Liquid stool draining into collection bag. Bag placed below the level of the patient. Family member/ ems who is at bedside give safety instructions for tube to prevent injury/harm. Daughter is aware of plan of care and in agreement. Daughter of patient had no further questions at this time. Rectal tube placed at this time. Perineum care completed/ patient cleaned. Tube lubricated with lubricant and inserted into rectum slowly. Once in place balloon inflated with no resistance felt and no pain noted from patient. Liquid stool draining into collection bag. Bag placed below the level of the patient. Wound care completed, sit cleaned, pressure dressing applied to wound, and moisture barrier cream applied to skin. Family member/ ems who is at bedside give safety instructions for tube to prevent injury/harm. Daughter is aware of plan of care and in agreement. Daughter of patient had no further questions at this time.

## 2024-02-12 NOTE — ED PROCEDURE NOTE - ATTENDING CONTRIBUTION TO CARE
EM attending MD Medina:  patient seen and evaluated with the resident.  I agree with the above procedure note.

## 2024-02-13 ENCOUNTER — TRANSCRIPTION ENCOUNTER (OUTPATIENT)
Age: 74
End: 2024-02-13

## 2024-02-13 NOTE — ED POST DISCHARGE NOTE - ADDITIONAL DOCUMENTATION
5:45pm - spoke with daughter again and she expressed again that she doesn't want to bring pt back to the ER even with associated risks of bowel perforation. She explained that pt's pulmonologist / family friend whitley vazquez recommended that patient have a rectal tube placed. I explained to pt the ED could not manage the rectal tube remotely and that we are recommended coming back in for removal. pt's daughter understands but declines and says he will speak to the pt's private doctor.

## 2024-02-13 NOTE — ED POST DISCHARGE NOTE - REASON FOR FOLLOW-UP
Pt daughter called to complain that her mother was dced with rectal tube but no instructions on how to use it and no additional bags. I advised pt to be returned to ED but daughter countered that given snow and the resources needed to transport the pt, that was not possible. Alternative offered to have daughter present to the ED, not register, and receive education from triage nurse or nursing leadership. RN leadership, daughter, and I agreed to this plan. Daughter expected at 3pm 2/14/24. -Dr Ibarra Other

## 2024-02-16 ENCOUNTER — TRANSCRIPTION ENCOUNTER (OUTPATIENT)
Age: 74
End: 2024-02-16

## 2024-02-19 ENCOUNTER — EMERGENCY (EMERGENCY)
Facility: HOSPITAL | Age: 74
LOS: 1 days | Discharge: HOME | End: 2024-02-19
Attending: EMERGENCY MEDICINE
Payer: MEDICARE

## 2024-02-19 ENCOUNTER — TRANSCRIPTION ENCOUNTER (OUTPATIENT)
Age: 74
End: 2024-02-19

## 2024-02-19 VITALS
WEIGHT: 175.05 LBS | RESPIRATION RATE: 20 BRPM | HEART RATE: 79 BPM | DIASTOLIC BLOOD PRESSURE: 61 MMHG | TEMPERATURE: 98 F | SYSTOLIC BLOOD PRESSURE: 126 MMHG | HEIGHT: 59 IN | OXYGEN SATURATION: 100 %

## 2024-02-19 DIAGNOSIS — Z93.1 GASTROSTOMY STATUS: Chronic | ICD-10-CM

## 2024-02-19 DIAGNOSIS — Z93.0 TRACHEOSTOMY STATUS: ICD-10-CM

## 2024-02-19 DIAGNOSIS — Z98.890 OTHER SPECIFIED POSTPROCEDURAL STATES: Chronic | ICD-10-CM

## 2024-02-19 LAB
ALBUMIN SERPL ELPH-MCNC: 3.6 G/DL — SIGNIFICANT CHANGE UP (ref 3.3–5)
ALP SERPL-CCNC: 73 U/L — SIGNIFICANT CHANGE UP (ref 40–120)
ALT FLD-CCNC: 24 U/L — SIGNIFICANT CHANGE UP (ref 10–45)
ANION GAP SERPL CALC-SCNC: 9 MMOL/L — SIGNIFICANT CHANGE UP (ref 5–17)
AST SERPL-CCNC: 28 U/L — SIGNIFICANT CHANGE UP (ref 10–40)
BASE EXCESS BLDV CALC-SCNC: 5.3 MMOL/L — HIGH (ref -2–3)
BASOPHILS # BLD AUTO: 0.08 K/UL — SIGNIFICANT CHANGE UP (ref 0–0.2)
BASOPHILS NFR BLD AUTO: 0.9 % — SIGNIFICANT CHANGE UP (ref 0–2)
BILIRUB SERPL-MCNC: 0.3 MG/DL — SIGNIFICANT CHANGE UP (ref 0.2–1.2)
BUN SERPL-MCNC: 29 MG/DL — HIGH (ref 7–23)
CA-I SERPL-SCNC: 1.28 MMOL/L — SIGNIFICANT CHANGE UP (ref 1.15–1.33)
CALCIUM SERPL-MCNC: 9.7 MG/DL — SIGNIFICANT CHANGE UP (ref 8.4–10.5)
CHLORIDE BLDV-SCNC: 98 MMOL/L — SIGNIFICANT CHANGE UP (ref 96–108)
CHLORIDE SERPL-SCNC: 96 MMOL/L — SIGNIFICANT CHANGE UP (ref 96–108)
CO2 BLDV-SCNC: 33 MMOL/L — HIGH (ref 22–26)
CO2 SERPL-SCNC: 27 MMOL/L — SIGNIFICANT CHANGE UP (ref 22–31)
CREAT SERPL-MCNC: <0.3 MG/DL — LOW (ref 0.5–1.3)
EGFR: 113 ML/MIN/1.73M2 — SIGNIFICANT CHANGE UP
EOSINOPHIL # BLD AUTO: 0 K/UL — SIGNIFICANT CHANGE UP (ref 0–0.5)
EOSINOPHIL NFR BLD AUTO: 0 % — SIGNIFICANT CHANGE UP (ref 0–6)
FLUAV AG NPH QL: SIGNIFICANT CHANGE UP
FLUBV AG NPH QL: SIGNIFICANT CHANGE UP
GAS PNL BLDV: 132 MMOL/L — LOW (ref 136–145)
GAS PNL BLDV: SIGNIFICANT CHANGE UP
GLUCOSE BLDV-MCNC: 189 MG/DL — HIGH (ref 70–99)
GLUCOSE SERPL-MCNC: 199 MG/DL — HIGH (ref 70–99)
HCO3 BLDV-SCNC: 31 MMOL/L — HIGH (ref 22–29)
HCT VFR BLD CALC: 32.6 % — LOW (ref 34.5–45)
HCT VFR BLDA CALC: 29 % — LOW (ref 34.5–46.5)
HGB BLD CALC-MCNC: 9.8 G/DL — LOW (ref 11.7–16.1)
HGB BLD-MCNC: 9.7 G/DL — LOW (ref 11.5–15.5)
LACTATE BLDV-MCNC: 1.6 MMOL/L — SIGNIFICANT CHANGE UP (ref 0.5–2)
LYMPHOCYTES # BLD AUTO: 1.1 K/UL — SIGNIFICANT CHANGE UP (ref 1–3.3)
LYMPHOCYTES # BLD AUTO: 13.1 % — SIGNIFICANT CHANGE UP (ref 13–44)
MANUAL SMEAR VERIFICATION: SIGNIFICANT CHANGE UP
MCHC RBC-ENTMCNC: 25.4 PG — LOW (ref 27–34)
MCHC RBC-ENTMCNC: 29.8 GM/DL — LOW (ref 32–36)
MCV RBC AUTO: 85.3 FL — SIGNIFICANT CHANGE UP (ref 80–100)
METAMYELOCYTES # FLD: 0.9 % — HIGH (ref 0–0)
MONOCYTES # BLD AUTO: 0.08 K/UL — SIGNIFICANT CHANGE UP (ref 0–0.9)
MONOCYTES NFR BLD AUTO: 0.9 % — LOW (ref 2–14)
MYELOCYTES NFR BLD: 3.5 % — HIGH (ref 0–0)
NEUTROPHILS # BLD AUTO: 6.75 K/UL — SIGNIFICANT CHANGE UP (ref 1.8–7.4)
NEUTROPHILS NFR BLD AUTO: 79.8 % — HIGH (ref 43–77)
NEUTS BAND # BLD: 0.9 % — SIGNIFICANT CHANGE UP (ref 0–8)
PCO2 BLDV: 53 MMHG — HIGH (ref 39–42)
PH BLDV: 7.38 — SIGNIFICANT CHANGE UP (ref 7.32–7.43)
PLAT MORPH BLD: NORMAL — SIGNIFICANT CHANGE UP
PLATELET # BLD AUTO: 178 K/UL — SIGNIFICANT CHANGE UP (ref 150–400)
PO2 BLDV: 56 MMHG — HIGH (ref 25–45)
POLYCHROMASIA BLD QL SMEAR: SLIGHT — SIGNIFICANT CHANGE UP
POTASSIUM BLDV-SCNC: 4.5 MMOL/L — SIGNIFICANT CHANGE UP (ref 3.5–5.1)
POTASSIUM SERPL-MCNC: 4.6 MMOL/L — SIGNIFICANT CHANGE UP (ref 3.5–5.3)
POTASSIUM SERPL-SCNC: 4.6 MMOL/L — SIGNIFICANT CHANGE UP (ref 3.5–5.3)
PROT SERPL-MCNC: 7.6 G/DL — SIGNIFICANT CHANGE UP (ref 6–8.3)
RBC # BLD: 3.82 M/UL — SIGNIFICANT CHANGE UP (ref 3.8–5.2)
RBC # FLD: 18 % — HIGH (ref 10.3–14.5)
RBC BLD AUTO: SIGNIFICANT CHANGE UP
RSV RNA NPH QL NAA+NON-PROBE: SIGNIFICANT CHANGE UP
SAO2 % BLDV: 90 % — HIGH (ref 67–88)
SARS-COV-2 RNA SPEC QL NAA+PROBE: SIGNIFICANT CHANGE UP
SODIUM SERPL-SCNC: 132 MMOL/L — LOW (ref 135–145)
WBC # BLD: 8.37 K/UL — SIGNIFICANT CHANGE UP (ref 3.8–10.5)
WBC # FLD AUTO: 8.37 K/UL — SIGNIFICANT CHANGE UP (ref 3.8–10.5)

## 2024-02-19 PROCEDURE — 71250 CT THORAX DX C-: CPT | Mod: 26,MA

## 2024-02-19 PROCEDURE — 74176 CT ABD & PELVIS W/O CONTRAST: CPT | Mod: 26,MA

## 2024-02-19 PROCEDURE — 99285 EMERGENCY DEPT VISIT HI MDM: CPT | Mod: GC

## 2024-02-19 PROCEDURE — 71045 X-RAY EXAM CHEST 1 VIEW: CPT | Mod: 26

## 2024-02-19 RX ORDER — CHLORHEXIDINE GLUCONATE 213 G/1000ML
15 SOLUTION TOPICAL EVERY 12 HOURS
Refills: 0 | Status: DISCONTINUED | OUTPATIENT
Start: 2024-02-19 | End: 2024-02-23

## 2024-02-19 RX ORDER — ACETAMINOPHEN 500 MG
975 TABLET ORAL ONCE
Refills: 0 | Status: COMPLETED | OUTPATIENT
Start: 2024-02-19 | End: 2024-02-19

## 2024-02-19 RX ADMIN — Medication 975 MILLIGRAM(S): at 17:40

## 2024-02-19 NOTE — ED PROVIDER NOTE - NSFOLLOWUPINSTRUCTIONS_ED_ALL_ED_FT
Today in the emergency department you were evaluated for increasing respiratory secretions and concerns about changing your tracheostomy.  You were evaluated by ENT who changed her tracheostomy.  Please follow-up with your ENT doctor as well as your respiratory/pulmonary doctor.    Please follow up with your primary care physician within 1-2 weeks of discharge from the emergency department.  Please bring a copy of your results with you.  Please return to the emergency department for worsening of your symptoms.    You may take Acetaminophen over the counter as needed for pain and/or fever. Use as directed and see medication warnings.  You may take Ibuprofen over the counter as needed for pain and/or fever. Use as directed and see medication warnings.

## 2024-02-19 NOTE — ED ADULT NURSE NOTE - NSFALLRISKINTERV_ED_ALL_ED
Assistance with ambulation/Communicate fall risk and risk factors to all staff, patient, and family/Monitor gait and stability/Provide visual cue: yellow wristband, yellow gown, etc/Reinforce activity limits and safety measures with patient and family/Call bell, personal items and telephone in reach/Instruct patient to call for assistance before getting out of bed/chair/stretcher/Non-slip footwear applied when patient is off stretcher/[place] to call system/Physically safe environment - no spills, clutter or unnecessary equipment/Purposeful Proactive Rounding/Room/bathroom lighting operational, light cord in reach

## 2024-02-19 NOTE — ED PROVIDER NOTE - CLINICAL SUMMARY MEDICAL DECISION MAKING FREE TEXT BOX
Marivel Collier MD - Attending Physician: Pt here with concern for malfunctioning vent. Patient arrives well appearing, no tachypnea, no retractions, 100% on home vent settings. Lungs coarse but nonfocal. Comfortable in bed and verbalizing at baseline. Does have increased secretions per daughter. Here vent not alerting. Will get labs, XR, Flu/Covid and monitor vent for any alerts

## 2024-02-19 NOTE — CONSULT NOTE ADULT - SUBJECTIVE AND OBJECTIVE BOX
CC: trach eval     HPI:  T2DM, HTN, HLD, anemia, hypothyroidism, RA, fibromyalgia, remote cerebral aneurysm repair, acute hypercapnic respiratory failure now with tracheostomy, PEG tube (initially placed 2022), and bedbound (trach change 8/18, PEG change 8/14/2023), recent diagnosis and continued treatment of c. diff presents with daughter for evaluation of respiratory status. Daughter states that patient has had increased secretions recently. Daughter also states that vent settings have been continuously demonstrating "high minute volume" prompts on ventilator. Pt is able to communicate intermittently, AOX1-2 at baseline. Pt denying complaints, appears comfortable.    PAST MEDICAL & SURGICAL HISTORY:  Diabetes      Rheumatoid arthritis      Fibromyalgia      Hypothyroid      Hypertension      H/O tracheostomy      PEG (percutaneous endoscopic gastrostomy) status        Allergies    heparin (Unknown)  penicillin (Unknown)  Tagamet (Unknown)  Lyrica (Unknown)  walnut (Unknown)  Pecan, Filbert, Hazelnut (Unknown)  pineapple (Unknown)    Intolerances      MEDICATIONS  (STANDING):  chlorhexidine 0.12% Liquid 15 milliLiter(s) Oral Mucosa every 12 hours    MEDICATIONS  (PRN):      Social History: **??**    Family history: Pt denies any sign FHx    ROS:   ENT: all negative except as noted in HPI   CV: denies palpitations  Pulm: denies SOB, cough, hemoptysis  GI: denies change in apetite, indigestion, n/v  : denies pertinent urinary symptoms, urgency  Neuro: denies numbness/tingling, loss of sensation  Psych: denies anxiety  MS: denies muscle weakness, instability  Heme: denies easy bruising or bleeding  Endo: denies heat/cold intolerance, excessive sweating  Vascular: denies LE edema    Vital Signs Last 24 Hrs  T(C): 36.6 (19 Feb 2024 19:40), Max: 36.9 (19 Feb 2024 14:00)  T(F): 97.8 (19 Feb 2024 19:40), Max: 98.5 (19 Feb 2024 14:00)  HR: 74 (19 Feb 2024 19:40) (74 - 81)  BP: 142/61 (19 Feb 2024 19:40) (126/61 - 145/70)  BP(mean): --  RR: 20 (19 Feb 2024 19:40) (20 - 20)  SpO2: 100% (19 Feb 2024 19:40) (100% - 100%)    Parameters below as of 19 Feb 2024 19:40  Patient On (Oxygen Delivery Method): ventilator                              9.7    8.37  )-----------( 178      ( 19 Feb 2024 15:40 )             32.6    02-19    132<L>  |  96  |  29<H>  ----------------------------<  199<H>  4.6   |  27  |  <0.30<L>    Ca    9.7      19 Feb 2024 15:39    TPro  7.6  /  Alb  3.6  /  TBili  0.3  /  DBili  x   /  AST  28  /  ALT  24  /  AlkPhos  73  02-19       PHYSICAL EXAM:  Gen: NAD  Skin: No rashes, bruises, or lesions  Head: Normocephalic, Atraumatic  Face: no edema, erythema, or fluctuance. Parotid glands soft without mass  Eyes: no scleral injection  Nose: Nares bilaterally patent, no discharge  Mouth: No Stridor / Drooling / Trismus.  Mucosa moist, tongue/uvula midline, oropharynx clear  Neck: #8 distal cuffed XLT, no secretion or drainage noted around stoma.  Flat, supple, no lymphadenopathy, trachea midline, no masses  Lymphatic: No lymphadenopathy  Resp: breathing easily, no stridor  CV: no peripheral edema/cyanosis  GI: nondistended   Peripheral vascular: no JVD or edema  Neuro: facial nerve intact, no facial droop          Fiberoptic Indirect laryngoscopy:  (Scope #2 used)       CC: trach eval     HPI:  T2DM, HTN, HLD, anemia, hypothyroidism, RA, fibromyalgia, remote cerebral aneurysm repair, acute hypercapnic respiratory failure now with tracheostomy, PEG tube (initially placed 2022), and bedbound (trach change 8/18, PEG change 8/14/2023), recent diagnosis and continued treatment of c. diff presents with daughter for evaluation of respiratory status. Daughter states that patient has had increased secretions recently. Daughter also states that vent settings have been continuously demonstrating "high minute volume" prompts on ventilator. Pt is able to communicate intermittently, AOX1-2 at baseline. Pt denying complaints, appears comfortable. ET saw pt and noted everything with trach and vent seemed to be running smoothly. ENT called for trach eval. PT daughter states she is also due for trach change. 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     PAST MEDICAL & SURGICAL HISTORY:  Diabetes      Rheumatoid arthritis      Fibromyalgia      Hypothyroid      Hypertension      H/O tracheostomy      PEG (percutaneous endoscopic gastrostomy) status        Allergies    heparin (Unknown)  penicillin (Unknown)  Tagamet (Unknown)  Lyrica (Unknown)  walnut (Unknown)  Pecan, Filbert, Hazelnut (Unknown)  pineapple (Unknown)    Intolerances      MEDICATIONS  (STANDING):  chlorhexidine 0.12% Liquid 15 milliLiter(s) Oral Mucosa every 12 hours    MEDICATIONS  (PRN):      Social History: **??**    Family history: Pt denies any sign FHx    ROS:   ENT: all negative except as noted in HPI   CV: denies palpitations  Pulm: denies SOB, cough, hemoptysis  GI: denies change in apetite, indigestion, n/v  : denies pertinent urinary symptoms, urgency  Neuro: denies numbness/tingling, loss of sensation  Psych: denies anxiety  MS: denies muscle weakness, instability  Heme: denies easy bruising or bleeding  Endo: denies heat/cold intolerance, excessive sweating  Vascular: denies LE edema    Vital Signs Last 24 Hrs  T(C): 36.6 (19 Feb 2024 19:40), Max: 36.9 (19 Feb 2024 14:00)  T(F): 97.8 (19 Feb 2024 19:40), Max: 98.5 (19 Feb 2024 14:00)  HR: 74 (19 Feb 2024 19:40) (74 - 81)  BP: 142/61 (19 Feb 2024 19:40) (126/61 - 145/70)  BP(mean): --  RR: 20 (19 Feb 2024 19:40) (20 - 20)  SpO2: 100% (19 Feb 2024 19:40) (100% - 100%)    Parameters below as of 19 Feb 2024 19:40  Patient On (Oxygen Delivery Method): ventilator                              9.7    8.37  )-----------( 178      ( 19 Feb 2024 15:40 )             32.6    02-19    132<L>  |  96  |  29<H>  ----------------------------<  199<H>  4.6   |  27  |  <0.30<L>    Ca    9.7      19 Feb 2024 15:39    TPro  7.6  /  Alb  3.6  /  TBili  0.3  /  DBili  x   /  AST  28  /  ALT  24  /  AlkPhos  73  02-19       PHYSICAL EXAM:  Gen: NAD  Skin: No rashes, bruises, or lesions  Head: Normocephalic, Atraumatic  Face: no edema, erythema, or fluctuance. Parotid glands soft without mass  Eyes: no scleral injection  Nose: Nares bilaterally patent, no discharge  Mouth: No Stridor / Drooling / Trismus.  Mucosa moist, tongue/uvula midline, oropharynx clear  Neck: #8 distal cuffed XLT, no secretion or drainage noted around stoma.  Flat, supple, no lymphadenopathy, trachea midline, no masses  Lymphatic: No lymphadenopathy  Resp: breathing easily, no stridor  CV: no peripheral edema/cyanosis  GI: nondistended   Peripheral vascular: no JVD or edema  Neuro: facial nerve intact, no facial droop        Procedure Note:  #8 distal XLT cuff deflated, velcro straps removed, tracheostomy tube removed. Stoma suctioned and without bleeding, erythema, or edema. #8 distal XLT placed into stoma without resistance. Obturator replaced with inner cannula and locked in place. Cuff inflated and reconnected to vent, ventilating well. Velcro straps secured. Trach placement confirmed via scope with visualization of the nick. No active bleeding noted.

## 2024-02-19 NOTE — CONSULT NOTE ADULT - ASSESSMENT
T2DM, HTN, HLD, anemia, hypothyroidism, RA, fibromyalgia, remote cerebral aneurysm repair, acute hypercapnic respiratory failure now with tracheostomy, PEG tube (initially placed 2022), and bedbound (trach change 8/18, PEG change 8/14/2023), recent diagnosis and continued treatment of c. diff presents with daughter for evaluation of respiratory status. Daughter states that patient has had increased secretions recently. Daughter also states that vent settings have been continuously demonstrating "high minute volume" prompts on ventilator. Pt is able to communicate intermittently, AOX1-2 at baseline. Pt denying complaints, appears comfortable. ET saw pt and noted everything with trach and vent seemed to be running smoothly. ENT called for trach eval. PT daughter states she is also due for trach change. 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 trach changed without issue. no obstructions or increased secretions noted.

## 2024-02-19 NOTE — ED PROVIDER NOTE - OBJECTIVE STATEMENT
T2DM, HTN, HLD, anemia, hypothyroidism, RA, fibromyalgia, remote cerebral aneurysm repair, acute hypercapnic respiratory failure now with tracheostomy, PEG tube (initally placed 2022), and bedbound (trach change 8/18, PEG change 8/14/2023), presents with daughter for evaluation of nausea, vomiting episode today. Daughter states that patient has been lethargic for the last several days and then today as daughter was leaving for work, patient yelled out for help and had an episode of nausea and vomiting, and extreme dizziness. Patient had similar symptoms during previous cardiac episodes. Pt e No difficulties T2DM, HTN, HLD, anemia, hypothyroidism, RA, fibromyalgia, remote cerebral aneurysm repair, acute hypercapnic respiratory failure now with tracheostomy, PEG tube (initially placed 2022), and bedbound (trach change 8/18, PEG change 8/14/2023), recent diagnosis and continued treatment of c. diff presents with daughter for evaluation of respiratory statys. Daughter states that patient has had increased secretions recently. Daughter also states that vent settings have been continuously demonstrating "high minute volume" prompts on ventilator. Pt is able to communicate intermittently, AOX1-2 at baseline. When asked if T2DM, HTN, HLD, anemia, hypothyroidism, RA, fibromyalgia, remote cerebral aneurysm repair, acute hypercapnic respiratory failure now with tracheostomy, PEG tube (initially placed 2022), and bedbound (trach change 8/18, PEG change 8/14/2023), recent diagnosis and continued treatment of c. diff presents with daughter for evaluation of respiratory status. Daughter states that patient has had increased secretions recently. Daughter also states that vent settings have been continuously demonstrating "high minute volume" prompts on ventilator. Pt is able to communicate intermittently, AOX1-2 at baseline. Pt denying complaints, appears comfortable. 74yoF h/o T2DM, HTN, HLD, anemia, hypothyroidism, RA, fibromyalgia, remote cerebral aneurysm repair, acute hypercapnic respiratory failure now with tracheostomy, PEG tube (initially placed 2022), and bedbound, recent diagnosis and continued treatment of c. diff presents with daughter for evaluation of respiratory status. Daughter states that patient has had increased secretions recently. Daughter also states that vent settings have been continuously demonstrating "high pressure and low minute volume" prompts on ventilator. Pt is able to communicate intermittently, AOX1-2 at baseline. Pt denying complaints, appears comfortable. No fevers. No significant cough. Some leak around Gtube site, but is tolerating feeds

## 2024-02-19 NOTE — CONSULT NOTE ADULT - PROBLEM SELECTOR RECOMMENDATION 9
- Pt discussed in detail with Dr. Nuno, plan to   - vent settings per RT   - trach care per daughter   - follow up with outpt f/u as scheduled.

## 2024-02-19 NOTE — ED ADULT NURSE REASSESSMENT NOTE - NS ED NURSE REASSESS COMMENT FT1
RN at bedside, cleaning and changing patient, pt's allevyn changed. Rn setting up to placed rectal tube, Pt's daughter requests a different RN to placed the rectal tube. Daughter states, "is there anyone that has done this before that can do it". Rn tells pt that rectal tube is not commonly done in the ED, and RN has done emerson placements before. RN offers a different RN to place rectal tube. Daughter states, "has she done it recently?" to which rn replies, not recently but had experience. daughter requests a different RN, stating "can someone come down to the ED that does it regularly" RN notifies daughter that will delay care. Daughter verbalized understanding. MD notified. GERMANIA huntley notified. GERMANIA states that she will come and do it once she is available RN at bedside, cleaning and changing patient, pt's allevyn changed. Rn setting up to placed rectal tube, Pt's daughter requests a different RN to placed the rectal tube. Daughter states, "is there anyone that has done this before that can do it". Rn tells pt that rectal tube is not commonly done in the ED, and RN has done emerson placements before. RN offers a different RN to place rectal tube. Daughter states, "has she done it recently?" to which rn replies, not recently but had experience. daughter requests a different RN, stating "can someone come down to the ED that does it regularly" RN notifies daughter that will delay care. Daughter verbalized understanding. MD notified of situation regarding rectal tube and of daughter's request for ENT. MD states that lc that's that pt will be d/c. GERMANIA huntley notified. GERMANIA states that she will come and do it once she is available.

## 2024-02-19 NOTE — ED ADULT NURSE NOTE - OBJECTIVE STATEMENT
73 y/o F presents to the ED with complaints of problems with ventilator. Per EMS, pt's ventiliator kept going off last night and pt's respiratory therapist and MD could not figure out why and suggested that pt come into the ED. Per daughter, pt has c. diff, and has noticed an increase in secretions from the pt. Pt A&Ox3 gross neuro intact, lungs cta bilaterally, no difficulty speaking in complete sentences, s1s2 heart sounds heard, pulses x 4,  abdomen soft nontender nondistended, PEG tube noted on RUQ, iv 20g placed in L hand. Pt cleaned, new bedding changed, and pt changed into hospital gown, bed in lowest position, appropriate side rails in place. Pt given call bell, oriented to call bell system, purewick placed on pt, new suction canister prepared and at bedside for suctioning of pt. stage 4 sacral wound noted, dressing changed.

## 2024-02-19 NOTE — ED ADULT NURSE REASSESSMENT NOTE - NS ED NURSE REASSESS COMMENT FT1
Report taken from DAVIDA YOUNG. Pt and family introduced to oncoming RN and updated on plan of care. pt is A&OX0, VSS. Call bell in reach, family educated on use. Bed locked and in lowest position. Denies any needs or complaints at this time. pending CT

## 2024-02-19 NOTE — ED ADULT NURSE REASSESSMENT NOTE - NS ED NURSE REASSESS COMMENT FT1
RN made MD Bonner and Nany aware of pt requesting MD for further explanation of plan of care and pending ventilator settings. pt resting comfortably and no acute distress noted. VSS. pending CT.

## 2024-02-19 NOTE — ED PROVIDER NOTE - PROGRESS NOTE DETAILS
Patient family member agitated and ongoing concern about vent and trach sizing.  ENT paged for trach evaluation.  ENT will be down to evaluate the patient. ENT evaluated patient.  Trach changed.  Dispo to home with outpatient ENT and respiratory/pulm follow-up. CT chest abdomen pelvis resulted without any acute findings.  Dispo to home with outpatient follow-up.

## 2024-02-19 NOTE — ED PROVIDER NOTE - PATIENT PORTAL LINK FT
You can access the FollowMyHealth Patient Portal offered by [facility] by registering at the following website: [email or web address]. By joining [facility]’s FollowMyHealth portal, you will also be able to view your health information using other applications (apps) compatible with our system. You can access the FollowMyHealth Patient Portal offered by [facility] by registering at the following website: [email or web address]. By joining [facility]’s FollowMyHealth portal, you will also be able to view your health information using other applications (apps) compatible with our system.

## 2024-02-19 NOTE — ED PROVIDER NOTE - PHYSICAL EXAMINATION
· CONSTITUTIONAL: In no apparent distress.  · HEENMT: Airway patent with trach in place without surrounding erythema, moist oral mucosa  · EYES: Pupils equal, round and reactive to light, Extra-ocular movement intact, eyes are clear b/l  · CARDIAC: Regular rate and rhythm, Heart sounds S1 S2 present, no murmurs, rubs or gallops  · RESPIRATORY: No respiratory distress or increased WOB. Coarse BS bilaterally with good air movement. Sat 100% on home vent settings  · GASTROINTESTINAL: Abdomen soft, non-tender and non-distended, Gtube in place without surrounding erythema/edema  · NEUROLOGICAL: Alert and interactive, intermittently verbalizes, normal tone  · SKIN: No cyanosis, no pallor, no jaundice, no rash

## 2024-02-20 VITALS
HEART RATE: 78 BPM | TEMPERATURE: 98 F | OXYGEN SATURATION: 100 % | DIASTOLIC BLOOD PRESSURE: 64 MMHG | SYSTOLIC BLOOD PRESSURE: 154 MMHG | RESPIRATION RATE: 25 BRPM

## 2024-02-20 PROCEDURE — 82962 GLUCOSE BLOOD TEST: CPT

## 2024-02-20 PROCEDURE — 82330 ASSAY OF CALCIUM: CPT

## 2024-02-20 PROCEDURE — 82435 ASSAY OF BLOOD CHLORIDE: CPT

## 2024-02-20 PROCEDURE — 82947 ASSAY GLUCOSE BLOOD QUANT: CPT

## 2024-02-20 PROCEDURE — 96374 THER/PROPH/DIAG INJ IV PUSH: CPT

## 2024-02-20 PROCEDURE — 71045 X-RAY EXAM CHEST 1 VIEW: CPT

## 2024-02-20 PROCEDURE — 85018 HEMOGLOBIN: CPT

## 2024-02-20 PROCEDURE — 84132 ASSAY OF SERUM POTASSIUM: CPT

## 2024-02-20 PROCEDURE — 84295 ASSAY OF SERUM SODIUM: CPT

## 2024-02-20 PROCEDURE — 85025 COMPLETE CBC W/AUTO DIFF WBC: CPT

## 2024-02-20 PROCEDURE — 83605 ASSAY OF LACTIC ACID: CPT

## 2024-02-20 PROCEDURE — 36415 COLL VENOUS BLD VENIPUNCTURE: CPT

## 2024-02-20 PROCEDURE — 85014 HEMATOCRIT: CPT

## 2024-02-20 PROCEDURE — 82803 BLOOD GASES ANY COMBINATION: CPT

## 2024-02-20 PROCEDURE — 74176 CT ABD & PELVIS W/O CONTRAST: CPT | Mod: MA

## 2024-02-20 PROCEDURE — 87637 SARSCOV2&INF A&B&RSV AMP PRB: CPT

## 2024-02-20 PROCEDURE — 94002 VENT MGMT INPAT INIT DAY: CPT

## 2024-02-20 PROCEDURE — 99284 EMERGENCY DEPT VISIT MOD MDM: CPT | Mod: 25

## 2024-02-20 PROCEDURE — 80053 COMPREHEN METABOLIC PANEL: CPT

## 2024-02-20 PROCEDURE — 71250 CT THORAX DX C-: CPT | Mod: MA

## 2024-02-20 RX ORDER — ACETAMINOPHEN 500 MG
1000 TABLET ORAL ONCE
Refills: 0 | Status: COMPLETED | OUTPATIENT
Start: 2024-02-20 | End: 2024-02-20

## 2024-02-20 RX ADMIN — Medication 400 MILLIGRAM(S): at 02:26

## 2024-02-20 NOTE — ED ADULT NURSE REASSESSMENT NOTE - NS ED NURSE REASSESS COMMENT FT1
port has been accessed with sterile technique with two RNs at bedside. port flushed without difficulty but no blood return x 4. MD [place] made aware. pending MD orders. Pt awake, alert, with appropriate behavior noted. Family member at bedside denies additional needs at this time.

## 2024-03-11 ENCOUNTER — APPOINTMENT (OUTPATIENT)
Dept: ENDOCRINOLOGY | Facility: CLINIC | Age: 74
End: 2024-03-11
Payer: MEDICARE

## 2024-03-11 PROCEDURE — G0108 DIAB MANAGE TRN  PER INDIV: CPT | Mod: 2W

## 2024-03-18 ENCOUNTER — APPOINTMENT (OUTPATIENT)
Dept: ENDOCRINOLOGY | Facility: CLINIC | Age: 74
End: 2024-03-18
Payer: MEDICARE

## 2024-03-18 PROCEDURE — 99215 OFFICE O/P EST HI 40 MIN: CPT

## 2024-03-18 NOTE — HISTORY OF PRESENT ILLNESS
[FreeTextEntry1] :  This visit was provided via telehealth using real-time 2-way audio visual technology. The patient, MAXX WOODS was located at home, [address] [place] [address]. [place] [address] at the time of the visit.  The provider, NANCY ALONSO DO, was located at the medical office located in [address] [place] [address] [address] [facility], [place] [address] at the time of the visit. The patient, MAXX WOODS and Provider participated in the telehealth encounter along with daughter, Marysol Woods.  Verbal consent given on MARCH 18, 2024 by Patient's daughter, Marysol.   73 y/o F with complex medical history with a PMHx of T2DM, HTN, HLD, anemia, hypothyroidism, RA, fibromyalgia, remote cerebral aneurysm repair, acute hypercapnic respiratory failure now with tracheostomy, PEG tube, and bedbound (trach change 8/18, PEG change 8/14), sacral pressure ulcer. Patient was recently in the hospital for 6 day hx of bleeding from the tracheostomy and PEG tube with associated abdominal pain. Endocrinology was consulted for hyperglycemia   Prior history: Spoke with patient's daughter Marysol who reports DM2 for 20 years, thought to be due to longterm high doses of prednisone for treatment of rheumatoid arthritis Patient's tube feeds are now at 80ml/hr - running for about 12-13 hours   Daughter reports no known microvascular complications Patients home insulin regimen prior to coming to this hospital was levemir 14 units daily and metformin 500mg BID PAtient has been mostly hospitalized for the last 1 year and daughter reports varying insulin regimens and glucose control.  fasting- 120s midday- 100s evening- mid-high 200s   lows occasionally if feeds are held    Of note, patient is on Lt4 125mcg daily

## 2024-03-18 NOTE — ASSESSMENT
[FreeTextEntry1] :  #Type 2 DM - patient is currently on tube feeds from 6a-6pm -Patient is currently on Lantus 15 units every morning along with 10/10/6 of regular insulin - given 12 hour feeds, ideally would be better on NPH however patient states that the NPH doesn't sit well with patient r Plan: -For now, continue with Lantus 15 units- they would like to switch to nighttime which is reasonable  -Given increase in blood sugars in the evening, recommend to increase regular insulin to 10/12/6 -We spoke about that if tube feeds are held for any reason, hold regular insulin.  If blood glucose is under 100, hold regular insulin.  If blood glucose is under 70 persistently, let us know to further adjust regimen -Patient needs to establish care with GI to help manage tube feeds - start using Dexcom G7   # Secondary adrenal insufficiency. - Due to chronic steroid use  - c/w prednisone 5mg daily   # Hypothyroidism. - continue levothyroxine 125mcg daily

## 2024-03-21 ENCOUNTER — TRANSCRIPTION ENCOUNTER (OUTPATIENT)
Age: 74
End: 2024-03-21

## 2024-03-21 ENCOUNTER — NON-APPOINTMENT (OUTPATIENT)
Age: 74
End: 2024-03-21

## 2024-03-23 ENCOUNTER — NON-APPOINTMENT (OUTPATIENT)
Age: 74
End: 2024-03-23

## 2024-03-29 ENCOUNTER — APPOINTMENT (OUTPATIENT)
Dept: INFECTIOUS DISEASE | Facility: CLINIC | Age: 74
End: 2024-03-29
Payer: MEDICARE

## 2024-03-29 ENCOUNTER — APPOINTMENT (OUTPATIENT)
Dept: INFECTIOUS DISEASE | Facility: CLINIC | Age: 74
End: 2024-03-29

## 2024-03-29 ENCOUNTER — NON-APPOINTMENT (OUTPATIENT)
Age: 74
End: 2024-03-29

## 2024-03-29 PROCEDURE — 99443: CPT | Mod: 93

## 2024-03-30 RX ORDER — FIDAXOMICIN 200 MG/5ML
1 GRANULE, FOR SUSPENSION ORAL
Qty: 0 | Refills: 0 | DISCHARGE
Start: 2024-03-30

## 2024-04-02 ENCOUNTER — INPATIENT (INPATIENT)
Facility: HOSPITAL | Age: 74
LOS: 28 days | Discharge: HOME HEALTH | DRG: 907 | End: 2024-05-01
Attending: STUDENT IN AN ORGANIZED HEALTH CARE EDUCATION/TRAINING PROGRAM | Admitting: INTERNAL MEDICINE
Payer: MEDICARE

## 2024-04-02 VITALS
DIASTOLIC BLOOD PRESSURE: 69 MMHG | TEMPERATURE: 99 F | RESPIRATION RATE: 25 BRPM | SYSTOLIC BLOOD PRESSURE: 142 MMHG | HEART RATE: 95 BPM | OXYGEN SATURATION: 98 % | WEIGHT: 125 LBS | HEIGHT: 59 IN

## 2024-04-02 DIAGNOSIS — L03.311 CELLULITIS OF ABDOMINAL WALL: ICD-10-CM

## 2024-04-02 DIAGNOSIS — Z98.890 OTHER SPECIFIED POSTPROCEDURAL STATES: Chronic | ICD-10-CM

## 2024-04-02 DIAGNOSIS — Z93.1 GASTROSTOMY STATUS: Chronic | ICD-10-CM

## 2024-04-02 LAB
ALBUMIN SERPL ELPH-MCNC: 3.3 G/DL — SIGNIFICANT CHANGE UP (ref 3.3–5)
ALP SERPL-CCNC: 49 U/L — SIGNIFICANT CHANGE UP (ref 40–120)
ALT FLD-CCNC: 20 U/L — SIGNIFICANT CHANGE UP (ref 10–45)
ANION GAP SERPL CALC-SCNC: 11 MMOL/L — SIGNIFICANT CHANGE UP (ref 5–17)
ANISOCYTOSIS BLD QL: SLIGHT — SIGNIFICANT CHANGE UP
AST SERPL-CCNC: 19 U/L — SIGNIFICANT CHANGE UP (ref 10–40)
BASE EXCESS BLDV CALC-SCNC: 3.6 MMOL/L — HIGH (ref -2–3)
BASOPHILS # BLD AUTO: 0 K/UL — SIGNIFICANT CHANGE UP (ref 0–0.2)
BASOPHILS # BLD AUTO: 0.02 K/UL — SIGNIFICANT CHANGE UP (ref 0–0.2)
BASOPHILS NFR BLD AUTO: 0 % — SIGNIFICANT CHANGE UP (ref 0–2)
BASOPHILS NFR BLD AUTO: 0.2 % — SIGNIFICANT CHANGE UP (ref 0–2)
BILIRUB SERPL-MCNC: 0.4 MG/DL — SIGNIFICANT CHANGE UP (ref 0.2–1.2)
BUN SERPL-MCNC: 22 MG/DL — SIGNIFICANT CHANGE UP (ref 7–23)
CA-I SERPL-SCNC: 1.26 MMOL/L — SIGNIFICANT CHANGE UP (ref 1.15–1.33)
CALCIUM SERPL-MCNC: 9.3 MG/DL — SIGNIFICANT CHANGE UP (ref 8.4–10.5)
CHLORIDE BLDV-SCNC: 100 MMOL/L — SIGNIFICANT CHANGE UP (ref 96–108)
CHLORIDE SERPL-SCNC: 99 MMOL/L — SIGNIFICANT CHANGE UP (ref 96–108)
CO2 BLDV-SCNC: 30 MMOL/L — HIGH (ref 22–26)
CO2 SERPL-SCNC: 26 MMOL/L — SIGNIFICANT CHANGE UP (ref 22–31)
CREAT SERPL-MCNC: <0.3 MG/DL — LOW (ref 0.5–1.3)
DACRYOCYTES BLD QL SMEAR: SLIGHT — SIGNIFICANT CHANGE UP
EGFR: 113 ML/MIN/1.73M2 — SIGNIFICANT CHANGE UP
EOSINOPHIL # BLD AUTO: 0 K/UL — SIGNIFICANT CHANGE UP (ref 0–0.5)
EOSINOPHIL # BLD AUTO: 0.01 K/UL — SIGNIFICANT CHANGE UP (ref 0–0.5)
EOSINOPHIL NFR BLD AUTO: 0 % — SIGNIFICANT CHANGE UP (ref 0–6)
EOSINOPHIL NFR BLD AUTO: 0.1 % — SIGNIFICANT CHANGE UP (ref 0–6)
GAS PNL BLDV: 136 MMOL/L — SIGNIFICANT CHANGE UP (ref 136–145)
GAS PNL BLDV: SIGNIFICANT CHANGE UP
GLUCOSE BLDV-MCNC: 147 MG/DL — HIGH (ref 70–99)
GLUCOSE SERPL-MCNC: 155 MG/DL — HIGH (ref 70–99)
HCO3 BLDV-SCNC: 28 MMOL/L — SIGNIFICANT CHANGE UP (ref 22–29)
HCT VFR BLD CALC: 31.7 % — LOW (ref 34.5–45)
HCT VFR BLD CALC: 33.4 % — LOW (ref 34.5–45)
HCT VFR BLDA CALC: 29 % — LOW (ref 34.5–46.5)
HGB BLD CALC-MCNC: 9.8 G/DL — LOW (ref 11.7–16.1)
HGB BLD-MCNC: 9.5 G/DL — LOW (ref 11.5–15.5)
HGB BLD-MCNC: 9.7 G/DL — LOW (ref 11.5–15.5)
HOROWITZ INDEX BLDV+IHG-RTO: 30 — SIGNIFICANT CHANGE UP
IMM GRANULOCYTES NFR BLD AUTO: 4.2 % — HIGH (ref 0–0.9)
LACTATE BLDV-MCNC: 3.1 MMOL/L — HIGH (ref 0.5–2)
LYMPHOCYTES # BLD AUTO: 0.86 K/UL — LOW (ref 1–3.3)
LYMPHOCYTES # BLD AUTO: 1.96 K/UL — SIGNIFICANT CHANGE UP (ref 1–3.3)
LYMPHOCYTES # BLD AUTO: 22.6 % — SIGNIFICANT CHANGE UP (ref 13–44)
LYMPHOCYTES # BLD AUTO: 8.7 % — LOW (ref 13–44)
MANUAL SMEAR VERIFICATION: SIGNIFICANT CHANGE UP
MCHC RBC-ENTMCNC: 24.8 PG — LOW (ref 27–34)
MCHC RBC-ENTMCNC: 25 PG — LOW (ref 27–34)
MCHC RBC-ENTMCNC: 29 GM/DL — LOW (ref 32–36)
MCHC RBC-ENTMCNC: 30 GM/DL — LOW (ref 32–36)
MCV RBC AUTO: 83.4 FL — SIGNIFICANT CHANGE UP (ref 80–100)
MCV RBC AUTO: 85.4 FL — SIGNIFICANT CHANGE UP (ref 80–100)
MONOCYTES # BLD AUTO: 0.35 K/UL — SIGNIFICANT CHANGE UP (ref 0–0.9)
MONOCYTES # BLD AUTO: 0.52 K/UL — SIGNIFICANT CHANGE UP (ref 0–0.9)
MONOCYTES NFR BLD AUTO: 3.5 % — SIGNIFICANT CHANGE UP (ref 2–14)
MONOCYTES NFR BLD AUTO: 6 % — SIGNIFICANT CHANGE UP (ref 2–14)
NEUTROPHILS # BLD AUTO: 5.8 K/UL — SIGNIFICANT CHANGE UP (ref 1.8–7.4)
NEUTROPHILS # BLD AUTO: 8.67 K/UL — HIGH (ref 1.8–7.4)
NEUTROPHILS NFR BLD AUTO: 66.9 % — SIGNIFICANT CHANGE UP (ref 43–77)
NEUTROPHILS NFR BLD AUTO: 83.5 % — HIGH (ref 43–77)
NEUTS BAND # BLD: 4.3 % — SIGNIFICANT CHANGE UP (ref 0–8)
NRBC # BLD: 0 /100 WBCS — SIGNIFICANT CHANGE UP (ref 0–0)
OVALOCYTES BLD QL SMEAR: SLIGHT — SIGNIFICANT CHANGE UP
PCO2 BLDV: 44 MMHG — HIGH (ref 39–42)
PH BLDV: 7.42 — SIGNIFICANT CHANGE UP (ref 7.32–7.43)
PLAT MORPH BLD: NORMAL — SIGNIFICANT CHANGE UP
PLATELET # BLD AUTO: 125 K/UL — LOW (ref 150–400)
PLATELET # BLD AUTO: 126 K/UL — LOW (ref 150–400)
PO2 BLDV: 19 MMHG — LOW (ref 25–45)
POIKILOCYTOSIS BLD QL AUTO: SLIGHT — SIGNIFICANT CHANGE UP
POLYCHROMASIA BLD QL SMEAR: SLIGHT — SIGNIFICANT CHANGE UP
POTASSIUM BLDV-SCNC: 4.1 MMOL/L — SIGNIFICANT CHANGE UP (ref 3.5–5.1)
POTASSIUM SERPL-MCNC: 4 MMOL/L — SIGNIFICANT CHANGE UP (ref 3.5–5.3)
POTASSIUM SERPL-SCNC: 4 MMOL/L — SIGNIFICANT CHANGE UP (ref 3.5–5.3)
PROT SERPL-MCNC: 7.3 G/DL — SIGNIFICANT CHANGE UP (ref 6–8.3)
RBC # BLD: 3.8 M/UL — SIGNIFICANT CHANGE UP (ref 3.8–5.2)
RBC # BLD: 3.91 M/UL — SIGNIFICANT CHANGE UP (ref 3.8–5.2)
RBC # FLD: 18.8 % — HIGH (ref 10.3–14.5)
RBC # FLD: 19.2 % — HIGH (ref 10.3–14.5)
RBC BLD AUTO: ABNORMAL
SAO2 % BLDV: 25.2 % — LOW (ref 67–88)
SODIUM SERPL-SCNC: 136 MMOL/L — SIGNIFICANT CHANGE UP (ref 135–145)
STOMATOCYTES BLD QL SMEAR: SLIGHT — SIGNIFICANT CHANGE UP
WBC # BLD: 8.67 K/UL — SIGNIFICANT CHANGE UP (ref 3.8–10.5)
WBC # BLD: 9.88 K/UL — SIGNIFICANT CHANGE UP (ref 3.8–10.5)
WBC # FLD AUTO: 8.67 K/UL — SIGNIFICANT CHANGE UP (ref 3.8–10.5)
WBC # FLD AUTO: 9.88 K/UL — SIGNIFICANT CHANGE UP (ref 3.8–10.5)

## 2024-04-02 PROCEDURE — 99291 CRITICAL CARE FIRST HOUR: CPT

## 2024-04-02 PROCEDURE — 74177 CT ABD & PELVIS W/CONTRAST: CPT | Mod: 26,MC

## 2024-04-02 PROCEDURE — 99285 EMERGENCY DEPT VISIT HI MDM: CPT | Mod: GC

## 2024-04-02 RX ORDER — IPRATROPIUM/ALBUTEROL SULFATE 18-103MCG
3 AEROSOL WITH ADAPTER (GRAM) INHALATION ONCE
Refills: 0 | Status: COMPLETED | OUTPATIENT
Start: 2024-04-02 | End: 2024-04-02

## 2024-04-02 RX ORDER — LEVOTHYROXINE SODIUM 125 MCG
87.5 TABLET ORAL
Refills: 0 | Status: DISCONTINUED | OUTPATIENT
Start: 2024-04-02 | End: 2024-04-22

## 2024-04-02 RX ORDER — ESCITALOPRAM OXALATE 10 MG/1
10 TABLET, FILM COATED ORAL
Refills: 0 | Status: DISCONTINUED | OUTPATIENT
Start: 2024-04-02 | End: 2024-05-01

## 2024-04-02 RX ORDER — LIDOCAINE 4 G/100G
1 CREAM TOPICAL AT BEDTIME
Refills: 0 | Status: DISCONTINUED | OUTPATIENT
Start: 2024-04-02 | End: 2024-05-01

## 2024-04-02 RX ORDER — LACTOBACILLUS ACIDOPHILUS 100MM CELL
1 CAPSULE ORAL DAILY
Refills: 0 | Status: DISCONTINUED | OUTPATIENT
Start: 2024-04-02 | End: 2024-04-03

## 2024-04-02 RX ORDER — MORPHINE SULFATE 50 MG/1
4 CAPSULE, EXTENDED RELEASE ORAL ONCE
Refills: 0 | Status: DISCONTINUED | OUTPATIENT
Start: 2024-04-02 | End: 2024-04-02

## 2024-04-02 RX ORDER — IPRATROPIUM/ALBUTEROL SULFATE 18-103MCG
3 AEROSOL WITH ADAPTER (GRAM) INHALATION EVERY 6 HOURS
Refills: 0 | Status: DISCONTINUED | OUTPATIENT
Start: 2024-04-02 | End: 2024-04-05

## 2024-04-02 RX ORDER — INSULIN LISPRO 100/ML
VIAL (ML) SUBCUTANEOUS EVERY 6 HOURS
Refills: 0 | Status: DISCONTINUED | OUTPATIENT
Start: 2024-04-02 | End: 2024-04-20

## 2024-04-02 RX ORDER — MEROPENEM 1 G/30ML
1000 INJECTION INTRAVENOUS EVERY 8 HOURS
Refills: 0 | Status: DISCONTINUED | OUTPATIENT
Start: 2024-04-02 | End: 2024-04-03

## 2024-04-02 RX ORDER — LANOLIN ALCOHOL/MO/W.PET/CERES
3 CREAM (GRAM) TOPICAL AT BEDTIME
Refills: 0 | Status: DISCONTINUED | OUTPATIENT
Start: 2024-04-02 | End: 2024-04-28

## 2024-04-02 RX ORDER — CHLORHEXIDINE GLUCONATE 213 G/1000ML
15 SOLUTION TOPICAL EVERY 12 HOURS
Refills: 0 | Status: DISCONTINUED | OUTPATIENT
Start: 2024-04-02 | End: 2024-05-01

## 2024-04-02 RX ORDER — ASCORBIC ACID 60 MG
500 TABLET,CHEWABLE ORAL DAILY
Refills: 0 | Status: DISCONTINUED | OUTPATIENT
Start: 2024-04-02 | End: 2024-05-01

## 2024-04-02 RX ORDER — SODIUM CHLORIDE 0.65 %
1 AEROSOL, SPRAY (ML) NASAL EVERY 12 HOURS
Refills: 0 | Status: DISCONTINUED | OUTPATIENT
Start: 2024-04-02 | End: 2024-05-01

## 2024-04-02 RX ORDER — VANCOMYCIN HCL 1 G
1000 VIAL (EA) INTRAVENOUS ONCE
Refills: 0 | Status: COMPLETED | OUTPATIENT
Start: 2024-04-02 | End: 2024-04-02

## 2024-04-02 RX ORDER — TOBRAMYCIN 0.3 %
2 DROPS OPHTHALMIC (EYE) EVERY 6 HOURS
Refills: 0 | Status: DISCONTINUED | OUTPATIENT
Start: 2024-04-02 | End: 2024-04-10

## 2024-04-02 RX ORDER — GABAPENTIN 400 MG/1
100 CAPSULE ORAL
Refills: 0 | Status: DISCONTINUED | OUTPATIENT
Start: 2024-04-02 | End: 2024-04-08

## 2024-04-02 RX ORDER — SIMETHICONE 80 MG/1
80 TABLET, CHEWABLE ORAL EVERY 12 HOURS
Refills: 0 | Status: DISCONTINUED | OUTPATIENT
Start: 2024-04-02 | End: 2024-04-15

## 2024-04-02 RX ORDER — INSULIN GLARGINE 100 [IU]/ML
7 INJECTION, SOLUTION SUBCUTANEOUS
Refills: 0 | Status: DISCONTINUED | OUTPATIENT
Start: 2024-04-02 | End: 2024-04-12

## 2024-04-02 RX ORDER — ACETYLCYSTEINE 200 MG/ML
4 VIAL (ML) MISCELLANEOUS ONCE
Refills: 0 | Status: COMPLETED | OUTPATIENT
Start: 2024-04-02 | End: 2024-04-02

## 2024-04-02 RX ORDER — SALIVA SUBSTITUTE COMB NO.11 351 MG
5 POWDER IN PACKET (EA) MUCOUS MEMBRANE EVERY 6 HOURS
Refills: 0 | Status: DISCONTINUED | OUTPATIENT
Start: 2024-04-02 | End: 2024-05-01

## 2024-04-02 RX ORDER — QUETIAPINE FUMARATE 200 MG/1
12.5 TABLET, FILM COATED ORAL
Refills: 0 | Status: DISCONTINUED | OUTPATIENT
Start: 2024-04-02 | End: 2024-04-19

## 2024-04-02 RX ORDER — ACETAMINOPHEN 500 MG
1000 TABLET ORAL ONCE
Refills: 0 | Status: COMPLETED | OUTPATIENT
Start: 2024-04-02 | End: 2024-04-02

## 2024-04-02 RX ORDER — AMLODIPINE BESYLATE 2.5 MG/1
10 TABLET ORAL
Refills: 0 | Status: DISCONTINUED | OUTPATIENT
Start: 2024-04-02 | End: 2024-04-05

## 2024-04-02 RX ORDER — PANTOPRAZOLE SODIUM 20 MG/1
40 TABLET, DELAYED RELEASE ORAL
Refills: 0 | Status: DISCONTINUED | OUTPATIENT
Start: 2024-04-02 | End: 2024-04-11

## 2024-04-02 RX ADMIN — Medication 2 DROP(S): at 23:51

## 2024-04-02 RX ADMIN — Medication 3 MILLILITER(S): at 17:48

## 2024-04-02 RX ADMIN — Medication 250 MILLIGRAM(S): at 16:51

## 2024-04-02 RX ADMIN — Medication 4 MILLILITER(S): at 17:47

## 2024-04-02 RX ADMIN — Medication 400 MILLIGRAM(S): at 23:52

## 2024-04-02 RX ADMIN — MEROPENEM 100 MILLIGRAM(S): 1 INJECTION INTRAVENOUS at 23:51

## 2024-04-02 RX ADMIN — MORPHINE SULFATE 4 MILLIGRAM(S): 50 CAPSULE, EXTENDED RELEASE ORAL at 17:48

## 2024-04-02 NOTE — ED ADULT NURSE REASSESSMENT NOTE - NS ED NURSE REASSESS COMMENT FT1
PT cleaned changed and turned. New prima fit placed on PT.
[id number] pt noted w/ sacral decubiti from home, dr salmon aware and family aware/st 4 noted pending eval

## 2024-04-02 NOTE — CONSULT NOTE ADULT - ASSESSMENT
74F w PMH T2DM, HTN, HLD, anemia, hypothyroidism, RA, fibromyalgia, remote cerebral aneurysm repair, acute hypercapnic respiratory failure now with tracheostomy, PEG tube (initially placed 2022 at OSH), and bedbound (trach change 8/18, PEG change 8/14/2023), c. diff currently being treated, presents with dislodged PEG tube for last 3 days. PEG balloon is in SQ tissue with surrounding inflammatory changes.     Plan/Recs  - Bedside Tube study  - If no contrast in bedside tube study seen outside of PEG tube track, will need tube exchanged over wire with IR.    Patient discussed with Attending Surgeon Dr. Coello  ACS/Trauma Surgery  [id number]

## 2024-04-02 NOTE — H&P ADULT - ASSESSMENT
75 yo F with complex PMH significant for RA, hypercarbic respiratory s/p Trach and PEG, ventilator dependent, T2DM, HTN, C-difficile presented with dislodged PEG with site concerning for cellulitis.         Neuro:  -Alert, able to mouth words, bedbound at baseline  -Continue routine neuro checks  -Currently holding Seroquel, Gabapentin as patient NPO  -Continue Fentanyl patch  -Tylenol IV, Morphine prn pain as needed  -Continue Lidocaine patch for pain        HEENT:   -Continue Tobramycin eye drops for home  -Continue Artificial Tears  -[place] in right ear per patient's son        Pulm:  -Trach to vent--8XLT Shiley at bedside  -Continue mechanical ventilation--12/300/30/5  -Continuous pulse oximetry  -Duoneb prn  -Trach care, suction prn        CV: HTN   -Hold Amlodipine as NPO  -VS q1h  -Treat SBP > 170 with Labetalol as needed  -Telemetry monitoring--sinus rhythm      GI:  -PEG tube dislodged--currently NPO  -GI and Surgery consulted  -Gastrograffin study as per Surgery  -Consider IR consult  in AM for replacement based on PEG study  -Hold Bowel regimen and tube feeds at this time  -Stool count    Renal:  -No acute issues  -Trend Cr daily  -Optimize electrolytes  -Intake and output per routine      ID:   * Concern for cellulitis at PEG site  --Will send blood cultures x 2  --Meropenem for now (s/p Vanco x 1 in ED)  --Will send MRSA swab  --Serial abdominal exams  --Consider ID consult in AM    *C diff (being treated outpt by Dr Newman)  --Contact Precautions  --Hold Fidaxomicin for now as NPO (per family can only be treated with Fidaxomicin and Vanco PO)  --Allergy to Flagyl   --Stool Count  --Consider ID consult in AM    *UTI--previous VRE  --Will send UA as reportedly with UTI recently outpatient    *History of CRE Pseudomonas Pna  --no s/s of active pna at present        Endo:  T2DM  --Lantus 15 units daily---ordered for 7units as patient NPO  --Hold Novolin as NPO--normally takes 12 units at noon, 6 units at 6Am and 6PM  --ISS q6H    Hypothyroid:  --Synthroid 125mcg PO daily, convert to IV for now    Consider Endo consult for further management      Immune: Hx of RA  -Continue Prednsione 5mg qd  --If unable to use PEG tomorrow, will convert to IV       PPX:  PAS, Protonix (home med)      GOC: Full Code. Next of kin/primary decision maker is daughter Marysol [phone]   75 yo F with complex PMH significant for RA, hypercarbic respiratory s/p Trach and PEG, ventilator dependent, T2DM, HTN, C-difficile presented with dislodged PEG with site concerning for cellulitis.         Neuro:  -Alert, able to mouth words, bedbound at baseline  -Continue routine neuro checks  -Currently holding Seroquel, Gabapentin as patient NPO  -Continue Fentanyl patch  -Tylenol IV, Morphine prn pain as needed  -Continue Lidocaine patch for pain        HEENT:   -Continue Tobramycin eye drops for home  -Continue Artificial Tears  -[place] in right ear per patient's son        Pulm:  -Trach to vent--8XLT Shiley at bedside  -Continue mechanical ventilation--12/300/30/5  -Continuous pulse oximetry  -Duoneb prn  -Trach care, suction prn        CV:  *HTN  -Hold Amlodipine as NPO  -VS q1h  -Treat SBP > 170 with Labetalol as needed  -Telemetry monitoring--sinus rhythm    *Hx of HLD  -Send Lipid Panel      GI:  -PEG tube dislodged--currently NPO  -GI and Surgery consulted  -Gastrograffin study as per Surgery  -Consider IR consult  in AM for replacement based on PEG study  -Hold Bowel regimen and tube feeds at this time  -Stool count    Renal:  -No acute issues  -Trend Cr daily  -Optimize electrolytes  -Intake and output per routine      ID:   * Concern for cellulitis at PEG site  --Will send blood cultures x 2  --Meropenem for now (s/p Vanco x 1 in ED)  --Will send MRSA swab  --Procalcitonin  --Serial abdominal exams  --Consider ID consult in AM    *C diff (being treated outpt by Dr Newman)  --Contact Precautions  --Hold Fidaxomicin for now as NPO (per family can only be treated with Fidaxomicin and Vanco PO)  --Allergy to Flagyl   --Stool Count  --Consider ID consult in AM    *UTI--previous VRE  --Will send UA as reportedly with UTI recently outpatient    *History of CRE Pseudomonas Pna  --no s/s of active pna at present        Endo:  T2DM  --Lantus 15 units daily---ordered for 7units as patient NPO  --Hold Novolin as NPO--normally takes 12 units at noon, 6 units at 6Am and 6PM  --ISS q6H  --Send A1C    Hypothyroid:  --Synthroid 125mcg PO daily, convert to IV for now    Consider Endo consult for further management      Immune: Hx of RA  -Continue Prednsione 5mg qd  --If unable to use PEG tomorrow, will convert to IV     Skin:     *Small Sacral Ulcer present on admit  --Consider Wound Consult  --Turn and position q2h and prn    *IAD  --Pericare as needed      PPX:  PAS (allergy to heparin), Protonix (home med)      GOC: Full Code. Next of kin/primary decision maker is daughter Marysol [phone]   75 yo F with complex PMH significant for RA, hypercarbic respiratory s/p Trach and PEG, ventilator dependent, T2DM, HTN, C-difficile presented with dislodged PEG with site concerning for cellulitis.         Neuro:  -Alert, able to mouth words, bedbound at baseline  -Continue routine neuro checks  -Currently holding Seroquel, Gabapentin as patient NPO  -Continue Fentanyl patch  -Tylenol IV, Morphine prn pain as needed  -Continue Lidocaine patch for pain        HEENT:   -Continue Tobramycin eye drops for home  -Continue Artificial Tears  -[place] in right ear per patient's son        Pulm:  -Trach to vent--8XLT Sh[place] at bedside  -Continue mechanical ventilation--12/300/30/5  -Continuous pulse oximetry  -Duoneb prn  -Trach care, suction prn        CV:  *HTN  -Hold Amlodipine as NPO  -VS q1h  -Treat SBP > 170 with Labetalol as needed  -Telemetry monitoring--sinus rhythm    *Hx of HLD  -Send Lipid Panel      GI:  -PEG tube dislodged--currently NPO  -GI and Surgery consulted  -Gastrograffin study as per Surgery  -Consider IR consult  in AM for replacement based on PEG study  -Hold Bowel regimen and tube feeds at this time  -Stool count    Renal:  -No acute issues  -Trend Cr daily  -Optimize electrolytes  -Intake and output per routine      ID:   * Concern for cellulitis at PEG site  --Will send blood cultures x 2  --Meropenem for now (s/p Vanco x 1 in ED)  --Will send MRSA swab  --Procalcitonin  --Serial abdominal exams  --Consider ID consult in AM    *C diff (being treated outpt by Dr Newman)  --Contact Precautions  --Hold Fidaxomicin for now as NPO (per family can only be treated with Fidaxomicin and Vanco PO)  --Allergy to Flagyl   --Stool Count  --Consider ID consult in AM    *UTI--previous VRE  --Will send UA as reportedly with UTI recently outpatient    *History of CRE Pseudomonas Pna  --no s/s of active pna at present        Endo:  T2DM  --Lantus 15 units daily---ordered for 7units as patient NPO  --Hold Novolin as NPO--normally takes 12 units at noon, 6 units at 6Am and 6PM  --ISS q6H  --Send A1C    Hypothyroid:  --Synthroid 125mcg PO daily, convert to IV for now    Consider Endo consult for further management      Immune: Hx of RA  -Continue Prednsione 5mg qd  --If unable to use PEG tomorrow, will convert to IV     Skin:     *Small Sacral Ulcer present on admit  --Consider Wound Consult  --Turn and position q2h and prn    *IAD  --Pericare as needed      PPX:  PAS (allergy to heparin), Protonix (home med)      GOC: DNR. Next of kin/primary decision maker is daughter Marysol [phone]   73 yo F with complex PMH significant for RA, hypercarbic respiratory s/p Trach and PEG, ventilator dependent, T2DM, HTN, C-difficile presented with dislodged PEG with site concerning for cellulitis.         Neuro:  -Alert, able to mouth words, bedbound at baseline  -Continue routine neuro checks  -Currently holding Seroquel, Gabapentin as patient NPO  -Continue Fentanyl patch  -Tylenol IV, Morphine prn pain as needed  -Continue Lidocaine patch for pain        HEENT:   -Continue Tobramycin eye drops for home  -Continue Artificial Tears  -[place] in right ear per patient's son        Pulm:  *Chronic Hypoxemic/Hypercapnic Respiratory Failure  -Trach to vent--8XLT Shiley at bedside  -Continue mechanical ventilation--12/300/30/5  -Continuous pulse oximetry  -Duoneb prn  -Trach care, suction prn        CV:  *HTN  -Hold Amlodipine as NPO  -VS q1h  -Treat SBP > 170 with Labetalol as needed  -Telemetry monitoring--sinus rhythm    *Hx of HLD  -Send Lipid Panel      GI:  -PEG tube dislodged--currently NPO  -GI and Surgery consulted  -Gastrograffin study as per Surgery  -Consider IR consult  in AM for replacement based on PEG study  -Hold Bowel regimen and tube feeds at this time  -Stool count    Renal:  -No acute issues  -Trend Cr daily  -Optimize electrolytes  -Intake and output per routine      ID:   * Concern for cellulitis at PEG site  --Will send blood cultures x 2  --Meropenem for now (s/p Vanco x 1 in ED)  --Will send MRSA swab  --Procalcitonin  --Serial abdominal exams  --Consider ID consult in AM    *C diff (being treated outpt by Dr Newman)  --Contact Precautions  --Hold Fidaxomicin for now as NPO (per family can only be treated with Fidaxomicin and Vanco PO)  --Allergy to Flagyl   --Stool Count  --Consider ID consult in AM    *UTI--previous VRE  --Will send UA as reportedly with UTI recently outpatient    *History of CRE Pseudomonas Pna  --no s/s of active pna at present        Endo:  T2DM  --Lantus 15 units daily---ordered for 7units as patient NPO  --Hold Novolin as NPO--normally takes 12 units at noon, 6 units at 6Am and 6PM  --ISS q6H  --Send A1C    Hypothyroid:  --Synthroid 125mcg PO daily, convert to IV for now    Consider Endo consult for further management      Immune: Hx of RA  -Continue Prednsione 5mg qd  --If unable to use PEG tomorrow, will convert to IV     Skin:     *Small Sacral Ulcer present on admit  --Consider Wound Consult  --Turn and position q2h and prn    *IAD  --Pericare as needed      PPX:  PAS (allergy to heparin), Protonix (home med)      GOC: DNR. Next of kin/primary decision maker is daughter Marysol [phone]   75 yo F with complex PMH significant for RA, hypercarbic respiratory s/p Trach and PEG, ventilator dependent, T2DM, HTN, C-difficile presented with dislodged PEG with site concerning for cellulitis.         Neuro:  -Alert, able to mouth words, bedbound at baseline  -Continue routine neuro checks  -Currently holding Seroquel, Gabapentin as patient NPO  -Continue Fentanyl patch  -Tylenol IV, Morphine prn pain as needed  -Continue Lidocaine patch for pain        HEENT:   -Continue Tobramycin eye drops for home  -Continue Artificial Tears  -[place] in right ear per patient's son        Pulm:  *Chronic Hypoxemic/Hypercapnic Respiratory Failure  -Trach to vent--8XLT Sh[place] at bedside  -Continue mechanical ventilation--12/300/30/5  -Continuous pulse oximetry  -Duoneb prn  -Trach care, suction prn        CV:  *HTN  -Hold Amlodipine as NPO  -VS q1h  -Treat SBP > 170 with Labetalol as needed  -Telemetry monitoring--sinus rhythm    *Hx of HLD  -Send Lipid Panel      GI:  -PEG tube dislodged--currently NPO  -GI and Surgery consulted  -Gastrograffin study as per Surgery  -Consider IR consult  in AM for replacement based on PEG study  -Hold Bowel regimen and tube feeds at this time  -Stool count    Renal:  -No acute issues  -Trend Cr daily  -Optimize electrolytes  -Intake and output per routine      ID:   * Concern for cellulitis at PEG site  --Will send blood cultures x 2  --Meropenem for now (s/p Vanco x 1 in ED)  --Will send MRSA swab  --Procalcitonin  --Serial abdominal exams  --Consider ID consult in AM    *C diff (being treated outpt by Dr Newman)  --Contact Precautions  --Hold Fidaxomicin for now as NPO (per family can only be treated with Fidaxomicin and Vanco PO)  --Allergy to Flagyl   --Stool Count  -- ID consult in AM    *UTI--previous VRE  --Will send UA as reportedly with UTI recently outpatient    *History of CRE Pseudomonas Pna  --no s/s of active pna at present        Endo:  T2DM  --Lantus 15 units daily---ordered for 7units as patient NPO  --Hold Novolin as NPO--normally takes 12 units at noon, 6 units at 6Am and 6PM  --ISS q6H  --Send A1C    Hypothyroid:  --Synthroid 125mcg PO daily, convert to IV for now    Consider Endo consult for further management      Immune: Hx of RA  -Continue Prednsione 5mg qd  --If unable to use PEG tomorrow, will convert to IV     Skin:     *Small Sacral Ulcer present on admit  --Consider Wound Consult  --Turn and position q2h and prn    *IAD  --Pericare as needed      PPX:  PAS (allergy to heparin), Protonix (home med)      GOC: DNR. Next of kin/primary decision maker is daughter Marysol [phone]

## 2024-04-02 NOTE — H&P ADULT - GASTROINTESTINAL COMMENTS
Redness around PEG site, small amount of pus at inner insertion site, tender to palpation around PEG site nods head 'yes' to tenderness/pain at PEG site/ grimaces to palpation

## 2024-04-02 NOTE — ED PROVIDER NOTE - PROGRESS NOTE DETAILS
Patient signed out to me by the prior attending.  The patient's disposition was discussed with the treating team and agreed upon.  Obie Bustillo M.D. (attending)- Patient with G-tube partially pulled with surrounding cellulitis on treatment for C-diff, will obtain a MRSA swab, add vancomycin and flagyl, surgery to be consulted, limited IR evaluation due to patient on vent at this time and resources limited, patient will require admission for exchange and treatment of abdominal wall cellulitis Marcie Brice (Rodriguez) PGY3 spoke to GI fellow, unlikely to be correctable via endoscopy given placement of balloon. surgery consulted and will eval. Marcie Brice (Rodriguez) PGY3 surgery at bedside. Marcie Brice (Rodriguez) PGY3 accepted to dr. gottlieb service pending possible surgical correction. Marcie Brice (Rodriguez) PGY3 accepted to dr. gottlieb service pending possible surgical correction.  20:05 Will change admission from Dr. Daniel due to vent dependance

## 2024-04-02 NOTE — H&P ADULT - REASON FOR ADMISSION
reaction with initial treatment and there are no barriers to learning. Demonstrates no mental or cognitive disorder. Plan of Care/Treatment to date:  [x] Therapeutic Exercise  [] Modalities:  [x] Therapeutic Activity     [] Ultrasound  [] Electrical Stimulation  [] Gait Training      [] Cervical Traction [] Lumbar Traction  [x] Neuromuscular Re-education    [] Cold/hotpack [] Iontophoresis   [x] Instruction in HEP      [] Vasopneumatic     [] Manual Therapy               [] Aquatic Therapy       Other:   Group bariatric education class        Frequency/Duration:  # Days per week: [x] 1 day # Weeks: [] 1 week [] 5 weeks     [] 2 days   [] 2 weeks [x] 6 weeks     [] 3 days   [] 3 weeks [] 7 weeks     [] 4 days   [] 4 weeks [] 8 weeks         [] 9 weeks [] 10 weeks         [] 11 weeks [] 12 weeks    Rehab Potential/Progress: [] Excellent [x] Good [] Fair  [] Poor     Goals:      Short term goals  Time Frame for Short term goals: 4 sessions  Short term goal 1: Pt will be able to find some good exercises she can string together at least 15-20 mins of exercise w/o pain or limitation  Long term goals  Time Frame for Long term goals : 6-8 sessions  Long term goal 1: Pt will be independent w/ HEP w/ min or limitation in order to help bariatric program success  Long term goal 2: Utilizing group and individual instruction, patient will be educated in physical activity/ exercise concepts including use of basic fitness equipment and developing a personal exercise program      Electronically signed by:  Nicolette Rai, PT, MPT, ATC  9/10/2020, 6:34 PM    9/10/2020, 6:34 PM  If you have any questions or concerns, please don't hesitate to call.   Thank you for your referral.      Physician Signature:________________________________Date:_________ TIME: _____  By signing above, therapists plan is approved by physician
Dislodged G Tube

## 2024-04-02 NOTE — ED ADULT NURSE NOTE - NSFALLHARMRISKINTERV_ED_ALL_ED

## 2024-04-02 NOTE — ED PROVIDER NOTE - ATTENDING CONTRIBUTION TO CARE
This is 74-year-old female with a history of pneumonia causing ICU stay and trach and PEG about 2 or 3 years ago.  She pulled out her PEG which caused her daughter to come to the hospital.  Notably she also has a sacral wound it has been healing for months.  Awake alert responding appropriately.  Trach and PEG in place.  There is 3 cm of erythema tenderness to the right and above the and about 5 to 8 cm to the lateral/left.  There is diffuse abdominal tenderness with no rebound or guarding.  The PEG is in place and when pulled appears to be held in place by the balloon.  Discussed with respiratory therapy to ensure that the trach is in place and this inflated as appropriate based on the patient's daughter's request.  CT abdomenw iv contrast to determine if there is any necrotizing soft tissue infection or if there is any complicating factors associated with the PEG tube that had come out.  Right now the PEG tube is in place.  Will do CBC CMP and reassess the patient.  Notably the patient also has a sacral wound it has been in place for many months.

## 2024-04-02 NOTE — H&P ADULT - NS ATTEND AMEND GEN_ALL_CORE FT
care provided 4/2/24    Patient seen and examined.  Agree with NP note as above.  Patient with hx as noted including chronic resp failure with trach/vent/PEG, RA on prednisone 5mg daily, hypothyroid, long admit Oct'23 thru Jan '24, cdiff on fidoxamicin care provided 4/2/24    Patient seen and examined.  Agree with NP note as above.  Patient with hx as noted including chronic resp failure with trach/vent/PEG, RA on prednisone 5mg daily, hypothyroid, long admit Oct'23 thru Jan '24, cdiff on fidoxamicin who presents to ER with dislodged PEG and associated abdominal pain and skin breakdown concerning for abdominal cellulitis.  Patient admitted in that setting.    Will maintain on chronic vent settings, keep NPO and transition to IV meds as we are best able.  PEG tube study and then will require IR vs surgical correction of dislodged PEG.  Will follow cx and give boone for now for abdominal skin pathology.  Unable to give cdiff meds at this time (allergic to IV flagyl).  Consult ID in AM.  Restart fidoxamicin as soon as PEG is replaced.  Continue home prednisone.  DNR.    Rest of plan as above and I have edited as appropriate.

## 2024-04-02 NOTE — ED ADULT NURSE NOTE - OBJECTIVE STATEMENT
Pt brought in by ambulance accompanied by daughter due to a possible Jtube dislodgement. Pt is alert and awake. Pt is diagnosed with Cdiff, UTI March 20. Pt is in no distress at this time. Pt has trach to vent. Pt brought in by ambulance accompanied by daughter due to a possible Jtube dislodgement. Pt is alert and awake. Pt is diagnosed with Cdiff, UTI March 20. Pt is in no distress at this time. Pt has trach to vent with setting of 30% Peep of 5; RR 12; volume 300. Pt's breathing easy and non labored. Pt calm at this time. Pt's daughter states that she noticed a rash around the Jtube yesterday. Pt's daughter states that there is an LPN that comes to the house to care for her mother.

## 2024-04-02 NOTE — H&P ADULT - NSHPSOURCEINFOTX_GEN_ALL_CORE
Primary Contact: Marysol (daughter) [phone]; Son Gerson at bedside provided additional information

## 2024-04-02 NOTE — H&P ADULT - NSICDXPASTMEDICALHX_GEN_ALL_CORE_FT
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

## 2024-04-02 NOTE — H&P ADULT - MOTOR
Able to squeeze with bilateral upper extremities, cannot live arms  Unable to move lower extremities, bilateral contraction

## 2024-04-02 NOTE — H&P ADULT - HISTORY OF PRESENT ILLNESS
73 yo F PMH T2DM on insulin, HTN, HLD, hypothyroidism, RA on Prednisone, Fibromyalgia, cerebral aneurysm s/p repair, acute hypercapnic respiratory failure s/p trach and PEG (10/22) presented from home with complaints of possible G tube dislodgement and redness around the site.  Site is red with small amount of pus at insertion site.  Tender to palpation, warm to touch. CT Abdomen/Pelvis done showing dislodgement of G tube with balloon in the anterior abdominal wall with air filled track to stomach.  GI contacted, but unable to replace at bedside given placement of balloon. Surgery consulted--recommend gastrograffin study to eval placement of PEG and possible IR replacement in AM. Given Vancomycin 1 gm IV x 1 due to concern for possible cellulitis.  Of note, patient hemodynamically stable, afebrile, without leukocytosis on presentation.     Per patient's son at bedside, patient currently being treated for C-diff.  Known infection for past 2-3 weeks.  Treated by Dr. Zion Newman, currently receiving Fidaxomicin.  Also report recent UTI--not currently being treated.    Admitted to MICU for ventilator support and treatment of dislodged PEG/abdominal wall cellulitis.  75 yo F PMH T2DM on insulin, HTN, HLD, hypothyroidism, RA on Prednisone, Fibromyalgia, cerebral aneurysm s/p repair, acute hypercapnic respiratory failure s/p trach (vent dependent) and PEG (10/22), bedbound presented from home with complaints of possible G tube dislodgement and redness around the site.  Site is red with small amount of pus at insertion site.  Tender to palpation, warm to touch. CT Abdomen/Pelvis done showing dislodgement of G tube with balloon in the anterior abdominal wall with air filled track to stomach.  GI contacted, but unable to replace at bedside given placement of balloon. Surgery consulted--recommend gastrograffin study to eval placement of PEG and possible IR replacement in AM. Given Vancomycin 1 gm IV x 1 due to concern for possible cellulitis.  Of note, patient hemodynamically stable, afebrile, without leukocytosis on presentation.     Per patient's son at bedside, patient currently being treated for C-diff.  Known infection for past 2-3 weeks.  Treated by Dr. Zion Newman, currently receiving Fidaxomicin.  Also report recent UTI--not currently being treated.    Admitted to MICU for ventilator support and treatment of dislodged PEG/abdominal wall cellulitis.

## 2024-04-02 NOTE — CONSULT NOTE ADULT - SUBJECTIVE AND OBJECTIVE BOX
SURGERY CONSULT NOTE    Patient is a 74y old  Female who presents with a chief complaint of pain at PEG site    HPI:  74F w PMH T2DM, HTN, HLD, anemia, hypothyroidism, RA, fibromyalgia, remote cerebral aneurysm repair, acute hypercapnic respiratory failure now with tracheostomy, PEG tube (initially placed 2022 at OSH), and bedbound (trach change 8/18, PEG change 8/14/2023), c. diff currently being treated, presents to the ED with three days of pain at PEG site. The family noticed the surrounding skin becoming erythematous this morning prompting presentation to the ED. Patient has been tolerating tube feeds, last feed this morning. Patient occasionally has leaking from tube site during feeds, but is largely positional according to her daughter. CT in the ED shows the PEG balloon in the SQ tissue with surrounding inflammatory changes. No free air/fluid. WBC 10    PAST MEDICAL & SURGICAL HISTORY:  Diabetes  Rheumatoid arthritis  Fibromyalgia  Hypothyroid  Hypertension  H/O tracheostomy  PEG (percutaneous endoscopic gastrostomy) status    [  ] No significant past history as reviewed with the patient and family    FAMILY HISTORY:    [  ] Family history not pertinent as reviewed with the patient and family    SOCIAL HISTORY:    MEDICATIONS  (STANDING):  chlorhexidine 0.12% Liquid 15 milliLiter(s) Oral Mucosa every 12 hours    MEDICATIONS  (PRN):    Allergies    pineapple (Unknown)  Tagamet (Unknown)  heparin (Unknown)  walnut (Unknown)  penicillin (Unknown)  Lyrica (Unknown)  Pecan, Filbert, Hazelnut (Unknown)    Intolerances        Vital Signs Last 24 Hrs  T(C): 37.3 (02 Apr 2024 11:26), Max: 37.3 (02 Apr 2024 11:26)  T(F): 99.2 (02 Apr 2024 11:26), Max: 99.2 (02 Apr 2024 11:26)  HR: 68 (02 Apr 2024 15:25) (68 - 95)  BP: 142/69 (02 Apr 2024 11:26) (142/69 - 142/69)  BP(mean): --  RR: 25 (02 Apr 2024 11:26) (25 - 25)  SpO2: 96% (02 Apr 2024 15:25) (96% - 98%)    Parameters below as of 02 Apr 2024 11:26  Patient On (Oxygen Delivery Method): high frequency ventilator      Daily Height in cm: 149.86 (02 Apr 2024 11:26)    Daily     PHYSICAL EXAM  General: No acute distress, resting comfortably in bed  HEENT: Mechanical ventilation via tracheostomy  Abdomen: soft, nondistended, nontender, PEG tube site with mild surrounding erythema, no purulent drainage, surrounding skin TTP, Skin firm to palpation below directly at the tube site.                           9.7    9.88  )-----------( 126      ( 02 Apr 2024 12:52 )             33.4     04-02    136  |  99  |  22  ----------------------------<  155<H>  4.0   |  26  |  <0.30<L>    Ca    9.3      02 Apr 2024 12:52    TPro  7.3  /  Alb  3.3  /  TBili  0.4  /  DBili  x   /  AST  19  /  ALT  20  /  AlkPhos  49  04-02      Urinalysis Basic - ( 02 Apr 2024 12:52 )    Color: x / Appearance: x / SG: x / pH: x  Gluc: 155 mg/dL / Ketone: x  / Bili: x / Urobili: x   Blood: x / Protein: x / Nitrite: x   Leuk Esterase: x / RBC: x / WBC x   Sq Epi: x / Non Sq Epi: x / Bacteria: x        IMAGING STUDIES:    < from: CT Abdomen and Pelvis w/ IV Cont (04.02.24 @ 15:22) >  PROCEDURE:  CT of the Abdomen and Pelvis was performed.  Sagittal and coronal reformats were performed.    FINDINGS:  LOWER CHEST: Within normal limits.    LIVER: Within normal limits.  BILE DUCTS: Normal caliber.  GALLBLADDER: Within normal limits.  SPLEEN: Within normal limits.  PANCREAS: Within normal limits.  ADRENALS: Within normal limits.  KIDNEYS/URETERS: Within normal limits.    BLADDER: Within normal limits.  REPRODUCTIVE ORGANS: Stable 2.5 cm right adnexal cyst.    BOWEL: No bowel obstruction. Appendix is not visualized. Dislodged PEG   tube with balloon in the anterior abdominal wall with air-filled tract to   stomach.  PERITONEUM: No ascites.  VESSELS: Atherosclerotic changes.  RETROPERITONEUM/LYMPH NODES: No lymphadenopathy.  ABDOMINAL WALL: Stable skin thickening along the gluteal cleft.  BONES: Degenerative changes.    IMPRESSION:  Dislodged PEG tube with balloon in the anterior abdominal wall with   air-filled tract to stomach.    < end of copied text >

## 2024-04-02 NOTE — ED PROVIDER NOTE - PHYSICAL EXAMINATION
General: non-toxic, NAD  HEENT: NCAT, PERRL, trach site clean dry and intact connected to vent.   Cardiac: RRR, no murmurs, 2+ peripheral pulses  Resp: CTAB  Abdomen: soft, non-distended, bowel sounds present, +ttp diffusely, g-tube securely in abdomen, though long tail unsure of dislodgement, no rebound or guarding. no organomegaly  Extremities: no peripheral edema, calf tenderness, or leg size discrepancies  Skin: sacral decubitus ulcer.   Neuro: awake and alert, moving upper extremities. contracted lower extremities.   Psych: mood and affect appropriate

## 2024-04-02 NOTE — ED PROVIDER NOTE - DATE/TIME 1
MA Rooming Questions  Patient: Placido Jones  MRN: [id number]    Date: 9/28/2022        1. Do you have any new issues?   no         2. Do you need any refills on medications?    no    3. Have you had any imaging done since your last visit? yes - CT    4. Have you been hospitalized or seen in the emergency room since your last visit here?   no    5. Did the patient have a depression screening completed today?  Yes    PHQ-9 Total Score: 3 (9/28/2022  1:35 PM)  Thoughts that you would be better off dead, or of hurting yourself in some way: 0 (9/28/2022  1:35 PM)       PHQ-9 Given to (if applicable):               PHQ-9 Score (if applicable):                     [] Positive     []  Negative              Does question #9 need addressed (if applicable)                     [] Yes    []  No               Anu Ignacio CMA 02-Apr-2024 16:23

## 2024-04-02 NOTE — ED PROVIDER NOTE - CLINICAL SUMMARY MEDICAL DECISION MAKING FREE TEXT BOX
elderly female trach and g-tube dependent s/p complicated pneumonia in 2021 presents with daughter for concerns of expanding erythema around the PEG tube and concern for dislodgement. pt is being actively treated for Cdiff, and has concomitant UTI, for which additional antibiotics are being deferred per ID specialist for fear of worsening c.diff infection. pt on fidoxamicin. tolerated G-tube feeds yesterday, but paused today. persistent large volume diarrhea. no chest pain or shortness of breath. PE with intact trach and peg with surrounding induration without underlying fluctuance or crepitance. obtain CTAP for eval of depth of g-tube and diffuse abd ttp. hold feeds. broad infectious workup given polymicrobial infections with persistent fever. likely admit v replace PEG at bedside.

## 2024-04-02 NOTE — H&P ADULT - NSHPPHYSICALEXAM_GEN_ALL_CORE
ICU Vital Signs Last 24 Hrs  T(C): 37.8 (02 Apr 2024 21:37), Max: 37.8 (02 Apr 2024 21:37)  T(F): 100 (02 Apr 2024 21:37), Max: 100 (02 Apr 2024 21:37)  HR: 85 (02 Apr 2024 21:37) (68 - 95)  BP: 163/72 (02 Apr 2024 21:37) (131/70 - 163/72)  BP(mean): 104 (02 Apr 2024 21:37) (88 - 104)  ABP: --  ABP(mean): --  RR: 26 (02 Apr 2024 21:37) (20 - 26)  SpO2: 100% (02 Apr 2024 21:37) (96% - 100%)    O2 Parameters below as of 02 Apr 2024 21:37      O2 Concentration (%): 30

## 2024-04-03 ENCOUNTER — APPOINTMENT (OUTPATIENT)
Dept: ENDOCRINOLOGY | Facility: CLINIC | Age: 74
End: 2024-04-03

## 2024-04-03 DIAGNOSIS — T85.528A DISPLACEMENT OF OTHER GASTROINTESTINAL PROSTHETIC DEVICES, IMPLANTS AND GRAFTS, INITIAL ENCOUNTER: ICD-10-CM

## 2024-04-03 DIAGNOSIS — J96.12 CHRONIC RESPIRATORY FAILURE WITH HYPERCAPNIA: ICD-10-CM

## 2024-04-03 DIAGNOSIS — S31.000A UNSPECIFIED OPEN WOUND OF LOWER BACK AND PELVIS WITHOUT PENETRATION INTO RETROPERITONEUM, INITIAL ENCOUNTER: ICD-10-CM

## 2024-04-03 DIAGNOSIS — A04.72 ENTEROCOLITIS DUE TO CLOSTRIDIUM DIFFICILE, NOT SPECIFIED AS RECURRENT: ICD-10-CM

## 2024-04-03 LAB
ADD ON TEST-SPECIMEN IN LAB: SIGNIFICANT CHANGE UP
ALBUMIN SERPL ELPH-MCNC: 3.5 G/DL — SIGNIFICANT CHANGE UP (ref 3.3–5)
ALP SERPL-CCNC: 49 U/L — SIGNIFICANT CHANGE UP (ref 40–120)
ALT FLD-CCNC: 20 U/L — SIGNIFICANT CHANGE UP (ref 10–45)
ANION GAP SERPL CALC-SCNC: 11 MMOL/L — SIGNIFICANT CHANGE UP (ref 5–17)
APPEARANCE UR: CLEAR — SIGNIFICANT CHANGE UP
APTT BLD: 28.3 SEC — SIGNIFICANT CHANGE UP (ref 24.5–35.6)
AST SERPL-CCNC: 17 U/L — SIGNIFICANT CHANGE UP (ref 10–40)
BACTERIA # UR AUTO: ABNORMAL /HPF
BILIRUB SERPL-MCNC: 0.6 MG/DL — SIGNIFICANT CHANGE UP (ref 0.2–1.2)
BILIRUB UR-MCNC: NEGATIVE — SIGNIFICANT CHANGE UP
BLD GP AB SCN SERPL QL: POSITIVE — SIGNIFICANT CHANGE UP
BUN SERPL-MCNC: 14 MG/DL — SIGNIFICANT CHANGE UP (ref 7–23)
CALCIUM SERPL-MCNC: 9.5 MG/DL — SIGNIFICANT CHANGE UP (ref 8.4–10.5)
CAST: 0 /LPF — SIGNIFICANT CHANGE UP (ref 0–4)
CHLORIDE SERPL-SCNC: 99 MMOL/L — SIGNIFICANT CHANGE UP (ref 96–108)
CO2 SERPL-SCNC: 25 MMOL/L — SIGNIFICANT CHANGE UP (ref 22–31)
COLOR SPEC: YELLOW — SIGNIFICANT CHANGE UP
CREAT SERPL-MCNC: <0.3 MG/DL — LOW (ref 0.5–1.3)
DIFF PNL FLD: ABNORMAL
EGFR: 113 ML/MIN/1.73M2 — SIGNIFICANT CHANGE UP
GLUCOSE BLDC GLUCOMTR-MCNC: 131 MG/DL — HIGH (ref 70–99)
GLUCOSE BLDC GLUCOMTR-MCNC: 131 MG/DL — HIGH (ref 70–99)
GLUCOSE BLDC GLUCOMTR-MCNC: 135 MG/DL — HIGH (ref 70–99)
GLUCOSE BLDC GLUCOMTR-MCNC: 173 MG/DL — HIGH (ref 70–99)
GLUCOSE SERPL-MCNC: 142 MG/DL — HIGH (ref 70–99)
GLUCOSE UR QL: NEGATIVE MG/DL — SIGNIFICANT CHANGE UP
INR BLD: 1.27 RATIO — HIGH (ref 0.85–1.18)
KETONES UR-MCNC: ABNORMAL MG/DL
LEUKOCYTE ESTERASE UR-ACNC: ABNORMAL
MAGNESIUM SERPL-MCNC: 1.9 MG/DL — SIGNIFICANT CHANGE UP (ref 1.6–2.6)
MRSA PCR RESULT.: DETECTED
NITRITE UR-MCNC: POSITIVE
PH UR: 8 — SIGNIFICANT CHANGE UP (ref 5–8)
PHOSPHATE SERPL-MCNC: 3.1 MG/DL — SIGNIFICANT CHANGE UP (ref 2.5–4.5)
POTASSIUM SERPL-MCNC: 3.6 MMOL/L — SIGNIFICANT CHANGE UP (ref 3.5–5.3)
POTASSIUM SERPL-SCNC: 3.6 MMOL/L — SIGNIFICANT CHANGE UP (ref 3.5–5.3)
PROCALCITONIN SERPL-MCNC: 0.08 NG/ML — SIGNIFICANT CHANGE UP (ref 0.02–0.1)
PROT SERPL-MCNC: 7.1 G/DL — SIGNIFICANT CHANGE UP (ref 6–8.3)
PROT UR-MCNC: SIGNIFICANT CHANGE UP MG/DL
PROTHROM AB SERPL-ACNC: 13.2 SEC — HIGH (ref 9.5–13)
RBC CASTS # UR COMP ASSIST: 3 /HPF — SIGNIFICANT CHANGE UP (ref 0–4)
REVIEW: SIGNIFICANT CHANGE UP
RH IG SCN BLD-IMP: POSITIVE — SIGNIFICANT CHANGE UP
S AUREUS DNA NOSE QL NAA+PROBE: DETECTED
SODIUM SERPL-SCNC: 135 MMOL/L — SIGNIFICANT CHANGE UP (ref 135–145)
SP GR SPEC: >1.03 — HIGH (ref 1–1.03)
SQUAMOUS # UR AUTO: 1 /HPF — SIGNIFICANT CHANGE UP (ref 0–5)
UROBILINOGEN FLD QL: 0.2 MG/DL — SIGNIFICANT CHANGE UP (ref 0.2–1)
WBC UR QL: 11 /HPF — HIGH (ref 0–5)

## 2024-04-03 PROCEDURE — 93010 ELECTROCARDIOGRAM REPORT: CPT

## 2024-04-03 PROCEDURE — 86077 PHYS BLOOD BANK SERV XMATCH: CPT

## 2024-04-03 PROCEDURE — 49465 FLUORO EXAM OF G/COLON TUBE: CPT | Mod: 26

## 2024-04-03 PROCEDURE — 99233 SBSQ HOSP IP/OBS HIGH 50: CPT | Mod: GC

## 2024-04-03 PROCEDURE — 74018 RADEX ABDOMEN 1 VIEW: CPT | Mod: 26,76

## 2024-04-03 PROCEDURE — 99223 1ST HOSP IP/OBS HIGH 75: CPT

## 2024-04-03 PROCEDURE — 99223 1ST HOSP IP/OBS HIGH 75: CPT | Mod: GC

## 2024-04-03 RX ORDER — MUPIROCIN 20 MG/G
1 OINTMENT TOPICAL ONCE
Refills: 0 | Status: COMPLETED | OUTPATIENT
Start: 2024-04-03 | End: 2024-04-04

## 2024-04-03 RX ORDER — CHLORHEXIDINE GLUCONATE 213 G/1000ML
1 SOLUTION TOPICAL DAILY
Refills: 0 | Status: DISCONTINUED | OUTPATIENT
Start: 2024-04-03 | End: 2024-04-04

## 2024-04-03 RX ORDER — ACETAMINOPHEN 500 MG
1000 TABLET ORAL ONCE
Refills: 0 | Status: COMPLETED | OUTPATIENT
Start: 2024-04-03 | End: 2024-04-03

## 2024-04-03 RX ORDER — SODIUM CHLORIDE 9 MG/ML
1000 INJECTION, SOLUTION INTRAVENOUS
Refills: 0 | Status: DISCONTINUED | OUTPATIENT
Start: 2024-04-03 | End: 2024-04-03

## 2024-04-03 RX ORDER — OXYCODONE HYDROCHLORIDE 5 MG/1
2.5 TABLET ORAL ONCE
Refills: 0 | Status: DISCONTINUED | OUTPATIENT
Start: 2024-04-03 | End: 2024-04-03

## 2024-04-03 RX ORDER — ONDANSETRON 8 MG/1
4 TABLET, FILM COATED ORAL ONCE
Refills: 0 | Status: COMPLETED | OUTPATIENT
Start: 2024-04-03 | End: 2024-04-03

## 2024-04-03 RX ORDER — SODIUM CHLORIDE 9 MG/ML
1000 INJECTION, SOLUTION INTRAVENOUS
Refills: 0 | Status: DISCONTINUED | OUTPATIENT
Start: 2024-04-03 | End: 2024-04-04

## 2024-04-03 RX ORDER — FIDAXOMICIN 200 MG/5ML
200 GRANULE, FOR SUSPENSION ORAL
Refills: 0 | Status: COMPLETED | OUTPATIENT
Start: 2024-04-03 | End: 2024-04-08

## 2024-04-03 RX ADMIN — Medication 2 DROP(S): at 18:09

## 2024-04-03 RX ADMIN — Medication 2 DROP(S): at 00:16

## 2024-04-03 RX ADMIN — INSULIN GLARGINE 7 UNIT(S): 100 INJECTION, SOLUTION SUBCUTANEOUS at 18:09

## 2024-04-03 RX ADMIN — Medication 2 DROP(S): at 18:54

## 2024-04-03 RX ADMIN — CHLORHEXIDINE GLUCONATE 1 APPLICATION(S): 213 SOLUTION TOPICAL at 11:24

## 2024-04-03 RX ADMIN — Medication 2 DROP(S): at 11:24

## 2024-04-03 RX ADMIN — Medication 2 DROP(S): at 05:48

## 2024-04-03 RX ADMIN — Medication 5 MILLILITER(S): at 05:51

## 2024-04-03 RX ADMIN — SIMETHICONE 80 MILLIGRAM(S): 80 TABLET, CHEWABLE ORAL at 17:34

## 2024-04-03 RX ADMIN — SODIUM CHLORIDE 50 MILLILITER(S): 9 INJECTION, SOLUTION INTRAVENOUS at 04:17

## 2024-04-03 RX ADMIN — Medication 1 APPLICATION(S): at 17:34

## 2024-04-03 RX ADMIN — FIDAXOMICIN 200 MILLIGRAM(S): 200 GRANULE, FOR SUSPENSION ORAL at 18:09

## 2024-04-03 RX ADMIN — LIDOCAINE 1 PATCH: 4 CREAM TOPICAL at 22:07

## 2024-04-03 RX ADMIN — Medication 1000 MILLIGRAM(S): at 00:30

## 2024-04-03 RX ADMIN — Medication 2 DROP(S): at 05:51

## 2024-04-03 RX ADMIN — Medication 5 MILLILITER(S): at 17:34

## 2024-04-03 RX ADMIN — Medication 400 MILLIGRAM(S): at 21:55

## 2024-04-03 RX ADMIN — Medication 1000 MILLIGRAM(S): at 09:23

## 2024-04-03 RX ADMIN — ONDANSETRON 4 MILLIGRAM(S): 8 TABLET, FILM COATED ORAL at 21:55

## 2024-04-03 RX ADMIN — ESCITALOPRAM OXALATE 10 MILLIGRAM(S): 10 TABLET, FILM COATED ORAL at 17:34

## 2024-04-03 RX ADMIN — Medication 5 MILLILITER(S): at 11:23

## 2024-04-03 RX ADMIN — MEROPENEM 100 MILLIGRAM(S): 1 INJECTION INTRAVENOUS at 05:49

## 2024-04-03 RX ADMIN — Medication 1 APPLICATION(S): at 05:48

## 2024-04-03 RX ADMIN — Medication 2 DROP(S): at 11:23

## 2024-04-03 RX ADMIN — PANTOPRAZOLE SODIUM 40 MILLIGRAM(S): 20 TABLET, DELAYED RELEASE ORAL at 05:47

## 2024-04-03 RX ADMIN — Medication 87.5 MICROGRAM(S): at 05:48

## 2024-04-03 RX ADMIN — CHLORHEXIDINE GLUCONATE 15 MILLILITER(S): 213 SOLUTION TOPICAL at 17:34

## 2024-04-03 RX ADMIN — LIDOCAINE 1 PATCH: 4 CREAM TOPICAL at 22:06

## 2024-04-03 RX ADMIN — Medication 400 MILLIGRAM(S): at 08:23

## 2024-04-03 RX ADMIN — CHLORHEXIDINE GLUCONATE 15 MILLILITER(S): 213 SOLUTION TOPICAL at 05:48

## 2024-04-03 RX ADMIN — QUETIAPINE FUMARATE 12.5 MILLIGRAM(S): 200 TABLET, FILM COATED ORAL at 18:55

## 2024-04-03 RX ADMIN — SODIUM CHLORIDE 75 MILLILITER(S): 9 INJECTION, SOLUTION INTRAVENOUS at 21:55

## 2024-04-03 RX ADMIN — Medication 1000 MILLIGRAM(S): at 22:30

## 2024-04-03 NOTE — PROGRESS NOTE ADULT - SUBJECTIVE AND OBJECTIVE BOX
Patient is a 74y old  Female who presents with a chief complaint of Dislodged G Tube (03 Apr 2024 12:38)      Interval Events:    REVIEW OF SYSTEMS:  [ ] Positive  [ ] All other systems negative  [ ] Unable to assess ROS because ________    Vital Signs Last 24 Hrs  T(C): 36.7 (04-03-24 @ 14:32), Max: 37.8 (04-02-24 @ 21:37)  T(F): 98 (04-03-24 @ 14:32), Max: 100 (04-02-24 @ 21:37)  HR: 80 (04-03-24 @ 14:32) (62 - 88)  BP: 123/50 (04-03-24 @ 14:32) (91/51 - 163/72)  RR: 16 (04-03-24 @ 14:32) (12 - 28)  SpO2: 100% (04-03-24 @ 14:05) (96% - 100%)PHYSICAL EXAM:  HEENT:   [ ]Tracheostomy:  [ ]Pupils equal  [ ]No oral lesions  [ ]Abnormal        SKIN  [ ]No Rash  [ ] Abnormal  [ ] pressure    CARDIAC  [ ]Regular  [ ]Abnormal    PULMONARY  [ ]Bilateral Clear Breath Sounds  [ ]Normal Excursion  [ ]Abnormal    GI  [ ]PEG      [ ] +BS		              [ ]Soft, nondistended, nontender	  [ ]Abnormal    MUSCULOSKELETAL                                   [ ]Bedbound                 [ ]Abnormal    [ ]Ambulatory/OOB to chair                           EXTREMITIES                                         [ ]Normal  [ ]Edema                           NEUROLOGIC  [ ] Normal, non focal  [ ] Focal findings:    PSYCHIATRIC  [ ]Alert and appropriate  [ ] Sedated	 [ ]Agitated    :  Mcbride: [ ] Yes, if yes: Date of Placement:                   [  ] No    LINES: Central Lines [ ] Yes, if yes: Date of Placement                                     [  ] No    HOSPITAL MEDICATIONS:  MEDICATIONS  (STANDING):  amLODIPine   Tablet 10 milliGRAM(s) Oral <User Schedule>  artificial tears (preservative free) Ophthalmic Solution 2 Drop(s) Both EYES every 6 hours  ascorbic acid 500 milliGRAM(s) Oral daily  Biotene Dry Mouth Oral Rinse 5 milliLiter(s) Swish and Spit every 6 hours  chlorhexidine 0.12% Liquid 15 milliLiter(s) Oral Mucosa every 12 hours  chlorhexidine 2% Cloths 1 Application(s) Topical daily  escitalopram 10 milliGRAM(s) Oral <User Schedule>  fentaNYL   Patch  25 MICROgram(s)/Hr 1 Patch Transdermal every 72 hours  gabapentin Solution 100 milliGRAM(s) Oral <User Schedule>  insulin glargine Injectable (LANTUS) 7 Unit(s) SubCutaneous <User Schedule>  insulin lispro (ADMELOG) corrective regimen sliding scale   SubCutaneous every 6 hours  lactated ringers. 1000 milliLiter(s) (50 mL/Hr) IV Continuous <Continuous>  levothyroxine Injectable 87.5 MICROGram(s) IV Push <User Schedule>  lidocaine   4% Patch 1 Patch Transdermal at bedtime  lidocaine   4% Patch 1 Patch Transdermal at bedtime  melatonin Liquid 3 milliGRAM(s) Oral at bedtime  multivitamin 1 Tablet(s) Oral daily  mupirocin 2% Nasal 1 Application(s) Both Nostrils once  pantoprazole  Injectable 40 milliGRAM(s) IV Push <User Schedule>  predniSONE  Solution 5 milliGRAM(s) Oral <User Schedule>  QUEtiapine 12.5 milliGRAM(s) Oral <User Schedule>  simethicone 80 milliGRAM(s) Chew every 12 hours  tobramycin 0.3% Ophthalmic Solution 2 Drop(s) Both EYES every 6 hours    MEDICATIONS  (PRN):  albuterol/ipratropium for Nebulization 3 milliLiter(s) Nebulizer every 6 hours PRN Shortness of Breath and/or Wheezing  petrolatum Ophthalmic Ointment 1 Application(s) Both EYES every 6 hours PRN eye dryness  sodium chloride 0.65% Nasal 1 Spray(s) Both Nostrils every 12 hours PRN Congestion      LABS:                        9.5    8.67  )-----------( 125      ( 02 Apr 2024 23:40 )             31.7     04-02    135  |  99  |  14  ----------------------------<  142<H>  3.6   |  25  |  <0.30<L>    Ca    9.5      02 Apr 2024 23:40  Phos  3.1     04-02  Mg     1.9     04-02    TPro  7.1  /  Alb  3.5  /  TBili  0.6  /  DBili  x   /  AST  17  /  ALT  20  /  AlkPhos  49  04-02    PT/INR - ( 02 Apr 2024 23:40 )   PT: 13.2 sec;   INR: 1.27 ratio         PTT - ( 02 Apr 2024 23:40 )  PTT:28.3 sec  Urinalysis Basic - ( 03 Apr 2024 00:22 )    Color: Yellow / Appearance: Clear / SG: >1.030 / pH: x  Gluc: x / Ketone: Trace mg/dL  / Bili: Negative / Urobili: 0.2 mg/dL   Blood: x / Protein: Trace mg/dL / Nitrite: Positive   Leuk Esterase: Small / RBC: 3 /HPF / WBC 11 /HPF   Sq Epi: x / Non Sq Epi: 1 /HPF / Bacteria: Many /HPF        Venous Blood Gas:  04-02 @ 23:28  7.42/44/19/28/25.2  VBG Lactate: 3.1    CAPILLARY BLOOD GLUCOSE    MICROBIOLOGY:     RADIOLOGY:  [ ] Reviewed and interpreted by me    Mode: AC/ CMV (Assist Control/ Continuous Mandatory Ventilation)  RR (machine): 12  TV (machine): 300  FiO2: 30  PEEP: 5  ITime: 1  MAP: 10  PIP: 26   Patient is a 74y old  Female who presents with a chief complaint of Dislodged G Tube (03 Apr 2024 12:38)      Interval Events: Transferred from MICU     REVIEW OF SYSTEMS:  [ ] Positive  [ ] All other systems negative  [x ] Unable to assess ROS because __Nonverbal with Tracheostomy ______    Vital Signs Last 24 Hrs  T(C): 36.7 (04-03-24 @ 14:32), Max: 37.8 (04-02-24 @ 21:37)  T(F): 98 (04-03-24 @ 14:32), Max: 100 (04-02-24 @ 21:37)  HR: 80 (04-03-24 @ 14:32) (62 - 88)  BP: 123/50 (04-03-24 @ 14:32) (91/51 - 163/72)  RR: 16 (04-03-24 @ 14:32) (12 - 28)  SpO2: 100% (04-03-24 @ 14:05) (96% - 100%)    PHYSICAL EXAM:    HEENT:   [ ]Tracheostomy:  [ ]Pupils equal  [ ]No oral lesions  [ ]Abnormal        SKIN  [ ]No Rash  [ ] Abnormal  [ ] pressure    CARDIAC  [ ]Regular  [ ]Abnormal    PULMONARY  [ ]Bilateral Clear Breath Sounds  [ ]Normal Excursion  [ ]Abnormal    GI  [ ]PEG      [ ] +BS		              [ ]Soft, nondistended, nontender	  [ ]Abnormal    MUSCULOSKELETAL                                   [ ]Bedbound                 [ ]Abnormal    [ ]Ambulatory/OOB to chair                           EXTREMITIES                                         [ ]Normal  [ ]Edema                           NEUROLOGIC  [ ] Normal, non focal  [ ] Focal findings:    PSYCHIATRIC  [ ]Alert and appropriate  [ ] Sedated	 [ ]Agitated    :  Mcbride: [ ] Yes, if yes: Date of Placement:                   [  ] No    LINES: Central Lines [ ] Yes, if yes: Date of Placement                                     [  ] No    HOSPITAL MEDICATIONS:  MEDICATIONS  (STANDING):  amLODIPine   Tablet 10 milliGRAM(s) Oral <User Schedule>  artificial tears (preservative free) Ophthalmic Solution 2 Drop(s) Both EYES every 6 hours  ascorbic acid 500 milliGRAM(s) Oral daily  Biotene Dry Mouth Oral Rinse 5 milliLiter(s) Swish and Spit every 6 hours  chlorhexidine 0.12% Liquid 15 milliLiter(s) Oral Mucosa every 12 hours  chlorhexidine 2% Cloths 1 Application(s) Topical daily  escitalopram 10 milliGRAM(s) Oral <User Schedule>  fentaNYL   Patch  25 MICROgram(s)/Hr 1 Patch Transdermal every 72 hours  gabapentin Solution 100 milliGRAM(s) Oral <User Schedule>  insulin glargine Injectable (LANTUS) 7 Unit(s) SubCutaneous <User Schedule>  insulin lispro (ADMELOG) corrective regimen sliding scale   SubCutaneous every 6 hours  lactated ringers. 1000 milliLiter(s) (50 mL/Hr) IV Continuous <Continuous>  levothyroxine Injectable 87.5 MICROGram(s) IV Push <User Schedule>  lidocaine   4% Patch 1 Patch Transdermal at bedtime  lidocaine   4% Patch 1 Patch Transdermal at bedtime  melatonin Liquid 3 milliGRAM(s) Oral at bedtime  multivitamin 1 Tablet(s) Oral daily  mupirocin 2% Nasal 1 Application(s) Both Nostrils once  pantoprazole  Injectable 40 milliGRAM(s) IV Push <User Schedule>  predniSONE  Solution 5 milliGRAM(s) Oral <User Schedule>  QUEtiapine 12.5 milliGRAM(s) Oral <User Schedule>  simethicone 80 milliGRAM(s) Chew every 12 hours  tobramycin 0.3% Ophthalmic Solution 2 Drop(s) Both EYES every 6 hours    MEDICATIONS  (PRN):  albuterol/ipratropium for Nebulization 3 milliLiter(s) Nebulizer every 6 hours PRN Shortness of Breath and/or Wheezing  petrolatum Ophthalmic Ointment 1 Application(s) Both EYES every 6 hours PRN eye dryness  sodium chloride 0.65% Nasal 1 Spray(s) Both Nostrils every 12 hours PRN Congestion      LABS:                        9.5    8.67  )-----------( 125      ( 02 Apr 2024 23:40 )             31.7     04-02    135  |  99  |  14  ----------------------------<  142<H>  3.6   |  25  |  <0.30<L>    Ca    9.5      02 Apr 2024 23:40  Phos  3.1     04-02  Mg     1.9     04-02    TPro  7.1  /  Alb  3.5  /  TBili  0.6  /  DBili  x   /  AST  17  /  ALT  20  /  AlkPhos  49  04-02    PT/INR - ( 02 Apr 2024 23:40 )   PT: 13.2 sec;   INR: 1.27 ratio         PTT - ( 02 Apr 2024 23:40 )  PTT:28.3 sec  Urinalysis Basic - ( 03 Apr 2024 00:22 )    Color: Yellow / Appearance: Clear / SG: >1.030 / pH: x  Gluc: x / Ketone: Trace mg/dL  / Bili: Negative / Urobili: 0.2 mg/dL   Blood: x / Protein: Trace mg/dL / Nitrite: Positive   Leuk Esterase: Small / RBC: 3 /HPF / WBC 11 /HPF   Sq Epi: x / Non Sq Epi: 1 /HPF / Bacteria: Many /HPF        Venous Blood Gas:  04-02 @ 23:28  7.42/44/19/28/25.2  VBG Lactate: 3.1    CAPILLARY BLOOD GLUCOSE    MICROBIOLOGY:     RADIOLOGY:  [ ] Reviewed and interpreted by me    Mode: AC/ CMV (Assist Control/ Continuous Mandatory Ventilation)  RR (machine): 12  TV (machine): 300  FiO2: 30  PEEP: 5  ITime: 1  MAP: 10  PIP: 26   Patient is a 74y old  Female who presents with a chief complaint of Dislodged G Tube (03 Apr 2024 12:38)      Interval Events: Transferred from MICU     REVIEW OF SYSTEMS:  [ ] Positive  [ ] All other systems negative  [x ] Unable to assess ROS because __Nonverbal with Tracheostomy ______    Vital Signs Last 24 Hrs  T(C): 36.7 (04-03-24 @ 14:32), Max: 37.8 (04-02-24 @ 21:37)  T(F): 98 (04-03-24 @ 14:32), Max: 100 (04-02-24 @ 21:37)  HR: 80 (04-03-24 @ 14:32) (62 - 88)  BP: 123/50 (04-03-24 @ 14:32) (91/51 - 163/72)  RR: 16 (04-03-24 @ 14:32) (12 - 28)  SpO2: 100% (04-03-24 @ 14:05) (96% - 100%)    PHYSICAL EXAM:    HEENT:   [x ]Tracheostomy: #8 Distal XLT [place]   [ ]Pupils equal  [x ]No oral lesions  [ ]Abnormal    SKIN  [ ]No Rash  [x ] Abnormal: + sacral/buttock injury   [ ] pressure    CARDIAC  [x ]Regular  [ ]Abnormal    PULMONARY  [x]Bilateral Clear Breath Sounds  [ ]Normal Excursion  [ ]Abnormal    GI  [x ]PEG replacement today, dry dressing in place       [x ] +BS		              [x]Soft, nondistended, nontender	  [ ]Abnormal    MUSCULOSKELETAL                                   [x ]Bedbound                 [ ]Abnormal    [ ]Ambulatory/OOB to chair                           EXTREMITIES                                         [x ]Normal  [ ]Edema                           NEUROLOGIC  [ ] Normal, non focal  [x ] Focal findings: Opens eyes intermittently, not following commands, withdraws in bilateral extremities to noxious stimulus     PSYCHIATRIC  [x ]UTO 2/2 Mental state   [ ] Sedated	 [ ]Agitated    :  Mcbride: [ ] Yes, if yes: Date of Placement:                   [x  ] No    LINES: Central Lines [ ] Yes, if yes: Date of Placement                                     [x  ] No    HOSPITAL MEDICATIONS:  MEDICATIONS  (STANDING):  amLODIPine   Tablet 10 milliGRAM(s) Oral <User Schedule>  artificial tears (preservative free) Ophthalmic Solution 2 Drop(s) Both EYES every 6 hours  ascorbic acid 500 milliGRAM(s) Oral daily  Biotene Dry Mouth Oral Rinse 5 milliLiter(s) Swish and Spit every 6 hours  chlorhexidine 0.12% Liquid 15 milliLiter(s) Oral Mucosa every 12 hours  chlorhexidine 2% Cloths 1 Application(s) Topical daily  escitalopram 10 milliGRAM(s) Oral <User Schedule>  fentaNYL   Patch  25 MICROgram(s)/Hr 1 Patch Transdermal every 72 hours  gabapentin Solution 100 milliGRAM(s) Oral <User Schedule>  insulin glargine Injectable (LANTUS) 7 Unit(s) SubCutaneous <User Schedule>  insulin lispro (ADMELOG) corrective regimen sliding scale   SubCutaneous every 6 hours  lactated ringers. 1000 milliLiter(s) (50 mL/Hr) IV Continuous <Continuous>  levothyroxine Injectable 87.5 MICROGram(s) IV Push <User Schedule>  lidocaine   4% Patch 1 Patch Transdermal at bedtime  lidocaine   4% Patch 1 Patch Transdermal at bedtime  melatonin Liquid 3 milliGRAM(s) Oral at bedtime  multivitamin 1 Tablet(s) Oral daily  mupirocin 2% Nasal 1 Application(s) Both Nostrils once  pantoprazole  Injectable 40 milliGRAM(s) IV Push <User Schedule>  predniSONE  Solution 5 milliGRAM(s) Oral <User Schedule>  QUEtiapine 12.5 milliGRAM(s) Oral <User Schedule>  simethicone 80 milliGRAM(s) Chew every 12 hours  tobramycin 0.3% Ophthalmic Solution 2 Drop(s) Both EYES every 6 hours    MEDICATIONS  (PRN):  albuterol/ipratropium for Nebulization 3 milliLiter(s) Nebulizer every 6 hours PRN Shortness of Breath and/or Wheezing  petrolatum Ophthalmic Ointment 1 Application(s) Both EYES every 6 hours PRN eye dryness  sodium chloride 0.65% Nasal 1 Spray(s) Both Nostrils every 12 hours PRN Congestion      LABS:                        9.5    8.67  )-----------( 125      ( 02 Apr 2024 23:40 )             31.7     04-02    135  |  99  |  14  ----------------------------<  142<H>  3.6   |  25  |  <0.30<L>    Ca    9.5      02 Apr 2024 23:40  Phos  3.1     04-02  Mg     1.9     04-02    TPro  7.1  /  Alb  3.5  /  TBili  0.6  /  DBili  x   /  AST  17  /  ALT  20  /  AlkPhos  49  04-02    PT/INR - ( 02 Apr 2024 23:40 )   PT: 13.2 sec;   INR: 1.27 ratio         PTT - ( 02 Apr 2024 23:40 )  PTT:28.3 sec  Urinalysis Basic - ( 03 Apr 2024 00:22 )    Color: Yellow / Appearance: Clear / SG: >1.030 / pH: x  Gluc: x / Ketone: Trace mg/dL  / Bili: Negative / Urobili: 0.2 mg/dL   Blood: x / Protein: Trace mg/dL / Nitrite: Positive   Leuk Esterase: Small / RBC: 3 /HPF / WBC 11 /HPF   Sq Epi: x / Non Sq Epi: 1 /HPF / Bacteria: Many /HPF        Venous Blood Gas:  04-02 @ 23:28  7.42/44/19/28/25.2  VBG Lactate: 3.1    CAPILLARY BLOOD GLUCOSE    MICROBIOLOGY:     RADIOLOGY:  [ ] Reviewed and interpreted by me    Mode: AC/ CMV (Assist Control/ Continuous Mandatory Ventilation)  RR (machine): 12  TV (machine): 300  FiO2: 30  PEEP: 5  ITime: 1  MAP: 10  PIP: 26

## 2024-04-03 NOTE — PROGRESS NOTE ADULT - ASSESSMENT
73 yo F PMH T2DM on insulin, HTN, HLD, hypothyroidism, RA on Prednisone, Fibromyalgia, cerebral aneurysm s/p repair, chronic hypercapnic respiratory failure s/p trach (vent dependent) and Chronic PEG 2022 , recurrent C.diff infection (follows with Dr. Newman) , bedbound who presented from home with complaints of possible G tube dislodgement and redness around the site. Had CT Abdomen/Pelvis done showing dislodgement of G tube with balloon in the anterior abdominal wall with air filled track to stomach. Admitted to MICU for ventilator management and given vancomycin empirically for treatment of cellulitis. Per family patient has had  Known infection for past 2-3 weeks.  Treated by Dr. Zion Newman, currently receiving Fidaxomicin as outpatient.  Also report recent UTI--not currently being treated. Consulted by ID, GI, Surgery and IR in MICU. Transferred to RCU 4/3.    4/3:      75 yo F PMH T2DM on insulin, HTN, HLD, hypothyroidism, RA on Prednisone, Fibromyalgia, cerebral aneurysm s/p repair, chronic hypercapnic respiratory failure s/p trach (vent dependent) and Chronic PEG 2022 , recurrent C.diff infection (follows with Dr. Newman) , bedbound who presented from home with complaints of possible G tube dislodgement and redness around the site. Had CT Abdomen/Pelvis done showing dislodgement of G tube with balloon in the anterior abdominal wall with air filled track to stomach. Admitted to MICU for ventilator management and given vancomycin/ meropenum empirically for treatment of cellulitis. Per family patient has had Known infection for past 2-3 weeks.  Treated by Dr. Zion Newman, currently receiving Fidaxomicin as outpatient.  Also report recent UTI--not currently being treated. Consulted by ID, GI, Surgery and IR in MICU. Transferred to RCU 4/3.    4/3: Transferred from MICU, HD Stable, and stable on Full Vent Support settings. Received bedside peg exchange by IR today. G tube XR study confirmed peg in stomach. IR cleared for use of tube feeds/meds. ID restarted patient on home Fidaxomicin oral bid x 5 days. Wound care and Nutrition to f/u in am. Will likely proceed with discharge planning back to home w/ home services to be reinstated      75 yo F PMH T2DM on insulin, HTN, HLD, hypothyroidism, RA on Prednisone, Fibromyalgia, cerebral aneurysm s/p repair, chronic hypercapnic respiratory failure s/p trach (vent dependent) and Chronic PEG 2022 , recurrent C.diff infection (follows with Dr. Newman) , bedbound who presented from home with complaints of possible G tube dislodgement and redness around the site. Had CT Abdomen/Pelvis done showing dislodgement of G tube with balloon in the anterior abdominal wall with air filled track to stomach. Admitted to MICU for ventilator management and given vancomycin/ meropenum empirically for treatment of cellulitis. Per family patient has had Known c diff infection for past 2-3 weeks. Was being treated by Dr. Zion Newman, and currently receiving home Fidaxomicin.  Also report recent UTI--not currently being treated. Patient was consulted by ID, GI, Surgery and IR in MICU. Transferred to RCU 4/3.    4/3: Transferred from MICU, HD Stable, and stable on Full Vent Support settings. Received bedside peg exchange by IR today. G tube XR study confirmed peg in stomach. IR cleared for use of tube feeds/meds. ID restarted patient on home Fidaxomicin oral bid x 5 days. Wound care and Nutrition to f/u in am. Will likely proceed with discharge planning back to home w/ home services to be reinstated.

## 2024-04-03 NOTE — PROGRESS NOTE ADULT - PROBLEM SELECTOR PLAN 3
Chronic Hypoxemic/Hypercapnic Respiratory Failure  Chronic Trach/ Vent dependant 2/2 Hypercapnic Resp Failure since 10/2022   - S/p Trach # 8 Distal XLT Shiley Cuffed   - Home Vent: volume /12/30%/+5  - Wean as able   - Trach Care and suction PRN

## 2024-04-03 NOTE — PROGRESS NOTE ADULT - ASSESSMENT
75 yo F with complex PMH significant for RA, hypercarbic respiratory s/p Trach and PEG, ventilator dependent, T2DM, HTN, C-difficile presented with dislodged PEG with site concerning for cellulitis.         Neuro:  -Alert, able to mouth words, bedbound at baseline  -Continue routine neuro checks  -Currently holding Seroquel, Gabapentin as patient NPO  -Continue Fentanyl patch  -Tylenol IV, Morphine prn pain as needed  -Continue Lidocaine patch for pain        HEENT:   -Continue Tobramycin eye drops for home  -Continue Artificial Tears  -[place] in right ear per patient's son        Pulm:  *Chronic Hypoxemic/Hypercapnic Respiratory Failure  -Trach to vent--8XLT Shiley at bedside  -Continue mechanical ventilation--12/300/30/5  -Continuous pulse oximetry  -Duoneb prn  -Trach care, suction prn        CV:  *HTN  -Hold Amlodipine as NPO  -VS q1h  -Treat SBP > 170 with Labetalol as needed  -Telemetry monitoring--sinus rhythm    *Hx of HLD  -Send Lipid Panel      GI:  -PEG tube dislodged--currently NPO  -GI and Surgery consulted  -Gastrograffin study as per Surgery  -Consider IR consult  in AM for replacement based on PEG study  -Hold Bowel regimen and tube feeds at this time  -Stool count    Renal:  -No acute issues  -Trend Cr daily  -Optimize electrolytes  -Intake and output per routine      ID:   * Concern for cellulitis at PEG site  --Will send blood cultures x 2  --Meropenem for now (s/p Vanco x 1 in ED)  --Will send MRSA swab  --Procalcitonin  --Serial abdominal exams  --Consider ID consult in AM    *C diff (being treated outpt by Dr Newman)  --Contact Precautions  --Hold Fidaxomicin for now as NPO (per family can only be treated with Fidaxomicin and Vanco PO)  --Allergy to Flagyl   --Stool Count  -- ID consult in AM    *UTI--previous VRE  --Will send UA as reportedly with UTI recently outpatient    *History of CRE Pseudomonas Pna  --no s/s of active pna at present        Endo:  T2DM  --Lantus 15 units daily---ordered for 7units as patient NPO  --Hold Novolin as NPO--normally takes 12 units at noon, 6 units at 6Am and 6PM  --ISS q6H  --Send A1C    Hypothyroid:  --Synthroid 125mcg PO daily, convert to IV for now    Consider Endo consult for further management      Immune: Hx of RA  -Continue Prednsione 5mg qd  --If unable to use PEG tomorrow, will convert to IV     Skin:     *Small Sacral Ulcer present on admit  --Consider Wound Consult  --Turn and position q2h and prn    *IAD  --Pericare as needed      PPX:  PAS (allergy to heparin), Protonix (home med)      GOC: DNR. Next of kin/primary decision maker is daughter Marysol [phone]   75 yo F with complex PMH significant for RA, hypercarbic respiratory s/p Trach and PEG, ventilator dependent, T2DM, HTN, C-difficile presented with dislodged PEG with site concerning for cellulitis.         Neuro:  -Alert, able to mouth words, bedbound at baseline  -Continue routine neuro checks  -Currently holding Seroquel, Gabapentin as patient NPO  -Continue Fentanyl patch  -Tylenol IV, Morphine prn pain as needed  -Continue Lidocaine patch for pain        HEENT:   -Continue Tobramycin eye drops for home  -Continue Artificial Tears  -[place] in right ear per patient's son        Pulm:  *Chronic Hypoxemic/Hypercapnic Respiratory Failure  -Trach to vent--8XLT Shiley at bedside  -Continue mechanical ventilation--12/300/30/5  -Continuous pulse oximetry  -Duoneb prn  -Trach care, suction prn        CV:  *HTN  -Hold Amlodipine as NPO  -VS q1h  -Treat SBP > 170 with Labetalol as needed  -Telemetry monitoring--sinus rhythm    *Hx of HLD  -Send Lipid Panel      GI:  -PEG tube dislodged--currently NPO  -GI and Surgery consulted  -Gastrograffin study as per Surgery  -Consider IR consult  in AM for replacement based on PEG study  -Hold Bowel regimen and tube feeds at this time  -Stool count    Renal:  -No acute issues  -Trend Cr daily  -Optimize electrolytes  -Intake and output per routine      ID:   * Concern for cellulitis at PEG site  --Will send blood cultures x 2  --ID consulted, appreciate recs  --DC'd meropenem 4/3    *C diff (being treated outpt by Dr Newman)  --Contact Precautions  --Hold Fidaxomicin for now as NPO (per family can only be treated with Fidaxomicin and Vanco PO)  --Allergy to Flagyl   --Stool Count  -restart PO vancomycin after PEG tube replacement    *UTI--previous VRE  --Will send UA as reportedly with UTI recently outpatient  -monitor off antiboiotics    *History of CRE Pseudomonas Pna  --no s/s of active pna at present        Endo:  T2DM  --Lantus 15 units daily---ordered for 7units as patient NPO  --Hold Novolin as NPO--normally takes 12 units at noon, 6 units at 6Am and 6PM  --ISS q6H      Hypothyroid:  --Synthroid 125mcg PO daily, convert to IV for now    Consider Endo consult for further management      Immune: Hx of RA  -Continue Prednsione 5mg qd  --If unable to use PEG tomorrow, will convert to IV     Skin:     *Small Sacral Ulcer present on admit  --Consider Wound Consult  --Turn and position q2h and prn    *IAD  --Pericare as needed      PPX:  PAS (allergy to heparin), Protonix (home med)      GOC: DNR. Next of kin/primary decision maker is daughter Marysol [phone]

## 2024-04-03 NOTE — PROGRESS NOTE ADULT - PROBLEM SELECTOR PLAN 1
PEG tube dislodgement on admission   - S/p CT abd/pelvis showing dislodgement of G tube with balloon in the anterior abdominal wall with air filled track to stomach.  - initially GI and Surgery consulted   - S/p bedside exchange by IR on 4/3- # 20 Fr [place] EnFit placed   - GTube XR study confirmed peg in stomach   - Cleared by IR for use of meds and Tube Feeds  - Resume TF and monitor for tolerance  - Nutrition eval pending

## 2024-04-03 NOTE — PROGRESS NOTE ADULT - SUBJECTIVE AND OBJECTIVE BOX
Surgery Daily Progress Note    Subjective:   Patient seen at bedside this AM.       Objective:  Vital Signs  T(C): 37.2 (04-03 @ 08:00), Max: 37.8 (04-02 @ 21:37)  HR: 62 (04-03 @ 10:00) (62 - 95)  BP: 111/55 (04-03 @ 10:00) (91/51 - 163/72)  RR: 12 (04-03 @ 10:00) (12 - 28)  SpO2: 100% (04-03 @ 10:00) (96% - 100%)  04-02-24 @ 07:01  -  04-03-24 @ 07:00  --------------------------------------------------------  IN:  Total IN: 0 mL    OUT:    Incontinent per Collection Bag (mL): 250 mL  Total OUT: 250 mL    Total NET: -250 mL          Physical Exam:  General: No acute distress, resting comfortably in bed  HEENT: Mechanical ventilation via tracheostomy  Abdomen: soft, nondistended, nontender, PEG tube site with mild surrounding erythema, no purulent drainage, surrounding skin TTP, Skin firm to palpation below directly at the tube site. PEG tube w gastric contents within tubing.     Labs:                        9.5    8.67  )-----------( 125      ( 02 Apr 2024 23:40 )             31.7   04-02    135  |  99  |  14  ----------------------------<  142<H>  3.6   |  25  |  <0.30<L>    Ca    9.5      02 Apr 2024 23:40  Phos  3.1     04-02  Mg     1.9     04-02    TPro  7.1  /  Alb  3.5  /  TBili  0.6  /  DBili  x   /  AST  17  /  ALT  20  /  AlkPhos  49  04-02    CAPILLARY BLOOD GLUCOSE      POCT Blood Glucose.: 131 mg/dL (03 Apr 2024 11:22)  POCT Blood Glucose.: 131 mg/dL (03 Apr 2024 06:21)  POCT Blood Glucose.: 164 mg/dL (02 Apr 2024 12:51)      Medications:   MEDICATIONS  (STANDING):  amLODIPine   Tablet 10 milliGRAM(s) Oral <User Schedule>  artificial tears (preservative free) Ophthalmic Solution 2 Drop(s) Both EYES every 6 hours  ascorbic acid 500 milliGRAM(s) Oral daily  Biotene Dry Mouth Oral Rinse 5 milliLiter(s) Swish and Spit every 6 hours  chlorhexidine 0.12% Liquid 15 milliLiter(s) Oral Mucosa every 12 hours  chlorhexidine 2% Cloths 1 Application(s) Topical daily  escitalopram 10 milliGRAM(s) Oral <User Schedule>  fentaNYL   Patch  25 MICROgram(s)/Hr 1 Patch Transdermal every 72 hours  gabapentin Solution 100 milliGRAM(s) Oral <User Schedule>  insulin glargine Injectable (LANTUS) 7 Unit(s) SubCutaneous <User Schedule>  insulin lispro (ADMELOG) corrective regimen sliding scale   SubCutaneous every 6 hours  lactated ringers. 1000 milliLiter(s) (50 mL/Hr) IV Continuous <Continuous>  levothyroxine Injectable 87.5 MICROGram(s) IV Push <User Schedule>  lidocaine   4% Patch 1 Patch Transdermal at bedtime  lidocaine   4% Patch 1 Patch Transdermal at bedtime  melatonin Liquid 3 milliGRAM(s) Oral at bedtime  multivitamin 1 Tablet(s) Oral daily  mupirocin 2% Nasal 1 Application(s) Both Nostrils once  pantoprazole  Injectable 40 milliGRAM(s) IV Push <User Schedule>  predniSONE  Solution 5 milliGRAM(s) Oral <User Schedule>  QUEtiapine 12.5 milliGRAM(s) Oral <User Schedule>  simethicone 80 milliGRAM(s) Chew every 12 hours  tobramycin 0.3% Ophthalmic Solution 2 Drop(s) Both EYES every 6 hours    MEDICATIONS  (PRN):  albuterol/ipratropium for Nebulization 3 milliLiter(s) Nebulizer every 6 hours PRN Shortness of Breath and/or Wheezing  petrolatum Ophthalmic Ointment 1 Application(s) Both EYES every 6 hours PRN eye dryness  sodium chloride 0.65% Nasal 1 Spray(s) Both Nostrils every 12 hours PRN Congestion      Imaging:  < from: CT Abdomen and Pelvis w/ IV Cont (04.02.24 @ 15:22) >  PROCEDURE:  CT of the Abdomen and Pelvis was performed.  Sagittal and coronal reformats were performed.    FINDINGS:  LOWER CHEST: Within normal limits.    LIVER: Within normal limits.  BILE DUCTS: Normal caliber.  GALLBLADDER: Within normal limits.  SPLEEN: Within normal limits.  PANCREAS: Within normal limits.  ADRENALS: Within normal limits.  KIDNEYS/URETERS: Within normal limits.    BLADDER: Within normal limits.  REPRODUCTIVE ORGANS: Stable 2.5 cm right adnexal cyst.    BOWEL: No bowel obstruction. Appendix is not visualized. Dislodged PEG   tube with balloon in the anterior abdominal wall with air-filled tract to   stomach.  PERITONEUM: No ascites.  VESSELS: Atherosclerotic changes.  RETROPERITONEUM/LYMPH NODES: No lymphadenopathy.  ABDOMINAL WALL: Stable skin thickening along the gluteal cleft.  BONES: Degenerative changes.    IMPRESSION:  Dislodged PEG tube with balloon in the anterior abdominal wall with   air-filled tract to stomach.    < end of copied text >

## 2024-04-03 NOTE — PROGRESS NOTE ADULT - SUBJECTIVE AND OBJECTIVE BOX
INCOMPLETE     INTERVAL HPI/OVERNIGHT EVENTS:    SUBJECTIVE: Patient seen and examined at bedside. ROS could not be obtained as patient intubated, sedated.      VITAL SIGNS:  ICU Vital Signs Last 24 Hrs  T(C): 37.7 (03 Apr 2024 04:00), Max: 37.8 (02 Apr 2024 21:37)  T(F): 99.8 (03 Apr 2024 04:00), Max: 100 (02 Apr 2024 21:37)  HR: 68 (03 Apr 2024 06:00) (65 - 95)  BP: 92/48 (03 Apr 2024 06:00) (91/51 - 163/72)  BP(mean): 67 (03 Apr 2024 06:00) (67 - 111)  ABP: --  ABP(mean): --  RR: 13 (03 Apr 2024 06:00) (12 - 28)  SpO2: 97% (03 Apr 2024 06:00) (96% - 100%)    O2 Parameters below as of 02 Apr 2024 21:37      O2 Concentration (%): 30      Mode: AC/ CMV (Assist Control/ Continuous Mandatory Ventilation), RR (machine): 12, TV (machine): 300, FiO2: 30, PEEP: 5, ITime: 0.78, MAP: 9, PIP: 24  Plateau pressure:   P/F ratio:     04-02 @ 07:01  -  04-03 @ 07:00  --------------------------------------------------------  IN: 650 mL / OUT: 250 mL / NET: 400 mL      CAPILLARY BLOOD GLUCOSE      POCT Blood Glucose.: 131 mg/dL (03 Apr 2024 06:21)    I&O's Summary    02 Apr 2024 07:01  -  03 Apr 2024 07:00  --------------------------------------------------------  IN: 650 mL / OUT: 250 mL / NET: 400 mL          PHYSICAL EXAM:    General: NAD intubated  HEENT: PERJW, EOMI, non-icteric  Neck:  symmetric,  JVD absent  Respiratory: Clear to ascultation bilaterally, no crackles/rales, no Resp distress; no accessory muscle use  Cardiovascular:  RRR, no murmurs/rubs/gallops  Abdomen: Soft, NT, ND  Extremities: No edema noted  Skin: No rashes or lesions noted  Neurological: RASS _  Psychiatry: AOx    MEDICATIONS:  MEDICATIONS  (STANDING):  amLODIPine   Tablet 10 milliGRAM(s) Oral <User Schedule>  artificial tears (preservative free) Ophthalmic Solution 2 Drop(s) Both EYES every 6 hours  ascorbic acid 500 milliGRAM(s) Oral daily  Biotene Dry Mouth Oral Rinse 5 milliLiter(s) Swish and Spit every 6 hours  chlorhexidine 0.12% Liquid 15 milliLiter(s) Oral Mucosa every 12 hours  escitalopram 10 milliGRAM(s) Oral <User Schedule>  fentaNYL   Patch  25 MICROgram(s)/Hr 1 Patch Transdermal every 72 hours  gabapentin Solution 100 milliGRAM(s) Oral <User Schedule>  insulin glargine Injectable (LANTUS) 7 Unit(s) SubCutaneous <User Schedule>  insulin lispro (ADMELOG) corrective regimen sliding scale   SubCutaneous every 6 hours  lactated ringers. 1000 milliLiter(s) (50 mL/Hr) IV Continuous <Continuous>  lactobacillus acidophilus 1 Tablet(s) Oral daily  levothyroxine Injectable 87.5 MICROGram(s) IV Push <User Schedule>  lidocaine   4% Patch 1 Patch Transdermal at bedtime  lidocaine   4% Patch 1 Patch Transdermal at bedtime  melatonin Liquid 3 milliGRAM(s) Oral at bedtime  meropenem  IVPB 1000 milliGRAM(s) IV Intermittent every 8 hours  multivitamin 1 Tablet(s) Oral daily  pantoprazole  Injectable 40 milliGRAM(s) IV Push <User Schedule>  predniSONE  Solution 5 milliGRAM(s) Oral <User Schedule>  QUEtiapine 12.5 milliGRAM(s) Oral <User Schedule>  simethicone 80 milliGRAM(s) Chew every 12 hours  tobramycin 0.3% Ophthalmic Solution 2 Drop(s) Both EYES every 6 hours    MEDICATIONS  (PRN):  albuterol/ipratropium for Nebulization 3 milliLiter(s) Nebulizer every 6 hours PRN Shortness of Breath and/or Wheezing  petrolatum Ophthalmic Ointment 1 Application(s) Both EYES every 6 hours PRN eye dryness  sodium chloride 0.65% Nasal 1 Spray(s) Both Nostrils every 12 hours PRN Congestion      ALLERGIES:  Allergies    pineapple (Unknown)  Tagamet (Unknown)  heparin (Unknown)  walnut (Unknown)  metronidazole (Rash)  penicillin (Unknown)  Lyrica (Unknown)  Pecan, [place]bert, Hazelnut (Unknown)    Intolerances        LABS:                        9.5    8.67  )-----------( 125      ( 02 Apr 2024 23:40 )             31.7     04-02    135  |  99  |  14  ----------------------------<  142<H>  3.6   |  25  |  <0.30<L>    Ca    9.5      02 Apr 2024 23:40  Phos  3.1     04-02  Mg     1.9     04-02    TPro  7.1  /  Alb  3.5  /  TBili  0.6  /  DBili  x   /  AST  17  /  ALT  20  /  AlkPhos  49  04-02    PT/INR - ( 02 Apr 2024 23:40 )   PT: 13.2 sec;   INR: 1.27 ratio         PTT - ( 02 Apr 2024 23:40 )  PTT:28.3 sec    Urinalysis Basic - ( 03 Apr 2024 00:22 )    Color: Yellow / Appearance: Clear / SG: >1.030 / pH: x  Gluc: x / Ketone: Trace mg/dL  / Bili: Negative / Urobili: 0.2 mg/dL   Blood: x / Protein: Trace mg/dL / Nitrite: Positive   Leuk Esterase: Small / RBC: 3 /HPF / WBC 11 /HPF   Sq Epi: x / Non Sq Epi: 1 /HPF / Bacteria: Many /HPF          RADIOLOGY & ADDITIONAL TESTS: Reviewed. INTERVAL HPI/OVERNIGHT EVENTS: Admitted to MICU for PEG tube exchange    SUBJECTIVE: Patient seen and examined at bedside, communicating through nods and gesture iso Trach. Has some abdominal pain near PEG site, no other concerns.      VITAL SIGNS:  ICU Vital Signs Last 24 Hrs  T(C): 37.7 (03 Apr 2024 04:00), Max: 37.8 (02 Apr 2024 21:37)  T(F): 99.8 (03 Apr 2024 04:00), Max: 100 (02 Apr 2024 21:37)  HR: 68 (03 Apr 2024 06:00) (65 - 95)  BP: 92/48 (03 Apr 2024 06:00) (91/51 - 163/72)  BP(mean): 67 (03 Apr 2024 06:00) (67 - 111)  ABP: --  ABP(mean): --  RR: 13 (03 Apr 2024 06:00) (12 - 28)  SpO2: 97% (03 Apr 2024 06:00) (96% - 100%)    O2 Parameters below as of 02 Apr 2024 21:37      O2 Concentration (%): 30      Mode: AC/ CMV (Assist Control/ Continuous Mandatory Ventilation), RR (machine): 12, TV (machine): 300, FiO2: 30, PEEP: 5, ITime: 0.78, MAP: 9, PIP: 24  Plateau pressure:   P/F ratio:     04-02 @ 07:01  -  04-03 @ 07:00  --------------------------------------------------------  IN: 650 mL / OUT: 250 mL / NET: 400 mL      CAPILLARY BLOOD GLUCOSE      POCT Blood Glucose.: 131 mg/dL (03 Apr 2024 06:21)    I&O's Summary    02 Apr 2024 07:01  -  03 Apr 2024 07:00  --------------------------------------------------------  IN: 650 mL / OUT: 250 mL / NET: 400 mL          PHYSICAL EXAM:    General: NAD with tracheostomy  HEENT: PERRLA, EOMI, non-icteric  Neck:  symmetric,  JVD absent  Respiratory: Clear to ascultation bilaterally, no crackles/rales, no Resp distress; no accessory muscle use  Cardiovascular:  RRR, no murmurs/rubs/gallops  Abdomen: Soft, tender to palpation around PEG sit  Extremities: No edema noted  Skin: No rashes or lesions noted  Neurological: Intact  Psychiatry: AOx3    MEDICATIONS:  MEDICATIONS  (STANDING):  amLODIPine   Tablet 10 milliGRAM(s) Oral <User Schedule>  artificial tears (preservative free) Ophthalmic Solution 2 Drop(s) Both EYES every 6 hours  ascorbic acid 500 milliGRAM(s) Oral daily  Biotene Dry Mouth Oral Rinse 5 milliLiter(s) Swish and Spit every 6 hours  chlorhexidine 0.12% Liquid 15 milliLiter(s) Oral Mucosa every 12 hours  escitalopram 10 milliGRAM(s) Oral <User Schedule>  fentaNYL   Patch  25 MICROgram(s)/Hr 1 Patch Transdermal every 72 hours  gabapentin Solution 100 milliGRAM(s) Oral <User Schedule>  insulin glargine Injectable (LANTUS) 7 Unit(s) SubCutaneous <User Schedule>  insulin lispro (ADMELOG) corrective regimen sliding scale   SubCutaneous every 6 hours  lactated ringers. 1000 milliLiter(s) (50 mL/Hr) IV Continuous <Continuous>  lactobacillus acidophilus 1 Tablet(s) Oral daily  levothyroxine Injectable 87.5 MICROGram(s) IV Push <User Schedule>  lidocaine   4% Patch 1 Patch Transdermal at bedtime  lidocaine   4% Patch 1 Patch Transdermal at bedtime  melatonin Liquid 3 milliGRAM(s) Oral at bedtime  meropenem  IVPB 1000 milliGRAM(s) IV Intermittent every 8 hours  multivitamin 1 Tablet(s) Oral daily  pantoprazole  Injectable 40 milliGRAM(s) IV Push <User Schedule>  predniSONE  Solution 5 milliGRAM(s) Oral <User Schedule>  QUEtiapine 12.5 milliGRAM(s) Oral <User Schedule>  simethicone 80 milliGRAM(s) Chew every 12 hours  tobramycin 0.3% Ophthalmic Solution 2 Drop(s) Both EYES every 6 hours    MEDICATIONS  (PRN):  albuterol/ipratropium for Nebulization 3 milliLiter(s) Nebulizer every 6 hours PRN Shortness of Breath and/or Wheezing  petrolatum Ophthalmic Ointment 1 Application(s) Both EYES every 6 hours PRN eye dryness  sodium chloride 0.65% Nasal 1 Spray(s) Both Nostrils every 12 hours PRN Congestion      ALLERGIES:  Allergies    pineapple (Unknown)  Tagamet (Unknown)  heparin (Unknown)  walnut (Unknown)  metronidazole (Rash)  penicillin (Unknown)  Lyrica (Unknown)  Pecan, Filbert, Hazelnut (Unknown)    Intolerances        LABS:                        9.5    8.67  )-----------( 125      ( 02 Apr 2024 23:40 )             31.7     04-02    135  |  99  |  14  ----------------------------<  142<H>  3.6   |  25  |  <0.30<L>    Ca    9.5      02 Apr 2024 23:40  Phos  3.1     04-02  Mg     1.9     04-02    TPro  7.1  /  Alb  3.5  /  TBili  0.6  /  DBili  x   /  AST  17  /  ALT  20  /  AlkPhos  49  04-02    PT/INR - ( 02 Apr 2024 23:40 )   PT: 13.2 sec;   INR: 1.27 ratio         PTT - ( 02 Apr 2024 23:40 )  PTT:28.3 sec    Urinalysis Basic - ( 03 Apr 2024 00:22 )    Color: Yellow / Appearance: Clear / SG: >1.030 / pH: x  Gluc: x / Ketone: Trace mg/dL  / Bili: Negative / Urobili: 0.2 mg/dL   Blood: x / Protein: Trace mg/dL / Nitrite: Positive   Leuk Esterase: Small / RBC: 3 /HPF / WBC 11 /HPF   Sq Epi: x / Non Sq Epi: 1 /HPF / Bacteria: Many /HPF          RADIOLOGY & ADDITIONAL TESTS: Reviewed.

## 2024-04-03 NOTE — CHART NOTE - NSCHARTNOTEFT_GEN_A_CORE
MEDICINE PA EPISODIC NOTE    Notified by RN of pt. having leaking from PEG tube. Pt. seen and examined at bedside, w/ daughter present, alert and in pain when palpating area around newly placed PEG tube. Pt. noted to have yellow collored gastric content that saturated dressing around PEG site. PEG feeds placed on hold and pt. placed on D5+LR @ 75 cc/hr. MEDICINE PA EPISODIC NOTE    Notified by RN of pt. having leaking from PEG tube. Pt. seen and examined at bedside, w/ daughter present, alert and in pain when palpating area around newly placed PEG tube. Pt. noted to have yellow colored gastric content that saturated dressing around PEG site. PEG feeds placed on hold and pt. placed on D5+LR @ 75 cc/hr. Case d/w IR Dr. Brice overnight, will continue to hold feeds overnight and have PEG site reassessed by IR team in AM. Will endorse to day team in AM.    Yoselin Rivas PA-C  Dept. of Medicine  Spectra [phone]

## 2024-04-03 NOTE — PROGRESS NOTE ADULT - ASSESSMENT
74F w PMH T2DM, HTN, HLD, anemia, hypothyroidism, RA, fibromyalgia, remote cerebral aneurysm repair, acute hypercapnic respiratory failure now with tracheostomy, PEG tube (initially placed 2022 at OSH), and bedbound (trach change 8/18, PEG change 8/14/2023), c. diff currently being treated, presents with dislodged PEG tube for last 3 days. PEG balloon is in SQ tissue with surrounding inflammatory changes.     Bedside tube study demonstrates stomach filling with contrast without obvious extravasation of contrast. However unclear if this is due to distal end of G tube being within the gastric lumen or if the contrast is drawn along the tract by gravity.    Plan/Recs  - Recommend IR consultation for replacement of G tube under flouroscopic guidance  - surgery to follow    Patient discussed with Attending Surgeon Dr. Qiu    ACS/Trauma Surgery  [id number]

## 2024-04-03 NOTE — PROGRESS NOTE ADULT - NS ATTEND AMEND GEN_ALL_CORE FT
74 year old female with diabetes, hypertension, hyperlipidemia, hypothyroidism, RA on Prednisone, fibromyalgia, cerebral aneurysm s/p repair, chronic hypercapnic respiratory failure s/p trach (vent dependent) and PEG, recurrent C.diff infection (follows with Dr. Newman) who presented with dislodged PEG.       #Dislodged PEG  She is s/p bedside replacement by IR and appears to be in appropriate position.   Nutrition evaluation is pending as well    #C.Diff  Will plan to continue Dificid as per ID. Appreciate input with this case    #Sacral wound: Pending wound care evaluation    #Positive UA: No localizing symptoms, leukocytosis. Holding antibiotics for now, unless symptoms arise as not to exacerbate her C.Diff.

## 2024-04-03 NOTE — CONSULT NOTE ADULT - SUBJECTIVE AND OBJECTIVE BOX
Patient is a 74y old  Female who presents with a chief complaint of Dislodged G Tube (03 Apr 2024 07:36)    HPI:  75 yo F PMH T2DM on insulin, HTN, HLD, hypothyroidism, RA on Prednisone, Fibromyalgia, cerebral aneurysm s/p repair, acute hypercapnic respiratory failure s/p trach (vent dependent) and PEG (10/22), bedbound presented from home with complaints of possible G tube dislodgement and redness around the site.  Site is red with small amount of pus at insertion site.  Tender to palpation, warm to touch. CT Abdomen/Pelvis done showing dislodgement of G tube with balloon in the anterior abdominal wall with air filled track to stomach.  GI contacted, but unable to replace at bedside given placement of balloon. Surgery consulted--recommend gastrograffin study to eval placement of PEG and possible IR replacement in AM. Given Vancomycin 1 gm IV x 1 due to concern for possible cellulitis.  Of note, patient hemodynamically stable, afebrile, without leukocytosis on presentation.     Per patient's son at bedside, patient currently being treated for C-diff.  Known infection for past 2-3 weeks.  Treated by Dr. Zion Newman, currently receiving Fidaxomicin.  Also report recent UTI--not currently being treated.    Admitted to MICU for ventilator support and treatment of dislodged PEG/abdominal wall cellulitis.  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      H/O tracheostomy      PEG (percutaneous endoscopic gastrostomy) status      SOCIAL HISTORY:  - Denied smoking/vaping/alcohol/recreational drug use    FAMILY HISTORY:      Allergies  pineapple (Unknown)  Tagamet (Unknown)  heparin (Unknown)  walnut (Unknown)  metronidazole (Rash)  penicillin (Unknown)  Lyrica (Unknown)  Pecan, Filbert, Hazelnut (Unknown)        ANTIMICROBIALS:  meropenem  IVPB 1000 every 8 hours      ANTIMICROBIALS (past 90 days):  MEDICATIONS  (STANDING):  meropenem  IVPB   100 mL/Hr IV Intermittent (04-03-24 @ 05:49)   100 mL/Hr IV Intermittent (04-02-24 @ 23:51)    vancomycin  IVPB.   250 mL/Hr IV Intermittent (04-02-24 @ 16:51)        OTHER MEDS:   MEDICATIONS  (STANDING):  albuterol/ipratropium for Nebulization 3 every 6 hours PRN  amLODIPine   Tablet 10 <User Schedule>  escitalopram 10 <User Schedule>  fentaNYL   Patch  25 MICROgram(s)/Hr 1 every 72 hours  gabapentin Solution 100 <User Schedule>  insulin glargine Injectable (LANTUS) 7 <User Schedule>  insulin lispro (ADMELOG) corrective regimen sliding scale  every 6 hours  levothyroxine Injectable 87.5 <User Schedule>  melatonin Liquid 3 at bedtime  pantoprazole  Injectable 40 <User Schedule>  predniSONE  Solution 5 <User Schedule>  QUEtiapine 12.5 <User Schedule>  simethicone 80 every 12 hours      VITALS:  Vital Signs Last 24 Hrs  T(F): 99 (04-03-24 @ 08:00), Max: 100 (04-02-24 @ 21:37)    Vital Signs Last 24 Hrs  HR: 71 (04-03-24 @ 08:03) (65 - 95)  BP: 115/56 (04-03-24 @ 07:00) (91/51 - 163/72)  RR: 13 (04-03-24 @ 07:00)  SpO2: 100% (04-03-24 @ 08:03) (96% - 100%)  Wt(kg): --    EXAM:  General: Patient in no acute distress   HEENT: NCAT, EOMI, PERRL, no oral lesions  CV: S1+S2, no m/r/g appreciated   Lungs: No respiratory distress, CTAB  Abd: Soft, nontender, no guarding, no rebound tenderness, + bowel sounds   Ext: No cyanosis, no edema  Neuro: Alert and oriented, no focal deficits, CN II-XII grossly intact   Skin: No rash   IV: No phlebitis      Labs:                        9.5    8.67  )-----------( 125      ( 02 Apr 2024 23:40 )             31.7     04-02    135  |  99  |  14  ----------------------------<  142<H>  3.6   |  25  |  <0.30<L>    Ca    9.5      02 Apr 2024 23:40  Phos  3.1     04-02  Mg     1.9     04-02    TPro  7.1  /  Alb  3.5  /  TBili  0.6  /  DBili  x   /  AST  17  /  ALT  20  /  AlkPhos  49  04-02      WBC Trend:  WBC Count: 8.67 (04-02-24 @ 23:40)  WBC Count: 9.88 (04-02-24 @ 12:52)      Auto Neutrophil #: 5.80 K/uL (04-02-24 @ 23:40)  Auto Neutrophil #: 8.67 K/uL (04-02-24 @ 12:52)  Band Neutrophils %: 4.3 % (04-02-24 @ 12:52)  Auto Neutrophil #: 6.75 K/uL (02-19-24 @ 15:40)  Band Neutrophils %: 0.9 % (02-19-24 @ 15:40)      Creatine Trend:  Creatinine: <0.30 (04-02)  Creatinine: <0.30 (04-02)      Liver Biochemical Testing Trend:  Alanine Aminotransferase (ALT/SGPT): 20 (04-02)  Alanine Aminotransferase (ALT/SGPT): 20 (04-02)  Alanine Aminotransferase (ALT/SGPT): 24 (02-19)  Alanine Aminotransferase (ALT/SGPT): 38 (01-14)  Alanine Aminotransferase (ALT/SGPT): 20 (12-15)  Aspartate Aminotransferase (AST/SGOT): 17 (04-02-24 @ 23:40)  Aspartate Aminotransferase (AST/SGOT): 19 (04-02-24 @ 12:52)  Aspartate Aminotransferase (AST/SGOT): 28 (02-19-24 @ 15:39)  Aspartate Aminotransferase (AST/SGOT): 39 (01-14-24 @ 14:20)  Aspartate Aminotransferase (AST/SGOT): 21 (12-15-23 @ 06:38)  Bilirubin Total: 0.6 (04-02)  Bilirubin Total: 0.4 (04-02)  Bilirubin Total: 0.3 (02-19)  Bilirubin Total: 0.5 (01-14)  Bilirubin Total: 0.3 (12-15)      Trend LDH  11-24-23 @ 06:42  215      Auto Eosinophil %: 0.1 % (04-02-24 @ 23:40)  Auto Eosinophil %: 0.0 % (04-02-24 @ 12:52)      Urinalysis Basic - ( 03 Apr 2024 00:22 )    Color: Yellow / Appearance: Clear / SG: >1.030 / pH: x  Gluc: x / Ketone: Trace mg/dL  / Bili: Negative / Urobili: 0.2 mg/dL   Blood: x / Protein: Trace mg/dL / Nitrite: Positive   Leuk Esterase: Small / RBC: 3 /HPF / WBC 11 /HPF   Sq Epi: x / Non Sq Epi: 1 /HPF / Bacteria: Many /HPF        MICROBIOLOGY:    MRSA PCR Result.: Detected (04-02-24 @ 23:40)  MRSA PCR Result.: NotDetec (01-05-24 @ 16:20)  MRSA PCR Result.: NotDetec (10-30-23 @ 16:42)      Culture - Body Fluid with Gram Stain (collected 15 Noe 2024 10:29)  Source: .Body Fluid Synovial Fluid  Final Report:    No growth at 14 days.    Culture - Blood (collected 12 Jan 2024 16:29)  Source: .Blood Blood-Peripheral  Final Report:    No growth at 5 days    Culture - Urine (collected 19 Dec 2023 23:51)  Source: Catheterized Catheterized  Final Report:    <10,000 CFU/mL Normal Urogenital Shanda    Culture - Blood (collected 19 Dec 2023 22:23)  Source: .Blood Blood-Peripheral  Final Report:    No growth at 5 days    Culture - Sputum (collected 19 Dec 2023 22:15)  Source: .Sputum Sputum  Final Report:    Numerous Mixed gram negative rods including    Numerous Pseudomonas aeruginosa    Normal Respiratory Shanda present  Organism: Pseudomonas aeruginosa  Organism: Pseudomonas aeruginosa    Sensitivities:      Method Type: LENIN      -  Aztreonam: S <=4      -  Cefepime: S 4      -  Ceftazidime: S <=1      -  Ciprofloxacin: R >2      -  Imipenem: S <=1      -  Levofloxacin: R >4      -  Meropenem: S <=1      -  Piperacillin/Tazobactam: S <=8    Culture - Blood (collected 19 Dec 2023 22:15)  Source: .Blood Blood-Peripheral  Final Report:    No growth at 5 days    Culture - Blood (collected 15 Dec 2023 06:42)  Source: .Blood Blood-Peripheral  Final Report:    No growth at 5 days    Culture - Blood (collected 14 Dec 2023 19:09)  Source: .Blood Blood-Peripheral  Final Report:    No growth at 5 days    Culture - Urine (collected 26 Nov 2023 15:55)  Source: Clean Catch Clean Catch (Midstream)  Final Report:    10,000 - 49,000 CFU/mL Enterococcus faecium (vancomycin resistant)  Organism: Enterococcus faecium (vancomycin resistant)  Organism: Enterococcus faecium (vancomycin resistant)    Sensitivities:      Method Type: LENIN      -  Ampicillin: R >8 Predicts results to ampicillin/sulbactam, amoxacillin-clavulanate and  piperacillin-tazobactam.      -  Ciprofloxacin: R >2      -  Daptomycin: SDD 2 The breakpoint for SDD (susceptible dose dependent)is based on a dosage regimen of 8-12 mg/kg administered every 24 h in adults and is intended for serious infections due to E. faecium. Consultation with an infectious diseases specialist is recommended.      -  Levofloxacin: R >4      -  Linezolid: S 1      -  Nitrofurantoin: S <=32 Should not be used to treat pyelonephritis.      -  Tetracycline: S <=1      -  Vancomycin: R >16    Culture - Sputum (collected 26 Nov 2023 07:38)  Source: .Sputum Sputum  Final Report:    Numerous Pseudomonas aeruginosa    Normal Respiratory Shanda present  Organism: Pseudomonas aeruginosa  Organism: Pseudomonas aeruginosa    Sensitivities:      Method Type: LENIN      -  Aztreonam: S <=4      -  Cefepime: S 4      -  Ceftazidime: S <=1      -  Ciprofloxacin: R >2      -  Imipenem: S 2      -  Levofloxacin: R >4      -  Meropenem: S <=1      -  Piperacillin/Tazobactam: S <=8    Procalcitonin, Serum: 0.08 (04-02)    Blood Gas Venous - Lactate: 3.1 (04-02 @ 23:28)    A1C with Estimated Average Glucose Result: 7.5 % (10-28-23 @ 07:18)      RADIOLOGY:  imaging below personally reviewed    < from: CT Abdomen and Pelvis w/ IV Cont (04.02.24 @ 15:22) >  IMPRESSION:  Dislodged PEG tube with balloon in the anterior abdominal wall with   air-filled tract to stomach.    < end of copied text > Patient is a 74y old  Female who presents with a chief complaint of Dislodged G Tube (03 Apr 2024 07:36)    HPI:  75 yo F PMH T2DM on insulin, HTN, HLD, hypothyroidism, RA on Prednisone, Fibromyalgia, cerebral aneurysm s/p repair, acute hypercapnic respiratory failure s/p trach (vent dependent) and PEG (10/22), bedbound presented from home with complaints of possible G tube dislodgement and redness around the site.  Site is red with small amount of pus at insertion site.  Tender to palpation, warm to touch. CT Abdomen/Pelvis done showing dislodgement of G tube with balloon in the anterior abdominal wall with air filled track to stomach.  GI contacted, but unable to replace at bedside given placement of balloon. Surgery consulted--recommend gastrograffin study to eval placement of PEG and possible IR replacement in AM. Given Vancomycin 1 gm IV x 1 due to concern for possible cellulitis.  Of note, patient hemodynamically stable, afebrile, without leukocytosis on presentation.     Per patient's son at bedside, patient currently being treated for C-diff.  Known infection for past 2-3 weeks.  Treated by Dr. Zion Newman, currently receiving Fidaxomicin.  Also report recent UTI--not currently being treated.    Admitted to MICU for ventilator support and treatment of dislodged PEG/abdominal wall cellulitis.  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      H/O tracheostomy      PEG (percutaneous endoscopic gastrostomy) status      SOCIAL HISTORY:  - Denied smoking/vaping/alcohol/recreational drug use    FAMILY HISTORY:      Allergies  pineapple (Unknown)  Tagamet (Unknown)  heparin (Unknown)  walnut (Unknown)  metronidazole (Rash)  penicillin (Unknown)  Lyrica (Unknown)  Pecan, Filbert, Hazelnut (Unknown)        ANTIMICROBIALS:  meropenem  IVPB 1000 every 8 hours      ANTIMICROBIALS (past 90 days):  MEDICATIONS  (STANDING):  meropenem  IVPB   100 mL/Hr IV Intermittent (04-03-24 @ 05:49)   100 mL/Hr IV Intermittent (04-02-24 @ 23:51)    vancomycin  IVPB.   250 mL/Hr IV Intermittent (04-02-24 @ 16:51)        OTHER MEDS:   MEDICATIONS  (STANDING):  albuterol/ipratropium for Nebulization 3 every 6 hours PRN  amLODIPine   Tablet 10 <User Schedule>  escitalopram 10 <User Schedule>  fentaNYL   Patch  25 MICROgram(s)/Hr 1 every 72 hours  gabapentin Solution 100 <User Schedule>  insulin glargine Injectable (LANTUS) 7 <User Schedule>  insulin lispro (ADMELOG) corrective regimen sliding scale  every 6 hours  levothyroxine Injectable 87.5 <User Schedule>  melatonin Liquid 3 at bedtime  pantoprazole  Injectable 40 <User Schedule>  predniSONE  Solution 5 <User Schedule>  QUEtiapine 12.5 <User Schedule>  simethicone 80 every 12 hours      VITALS:  Vital Signs Last 24 Hrs  T(F): 99 (04-03-24 @ 08:00), Max: 100 (04-02-24 @ 21:37)    Vital Signs Last 24 Hrs  HR: 71 (04-03-24 @ 08:03) (65 - 95)  BP: 115/56 (04-03-24 @ 07:00) (91/51 - 163/72)  RR: 13 (04-03-24 @ 07:00)  SpO2: 100% (04-03-24 @ 08:03) (96% - 100%)  Wt(kg): --    EXAM:  General: Patient in no acute distress  HEENT: NCAT, EOMI, PERRL, no oral lesions  CV: S1+S2, no m/r/g appreciated  Lungs: No respiratory distress, +tracheostomy, CTAB  Abd: Soft, nontender, no guarding, +PEG tube, minimal erythema surrounding site  Ext: No cyanosis, no edema  Neuro: Alert  IV: No phlebitis      Labs:                        9.5    8.67  )-----------( 125      ( 02 Apr 2024 23:40 )             31.7     04-02    135  |  99  |  14  ----------------------------<  142<H>  3.6   |  25  |  <0.30<L>    Ca    9.5      02 Apr 2024 23:40  Phos  3.1     04-02  Mg     1.9     04-02    TPro  7.1  /  Alb  3.5  /  TBili  0.6  /  DBili  x   /  AST  17  /  ALT  20  /  AlkPhos  49  04-02      WBC Trend:  WBC Count: 8.67 (04-02-24 @ 23:40)  WBC Count: 9.88 (04-02-24 @ 12:52)      Auto Neutrophil #: 5.80 K/uL (04-02-24 @ 23:40)  Auto Neutrophil #: 8.67 K/uL (04-02-24 @ 12:52)  Band Neutrophils %: 4.3 % (04-02-24 @ 12:52)  Auto Neutrophil #: 6.75 K/uL (02-19-24 @ 15:40)  Band Neutrophils %: 0.9 % (02-19-24 @ 15:40)      Creatine Trend:  Creatinine: <0.30 (04-02)  Creatinine: <0.30 (04-02)      Liver Biochemical Testing Trend:  Alanine Aminotransferase (ALT/SGPT): 20 (04-02)  Alanine Aminotransferase (ALT/SGPT): 20 (04-02)  Alanine Aminotransferase (ALT/SGPT): 24 (02-19)  Alanine Aminotransferase (ALT/SGPT): 38 (01-14)  Alanine Aminotransferase (ALT/SGPT): 20 (12-15)  Aspartate Aminotransferase (AST/SGOT): 17 (04-02-24 @ 23:40)  Aspartate Aminotransferase (AST/SGOT): 19 (04-02-24 @ 12:52)  Aspartate Aminotransferase (AST/SGOT): 28 (02-19-24 @ 15:39)  Aspartate Aminotransferase (AST/SGOT): 39 (01-14-24 @ 14:20)  Aspartate Aminotransferase (AST/SGOT): 21 (12-15-23 @ 06:38)  Bilirubin Total: 0.6 (04-02)  Bilirubin Total: 0.4 (04-02)  Bilirubin Total: 0.3 (02-19)  Bilirubin Total: 0.5 (01-14)  Bilirubin Total: 0.3 (12-15)      Trend LDH  11-24-23 @ 06:42  215      Auto Eosinophil %: 0.1 % (04-02-24 @ 23:40)  Auto Eosinophil %: 0.0 % (04-02-24 @ 12:52)      Urinalysis Basic - ( 03 Apr 2024 00:22 )    Color: Yellow / Appearance: Clear / SG: >1.030 / pH: x  Gluc: x / Ketone: Trace mg/dL  / Bili: Negative / Urobili: 0.2 mg/dL   Blood: x / Protein: Trace mg/dL / Nitrite: Positive   Leuk Esterase: Small / RBC: 3 /HPF / WBC 11 /HPF   Sq Epi: x / Non Sq Epi: 1 /HPF / Bacteria: Many /HPF        MICROBIOLOGY:    MRSA PCR Result.: Detected (04-02-24 @ 23:40)  MRSA PCR Result.: NotDetec (01-05-24 @ 16:20)  MRSA PCR Result.: NotDetec (10-30-23 @ 16:42)      Culture - Body Fluid with Gram Stain (collected 15 Noe 2024 10:29)  Source: .Body Fluid Synovial Fluid  Final Report:    No growth at 14 days.    Culture - Blood (collected 12 Jan 2024 16:29)  Source: .Blood Blood-Peripheral  Final Report:    No growth at 5 days    Culture - Urine (collected 19 Dec 2023 23:51)  Source: Catheterized Catheterized  Final Report:    <10,000 CFU/mL Normal Urogenital Shanda    Culture - Blood (collected 19 Dec 2023 22:23)  Source: .Blood Blood-Peripheral  Final Report:    No growth at 5 days    Culture - Sputum (collected 19 Dec 2023 22:15)  Source: .Sputum Sputum  Final Report:    Numerous Mixed gram negative rods including    Numerous Pseudomonas aeruginosa    Normal Respiratory Shanda present  Organism: Pseudomonas aeruginosa  Organism: Pseudomonas aeruginosa    Sensitivities:      Method Type: LENIN      -  Aztreonam: S <=4      -  Cefepime: S 4      -  Ceftazidime: S <=1      -  Ciprofloxacin: R >2      -  Imipenem: S <=1      -  Levofloxacin: R >4      -  Meropenem: S <=1      -  Piperacillin/Tazobactam: S <=8    Culture - Blood (collected 19 Dec 2023 22:15)  Source: .Blood Blood-Peripheral  Final Report:    No growth at 5 days    Culture - Blood (collected 15 Dec 2023 06:42)  Source: .Blood Blood-Peripheral  Final Report:    No growth at 5 days    Culture - Blood (collected 14 Dec 2023 19:09)  Source: .Blood Blood-Peripheral  Final Report:    No growth at 5 days    Culture - Urine (collected 26 Nov 2023 15:55)  Source: Clean Catch Clean Catch (Midstream)  Final Report:    10,000 - 49,000 CFU/mL Enterococcus faecium (vancomycin resistant)  Organism: Enterococcus faecium (vancomycin resistant)  Organism: Enterococcus faecium (vancomycin resistant)    Sensitivities:      Method Type: LENIN      -  Ampicillin: R >8 Predicts results to ampicillin/sulbactam, amoxacillin-clavulanate and  piperacillin-tazobactam.      -  Ciprofloxacin: R >2      -  Daptomycin: SDD 2 The breakpoint for SDD (susceptible dose dependent)is based on a dosage regimen of 8-12 mg/kg administered every 24 h in adults and is intended for serious infections due to E. faecium. Consultation with an infectious diseases specialist is recommended.      -  Levofloxacin: R >4      -  Linezolid: S 1      -  Nitrofurantoin: S <=32 Should not be used to treat pyelonephritis.      -  Tetracycline: S <=1      -  Vancomycin: R >16    Culture - Sputum (collected 26 Nov 2023 07:38)  Source: .Sputum Sputum  Final Report:    Numerous Pseudomonas aeruginosa    Normal Respiratory Shanda present  Organism: Pseudomonas aeruginosa  Organism: Pseudomonas aeruginosa    Sensitivities:      Method Type: LENIN      -  Aztreonam: S <=4      -  Cefepime: S 4      -  Ceftazidime: S <=1      -  Ciprofloxacin: R >2      -  Imipenem: S 2      -  Levofloxacin: R >4      -  Meropenem: S <=1      -  Piperacillin/Tazobactam: S <=8    Procalcitonin, Serum: 0.08 (04-02)    Blood Gas Venous - Lactate: 3.1 (04-02 @ 23:28)    A1C with Estimated Average Glucose Result: 7.5 % (10-28-23 @ 07:18)      RADIOLOGY:  imaging below personally reviewed    < from: CT Abdomen and Pelvis w/ IV Cont (04.02.24 @ 15:22) >  IMPRESSION:  Dislodged PEG tube with balloon in the anterior abdominal wall with   air-filled tract to stomach.    < end of copied text >

## 2024-04-03 NOTE — CONSULT NOTE ADULT - ASSESSMENT
74yoF h/o T2DM, HTN, HLD, anemia, hypothyroidism, RA, fibromyalgia, remote cerebral aneurysm repair, acute hypercapnic respiratory failure with tracheostomy, PEG tube (initially placed 2022), and bedbound, recurrent C diff presented for PEG tube dislodgment.    Recurrent C diff - first episode 1/31/24 - most recently tested positive 3/20/24 seen by Dr. Newman 3/29 outpatient, started on fidaxomicin  Chronic sacral decubitus wound  3/20 Klebisella bacteruria R only to amp and sputum few Pseudomonas R to FQs w/o polys on gram stian - treatment of urine was deferred to avoid exacerbating C diff    Tmax 100, no leukocytosis  Concern for cellulitis around PEG tube site  UA 11 WBC  CT a/p: no infectious focus or colitis  MRSA PCR+    Blood and urine cultures pending - obtained after vancomycin administered  Started on meropenem 4/2 74yoF h/o T2DM, HTN, HLD, anemia, hypothyroidism, RA, fibromyalgia, remote cerebral aneurysm repair, acute hypercapnic respiratory failure with tracheostomy, PEG tube (initially placed 2022), and bedbound, recurrent C diff presented for PEG tube dislodgment.    Recurrent C diff - first episode Aug 2023 treated with tapering PO vanco, recurrent episode 1/31/24 treated with PO vanco and then fidaxomicin completed in Feb - most recently tested positive 3/20/24 seen by Dr. Newman 3/29 outpatient, started on fidaxomicin that day - tube feeds concurrently held, unclear if improving afterwards per family  Chronic sacral decubitus wound  3/20 Klebisella bacteruria R only to amp and sputum few Pseudomonas R to FQs w/o polys on gram stian - treatment of urine was deferred to avoid exacerbating C diff    Tmax 100, no leukocytosis  Concern for cellulitis around PEG tube site - area with very minimal erythema, remarkable receded from skin paula placed on admission  UA 11 WBC  CT a/p: no infectious focus or colitis  MRSA PCR+    Blood and urine cultures pending - obtained after vancomycin administered  Started on meropenem 4/2 74yoF h/o T2DM, HTN, HLD, anemia, hypothyroidism, RA, fibromyalgia, remote cerebral aneurysm repair, acute hypercapnic respiratory failure with tracheostomy, PEG tube (initially placed 2022), and bedbound, recurrent C diff presented for PEG tube dislodgment.    Recurrent C diff - first episode Aug 2023 treated with tapering PO vanco, recurrent episode 1/31/24 treated with PO vanco and then fidaxomicin completed in Feb - most recently tested positive 3/20/24 seen by Dr. Newman 3/29 outpatient, started on fidaxomicin that day - tube feeds concurrently held, unclear if improving afterwards per family  Chronic sacral decubitus wound  3/20 Klebisella bacteruria R only to amp and sputum few Pseudomonas R to FQs w/o polys on gram stian - treatment of urine was deferred to avoid exacerbating C diff    Tmax 100, no leukocytosis  Concern for cellulitis around PEG tube site - area with very minimal erythema, remarkable receded from skin paula placed on admission  UA 11 WBC  CT a/p: no infectious focus or colitis  MRSA PCR+    Blood and urine cultures pending - obtained after vancomycin administered  Started on meropenem 4/2    PEG site no obvious cellulitis - probably more irritation from displacement/leaked contents, no indication for antimicrobials.    Suggest:  - will continue fidaxomicin for 5 more days (total 10 day course) - will order  - otherwise monitor off Abx  - monitor bowel movements/output    d/w attending.    Tommy Fiore, PGY4  ID Fellow  [employer] Preferred  After 5pm/weekends call [phone]

## 2024-04-03 NOTE — CONSULT NOTE ADULT - SUBJECTIVE AND OBJECTIVE BOX
Interventional Radiology    Evaluate for Procedure: PEG Exchange    HPI: 74yoF h/o T2DM, HTN, HLD, anemia, hypothyroidism, RA, fibromyalgia, remote cerebral aneurysm repair, acute hypercapnic respiratory failure with tracheostomy, PEG tube (initially placed 2022), and bedbound, recurrent C diff presented for PEG tube dislodgment.    Allergies: pineapple (Unknown)  Tagamet (Unknown)  heparin (Unknown)  metronidazole (Rash)  penicillin (Unknown)  Lyrica (Unknown)    Medications (Abx/Cardiac/Anticoagulation/Blood Products)    meropenem  IVPB: 100 mL/Hr IV Intermittent (04-03 @ 05:49)  vancomycin  IVPB.: 250 mL/Hr IV Intermittent (04-02 @ 16:51)    Data:  152.4  54.5  T(C): 37.2  HR: 62  BP: 116/56  RR: 12  SpO2: 100%    -WBC 8.67 / HgB 9.5 / Hct 31.7 / Plt 125  -Na 135 / Cl 99 / BUN 14 / Glucose 142  -K 3.6 / CO2 25 / Cr <0.30  -ALT 20 / Alk Phos 49 / T.Bili 0.6  -INR 1.27 / PTT 28.3    Assessment/Plan: 74yoF h/o T2DM, HTN, HLD, anemia, hypothyroidism, RA, fibromyalgia, remote cerebral aneurysm repair, acute hypercapnic respiratory failure with tracheostomy, PEG tube (initially placed 2022), and bedbound, recurrent C diff presented for PEG tube dislodgment.    - PEG exchanged bedside to 20 Fr [facility]  - Preliminary XR demonstrates ...  - d/w primary team Interventional Radiology    Evaluate for Procedure: PEG Exchange    HPI: 74yoF h/o T2DM, HTN, HLD, anemia, hypothyroidism, RA, fibromyalgia, remote cerebral aneurysm repair, acute hypercapnic respiratory failure with tracheostomy, PEG tube (initially placed 2022), and bedbound, recurrent C diff presented for PEG tube dislodgment.    Allergies: pineapple (Unknown)  Tagamet (Unknown)  heparin (Unknown)  metronidazole (Rash)  penicillin (Unknown)  Lyrica (Unknown)    Medications (Abx/Cardiac/Anticoagulation/Blood Products)    meropenem  IVPB: 100 mL/Hr IV Intermittent (04-03 @ 05:49)  vancomycin  IVPB.: 250 mL/Hr IV Intermittent (04-02 @ 16:51)    Data:  152.4  54.5  T(C): 37.2  HR: 62  BP: 116/56  RR: 12  SpO2: 100%    -WBC 8.67 / HgB 9.5 / Hct 31.7 / Plt 125  -Na 135 / Cl 99 / BUN 14 / Glucose 142  -K 3.6 / CO2 25 / Cr <0.30  -ALT 20 / Alk Phos 49 / T.Bili 0.6  -INR 1.27 / PTT 28.3    Assessment/Plan: 74yoF h/o T2DM, HTN, HLD, anemia, hypothyroidism, RA, fibromyalgia, remote cerebral aneurysm repair, acute hypercapnic respiratory failure with tracheostomy, PEG tube (initially placed 2022), and bedbound, recurrent C diff presented for PEG tube dislodgment.    - [facility]  - Bedside XR demonstrates appropriately positioned G-tube with contrast filling into the stomach and bowel.  - d/w primary team Interventional Radiology    Evaluate for Procedure: PEG Exchange    HPI: 74yoF h/o T2DM, HTN, HLD, anemia, hypothyroidism, RA, fibromyalgia, remote cerebral aneurysm repair, acute hypercapnic respiratory failure with tracheostomy, PEG tube (initially placed 2022), and bedbound, recurrent C diff presented for PEG tube dislodgment.    Allergies: pineapple (Unknown)  Tagamet (Unknown)  heparin (Unknown)  metronidazole (Rash)  penicillin (Unknown)  Lyrica (Unknown)    Medications (Abx/Cardiac/Anticoagulation/Blood Products)    meropenem  IVPB: 100 mL/Hr IV Intermittent (04-03 @ 05:49)  vancomycin  IVPB.: 250 mL/Hr IV Intermittent (04-02 @ 16:51)    Data:  152.4  54.5  T(C): 37.2  HR: 62  BP: 116/56  RR: 12  SpO2: 100%    -WBC 8.67 / HgB 9.5 / Hct 31.7 / Plt 125  -Na 135 / Cl 99 / BUN 14 / Glucose 142  -K 3.6 / CO2 25 / Cr <0.30  -ALT 20 / Alk Phos 49 / T.Bili 0.6  -INR 1.27 / PTT 28.3    Assessment/Plan: 74yoF h/o T2DM, HTN, HLD, anemia, hypothyroidism, RA, fibromyalgia, remote cerebral aneurysm repair, acute hypercapnic respiratory failure with tracheostomy, PEG tube (initially placed 2022), and bedbound, recurrent C diff presented for PEG tube dislodgment.    - PEG exchanged bedside to 20 Fr [employer]  - Bedside XR demonstrates appropriately positioned G-tube with contrast filling into the stomach and bowel.  - ok to use as clinically indicated  - d/w primary team

## 2024-04-03 NOTE — CHART NOTE - NSCHARTNOTEFT_GEN_A_CORE
MICU Transfer Note  ---------------------------    Transfer from: MICU  Transfer to:  (  ) Medicine    (  ) Telemetry    (X ) RCU    (  ) Palliative    (  ) Stroke Unit    (  ) _______________  Accepting Physician:  Bed Assignment:  Signout given to ______      HPI:  73 yo F PMH T2DM on insulin, HTN, HLD, hypothyroidism, RA on Prednisone, Fibromyalgia, cerebral aneurysm s/p repair, acute hypercapnic respiratory failure s/p trach (vent dependent) and PEG (10/22), bedbound presented from home with complaints of possible G tube dislodgement and redness around the site.  Site is red with small amount of pus at insertion site.  Tender to palpation, warm to touch. CT Abdomen/Pelvis done showing dislodgement of G tube with balloon in the anterior abdominal wall with air filled track to stomach.  GI contacted, but unable to replace at bedside given placement of balloon. Surgery consulted--recommend gastrograffin study to eval placement of PEG and possible IR replacement in AM. Given Vancomycin 1 gm IV x 1 due to concern for possible cellulitis.  Of note, patient hemodynamically stable, afebrile, without leukocytosis on presentation.     Per patient's son at bedside, patient currently being treated for C-diff.  Known infection for past 2-3 weeks.  Treated by Dr. Zion Newman, currently receiving Fidaxomicin.  Also report recent UTI--not currently being treated.    Admitted to MICU for ventilator support and treatment of dislodged PEG/abdominal wall cellulitis.  (02 Apr 2024 23:07)      MICU COURSE  Patient hemodynamically stable. Experiencing some pain around PEG site controlled with Ofirmev. ID consulted, recommend restarting PO vancomycin after PEG tube replacement, but otherwise hold antibiotics. IR consulted for PEG exchange. Stable for transfer to RCU.      FOR FOLLOW-UP  [ ] f/u IR consult re PEG exchange  [ ] restart home meds after PEG exchange  [ ] Start PO vancomycin after PEG exchange  [ ] f/u wound care consult  [ ] f/u nutrition consult      OBJECTIVE --  Vital Signs Last 24 Hrs  T(C): 37.2 (03 Apr 2024 08:00), Max: 37.8 (02 Apr 2024 21:37)  T(F): 99 (03 Apr 2024 08:00), Max: 100 (02 Apr 2024 21:37)  HR: 62 (03 Apr 2024 10:00) (62 - 95)  BP: 111/55 (03 Apr 2024 10:00) (91/51 - 163/72)  BP(mean): 79 (03 Apr 2024 10:00) (67 - 111)  RR: 12 (03 Apr 2024 10:00) (12 - 28)  SpO2: 100% (03 Apr 2024 10:00) (96% - 100%)    Parameters below as of 03 Apr 2024 08:00  Patient On (Oxygen Delivery Method): ventilator    O2 Concentration (%): 30    I&O's Summary    02 Apr 2024 07:01  -  03 Apr 2024 07:00  --------------------------------------------------------  IN: 650 mL / OUT: 250 mL / NET: 400 mL    03 Apr 2024 07:01  -  03 Apr 2024 10:56  --------------------------------------------------------  IN: 100 mL / OUT: 0 mL / NET: 100 mL        MEDICATIONS  (STANDING):  amLODIPine   Tablet 10 milliGRAM(s) Oral <User Schedule>  artificial tears (preservative free) Ophthalmic Solution 2 Drop(s) Both EYES every 6 hours  ascorbic acid 500 milliGRAM(s) Oral daily  Biotene Dry Mouth Oral Rinse 5 milliLiter(s) Swish and Spit every 6 hours  chlorhexidine 0.12% Liquid 15 milliLiter(s) Oral Mucosa every 12 hours  chlorhexidine 2% Cloths 1 Application(s) Topical daily  escitalopram 10 milliGRAM(s) Oral <User Schedule>  fentaNYL   Patch  25 MICROgram(s)/Hr 1 Patch Transdermal every 72 hours  gabapentin Solution 100 milliGRAM(s) Oral <User Schedule>  insulin glargine Injectable (LANTUS) 7 Unit(s) SubCutaneous <User Schedule>  insulin lispro (ADMELOG) corrective regimen sliding scale   SubCutaneous every 6 hours  lactated ringers. 1000 milliLiter(s) (50 mL/Hr) IV Continuous <Continuous>  levothyroxine Injectable 87.5 MICROGram(s) IV Push <User Schedule>  lidocaine   4% Patch 1 Patch Transdermal at bedtime  lidocaine   4% Patch 1 Patch Transdermal at bedtime  melatonin Liquid 3 milliGRAM(s) Oral at bedtime  multivitamin 1 Tablet(s) Oral daily  mupirocin 2% Nasal 1 Application(s) Both Nostrils once  pantoprazole  Injectable 40 milliGRAM(s) IV Push <User Schedule>  predniSONE  Solution 5 milliGRAM(s) Oral <User Schedule>  QUEtiapine 12.5 milliGRAM(s) Oral <User Schedule>  simethicone 80 milliGRAM(s) Chew every 12 hours  tobramycin 0.3% Ophthalmic Solution 2 Drop(s) Both EYES every 6 hours    MEDICATIONS  (PRN):  albuterol/ipratropium for Nebulization 3 milliLiter(s) Nebulizer every 6 hours PRN Shortness of Breath and/or Wheezing  petrolatum Ophthalmic Ointment 1 Application(s) Both EYES every 6 hours PRN eye dryness  sodium chloride 0.65% Nasal 1 Spray(s) Both Nostrils every 12 hours PRN Congestion      PHYSICAL EXAM  General: NAD with tracheostomy  HEENT: PERRLA, EOMI, non-icteric  Neck:  symmetric,  JVD absent  Respiratory: Clear to ascultation bilaterally, no crackles/rales, no Resp distress; no accessory muscle use  Cardiovascular:  RRR, no murmurs/rubs/gallops  Abdomen: Soft, tender to palpation around PEG sit  Extremities: No edema noted  Skin: No rashes or lesions noted  Neurological: Intact  Psychiatry: AOx3      LABS                                            9.5                   Neurophils% (auto):   66.9   (04-02 @ 23:40):    8.67 )-----------(125          Lymphocytes% (auto):  22.6                                          31.7                   Eosinphils% (auto):   0.1      Manual%: Neutrophils x    ; Lymphocytes x    ; Eosinophils x    ; Bands%: x    ; Blasts x                                    135    |  99     |  14                  Calcium: 9.5   / iCa: x      (04-02 @ 23:40)    ----------------------------<  142       Magnesium: 1.9                              3.6     |  25     |  <0.30            Phosphorous: 3.1      TPro  7.1    /  Alb  3.5    /  TBili  0.6    /  DBili  x      /  AST  17     /  ALT  20     /  AlkPhos  49     02 Apr 2024 23:40    ( 04-02 @ 23:40 )   PT: 13.2 sec;   INR: 1.27 ratio  aPTT: 28.3 sec      Urinalysis Basic - ( 03 Apr 2024 00:22 )    Color: Yellow / Appearance: Clear / SG: >1.030 / pH: x  Gluc: x / Ketone: Trace mg/dL  / Bili: Negative / Urobili: 0.2 mg/dL   Blood: x / Protein: Trace mg/dL / Nitrite: Positive   Leuk Esterase: Small / RBC: 3 /HPF / WBC 11 /HPF   Sq Epi: x / Non Sq Epi: 1 /HPF / Bacteria: Many /HPF          ASSESSMENT & PLAN:  73 yo F with complex PMH significant for RA, hypercarbic respiratory s/p Trach and PEG, ventilator dependent, T2DM, HTN, C-difficile presented with dislodged PEG with site concerning for cellulitis.         Neuro:  -Alert, able to mouth words, bedbound at baseline  -Continue routine neuro checks  -Currently holding Seroquel, Gabapentin as patient NPO  -Continue Fentanyl patch  -Tylenol IV, Morphine prn pain as needed  -Continue Lidocaine patch for pain        HEENT:   -Continue Tobramycin eye drops for home  -Continue Artificial Tears  -[place] in right ear per patient's son        Pulm:  *Chronic Hypoxemic/Hypercapnic Respiratory Failure  -Trach to vent--8XLT Mat at bedside  -Continue mechanical ventilation--12/300/30/5  -Continuous pulse oximetry  -Duoneb prn  -Trach care, suction prn        CV:  *HTN  -Hold Amlodipine as NPO  -VS q1h  -Treat SBP > 170 with Labetalol as needed  -Telemetry monitoring--sinus rhythm    *Hx of HLD  -Send Lipid Panel      GI:  -PEG tube dislodged--currently NPO  -GI and Surgery consulted  -Gastrograffin study as per Surgery  -Consider IR consult  in AM for replacement based on PEG study  -Hold Bowel regimen and tube feeds at this time  -Stool count    Renal:  -No acute issues  -Trend Cr daily  -Optimize electrolytes  -Intake and output per routine      ID:   * Concern for cellulitis at PEG site  --Will send blood cultures x 2  --ID consulted, appreciate recs  --DC'd meropenem 4/3    *C diff (being treated outpt by Dr Newman)  --Contact Precautions  --Hold Fidaxomicin for now as NPO (per family can only be treated with Fidaxomicin and Vanco PO)  --Allergy to Flagyl   --Stool Count  -restart PO vancomycin after PEG tube replacement    *UTI--previous VRE  --Will send UA as reportedly with UTI recently outpatient  -monitor off antiboiotics    *History of CRE Pseudomonas Pna  --no s/s of active pna at present        Endo:  T2DM  --Lantus 15 units daily---ordered for 7units as patient NPO  --Hold Novolin as NPO--normally takes 12 units at noon, 6 units at 6Am and 6PM  --ISS q6H      Hypothyroid:  --Synthroid 125mcg PO daily, convert to IV for now    Consider Endo consult for further management      Immune: Hx of RA  -Continue Prednsione 5mg qd  --If unable to use PEG tomorrow, will convert to IV     Skin:     *Small Sacral Ulcer present on admit  --Consider Wound Consult  --Turn and position q2h and prn    *IAD  --Pericare as needed      PPX:  PAS (allergy to heparin), Protonix (home med)      GOC: DNR. Next of kin/primary decision maker is daughter Marysol [phone] MICU Transfer Note  ---------------------------    Transfer from: MICU  Transfer to:  (  ) Medicine    (  ) Telemetry    (X ) RCU    (  ) Palliative    (  ) Stroke Unit    (  ) _______________  Accepting Physician:  Bed Assignment:   Signout given to: Janak      HPI:  73 yo F PMH T2DM on insulin, HTN, HLD, hypothyroidism, RA on Prednisone, Fibromyalgia, cerebral aneurysm s/p repair, acute hypercapnic respiratory failure s/p trach (vent dependent) and PEG (10/22), bedbound presented from home with complaints of possible G tube dislodgement and redness around the site.  Site is red with small amount of pus at insertion site.  Tender to palpation, warm to touch. CT Abdomen/Pelvis done showing dislodgement of G tube with balloon in the anterior abdominal wall with air filled track to stomach.  GI contacted, but unable to replace at bedside given placement of balloon. Surgery consulted--recommend gastrograffin study to eval placement of PEG and possible IR replacement in AM. Given Vancomycin 1 gm IV x 1 due to concern for possible cellulitis.  Of note, patient hemodynamically stable, afebrile, without leukocytosis on presentation.     Per patient's son at bedside, patient currently being treated for C-diff.  Known infection for past 2-3 weeks.  Treated by Dr. Zion Newman, currently receiving Fidaxomicin.  Also report recent UTI--not currently being treated.    Admitted to MICU for ventilator support and treatment of dislodged PEG/abdominal wall cellulitis.  (02 Apr 2024 23:07)      MICU COURSE  Patient hemodynamically stable. Experiencing some pain around PEG site controlled with Ofirmev. ID consulted, recommend restarting PO vancomycin after PEG tube replacement, but otherwise hold antibiotics. IR consulted for PEG exchange. Stable for transfer to RCU.      FOR FOLLOW-UP  [ ] f/u IR consult re PEG exchange  [ ] restart home meds after PEG exchange  [ ] Start PO vancomycin after PEG exchange  [ ] f/u wound care consult  [ ] f/u nutrition consult      OBJECTIVE --  Vital Signs Last 24 Hrs  T(C): 37.2 (03 Apr 2024 08:00), Max: 37.8 (02 Apr 2024 21:37)  T(F): 99 (03 Apr 2024 08:00), Max: 100 (02 Apr 2024 21:37)  HR: 62 (03 Apr 2024 10:00) (62 - 95)  BP: 111/55 (03 Apr 2024 10:00) (91/51 - 163/72)  BP(mean): 79 (03 Apr 2024 10:00) (67 - 111)  RR: 12 (03 Apr 2024 10:00) (12 - 28)  SpO2: 100% (03 Apr 2024 10:00) (96% - 100%)    Parameters below as of 03 Apr 2024 08:00  Patient On (Oxygen Delivery Method): ventilator    O2 Concentration (%): 30    I&O's Summary    02 Apr 2024 07:01  -  03 Apr 2024 07:00  --------------------------------------------------------  IN: 650 mL / OUT: 250 mL / NET: 400 mL    03 Apr 2024 07:01  -  03 Apr 2024 10:56  --------------------------------------------------------  IN: 100 mL / OUT: 0 mL / NET: 100 mL        MEDICATIONS  (STANDING):  amLODIPine   Tablet 10 milliGRAM(s) Oral <User Schedule>  artificial tears (preservative free) Ophthalmic Solution 2 Drop(s) Both EYES every 6 hours  ascorbic acid 500 milliGRAM(s) Oral daily  Biotene Dry Mouth Oral Rinse 5 milliLiter(s) Swish and Spit every 6 hours  chlorhexidine 0.12% Liquid 15 milliLiter(s) Oral Mucosa every 12 hours  chlorhexidine 2% Cloths 1 Application(s) Topical daily  escitalopram 10 milliGRAM(s) Oral <User Schedule>  fentaNYL   Patch  25 MICROgram(s)/Hr 1 Patch Transdermal every 72 hours  gabapentin Solution 100 milliGRAM(s) Oral <User Schedule>  insulin glargine Injectable (LANTUS) 7 Unit(s) SubCutaneous <User Schedule>  insulin lispro (ADMELOG) corrective regimen sliding scale   SubCutaneous every 6 hours  lactated ringers. 1000 milliLiter(s) (50 mL/Hr) IV Continuous <Continuous>  levothyroxine Injectable 87.5 MICROGram(s) IV Push <User Schedule>  lidocaine   4% Patch 1 Patch Transdermal at bedtime  lidocaine   4% Patch 1 Patch Transdermal at bedtime  melatonin Liquid 3 milliGRAM(s) Oral at bedtime  multivitamin 1 Tablet(s) Oral daily  mupirocin 2% Nasal 1 Application(s) Both Nostrils once  pantoprazole  Injectable 40 milliGRAM(s) IV Push <User Schedule>  predniSONE  Solution 5 milliGRAM(s) Oral <User Schedule>  QUEtiapine 12.5 milliGRAM(s) Oral <User Schedule>  simethicone 80 milliGRAM(s) Chew every 12 hours  tobramycin 0.3% Ophthalmic Solution 2 Drop(s) Both EYES every 6 hours    MEDICATIONS  (PRN):  albuterol/ipratropium for Nebulization 3 milliLiter(s) Nebulizer every 6 hours PRN Shortness of Breath and/or Wheezing  petrolatum Ophthalmic Ointment 1 Application(s) Both EYES every 6 hours PRN eye dryness  sodium chloride 0.65% Nasal 1 Spray(s) Both Nostrils every 12 hours PRN Congestion      PHYSICAL EXAM  General: NAD with tracheostomy  HEENT: PERRLA, EOMI, non-icteric  Neck:  symmetric,  JVD absent  Respiratory: Clear to ascultation bilaterally, no crackles/rales, no Resp distress; no accessory muscle use  Cardiovascular:  RRR, no murmurs/rubs/gallops  Abdomen: Soft, tender to palpation around PEG sit  Extremities: No edema noted  Skin: No rashes or lesions noted  Neurological: Intact  Psychiatry: AOx3      LABS                                            9.5                   Neurophils% (auto):   66.9   (04-02 @ 23:40):    8.67 )-----------(125          Lymphocytes% (auto):  22.6                                          31.7                   Eosinphils% (auto):   0.1      Manual%: Neutrophils x    ; Lymphocytes x    ; Eosinophils x    ; Bands%: x    ; Blasts x                                    135    |  99     |  14                  Calcium: 9.5   / iCa: x      (04-02 @ 23:40)    ----------------------------<  142       Magnesium: 1.9                              3.6     |  25     |  <0.30            Phosphorous: 3.1      TPro  7.1    /  Alb  3.5    /  TBili  0.6    /  DBili  x      /  AST  17     /  ALT  20     /  AlkPhos  49     02 Apr 2024 23:40    ( 04-02 @ 23:40 )   PT: 13.2 sec;   INR: 1.27 ratio  aPTT: 28.3 sec      Urinalysis Basic - ( 03 Apr 2024 00:22 )    Color: Yellow / Appearance: Clear / SG: >1.030 / pH: x  Gluc: x / Ketone: Trace mg/dL  / Bili: Negative / Urobili: 0.2 mg/dL   Blood: x / Protein: Trace mg/dL / Nitrite: Positive   Leuk Esterase: Small / RBC: 3 /HPF / WBC 11 /HPF   Sq Epi: x / Non Sq Epi: 1 /HPF / Bacteria: Many /HPF          ASSESSMENT & PLAN:  73 yo F with complex PMH significant for RA, hypercarbic respiratory s/p Trach and PEG, ventilator dependent, T2DM, HTN, C-difficile presented with dislodged PEG with site concerning for cellulitis.         Neuro:  -Alert, able to mouth words, bedbound at baseline  -Continue routine neuro checks  -Currently holding Seroquel, Gabapentin as patient NPO  -Continue Fentanyl patch  -Tylenol IV, Morphine prn pain as needed  -Continue Lidocaine patch for pain        HEENT:   -Continue Tobramycin eye drops for home  -Continue Artificial Tears  -[place] in right ear per patient's son        Pulm:  *Chronic Hypoxemic/Hypercapnic Respiratory Failure  -Trach to vent--8XLT Mat at bedside  -Continue mechanical ventilation--12/300/30/5  -Continuous pulse oximetry  -Duoneb prn  -Trach care, suction prn        CV:  *HTN  -Hold Amlodipine as NPO  -VS q1h  -Treat SBP > 170 with Labetalol as needed  -Telemetry monitoring--sinus rhythm    *Hx of HLD  -Send Lipid Panel      GI:  -PEG tube dislodged--currently NPO  -GI and Surgery consulted  -Gastrograffin study as per Surgery  -Consider IR consult  in AM for replacement based on PEG study  -Hold Bowel regimen and tube feeds at this time  -Stool count    Renal:  -No acute issues  -Trend Cr daily  -Optimize electrolytes  -Intake and output per routine      ID:   * Concern for cellulitis at PEG site  --Will send blood cultures x 2  --ID consulted, appreciate recs  --DC'd meropenem 4/3    *C diff (being treated outpt by Dr Newman)  --Contact Precautions  --Hold Fidaxomicin for now as NPO (per family can only be treated with Fidaxomicin and Vanco PO)  --Allergy to Flagyl   --Stool Count  -restart PO vancomycin after PEG tube replacement    *UTI--previous VRE  --Will send UA as reportedly with UTI recently outpatient  -monitor off antiboiotics    *History of CRE Pseudomonas Pna  --no s/s of active pna at present        Endo:  T2DM  --Lantus 15 units daily---ordered for 7units as patient NPO  --Hold Novolin as NPO--normally takes 12 units at noon, 6 units at 6Am and 6PM  --ISS q6H      Hypothyroid:  --Synthroid 125mcg PO daily, convert to IV for now    Consider Endo consult for further management      Immune: Hx of RA  -Continue Prednsione 5mg qd  --If unable to use PEG tomorrow, will convert to IV     Skin:     *Small Sacral Ulcer present on admit  --Consider Wound Consult  --Turn and position q2h and prn    *IAD  --Pericare as needed      PPX:  PAS (allergy to heparin), Protonix (home med)      GOC: DNR. Next of kin/primary decision maker is daughter Marysol [phone]

## 2024-04-03 NOTE — PROGRESS NOTE ADULT - TIME BILLING
Medical management as above, reviewing chart and coordinating care with primary team/staff, as well as reviewing vitals, radiology, medication list, recent labs, and prior records.    Does not include teaching time.

## 2024-04-04 LAB
A1C WITH ESTIMATED AVERAGE GLUCOSE RESULT: 6.2 % — HIGH (ref 4–5.6)
ALBUMIN SERPL ELPH-MCNC: 3.3 G/DL — SIGNIFICANT CHANGE UP (ref 3.3–5)
ALP SERPL-CCNC: 43 U/L — SIGNIFICANT CHANGE UP (ref 40–120)
ALT FLD-CCNC: 20 U/L — SIGNIFICANT CHANGE UP (ref 10–45)
ANION GAP SERPL CALC-SCNC: 13 MMOL/L — SIGNIFICANT CHANGE UP (ref 5–17)
APTT BLD: 30.3 SEC — SIGNIFICANT CHANGE UP (ref 24.5–35.6)
AST SERPL-CCNC: 23 U/L — SIGNIFICANT CHANGE UP (ref 10–40)
BASE EXCESS BLDV CALC-SCNC: -0.6 MMOL/L — SIGNIFICANT CHANGE UP (ref -2–3)
BASOPHILS # BLD AUTO: 0.02 K/UL — SIGNIFICANT CHANGE UP (ref 0–0.2)
BASOPHILS NFR BLD AUTO: 0.3 % — SIGNIFICANT CHANGE UP (ref 0–2)
BILIRUB SERPL-MCNC: 0.5 MG/DL — SIGNIFICANT CHANGE UP (ref 0.2–1.2)
BUN SERPL-MCNC: 10 MG/DL — SIGNIFICANT CHANGE UP (ref 7–23)
CA-I SERPL-SCNC: 1.25 MMOL/L — SIGNIFICANT CHANGE UP (ref 1.15–1.33)
CALCIUM SERPL-MCNC: 9.1 MG/DL — SIGNIFICANT CHANGE UP (ref 8.4–10.5)
CHLORIDE BLDV-SCNC: 103 MMOL/L — SIGNIFICANT CHANGE UP (ref 96–108)
CHLORIDE SERPL-SCNC: 102 MMOL/L — SIGNIFICANT CHANGE UP (ref 96–108)
CHOLEST SERPL-MCNC: 130 MG/DL — SIGNIFICANT CHANGE UP
CO2 BLDV-SCNC: 28 MMOL/L — HIGH (ref 22–26)
CO2 SERPL-SCNC: 25 MMOL/L — SIGNIFICANT CHANGE UP (ref 22–31)
CREAT SERPL-MCNC: 0.33 MG/DL — LOW (ref 0.5–1.3)
CULTURE RESULTS: SIGNIFICANT CHANGE UP
EGFR: 110 ML/MIN/1.73M2 — SIGNIFICANT CHANGE UP
EOSINOPHIL # BLD AUTO: 0.02 K/UL — SIGNIFICANT CHANGE UP (ref 0–0.5)
EOSINOPHIL NFR BLD AUTO: 0.3 % — SIGNIFICANT CHANGE UP (ref 0–6)
ESTIMATED AVERAGE GLUCOSE: 131 MG/DL — HIGH (ref 68–114)
GAS PNL BLDV: 140 MMOL/L — SIGNIFICANT CHANGE UP (ref 136–145)
GAS PNL BLDV: SIGNIFICANT CHANGE UP
GLUCOSE BLDC GLUCOMTR-MCNC: 140 MG/DL — HIGH (ref 70–99)
GLUCOSE BLDC GLUCOMTR-MCNC: 142 MG/DL — HIGH (ref 70–99)
GLUCOSE BLDC GLUCOMTR-MCNC: 172 MG/DL — HIGH (ref 70–99)
GLUCOSE BLDV-MCNC: 159 MG/DL — HIGH (ref 70–99)
GLUCOSE SERPL-MCNC: 169 MG/DL — HIGH (ref 70–99)
HCO3 BLDV-SCNC: 26 MMOL/L — SIGNIFICANT CHANGE UP (ref 22–29)
HCT VFR BLD CALC: 30 % — LOW (ref 34.5–45)
HCT VFR BLDA CALC: 27 % — LOW (ref 34.5–46.5)
HDLC SERPL-MCNC: 20 MG/DL — LOW
HGB BLD CALC-MCNC: 9.1 G/DL — LOW (ref 11.7–16.1)
HGB BLD-MCNC: 8.8 G/DL — LOW (ref 11.5–15.5)
IMM GRANULOCYTES NFR BLD AUTO: 3.6 % — HIGH (ref 0–0.9)
INR BLD: 1.36 RATIO — HIGH (ref 0.85–1.18)
LACTATE BLDV-MCNC: 2 MMOL/L — SIGNIFICANT CHANGE UP (ref 0.5–2)
LIPID PNL WITH DIRECT LDL SERPL: 63 MG/DL — SIGNIFICANT CHANGE UP
LYMPHOCYTES # BLD AUTO: 1.88 K/UL — SIGNIFICANT CHANGE UP (ref 1–3.3)
LYMPHOCYTES # BLD AUTO: 28.2 % — SIGNIFICANT CHANGE UP (ref 13–44)
MAGNESIUM SERPL-MCNC: 1.8 MG/DL — SIGNIFICANT CHANGE UP (ref 1.6–2.6)
MCHC RBC-ENTMCNC: 25.2 PG — LOW (ref 27–34)
MCHC RBC-ENTMCNC: 29.3 GM/DL — LOW (ref 32–36)
MCV RBC AUTO: 86 FL — SIGNIFICANT CHANGE UP (ref 80–100)
MONOCYTES # BLD AUTO: 0.6 K/UL — SIGNIFICANT CHANGE UP (ref 0–0.9)
MONOCYTES NFR BLD AUTO: 9 % — SIGNIFICANT CHANGE UP (ref 2–14)
NEUTROPHILS # BLD AUTO: 3.91 K/UL — SIGNIFICANT CHANGE UP (ref 1.8–7.4)
NEUTROPHILS NFR BLD AUTO: 58.6 % — SIGNIFICANT CHANGE UP (ref 43–77)
NON HDL CHOLESTEROL: 110 MG/DL — SIGNIFICANT CHANGE UP
NRBC # BLD: 0 /100 WBCS — SIGNIFICANT CHANGE UP (ref 0–0)
PCO2 BLDV: 55 MMHG — HIGH (ref 39–42)
PH BLDV: 7.29 — LOW (ref 7.32–7.43)
PHOSPHATE SERPL-MCNC: 4.3 MG/DL — SIGNIFICANT CHANGE UP (ref 2.5–4.5)
PLATELET # BLD AUTO: 114 K/UL — LOW (ref 150–400)
PO2 BLDV: 34 MMHG — SIGNIFICANT CHANGE UP (ref 25–45)
POTASSIUM BLDV-SCNC: 2.4 MMOL/L — CRITICAL LOW (ref 3.5–5.1)
POTASSIUM SERPL-MCNC: 2.5 MMOL/L — CRITICAL LOW (ref 3.5–5.3)
POTASSIUM SERPL-SCNC: 2.5 MMOL/L — CRITICAL LOW (ref 3.5–5.3)
PROT SERPL-MCNC: 6.9 G/DL — SIGNIFICANT CHANGE UP (ref 6–8.3)
PROTHROM AB SERPL-ACNC: 14.2 SEC — HIGH (ref 9.5–13)
RBC # BLD: 3.49 M/UL — LOW (ref 3.8–5.2)
RBC # FLD: 19.2 % — HIGH (ref 10.3–14.5)
SAO2 % BLDV: 54.1 % — LOW (ref 67–88)
SODIUM SERPL-SCNC: 140 MMOL/L — SIGNIFICANT CHANGE UP (ref 135–145)
SPECIMEN SOURCE: SIGNIFICANT CHANGE UP
TRIGL SERPL-MCNC: 295 MG/DL — HIGH
WBC # BLD: 6.67 K/UL — SIGNIFICANT CHANGE UP (ref 3.8–10.5)
WBC # FLD AUTO: 6.67 K/UL — SIGNIFICANT CHANGE UP (ref 3.8–10.5)

## 2024-04-04 PROCEDURE — 49465 FLUORO EXAM OF G/COLON TUBE: CPT

## 2024-04-04 PROCEDURE — 99232 SBSQ HOSP IP/OBS MODERATE 35: CPT

## 2024-04-04 PROCEDURE — 99233 SBSQ HOSP IP/OBS HIGH 50: CPT | Mod: FS

## 2024-04-04 RX ORDER — CHLORHEXIDINE GLUCONATE 213 G/1000ML
1 SOLUTION TOPICAL
Refills: 0 | Status: DISCONTINUED | OUTPATIENT
Start: 2024-04-04 | End: 2024-05-01

## 2024-04-04 RX ORDER — ACETAMINOPHEN 500 MG
1000 TABLET ORAL EVERY 6 HOURS
Refills: 0 | Status: DISCONTINUED | OUTPATIENT
Start: 2024-04-04 | End: 2024-04-10

## 2024-04-04 RX ORDER — POTASSIUM CHLORIDE 20 MEQ
10 PACKET (EA) ORAL
Refills: 0 | Status: COMPLETED | OUTPATIENT
Start: 2024-04-04 | End: 2024-04-04

## 2024-04-04 RX ORDER — SODIUM CHLORIDE 9 MG/ML
1000 INJECTION, SOLUTION INTRAVENOUS
Refills: 0 | Status: DISCONTINUED | OUTPATIENT
Start: 2024-04-04 | End: 2024-04-17

## 2024-04-04 RX ORDER — POTASSIUM CHLORIDE 20 MEQ
40 PACKET (EA) ORAL ONCE
Refills: 0 | Status: COMPLETED | OUTPATIENT
Start: 2024-04-04 | End: 2024-04-04

## 2024-04-04 RX ADMIN — Medication 1: at 06:15

## 2024-04-04 RX ADMIN — Medication 2 DROP(S): at 00:23

## 2024-04-04 RX ADMIN — Medication 100 MILLIEQUIVALENT(S): at 10:24

## 2024-04-04 RX ADMIN — SIMETHICONE 80 MILLIGRAM(S): 80 TABLET, CHEWABLE ORAL at 17:00

## 2024-04-04 RX ADMIN — LIDOCAINE 1 PATCH: 4 CREAM TOPICAL at 22:20

## 2024-04-04 RX ADMIN — Medication 2 DROP(S): at 06:16

## 2024-04-04 RX ADMIN — CHLORHEXIDINE GLUCONATE 1 APPLICATION(S): 213 SOLUTION TOPICAL at 20:38

## 2024-04-04 RX ADMIN — AMLODIPINE BESYLATE 10 MILLIGRAM(S): 2.5 TABLET ORAL at 12:58

## 2024-04-04 RX ADMIN — Medication 2 DROP(S): at 00:22

## 2024-04-04 RX ADMIN — Medication 2 DROP(S): at 12:13

## 2024-04-04 RX ADMIN — Medication 2 DROP(S): at 17:01

## 2024-04-04 RX ADMIN — Medication 40 MILLIEQUIVALENT(S): at 17:16

## 2024-04-04 RX ADMIN — LIDOCAINE 1 PATCH: 4 CREAM TOPICAL at 22:19

## 2024-04-04 RX ADMIN — MUPIROCIN 1 APPLICATION(S): 20 OINTMENT TOPICAL at 12:12

## 2024-04-04 RX ADMIN — QUETIAPINE FUMARATE 12.5 MILLIGRAM(S): 200 TABLET, FILM COATED ORAL at 14:41

## 2024-04-04 RX ADMIN — SODIUM CHLORIDE 50 MILLILITER(S): 9 INJECTION, SOLUTION INTRAVENOUS at 15:38

## 2024-04-04 RX ADMIN — LIDOCAINE 1 PATCH: 4 CREAM TOPICAL at 07:37

## 2024-04-04 RX ADMIN — FIDAXOMICIN 200 MILLIGRAM(S): 200 GRANULE, FOR SUSPENSION ORAL at 14:19

## 2024-04-04 RX ADMIN — FIDAXOMICIN 200 MILLIGRAM(S): 200 GRANULE, FOR SUSPENSION ORAL at 17:01

## 2024-04-04 RX ADMIN — Medication 1000 MILLIGRAM(S): at 20:36

## 2024-04-04 RX ADMIN — PANTOPRAZOLE SODIUM 40 MILLIGRAM(S): 20 TABLET, DELAYED RELEASE ORAL at 06:15

## 2024-04-04 RX ADMIN — CHLORHEXIDINE GLUCONATE 15 MILLILITER(S): 213 SOLUTION TOPICAL at 17:01

## 2024-04-04 RX ADMIN — ESCITALOPRAM OXALATE 10 MILLIGRAM(S): 10 TABLET, FILM COATED ORAL at 17:16

## 2024-04-04 RX ADMIN — Medication 5 MILLILITER(S): at 06:15

## 2024-04-04 RX ADMIN — GABAPENTIN 100 MILLIGRAM(S): 400 CAPSULE ORAL at 20:37

## 2024-04-04 RX ADMIN — Medication 1: at 00:22

## 2024-04-04 RX ADMIN — Medication 1000 MILLIGRAM(S): at 13:27

## 2024-04-04 RX ADMIN — Medication 2 DROP(S): at 12:31

## 2024-04-04 RX ADMIN — Medication 5 MILLILITER(S): at 12:13

## 2024-04-04 RX ADMIN — Medication 3 MILLILITER(S): at 21:23

## 2024-04-04 RX ADMIN — Medication 5 MILLILITER(S): at 00:22

## 2024-04-04 RX ADMIN — Medication 1000 MILLIGRAM(S): at 21:30

## 2024-04-04 RX ADMIN — Medication 1000 MILLIGRAM(S): at 12:57

## 2024-04-04 RX ADMIN — Medication 3 MILLIGRAM(S): at 22:23

## 2024-04-04 RX ADMIN — Medication 100 MILLIEQUIVALENT(S): at 12:13

## 2024-04-04 RX ADMIN — Medication 1 APPLICATION(S): at 12:13

## 2024-04-04 RX ADMIN — Medication 5 MILLILITER(S): at 17:00

## 2024-04-04 RX ADMIN — Medication 2 DROP(S): at 22:21

## 2024-04-04 RX ADMIN — Medication 87.5 MICROGRAM(S): at 04:15

## 2024-04-04 RX ADMIN — CHLORHEXIDINE GLUCONATE 15 MILLILITER(S): 213 SOLUTION TOPICAL at 06:16

## 2024-04-04 RX ADMIN — Medication 100 MILLIEQUIVALENT(S): at 08:55

## 2024-04-04 NOTE — DIETITIAN INITIAL EVALUATION ADULT - ADD RECOMMEND
1) Will continue to monitor weight, labs, skin, GI status and diet advancement 2) Continue vitamin C and Multivitamin as able per team to aid in wound healing  1) Will continue to monitor weight, labs, skin, GI status and diet advancement 2) Continue vitamin C and Multivitamin as able per team to aid in wound healing 3) When able, consider Alfred twice daily to aid in wound healing

## 2024-04-04 NOTE — PROGRESS NOTE ADULT - NS ATTEND AMEND GEN_ALL_CORE FT
74 year old female with diabetes, hypertension, hyperlipidemia, hypothyroidism, RA on Prednisone, fibromyalgia, cerebral aneurysm s/p repair, chronic hypercapnic respiratory failure s/p trach (vent dependent) and PEG, recurrent C.diff infection (follows with Dr. Newman) who presented with dislodged PEG.       #Dislodged PEG  She is s/p bedside replacement by IR and appears to be in appropriate position. Some leaking of TF, planned for study today with IR.   Nutrition evaluation completed.    #C.Diff  Will plan to continue Dificid as per ID. Appreciate input with this case    #Sacral wound: Pending wound care evaluation    #Positive UA: No localizing symptoms, leukocytosis. Holding antibiotics for now, unless symptoms arise as not to exacerbate her C.Diff.    Dispo planning underway. Pending wound care eval and IR eval for PEG. Otherwise will plan for DC tomorrow if all stable. Discussed with daughter over the phone

## 2024-04-04 NOTE — CHART NOTE - NSCHARTNOTEFT_GEN_A_CORE
G tube adjustment post-x-ray reviewed with IR attending.    G tube is in appropriate position in the stomach, confirmed with bedside contrast injection.    OK to use.

## 2024-04-04 NOTE — DIETITIAN INITIAL EVALUATION ADULT - ORAL INTAKE PTA/DIET HISTORY
visited pt at bedside this morning, home health aid at bedside. Per aid, new to patient. Spoke with RN, confirms pt currently NPO. PMHx of DM2 noted. Per outpatient medications, insulin regimen noted.    Most recently seen by RD 1/5/24 with the following diet: Casie Sprout [employer] glucose support 1.2 60ml/hr x 20 hours providing 1440kcal and 77g protein. Pt vegan, but per previous discussion with daughter, Alfred is okay. See nutrition note 11/2/24. visited pt at bedside this morning, home health aid at bedside. Per aid, new to patient. Spoke with RN, confirms pt currently NPO. PMHx of DM2 noted. Per outpatient medications, insulin regimen noted.    Food allergies noted in chart: pecan, hazelnut, walnut, pineapple   Most recently seen by RD 1/5/24 with the following diet: Meal Mantra glucose support 1.2 60ml/hr x 20 hours providing 1440kcal and 77g protein. Pt vegan, but per previous discussion with daughter, Alfred is okay. See nutrition note 11/2/24.

## 2024-04-04 NOTE — CHART NOTE - NSCHARTNOTEFT_GEN_A_CORE
Evaluated g tube at bedside. G tube with leaking mucus around tube. Tube was loose with disc back several centimeters. Tightened disc and placed new dressing. Will have contrast injected in tube at bedside with x ray to clear before use. 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

## 2024-04-04 NOTE — PROGRESS NOTE ADULT - PROBLEM SELECTOR PLAN 1
PEG tube dislodgement on admission   - S/p CT abd/pelvis showing dislodgement of G tube with balloon in the anterior abdominal wall with air filled track to stomach.  - initially GI and Surgery consulted   - S/p bedside exchange by IR on 4/3- # 20 Fr Kangaroo EnFit placed   - GTube XR study confirmed peg in stomach   - Cleared by IR for use of meds and Tube Feeds  - Resume TF and monitor for tolerance  - Nutrition eval pending PEG tube dislodgement on admission   - S/p CT abd/pelvis showing dislodgement of G tube with balloon in the anterior abdominal wall with air filled track to stomach.  - initially GI and Surgery consulted   - S/p bedside exchange by IR on 4/3- # 20 Fr Kangaroo EnFit placed   - 4/4:PEG leaking.  Feeds held.  IR adjusted PEG at bedside. G-tube study pending to exclude extravasation. Patient remains NPO except for meds, on IVF, LR at 50ml/hr.

## 2024-04-04 NOTE — PROGRESS NOTE ADULT - ASSESSMENT
ASSESSMENT:   74F w PMH T2DM, HTN, HLD, anemia, hypothyroidism, RA, fibromyalgia, remote cerebral aneurysm repair, acute hypercapnic respiratory failure now with tracheostomy, gastrostomy tube (initially placed 2022 at OSH), and bedbound (trach change 8/18, exchange 8/14/2023), c. diff currently being treated, presents with dislodged PEG tube for last 3 days. s/p gastrostomy tube replacement w IR bedside with demonstration of tube tip within gastric lumen.     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     RECOMMENDATIONS:   - f/u IR reassessment of gastrostomy tube  - surgery to follow    Plan discussed with surgical attending, Dr. Estrada    ACS/Trauma Surgery [id number]

## 2024-04-04 NOTE — DIETITIAN INITIAL EVALUATION ADULT - REASON FOR ADMISSION
per chart: 73 yo F PMH T2DM on insulin, HTN, HLD, hypothyroidism, RA on Prednisone, Fibromyalgia, cerebral aneurysm s/p repair, chronic hypercapnic respiratory failure s/p trach (vent dependent) and Chronic PEG 2022 , recurrent C.diff infection (follows with Dr. Newman) , bedbound who presented from home with complaints of possible G tube dislodgement and redness around the site.

## 2024-04-04 NOTE — CHART NOTE - NSCHARTNOTEFT_GEN_A_CORE
74yoF h/o T2DM, HTN, HLD, anemia, hypothyroidism, RA, fibromyalgia, remote cerebral aneurysm repair, acute hypercapnic respiratory failure with tracheostomy, PEG tube (initially placed 2022), and bedbound, recurrent C diff presented for PEG tube dislodgment. S/p exchange at bedside 4/3 for 20 Fr Kangaroo EnFit.      -contacted by primary team for ongoing leaking from gtube site  -plan for gtube eval today in IR  -please place procedure order under NP Ruben (free text)   -Please keep patient NPO for procedure

## 2024-04-04 NOTE — DIETITIAN INITIAL EVALUATION ADULT - ENTERAL
Defer EN initiation to team. When able, recommend: Casie Oxonica Glucose Support 1.2: 65ml/hr x 20 hours to provide a total volume of 1300ml, 1560kcal (28.6kcal/kg), 83g protein (1.5g/kg) and 988ml free water. defer free water flush to team.

## 2024-04-04 NOTE — PROGRESS NOTE ADULT - PROBLEM SELECTOR PROBLEM 4
Sacral wound no abrasion/no back pain/no deformity/no fever/no numbness/no stiffness/no weakness/no bruising/no difficulty bearing weight

## 2024-04-04 NOTE — DIETITIAN INITIAL EVALUATION ADULT - PERTINENT MEDS FT
MEDICATIONS  (STANDING):  amLODIPine   Tablet 10 milliGRAM(s) Oral <User Schedule>  artificial tears (preservative free) Ophthalmic Solution 2 Drop(s) Both EYES every 6 hours  ascorbic acid 500 milliGRAM(s) Oral daily  Biotene Dry Mouth Oral Rinse 5 milliLiter(s) Swish and Spit every 6 hours  chlorhexidine 0.12% Liquid 15 milliLiter(s) Oral Mucosa every 12 hours  chlorhexidine 2% Cloths 1 Application(s) Topical daily  dextrose 5% + lactated ringers. 1000 milliLiter(s) (75 mL/Hr) IV Continuous <Continuous>  escitalopram 10 milliGRAM(s) Oral <User Schedule>  fentaNYL   Patch  25 MICROgram(s)/Hr 1 Patch Transdermal every 72 hours  fidaxomicin 200 milliGRAM(s) Oral two times a day  gabapentin Solution 100 milliGRAM(s) Oral <User Schedule>  insulin glargine Injectable (LANTUS) 7 Unit(s) SubCutaneous <User Schedule>  insulin lispro (ADMELOG) corrective regimen sliding scale   SubCutaneous every 6 hours  levothyroxine Injectable 87.5 MICROGram(s) IV Push <User Schedule>  lidocaine   4% Patch 1 Patch Transdermal at bedtime  lidocaine   4% Patch 1 Patch Transdermal at bedtime  melatonin Liquid 3 milliGRAM(s) Oral at bedtime  multivitamin 1 Tablet(s) Oral daily  mupirocin 2% Nasal 1 Application(s) Both Nostrils once  pantoprazole  Injectable 40 milliGRAM(s) IV Push <User Schedule>  predniSONE  Solution 5 milliGRAM(s) Oral <User Schedule>  QUEtiapine 12.5 milliGRAM(s) Oral <User Schedule>  simethicone 80 milliGRAM(s) Chew every 12 hours  tobramycin 0.3% Ophthalmic Solution 2 Drop(s) Both EYES every 6 hours    MEDICATIONS  (PRN):  albuterol/ipratropium for Nebulization 3 milliLiter(s) Nebulizer every 6 hours PRN Shortness of Breath and/or Wheezing  petrolatum Ophthalmic Ointment 1 Application(s) Both EYES every 6 hours PRN eye dryness  sodium chloride 0.65% Nasal 1 Spray(s) Both Nostrils every 12 hours PRN Congestion

## 2024-04-04 NOTE — PROGRESS NOTE ADULT - SUBJECTIVE AND OBJECTIVE BOX
Surgery Daily Progress Note    Subjective:   Patient seen at bedside this AM.    24h Events:   - s/p IR bedside replacement.    Objective:  Vital Signs  T(C): 36.8 (04-04 @ 05:00), Max: 37.1 (04-03 @ 12:00)  HR: 87 (04-04 @ 10:37) (70 - 112)  BP: 132/54 (04-04 @ 05:00) (123/50 - 152/67)  RR: 22 (04-04 @ 10:37) (12 - 24)  SpO2: 98% (04-04 @ 10:37) (98% - 100%)  04-03-24 @ 07:01  -  04-04-24 @ 07:00  --------------------------------------------------------  IN:  Total IN: 0 mL    OUT:    Incontinent per Collection Bag (mL): 300 mL  Total OUT: 300 mL    Total NET: -300 mL    Physical Exam:  General: No acute distress, resting comfortably in bed  HEENT: Mechanical ventilation via tracheostomy  Abdomen: soft, nondistended, nontender, gastrostomy tube site with mild surrounding erythema. Gastric contents flowing out around the tube.     Labs:                        8.8    6.67  )-----------( 114      ( 04 Apr 2024 07:40 )             30.0   04-04    140  |  102  |  10  ----------------------------<  169<H>  2.5<LL>   |  25  |  0.33<L>    Ca    9.1      04 Apr 2024 07:09  Phos  4.3     04-04  Mg     1.8     04-04    TPro  6.9  /  Alb  3.3  /  TBili  0.5  /  DBili  x   /  AST  23  /  ALT  20  /  AlkPhos  43  04-04    CAPILLARY BLOOD GLUCOSE      POCT Blood Glucose.: 172 mg/dL (04 Apr 2024 05:37)  POCT Blood Glucose.: 173 mg/dL (03 Apr 2024 23:56)  POCT Blood Glucose.: 135 mg/dL (03 Apr 2024 17:48)  POCT Blood Glucose.: 131 mg/dL (03 Apr 2024 11:22)      Medications:   MEDICATIONS  (STANDING):  amLODIPine   Tablet 10 milliGRAM(s) Oral <User Schedule>  artificial tears (preservative free) Ophthalmic Solution 2 Drop(s) Both EYES every 6 hours  ascorbic acid 500 milliGRAM(s) Oral daily  Biotene Dry Mouth Oral Rinse 5 milliLiter(s) Swish and Spit every 6 hours  chlorhexidine 0.12% Liquid 15 milliLiter(s) Oral Mucosa every 12 hours  chlorhexidine 2% Cloths 1 Application(s) Topical daily  escitalopram 10 milliGRAM(s) Oral <User Schedule>  fentaNYL   Patch  25 MICROgram(s)/Hr 1 Patch Transdermal every 72 hours  fidaxomicin 200 milliGRAM(s) Oral two times a day  gabapentin Solution 100 milliGRAM(s) Oral <User Schedule>  insulin glargine Injectable (LANTUS) 7 Unit(s) SubCutaneous <User Schedule>  insulin lispro (ADMELOG) corrective regimen sliding scale   SubCutaneous every 6 hours  levothyroxine Injectable 87.5 MICROGram(s) IV Push <User Schedule>  lidocaine   4% Patch 1 Patch Transdermal at bedtime  lidocaine   4% Patch 1 Patch Transdermal at bedtime  melatonin Liquid 3 milliGRAM(s) Oral at bedtime  multivitamin 1 Tablet(s) Oral daily  mupirocin 2% Nasal 1 Application(s) Both Nostrils once  pantoprazole  Injectable 40 milliGRAM(s) IV Push <User Schedule>  potassium chloride  10 mEq/100 mL IVPB 10 milliEquivalent(s) IV Intermittent every 1 hour  predniSONE  Solution 5 milliGRAM(s) Oral <User Schedule>  QUEtiapine 12.5 milliGRAM(s) Oral <User Schedule>  simethicone 80 milliGRAM(s) Chew every 12 hours  tobramycin 0.3% Ophthalmic Solution 2 Drop(s) Both EYES every 6 hours    MEDICATIONS  (PRN):  albuterol/ipratropium for Nebulization 3 milliLiter(s) Nebulizer every 6 hours PRN Shortness of Breath and/or Wheezing  petrolatum Ophthalmic Ointment 1 Application(s) Both EYES every 6 hours PRN eye dryness  sodium chloride 0.65% Nasal 1 Spray(s) Both Nostrils every 12 hours PRN Congestion      Imaging:  < from: Xray Feeding Tube Check (04.03.24 @ 14:17) >  IMPRESSION: Gastrostomy tube within the stomach. No radiographic evidence   for extravasation.    --- End of Report ---    < end of copied text >

## 2024-04-04 NOTE — DIETITIAN INITIAL EVALUATION ADULT - PHYSCIAL ASSESSMENT
Per previous RD note 24:  10/29/24:  reports pt UBW 90 pounds prior to illness (~41kg)  Daily Weight in k.9 ()  .  Dosing weight this admission 24: 54.5kg  .  RD will continue to monitor trends.

## 2024-04-04 NOTE — PROGRESS NOTE ADULT - ASSESSMENT
75 yo F PMH T2DM on insulin, HTN, HLD, hypothyroidism, RA on Prednisone, Fibromyalgia, cerebral aneurysm s/p repair, chronic hypercapnic respiratory failure s/p trach (vent dependent) and Chronic PEG 2022 , recurrent C.diff infection (follows with Dr. Newman) , bedbound who presented from home with complaints of possible G tube dislodgement and redness around the site. Had CT Abdomen/Pelvis done showing dislodgement of G tube with balloon in the anterior abdominal wall with air filled track to stomach. Admitted to MICU for ventilator management and given vancomycin/ meropenum empirically for treatment of cellulitis. Per family patient has had Known c diff infection for past 2-3 weeks. Was being treated by Dr. Zion Newman, and currently receiving home Fidaxomicin.  Also report recent UTI--not currently being treated. Patient was consulted by ID, GI, Surgery and IR in MICU. Transferred to RCU 4/3.    4/3: Transferred from MICU, HD Stable, and stable on Full Vent Support settings. Received bedside peg exchange by IR today. G tube XR study confirmed peg in stomach. IR cleared for use of tube feeds/meds. ID restarted patient on home Fidaxomicin oral bid x 5 days. Wound care and Nutrition to f/u in am. Will likely proceed with discharge planning back to home w/ home services to be reinstated.      75 yo F PMH T2DM on insulin, HTN, HLD, hypothyroidism, RA on Prednisone, Fibromyalgia, cerebral aneurysm s/p repair, chronic hypercapnic respiratory failure s/p trach (vent dependent) and Chronic PEG 2022 , recurrent C.diff infection (follows with Dr. Newman) , bedbound who presented from home with complaints of possible G tube dislodgement and redness around the site. Had CT Abdomen/Pelvis done showing dislodgement of G tube with balloon in the anterior abdominal wall with air filled track to stomach. Admitted to MICU for ventilator management and given vancomycin/ meropenum empirically for treatment of cellulitis. Per family patient has had Known c diff infection for past 2-3 weeks. Was being treated by Dr. Zion Newman, and currently receiving home Fidaxomicin.  Also report recent UTI--not currently being treated. Patient was consulted by ID, GI, Surgery and IR in MICU. Transferred to RCU 4/3.    4/3: Transferred from MICU, HD Stable, and stable on Full Vent Support settings. Received bedside peg exchange by IR today. G tube XR study confirmed peg in stomach. IR cleared for use of tube feeds/meds. ID restarted patient on home Fidaxomicin oral bid x 5 days. Wound care and Nutrition to f/u in am. Will likely proceed with discharge planning back to home w/ home services to be reinstated.   4/4: PEG leaked yesterday following replacement.  Tube feeds placed on hold.  IR attempted reposition of PEG at bedside followed G-tube study.  Awaiting confirmation whether tube can be used for feeds.  Patient also with fever 101.4F this afternoon.  ID made aware, will obtain blood and sputum cultures and continue to monitor off antibiotics.

## 2024-04-04 NOTE — PROGRESS NOTE ADULT - PROBLEM SELECTOR PLAN 2
Being treated for C diff as outpatient with follow up with Infectious disease, Dr Newman   - Will resume on Home fidaxomicin for 5 more days (total 10 day course)  - Infectious diease consult appreciated

## 2024-04-04 NOTE — PROGRESS NOTE ADULT - SUBJECTIVE AND OBJECTIVE BOX
Patient is a 74y old  Female who presents with a chief complaint of Dislodged G Tube (03 Apr 2024 14:35)      Interval Events:    REVIEW OF SYSTEMS:  [ ] Positive  [ ] All other systems negative  [ ] Unable to assess ROS because ________    Vital Signs Last 24 Hrs  T(C): 36.8 (04-04-24 @ 05:00), Max: 37.2 (04-03-24 @ 08:00)  T(F): 98.2 (04-04-24 @ 05:00), Max: 99 (04-03-24 @ 08:00)  HR: 77 (04-04-24 @ 05:00) (62 - 112)  BP: 132/54 (04-04-24 @ 05:00) (102/51 - 152/69)  RR: 16 (04-04-24 @ 05:00) (12 - 24)  SpO2: 100% (04-04-24 @ 05:00) (100% - 100%)    PHYSICAL EXAM:  HEENT:   [ ]Tracheostomy:  [ ]Pupils equal  [ ]No oral lesions  [ ]Abnormal    SKIN  [ ]No Rash  [ ] Abnormal  [ ] pressure    CARDIAC  [ ]Regular  [ ]Abnormal    PULMONARY  [ ]Bilateral Clear Breath Sounds  [ ]Normal Excursion  [ ]Abnormal    GI  [ ]PEG      [ ] +BS		              [ ]Soft, nondistended, nontender	  [ ]Abnormal    MUSCULOSKELETAL                                   [ ]Bedbound                 [ ]Abnormal    [ ]Ambulatory/OOB to chair                           EXTREMITIES                                         [ ]Normal  [ ]Edema                           NEUROLOGIC  [ ] Normal, non focal  [ ] Focal findings:    PSYCHIATRIC  [ ]Alert and appropriate  [ ] Sedated	 [ ]Agitated    :  Mcbride: [ ] Yes, if yes: Date of Placement:                   [  ] No    LINES: Central Lines [ ] Yes, if yes: Date of Placement                                     [  ] No    HOSPITAL MEDICATIONS:  MEDICATIONS  (STANDING):  amLODIPine   Tablet 10 milliGRAM(s) Oral <User Schedule>  artificial tears (preservative free) Ophthalmic Solution 2 Drop(s) Both EYES every 6 hours  ascorbic acid 500 milliGRAM(s) Oral daily  Biotene Dry Mouth Oral Rinse 5 milliLiter(s) Swish and Spit every 6 hours  chlorhexidine 0.12% Liquid 15 milliLiter(s) Oral Mucosa every 12 hours  chlorhexidine 2% Cloths 1 Application(s) Topical daily  dextrose 5% + lactated ringers. 1000 milliLiter(s) (75 mL/Hr) IV Continuous <Continuous>  escitalopram 10 milliGRAM(s) Oral <User Schedule>  fentaNYL   Patch  25 MICROgram(s)/Hr 1 Patch Transdermal every 72 hours  fidaxomicin 200 milliGRAM(s) Oral two times a day  gabapentin Solution 100 milliGRAM(s) Oral <User Schedule>  insulin glargine Injectable (LANTUS) 7 Unit(s) SubCutaneous <User Schedule>  insulin lispro (ADMELOG) corrective regimen sliding scale   SubCutaneous every 6 hours  levothyroxine Injectable 87.5 MICROGram(s) IV Push <User Schedule>  lidocaine   4% Patch 1 Patch Transdermal at bedtime  lidocaine   4% Patch 1 Patch Transdermal at bedtime  melatonin Liquid 3 milliGRAM(s) Oral at bedtime  multivitamin 1 Tablet(s) Oral daily  mupirocin 2% Nasal 1 Application(s) Both Nostrils once  pantoprazole  Injectable 40 milliGRAM(s) IV Push <User Schedule>  predniSONE  Solution 5 milliGRAM(s) Oral <User Schedule>  QUEtiapine 12.5 milliGRAM(s) Oral <User Schedule>  simethicone 80 milliGRAM(s) Chew every 12 hours  tobramycin 0.3% Ophthalmic Solution 2 Drop(s) Both EYES every 6 hours    MEDICATIONS  (PRN):  albuterol/ipratropium for Nebulization 3 milliLiter(s) Nebulizer every 6 hours PRN Shortness of Breath and/or Wheezing  petrolatum Ophthalmic Ointment 1 Application(s) Both EYES every 6 hours PRN eye dryness  sodium chloride 0.65% Nasal 1 Spray(s) Both Nostrils every 12 hours PRN Congestion      LABS:                        9.5    8.67  )-----------( 125      ( 02 Apr 2024 23:40 )             31.7     04-02    135  |  99  |  14  ----------------------------<  142<H>  3.6   |  25  |  <0.30<L>    Ca    9.5      02 Apr 2024 23:40  Phos  3.1     04-02  Mg     1.9     04-02    TPro  7.1  /  Alb  3.5  /  TBili  0.6  /  DBili  x   /  AST  17  /  ALT  20  /  AlkPhos  49  04-02    PT/INR - ( 02 Apr 2024 23:40 )   PT: 13.2 sec;   INR: 1.27 ratio         PTT - ( 02 Apr 2024 23:40 )  PTT:28.3 sec  Urinalysis Basic - ( 03 Apr 2024 00:22 )    Color: Yellow / Appearance: Clear / SG: >1.030 / pH: x  Gluc: x / Ketone: Trace mg/dL  / Bili: Negative / Urobili: 0.2 mg/dL   Blood: x / Protein: Trace mg/dL / Nitrite: Positive   Leuk Esterase: Small / RBC: 3 /HPF / WBC 11 /HPF   Sq Epi: x / Non Sq Epi: 1 /HPF / Bacteria: Many /HPF        Venous Blood Gas:  04-02 @ 23:28  7.42/44/19/28/25.2  VBG Lactate: 3.1    CAPILLARY BLOOD GLUCOSE    MICROBIOLOGY:     RADIOLOGY:  [ ] Reviewed and interpreted by me    Mode: AC/ CMV (Assist Control/ Continuous Mandatory Ventilation)  RR (machine): 12  TV (machine): 300  FiO2: 30  PEEP: 5  ITime: 0.72  MAP: 9  PIP: 21   Patient is a 74y old  Female who presents with a chief complaint of Dislodged G Tube (03 Apr 2024 14:35)      Interval Events: PEG tube leaking from stoma.  Feeds discontinued, IR team involved.    REVIEW OF SYSTEMS:  [X] Positive:  Feels unwell but does not specify  [ ] All other systems negative  [ ] Unable to assess ROS because _______    Vital Signs Last 24 Hrs  T(C): 36.8 (04-04-24 @ 05:00), Max: 37.2 (04-03-24 @ 08:00)  T(F): 98.2 (04-04-24 @ 05:00), Max: 99 (04-03-24 @ 08:00)  HR: 77 (04-04-24 @ 05:00) (62 - 112)  BP: 132/54 (04-04-24 @ 05:00) (102/51 - 152/69)  RR: 16 (04-04-24 @ 05:00) (12 - 24)  SpO2: 100% (04-04-24 @ 05:00) (100% - 100%)    PHYSICAL EXAM:  HEENT:   [X] Tracheostomy:  #8 distal XLT Cuffed Shiley  [X] PERRL B/L  [X] No oral lesions  [ ] Abnormal    SKIN  [ ] No Rash  [ ] Abnormal  [X] pressure: B/L buttocks/sacral deep tissue injury     CARDIAC  [X] Regular  [ ]Abnormal    PULMONARY  [X] Bilateral Clear Breath Sounds  [ ] Normal Excursion  [ ] Abnormal    GI  [X] PEG      [X] +BS		              [X] Soft, nondistended, nontender	  [ ] Abnormal    MUSCULOSKELETAL                                   [X] Bedbound                 [ ] Abnormal    [ ] Ambulatory/OOB to chair                           EXTREMITIES                                         [X] Normal  [ ]Edema                           NEUROLOGIC  [ ] Normal, non focal  [X] Focal findings:  Alert and Oriented x3; responds to questions by mouthing words; +gag reflex/cough; no facial asymmetry observed; moves all distal limbs upon request; B/L plantar flexion    PSYCHIATRIC  [X] Alert and appropriate  [ ] Sedated	 [ ]Agitated    :  Mcbride: [ ] Yes, if yes: Date of Placement:                   [X ] No    LINES: Central Lines [ ] Yes, if yes: Date of Placement                                     [X ] No    HOSPITAL MEDICATIONS:  MEDICATIONS  (STANDING):  amLODIPine   Tablet 10 milliGRAM(s) Oral <User Schedule>  artificial tears (preservative free) Ophthalmic Solution 2 Drop(s) Both EYES every 6 hours  ascorbic acid 500 milliGRAM(s) Oral daily  Biotene Dry Mouth Oral Rinse 5 milliLiter(s) Swish and Spit every 6 hours  chlorhexidine 0.12% Liquid 15 milliLiter(s) Oral Mucosa every 12 hours  chlorhexidine 2% Cloths 1 Application(s) Topical daily  dextrose 5% + lactated ringers. 1000 milliLiter(s) (75 mL/Hr) IV Continuous <Continuous>  escitalopram 10 milliGRAM(s) Oral <User Schedule>  fentaNYL   Patch  25 MICROgram(s)/Hr 1 Patch Transdermal every 72 hours  fidaxomicin 200 milliGRAM(s) Oral two times a day  gabapentin Solution 100 milliGRAM(s) Oral <User Schedule>  insulin glargine Injectable (LANTUS) 7 Unit(s) SubCutaneous <User Schedule>  insulin lispro (ADMELOG) corrective regimen sliding scale   SubCutaneous every 6 hours  levothyroxine Injectable 87.5 MICROGram(s) IV Push <User Schedule>  lidocaine   4% Patch 1 Patch Transdermal at bedtime  lidocaine   4% Patch 1 Patch Transdermal at bedtime  melatonin Liquid 3 milliGRAM(s) Oral at bedtime  multivitamin 1 Tablet(s) Oral daily  mupirocin 2% Nasal 1 Application(s) Both Nostrils once  pantoprazole  Injectable 40 milliGRAM(s) IV Push <User Schedule>  predniSONE  Solution 5 milliGRAM(s) Oral <User Schedule>  QUEtiapine 12.5 milliGRAM(s) Oral <User Schedule>  simethicone 80 milliGRAM(s) Chew every 12 hours  tobramycin 0.3% Ophthalmic Solution 2 Drop(s) Both EYES every 6 hours    MEDICATIONS  (PRN):  albuterol/ipratropium for Nebulization 3 milliLiter(s) Nebulizer every 6 hours PRN Shortness of Breath and/or Wheezing  petrolatum Ophthalmic Ointment 1 Application(s) Both EYES every 6 hours PRN eye dryness  sodium chloride 0.65% Nasal 1 Spray(s) Both Nostrils every 12 hours PRN Congestion      LABS:                        9.5    8.67  )-----------( 125      ( 02 Apr 2024 23:40 )             31.7     04-02    135  |  99  |  14  ----------------------------<  142<H>  3.6   |  25  |  <0.30<L>    Ca    9.5      02 Apr 2024 23:40  Phos  3.1     04-02  Mg     1.9     04-02    TPro  7.1  /  Alb  3.5  /  TBili  0.6  /  DBili  x   /  AST  17  /  ALT  20  /  AlkPhos  49  04-02    PT/INR - ( 02 Apr 2024 23:40 )   PT: 13.2 sec;   INR: 1.27 ratio         PTT - ( 02 Apr 2024 23:40 )  PTT:28.3 sec  Urinalysis Basic - ( 03 Apr 2024 00:22 )    Color: Yellow / Appearance: Clear / SG: >1.030 / pH: x  Gluc: x / Ketone: Trace mg/dL  / Bili: Negative / Urobili: 0.2 mg/dL   Blood: x / Protein: Trace mg/dL / Nitrite: Positive   Leuk Esterase: Small / RBC: 3 /HPF / WBC 11 /HPF   Sq Epi: x / Non Sq Epi: 1 /HPF / Bacteria: Many /HPF        Venous Blood Gas:  04-02 @ 23:28  7.42/44/19/28/25.2  VBG Lactate: 3.1    CAPILLARY BLOOD GLUCOSE    MICROBIOLOGY:     RADIOLOGY:  [ ] Reviewed and interpreted by me    Mode: AC/ CMV (Assist Control/ Continuous Mandatory Ventilation)  RR (machine): 12  TV (machine): 300  FiO2: 30  PEEP: 5  ITime: 0.72  MAP: 9  PIP: 21

## 2024-04-05 LAB
ALBUMIN SERPL ELPH-MCNC: 3 G/DL — LOW (ref 3.3–5)
ALP SERPL-CCNC: 41 U/L — SIGNIFICANT CHANGE UP (ref 40–120)
ALT FLD-CCNC: 19 U/L — SIGNIFICANT CHANGE UP (ref 10–45)
ANION GAP SERPL CALC-SCNC: 10 MMOL/L — SIGNIFICANT CHANGE UP (ref 5–17)
AST SERPL-CCNC: 25 U/L — SIGNIFICANT CHANGE UP (ref 10–40)
BASOPHILS # BLD AUTO: 0.01 K/UL — SIGNIFICANT CHANGE UP (ref 0–0.2)
BASOPHILS NFR BLD AUTO: 0.2 % — SIGNIFICANT CHANGE UP (ref 0–2)
BILIRUB SERPL-MCNC: 0.5 MG/DL — SIGNIFICANT CHANGE UP (ref 0.2–1.2)
BUN SERPL-MCNC: 9 MG/DL — SIGNIFICANT CHANGE UP (ref 7–23)
CALCIUM SERPL-MCNC: 8.7 MG/DL — SIGNIFICANT CHANGE UP (ref 8.4–10.5)
CHLORIDE SERPL-SCNC: 109 MMOL/L — HIGH (ref 96–108)
CO2 SERPL-SCNC: 22 MMOL/L — SIGNIFICANT CHANGE UP (ref 22–31)
CREAT SERPL-MCNC: <0.3 MG/DL — LOW (ref 0.5–1.3)
EGFR: 113 ML/MIN/1.73M2 — SIGNIFICANT CHANGE UP
EOSINOPHIL # BLD AUTO: 0.01 K/UL — SIGNIFICANT CHANGE UP (ref 0–0.5)
EOSINOPHIL NFR BLD AUTO: 0.2 % — SIGNIFICANT CHANGE UP (ref 0–6)
GLUCOSE BLDC GLUCOMTR-MCNC: 178 MG/DL — HIGH (ref 70–99)
GLUCOSE BLDC GLUCOMTR-MCNC: 178 MG/DL — HIGH (ref 70–99)
GLUCOSE BLDC GLUCOMTR-MCNC: 184 MG/DL — HIGH (ref 70–99)
GLUCOSE BLDC GLUCOMTR-MCNC: 315 MG/DL — HIGH (ref 70–99)
GLUCOSE SERPL-MCNC: 218 MG/DL — HIGH (ref 70–99)
GRAM STN FLD: ABNORMAL
HCT VFR BLD CALC: 28.4 % — LOW (ref 34.5–45)
HGB BLD-MCNC: 7.9 G/DL — LOW (ref 11.5–15.5)
IMM GRANULOCYTES NFR BLD AUTO: 2.1 % — HIGH (ref 0–0.9)
LACTATE SERPL-SCNC: 2.3 MMOL/L — HIGH (ref 0.5–2)
LYMPHOCYTES # BLD AUTO: 2.02 K/UL — SIGNIFICANT CHANGE UP (ref 1–3.3)
LYMPHOCYTES # BLD AUTO: 30.7 % — SIGNIFICANT CHANGE UP (ref 13–44)
MAGNESIUM SERPL-MCNC: 1.5 MG/DL — LOW (ref 1.6–2.6)
MCHC RBC-ENTMCNC: 24.6 PG — LOW (ref 27–34)
MCHC RBC-ENTMCNC: 27.8 GM/DL — LOW (ref 32–36)
MCV RBC AUTO: 88.5 FL — SIGNIFICANT CHANGE UP (ref 80–100)
MONOCYTES # BLD AUTO: 0.41 K/UL — SIGNIFICANT CHANGE UP (ref 0–0.9)
MONOCYTES NFR BLD AUTO: 6.2 % — SIGNIFICANT CHANGE UP (ref 2–14)
NEUTROPHILS # BLD AUTO: 3.99 K/UL — SIGNIFICANT CHANGE UP (ref 1.8–7.4)
NEUTROPHILS NFR BLD AUTO: 60.6 % — SIGNIFICANT CHANGE UP (ref 43–77)
NRBC # BLD: 0 /100 WBCS — SIGNIFICANT CHANGE UP (ref 0–0)
PHOSPHATE SERPL-MCNC: 3 MG/DL — SIGNIFICANT CHANGE UP (ref 2.5–4.5)
PLATELET # BLD AUTO: 104 K/UL — LOW (ref 150–400)
POTASSIUM SERPL-MCNC: 3.6 MMOL/L — SIGNIFICANT CHANGE UP (ref 3.5–5.3)
POTASSIUM SERPL-SCNC: 3.6 MMOL/L — SIGNIFICANT CHANGE UP (ref 3.5–5.3)
PROCALCITONIN SERPL-MCNC: 6.65 NG/ML — HIGH (ref 0.02–0.1)
PROT SERPL-MCNC: 6.3 G/DL — SIGNIFICANT CHANGE UP (ref 6–8.3)
RBC # BLD: 3.21 M/UL — LOW (ref 3.8–5.2)
RBC # FLD: 19 % — HIGH (ref 10.3–14.5)
SODIUM SERPL-SCNC: 141 MMOL/L — SIGNIFICANT CHANGE UP (ref 135–145)
SPECIMEN SOURCE: SIGNIFICANT CHANGE UP
WBC # BLD: 6.58 K/UL — SIGNIFICANT CHANGE UP (ref 3.8–10.5)
WBC # FLD AUTO: 6.58 K/UL — SIGNIFICANT CHANGE UP (ref 3.8–10.5)

## 2024-04-05 PROCEDURE — 99233 SBSQ HOSP IP/OBS HIGH 50: CPT | Mod: FS

## 2024-04-05 PROCEDURE — 99232 SBSQ HOSP IP/OBS MODERATE 35: CPT

## 2024-04-05 RX ORDER — IPRATROPIUM/ALBUTEROL SULFATE 18-103MCG
3 AEROSOL WITH ADAPTER (GRAM) INHALATION EVERY 6 HOURS
Refills: 0 | Status: DISCONTINUED | OUTPATIENT
Start: 2024-04-05 | End: 2024-05-01

## 2024-04-05 RX ORDER — MAGNESIUM SULFATE 500 MG/ML
2 VIAL (ML) INJECTION EVERY 4 HOURS
Refills: 0 | Status: COMPLETED | OUTPATIENT
Start: 2024-04-05 | End: 2024-04-05

## 2024-04-05 RX ORDER — METOCLOPRAMIDE HCL 10 MG
10 TABLET ORAL ONCE
Refills: 0 | Status: DISCONTINUED | OUTPATIENT
Start: 2024-04-05 | End: 2024-04-05

## 2024-04-05 RX ORDER — ONDANSETRON 8 MG/1
4 TABLET, FILM COATED ORAL EVERY 8 HOURS
Refills: 0 | Status: DISCONTINUED | OUTPATIENT
Start: 2024-04-05 | End: 2024-05-01

## 2024-04-05 RX ORDER — SODIUM CHLORIDE 9 MG/ML
1000 INJECTION, SOLUTION INTRAVENOUS ONCE
Refills: 0 | Status: COMPLETED | OUTPATIENT
Start: 2024-04-05 | End: 2024-04-05

## 2024-04-05 RX ORDER — AMLODIPINE BESYLATE 2.5 MG/1
10 TABLET ORAL
Refills: 0 | Status: DISCONTINUED | OUTPATIENT
Start: 2024-04-05 | End: 2024-05-01

## 2024-04-05 RX ORDER — TRAMADOL HYDROCHLORIDE 50 MG/1
25 TABLET ORAL EVERY 8 HOURS
Refills: 0 | Status: DISCONTINUED | OUTPATIENT
Start: 2024-04-05 | End: 2024-04-07

## 2024-04-05 RX ADMIN — Medication 1 TABLET(S): at 11:51

## 2024-04-05 RX ADMIN — CHLORHEXIDINE GLUCONATE 15 MILLILITER(S): 213 SOLUTION TOPICAL at 05:13

## 2024-04-05 RX ADMIN — Medication 2 DROP(S): at 22:44

## 2024-04-05 RX ADMIN — Medication 2 DROP(S): at 05:13

## 2024-04-05 RX ADMIN — LIDOCAINE 1 PATCH: 4 CREAM TOPICAL at 10:00

## 2024-04-05 RX ADMIN — Medication 5 MILLILITER(S): at 22:44

## 2024-04-05 RX ADMIN — Medication 2 DROP(S): at 17:57

## 2024-04-05 RX ADMIN — Medication 500 MILLIGRAM(S): at 11:51

## 2024-04-05 RX ADMIN — Medication 87.5 MICROGRAM(S): at 05:11

## 2024-04-05 RX ADMIN — Medication 5 MILLILITER(S): at 05:12

## 2024-04-05 RX ADMIN — INSULIN GLARGINE 7 UNIT(S): 100 INJECTION, SOLUTION SUBCUTANEOUS at 17:41

## 2024-04-05 RX ADMIN — Medication 1: at 11:52

## 2024-04-05 RX ADMIN — FIDAXOMICIN 200 MILLIGRAM(S): 200 GRANULE, FOR SUSPENSION ORAL at 17:41

## 2024-04-05 RX ADMIN — SODIUM CHLORIDE 50 MILLILITER(S): 9 INJECTION, SOLUTION INTRAVENOUS at 01:23

## 2024-04-05 RX ADMIN — Medication 2 DROP(S): at 11:52

## 2024-04-05 RX ADMIN — FIDAXOMICIN 200 MILLIGRAM(S): 200 GRANULE, FOR SUSPENSION ORAL at 05:12

## 2024-04-05 RX ADMIN — Medication 5 MILLILITER(S): at 11:52

## 2024-04-05 RX ADMIN — LIDOCAINE 1 PATCH: 4 CREAM TOPICAL at 22:41

## 2024-04-05 RX ADMIN — LIDOCAINE 1 PATCH: 4 CREAM TOPICAL at 07:57

## 2024-04-05 RX ADMIN — Medication 3 MILLILITER(S): at 23:36

## 2024-04-05 RX ADMIN — Medication 25 GRAM(S): at 16:14

## 2024-04-05 RX ADMIN — LIDOCAINE 1 PATCH: 4 CREAM TOPICAL at 07:56

## 2024-04-05 RX ADMIN — Medication 1: at 05:35

## 2024-04-05 RX ADMIN — Medication 2 DROP(S): at 17:42

## 2024-04-05 RX ADMIN — Medication 2 DROP(S): at 11:53

## 2024-04-05 RX ADMIN — SIMETHICONE 80 MILLIGRAM(S): 80 TABLET, CHEWABLE ORAL at 17:41

## 2024-04-05 RX ADMIN — Medication 1: at 01:19

## 2024-04-05 RX ADMIN — Medication 5 MILLILITER(S): at 01:22

## 2024-04-05 RX ADMIN — GABAPENTIN 100 MILLIGRAM(S): 400 CAPSULE ORAL at 22:40

## 2024-04-05 RX ADMIN — SODIUM CHLORIDE 1000 MILLILITER(S): 9 INJECTION, SOLUTION INTRAVENOUS at 05:35

## 2024-04-05 RX ADMIN — ESCITALOPRAM OXALATE 10 MILLIGRAM(S): 10 TABLET, FILM COATED ORAL at 17:43

## 2024-04-05 RX ADMIN — Medication 3 MILLIGRAM(S): at 22:42

## 2024-04-05 RX ADMIN — PANTOPRAZOLE SODIUM 40 MILLIGRAM(S): 20 TABLET, DELAYED RELEASE ORAL at 05:11

## 2024-04-05 RX ADMIN — LIDOCAINE 1 PATCH: 4 CREAM TOPICAL at 22:40

## 2024-04-05 RX ADMIN — Medication 3 MILLILITER(S): at 11:29

## 2024-04-05 RX ADMIN — Medication 1000 MILLIGRAM(S): at 18:12

## 2024-04-05 RX ADMIN — Medication 4: at 17:42

## 2024-04-05 RX ADMIN — GABAPENTIN 100 MILLIGRAM(S): 400 CAPSULE ORAL at 09:53

## 2024-04-05 RX ADMIN — SODIUM CHLORIDE 50 MILLILITER(S): 9 INJECTION, SOLUTION INTRAVENOUS at 11:53

## 2024-04-05 RX ADMIN — QUETIAPINE FUMARATE 12.5 MILLIGRAM(S): 200 TABLET, FILM COATED ORAL at 16:14

## 2024-04-05 RX ADMIN — CHLORHEXIDINE GLUCONATE 1 APPLICATION(S): 213 SOLUTION TOPICAL at 05:14

## 2024-04-05 RX ADMIN — Medication 5 MILLIGRAM(S): at 12:31

## 2024-04-05 RX ADMIN — Medication 1000 MILLIGRAM(S): at 17:42

## 2024-04-05 RX ADMIN — SIMETHICONE 80 MILLIGRAM(S): 80 TABLET, CHEWABLE ORAL at 05:11

## 2024-04-05 RX ADMIN — Medication 2 DROP(S): at 01:22

## 2024-04-05 RX ADMIN — Medication 25 GRAM(S): at 11:53

## 2024-04-05 RX ADMIN — SODIUM CHLORIDE 50 MILLILITER(S): 9 INJECTION, SOLUTION INTRAVENOUS at 22:42

## 2024-04-05 RX ADMIN — AMLODIPINE BESYLATE 10 MILLIGRAM(S): 2.5 TABLET ORAL at 09:52

## 2024-04-05 RX ADMIN — CHLORHEXIDINE GLUCONATE 15 MILLILITER(S): 213 SOLUTION TOPICAL at 17:56

## 2024-04-05 RX ADMIN — Medication 5 MILLILITER(S): at 17:56

## 2024-04-05 RX ADMIN — Medication 3 MILLILITER(S): at 17:19

## 2024-04-05 NOTE — PROGRESS NOTE ADULT - SUBJECTIVE AND OBJECTIVE BOX
Follow Up:  c diff    Interval History/ROS:  sleeping   aide at bedside notes modest diarrhea    Allergies  pineapple (Unknown)  Tagamet (Unknown)  heparin (Unknown)  walnut (Unknown)  metronidazole (Rash)  penicillin (Unknown)  Lyrica (Unknown)  PecanAndressa, Hazelnut (Unknown)      ANTIMICROBIALS:  fidaxomicin 200 two times a day      OTHER MEDS:  MEDICATIONS  (STANDING):  acetaminophen   Oral Liquid .. 1000 every 6 hours PRN  albuterol/ipratropium for Nebulization 3 every 6 hours  amLODIPine   Tablet 10 <User Schedule>  escitalopram 10 <User Schedule>  fentaNYL   Patch  25 MICROgram(s)/Hr 1 every 72 hours  gabapentin Solution 100 <User Schedule>  insulin glargine Injectable (LANTUS) 7 <User Schedule>  insulin lispro (ADMELOG) corrective regimen sliding scale  every 6 hours  levothyroxine Injectable 87.5 <User Schedule>  melatonin Liquid 3 at bedtime  pantoprazole  Injectable 40 <User Schedule>  predniSONE  Solution 5 <User Schedule>  QUEtiapine 12.5 <User Schedule>  simethicone 80 every 12 hours  traMADol 25 every 8 hours PRN      Vital Signs Last 24 Hrs  T(C): 36.7 (05 Apr 2024 12:00), Max: 36.9 (05 Apr 2024 00:48)  T(F): 98.1 (05 Apr 2024 12:00), Max: 98.4 (05 Apr 2024 00:48)  HR: 80 (05 Apr 2024 18:00) (80 - 109)  BP: 121/50 (05 Apr 2024 12:00) (85/43 - 133/51)  BP(mean): 78 (05 Apr 2024 06:26) (78 - 78)  RR: 20 (05 Apr 2024 12:00) (20 - 20)  SpO2: 98% (05 Apr 2024 18:00) (97% - 100%)    Parameters below as of 05 Apr 2024 18:00  Patient On (Oxygen Delivery Method): ventilator        PHYSICAL EXAM:  General: chronically ill appearing   Neurology: sleeping   rouses  Respiratory: Clear to auscultation bilaterally  CV: RRR, S1S2, no murmurs, rubs or gallops  Abdominal: Soft, distended  Extremities: No edema  Line Sites: Clear  Skin: No rash                          7.9    6.58  )-----------( 104      ( 05 Apr 2024 06:36 )             28.4       04-05    141  |  109<H>  |  9   ----------------------------<  218<H>  3.6   |  22  |  <0.30<L>    Ca    8.7      05 Apr 2024 06:36  Phos  3.0     04-05  Mg     1.5     04-05    TPro  6.3  /  Alb  3.0<L>  /  TBili  0.5  /  DBili  x   /  AST  25  /  ALT  19  /  AlkPhos  41  04-05  Urine Microscopic-Add On (NC) (04.03.24 @ 00:22)   Review: Reviewed  Cast: 0 /LPF  Epithelial Cells: 1 /HPF  Bacteria: Many /HPF  Red Blood Cell - Urine: 3 /HPF  White Blood Cell - Urine: 11 /HPF      MICROBIOLOGY:  Clean Catch Clean Catch (Midstream)  04-03-24   <10,000 CFU/mL Normal Urogenital Shanda  --  --      .Blood Blood-Peripheral  04-02-24   No growth at 48 Hours  --  --      RADIOLOGY:  < from: Xray Feeding Tube Check (04.04.24 @ 17:13) >  FINDINGS:  Contrast is seen entering the stomach with a gastrostomy tube with   residual contrast in the small bowel and colon. There is no extraluminal   contrast visualized. Nonobstructive bowel gas pattern.  There is no evidence of intraperitoneal free air.  There is no evidence of organomegaly or pathologic calcification.  The visualized osseous structures demonstrate no acute pathology.    IMPRESSION:  Contrast is seen within the stomach with no radiographic evidence for   extraluminal contrast.    < end of copied text >  < from: CT Abdomen and Pelvis w/ IV Cont (04.02.24 @ 15:22) >  FINDINGS:  LOWER CHEST: Within normal limits.    LIVER: Within normal limits.  BILE DUCTS: Normal caliber.  GALLBLADDER: Within normal limits.  SPLEEN: Within normal limits.  PANCREAS: Within normal limits.  ADRENALS: Within normal limits.  KIDNEYS/URETERS: Within normal limits.    BLADDER: Within normal limits.  REPRODUCTIVE ORGANS: Stable 2.5 cm right adnexal cyst.    BOWEL: No bowel obstruction. Appendix is not visualized. Dislodged PEG   tube with balloon in the anterior abdominal wall with air-filled tract to   stomach.  PERITONEUM: No ascites.  VESSELS: Atherosclerotic changes.  RETROPERITONEUM/LYMPH NODES: No lymphadenopathy.  ABDOMINAL WALL: Stable skin thickening along the gluteal cleft.  BONES: Degenerative changes.    IMPRESSION:  Dislodged PEG tube with balloon in the anterior abdominal wall with   air-filled tract to stomach.    < end of copied text >      Zion Newman MD; Division of Infectious Disease; Pager: [phone]; nights and weekends: [phone]

## 2024-04-05 NOTE — PROGRESS NOTE ADULT - ASSESSMENT
73yo woman  h/o T2DM (4/4/24: A1C = 6.2%), HTN, HLD, anemia, hypothyroidism, RA, fibromyalgia, remote cerebral aneurysm repair, acute hypercapnic respiratory failure with tracheostomy, PEG tube (initially placed 2022), and bedbound, recurrent C diff presented for PEG tube dislodgment. Admitted 4/2/24  weight = 54.5 kg    Recurrent C diff - first episode Aug 2023 treated with tapering PO vanco, recurrent episode 1/31/24 treated with PO vanco and then fidaxomicin completed in Feb - most recently tested positive 3/20/24 seen by Dr. Newman 3/29 outpatient, started on fidaxomicin that day - tube feeds concurrently held, unclear if improving afterwards per family  Chronic sacral decubitus wound  3/20 Klebisella bacteruria R only to amp and sputum few Pseudomonas R to FQs w/o polys on gram stian - treatment of urine was deferred to avoid exacerbating C diff    Tmax 100, no leukocytosis  Concern for cellulitis around PEG tube site - area with very minimal erythema, remarkable receded from skin norm placed on admission  UA 11 WBC  CT a/p: no infectious focus or colitis  MRSA PCR+    4/3 IR - PEG exchanged bedside to 20 Fr Kangaroo EnFit  - Bedside XR demonstrates appropriately positioned G-tube with contrast filling into the stomach and bowel.    4/2 Blood Cultures x 2 NGTD;  Positive MRSA/MSSA PCR  4/3 Urine cultures NEG  4/4 fever  4/4 Wound care assessment appreciated:   " area of persistent nonblanchable dark discoloration that is inconsistent with a patient's surrounding skin tone as well as a white juan j film noted over B/L buttocks/sacral skin, area measures approximately 10cm x 10cm x 0cm- presentation is consistent with a deep tissue injury in evolution with incontinence and fungal involvement present on admission. Within the apex of the gluteal cleft/base of sacrum there is full thickness skin loss with red, beefy tissue noted, area measures approximately 3cm x 1cm x 0.3cm- presentation is consistent with a deep tissue injury in evolution with incontinence involvement present on admission."    Antibiotics  Meropenem 4/2 -->4/3  IV Vanco 4/2  Fdaxomicin 4/3-->    PEG site no obvious cellulitis - probably more irritation from displacement/leaked contents, no indication for antimicrobials.    Fever may relate to aspiration, atelectasis, cdiff other     Suggest:  - will continue fidaxomicin for total 10 day course  - otherwise monitor off additional Abx  - monitor bowel movements/output .  avoid other antibiotics as feasible    given multiple recurrences in context of advanced age, debility and multiple co-morbidities, administration of Bezlotoxumab (Zinplava) 500 mg over 1 hours is indicated to reduce future recurrences  Ref: Norm Wheeler M.D., Rojas Contreras M.D., Scott Griffith, Ph.D., Shiva Abraham M.D., Gabriel Falcon D.O., Pedro Weiss M.D., Talib Hampton M.D., Sena Hughes M.D., Raimundo Marte M.D., Melissa Soria M.D., Piyush Lamas M.D., Eliceo Romero M.D., Neyda Xie M.D., Jude Peters M.D., Neelima Vela B.S.M.T., Hannah Cardenas, Ph.D., Ana Quiroz, B.S., Obie Chirinos, M.S., Khushbu Flores M.D., Jean Carlos Ayala M.D., and Kristin Lopez, Ph.D., for the [employer]*    Bezlotoxumab is suggested in IDSA guidelines -  Ref: Clinical Practice Guidelines for the Management of Clostridioides difficile Infection in Adults: 2021 Update by SHEA/IDSA  Published VERNA, 6/14/2021; Clinical Infectious Diseases, idup877, https://doi.org/10.1093/verna/[email or web address]    discussed with jesus    I will be away until 4/15  ID service will continue to follow

## 2024-04-05 NOTE — PROGRESS NOTE ADULT - PROBLEM SELECTOR PLAN 1
PEG tube dislodgement on admission   - S/p CT abd/pelvis showing dislodgement of G tube with balloon in the anterior abdominal wall with air filled track to stomach.  - initially GI and Surgery consulted   - S/p bedside exchange by IR on 4/3- # 20 Fr Kangaroo EnFit placed   - 4/4:PEG leaking.  Feeds held.  IR adjusted PEG at bedside. G-tube study pending to exclude extravasation. Patient remains NPO except for meds, on IVF, LR at 50ml/hr.

## 2024-04-05 NOTE — PROGRESS NOTE ADULT - SUBJECTIVE AND OBJECTIVE BOX
Patient is a 74y old  Female who presents with a chief complaint of Dislodged G Tube (04 Apr 2024 11:02)      Interval Events:    REVIEW OF SYSTEMS:  [ ] Positive  [ ] All other systems negative  [ ] Unable to assess ROS because ________    Vital Signs Last 24 Hrs  T(C): 36.8 (04-05-24 @ 05:39), Max: 38.6 (04-04-24 @ 12:28)  T(F): 98.2 (04-05-24 @ 05:39), Max: 101.4 (04-04-24 @ 12:28)  HR: 92 (04-05-24 @ 06:26) (86 - 109)  BP: 133/51 (04-05-24 @ 06:26) (85/43 - 145/66)  RR: 20 (04-05-24 @ 05:39) (18 - 22)  SpO2: 97% (04-05-24 @ 05:39) (95% - 100%)PHYSICAL EXAM:  HEENT:   [ ]Tracheostomy:  [ ]Pupils equal  [ ]No oral lesions  [ ]Abnormal        SKIN  [ ]No Rash  [ ] Abnormal  [ ] pressure    CARDIAC  [ ]Regular  [ ]Abnormal    PULMONARY  [ ]Bilateral Clear Breath Sounds  [ ]Normal Excursion  [ ]Abnormal    GI  [ ]PEG      [ ] +BS		              [ ]Soft, nondistended, nontender	  [ ]Abnormal    MUSCULOSKELETAL                                   [ ]Bedbound                 [ ]Abnormal    [ ]Ambulatory/OOB to chair                           EXTREMITIES                                         [ ]Normal  [ ]Edema                           NEUROLOGIC  [ ] Normal, non focal  [ ] Focal findings:    PSYCHIATRIC  [ ]Alert and appropriate  [ ] Sedated	 [ ]Agitated    :  Mcbride: [ ] Yes, if yes: Date of Placement:                   [  ] No    LINES: Central Lines [ ] Yes, if yes: Date of Placement                                     [  ] No    HOSPITAL MEDICATIONS:  MEDICATIONS  (STANDING):  amLODIPine   Tablet 10 milliGRAM(s) Oral <User Schedule>  artificial tears (preservative free) Ophthalmic Solution 2 Drop(s) Both EYES every 6 hours  ascorbic acid 500 milliGRAM(s) Oral daily  Biotene Dry Mouth Oral Rinse 5 milliLiter(s) Swish and Spit every 6 hours  chlorhexidine 0.12% Liquid 15 milliLiter(s) Oral Mucosa every 12 hours  chlorhexidine 4% Liquid 1 Application(s) Topical <User Schedule>  escitalopram 10 milliGRAM(s) Oral <User Schedule>  fentaNYL   Patch  25 MICROgram(s)/Hr 1 Patch Transdermal every 72 hours  fidaxomicin 200 milliGRAM(s) Oral two times a day  gabapentin Solution 100 milliGRAM(s) Oral <User Schedule>  insulin glargine Injectable (LANTUS) 7 Unit(s) SubCutaneous <User Schedule>  insulin lispro (ADMELOG) corrective regimen sliding scale   SubCutaneous every 6 hours  lactated ringers Bolus 1000 milliLiter(s) IV Bolus once  lactated ringers. 1000 milliLiter(s) (50 mL/Hr) IV Continuous <Continuous>  levothyroxine Injectable 87.5 MICROGram(s) IV Push <User Schedule>  lidocaine   4% Patch 1 Patch Transdermal at bedtime  lidocaine   4% Patch 1 Patch Transdermal at bedtime  melatonin Liquid 3 milliGRAM(s) Oral at bedtime  multivitamin 1 Tablet(s) Oral daily  pantoprazole  Injectable 40 milliGRAM(s) IV Push <User Schedule>  predniSONE  Solution 5 milliGRAM(s) Oral <User Schedule>  QUEtiapine 12.5 milliGRAM(s) Oral <User Schedule>  simethicone 80 milliGRAM(s) Chew every 12 hours  tobramycin 0.3% Ophthalmic Solution 2 Drop(s) Both EYES every 6 hours    MEDICATIONS  (PRN):  acetaminophen   Oral Liquid .. 1000 milliGRAM(s) Oral every 6 hours PRN Temp greater or equal to 38C (100.4F), Mild Pain (1 - 3)  albuterol/ipratropium for Nebulization 3 milliLiter(s) Nebulizer every 6 hours PRN Shortness of Breath and/or Wheezing  petrolatum Ophthalmic Ointment 1 Application(s) Both EYES every 6 hours PRN eye dryness  sodium chloride 0.65% Nasal 1 Spray(s) Both Nostrils every 12 hours PRN Congestion      LABS:                        7.9    6.58  )-----------( 104      ( 05 Apr 2024 06:36 )             28.4     04-05    141  |  109<H>  |  9   ----------------------------<  218<H>  3.6   |  22  |  <0.30<L>    Ca    8.7      05 Apr 2024 06:36  Phos  3.0     04-05  Mg     1.5     04-05    TPro  6.3  /  Alb  3.0<L>  /  TBili  0.5  /  DBili  x   /  AST  25  /  ALT  19  /  AlkPhos  41  04-05    PT/INR - ( 04 Apr 2024 07:14 )   PT: 14.2 sec;   INR: 1.36 ratio         PTT - ( 04 Apr 2024 07:14 )  PTT:30.3 sec  Urinalysis Basic - ( 05 Apr 2024 06:36 )    Color: x / Appearance: x / SG: x / pH: x  Gluc: 218 mg/dL / Ketone: x  / Bili: x / Urobili: x   Blood: x / Protein: x / Nitrite: x   Leuk Esterase: x / RBC: x / WBC x   Sq Epi: x / Non Sq Epi: x / Bacteria: x        Venous Blood Gas:  04-04 @ 07:17  7.29/55/34/26/54.1  VBG Lactate: 2.0    CAPILLARY BLOOD GLUCOSE    MICROBIOLOGY:     RADIOLOGY:  [ ] Reviewed and interpreted by me    Mode: AC/ CMV (Assist Control/ Continuous Mandatory Ventilation)  RR (machine): 12  TV (machine): 300  FiO2: 30  PEEP: 5  ITime: 1  MAP: 7  PIP: 26   Patient is a 74y old  Female who presents with a chief complaint of Dislodged G Tube (04 Apr 2024 11:02)      Interval Events: Hypotension s/p IV Hydration                         Peg Leak s/p G tube repositioning/tightening by IR     REVIEW OF SYSTEMS:  [x ] Positive: Communicates  with mouthing words and head nods; + abdominal pain   [ ] All other systems negative  [ ] Unable to assess ROS because ________    Vital Signs Last 24 Hrs  T(C): 36.8 (04-05-24 @ 05:39), Max: 38.6 (04-04-24 @ 12:28)  T(F): 98.2 (04-05-24 @ 05:39), Max: 101.4 (04-04-24 @ 12:28)  HR: 92 (04-05-24 @ 06:26) (86 - 109)  BP: 133/51 (04-05-24 @ 06:26) (85/43 - 145/66)  RR: 20 (04-05-24 @ 05:39) (18 - 22)  SpO2: 97% (04-05-24 @ 05:39) (95% - 100%)    PHYSICAL EXAM:    HEENT:   [x ]Tracheostomy: #8 Distal XLT Shiley   [ ]Pupils equal  [x ]No oral lesions  [ ]Abnormal    SKIN  [ ]No Rash  [x ] Abnormal: + sacral/buttock injury   [ ] pressure    CARDIAC  [x ]Regular  [ ]Abnormal    PULMONARY  [x]Bilateral Clear Breath Sounds  [ ]Normal Excursion  [ ]Abnormal    GI  [x ]PEG site w/ dry dressing, minimal drainage noted on guaze    [x ] +BS		              [x]Soft, nondistended, nontender	  [ ]Abnormal    MUSCULOSKELETAL                                   [x ]Bedbound                 [ ]Abnormal    [ ]Ambulatory/OOB to chair                           EXTREMITIES                                         [x ]Normal  [ ]Edema                           NEUROLOGIC  [ ] Normal, non focal  [x ] Focal findings: Opens eyes intermittently,  Alert and Oriented x3; responds to questions by mouthing words; +gag reflex/cough;   moving all extremities on commands, + generalized weakness d/t deconditioning/ Severe RA     PSYCHIATRIC  [x ] Mood appropriate   [ ] Sedated	 [ ]Agitated    :  Mcbride: [ ] Yes, if yes: Date of Placement:                   [x  ] No    LINES: Central Lines [ ] Yes, if yes: Date of Placement                                     [x  ] No      HOSPITAL MEDICATIONS:  MEDICATIONS  (STANDING):  amLODIPine   Tablet 10 milliGRAM(s) Oral <User Schedule>  artificial tears (preservative free) Ophthalmic Solution 2 Drop(s) Both EYES every 6 hours  ascorbic acid 500 milliGRAM(s) Oral daily  Biotene Dry Mouth Oral Rinse 5 milliLiter(s) Swish and Spit every 6 hours  chlorhexidine 0.12% Liquid 15 milliLiter(s) Oral Mucosa every 12 hours  chlorhexidine 4% Liquid 1 Application(s) Topical <User Schedule>  escitalopram 10 milliGRAM(s) Oral <User Schedule>  fentaNYL   Patch  25 MICROgram(s)/Hr 1 Patch Transdermal every 72 hours  fidaxomicin 200 milliGRAM(s) Oral two times a day  gabapentin Solution 100 milliGRAM(s) Oral <User Schedule>  insulin glargine Injectable (LANTUS) 7 Unit(s) SubCutaneous <User Schedule>  insulin lispro (ADMELOG) corrective regimen sliding scale   SubCutaneous every 6 hours  lactated ringers Bolus 1000 milliLiter(s) IV Bolus once  lactated ringers. 1000 milliLiter(s) (50 mL/Hr) IV Continuous <Continuous>  levothyroxine Injectable 87.5 MICROGram(s) IV Push <User Schedule>  lidocaine   4% Patch 1 Patch Transdermal at bedtime  lidocaine   4% Patch 1 Patch Transdermal at bedtime  melatonin Liquid 3 milliGRAM(s) Oral at bedtime  multivitamin 1 Tablet(s) Oral daily  pantoprazole  Injectable 40 milliGRAM(s) IV Push <User Schedule>  predniSONE  Solution 5 milliGRAM(s) Oral <User Schedule>  QUEtiapine 12.5 milliGRAM(s) Oral <User Schedule>  simethicone 80 milliGRAM(s) Chew every 12 hours  tobramycin 0.3% Ophthalmic Solution 2 Drop(s) Both EYES every 6 hours    MEDICATIONS  (PRN):  acetaminophen   Oral Liquid .. 1000 milliGRAM(s) Oral every 6 hours PRN Temp greater or equal to 38C (100.4F), Mild Pain (1 - 3)  albuterol/ipratropium for Nebulization 3 milliLiter(s) Nebulizer every 6 hours PRN Shortness of Breath and/or Wheezing  petrolatum Ophthalmic Ointment 1 Application(s) Both EYES every 6 hours PRN eye dryness  sodium chloride 0.65% Nasal 1 Spray(s) Both Nostrils every 12 hours PRN Congestion      LABS:                        7.9    6.58  )-----------( 104      ( 05 Apr 2024 06:36 )             28.4     04-05    141  |  109<H>  |  9   ----------------------------<  218<H>  3.6   |  22  |  <0.30<L>    Ca    8.7      05 Apr 2024 06:36  Phos  3.0     04-05  Mg     1.5     04-05    TPro  6.3  /  Alb  3.0<L>  /  TBili  0.5  /  DBili  x   /  AST  25  /  ALT  19  /  AlkPhos  41  04-05    PT/INR - ( 04 Apr 2024 07:14 )   PT: 14.2 sec;   INR: 1.36 ratio         PTT - ( 04 Apr 2024 07:14 )  PTT:30.3 sec  Urinalysis Basic - ( 05 Apr 2024 06:36 )    Color: x / Appearance: x / SG: x / pH: x  Gluc: 218 mg/dL / Ketone: x  / Bili: x / Urobili: x   Blood: x / Protein: x / Nitrite: x   Leuk Esterase: x / RBC: x / WBC x   Sq Epi: x / Non Sq Epi: x / Bacteria: x        Venous Blood Gas:  04-04 @ 07:17  7.29/55/34/26/54.1  VBG Lactate: 2.0    CAPILLARY BLOOD GLUCOSE    MICROBIOLOGY:     RADIOLOGY:  [ ] Reviewed and interpreted by me    Mode: AC/ CMV (Assist Control/ Continuous Mandatory Ventilation)  RR (machine): 12  TV (machine): 300  FiO2: 30  PEEP: 5  ITime: 1  MAP: 7  PIP: 26

## 2024-04-05 NOTE — PROGRESS NOTE ADULT - NS ATTEND AMEND GEN_ALL_CORE FT
74 year old female with diabetes, hypertension, hyperlipidemia, hypothyroidism, RA on Prednisone, fibromyalgia, cerebral aneurysm s/p repair, chronic hypercapnic respiratory failure s/p trach (vent dependent) and PEG, recurrent C.diff infection (follows with Dr. Newman) who presented with dislodged PEG.       #Dislodged PEG  She is s/p bedside replacement by IR and appears to be in appropriate position. Will monitor for leakage. 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 If this is the case, will need to d/w Surgery regarding replacing.    #C.Diff  Will plan to continue Dificid as per ID. Appreciate input with this case    #Sacral wound: Pending wound care evaluation    #Fever: Isolated fever 4/4. No other localizing symptoms, no leukocytosis. Discussed with ID team, who recommended cultures and holding antibiotics.   Rest as above

## 2024-04-05 NOTE — PROGRESS NOTE ADULT - ASSESSMENT
73 yo F PMH T2DM on insulin, HTN, HLD, hypothyroidism, RA on Prednisone, Fibromyalgia, cerebral aneurysm s/p repair, chronic hypercapnic respiratory failure s/p trach (vent dependent) and Chronic PEG 2022 , recurrent C.diff infection (follows with Dr. Newman) , bedbound who presented from home with complaints of possible G tube dislodgement and redness around the site. Had CT Abdomen/Pelvis done showing dislodgement of G tube with balloon in the anterior abdominal wall with air filled track to stomach. Admitted to MICU for ventilator management and given vancomycin/ meropenum empirically for treatment of cellulitis. Per family patient has had Known c diff infection for past 2-3 weeks. Was being treated by Dr. Zion Newman, and currently receiving home Fidaxomicin.  Also report recent UTI--not currently being treated. Patient was consulted by ID, GI, Surgery and IR in MICU. Transferred to RCU 4/3.    4/3: Transferred from MICU, HD Stable, and stable on Full Vent Support settings. Received bedside peg exchange by IR today. G tube XR study confirmed peg in stomach. IR cleared for use of tube feeds/meds. ID restarted patient on home Fidaxomicin oral bid x 5 days. Wound care and Nutrition to f/u in am. Will likely proceed with discharge planning back to home w/ home services to be reinstated.   4/4: PEG leaked yesterday following replacement.  Tube feeds placed on hold.  IR attempted reposition of PEG at bedside followed G-tube study.  Awaiting confirmation whether tube can be used for feeds.  Patient also with fever 101.4F this afternoon.  ID made aware, will obtain blood and sputum cultures and continue to monitor off antibiotics.     75 yo F PMH T2DM on insulin, HTN, HLD, hypothyroidism, RA on Prednisone, Fibromyalgia, cerebral aneurysm s/p repair, chronic hypercapnic respiratory failure s/p trach (vent dependent) and Chronic PEG 2022 , recurrent C.diff infection (follows with Dr. Newman) , bedbound who presented from home with complaints of possible G tube dislodgement and redness around the site. Had CT Abdomen/Pelvis done showing dislodgement of G tube with balloon in the anterior abdominal wall with air filled track to stomach. Admitted to MICU for ventilator management and given vancomycin/ meropenum empirically for treatment of cellulitis. Per family patient has had Known c diff infection for past 2-3 weeks. Was being treated by Dr. Zion Newman, and currently receiving home Fidaxomicin.  Also report recent UTI--not currently being treated. Patient was consulted by ID, GI, Surgery and IR in MICU. Transferred to RCU 4/3.    4/3: Transferred from MICU, HD Stable, and stable on Full Vent Support settings. Received bedside peg exchange by IR today. G tube XR study confirmed peg in stomach. IR cleared for use of tube feeds/meds. ID restarted patient on home Fidaxomicin oral bid x 5 days. Wound care and Nutrition to f/u in am. Will likely proceed with discharge planning back to home w/ home services to be reinstated.   4/4: PEG leaked yesterday following replacement.  Tube feeds placed on hold.  IR attempted reposition of PEG at bedside followed G-tube study.  Awaiting confirmation whether tube can be used for feeds.  Patient also with fever 101.4F this afternoon.  ID made aware, will obtain blood and sputum cultures and continue to monitor off antibiotics.  4/5: Persistent leak from peg reported by RN staff but lessening (minimal) s/p repositioning by IR. 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 S/p isolated Fever 4/5, cultures pending. Afebrile overnight, however with Hypotension SBP to 80's- improving s/p IV Hydration. Procal elevated, however no Leukocytosis. IF respike will need to reconsult ID and start empiric Abx therapy.

## 2024-04-05 NOTE — CHART NOTE - NSCHARTNOTEFT_GEN_A_CORE
Nutrition  Chart Note  Consult for education    Diet, NPO with Tube Feed:   Tube Feeding Modality: Gastrostomy  Casie Frazier glucose support 1.2  Total Volume for 24 Hours (mL): 1300  Continuous  Starting Tube Feed Rate {mL per Hour}: 40  Increase Tube Feed Rate by (mL): 10     Every 3 hours  Until Goal Tube Feed Rate (mL per Hour): 65  Tube Feed Duration (in Hours): 20  Tube Feed Start Time: 06:00  Tube Feed Stop Time: 02:00  Free Water Flush  Pump   Rate (mL per Hour): 20   Frequency: Every 2 Hours  Alfred(7 Gm Arginine/7 Gm Glut/1.2 Gm HMB     Qty per Day:  2 (04-04-24 @ 19:07) [Active]    Dosing weight 54.5kg    MEDICATIONS  (STANDING):  amLODIPine   Tablet  ascorbic acid  fidaxomicin  insulin glargine Injectable (LANTUS)  insulin lispro (ADMELOG) corrective regimen sliding scale  lactated ringers.  levothyroxine Injectable  magnesium sulfate  IVPB  multivitamin  pantoprazole  Injectable  predniSONE  Solution  simethicone    Pertinent Labs: 04-05 @ 06:36: Na 141, BUN 9, Cr <0.30<L>, <H>, K+ 3.6, Phos 3.0, Mg 1.5<L>, Alk Phos 41, ALT/SGPT 19, AST/SGOT 25, HbA1c --    A1C with Estimated Average Glucose Result: 6.2 % (04-04-24 @ 07:40)  A1C with Estimated Average Glucose Result: 7.5 % (10-28-23 @ 07:18)    Finger Sticks:  POCT Blood Glucose.: 178 mg/dL (04-05 @ 11:44)  POCT Blood Glucose.: 178 mg/dL (04-05 @ 05:14)  POCT Blood Glucose.: 184 mg/dL (04-05 @ 00:29)  POCT Blood Glucose.: 142 mg/dL (04-04 @ 17:09)    Triglycerides, Serum: 295 mg/dL (04-04-24 @ 07:09)    4/5/24: EXTENSIVE Conversation with pt daughter Marysol Spann over phone ([phone]). Explained to pt daughter pts calorie and protein needs, current tube feeding formula and amount of calories and protein it is providing, along with rate and total volume. Pt daughter requesting a more concentrated formula so that patient can be off the tube feed for a few hours daily. At home, pt on [employer] Glucose Support 80ml/hr x 10-15 hours daily. Nutrition  Chart Note  Consult for education    Diet, NPO with Tube Feed:   Tube Feeding Modality: Gastrostomy  Casie Frazier glucose support 1.2  Total Volume for 24 Hours (mL): 1300  Continuous  Starting Tube Feed Rate {mL per Hour}: 40  Increase Tube Feed Rate by (mL): 10     Every 3 hours  Until Goal Tube Feed Rate (mL per Hour): 65  Tube Feed Duration (in Hours): 20  Tube Feed Start Time: 06:00  Tube Feed Stop Time: 02:00  Free Water Flush  Pump   Rate (mL per Hour): 20   Frequency: Every 2 Hours  Alfred(7 Gm Arginine/7 Gm Glut/1.2 Gm HMB     Qty per Day:  2 (04-04-24 @ 19:07) [Active]    Dosing weight 54.5kg    MEDICATIONS  (STANDING):  amLODIPine   Tablet  ascorbic acid  fidaxomicin  insulin glargine Injectable (LANTUS)  insulin lispro (ADMELOG) corrective regimen sliding scale  lactated ringers.  levothyroxine Injectable  magnesium sulfate  IVPB  multivitamin  pantoprazole  Injectable  predniSONE  Solution  simethicone    Pertinent Labs: 04-05 @ 06:36: Na 141, BUN 9, Cr <0.30<L>, <H>, K+ 3.6, Phos 3.0, Mg 1.5<L>, Alk Phos 41, ALT/SGPT 19, AST/SGOT 25, HbA1c --    A1C with Estimated Average Glucose Result: 6.2 % (04-04-24 @ 07:40)  A1C with Estimated Average Glucose Result: 7.5 % (10-28-23 @ 07:18)    Finger Sticks:  POCT Blood Glucose.: 178 mg/dL (04-05 @ 11:44)  POCT Blood Glucose.: 178 mg/dL (04-05 @ 05:14)  POCT Blood Glucose.: 184 mg/dL (04-05 @ 00:29)  POCT Blood Glucose.: 142 mg/dL (04-04 @ 17:09)    Triglycerides, Serum: 295 mg/dL (04-04-24 @ 07:09)    4/5/24: EXTENSIVE Conversation with pt daughter Marysol Spann over phone ([phone]). Explained to pt daughter pts calorie and protein needs, current tube feeding formula and amount of calories and protein it is providing, along with rate and total volume. Pt daughter requesting a more concentrated formula so that patient can be off the tube feed for a few hours daily. At home, pt on [employer] Glucose Support 80ml/hr x 10-15 hours daily. Per daughter, pt previously tolerating [employer] Peptide 1.5 without issue.     Daughter agreeable to trial [employer]ie [employer] Peptide 1.5. [employer] Peptide has a low glycemic index and no non-nutritive or artificial sweeteners. Daughter requesting to run tube feed for less than 20 hours to allow patient time off of the pump. Can trial 60ml/hr x 16 hours to provide a Goal volume of 960ml, 1476kcal (27kcal/kg), 71g protein (1.3g/kg) and 673ml free water. Defer free water flush to team. Defer insulin regimen to team.   Continue Alfred twice daily per team to aid in wound healing. Continue Multivitamin and Vitamin C for same.     Rufina Morris, MS, RD, CDN / Teams Nutrition  Chart Note  Consult for education    Diet, NPO with Tube Feed:   Tube Feeding Modality: Gastrostomy  Casie Frazier glucose support 1.2  Total Volume for 24 Hours (mL): 1300  Continuous  Starting Tube Feed Rate {mL per Hour}: 40  Increase Tube Feed Rate by (mL): 10     Every 3 hours  Until Goal Tube Feed Rate (mL per Hour): 65  Tube Feed Duration (in Hours): 20  Tube Feed Start Time: 06:00  Tube Feed Stop Time: 02:00  Free Water Flush  Pump   Rate (mL per Hour): 20   Frequency: Every 2 Hours  Alfred(7 Gm Arginine/7 Gm Glut/1.2 Gm HMB     Qty per Day:  2 (04-04-24 @ 19:07) [Active]    Dosing weight 54.5kg    MEDICATIONS  (STANDING):  amLODIPine   Tablet  ascorbic acid  fidaxomicin  insulin glargine Injectable (LANTUS)  insulin lispro (ADMELOG) corrective regimen sliding scale  lactated ringers.  levothyroxine Injectable  magnesium sulfate  IVPB  multivitamin  pantoprazole  Injectable  predniSONE  Solution  simethicone    Pertinent Labs: 04-05 @ 06:36: Na 141, BUN 9, Cr <0.30<L>, <H>, K+ 3.6, Phos 3.0, Mg 1.5<L>, Alk Phos 41, ALT/SGPT 19, AST/SGOT 25, HbA1c --    A1C with Estimated Average Glucose Result: 6.2 % (04-04-24 @ 07:40)  A1C with Estimated Average Glucose Result: 7.5 % (10-28-23 @ 07:18)    Finger Sticks:  POCT Blood Glucose.: 178 mg/dL (04-05 @ 11:44)  POCT Blood Glucose.: 178 mg/dL (04-05 @ 05:14)  POCT Blood Glucose.: 184 mg/dL (04-05 @ 00:29)  POCT Blood Glucose.: 142 mg/dL (04-04 @ 17:09)    Triglycerides, Serum: 295 mg/dL (04-04-24 @ 07:09)    4/5/24: EXTENSIVE Conversation with pt daughter Marysol Spann over phone ([phone]). Explained to pt daughter pts calorie and protein needs, current tube feeding formula and amount of calories and protein it is providing, along with rate and total volume. Pt daughter requesting a more concentrated formula so that patient can be off the tube feed for a few hours daily. At home, pt on [employer] Glucose Support 80ml/hr x 10-15 hours daily. Per daughter, pt previously tolerating [employer] Peptide 1.5 without issue.     Daughter agreeable to trial Casie [facility] Peptide 1.5. [employer] Peptide has a low glycemic index and no non-nutritive or artificial sweeteners. Daughter requesting to run tube feed for less than 20 hours to allow patient time off of the pump. Can trial 60ml/hr x 16 hours to provide a Goal volume of 960ml, 1476kcal (27kcal/kg), 71g protein (1.3g/kg) and 673ml free water. Defer free water flush to team. Defer insulin regimen to team.   Continue Alfred twice daily per team to aid in wound healing. Continue Multivitamin and Vitamin C for same.     Spoke to BERNA Asher with recommendations.     Rufina Morris, MS, RD, CDN / Teams

## 2024-04-06 LAB
ALBUMIN SERPL ELPH-MCNC: 2.9 G/DL — LOW (ref 3.3–5)
ALP SERPL-CCNC: 44 U/L — SIGNIFICANT CHANGE UP (ref 40–120)
ALT FLD-CCNC: 21 U/L — SIGNIFICANT CHANGE UP (ref 10–45)
ANION GAP SERPL CALC-SCNC: 14 MMOL/L — SIGNIFICANT CHANGE UP (ref 5–17)
AST SERPL-CCNC: 17 U/L — SIGNIFICANT CHANGE UP (ref 10–40)
BASOPHILS # BLD AUTO: 0.01 K/UL — SIGNIFICANT CHANGE UP (ref 0–0.2)
BASOPHILS NFR BLD AUTO: 0.2 % — SIGNIFICANT CHANGE UP (ref 0–2)
BILIRUB SERPL-MCNC: 0.4 MG/DL — SIGNIFICANT CHANGE UP (ref 0.2–1.2)
BUN SERPL-MCNC: 7 MG/DL — SIGNIFICANT CHANGE UP (ref 7–23)
CALCIUM SERPL-MCNC: 8.8 MG/DL — SIGNIFICANT CHANGE UP (ref 8.4–10.5)
CHLORIDE SERPL-SCNC: 107 MMOL/L — SIGNIFICANT CHANGE UP (ref 96–108)
CO2 SERPL-SCNC: 21 MMOL/L — LOW (ref 22–31)
CREAT SERPL-MCNC: <0.3 MG/DL — LOW (ref 0.5–1.3)
EGFR: 113 ML/MIN/1.73M2 — SIGNIFICANT CHANGE UP
EOSINOPHIL # BLD AUTO: 0.02 K/UL — SIGNIFICANT CHANGE UP (ref 0–0.5)
EOSINOPHIL NFR BLD AUTO: 0.4 % — SIGNIFICANT CHANGE UP (ref 0–6)
GLUCOSE BLDC GLUCOMTR-MCNC: 121 MG/DL — HIGH (ref 70–99)
GLUCOSE BLDC GLUCOMTR-MCNC: 161 MG/DL — HIGH (ref 70–99)
GLUCOSE BLDC GLUCOMTR-MCNC: 163 MG/DL — HIGH (ref 70–99)
GLUCOSE BLDC GLUCOMTR-MCNC: 164 MG/DL — HIGH (ref 70–99)
GLUCOSE BLDC GLUCOMTR-MCNC: 262 MG/DL — HIGH (ref 70–99)
GLUCOSE SERPL-MCNC: 216 MG/DL — HIGH (ref 70–99)
HCT VFR BLD CALC: 27.4 % — LOW (ref 34.5–45)
HGB BLD-MCNC: 7.9 G/DL — LOW (ref 11.5–15.5)
IMM GRANULOCYTES NFR BLD AUTO: 0.9 % — SIGNIFICANT CHANGE UP (ref 0–0.9)
LYMPHOCYTES # BLD AUTO: 1.41 K/UL — SIGNIFICANT CHANGE UP (ref 1–3.3)
LYMPHOCYTES # BLD AUTO: 30.7 % — SIGNIFICANT CHANGE UP (ref 13–44)
MAGNESIUM SERPL-MCNC: 2 MG/DL — SIGNIFICANT CHANGE UP (ref 1.6–2.6)
MCHC RBC-ENTMCNC: 24.8 PG — LOW (ref 27–34)
MCHC RBC-ENTMCNC: 28.8 GM/DL — LOW (ref 32–36)
MCV RBC AUTO: 85.9 FL — SIGNIFICANT CHANGE UP (ref 80–100)
MONOCYTES # BLD AUTO: 0.27 K/UL — SIGNIFICANT CHANGE UP (ref 0–0.9)
MONOCYTES NFR BLD AUTO: 5.9 % — SIGNIFICANT CHANGE UP (ref 2–14)
NEUTROPHILS # BLD AUTO: 2.84 K/UL — SIGNIFICANT CHANGE UP (ref 1.8–7.4)
NEUTROPHILS NFR BLD AUTO: 61.9 % — SIGNIFICANT CHANGE UP (ref 43–77)
NRBC # BLD: 0 /100 WBCS — SIGNIFICANT CHANGE UP (ref 0–0)
PHOSPHATE SERPL-MCNC: 2.1 MG/DL — LOW (ref 2.5–4.5)
PLATELET # BLD AUTO: 109 K/UL — LOW (ref 150–400)
POTASSIUM SERPL-MCNC: 2.9 MMOL/L — CRITICAL LOW (ref 3.5–5.3)
POTASSIUM SERPL-SCNC: 2.9 MMOL/L — CRITICAL LOW (ref 3.5–5.3)
PROT SERPL-MCNC: 6.6 G/DL — SIGNIFICANT CHANGE UP (ref 6–8.3)
RBC # BLD: 3.19 M/UL — LOW (ref 3.8–5.2)
RBC # FLD: 18.3 % — HIGH (ref 10.3–14.5)
SODIUM SERPL-SCNC: 142 MMOL/L — SIGNIFICANT CHANGE UP (ref 135–145)
WBC # BLD: 4.59 K/UL — SIGNIFICANT CHANGE UP (ref 3.8–10.5)
WBC # FLD AUTO: 4.59 K/UL — SIGNIFICANT CHANGE UP (ref 3.8–10.5)

## 2024-04-06 PROCEDURE — 99233 SBSQ HOSP IP/OBS HIGH 50: CPT | Mod: FS

## 2024-04-06 RX ORDER — POTASSIUM CHLORIDE 20 MEQ
40 PACKET (EA) ORAL EVERY 6 HOURS
Refills: 0 | Status: COMPLETED | OUTPATIENT
Start: 2024-04-06 | End: 2024-04-06

## 2024-04-06 RX ORDER — POTASSIUM CHLORIDE 20 MEQ
10 PACKET (EA) ORAL
Refills: 0 | Status: COMPLETED | OUTPATIENT
Start: 2024-04-06 | End: 2024-04-06

## 2024-04-06 RX ADMIN — TRAMADOL HYDROCHLORIDE 25 MILLIGRAM(S): 50 TABLET ORAL at 15:47

## 2024-04-06 RX ADMIN — Medication 1000 MILLIGRAM(S): at 06:35

## 2024-04-06 RX ADMIN — Medication 1 TABLET(S): at 11:25

## 2024-04-06 RX ADMIN — LIDOCAINE 1 PATCH: 4 CREAM TOPICAL at 21:06

## 2024-04-06 RX ADMIN — Medication 2 DROP(S): at 17:45

## 2024-04-06 RX ADMIN — Medication 2 DROP(S): at 11:25

## 2024-04-06 RX ADMIN — CHLORHEXIDINE GLUCONATE 15 MILLILITER(S): 213 SOLUTION TOPICAL at 05:52

## 2024-04-06 RX ADMIN — Medication 2 DROP(S): at 05:52

## 2024-04-06 RX ADMIN — CHLORHEXIDINE GLUCONATE 15 MILLILITER(S): 213 SOLUTION TOPICAL at 17:48

## 2024-04-06 RX ADMIN — Medication 87.5 MICROGRAM(S): at 04:50

## 2024-04-06 RX ADMIN — CHLORHEXIDINE GLUCONATE 1 APPLICATION(S): 213 SOLUTION TOPICAL at 05:52

## 2024-04-06 RX ADMIN — Medication 3 MILLILITER(S): at 05:41

## 2024-04-06 RX ADMIN — Medication 5 MILLILITER(S): at 11:26

## 2024-04-06 RX ADMIN — PANTOPRAZOLE SODIUM 40 MILLIGRAM(S): 20 TABLET, DELAYED RELEASE ORAL at 04:50

## 2024-04-06 RX ADMIN — Medication 2 DROP(S): at 05:51

## 2024-04-06 RX ADMIN — Medication 3 MILLILITER(S): at 17:32

## 2024-04-06 RX ADMIN — LIDOCAINE 1 PATCH: 4 CREAM TOPICAL at 09:00

## 2024-04-06 RX ADMIN — AMLODIPINE BESYLATE 10 MILLIGRAM(S): 2.5 TABLET ORAL at 10:32

## 2024-04-06 RX ADMIN — SIMETHICONE 80 MILLIGRAM(S): 80 TABLET, CHEWABLE ORAL at 05:52

## 2024-04-06 RX ADMIN — SODIUM CHLORIDE 50 MILLILITER(S): 9 INJECTION, SOLUTION INTRAVENOUS at 11:26

## 2024-04-06 RX ADMIN — Medication 3: at 17:46

## 2024-04-06 RX ADMIN — LIDOCAINE 1 PATCH: 4 CREAM TOPICAL at 07:00

## 2024-04-06 RX ADMIN — ESCITALOPRAM OXALATE 10 MILLIGRAM(S): 10 TABLET, FILM COATED ORAL at 17:47

## 2024-04-06 RX ADMIN — Medication 3 MILLILITER(S): at 11:15

## 2024-04-06 RX ADMIN — Medication 5 MILLIGRAM(S): at 11:24

## 2024-04-06 RX ADMIN — LIDOCAINE 1 PATCH: 4 CREAM TOPICAL at 06:53

## 2024-04-06 RX ADMIN — INSULIN GLARGINE 7 UNIT(S): 100 INJECTION, SOLUTION SUBCUTANEOUS at 17:45

## 2024-04-06 RX ADMIN — LIDOCAINE 1 PATCH: 4 CREAM TOPICAL at 10:00

## 2024-04-06 RX ADMIN — Medication 5 MILLILITER(S): at 17:49

## 2024-04-06 RX ADMIN — Medication 40 MILLIEQUIVALENT(S): at 17:45

## 2024-04-06 RX ADMIN — Medication 2 DROP(S): at 17:47

## 2024-04-06 RX ADMIN — Medication 40 MILLIEQUIVALENT(S): at 12:21

## 2024-04-06 RX ADMIN — GABAPENTIN 100 MILLIGRAM(S): 400 CAPSULE ORAL at 21:07

## 2024-04-06 RX ADMIN — Medication 1 APPLICATION(S): at 17:45

## 2024-04-06 RX ADMIN — FIDAXOMICIN 200 MILLIGRAM(S): 200 GRANULE, FOR SUSPENSION ORAL at 05:51

## 2024-04-06 RX ADMIN — GABAPENTIN 100 MILLIGRAM(S): 400 CAPSULE ORAL at 10:31

## 2024-04-06 RX ADMIN — Medication 100 MILLIEQUIVALENT(S): at 11:24

## 2024-04-06 RX ADMIN — Medication 500 MILLIGRAM(S): at 11:25

## 2024-04-06 RX ADMIN — Medication 100 MILLIEQUIVALENT(S): at 10:11

## 2024-04-06 RX ADMIN — QUETIAPINE FUMARATE 12.5 MILLIGRAM(S): 200 TABLET, FILM COATED ORAL at 17:48

## 2024-04-06 RX ADMIN — Medication 1000 MILLIGRAM(S): at 05:59

## 2024-04-06 RX ADMIN — Medication 3 MILLILITER(S): at 23:44

## 2024-04-06 RX ADMIN — Medication 5 MILLILITER(S): at 05:52

## 2024-04-06 RX ADMIN — FIDAXOMICIN 200 MILLIGRAM(S): 200 GRANULE, FOR SUSPENSION ORAL at 17:47

## 2024-04-06 RX ADMIN — SIMETHICONE 80 MILLIGRAM(S): 80 TABLET, CHEWABLE ORAL at 17:47

## 2024-04-06 RX ADMIN — Medication 100 MILLIEQUIVALENT(S): at 12:20

## 2024-04-06 RX ADMIN — Medication 1: at 01:01

## 2024-04-06 RX ADMIN — Medication 1: at 05:50

## 2024-04-06 RX ADMIN — LIDOCAINE 1 PATCH: 4 CREAM TOPICAL at 21:07

## 2024-04-06 NOTE — PROGRESS NOTE ADULT - PROBLEM SELECTOR PLAN 2
Being treated for C diff as outpatient with follow up with Infectious disease, Dr Newman   - Will resume on Home fidaxomicin for 5 more days (total 10 day course)  - Infectious diease consult appreciated Being treated for C diff as outpatient with follow up with Infectious disease, Dr Newman   - Will resume on Home fidaxomicin for 5 more days (total 10 day course)  4/3 -->4/13  _ ID reccomends Zinvaya 50mg IV  to be given to reduce Cdiff infections  - Infectious diease consult appreciated

## 2024-04-06 NOTE — CONSULT NOTE ADULT - SUBJECTIVE AND OBJECTIVE BOX
Vascular & Interventional Radiology Consult Note    Evaluate for Procedure: G tube adjustment    HPI: 74y Female with T2DM on insulin, HTN, HLD, hypothyroidism, RA on Prednisone, Fibromyalgia, cerebral aneurysm s/p repair, chronic hypercapnic respiratory failure s/p trach (vent dependent) and Chronic PEG 2022 , recurrent C.diff infection (follows with Dr. Newman) , bedbound who presented from home with complaints of possible G tube dislodgement. 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 patient with reported continued leakage of TF.     Allergies: pineapple (Unknown)  Tagamet (Unknown)  heparin (Unknown)  metronidazole (Rash)  penicillin (Unknown)  Lyrica (Unknown)    Medications (Abx/Cardiac/Anticoagulation/Blood Products)  amLODIPine   Tablet: 10 milliGRAM(s) Oral (04-06 @ 10:32)  amLODIPine   Tablet: 10 milliGRAM(s) Oral (04-04 @ 12:58)  fidaxomicin: 200 milliGRAM(s) Oral (04-06 @ 05:51)    Data:    T(C): 37.9  HR: 81  BP: 169/66  RR: 26  SpO2: 100%    -WBC 4.59 / HgB 7.9 / Hct 27.4 / Plt 109  -Na 142 / Cl 107 / BUN 7 / Glucose 216  -K 2.9 / CO2 21 / Cr <0.30  -ALT 21 / Alk Phos 44 / T.Bili 0.4  -INR 1.36 / PTT 30.3      Imaging: CT A/P reviewed

## 2024-04-06 NOTE — PROGRESS NOTE ADULT - NS ATTEND AMEND GEN_ALL_CORE FT
--------------------- 74 year old female with diabetes, hypertension, hyperlipidemia, hypothyroidism, RA on Prednisone, fibromyalgia, cerebral aneurysm s/p repair, chronic hypercapnic respiratory failure s/p trach (vent dependent) and PEG, recurrent C.diff infection (follows with Dr. Newman) who presented with dislodged PEG.       #Dislodged PEG  She is s/p bedside replacement by IR and appears to be in appropriate position. Will monitor for leakage. 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 If this is the case, will need to d/w Surgery regarding replacing.    #C.Diff  Will plan to continue Dificid as per ID. Appreciate input with this case    #Sacral wound: Pending wound care evaluation    #Fever: Isolated fever 4/4. No other localizing symptoms, no leukocytosis. Discussed with ID team, who recommended cultures and holding antibiotics.   Rest as above.

## 2024-04-06 NOTE — PROGRESS NOTE ADULT - SUBJECTIVE AND OBJECTIVE BOX
Patient is a 74y old  Female who presents with a chief complaint of Dislodged G Tube (05 Apr 2024 18:22)      Interval Events:    REVIEW OF SYSTEMS:  [ ] Positive  [ ] All other systems negative  [ ] Unable to assess ROS because ________    Vital Signs Last 24 Hrs  T(C): 37.9 (04-06-24 @ 06:00), Max: 37.9 (04-06-24 @ 06:00)  T(F): 100.2 (04-06-24 @ 06:00), Max: 100.2 (04-06-24 @ 06:00)  HR: 95 (04-06-24 @ 06:00) (80 - 105)  BP: 169/66 (04-06-24 @ 06:00) (121/50 - 169/66)  RR: 20 (04-06-24 @ 06:00) (20 - 20)  SpO2: 99% (04-06-24 @ 06:00) (98% - 100%)    PHYSICAL EXAM:  HEENT:   [ ]Tracheostomy:  [ ]Pupils equal  [ ]No oral lesions  [ ]Abnormal    SKIN  [ ]No Rash  [ ] Abnormal  [ ] pressure    CARDIAC  [ ]Regular  [ ]Abnormal    PULMONARY  [ ]Bilateral Clear Breath Sounds  [ ]Normal Excursion  [ ]Abnormal    GI  [ ]PEG      [ ] +BS		              [ ]Soft, nondistended, nontender	  [ ]Abnormal    MUSCULOSKELETAL                                   [ ]Bedbound                 [ ]Abnormal    [ ]Ambulatory/OOB to chair                           EXTREMITIES                                         [ ]Normal  [ ]Edema                           NEUROLOGIC  [ ] Normal, non focal  [ ] Focal findings:    PSYCHIATRIC  [ ]Alert and appropriate  [ ] Sedated	 [ ]Agitated    :  Mcbride: [ ] Yes, if yes: Date of Placement:                   [  ] No    LINES: Central Lines [ ] Yes, if yes: Date of Placement                                     [  ] No    HOSPITAL MEDICATIONS:  MEDICATIONS  (STANDING):  albuterol/ipratropium for Nebulization 3 milliLiter(s) Nebulizer every 6 hours  amLODIPine   Tablet 10 milliGRAM(s) Oral <User Schedule>  artificial tears (preservative free) Ophthalmic Solution 2 Drop(s) Both EYES every 6 hours  ascorbic acid 500 milliGRAM(s) Oral daily  Biotene Dry Mouth Oral Rinse 5 milliLiter(s) Swish and Spit every 6 hours  chlorhexidine 0.12% Liquid 15 milliLiter(s) Oral Mucosa every 12 hours  chlorhexidine 4% Liquid 1 Application(s) Topical <User Schedule>  escitalopram 10 milliGRAM(s) Oral <User Schedule>  fentaNYL   Patch  25 MICROgram(s)/Hr 1 Patch Transdermal every 72 hours  fidaxomicin 200 milliGRAM(s) Oral two times a day  gabapentin Solution 100 milliGRAM(s) Oral <User Schedule>  insulin glargine Injectable (LANTUS) 7 Unit(s) SubCutaneous <User Schedule>  insulin lispro (ADMELOG) corrective regimen sliding scale   SubCutaneous every 6 hours  lactated ringers. 1000 milliLiter(s) (50 mL/Hr) IV Continuous <Continuous>  levothyroxine Injectable 87.5 MICROGram(s) IV Push <User Schedule>  lidocaine   4% Patch 1 Patch Transdermal at bedtime  lidocaine   4% Patch 1 Patch Transdermal at bedtime  melatonin Liquid 3 milliGRAM(s) Oral at bedtime  multivitamin 1 Tablet(s) Oral daily  pantoprazole  Injectable 40 milliGRAM(s) IV Push <User Schedule>  predniSONE  Solution 5 milliGRAM(s) Oral <User Schedule>  QUEtiapine 12.5 milliGRAM(s) Oral <User Schedule>  simethicone 80 milliGRAM(s) Chew every 12 hours  tobramycin 0.3% Ophthalmic Solution 2 Drop(s) Both EYES every 6 hours    MEDICATIONS  (PRN):  acetaminophen   Oral Liquid .. 1000 milliGRAM(s) Oral every 6 hours PRN Temp greater or equal to 38C (100.4F), Mild Pain (1 - 3)  ondansetron Injectable 4 milliGRAM(s) IV Push every 8 hours PRN Nausea and/or Vomiting  petrolatum Ophthalmic Ointment 1 Application(s) Both EYES every 6 hours PRN eye dryness  sodium chloride 0.65% Nasal 1 Spray(s) Both Nostrils every 12 hours PRN Congestion  traMADol 25 milliGRAM(s) Oral every 8 hours PRN Mild Pain (1 - 3)      LABS:                           Venous Blood Gas:  04-04 @ 07:17  7.29/55/34/26/54.1  VBG Lactate: 2.0    CAPILLARY BLOOD GLUCOSE    MICROBIOLOGY:     RADIOLOGY:  [ ] Reviewed and interpreted by me    Mode: AC/ CMV (Assist Control/ Continuous Mandatory Ventilation)  RR (machine): 12  TV (machine): 300  FiO2: 30  PEEP: 5  ITime: 1  MAP: 7  PIP: 24   Patient is a 74y old  Female who presents with a chief complaint of Dislodged G Tube (05 Apr 2024 18:22)      Interval Events: No events over night.  Tmax 100.2F    REVIEW OF SYSTEMS:  [ ] Positive  [X] All other systems negative:  Denies having pain during exam, requested to be suctioned  [ ] Unable to assess ROS because _____    Vital Signs Last 24 Hrs  T(C): 37.9 (04-06-24 @ 06:00), Max: 37.9 (04-06-24 @ 06:00)  T(F): 100.2 (04-06-24 @ 06:00), Max: 100.2 (04-06-24 @ 06:00)  HR: 95 (04-06-24 @ 06:00) (80 - 105)  BP: 169/66 (04-06-24 @ 06:00) (121/50 - 169/66)  RR: 20 (04-06-24 @ 06:00) (20 - 20)  SpO2: 99% (04-06-24 @ 06:00) (98% - 100%)      PHYSICAL EXAM:  HEENT:   [X] Tracheostomy:  #8 distal XLT Cuffed Shiley  [X] PERRL B/L; EOMI  [X] No oral lesions  [ ] Abnormal    SKIN  [ ] No Rash  [ ] Abnormal  [X] pressure: B/L buttocks/sacral deep tissue injury     CARDIAC  [X] Regular  [ ]Abnormal    PULMONARY  [X] Bilateral Clear Breath Sounds  [ ] Normal Excursion  [ ] Abnormal    GI  [X] PEG      [X] +BS		              [X] Soft, nondistended, nontender	  [ ] Abnormal    MUSCULOSKELETAL                                   [X] Bedbound                 [ ] Abnormal    [ ] Ambulatory/OOB to chair                           EXTREMITIES                                         [X] Normal  [ ]Edema                           NEUROLOGIC  [ ] Normal, non focal  [X] Focal findings:  Alert and Oriented x3; responds to questions by mouthing words; +gag reflex/cough; no facial asymmetry observed; moves all distal limbs upon request; B/L plantar flexion    PSYCHIATRIC  [X] Alert and appropriate  [ ] Sedated	 [ ]Agitated    :  Mcbride: [ ] Yes, if yes: Date of Placement:                   [X ] No    LINES: Central Lines [ ] Yes, if yes: Date of Placement                                     [X ] No      HOSPITAL MEDICATIONS:  MEDICATIONS  (STANDING):  albuterol/ipratropium for Nebulization 3 milliLiter(s) Nebulizer every 6 hours  amLODIPine   Tablet 10 milliGRAM(s) Oral <User Schedule>  artificial tears (preservative free) Ophthalmic Solution 2 Drop(s) Both EYES every 6 hours  ascorbic acid 500 milliGRAM(s) Oral daily  Biotene Dry Mouth Oral Rinse 5 milliLiter(s) Swish and Spit every 6 hours  chlorhexidine 0.12% Liquid 15 milliLiter(s) Oral Mucosa every 12 hours  chlorhexidine 4% Liquid 1 Application(s) Topical <User Schedule>  escitalopram 10 milliGRAM(s) Oral <User Schedule>  fentaNYL   Patch  25 MICROgram(s)/Hr 1 Patch Transdermal every 72 hours  fidaxomicin 200 milliGRAM(s) Oral two times a day  gabapentin Solution 100 milliGRAM(s) Oral <User Schedule>  insulin glargine Injectable (LANTUS) 7 Unit(s) SubCutaneous <User Schedule>  insulin lispro (ADMELOG) corrective regimen sliding scale   SubCutaneous every 6 hours  lactated ringers. 1000 milliLiter(s) (50 mL/Hr) IV Continuous <Continuous>  levothyroxine Injectable 87.5 MICROGram(s) IV Push <User Schedule>  lidocaine   4% Patch 1 Patch Transdermal at bedtime  lidocaine   4% Patch 1 Patch Transdermal at bedtime  melatonin Liquid 3 milliGRAM(s) Oral at bedtime  multivitamin 1 Tablet(s) Oral daily  pantoprazole  Injectable 40 milliGRAM(s) IV Push <User Schedule>  predniSONE  Solution 5 milliGRAM(s) Oral <User Schedule>  QUEtiapine 12.5 milliGRAM(s) Oral <User Schedule>  simethicone 80 milliGRAM(s) Chew every 12 hours  tobramycin 0.3% Ophthalmic Solution 2 Drop(s) Both EYES every 6 hours    MEDICATIONS  (PRN):  acetaminophen   Oral Liquid .. 1000 milliGRAM(s) Oral every 6 hours PRN Temp greater or equal to 38C (100.4F), Mild Pain (1 - 3)  ondansetron Injectable 4 milliGRAM(s) IV Push every 8 hours PRN Nausea and/or Vomiting  petrolatum Ophthalmic Ointment 1 Application(s) Both EYES every 6 hours PRN eye dryness  sodium chloride 0.65% Nasal 1 Spray(s) Both Nostrils every 12 hours PRN Congestion  traMADol 25 milliGRAM(s) Oral every 8 hours PRN Mild Pain (1 - 3)      LABS:                           7.9    4.59  )-----------( 109      ( 06 Apr 2024 07:36 )             27.4     04-06    142  |  107  |  7   ----------------------------<  216<H>  2.9<LL>   |  21<L>  |  <0.30<L>    Ca    8.8      06 Apr 2024 07:36  Phos  2.1     04-06  Mg     2.0     04-06    TPro  6.6  /  Alb  2.9<L>  /  TBili  0.4  /  DBili  x   /  AST  17  /  ALT  21  /  AlkPhos  44  04-06                          Venous Blood Gas:  04-04 @ 07:17  7.29/55/34/26/54.1  VBG Lactate: 2.0    CAPILLARY BLOOD GLUCOSE    MICROBIOLOGY:     RADIOLOGY:  [ ] Reviewed and interpreted by me    Mode: AC/ CMV (Assist Control/ Continuous Mandatory Ventilation)  RR (machine): 12  TV (machine): 300  FiO2: 30  PEEP: 5  ITime: 1  MAP: 7  PIP: 24

## 2024-04-06 NOTE — PROGRESS NOTE ADULT - PROBLEM SELECTOR PLAN 1
PEG tube dislodgement on admission   - S/p CT abd/pelvis showing dislodgement of G tube with balloon in the anterior abdominal wall with air filled track to stomach.  - initially GI and Surgery consulted   - S/p bedside exchange by IR on 4/3- # 20 Fr Kangaroo EnFit placed   - 4/4:PEG leaking.  Feeds held.  IR adjusted PEG at bedside. G-tube study pending to exclude extravasation. Patient remains NPO except for meds, on IVF, LR at 50ml/hr. PEG tube dislodgement on admission   - S/p CT abd/pelvis showing dislodgement of G tube with balloon in the anterior abdominal wall with air filled track to stomach.  - initially GI and Surgery consulted   - S/p bedside exchange by IR on 4/3- # 20 Fr Kangaroo EnFit placed   - 4/4:PEG leaking.  Feeds held.  IR adjusted PEG at bedside. G-tube study pending to exclude extravasation. Patient remains NPO except for meds, on IVF, LR at 50ml/hr.  - 4/5: PEG still leaking, adjusted at bedisde by IR Attending.  - 4/6: PEG still leaking, IR informed, planned for PEG revision on 4/8 or 4/9.  PEG feeds ongoing at reduced rate as tolerated.

## 2024-04-06 NOTE — CONSULT NOTE ADULT - ASSESSMENT
Assessment: 74y Female with chronic G tube noted to have buried bumper on 4/2 with bedside adjustment by IR 4/4 now with continued leaking of TF.    Plan: IR to tentatively downsize G tube and suture in place to allow for scar tissue growth and then future re-upsizing Monday vs Tuesday 4/8 vs 4/9 pending scheduling availability  -Please place order for IR Procedure, approving attending Dr. Peacock  -NPO/hold TF past midnight prior to procedure (will f/u with team Monday regarding timing of procedure 4/8 vs 4/9)  -hold DVT ppx  -AM CBC, BMP, and coags  -IR Pre-procedure note  -plan reviewed with IR attending Dr. Peacock  -discussed with primary team    Carlos Wood MD  PGY-VI, Interventional Radiology Integrated Resident    -Available on [employer] TEAMS  -Emergent issues: [facility]-[phone]; [facility]-p.[phone] ([phone])  -Non-emergent consults: Please place a [facility] order "IR Consult" with an appropriate callback number  -Scheduling questions: [facility]: [phone]; [facility]: [phone]  -Clinic/Outpatient booking: [facility]: [phone]; [facility]: [phone]

## 2024-04-06 NOTE — PROGRESS NOTE ADULT - TIME BILLING
Review of patient records, including history, laboratory data, imaging. Patient evaluation and assessment. Coordination of care. Time excludes teaching.

## 2024-04-06 NOTE — PROGRESS NOTE ADULT - ASSESSMENT
75 yo F PMH T2DM on insulin, HTN, HLD, hypothyroidism, RA on Prednisone, Fibromyalgia, cerebral aneurysm s/p repair, chronic hypercapnic respiratory failure s/p trach (vent dependent) and Chronic PEG 2022 , recurrent C.diff infection (follows with Dr. Newman) , bedbound who presented from home with complaints of possible G tube dislodgement and redness around the site. Had CT Abdomen/Pelvis done showing dislodgement of G tube with balloon in the anterior abdominal wall with air filled track to stomach. Admitted to MICU for ventilator management and given vancomycin/ meropenum empirically for treatment of cellulitis. Per family patient has had Known c diff infection for past 2-3 weeks. Was being treated by Dr. Zion Newman, and currently receiving home Fidaxomicin.  Also report recent UTI--not currently being treated. Patient was consulted by ID, GI, Surgery and IR in MICU. Transferred to RCU 4/3.    4/3: Transferred from MICU, HD Stable, and stable on Full Vent Support settings. Received bedside peg exchange by IR today. G tube XR study confirmed peg in stomach. IR cleared for use of tube feeds/meds. ID restarted patient on home Fidaxomicin oral bid x 5 days. Wound care and Nutrition to f/u in am. Will likely proceed with discharge planning back to home w/ home services to be reinstated.   4/4: PEG leaked yesterday following replacement.  Tube feeds placed on hold.  IR attempted reposition of PEG at bedside followed G-tube study.  Awaiting confirmation whether tube can be used for feeds.  Patient also with fever 101.4F this afternoon.  ID made aware, will obtain blood and sputum cultures and continue to monitor off antibiotics.  4/5: Persistent leak from peg reported by RN staff but lessening (minimal) s/p repositioning by IR. 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 S/p isolated Fever 4/5, cultures pending. Afebrile overnight, however with Hypotension SBP to 80's- improving s/p IV Hydration. Procal elevated, however no Leukocytosis. IF respike will need to reconsult ID and start empiric Abx therapy.   75 yo F PMH T2DM on insulin, HTN, HLD, hypothyroidism, RA on Prednisone, Fibromyalgia, cerebral aneurysm s/p repair, chronic hypercapnic respiratory failure s/p trach (vent dependent) and Chronic PEG 2022 , recurrent C.diff infection (follows with Dr. Newman) , bedbound who presented from home with complaints of possible G tube dislodgement and redness around the site. Had CT Abdomen/Pelvis done showing dislodgement of G tube with balloon in the anterior abdominal wall with air filled track to stomach. Admitted to MICU for ventilator management and given vancomycin/ meropenum empirically for treatment of cellulitis. Per family patient has had Known c diff infection for past 2-3 weeks.  Treating with Fidaxomicin.  IR team exchanged PEG on 4/3 however later in the day it remained with leakage.  IR Attending adjusted PEG at bedside on 4/4 and PEG remained with moderate amount of PEG leakage once feeds initiated at reduced rate.     4/6: PEG continued to have moderate leakage with feeds going, unable to tolerate goal rate.  IR informed, planning for PEG revision on 4/8 or 4/9.  WIll continue PEG feeds at reduced rate as tolerated. Daughter, Marysol, updated. Potassium 2.9, to be repleted.

## 2024-04-07 DIAGNOSIS — R50.9 FEVER, UNSPECIFIED: ICD-10-CM

## 2024-04-07 LAB
ALBUMIN SERPL ELPH-MCNC: 3.1 G/DL — LOW (ref 3.3–5)
ALP SERPL-CCNC: 47 U/L — SIGNIFICANT CHANGE UP (ref 40–120)
ALT FLD-CCNC: 21 U/L — SIGNIFICANT CHANGE UP (ref 10–45)
ANION GAP SERPL CALC-SCNC: 11 MMOL/L — SIGNIFICANT CHANGE UP (ref 5–17)
APPEARANCE UR: CLEAR — SIGNIFICANT CHANGE UP
AST SERPL-CCNC: 21 U/L — SIGNIFICANT CHANGE UP (ref 10–40)
BACTERIA # UR AUTO: ABNORMAL /HPF
BASOPHILS # BLD AUTO: 0.01 K/UL — SIGNIFICANT CHANGE UP (ref 0–0.2)
BASOPHILS NFR BLD AUTO: 0.2 % — SIGNIFICANT CHANGE UP (ref 0–2)
BILIRUB SERPL-MCNC: 0.4 MG/DL — SIGNIFICANT CHANGE UP (ref 0.2–1.2)
BILIRUB UR-MCNC: NEGATIVE — SIGNIFICANT CHANGE UP
BUN SERPL-MCNC: 8 MG/DL — SIGNIFICANT CHANGE UP (ref 7–23)
CALCIUM SERPL-MCNC: 8.7 MG/DL — SIGNIFICANT CHANGE UP (ref 8.4–10.5)
CAST: 1 /LPF — SIGNIFICANT CHANGE UP (ref 0–4)
CHLORIDE SERPL-SCNC: 102 MMOL/L — SIGNIFICANT CHANGE UP (ref 96–108)
CO2 SERPL-SCNC: 25 MMOL/L — SIGNIFICANT CHANGE UP (ref 22–31)
COLOR SPEC: YELLOW — SIGNIFICANT CHANGE UP
CREAT SERPL-MCNC: <0.3 MG/DL — LOW (ref 0.5–1.3)
DIFF PNL FLD: ABNORMAL
EGFR: 113 ML/MIN/1.73M2 — SIGNIFICANT CHANGE UP
EOSINOPHIL # BLD AUTO: 0.02 K/UL — SIGNIFICANT CHANGE UP (ref 0–0.5)
EOSINOPHIL NFR BLD AUTO: 0.4 % — SIGNIFICANT CHANGE UP (ref 0–6)
FLUAV AG NPH QL: SIGNIFICANT CHANGE UP
FLUBV AG NPH QL: SIGNIFICANT CHANGE UP
GLUCOSE BLDC GLUCOMTR-MCNC: 179 MG/DL — HIGH (ref 70–99)
GLUCOSE BLDC GLUCOMTR-MCNC: 183 MG/DL — HIGH (ref 70–99)
GLUCOSE BLDC GLUCOMTR-MCNC: 209 MG/DL — HIGH (ref 70–99)
GLUCOSE BLDC GLUCOMTR-MCNC: 308 MG/DL — HIGH (ref 70–99)
GLUCOSE BLDC GLUCOMTR-MCNC: 343 MG/DL — HIGH (ref 70–99)
GLUCOSE SERPL-MCNC: 206 MG/DL — HIGH (ref 70–99)
GLUCOSE UR QL: NEGATIVE MG/DL — SIGNIFICANT CHANGE UP
GRAM STN FLD: ABNORMAL
HCT VFR BLD CALC: 27.4 % — LOW (ref 34.5–45)
HGB BLD-MCNC: 7.8 G/DL — LOW (ref 11.5–15.5)
IMM GRANULOCYTES NFR BLD AUTO: 2.6 % — HIGH (ref 0–0.9)
KETONES UR-MCNC: ABNORMAL MG/DL
LEUKOCYTE ESTERASE UR-ACNC: NEGATIVE — SIGNIFICANT CHANGE UP
LYMPHOCYTES # BLD AUTO: 1.41 K/UL — SIGNIFICANT CHANGE UP (ref 1–3.3)
LYMPHOCYTES # BLD AUTO: 25.8 % — SIGNIFICANT CHANGE UP (ref 13–44)
MAGNESIUM SERPL-MCNC: 1.6 MG/DL — SIGNIFICANT CHANGE UP (ref 1.6–2.6)
MCHC RBC-ENTMCNC: 24.7 PG — LOW (ref 27–34)
MCHC RBC-ENTMCNC: 28.5 GM/DL — LOW (ref 32–36)
MCV RBC AUTO: 86.7 FL — SIGNIFICANT CHANGE UP (ref 80–100)
MONOCYTES # BLD AUTO: 0.33 K/UL — SIGNIFICANT CHANGE UP (ref 0–0.9)
MONOCYTES NFR BLD AUTO: 6 % — SIGNIFICANT CHANGE UP (ref 2–14)
NEUTROPHILS # BLD AUTO: 3.56 K/UL — SIGNIFICANT CHANGE UP (ref 1.8–7.4)
NEUTROPHILS NFR BLD AUTO: 65 % — SIGNIFICANT CHANGE UP (ref 43–77)
NITRITE UR-MCNC: NEGATIVE — SIGNIFICANT CHANGE UP
NRBC # BLD: 0 /100 WBCS — SIGNIFICANT CHANGE UP (ref 0–0)
PH UR: 7.5 — SIGNIFICANT CHANGE UP (ref 5–8)
PHOSPHATE SERPL-MCNC: 2 MG/DL — LOW (ref 2.5–4.5)
PLATELET # BLD AUTO: 105 K/UL — LOW (ref 150–400)
POTASSIUM SERPL-MCNC: 3.9 MMOL/L — SIGNIFICANT CHANGE UP (ref 3.5–5.3)
POTASSIUM SERPL-SCNC: 3.9 MMOL/L — SIGNIFICANT CHANGE UP (ref 3.5–5.3)
PROT SERPL-MCNC: 6.8 G/DL — SIGNIFICANT CHANGE UP (ref 6–8.3)
PROT UR-MCNC: NEGATIVE MG/DL — SIGNIFICANT CHANGE UP
RBC # BLD: 3.16 M/UL — LOW (ref 3.8–5.2)
RBC # FLD: 18.7 % — HIGH (ref 10.3–14.5)
RBC CASTS # UR COMP ASSIST: 20 /HPF — HIGH (ref 0–4)
RSV RNA NPH QL NAA+NON-PROBE: SIGNIFICANT CHANGE UP
SARS-COV-2 RNA SPEC QL NAA+PROBE: SIGNIFICANT CHANGE UP
SODIUM SERPL-SCNC: 138 MMOL/L — SIGNIFICANT CHANGE UP (ref 135–145)
SP GR SPEC: 1.01 — SIGNIFICANT CHANGE UP (ref 1–1.03)
SPECIMEN SOURCE: SIGNIFICANT CHANGE UP
SQUAMOUS # UR AUTO: 1 /HPF — SIGNIFICANT CHANGE UP (ref 0–5)
UROBILINOGEN FLD QL: 0.2 MG/DL — SIGNIFICANT CHANGE UP (ref 0.2–1)
WBC # BLD: 5.47 K/UL — SIGNIFICANT CHANGE UP (ref 3.8–10.5)
WBC # FLD AUTO: 5.47 K/UL — SIGNIFICANT CHANGE UP (ref 3.8–10.5)
WBC UR QL: 1 /HPF — SIGNIFICANT CHANGE UP (ref 0–5)

## 2024-04-07 PROCEDURE — 99232 SBSQ HOSP IP/OBS MODERATE 35: CPT

## 2024-04-07 PROCEDURE — 93970 EXTREMITY STUDY: CPT | Mod: 26,59

## 2024-04-07 PROCEDURE — 71045 X-RAY EXAM CHEST 1 VIEW: CPT | Mod: 26

## 2024-04-07 PROCEDURE — 99233 SBSQ HOSP IP/OBS HIGH 50: CPT | Mod: FS

## 2024-04-07 RX ORDER — DIPHENHYDRAMINE HCL 50 MG
25 CAPSULE ORAL ONCE
Refills: 0 | Status: DISCONTINUED | OUTPATIENT
Start: 2024-04-07 | End: 2024-04-07

## 2024-04-07 RX ORDER — DIPHENHYDRAMINE HCL 50 MG
15 CAPSULE ORAL ONCE
Refills: 0 | Status: COMPLETED | OUTPATIENT
Start: 2024-04-07 | End: 2024-04-07

## 2024-04-07 RX ORDER — MEROPENEM 1 G/30ML
INJECTION INTRAVENOUS
Refills: 0 | Status: DISCONTINUED | OUTPATIENT
Start: 2024-04-07 | End: 2024-04-11

## 2024-04-07 RX ORDER — MEROPENEM 1 G/30ML
1000 INJECTION INTRAVENOUS ONCE
Refills: 0 | Status: COMPLETED | OUTPATIENT
Start: 2024-04-07 | End: 2024-04-07

## 2024-04-07 RX ORDER — MAGNESIUM SULFATE 500 MG/ML
2 VIAL (ML) INJECTION ONCE
Refills: 0 | Status: COMPLETED | OUTPATIENT
Start: 2024-04-07 | End: 2024-04-07

## 2024-04-07 RX ORDER — MEROPENEM 1 G/30ML
1000 INJECTION INTRAVENOUS EVERY 8 HOURS
Refills: 0 | Status: DISCONTINUED | OUTPATIENT
Start: 2024-04-07 | End: 2024-04-11

## 2024-04-07 RX ADMIN — LIDOCAINE 1 PATCH: 4 CREAM TOPICAL at 10:00

## 2024-04-07 RX ADMIN — LIDOCAINE 1 PATCH: 4 CREAM TOPICAL at 07:54

## 2024-04-07 RX ADMIN — Medication 1 TABLET(S): at 12:55

## 2024-04-07 RX ADMIN — Medication 5 MILLILITER(S): at 06:13

## 2024-04-07 RX ADMIN — Medication 1000 MILLIGRAM(S): at 06:32

## 2024-04-07 RX ADMIN — SIMETHICONE 80 MILLIGRAM(S): 80 TABLET, CHEWABLE ORAL at 19:54

## 2024-04-07 RX ADMIN — Medication 2 DROP(S): at 00:09

## 2024-04-07 RX ADMIN — Medication 15 MILLIGRAM(S): at 22:03

## 2024-04-07 RX ADMIN — CHLORHEXIDINE GLUCONATE 15 MILLILITER(S): 213 SOLUTION TOPICAL at 06:13

## 2024-04-07 RX ADMIN — Medication 2: at 06:15

## 2024-04-07 RX ADMIN — MEROPENEM 100 MILLIGRAM(S): 1 INJECTION INTRAVENOUS at 22:05

## 2024-04-07 RX ADMIN — GABAPENTIN 100 MILLIGRAM(S): 400 CAPSULE ORAL at 09:12

## 2024-04-07 RX ADMIN — MEROPENEM 100 MILLIGRAM(S): 1 INJECTION INTRAVENOUS at 18:21

## 2024-04-07 RX ADMIN — Medication 2 DROP(S): at 18:16

## 2024-04-07 RX ADMIN — Medication 2 DROP(S): at 06:14

## 2024-04-07 RX ADMIN — SIMETHICONE 80 MILLIGRAM(S): 80 TABLET, CHEWABLE ORAL at 06:14

## 2024-04-07 RX ADMIN — INSULIN GLARGINE 7 UNIT(S): 100 INJECTION, SOLUTION SUBCUTANEOUS at 19:58

## 2024-04-07 RX ADMIN — Medication 5 MILLILITER(S): at 12:55

## 2024-04-07 RX ADMIN — CHLORHEXIDINE GLUCONATE 15 MILLILITER(S): 213 SOLUTION TOPICAL at 18:13

## 2024-04-07 RX ADMIN — Medication 3 MILLILITER(S): at 17:39

## 2024-04-07 RX ADMIN — Medication 1000 MILLIGRAM(S): at 06:12

## 2024-04-07 RX ADMIN — FIDAXOMICIN 200 MILLIGRAM(S): 200 GRANULE, FOR SUSPENSION ORAL at 18:14

## 2024-04-07 RX ADMIN — SODIUM CHLORIDE 50 MILLILITER(S): 9 INJECTION, SOLUTION INTRAVENOUS at 06:11

## 2024-04-07 RX ADMIN — Medication 5 MILLIGRAM(S): at 15:07

## 2024-04-07 RX ADMIN — ESCITALOPRAM OXALATE 10 MILLIGRAM(S): 10 TABLET, FILM COATED ORAL at 19:53

## 2024-04-07 RX ADMIN — Medication 3 MILLILITER(S): at 11:20

## 2024-04-07 RX ADMIN — FIDAXOMICIN 200 MILLIGRAM(S): 200 GRANULE, FOR SUSPENSION ORAL at 06:13

## 2024-04-07 RX ADMIN — SODIUM CHLORIDE 50 MILLILITER(S): 9 INJECTION, SOLUTION INTRAVENOUS at 09:11

## 2024-04-07 RX ADMIN — QUETIAPINE FUMARATE 12.5 MILLIGRAM(S): 200 TABLET, FILM COATED ORAL at 22:03

## 2024-04-07 RX ADMIN — Medication 25 GRAM(S): at 13:01

## 2024-04-07 RX ADMIN — TRAMADOL HYDROCHLORIDE 25 MILLIGRAM(S): 50 TABLET ORAL at 12:58

## 2024-04-07 RX ADMIN — Medication 4: at 18:16

## 2024-04-07 RX ADMIN — Medication 5 MILLILITER(S): at 18:13

## 2024-04-07 RX ADMIN — Medication 3 MILLILITER(S): at 05:52

## 2024-04-07 RX ADMIN — LIDOCAINE 1 PATCH: 4 CREAM TOPICAL at 22:04

## 2024-04-07 RX ADMIN — Medication 3 MILLIGRAM(S): at 22:17

## 2024-04-07 RX ADMIN — LIDOCAINE 1 PATCH: 4 CREAM TOPICAL at 06:55

## 2024-04-07 RX ADMIN — Medication 1: at 12:55

## 2024-04-07 RX ADMIN — Medication 2 DROP(S): at 18:14

## 2024-04-07 RX ADMIN — Medication 2 DROP(S): at 22:06

## 2024-04-07 RX ADMIN — Medication 1: at 00:10

## 2024-04-07 RX ADMIN — AMLODIPINE BESYLATE 10 MILLIGRAM(S): 2.5 TABLET ORAL at 09:12

## 2024-04-07 RX ADMIN — MEROPENEM 100 MILLIGRAM(S): 1 INJECTION INTRAVENOUS at 06:40

## 2024-04-07 RX ADMIN — Medication 87.5 MICROGRAM(S): at 06:11

## 2024-04-07 RX ADMIN — SODIUM CHLORIDE 50 MILLILITER(S): 9 INJECTION, SOLUTION INTRAVENOUS at 22:06

## 2024-04-07 RX ADMIN — GABAPENTIN 100 MILLIGRAM(S): 400 CAPSULE ORAL at 22:03

## 2024-04-07 RX ADMIN — Medication 3 MILLIGRAM(S): at 00:09

## 2024-04-07 RX ADMIN — Medication 3 MILLILITER(S): at 23:21

## 2024-04-07 RX ADMIN — Medication 2 DROP(S): at 13:01

## 2024-04-07 RX ADMIN — Medication 5 MILLILITER(S): at 00:10

## 2024-04-07 RX ADMIN — PANTOPRAZOLE SODIUM 40 MILLIGRAM(S): 20 TABLET, DELAYED RELEASE ORAL at 06:13

## 2024-04-07 RX ADMIN — Medication 500 MILLIGRAM(S): at 12:56

## 2024-04-07 RX ADMIN — CHLORHEXIDINE GLUCONATE 1 APPLICATION(S): 213 SOLUTION TOPICAL at 06:12

## 2024-04-07 NOTE — PROGRESS NOTE ADULT - NS ATTEND AMEND GEN_ALL_CORE FT
75yo woman  h/o T2DM (4/4/24: A1C = 6.2%), HTN, HLD, anemia, hypothyroidism, RA, fibromyalgia, remote cerebral aneurysm repair, acute hypercapnic respiratory failure with tracheostomy, PEG tube (initially placed 2022), and bedbound, recurrent C diff presented for PEG tube dislodgment. Admitted 4/2/24  weight = 54.5 kg    Recurrent C diff - first episode Aug 2023 treated with tapering PO vanco, recurrent episode 1/31/24 treated with PO vanco and then fidaxomicin completed in Feb - most recently tested positive 3/20/24 seen by Dr. Newman 3/29 outpatient, started on fidaxomicin that day - tube feeds concurrently held, unclear if improving afterwards per family  Chronic sacral decubitus wound  3/20 Klebisella bacteruria R only to amp and sputum few Pseudomonas R to FQs w/o polys on gram stain - treatment of urine was deferred to avoid exacerbating C diff    Intermittently persistent temps: 4/2-100, 4/4-101.4, 4/6-100.2, 4/7 102.5  4/2 Blood Cultures x 2 NGTD;  Positive MRSA/MSSA PCR  4/3 Urine cultures NEG  4/3 CT a/p: no infectious focus or colitis  4/5 Procal 6.6 increased from 0.08 on 4/3 lactate 4.3  4/7 Tmax 102.5, no leukocytosis-ID recalled  4/7 No cellulitis seen around peg/trach. Sacral Wound looks clean surrounded by area of DTI  4/7 U/a neg-1 wbc  4/8 or 4/9 plans with IR for Peg tube revision    4/3 IR - PEG exchanged bedside to 20 Fr Kangaroo EnFit  - Bedside XR demonstrates appropriately positioned G-tube with contrast filling into the stomach and bowel.    4/4 Wound care assessment appreciated:   " area of persistent nonblanchable dark discoloration that is inconsistent with a patient's surrounding skin tone as well as a white juan j film noted over B/L buttocks/sacral skin, area measures approximately 10cm x 10cm x 0cm- presentation is consistent with a deep tissue injury in evolution with incontinence and fungal involvement present on admission. Within the apex of the gluteal cleft/base of sacrum there is full thickness skin loss with red, beefy tissue noted, area measures approximately 3cm x 1cm x 0.3cm- presentation is consistent with a deep tissue injury in evolution with incontinence involvement present on admission."    Antibiotics  Meropenem 4/2 -->4/3, 4/7--->  IV Vanco 4/2  Fdaxomicin 4/3-->        PLAN   Overall no obvious clinical signs of infection  Check RVP please  Doubt Pna awaiting cxr  Check Duplex study for lower extremities  IF continues to be febrile consider ct chest and possibly abdomen/pelvis  Follow up all cultures  If infectious work up neg hope to stop meropenen b/c this can worsen the c diff    D/w NP     Anna Burns MD  [phone] (pager)  [phone] (office)

## 2024-04-07 NOTE — PROGRESS NOTE ADULT - SUBJECTIVE AND OBJECTIVE BOX
INFECTIOUS DISEASE FOLLOW UP NOTE:    Interval History/ROS: Patient is a 74y old  Female who presents with a chief complaint of Dislodged G Tube (2024 07:28)      Overnight events: Febrile 102.5 with Tacchycardia    REVIEW OF SYSTEMS:  CONSTITUTIONAL: No fever or chills  HEENT: No sore throat  RESPIRATORY: No cough, no shortness of breath  CARDIOVASCULAR: No chest pain or palpitations  GASTROINTESTINAL: No abdominal or epigastric pain  GENITOURINARY: No dysuria  NEUROLOGICAL: No headache/dizziness  MSK: No joint pain, erythema, or swelling; no back pain  SKIN: No itching, rashes   All other ROS negative except noted above    Prior hospital charts reviewed [Yes]  Primary team notes reviewed [Yes]  Other consultant notes reviewed [Yes]    Allergies:  pineapple (Unknown)  Tagamet (Unknown)  heparin (Unknown)  walnut (Unknown)  metronidazole (Rash)  penicillin (Unknown)  Lyrica (Unknown)  Pecan, Filbert, Hazelnut (Unknown)      ANTIMICROBIALS:   fidaxomicin 200 two times a day  meropenem  IVPB 1000 every 8 hours  meropenem  IVPB        OTHER MEDS: MEDICATIONS  (STANDING):  acetaminophen   Oral Liquid .. 1000 every 6 hours PRN  albuterol/ipratropium for Nebulization 3 every 6 hours  amLODIPine   Tablet 10 <User Schedule>  escitalopram 10 <User Schedule>  fentaNYL   Patch  25 MICROgram(s)/Hr 1 every 72 hours  gabapentin Solution 100 <User Schedule>  insulin glargine Injectable (LANTUS) 7 <User Schedule>  insulin lispro (ADMELOG) corrective regimen sliding scale  every 6 hours  levothyroxine Injectable 87.5 <User Schedule>  melatonin Liquid 3 at bedtime  ondansetron Injectable 4 every 8 hours PRN  pantoprazole  Injectable 40 <User Schedule>  predniSONE  Solution 5 <User Schedule>  QUEtiapine 12.5 <User Schedule>  simethicone 80 every 12 hours  traMADol 25 every 8 hours PRN      Vital Signs Last 24 Hrs  T(F): 102.5 (24 @ 06:00), Max: 102.5 (24 @ 06:00)    Vital Signs Last 24 Hrs  HR: 111 (24 @ 06:12) (80 - 116)  BP: 172/84 (24 @ 06:00) (110/56 - 172/84)  RR: 19 (24 @ 06:00)  SpO2: 99% (24 @ 06:12) (98% - 100%)  Wt(kg): --    EXAM:  GENERAL: NAD, lying in bed comfortably  HEAD: No head lesions  EYES: Conjunctiva pink and cornea white  ENT: Normal external ears and nose, no discharges; moist mucous membranes + trach with vent,   NECK: Supple, nontender to palpation  CHEST/LUNG: Clear to auscultation bilaterally  HEART: S1 S2  ABDOMEN: Soft, nontender, nondistended; normoactive bowel sounds, + peg with leakage  EXTREMITIES: No clubbing, cyanosis, or petal edema  NERVOUS SYSTEM: Alert and nods appropriately non verbal,. No new focal deficits   MSK: No joint erythema, swelling or pain  SKIN: No rashes or lesions, no superficial thrombophlebitis + sacral wound    Labs:                        7.8    5.47  )-----------( 105      ( 2024 06:44 )             27.4         138  |  102  |  8   ----------------------------<  206<H>  3.9   |  25  |  <0.30<L>    Ca    8.7      2024 06:44  Phos  2.0       Mg     1.6         TPro  6.8  /  Alb  3.1<L>  /  TBili  0.4  /  DBili  x   /  AST  21  /  ALT  21  /  AlkPhos  47        WBC Trend:  WBC Count: 5.47 (24 @ 06:44)  WBC Count: 4.59 (24 @ 07:36)  WBC Count: 6.58 (24 @ 06:36)  WBC Count: 6.67 (24 @ 07:40)      Creatine Trend:  Creatinine: <0.30 ()  Creatinine: <0.30 ()  Creatinine: <0.30 ()  Creatinine: 0.33 ()      Liver Biochemical Testing Trend:  Alanine Aminotransferase (ALT/SGPT): 21 ()  Alanine Aminotransferase (ALT/SGPT): 21 ()  Alanine Aminotransferase (ALT/SGPT): 19 ()  Alanine Aminotransferase (ALT/SGPT): 20 (-)  Alanine Aminotransferase (ALT/SGPT): 20 ()  Aspartate Aminotransferase (AST/SGOT): 21 (24 @ 06:44)  Aspartate Aminotransferase (AST/SGOT): 17 (24 @ 07:36)  Aspartate Aminotransferase (AST/SGOT): 25 (24 @ 06:36)  Aspartate Aminotransferase (AST/SGOT): 23 (24 @ 07:09)  Aspartate Aminotransferase (AST/SGOT): 17 (24 @ 23:40)  Bilirubin Total: 0.4 ()  Bilirubin Total: 0.4 ()  Bilirubin Total: 0.5 ()  Bilirubin Total: 0.5 ()  Bilirubin Total: 0.6 ()      Trend LDH  23 @ 06:42  215      Urinalysis Basic - ( 2024 07:18 )    Color: Yellow / Appearance: Clear / S.010 / pH: x  Gluc: x / Ketone: Trace mg/dL  / Bili: Negative / Urobili: 0.2 mg/dL   Blood: x / Protein: Negative mg/dL / Nitrite: Negative   Leuk Esterase: Negative / RBC: 20 /HPF / WBC 1 /HPF   Sq Epi: x / Non Sq Epi: 1 /HPF / Bacteria: Occasional /HPF        MICROBIOLOGY:    MRSA PCR Result.: Detected (24 @ 23:40)  MRSA PCR Result.: NotDetec (24 @ 16:20)  MRSA PCR Result.: NotDetec (10-30-23 @ 16:42)      Culture - Sputum (collected 2024 06:43)  Source: .Sputum Sputum    Culture - Blood (collected 2024 21:00)  Source: .Blood Blood-Peripheral  Preliminary Report:    No growth at 48 Hours    Culture - Urine (collected 2024 02:07)  Source: Clean Catch Clean Catch (Midstream)  Final Report:    <10,000 CFU/mL Normal Urogenital Shanda    Culture - Blood (collected 2024 23:39)  Source: .Blood Blood-Peripheral  Preliminary Report:    No growth at 4 days    Culture - Blood (collected 2024 23:39)  Source: .Blood Blood-Peripheral  Preliminary Report:    No growth at 4 days    Culture - Body Fluid with Gram Stain (collected 15 Noe 2024 10:29)  Source: .Body Fluid Synovial Fluid  Final Report:    No growth at 14 days.    Culture - Blood (collected 2024 16:29)  Source: .Blood Blood-Peripheral  Final Report:    No growth at 5 days    Culture - Urine (collected 19 Dec 2023 23:51)  Source: Catheterized Catheterized  Final Report:    <10,000 CFU/mL Normal Urogenital Shanda    Culture - Blood (collected 19 Dec 2023 22:23)  Source: .Blood Blood-Peripheral  Final Report:    No growth at 5 days    Culture - Sputum (collected 19 Dec 2023 22:15)  Source: .Sputum Sputum  Final Report:    Numerous Mixed gram negative rods including    Numerous Pseudomonas aeruginosa    Normal Respiratory Shanda present  Organism: Pseudomonas aeruginosa  Organism: Pseudomonas aeruginosa    Sensitivities:      Method Type: LENIN      -  Aztreonam: S <=4      -  Cefepime: S 4      -  Ceftazidime: S <=1      -  Ciprofloxacin: R >2      -  Imipenem: S <=1      -  Levofloxacin: R >4      -  Meropenem: S <=1      -  Piperacillin/Tazobactam: S <=8      Procalcitonin, Serum: 6.65 (-05)  Procalcitonin, Serum: 0.08 (-02)    Lactate, Blood: 2.3 (04-05 @ 12:02)      RADIOLOGY:  < from: CT Abdomen and Pelvis w/ IV Cont (24 @ 15:22) >    FINDINGS:  LOWER CHEST: Within normal limits.    LIVER: Within normal limits.  BILE DUCTS: Normal caliber.  GALLBLADDER: Within normal limits.  SPLEEN: Within normal limits.  PANCREAS: Within normal limits.  ADRENALS: Within normal limits.  KIDNEYS/URETERS: Within normal limits.    BLADDER: Within normal limits.  REPRODUCTIVE ORGANS: Stable 2.5 cm right adnexal cyst.    BOWEL: No bowel obstruction. Appendix is not visualized. Dislodged PEG   tube with balloon in the anterior abdominal wall with air-filled tract to   stomach.  PERITONEUM: No ascites.  VESSELS: Atherosclerotic changes.  RETROPERITONEUM/LYMPH NODES: No lymphadenopathy.  ABDOMINAL WALL: Stable skin thickening along the gluteal cleft.  BONES: Degenerative changes.    IMPRESSION:  Dislodged PEG tube with balloon in the anterior abdominal wall with   air-filled tract to stomach.    < end of copied text >

## 2024-04-07 NOTE — CHART NOTE - NSCHARTNOTEFT_GEN_A_CORE
MEDICINE PA NOTE:    Event Summary:   Notified by RN patient with fever, Temp- 102F, asymptomatic. Pt. seen and examined at bedside, in NAD. Pt denies chest pain, nausea, vomiting, shortness of breath, abdominal pain; aide at bedside.   Pt is bedbound; tachycardic; bilateral clear sounds, trach intact; PEG intact, soft nontender/nondistended abdomen, pulses 2+ in all extremities, nontender.    >VITAL SIGNS:  T(C): 37.7 (04-06-24 @ 23:45), Max: 37.7 (04-06-24 @ 23:45)  T(F): 99.8 (04-06-24 @ 23:45), Max: 99.8 (04-06-24 @ 23:45)  HR: 111 (04-07-24 @ 06:12) (80 - 111)  BP: 160/77 (04-06-24 @ 23:45) (110/56 - 160/77)  RR: 19 (04-06-24 @ 23:45) (19 - 26)  SpO2: 99% (04-07-24 @ 06:12) (98% - 100%)      >LABORATORY:                        7.9    4.59  )-----------( 109      ( 06 Apr 2024 07:36 )             27.4       04-06    142  |  107  |  7   ----------------------------<  216<H>  2.9<LL>   |  21<L>  |  <0.30<L>    Ca    8.8      06 Apr 2024 07:36  Phos  2.1     04-06  Mg     2.0     04-06    TPro  6.6  /  Alb  2.9<L>  /  TBili  0.4  /  DBili  x   /  AST  17  /  ALT  21  /  AlkPhos  44  04-06      ASSESSMENT/PLAN:   HPI:  75 yo F PMH T2DM on insulin, HTN, HLD, hypothyroidism, RA on Prednisone, Fibromyalgia, cerebral aneurysm s/p repair, acute hypercapnic respiratory failure s/p trach (vent dependent) and PEG (10/22), bedbound presented from home with complaints of possible G tube dislodgement and redness around the site.  Site is red with small amount of pus at insertion site.  Tender to palpation, warm to touch. CT Abdomen/Pelvis done showing dislodgement of G tube with balloon in the anterior abdominal wall with air filled track to stomach.  GI contacted, but unable to replace at bedside given placement of balloon. Surgery consulted--recommend gastrograffin study to eval placement of PEG and possible IR replacement in AM. Given Vancomycin 1 gm IV x 1 due to concern for possible cellulitis.  Of note, patient hemodynamically stable, afebrile, without leukocytosis on presentation.     Per patient's son at bedside, patient currently being treated for C-diff.  Known infection for past 2-3 weeks.  Treated by Dr. Zion Newman, currently receiving Fidaxomicin.  Also report recent UTI--not currently being treated.    Admitted to MICU for ventilator support and treatment of dislodged PEG/abdominal wall cellulitis.  (02 Apr 2024 23:07)      # Fever  - Gave Tylenol   - Ordered BC x2,   - Ordered UA/Urine cx   - Ordered sputum culture  - Ordered CXR  - F/u am labs   - Endorsed by AM team on signout to restart meropenem if febrile. Previously tolerated meropenem, ordered meropenem 1g q8h.   - ID following  - Will continue to monitor vital signs   - Will continue to closely monitor pt's clinical presentation  - Will endorse to AM team to follow    Georges Xie PA-C  Medicine  [id number]

## 2024-04-07 NOTE — PROGRESS NOTE ADULT - ASSESSMENT
73 yo F PMH T2DM on insulin, HTN, HLD, hypothyroidism, RA on Prednisone, Fibromyalgia, cerebral aneurysm s/p repair, chronic hypercapnic respiratory failure s/p trach (vent dependent) and Chronic PEG 2022 , recurrent C.diff infection (follows with Dr. Newman) , bedbound who presented from home with complaints of possible G tube dislodgement and redness around the site. Had CT Abdomen/Pelvis done showing dislodgement of G tube with balloon in the anterior abdominal wall with air filled track to stomach. Admitted to MICU for ventilator management and given vancomycin/ meropenum empirically for treatment of cellulitis. Per family patient has had Known c diff infection for past 2-3 weeks.  Treating with Fidaxomicin.  IR team exchanged PEG on 4/3 however later in the day it remained with leakage.  IR Attending adjusted PEG at bedside on 4/4 and PEG remained with moderate amount of PEG leakage once feeds initiated at reduced rate.     4/6: PEG continued to have moderate leakage with feeds going, unable to tolerate goal rate.  IR informed, planning for PEG revision on 4/8 or 4/9.  WIll continue PEG feeds at reduced rate as tolerated. Daughter, Marysol, updated. Potassium 2.9, to be repleted.    75 yo F PMH T2DM on insulin, HTN, HLD, hypothyroidism, RA on Prednisone, Fibromyalgia, cerebral aneurysm s/p repair, chronic hypercapnic respiratory failure s/p trach (vent dependent) and Chronic PEG 2022 , recurrent C.diff infection (follows with Dr. Newman) , bedbound who presented from home with complaints of possible G tube dislodgement and redness around the site. Had CT Abdomen/Pelvis done showing dislodgement of G tube with balloon in the anterior abdominal wall with air filled track to stomach. Admitted to MICU for ventilator management and given vancomycin/meropenem empirically for treatment of cellulitis. Per family patient has had Known c diff infection for past 2-3 weeks.  Treating with Fidaxomicin.  IR team exchanged PEG on 4/3 however later in the day it remained with leakage.  IR Attending adjusted PEG at bedside on 4/4 and PEG remained with moderate amount of PEG leakage once feeds initiated at reduced rate.     4/7:  Continues to have moderate amount of leakage from PEG - plan for revision with IR tomorrow.   Febrile overnight to 102.5 - pancultured and restarted on meropenem - discussed with ID, BL UE and LE dopplers ordered, RVP ordered, continuing meropenem during infectious workup.  Brief episode of desat this am - ambu lavage with minimal secretions.  Daughter Marysol updated via telephone.

## 2024-04-07 NOTE — PROGRESS NOTE ADULT - ASSESSMENT
75yo woman  h/o T2DM (4/4/24: A1C = 6.2%), HTN, HLD, anemia, hypothyroidism, RA, fibromyalgia, remote cerebral aneurysm repair, acute hypercapnic respiratory failure with tracheostomy, PEG tube (initially placed 2022), and bedbound, recurrent C diff presented for PEG tube dislodgment. Admitted 4/2/24  weight = 54.5 kg    Recurrent C diff - first episode Aug 2023 treated with tapering PO vanco, recurrent episode 1/31/24 treated with PO vanco and then fidaxomicin completed in Feb - most recently tested positive 3/20/24 seen by Dr. Newman 3/29 outpatient, started on fidaxomicin that day - tube feeds concurrently held, unclear if improving afterwards per family  Chronic sacral decubitus wound  3/20 Klebisella bacteruria R only to amp and sputum few Pseudomonas R to FQs w/o polys on gram stain - treatment of urine was deferred to avoid exacerbating C diff    Intermittently persistent temps: 4/2-100, 4/4-101.4, 4/6-100.2, 4/7 102.5  4/2 Blood Cultures x 2 NGTD;  Positive MRSA/MSSA PCR  4/3 Urine cultures NEG  4/3 CT a/p: no infectious focus or colitis  4/5 Procal 6.6 increased from 0.08 on 4/3 lactate 4.3  4/7 Tmax 102.5, no leukocytosis-ID recalled  4/7 No cellulitis seen around peg/trach. Sacral Wound looks clean surrounded by area of DTI  4/7 U/a neg-1 wbc  4/8 or 4/9 plans with IR for Peg tube revision    4/3 IR - PEG exchanged bedside to 20 Fr Kangaroo EnFit  - Bedside XR demonstrates appropriately positioned G-tube with contrast filling into the stomach and bowel.      4/4 Wound care assessment appreciated:   " area of persistent nonblanchable dark discoloration that is inconsistent with a patient's surrounding skin tone as well as a white juan j film noted over B/L buttocks/sacral skin, area measures approximately 10cm x 10cm x 0cm- presentation is consistent with a deep tissue injury in evolution with incontinence and fungal involvement present on admission. Within the apex of the gluteal cleft/base of sacrum there is full thickness skin loss with red, beefy tissue noted, area measures approximately 3cm x 1cm x 0.3cm- presentation is consistent with a deep tissue injury in evolution with incontinence involvement present on admission."    Antibiotics  Meropenem 4/2 -->4/3, 4/7--->  IV Vanco 4/2  Fdaxomicin 4/3-->        PLAN to be d/w Attending             75yo woman  h/o T2DM (4/4/24: A1C = 6.2%), HTN, HLD, anemia, hypothyroidism, RA, fibromyalgia, remote cerebral aneurysm repair, acute hypercapnic respiratory failure with tracheostomy, PEG tube (initially placed 2022), and bedbound, recurrent C diff presented for PEG tube dislodgment. Admitted 4/2/24  weight = 54.5 kg    Recurrent C diff - first episode Aug 2023 treated with tapering PO vanco, recurrent episode 1/31/24 treated with PO vanco and then fidaxomicin completed in Feb - most recently tested positive 3/20/24 seen by Dr. Newman 3/29 outpatient, started on fidaxomicin that day - tube feeds concurrently held, unclear if improving afterwards per family  Chronic sacral decubitus wound  3/20 Klebisella bacteruria R only to amp and sputum few Pseudomonas R to FQs w/o polys on gram stain - treatment of urine was deferred to avoid exacerbating C diff    Intermittently persistent temps: 4/2-100, 4/4-101.4, 4/6-100.2, 4/7 102.5  4/2 Blood Cultures x 2 NGTD;  Positive MRSA/MSSA PCR  4/3 Urine cultures NEG  4/3 CT a/p: no infectious focus or colitis  4/5 Procal 6.6 increased from 0.08 on 4/3 lactate 4.3  4/7 Tmax 102.5, no leukocytosis-ID recalled  4/7 No cellulitis seen around peg/trach. Sacral Wound looks clean surrounded by area of DTI  4/7 U/a neg-1 wbc  4/8 or 4/9 plans with IR for Peg tube revision    4/3 IR - PEG exchanged bedside to 20 Fr Kangaroo EnFit  - Bedside XR demonstrates appropriately positioned G-tube with contrast filling into the stomach and bowel.    4/4 Wound care assessment appreciated:   " area of persistent nonblanchable dark discoloration that is inconsistent with a patient's surrounding skin tone as well as a white juan j film noted over B/L buttocks/sacral skin, area measures approximately 10cm x 10cm x 0cm- presentation is consistent with a deep tissue injury in evolution with incontinence and fungal involvement present on admission. Within the apex of the gluteal cleft/base of sacrum there is full thickness skin loss with red, beefy tissue noted, area measures approximately 3cm x 1cm x 0.3cm- presentation is consistent with a deep tissue injury in evolution with incontinence involvement present on admission."    Antibiotics  Meropenem 4/2 -->4/3, 4/7--->  IV Vanco 4/2  Fdaxomicin 4/3-->        PLAN   Overall no clinical signs of infection  Check RVP  Doubt Pna awaiting cxr  Check Duplex study for lower extremities  IF continues to be febrile consider ct chest and possibly abdomen/pelvis  Follow up all cultures  If infectious work up neg then stop meropenen  Plan d/w Dr. Burns and floor NP

## 2024-04-07 NOTE — PROGRESS NOTE ADULT - TIME BILLING
Review of patient records, including history, laboratory data, imaging. Patient evaluation and assessment. Coordination of care. Time excludes teaching. Review of patient records, including history, laboratory data, imaging. Patient evaluation and assessment. Coordination of care. Time excludes --teaching.

## 2024-04-07 NOTE — SWALLOW BEDSIDE ASSESSMENT ADULT - COMMENTS
Continued history:     -Per patient's son at bedside, patient currently being treated for C-diff.  Known infection for past 2-3 weeks. Also report recent UTI--not currently being treated.  -Admitted to MICU for ventilator support and treatment of dislodged PEG/abdominal wall cellulitis.   -IR Attending adjusted PEG at bedside on 4/4 and PEG remained with moderate amount of PEG leakage once feeds initiated at reduced rate.   -4/6: PEG continued to have moderate leakage with feeds going, unable to tolerate goal rate.  IR informed, planning for PEG revision on 4/8 or 4/9.  WIll continue PEG feeds at reduced rate as tolerated.   - 4/7 Temp- 102F, asymptomatic.     Speech & Swallow : KNOWN TO PRIOR ADMISSION SEE BELOW  >11/30 verbal: Daughter reports pt had a FEEs at [place] and was cleared for a diet while on a vent and had SLP at home with patient having po; mainly small sips of thin liquids and minced solids for comfort. No other information provided verbally.  >Known; Home vent settings: (300 TV/ RR 12/5 Peep/ Fio2 30%).  11/18 RR increased to 14 due to hypercarbia from trach collar trial;  Tolerates intermittent PSV 15/5 during day/ Vent HS  >Seen this admission for AAC and PMV IN LINE, see reports for more information. Continued history:     -Per patient's son at bedside, patient currently being treated for C-diff.  Known infection for past 2-3 weeks. Also report recent UTI--not currently being treated.  -Admitted to MICU for ventilator support and treatment of dislodged PEG/abdominal wall cellulitis.   -IR Attending adjusted PEG at bedside on 4/4 and PEG remained with moderate amount of PEG leakage once feeds initiated at reduced rate.   -4/6: PEG continued to have moderate leakage with feeds going, unable to tolerate goal rate.  IR informed, planning for PEG revision on 4/8 or 4/9.  WIll continue PEG feeds at reduced rate as tolerated.   - 4/7 Temp- 102F, asymptomatic.     Speech & Swallow : KNOWN TO PRIOR ADMISSION SEE BELOW  >11/30 verbal: Daughter reports pt had a FEEs at [place] and was cleared for a diet while on a vent and had SLP at home with patient having po; mainly small sips of thin liquids and minced solids for comfort. No other information provided verbally.  >Known; Home vent settings: (300 TV/ RR 12/5 Peep/ Fio2 30%).  11/18 RR increased to 14 due to hypercarbia from trach collar trial;  Tolerates intermittent PSV 15/5 during day/ Vent HS  >Seen for AAC and PMV IN LINE, see reports for more information.

## 2024-04-07 NOTE — SWALLOW BEDSIDE ASSESSMENT ADULT - SWALLOW EVAL: DIAGNOSIS
Order received/appreciated; D/w RCU team regarding order as pt vent/trach with known history with this service. Daughter requesting evaluation. Currently febrile and with desaturations therefore NP Isaura deferring intervention/assessment this date. Will remain on consult pending appropriateness. Miladis Villanueva MS CCC-SLP Prefer teams   extension [phone]#

## 2024-04-07 NOTE — PROGRESS NOTE ADULT - PROBLEM SELECTOR PLAN 4
Sacral wound present on admission   Wound care team consulted Statement Selected Chronic Hypoxemic/Hypercapnic Respiratory Failure  Chronic Trach/Vent dependant 2/2 Hypercapnic Resp Failure since 10/2022   - S/p Trach # 8 Distal XLT Shiley Cuffed   - Home Vent: volume /12/30%/+5  - Trach Care and suction PRN

## 2024-04-07 NOTE — PROGRESS NOTE ADULT - PROBLEM SELECTOR PLAN 3
Chronic Hypoxemic/Hypercapnic Respiratory Failure  Chronic Trach/ Vent dependant 2/2 Hypercapnic Resp Failure since 10/2022   - S/p Trach # 8 Distal XLT Shiley Cuffed   - Home Vent: volume /12/30%/+5  - Wean as able   - Trach Care and suction PRN intermittently persistent high fevers  - IV Vanco/meropenem x1 on admission for concern for cellulitis  - 4/2 blood cultures negative x2, MRSA/MSSA PCR +  - 4/3 urine culture negative, CT A/O - no infectious focus or colitis  - 4/5 Procal 6.6 increased from 0.08 on 4/3, lactate 4.3  - 4/7 fever, meropenem restarted - no leukocytosis - RVP pending, UA neg, dopplers ordered, continue boone during infectious workup as per ID   - ID recs appreciated

## 2024-04-07 NOTE — PROGRESS NOTE ADULT - PROBLEM SELECTOR PLAN 2
Being treated for C diff as outpatient with follow up with Infectious disease, Dr Newman   - Will resume on Home fidaxomicin for 5 more days (total 10 day course)  4/3 -->4/13  _ ID reccomends Zinvaya 50mg IV  to be given to reduce Cdiff infections  - Infectious diease consult appreciated Being treated for C diff as outpatient with follow up with Infectious disease, Dr Newman   - Will resume on Home fidaxomicin for 5 more days (total 10 day course) 4/3 -->4/13  - ID recommends Zinvaya 50mg IV to be given to reduce Cdiff infections  - Infectious diease consult appreciated

## 2024-04-07 NOTE — PROGRESS NOTE ADULT - PROBLEM SELECTOR PLAN 1
PEG tube dislodgement on admission   - S/p CT abd/pelvis showing dislodgement of G tube with balloon in the anterior abdominal wall with air filled track to stomach.  - initially GI and Surgery consulted   - S/p bedside exchange by IR on 4/3- # 20 Fr Kangaroo EnFit placed   - 4/4:PEG leaking.  Feeds held.  IR adjusted PEG at bedside. G-tube study pending to exclude extravasation. Patient remains NPO except for meds, on IVF, LR at 50ml/hr.  - 4/5: PEG still leaking, adjusted at bedisde by IR Attending.  - 4/6: PEG still leaking, IR informed, planned for PEG revision on 4/8 or 4/9.  PEG feeds ongoing at reduced rate as tolerated. PEG tube dislodgement on admission   - S/p CT abd/pelvis showing dislodgement of G tube with balloon in the anterior abdominal wall with air filled track to stomach.  - initially GI and Surgery consulted   - S/p bedside exchange by IR on 4/3- # 20 Fr Kangaroo EnFit placed   - 4/4: PEG leaking.  Feeds held.  IR adjusted PEG at bedside. G-tube study pending to exclude extravasation. Patient remains NPO except for meds, on IVF, LR at 50ml/hr.  - 4/5: PEG still leaking, adjusted at bedisde by IR Attending.  - 4/6: PEG still leaking, IR informed, planned for PEG revision on 4/8 or 4/9.  PEG feeds ongoing at reduced rate as tolerated.

## 2024-04-07 NOTE — PROGRESS NOTE ADULT - SUBJECTIVE AND OBJECTIVE BOX
Patient is a 74y old  Female who presents with a chief complaint of Dislodged G Tube (06 Apr 2024 10:57)      Interval Events:    REVIEW OF SYSTEMS:  [ ] Positive  [ ] All other systems negative  [ ] Unable to assess ROS because ________    Vital Signs Last 24 Hrs  T(C): 39.2 (04-07-24 @ 06:00), Max: 39.2 (04-07-24 @ 06:00)  T(F): 102.5 (04-07-24 @ 06:00), Max: 102.5 (04-07-24 @ 06:00)  HR: 111 (04-07-24 @ 06:12) (80 - 116)  BP: 172/84 (04-07-24 @ 06:00) (110/56 - 172/84)  RR: 19 (04-07-24 @ 06:00) (19 - 26)  SpO2: 99% (04-07-24 @ 06:12) (98% - 100%)    PHYSICAL EXAM:  HEENT:   [ ]Tracheostomy:  [ ]Pupils equal  [ ]No oral lesions  [ ]Abnormal    SKIN  [ ]No Rash  [ ] Abnormal  [ ] pressure    CARDIAC  [ ]Regular  [ ]Abnormal    PULMONARY  [ ]Bilateral Clear Breath Sounds  [ ]Normal Excursion  [ ]Abnormal    GI  [ ]PEG      [ ] +BS		              [ ]Soft, nondistended, nontender	  [ ]Abnormal    MUSCULOSKELETAL                                   [ ]Bedbound                 [ ]Abnormal    [ ]Ambulatory/OOB to chair                           EXTREMITIES                                         [ ]Normal  [ ]Edema                           NEUROLOGIC  [ ] Normal, non focal  [ ] Focal findings:    PSYCHIATRIC  [ ]Alert and appropriate  [ ] Sedated	 [ ]Agitated    :  Mcbride: [ ] Yes, if yes: Date of Placement:                   [  ] No    LINES: Central Lines [ ] Yes, if yes: Date of Placement                                     [  ] No    HOSPITAL MEDICATIONS:  MEDICATIONS  (STANDING):  albuterol/ipratropium for Nebulization 3 milliLiter(s) Nebulizer every 6 hours  amLODIPine   Tablet 10 milliGRAM(s) Oral <User Schedule>  artificial tears (preservative free) Ophthalmic Solution 2 Drop(s) Both EYES every 6 hours  ascorbic acid 500 milliGRAM(s) Oral daily  Biotene Dry Mouth Oral Rinse 5 milliLiter(s) Swish and Spit every 6 hours  chlorhexidine 0.12% Liquid 15 milliLiter(s) Oral Mucosa every 12 hours  chlorhexidine 4% Liquid 1 Application(s) Topical <User Schedule>  escitalopram 10 milliGRAM(s) Oral <User Schedule>  fentaNYL   Patch  25 MICROgram(s)/Hr 1 Patch Transdermal every 72 hours  fidaxomicin 200 milliGRAM(s) Oral two times a day  gabapentin Solution 100 milliGRAM(s) Oral <User Schedule>  insulin glargine Injectable (LANTUS) 7 Unit(s) SubCutaneous <User Schedule>  insulin lispro (ADMELOG) corrective regimen sliding scale   SubCutaneous every 6 hours  lactated ringers. 1000 milliLiter(s) (50 mL/Hr) IV Continuous <Continuous>  levothyroxine Injectable 87.5 MICROGram(s) IV Push <User Schedule>  lidocaine   4% Patch 1 Patch Transdermal at bedtime  lidocaine   4% Patch 1 Patch Transdermal at bedtime  melatonin Liquid 3 milliGRAM(s) Oral at bedtime  meropenem  IVPB      meropenem  IVPB 1000 milliGRAM(s) IV Intermittent every 8 hours  multivitamin 1 Tablet(s) Oral daily  pantoprazole  Injectable 40 milliGRAM(s) IV Push <User Schedule>  predniSONE  Solution 5 milliGRAM(s) Oral <User Schedule>  QUEtiapine 12.5 milliGRAM(s) Oral <User Schedule>  simethicone 80 milliGRAM(s) Chew every 12 hours  tobramycin 0.3% Ophthalmic Solution 2 Drop(s) Both EYES every 6 hours    MEDICATIONS  (PRN):  acetaminophen   Oral Liquid .. 1000 milliGRAM(s) Oral every 6 hours PRN Temp greater or equal to 38C (100.4F), Mild Pain (1 - 3)  ondansetron Injectable 4 milliGRAM(s) IV Push every 8 hours PRN Nausea and/or Vomiting  petrolatum Ophthalmic Ointment 1 Application(s) Both EYES every 6 hours PRN eye dryness  sodium chloride 0.65% Nasal 1 Spray(s) Both Nostrils every 12 hours PRN Congestion  traMADol 25 milliGRAM(s) Oral every 8 hours PRN Mild Pain (1 - 3)      LABS:                        7.8    5.47  )-----------( 105      ( 07 Apr 2024 06:44 )             27.4     04-07    138  |  102  |  8   ----------------------------<  206<H>  3.9   |  25  |  <0.30<L>    Ca    8.7      07 Apr 2024 06:44  Phos  2.0     04-07  Mg     1.6     04-07    TPro  6.8  /  Alb  3.1<L>  /  TBili  0.4  /  DBili  x   /  AST  21  /  ALT  21  /  AlkPhos  47  04-07      Urinalysis Basic - ( 07 Apr 2024 06:44 )    Color: x / Appearance: x / SG: x / pH: x  Gluc: 206 mg/dL / Ketone: x  / Bili: x / Urobili: x   Blood: x / Protein: x / Nitrite: x   Leuk Esterase: x / RBC: x / WBC x   Sq Epi: x / Non Sq Epi: x / Bacteria: x          CAPILLARY BLOOD GLUCOSE    MICROBIOLOGY:     RADIOLOGY:  [ ] Reviewed and interpreted by me    Mode: AC/ CMV (Assist Control/ Continuous Mandatory Ventilation)  RR (machine): 12  TV (machine): 300  FiO2: 30  PEEP: 5  ITime: 1  MAP: 8  PIP: 24   Patient is a 74y old  Female who presents with a chief complaint of Dislodged G Tube (06 Apr 2024 10:57)      Interval Events:  Febrile to 102.5 overnight.                     Pancultured, meropenem started.     REVIEW OF SYSTEMS:  [ ] Positive  [ ] All other systems negative  [X] Unable to assess ROS because ___minimally communicative 2/2 lethargy____    Vital Signs Last 24 Hrs  T(C): 39.2 (04-07-24 @ 06:00), Max: 39.2 (04-07-24 @ 06:00)  T(F): 102.5 (04-07-24 @ 06:00), Max: 102.5 (04-07-24 @ 06:00)  HR: 111 (04-07-24 @ 06:12) (80 - 116)  BP: 172/84 (04-07-24 @ 06:00) (110/56 - 172/84)  RR: 19 (04-07-24 @ 06:00) (19 - 26)  SpO2: 99% (04-07-24 @ 06:12) (98% - 100%)    PHYSICAL EXAM:  HEENT:   [X]Tracheostomy: #8 distal XLT cuffed shiley   [X]Pupils equal  [ ]No oral lesions  [ ]Abnormal    SKIN  [ ]No Rash  [ ] Abnormal  [X] pressure - sacral pressure injury    CARDIAC  [X]Regular  [X]Abnormal - intermittent tachycardia    PULMONARY  [ ]Bilateral Clear Breath Sounds  [ ]Normal Excursion  [X]Abnormal - BL Coarse BS    GI  [X]PEG      [X] +BS		              [X]Soft, slightly distended, nontender	  [X]Abnormal - + PEG w/ leakage, no redness at site; + rectal tube     MUSCULOSKELETAL                                   [X]Bedbound                 [ ]Abnormal    [ ]Ambulatory/OOB to chair                           EXTREMITIES                                         [X]Normal  [ ]Edema                           NEUROLOGIC  [ ] Normal, non focal  [X] Focal findings: lethargic this am, + following commands on all 4 extremited    PSYCHIATRIC  [ ]Alert and appropriate  [ ] Sedated	 [ ]Agitated    :  Mcbride: [ ] Yes, if yes: Date of Placement:                   [X] No    LINES: Central Lines [ ] Yes, if yes: Date of Placement                                     [X] No    HOSPITAL MEDICATIONS:  MEDICATIONS  (STANDING):  albuterol/ipratropium for Nebulization 3 milliLiter(s) Nebulizer every 6 hours  amLODIPine   Tablet 10 milliGRAM(s) Oral <User Schedule>  artificial tears (preservative free) Ophthalmic Solution 2 Drop(s) Both EYES every 6 hours  ascorbic acid 500 milliGRAM(s) Oral daily  Biotene Dry Mouth Oral Rinse 5 milliLiter(s) Swish and Spit every 6 hours  chlorhexidine 0.12% Liquid 15 milliLiter(s) Oral Mucosa every 12 hours  chlorhexidine 4% Liquid 1 Application(s) Topical <User Schedule>  escitalopram 10 milliGRAM(s) Oral <User Schedule>  fentaNYL   Patch  25 MICROgram(s)/Hr 1 Patch Transdermal every 72 hours  fidaxomicin 200 milliGRAM(s) Oral two times a day  gabapentin Solution 100 milliGRAM(s) Oral <User Schedule>  insulin glargine Injectable (LANTUS) 7 Unit(s) SubCutaneous <User Schedule>  insulin lispro (ADMELOG) corrective regimen sliding scale   SubCutaneous every 6 hours  lactated ringers. 1000 milliLiter(s) (50 mL/Hr) IV Continuous <Continuous>  levothyroxine Injectable 87.5 MICROGram(s) IV Push <User Schedule>  lidocaine   4% Patch 1 Patch Transdermal at bedtime  lidocaine   4% Patch 1 Patch Transdermal at bedtime  melatonin Liquid 3 milliGRAM(s) Oral at bedtime  meropenem  IVPB      meropenem  IVPB 1000 milliGRAM(s) IV Intermittent every 8 hours  multivitamin 1 Tablet(s) Oral daily  pantoprazole  Injectable 40 milliGRAM(s) IV Push <User Schedule>  predniSONE  Solution 5 milliGRAM(s) Oral <User Schedule>  QUEtiapine 12.5 milliGRAM(s) Oral <User Schedule>  simethicone 80 milliGRAM(s) Chew every 12 hours  tobramycin 0.3% Ophthalmic Solution 2 Drop(s) Both EYES every 6 hours    MEDICATIONS  (PRN):  acetaminophen   Oral Liquid .. 1000 milliGRAM(s) Oral every 6 hours PRN Temp greater or equal to 38C (100.4F), Mild Pain (1 - 3)  ondansetron Injectable 4 milliGRAM(s) IV Push every 8 hours PRN Nausea and/or Vomiting  petrolatum Ophthalmic Ointment 1 Application(s) Both EYES every 6 hours PRN eye dryness  sodium chloride 0.65% Nasal 1 Spray(s) Both Nostrils every 12 hours PRN Congestion  traMADol 25 milliGRAM(s) Oral every 8 hours PRN Mild Pain (1 - 3)      LABS:                        7.8    5.47  )-----------( 105      ( 07 Apr 2024 06:44 )             27.4     04-07    138  |  102  |  8   ----------------------------<  206<H>  3.9   |  25  |  <0.30<L>    Ca    8.7      07 Apr 2024 06:44  Phos  2.0     04-07  Mg     1.6     04-07    TPro  6.8  /  Alb  3.1<L>  /  TBili  0.4  /  DBili  x   /  AST  21  /  ALT  21  /  AlkPhos  47  04-07      Urinalysis Basic - ( 07 Apr 2024 06:44 )    Color: x / Appearance: x / SG: x / pH: x  Gluc: 206 mg/dL / Ketone: x  / Bili: x / Urobili: x   Blood: x / Protein: x / Nitrite: x   Leuk Esterase: x / RBC: x / WBC x   Sq Epi: x / Non Sq Epi: x / Bacteria: x    CAPILLARY BLOOD GLUCOSE    MICROBIOLOGY:     RADIOLOGY:  [ ] Reviewed and interpreted by me    Mode: AC/ CMV (Assist Control/ Continuous Mandatory Ventilation)  RR (machine): 12  TV (machine): 300  FiO2: 30  PEEP: 5  ITime: 1  MAP: 8  PIP: 24

## 2024-04-08 LAB
-  AZTREONAM: SIGNIFICANT CHANGE UP
-  CEFEPIME: SIGNIFICANT CHANGE UP
-  CEFTAZIDIME: SIGNIFICANT CHANGE UP
-  CIPROFLOXACIN: SIGNIFICANT CHANGE UP
-  IMIPENEM: SIGNIFICANT CHANGE UP
-  LEVOFLOXACIN: SIGNIFICANT CHANGE UP
-  MEROPENEM: SIGNIFICANT CHANGE UP
-  PIPERACILLIN/TAZOBACTAM: SIGNIFICANT CHANGE UP
ALBUMIN SERPL ELPH-MCNC: 2.8 G/DL — LOW (ref 3.3–5)
ALP SERPL-CCNC: 45 U/L — SIGNIFICANT CHANGE UP (ref 40–120)
ALT FLD-CCNC: 19 U/L — SIGNIFICANT CHANGE UP (ref 10–45)
ANION GAP SERPL CALC-SCNC: 11 MMOL/L — SIGNIFICANT CHANGE UP (ref 5–17)
AST SERPL-CCNC: 16 U/L — SIGNIFICANT CHANGE UP (ref 10–40)
BASOPHILS # BLD AUTO: 0.01 K/UL — SIGNIFICANT CHANGE UP (ref 0–0.2)
BASOPHILS NFR BLD AUTO: 0.2 % — SIGNIFICANT CHANGE UP (ref 0–2)
BILIRUB SERPL-MCNC: 0.3 MG/DL — SIGNIFICANT CHANGE UP (ref 0.2–1.2)
BUN SERPL-MCNC: 12 MG/DL — SIGNIFICANT CHANGE UP (ref 7–23)
CALCIUM SERPL-MCNC: 8.3 MG/DL — LOW (ref 8.4–10.5)
CHLORIDE SERPL-SCNC: 103 MMOL/L — SIGNIFICANT CHANGE UP (ref 96–108)
CO2 SERPL-SCNC: 26 MMOL/L — SIGNIFICANT CHANGE UP (ref 22–31)
CREAT SERPL-MCNC: <0.3 MG/DL — LOW (ref 0.5–1.3)
CULTURE RESULTS: ABNORMAL
CULTURE RESULTS: SIGNIFICANT CHANGE UP
CULTURE RESULTS: SIGNIFICANT CHANGE UP
EGFR: 113 ML/MIN/1.73M2 — SIGNIFICANT CHANGE UP
EOSINOPHIL # BLD AUTO: 0.01 K/UL — SIGNIFICANT CHANGE UP (ref 0–0.5)
EOSINOPHIL NFR BLD AUTO: 0.2 % — SIGNIFICANT CHANGE UP (ref 0–6)
GLUCOSE BLDC GLUCOMTR-MCNC: 130 MG/DL — HIGH (ref 70–99)
GLUCOSE BLDC GLUCOMTR-MCNC: 171 MG/DL — HIGH (ref 70–99)
GLUCOSE BLDC GLUCOMTR-MCNC: 257 MG/DL — HIGH (ref 70–99)
GLUCOSE BLDC GLUCOMTR-MCNC: 342 MG/DL — HIGH (ref 70–99)
GLUCOSE SERPL-MCNC: 122 MG/DL — HIGH (ref 70–99)
HCT VFR BLD CALC: 25 % — LOW (ref 34.5–45)
HGB BLD-MCNC: 7.3 G/DL — LOW (ref 11.5–15.5)
IMM GRANULOCYTES NFR BLD AUTO: 2.3 % — HIGH (ref 0–0.9)
LYMPHOCYTES # BLD AUTO: 1.57 K/UL — SIGNIFICANT CHANGE UP (ref 1–3.3)
LYMPHOCYTES # BLD AUTO: 29.8 % — SIGNIFICANT CHANGE UP (ref 13–44)
MAGNESIUM SERPL-MCNC: 1.9 MG/DL — SIGNIFICANT CHANGE UP (ref 1.6–2.6)
MCHC RBC-ENTMCNC: 24.9 PG — LOW (ref 27–34)
MCHC RBC-ENTMCNC: 29.2 GM/DL — LOW (ref 32–36)
MCV RBC AUTO: 85.3 FL — SIGNIFICANT CHANGE UP (ref 80–100)
METHOD TYPE: SIGNIFICANT CHANGE UP
MONOCYTES # BLD AUTO: 0.38 K/UL — SIGNIFICANT CHANGE UP (ref 0–0.9)
MONOCYTES NFR BLD AUTO: 7.2 % — SIGNIFICANT CHANGE UP (ref 2–14)
NEUTROPHILS # BLD AUTO: 3.18 K/UL — SIGNIFICANT CHANGE UP (ref 1.8–7.4)
NEUTROPHILS NFR BLD AUTO: 60.3 % — SIGNIFICANT CHANGE UP (ref 43–77)
NRBC # BLD: 0 /100 WBCS — SIGNIFICANT CHANGE UP (ref 0–0)
ORGANISM # SPEC MICROSCOPIC CNT: ABNORMAL
ORGANISM # SPEC MICROSCOPIC CNT: ABNORMAL
PHOSPHATE SERPL-MCNC: 2.8 MG/DL — SIGNIFICANT CHANGE UP (ref 2.5–4.5)
PLATELET # BLD AUTO: 100 K/UL — LOW (ref 150–400)
POTASSIUM SERPL-MCNC: 3.6 MMOL/L — SIGNIFICANT CHANGE UP (ref 3.5–5.3)
POTASSIUM SERPL-SCNC: 3.6 MMOL/L — SIGNIFICANT CHANGE UP (ref 3.5–5.3)
PROCALCITONIN SERPL-MCNC: 10.07 NG/ML — HIGH (ref 0.02–0.1)
PROT SERPL-MCNC: 6.3 G/DL — SIGNIFICANT CHANGE UP (ref 6–8.3)
RBC # BLD: 2.93 M/UL — LOW (ref 3.8–5.2)
RBC # FLD: 18.9 % — HIGH (ref 10.3–14.5)
SODIUM SERPL-SCNC: 140 MMOL/L — SIGNIFICANT CHANGE UP (ref 135–145)
SPECIMEN SOURCE: SIGNIFICANT CHANGE UP
WBC # BLD: 5.27 K/UL — SIGNIFICANT CHANGE UP (ref 3.8–10.5)
WBC # FLD AUTO: 5.27 K/UL — SIGNIFICANT CHANGE UP (ref 3.8–10.5)

## 2024-04-08 PROCEDURE — 99232 SBSQ HOSP IP/OBS MODERATE 35: CPT

## 2024-04-08 PROCEDURE — 74018 RADEX ABDOMEN 1 VIEW: CPT | Mod: 26

## 2024-04-08 PROCEDURE — 99233 SBSQ HOSP IP/OBS HIGH 50: CPT | Mod: FS

## 2024-04-08 RX ORDER — MAGNESIUM SULFATE 500 MG/ML
2 VIAL (ML) INJECTION ONCE
Refills: 0 | Status: COMPLETED | OUTPATIENT
Start: 2024-04-08 | End: 2024-04-08

## 2024-04-08 RX ORDER — NYSTATIN CREAM 100000 [USP'U]/G
1 CREAM TOPICAL ONCE
Refills: 0 | Status: COMPLETED | OUTPATIENT
Start: 2024-04-08 | End: 2024-04-09

## 2024-04-08 RX ORDER — SODIUM CHLORIDE 0.65 %
1 AEROSOL, SPRAY (ML) NASAL
Refills: 0 | Status: DISCONTINUED | OUTPATIENT
Start: 2024-04-08 | End: 2024-04-11

## 2024-04-08 RX ORDER — VANCOMYCIN HCL 1 G
125 VIAL (EA) INTRAVENOUS EVERY 6 HOURS
Refills: 0 | Status: DISCONTINUED | OUTPATIENT
Start: 2024-04-08 | End: 2024-04-14

## 2024-04-08 RX ORDER — MULTIVIT-MIN/FERROUS GLUCONATE 9 MG/15 ML
15 LIQUID (ML) ORAL DAILY
Refills: 0 | Status: DISCONTINUED | OUTPATIENT
Start: 2024-04-08 | End: 2024-05-01

## 2024-04-08 RX ORDER — GABAPENTIN 400 MG/1
100 CAPSULE ORAL
Refills: 0 | Status: DISCONTINUED | OUTPATIENT
Start: 2024-04-08 | End: 2024-05-01

## 2024-04-08 RX ADMIN — Medication 3 MILLILITER(S): at 23:19

## 2024-04-08 RX ADMIN — Medication 5 MILLILITER(S): at 12:13

## 2024-04-08 RX ADMIN — CHLORHEXIDINE GLUCONATE 15 MILLILITER(S): 213 SOLUTION TOPICAL at 17:38

## 2024-04-08 RX ADMIN — SIMETHICONE 80 MILLIGRAM(S): 80 TABLET, CHEWABLE ORAL at 05:38

## 2024-04-08 RX ADMIN — QUETIAPINE FUMARATE 12.5 MILLIGRAM(S): 200 TABLET, FILM COATED ORAL at 14:28

## 2024-04-08 RX ADMIN — AMLODIPINE BESYLATE 10 MILLIGRAM(S): 2.5 TABLET ORAL at 10:05

## 2024-04-08 RX ADMIN — LIDOCAINE 1 PATCH: 4 CREAM TOPICAL at 10:00

## 2024-04-08 RX ADMIN — Medication 125 MILLIGRAM(S): at 23:09

## 2024-04-08 RX ADMIN — Medication 125 MILLIGRAM(S): at 12:13

## 2024-04-08 RX ADMIN — SIMETHICONE 80 MILLIGRAM(S): 80 TABLET, CHEWABLE ORAL at 17:37

## 2024-04-08 RX ADMIN — Medication 2 DROP(S): at 05:40

## 2024-04-08 RX ADMIN — MEROPENEM 100 MILLIGRAM(S): 1 INJECTION INTRAVENOUS at 05:40

## 2024-04-08 RX ADMIN — Medication 2 DROP(S): at 02:13

## 2024-04-08 RX ADMIN — Medication 87.5 MICROGRAM(S): at 05:37

## 2024-04-08 RX ADMIN — Medication 125 MILLIGRAM(S): at 17:35

## 2024-04-08 RX ADMIN — Medication 5 MILLILITER(S): at 00:26

## 2024-04-08 RX ADMIN — Medication 3 MILLILITER(S): at 05:37

## 2024-04-08 RX ADMIN — Medication 2 DROP(S): at 23:10

## 2024-04-08 RX ADMIN — Medication 5 MILLILITER(S): at 05:40

## 2024-04-08 RX ADMIN — Medication 2 DROP(S): at 23:11

## 2024-04-08 RX ADMIN — Medication 2 DROP(S): at 12:14

## 2024-04-08 RX ADMIN — Medication 3 MILLILITER(S): at 11:15

## 2024-04-08 RX ADMIN — Medication 2 DROP(S): at 00:26

## 2024-04-08 RX ADMIN — SODIUM CHLORIDE 50 MILLILITER(S): 9 INJECTION, SOLUTION INTRAVENOUS at 10:06

## 2024-04-08 RX ADMIN — Medication 25 GRAM(S): at 10:04

## 2024-04-08 RX ADMIN — Medication 1 TABLET(S): at 12:13

## 2024-04-08 RX ADMIN — Medication 3: at 12:14

## 2024-04-08 RX ADMIN — Medication 5 MILLILITER(S): at 23:10

## 2024-04-08 RX ADMIN — Medication 3 MILLILITER(S): at 18:18

## 2024-04-08 RX ADMIN — MEROPENEM 100 MILLIGRAM(S): 1 INJECTION INTRAVENOUS at 23:07

## 2024-04-08 RX ADMIN — LIDOCAINE 1 PATCH: 4 CREAM TOPICAL at 06:56

## 2024-04-08 RX ADMIN — ESCITALOPRAM OXALATE 10 MILLIGRAM(S): 10 TABLET, FILM COATED ORAL at 17:35

## 2024-04-08 RX ADMIN — LIDOCAINE 1 PATCH: 4 CREAM TOPICAL at 23:08

## 2024-04-08 RX ADMIN — Medication 5 MILLILITER(S): at 17:35

## 2024-04-08 RX ADMIN — Medication 3 MILLIGRAM(S): at 23:08

## 2024-04-08 RX ADMIN — GABAPENTIN 100 MILLIGRAM(S): 400 CAPSULE ORAL at 21:06

## 2024-04-08 RX ADMIN — MEROPENEM 100 MILLIGRAM(S): 1 INJECTION INTRAVENOUS at 14:28

## 2024-04-08 RX ADMIN — LIDOCAINE 1 PATCH: 4 CREAM TOPICAL at 06:57

## 2024-04-08 RX ADMIN — CHLORHEXIDINE GLUCONATE 1 APPLICATION(S): 213 SOLUTION TOPICAL at 05:38

## 2024-04-08 RX ADMIN — SODIUM CHLORIDE 50 MILLILITER(S): 9 INJECTION, SOLUTION INTRAVENOUS at 05:41

## 2024-04-08 RX ADMIN — Medication 2 DROP(S): at 17:36

## 2024-04-08 RX ADMIN — Medication 1: at 00:25

## 2024-04-08 RX ADMIN — FIDAXOMICIN 200 MILLIGRAM(S): 200 GRANULE, FOR SUSPENSION ORAL at 05:38

## 2024-04-08 RX ADMIN — INSULIN GLARGINE 7 UNIT(S): 100 INJECTION, SOLUTION SUBCUTANEOUS at 18:44

## 2024-04-08 RX ADMIN — Medication 1000 MILLIGRAM(S): at 11:11

## 2024-04-08 RX ADMIN — Medication 5 MILLIGRAM(S): at 10:06

## 2024-04-08 RX ADMIN — Medication 2 DROP(S): at 14:28

## 2024-04-08 RX ADMIN — GABAPENTIN 100 MILLIGRAM(S): 400 CAPSULE ORAL at 10:05

## 2024-04-08 RX ADMIN — Medication 1000 MILLIGRAM(S): at 10:41

## 2024-04-08 RX ADMIN — Medication 2 DROP(S): at 10:05

## 2024-04-08 RX ADMIN — PANTOPRAZOLE SODIUM 40 MILLIGRAM(S): 20 TABLET, DELAYED RELEASE ORAL at 05:37

## 2024-04-08 RX ADMIN — Medication 500 MILLIGRAM(S): at 12:13

## 2024-04-08 RX ADMIN — CHLORHEXIDINE GLUCONATE 15 MILLILITER(S): 213 SOLUTION TOPICAL at 05:38

## 2024-04-08 NOTE — PROGRESS NOTE ADULT - PROBLEM SELECTOR PLAN 1
PEG tube dislodgement on admission   - S/p CT abd/pelvis showing dislodgement of G tube with balloon in the anterior abdominal wall with air filled track to stomach.  - initially GI and Surgery consulted   - S/p bedside exchange by IR on 4/3- # 20 Fr Kangaroo EnFit placed   - 4/4: PEG leaking.  Feeds held.  IR adjusted PEG at bedside. G-tube study pending to exclude extravasation. Patient remains NPO except for meds, on IVF, LR at 50ml/hr.  - 4/5: PEG still leaking, adjusted at bedisde by IR Attending.  - 4/6: PEG still leaking, IR informed, planned for PEG revision on 4/8 or 4/9.  PEG feeds ongoing at reduced rate as tolerated.

## 2024-04-08 NOTE — PROGRESS NOTE ADULT - NS ATTEND AMEND GEN_ALL_CORE FT
74 year old female with diabetes, hypertension, hyperlipidemia, hypothyroidism, RA on Prednisone, fibromyalgia, cerebral aneurysm s/p repair, chronic hypercapnic respiratory failure s/p trach (vent dependent) and PEG, recurrent C.diff infection (follows with Dr. Newman) who presented with dislodged PEG.     #neuro  awake, interactive     #resp  full vent support     #Dislodged PEG  She is s/p bedside replacement by IR and appears to be in appropriate position. Will monitor for leakage. 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 If this is the case, will need to d/w Surgery regarding replacing.    #ID  completed  fidaxomicin for total 10 day course, restart vanco 125 mg qid as suppression as she is back on antibiotics   on meropenem pending revision of PEG      #Sacral wound: Pending wound care evaluation    #Fever: Isolated fever 4/4. No other localizing symptoms, no leukocytosis. Discussed with ID team, who recommended cultures and holding antibiotics.   Rest as above. 74 year old female with diabetes, hypertension, hyperlipidemia, hypothyroidism, RA on Prednisone, fibromyalgia, cerebral aneurysm s/p repair, chronic hypercapnic respiratory failure s/p trach (vent dependent) and PEG, recurrent C.diff infection (follows with Dr. Newman) who presented with dislodged PEG.     #neuro  awake, interactive    #resp  full vent support  monitor for hemoptysis - no katie hemoptysis at this time, continue duonebs     #Dislodged PEG  She is s/p bedside replacement by IR, however continues to have significant leakage, awaiting further input form IR, planned for revision     #ID  Pt febrile to 102 on 4/7. Repeat blood cultures drawn. Meropenem resumed. Monitor for further fevers, unclear source at this time   completed  fidaxomicin for total 10 day course, restart vanco po 125 mg qid as suppression as she is back on antibiotics.     #Sacral wound: Pending wound care evaluation

## 2024-04-08 NOTE — PROGRESS NOTE ADULT - PROBLEM SELECTOR PLAN 2
Being treated for C diff as outpatient with follow up with Infectious disease, Dr Newman   - Will resume on Home fidaxomicin for 5 more days (total 10 day course) 4/3 -->4/13  - ID recommends Zinvaya 50mg IV to be given to reduce Cdiff infections  - Infectious diease consult appreciated

## 2024-04-08 NOTE — PROGRESS NOTE ADULT - ASSESSMENT
73yo woman  h/o T2DM (4/4/24: A1C = 6.2%), HTN, HLD, anemia, hypothyroidism, RA, fibromyalgia, remote cerebral aneurysm repair, acute hypercapnic respiratory failure with tracheostomy, PEG tube (initially placed 2022), and bedbound, recurrent C diff presented for PEG tube dislodgment. Admitted 4/2/24  weight = 54.5 kg    Recurrent C diff - first episode Aug 2023 treated with tapering PO vanco, recurrent episode 1/31/24 treated with PO vanco and then fidaxomicin completed in Feb - most recently tested positive 3/20/24 seen by Dr. Newman 3/29 outpatient, started on fidaxomicin that day - tube feeds concurrently held, unclear if improving afterwards per family  Chronic sacral decubitus wound  3/20 Klebisella bacteruria R only to amp and sputum few Pseudomonas R to FQs w/o polys on gram stian - treatment of urine was deferred to avoid exacerbating C diff    Tmax 100, no leukocytosis  Concern for cellulitis around PEG tube site - area with very minimal erythema, remarkable receded from skin paula placed on admission  UA 11 WBC  CT a/p: no infectious focus or colitis  MRSA PCR+    4/3 IR - PEG exchanged bedside to 20 Fr Kangaroo EnFit  - Bedside XR demonstrates appropriately positioned G-tube with contrast filling into the stomach and bowel.    4/2 Blood Cultures x 2 NGTD;  Positive MRSA/MSSA PCR  4/3 Urine cultures NEG  4/4 fever  4/4 Wound care assessment appreciated:   " area of persistent nonblanchable dark discoloration that is inconsistent with a patient's surrounding skin tone as well as a white juan j film noted over B/L buttocks/sacral skin, area measures approximately 10cm x 10cm x 0cm- presentation is consistent with a deep tissue injury in evolution with incontinence and fungal involvement present on admission. Within the apex of the gluteal cleft/base of sacrum there is full thickness skin loss with red, beefy tissue noted, area measures approximately 3cm x 1cm x 0.3cm- presentation is consistent with a deep tissue injury in evolution with incontinence involvement present on admission."    Antibiotics  Meropenem 4/2 -->4/3  IV Vanco 4/2  Fdaxomicin 4/3-->    PEG site no obvious cellulitis - probably more irritation from displacement/leaked contents, no indication for antimicrobials.         Suggest:  completed  fidaxomicin for total 10 day course  on meropenem pending revision of PEG  would restart vanco 125 mg qid as suppression as she is back on antibiotics   - monitor bowel movements/output .

## 2024-04-08 NOTE — PROGRESS NOTE ADULT - ASSESSMENT
73 yo F PMH T2DM on insulin, HTN, HLD, hypothyroidism, RA on Prednisone, Fibromyalgia, cerebral aneurysm s/p repair, chronic hypercapnic respiratory failure s/p trach (vent dependent) and Chronic PEG 2022 , recurrent C.diff infection (follows with Dr. Newman) , bedbound who presented from home with complaints of possible G tube dislodgement and redness around the site. Had CT Abdomen/Pelvis done showing dislodgement of G tube with balloon in the anterior abdominal wall with air filled track to stomach. Admitted to MICU for ventilator management and given vancomycin/meropenem empirically for treatment of cellulitis. Per family patient has had Known c diff infection for past 2-3 weeks.  Treating with Fidaxomicin.  IR team exchanged PEG on 4/3 however later in the day it remained with leakage.  IR Attending adjusted PEG at bedside on 4/4 and PEG remained with moderate amount of PEG leakage once feeds initiated at reduced rate.     4/7:  Continues to have moderate amount of leakage from PEG - plan for revision with IR tomorrow.   Febrile overnight to 102.5 - pancultured and restarted on meropenem - discussed with ID, BL UE and LE dopplers ordered, RVP ordered, continuing meropenem during infectious workup.  Brief episode of desat this am - ambu lavage with minimal secretions.  Daughter Marysol updated via telephone.  75 yo F PMH T2DM on insulin, HTN, HLD, hypothyroidism, RA on Prednisone, Fibromyalgia, cerebral aneurysm s/p repair, chronic hypercapnic respiratory failure s/p trach (vent dependent) and Chronic PEG 2022 , recurrent C.diff infection (follows with Dr. Newman) , bedbound who presented from home with complaints of possible G tube dislodgement and redness around the site. Had CT Abdomen/Pelvis done showing dislodgement of G tube with balloon in the anterior abdominal wall with air filled track to stomach. Admitted to MICU for ventilator management and given vancomycin/meropenem empirically for treatment of cellulitis. Per family patient has had Known c diff infection for past 2-3 weeks.  Treating with Fidaxomicin.  IR team exchanged PEG on 4/3 however later in the day it remained with leakage.  IR Attending adjusted PEG at bedside on 4/4 and PEG remained with moderate amount of PEG leakage once feeds initiated at reduced rate.       4/8:  PEG still with drainage, on IR schedule for tomorrow - will make NPO after midnight.  Afebrile - continuing meropenem - fidaxomicin completed x10 days, restarted vanco PO as per ID reccs.  Infectious w/u negative thus far, patient non toxic appearing - if patient respikes will plan for CT C/A/P.  Dental consult placed to rule out abscess as infectious source.  Daughter Marysol was at bedside for AM rounds and involved in plan of care.  75 yo F PMH T2DM on insulin, HTN, HLD, hypothyroidism, RA on Prednisone, Fibromyalgia, cerebral aneurysm s/p repair, chronic hypercapnic respiratory failure s/p trach (vent dependent) and Chronic PEG 2022 , recurrent C.diff infection (follows with Dr. Newman) , bedbound who presented from home with complaints of possible G tube dislodgement and redness around the site. Had CT Abdomen/Pelvis done showing dislodgement of G tube with balloon in the anterior abdominal wall with air filled track to stomach. Admitted to MICU for ventilator management and given vancomycin/meropenem empirically for treatment of cellulitis. Per family patient has had Known c diff infection for past 2-3 weeks.  Treating with Fidaxomicin.  IR team exchanged PEG on 4/3 however later in the day it remained with leakage.  IR Attending adjusted PEG at bedside on 4/4 and PEG remained with moderate amount of PEG leakage once feeds initiated at reduced rate.       4/8:  PEG still with drainage, on IR schedule for tomorrow - will make NPO after midnight - IR to come and discuss plan/consent in AM of 4/9.  Afebrile - continuing meropenem - fidaxomicin completed x10 days, restarted vanco PO as per ID reccs.  Infectious w/u negative thus far, patient non toxic appearing - if patient respikes will plan for CT C/A/P.  Dental consult placed to rule out abscess as infectious source.  Daughter Marysol was at bedside for AM rounds and involved in plan of care.

## 2024-04-08 NOTE — PROGRESS NOTE ADULT - SUBJECTIVE AND OBJECTIVE BOX
Patient is a 74y old  Female who presents with a chief complaint of Dislodged G Tube (2024 08:42)      Interval Events:    REVIEW OF SYSTEMS:  [ ] Positive  [ ] All other systems negative  [ ] Unable to assess ROS because ________    Vital Signs Last 24 Hrs  T(C): 37.1 (24 @ 05:31), Max: 37.7 (24 @ 12:00)  T(F): 98.8 (24 @ 05:31), Max: 99.9 (24 @ 12:00)  HR: 76 (24 @ 05:53) (76 - 111)  BP: 128/60 (24 @ 05:31) (97/50 - 153/71)  RR: 18 (24 @ 05:31) (18 - 23)  SpO2: 99% (24 @ 05:53) (96% - 100%)    PHYSICAL EXAM:  HEENT:   [ ]Tracheostomy:  [ ]Pupils equal  [ ]No oral lesions  [ ]Abnormal    SKIN  [ ]No Rash  [ ] Abnormal  [ ] pressure    CARDIAC  [ ]Regular  [ ]Abnormal    PULMONARY  [ ]Bilateral Clear Breath Sounds  [ ]Normal Excursion  [ ]Abnormal    GI  [ ]PEG      [ ] +BS		              [ ]Soft, nondistended, nontender	  [ ]Abnormal    MUSCULOSKELETAL                                   [ ]Bedbound                 [ ]Abnormal    [ ]Ambulatory/OOB to chair                           EXTREMITIES                                         [ ]Normal  [ ]Edema                           NEUROLOGIC  [ ] Normal, non focal  [ ] Focal findings:    PSYCHIATRIC  [ ]Alert and appropriate  [ ] Sedated	 [ ]Agitated    :  Mcbride: [ ] Yes, if yes: Date of Placement:                   [  ] No    LINES: Central Lines [ ] Yes, if yes: Date of Placement                                     [  ] No    HOSPITAL MEDICATIONS:  MEDICATIONS  (STANDING):  albuterol/ipratropium for Nebulization 3 milliLiter(s) Nebulizer every 6 hours  amLODIPine   Tablet 10 milliGRAM(s) Oral <User Schedule>  artificial tears (preservative free) Ophthalmic Solution 2 Drop(s) Both EYES every 6 hours  ascorbic acid 500 milliGRAM(s) Oral daily  Biotene Dry Mouth Oral Rinse 5 milliLiter(s) Swish and Spit every 6 hours  chlorhexidine 0.12% Liquid 15 milliLiter(s) Oral Mucosa every 12 hours  chlorhexidine 4% Liquid 1 Application(s) Topical <User Schedule>  escitalopram 10 milliGRAM(s) Oral <User Schedule>  fentaNYL   Patch  25 MICROgram(s)/Hr 1 Patch Transdermal every 72 hours  gabapentin Solution 100 milliGRAM(s) Oral <User Schedule>  insulin glargine Injectable (LANTUS) 7 Unit(s) SubCutaneous <User Schedule>  insulin lispro (ADMELOG) corrective regimen sliding scale   SubCutaneous every 6 hours  lactated ringers. 1000 milliLiter(s) (50 mL/Hr) IV Continuous <Continuous>  levothyroxine Injectable 87.5 MICROGram(s) IV Push <User Schedule>  lidocaine   4% Patch 1 Patch Transdermal at bedtime  lidocaine   4% Patch 1 Patch Transdermal at bedtime  melatonin Liquid 3 milliGRAM(s) Oral at bedtime  meropenem  IVPB      meropenem  IVPB 1000 milliGRAM(s) IV Intermittent every 8 hours  multivitamin 1 Tablet(s) Oral daily  pantoprazole  Injectable 40 milliGRAM(s) IV Push <User Schedule>  predniSONE  Solution 5 milliGRAM(s) Oral <User Schedule>  QUEtiapine 12.5 milliGRAM(s) Oral <User Schedule>  simethicone 80 milliGRAM(s) Chew every 12 hours  tobramycin 0.3% Ophthalmic Solution 2 Drop(s) Both EYES every 6 hours    MEDICATIONS  (PRN):  acetaminophen   Oral Liquid .. 1000 milliGRAM(s) Oral every 6 hours PRN Temp greater or equal to 38C (100.4F), Mild Pain (1 - 3)  ondansetron Injectable 4 milliGRAM(s) IV Push every 8 hours PRN Nausea and/or Vomiting  petrolatum Ophthalmic Ointment 1 Application(s) Both EYES every 6 hours PRN eye dryness  sodium chloride 0.65% Nasal 1 Spray(s) Both Nostrils every 12 hours PRN Congestion  traMADol 25 milliGRAM(s) Oral every 8 hours PRN Mild Pain (1 - 3)      LABS:                        7.8    5.47  )-----------( 105      ( 2024 06:44 )             27.4     04-    138  |  102  |  8   ----------------------------<  206<H>  3.9   |  25  |  <0.30<L>    Ca    8.7      2024 06:44  Phos  2.0     04-07  Mg     1.6     04-07    TPro  6.8  /  Alb  3.1<L>  /  TBili  0.4  /  DBili  x   /  AST  21  /  ALT  21  /  AlkPhos  47  04-07      Urinalysis Basic - ( 2024 07:18 )    Color: Yellow / Appearance: Clear / S.010 / pH: x  Gluc: x / Ketone: Trace mg/dL  / Bili: Negative / Urobili: 0.2 mg/dL   Blood: x / Protein: Negative mg/dL / Nitrite: Negative   Leuk Esterase: Negative / RBC: 20 /HPF / WBC 1 /HPF   Sq Epi: x / Non Sq Epi: 1 /HPF / Bacteria: Occasional /HPF          CAPILLARY BLOOD GLUCOSE    MICROBIOLOGY:     RADIOLOGY:  [ ] Reviewed and interpreted by me    Mode: AC/ CMV (Assist Control/ Continuous Mandatory Ventilation)  RR (machine): 12  TV (machine): 300  FiO2: 30  PEEP: 5  ITime: 1  MAP: 7  PIP: 28   Patient is a 74y old  Female who presents with a chief complaint of Dislodged G Tube (2024 08:42)      Interval Events:  No acute events overnight.     REVIEW OF SYSTEMS:  [ ] Positive  [ ] All other systems negative  [X] Unable to assess ROS because ___minimally communicative 2/2 lethargy____    Vital Signs Last 24 Hrs  T(C): 37.1 (24 @ 05:31), Max: 37.7 (24 @ 12:00)  T(F): 98.8 (24 @ 05:31), Max: 99.9 (24 @ 12:00)  HR: 76 (24 @ 05:53) (76 - 111)  BP: 128/60 (24 @ 05:31) (97/50 - 153/71)  RR: 18 (24 @ 05:31) (18 - 23)  SpO2: 99% (24 @ 05:53) (96% - 100%)    PHYSICAL EXAM:  HEENT:   [X]Tracheostomy: #8 distal XLT cuffed shiley   [X]Pupils equal  [ ]No oral lesions  [ ]Abnormal    SKIN  [ ]No Rash  [ ] Abnormal  [X] pressure - sacral pressure injury    CARDIAC  [X]Regular  [ ]Abnormal    PULMONARY  [ ]Bilateral Clear Breath Sounds  [ ]Normal Excursion  [X]Abnormal - BL Coarse BS    GI  [X]PEG      [X] +BS		              [X]Soft, slightly distended, nontender	  [X]Abnormal - + PEG w/ leakage, no redness at site; + rectal tube     MUSCULOSKELETAL                                   [X]Bedbound                 [ ]Abnormal    [ ]Ambulatory/OOB to chair                           EXTREMITIES                                         [X]Normal  [ ]Edema                           NEUROLOGIC  [ ] Normal, non focal  [X] Focal findings: lethargic this am, + following commands on all 4 extremited    PSYCHIATRIC  [ ]Alert and appropriate  [ ] Sedated	 [ ]Agitated    :  Mcbride: [ ] Yes, if yes: Date of Placement:                   [X] No    LINES: Central Lines [ ] Yes, if yes: Date of Placement                                     [X] No    HOSPITAL MEDICATIONS:  MEDICATIONS  (STANDING):  albuterol/ipratropium for Nebulization 3 milliLiter(s) Nebulizer every 6 hours  amLODIPine   Tablet 10 milliGRAM(s) Oral <User Schedule>  artificial tears (preservative free) Ophthalmic Solution 2 Drop(s) Both EYES every 6 hours  ascorbic acid 500 milliGRAM(s) Oral daily  Biotene Dry Mouth Oral Rinse 5 milliLiter(s) Swish and Spit every 6 hours  chlorhexidine 0.12% Liquid 15 milliLiter(s) Oral Mucosa every 12 hours  chlorhexidine 4% Liquid 1 Application(s) Topical <User Schedule>  escitalopram 10 milliGRAM(s) Oral <User Schedule>  fentaNYL   Patch  25 MICROgram(s)/Hr 1 Patch Transdermal every 72 hours  gabapentin Solution 100 milliGRAM(s) Oral <User Schedule>  insulin glargine Injectable (LANTUS) 7 Unit(s) SubCutaneous <User Schedule>  insulin lispro (ADMELOG) corrective regimen sliding scale   SubCutaneous every 6 hours  lactated ringers. 1000 milliLiter(s) (50 mL/Hr) IV Continuous <Continuous>  levothyroxine Injectable 87.5 MICROGram(s) IV Push <User Schedule>  lidocaine   4% Patch 1 Patch Transdermal at bedtime  lidocaine   4% Patch 1 Patch Transdermal at bedtime  melatonin Liquid 3 milliGRAM(s) Oral at bedtime  meropenem  IVPB      meropenem  IVPB 1000 milliGRAM(s) IV Intermittent every 8 hours  multivitamin 1 Tablet(s) Oral daily  pantoprazole  Injectable 40 milliGRAM(s) IV Push <User Schedule>  predniSONE  Solution 5 milliGRAM(s) Oral <User Schedule>  QUEtiapine 12.5 milliGRAM(s) Oral <User Schedule>  simethicone 80 milliGRAM(s) Chew every 12 hours  tobramycin 0.3% Ophthalmic Solution 2 Drop(s) Both EYES every 6 hours    MEDICATIONS  (PRN):  acetaminophen   Oral Liquid .. 1000 milliGRAM(s) Oral every 6 hours PRN Temp greater or equal to 38C (100.4F), Mild Pain (1 - 3)  ondansetron Injectable 4 milliGRAM(s) IV Push every 8 hours PRN Nausea and/or Vomiting  petrolatum Ophthalmic Ointment 1 Application(s) Both EYES every 6 hours PRN eye dryness  sodium chloride 0.65% Nasal 1 Spray(s) Both Nostrils every 12 hours PRN Congestion  traMADol 25 milliGRAM(s) Oral every 8 hours PRN Mild Pain (1 - 3)      LABS:                        7.8    5.47  )-----------( 105      ( 2024 06:44 )             27.4     04-07    138  |  102  |  8   ----------------------------<  206<H>  3.9   |  25  |  <0.30<L>    Ca    8.7      2024 06:44  Phos  2.0     04-07  Mg     1.6     04-07    TPro  6.8  /  Alb  3.1<L>  /  TBili  0.4  /  DBili  x   /  AST  21  /  ALT  21  /  AlkPhos  47  04-07      Urinalysis Basic - ( 2024 07:18 )    Color: Yellow / Appearance: Clear / S.010 / pH: x  Gluc: x / Ketone: Trace mg/dL  / Bili: Negative / Urobili: 0.2 mg/dL   Blood: x / Protein: Negative mg/dL / Nitrite: Negative   Leuk Esterase: Negative / RBC: 20 /HPF / WBC 1 /HPF   Sq Epi: x / Non Sq Epi: 1 /HPF / Bacteria: Occasional /HPF          CAPILLARY BLOOD GLUCOSE    MICROBIOLOGY:     RADIOLOGY:  [ ] Reviewed and interpreted by me    Mode: AC/ CMV (Assist Control/ Continuous Mandatory Ventilation)  RR (machine): 12  TV (machine): 300  FiO2: 30  PEEP: 5  ITime: 1  MAP: 7  PIP: 28

## 2024-04-08 NOTE — PROGRESS NOTE ADULT - PROBLEM SELECTOR PLAN 4
Chronic Hypoxemic/Hypercapnic Respiratory Failure  Chronic Trach/Vent dependant 2/2 Hypercapnic Resp Failure since 10/2022   - S/p Trach # 8 Distal XLT Shiley Cuffed   - Home Vent: volume /12/30%/+5  - Trach Care and suction PRN

## 2024-04-08 NOTE — PROGRESS NOTE ADULT - PROBLEM SELECTOR PLAN 3
intermittently persistent high fevers  - IV Vanco/meropenem x1 on admission for concern for cellulitis  - 4/2 blood cultures negative x2, MRSA/MSSA PCR +  - 4/3 urine culture negative, CT A/O - no infectious focus or colitis  - 4/5 Procal 6.6 increased from 0.08 on 4/3, lactate 4.3  - 4/7 fever, meropenem restarted - no leukocytosis - RVP pending, UA neg, dopplers ordered, continue boone during infectious workup as per ID   - ID recs appreciated

## 2024-04-08 NOTE — PROGRESS NOTE ADULT - SUBJECTIVE AND OBJECTIVE BOX
infectious diseases progress note:    Patient is a 74y old  Female who presents with a chief complaint of Dislodged G Tube (08 Apr 2024 07:16)        Cellulitis of abdominal wall               Allergies    pineapple (Unknown)  Tagamet (Unknown)  heparin (Unknown)  walnut (Unknown)  metronidazole (Rash)  penicillin (Unknown)  Lyrica (Unknown)  Pecan, Filbert, Hazelnut (Unknown)    Intolerances        ANTIBIOTICS/RELEVANT:  antimicrobials  meropenem  IVPB 1000 milliGRAM(s) IV Intermittent every 8 hours  meropenem  IVPB        immunologic:    OTHER:  acetaminophen   Oral Liquid .. 1000 milliGRAM(s) Oral every 6 hours PRN  albuterol/ipratropium for Nebulization 3 milliLiter(s) Nebulizer every 6 hours  amLODIPine   Tablet 10 milliGRAM(s) Oral <User Schedule>  artificial tears (preservative free) Ophthalmic Solution 2 Drop(s) Both EYES every 6 hours  ascorbic acid 500 milliGRAM(s) Oral daily  Biotene Dry Mouth Oral Rinse 5 milliLiter(s) Swish and Spit every 6 hours  chlorhexidine 0.12% Liquid 15 milliLiter(s) Oral Mucosa every 12 hours  chlorhexidine 4% Liquid 1 Application(s) Topical <User Schedule>  escitalopram 10 milliGRAM(s) Oral <User Schedule>  fentaNYL   Patch  25 MICROgram(s)/Hr 1 Patch Transdermal every 72 hours  gabapentin Solution 100 milliGRAM(s) Oral <User Schedule>  insulin glargine Injectable (LANTUS) 7 Unit(s) SubCutaneous <User Schedule>  insulin lispro (ADMELOG) corrective regimen sliding scale   SubCutaneous every 6 hours  lactated ringers. 1000 milliLiter(s) IV Continuous <Continuous>  levothyroxine Injectable 87.5 MICROGram(s) IV Push <User Schedule>  lidocaine   4% Patch 1 Patch Transdermal at bedtime  lidocaine   4% Patch 1 Patch Transdermal at bedtime  magnesium sulfate  IVPB 2 Gram(s) IV Intermittent once  melatonin Liquid 3 milliGRAM(s) Oral at bedtime  multivitamin 1 Tablet(s) Oral daily  ondansetron Injectable 4 milliGRAM(s) IV Push every 8 hours PRN  pantoprazole  Injectable 40 milliGRAM(s) IV Push <User Schedule>  petrolatum Ophthalmic Ointment 1 Application(s) Both EYES every 6 hours PRN  predniSONE  Solution 5 milliGRAM(s) Oral <User Schedule>  QUEtiapine 12.5 milliGRAM(s) Oral <User Schedule>  simethicone 80 milliGRAM(s) Chew every 12 hours  sodium chloride 0.65% Nasal 1 Spray(s) Both Nostrils every 12 hours PRN  tobramycin 0.3% Ophthalmic Solution 2 Drop(s) Both EYES every 6 hours  traMADol 25 milliGRAM(s) Oral every 8 hours PRN      Objective:  Vital Signs Last 24 Hrs  T(C): 37.1 (08 Apr 2024 05:31), Max: 37.7 (07 Apr 2024 12:00)  T(F): 98.8 (08 Apr 2024 05:31), Max: 99.9 (07 Apr 2024 12:00)  HR: 76 (08 Apr 2024 05:53) (76 - 111)  BP: 128/60 (08 Apr 2024 05:31) (97/50 - 153/71)  BP(mean): --  RR: 18 (08 Apr 2024 05:31) (18 - 23)  SpO2: 99% (08 Apr 2024 05:53) (96% - 100%)    Parameters below as of 08 Apr 2024 05:53  Patient On (Oxygen Delivery Method): ventilator         Eyes:NUBIA, EOMI  Ear/Nose/Throat: no oral lesion, no sinus tenderness on percussion	  Neck:no JVD, no lymphadenopathy, supple  Respiratory: CTA ruth  Cardiovascular: S1S2 RRR, no murmurs  Gastrointestinal:soft, (+) BS, no HSM  Extremities:no e/e/c        LABS:                        7.3    5.27  )-----------( 100      ( 08 Apr 2024 06:49 )             25.0     04-08    140  |  103  |  12  ----------------------------<  122<H>  3.6   |  26  |  <0.30<L>    Ca    8.3<L>      08 Apr 2024 06:49  Phos  2.8     04-08  Mg     1.9     04-08    TPro  6.3  /  Alb  2.8<L>  /  TBili  0.3  /  DBili  x   /  AST  16  /  ALT  19  /  AlkPhos  45  04-08      Urinalysis Basic - ( 08 Apr 2024 06:49 )    Color: x / Appearance: x / SG: x / pH: x  Gluc: 122 mg/dL / Ketone: x  / Bili: x / Urobili: x   Blood: x / Protein: x / Nitrite: x   Leuk Esterase: x / RBC: x / WBC x   Sq Epi: x / Non Sq Epi: x / Bacteria: x          MICROBIOLOGY:    RECENT CULTURES:  04-07 @ 14:31 .Sputum Sputum       Numerous polymorphonuclear leukocytes per low power field  Few Squamous epithelial cells per low power field  Numerous Gram Negative Rods per oil power field  Few Gram Positive Rods per oil power field             04-05 @ 06:43 .Sputum Sputum   LENIN    Few polymorphonuclear leukocytes per low power field  Few Squamous epithelial cells per low power field  Numerous Gram Negative Rods seen per oil power field  Moderate Gram Positive Rods seen per oil power field    Pseudomonas aeruginosa  Pseudomonas aeruginosa     Numerous Pseudomonas aeruginosa  Normal Respiratory Shanda present    04-04 @ 21:00 .Blood Blood-Peripheral                No growth at 72 Hours    04-03 @ 02:07 Clean Catch Clean Catch (Midstream)                <10,000 CFU/mL Normal Urogenital Shanda    04-02 @ 23:39 .Blood Blood-Peripheral                No growth at 5 days          RESPIRATORY CULTURES:              RADIOLOGY & ADDITIONAL STUDIES:        Pager [phone]  After 5 pm/weekends or if no response :[phone]

## 2024-04-09 DIAGNOSIS — D69.6 THROMBOCYTOPENIA, UNSPECIFIED: ICD-10-CM

## 2024-04-09 LAB
-  LEVOFLOXACIN: SIGNIFICANT CHANGE UP
-  MINOCYCLINE: SIGNIFICANT CHANGE UP
-  TRIMETHOPRIM/SULFAMETHOXAZOLE: SIGNIFICANT CHANGE UP
ALBUMIN SERPL ELPH-MCNC: 2.9 G/DL — LOW (ref 3.3–5)
ALP SERPL-CCNC: 46 U/L — SIGNIFICANT CHANGE UP (ref 40–120)
ALT FLD-CCNC: 20 U/L — SIGNIFICANT CHANGE UP (ref 10–45)
ANION GAP SERPL CALC-SCNC: 13 MMOL/L — SIGNIFICANT CHANGE UP (ref 5–17)
AST SERPL-CCNC: 25 U/L — SIGNIFICANT CHANGE UP (ref 10–40)
BILIRUB SERPL-MCNC: 0.2 MG/DL — SIGNIFICANT CHANGE UP (ref 0.2–1.2)
BLD GP AB SCN SERPL QL: POSITIVE — SIGNIFICANT CHANGE UP
BUN SERPL-MCNC: 13 MG/DL — SIGNIFICANT CHANGE UP (ref 7–23)
CALCIUM SERPL-MCNC: 8.7 MG/DL — SIGNIFICANT CHANGE UP (ref 8.4–10.5)
CHLORIDE SERPL-SCNC: 102 MMOL/L — SIGNIFICANT CHANGE UP (ref 96–108)
CO2 SERPL-SCNC: 26 MMOL/L — SIGNIFICANT CHANGE UP (ref 22–31)
CREAT SERPL-MCNC: 0.17 MG/DL — LOW (ref 0.5–1.3)
EGFR: 129 ML/MIN/1.73M2 — SIGNIFICANT CHANGE UP
GLUCOSE BLDC GLUCOMTR-MCNC: 111 MG/DL — HIGH (ref 70–99)
GLUCOSE BLDC GLUCOMTR-MCNC: 128 MG/DL — HIGH (ref 70–99)
GLUCOSE BLDC GLUCOMTR-MCNC: 159 MG/DL — HIGH (ref 70–99)
GLUCOSE BLDC GLUCOMTR-MCNC: 162 MG/DL — HIGH (ref 70–99)
GLUCOSE SERPL-MCNC: 155 MG/DL — HIGH (ref 70–99)
GRAM STN FLD: ABNORMAL
HCT VFR BLD CALC: 25.7 % — LOW (ref 34.5–45)
HGB BLD-MCNC: 7.2 G/DL — LOW (ref 11.5–15.5)
MAGNESIUM SERPL-MCNC: 2 MG/DL — SIGNIFICANT CHANGE UP (ref 1.6–2.6)
MCHC RBC-ENTMCNC: 24.6 PG — LOW (ref 27–34)
MCHC RBC-ENTMCNC: 28 GM/DL — LOW (ref 32–36)
MCV RBC AUTO: 87.7 FL — SIGNIFICANT CHANGE UP (ref 80–100)
METHOD TYPE: SIGNIFICANT CHANGE UP
NRBC # BLD: 0 /100 WBCS — SIGNIFICANT CHANGE UP (ref 0–0)
PHOSPHATE SERPL-MCNC: 2.7 MG/DL — SIGNIFICANT CHANGE UP (ref 2.5–4.5)
PLATELET # BLD AUTO: 106 K/UL — LOW (ref 150–400)
POTASSIUM SERPL-MCNC: 4 MMOL/L — SIGNIFICANT CHANGE UP (ref 3.5–5.3)
POTASSIUM SERPL-SCNC: 4 MMOL/L — SIGNIFICANT CHANGE UP (ref 3.5–5.3)
PROT SERPL-MCNC: 6.4 G/DL — SIGNIFICANT CHANGE UP (ref 6–8.3)
RBC # BLD: 2.93 M/UL — LOW (ref 3.8–5.2)
RBC # FLD: 18.6 % — HIGH (ref 10.3–14.5)
RH IG SCN BLD-IMP: POSITIVE — SIGNIFICANT CHANGE UP
S MARCESCENS DNA BLD POS NAA+NON-PROBE: SIGNIFICANT CHANGE UP
SODIUM SERPL-SCNC: 140 MMOL/L — SIGNIFICANT CHANGE UP (ref 135–145)
WBC # BLD: 4.38 K/UL — SIGNIFICANT CHANGE UP (ref 3.8–10.5)
WBC # FLD AUTO: 4.38 K/UL — SIGNIFICANT CHANGE UP (ref 3.8–10.5)

## 2024-04-09 PROCEDURE — 99232 SBSQ HOSP IP/OBS MODERATE 35: CPT

## 2024-04-09 PROCEDURE — 99233 SBSQ HOSP IP/OBS HIGH 50: CPT | Mod: FS

## 2024-04-09 RX ORDER — ACETAMINOPHEN 500 MG
1000 TABLET ORAL ONCE
Refills: 0 | Status: COMPLETED | OUTPATIENT
Start: 2024-04-09 | End: 2024-04-09

## 2024-04-09 RX ORDER — CALAMINE AND ZINC OXIDE AND PHENOL 160; 10 MG/ML; MG/ML
1 LOTION TOPICAL DAILY
Refills: 0 | Status: DISCONTINUED | OUTPATIENT
Start: 2024-04-09 | End: 2024-05-01

## 2024-04-09 RX ORDER — FENTANYL CITRATE 50 UG/ML
1 INJECTION INTRAVENOUS
Refills: 0 | Status: DISCONTINUED | OUTPATIENT
Start: 2024-04-09 | End: 2024-04-09

## 2024-04-09 RX ADMIN — MEROPENEM 100 MILLIGRAM(S): 1 INJECTION INTRAVENOUS at 06:12

## 2024-04-09 RX ADMIN — LIDOCAINE 1 PATCH: 4 CREAM TOPICAL at 11:00

## 2024-04-09 RX ADMIN — PANTOPRAZOLE SODIUM 40 MILLIGRAM(S): 20 TABLET, DELAYED RELEASE ORAL at 06:12

## 2024-04-09 RX ADMIN — Medication 3 MILLILITER(S): at 18:05

## 2024-04-09 RX ADMIN — Medication 2 DROP(S): at 14:24

## 2024-04-09 RX ADMIN — CHLORHEXIDINE GLUCONATE 1 APPLICATION(S): 213 SOLUTION TOPICAL at 06:13

## 2024-04-09 RX ADMIN — Medication 1: at 12:19

## 2024-04-09 RX ADMIN — SODIUM CHLORIDE 50 MILLILITER(S): 9 INJECTION, SOLUTION INTRAVENOUS at 00:01

## 2024-04-09 RX ADMIN — LIDOCAINE 1 PATCH: 4 CREAM TOPICAL at 21:03

## 2024-04-09 RX ADMIN — LIDOCAINE 1 PATCH: 4 CREAM TOPICAL at 07:58

## 2024-04-09 RX ADMIN — NYSTATIN CREAM 1 APPLICATION(S): 100000 CREAM TOPICAL at 00:01

## 2024-04-09 RX ADMIN — CHLORHEXIDINE GLUCONATE 15 MILLILITER(S): 213 SOLUTION TOPICAL at 18:00

## 2024-04-09 RX ADMIN — Medication 15 MILLILITER(S): at 12:18

## 2024-04-09 RX ADMIN — Medication 2 DROP(S): at 21:03

## 2024-04-09 RX ADMIN — Medication 1 SPRAY(S): at 00:00

## 2024-04-09 RX ADMIN — Medication 500 MILLIGRAM(S): at 12:18

## 2024-04-09 RX ADMIN — CHLORHEXIDINE GLUCONATE 15 MILLILITER(S): 213 SOLUTION TOPICAL at 06:11

## 2024-04-09 RX ADMIN — MEROPENEM 100 MILLIGRAM(S): 1 INJECTION INTRAVENOUS at 14:49

## 2024-04-09 RX ADMIN — Medication 1 APPLICATION(S): at 17:59

## 2024-04-09 RX ADMIN — Medication 400 MILLIGRAM(S): at 14:58

## 2024-04-09 RX ADMIN — Medication 2 DROP(S): at 02:42

## 2024-04-09 RX ADMIN — Medication 125 MILLIGRAM(S): at 12:17

## 2024-04-09 RX ADMIN — Medication 1: at 18:00

## 2024-04-09 RX ADMIN — Medication 87.5 MICROGRAM(S): at 04:41

## 2024-04-09 RX ADMIN — SIMETHICONE 80 MILLIGRAM(S): 80 TABLET, CHEWABLE ORAL at 06:11

## 2024-04-09 RX ADMIN — Medication 5 MILLIGRAM(S): at 09:28

## 2024-04-09 RX ADMIN — SODIUM CHLORIDE 50 MILLILITER(S): 9 INJECTION, SOLUTION INTRAVENOUS at 21:02

## 2024-04-09 RX ADMIN — MEROPENEM 100 MILLIGRAM(S): 1 INJECTION INTRAVENOUS at 21:02

## 2024-04-09 RX ADMIN — Medication 2 DROP(S): at 18:01

## 2024-04-09 RX ADMIN — Medication 5 MILLILITER(S): at 12:20

## 2024-04-09 RX ADMIN — Medication 2 DROP(S): at 12:18

## 2024-04-09 RX ADMIN — AMLODIPINE BESYLATE 10 MILLIGRAM(S): 2.5 TABLET ORAL at 09:27

## 2024-04-09 RX ADMIN — LIDOCAINE 1 PATCH: 4 CREAM TOPICAL at 07:30

## 2024-04-09 RX ADMIN — Medication 2 DROP(S): at 09:28

## 2024-04-09 RX ADMIN — Medication 2 DROP(S): at 06:12

## 2024-04-09 RX ADMIN — Medication 3 MILLILITER(S): at 05:24

## 2024-04-09 RX ADMIN — Medication 125 MILLIGRAM(S): at 06:11

## 2024-04-09 RX ADMIN — SODIUM CHLORIDE 50 MILLILITER(S): 9 INJECTION, SOLUTION INTRAVENOUS at 12:18

## 2024-04-09 RX ADMIN — Medication 5 MILLILITER(S): at 06:12

## 2024-04-09 RX ADMIN — Medication 3 MILLILITER(S): at 23:31

## 2024-04-09 RX ADMIN — Medication 5 MILLILITER(S): at 18:01

## 2024-04-09 RX ADMIN — GABAPENTIN 100 MILLIGRAM(S): 400 CAPSULE ORAL at 09:27

## 2024-04-09 NOTE — CONSULT NOTE ADULT - SUBJECTIVE AND OBJECTIVE BOX
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  Site is red with small amount of pus at insertion site.  Tender to palpation, warm to touch. CT Abdomen/Pelvis done showing dislodgement of G tube with balloon in the anterior abdominal wall with air filled track to stomach.  GI contacted, but unable to replace at bedside given placement of balloon. Surgery consulted--recommend gastrograffin study to eval placement of PEG and possible IR replacement in AM. Given Vancomycin 1 gm IV x 1 due to concern for possible cellulitis.  Of note, patient hemodynamically stable, afebrile, without leukocytosis on presentation. Per patient's son at bedside, patient currently being treated for C-diff.  Known infection for past 2-3 weeks.  Treated by Dr. Zion Newman, currently receiving Fidaxomicin.  Also report recent UTI--not currently being treated. Admitted to MICU for ventilator support and treatment of dislodged PEG/abdominal wall cellulitis.  (02 Apr 2024 23:07)  GI Consulted for peg replacement. Pt known to us from previous admissions with leaking peg/peg malfunction with no relief with multiple interventions, inclusive of now upsized tube with IR on this admission.       pineapple (Unknown)  Tagamet (Unknown)  heparin (Unknown)  walnut (Unknown)  metronidazole (Rash)  penicillin (Unknown)  Lyrica (Unknown)  Pecan, Filbert, Hazelnut (Unknown)      acetaminophen   Oral Liquid .. 1000 milliGRAM(s) Oral every 6 hours PRN  albuterol/ipratropium for Nebulization 3 milliLiter(s) Nebulizer every 6 hours  amLODIPine   Tablet 10 milliGRAM(s) Oral <User Schedule>  artificial tears (preservative free) Ophthalmic Solution 2 Drop(s) Both EYES every 6 hours  ascorbic acid 500 milliGRAM(s) Oral daily  Biotene Dry Mouth Oral Rinse 5 milliLiter(s) Swish and Spit every 6 hours  chlorhexidine 0.12% Liquid 15 milliLiter(s) Oral Mucosa every 12 hours  chlorhexidine 4% Liquid 1 Application(s) Topical <User Schedule>  escitalopram 10 milliGRAM(s) Oral <User Schedule>  fentaNYL   Patch  25 MICROgram(s)/Hr 1 Patch Transdermal every 72 hours  gabapentin Solution 100 milliGRAM(s) Oral <User Schedule>  insulin glargine Injectable (LANTUS) 7 Unit(s) SubCutaneous <User Schedule>  insulin lispro (ADMELOG) corrective regimen sliding scale   SubCutaneous every 6 hours  lactated ringers. 1000 milliLiter(s) IV Continuous <Continuous>  levothyroxine Injectable 87.5 MICROGram(s) IV Push <User Schedule>  lidocaine   4% Patch 1 Patch Transdermal at bedtime  lidocaine   4% Patch 1 Patch Transdermal at bedtime  melatonin Liquid 3 milliGRAM(s) Oral at bedtime  meropenem  IVPB      meropenem  IVPB 1000 milliGRAM(s) IV Intermittent every 8 hours  multivitamin/minerals/iron Oral Solution (CENTRUM) 15 milliLiter(s) Oral daily  ondansetron Injectable 4 milliGRAM(s) IV Push every 8 hours PRN  pantoprazole  Injectable 40 milliGRAM(s) IV Push <User Schedule>  petrolatum Ophthalmic Ointment 1 Application(s) Both EYES every 6 hours PRN  predniSONE  Solution 5 milliGRAM(s) Oral <User Schedule>  QUEtiapine 12.5 milliGRAM(s) Oral <User Schedule>  simethicone 80 milliGRAM(s) Chew every 12 hours  sodium chloride 0.65% Nasal 1 Spray(s) Both Nostrils every 12 hours PRN  sodium chloride 0.65% Nasal 1 Spray(s) Both Nostrils two times a day PRN  tobramycin 0.3% Ophthalmic Solution 2 Drop(s) Both EYES every 6 hours  traMADol 25 milliGRAM(s) Oral every 8 hours PRN  vancomycin    Solution 125 milliGRAM(s) Oral every 6 hours        FAMILY HISTORY:        Review of Systems:    General:  No wt loss, fevers, chills, night sweats, fatigue  Eyes:  Good vision, no reported pain  ENT:  No sore throat, pain, runny nose, dysphagia  CV:  No pain, palpitations, no lightheadedness  Resp:  No dyspnea, cough, tachypnea, wheezing  GI: per hpi  :  No pain, bleeding, incontinence, nocturia  Muscle:  No pain, weakness  Neuro:  No weakness, tingling, memory problems  Psych:  No fatigue, insomnia, mood problems, depression  Endocrine:  No polyuria, polydypsia, cold/heat intolerance  Heme:  No petechiae, ecchymosis, easy bruisability  Skin:  No rash, tattoos, scars, edema    Relevant Family History:   n/c    Relevant Social History: n/c      Physical Exam:    Vital Signs:  Vital Signs Last 24 Hrs  T(C): 37.2 (09 Apr 2024 06:00), Max: 37.2 (09 Apr 2024 06:00)  T(F): 98.9 (09 Apr 2024 06:00), Max: 98.9 (09 Apr 2024 06:00)  HR: 89 (09 Apr 2024 09:24) (78 - 89)  BP: 99/50 (09 Apr 2024 06:00) (99/50 - 139/65)  BP(mean): --  RR: 16 (09 Apr 2024 06:00) (16 - 22)  SpO2: 100% (09 Apr 2024 09:24) (97% - 100%)    Parameters below as of 09 Apr 2024 06:11  Patient On (Oxygen Delivery Method): ventilator      Daily     Daily     General:  Appears stated age, well-groomed, nad  HEENT:  NC/AT,  conjunctivae clear and pink, no thyromegaly, nodules, adenopathy, no JVD  Chest:  Full & symmetric excursion, no increased effort, breath sounds clear  Cardiovascular:  Regular rhythm, S1, S2, no murmur/rub/S3/S4, no abdominal bruit, no edema  Abdomen:  Soft, non-tender, non-distended, normoactive bowel sounds,  no masses ,no hepatosplenomeagaly, no signs of chronic liver disease  Extremities:  no cyanosis,clubbing or edema  Skin:  No rash/erythema/ecchymoses/petechiae/wounds/abscess/warm/dry  Neuro/Psych:  A&O, no asterixis, no tremor, no encephalopathy    Laboratory:                            7.2    4.38  )-----------( 106      ( 09 Apr 2024 01:10 )             25.7     04-09    140  |  102  |  13  ----------------------------<  155<H>  4.0   |  26  |  0.17<L>    Ca    8.7      09 Apr 2024 01:10  Phos  2.7     04-09  Mg     2.0     04-09    TPro  6.4  /  Alb  2.9<L>  /  TBili  0.2  /  DBili  x   /  AST  25  /  ALT  20  /  AlkPhos  46  04-09    LIVER FUNCTIONS - ( 09 Apr 2024 01:10 )  Alb: 2.9 g/dL / Pro: 6.4 g/dL / ALK PHOS: 46 U/L / ALT: 20 U/L / AST: 25 U/L / GGT: x             Urinalysis Basic - ( 09 Apr 2024 01:10 )    Color: x / Appearance: x / SG: x / pH: x  Gluc: 155 mg/dL / Ketone: x  / Bili: x / Urobili: x   Blood: x / Protein: x / Nitrite: x   Leuk Esterase: x / RBC: x / WBC x   Sq Epi: x / Non Sq Epi: x / Bacteria: x        Imaging:

## 2024-04-09 NOTE — PROGRESS NOTE ADULT - PROBLEM SELECTOR PLAN 3
intermittently persistent high fevers  - IV Vanco/meropenem x1 on admission for concern for cellulitis  - 4/2 blood cultures negative x2, MRSA/MSSA PCR +  - 4/3 urine culture negative, CT A/O - no infectious focus or colitis  - 4/5 Procal 6.6 increased from 0.08 on 4/3, lactate 4.3  - 4/7 fever, meropenem restarted - no leukocytosis - RVP pending, UA neg, dopplers ordered, continue boone during infectious workup as per ID   - ID recs appreciated intermittently persistent high fevers  - IV Vanco/meropenem x1 on admission for concern for cellulitis  - 4/2 blood cultures negative x2, MRSA/MSSA PCR +  - 4/3 urine culture negative, CT A/O - no infectious focus or colitis  - 4/5 Procal 6.6 increased from 0.08 on 4/3, lactate 4.3  - 4/7 fever, meropenem restarted - no leukocytosis - RVP neg, UA neg, dopplers neg  - 4/7 blood cultures positive for serratia  - continue with meropenem pending sensitivities  - ID recs appreciated

## 2024-04-09 NOTE — PROGRESS NOTE ADULT - NS ATTEND AMEND GEN_ALL_CORE FT
74 year old female with diabetes, hypertension, hyperlipidemia, hypothyroidism, RA on Prednisone, fibromyalgia, cerebral aneurysm s/p repair, chronic hypercapnic respiratory failure s/p trach (vent dependent) and PEG, recurrent C.diff infection (follows with Dr. Newman) who presented with dislodged PEG.     #neuro  awake, interactive    #resp  full vent support  monitor for hemoptysis - no katie hemoptysis at this time, continue duonebs     #Dislodged PEG  She is s/p bedside replacement by IR, however continues to have significant leakage, awaiting further input form IR, planned for revision     #ID  Pt febrile to 102 on 4/7. Repeat blood cultures drawn. Meropenem resumed. Monitor for further fevers, unclear source at this time   completed  fidaxomicin for total 10 day course, restart vanco po 125 mg qid as suppression as she is back on antibiotics.     #Sacral wound: Pending wound care evaluation 74 year old female with diabetes, hypertension, hyperlipidemia, hypothyroidism, RA on Prednisone, fibromyalgia, cerebral aneurysm s/p repair, chronic hypercapnic respiratory failure s/p trach (vent dependent) and PEG, recurrent C.diff infection (follows with Dr. Newman) who presented with dislodged PEG.     #neuro  awake, interactive, followed commands    #resp  full vent support  monitor for hemoptysis - no katie hemoptysis at this time, continue duonebs     #Dislodged PEG  She is s/p bedside replacement by IR, however continues to have significant leakage, awaiting further input form IR, planned for revision. GI consulted previously as well. Tube feeds at reduced rate at 20cc/hr     #ID  Pt febrile to 102 on 4/7. Repeat blood cultures drawn. Meropenem resumed. Monitor for further fevers, unclear source at this time   completed  fidaxomicin for total 10 day course, restart vanco po 125 mg qid as suppression as she is back on antibiotics.     #Sacral wound: Pending wound care evaluation.    #Heme   Hb 7, platelets 106, no bleeding, monitor, transfuse for Hb <6 or active bleeding

## 2024-04-09 NOTE — PROGRESS NOTE ADULT - PROBLEM SELECTOR PLAN 4
Chronic Hypoxemic/Hypercapnic Respiratory Failure  Chronic Trach/Vent dependant 2/2 Hypercapnic Resp Failure since 10/2022   - S/p Trach # 8 Distal XLT Shiley Cuffed   - Home Vent: volume /12/30%/+5  - Trach Care and suction PRN possibily reactive in setting of antibiotics/active infection  - history of AOCD  - ID aware - continue antibiotics  - continue to monitor CBC

## 2024-04-09 NOTE — CONSULT NOTE ADULT - ASSESSMENT
73 yo F PMH T2DM on insulin, HTN, HLD, hypothyroidism, RA on Prednisone, Fibromyalgia, cerebral aneurysm s/p repair, chronic hypercapnic respiratory failure s/p trach (vent dependent) and Chronic PEG 2022 , recurrent C.diff infection (follows with Dr. Newman) , bedbound who presented from home with complaints of possible G tube dislodgement and redness around the site.       1. PEG Malfunction   ongoing issue unresolved after multiple strategies attempted  IR deferring further upsizing   optimize patients electrolytes   need Hgb to be 7.5 or greater   Plan for a repeat endoscopically placed PEG on Thursday     2. C. Diff   continue abx coverage per ID recs     3. Sacral wound   per wound care      The plan of care was discussed with the physician assistant and modifications were made to the notation where appropriate.   Differential diagnosis and plan of care discussed with patient after the evaluation  35 minutes spent on total encounter of which more than fifty percent of the encounter was spent counseling and/or coordinating care by the attending physician.    [facility]  nAdrew Ivy PA-C  [address], [place]  Office: [phone]  Cell: [phone]  73 yo F PMH T2DM on insulin, HTN, HLD, hypothyroidism, RA on Prednisone, Fibromyalgia, cerebral aneurysm s/p repair, chronic hypercapnic respiratory failure s/p trach (vent dependent) and Chronic PEG 2022 , recurrent C.diff infection (follows with Dr. Newman) , bedbound who presented from home with complaints of possible G tube dislodgement and redness around the site.       1. PEG Malfunction   ongoing issue unresolved after multiple strategies attempted  IR deferring further upsizing   optimize patients electrolytes and the Hgb needs to be 7.5 or greater   Plan for an endoscopically placed PEG once no longer bacteremic    2. C. Diff   continue abx coverage per ID recs     3. Sacral wound   per wound care      The plan of care was discussed with the physician assistant and modifications were made to the notation where appropriate.   Differential diagnosis and plan of care discussed with patient after the evaluation  35 minutes spent on total encounter of which more than fifty percent of the encounter was spent counseling and/or coordinating care by the attending physician.    [facility]  Andrew Ivy PA-C  [address], [place]  Office: [phone]  Cell: [phone]

## 2024-04-09 NOTE — PROGRESS NOTE ADULT - SUBJECTIVE AND OBJECTIVE BOX
infectious diseases progress note:    Patient is a 74y old  Female who presents with a chief complaint of Dislodged G Tube (09 Apr 2024 07:59)        Cellulitis of abdominal wall               Allergies    pineapple (Unknown)  Tagamet (Unknown)  heparin (Unknown)  walnut (Unknown)  metronidazole (Rash)  penicillin (Unknown)  Lyrica (Unknown)  Pecan, Filbert, Hazelnut (Unknown)    Intolerances        ANTIBIOTICS/RELEVANT:  antimicrobials  meropenem  IVPB 1000 milliGRAM(s) IV Intermittent every 8 hours  meropenem  IVPB      vancomycin    Solution 125 milliGRAM(s) Oral every 6 hours    immunologic:    OTHER:  acetaminophen   Oral Liquid .. 1000 milliGRAM(s) Oral every 6 hours PRN  albuterol/ipratropium for Nebulization 3 milliLiter(s) Nebulizer every 6 hours  amLODIPine   Tablet 10 milliGRAM(s) Oral <User Schedule>  artificial tears (preservative free) Ophthalmic Solution 2 Drop(s) Both EYES every 6 hours  ascorbic acid 500 milliGRAM(s) Oral daily  Biotene Dry Mouth Oral Rinse 5 milliLiter(s) Swish and Spit every 6 hours  chlorhexidine 0.12% Liquid 15 milliLiter(s) Oral Mucosa every 12 hours  chlorhexidine 4% Liquid 1 Application(s) Topical <User Schedule>  escitalopram 10 milliGRAM(s) Oral <User Schedule>  gabapentin Solution 100 milliGRAM(s) Oral <User Schedule>  insulin glargine Injectable (LANTUS) 7 Unit(s) SubCutaneous <User Schedule>  insulin lispro (ADMELOG) corrective regimen sliding scale   SubCutaneous every 6 hours  lactated ringers. 1000 milliLiter(s) IV Continuous <Continuous>  levothyroxine Injectable 87.5 MICROGram(s) IV Push <User Schedule>  lidocaine   4% Patch 1 Patch Transdermal at bedtime  lidocaine   4% Patch 1 Patch Transdermal at bedtime  melatonin Liquid 3 milliGRAM(s) Oral at bedtime  multivitamin/minerals/iron Oral Solution (CENTRUM) 15 milliLiter(s) Oral daily  ondansetron Injectable 4 milliGRAM(s) IV Push every 8 hours PRN  pantoprazole  Injectable 40 milliGRAM(s) IV Push <User Schedule>  petrolatum Ophthalmic Ointment 1 Application(s) Both EYES every 6 hours PRN  predniSONE  Solution 5 milliGRAM(s) Oral <User Schedule>  QUEtiapine 12.5 milliGRAM(s) Oral <User Schedule>  simethicone 80 milliGRAM(s) Chew every 12 hours  sodium chloride 0.65% Nasal 1 Spray(s) Both Nostrils two times a day PRN  sodium chloride 0.65% Nasal 1 Spray(s) Both Nostrils every 12 hours PRN  tobramycin 0.3% Ophthalmic Solution 2 Drop(s) Both EYES every 6 hours  traMADol 25 milliGRAM(s) Oral every 8 hours PRN      Objective:  Vital Signs Last 24 Hrs  T(C): 37.2 (09 Apr 2024 06:00), Max: 37.2 (09 Apr 2024 06:00)  T(F): 98.9 (09 Apr 2024 06:00), Max: 98.9 (09 Apr 2024 06:00)  HR: 83 (09 Apr 2024 06:11) (75 - 87)  BP: 99/50 (09 Apr 2024 06:00) (99/50 - 139/65)  BP(mean): --  RR: 16 (09 Apr 2024 06:00) (16 - 22)  SpO2: 98% (09 Apr 2024 06:11) (97% - 100%)    Parameters below as of 09 Apr 2024 06:11  Patient On (Oxygen Delivery Method): ventilator        PHYSI ss  Eyes:NUBIA, EOMI  Ear/Nose/Throat: no oral lesion, no sinus tenderness on percussion	  Neck:no JVD, no lymphadenopathy, supple  Respiratory: CTA ruth  Cardiovascular: S1S2 RRR, no murmurs  Gastrointestinal:soft, (+) BS, no HSM  Extremities:no e/e/c        LABS:                        7.2    4.38  )-----------( 106      ( 09 Apr 2024 01:10 )             25.7     04-09    140  |  102  |  13  ----------------------------<  155<H>  4.0   |  26  |  0.17<L>    Ca    8.7      09 Apr 2024 01:10  Phos  2.7     04-09  Mg     2.0     04-09    TPro  6.4  /  Alb  2.9<L>  /  TBili  0.2  /  DBili  x   /  AST  25  /  ALT  20  /  AlkPhos  46  04-09      Urinalysis Basic - ( 09 Apr 2024 01:10 )    Color: x / Appearance: x / SG: x / pH: x  Gluc: 155 mg/dL / Ketone: x  / Bili: x / Urobili: x   Blood: x / Protein: x / Nitrite: x   Leuk Esterase: x / RBC: x / WBC x   Sq Epi: x / Non Sq Epi: x / Bacteria: x          MICROBIOLOGY:    RECENT CULTURES:  04-07 @ 14:31 .Sputum Sputum       Numerous polymorphonuclear leukocytes per low power field  Few Squamous epithelial cells per low power field  Numerous Gram Negative Rods per oil power field  Few Gram Positive Rods per oil power field           Mixed gram negative rods Culture includes Moderate Stenotrophomonas  maltophilia    04-07 @ 07:21 .Blood Blood-Peripheral                No growth at 24 hours    04-05 @ 06:43 .Sputum Sputum   LENIN    Few polymorphonuclear leukocytes per low power field  Few Squamous epithelial cells per low power field  Numerous Gram Negative Rods seen per oil power field  Moderate Gram Positive Rods seen per oil power field    Pseudomonas aeruginosa  Pseudomonas aeruginosa     Numerous Pseudomonas aeruginosa  Normal Respiratory Shanda present    04-04 @ 21:00 .Blood Blood-Peripheral                No growth at 4 days    04-03 @ 02:07 Clean Catch Clean Catch (Midstream)                <10,000 CFU/mL Normal Urogenital Shanda    04-02 @ 23:39 .Blood Blood-Peripheral                No growth at 5 days          RESPIRATORY CULTURES:              RADIOLOGY & ADDITIONAL STUDIES:        Pager [phone]  After 5 pm/weekends or if no response :[phone]

## 2024-04-09 NOTE — ADVANCED PRACTICE NURSE CONSULT - REASON FOR CONSULT
Bedside MIDLINE ordered for IV access
Vascular Access Team    Evaluation for: Bedside MIDLINE placement  Requested by name: Isaura Castro  Date/Time: 4/9/2024    Indication for MIDLINE: Iv abt Meropenem  Allergy to CHG or Heparin or Lidocaine: Yes to Heparin    Platelets(>20): 106  INR(<3): 1.36  eGFR(>40): 129  Pending blood cultures: N/A  Anticoagulants: None  Arms DVT:  Mastectomy: No  Fistula: No  IR or Nephrology or ID:  clearance needed: No    Consent obtained: N/A    Pending: None    Plan: Bedside midline order evaluated. Midline will be placed within 24hours. Please call [phone] with any questions.  
Wound care consult initiated by RN to assess patient's skin for a possible sacral stage 3 pressure injury present on admission     Reason for Admission: Dislodged G Tube  History of Present Illness:   73 yo F PMH T2DM on insulin, HTN, HLD, hypothyroidism, RA on Prednisone, Fibromyalgia, cerebral aneurysm s/p repair, acute hypercapnic respiratory failure s/p trach (vent dependent) and PEG (10/22), bedbound presented from home with complaints of possible G tube dislodgement and redness around the site.  Site is red with small amount of pus at insertion site.  Tender to palpation, warm to touch. CT Abdomen/Pelvis done showing dislodgement of G tube with balloon in the anterior abdominal wall with air filled track to stomach.  GI contacted, but unable to replace at bedside given placement of balloon. Surgery consulted--recommend gastrograffin study to eval placement of PEG and possible IR replacement in AM. Given Vancomycin 1 gm IV x 1 due to concern for possible cellulitis.  Of note, patient hemodynamically stable, afebrile, without leukocytosis on presentation.     Per patient's son at bedside, patient currently being treated for C-diff.  Known infection for past 2-3 weeks.  Treated by Dr. Zion Newman, currently receiving Fidaxomicin.  Also report recent UTI--not currently being treated.    Admitted to MICU for ventilator support and treatment of dislodged PEG/abdominal wall cellulitis.

## 2024-04-09 NOTE — PROGRESS NOTE ADULT - ASSESSMENT
73yo woman  h/o T2DM (4/4/24: A1C = 6.2%), HTN, HLD, anemia, hypothyroidism, RA, fibromyalgia, remote cerebral aneurysm repair, acute hypercapnic respiratory failure with tracheostomy, PEG tube (initially placed 2022), and bedbound, recurrent C diff presented for PEG tube dislodgment. Admitted 4/2/24  weight = 54.5 kg    Recurrent C diff - first episode Aug 2023 treated with tapering PO vanco, recurrent episode 1/31/24 treated with PO vanco and then fidaxomicin completed in Feb - most recently tested positive 3/20/24 seen by Dr. Newman 3/29 outpatient, started on fidaxomicin that day - tube feeds concurrently held, unclear if improving afterwards per family  Chronic sacral decubitus wound  3/20 Klebisella bacteruria R only to amp and sputum few Pseudomonas R to FQs w/o polys on gram stian - treatment of urine was deferred to avoid exacerbating C diff    Tmax 100, no leukocytosis  Concern for cellulitis around PEG tube site - area with very minimal erythema, remarkable receded from skin paula placed on admission  UA 11 WBC  CT a/p: no infectious focus or colitis  MRSA PCR+    4/3 IR - PEG exchanged bedside to 20 Fr Kangaroo EnFit  - Bedside XR demonstrates appropriately positioned G-tube with contrast filling into the stomach and bowel.    4/2 Blood Cultures x 2 NGTD;  Positive MRSA/MSSA PCR  4/3 Urine cultures NEG  4/4 fever  4/4 Wound care assessment appreciated:   " area of persistent nonblanchable dark discoloration that is inconsistent with a patient's surrounding skin tone as well as a white juan j film noted over B/L buttocks/sacral skin, area measures approximately 10cm x 10cm x 0cm- presentation is consistent with a deep tissue injury in evolution with incontinence and fungal involvement present on admission. Within the apex of the gluteal cleft/base of sacrum there is full thickness skin loss with red, beefy tissue noted, area measures approximately 3cm x 1cm x 0.3cm- presentation is consistent with a deep tissue injury in evolution with incontinence involvement present on admission."    Antibiotics  Meropenem 4/2 -->4/3  IV Vanco 4/2  Fdaxomicin 4/3-->    PEG site no obvious cellulitis - probably more irritation from displacement/leaked contents, no indication for antimicrobials.         Suggest:  completed  fidaxomicin for total 10 day course  on meropenem pending revision of PEG Day 4/7  would restart vanco 125 mg qid as suppression as she is back on antibiotics   - monitor bowel movements/output .    we do not need to treat the S. Multiphila in sputum at present

## 2024-04-09 NOTE — ADVANCED PRACTICE NURSE CONSULT - RECOMMEDATIONS
Impression:    fungal dermatitis  incontinence associated dermatitis  fecal incontinence   urinary incontinence  B/L buttocks/sacral deep tissue injury in evolution, present on admission    Recommendations:     1) continue turning and positioning q2 and PRN utilizing offloading assistive devices  2) continue with routine pericare daily and PRN soiling  3) encourage optimal nutrition  4) waffle cushion when oob to chair  5) B/L LE complete cair air fluidized boots to offload heels/feet  6) hortensia antifungal protective barrier cream to B/L buttocks/sacrum daily and PRN soiling  7) incontinence management - consider external urinary catheter to divert urine from skin if incontinent    Plan discussed with HANNAH Ma on unit    For questions/comments regarding the recommendations in this consult, please contact Casie at [phone]. Thank you!  
Post procedure verbal instructions given to the patient or patient representative. Patient or patient representative  instructed regarding signs and symptoms of infection. Patient instructed to keep dressing dry and clean. Care for catheter as per hospital protocols.

## 2024-04-09 NOTE — ADVANCED PRACTICE NURSE CONSULT - ASSESSMENT
Midline Catheter Insertion Note    Catheter type: 4F  : Bard  Power Injectable: Yes  Ref#: [id number]  Procedure assisted by: Ga Chase RN    Time out was preformed, confirming the patient's first and last name, date of birth, procedure, and correct site prior to state of procedure.  Patient was placed with HOB 30 degrees. Patient placement site was prepped with chlorhexidine solution, then draped using maximum sterile barrier protection. Using the Bard Site Rite 8, the catheter was placed using the Modified Seldinger Technique. The area was injected with 2 ml of 1% lidocaine. Using the ultrasound, the catheter was introduced. Strict adherence to outline aseptic technique including handwashing, glove and gown, utilizing mask and cap, plus draping the patient with a sterile drape was observed. Upon completion of line placement, the insertion site was covered with a sterile occlusive CHG dressing. Pt tolerated procedure well.   All materials used for catheter insertion, including the intact guide wires, were accounted for at the end of the procedure.    Number of attempts: 1  Complications/Comments: None  Emergency Placement: No    Site: New site  Anatomical Site of insertion: R Brachial   Catheter size/length: 4Fr., 20 cm.  US guided Bard SL Power MIDLINE placed  
Patient encountered on RCU. When wound care RN arrived on unit, patient was found lying in a low air loss pressure redistribution support surface style bed with  at bedside. Patient does not speak with WOC at bedside despite attempts. Ms Woods is unable to turn independently and staff assistance x 1 was provided with the assistance of her . Once turned, wound care RN was able to visualize an area of persistent nonblanchable dark discoloration that is inconsistent with a patient's surrounding skin tone as well as a white juan j film noted over B/L buttocks/sacral skin, area measures approximately 10cm x 10cm x 0cm- presentation is consistent with a deep tissue injury in evolution with incontinence and fungal involvement present on admission. Within the apex of the gluteal cleft/base of sacrum there is full thickness skin loss with red, beefy tissue noted, area measures approximately 3cm x 1cm x 0.3cm- presentation is consistent with a deep tissue injury in evolution with incontinence involvement present on admission. Once consult was complete, patient and family were educated regarding the need for routine turning and positioning to prevent pressure injuries and patient was placed in a right side-lying position utilizing pillow positioner assistive devices.

## 2024-04-09 NOTE — PROGRESS NOTE ADULT - PROBLEM SELECTOR PLAN 5
Sacral wound present on admission   - wound care recs appreciated Chronic Hypoxemic/Hypercapnic Respiratory Failure  Chronic Trach/Vent dependant 2/2 Hypercapnic Resp Failure since 10/2022   - S/p Trach # 8 Distal XLT Shiley Cuffed   - Home Vent: volume /12/30%/+5  - Trach Care and suction PRN

## 2024-04-09 NOTE — PROGRESS NOTE ADULT - PROBLEM SELECTOR PLAN 2
Being treated for C diff as outpatient with follow up with Infectious disease, Dr Newman   - Will resume on Home fidaxomicin for 5 more days (total 10 day course) 4/3 -->4/13  - ID recommends Zinvaya 50mg IV to be given to reduce Cdiff infections  - Infectious diease consult appreciated Being treated for C diff as outpatient with follow up with Infectious disease, Dr Newman   - completed fidaxomicin (total 10 day course) 4/3 -->4/13  - ID recommends Zinvaya 50mg IV to be given to reduce Cdiff infections (is not carried in hospital)  - continue with PO vanco (started 4/8) until completion of meropenem  - Infectious diease consult appreciated

## 2024-04-09 NOTE — PROGRESS NOTE ADULT - ASSESSMENT
75 yo F PMH T2DM on insulin, HTN, HLD, hypothyroidism, RA on Prednisone, Fibromyalgia, cerebral aneurysm s/p repair, chronic hypercapnic respiratory failure s/p trach (vent dependent) and Chronic PEG 2022 , recurrent C.diff infection (follows with Dr. Newman) , bedbound who presented from home with complaints of possible G tube dislodgement and redness around the site. Had CT Abdomen/Pelvis done showing dislodgement of G tube with balloon in the anterior abdominal wall with air filled track to stomach. Admitted to MICU for ventilator management and given vancomycin/meropenem empirically for treatment of cellulitis. Per family patient has had Known c diff infection for past 2-3 weeks.  Treating with Fidaxomicin.  IR team exchanged PEG on 4/3 however later in the day it remained with leakage.  IR Attending adjusted PEG at bedside on 4/4 and PEG remained with moderate amount of PEG leakage once feeds initiated at reduced rate.       4/8:  PEG still with drainage, on IR schedule for tomorrow - will make NPO after midnight - IR to come and discuss plan/consent in AM of 4/9.  Afebrile - continuing meropenem - fidaxomicin completed x10 days, restarted vanco PO as per ID reccs.  Infectious w/u negative thus far, patient non toxic appearing - if patient respikes will plan for CT C/A/P.  Dental consult placed to rule out abscess as infectious source.  Daughter Marysol was at bedside for AM rounds and involved in plan of care.  73 yo F PMH T2DM on insulin, HTN, HLD, hypothyroidism, RA on Prednisone, Fibromyalgia, cerebral aneurysm s/p repair, chronic hypercapnic respiratory failure s/p trach (vent dependent) and Chronic PEG 2022 , recurrent C.diff infection (follows with Dr. Newman) , bedbound who presented from home with complaints of possible G tube dislodgement and redness around the site. Had CT Abdomen/Pelvis done showing dislodgement of G tube with balloon in the anterior abdominal wall with air filled track to stomach. Admitted to MICU for ventilator management and given vancomycin/meropenem empirically for treatment of cellulitis. Per family patient has had Known c diff infection for past 2-3 weeks.  Treating with Fidaxomicin.  IR team exchanged PEG on 4/3 however later in the day it remained with leakage.  IR Attending adjusted PEG at bedside on 4/4 and PEG remained with moderate amount of PEG leakage once feeds initiated at reduced rate.       4/9:  IR was called yesterday for an explanation/plan for procedure (for our team and for pts daughter) - we were informed someone would be bedside later in evening rounds.  This am IR called again to discuss plan for procedure scheduled for today 4/9.  Pt was still on schedule this AM, was informed that someone from IR team will come to bedside to evaluate/discuss plan/consent with daughter prior to procedure.  Daughter frustrated, case discussed between daughter/IR PA.  IR PA Lesley discussed plan with her team, case was cancelled for today, GI brought on board as per pts daughters request for second opinion.  GI - Dr. Cleaning now involved in case, plan for possible new PEG with GI.  Pt now with positive blood cultures - serratia, awaiting sensitivities - will need to hold off on new PEG until bacteremia clears.  PEG then fell out and was found in bed, Dr. Cleaning called, idania placed in track, Dr. Cleaning to replace PEG today.  Possible temporary NGT but needs placement with xrays to confirm no extravasation at PEG site.  ID - Dr. Javed, continue meropenem until sensitivities result, aware daughter is concerned about the thrombocytopenia which she contributes to antibiotics - continue current therapy as per Dr. Javed.  Addressed Zinvaya with Dr. Javed (initially spoken about between Dr. Newman and Daughter), spoke with pharmacy, hospital does not carry that medication.  Daughter Marysol aware.   73 yo F PMH T2DM on insulin, HTN, HLD, hypothyroidism, RA on Prednisone, Fibromyalgia, cerebral aneurysm s/p repair, chronic hypercapnic respiratory failure s/p trach (vent dependent) and Chronic PEG 2022 , recurrent C.diff infection (follows with Dr. Newman) , bedbound who presented from home with complaints of possible G tube dislodgement and redness around the site. Had CT Abdomen/Pelvis done showing dislodgement of G tube with balloon in the anterior abdominal wall with air filled track to stomach. Admitted to MICU for ventilator management and given vancomycin/meropenem empirically for treatment of cellulitis. Per family patient has had Known c diff infection for past 2-3 weeks.  Treating with Fidaxomicin.  IR team exchanged PEG on 4/3 however later in the day it remained with leakage.  IR Attending adjusted PEG at bedside on 4/4 and PEG remained with moderate amount of PEG leakage once feeds initiated at reduced rate.       4/9:  IR was called yesterday for an explanation/plan for procedure (for our team and for pts daughter) - we were informed someone would be bedside later in evening rounds.  This am IR called again to discuss plan for procedure scheduled for today 4/9.  Pt was still on schedule this AM, was informed that someone from IR team will come to bedside to evaluate/discuss plan/consent with daughter prior to procedure.  Daughter frustrated, case discussed between daughter/IR PA.  IR PA Lesley discussed plan with her team, case was cancelled for today, GI brought on board as per pts daughters request for second opinion.  GI - Dr. Cleaning now involved in case, plan for possible new PEG with GI.  Pt now with positive blood cultures - serratia, awaiting sensitivities - will need to hold off on new PEG until bacteremia clears.  PEG then fell out and was found in bed, Dr. Cleaning called, idania placed in track, Dr. Cleaning to replace PEG today.  Possible temporary NGT but needs placement with xrays to confirm no extravasation at PEG site.  ID - Dr. Javed, continue meropenem until sensitivities result, aware daughter is concerned about the thrombocytopenia which she contributes to antibiotics - continue current therapy as per Dr. Javed.  Addressed Zinvaya with Dr. Javed (initially spoken about between Dr. Newman and Daughter), spoke with pharmacy, hospital does not carry that medication.  Continue vanco PO until meropenem course completed - ok for missed doses because of PEG complications.  Daughter Marysol aware.   73 yo F PMH T2DM on insulin, HTN, HLD, hypothyroidism, RA on Prednisone, Fibromyalgia, cerebral aneurysm s/p repair, chronic hypercapnic respiratory failure s/p trach (vent dependent) and Chronic PEG 2022 , recurrent C.diff infection (follows with Dr. Newman) , bedbound who presented from home with complaints of possible G tube dislodgement and redness around the site. Had CT Abdomen/Pelvis done showing dislodgement of G tube with balloon in the anterior abdominal wall with air filled track to stomach. Admitted to MICU for ventilator management and given vancomycin/meropenem empirically for treatment of cellulitis. Per family patient has had Known c diff infection for past 2-3 weeks.  Treating with Fidaxomicin.  IR team exchanged PEG on 4/3 however later in the day it remained with leakage.  IR Attending adjusted PEG at bedside on 4/4 and PEG remained with moderate amount of PEG leakage once feeds initiated at reduced rate.       4/9:  IR was called yesterday for an explanation/plan for procedure (for our team and for pts daughter) - we were informed someone would be bedside later in evening rounds.  This am IR called again to discuss plan for procedure scheduled for today 4/9.  Pt was still on schedule this AM, was informed that someone from IR team will come to bedside to evaluate/discuss plan/consent with daughter prior to procedure.  Daughter frustrated, case discussed between daughter/IR PA.  IR PA Lesley discussed plan with her team, case was cancelled for today, GI brought on board as per pts daughters request for second opinion.  GI - Dr. Cleaning now involved in case, plan for possible new PEG with GI.  Pt now with positive blood cultures - serratia, awaiting sensitivities - will need to hold off on new PEG until bacteremia clears.  PEG then fell out and was found in bed, Dr. Cleaning called, idania placed in track, Dr. Cleaning to replace PEG today.  Possible temporary NGT but needs placement with xrays to confirm no extravasation at PEG site.  ID - Dr. Javed, continue meropenem until sensitivities result, aware daughter is concerned about the thrombocytopenia which she contributes to antibiotics - continue current therapy as per Dr. Javed.  Addressed Zinvaya with Dr. Javed (initially spoken about between Dr. Newman and Daughter), spoke with pharmacy, hospital does not carry that medication.  Continue vanco PO until meropenem course completed - ok for missed doses because of PEG complications.  Midline placed.  Daughter Marysol aware.   73 yo F PMH T2DM on insulin, HTN, HLD, hypothyroidism, RA on Prednisone, Fibromyalgia, cerebral aneurysm s/p repair, chronic hypercapnic respiratory failure s/p trach (vent dependent) and Chronic PEG 2022 , recurrent C.diff infection (follows with Dr. Newman) , bedbound who presented from home with complaints of possible G tube dislodgement and redness around the site. Had CT Abdomen/Pelvis done showing dislodgement of G tube with balloon in the anterior abdominal wall with air filled track to stomach. Admitted to MICU for ventilator management and given vancomycin/meropenem empirically for treatment of cellulitis. Per family patient has had Known c diff infection for past 2-3 weeks.  Treating with Fidaxomicin.  IR team exchanged PEG on 4/3 however later in the day it remained with leakage.  IR Attending adjusted PEG at bedside on 4/4 and PEG remained with moderate amount of PEG leakage once feeds initiated at reduced rate.       4/9:  IR was called yesterday for an explanation/plan for procedure (for our team and for pts daughter) - we were informed someone would be bedside later in evening rounds.  This am IR called again to discuss plan for procedure scheduled for today 4/9.  Pt was still on schedule this AM, was informed that someone from IR team will come to bedside to evaluate/discuss plan/consent with daughter prior to procedure.  Daughter frustrated, case discussed between daughter/IR PA.  IR PA Lesley discussed plan with her team, case was cancelled for today, GI brought on board as per pts daughters request for second opinion.  GI - Dr. Cleaning now involved in case, plan for possible new PEG with GI.  Pt now with positive blood cultures - serratia, awaiting sensitivities - will need to hold off on new PEG until bacteremia clears.  PEG then fell out and was found in bed, Dr. Cleaning called, emerson placed in track, Dr. Cleaning to replace PEG today.  Possible temporary NGT but needs placement with xrays to confirm no extravasation at PEG site.  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  ID - Dr. Javed, continue meropenem until sensitivities result, aware daughter is concerned about the thrombocytopenia which she contributes to antibiotics - continue current therapy as per Dr. Javed.  Addressed Zinvaya with Dr. Javed (initially spoken about between Dr. Newman and Daughter), spoke with pharmacy, hospital does not carry that medication.  Continue vanco PO until meropenem course completed - ok for missed doses because of PEG complications.  Midline placed.  Daughter Marysol aware.

## 2024-04-09 NOTE — PROGRESS NOTE ADULT - SUBJECTIVE AND OBJECTIVE BOX
Patient is a 74y old  Female who presents with a chief complaint of Dislodged G Tube (08 Apr 2024 08:32)      Interval Events:    REVIEW OF SYSTEMS:  [ ] Positive  [ ] All other systems negative  [ ] Unable to assess ROS because ________    Vital Signs Last 24 Hrs  T(C): 37.2 (04-09-24 @ 06:00), Max: 37.2 (04-09-24 @ 06:00)  T(F): 98.9 (04-09-24 @ 06:00), Max: 98.9 (04-09-24 @ 06:00)  HR: 83 (04-09-24 @ 06:11) (75 - 87)  BP: 99/50 (04-09-24 @ 06:00) (99/50 - 139/65)  RR: 16 (04-09-24 @ 06:00) (16 - 22)  SpO2: 98% (04-09-24 @ 06:11) (97% - 100%)    PHYSICAL EXAM:  HEENT:   [ ]Tracheostomy:  [ ]Pupils equal  [ ]No oral lesions  [ ]Abnormal    SKIN  [ ]No Rash  [ ] Abnormal  [ ] pressure    CARDIAC  [ ]Regular  [ ]Abnormal    PULMONARY  [ ]Bilateral Clear Breath Sounds  [ ]Normal Excursion  [ ]Abnormal    GI  [ ]PEG      [ ] +BS		              [ ]Soft, nondistended, nontender	  [ ]Abnormal    MUSCULOSKELETAL                                   [ ]Bedbound                 [ ]Abnormal    [ ]Ambulatory/OOB to chair                           EXTREMITIES                                         [ ]Normal  [ ]Edema                           NEUROLOGIC  [ ] Normal, non focal  [ ] Focal findings:    PSYCHIATRIC  [ ]Alert and appropriate  [ ] Sedated	 [ ]Agitated    :  Mcbride: [ ] Yes, if yes: Date of Placement:                   [  ] No    LINES: Central Lines [ ] Yes, if yes: Date of Placement                                     [  ] No    HOSPITAL MEDICATIONS:  MEDICATIONS  (STANDING):  albuterol/ipratropium for Nebulization 3 milliLiter(s) Nebulizer every 6 hours  amLODIPine   Tablet 10 milliGRAM(s) Oral <User Schedule>  artificial tears (preservative free) Ophthalmic Solution 2 Drop(s) Both EYES every 6 hours  ascorbic acid 500 milliGRAM(s) Oral daily  Biotene Dry Mouth Oral Rinse 5 milliLiter(s) Swish and Spit every 6 hours  chlorhexidine 0.12% Liquid 15 milliLiter(s) Oral Mucosa every 12 hours  chlorhexidine 4% Liquid 1 Application(s) Topical <User Schedule>  escitalopram 10 milliGRAM(s) Oral <User Schedule>  gabapentin Solution 100 milliGRAM(s) Oral <User Schedule>  insulin glargine Injectable (LANTUS) 7 Unit(s) SubCutaneous <User Schedule>  insulin lispro (ADMELOG) corrective regimen sliding scale   SubCutaneous every 6 hours  lactated ringers. 1000 milliLiter(s) (50 mL/Hr) IV Continuous <Continuous>  levothyroxine Injectable 87.5 MICROGram(s) IV Push <User Schedule>  lidocaine   4% Patch 1 Patch Transdermal at bedtime  lidocaine   4% Patch 1 Patch Transdermal at bedtime  melatonin Liquid 3 milliGRAM(s) Oral at bedtime  meropenem  IVPB 1000 milliGRAM(s) IV Intermittent every 8 hours  meropenem  IVPB      multivitamin/minerals/iron Oral Solution (CENTRUM) 15 milliLiter(s) Oral daily  pantoprazole  Injectable 40 milliGRAM(s) IV Push <User Schedule>  predniSONE  Solution 5 milliGRAM(s) Oral <User Schedule>  QUEtiapine 12.5 milliGRAM(s) Oral <User Schedule>  simethicone 80 milliGRAM(s) Chew every 12 hours  tobramycin 0.3% Ophthalmic Solution 2 Drop(s) Both EYES every 6 hours  vancomycin    Solution 125 milliGRAM(s) Oral every 6 hours    MEDICATIONS  (PRN):  acetaminophen   Oral Liquid .. 1000 milliGRAM(s) Oral every 6 hours PRN Temp greater or equal to 38C (100.4F), Mild Pain (1 - 3)  ondansetron Injectable 4 milliGRAM(s) IV Push every 8 hours PRN Nausea and/or Vomiting  petrolatum Ophthalmic Ointment 1 Application(s) Both EYES every 6 hours PRN eye dryness  sodium chloride 0.65% Nasal 1 Spray(s) Both Nostrils two times a day PRN Nasal Congestion  sodium chloride 0.65% Nasal 1 Spray(s) Both Nostrils every 12 hours PRN Congestion  traMADol 25 milliGRAM(s) Oral every 8 hours PRN Mild Pain (1 - 3)      LABS:                        7.2    4.38  )-----------( 106      ( 09 Apr 2024 01:10 )             25.7     04-09    140  |  102  |  13  ----------------------------<  155<H>  4.0   |  26  |  0.17<L>    Ca    8.7      09 Apr 2024 01:10  Phos  2.7     04-09  Mg     2.0     04-09    TPro  6.4  /  Alb  2.9<L>  /  TBili  0.2  /  DBili  x   /  AST  25  /  ALT  20  /  AlkPhos  46  04-09      Urinalysis Basic - ( 09 Apr 2024 01:10 )    Color: x / Appearance: x / SG: x / pH: x  Gluc: 155 mg/dL / Ketone: x  / Bili: x / Urobili: x   Blood: x / Protein: x / Nitrite: x   Leuk Esterase: x / RBC: x / WBC x   Sq Epi: x / Non Sq Epi: x / Bacteria: x          CAPILLARY BLOOD GLUCOSE    MICROBIOLOGY:     RADIOLOGY:  [ ] Reviewed and interpreted by me    Mode: AC/ CMV (Assist Control/ Continuous Mandatory Ventilation)  RR (machine): 12  TV (machine): 300  FiO2: 30  PEEP: 5  ITime: 1  MAP: 8  PIP: 32   Patient is a 74y old  Female who presents with a chief complaint of Dislodged G Tube (08 Apr 2024 08:32)      Interval Events:  No acute events overnight.    REVIEW OF SYSTEMS:  [ ] Positive  [X] All other systems negative  [ ] Unable to assess ROS because ________    Vital Signs Last 24 Hrs  T(C): 37.2 (04-09-24 @ 06:00), Max: 37.2 (04-09-24 @ 06:00)  T(F): 98.9 (04-09-24 @ 06:00), Max: 98.9 (04-09-24 @ 06:00)  HR: 83 (04-09-24 @ 06:11) (75 - 87)  BP: 99/50 (04-09-24 @ 06:00) (99/50 - 139/65)  RR: 16 (04-09-24 @ 06:00) (16 - 22)  SpO2: 98% (04-09-24 @ 06:11) (97% - 100%)    PHYSICAL EXAM:  HEENT:   [X]Tracheostomy: #8 distal XLT cuffed shiley   [X]Pupils equal  [ ]No oral lesions  [ ]Abnormal    SKIN  [ ]No Rash  [ ] Abnormal  [X] pressure - sacral pressure injury    CARDIAC  [X]Regular  [ ]Abnormal    PULMONARY  [ ]Bilateral Clear Breath Sounds  [ ]Normal Excursion  [X]Abnormal - BL Coarse BS    GI  [X]PEG      [X] +BS		              [X]Soft, slightly distended, nontender	  [X]Abnormal - + PEG w/ leakage, no redness at site; + rectal tube     MUSCULOSKELETAL                                   [X]Bedbound                 [ ]Abnormal    [ ]Ambulatory/OOB to chair                           EXTREMITIES                                         [X]Normal  [ ]Edema                           NEUROLOGIC  [ ] Normal, non focal  [X] Focal findings: much more awake and alert, attempts to communicate over the vent, followed commands on all 4 extremities     PSYCHIATRIC  [X]Alert and appropriate  [ ] Sedated	 [ ]Agitated    :  Mcbride: [ ] Yes, if yes: Date of Placement:                   [X] No    LINES: Central Lines [ ] Yes, if yes: Date of Placement                                     [X] No    HOSPITAL MEDICATIONS:  MEDICATIONS  (STANDING):  albuterol/ipratropium for Nebulization 3 milliLiter(s) Nebulizer every 6 hours  amLODIPine   Tablet 10 milliGRAM(s) Oral <User Schedule>  artificial tears (preservative free) Ophthalmic Solution 2 Drop(s) Both EYES every 6 hours  ascorbic acid 500 milliGRAM(s) Oral daily  Biotene Dry Mouth Oral Rinse 5 milliLiter(s) Swish and Spit every 6 hours  chlorhexidine 0.12% Liquid 15 milliLiter(s) Oral Mucosa every 12 hours  chlorhexidine 4% Liquid 1 Application(s) Topical <User Schedule>  escitalopram 10 milliGRAM(s) Oral <User Schedule>  gabapentin Solution 100 milliGRAM(s) Oral <User Schedule>  insulin glargine Injectable (LANTUS) 7 Unit(s) SubCutaneous <User Schedule>  insulin lispro (ADMELOG) corrective regimen sliding scale   SubCutaneous every 6 hours  lactated ringers. 1000 milliLiter(s) (50 mL/Hr) IV Continuous <Continuous>  levothyroxine Injectable 87.5 MICROGram(s) IV Push <User Schedule>  lidocaine   4% Patch 1 Patch Transdermal at bedtime  lidocaine   4% Patch 1 Patch Transdermal at bedtime  melatonin Liquid 3 milliGRAM(s) Oral at bedtime  meropenem  IVPB 1000 milliGRAM(s) IV Intermittent every 8 hours  meropenem  IVPB      multivitamin/minerals/iron Oral Solution (CENTRUM) 15 milliLiter(s) Oral daily  pantoprazole  Injectable 40 milliGRAM(s) IV Push <User Schedule>  predniSONE  Solution 5 milliGRAM(s) Oral <User Schedule>  QUEtiapine 12.5 milliGRAM(s) Oral <User Schedule>  simethicone 80 milliGRAM(s) Chew every 12 hours  tobramycin 0.3% Ophthalmic Solution 2 Drop(s) Both EYES every 6 hours  vancomycin    Solution 125 milliGRAM(s) Oral every 6 hours    MEDICATIONS  (PRN):  acetaminophen   Oral Liquid .. 1000 milliGRAM(s) Oral every 6 hours PRN Temp greater or equal to 38C (100.4F), Mild Pain (1 - 3)  ondansetron Injectable 4 milliGRAM(s) IV Push every 8 hours PRN Nausea and/or Vomiting  petrolatum Ophthalmic Ointment 1 Application(s) Both EYES every 6 hours PRN eye dryness  sodium chloride 0.65% Nasal 1 Spray(s) Both Nostrils two times a day PRN Nasal Congestion  sodium chloride 0.65% Nasal 1 Spray(s) Both Nostrils every 12 hours PRN Congestion  traMADol 25 milliGRAM(s) Oral every 8 hours PRN Mild Pain (1 - 3)    LABS:                        7.2    4.38  )-----------( 106      ( 09 Apr 2024 01:10 )             25.7     04-09    140  |  102  |  13  ----------------------------<  155<H>  4.0   |  26  |  0.17<L>    Ca    8.7      09 Apr 2024 01:10  Phos  2.7     04-09  Mg     2.0     04-09    TPro  6.4  /  Alb  2.9<L>  /  TBili  0.2  /  DBili  x   /  AST  25  /  ALT  20  /  AlkPhos  46  04-09    Urinalysis Basic - ( 09 Apr 2024 01:10 )    Color: x / Appearance: x / SG: x / pH: x  Gluc: 155 mg/dL / Ketone: x  / Bili: x / Urobili: x   Blood: x / Protein: x / Nitrite: x   Leuk Esterase: x / RBC: x / WBC x   Sq Epi: x / Non Sq Epi: x / Bacteria: x    CAPILLARY BLOOD GLUCOSE    MICROBIOLOGY:     RADIOLOGY:  [ ] Reviewed and interpreted by me    Mode: AC/ CMV (Assist Control/ Continuous Mandatory Ventilation)  RR (machine): 12  TV (machine): 300  FiO2: 30  PEEP: 5  ITime: 1  MAP: 8  PIP: 32

## 2024-04-09 NOTE — PROGRESS NOTE ADULT - PROBLEM SELECTOR PLAN 1
PEG tube dislodgement on admission   - S/p CT abd/pelvis showing dislodgement of G tube with balloon in the anterior abdominal wall with air filled track to stomach.  - initially GI and Surgery consulted   - S/p bedside exchange by IR on 4/3- # 20 Fr Kangaroo EnFit placed   - 4/4: PEG leaking.  Feeds held.  IR adjusted PEG at bedside. G-tube study pending to exclude extravasation. Patient remains NPO except for meds, on IVF, LR at 50ml/hr.  - 4/5: PEG still leaking, adjusted at bedisde by IR Attending.  - 4/6: PEG still leaking, IR informed, planned for PEG revision on 4/8 or 4/9.  PEG feeds ongoing at reduced rate as tolerated. PEG tube dislodgement on admission   - S/p CT abd/pelvis showing dislodgement of G tube with balloon in the anterior abdominal wall with air filled track to stomach  - initially GI and Surgery consulted   - S/p bedside exchange by IR on 4/3- # 20 Fr Kangaroo EnFit placed   - 4/4: PEG leaking.  Feeds held.  IR adjusted PEG at bedside. G-tube study pending to exclude extravasation. Patient remains NPO except for meds, on IVF, LR at 50ml/hr.  - 4/5: PEG still leaking, adjusted at bedside by IR Attending.  - 4/6: PEG still leaking, IR informed, planned for PEG revision on 4/8 or 4/9  - 4/8-4/9 IR has aborted procedure - GI Dr. Cleaning has been brought onto case for new PEG  - 4/9 PEG fell out spontaneously, Dr. Cleaning to replace today - plan is for eventual new PEG when cultures clear, ?possible temporary NGT upon removal of PEG to allow for closure of PEG site

## 2024-04-10 LAB
-  AZTREONAM: SIGNIFICANT CHANGE UP
-  CEFEPIME: SIGNIFICANT CHANGE UP
-  CEFTAZIDIME/AVIBACTAM: SIGNIFICANT CHANGE UP
-  CEFTAZIDIME: SIGNIFICANT CHANGE UP
-  CEFTOLOZANE/TAZOBACTAM: SIGNIFICANT CHANGE UP
-  CIPROFLOXACIN: SIGNIFICANT CHANGE UP
-  IMIPENEM: SIGNIFICANT CHANGE UP
-  LEVOFLOXACIN: SIGNIFICANT CHANGE UP
-  MEROPENEM: SIGNIFICANT CHANGE UP
-  PIPERACILLIN/TAZOBACTAM: SIGNIFICANT CHANGE UP
ALBUMIN SERPL ELPH-MCNC: 3.2 G/DL — LOW (ref 3.3–5)
ALP SERPL-CCNC: 51 U/L — SIGNIFICANT CHANGE UP (ref 40–120)
ALT FLD-CCNC: 19 U/L — SIGNIFICANT CHANGE UP (ref 10–45)
ANION GAP SERPL CALC-SCNC: 15 MMOL/L — SIGNIFICANT CHANGE UP (ref 5–17)
AST SERPL-CCNC: 22 U/L — SIGNIFICANT CHANGE UP (ref 10–40)
BASOPHILS # BLD AUTO: 0 K/UL — SIGNIFICANT CHANGE UP (ref 0–0.2)
BASOPHILS NFR BLD AUTO: 0 % — SIGNIFICANT CHANGE UP (ref 0–2)
BILIRUB SERPL-MCNC: 0.6 MG/DL — SIGNIFICANT CHANGE UP (ref 0.2–1.2)
BLANDM BLD POS QL PROBE: SIGNIFICANT CHANGE UP
BUN SERPL-MCNC: <4 MG/DL — LOW (ref 7–23)
CALCIUM SERPL-MCNC: 8.7 MG/DL — SIGNIFICANT CHANGE UP (ref 8.4–10.5)
CHLORIDE SERPL-SCNC: 97 MMOL/L — SIGNIFICANT CHANGE UP (ref 96–108)
CO2 SERPL-SCNC: 26 MMOL/L — SIGNIFICANT CHANGE UP (ref 22–31)
CREAT SERPL-MCNC: <0.3 MG/DL — LOW (ref 0.5–1.3)
CULTURE RESULTS: ABNORMAL
CULTURE RESULTS: SIGNIFICANT CHANGE UP
EGFR: 113 ML/MIN/1.73M2 — SIGNIFICANT CHANGE UP
EOSINOPHIL # BLD AUTO: 0.06 K/UL — SIGNIFICANT CHANGE UP (ref 0–0.5)
EOSINOPHIL NFR BLD AUTO: 1 % — SIGNIFICANT CHANGE UP (ref 0–6)
GLUCOSE BLDC GLUCOMTR-MCNC: 144 MG/DL — HIGH (ref 70–99)
GLUCOSE BLDC GLUCOMTR-MCNC: 149 MG/DL — HIGH (ref 70–99)
GLUCOSE BLDC GLUCOMTR-MCNC: 152 MG/DL — HIGH (ref 70–99)
GLUCOSE BLDC GLUCOMTR-MCNC: 154 MG/DL — HIGH (ref 70–99)
GLUCOSE BLDC GLUCOMTR-MCNC: 177 MG/DL — HIGH (ref 70–99)
GLUCOSE SERPL-MCNC: 175 MG/DL — HIGH (ref 70–99)
HCT VFR BLD CALC: 29.8 % — LOW (ref 34.5–45)
HGB BLD-MCNC: 8.4 G/DL — LOW (ref 11.5–15.5)
LYMPHOCYTES # BLD AUTO: 1.4 K/UL — SIGNIFICANT CHANGE UP (ref 1–3.3)
LYMPHOCYTES # BLD AUTO: 23 % — SIGNIFICANT CHANGE UP (ref 13–44)
MAGNESIUM SERPL-MCNC: 1.6 MG/DL — SIGNIFICANT CHANGE UP (ref 1.6–2.6)
MCHC RBC-ENTMCNC: 24.1 PG — LOW (ref 27–34)
MCHC RBC-ENTMCNC: 28.2 GM/DL — LOW (ref 32–36)
MCV RBC AUTO: 85.4 FL — SIGNIFICANT CHANGE UP (ref 80–100)
METHOD TYPE: SIGNIFICANT CHANGE UP
METHOD TYPE: SIGNIFICANT CHANGE UP
MONOCYTES # BLD AUTO: 0.24 K/UL — SIGNIFICANT CHANGE UP (ref 0–0.9)
MONOCYTES NFR BLD AUTO: 4 % — SIGNIFICANT CHANGE UP (ref 2–14)
NEUTROPHILS # BLD AUTO: 4.32 K/UL — SIGNIFICANT CHANGE UP (ref 1.8–7.4)
NEUTROPHILS NFR BLD AUTO: 69 % — SIGNIFICANT CHANGE UP (ref 43–77)
ORGANISM # SPEC MICROSCOPIC CNT: ABNORMAL
PHOSPHATE SERPL-MCNC: 2.7 MG/DL — SIGNIFICANT CHANGE UP (ref 2.5–4.5)
PLATELET # BLD AUTO: 130 K/UL — LOW (ref 150–400)
POTASSIUM SERPL-MCNC: 2.8 MMOL/L — CRITICAL LOW (ref 3.5–5.3)
POTASSIUM SERPL-SCNC: 2.8 MMOL/L — CRITICAL LOW (ref 3.5–5.3)
PROT SERPL-MCNC: 6.9 G/DL — SIGNIFICANT CHANGE UP (ref 6–8.3)
RBC # BLD: 3.49 M/UL — LOW (ref 3.8–5.2)
RBC # FLD: 19.2 % — HIGH (ref 10.3–14.5)
SODIUM SERPL-SCNC: 138 MMOL/L — SIGNIFICANT CHANGE UP (ref 135–145)
SPECIMEN SOURCE: SIGNIFICANT CHANGE UP
SPECIMEN SOURCE: SIGNIFICANT CHANGE UP
WBC # BLD: 6.08 K/UL — SIGNIFICANT CHANGE UP (ref 3.8–10.5)
WBC # FLD AUTO: 6.08 K/UL — SIGNIFICANT CHANGE UP (ref 3.8–10.5)

## 2024-04-10 PROCEDURE — 99233 SBSQ HOSP IP/OBS HIGH 50: CPT | Mod: FS

## 2024-04-10 PROCEDURE — 99233 SBSQ HOSP IP/OBS HIGH 50: CPT

## 2024-04-10 PROCEDURE — 99222 1ST HOSP IP/OBS MODERATE 55: CPT

## 2024-04-10 RX ORDER — ACETAMINOPHEN 500 MG
1000 TABLET ORAL ONCE
Refills: 0 | Status: COMPLETED | OUTPATIENT
Start: 2024-04-10 | End: 2024-04-10

## 2024-04-10 RX ORDER — ACETAMINOPHEN 500 MG
1000 TABLET ORAL ONCE
Refills: 0 | Status: COMPLETED | OUTPATIENT
Start: 2024-04-10 | End: 2024-04-11

## 2024-04-10 RX ORDER — POTASSIUM CHLORIDE 20 MEQ
10 PACKET (EA) ORAL
Refills: 0 | Status: COMPLETED | OUTPATIENT
Start: 2024-04-10 | End: 2024-04-10

## 2024-04-10 RX ORDER — ERYTHROMYCIN BASE 5 MG/GRAM
1 OINTMENT (GRAM) OPHTHALMIC (EYE) THREE TIMES A DAY
Refills: 0 | Status: DISCONTINUED | OUTPATIENT
Start: 2024-04-10 | End: 2024-04-25

## 2024-04-10 RX ADMIN — ONDANSETRON 4 MILLIGRAM(S): 8 TABLET, FILM COATED ORAL at 12:38

## 2024-04-10 RX ADMIN — Medication 2 DROP(S): at 11:47

## 2024-04-10 RX ADMIN — MEROPENEM 100 MILLIGRAM(S): 1 INJECTION INTRAVENOUS at 13:56

## 2024-04-10 RX ADMIN — LIDOCAINE 1 PATCH: 4 CREAM TOPICAL at 21:50

## 2024-04-10 RX ADMIN — Medication 2 DROP(S): at 08:59

## 2024-04-10 RX ADMIN — Medication 100 MILLIEQUIVALENT(S): at 12:56

## 2024-04-10 RX ADMIN — Medication 100 MILLIEQUIVALENT(S): at 11:48

## 2024-04-10 RX ADMIN — SODIUM CHLORIDE 50 MILLILITER(S): 9 INJECTION, SOLUTION INTRAVENOUS at 02:51

## 2024-04-10 RX ADMIN — Medication 2 DROP(S): at 02:56

## 2024-04-10 RX ADMIN — Medication 5 MILLILITER(S): at 11:47

## 2024-04-10 RX ADMIN — Medication 2 DROP(S): at 19:31

## 2024-04-10 RX ADMIN — Medication 3 MILLILITER(S): at 23:26

## 2024-04-10 RX ADMIN — CHLORHEXIDINE GLUCONATE 15 MILLILITER(S): 213 SOLUTION TOPICAL at 18:18

## 2024-04-10 RX ADMIN — Medication 400 MILLIGRAM(S): at 12:56

## 2024-04-10 RX ADMIN — Medication 2 DROP(S): at 18:17

## 2024-04-10 RX ADMIN — Medication 5 MILLILITER(S): at 18:18

## 2024-04-10 RX ADMIN — Medication 100 MILLIEQUIVALENT(S): at 10:41

## 2024-04-10 RX ADMIN — Medication 5 MILLILITER(S): at 00:55

## 2024-04-10 RX ADMIN — MEROPENEM 100 MILLIGRAM(S): 1 INJECTION INTRAVENOUS at 06:48

## 2024-04-10 RX ADMIN — Medication 1 APPLICATION(S): at 21:54

## 2024-04-10 RX ADMIN — PANTOPRAZOLE SODIUM 40 MILLIGRAM(S): 20 TABLET, DELAYED RELEASE ORAL at 06:48

## 2024-04-10 RX ADMIN — Medication 87.5 MICROGRAM(S): at 06:48

## 2024-04-10 RX ADMIN — CHLORHEXIDINE GLUCONATE 1 APPLICATION(S): 213 SOLUTION TOPICAL at 06:54

## 2024-04-10 RX ADMIN — Medication 3 MILLILITER(S): at 06:11

## 2024-04-10 RX ADMIN — Medication 1: at 11:48

## 2024-04-10 RX ADMIN — MEROPENEM 100 MILLIGRAM(S): 1 INJECTION INTRAVENOUS at 21:46

## 2024-04-10 RX ADMIN — Medication 3 MILLILITER(S): at 17:17

## 2024-04-10 RX ADMIN — CHLORHEXIDINE GLUCONATE 15 MILLILITER(S): 213 SOLUTION TOPICAL at 06:49

## 2024-04-10 RX ADMIN — Medication 5 MILLILITER(S): at 06:53

## 2024-04-10 RX ADMIN — Medication 3 MILLILITER(S): at 11:22

## 2024-04-10 RX ADMIN — SODIUM CHLORIDE 50 MILLILITER(S): 9 INJECTION, SOLUTION INTRAVENOUS at 21:45

## 2024-04-10 RX ADMIN — Medication 2 DROP(S): at 13:57

## 2024-04-10 RX ADMIN — Medication 2 DROP(S): at 00:55

## 2024-04-10 RX ADMIN — INSULIN GLARGINE 7 UNIT(S): 100 INJECTION, SOLUTION SUBCUTANEOUS at 19:36

## 2024-04-10 RX ADMIN — Medication 2 DROP(S): at 06:52

## 2024-04-10 NOTE — SWALLOW BEDSIDE ASSESSMENT ADULT - SWALLOW EVAL: DIAGNOSIS
Patient p/w a chronic oropharyngeal dysphagia and vent dependent. Limited assessment this date 2/2 mentation and behavioral component. Patient not orienting to feeding task or oral care. Patient p/w a chronic oropharyngeal dysphagia and vent dependent (Trach # 8 Distal XLT Shiley Cuffed). Limited assessment this date 2/2 mentation and behavioral component. Patient not orienting to feeding task or oral care. Patient p/w a chronic trach to vent (Trach # 8 Distal XLT Shiley Cuffed). Limited assessment this date 2/2 mentation and behavioral component. Patient not orienting to feeding task or oral care. Current mentation not supportive of po trials. Of note, patient w/ history of dysphagia as per daughter current and prior verbal reports discussing FEEs from [place] for comfort feeds.

## 2024-04-10 NOTE — PROGRESS NOTE ADULT - ASSESSMENT
75yo woman  h/o T2DM (4/4/24: A1C = 6.2%), HTN, HLD, anemia, hypothyroidism, RA, fibromyalgia, remote cerebral aneurysm repair, acute hypercapnic respiratory failure with tracheostomy, PEG tube (initially placed 2022), and bedbound, recurrent C diff presented for PEG tube dislodgment. Admitted 4/2/24  weight = 54.5 kg    Recurrent C diff - first episode Aug 2023 treated with tapering PO vanco, recurrent episode 1/31/24 treated with PO vanco and then fidaxomicin completed in Feb - most recently tested positive 3/20/24 seen by Dr. Newman 3/29 outpatient, started on fidaxomicin that day - tube feeds concurrently held, unclear if improving afterwards per family  Chronic sacral decubitus wound  3/20 Klebisella bacteruria R only to amp and sputum few Pseudomonas R to FQs w/o polys on gram stian - treatment of urine was deferred to avoid exacerbating C diff    Tmax 100, no leukocytosis  Concern for cellulitis around PEG tube site - area with very minimal erythema, remarkable receded from skin paula placed on admission  UA 11 WBC  CT a/p: no infectious focus or colitis  MRSA PCR+    4/3 IR - PEG exchanged bedside to 20 Fr Kangaroo EnFit  - Bedside XR demonstrates appropriately positioned G-tube with contrast filling into the stomach and bowel.    4/2 Blood Cultures x 2 NGTD;  Positive MRSA/MSSA PCR  4/3 Urine cultures NEG  4/4 fever  4/4 Wound care assessment appreciated:   " area of persistent nonblanchable dark discoloration that is inconsistent with a patient's surrounding skin tone as well as a white juan j film noted over B/L buttocks/sacral skin, area measures approximately 10cm x 10cm x 0cm- presentation is consistent with a deep tissue injury in evolution with incontinence and fungal involvement present on admission. Within the apex of the gluteal cleft/base of sacrum there is full thickness skin loss with red, beefy tissue noted, area measures approximately 3cm x 1cm x 0.3cm- presentation is consistent with a deep tissue injury in evolution with incontinence involvement present on admission."    Antibiotics  Meropenem 4/2 -->4/3  IV Vanco 4/2  Fdaxomicin 4/3-->    PEG site no obvious cellulitis - probably more irritation from displacement/leaked contents, no indication for antimicrobials.         Suggest:  completed  fidaxomicin for total 10 day course for recurrent C diff   on meropenem pending revision of PEG    would restart vanco 125 mg   as suppression as she is back on antibiotics   - monitor bowel movements/output .    we do not need to treat the S. Multiphila in sputum at present    Source of serratia unclear : decubitus but more likely related  to PEG  discussed with GI and team   for replacement of PEG

## 2024-04-10 NOTE — SWALLOW BEDSIDE ASSESSMENT ADULT - SWALLOW EVAL: PATIENT/FAMILY GOALS STATEMENT
daughter, to eat/drink have quality of life; to enjoy her coffee and lemon and tastes of other po items for quality and comfort during life

## 2024-04-10 NOTE — PROGRESS NOTE ADULT - PROBLEM SELECTOR PLAN 1
PEG tube dislodgement on admission   - S/p CT abd/pelvis showing dislodgement of G tube with balloon in the anterior abdominal wall with air filled track to stomach  - initially GI and Surgery consulted   - S/p bedside exchange by IR on 4/3- # 20 Fr Kangaroo EnFit placed   - 4/4: PEG leaking.  Feeds held.  IR adjusted PEG at bedside. G-tube study pending to exclude extravasation. Patient remains NPO except for meds, on IVF, LR at 50ml/hr.  - 4/5: PEG still leaking, adjusted at bedside by IR Attending.  - 4/6: PEG still leaking, IR informed, planned for PEG revision on 4/8 or 4/9  - 4/8-4/9 IR has aborted procedure - GI Dr. Cleaning has been brought onto case for new PEG  - 4/9 PEG fell out spontaneously, Dr. Cleaning to replace today - plan is for eventual new PEG when cultures clear, ?possible temporary NGT upon removal of PEG to allow for closure of PEG site

## 2024-04-10 NOTE — CONSULT NOTE ADULT - ASSESSMENT
73 yo F PMH T2DM on insulin, HTN, HLD, hypothyroidism, RA on Prednisone, Fibromyalgia, cerebral aneurysm s/p repair, acute hypercapnic respiratory failure s/p trach (vent dependent) and PEG (10/22), bedbound presented from home with complaints of possible G tube dislodgement and redness around the site, admitted for further work up, found to be bacteremic with serratia marcescens during admission, consulted for rule out endophthalmitis and bilateral scleral injection.    # Rule out endophthalmitis   - 74F with above medical hx was found to be bacteremic with serratia marcescens, denying blurry vision, only reporting burning of both eyes   - FARAZ VA 2/2 trach, IOP wnl OU, no rAPD OU, EOMs full  - Anterior exam with no hypopion, DFE with no signs of vitritis OU  - No concern for endophthalmitis at this time    # Injection OU   - Pt with hx of RA and sjogrens has bilateral scleral injection that is sectoral, no signs of conjunctivitis   - Possibly due to ocular surface dryness however cannot rule out RA related scleritis given appearance  - Will optimize oculasr surface and continue to monitor for now   - Start erythromycin ointment to both eyes QID (with last application of the day being at bedtime) - ordered  - Start preservative free artificial tears QID OU - ordered  - STOP home tobramycin gtt - no signs of active bacterial infection (discontinued)  - Ophtho to follow    Seen and discussed with Dr. Flores, attending.     Outpatient Follow-up: Patient should follow-up with his/her ophthalmologist or with [facility] Department of Ophthalmology within 1 week of after discharge at:    [address]  [place] [address]  [phone] 75 yo F PMH T2DM on insulin, HTN, HLD, hypothyroidism, RA on Prednisone, Fibromyalgia, cerebral aneurysm s/p repair, acute hypercapnic respiratory failure s/p trach (vent dependent) and PEG (10/22), bedbound presented from home with complaints of possible G tube dislodgement and redness around the site, admitted for further work up, found to be bacteremic with serratia marcescens during admission, consulted for rule out endophthalmitis and bilateral scleral injection.    # Rule out endophthalmitis   - 74F with above medical hx was found to be bacteremic with serratia marcescens, denying blurry vision, only reporting burning of both eyes   - FARAZ VA 2/2 trach, IOP wnl OU, no rAPD OU, EOMs full  - Anterior exam with no hypopion, DFE with no signs of vitritis OU  - No concern for endophthalmitis at this time    # Injection OU   - Pt with hx of RA and sjogrens has bilateral scleral injection that is sectoral, no signs of conjunctivitis   - Possibly due to ocular surface dryness however cannot rule out RA related scleritis given appearance  - Will optimize oculasr surface and continue to monitor for now   - Start erythromycin ointment to both eyes TID (with last application of the day being at bedtime) - ordered  - Start preservative free artificial tears QID OU - ordered  - STOP home tobramycin gtt - no signs of active bacterial infection (discontinued)  - Ophtho to follow    Seen and discussed with Dr. Flores, attending.     Outpatient Follow-up: Patient should follow-up with his/her ophthalmologist or with [facility] Department of Ophthalmology within 1 week of after discharge at:    [address]  [place] [address]  [phone] 75 yo F PMH T2DM on insulin, HTN, HLD, hypothyroidism, RA on Prednisone, Fibromyalgia, cerebral aneurysm s/p repair, acute hypercapnic respiratory failure s/p trach (vent dependent) and PEG (10/22), bedbound presented from home with complaints of possible G tube dislodgement and redness around the site, admitted for further work up, found to be bacteremic with serratia marcescens during admission, consulted for rule out endophthalmitis and bilateral scleral injection.    # Rule out endophthalmitis   - 74F with above medical hx was found to be bacteremic with serratia marcescens, denying blurry vision, only reporting burning of both eyes   - FARAZ VA 2/2 trach, IOP wnl OU, no rAPD OU, EOMs full  - Anterior exam with no hypopyon DFE with no signs of vitritis OU  - No evidence of endophthalmitis at this time    # Injection OU likely 2/2 scleritis vs topical tobra irritation  - Pt with hx of RA and sjogrens has bilateral scleral injection that is sectoral, no signs of conjunctivitis   - Possibly due to ocular surface dryness however cannot rule out RA related scleritis given appearance  - Will optimize ocular surface and continue to monitor closely for now, discussed with family associated rheum process.   - If persists with ocular surface tx recommend rheumatology consult   - Start erythromycin ointment to both eyes TID (with last application of the day being at bedtime) - ordered  - Start preservative free artificial tears QID OU - ordered  - STOP home tobramycin gtt - no signs of active bacterial infection (discontinued)  - Ophtho to follow    Seen and discussed with Dr. Flores, attending.     Outpatient Follow-up: Patient should follow-up with his/her ophthalmologist or with [facility] Department of Ophthalmology within 1 week of after discharge at:    [address]  [place] [address]  [phone]

## 2024-04-10 NOTE — PROGRESS NOTE ADULT - SUBJECTIVE AND OBJECTIVE BOX
Chief Complaint:  Patient is a 74y old  Female who presents with a chief complaint of Dislodged G Tube (10 Apr 2024 19:06)      Date of service 04-10-24 @ 21:16      Interval Events: continued leakage from G tube.    Hospital Medications:  albuterol/ipratropium for Nebulization 3 milliLiter(s) Nebulizer every 6 hours  amLODIPine   Tablet 10 milliGRAM(s) Oral <User Schedule>  artificial tears (preservative free) Ophthalmic Solution 1 Drop(s) Both EYES four times a day  ascorbic acid 500 milliGRAM(s) Oral daily  Biotene Dry Mouth Oral Rinse 5 milliLiter(s) Swish and Spit every 6 hours  calamine/zinc oxide Lotion 1 Application(s) Topical daily PRN  chlorhexidine 0.12% Liquid 15 milliLiter(s) Oral Mucosa every 12 hours  chlorhexidine 4% Liquid 1 Application(s) Topical <User Schedule>  erythromycin   Ointment 1 Application(s) Both EYES three times a day  escitalopram 10 milliGRAM(s) Oral <User Schedule>  fentaNYL   Patch  25 MICROgram(s)/Hr 1 Patch Transdermal every 72 hours  gabapentin Solution 100 milliGRAM(s) Oral <User Schedule>  insulin glargine Injectable (LANTUS) 7 Unit(s) SubCutaneous <User Schedule>  insulin lispro (ADMELOG) corrective regimen sliding scale   SubCutaneous every 6 hours  lactated ringers. 1000 milliLiter(s) IV Continuous <Continuous>  levothyroxine Injectable 87.5 MICROGram(s) IV Push <User Schedule>  lidocaine   4% Patch 1 Patch Transdermal at bedtime  lidocaine   4% Patch 1 Patch Transdermal at bedtime  melatonin Liquid 3 milliGRAM(s) Oral at bedtime  meropenem  IVPB 1000 milliGRAM(s) IV Intermittent every 8 hours  meropenem  IVPB      multivitamin/minerals/iron Oral Solution (CENTRUM) 15 milliLiter(s) Oral daily  ondansetron Injectable 4 milliGRAM(s) IV Push every 8 hours PRN  pantoprazole  Injectable 40 milliGRAM(s) IV Push <User Schedule>  petrolatum Ophthalmic Ointment 1 Application(s) Both EYES every 6 hours PRN  predniSONE  Solution 5 milliGRAM(s) Oral <User Schedule>  QUEtiapine 12.5 milliGRAM(s) Oral <User Schedule>  simethicone 80 milliGRAM(s) Chew every 12 hours  sodium chloride 0.65% Nasal 1 Spray(s) Both Nostrils two times a day PRN  sodium chloride 0.65% Nasal 1 Spray(s) Both Nostrils every 12 hours PRN  traMADol 25 milliGRAM(s) Oral every 8 hours PRN  vancomycin    Solution 125 milliGRAM(s) Oral every 6 hours        PHYSICAL EXAM:   Vital Signs:  Vital Signs Last 24 Hrs  T(C): 36.8 (10 Apr 2024 18:00), Max: 38.7 (10 Apr 2024 12:32)  T(F): 98.2 (10 Apr 2024 18:00), Max: 101.7 (10 Apr 2024 12:32)  HR: 87 (10 Apr 2024 18:00) (80 - 107)  BP: 101/63 (10 Apr 2024 18:00) (101/63 - 154/69)  BP(mean): --  RR: 18 (10 Apr 2024 18:00) (18 - 18)  SpO2: 100% (10 Apr 2024 18:00) (98% - 100%)    Parameters below as of 10 Apr 2024 18:00  Patient On (Oxygen Delivery Method): ventilator      Daily     Daily Weight in k.2 (10 Apr 2024 09:20)      PHYSICAL EXAM:     GENERAL:  Appears stated age, well-groomed, well-nourished, no distress  HEENT:  NC/AT,  conjunctivae anicteric, clear and pink,   NECK: supple, trachea midline  CHEST:  Full & symmetric excursion, no increased effort, breath sounds clear  HEART:  Regular rhythm, no JVD  ABDOMEN:  Soft, non-tender, non-distended, normoactive bowel sounds,  no masses , no hepatosplenomegaly, gastrostomy with emerson cath in place  EXTREMITIES:  no cyanosis,clubbing or edema  SKIN:  No rash, erythema, or, ecchymoses, no jaundice  NEURO:  Alert, non-focal, no asterixis  PSYCH: Appropriate affect, oriented to place and time  RECTAL: Deferred      LABS Personally reviewed by me:                        8.4    6.08  )-----------( 130      ( 10 Apr 2024 09:19 )             29.8     Mean Cell Volume: 85.4 fl (04-10-24 @ 09:19)    04-10    138  |  97  |  <4<L>  ----------------------------<  175<H>  2.8<LL>   |  26  |  <0.30<L>    Ca    8.7      10 Apr 2024 09:19  Phos  2.7     04-10  Mg     1.6     04-10    TPro  6.9  /  Alb  3.2<L>  /  TBili  0.6  /  DBili  x   /  AST  22  /  ALT  19  /  AlkPhos  51  04-10    LIVER FUNCTIONS - ( 10 Apr 2024 09:19 )  Alb: 3.2 g/dL / Pro: 6.9 g/dL / ALK PHOS: 51 U/L / ALT: 19 U/L / AST: 22 U/L / GGT: x             Urinalysis Basic - ( 10 Apr 2024 09:19 )    Color: x / Appearance: x / SG: x / pH: x  Gluc: 175 mg/dL / Ketone: x  / Bili: x / Urobili: x   Blood: x / Protein: x / Nitrite: x   Leuk Esterase: x / RBC: x / WBC x   Sq Epi: x / Non Sq Epi: x / Bacteria: x                              8.4    6.08  )-----------( 130      ( 10 Apr 2024 09:19 )             29.8                         7.2    4.38  )-----------( 106      ( 2024 01:10 )             25.7                         7.3    5.27  )-----------( 100      ( 2024 06:49 )             25.0       Imaging personally reviewed by me:

## 2024-04-10 NOTE — CONSULT NOTE ADULT - SUBJECTIVE AND OBJECTIVE BOX
[facility] DEPARTMENT OF OPHTHALMOLOGY - INITIAL ADULT CONSULT  ----------------------------------------------------------------------------------------------------  Kayden Licona PGY-2  Available on teams  ----------------------------------------------------------------------------------------------------    HPI:  73 yo F PMH T2DM on insulin, HTN, HLD, hypothyroidism, RA on Prednisone, Fibromyalgia, cerebral aneurysm s/p repair, acute hypercapnic respiratory failure s/p trach (vent dependent) and PEG (10/22), bedbound presented from home with complaints of possible G tube dislodgement and redness around the site.  Site is red with small amount of pus at insertion site.  Tender to palpation, warm to touch. CT Abdomen/Pelvis done showing dislodgement of G tube with balloon in the anterior abdominal wall with air filled track to stomach.  GI contacted, but unable to replace at bedside given placement of balloon. Surgery consulted--recommend gastrograffin study to eval placement of PEG and possible IR replacement in AM. Given Vancomycin 1 gm IV x 1 due to concern for possible cellulitis.  Of note, patient hemodynamically stable, afebrile, without leukocytosis on presentation.     Per patient's son at bedside, patient currently being treated for C-diff.  Known infection for past 2-3 weeks.  Treated by Dr. Zion Newman, currently receiving Fidaxomicin.  Also report recent UTI--not currently being treated.    Admitted to MICU for ventilator support and treatment of dislodged PEG/abdominal wall cellulitis.  (02 Apr 2024 23:07)    Interval History: Pt downgraded to RCU. Ophthalmology consulted as pt grew serratia on blood cultures and had bilateral red eyes and burning. Pt with trach/peg, able to respond to yes/no questions. At bedside, patient more somnolent and not answering questions (however reacting while in and out of sleep). Pt family members at bedside to provide history. Reports the pt has had bilateral eye redness for a few months and has not been evaluated by an ophthalmologist. Her PCP prescribed tobra gtt 1-2 months ago. Family at bedside reports the patient wad denying blurrier vision than normal.     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      H/O tracheostomy      PEG (percutaneous endoscopic gastrostomy) status        Past Ocular History: CE OD   Ophthalmic Medications: Tobra gtt QID  FAMILY HISTORY:    MEDICATIONS  (STANDING):  albuterol/ipratropium for Nebulization 3 milliLiter(s) Nebulizer every 6 hours  amLODIPine   Tablet 10 milliGRAM(s) Oral <User Schedule>  artificial tears (preservative free) Ophthalmic Solution 2 Drop(s) Both EYES every 6 hours  ascorbic acid 500 milliGRAM(s) Oral daily  Biotene Dry Mouth Oral Rinse 5 milliLiter(s) Swish and Spit every 6 hours  chlorhexidine 0.12% Liquid 15 milliLiter(s) Oral Mucosa every 12 hours  chlorhexidine 4% Liquid 1 Application(s) Topical <User Schedule>  escitalopram 10 milliGRAM(s) Oral <User Schedule>  fentaNYL   Patch  25 MICROgram(s)/Hr 1 Patch Transdermal every 72 hours  gabapentin Solution 100 milliGRAM(s) Oral <User Schedule>  insulin glargine Injectable (LANTUS) 7 Unit(s) SubCutaneous <User Schedule>  insulin lispro (ADMELOG) corrective regimen sliding scale   SubCutaneous every 6 hours  lactated ringers. 1000 milliLiter(s) (50 mL/Hr) IV Continuous <Continuous>  levothyroxine Injectable 87.5 MICROGram(s) IV Push <User Schedule>  lidocaine   4% Patch 1 Patch Transdermal at bedtime  lidocaine   4% Patch 1 Patch Transdermal at bedtime  melatonin Liquid 3 milliGRAM(s) Oral at bedtime  meropenem  IVPB      meropenem  IVPB 1000 milliGRAM(s) IV Intermittent every 8 hours  multivitamin/minerals/iron Oral Solution (CENTRUM) 15 milliLiter(s) Oral daily  pantoprazole  Injectable 40 milliGRAM(s) IV Push <User Schedule>  predniSONE  Solution 5 milliGRAM(s) Oral <User Schedule>  QUEtiapine 12.5 milliGRAM(s) Oral <User Schedule>  simethicone 80 milliGRAM(s) Chew every 12 hours  tobramycin 0.3% Ophthalmic Solution 2 Drop(s) Both EYES every 6 hours  vancomycin    Solution 125 milliGRAM(s) Oral every 6 hours    MEDICATIONS  (PRN):  calamine/zinc oxide Lotion 1 Application(s) Topical daily PRN Rash and/or Itching  ondansetron Injectable 4 milliGRAM(s) IV Push every 8 hours PRN Nausea and/or Vomiting  petrolatum Ophthalmic Ointment 1 Application(s) Both EYES every 6 hours PRN eye dryness  sodium chloride 0.65% Nasal 1 Spray(s) Both Nostrils two times a day PRN Nasal Congestion  sodium chloride 0.65% Nasal 1 Spray(s) Both Nostrils every 12 hours PRN Congestion  traMADol 25 milliGRAM(s) Oral every 8 hours PRN Mild Pain (1 - 3)    Allergies & Intolerances:   pineapple (Unknown)  Tagamet (Unknown)  heparin (Unknown)  walnut (Unknown)  metronidazole (Rash)  penicillin (Unknown)  Lyrica (Unknown)  Pecan, Filbert, Hazelnut (Unknown)    Review of Systems:  Constitutional: No fever, chills  Eyes: See HPI  Neuro: No tremors  Cardiovascular: No chest pain, palpitations  Respiratory: No SOB, no cough  GI: No nausea, vomiting, abdominal pain  : No dysuria  Skin: no rash  Psych: no depression  Endocrine: no polyuria, polydipsia  Heme/lymph: no swelling    VITALS: T(C): 36.8 (04-10-24 @ 18:00)  T(F): 98.2 (04-10-24 @ 18:00), Max: 101.7 (04-10-24 @ 12:32)  HR: 87 (04-10-24 @ 18:00) (80 - 107)  BP: 101/63 (04-10-24 @ 18:00) (101/63 - 154/69)  RR:  (18 - 18)  SpO2:  (98% - 100%)  Wt(kg): --  General: AAO x 3, increased somnolence during encounter with les participation    Ophthalmology Exam:  Visual acuity (sc): FARAZ 2/2 trach and participation  Pupils: PERRL OU, no APD  Ttono: 18 OU   Extraocular movements (EOMs): Full OU, no pain, no diplopia  Confrontational Visual Field (CVF): FARAZ 2/2 trach and participation  Color Plates: FARAZ 2/2 trach and participation    Pen Light Exam (PLE)  External: Flat OU  Lids/Lashes/Lacrimal Ducts: Stye RUL OD ; Flat OS  Sclera/Conjunctiva: OD: sectoral injection temporal and inferior with temporal pigmented area ; OS: sectoral injection temporal and inferior  Cornea: OD: 3 + SPK ; OS: 4+ SPK   Anterior Chamber: D+F OU    Iris: Flat OU  Lens: PCIOL OD; OS: dense cataract OS     Fundus Exam: dilated with 1% tropicamide and 2.5% phenylephrine  Approval obtained from primary team for dilation  Patient aware that pupils can remained dilated for at least 4-6 hours  Exam performed with 20D lens    Vitreous: wnl OU  Disc, cup/disc: sharp and pink, 0.4 OU  Macula: OD: few DBH OD ; OS: few DBH   Vessels: wnl OU  Periphery: wnl OU [facility] DEPARTMENT OF OPHTHALMOLOGY - INITIAL ADULT CONSULT  ----------------------------------------------------------------------------------------------------  Kayden Licona PGY-2  Available on teams  ----------------------------------------------------------------------------------------------------    HPI:  75 yo F PMH T2DM on insulin, HTN, HLD, hypothyroidism, RA on Prednisone, Fibromyalgia, cerebral aneurysm s/p repair, acute hypercapnic respiratory failure s/p trach (vent dependent) and PEG (10/22), bedbound presented from home with complaints of possible G tube dislodgement and redness around the site.  Site is red with small amount of pus at insertion site.  Tender to palpation, warm to touch. CT Abdomen/Pelvis done showing dislodgement of G tube with balloon in the anterior abdominal wall with air filled track to stomach.  GI contacted, but unable to replace at bedside given placement of balloon. Surgery consulted--recommend gastrograffin study to eval placement of PEG and possible IR replacement in AM. Given Vancomycin 1 gm IV x 1 due to concern for possible cellulitis.  Of note, patient hemodynamically stable, afebrile, without leukocytosis on presentation.     Per patient's son at bedside, patient currently being treated for C-diff.  Known infection for past 2-3 weeks.  Treated by Dr. Zion Newman, currently receiving Fidaxomicin.  Also report recent UTI--not currently being treated.    Admitted to MICU for ventilator support and treatment of dislodged PEG/abdominal wall cellulitis.  (02 Apr 2024 23:07)    Interval History: Pt downgraded to RCU. Ophthalmology consulted as pt grew serratia on blood cultures and had bilateral red eyes and burning. Pt with trach/peg, able to respond to yes/no questions. At bedside, patient more somnolent and not answering questions (however reacting while in and out of sleep). Pt family members at bedside to provide history. Reports the pt has had bilateral eye redness for a few months and has not been evaluated by an ophthalmologist. Her PCP prescribed tobra gtt 1-2 months ago. Family at bedside reports the patient wad denying blurrier vision than normal.     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      H/O tracheostomy      PEG (percutaneous endoscopic gastrostomy) status        Past Ocular History: CE OD   Ophthalmic Medications: Tobra gtt QID  FAMILY HISTORY:    MEDICATIONS  (STANDING):  albuterol/ipratropium for Nebulization 3 milliLiter(s) Nebulizer every 6 hours  amLODIPine   Tablet 10 milliGRAM(s) Oral <User Schedule>  artificial tears (preservative free) Ophthalmic Solution 2 Drop(s) Both EYES every 6 hours  ascorbic acid 500 milliGRAM(s) Oral daily  Biotene Dry Mouth Oral Rinse 5 milliLiter(s) Swish and Spit every 6 hours  chlorhexidine 0.12% Liquid 15 milliLiter(s) Oral Mucosa every 12 hours  chlorhexidine 4% Liquid 1 Application(s) Topical <User Schedule>  escitalopram 10 milliGRAM(s) Oral <User Schedule>  fentaNYL   Patch  25 MICROgram(s)/Hr 1 Patch Transdermal every 72 hours  gabapentin Solution 100 milliGRAM(s) Oral <User Schedule>  insulin glargine Injectable (LANTUS) 7 Unit(s) SubCutaneous <User Schedule>  insulin lispro (ADMELOG) corrective regimen sliding scale   SubCutaneous every 6 hours  lactated ringers. 1000 milliLiter(s) (50 mL/Hr) IV Continuous <Continuous>  levothyroxine Injectable 87.5 MICROGram(s) IV Push <User Schedule>  lidocaine   4% Patch 1 Patch Transdermal at bedtime  lidocaine   4% Patch 1 Patch Transdermal at bedtime  melatonin Liquid 3 milliGRAM(s) Oral at bedtime  meropenem  IVPB      meropenem  IVPB 1000 milliGRAM(s) IV Intermittent every 8 hours  multivitamin/minerals/iron Oral Solution (CENTRUM) 15 milliLiter(s) Oral daily  pantoprazole  Injectable 40 milliGRAM(s) IV Push <User Schedule>  predniSONE  Solution 5 milliGRAM(s) Oral <User Schedule>  QUEtiapine 12.5 milliGRAM(s) Oral <User Schedule>  simethicone 80 milliGRAM(s) Chew every 12 hours  tobramycin 0.3% Ophthalmic Solution 2 Drop(s) Both EYES every 6 hours  vancomycin    Solution 125 milliGRAM(s) Oral every 6 hours    MEDICATIONS  (PRN):  calamine/zinc oxide Lotion 1 Application(s) Topical daily PRN Rash and/or Itching  ondansetron Injectable 4 milliGRAM(s) IV Push every 8 hours PRN Nausea and/or Vomiting  petrolatum Ophthalmic Ointment 1 Application(s) Both EYES every 6 hours PRN eye dryness  sodium chloride 0.65% Nasal 1 Spray(s) Both Nostrils two times a day PRN Nasal Congestion  sodium chloride 0.65% Nasal 1 Spray(s) Both Nostrils every 12 hours PRN Congestion  traMADol 25 milliGRAM(s) Oral every 8 hours PRN Mild Pain (1 - 3)    Allergies & Intolerances:   pineapple (Unknown)  Tagamet (Unknown)  heparin (Unknown)  walnut (Unknown)  metronidazole (Rash)  penicillin (Unknown)  Lyrica (Unknown)  Pecan, Filbert, Hazelnut (Unknown)    Review of Systems:  Constitutional: No fever, chills  Eyes: See HPI  Neuro: No tremors  Cardiovascular: No chest pain, palpitations  Respiratory: No SOB, no cough  GI: No nausea, vomiting, abdominal pain  : No dysuria  Skin: no rash  Psych: no depression  Endocrine: no polyuria, polydipsia  Heme/lymph: no swelling    VITALS: T(C): 36.8 (04-10-24 @ 18:00)  T(F): 98.2 (04-10-24 @ 18:00), Max: 101.7 (04-10-24 @ 12:32)  HR: 87 (04-10-24 @ 18:00) (80 - 107)  BP: 101/63 (04-10-24 @ 18:00) (101/63 - 154/69)  RR:  (18 - 18)  SpO2:  (98% - 100%)  Wt(kg): --  General: AAO x 3, increased somnolence during encounter with les participation    Ophthalmology Exam:  Visual acuity (sc): FARAZ 2/2 trach and participation  Pupils: PERRL OU, no APD  Ttono: 18 OU   Extraocular movements (EOMs): Full OU, no pain, no diplopia  Confrontational Visual Field (CVF): FARAZ 2/2 trach and participation  Color Plates: FARAZ 2/2 trach and participation    Pen Light Exam (PLE)  External: Flat OU  Lids/Lashes/Lacrimal Ducts: chalazion RUL OD ; Flat OS  Sclera/Conjunctiva: OD: sectoral injection temporal and inferior with temporal pigmented area ; OS: sectoral injection temporal and inferior  Cornea: OD: 3 + SPK ; OS: 4+ SPK   Anterior Chamber: D+F OU    Iris: Flat OU  Lens: PCIOL OD; OS: dense cataract OS     Fundus Exam: dilated with 1% tropicamide and 2.5% phenylephrine  Approval obtained from primary team for dilation  Patient aware that pupils can remained dilated for at least 4-6 hours  Exam performed with 20D lens    Vitreous: wnl OU  Disc, cup/disc: sharp and pink, 0.4 OU  Macula: OD: few DBH OD ; OS: few DBH   Vessels: wnl OU  Periphery: wnl OU

## 2024-04-10 NOTE — CHART NOTE - NSCHARTNOTEFT_GEN_A_CORE
Nutrition Follow Up Note  Patient seen for: Nutrition Follow up     Per chart review, 73yo woman  h/o T2DM (4/4/24: A1C = 6.2%), HTN, HLD, anemia, hypothyroidism, RA, fibromyalgia, remote cerebral aneurysm repair, acute hypercapnic respiratory failure with tracheostomy, PEG tube (initially placed 2022), and bedbound, recurrent C diff presented for PEG tube dislodgment.    Source: [x] Patient       [x] Medical Record        [] RN        [] Family at bedside       [] Other:    -If unable to interview patient: [] Trach/Vent/BiPAP  [] Disoriented/confused/inappropriate to interview    Diet Order:   Diet, NPO (04-10-24)   --Previously ordered for [employer] Peptide 1.5 @ 60 ml/hr x 16 hours. (4/08-4/10)  --To note, pt receiving <80% of EN regimen x >/7 days per nursing flow sheets.     - Is current order appropriate/adequate? [] Yes  []  No: see recommendations below     - PO intake :   [x] N/A     - Nutrition-related concerns:      - Visited pt at bedside,       - GI following for PEG tube dislodgement on admission. ?possible temporary NGT upon removal of PEG to allow for closure of PEG site.      - ID following; CDiff+; pt on antibiotics.       - Endo: Hx of DM; BG being managed with Glargine 7U, SSI. Ordered for Decadron (may elevate BG levels).       - Renal: Noted hypokalemic; s/p repletion. IVF: Lactated Ringers 50 ml/hr      - Ordered for IV Levothyroxine.      - Ordered for multivitamin, Vitamin C to help aid in wound healing.     GI: Rectal tube output: 50 ml (4/09). Bowel Regimen? [] Yes   [x] No      Weights:   Dosing weight: 54.5 kg (4-02)  UBW: 90 pounds   Daily: no available weights to assess.     Nutritionally Pertinent MEDICATIONS  (STANDING):  amLODIPine   Tablet  ascorbic acid  insulin glargine Injectable (LANTUS)  insulin lispro (ADMELOG) corrective regimen sliding scale  lactated ringers.  levothyroxine Injectable  meropenem  IVPB  meropenem  IVPB  multivitamin/minerals/iron Oral Solution (CENTRUM)  pantoprazole  Injectable  predniSONE  Solution  simethicone  vancomycin    Solution    Pertinent Labs: 04-10 @ 09:19: Na 138, BUN <4<L>, Cr <0.30<L>, <H>, K+ 2.8<LL>, Phos 2.7, Mg 1.6, Alk Phos 51, ALT/SGPT 19, AST/SGOT 22, HbA1c --    A1C with Estimated Average Glucose Result: 6.2 % (04-04-24 @ 07:40)  A1C with Estimated Average Glucose Result: 7.5 % (10-28-23 @ 07:18)    Finger Sticks:  POCT Blood Glucose.: 177 mg/dL (04-10 @ 11:33)  POCT Blood Glucose.: 144 mg/dL (04-10 @ 05:46)  POCT Blood Glucose.: 128 mg/dL (04-09 @ 23:54)  POCT Blood Glucose.: 159 mg/dL (04-09 @ 17:27)      Skin per wound care note (4/04): B/L buttocks/sacral deep tissue injury in evolution, present on admission  Edema per nursing documentation: No peripheral edema noted per nursing flow sheets     Estimated Needs: Based on dosing weight 54.5 kg   Energy needs: 1893-8895 kcal/kg (27-32 kcal/kg)  Protein needs: 65.4-81.75 g/kg (1.2-1.5 g/kg)  Fluid needs:  deferred to team   [x] no change since previous assessment  [] recalculated:     Previous Nutrition Diagnosis:   1. Increased Nutrient Needs   Nutrition Diagnosis is: [x] ongoing  [] resolved [] not applicable     Nutrition Care Plan:  [x] In Progress  [] Achieved  [] Not applicable    New Nutrition Diagnosis: [x] Not applicable    Nutrition Interventions:     Education Provided   [] Yes:  [] No:     Recommendations:      1. As able, recommend [employer] Peptide 1.5 @ 65mL/hr x 16hrs is reached. Regimen at goal provides 960 mL total volume, 1476kcal/d and 71g pro/day (27kcal/kg and 1.3g/kg) based on current weight 54.5 kg  -- 16 hour feeds as per pt's daughter's request to run tube feeds for less than 20 hours to allow pt time off the pump.    2. Monitor GI tolerance. RD to remain available to adjust EN formulary, volume/rate PRN.   3. Continue micronutrient supplementation: multivitamin and Vitamin C. When able, consider Alfred twice daily to aid in wound healing.       Monitoring and Evaluation:   Continue to monitor nutritional intake, tolerance to diet prescription, weights, labs, skin integrity    RD remains available upon request and will follow up per protocol  Mel Li RDN CDN (TEAMS) Nutrition Follow Up Note  Patient seen for: Nutrition Follow up     Per chart review, 75yo woman  h/o T2DM (4/4/24: A1C = 6.2%), HTN, HLD, anemia, hypothyroidism, RA, fibromyalgia, remote cerebral aneurysm repair, acute hypercapnic respiratory failure with tracheostomy, PEG tube (initially placed 2022), and bedbound, recurrent C diff presented for PEG tube dislodgment.    Source: [x] Patient       [x] Medical Record        [] RN        [] Family at bedside       [] Other:    -If unable to interview patient: [] Trach/Vent/BiPAP  [] Disoriented/confused/inappropriate to interview    Diet Order:   Diet, NPO (04-10-24)   --Previously ordered for [employer] Peptide 1.5 @ 60 ml/hr x 16 hours. (4/08-4/10)  --To note, pt receiving <80% of EN regimen x >/7 days per nursing flow sheets.     - Is current order appropriate/adequate? [] Yes  []  No: see recommendations below     - PO intake :   [x] N/A     - Nutrition-related concerns:      - Visited pt at bedside, per team, tube feeds being held intermittently in setting of PEG revisions. Tube feeds on hold during RD visit.       - GI following for PEG tube dislodgement on admission. ?possible temporary NGT upon removal of PEG to allow for closure of PEG site.      - ID following; CDiff+; pt on antibiotics.       - Endo: Hx of DM; BG being managed with Glargine 7U, SSI. Ordered for Decadron (may elevate BG levels).       - Renal: Noted hypokalemic; s/p repletion. IVF: Lactated Ringers 50 ml/hr      - Ordered for IV Levothyroxine.      - Ordered for multivitamin, Vitamin C to help aid in wound healing.     GI: Rectal tube output: 50 ml (4/09). Bowel Regimen? [] Yes   [x] No      Weights:   Dosing weight: 54.5 kg (4-02)  UBW: 90 pounds   Daily: no available weights to assess.   --Unable to conduct Nutrition Focused Physical Exam at this time.     Nutritionally Pertinent MEDICATIONS  (STANDING):  amLODIPine   Tablet  ascorbic acid  insulin glargine Injectable (LANTUS)  insulin lispro (ADMELOG) corrective regimen sliding scale  lactated ringers.  levothyroxine Injectable  meropenem  IVPB  meropenem  IVPB  multivitamin/minerals/iron Oral Solution (CENTRUM)  pantoprazole  Injectable  predniSONE  Solution  simethicone  vancomycin    Solution    Pertinent Labs: 04-10 @ 09:19: Na 138, BUN <4<L>, Cr <0.30<L>, <H>, K+ 2.8<LL>, Phos 2.7, Mg 1.6, Alk Phos 51, ALT/SGPT 19, AST/SGOT 22, HbA1c --    A1C with Estimated Average Glucose Result: 6.2 % (04-04-24 @ 07:40)  A1C with Estimated Average Glucose Result: 7.5 % (10-28-23 @ 07:18)    Finger Sticks:  POCT Blood Glucose.: 177 mg/dL (04-10 @ 11:33)  POCT Blood Glucose.: 144 mg/dL (04-10 @ 05:46)  POCT Blood Glucose.: 128 mg/dL (04-09 @ 23:54)  POCT Blood Glucose.: 159 mg/dL (04-09 @ 17:27)      Skin per wound care note (4/04): B/L buttocks/sacral deep tissue injury in evolution, present on admission  Edema per nursing documentation: No peripheral edema noted per nursing flow sheets     Estimated Needs: Based on dosing weight 54.5 kg   Energy needs: 2207-2522 kcal/kg (27-32 kcal/kg)  Protein needs: 65.4-81.75 g/kg (1.2-1.5 g/kg)  Fluid needs:  deferred to team   [x] no change since previous assessment  [] recalculated:     Previous Nutrition Diagnosis:   1. Increased Nutrient Needs   Nutrition Diagnosis is: [x] ongoing  [] resolved [] not applicable     Nutrition Care Plan:  [x] In Progress  [] Achieved  [] Not applicable    New Nutrition Diagnosis: [x] Not applicable    Nutrition Interventions:     Education Provided   [] Yes:  [] No:     Recommendations:      1. As able, recommend [employer] Peptide 1.5 @ 65mL/hr x 16hrs is reached. Regimen at goal provides 960 mL total volume, 1476kcal/d and 71g pro/day (27kcal/kg and 1.3g/kg) based on current weight 54.5 kg  -- 16 hour feeds as per pt's daughter's request to run tube feeds for less than 20 hours to allow pt time off the pump.    -- Discussed with BERNA Narayan, continued concern for shorter feeding time; would like to consider Two [place] if needed. Will continue to monitor tolerance to current formula/rate and will reassess as needed/able.   2. Monitor GI tolerance. RD to remain available to adjust EN formulary, volume/rate PRN.   3. Continue micronutrient supplementation: multivitamin and Vitamin C. When able, consider Alfred twice daily to aid in wound healing.       Monitoring and Evaluation:   Continue to monitor nutritional intake, tolerance to diet prescription, weights, labs, skin integrity    RD remains available upon request and will follow up per protocol  Mel Li RDN CDN (TEAMS) Nutrition Follow Up Note  Patient seen for: Nutrition Follow up     Per chart review, 75yo woman  h/o T2DM (4/4/24: A1C = 6.2%), HTN, HLD, anemia, hypothyroidism, RA, fibromyalgia, remote cerebral aneurysm repair, acute hypercapnic respiratory failure with tracheostomy, PEG tube (initially placed 2022), and bedbound, recurrent C diff presented for PEG tube dislodgment.    Source: [x] Patient       [x] Medical Record        [] RN        [] Family at bedside       [] Other:    -If unable to interview patient: [] Trach/Vent/BiPAP  [] Disoriented/confused/inappropriate to interview    Diet Order:   Diet, NPO (04-10-24)   --NPO in setting of PEG revision.   --Previously ordered for [employer] Peptide 1.5 @ 60 ml/hr x 16 hours. (4/08-4/10)  --To note, pt receiving <80% of EN regimen x >/7 days per nursing flow sheets.     - Is current order appropriate/adequate? [] Yes  []  No: see recommendations below     - PO intake :   [x] N/A     - Nutrition-related concerns:      - Visited pt at bedside, per team, tube feeds being held intermittently in setting of PEG revisions. Tube feeds on hold during RD visit.       - GI following for PEG tube dislodgement on admission. ?possible temporary NGT upon removal of PEG to allow for closure of PEG site.      - ID following; CDiff+; pt on antibiotics.       - Endo: Hx of DM; BG being managed with Glargine 7U, SSI. Ordered for Decadron (may elevate BG levels).       - Renal: Noted hypokalemic; s/p repletion. IVF: Lactated Ringers 50 ml/hr      - Ordered for IV Levothyroxine.      - Ordered for multivitamin, Vitamin C to help aid in wound healing.     GI: Rectal tube output: 50 ml (4/09). Bowel Regimen? [] Yes   [x] No      Weights:   Dosing weight: 54.5 kg (4-02)  UBW: 90 pounds   Daily: no available weights to assess.   --Unable to conduct Nutrition Focused Physical Exam at this time.     Nutritionally Pertinent MEDICATIONS  (STANDING):  amLODIPine   Tablet  ascorbic acid  insulin glargine Injectable (LANTUS)  insulin lispro (ADMELOG) corrective regimen sliding scale  lactated ringers.  levothyroxine Injectable  meropenem  IVPB  meropenem  IVPB  multivitamin/minerals/iron Oral Solution (CENTRUM)  pantoprazole  Injectable  predniSONE  Solution  simethicone  vancomycin    Solution    Pertinent Labs: 04-10 @ 09:19: Na 138, BUN <4<L>, Cr <0.30<L>, <H>, K+ 2.8<LL>, Phos 2.7, Mg 1.6, Alk Phos 51, ALT/SGPT 19, AST/SGOT 22, HbA1c --    A1C with Estimated Average Glucose Result: 6.2 % (04-04-24 @ 07:40)  A1C with Estimated Average Glucose Result: 7.5 % (10-28-23 @ 07:18)    Finger Sticks:  POCT Blood Glucose.: 177 mg/dL (04-10 @ 11:33)  POCT Blood Glucose.: 144 mg/dL (04-10 @ 05:46)  POCT Blood Glucose.: 128 mg/dL (04-09 @ 23:54)  POCT Blood Glucose.: 159 mg/dL (04-09 @ 17:27)      Skin per wound care note (4/04): B/L buttocks/sacral deep tissue injury in evolution, present on admission  Edema per nursing documentation: No peripheral edema noted per nursing flow sheets     Estimated Needs: Based on dosing weight 54.5 kg   Energy needs: 1492-4960 kcal/kg (27-32 kcal/kg)  Protein needs: 65.4-81.75 g/kg (1.2-1.5 g/kg)  Fluid needs:  deferred to team   [x] no change since previous assessment  [] recalculated:     Previous Nutrition Diagnosis:   1. Increased Nutrient Needs   Nutrition Diagnosis is: [x] ongoing  [] resolved [] not applicable     Nutrition Care Plan:  [x] In Progress  [] Achieved  [] Not applicable    New Nutrition Diagnosis: [x] Not applicable    Nutrition Interventions:     Education Provided   [] Yes:  [] No:     Recommendations:      1. As able, recommend [employer] Peptide 1.5 @ 65mL/hr x 16hrs is reached. Regimen at goal provides 960 mL total volume, 1476kcal/d and 71g pro/day (27kcal/kg and 1.3g/kg) based on current weight 54.5 kg  -- 16 hour feeds as per pt's daughter's request to run tube feeds for less than 20 hours to allow pt time off the pump.    -- Discussed with BERNA Narayan, continued concern for shorter feeding time; would like to consider Two [place] if needed. Will continue to monitor tolerance to current formula/rate and will reassess as needed/able.   2. Monitor GI tolerance. RD to remain available to adjust EN formulary, volume/rate PRN.   3. Continue micronutrient supplementation: multivitamin and Vitamin C. When able, consider Alfred twice daily to aid in wound healing.       Monitoring and Evaluation:   Continue to monitor nutritional intake, tolerance to diet prescription, weights, labs, skin integrity    RD remains available upon request and will follow up per protocol  Mel Li RDN CDN (TEAMS)

## 2024-04-10 NOTE — PROGRESS NOTE ADULT - SUBJECTIVE AND OBJECTIVE BOX
infectious diseases progress note:    Patient is a 74y old  Female who presents with a chief complaint of Dislodged G Tube (10 Apr 2024 07:25)        Cellulitis of abdominal wall               Allergies    pineapple (Unknown)  Tagamet (Unknown)  heparin (Unknown)  walnut (Unknown)  metronidazole (Rash)  penicillin (Unknown)  Lyrica (Unknown)  Pecan, Filbert, Hazelnut (Unknown)    Intolerances        ANTIBIOTICS/RELEVANT:  antimicrobials  meropenem  IVPB 1000 milliGRAM(s) IV Intermittent every 8 hours  meropenem  IVPB      vancomycin    Solution 125 milliGRAM(s) Oral every 6 hours    immunologic:    OTHER:  acetaminophen   Oral Liquid .. 1000 milliGRAM(s) Oral every 6 hours PRN  albuterol/ipratropium for Nebulization 3 milliLiter(s) Nebulizer every 6 hours  amLODIPine   Tablet 10 milliGRAM(s) Oral <User Schedule>  artificial tears (preservative free) Ophthalmic Solution 2 Drop(s) Both EYES every 6 hours  ascorbic acid 500 milliGRAM(s) Oral daily  Biotene Dry Mouth Oral Rinse 5 milliLiter(s) Swish and Spit every 6 hours  calamine/zinc oxide Lotion 1 Application(s) Topical daily PRN  chlorhexidine 0.12% Liquid 15 milliLiter(s) Oral Mucosa every 12 hours  chlorhexidine 4% Liquid 1 Application(s) Topical <User Schedule>  escitalopram 10 milliGRAM(s) Oral <User Schedule>  fentaNYL   Patch  25 MICROgram(s)/Hr 1 Patch Transdermal every 72 hours  gabapentin Solution 100 milliGRAM(s) Oral <User Schedule>  insulin glargine Injectable (LANTUS) 7 Unit(s) SubCutaneous <User Schedule>  insulin lispro (ADMELOG) corrective regimen sliding scale   SubCutaneous every 6 hours  lactated ringers. 1000 milliLiter(s) IV Continuous <Continuous>  levothyroxine Injectable 87.5 MICROGram(s) IV Push <User Schedule>  lidocaine   4% Patch 1 Patch Transdermal at bedtime  lidocaine   4% Patch 1 Patch Transdermal at bedtime  melatonin Liquid 3 milliGRAM(s) Oral at bedtime  multivitamin/minerals/iron Oral Solution (CENTRUM) 15 milliLiter(s) Oral daily  ondansetron Injectable 4 milliGRAM(s) IV Push every 8 hours PRN  pantoprazole  Injectable 40 milliGRAM(s) IV Push <User Schedule>  petrolatum Ophthalmic Ointment 1 Application(s) Both EYES every 6 hours PRN  predniSONE  Solution 5 milliGRAM(s) Oral <User Schedule>  QUEtiapine 12.5 milliGRAM(s) Oral <User Schedule>  simethicone 80 milliGRAM(s) Chew every 12 hours  sodium chloride 0.65% Nasal 1 Spray(s) Both Nostrils two times a day PRN  sodium chloride 0.65% Nasal 1 Spray(s) Both Nostrils every 12 hours PRN  tobramycin 0.3% Ophthalmic Solution 2 Drop(s) Both EYES every 6 hours  traMADol 25 milliGRAM(s) Oral every 8 hours PRN      Objective:  Vital Signs Last 24 Hrs  T(C): 37 (10 Apr 2024 05:52), Max: 37.2 (09 Apr 2024 13:48)  T(F): 98.6 (10 Apr 2024 05:52), Max: 98.9 (09 Apr 2024 13:48)  HR: 95 (10 Apr 2024 05:52) (73 - 95)  BP: 154/69 (10 Apr 2024 05:52) (118/66 - 154/69)  BP(mean): --  RR: 18 (10 Apr 2024 05:52) (18 - 18)  SpO2: 99% (10 Apr 2024 05:52) (98% - 100%)    Parameters below as of 10 Apr 2024 05:52  Patient On (Oxygen Delivery Method): ventilator           Ear/Nose/Throat: no oral lesion, no sinus tenderness on percussion	  Neck:no JVD, no lymphadenopathy, supple  Respiratory: CTA ruth  Cardiovascular: S1S2 RRR, no murmurs  Gastrointestinal:soft, (+) BS, no HSM  Extremities:no e/e/c        LABS:                        7.2    4.38  )-----------( 106      ( 09 Apr 2024 01:10 )             25.7     04-09    140  |  102  |  13  ----------------------------<  155<H>  4.0   |  26  |  0.17<L>    Ca    8.7      09 Apr 2024 01:10  Phos  2.7     04-09  Mg     2.0     04-09    TPro  6.4  /  Alb  2.9<L>  /  TBili  0.2  /  DBili  x   /  AST  25  /  ALT  20  /  AlkPhos  46  04-09      Urinalysis Basic - ( 09 Apr 2024 01:10 )    Color: x / Appearance: x / SG: x / pH: x  Gluc: 155 mg/dL / Ketone: x  / Bili: x / Urobili: x   Blood: x / Protein: x / Nitrite: x   Leuk Esterase: x / RBC: x / WBC x   Sq Epi: x / Non Sq Epi: x / Bacteria: x          MICROBIOLOGY:    RECENT CULTURES:  04-07 @ 14:31 .Sputum Sputum   LENIN    Numerous polymorphonuclear leukocytes per low power field  Few Squamous epithelial cells per low power field  Numerous Gram Negative Rods per oil power field  Few Gram Positive Rods per oil power field    Stenotrophomonas maltophilia  Stenotrophomonas maltophilia     Mixed gram negative rods Culture includes  Moderate Stenotrophomonas maltophilia  Few Pseudomonas aeruginosa    04-07 @ 07:21 .Blood Blood-Peripheral   PCR    Growth in anaerobic bottle: Gram Variable Rods    Blood Culture PCR  Blood Culture PCR     No growth at 48 Hours    04-05 @ 06:43 .Sputum Sputum   LENIN    Few polymorphonuclear leukocytes per low power field  Few Squamous epithelial cells per low power field  Numerous Gram Negative Rods seen per oil power field  Moderate Gram Positive Rods seen per oil power field    Pseudomonas aeruginosa  Pseudomonas aeruginosa     Numerous Pseudomonas aeruginosa  Normal Respiratory Shanda present    04-04 @ 21:00 .Blood Blood-Peripheral                No growth at 5 days          RESPIRATORY CULTURES:              RADIOLOGY & ADDITIONAL STUDIES:        Pager [phone]  After 5 pm/weekends or if no response :[phone]

## 2024-04-10 NOTE — PROGRESS NOTE ADULT - PROBLEM SELECTOR PLAN 3
intermittently persistent high fevers  - IV Vanco/meropenem x1 on admission for concern for cellulitis  - 4/2 blood cultures negative x2, MRSA/MSSA PCR +  - 4/3 urine culture negative, CT A/O - no infectious focus or colitis  - 4/5 Procal 6.6 increased from 0.08 on 4/3, lactate 4.3  - 4/7 fever, meropenem restarted - no leukocytosis - RVP neg, UA neg, dopplers neg  - 4/7 blood cultures positive for serratia  - continue with meropenem pending sensitivities  - ID recs appreciated

## 2024-04-10 NOTE — PROGRESS NOTE ADULT - SUBJECTIVE AND OBJECTIVE BOX
Patient is a 74y old  Female who presents with a chief complaint of Dislodged G Tube (09 Apr 2024 12:32)      Interval Events:    REVIEW OF SYSTEMS:  [ ] Positive  [ ] All other systems negative  [ ] Unable to assess ROS because ________    Vital Signs Last 24 Hrs  T(C): 37 (04-10-24 @ 05:52), Max: 37.2 (04-09-24 @ 13:48)  T(F): 98.6 (04-10-24 @ 05:52), Max: 98.9 (04-09-24 @ 13:48)  HR: 95 (04-10-24 @ 05:52) (73 - 95)  BP: 154/69 (04-10-24 @ 05:52) (118/66 - 154/69)  RR: 18 (04-10-24 @ 05:52) (18 - 18)  SpO2: 99% (04-10-24 @ 05:52) (98% - 100%)    PHYSICAL EXAM:  HEENT:   [ ]Tracheostomy:  [ ]Pupils equal  [ ]No oral lesions  [ ]Abnormal    SKIN  [ ]No Rash  [ ] Abnormal  [ ] pressure    CARDIAC  [ ]Regular  [ ]Abnormal    PULMONARY  [ ]Bilateral Clear Breath Sounds  [ ]Normal Excursion  [ ]Abnormal    GI  [ ]PEG      [ ] +BS		              [ ]Soft, nondistended, nontender	  [ ]Abnormal    MUSCULOSKELETAL                                   [ ]Bedbound                 [ ]Abnormal    [ ]Ambulatory/OOB to chair                           EXTREMITIES                                         [ ]Normal  [ ]Edema                           NEUROLOGIC  [ ] Normal, non focal  [ ] Focal findings:    PSYCHIATRIC  [ ]Alert and appropriate  [ ] Sedated	 [ ]Agitated    :  Mcbride: [ ] Yes, if yes: Date of Placement:                   [  ] No    LINES: Central Lines [ ] Yes, if yes: Date of Placement                                     [  ] No    HOSPITAL MEDICATIONS:  MEDICATIONS  (STANDING):  albuterol/ipratropium for Nebulization 3 milliLiter(s) Nebulizer every 6 hours  amLODIPine   Tablet 10 milliGRAM(s) Oral <User Schedule>  artificial tears (preservative free) Ophthalmic Solution 2 Drop(s) Both EYES every 6 hours  ascorbic acid 500 milliGRAM(s) Oral daily  Biotene Dry Mouth Oral Rinse 5 milliLiter(s) Swish and Spit every 6 hours  chlorhexidine 0.12% Liquid 15 milliLiter(s) Oral Mucosa every 12 hours  chlorhexidine 4% Liquid 1 Application(s) Topical <User Schedule>  escitalopram 10 milliGRAM(s) Oral <User Schedule>  fentaNYL   Patch  25 MICROgram(s)/Hr 1 Patch Transdermal every 72 hours  gabapentin Solution 100 milliGRAM(s) Oral <User Schedule>  insulin glargine Injectable (LANTUS) 7 Unit(s) SubCutaneous <User Schedule>  insulin lispro (ADMELOG) corrective regimen sliding scale   SubCutaneous every 6 hours  lactated ringers. 1000 milliLiter(s) (50 mL/Hr) IV Continuous <Continuous>  levothyroxine Injectable 87.5 MICROGram(s) IV Push <User Schedule>  lidocaine   4% Patch 1 Patch Transdermal at bedtime  lidocaine   4% Patch 1 Patch Transdermal at bedtime  melatonin Liquid 3 milliGRAM(s) Oral at bedtime  meropenem  IVPB 1000 milliGRAM(s) IV Intermittent every 8 hours  meropenem  IVPB      multivitamin/minerals/iron Oral Solution (CENTRUM) 15 milliLiter(s) Oral daily  pantoprazole  Injectable 40 milliGRAM(s) IV Push <User Schedule>  predniSONE  Solution 5 milliGRAM(s) Oral <User Schedule>  QUEtiapine 12.5 milliGRAM(s) Oral <User Schedule>  simethicone 80 milliGRAM(s) Chew every 12 hours  tobramycin 0.3% Ophthalmic Solution 2 Drop(s) Both EYES every 6 hours  vancomycin    Solution 125 milliGRAM(s) Oral every 6 hours    MEDICATIONS  (PRN):  acetaminophen   Oral Liquid .. 1000 milliGRAM(s) Oral every 6 hours PRN Temp greater or equal to 38C (100.4F), Mild Pain (1 - 3)  calamine/zinc oxide Lotion 1 Application(s) Topical daily PRN Rash and/or Itching  ondansetron Injectable 4 milliGRAM(s) IV Push every 8 hours PRN Nausea and/or Vomiting  petrolatum Ophthalmic Ointment 1 Application(s) Both EYES every 6 hours PRN eye dryness  sodium chloride 0.65% Nasal 1 Spray(s) Both Nostrils two times a day PRN Nasal Congestion  sodium chloride 0.65% Nasal 1 Spray(s) Both Nostrils every 12 hours PRN Congestion  traMADol 25 milliGRAM(s) Oral every 8 hours PRN Mild Pain (1 - 3)      LABS:                        7.2    4.38  )-----------( 106      ( 09 Apr 2024 01:10 )             25.7     04-09    140  |  102  |  13  ----------------------------<  155<H>  4.0   |  26  |  0.17<L>    Ca    8.7      09 Apr 2024 01:10  Phos  2.7     04-09  Mg     2.0     04-09    TPro  6.4  /  Alb  2.9<L>  /  TBili  0.2  /  DBili  x   /  AST  25  /  ALT  20  /  AlkPhos  46  04-09      Urinalysis Basic - ( 09 Apr 2024 01:10 )    Color: x / Appearance: x / SG: x / pH: x  Gluc: 155 mg/dL / Ketone: x  / Bili: x / Urobili: x   Blood: x / Protein: x / Nitrite: x   Leuk Esterase: x / RBC: x / WBC x   Sq Epi: x / Non Sq Epi: x / Bacteria: x          CAPILLARY BLOOD GLUCOSE    MICROBIOLOGY:     RADIOLOGY:  [ ] Reviewed and interpreted by me    Mode: AC/ CMV (Assist Control/ Continuous Mandatory Ventilation)  RR (machine): 12  TV (machine): 300  FiO2: 30  PEEP: 5  ITime: 1  MAP: 8  PIP: 28   Patient is a 74y old  Female who presents with a chief complaint of Dislodged G Tube (09 Apr 2024 12:32)      Interval Events: No events reported over night.     REVIEW OF SYSTEMS:  [ ] Positive  [X] All other systems negative  [ ] Unable to assess ROS because ___     Vital Signs Last 24 Hrs  T(C): 37 (04-10-24 @ 05:52), Max: 37.2 (04-09-24 @ 13:48)  T(F): 98.6 (04-10-24 @ 05:52), Max: 98.9 (04-09-24 @ 13:48)  HR: 95 (04-10-24 @ 05:52) (73 - 95)  BP: 154/69 (04-10-24 @ 05:52) (118/66 - 154/69)  RR: 18 (04-10-24 @ 05:52) (18 - 18)  SpO2: 99% (04-10-24 @ 05:52) (98% - 100%)      PHYSICAL EXAM:  HEENT:   [X] Tracheostomy:  #8 distal XLT Cuffed Shiley  [X] PERRL B/L; EOMI; eyes red L>R  [X] No oral lesions  [ ] Abnormal    SKIN  [ ] No Rash  [ ] Abnormal  [X] pressure: B/L buttocks/sacral deep tissue injury     CARDIAC  [X] Regular  [ ]Abnormal    PULMONARY  [X] Bilateral Clear Breath Sounds  [ ] Normal Excursion  [ ] Abnormal    GI  [X] PEG      [X] +BS		              [X] Soft, nondistended, nontender	  [ ] Abnormal    MUSCULOSKELETAL                                   [X] Bedbound                 [ ] Abnormal    [ ] Ambulatory/OOB to chair                           EXTREMITIES                                         [X] Normal  [ ]Edema                           NEUROLOGIC  [ ] Normal, non focal  [X] Focal findings:  Alert and Oriented x3; responds to questions by mouthing words; +gag reflex/cough; no facial asymmetry observed; moves all distal limbs upon request; B/L plantar flexion    PSYCHIATRIC  [X] Alert and appropriate  [ ] Sedated	 [ ]Agitated    :  Mcbride: [ ] Yes, if yes: Date of Placement:                   [X ] No    LINES: Central Lines [ ] Yes, if yes: Date of Placement                                     [X ] No        HOSPITAL MEDICATIONS:  MEDICATIONS  (STANDING):  albuterol/ipratropium for Nebulization 3 milliLiter(s) Nebulizer every 6 hours  amLODIPine   Tablet 10 milliGRAM(s) Oral <User Schedule>  artificial tears (preservative free) Ophthalmic Solution 2 Drop(s) Both EYES every 6 hours  ascorbic acid 500 milliGRAM(s) Oral daily  Biotene Dry Mouth Oral Rinse 5 milliLiter(s) Swish and Spit every 6 hours  chlorhexidine 0.12% Liquid 15 milliLiter(s) Oral Mucosa every 12 hours  chlorhexidine 4% Liquid 1 Application(s) Topical <User Schedule>  escitalopram 10 milliGRAM(s) Oral <User Schedule>  fentaNYL   Patch  25 MICROgram(s)/Hr 1 Patch Transdermal every 72 hours  gabapentin Solution 100 milliGRAM(s) Oral <User Schedule>  insulin glargine Injectable (LANTUS) 7 Unit(s) SubCutaneous <User Schedule>  insulin lispro (ADMELOG) corrective regimen sliding scale   SubCutaneous every 6 hours  lactated ringers. 1000 milliLiter(s) (50 mL/Hr) IV Continuous <Continuous>  levothyroxine Injectable 87.5 MICROGram(s) IV Push <User Schedule>  lidocaine   4% Patch 1 Patch Transdermal at bedtime  lidocaine   4% Patch 1 Patch Transdermal at bedtime  melatonin Liquid 3 milliGRAM(s) Oral at bedtime  meropenem  IVPB 1000 milliGRAM(s) IV Intermittent every 8 hours  meropenem  IVPB      multivitamin/minerals/iron Oral Solution (CENTRUM) 15 milliLiter(s) Oral daily  pantoprazole  Injectable 40 milliGRAM(s) IV Push <User Schedule>  predniSONE  Solution 5 milliGRAM(s) Oral <User Schedule>  QUEtiapine 12.5 milliGRAM(s) Oral <User Schedule>  simethicone 80 milliGRAM(s) Chew every 12 hours  tobramycin 0.3% Ophthalmic Solution 2 Drop(s) Both EYES every 6 hours  vancomycin    Solution 125 milliGRAM(s) Oral every 6 hours    MEDICATIONS  (PRN):  acetaminophen   Oral Liquid .. 1000 milliGRAM(s) Oral every 6 hours PRN Temp greater or equal to 38C (100.4F), Mild Pain (1 - 3)  calamine/zinc oxide Lotion 1 Application(s) Topical daily PRN Rash and/or Itching  ondansetron Injectable 4 milliGRAM(s) IV Push every 8 hours PRN Nausea and/or Vomiting  petrolatum Ophthalmic Ointment 1 Application(s) Both EYES every 6 hours PRN eye dryness  sodium chloride 0.65% Nasal 1 Spray(s) Both Nostrils two times a day PRN Nasal Congestion  sodium chloride 0.65% Nasal 1 Spray(s) Both Nostrils every 12 hours PRN Congestion  traMADol 25 milliGRAM(s) Oral every 8 hours PRN Mild Pain (1 - 3)      LABS:                        7.2    4.38  )-----------( 106      ( 09 Apr 2024 01:10 )             25.7     04-09    140  |  102  |  13  ----------------------------<  155<H>  4.0   |  26  |  0.17<L>    Ca    8.7      09 Apr 2024 01:10  Phos  2.7     04-09  Mg     2.0     04-09    TPro  6.4  /  Alb  2.9<L>  /  TBili  0.2  /  DBili  x   /  AST  25  /  ALT  20  /  AlkPhos  46  04-09      Urinalysis Basic - ( 09 Apr 2024 01:10 )    Color: x / Appearance: x / SG: x / pH: x  Gluc: 155 mg/dL / Ketone: x  / Bili: x / Urobili: x   Blood: x / Protein: x / Nitrite: x   Leuk Esterase: x / RBC: x / WBC x   Sq Epi: x / Non Sq Epi: x / Bacteria: x          CAPILLARY BLOOD GLUCOSE    MICROBIOLOGY:     RADIOLOGY:  [ ] Reviewed and interpreted by me    Mode: AC/ CMV (Assist Control/ Continuous Mandatory Ventilation)  RR (machine): 12  TV (machine): 300  FiO2: 30  PEEP: 5  ITime: 1  MAP: 8  PIP: 28

## 2024-04-10 NOTE — PROGRESS NOTE ADULT - NS ATTEND AMEND GEN_ALL_CORE FT
74 year old female with diabetes, hypertension, hyperlipidemia, hypothyroidism, RA on Prednisone, fibromyalgia, cerebral aneurysm s/p repair, chronic hypercapnic respiratory failure s/p trach (vent dependent) and PEG, recurrent C.diff infection (follows with Dr. Newman) who presented with dislodged PEG.     #neuro  awake, interactive, follows commands    #resp  full vent support  monitor for hemoptysis - no katie hemoptysis at this time, continue duonebs     #Dislodged PEG  PEG again dislodged on 4/9, emerson in place. Appreciate IR input. GI and surgery consulted     #ID  Pt febrile to 102 on 4/7. Repeat blood cultures showing serratia 4/7. Repeat blood cultures from 4/9 pending. Meropenem continued currently. Monitor for further fevers. Completed  fidaxomicin for total 10 day course, restart vanco po 125 mg qid as suppression as she is back on antibiotics.     #Heme   Hb improved to 8 without transfusion, continue to monitor

## 2024-04-10 NOTE — SWALLOW BEDSIDE ASSESSMENT ADULT - COMMENTS
Continued history:   -Per patient's son at bedside, patient currently being treated for C-diff.  Known infection for past 2-3 weeks. Also report recent UTI--not currently being treated.  -Admitted to MICU for ventilator support and treatment of dislodged PEG/abdominal wall cellulitis.   -IR Attending adjusted PEG at bedside on 4/4 and PEG remained with moderate amount of PEG leakage once feeds initiated at reduced rate.   -4/6: PEG continued to have moderate leakage with feeds going, unable to tolerate goal rate.  IR informed, planning for PEG revision on 4/8 or 4/9.  WIll continue PEG feeds at reduced rate as tolerated.   - 4/7 Temp- 102F, asymptomatic.     Speech & Swallow : KNOWN TO PRIOR ADMISSION SEE BELOW 2023  >11/30 verbal: Daughter reports pt had a FEEs at [facility] and was cleared for a diet while on a vent and had SLP at home with patient having po; mainly small sips of thin liquids and minced solids for comfort. No other information provided verbally.  >Known; Home vent settings: (300 TV/ RR 12/5 Peep/ Fio2 30%).  11/18 RR increased to 14 due to hypercarbia from trach collar trial;  Tolerates intermittent PSV 15/5 during day/ Vent HS  >Seen for AAC and PMV IN LINE, see reports for more information.

## 2024-04-10 NOTE — SWALLOW BEDSIDE ASSESSMENT ADULT - ADDITIONAL RECOMMENDATIONS
Transplant Nephrology GOAL- Pt to tolerate recommended textures during course w/ no s/sx of aspiration Pt/family/caregiver will demonstrate understanding and carryover of dysphagia management (safe swallow guidelines, dysphagia diet).

## 2024-04-10 NOTE — PROGRESS NOTE ADULT - ASSESSMENT
Gastrostomy malfunction/buried bumper. CT imaging reviewd with DR. Estrada from surgery. Will plan for attempted joint surgery/GI procedure to close current gastrostomy and place GJ tube early next week.

## 2024-04-10 NOTE — SWALLOW BEDSIDE ASSESSMENT ADULT - NS SPL SWALLOW CLINIC TRIAL FT
Offered ice chips and cold cloth to labial surface/lips with no orientation to feeding task despite max cues verbally and tactile by SLP and family/caregivers. Daughter endorsed pt was awake earlier but will sometimes have 'moments' when she does not want to participate. Rationale provided pt family and SLP for oral trials of ice chips however pt did not respond. Did not follow commands or open eyes. Daughter endorsed that pulmonologist from home indicated pt could have sips of water/thin liquids from straw and tsp in small amounts ( as MD saw pt take some at home with her) ; daughter has been giving her coffee with lemon approx 2 ounces and other items such as gelato, at times, for tastes so patient can improve quality of life.

## 2024-04-10 NOTE — PROGRESS NOTE ADULT - ASSESSMENT
73 yo F PMH T2DM on insulin, HTN, HLD, hypothyroidism, RA on Prednisone, Fibromyalgia, cerebral aneurysm s/p repair, chronic hypercapnic respiratory failure s/p trach (vent dependent) and Chronic PEG 2022 , recurrent C.diff infection (follows with Dr. Newman) , bedbound who presented from home with complaints of possible G tube dislodgement and redness around the site. Had CT Abdomen/Pelvis done showing dislodgement of G tube with balloon in the anterior abdominal wall with air filled track to stomach. Admitted to MICU for ventilator management and given vancomycin/meropenem empirically for treatment of cellulitis. Per family patient has had Known c diff infection for past 2-3 weeks.  Treating with Fidaxomicin.  IR team exchanged PEG on 4/3 however later in the day it remained with leakage.  IR Attending adjusted PEG at bedside on 4/4 and PEG remained with moderate amount of PEG leakage once feeds initiated at reduced rate.       4/9:  IR was called yesterday for an explanation/plan for procedure (for our team and for pts daughter) - we were informed someone would be bedside later in evening rounds.  This am IR called again to discuss plan for procedure scheduled for today 4/9.  Pt was still on schedule this AM, was informed that someone from IR team will come to bedside to evaluate/discuss plan/consent with daughter prior to procedure.  Daughter frustrated, case discussed between daughter/IR PA.  IR PA Lesley discussed plan with her team, case was cancelled for today, GI brought on board as per pts daughters request for second opinion.  GI - Dr. Cleaning now involved in case, plan for possible new PEG with GI.  Pt now with positive blood cultures - serratia, awaiting sensitivities - will need to hold off on new PEG until bacteremia clears.  PEG then fell out and was found in bed, Dr. Cleaning called, emerson placed in track, Dr. Cleaning to replace PEG today.  Possible temporary NGT but needs placement with xrays to confirm no extravasation at PEG site.  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  ID - Dr. Javed, continue meropenem until sensitivities result, aware daughter is concerned about the thrombocytopenia which she contributes to antibiotics - continue current therapy as per Dr. Javed.  Addressed Zinvaya with Dr. Javed (initially spoken about between Dr. Newman and Daughter), spoke with pharmacy, hospital does not carry that medication.  Continue vanco PO until meropenem course completed - ok for missed doses because of PEG complications.  Midline placed.  Daughter Marysol aware.   75 yo F PMH T2DM on insulin, HTN, HLD, hypothyroidism, RA on Prednisone, Fibromyalgia, cerebral aneurysm s/p repair, chronic hypercapnic respiratory failure s/p trach (vent dependent) and Chronic PEG 2022 , recurrent C.diff infection (follows with Dr. Newman) , bedbound who presented from home with complaints of possible G tube dislodgement and redness around the site. Had CT Abdomen/Pelvis done showing dislodgement of G tube with balloon in the anterior abdominal wall with air filled track to stomach. Admitted to MICU for ventilator management and given vancomycin/meropenem empirically for treatment of cellulitis. Per family patient has had Known c diff infection for past 2-3 weeks.  Treating with Fidaxomicin.  IR team exchanged PEG on 4/3 however later in the day it remained with leakage.  IR Attending adjusted PEG at bedside on 4/4 and PEG remained with moderate amount of PEG leakage once feeds initiated at reduced rate.       4/9:  IR was called yesterday for an explanation/plan for procedure (for our team and for pts daughter) - we were informed someone would be bedside later in evening rounds.  This am IR called again to discuss plan for procedure scheduled for today 4/9.  Pt was still on schedule this AM, was informed that someone from IR team will come to bedside to evaluate/discuss plan/consent with daughter prior to procedure.  Daughter frustrated, case discussed between daughter/IR PA.  IR PA Lesley discussed plan with her team, case was cancelled for today, GI brought on board as per pts daughters request for second opinion.  GI - Dr. Cleaning now involved in case, plan for possible new PEG with GI.  Pt now with positive blood cultures - serratia, awaiting sensitivities - will need to hold off on new PEG until bacteremia clears.  PEG then fell out and was found in bed, Dr. Cleaning called, emerson placed in track, Dr. Cleaning to replace PEG today.  Possible temporary NGT but needs placement with xrays to confirm no extravasation at PEG site.  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  ID - Dr. Javed, continue meropenem until sensitivities result, aware daughter is concerned about the thrombocytopenia which she contributes to antibiotics - continue current therapy as per Dr. Javed.  Addressed Zinvaya with Dr. Javed (initially spoken about between Dr. Newman and Daughter), spoke with pharmacy, hospital does not carry that medication.  Continue vanco PO until meropenem course completed - ok for missed doses because of PEG complications.  Midline placed.  Daughter Marysol aware.    4/10:  No acute events.  Patient remains NPO as awaiting plan in regards to PEG replacement.  Also awaiting negative blood cultures.  Remains on Meropenem.

## 2024-04-10 NOTE — PROGRESS NOTE ADULT - PROBLEM SELECTOR PLAN 2
Being treated for C diff as outpatient with follow up with Infectious disease, Dr Newman   - completed fidaxomicin (total 10 day course) 4/3 -->4/13  - ID recommends Zinvaya 50mg IV to be given to reduce Cdiff infections (is not carried in hospital)  - continue with PO vanco (started 4/8) until completion of meropenem  - Infectious diease consult appreciated

## 2024-04-10 NOTE — PROGRESS NOTE ADULT - PROBLEM SELECTOR PLAN 4
possibily reactive in setting of antibiotics/active infection  - history of AOCD  - ID aware - continue antibiotics  - continue to monitor CBC

## 2024-04-10 NOTE — SWALLOW BEDSIDE ASSESSMENT ADULT - H & P REVIEW
MAXX LOPEZ is a 73 yo F PMH T2DM on insulin, HTN, HLD, hypothyroidism, RA on Prednisone, Fibromyalgia, cerebral aneurysm s/p repair, acute hypercapnic respiratory failure s/p trach (vent dependent) and PEG (10/22), bedbound presented from home with complaints of possible G tube dislodgement and redness around the site.  CT Abdomen/Pelvis done showing dislodgement of G tube with balloon in the anterior abdominal wall with air filled track to stomach.  GI contacted, but unable to replace at bedside given placement of balloon. Surgery consulted--recommend gastrograffin study to eval placement of PEG and possible IR replacement in AM./yes
MAXX LOPEZ is a 73 yo F PMH T2DM on insulin, HTN, HLD, hypothyroidism, RA on Prednisone, Fibromyalgia, cerebral aneurysm s/p repair, acute hypercapnic respiratory failure s/p trach (vent dependent) and PEG (10/22), bedbound presented from home with complaints of possible G tube dislodgement and redness around the site.  CT Abdomen/Pelvis done showing dislodgement of G tube with balloon in the anterior abdominal wall with air filled track to stomach.  GI contacted, but unable to replace at bedside given placement of balloon. Surgery consulted--recommend gastrograffin study to eval placement of PEG and possible IR replacement in AM./yes

## 2024-04-11 DIAGNOSIS — Z71.89 OTHER SPECIFIED COUNSELING: ICD-10-CM

## 2024-04-11 LAB
-  AMPICILLIN/SULBACTAM: SIGNIFICANT CHANGE UP
-  AMPICILLIN: SIGNIFICANT CHANGE UP
-  AZTREONAM: SIGNIFICANT CHANGE UP
-  CEFAZOLIN: SIGNIFICANT CHANGE UP
-  CEFEPIME: SIGNIFICANT CHANGE UP
-  CEFOXITIN: SIGNIFICANT CHANGE UP
-  CEFTRIAXONE: SIGNIFICANT CHANGE UP
-  CIPROFLOXACIN: SIGNIFICANT CHANGE UP
-  ERTAPENEM: SIGNIFICANT CHANGE UP
-  GENTAMICIN: SIGNIFICANT CHANGE UP
-  LEVOFLOXACIN: SIGNIFICANT CHANGE UP
-  MEROPENEM: SIGNIFICANT CHANGE UP
-  PIPERACILLIN/TAZOBACTAM: SIGNIFICANT CHANGE UP
-  TOBRAMYCIN: SIGNIFICANT CHANGE UP
-  TRIMETHOPRIM/SULFAMETHOXAZOLE: SIGNIFICANT CHANGE UP
ALBUMIN SERPL ELPH-MCNC: 2.6 G/DL — LOW (ref 3.3–5)
ALP SERPL-CCNC: 43 U/L — SIGNIFICANT CHANGE UP (ref 40–120)
ALT FLD-CCNC: 17 U/L — SIGNIFICANT CHANGE UP (ref 10–45)
ANION GAP SERPL CALC-SCNC: 12 MMOL/L — SIGNIFICANT CHANGE UP (ref 5–17)
AST SERPL-CCNC: 19 U/L — SIGNIFICANT CHANGE UP (ref 10–40)
BILIRUB SERPL-MCNC: 0.7 MG/DL — SIGNIFICANT CHANGE UP (ref 0.2–1.2)
BUN SERPL-MCNC: <4 MG/DL — LOW (ref 7–23)
CALCIUM SERPL-MCNC: 8.3 MG/DL — LOW (ref 8.4–10.5)
CHLORIDE SERPL-SCNC: 100 MMOL/L — SIGNIFICANT CHANGE UP (ref 96–108)
CO2 SERPL-SCNC: 25 MMOL/L — SIGNIFICANT CHANGE UP (ref 22–31)
CREAT SERPL-MCNC: <0.3 MG/DL — LOW (ref 0.5–1.3)
CULTURE RESULTS: ABNORMAL
EGFR: 113 ML/MIN/1.73M2 — SIGNIFICANT CHANGE UP
GAS PNL BLDA: SIGNIFICANT CHANGE UP
GLUCOSE BLDC GLUCOMTR-MCNC: 138 MG/DL — HIGH (ref 70–99)
GLUCOSE BLDC GLUCOMTR-MCNC: 156 MG/DL — HIGH (ref 70–99)
GLUCOSE BLDC GLUCOMTR-MCNC: 163 MG/DL — HIGH (ref 70–99)
GLUCOSE SERPL-MCNC: 125 MG/DL — HIGH (ref 70–99)
HCT VFR BLD CALC: 30.3 % — LOW (ref 34.5–45)
HGB BLD-MCNC: 8.8 G/DL — LOW (ref 11.5–15.5)
MAGNESIUM SERPL-MCNC: 1.4 MG/DL — LOW (ref 1.6–2.6)
MAGNESIUM SERPL-MCNC: 1.7 MG/DL — SIGNIFICANT CHANGE UP (ref 1.6–2.6)
MCHC RBC-ENTMCNC: 24.6 PG — LOW (ref 27–34)
MCHC RBC-ENTMCNC: 29 GM/DL — LOW (ref 32–36)
MCV RBC AUTO: 84.6 FL — SIGNIFICANT CHANGE UP (ref 80–100)
METHOD TYPE: SIGNIFICANT CHANGE UP
NRBC # BLD: 0 /100 WBCS — SIGNIFICANT CHANGE UP (ref 0–0)
ORGANISM # SPEC MICROSCOPIC CNT: ABNORMAL
PHOSPHATE SERPL-MCNC: 2.5 MG/DL — SIGNIFICANT CHANGE UP (ref 2.5–4.5)
PHOSPHATE SERPL-MCNC: 3.1 MG/DL — SIGNIFICANT CHANGE UP (ref 2.5–4.5)
PLATELET # BLD AUTO: 120 K/UL — LOW (ref 150–400)
POTASSIUM SERPL-MCNC: 3.1 MMOL/L — LOW (ref 3.5–5.3)
POTASSIUM SERPL-SCNC: 3.1 MMOL/L — LOW (ref 3.5–5.3)
PROT SERPL-MCNC: 5.9 G/DL — LOW (ref 6–8.3)
RBC # BLD: 3.58 M/UL — LOW (ref 3.8–5.2)
RBC # FLD: 19.1 % — HIGH (ref 10.3–14.5)
SODIUM SERPL-SCNC: 137 MMOL/L — SIGNIFICANT CHANGE UP (ref 135–145)
SPECIMEN SOURCE: SIGNIFICANT CHANGE UP
WBC # BLD: 8.61 K/UL — SIGNIFICANT CHANGE UP (ref 3.8–10.5)
WBC # FLD AUTO: 8.61 K/UL — SIGNIFICANT CHANGE UP (ref 3.8–10.5)

## 2024-04-11 PROCEDURE — 99233 SBSQ HOSP IP/OBS HIGH 50: CPT

## 2024-04-11 PROCEDURE — 99233 SBSQ HOSP IP/OBS HIGH 50: CPT | Mod: FS

## 2024-04-11 RX ORDER — HYDROMORPHONE HYDROCHLORIDE 2 MG/ML
0.5 INJECTION INTRAMUSCULAR; INTRAVENOUS; SUBCUTANEOUS EVERY 4 HOURS
Refills: 0 | Status: DISCONTINUED | OUTPATIENT
Start: 2024-04-11 | End: 2024-04-18

## 2024-04-11 RX ORDER — CEFEPIME 1 G/1
1000 INJECTION, POWDER, FOR SOLUTION INTRAMUSCULAR; INTRAVENOUS ONCE
Refills: 0 | Status: COMPLETED | OUTPATIENT
Start: 2024-04-11 | End: 2024-04-11

## 2024-04-11 RX ORDER — ACETAMINOPHEN 500 MG
1000 TABLET ORAL ONCE
Refills: 0 | Status: COMPLETED | OUTPATIENT
Start: 2024-04-11 | End: 2024-04-11

## 2024-04-11 RX ORDER — SODIUM CHLORIDE 0.65 %
1 AEROSOL, SPRAY (ML) NASAL
Refills: 0 | Status: COMPLETED | OUTPATIENT
Start: 2024-04-11 | End: 2024-04-13

## 2024-04-11 RX ORDER — CEFEPIME 1 G/1
INJECTION, POWDER, FOR SOLUTION INTRAMUSCULAR; INTRAVENOUS
Refills: 0 | Status: DISCONTINUED | OUTPATIENT
Start: 2024-04-11 | End: 2024-04-14

## 2024-04-11 RX ORDER — POTASSIUM CHLORIDE 20 MEQ
10 PACKET (EA) ORAL
Refills: 0 | Status: COMPLETED | OUTPATIENT
Start: 2024-04-11 | End: 2024-04-11

## 2024-04-11 RX ORDER — MAGNESIUM SULFATE 500 MG/ML
2 VIAL (ML) INJECTION ONCE
Refills: 0 | Status: COMPLETED | OUTPATIENT
Start: 2024-04-11 | End: 2024-04-11

## 2024-04-11 RX ORDER — HYDROMORPHONE HYDROCHLORIDE 2 MG/ML
0.5 INJECTION INTRAMUSCULAR; INTRAVENOUS; SUBCUTANEOUS ONCE
Refills: 0 | Status: DISCONTINUED | OUTPATIENT
Start: 2024-04-11 | End: 2024-04-11

## 2024-04-11 RX ORDER — PANTOPRAZOLE SODIUM 20 MG/1
40 TABLET, DELAYED RELEASE ORAL EVERY 12 HOURS
Refills: 0 | Status: DISCONTINUED | OUTPATIENT
Start: 2024-04-11 | End: 2024-04-18

## 2024-04-11 RX ORDER — CEFEPIME 1 G/1
1000 INJECTION, POWDER, FOR SOLUTION INTRAMUSCULAR; INTRAVENOUS EVERY 12 HOURS
Refills: 0 | Status: DISCONTINUED | OUTPATIENT
Start: 2024-04-11 | End: 2024-04-14

## 2024-04-11 RX ORDER — HYDROMORPHONE HYDROCHLORIDE 2 MG/ML
1 INJECTION INTRAMUSCULAR; INTRAVENOUS; SUBCUTANEOUS EVERY 6 HOURS
Refills: 0 | Status: DISCONTINUED | OUTPATIENT
Start: 2024-04-11 | End: 2024-04-18

## 2024-04-11 RX ADMIN — Medication 1: at 12:04

## 2024-04-11 RX ADMIN — Medication 87.5 MICROGRAM(S): at 06:06

## 2024-04-11 RX ADMIN — PANTOPRAZOLE SODIUM 40 MILLIGRAM(S): 20 TABLET, DELAYED RELEASE ORAL at 06:06

## 2024-04-11 RX ADMIN — Medication 1 DROP(S): at 18:06

## 2024-04-11 RX ADMIN — Medication 100 MILLIEQUIVALENT(S): at 15:15

## 2024-04-11 RX ADMIN — HYDROMORPHONE HYDROCHLORIDE 1 MILLIGRAM(S): 2 INJECTION INTRAMUSCULAR; INTRAVENOUS; SUBCUTANEOUS at 23:07

## 2024-04-11 RX ADMIN — LIDOCAINE 1 PATCH: 4 CREAM TOPICAL at 03:35

## 2024-04-11 RX ADMIN — LIDOCAINE 1 PATCH: 4 CREAM TOPICAL at 22:37

## 2024-04-11 RX ADMIN — Medication 1 DROP(S): at 00:12

## 2024-04-11 RX ADMIN — Medication 1 APPLICATION(S): at 05:58

## 2024-04-11 RX ADMIN — Medication 1000 MILLIGRAM(S): at 00:45

## 2024-04-11 RX ADMIN — Medication 100 MILLIEQUIVALENT(S): at 13:19

## 2024-04-11 RX ADMIN — Medication 3 MILLILITER(S): at 23:20

## 2024-04-11 RX ADMIN — Medication 1: at 18:07

## 2024-04-11 RX ADMIN — SODIUM CHLORIDE 50 MILLILITER(S): 9 INJECTION, SOLUTION INTRAVENOUS at 22:38

## 2024-04-11 RX ADMIN — CHLORHEXIDINE GLUCONATE 15 MILLILITER(S): 213 SOLUTION TOPICAL at 05:51

## 2024-04-11 RX ADMIN — Medication 400 MILLIGRAM(S): at 00:05

## 2024-04-11 RX ADMIN — CEFEPIME 100 MILLIGRAM(S): 1 INJECTION, POWDER, FOR SOLUTION INTRAMUSCULAR; INTRAVENOUS at 18:06

## 2024-04-11 RX ADMIN — Medication 5 MILLILITER(S): at 00:05

## 2024-04-11 RX ADMIN — Medication 5 MILLILITER(S): at 05:51

## 2024-04-11 RX ADMIN — Medication 400 MILLIGRAM(S): at 17:06

## 2024-04-11 RX ADMIN — Medication 3 MILLILITER(S): at 17:18

## 2024-04-11 RX ADMIN — CHLORHEXIDINE GLUCONATE 15 MILLILITER(S): 213 SOLUTION TOPICAL at 18:06

## 2024-04-11 RX ADMIN — HYDROMORPHONE HYDROCHLORIDE 1 MILLIGRAM(S): 2 INJECTION INTRAMUSCULAR; INTRAVENOUS; SUBCUTANEOUS at 22:37

## 2024-04-11 RX ADMIN — Medication 1 APPLICATION(S): at 13:20

## 2024-04-11 RX ADMIN — Medication 3 MILLILITER(S): at 05:24

## 2024-04-11 RX ADMIN — Medication 1 DROP(S): at 12:03

## 2024-04-11 RX ADMIN — CEFEPIME 100 MILLIGRAM(S): 1 INJECTION, POWDER, FOR SOLUTION INTRAMUSCULAR; INTRAVENOUS at 09:28

## 2024-04-11 RX ADMIN — Medication 3 MILLILITER(S): at 11:09

## 2024-04-11 RX ADMIN — MEROPENEM 100 MILLIGRAM(S): 1 INJECTION INTRAVENOUS at 06:07

## 2024-04-11 RX ADMIN — Medication 100 MILLIEQUIVALENT(S): at 12:03

## 2024-04-11 RX ADMIN — INSULIN GLARGINE 7 UNIT(S): 100 INJECTION, SOLUTION SUBCUTANEOUS at 18:13

## 2024-04-11 RX ADMIN — HYDROMORPHONE HYDROCHLORIDE 0.5 MILLIGRAM(S): 2 INJECTION INTRAMUSCULAR; INTRAVENOUS; SUBCUTANEOUS at 08:44

## 2024-04-11 RX ADMIN — Medication 1 APPLICATION(S): at 22:38

## 2024-04-11 RX ADMIN — LIDOCAINE 1 PATCH: 4 CREAM TOPICAL at 22:36

## 2024-04-11 RX ADMIN — Medication 25 GRAM(S): at 10:04

## 2024-04-11 RX ADMIN — Medication 1 DROP(S): at 05:58

## 2024-04-11 RX ADMIN — Medication 1 SPRAY(S): at 18:24

## 2024-04-11 RX ADMIN — LIDOCAINE 1 PATCH: 4 CREAM TOPICAL at 07:35

## 2024-04-11 RX ADMIN — Medication 5 MILLILITER(S): at 12:03

## 2024-04-11 RX ADMIN — Medication 5 MILLILITER(S): at 18:08

## 2024-04-11 NOTE — PROGRESS NOTE ADULT - SUBJECTIVE AND OBJECTIVE BOX
Patient is a 74y old  Female who presents with a chief complaint of Dislodged G Tube (10 Apr 2024 21:15)      Interval Events:    REVIEW OF SYSTEMS:  [ ] Positive  [ ] All other systems negative  [ ] Unable to assess ROS because ________    Vital Signs Last 24 Hrs  T(C): 36.9 (04-11-24 @ 05:50), Max: 38.7 (04-10-24 @ 12:32)  T(F): 98.4 (04-11-24 @ 05:50), Max: 101.7 (04-10-24 @ 12:32)  HR: 99 (04-11-24 @ 05:50) (80 - 107)  BP: 119/59 (04-11-24 @ 05:50) (101/63 - 154/59)  RR: 18 (04-11-24 @ 05:50) (18 - 18)  SpO2: 100% (04-11-24 @ 05:50) (99% - 100%)    PHYSICAL EXAM:  HEENT:   [ ]Tracheostomy:  [ ]Pupils equal  [ ]No oral lesions  [ ]Abnormal    SKIN  [ ]No Rash  [ ] Abnormal  [ ] pressure    CARDIAC  [ ]Regular  [ ]Abnormal    PULMONARY  [ ]Bilateral Clear Breath Sounds  [ ]Normal Excursion  [ ]Abnormal    GI  [ ]PEG      [ ] +BS		              [ ]Soft, nondistended, nontender	  [ ]Abnormal    MUSCULOSKELETAL                                   [ ]Bedbound                 [ ]Abnormal    [ ]Ambulatory/OOB to chair                           EXTREMITIES                                         [ ]Normal  [ ]Edema                           NEUROLOGIC  [ ] Normal, non focal  [ ] Focal findings:    PSYCHIATRIC  [ ]Alert and appropriate  [ ] Sedated	 [ ]Agitated    :  Mcbride: [ ] Yes, if yes: Date of Placement:                   [  ] No    LINES: Central Lines [ ] Yes, if yes: Date of Placement                                     [  ] No    HOSPITAL MEDICATIONS:  MEDICATIONS  (STANDING):  albuterol/ipratropium for Nebulization 3 milliLiter(s) Nebulizer every 6 hours  amLODIPine   Tablet 10 milliGRAM(s) Oral <User Schedule>  artificial tears (preservative free) Ophthalmic Solution 1 Drop(s) Both EYES four times a day  ascorbic acid 500 milliGRAM(s) Oral daily  Biotene Dry Mouth Oral Rinse 5 milliLiter(s) Swish and Spit every 6 hours  chlorhexidine 0.12% Liquid 15 milliLiter(s) Oral Mucosa every 12 hours  chlorhexidine 4% Liquid 1 Application(s) Topical <User Schedule>  erythromycin   Ointment 1 Application(s) Both EYES three times a day  escitalopram 10 milliGRAM(s) Oral <User Schedule>  fentaNYL   Patch  25 MICROgram(s)/Hr 1 Patch Transdermal every 72 hours  gabapentin Solution 100 milliGRAM(s) Oral <User Schedule>  insulin glargine Injectable (LANTUS) 7 Unit(s) SubCutaneous <User Schedule>  insulin lispro (ADMELOG) corrective regimen sliding scale   SubCutaneous every 6 hours  lactated ringers. 1000 milliLiter(s) (50 mL/Hr) IV Continuous <Continuous>  levothyroxine Injectable 87.5 MICROGram(s) IV Push <User Schedule>  lidocaine   4% Patch 1 Patch Transdermal at bedtime  lidocaine   4% Patch 1 Patch Transdermal at bedtime  melatonin Liquid 3 milliGRAM(s) Oral at bedtime  meropenem  IVPB      meropenem  IVPB 1000 milliGRAM(s) IV Intermittent every 8 hours  multivitamin/minerals/iron Oral Solution (CENTRUM) 15 milliLiter(s) Oral daily  pantoprazole  Injectable 40 milliGRAM(s) IV Push <User Schedule>  predniSONE  Solution 5 milliGRAM(s) Oral <User Schedule>  QUEtiapine 12.5 milliGRAM(s) Oral <User Schedule>  simethicone 80 milliGRAM(s) Chew every 12 hours  vancomycin    Solution 125 milliGRAM(s) Oral every 6 hours    MEDICATIONS  (PRN):  calamine/zinc oxide Lotion 1 Application(s) Topical daily PRN Rash and/or Itching  ondansetron Injectable 4 milliGRAM(s) IV Push every 8 hours PRN Nausea and/or Vomiting  petrolatum Ophthalmic Ointment 1 Application(s) Both EYES every 6 hours PRN eye dryness  sodium chloride 0.65% Nasal 1 Spray(s) Both Nostrils two times a day PRN Nasal Congestion  sodium chloride 0.65% Nasal 1 Spray(s) Both Nostrils every 12 hours PRN Congestion  traMADol 25 milliGRAM(s) Oral every 8 hours PRN Mild Pain (1 - 3)      LABS:                        8.4    6.08  )-----------( 130      ( 10 Apr 2024 09:19 )             29.8     04-10    138  |  97  |  <4<L>  ----------------------------<  175<H>  2.8<LL>   |  26  |  <0.30<L>    Ca    8.7      10 Apr 2024 09:19  Phos  2.7     04-10  Mg     1.6     04-10    TPro  6.9  /  Alb  3.2<L>  /  TBili  0.6  /  DBili  x   /  AST  22  /  ALT  19  /  AlkPhos  51  04-10      Urinalysis Basic - ( 10 Apr 2024 09:19 )    Color: x / Appearance: x / SG: x / pH: x  Gluc: 175 mg/dL / Ketone: x  / Bili: x / Urobili: x   Blood: x / Protein: x / Nitrite: x   Leuk Esterase: x / RBC: x / WBC x   Sq Epi: x / Non Sq Epi: x / Bacteria: x          CAPILLARY BLOOD GLUCOSE    MICROBIOLOGY:     RADIOLOGY:  [ ] Reviewed and interpreted by me    Mode: AC/ CMV (Assist Control/ Continuous Mandatory Ventilation)  RR (machine): 12  TV (machine): 300  FiO2: 30  PEEP: 5  ITime: 1  MAP: 7  PIP: 27   Patient is a 74y old  Female who presents with a chief complaint of Dislodged G Tube (10 Apr 2024 21:15)      Interval Events: No events reported over night.     REVIEW OF SYSTEMS:  [X] Positive:  Burning stomach pain, "can't breathe"  [ ] All other systems negative  [ ] Unable to assess ROS because ________    Vital Signs Last 24 Hrs  T(C): 36.9 (04-11-24 @ 05:50), Max: 38.7 (04-10-24 @ 12:32)  T(F): 98.4 (04-11-24 @ 05:50), Max: 101.7 (04-10-24 @ 12:32)  HR: 99 (04-11-24 @ 05:50) (80 - 107)  BP: 119/59 (04-11-24 @ 05:50) (101/63 - 154/59)  RR: 18 (04-11-24 @ 05:50) (18 - 18)  SpO2: 100% (04-11-24 @ 05:50) (99% - 100%)    PHYSICAL EXAM:  HEENT:   [X] Tracheostomy:  #8 distal XLT Cuffed Shiley  [X] PERRL B/L; EOMI; eyes red L>R  [X] No oral lesions  [ ] Abnormal    SKIN  [ ] No Rash  [ ] Abnormal  [X] pressure: B/L buttocks/sacral deep tissue injury     CARDIAC  [X] Regular  [ ]Abnormal    PULMONARY  [X] Bilateral Clear Breath Sounds  [ ] Normal Excursion  [ ] Abnormal    GI  [X] PEG      [X] +BS		              [X] Soft, nondistended, nontender	  [ ] Abnormal    MUSCULOSKELETAL                                   [X] Bedbound                 [ ] Abnormal    [ ] Ambulatory/OOB to chair                           EXTREMITIES                                         [X] Normal  [ ]Edema                           NEUROLOGIC  [ ] Normal, non focal  [X] Focal findings:  Alert and Oriented x3; responds to questions by mouthing words; +gag reflex/cough; no facial asymmetry observed; moves all distal limbs upon request; B/L plantar flexion    PSYCHIATRIC  [X] Lethargic but arousable, affect appropriate  [ ] Sedated	 [ ]Agitated    :  Mcbride: [ ] Yes, if yes: Date of Placement:                   [X ] No    LINES: Central Lines [ ] Yes, if yes: Date of Placement                                     [X ] No    HOSPITAL MEDICATIONS:  MEDICATIONS  (STANDING):  albuterol/ipratropium for Nebulization 3 milliLiter(s) Nebulizer every 6 hours  amLODIPine   Tablet 10 milliGRAM(s) Oral <User Schedule>  artificial tears (preservative free) Ophthalmic Solution 1 Drop(s) Both EYES four times a day  ascorbic acid 500 milliGRAM(s) Oral daily  Biotene Dry Mouth Oral Rinse 5 milliLiter(s) Swish and Spit every 6 hours  chlorhexidine 0.12% Liquid 15 milliLiter(s) Oral Mucosa every 12 hours  chlorhexidine 4% Liquid 1 Application(s) Topical <User Schedule>  erythromycin   Ointment 1 Application(s) Both EYES three times a day  escitalopram 10 milliGRAM(s) Oral <User Schedule>  fentaNYL   Patch  25 MICROgram(s)/Hr 1 Patch Transdermal every 72 hours  gabapentin Solution 100 milliGRAM(s) Oral <User Schedule>  insulin glargine Injectable (LANTUS) 7 Unit(s) SubCutaneous <User Schedule>  insulin lispro (ADMELOG) corrective regimen sliding scale   SubCutaneous every 6 hours  lactated ringers. 1000 milliLiter(s) (50 mL/Hr) IV Continuous <Continuous>  levothyroxine Injectable 87.5 MICROGram(s) IV Push <User Schedule>  lidocaine   4% Patch 1 Patch Transdermal at bedtime  lidocaine   4% Patch 1 Patch Transdermal at bedtime  melatonin Liquid 3 milliGRAM(s) Oral at bedtime  meropenem  IVPB      meropenem  IVPB 1000 milliGRAM(s) IV Intermittent every 8 hours  multivitamin/minerals/iron Oral Solution (CENTRUM) 15 milliLiter(s) Oral daily  pantoprazole  Injectable 40 milliGRAM(s) IV Push <User Schedule>  predniSONE  Solution 5 milliGRAM(s) Oral <User Schedule>  QUEtiapine 12.5 milliGRAM(s) Oral <User Schedule>  simethicone 80 milliGRAM(s) Chew every 12 hours  vancomycin    Solution 125 milliGRAM(s) Oral every 6 hours    MEDICATIONS  (PRN):  calamine/zinc oxide Lotion 1 Application(s) Topical daily PRN Rash and/or Itching  ondansetron Injectable 4 milliGRAM(s) IV Push every 8 hours PRN Nausea and/or Vomiting  petrolatum Ophthalmic Ointment 1 Application(s) Both EYES every 6 hours PRN eye dryness  sodium chloride 0.65% Nasal 1 Spray(s) Both Nostrils two times a day PRN Nasal Congestion  sodium chloride 0.65% Nasal 1 Spray(s) Both Nostrils every 12 hours PRN Congestion  traMADol 25 milliGRAM(s) Oral every 8 hours PRN Mild Pain (1 - 3)      LABS:                                   8.8    8.61  )-----------( 120      ( 11 Apr 2024 16:06 )             30.3   04-11    137  |  100  |  <4<L>  ----------------------------<  125<H>  3.1<L>   |  25  |  <0.30<L>    Ca    8.3<L>      11 Apr 2024 07:15  Phos  2.5     04-11  Mg     1.7     04-11    TPro  5.9<L>  /  Alb  2.6<L>  /  TBili  0.7  /  DBili  x   /  AST  19  /  ALT  17  /  AlkPhos  43  04-11            CAPILLARY BLOOD GLUCOSE    MICROBIOLOGY:     RADIOLOGY:  [ ] Reviewed and interpreted by me    Mode: AC/ CMV (Assist Control/ Continuous Mandatory Ventilation)  RR (machine): 12  TV (machine): 300  FiO2: 30  PEEP: 5  ITime: 1  MAP: 7  PIP: 27

## 2024-04-11 NOTE — PATIENT PROFILE ADULT - FALL HARM RISK - RISK INTERVENTIONS
Assistance OOB with selected safe patient handling equipment/Assistance with ambulation/Communicate Fall Risk and Risk Factors to all staff, patient, and family/Reinforce activity limits and safety measures with patient and family/Visual Cue: Yellow wristband/Bed in lowest position, wheels locked, appropriate side rails in place/Call bell, personal items and telephone in reach/Instruct patient to call for assistance before getting out of bed or chair/Non-slip footwear when patient is out of bed/[place] to call system/Physically safe environment - no spills, clutter or unnecessary equipment/Purposeful Proactive Rounding/Room/bathroom lighting operational, light cord in reach

## 2024-04-11 NOTE — PROGRESS NOTE ADULT - PROBLEM SELECTOR PLAN 2
Being treated for C diff as outpatient with follow up with Infectious disease, Dr Newman   - completed fidaxomicin (total 10 day course) 4/3 -->4/13  - ID recommends Zinvaya 50mg IV to be given to reduce Cdiff infections (is not carried in hospital)  - continue with PO vanco (started 4/8) until completion of meropenem  - Infectious diease consult appreciated Being treated for C diff as outpatient with follow up with Infectious disease, Dr Newman   - completed fidaxomicin (total 10 day course) 4/3 -->4/13  - ID recommends ZinPlava 50mg IV to be given to reduce Cdiff infections (is not carried in hospital)  - continue with PO vanco (started 4/8) until completion of Cefepime  - Infectious diease consult appreciated

## 2024-04-11 NOTE — PROGRESS NOTE ADULT - PROBLEM SELECTOR PLAN 5
Chronic Hypoxemic/Hypercapnic Respiratory Failure  Chronic Trach/Vent dependant 2/2 Hypercapnic Resp Failure since 10/2022   - S/p Trach # 8 Distal XLT Shiley Cuffed   - Home Vent: volume /12/30%/+5  - Trach Care and suction PRN Chronic Hypoxemic/Hypercapnic Respiratory Failure  Chronic Trach/Vent dependant 2/2 Hypercapnic Resp Failure since 10/2022   - S/p Trach # 8 Distal XLT Shiley Cuffed   - Home Vent: volume /12/30%/+5   Settings changed on 4/11 to 350/12/30%/+5 as she complained of dyspnea.  - PS trials as tolerated.  - Chest PT, duonebs PRN

## 2024-04-11 NOTE — PATIENT PROFILE ADULT - INFLUENZA IMMUNIZATION DATE (APPROXIMATE)
Please let him know that his ct scan is stable, will repeat in one year  Incidental finding of gallstones, can discuss with pcp if having any gi issues.  I 17-Jan-2024

## 2024-04-11 NOTE — PROGRESS NOTE ADULT - NS ATTEND AMEND GEN_ALL_CORE FT
74 year old female with diabetes, hypertension, hyperlipidemia, hypothyroidism, RA on Prednisone, fibromyalgia, cerebral aneurysm s/p repair, chronic hypercapnic respiratory failure s/p trach (vent dependent) and PEG, recurrent C.diff infection (follows with Dr. Newman) who presented with dislodged PEG.     #neuro  awake, interactive, follows commands    #resp  full vent support  monitor for hemoptysis - no katie hemoptysis at this time, continue duonebs   on VC/AC - increased tidal volume, pt complaining of some dyspnea despite full support   check ABG     #Dislodged PEG  PEG again dislodged on 4/9, emerson in place. Appreciate IR input. GI and surgery consulted. Plan for joint intervention with GI and Surgery - closure and GJ tube placement    #ID  Pt febrile to 102 on 4/7. Repeat blood cultures showing serratia 4/7. Repeat blood cultures from 4/9 pending. Switch to cefepime per ID recommendations. Monitor for further fevers. Completed  fidaxomicin for total 10 day course, restart vanco po 125 mg qid as suppression as she is back on antibiotics.     #Renal   stable Cr  supplementing K, Mg    #ophthalmology   conjunctival erythema - no suspicion for endophthalmitis, start erythromycin gtt, artificial tears, they will monitor

## 2024-04-11 NOTE — PHYSICAL THERAPY INITIAL EVALUATION ADULT - NSPTDISCHREC_GEN_A_CORE
Pt does not meet requirements for skilled PT, family/aides can be taught PROM , rolling and off-loading sacrum in lying techniques/No skilled PT needs

## 2024-04-11 NOTE — PROGRESS NOTE ADULT - ASSESSMENT
73 yo F PMH T2DM on insulin, HTN, HLD, hypothyroidism, RA on Prednisone, Fibromyalgia, cerebral aneurysm s/p repair, chronic hypercapnic respiratory failure s/p trach (vent dependent) and Chronic PEG 2022 , recurrent C.diff infection (follows with Dr. Newman) , bedbound who presented from home with complaints of possible G tube dislodgement and redness around the site. Had CT Abdomen/Pelvis done showing dislodgement of G tube with balloon in the anterior abdominal wall with air filled track to stomach. Admitted to MICU for ventilator management and given vancomycin/meropenem empirically for treatment of cellulitis. Per family patient has had Known c diff infection for past 2-3 weeks.  Treating with Fidaxomicin.  IR team exchanged PEG on 4/3 however later in the day it remained with leakage.  IR Attending adjusted PEG at bedside on 4/4 and PEG remained with moderate amount of PEG leakage once feeds initiated at reduced rate.       4/9:  IR was called yesterday for an explanation/plan for procedure (for our team and for pts daughter) - we were informed someone would be bedside later in evening rounds.  This am IR called again to discuss plan for procedure scheduled for today 4/9.  Pt was still on schedule this AM, was informed that someone from IR team will come to bedside to evaluate/discuss plan/consent with daughter prior to procedure.  Daughter frustrated, case discussed between daughter/IR PA.  IR PA Lesley discussed plan with her team, case was cancelled for today, GI brought on board as per pts daughters request for second opinion.  GI - Dr. Cleaning now involved in case, plan for possible new PEG with GI.  Pt now with positive blood cultures - serratia, awaiting sensitivities - will need to hold off on new PEG until bacteremia clears.  PEG then fell out and was found in bed, Dr. Cleaning called, emerson placed in track, Dr. Cleaning to replace PEG today.  Possible temporary NGT but needs placement with xrays to confirm no extravasation at PEG site.  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  ID - Dr. Javed, continue meropenem until sensitivities result, aware daughter is concerned about the thrombocytopenia which she contributes to antibiotics - continue current therapy as per Dr. Javed.  Addressed Zinvaya with Dr. Javed (initially spoken about between Dr. Newman and Daughter), spoke with pharmacy, hospital does not carry that medication.  Continue vanco PO until meropenem course completed - ok for missed doses because of PEG complications.  Midline placed.  Daughter Marysol aware.    4/10:  No acute events.  Patient remains NPO as awaiting plan in regards to PEG replacement.  Also awaiting negative blood cultures.  Remains on Meropenem. 75 yo F PMH T2DM on insulin, HTN, HLD, hypothyroidism, RA on Prednisone, Fibromyalgia, cerebral aneurysm s/p repair, chronic hypercapnic respiratory failure s/p trach (vent dependent) and Chronic PEG 2022 , recurrent C.diff infection (follows with Dr. Newman) , bedbound who presented from home with complaints of possible G tube dislodgement and redness around the site. Had CT Abdomen/Pelvis done showing dislodgement of G tube with balloon in the anterior abdominal wall with air filled track to stomach. Admitted to MICU for ventilator management and given vancomycin/meropenem empirically for treatment of cellulitis. Per family patient has had Known c diff infection for past 2-3 weeks.  Treating with Fidaxomicin.  IR team exchanged PEG on 4/3 however later in the day it remained with leakage.  IR Attending adjusted PEG at bedside on 4/4 and PEG remained with moderate amount of PEG leakage once feeds initiated at reduced rate.       4/9:  IR was called yesterday for an explanation/plan for procedure (for our team and for pts daughter) - we were informed someone would be bedside later in evening rounds.  This am IR called again to discuss plan for procedure scheduled for today 4/9.  Pt was still on schedule this AM, was informed that someone from IR team will come to bedside to evaluate/discuss plan/consent with daughter prior to procedure.  Daughter frustrated, case discussed between daughter/IR PA.  IR PA Lesley discussed plan with her team, case was cancelled for today, GI brought on board as per pts daughters request for second opinion.  GI - Dr. Cleaning now involved in case, plan for possible new PEG with GI.  Pt now with positive blood cultures - serratia, awaiting sensitivities - will need to hold off on new PEG until bacteremia clears.  PEG then fell out and was found in bed, Dr. Cleaning called, emerson placed in track, Dr. Cleaning to replace PEG today.  Possible temporary NGT but needs placement with xrays to confirm no extravasation at PEG site.  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  ID - Dr. Javed, continue meropenem until sensitivities result, aware daughter is concerned about the thrombocytopenia which she contributes to antibiotics - continue current therapy as per Dr. Javed.  Addressed Zinvaya with Dr. Javed (initially spoken about between Dr. Newman and Daughter), spoke with pharmacy, hospital does not carry that medication.  Continue vanco PO until meropenem course completed - ok for missed doses because of PEG complications.  Midline placed.  Daughter Marysol aware.      4/10:  No acute events.  Patient remains NPO as awaiting plan in regards to PEG replacement.  Also awaiting negative blood cultures.  Remains on Meropenem.  4/11:  Antibiotics switched to Cefepime as per ID.  Patient had fever of 102.7F which prevented patient from having PEG revision today.  Patient complained of feeling like  "I can't breathe", tidal volume increased from 300 to 350 with resolution of symptoms.  ABG (venous specimen) collected which was appropriate to continue current ventilator settings.  Patient also complaining of burning pain in stomach, likely gastritis/GERD, increased protonix to BID.

## 2024-04-11 NOTE — PROGRESS NOTE ADULT - ASSESSMENT
75yo woman  h/o T2DM (4/4/24: A1C = 6.2%), HTN, HLD, anemia, hypothyroidism, RA, fibromyalgia, remote cerebral aneurysm repair, acute hypercapnic respiratory failure with tracheostomy, PEG tube (initially placed 2022), and bedbound, recurrent C diff presented for PEG tube dislodgment. Admitted 4/2/24  weight = 54.5 kg    Recurrent C diff - first episode Aug 2023 treated with tapering PO vanco, recurrent episode 1/31/24 treated with PO vanco and then fidaxomicin completed in Feb - most recently tested positive 3/20/24 seen by Dr. Newman 3/29 outpatient, started on fidaxomicin that day - tube feeds concurrently held, unclear if improving afterwards per family  Chronic sacral decubitus wound  3/20 Klebisella bacteruria R only to amp and sputum few Pseudomonas R to FQs w/o polys on gram stian - treatment of urine was deferred to avoid exacerbating C diff    Tmax 100, no leukocytosis  Concern for cellulitis around PEG tube site - area with very minimal erythema, remarkable receded from skin paula placed on admission  UA 11 WBC  CT a/p: no infectious focus or colitis  MRSA PCR+    4/3 IR - PEG exchanged bedside to 20 Fr [employer]  - Bedside XR demonstrates appropriately positioned G-tube with contrast filling into the stomach and bowel.    4/2 Blood Cultures x 2 NGTD;  Positive MRSA/MSSA PCR  4/3 Urine cultures NEG  4/4 fever  4/4 Wound care assessment appreciated:   " area of persistent nonblanchable dark discoloration that is inconsistent with a patient's surrounding skin tone as well as a white juan j film noted over B/L buttocks/sacral skin, area measures approximately 10cm x 10cm x 0cm- presentation is consistent with a deep tissue injury in evolution with incontinence and fungal involvement present on admission. Within the apex of the gluteal cleft/base of sacrum there is full thickness skin loss with red, beefy tissue noted, area measures approximately 3cm x 1cm x 0.3cm- presentation is consistent with a deep tissue injury in evolution with incontinence involvement present on admission."    Antibiotics  Meropenem 4/2 -->4/3  IV Vanco 4/2  Fdaxomicin 4/3-->    PEG site no obvious cellulitis - probably more irritation from displacement/leaked contents, no indication for antimicrobials.         Suggest:  completed  fidaxomicin for total 10 day course for recurrent C diff    po vanco on hold as no functioning PEG     we do not need to treat the S. Multiphila in sputum at present    Source of serratia unclear : decubitus but more likely related  to PEG manipulations   discussed with GI and team   for replacement of PEG : cleared as of now from ID view   Discussed with team and daughter in detail last pm and this am     dc meropenem ( colonized with carb r strains .and no resistance markers on the serratia )  complete ab with cefepime until 4/13 pm   no therapy for sputum isolates unless signs of pna develop.  restart po vanco when tube in place

## 2024-04-11 NOTE — PROGRESS NOTE ADULT - PROBLEM SELECTOR PLAN 3
intermittently persistent high fevers  - IV Vanco/meropenem x1 on admission for concern for cellulitis  - 4/2 blood cultures negative x2, MRSA/MSSA PCR +  - 4/3 urine culture negative, CT A/O - no infectious focus or colitis  - 4/5 Procal 6.6 increased from 0.08 on 4/3, lactate 4.3  - 4/7 fever, meropenem restarted - no leukocytosis - RVP neg, UA neg, dopplers neg  - 4/7 blood cultures positive for serratia  - continue with meropenem pending sensitivities  - ID recs appreciated intermittently persistent high fevers  - IV Vanco/meropenem x1 on admission for concern for cellulitis  - 4/2 blood cultures negative x2, MRSA/MSSA PCR +  - 4/3 urine culture negative, CT A/O - no infectious focus or colitis  - 4/5 Procal 6.6 increased from 0.08 on 4/3, lactate 4.3  - 4/7 fever, meropenem restarted - no leukocytosis - RVP neg, UA neg, dopplers neg  - 4/7 blood cultures positive for serratia.  Meropenem 4/7 -->4/11,  Cefepime 4/11 --> 4/13  - ID recs appreciated

## 2024-04-11 NOTE — PHYSICAL THERAPY INITIAL EVALUATION ADULT - GENERAL OBSERVATIONS, REHAB EVAL
Pt received semi-supine in bed in NAD, +IV, +vent, +female external catheter, +cardiac monitoring, +B/L pain patches on knees, +Contact precautions for C.Diff.  Pt very lethargic, able to open eyes when asked, able to shake head yes/no to answer questions, pt able to mouth that she was "hot" and wanted no blankets. Aide at bedside.

## 2024-04-11 NOTE — PROGRESS NOTE ADULT - PROBLEM SELECTOR PLAN 7
- As discussed with patient's daughter, Marysol, upon admission, she is interested in patient having pleasure feeds by mouth.  Daughter states patient was cleared by outpatient Pulmonologist to have small amounts of liquids orally.  Daughter informed that patient will need swallow evaluation while inpatient to assess aspiration risk.  Explained that a swallow evaluation would further inform her of appropriate texture to decrease risk of aspiration if patient is appropriate.  However if patient fails evaluation and is not cleared for an oral diet, daughter would be assuming risks of aspiration complications if she chooses to feed patient by mouth.  Once patient is more stable the plan is to proceed with FEESST.

## 2024-04-11 NOTE — PHYSICAL THERAPY INITIAL EVALUATION ADULT - ACTIVE RANGE OF MOTION EXAMINATION, REHAB EVAL
b/L UE no use of arms, can squeeze fingers slightly. B/L LE AAROM not functional/deficits as listed below

## 2024-04-11 NOTE — PHYSICAL THERAPY INITIAL EVALUATION ADULT - DIAGNOSIS, PT EVAL
Pt does not require skilled PT services at this time, however, pt would benefit from daily PROM from family or private aide.

## 2024-04-11 NOTE — PHYSICAL THERAPY INITIAL EVALUATION ADULT - PERTINENT HX OF CURRENT PROBLEM, REHAB EVAL
75 yo F PMH T2DM on insulin, HTN, HLD, hypothyroidism, RA on Prednisone, Fibromyalgia, cerebral aneurysm s/p repair, acute hypercapnic respiratory failure s/p trach (vent dependent) and PEG (10/22), bedbound presented from home with complaints of possible G tube dislodgement and redness around the site.  Site is red with small amount of pus at insertion site.  Tender to palpation, warm to touch. CT Abdomen/Pelvis done showing dislodgement of G tube with balloon in the anterior abdominal wall with air filled track to stomach.  GI contacted, but unable to replace at bedside given placement of balloon. Surgery consulted--recommend gastrograffin study to eval placement of PEG and possible IR replacement in AM. Given Vancomycin 1 gm IV x 1 due to concern for possible cellulitis.  Of note, patient hemodynamically stable, afebrile, without leukocytosis on presentation.  Known infection for past 2-3 weeks.  Treated by Dr. Zion Newman, currently receiving Fidaxomicin.  Also report recent UTI--not currently being treated. Admitted 4/2/24. Recurrent C diff - first episode Aug 2023 treated with tapering PO vanco, recurrent episode 1/31/24 treated with PO vanco and then fidaxomicin completed in Feb - most recently tested positive 3/20/24 seen by Dr. Newman 3/29 outpatient, started on fidaxomicin that day - tube feeds concurrently held, unclear if improving afterwards per family. Chronic sacral decubitus wound. 3/20 Klebisella bacteruria R only to amp and sputum few Pseudomonas R to FQs w/o polys on gram stian - treatment of urine was deferred to avoid exacerbating C diff. no leukocytosis. Concern for cellulitis around PEG tube site - area with very minimal erythema, remarkable receded from skin paula placed on admission. CT a/p: no infectious focus or colitis. MRSA PCR+

## 2024-04-11 NOTE — PROGRESS NOTE ADULT - PROBLEM SELECTOR PLAN 4
possibily reactive in setting of antibiotics/active infection  - history of AOCD  - ID aware - continue antibiotics  - continue to monitor CBC - possibly reactive in setting of antibiotics/active infection  - history of AOCD  - ID aware - continue antibiotics  - continue to monitor CBC

## 2024-04-11 NOTE — PHYSICAL THERAPY INITIAL EVALUATION ADULT - ADDITIONAL COMMENTS
Pt lives with daughter. Pt is vent dependent, and bed bound. Pt has no use of B/L UE except slight squeeze of fingers. Pt with AROM/AAROM of b/l LE in supine although not functional.

## 2024-04-11 NOTE — PROGRESS NOTE ADULT - SUBJECTIVE AND OBJECTIVE BOX
infectious diseases progress note:    Patient is a 74y old  Female who presents with a chief complaint of Dislodged G Tube (11 Apr 2024 07:35)        Cellulitis of abdominal wall          ROS:  CONSTITUTIONAL:  Negative fever or chills, feels well, good appetite  EYES:  Negative  blurry vision or double vision  CARDIOVASCULAR:  Negative for chest pain or palpitations  RESPIRATORY:  Negative for cough, wheezing, or SOB   GASTROINTESTINAL:  Negative for nausea, vomiting, diarrhea, constipation, or abdominal pain  GENITOURINARY:  Negative frequency, urgency or dysuria  NEUROLOGIC:  No headache, confusion, dizziness, lightheadedness    Allergies    pineapple (Unknown)  Tagamet (Unknown)  heparin (Unknown)  walnut (Unknown)  metronidazole (Rash)  penicillin (Unknown)  Lyrica (Unknown)  Pecan, Filbert, Hazelnut (Unknown)    Intolerances        ANTIBIOTICS/RELEVANT:  antimicrobials  meropenem  IVPB 1000 milliGRAM(s) IV Intermittent every 8 hours  meropenem  IVPB      vancomycin    Solution 125 milliGRAM(s) Oral every 6 hours    immunologic:    OTHER:  albuterol/ipratropium for Nebulization 3 milliLiter(s) Nebulizer every 6 hours  amLODIPine   Tablet 10 milliGRAM(s) Oral <User Schedule>  artificial tears (preservative free) Ophthalmic Solution 1 Drop(s) Both EYES four times a day  ascorbic acid 500 milliGRAM(s) Oral daily  Biotene Dry Mouth Oral Rinse 5 milliLiter(s) Swish and Spit every 6 hours  calamine/zinc oxide Lotion 1 Application(s) Topical daily PRN  chlorhexidine 0.12% Liquid 15 milliLiter(s) Oral Mucosa every 12 hours  chlorhexidine 4% Liquid 1 Application(s) Topical <User Schedule>  erythromycin   Ointment 1 Application(s) Both EYES three times a day  escitalopram 10 milliGRAM(s) Oral <User Schedule>  fentaNYL   Patch  25 MICROgram(s)/Hr 1 Patch Transdermal every 72 hours  gabapentin Solution 100 milliGRAM(s) Oral <User Schedule>  HYDROmorphone  Injectable 0.5 milliGRAM(s) IV Push once  insulin glargine Injectable (LANTUS) 7 Unit(s) SubCutaneous <User Schedule>  insulin lispro (ADMELOG) corrective regimen sliding scale   SubCutaneous every 6 hours  lactated ringers. 1000 milliLiter(s) IV Continuous <Continuous>  levothyroxine Injectable 87.5 MICROGram(s) IV Push <User Schedule>  lidocaine   4% Patch 1 Patch Transdermal at bedtime  lidocaine   4% Patch 1 Patch Transdermal at bedtime  melatonin Liquid 3 milliGRAM(s) Oral at bedtime  multivitamin/minerals/iron Oral Solution (CENTRUM) 15 milliLiter(s) Oral daily  ondansetron Injectable 4 milliGRAM(s) IV Push every 8 hours PRN  pantoprazole  Injectable 40 milliGRAM(s) IV Push <User Schedule>  petrolatum Ophthalmic Ointment 1 Application(s) Both EYES every 6 hours PRN  predniSONE  Solution 5 milliGRAM(s) Oral <User Schedule>  QUEtiapine 12.5 milliGRAM(s) Oral <User Schedule>  simethicone 80 milliGRAM(s) Chew every 12 hours  sodium chloride 0.65% Nasal 1 Spray(s) Both Nostrils two times a day PRN  sodium chloride 0.65% Nasal 1 Spray(s) Both Nostrils every 12 hours PRN  traMADol 25 milliGRAM(s) Oral every 8 hours PRN      Objective:  Vital Signs Last 24 Hrs  T(C): 36.9 (11 Apr 2024 05:50), Max: 38.7 (10 Apr 2024 12:32)  T(F): 98.4 (11 Apr 2024 05:50), Max: 101.7 (10 Apr 2024 12:32)  HR: 99 (11 Apr 2024 05:50) (80 - 107)  BP: 119/59 (11 Apr 2024 05:50) (101/63 - 154/59)  BP(mean): --  RR: 18 (11 Apr 2024 05:50) (18 - 18)  SpO2: 100% (11 Apr 2024 05:50) (99% - 100%)    Parameters below as of 11 Apr 2024 05:50  Patient On (Oxygen Delivery Method): ventilator        PHYSICAL EXAM:  Constitutional:Well-developed, well nourished--no acute distress  Eyes:NUBIA, EOMI  Ear/Nose/Throat: no oral lesion, no sinus tenderness on percussion	  Neck:no JVD, no lymphadenopathy, supple  Respiratory: CTA ruth  Cardiovascular: S1S2 RRR, no murmurs  Gastrointestinal:soft, (+) BS, no HSM  Extremities:no e/e/c        LABS:                        8.4    6.08  )-----------( 130      ( 10 Apr 2024 09:19 )             29.8     04-11    137  |  100  |  <4<L>  ----------------------------<  125<H>  3.1<L>   |  25  |  <0.30<L>    Ca    8.3<L>      11 Apr 2024 07:15  Phos  3.1     04-11  Mg     1.4     04-11    TPro  5.9<L>  /  Alb  2.6<L>  /  TBili  0.7  /  DBili  x   /  AST  19  /  ALT  17  /  AlkPhos  43  04-11      Urinalysis Basic - ( 11 Apr 2024 07:15 )    Color: x / Appearance: x / SG: x / pH: x  Gluc: 125 mg/dL / Ketone: x  / Bili: x / Urobili: x   Blood: x / Protein: x / Nitrite: x   Leuk Esterase: x / RBC: x / WBC x   Sq Epi: x / Non Sq Epi: x / Bacteria: x          MICROBIOLOGY:    RECENT CULTURES:  04-09 @ 18:55 .Blood Blood-Peripheral                No growth at 24 hours    04-07 @ 14:31 .Sputum Sputum   LENIN    Numerous polymorphonuclear leukocytes per low power field  Few Squamous epithelial cells per low power field  Numerous Gram Negative Rods per oil power field  Few Gram Positive Rods per oil power field    Stenotrophomonas maltophilia  Pseudomonas aeruginosa (Carbapenem Resistant)  Stenotrophomonas maltophilia     Mixed gram negative rods Culture includes  Moderate Stenotrophomonas maltophilia  Few Pseudomonas aeruginosa (Carbapenem Resistant)    04-07 @ 07:21 .Blood Blood-Peripheral   PCR    Growth in anaerobic bottle: Gram Variable Rods    Blood Culture PCR  Blood Culture PCR     No growth at 72 Hours    04-05 @ 06:43 .Sputum Sputum   LENIN    Few polymorphonuclear leukocytes per low power field  Few Squamous epithelial cells per low power field  Numerous Gram Negative Rods seen per oil power field  Moderate Gram Positive Rods seen per oil power field    Pseudomonas aeruginosa  Pseudomonas aeruginosa     Numerous Pseudomonas aeruginosa  Normal Respiratory Shanda present    04-04 @ 21:00 .Blood Blood-Peripheral                No growth at 5 days          RESPIRATORY CULTURES:              RADIOLOGY & ADDITIONAL STUDIES:        Pager [phone]  After 5 pm/weekends or if no response :[phone]

## 2024-04-11 NOTE — PROGRESS NOTE ADULT - ASSESSMENT
My Asthma Action Plan    Name: Radha Han   YOB: 1959  Date: 1/23/2023   My doctor: MOSHE Flores CNP   My clinic: [facility]        My Control Medicine: Fluticasone propionate (Flovent HFA) - 110 mcg twice a day  My Rescue Medicine: Albuterol (Proair/Ventolin/Proventil HFA) 2-4 puffs EVERY 4 HOURS as needed. Use a spacer if recommended by your provider.   My Asthma Severity:   Moderate Persistent  Know your asthma triggers: dust mites and pollens  dust mites  pollens            GREEN ZONE   Good Control    I feel good    No cough or wheeze    Can work, sleep and play without asthma symptoms       Take your asthma control medicine every day.     1. If exercise triggers your asthma, take your rescue medication    15 minutes before exercise or sports, and    During exercise if you have asthma symptoms  2. Spacer to use with inhaler: If you have a spacer, make sure to use it with your inhaler             YELLOW ZONE Getting Worse  I have ANY of these:    I do not feel good    Cough or wheeze    Chest feels tight    Wake up at night   1. Keep taking your Green Zone medications  2. Start taking your rescue medicine:    every 20 minutes for up to 1 hour. Then every 4 hours for 24-48 hours.  3. If you stay in the Yellow Zone for more than 12-24 hours, contact your doctor.  4. If you do not return to the Green Zone in 12-24 hours or you get worse, start taking your oral steroid medicine if prescribed by your provider.           RED ZONE Medical Alert - Get Help  I have ANY of these:    I feel awful    Medicine is not helping    Breathing getting harder    Trouble walking or talking    Nose opens wide to breathe       1. Take your rescue medicine NOW  2. If your provider has prescribed an oral steroid medicine, start taking it NOW  3. Call your doctor NOW  4. If you are still in the Red Zone after 20 minutes and you have not reached your doctor:    Take your rescue  medicine again and    Call 911 or go to the emergency room right away    See your regular doctor within 2 weeks of an Emergency Room or Urgent Care visit for follow-up treatment.          Annual Reminders:  Meet with Asthma Educator,  Flu Shot in the Fall, consider Pneumonia Vaccination for patients with asthma (aged 19 and older).    Pharmacy:    "[facility] - [place], [place] - [address] [facility] [address]  [facility] - [place], [place] - [email or web address]    Electronically signed by MOSHE Flores CNP   Date: 01/23/23                      Asthma Triggers  How To Control Things That Make Your Asthma Worse    Triggers are things that make your asthma worse.  Look at the list below to help you find your triggers and what you can do about them.  You can help prevent asthma flare-ups by staying away from your triggers.      Trigger                                                          What you can do   Cigarette Smoke  Tobacco smoke can make asthma worse. Do not allow smoking in your home, car or around you.  Be sure no one smokes at a child s day care or school.  If you smoke, ask your health care provider for ways to help you quit.  Ask family members to quit too.  Ask your health care provider for a referral to Quit Plan to help you quit smoking, or call 3-930-484-PLAN.     Colds, Flu, Bronchitis  These are common triggers of asthma. Wash your hands often.  Don t touch your eyes, nose or mouth.  Get a flu shot every year.     Dust Mites  These are tiny bugs that live in cloth or carpet. They are too small to see. Wash sheets and blankets in hot water every week.   Encase pillows and mattress in dust mite proof covers.  Avoid having carpet if you can. If you have carpet, vacuum weekly.   Use a dust mask and HEPA vacuum.   Pollen and Outdoor Mold  Some people are allergic to trees, grass, or weed pollen, or molds. Try to keep your windows closed.  Limit time out doors when  #Gastrostomy malfunction/buried bumper  CT imaging reviewed with DR. Estrada from surgery.   Will plan for attempted joint surgery/GI procedure to close current gastrostomy and place GJ tube early next week.  aspiration precautions w/tube feeds    #Recurrent C.diff   per ID recs  would not keep rectal tube in too long given risk for causing rectal ulcer      pollen count is high.   Ask you health care provider about taking medicine during allergy season.     Animal Dander  Some people are allergic to skin flakes, urine or saliva from pets with fur or feathers. Keep pets with fur or feathers out of your home.    If you can t keep the pet outdoors, then keep the pet out of your bedroom.  Keep the bedroom door closed.  Keep pets off cloth furniture and away from stuffed toys.     Mice, Rats, and Cockroaches   Some people are allergic to the waste from these pests.   Cover food and garbage.  Clean up spills and food crumbs.  Store grease in the refrigerator.   Keep food out of the bedroom.   Indoor Mold  This can be a trigger if your home has high moisture. Fix leaking faucets, pipes, or other sources of water.   Clean moldy surfaces.  Dehumidify basement if it is damp and smelly.   Smoke, Strong Odors, and Sprays  These can reduce air quality. Stay away from strong odors and sprays, such as perfume, powder, hair spray, paints, smoke incense, paint, cleaning products, candles and new carpet.   Exercise or Sports  Some people with asthma have this trigger. Be active!  Ask your doctor about taking medicine before sports or exercise to prevent symptoms.    Warm up for 5-10 minutes before and after sports or exercise.     Other Triggers of Asthma  Cold air:  Cover your nose and mouth with a scarf.  Sometimes laughing or crying can be a trigger.  Some medicines and food can trigger asthma.

## 2024-04-12 LAB
ALBUMIN SERPL ELPH-MCNC: 2.7 G/DL — LOW (ref 3.3–5)
ALP SERPL-CCNC: 44 U/L — SIGNIFICANT CHANGE UP (ref 40–120)
ALT FLD-CCNC: 17 U/L — SIGNIFICANT CHANGE UP (ref 10–45)
ANION GAP SERPL CALC-SCNC: 17 MMOL/L — SIGNIFICANT CHANGE UP (ref 5–17)
APPEARANCE UR: CLEAR — SIGNIFICANT CHANGE UP
APTT BLD: 33.7 SEC — SIGNIFICANT CHANGE UP (ref 24.5–35.6)
AST SERPL-CCNC: 19 U/L — SIGNIFICANT CHANGE UP (ref 10–40)
BILIRUB SERPL-MCNC: 0.8 MG/DL — SIGNIFICANT CHANGE UP (ref 0.2–1.2)
BILIRUB UR-MCNC: NEGATIVE — SIGNIFICANT CHANGE UP
BLD GP AB SCN SERPL QL: POSITIVE — SIGNIFICANT CHANGE UP
BUN SERPL-MCNC: 5 MG/DL — LOW (ref 7–23)
CALCIUM SERPL-MCNC: 8.7 MG/DL — SIGNIFICANT CHANGE UP (ref 8.4–10.5)
CHLORIDE SERPL-SCNC: 99 MMOL/L — SIGNIFICANT CHANGE UP (ref 96–108)
CO2 SERPL-SCNC: 21 MMOL/L — LOW (ref 22–31)
COLOR SPEC: YELLOW — SIGNIFICANT CHANGE UP
CREAT SERPL-MCNC: <0.3 MG/DL — LOW (ref 0.5–1.3)
CULTURE RESULTS: SIGNIFICANT CHANGE UP
DIFF PNL FLD: NEGATIVE — SIGNIFICANT CHANGE UP
EGFR: 113 ML/MIN/1.73M2 — SIGNIFICANT CHANGE UP
GLUCOSE BLDC GLUCOMTR-MCNC: 116 MG/DL — HIGH (ref 70–99)
GLUCOSE BLDC GLUCOMTR-MCNC: 121 MG/DL — HIGH (ref 70–99)
GLUCOSE BLDC GLUCOMTR-MCNC: 125 MG/DL — HIGH (ref 70–99)
GLUCOSE BLDC GLUCOMTR-MCNC: 126 MG/DL — HIGH (ref 70–99)
GLUCOSE BLDC GLUCOMTR-MCNC: 129 MG/DL — HIGH (ref 70–99)
GLUCOSE SERPL-MCNC: 123 MG/DL — HIGH (ref 70–99)
GLUCOSE UR QL: NEGATIVE MG/DL — SIGNIFICANT CHANGE UP
HCT VFR BLD CALC: 27.5 % — LOW (ref 34.5–45)
HGB BLD-MCNC: 7.6 G/DL — LOW (ref 11.5–15.5)
INR BLD: 1.54 RATIO — HIGH (ref 0.85–1.18)
KETONES UR-MCNC: >=160 MG/DL
LEUKOCYTE ESTERASE UR-ACNC: NEGATIVE — SIGNIFICANT CHANGE UP
MAGNESIUM SERPL-MCNC: 1.6 MG/DL — SIGNIFICANT CHANGE UP (ref 1.6–2.6)
MCHC RBC-ENTMCNC: 24.2 PG — LOW (ref 27–34)
MCHC RBC-ENTMCNC: 27.6 GM/DL — LOW (ref 32–36)
MCV RBC AUTO: 87.6 FL — SIGNIFICANT CHANGE UP (ref 80–100)
NITRITE UR-MCNC: NEGATIVE — SIGNIFICANT CHANGE UP
NRBC # BLD: 0 /100 WBCS — SIGNIFICANT CHANGE UP (ref 0–0)
PH UR: 6 — SIGNIFICANT CHANGE UP (ref 5–8)
PHOSPHATE SERPL-MCNC: 3.1 MG/DL — SIGNIFICANT CHANGE UP (ref 2.5–4.5)
PLATELET # BLD AUTO: 111 K/UL — LOW (ref 150–400)
POTASSIUM SERPL-MCNC: 3.6 MMOL/L — SIGNIFICANT CHANGE UP (ref 3.5–5.3)
POTASSIUM SERPL-SCNC: 3.6 MMOL/L — SIGNIFICANT CHANGE UP (ref 3.5–5.3)
PROT SERPL-MCNC: 6.1 G/DL — SIGNIFICANT CHANGE UP (ref 6–8.3)
PROT UR-MCNC: SIGNIFICANT CHANGE UP MG/DL
PROTHROM AB SERPL-ACNC: 16.7 SEC — HIGH (ref 9.5–13)
RBC # BLD: 3.14 M/UL — LOW (ref 3.8–5.2)
RBC # FLD: 19.7 % — HIGH (ref 10.3–14.5)
RH IG SCN BLD-IMP: POSITIVE — SIGNIFICANT CHANGE UP
SODIUM SERPL-SCNC: 137 MMOL/L — SIGNIFICANT CHANGE UP (ref 135–145)
SP GR SPEC: 1.01 — SIGNIFICANT CHANGE UP (ref 1–1.03)
SPECIMEN SOURCE: SIGNIFICANT CHANGE UP
UROBILINOGEN FLD QL: 0.2 MG/DL — SIGNIFICANT CHANGE UP (ref 0.2–1)
WBC # BLD: 8.36 K/UL — SIGNIFICANT CHANGE UP (ref 3.8–10.5)
WBC # FLD AUTO: 8.36 K/UL — SIGNIFICANT CHANGE UP (ref 3.8–10.5)

## 2024-04-12 PROCEDURE — 74177 CT ABD & PELVIS W/CONTRAST: CPT | Mod: 26

## 2024-04-12 PROCEDURE — 99233 SBSQ HOSP IP/OBS HIGH 50: CPT

## 2024-04-12 PROCEDURE — 71260 CT THORAX DX C+: CPT | Mod: 26

## 2024-04-12 PROCEDURE — 99233 SBSQ HOSP IP/OBS HIGH 50: CPT | Mod: FS

## 2024-04-12 RX ORDER — ACETAMINOPHEN 500 MG
1000 TABLET ORAL ONCE
Refills: 0 | Status: COMPLETED | OUTPATIENT
Start: 2024-04-12 | End: 2024-04-12

## 2024-04-12 RX ORDER — INSULIN GLARGINE 100 [IU]/ML
5 INJECTION, SOLUTION SUBCUTANEOUS
Refills: 0 | Status: DISCONTINUED | OUTPATIENT
Start: 2024-04-12 | End: 2024-04-20

## 2024-04-12 RX ORDER — SODIUM CHLORIDE 9 MG/ML
500 INJECTION, SOLUTION INTRAVENOUS ONCE
Refills: 0 | Status: COMPLETED | OUTPATIENT
Start: 2024-04-12 | End: 2024-04-12

## 2024-04-12 RX ADMIN — Medication 87.5 MICROGRAM(S): at 04:52

## 2024-04-12 RX ADMIN — Medication 5 MILLILITER(S): at 14:11

## 2024-04-12 RX ADMIN — Medication 1 DROP(S): at 05:34

## 2024-04-12 RX ADMIN — HYDROMORPHONE HYDROCHLORIDE 0.5 MILLIGRAM(S): 2 INJECTION INTRAMUSCULAR; INTRAVENOUS; SUBCUTANEOUS at 21:02

## 2024-04-12 RX ADMIN — LIDOCAINE 1 PATCH: 4 CREAM TOPICAL at 07:31

## 2024-04-12 RX ADMIN — Medication 5 MILLILITER(S): at 05:33

## 2024-04-12 RX ADMIN — PANTOPRAZOLE SODIUM 40 MILLIGRAM(S): 20 TABLET, DELAYED RELEASE ORAL at 05:33

## 2024-04-12 RX ADMIN — Medication 3 MILLILITER(S): at 23:08

## 2024-04-12 RX ADMIN — Medication 1 DROP(S): at 14:12

## 2024-04-12 RX ADMIN — Medication 1 SPRAY(S): at 05:35

## 2024-04-12 RX ADMIN — Medication 1 DROP(S): at 18:32

## 2024-04-12 RX ADMIN — Medication 5 MILLILITER(S): at 23:12

## 2024-04-12 RX ADMIN — LIDOCAINE 1 PATCH: 4 CREAM TOPICAL at 23:11

## 2024-04-12 RX ADMIN — CHLORHEXIDINE GLUCONATE 1 APPLICATION(S): 213 SOLUTION TOPICAL at 05:34

## 2024-04-12 RX ADMIN — Medication 5 MILLILITER(S): at 00:09

## 2024-04-12 RX ADMIN — Medication 1 SPRAY(S): at 18:35

## 2024-04-12 RX ADMIN — CEFEPIME 100 MILLIGRAM(S): 1 INJECTION, POWDER, FOR SOLUTION INTRAMUSCULAR; INTRAVENOUS at 05:33

## 2024-04-12 RX ADMIN — Medication 3 MILLILITER(S): at 05:22

## 2024-04-12 RX ADMIN — INSULIN GLARGINE 5 UNIT(S): 100 INJECTION, SOLUTION SUBCUTANEOUS at 18:34

## 2024-04-12 RX ADMIN — HYDROMORPHONE HYDROCHLORIDE 0.5 MILLIGRAM(S): 2 INJECTION INTRAMUSCULAR; INTRAVENOUS; SUBCUTANEOUS at 20:32

## 2024-04-12 RX ADMIN — SODIUM CHLORIDE 50 MILLILITER(S): 9 INJECTION, SOLUTION INTRAVENOUS at 18:32

## 2024-04-12 RX ADMIN — PANTOPRAZOLE SODIUM 40 MILLIGRAM(S): 20 TABLET, DELAYED RELEASE ORAL at 18:30

## 2024-04-12 RX ADMIN — Medication 1 APPLICATION(S): at 23:11

## 2024-04-12 RX ADMIN — Medication 3 MILLILITER(S): at 11:43

## 2024-04-12 RX ADMIN — ONDANSETRON 4 MILLIGRAM(S): 8 TABLET, FILM COATED ORAL at 20:31

## 2024-04-12 RX ADMIN — ONDANSETRON 4 MILLIGRAM(S): 8 TABLET, FILM COATED ORAL at 04:51

## 2024-04-12 RX ADMIN — CEFEPIME 100 MILLIGRAM(S): 1 INJECTION, POWDER, FOR SOLUTION INTRAMUSCULAR; INTRAVENOUS at 18:30

## 2024-04-12 RX ADMIN — Medication 1000 MILLIGRAM(S): at 14:25

## 2024-04-12 RX ADMIN — SODIUM CHLORIDE 50 MILLILITER(S): 9 INJECTION, SOLUTION INTRAVENOUS at 18:35

## 2024-04-12 RX ADMIN — Medication 1 APPLICATION(S): at 14:13

## 2024-04-12 RX ADMIN — Medication 5 MILLILITER(S): at 18:32

## 2024-04-12 RX ADMIN — Medication 1 DROP(S): at 00:08

## 2024-04-12 RX ADMIN — Medication 3 MILLILITER(S): at 17:25

## 2024-04-12 RX ADMIN — Medication 400 MILLIGRAM(S): at 11:34

## 2024-04-12 RX ADMIN — CHLORHEXIDINE GLUCONATE 15 MILLILITER(S): 213 SOLUTION TOPICAL at 18:30

## 2024-04-12 RX ADMIN — Medication 1 DROP(S): at 23:12

## 2024-04-12 RX ADMIN — Medication 1 APPLICATION(S): at 05:35

## 2024-04-12 RX ADMIN — SODIUM CHLORIDE 500 MILLILITER(S): 9 INJECTION, SOLUTION INTRAVENOUS at 13:38

## 2024-04-12 RX ADMIN — CHLORHEXIDINE GLUCONATE 15 MILLILITER(S): 213 SOLUTION TOPICAL at 05:35

## 2024-04-12 NOTE — PROGRESS NOTE ADULT - SUBJECTIVE AND OBJECTIVE BOX
Patient is a 74y old  Female who presents with a chief complaint of Dislodged G Tube (11 Apr 2024 11:24)      Interval Events:    REVIEW OF SYSTEMS:  [ ] Positive  [ ] All other systems negative  [ ] Unable to assess ROS because ________    Vital Signs Last 24 Hrs  T(C): 36.3 (04-12-24 @ 06:07), Max: 39.3 (04-11-24 @ 16:24)  T(F): 97.3 (04-12-24 @ 06:07), Max: 102.7 (04-11-24 @ 16:24)  HR: 90 (04-12-24 @ 06:07) (80 - 128)  BP: 135/67 (04-12-24 @ 06:07) (115/53 - 146/60)  RR: 20 (04-11-24 @ 17:48) (13 - 22)  SpO2: 99% (04-12-24 @ 06:07) (97% - 100%)    PHYSICAL EXAM:  HEENT:   [ ]Tracheostomy:  [ ]Pupils equal  [ ]No oral lesions  [ ]Abnormal    SKIN  [ ]No Rash  [ ] Abnormal  [ ] pressure    CARDIAC  [ ]Regular  [ ]Abnormal    PULMONARY  [ ]Bilateral Clear Breath Sounds  [ ]Normal Excursion  [ ]Abnormal    GI  [ ]PEG      [ ] +BS		              [ ]Soft, nondistended, nontender	  [ ]Abnormal    MUSCULOSKELETAL                                   [ ]Bedbound                 [ ]Abnormal    [ ]Ambulatory/OOB to chair                           EXTREMITIES                                         [ ]Normal  [ ]Edema                           NEUROLOGIC  [ ] Normal, non focal  [ ] Focal findings:    PSYCHIATRIC  [ ]Alert and appropriate  [ ] Sedated	 [ ]Agitated    :  Mcbride: [ ] Yes, if yes: Date of Placement:                   [  ] No    LINES: Central Lines [ ] Yes, if yes: Date of Placement                                     [  ] No    HOSPITAL MEDICATIONS:  MEDICATIONS  (STANDING):  albuterol/ipratropium for Nebulization 3 milliLiter(s) Nebulizer every 6 hours  amLODIPine   Tablet 10 milliGRAM(s) Oral <User Schedule>  artificial tears (preservative free) Ophthalmic Solution 1 Drop(s) Both EYES four times a day  ascorbic acid 500 milliGRAM(s) Oral daily  Biotene Dry Mouth Oral Rinse 5 milliLiter(s) Swish and Spit every 6 hours  cefepime   IVPB 1000 milliGRAM(s) IV Intermittent every 12 hours  cefepime   IVPB      chlorhexidine 0.12% Liquid 15 milliLiter(s) Oral Mucosa every 12 hours  chlorhexidine 4% Liquid 1 Application(s) Topical <User Schedule>  erythromycin   Ointment 1 Application(s) Both EYES three times a day  escitalopram 10 milliGRAM(s) Oral <User Schedule>  fentaNYL   Patch  25 MICROgram(s)/Hr 1 Patch Transdermal every 72 hours  gabapentin Solution 100 milliGRAM(s) Oral <User Schedule>  insulin glargine Injectable (LANTUS) 7 Unit(s) SubCutaneous <User Schedule>  insulin lispro (ADMELOG) corrective regimen sliding scale   SubCutaneous every 6 hours  lactated ringers. 1000 milliLiter(s) (50 mL/Hr) IV Continuous <Continuous>  levothyroxine Injectable 87.5 MICROGram(s) IV Push <User Schedule>  lidocaine   4% Patch 1 Patch Transdermal at bedtime  lidocaine   4% Patch 1 Patch Transdermal at bedtime  melatonin Liquid 3 milliGRAM(s) Oral at bedtime  multivitamin/minerals/iron Oral Solution (CENTRUM) 15 milliLiter(s) Oral daily  pantoprazole  Injectable 40 milliGRAM(s) IV Push every 12 hours  predniSONE  Solution 5 milliGRAM(s) Oral <User Schedule>  QUEtiapine 12.5 milliGRAM(s) Oral <User Schedule>  simethicone 80 milliGRAM(s) Chew every 12 hours  sodium chloride 0.65% Nasal 1 Spray(s) Both Nostrils two times a day  vancomycin    Solution 125 milliGRAM(s) Oral every 6 hours    MEDICATIONS  (PRN):  calamine/zinc oxide Lotion 1 Application(s) Topical daily PRN Rash and/or Itching  HYDROmorphone  Injectable 1 milliGRAM(s) IV Push every 6 hours PRN Severe Pain (7 - 10)  HYDROmorphone  Injectable 0.5 milliGRAM(s) IV Push every 4 hours PRN Moderate Pain (4 - 6)  ondansetron Injectable 4 milliGRAM(s) IV Push every 8 hours PRN Nausea and/or Vomiting  sodium chloride 0.65% Nasal 1 Spray(s) Both Nostrils every 12 hours PRN Congestion  traMADol 25 milliGRAM(s) Oral every 8 hours PRN Mild Pain (1 - 3)      LABS:                        7.6    8.36  )-----------( 111      ( 12 Apr 2024 06:36 )             27.5     04-12    137  |  99  |  5<L>  ----------------------------<  123<H>  3.6   |  21<L>  |  <0.30<L>    Ca    8.7      12 Apr 2024 06:36  Phos  3.1     04-12  Mg     1.6     04-12    TPro  6.1  /  Alb  2.7<L>  /  TBili  0.8  /  DBili  x   /  AST  19  /  ALT  17  /  AlkPhos  44  04-12    PT/INR - ( 12 Apr 2024 06:36 )   PT: 16.7 sec;   INR: 1.54 ratio         PTT - ( 12 Apr 2024 06:36 )  PTT:33.7 sec  Urinalysis Basic - ( 12 Apr 2024 06:36 )    Color: x / Appearance: x / SG: x / pH: x  Gluc: 123 mg/dL / Ketone: x  / Bili: x / Urobili: x   Blood: x / Protein: x / Nitrite: x   Leuk Esterase: x / RBC: x / WBC x   Sq Epi: x / Non Sq Epi: x / Bacteria: x      Arterial Blood Gas:  04-11 @ 15:10  7.37/40/47/23/80.4/-2.0  ABG lactate: --      CAPILLARY BLOOD GLUCOSE    MICROBIOLOGY:     RADIOLOGY:  [ ] Reviewed and interpreted by me    Mode: AC/ CMV (Assist Control/ Continuous Mandatory Ventilation)  RR (machine): 12  TV (machine): 350  FiO2: 30  PEEP: 5  ITime: 1  MAP: 12  PIP: 30   Patient is a 74y old  Female who presents with a chief complaint of Dislodged G Tube (11 Apr 2024 11:24)      Interval Events: Afebrile over night.     REVIEW OF SYSTEMS:  [X] Positive:  Stomach pain  [ ] All other systems negative  [ ] Unable to assess ROS because ________    Vital Signs Last 24 Hrs  T(C): 36.3 (04-12-24 @ 06:07), Max: 39.3 (04-11-24 @ 16:24)  T(F): 97.3 (04-12-24 @ 06:07), Max: 102.7 (04-11-24 @ 16:24)  HR: 90 (04-12-24 @ 06:07) (80 - 128)  BP: 135/67 (04-12-24 @ 06:07) (115/53 - 146/60)  RR: 20 (04-11-24 @ 17:48) (13 - 22)  SpO2: 99% (04-12-24 @ 06:07) (97% - 100%)      PHYSICAL EXAM:  HEENT:   [X] Tracheostomy:  #8 distal XLT Cuffed Shiley  [X] PERRL B/L; EOMI; eyes red L>R  [X] No oral lesions  [ ] Abnormal    SKIN  [ ] No Rash  [ ] Abnormal  [X] pressure: B/L buttocks/sacral deep tissue injury     CARDIAC  [X] Regular  [ ]Abnormal    PULMONARY  [X] Bilateral Clear Breath Sounds  [ ] Normal Excursion  [ ] Abnormal    GI  [X] PEG      [X] +BS		              [X] Soft, nondistended, nontender	  [ ] Abnormal    MUSCULOSKELETAL                                   [X] Bedbound                 [ ] Abnormal    [ ] Ambulatory/OOB to chair                           EXTREMITIES                                         [X] Normal  [ ]Edema                           NEUROLOGIC  [ ] Normal, non focal  [X] Focal findings:  Alert and Oriented x3; responds to questions by mouthing words; +gag reflex/cough; no facial asymmetry observed; moves all distal limbs upon request; B/L plantar flexion    PSYCHIATRIC  [X] Lethargic but arousable, affect appropriate  [ ] Sedated	 [ ]Agitated    :  Mcbride: [ ] Yes, if yes: Date of Placement:                   [X ] No    LINES: Central Lines [ ] Yes, if yes: Date of Placement                                     [X ] No      HOSPITAL MEDICATIONS:  MEDICATIONS  (STANDING):  albuterol/ipratropium for Nebulization 3 milliLiter(s) Nebulizer every 6 hours  amLODIPine   Tablet 10 milliGRAM(s) Oral <User Schedule>  artificial tears (preservative free) Ophthalmic Solution 1 Drop(s) Both EYES four times a day  ascorbic acid 500 milliGRAM(s) Oral daily  Biotene Dry Mouth Oral Rinse 5 milliLiter(s) Swish and Spit every 6 hours  cefepime   IVPB 1000 milliGRAM(s) IV Intermittent every 12 hours  cefepime   IVPB      chlorhexidine 0.12% Liquid 15 milliLiter(s) Oral Mucosa every 12 hours  chlorhexidine 4% Liquid 1 Application(s) Topical <User Schedule>  erythromycin   Ointment 1 Application(s) Both EYES three times a day  escitalopram 10 milliGRAM(s) Oral <User Schedule>  fentaNYL   Patch  25 MICROgram(s)/Hr 1 Patch Transdermal every 72 hours  gabapentin Solution 100 milliGRAM(s) Oral <User Schedule>  insulin glargine Injectable (LANTUS) 7 Unit(s) SubCutaneous <User Schedule>  insulin lispro (ADMELOG) corrective regimen sliding scale   SubCutaneous every 6 hours  lactated ringers. 1000 milliLiter(s) (50 mL/Hr) IV Continuous <Continuous>  levothyroxine Injectable 87.5 MICROGram(s) IV Push <User Schedule>  lidocaine   4% Patch 1 Patch Transdermal at bedtime  lidocaine   4% Patch 1 Patch Transdermal at bedtime  melatonin Liquid 3 milliGRAM(s) Oral at bedtime  multivitamin/minerals/iron Oral Solution (CENTRUM) 15 milliLiter(s) Oral daily  pantoprazole  Injectable 40 milliGRAM(s) IV Push every 12 hours  predniSONE  Solution 5 milliGRAM(s) Oral <User Schedule>  QUEtiapine 12.5 milliGRAM(s) Oral <User Schedule>  simethicone 80 milliGRAM(s) Chew every 12 hours  sodium chloride 0.65% Nasal 1 Spray(s) Both Nostrils two times a day  vancomycin    Solution 125 milliGRAM(s) Oral every 6 hours    MEDICATIONS  (PRN):  calamine/zinc oxide Lotion 1 Application(s) Topical daily PRN Rash and/or Itching  HYDROmorphone  Injectable 1 milliGRAM(s) IV Push every 6 hours PRN Severe Pain (7 - 10)  HYDROmorphone  Injectable 0.5 milliGRAM(s) IV Push every 4 hours PRN Moderate Pain (4 - 6)  ondansetron Injectable 4 milliGRAM(s) IV Push every 8 hours PRN Nausea and/or Vomiting  sodium chloride 0.65% Nasal 1 Spray(s) Both Nostrils every 12 hours PRN Congestion  traMADol 25 milliGRAM(s) Oral every 8 hours PRN Mild Pain (1 - 3)      LABS:                        7.6    8.36  )-----------( 111      ( 12 Apr 2024 06:36 )             27.5     04-12    137  |  99  |  5<L>  ----------------------------<  123<H>  3.6   |  21<L>  |  <0.30<L>    Ca    8.7      12 Apr 2024 06:36  Phos  3.1     04-12  Mg     1.6     04-12    TPro  6.1  /  Alb  2.7<L>  /  TBili  0.8  /  DBili  x   /  AST  19  /  ALT  17  /  AlkPhos  44  04-12    PT/INR - ( 12 Apr 2024 06:36 )   PT: 16.7 sec;   INR: 1.54 ratio         PTT - ( 12 Apr 2024 06:36 )  PTT:33.7 sec  Urinalysis Basic - ( 12 Apr 2024 06:36 )    Color: x / Appearance: x / SG: x / pH: x  Gluc: 123 mg/dL / Ketone: x  / Bili: x / Urobili: x   Blood: x / Protein: x / Nitrite: x   Leuk Esterase: x / RBC: x / WBC x   Sq Epi: x / Non Sq Epi: x / Bacteria: x      Arterial Blood Gas:  04-11 @ 15:10  7.37/40/47/23/80.4/-2.0  ABG lactate: --      CAPILLARY BLOOD GLUCOSE    MICROBIOLOGY:     RADIOLOGY:  [ ] Reviewed and interpreted by me    Mode: AC/ CMV (Assist Control/ Continuous Mandatory Ventilation)  RR (machine): 12  TV (machine): 350  FiO2: 30  PEEP: 5  ITime: 1  MAP: 12  PIP: 30

## 2024-04-12 NOTE — PROGRESS NOTE ADULT - ASSESSMENT
75yo woman  h/o T2DM (4/4/24: A1C = 6.2%), HTN, HLD, anemia, hypothyroidism, RA, fibromyalgia, remote cerebral aneurysm repair, acute hypercapnic respiratory failure with tracheostomy, PEG tube (initially placed 2022), and bedbound, recurrent C diff presented for PEG tube dislodgment. Admitted 4/2/24  weight = 54.5 kg    Recurrent C diff - first episode Aug 2023 treated with tapering PO vanco, recurrent episode 1/31/24 treated with PO vanco and then fidaxomicin completed in Feb - most recently tested positive 3/20/24 seen by Dr. Newman 3/29 outpatient, started on fidaxomicin that day - tube feeds concurrently held, unclear if improving afterwards per family  Chronic sacral decubitus wound  3/20 Klebisella bacteruria R only to amp and sputum few Pseudomonas R to FQs w/o polys on gram stian - treatment of urine was deferred to avoid exacerbating C diff    Tmax 100, no leukocytosis  Concern for cellulitis around PEG tube site - area with very minimal erythema, remarkable receded from skin paula placed on admission  UA 11 WBC  CT a/p: no infectious focus or colitis  MRSA PCR+    4/3 IR - PEG exchanged bedside to 20 Fr Kangaroo EnFit  - Bedside XR demonstrates appropriately positioned G-tube with contrast filling into the stomach and bowel.    4/2 Blood Cultures x 2 NGTD;  Positive MRSA/MSSA PCR  4/3 Urine cultures NEG  4/4 fever  4/4 Wound care assessment appreciated:   " area of persistent nonblanchable dark discoloration that is inconsistent with a patient's surrounding skin tone as well as a white juan j film noted over B/L buttocks/sacral skin, area measures approximately 10cm x 10cm x 0cm- presentation is consistent with a deep tissue injury in evolution with incontinence and fungal involvement present on admission. Within the apex of the gluteal cleft/base of sacrum there is full thickness skin loss with red, beefy tissue noted, area measures approximately 3cm x 1cm x 0.3cm- presentation is consistent with a deep tissue injury in evolution with incontinence involvement present on admission."    Antibiotics  Meropenem    poVanco 4/2  Fdaxomicin    cefepime 4/11     PEG site no obvious cellulitis - probably more irritation from displacement/leaked contents, no indication for antimicrobials.         Suggest:  completed  fidaxomicin for  for recurrent C diff    po vanco as follow up although lack of PEG is making this difficult       S. Multiphila and P aeruginosa in sputum at present  and BC with serratia from 4/7   Source of serratia unclear :   likely related  to PEG manipulations     discussed with GI and team   for replacement of PEG : cleared as of now from ID view   Discussed with team and daughter in detail last pm and this am     dc meropenem ( colonized with carb r strains .and no resistance markers on the serratia )  complete ab with cefepime until 4/13 pm   no therapy for sputum isolates unless signs of pna develop.      Was to go to OR for replacement of g tube but cancelled after fever yesterday   currently stable    discussed with daughter in detail every day    No contraindication to placement of g tube at bedside .  needs follow up chest x ray and await BC results from spike.  if no further fever can reschedule for OR  on Monday

## 2024-04-12 NOTE — PROGRESS NOTE ADULT - PROBLEM SELECTOR PLAN 4
- possibly reactive in setting of antibiotics/active infection  - history of AOCD  - ID aware - continue antibiotics  - continue to monitor CBC

## 2024-04-12 NOTE — PROGRESS NOTE ADULT - ASSESSMENT
73 yo F PMH T2DM on insulin, HTN, HLD, hypothyroidism, RA on Prednisone, Fibromyalgia, cerebral aneurysm s/p repair, chronic hypercapnic respiratory failure s/p trach (vent dependent) and Chronic PEG 2022 , recurrent C.diff infection (follows with Dr. Newman) , bedbound who presented from home with complaints of possible G tube dislodgement and redness around the site. Had CT Abdomen/Pelvis done showing dislodgement of G tube with balloon in the anterior abdominal wall with air filled track to stomach. Admitted to MICU for ventilator management and given vancomycin/meropenem empirically for treatment of cellulitis. Per family patient has had Known c diff infection for past 2-3 weeks.  Treating with Fidaxomicin.  IR team exchanged PEG on 4/3 however later in the day it remained with leakage.  IR Attending adjusted PEG at bedside on 4/4 and PEG remained with moderate amount of PEG leakage once feeds initiated at reduced rate.       4/9:  IR was called yesterday for an explanation/plan for procedure (for our team and for pts daughter) - we were informed someone would be bedside later in evening rounds.  This am IR called again to discuss plan for procedure scheduled for today 4/9.  Pt was still on schedule this AM, was informed that someone from IR team will come to bedside to evaluate/discuss plan/consent with daughter prior to procedure.  Daughter frustrated, case discussed between daughter/IR PA.  IR PA Lesley discussed plan with her team, case was cancelled for today, GI brought on board as per pts daughters request for second opinion.  GI - Dr. Cleaning now involved in case, plan for possible new PEG with GI.  Pt now with positive blood cultures - serratia, awaiting sensitivities - will need to hold off on new PEG until bacteremia clears.  PEG then fell out and was found in bed, Dr. Cleaning called, emerson placed in track, Dr. Cleaning to replace PEG today.  Possible temporary NGT but needs placement with xrays to confirm no extravasation at PEG site.  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  ID - Dr. Javed, continue meropenem until sensitivities result, aware daughter is concerned about the thrombocytopenia which she contributes to antibiotics - continue current therapy as per Dr. Javed.  Addressed Zinvaya with Dr. Javed (initially spoken about between Dr. Newman and Daughter), spoke with pharmacy, hospital does not carry that medication.  Continue vanco PO until meropenem course completed - ok for missed doses because of PEG complications.  Midline placed.  Daughter Marysol aware.      4/10:  No acute events.  Patient remains NPO as awaiting plan in regards to PEG replacement.  Also awaiting negative blood cultures.  Remains on Meropenem.  4/11:  Antibiotics switched to Cefepime as per ID.  Patient had fever of 102.7F which prevented patient from having PEG revision today.  Patient complained of feeling like  "I can't breathe", tidal volume increased from 300 to 350 with resolution of symptoms.  ABG (venous specimen) collected which was appropriate to continue current ventilator settings.  Patient also complaining of burning pain in stomach, likely gastritis/GERD, increased protonix to BID. 75 yo F PMH T2DM on insulin, HTN, HLD, hypothyroidism, RA on Prednisone, Fibromyalgia, cerebral aneurysm s/p repair, chronic hypercapnic respiratory failure s/p trach (vent dependent) and Chronic PEG 2022 , recurrent C.diff infection (follows with Dr. Newman) , bedbound who presented from home with complaints of possible G tube dislodgement and redness around the site. Had CT Abdomen/Pelvis done showing dislodgement of G tube with balloon in the anterior abdominal wall with air filled track to stomach. Admitted to MICU for ventilator management and given vancomycin/meropenem empirically for treatment of cellulitis. Per family patient has had Known c diff infection for past 2-3 weeks.  Treating with Fidaxomicin.  IR team exchanged PEG on 4/3 however later in the day it remained with leakage.  IR Attending adjusted PEG at bedside on 4/4 and PEG remained with moderate amount of PEG leakage once feeds initiated at reduced rate.  GI team re-consulted as second opinion for PEG management.  On 4/9 PEG was found out of place, a emerson catheter was placed in the stoma to maintain patency.  Patient was planned for PEG revision with both Surgery and GI team involved on 4/11 however patient had fevers up to 102F and procedure was cancelled for infectious work-up.  Antibiotics then switched from Meropenem to Cefepime.  An 18 Fr PEG tube placed at bedside on 4/12 (original PEG size was 20Fr) as stoma site appears to have closed.      4/9:  IR was called yesterday for an explanation/plan for procedure (for our team and for pts daughter) - we were informed someone would be bedside later in evening rounds.  This am IR called again to discuss plan for procedure scheduled for today 4/9.  Pt was still on schedule this AM, was informed that someone from IR team will come to bedside to evaluate/discuss plan/consent with daughter prior to procedure.  Daughter frustrated, case discussed between daughter/IR PA.  IR PA Lesley discussed plan with her team, case was cancelled for today, GI brought on board as per pts daughters request for second opinion.  GI - Dr. Cleaning now involved in case, plan for possible new PEG with GI.  Pt now with positive blood cultures - serratia, awaiting sensitivities - will need to hold off on new PEG until bacteremia clears.  PEG then fell out and was found in bed, Dr. Cleaning called, emerson placed in track, Dr. Cleaning to replace PEG today.  Possible temporary NGT but needs placement with xrays to confirm no extravasation at PEG site.  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  ID - Dr. Javed, continue meropenem until sensitivities result, aware daughter is concerned about the thrombocytopenia which she contributes to antibiotics - continue current therapy as per Dr. Javed.  Addressed Zinvaya with Dr. Javed (initially spoken about between Dr. Newman and Daughter), spoke with pharmacy, hospital does not carry that medication.  Continue vanco PO until meropenem course completed - ok for missed doses because of PEG complications.  Midline placed.  Daughter Marysol aware.      4/10:  No acute events.  Patient remains NPO as awaiting plan in regards to PEG replacement.  Also awaiting negative blood cultures.  Remains on Meropenem.  4/11:  Antibiotics switched to Cefepime as per ID.  Patient had fever of 102.7F which prevented patient from having PEG revision today.  Patient complained of feeling like  "I can't breathe", tidal volume increased from 300 to 350 with resolution of symptoms.  ABG (venous specimen) collected which was appropriate to continue current ventilator settings.  Patient also complaining of burning pain in stomach, likely gastritis/GERD, increased protonix to BID.  4/12: 18Fr PEG placed in stomach at bedside.  CT chest/abd/pelv w/ contrast as part of infectious work-up and also to assess PEG tract.  Will f/u with GI once CT done to determine if PEG can be used or not.  Remains in Cefepime.

## 2024-04-12 NOTE — PROGRESS NOTE ADULT - PROBLEM SELECTOR PLAN 5
Chronic Hypoxemic/Hypercapnic Respiratory Failure  Chronic Trach/Vent dependant 2/2 Hypercapnic Resp Failure since 10/2022   - S/p Trach # 8 Distal XLT Shiley Cuffed   - Home Vent: volume /12/30%/+5   Settings changed on 4/11 to 350/12/30%/+5 as she complained of dyspnea.  - PS trials as tolerated.  - Chest PT, duonebs PRN

## 2024-04-12 NOTE — PROGRESS NOTE ADULT - ASSESSMENT
#Gastrostomy malfunction/buried bumper  Mcbride catheter was removed and replaced at bedside with an 18Fr G-tube. OK to use for meds, would continue holding feeds for now.   Pt is awaiting CT A/P, will obtain w/ contrast through the G-tube. If the tract looks to be healing and there is minimal leakage at the site, this tube may suffice. Otherwise, can plan for new PEG/PEG-J.    #Recurrent C.diff   per ID recs  would not keep rectal tube in too long given risk for causing rectal ulcer

## 2024-04-12 NOTE — PROGRESS NOTE ADULT - SUBJECTIVE AND OBJECTIVE BOX
Chief Complaint:  Patient is a 74y old  Female who presents with a chief complaint of Dislodged G Tube (13 Apr 2024 07:40)      Date of service 04-13-24 @ 08:43      Interval Events:   low grade fever    Hospital Medications:  albuterol/ipratropium for Nebulization 3 milliLiter(s) Nebulizer every 6 hours  amLODIPine   Tablet 10 milliGRAM(s) Oral <User Schedule>  artificial tears (preservative free) Ophthalmic Solution 1 Drop(s) Both EYES four times a day  ascorbic acid 500 milliGRAM(s) Oral daily  Biotene Dry Mouth Oral Rinse 5 milliLiter(s) Swish and Spit every 6 hours  calamine/zinc oxide Lotion 1 Application(s) Topical daily PRN  cefepime   IVPB 1000 milliGRAM(s) IV Intermittent every 12 hours  cefepime   IVPB      chlorhexidine 0.12% Liquid 15 milliLiter(s) Oral Mucosa every 12 hours  chlorhexidine 4% Liquid 1 Application(s) Topical <User Schedule>  erythromycin   Ointment 1 Application(s) Both EYES three times a day  escitalopram 10 milliGRAM(s) Oral <User Schedule>  fentaNYL   Patch  25 MICROgram(s)/Hr 1 Patch Transdermal every 72 hours  gabapentin Solution 100 milliGRAM(s) Oral <User Schedule>  HYDROmorphone  Injectable 1 milliGRAM(s) IV Push every 6 hours PRN  HYDROmorphone  Injectable 0.5 milliGRAM(s) IV Push every 4 hours PRN  insulin glargine Injectable (LANTUS) 5 Unit(s) SubCutaneous <User Schedule>  insulin lispro (ADMELOG) corrective regimen sliding scale   SubCutaneous every 6 hours  lactated ringers. 1000 milliLiter(s) IV Continuous <Continuous>  levothyroxine Injectable 87.5 MICROGram(s) IV Push <User Schedule>  lidocaine   4% Patch 1 Patch Transdermal at bedtime  lidocaine   4% Patch 1 Patch Transdermal at bedtime  magnesium sulfate  IVPB 2 Gram(s) IV Intermittent once  melatonin Liquid 3 milliGRAM(s) Oral at bedtime  multivitamin/minerals/iron Oral Solution (CENTRUM) 15 milliLiter(s) Oral daily  ondansetron Injectable 4 milliGRAM(s) IV Push every 8 hours PRN  pantoprazole  Injectable 40 milliGRAM(s) IV Push every 12 hours  potassium chloride  10 mEq/100 mL IVPB 10 milliEquivalent(s) IV Intermittent every 1 hour  predniSONE  Solution 5 milliGRAM(s) Oral <User Schedule>  QUEtiapine 12.5 milliGRAM(s) Oral <User Schedule>  simethicone 80 milliGRAM(s) Chew every 12 hours  sodium chloride 0.65% Nasal 1 Spray(s) Both Nostrils every 12 hours PRN  vancomycin    Solution 125 milliGRAM(s) Oral every 6 hours        Review of Systems:  General:  No wt loss, fevers, chills, night sweats, fatigue,   Eyes:  Good vision, no reported pain  ENT:  No sore throat, pain, runny nose, dysphagia  CV:  No pain, palpitations, hypo/hypertension  Resp:  No dyspnea, cough, tachypnea, wheezing  GI:  See HPI  :  No pain, bleeding, incontinence, nocturia  Muscle:  No pain, weakness  Neuro:  No weakness, tingling, memory problems  Psych:  No fatigue, insomnia, mood problems, depression  Endocrine:  No polyuria, polydipsia, cold/heat intolerance  Heme:  No petechiae, ecchymosis, easy bruisability  Integumentary:  No rash, edema    PHYSICAL EXAM:   Vital Signs:  Vital Signs Last 24 Hrs  T(C): 37.8 (13 Apr 2024 06:24), Max: 38.3 (12 Apr 2024 10:07)  T(F): 100.1 (13 Apr 2024 06:24), Max: 100.9 (12 Apr 2024 10:07)  HR: 116 (13 Apr 2024 06:24) (80 - 116)  BP: 148/48 (13 Apr 2024 05:42) (110/58 - 162/63)  BP(mean): --  RR: 19 (13 Apr 2024 05:42) (12 - 21)  SpO2: 100% (13 Apr 2024 05:42) (98% - 100%)    Parameters below as of 13 Apr 2024 05:42  Patient On (Oxygen Delivery Method): ventilator      Daily     Daily       PHYSICAL EXAM:     GENERAL:  Appears stated age, well-groomed, well-nourished, no distress  HEENT:  NC/AT,  conjunctivae anicteric, clear and pink,   NECK: supple, trachea midline  CHEST:  Full & symmetric excursion, no increased effort, breath sounds clear  HEART:  Regular rhythm, no JVD  ABDOMEN:  Soft, non-tender, non-distended, normoactive bowel sounds,  no masses , no hepatosplenomegaly  EXTREMITIES:  no cyanosis,clubbing or edema  SKIN:  No rash, erythema, or, ecchymoses, no jaundice  NEURO:  Alert, non-focal, no asterixis  PSYCH: Appropriate affect, oriented to place and time  RECTAL: Deferred      LABS Personally reviewed by me:                        7.6    8.36  )-----------( 111      ( 12 Apr 2024 06:36 )             27.5       04-13    136  |  102  |  <4<L>  ----------------------------<  147<H>  3.3<L>   |  17<L>  |  <0.30<L>    Ca    9.0      13 Apr 2024 07:08  Phos  3.1     04-13  Mg     1.4     04-13    TPro  6.2  /  Alb  2.5<L>  /  TBili  0.9  /  DBili  x   /  AST  23  /  ALT  21  /  AlkPhos  47  04-13    LIVER FUNCTIONS - ( 13 Apr 2024 07:08 )  Alb: 2.5 g/dL / Pro: 6.2 g/dL / ALK PHOS: 47 U/L / ALT: 21 U/L / AST: 23 U/L / GGT: x           PT/INR - ( 13 Apr 2024 07:23 )   PT: 17.0 sec;   INR: 1.57 ratio         PTT - ( 13 Apr 2024 07:23 )  PTT:34.4 sec  Urinalysis Basic - ( 13 Apr 2024 07:08 )    Color: x / Appearance: x / SG: x / pH: x  Gluc: 147 mg/dL / Ketone: x  / Bili: x / Urobili: x   Blood: x / Protein: x / Nitrite: x   Leuk Esterase: x / RBC: x / WBC x   Sq Epi: x / Non Sq Epi: x / Bacteria: x                              7.6    8.36  )-----------( 111      ( 12 Apr 2024 06:36 )             27.5                         8.8    8.61  )-----------( 120      ( 11 Apr 2024 16:06 )             30.3                         8.4    6.08  )-----------( 130      ( 10 Apr 2024 09:19 )             29.8       Imaging personally reviewed by me:

## 2024-04-12 NOTE — PROGRESS NOTE ADULT - PROBLEM SELECTOR PLAN 3
intermittently persistent high fevers  - IV Vanco/meropenem x1 on admission for concern for cellulitis  - 4/2 blood cultures negative x2, MRSA/MSSA PCR +  - 4/3 urine culture negative, CT A/O - no infectious focus or colitis  - 4/5 Procal 6.6 increased from 0.08 on 4/3, lactate 4.3  - 4/7 fever, meropenem restarted - no leukocytosis - RVP neg, UA neg, dopplers neg  - 4/7 blood cultures positive for serratia.  Meropenem 4/7 -->4/11,  Cefepime 4/11 --> 4/13  - ID recs appreciated

## 2024-04-12 NOTE — PROGRESS NOTE ADULT - PROBLEM SELECTOR PLAN 2
Being treated for C diff as outpatient with follow up with Infectious disease, Dr Newman   - completed fidaxomicin (total 10 day course) 4/3 -->4/13  - ID recommends ZinPlava 50mg IV to be given to reduce Cdiff infections (is not carried in hospital)  - continue with PO vanco (started 4/8) until completion of Cefepime  - Infectious diease consult appreciated Being treated for C diff as outpatient with follow up with Infectious disease, Dr Newman   - completed fidaxomicin (total 10 day course) 4/3 -->4/13  - ID recommends ZinPlava 50mg IV to be given to reduce Cdiff infections (is not carried in hospital)  - continue with PO vanco (started 4/8) until completion of Cefepime on 4/13  - Infectious diease consult appreciated

## 2024-04-12 NOTE — PROGRESS NOTE ADULT - PROBLEM SELECTOR PLAN 1
PEG tube dislodgement on admission   - S/p CT abd/pelvis showing dislodgement of G tube with balloon in the anterior abdominal wall with air filled track to stomach  - initially GI and Surgery consulted   - S/p bedside exchange by IR on 4/3- # 20 Fr Kangaroo EnFit placed   - 4/4: PEG leaking.  Feeds held.  IR adjusted PEG at bedside. G-tube study pending to exclude extravasation. Patient remains NPO except for meds, on IVF, LR at 50ml/hr.  - 4/5: PEG still leaking, adjusted at bedside by IR Attending.  - 4/6: PEG still leaking, IR informed, planned for PEG revision on 4/8 or 4/9  - 4/8-4/9 IR has aborted procedure - GI Dr. Cleaning has been brought onto case for new PEG  - 4/9 PEG fell out spontaneously, Dr. Cleaning to replace today - plan is for eventual new PEG when cultures clear, ?possible temporary NGT upon removal of PEG to allow for closure of PEG site PEG tube dislodgement on admission   - S/p CT abd/pelvis showing dislodgement of G tube with balloon in the anterior abdominal wall with air filled track to stomach  - initially GI and Surgery consulted   - S/p bedside exchange by IR on 4/3- # 20 Fr Kangaroo EnFit placed   - 4/4: PEG leaking.  Feeds held.  IR adjusted PEG at bedside. G-tube study pending to exclude extravasation. Patient remains NPO except for meds, on IVF, LR at 50ml/hr.  - 4/5: PEG still leaking, adjusted at bedside by IR Attending.  - 4/6: PEG still leaking, IR informed, planned for PEG revision on 4/8 or 4/9  - 4/8-4/9 IR has aborted procedure - GI Dr. Cleaning has been brought onto case for new PEG  - 4/9 PEG fell out spontaneously, Dr. Cleaning to replace today - plan is for eventual new PEG when cultures clear, ?possible temporary NGT upon removal of PEG to allow for closure of PEG site  -4/12: Mcbride replaced with 18Fr PEG at bedside, bumper at 8cm (intentionally loose).  Will assess PEG site/tract wit CT abd, will not use until cleared by GI.

## 2024-04-12 NOTE — PROGRESS NOTE ADULT - SUBJECTIVE AND OBJECTIVE BOX
infectious diseases progress note:    Patient is a 74y old  Female who presents with a chief complaint of Dislodged G Tube (12 Apr 2024 07:31)        Cellulitis of abdominal wall               Allergies    pineapple (Unknown)  Tagamet (Unknown)  heparin (Unknown)  walnut (Unknown)  metronidazole (Rash)  penicillin (Unknown)  Lyrica (Unknown)  Pecan, Filbert, Hazelnut (Unknown)    Intolerances        ANTIBIOTICS/RELEVANT:  antimicrobials  cefepime   IVPB 1000 milliGRAM(s) IV Intermittent every 12 hours  cefepime   IVPB      vancomycin    Solution 125 milliGRAM(s) Oral every 6 hours    immunologic:    OTHER:  albuterol/ipratropium for Nebulization 3 milliLiter(s) Nebulizer every 6 hours  amLODIPine   Tablet 10 milliGRAM(s) Oral <User Schedule>  artificial tears (preservative free) Ophthalmic Solution 1 Drop(s) Both EYES four times a day  ascorbic acid 500 milliGRAM(s) Oral daily  Biotene Dry Mouth Oral Rinse 5 milliLiter(s) Swish and Spit every 6 hours  calamine/zinc oxide Lotion 1 Application(s) Topical daily PRN  chlorhexidine 0.12% Liquid 15 milliLiter(s) Oral Mucosa every 12 hours  chlorhexidine 4% Liquid 1 Application(s) Topical <User Schedule>  erythromycin   Ointment 1 Application(s) Both EYES three times a day  escitalopram 10 milliGRAM(s) Oral <User Schedule>  fentaNYL   Patch  25 MICROgram(s)/Hr 1 Patch Transdermal every 72 hours  gabapentin Solution 100 milliGRAM(s) Oral <User Schedule>  HYDROmorphone  Injectable 1 milliGRAM(s) IV Push every 6 hours PRN  HYDROmorphone  Injectable 0.5 milliGRAM(s) IV Push every 4 hours PRN  insulin glargine Injectable (LANTUS) 7 Unit(s) SubCutaneous <User Schedule>  insulin lispro (ADMELOG) corrective regimen sliding scale   SubCutaneous every 6 hours  lactated ringers. 1000 milliLiter(s) IV Continuous <Continuous>  levothyroxine Injectable 87.5 MICROGram(s) IV Push <User Schedule>  lidocaine   4% Patch 1 Patch Transdermal at bedtime  lidocaine   4% Patch 1 Patch Transdermal at bedtime  melatonin Liquid 3 milliGRAM(s) Oral at bedtime  multivitamin/minerals/iron Oral Solution (CENTRUM) 15 milliLiter(s) Oral daily  ondansetron Injectable 4 milliGRAM(s) IV Push every 8 hours PRN  pantoprazole  Injectable 40 milliGRAM(s) IV Push every 12 hours  predniSONE  Solution 5 milliGRAM(s) Oral <User Schedule>  QUEtiapine 12.5 milliGRAM(s) Oral <User Schedule>  simethicone 80 milliGRAM(s) Chew every 12 hours  sodium chloride 0.65% Nasal 1 Spray(s) Both Nostrils every 12 hours PRN  sodium chloride 0.65% Nasal 1 Spray(s) Both Nostrils two times a day  traMADol 25 milliGRAM(s) Oral every 8 hours PRN      Objective:  Vital Signs Last 24 Hrs  T(C): 36.3 (12 Apr 2024 06:07), Max: 39.3 (11 Apr 2024 16:24)  T(F): 97.3 (12 Apr 2024 06:07), Max: 102.7 (11 Apr 2024 16:24)  HR: 90 (12 Apr 2024 06:07) (80 - 128)  BP: 135/67 (12 Apr 2024 06:07) (115/53 - 146/60)  BP(mean): --  RR: 20 (11 Apr 2024 17:48) (13 - 22)  SpO2: 99% (12 Apr 2024 06:07) (97% - 100%)    Parameters below as of 12 Apr 2024 06:07  Patient On (Oxygen Delivery Method): ventilator           Eyes:NUBIA, EOMI  Ear/Nose/Throat: no oral lesion, no sinus tenderness on percussion	  Neck:no JVD, no lymphadenopathy, supple  Respiratory: CTA ruth  Cardiovascular: S1S2 RRR, no murmurs  Gastrointestinal:soft, (+) BS, no HSM  Extremities:no e/e/c        LABS:                        7.6    8.36  )-----------( 111      ( 12 Apr 2024 06:36 )             27.5     04-12    137  |  99  |  5<L>  ----------------------------<  123<H>  3.6   |  21<L>  |  <0.30<L>    Ca    8.7      12 Apr 2024 06:36  Phos  3.1     04-12  Mg     1.6     04-12    TPro  6.1  /  Alb  2.7<L>  /  TBili  0.8  /  DBili  x   /  AST  19  /  ALT  17  /  AlkPhos  44  04-12    PT/INR - ( 12 Apr 2024 06:36 )   PT: 16.7 sec;   INR: 1.54 ratio         PTT - ( 12 Apr 2024 06:36 )  PTT:33.7 sec  Urinalysis Basic - ( 12 Apr 2024 06:36 )    Color: x / Appearance: x / SG: x / pH: x  Gluc: 123 mg/dL / Ketone: x  / Bili: x / Urobili: x   Blood: x / Protein: x / Nitrite: x   Leuk Esterase: x / RBC: x / WBC x   Sq Epi: x / Non Sq Epi: x / Bacteria: x          MICROBIOLOGY:    RECENT CULTURES:  04-09 @ 18:55 .Blood Blood-Peripheral                No growth at 48 Hours    04-07 @ 14:31 .Sputum Sputum   LENIN    Numerous polymorphonuclear leukocytes per low power field  Few Squamous epithelial cells per low power field  Numerous Gram Negative Rods per oil power field  Few Gram Positive Rods per oil power field    Stenotrophomonas maltophilia  Pseudomonas aeruginosa (Carbapenem Resistant)  Stenotrophomonas maltophilia     Mixed gram negative rods Culture includes  Moderate Stenotrophomonas maltophilia  Few Pseudomonas aeruginosa (Carbapenem Resistant)    04-07 @ 07:21 .Blood Blood-Peripheral   PCR    Growth in anaerobic bottle: Gram Variable Rods    Blood Culture PCR  Serratia marcescens  Blood Culture PCR     No growth at 4 days          RESPIRATORY CULTURES:              RADIOLOGY & ADDITIONAL STUDIES:        Pager [phone]  After 5 pm/weekends or if no response :[phone]

## 2024-04-13 LAB
ALBUMIN SERPL ELPH-MCNC: 2.5 G/DL — LOW (ref 3.3–5)
ALP SERPL-CCNC: 47 U/L — SIGNIFICANT CHANGE UP (ref 40–120)
ALT FLD-CCNC: 21 U/L — SIGNIFICANT CHANGE UP (ref 10–45)
ANION GAP SERPL CALC-SCNC: 17 MMOL/L — SIGNIFICANT CHANGE UP (ref 5–17)
APTT BLD: 34.4 SEC — SIGNIFICANT CHANGE UP (ref 24.5–35.6)
AST SERPL-CCNC: 23 U/L — SIGNIFICANT CHANGE UP (ref 10–40)
BILIRUB SERPL-MCNC: 0.9 MG/DL — SIGNIFICANT CHANGE UP (ref 0.2–1.2)
BUN SERPL-MCNC: <4 MG/DL — LOW (ref 7–23)
CALCIUM SERPL-MCNC: 9 MG/DL — SIGNIFICANT CHANGE UP (ref 8.4–10.5)
CHLORIDE SERPL-SCNC: 102 MMOL/L — SIGNIFICANT CHANGE UP (ref 96–108)
CO2 SERPL-SCNC: 17 MMOL/L — LOW (ref 22–31)
CREAT SERPL-MCNC: <0.3 MG/DL — LOW (ref 0.5–1.3)
CULTURE RESULTS: NO GROWTH — SIGNIFICANT CHANGE UP
EGFR: 113 ML/MIN/1.73M2 — SIGNIFICANT CHANGE UP
GLUCOSE BLDC GLUCOMTR-MCNC: 112 MG/DL — HIGH (ref 70–99)
GLUCOSE BLDC GLUCOMTR-MCNC: 123 MG/DL — HIGH (ref 70–99)
GLUCOSE BLDC GLUCOMTR-MCNC: 140 MG/DL — HIGH (ref 70–99)
GLUCOSE BLDC GLUCOMTR-MCNC: 97 MG/DL — SIGNIFICANT CHANGE UP (ref 70–99)
GLUCOSE SERPL-MCNC: 147 MG/DL — HIGH (ref 70–99)
HCT VFR BLD CALC: 25.5 % — LOW (ref 34.5–45)
HGB BLD-MCNC: 7.1 G/DL — LOW (ref 11.5–15.5)
INR BLD: 1.57 RATIO — HIGH (ref 0.85–1.18)
MAGNESIUM SERPL-MCNC: 1.4 MG/DL — LOW (ref 1.6–2.6)
MCHC RBC-ENTMCNC: 24.5 PG — LOW (ref 27–34)
MCHC RBC-ENTMCNC: 27.8 GM/DL — LOW (ref 32–36)
MCV RBC AUTO: 87.9 FL — SIGNIFICANT CHANGE UP (ref 80–100)
NRBC # BLD: 0 /100 WBCS — SIGNIFICANT CHANGE UP (ref 0–0)
PHOSPHATE SERPL-MCNC: 3.1 MG/DL — SIGNIFICANT CHANGE UP (ref 2.5–4.5)
PLATELET # BLD AUTO: 108 K/UL — LOW (ref 150–400)
POTASSIUM SERPL-MCNC: 3.3 MMOL/L — LOW (ref 3.5–5.3)
POTASSIUM SERPL-SCNC: 3.3 MMOL/L — LOW (ref 3.5–5.3)
PROT SERPL-MCNC: 6.2 G/DL — SIGNIFICANT CHANGE UP (ref 6–8.3)
PROTHROM AB SERPL-ACNC: 17 SEC — HIGH (ref 9.5–13)
RBC # BLD: 2.9 M/UL — LOW (ref 3.8–5.2)
RBC # FLD: 19.6 % — HIGH (ref 10.3–14.5)
SODIUM SERPL-SCNC: 136 MMOL/L — SIGNIFICANT CHANGE UP (ref 135–145)
SPECIMEN SOURCE: SIGNIFICANT CHANGE UP
WBC # BLD: 6.27 K/UL — SIGNIFICANT CHANGE UP (ref 3.8–10.5)
WBC # FLD AUTO: 6.27 K/UL — SIGNIFICANT CHANGE UP (ref 3.8–10.5)

## 2024-04-13 PROCEDURE — 99233 SBSQ HOSP IP/OBS HIGH 50: CPT | Mod: FS

## 2024-04-13 RX ORDER — SODIUM CHLORIDE 9 MG/ML
500 INJECTION, SOLUTION INTRAVENOUS ONCE
Refills: 0 | Status: COMPLETED | OUTPATIENT
Start: 2024-04-13 | End: 2024-04-13

## 2024-04-13 RX ORDER — ACETAMINOPHEN 500 MG
1000 TABLET ORAL ONCE
Refills: 0 | Status: COMPLETED | OUTPATIENT
Start: 2024-04-13 | End: 2024-04-13

## 2024-04-13 RX ORDER — ACETYLCYSTEINE 200 MG/ML
4 VIAL (ML) MISCELLANEOUS EVERY 8 HOURS
Refills: 0 | Status: DISCONTINUED | OUTPATIENT
Start: 2024-04-13 | End: 2024-04-14

## 2024-04-13 RX ORDER — POTASSIUM CHLORIDE 20 MEQ
10 PACKET (EA) ORAL
Refills: 0 | Status: COMPLETED | OUTPATIENT
Start: 2024-04-13 | End: 2024-04-13

## 2024-04-13 RX ORDER — MAGNESIUM SULFATE 500 MG/ML
2 VIAL (ML) INJECTION ONCE
Refills: 0 | Status: COMPLETED | OUTPATIENT
Start: 2024-04-13 | End: 2024-04-13

## 2024-04-13 RX ADMIN — HYDROMORPHONE HYDROCHLORIDE 0.5 MILLIGRAM(S): 2 INJECTION INTRAMUSCULAR; INTRAVENOUS; SUBCUTANEOUS at 02:38

## 2024-04-13 RX ADMIN — SODIUM CHLORIDE 50 MILLILITER(S): 9 INJECTION, SOLUTION INTRAVENOUS at 14:27

## 2024-04-13 RX ADMIN — Medication 3 MILLILITER(S): at 17:28

## 2024-04-13 RX ADMIN — Medication 1 APPLICATION(S): at 21:41

## 2024-04-13 RX ADMIN — CEFEPIME 100 MILLIGRAM(S): 1 INJECTION, POWDER, FOR SOLUTION INTRAMUSCULAR; INTRAVENOUS at 18:27

## 2024-04-13 RX ADMIN — LIDOCAINE 1 PATCH: 4 CREAM TOPICAL at 21:40

## 2024-04-13 RX ADMIN — Medication 100 MILLIEQUIVALENT(S): at 10:03

## 2024-04-13 RX ADMIN — Medication 125 MILLIGRAM(S): at 18:28

## 2024-04-13 RX ADMIN — ONDANSETRON 4 MILLIGRAM(S): 8 TABLET, FILM COATED ORAL at 05:27

## 2024-04-13 RX ADMIN — Medication 100 MILLIEQUIVALENT(S): at 09:24

## 2024-04-13 RX ADMIN — HYDROMORPHONE HYDROCHLORIDE 1 MILLIGRAM(S): 2 INJECTION INTRAMUSCULAR; INTRAVENOUS; SUBCUTANEOUS at 05:48

## 2024-04-13 RX ADMIN — CHLORHEXIDINE GLUCONATE 15 MILLILITER(S): 213 SOLUTION TOPICAL at 18:33

## 2024-04-13 RX ADMIN — Medication 3 MILLILITER(S): at 12:00

## 2024-04-13 RX ADMIN — GABAPENTIN 100 MILLIGRAM(S): 400 CAPSULE ORAL at 21:40

## 2024-04-13 RX ADMIN — Medication 5 MILLILITER(S): at 23:49

## 2024-04-13 RX ADMIN — Medication 125 MILLIGRAM(S): at 23:50

## 2024-04-13 RX ADMIN — PANTOPRAZOLE SODIUM 40 MILLIGRAM(S): 20 TABLET, DELAYED RELEASE ORAL at 18:28

## 2024-04-13 RX ADMIN — SODIUM CHLORIDE 500 MILLILITER(S): 9 INJECTION, SOLUTION INTRAVENOUS at 12:29

## 2024-04-13 RX ADMIN — QUETIAPINE FUMARATE 12.5 MILLIGRAM(S): 200 TABLET, FILM COATED ORAL at 18:32

## 2024-04-13 RX ADMIN — Medication 5 MILLILITER(S): at 11:32

## 2024-04-13 RX ADMIN — Medication 3 MILLILITER(S): at 23:20

## 2024-04-13 RX ADMIN — LIDOCAINE 1 PATCH: 4 CREAM TOPICAL at 12:18

## 2024-04-13 RX ADMIN — Medication 1000 MILLIGRAM(S): at 10:41

## 2024-04-13 RX ADMIN — Medication 100 MILLIEQUIVALENT(S): at 11:08

## 2024-04-13 RX ADMIN — HYDROMORPHONE HYDROCHLORIDE 0.5 MILLIGRAM(S): 2 INJECTION INTRAMUSCULAR; INTRAVENOUS; SUBCUTANEOUS at 02:08

## 2024-04-13 RX ADMIN — CEFEPIME 100 MILLIGRAM(S): 1 INJECTION, POWDER, FOR SOLUTION INTRAMUSCULAR; INTRAVENOUS at 05:27

## 2024-04-13 RX ADMIN — Medication 3 MILLIGRAM(S): at 21:40

## 2024-04-13 RX ADMIN — Medication 1 SPRAY(S): at 05:28

## 2024-04-13 RX ADMIN — Medication 87.5 MICROGRAM(S): at 05:27

## 2024-04-13 RX ADMIN — LIDOCAINE 1 PATCH: 4 CREAM TOPICAL at 12:19

## 2024-04-13 RX ADMIN — Medication 1 APPLICATION(S): at 14:26

## 2024-04-13 RX ADMIN — Medication 5 MILLILITER(S): at 05:28

## 2024-04-13 RX ADMIN — Medication 1 DROP(S): at 23:50

## 2024-04-13 RX ADMIN — LIDOCAINE 1 PATCH: 4 CREAM TOPICAL at 12:17

## 2024-04-13 RX ADMIN — Medication 3 MILLILITER(S): at 05:11

## 2024-04-13 RX ADMIN — Medication 5 MILLILITER(S): at 18:28

## 2024-04-13 RX ADMIN — HYDROMORPHONE HYDROCHLORIDE 1 MILLIGRAM(S): 2 INJECTION INTRAMUSCULAR; INTRAVENOUS; SUBCUTANEOUS at 06:18

## 2024-04-13 RX ADMIN — CHLORHEXIDINE GLUCONATE 1 APPLICATION(S): 213 SOLUTION TOPICAL at 05:28

## 2024-04-13 RX ADMIN — CHLORHEXIDINE GLUCONATE 15 MILLILITER(S): 213 SOLUTION TOPICAL at 05:29

## 2024-04-13 RX ADMIN — Medication 1 DROP(S): at 11:33

## 2024-04-13 RX ADMIN — ESCITALOPRAM OXALATE 10 MILLIGRAM(S): 10 TABLET, FILM COATED ORAL at 18:27

## 2024-04-13 RX ADMIN — INSULIN GLARGINE 5 UNIT(S): 100 INJECTION, SOLUTION SUBCUTANEOUS at 18:10

## 2024-04-13 RX ADMIN — Medication 1 APPLICATION(S): at 05:28

## 2024-04-13 RX ADMIN — Medication 1 DROP(S): at 18:33

## 2024-04-13 RX ADMIN — LIDOCAINE 1 PATCH: 4 CREAM TOPICAL at 21:41

## 2024-04-13 RX ADMIN — Medication 1 DROP(S): at 05:28

## 2024-04-13 RX ADMIN — Medication 25 GRAM(S): at 11:29

## 2024-04-13 RX ADMIN — Medication 400 MILLIGRAM(S): at 09:02

## 2024-04-13 RX ADMIN — SIMETHICONE 80 MILLIGRAM(S): 80 TABLET, CHEWABLE ORAL at 18:28

## 2024-04-13 RX ADMIN — PANTOPRAZOLE SODIUM 40 MILLIGRAM(S): 20 TABLET, DELAYED RELEASE ORAL at 05:27

## 2024-04-13 NOTE — PROGRESS NOTE ADULT - PROBLEM SELECTOR PLAN 1
PEG tube dislodgement on admission   - S/p CT abd/pelvis showing dislodgement of G tube with balloon in the anterior abdominal wall with air filled track to stomach  - initially GI and Surgery consulted   - S/p bedside exchange by IR on 4/3- # 20 Fr Kangaroo EnFit placed   - 4/4: PEG leaking.  Feeds held.  IR adjusted PEG at bedside. G-tube study pending to exclude extravasation. Patient remains NPO except for meds, on IVF, LR at 50ml/hr.  - 4/5: PEG still leaking, adjusted at bedside by IR Attending.  - 4/6: PEG still leaking, IR informed, planned for PEG revision on 4/8 or 4/9  - 4/8-4/9 IR has aborted procedure - GI Dr. Cleaning has been brought onto case for new PEG  - 4/9 PEG fell out spontaneously, Dr. Cleaning to replace today - plan is for eventual new PEG when cultures clear, ?possible temporary NGT upon removal of PEG to allow for closure of PEG site  -4/12: Mcbride replaced with 18Fr PEG at bedside, bumper at 8cm (intentionally loose).  Will assess PEG site/tract wit CT abd, will not use until cleared by GI. PEG tube dislodgement on admission   - S/p CT abd/pelvis showing dislodgement of G tube with balloon in the anterior abdominal wall with air filled track to stomach  - initially GI and Surgery consulted   - S/p bedside exchange by IR on 4/3- # 20 Fr Kangaroo EnFit placed   - 4/4: PEG leaking.  Feeds held.  IR adjusted PEG at bedside. G-tube study pending to exclude extravasation. Patient remains NPO except for meds, on IVF, LR at 50ml/hr.  - 4/5: PEG still leaking, adjusted at bedside by IR Attending.  - 4/6: PEG still leaking, IR informed, planned for PEG revision on 4/8 or 4/9  - 4/8-4/9 IR has aborted procedure - GI Dr. Cleaning has been brought onto case for new PEG  - 4/9 PEG fell out spontaneously, emerson placed into tract - assessed by Dr. Cleaning  - 4/12: Emerson replaced with 18Fr PEG at bedside, bumper at 8cm (intentionally loose) - CT A/P done   - 4/13 PEG ok to meds

## 2024-04-13 NOTE — PROGRESS NOTE ADULT - NS ATTEND AMEND GEN_ALL_CORE FT
Pt is a 74 year old female with diabetes, hypertension, hyperlipidemia, hypothyroidism, RA on Prednisone, fibromyalgia, cerebral aneurysm s/p repair, chronic hypercapnic respiratory failure s/p trach (vent dependent) and PEG, recurrent C.diff infection (follows with Dr. Newman) who presented with dislodged PEG admitted to [facility] on 4/2/24 for further care.    #neuro  awake, interactive, follows commands  No acute neurologic issues    #resp  full vent support  monitor for hemoptysis - no katie hemoptysis at this time, continue airway clearance therapy as per routine  on VC/AC - normal pH. Will wean as tolerated  Trach care and suctioning as per [facility] team.  Aspiration precautions and oral hygiene in place      #Dislodged PEG  PEG again dislodged on 4/9, emerson in place. Appreciate IR input. GI and surgery consulted. Plan for joint intervention with GI and Surgery - closure and GJ tube placement.   CT A/P performed, f/u with GI today if able to feed. Pt remains NPO.    #ID  Pt febrile to 102 on 4/7 and again febrile to 102 on 4/11. Blood cultures showing serratia 4/7. Repeat blood cultures from 4/9 ntd. Switched to cefepime on 4/11 per ID recommendations. Repeat BCx on 4/11 ntd. CXR AND lactate normal. CT A/P on 4/2 without source of infection. ID consulted. Abx completed.   Completed  fidaxomicin for total 10 day course, restarted vanco po 125 mg qid as suppression as she is back on antibiotics with plan for 5 more days past completion of Abx.     #Renal   stable Cr  No acute renal issues    #ophthalmology   conjunctival erythema - no suspicion for endophthalmitis, start erythromycin gtt, artificial tears, they will monito    Dispo pending medical optimization. Pt full code. DVT ppx in place. Discussed at bedside with pt's family today.

## 2024-04-13 NOTE — PROGRESS NOTE ADULT - PROBLEM SELECTOR PLAN 2
Being treated for C diff as outpatient with follow up with Infectious disease, Dr Newman   - completed fidaxomicin (total 10 day course) 4/3 -->4/13  - ID recommends ZinPlava 50mg IV to be given to reduce Cdiff infections (is not carried in hospital)  - continue with PO vanco (started 4/8) until completion of Cefepime on 4/13  - Infectious diease consult appreciated

## 2024-04-13 NOTE — PROGRESS NOTE ADULT - ASSESSMENT
73 yo F PMH T2DM on insulin, HTN, HLD, hypothyroidism, RA on Prednisone, Fibromyalgia, cerebral aneurysm s/p repair, chronic hypercapnic respiratory failure s/p trach (vent dependent) and Chronic PEG 2022 , recurrent C.diff infection (follows with Dr. Newman) , bedbound who presented from home with complaints of possible G tube dislodgement and redness around the site. Had CT Abdomen/Pelvis done showing dislodgement of G tube with balloon in the anterior abdominal wall with air filled track to stomach. Admitted to MICU for ventilator management and given vancomycin/meropenem empirically for treatment of cellulitis. Per family patient has had Known c diff infection for past 2-3 weeks.  Treating with Fidaxomicin.  IR team exchanged PEG on 4/3 however later in the day it remained with leakage.  IR Attending adjusted PEG at bedside on 4/4 and PEG remained with moderate amount of PEG leakage once feeds initiated at reduced rate.  GI team re-consulted as second opinion for PEG management.  On 4/9 PEG was found out of place, a emerson catheter was placed in the stoma to maintain patency.  Patient was planned for PEG revision with both Surgery and GI team involved on 4/11 however patient had fevers up to 102F and procedure was cancelled for infectious work-up.  Antibiotics then switched from Meropenem to Cefepime.  An 18 Fr PEG tube placed at bedside on 4/12 (original PEG size was 20Fr) as stoma site appears to have closed.      4/9:  IR was called yesterday for an explanation/plan for procedure (for our team and for pts daughter) - we were informed someone would be bedside later in evening rounds.  This am IR called again to discuss plan for procedure scheduled for today 4/9.  Pt was still on schedule this AM, was informed that someone from IR team will come to bedside to evaluate/discuss plan/consent with daughter prior to procedure.  Daughter frustrated, case discussed between daughter/IR PA.  IR PA Lesley discussed plan with her team, case was cancelled for today, GI brought on board as per pts daughters request for second opinion.  GI - Dr. Cleaning now involved in case, plan for possible new PEG with GI.  Pt now with positive blood cultures - serratia, awaiting sensitivities - will need to hold off on new PEG until bacteremia clears.  PEG then fell out and was found in bed, Dr. Cleaning called, emerson placed in track, Dr. Cleaning to replace PEG today.  Possible temporary NGT but needs placement with xrays to confirm no extravasation at PEG site.  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  ID - Dr. Javed, continue meropenem until sensitivities result, aware daughter is concerned about the thrombocytopenia which she contributes to antibiotics - continue current therapy as per Dr. Javed.  Addressed Zinvaya with Dr. Javed (initially spoken about between Dr. Newman and Daughter), spoke with pharmacy, hospital does not carry that medication.  Continue vanco PO until meropenem course completed - ok for missed doses because of PEG complications.  Midline placed.  Daughter Marysol aware.      4/10:  No acute events.  Patient remains NPO as awaiting plan in regards to PEG replacement.  Also awaiting negative blood cultures.  Remains on Meropenem.  4/11:  Antibiotics switched to Cefepime as per ID.  Patient had fever of 102.7F which prevented patient from having PEG revision today.  Patient complained of feeling like  "I can't breathe", tidal volume increased from 300 to 350 with resolution of symptoms.  ABG (venous specimen) collected which was appropriate to continue current ventilator settings.  Patient also complaining of burning pain in stomach, likely gastritis/GERD, increased protonix to BID.  4/12: 18Fr PEG placed in stomach at bedside.  CT chest/abd/pelv w/ contrast as part of infectious work-up and also to assess PEG tract.  Will f/u with GI once CT done to determine if PEG can be used or not.  Remains in Cefepime. 75 yo F PMH T2DM on insulin, HTN, HLD, hypothyroidism, RA on Prednisone, Fibromyalgia, cerebral aneurysm s/p repair, chronic hypercapnic respiratory failure s/p trach (vent dependent) and Chronic PEG 2022 , recurrent C.diff infection (follows with Dr. Newman) , bedbound who presented from home with complaints of possible G tube dislodgement and redness around the site. Had CT Abdomen/Pelvis done showing dislodgement of G tube with balloon in the anterior abdominal wall with air filled track to stomach. Admitted to MICU for ventilator management and given vancomycin/meropenem empirically for treatment of cellulitis. Per family patient has had Known c diff infection for past 2-3 weeks.  Treating with Fidaxomicin.  IR team exchanged PEG on 4/3 however later in the day it remained with leakage.  IR Attending adjusted PEG at bedside on 4/4 and PEG remained with moderate amount of PEG leakage once feeds initiated at reduced rate.  GI team re-consulted as second opinion for PEG management.  On 4/9 PEG was found out of place, a emerson catheter was placed in the stoma to maintain patency.  Patient was planned for PEG revision with both Surgery and GI team involved on 4/11 however patient had fevers up to 102F and procedure was cancelled for infectious work-up.  Antibiotics then switched from Meropenem to Cefepime.  An 18 Fr PEG tube placed at bedside on 4/12 (original PEG size was 20Fr) as stoma site appears to have closed.      4/13: 75 yo F PMH T2DM on insulin, HTN, HLD, hypothyroidism, RA on Prednisone, Fibromyalgia, cerebral aneurysm s/p repair, chronic hypercapnic respiratory failure s/p trach (vent dependent) and Chronic PEG 2022 , recurrent C.diff infection (follows with Dr. Newman) , bedbound who presented from home with complaints of possible G tube dislodgement and redness around the site. Had CT Abdomen/Pelvis done showing dislodgement of G tube with balloon in the anterior abdominal wall with air filled track to stomach. Admitted to MICU for ventilator management and given vancomycin/meropenem empirically for treatment of cellulitis. Per family patient has had Known c diff infection for past 2-3 weeks.  Treating with Fidaxomicin.  IR team exchanged PEG on 4/3 however later in the day it remained with leakage.  IR Attending adjusted PEG at bedside on 4/4 and PEG remained with moderate amount of PEG leakage once feeds initiated at reduced rate.  GI team re-consulted as second opinion for PEG management.  On 4/9 PEG was found out of place, a emerson catheter was placed in the stoma to maintain patency.  Patient was planned for PEG revision with both Surgery and GI team involved on 4/11 however patient had fevers up to 102F and procedure was cancelled for infectious work-up.  Antibiotics then switched from Meropenem to Cefepime.  An 18 Fr PEG tube placed at bedside on 4/12 (original PEG size was 20Fr) as stoma site appears to have closed.      4/13:  Low grade temp this am, given IV tylenol.  CT C/A/P with multifocal pneumonia.  Blood cultures, urine cultures negative.  Continuing cefepime.  Low BP, resolved with 500mL LR bolus.  GI cleared PEG for meds only.

## 2024-04-13 NOTE — PROGRESS NOTE ADULT - SUBJECTIVE AND OBJECTIVE BOX
Patient is a 74y old  Female who presents with a chief complaint of Dislodged G Tube (2024 08:39)      Interval Events:    REVIEW OF SYSTEMS:  [ ] Positive  [ ] All other systems negative  [ ] Unable to assess ROS because ________    Vital Signs Last 24 Hrs  T(C): 37.8 (24 @ 06:24), Max: 38.3 (24 @ 10:07)  T(F): 100.1 (24 @ 06:24), Max: 100.9 (24 @ 10:07)  HR: 116 (24 @ 06:24) (80 - 116)  BP: 148/48 (24 @ 05:42) (110/58 - 162/63)  RR: 19 (24 @ 05:42) (12 - 21)  SpO2: 100% (24 @ 05:42) (98% - 100%)    PHYSICAL EXAM:  HEENT:   [ ]Tracheostomy:  [ ]Pupils equal  [ ]No oral lesions  [ ]Abnormal    SKIN  [ ]No Rash  [ ] Abnormal  [ ] pressure    CARDIAC  [ ]Regular  [ ]Abnormal    PULMONARY  [ ]Bilateral Clear Breath Sounds  [ ]Normal Excursion  [ ]Abnormal    GI  [ ]PEG      [ ] +BS		              [ ]Soft, nondistended, nontender	  [ ]Abnormal    MUSCULOSKELETAL                                   [ ]Bedbound                 [ ]Abnormal    [ ]Ambulatory/OOB to chair                           EXTREMITIES                                         [ ]Normal  [ ]Edema                           NEUROLOGIC  [ ] Normal, non focal  [ ] Focal findings:    PSYCHIATRIC  [ ]Alert and appropriate  [ ] Sedated	 [ ]Agitated    :  Mcbride: [ ] Yes, if yes: Date of Placement:                   [  ] No    LINES: Central Lines [ ] Yes, if yes: Date of Placement                                     [  ] No    HOSPITAL MEDICATIONS:  MEDICATIONS  (STANDING):  albuterol/ipratropium for Nebulization 3 milliLiter(s) Nebulizer every 6 hours  amLODIPine   Tablet 10 milliGRAM(s) Oral <User Schedule>  artificial tears (preservative free) Ophthalmic Solution 1 Drop(s) Both EYES four times a day  ascorbic acid 500 milliGRAM(s) Oral daily  Biotene Dry Mouth Oral Rinse 5 milliLiter(s) Swish and Spit every 6 hours  cefepime   IVPB 1000 milliGRAM(s) IV Intermittent every 12 hours  cefepime   IVPB      chlorhexidine 0.12% Liquid 15 milliLiter(s) Oral Mucosa every 12 hours  chlorhexidine 4% Liquid 1 Application(s) Topical <User Schedule>  erythromycin   Ointment 1 Application(s) Both EYES three times a day  escitalopram 10 milliGRAM(s) Oral <User Schedule>  fentaNYL   Patch  25 MICROgram(s)/Hr 1 Patch Transdermal every 72 hours  gabapentin Solution 100 milliGRAM(s) Oral <User Schedule>  insulin glargine Injectable (LANTUS) 5 Unit(s) SubCutaneous <User Schedule>  insulin lispro (ADMELOG) corrective regimen sliding scale   SubCutaneous every 6 hours  lactated ringers. 1000 milliLiter(s) (50 mL/Hr) IV Continuous <Continuous>  levothyroxine Injectable 87.5 MICROGram(s) IV Push <User Schedule>  lidocaine   4% Patch 1 Patch Transdermal at bedtime  lidocaine   4% Patch 1 Patch Transdermal at bedtime  melatonin Liquid 3 milliGRAM(s) Oral at bedtime  multivitamin/minerals/iron Oral Solution (CENTRUM) 15 milliLiter(s) Oral daily  pantoprazole  Injectable 40 milliGRAM(s) IV Push every 12 hours  predniSONE  Solution 5 milliGRAM(s) Oral <User Schedule>  QUEtiapine 12.5 milliGRAM(s) Oral <User Schedule>  simethicone 80 milliGRAM(s) Chew every 12 hours  vancomycin    Solution 125 milliGRAM(s) Oral every 6 hours    MEDICATIONS  (PRN):  calamine/zinc oxide Lotion 1 Application(s) Topical daily PRN Rash and/or Itching  HYDROmorphone  Injectable 0.5 milliGRAM(s) IV Push every 4 hours PRN Moderate Pain (4 - 6)  HYDROmorphone  Injectable 1 milliGRAM(s) IV Push every 6 hours PRN Severe Pain (7 - 10)  ondansetron Injectable 4 milliGRAM(s) IV Push every 8 hours PRN Nausea and/or Vomiting  sodium chloride 0.65% Nasal 1 Spray(s) Both Nostrils every 12 hours PRN Congestion      LABS:                        7.6    8.36  )-----------( 111      ( 2024 06:36 )             27.5     04-12    137  |  99  |  5<L>  ----------------------------<  123<H>  3.6   |  21<L>  |  <0.30<L>    Ca    8.7      2024 06:36  Phos  3.1     04-12  Mg     1.6     04-12    TPro  6.1  /  Alb  2.7<L>  /  TBili  0.8  /  DBili  x   /  AST  19  /  ALT  17  /  AlkPhos  44  04-12    PT/INR - ( 2024 06:36 )   PT: 16.7 sec;   INR: 1.54 ratio         PTT - ( 2024 06:36 )  PTT:33.7 sec  Urinalysis Basic - ( 2024 13:30 )    Color: Yellow / Appearance: Clear / S.012 / pH: x  Gluc: x / Ketone: >=160 mg/dL  / Bili: Negative / Urobili: 0.2 mg/dL   Blood: x / Protein: Trace mg/dL / Nitrite: Negative   Leuk Esterase: Negative / RBC: x / WBC x   Sq Epi: x / Non Sq Epi: x / Bacteria: x      Arterial Blood Gas:   @ 15:10  7.37/40/47/23/80.4/-2.0  ABG lactate: --      CAPILLARY BLOOD GLUCOSE    MICROBIOLOGY:     RADIOLOGY:  [ ] Reviewed and interpreted by me    Mode: AC/ CMV (Assist Control/ Continuous Mandatory Ventilation)  RR (machine): 12  TV (machine): 350  FiO2: 30  PEEP: 5  ITime: 0.1  MAP: 9  PIP: 26   Patient is a 74y old  Female who presents with a chief complaint of Dislodged G Tube (2024 08:39)      Interval Events:  No acute events overnight.     REVIEW OF SYSTEMS:  [ ] Positive  [X] All other systems negative  [ ] Unable to assess ROS because ________    Vital Signs Last 24 Hrs  T(C): 37.8 (24 @ 06:24), Max: 38.3 (24 @ 10:07)  T(F): 100.1 (24 @ 06:24), Max: 100.9 (24 @ 10:07)  HR: 116 (24 @ 06:24) (80 - 116)  BP: 148/48 (24 @ 05:42) (110/58 - 162/63)  RR: 19 (24 @ 05:42) (12 - 21)  SpO2: 100% (24 @ 05:42) (98% - 100%)    PHYSICAL EXAM:  HEENT:   [X]Tracheostomy: #8 distal XLT cuffed shiley   [X]Pupils equal  [ ]No oral lesions  [ ]Abnormal    SKIN  [ ]No Rash  [ ] Abnormal  [X] pressure - sacral DTI    CARDIAC  [ ]Regular  [X]Abnormal - tachycardia    PULMONARY  [X]Bilateral Clear Breath Sounds  [ ]Normal Excursion  [ ]Abnormal    GI  [X]PEG site c/d/i, no redness - still with clear mucousy leakage     [X] +BS		              [X]Soft, nondistended, nontender	  [X]Abnormal - rectal tube in place     MUSCULOSKELETAL                                   [X]Bedbound                 [ ]Abnormal    [ ]Ambulatory/OOB to chair                           EXTREMITIES                                         [X]Normal  [ ]Edema                           NEUROLOGIC  [ ] Normal, non focal  [X] Focal findings: awake and alert, follows commands on all extremities    PSYCHIATRIC  [X]Alert and appropriate  [ ] Sedated	 [ ]Agitated    :  Mcbride: [ ] Yes, if yes: Date of Placement:                   [X] No    LINES: Central Lines [ ] Yes, if yes: Date of Placement                                     [X] No    HOSPITAL MEDICATIONS:  MEDICATIONS  (STANDING):  albuterol/ipratropium for Nebulization 3 milliLiter(s) Nebulizer every 6 hours  amLODIPine   Tablet 10 milliGRAM(s) Oral <User Schedule>  artificial tears (preservative free) Ophthalmic Solution 1 Drop(s) Both EYES four times a day  ascorbic acid 500 milliGRAM(s) Oral daily  Biotene Dry Mouth Oral Rinse 5 milliLiter(s) Swish and Spit every 6 hours  cefepime   IVPB 1000 milliGRAM(s) IV Intermittent every 12 hours  cefepime   IVPB      chlorhexidine 0.12% Liquid 15 milliLiter(s) Oral Mucosa every 12 hours  chlorhexidine 4% Liquid 1 Application(s) Topical <User Schedule>  erythromycin   Ointment 1 Application(s) Both EYES three times a day  escitalopram 10 milliGRAM(s) Oral <User Schedule>  fentaNYL   Patch  25 MICROgram(s)/Hr 1 Patch Transdermal every 72 hours  gabapentin Solution 100 milliGRAM(s) Oral <User Schedule>  insulin glargine Injectable (LANTUS) 5 Unit(s) SubCutaneous <User Schedule>  insulin lispro (ADMELOG) corrective regimen sliding scale   SubCutaneous every 6 hours  lactated ringers. 1000 milliLiter(s) (50 mL/Hr) IV Continuous <Continuous>  levothyroxine Injectable 87.5 MICROGram(s) IV Push <User Schedule>  lidocaine   4% Patch 1 Patch Transdermal at bedtime  lidocaine   4% Patch 1 Patch Transdermal at bedtime  melatonin Liquid 3 milliGRAM(s) Oral at bedtime  multivitamin/minerals/iron Oral Solution (CENTRUM) 15 milliLiter(s) Oral daily  pantoprazole  Injectable 40 milliGRAM(s) IV Push every 12 hours  predniSONE  Solution 5 milliGRAM(s) Oral <User Schedule>  QUEtiapine 12.5 milliGRAM(s) Oral <User Schedule>  simethicone 80 milliGRAM(s) Chew every 12 hours  vancomycin    Solution 125 milliGRAM(s) Oral every 6 hours    MEDICATIONS  (PRN):  calamine/zinc oxide Lotion 1 Application(s) Topical daily PRN Rash and/or Itching  HYDROmorphone  Injectable 0.5 milliGRAM(s) IV Push every 4 hours PRN Moderate Pain (4 - 6)  HYDROmorphone  Injectable 1 milliGRAM(s) IV Push every 6 hours PRN Severe Pain (7 - 10)  ondansetron Injectable 4 milliGRAM(s) IV Push every 8 hours PRN Nausea and/or Vomiting  sodium chloride 0.65% Nasal 1 Spray(s) Both Nostrils every 12 hours PRN Congestion    LABS:                        7.6    8.36  )-----------( 111      ( 2024 06:36 )             27.5     04-12    137  |  99  |  5<L>  ----------------------------<  123<H>  3.6   |  21<L>  |  <0.30<L>    Ca    8.7      2024 06:36  Phos  3.1     04-12  Mg     1.6     04-12    TPro  6.1  /  Alb  2.7<L>  /  TBili  0.8  /  DBili  x   /  AST  19  /  ALT  17  /  AlkPhos  44  04-12    PT/INR - ( 2024 06:36 )   PT: 16.7 sec;   INR: 1.54 ratio         PTT - ( 2024 06:36 )  PTT:33.7 sec  Urinalysis Basic - ( 2024 13:30 )    Color: Yellow / Appearance: Clear / S.012 / pH: x  Gluc: x / Ketone: >=160 mg/dL  / Bili: Negative / Urobili: 0.2 mg/dL   Blood: x / Protein: Trace mg/dL / Nitrite: Negative   Leuk Esterase: Negative / RBC: x / WBC x   Sq Epi: x / Non Sq Epi: x / Bacteria: x    Arterial Blood Gas:   @ 15:10  7.37/40/47/23/80.4/-2.0  ABG lactate: --    CAPILLARY BLOOD GLUCOSE    MICROBIOLOGY:     RADIOLOGY:  [ ] Reviewed and interpreted by me    Mode: AC/ CMV (Assist Control/ Continuous Mandatory Ventilation)  RR (machine): 12  TV (machine): 350  FiO2: 30  PEEP: 5  ITime: 0.1  MAP: 9  PIP: 26

## 2024-04-13 NOTE — PROGRESS NOTE ADULT - SUBJECTIVE AND OBJECTIVE BOX
Chief Complaint:  Patient is a 74y old  Female who presents with a chief complaint of Dislodged G Tube (13 Apr 2024 07:40)      Date of service 04-13-24        Interval Events:   no new events           albuterol/ipratropium for Nebulization 3 milliLiter(s) Nebulizer every 6 hours  amLODIPine   Tablet 10 milliGRAM(s) Oral <User Schedule>  artificial tears (preservative free) Ophthalmic Solution 1 Drop(s) Both EYES four times a day  ascorbic acid 500 milliGRAM(s) Oral daily  Biotene Dry Mouth Oral Rinse 5 milliLiter(s) Swish and Spit every 6 hours  calamine/zinc oxide Lotion 1 Application(s) Topical daily PRN  cefepime   IVPB 1000 milliGRAM(s) IV Intermittent every 12 hours  cefepime   IVPB      chlorhexidine 0.12% Liquid 15 milliLiter(s) Oral Mucosa every 12 hours  chlorhexidine 4% Liquid 1 Application(s) Topical <User Schedule>  erythromycin   Ointment 1 Application(s) Both EYES three times a day  escitalopram 10 milliGRAM(s) Oral <User Schedule>  fentaNYL   Patch  25 MICROgram(s)/Hr 1 Patch Transdermal every 72 hours  gabapentin Solution 100 milliGRAM(s) Oral <User Schedule>  HYDROmorphone  Injectable 0.5 milliGRAM(s) IV Push every 4 hours PRN  HYDROmorphone  Injectable 1 milliGRAM(s) IV Push every 6 hours PRN  insulin glargine Injectable (LANTUS) 5 Unit(s) SubCutaneous <User Schedule>  insulin lispro (ADMELOG) corrective regimen sliding scale   SubCutaneous every 6 hours  lactated ringers. 1000 milliLiter(s) IV Continuous <Continuous>  levothyroxine Injectable 87.5 MICROGram(s) IV Push <User Schedule>  lidocaine   4% Patch 1 Patch Transdermal at bedtime  lidocaine   4% Patch 1 Patch Transdermal at bedtime  melatonin Liquid 3 milliGRAM(s) Oral at bedtime  multivitamin/minerals/iron Oral Solution (CENTRUM) 15 milliLiter(s) Oral daily  ondansetron Injectable 4 milliGRAM(s) IV Push every 8 hours PRN  pantoprazole  Injectable 40 milliGRAM(s) IV Push every 12 hours  predniSONE  Solution 5 milliGRAM(s) Oral <User Schedule>  QUEtiapine 12.5 milliGRAM(s) Oral <User Schedule>  simethicone 80 milliGRAM(s) Chew every 12 hours  sodium chloride 0.65% Nasal 1 Spray(s) Both Nostrils every 12 hours PRN  vancomycin    Solution 125 milliGRAM(s) Oral every 6 hours                            7.6    8.36  )-----------( 111      ( 12 Apr 2024 06:36 )             27.5       Hemoglobin: 7.6 g/dL (04-12 @ 06:36)  Hemoglobin: 8.8 g/dL (04-11 @ 16:06)  Hemoglobin: 8.4 g/dL (04-10 @ 09:19)  Hemoglobin: 7.2 g/dL (04-09 @ 01:10)      04-13    136  |  102  |  <4<L>  ----------------------------<  147<H>  3.3<L>   |  17<L>  |  <0.30<L>    Ca    9.0      13 Apr 2024 07:08  Phos  3.1     04-13  Mg     1.4     04-13    TPro  6.2  /  Alb  2.5<L>  /  TBili  0.9  /  DBili  x   /  AST  23  /  ALT  21  /  AlkPhos  47  04-13    Creatinine Trend: <0.30<--, <0.30<--, <0.30<--, <0.30<--, 0.17<--, <0.30<--    COAGS: PT/INR - ( 13 Apr 2024 07:23 )   PT: 17.0 sec;   INR: 1.57 ratio         PTT - ( 13 Apr 2024 07:23 )  PTT:34.4 sec          T(C): 37.6 (04-13-24 @ 11:41), Max: 38.2 (04-13-24 @ 08:46)  HR: 96 (04-13-24 @ 11:41) (80 - 132)  BP: 90/43 (04-13-24 @ 11:41) (90/43 - 169/92)  RR: 13 (04-13-24 @ 11:41) (12 - 19)  SpO2: 100% (04-13-24 @ 11:41) (98% - 100%)  Wt(kg): --    I&O's Summary    12 Apr 2024 07:01  -  13 Apr 2024 07:00  --------------------------------------------------------  IN: 1000 mL / OUT: 1400 mL / NET: -400 mL    13 Apr 2024 07:01  -  13 Apr 2024 12:01  --------------------------------------------------------  IN: 350 mL / OUT: 0 mL / NET: 350 mL      Review of Systems:  General:  No wt loss, fevers, chills, night sweats, fatigue,   Eyes:  Good vision, no reported pain  ENT:  No sore throat, pain, runny nose, dysphagia  CV:  No pain, palpitations, hypo/hypertension  Resp:  No dyspnea, cough, tachypnea, wheezing  GI:  See HPI  :  No pain, bleeding, incontinence, nocturia  Muscle:  No pain, weakness  Neuro:  No weakness, tingling, memory problems  Psych:  No fatigue, insomnia, mood problems, depression  Endocrine:  No polyuria, polydipsia, cold/heat intolerance  Heme:  No petechiae, ecchymosis, easy bruisability  Integumentary:  No rash, edema       PHYSICAL EXAM:     GENERAL:  Appears stated age, well-groomed, well-nourished, no distress  HEENT:  NC/AT,  conjunctivae anicteric, clear and pink,   NECK: supple, trachea midline  CHEST:  Full & symmetric excursion, no increased effort, breath sounds clear  HEART:  Regular rhythm, no JVD  ABDOMEN:  Soft, non-tender, non-distended, normoactive bowel sounds,  no masses , no hepatosplenomegaly  EXTREMITIES:  no cyanosis,clubbing or edema  SKIN:  No rash, erythema, or, ecchymoses, no jaundice  NEURO:  Alert, non-focal, no asterixis  PSYCH: Appropriate affect, oriented to place and time  RECTAL: Deferred

## 2024-04-14 LAB
ALBUMIN SERPL ELPH-MCNC: 2.3 G/DL — LOW (ref 3.3–5)
ALP SERPL-CCNC: 44 U/L — SIGNIFICANT CHANGE UP (ref 40–120)
ALT FLD-CCNC: 19 U/L — SIGNIFICANT CHANGE UP (ref 10–45)
ANION GAP SERPL CALC-SCNC: 16 MMOL/L — SIGNIFICANT CHANGE UP (ref 5–17)
APTT BLD: 36.4 SEC — HIGH (ref 24.5–35.6)
AST SERPL-CCNC: 26 U/L — SIGNIFICANT CHANGE UP (ref 10–40)
BILIRUB SERPL-MCNC: 2 MG/DL — HIGH (ref 0.2–1.2)
BUN SERPL-MCNC: <4 MG/DL — LOW (ref 7–23)
CALCIUM SERPL-MCNC: 9.7 MG/DL — SIGNIFICANT CHANGE UP (ref 8.4–10.5)
CHLORIDE SERPL-SCNC: 103 MMOL/L — SIGNIFICANT CHANGE UP (ref 96–108)
CO2 SERPL-SCNC: 18 MMOL/L — LOW (ref 22–31)
CREAT SERPL-MCNC: <0.3 MG/DL — LOW (ref 0.5–1.3)
CULTURE RESULTS: SIGNIFICANT CHANGE UP
CULTURE RESULTS: SIGNIFICANT CHANGE UP
EGFR: 113 ML/MIN/1.73M2 — SIGNIFICANT CHANGE UP
GLUCOSE BLDC GLUCOMTR-MCNC: 102 MG/DL — HIGH (ref 70–99)
GLUCOSE BLDC GLUCOMTR-MCNC: 128 MG/DL — HIGH (ref 70–99)
GLUCOSE BLDC GLUCOMTR-MCNC: 138 MG/DL — HIGH (ref 70–99)
GLUCOSE BLDC GLUCOMTR-MCNC: 155 MG/DL — HIGH (ref 70–99)
GLUCOSE SERPL-MCNC: 106 MG/DL — HIGH (ref 70–99)
HCT VFR BLD CALC: 25.2 % — LOW (ref 34.5–45)
HGB BLD-MCNC: 7.1 G/DL — LOW (ref 11.5–15.5)
INR BLD: 1.82 RATIO — HIGH (ref 0.85–1.18)
MAGNESIUM SERPL-MCNC: 1.7 MG/DL — SIGNIFICANT CHANGE UP (ref 1.6–2.6)
MCHC RBC-ENTMCNC: 24.6 PG — LOW (ref 27–34)
MCHC RBC-ENTMCNC: 28.2 GM/DL — LOW (ref 32–36)
MCV RBC AUTO: 87.2 FL — SIGNIFICANT CHANGE UP (ref 80–100)
NRBC # BLD: 0 /100 WBCS — SIGNIFICANT CHANGE UP (ref 0–0)
PHOSPHATE SERPL-MCNC: 3.1 MG/DL — SIGNIFICANT CHANGE UP (ref 2.5–4.5)
PLATELET # BLD AUTO: 104 K/UL — LOW (ref 150–400)
POTASSIUM SERPL-MCNC: 3.2 MMOL/L — LOW (ref 3.5–5.3)
POTASSIUM SERPL-SCNC: 3.2 MMOL/L — LOW (ref 3.5–5.3)
PROT SERPL-MCNC: 5.5 G/DL — LOW (ref 6–8.3)
PROTHROM AB SERPL-ACNC: 18.8 SEC — HIGH (ref 9.5–13)
RBC # BLD: 2.89 M/UL — LOW (ref 3.8–5.2)
RBC # FLD: 19.4 % — HIGH (ref 10.3–14.5)
SODIUM SERPL-SCNC: 137 MMOL/L — SIGNIFICANT CHANGE UP (ref 135–145)
SPECIMEN SOURCE: SIGNIFICANT CHANGE UP
SPECIMEN SOURCE: SIGNIFICANT CHANGE UP
WBC # BLD: 5.19 K/UL — SIGNIFICANT CHANGE UP (ref 3.8–10.5)
WBC # FLD AUTO: 5.19 K/UL — SIGNIFICANT CHANGE UP (ref 3.8–10.5)

## 2024-04-14 PROCEDURE — 99233 SBSQ HOSP IP/OBS HIGH 50: CPT | Mod: FS

## 2024-04-14 PROCEDURE — 99232 SBSQ HOSP IP/OBS MODERATE 35: CPT

## 2024-04-14 RX ORDER — VANCOMYCIN HCL 1 G
125 VIAL (EA) INTRAVENOUS EVERY 12 HOURS
Refills: 0 | Status: DISCONTINUED | OUTPATIENT
Start: 2024-04-14 | End: 2024-04-17

## 2024-04-14 RX ORDER — ACETYLCYSTEINE 200 MG/ML
4 VIAL (ML) MISCELLANEOUS EVERY 12 HOURS
Refills: 0 | Status: DISCONTINUED | OUTPATIENT
Start: 2024-04-14 | End: 2024-04-15

## 2024-04-14 RX ORDER — ACETAMINOPHEN 500 MG
650 TABLET ORAL ONCE
Refills: 0 | Status: COMPLETED | OUTPATIENT
Start: 2024-04-14 | End: 2024-04-14

## 2024-04-14 RX ORDER — MAGNESIUM SULFATE 500 MG/ML
2 VIAL (ML) INJECTION ONCE
Refills: 0 | Status: COMPLETED | OUTPATIENT
Start: 2024-04-14 | End: 2024-04-14

## 2024-04-14 RX ORDER — POTASSIUM CHLORIDE 20 MEQ
10 PACKET (EA) ORAL
Refills: 0 | Status: COMPLETED | OUTPATIENT
Start: 2024-04-14 | End: 2024-04-14

## 2024-04-14 RX ADMIN — LIDOCAINE 1 PATCH: 4 CREAM TOPICAL at 22:02

## 2024-04-14 RX ADMIN — Medication 100 MILLIEQUIVALENT(S): at 11:46

## 2024-04-14 RX ADMIN — Medication 1: at 18:24

## 2024-04-14 RX ADMIN — INSULIN GLARGINE 5 UNIT(S): 100 INJECTION, SOLUTION SUBCUTANEOUS at 18:23

## 2024-04-14 RX ADMIN — ESCITALOPRAM OXALATE 10 MILLIGRAM(S): 10 TABLET, FILM COATED ORAL at 18:23

## 2024-04-14 RX ADMIN — Medication 5 MILLILITER(S): at 11:44

## 2024-04-14 RX ADMIN — Medication 5 MILLIGRAM(S): at 10:59

## 2024-04-14 RX ADMIN — Medication 1 APPLICATION(S): at 22:03

## 2024-04-14 RX ADMIN — Medication 1 DROP(S): at 05:24

## 2024-04-14 RX ADMIN — Medication 125 MILLIGRAM(S): at 18:20

## 2024-04-14 RX ADMIN — Medication 3 MILLIGRAM(S): at 22:02

## 2024-04-14 RX ADMIN — CHLORHEXIDINE GLUCONATE 1 APPLICATION(S): 213 SOLUTION TOPICAL at 05:24

## 2024-04-14 RX ADMIN — Medication 4 MILLILITER(S): at 05:38

## 2024-04-14 RX ADMIN — GABAPENTIN 100 MILLIGRAM(S): 400 CAPSULE ORAL at 10:35

## 2024-04-14 RX ADMIN — CEFEPIME 100 MILLIGRAM(S): 1 INJECTION, POWDER, FOR SOLUTION INTRAMUSCULAR; INTRAVENOUS at 05:22

## 2024-04-14 RX ADMIN — Medication 87.5 MICROGRAM(S): at 05:22

## 2024-04-14 RX ADMIN — Medication 3 MILLILITER(S): at 11:10

## 2024-04-14 RX ADMIN — Medication 1 DROP(S): at 18:25

## 2024-04-14 RX ADMIN — PANTOPRAZOLE SODIUM 40 MILLIGRAM(S): 20 TABLET, DELAYED RELEASE ORAL at 05:21

## 2024-04-14 RX ADMIN — CHLORHEXIDINE GLUCONATE 15 MILLILITER(S): 213 SOLUTION TOPICAL at 18:20

## 2024-04-14 RX ADMIN — Medication 1 DROP(S): at 11:45

## 2024-04-14 RX ADMIN — Medication 125 MILLIGRAM(S): at 05:22

## 2024-04-14 RX ADMIN — Medication 25 GRAM(S): at 14:28

## 2024-04-14 RX ADMIN — Medication 5 MILLILITER(S): at 18:20

## 2024-04-14 RX ADMIN — LIDOCAINE 1 PATCH: 4 CREAM TOPICAL at 10:00

## 2024-04-14 RX ADMIN — SIMETHICONE 80 MILLIGRAM(S): 80 TABLET, CHEWABLE ORAL at 05:21

## 2024-04-14 RX ADMIN — SIMETHICONE 80 MILLIGRAM(S): 80 TABLET, CHEWABLE ORAL at 18:24

## 2024-04-14 RX ADMIN — Medication 3 MILLILITER(S): at 05:38

## 2024-04-14 RX ADMIN — LIDOCAINE 1 PATCH: 4 CREAM TOPICAL at 07:30

## 2024-04-14 RX ADMIN — Medication 100 MILLIEQUIVALENT(S): at 10:35

## 2024-04-14 RX ADMIN — CHLORHEXIDINE GLUCONATE 15 MILLILITER(S): 213 SOLUTION TOPICAL at 05:24

## 2024-04-14 RX ADMIN — Medication 650 MILLIGRAM(S): at 03:19

## 2024-04-14 RX ADMIN — Medication 650 MILLIGRAM(S): at 06:30

## 2024-04-14 RX ADMIN — GABAPENTIN 100 MILLIGRAM(S): 400 CAPSULE ORAL at 22:01

## 2024-04-14 RX ADMIN — Medication 15 MILLILITER(S): at 11:43

## 2024-04-14 RX ADMIN — PANTOPRAZOLE SODIUM 40 MILLIGRAM(S): 20 TABLET, DELAYED RELEASE ORAL at 18:20

## 2024-04-14 RX ADMIN — Medication 1 APPLICATION(S): at 14:34

## 2024-04-14 RX ADMIN — Medication 3 MILLILITER(S): at 17:28

## 2024-04-14 RX ADMIN — SODIUM CHLORIDE 50 MILLILITER(S): 9 INJECTION, SOLUTION INTRAVENOUS at 05:23

## 2024-04-14 RX ADMIN — Medication 1 APPLICATION(S): at 05:24

## 2024-04-14 RX ADMIN — Medication 3 MILLILITER(S): at 23:26

## 2024-04-14 RX ADMIN — Medication 500 MILLIGRAM(S): at 11:44

## 2024-04-14 RX ADMIN — Medication 5 MILLILITER(S): at 05:23

## 2024-04-14 RX ADMIN — Medication 100 MILLIEQUIVALENT(S): at 12:45

## 2024-04-14 NOTE — PROGRESS NOTE ADULT - ASSESSMENT
73 yo F PMH T2DM on insulin, HTN, HLD, hypothyroidism, RA on Prednisone, Fibromyalgia, cerebral aneurysm s/p repair, chronic hypercapnic respiratory failure s/p trach (vent dependent) and Chronic PEG 2022 , recurrent C.diff infection (follows with Dr. Newman) , bedbound who presented from home with complaints of possible G tube dislodgement and redness around the site. Had CT Abdomen/Pelvis done showing dislodgement of G tube with balloon in the anterior abdominal wall with air filled track to stomach. Admitted to MICU for ventilator management and given vancomycin/meropenem empirically for treatment of cellulitis. Per family patient has had Known c diff infection for past 2-3 weeks.  Treating with Fidaxomicin.  IR team exchanged PEG on 4/3 however later in the day it remained with leakage.  IR Attending adjusted PEG at bedside on 4/4 and PEG remained with moderate amount of PEG leakage once feeds initiated at reduced rate.  GI team re-consulted as second opinion for PEG management.  On 4/9 PEG was found out of place, a emerson catheter was placed in the stoma to maintain patency.  Patient was planned for PEG revision with both Surgery and GI team involved on 4/11 however patient had fevers up to 102F and procedure was cancelled for infectious work-up.  Antibiotics then switched from Meropenem to Cefepime.  An 18 Fr PEG tube placed at bedside on 4/12 (original PEG size was 20Fr) as stoma site appears to have closed.      4/13:  Low grade temp this am, given IV tylenol.  CT C/A/P with multifocal pneumonia.  Blood cultures, urine cultures negative.  Continuing cefepime.  Low BP, resolved with 500mL LR bolus.  GI cleared PEG for meds only.  75 yo F PMH T2DM on insulin, HTN, HLD, hypothyroidism, RA on Prednisone, Fibromyalgia, cerebral aneurysm s/p repair, chronic hypercapnic respiratory failure s/p trach (vent dependent) and Chronic PEG 2022 , recurrent C.diff infection (follows with Dr. Newman) , bedbound who presented from home with complaints of possible G tube dislodgement and redness around the site. Had CT Abdomen/Pelvis done showing dislodgement of G tube with balloon in the anterior abdominal wall with air filled track to stomach. Admitted to MICU for ventilator management and given vancomycin/meropenem empirically for treatment of cellulitis. Per family patient has had Known c diff infection for past 2-3 weeks.  Treating with Fidaxomicin.  IR team exchanged PEG on 4/3 however later in the day it remained with leakage.  IR Attending adjusted PEG at bedside on 4/4 and PEG remained with moderate amount of PEG leakage once feeds initiated at reduced rate.  GI team re-consulted as second opinion for PEG management.  On 4/9 PEG was found out of place, a emerson catheter was placed in the stoma to maintain patency.  Patient was planned for PEG revision with both Surgery and GI team involved on 4/11 however patient had fevers up to 102F and procedure was cancelled for infectious work-up.  Antibiotics then switched from Meropenem to Cefepime.  An 18 Fr PEG tube placed at bedside on 4/12 (original PEG size was 20Fr) as stoma site appears to have closed.      4/14:

## 2024-04-14 NOTE — PROVIDER CONTACT NOTE (OTHER) - ASSESSMENT
increased work of breathing, skin feels warm. pt complains of 6/10 right abd pain but doesn't want current pain meds
PEG dislodged, NP placed Mcbride for patency
pt given PRN dilaudid IVP 1mg, PRN Zofran 4mg IVP.
Temp of 102.5. No s/s of distress noted.

## 2024-04-14 NOTE — PROVIDER CONTACT NOTE (OTHER) - ACTION/TREATMENT ORDERED:
will order tylenol if fever
MD in to see the patient. Orders received.
will hold off tylenol at this time. continue to monitor pt rectal temp if pt becomes febrile
NP placed folly for patency . Awaiting for G.I consult ordered

## 2024-04-14 NOTE — PROVIDER CONTACT NOTE (OTHER) - BACKGROUND
pt had fever of 100.8 yesterday morning, occasionally tachy, history of fevers
Patient  admitted from home 2/2 dislodged PEG
Pt admitted with cellulitis of abdominal wall.

## 2024-04-14 NOTE — PROGRESS NOTE ADULT - NS ATTEND AMEND GEN_ALL_CORE FT
Pt is a 74 year old female with diabetes, hypertension, hyperlipidemia, hypothyroidism, RA on Prednisone, fibromyalgia, cerebral aneurysm s/p repair, chronic hypercapnic respiratory failure s/p trach (vent dependent) and PEG, recurrent C.diff infection (follows with Dr. Newman) who presented with dislodged PEG admitted to [facility] on 4/2/24 for further care.    #neuro  awake, interactive, follows commands  No acute neurologic issues    #resp  full vent support  monitor for hemoptysis - no katie hemoptysis at this time, continue airway clearance therapy as per routine  on VC/AC - normal pH. Will wean as tolerated  Trach care and suctioning as per RCU team.  Aspiration precautions and oral hygiene in place      #Dislodged PEG  PEG again dislodged on 4/9, emerson in place. Appreciate IR input. GI and surgery consulted. Plan for joint intervention with GI and Surgery - closure and GJ tube placement.   CT A/P performed, GI evaluated. Appears that contrast appropriately in stomach with no extravasation. Okay to trial trickle feeds today.     #ID  Pt febrile to 102 on 4/7 and again febrile to 102 on 4/11. Blood cultures showing serratia 4/7. Repeat blood cultures from 4/9 ntd. Switched to cefepime on 4/11 per ID recommendations. Repeat BCx on 4/11 ntd. CXR AND lactate normal. CT A/P on 4/2 without source of infection. ID consulted. Abx completed.   Completed  fidaxomicin for total 10 day course, restarted vanco po 125 mg qid as suppression as she is back on antibiotics with plan for 5 more days past completion of Abx.     #Renal   stable Cr  No acute renal issues    #ophthalmology   conjunctival erythema - no suspicion for endophthalmitis, start erythromycin gtt, artificial tears, they will monito    Dispo pending medical optimization. Pt full code. DVT ppx in place. Discussed at bedside with pt's family today.

## 2024-04-14 NOTE — PROGRESS NOTE ADULT - SUBJECTIVE AND OBJECTIVE BOX
Patient is a 74y old  Female who presents with a chief complaint of Dislodged G Tube (13 Apr 2024 12:00)      Interval Events:    REVIEW OF SYSTEMS:  [ ] Positive  [ ] All other systems negative  [ ] Unable to assess ROS because ________    Vital Signs Last 24 Hrs  T(C): 37.1 (04-14-24 @ 06:30), Max: 38.2 (04-13-24 @ 08:46)  T(F): 98.8 (04-14-24 @ 06:30), Max: 100.8 (04-13-24 @ 08:46)  HR: 99 (04-14-24 @ 06:03) (88 - 132)  BP: 97/63 (04-14-24 @ 06:03) (80/48 - 169/92)  RR: 16 (04-13-24 @ 18:00) (12 - 16)  SpO2: 100% (04-14-24 @ 06:03) (99% - 100%)    PHYSICAL EXAM:  HEENT:   [ ]Tracheostomy:  [ ]Pupils equal  [ ]No oral lesions  [ ]Abnormal    SKIN  [ ]No Rash  [ ] Abnormal  [ ] pressure    CARDIAC  [ ]Regular  [ ]Abnormal    PULMONARY  [ ]Bilateral Clear Breath Sounds  [ ]Normal Excursion  [ ]Abnormal    GI  [ ]PEG      [ ] +BS		              [ ]Soft, nondistended, nontender	  [ ]Abnormal    MUSCULOSKELETAL                                   [ ]Bedbound                 [ ]Abnormal    [ ]Ambulatory/OOB to chair                           EXTREMITIES                                         [ ]Normal  [ ]Edema                           NEUROLOGIC  [ ] Normal, non focal  [ ] Focal findings:    PSYCHIATRIC  [ ]Alert and appropriate  [ ] Sedated	 [ ]Agitated    :  Mcbride: [ ] Yes, if yes: Date of Placement:                   [  ] No    LINES: Central Lines [ ] Yes, if yes: Date of Placement                                     [  ] No    HOSPITAL MEDICATIONS:  MEDICATIONS  (STANDING):  acetylcysteine 20%  Inhalation 4 milliLiter(s) Inhalation every 8 hours  albuterol/ipratropium for Nebulization 3 milliLiter(s) Nebulizer every 6 hours  amLODIPine   Tablet 10 milliGRAM(s) Oral <User Schedule>  artificial tears (preservative free) Ophthalmic Solution 1 Drop(s) Both EYES four times a day  ascorbic acid 500 milliGRAM(s) Oral daily  Biotene Dry Mouth Oral Rinse 5 milliLiter(s) Swish and Spit every 6 hours  cefepime   IVPB 1000 milliGRAM(s) IV Intermittent every 12 hours  cefepime   IVPB      chlorhexidine 0.12% Liquid 15 milliLiter(s) Oral Mucosa every 12 hours  chlorhexidine 4% Liquid 1 Application(s) Topical <User Schedule>  erythromycin   Ointment 1 Application(s) Both EYES three times a day  escitalopram 10 milliGRAM(s) Oral <User Schedule>  fentaNYL   Patch  25 MICROgram(s)/Hr 1 Patch Transdermal every 72 hours  gabapentin Solution 100 milliGRAM(s) Oral <User Schedule>  insulin glargine Injectable (LANTUS) 5 Unit(s) SubCutaneous <User Schedule>  insulin lispro (ADMELOG) corrective regimen sliding scale   SubCutaneous every 6 hours  lactated ringers. 1000 milliLiter(s) (50 mL/Hr) IV Continuous <Continuous>  levothyroxine Injectable 87.5 MICROGram(s) IV Push <User Schedule>  lidocaine   4% Patch 1 Patch Transdermal at bedtime  lidocaine   4% Patch 1 Patch Transdermal at bedtime  melatonin Liquid 3 milliGRAM(s) Oral at bedtime  multivitamin/minerals/iron Oral Solution (CENTRUM) 15 milliLiter(s) Oral daily  pantoprazole  Injectable 40 milliGRAM(s) IV Push every 12 hours  predniSONE  Solution 5 milliGRAM(s) Oral <User Schedule>  QUEtiapine 12.5 milliGRAM(s) Oral <User Schedule>  simethicone 80 milliGRAM(s) Chew every 12 hours  vancomycin    Solution 125 milliGRAM(s) Oral every 6 hours    MEDICATIONS  (PRN):  calamine/zinc oxide Lotion 1 Application(s) Topical daily PRN Rash and/or Itching  HYDROmorphone  Injectable 1 milliGRAM(s) IV Push every 6 hours PRN Severe Pain (7 - 10)  HYDROmorphone  Injectable 0.5 milliGRAM(s) IV Push every 4 hours PRN Moderate Pain (4 - 6)  ondansetron Injectable 4 milliGRAM(s) IV Push every 8 hours PRN Nausea and/or Vomiting  sodium chloride 0.65% Nasal 1 Spray(s) Both Nostrils every 12 hours PRN Congestion      LABS:                        7.1    6.27  )-----------( 108      ( 13 Apr 2024 19:17 )             25.5     04-13    136  |  102  |  <4<L>  ----------------------------<  147<H>  3.3<L>   |  17<L>  |  <0.30<L>    Ca    9.0      13 Apr 2024 07:08  Phos  3.1     04-13  Mg     1.4     04-13    TPro  6.2  /  Alb  2.5<L>  /  TBili  0.9  /  DBili  x   /  AST  23  /  ALT  21  /  AlkPhos  47  04-13    PT/INR - ( 13 Apr 2024 07:23 )   PT: 17.0 sec;   INR: 1.57 ratio         PTT - ( 13 Apr 2024 07:23 )  PTT:34.4 sec  Urinalysis Basic - ( 13 Apr 2024 07:08 )    Color: x / Appearance: x / SG: x / pH: x  Gluc: 147 mg/dL / Ketone: x  / Bili: x / Urobili: x   Blood: x / Protein: x / Nitrite: x   Leuk Esterase: x / RBC: x / WBC x   Sq Epi: x / Non Sq Epi: x / Bacteria: x          CAPILLARY BLOOD GLUCOSE    MICROBIOLOGY:     RADIOLOGY:  [ ] Reviewed and interpreted by me    Mode: AC/ CMV (Assist Control/ Continuous Mandatory Ventilation)  RR (machine): 12  TV (machine): 350  FiO2: 30  PEEP: 5  ITime: 1  MAP: 10  PIP: 26   Patient is a 74y old  Female who presents with a chief complaint of Dislodged G Tube (13 Apr 2024 12:00)      Interval Events:  No acute events overnight.     REVIEW OF SYSTEMS:  [ ] Positive  [ ] All other systems negative  [X] Unable to assess ROS because ___lethargic____    Vital Signs Last 24 Hrs  T(C): 37.1 (04-14-24 @ 06:30), Max: 38.2 (04-13-24 @ 08:46)  T(F): 98.8 (04-14-24 @ 06:30), Max: 100.8 (04-13-24 @ 08:46)  HR: 99 (04-14-24 @ 06:03) (88 - 132)  BP: 97/63 (04-14-24 @ 06:03) (80/48 - 169/92)  RR: 16 (04-13-24 @ 18:00) (12 - 16)  SpO2: 100% (04-14-24 @ 06:03) (99% - 100%)    PHYSICAL EXAM:  HEENT:   [X]Tracheostomy: #8 distal XLT cuffed shiley   [X]Pupils equal  [ ]No oral lesions  [ ]Abnormal    SKIN  [ ]No Rash  [ ] Abnormal  [X] pressure - sacral DTI    CARDIAC  [ ]Regular  [X]Abnormal - tachycardia    PULMONARY  [X]Bilateral Clear Breath Sounds  [ ]Normal Excursion  [ ]Abnormal    GI  [X]PEG site c/d/i, no redness - still with clear mucousy leakage     [X] +BS		              [X]Soft, nondistended, nontender	  [X]Abnormal - rectal tube in place     MUSCULOSKELETAL                                   [X]Bedbound                 [ ]Abnormal    [ ]Ambulatory/OOB to chair                           EXTREMITIES                                         [X]Normal  [ ]Edema                           NEUROLOGIC  [ ] Normal, non focal  [X] Focal findings: awake and alert, follows commands on all extremities    PSYCHIATRIC  [X]Alert and appropriate  [ ] Sedated	 [ ]Agitated    :  Mcbride: [ ] Yes, if yes: Date of Placement:                   [X] No    LINES: Central Lines [ ] Yes, if yes: Date of Placement                                     [X] No    HOSPITAL MEDICATIONS:  MEDICATIONS  (STANDING):  acetylcysteine 20%  Inhalation 4 milliLiter(s) Inhalation every 8 hours  albuterol/ipratropium for Nebulization 3 milliLiter(s) Nebulizer every 6 hours  amLODIPine   Tablet 10 milliGRAM(s) Oral <User Schedule>  artificial tears (preservative free) Ophthalmic Solution 1 Drop(s) Both EYES four times a day  ascorbic acid 500 milliGRAM(s) Oral daily  Biotene Dry Mouth Oral Rinse 5 milliLiter(s) Swish and Spit every 6 hours  cefepime   IVPB 1000 milliGRAM(s) IV Intermittent every 12 hours  cefepime   IVPB      chlorhexidine 0.12% Liquid 15 milliLiter(s) Oral Mucosa every 12 hours  chlorhexidine 4% Liquid 1 Application(s) Topical <User Schedule>  erythromycin   Ointment 1 Application(s) Both EYES three times a day  escitalopram 10 milliGRAM(s) Oral <User Schedule>  fentaNYL   Patch  25 MICROgram(s)/Hr 1 Patch Transdermal every 72 hours  gabapentin Solution 100 milliGRAM(s) Oral <User Schedule>  insulin glargine Injectable (LANTUS) 5 Unit(s) SubCutaneous <User Schedule>  insulin lispro (ADMELOG) corrective regimen sliding scale   SubCutaneous every 6 hours  lactated ringers. 1000 milliLiter(s) (50 mL/Hr) IV Continuous <Continuous>  levothyroxine Injectable 87.5 MICROGram(s) IV Push <User Schedule>  lidocaine   4% Patch 1 Patch Transdermal at bedtime  lidocaine   4% Patch 1 Patch Transdermal at bedtime  melatonin Liquid 3 milliGRAM(s) Oral at bedtime  multivitamin/minerals/iron Oral Solution (CENTRUM) 15 milliLiter(s) Oral daily  pantoprazole  Injectable 40 milliGRAM(s) IV Push every 12 hours  predniSONE  Solution 5 milliGRAM(s) Oral <User Schedule>  QUEtiapine 12.5 milliGRAM(s) Oral <User Schedule>  simethicone 80 milliGRAM(s) Chew every 12 hours  vancomycin    Solution 125 milliGRAM(s) Oral every 6 hours    MEDICATIONS  (PRN):  calamine/zinc oxide Lotion 1 Application(s) Topical daily PRN Rash and/or Itching  HYDROmorphone  Injectable 1 milliGRAM(s) IV Push every 6 hours PRN Severe Pain (7 - 10)  HYDROmorphone  Injectable 0.5 milliGRAM(s) IV Push every 4 hours PRN Moderate Pain (4 - 6)  ondansetron Injectable 4 milliGRAM(s) IV Push every 8 hours PRN Nausea and/or Vomiting  sodium chloride 0.65% Nasal 1 Spray(s) Both Nostrils every 12 hours PRN Congestion    LABS:                        7.1    6.27  )-----------( 108      ( 13 Apr 2024 19:17 )             25.5     04-13    136  |  102  |  <4<L>  ----------------------------<  147<H>  3.3<L>   |  17<L>  |  <0.30<L>    Ca    9.0      13 Apr 2024 07:08  Phos  3.1     04-13  Mg     1.4     04-13    TPro  6.2  /  Alb  2.5<L>  /  TBili  0.9  /  DBili  x   /  AST  23  /  ALT  21  /  AlkPhos  47  04-13    PT/INR - ( 13 Apr 2024 07:23 )   PT: 17.0 sec;   INR: 1.57 ratio         PTT - ( 13 Apr 2024 07:23 )  PTT:34.4 sec  Urinalysis Basic - ( 13 Apr 2024 07:08 )    Color: x / Appearance: x / SG: x / pH: x  Gluc: 147 mg/dL / Ketone: x  / Bili: x / Urobili: x   Blood: x / Protein: x / Nitrite: x   Leuk Esterase: x / RBC: x / WBC x   Sq Epi: x / Non Sq Epi: x / Bacteria: x    CAPILLARY BLOOD GLUCOSE    MICROBIOLOGY:     RADIOLOGY:  [ ] Reviewed and interpreted by me    Mode: AC/ CMV (Assist Control/ Continuous Mandatory Ventilation)  RR (machine): 12  TV (machine): 350  FiO2: 30  PEEP: 5  ITime: 1  MAP: 10  PIP: 26

## 2024-04-14 NOTE — PROGRESS NOTE ADULT - ASSESSMENT
73yo woman  h/o T2DM (4/4/24: A1C = 6.2%), HTN, HLD, anemia, hypothyroidism, RA, fibromyalgia, remote cerebral aneurysm repair, acute hypercapnic respiratory failure with tracheostomy, PEG tube (initially placed 2022), and bedbound, recurrent C diff presented for PEG tube dislodgment. Admitted 4/2/24  weight = 54.5 kg    Recurrent C diff - first episode Aug 2023 treated with tapering PO vanco, recurrent episode 1/31/24 treated with PO vanco and then fidaxomicin completed in Feb - most recently tested positive 3/20/24 seen by Dr. Newman 3/29 outpatient, started on fidaxomicin that day - tube feeds concurrently held, unclear if improving afterwards per family  Chronic sacral decubitus wound  3/20 Klebisella bacteruria R only to amp and sputum few Pseudomonas R to FQs w/o polys on gram stian - treatment of urine was deferred to avoid exacerbating C diff    Tmax 100, no leukocytosis  Concern for cellulitis around PEG tube site - area with very minimal erythema, remarkable receded from skin paula placed on admission  UA 11 WBC  CT a/p: no infectious focus or colitis  MRSA PCR+    4/3 IR - PEG exchanged bedside to 20 Fr Kangaroo EnFit  - Bedside XR demonstrates appropriately positioned G-tube with contrast filling into the stomach and bowel.    4/2 Blood Cultures x 2 NGTD;  Positive MRSA/MSSA PCR  4/3 Urine cultures NEG  4/4 fever  4/4 Wound care assessment appreciated:   " area of persistent nonblanchable dark discoloration that is inconsistent with a patient's surrounding skin tone as well as a white juan j film noted over B/L buttocks/sacral skin, area measures approximately 10cm x 10cm x 0cm- presentation is consistent with a deep tissue injury in evolution with incontinence and fungal involvement present on admission. Within the apex of the gluteal cleft/base of sacrum there is full thickness skin loss with red, beefy tissue noted, area measures approximately 3cm x 1cm x 0.3cm- presentation is consistent with a deep tissue injury in evolution with incontinence involvement present on admission."    Antibiotics  Meropenem    poVanco 4/2  Fdaxomicin    cefepime 4/11     PEG site no obvious cellulitis - probably more irritation from displacement/leaked contents, no indication for antimicrobials.         Suggest:  S. Multiphila and P aeruginosa in sputum at present  and BC with serratia from 4/7  Source of serratia unclear :   likely related  to PEG manipulations     discussed with GI and team   for replacement of PEG : cleared as of now from ID view   Discussed with team and daughter in detail last pm and this am     Completed fidax/PO vanco course for recurrent C diff  Also completed boone -> cefepime today. Can provide vancomycin 125 mg PO Q12H x5d for tail coverage.  no therapy for sputum isolates unless signs of pna develop.    Currently afebrile x24h. PEG site leaking noted. F/u GI  Send BCx x2 sets if febrile. If remains afebrile and culture neg, no contraindication to place G-tube at bedside.    Plan discussed with [facility] provider team at bedside.  Thank you for this consult. Inpatient ID team will follow.    Brandon Chirinos MD, PhD  Attending Physician  Division of Infectious Diseases  Department of Medicine    Please contact through MS Teams message.  Office: [phone] (after 5 PM or weekend)

## 2024-04-14 NOTE — PROGRESS NOTE ADULT - SUBJECTIVE AND OBJECTIVE BOX
Follow Up:    Fever    Interval History/ROS:  Afebrile x24h. Patient was seen and examined at bedside. Family at bedside. Patient was being cleaned while being examined. PEG tube leakage noted. Nonverbal, ROS limited.    Allergies  pineapple (Unknown)  Tagamet (Unknown)  heparin (Unknown)  walnut (Unknown)  metronidazole (Rash)  penicillin (Unknown)  Lyrica (Unknown)  Pecan, Filbert, Hazelnut (Unknown)        ANTIMICROBIALS:      OTHER MEDS:  MEDICATIONS  (STANDING):  acetylcysteine 20%  Inhalation 4 every 12 hours  albuterol/ipratropium for Nebulization 3 every 6 hours  amLODIPine   Tablet 10 <User Schedule>  escitalopram 10 <User Schedule>  fentaNYL   Patch  25 MICROgram(s)/Hr 1 every 72 hours  gabapentin Solution 100 <User Schedule>  HYDROmorphone  Injectable 1 every 6 hours PRN  HYDROmorphone  Injectable 0.5 every 4 hours PRN  insulin glargine Injectable (LANTUS) 5 <User Schedule>  insulin lispro (ADMELOG) corrective regimen sliding scale  every 6 hours  levothyroxine Injectable 87.5 <User Schedule>  melatonin Liquid 3 at bedtime  ondansetron Injectable 4 every 8 hours PRN  pantoprazole  Injectable 40 every 12 hours  predniSONE  Solution 5 <User Schedule>  QUEtiapine 12.5 <User Schedule>  simethicone 80 every 12 hours      Vital Signs Last 24 Hrs  T(C): 37.2 (14 Apr 2024 10:48), Max: 37.2 (14 Apr 2024 10:48)  T(F): 99 (14 Apr 2024 10:48), Max: 99 (14 Apr 2024 10:48)  HR: 94 (14 Apr 2024 11:31) (88 - 116)  BP: 105/45 (14 Apr 2024 10:48) (80/48 - 140/53)  BP(mean): --  RR: 18 (14 Apr 2024 10:48) (12 - 18)  SpO2: 100% (14 Apr 2024 11:31) (99% - 100%)    Parameters below as of 14 Apr 2024 11:31  Patient On (Oxygen Delivery Method): ventilator        PHYSICAL EXAM:  Constitutional: non-toxic, no distress  ENT: +trach  GI:  +PEG leaking  Neurologic: awake  Skin:  +sacral ulcer w/ no peripheral erythema or discharge                                7.1    5.19  )-----------( 104      ( 14 Apr 2024 09:13 )             25.2       04-14    137  |  103  |  <4<L>  ----------------------------<  106<H>  3.2<L>   |  18<L>  |  <0.30<L>    Ca    9.7      14 Apr 2024 09:13  Phos  3.1     04-14  Mg     1.7     04-14    TPro  5.5<L>  /  Alb  2.3<L>  /  TBili  2.0<H>  /  DBili  x   /  AST  26  /  ALT  19  /  AlkPhos  44  04-14      Urinalysis Basic - ( 14 Apr 2024 09:13 )    Color: x / Appearance: x / SG: x / pH: x  Gluc: 106 mg/dL / Ketone: x  / Bili: x / Urobili: x   Blood: x / Protein: x / Nitrite: x   Leuk Esterase: x / RBC: x / WBC x   Sq Epi: x / Non Sq Epi: x / Bacteria: x        MICROBIOLOGY:  v  Catheterized Catheterized  04-12-24   No growth  --  --      .Blood Blood-Peripheral  04-12-24   No growth at 24 hours  --  --      .Blood Blood-Peripheral  04-11-24   No growth at 48 Hours  --  --      .Blood Blood-Peripheral  04-09-24   No growth at 4 days  --  --      .Sputum Sputum  04-07-24   Mixed gram negative rods Culture includes  Moderate Stenotrophomonas maltophilia  Few Pseudomonas aeruginosa (Carbapenem Resistant)  --  Stenotrophomonas maltophilia  Pseudomonas aeruginosa (Carbapenem Resistant)      .Blood Blood-Peripheral  04-07-24   No growth at 5 days  --  Blood Culture PCR  Serratia marcescens      .Sputum Sputum  04-05-24   Numerous Pseudomonas aeruginosa  Normal Respiratory Shanda present  --  Pseudomonas aeruginosa      .Blood Blood-Peripheral  04-04-24   No growth at 5 days  --  --      Clean Catch Clean Catch (Midstream)  04-03-24   <10,000 CFU/mL Normal Urogenital Shanda  --  --      .Blood Blood-Peripheral  04-02-24   No growth at 5 days  --  --                RADIOLOGY:  Imaging below independently reviewed.  < from: CT Abdomen and Pelvis w/ Oral Cont and w/ IV Cont (04.12.24 @ 21:36) >    IMPRESSION:    CHEST:  -Multifocal pneumonia as described above, superimposed on prior findings   of atelectasis. Follow-up to resolution with repeat chest CT in one   month, or sooner as clinically warranted.  -Tracheostomy terminates at the nick, similar to prior imaging. Central   and distal airway secretions and resultant mosaic attenuation of the   aerated lung parenchyma, likely from air trapping.    ABDOMEN/PELVIS:  -Colon is overall nondistended, limiting evaluation. Scattered mild   apparent colonic wall thickening is suggested. Correlate for any signs   and symptoms of persistent colitis.  -Right anterior kidney faint geographic region of low attenuation on   image 138 series 3 is similar to 4/2/2024 CT, of unclear significance.   Correlate for any signs and symptoms of renal infection.  -Gastrostomy tube localized to the stomach. Mild inflammation/thickening   along gastrostomy tube tract is similar to prior imaging. Correlate   clinically.  -Sacrum decubitus ulcer redemonstrated, with increasedskin thickening in   the interim. Correlate clinically.    < end of copied text >

## 2024-04-14 NOTE — PROGRESS NOTE ADULT - PROBLEM SELECTOR PLAN 1
PEG tube dislodgement on admission   - S/p CT abd/pelvis showing dislodgement of G tube with balloon in the anterior abdominal wall with air filled track to stomach  - initially GI and Surgery consulted   - S/p bedside exchange by IR on 4/3- # 20 Fr Kangaroo EnFit placed   - 4/4: PEG leaking.  Feeds held.  IR adjusted PEG at bedside. G-tube study pending to exclude extravasation. Patient remains NPO except for meds, on IVF, LR at 50ml/hr.  - 4/5: PEG still leaking, adjusted at bedside by IR Attending.  - 4/6: PEG still leaking, IR informed, planned for PEG revision on 4/8 or 4/9  - 4/8-4/9 IR has aborted procedure - GI Dr. Cleaning has been brought onto case for new PEG  - 4/9 PEG fell out spontaneously, emerson placed into tract - assessed by Dr. Cleaning  - 4/12: Emerson replaced with 18Fr PEG at bedside, bumper at 8cm (intentionally loose) - CT A/P done   - 4/13 PEG ok to meds

## 2024-04-15 ENCOUNTER — TRANSCRIPTION ENCOUNTER (OUTPATIENT)
Age: 74
End: 2024-04-15

## 2024-04-15 DIAGNOSIS — E11.9 TYPE 2 DIABETES MELLITUS WITHOUT COMPLICATIONS: ICD-10-CM

## 2024-04-15 LAB
ALBUMIN SERPL ELPH-MCNC: 2.5 G/DL — LOW (ref 3.3–5)
ALP SERPL-CCNC: 50 U/L — SIGNIFICANT CHANGE UP (ref 40–120)
ALT FLD-CCNC: 27 U/L — SIGNIFICANT CHANGE UP (ref 10–45)
ANION GAP SERPL CALC-SCNC: 14 MMOL/L — SIGNIFICANT CHANGE UP (ref 5–17)
ANION GAP SERPL CALC-SCNC: 14 MMOL/L — SIGNIFICANT CHANGE UP (ref 5–17)
APTT BLD: 35.6 SEC — SIGNIFICANT CHANGE UP (ref 24.5–35.6)
AST SERPL-CCNC: 30 U/L — SIGNIFICANT CHANGE UP (ref 10–40)
BILIRUB SERPL-MCNC: 1.6 MG/DL — HIGH (ref 0.2–1.2)
BLD GP AB SCN SERPL QL: POSITIVE — SIGNIFICANT CHANGE UP
BUN SERPL-MCNC: 5 MG/DL — LOW (ref 7–23)
BUN SERPL-MCNC: <4 MG/DL — LOW (ref 7–23)
CALCIUM SERPL-MCNC: 10.3 MG/DL — SIGNIFICANT CHANGE UP (ref 8.4–10.5)
CALCIUM SERPL-MCNC: 9.5 MG/DL — SIGNIFICANT CHANGE UP (ref 8.4–10.5)
CHLORIDE SERPL-SCNC: 105 MMOL/L — SIGNIFICANT CHANGE UP (ref 96–108)
CHLORIDE SERPL-SCNC: 107 MMOL/L — SIGNIFICANT CHANGE UP (ref 96–108)
CO2 SERPL-SCNC: 18 MMOL/L — LOW (ref 22–31)
CO2 SERPL-SCNC: 20 MMOL/L — LOW (ref 22–31)
CREAT SERPL-MCNC: <0.3 MG/DL — LOW (ref 0.5–1.3)
CREAT SERPL-MCNC: <0.3 MG/DL — LOW (ref 0.5–1.3)
EGFR: 113 ML/MIN/1.73M2 — SIGNIFICANT CHANGE UP
EGFR: 113 ML/MIN/1.73M2 — SIGNIFICANT CHANGE UP
GLUCOSE BLDC GLUCOMTR-MCNC: 106 MG/DL — HIGH (ref 70–99)
GLUCOSE BLDC GLUCOMTR-MCNC: 112 MG/DL — HIGH (ref 70–99)
GLUCOSE BLDC GLUCOMTR-MCNC: 115 MG/DL — HIGH (ref 70–99)
GLUCOSE BLDC GLUCOMTR-MCNC: 165 MG/DL — HIGH (ref 70–99)
GLUCOSE SERPL-MCNC: 101 MG/DL — HIGH (ref 70–99)
GLUCOSE SERPL-MCNC: 160 MG/DL — HIGH (ref 70–99)
HCT VFR BLD CALC: 23.7 % — LOW (ref 34.5–45)
HGB BLD-MCNC: 6.9 G/DL — CRITICAL LOW (ref 11.5–15.5)
INR BLD: 1.56 RATIO — HIGH (ref 0.85–1.18)
MAGNESIUM SERPL-MCNC: 1.7 MG/DL — SIGNIFICANT CHANGE UP (ref 1.6–2.6)
MCHC RBC-ENTMCNC: 24.8 PG — LOW (ref 27–34)
MCHC RBC-ENTMCNC: 29.1 GM/DL — LOW (ref 32–36)
MCV RBC AUTO: 85.3 FL — SIGNIFICANT CHANGE UP (ref 80–100)
NRBC # BLD: 0 /100 WBCS — SIGNIFICANT CHANGE UP (ref 0–0)
PHOSPHATE SERPL-MCNC: 2.9 MG/DL — SIGNIFICANT CHANGE UP (ref 2.5–4.5)
PLATELET # BLD AUTO: 113 K/UL — LOW (ref 150–400)
POTASSIUM SERPL-MCNC: 3 MMOL/L — LOW (ref 3.5–5.3)
POTASSIUM SERPL-MCNC: 5.5 MMOL/L — HIGH (ref 3.5–5.3)
POTASSIUM SERPL-SCNC: 3 MMOL/L — LOW (ref 3.5–5.3)
POTASSIUM SERPL-SCNC: 5.5 MMOL/L — HIGH (ref 3.5–5.3)
PROT SERPL-MCNC: 6 G/DL — SIGNIFICANT CHANGE UP (ref 6–8.3)
PROTHROM AB SERPL-ACNC: 16.9 SEC — HIGH (ref 9.5–13)
RBC # BLD: 2.78 M/UL — LOW (ref 3.8–5.2)
RBC # FLD: 19.5 % — HIGH (ref 10.3–14.5)
RH IG SCN BLD-IMP: POSITIVE — SIGNIFICANT CHANGE UP
SODIUM SERPL-SCNC: 139 MMOL/L — SIGNIFICANT CHANGE UP (ref 135–145)
SODIUM SERPL-SCNC: 139 MMOL/L — SIGNIFICANT CHANGE UP (ref 135–145)
WBC # BLD: 3.23 K/UL — LOW (ref 3.8–10.5)
WBC # FLD AUTO: 3.23 K/UL — LOW (ref 3.8–10.5)

## 2024-04-15 PROCEDURE — 99232 SBSQ HOSP IP/OBS MODERATE 35: CPT

## 2024-04-15 RX ORDER — SIMETHICONE 80 MG/1
80 TABLET, CHEWABLE ORAL EVERY 6 HOURS
Refills: 0 | Status: DISCONTINUED | OUTPATIENT
Start: 2024-04-15 | End: 2024-05-01

## 2024-04-15 RX ORDER — LANOLIN/MINERAL OIL
1 LOTION (ML) TOPICAL EVERY 8 HOURS
Refills: 0 | Status: DISCONTINUED | OUTPATIENT
Start: 2024-04-15 | End: 2024-05-01

## 2024-04-15 RX ORDER — POTASSIUM CHLORIDE 20 MEQ
40 PACKET (EA) ORAL ONCE
Refills: 0 | Status: COMPLETED | OUTPATIENT
Start: 2024-04-15 | End: 2024-04-15

## 2024-04-15 RX ORDER — FENTANYL CITRATE 50 UG/ML
1 INJECTION INTRAVENOUS
Refills: 0 | Status: DISCONTINUED | OUTPATIENT
Start: 2024-04-15 | End: 2024-04-15

## 2024-04-15 RX ORDER — POTASSIUM CHLORIDE 20 MEQ
10 PACKET (EA) ORAL
Refills: 0 | Status: COMPLETED | OUTPATIENT
Start: 2024-04-15 | End: 2024-04-15

## 2024-04-15 RX ORDER — FUROSEMIDE 40 MG
20 TABLET ORAL ONCE
Refills: 0 | Status: DISCONTINUED | OUTPATIENT
Start: 2024-04-15 | End: 2024-04-15

## 2024-04-15 RX ADMIN — CHLORHEXIDINE GLUCONATE 1 APPLICATION(S): 213 SOLUTION TOPICAL at 05:19

## 2024-04-15 RX ADMIN — Medication 3 MILLILITER(S): at 17:43

## 2024-04-15 RX ADMIN — Medication 1 DROP(S): at 00:03

## 2024-04-15 RX ADMIN — Medication 3 MILLIGRAM(S): at 21:22

## 2024-04-15 RX ADMIN — GABAPENTIN 100 MILLIGRAM(S): 400 CAPSULE ORAL at 10:39

## 2024-04-15 RX ADMIN — HYDROMORPHONE HYDROCHLORIDE 1 MILLIGRAM(S): 2 INJECTION INTRAMUSCULAR; INTRAVENOUS; SUBCUTANEOUS at 21:48

## 2024-04-15 RX ADMIN — HYDROMORPHONE HYDROCHLORIDE 1 MILLIGRAM(S): 2 INJECTION INTRAMUSCULAR; INTRAVENOUS; SUBCUTANEOUS at 11:38

## 2024-04-15 RX ADMIN — Medication 40 MILLIEQUIVALENT(S): at 13:09

## 2024-04-15 RX ADMIN — Medication 1 APPLICATION(S): at 21:23

## 2024-04-15 RX ADMIN — LIDOCAINE 1 PATCH: 4 CREAM TOPICAL at 21:24

## 2024-04-15 RX ADMIN — HYDROMORPHONE HYDROCHLORIDE 0.5 MILLIGRAM(S): 2 INJECTION INTRAMUSCULAR; INTRAVENOUS; SUBCUTANEOUS at 00:24

## 2024-04-15 RX ADMIN — CHLORHEXIDINE GLUCONATE 15 MILLILITER(S): 213 SOLUTION TOPICAL at 05:14

## 2024-04-15 RX ADMIN — SIMETHICONE 80 MILLIGRAM(S): 80 TABLET, CHEWABLE ORAL at 18:24

## 2024-04-15 RX ADMIN — Medication 1 DROP(S): at 18:14

## 2024-04-15 RX ADMIN — LIDOCAINE 1 PATCH: 4 CREAM TOPICAL at 11:07

## 2024-04-15 RX ADMIN — Medication 3 MILLILITER(S): at 23:10

## 2024-04-15 RX ADMIN — Medication 5 MILLILITER(S): at 05:14

## 2024-04-15 RX ADMIN — SIMETHICONE 80 MILLIGRAM(S): 80 TABLET, CHEWABLE ORAL at 05:19

## 2024-04-15 RX ADMIN — PANTOPRAZOLE SODIUM 40 MILLIGRAM(S): 20 TABLET, DELAYED RELEASE ORAL at 05:10

## 2024-04-15 RX ADMIN — Medication 100 MILLIEQUIVALENT(S): at 15:20

## 2024-04-15 RX ADMIN — HYDROMORPHONE HYDROCHLORIDE 0.5 MILLIGRAM(S): 2 INJECTION INTRAMUSCULAR; INTRAVENOUS; SUBCUTANEOUS at 00:50

## 2024-04-15 RX ADMIN — CHLORHEXIDINE GLUCONATE 15 MILLILITER(S): 213 SOLUTION TOPICAL at 18:23

## 2024-04-15 RX ADMIN — HYDROMORPHONE HYDROCHLORIDE 1 MILLIGRAM(S): 2 INJECTION INTRAMUSCULAR; INTRAVENOUS; SUBCUTANEOUS at 12:40

## 2024-04-15 RX ADMIN — Medication 125 MILLIGRAM(S): at 05:19

## 2024-04-15 RX ADMIN — INSULIN GLARGINE 5 UNIT(S): 100 INJECTION, SOLUTION SUBCUTANEOUS at 18:13

## 2024-04-15 RX ADMIN — Medication 5 MILLIGRAM(S): at 10:54

## 2024-04-15 RX ADMIN — Medication 100 MILLIEQUIVALENT(S): at 14:11

## 2024-04-15 RX ADMIN — Medication 87.5 MICROGRAM(S): at 05:09

## 2024-04-15 RX ADMIN — Medication 5 MILLILITER(S): at 00:03

## 2024-04-15 RX ADMIN — Medication 5 MILLILITER(S): at 11:37

## 2024-04-15 RX ADMIN — Medication 100 MILLIEQUIVALENT(S): at 13:09

## 2024-04-15 RX ADMIN — LIDOCAINE 1 PATCH: 4 CREAM TOPICAL at 07:00

## 2024-04-15 RX ADMIN — ESCITALOPRAM OXALATE 10 MILLIGRAM(S): 10 TABLET, FILM COATED ORAL at 18:23

## 2024-04-15 RX ADMIN — Medication 1 DROP(S): at 05:15

## 2024-04-15 RX ADMIN — Medication 1 APPLICATION(S): at 05:14

## 2024-04-15 RX ADMIN — Medication 1: at 18:08

## 2024-04-15 RX ADMIN — Medication 500 MILLIGRAM(S): at 11:37

## 2024-04-15 RX ADMIN — GABAPENTIN 100 MILLIGRAM(S): 400 CAPSULE ORAL at 21:23

## 2024-04-15 RX ADMIN — LIDOCAINE 1 PATCH: 4 CREAM TOPICAL at 11:06

## 2024-04-15 RX ADMIN — Medication 4 MILLILITER(S): at 05:15

## 2024-04-15 RX ADMIN — HYDROMORPHONE HYDROCHLORIDE 0.5 MILLIGRAM(S): 2 INJECTION INTRAMUSCULAR; INTRAVENOUS; SUBCUTANEOUS at 18:52

## 2024-04-15 RX ADMIN — Medication 1 DROP(S): at 11:37

## 2024-04-15 RX ADMIN — Medication 15 MILLILITER(S): at 11:37

## 2024-04-15 RX ADMIN — Medication 1 APPLICATION(S): at 13:09

## 2024-04-15 RX ADMIN — Medication 3 MILLILITER(S): at 05:15

## 2024-04-15 RX ADMIN — Medication 5 MILLILITER(S): at 18:20

## 2024-04-15 RX ADMIN — SODIUM CHLORIDE 50 MILLILITER(S): 9 INJECTION, SOLUTION INTRAVENOUS at 05:07

## 2024-04-15 RX ADMIN — QUETIAPINE FUMARATE 12.5 MILLIGRAM(S): 200 TABLET, FILM COATED ORAL at 15:49

## 2024-04-15 RX ADMIN — Medication 3 MILLILITER(S): at 11:56

## 2024-04-15 RX ADMIN — AMLODIPINE BESYLATE 10 MILLIGRAM(S): 2.5 TABLET ORAL at 10:41

## 2024-04-15 RX ADMIN — Medication 125 MILLIGRAM(S): at 18:20

## 2024-04-15 RX ADMIN — Medication 1 APPLICATION(S): at 13:10

## 2024-04-15 RX ADMIN — PANTOPRAZOLE SODIUM 40 MILLIGRAM(S): 20 TABLET, DELAYED RELEASE ORAL at 18:20

## 2024-04-15 RX ADMIN — HYDROMORPHONE HYDROCHLORIDE 1 MILLIGRAM(S): 2 INJECTION INTRAMUSCULAR; INTRAVENOUS; SUBCUTANEOUS at 22:30

## 2024-04-15 RX ADMIN — SIMETHICONE 80 MILLIGRAM(S): 80 TABLET, CHEWABLE ORAL at 13:09

## 2024-04-15 RX ADMIN — HYDROMORPHONE HYDROCHLORIDE 0.5 MILLIGRAM(S): 2 INJECTION INTRAMUSCULAR; INTRAVENOUS; SUBCUTANEOUS at 21:48

## 2024-04-15 NOTE — PROGRESS NOTE ADULT - ASSESSMENT
73yo woman  h/o T2DM (4/4/24: A1C = 6.2%), HTN, HLD, anemia, hypothyroidism, RA, fibromyalgia, remote cerebral aneurysm repair, acute hypercapnic respiratory failure with tracheostomy, PEG tube (initially placed 2022), and bedbound, recurrent C diff presented for PEG tube dislodgment. Admitted 4/2/24  weight = 54.5 kg    Recurrent C diff - first episode Aug 2023 treated with tapering PO vanco, recurrent episode 1/31/24 treated with PO vanco and then fidaxomicin completed in Feb - most recently tested positive 3/20/24 seen by Dr. Newman 3/29 outpatient, started on fidaxomicin that day - tube feeds concurrently held, unclear if improving afterwards per family  Chronic sacral decubitus wound  3/20 Klebisella bacteruria R only to amp and sputum few Pseudomonas R to FQs w/o polys on gram stian - treatment of urine was deferred to avoid exacerbating C diff    Tmax 100, no leukocytosis  Concern for cellulitis around PEG tube site - area with very minimal erythema, remarkable receded from skin norm placed on admission  UA 11 WBC  CT a/p: no infectious focus or colitis  MRSA PCR+    4/3 IR - PEG exchanged bedside to 20 Fr Kangaroo EnFit  - Bedside XR demonstrates appropriately positioned G-tube with contrast filling into the stomach and bowel.    4/2 Blood Cultures x 2 NGTD;  Positive MRSA/MSSA PCR  4/3 Urine cultures NEG  4/4 fever  4/4 Wound care assessment appreciated:   " area of persistent nonblanchable dark discoloration that is inconsistent with a patient's surrounding skin tone as well as a white juan j film noted over B/L buttocks/sacral skin, area measures approximately 10cm x 10cm x 0cm- presentation is consistent with a deep tissue injury in evolution with incontinence and fungal involvement present on admission. Within the apex of the gluteal cleft/base of sacrum there is full thickness skin loss with red, beefy tissue noted, area measures approximately 3cm x 1cm x 0.3cm- presentation is consistent with a deep tissue injury in evolution with incontinence involvement present on admission."  4/7 fever; Meropenem resumed  Positive Blood Culture x1 Serratia marcesens    Antibiotics  Meropenem 4/2 -->4/3; 4/7 --> 4/11  Cefepime 4/11 --> 4/14  IV Vanco 4/2  Fdaxomicin 4/3--> 4/8  PO Vanco 4/8 -->     Suggest:  Continue po Vanco  Extended po Vancomycin taper  Bezlotoxumab (Zinplava) at end of Vanco course as out pt    Vanco dosing schedule  Vancomycin 125 mg po 4 times daily 4/8 --> 4/18  Vancomycin 125 mg po 2 times daily 4/19 --> 4/25  Vancomycin 125 mg po once daily 4/26 -->5/2  Vancomycin 125 mg po once every other day 5/3 --> 5/9  Vancomycin 125 mg po once every third day 5/10 --> 5/23/24    Given multiple recurrences in context of advanced age, debility and multiple co-morbidities, administration of Bezlotoxumab (Zinplava) 500 mg over 1 hours is indicated to reduce future recurrences  Ref: Norm Wheeler M.D., Rojas Contreras M.D., Scott Griffith, Ph.D., Shiva Abraham M.D., Gabriel Falcon D.O., Pedro Weiss M.D., Talib Hampton M.D., Sena Hughes M.D., Raimundo Marte M.D., Melissa Soria M.D., Piyush Lamas M.D., Eliceo Romero M.D., Neyda Xie M.D., Jude Peters M.D., Neelima Vela B.S.M.T., Hannah Cardenas, Ph.D., Ana Quiroz, B.S., ARLENE Waldrop.S., Khushbu Flores M.D., Jean Carlos Ayala M.D., and Kristin Lopez, Ph.D., for the [employer]*    Bezlotoxumab is suggested in IDSA guidelines -  Ref: Clinical Practice Guidelines for the Management of Clostridioides difficile Infection in Adults: 2021 Update by SHEA/IDSA  Published VERNA, 6/14/2021; Clinical Infectious Diseases, epdc596, https://doi.org/10.1093/verna/jdch797    discussed with daughter

## 2024-04-15 NOTE — PROGRESS NOTE ADULT - PROBLEM SELECTOR PLAN 5
Chronic Hypoxemic/Hypercapnic Respiratory Failure  Chronic Trach/Vent dependant 2/2 Hypercapnic Resp Failure since 10/2022   - S/p Trach # 8 Distal XLT Shiley Cuffed   - Home Vent: volume /12/30%/+5   Settings changed on 4/11 to 350/12/30%/+5 as she complained of dyspnea.  - PS trials as tolerated.  - Chest PT, duonebs PRN 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 since 10/2022   - S/p Trach # 8 Distal XLT Shiley Cuffed   - Home Vent: volume /12/30%/+5     - Settings changed on 4/11 to 350/12/30%/+5 as she complained of dyspnea  - PS trials as tolerated.  - Chest PT, Duonebs q 6 hrs - Continue Lantus 5 units QHS   - Continue ISS   - Monitor BGMs

## 2024-04-15 NOTE — PROGRESS NOTE ADULT - SUBJECTIVE AND OBJECTIVE BOX
Follow Up:  Cdiff    Interval History/ROS: leaking around PEG has limited its use    Allergies  pineapple (Unknown)  Tagamet (Unknown)  heparin (Unknown)  walnut (Unknown)  metronidazole (Rash)  penicillin (Unknown)  Lyrica (Unknown)  Andressa Rawls Hazelnut (Unknown)    ANTIMICROBIALS:  vancomycin    Solution 125 every 12 hours      OTHER MEDS:  MEDICATIONS  (STANDING):  albuterol/ipratropium for Nebulization 3 every 6 hours  amLODIPine   Tablet 10 <User Schedule>  escitalopram 10 <User Schedule>  furosemide   Injectable 20 once  gabapentin Solution 100 <User Schedule>  HYDROmorphone  Injectable 1 every 6 hours PRN  HYDROmorphone  Injectable 0.5 every 4 hours PRN  insulin glargine Injectable (LANTUS) 5 <User Schedule>  insulin lispro (ADMELOG) corrective regimen sliding scale  every 6 hours  levothyroxine Injectable 87.5 <User Schedule>  melatonin Liquid 3 at bedtime  ondansetron Injectable 4 every 8 hours PRN  pantoprazole  Injectable 40 every 12 hours  predniSONE  Solution 5 <User Schedule>  QUEtiapine 12.5 <User Schedule>  simethicone 80 every 6 hours    Vital Signs Last 24 Hrs  T(C): 36.8 (15 Apr 2024 17:38), Max: 36.8 (15 Apr 2024 16:32)  T(F): 98.2 (15 Apr 2024 17:38), Max: 98.3 (15 Apr 2024 16:32)  HR: 87 (15 Apr 2024 17:38) (86 - 110)  BP: 102/63 (15 Apr 2024 17:38) (101/54 - 151/67)  BP(mean): --  RR: 22 (15 Apr 2024 17:38) (18 - 22)  SpO2: 97% (15 Apr 2024 17:38) (97% - 100%)    Parameters below as of 15 Apr 2024 17:38  Patient On (Oxygen Delivery Method): ventilator        PHYSICAL EXAM:  General: NAD, Non-toxic  Neurology: lethargic  Respiratory: Clear to auscultation bilaterally  CV: RRR, S1S2, no murmurs, rubs or gallops  Abdominal: Soft, Non-tender, non-distended, normal bowel sounds  Extremities: generalized edema, + peripheral pulses  Line Sites: Clear  Skin: No rash                          6.9    3.23  )-----------( 113      ( 15 Apr 2024 16:34 )             23.7       04-15    139  |  107  |  <4<L>  ----------------------------<  160<H>  5.5<H>   |  18<L>  |  <0.30<L>    Ca    9.5      15 Apr 2024 16:34  Phos  2.9     04-15  Mg     1.7     04-15    TPro  6.0  /  Alb  2.5<L>  /  TBili  1.6<H>  /  DBili  x   /  AST  30  /  ALT  27  /  AlkPhos  50  04-15    MICROBIOLOGY:  Catheterized Catheterized  04-12-24   No growth  --  --      .Blood Blood-Peripheral  04-12-24   No growth at 48 Hours  --  --      .Blood Blood-Peripheral  04-11-24   No growth at 4 days  --  --      .Blood Blood-Peripheral  04-09-24   No growth at 5 days  --  --      .Sputum Sputum  04-07-24   Mixed gram negative rods Culture includes  Moderate Stenotrophomonas maltophilia  Few Pseudomonas aeruginosa (Carbapenem Resistant)  --  Stenotrophomonas maltophilia  Pseudomonas aeruginosa (Carbapenem Resistant)      .Blood Blood-Peripheral  04-07-24   No growth at 5 days  --  Blood Culture PCR  Serratia marcescens    .Sputum Sputum  04-05-24   Numerous Pseudomonas aeruginosa  Normal Respiratory Shanda present  --  Pseudomonas aeruginosa    .Blood Blood-Peripheral  04-04-24   No growth at 5 days  --  --    Clean Catch Clean Catch (Midstream)  04-03-24   <10,000 CFU/mL Normal Urogenital Shanda  --  --    .Blood Blood-Peripheral  04-02-24   No growth at 5 days  --  --    RADIOLOGY:  < from: CT Abdomen and Pelvis w/ Oral Cont and w/ IV Cont (04.12.24 @ 21:36) >  IMPRESSION:    CHEST:  -Multifocal pneumonia as described above, superimposed on prior findings   of atelectasis. Follow-up to resolution with repeat chest CT in one   month, or sooner as clinically warranted.  -Tracheostomy terminates at the nick, similar to prior imaging. Central   and distal airway secretions and resultant mosaic attenuation of the   aerated lung parenchyma, likely from air trapping.    ABDOMEN/PELVIS:  -Colon is overall nondistended, limiting evaluation. Scattered mild   apparent colonic wall thickening is suggested. Correlate for any signs   and symptoms of persistent colitis.  -Right anterior kidney faint geographic region of low attenuation on   image 138 series 3 is similar to 4/2/2024 CT, of unclear significance.   Correlate for any signs and symptoms of renal infection.  -Gastrostomy tube localized to the stomach. Mild inflammation/thickening   along gastrostomy tube tract is similar to prior imaging. Correlate   clinically.  -Sacrum decubitus ulcer redemonstrated, with increasedskin thickening in   the interim. Correlate clinically.    < end of copied text >      Zion Newman MD; Division of Infectious Disease; Pager: [phone]; nights and weekends: [phone]

## 2024-04-15 NOTE — PROGRESS NOTE ADULT - PROBLEM SELECTOR PLAN 3
intermittently persistent high fevers  - IV Vanco/meropenem x1 on admission for concern for cellulitis  - 4/2 blood cultures negative x2, MRSA/MSSA PCR +  - 4/3 urine culture negative, CT A/O - no infectious focus or colitis  - 4/5 Procal 6.6 increased from 0.08 on 4/3, lactate 4.3  - 4/7 fever, meropenem restarted - no leukocytosis - RVP neg, UA neg, dopplers neg  - 4/7 blood cultures positive for serratia.  Meropenem 4/7 -->4/11,  Cefepime 4/11 --> 4/13  - ID recs appreciated - IV Vanco/meropenem x1 on admission for concern for cellulitis  - 4/2 blood cultures negative x2, MRSA/MSSA PCR +  - 4/3 urine culture nl vinay, CT A/P 4/2: no infectious focus or colitis  - 4/5 Procal 6.6 increased from 0.08 on 4/3, lactate 4.3  - 4/7 Fever, meropenem restarted - no leukocytosis - RVP neg, UA neg, dopplers neg  - 4/7 blood cultures positive for serratia. Txd w/ Meropenem 4/7 -->4/11, transitioned to Cefepime 4/11 --> 4/13  - ID recs appreciated; Continues to follow - Was Being treated for C diff as outpatient with follow up with Infectious disease, Dr Newman   - Completed fidaxomicin (total 10 day course) 4/3 -->4/13  - ID recommends ZinPlava 50mg IV to be given to reduce Cdiff infections (is not carried in hospital)  - Meropenem completed yesterday; will continue enteral Vanco for additional 5 days ( Ends 4/19)   - Infectious diease continues to follow

## 2024-04-15 NOTE — PROGRESS NOTE ADULT - NS ATTEND AMEND GEN_ALL_CORE FT
75 y/o F w/chronic respiratory failure with hypoxia and hypercapnia s/p trach/PEG (vent dependent), RA on prednisone, DM, cerebral aneurysm s/p repair, and recurrent C. Diff infection admitted for dislodged PEG tube.    - Continue mechanical ventilation  - Management of dislodged PEG as per GI/Surgery  - Abx as per ID  - DVT prophylaxis  - DNR/DNI

## 2024-04-15 NOTE — CHART NOTE - NSCHARTNOTEFT_GEN_A_CORE
Patient with decreased h+h on afternoon cbc 6.9/23.7 case d/w  will transfuse 1 unit PRBCs. Will repeat cbc 4 hrs post transfusion with goal Hgb of 8 for tomorrows procedure. Patient scheduled for stoma closure and G-J Tube placement. Patient supplemented potassium ( 3K-riders/ 40 meq via PEG) this afternoon BMP Repeated shortly after k-rider infusion completed Repeat potassium 5.5 Will repeat BMP with CBC to monitor potassium.

## 2024-04-15 NOTE — PROGRESS NOTE ADULT - PROBLEM SELECTOR PLAN 4
- possibly reactive in setting of antibiotics/active infection  - history of AOCD  - ID aware - continue antibiotics  - continue to monitor CBC - Possibly reactive in setting of antibiotics/active infection  - History of AOCD  - Continue to monitor CBC - IV Vanco/meropenem x1 on admission for concern for cellulitis  - 4/2 blood cultures negative x2, MRSA/MSSA PCR +  - 4/3 urine culture nl vinay, CT A/P 4/2: no infectious focus or colitis  - 4/5 Procal 6.6 increased from 0.08 on 4/3, lactate 4.3  - 4/7 Fever, meropenem restarted - no leukocytosis - RVP neg, UA neg, dopplers neg  - 4/7 blood cultures positive for serratia. Txd w/ Meropenem 4/7 -->4/11, transitioned to Cefepime 4/11 --> 4/13  - ID recs appreciated; Continues to follow

## 2024-04-15 NOTE — PROGRESS NOTE ADULT - SUBJECTIVE AND OBJECTIVE BOX
Patient is a 74y old  Female who presents with a chief complaint of Dislodged G Tube (14 Apr 2024 12:24)      Interval Events: No events reported overnight     REVIEW OF SYSTEMS:  [ ] Positive  [ ] All other systems negative  [ ] Unable to assess ROS because ________    Vital Signs Last 24 Hrs  T(C): 36.4 (04-15-24 @ 06:06), Max: 37.2 (04-14-24 @ 10:48)  T(F): 97.6 (04-15-24 @ 06:06), Max: 99 (04-14-24 @ 10:48)  HR: 92 (04-15-24 @ 06:06) (88 - 110)  BP: 151/67 (04-15-24 @ 06:06) (101/54 - 151/67)  RR: 18 (04-15-24 @ 06:06) (18 - 18)  SpO2: 100% (04-15-24 @ 06:06) (99% - 100%)    PHYSICAL EXAM:  HEENT:   [ ]Tracheostomy:  [ ]Pupils equal  [ ]No oral lesions  [ ]Abnormal    SKIN  [ ]No Rash  [ ] Abnormal  [ ] pressure    CARDIAC  [ ]Regular  [ ]Abnormal    PULMONARY  [ ]Bilateral Clear Breath Sounds  [ ]Normal Excursion  [ ]Abnormal    GI  [ ]PEG      [ ] +BS		              [ ]Soft, nondistended, nontender	  [ ]Abnormal    MUSCULOSKELETAL                                   [ ]Bedbound                 [ ]Abnormal    [ ]Ambulatory/OOB to chair                           EXTREMITIES                                         [ ]Normal  [ ]Edema                           NEUROLOGIC  [ ] Normal, non focal  [ ] Focal findings:    PSYCHIATRIC  [ ]Alert and appropriate  [ ] Sedated	 [ ]Agitated    :  Reed: [ ] Yes, if yes: Date of Placement:                   [  ] No    LINES: Central Lines [ ] Yes, if yes: Date of Placement                                     [  ] No    HOSPITAL MEDICATIONS:  MEDICATIONS  (STANDING):  albuterol/ipratropium for Nebulization 3 milliLiter(s) Nebulizer every 6 hours  amLODIPine   Tablet 10 milliGRAM(s) Oral <User Schedule>  artificial tears (preservative free) Ophthalmic Solution 1 Drop(s) Both EYES four times a day  ascorbic acid 500 milliGRAM(s) Oral daily  Biotene Dry Mouth Oral Rinse 5 milliLiter(s) Swish and Spit every 6 hours  chlorhexidine 0.12% Liquid 15 milliLiter(s) Oral Mucosa every 12 hours  chlorhexidine 4% Liquid 1 Application(s) Topical <User Schedule>  erythromycin   Ointment 1 Application(s) Both EYES three times a day  escitalopram 10 milliGRAM(s) Oral <User Schedule>  gabapentin Solution 100 milliGRAM(s) Oral <User Schedule>  insulin glargine Injectable (LANTUS) 5 Unit(s) SubCutaneous <User Schedule>  insulin lispro (ADMELOG) corrective regimen sliding scale   SubCutaneous every 6 hours  lactated ringers. 1000 milliLiter(s) (50 mL/Hr) IV Continuous <Continuous>  levothyroxine Injectable 87.5 MICROGram(s) IV Push <User Schedule>  lidocaine   4% Patch 1 Patch Transdermal at bedtime  lidocaine   4% Patch 1 Patch Transdermal at bedtime  melatonin Liquid 3 milliGRAM(s) Oral at bedtime  multivitamin/minerals/iron Oral Solution (CENTRUM) 15 milliLiter(s) Oral daily  pantoprazole  Injectable 40 milliGRAM(s) IV Push every 12 hours  predniSONE  Solution 5 milliGRAM(s) Oral <User Schedule>  QUEtiapine 12.5 milliGRAM(s) Oral <User Schedule>  simethicone 80 milliGRAM(s) Chew every 12 hours  vancomycin    Solution 125 milliGRAM(s) Oral every 12 hours    MEDICATIONS  (PRN):  calamine/zinc oxide Lotion 1 Application(s) Topical daily PRN Rash and/or Itching  HYDROmorphone  Injectable 1 milliGRAM(s) IV Push every 6 hours PRN Severe Pain (7 - 10)  HYDROmorphone  Injectable 0.5 milliGRAM(s) IV Push every 4 hours PRN Moderate Pain (4 - 6)  ondansetron Injectable 4 milliGRAM(s) IV Push every 8 hours PRN Nausea and/or Vomiting  sodium chloride 0.65% Nasal 1 Spray(s) Both Nostrils every 12 hours PRN Congestion      LABS:                        7.1    5.19  )-----------( 104      ( 14 Apr 2024 09:13 )             25.2     04-14    137  |  103  |  <4<L>  ----------------------------<  106<H>  3.2<L>   |  18<L>  |  <0.30<L>    Ca    9.7      14 Apr 2024 09:13  Phos  3.1     04-14  Mg     1.7     04-14    TPro  5.5<L>  /  Alb  2.3<L>  /  TBili  2.0<H>  /  DBili  x   /  AST  26  /  ALT  19  /  AlkPhos  44  04-14    PT/INR - ( 14 Apr 2024 09:13 )   PT: 18.8 sec;   INR: 1.82 ratio         PTT - ( 14 Apr 2024 09:13 )  PTT:36.4 sec  Urinalysis Basic - ( 14 Apr 2024 09:13 )    Color: x / Appearance: x / SG: x / pH: x  Gluc: 106 mg/dL / Ketone: x  / Bili: x / Urobili: x   Blood: x / Protein: x / Nitrite: x   Leuk Esterase: x / RBC: x / WBC x   Sq Epi: x / Non Sq Epi: x / Bacteria: x          CAPILLARY BLOOD GLUCOSE    MICROBIOLOGY:     RADIOLOGY:  [ ] Reviewed and interpreted by me    Mode: AC/ CMV (Assist Control/ Continuous Mandatory Ventilation)  RR (machine): 12  TV (machine): 350  FiO2: 30  PEEP: 5  PS: 30  ITime: 1  MAP: 11  PIP: 25   Patient is a 74y old  Female who presents with a chief complaint of Dislodged G Tube (14 Apr 2024 12:24)      Interval Events: No events reported overnight     REVIEW OF SYSTEMS:  [x] Positive; C/o Back pain   [ ] All other systems negative  [ ] Unable to assess ROS because ________    Vital Signs Last 24 Hrs  T(C): 36.4 (04-15-24 @ 06:06), Max: 37.2 (04-14-24 @ 10:48)  T(F): 97.6 (04-15-24 @ 06:06), Max: 99 (04-14-24 @ 10:48)  HR: 92 (04-15-24 @ 06:06) (88 - 110)  BP: 151/67 (04-15-24 @ 06:06) (101/54 - 151/67)  RR: 18 (04-15-24 @ 06:06) (18 - 18)  SpO2: 100% (04-15-24 @ 06:06) (99% - 100%)    PHYSICAL EXAM:  HEENT:   [x]Tracheostomy: #8 distal XLT cuffed shiley   [x]Pupils equal  [ ]No oral lesions  [x]Abnormal: Sclera erythema     SKIN  [ ]No Rash  [ ] Abnormal  [x] pressure: Sacral/ Buttock DTI    CARDIAC  [x]Regular  [ ]Abnormal    PULMONARY  [x]Bilateral Coarse Breath Sounds  [ ]Normal Excursion  [ ]Abnormal    GI  [x] PEG Stoma site w/o erythema with clean dry dressing this morning    [x] +BS		              [x]Soft, nondistended, nontender	  [x]Abnormal: Rectal tube in place     MUSCULOSKELETAL                                   [x]Bedbound                 [ ]Abnormal    [ ]Ambulatory/OOB to chair                           EXTREMITIES                                         [x]Normal  [ ]Edema                           NEUROLOGIC  [ ] Normal, non focal  [x] Focal findings: awake and alert, follows commands on all extremities    PSYCHIATRIC  [x]Alert and appropriate  [ ] Sedated	 [ ]Agitated    :  Mcbride: [ ] Yes, if yes: Date of Placement:                   [x] No    LINES: Central Lines [ ] Yes, if yes: Date of Placement                                     [x] No    HOSPITAL MEDICATIONS:  MEDICATIONS  (STANDING):  albuterol/ipratropium for Nebulization 3 milliLiter(s) Nebulizer every 6 hours  amLODIPine   Tablet 10 milliGRAM(s) Oral <User Schedule>  artificial tears (preservative free) Ophthalmic Solution 1 Drop(s) Both EYES four times a day  ascorbic acid 500 milliGRAM(s) Oral daily  Biotene Dry Mouth Oral Rinse 5 milliLiter(s) Swish and Spit every 6 hours  chlorhexidine 0.12% Liquid 15 milliLiter(s) Oral Mucosa every 12 hours  chlorhexidine 4% Liquid 1 Application(s) Topical <User Schedule>  erythromycin   Ointment 1 Application(s) Both EYES three times a day  escitalopram 10 milliGRAM(s) Oral <User Schedule>  gabapentin Solution 100 milliGRAM(s) Oral <User Schedule>  insulin glargine Injectable (LANTUS) 5 Unit(s) SubCutaneous <User Schedule>  insulin lispro (ADMELOG) corrective regimen sliding scale   SubCutaneous every 6 hours  lactated ringers. 1000 milliLiter(s) (50 mL/Hr) IV Continuous <Continuous>  levothyroxine Injectable 87.5 MICROGram(s) IV Push <User Schedule>  lidocaine   4% Patch 1 Patch Transdermal at bedtime  lidocaine   4% Patch 1 Patch Transdermal at bedtime  melatonin Liquid 3 milliGRAM(s) Oral at bedtime  multivitamin/minerals/iron Oral Solution (CENTRUM) 15 milliLiter(s) Oral daily  pantoprazole  Injectable 40 milliGRAM(s) IV Push every 12 hours  predniSONE  Solution 5 milliGRAM(s) Oral <User Schedule>  QUEtiapine 12.5 milliGRAM(s) Oral <User Schedule>  simethicone 80 milliGRAM(s) Chew every 12 hours  vancomycin    Solution 125 milliGRAM(s) Oral every 12 hours    MEDICATIONS  (PRN):  calamine/zinc oxide Lotion 1 Application(s) Topical daily PRN Rash and/or Itching  HYDROmorphone  Injectable 1 milliGRAM(s) IV Push every 6 hours PRN Severe Pain (7 - 10)  HYDROmorphone  Injectable 0.5 milliGRAM(s) IV Push every 4 hours PRN Moderate Pain (4 - 6)  ondansetron Injectable 4 milliGRAM(s) IV Push every 8 hours PRN Nausea and/or Vomiting  sodium chloride 0.65% Nasal 1 Spray(s) Both Nostrils every 12 hours PRN Congestion      LABS:                        7.1    5.19  )-----------( 104      ( 14 Apr 2024 09:13 )             25.2     04-14    137  |  103  |  <4<L>  ----------------------------<  106<H>  3.2<L>   |  18<L>  |  <0.30<L>    Ca    9.7      14 Apr 2024 09:13  Phos  3.1     04-14  Mg     1.7     04-14    TPro  5.5<L>  /  Alb  2.3<L>  /  TBili  2.0<H>  /  DBili  x   /  AST  26  /  ALT  19  /  AlkPhos  44  04-14    PT/INR - ( 14 Apr 2024 09:13 )   PT: 18.8 sec;   INR: 1.82 ratio         PTT - ( 14 Apr 2024 09:13 )  PTT:36.4 sec  Urinalysis Basic - ( 14 Apr 2024 09:13 )    Color: x / Appearance: x / SG: x / pH: x  Gluc: 106 mg/dL / Ketone: x  / Bili: x / Urobili: x   Blood: x / Protein: x / Nitrite: x   Leuk Esterase: x / RBC: x / WBC x   Sq Epi: x / Non Sq Epi: x / Bacteria: x          CAPILLARY BLOOD GLUCOSE    MICROBIOLOGY:     RADIOLOGY:  [ ] Reviewed and interpreted by me    Mode: AC/ CMV (Assist Control/ Continuous Mandatory Ventilation)  RR (machine): 12  TV (machine): 350  FiO2: 30  PEEP: 5  PS: 30  ITime: 1  MAP: 11  PIP: 25   Patient is a 74y old  Female who presents with a chief complaint of Dislodged G Tube (14 Apr 2024 12:24)      Interval Events: No events reported overnight     REVIEW OF SYSTEMS:  [x] Positive; C/o Back pain   [ ] All other systems negative  [ ] Unable to assess ROS because ________    Vital Signs Last 24 Hrs  T(C): 36.4 (04-15-24 @ 06:06), Max: 37.2 (04-14-24 @ 10:48)  T(F): 97.6 (04-15-24 @ 06:06), Max: 99 (04-14-24 @ 10:48)  HR: 92 (04-15-24 @ 06:06) (88 - 110)  BP: 151/67 (04-15-24 @ 06:06) (101/54 - 151/67)  RR: 18 (04-15-24 @ 06:06) (18 - 18)  SpO2: 100% (04-15-24 @ 06:06) (99% - 100%)    PHYSICAL EXAM:  HEENT:   [x]Tracheostomy: #8 distal XLT cuffed Shiley   [x]Pupils equal  [ ]No oral lesions  [x]Abnormal: Sclera erythema     SKIN  [ ]No Rash  [ ] Abnormal  [x] pressure: Sacral/ Buttock DTI    CARDIAC  [x]Regular  [ ]Abnormal    PULMONARY  [x]Bilateral Coarse Breath Sounds  [ ]Normal Excursion  [ ]Abnormal    GI  [x] PEG Stoma site w/o erythema with clean dry dressing this morning    [x] +BS		              [x]Soft, nondistended, nontender	  [x]Abnormal: Rectal tube in place     MUSCULOSKELETAL                                   [x]Bedbound                 [ ]Abnormal    [ ]Ambulatory/OOB to chair                           EXTREMITIES                                         [x]Normal  [ ]Edema                           NEUROLOGIC  [ ] Normal, non focal  [x] Focal findings: awake and alert, follows commands on all extremities    PSYCHIATRIC  [x]Alert and appropriate  [ ] Sedated	 [ ]Agitated    :  Mcbride: [ ] Yes, if yes: Date of Placement:                   [x] No    LINES: Central Lines [ ] Yes, if yes: Date of Placement                                     [x] No    HOSPITAL MEDICATIONS:  MEDICATIONS  (STANDING):  albuterol/ipratropium for Nebulization 3 milliLiter(s) Nebulizer every 6 hours  amLODIPine   Tablet 10 milliGRAM(s) Oral <User Schedule>  artificial tears (preservative free) Ophthalmic Solution 1 Drop(s) Both EYES four times a day  ascorbic acid 500 milliGRAM(s) Oral daily  Biotene Dry Mouth Oral Rinse 5 milliLiter(s) Swish and Spit every 6 hours  chlorhexidine 0.12% Liquid 15 milliLiter(s) Oral Mucosa every 12 hours  chlorhexidine 4% Liquid 1 Application(s) Topical <User Schedule>  erythromycin   Ointment 1 Application(s) Both EYES three times a day  escitalopram 10 milliGRAM(s) Oral <User Schedule>  gabapentin Solution 100 milliGRAM(s) Oral <User Schedule>  insulin glargine Injectable (LANTUS) 5 Unit(s) SubCutaneous <User Schedule>  insulin lispro (ADMELOG) corrective regimen sliding scale   SubCutaneous every 6 hours  lactated ringers. 1000 milliLiter(s) (50 mL/Hr) IV Continuous <Continuous>  levothyroxine Injectable 87.5 MICROGram(s) IV Push <User Schedule>  lidocaine   4% Patch 1 Patch Transdermal at bedtime  lidocaine   4% Patch 1 Patch Transdermal at bedtime  melatonin Liquid 3 milliGRAM(s) Oral at bedtime  multivitamin/minerals/iron Oral Solution (CENTRUM) 15 milliLiter(s) Oral daily  pantoprazole  Injectable 40 milliGRAM(s) IV Push every 12 hours  predniSONE  Solution 5 milliGRAM(s) Oral <User Schedule>  QUEtiapine 12.5 milliGRAM(s) Oral <User Schedule>  simethicone 80 milliGRAM(s) Chew every 12 hours  vancomycin    Solution 125 milliGRAM(s) Oral every 12 hours    MEDICATIONS  (PRN):  calamine/zinc oxide Lotion 1 Application(s) Topical daily PRN Rash and/or Itching  HYDROmorphone  Injectable 1 milliGRAM(s) IV Push every 6 hours PRN Severe Pain (7 - 10)  HYDROmorphone  Injectable 0.5 milliGRAM(s) IV Push every 4 hours PRN Moderate Pain (4 - 6)  ondansetron Injectable 4 milliGRAM(s) IV Push every 8 hours PRN Nausea and/or Vomiting  sodium chloride 0.65% Nasal 1 Spray(s) Both Nostrils every 12 hours PRN Congestion      LABS:                        7.1    5.19  )-----------( 104      ( 14 Apr 2024 09:13 )             25.2     04-14    137  |  103  |  <4<L>  ----------------------------<  106<H>  3.2<L>   |  18<L>  |  <0.30<L>    Ca    9.7      14 Apr 2024 09:13  Phos  3.1     04-14  Mg     1.7     04-14    TPro  5.5<L>  /  Alb  2.3<L>  /  TBili  2.0<H>  /  DBili  x   /  AST  26  /  ALT  19  /  AlkPhos  44  04-14    PT/INR - ( 14 Apr 2024 09:13 )   PT: 18.8 sec;   INR: 1.82 ratio         PTT - ( 14 Apr 2024 09:13 )  PTT:36.4 sec  Urinalysis Basic - ( 14 Apr 2024 09:13 )    Color: x / Appearance: x / SG: x / pH: x  Gluc: 106 mg/dL / Ketone: x  / Bili: x / Urobili: x   Blood: x / Protein: x / Nitrite: x   Leuk Esterase: x / RBC: x / WBC x   Sq Epi: x / Non Sq Epi: x / Bacteria: x          CAPILLARY BLOOD GLUCOSE    MICROBIOLOGY:     RADIOLOGY:  [ ] Reviewed and interpreted by me    Mode: AC/ CMV (Assist Control/ Continuous Mandatory Ventilation)  RR (machine): 12  TV (machine): 350  FiO2: 30  PEEP: 5  PS: 30  ITime: 1  MAP: 11  PIP: 25

## 2024-04-15 NOTE — PROGRESS NOTE ADULT - ASSESSMENT
73 yo F PMH T2DM on insulin, HTN, HLD, hypothyroidism, RA on Prednisone, Fibromyalgia, cerebral aneurysm s/p repair, chronic hypercapnic respiratory failure s/p trach (vent dependent) and Chronic PEG 2022 , recurrent C.diff infection (follows with Dr. Newman) , bedbound who presented from home with complaints of possible G tube dislodgement and redness around the site. Had CT Abdomen/Pelvis done showing dislodgement of G tube with balloon in the anterior abdominal wall with air filled track to stomach. Admitted to MICU for ventilator management and given vancomycin/meropenem empirically for treatment of cellulitis. Per family patient has had Known c diff infection for past 2-3 weeks.  Treating with Fidaxomicin.  IR team exchanged PEG on 4/3 however later in the day it remained with leakage.  IR Attending adjusted PEG at bedside on 4/4 and PEG remained with moderate amount of PEG leakage once feeds initiated at reduced rate.  GI team re-consulted as second opinion for PEG management.  On 4/9 PEG was found out of place, a emerson catheter was placed in the stoma to maintain patency.  Patient was planned for PEG revision with both Surgery and GI team involved on 4/11 however patient had fevers up to 102F and procedure was cancelled for infectious work-up.  Antibiotics then switched from Meropenem to Cefepime.  An 18 Fr PEG tube placed at bedside on 4/12 (original PEG size was 20Fr) as stoma site appears to have closed.      4/14: 73 yo F PMH T2DM on insulin, HTN, HLD, hypothyroidism, RA on Prednisone, Fibromyalgia, cerebral aneurysm s/p repair, chronic hypercapnic respiratory failure s/p trach (vent dependent) and Chronic PEG 2022 , recurrent C.diff infection (follows with Dr. Newman) , bedbound who presented from home with complaints of possible G tube dislodgement and redness around the site. Had CT Abdomen/Pelvis done showing dislodgement of G tube with balloon in the anterior abdominal wall with air filled track to stomach. Admitted to MICU for ventilator management and given vancomycin/meropenem empirically for treatment of cellulitis. Per family patient has had Known c diff infection for past 2-3 weeks.  Treating with Fidaxomicin.  IR team exchanged PEG on 4/3 however later in the day it remained with leakage.  IR Attending adjusted PEG at bedside on 4/4 and PEG remained with moderate amount of PEG leakage once feeds initiated at reduced rate.  GI team re-consulted as second opinion for PEG management.  On 4/9 PEG was found out of place, a emerson catheter was placed in the stoma to maintain patency.  Patient was planned for PEG revision with both Surgery and GI team involved on 4/11 however patient had fevers up to 102F and procedure was cancelled for infectious work-up.  Antibiotics then switched from Meropenem to Cefepime.  An 18 Fr PEG tube placed at bedside on 4/12 (original PEG size was 20Fr) as stoma site appears to have closed.    4/15: Trickle feeds attempted yesterday ( Failed), patient with leakage of GI Contents. Will continue to use PEG for meds in the interim. Case d/w GI plan for noninvasive closure with Surgery and GI in the endoscopy suite. Patient completed course of Meropenem yesterday; repeat blood cxs sent this morning.   75 yo F PMH T2DM on insulin, HTN, HLD, hypothyroidism, RA on Prednisone, Fibromyalgia, cerebral aneurysm s/p repair, chronic hypercapnic respiratory failure s/p trach (vent dependent) and Chronic PEG 2022 , recurrent C.diff infection (follows with Dr. Newman) , bedbound who presented from home with complaints of possible G tube dislodgement and redness around the site. Had CT Abdomen/Pelvis done showing dislodgement of G tube with balloon in the anterior abdominal wall with air filled track to stomach. Admitted to MICU for ventilator management and given vancomycin/meropenem empirically for treatment of cellulitis. Per family patient has had Known c diff infection for past 2-3 weeks.  Treating with Fidaxomicin.  IR team exchanged PEG on 4/3 however later in the day it remained with leakage.  IR Attending adjusted PEG at bedside on 4/4 and PEG remained with moderate amount of PEG leakage once feeds initiated at reduced rate.  GI team re-consulted as second opinion for PEG management.  On 4/9 PEG was found out of place, a emerson catheter was placed in the stoma to maintain patency.  Patient was planned for PEG revision with both Surgery and GI team involved on 4/11 however patient had fevers up to 102F and procedure was cancelled for infectious work-up.  Antibiotics then switched from Meropenem to Cefepime.  An 18 Fr PEG tube placed at bedside on 4/12 (original PEG size was 20Fr) as stoma site appears to have closed.    4/15: Trickle feeds attempted yesterday ( Failed), patient with leakage of GI Contents. Will continue to use PEG for meds in the interim. Case d/w GI plan for noninvasive closure with Surgery and GI in the endoscopy suite. Patient completed course of Meropenem yesterday; repeat blood cxs sent this morning. Patient remains with hypokalemia will give Potassium 40 meq via PEG and 3K-riders.  73 yo F PMH T2DM on insulin, HTN, HLD, hypothyroidism, RA on Prednisone, Fibromyalgia, cerebral aneurysm s/p repair, chronic hypercapnic respiratory failure s/p trach (vent dependent) and Chronic PEG 2022 , recurrent C.diff infection (follows with Dr. Newman) , bedbound who presented from home with complaints of possible G tube dislodgement and redness around the site. Had CT Abdomen/Pelvis done showing dislodgement of G tube with balloon in the anterior abdominal wall with air filled track to stomach. Admitted to MICU for ventilator management and given vancomycin/meropenem empirically for treatment of cellulitis. Per family patient has had Known c diff infection for past 2-3 weeks.  Treating with Fidaxomicin.  IR team exchanged PEG on 4/3 however later in the day it remained with leakage.  IR Attending adjusted PEG at bedside on 4/4 and PEG remained with moderate amount of PEG leakage once feeds initiated at reduced rate.  GI team re-consulted as second opinion for PEG management.  On 4/9 PEG was found out of place, a emerson catheter was placed in the stoma to maintain patency.  Patient was planned for PEG revision with both Surgery and GI team involved on 4/11 however patient had fevers up to 102F and procedure was cancelled for infectious work-up.  Antibiotics then switched from Meropenem to Cefepime.  An 18 Fr PEG tube placed at bedside on 4/12 (original PEG size was 20Fr) as stoma site appears to have closed.    4/15: Trickle feeds attempted yesterday ( Failed), patient with leakage of GI Contents. Will continue to use PEG for meds in the interim. Case d/w GI plan for noninvasive closure with Surgery and GI in the endoscopy suite tomorrow 4/16. Patient completed course of Meropenem yesterday; repeat blood cxs sent this morning. Patient remains with hypokalemia will give Potassium 40 meq via PEG and 3K-riders.

## 2024-04-15 NOTE — PROGRESS NOTE ADULT - PROBLEM SELECTOR PLAN 6
Sacral wound present on admission   - wound care recs appreciated - Sacral wound present on admission   - Wound Care team continues to follow  - Frequent turning and repositioning - Possibly reactive in setting of antibiotics/active infection  - History of AOCD  - Continue to monitor CBC

## 2024-04-15 NOTE — PROGRESS NOTE ADULT - PROBLEM SELECTOR PLAN 8
- Daughter interested in patient having pleasure feeds by mouth.  - Daughter states patient was cleared by outpatient Pulmonologist to have small amounts of liquids orally  - Daughter informed that patient will need swallow evaluation while inpatient to assess aspiration risk   - Previously Explained that a swallow evaluation would further inform her of appropriate texture to decrease risk of aspiration if patient is appropriate. However if patient fails evaluation and is not cleared for an oral diet  - Daughter would be assuming risks of aspiration complications if she chooses to feed patient by mouth  - Once patient is more stable the plan is to proceed with FEESST  - Daughter provided with medical update at bedside this morning by [facility] Staff  - Code Status: DNR

## 2024-04-15 NOTE — PROGRESS NOTE ADULT - PROBLEM SELECTOR PLAN 2
Being treated for C diff as outpatient with follow up with Infectious disease, Dr Newman   - completed fidaxomicin (total 10 day course) 4/3 -->4/13  - ID recommends ZinPlava 50mg IV to be given to reduce Cdiff infections (is not carried in hospital)  - continue with PO vanco (started 4/8) until completion of Cefepime on 4/13  - Infectious diease consult appreciated - Was Being treated for C diff as outpatient with follow up with Infectious disease, Dr Newman   - Completed fidaxomicin (total 10 day course) 4/3 -->4/13  - ID recommends ZinPlava 50mg IV to be given to reduce Cdiff infections (is not carried in hospital)  - Meropenem completed yesterday; will continue enteral Vanco for additional 5 days ( Ends 4/19)   - Infectious diease continues to follow 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 since 10/2022   - S/p Trach # 8 Distal XLT Shiley Cuffed   - Home Vent: volume /12/30%/+5     - Settings changed on 4/11 to 350/12/30%/+5 as she complained of dyspnea  - PS trials as tolerated.  - Chest PT, Duonebs q 6 hrs

## 2024-04-15 NOTE — PROGRESS NOTE ADULT - PROBLEM SELECTOR PLAN 1
PEG tube dislodgement on admission   - S/p CT abd/pelvis showing dislodgement of G tube with balloon in the anterior abdominal wall with air filled track to stomach  - initially GI and Surgery consulted   - S/p bedside exchange by IR on 4/3- # 20 Fr Kangaroo EnFit placed   - 4/4: PEG leaking.  Feeds held.  IR adjusted PEG at bedside. G-tube study pending to exclude extravasation. Patient remains NPO except for meds, on IVF, LR at 50ml/hr.  - 4/5: PEG still leaking, adjusted at bedside by IR Attending.  - 4/6: PEG still leaking, IR informed, planned for PEG revision on 4/8 or 4/9  - 4/8-4/9 IR has aborted procedure - GI Dr. Cleaning has been brought onto case for new PEG  - 4/9 PEG fell out spontaneously, emerson placed into tract - assessed by Dr. Cleaning  - 4/12: Emerson replaced with 18Fr PEG at bedside, bumper at 8cm (intentionally loose) - CT A/P done   - 4/13 PEG ok to meds - Patient p/w PEG tube dislodgement on admission   - CT A/P 4/2: Dislodged G tube with balloon in the anterior abdominal wall with air filled track to stomach  - S/p bedside exchange by IR on 4/3 ( # 20 Fr Kangaroo EnFit placed )  - 4/4: PEG leaking; IR adjusted PEG at bedside  - 4/5: PEG still leaking, adjusted at bedside by IR Attending.  - 4/6: PEG still leaking, IR informed, planned for PEG revision on 4/8 or 4/9  - 4/8-4/9 IR aborted procedure; GI Dr. Cleaning consulted for new PEG  - 4/9 PEG fell out spontaneously, Emerson placed into tract   - 4/12: Emerson replaced with 18Fr PEG at bedside, bumper at 8cm (intentionally loose)  - CT A/P 4/12: G-tube in the stomach / Mild inflammation thickening along tract   - NPO @ Midnight for Non-invasive Closure of Stoma and PEG replacement by GI and Surgery    - 4/9 PEG fell out spontaneously, emerson placed into tract - assessed by Dr. Cleaning  - 4/12: Emerson replaced with 18Fr PEG at bedside, bumper at 8cm (intentionally loose) - CT A/P done   - 4/13 PEG ok to meds - Patient p/w PEG tube dislodgement on admission   - CT A/P 4/2: Dislodged G tube with balloon in the anterior abdominal wall with air filled track to stomach  - S/p bedside exchange by IR on 4/3 ( # 20 Fr Kangaroo EnFit placed )  - 4/4: PEG leaking; IR adjusted PEG at bedside  - 4/5: PEG still leaking, adjusted at bedside by IR Attending.  - 4/6: PEG still leaking, IR informed, planned for PEG revision on 4/8 or 4/9  - 4/8-4/9 IR aborted procedure; GI Dr. Cleaning consulted for new PEG  - 4/9 PEG fell out spontaneously, Mcbride placed into tract   - 4/12: Mcbride replaced with 18Fr PEG at bedside, bumper at 8cm (intentionally loose)  - CT A/P 4/12: G-tube in the stomach / Mild inflammation thickening along tract   - NPO @ Midnight for Non-invasive Closure of Stoma and PEG replacement by GI and Surgery - Patient p/w PEG tube dislodgement on admission   - CT A/P 4/2: Dislodged G tube with balloon in the anterior abdominal wall with air filled track to stomach  - S/p bedside exchange by IR on 4/3 ( # 20 Fr Kangaroo EnFit placed )  - 4/4: PEG leaking; IR adjusted PEG at bedside  - 4/5: PEG still leaking, adjusted at bedside by IR Attending.  - 4/8-4/9 IR aborted procedure; GI Dr. Cleaning consulted for new PEG  - 4/9 PEG fell out spontaneously, Mcbride placed into tract   - 4/12: Mcbride replaced with 18Fr PEG at bedside, bumper at 8cm (intentionally loose)  - CT A/P 4/12: G-tube in the stomach / Mild inflammation thickening along tract   - NPO @ Midnight for Non-invasive Closure of Stoma and PEG replacement by GI and Surgery

## 2024-04-15 NOTE — PROVIDER CONTACT NOTE (CRITICAL VALUE NOTIFICATION) - SITUATION
HGB 6.9
pt had a potassium level of 2.8
Bld CX from April 4/7/24 positive growth in aerobic bottle gram variable rods  .  Identification available in 3-5 hrs

## 2024-04-15 NOTE — PROVIDER CONTACT NOTE (CRITICAL VALUE NOTIFICATION) - TEST AND RESULT REPORTED:
PO2 is 47
HGB 6.9
Potassium: 2.8
Bld CX from April 4/7/24 positive growth in aerobic bottle gram variable rods  > Identification available in 3-5 hrs

## 2024-04-15 NOTE — PROGRESS NOTE ADULT - PROBLEM SELECTOR PLAN 7
- As discussed with patient's daughter, Marysol, upon admission, she is interested in patient having pleasure feeds by mouth.  Daughter states patient was cleared by outpatient Pulmonologist to have small amounts of liquids orally.  Daughter informed that patient will need swallow evaluation while inpatient to assess aspiration risk.  Explained that a swallow evaluation would further inform her of appropriate texture to decrease risk of aspiration if patient is appropriate.  However if patient fails evaluation and is not cleared for an oral diet, daughter would be assuming risks of aspiration complications if she chooses to feed patient by mouth.  Once patient is more stable the plan is to proceed with FEESST. - Daughter interested in patient having pleasure feeds by mouth.  - Daughter states patient was cleared by outpatient Pulmonologist to have small amounts of liquids orally  - Daughter informed that patient will need swallow evaluation while inpatient to assess aspiration risk   - Previously Explained that a swallow evaluation would further inform her of appropriate texture to decrease risk of aspiration if patient is appropriate. However if patient fails evaluation and is not cleared for an oral diet  - Daughter would be assuming risks of aspiration complications if she chooses to feed patient by mouth  - Once patient is more stable the plan is to proceed with FEESST  - Daughter provided with medical update at bedside this morning by [facility] Staff - Sacral wound present on admission   - Wound Care team continues to follow  - Frequent turning and repositioning

## 2024-04-16 LAB
ADD ON TEST-SPECIMEN IN LAB: SIGNIFICANT CHANGE UP
ALBUMIN SERPL ELPH-MCNC: 2.6 G/DL — LOW (ref 3.3–5)
ALP SERPL-CCNC: 49 U/L — SIGNIFICANT CHANGE UP (ref 40–120)
ALT FLD-CCNC: 28 U/L — SIGNIFICANT CHANGE UP (ref 10–45)
ANION GAP SERPL CALC-SCNC: 12 MMOL/L — SIGNIFICANT CHANGE UP (ref 5–17)
APTT BLD: 35.8 SEC — HIGH (ref 24.5–35.6)
AST SERPL-CCNC: 28 U/L — SIGNIFICANT CHANGE UP (ref 10–40)
BASOPHILS # BLD AUTO: 0.02 K/UL — SIGNIFICANT CHANGE UP (ref 0–0.2)
BASOPHILS NFR BLD AUTO: 0.6 % — SIGNIFICANT CHANGE UP (ref 0–2)
BILIRUB SERPL-MCNC: 2.8 MG/DL — HIGH (ref 0.2–1.2)
BUN SERPL-MCNC: <4 MG/DL — LOW (ref 7–23)
CALCIUM SERPL-MCNC: 9.4 MG/DL — SIGNIFICANT CHANGE UP (ref 8.4–10.5)
CHLORIDE SERPL-SCNC: 106 MMOL/L — SIGNIFICANT CHANGE UP (ref 96–108)
CO2 SERPL-SCNC: 20 MMOL/L — LOW (ref 22–31)
CREAT SERPL-MCNC: <0.3 MG/DL — LOW (ref 0.5–1.3)
CULTURE RESULTS: SIGNIFICANT CHANGE UP
EGFR: 113 ML/MIN/1.73M2 — SIGNIFICANT CHANGE UP
EOSINOPHIL # BLD AUTO: 0.01 K/UL — SIGNIFICANT CHANGE UP (ref 0–0.5)
EOSINOPHIL NFR BLD AUTO: 0.3 % — SIGNIFICANT CHANGE UP (ref 0–6)
GLUCOSE BLDC GLUCOMTR-MCNC: 105 MG/DL — HIGH (ref 70–99)
GLUCOSE BLDC GLUCOMTR-MCNC: 111 MG/DL — HIGH (ref 70–99)
GLUCOSE BLDC GLUCOMTR-MCNC: 117 MG/DL — HIGH (ref 70–99)
GLUCOSE BLDC GLUCOMTR-MCNC: 165 MG/DL — HIGH (ref 70–99)
GLUCOSE SERPL-MCNC: 119 MG/DL — HIGH (ref 70–99)
HCT VFR BLD CALC: 28.9 % — LOW (ref 34.5–45)
HGB BLD-MCNC: 8.9 G/DL — LOW (ref 11.5–15.5)
IMM GRANULOCYTES NFR BLD AUTO: 5.1 % — HIGH (ref 0–0.9)
INR BLD: 1.4 RATIO — HIGH (ref 0.85–1.18)
LDH SERPL L TO P-CCNC: 226 U/L — SIGNIFICANT CHANGE UP (ref 50–242)
LYMPHOCYTES # BLD AUTO: 0.88 K/UL — LOW (ref 1–3.3)
LYMPHOCYTES # BLD AUTO: 26.4 % — SIGNIFICANT CHANGE UP (ref 13–44)
MAGNESIUM SERPL-MCNC: 1.4 MG/DL — LOW (ref 1.6–2.6)
MCHC RBC-ENTMCNC: 26.9 PG — LOW (ref 27–34)
MCHC RBC-ENTMCNC: 30.8 GM/DL — LOW (ref 32–36)
MCV RBC AUTO: 87.3 FL — SIGNIFICANT CHANGE UP (ref 80–100)
MONOCYTES # BLD AUTO: 0.28 K/UL — SIGNIFICANT CHANGE UP (ref 0–0.9)
MONOCYTES NFR BLD AUTO: 8.4 % — SIGNIFICANT CHANGE UP (ref 2–14)
NEUTROPHILS # BLD AUTO: 1.97 K/UL — SIGNIFICANT CHANGE UP (ref 1.8–7.4)
NEUTROPHILS NFR BLD AUTO: 59.2 % — SIGNIFICANT CHANGE UP (ref 43–77)
NRBC # BLD: 0 /100 WBCS — SIGNIFICANT CHANGE UP (ref 0–0)
PHOSPHATE SERPL-MCNC: 2.8 MG/DL — SIGNIFICANT CHANGE UP (ref 2.5–4.5)
PLATELET # BLD AUTO: 97 K/UL — LOW (ref 150–400)
POTASSIUM SERPL-MCNC: 4.5 MMOL/L — SIGNIFICANT CHANGE UP (ref 3.5–5.3)
POTASSIUM SERPL-SCNC: 4.5 MMOL/L — SIGNIFICANT CHANGE UP (ref 3.5–5.3)
PROT SERPL-MCNC: 5.8 G/DL — LOW (ref 6–8.3)
PROTHROM AB SERPL-ACNC: 15.3 SEC — HIGH (ref 9.5–13)
RBC # BLD: 3.31 M/UL — LOW (ref 3.8–5.2)
RBC # BLD: 3.43 M/UL — LOW (ref 3.8–5.2)
RBC # FLD: 18.8 % — HIGH (ref 10.3–14.5)
RETICS #: 106.7 K/UL — SIGNIFICANT CHANGE UP (ref 25–125)
RETICS/RBC NFR: 3.1 % — HIGH (ref 0.5–2.5)
SODIUM SERPL-SCNC: 138 MMOL/L — SIGNIFICANT CHANGE UP (ref 135–145)
SPECIMEN SOURCE: SIGNIFICANT CHANGE UP
WBC # BLD: 3.18 K/UL — LOW (ref 3.8–10.5)
WBC # FLD AUTO: 3.18 K/UL — LOW (ref 3.8–10.5)

## 2024-04-16 PROCEDURE — 49446 CHANGE G-TUBE TO G-J PERC: CPT

## 2024-04-16 PROCEDURE — 43246 EGD PLACE GASTROSTOMY TUBE: CPT | Mod: 62

## 2024-04-16 PROCEDURE — 43870 CLOSURE GASTROSTOMY SURGICAL: CPT | Mod: 62

## 2024-04-16 PROCEDURE — 99232 SBSQ HOSP IP/OBS MODERATE 35: CPT

## 2024-04-16 PROCEDURE — 99223 1ST HOSP IP/OBS HIGH 75: CPT

## 2024-04-16 DEVICE — IMPLANTABLE DEVICE: Type: IMPLANTABLE DEVICE | Status: FUNCTIONAL

## 2024-04-16 DEVICE — CLIP RESOLUTION 360 235CM: Type: IMPLANTABLE DEVICE | Status: FUNCTIONAL

## 2024-04-16 DEVICE — PEG PULL KT 24 FR: Type: IMPLANTABLE DEVICE | Status: FUNCTIONAL

## 2024-04-16 RX ORDER — MAGNESIUM SULFATE 500 MG/ML
2 VIAL (ML) INJECTION ONCE
Refills: 0 | Status: COMPLETED | OUTPATIENT
Start: 2024-04-16 | End: 2024-04-16

## 2024-04-16 RX ADMIN — GABAPENTIN 100 MILLIGRAM(S): 400 CAPSULE ORAL at 21:58

## 2024-04-16 RX ADMIN — SIMETHICONE 80 MILLIGRAM(S): 80 TABLET, CHEWABLE ORAL at 06:09

## 2024-04-16 RX ADMIN — Medication 3 MILLIGRAM(S): at 21:59

## 2024-04-16 RX ADMIN — Medication 25 GRAM(S): at 04:58

## 2024-04-16 RX ADMIN — Medication 125 MILLIGRAM(S): at 06:09

## 2024-04-16 RX ADMIN — Medication 5 MILLIGRAM(S): at 17:56

## 2024-04-16 RX ADMIN — CHLORHEXIDINE GLUCONATE 1 APPLICATION(S): 213 SOLUTION TOPICAL at 06:09

## 2024-04-16 RX ADMIN — LIDOCAINE 1 PATCH: 4 CREAM TOPICAL at 11:30

## 2024-04-16 RX ADMIN — PANTOPRAZOLE SODIUM 40 MILLIGRAM(S): 20 TABLET, DELAYED RELEASE ORAL at 06:08

## 2024-04-16 RX ADMIN — AMLODIPINE BESYLATE 10 MILLIGRAM(S): 2.5 TABLET ORAL at 09:42

## 2024-04-16 RX ADMIN — Medication 1 DROP(S): at 00:19

## 2024-04-16 RX ADMIN — CHLORHEXIDINE GLUCONATE 15 MILLILITER(S): 213 SOLUTION TOPICAL at 17:52

## 2024-04-16 RX ADMIN — SIMETHICONE 80 MILLIGRAM(S): 80 TABLET, CHEWABLE ORAL at 00:19

## 2024-04-16 RX ADMIN — SODIUM CHLORIDE 50 MILLILITER(S): 9 INJECTION, SOLUTION INTRAVENOUS at 16:07

## 2024-04-16 RX ADMIN — Medication 5 MILLILITER(S): at 17:52

## 2024-04-16 RX ADMIN — GABAPENTIN 100 MILLIGRAM(S): 400 CAPSULE ORAL at 09:42

## 2024-04-16 RX ADMIN — Medication 5 MILLILITER(S): at 00:20

## 2024-04-16 RX ADMIN — SIMETHICONE 80 MILLIGRAM(S): 80 TABLET, CHEWABLE ORAL at 17:46

## 2024-04-16 RX ADMIN — Medication 3 MILLILITER(S): at 23:16

## 2024-04-16 RX ADMIN — Medication 1 APPLICATION(S): at 06:09

## 2024-04-16 RX ADMIN — Medication 5 MILLILITER(S): at 06:08

## 2024-04-16 RX ADMIN — HYDROMORPHONE HYDROCHLORIDE 0.5 MILLIGRAM(S): 2 INJECTION INTRAMUSCULAR; INTRAVENOUS; SUBCUTANEOUS at 06:07

## 2024-04-16 RX ADMIN — Medication 87.5 MICROGRAM(S): at 06:09

## 2024-04-16 RX ADMIN — INSULIN GLARGINE 5 UNIT(S): 100 INJECTION, SOLUTION SUBCUTANEOUS at 17:50

## 2024-04-16 RX ADMIN — CHLORHEXIDINE GLUCONATE 15 MILLILITER(S): 213 SOLUTION TOPICAL at 06:08

## 2024-04-16 RX ADMIN — LIDOCAINE 1 PATCH: 4 CREAM TOPICAL at 07:00

## 2024-04-16 RX ADMIN — LIDOCAINE 1 PATCH: 4 CREAM TOPICAL at 22:04

## 2024-04-16 RX ADMIN — PANTOPRAZOLE SODIUM 40 MILLIGRAM(S): 20 TABLET, DELAYED RELEASE ORAL at 17:56

## 2024-04-16 RX ADMIN — Medication 1 DROP(S): at 06:09

## 2024-04-16 RX ADMIN — ESCITALOPRAM OXALATE 10 MILLIGRAM(S): 10 TABLET, FILM COATED ORAL at 17:46

## 2024-04-16 RX ADMIN — Medication 1 APPLICATION(S): at 21:58

## 2024-04-16 RX ADMIN — HYDROMORPHONE HYDROCHLORIDE 0.5 MILLIGRAM(S): 2 INJECTION INTRAMUSCULAR; INTRAVENOUS; SUBCUTANEOUS at 06:40

## 2024-04-16 RX ADMIN — Medication 125 MILLIGRAM(S): at 17:50

## 2024-04-16 RX ADMIN — Medication 1 DROP(S): at 17:49

## 2024-04-16 RX ADMIN — Medication 1 APPLICATION(S): at 22:00

## 2024-04-16 RX ADMIN — Medication 3 MILLILITER(S): at 17:13

## 2024-04-16 RX ADMIN — QUETIAPINE FUMARATE 12.5 MILLIGRAM(S): 200 TABLET, FILM COATED ORAL at 17:47

## 2024-04-16 RX ADMIN — Medication 3 MILLILITER(S): at 05:12

## 2024-04-16 NOTE — PROGRESS NOTE ADULT - ASSESSMENT
75 yo F PMH T2DM on insulin, HTN, HLD, hypothyroidism, RA on Prednisone, Fibromyalgia, cerebral aneurysm s/p repair, chronic hypercapnic respiratory failure s/p trach (vent dependent) and Chronic PEG 2022 , recurrent C.diff infection (follows with Dr. Newman) , bedbound who presented from home with complaints of possible G tube dislodgement and redness around the site. Had CT Abdomen/Pelvis done showing dislodgement of G tube with balloon in the anterior abdominal wall with air filled track to stomach. Admitted to MICU for ventilator management and given vancomycin/meropenem empirically for treatment of cellulitis. Per family patient has had Known c diff infection for past 2-3 weeks.  Treating with Fidaxomicin.  IR team exchanged PEG on 4/3 however later in the day it remained with leakage.  IR Attending adjusted PEG at bedside on 4/4 and PEG remained with moderate amount of PEG leakage once feeds initiated at reduced rate.  GI team re-consulted as second opinion for PEG management.  On 4/9 PEG was found out of place, a emerson catheter was placed in the stoma to maintain patency.  Patient was planned for PEG revision with both Surgery and GI team involved on 4/11 however patient had fevers up to 102F and procedure was cancelled for infectious work-up.  Antibiotics then switched from Meropenem to Cefepime.  An 18 Fr PEG tube placed at bedside on 4/12 (original PEG size was 20Fr) as stoma site appears to have closed.    4/15: Trickle feeds attempted yesterday ( Failed), patient with leakage of GI Contents. Will continue to use PEG for meds in the interim. Case d/w GI plan for noninvasive closure with Surgery and GI in the endoscopy suite tomorrow 4/16. Patient completed course of Meropenem yesterday; repeat blood cxs sent this morning. Patient remains with hypokalemia will give Potassium 40 meq via PEG and 3K-riders.  73 yo F PMH T2DM on insulin, HTN, HLD, hypothyroidism, RA on Prednisone, Fibromyalgia, cerebral aneurysm s/p repair, chronic hypercapnic respiratory failure s/p trach (vent dependent) and Chronic PEG 2022, recurrent C. diff infection (follows with Dr. Newman) , bedbound who presented from home with complaints of possible G tube dislodgement and redness around the site. Had CT Abdomen/Pelvis done showing dislodgement of G tube with balloon in the anterior abdominal wall with air filled track to stomach. Admitted to MICU for ventilator management and given vancomycin/meropenem empirically for treatment of cellulitis. Per family patient has had Known c diff infection for past 2-3 weeks.  Treating with Fidaxomicin.  IR team exchanged PEG on 4/3 however later in the day it remained with leakage.  IR Attending adjusted PEG at bedside on 4/4 and PEG remained with moderate amount of PEG leakage once feeds initiated at reduced rate.  GI team re-consulted as second opinion for PEG management.  On 4/9 PEG was found out of place, a emerson catheter was placed in the stoma to maintain patency.  Patient was planned for PEG revision with both Surgery and GI team involved on 4/11 however patient had fevers up to 102F and procedure was cancelled for infectious work-up.  Antibiotics then switched from Meropenem to Cefepime, completed 4/14.  PEG dislodged, an 18 Fr PEG tube placed at bedside on 4/12 (original PEG size was 20Fr) as stoma site appears to have closed.      4/16:  Required electrolyte repletion yesterday, electrolytes stable on repeat labs.  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  As per discussion with ophtho a rheum consult was recommended for treatment for anterior scleritis as it is a marker for active RA and treatment would be systemic for RA.  Rheum consulted - outpt follow up would be recommended as they don't changes to RA regimens inpatient.  Required 1unit PRBC for hgb 6.9, responded well, repeat 8.9.  Still with thrombocytopenia, and continuing to downtrend - heme/onc consulted.  Anemia workup in progress.  PEG procedure today in endoscopy suite with GI/surgery.  Remains afebrile, blood cultures still pending. 73 yo F PMH T2DM on insulin, HTN, HLD, hypothyroidism, RA on Prednisone, Fibromyalgia, cerebral aneurysm s/p repair, chronic hypercapnic respiratory failure s/p trach (vent dependent) and Chronic PEG 2022, recurrent C. diff infection (follows with Dr. Newman) , bedbound who presented from home with complaints of possible G tube dislodgement and redness around the site. Had CT Abdomen/Pelvis done showing dislodgement of G tube with balloon in the anterior abdominal wall with air filled track to stomach. Admitted to MICU for ventilator management and given vancomycin/meropenem empirically for treatment of cellulitis. Per family patient has had Known c diff infection for past 2-3 weeks.  Treating with Fidaxomicin.  IR team exchanged PEG on 4/3 however later in the day it remained with leakage.  IR Attending adjusted PEG at bedside on 4/4 and PEG remained with moderate amount of PEG leakage once feeds initiated at reduced rate.  GI team re-consulted as second opinion for PEG management.  On 4/9 PEG was found out of place, a emerson catheter was placed in the stoma to maintain patency.  Patient was planned for PEG revision with both Surgery and GI team involved on 4/11 however patient had fevers up to 102F and procedure was cancelled for infectious work-up.  An 18 Fr PEG tube placed at bedside on 4/12 (original PEG size was 20Fr) as stoma site appears to have closed.  Antibiotics were switched from Meropenem to Cefepime, completed 4/14, continued PO vanco for tail end coverage.  Now s/p new PEG-J and closure of gastric fistula with GI and surgery in endo suite on 4/16.      4/16:  Required electrolyte repletion yesterday, electrolytes stable on repeat labs.  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  As per discussion with ophtho a rheum consult was recommended for treatment for anterior scleritis as it is a marker for active RA and treatment would be systemic for RA.  Rheum consulted - outpt follow up would be recommended as they don't changes to RA regimens inpatient.  Required 1unit PRBC for hgb 6.9, responded well, repeat 8.9.  Still with thrombocytopenia, and continuing to downtrend - heme/onc consulted.  Anemia workup in progress.  PEG-J and gastric fistula closure performed today in endoscopy suite with GI/surgery.  Remains afebrile, blood cultures still pending. 73 yo F PMH T2DM on insulin, HTN, HLD, hypothyroidism, RA on Prednisone, Fibromyalgia, cerebral aneurysm s/p repair, chronic hypercapnic respiratory failure s/p trach (vent dependent) and Chronic PEG 2022, recurrent C. diff infection (follows with Dr. Newman) , bedbound who presented from home with complaints of possible G tube dislodgement and redness around the site. Had CT Abdomen/Pelvis done showing dislodgement of G tube with balloon in the anterior abdominal wall with air filled track to stomach. Admitted to MICU for ventilator management and given vancomycin/meropenem empirically for treatment of cellulitis. Per family patient has had Known c diff infection for past 2-3 weeks.  Treating with Fidaxomicin.  IR team exchanged PEG on 4/3 however later in the day it remained with leakage.  IR Attending adjusted PEG at bedside on 4/4 and PEG remained with moderate amount of PEG leakage once feeds initiated at reduced rate.  GI team re-consulted as second opinion for PEG management.  On 4/9 PEG was found out of place, a emerson catheter was placed in the stoma to maintain patency.  Patient was planned for PEG revision with both Surgery and GI team involved on 4/11 however patient had fevers up to 102F and procedure was cancelled for infectious work-up.  An 18 Fr PEG tube placed at bedside on 4/12 (original PEG size was 20Fr) as stoma site appears to have closed.  Antibiotics were switched from Meropenem to Cefepime, completed 4/14, continued PO vanco for tail end coverage.  Now s/p new PEG-J and closure of gastric fistula with GI and surgery in endo suite on 4/16.      4/16:  Required electrolyte repletion yesterday, electrolytes stable on repeat labs.  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  As per discussion with ophtho a rheum consult was recommended for treatment for anterior scleritis as it is a marker for active RA and treatment would be systemic for RA.  Rheum consulted - outpt follow up would be recommended as they don't changes to RA regimens inpatient especially w/ cdiff infection.  Required 1unit PRBC for hgb 6.9, responded well, repeat 8.9.  Still with thrombocytopenia, and continuing to downtrend - heme/onc consulted.  Anemia workup in progress.  PEG-J and gastric fistula closure performed today in endoscopy suite with GI/surgery - ok to use for water and meds for today.  Remains afebrile, blood cultures negative.

## 2024-04-16 NOTE — PROGRESS NOTE ADULT - PROBLEM SELECTOR PLAN 6
- Possibly reactive in setting of antibiotics/active infection  - History of AOCD  - Continue to monitor CBC - Possibly reactive in setting of antibiotics/active infection  - History of AOCD  - heme/onc consulted 4/16 - workup in progress  - Continue to monitor CBC

## 2024-04-16 NOTE — PROGRESS NOTE ADULT - ASSESSMENT
75 yo F PMH T2DM on insulin, HTN, HLD, hypothyroidism, RA on Prednisone, Fibromyalgia, cerebral aneurysm s/p repair, acute hypercapnic respiratory failure s/p trach (vent dependent) and PEG (10/22), bedbound presented from home with complaints of possible G tube dislodgement and redness around the site, admitted for further work up, found to be bacteremic with serratia marcescens during admission, consulted for rule out endophthalmitis and bilateral scleral injection.    # Rule out endophthalmitis   - 74F with above medical hx was found to be bacteremic with serratia marcescens, denying blurry vision, only reporting burning of both eyes   - FARAZ VA 2/2 trach, IOP wnl OU, no rAPD OU, EOMs full  - Anterior exam with no hypopion, DFE with no signs of vitritis OU  - No concern for endophthalmitis at this time    # Injection OU   - Pt with hx of RA and sjogrens has bilateral scleral injection that is sectoral, no signs of conjunctivitis   - Possibly due to ocular surface dryness however cannot rule out RA related scleritis given appearance  - Will optimize oculasr surface and continue to monitor for now   - Start erythromycin ointment to both eyes TID (with last application of the day being at bedtime) - ordered  - Start preservative free artificial tears QID OU - ordered  - STOP home tobramycin gtt - no signs of active bacterial infection (discontinued)  - Ophtho to follow    Seen and discussed with Dr. Soria    Outpatient Follow-up: Patient should follow-up with his/her ophthalmologist or with [facility] Department of Ophthalmology within 1 week of after discharge at:    [address]  [place] [address]  [phone]   73 yo F PMH T2DM on insulin, HTN, HLD, hypothyroidism, RA on Prednisone, Fibromyalgia, cerebral aneurysm s/p repair, acute hypercapnic respiratory failure s/p trach (vent dependent) and PEG (10/22), bedbound presented from home with complaints of possible G tube dislodgement and redness around the site, admitted for further work up, found to be bacteremic with serratia marcescens during admission, consulted for rule out endophthalmitis and bilateral scleral injection.    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 Sorai    Outpatient Follow-up: Patient should follow-up with his/her ophthalmologist or with [facility] Department of Ophthalmology within 1 week of after discharge at:    [address]  [place] [address]  [phone]

## 2024-04-16 NOTE — PROGRESS NOTE ADULT - SUBJECTIVE AND OBJECTIVE BOX
Patient is a 74y old  Female who presents with a chief complaint of Dislodged G Tube (15 Apr 2024 17:52)      Interval Events:    REVIEW OF SYSTEMS:  [ ] Positive  [ ] All other systems negative  [ ] Unable to assess ROS because ________    Vital Signs Last 24 Hrs  T(C): 36.5 (04-16-24 @ 05:20), Max: 36.8 (04-15-24 @ 16:32)  T(F): 97.7 (04-16-24 @ 05:20), Max: 98.3 (04-15-24 @ 16:32)  HR: 72 (04-16-24 @ 05:20) (70 - 710)  BP: 109/63 (04-16-24 @ 05:20) (102/63 - 153/76)  RR: 18 (04-16-24 @ 05:20) (18 - 22)  SpO2: 100% (04-16-24 @ 05:20) (97% - 100%)    PHYSICAL EXAM:  HEENT:   [ ]Tracheostomy:  [ ]Pupils equal  [ ]No oral lesions  [ ]Abnormal    SKIN  [ ]No Rash  [ ] Abnormal  [ ] pressure    CARDIAC  [ ]Regular  [ ]Abnormal    PULMONARY  [ ]Bilateral Clear Breath Sounds  [ ]Normal Excursion  [ ]Abnormal    GI  [ ]PEG      [ ] +BS		              [ ]Soft, nondistended, nontender	  [ ]Abnormal    MUSCULOSKELETAL                                   [ ]Bedbound                 [ ]Abnormal    [ ]Ambulatory/OOB to chair                           EXTREMITIES                                         [ ]Normal  [ ]Edema                           NEUROLOGIC  [ ] Normal, non focal  [ ] Focal findings:    PSYCHIATRIC  [ ]Alert and appropriate  [ ] Sedated	 [ ]Agitated    :  Mcbride: [ ] Yes, if yes: Date of Placement:                   [  ] No    LINES: Central Lines [ ] Yes, if yes: Date of Placement                                     [  ] No    HOSPITAL MEDICATIONS:  MEDICATIONS  (STANDING):  albuterol/ipratropium for Nebulization 3 milliLiter(s) Nebulizer every 6 hours  amLODIPine   Tablet 10 milliGRAM(s) Oral <User Schedule>  artificial tears (preservative free) Ophthalmic Solution 1 Drop(s) Both EYES four times a day  ascorbic acid 500 milliGRAM(s) Oral daily  Biotene Dry Mouth Oral Rinse 5 milliLiter(s) Swish and Spit every 6 hours  chlorhexidine 0.12% Liquid 15 milliLiter(s) Oral Mucosa every 12 hours  chlorhexidine 4% Liquid 1 Application(s) Topical <User Schedule>  erythromycin   Ointment 1 Application(s) Both EYES three times a day  escitalopram 10 milliGRAM(s) Oral <User Schedule>  gabapentin Solution 100 milliGRAM(s) Oral <User Schedule>  insulin glargine Injectable (LANTUS) 5 Unit(s) SubCutaneous <User Schedule>  insulin lispro (ADMELOG) corrective regimen sliding scale   SubCutaneous every 6 hours  lactated ringers. 1000 milliLiter(s) (50 mL/Hr) IV Continuous <Continuous>  levothyroxine Injectable 87.5 MICROGram(s) IV Push <User Schedule>  lidocaine   4% Patch 1 Patch Transdermal at bedtime  lidocaine   4% Patch 1 Patch Transdermal at bedtime  melatonin Liquid 3 milliGRAM(s) Oral at bedtime  multivitamin/minerals/iron Oral Solution (CENTRUM) 15 milliLiter(s) Oral daily  pantoprazole  Injectable 40 milliGRAM(s) IV Push every 12 hours  predniSONE  Solution 5 milliGRAM(s) Oral <User Schedule>  QUEtiapine 12.5 milliGRAM(s) Oral <User Schedule>  simethicone 80 milliGRAM(s) Chew every 6 hours  surgical lubricant sterile 1 Application(s) Topical every 8 hours  vancomycin    Solution 125 milliGRAM(s) Oral every 12 hours    MEDICATIONS  (PRN):  calamine/zinc oxide Lotion 1 Application(s) Topical daily PRN Rash and/or Itching  HYDROmorphone  Injectable 1 milliGRAM(s) IV Push every 6 hours PRN Severe Pain (7 - 10)  HYDROmorphone  Injectable 0.5 milliGRAM(s) IV Push every 4 hours PRN Moderate Pain (4 - 6)  ondansetron Injectable 4 milliGRAM(s) IV Push every 8 hours PRN Nausea and/or Vomiting  sodium chloride 0.65% Nasal 1 Spray(s) Both Nostrils every 12 hours PRN Congestion      LABS:                        8.9    3.18  )-----------( 97       ( 16 Apr 2024 01:03 )             28.9     04-16    138  |  106  |  <4<L>  ----------------------------<  119<H>  4.5   |  20<L>  |  <0.30<L>    Ca    9.4      16 Apr 2024 01:03  Phos  2.8     04-16  Mg     1.4     04-16    TPro  5.8<L>  /  Alb  2.6<L>  /  TBili  2.8<H>  /  DBili  x   /  AST  28  /  ALT  28  /  AlkPhos  49  04-16    PT/INR - ( 16 Apr 2024 01:04 )   PT: 15.3 sec;   INR: 1.40 ratio         PTT - ( 16 Apr 2024 01:04 )  PTT:35.8 sec  Urinalysis Basic - ( 16 Apr 2024 01:03 )    Color: x / Appearance: x / SG: x / pH: x  Gluc: 119 mg/dL / Ketone: x  / Bili: x / Urobili: x   Blood: x / Protein: x / Nitrite: x   Leuk Esterase: x / RBC: x / WBC x   Sq Epi: x / Non Sq Epi: x / Bacteria: x          CAPILLARY BLOOD GLUCOSE    MICROBIOLOGY:     RADIOLOGY:  [ ] Reviewed and interpreted by me    Mode: AC/ CMV (Assist Control/ Continuous Mandatory Ventilation)  RR (machine): 12  TV (machine): 350  FiO2: 30  PEEP: 5  PS: 32  ITime: 1  MAP: 9  PIP: 31   Patient is a 74y old  Female who presents with a chief complaint of Dislodged G Tube (15 Apr 2024 17:52)      Interval Events:  No acute events overnight.    REVIEW OF SYSTEMS:  [ ] Positive  [X] All other systems negative  [ ] Unable to assess ROS because ________    Vital Signs Last 24 Hrs  T(C): 36.5 (04-16-24 @ 05:20), Max: 36.8 (04-15-24 @ 16:32)  T(F): 97.7 (04-16-24 @ 05:20), Max: 98.3 (04-15-24 @ 16:32)  HR: 72 (04-16-24 @ 05:20) (70 - 710)  BP: 109/63 (04-16-24 @ 05:20) (102/63 - 153/76)  RR: 18 (04-16-24 @ 05:20) (18 - 22)  SpO2: 100% (04-16-24 @ 05:20) (97% - 100%)    PHYSICAL EXAM:  HEENT:   [X]Tracheostomy: #8 distal XLT cuffed shiley  [X]Pupils equal - dilated at time of exam from opthalmology exam  [ ]No oral lesions  [X]Abnormal - scleral erythema, and yellowing from opthalmology exam    SKIN  [ ]No Rash  [ ] Abnormal  [X] pressure: sacral DTI    CARDIAC  [X]Regular  [ ]Abnormal    PULMONARY  [ ]Bilateral Clear Breath Sounds  [ ]Normal Excursion  [X]Abnormal - BL Coarse BS    GI  [X]PEG      [X] +BS		              [X]Soft, nondistended, nontender	  [X]Abnormal - rectal tube in place     MUSCULOSKELETAL                                   [X]Bedbound                 [ ]Abnormal    [ ]Ambulatory/OOB to chair                           EXTREMITIES                                         [X]Normal  [ ]Edema                           NEUROLOGIC  [ ] Normal, non focal  [X] Focal findings: awake, alert, following commands, mouthing words appropriately    PSYCHIATRIC  [X]Alert and appropriate  [ ] Sedated	 [ ]Agitated    :  Mcbride: [ ] Yes, if yes: Date of Placement:                   [X] No    LINES: Central Lines [ ] Yes, if yes: Date of Placement                                     [X] No    HOSPITAL MEDICATIONS:  MEDICATIONS  (STANDING):  albuterol/ipratropium for Nebulization 3 milliLiter(s) Nebulizer every 6 hours  amLODIPine   Tablet 10 milliGRAM(s) Oral <User Schedule>  artificial tears (preservative free) Ophthalmic Solution 1 Drop(s) Both EYES four times a day  ascorbic acid 500 milliGRAM(s) Oral daily  Biotene Dry Mouth Oral Rinse 5 milliLiter(s) Swish and Spit every 6 hours  chlorhexidine 0.12% Liquid 15 milliLiter(s) Oral Mucosa every 12 hours  chlorhexidine 4% Liquid 1 Application(s) Topical <User Schedule>  erythromycin   Ointment 1 Application(s) Both EYES three times a day  escitalopram 10 milliGRAM(s) Oral <User Schedule>  gabapentin Solution 100 milliGRAM(s) Oral <User Schedule>  insulin glargine Injectable (LANTUS) 5 Unit(s) SubCutaneous <User Schedule>  insulin lispro (ADMELOG) corrective regimen sliding scale   SubCutaneous every 6 hours  lactated ringers. 1000 milliLiter(s) (50 mL/Hr) IV Continuous <Continuous>  levothyroxine Injectable 87.5 MICROGram(s) IV Push <User Schedule>  lidocaine   4% Patch 1 Patch Transdermal at bedtime  lidocaine   4% Patch 1 Patch Transdermal at bedtime  melatonin Liquid 3 milliGRAM(s) Oral at bedtime  multivitamin/minerals/iron Oral Solution (CENTRUM) 15 milliLiter(s) Oral daily  pantoprazole  Injectable 40 milliGRAM(s) IV Push every 12 hours  predniSONE  Solution 5 milliGRAM(s) Oral <User Schedule>  QUEtiapine 12.5 milliGRAM(s) Oral <User Schedule>  simethicone 80 milliGRAM(s) Chew every 6 hours  surgical lubricant sterile 1 Application(s) Topical every 8 hours  vancomycin    Solution 125 milliGRAM(s) Oral every 12 hours    MEDICATIONS  (PRN):  calamine/zinc oxide Lotion 1 Application(s) Topical daily PRN Rash and/or Itching  HYDROmorphone  Injectable 1 milliGRAM(s) IV Push every 6 hours PRN Severe Pain (7 - 10)  HYDROmorphone  Injectable 0.5 milliGRAM(s) IV Push every 4 hours PRN Moderate Pain (4 - 6)  ondansetron Injectable 4 milliGRAM(s) IV Push every 8 hours PRN Nausea and/or Vomiting  sodium chloride 0.65% Nasal 1 Spray(s) Both Nostrils every 12 hours PRN Congestion    LABS:                        8.9    3.18  )-----------( 97       ( 16 Apr 2024 01:03 )             28.9     04-16    138  |  106  |  <4<L>  ----------------------------<  119<H>  4.5   |  20<L>  |  <0.30<L>    Ca    9.4      16 Apr 2024 01:03  Phos  2.8     04-16  Mg     1.4     04-16    TPro  5.8<L>  /  Alb  2.6<L>  /  TBili  2.8<H>  /  DBili  x   /  AST  28  /  ALT  28  /  AlkPhos  49  04-16    PT/INR - ( 16 Apr 2024 01:04 )   PT: 15.3 sec;   INR: 1.40 ratio      PTT - ( 16 Apr 2024 01:04 )  PTT:35.8 sec  Urinalysis Basic - ( 16 Apr 2024 01:03 )    Color: x / Appearance: x / SG: x / pH: x  Gluc: 119 mg/dL / Ketone: x  / Bili: x / Urobili: x   Blood: x / Protein: x / Nitrite: x   Leuk Esterase: x / RBC: x / WBC x   Sq Epi: x / Non Sq Epi: x / Bacteria: x    CAPILLARY BLOOD GLUCOSE    MICROBIOLOGY:     RADIOLOGY:  [ ] Reviewed and interpreted by me    Mode: AC/ CMV (Assist Control/ Continuous Mandatory Ventilation)  RR (machine): 12  TV (machine): 350  FiO2: 30  PEEP: 5  PS: 32  ITime: 1  MAP: 9  PIP: 31

## 2024-04-16 NOTE — CONSULT NOTE ADULT - ASSESSMENT
75 yo F PMH T2DM on insulin, HTN, HLD, hypothyroidism, RA on Prednisone, Fibromyalgia, cerebral aneurysm s/p repair, chronic hypercapnic respiratory failure s/p trach (vent dependent) and Chronic PEG 2022, recurrent C.diff infection (follows with Dr. Newman, last treated 2-3 weeks ago with Fidaxomicin), bedbound who presented from home with complaints of possible G tube dislodgement and redness around the site. Hospital course b/c MICU stay requiring ventilation and cellulitis given vancomycin/meropenem. Patient was planned for PEG revision with both Surgery and GI team involved on 4/11 however patient had fevers up to 102F and procedure was cancelled for infectious work-up.  Antibiotics then switched from Meropenem to Cefepime.  An 18 Fr PEG tube placed at bedside on 4/12 (original PEG size was 20Fr) as stoma site appears to have closed. Hematology consulted for new thrombocytopenia (PLT normal in Feb 2024).     Labs today: WBC 3.2, Hgb 8.9, PLT 97, MCV 87.3, T bilirubin 2.8    PLAN:  # pancytopenia  - Pancytopenia likely 2/2 sepsis and acute illness suppressing the bone marrow  - Will review peripheral smear  - Infectious workup and management per primary team and ID  - Trend CBC w/ diff daily and retics and coags  - Please order fibrinogen, LDH, retic count, haptoglobin    NOTE INCOMPLETE UNTIL ATTENDING SIGNS    ***************************************************************  Marivel Portillo, PGY4  Fellow Hematology/Oncology  pager: [phone]   Available on [employer] Teams  After 5pm or on weekends please contact  to page on-call fellow   ***************************************************************     75 yo F PMH T2DM on insulin, HTN, HLD, hypothyroidism, RA on Prednisone, Fibromyalgia, cerebral aneurysm s/p repair, chronic hypercapnic respiratory failure s/p trach (vent dependent) and Chronic PEG 2022, recurrent C.diff infection (follows with Dr. Newman, last treated 2-3 weeks ago with Fidaxomicin), bedbound who presented from home with complaints of possible G tube dislodgement and redness around the site. Hospital course b/c MICU stay requiring ventilation and cellulitis given vancomycin/meropenem. Patient was planned for PEG revision with both Surgery and GI team involved on 4/11 however patient had fevers up to 102F and procedure was cancelled for infectious work-up.  Antibiotics then switched from Meropenem to Cefepime.  An 18 Fr PEG tube placed at bedside on 4/12 (original PEG size was 20Fr) as stoma site appears to have closed. Hematology consulted for new thrombocytopenia (PLT normal in Feb 2024).     Labs today: WBC 3.2, Hgb 8.9, PLT 97, MCV 87.3, T bilirubin 2.8  Labs in Feb 2024: WBC 8.4, Hgb 9.7, MCV 85.3,     PLAN:  # pancytopenia  - Pancytopenia likely 2/2 sepsis and acute illness suppressing the bone marrow. Normal PLTs and WBCs 2/19/24  - Please order anemia workup (iron, TIBC, ferritin, retic)  - Will review peripheral smear  - Infectious workup and management per primary team and ID  - Trend CBC w/ diff daily and retics and coags  - Please order fibrinogen, LDH, retic count, haptoglobin    NOTE INCOMPLETE UNTIL ATTENDING SIGNS    ***************************************************************  Marivel Portillo, PGY4  Fellow Hematology/Oncology  pager: [phone]   Available on [employer] Teams  After 5pm or on weekends please contact  to page on-call fellow   ***************************************************************     73 yo F PMH T2DM on insulin, HTN, HLD, hypothyroidism, RA on Prednisone, Fibromyalgia, cerebral aneurysm s/p repair, chronic hypercapnic respiratory failure s/p trach (vent dependent) and Chronic PEG 2022, recurrent C.diff infection (follows with Dr. Newman, last treated 2-3 weeks ago with Fidaxomicin), bedbound who presented from home with complaints of possible G tube dislodgement and redness around the site. Hospital course b/c MICU stay requiring ventilation and cellulitis given vancomycin/meropenem. Patient was planned for PEG revision with both Surgery and GI team involved on 4/11 however patient had fevers up to 102F and procedure was cancelled for infectious work-up.  Antibiotics then switched from Meropenem to Cefepime.  An 18 Fr PEG tube placed at bedside on 4/12 (original PEG size was 20Fr) as stoma site appears to have closed. Hematology consulted for new thrombocytopenia (PLT normal in Feb 2024).     Labs today: WBC 3.2, Hgb 8.9, PLT 97, MCV 87.3, T bilirubin 2.8  Labs in Feb 2024: WBC 8.4, Hgb 9.7, MCV 85.3,     PLAN:  # pancytopenia  - Pancytopenia likely 2/2 sepsis and acute illness suppressing the bone marrow. Normal PLTs and WBCs 2/19/24  - Please order anemia workup (iron, TIBC, ferritin, retic)  - Will review peripheral smear  - Infectious workup and management per primary team and ID  - Trend CBC w/ diff daily and retics and coags  - Please order fibrinogen, LDH, retic count, haptoglobin  - Maintain active T&S  - Transfuse for hg < 7.0 and platelets < 10k, < 20k if febrile and < 50k if bleeding    NOTE INCOMPLETE UNTIL ATTENDING SIGNS    ***************************************************************  Marivel Portillo, PGY4  Fellow Hematology/Oncology  pager: [phone]   Available on Microsoft Teams  After 5pm or on weekends please contact  to page on-call fellow   ***************************************************************     73 yo F PMH T2DM on insulin, HTN, HLD, hypothyroidism, RA on Prednisone, Fibromyalgia, cerebral aneurysm s/p repair, chronic hypercapnic respiratory failure s/p trach (vent dependent) and Chronic PEG 2022, recurrent C.diff infection (follows with Dr. Newman, last treated 2-3 weeks ago with Fidaxomicin), bedbound who presented from home with complaints of possible G tube dislodgement and redness around the site. Hospital course b/c MICU stay requiring ventilation and cellulitis given vancomycin/meropenem. Patient was planned for PEG revision with both Surgery and GI team involved on 4/11 however patient had fevers up to 102F and procedure was cancelled for infectious work-up.  Antibiotics then switched from Meropenem to Cefepime.  An 18 Fr PEG tube placed at bedside on 4/12 (original PEG size was 20Fr) as stoma site appears to have closed. Hematology consulted for new thrombocytopenia (PLT normal in Feb 2024).     Labs today: WBC 3.2, Hgb 8.9, PLT 97, MCV 87.3, T bilirubin 2.8, INR 1.4  Labs in Feb 2024: WBC 8.4, Hgb 9.7, MCV 85.3,     PLAN:  # pancytopenia  - Pancytopenia likely 2/2 sepsis and acute illness suppressing the bone marrow. Normal PLTs and WBCs 2/19/24  - Please order anemia workup (iron, TIBC, ferritin, retic), B12, folate  - Will review peripheral smear  - Infectious workup and management per primary team and ID  - Trend CBC w/ diff daily and retics and coags  - Please order fibrinogen, LDH, retic count, haptoglobin. Transfuse cryoprecipitate if fibrinogen < 100  - Maintain active T&S  - Transfuse for hg < 7.0 and platelets < 10k, < 20k if febrile and < 50k if bleeding    NOTE INCOMPLETE UNTIL ATTENDING SIGNS    ***************************************************************  Marivel Portillo, PGY4  Fellow Hematology/Oncology  pager: [phone]   Available on Microsoft Teams  After 5pm or on weekends please contact  to page on-call fellow   ***************************************************************     73 yo F PMH T2DM on insulin, HTN, HLD, hypothyroidism, RA on Prednisone, Fibromyalgia, cerebral aneurysm s/p repair, chronic hypercapnic respiratory failure s/p trach (vent dependent) and Chronic PEG 2022, recurrent C.diff infection (follows with Dr. Newman, last treated 2-3 weeks ago with Fidaxomicin), bedbound who presented from home with complaints of possible G tube dislodgement and redness around the site. Hospital course b/c MICU stay requiring ventilation and cellulitis given vancomycin/meropenem. Patient was planned for PEG revision with both Surgery and GI team involved on 4/11 however patient had fevers up to 102F and procedure was cancelled for infectious work-up.  Antibiotics then switched from Meropenem to Cefepime.  An 18 Fr PEG tube placed at bedside on 4/12 (original PEG size was 20Fr) as stoma site appears to have closed. Hematology consulted for new thrombocytopenia (PLT normal in Feb 2024).     Labs today: WBC 3.2, Hgb 8.9, PLT 97, MCV 87.3, T bilirubin 2.8, INR 1.4  Labs in Feb 2024: WBC 8.4, Hgb 9.7, MCV 85.3,     PLAN:  # Pancytopenia  - Pancytopenia likely 2/2 sepsis and acute illness suppressing the bone marrow vs drug-induced from multiple antibiotics. Platelets and WBCs were normal in 2/19/24.  - Please order anemia workup (iron, TIBC, ferritin, retic), B12, folate  - Peripheral smear (4/16/24): two populations of red cells (due to transfusions), decreased platelets, target cells  - Infectious workup and management per primary team and ID  - Hematology team had extensive discussion with patient's daughter at bedside. Answered all questions to satisfaction.  - Trend CBC w/ diff daily and retics and coags  - Please order fibrinogen, LDH, retic count, haptoglobin. Transfuse cryoprecipitate if fibrinogen < 100  - Please give IV Vit K 10 mg x 3 days given low INR. Would recommend dietician consult to discuss with family Vit K replacement at home through PEG tube.  - Maintain active T&S  - Transfuse for hg < 7.0 and platelets < 10k, < 20k if febrile and < 50k if bleeding. However, primary team can consider transfusion goal hg < 8.0 if patient has symptoms of anemia.   - Hematology will sign off. Please feel free to re-consult with any additional questions.    NOTE INCOMPLETE UNTIL ATTENDING SIGNS    ***************************************************************  Marivel Portillo, PGY4  Fellow Hematology/Oncology  pager: [phone]   Available on [employer] Teams  After 5pm or on weekends please contact  to page on-call fellow   ***************************************************************     75 yo F PMH T2DM on insulin, HTN, HLD, hypothyroidism, RA on Prednisone, Fibromyalgia, cerebral aneurysm s/p repair, chronic hypercapnic respiratory failure s/p trach (vent dependent) and Chronic PEG 2022, recurrent C.diff infection (follows with Dr. Newman, last treated 2-3 weeks ago with Fidaxomicin), bedbound who presented from home with complaints of possible G tube dislodgement and redness around the site. Hospital course b/c MICU stay requiring ventilation and cellulitis given vancomycin/meropenem. Patient was planned for PEG revision with both Surgery and GI team involved on 4/11 however patient had fevers up to 102F and procedure was cancelled for infectious work-up.  Antibiotics then switched from Meropenem to Cefepime.  An 18 Fr PEG tube placed at bedside on 4/12 (original PEG size was 20Fr) as stoma site appears to have closed. Hematology consulted for new thrombocytopenia (PLT normal in Feb 2024).     Labs today: WBC 3.2, Hgb 8.9, PLT 97, MCV 87.3, normal diff, T bilirubin 2.8, INR 1.4  Labs in Feb 2024: WBC 8.4, Hgb 9.7, MCV 85.3,     PLAN:  # Pancytopenia  - Pancytopenia likely 2/2 sepsis and acute illness suppressing the bone marrow vs drug-induced from multiple antibiotics. Platelets and WBCs were normal in 2/19/24.  - Please order anemia workup (iron, TIBC, ferritin, retic), B12, folate  - Peripheral smear (4/16/24): two populations of red cells (due to transfusions), decreased platelets, target cells  - Infectious workup and management per primary team and ID  - Hematology team had extensive discussion with patient's daughter at bedside. Answered all questions to satisfaction.  - Trend CBC w/ diff daily and retics and coags  - Please order fibrinogen, LDH, retic count, haptoglobin. Transfuse cryoprecipitate if fibrinogen < 100  - Please give IV Vit K 10 mg x 3 days given low INR. Would recommend dietician consult to discuss with family Vit K replacement at home through PEG tube.  - Maintain active T&S  - Transfuse for hg < 7.0 and platelets < 10k, < 20k if febrile and < 50k if bleeding. However, primary team can consider transfusion goal hg < 8.0 if patient has symptoms of anemia.   - Hematology will sign off. Please feel free to re-consult with any additional questions.    NOTE INCOMPLETE UNTIL ATTENDING SIGNS    ***************************************************************  Marivel Portillo, PGY4  Fellow Hematology/Oncology  pager: [phone]   Available on [employer] Teams  After 5pm or on weekends please contact  to page on-call fellow   ***************************************************************     73 yo F PMH T2DM on insulin, HTN, HLD, hypothyroidism, RA on Prednisone, Fibromyalgia, cerebral aneurysm s/p repair, chronic hypercapnic respiratory failure s/p trach (vent dependent) and Chronic PEG 2022, recurrent C.diff infection (follows with Dr. Newman, last treated 2-3 weeks ago with Fidaxomicin), bedbound who presented from home with complaints of possible G tube dislodgement and redness around the site. Hospital course b/c MICU stay requiring ventilation and cellulitis given vancomycin/meropenem. Patient was planned for PEG revision with both Surgery and GI team involved on 4/11 however patient had fevers up to 102F and procedure was cancelled for infectious work-up.  Antibiotics then switched from Meropenem to Cefepime.  An 18 Fr PEG tube placed at bedside on 4/12 (original PEG size was 20Fr) as stoma site appears to have closed. Hematology consulted for new thrombocytopenia (PLT normal in Feb 2024).     Labs today: WBC 3.2, Hgb 8.9, PLT 97, MCV 87.3, normal diff, T bilirubin 2.8, INR 1.4  Labs in Feb 2024: WBC 8.4, Hgb 9.7, MCV 85.3,     PLAN:  # Pancytopenia  - Pancytopenia likely 2/2 sepsis and acute illness suppressing the bone marrow vs drug-induced from multiple antibiotics. Platelets and WBCs were normal in 2/19/24.  - Please order anemia workup (iron, TIBC, ferritin, retic), B12, folate  - Peripheral smear (4/16/24): two populations of red cells (due to transfusions), decreased platelets, target cells  - Infectious workup and management per primary team and ID  - Hematology team had extensive discussion with patient's daughter at bedside. Answered all questions to satisfaction. Discussed risks and benefits of having a higher transfusion goal of Hgb < 8. Risks mentioned include increased possibility of developing antibodies to blood products, volume overload. Additionally, we discussed that often in sepsis hemoglobin drops and the correlation with patient's illness with a drop in hemoglobin is likely not caused by the drop in hemoglobin but more due to underlying illness. Daughter expressed understanding.  - Trend CBC w/ diff daily and retics and coags  - Please order fibrinogen, LDH, retic count, haptoglobin. Transfuse cryoprecipitate if fibrinogen < 100  - Please give IV Vit K 10 mg x 3 days given low INR. Would recommend dietician consult to discuss with family Vit K replacement at home through PEG tube.  - Maintain active T&S  - Transfuse for hg < 7.0 and platelets < 10k, < 20k if febrile and < 50k if bleeding. However, primary team can consider transfusion goal hg < 8.0 if patient has symptoms of anemia.   - Hematology will sign off. Please feel free to re-consult with any additional questions.    NOTE INCOMPLETE UNTIL ATTENDING SIGNS    ***************************************************************  Marivel Portillo, PGY4  Fellow Hematology/Oncology  pager: [phone]   Available on [employer] Teams  After 5pm or on weekends please contact  to page on-call fellow   ***************************************************************

## 2024-04-16 NOTE — PROGRESS NOTE ADULT - SUBJECTIVE AND OBJECTIVE BOX
[facility] DEPARTMENT OF OPHTHALMOLOGY  ------------------------------------------------------------------------------  Thomas Beckford MD, PGY-2  Contact: TEAMS  ------------------------------------------------------------------------------    Interval History: No acute events overnight.    MEDICATIONS  (STANDING):  albuterol/ipratropium for Nebulization 3 milliLiter(s) Nebulizer every 6 hours  amLODIPine   Tablet 10 milliGRAM(s) Oral <User Schedule>  artificial tears (preservative free) Ophthalmic Solution 1 Drop(s) Both EYES four times a day  ascorbic acid 500 milliGRAM(s) Oral daily  Biotene Dry Mouth Oral Rinse 5 milliLiter(s) Swish and Spit every 6 hours  chlorhexidine 0.12% Liquid 15 milliLiter(s) Oral Mucosa every 12 hours  chlorhexidine 4% Liquid 1 Application(s) Topical <User Schedule>  erythromycin   Ointment 1 Application(s) Both EYES three times a day  escitalopram 10 milliGRAM(s) Oral <User Schedule>  gabapentin Solution 100 milliGRAM(s) Oral <User Schedule>  insulin glargine Injectable (LANTUS) 5 Unit(s) SubCutaneous <User Schedule>  insulin lispro (ADMELOG) corrective regimen sliding scale   SubCutaneous every 6 hours  lactated ringers. 1000 milliLiter(s) (50 mL/Hr) IV Continuous <Continuous>  levothyroxine Injectable 87.5 MICROGram(s) IV Push <User Schedule>  lidocaine   4% Patch 1 Patch Transdermal at bedtime  lidocaine   4% Patch 1 Patch Transdermal at bedtime  melatonin Liquid 3 milliGRAM(s) Oral at bedtime  multivitamin/minerals/iron Oral Solution (CENTRUM) 15 milliLiter(s) Oral daily  pantoprazole  Injectable 40 milliGRAM(s) IV Push every 12 hours  predniSONE  Solution 5 milliGRAM(s) Oral <User Schedule>  QUEtiapine 12.5 milliGRAM(s) Oral <User Schedule>  simethicone 80 milliGRAM(s) Chew every 6 hours  surgical lubricant sterile 1 Application(s) Topical every 8 hours  vancomycin    Solution 125 milliGRAM(s) Oral every 12 hours    MEDICATIONS  (PRN):  calamine/zinc oxide Lotion 1 Application(s) Topical daily PRN Rash and/or Itching  HYDROmorphone  Injectable 1 milliGRAM(s) IV Push every 6 hours PRN Severe Pain (7 - 10)  HYDROmorphone  Injectable 0.5 milliGRAM(s) IV Push every 4 hours PRN Moderate Pain (4 - 6)  ondansetron Injectable 4 milliGRAM(s) IV Push every 8 hours PRN Nausea and/or Vomiting  sodium chloride 0.65% Nasal 1 Spray(s) Both Nostrils every 12 hours PRN Congestion      VITALS: T(C): 36.5 (04-16-24 @ 05:20)  T(F): 97.7 (04-16-24 @ 05:20), Max: 98.3 (04-15-24 @ 16:32)  HR: 72 (04-16-24 @ 05:20) (70 - 710)  BP: 109/63 (04-16-24 @ 05:20) (102/63 - 153/76)  RR:  (18 - 22)  SpO2:  (97% - 100%)  Wt(kg): --  General: AAO x 3, appropriate mood and affect    Ophthalmology Exam:  Visual acuity (sc): FARAZ 2/2 trach and participation  Pupils: PERRL OU, no APD  Ttono: 18 OU   Extraocular movements (EOMs): Full OU, no pain, no diplopia  Confrontational Visual Field (CVF): FARAZ 2/2 trach and participation  Color Plates: FARAZ 2/2 trach and participation    Pen Light Exam (PLE)  External: Flat OU  Lids/Lashes/Lacrimal Ducts: Stye RUL OD ; Flat OS  Sclera/Conjunctiva: OD: sectoral injection temporal and inferior with temporal pigmented area ; OS: sectoral injection temporal and inferior  Cornea: OD: 3 + SPK ; OS: 4+ SPK   Anterior Chamber: D+F OU    Iris: Flat OU  Lens: PCIOL OD; OS: dense cataract OS     Fundus Exam: dilated with 1% tropicamide and 2.5% phenylephrine  Approval obtained from primary team for dilation  Patient aware that pupils can remained dilated for at least 4-6 hours  Exam performed with 20D lens    Vitreous: wnl OU  Disc, cup/disc: sharp and pink, 0.4 OU  Macula: OD: few DBH OD ; OS: few DBH   Vessels: wnl OU

## 2024-04-16 NOTE — PROGRESS NOTE ADULT - PROBLEM SELECTOR PLAN 7
- Sacral wound present on admission   - Wound Care team continues to follow  - Frequent turning and repositioning

## 2024-04-16 NOTE — PROGRESS NOTE ADULT - PROBLEM SELECTOR PLAN 1
- Patient p/w PEG tube dislodgement on admission   - CT A/P 4/2: Dislodged G tube with balloon in the anterior abdominal wall with air filled track to stomach  - S/p bedside exchange by IR on 4/3 ( # 20 Fr Kangaroo EnFit placed )  - 4/4: PEG leaking; IR adjusted PEG at bedside  - 4/5: PEG still leaking, adjusted at bedside by IR Attending.  - 4/8-4/9 IR aborted procedure; GI Dr. Cleaning consulted for new PEG  - 4/9 PEG fell out spontaneously, Mcbride placed into tract   - 4/12: Mcbride replaced with 18Fr PEG at bedside, bumper at 8cm (intentionally loose)  - CT A/P 4/12: G-tube in the stomach / Mild inflammation thickening along tract   - NPO @ Midnight for Non-invasive Closure of Stoma and PEG replacement by GI and Surgery - Patient p/w PEG tube dislodgement on admission   - CT A/P 4/2: Dislodged G tube with balloon in the anterior abdominal wall with air filled track to stomach  - S/p bedside exchange by IR on 4/3 ( # 20 Fr Kangaroo EnFit placed )  - 4/4: PEG leaking; IR adjusted PEG at bedside  - 4/5: PEG still leaking, adjusted at bedside by IR Attending.  - 4/8-4/9 IR aborted procedure; GI Dr. Cleaning consulted for new PEG  - 4/9 PEG fell out spontaneously, Mcbride placed into tract   - 4/12: Mcbride replaced with 18Fr PEG at bedside, bumper at 8cm (intentionally loose)  - CT A/P 4/12: G-tube in the stomach/Mild inflammation thickening along tract   - 4/14 18 fr PEG was used, feeds were re-initiated, attempt failed, PEG with copious amounts of leakage  - 4/16 s/p new PEG-J and closure of gastric fistula in endo suite with GI and surgery

## 2024-04-16 NOTE — PROGRESS NOTE ADULT - PROBLEM SELECTOR PLAN 4
- IV Vanco/meropenem x1 on admission for concern for cellulitis  - 4/2 blood cultures negative x2, MRSA/MSSA PCR +  - 4/3 urine culture nl vinay, CT A/P 4/2: no infectious focus or colitis  - 4/5 Procal 6.6 increased from 0.08 on 4/3, lactate 4.3  - 4/7 Fever, meropenem restarted - no leukocytosis - RVP neg, UA neg, dopplers neg  - 4/7 blood cultures positive for serratia. Txd w/ Meropenem 4/7 -->4/11, transitioned to Cefepime 4/11 --> 4/13  - ID recs appreciated; Continues to follow - IV Vanco/meropenem x1 on admission for concern for cellulitis  - 4/2 blood cultures negative x2, MRSA/MSSA PCR +  - 4/3 urine culture nl vinay, CT A/P 4/2: no infectious focus or colitis  - 4/5 Procal 6.6 increased from 0.08 on 4/3, lactate 4.3  - 4/7 Fever, meropenem restarted - no leukocytosis - RVP neg, UA neg, dopplers neg  - 4/7 blood cultures positive for serratia - Meropenem 4/2-4/3 and 4/7-4/11, switched to cefepime 4/11-4/14  - ID recs appreciated; Continues to follow

## 2024-04-16 NOTE — CONSULT NOTE ADULT - SUBJECTIVE AND OBJECTIVE BOX
HPI:  75 yo F PMH T2DM on insulin, HTN, HLD, hypothyroidism, RA on Prednisone, Fibromyalgia, cerebral aneurysm s/p repair, acute hypercapnic respiratory failure s/p trach (vent dependent) and PEG (10/22), bedbound presented from home with complaints of possible G tube dislodgement and redness around the site.  Site is red with small amount of pus at insertion site.  Tender to palpation, warm to touch. CT Abdomen/Pelvis done showing dislodgement of G tube with balloon in the anterior abdominal wall with air filled track to stomach.  GI contacted, but unable to replace at bedside given placement of balloon. Surgery consulted--recommend gastrograffin study to eval placement of PEG and possible IR replacement in AM. Given Vancomycin 1 gm IV x 1 due to concern for possible cellulitis.  Of note, patient hemodynamically stable, afebrile, without leukocytosis on presentation.     Per patient's son at bedside, patient currently being treated for C-diff.  Known infection for past 2-3 weeks.  Treated by Dr. Zion Newman, currently receiving Fidaxomicin.  Also report recent UTI--not currently being treated.    Admitted to MICU for ventilator support and treatment of dislodged PEG/abdominal wall cellulitis.  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      H/O tracheostomy      PEG (percutaneous endoscopic gastrostomy) status          Allergies    pineapple (Unknown)  Tagamet (Unknown)  heparin (Unknown)  walnut (Unknown)  metronidazole (Rash)  penicillin (Unknown)  Lyrica (Unknown)  Pecan, Filbert, Hazelnut (Unknown)    Intolerances        MEDICATIONS  (STANDING):  albuterol/ipratropium for Nebulization 3 milliLiter(s) Nebulizer every 6 hours  amLODIPine   Tablet 10 milliGRAM(s) Oral <User Schedule>  artificial tears (preservative free) Ophthalmic Solution 1 Drop(s) Both EYES four times a day  ascorbic acid 500 milliGRAM(s) Oral daily  Biotene Dry Mouth Oral Rinse 5 milliLiter(s) Swish and Spit every 6 hours  chlorhexidine 0.12% Liquid 15 milliLiter(s) Oral Mucosa every 12 hours  chlorhexidine 4% Liquid 1 Application(s) Topical <User Schedule>  erythromycin   Ointment 1 Application(s) Both EYES three times a day  escitalopram 10 milliGRAM(s) Oral <User Schedule>  gabapentin Solution 100 milliGRAM(s) Oral <User Schedule>  insulin glargine Injectable (LANTUS) 5 Unit(s) SubCutaneous <User Schedule>  insulin lispro (ADMELOG) corrective regimen sliding scale   SubCutaneous every 6 hours  lactated ringers. 1000 milliLiter(s) (50 mL/Hr) IV Continuous <Continuous>  levothyroxine Injectable 87.5 MICROGram(s) IV Push <User Schedule>  lidocaine   4% Patch 1 Patch Transdermal at bedtime  lidocaine   4% Patch 1 Patch Transdermal at bedtime  melatonin Liquid 3 milliGRAM(s) Oral at bedtime  multivitamin/minerals/iron Oral Solution (CENTRUM) 15 milliLiter(s) Oral daily  pantoprazole  Injectable 40 milliGRAM(s) IV Push every 12 hours  predniSONE  Solution 5 milliGRAM(s) Oral <User Schedule>  QUEtiapine 12.5 milliGRAM(s) Oral <User Schedule>  simethicone 80 milliGRAM(s) Chew every 6 hours  surgical lubricant sterile 1 Application(s) Topical every 8 hours  vancomycin    Solution 125 milliGRAM(s) Oral every 12 hours    MEDICATIONS  (PRN):  calamine/zinc oxide Lotion 1 Application(s) Topical daily PRN Rash and/or Itching  HYDROmorphone  Injectable 1 milliGRAM(s) IV Push every 6 hours PRN Severe Pain (7 - 10)  HYDROmorphone  Injectable 0.5 milliGRAM(s) IV Push every 4 hours PRN Moderate Pain (4 - 6)  ondansetron Injectable 4 milliGRAM(s) IV Push every 8 hours PRN Nausea and/or Vomiting  sodium chloride 0.65% Nasal 1 Spray(s) Both Nostrils every 12 hours PRN Congestion      FAMILY HISTORY:      SOCIAL HISTORY: No EtOH, no tobacco        VITALS:   T(F): 97.7 (04-16-24 @ 05:20), Max: 98.3 (04-15-24 @ 16:32)  HR: 92 (04-16-24 @ 09:17)  BP: 109/63 (04-16-24 @ 05:20)  RR: 15 (04-16-24 @ 09:16)  SpO2: 98% (04-16-24 @ 09:17)  Wt(kg): --    PHYSICAL EXAM    GENERAL: NAD, well-developed  HEAD:  Atraumatic, Normocephalic  EYES: EOMI, PERRLA, conjunctiva and sclera clear  NECK: Supple, No JVD  CHEST/LUNG: Clear to auscultation bilaterally; No wheeze  HEART: Regular rate and rhythm; No murmurs, rubs, or gallops  ABDOMEN: Soft, Nontender, Nondistended; Bowel sounds present  EXTREMITIES:  2+ Peripheral Pulses, No clubbing, cyanosis, or edema  NEUROLOGY: non-focal  SKIN: No rashes or lesions    LABS:                         8.9    3.18  )-----------( 97       ( 16 Apr 2024 01:03 )             28.9     04-16    138  |  106  |  <4<L>  ----------------------------<  119<H>  4.5   |  20<L>  |  <0.30<L>    Ca    9.4      16 Apr 2024 01:03  Phos  2.8     04-16  Mg     1.4     04-16    TPro  5.8<L>  /  Alb  2.6<L>  /  TBili  2.8<H>  /  DBili  x   /  AST  28  /  ALT  28  /  AlkPhos  49  04-16    Magnesium: 1.4 mg/dL (04-16 @ 01:03)  Phosphorus: 2.8 mg/dL (04-16 @ 01:03)  Magnesium: 1.7 mg/dL (04-15 @ 10:35)  Phosphorus: 2.9 mg/dL (04-15 @ 10:35)    PT/INR - ( 16 Apr 2024 01:04 )   PT: 15.3 sec;   INR: 1.40 ratio         PTT - ( 16 Apr 2024 01:04 )  PTT:35.8 sec  Catheterized Catheterized  04-12 @ 13:30   No growth  --  --      .Blood Blood-Peripheral  04-12 @ 12:16   No growth at 72 Hours  --  --      .Blood Blood-Peripheral  04-11 @ 07:19   No growth at 4 days  --  --      .Blood Blood-Peripheral  04-09 @ 18:55   No growth at 5 days  --  --      .Sputum Sputum  04-07 @ 14:31   Mixed gram negative rods Culture includes  Moderate Stenotrophomonas maltophilia  Few Pseudomonas aeruginosa (Carbapenem Resistant)  --  Stenotrophomonas maltophilia  Pseudomonas aeruginosa (Carbapenem Resistant)      .Blood Blood-Peripheral  04-07 @ 07:21   No growth at 5 days  --  Blood Culture PCR  Serratia marcescens      .Sputum Sputum  04-05 @ 06:43   Numerous Pseudomonas aeruginosa  Normal Respiratory Shanda present  --  Pseudomonas aeruginosa      .Blood Blood-Peripheral  04-04 @ 21:00   No growth at 5 days  --  --      Clean Catch Clean Catch (Midstream)  04-03 @ 02:07   <10,000 CFU/mL Normal Urogenital Shanda  --  --      .Blood Blood-Peripheral  04-02 @ 23:39   No growth at 5 days  --  --          IMAGING: HPI:  73 yo F PMH T2DM on insulin, HTN, HLD, hypothyroidism, RA on Prednisone, Fibromyalgia, cerebral aneurysm s/p repair, acute hypercapnic respiratory failure s/p trach (vent dependent) and PEG (10/22), bedbound presented from home with complaints of possible G tube dislodgement and redness around the site.  Site is red with small amount of pus at insertion site.  Tender to palpation, warm to touch. CT Abdomen/Pelvis done showing dislodgement of G tube with balloon in the anterior abdominal wall with air filled track to stomach.  GI contacted, but unable to replace at bedside given placement of balloon. Surgery consulted--recommend gastrograffin study to eval placement of PEG and possible IR replacement in AM. Given Vancomycin 1 gm IV x 1 due to concern for possible cellulitis.  Of note, patient hemodynamically stable, afebrile, without leukocytosis on presentation.     Per patient's son at bedside, patient currently being treated for C-diff.  Known infection for past 2-3 weeks.  Treated by Dr. Zion Newman, currently receiving Fidaxomicin.  Also report recent UTI--not currently being treated.    Admitted to MICU for ventilator support and treatment of dislodged PEG/abdominal wall cellulitis.  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      H/O tracheostomy      PEG (percutaneous endoscopic gastrostomy) status          Allergies    pineapple (Unknown)  Tagamet (Unknown)  heparin (Unknown)  walnut (Unknown)  metronidazole (Rash)  penicillin (Unknown)  Lyrica (Unknown)  Pecan, Filbert, Hazelnut (Unknown)    Intolerances        MEDICATIONS  (STANDING):  albuterol/ipratropium for Nebulization 3 milliLiter(s) Nebulizer every 6 hours  amLODIPine   Tablet 10 milliGRAM(s) Oral <User Schedule>  artificial tears (preservative free) Ophthalmic Solution 1 Drop(s) Both EYES four times a day  ascorbic acid 500 milliGRAM(s) Oral daily  Biotene Dry Mouth Oral Rinse 5 milliLiter(s) Swish and Spit every 6 hours  chlorhexidine 0.12% Liquid 15 milliLiter(s) Oral Mucosa every 12 hours  chlorhexidine 4% Liquid 1 Application(s) Topical <User Schedule>  erythromycin   Ointment 1 Application(s) Both EYES three times a day  escitalopram 10 milliGRAM(s) Oral <User Schedule>  gabapentin Solution 100 milliGRAM(s) Oral <User Schedule>  insulin glargine Injectable (LANTUS) 5 Unit(s) SubCutaneous <User Schedule>  insulin lispro (ADMELOG) corrective regimen sliding scale   SubCutaneous every 6 hours  lactated ringers. 1000 milliLiter(s) (50 mL/Hr) IV Continuous <Continuous>  levothyroxine Injectable 87.5 MICROGram(s) IV Push <User Schedule>  lidocaine   4% Patch 1 Patch Transdermal at bedtime  lidocaine   4% Patch 1 Patch Transdermal at bedtime  melatonin Liquid 3 milliGRAM(s) Oral at bedtime  multivitamin/minerals/iron Oral Solution (CENTRUM) 15 milliLiter(s) Oral daily  pantoprazole  Injectable 40 milliGRAM(s) IV Push every 12 hours  predniSONE  Solution 5 milliGRAM(s) Oral <User Schedule>  QUEtiapine 12.5 milliGRAM(s) Oral <User Schedule>  simethicone 80 milliGRAM(s) Chew every 6 hours  surgical lubricant sterile 1 Application(s) Topical every 8 hours  vancomycin    Solution 125 milliGRAM(s) Oral every 12 hours    MEDICATIONS  (PRN):  calamine/zinc oxide Lotion 1 Application(s) Topical daily PRN Rash and/or Itching  HYDROmorphone  Injectable 1 milliGRAM(s) IV Push every 6 hours PRN Severe Pain (7 - 10)  HYDROmorphone  Injectable 0.5 milliGRAM(s) IV Push every 4 hours PRN Moderate Pain (4 - 6)  ondansetron Injectable 4 milliGRAM(s) IV Push every 8 hours PRN Nausea and/or Vomiting  sodium chloride 0.65% Nasal 1 Spray(s) Both Nostrils every 12 hours PRN Congestion      FAMILY HISTORY:      SOCIAL HISTORY: No EtOH, no tobacco        VITALS:   T(F): 97.7 (04-16-24 @ 05:20), Max: 98.3 (04-15-24 @ 16:32)  HR: 92 (04-16-24 @ 09:17)  BP: 109/63 (04-16-24 @ 05:20)  RR: 15 (04-16-24 @ 09:16)  SpO2: 98% (04-16-24 @ 09:17)  Wt(kg): --    PHYSICAL EXAM    GENERAL: Tracheostomy: #8 distal XLT cuffed irwinley  HEAD:  Atraumatic, Normocephalic  EYES: EOMI, PERRLA, conjunctiva and scleral erythema  NECK: Supple, No JVD  CHEST/LUNG: Course breath sounds bilaterally; No wheeze  HEART: Regular rate and rhythm; No murmurs, rubs, or gallops  ABDOMEN: Soft, Nontender, Nondistended; Bowel sounds present; PEG in place  EXTREMITIES:  2+ Peripheral Pulses, No clubbing, cyanosis, or edema  NEUROLOGY: non-focal  SKIN: No rashes or lesions; sacral DTI    LABS:                         8.9    3.18  )-----------( 97       ( 16 Apr 2024 01:03 )             28.9     04-16    138  |  106  |  <4<L>  ----------------------------<  119<H>  4.5   |  20<L>  |  <0.30<L>    Ca    9.4      16 Apr 2024 01:03  Phos  2.8     04-16  Mg     1.4     04-16    TPro  5.8<L>  /  Alb  2.6<L>  /  TBili  2.8<H>  /  DBili  x   /  AST  28  /  ALT  28  /  AlkPhos  49  04-16    Magnesium: 1.4 mg/dL (04-16 @ 01:03)  Phosphorus: 2.8 mg/dL (04-16 @ 01:03)  Magnesium: 1.7 mg/dL (04-15 @ 10:35)  Phosphorus: 2.9 mg/dL (04-15 @ 10:35)    PT/INR - ( 16 Apr 2024 01:04 )   PT: 15.3 sec;   INR: 1.40 ratio         PTT - ( 16 Apr 2024 01:04 )  PTT:35.8 sec  Catheterized Catheterized  04-12 @ 13:30   No growth  --  --      .Blood Blood-Peripheral  04-12 @ 12:16   No growth at 72 Hours  --  --      .Blood Blood-Peripheral  04-11 @ 07:19   No growth at 4 days  --  --      .Blood Blood-Peripheral  04-09 @ 18:55   No growth at 5 days  --  --      .Sputum Sputum  04-07 @ 14:31   Mixed gram negative rods Culture includes  Moderate Stenotrophomonas maltophilia  Few Pseudomonas aeruginosa (Carbapenem Resistant)  --  Stenotrophomonas maltophilia  Pseudomonas aeruginosa (Carbapenem Resistant)      .Blood Blood-Peripheral  04-07 @ 07:21   No growth at 5 days  --  Blood Culture PCR  Serratia marcescens      .Sputum Sputum  04-05 @ 06:43   Numerous Pseudomonas aeruginosa  Normal Respiratory Shanda present  --  Pseudomonas aeruginosa      .Blood Blood-Peripheral  04-04 @ 21:00   No growth at 5 days  --  --      Clean Catch Clean Catch (Midstream)  04-03 @ 02:07   <10,000 CFU/mL Normal Urogenital Shanda  --  --      .Blood Blood-Peripheral  04-02 @ 23:39   No growth at 5 days  --  --          IMAGING:

## 2024-04-16 NOTE — BRIEF OPERATIVE NOTE - OPERATION/FINDINGS
Endoscopic assisted closure of PEG site. Endoscopic assisted percutaneous gastrojejunostomy tube placement.

## 2024-04-16 NOTE — PROGRESS NOTE ADULT - PROBLEM SELECTOR PLAN 3
- Was Being treated for C diff as outpatient with follow up with Infectious disease, Dr Newman   - Completed fidaxomicin (total 10 day course) 4/3 -->4/13  - ID recommends ZinPlava 50mg IV to be given to reduce Cdiff infections (is not carried in hospital)  - Meropenem completed yesterday; will continue enteral Vanco for additional 5 days ( Ends 4/19)   - Infectious diease continues to follow - Was Being treated for C diff as outpatient with follow up with Infectious disease, Dr Newman   - Completed fidaxomicin (total 10 day course) 4/3 -->4/13  - ID recommends ZinPlava 50mg IV to be given to reduce Cdiff infections (is not carried in hospital)  - continued enteral vanco with IV antibiotics course  - PO vanc extended for tail end coverage on 4/14, taper as per ID notes - complete treatment fully 5/23  - Infectious disease continues to follow

## 2024-04-16 NOTE — PRE PROCEDURE NOTE - PRE PROCEDURE EVALUATION
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      H/O tracheostomy      PEG (percutaneous endoscopic gastrostomy) status        ALLERGIES:  pineapple (Unknown)  Tagamet (Unknown)  heparin (Unknown)  walnut (Unknown)  metronidazole (Rash)  penicillin (Unknown)  Lyrica (Unknown)  meropenem (Rash)  Pecan, Filbert, Hazelnut (Unknown)    HOME MEDICATIONS:  acetaminophen 160 mg/5 mL oral suspension: 650 milliliter(s) by gastrostomy tube every 6 hours as needed for  moderate pain  ascorbic acid 500 mg oral tablet: 1 tab(s) by gastrostomy tube once a day  Dificid 200 MG Oral Tablet:   diphenhydrAMINE 12.5 mg/5 mL oral liquid: 10 milliliter(s) by gastrostomy tube every 6 hours as needed for Rash and/or Itching  DuoNeb 0.5 mg-2.5 mg/3 mL inhalation solution: 3 milliliter(s) by nebulizer every 6 hours as needed for  shortness of breath and/or wheezing  gabapentin 250 mg/5 mL oral solution: 100 milligram(s) by gastrostomy tube 2 times a day 9 AM, 9PM  Lantus 100 units/mL subcutaneous solution: 15 subcutaneous once a day at 1500  Multiple Vitamins with Minerals oral liquid: 15 milliliter(s) by gastrostomy tube once a day  NovoLIN R 100 units/mL injectable solution: 10 unit(s) injectable 2 times a day 6Am, 6PM  NovoLIN R FlexPen 100 units/mL injectable solution: 12 unit(s) injectable once a day at 12pm  pantoprazole 40 mg oral granule, delayed release: 40 milligram(s) by gastrostomy tube once a day  predniSONE 5 mg/5 mL oral solution: 5 milliliter(s) by gastrostomy tube once a day 9AM  saliva substitutes oral solution: 5 milliliter(s) orally every 6 hours swish and spit  simethicone 80 mg oral tablet, chewable: 125 milligram(s) by gastrostomy tube 2 times a day  sodium chloride 0.65% nasal spray: 1 spray(s) nasal 2 times a day both nostrils  Systane ophthalmic solution: 2 drop(s) in each eye every 6 hours both eyes  Tobralcon 0.3% ophthalmic solution: 2 drop(s) in each affected eye every 6 hours  zinc oxide 40% topical ointment: Apply topically to affected area once a day    AICD/PPM: [ ] yes   [ ] no    PERTINENT LAB DATA:                        8.9    3.18  )-----------( 97       ( 16 Apr 2024 01:03 )             28.9     04-16    138  |  106  |  <4<L>  ----------------------------<  119<H>  4.5   |  20<L>  |  <0.30<L>    Ca    9.4      16 Apr 2024 01:03  Phos  2.8     04-16  Mg     1.4     04-16    TPro  5.8<L>  /  Alb  2.6<L>  /  TBili  2.8<H>  /  DBili  x   /  AST  28  /  ALT  28  /  AlkPhos  49  04-16    PT/INR - ( 16 Apr 2024 01:04 )   PT: 15.3 sec;   INR: 1.40 ratio         PTT - ( 16 Apr 2024 01:04 )  PTT:35.8 sec            PHYSICAL EXAMINATION:    T(C): 36.3  HR: 96  BP: 150/65  RR: 20  SpO2: 100%    Constitutional: NAD  HEENT: PERRLA, EOMI,    Neck:  No JVD  Respiratory: CTAB/L  Cardiovascular: S1 and S2  Gastrointestinal: BS+, soft, NT/ND  Extremities: No peripheral edema  Neurological: A/O x 3, no focal deficits  Psychiatric: Normal mood, normal affect  Skin: No rashes    ASA Class: I [ ]  II [ ]  III [ ]  IV [x ]    COMMENTS:    The patient is a suitable candidate for the planned procedure unless box checked [ ]  No, explain:

## 2024-04-16 NOTE — PROGRESS NOTE ADULT - PROBLEM SELECTOR PLAN 2
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  - Chest PT, Duonebs q 6 hrs 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 since 10/2022   - S/p Trach # 8 Distal XLT Veronicaley Cuffed   - Home Vent: volume /12/30%/+5     - Settings changed on 4/11 to 350/12/30%/+5 as she complained of dyspnea  - Chest PT, Duonebs q 6 hrs

## 2024-04-16 NOTE — CHART NOTE - NSCHARTNOTEFT_GEN_A_CORE
Called about concern for RA eye involvement. 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 Pt follows with Rheumatology Dr. Egan outpatient. Discussed with primary team, recommend that pt's outpatient rheumatologist be notified about admission and concern for inflammatory eye disease. Defer decision to change outpatient DMARD therapy to pt's own rheumatologist. Pt also currently being treated for an active C. diff infection, thus would not start immunosuppressives inpatient.     D/w primary team  D/w attending Dr. Myra Vasquez MD  Rheumatology Fellow  Available on TEAMS

## 2024-04-16 NOTE — CHART NOTE - NSCHARTNOTEFT_GEN_A_CORE
Post Operative Check    Patient is post op from a Gastrojejunostomy, percutaneous, endoscopic, for feeding tube placement,   and  Closure, gastrostomy     and is doing well.      Vitals  T(C): 36.4 (04-16-24 @ 16:13), Max: 36.7 (04-16-24 @ 00:00)  HR: 88 (04-16-24 @ 17:50) (70 - 710)  BP: 122/59 (04-16-24 @ 16:13) (109/63 - 158/70)  RR: 22 (04-16-24 @ 16:13) (12 - 22)  SpO2: 98% (04-16-24 @ 17:50) (98% - 100%)      04-15 @ 07:01  -  04-16 @ 07:00  --------------------------------------------------------  IN:    Enteral Tube Flush: 30 mL    Lactated Ringers: 600 mL  Total IN: 630 mL    OUT:    Voided (mL): 900 mL  Total OUT: 900 mL    Total NET: -270 mL      04-16 @ 07:01  -  04-16 @ 18:40  --------------------------------------------------------  IN:    Enteral Tube Flush: 110 mL    Lactated Ringers: 300 mL  Total IN: 410 mL    OUT:  Total OUT: 0 mL    Total NET: 410 mL          Labs                        8.9    3.18  )-----------( 97       ( 16 Apr 2024 01:03 )             28.9       CBC Full  -  ( 16 Apr 2024 01:03 )  WBC Count : 3.18 K/uL  Hemoglobin : 8.9 g/dL  Hematocrit : 28.9 %  Platelet Count - Automated : 97 K/uL  Mean Cell Volume : 87.3 fl  Mean Cell Hemoglobin : 26.9 pg  Mean Cell Hemoglobin Concentration : 30.8 gm/dL  Auto Neutrophil # : 1.97 K/uL  Auto Lymphocyte # : 0.88 K/uL  Auto Monocyte # : 0.28 K/uL  Auto Eosinophil # : 0.01 K/uL  Auto Basophil # : 0.02 K/uL  Auto Neutrophil % : 59.2 %  Auto Lymphocyte % : 26.4 %  Auto Monocyte % : 8.4 %  Auto Eosinophil % : 0.3 %  Auto Basophil % : 0.6 %      Physical Exam  General: resting comfortably in bed  Abdomen: GJ with dry dressing below bumper, tube at 4cm at bottom of bumper; dressing at previous PEG site with serosang staining, with tegaderm overtop. Abdomen soft, NT, ND.      Patient is a 74y old Female post op from a Gastrojejunostomy, percutaneous, and gastrostomy closure.    > Hold tube feeds for now.  > Continue care per primary team.

## 2024-04-16 NOTE — BRIEF OPERATIVE NOTE - NSICDXBRIEFPROCEDURE_GEN_ALL_CORE_FT
PROCEDURES:  Gastrojejunostomy, percutaneous, endoscopic, for feeding tube placement 16-Apr-2024 14:34:24  Cedric Ramires  Closure, gastrostomy 16-Apr-2024 14:34:39  Cedric Ramires

## 2024-04-17 DIAGNOSIS — Z02.9 ENCOUNTER FOR ADMINISTRATIVE EXAMINATIONS, UNSPECIFIED: ICD-10-CM

## 2024-04-17 DIAGNOSIS — M06.9 RHEUMATOID ARTHRITIS, UNSPECIFIED: ICD-10-CM

## 2024-04-17 LAB
ALBUMIN SERPL ELPH-MCNC: 2.3 G/DL — LOW (ref 3.3–5)
ALP SERPL-CCNC: 48 U/L — SIGNIFICANT CHANGE UP (ref 40–120)
ALT FLD-CCNC: 26 U/L — SIGNIFICANT CHANGE UP (ref 10–45)
ANION GAP SERPL CALC-SCNC: 10 MMOL/L — SIGNIFICANT CHANGE UP (ref 5–17)
APTT BLD: 32.1 SEC — SIGNIFICANT CHANGE UP (ref 24.5–35.6)
AST SERPL-CCNC: 38 U/L — SIGNIFICANT CHANGE UP (ref 10–40)
BASOPHILS # BLD AUTO: 0.03 K/UL — SIGNIFICANT CHANGE UP (ref 0–0.2)
BASOPHILS NFR BLD AUTO: 0.5 % — SIGNIFICANT CHANGE UP (ref 0–2)
BILIRUB SERPL-MCNC: 2.6 MG/DL — HIGH (ref 0.2–1.2)
BUN SERPL-MCNC: <4 MG/DL — LOW (ref 7–23)
CALCIUM SERPL-MCNC: 8.5 MG/DL — SIGNIFICANT CHANGE UP (ref 8.4–10.5)
CHLORIDE SERPL-SCNC: 103 MMOL/L — SIGNIFICANT CHANGE UP (ref 96–108)
CO2 SERPL-SCNC: 23 MMOL/L — SIGNIFICANT CHANGE UP (ref 22–31)
CREAT SERPL-MCNC: <0.3 MG/DL — LOW (ref 0.5–1.3)
CULTURE RESULTS: SIGNIFICANT CHANGE UP
EGFR: 113 ML/MIN/1.73M2 — SIGNIFICANT CHANGE UP
EOSINOPHIL # BLD AUTO: 0.01 K/UL — SIGNIFICANT CHANGE UP (ref 0–0.5)
EOSINOPHIL NFR BLD AUTO: 0.2 % — SIGNIFICANT CHANGE UP (ref 0–6)
FERRITIN SERPL-MCNC: 1609 NG/ML — HIGH (ref 13–330)
GLUCOSE BLDC GLUCOMTR-MCNC: 119 MG/DL — HIGH (ref 70–99)
GLUCOSE BLDC GLUCOMTR-MCNC: 133 MG/DL — HIGH (ref 70–99)
GLUCOSE BLDC GLUCOMTR-MCNC: 192 MG/DL — HIGH (ref 70–99)
GLUCOSE BLDC GLUCOMTR-MCNC: 215 MG/DL — HIGH (ref 70–99)
GLUCOSE SERPL-MCNC: 129 MG/DL — HIGH (ref 70–99)
HAPTOGLOB SERPL-MCNC: 118 MG/DL — SIGNIFICANT CHANGE UP (ref 34–200)
HCT VFR BLD CALC: 29 % — LOW (ref 34.5–45)
HGB BLD-MCNC: 8.4 G/DL — LOW (ref 11.5–15.5)
IMM GRANULOCYTES NFR BLD AUTO: 3.7 % — HIGH (ref 0–0.9)
INR BLD: 1.42 RATIO — HIGH (ref 0.85–1.18)
IRON SATN MFR SERPL: 53 % — HIGH (ref 14–50)
IRON SATN MFR SERPL: 66 UG/DL — SIGNIFICANT CHANGE UP (ref 30–160)
LYMPHOCYTES # BLD AUTO: 0.97 K/UL — LOW (ref 1–3.3)
LYMPHOCYTES # BLD AUTO: 17.2 % — SIGNIFICANT CHANGE UP (ref 13–44)
MAGNESIUM SERPL-MCNC: 1.5 MG/DL — LOW (ref 1.6–2.6)
MCHC RBC-ENTMCNC: 26 PG — LOW (ref 27–34)
MCHC RBC-ENTMCNC: 29 GM/DL — LOW (ref 32–36)
MCV RBC AUTO: 89.8 FL — SIGNIFICANT CHANGE UP (ref 80–100)
MONOCYTES # BLD AUTO: 0.4 K/UL — SIGNIFICANT CHANGE UP (ref 0–0.9)
MONOCYTES NFR BLD AUTO: 7.1 % — SIGNIFICANT CHANGE UP (ref 2–14)
NEUTROPHILS # BLD AUTO: 4.02 K/UL — SIGNIFICANT CHANGE UP (ref 1.8–7.4)
NEUTROPHILS NFR BLD AUTO: 71.3 % — SIGNIFICANT CHANGE UP (ref 43–77)
NRBC # BLD: 0 /100 WBCS — SIGNIFICANT CHANGE UP (ref 0–0)
PHOSPHATE SERPL-MCNC: 2.9 MG/DL — SIGNIFICANT CHANGE UP (ref 2.5–4.5)
PLATELET # BLD AUTO: 118 K/UL — LOW (ref 150–400)
POTASSIUM SERPL-MCNC: 3.4 MMOL/L — LOW (ref 3.5–5.3)
POTASSIUM SERPL-SCNC: 3.4 MMOL/L — LOW (ref 3.5–5.3)
PROT SERPL-MCNC: 5.7 G/DL — LOW (ref 6–8.3)
PROTHROM AB SERPL-ACNC: 15.5 SEC — HIGH (ref 9.5–13)
RBC # BLD: 3.23 M/UL — LOW (ref 3.8–5.2)
RBC # FLD: 19.9 % — HIGH (ref 10.3–14.5)
SODIUM SERPL-SCNC: 136 MMOL/L — SIGNIFICANT CHANGE UP (ref 135–145)
SPECIMEN SOURCE: SIGNIFICANT CHANGE UP
TIBC SERPL-MCNC: 124 UG/DL — LOW (ref 220–430)
UIBC SERPL-MCNC: 59 UG/DL — LOW (ref 110–370)
WBC # BLD: 5.64 K/UL — SIGNIFICANT CHANGE UP (ref 3.8–10.5)
WBC # FLD AUTO: 5.64 K/UL — SIGNIFICANT CHANGE UP (ref 3.8–10.5)

## 2024-04-17 PROCEDURE — 99232 SBSQ HOSP IP/OBS MODERATE 35: CPT

## 2024-04-17 PROCEDURE — 99024 POSTOP FOLLOW-UP VISIT: CPT

## 2024-04-17 PROCEDURE — 99221 1ST HOSP IP/OBS SF/LOW 40: CPT

## 2024-04-17 RX ORDER — VANCOMYCIN HCL 1 G
125 VIAL (EA) INTRAVENOUS DAILY
Refills: 0 | Status: DISCONTINUED | OUTPATIENT
Start: 2024-04-26 | End: 2024-05-01

## 2024-04-17 RX ORDER — VANCOMYCIN HCL 1 G
125 VIAL (EA) INTRAVENOUS
Refills: 0 | Status: CANCELLED | OUTPATIENT
Start: 2024-05-03 | End: 2024-05-01

## 2024-04-17 RX ORDER — POTASSIUM CHLORIDE 20 MEQ
10 PACKET (EA) ORAL
Refills: 0 | Status: COMPLETED | OUTPATIENT
Start: 2024-04-17 | End: 2024-04-17

## 2024-04-17 RX ORDER — VANCOMYCIN HCL 1 G
125 VIAL (EA) INTRAVENOUS
Refills: 0 | Status: CANCELLED | OUTPATIENT
Start: 2024-05-10 | End: 2024-05-01

## 2024-04-17 RX ORDER — VANCOMYCIN HCL 1 G
125 VIAL (EA) INTRAVENOUS EVERY 6 HOURS
Refills: 0 | Status: COMPLETED | OUTPATIENT
Start: 2024-04-17 | End: 2024-04-18

## 2024-04-17 RX ORDER — ACETAMINOPHEN 500 MG
650 TABLET ORAL EVERY 6 HOURS
Refills: 0 | Status: DISCONTINUED | OUTPATIENT
Start: 2024-04-17 | End: 2024-04-19

## 2024-04-17 RX ORDER — PHYTONADIONE (VIT K1) 5 MG
10 TABLET ORAL DAILY
Refills: 0 | Status: COMPLETED | OUTPATIENT
Start: 2024-04-17 | End: 2024-04-19

## 2024-04-17 RX ORDER — MAGNESIUM SULFATE 500 MG/ML
1 VIAL (ML) INJECTION ONCE
Refills: 0 | Status: COMPLETED | OUTPATIENT
Start: 2024-04-17 | End: 2024-04-17

## 2024-04-17 RX ORDER — VANCOMYCIN HCL 1 G
125 VIAL (EA) INTRAVENOUS EVERY 12 HOURS
Refills: 0 | Status: COMPLETED | OUTPATIENT
Start: 2024-04-19 | End: 2024-04-25

## 2024-04-17 RX ADMIN — GABAPENTIN 100 MILLIGRAM(S): 400 CAPSULE ORAL at 22:11

## 2024-04-17 RX ADMIN — Medication 5 MILLILITER(S): at 23:04

## 2024-04-17 RX ADMIN — Medication 650 MILLIGRAM(S): at 15:00

## 2024-04-17 RX ADMIN — Medication 125 MILLIGRAM(S): at 05:59

## 2024-04-17 RX ADMIN — Medication 1 APPLICATION(S): at 06:07

## 2024-04-17 RX ADMIN — SIMETHICONE 80 MILLIGRAM(S): 80 TABLET, CHEWABLE ORAL at 11:56

## 2024-04-17 RX ADMIN — LIDOCAINE 1 PATCH: 4 CREAM TOPICAL at 10:41

## 2024-04-17 RX ADMIN — Medication 5 MILLILITER(S): at 05:57

## 2024-04-17 RX ADMIN — Medication 1 APPLICATION(S): at 13:11

## 2024-04-17 RX ADMIN — Medication 125 MILLIGRAM(S): at 22:11

## 2024-04-17 RX ADMIN — ESCITALOPRAM OXALATE 10 MILLIGRAM(S): 10 TABLET, FILM COATED ORAL at 22:12

## 2024-04-17 RX ADMIN — Medication 1: at 00:36

## 2024-04-17 RX ADMIN — Medication 3 MILLILITER(S): at 17:45

## 2024-04-17 RX ADMIN — Medication 500 MILLIGRAM(S): at 11:56

## 2024-04-17 RX ADMIN — Medication 5 MILLILITER(S): at 11:55

## 2024-04-17 RX ADMIN — GABAPENTIN 100 MILLIGRAM(S): 400 CAPSULE ORAL at 08:35

## 2024-04-17 RX ADMIN — Medication 3 MILLILITER(S): at 11:20

## 2024-04-17 RX ADMIN — LIDOCAINE 1 PATCH: 4 CREAM TOPICAL at 10:42

## 2024-04-17 RX ADMIN — Medication 10 MILLIGRAM(S): at 12:19

## 2024-04-17 RX ADMIN — CHLORHEXIDINE GLUCONATE 15 MILLILITER(S): 213 SOLUTION TOPICAL at 05:57

## 2024-04-17 RX ADMIN — Medication 1 APPLICATION(S): at 22:18

## 2024-04-17 RX ADMIN — Medication 650 MILLIGRAM(S): at 16:00

## 2024-04-17 RX ADMIN — Medication 100 GRAM(S): at 13:10

## 2024-04-17 RX ADMIN — CHLORHEXIDINE GLUCONATE 15 MILLILITER(S): 213 SOLUTION TOPICAL at 17:59

## 2024-04-17 RX ADMIN — PANTOPRAZOLE SODIUM 40 MILLIGRAM(S): 20 TABLET, DELAYED RELEASE ORAL at 05:54

## 2024-04-17 RX ADMIN — Medication 1 APPLICATION(S): at 13:10

## 2024-04-17 RX ADMIN — LIDOCAINE 1 PATCH: 4 CREAM TOPICAL at 08:51

## 2024-04-17 RX ADMIN — LIDOCAINE 1 PATCH: 4 CREAM TOPICAL at 08:52

## 2024-04-17 RX ADMIN — HYDROMORPHONE HYDROCHLORIDE 0.5 MILLIGRAM(S): 2 INJECTION INTRAMUSCULAR; INTRAVENOUS; SUBCUTANEOUS at 09:35

## 2024-04-17 RX ADMIN — HYDROMORPHONE HYDROCHLORIDE 0.5 MILLIGRAM(S): 2 INJECTION INTRAMUSCULAR; INTRAVENOUS; SUBCUTANEOUS at 08:35

## 2024-04-17 RX ADMIN — CHLORHEXIDINE GLUCONATE 1 APPLICATION(S): 213 SOLUTION TOPICAL at 05:51

## 2024-04-17 RX ADMIN — SIMETHICONE 80 MILLIGRAM(S): 80 TABLET, CHEWABLE ORAL at 06:00

## 2024-04-17 RX ADMIN — Medication 87.5 MICROGRAM(S): at 05:54

## 2024-04-17 RX ADMIN — Medication 1 DROP(S): at 11:55

## 2024-04-17 RX ADMIN — PANTOPRAZOLE SODIUM 40 MILLIGRAM(S): 20 TABLET, DELAYED RELEASE ORAL at 18:00

## 2024-04-17 RX ADMIN — Medication 3 MILLILITER(S): at 23:02

## 2024-04-17 RX ADMIN — Medication 3 MILLILITER(S): at 05:44

## 2024-04-17 RX ADMIN — Medication 100 MILLIEQUIVALENT(S): at 10:31

## 2024-04-17 RX ADMIN — INSULIN GLARGINE 5 UNIT(S): 100 INJECTION, SOLUTION SUBCUTANEOUS at 17:59

## 2024-04-17 RX ADMIN — Medication 15 MILLILITER(S): at 11:56

## 2024-04-17 RX ADMIN — Medication 1 APPLICATION(S): at 06:01

## 2024-04-17 RX ADMIN — Medication 2: at 18:00

## 2024-04-17 RX ADMIN — SIMETHICONE 80 MILLIGRAM(S): 80 TABLET, CHEWABLE ORAL at 22:14

## 2024-04-17 RX ADMIN — Medication 1 DROP(S): at 23:08

## 2024-04-17 RX ADMIN — LIDOCAINE 1 PATCH: 4 CREAM TOPICAL at 22:17

## 2024-04-17 RX ADMIN — Medication 5 MILLILITER(S): at 00:29

## 2024-04-17 RX ADMIN — Medication 100 MILLIEQUIVALENT(S): at 11:32

## 2024-04-17 RX ADMIN — Medication 1 DROP(S): at 00:32

## 2024-04-17 RX ADMIN — SIMETHICONE 80 MILLIGRAM(S): 80 TABLET, CHEWABLE ORAL at 23:05

## 2024-04-17 RX ADMIN — SIMETHICONE 80 MILLIGRAM(S): 80 TABLET, CHEWABLE ORAL at 00:29

## 2024-04-17 RX ADMIN — Medication 5 MILLILITER(S): at 17:59

## 2024-04-17 RX ADMIN — Medication 1 DROP(S): at 17:59

## 2024-04-17 RX ADMIN — Medication 1 APPLICATION(S): at 22:20

## 2024-04-17 RX ADMIN — QUETIAPINE FUMARATE 12.5 MILLIGRAM(S): 200 TABLET, FILM COATED ORAL at 15:00

## 2024-04-17 RX ADMIN — Medication 1 DROP(S): at 06:07

## 2024-04-17 RX ADMIN — AMLODIPINE BESYLATE 10 MILLIGRAM(S): 2.5 TABLET ORAL at 08:37

## 2024-04-17 RX ADMIN — Medication 5 MILLIGRAM(S): at 12:10

## 2024-04-17 RX ADMIN — Medication 3 MILLIGRAM(S): at 22:13

## 2024-04-17 NOTE — CHART NOTE - NSCHARTNOTEFT_GEN_A_CORE
Nutrition Follow Up Note  Patient seen for: follow up    Source: [] Patient       [x] Medical Record        [x] Nursing        [] Family at bedside       [x] Other: interdisciplinary medical team     -If unable to interview patient: [x] Trach/Vent/BiPAP  [] Disoriented/confused/inappropriate to interview    Diet Order:   Diet, NPO with Tube Feed:   Tube Feeding Modality: Gastrostomy  Casie Frazier Peptide 1.5 (KFPEPT1.5RTH)  Total Volume for 24 Hours (mL): 320  Continuous  Starting Tube Feed Rate {mL per Hour}: 20  Until Goal Tube Feed Rate (mL per Hour): 20  Tube Feed Duration (in Hours): 16  Tube Feed Start Time: 06:00  Tube Feed Stop Time: 22:00  Free Water Flush  Pump   Rate (mL per Hour): 20   Frequency: Every 2 Hours  Alfred(7 Gm Arginine/7 Gm Glut/1.2 Gm HMB     Qty per Day:  2 (04-17-24)    EN order providing at 100% provision: 493kcal (34% EER) and 24g protein   Pt previously NPO 4/14-4/17.     - Is current order appropriate/adequate? see below for recommendations     Nutrition-related concerns:  -admitted for dislodged PEG tube. now s/p G-J tube placement yesterday. Per GI: feeds thru j-tube, vent g-tube as needed.    -Pt vegan, but per previous discussion with daughter, Alfred is okay. See nutrition note 11/2/24.  -potassium and magnesium low, replenished per orders.   -Hgb A1c 6.2%. insulin regimen ordered to maintain glycemic control.     GI: rectal tube in place, see flow sheets for output. Bowel Regimen? [] Yes   [x] No    Dosing weight 106 pounds 4/16   4/10 117.2 pounds  UBW 90 pounds  Changes may be due to fluid shifts, differences in scales.   RD will continue to monitor trends.     Nutritionally Pertinent MEDICATIONS  (STANDING):  amLODIPine   Tablet  ascorbic acid  insulin glargine Injectable (LANTUS)  insulin lispro (ADMELOG) corrective regimen sliding scale  levothyroxine Injectable  magnesium sulfate  IVPB  multivitamin/minerals/iron Oral Solution (CENTRUM)  pantoprazole  Injectable  phytonadione   Solution  predniSONE  Solution  simethicone  vancomycin    Solution    Pertinent Labs: 04-17 @ 06:57: Na 136, BUN <4<L>, Cr <0.30<L>, <H>, K+ 3.4<L>, Phos 2.9, Mg 1.5<L>, Alk Phos 48, ALT/SGPT 26, AST/SGOT 38, HbA1c --    A1C with Estimated Average Glucose Result: 6.2 % (04-04-24 @ 07:40)  A1C with Estimated Average Glucose Result: 7.5 % (10-28-23 @ 07:18)    Finger Sticks:  POCT Blood Glucose.: 119 mg/dL (04-17 @ 11:59)  POCT Blood Glucose.: 133 mg/dL (04-17 @ 06:00)  POCT Blood Glucose.: 165 mg/dL (04-16 @ 23:57)  POCT Blood Glucose.: 117 mg/dL (04-16 @ 17:29)    Skin per nursing documentation: sacrum stage 3 per flow sheets   Edema per nursing documentation: 2+ generalized, 3+ ruth hand     Estimated Needs based on dosing weight 48.1kg   30-35kcal/kg 1443-1683kcal/day  1.3-1.6g/kg 62-77g protein/day  defer fluid needs to team     Previous Nutrition Diagnosis: Increased Nutrient Needs   Nutrition Diagnosis is: [x] ongoing  [] resolved [] not applicable     Nutrition Care Plan:  [x] In Progress  [] Achieved  [] Not applicable    New Nutrition Diagnosis: severe acute malnutrition related to acuteness of illness as evidenced by 2+ edema, meeting </= 50% of EER x >/= 5 days.     Nutrition Interventions:     Education Provided   [] Yes:  [x] No:     Recommendations:      -Currently on trickle feeds s/p G-J tube placement. Defer tube feeding advancement to team. When/if able, can slowly advanced to Goal while strictly monitoring tolerance: Goal is [employer] Peptide 1.5 @ 65mL/hr x 16hrs is reached. This will provide pt with 1040ml, 1602kcal (33kcal/kg) and 77g protein  (1.6g/kg). If unable to advance tube feeding to Goal, may need to consider PN.   -Defer free water flush to team   -Continue Multivitamin and Vitamin C to aid in wound healing  -Continue Alfred to aid in wound healing     Monitoring and Evaluation:   Continue to monitor nutritional intake, tolerance to diet prescription, weights, labs, skin integrity    RD remains available upon request and will follow up per protocol  Rufina Morris MS, RD, CDN / Teams Nutrition Follow Up Note  Patient seen for: follow up    Source: [] Patient       [x] Medical Record        [x] Nursing        [] Family at bedside       [x] Other: interdisciplinary medical team     -If unable to interview patient: [x] Trach/Vent/BiPAP  [] Disoriented/confused/inappropriate to interview    Diet Order:   Diet, NPO with Tube Feed:   Tube Feeding Modality: Gastrostomy  Casie Frazier Peptide 1.5 (KFPEPT1.5RTH)  Total Volume for 24 Hours (mL): 320  Continuous  Starting Tube Feed Rate {mL per Hour}: 20  Until Goal Tube Feed Rate (mL per Hour): 20  Tube Feed Duration (in Hours): 16  Tube Feed Start Time: 06:00  Tube Feed Stop Time: 22:00  Free Water Flush  Pump   Rate (mL per Hour): 20   Frequency: Every 2 Hours  Alfred(7 Gm Arginine/7 Gm Glut/1.2 Gm HMB     Qty per Day:  2 (04-17-24)    EN order providing at 100% provision: 493kcal (34% EER) and 24g protein   Pt previously NPO 4/14-4/17.     - Is current order appropriate/adequate? see below for recommendations     Nutrition-related concerns:  -admitted for dislodged PEG tube. now s/p G-J tube placement yesterday. Per GI: feeds thru j-tube, vent g-tube as needed.    -Pt vegan, but per previous discussion with daughter, Alfred is okay. See nutrition note 11/2/24.  -potassium and magnesium low, replenished per orders.   -Hgb A1c 6.2%. insulin regimen ordered to maintain glycemic control.     GI: rectal tube in place, see flow sheets for output. Bowel Regimen? [] Yes   [x] No    Dosing weight 106 pounds 4/16   4/10 117.2 pounds  UBW 90 pounds  Changes may be due to fluid shifts, differences in scales.   RD will continue to monitor trends.     Nutritionally Pertinent MEDICATIONS  (STANDING):  amLODIPine   Tablet  ascorbic acid  insulin glargine Injectable (LANTUS)  insulin lispro (ADMELOG) corrective regimen sliding scale  levothyroxine Injectable  magnesium sulfate  IVPB  multivitamin/minerals/iron Oral Solution (CENTRUM)  pantoprazole  Injectable  phytonadione   Solution  predniSONE  Solution  simethicone  vancomycin    Solution    Pertinent Labs: 04-17 @ 06:57: Na 136, BUN <4<L>, Cr <0.30<L>, <H>, K+ 3.4<L>, Phos 2.9, Mg 1.5<L>, Alk Phos 48, ALT/SGPT 26, AST/SGOT 38, HbA1c --    A1C with Estimated Average Glucose Result: 6.2 % (04-04-24 @ 07:40)  A1C with Estimated Average Glucose Result: 7.5 % (10-28-23 @ 07:18)    Finger Sticks:  POCT Blood Glucose.: 119 mg/dL (04-17 @ 11:59)  POCT Blood Glucose.: 133 mg/dL (04-17 @ 06:00)  POCT Blood Glucose.: 165 mg/dL (04-16 @ 23:57)  POCT Blood Glucose.: 117 mg/dL (04-16 @ 17:29)    Skin per nursing documentation: sacrum stage 3 per flow sheets   Edema per nursing documentation: 2+ generalized, 3+ ruth hand     Estimated Needs based on dosing weight 48.1kg   30-35kcal/kg 1443-1683kcal/day  1.3-1.6g/kg 62-77g protein/day  defer fluid needs to team     Previous Nutrition Diagnosis: Increased Nutrient Needs   Nutrition Diagnosis is: [x] ongoing  [] resolved [] not applicable     Nutrition Care Plan:  [x] In Progress  [] Achieved  [] Not applicable    New Nutrition Diagnosis: severe acute malnutrition related to acuteness of illness as evidenced by 2+ edema, meeting </= 50% of EER x >/= 5 days.     Nutrition Interventions:     Education Provided   [] Yes:  [x] No:     Recommendations:      -Currently on trickle feeds s/p G-J tube placement. Defer tube feeding advancement to team. When/if able, can slowly advanced to Goal while strictly monitoring tolerance: Goal is [employer] Peptide 1.5 @ 65mL/hr x 16hrs is reached. This will provide pt with 1040ml, 1602kcal (33kcal/kg) and 77g protein  (1.6g/kg). If unable to advance tube feeding to Goal, may need to consider PN.   -Defer free water flush to team   -Continue Multivitamin and Vitamin C to aid in wound healing  -Continue Alfred to aid in wound healing   -nutrition risk placed in chart     Monitoring and Evaluation:   Continue to monitor nutritional intake, tolerance to diet prescription, weights, labs, skin integrity    RD remains available upon request and will follow up per protocol  Rufina Morris MS, RD, CDN / Teams Nutrition Follow Up Note  Patient seen for: follow up    Source: [] Patient       [x] Medical Record        [x] Nursing        [] Family at bedside       [x] Other: PA    -If unable to interview patient: [x] Trach/Vent/BiPAP  [] Disoriented/confused/inappropriate to interview    Diet Order:   Diet, NPO with Tube Feed:   Tube Feeding Modality: Gastrostomy  Casie Frazier Peptide 1.5 (KFPEPT1.5RTH)  Total Volume for 24 Hours (mL): 320  Continuous  Starting Tube Feed Rate {mL per Hour}: 20  Until Goal Tube Feed Rate (mL per Hour): 20  Tube Feed Duration (in Hours): 16  Tube Feed Start Time: 06:00  Tube Feed Stop Time: 22:00  Free Water Flush  Pump   Rate (mL per Hour): 20   Frequency: Every 2 Hours  Alfred(7 Gm Arginine/7 Gm Glut/1.2 Gm HMB     Qty per Day:  2 (04-17-24)    EN order providing at 100% provision: 493kcal (34% EER) and 24g protein   Pt previously NPO 4/14-4/17.     - Is current order appropriate/adequate? see below for recommendations     Nutrition-related concerns:  -admitted for dislodged PEG tube. now s/p G-J tube placement yesterday. Per GI: feeds thru j-tube, vent g-tube as needed.    -Pt vegan, but per previous discussion with daughter, Alfred is okay. See nutrition note 11/2/24.  -potassium and magnesium low, replenished per orders.   -Hgb A1c 6.2%. insulin regimen ordered to maintain glycemic control.     GI: rectal tube in place, see flow sheets for output. Bowel Regimen? [] Yes   [x] No    Dosing weight 106 pounds 4/16   4/10 117.2 pounds  UBW 90 pounds  Changes may be due to fluid shifts, differences in scales.   RD will continue to monitor trends.     Nutritionally Pertinent MEDICATIONS  (STANDING):  amLODIPine   Tablet  ascorbic acid  insulin glargine Injectable (LANTUS)  insulin lispro (ADMELOG) corrective regimen sliding scale  levothyroxine Injectable  magnesium sulfate  IVPB  multivitamin/minerals/iron Oral Solution (CENTRUM)  pantoprazole  Injectable  phytonadione   Solution  predniSONE  Solution  simethicone  vancomycin    Solution    Pertinent Labs: 04-17 @ 06:57: Na 136, BUN <4<L>, Cr <0.30<L>, <H>, K+ 3.4<L>, Phos 2.9, Mg 1.5<L>, Alk Phos 48, ALT/SGPT 26, AST/SGOT 38, HbA1c --    A1C with Estimated Average Glucose Result: 6.2 % (04-04-24 @ 07:40)  A1C with Estimated Average Glucose Result: 7.5 % (10-28-23 @ 07:18)    Finger Sticks:  POCT Blood Glucose.: 119 mg/dL (04-17 @ 11:59)  POCT Blood Glucose.: 133 mg/dL (04-17 @ 06:00)  POCT Blood Glucose.: 165 mg/dL (04-16 @ 23:57)  POCT Blood Glucose.: 117 mg/dL (04-16 @ 17:29)    Skin per nursing documentation: sacrum stage 3 per flow sheets   Edema per nursing documentation: 2+ generalized, 3+ ruth hand     Estimated Needs based on dosing weight 48.1kg   30-35kcal/kg 1443-1683kcal/day  1.3-1.6g/kg 62-77g protein/day  defer fluid needs to team     Previous Nutrition Diagnosis: Increased Nutrient Needs   Nutrition Diagnosis is: [x] ongoing  [] resolved [] not applicable     Nutrition Care Plan:  [x] In Progress  [] Achieved  [] Not applicable    New Nutrition Diagnosis: severe acute malnutrition related to acuteness of illness as evidenced by 2+ edema, meeting </= 50% of EER x >/= 5 days.     Nutrition Interventions:     Education Provided   [] Yes:  [x] No:     Recommendations:      -Currently on trickle feeds s/p G-J tube placement. Defer tube feeding advancement to team. When/if able, can slowly advanced to Goal while strictly monitoring tolerance: Goal is [employer] Peptide 1.5 @ 65mL/hr x 16hrs is reached. This will provide pt with 1040ml, 1602kcal (33kcal/kg) and 77g protein  (1.6g/kg). If unable to advance tube feeding to Goal, may need to consider PN.   -Defer free water flush to team   -Continue Multivitamin and Vitamin C to aid in wound healing  -Continue Alfred to aid in wound healing   -nutrition risk placed in chart     Monitoring and Evaluation:   Continue to monitor nutritional intake, tolerance to diet prescription, weights, labs, skin integrity    RD remains available upon request and will follow up per protocol  Rufina Morris, MS, RD, CDN / Teams Nutrition Follow Up Note  Patient seen for: follow up    Source: [] Patient       [x] Medical Record        [x] Nursing        [x] pt , home aid at bedside     [x] Other: PA    -If unable to interview patient: [x] Trach/Vent/BiPAP  [] Disoriented/confused/inappropriate to interview    Diet Order:   Diet, NPO with Tube Feed:   Tube Feeding Modality: Gastrostomy  Casie Frazier Peptide 1.5 (KFPEPT1.5RTH)  Total Volume for 24 Hours (mL): 320  Continuous  Starting Tube Feed Rate {mL per Hour}: 20  Until Goal Tube Feed Rate (mL per Hour): 20  Tube Feed Duration (in Hours): 16  Tube Feed Start Time: 06:00  Tube Feed Stop Time: 22:00  Free Water Flush  Pump   Rate (mL per Hour): 20   Frequency: Every 2 Hours  Alfred(7 Gm Arginine/7 Gm Glut/1.2 Gm HMB     Qty per Day:  2 (04-17-24)    EN order providing at 100% provision: 493kcal (34% EER) and 24g protein   Pt previously NPO 4/14-4/17.     - Is current order appropriate/adequate? see below for recommendations     Nutrition-related concerns:  -admitted for dislodged PEG tube. now s/p G-J tube placement yesterday. Per GI: feeds thru j-tube, vent g-tube as needed.    -Pt vegan, but per previous discussion with daughter, Alfred is okay. See nutrition note 11/2/24.  -potassium and magnesium low, replenished per orders.   -Hgb A1c 6.2%. insulin regimen ordered to maintain glycemic control.     GI: rectal tube in place, see flow sheets for output. Bowel Regimen? [] Yes   [x] No    Dosing weight 106 pounds 4/16   4/10 117.2 pounds  UBW 90 pounds  Changes may be due to fluid shifts, differences in scales.   RD will continue to monitor trends.     Nutritionally Pertinent MEDICATIONS  (STANDING):  amLODIPine   Tablet  ascorbic acid  insulin glargine Injectable (LANTUS)  insulin lispro (ADMELOG) corrective regimen sliding scale  levothyroxine Injectable  magnesium sulfate  IVPB  multivitamin/minerals/iron Oral Solution (CENTRUM)  pantoprazole  Injectable  phytonadione   Solution  predniSONE  Solution  simethicone  vancomycin    Solution    Pertinent Labs: 04-17 @ 06:57: Na 136, BUN <4<L>, Cr <0.30<L>, <H>, K+ 3.4<L>, Phos 2.9, Mg 1.5<L>, Alk Phos 48, ALT/SGPT 26, AST/SGOT 38, HbA1c --    A1C with Estimated Average Glucose Result: 6.2 % (04-04-24 @ 07:40)  A1C with Estimated Average Glucose Result: 7.5 % (10-28-23 @ 07:18)    Finger Sticks:  POCT Blood Glucose.: 119 mg/dL (04-17 @ 11:59)  POCT Blood Glucose.: 133 mg/dL (04-17 @ 06:00)  POCT Blood Glucose.: 165 mg/dL (04-16 @ 23:57)  POCT Blood Glucose.: 117 mg/dL (04-16 @ 17:29)    Skin per nursing documentation: sacrum stage 3 per flow sheets   Edema per nursing documentation: 2+ generalized, 3+ ruth hand     Estimated Needs based on dosing weight 48.1kg   30-35kcal/kg 1443-1683kcal/day  1.3-1.6g/kg 62-77g protein/day  defer fluid needs to team     Previous Nutrition Diagnosis: Increased Nutrient Needs   Nutrition Diagnosis is: [x] ongoing  [] resolved [] not applicable     Nutrition Care Plan:  [x] In Progress  [] Achieved  [] Not applicable    New Nutrition Diagnosis: severe acute malnutrition related to acuteness of illness as evidenced by 2+ edema, meeting </= 50% of EER x >/= 5 days.     Nutrition Interventions:     Education Provided   [] Yes:  [x] No:     Recommendations:      -Currently on trickle feeds s/p G-J tube placement. Defer tube feeding advancement to team. When/if able, can slowly advanced to Goal while strictly monitoring tolerance: Goal is [employer] Peptide 1.5 @ 65mL/hr x 16hrs is reached. This will provide pt with 1040ml, 1602kcal (33kcal/kg) and 77g protein  (1.6g/kg). If unable to advance tube feeding to Goal, may need to consider PN.   -Defer free water flush to team   -Continue Multivitamin and Vitamin C to aid in wound healing  -Continue Alfred to aid in wound healing   -nutrition risk placed in chart     Monitoring and Evaluation:   Continue to monitor nutritional intake, tolerance to diet prescription, weights, labs, skin integrity    RD remains available upon request and will follow up per protocol  Rufina Morris MS, RD, CDN / Teams

## 2024-04-17 NOTE — PROGRESS NOTE ADULT - PROBLEM SELECTOR PLAN 1
- Patient p/w PEG tube dislodgement on admission   - CT A/P 4/2: Dislodged G tube with balloon in the anterior abdominal wall with air filled track to stomach  - S/p bedside exchange by IR on 4/3 ( # 20 Fr Kangaroo EnFit placed )  - 4/4: PEG leaking; IR adjusted PEG at bedside  - 4/5: PEG still leaking, adjusted at bedside by IR Attending.  - 4/8-4/9 IR aborted procedure; GI Dr. Cleaning consulted for new PEG  - 4/9 PEG fell out spontaneously, Mcbride placed into tract   - 4/12: Mcbride replaced with 18Fr PEG at bedside, bumper at 8cm (intentionally loose)  - CT A/P 4/12: G-tube in the stomach/Mild inflammation thickening along tract   - 4/14 18 fr PEG was used, feeds were re-initiated, attempt failed, PEG with copious amounts of leakage  - 4/16 S/p new PEG-J and closure of gastric fistula in endo suite with GI and surgery  - TFs initiated at 20 cc/hr; will advance as tolerated

## 2024-04-17 NOTE — CONSULT NOTE ADULT - ASSESSMENT
Impression:    Sacral/bilateral Buttocks chronic stage 4 pressure injury present on admission  Left ischium chronic stage 4 pressure injury present on admission  Diarrhea  Incontinence of bowel and bladder  Incontinence Dermatitis    Recommend:  1.) topical therapy: sacral/buttock, Left ischial injuries - cleanse with incontinence cleanser, pat dry, apply Triad ointment BID and PRN for incontinent episodes  2.) Incontinence Management - incontinence cleanser, pads, pericare BID  3.) Maintain on an alternating air with low air loss surface  4.) Turn and reposition Q 2 hours  5.) Nutrition optimization - please add Alfred  6.) Offload heels/feet with complete cair air fluidized boots; ensure that the soles of the feet are not resting on the foot board of the bed.    Care as per medicine. Will not actively follow but will remain available. Please recall for new issues or deterioration.  Upon [facility] [address] [place] [address] [phone]  Thank you for this consult  Seen and discussed with clinical nurse  Maggie Walton, PRITI-C, CWOCN via TEAMS

## 2024-04-17 NOTE — PROGRESS NOTE ADULT - PROBLEM SELECTOR PLAN 3
- Was Being treated for C diff as outpatient with follow up with Infectious disease, Dr Newman   - Completed fidaxomicin (total 10 day course) 4/3 -->4/13  - ID recommends ZinPlava 50mg IV to be given to reduce Cdiff infections (is not carried in hospital)  - Continued enteral vanco Taper as follows: as per ID  - Vancomycin 125 mg po 4 times daily 4/8 --> 4/18    Vancomycin 125 mg po 2 times daily 4/19 --> 4/25    Vancomycin 125 mg po once daily 4/26 -->5/2    Vancomycin 125 mg po once every other day 5/3 --> 5/9    Vancomycin 125 mg po once every third day 5/10 --> 5/23/24

## 2024-04-17 NOTE — PROGRESS NOTE ADULT - ASSESSMENT
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  care coordinated with RCU team.   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  care coordinated with RCU team.   plan discussed with her daughter (by phone).

## 2024-04-17 NOTE — PROGRESS NOTE ADULT - PROBLEM SELECTOR PLAN 5
- Continue Lantus 5 units QHS   - Continue ISS   - Monitor BGMs - Patient with Hx of RA on Enbrel as outpatient ( Currently being held)   - Outpatient Rheumatologist Dr. Egan   - Pt w/ sclera icterus c/w Active RA  - Seen by Rheum inpatient; recc outpatient follow up   - Cont prednisone and Hydromorphone prn pain

## 2024-04-17 NOTE — PROGRESS NOTE ADULT - ASSESSMENT
75 yo F PMH T2DM on insulin, HTN, HLD, hypothyroidism, RA on Prednisone, Fibromyalgia, cerebral aneurysm s/p repair, chronic hypercapnic respiratory failure s/p trach (vent dependent) and Chronic PEG 2022, recurrent C. diff infection (follows with Dr. Newman) , bedbound who presented from home with G tube dislodgement and redness around the site. Had CT Abdomen/Pelvis done showing dislodgement of G tube with balloon in the anterior abdominal wall with air filled track to stomach. Admitted to MICU for ventilator management and given vancomycin/meropenem empirically for treatment of abd wall cellulitis. Per family patient has had Known c diff infection for past 2-3 weeks.  and was being Treated with Fidaxomicin.  IR team exchanged PEG on 4/3 however later in the day it remained with leakage.  IR Attending adjusted PEG at bedside on 4/4 and PEG remained with moderate amount of PEG leakage once feeds initiated at reduced rate.  GI team re-consulted as second opinion for PEG management.  On 4/9 PEG was found out of place, a emerson catheter was placed in the stoma to maintain patency.  Patient was planned for PEG revision with both Surgery and GI team involved on 4/11 however patient had fevers up to 102F and procedure was cancelled for infectious work-up.  An 18 Fr PEG tube placed at bedside on 4/12 (original PEG size was 20Fr) as stoma site appears to have closed. Antibiotics were switched from Meropenem to Cefepime, completed 4/14. Patient S/p PEG-J and closure of gastric fistula with GI and surgery in endo suite on 4/16.        4/17: Patient S/p G-J Tube placement and closure of gastric fistula yesterday by GI and Surgery. Patient evaluated at bedside with Surgery  this morning and case d/w  will initiate feeds today @ 20 cc/hr. Heme recommendations appreciated fibrinogen sent           (Results pending), B12 and Folate ordered for the morning.

## 2024-04-17 NOTE — PROGRESS NOTE ADULT - SUBJECTIVE AND OBJECTIVE BOX
afeb    on mechanical vent (12 / 350 / 30% / +5) via 8.0 Shiley XLT distal trach  trach site clean without evidence of infection    soft / NT / ND  gastrocutaneous fistula wound clean with minimal drainage on gauze  PEG-J site clean without evidence of infection  PEG bumper at 4.0 cm from skin      WBC = 17   afeb    on mechanical vent (12 / 350 / 30% / +5) via 8.0 Shiley XLT distal trach  trach site clean without evidence of infection    soft / NT / ND  gastrocutaneous fistula wound clean with minimal drainage on gauze  PEG-J site clean without evidence of infection  PEG bumper at 3.0 cm from skin      WBC = 5

## 2024-04-17 NOTE — PROGRESS NOTE ADULT - ASSESSMENT
75yo woman  h/o T2DM (4/4/24: A1C = 6.2%), HTN, HLD, anemia, hypothyroidism, RA, fibromyalgia, remote cerebral aneurysm repair, acute hypercapnic respiratory failure with tracheostomy, PEG tube (initially placed 2022), and bedbound, recurrent C diff presented for PEG tube dislodgment. Admitted 4/2/24  weight = 54.5 kg    Recurrent C diff - first episode Aug 2023 treated with tapering PO vanco, recurrent episode 1/31/24 treated with PO vanco and then fidaxomicin completed in Feb - most recently tested positive 3/20/24 seen by Dr. Newman 3/29 outpatient, started on fidaxomicin that day - tube feeds concurrently held, unclear if improving afterwards per family  Chronic sacral decubitus wound  3/20 Klebisella bacteruria R only to amp and sputum few Pseudomonas R to FQs w/o polys on gram stian - treatment of urine was deferred to avoid exacerbating C diff    Tmax 100, no leukocytosis  Concern for cellulitis around PEG tube site - area with very minimal erythema, remarkable receded from skin norm placed on admission  UA 11 WBC  CT a/p: no infectious focus or colitis  MRSA PCR+    4/3 IR - PEG exchanged bedside to 20 Fr [employer]  - Bedside XR demonstrates appropriately positioned G-tube with contrast filling into the stomach and bowel.    4/2 Blood Cultures x 2 NGTD;  Positive MRSA/MSSA PCR  4/3 Urine cultures NEG  4/4 fever  4/4 Wound care assessment appreciated:   " area of persistent nonblanchable dark discoloration that is inconsistent with a patient's surrounding skin tone as well as a white juan j film noted over B/L buttocks/sacral skin, area measures approximately 10cm x 10cm x 0cm- presentation is consistent with a deep tissue injury in evolution with incontinence and fungal involvement present on admission. Within the apex of the gluteal cleft/base of sacrum there is full thickness skin loss with red, beefy tissue noted, area measures approximately 3cm x 1cm x 0.3cm- presentation is consistent with a deep tissue injury in evolution with incontinence involvement present on admission."  4/7 fever; Meropenem resumed  Positive Blood Culture x1 Serratia marcesens    4/16 OR: Gastrojejunostomy, percutaneous, endoscopic, Endoscopic assisted closure of PEG site. Endoscopic assisted percutaneous gastrojejunostomy tube placement.      Antibiotics  Meropenem 4/2 -->4/3; 4/7 --> 4/11  Cefepime 4/11 --> 4/14  IV Vanco 4/2  Fdaxomicin 4/3--> 4/8  PO Vanco 4/8 -->     Suggest:  Continue po Vanco  Extended po Vancomycin taper  Bezlotoxumab (Zinplava) at end of Vanco course as out pt    Vanco dosing schedule  Vancomycin 125 mg po 4 times daily 4/8 --> 4/18  Vancomycin 125 mg po 2 times daily 4/19 --> 4/25  Vancomycin 125 mg po once daily 4/26 -->5/2  Vancomycin 125 mg po once every other day 5/3 --> 5/9  Vancomycin 125 mg po once every third day 5/10 --> 5/23/24    Given multiple recurrences in context of advanced age, debility and multiple co-morbidities, administration of Bezlotoxumab (Zinplava) 500 mg over 1 hours is indicated to reduce future recurrences  Ref: Norm Wheeler M.D., Rojas Contreras M.D., Scott Griffith, Ph.D., Shiva Abraham M.D., Gabriel Falcon D.O., Pedro Weiss M.D., Talib Hampton M.D., Sena Hughes M.D., Raimundo Marte M.D., Melissa Soria M.D., Piyush Lamas M.D., Eliceo Romero M.D., Neyda Xie M.D., Jude Peters M.D., Neelima Vela B.S.M.T., Hannah Cardenas, Ph.D., Ana Quiroz, B.S., Obie Chirinos, M.S., Khushbu Flores M.D., Jean Carlos Ayala M.D., and Kristin Lopez, Ph.D., for the MOD[employer] I and MODIFY II Investigators*    Bezlotoxumab is suggested in IDSA guidelines -  Ref: Clinical Practice Guidelines for the Management of Clostridioides difficile Infection in Adults: 2021 Update by SHEA/IDSA  Published VERNA, 6/14/2021; Clinical Infectious Diseases, [id number], [email or web address]    discussed with daughter

## 2024-04-17 NOTE — PROGRESS NOTE ADULT - PROBLEM SELECTOR PLAN 9
- Dc planning to home   - CM working with insurance company and daughter to determine post dc care - Daughter interested in patient having pleasure feeds by mouth.  - Daughter states patient was cleared by outpatient Pulmonologist to have small amounts of liquids orally  - Daughter informed that patient will need swallow evaluation while inpatient to assess aspiration risk   - Previously Explained that a swallow evaluation would further inform her of appropriate texture to decrease risk of aspiration if patient is appropriate. However if patient fails evaluation and is not cleared for an oral diet  - Daughter would be assuming risks of aspiration complications if she chooses to feed patient by mouth  - Once patient is more stable the plan is to proceed with FEESST  - Daughter provided with medical update over the phone today  - Code Status: DNR

## 2024-04-17 NOTE — PROGRESS NOTE ADULT - PROBLEM SELECTOR PLAN 6
- Possibly reactive in setting of antibiotics/active infection  - History of AOCD  - Heme w/u in progress; Iron studies c/w AOCD   - B12 / Folate ( Ordered for AM )   - Fibrinogen ( Sent / Results Pending); Transfuse cryoprecipitate if fibrinogen < 100  - Patient with slightly elevated INR; Vitk 10mg QD X 3 Days ( Ends 4/20)   - Transfuse for hg < 7.0 and platelets < 10k, < 20k if febrile and < 50k if bleeding ( Heme Reccs) - Continue Lantus 5 units QHS   - Continue ISS   - Monitor BGMs

## 2024-04-17 NOTE — PROGRESS NOTE ADULT - SUBJECTIVE AND OBJECTIVE BOX
Patient is a 74y old  Female who presents with a chief complaint of Dislodged G Tube (16 Apr 2024 09:41)      Interval Events: Patient S/p G-J tube placement yesterday, no events reported overnight     REVIEW OF SYSTEMS:  [ ] Positive  [ ] All other systems negative  [ ] Unable to assess ROS because ________    Vital Signs Last 24 Hrs  T(C): 36.8 (04-17-24 @ 06:10), Max: 36.8 (04-16-24 @ 20:00)  T(F): 98.2 (04-17-24 @ 06:10), Max: 98.2 (04-16-24 @ 20:00)  HR: 120 (04-17-24 @ 06:10) (84 - 120)  BP: 136/72 (04-17-24 @ 06:10) (113/69 - 158/70)  RR: 20 (04-17-24 @ 06:10) (12 - 22)  SpO2: 100% (04-17-24 @ 06:10) (98% - 100%)    PHYSICAL EXAM:  HEENT:   [ ]Tracheostomy:  [ ]Pupils equal  [ ]No oral lesions  [ ]Abnormal    SKIN  [ ]No Rash  [ ] Abnormal  [ ] pressure    CARDIAC  [ ]Regular  [ ]Abnormal    PULMONARY  [ ]Bilateral Clear Breath Sounds  [ ]Normal Excursion  [ ]Abnormal    GI  [ ]PEG      [ ] +BS		              [ ]Soft, nondistended, nontender	  [ ]Abnormal    MUSCULOSKELETAL                                   [ ]Bedbound                 [ ]Abnormal    [ ]Ambulatory/OOB to chair                           EXTREMITIES                                         [ ]Normal  [ ]Edema                           NEUROLOGIC  [ ] Normal, non focal  [ ] Focal findings:    PSYCHIATRIC  [ ]Alert and appropriate  [ ] Sedated	 [ ]Agitated    :  Mcbride: [ ] Yes, if yes: Date of Placement:                   [  ] No    LINES: Central Lines [ ] Yes, if yes: Date of Placement                                     [  ] No    HOSPITAL MEDICATIONS:  MEDICATIONS  (STANDING):  albuterol/ipratropium for Nebulization 3 milliLiter(s) Nebulizer every 6 hours  amLODIPine   Tablet 10 milliGRAM(s) Oral <User Schedule>  artificial tears (preservative free) Ophthalmic Solution 1 Drop(s) Both EYES four times a day  ascorbic acid 500 milliGRAM(s) Oral daily  Biotene Dry Mouth Oral Rinse 5 milliLiter(s) Swish and Spit every 6 hours  chlorhexidine 0.12% Liquid 15 milliLiter(s) Oral Mucosa every 12 hours  chlorhexidine 4% Liquid 1 Application(s) Topical <User Schedule>  erythromycin   Ointment 1 Application(s) Both EYES three times a day  escitalopram 10 milliGRAM(s) Oral <User Schedule>  gabapentin Solution 100 milliGRAM(s) Oral <User Schedule>  insulin glargine Injectable (LANTUS) 5 Unit(s) SubCutaneous <User Schedule>  insulin lispro (ADMELOG) corrective regimen sliding scale   SubCutaneous every 6 hours  lactated ringers. 1000 milliLiter(s) (50 mL/Hr) IV Continuous <Continuous>  levothyroxine Injectable 87.5 MICROGram(s) IV Push <User Schedule>  lidocaine   4% Patch 1 Patch Transdermal at bedtime  lidocaine   4% Patch 1 Patch Transdermal at bedtime  melatonin Liquid 3 milliGRAM(s) Oral at bedtime  multivitamin/minerals/iron Oral Solution (CENTRUM) 15 milliLiter(s) Oral daily  pantoprazole  Injectable 40 milliGRAM(s) IV Push every 12 hours  predniSONE  Solution 5 milliGRAM(s) Oral <User Schedule>  QUEtiapine 12.5 milliGRAM(s) Oral <User Schedule>  simethicone 80 milliGRAM(s) Chew every 6 hours  surgical lubricant sterile 1 Application(s) Topical every 8 hours  vancomycin    Solution 125 milliGRAM(s) Oral every 12 hours    MEDICATIONS  (PRN):  calamine/zinc oxide Lotion 1 Application(s) Topical daily PRN Rash and/or Itching  HYDROmorphone  Injectable 1 milliGRAM(s) IV Push every 6 hours PRN Severe Pain (7 - 10)  HYDROmorphone  Injectable 0.5 milliGRAM(s) IV Push every 4 hours PRN Moderate Pain (4 - 6)  ondansetron Injectable 4 milliGRAM(s) IV Push every 8 hours PRN Nausea and/or Vomiting  sodium chloride 0.65% Nasal 1 Spray(s) Both Nostrils every 12 hours PRN Congestion      LABS:                        8.4    5.64  )-----------( 118      ( 17 Apr 2024 06:53 )             29.0     04-17    136  |  103  |  <4<L>  ----------------------------<  129<H>  3.4<L>   |  23  |  <0.30<L>    Ca    8.5      17 Apr 2024 06:57  Phos  2.9     04-17  Mg     1.5     04-17    TPro  5.7<L>  /  Alb  2.3<L>  /  TBili  2.6<H>  /  DBili  x   /  AST  38  /  ALT  26  /  AlkPhos  48  04-17    PT/INR - ( 17 Apr 2024 06:56 )   PT: 15.5 sec;   INR: 1.42 ratio         PTT - ( 17 Apr 2024 06:56 )  PTT:32.1 sec  Urinalysis Basic - ( 17 Apr 2024 06:57 )    Color: x / Appearance: x / SG: x / pH: x  Gluc: 129 mg/dL / Ketone: x  / Bili: x / Urobili: x   Blood: x / Protein: x / Nitrite: x   Leuk Esterase: x / RBC: x / WBC x   Sq Epi: x / Non Sq Epi: x / Bacteria: x          CAPILLARY BLOOD GLUCOSE    MICROBIOLOGY:     RADIOLOGY:  [ ] Reviewed and interpreted by me    Mode: AC/ CMV (Assist Control/ Continuous Mandatory Ventilation)  RR (machine): 12  TV (machine): 350  FiO2: 30  PEEP: 5  ITime: 1  MAP: 10  PIP: 33   Patient is a 74y old  Female who presents with a chief complaint of Dislodged G Tube (16 Apr 2024 09:41)      Interval Events: Patient S/p G-J tube placement yesterday, no events reported overnight     REVIEW OF SYSTEMS:  [x] Positive; C/o body pain   [ ] All other systems negative  [ ] Unable to assess ROS because ________    Vital Signs Last 24 Hrs  T(C): 36.8 (04-17-24 @ 06:10), Max: 36.8 (04-16-24 @ 20:00)  T(F): 98.2 (04-17-24 @ 06:10), Max: 98.2 (04-16-24 @ 20:00)  HR: 120 (04-17-24 @ 06:10) (84 - 120)  BP: 136/72 (04-17-24 @ 06:10) (113/69 - 158/70)  RR: 20 (04-17-24 @ 06:10) (12 - 22)  SpO2: 100% (04-17-24 @ 06:10) (98% - 100%)    PHYSICAL EXAM:  HEENT:   [x]Tracheostomy: # 8 distal XLT cuffed Shiley   [x]Pupils equal  [ ]No oral lesions  [x]Abnormal: Sclera erythema     SKIN  [ ]No Rash  [ ] Abnormal  [x] pressure: Sacral/ Buttock DTI    CARDIAC  [x]Regular  [ ]Abnormal    PULMONARY  [x]Bilateral Coarse Breath Sounds  [ ]Normal Excursion  [ ]Abnormal    GI  [x]+ G-J Tube   [x] +BS		              [x] Soft, No rebound, No guarding 	  [x]Abnormal: Rectal tube in place, Prior PEG Stoma site w/o erythema with clean dry dressing this morning      MUSCULOSKELETAL                                   [x]Bedbound                 [ ]Abnormal    [ ]Ambulatory/OOB to chair                           EXTREMITIES                                         [x]Normal  [ ]Edema                           NEUROLOGIC  [ ] Normal, non focal  [x] Focal findings: awake and alert, follows commands on all extremities    PSYCHIATRIC  [x]Alert and appropriate  [ ] Sedated	 [ ]Agitated    :  Mcbride: [ ] Yes, if yes: Date of Placement:                   [x] No    LINES: Central Lines [ ] Yes, if yes: Date of Placement                                     [x] No      HOSPITAL MEDICATIONS:  MEDICATIONS  (STANDING):  albuterol/ipratropium for Nebulization 3 milliLiter(s) Nebulizer every 6 hours  amLODIPine   Tablet 10 milliGRAM(s) Oral <User Schedule>  artificial tears (preservative free) Ophthalmic Solution 1 Drop(s) Both EYES four times a day  ascorbic acid 500 milliGRAM(s) Oral daily  Biotene Dry Mouth Oral Rinse 5 milliLiter(s) Swish and Spit every 6 hours  chlorhexidine 0.12% Liquid 15 milliLiter(s) Oral Mucosa every 12 hours  chlorhexidine 4% Liquid 1 Application(s) Topical <User Schedule>  erythromycin   Ointment 1 Application(s) Both EYES three times a day  escitalopram 10 milliGRAM(s) Oral <User Schedule>  gabapentin Solution 100 milliGRAM(s) Oral <User Schedule>  insulin glargine Injectable (LANTUS) 5 Unit(s) SubCutaneous <User Schedule>  insulin lispro (ADMELOG) corrective regimen sliding scale   SubCutaneous every 6 hours  lactated ringers. 1000 milliLiter(s) (50 mL/Hr) IV Continuous <Continuous>  levothyroxine Injectable 87.5 MICROGram(s) IV Push <User Schedule>  lidocaine   4% Patch 1 Patch Transdermal at bedtime  lidocaine   4% Patch 1 Patch Transdermal at bedtime  melatonin Liquid 3 milliGRAM(s) Oral at bedtime  multivitamin/minerals/iron Oral Solution (CENTRUM) 15 milliLiter(s) Oral daily  pantoprazole  Injectable 40 milliGRAM(s) IV Push every 12 hours  predniSONE  Solution 5 milliGRAM(s) Oral <User Schedule>  QUEtiapine 12.5 milliGRAM(s) Oral <User Schedule>  simethicone 80 milliGRAM(s) Chew every 6 hours  surgical lubricant sterile 1 Application(s) Topical every 8 hours  vancomycin    Solution 125 milliGRAM(s) Oral every 12 hours    MEDICATIONS  (PRN):  calamine/zinc oxide Lotion 1 Application(s) Topical daily PRN Rash and/or Itching  HYDROmorphone  Injectable 1 milliGRAM(s) IV Push every 6 hours PRN Severe Pain (7 - 10)  HYDROmorphone  Injectable 0.5 milliGRAM(s) IV Push every 4 hours PRN Moderate Pain (4 - 6)  ondansetron Injectable 4 milliGRAM(s) IV Push every 8 hours PRN Nausea and/or Vomiting  sodium chloride 0.65% Nasal 1 Spray(s) Both Nostrils every 12 hours PRN Congestion      LABS:                        8.4    5.64  )-----------( 118      ( 17 Apr 2024 06:53 )             29.0     04-17    136  |  103  |  <4<L>  ----------------------------<  129<H>  3.4<L>   |  23  |  <0.30<L>    Ca    8.5      17 Apr 2024 06:57  Phos  2.9     04-17  Mg     1.5     04-17    TPro  5.7<L>  /  Alb  2.3<L>  /  TBili  2.6<H>  /  DBili  x   /  AST  38  /  ALT  26  /  AlkPhos  48  04-17    PT/INR - ( 17 Apr 2024 06:56 )   PT: 15.5 sec;   INR: 1.42 ratio         PTT - ( 17 Apr 2024 06:56 )  PTT:32.1 sec  Urinalysis Basic - ( 17 Apr 2024 06:57 )    Color: x / Appearance: x / SG: x / pH: x  Gluc: 129 mg/dL / Ketone: x  / Bili: x / Urobili: x   Blood: x / Protein: x / Nitrite: x   Leuk Esterase: x / RBC: x / WBC x   Sq Epi: x / Non Sq Epi: x / Bacteria: x          CAPILLARY BLOOD GLUCOSE    MICROBIOLOGY:     RADIOLOGY:  [ ] Reviewed and interpreted by me    Mode: AC/ CMV (Assist Control/ Continuous Mandatory Ventilation)  RR (machine): 12  TV (machine): 350  FiO2: 30  PEEP: 5  ITime: 1  MAP: 10  PIP: 33

## 2024-04-17 NOTE — PROGRESS NOTE ADULT - ASSESSMENT
#Gastrostomy malfunction/buried bumper  s/p PEG-J placement 4/16  daily wound care   feeds thru j-tube   vent g-tube as needed   surgery care appreciated     #Recurrent C.diff   per ID recs  would not keep rectal tube in too long given risk for causing rectal ulcer

## 2024-04-17 NOTE — PROGRESS NOTE ADULT - PROBLEM SELECTOR PLAN 8
- Daughter interested in patient having pleasure feeds by mouth.  - Daughter states patient was cleared by outpatient Pulmonologist to have small amounts of liquids orally  - Daughter informed that patient will need swallow evaluation while inpatient to assess aspiration risk   - Previously Explained that a swallow evaluation would further inform her of appropriate texture to decrease risk of aspiration if patient is appropriate. However if patient fails evaluation and is not cleared for an oral diet  - Daughter would be assuming risks of aspiration complications if she chooses to feed patient by mouth  - Once patient is more stable the plan is to proceed with FEESST  - Daughter provided with medical update over the phone today  - Code Status: DNR - Sacral wound present on admission   - Wound Care team continues to follow  - Frequent turning and repositioning

## 2024-04-17 NOTE — PROGRESS NOTE ADULT - PROBLEM SELECTOR PLAN 2
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

## 2024-04-17 NOTE — CONSULT NOTE ADULT - SUBJECTIVE AND OBJECTIVE BOX
Wound Surgery Consult Note:    HPI:  75 yo F PMH T2DM on insulin, HTN, HLD, hypothyroidism, RA on Prednisone, Fibromyalgia, cerebral aneurysm s/p repair, acute hypercapnic respiratory failure s/p trach (vent dependent) and PEG (10/22), bedbound presented from home with complaints of possible G tube dislodgement and redness around the site.  Site is red with small amount of pus at insertion site.  Tender to palpation, warm to touch. CT Abdomen/Pelvis done showing dislodgement of G tube with balloon in the anterior abdominal wall with air filled track to stomach.  GI contacted, but unable to replace at bedside given placement of balloon. Surgery consulted--recommend gastrograffin study to eval placement of PEG and possible IR replacement in AM. Given Vancomycin 1 gm IV x 1 due to concern for possible cellulitis.  Of note, patient hemodynamically stable, afebrile, without leukocytosis on presentation.     Per patient's son at bedside, patient currently being treated for C-diff.  Known infection for past 2-3 weeks.  Treated by Dr. Zion Newman, currently receiving Fidaxomicin.  Also report recent UTI--not currently being treated.    Admitted to MICU for ventilator support and treatment of dislodged PEG/abdominal wall cellulitis.  (02 Apr 2024 23:07)    Request for wound care consult for sacral/bilateral buttocks skin injury received from nursing. Ms. Woods was encountered on an alternating air with low air loss surface. She was seen with clinical nurse. She is incontinent of stool and urine at this time and recent diarrhea being managed with a fecal containment device. Darkened skin noted on sacrum/bilateral buttocks and perianal skin noted with two areas of contracted, scar tissue. The contraction and scar tissue are consistent with a chronic, healing, deep pressure injuries over the sacral/bilateral buttocks and left ischium. Her extreme immobility, inactivity, incontinence of stool and urine and poor nutritional status all contribute to her high risk for pressure injury development and hinder healing.      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  H/O tracheostomy  PEG (percutaneous endoscopic gastrostomy) status    REVIEW OF SYSTEMS  unable to obtain due to patient's mental status    MEDICATIONS  (STANDING):  albuterol/ipratropium for Nebulization 3 milliLiter(s) Nebulizer every 6 hours  amLODIPine   Tablet 10 milliGRAM(s) Oral <User Schedule>  artificial tears (preservative free) Ophthalmic Solution 1 Drop(s) Both EYES four times a day  ascorbic acid 500 milliGRAM(s) Oral daily  Biotene Dry Mouth Oral Rinse 5 milliLiter(s) Swish and Spit every 6 hours  chlorhexidine 0.12% Liquid 15 milliLiter(s) Oral Mucosa every 12 hours  chlorhexidine 4% Liquid 1 Application(s) Topical <User Schedule>  erythromycin   Ointment 1 Application(s) Both EYES three times a day  escitalopram 10 milliGRAM(s) Oral <User Schedule>  gabapentin Solution 100 milliGRAM(s) Oral <User Schedule>  insulin glargine Injectable (LANTUS) 5 Unit(s) SubCutaneous <User Schedule>  insulin lispro (ADMELOG) corrective regimen sliding scale   SubCutaneous every 6 hours  levothyroxine Injectable 87.5 MICROGram(s) IV Push <User Schedule>  lidocaine   4% Patch 1 Patch Transdermal at bedtime  lidocaine   4% Patch 1 Patch Transdermal at bedtime  melatonin Liquid 3 milliGRAM(s) Oral at bedtime  multivitamin/minerals/iron Oral Solution (CENTRUM) 15 milliLiter(s) Oral daily  pantoprazole  Injectable 40 milliGRAM(s) IV Push every 12 hours  phytonadione   Solution 10 milliGRAM(s) Oral daily  predniSONE  Solution 5 milliGRAM(s) Oral <User Schedule>  QUEtiapine 12.5 milliGRAM(s) Oral <User Schedule>  simethicone 80 milliGRAM(s) Chew every 6 hours  surgical lubricant sterile 1 Application(s) Topical every 8 hours  vancomycin    Solution 125 milliGRAM(s) Oral every 12 hours    MEDICATIONS  (PRN):  acetaminophen   Oral Liquid .. 650 milliGRAM(s) Oral every 6 hours PRN Mild Pain (1 - 3)  calamine/zinc oxide Lotion 1 Application(s) Topical daily PRN Rash and/or Itching  HYDROmorphone  Injectable 1 milliGRAM(s) IV Push every 6 hours PRN Severe Pain (7 - 10)  HYDROmorphone  Injectable 0.5 milliGRAM(s) IV Push every 4 hours PRN Moderate Pain (4 - 6)  ondansetron Injectable 4 milliGRAM(s) IV Push every 8 hours PRN Nausea and/or Vomiting  sodium chloride 0.65% Nasal 1 Spray(s) Both Nostrils every 12 hours PRN Congestion    Allergies    pineapple (Unknown)  Tagamet (Unknown)  heparin (Unknown)  walnut (Unknown)  metronidazole (Rash)  penicillin (Unknown)  Lyrica (Unknown)  meropenem (Rash)  Pecan, Filbert, Hazelnut (Unknown)    Intolerances    SOCIAL HISTORY:  unable to obtain due to patient's mental status    FAMILY HISTORY:  unable to obtain due to patient's mental status    Vital Signs Last 24 Hrs  T(C): 37.3 (17 Apr 2024 12:30), Max: 37.3 (17 Apr 2024 12:30)  T(F): 99.2 (17 Apr 2024 12:30), Max: 99.2 (17 Apr 2024 12:30)  HR: 85 (17 Apr 2024 15:45) (85 - 120)  BP: 128/72 (17 Apr 2024 12:30) (113/69 - 153/74)  BP(mean): --  RR: 20 (17 Apr 2024 12:30) (18 - 20)  SpO2: 100% (17 Apr 2024 15:45) (98% - 100%)    Parameters below as of 17 Apr 2024 12:30  Patient On (Oxygen Delivery Method): ventilator    Physical Exam:  General: alert  Ophthamology: sclera clear  ENMT: moist mucous membranes, trachea midline, trach  Respiratory: equal chest rise with respirations, vented  Gastrointestinal: soft NT/ND  Neurology: nonverbal, not following commands  Psych: calm, cooperative  Musculoskeletal: no contractures  Vascular: BLE edema equal  Skin:  Sacral/bilateral buttocks with contracted, scarred tissue in and around the gluteal cleft with surrounding dark maroon/purple discoloration L 10cm x W 10cm x D 0.1cm, central cleft with linear superficially denuded skin L 3cm X W 0.2cm x D none, scant serosanguinous drainage  Left ischium with contracted, scarred tissue very close to the anus with central superficially denuded tissue, L 2cmx  w 2cm x D 0.2cm, scant serosanguinous drainage  No odor, erythema, increased warmth, tenderness, induration, fluctuance    LABS:  04-17    136  |  103  |  <4<L>  ----------------------------<  129<H>  3.4<L>   |  23  |  <0.30<L>    Ca    8.5      17 Apr 2024 06:57  Phos  2.9     04-17  Mg     1.5     04-17    TPro  5.7<L>  /  Alb  2.3<L>  /  TBili  2.6<H>  /  DBili  x   /  AST  38  /  ALT  26  /  AlkPhos  48  04-17                          8.4    5.64  )-----------( 118      ( 17 Apr 2024 06:53 )             29.0     PT/INR - ( 17 Apr 2024 06:56 )   PT: 15.5 sec;   INR: 1.42 ratio         PTT - ( 17 Apr 2024 06:56 )  PTT:32.1 sec  Urinalysis Basic - ( 17 Apr 2024 06:57 )    Color: x / Appearance: x / SG: x / pH: x  Gluc: 129 mg/dL / Ketone: x  / Bili: x / Urobili: x   Blood: x / Protein: x / Nitrite: x   Leuk Esterase: x / RBC: x / WBC x   Sq Epi: x / Non Sq Epi: x / Bacteria: x    Angela Anthony PA-C S [id number]

## 2024-04-17 NOTE — PROGRESS NOTE ADULT - SUBJECTIVE AND OBJECTIVE BOX
INTERVAL HPI/OVERNIGHT EVENTS:    aide bedside   peg-j site looks good   feeds running at 20cc/hr    MEDICATIONS  (STANDING):  albuterol/ipratropium for Nebulization 3 milliLiter(s) Nebulizer every 6 hours  amLODIPine   Tablet 10 milliGRAM(s) Oral <User Schedule>  artificial tears (preservative free) Ophthalmic Solution 1 Drop(s) Both EYES four times a day  ascorbic acid 500 milliGRAM(s) Oral daily  Biotene Dry Mouth Oral Rinse 5 milliLiter(s) Swish and Spit every 6 hours  chlorhexidine 0.12% Liquid 15 milliLiter(s) Oral Mucosa every 12 hours  chlorhexidine 4% Liquid 1 Application(s) Topical <User Schedule>  erythromycin   Ointment 1 Application(s) Both EYES three times a day  escitalopram 10 milliGRAM(s) Oral <User Schedule>  gabapentin Solution 100 milliGRAM(s) Oral <User Schedule>  insulin glargine Injectable (LANTUS) 5 Unit(s) SubCutaneous <User Schedule>  insulin lispro (ADMELOG) corrective regimen sliding scale   SubCutaneous every 6 hours  levothyroxine Injectable 87.5 MICROGram(s) IV Push <User Schedule>  lidocaine   4% Patch 1 Patch Transdermal at bedtime  lidocaine   4% Patch 1 Patch Transdermal at bedtime  magnesium sulfate  IVPB 1 Gram(s) IV Intermittent once  melatonin Liquid 3 milliGRAM(s) Oral at bedtime  multivitamin/minerals/iron Oral Solution (CENTRUM) 15 milliLiter(s) Oral daily  pantoprazole  Injectable 40 milliGRAM(s) IV Push every 12 hours  phytonadione   Solution 10 milliGRAM(s) Oral daily  predniSONE  Solution 5 milliGRAM(s) Oral <User Schedule>  QUEtiapine 12.5 milliGRAM(s) Oral <User Schedule>  simethicone 80 milliGRAM(s) Chew every 6 hours  surgical lubricant sterile 1 Application(s) Topical every 8 hours  vancomycin    Solution 125 milliGRAM(s) Oral every 12 hours    MEDICATIONS  (PRN):  calamine/zinc oxide Lotion 1 Application(s) Topical daily PRN Rash and/or Itching  HYDROmorphone  Injectable 1 milliGRAM(s) IV Push every 6 hours PRN Severe Pain (7 - 10)  HYDROmorphone  Injectable 0.5 milliGRAM(s) IV Push every 4 hours PRN Moderate Pain (4 - 6)  ondansetron Injectable 4 milliGRAM(s) IV Push every 8 hours PRN Nausea and/or Vomiting  sodium chloride 0.65% Nasal 1 Spray(s) Both Nostrils every 12 hours PRN Congestion      Allergies    pineapple (Unknown)  Tagamet (Unknown)  heparin (Unknown)  walnut (Unknown)  metronidazole (Rash)  penicillin (Unknown)  Lyrica (Unknown)  meropenem (Rash)  Pecan, Filbert, Hazelnut (Unknown)    Intolerances        Review of Systems:    General:  No wt loss, fevers, chills, night sweats, fatigue   Eyes:  Good vision, no reported pain  ENT:  No sore throat, pain, runny nose, dysphagia  CV:  No pain, palpitations, hypo/hypertension  Resp:  No dyspnea, cough, tachypnea, wheezing  GI:  No pain, No nausea, No vomiting, No diarrhea, No constipation, No weight loss, No fever, No pruritis, No rectal bleeding, No melena, No dysphagia  :  No pain, bleeding, incontinence, nocturia  Muscle:  No pain, weakness  Neuro:  No weakness, tingling, memory problems  Psych:  No fatigue, insomnia, mood problems, depression  Endocrine:  No polyuria, polydypsia, cold/heat intolerance  Heme:  No petechiae, ecchymosis, easy bruisability  Skin:  No rash, tattoos, scars, edema      Vital Signs Last 24 Hrs  T(C): 36.8 (17 Apr 2024 06:10), Max: 36.8 (16 Apr 2024 20:00)  T(F): 98.2 (17 Apr 2024 06:10), Max: 98.2 (16 Apr 2024 20:00)  HR: 90 (17 Apr 2024 11:20) (85 - 120)  BP: 136/72 (17 Apr 2024 06:10) (113/69 - 158/70)  BP(mean): --  RR: 20 (17 Apr 2024 06:10) (12 - 22)  SpO2: 100% (17 Apr 2024 11:20) (98% - 100%)    Parameters below as of 17 Apr 2024 06:10  Patient On (Oxygen Delivery Method): ventilator        PHYSICAL EXAM:    Constitutional: NAD  HEENT: EOMI, throat clear  Neck: No LAD, supple  Respiratory: CTA and P  Cardiovascular: S1 and S2, RRR, no M  Gastrointestinal: BS+, soft, NT/ND, neg HSM,  Extremities: No peripheral edema, neg clubbing, cyanosis  Vascular: 2+ peripheral pulses  Neurological: A/O   Psychiatric: Normal mood, normal affect  Skin: No rashes      LABS:                        8.4    5.64  )-----------( 118      ( 17 Apr 2024 06:53 )             29.0     04-17    136  |  103  |  <4<L>  ----------------------------<  129<H>  3.4<L>   |  23  |  <0.30<L>    Ca    8.5      17 Apr 2024 06:57  Phos  2.9     04-17  Mg     1.5     04-17    TPro  5.7<L>  /  Alb  2.3<L>  /  TBili  2.6<H>  /  DBili  x   /  AST  38  /  ALT  26  /  AlkPhos  48  04-17    PT/INR - ( 17 Apr 2024 06:56 )   PT: 15.5 sec;   INR: 1.42 ratio         PTT - ( 17 Apr 2024 06:56 )  PTT:32.1 sec  Urinalysis Basic - ( 17 Apr 2024 06:57 )    Color: x / Appearance: x / SG: x / pH: x  Gluc: 129 mg/dL / Ketone: x  / Bili: x / Urobili: x   Blood: x / Protein: x / Nitrite: x   Leuk Esterase: x / RBC: x / WBC x   Sq Epi: x / Non Sq Epi: x / Bacteria: x        RADIOLOGY & ADDITIONAL TESTS:

## 2024-04-17 NOTE — PROGRESS NOTE ADULT - NS ATTEND AMEND GEN_ALL_CORE FT
73 y/o F w/chronic respiratory failure with hypoxia and hypercapnia s/p trach/PEG (vent dependent), RA on prednisone, DM, cerebral aneurysm s/p repair, and recurrent C. Diff infection admitted for dislodged PEG tube. Now s/p replacement of G-J tube.    - Continue mechanical ventilation  - Abx as per ID  - DVT prophylaxis  - DNR/DNI  - Dispo planning

## 2024-04-17 NOTE — PROGRESS NOTE ADULT - SUBJECTIVE AND OBJECTIVE BOX
Follow Up:  recurrent cdiff    Interval History/ROS:  rouses to light touch    Allergies  pineapple (Unknown)  Tagamet (Unknown)  heparin (Unknown)  walnut (Unknown)  metronidazole (Rash)  penicillin (Unknown)  Lyrica (Unknown)  meropenem (Rash)  Pecan, Tkbert, Hazelnut (Unknown)    ANTIMICROBIALS:  vancomycin    Solution 125 every 6 hours      OTHER MEDS:  MEDICATIONS  (STANDING):  acetaminophen   Oral Liquid .. 650 every 6 hours PRN  albuterol/ipratropium for Nebulization 3 every 6 hours  amLODIPine   Tablet 10 <User Schedule>  escitalopram 10 <User Schedule>  gabapentin Solution 100 <User Schedule>  HYDROmorphone  Injectable 1 every 6 hours PRN  HYDROmorphone  Injectable 0.5 every 4 hours PRN  insulin glargine Injectable (LANTUS) 5 <User Schedule>  insulin lispro (ADMELOG) corrective regimen sliding scale  every 6 hours  levothyroxine Injectable 87.5 <User Schedule>  melatonin Liquid 3 at bedtime  ondansetron Injectable 4 every 8 hours PRN  pantoprazole  Injectable 40 every 12 hours  predniSONE  Solution 5 <User Schedule>  QUEtiapine 12.5 <User Schedule>  simethicone 80 every 6 hours      Vital Signs Last 24 Hrs  T(C): 36.9 (17 Apr 2024 18:00), Max: 37.3 (17 Apr 2024 12:30)  T(F): 98.5 (17 Apr 2024 18:00), Max: 99.2 (17 Apr 2024 12:30)  HR: 82 (17 Apr 2024 18:00) (82 - 120)  BP: 117/70 (17 Apr 2024 18:00) (113/69 - 153/74)  BP(mean): --  RR: 20 (17 Apr 2024 18:00) (18 - 20)  SpO2: 100% (17 Apr 2024 18:00) (98% - 100%)    Parameters below as of 17 Apr 2024 18:00  Patient On (Oxygen Delivery Method): ventilator        PHYSICAL EXAM:  General: NAD, Non-toxic  Neurology: rousable  Respiratory: Clear to auscultation bilaterally  CV: RRR, S1S2, no murmurs, rubs or gallops  Abdominal: Soft, Non-tender, distended, new PEG site medial of previous site  Extremities: genrealized edema,   Line Sites: Clear  Skin: No rash                          8.4    5.64  )-----------( 118      ( 17 Apr 2024 06:53 )             29.0       04-17    136  |  103  |  <4<L>  ----------------------------<  129<H>  3.4<L>   |  23  |  <0.30<L>    Ca    8.5      17 Apr 2024 06:57  Phos  2.9     04-17  Mg     1.5     04-17    TPro  5.7<L>  /  Alb  2.3<L>  /  TBili  2.6<H>  /  DBili  x   /  AST  38  /  ALT  26  /  AlkPhos  48  04-17      MICROBIOLOGY:  .Blood Blood-Peripheral  04-15-24   No growth at 48 Hours  --  --      .Blood Blood-Peripheral  04-15-24   No growth at 48 Hours  --  --      Catheterized Catheterized  04-12-24   No growth  --  --      .Blood Blood-Peripheral  04-12-24   No growth at 5 days  --  --      .Blood Blood-Peripheral  04-11-24   No growth at 5 days  --  --      .Blood Blood-Peripheral  04-09-24   No growth at 5 days  --  --      .Sputum Sputum  04-07-24   Mixed gram negative rods Culture includes  Moderate Stenotrophomonas maltophilia  Few Pseudomonas aeruginosa (Carbapenem Resistant)  --  Stenotrophomonas maltophilia  Pseudomonas aeruginosa (Carbapenem Resistant)      .Blood Blood-Peripheral  04-07-24   No growth at 5 days  --  Blood Culture PCR  Serratia marcescens      .Sputum Sputum  04-05-24   Numerous Pseudomonas aeruginosa  Normal Respiratory Shanda present  --  Pseudomonas aeruginosa      .Blood Blood-Peripheral  04-04-24   No growth at 5 days  --  --      Clean Catch Clean Catch (Midstream)  04-03-24   <10,000 CFU/mL Normal Urogenital Shanda  --  --      .Blood Blood-Peripheral  04-02-24   No growth at 5 days  --  --    RADIOLOGY:  < from: CT Abdomen and Pelvis w/ Oral Cont and w/ IV Cont (04.12.24 @ 21:36) >  CHEST:  -Multifocal pneumonia as described above, superimposed on prior findings   of atelectasis. Follow-up to resolution with repeat chest CT in one   month, or sooner as clinically warranted.  -Tracheostomy terminates at the nick, similar to prior imaging. Central   and distal airway secretions and resultant mosaic attenuation of the   aerated lung parenchyma, likely from air trapping.    ABDOMEN/PELVIS:  -Colon is overall nondistended, limiting evaluation. Scattered mild   apparent colonic wall thickening is suggested. Correlate for any signs   and symptoms of persistent colitis.  -Right anterior kidney faint geographic region of low attenuation on   image 138 series 3 is similar to 4/2/2024 CT, of unclear significance.   Correlate for any signs and symptoms of renal infection.  -Gastrostomy tube localized to the stomach. Mild inflammation/thickening   along gastrostomy tube tract is similar to prior imaging. Correlate   clinically.  -Sacrum decubitus ulcer redemonstrated, with increasedskin thickening in   the interim. Correlate clinically.    < end of copied text >      Zion Newman MD; Division of Infectious Disease; Pager: [phone]; nights and weekends: [phone]

## 2024-04-17 NOTE — PROGRESS NOTE ADULT - PROBLEM SELECTOR PLAN 7
- Sacral wound present on admission   - Wound Care team continues to follow  - Frequent turning and repositioning - Possibly reactive in setting of antibiotics/active infection  - History of AOCD  - Heme w/u in progress; Iron studies c/w AOCD   - B12 / Folate ( Ordered for AM )   - Fibrinogen ( Sent / Results Pending); Transfuse cryoprecipitate if fibrinogen < 100  - Patient with slightly elevated INR; Vitk 10mg QD X 3 Days ( Ends 4/20)   - Transfuse for hg < 7.0 and platelets < 10k, < 20k if febrile and < 50k if bleeding ( Heme Reccs)

## 2024-04-17 NOTE — PROGRESS NOTE ADULT - PROBLEM SELECTOR PLAN 4
- IV Vanco/meropenem x1 on admission for concern for cellulitis  - 4/2 blood cultures negative x2, MRSA/MSSA PCR +  - 4/3 urine culture nl vinay, CT A/P 4/2: no infectious focus or colitis  - 4/5 Procal 6.6 increased from 0.08 on 4/3, lactate 4.3  - 4/7 Fever, meropenem restarted - no leukocytosis - RVP neg, UA neg, dopplers neg  - 4/7 blood cultures positive for serratia - Meropenem 4/2-4/3 and 4/7-4/11, switched to cefepime 4/11-4/14  - Currently remains Afebrile w/o Leukocytosis

## 2024-04-18 LAB
ALBUMIN SERPL ELPH-MCNC: 2.4 G/DL — LOW (ref 3.3–5)
ALP SERPL-CCNC: 52 U/L — SIGNIFICANT CHANGE UP (ref 40–120)
ALT FLD-CCNC: 32 U/L — SIGNIFICANT CHANGE UP (ref 10–45)
ANION GAP SERPL CALC-SCNC: 10 MMOL/L — SIGNIFICANT CHANGE UP (ref 5–17)
APTT BLD: 31.9 SEC — SIGNIFICANT CHANGE UP (ref 24.5–35.6)
AST SERPL-CCNC: 52 U/L — HIGH (ref 10–40)
BASOPHILS # BLD AUTO: 0.02 K/UL — SIGNIFICANT CHANGE UP (ref 0–0.2)
BASOPHILS NFR BLD AUTO: 0.4 % — SIGNIFICANT CHANGE UP (ref 0–2)
BILIRUB SERPL-MCNC: 2.5 MG/DL — HIGH (ref 0.2–1.2)
BUN SERPL-MCNC: 5 MG/DL — LOW (ref 7–23)
CALCIUM SERPL-MCNC: 9 MG/DL — SIGNIFICANT CHANGE UP (ref 8.4–10.5)
CHLORIDE SERPL-SCNC: 104 MMOL/L — SIGNIFICANT CHANGE UP (ref 96–108)
CO2 SERPL-SCNC: 26 MMOL/L — SIGNIFICANT CHANGE UP (ref 22–31)
CREAT SERPL-MCNC: <0.3 MG/DL — LOW (ref 0.5–1.3)
EGFR: 113 ML/MIN/1.73M2 — SIGNIFICANT CHANGE UP
EOSINOPHIL # BLD AUTO: 0.02 K/UL — SIGNIFICANT CHANGE UP (ref 0–0.5)
EOSINOPHIL NFR BLD AUTO: 0.4 % — SIGNIFICANT CHANGE UP (ref 0–6)
FOLATE SERPL-MCNC: 14.8 NG/ML — SIGNIFICANT CHANGE UP
GLUCOSE BLDC GLUCOMTR-MCNC: 147 MG/DL — HIGH (ref 70–99)
GLUCOSE BLDC GLUCOMTR-MCNC: 181 MG/DL — HIGH (ref 70–99)
GLUCOSE BLDC GLUCOMTR-MCNC: 244 MG/DL — HIGH (ref 70–99)
GLUCOSE SERPL-MCNC: 142 MG/DL — HIGH (ref 70–99)
HCT VFR BLD CALC: 29.5 % — LOW (ref 34.5–45)
HGB BLD-MCNC: 8.9 G/DL — LOW (ref 11.5–15.5)
IMM GRANULOCYTES NFR BLD AUTO: 2.7 % — HIGH (ref 0–0.9)
INR BLD: 1.48 RATIO — HIGH (ref 0.85–1.18)
LYMPHOCYTES # BLD AUTO: 1.42 K/UL — SIGNIFICANT CHANGE UP (ref 1–3.3)
LYMPHOCYTES # BLD AUTO: 25.6 % — SIGNIFICANT CHANGE UP (ref 13–44)
MAGNESIUM SERPL-MCNC: 1.8 MG/DL — SIGNIFICANT CHANGE UP (ref 1.6–2.6)
MCHC RBC-ENTMCNC: 26.7 PG — LOW (ref 27–34)
MCHC RBC-ENTMCNC: 30.2 GM/DL — LOW (ref 32–36)
MCV RBC AUTO: 88.6 FL — SIGNIFICANT CHANGE UP (ref 80–100)
MONOCYTES # BLD AUTO: 0.47 K/UL — SIGNIFICANT CHANGE UP (ref 0–0.9)
MONOCYTES NFR BLD AUTO: 8.5 % — SIGNIFICANT CHANGE UP (ref 2–14)
NEUTROPHILS # BLD AUTO: 3.46 K/UL — SIGNIFICANT CHANGE UP (ref 1.8–7.4)
NEUTROPHILS NFR BLD AUTO: 62.4 % — SIGNIFICANT CHANGE UP (ref 43–77)
NRBC # BLD: 0 /100 WBCS — SIGNIFICANT CHANGE UP (ref 0–0)
PHOSPHATE SERPL-MCNC: 2.6 MG/DL — SIGNIFICANT CHANGE UP (ref 2.5–4.5)
PLATELET # BLD AUTO: 101 K/UL — LOW (ref 150–400)
POTASSIUM SERPL-MCNC: 3.1 MMOL/L — LOW (ref 3.5–5.3)
POTASSIUM SERPL-SCNC: 3.1 MMOL/L — LOW (ref 3.5–5.3)
PROT SERPL-MCNC: 5.8 G/DL — LOW (ref 6–8.3)
PROTHROM AB SERPL-ACNC: 15.4 SEC — HIGH (ref 9.5–13)
RBC # BLD: 3.33 M/UL — LOW (ref 3.8–5.2)
RBC # FLD: 20.9 % — HIGH (ref 10.3–14.5)
SODIUM SERPL-SCNC: 140 MMOL/L — SIGNIFICANT CHANGE UP (ref 135–145)
VIT B12 SERPL-MCNC: >2000 PG/ML — HIGH (ref 232–1245)
WBC # BLD: 5.54 K/UL — SIGNIFICANT CHANGE UP (ref 3.8–10.5)
WBC # FLD AUTO: 5.54 K/UL — SIGNIFICANT CHANGE UP (ref 3.8–10.5)

## 2024-04-18 PROCEDURE — 99232 SBSQ HOSP IP/OBS MODERATE 35: CPT

## 2024-04-18 PROCEDURE — 99024 POSTOP FOLLOW-UP VISIT: CPT

## 2024-04-18 RX ORDER — ZINC SULFATE TAB 220 MG (50 MG ZINC EQUIVALENT) 220 (50 ZN) MG
220 TAB ORAL
Refills: 0 | Status: COMPLETED | OUTPATIENT
Start: 2024-04-18 | End: 2024-04-25

## 2024-04-18 RX ORDER — POTASSIUM CHLORIDE 20 MEQ
40 PACKET (EA) ORAL EVERY 4 HOURS
Refills: 0 | Status: COMPLETED | OUTPATIENT
Start: 2024-04-18 | End: 2024-04-18

## 2024-04-18 RX ORDER — HYDROMORPHONE HYDROCHLORIDE 2 MG/ML
1 INJECTION INTRAMUSCULAR; INTRAVENOUS; SUBCUTANEOUS EVERY 6 HOURS
Refills: 0 | Status: DISCONTINUED | OUTPATIENT
Start: 2024-04-18 | End: 2024-04-20

## 2024-04-18 RX ORDER — CHOLECALCIFEROL (VITAMIN D3) 125 MCG
2000 CAPSULE ORAL DAILY
Refills: 0 | Status: DISCONTINUED | OUTPATIENT
Start: 2024-04-18 | End: 2024-05-01

## 2024-04-18 RX ORDER — PANTOPRAZOLE SODIUM 20 MG/1
40 TABLET, DELAYED RELEASE ORAL DAILY
Refills: 0 | Status: DISCONTINUED | OUTPATIENT
Start: 2024-04-19 | End: 2024-04-28

## 2024-04-18 RX ORDER — KETOROLAC TROMETHAMINE 30 MG/ML
15 SYRINGE (ML) INJECTION ONCE
Refills: 0 | Status: DISCONTINUED | OUTPATIENT
Start: 2024-04-18 | End: 2024-04-18

## 2024-04-18 RX ORDER — HYDROMORPHONE HYDROCHLORIDE 2 MG/ML
0.5 INJECTION INTRAMUSCULAR; INTRAVENOUS; SUBCUTANEOUS EVERY 4 HOURS
Refills: 0 | Status: DISCONTINUED | OUTPATIENT
Start: 2024-04-18 | End: 2024-04-20

## 2024-04-18 RX ADMIN — Medication 125 MILLIGRAM(S): at 16:01

## 2024-04-18 RX ADMIN — GABAPENTIN 100 MILLIGRAM(S): 400 CAPSULE ORAL at 09:25

## 2024-04-18 RX ADMIN — Medication 500 MILLIGRAM(S): at 11:17

## 2024-04-18 RX ADMIN — HYDROMORPHONE HYDROCHLORIDE 1 MILLIGRAM(S): 2 INJECTION INTRAMUSCULAR; INTRAVENOUS; SUBCUTANEOUS at 03:29

## 2024-04-18 RX ADMIN — AMLODIPINE BESYLATE 10 MILLIGRAM(S): 2.5 TABLET ORAL at 09:25

## 2024-04-18 RX ADMIN — Medication 1 DROP(S): at 17:57

## 2024-04-18 RX ADMIN — LIDOCAINE 1 PATCH: 4 CREAM TOPICAL at 22:45

## 2024-04-18 RX ADMIN — Medication 5 MILLIGRAM(S): at 09:26

## 2024-04-18 RX ADMIN — Medication 3 MILLIGRAM(S): at 22:46

## 2024-04-18 RX ADMIN — Medication 3 MILLILITER(S): at 05:14

## 2024-04-18 RX ADMIN — Medication 40 MILLIEQUIVALENT(S): at 10:52

## 2024-04-18 RX ADMIN — SIMETHICONE 80 MILLIGRAM(S): 80 TABLET, CHEWABLE ORAL at 11:17

## 2024-04-18 RX ADMIN — ESCITALOPRAM OXALATE 10 MILLIGRAM(S): 10 TABLET, FILM COATED ORAL at 17:51

## 2024-04-18 RX ADMIN — Medication 1 APPLICATION(S): at 05:17

## 2024-04-18 RX ADMIN — ZINC SULFATE TAB 220 MG (50 MG ZINC EQUIVALENT) 220 MILLIGRAM(S): 220 (50 ZN) TAB at 17:53

## 2024-04-18 RX ADMIN — INSULIN GLARGINE 5 UNIT(S): 100 INJECTION, SOLUTION SUBCUTANEOUS at 18:01

## 2024-04-18 RX ADMIN — LIDOCAINE 1 PATCH: 4 CREAM TOPICAL at 10:19

## 2024-04-18 RX ADMIN — Medication 40 MILLIEQUIVALENT(S): at 14:06

## 2024-04-18 RX ADMIN — Medication 5 MILLILITER(S): at 17:56

## 2024-04-18 RX ADMIN — SIMETHICONE 80 MILLIGRAM(S): 80 TABLET, CHEWABLE ORAL at 05:10

## 2024-04-18 RX ADMIN — Medication 1 DROP(S): at 11:16

## 2024-04-18 RX ADMIN — HYDROMORPHONE HYDROCHLORIDE 1 MILLIGRAM(S): 2 INJECTION INTRAMUSCULAR; INTRAVENOUS; SUBCUTANEOUS at 03:59

## 2024-04-18 RX ADMIN — Medication 15 MILLILITER(S): at 11:18

## 2024-04-18 RX ADMIN — Medication 3 MILLILITER(S): at 23:08

## 2024-04-18 RX ADMIN — Medication 1 APPLICATION(S): at 14:06

## 2024-04-18 RX ADMIN — CHLORHEXIDINE GLUCONATE 15 MILLILITER(S): 213 SOLUTION TOPICAL at 17:52

## 2024-04-18 RX ADMIN — SIMETHICONE 80 MILLIGRAM(S): 80 TABLET, CHEWABLE ORAL at 17:53

## 2024-04-18 RX ADMIN — Medication 3 MILLILITER(S): at 18:18

## 2024-04-18 RX ADMIN — Medication 1: at 00:28

## 2024-04-18 RX ADMIN — Medication 1: at 11:53

## 2024-04-18 RX ADMIN — Medication 125 MILLIGRAM(S): at 09:25

## 2024-04-18 RX ADMIN — Medication 125 MILLIGRAM(S): at 05:10

## 2024-04-18 RX ADMIN — CHLORHEXIDINE GLUCONATE 15 MILLILITER(S): 213 SOLUTION TOPICAL at 05:18

## 2024-04-18 RX ADMIN — Medication 1 APPLICATION(S): at 22:44

## 2024-04-18 RX ADMIN — CHLORHEXIDINE GLUCONATE 1 APPLICATION(S): 213 SOLUTION TOPICAL at 05:18

## 2024-04-18 RX ADMIN — LIDOCAINE 1 PATCH: 4 CREAM TOPICAL at 07:59

## 2024-04-18 RX ADMIN — GABAPENTIN 100 MILLIGRAM(S): 400 CAPSULE ORAL at 22:49

## 2024-04-18 RX ADMIN — Medication 2000 UNIT(S): at 14:05

## 2024-04-18 RX ADMIN — PANTOPRAZOLE SODIUM 40 MILLIGRAM(S): 20 TABLET, DELAYED RELEASE ORAL at 05:15

## 2024-04-18 RX ADMIN — QUETIAPINE FUMARATE 12.5 MILLIGRAM(S): 200 TABLET, FILM COATED ORAL at 16:06

## 2024-04-18 RX ADMIN — Medication 2: at 18:01

## 2024-04-18 RX ADMIN — Medication 10 MILLIGRAM(S): at 11:16

## 2024-04-18 RX ADMIN — Medication 87.5 MICROGRAM(S): at 05:16

## 2024-04-18 RX ADMIN — Medication 1 APPLICATION(S): at 05:18

## 2024-04-18 RX ADMIN — Medication 5 MILLILITER(S): at 05:18

## 2024-04-18 RX ADMIN — Medication 15 MILLIGRAM(S): at 16:00

## 2024-04-18 RX ADMIN — Medication 5 MILLILITER(S): at 11:18

## 2024-04-18 RX ADMIN — LIDOCAINE 1 PATCH: 4 CREAM TOPICAL at 08:01

## 2024-04-18 RX ADMIN — Medication 1 DROP(S): at 05:18

## 2024-04-18 NOTE — PROGRESS NOTE ADULT - ASSESSMENT
73 yo F PMH T2DM on insulin, HTN, HLD, hypothyroidism, RA on Prednisone, Fibromyalgia, cerebral aneurysm s/p repair, chronic hypercapnic respiratory failure s/p trach (vent dependent) and Chronic PEG 2022, recurrent C. diff infection (follows with Dr. Newman) , bedbound who presented from home with G tube dislodgement and redness around the site. Had CT Abdomen/Pelvis done showing dislodgement of G tube with balloon in the anterior abdominal wall with air filled track to stomach. Admitted to MICU for ventilator management and given vancomycin/meropenem empirically for treatment of abd wall cellulitis. Per family patient has had Known c diff infection for past 2-3 weeks.  and was being Treated with Fidaxomicin.  IR team exchanged PEG on 4/3 however later in the day it remained with leakage.  IR Attending adjusted PEG at bedside on 4/4 and PEG remained with moderate amount of PEG leakage once feeds initiated at reduced rate.  GI team re-consulted as second opinion for PEG management.  On 4/9 PEG was found out of place, a emerson catheter was placed in the stoma to maintain patency.  Patient was planned for PEG revision with both Surgery and GI team involved on 4/11 however patient had fevers up to 102F and procedure was cancelled for infectious work-up.  An 18 Fr PEG tube placed at bedside on 4/12 (original PEG size was 20Fr) as stoma site appears to have closed. Antibiotics were switched from Meropenem to Cefepime, completed 4/14. Patient S/p PEG-J and closure of gastric fistula with GI and surgery in endo suite on 4/16.        4/17: Patient S/p G-J Tube placement and closure of gastric fistula yesterday by GI and Surgery. Patient evaluated at bedside with Surgery  this morning and case d/w  will initiate feeds today @ 20 cc/hr. Heme recommendations appreciated fibrinogen sent           (Results pending), B12 and Folate ordered for the morning.  73 yo F PMH T2DM on insulin, HTN, HLD, hypothyroidism, RA on Prednisone, Fibromyalgia, cerebral aneurysm s/p repair, chronic hypercapnic respiratory failure s/p trach (vent dependent) and Chronic PEG 2022, recurrent C. diff infection (follows with Dr. Newman) , bedbound who presented from home with G tube dislodgement and redness around the site. Had CT Abdomen/Pelvis done showing dislodgement of G tube with balloon in the anterior abdominal wall with air filled track to stomach. Admitted to MICU for ventilator management and given vancomycin/meropenem empirically for treatment of abd wall cellulitis. Per family patient has had Known c diff infection for past 2-3 weeks.  and was being Treated with Fidaxomicin.  IR team exchanged PEG on 4/3 however later in the day it remained with leakage.  IR Attending adjusted PEG at bedside on 4/4 and PEG remained with moderate amount of PEG leakage once feeds initiated at reduced rate.  GI team re-consulted as second opinion for PEG management.  On 4/9 PEG was found out of place, a emerson catheter was placed in the stoma to maintain patency.  Patient was planned for PEG revision with both Surgery and GI team involved on 4/11 however patient had fevers up to 102F and procedure was cancelled for infectious work-up.  An 18 Fr PEG tube placed at bedside on 4/12 (original PEG size was 20Fr) as stoma site appears to have closed. Antibiotics were switched from Meropenem to Cefepime, completed 4/14. Patient S/p PEG-J and closure of gastric fistula with GI and surgery in endo suite on 4/16.      4/17: Patient S/p G-J Tube placement and closure of gastric fistula yesterday by GI and Surgery. Patient evaluated at bedside with Surgery  this morning and case d/w  will initiate feeds today @ 20 cc/hr. Heme recommendations appreciated fibrinogen sent           (Results pending), B12 and Folate ordered for the morning.   4/18:  Tolerating feeds via PEG-J thus far, slowly advancing to goal rate throughout the day.  Daughter concerned patient may be having rheumatic flare given arthralgias and hand swelling.  As high dose steroids would impair surgical wound healing will defer to one time use of Toradol 15mg for acute flare.   PS weaning as tolerated.

## 2024-04-18 NOTE — CHART NOTE - NSCHARTNOTEFT_GEN_A_CORE
Nutrition   Chart Note  Daughter requesting to speak with RD     Diet, NPO with Tube Feed:   Tube Feeding Modality: Gastrostomy  Casie Frazier Peptide 1.5 (KFPEPT1.5RTH)  Total Volume for 24 Hours (mL): 1040  Continuous  Starting Tube Feed Rate {mL per Hour}: 30  Increase Tube Feed Rate by (mL): 10     Every 3 hours  Until Goal Tube Feed Rate (mL per Hour): 65  Tube Feed Duration (in Hours): 16  Tube Feed Start Time: 06:00  Tube Feed Stop Time: 22:00  Free Water Flush  Pump   Rate (mL per Hour): 20   Frequency: Every 2 Hours  Alfred(7 Gm Arginine/7 Gm Glut/1.2 Gm HMB     Qty per Day:  2 (04-18-24 @ 10:49) [Active]    EN order providing at 100% provision: 1600kcal (33kcal/kg) and 77g protein (1.6g/kg)  During visit, pt rate being increased from 20ml to 30ml with RN.     MEDICATIONS  (STANDING):  amLODIPine   Tablet  ascorbic acid  insulin glargine Injectable (LANTUS)  insulin lispro (ADMELOG) corrective regimen sliding scale  levothyroxine Injectable  multivitamin/minerals/iron Oral Solution (CENTRUM)  pantoprazole  Injectable  phytonadione   Solution  potassium chloride   Solution  predniSONE  Solution  simethicone  vancomycin    Solution    Pertinent Labs: 04-18 @ 06:51: Na 140, BUN 5<L>, Cr <0.30<L>, <H>, K+ 3.1<L>, Phos 2.6, Mg 1.8, Alk Phos 52, ALT/SGPT 32, AST/SGOT 52<H>, HbA1c --    A1C with Estimated Average Glucose Result: 6.2 % (04-04-24 @ 07:40)  A1C with Estimated Average Glucose Result: 7.5 % (10-28-23 @ 07:18)    Finger Sticks:  POCT Blood Glucose.: 181 mg/dL (04-18 @ 11:45)  POCT Blood Glucose.: 147 mg/dL (04-18 @ 05:44)  POCT Blood Glucose.: 192 mg/dL (04-17 @ 23:58)  POCT Blood Glucose.: 215 mg/dL (04-17 @ 17:20)  POCT Blood Glucose.: 119 mg/dL (04-17 @ 11:59)    Triglycerides, Serum: 295 mg/dL (04-04-24 @ 07:09)    Estimated Needs based on dosing weight 48.1kg   30-35kcal/kg 1443-1683kcal/day  1.3-1.6g/kg 62-77g protein/day  defer fluid needs to team     Pt daughter (Marysol Woods) requesting no more than 1000ml total volume for the patient daily. RD explained pt caloric and protein needs and reasoning for current order.   Can consider 55ml/hr x 18 hours to provide a total volume of 990ml, 1523kcal (~32kcal/kg) and 73g protein (1.5g/kg). This will still meet pt nutritional needs. 3 cans of [employer] Peptide 1.5 is ~975ml.   Continue Alfred to aid in wound healing.  Continue Multivitamin, Vitamin C to aid in wound healing. Daughter requesting vitamin D, can also add to aid in wound healing. Can add zinc x 7 days to aid in wound healing.   Daughter requesting YOVANI Calvin informed we do not have that here. Can consider providing danactive daily to aid in gut health.   Team alerted.     Rufina Morris, MS, RD, CDN / Teams Nutrition   Chart Note  Daughter requesting to speak with RD     Diet, NPO with Tube Feed:   Tube Feeding Modality: Gastrostomy  Casie Frazier Peptide 1.5 (KFPEPT1.5RTH)  Total Volume for 24 Hours (mL): 1040  Continuous  Starting Tube Feed Rate {mL per Hour}: 30  Increase Tube Feed Rate by (mL): 10     Every 3 hours  Until Goal Tube Feed Rate (mL per Hour): 65  Tube Feed Duration (in Hours): 16  Tube Feed Start Time: 06:00  Tube Feed Stop Time: 22:00  Free Water Flush  Pump   Rate (mL per Hour): 20   Frequency: Every 2 Hours  Alfred(7 Gm Arginine/7 Gm Glut/1.2 Gm HMB     Qty per Day:  2 (04-18-24 @ 10:49) [Active]    EN order providing at 100% provision: 1600kcal (33kcal/kg) and 77g protein (1.6g/kg)  During visit, pt rate being increased from 20ml to 30ml with RN.     MEDICATIONS  (STANDING):  amLODIPine   Tablet  ascorbic acid  insulin glargine Injectable (LANTUS)  insulin lispro (ADMELOG) corrective regimen sliding scale  levothyroxine Injectable  multivitamin/minerals/iron Oral Solution (CENTRUM)  pantoprazole  Injectable  phytonadione   Solution  potassium chloride   Solution  predniSONE  Solution  simethicone  vancomycin    Solution    Pertinent Labs: 04-18 @ 06:51: Na 140, BUN 5<L>, Cr <0.30<L>, <H>, K+ 3.1<L>, Phos 2.6, Mg 1.8, Alk Phos 52, ALT/SGPT 32, AST/SGOT 52<H>, HbA1c --    A1C with Estimated Average Glucose Result: 6.2 % (04-04-24 @ 07:40)  A1C with Estimated Average Glucose Result: 7.5 % (10-28-23 @ 07:18)    Finger Sticks:  POCT Blood Glucose.: 181 mg/dL (04-18 @ 11:45)  POCT Blood Glucose.: 147 mg/dL (04-18 @ 05:44)  POCT Blood Glucose.: 192 mg/dL (04-17 @ 23:58)  POCT Blood Glucose.: 215 mg/dL (04-17 @ 17:20)  POCT Blood Glucose.: 119 mg/dL (04-17 @ 11:59)    Triglycerides, Serum: 295 mg/dL (04-04-24 @ 07:09)    Estimated Needs based on dosing weight 48.1kg   30-35kcal/kg 1443-1683kcal/day  1.3-1.6g/kg 62-77g protein/day  defer fluid needs to team     Pt daughter (Marysol Woods) requesting no more than 1000ml total volume for the patient daily. RD explained pt caloric and protein needs and reasoning for current order.   Can consider 55ml/hr x 18 hours to provide a total volume of 990ml, 1523kcal (~32kcal/kg) and 73g protein (1.5g/kg). This will still meet pt nutritional needs. 3 cans of [employer] Peptide 1.5 is ~975ml.   Continue Alfred to aid in wound healing.  Continue Multivitamin, Vitamin C to aid in wound healing. Daughter requesting vitamin D, can also add to aid in wound healing. Can add zinc x 7 days to aid in wound healing.   Daughter requesting YOVANI Calvin informed we do not have that here.   Team alerted.     Rufina Morris, MS, RD, CDN / Teams Nutrition   Chart Note  Daughter requesting to speak with RD     Diet, NPO with Tube Feed:   Tube Feeding Modality: Gastrostomy  Casie Frazier Peptide 1.5 (KFPEPT1.5RTH)  Total Volume for 24 Hours (mL): 1040  Continuous  Starting Tube Feed Rate {mL per Hour}: 30  Increase Tube Feed Rate by (mL): 10     Every 3 hours  Until Goal Tube Feed Rate (mL per Hour): 65  Tube Feed Duration (in Hours): 16  Tube Feed Start Time: 06:00  Tube Feed Stop Time: 22:00  Free Water Flush  Pump   Rate (mL per Hour): 20   Frequency: Every 2 Hours  Alfred(7 Gm Arginine/7 Gm Glut/1.2 Gm HMB     Qty per Day:  2 (04-18-24 @ 10:49) [Active]    EN order providing at 100% provision: 1600kcal (33kcal/kg) and 77g protein (1.6g/kg)  During visit, pt rate being increased from 20ml to 30ml with RN.     MEDICATIONS  (STANDING):  amLODIPine   Tablet  ascorbic acid  insulin glargine Injectable (LANTUS)  insulin lispro (ADMELOG) corrective regimen sliding scale  levothyroxine Injectable  multivitamin/minerals/iron Oral Solution (CENTRUM)  pantoprazole  Injectable  phytonadione   Solution  potassium chloride   Solution  predniSONE  Solution  simethicone  vancomycin    Solution    Pertinent Labs: 04-18 @ 06:51: Na 140, BUN 5<L>, Cr <0.30<L>, <H>, K+ 3.1<L>, Phos 2.6, Mg 1.8, Alk Phos 52, ALT/SGPT 32, AST/SGOT 52<H>, HbA1c --    A1C with Estimated Average Glucose Result: 6.2 % (04-04-24 @ 07:40)  A1C with Estimated Average Glucose Result: 7.5 % (10-28-23 @ 07:18)    Finger Sticks:  POCT Blood Glucose.: 181 mg/dL (04-18 @ 11:45)  POCT Blood Glucose.: 147 mg/dL (04-18 @ 05:44)  POCT Blood Glucose.: 192 mg/dL (04-17 @ 23:58)  POCT Blood Glucose.: 215 mg/dL (04-17 @ 17:20)  POCT Blood Glucose.: 119 mg/dL (04-17 @ 11:59)    Triglycerides, Serum: 295 mg/dL (04-04-24 @ 07:09)    Estimated Needs based on dosing weight 48.1kg   30-35kcal/kg 1443-1683kcal/day  1.3-1.6g/kg 62-77g protein/day  defer fluid needs to team     Pt daughter (Marysol Woods) requesting no more than 1000ml total volume for the patient daily. RD explained pt caloric and protein needs and reasoning for current order.   Can consider 55ml/hr x 18 hours to provide a total volume of 990ml, 1523kcal (~32kcal/kg) and 73g protein (1.5g/kg). This will still meet pt nutritional needs. 3 cans of [employer] Peptide 1.5 is ~975ml.   Continue Alfred to aid in wound healing.  Continue Multivitamin, Vitamin C to aid in wound healing. Daughter requesting vitamin D, can also add to aid in wound healing. Can add zinc x 7 days to aid in wound healing.   Daughter requesting YOVANI Calvin informed we do not have that here. Suggested follow up with outpatient GI doctor with [facility].   Team alerted.     Rufina Morris, MS, RD, CDN / Teams

## 2024-04-18 NOTE — PROGRESS NOTE ADULT - PROBLEM SELECTOR PLAN 2
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  F/u ABG appropriate.  - PS weaning as tolerated.   - Chest PT, Duonebs q 6 hrs

## 2024-04-18 NOTE — PROGRESS NOTE ADULT - PROBLEM SELECTOR PLAN 9
- Daughter interested in patient having pleasure feeds by mouth.  - Daughter states patient was cleared by outpatient Pulmonologist to have small amounts of liquids orally  - Daughter informed that patient will need swallow evaluation while inpatient to assess aspiration risk   - Previously Explained that a swallow evaluation would further inform her of appropriate texture to decrease risk of aspiration if patient is appropriate. However if patient fails evaluation and is not cleared for an oral diet  - Daughter would be assuming risks of aspiration complications if she chooses to feed patient by mouth  - Once patient is more stable the plan is to proceed with FEESST  - Daughter provided with medical update over the phone today  - Code Status: DNR

## 2024-04-18 NOTE — PROGRESS NOTE ADULT - SUBJECTIVE AND OBJECTIVE BOX
Patient is a 74y old  Female who presents with a chief complaint of Dislodged G Tube (17 Apr 2024 18:53)      Interval Events:    REVIEW OF SYSTEMS:  [ ] Positive  [ ] All other systems negative  [ ] Unable to assess ROS because ________    Vital Signs Last 24 Hrs  T(C): 36.3 (04-18-24 @ 05:28), Max: 37.3 (04-17-24 @ 12:30)  T(F): 97.4 (04-18-24 @ 05:28), Max: 99.2 (04-17-24 @ 12:30)  HR: 74 (04-18-24 @ 05:28) (70 - 101)  BP: 147/74 (04-18-24 @ 05:28) (113/67 - 147/74)  RR: 16 (04-18-24 @ 05:28) (16 - 20)  SpO2: 100% (04-18-24 @ 05:28) (98% - 100%)    PHYSICAL EXAM:  HEENT:   [ ]Tracheostomy:  [ ]Pupils equal  [ ]No oral lesions  [ ]Abnormal    SKIN  [ ]No Rash  [ ] Abnormal  [ ] pressure    CARDIAC  [ ]Regular  [ ]Abnormal    PULMONARY  [ ]Bilateral Clear Breath Sounds  [ ]Normal Excursion  [ ]Abnormal    GI  [ ]PEG      [ ] +BS		              [ ]Soft, nondistended, nontender	  [ ]Abnormal    MUSCULOSKELETAL                                   [ ]Bedbound                 [ ]Abnormal    [ ]Ambulatory/OOB to chair                           EXTREMITIES                                         [ ]Normal  [ ]Edema                           NEUROLOGIC  [ ] Normal, non focal  [ ] Focal findings:    PSYCHIATRIC  [ ]Alert and appropriate  [ ] Sedated	 [ ]Agitated    :  Mcbride: [ ] Yes, if yes: Date of Placement:                   [  ] No    LINES: Central Lines [ ] Yes, if yes: Date of Placement                                     [  ] No    HOSPITAL MEDICATIONS:  MEDICATIONS  (STANDING):  albuterol/ipratropium for Nebulization 3 milliLiter(s) Nebulizer every 6 hours  amLODIPine   Tablet 10 milliGRAM(s) Oral <User Schedule>  artificial tears (preservative free) Ophthalmic Solution 1 Drop(s) Both EYES four times a day  ascorbic acid 500 milliGRAM(s) Oral daily  Biotene Dry Mouth Oral Rinse 5 milliLiter(s) Swish and Spit every 6 hours  chlorhexidine 0.12% Liquid 15 milliLiter(s) Oral Mucosa every 12 hours  chlorhexidine 4% Liquid 1 Application(s) Topical <User Schedule>  erythromycin   Ointment 1 Application(s) Both EYES three times a day  escitalopram 10 milliGRAM(s) Oral <User Schedule>  fentaNYL   Patch  25 MICROgram(s)/Hr 1 Patch Transdermal every 72 hours  gabapentin Solution 100 milliGRAM(s) Oral <User Schedule>  insulin glargine Injectable (LANTUS) 5 Unit(s) SubCutaneous <User Schedule>  insulin lispro (ADMELOG) corrective regimen sliding scale   SubCutaneous every 6 hours  levothyroxine Injectable 87.5 MICROGram(s) IV Push <User Schedule>  lidocaine   4% Patch 1 Patch Transdermal at bedtime  lidocaine   4% Patch 1 Patch Transdermal at bedtime  melatonin Liquid 3 milliGRAM(s) Oral at bedtime  multivitamin/minerals/iron Oral Solution (CENTRUM) 15 milliLiter(s) Oral daily  pantoprazole  Injectable 40 milliGRAM(s) IV Push every 12 hours  phytonadione   Solution 10 milliGRAM(s) Oral daily  predniSONE  Solution 5 milliGRAM(s) Oral <User Schedule>  QUEtiapine 12.5 milliGRAM(s) Oral <User Schedule>  simethicone 80 milliGRAM(s) Chew every 6 hours  surgical lubricant sterile 1 Application(s) Topical every 8 hours  vancomycin    Solution 125 milliGRAM(s) Oral every 6 hours    MEDICATIONS  (PRN):  acetaminophen   Oral Liquid .. 650 milliGRAM(s) Oral every 6 hours PRN Mild Pain (1 - 3)  calamine/zinc oxide Lotion 1 Application(s) Topical daily PRN Rash and/or Itching  HYDROmorphone  Injectable 1 milliGRAM(s) IV Push every 6 hours PRN Severe Pain (7 - 10)  HYDROmorphone  Injectable 0.5 milliGRAM(s) IV Push every 4 hours PRN Moderate Pain (4 - 6)  ondansetron Injectable 4 milliGRAM(s) IV Push every 8 hours PRN Nausea and/or Vomiting  sodium chloride 0.65% Nasal 1 Spray(s) Both Nostrils every 12 hours PRN Congestion      LABS:                        8.9    5.54  )-----------( 101      ( 18 Apr 2024 07:11 )             29.5     04-18    140  |  104  |  5<L>  ----------------------------<  142<H>  3.1<L>   |  26  |  <0.30<L>    Ca    9.0      18 Apr 2024 06:51  Phos  2.6     04-18  Mg     1.8     04-18    TPro  5.8<L>  /  Alb  2.4<L>  /  TBili  2.5<H>  /  DBili  x   /  AST  52<H>  /  ALT  32  /  AlkPhos  52  04-18    PT/INR - ( 18 Apr 2024 07:07 )   PT: 15.4 sec;   INR: 1.48 ratio         PTT - ( 18 Apr 2024 07:07 )  PTT:31.9 sec  Urinalysis Basic - ( 18 Apr 2024 06:51 )    Color: x / Appearance: x / SG: x / pH: x  Gluc: 142 mg/dL / Ketone: x  / Bili: x / Urobili: x   Blood: x / Protein: x / Nitrite: x   Leuk Esterase: x / RBC: x / WBC x   Sq Epi: x / Non Sq Epi: x / Bacteria: x          CAPILLARY BLOOD GLUCOSE    MICROBIOLOGY:     RADIOLOGY:  [ ] Reviewed and interpreted by me    Mode: AC/ CMV (Assist Control/ Continuous Mandatory Ventilation)  RR (machine): 12  TV (machine): 350  FiO2: 30  PEEP: 5  ITime: 1  MAP: 10  PIP: 26   Patient is a 74y old  Female who presents with a chief complaint of Dislodged G Tube (17 Apr 2024 18:53)      Interval Events: No events reported over night.  Tolerated feeds via new PEG-J, no leaking reported.    REVIEW OF SYSTEMS:  [X] Positive: Pain when hands are moved  [ ] All other systems negative  [ ] Unable to assess ROS because ___    Vital Signs Last 24 Hrs  T(C): 36.3 (04-18-24 @ 05:28), Max: 37.3 (04-17-24 @ 12:30)  T(F): 97.4 (04-18-24 @ 05:28), Max: 99.2 (04-17-24 @ 12:30)  HR: 74 (04-18-24 @ 05:28) (70 - 101)  BP: 147/74 (04-18-24 @ 05:28) (113/67 - 147/74)  RR: 16 (04-18-24 @ 05:28) (16 - 20)  SpO2: 100% (04-18-24 @ 05:28) (98% - 100%)      PHYSICAL EXAM:  HEENT:   [X] Tracheostomy:  #8 distal XLT Cuffed Shiley  [X] PERRL B/L; EOMI; eyes red L>R  [X] No oral lesions  [ ] Abnormal    SKIN  [ ] No Rash  [ ] Abnormal  [X] pressure: B/L buttocks/sacral deep tissue injury     CARDIAC  [X] Regular  [ ]Abnormal    PULMONARY  [X] Bilateral Clear Breath Sounds  [ ] Normal Excursion  [ ] Abnormal    GI  [X] PEG      [X] +BS		              [X] Soft, nondistended, nontender	  [ ] Abnormal    MUSCULOSKELETAL                                   [X] Bedbound                 [ ] Abnormal    [ ] Ambulatory/OOB to chair                           EXTREMITIES                                         [ ] Normal  [X] Edema 1+ B/L hand edema                        NEUROLOGIC  [ ] Normal, non focal  [X] Focal findings:  Alert and Oriented x3; responds to questions by mouthing words; +gag reflex/cough; no facial asymmetry observed; moves all distal limbs upon request; B/L plantar flexion    PSYCHIATRIC  [X] Lethargic but arousable, affect appropriate  [ ] Sedated	 [ ]Agitated    :  Mcbride: [ ] Yes, if yes: Date of Placement:                   [X ] No    LINES: Central Lines [ ] Yes, if yes: Date of Placement                                     [X ] No      HOSPITAL MEDICATIONS:  MEDICATIONS  (STANDING):  albuterol/ipratropium for Nebulization 3 milliLiter(s) Nebulizer every 6 hours  amLODIPine   Tablet 10 milliGRAM(s) Oral <User Schedule>  artificial tears (preservative free) Ophthalmic Solution 1 Drop(s) Both EYES four times a day  ascorbic acid 500 milliGRAM(s) Oral daily  Biotene Dry Mouth Oral Rinse 5 milliLiter(s) Swish and Spit every 6 hours  chlorhexidine 0.12% Liquid 15 milliLiter(s) Oral Mucosa every 12 hours  chlorhexidine 4% Liquid 1 Application(s) Topical <User Schedule>  erythromycin   Ointment 1 Application(s) Both EYES three times a day  escitalopram 10 milliGRAM(s) Oral <User Schedule>  fentaNYL   Patch  25 MICROgram(s)/Hr 1 Patch Transdermal every 72 hours  gabapentin Solution 100 milliGRAM(s) Oral <User Schedule>  insulin glargine Injectable (LANTUS) 5 Unit(s) SubCutaneous <User Schedule>  insulin lispro (ADMELOG) corrective regimen sliding scale   SubCutaneous every 6 hours  levothyroxine Injectable 87.5 MICROGram(s) IV Push <User Schedule>  lidocaine   4% Patch 1 Patch Transdermal at bedtime  lidocaine   4% Patch 1 Patch Transdermal at bedtime  melatonin Liquid 3 milliGRAM(s) Oral at bedtime  multivitamin/minerals/iron Oral Solution (CENTRUM) 15 milliLiter(s) Oral daily  pantoprazole  Injectable 40 milliGRAM(s) IV Push every 12 hours  phytonadione   Solution 10 milliGRAM(s) Oral daily  predniSONE  Solution 5 milliGRAM(s) Oral <User Schedule>  QUEtiapine 12.5 milliGRAM(s) Oral <User Schedule>  simethicone 80 milliGRAM(s) Chew every 6 hours  surgical lubricant sterile 1 Application(s) Topical every 8 hours  vancomycin    Solution 125 milliGRAM(s) Oral every 6 hours    MEDICATIONS  (PRN):  acetaminophen   Oral Liquid .. 650 milliGRAM(s) Oral every 6 hours PRN Mild Pain (1 - 3)  calamine/zinc oxide Lotion 1 Application(s) Topical daily PRN Rash and/or Itching  HYDROmorphone  Injectable 1 milliGRAM(s) IV Push every 6 hours PRN Severe Pain (7 - 10)  HYDROmorphone  Injectable 0.5 milliGRAM(s) IV Push every 4 hours PRN Moderate Pain (4 - 6)  ondansetron Injectable 4 milliGRAM(s) IV Push every 8 hours PRN Nausea and/or Vomiting  sodium chloride 0.65% Nasal 1 Spray(s) Both Nostrils every 12 hours PRN Congestion      LABS:                        8.9    5.54  )-----------( 101      ( 18 Apr 2024 07:11 )             29.5     04-18    140  |  104  |  5<L>  ----------------------------<  142<H>  3.1<L>   |  26  |  <0.30<L>    Ca    9.0      18 Apr 2024 06:51  Phos  2.6     04-18  Mg     1.8     04-18    TPro  5.8<L>  /  Alb  2.4<L>  /  TBili  2.5<H>  /  DBili  x   /  AST  52<H>  /  ALT  32  /  AlkPhos  52  04-18    PT/INR - ( 18 Apr 2024 07:07 )   PT: 15.4 sec;   INR: 1.48 ratio         PTT - ( 18 Apr 2024 07:07 )  PTT:31.9 sec  Urinalysis Basic - ( 18 Apr 2024 06:51 )    Color: x / Appearance: x / SG: x / pH: x  Gluc: 142 mg/dL / Ketone: x  / Bili: x / Urobili: x   Blood: x / Protein: x / Nitrite: x   Leuk Esterase: x / RBC: x / WBC x   Sq Epi: x / Non Sq Epi: x / Bacteria: x          CAPILLARY BLOOD GLUCOSE    MICROBIOLOGY:     RADIOLOGY:  [ ] Reviewed and interpreted by me    Mode: AC/ CMV (Assist Control/ Continuous Mandatory Ventilation)  RR (machine): 12  TV (machine): 350  FiO2: 30  PEEP: 5  ITime: 1  MAP: 10  PIP: 26

## 2024-04-18 NOTE — PROGRESS NOTE ADULT - SUBJECTIVE AND OBJECTIVE BOX
afeb    on mechanical vent (12 / 350 / 30% / +5) via 8.0 Shiley XLT distal trach  trach site clean without evidence of infection    soft / NT / ND  gastrocutaneous fistula wound clean with minimal drainage on gauze  PEG-J site clean without evidence of infection  PEG bumper at 4.0 cm from skin  on [place] 1.5 @ 20 mL/hr via J-tube      WBC = 5   afeb    on mechanical vent (12 / 350 / 30% / +5) via 8.0 Shiley XLT distal trach  trach site clean without evidence of infection    soft / NT / ND  gastrocutaneous fistula wound clean with minimal drainage on gauze  PEG-J site clean without evidence of infection  I loosened the PEG bumper 3.0 -> 4.0 cm from skin  on [place] 1.5 @ 20 mL/hr via J-tube      WBC = 5

## 2024-04-18 NOTE — PROGRESS NOTE ADULT - ASSESSMENT
73yo woman  h/o T2DM (4/4/24: A1C = 6.2%), HTN, HLD, anemia, hypothyroidism, RA, fibromyalgia, remote cerebral aneurysm repair, acute hypercapnic respiratory failure with tracheostomy, PEG tube (initially placed 2022), and bedbound, recurrent C diff presented for PEG tube dislodgment. Admitted 4/2/24  weight = 54.5 kg    Recurrent C diff - first episode Aug 2023 treated with tapering PO vanco, recurrent episode 1/31/24 treated with PO vanco and then fidaxomicin completed in Feb - most recently tested positive 3/20/24 seen by Dr. Newman 3/29 outpatient, started on fidaxomicin that day - tube feeds concurrently held, unclear if improving afterwards per family  Chronic sacral decubitus wound  3/20 Klebisella bacteruria R only to amp and sputum few Pseudomonas R to FQs w/o polys on gram stian - treatment of urine was deferred to avoid exacerbating C diff    Tmax 100, no leukocytosis  Concern for cellulitis around PEG tube site - area with very minimal erythema, remarkable receded from skin norm placed on admission  UA 11 WBC  CT a/p: no infectious focus or colitis  MRSA PCR+    4/3 IR - PEG exchanged bedside to 20 Fr [place] EnFit  - Bedside XR demonstrates appropriately positioned G-tube with contrast filling into the stomach and bowel.    4/2 Blood Cultures x 2 NGTD;  Positive MRSA/MSSA PCR  4/3 Urine cultures NEG  4/4 fever  4/4 Wound care assessment appreciated:   " area of persistent nonblanchable dark discoloration that is inconsistent with a patient's surrounding skin tone as well as a white juan j film noted over B/L buttocks/sacral skin, area measures approximately 10cm x 10cm x 0cm- presentation is consistent with a deep tissue injury in evolution with incontinence and fungal involvement present on admission. Within the apex of the gluteal cleft/base of sacrum there is full thickness skin loss with red, beefy tissue noted, area measures approximately 3cm x 1cm x 0.3cm- presentation is consistent with a deep tissue injury in evolution with incontinence involvement present on admission."  4/7 fever; Meropenem resumed  Positive Blood Culture x1 Serratia marcesens    4/16 OR: Gastrojejunostomy, percutaneous, endoscopic, Endoscopic assisted closure of PEG site. Endoscopic assisted percutaneous gastrojejunostomy tube placement.      Antibiotics  Meropenem 4/2 -->4/3; 4/7 --> 4/11  Cefepime 4/11 --> 4/14  IV Vanco 4/2  Fdaxomicin 4/3--> 4/8  PO Vanco 4/8 -->     Suggest:  maintain fluid and electrolytes with increased diarrhea - KCl repletion  Continue po Vanco  Extended po Vancomycin taper  Bezlotoxumab (Zinplava) at end of Vanco course as out pt    Vanco dosing schedule  Vancomycin 125 mg po 4 times daily 4/8 --> 4/18  Vancomycin 125 mg po 2 times daily 4/19 --> 4/25  Vancomycin 125 mg po once daily 4/26 -->5/2  Vancomycin 125 mg po once every other day 5/3 --> 5/9  Vancomycin 125 mg po once every third day 5/10 --> 5/23/24    Given multiple recurrences in context of advanced age, debility and multiple co-morbidities, administration of Bezlotoxumab (Zinplava) 500 mg over 1 hours is indicated to reduce future recurrences  Ref: Norm Wheeler M.D., Rojas Contreras M.D., Scott Griffith, Ph.D., Shiva Abraham M.D., Gabriel Falcon D.O., Pedro Weiss M.D., Talib Hampton M.D., Sena Hughes M.D., Raimundo Marte M.D., Melissa Soria M.D., Piyush Lamas M.D., Eliceo Romero M.D., Neyda Xie M.D., Jude Peters M.D., Neelima Vela B.S.M.T., Hannah Cardenas, Ph.D., Ana Quiroz, B.S., Obie Chirinos M.S., Khushbu Flores M.D., Jean Carlos Ayala M.D., and Kristin Lopez, Ph.D., for the [employer] I and MOD[employer] II Investigators*    Bezlotoxumab is suggested in IDSA guidelines -  Ref: Clinical Practice Guidelines for the Management of Clostridioides difficile Infection in Adults: 2021 Update by SHEA/IDSA  Published VERNA, 6/14/2021; Clinical Infectious Diseases, [id number], https://doi.org/10.1093/verna/xbrz305    discussed with daughter  discussed with ACP --   Zinplava to be administered at home at end of vanco taper

## 2024-04-18 NOTE — PROGRESS NOTE ADULT - PROBLEM SELECTOR PLAN 7
- Possibly reactive in setting of antibiotics/active infection  - History of AOCD  - Heme w/u in progress; Iron studies c/w AOCD   - B12 / Folate ( Ordered for AM )   - Fibrinogen ( Sent / Results Pending); Transfuse cryoprecipitate if fibrinogen < 100  - Patient with slightly elevated INR; Vitk 10mg QD X 3 Days ( Ends 4/20)   - Transfuse for hg < 7.0 and platelets < 10k, < 20k if febrile and < 50k if bleeding ( Heme Reccs)

## 2024-04-18 NOTE — PROGRESS NOTE ADULT - ASSESSMENT
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 continue dry dressings daily.      I reviewed her labs.  care coordinated with RCU team.   plan discussed with her daughter (by phone).

## 2024-04-18 NOTE — PROGRESS NOTE ADULT - PROBLEM SELECTOR PLAN 5
- Patient with Hx of RA on Enbrel as outpatient ( Currently being held)   - Outpatient Rheumatologist Dr. Egan   - Pt w/ sclera icterus c/w Active RA  - Seen by Rheum inpatient; recc outpatient follow up   - Cont prednisone and Hydromorphone prn pain

## 2024-04-18 NOTE — PROGRESS NOTE ADULT - SUBJECTIVE AND OBJECTIVE BOX
Follow Up:  cdiff    Interval History/ROS:  increased diarrhea with resumption of tube feeds  - currently at 30 cc/hr    Allergies  pineapple (Unknown)  Tagamet (Unknown)  heparin (Unknown)  walnut (Unknown)  metronidazole (Rash)  penicillin (Unknown)  Lyrica (Unknown)  meropenem (Rash)  Pecan, Filbert, Hazelnut (Unknown)        ANTIMICROBIALS:      OTHER MEDS:  MEDICATIONS  (STANDING):  acetaminophen   Oral Liquid .. 650 every 6 hours PRN  albuterol/ipratropium for Nebulization 3 every 6 hours  amLODIPine   Tablet 10 <User Schedule>  escitalopram 10 <User Schedule>  fentaNYL   Patch  25 MICROgram(s)/Hr 1 every 72 hours  gabapentin Solution 100 <User Schedule>  HYDROmorphone  Injectable 1 every 6 hours PRN  HYDROmorphone  Injectable 0.5 every 4 hours PRN  insulin glargine Injectable (LANTUS) 5 <User Schedule>  insulin lispro (ADMELOG) corrective regimen sliding scale  every 6 hours  levothyroxine Injectable 87.5 <User Schedule>  melatonin Liquid 3 at bedtime  ondansetron Injectable 4 every 8 hours PRN  predniSONE  Solution 5 <User Schedule>  QUEtiapine 12.5 <User Schedule>  simethicone 80 every 6 hours      Vital Signs Last 24 Hrs  T(C): 36.8 (18 Apr 2024 18:00), Max: 36.8 (18 Apr 2024 00:00)  T(F): 98.2 (18 Apr 2024 18:00), Max: 98.2 (18 Apr 2024 00:00)  HR: 75 (18 Apr 2024 18:00) (70 - 101)  BP: 120/55 (18 Apr 2024 18:00) (113/67 - 147/74)  BP(mean): --  RR: 19 (18 Apr 2024 18:00) (16 - 20)  SpO2: 99% (18 Apr 2024 18:00) (98% - 100%)    Parameters below as of 18 Apr 2024 18:00  Patient On (Oxygen Delivery Method): ventilator        PHYSICAL EXAM:  General:  NAD, Non-toxic  Neurology: lethargic, nonfocal  Respiratory: Clear to auscultation bilaterally  CV: RRR, S1S2, no murmurs, rubs or gallops  Abdominal: Soft, Non-tender, non-distended,  new PEJ site in place  Extremities: genrealized edema  Line Sites: Clear  Skin: No rash                          8.9    5.54  )-----------( 101      ( 18 Apr 2024 07:11 )             29.5       04-18    140  |  104  |  5<L>  ----------------------------<  142<H>  3.1<L>   |  26  |  <0.30<L>    Ca    9.0      18 Apr 2024 06:51  Phos  2.6     04-18  Mg     1.8     04-18    TPro  5.8<L>  /  Alb  2.4<L>  /  TBili  2.5<H>  /  DBili  x   /  AST  52<H>  /  ALT  32  /  AlkPhos  52  04-18    MICROBIOLOGY:  .Blood Blood-Peripheral  04-15-24   No growth at 72 Hours  --  --      .Blood Blood-Peripheral  04-15-24   No growth at 72 Hours  --  --      Catheterized Catheterized  04-12-24   No growth  --  --      .Blood Blood-Peripheral  04-12-24   No growth at 5 days  --  --      .Blood Blood-Peripheral  04-11-24   No growth at 5 days  --  --      .Blood Blood-Peripheral  04-09-24   No growth at 5 days  --  --      .Sputum Sputum  04-07-24   Mixed gram negative rods Culture includes  Moderate Stenotrophomonas maltophilia  Few Pseudomonas aeruginosa (Carbapenem Resistant)  --  Stenotrophomonas maltophilia  Pseudomonas aeruginosa (Carbapenem Resistant)      .Blood Blood-Peripheral  04-07-24   No growth at 5 days  --  Blood Culture PCR  Serratia marcescens      .Sputum Sputum  04-05-24   Numerous Pseudomonas aeruginosa  Normal Respiratory Shanda present  --  Pseudomonas aeruginosa      .Blood Blood-Peripheral  04-04-24   No growth at 5 days  --  --      Clean Catch Clean Catch (Midstream)  04-03-24   <10,000 CFU/mL Normal Urogenital Shanda  --  --      .Blood Blood-Peripheral  04-02-24   No growth at 5 days  --  --    RADIOLOGY:  < from: CT Abdomen and Pelvis w/ Oral Cont and w/ IV Cont (04.12.24 @ 21:36) >  CHEST:  -Multifocal pneumonia as described above, superimposed on prior findings   of atelectasis. Follow-up to resolution with repeat chest CT in one   month, or sooner as clinically warranted.  -Tracheostomy terminates at the nick, similar to prior imaging. Central   and distal airway secretions and resultant mosaic attenuation of the   aerated lung parenchyma, likely from air trapping.    ABDOMEN/PELVIS:  -Colon is overall nondistended, limiting evaluation. Scattered mild   apparent colonic wall thickening is suggested. Correlate for any signs   and symptoms of persistent colitis.  -Right anterior kidney faint geographic region of low attenuation on   image 138 series 3 is similar to 4/2/2024 CT, of unclear significance.   Correlate for any signs and symptoms of renal infection.  -Gastrostomy tube localized to the stomach. Mild inflammation/thickening   along gastrostomy tube tract is similar to prior imaging. Correlate   clinically.  -Sacrum decubitus ulcer redemonstrated, with increasedskin thickening in   the interim.    < end of copied text >      Zion Newman MD; Division of Infectious Disease; Pager: [phone]; nights and weekends: [phone]

## 2024-04-19 LAB
ALBUMIN SERPL ELPH-MCNC: 2.6 G/DL — LOW (ref 3.3–5)
ALP SERPL-CCNC: 56 U/L — SIGNIFICANT CHANGE UP (ref 40–120)
ALT FLD-CCNC: 35 U/L — SIGNIFICANT CHANGE UP (ref 10–45)
ANION GAP SERPL CALC-SCNC: 8 MMOL/L — SIGNIFICANT CHANGE UP (ref 5–17)
APTT BLD: 32.4 SEC — SIGNIFICANT CHANGE UP (ref 24.5–35.6)
AST SERPL-CCNC: 52 U/L — HIGH (ref 10–40)
BASOPHILS # BLD AUTO: 0 K/UL — SIGNIFICANT CHANGE UP (ref 0–0.2)
BASOPHILS NFR BLD AUTO: 0 % — SIGNIFICANT CHANGE UP (ref 0–2)
BILIRUB SERPL-MCNC: 1.8 MG/DL — HIGH (ref 0.2–1.2)
BUN SERPL-MCNC: 12 MG/DL — SIGNIFICANT CHANGE UP (ref 7–23)
CALCIUM SERPL-MCNC: 9.8 MG/DL — SIGNIFICANT CHANGE UP (ref 8.4–10.5)
CHLORIDE SERPL-SCNC: 107 MMOL/L — SIGNIFICANT CHANGE UP (ref 96–108)
CO2 SERPL-SCNC: 27 MMOL/L — SIGNIFICANT CHANGE UP (ref 22–31)
CREAT SERPL-MCNC: <0.3 MG/DL — LOW (ref 0.5–1.3)
EGFR: 113 ML/MIN/1.73M2 — SIGNIFICANT CHANGE UP
EOSINOPHIL # BLD AUTO: 0.07 K/UL — SIGNIFICANT CHANGE UP (ref 0–0.5)
EOSINOPHIL NFR BLD AUTO: 1.2 % — SIGNIFICANT CHANGE UP (ref 0–6)
FIBRINOGEN AG PPP IA-MCNC: 274 MG/DL — SIGNIFICANT CHANGE UP (ref 233–496)
GLUCOSE BLDC GLUCOMTR-MCNC: 127 MG/DL — HIGH (ref 70–99)
GLUCOSE BLDC GLUCOMTR-MCNC: 169 MG/DL — HIGH (ref 70–99)
GLUCOSE BLDC GLUCOMTR-MCNC: 197 MG/DL — HIGH (ref 70–99)
GLUCOSE BLDC GLUCOMTR-MCNC: 246 MG/DL — HIGH (ref 70–99)
GLUCOSE BLDC GLUCOMTR-MCNC: 273 MG/DL — HIGH (ref 70–99)
GLUCOSE SERPL-MCNC: 136 MG/DL — HIGH (ref 70–99)
HCT VFR BLD CALC: 31.5 % — LOW (ref 34.5–45)
HGB BLD-MCNC: 8.9 G/DL — LOW (ref 11.5–15.5)
INR BLD: 1.3 RATIO — HIGH (ref 0.85–1.18)
LYMPHOCYTES # BLD AUTO: 0.28 K/UL — LOW (ref 1–3.3)
LYMPHOCYTES # BLD AUTO: 4.8 % — LOW (ref 13–44)
MAGNESIUM SERPL-MCNC: 1.8 MG/DL — SIGNIFICANT CHANGE UP (ref 1.6–2.6)
MANUAL SMEAR VERIFICATION: SIGNIFICANT CHANGE UP
MCHC RBC-ENTMCNC: 26.2 PG — LOW (ref 27–34)
MCHC RBC-ENTMCNC: 28.3 GM/DL — LOW (ref 32–36)
MCV RBC AUTO: 92.6 FL — SIGNIFICANT CHANGE UP (ref 80–100)
MONOCYTES # BLD AUTO: 0.21 K/UL — SIGNIFICANT CHANGE UP (ref 0–0.9)
MONOCYTES NFR BLD AUTO: 3.6 % — SIGNIFICANT CHANGE UP (ref 2–14)
NEUTROPHILS # BLD AUTO: 5.26 K/UL — SIGNIFICANT CHANGE UP (ref 1.8–7.4)
NEUTROPHILS NFR BLD AUTO: 88 % — HIGH (ref 43–77)
NEUTS BAND # BLD: 1.2 % — SIGNIFICANT CHANGE UP (ref 0–8)
PHOSPHATE SERPL-MCNC: 2.5 MG/DL — SIGNIFICANT CHANGE UP (ref 2.5–4.5)
PLAT MORPH BLD: NORMAL — SIGNIFICANT CHANGE UP
PLATELET # BLD AUTO: 124 K/UL — LOW (ref 150–400)
POTASSIUM SERPL-MCNC: 4.4 MMOL/L — SIGNIFICANT CHANGE UP (ref 3.5–5.3)
POTASSIUM SERPL-SCNC: 4.4 MMOL/L — SIGNIFICANT CHANGE UP (ref 3.5–5.3)
PROMYELOCYTES # FLD: 1.2 % — HIGH (ref 0–0)
PROT SERPL-MCNC: 6.2 G/DL — SIGNIFICANT CHANGE UP (ref 6–8.3)
PROTHROM AB SERPL-ACNC: 14.2 SEC — HIGH (ref 9.5–13)
RBC # BLD: 3.4 M/UL — LOW (ref 3.8–5.2)
RBC # FLD: 21.7 % — HIGH (ref 10.3–14.5)
RBC BLD AUTO: SIGNIFICANT CHANGE UP
SMUDGE CELLS # BLD: PRESENT — SIGNIFICANT CHANGE UP
SODIUM SERPL-SCNC: 142 MMOL/L — SIGNIFICANT CHANGE UP (ref 135–145)
WBC # BLD: 5.9 K/UL — SIGNIFICANT CHANGE UP (ref 3.8–10.5)
WBC # FLD AUTO: 5.9 K/UL — SIGNIFICANT CHANGE UP (ref 3.8–10.5)

## 2024-04-19 PROCEDURE — 99232 SBSQ HOSP IP/OBS MODERATE 35: CPT

## 2024-04-19 RX ORDER — PREDNISOLONE SODIUM PHOSPHATE 1 %
1 DROPS OPHTHALMIC (EYE)
Refills: 0 | Status: DISCONTINUED | OUTPATIENT
Start: 2024-04-19 | End: 2024-05-01

## 2024-04-19 RX ORDER — KETOROLAC TROMETHAMINE 30 MG/ML
15 SYRINGE (ML) INJECTION ONCE
Refills: 0 | Status: DISCONTINUED | OUTPATIENT
Start: 2024-04-19 | End: 2024-04-19

## 2024-04-19 RX ORDER — QUETIAPINE FUMARATE 200 MG/1
12.5 TABLET, FILM COATED ORAL
Refills: 0 | Status: DISCONTINUED | OUTPATIENT
Start: 2024-04-19 | End: 2024-04-24

## 2024-04-19 RX ORDER — MIDODRINE HYDROCHLORIDE 2.5 MG/1
20 TABLET ORAL ONCE
Refills: 0 | Status: DISCONTINUED | OUTPATIENT
Start: 2024-04-19 | End: 2024-04-19

## 2024-04-19 RX ADMIN — Medication 3 MILLILITER(S): at 05:20

## 2024-04-19 RX ADMIN — Medication 1: at 00:34

## 2024-04-19 RX ADMIN — Medication 3: at 17:57

## 2024-04-19 RX ADMIN — Medication 1 APPLICATION(S): at 22:11

## 2024-04-19 RX ADMIN — Medication 1 DROP(S): at 00:35

## 2024-04-19 RX ADMIN — Medication 1 DROP(S): at 06:16

## 2024-04-19 RX ADMIN — Medication 1 APPLICATION(S): at 22:12

## 2024-04-19 RX ADMIN — LIDOCAINE 1 PATCH: 4 CREAM TOPICAL at 11:00

## 2024-04-19 RX ADMIN — Medication 5 MILLILITER(S): at 06:15

## 2024-04-19 RX ADMIN — Medication 3 MILLILITER(S): at 17:13

## 2024-04-19 RX ADMIN — Medication 500 MILLIGRAM(S): at 11:38

## 2024-04-19 RX ADMIN — Medication 1 DROP(S): at 17:16

## 2024-04-19 RX ADMIN — Medication 87.5 MICROGRAM(S): at 06:14

## 2024-04-19 RX ADMIN — Medication 3 MILLILITER(S): at 23:02

## 2024-04-19 RX ADMIN — Medication 1 APPLICATION(S): at 16:17

## 2024-04-19 RX ADMIN — LIDOCAINE 1 PATCH: 4 CREAM TOPICAL at 07:30

## 2024-04-19 RX ADMIN — Medication 1 APPLICATION(S): at 06:17

## 2024-04-19 RX ADMIN — Medication 1 APPLICATION(S): at 16:15

## 2024-04-19 RX ADMIN — Medication 15 MILLILITER(S): at 11:37

## 2024-04-19 RX ADMIN — ZINC SULFATE TAB 220 MG (50 MG ZINC EQUIVALENT) 220 MILLIGRAM(S): 220 (50 ZN) TAB at 17:16

## 2024-04-19 RX ADMIN — Medication 1: at 11:56

## 2024-04-19 RX ADMIN — Medication 5 MILLILITER(S): at 17:16

## 2024-04-19 RX ADMIN — Medication 2000 UNIT(S): at 11:37

## 2024-04-19 RX ADMIN — CHLORHEXIDINE GLUCONATE 1 APPLICATION(S): 213 SOLUTION TOPICAL at 06:17

## 2024-04-19 RX ADMIN — SIMETHICONE 80 MILLIGRAM(S): 80 TABLET, CHEWABLE ORAL at 00:35

## 2024-04-19 RX ADMIN — Medication 15 MILLIGRAM(S): at 12:48

## 2024-04-19 RX ADMIN — QUETIAPINE FUMARATE 12.5 MILLIGRAM(S): 200 TABLET, FILM COATED ORAL at 16:15

## 2024-04-19 RX ADMIN — CHLORHEXIDINE GLUCONATE 15 MILLILITER(S): 213 SOLUTION TOPICAL at 17:16

## 2024-04-19 RX ADMIN — Medication 5 MILLIGRAM(S): at 14:09

## 2024-04-19 RX ADMIN — LIDOCAINE 1 PATCH: 4 CREAM TOPICAL at 22:12

## 2024-04-19 RX ADMIN — INSULIN GLARGINE 5 UNIT(S): 100 INJECTION, SOLUTION SUBCUTANEOUS at 17:58

## 2024-04-19 RX ADMIN — Medication 3 MILLIGRAM(S): at 22:11

## 2024-04-19 RX ADMIN — Medication 5 MILLILITER(S): at 11:37

## 2024-04-19 RX ADMIN — ESCITALOPRAM OXALATE 10 MILLIGRAM(S): 10 TABLET, FILM COATED ORAL at 17:33

## 2024-04-19 RX ADMIN — GABAPENTIN 100 MILLIGRAM(S): 400 CAPSULE ORAL at 22:11

## 2024-04-19 RX ADMIN — Medication 5 MILLILITER(S): at 00:34

## 2024-04-19 RX ADMIN — Medication 1 DROP(S): at 17:26

## 2024-04-19 RX ADMIN — Medication 125 MILLIGRAM(S): at 06:17

## 2024-04-19 RX ADMIN — AMLODIPINE BESYLATE 10 MILLIGRAM(S): 2.5 TABLET ORAL at 08:56

## 2024-04-19 RX ADMIN — SIMETHICONE 80 MILLIGRAM(S): 80 TABLET, CHEWABLE ORAL at 11:38

## 2024-04-19 RX ADMIN — Medication 1 DROP(S): at 11:38

## 2024-04-19 RX ADMIN — SIMETHICONE 80 MILLIGRAM(S): 80 TABLET, CHEWABLE ORAL at 06:16

## 2024-04-19 RX ADMIN — Medication 125 MILLIGRAM(S): at 17:16

## 2024-04-19 RX ADMIN — GABAPENTIN 100 MILLIGRAM(S): 400 CAPSULE ORAL at 08:56

## 2024-04-19 RX ADMIN — CHLORHEXIDINE GLUCONATE 15 MILLILITER(S): 213 SOLUTION TOPICAL at 06:16

## 2024-04-19 RX ADMIN — PANTOPRAZOLE SODIUM 40 MILLIGRAM(S): 20 TABLET, DELAYED RELEASE ORAL at 11:37

## 2024-04-19 RX ADMIN — SIMETHICONE 80 MILLIGRAM(S): 80 TABLET, CHEWABLE ORAL at 17:15

## 2024-04-19 RX ADMIN — ZINC SULFATE TAB 220 MG (50 MG ZINC EQUIVALENT) 220 MILLIGRAM(S): 220 (50 ZN) TAB at 06:16

## 2024-04-19 RX ADMIN — Medication 3 MILLILITER(S): at 11:11

## 2024-04-19 RX ADMIN — Medication 10 MILLIGRAM(S): at 11:38

## 2024-04-19 NOTE — PROGRESS NOTE ADULT - SUBJECTIVE AND OBJECTIVE BOX
[facility] DEPARTMENT OF OPHTHALMOLOGY  ------------------------------------------------------------------------------  Thomas Beckford MD, PGY-2  Contact: TEAMS  ------------------------------------------------------------------------------    Interval History: No acute events overnight.    MEDICATIONS  (STANDING):  albuterol/ipratropium for Nebulization 3 milliLiter(s) Nebulizer every 6 hours  amLODIPine   Tablet 10 milliGRAM(s) Oral <User Schedule>  artificial tears (preservative free) Ophthalmic Solution 1 Drop(s) Both EYES four times a day  ascorbic acid 500 milliGRAM(s) Oral daily  Biotene Dry Mouth Oral Rinse 5 milliLiter(s) Swish and Spit every 6 hours  chlorhexidine 0.12% Liquid 15 milliLiter(s) Oral Mucosa every 12 hours  chlorhexidine 4% Liquid 1 Application(s) Topical <User Schedule>  erythromycin   Ointment 1 Application(s) Both EYES three times a day  escitalopram 10 milliGRAM(s) Oral <User Schedule>  gabapentin Solution 100 milliGRAM(s) Oral <User Schedule>  insulin glargine Injectable (LANTUS) 5 Unit(s) SubCutaneous <User Schedule>  insulin lispro (ADMELOG) corrective regimen sliding scale   SubCutaneous every 6 hours  lactated ringers. 1000 milliLiter(s) (50 mL/Hr) IV Continuous <Continuous>  levothyroxine Injectable 87.5 MICROGram(s) IV Push <User Schedule>  lidocaine   4% Patch 1 Patch Transdermal at bedtime  lidocaine   4% Patch 1 Patch Transdermal at bedtime  melatonin Liquid 3 milliGRAM(s) Oral at bedtime  multivitamin/minerals/iron Oral Solution (CENTRUM) 15 milliLiter(s) Oral daily  pantoprazole  Injectable 40 milliGRAM(s) IV Push every 12 hours  predniSONE  Solution 5 milliGRAM(s) Oral <User Schedule>  QUEtiapine 12.5 milliGRAM(s) Oral <User Schedule>  simethicone 80 milliGRAM(s) Chew every 6 hours  surgical lubricant sterile 1 Application(s) Topical every 8 hours  vancomycin    Solution 125 milliGRAM(s) Oral every 12 hours    MEDICATIONS  (PRN):  calamine/zinc oxide Lotion 1 Application(s) Topical daily PRN Rash and/or Itching  HYDROmorphone  Injectable 1 milliGRAM(s) IV Push every 6 hours PRN Severe Pain (7 - 10)  HYDROmorphone  Injectable 0.5 milliGRAM(s) IV Push every 4 hours PRN Moderate Pain (4 - 6)  ondansetron Injectable 4 milliGRAM(s) IV Push every 8 hours PRN Nausea and/or Vomiting  sodium chloride 0.65% Nasal 1 Spray(s) Both Nostrils every 12 hours PRN Congestion      VITALS: T(C): 36.5 (04-16-24 @ 05:20)  T(F): 97.7 (04-16-24 @ 05:20), Max: 98.3 (04-15-24 @ 16:32)  HR: 72 (04-16-24 @ 05:20) (70 - 710)  BP: 109/63 (04-16-24 @ 05:20) (102/63 - 153/76)  RR:  (18 - 22)  SpO2:  (97% - 100%)  Wt(kg): --  General: AAO x 3, appropriate mood and affect    Ophthalmology Exam:  Visual acuity (sc): FARAZ 2/2 trach and participation  Pupils: PERRL OU, no APD  Ttono: 18 OU   Extraocular movements (EOMs): Full OU, no pain, no diplopia  Confrontational Visual Field (CVF): FARAZ 2/2 trach and participation  Color Plates: FARAZ 2/2 trach and participation    Pen Light Exam (PLE)  External: Flat OU  Lids/Lashes/Lacrimal Ducts: Stye RUL OD ; Flat OS  Sclera/Conjunctiva: OD: sectoral injection temporal and inferior with temporal pigmented area possibly scleral thinning ; OS: sectoral injection temporal and inferior  Cornea: OD: 2 + SPK ; OS: 2+ SPK   Anterior Chamber: D+F OU    Iris: Flat OU  Lens: PCIOL OD; OS: dense cataract OS

## 2024-04-19 NOTE — PROGRESS NOTE ADULT - SUBJECTIVE AND OBJECTIVE BOX
Patient is a 74y old  Female who presents with a chief complaint of Dislodged G Tube (18 Apr 2024 18:24)      Interval Events:    REVIEW OF SYSTEMS:  [ ] Positive  [ ] All other systems negative  [ ] Unable to assess ROS because ________    Vital Signs Last 24 Hrs  T(C): 37 (04-19-24 @ 06:00), Max: 37 (04-19-24 @ 06:00)  T(F): 98.6 (04-19-24 @ 06:00), Max: 98.6 (04-19-24 @ 06:00)  HR: 85 (04-19-24 @ 06:00) (70 - 92)  BP: 142/79 (04-19-24 @ 06:00) (120/55 - 144/65)  RR: 16 (04-19-24 @ 06:00) (16 - 19)  SpO2: 100% (04-19-24 @ 06:00) (98% - 100%)    PHYSICAL EXAM:  HEENT:   [ ]Tracheostomy:  [ ]Pupils equal  [ ]No oral lesions  [ ]Abnormal    SKIN  [ ]No Rash  [ ] Abnormal  [ ] pressure    CARDIAC  [ ]Regular  [ ]Abnormal    PULMONARY  [ ]Bilateral Clear Breath Sounds  [ ]Normal Excursion  [ ]Abnormal    GI  [ ]PEG      [ ] +BS		              [ ]Soft, nondistended, nontender	  [ ]Abnormal    MUSCULOSKELETAL                                   [ ]Bedbound                 [ ]Abnormal    [ ]Ambulatory/OOB to chair                           EXTREMITIES                                         [ ]Normal  [ ]Edema                           NEUROLOGIC  [ ] Normal, non focal  [ ] Focal findings:    PSYCHIATRIC  [ ]Alert and appropriate  [ ] Sedated	 [ ]Agitated    :  Mcbride: [ ] Yes, if yes: Date of Placement:                   [  ] No    LINES: Central Lines [ ] Yes, if yes: Date of Placement                                     [  ] No    HOSPITAL MEDICATIONS:  MEDICATIONS  (STANDING):  albuterol/ipratropium for Nebulization 3 milliLiter(s) Nebulizer every 6 hours  amLODIPine   Tablet 10 milliGRAM(s) Oral <User Schedule>  artificial tears (preservative free) Ophthalmic Solution 1 Drop(s) Both EYES four times a day  ascorbic acid 500 milliGRAM(s) Oral daily  Biotene Dry Mouth Oral Rinse 5 milliLiter(s) Swish and Spit every 6 hours  chlorhexidine 0.12% Liquid 15 milliLiter(s) Oral Mucosa every 12 hours  chlorhexidine 4% Liquid 1 Application(s) Topical <User Schedule>  cholecalciferol 2000 Unit(s) Oral daily  erythromycin   Ointment 1 Application(s) Both EYES three times a day  escitalopram 10 milliGRAM(s) Oral <User Schedule>  fentaNYL   Patch  25 MICROgram(s)/Hr 1 Patch Transdermal every 72 hours  gabapentin Solution 100 milliGRAM(s) Oral <User Schedule>  insulin glargine Injectable (LANTUS) 5 Unit(s) SubCutaneous <User Schedule>  insulin lispro (ADMELOG) corrective regimen sliding scale   SubCutaneous every 6 hours  levothyroxine Injectable 87.5 MICROGram(s) IV Push <User Schedule>  lidocaine   4% Patch 1 Patch Transdermal at bedtime  lidocaine   4% Patch 1 Patch Transdermal at bedtime  melatonin Liquid 3 milliGRAM(s) Oral at bedtime  multivitamin/minerals/iron Oral Solution (CENTRUM) 15 milliLiter(s) Oral daily  pantoprazole  Injectable 40 milliGRAM(s) IV Push daily  phytonadione   Solution 10 milliGRAM(s) Oral daily  predniSONE  Solution 5 milliGRAM(s) Oral <User Schedule>  QUEtiapine 12.5 milliGRAM(s) Oral <User Schedule>  simethicone 80 milliGRAM(s) Chew every 6 hours  surgical lubricant sterile 1 Application(s) Topical every 8 hours  vancomycin    Solution 125 milliGRAM(s) Enteral Tube every 12 hours  zinc sulfate 220 milliGRAM(s) Oral two times a day    MEDICATIONS  (PRN):  acetaminophen   Oral Liquid .. 650 milliGRAM(s) Oral every 6 hours PRN Mild Pain (1 - 3)  calamine/zinc oxide Lotion 1 Application(s) Topical daily PRN Rash and/or Itching  HYDROmorphone  Injectable 0.5 milliGRAM(s) IV Push every 4 hours PRN Moderate Pain (4 - 6)  HYDROmorphone  Injectable 1 milliGRAM(s) IV Push every 6 hours PRN Severe Pain (7 - 10)  ondansetron Injectable 4 milliGRAM(s) IV Push every 8 hours PRN Nausea and/or Vomiting  sodium chloride 0.65% Nasal 1 Spray(s) Both Nostrils every 12 hours PRN Congestion      LABS:                        8.9    5.90  )-----------( 124      ( 19 Apr 2024 06:50 )             31.5     04-18    140  |  104  |  5<L>  ----------------------------<  142<H>  3.1<L>   |  26  |  <0.30<L>    Ca    9.0      18 Apr 2024 06:51  Phos  2.6     04-18  Mg     1.8     04-18    TPro  5.8<L>  /  Alb  2.4<L>  /  TBili  2.5<H>  /  DBili  x   /  AST  52<H>  /  ALT  32  /  AlkPhos  52  04-18    PT/INR - ( 18 Apr 2024 07:07 )   PT: 15.4 sec;   INR: 1.48 ratio         PTT - ( 18 Apr 2024 07:07 )  PTT:31.9 sec  Urinalysis Basic - ( 18 Apr 2024 06:51 )    Color: x / Appearance: x / SG: x / pH: x  Gluc: 142 mg/dL / Ketone: x  / Bili: x / Urobili: x   Blood: x / Protein: x / Nitrite: x   Leuk Esterase: x / RBC: x / WBC x   Sq Epi: x / Non Sq Epi: x / Bacteria: x          CAPILLARY BLOOD GLUCOSE    MICROBIOLOGY:     RADIOLOGY:  [ ] Reviewed and interpreted by me    Mode: AC/ CMV (Assist Control/ Continuous Mandatory Ventilation)  RR (machine): 12  TV (machine): 350  FiO2: 30  PEEP: 5  ITime: 1  MAP: 8  PIP: 28   Patient is a 74y old  Female who presents with a chief complaint of Dislodged G Tube (18 Apr 2024 18:24)      Interval Events: Increased drainage from old PEG stoma.  Daughter requested feeds to be held at 40ml/hr.    REVIEW OF SYSTEMS:  [X] Positive:  Stomach pain, "pain in butt"  [ ] All other systems negative  [ ] Unable to assess ROS because ___    Vital Signs Last 24 Hrs  T(C): 37 (04-19-24 @ 06:00), Max: 37 (04-19-24 @ 06:00)  T(F): 98.6 (04-19-24 @ 06:00), Max: 98.6 (04-19-24 @ 06:00)  HR: 85 (04-19-24 @ 06:00) (70 - 92)  BP: 142/79 (04-19-24 @ 06:00) (120/55 - 144/65)  RR: 16 (04-19-24 @ 06:00) (16 - 19)  SpO2: 100% (04-19-24 @ 06:00) (98% - 100%)        PHYSICAL EXAM:  HEENT:   [X] Tracheostomy:  #8 distal XLT Cuffed Shiley  [X] PERRL B/L; EOMI; eyes red L>R  [X] No oral lesions  [ ] Abnormal    SKIN  [ ] No Rash  [ ] Abnormal  [X] pressure: B/L buttocks/sacral deep tissue injury     CARDIAC  [X] Regular  [ ]Abnormal    PULMONARY  [X] Bilateral Clear Breath Sounds  [ ] Normal Excursion  [ ] Abnormal    GI  [X] PEG-J      [X] +BS		              [X] Soft, nondistended, nontender	  [X] Abnormal:  Original PEG stoma with mild amount of clear viscous discharge, site otherwise clean without erythema or purulence    MUSCULOSKELETAL                                   [X] Bedbound                 [ ] Abnormal    [ ] Ambulatory/OOB to chair                           EXTREMITIES                                         [X] Normal  [ ] Edema                       NEUROLOGIC  [ ] Normal, non focal  [X] Focal findings:  Alert and Oriented x3; responds to questions by mouthing words; +gag reflex/cough; no facial asymmetry observed; moves all distal limbs upon request; B/L plantar flexion    PSYCHIATRIC  [X] Lethargic but arousable, affect appropriate  [ ] Sedated	 [ ]Agitated    :  Mcbride: [ ] Yes, if yes: Date of Placement:                   [X ] No    LINES: Central Lines [ ] Yes, if yes: Date of Placement                                     [X] No    HOSPITAL MEDICATIONS:  MEDICATIONS  (STANDING):  albuterol/ipratropium for Nebulization 3 milliLiter(s) Nebulizer every 6 hours  amLODIPine   Tablet 10 milliGRAM(s) Oral <User Schedule>  artificial tears (preservative free) Ophthalmic Solution 1 Drop(s) Both EYES four times a day  ascorbic acid 500 milliGRAM(s) Oral daily  Biotene Dry Mouth Oral Rinse 5 milliLiter(s) Swish and Spit every 6 hours  chlorhexidine 0.12% Liquid 15 milliLiter(s) Oral Mucosa every 12 hours  chlorhexidine 4% Liquid 1 Application(s) Topical <User Schedule>  cholecalciferol 2000 Unit(s) Oral daily  erythromycin   Ointment 1 Application(s) Both EYES three times a day  escitalopram 10 milliGRAM(s) Oral <User Schedule>  fentaNYL   Patch  25 MICROgram(s)/Hr 1 Patch Transdermal every 72 hours  gabapentin Solution 100 milliGRAM(s) Oral <User Schedule>  insulin glargine Injectable (LANTUS) 5 Unit(s) SubCutaneous <User Schedule>  insulin lispro (ADMELOG) corrective regimen sliding scale   SubCutaneous every 6 hours  levothyroxine Injectable 87.5 MICROGram(s) IV Push <User Schedule>  lidocaine   4% Patch 1 Patch Transdermal at bedtime  lidocaine   4% Patch 1 Patch Transdermal at bedtime  melatonin Liquid 3 milliGRAM(s) Oral at bedtime  multivitamin/minerals/iron Oral Solution (CENTRUM) 15 milliLiter(s) Oral daily  pantoprazole  Injectable 40 milliGRAM(s) IV Push daily  phytonadione   Solution 10 milliGRAM(s) Oral daily  predniSONE  Solution 5 milliGRAM(s) Oral <User Schedule>  QUEtiapine 12.5 milliGRAM(s) Oral <User Schedule>  simethicone 80 milliGRAM(s) Chew every 6 hours  surgical lubricant sterile 1 Application(s) Topical every 8 hours  vancomycin    Solution 125 milliGRAM(s) Enteral Tube every 12 hours  zinc sulfate 220 milliGRAM(s) Oral two times a day    MEDICATIONS  (PRN):  acetaminophen   Oral Liquid .. 650 milliGRAM(s) Oral every 6 hours PRN Mild Pain (1 - 3)  calamine/zinc oxide Lotion 1 Application(s) Topical daily PRN Rash and/or Itching  HYDROmorphone  Injectable 0.5 milliGRAM(s) IV Push every 4 hours PRN Moderate Pain (4 - 6)  HYDROmorphone  Injectable 1 milliGRAM(s) IV Push every 6 hours PRN Severe Pain (7 - 10)  ondansetron Injectable 4 milliGRAM(s) IV Push every 8 hours PRN Nausea and/or Vomiting  sodium chloride 0.65% Nasal 1 Spray(s) Both Nostrils every 12 hours PRN Congestion      LABS:                        8.9    5.90  )-----------( 124      ( 19 Apr 2024 06:50 )             31.5     04-18    140  |  104  |  5<L>  ----------------------------<  142<H>  3.1<L>   |  26  |  <0.30<L>    Ca    9.0      18 Apr 2024 06:51  Phos  2.6     04-18  Mg     1.8     04-18    TPro  5.8<L>  /  Alb  2.4<L>  /  TBili  2.5<H>  /  DBili  x   /  AST  52<H>  /  ALT  32  /  AlkPhos  52  04-18    PT/INR - ( 18 Apr 2024 07:07 )   PT: 15.4 sec;   INR: 1.48 ratio         PTT - ( 18 Apr 2024 07:07 )  PTT:31.9 sec  Urinalysis Basic - ( 18 Apr 2024 06:51 )    Color: x / Appearance: x / SG: x / pH: x  Gluc: 142 mg/dL / Ketone: x  / Bili: x / Urobili: x   Blood: x / Protein: x / Nitrite: x   Leuk Esterase: x / RBC: x / WBC x   Sq Epi: x / Non Sq Epi: x / Bacteria: x          CAPILLARY BLOOD GLUCOSE    MICROBIOLOGY:     RADIOLOGY:  [ ] Reviewed and interpreted by me    Mode: AC/ CMV (Assist Control/ Continuous Mandatory Ventilation)  RR (machine): 12  TV (machine): 350  FiO2: 30  PEEP: 5  ITime: 1  MAP: 8  PIP: 28   Patient is a 74y old  Female who presents with a chief complaint of Dislodged G Tube (18 Apr 2024 18:24)      Interval Events: Increased drainage from old PEG stoma.  Daughter requested feeds to be held at 40ml/hr.    REVIEW OF SYSTEMS:  [X] Positive:  Stomach pain, "pain in butt"  [ ] All other systems negative  [ ] Unable to assess ROS because ___    Vital Signs Last 24 Hrs  T(C): 37 (04-19-24 @ 06:00), Max: 37 (04-19-24 @ 06:00)  T(F): 98.6 (04-19-24 @ 06:00), Max: 98.6 (04-19-24 @ 06:00)  HR: 85 (04-19-24 @ 06:00) (70 - 92)  BP: 142/79 (04-19-24 @ 06:00) (120/55 - 144/65)  RR: 16 (04-19-24 @ 06:00) (16 - 19)  SpO2: 100% (04-19-24 @ 06:00) (98% - 100%)    PHYSICAL EXAM:  HEENT:   [X] Tracheostomy:  #8 distal XLT Cuffed Shiley  [X] PERRL B/L; EOMI; eyes red L>R  [X] No oral lesions  [ ] Abnormal    SKIN  [ ] No Rash  [ ] Abnormal  [X] pressure: B/L buttocks/sacral deep tissue injury     CARDIAC  [X] Regular  [ ]Abnormal    PULMONARY  [X] Bilateral Clear Breath Sounds  [ ] Normal Excursion  [ ] Abnormal    GI  [X] PEG-J      [X] +BS		              [X] Soft, nondistended, nontender	  [X] Abnormal:  Original PEG stoma with mild amount of clear viscous discharge, site otherwise clean without erythema or purulence    MUSCULOSKELETAL                                   [X] Bedbound                 [ ] Abnormal    [ ] Ambulatory/OOB to chair                           EXTREMITIES                                         [X] Normal  [ ] Edema                       NEUROLOGIC  [ ] Normal, non focal  [X] Focal findings:  Alert and Oriented x3; responds to questions by mouthing words; +gag reflex/cough; no facial asymmetry observed; moves all distal limbs upon request; B/L plantar flexion    PSYCHIATRIC  [X] Lethargic but arousable, affect appropriate  [ ] Sedated	 [ ]Agitated    :  Mcbride: [ ] Yes, if yes: Date of Placement:                   [X ] No    LINES: Central Lines [ ] Yes, if yes: Date of Placement                                     [X] No    HOSPITAL MEDICATIONS:  MEDICATIONS  (STANDING):  albuterol/ipratropium for Nebulization 3 milliLiter(s) Nebulizer every 6 hours  amLODIPine   Tablet 10 milliGRAM(s) Oral <User Schedule>  artificial tears (preservative free) Ophthalmic Solution 1 Drop(s) Both EYES four times a day  ascorbic acid 500 milliGRAM(s) Oral daily  Biotene Dry Mouth Oral Rinse 5 milliLiter(s) Swish and Spit every 6 hours  chlorhexidine 0.12% Liquid 15 milliLiter(s) Oral Mucosa every 12 hours  chlorhexidine 4% Liquid 1 Application(s) Topical <User Schedule>  cholecalciferol 2000 Unit(s) Oral daily  erythromycin   Ointment 1 Application(s) Both EYES three times a day  escitalopram 10 milliGRAM(s) Oral <User Schedule>  fentaNYL   Patch  25 MICROgram(s)/Hr 1 Patch Transdermal every 72 hours  gabapentin Solution 100 milliGRAM(s) Oral <User Schedule>  insulin glargine Injectable (LANTUS) 5 Unit(s) SubCutaneous <User Schedule>  insulin lispro (ADMELOG) corrective regimen sliding scale   SubCutaneous every 6 hours  levothyroxine Injectable 87.5 MICROGram(s) IV Push <User Schedule>  lidocaine   4% Patch 1 Patch Transdermal at bedtime  lidocaine   4% Patch 1 Patch Transdermal at bedtime  melatonin Liquid 3 milliGRAM(s) Oral at bedtime  multivitamin/minerals/iron Oral Solution (CENTRUM) 15 milliLiter(s) Oral daily  pantoprazole  Injectable 40 milliGRAM(s) IV Push daily  phytonadione   Solution 10 milliGRAM(s) Oral daily  predniSONE  Solution 5 milliGRAM(s) Oral <User Schedule>  QUEtiapine 12.5 milliGRAM(s) Oral <User Schedule>  simethicone 80 milliGRAM(s) Chew every 6 hours  surgical lubricant sterile 1 Application(s) Topical every 8 hours  vancomycin    Solution 125 milliGRAM(s) Enteral Tube every 12 hours  zinc sulfate 220 milliGRAM(s) Oral two times a day    MEDICATIONS  (PRN):  acetaminophen   Oral Liquid .. 650 milliGRAM(s) Oral every 6 hours PRN Mild Pain (1 - 3)  calamine/zinc oxide Lotion 1 Application(s) Topical daily PRN Rash and/or Itching  HYDROmorphone  Injectable 0.5 milliGRAM(s) IV Push every 4 hours PRN Moderate Pain (4 - 6)  HYDROmorphone  Injectable 1 milliGRAM(s) IV Push every 6 hours PRN Severe Pain (7 - 10)  ondansetron Injectable 4 milliGRAM(s) IV Push every 8 hours PRN Nausea and/or Vomiting  sodium chloride 0.65% Nasal 1 Spray(s) Both Nostrils every 12 hours PRN Congestion      LABS:                        8.9    5.90  )-----------( 124      ( 19 Apr 2024 06:50 )             31.5     04-18    140  |  104  |  5<L>  ----------------------------<  142<H>  3.1<L>   |  26  |  <0.30<L>    Ca    9.0      18 Apr 2024 06:51  Phos  2.6     04-18  Mg     1.8     04-18    TPro  5.8<L>  /  Alb  2.4<L>  /  TBili  2.5<H>  /  DBili  x   /  AST  52<H>  /  ALT  32  /  AlkPhos  52  04-18    PT/INR - ( 18 Apr 2024 07:07 )   PT: 15.4 sec;   INR: 1.48 ratio         PTT - ( 18 Apr 2024 07:07 )  PTT:31.9 sec  Urinalysis Basic - ( 18 Apr 2024 06:51 )    Color: x / Appearance: x / SG: x / pH: x  Gluc: 142 mg/dL / Ketone: x  / Bili: x / Urobili: x   Blood: x / Protein: x / Nitrite: x   Leuk Esterase: x / RBC: x / WBC x   Sq Epi: x / Non Sq Epi: x / Bacteria: x          CAPILLARY BLOOD GLUCOSE    MICROBIOLOGY:     RADIOLOGY:  [ ] Reviewed and interpreted by me    Mode: AC/ CMV (Assist Control/ Continuous Mandatory Ventilation)  RR (machine): 12  TV (machine): 350  FiO2: 30  PEEP: 5  ITime: 1  MAP: 8  PIP: 28

## 2024-04-19 NOTE — PROGRESS NOTE ADULT - PROBLEM SELECTOR PROBLEM 7
[facility]    Subjective:     Chief Complaint:   Chief Complaint   Patient presents with    Neck - Pain     Patient is here with complaints of Neck, pain does not radiate.     Left Hip - Pain      bilateral hip pain, Right hip is worse, has groin pain on the left    Right Hip - Pain       Past Medical History:   Diagnosis Date    Hypertension     Wears partial dentures     bottom partials       Past Surgical History:   Procedure Laterality Date    COLPORRHAPHY N/A 02/21/2019    Procedure: COLPORRHAPHY;  Surgeon: Rafael Guevara MD;  Location: [facility] OR;  Service: OB/GYN;  Laterality: N/A;    CYSTOSCOPY N/A 02/21/2019    Procedure: CYSTOSCOPY;  Surgeon: Rafael Guevara MD;  Location: [facility] OR;  Service: OB/GYN;  Laterality: N/A;    HYSTERECTOMY      MOUTH SURGERY  03/2018     second one 10/2018    SACROSPINOUS LIGAMENT FIXATION  02/21/2019    Procedure: FIXATION, LIGAMENT, SACROSPINOUS;  Surgeon: Rafael Guevara MD;  Location: [facility] OR;  Service: OB/GYN;;    STOMACH SURGERY      TOTAL VAGINAL HYSTERECTOMY N/A 02/21/2019    Procedure: HYSTERECTOMY, TOTAL, VAGINAL;  Surgeon: Rafael Guevara MD;  Location: [facility] OR;  Service: OB/GYN;  Laterality: N/A;       Current Outpatient Medications   Medication Sig    cholecalciferol, vitamin D3, (VITAMIN D3 ORAL) Take by mouth.    levothyroxine (SYNTHROID) 25 MCG tablet TAKE (1) TABLET BY MOUTH ONCE DAILY.    ramipriL (ALTACE) 2.5 MG capsule Take 1 capsule (2.5 mg total) by mouth once daily.    CRESTOR 10 mg tablet TAKE 1 TABLET ONCE DAILY INTHE EVENING (Patient not taking: Reported on 7/14/2022)    HYDROcodone-acetaminophen (NORCO) 7.5-325 mg per tablet     ibuprofen (ADVIL,MOTRIN) 600 MG tablet      No current facility-administered medications for this visit.       Review of patient's allergies indicates:  No Known Allergies    No family history on file.    Social History     Socioeconomic History    Marital status:    Tobacco Use    Smoking status: Never     Smokeless tobacco: Never   Substance and Sexual Activity    Alcohol use: Yes     Alcohol/week: 2.0 standard drinks     Types: 2 Glasses of wine per week    Drug use: No    Sexual activity: Not Currently       History of present illness:  Patient comes in today for the bilateral hips and the neck.  She has had longstanding back pain and back problems.  She has had epidurals in the past.  Unfortunately her physician at work is changed and she is unable to access those services.  Her biggest complaint is back and hip pain.  She is also very tender over the trochanteric region she also has neck      Review of Systems:    Constitution: Negative for chills, fever, and sweats.  Negative for unexplained weight loss.    HENT:  Negative for headaches and blurry vision.    Cardiovascular:Negative for chest pain or irregular heart beat. Negative for hypertension.    Respiratory:  Negative for cough and shortness of breath.    Gastrointestinal: Negative for abdominal pain, heartburn, melena, nausea, and vomitting.    Genitourinary:  Negative bladder incontinence and dysuria.    Musculoskeletal:  See HPI for details.     Neurological: Negative for numbness.    Psychiatric/Behavioral: Negative for depression.  The patient is not nervous/anxious.      Endocrine: Negative for polyuria    Hematologic/Lymphatic: Negative for bleeding problem.  Does not bruise/bleed easily.    Skin: Negative for poor would healing and rash    Objective:      Physical Examination:    Vital Signs:  There were no vitals filed for this visit.    Body mass index is 24.79 kg/m².    This a well-developed, well nourished patient in no acute distress.  They are alert and oriented and cooperative to examination.        Patient has full range of motion of her cervical spine.  She has remarkably full range of motion lumbar spine negative straight leg raises EHL is intact deep tendon reflexes are intact.  She is very tender over the paraspinal region.  She is very  tender over the trochanteric region.  She has pain with motion of the cervical and lumbar spine.  Sensation is preserved in lower extremities and the upper extremities her rotator cuffs are grossly intact and strong.  Pertinent New Results:    XRAY Report / Interpretation  AP pelvis demonstrates moderate left hip osteoarthritis with some spurring on the femoral head.    AP and lateral lumbar spine demonstrates severe degenerative changes around a congenital scoliosis.    AP and lateral the cervical spine demonstrates moderate degenerative changes of the mid cervical segments  Assessment/Plan:      Lumbar degenerative cervical degenerative right trochanteric bursitis left trochanteric bursitis.  I injected both trochanteric bursae with Kenalog and lidocaine.  I have ordered an MRI of her lumbar spine.  I have referred her to Dr. Prather for evaluation and treatment.  She will follow-up with me p.r.n.      This note was created using Dragon voice recognition software that occasionally misinterpreted phrases or words.           Thrombocytopenia

## 2024-04-19 NOTE — PROGRESS NOTE ADULT - ASSESSMENT
73 yo F PMH T2DM on insulin, HTN, HLD, hypothyroidism, RA on Prednisone, Fibromyalgia, cerebral aneurysm s/p repair, chronic hypercapnic respiratory failure s/p trach (vent dependent) and Chronic PEG 2022, recurrent C. diff infection (follows with Dr. Newman) , bedbound who presented from home with G tube dislodgement and redness around the site. Had CT Abdomen/Pelvis done showing dislodgement of G tube with balloon in the anterior abdominal wall with air filled track to stomach. Admitted to MICU for ventilator management and given vancomycin/meropenem empirically for treatment of abd wall cellulitis. Per family patient has had Known c diff infection for past 2-3 weeks.  and was being Treated with Fidaxomicin.  IR team exchanged PEG on 4/3 however later in the day it remained with leakage.  IR Attending adjusted PEG at bedside on 4/4 and PEG remained with moderate amount of PEG leakage once feeds initiated at reduced rate.  GI team re-consulted as second opinion for PEG management.  On 4/9 PEG was found out of place, a emerson catheter was placed in the stoma to maintain patency.  Patient was planned for PEG revision with both Surgery and GI team involved on 4/11 however patient had fevers up to 102F and procedure was cancelled for infectious work-up.  An 18 Fr PEG tube placed at bedside on 4/12 (original PEG size was 20Fr) as stoma site appears to have closed. Antibiotics were switched from Meropenem to Cefepime, completed 4/14. Patient S/p PEG-J and closure of gastric fistula with GI and surgery in endo suite on 4/16.      4/17: Patient S/p G-J Tube placement and closure of gastric fistula yesterday by GI and Surgery. Patient evaluated at bedside with Surgery  this morning and case d/w  will initiate feeds today @ 20 cc/hr. Heme recommendations appreciated fibrinogen sent           (Results pending), B12 and Folate ordered for the morning.   4/18:  Tolerating feeds via PEG-J thus far, slowly advancing to goal rate throughout the day.  Daughter concerned patient may be having rheumatic flare given arthralgias and hand swelling.  As high dose steroids would impair surgical wound healing will defer to one time use of Toradol 15mg for acute flare.   PS weaning as tolerated. 75 yo F PMH T2DM on insulin, HTN, HLD, hypothyroidism, RA on Prednisone, Fibromyalgia, cerebral aneurysm s/p repair, chronic hypercapnic respiratory failure s/p trach (vent dependent) and Chronic PEG 2022, recurrent C. diff infection (follows with Dr. Newman) , bedbound who presented from home with G tube dislodgement and redness around the site. Had CT Abdomen/Pelvis done showing dislodgement of G tube with balloon in the anterior abdominal wall with air filled track to stomach. Admitted to MICU for ventilator management and given vancomycin/meropenem empirically for treatment of abd wall cellulitis. Per family patient has had Known c diff infection for past 2-3 weeks.  and was being Treated with Fidaxomicin.  IR team exchanged PEG on 4/3 however later in the day it remained with leakage.  IR Attending adjusted PEG at bedside on 4/4 and PEG remained with moderate amount of PEG leakage once feeds initiated at reduced rate.  GI team re-consulted as second opinion for PEG management.  On 4/9 PEG was found out of place, a emerson catheter was placed in the stoma to maintain patency.  Patient was planned for PEG revision with both Surgery and GI team involved on 4/11 however patient had fevers up to 102F and procedure was cancelled for infectious work-up.  An 18 Fr PEG tube placed at bedside on 4/12 (original PEG size was 20Fr) as stoma site appears to have closed. Antibiotics were switched from Meropenem to Cefepime, completed 4/14. Patient S/p PEG-J and closure of gastric fistula with GI and surgery in endo suite on 4/16.      4/17: Patient S/p G-J Tube placement and closure of gastric fistula yesterday by GI and Surgery. Patient evaluated at bedside with Surgery  this morning and case d/w  will initiate feeds today @ 20 cc/hr. Heme recommendations appreciated fibrinogen sent (Results pending), B12 and Folate ordered for the morning.   4/18:  Tolerating feeds via PEG-J thus far, slowly advancing to goal rate throughout the day.  Daughter concerned patient may be having rheumatic flare given arthralgias and hand swelling.  As high dose steroids would impair surgical wound healing will defer to one time use of Toradol 15mg for acute flare.   PS weaning as tolerated.  4/19: Patient with increased discharge from original PEG stoma over night.  Site observed this morning, with mild clear viscous discharge, otherwise site clean.  Hand joint pain and swelling appears  improved compared to yesterday. Will give another dose of toradol to control flare.

## 2024-04-19 NOTE — PROGRESS NOTE ADULT - ASSESSMENT
74F s/p  EGD-assisted sutured closure of gastrocutaneous fistula, PEG-J placement 4/16/2024, s/p silver nitrate treatment of hypergranulation tissue at the gastrocutaneous fistula wound on 4/18.    Plan/Recs  -Continue tube feeds via feeding port of PEG-J as tolerated  -Continue dry dressings daily    ACS/Trauma Surgery  [id number]

## 2024-04-19 NOTE — PROGRESS NOTE ADULT - ASSESSMENT
73 yo F PMH T2DM on insulin, HTN, HLD, hypothyroidism, RA on Prednisone, Fibromyalgia, cerebral aneurysm s/p repair, acute hypercapnic respiratory failure s/p trach (vent dependent) and PEG (10/22), bedbound presented from home with complaints of possible G tube dislodgement and redness around the site, admitted for further work up, found to be bacteremic with serratia marcescens during admission, consulted for rule out endophthalmitis and bilateral scleral injection.    # Rule out endophthalmitis   - 74F with above medical hx was found to be bacteremic with serratia marcescens, denying blurry vision, only reporting burning of both eyes   - FARAZ VA 2/2 trach, IOP wnl OU, no rAPD OU, EOMs full  - Anterior exam with no hypopion, DFE with no signs of vitritis OU  - No concern for endophthalmitis at this time    # anterior scleritis OU   - Pt with hx of RA and sjogrens has bilateral scleral injection that is sectoral, no signs of conjunctivitis   - likely secondary to anterior scleritis in setting of autoimmune disease   - Start erythromycin ointment to both eyes TID (with last application of the day being at bedtime) - ordered  - Start preservative free artificial tears QID OU - ordered  - STOP home tobramycin gtt - no signs of active bacterial infection (discontinued)  - recommend rheumatology consult as anterior scleritis is sign of active RA  - Start predforte drops to both eyes 4 times daily (ordered)  - Ophtho to follow    Seen and discussed with Dr. Soria    Outpatient Follow-up: Patient should follow-up with his/her ophthalmologist or with [facility] Department of Ophthalmology within 1 week of after discharge at:    [address]  [place] [address]  [phone]

## 2024-04-19 NOTE — PROGRESS NOTE ADULT - NS ATTEND AMEND GEN_ALL_CORE FT
75 y/o F w/chronic respiratory failure with hypoxia and hypercapnia s/p trach/PEG (vent dependent), RA on prednisone, DM, cerebral aneurysm s/p repair, and recurrent C. Diff infection admitted for dislodged PEG tube. Now s/p replacement of G-J tube.    - Continue mechanical ventilation  - Abx as per ID  - DVT prophylaxis  - DNR/DNI  - Dispo planning 73 y/o F w/chronic respiratory failure with hypoxia and hypercapnia s/p trach/PEG (vent dependent), RA on prednisone, DM, cerebral aneurysm s/p repair, and recurrent C. Diff infection admitted for dislodged PEG tube. Now s/p replacement of G-J tube. I spoke with the patient about discharge planning. The patient is awake and able to communicate. It is unlikely that the patient is able to comprehend all of the complexities of home discharge vs discharge to a facility, however her preference is for home discharge.    - Continue mechanical ventilation  - Abx as per ID  - DVT prophylaxis  - DNR/DNI  - Dispo planning

## 2024-04-19 NOTE — PROGRESS NOTE ADULT - SUBJECTIVE AND OBJECTIVE BOX
Follow Up:  c diff    Interval History/ROS:  lethargic    Allergies  pineapple (Unknown)  Tagamet (Unknown)  heparin (Unknown)  walnut (Unknown)  metronidazole (Rash)  penicillin (Unknown)  Lyrica (Unknown)  meropenem (Rash)  Andressa Rawls, Hazelnut (Unknown)    ANTIMICROBIALS:  vancomycin    Solution 125 every 12 hours      OTHER MEDS:  MEDICATIONS  (STANDING):  acetaminophen   Oral Liquid .. 650 every 6 hours PRN  albuterol/ipratropium for Nebulization 3 every 6 hours  amLODIPine   Tablet 10 <User Schedule>  escitalopram 10 <User Schedule>  fentaNYL   Patch  25 MICROgram(s)/Hr 1 every 72 hours  gabapentin Solution 100 <User Schedule>  HYDROmorphone  Injectable 0.5 every 4 hours PRN  HYDROmorphone  Injectable 1 every 6 hours PRN  insulin glargine Injectable (LANTUS) 5 <User Schedule>  insulin lispro (ADMELOG) corrective regimen sliding scale  every 6 hours  ketorolac   Injectable 15 once  levothyroxine Injectable 87.5 <User Schedule>  melatonin Liquid 3 at bedtime  ondansetron Injectable 4 every 8 hours PRN  pantoprazole  Injectable 40 daily  predniSONE  Solution 5 <User Schedule>  QUEtiapine 12.5 <User Schedule>  simethicone 80 every 6 hours      Vital Signs Last 24 Hrs  T(C): 36.8 (19 Apr 2024 09:00), Max: 37 (19 Apr 2024 06:00)  T(F): 98.3 (19 Apr 2024 09:00), Max: 98.6 (19 Apr 2024 06:00)  HR: 87 (19 Apr 2024 11:17) (70 - 95)  BP: 140/67 (19 Apr 2024 09:00) (120/55 - 144/65)  BP(mean): --  RR: 16 (19 Apr 2024 09:00) (16 - 19)  SpO2: 99% (19 Apr 2024 11:17) (99% - 100%)    Parameters below as of 19 Apr 2024 11:17  Patient On (Oxygen Delivery Method): ventilator        PHYSICAL EXAM:  General:  NAD, Non-toxic  Neurology: lethargic  Respiratory: Clear to auscultation bilaterally  CV: RRR, S1S2, no murmurs, rubs or gallops  Abdominal: Soft, Non-tender, moderately distended  Extremities: genrealized edema,   Line Sites: Clear  Skin: No rash                          8.9    5.90  )-----------( 124      ( 19 Apr 2024 06:50 )             31.5       04-19    142  |  107  |  12  ----------------------------<  136<H>  4.4   |  27  |  <0.30<L>    Ca    9.8      19 Apr 2024 06:49  Phos  2.5     04-19  Mg     1.8     04-19    TPro  6.2  /  Alb  2.6<L>  /  TBili  1.8<H>  /  DBili  x   /  AST  52<H>  /  ALT  35  /  AlkPhos  56  04-19      MICROBIOLOGY:  .Blood Blood-Peripheral  04-15-24   No growth at 72 Hours  --  --      .Blood Blood-Peripheral  04-15-24   No growth at 72 Hours  --  --      Catheterized Catheterized  04-12-24   No growth  --  --      .Blood Blood-Peripheral  04-12-24   No growth at 5 days  --  --      .Blood Blood-Peripheral  04-11-24   No growth at 5 days  --  --      .Blood Blood-Peripheral  04-09-24   No growth at 5 days  --  --      .Sputum Sputum  04-07-24   Mixed gram negative rods Culture includes  Moderate Stenotrophomonas maltophilia  Few Pseudomonas aeruginosa (Carbapenem Resistant)  --  Stenotrophomonas maltophilia  Pseudomonas aeruginosa (Carbapenem Resistant)      .Blood Blood-Peripheral  04-07-24   No growth at 5 days  --  Blood Culture PCR  Serratia marcescens      .Sputum Sputum  04-05-24   Numerous Pseudomonas aeruginosa  Normal Respiratory Shanda present  --  Pseudomonas aeruginosa      .Blood Blood-Peripheral  04-04-24   No growth at 5 days  --  --      Clean Catch Clean Catch (Midstream)  04-03-24   <10,000 CFU/mL Normal Urogenital Shanda  --  --      .Blood Blood-Peripheral  04-02-24   No growth at 5 days  --  --    RADIOLOGY:  < from: CT Abdomen and Pelvis w/ Oral Cont and w/ IV Cont (04.12.24 @ 21:36) >  IMPRESSION:    CHEST:  -Multifocal pneumonia as described above, superimposed on prior findings   of atelectasis. Follow-up to resolution with repeat chest CT in one   month, or sooner as clinically warranted.  -Tracheostomy terminates at the nick, similar to prior imaging. Central   and distal airway secretions and resultant mosaic attenuation of the   aerated lung parenchyma, likely from air trapping.    ABDOMEN/PELVIS:  -Colon is overall nondistended, limiting evaluation. Scattered mild   apparent colonic wall thickening is suggested. Correlate for any signs   and symptoms of persistent colitis.  -Right anterior kidney faint geographic region of low attenuation on   image 138 series 3 is similar to 4/2/2024 CT, of unclear significance.   Correlate for any signs and symptoms of renal infection.  -Gastrostomy tube localized to the stomach. Mild inflammation/thickening   along gastrostomy tube tract is similar to prior imaging. Correlate   clinically.  -Sacrum decubitus ulcer redemonstrated, with increasedskin thickening in   the interim. Correlate clinically.    < end of copied text >      Zion Newman MD; Division of Infectious Disease; Pager: [phone]; nights and weekends: [phone]

## 2024-04-19 NOTE — PROGRESS NOTE ADULT - PROBLEM SELECTOR PLAN 2
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  F/u ABG appropriate.  - PS weaning as tolerated.   - Chest PT, Duonebs q 6 hrs

## 2024-04-19 NOTE — HOME OXYGEN EVALUATION NOTE - NSHOMEOXYEVAL_PHYATTNON-COVID_GEN_ALL_CORE
All acute illnesses and/or exacerbations have been treated and resolved; patient is in a chronic stable state. Alternative treatment methods have been tried and failed. 29.3

## 2024-04-19 NOTE — PROGRESS NOTE ADULT - ASSESSMENT
73yo woman  h/o T2DM (4/4/24: A1C = 6.2%), HTN, HLD, anemia, hypothyroidism, RA, fibromyalgia, remote cerebral aneurysm repair, acute hypercapnic respiratory failure with tracheostomy, PEG tube (initially placed 2022), and bedbound, recurrent C diff presented for PEG tube dislodgment. Admitted 4/2/24  weight = 54.5 kg    Recurrent C diff - first episode Aug 2023 treated with tapering PO vanco, recurrent episode 1/31/24 treated with PO vanco and then fidaxomicin completed in Feb - most recently tested positive 3/20/24 seen by Dr. Newman 3/29 outpatient, started on fidaxomicin that day - tube feeds concurrently held, unclear if improving afterwards per family  Chronic sacral decubitus wound  3/20 Klebisella bacteruria R only to amp and sputum few Pseudomonas R to FQs w/o polys on gram stian - treatment of urine was deferred to avoid exacerbating C diff    Tmax 100, no leukocytosis  Concern for cellulitis around PEG tube site - area with very minimal erythema, remarkable receded from skin norm placed on admission  UA 11 WBC  CT a/p: no infectious focus or colitis  MRSA PCR+    4/3 IR - PEG exchanged bedside to 20 Fr Kangaroo EnFit  - Bedside XR demonstrates appropriately positioned G-tube with contrast filling into the stomach and bowel.    4/2 Blood Cultures x 2 NGTD;  Positive MRSA/MSSA PCR  4/3 Urine cultures NEG  4/4 fever  4/4 Wound care assessment appreciated:   " area of persistent nonblanchable dark discoloration that is inconsistent with a patient's surrounding skin tone as well as a white juan j film noted over B/L buttocks/sacral skin, area measures approximately 10cm x 10cm x 0cm- presentation is consistent with a deep tissue injury in evolution with incontinence and fungal involvement present on admission. Within the apex of the gluteal cleft/base of sacrum there is full thickness skin loss with red, beefy tissue noted, area measures approximately 3cm x 1cm x 0.3cm- presentation is consistent with a deep tissue injury in evolution with incontinence involvement present on admission."  4/7 fever; Meropenem resumed  Positive Blood Culture x1 Serratia marcesens    4/16 OR: Gastrojejunostomy, percutaneous, endoscopic, Endoscopic assisted closure of PEG site. Endoscopic assisted percutaneous gastrojejunostomy tube placement.   4/19 K= 4.4    Antibiotics  Meropenem 4/2 -->4/3; 4/7 --> 4/11  Cefepime 4/11 --> 4/14  IV Vanco 4/2  Fdaxomicin 4/3--> 4/8  PO Vanco 4/8 -->     Suggest:  maintain fluid and electrolytes with increased diarrhea - KCl repletion   Continue po Vanco  Extended po Vancomycin taper  Bezlotoxumab (Zinplava) at end of Vanco course as out pt    Vanco dosing schedule  Vancomycin 125 mg po 4 times daily 4/8 --> 4/18  Vancomycin 125 mg po 2 times daily 4/19 --> 4/25  Vancomycin 125 mg po once daily 4/26 -->5/2  Vancomycin 125 mg po once every other day 5/3 --> 5/9  Vancomycin 125 mg po once every third day 5/10 --> 5/23/24    Given multiple recurrences in context of advanced age, debility and multiple co-morbidities, administration of Bezlotoxumab (Zinplava) 500 mg over 1 hours is indicated to reduce future recurrences  Ref: Norm Wheeler M.D., Rojas Contreras M.D., cSott Griffith, Ph.D., Shiva Abraham M.D., Gabriel Falcon D.O., Pedro Weiss M.D., Talib Hampton M.D., Sena Hughes M.D., Raimundo Marte M.D., Melissa Soria M.D., Piyush Lamas M.D., Eliceo Romero M.D., Neyda Xie M.D., Jude Peters M.D., Neelima Vela B.S.M.T., Hannah Cardenas, Ph.D., Ana Quiroz, B.S., Obie Chirinos, M.S., Khushbu Flores M.D., Jean Carlos Ayala M.D., and Kristin Lopez, Ph.D., for the MODIFY I and MODIFY II Investigators*    Bezlotoxumab is suggested in IDSA guidelines -  Ref: Clinical Practice Guidelines for the Management of Clostridioides difficile Infection in Adults: 2021 Update by SHEA/SALVADOR  Published VERNA, 6/14/2021; Clinical Infectious Diseases, [id number], https://doi.org/10.1093/verna/kojr930    discussed with daughter -she is concerned about rate of feeds, amount of diarrhea, and leakage from previous PEG site and has asked that rate be increased gradually. I respectfully discussed my opinion that nutrition if over riding priority - even if there is more diarrhea and leakage....  Zinplava to be administered at home at end of vanco taper    Please call ID if needed over weekend

## 2024-04-19 NOTE — PROGRESS NOTE ADULT - SUBJECTIVE AND OBJECTIVE BOX
[facility]-- WOUND TEAM -- FOLLOW UP NOTE  --------------------------------------------------------------------------------    24 hour events/subjective:    Daughter and Spouse at bedside   Last temp 36.8 C  Tolerating po w/o n/v  Chart reviewed  Hx of 75 yo F PMH T2DM on insulin, HTN, HLD, hypothyroidism, RA on Prednisone, Fibromyalgia, cerebral aneurysm s/p repair, acute hypercapnic respiratory failure s/p trach (vent dependent) and PEG (10/22), bedbound presented from home with complaints of possible G tube dislodgement and redness around the site.  Site is red with small amount of pus at insertion site.  Tender to palpation, warm to touch. CT Abdomen/Pelvis done showing dislodgement of G tube with balloon in the anterior abdominal wall with air filled track to stomach.  GI contacted, but unable to replace at bedside given placement of balloon. Surgery consulted--recommend gastrograffin study to eval placement of PEG and possible IR replacement in AM. Given Vancomycin 1 gm IV x 1 due to concern for possible cellulitis.  Of note, patient hemodynamically stable, afebrile, without leukocytosis on presentation.   dc planning ongoing        Diet:  Diet, NPO with Tube Feed:   Tube Feeding Modality: Gastrostomy  [facility] Peptide 1.5 ([id number])  Total Volume for 24 Hours (mL): 990  Continuous  Starting Tube Feed Rate mL per Hour: 30  Increase Tube Feed Rate by (mL): 10     Every 3 hours  Until Goal Tube Feed Rate (mL per Hour): 55  Tube Feed Duration (in Hours): 18  Tube Feed Start Time: 06:00  Tube Feed Stop Time: 00:00  Free Water Flush  Pump   Rate (mL per Hour): 20   Frequency: Every 2 Hours  Alfred(7 Gm Arginine/7 Gm Glut/1.2 Gm HMB     Qty per Day:  2 (04-18-24 @ 13:01)      ROS:  pt unable to participate    ALLERGIES & MEDICATIONS  --------------------------------------------------------------------------------  Allergies    pineapple (Unknown)  Tagamet (Unknown)  heparin (Unknown)  walnut (Unknown)  metronidazole (Rash)  penicillin (Unknown)  Lyrica (Unknown)  meropenem (Rash)  Pecan, Filbert, Hazelnut (Unknown)    Intolerances          STANDING INPATIENT MEDICATIONS    albuterol/ipratropium for Nebulization 3 milliLiter(s) Nebulizer every 6 hours  amLODIPine   Tablet 10 milliGRAM(s) Oral <User Schedule>  artificial tears (preservative free) Ophthalmic Solution 1 Drop(s) Both EYES four times a day  ascorbic acid 500 milliGRAM(s) Oral daily  Biotene Dry Mouth Oral Rinse 5 milliLiter(s) Swish and Spit every 6 hours  chlorhexidine 0.12% Liquid 15 milliLiter(s) Oral Mucosa every 12 hours  chlorhexidine 4% Liquid 1 Application(s) Topical <User Schedule>  cholecalciferol 2000 Unit(s) Oral daily  erythromycin   Ointment 1 Application(s) Both EYES three times a day  escitalopram 10 milliGRAM(s) Oral <User Schedule>  fentaNYL   Patch  25 MICROgram(s)/Hr 1 Patch Transdermal every 72 hours  gabapentin Solution 100 milliGRAM(s) Oral <User Schedule>  insulin glargine Injectable (LANTUS) 5 Unit(s) SubCutaneous <User Schedule>  insulin lispro (ADMELOG) corrective regimen sliding scale   SubCutaneous every 6 hours  levothyroxine Injectable 87.5 MICROGram(s) IV Push <User Schedule>  lidocaine   4% Patch 1 Patch Transdermal at bedtime  lidocaine   4% Patch 1 Patch Transdermal at bedtime  melatonin Liquid 3 milliGRAM(s) Oral at bedtime  multivitamin/minerals/iron Oral Solution (CENTRUM) 15 milliLiter(s) Oral daily  pantoprazole  Injectable 40 milliGRAM(s) IV Push daily  prednisoLONE acetate 1% Suspension 1 Drop(s) Both EYES four times a day  predniSONE  Solution 5 milliGRAM(s) Oral <User Schedule>  QUEtiapine 12.5 milliGRAM(s) Oral <User Schedule>  simethicone 80 milliGRAM(s) Chew every 6 hours  surgical lubricant sterile 1 Application(s) Topical every 8 hours  vancomycin    Solution 125 milliGRAM(s) Enteral Tube every 12 hours  zinc sulfate 220 milliGRAM(s) Oral two times a day      PRN INPATIENT MEDICATION  acetaminophen   Oral Liquid .. 650 milliGRAM(s) Oral every 6 hours PRN  calamine/zinc oxide Lotion 1 Application(s) Topical daily PRN  HYDROmorphone  Injectable 0.5 milliGRAM(s) IV Push every 4 hours PRN  HYDROmorphone  Injectable 1 milliGRAM(s) IV Push every 6 hours PRN  ondansetron Injectable 4 milliGRAM(s) IV Push every 8 hours PRN  sodium chloride 0.65% Nasal 1 Spray(s) Both Nostrils every 12 hours PRN        VITALS/PHYSICAL EXAM  --------------------------------------------------------------------------------  T(C): 36.8 (04-19-24 @ 09:00), Max: 37 (04-19-24 @ 06:00)  HR: 91 (04-19-24 @ 14:50) (70 - 95)  BP: 140/67 (04-19-24 @ 09:00) (120/55 - 144/65)  RR: 16 (04-19-24 @ 09:00) (16 - 19)  SpO2: 100% (04-19-24 @ 14:50) (99% - 100%)  Wt(kg): --        04-18-24 @ 07:01  -  04-19-24 @ 07:00  --------------------------------------------------------  IN: 640 mL / OUT: 2650 mL / NET: -2010 mL    Physical Exam:  General: alert  Ophthamology: sclera clear  ENMT: moist mucous membranes, trachea midline, trach  Respiratory: equal chest rise with respirations, vented  Gastrointestinal: soft NT/ND  Neurology: nonverbal, not following commands  Psych: calm, cooperative  Musculoskeletal: no contractures  Vascular: BLE edema equal  Skin:  Sacral/bilateral buttocks with contracted, scarred tissue in and around the gluteal cleft with        surrounding dark maroon/purple discoloration  10.0 cm x  10.0 cm x  0.1cm,        central cleft with linear superficially denuded skin  3.0 cm X  0.2cm x  0.0cm  scant serosanguinous drainage  Left ischium with contracted, scarred tissue very close to the anus with central superficially denuded tissue,            No odor, erythema, increased warmth, tenderness, induration, fluctuance          LABS/ CULTURES/ RADIOLOGY:                     8.9    5.90  >-----------<  124      [04-19-24 @ 06:50]              31.5     142  |  107  |  12  ----------------------------<  136      [04-19-24 @ 06:49]  4.4   |  27  |  <0.30        Ca     9.8     [04-19-24 @ 06:49]      Mg     1.8     [04-19-24 @ 06:49]      Phos  2.5     [04-19-24 @ 06:49]    TPro  6.2  /  Alb  2.6  /  TBili  1.8  /  DBili  x   /  AST  52  /  ALT  35  /  AlkPhos  56  [04-19-24 @ 06:49]    PT/INR: PT 14.2 , INR 1.30       [04-19-24 @ 06:50]  PTT: 32.4       [04-19-24 @ 06:50]          CAPILLARY BLOOD GLUCOSE      POCT Blood Glucose.: 169 mg/dL (19 Apr 2024 11:52)  POCT Blood Glucose.: 127 mg/dL (19 Apr 2024 06:03)  POCT Blood Glucose.: 197 mg/dL (19 Apr 2024 00:33)  POCT Blood Glucose.: 244 mg/dL (18 Apr 2024 17:29)        Triglycerides, Serum: 295 mg/dL (04-04-24 @ 07:09)            Culture - Blood (collected 04-15-24 @ 09:05)  Source: .Blood Blood-Peripheral  Preliminary Report (04-18-24 @ 17:08):    No growth at 72 Hours    Culture - Blood (collected 04-15-24 @ 09:00)  Source: .Blood Blood-Peripheral  Preliminary Report (04-18-24 @ 17:08):    No growth at 72 Hours          A1C with Estimated Average Glucose Result: 6.2 % (04-04-24 @ 07:40)  A1C with Estimated Average Glucose Result: 7.5 % (10-28-23 @ 07:18)      >>> <<<

## 2024-04-19 NOTE — PROGRESS NOTE ADULT - ASSESSMENT
#Gastrostomy malfunction/buried bumper  s/p PEG-J placement 4/16  daily wound care   feeds thru j-tube   vent g-tube as needed   ongoing surgery care appreciated     #Recurrent C.diff   per ID recs  would not keep rectal tube in too long given risk for causing rectal ulcer

## 2024-04-19 NOTE — PROGRESS NOTE ADULT - SUBJECTIVE AND OBJECTIVE BOX
INTERVAL HPI/OVERNIGHT EVENTS:    events noted   tolerating feeds    MEDICATIONS  (STANDING):  albuterol/ipratropium for Nebulization 3 milliLiter(s) Nebulizer every 6 hours  amLODIPine   Tablet 10 milliGRAM(s) Oral <User Schedule>  artificial tears (preservative free) Ophthalmic Solution 1 Drop(s) Both EYES four times a day  ascorbic acid 500 milliGRAM(s) Oral daily  Biotene Dry Mouth Oral Rinse 5 milliLiter(s) Swish and Spit every 6 hours  chlorhexidine 0.12% Liquid 15 milliLiter(s) Oral Mucosa every 12 hours  chlorhexidine 4% Liquid 1 Application(s) Topical <User Schedule>  cholecalciferol 2000 Unit(s) Oral daily  erythromycin   Ointment 1 Application(s) Both EYES three times a day  escitalopram 10 milliGRAM(s) Oral <User Schedule>  fentaNYL   Patch  25 MICROgram(s)/Hr 1 Patch Transdermal every 72 hours  gabapentin Solution 100 milliGRAM(s) Oral <User Schedule>  insulin glargine Injectable (LANTUS) 5 Unit(s) SubCutaneous <User Schedule>  insulin lispro (ADMELOG) corrective regimen sliding scale   SubCutaneous every 6 hours  levothyroxine Injectable 87.5 MICROGram(s) IV Push <User Schedule>  lidocaine   4% Patch 1 Patch Transdermal at bedtime  lidocaine   4% Patch 1 Patch Transdermal at bedtime  melatonin Liquid 3 milliGRAM(s) Oral at bedtime  multivitamin/minerals/iron Oral Solution (CENTRUM) 15 milliLiter(s) Oral daily  pantoprazole  Injectable 40 milliGRAM(s) IV Push daily  phytonadione   Solution 10 milliGRAM(s) Oral daily  predniSONE  Solution 5 milliGRAM(s) Oral <User Schedule>  simethicone 80 milliGRAM(s) Chew every 6 hours  surgical lubricant sterile 1 Application(s) Topical every 8 hours  vancomycin    Solution 125 milliGRAM(s) Enteral Tube every 12 hours  zinc sulfate 220 milliGRAM(s) Oral two times a day    MEDICATIONS  (PRN):  acetaminophen   Oral Liquid .. 650 milliGRAM(s) Oral every 6 hours PRN Mild Pain (1 - 3)  calamine/zinc oxide Lotion 1 Application(s) Topical daily PRN Rash and/or Itching  HYDROmorphone  Injectable 1 milliGRAM(s) IV Push every 6 hours PRN Severe Pain (7 - 10)  HYDROmorphone  Injectable 0.5 milliGRAM(s) IV Push every 4 hours PRN Moderate Pain (4 - 6)  ondansetron Injectable 4 milliGRAM(s) IV Push every 8 hours PRN Nausea and/or Vomiting  sodium chloride 0.65% Nasal 1 Spray(s) Both Nostrils every 12 hours PRN Congestion      Allergies    pineapple (Unknown)  Tagamet (Unknown)  heparin (Unknown)  walnut (Unknown)  metronidazole (Rash)  penicillin (Unknown)  Lyrica (Unknown)  meropenem (Rash)  Pecan, Filbert, Hazelnut (Unknown)    Intolerances        Vital Signs Last 24 Hrs  T(C): 36.8 (19 Apr 2024 09:00), Max: 37 (19 Apr 2024 06:00)  T(F): 98.3 (19 Apr 2024 09:00), Max: 98.6 (19 Apr 2024 06:00)  HR: 87 (19 Apr 2024 11:17) (70 - 95)  BP: 140/67 (19 Apr 2024 09:00) (120/55 - 144/65)  BP(mean): --  RR: 16 (19 Apr 2024 09:00) (16 - 19)  SpO2: 99% (19 Apr 2024 11:17) (98% - 100%)    Parameters below as of 19 Apr 2024 11:17  Patient On (Oxygen Delivery Method): ventilator        PHYSICAL EXAM:    Constitutional: NAD  HEENT: EOMI, throat clear  Neck: No LAD, supple  Respiratory: CTA and P  Cardiovascular: S1 and S2, RRR, no M  Gastrointestinal: BS+, soft, NT/ND, neg HSM,  Extremities: No peripheral edema, neg clubbing, cyanosis  Vascular: 2+ peripheral pulses  Neurological: A/O   Psychiatric: Normal mood, normal affect  Skin: No rashes      LABS:                        8.9    5.90  )-----------( 124      ( 19 Apr 2024 06:50 )             31.5     04-19    142  |  107  |  12  ----------------------------<  136<H>  4.4   |  27  |  <0.30<L>    Ca    9.8      19 Apr 2024 06:49  Phos  2.5     04-19  Mg     1.8     04-19    TPro  6.2  /  Alb  2.6<L>  /  TBili  1.8<H>  /  DBili  x   /  AST  52<H>  /  ALT  35  /  AlkPhos  56  04-19    PT/INR - ( 19 Apr 2024 06:50 )   PT: 14.2 sec;   INR: 1.30 ratio         PTT - ( 19 Apr 2024 06:50 )  PTT:32.4 sec  Urinalysis Basic - ( 19 Apr 2024 06:49 )    Color: x / Appearance: x / SG: x / pH: x  Gluc: 136 mg/dL / Ketone: x  / Bili: x / Urobili: x   Blood: x / Protein: x / Nitrite: x   Leuk Esterase: x / RBC: x / WBC x   Sq Epi: x / Non Sq Epi: x / Bacteria: x        RADIOLOGY & ADDITIONAL TESTS:

## 2024-04-19 NOTE — PROGRESS NOTE ADULT - ASSESSMENT
A/P:Hx of 75 yo F PMH T2DM on insulin, HTN, HLD, hypothyroidism, RA on Prednisone, Fibromyalgia, cerebral aneurysm s/p repair, acute hypercapnic respiratory failure s/p trach (vent dependent) and PEG (10/22), bedbound presented from home with complaints of possible G tube dislodgement and redness around the site.  Site is red with small amount of pus at insertion site.  Tender to palpation, warm to touch. CT Abdomen/Pelvis done showing dislodgement of G tube with balloon in the anterior abdominal wall with air filled track to stomach.  GI contacted, but unable to replace at bedside given placement of balloon. Surgery consulted--recommend gastrograffin study to eval placement of PEG and possible IR replacement in AM. Given Vancomycin 1 gm IV x 1 due to concern for possible cellulitis.  Of note, patient hemodynamically stable, afebrile, without leukocytosis on presentation.     Wound Consult requested to assist w/ management of  Sacral/bilateral Buttocks chronic stage 4 pressure injury present on admission  Left ischium chronic stage 4 pressure injury present on admission  Incontinence Dermatitis  -   Continue turning and positioning w/ offloading assistive devices as per protocol  -   Buttocks/ Sacrum / TRIAD BID /cavilon and prn soiling   -   Continue w/ attends under pads and Pericare as per protocol  -   Continue w/ low air loss pressure redistribution bed surface   Moisturize intact skin w/ SWEEN cream BID  Nutrition Consult for optimization in pt w/ Increased nutritional needs  Pt will need Group 2 mattress on hospital bed and ROHO cushion for wheel chair upon discharge home  Care as per medicine, will follow w/ you  Upon discharge f/u as outpatient at [facility] [address] [facility] [phone]  Daughter and spouse educated through teachback method care of patients wounds and prevention of further injury  Seen &  D/w team & RN  Thank you for this consult  Gabby MCINTYRE-BC, [facility]   [phone]  Nights/ Weekends/ Holidays please call:  General Surgery Consult pager [phone]) for emergencies  Wound PT for multilayer leg wrapping or VAC issues (x [phone])

## 2024-04-19 NOTE — PROGRESS NOTE ADULT - SUBJECTIVE AND OBJECTIVE BOX
SURGERY PROGRESS NOTE    Interval events  - No acute events.  - Vital signs stable, afebrile      OBJECTIVE:   Vital Signs Last 24 Hrs  T(C): 36.8 (2024 09:00), Max: 37 (2024 06:00)  T(F): 98.3 (2024 09:00), Max: 98.6 (2024 06:00)  HR: 87 (2024 11:17) (70 - 95)  BP: 140/67 (2024 09:00) (120/55 - 144/65)  BP(mean): --  RR: 16 (2024 09:00) (16 - 19)  SpO2: 99% (2024 11:17) (99% - 100%)    Parameters below as of 2024 11:17  Patient On (Oxygen Delivery Method): ventilator            I&O's Summary    2024 07:01  -  2024 07:00  --------------------------------------------------------  IN: 640 mL / OUT: 2650 mL / NET: - mL      I&O's Detail    2024 07:01  -  2024 07:00  --------------------------------------------------------  IN:    Enteral Tube Flush: 90 mL    Miscellaneous Tube Feedin mL  Total IN: 640 mL    OUT:    Incontinent per Collection Bag (mL): 1500 mL    Rectal Tube (mL): 1150 mL  Total OUT: 2650 mL    Total NET: - mL          MEDICATIONS  (STANDING):  albuterol/ipratropium for Nebulization 3 milliLiter(s) Nebulizer every 6 hours  amLODIPine   Tablet 10 milliGRAM(s) Oral <User Schedule>  artificial tears (preservative free) Ophthalmic Solution 1 Drop(s) Both EYES four times a day  ascorbic acid 500 milliGRAM(s) Oral daily  Biotene Dry Mouth Oral Rinse 5 milliLiter(s) Swish and Spit every 6 hours  chlorhexidine 0.12% Liquid 15 milliLiter(s) Oral Mucosa every 12 hours  chlorhexidine 4% Liquid 1 Application(s) Topical <User Schedule>  cholecalciferol 2000 Unit(s) Oral daily  erythromycin   Ointment 1 Application(s) Both EYES three times a day  escitalopram 10 milliGRAM(s) Oral <User Schedule>  fentaNYL   Patch  25 MICROgram(s)/Hr 1 Patch Transdermal every 72 hours  gabapentin Solution 100 milliGRAM(s) Oral <User Schedule>  insulin glargine Injectable (LANTUS) 5 Unit(s) SubCutaneous <User Schedule>  insulin lispro (ADMELOG) corrective regimen sliding scale   SubCutaneous every 6 hours  levothyroxine Injectable 87.5 MICROGram(s) IV Push <User Schedule>  lidocaine   4% Patch 1 Patch Transdermal at bedtime  lidocaine   4% Patch 1 Patch Transdermal at bedtime  melatonin Liquid 3 milliGRAM(s) Oral at bedtime  multivitamin/minerals/iron Oral Solution (CENTRUM) 15 milliLiter(s) Oral daily  pantoprazole  Injectable 40 milliGRAM(s) IV Push daily  predniSONE  Solution 5 milliGRAM(s) Oral <User Schedule>  QUEtiapine 12.5 milliGRAM(s) Oral <User Schedule>  simethicone 80 milliGRAM(s) Chew every 6 hours  surgical lubricant sterile 1 Application(s) Topical every 8 hours  vancomycin    Solution 125 milliGRAM(s) Enteral Tube every 12 hours  zinc sulfate 220 milliGRAM(s) Oral two times a day    MEDICATIONS  (PRN):  acetaminophen   Oral Liquid .. 650 milliGRAM(s) Oral every 6 hours PRN Mild Pain (1 - 3)  calamine/zinc oxide Lotion 1 Application(s) Topical daily PRN Rash and/or Itching  HYDROmorphone  Injectable 0.5 milliGRAM(s) IV Push every 4 hours PRN Moderate Pain (4 - 6)  HYDROmorphone  Injectable 1 milliGRAM(s) IV Push every 6 hours PRN Severe Pain (7 - 10)  ondansetron Injectable 4 milliGRAM(s) IV Push every 8 hours PRN Nausea and/or Vomiting  sodium chloride 0.65% Nasal 1 Spray(s) Both Nostrils every 12 hours PRN Congestion    PHYSICAL EXAM  General: No acute distress, resting comfortably in bed.  Abdomen: soft, nondistended, mild TTP by new GJ tube, no erythema, 5cm at the bumper, dressing clean/dry; prior PEG tube site with thick mucous like drainage.       LABS:                        8.9    5.90  )-----------( 124      ( 2024 06:50 )             31.5         142  |  107  |  12  ----------------------------<  136<H>  4.4   |  27  |  <0.30<L>    Ca    9.8      2024 06:49  Phos  2.5       Mg     1.8         TPro  6.2  /  Alb  2.6<L>  /  TBili  1.8<H>  /  DBili  x   /  AST  52<H>  /  ALT  35  /  AlkPhos  56  -    PT/INR - ( 2024 06:50 )   PT: 14.2 sec;   INR: 1.30 ratio         PTT - ( 2024 06:50 )  PTT:32.4 sec  Urinalysis Basic - ( 2024 06:49 )    Color: x / Appearance: x / SG: x / pH: x  Gluc: 136 mg/dL / Ketone: x  / Bili: x / Urobili: x   Blood: x / Protein: x / Nitrite: x   Leuk Esterase: x / RBC: x / WBC x   Sq Epi: x / Non Sq Epi: x / Bacteria: x        RADIOLOGY & ADDITIONAL STUDIES:

## 2024-04-20 LAB
CULTURE RESULTS: SIGNIFICANT CHANGE UP
CULTURE RESULTS: SIGNIFICANT CHANGE UP
GLUCOSE BLDC GLUCOMTR-MCNC: 188 MG/DL — HIGH (ref 70–99)
GLUCOSE BLDC GLUCOMTR-MCNC: 243 MG/DL — HIGH (ref 70–99)
GLUCOSE BLDC GLUCOMTR-MCNC: 364 MG/DL — HIGH (ref 70–99)
SPECIMEN SOURCE: SIGNIFICANT CHANGE UP
SPECIMEN SOURCE: SIGNIFICANT CHANGE UP

## 2024-04-20 RX ORDER — DICLOFENAC SODIUM 30 MG/G
4 GEL TOPICAL
Refills: 0 | Status: DISCONTINUED | OUTPATIENT
Start: 2024-04-20 | End: 2024-05-01

## 2024-04-20 RX ORDER — OXYCODONE HYDROCHLORIDE 5 MG/1
10 TABLET ORAL EVERY 6 HOURS
Refills: 0 | Status: DISCONTINUED | OUTPATIENT
Start: 2024-04-20 | End: 2024-04-20

## 2024-04-20 RX ORDER — INSULIN LISPRO 100/ML
10 VIAL (ML) SUBCUTANEOUS ONCE
Refills: 0 | Status: COMPLETED | OUTPATIENT
Start: 2024-04-20 | End: 2024-04-20

## 2024-04-20 RX ORDER — OXYCODONE HYDROCHLORIDE 5 MG/1
5 TABLET ORAL EVERY 4 HOURS
Refills: 0 | Status: DISCONTINUED | OUTPATIENT
Start: 2024-04-20 | End: 2024-04-27

## 2024-04-20 RX ORDER — INSULIN LISPRO 100/ML
VIAL (ML) SUBCUTANEOUS EVERY 6 HOURS
Refills: 0 | Status: DISCONTINUED | OUTPATIENT
Start: 2024-04-20 | End: 2024-04-22

## 2024-04-20 RX ORDER — INSULIN GLARGINE 100 [IU]/ML
12 INJECTION, SOLUTION SUBCUTANEOUS
Refills: 0 | Status: DISCONTINUED | OUTPATIENT
Start: 2024-04-20 | End: 2024-05-01

## 2024-04-20 RX ADMIN — Medication 10 UNIT(S): at 18:35

## 2024-04-20 RX ADMIN — ZINC SULFATE TAB 220 MG (50 MG ZINC EQUIVALENT) 220 MILLIGRAM(S): 220 (50 ZN) TAB at 06:49

## 2024-04-20 RX ADMIN — SIMETHICONE 80 MILLIGRAM(S): 80 TABLET, CHEWABLE ORAL at 06:48

## 2024-04-20 RX ADMIN — Medication 1 DROP(S): at 06:49

## 2024-04-20 RX ADMIN — Medication 5 MILLILITER(S): at 12:45

## 2024-04-20 RX ADMIN — ESCITALOPRAM OXALATE 10 MILLIGRAM(S): 10 TABLET, FILM COATED ORAL at 18:28

## 2024-04-20 RX ADMIN — Medication 1 DROP(S): at 06:48

## 2024-04-20 RX ADMIN — Medication 1 DROP(S): at 00:17

## 2024-04-20 RX ADMIN — LIDOCAINE 1 PATCH: 4 CREAM TOPICAL at 08:41

## 2024-04-20 RX ADMIN — Medication 5 MILLIGRAM(S): at 10:38

## 2024-04-20 RX ADMIN — Medication 3 MILLILITER(S): at 05:14

## 2024-04-20 RX ADMIN — Medication 1 DROP(S): at 12:49

## 2024-04-20 RX ADMIN — QUETIAPINE FUMARATE 12.5 MILLIGRAM(S): 200 TABLET, FILM COATED ORAL at 12:44

## 2024-04-20 RX ADMIN — LIDOCAINE 1 PATCH: 4 CREAM TOPICAL at 12:31

## 2024-04-20 RX ADMIN — LIDOCAINE 1 PATCH: 4 CREAM TOPICAL at 22:09

## 2024-04-20 RX ADMIN — AMLODIPINE BESYLATE 10 MILLIGRAM(S): 2.5 TABLET ORAL at 09:23

## 2024-04-20 RX ADMIN — Medication 500 MILLIGRAM(S): at 12:44

## 2024-04-20 RX ADMIN — Medication 1 DROP(S): at 12:45

## 2024-04-20 RX ADMIN — PANTOPRAZOLE SODIUM 40 MILLIGRAM(S): 20 TABLET, DELAYED RELEASE ORAL at 12:44

## 2024-04-20 RX ADMIN — CHLORHEXIDINE GLUCONATE 1 APPLICATION(S): 213 SOLUTION TOPICAL at 06:50

## 2024-04-20 RX ADMIN — INSULIN GLARGINE 12 UNIT(S): 100 INJECTION, SOLUTION SUBCUTANEOUS at 18:35

## 2024-04-20 RX ADMIN — Medication 125 MILLIGRAM(S): at 18:07

## 2024-04-20 RX ADMIN — Medication 3 MILLILITER(S): at 17:15

## 2024-04-20 RX ADMIN — Medication 2: at 12:43

## 2024-04-20 RX ADMIN — GABAPENTIN 100 MILLIGRAM(S): 400 CAPSULE ORAL at 09:23

## 2024-04-20 RX ADMIN — Medication 3 MILLILITER(S): at 23:44

## 2024-04-20 RX ADMIN — Medication 5 MILLILITER(S): at 18:09

## 2024-04-20 RX ADMIN — Medication 3 MILLIGRAM(S): at 22:08

## 2024-04-20 RX ADMIN — CHLORHEXIDINE GLUCONATE 15 MILLILITER(S): 213 SOLUTION TOPICAL at 18:09

## 2024-04-20 RX ADMIN — Medication 2: at 00:15

## 2024-04-20 RX ADMIN — SIMETHICONE 80 MILLIGRAM(S): 80 TABLET, CHEWABLE ORAL at 18:06

## 2024-04-20 RX ADMIN — Medication 2000 UNIT(S): at 12:44

## 2024-04-20 RX ADMIN — SIMETHICONE 80 MILLIGRAM(S): 80 TABLET, CHEWABLE ORAL at 12:44

## 2024-04-20 RX ADMIN — Medication 1 APPLICATION(S): at 22:09

## 2024-04-20 RX ADMIN — Medication 1 APPLICATION(S): at 06:50

## 2024-04-20 RX ADMIN — Medication 125 MILLIGRAM(S): at 06:17

## 2024-04-20 RX ADMIN — Medication 5 MILLILITER(S): at 06:50

## 2024-04-20 RX ADMIN — SIMETHICONE 80 MILLIGRAM(S): 80 TABLET, CHEWABLE ORAL at 00:17

## 2024-04-20 RX ADMIN — Medication 1 APPLICATION(S): at 06:48

## 2024-04-20 RX ADMIN — GABAPENTIN 100 MILLIGRAM(S): 400 CAPSULE ORAL at 22:08

## 2024-04-20 RX ADMIN — Medication 1 APPLICATION(S): at 15:04

## 2024-04-20 RX ADMIN — CHLORHEXIDINE GLUCONATE 15 MILLILITER(S): 213 SOLUTION TOPICAL at 06:50

## 2024-04-20 RX ADMIN — Medication 3 MILLILITER(S): at 11:18

## 2024-04-20 RX ADMIN — Medication 1 DROP(S): at 18:06

## 2024-04-20 RX ADMIN — LIDOCAINE 1 PATCH: 4 CREAM TOPICAL at 22:08

## 2024-04-20 RX ADMIN — Medication 5 MILLILITER(S): at 00:16

## 2024-04-20 RX ADMIN — Medication 1 APPLICATION(S): at 15:05

## 2024-04-20 RX ADMIN — Medication 1: at 06:18

## 2024-04-20 RX ADMIN — ZINC SULFATE TAB 220 MG (50 MG ZINC EQUIVALENT) 220 MILLIGRAM(S): 220 (50 ZN) TAB at 18:06

## 2024-04-20 RX ADMIN — Medication 15 MILLILITER(S): at 12:45

## 2024-04-20 RX ADMIN — Medication 87.5 MICROGRAM(S): at 06:17

## 2024-04-20 RX ADMIN — DICLOFENAC SODIUM 4 GRAM(S): 30 GEL TOPICAL at 18:07

## 2024-04-20 RX ADMIN — Medication 1 APPLICATION(S): at 22:08

## 2024-04-20 RX ADMIN — LIDOCAINE 1 PATCH: 4 CREAM TOPICAL at 08:40

## 2024-04-20 RX ADMIN — Medication 1 DROP(S): at 18:07

## 2024-04-20 NOTE — PROGRESS NOTE ADULT - SUBJECTIVE AND OBJECTIVE BOX
Chief Complaint:  Patient is a 74y old  Female who presents with a chief complaint of Dislodged G Tube (20 Apr 2024 10:17)      Date of service 04-20-24 @ 11:51      Interval Events:   tolerating tube feeds    Hospital Medications:  albuterol/ipratropium for Nebulization 3 milliLiter(s) Nebulizer every 6 hours  amLODIPine   Tablet 10 milliGRAM(s) Oral <User Schedule>  artificial tears (preservative free) Ophthalmic Solution 1 Drop(s) Both EYES four times a day  ascorbic acid 500 milliGRAM(s) Oral daily  Biotene Dry Mouth Oral Rinse 5 milliLiter(s) Swish and Spit every 6 hours  calamine/zinc oxide Lotion 1 Application(s) Topical daily PRN  chlorhexidine 0.12% Liquid 15 milliLiter(s) Oral Mucosa every 12 hours  chlorhexidine 4% Liquid 1 Application(s) Topical <User Schedule>  cholecalciferol 2000 Unit(s) Oral daily  erythromycin   Ointment 1 Application(s) Both EYES three times a day  escitalopram 10 milliGRAM(s) Oral <User Schedule>  fentaNYL   Patch  25 MICROgram(s)/Hr 1 Patch Transdermal every 72 hours  gabapentin Solution 100 milliGRAM(s) Oral <User Schedule>  insulin glargine Injectable (LANTUS) 5 Unit(s) SubCutaneous <User Schedule>  insulin lispro (ADMELOG) corrective regimen sliding scale   SubCutaneous every 6 hours  levothyroxine Injectable 87.5 MICROGram(s) IV Push <User Schedule>  lidocaine   4% Patch 1 Patch Transdermal at bedtime  lidocaine   4% Patch 1 Patch Transdermal at bedtime  melatonin Liquid 3 milliGRAM(s) Oral at bedtime  multivitamin/minerals/iron Oral Solution (CENTRUM) 15 milliLiter(s) Oral daily  ondansetron Injectable 4 milliGRAM(s) IV Push every 8 hours PRN  oxyCODONE    Solution 5 milliGRAM(s) Enteral Tube every 4 hours PRN  oxyCODONE    Solution 10 milliGRAM(s) Enteral Tube every 6 hours PRN  pantoprazole  Injectable 40 milliGRAM(s) IV Push daily  prednisoLONE acetate 1% Suspension 1 Drop(s) Both EYES four times a day  predniSONE  Solution 5 milliGRAM(s) Oral <User Schedule>  QUEtiapine 12.5 milliGRAM(s) Oral <User Schedule>  simethicone 80 milliGRAM(s) Chew every 6 hours  sodium chloride 0.65% Nasal 1 Spray(s) Both Nostrils every 12 hours PRN  surgical lubricant sterile 1 Application(s) Topical every 8 hours  vancomycin    Solution 125 milliGRAM(s) Enteral Tube every 12 hours  zinc sulfate 220 milliGRAM(s) Oral two times a day        Review of Systems:  General:  No wt loss, fevers, chills, night sweats, fatigue,   Eyes:  Good vision, no reported pain  ENT:  No sore throat, pain, runny nose, dysphagia  CV:  No pain, palpitations, hypo/hypertension  Resp:  No dyspnea, cough, tachypnea, wheezing  GI:  See HPI  :  No pain, bleeding, incontinence, nocturia  Muscle:  No pain, weakness  Neuro:  No weakness, tingling, memory problems  Psych:  No fatigue, insomnia, mood problems, depression  Endocrine:  No polyuria, polydipsia, cold/heat intolerance  Heme:  No petechiae, ecchymosis, easy bruisability  Integumentary:  No rash, edema    PHYSICAL EXAM:   Vital Signs:  Vital Signs Last 24 Hrs  T(C): 36.9 (20 Apr 2024 09:36), Max: 36.9 (20 Apr 2024 09:36)  T(F): 98.4 (20 Apr 2024 09:36), Max: 98.4 (20 Apr 2024 09:36)  HR: 79 (20 Apr 2024 11:25) (77 - 97)  BP: 135/63 (20 Apr 2024 09:36) (117/56 - 155/75)  BP(mean): --  RR: 20 (20 Apr 2024 09:36) (16 - 23)  SpO2: 98% (20 Apr 2024 11:25) (98% - 100%)    Parameters below as of 20 Apr 2024 09:36  Patient On (Oxygen Delivery Method): ventilator    O2 Concentration (%): 30  Daily     Daily       PHYSICAL EXAM:     GENERAL:  Appears stated age, well-groomed, well-nourished, no distress  HEENT:  NC/AT,  conjunctivae anicteric, clear and pink,   NECK: supple, trachea midline  CHEST:  Full & symmetric excursion, no increased effort, breath sounds clear  HEART:  Regular rhythm, no JVD  ABDOMEN:  Soft, non-tender, non-distended, normoactive bowel sounds,  no masses , no hepatosplenomegaly  EXTREMITIES:  no cyanosis,clubbing or edema  SKIN:  No rash, erythema, or, ecchymoses, no jaundice  NEURO:  Alert, non-focal, no asterixis  PSYCH: Appropriate affect, oriented to place and time  RECTAL: Deferred      LABS Personally reviewed by me:                        8.9    5.90  )-----------( 124      ( 19 Apr 2024 06:50 )             31.5       04-19    142  |  107  |  12  ----------------------------<  136<H>  4.4   |  27  |  <0.30<L>    Ca    9.8      19 Apr 2024 06:49  Phos  2.5     04-19  Mg     1.8     04-19    TPro  6.2  /  Alb  2.6<L>  /  TBili  1.8<H>  /  DBili  x   /  AST  52<H>  /  ALT  35  /  AlkPhos  56  04-19    LIVER FUNCTIONS - ( 19 Apr 2024 06:49 )  Alb: 2.6 g/dL / Pro: 6.2 g/dL / ALK PHOS: 56 U/L / ALT: 35 U/L / AST: 52 U/L / GGT: x           PT/INR - ( 19 Apr 2024 06:50 )   PT: 14.2 sec;   INR: 1.30 ratio         PTT - ( 19 Apr 2024 06:50 )  PTT:32.4 sec  Urinalysis Basic - ( 19 Apr 2024 06:49 )    Color: x / Appearance: x / SG: x / pH: x  Gluc: 136 mg/dL / Ketone: x  / Bili: x / Urobili: x   Blood: x / Protein: x / Nitrite: x   Leuk Esterase: x / RBC: x / WBC x   Sq Epi: x / Non Sq Epi: x / Bacteria: x                              8.9    5.90  )-----------( 124      ( 19 Apr 2024 06:50 )             31.5                         8.9    5.54  )-----------( 101      ( 18 Apr 2024 07:11 )             29.5       Imaging personally reviewed by me:

## 2024-04-20 NOTE — PROGRESS NOTE ADULT - PROBLEM SELECTOR PLAN 1
- Patient p/w PEG tube dislodgement on admission   - CT A/P 4/2: Dislodged G tube with balloon in the anterior abdominal wall with air filled track to stomach  - S/p bedside exchange by IR on 4/3 ( # 20 Fr Kangaroo EnFit placed )  - 4/4: PEG leaking; IR adjusted PEG at bedside  - 4/5: PEG still leaking, adjusted at bedside by IR Attending.  - 4/8-4/9 IR aborted procedure; GI Dr. Cleaning consulted for new PEG  - 4/9 PEG fell out spontaneously, Mcbride placed into tract   - 4/12: Mcbride replaced with 18Fr PEG at bedside, bumper at 8cm (intentionally loose)  - CT A/P 4/12: G-tube in the stomach/Mild inflammation thickening along tract   - 4/14 18 fr PEG was used, feeds were re-initiated, attempt failed, PEG with copious amounts of leakage  - 4/16 S/p new PEG-J and closure of gastric fistula in endo suite with GI and surgery  - TFs initiated at 20 cc/hr; will advance as tolerated - Patient p/w PEG tube dislodgement on admission   - CT A/P 4/2: Dislodged G tube with balloon in the anterior abdominal wall with air filled track to stomach  - S/p bedside exchange by IR on 4/3 ( # 20 Fr Kangaroo EnFit placed )  - 4/4: PEG leaking; IR adjusted PEG at bedside  - 4/5: PEG still leaking, adjusted at bedside by IR Attending.  - 4/8-4/9 IR aborted procedure; GI Dr. Cleaning consulted for new PEG  - 4/9 PEG fell out spontaneously, Mcbride placed into tract   - 4/12: Mcbride replaced with 18Fr PEG at bedside, bumper at 8cm (intentionally loose)  - CT A/P 4/12: G-tube in the stomach/Mild inflammation thickening along tract   - 4/14 18 fr PEG was used, feeds were re-initiated, attempt failed, PEG with copious amounts of leakage  - 4/16 S/p new PEG-J and closure of gastric fistula in endo suite with GI and surgery  - 4/20: Tube feeds advanced 50ml/hr, goal at 55ml/hr over 18hrs.

## 2024-04-20 NOTE — PROGRESS NOTE ADULT - NS ATTEND AMEND GEN_ALL_CORE FT
73 y/o F w/chronic respiratory failure with hypoxia and hypercapnia s/p trach/PEG (vent dependent), RA on prednisone, DM, cerebral aneurysm s/p repair, and recurrent C. Diff infection admitted for dislodged PEG tube. Now s/p replacement of G-J tube via GI/surgery.     - tolerating TF  - Continue mechanical ventilation  - PO vancomycin per ID for hx of cdiff  - DVT prophylaxis  - DNR/DNI  - Dispo planning, prior discussions with plan for dc home

## 2024-04-20 NOTE — PROGRESS NOTE ADULT - ASSESSMENT
73 yo F PMH T2DM on insulin, HTN, HLD, hypothyroidism, RA on Prednisone, Fibromyalgia, cerebral aneurysm s/p repair, chronic hypercapnic respiratory failure s/p trach (vent dependent) and Chronic PEG 2022, recurrent C. diff infection (follows with Dr. Newman) , bedbound who presented from home with G tube dislodgement and redness around the site. Had CT Abdomen/Pelvis done showing dislodgement of G tube with balloon in the anterior abdominal wall with air filled track to stomach. Admitted to MICU for ventilator management and given vancomycin/meropenem empirically for treatment of abd wall cellulitis. Per family patient has had Known c diff infection for past 2-3 weeks.  and was being Treated with Fidaxomicin.  IR team exchanged PEG on 4/3 however later in the day it remained with leakage.  IR Attending adjusted PEG at bedside on 4/4 and PEG remained with moderate amount of PEG leakage once feeds initiated at reduced rate.  GI team re-consulted as second opinion for PEG management.  On 4/9 PEG was found out of place, a emerson catheter was placed in the stoma to maintain patency.  Patient was planned for PEG revision with both Surgery and GI team involved on 4/11 however patient had fevers up to 102F and procedure was cancelled for infectious work-up.  An 18 Fr PEG tube placed at bedside on 4/12 (original PEG size was 20Fr) as stoma site appears to have closed. Antibiotics were switched from Meropenem to Cefepime, completed 4/14. Patient S/p PEG-J and closure of gastric fistula with GI and surgery in endo suite on 4/16.      4/17: Patient S/p G-J Tube placement and closure of gastric fistula yesterday by GI and Surgery. Patient evaluated at bedside with Surgery  this morning and case d/w  will initiate feeds today @ 20 cc/hr. Heme recommendations appreciated fibrinogen sent (Results pending), B12 and Folate ordered for the morning.   4/18:  Tolerating feeds via PEG-J thus far, slowly advancing to goal rate throughout the day.  Daughter concerned patient may be having rheumatic flare given arthralgias and hand swelling.  As high dose steroids would impair surgical wound healing will defer to one time use of Toradol 15mg for acute flare.   PS weaning as tolerated.  4/19: Patient with increased discharge from original PEG stoma over night.  Site observed this morning, with mild clear viscous discharge, otherwise site clean.  Hand joint pain and swelling appears  improved compared to yesterday. Will give another dose of toradol to control flare. 73 yo F PMH T2DM on insulin, HTN, HLD, hypothyroidism, RA on Prednisone, Fibromyalgia, cerebral aneurysm s/p repair, chronic hypercapnic respiratory failure s/p trach (vent dependent) and Chronic PEG 2022, recurrent C. diff infection (follows with Dr. Newman) , bedbound who presented from home with G tube dislodgement and redness around the site. Had CT Abdomen/Pelvis done showing dislodgement of G tube with balloon in the anterior abdominal wall with air filled track to stomach. Admitted to MICU for ventilator management and given vancomycin/meropenem empirically for treatment of abd wall cellulitis. Per family patient has had Known c diff infection for past 2-3 weeks.  and was being Treated with Fidaxomicin.  IR team exchanged PEG on 4/3 however later in the day it remained with leakage.  IR Attending adjusted PEG at bedside on 4/4 and PEG remained with moderate amount of PEG leakage once feeds initiated at reduced rate.  GI team re-consulted as second opinion for PEG management.  On 4/9 PEG was found out of place, a emerson catheter was placed in the stoma to maintain patency.  Patient was planned for PEG revision with both Surgery and GI team involved on 4/11 however patient had fevers up to 102F and procedure was cancelled for infectious work-up.  An 18 Fr PEG tube placed at bedside on 4/12 (original PEG size was 20Fr) as stoma site appears to have closed. Antibiotics were switched from Meropenem to Cefepime, completed 4/14. Patient S/p PEG-J and closure of gastric fistula with GI and surgery in endo suite on 4/16.      4/17: Patient S/p G-J Tube placement and closure of gastric fistula yesterday by GI and Surgery. Patient evaluated at bedside with Surgery  this morning and case d/w  will initiate feeds today @ 20 cc/hr. Heme recommendations appreciated fibrinogen sent (Results pending), B12 and Folate ordered for the morning.   4/18:  Tolerating feeds via PEG-J thus far, slowly advancing to goal rate throughout the day.  Daughter concerned patient may be having rheumatic flare given arthralgias and hand swelling.  As high dose steroids would impair surgical wound healing will defer to one time use of Toradol 15mg for acute flare.   PS weaning as tolerated.  4/19: Patient with increased discharge from original PEG stoma over night.  Site observed this morning, with mild clear viscous discharge, otherwise site clean.  Hand joint pain and swelling appears  improved compared to yesterday. Will give another dose of toradol to control flare.  4/20: No acute events over night.  Tolerated feeds via PEJ, remains with mild-moderate clear viscous output from old PEG stoma site.  PEG feeds advanced to 50ml/hr, goal 55ml/hr.

## 2024-04-20 NOTE — PROGRESS NOTE ADULT - ASSESSMENT
#Gastrostomy malfunction/buried bumper  s/p PEG-J placement 4/16  daily wound care   feeds/meds thru j-tube   vent g-tube as needed   ongoing surgery care appreciated     #Recurrent C.diff   per ID recs

## 2024-04-20 NOTE — PROGRESS NOTE ADULT - SUBJECTIVE AND OBJECTIVE BOX
Patient is a 74y old  Female who presents with a chief complaint of Dislodged G Tube (19 Apr 2024 15:56)      Interval Events:    REVIEW OF SYSTEMS:  [ ] Positive  [ ] All other systems negative  [ ] Unable to assess ROS because ________    Vital Signs Last 24 Hrs  T(C): 36.9 (04-20-24 @ 09:36), Max: 36.9 (04-20-24 @ 09:36)  T(F): 98.4 (04-20-24 @ 09:36), Max: 98.4 (04-20-24 @ 09:36)  HR: 80 (04-20-24 @ 09:36) (77 - 97)  BP: 135/63 (04-20-24 @ 09:36) (117/56 - 155/75)  RR: 20 (04-20-24 @ 09:36) (16 - 23)  SpO2: 100% (04-20-24 @ 09:36) (98% - 100%)    PHYSICAL EXAM:  HEENT:   [ ]Tracheostomy:  [ ]Pupils equal  [ ]No oral lesions  [ ]Abnormal    SKIN  [ ]No Rash  [ ] Abnormal  [ ] pressure    CARDIAC  [ ]Regular  [ ]Abnormal    PULMONARY  [ ]Bilateral Clear Breath Sounds  [ ]Normal Excursion  [ ]Abnormal    GI  [ ]PEG      [ ] +BS		              [ ]Soft, nondistended, nontender	  [ ]Abnormal    MUSCULOSKELETAL                                   [ ]Bedbound                 [ ]Abnormal    [ ]Ambulatory/OOB to chair                           EXTREMITIES                                         [ ]Normal  [ ]Edema                           NEUROLOGIC  [ ] Normal, non focal  [ ] Focal findings:    PSYCHIATRIC  [ ]Alert and appropriate  [ ] Sedated	 [ ]Agitated    :  Mcbride: [ ] Yes, if yes: Date of Placement:                   [  ] No    LINES: Central Lines [ ] Yes, if yes: Date of Placement                                     [  ] No    HOSPITAL MEDICATIONS:  MEDICATIONS  (STANDING):  albuterol/ipratropium for Nebulization 3 milliLiter(s) Nebulizer every 6 hours  amLODIPine   Tablet 10 milliGRAM(s) Oral <User Schedule>  artificial tears (preservative free) Ophthalmic Solution 1 Drop(s) Both EYES four times a day  ascorbic acid 500 milliGRAM(s) Oral daily  Biotene Dry Mouth Oral Rinse 5 milliLiter(s) Swish and Spit every 6 hours  chlorhexidine 0.12% Liquid 15 milliLiter(s) Oral Mucosa every 12 hours  chlorhexidine 4% Liquid 1 Application(s) Topical <User Schedule>  cholecalciferol 2000 Unit(s) Oral daily  erythromycin   Ointment 1 Application(s) Both EYES three times a day  escitalopram 10 milliGRAM(s) Oral <User Schedule>  fentaNYL   Patch  25 MICROgram(s)/Hr 1 Patch Transdermal every 72 hours  gabapentin Solution 100 milliGRAM(s) Oral <User Schedule>  insulin glargine Injectable (LANTUS) 5 Unit(s) SubCutaneous <User Schedule>  insulin lispro (ADMELOG) corrective regimen sliding scale   SubCutaneous every 6 hours  levothyroxine Injectable 87.5 MICROGram(s) IV Push <User Schedule>  lidocaine   4% Patch 1 Patch Transdermal at bedtime  lidocaine   4% Patch 1 Patch Transdermal at bedtime  melatonin Liquid 3 milliGRAM(s) Oral at bedtime  multivitamin/minerals/iron Oral Solution (CENTRUM) 15 milliLiter(s) Oral daily  pantoprazole  Injectable 40 milliGRAM(s) IV Push daily  prednisoLONE acetate 1% Suspension 1 Drop(s) Both EYES four times a day  predniSONE  Solution 5 milliGRAM(s) Oral <User Schedule>  QUEtiapine 12.5 milliGRAM(s) Oral <User Schedule>  simethicone 80 milliGRAM(s) Chew every 6 hours  surgical lubricant sterile 1 Application(s) Topical every 8 hours  vancomycin    Solution 125 milliGRAM(s) Enteral Tube every 12 hours  zinc sulfate 220 milliGRAM(s) Oral two times a day    MEDICATIONS  (PRN):  calamine/zinc oxide Lotion 1 Application(s) Topical daily PRN Rash and/or Itching  HYDROmorphone  Injectable 1 milliGRAM(s) IV Push every 6 hours PRN Severe Pain (7 - 10)  HYDROmorphone  Injectable 0.5 milliGRAM(s) IV Push every 4 hours PRN Moderate Pain (4 - 6)  ondansetron Injectable 4 milliGRAM(s) IV Push every 8 hours PRN Nausea and/or Vomiting  sodium chloride 0.65% Nasal 1 Spray(s) Both Nostrils every 12 hours PRN Congestion      LABS:                        8.9    5.90  )-----------( 124      ( 19 Apr 2024 06:50 )             31.5     04-19    142  |  107  |  12  ----------------------------<  136<H>  4.4   |  27  |  <0.30<L>    Ca    9.8      19 Apr 2024 06:49  Phos  2.5     04-19  Mg     1.8     04-19    TPro  6.2  /  Alb  2.6<L>  /  TBili  1.8<H>  /  DBili  x   /  AST  52<H>  /  ALT  35  /  AlkPhos  56  04-19    PT/INR - ( 19 Apr 2024 06:50 )   PT: 14.2 sec;   INR: 1.30 ratio         PTT - ( 19 Apr 2024 06:50 )  PTT:32.4 sec  Urinalysis Basic - ( 19 Apr 2024 06:49 )    Color: x / Appearance: x / SG: x / pH: x  Gluc: 136 mg/dL / Ketone: x  / Bili: x / Urobili: x   Blood: x / Protein: x / Nitrite: x   Leuk Esterase: x / RBC: x / WBC x   Sq Epi: x / Non Sq Epi: x / Bacteria: x          CAPILLARY BLOOD GLUCOSE    MICROBIOLOGY:     RADIOLOGY:  [ ] Reviewed and interpreted by me    Mode: AC/ CMV (Assist Control/ Continuous Mandatory Ventilation)  RR (machine): 12  TV (machine): 350  FiO2: 30  PEEP: 5  ITime: 2  MAP: 13  PIP: 32   Patient is a 74y old  Female who presents with a chief complaint of Dislodged G Tube (19 Apr 2024 15:56)      Interval Events: no events reported over night.    REVIEW OF SYSTEMS:  [X] Positive:  Stomach pain "burning"; dry mouth, feels hot (temp 98.6F, taken during exam)  [ ] All other systems negative  [ ] Unable to assess ROS because ________    Vital Signs Last 24 Hrs  T(C): 36.9 (04-20-24 @ 09:36), Max: 36.9 (04-20-24 @ 09:36)  T(F): 98.4 (04-20-24 @ 09:36), Max: 98.4 (04-20-24 @ 09:36)  HR: 80 (04-20-24 @ 09:36) (77 - 97)  BP: 135/63 (04-20-24 @ 09:36) (117/56 - 155/75)  RR: 20 (04-20-24 @ 09:36) (16 - 23)  SpO2: 100% (04-20-24 @ 09:36) (98% - 100%)      PHYSICAL EXAM:  HEENT:   [X] Tracheostomy:  #8 distal XLT Cuffed Shiley  [X] PERRL B/L; EOMI; eyes red L>R  [X] No oral lesions  [ ] Abnormal    SKIN  [ ] No Rash  [ ] Abnormal  [X] pressure: B/L buttocks/sacral deep tissue injury     CARDIAC  [X] Regular  [ ]Abnormal    PULMONARY  [X] Bilateral Clear Breath Sounds  [ ] Normal Excursion  [ ] Abnormal    GI  [X] PEG-J      [X] +BS		              [X] Soft, nondistended, nontender	  [X] Abnormal:  Original PEG stoma with mild amount of clear viscous discharge, site otherwise clean without erythema or purulence    MUSCULOSKELETAL                                   [X] Bedbound                 [ ] Abnormal    [ ] Ambulatory/OOB to chair                           EXTREMITIES                                         [X] Normal  [ ] Edema                       NEUROLOGIC  [ ] Normal, non focal  [X] Focal findings:  Alert and Oriented x3; responds to questions by mouthing words; +gag reflex/cough; no facial asymmetry observed; moves all distal limbs upon request; B/L plantar flexion    PSYCHIATRIC  [X] Lethargic but arousable, affect appropriate  [ ] Sedated	 [ ]Agitated    :  Mcbride: [ ] Yes, if yes: Date of Placement:                   [X ] No    LINES: Central Lines [ ] Yes, if yes: Date of Placement                                     [X] No      HOSPITAL MEDICATIONS:  MEDICATIONS  (STANDING):  albuterol/ipratropium for Nebulization 3 milliLiter(s) Nebulizer every 6 hours  amLODIPine   Tablet 10 milliGRAM(s) Oral <User Schedule>  artificial tears (preservative free) Ophthalmic Solution 1 Drop(s) Both EYES four times a day  ascorbic acid 500 milliGRAM(s) Oral daily  Biotene Dry Mouth Oral Rinse 5 milliLiter(s) Swish and Spit every 6 hours  chlorhexidine 0.12% Liquid 15 milliLiter(s) Oral Mucosa every 12 hours  chlorhexidine 4% Liquid 1 Application(s) Topical <User Schedule>  cholecalciferol 2000 Unit(s) Oral daily  erythromycin   Ointment 1 Application(s) Both EYES three times a day  escitalopram 10 milliGRAM(s) Oral <User Schedule>  fentaNYL   Patch  25 MICROgram(s)/Hr 1 Patch Transdermal every 72 hours  gabapentin Solution 100 milliGRAM(s) Oral <User Schedule>  insulin glargine Injectable (LANTUS) 5 Unit(s) SubCutaneous <User Schedule>  insulin lispro (ADMELOG) corrective regimen sliding scale   SubCutaneous every 6 hours  levothyroxine Injectable 87.5 MICROGram(s) IV Push <User Schedule>  lidocaine   4% Patch 1 Patch Transdermal at bedtime  lidocaine   4% Patch 1 Patch Transdermal at bedtime  melatonin Liquid 3 milliGRAM(s) Oral at bedtime  multivitamin/minerals/iron Oral Solution (CENTRUM) 15 milliLiter(s) Oral daily  pantoprazole  Injectable 40 milliGRAM(s) IV Push daily  prednisoLONE acetate 1% Suspension 1 Drop(s) Both EYES four times a day  predniSONE  Solution 5 milliGRAM(s) Oral <User Schedule>  QUEtiapine 12.5 milliGRAM(s) Oral <User Schedule>  simethicone 80 milliGRAM(s) Chew every 6 hours  surgical lubricant sterile 1 Application(s) Topical every 8 hours  vancomycin    Solution 125 milliGRAM(s) Enteral Tube every 12 hours  zinc sulfate 220 milliGRAM(s) Oral two times a day    MEDICATIONS  (PRN):  calamine/zinc oxide Lotion 1 Application(s) Topical daily PRN Rash and/or Itching  HYDROmorphone  Injectable 1 milliGRAM(s) IV Push every 6 hours PRN Severe Pain (7 - 10)  HYDROmorphone  Injectable 0.5 milliGRAM(s) IV Push every 4 hours PRN Moderate Pain (4 - 6)  ondansetron Injectable 4 milliGRAM(s) IV Push every 8 hours PRN Nausea and/or Vomiting  sodium chloride 0.65% Nasal 1 Spray(s) Both Nostrils every 12 hours PRN Congestion      LABS:                        8.9    5.90  )-----------( 124      ( 19 Apr 2024 06:50 )             31.5     04-19    142  |  107  |  12  ----------------------------<  136<H>  4.4   |  27  |  <0.30<L>    Ca    9.8      19 Apr 2024 06:49  Phos  2.5     04-19  Mg     1.8     04-19    TPro  6.2  /  Alb  2.6<L>  /  TBili  1.8<H>  /  DBili  x   /  AST  52<H>  /  ALT  35  /  AlkPhos  56  04-19    PT/INR - ( 19 Apr 2024 06:50 )   PT: 14.2 sec;   INR: 1.30 ratio         PTT - ( 19 Apr 2024 06:50 )  PTT:32.4 sec  Urinalysis Basic - ( 19 Apr 2024 06:49 )    Color: x / Appearance: x / SG: x / pH: x  Gluc: 136 mg/dL / Ketone: x  / Bili: x / Urobili: x   Blood: x / Protein: x / Nitrite: x   Leuk Esterase: x / RBC: x / WBC x   Sq Epi: x / Non Sq Epi: x / Bacteria: x          CAPILLARY BLOOD GLUCOSE    MICROBIOLOGY:     RADIOLOGY:  [ ] Reviewed and interpreted by me    Mode: AC/ CMV (Assist Control/ Continuous Mandatory Ventilation)  RR (machine): 12  TV (machine): 350  FiO2: 30  PEEP: 5  ITime: 2  MAP: 13  PIP: 32

## 2024-04-20 NOTE — PROGRESS NOTE ADULT - PROBLEM SELECTOR PLAN 5
- Patient with Hx of RA on Enbrel as outpatient ( Currently being held)   - Outpatient Rheumatologist Dr. Egan   - Pt w/ sclera icterus c/w Active RA  - Seen by Rheum inpatient; recc outpatient follow up   - Cont prednisone and Hydromorphone prn pain - Patient with Hx of RA on Enbrel as outpatient ( Currently being held)   - Outpatient Rheumatologist Dr. Egan   - Pt w/ sclera icterus c/w Active RA  - Seen by Rheum inpatient; recc outpatient follow up   - Cont prednisone 5mg daily and Hydromorphone prn pain

## 2024-04-21 DIAGNOSIS — E27.49 OTHER ADRENOCORTICAL INSUFFICIENCY: ICD-10-CM

## 2024-04-21 DIAGNOSIS — E78.5 HYPERLIPIDEMIA, UNSPECIFIED: ICD-10-CM

## 2024-04-21 DIAGNOSIS — I10 ESSENTIAL (PRIMARY) HYPERTENSION: ICD-10-CM

## 2024-04-21 DIAGNOSIS — E03.9 HYPOTHYROIDISM, UNSPECIFIED: ICD-10-CM

## 2024-04-21 DIAGNOSIS — E11.9 TYPE 2 DIABETES MELLITUS WITHOUT COMPLICATIONS: ICD-10-CM

## 2024-04-21 DIAGNOSIS — E11.65 TYPE 2 DIABETES MELLITUS WITH HYPERGLYCEMIA: ICD-10-CM

## 2024-04-21 LAB
GLUCOSE BLDC GLUCOMTR-MCNC: 108 MG/DL — HIGH (ref 70–99)
GLUCOSE BLDC GLUCOMTR-MCNC: 184 MG/DL — HIGH (ref 70–99)
GLUCOSE BLDC GLUCOMTR-MCNC: 253 MG/DL — HIGH (ref 70–99)
GLUCOSE BLDC GLUCOMTR-MCNC: 265 MG/DL — HIGH (ref 70–99)
GLUCOSE BLDC GLUCOMTR-MCNC: 275 MG/DL — HIGH (ref 70–99)

## 2024-04-21 PROCEDURE — 99222 1ST HOSP IP/OBS MODERATE 55: CPT | Mod: GC

## 2024-04-21 RX ORDER — INSULIN HUMAN 100 [IU]/ML
5 INJECTION, SOLUTION SUBCUTANEOUS
Refills: 0 | Status: DISCONTINUED | OUTPATIENT
Start: 2024-04-21 | End: 2024-04-21

## 2024-04-21 RX ORDER — INSULIN LISPRO 100/ML
5 VIAL (ML) SUBCUTANEOUS
Refills: 0 | Status: DISCONTINUED | OUTPATIENT
Start: 2024-04-21 | End: 2024-04-22

## 2024-04-21 RX ORDER — ACETAMINOPHEN 500 MG
650 TABLET ORAL ONCE
Refills: 0 | Status: COMPLETED | OUTPATIENT
Start: 2024-04-21 | End: 2024-04-21

## 2024-04-21 RX ADMIN — Medication 3 MILLILITER(S): at 17:25

## 2024-04-21 RX ADMIN — Medication 3 MILLIGRAM(S): at 22:01

## 2024-04-21 RX ADMIN — Medication 15 MILLILITER(S): at 12:23

## 2024-04-21 RX ADMIN — Medication 5 MILLILITER(S): at 06:14

## 2024-04-21 RX ADMIN — SIMETHICONE 80 MILLIGRAM(S): 80 TABLET, CHEWABLE ORAL at 00:29

## 2024-04-21 RX ADMIN — Medication 3 MILLILITER(S): at 11:20

## 2024-04-21 RX ADMIN — SIMETHICONE 80 MILLIGRAM(S): 80 TABLET, CHEWABLE ORAL at 06:13

## 2024-04-21 RX ADMIN — LIDOCAINE 1 PATCH: 4 CREAM TOPICAL at 22:00

## 2024-04-21 RX ADMIN — Medication 650 MILLIGRAM(S): at 22:05

## 2024-04-21 RX ADMIN — Medication 125 MILLIGRAM(S): at 19:06

## 2024-04-21 RX ADMIN — DICLOFENAC SODIUM 4 GRAM(S): 30 GEL TOPICAL at 06:13

## 2024-04-21 RX ADMIN — ZINC SULFATE TAB 220 MG (50 MG ZINC EQUIVALENT) 220 MILLIGRAM(S): 220 (50 ZN) TAB at 18:53

## 2024-04-21 RX ADMIN — GABAPENTIN 100 MILLIGRAM(S): 400 CAPSULE ORAL at 21:59

## 2024-04-21 RX ADMIN — Medication 1 DROP(S): at 12:23

## 2024-04-21 RX ADMIN — CHLORHEXIDINE GLUCONATE 1 APPLICATION(S): 213 SOLUTION TOPICAL at 06:14

## 2024-04-21 RX ADMIN — Medication 1 DROP(S): at 18:54

## 2024-04-21 RX ADMIN — SIMETHICONE 80 MILLIGRAM(S): 80 TABLET, CHEWABLE ORAL at 18:53

## 2024-04-21 RX ADMIN — Medication 87.5 MICROGRAM(S): at 06:13

## 2024-04-21 RX ADMIN — DICLOFENAC SODIUM 4 GRAM(S): 30 GEL TOPICAL at 00:41

## 2024-04-21 RX ADMIN — CHLORHEXIDINE GLUCONATE 15 MILLILITER(S): 213 SOLUTION TOPICAL at 06:14

## 2024-04-21 RX ADMIN — Medication 6: at 12:24

## 2024-04-21 RX ADMIN — LIDOCAINE 1 PATCH: 4 CREAM TOPICAL at 10:29

## 2024-04-21 RX ADMIN — CHLORHEXIDINE GLUCONATE 15 MILLILITER(S): 213 SOLUTION TOPICAL at 18:53

## 2024-04-21 RX ADMIN — Medication 5 MILLILITER(S): at 12:22

## 2024-04-21 RX ADMIN — Medication 1 APPLICATION(S): at 06:14

## 2024-04-21 RX ADMIN — Medication 1 APPLICATION(S): at 21:59

## 2024-04-21 RX ADMIN — Medication 6: at 18:54

## 2024-04-21 RX ADMIN — INSULIN GLARGINE 12 UNIT(S): 100 INJECTION, SOLUTION SUBCUTANEOUS at 19:40

## 2024-04-21 RX ADMIN — ZINC SULFATE TAB 220 MG (50 MG ZINC EQUIVALENT) 220 MILLIGRAM(S): 220 (50 ZN) TAB at 06:13

## 2024-04-21 RX ADMIN — Medication 2000 UNIT(S): at 12:22

## 2024-04-21 RX ADMIN — GABAPENTIN 100 MILLIGRAM(S): 400 CAPSULE ORAL at 09:15

## 2024-04-21 RX ADMIN — SIMETHICONE 80 MILLIGRAM(S): 80 TABLET, CHEWABLE ORAL at 12:23

## 2024-04-21 RX ADMIN — Medication 1 APPLICATION(S): at 06:13

## 2024-04-21 RX ADMIN — Medication 3 MILLILITER(S): at 05:30

## 2024-04-21 RX ADMIN — Medication 1 APPLICATION(S): at 15:47

## 2024-04-21 RX ADMIN — Medication 650 MILLIGRAM(S): at 23:00

## 2024-04-21 RX ADMIN — Medication 125 MILLIGRAM(S): at 06:13

## 2024-04-21 RX ADMIN — QUETIAPINE FUMARATE 12.5 MILLIGRAM(S): 200 TABLET, FILM COATED ORAL at 13:25

## 2024-04-21 RX ADMIN — Medication 5 MILLILITER(S): at 00:40

## 2024-04-21 RX ADMIN — DICLOFENAC SODIUM 4 GRAM(S): 30 GEL TOPICAL at 15:40

## 2024-04-21 RX ADMIN — Medication 5 UNIT(S): at 18:55

## 2024-04-21 RX ADMIN — Medication 1 DROP(S): at 00:28

## 2024-04-21 RX ADMIN — Medication 5 MILLIGRAM(S): at 09:32

## 2024-04-21 RX ADMIN — Medication 500 MILLIGRAM(S): at 12:22

## 2024-04-21 RX ADMIN — Medication 3 MILLILITER(S): at 23:18

## 2024-04-21 RX ADMIN — Medication 1 DROP(S): at 06:12

## 2024-04-21 RX ADMIN — Medication 5 MILLILITER(S): at 18:52

## 2024-04-21 RX ADMIN — DICLOFENAC SODIUM 4 GRAM(S): 30 GEL TOPICAL at 18:56

## 2024-04-21 RX ADMIN — AMLODIPINE BESYLATE 10 MILLIGRAM(S): 2.5 TABLET ORAL at 09:15

## 2024-04-21 RX ADMIN — PANTOPRAZOLE SODIUM 40 MILLIGRAM(S): 20 TABLET, DELAYED RELEASE ORAL at 12:23

## 2024-04-21 RX ADMIN — ESCITALOPRAM OXALATE 10 MILLIGRAM(S): 10 TABLET, FILM COATED ORAL at 18:53

## 2024-04-21 RX ADMIN — Medication 1 APPLICATION(S): at 13:00

## 2024-04-21 RX ADMIN — Medication 1 DROP(S): at 18:53

## 2024-04-21 NOTE — CONSULT NOTE ADULT - ATTENDING COMMENTS
73 yo F PMH T2DM on insulin, HTN, HLD, hypothyroidism, RA on Prednisone, Fibromyalgia, cerebral aneurysm s/p repair, acute hypercapnic respiratory failure s/p trach (vent dependent) and PEG (10/22), bedbound presented from home with complaints of possible G tube dislodgement and redness around the site, admitted for further work up, found to be bacteremic with serratia marcescens during admission, consulted for rule out endophthalmitis and bilateral scleral injection.    # Rule out endophthalmitis   - 74F with above medical hx was found to be bacteremic with serratia marcescens, denying blurry vision, only reporting burning of both eyes   - FARAZ VA 2/2 trach, IOP wnl OU, no rAPD OU, EOMs full  - Anterior exam with no hypopyon DFE with no signs of vitritis OU  - No evidence of endophthalmitis at this time    # Injection OU likely 2/2 scleritis vs topical tobra irritation  - Pt with hx of RA and sjogrens has bilateral scleral injection that is sectoral, no signs of conjunctivitis   - Possibly due to ocular surface dryness however cannot rule out RA related scleritis given appearance  - Will optimize ocular surface and continue to monitor closely for now, discussed with family associated rheum process.   - If persists with ocular surface tx recommend rheumatology consult   - Start erythromycin ointment to both eyes TID (with last application of the day being at bedtime) - ordered  - Start preservative free artificial tears QID OU - ordered  - STOP home tobramycin gtt - no signs of active bacterial infection (discontinued)  - Ophtho to follow
74yoF h/o T2DM, HTN, HLD, anemia, hypothyroidism, RA, fibromyalgia, remote cerebral aneurysm repair, acute hypercapnic respiratory failure with tracheostomy, PEG tube (initially placed 2022), and bedbound, recurrent C diff presented for PEG tube dislodgment. Admitted 4/2/24    Recurrent C diff - first episode Aug 2023 treated with tapering PO vanco, recurrent episode 1/31/24 treated with PO vanco and then fidaxomicin completed in Feb - most recently tested positive 3/20/24 seen by Dr. Newman 3/29 outpatient, started on fidaxomicin that day - tube feeds concurrently held, unclear if improving afterwards per family  Chronic sacral decubitus wound  3/20 Klebisella bacteruria R only to amp and sputum few Pseudomonas R to FQs w/o polys on gram stian - treatment of urine was deferred to avoid exacerbating C diff    Tmax 100, no leukocytosis  Concern for cellulitis around PEG tube site - area with very minimal erythema, remarkable receded from skin paula placed on admission  UA 11 WBC  CT a/p: no infectious focus or colitis  MRSA PCR+    4/3 IR - PEG exchanged bedside to 20 Fr [facility]  - Bedside XR demonstrates appropriately positioned G-tube with contrast filling into the stomach and bowel.    Blood and urine cultures pending - obtained after vancomycin administered    Antibiotics  Meropenem 4/2 -->4/3  IV Vanco 4/2  Fdaxomicin 4/3-->    PEG site no obvious cellulitis - probably more irritation from displacement/leaked contents, no indication for antimicrobials.    Suggest:  - will continue fidaxomicin for 5 more days (total 10 day course) - will order  - otherwise monitor off additional Abx  - monitor bowel movements/output
74-yr-old woman s/p Trach and PEG admitted for re-instituting the dislodged PEG tube seen in consult for anemia and coagulopathy. Patient hospital course has been complicated by sepsis which itself and its treatment has worsened her anemia and coagulation panel. Agree with the parenteral Vit-K for coagulopathy and transfusion support to HGB >8 during sepsis or if there are symptoms of anemia. Other management as per primary team.
Agree with assessment and plan as above by Dr. Roberts. Reviewed all pertinent labs, glucose values, and imaging studies. Modifications made as indicated above. Pt. with uncontrolled Type 2 DM on tube feeds. Resume basal + bolus insulin regimen with tube feeds as above. Will adjust as needed based on insulin requirements for goal glucose 100-180. c/w levothyroxine. Can repeat TFTs as outpatient.    Cesar Ingram D.O  [phone]

## 2024-04-21 NOTE — PROGRESS NOTE ADULT - SUBJECTIVE AND OBJECTIVE BOX
Patient is a 74y old  Female who presents with a chief complaint of Dislodged G Tube (20 Apr 2024 11:50)      Interval Events:    REVIEW OF SYSTEMS:  [ ] Positive  [ ] All other systems negative  [ ] Unable to assess ROS because ________    Vital Signs Last 24 Hrs  T(C): 36.7 (04-21-24 @ 05:37), Max: 36.9 (04-20-24 @ 09:36)  T(F): 98.1 (04-21-24 @ 05:37), Max: 98.4 (04-20-24 @ 09:36)  HR: 78 (04-21-24 @ 05:40) (9 - 99)  BP: 145/79 (04-21-24 @ 05:37) (117/71 - 146/65)  RR: 15 (04-21-24 @ 05:37) (15 - 23)  SpO2: 100% (04-21-24 @ 05:40) (97% - 100%)    PHYSICAL EXAM:  HEENT:   [ ]Tracheostomy:  [ ]Pupils equal  [ ]No oral lesions  [ ]Abnormal    SKIN  [ ]No Rash  [ ] Abnormal  [ ] pressure    CARDIAC  [ ]Regular  [ ]Abnormal    PULMONARY  [ ]Bilateral Clear Breath Sounds  [ ]Normal Excursion  [ ]Abnormal    GI  [ ]PEG      [ ] +BS		              [ ]Soft, nondistended, nontender	  [ ]Abnormal    MUSCULOSKELETAL                                   [ ]Bedbound                 [ ]Abnormal    [ ]Ambulatory/OOB to chair                           EXTREMITIES                                         [ ]Normal  [ ]Edema                           NEUROLOGIC  [ ] Normal, non focal  [ ] Focal findings:    PSYCHIATRIC  [ ]Alert and appropriate  [ ] Sedated	 [ ]Agitated    :  Mcbride: [ ] Yes, if yes: Date of Placement:                   [  ] No    LINES: Central Lines [ ] Yes, if yes: Date of Placement                                     [  ] No    HOSPITAL MEDICATIONS:  MEDICATIONS  (STANDING):  albuterol/ipratropium for Nebulization 3 milliLiter(s) Nebulizer every 6 hours  amLODIPine   Tablet 10 milliGRAM(s) Oral <User Schedule>  artificial tears (preservative free) Ophthalmic Solution 1 Drop(s) Both EYES four times a day  ascorbic acid 500 milliGRAM(s) Oral daily  Biotene Dry Mouth Oral Rinse 5 milliLiter(s) Swish and Spit every 6 hours  chlorhexidine 0.12% Liquid 15 milliLiter(s) Oral Mucosa every 12 hours  chlorhexidine 4% Liquid 1 Application(s) Topical <User Schedule>  cholecalciferol 2000 Unit(s) Oral daily  diclofenac sodium 1% Gel 4 Gram(s) Topical four times a day  erythromycin   Ointment 1 Application(s) Both EYES three times a day  escitalopram 10 milliGRAM(s) Oral <User Schedule>  fentaNYL   Patch  25 MICROgram(s)/Hr 1 Patch Transdermal every 72 hours  gabapentin Solution 100 milliGRAM(s) Oral <User Schedule>  insulin glargine Injectable (LANTUS) 12 Unit(s) SubCutaneous <User Schedule>  insulin lispro (ADMELOG) corrective regimen sliding scale   SubCutaneous every 6 hours  levothyroxine Injectable 87.5 MICROGram(s) IV Push <User Schedule>  lidocaine   4% Patch 1 Patch Transdermal at bedtime  lidocaine   4% Patch 1 Patch Transdermal at bedtime  melatonin Liquid 3 milliGRAM(s) Oral at bedtime  multivitamin/minerals/iron Oral Solution (CENTRUM) 15 milliLiter(s) Oral daily  pantoprazole  Injectable 40 milliGRAM(s) IV Push daily  prednisoLONE acetate 1% Suspension 1 Drop(s) Both EYES four times a day  predniSONE  Solution 5 milliGRAM(s) Oral <User Schedule>  QUEtiapine 12.5 milliGRAM(s) Oral <User Schedule>  simethicone 80 milliGRAM(s) Chew every 6 hours  surgical lubricant sterile 1 Application(s) Topical every 8 hours  vancomycin    Solution 125 milliGRAM(s) Enteral Tube every 12 hours  zinc sulfate 220 milliGRAM(s) Oral two times a day    MEDICATIONS  (PRN):  calamine/zinc oxide Lotion 1 Application(s) Topical daily PRN Rash and/or Itching  ondansetron Injectable 4 milliGRAM(s) IV Push every 8 hours PRN Nausea and/or Vomiting  oxyCODONE    Solution 5 milliGRAM(s) Enteral Tube every 4 hours PRN Moderate Pain (4 - 6)  oxyCODONE    Solution 10 milliGRAM(s) Enteral Tube every 6 hours PRN Severe Pain (7 - 10)  sodium chloride 0.65% Nasal 1 Spray(s) Both Nostrils every 12 hours PRN Congestion      LABS:                  CAPILLARY BLOOD GLUCOSE    MICROBIOLOGY:     RADIOLOGY:  [ ] Reviewed and interpreted by me    Mode: AC/ CMV (Assist Control/ Continuous Mandatory Ventilation)  RR (machine): 12  TV (machine): 350  FiO2: 30  PEEP: 5  ITime: 1  MAP: 8  PIP: 30   Patient is a 74y old  Female who presents with a chief complaint of Dislodged G Tube (20 Apr 2024 11:50)      Interval Events:  No acute events overnight.     REVIEW OF SYSTEMS:  [ ] Positive  [X] All other systems negative  [ ] Unable to assess ROS because ________    Vital Signs Last 24 Hrs  T(C): 36.7 (04-21-24 @ 05:37), Max: 36.9 (04-20-24 @ 09:36)  T(F): 98.1 (04-21-24 @ 05:37), Max: 98.4 (04-20-24 @ 09:36)  HR: 78 (04-21-24 @ 05:40) (9 - 99)  BP: 145/79 (04-21-24 @ 05:37) (117/71 - 146/65)  RR: 15 (04-21-24 @ 05:37) (15 - 23)  SpO2: 100% (04-21-24 @ 05:40) (97% - 100%)    PHYSICAL EXAM:  HEENT:   [X]Tracheostomy: #8 distal XLT cuffed shiley   [X]Pupils equal  [ ]No oral lesions  [ ]Abnormal    SKIN  [ ]No Rash  [ ] Abnormal  [X] pressure - sacral DTI    CARDIAC  [X]Regular  [ ]Abnormal    PULMONARY  [X]Bilateral Clear Breath Sounds  [ ]Normal Excursion  [ ]Abnormal    GI  [X]PEG      [X] +BS		              [X]Soft, nondistended, nontender	  [X]Abnormal - old peg site w/ dressing c/d/i     MUSCULOSKELETAL                                   [X]Bedbound                 [ ]Abnormal    [ ]Ambulatory/OOB to chair                           EXTREMITIES                                         [X]Normal  [ ]Edema                           NEUROLOGIC  [ ] Normal, non focal  [X] Focal findings: awake, alert, following commands on all extremities     PSYCHIATRIC  [X]Alert and appropriate  [ ] Sedated	 [ ]Agitated    :  Mcbride: [ ] Yes, if yes: Date of Placement:                   [X] No    LINES: Central Lines [ ] Yes, if yes: Date of Placement                                     [X] No    HOSPITAL MEDICATIONS:  MEDICATIONS  (STANDING):  albuterol/ipratropium for Nebulization 3 milliLiter(s) Nebulizer every 6 hours  amLODIPine   Tablet 10 milliGRAM(s) Oral <User Schedule>  artificial tears (preservative free) Ophthalmic Solution 1 Drop(s) Both EYES four times a day  ascorbic acid 500 milliGRAM(s) Oral daily  Biotene Dry Mouth Oral Rinse 5 milliLiter(s) Swish and Spit every 6 hours  chlorhexidine 0.12% Liquid 15 milliLiter(s) Oral Mucosa every 12 hours  chlorhexidine 4% Liquid 1 Application(s) Topical <User Schedule>  cholecalciferol 2000 Unit(s) Oral daily  diclofenac sodium 1% Gel 4 Gram(s) Topical four times a day  erythromycin   Ointment 1 Application(s) Both EYES three times a day  escitalopram 10 milliGRAM(s) Oral <User Schedule>  fentaNYL   Patch  25 MICROgram(s)/Hr 1 Patch Transdermal every 72 hours  gabapentin Solution 100 milliGRAM(s) Oral <User Schedule>  insulin glargine Injectable (LANTUS) 12 Unit(s) SubCutaneous <User Schedule>  insulin lispro (ADMELOG) corrective regimen sliding scale   SubCutaneous every 6 hours  levothyroxine Injectable 87.5 MICROGram(s) IV Push <User Schedule>  lidocaine   4% Patch 1 Patch Transdermal at bedtime  lidocaine   4% Patch 1 Patch Transdermal at bedtime  melatonin Liquid 3 milliGRAM(s) Oral at bedtime  multivitamin/minerals/iron Oral Solution (CENTRUM) 15 milliLiter(s) Oral daily  pantoprazole  Injectable 40 milliGRAM(s) IV Push daily  prednisoLONE acetate 1% Suspension 1 Drop(s) Both EYES four times a day  predniSONE  Solution 5 milliGRAM(s) Oral <User Schedule>  QUEtiapine 12.5 milliGRAM(s) Oral <User Schedule>  simethicone 80 milliGRAM(s) Chew every 6 hours  surgical lubricant sterile 1 Application(s) Topical every 8 hours  vancomycin    Solution 125 milliGRAM(s) Enteral Tube every 12 hours  zinc sulfate 220 milliGRAM(s) Oral two times a day    MEDICATIONS  (PRN):  calamine/zinc oxide Lotion 1 Application(s) Topical daily PRN Rash and/or Itching  ondansetron Injectable 4 milliGRAM(s) IV Push every 8 hours PRN Nausea and/or Vomiting  oxyCODONE    Solution 5 milliGRAM(s) Enteral Tube every 4 hours PRN Moderate Pain (4 - 6)  oxyCODONE    Solution 10 milliGRAM(s) Enteral Tube every 6 hours PRN Severe Pain (7 - 10)  sodium chloride 0.65% Nasal 1 Spray(s) Both Nostrils every 12 hours PRN Congestion    LABS:    CAPILLARY BLOOD GLUCOSE    MICROBIOLOGY:     RADIOLOGY:  [ ] Reviewed and interpreted by me    Mode: AC/ CMV (Assist Control/ Continuous Mandatory Ventilation)  RR (machine): 12  TV (machine): 350  FiO2: 30  PEEP: 5  ITime: 1  MAP: 8  PIP: 30

## 2024-04-21 NOTE — PROGRESS NOTE ADULT - PROBLEM SELECTOR PLAN 10
- Dc planning to home   - CM working with insurance company and daughter to determine post dc care - Dc planning to home Thursday 4/25   - CM working with Home care agencies / DME Company to ensure services and supplies upon Dc

## 2024-04-21 NOTE — PROGRESS NOTE ADULT - PROBLEM SELECTOR PLAN 1
- Patient p/w PEG tube dislodgement on admission   - CT A/P 4/2: Dislodged G tube with balloon in the anterior abdominal wall with air filled track to stomach  - S/p bedside exchange by IR on 4/3 ( # 20 Fr Kangaroo EnFit placed )  - 4/4: PEG leaking; IR adjusted PEG at bedside  - 4/5: PEG still leaking, adjusted at bedside by IR Attending.  - 4/8-4/9 IR aborted procedure; GI Dr. Cleaning consulted for new PEG  - 4/9 PEG fell out spontaneously, Mcbride placed into tract   - 4/12: Mcbride replaced with 18Fr PEG at bedside, bumper at 8cm (intentionally loose)  - CT A/P 4/12: G-tube in the stomach/Mild inflammation thickening along tract   - 4/14 18 fr PEG was used, feeds were re-initiated, attempt failed, PEG with copious amounts of leakage  - 4/16 S/p new PEG-J and closure of gastric fistula in endo suite with GI and surgery  - 4/20: Tube feeds advanced 50ml/hr, goal at 55ml/hr over 18hrs. - Patient p/w PEG tube dislodgement on admission   - CT A/P 4/2: Dislodged G tube with balloon in the anterior abdominal wall with air filled track to stomach  - S/p bedside exchange by IR on 4/3 ( # 20 Fr Kangaroo EnFit placed )  - 4/4: PEG leaking; IR adjusted PEG at bedside  - 4/5: PEG still leaking, adjusted at bedside by IR Attending.  - 4/8-4/9 IR aborted procedure; GI Dr. Cleaning consulted for new PEG  - 4/9 PEG fell out spontaneously, Mcbride placed into tract   - 4/12: Mcbride replaced with 18Fr PEG at bedside, bumper at 8cm (intentionally loose)  - CT A/P 4/12: G-tube in the stomach/Mild inflammation thickening along tract   - 4/14 18 fr PEG was used, feeds were re-initiated, attempt failed, PEG with copious amounts of leakage  - 4/16 S/p new PEG-J and closure of gastric fistula in endo suite with GI and surgery  - Patient currently tolerating TFs at goal rate

## 2024-04-21 NOTE — PROGRESS NOTE ADULT - PROBLEM SELECTOR PLAN 2
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  F/u ABG appropriate.  - PS weaning as tolerated.   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   - Chest PT, Duonebs q 6 hrs

## 2024-04-21 NOTE — PROGRESS NOTE ADULT - PROBLEM SELECTOR PLAN 4
- IV Vanco/meropenem x1 on admission for concern for cellulitis  - 4/2 blood cultures negative x2, MRSA/MSSA PCR +  - 4/3 urine culture nl vinay, CT A/P 4/2: no infectious focus or colitis  - 4/5 Procal 6.6 increased from 0.08 on 4/3, lactate 4.3  - 4/7 Fever, meropenem restarted - no leukocytosis - RVP neg, UA neg, dopplers neg  - 4/7 blood cultures positive for serratia - Meropenem 4/2-4/3 and 4/7-4/11, switched to cefepime 4/11-4/14  - Currently remains Afebrile w/o Leukocytosis - IV Vanco/meropenem x1 on admission for concern for cellulitis  - 4/2 blood cultures negative x2, MRSA/MSSA PCR +  - 4/3 urine culture nl vinay, CT A/P 4/2: no infectious focus or colitis  - 4/5 Procal 6.6 increased from 0.08 on 4/3, lactate 4.3  - 4/7 Fever, meropenem restarted - no leukocytosis - RVP neg, UA neg, dopplers neg  - 4/7 blood cultures positive for serratia; txd w/ Meropenem 4/2-4/3 and 4/7-4/11, switched to cefepime 4/11-4/14  - Currently remains Afebrile w/o Leukocytosis

## 2024-04-21 NOTE — PROGRESS NOTE ADULT - PROBLEM SELECTOR PLAN 9
- Daughter interested in patient having pleasure feeds by mouth.  - Daughter states patient was cleared by outpatient Pulmonologist to have small amounts of liquids orally  - Daughter informed that patient will need swallow evaluation while inpatient to assess aspiration risk   - Previously Explained that a swallow evaluation would further inform her of appropriate texture to decrease risk of aspiration if patient is appropriate. However if patient fails evaluation and is not cleared for an oral diet  - Daughter would be assuming risks of aspiration complications if she chooses to feed patient by mouth  - Once patient is more stable the plan is to proceed with FEESST  - Daughter provided with medical update over the phone today  - Code Status: DNR - Daughter interested in patient having pleasure feeds by mouth.  - Scheduled for FEES Tomorrow   - Daughter provided with medical update over the phone today as well as Father at bedside   - Code Status: DNR

## 2024-04-21 NOTE — CONSULT NOTE ADULT - SUBJECTIVE AND OBJECTIVE BOX
Diagnosis:  Sarcoma    Current Treatment/Plan of Care: Paclitaxel plus gemcitabine    Provider: Isabel    Last Provider visit:  10/25/2023    Next follow up visit:  11/1/2023     Patient calling the office to report/Today's Concern:   Return call placed to patient who reports that she does not feel that she received heparin after her infusion yesterday due to reaction that she experienced.  Patient reports that she need to have TPA instilled and wonders if she needs to come in tomorrow for heparin to her line.     Heparin was not instilled into line and patient will be scheduled for return visit tomorrow to have her port accessed and line flushed with heparin.     Message to PSR II to schedule                 HPI:  75 yo F PMH T2DM on insulin, HTN, HLD, hypothyroidism, RA on Prednisone, Fibromyalgia, cerebral aneurysm s/p repair, acute hypercapnic respiratory failure s/p trach (vent dependent) and PEG (10/22), bedbound presented from home with complaints of possible G tube dislodgement and redness around the site.  Site is red with small amount of pus at insertion site.  Tender to palpation, warm to touch. CT Abdomen/Pelvis done showing dislodgement of G tube with balloon in the anterior abdominal wall with air filled track to stomach.  GI contacted, but unable to replace at bedside given placement of balloon. Surgery consulted--recommend gastrograffin study to eval placement of PEG and possible IR replacement in AM. Given Vancomycin 1 gm IV x 1 due to concern for possible cellulitis.  Of note, patient hemodynamically stable, afebrile, without leukocytosis on presentation.     Per patient's son at bedside, patient currently being treated for C-diff.  Known infection for past 2-3 weeks.  Treated by Dr. Zion Newman, currently receiving Fidaxomicin.  Also report recent UTI--not currently being treated.    Admitted to MICU for ventilator support and treatment of dislodged PEG/abdominal wall cellulitis.  (02 Apr 2024 23:07)      DIABETES HX:  - History/diagnosis: T2DM x20 years, thought to be related to chronic steroid use  - Microvascular complications:   [  ] nephropathy, [  ] retinopathy, [  ] neuropathy  - Macrovascular complications: [  ] CAD,  [  ] CVA  - Follows with: Dr Rupali Ruth, endocrinology.   - A1C with Estimated Average Glucose Result: 6.2 % (04-04-24)  - Home regimen: Patient is on TF from 6AM-6PM. Takes Lantus 15 units qhs and regular insulin 10 units at 6AM, 12 units at 12PM, and 6 units at 6PM. She is on prednisone 5mg daily for RA  - Adherence: family and aides help with insulin administration  - Previous meds: previously was on metformin  - Blood sugar: pt was recently prescribed a DEXCOM, unsure if using, data not available to review at this time  - Diet/lifestyle: bedbound on Tube feeds. 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 Takes Lantus 15 units qhs and regular insulin 10 units at 6AM, 12 units at 12PM, and 6 units at 6PM. She is on prednisone 5mg daily for RA. Patient also takes LT4 125 mcg daily for hypothyroidism    MEDICATIONS  (STANDING):  albuterol/ipratropium for Nebulization 3 milliLiter(s) Nebulizer every 6 hours  amLODIPine   Tablet 10 milliGRAM(s) Oral <User Schedule>  artificial tears (preservative free) Ophthalmic Solution 1 Drop(s) Both EYES four times a day  ascorbic acid 500 milliGRAM(s) Oral daily  Biotene Dry Mouth Oral Rinse 5 milliLiter(s) Swish and Spit every 6 hours  chlorhexidine 0.12% Liquid 15 milliLiter(s) Oral Mucosa every 12 hours  chlorhexidine 4% Liquid 1 Application(s) Topical <User Schedule>  cholecalciferol 2000 Unit(s) Oral daily  diclofenac sodium 1% Gel 4 Gram(s) Topical four times a day  erythromycin   Ointment 1 Application(s) Both EYES three times a day  escitalopram 10 milliGRAM(s) Oral <User Schedule>  fentaNYL   Patch  25 MICROgram(s)/Hr 1 Patch Transdermal every 72 hours  gabapentin Solution 100 milliGRAM(s) Oral <User Schedule>  insulin glargine Injectable (LANTUS) 12 Unit(s) SubCutaneous <User Schedule>  insulin lispro (ADMELOG) corrective regimen sliding scale   SubCutaneous every 6 hours  levothyroxine Injectable 87.5 MICROGram(s) IV Push <User Schedule>  lidocaine   4% Patch 1 Patch Transdermal at bedtime  lidocaine   4% Patch 1 Patch Transdermal at bedtime  melatonin Liquid 3 milliGRAM(s) Oral at bedtime  multivitamin/minerals/iron Oral Solution (CENTRUM) 15 milliLiter(s) Oral daily  pantoprazole  Injectable 40 milliGRAM(s) IV Push daily  prednisoLONE acetate 1% Suspension 1 Drop(s) Both EYES four times a day  predniSONE  Solution 5 milliGRAM(s) Oral <User Schedule>  QUEtiapine 12.5 milliGRAM(s) Oral <User Schedule>  simethicone 80 milliGRAM(s) Chew every 6 hours  surgical lubricant sterile 1 Application(s) Topical every 8 hours  vancomycin    Solution 125 milliGRAM(s) Enteral Tube every 12 hours  zinc sulfate 220 milliGRAM(s) Oral two times a day    MEDICATIONS  (PRN):  calamine/zinc oxide Lotion 1 Application(s) Topical daily PRN Rash and/or Itching  ondansetron Injectable 4 milliGRAM(s) IV Push every 8 hours PRN Nausea and/or Vomiting  oxyCODONE    Solution 5 milliGRAM(s) Enteral Tube every 4 hours PRN Moderate Pain (4 - 6)  oxyCODONE    Solution 10 milliGRAM(s) Enteral Tube every 6 hours PRN Severe Pain (7 - 10)  sodium chloride 0.65% Nasal 1 Spray(s) Both Nostrils every 12 hours PRN Congestion      Allergies    pineapple (Unknown)  Tagamet (Unknown)  heparin (Unknown)  walnut (Unknown)  metronidazole (Rash)  penicillin (Unknown)  Lyrica (Unknown)  meropenem (Rash)  Pecan, Filbert, Hazelnut (Unknown)    Intolerances      Review of Systems:  Constitutional: No fever  Eyes: No blurry vision  Neuro: No tremors  HEENT: No pain  Cardiovascular: No chest pain, palpitations  Respiratory: No SOB, no cough  GI: No nausea, vomiting, abdominal pain  : No dysuria  Skin: no rash  Psych: no depression  Endocrine: no polyuria, polydipsia  Hem/lymph: no swelling  Osteoporosis: no fractures    ALL OTHER SYSTEMS REVIEWED AND NEGATIVE    PHYSICAL EXAM:  VITALS: T(C): 36.4 (04-21-24 @ 10:00)  T(F): 97.6 (04-21-24 @ 10:00), Max: 98.2 (04-20-24 @ 18:07)  HR: 88 (04-21-24 @ 10:00) (9 - 99)  BP: 123/59 (04-21-24 @ 10:00) (117/71 - 146/65)  RR:  (15 - 30)  SpO2:  (97% - 100%)  Wt(kg): --  GENERAL: NAD, well-groomed, well-developed  EYES: No proptosis, no lid lag, anicteric  HEENT:  Atraumatic, Normocephalic, moist mucous membranes  RESPIRATORY: Normal respiratory effort; no audible wheezing  SKIN: Dry, intact, No rashes or lesions  MUSCULOSKELETAL: Full range of motion, normal strength  NEURO: sensation intact, extraocular movements intact, no tremor  PSYCH: Alert and oriented x 3, normal affect, normal mood  CUSHING'S SIGNS: no striae                          8.9    5.90  )-----------( 124      ( 19 Apr 2024 06:50 )             31.5       04-19    142  |  107  |  12  ----------------------------<  136<H>  4.4   |  27  |  <0.30<L>    eGFR: 113    Ca    9.8      04-19  Mg     1.8     04-19  Phos  2.5     04-19    TPro  6.2  /  Alb  2.6<L>  /  TBili  1.8<H>  /  DBili  x   /  AST  52<H>  /  ALT  35  /  AlkPhos  56  04-19      A1C with Estimated Average Glucose Result: 6.2 % (04-04-24 @ 07:40)  A1C with Estimated Average Glucose Result: 7.5 % (10-28-23 @ 07:18)      CAPILLARY BLOOD GLUCOSE      POCT Blood Glucose.: 108 mg/dL (21 Apr 2024 05:31)  POCT Blood Glucose.: 184 mg/dL (21 Apr 2024 00:14)  POCT Blood Glucose.: 364 mg/dL (20 Apr 2024 17:52)  POCT Blood Glucose.: 243 mg/dL (20 Apr 2024 12:01)      Thyroid Function Tests:  albuterol/ipratropium for Nebulization 3 milliLiter(s) Nebulizer every 6 hours  amLODIPine   Tablet 10 milliGRAM(s) Oral <User Schedule>  artificial tears (preservative free) Ophthalmic Solution 1 Drop(s) Both EYES four times a day  ascorbic acid 500 milliGRAM(s) Oral daily  Biotene Dry Mouth Oral Rinse 5 milliLiter(s) Swish and Spit every 6 hours  calamine/zinc oxide Lotion 1 Application(s) Topical daily PRN  chlorhexidine 0.12% Liquid 15 milliLiter(s) Oral Mucosa every 12 hours  chlorhexidine 4% Liquid 1 Application(s) Topical <User Schedule>  cholecalciferol 2000 Unit(s) Oral daily  diclofenac sodium 1% Gel 4 Gram(s) Topical four times a day  erythromycin   Ointment 1 Application(s) Both EYES three times a day  escitalopram 10 milliGRAM(s) Oral <User Schedule>  fentaNYL   Patch  25 MICROgram(s)/Hr 1 Patch Transdermal every 72 hours  gabapentin Solution 100 milliGRAM(s) Oral <User Schedule>  insulin glargine Injectable (LANTUS) 12 Unit(s) SubCutaneous <User Schedule>  insulin lispro (ADMELOG) corrective regimen sliding scale   SubCutaneous every 6 hours  levothyroxine Injectable 87.5 MICROGram(s) IV Push <User Schedule>  lidocaine   4% Patch 1 Patch Transdermal at bedtime  lidocaine   4% Patch 1 Patch Transdermal at bedtime  melatonin Liquid 3 milliGRAM(s) Oral at bedtime  multivitamin/minerals/iron Oral Solution (CENTRUM) 15 milliLiter(s) Oral daily  ondansetron Injectable 4 milliGRAM(s) IV Push every 8 hours PRN  oxyCODONE    Solution 5 milliGRAM(s) Enteral Tube every 4 hours PRN  oxyCODONE    Solution 10 milliGRAM(s) Enteral Tube every 6 hours PRN  pantoprazole  Injectable 40 milliGRAM(s) IV Push daily  prednisoLONE acetate 1% Suspension 1 Drop(s) Both EYES four times a day  predniSONE  Solution 5 milliGRAM(s) Oral <User Schedule>  QUEtiapine 12.5 milliGRAM(s) Oral <User Schedule>  simethicone 80 milliGRAM(s) Chew every 6 hours  sodium chloride 0.65% Nasal 1 Spray(s) Both Nostrils every 12 hours PRN  surgical lubricant sterile 1 Application(s) Topical every 8 hours  vancomycin    Solution 125 milliGRAM(s) Enteral Tube every 12 hours  zinc sulfate 220 milliGRAM(s) Oral two times a day      04-04 Chol 130 Direct LDL -- LDL calculated 63 HDL 20<L> Trig 295<H>    Radiology:

## 2024-04-21 NOTE — PROGRESS NOTE ADULT - NS ATTEND AMEND GEN_ALL_CORE FT
73 y/o F w/chronic respiratory failure with hypoxia and hypercapnia s/p trach/PEG (vent dependent), RA on prednisone, DM, cerebral aneurysm s/p repair, and recurrent C. Diff infection admitted for dislodged PEG tube. Now s/p replacement of G-J tube via GI/surgery.     - tolerating TF  - Continue mechanical ventilation  - PO vancomycin per ID for hx of cdiff  - DVT prophylaxis  - DNR/DNI  - Dispo planning, prior discussions with plan for dc home 73 y/o F w/chronic respiratory failure with hypoxia and hypercapnia s/p trach/PEG (vent dependent), RA on prednisone, DM, cerebral aneurysm s/p repair, and recurrent C. Diff infection admitted for dislodged PEG tube. Now s/p replacement of G-J tube via GI/surgery.     - tolerating TF, no significant leakage of tube feeds from other stoma site  - Continue mechanical ventilation  - PO vancomycin per ID for hx of cdiff  - DVT prophylaxis  - DNR/DNI  - Dispo planning, prior discussions with plan for dc home

## 2024-04-21 NOTE — PROGRESS NOTE ADULT - ASSESSMENT
#Gastrostomy malfunction/buried bumper  s/p PEG-J placement 4/16  daily wound care   feeds/meds thru j-tube   vent g-tube as needed   ongoing surgery care appreciated     #Recurrent C.diff   per ID recs    # DM  endocrine recs appreciated

## 2024-04-21 NOTE — PROGRESS NOTE ADULT - ASSESSMENT
75 yo F PMH T2DM on insulin, HTN, HLD, hypothyroidism, RA on Prednisone, Fibromyalgia, cerebral aneurysm s/p repair, chronic hypercapnic respiratory failure s/p trach (vent dependent) and Chronic PEG 2022, recurrent C. diff infection (follows with Dr. Newman) , bedbound who presented from home with G tube dislodgement and redness around the site. Had CT Abdomen/Pelvis done showing dislodgement of G tube with balloon in the anterior abdominal wall with air filled track to stomach. Admitted to MICU for ventilator management and given vancomycin/meropenem empirically for treatment of abd wall cellulitis. Per family patient has had Known c diff infection for past 2-3 weeks.  and was being Treated with Fidaxomicin.  IR team exchanged PEG on 4/3 however later in the day it remained with leakage.  IR Attending adjusted PEG at bedside on 4/4 and PEG remained with moderate amount of PEG leakage once feeds initiated at reduced rate.  GI team re-consulted as second opinion for PEG management.  On 4/9 PEG was found out of place, a emerson catheter was placed in the stoma to maintain patency.  Patient was planned for PEG revision with both Surgery and GI team involved on 4/11 however patient had fevers up to 102F and procedure was cancelled for infectious work-up.  An 18 Fr PEG tube placed at bedside on 4/12 (original PEG size was 20Fr) as stoma site appears to have closed. Antibiotics were switched from Meropenem to Cefepime, completed 4/14. Patient S/p PEG-J and closure of gastric fistula with GI and surgery in endo suite on 4/16.      4/17: Patient S/p G-J Tube placement and closure of gastric fistula yesterday by GI and Surgery. Patient evaluated at bedside with Surgery  this morning and case d/w  will initiate feeds today @ 20 cc/hr. Heme recommendations appreciated fibrinogen sent (Results pending), B12 and Folate ordered for the morning.   4/18:  Tolerating feeds via PEG-J thus far, slowly advancing to goal rate throughout the day.  Daughter concerned patient may be having rheumatic flare given arthralgias and hand swelling.  As high dose steroids would impair surgical wound healing will defer to one time use of Toradol 15mg for acute flare.   PS weaning as tolerated.  4/19: Patient with increased discharge from original PEG stoma over night.  Site observed this morning, with mild clear viscous discharge, otherwise site clean.  Hand joint pain and swelling appears  improved compared to yesterday. Will give another dose of toradol to control flare.  4/20: No acute events over night.  Tolerated feeds via PEJ, remains with mild-moderate clear viscous output from old PEG stoma site.  PEG feeds advanced to 50ml/hr, goal 55ml/hr.   73 yo F PMH T2DM on insulin, HTN, HLD, hypothyroidism, RA on Prednisone, Fibromyalgia, cerebral aneurysm s/p repair, chronic hypercapnic respiratory failure s/p trach (vent dependent) and Chronic PEG 2022, recurrent C. diff infection (follows with Dr. Newman) , bedbound who presented from home with G tube dislodgement and redness around the site. Had CT Abdomen/Pelvis done showing dislodgement of G tube with balloon in the anterior abdominal wall with air filled track to stomach. Admitted to MICU for ventilator management and given vancomycin/meropenem empirically for treatment of abd wall cellulitis. Per family patient has had Known c diff infection for past 2-3 weeks.  and was being Treated with Fidaxomicin.  IR team exchanged PEG on 4/3 however later in the day it remained with leakage.  IR Attending adjusted PEG at bedside on 4/4 and PEG remained with moderate amount of PEG leakage once feeds initiated at reduced rate.  GI team re-consulted as second opinion for PEG management.  On 4/9 PEG was found out of place, a emerson catheter was placed in the stoma to maintain patency.  Patient was planned for PEG revision with both Surgery and GI team involved on 4/11 however patient had fevers up to 102F and procedure was cancelled for infectious work-up.  An 18 Fr PEG tube placed at bedside on 4/12 (original PEG size was 20Fr) as stoma site appears to have closed. Antibiotics were switched from Meropenem to Cefepime, completed 4/14. Patient S/p PEG-J and closure of gastric fistula with GI and surgery in endo suite on 4/16.      4/21:  Stable with no acute events.   75 yo F PMH T2DM on insulin, HTN, HLD, hypothyroidism, RA on Prednisone, Fibromyalgia, cerebral aneurysm s/p repair, chronic hypercapnic respiratory failure s/p trach (vent dependent) and Chronic PEG 2022, recurrent C. diff infection (follows with Dr. Newmna) , bedbound who presented from home with G tube dislodgement and redness around the site. Had CT Abdomen/Pelvis done showing dislodgement of G tube with balloon in the anterior abdominal wall with air filled track to stomach. Admitted to MICU for ventilator management and given vancomycin/meropenem empirically for treatment of abd wall cellulitis. Per family patient has had Known c diff infection for past 2-3 weeks.  and was being Treated with Fidaxomicin.  IR team exchanged PEG on 4/3 however later in the day it remained with leakage.  IR Attending adjusted PEG at bedside on 4/4 and PEG remained with moderate amount of PEG leakage once feeds initiated at reduced rate.  GI team re-consulted as second opinion for PEG management.  On 4/9 PEG was found out of place, a emerson catheter was placed in the stoma to maintain patency.  Patient was planned for PEG revision with both Surgery and GI team involved on 4/11 however patient had fevers up to 102F and procedure was cancelled for infectious work-up.  An 18 Fr PEG tube placed at bedside on 4/12 (original PEG size was 20Fr) as stoma site appears to have closed. Antibiotics were switched from Meropenem to Cefepime, completed 4/14. Patient S/p PEG-J and closure of gastric fistula with GI and surgery in endo suite on 4/16.      4/22: Stable no acute events, FEES Scheduled for tomorrow. CM Continues to work with Home Care agencies to arrange for dc on 4/25. Family to bring in Home Vent tomorrow in anticipation for Home Vent trial on 4/24.

## 2024-04-21 NOTE — PROGRESS NOTE ADULT - PROBLEM SELECTOR PLAN 5
- Patient with Hx of RA on Enbrel as outpatient ( Currently being held)   - Outpatient Rheumatologist Dr. Egan   - Pt w/ sclera icterus c/w Active RA  - Seen by Rheum inpatient; recc outpatient follow up   - Cont prednisone 5mg daily and Hydromorphone prn pain - Patient with Hx of RA on Enbrel as outpatient ( Currently being held)   - Outpatient Rheumatologist Dr. Egan   - Pt w/ sclera icterus c/w Active RA  - Seen by Rheum inpatient; recc outpatient follow up   - Cont prednisone 5mg daily and oxy prn pain

## 2024-04-21 NOTE — PROGRESS NOTE ADULT - PROBLEM SELECTOR PLAN 6
- Continue Lantus 5 units QHS   - Continue ISS   - Monitor BGMs - Continue Ademalog TID and Lantus QHS   - Continue ISS   - Monitor BGMs

## 2024-04-21 NOTE — PROGRESS NOTE ADULT - SUBJECTIVE AND OBJECTIVE BOX
Chief Complaint:  Patient is a 74y old  Female who presents with a chief complaint of Dislodged G Tube (21 Apr 2024 09:39)      Date of service 04-21-24 @ 11:52      Interval Events:   tolerating J-[facility] Medications:  albuterol/ipratropium for Nebulization 3 milliLiter(s) Nebulizer every 6 hours  amLODIPine   Tablet 10 milliGRAM(s) Oral <User Schedule>  artificial tears (preservative free) Ophthalmic Solution 1 Drop(s) Both EYES four times a day  ascorbic acid 500 milliGRAM(s) Oral daily  Biotene Dry Mouth Oral Rinse 5 milliLiter(s) Swish and Spit every 6 hours  calamine/zinc oxide Lotion 1 Application(s) Topical daily PRN  chlorhexidine 0.12% Liquid 15 milliLiter(s) Oral Mucosa every 12 hours  chlorhexidine 4% Liquid 1 Application(s) Topical <User Schedule>  cholecalciferol 2000 Unit(s) Oral daily  diclofenac sodium 1% Gel 4 Gram(s) Topical four times a day  erythromycin   Ointment 1 Application(s) Both EYES three times a day  escitalopram 10 milliGRAM(s) Oral <User Schedule>  fentaNYL   Patch  25 MICROgram(s)/Hr 1 Patch Transdermal every 72 hours  gabapentin Solution 100 milliGRAM(s) Oral <User Schedule>  insulin glargine Injectable (LANTUS) 12 Unit(s) SubCutaneous <User Schedule>  insulin lispro (ADMELOG) corrective regimen sliding scale   SubCutaneous every 6 hours  levothyroxine Injectable 87.5 MICROGram(s) IV Push <User Schedule>  lidocaine   4% Patch 1 Patch Transdermal at bedtime  lidocaine   4% Patch 1 Patch Transdermal at bedtime  melatonin Liquid 3 milliGRAM(s) Oral at bedtime  multivitamin/minerals/iron Oral Solution (CENTRUM) 15 milliLiter(s) Oral daily  ondansetron Injectable 4 milliGRAM(s) IV Push every 8 hours PRN  oxyCODONE    Solution 5 milliGRAM(s) Enteral Tube every 4 hours PRN  oxyCODONE    Solution 10 milliGRAM(s) Enteral Tube every 6 hours PRN  pantoprazole  Injectable 40 milliGRAM(s) IV Push daily  prednisoLONE acetate 1% Suspension 1 Drop(s) Both EYES four times a day  predniSONE  Solution 5 milliGRAM(s) Oral <User Schedule>  QUEtiapine 12.5 milliGRAM(s) Oral <User Schedule>  simethicone 80 milliGRAM(s) Chew every 6 hours  sodium chloride 0.65% Nasal 1 Spray(s) Both Nostrils every 12 hours PRN  surgical lubricant sterile 1 Application(s) Topical every 8 hours  vancomycin    Solution 125 milliGRAM(s) Enteral Tube every 12 hours  zinc sulfate 220 milliGRAM(s) Oral two times a day        Review of Systems:  General:  No wt loss, fevers, chills, night sweats, fatigue,   Eyes:  Good vision, no reported pain  ENT:  No sore throat, pain, runny nose, dysphagia  CV:  No pain, palpitations, hypo/hypertension  Resp:  No dyspnea, cough, tachypnea, wheezing  GI:  See HPI  :  No pain, bleeding, incontinence, nocturia  Muscle:  No pain, weakness  Neuro:  No weakness, tingling, memory problems  Psych:  No fatigue, insomnia, mood problems, depression  Endocrine:  No polyuria, polydipsia, cold/heat intolerance  Heme:  No petechiae, ecchymosis, easy bruisability  Integumentary:  No rash, edema    PHYSICAL EXAM:   Vital Signs:  Vital Signs Last 24 Hrs  T(C): 36.4 (21 Apr 2024 10:00), Max: 36.8 (20 Apr 2024 18:07)  T(F): 97.6 (21 Apr 2024 10:00), Max: 98.2 (20 Apr 2024 18:07)  HR: 79 (21 Apr 2024 11:31) (9 - 99)  BP: 123/59 (21 Apr 2024 10:00) (117/71 - 146/65)  BP(mean): --  RR: 28 (21 Apr 2024 10:00) (15 - 30)  SpO2: 98% (21 Apr 2024 11:31) (97% - 100%)    Parameters below as of 21 Apr 2024 10:00  Patient On (Oxygen Delivery Method): ventilator, cpap mmode      Daily     Daily       PHYSICAL EXAM:     GENERAL:  Appears stated age, well-groomed, well-nourished, no distress  HEENT:  NC/AT,  conjunctivae anicteric, clear and pink,   NECK: supple, trachea midline  CHEST:  Full & symmetric excursion, no increased effort, breath sounds clear  HEART:  Regular rhythm, no JVD  ABDOMEN:  Soft, non-tender, non-distended, normoactive bowel sounds,  no masses , no hepatosplenomegaly  EXTREMITIES:  no cyanosis,clubbing or edema  SKIN:  No rash, erythema, or, ecchymoses, no jaundice  NEURO:  Alert, non-focal, no asterixis  PSYCH: Appropriate affect, oriented to place and time  RECTAL: Deferred      LABS Personally reviewed by me:                                          8.9    5.90  )-----------( 124      ( 19 Apr 2024 06:50 )             31.5       Imaging personally reviewed by me:

## 2024-04-21 NOTE — PROGRESS NOTE ADULT - PROBLEM SELECTOR PLAN 7
- Possibly reactive in setting of antibiotics/active infection  - History of AOCD  - Heme w/u in progress; Iron studies c/w AOCD   - B12 / Folate ( Ordered for AM )   - Fibrinogen ( Sent / Results Pending); Transfuse cryoprecipitate if fibrinogen < 100  - Patient with slightly elevated INR; Vitk 10mg QD X 3 Days ( Ends 4/20)   - Transfuse for hg < 7.0 and platelets < 10k, < 20k if febrile and < 50k if bleeding ( Heme Reccs) - Possibly reactive in setting of antibiotics/active infection  - History of AOCD  - Patient with slightly elevated INR; S/p Vitk 10mg QD X 3 Days ( Ended 4/19)   - Transfuse for hg < 7.0 and platelets < 10k, < 20k if febrile and < 50k if bleeding ( Heme Reccs)

## 2024-04-21 NOTE — PROGRESS NOTE ADULT - PROBLEM SELECTOR PLAN 8
- Sacral wound present on admission   - Wound Care team continues to follow  - Frequent turning and repositioning - Sacral wound present on admission   - Wound Care team continues to follow  - Frequent turning and repositioning  - Continue Local wound care

## 2024-04-21 NOTE — CONSULT NOTE ADULT - ASSESSMENT
The patient is a 74y Female with PMH of T2DM on insulin, HTN, HLD, hypothyroidism, RA on Prednisone, Fibromyalgia, cerebral aneurysm s/p repair, acute hypercapnic respiratory failure s/p trach (vent dependent) and PEG (10/22), bedbound presented from home with complaints of possible G tube dislodgement and redness around the site, now s/p replacement. Endocrinology consulted for uncontrolled T2DM.    #Uncontrolled Type 2 Diabetes Mellitus   #Hyperglycemia on Tube feeds  - Follows with: Dr Rupali Ruth, endocrinology.   - A1C with Estimated Average Glucose Result: 6.2 % (04-04-24)  - Home regimen: Patient is on TF from 6AM-6PM. Takes Lantus 15 units qhs and regular insulin 10 units at 6AM, 12 units at 12PM, and 6 units at 6PM. She is on prednisone 5mg daily for RA  - eGFR: 113 mL/min/1.73m2 (04-19-24)  - Weight (kg): 48.1 (04-16-24)  - glucose 100s today, no evidence of DKA on labs  - current tube feeds: [employer] 1.5 at 55 cc/hr 6AM-midnight      INPATIENT PLAN:  - continue Lantus 12 units at 6PM  - continue mod dose admelog correction scale q6h  - will monitor blood glucose today and consider adding standing regular insulin at 6AM, 12PM, 6PM  - Please check FSG q6h   - Inpatient glucose goals: 100-180      DISCHARGE PLANNING:  - Discharge recs pending clinical course and tube feeds on discharge: likely c/w lantus plus regular insulin at 6AM, 12PM, 6PM  - followup with Dr Ruth: Endocrinology [facility]: [address] [place] [address]. [address]. [place] [address]. Tel: [phone]    #Secondary adrenal insufficiency  - 2/2 long term use of prednisone  - c/w prednisone 5mg daily    #Hypothyroidism  - home regimen LT4 125 mcg daily  - currently on IV LT4 87.5 mcg daily  - would resume home dose of LT5 125 mcg PO daily. Please administer in the AM on an empty stomach, 1 hour before food or other medications, and 4 hours before any PPI, calcium, or iron supplements. Can give at 5AM since tube feeds start at 6AM.    - check TSH and free T4    #Hypertension  - Goal BP <130/80  - on amlodipine  - Management as per primary team  - check urine microalbumin level as outpatient    #Hyperlipidemia  - LDL goal <70  - Last LDL: 63 mg/dL (04-04-24)  - consider mod dose statin if no contraindication  - check lipid panel as outpatient on a yearly basis    Kapil Roberts MD  Endocrine Fellow  For nonurgent matters (including consults or followup questions), please email [email or web address] or [email or web address]. For urgent matters, please call answering service at [phone] (weekdays), [phone] (nights/weekends).    The patient is a 74y Female with PMH of T2DM on insulin, HTN, HLD, hypothyroidism, RA on Prednisone, Fibromyalgia, cerebral aneurysm s/p repair, acute hypercapnic respiratory failure s/p trach (vent dependent) and PEG (10/22), bedbound presented from home with complaints of possible G tube dislodgement and redness around the site, now s/p replacement. Endocrinology consulted for uncontrolled T2DM.    #Uncontrolled Type 2 Diabetes Mellitus   #Hyperglycemia on Tube feeds  - Follows with: Dr Rupali Ruth, endocrinology.   - A1C with Estimated Average Glucose Result: 6.2 % (04-04-24)  - Home regimen: Patient is on TF from 6AM-6PM. Takes Lantus 15 units qhs and regular insulin 10 units at 6AM, 12 units at 12PM, and 6 units at 6PM. She is on prednisone 5mg daily for RA  - eGFR: 113 mL/min/1.73m2 (04-19-24)  - Weight (kg): 48.1 (04-16-24)  - glucose 100s today, no evidence of DKA on labs  - current tube feeds: [employer] 1.5 at 55 cc/hr 6AM-midnight      INPATIENT PLAN:  - continue Lantus 12 units at 6PM  - continue mod dose admelog correction scale q6h  - will monitor blood glucose today and consider adding standing regular insulin at 6AM, 12PM, 6PM  - Please check FSG q6h   - Inpatient glucose goals: 100-180      DISCHARGE PLANNING:  - Discharge recs pending clinical course and tube feeds on discharge: likely c/w lantus plus regular insulin at 6AM, 12PM, 6PM  - followup with Dr Ruth: Endocrinology [facility]: [address] [place] [address]. [address]. [place] [address]. Tel: [phone]    #Secondary adrenal insufficiency  - 2/2 long term use of prednisone  - c/w prednisone 5mg daily    #Hypothyroidism  - home regimen LT4 125 mcg daily  - currently on LT4 87.5 mcg IV daily  - would resume home dose of LT5 125 mcg PO daily. Please administer in the AM on an empty stomach, 1 hour before food or other medications, and 4 hours before any PPI, calcium, or iron supplements. Can give at 5AM since tube feeds start at 6AM.    - check TSH and free T4    #Hypertension  - Goal BP <130/80  - on amlodipine  - Management as per primary team  - check urine microalbumin level as outpatient    #Hyperlipidemia  - LDL goal <70  - Last LDL: 63 mg/dL (04-04-24)  - consider mod dose statin if no contraindication  - check lipid panel as outpatient on a yearly basis    Kapil Roberts MD  Endocrine Fellow  For nonurgent matters (including consults or followup questions), please email [email or web address] or [email or web address]. For urgent matters, please call answering service at [phone] (weekdays), [phone] (nights/weekends).    The patient is a 74y Female with PMH of T2DM on insulin, HTN, HLD, hypothyroidism, RA on Prednisone, Fibromyalgia, cerebral aneurysm s/p repair, acute hypercapnic respiratory failure s/p trach (vent dependent) and PEG (10/22), bedbound presented from home with complaints of possible G tube dislodgement and redness around the site, now s/p replacement. Endocrinology consulted for uncontrolled T2DM.    #Uncontrolled Type 2 Diabetes Mellitus   #Hyperglycemia on Tube feeds  - Follows with: Dr Rupali Ruth, endocrinology.   - A1C with Estimated Average Glucose Result: 6.2 % (04-04-24)  - Home regimen: Patient is on TF from 6AM-6PM. Takes Lantus 15 units qhs and regular insulin 10 units at 6AM, 12 units at 12PM, and 6 units at 6PM. She is on prednisone 5mg daily for RA  - eGFR: 113 mL/min/1.73m2 (04-19-24)  - Weight (kg): 48.1 (04-16-24)  - glucose 100s today, no evidence of DKA on labs  - current tube feeds: [employer] 1.5 at 55 cc/hr 6AM-midnight. unclear what final home TF will be on discharge per primary team      INPATIENT PLAN:  - continue Lantus 12 units at 6PM  - continue mod dose admelog correction scale q6h  - will monitor blood glucose today and consider adding standing regular insulin at 6AM, 12PM, 6PM  - Please check FSG q6h   - Inpatient glucose goals: 100-180      DISCHARGE PLANNING:  - Discharge recs pending clinical course and tube feeds on discharge: likely c/w lantus plus regular insulin at 6AM, 12PM, 6PM  - followup with Dr Ruth: [facility]: [address] [place] [address]. [address]. [place] [address]. Tel: [phone]    #Secondary adrenal insufficiency  - 2/2 long term use of prednisone  - c/w prednisone 5mg daily    #Hypothyroidism  - home regimen LT4 125 mcg daily  - currently on LT4 87.5 mcg IV daily  - would resume home dose of LT5 125 mcg PO daily. Please administer in the AM on an empty stomach, 1 hour before food or other medications, and 4 hours before any PPI, calcium, or iron supplements. Can give at 5AM since tube feeds start at 6AM.    - check TSH and free T4    #Hypertension  - Goal BP <130/80  - on amlodipine  - Management as per primary team  - check urine microalbumin level as outpatient    #Hyperlipidemia  - LDL goal <70  - Last LDL: 63 mg/dL (04-04-24)  - consider mod dose statin if no contraindication  - check lipid panel as outpatient on a yearly basis    Kapil Roberts MD  Endocrine Fellow  For nonurgent matters (including consults or followup questions), please email [email or web address] or [email or web address]. For urgent matters, please call answering service at [phone] (weekdays), [phone] (nights/weekends).    The patient is a 74y Female with PMH of T2DM on insulin, HTN, HLD, hypothyroidism, RA on Prednisone, Fibromyalgia, cerebral aneurysm s/p repair, acute hypercapnic respiratory failure s/p trach (vent dependent) and PEG (10/22), bedbound presented from home with complaints of possible G tube dislodgement and redness around the site, now s/p replacement. Endocrinology consulted for uncontrolled T2DM.    #Uncontrolled Type 2 Diabetes Mellitus   #Hyperglycemia on Tube feeds  - Follows with: Dr Rupali Ruth, endocrinology.   - A1C with Estimated Average Glucose Result: 6.2 % (04-04-24)  - Home regimen: Patient is on TF from 6AM-6PM. Takes Lantus 15 units qhs and regular insulin 10 units at 6AM, 12 units at 12PM, and 6 units at 6PM. She is on prednisone 5mg daily for RA  - eGFR: 113 mL/min/1.73m2 (04-19-24)  - Weight (kg): 48.1 (04-16-24)  - current tube feeds: [employer] 1.5 at 55 cc/hr 6AM-midnight. unclear what final home TF will be on discharge per primary team  - glucose trending up to 200s today      INPATIENT PLAN:  - continue Lantus 12 units at 6PM  - start regular insulin 5 units sc at 6am, 5 units sc at 12 noon and 5 units sc at 6pm. (HOLD if tube feeds are stopped)  - c/w mod dose admelog correction scale q6h  - Please check FSG q6h   - Inpatient glucose goals: 100-180      DISCHARGE PLANNING:  - Discharge recs pending clinical course and tube feeds on discharge: likely c/w lantus plus regular insulin at 6AM, 12PM, 6PM  - followup with Dr Ruth: [facility]in[facility]: [address] [place] [address]. [address]. [place] [address]. Tel: [phone]    #Secondary adrenal insufficiency  - 2/2 long term use of prednisone  - c/w prednisone 5mg daily    #Hypothyroidism  - home regimen LT4 125 mcg daily  - currently on LT4 87.5 mcg IV daily  - would resume home dose of LT5 125 mcg PO daily. Please administer in the AM on an empty stomach, 1 hour before food or other medications, and 4 hours before any PPI, calcium, or iron supplements. Can give at 5AM since tube feeds start at 6AM.    - check TSH and free T4    #Hypertension  - Goal BP <130/80  - on amlodipine  - Management as per primary team  - check urine microalbumin level as outpatient    #Hyperlipidemia  - LDL goal <70  - Last LDL: 63 mg/dL (04-04-24)  - consider mod dose statin if no contraindication  - check lipid panel as outpatient on a yearly basis    Kapil Roberts MD  Endocrine Fellow  For nonurgent matters (including consults or followup questions), please email [email or web address] or [email or web address]. For urgent matters, please call answering service at [phone] (weekdays), [phone] (nights/weekends).    The patient is a 74y Female with PMH of T2DM on insulin, HTN, HLD, hypothyroidism, RA on Prednisone, Fibromyalgia, cerebral aneurysm s/p repair, acute hypercapnic respiratory failure s/p trach (vent dependent) and PEG (10/22), bedbound presented from home with complaints of possible G tube dislodgement and redness around the site, now s/p replacement. Endocrinology consulted for uncontrolled T2DM.    #Uncontrolled Type 2 Diabetes Mellitus   #Hyperglycemia on Tube feeds  - Follows with: Dr Rupali Ruth, endocrinology.   - A1C with Estimated Average Glucose Result: 6.2 % (04-04-24)  - Home regimen: Patient is on TF from 6AM-6PM. Takes Lantus 15 units qhs and regular insulin 10 units at 6AM, 12 units at 12PM, and 6 units at 6PM. She is on prednisone 5mg daily for RA  - eGFR: 113 mL/min/1.73m2 (04-19-24)  - Weight (kg): 48.1 (04-16-24)  - current tube feeds: [employer] 1.5 at 55 cc/hr 6AM-midnight. unclear what final home TF will be on discharge per primary team  - glucose trending up to 200s today      INPATIENT PLAN:  - continue Lantus 12 units at 6PM  - start Admelog 5 units sc at 6am, 5 units sc at 12 noon and 5 units sc at 6pm. (HOLD if tube feeds are stopped)  - c/w mod dose admelog correction scale q6h  - Please check FSG q6h   - Inpatient glucose goals: 100-180      DISCHARGE PLANNING:  - Discharge recs pending clinical course and tube feeds on discharge: likely c/w lantus plus regular insulin at 6AM, 12PM, 6PM  - followup with Dr Ruth: Endocrinology [facility]: [address] [place] [address]. [address]. [place] [address]. Tel: [phone]    #Secondary adrenal insufficiency  - 2/2 long term use of prednisone  - c/w prednisone 5mg daily    #Hypothyroidism  - home regimen LT4 125 mcg daily  - currently on LT4 87.5 mcg IV daily  - would resume home dose of LT5 125 mcg PO daily. Please administer in the AM on an empty stomach, 1 hour before food or other medications, and 4 hours before any PPI, calcium, or iron supplements. Can give at 5AM since tube feeds start at 6AM.    - check TSH and free T4    #Hypertension  - Goal BP <130/80  - on amlodipine  - Management as per primary team  - check urine microalbumin level as outpatient    #Hyperlipidemia  - LDL goal <70  - Last LDL: 63 mg/dL (04-04-24)  - consider mod dose statin if no contraindication  - check lipid panel as outpatient on a yearly basis    Kapil Roberts MD  Endocrine Fellow  For nonurgent matters (including consults or followup questions), please email [email or web address] or [email or web address]. For urgent matters, please call answering service at [phone] (weekdays), [phone] (nights/weekends).

## 2024-04-22 LAB
ALBUMIN SERPL ELPH-MCNC: 2.5 G/DL — LOW (ref 3.3–5)
ALP SERPL-CCNC: 87 U/L — SIGNIFICANT CHANGE UP (ref 40–120)
ALT FLD-CCNC: 42 U/L — SIGNIFICANT CHANGE UP (ref 10–45)
ANION GAP SERPL CALC-SCNC: 10 MMOL/L — SIGNIFICANT CHANGE UP (ref 5–17)
AST SERPL-CCNC: 66 U/L — HIGH (ref 10–40)
BILIRUB SERPL-MCNC: 1 MG/DL — SIGNIFICANT CHANGE UP (ref 0.2–1.2)
BUN SERPL-MCNC: 13 MG/DL — SIGNIFICANT CHANGE UP (ref 7–23)
CALCIUM SERPL-MCNC: 8.9 MG/DL — SIGNIFICANT CHANGE UP (ref 8.4–10.5)
CHLORIDE SERPL-SCNC: 100 MMOL/L — SIGNIFICANT CHANGE UP (ref 96–108)
CO2 SERPL-SCNC: 27 MMOL/L — SIGNIFICANT CHANGE UP (ref 22–31)
CREAT SERPL-MCNC: <0.3 MG/DL — LOW (ref 0.5–1.3)
EGFR: 113 ML/MIN/1.73M2 — SIGNIFICANT CHANGE UP
GLUCOSE BLDC GLUCOMTR-MCNC: 136 MG/DL — HIGH (ref 70–99)
GLUCOSE BLDC GLUCOMTR-MCNC: 220 MG/DL — HIGH (ref 70–99)
GLUCOSE BLDC GLUCOMTR-MCNC: 257 MG/DL — HIGH (ref 70–99)
GLUCOSE BLDC GLUCOMTR-MCNC: 260 MG/DL — HIGH (ref 70–99)
GLUCOSE BLDC GLUCOMTR-MCNC: 347 MG/DL — HIGH (ref 70–99)
GLUCOSE SERPL-MCNC: 140 MG/DL — HIGH (ref 70–99)
HCT VFR BLD CALC: 29 % — LOW (ref 34.5–45)
HGB BLD-MCNC: 8.6 G/DL — LOW (ref 11.5–15.5)
MAGNESIUM SERPL-MCNC: 1.8 MG/DL — SIGNIFICANT CHANGE UP (ref 1.6–2.6)
MCHC RBC-ENTMCNC: 26.9 PG — LOW (ref 27–34)
MCHC RBC-ENTMCNC: 29.7 GM/DL — LOW (ref 32–36)
MCV RBC AUTO: 90.6 FL — SIGNIFICANT CHANGE UP (ref 80–100)
NRBC # BLD: 0 /100 WBCS — SIGNIFICANT CHANGE UP (ref 0–0)
PHOSPHATE SERPL-MCNC: 3.7 MG/DL — SIGNIFICANT CHANGE UP (ref 2.5–4.5)
PLATELET # BLD AUTO: 111 K/UL — LOW (ref 150–400)
POTASSIUM SERPL-MCNC: 3.7 MMOL/L — SIGNIFICANT CHANGE UP (ref 3.5–5.3)
POTASSIUM SERPL-SCNC: 3.7 MMOL/L — SIGNIFICANT CHANGE UP (ref 3.5–5.3)
PROT SERPL-MCNC: 6 G/DL — SIGNIFICANT CHANGE UP (ref 6–8.3)
RBC # BLD: 3.2 M/UL — LOW (ref 3.8–5.2)
RBC # FLD: 21.9 % — HIGH (ref 10.3–14.5)
SODIUM SERPL-SCNC: 137 MMOL/L — SIGNIFICANT CHANGE UP (ref 135–145)
WBC # BLD: 6.8 K/UL — SIGNIFICANT CHANGE UP (ref 3.8–10.5)
WBC # FLD AUTO: 6.8 K/UL — SIGNIFICANT CHANGE UP (ref 3.8–10.5)

## 2024-04-22 PROCEDURE — 99232 SBSQ HOSP IP/OBS MODERATE 35: CPT

## 2024-04-22 PROCEDURE — 99233 SBSQ HOSP IP/OBS HIGH 50: CPT | Mod: FS

## 2024-04-22 PROCEDURE — 99024 POSTOP FOLLOW-UP VISIT: CPT

## 2024-04-22 RX ORDER — LANOLIN/MINERAL OIL
1 LOTION (ML) TOPICAL
Qty: 30 | Refills: 0
Start: 2024-04-22 | End: 2024-07-20

## 2024-04-22 RX ORDER — INSULIN HUMAN 100 [IU]/ML
7 INJECTION, SOLUTION SUBCUTANEOUS EVERY 6 HOURS
Refills: 0 | Status: DISCONTINUED | OUTPATIENT
Start: 2024-04-22 | End: 2024-04-23

## 2024-04-22 RX ORDER — LANOLIN/MINERAL OIL
1 LOTION (ML) TOPICAL
Refills: 0
Start: 2024-04-22

## 2024-04-22 RX ORDER — INSULIN HUMAN 100 [IU]/ML
INJECTION, SOLUTION SUBCUTANEOUS EVERY 6 HOURS
Refills: 0 | Status: DISCONTINUED | OUTPATIENT
Start: 2024-04-22 | End: 2024-04-29

## 2024-04-22 RX ORDER — LEVOTHYROXINE SODIUM 125 MCG
125 TABLET ORAL DAILY
Refills: 0 | Status: DISCONTINUED | OUTPATIENT
Start: 2024-04-22 | End: 2024-05-01

## 2024-04-22 RX ORDER — LANOLIN/MINERAL OIL
1 LOTION (ML) TOPICAL
Qty: 30 | Refills: 0
Start: 2024-04-22

## 2024-04-22 RX ORDER — LANOLIN/MINERAL OIL
1 LOTION (ML) TOPICAL
Qty: 30 | Refills: 0
Start: 2024-04-22 | End: 2024-05-21

## 2024-04-22 RX ORDER — INSULIN LISPRO 100/ML
7 VIAL (ML) SUBCUTANEOUS
Refills: 0 | Status: DISCONTINUED | OUTPATIENT
Start: 2024-04-22 | End: 2024-04-22

## 2024-04-22 RX ADMIN — Medication 4: at 00:33

## 2024-04-22 RX ADMIN — Medication 1 DROP(S): at 05:18

## 2024-04-22 RX ADMIN — LIDOCAINE 1 PATCH: 4 CREAM TOPICAL at 07:40

## 2024-04-22 RX ADMIN — LIDOCAINE 1 PATCH: 4 CREAM TOPICAL at 21:57

## 2024-04-22 RX ADMIN — Medication 1 DROP(S): at 11:12

## 2024-04-22 RX ADMIN — Medication 1 APPLICATION(S): at 21:57

## 2024-04-22 RX ADMIN — SIMETHICONE 80 MILLIGRAM(S): 80 TABLET, CHEWABLE ORAL at 00:33

## 2024-04-22 RX ADMIN — LIDOCAINE 1 PATCH: 4 CREAM TOPICAL at 12:26

## 2024-04-22 RX ADMIN — Medication 5 MILLILITER(S): at 05:18

## 2024-04-22 RX ADMIN — Medication 1 APPLICATION(S): at 13:22

## 2024-04-22 RX ADMIN — LIDOCAINE 1 PATCH: 4 CREAM TOPICAL at 21:56

## 2024-04-22 RX ADMIN — GABAPENTIN 100 MILLIGRAM(S): 400 CAPSULE ORAL at 09:49

## 2024-04-22 RX ADMIN — Medication 3 MILLIGRAM(S): at 21:57

## 2024-04-22 RX ADMIN — CHLORHEXIDINE GLUCONATE 15 MILLILITER(S): 213 SOLUTION TOPICAL at 17:07

## 2024-04-22 RX ADMIN — DICLOFENAC SODIUM 4 GRAM(S): 30 GEL TOPICAL at 11:15

## 2024-04-22 RX ADMIN — Medication 87.5 MICROGRAM(S): at 05:17

## 2024-04-22 RX ADMIN — INSULIN GLARGINE 12 UNIT(S): 100 INJECTION, SOLUTION SUBCUTANEOUS at 18:17

## 2024-04-22 RX ADMIN — SIMETHICONE 80 MILLIGRAM(S): 80 TABLET, CHEWABLE ORAL at 05:17

## 2024-04-22 RX ADMIN — Medication 1 DROP(S): at 17:07

## 2024-04-22 RX ADMIN — Medication 5 MILLILITER(S): at 00:26

## 2024-04-22 RX ADMIN — Medication 5 MILLILITER(S): at 17:07

## 2024-04-22 RX ADMIN — AMLODIPINE BESYLATE 10 MILLIGRAM(S): 2.5 TABLET ORAL at 11:12

## 2024-04-22 RX ADMIN — CHLORHEXIDINE GLUCONATE 15 MILLILITER(S): 213 SOLUTION TOPICAL at 05:18

## 2024-04-22 RX ADMIN — Medication 125 MILLIGRAM(S): at 05:17

## 2024-04-22 RX ADMIN — Medication 0: at 06:30

## 2024-04-22 RX ADMIN — Medication 5 UNIT(S): at 06:30

## 2024-04-22 RX ADMIN — INSULIN HUMAN 8: 100 INJECTION, SOLUTION SUBCUTANEOUS at 18:26

## 2024-04-22 RX ADMIN — CHLORHEXIDINE GLUCONATE 1 APPLICATION(S): 213 SOLUTION TOPICAL at 05:15

## 2024-04-22 RX ADMIN — Medication 3 MILLILITER(S): at 05:15

## 2024-04-22 RX ADMIN — ESCITALOPRAM OXALATE 10 MILLIGRAM(S): 10 TABLET, FILM COATED ORAL at 17:07

## 2024-04-22 RX ADMIN — SIMETHICONE 80 MILLIGRAM(S): 80 TABLET, CHEWABLE ORAL at 17:07

## 2024-04-22 RX ADMIN — Medication 125 MILLIGRAM(S): at 17:07

## 2024-04-22 RX ADMIN — Medication 6: at 12:21

## 2024-04-22 RX ADMIN — Medication 1 DROP(S): at 17:08

## 2024-04-22 RX ADMIN — INSULIN HUMAN 7 UNIT(S): 100 INJECTION, SOLUTION SUBCUTANEOUS at 18:26

## 2024-04-22 RX ADMIN — DICLOFENAC SODIUM 4 GRAM(S): 30 GEL TOPICAL at 00:34

## 2024-04-22 RX ADMIN — DICLOFENAC SODIUM 4 GRAM(S): 30 GEL TOPICAL at 05:19

## 2024-04-22 RX ADMIN — Medication 1 DROP(S): at 00:26

## 2024-04-22 RX ADMIN — Medication 5 UNIT(S): at 12:22

## 2024-04-22 RX ADMIN — Medication 2000 UNIT(S): at 11:09

## 2024-04-22 RX ADMIN — ZINC SULFATE TAB 220 MG (50 MG ZINC EQUIVALENT) 220 MILLIGRAM(S): 220 (50 ZN) TAB at 05:18

## 2024-04-22 RX ADMIN — Medication 15 MILLILITER(S): at 11:08

## 2024-04-22 RX ADMIN — Medication 5 MILLIGRAM(S): at 12:40

## 2024-04-22 RX ADMIN — Medication 1 APPLICATION(S): at 05:18

## 2024-04-22 RX ADMIN — GABAPENTIN 100 MILLIGRAM(S): 400 CAPSULE ORAL at 21:56

## 2024-04-22 RX ADMIN — Medication 5 MILLILITER(S): at 11:08

## 2024-04-22 RX ADMIN — DICLOFENAC SODIUM 4 GRAM(S): 30 GEL TOPICAL at 17:08

## 2024-04-22 RX ADMIN — QUETIAPINE FUMARATE 12.5 MILLIGRAM(S): 200 TABLET, FILM COATED ORAL at 11:08

## 2024-04-22 RX ADMIN — Medication 500 MILLIGRAM(S): at 11:09

## 2024-04-22 RX ADMIN — Medication 1 DROP(S): at 11:09

## 2024-04-22 RX ADMIN — SIMETHICONE 80 MILLIGRAM(S): 80 TABLET, CHEWABLE ORAL at 11:08

## 2024-04-22 RX ADMIN — ZINC SULFATE TAB 220 MG (50 MG ZINC EQUIVALENT) 220 MILLIGRAM(S): 220 (50 ZN) TAB at 17:07

## 2024-04-22 RX ADMIN — Medication 3 MILLILITER(S): at 23:13

## 2024-04-22 RX ADMIN — Medication 3 MILLILITER(S): at 11:32

## 2024-04-22 RX ADMIN — Medication 3 MILLILITER(S): at 18:06

## 2024-04-22 RX ADMIN — PANTOPRAZOLE SODIUM 40 MILLIGRAM(S): 20 TABLET, DELAYED RELEASE ORAL at 11:09

## 2024-04-22 NOTE — PROGRESS NOTE ADULT - SUBJECTIVE AND OBJECTIVE BOX
Follow Up:  c diff    Interval History/ROS:  loose stools     Allergies  pineapple (Unknown)  Tagamet (Unknown)  heparin (Unknown)  walnut (Unknown)  metronidazole (Rash)  penicillin (Unknown)  Lyrica (Unknown)  meropenem (Rash)  P[place]a reid, [place] (Unknown)    ANTIMICROBIALS:  vancomycin    Solution 125 every 12 hours      OTHER MEDS:  MEDICATIONS  (STANDING):  albuterol/ipratropium for Nebulization 3 every 6 hours  amLODIPine   Tablet 10 <User Schedule>  escitalopram 10 <User Schedule>  fentaNYL   Patch  25 MICROgram(s)/Hr 1 every 72 hours  gabapentin Solution 100 <User Schedule>  insulin glargine Injectable (LANTUS) 12 <User Schedule>  insulin regular  human corrective regimen sliding scale  every 6 hours  insulin regular  human recombinant 7 every 6 hours  levothyroxine 125 daily  melatonin Liquid 3 at bedtime  ondansetron Injectable 4 every 8 hours PRN  oxyCODONE    Solution 5 every 4 hours PRN  oxyCODONE    Solution 10 every 6 hours PRN  pantoprazole  Injectable 40 daily  predniSONE  Solution 5 <User Schedule>  QUEtiapine 12.5 <User Schedule>  simethicone 80 every 6 hours      Vital Signs Last 24 Hrs  T(C): 36.4 (22 Apr 2024 11:00), Max: 36.7 (22 Apr 2024 00:00)  T(F): 97.5 (22 Apr 2024 11:00), Max: 98.1 (22 Apr 2024 00:00)  HR: 89 (22 Apr 2024 18:09) (75 - 97)  BP: 131/68 (22 Apr 2024 11:00) (122/69 - 131/68)  BP(mean): --  RR: 18 (22 Apr 2024 11:00) (18 - 20)  SpO2: 99% (22 Apr 2024 18:09) (98% - 100%)    Parameters below as of 22 Apr 2024 18:09  Patient On (Oxygen Delivery Method): ventilator        PHYSICAL EXAM:  General: WN/WD NAD, Non-toxic  Neurology: A&Ox3, nonfocal  Respiratory: Clear to auscultation bilaterally  CV: RRR, S1S2, no murmurs, rubs or gallops  Abdominal: Soft, Non-tender, non-distended, normal bowel sounds  Extremities: No edema, + peripheral pulses  Line Sites: Clear  Skin: No rash                          8.6    6.80  )-----------( 111      ( 22 Apr 2024 06:37 )             29.0       04-22    137  |  100  |  13  ----------------------------<  140<H>  3.7   |  27  |  <0.30<L>    Ca    8.9      22 Apr 2024 06:37  Phos  3.7     04-22  Mg     1.8     04-22    TPro  6.0  /  Alb  2.5<L>  /  TBili  1.0  /  DBili  x   /  AST  66<H>  /  ALT  42  /  AlkPhos  87  04-22      Urinalysis Basic - ( 22 Apr 2024 06:37 )    Color: x / Appearance: x / SG: x / pH: x  Gluc: 140 mg/dL / Ketone: x  / Bili: x / Urobili: x   Blood: x / Protein: x / Nitrite: x   Leuk Esterase: x / RBC: x / WBC x   Sq Epi: x / Non Sq Epi: x / Bacteria: x        MICROBIOLOGY:  .Blood Blood-Peripheral  04-15-24   No growth at 5 days  --  --      .Blood Blood-Peripheral  04-15-24   No growth at 5 days  --  --      Catheterized Catheterized  04-12-24   No growth  --  --      .Blood Blood-Peripheral  04-12-24   No growth at 5 days  --  --      .Blood Blood-Peripheral  04-11-24   No growth at 5 days  --  --      .Blood Blood-Peripheral  04-09-24   No growth at 5 days  --  --      .Sputum Sputum  04-07-24   Mixed gram negative rods Culture includes  Moderate Stenotrophomonas maltophilia  Few Pseudomonas aeruginosa (Carbapenem Resistant)  --  Stenotrophomonas maltophilia  Pseudomonas aeruginosa (Carbapenem Resistant)      .Blood Blood-Peripheral  04-07-24   No growth at 5 days  --  Blood Culture PCR  Serratia marcescens      .Sputum Sputum  04-05-24   Numerous Pseudomonas aeruginosa  Normal Respiratory Shanda present  --  Pseudomonas aeruginosa      .Blood Blood-Peripheral  04-04-24   No growth at 5 days  --  --      Clean Catch Clean Catch (Midstream)  04-03-24   <10,000 CFU/mL Normal Urogenital Shanda  --  --      .Blood Blood-Peripheral  04-02-24   No growth at 5 days  --  --      RADIOLOGY:    Zion Newman MD; Division of Infectious Disease; Pager: [phone]; nights and weekends: [phone]

## 2024-04-22 NOTE — PROGRESS NOTE ADULT - PROBLEM SELECTOR PLAN 5
- Patient with Hx of RA on Enbrel as outpatient ( Currently being held)   - Outpatient Rheumatologist Dr. Egan   - Pt w/ sclera icterus c/w Active RA  - Seen by Rheum inpatient; recc outpatient follow up   - Cont prednisone 5mg daily and oxy prn pain

## 2024-04-22 NOTE — PROGRESS NOTE ADULT - TIME BILLING
Reviewed images and labs. Clinical decision making, changes in medication regimen. Ventilator management and weaning. Discussion with consultants, and family.

## 2024-04-22 NOTE — PROGRESS NOTE ADULT - ASSESSMENT
73 yo F PMH T2DM on insulin, HTN, HLD, hypothyroidism, RA on Prednisone, Fibromyalgia, cerebral aneurysm s/p repair, chronic hypercapnic respiratory failure s/p trach (vent dependent) and Chronic PEG 2022, recurrent C. diff infection (follows with Dr. Newman) , bedbound who presented from home with G tube dislodgement and redness around the site. Had CT Abdomen/Pelvis done showing dislodgement of G tube with balloon in the anterior abdominal wall with air filled track to stomach. Admitted to MICU for ventilator management and given vancomycin/meropenem empirically for treatment of abd wall cellulitis. Per family patient has had Known c diff infection for past 2-3 weeks.  and was being Treated with Fidaxomicin.  IR team exchanged PEG on 4/3 however later in the day it remained with leakage.  IR Attending adjusted PEG at bedside on 4/4 and PEG remained with moderate amount of PEG leakage once feeds initiated at reduced rate.  GI team re-consulted as second opinion for PEG management.  On 4/9 PEG was found out of place, a emerson catheter was placed in the stoma to maintain patency.  Patient was planned for PEG revision with both Surgery and GI team involved on 4/11 however patient had fevers up to 102F and procedure was cancelled for infectious work-up.  An 18 Fr PEG tube placed at bedside on 4/12 (original PEG size was 20Fr) as stoma site appears to have closed. Antibiotics were switched from Meropenem to Cefepime, completed 4/14. Patient S/p PEG-J and closure of gastric fistula with GI and surgery in endo suite on 4/16.      4/22: Stable no acute events, FEES Scheduled for tomorrow. CM Continues to work with Home Care agencies to arrange for dc on 4/25. Family to bring in Home Vent tomorrow in anticipation for Home Vent trial on 4/24.

## 2024-04-22 NOTE — PROGRESS NOTE ADULT - PROBLEM SELECTOR PLAN 1
- Patient p/w PEG tube dislodgement on admission   - CT A/P 4/2: Dislodged G tube with balloon in the anterior abdominal wall with air filled track to stomach  - S/p bedside exchange by IR on 4/3 ( # 20 Fr Kangaroo EnFit placed )  - 4/4: PEG leaking; IR adjusted PEG at bedside  - 4/5: PEG still leaking, adjusted at bedside by IR Attending.  - 4/8-4/9 IR aborted procedure; GI Dr. Cleaning consulted for new PEG  - 4/9 PEG fell out spontaneously, Mcbride placed into tract   - 4/12: Mcbride replaced with 18Fr PEG at bedside, bumper at 8cm (intentionally loose)  - CT A/P 4/12: G-tube in the stomach/Mild inflammation thickening along tract   - 4/14 18 fr PEG was used, feeds were re-initiated, attempt failed, PEG with copious amounts of leakage  - 4/16 S/p new PEG-J and closure of gastric fistula in endo suite with GI and surgery  - Patient currently tolerating TFs at goal rate

## 2024-04-22 NOTE — PROGRESS NOTE ADULT - SUBJECTIVE AND OBJECTIVE BOX
seen earlier today     Chief Complaint: Diabetes Mellitus follow up    INTERVAL HX:  patient seen in RCU with  and aide at the bedside.  concerned that patient is thirsty and wants water. Advised this is contraindicated. Currently on Tube Feeding Casie Dickson 1.5 @55cc/hr for 18hrs 5844-2418 daily. BG over last 24hrs variable with values up to 275mg/dl.     Review of Systems:  General: As above  GI: No nausea, vomiting  Endocrine: no  S&Sx of hypoglycemia    Allergies    pineapple (Unknown)  Tagamet (Unknown)  heparin (Unknown)  walnut (Unknown)  metronidazole (Rash)  penicillin (Unknown)  Lyrica (Unknown)  meropenem (Rash)  Pecan, Filbert, Hazelnut (Unknown)    Intolerances      MEDICATIONS  (STANDING):  albuterol/ipratropium for Nebulization 3 milliLiter(s) Nebulizer every 6 hours  amLODIPine   Tablet 10 milliGRAM(s) Oral <User Schedule>  artificial tears (preservative free) Ophthalmic Solution 1 Drop(s) Both EYES four times a day  ascorbic acid 500 milliGRAM(s) Oral daily  Biotene Dry Mouth Oral Rinse 5 milliLiter(s) Swish and Spit every 6 hours  chlorhexidine 0.12% Liquid 15 milliLiter(s) Oral Mucosa every 12 hours  chlorhexidine 4% Liquid 1 Application(s) Topical <User Schedule>  cholecalciferol 2000 Unit(s) Oral daily  diclofenac sodium 1% Gel 4 Gram(s) Topical four times a day  erythromycin   Ointment 1 Application(s) Both EYES three times a day  escitalopram 10 milliGRAM(s) Oral <User Schedule>  fentaNYL   Patch  25 MICROgram(s)/Hr 1 Patch Transdermal every 72 hours  gabapentin Solution 100 milliGRAM(s) Oral <User Schedule>  insulin glargine Injectable (LANTUS) 12 Unit(s) SubCutaneous <User Schedule>  insulin lispro (ADMELOG) corrective regimen sliding scale   SubCutaneous every 6 hours  insulin lispro Injectable (ADMELOG) 5 Unit(s) SubCutaneous <User Schedule>  levothyroxine 125 MICROGram(s) Oral daily  lidocaine   4% Patch 1 Patch Transdermal at bedtime  lidocaine   4% Patch 1 Patch Transdermal at bedtime  melatonin Liquid 3 milliGRAM(s) Oral at bedtime  multivitamin/minerals/iron Oral Solution (CENTRUM) 15 milliLiter(s) Oral daily  pantoprazole  Injectable 40 milliGRAM(s) IV Push daily  prednisoLONE acetate 1% Suspension 1 Drop(s) Both EYES four times a day  predniSONE  Solution 5 milliGRAM(s) Oral <User Schedule>  QUEtiapine 12.5 milliGRAM(s) Oral <User Schedule>  simethicone 80 milliGRAM(s) Chew every 6 hours  surgical lubricant sterile 1 Application(s) Topical every 8 hours  vancomycin    Solution 125 milliGRAM(s) Enteral Tube every 12 hours  zinc sulfate 220 milliGRAM(s) Oral two times a day        insulin glargine Injectable (LANTUS)   12 Unit(s) SubCutaneous (04-21-24 @ 19:40)    insulin lispro (ADMELOG) corrective regimen sliding scale   6 Unit(s) SubCutaneous (04-22-24 @ 12:21)   0 Unit(s) SubCutaneous (04-22-24 @ 06:30)   4 Unit(s) SubCutaneous (04-22-24 @ 00:33)   6 Unit(s) SubCutaneous (04-21-24 @ 18:54)    insulin lispro Injectable (ADMELOG)   5 Unit(s) SubCutaneous (04-22-24 @ 12:22)   5 Unit(s) SubCutaneous (04-22-24 @ 06:30)   5 Unit(s) SubCutaneous (04-21-24 @ 18:55)    levothyroxine Injectable   87.5 MICROGram(s) IV Push (04-22-24 @ 05:17)    predniSONE  Solution   5 milliGRAM(s) Oral (04-22-24 @ 12:40)        PHYSICAL EXAM:  VITALS: T(C): 36.4 (04-22-24 @ 11:00)  T(F): 97.5 (04-22-24 @ 11:00), Max: 98.1 (04-22-24 @ 00:00)  HR: 88 (04-22-24 @ 11:00) (75 - 97)  BP: 131/68 (04-22-24 @ 11:00) (122/69 - 145/68)  RR:  (18 - 20)  SpO2:  (98% - 100%)  Wt(kg): --  GENERAL: NAD  Respiratory: Respirations unlabored   Extremities: Warm, no edema  NEURO: Alert ,trached, non verbal     LABS:  POCT Blood Glucose.: 260 mg/dL (04-22-24 @ 11:57)  POCT Blood Glucose.: 136 mg/dL (04-22-24 @ 05:49)  POCT Blood Glucose.: 220 mg/dL (04-21-24 @ 23:59)  POCT Blood Glucose.: 253 mg/dL (04-21-24 @ 18:46)  POCT Blood Glucose.: 275 mg/dL (04-21-24 @ 17:38)  POCT Blood Glucose.: 265 mg/dL (04-21-24 @ 12:20)  POCT Blood Glucose.: 108 mg/dL (04-21-24 @ 05:31)  POCT Blood Glucose.: 184 mg/dL (04-21-24 @ 00:14)  POCT Blood Glucose.: 364 mg/dL (04-20-24 @ 17:52)  POCT Blood Glucose.: 243 mg/dL (04-20-24 @ 12:01)  POCT Blood Glucose.: 188 mg/dL (04-20-24 @ 05:39)  POCT Blood Glucose.: 246 mg/dL (04-19-24 @ 23:56)                          8.6    6.80  )-----------( 111      ( 22 Apr 2024 06:37 )             29.0     04-22    137  |  100  |  13  ----------------------------<  140<H>  3.7   |  27  |  <0.30<L>    Ca    8.9      22 Apr 2024 06:37  Phos  3.7     04-22  Mg     1.8     04-22    TPro  6.0  /  Alb  2.5<L>  /  TBili  1.0  /  DBili  x   /  AST  66<H>  /  ALT  42  /  AlkPhos  87  04-22    eGFR: 113 mL/min/1.73m2 (22 Apr 2024 06:37)    04-04 Chol 130 Direct LDL -- LDL calculated 63 HDL 20<L> Trig 295<H>  Thyroid Function Tests:      A1C with Estimated Average Glucose Result: 6.2 % (04-04-24 @ 07:40)    Estimated Average Glucose: 131 mg/dL (04-04-24 @ 07:40)        Diet, NPO with Tube Feed:   Tube Feeding Modality: Jejunostomy  Casie Farms Peptide 1.5 ([id number])  Total Volume for 24 Hours (mL): 990  Continuous  Starting Tube Feed Rate mL per Hour: 50  Increase Tube Feed Rate by (mL): 5     Every 24 hours  Until Goal Tube Feed Rate (mL per Hour): 55  Tube Feed Duration (in Hours): 18  Tube Feed Start Time: 06:00  Tube Feed Stop Time: 00:00  Free Water Flush  Pump   Rate (mL per Hour): 20   Frequency: Every 2 Hours  Alfred(7 Gm Arginine/7 Gm Glut/1.2 Gm HMB     Qty per Day:  2  No Carb Prosource TF     Qty per Day:  1 (04-21-24 @ 09:14) [Active]

## 2024-04-22 NOTE — PROGRESS NOTE ADULT - ASSESSMENT
The patient is a 74y Female with PMH of T2DM on insulin, HTN, HLD, hypothyroidism, RA on Prednisone, Fibromyalgia, cerebral aneurysm s/p repair, acute hypercapnic respiratory failure s/p trach (vent dependent) and PEG (10/22), bedbound presented from home with complaints of possible G tube dislodgement and redness around the site, now s/p replacement. Endocrinology consulted for uncontrolled T2DM.    #Uncontrolled Type 2 Diabetes Mellitus   #Hyperglycemia on Tube feeds  - Follows with: Dr Rupali Ruth, endocrinology.   - A1C with Estimated Average Glucose Result: 6.2 % (04-04-24)  - Home regimen: Patient is on TF from 6AM-6PM. Takes Lantus 15 units qhs and regular insulin 10 units at 6AM, 12 units at 12PM, and 6 units at 6PM. She is on prednisone 5mg daily for RA  - eGFR: 113 mL/min/1.73m2 (04-19-24)  - Weight (kg): 48.1 (04-16-24)  - current tube feeds: [employer] 1.5 at 55 cc/hr 6AM-midnight. unclear what final home TF will be on discharge per primary team  - glucose trending up to 200s today      INPATIENT PLAN:  - continue Lantus 12 units at 6PM  - start Admelog 5 units sc at 6am, 5 units sc at 12 noon and 5 units sc at 6pm. (HOLD if tube feeds are stopped)  - c/w mod dose admelog correction scale q6h  - Please check FSG q6h   - Inpatient glucose goals: 100-180      DISCHARGE PLANNING:  - Discharge recs pending clinical course and tube feeds on discharge: likely c/w lantus plus regular insulin at 6AM, 12PM, 6PM  - followup with Dr Ruth: [facility]: [address] [place] [address]. [address]. [place] [address]. Tel: [phone]    #Secondary adrenal insufficiency  - 2/2 long term use of prednisone  - c/w prednisone 5mg daily    #Hypothyroidism  - home regimen LT4 125 mcg daily  - currently on LT4 87.5 mcg IV daily  - would resume home dose of LT5 125 mcg PO daily. Please administer in the AM on an empty stomach, 1 hour before food or other medications, and 4 hours before any PPI, calcium, or iron supplements. Can give at 5AM since tube feeds start at 6AM.    - check TSH and free T4    #Hypertension  - Goal BP <130/80  - on amlodipine  - Management as per primary team  - check urine microalbumin level as outpatient    #Hyperlipidemia  - LDL goal <70  - Last LDL: 63 mg/dL (04-04-24)  - consider mod dose statin if no contraindication  - check lipid panel as outpatient on a yearly basis    Contact via Microsoft Teams during business hours  To reach covering provider access AMION via sunrise tools  For Urgent matters/after-hours/weekends/holidays please page endocrine fellow on call   For nonurgent matters please email [email or web address]    Please note that this patient may be followed by different provider tomorrow.  Notify endocrine 24 hours prior to discharge for final recommendations      The patient is a 74y Female with PMH of T2DM on insulin, HTN, HLD, hypothyroidism, RA on Prednisone, Fibromyalgia, cerebral aneurysm s/p repair, acute hypercapnic respiratory failure s/p trach (vent dependent) and PEG (10/22), bedbound presented from home with complaints of possible G tube dislodgement and redness around the site, now s/p replacement. Endocrinology consulted for uncontrolled T2DM.    #Uncontrolled Type 2 Diabetes Mellitus   #Hyperglycemia on Tube feeds  - Follows with: Dr Rupali Ruth, endocrinology.   - A1C with Estimated Average Glucose Result: 6.2 % (04-04-24)  - Home regimen: Patient is on TF from 6AM-6PM. Takes Lantus 15 units qhs and regular insulin 10 units at 6AM, 12 units at 12PM, and 6 units at 6PM. She is on prednisone 5mg daily for RA  - eGFR: 113 mL/min/1.73m2 (04-19-24)  - Weight (kg): 48.1 (04-16-24)  - current tube feeds: [employer]ms 1.5 at 55 cc/hr 6AM-midnight. unclear what final home TF will be on discharge per primary team  - glucose trending up to 200s today      INPATIENT PLAN:  - continue Lantus 12 units at 6PM  - increase Admelog 7 units sc at 6am, 7 units sc at 12 noon and 7units sc at 6pm. (HOLD if tube feeds are stopped)  - c/w mod dose admelog correction scale q6h  - Please check FSG q6h   - Inpatient glucose goals: 100-180      DISCHARGE PLANNING:  - Discharge recs pending clinical course and tube feeds on discharge: likely c/w lantus plus regular insulin at 6AM, 12PM, 6PM  - followup with Dr Ruth: [facility]: [address] [place] [address]. [address]. [place] [address]. Tel: [phone]    #Secondary adrenal insufficiency  - 2/2 long term use of prednisone  - c/w prednisone 5mg daily    #Hypothyroidism  - home regimen LT4 125 mcg daily  - currently on LT4 87.5 mcg IV daily  - would resume home dose of LT5 125 mcg PO daily. Please administer in the AM on an empty stomach, 1 hour before food or other medications, and 4 hours before any PPI, calcium, or iron supplements. Can give at 5AM since tube feeds start at 6AM.    - check TSH and free T4    #Hypertension  - Goal BP <130/80  - on amlodipine  - Management as per primary team  - check urine microalbumin level as outpatient    #Hyperlipidemia  - LDL goal <70  - Last LDL: 63 mg/dL (04-04-24)  - consider mod dose statin if no contraindication  - check lipid panel as outpatient on a yearly basis    Contact via Microsoft Teams during business hours  To reach covering provider access AMION via sunrise tools  For Urgent matters/after-hours/weekends/holidays please page endocrine fellow on call   For nonurgent matters please email [email or web address]    Please note that this patient may be followed by different provider tomorrow.  Notify endocrine 24 hours prior to discharge for final recommendations      The patient is a 74y Female with PMH of T2DM on insulin, HTN, HLD, hypothyroidism, RA on Prednisone, Fibromyalgia, cerebral aneurysm s/p repair, acute hypercapnic respiratory failure s/p trach (vent dependent) and PEG (10/22), bedbound presented from home with complaints of possible G tube dislodgement and redness around the site, now s/p replacement. Endocrinology consulted for uncontrolled T2DM.    #Uncontrolled Type 2 Diabetes Mellitus   #Hyperglycemia on Tube feeds  - Follows with: Dr Rupali Ruth, endocrinology.   - A1C with Estimated Average Glucose Result: 6.2 % (04-04-24)  - Home regimen: Patient is on TF from 6AM-6PM. Takes Lantus 15 units qhs and regular insulin 10 units at 6AM, 12 units at 12PM, and 6 units at 6PM. She is on prednisone 5mg daily for RA  - eGFR: 113 mL/min/1.73m2 (04-19-24)  - Weight (kg): 48.1 (04-16-24)  - current tube feeds: [employer]ms 1.5 at 55 cc/hr 6AM-midnight. unclear what final home TF will be on discharge per primary team  - glucose trending up to 200s today      INPATIENT PLAN:  - continue Lantus 12 units at 6PM  - increase Admelog 7 units sc at 6am, 7 units sc at 12 noon and 7units sc at 6pm. (HOLD if tube feeds are stopped).  - c/w mod dose admelog correction scale q6h  - Please check FSG q6h   - Inpatient glucose goals: 100-180      DISCHARGE PLANNING:  - Discharge recs pending clinical course and tube feeds on discharge: likely c/w lantus plus regular insulin at 6AM, 12PM, 6PM  - followup with Dr Ruth: [facility]: [address] [place] [address]. [address]. [place] [address]. Tel: [phone]    #Secondary adrenal insufficiency  - 2/2 long term use of prednisone  - c/w prednisone 5mg daily    #Hypothyroidism  - home regimen LT4 125 mcg daily  - currently on LT4 87.5 mcg IV daily  - would resume home dose of LT5 125 mcg PO daily. Please administer in the AM on an empty stomach, 1 hour before food or other medications, and 4 hours before any PPI, calcium, or iron supplements. Can give at 5AM since tube feeds start at 6AM.    - check TSH and free T4    #Hypertension  - Goal BP <130/80  - on amlodipine  - Management as per primary team  - check urine microalbumin level as outpatient    #Hyperlipidemia  - LDL goal <70  - Last LDL: 63 mg/dL (04-04-24)  - consider mod dose statin if no contraindication  - check lipid panel as outpatient on a yearly basis    Contact via Microsoft Teams during business hours  To reach covering provider access AMION via sunrise tools  For Urgent matters/after-hours/weekends/holidays please page endocrine fellow on call   For nonurgent matters please email [email or web address]    Please note that this patient may be followed by different provider tomorrow.  Notify endocrine 24 hours prior to discharge for final recommendations      The patient is a 74y Female with PMH of T2DM on insulin, HTN, HLD, hypothyroidism, RA on Prednisone, Fibromyalgia, cerebral aneurysm s/p repair, acute hypercapnic respiratory failure s/p trach (vent dependent) and PEG (10/22), bedbound presented from home with complaints of possible G tube dislodgement and redness around the site, now s/p replacement. Endocrinology consulted for uncontrolled T2DM.    #Uncontrolled Type 2 Diabetes Mellitus   #Hyperglycemia on Tube feeds  - Follows with: Dr Rupali Ruth, endocrinology.   - A1C with Estimated Average Glucose Result: 6.2 % (04-04-24)  - Home regimen: Patient is on TF from 6AM-6PM. Takes Lantus 15 units qhs and regular insulin 10 units at 6AM, 12 units at 12PM, and 6 units at 6PM. She is on prednisone 5mg daily for RA  - eGFR: 113 mL/min/1.73m2 (04-19-24)  - Weight (kg): 48.1 (04-16-24)  - current tube feeds: [employer] 1.5 at 55 cc/hr 6AM-midnight. unclear what final home TF will be on discharge per primary team  - glucose trending up to 200s today      INPATIENT PLAN:  - continue Lantus 12 units at 6PM  - Changed  Admelog to Regular insulin (longer acting)  7 units sc at 6am, 7 units sc at 12 noon and 7units sc at 6pm. (HOLD if tube feeds are stopped).  - Changed mod dose admelog correction scale to moderate dose Regular insulin scale q6h  - Please check FSG q6h   - Inpatient glucose goals: 100-180      DISCHARGE PLANNING:  - Discharge recs pending clinical course and tube feeds on discharge: likely c/w lantus plus regular insulin at 6AM, 12PM, 6PM  - followup with Dr Ruth: [facility]: [address] [place] [address]. [address]. [place] [address]. Tel: [phone]    #Secondary adrenal insufficiency  - 2/2 long term use of prednisone  - c/w prednisone 5mg daily    #Hypothyroidism  - home regimen LT4 125 mcg daily  - currently on LT4 87.5 mcg IV daily  - would resume home dose of LT5 125 mcg PO daily. Please administer in the AM on an empty stomach, 1 hour before food or other medications, and 4 hours before any PPI, calcium, or iron supplements. Can give at 5AM since tube feeds start at 6AM.    - check TSH and free T4    #Hypertension  - Goal BP <130/80  - on amlodipine  - Management as per primary team  - check urine microalbumin level as outpatient    #Hyperlipidemia  - LDL goal <70  - Last LDL: 63 mg/dL (04-04-24)  - consider mod dose statin if no contraindication  - check lipid panel as outpatient on a yearly basis    Contact via Microsoft Teams during business hours  To reach covering provider access AMION via sunrise tools  For Urgent matters/after-hours/weekends/holidays please page endocrine fellow on call   For nonurgent matters please email [email or web address]    Please note that this patient may be followed by different provider tomorrow.  Notify endocrine 24 hours prior to discharge for final recommendations

## 2024-04-22 NOTE — PROGRESS NOTE ADULT - ASSESSMENT
75yo woman  h/o T2DM (4/4/24: A1C = 6.2%), HTN, HLD, anemia, hypothyroidism, RA, fibromyalgia, remote cerebral aneurysm repair, acute hypercapnic respiratory failure with tracheostomy, PEG tube (initially placed 2022), and bedbound, recurrent C diff presented for PEG tube dislodgment. Admitted 4/2/24  weight = 54.5 kg    Recurrent C diff - first episode Aug 2023 treated with tapering PO vanco, recurrent episode 1/31/24 treated with PO vanco and then fidaxomicin completed in Feb - most recently tested positive 3/20/24 seen by Dr. Newman 3/29 outpatient, started on fidaxomicin that day - tube feeds concurrently held, unclear if improving afterwards per family  Chronic sacral decubitus wound  3/20 Klebisella bacteruria R only to amp and sputum few Pseudomonas R to FQs w/o polys on gram stian - treatment of urine was deferred to avoid exacerbating C diff    Tmax 100, no leukocytosis  Concern for cellulitis around PEG tube site - area with very minimal erythema, remarkable receded from skin norm placed on admission  UA 11 WBC  CT a/p: no infectious focus or colitis  MRSA PCR+    4/3 IR - PEG exchanged bedside to 20 Fr Kangaroo EnFit  - Bedside XR demonstrates appropriately positioned G-tube with contrast filling into the stomach and bowel.    4/2 Blood Cultures x 2 NGTD;  Positive MRSA/MSSA PCR  4/3 Urine cultures NEG  4/4 fever  4/4 Wound care assessment appreciated:   " area of persistent nonblanchable dark discoloration that is inconsistent with a patient's surrounding skin tone as well as a white juan j film noted over B/L buttocks/sacral skin, area measures approximately 10cm x 10cm x 0cm- presentation is consistent with a deep tissue injury in evolution with incontinence and fungal involvement present on admission. Within the apex of the gluteal cleft/base of sacrum there is full thickness skin loss with red, beefy tissue noted, area measures approximately 3cm x 1cm x 0.3cm- presentation is consistent with a deep tissue injury in evolution with incontinence involvement present on admission."  4/7 fever; Meropenem resumed  Positive Blood Culture x1 Serratia marcesens    4/16 OR: Gastrojejunostomy, percutaneous, endoscopic, Endoscopic assisted closure of PEG site. Endoscopic assisted percutaneous gastrojejunostomy tube placement.   4/19 K= 4.4  4/22 feeds @ 55 cc/hr    Antibiotics  Meropenem 4/2 -->4/3; 4/7 --> 4/11  Cefepime 4/11 --> 4/14  IV Vanco 4/2  Fdaxomicin 4/3--> 4/8  PO Vanco 4/8 -->     Suggest:  maintain fluid and electrolytes with increased diarrhea - KCl repletion   Continue po Vanco  Extended po Vancomycin taper  Bezlotoxumab (Zinplava) at end of Vanco course as out pt    Vanco dosing schedule  Vancomycin 125 mg po 4 times daily 4/8 --> 4/18  Vancomycin 125 mg po 2 times daily 4/19 --> 4/25  Vancomycin 125 mg po once daily 4/26 -->5/2  Vancomycin 125 mg po once every other day 5/3 --> 5/9  Vancomycin 125 mg po once every third day 5/10 --> 5/23/24    Given multiple recurrences in context of advanced age, debility and multiple co-morbidities, administration of Bezlotoxumab (Zinplava) 500 mg over 1 hours is indicated to reduce future recurrences  Ref: Norm Wheeler M.D., Rojas Contreras M.D., Scott Griffith, Ph.D., Shiva Abraham M.D., Gabriel Falcon D.O., Pedro Weiss M.D., Talib Hampton M.D., Sena Hughes M.D., Raimundo Marte M.D., Melissa Soria M.D., Piyush Lamas M.D., Eliceo Romero M.D., Neyda Xie M.D., Jude Peters M.D., Neelima Vela B.S.M.T., Hannah Cardenas, Ph.D., Ana Quiroz, B.S., Obie Chirinos, M.S., Khushbu Flores M.D., Jean Carlos Ayala M.D., and Kristin Lopez, Ph.D., for the MODIFY I and MODIFY II Investigators*    Bezlotoxumab is suggested in IDSA guidelines -  Ref: Clinical Practice Guidelines for the Management of Clostridioides difficile Infection in Adults: 2021 Update by SHEA/IDSA  Published VERNA, 6/14/2021; Clinical Infectious Diseases, tafa099, https://doi.org/10.1093/verna/xpkb571    Zinplava to be administered at home at end of vanco taper around 5/23/24

## 2024-04-22 NOTE — PROGRESS NOTE ADULT - SUBJECTIVE AND OBJECTIVE BOX
Chief Complaint:  Patient is a 74y old  Female who presents with a chief complaint of Dislodged G Tube (22 Apr 2024 10:45)      Date of service 04-22-24 @ 13:47      Interval Events:   no acute GI events    Hospital Medications:  albuterol/ipratropium for Nebulization 3 milliLiter(s) Nebulizer every 6 hours  amLODIPine   Tablet 10 milliGRAM(s) Oral <User Schedule>  artificial tears (preservative free) Ophthalmic Solution 1 Drop(s) Both EYES four times a day  ascorbic acid 500 milliGRAM(s) Oral daily  Biotene Dry Mouth Oral Rinse 5 milliLiter(s) Swish and Spit every 6 hours  calamine/zinc oxide Lotion 1 Application(s) Topical daily PRN  chlorhexidine 0.12% Liquid 15 milliLiter(s) Oral Mucosa every 12 hours  chlorhexidine 4% Liquid 1 Application(s) Topical <User Schedule>  cholecalciferol 2000 Unit(s) Oral daily  diclofenac sodium 1% Gel 4 Gram(s) Topical four times a day  erythromycin   Ointment 1 Application(s) Both EYES three times a day  escitalopram 10 milliGRAM(s) Oral <User Schedule>  fentaNYL   Patch  25 MICROgram(s)/Hr 1 Patch Transdermal every 72 hours  gabapentin Solution 100 milliGRAM(s) Oral <User Schedule>  insulin glargine Injectable (LANTUS) 12 Unit(s) SubCutaneous <User Schedule>  insulin lispro (ADMELOG) corrective regimen sliding scale   SubCutaneous every 6 hours  insulin lispro Injectable (ADMELOG) 5 Unit(s) SubCutaneous <User Schedule>  levothyroxine Injectable 87.5 MICROGram(s) IV Push <User Schedule>  lidocaine   4% Patch 1 Patch Transdermal at bedtime  lidocaine   4% Patch 1 Patch Transdermal at bedtime  melatonin Liquid 3 milliGRAM(s) Oral at bedtime  multivitamin/minerals/iron Oral Solution (CENTRUM) 15 milliLiter(s) Oral daily  ondansetron Injectable 4 milliGRAM(s) IV Push every 8 hours PRN  oxyCODONE    Solution 5 milliGRAM(s) Enteral Tube every 4 hours PRN  oxyCODONE    Solution 10 milliGRAM(s) Enteral Tube every 6 hours PRN  pantoprazole  Injectable 40 milliGRAM(s) IV Push daily  prednisoLONE acetate 1% Suspension 1 Drop(s) Both EYES four times a day  predniSONE  Solution 5 milliGRAM(s) Oral <User Schedule>  QUEtiapine 12.5 milliGRAM(s) Oral <User Schedule>  simethicone 80 milliGRAM(s) Chew every 6 hours  sodium chloride 0.65% Nasal 1 Spray(s) Both Nostrils every 12 hours PRN  surgical lubricant sterile 1 Application(s) Topical every 8 hours  vancomycin    Solution 125 milliGRAM(s) Enteral Tube every 12 hours  zinc sulfate 220 milliGRAM(s) Oral two times a day        Review of Systems:  General:  No wt loss, fevers, chills, night sweats, fatigue,   Eyes:  Good vision, no reported pain  ENT:  No sore throat, pain, runny nose, dysphagia  CV:  No pain, palpitations, hypo/hypertension  Resp:  No dyspnea, cough, tachypnea, wheezing  GI:  See HPI  :  No pain, bleeding, incontinence, nocturia  Muscle:  No pain, weakness  Neuro:  No weakness, tingling, memory problems  Psych:  No fatigue, insomnia, mood problems, depression  Endocrine:  No polyuria, polydipsia, cold/heat intolerance  Heme:  No petechiae, ecchymosis, easy bruisability  Integumentary:  No rash, edema    PHYSICAL EXAM:   Vital Signs:  Vital Signs Last 24 Hrs  T(C): 36.4 (22 Apr 2024 11:00), Max: 36.7 (22 Apr 2024 00:00)  T(F): 97.5 (22 Apr 2024 11:00), Max: 98.1 (22 Apr 2024 00:00)  HR: 88 (22 Apr 2024 11:00) (75 - 97)  BP: 131/68 (22 Apr 2024 11:00) (122/69 - 145/68)  BP(mean): --  RR: 18 (22 Apr 2024 11:00) (18 - 23)  SpO2: 100% (22 Apr 2024 11:00) (98% - 100%)    Parameters below as of 22 Apr 2024 11:00  Patient On (Oxygen Delivery Method): ventilator    O2 Concentration (%): 30  Daily     Daily       PHYSICAL EXAM:     GENERAL:  Appears stated age, well-groomed, well-nourished, no distress  HEENT:  NC/AT,  conjunctivae anicteric, clear and pink,   NECK: supple, trachea midline  CHEST:  Full & symmetric excursion, no increased effort, breath sounds clear  HEART:  Regular rhythm, no JVD  ABDOMEN:  Soft, non-tender, non-distended, normoactive bowel sounds,  no masses , no hepatosplenomegaly  EXTREMITIES:  no cyanosis,clubbing or edema  SKIN:  No rash, erythema, or, ecchymoses, no jaundice  NEURO:  Alert, non-focal, no asterixis  PSYCH: Appropriate affect, oriented to place and time  RECTAL: Deferred      LABS Personally reviewed by me:                        8.6    6.80  )-----------( 111      ( 22 Apr 2024 06:37 )             29.0     Mean Cell Volume: 90.6 fl (04-22-24 @ 06:37)    04-22    137  |  100  |  13  ----------------------------<  140<H>  3.7   |  27  |  <0.30<L>    Ca    8.9      22 Apr 2024 06:37  Phos  3.7     04-22  Mg     1.8     04-22    TPro  6.0  /  Alb  2.5<L>  /  TBili  1.0  /  DBili  x   /  AST  66<H>  /  ALT  42  /  AlkPhos  87  04-22    LIVER FUNCTIONS - ( 22 Apr 2024 06:37 )  Alb: 2.5 g/dL / Pro: 6.0 g/dL / ALK PHOS: 87 U/L / ALT: 42 U/L / AST: 66 U/L / GGT: x             Urinalysis Basic - ( 22 Apr 2024 06:37 )    Color: x / Appearance: x / SG: x / pH: x  Gluc: 140 mg/dL / Ketone: x  / Bili: x / Urobili: x   Blood: x / Protein: x / Nitrite: x   Leuk Esterase: x / RBC: x / WBC x   Sq Epi: x / Non Sq Epi: x / Bacteria: x                              8.6    6.80  )-----------( 111      ( 22 Apr 2024 06:37 )             29.0       Imaging personally reviewed by me:

## 2024-04-22 NOTE — PROGRESS NOTE ADULT - SUBJECTIVE AND OBJECTIVE BOX
SURGERY PROGRESS NOTE    Interval events  - Vital signs stable, afebrile, on room air.   - Tolerating tube feeds.    OBJECTIVE:   Vital Signs Last 24 Hrs  T(C): 36.7 (2024 05:40), Max: 36.7 (2024 00:00)  T(F): 98.1 (2024 05:40), Max: 98.1 (2024 00:00)  HR: 90 (2024 09:47) (75 - 97)  BP: 122/69 (2024 05:40) (122/69 - 145/68)  BP(mean): --  RR: 20 (2024 05:40) (20 - 23)  SpO2: 99% (2024 09:47) (98% - 100%)    Parameters below as of 2024 05:40  Patient On (Oxygen Delivery Method): ventilator            I&O's Summary    2024 07:01  -  2024 07:00  --------------------------------------------------------  IN: 1800 mL / OUT: 1200 mL / NET: 600 mL      I&O's Detail    2024 07:01  -  2024 07:00  --------------------------------------------------------  IN:    Enteral Tube Flush: 260 mL    Free Water: 200 mL    Miscellaneous Tube Feedin mL  Total IN: 1800 mL    OUT:    Voided (mL): 1200 mL  Total OUT: 1200 mL    Total NET: 600 mL          MEDICATIONS  (STANDING):  albuterol/ipratropium for Nebulization 3 milliLiter(s) Nebulizer every 6 hours  amLODIPine   Tablet 10 milliGRAM(s) Oral <User Schedule>  artificial tears (preservative free) Ophthalmic Solution 1 Drop(s) Both EYES four times a day  ascorbic acid 500 milliGRAM(s) Oral daily  Biotene Dry Mouth Oral Rinse 5 milliLiter(s) Swish and Spit every 6 hours  chlorhexidine 0.12% Liquid 15 milliLiter(s) Oral Mucosa every 12 hours  chlorhexidine 4% Liquid 1 Application(s) Topical <User Schedule>  cholecalciferol 2000 Unit(s) Oral daily  diclofenac sodium 1% Gel 4 Gram(s) Topical four times a day  erythromycin   Ointment 1 Application(s) Both EYES three times a day  escitalopram 10 milliGRAM(s) Oral <User Schedule>  fentaNYL   Patch  25 MICROgram(s)/Hr 1 Patch Transdermal every 72 hours  gabapentin Solution 100 milliGRAM(s) Oral <User Schedule>  insulin glargine Injectable (LANTUS) 12 Unit(s) SubCutaneous <User Schedule>  insulin lispro (ADMELOG) corrective regimen sliding scale   SubCutaneous every 6 hours  insulin lispro Injectable (ADMELOG) 5 Unit(s) SubCutaneous <User Schedule>  levothyroxine Injectable 87.5 MICROGram(s) IV Push <User Schedule>  lidocaine   4% Patch 1 Patch Transdermal at bedtime  lidocaine   4% Patch 1 Patch Transdermal at bedtime  melatonin Liquid 3 milliGRAM(s) Oral at bedtime  multivitamin/minerals/iron Oral Solution (CENTRUM) 15 milliLiter(s) Oral daily  pantoprazole  Injectable 40 milliGRAM(s) IV Push daily  prednisoLONE acetate 1% Suspension 1 Drop(s) Both EYES four times a day  predniSONE  Solution 5 milliGRAM(s) Oral <User Schedule>  QUEtiapine 12.5 milliGRAM(s) Oral <User Schedule>  simethicone 80 milliGRAM(s) Chew every 6 hours  surgical lubricant sterile 1 Application(s) Topical every 8 hours  vancomycin    Solution 125 milliGRAM(s) Enteral Tube every 12 hours  zinc sulfate 220 milliGRAM(s) Oral two times a day    MEDICATIONS  (PRN):  calamine/zinc oxide Lotion 1 Application(s) Topical daily PRN Rash and/or Itching  ondansetron Injectable 4 milliGRAM(s) IV Push every 8 hours PRN Nausea and/or Vomiting  oxyCODONE    Solution 5 milliGRAM(s) Enteral Tube every 4 hours PRN Moderate Pain (4 - 6)  oxyCODONE    Solution 10 milliGRAM(s) Enteral Tube every 6 hours PRN Severe Pain (7 - 10)  sodium chloride 0.65% Nasal 1 Spray(s) Both Nostrils every 12 hours PRN Congestion    PHYSICAL EXAM  General: No acute distress, resting comfortably in bed.  Abdomen: soft, nondistended, non tender; GJ tube 5cm at the bumper, dressing clean/dry; prior PEG tube site with small amount of clear mucous drainage on dressing      LABS:                        8.6    6.80  )-----------( 111      ( 2024 06:37 )             29.0         137  |  100  |  13  ----------------------------<  140<H>  3.7   |  27  |  <0.30<L>    Ca    8.9      2024 06:37  Phos  3.7     -  Mg     1.8         TPro  6.0  /  Alb  2.5<L>  /  TBili  1.0  /  DBili  x   /  AST  66<H>  /  ALT  42  /  AlkPhos  87        Urinalysis Basic - ( 2024 06:37 )    Color: x / Appearance: x / SG: x / pH: x  Gluc: 140 mg/dL / Ketone: x  / Bili: x / Urobili: x   Blood: x / Protein: x / Nitrite: x   Leuk Esterase: x / RBC: x / WBC x   Sq Epi: x / Non Sq Epi: x / Bacteria: x        RADIOLOGY & ADDITIONAL STUDIES:  no new          Assessment and Plan:   · Assessment	  74F s/p EGD-assisted sutured closure of gastrocutaneous fistula, PEG-J placement 2024, s/p silver nitrate treatment of hypergranulation tissue at the gastrocutaneous fistula wound on .    Plan/Recs  -Continue tube feeds via feeding port of PEG-J as tolerated  -Continue dry dressings daily    ACS/Trauma Surgery  [id number]     SURGERY PROGRESS NOTE    Interval events  - Vital signs stable, afebrile.   - Tolerating tube feeds.    OBJECTIVE:   Vital Signs Last 24 Hrs  T(C): 36.7 (2024 05:40), Max: 36.7 (2024 00:00)  T(F): 98.1 (2024 05:40), Max: 98.1 (2024 00:00)  HR: 90 (2024 09:47) (75 - 97)  BP: 122/69 (2024 05:40) (122/69 - 145/68)  BP(mean): --  RR: 20 (2024 05:40) (20 - 23)  SpO2: 99% (2024 09:47) (98% - 100%)    Parameters below as of 2024 05:40  Patient On (Oxygen Delivery Method): ventilator            I&O's Summary    2024 07:01  -  2024 07:00  --------------------------------------------------------  IN: 1800 mL / OUT: 1200 mL / NET: 600 mL      I&O's Detail    2024 07:01  -  2024 07:00  --------------------------------------------------------  IN:    Enteral Tube Flush: 260 mL    Free Water: 200 mL    Miscellaneous Tube Feedin mL  Total IN: 1800 mL    OUT:    Voided (mL): 1200 mL  Total OUT: 1200 mL    Total NET: 600 mL          MEDICATIONS  (STANDING):  albuterol/ipratropium for Nebulization 3 milliLiter(s) Nebulizer every 6 hours  amLODIPine   Tablet 10 milliGRAM(s) Oral <User Schedule>  artificial tears (preservative free) Ophthalmic Solution 1 Drop(s) Both EYES four times a day  ascorbic acid 500 milliGRAM(s) Oral daily  Biotene Dry Mouth Oral Rinse 5 milliLiter(s) Swish and Spit every 6 hours  chlorhexidine 0.12% Liquid 15 milliLiter(s) Oral Mucosa every 12 hours  chlorhexidine 4% Liquid 1 Application(s) Topical <User Schedule>  cholecalciferol 2000 Unit(s) Oral daily  diclofenac sodium 1% Gel 4 Gram(s) Topical four times a day  erythromycin   Ointment 1 Application(s) Both EYES three times a day  escitalopram 10 milliGRAM(s) Oral <User Schedule>  fentaNYL   Patch  25 MICROgram(s)/Hr 1 Patch Transdermal every 72 hours  gabapentin Solution 100 milliGRAM(s) Oral <User Schedule>  insulin glargine Injectable (LANTUS) 12 Unit(s) SubCutaneous <User Schedule>  insulin lispro (ADMELOG) corrective regimen sliding scale   SubCutaneous every 6 hours  insulin lispro Injectable (ADMELOG) 5 Unit(s) SubCutaneous <User Schedule>  levothyroxine Injectable 87.5 MICROGram(s) IV Push <User Schedule>  lidocaine   4% Patch 1 Patch Transdermal at bedtime  lidocaine   4% Patch 1 Patch Transdermal at bedtime  melatonin Liquid 3 milliGRAM(s) Oral at bedtime  multivitamin/minerals/iron Oral Solution (CENTRUM) 15 milliLiter(s) Oral daily  pantoprazole  Injectable 40 milliGRAM(s) IV Push daily  prednisoLONE acetate 1% Suspension 1 Drop(s) Both EYES four times a day  predniSONE  Solution 5 milliGRAM(s) Oral <User Schedule>  QUEtiapine 12.5 milliGRAM(s) Oral <User Schedule>  simethicone 80 milliGRAM(s) Chew every 6 hours  surgical lubricant sterile 1 Application(s) Topical every 8 hours  vancomycin    Solution 125 milliGRAM(s) Enteral Tube every 12 hours  zinc sulfate 220 milliGRAM(s) Oral two times a day    MEDICATIONS  (PRN):  calamine/zinc oxide Lotion 1 Application(s) Topical daily PRN Rash and/or Itching  ondansetron Injectable 4 milliGRAM(s) IV Push every 8 hours PRN Nausea and/or Vomiting  oxyCODONE    Solution 5 milliGRAM(s) Enteral Tube every 4 hours PRN Moderate Pain (4 - 6)  oxyCODONE    Solution 10 milliGRAM(s) Enteral Tube every 6 hours PRN Severe Pain (7 - 10)  sodium chloride 0.65% Nasal 1 Spray(s) Both Nostrils every 12 hours PRN Congestion    PHYSICAL EXAM  General: No acute distress, resting comfortably in bed.  Abdomen: soft, nondistended, non tender; GJ tube 5cm at the bumper, dressing clean/dry; prior PEG tube site with small amount of clear mucous drainage on dressing      LABS:                        8.6    6.80  )-----------( 111      ( 2024 06:37 )             29.0         137  |  100  |  13  ----------------------------<  140<H>  3.7   |  27  |  <0.30<L>    Ca    8.9      2024 06:37  Phos  3.7       Mg     1.8         TPro  6.0  /  Alb  2.5<L>  /  TBili  1.0  /  DBili  x   /  AST  66<H>  /  ALT  42  /  AlkPhos  87        Urinalysis Basic - ( 2024 06:37 )    Color: x / Appearance: x / SG: x / pH: x  Gluc: 140 mg/dL / Ketone: x  / Bili: x / Urobili: x   Blood: x / Protein: x / Nitrite: x   Leuk Esterase: x / RBC: x / WBC x   Sq Epi: x / Non Sq Epi: x / Bacteria: x        RADIOLOGY & ADDITIONAL STUDIES:  no new          Assessment and Plan:   · Assessment	  74F s/p EGD-assisted sutured closure of gastrocutaneous fistula, PEG-J placement 2024, s/p silver nitrate treatment of hypergranulation tissue at the gastrocutaneous fistula wound on .    Plan/Recs  -Continue tube feeds via feeding port of PEG-J as tolerated  -Continue dry dressings daily    ACS/Trauma Surgery  [id number]

## 2024-04-22 NOTE — PROGRESS NOTE ADULT - PROBLEM SELECTOR PLAN 7
- Possibly reactive in setting of antibiotics/active infection  - History of AOCD  - Patient with slightly elevated INR; S/p Vitk 10mg QD X 3 Days ( Ended 4/19)   - Transfuse for hg < 7.0 and platelets < 10k, < 20k if febrile and < 50k if bleeding ( Heme Reccs)

## 2024-04-22 NOTE — PROGRESS NOTE ADULT - ASSESSMENT
CHW met with patient/family at bedside. Patient experience rounding completed and reviewed the following.     Do you know your discharge plan? Yes home with family     Have you discussed your needs and preferences with your SW/CM? Yes     If you are discharging home, do you have help at home? Yes    Do you think you will need help additional at home at discharge? No     Do you currently have difficulty keeping up with bills, affording medicine or buying food? No      Assigned SW/CM notified of any patient/family needs or concerns. Appropriate resources provided to address patient's needs.     #Gastrostomy malfunction/buried bumper  s/p PEG-J placement 4/16  ongoing surgery care appreciated     #Recurrent C.diff   per ID recs

## 2024-04-22 NOTE — PROGRESS NOTE ADULT - NS ATTEND AMEND GEN_ALL_CORE FT
Agree with above  74F PMH RA, cerebellar aneurysm s/p repair p/w dislodged PEG tube. Now with G-J tube, hospital course c/b CDAD,   - Awake alert responsive, but with waxing and waning mental status  - Trache, on full vent 12/350/30%/+5, attempting PST  - Occasional leaking contents from PEG tube site, now appears to have resolved  - Monitoring off antibiotics (s/p cefepime for Serratia bacteremia) other than PO vancomycin taper  - Family requesting FEEST to evaluate for dysphagia to allow for pleasure feeds  - Dispo: current plan to D/C home with services on Thursday

## 2024-04-22 NOTE — PROGRESS NOTE ADULT - PROBLEM SELECTOR PLAN 8
- Sacral wound present on admission   - Wound Care team continues to follow  - Frequent turning and repositioning  - Continue Local wound care

## 2024-04-22 NOTE — PROGRESS NOTE ADULT - PROBLEM SELECTOR PLAN 10
- Dc planning to home Thursday 4/25   - CM working with Home care agencies / [employer] to ensure services and supplies upon Dc

## 2024-04-22 NOTE — PROGRESS NOTE ADULT - PROBLEM SELECTOR PLAN 9
- Daughter interested in patient having pleasure feeds by mouth.  - Scheduled for FEES Tomorrow   - Daughter provided with medical update over the phone today as well as Father at bedside   - Code Status: DNR

## 2024-04-22 NOTE — PROGRESS NOTE ADULT - ATTENDING COMMENTS
75 yo F PMH T2DM on insulin, HTN, HLD, hypothyroidism, RA on Prednisone, Fibromyalgia, cerebral aneurysm s/p repair, acute hypercapnic respiratory failure s/p trach (vent dependent) and PEG (10/22), bedbound presented from home with complaints of possible G tube dislodgement and redness around the site, admitted for further work up, found to be bacteremic with serratia marcescens during admission. No evidence of infection or endophthalmitis. 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 Some scleral thinning OD superotemporally. Recommend rhuematology consult for systemic steroids. Will follow.
seen and examined 04-22-24 @ 1520    afeb    on mechanical vent (12 / 350 / 30% / +5) via 8.0 [place] XLT distal trach  trach site clean without evidence of infection    soft / NT / ND  gastrocutaneous fistula wound clean with minimal drainage on Aquacel Extra, and skin appears to be closing  PEG-J site clean without evidence of infection  PEG bumper @ 5.0 cm from skin  on [place] 1.5 @ 55 mL/hr via J-tube      WBC = 6      2022 @ 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 continue dry dressings daily.      I reviewed her labs.  care coordinated with RCU team.   plan discussed with her daughter (by phone).
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  -reconsult acute care surgery PRN      I reviewed her labs and radiologic images.  care coordinated with RCU team.
73 yo F PMH T2DM on insulin, HTN, HLD, hypothyroidism, RA on Prednisone, Fibromyalgia, cerebral aneurysm s/p repair, acute hypercapnic respiratory failure s/p trach (vent dependent) and PEG (10/22), bedbound presented from home with complaints of possible G tube dislodgement and redness around the site, admitted for further work up, found to be bacteremic with serratia marcescens during admission. No evidence of infection or endophthalmitis. 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 Some scleral thinning OD superotemporally. Recommend rhuematology consult for systemic steroids. Will follow.
Patient is a 73 yo F w/ HTN, HLD, Hypothyroid, RA (Pred 5mg), chronic Trach/PEG (vent dependent), C diff admitted for dislodged PEG.    #Dislodged PEG  #C diff  #Chronic respiratory failure  - c/w mechanical ventilatory support  - Trach care as per MICU team  - f/u IR consult for PEG replacement, no contrast extravasation on feeding tube check  - Wound care consult  - Nutrition consult  - Monitor off abx for now. f/u ID recs re cellulitis/C diff    Calvin Saravia MD  Pulmonary & Critical Care
n/a

## 2024-04-22 NOTE — PROGRESS NOTE PEDS - SUBJECTIVE AND OBJECTIVE BOX
SURGERY PROGRESS NOTE    Interval events  - Vital signs stable, afebrile, on room air.   - Tolerating tube feeds.    OBJECTIVE:   Vital Signs Last 24 Hrs  T(C): 36.7 (2024 05:40), Max: 36.7 (2024 00:00)  T(F): 98.1 (2024 05:40), Max: 98.1 (2024 00:00)  HR: 90 (2024 09:47) (75 - 97)  BP: 122/69 (2024 05:40) (122/69 - 145/68)  BP(mean): --  RR: 20 (2024 05:40) (20 - 23)  SpO2: 99% (2024 09:47) (98% - 100%)    Parameters below as of 2024 05:40  Patient On (Oxygen Delivery Method): ventilator            I&O's Summary    2024 07:01  -  2024 07:00  --------------------------------------------------------  IN: 1800 mL / OUT: 1200 mL / NET: 600 mL      I&O's Detail    2024 07:01  -  2024 07:00  --------------------------------------------------------  IN:    Enteral Tube Flush: 260 mL    Free Water: 200 mL    Miscellaneous Tube Feedin mL  Total IN: 1800 mL    OUT:    Voided (mL): 1200 mL  Total OUT: 1200 mL    Total NET: 600 mL          MEDICATIONS  (STANDING):  albuterol/ipratropium for Nebulization 3 milliLiter(s) Nebulizer every 6 hours  amLODIPine   Tablet 10 milliGRAM(s) Oral <User Schedule>  artificial tears (preservative free) Ophthalmic Solution 1 Drop(s) Both EYES four times a day  ascorbic acid 500 milliGRAM(s) Oral daily  Biotene Dry Mouth Oral Rinse 5 milliLiter(s) Swish and Spit every 6 hours  chlorhexidine 0.12% Liquid 15 milliLiter(s) Oral Mucosa every 12 hours  chlorhexidine 4% Liquid 1 Application(s) Topical <User Schedule>  cholecalciferol 2000 Unit(s) Oral daily  diclofenac sodium 1% Gel 4 Gram(s) Topical four times a day  erythromycin   Ointment 1 Application(s) Both EYES three times a day  escitalopram 10 milliGRAM(s) Oral <User Schedule>  fentaNYL   Patch  25 MICROgram(s)/Hr 1 Patch Transdermal every 72 hours  gabapentin Solution 100 milliGRAM(s) Oral <User Schedule>  insulin glargine Injectable (LANTUS) 12 Unit(s) SubCutaneous <User Schedule>  insulin lispro (ADMELOG) corrective regimen sliding scale   SubCutaneous every 6 hours  insulin lispro Injectable (ADMELOG) 5 Unit(s) SubCutaneous <User Schedule>  levothyroxine Injectable 87.5 MICROGram(s) IV Push <User Schedule>  lidocaine   4% Patch 1 Patch Transdermal at bedtime  lidocaine   4% Patch 1 Patch Transdermal at bedtime  melatonin Liquid 3 milliGRAM(s) Oral at bedtime  multivitamin/minerals/iron Oral Solution (CENTRUM) 15 milliLiter(s) Oral daily  pantoprazole  Injectable 40 milliGRAM(s) IV Push daily  prednisoLONE acetate 1% Suspension 1 Drop(s) Both EYES four times a day  predniSONE  Solution 5 milliGRAM(s) Oral <User Schedule>  QUEtiapine 12.5 milliGRAM(s) Oral <User Schedule>  simethicone 80 milliGRAM(s) Chew every 6 hours  surgical lubricant sterile 1 Application(s) Topical every 8 hours  vancomycin    Solution 125 milliGRAM(s) Enteral Tube every 12 hours  zinc sulfate 220 milliGRAM(s) Oral two times a day    MEDICATIONS  (PRN):  calamine/zinc oxide Lotion 1 Application(s) Topical daily PRN Rash and/or Itching  ondansetron Injectable 4 milliGRAM(s) IV Push every 8 hours PRN Nausea and/or Vomiting  oxyCODONE    Solution 5 milliGRAM(s) Enteral Tube every 4 hours PRN Moderate Pain (4 - 6)  oxyCODONE    Solution 10 milliGRAM(s) Enteral Tube every 6 hours PRN Severe Pain (7 - 10)  sodium chloride 0.65% Nasal 1 Spray(s) Both Nostrils every 12 hours PRN Congestion    PHYSICAL EXAM  General: No acute distress, resting comfortably in bed.  Abdomen: soft, nondistended, non tender; GJ tube 5cm at the bumper, dressing clean/dry; prior PEG tube site with small amount of clear mucous drainage on dressing      LABS:                        8.6    6.80  )-----------( 111      ( 2024 06:37 )             29.0     04-    137  |  100  |  13  ----------------------------<  140<H>  3.7   |  27  |  <0.30<L>    Ca    8.9      2024 06:37  Phos  3.7     -  Mg     1.8         TPro  6.0  /  Alb  2.5<L>  /  TBili  1.0  /  DBili  x   /  AST  66<H>  /  ALT  42  /  AlkPhos  87  04-22      Urinalysis Basic - ( 2024 06:37 )    Color: x / Appearance: x / SG: x / pH: x  Gluc: 140 mg/dL / Ketone: x  / Bili: x / Urobili: x   Blood: x / Protein: x / Nitrite: x   Leuk Esterase: x / RBC: x / WBC x   Sq Epi: x / Non Sq Epi: x / Bacteria: x        RADIOLOGY & ADDITIONAL STUDIES:  no new

## 2024-04-22 NOTE — PROGRESS NOTE ADULT - PROBLEM SELECTOR PLAN 2
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   - Chest PT, Duonebs q 6 hrs

## 2024-04-22 NOTE — PROGRESS NOTE ADULT - SUBJECTIVE AND OBJECTIVE BOX
Patient is a 74y old  Female who presents with a chief complaint of Dislodged G Tube (21 Apr 2024 11:52)      Interval Events: No events reported overnight     REVIEW OF SYSTEMS:  [ ] Positive  [ ] All other systems negative  [ ] Unable to assess ROS because ________    Vital Signs Last 24 Hrs  T(C): 36.7 (04-22-24 @ 05:40), Max: 36.7 (04-22-24 @ 00:00)  T(F): 98.1 (04-22-24 @ 05:40), Max: 98.1 (04-22-24 @ 00:00)  HR: 90 (04-22-24 @ 08:27) (75 - 97)  BP: 122/69 (04-22-24 @ 05:40) (122/69 - 145/68)  RR: 20 (04-22-24 @ 05:40) (20 - 30)  SpO2: 100% (04-22-24 @ 08:27) (98% - 100%)    PHYSICAL EXAM:  HEENT:   [ ]Tracheostomy:  [ ]Pupils equal  [ ]No oral lesions  [ ]Abnormal    SKIN  [ ]No Rash  [ ] Abnormal  [ ] pressure    CARDIAC  [ ]Regular  [ ]Abnormal    PULMONARY  [ ]Bilateral Clear Breath Sounds  [ ]Normal Excursion  [ ]Abnormal    GI  [ ]PEG      [ ] +BS		              [ ]Soft, nondistended, nontender	  [ ]Abnormal    MUSCULOSKELETAL                                   [ ]Bedbound                 [ ]Abnormal    [ ]Ambulatory/OOB to chair                           EXTREMITIES                                         [ ]Normal  [ ]Edema                           NEUROLOGIC  [ ] Normal, non focal  [ ] Focal findings:    PSYCHIATRIC  [ ]Alert and appropriate  [ ] Sedated	 [ ]Agitated    :  Reed: [ ] Yes, if yes: Date of Placement:                   [  ] No    LINES: Central Lines [ ] Yes, if yes: Date of Placement                                     [  ] No    HOSPITAL MEDICATIONS:  MEDICATIONS  (STANDING):  albuterol/ipratropium for Nebulization 3 milliLiter(s) Nebulizer every 6 hours  amLODIPine   Tablet 10 milliGRAM(s) Oral <User Schedule>  artificial tears (preservative free) Ophthalmic Solution 1 Drop(s) Both EYES four times a day  ascorbic acid 500 milliGRAM(s) Oral daily  Biotene Dry Mouth Oral Rinse 5 milliLiter(s) Swish and Spit every 6 hours  chlorhexidine 0.12% Liquid 15 milliLiter(s) Oral Mucosa every 12 hours  chlorhexidine 4% Liquid 1 Application(s) Topical <User Schedule>  cholecalciferol 2000 Unit(s) Oral daily  diclofenac sodium 1% Gel 4 Gram(s) Topical four times a day  erythromycin   Ointment 1 Application(s) Both EYES three times a day  escitalopram 10 milliGRAM(s) Oral <User Schedule>  fentaNYL   Patch  25 MICROgram(s)/Hr 1 Patch Transdermal every 72 hours  gabapentin Solution 100 milliGRAM(s) Oral <User Schedule>  insulin glargine Injectable (LANTUS) 12 Unit(s) SubCutaneous <User Schedule>  insulin lispro (ADMELOG) corrective regimen sliding scale   SubCutaneous every 6 hours  insulin lispro Injectable (ADMELOG) 5 Unit(s) SubCutaneous <User Schedule>  levothyroxine Injectable 87.5 MICROGram(s) IV Push <User Schedule>  lidocaine   4% Patch 1 Patch Transdermal at bedtime  lidocaine   4% Patch 1 Patch Transdermal at bedtime  melatonin Liquid 3 milliGRAM(s) Oral at bedtime  multivitamin/minerals/iron Oral Solution (CENTRUM) 15 milliLiter(s) Oral daily  pantoprazole  Injectable 40 milliGRAM(s) IV Push daily  prednisoLONE acetate 1% Suspension 1 Drop(s) Both EYES four times a day  predniSONE  Solution 5 milliGRAM(s) Oral <User Schedule>  QUEtiapine 12.5 milliGRAM(s) Oral <User Schedule>  simethicone 80 milliGRAM(s) Chew every 6 hours  surgical lubricant sterile 1 Application(s) Topical every 8 hours  vancomycin    Solution 125 milliGRAM(s) Enteral Tube every 12 hours  zinc sulfate 220 milliGRAM(s) Oral two times a day    MEDICATIONS  (PRN):  calamine/zinc oxide Lotion 1 Application(s) Topical daily PRN Rash and/or Itching  ondansetron Injectable 4 milliGRAM(s) IV Push every 8 hours PRN Nausea and/or Vomiting  oxyCODONE    Solution 5 milliGRAM(s) Enteral Tube every 4 hours PRN Moderate Pain (4 - 6)  oxyCODONE    Solution 10 milliGRAM(s) Enteral Tube every 6 hours PRN Severe Pain (7 - 10)  sodium chloride 0.65% Nasal 1 Spray(s) Both Nostrils every 12 hours PRN Congestion      LABS:                        8.6    6.80  )-----------( 111      ( 22 Apr 2024 06:37 )             29.0     04-22    137  |  100  |  13  ----------------------------<  140<H>  3.7   |  27  |  <0.30<L>    Ca    8.9      22 Apr 2024 06:37  Phos  3.7     04-22  Mg     1.8     04-22    TPro  6.0  /  Alb  2.5<L>  /  TBili  1.0  /  DBili  x   /  AST  66<H>  /  ALT  42  /  AlkPhos  87  04-22      Urinalysis Basic - ( 22 Apr 2024 06:37 )    Color: x / Appearance: x / SG: x / pH: x  Gluc: 140 mg/dL / Ketone: x  / Bili: x / Urobili: x   Blood: x / Protein: x / Nitrite: x   Leuk Esterase: x / RBC: x / WBC x   Sq Epi: x / Non Sq Epi: x / Bacteria: x          CAPILLARY BLOOD GLUCOSE    MICROBIOLOGY:     RADIOLOGY:  [ ] Reviewed and interpreted by me    Mode: AC/ CMV (Assist Control/ Continuous Mandatory Ventilation)  RR (machine): 12  TV (machine): 350  FiO2: 30  PEEP: 5  ITime: 1  MAP: 11  PIP: 24   Patient is a 74y old  Female who presents with a chief complaint of Dislodged G Tube (21 Apr 2024 11:52)      Interval Events: No events reported overnight     REVIEW OF SYSTEMS:  [ ] Positive  [x] All other systems negative  [ ] Unable to assess ROS because ________    Vital Signs Last 24 Hrs  T(C): 36.7 (04-22-24 @ 05:40), Max: 36.7 (04-22-24 @ 00:00)  T(F): 98.1 (04-22-24 @ 05:40), Max: 98.1 (04-22-24 @ 00:00)  HR: 90 (04-22-24 @ 08:27) (75 - 97)  BP: 122/69 (04-22-24 @ 05:40) (122/69 - 145/68)  RR: 20 (04-22-24 @ 05:40) (20 - 30)  SpO2: 100% (04-22-24 @ 08:27) (98% - 100%)    PHYSICAL EXAM:  HEENT:   [X]Tracheostomy: #8 distal XLT cuffed shiley   [X]Pupils equal  [ ]No oral lesions  [ ]Abnormal    SKIN  [ ]No Rash  [ ] Abnormal  [X] pressure - sacral DTI    CARDIAC  [X]Regular  [ ]Abnormal    PULMONARY  [X]Bilateral Clear Breath Sounds  [ ]Normal Excursion  [ ]Abnormal    GI  [X]PEG      [X] +BS		              [X]Soft, nondistended, nontender	  [X]Abnormal - old peg site w/ dressing c/d/i     MUSCULOSKELETAL                                   [X]Bedbound                 [ ]Abnormal    [ ]Ambulatory/OOB to chair                           EXTREMITIES                                         [X]Normal  [ ]Edema                           NEUROLOGIC  [ ] Normal, non focal  [X] Focal findings: awake, alert, following commands on all extremities     PSYCHIATRIC  [X]Alert and appropriate  [ ] Sedated	 [ ]Agitated    :  Mcbride: [ ] Yes, if yes: Date of Placement:                   [X] No    LINES: Central Lines [ ] Yes, if yes: Date of Placement                                     [X] No    HOSPITAL MEDICATIONS:  MEDICATIONS  (STANDING):  albuterol/ipratropium for Nebulization 3 milliLiter(s) Nebulizer every 6 hours  amLODIPine   Tablet 10 milliGRAM(s) Oral <User Schedule>  artificial tears (preservative free) Ophthalmic Solution 1 Drop(s) Both EYES four times a day  ascorbic acid 500 milliGRAM(s) Oral daily  Biotene Dry Mouth Oral Rinse 5 milliLiter(s) Swish and Spit every 6 hours  chlorhexidine 0.12% Liquid 15 milliLiter(s) Oral Mucosa every 12 hours  chlorhexidine 4% Liquid 1 Application(s) Topical <User Schedule>  cholecalciferol 2000 Unit(s) Oral daily  diclofenac sodium 1% Gel 4 Gram(s) Topical four times a day  erythromycin   Ointment 1 Application(s) Both EYES three times a day  escitalopram 10 milliGRAM(s) Oral <User Schedule>  fentaNYL   Patch  25 MICROgram(s)/Hr 1 Patch Transdermal every 72 hours  gabapentin Solution 100 milliGRAM(s) Oral <User Schedule>  insulin glargine Injectable (LANTUS) 12 Unit(s) SubCutaneous <User Schedule>  insulin lispro (ADMELOG) corrective regimen sliding scale   SubCutaneous every 6 hours  insulin lispro Injectable (ADMELOG) 5 Unit(s) SubCutaneous <User Schedule>  levothyroxine Injectable 87.5 MICROGram(s) IV Push <User Schedule>  lidocaine   4% Patch 1 Patch Transdermal at bedtime  lidocaine   4% Patch 1 Patch Transdermal at bedtime  melatonin Liquid 3 milliGRAM(s) Oral at bedtime  multivitamin/minerals/iron Oral Solution (CENTRUM) 15 milliLiter(s) Oral daily  pantoprazole  Injectable 40 milliGRAM(s) IV Push daily  prednisoLONE acetate 1% Suspension 1 Drop(s) Both EYES four times a day  predniSONE  Solution 5 milliGRAM(s) Oral <User Schedule>  QUEtiapine 12.5 milliGRAM(s) Oral <User Schedule>  simethicone 80 milliGRAM(s) Chew every 6 hours  surgical lubricant sterile 1 Application(s) Topical every 8 hours  vancomycin    Solution 125 milliGRAM(s) Enteral Tube every 12 hours  zinc sulfate 220 milliGRAM(s) Oral two times a day    MEDICATIONS  (PRN):  calamine/zinc oxide Lotion 1 Application(s) Topical daily PRN Rash and/or Itching  ondansetron Injectable 4 milliGRAM(s) IV Push every 8 hours PRN Nausea and/or Vomiting  oxyCODONE    Solution 5 milliGRAM(s) Enteral Tube every 4 hours PRN Moderate Pain (4 - 6)  oxyCODONE    Solution 10 milliGRAM(s) Enteral Tube every 6 hours PRN Severe Pain (7 - 10)  sodium chloride 0.65% Nasal 1 Spray(s) Both Nostrils every 12 hours PRN Congestion      LABS:                        8.6    6.80  )-----------( 111      ( 22 Apr 2024 06:37 )             29.0     04-22    137  |  100  |  13  ----------------------------<  140<H>  3.7   |  27  |  <0.30<L>    Ca    8.9      22 Apr 2024 06:37  Phos  3.7     04-22  Mg     1.8     04-22    TPro  6.0  /  Alb  2.5<L>  /  TBili  1.0  /  DBili  x   /  AST  66<H>  /  ALT  42  /  AlkPhos  87  04-22      Urinalysis Basic - ( 22 Apr 2024 06:37 )    Color: x / Appearance: x / SG: x / pH: x  Gluc: 140 mg/dL / Ketone: x  / Bili: x / Urobili: x   Blood: x / Protein: x / Nitrite: x   Leuk Esterase: x / RBC: x / WBC x   Sq Epi: x / Non Sq Epi: x / Bacteria: x          CAPILLARY BLOOD GLUCOSE    MICROBIOLOGY:     RADIOLOGY:  [ ] Reviewed and interpreted by me    Mode: AC/ CMV (Assist Control/ Continuous Mandatory Ventilation)  RR (machine): 12  TV (machine): 350  FiO2: 30  PEEP: 5  ITime: 1  MAP: 11  PIP: 24

## 2024-04-22 NOTE — PROGRESS NOTE PEDS - ASSESSMENT
74F s/p EGD-assisted sutured closure of gastrocutaneous fistula, PEG-J placement 4/16/2024, s/p silver nitrate treatment of hypergranulation tissue at the gastrocutaneous fistula wound on 4/18.    Plan/Recs  -Continue tube feeds via feeding port of PEG-J as tolerated  -Continue dry dressings daily    ACS/Trauma Surgery  [id number]

## 2024-04-22 NOTE — PROGRESS NOTE ADULT - PROBLEM SELECTOR PLAN 4
- IV Vanco/meropenem x1 on admission for concern for cellulitis  - 4/2 blood cultures negative x2, MRSA/MSSA PCR +  - 4/3 urine culture nl vinay, CT A/P 4/2: no infectious focus or colitis  - 4/5 Procal 6.6 increased from 0.08 on 4/3, lactate 4.3  - 4/7 Fever, meropenem restarted - no leukocytosis - RVP neg, UA neg, dopplers neg  - 4/7 blood cultures positive for serratia; txd w/ Meropenem 4/2-4/3 and 4/7-4/11, switched to cefepime 4/11-4/14  - Currently remains Afebrile w/o Leukocytosis

## 2024-04-23 LAB
ALBUMIN SERPL ELPH-MCNC: 2.7 G/DL — LOW (ref 3.3–5)
ALP SERPL-CCNC: 112 U/L — SIGNIFICANT CHANGE UP (ref 40–120)
ALT FLD-CCNC: 51 U/L — HIGH (ref 10–45)
ANION GAP SERPL CALC-SCNC: 11 MMOL/L — SIGNIFICANT CHANGE UP (ref 5–17)
AST SERPL-CCNC: 72 U/L — HIGH (ref 10–40)
BILIRUB SERPL-MCNC: 0.9 MG/DL — SIGNIFICANT CHANGE UP (ref 0.2–1.2)
BUN SERPL-MCNC: 14 MG/DL — SIGNIFICANT CHANGE UP (ref 7–23)
CALCIUM SERPL-MCNC: 8.7 MG/DL — SIGNIFICANT CHANGE UP (ref 8.4–10.5)
CHLORIDE SERPL-SCNC: 100 MMOL/L — SIGNIFICANT CHANGE UP (ref 96–108)
CO2 SERPL-SCNC: 26 MMOL/L — SIGNIFICANT CHANGE UP (ref 22–31)
CREAT SERPL-MCNC: <0.3 MG/DL — LOW (ref 0.5–1.3)
EGFR: 113 ML/MIN/1.73M2 — SIGNIFICANT CHANGE UP
GLUCOSE BLDC GLUCOMTR-MCNC: 220 MG/DL — HIGH (ref 70–99)
GLUCOSE BLDC GLUCOMTR-MCNC: 251 MG/DL — HIGH (ref 70–99)
GLUCOSE BLDC GLUCOMTR-MCNC: 351 MG/DL — HIGH (ref 70–99)
GLUCOSE BLDC GLUCOMTR-MCNC: 74 MG/DL — SIGNIFICANT CHANGE UP (ref 70–99)
GLUCOSE BLDC GLUCOMTR-MCNC: 76 MG/DL — SIGNIFICANT CHANGE UP (ref 70–99)
GLUCOSE SERPL-MCNC: 70 MG/DL — SIGNIFICANT CHANGE UP (ref 70–99)
HCT VFR BLD CALC: 29.5 % — LOW (ref 34.5–45)
HGB BLD-MCNC: 8.8 G/DL — LOW (ref 11.5–15.5)
MAGNESIUM SERPL-MCNC: 1.9 MG/DL — SIGNIFICANT CHANGE UP (ref 1.6–2.6)
MCHC RBC-ENTMCNC: 26.7 PG — LOW (ref 27–34)
MCHC RBC-ENTMCNC: 29.8 GM/DL — LOW (ref 32–36)
MCV RBC AUTO: 89.7 FL — SIGNIFICANT CHANGE UP (ref 80–100)
NRBC # BLD: 0 /100 WBCS — SIGNIFICANT CHANGE UP (ref 0–0)
PHOSPHATE SERPL-MCNC: 3 MG/DL — SIGNIFICANT CHANGE UP (ref 2.5–4.5)
PLATELET # BLD AUTO: 128 K/UL — LOW (ref 150–400)
POTASSIUM SERPL-MCNC: 3.8 MMOL/L — SIGNIFICANT CHANGE UP (ref 3.5–5.3)
POTASSIUM SERPL-SCNC: 3.8 MMOL/L — SIGNIFICANT CHANGE UP (ref 3.5–5.3)
PROT SERPL-MCNC: 6.3 G/DL — SIGNIFICANT CHANGE UP (ref 6–8.3)
RBC # BLD: 3.29 M/UL — LOW (ref 3.8–5.2)
RBC # FLD: 22.2 % — HIGH (ref 10.3–14.5)
SODIUM SERPL-SCNC: 137 MMOL/L — SIGNIFICANT CHANGE UP (ref 135–145)
T4 AB SER-ACNC: 8.9 UG/DL — SIGNIFICANT CHANGE UP (ref 4.6–12)
TSH SERPL-MCNC: 0.13 UIU/ML — LOW (ref 0.27–4.2)
WBC # BLD: 7.88 K/UL — SIGNIFICANT CHANGE UP (ref 3.8–10.5)
WBC # FLD AUTO: 7.88 K/UL — SIGNIFICANT CHANGE UP (ref 3.8–10.5)

## 2024-04-23 PROCEDURE — 99232 SBSQ HOSP IP/OBS MODERATE 35: CPT

## 2024-04-23 PROCEDURE — 99233 SBSQ HOSP IP/OBS HIGH 50: CPT | Mod: FS

## 2024-04-23 RX ORDER — INSULIN HUMAN 100 [IU]/ML
7 INJECTION, SOLUTION SUBCUTANEOUS
Refills: 0 | Status: DISCONTINUED | OUTPATIENT
Start: 2024-04-23 | End: 2024-04-25

## 2024-04-23 RX ORDER — MAGNESIUM SULFATE 500 MG/ML
2 VIAL (ML) INJECTION ONCE
Refills: 0 | Status: COMPLETED | OUTPATIENT
Start: 2024-04-23 | End: 2024-04-23

## 2024-04-23 RX ADMIN — DICLOFENAC SODIUM 4 GRAM(S): 30 GEL TOPICAL at 17:59

## 2024-04-23 RX ADMIN — LIDOCAINE 1 PATCH: 4 CREAM TOPICAL at 22:22

## 2024-04-23 RX ADMIN — Medication 2000 UNIT(S): at 12:38

## 2024-04-23 RX ADMIN — INSULIN HUMAN 7 UNIT(S): 100 INJECTION, SOLUTION SUBCUTANEOUS at 00:05

## 2024-04-23 RX ADMIN — LIDOCAINE 1 PATCH: 4 CREAM TOPICAL at 08:56

## 2024-04-23 RX ADMIN — Medication 1 DROP(S): at 23:56

## 2024-04-23 RX ADMIN — Medication 3 MILLILITER(S): at 17:16

## 2024-04-23 RX ADMIN — Medication 125 MILLIGRAM(S): at 06:19

## 2024-04-23 RX ADMIN — Medication 1 APPLICATION(S): at 22:23

## 2024-04-23 RX ADMIN — INSULIN HUMAN 10: 100 INJECTION, SOLUTION SUBCUTANEOUS at 17:57

## 2024-04-23 RX ADMIN — SIMETHICONE 80 MILLIGRAM(S): 80 TABLET, CHEWABLE ORAL at 17:58

## 2024-04-23 RX ADMIN — INSULIN HUMAN 4: 100 INJECTION, SOLUTION SUBCUTANEOUS at 23:56

## 2024-04-23 RX ADMIN — Medication 1 APPLICATION(S): at 14:50

## 2024-04-23 RX ADMIN — Medication 5 MILLILITER(S): at 06:21

## 2024-04-23 RX ADMIN — Medication 1 DROP(S): at 00:04

## 2024-04-23 RX ADMIN — ESCITALOPRAM OXALATE 10 MILLIGRAM(S): 10 TABLET, FILM COATED ORAL at 17:58

## 2024-04-23 RX ADMIN — INSULIN HUMAN 0: 100 INJECTION, SOLUTION SUBCUTANEOUS at 06:18

## 2024-04-23 RX ADMIN — Medication 500 MILLIGRAM(S): at 12:38

## 2024-04-23 RX ADMIN — DICLOFENAC SODIUM 4 GRAM(S): 30 GEL TOPICAL at 06:21

## 2024-04-23 RX ADMIN — Medication 3 MILLILITER(S): at 23:30

## 2024-04-23 RX ADMIN — Medication 125 MICROGRAM(S): at 04:12

## 2024-04-23 RX ADMIN — CHLORHEXIDINE GLUCONATE 1 APPLICATION(S): 213 SOLUTION TOPICAL at 06:21

## 2024-04-23 RX ADMIN — Medication 5 MILLILITER(S): at 17:57

## 2024-04-23 RX ADMIN — INSULIN HUMAN 7 UNIT(S): 100 INJECTION, SOLUTION SUBCUTANEOUS at 12:37

## 2024-04-23 RX ADMIN — SIMETHICONE 80 MILLIGRAM(S): 80 TABLET, CHEWABLE ORAL at 12:38

## 2024-04-23 RX ADMIN — INSULIN HUMAN 7 UNIT(S): 100 INJECTION, SOLUTION SUBCUTANEOUS at 17:57

## 2024-04-23 RX ADMIN — LIDOCAINE 1 PATCH: 4 CREAM TOPICAL at 22:23

## 2024-04-23 RX ADMIN — CHLORHEXIDINE GLUCONATE 15 MILLILITER(S): 213 SOLUTION TOPICAL at 06:21

## 2024-04-23 RX ADMIN — DICLOFENAC SODIUM 4 GRAM(S): 30 GEL TOPICAL at 00:05

## 2024-04-23 RX ADMIN — Medication 1 DROP(S): at 17:58

## 2024-04-23 RX ADMIN — Medication 1 DROP(S): at 06:19

## 2024-04-23 RX ADMIN — GABAPENTIN 100 MILLIGRAM(S): 400 CAPSULE ORAL at 09:04

## 2024-04-23 RX ADMIN — QUETIAPINE FUMARATE 12.5 MILLIGRAM(S): 200 TABLET, FILM COATED ORAL at 12:40

## 2024-04-23 RX ADMIN — PANTOPRAZOLE SODIUM 40 MILLIGRAM(S): 20 TABLET, DELAYED RELEASE ORAL at 12:38

## 2024-04-23 RX ADMIN — ZINC SULFATE TAB 220 MG (50 MG ZINC EQUIVALENT) 220 MILLIGRAM(S): 220 (50 ZN) TAB at 17:58

## 2024-04-23 RX ADMIN — Medication 3 MILLIGRAM(S): at 22:22

## 2024-04-23 RX ADMIN — Medication 5 MILLILITER(S): at 12:39

## 2024-04-23 RX ADMIN — Medication 1 DROP(S): at 12:39

## 2024-04-23 RX ADMIN — Medication 5 MILLILITER(S): at 23:56

## 2024-04-23 RX ADMIN — INSULIN HUMAN 7 UNIT(S): 100 INJECTION, SOLUTION SUBCUTANEOUS at 06:17

## 2024-04-23 RX ADMIN — Medication 5 MILLIGRAM(S): at 10:25

## 2024-04-23 RX ADMIN — GABAPENTIN 100 MILLIGRAM(S): 400 CAPSULE ORAL at 21:58

## 2024-04-23 RX ADMIN — Medication 3 MILLILITER(S): at 11:14

## 2024-04-23 RX ADMIN — SIMETHICONE 80 MILLIGRAM(S): 80 TABLET, CHEWABLE ORAL at 00:03

## 2024-04-23 RX ADMIN — SIMETHICONE 80 MILLIGRAM(S): 80 TABLET, CHEWABLE ORAL at 23:56

## 2024-04-23 RX ADMIN — Medication 3 MILLILITER(S): at 05:33

## 2024-04-23 RX ADMIN — Medication 1 DROP(S): at 18:00

## 2024-04-23 RX ADMIN — LIDOCAINE 1 PATCH: 4 CREAM TOPICAL at 10:35

## 2024-04-23 RX ADMIN — Medication 125 MILLIGRAM(S): at 17:57

## 2024-04-23 RX ADMIN — Medication 1 APPLICATION(S): at 06:20

## 2024-04-23 RX ADMIN — Medication 1 DROP(S): at 00:03

## 2024-04-23 RX ADMIN — INSULIN HUMAN 6: 100 INJECTION, SOLUTION SUBCUTANEOUS at 12:37

## 2024-04-23 RX ADMIN — DICLOFENAC SODIUM 4 GRAM(S): 30 GEL TOPICAL at 23:57

## 2024-04-23 RX ADMIN — Medication 25 GRAM(S): at 10:25

## 2024-04-23 RX ADMIN — CHLORHEXIDINE GLUCONATE 15 MILLILITER(S): 213 SOLUTION TOPICAL at 17:57

## 2024-04-23 RX ADMIN — SIMETHICONE 80 MILLIGRAM(S): 80 TABLET, CHEWABLE ORAL at 06:18

## 2024-04-23 RX ADMIN — LIDOCAINE 1 PATCH: 4 CREAM TOPICAL at 08:55

## 2024-04-23 RX ADMIN — Medication 5 MILLILITER(S): at 00:03

## 2024-04-23 RX ADMIN — INSULIN HUMAN 6: 100 INJECTION, SOLUTION SUBCUTANEOUS at 00:06

## 2024-04-23 RX ADMIN — AMLODIPINE BESYLATE 10 MILLIGRAM(S): 2.5 TABLET ORAL at 09:04

## 2024-04-23 RX ADMIN — Medication 15 MILLILITER(S): at 12:38

## 2024-04-23 RX ADMIN — DICLOFENAC SODIUM 4 GRAM(S): 30 GEL TOPICAL at 12:39

## 2024-04-23 RX ADMIN — ZINC SULFATE TAB 220 MG (50 MG ZINC EQUIVALENT) 220 MILLIGRAM(S): 220 (50 ZN) TAB at 06:18

## 2024-04-23 RX ADMIN — INSULIN GLARGINE 12 UNIT(S): 100 INJECTION, SOLUTION SUBCUTANEOUS at 17:58

## 2024-04-23 NOTE — SWALLOW FEES ASSESSMENT ADULT - H & P REVIEW
MAXX LOPEZ is a 73 yo F PMH T2DM on insulin, HTN, HLD, hypothyroidism, RA on Prednisone, Fibromyalgia, cerebral aneurysm s/p repair, acute hypercapnic respiratory failure s/p trach (vent dependent) and PEG (10/22), bedbound presented from home with complaints of possible G tube dislodgement and redness around the site.  CT Abdomen/Pelvis done showing dislodgement of G tube with balloon in the anterior abdominal wall with air filled track to stomach.  GI contacted, but unable to replace at bedside given placement of balloon. Surgery consulted--recommend gastrograffin study to eval placement of PEG and possible IR replacement in AM./yes

## 2024-04-23 NOTE — PROGRESS NOTE ADULT - SUBJECTIVE AND OBJECTIVE BOX
seen earlier today     Chief Complaint: Diabetes Mellitus follow up    INTERVAL HX: patient sleeping at time of visit, per aide at bedside patient had 4x BM overnight, On TF 6am-12MN , received regular insulin with TF off at MN       Review of Systems:  nonverbal     Allergies    pineapple (Unknown)  Tagamet (Unknown)  heparin (Unknown)  walnut (Unknown)  metronidazole (Rash)  penicillin (Unknown)  Lyrica (Unknown)  meropenem (Rash)  Pecan, Filbert, Hazelnut (Unknown)    Intolerances      MEDICATIONS  (STANDING):  albuterol/ipratropium for Nebulization 3 milliLiter(s) Nebulizer every 6 hours  amLODIPine   Tablet 10 milliGRAM(s) Oral <User Schedule>  artificial tears (preservative free) Ophthalmic Solution 1 Drop(s) Both EYES four times a day  ascorbic acid 500 milliGRAM(s) Oral daily  Biotene Dry Mouth Oral Rinse 5 milliLiter(s) Swish and Spit every 6 hours  chlorhexidine 0.12% Liquid 15 milliLiter(s) Oral Mucosa every 12 hours  chlorhexidine 4% Liquid 1 Application(s) Topical <User Schedule>  cholecalciferol 2000 Unit(s) Oral daily  diclofenac sodium 1% Gel 4 Gram(s) Topical four times a day  erythromycin   Ointment 1 Application(s) Both EYES three times a day  escitalopram 10 milliGRAM(s) Oral <User Schedule>  fentaNYL   Patch  25 MICROgram(s)/Hr 1 Patch Transdermal every 72 hours  gabapentin Solution 100 milliGRAM(s) Oral <User Schedule>  insulin glargine Injectable (LANTUS) 12 Unit(s) SubCutaneous <User Schedule>  insulin regular  human corrective regimen sliding scale   SubCutaneous every 6 hours  insulin regular  human recombinant 7 Unit(s) SubCutaneous every 6 hours  levothyroxine 125 MICROGram(s) Oral daily  lidocaine   4% Patch 1 Patch Transdermal at bedtime  lidocaine   4% Patch 1 Patch Transdermal at bedtime  melatonin Liquid 3 milliGRAM(s) Oral at bedtime  multivitamin/minerals/iron Oral Solution (CENTRUM) 15 milliLiter(s) Oral daily  pantoprazole  Injectable 40 milliGRAM(s) IV Push daily  prednisoLONE acetate 1% Suspension 1 Drop(s) Both EYES four times a day  predniSONE  Solution 5 milliGRAM(s) Oral <User Schedule>  QUEtiapine 12.5 milliGRAM(s) Oral <User Schedule>  simethicone 80 milliGRAM(s) Chew every 6 hours  surgical lubricant sterile 1 Application(s) Topical every 8 hours  vancomycin    Solution 125 milliGRAM(s) Enteral Tube every 12 hours  zinc sulfate 220 milliGRAM(s) Oral two times a day        insulin glargine Injectable (LANTUS)   12 Unit(s) SubCutaneous (04-22-24 @ 18:17)    insulin regular  human corrective regimen sliding scale   6 Unit(s) SubCutaneous (04-23-24 @ 12:37)   0 Unit(s) SubCutaneous (04-23-24 @ 06:18)   6 Unit(s) SubCutaneous (04-23-24 @ 00:06)   8 Unit(s) SubCutaneous (04-22-24 @ 18:26)    insulin regular  human recombinant   7 Unit(s) SubCutaneous (04-23-24 @ 12:37)   7 Unit(s) SubCutaneous (04-23-24 @ 06:17)   7 Unit(s) SubCutaneous (04-23-24 @ 00:05)   7 Unit(s) SubCutaneous (04-22-24 @ 18:26)    levothyroxine   125 MICROGram(s) Oral (04-23-24 @ 04:12)    predniSONE  Solution   5 milliGRAM(s) Oral (04-23-24 @ 10:25)        PHYSICAL EXAM:  VITALS: T(C): 36.6 (04-23-24 @ 11:39)  T(F): 97.8 (04-23-24 @ 11:39), Max: 98.6 (04-23-24 @ 00:00)  HR: 97 (04-23-24 @ 11:39) (74 - 97)  BP: 122/67 (04-23-24 @ 11:39) (122/67 - 139/65)  RR:  (16 - 18)  SpO2:  (97% - 100%)  Wt(kg): --  GENERAL: NAD peg with tf   Respiratory: Respirations unlabored  trach to vent   Extremities: Warm, no edema  NEURO: sleeping    LABS:  POCT Blood Glucose.: 251 mg/dL (04-23-24 @ 12:06)  POCT Blood Glucose.: 76 mg/dL (04-23-24 @ 06:12)  POCT Blood Glucose.: 74 mg/dL (04-23-24 @ 06:10)  POCT Blood Glucose.: 257 mg/dL (04-22-24 @ 23:58)  POCT Blood Glucose.: 347 mg/dL (04-22-24 @ 17:50)  POCT Blood Glucose.: 260 mg/dL (04-22-24 @ 11:57)  POCT Blood Glucose.: 136 mg/dL (04-22-24 @ 05:49)  POCT Blood Glucose.: 220 mg/dL (04-21-24 @ 23:59)  POCT Blood Glucose.: 253 mg/dL (04-21-24 @ 18:46)  POCT Blood Glucose.: 275 mg/dL (04-21-24 @ 17:38)  POCT Blood Glucose.: 265 mg/dL (04-21-24 @ 12:20)  POCT Blood Glucose.: 108 mg/dL (04-21-24 @ 05:31)  POCT Blood Glucose.: 184 mg/dL (04-21-24 @ 00:14)  POCT Blood Glucose.: 364 mg/dL (04-20-24 @ 17:52)                          8.8    7.88  )-----------( 128      ( 23 Apr 2024 04:35 )             29.5     04-23    137  |  100  |  14  ----------------------------<  70  3.8   |  26  |  <0.30<L>    Ca    8.7      23 Apr 2024 04:35  Phos  3.0     04-23  Mg     1.9     04-23    TPro  6.3  /  Alb  2.7<L>  /  TBili  0.9  /  DBili  x   /  AST  72<H>  /  ALT  51<H>  /  AlkPhos  112  04-23    eGFR: 113 mL/min/1.73m2 (23 Apr 2024 04:35)    04-04 Chol 130 Direct LDL -- LDL calculated 63 HDL 20<L> Trig 295<H>  Thyroid Function Tests:  04-23 @ 04:35 TSH 0.13 FreeT4 -- T3 -- Anti TPO -- Anti Thyroglobulin Ab -- TSI --      A1C with Estimated Average Glucose Result: 6.2 % (04-04-24 @ 07:40)    Estimated Average Glucose: 131 mg/dL (04-04-24 @ 07:40)        Diet, NPO with Tube Feed:   Tube Feeding Modality: Jejunostomy  [place] Peptide 1.5 (KFPEPT1.5RTH)  Total Volume for 24 Hours (mL): 990  Continuous  Starting Tube Feed Rate mL per Hour: 50  Increase Tube Feed Rate by (mL): 5     Every 24 hours  Until Goal Tube Feed Rate (mL per Hour): 55  Tube Feed Duration (in Hours): 18  Tube Feed Start Time: 06:00  Tube Feed Stop Time: 00:00  Free Water Flush  Pump   Rate (mL per Hour): 20   Frequency: Every 2 Hours  Alfred(7 Gm Arginine/7 Gm Glut/1.2 Gm HMB     Qty per Day:  2  No Carb Prosource TF     Qty per Day:  1 (04-21-24 @ 09:14) [Active]

## 2024-04-23 NOTE — SWALLOW FEES ASSESSMENT ADULT - PHARYNGEAL PHASE COMMENTS
Hyolaryngeal elevation and excursion were judged to be inconsistently reduced and resulted in incomplete epiglottic inversion, as evidenced by incomplete white out phase, which appeared to occur as trials progressed. Pharyngeal phase p/w latency in the swallow trigger to level of valleculae, which appear to increase in length of latency of swallow trigger as study progressed with spillover down the lateral channels to pyriform sinuses. Shallow penetration on laryngeal surface of epiglottis present which did not appear to eject.  Noted pt with 2-4 dry swallows with each presentation in absence of aspiration with trace to absent pharyngeal residue with all textures. No aspiration captured however pt at high risk given progressive incoordination of breathe-swallow pattern as study continued. Hyolaryngeal elevation and excursion were judged to be inconsistently reduced and resulted in incomplete epiglottic inversion, as evidenced by incomplete white out phase, which appeared to occur as trials progressed. Pharyngeal phase p/w latency in the swallow trigger to level of valleculae, which appear to increase in length of latency of swallow trigger as study progressed with spillover down the lateral channels to pyriform sinuses. Shallow penetration on laryngeal surface of epiglottis.  Noted pt with 2-4 dry swallows with each presentation in absence of aspiration with trace to absent pharyngeal residue with all textures.

## 2024-04-23 NOTE — SWALLOW FEES ASSESSMENT ADULT - SLP GENERAL OBSERVATIONS
Encountered in bed with aide and spouse bedside. RT Fazal present to ensure no need for intervention during intervention. VENT PARAMETERS: VOLUME , 12/30%/5

## 2024-04-23 NOTE — PROGRESS NOTE ADULT - PROBLEM SELECTOR PLAN 1
- Patient p/w PEG tube dislodgement on admission   - CT A/P 4/2: Dislodged G tube with balloon in the anterior abdominal wall with air filled track to stomach  - S/p bedside exchange by IR on 4/3 ( # 20 Fr Kangaroo EnFit placed )  - 4/4: PEG leaking; IR adjusted PEG at bedside  - 4/5: PEG still leaking, adjusted at bedside by IR Attending.  - 4/8-4/9 IR aborted procedure; GI Dr. Cleaning consulted for new PEG  - 4/9 PEG fell out spontaneously, Mcbirde placed into tract   - 4/12: Mcbride replaced with 18Fr PEG at bedside, bumper at 8cm (intentionally loose)  - CT A/P 4/12: G-tube in the stomach/Mild inflammation thickening along tract   - 4/14 18 fr PEG was used, feeds were re-initiated, attempt failed, PEG with copious amounts of leakage  - 4/16 S/p new PEG-J and closure of gastric fistula in endo suite with GI and surgery  - Patient currently tolerating TFs at goal rate

## 2024-04-23 NOTE — PROGRESS NOTE ADULT - ASSESSMENT
75 yo F PMH T2DM on insulin, HTN, HLD, hypothyroidism, RA on Prednisone, Fibromyalgia, cerebral aneurysm s/p repair, chronic hypercapnic respiratory failure s/p trach (vent dependent) and Chronic PEG 2022, recurrent C. diff infection (follows with Dr. Newman) , bedbound who presented from home with G tube dislodgement and redness around the site. Had CT Abdomen/Pelvis done showing dislodgement of G tube with balloon in the anterior abdominal wall with air filled track to stomach. Admitted to MICU for ventilator management and given vancomycin/meropenem empirically for treatment of abd wall cellulitis. Per family patient has had Known c diff infection for past 2-3 weeks.  and was being Treated with Fidaxomicin.  IR team exchanged PEG on 4/3 however later in the day it remained with leakage.  IR Attending adjusted PEG at bedside on 4/4 and PEG remained with moderate amount of PEG leakage once feeds initiated at reduced rate.  GI team re-consulted as second opinion for PEG management.  On 4/9 PEG was found out of place, a emerson catheter was placed in the stoma to maintain patency.  Patient was planned for PEG revision with both Surgery and GI team involved on 4/11 however patient had fevers up to 102F and procedure was cancelled for infectious work-up.  An 18 Fr PEG tube placed at bedside on 4/12 (original PEG size was 20Fr) as stoma site appears to have closed. Antibiotics were switched from Meropenem to Cefepime, completed 4/14. Patient S/p PEG-J and closure of gastric fistula with GI and surgery in endo suite on 4/16.      4/22: Stable no acute events, FEES Scheduled for tomorrow. CM Continues to work with Home Care agencies to arrange for dc on 4/25. Family to bring in Home Vent tomorrow in anticipation for Home Vent trial on 4/24.  73 yo F PMH T2DM on insulin, HTN, HLD, hypothyroidism, RA on Prednisone, Fibromyalgia, cerebral aneurysm s/p repair, chronic hypercapnic respiratory failure s/p trach (vent dependent) and Chronic PEG 2022, recurrent C. diff infection (follows with Dr. Newman) , bedbound who presented from home with G tube dislodgement and redness around the site. Had CT Abdomen/Pelvis done showing dislodgement of G tube with balloon in the anterior abdominal wall with air filled track to stomach. Admitted to MICU for ventilator management and given vancomycin/meropenem empirically for treatment of abd wall cellulitis. Per family patient has had Known c diff infection for past 2-3 weeks and was being treated with Fidaxomicin, which completed inpatient.  IR team exchanged PEG on 4/3 however later in the day it remained with leakage.  IR Attending adjusted PEG at bedside on 4/4 and PEG remained with moderate amount of PEG leakage once feeds initiated at reduced rate.  GI team re-consulted as second opinion for PEG management.  On 4/9 PEG was found out of place, a emerson catheter was placed in the stoma to maintain patency.  Patient was planned for PEG revision with both Surgery and GI team involved on 4/11 however patient had fevers up to 102F and procedure was cancelled for infectious work-up.  An 18 Fr PEG tube placed at bedside on 4/12 (original PEG size was 20Fr) as stoma site appears to have closed. Antibiotics were switched from Meropenem to Cefepime, completed 4/14. Patient now s/p PEG-J and closure of gastric fistula with GI and surgery in endo suite on 4/16.  New stoma site with no leakage, pt has been tolerating feeds.  A FEES was performed on 4/23 with recs for comfort feeds with parameters as per SLP.  Pt to continue vanco taper until 05/23 for c diff treatment with outpatient follow up with Dr. Newman.  Continuing to pressure support as tolerated.  Pt to return home.      4/23:  FEES performed today - SLP recs appreciated - primary rec NPO with comfort feeds within parameters.  Pt otherwise has remained stable.  Continues to tolerate tube feeds at goal.  Daughter still with concerts with amount/frequency of stools, which we will continue to monitor for now.  No other procedures/tests currently pending.  75 yo F PMH T2DM on insulin, HTN, HLD, hypothyroidism, RA on Prednisone, Fibromyalgia, cerebral aneurysm s/p repair, chronic hypercapnic respiratory failure s/p trach (vent dependent) and Chronic PEG 2022, recurrent C. diff infection (follows with Dr. Newman) , bedbound who presented from home with G tube dislodgement and redness around the site. Had CT Abdomen/Pelvis done showing dislodgement of G tube with balloon in the anterior abdominal wall with air filled track to stomach. Admitted to MICU for ventilator management and given vancomycin/meropenem empirically for treatment of abd wall cellulitis. Per family patient has had Known c diff infection for past 2-3 weeks and was being treated with Fidaxomicin, which completed inpatient.  IR team exchanged PEG on 4/3 however later in the day it remained with leakage.  IR Attending adjusted PEG at bedside on 4/4 and PEG remained with moderate amount of PEG leakage once feeds initiated at reduced rate.  GI team re-consulted as second opinion for PEG management.  On 4/9 PEG was found out of place, a emerson catheter was placed in the stoma to maintain patency.  Patient was planned for PEG revision with both Surgery and GI team involved on 4/11 however patient had fevers up to 102F and procedure was cancelled for infectious work-up.  An 18 Fr PEG tube placed at bedside on 4/12 (original PEG size was 20Fr) as stoma site appears to have closed. Antibiotics were switched from Meropenem to Cefepime, completed 4/14. Patient now s/p PEG-J and closure of gastric fistula with GI and surgery in endo suite on 4/16.  New stoma site with no leakage, pt has been tolerating feeds.  A FEES was performed on 4/23 with recs for comfort feeds with parameters as per SLP.  Pt to continue vanco taper until 05/23 for c diff treatment with outpatient follow up with Dr. Newman.  Continuing to pressure support as tolerated.  Pt to return home.      4/23:  FEES performed today - SLP recs appreciated - primary rec NPO with comfort feeds within parameters.  Pt otherwise has remained stable.  Continues to tolerate tube feeds at goal.  Daughter still with concerts with amount/frequency of stools, which we will continue to monitor for now.  No other procedures/tests currently pending.  STILL AWAITING BIOMED TO CLEAR VENT.

## 2024-04-23 NOTE — SWALLOW FEES ASSESSMENT ADULT - CONSISTENCIES ADMINISTERED
ice chips x2, puree thin and thick -applesauce and pudding, thin liquids tsp and straw approx 2-3 ounces of thin liquids for exhaustive trials/thin liquid/pureed minced & moist

## 2024-04-23 NOTE — SWALLOW FEES ASSESSMENT ADULT - RECOMMENDED CONSISTENCY
Primary recommendation is NPO however with keeping aligned to goc/poc and for quality of life purposes would allow comfort feeds with certain parameters. Puree and thin liquids (tsp or straw) only allowed 1-2x/day with 100% assist/supervision with educated staff/family, 2-4 ounces total only, and 10 minute maximum with feeding task. Ensure pt as close to 90 degrees as able. Ensure pt on VENT PARAMETERS that were present during this study; VOLUME , 12/30%/5

## 2024-04-23 NOTE — PROGRESS NOTE ADULT - PROBLEM SELECTOR PLAN 9
- Daughter interested in patient having pleasure feeds by mouth.  - Scheduled for FEES Tomorrow   - Daughter provided with medical update over the phone today as well as Father at bedside   - Code Status: DNR - Code Status: DNR  - FEES 4/23 - rec for comfort feeds with parameters  - Daughter/pts  involved in plan of care

## 2024-04-23 NOTE — PROGRESS NOTE ADULT - SUBJECTIVE AND OBJECTIVE BOX
Patient is a 74y old  Female who presents with a chief complaint of Dislodged G Tube (22 Apr 2024 19:52)      Interval Events:    REVIEW OF SYSTEMS:  [ ] Positive  [ ] All other systems negative  [ ] Unable to assess ROS because ________    Vital Signs Last 24 Hrs  T(C): 36.9 (04-23-24 @ 05:12), Max: 37 (04-23-24 @ 00:00)  T(F): 98.5 (04-23-24 @ 05:12), Max: 98.6 (04-23-24 @ 00:00)  HR: 76 (04-23-24 @ 05:55) (74 - 96)  BP: 131/61 (04-23-24 @ 05:12) (131/61 - 139/65)  RR: 16 (04-23-24 @ 05:12) (16 - 18)  SpO2: 99% (04-23-24 @ 05:55) (98% - 100%)    PHYSICAL EXAM:  HEENT:   [ ]Tracheostomy:  [ ]Pupils equal  [ ]No oral lesions  [ ]Abnormal    SKIN  [ ]No Rash  [ ] Abnormal  [ ] pressure    CARDIAC  [ ]Regular  [ ]Abnormal    PULMONARY  [ ]Bilateral Clear Breath Sounds  [ ]Normal Excursion  [ ]Abnormal    GI  [ ]PEG      [ ] +BS		              [ ]Soft, nondistended, nontender	  [ ]Abnormal    MUSCULOSKELETAL                                   [ ]Bedbound                 [ ]Abnormal    [ ]Ambulatory/OOB to chair                           EXTREMITIES                                         [ ]Normal  [ ]Edema                           NEUROLOGIC  [ ] Normal, non focal  [ ] Focal findings:    PSYCHIATRIC  [ ]Alert and appropriate  [ ] Sedated	 [ ]Agitated    :  Mcbride: [ ] Yes, if yes: Date of Placement:                   [  ] No    LINES: Central Lines [ ] Yes, if yes: Date of Placement                                     [  ] No    HOSPITAL MEDICATIONS:  MEDICATIONS  (STANDING):  albuterol/ipratropium for Nebulization 3 milliLiter(s) Nebulizer every 6 hours  amLODIPine   Tablet 10 milliGRAM(s) Oral <User Schedule>  artificial tears (preservative free) Ophthalmic Solution 1 Drop(s) Both EYES four times a day  ascorbic acid 500 milliGRAM(s) Oral daily  Biotene Dry Mouth Oral Rinse 5 milliLiter(s) Swish and Spit every 6 hours  chlorhexidine 0.12% Liquid 15 milliLiter(s) Oral Mucosa every 12 hours  chlorhexidine 4% Liquid 1 Application(s) Topical <User Schedule>  cholecalciferol 2000 Unit(s) Oral daily  diclofenac sodium 1% Gel 4 Gram(s) Topical four times a day  erythromycin   Ointment 1 Application(s) Both EYES three times a day  escitalopram 10 milliGRAM(s) Oral <User Schedule>  fentaNYL   Patch  25 MICROgram(s)/Hr 1 Patch Transdermal every 72 hours  gabapentin Solution 100 milliGRAM(s) Oral <User Schedule>  insulin glargine Injectable (LANTUS) 12 Unit(s) SubCutaneous <User Schedule>  insulin regular  human corrective regimen sliding scale   SubCutaneous every 6 hours  insulin regular  human recombinant 7 Unit(s) SubCutaneous every 6 hours  levothyroxine 125 MICROGram(s) Oral daily  lidocaine   4% Patch 1 Patch Transdermal at bedtime  lidocaine   4% Patch 1 Patch Transdermal at bedtime  melatonin Liquid 3 milliGRAM(s) Oral at bedtime  multivitamin/minerals/iron Oral Solution (CENTRUM) 15 milliLiter(s) Oral daily  pantoprazole  Injectable 40 milliGRAM(s) IV Push daily  prednisoLONE acetate 1% Suspension 1 Drop(s) Both EYES four times a day  predniSONE  Solution 5 milliGRAM(s) Oral <User Schedule>  QUEtiapine 12.5 milliGRAM(s) Oral <User Schedule>  simethicone 80 milliGRAM(s) Chew every 6 hours  surgical lubricant sterile 1 Application(s) Topical every 8 hours  vancomycin    Solution 125 milliGRAM(s) Enteral Tube every 12 hours  zinc sulfate 220 milliGRAM(s) Oral two times a day    MEDICATIONS  (PRN):  calamine/zinc oxide Lotion 1 Application(s) Topical daily PRN Rash and/or Itching  ondansetron Injectable 4 milliGRAM(s) IV Push every 8 hours PRN Nausea and/or Vomiting  oxyCODONE    Solution 5 milliGRAM(s) Enteral Tube every 4 hours PRN Moderate Pain (4 - 6)  oxyCODONE    Solution 10 milliGRAM(s) Enteral Tube every 6 hours PRN Severe Pain (7 - 10)  sodium chloride 0.65% Nasal 1 Spray(s) Both Nostrils every 12 hours PRN Congestion      LABS:                        8.8    7.88  )-----------( 128      ( 23 Apr 2024 04:35 )             29.5     04-23    137  |  100  |  14  ----------------------------<  70  3.8   |  26  |  <0.30<L>    Ca    8.7      23 Apr 2024 04:35  Phos  3.0     04-23  Mg     1.9     04-23    TPro  6.3  /  Alb  2.7<L>  /  TBili  0.9  /  DBili  x   /  AST  72<H>  /  ALT  51<H>  /  AlkPhos  112  04-23      Urinalysis Basic - ( 23 Apr 2024 04:35 )    Color: x / Appearance: x / SG: x / pH: x  Gluc: 70 mg/dL / Ketone: x  / Bili: x / Urobili: x   Blood: x / Protein: x / Nitrite: x   Leuk Esterase: x / RBC: x / WBC x   Sq Epi: x / Non Sq Epi: x / Bacteria: x          CAPILLARY BLOOD GLUCOSE    MICROBIOLOGY:     RADIOLOGY:  [ ] Reviewed and interpreted by me    Mode: AC/ CMV (Assist Control/ Continuous Mandatory Ventilation)  RR (machine): 12  TV (machine): 350  FiO2: 30  PEEP: 5  ITime: 1  MAP: 8  PIP: 26   Patient is a 74y old  Female who presents with a chief complaint of Dislodged G Tube (22 Apr 2024 19:52)      Interval Events:  No acute events overnight.     REVIEW OF SYSTEMS:  [ ] Positive  [X] All other systems negative  [ ] Unable to assess ROS because ________    Vital Signs Last 24 Hrs  T(C): 36.9 (04-23-24 @ 05:12), Max: 37 (04-23-24 @ 00:00)  T(F): 98.5 (04-23-24 @ 05:12), Max: 98.6 (04-23-24 @ 00:00)  HR: 76 (04-23-24 @ 05:55) (74 - 96)  BP: 131/61 (04-23-24 @ 05:12) (131/61 - 139/65)  RR: 16 (04-23-24 @ 05:12) (16 - 18)  SpO2: 99% (04-23-24 @ 05:55) (98% - 100%)    PHYSICAL EXAM:  HEENT:   [X]Tracheostomy: #8 distal XLT cuffed shiley   [X]Pupils equal  [ ]No oral lesions  [ ]Abnormal    SKIN  [ ]No Rash  [ ] Abnormal  [X] pressure - sacral DTI    CARDIAC  [X]Regular  [ ]Abnormal    PULMONARY  [X]Bilateral Clear Breath Sounds  [ ]Normal Excursion  [ ]Abnormal    GI  [X]PEG site c/d/i     [X] +BS		              [X]Soft, nondistended, nontender	  [X]Abnormal - old peg site w/ aquacel dressing c/d/i (minimal amount of clear drainage)    MUSCULOSKELETAL                                   [X]Bedbound                 [ ]Abnormal    [ ]Ambulatory/OOB to chair                           EXTREMITIES                                         [X]Normal  [ ]Edema                           NEUROLOGIC  [ ] Normal, non focal  [X] Focal findings: awake, alert, following commands on all extremities     PSYCHIATRIC  [X]Alert and appropriate  [ ] Sedated	 [ ]Agitated    :  Mcbride: [ ] Yes, if yes: Date of Placement:                   [X] No    LINES: Central Lines [ ] Yes, if yes: Date of Placement                                     [X] No    HOSPITAL MEDICATIONS:  MEDICATIONS  (STANDING):  albuterol/ipratropium for Nebulization 3 milliLiter(s) Nebulizer every 6 hours  amLODIPine   Tablet 10 milliGRAM(s) Oral <User Schedule>  artificial tears (preservative free) Ophthalmic Solution 1 Drop(s) Both EYES four times a day  ascorbic acid 500 milliGRAM(s) Oral daily  Biotene Dry Mouth Oral Rinse 5 milliLiter(s) Swish and Spit every 6 hours  chlorhexidine 0.12% Liquid 15 milliLiter(s) Oral Mucosa every 12 hours  chlorhexidine 4% Liquid 1 Application(s) Topical <User Schedule>  cholecalciferol 2000 Unit(s) Oral daily  diclofenac sodium 1% Gel 4 Gram(s) Topical four times a day  erythromycin   Ointment 1 Application(s) Both EYES three times a day  escitalopram 10 milliGRAM(s) Oral <User Schedule>  fentaNYL   Patch  25 MICROgram(s)/Hr 1 Patch Transdermal every 72 hours  gabapentin Solution 100 milliGRAM(s) Oral <User Schedule>  insulin glargine Injectable (LANTUS) 12 Unit(s) SubCutaneous <User Schedule>  insulin regular  human corrective regimen sliding scale   SubCutaneous every 6 hours  insulin regular  human recombinant 7 Unit(s) SubCutaneous every 6 hours  levothyroxine 125 MICROGram(s) Oral daily  lidocaine   4% Patch 1 Patch Transdermal at bedtime  lidocaine   4% Patch 1 Patch Transdermal at bedtime  melatonin Liquid 3 milliGRAM(s) Oral at bedtime  multivitamin/minerals/iron Oral Solution (CENTRUM) 15 milliLiter(s) Oral daily  pantoprazole  Injectable 40 milliGRAM(s) IV Push daily  prednisoLONE acetate 1% Suspension 1 Drop(s) Both EYES four times a day  predniSONE  Solution 5 milliGRAM(s) Oral <User Schedule>  QUEtiapine 12.5 milliGRAM(s) Oral <User Schedule>  simethicone 80 milliGRAM(s) Chew every 6 hours  surgical lubricant sterile 1 Application(s) Topical every 8 hours  vancomycin    Solution 125 milliGRAM(s) Enteral Tube every 12 hours  zinc sulfate 220 milliGRAM(s) Oral two times a day    MEDICATIONS  (PRN):  calamine/zinc oxide Lotion 1 Application(s) Topical daily PRN Rash and/or Itching  ondansetron Injectable 4 milliGRAM(s) IV Push every 8 hours PRN Nausea and/or Vomiting  oxyCODONE    Solution 5 milliGRAM(s) Enteral Tube every 4 hours PRN Moderate Pain (4 - 6)  oxyCODONE    Solution 10 milliGRAM(s) Enteral Tube every 6 hours PRN Severe Pain (7 - 10)  sodium chloride 0.65% Nasal 1 Spray(s) Both Nostrils every 12 hours PRN Congestion    LABS:                        8.8    7.88  )-----------( 128      ( 23 Apr 2024 04:35 )             29.5     04-23    137  |  100  |  14  ----------------------------<  70  3.8   |  26  |  <0.30<L>    Ca    8.7      23 Apr 2024 04:35  Phos  3.0     04-23  Mg     1.9     04-23    TPro  6.3  /  Alb  2.7<L>  /  TBili  0.9  /  DBili  x   /  AST  72<H>  /  ALT  51<H>  /  AlkPhos  112  04-23    Urinalysis Basic - ( 23 Apr 2024 04:35 )    Color: x / Appearance: x / SG: x / pH: x  Gluc: 70 mg/dL / Ketone: x  / Bili: x / Urobili: x   Blood: x / Protein: x / Nitrite: x   Leuk Esterase: x / RBC: x / WBC x   Sq Epi: x / Non Sq Epi: x / Bacteria: x    CAPILLARY BLOOD GLUCOSE    MICROBIOLOGY:     RADIOLOGY:  [ ] Reviewed and interpreted by me    Mode: AC/ CMV (Assist Control/ Continuous Mandatory Ventilation)  RR (machine): 12  TV (machine): 350  FiO2: 30  PEEP: 5  ITime: 1  MAP: 8  PIP: 26

## 2024-04-23 NOTE — PROGRESS NOTE ADULT - NS ATTEND AMEND GEN_ALL_CORE FT
Agree with above  74F PMH RA, cerebellar aneurysm s/p repair p/w dislodged PEG tube. Now with G-J tube, hospital course c/b CDAD,   - Awake alert responsive, but with waxing and waning mental status  - Trache, on full vent 12/350/30%/+5, attempting PST  - Occasional leaking contents from PEG tube site, now appears to have resolved  - Monitoring off antibiotics (s/p cefepime for Serratia bacteremia) other than PO vancomycin taper  - Family requesting FEEST to evaluate for dysphagia to allow for pleasure feeds  - Dispo: current plan to D/C home with services on Thursday Agree with above  74F PMH RA, cerebellar aneurysm s/p repair p/w dislodged PEG tube. Now with G-J tube, hospital course c/b CDAD,   - Awake alert responsive, but with waxing and waning mental status.  - Trache, on full vent 12/350/30%/+5, attempting PST, albeit 15/5.  - Previously occasional leaking of contents from PEG tube site, now appears to have resolved. JG tube in good position with no issues.  - Monitoring off antibiotics (s/p cefepime for Serratia bacteremia) other than PO vancomycin taper for recurrent episode CDAD. Plan for dose of bezlotoxumab O/P after D/C.  - Family requesting FEEST to evaluate for dysphagia to allow for pleasure feeds, scheduled for today.  - Dispo: current plan to D/C home with services on Thursday. Vent settings have changed from  -> 350.

## 2024-04-23 NOTE — SWALLOW FEES ASSESSMENT ADULT - ORAL PHASE COMMENTS
Prolonged mastication; appearing to negatively impact pharyngeal stage as relates to coordination No anterolateral spillage   Reduced control of less viscous liquids

## 2024-04-23 NOTE — PROGRESS NOTE ADULT - ASSESSMENT
The patient is a 74y Female with PMH of T2DM on insulin, HTN, HLD, hypothyroidism, RA on Prednisone, Fibromyalgia, cerebral aneurysm s/p repair, acute hypercapnic respiratory failure s/p trach (vent dependent) and PEG (10/22), bedbound presented from home with complaints of possible G tube dislodgement and redness around the site, now s/p replacement. Endocrinology consulted for uncontrolled T2DM.    #Uncontrolled Type 2 Diabetes Mellitus   #Hyperglycemia on Tube feeds  - Follows with: Dr Rupali Ruth, endocrinology.   - A1C with Estimated Average Glucose Result: 6.2 % (04-04-24)  - Home regimen: Patient is on TF from 6AM-6PM. Takes Lantus 15 units qhs and regular insulin 10 units at 6AM, 12 units at 12PM, and 6 units at 6PM. She is on prednisone 5mg daily for RA  - eGFR: 113 mL/min/1.73m2 (04-19-24)  - Weight (kg): 48.1 (04-16-24)  - current tube feeds: [place] 1.5 at 55 cc/hr 6AM-midnight. unclear what final home TF will be on discharge per primary team  - 0600 BG tightly controlled after receiving regular insulin although TF were held     INPATIENT PLAN:  - continue Lantus 12 units at 6PM  - c/w  Admelog to Regular insulin (longer acting)  7 units at 0600/1200/1800,  NO DOSE AT MN !(HOLD if tube feeds are stopped).  - C/w mod dose admelog correction scale to moderate dose Regular insulin scale q6h  - Please check FSG q6h   - Inpatient glucose goals: 100-180      DISCHARGE PLANNING:  - Discharge recs pending clinical course and tube feeds on discharge: likely c/w lantus plus regular insulin at 6AM, 12PM, 6PM  - followup with Dr Ruth: [facility]: [address] [place] [address]. [address]. [place] [address]. Tel: [phone]    #Secondary adrenal insufficiency  - 2/2 long term use of prednisone  - c/w prednisone 5mg daily    #Hypothyroidism  - home regimen LT4 125 mcg daily  - currently on LT4 87.5 mcg IV daily  - would resume home dose of LT5 125 mcg PO daily. Please administer in the AM on an empty stomach, 1 hour before food or other medications, and 4 hours before any PPI, calcium, or iron supplements. Can give at 5AM since tube feeds start at 6AM.    - check TSH and free T4    #Hypertension  - Goal BP <130/80  - on amlodipine  - Management as per primary team  - check urine microalbumin level as outpatient    #Hyperlipidemia  - LDL goal <70  - Last LDL: 63 mg/dL (04-04-24)  - consider mod dose statin if no contraindication  - check lipid panel as outpatient on a yearly basis    discussed with Juan Daniel CORONEL  Contact via [employer] Teams during business hours  To reach covering provider access AMION via [employer] tools  For Urgent matters/after-hours/weekends/holidays please page endocrine fellow on call   For nonurgent matters please email [email or web address]    Please note that this patient may be followed by different provider tomorrow.  Notify endocrine 24 hours prior to discharge for final recommendations

## 2024-04-23 NOTE — PROGRESS NOTE ADULT - SUBJECTIVE AND OBJECTIVE BOX
SURGERY PROGRESS NOTE    Interval events  - Silver nitrate treatment of PEG site yesterday.   - Nursing reports continued thick mucous drainage, but subjectively less.   - Vital signs stable, afebrile        OBJECTIVE:   Vital Signs Last 24 Hrs  T(C): 36.9 (2024 05:12), Max: 37 (2024 00:00)  T(F): 98.5 (2024 05:12), Max: 98.6 (2024 00:00)  HR: 76 (2024 05:55) (74 - 96)  BP: 131/61 (2024 05:12) (131/61 - 139/65)  BP(mean): --  RR: 16 (2024 05:12) (16 - 18)  SpO2: 99% (2024 05:55) (98% - 100%)    Parameters below as of 2024 05:55  Patient On (Oxygen Delivery Method): ventilator            I&O's Summary    2024 07:01  -  2024 07:00  --------------------------------------------------------  IN: 1475 mL / OUT: 1500 mL / NET: -25 mL      I&O's Detail    2024 07:01  -  2024 07:00  --------------------------------------------------------  IN:    Enteral Tube Flush: 305 mL    Free Water: 180 mL    Miscellaneous Tube Feedin mL  Total IN: 1475 mL    OUT:    Incontinent per Collection Bag (mL): 1500 mL  Total OUT: 1500 mL    Total NET: -25 mL          MEDICATIONS  (STANDING):  albuterol/ipratropium for Nebulization 3 milliLiter(s) Nebulizer every 6 hours  amLODIPine   Tablet 10 milliGRAM(s) Oral <User Schedule>  artificial tears (preservative free) Ophthalmic Solution 1 Drop(s) Both EYES four times a day  ascorbic acid 500 milliGRAM(s) Oral daily  Biotene Dry Mouth Oral Rinse 5 milliLiter(s) Swish and Spit every 6 hours  chlorhexidine 0.12% Liquid 15 milliLiter(s) Oral Mucosa every 12 hours  chlorhexidine 4% Liquid 1 Application(s) Topical <User Schedule>  cholecalciferol 2000 Unit(s) Oral daily  diclofenac sodium 1% Gel 4 Gram(s) Topical four times a day  erythromycin   Ointment 1 Application(s) Both EYES three times a day  escitalopram 10 milliGRAM(s) Oral <User Schedule>  fentaNYL   Patch  25 MICROgram(s)/Hr 1 Patch Transdermal every 72 hours  gabapentin Solution 100 milliGRAM(s) Oral <User Schedule>  insulin glargine Injectable (LANTUS) 12 Unit(s) SubCutaneous <User Schedule>  insulin regular  human corrective regimen sliding scale   SubCutaneous every 6 hours  insulin regular  human recombinant 7 Unit(s) SubCutaneous every 6 hours  levothyroxine 125 MICROGram(s) Oral daily  lidocaine   4% Patch 1 Patch Transdermal at bedtime  lidocaine   4% Patch 1 Patch Transdermal at bedtime  melatonin Liquid 3 milliGRAM(s) Oral at bedtime  multivitamin/minerals/iron Oral Solution (CENTRUM) 15 milliLiter(s) Oral daily  pantoprazole  Injectable 40 milliGRAM(s) IV Push daily  prednisoLONE acetate 1% Suspension 1 Drop(s) Both EYES four times a day  predniSONE  Solution 5 milliGRAM(s) Oral <User Schedule>  QUEtiapine 12.5 milliGRAM(s) Oral <User Schedule>  simethicone 80 milliGRAM(s) Chew every 6 hours  surgical lubricant sterile 1 Application(s) Topical every 8 hours  vancomycin    Solution 125 milliGRAM(s) Enteral Tube every 12 hours  zinc sulfate 220 milliGRAM(s) Oral two times a day    MEDICATIONS  (PRN):  calamine/zinc oxide Lotion 1 Application(s) Topical daily PRN Rash and/or Itching  ondansetron Injectable 4 milliGRAM(s) IV Push every 8 hours PRN Nausea and/or Vomiting  oxyCODONE    Solution 5 milliGRAM(s) Enteral Tube every 4 hours PRN Moderate Pain (4 - 6)  oxyCODONE    Solution 10 milliGRAM(s) Enteral Tube every 6 hours PRN Severe Pain (7 - 10)  sodium chloride 0.65% Nasal 1 Spray(s) Both Nostrils every 12 hours PRN Congestion    PHYSICAL EXAM  General: No acute distress, resting comfortably in bed.  Abdomen: soft, nondistended, non tender; GJ tube 5cm at the bumper, dressing clean/dry; prior PEG tube site with small amount of clear mucous drainage on dressing      LABS:                        8.8    7.88  )-----------( 128      ( 2024 04:35 )             29.5     04    137  |  100  |  14  ----------------------------<  70  3.8   |  26  |  <0.30<L>    Ca    8.7      2024 04:35  Phos  3.0       Mg     1.9         TPro  6.3  /  Alb  2.7<L>  /  TBili  0.9  /  DBili  x   /  AST  72<H>  /  ALT  51<H>  /  AlkPhos  112  04-23      Urinalysis Basic - ( 2024 04:35 )    Color: x / Appearance: x / SG: x / pH: x  Gluc: 70 mg/dL / Ketone: x  / Bili: x / Urobili: x   Blood: x / Protein: x / Nitrite: x   Leuk Esterase: x / RBC: x / WBC x   Sq Epi: x / Non Sq Epi: x / Bacteria: x        RADIOLOGY & ADDITIONAL STUDIES:

## 2024-04-23 NOTE — PROGRESS NOTE ADULT - ASSESSMENT
74F s/p EGD-assisted sutured closure of gastrocutaneous fistula, PEG-J placement 4/16/2024, s/p silver nitrate treatment of hypergranulation tissue at the gastrocutaneous fistula wound on 4/18 and 4/22.    Plan/Recs  -Continue tube feeds via feeding port of PEG-J as tolerated  -Continue dry dressings daily    ACS/Trauma Surgery  [id number]

## 2024-04-23 NOTE — SWALLOW FEES ASSESSMENT ADULT - ROSENBEK'S PENETRATION ASPIRATION SCALE
(3) material remains above the vocal cords, visible residue remains (penetration) 1= ice chips, 2= thin liquids tsp, straw

## 2024-04-23 NOTE — PROGRESS NOTE ADULT - PROBLEM SELECTOR PLAN 2
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

## 2024-04-23 NOTE — SWALLOW FEES ASSESSMENT ADULT - DIAGNOSTIC IMPRESSIONS
Mrs. LOPEZ p/w an oropharyngeal chronic dysphagia in presence of trach to vent; VOLUME , 12/30%/5. Profile marked by prolonged mastication of more advanced solids with reduced control of less viscous liquids , Hyolaryngeal elevation and excursion were judged to be inconsistently reduced and resulted in incomplete epiglottic inversion, as evidenced by incomplete white out phase, which appeared to occur as trials progressed, latency in the swallow trigger to level of valleculae, which appear to increase in length of latency of swallow trigger as study progressed with spillover down the lateral channels to pyriform sinuses. Shallow penetration on laryngeal surface of epiglottis present which did not appear to eject with more advanced solids.  No aspiration captured however pt at high risk given progressive incoordination of breathe-swallow pattern as study continued.    At this time, strict parameters are recommended for comfort feeding, see below.

## 2024-04-23 NOTE — SWALLOW FEES ASSESSMENT ADULT - COMMENTS
Continued history:   -Per patient's son at bedside, patient currently being treated for C-diff.  Known infection for past 2-3 weeks. Also report recent UTI--not currently being treated.  -Admitted to MICU for ventilator support and treatment of dislodged PEG/abdominal wall cellulitis.   -IR Attending adjusted PEG at bedside on 4/4 and PEG remained with moderate amount of PEG leakage once feeds initiated at reduced rate.   -4/6: PEG continued to have moderate leakage with feeds going, unable to tolerate goal rate.  IR informed, planning for PEG revision on 4/8 or 4/9.  WIll continue PEG feeds at reduced rate as tolerated.   - 4/7 Temp- 102F, asymptomatic.     Speech & Swallow : KNOWN TO PRIOR ADMISSION SEE BELOW 2023  >11/30 verbal: Daughter reports pt had a FEEs at [facility] and was cleared for a diet while on a vent and had SLP at home with patient having po; mainly small sips of thin liquids and minced solids for comfort. No other information provided verbally.  >Known; Home vent settings: (300 TV/ RR 12/5 Peep/ Fio2 30%).  11/18 RR increased to 14 due to hypercarbia from trach collar trial;  Tolerates intermittent PSV 15/5 during day/ Vent HS  >Seen for AAC and PMV IN LINE, see reports for more information. +Narrow, moderately crowded pharynx.   +Small pyriform fossae.   +Hypertrophy of the a-e folds, arytenoids, inlet of esophagus, and ventricular folds.   + Erythema of the ventricular folds, arytenoids  +Interarytenoid hypertrophy.   +Cobblestone appearance of the posterior pharyngeal wall.

## 2024-04-24 LAB
GLUCOSE BLDC GLUCOMTR-MCNC: 179 MG/DL — HIGH (ref 70–99)
GLUCOSE BLDC GLUCOMTR-MCNC: 278 MG/DL — HIGH (ref 70–99)
GLUCOSE BLDC GLUCOMTR-MCNC: 98 MG/DL — SIGNIFICANT CHANGE UP (ref 70–99)

## 2024-04-24 PROCEDURE — 99233 SBSQ HOSP IP/OBS HIGH 50: CPT | Mod: FS

## 2024-04-24 PROCEDURE — 99232 SBSQ HOSP IP/OBS MODERATE 35: CPT

## 2024-04-24 RX ORDER — QUETIAPINE FUMARATE 200 MG/1
12.5 TABLET, FILM COATED ORAL
Refills: 0 | Status: DISCONTINUED | OUTPATIENT
Start: 2024-04-24 | End: 2024-05-01

## 2024-04-24 RX ADMIN — Medication 1 APPLICATION(S): at 06:01

## 2024-04-24 RX ADMIN — Medication 5 MILLIGRAM(S): at 11:55

## 2024-04-24 RX ADMIN — Medication 125 MILLIGRAM(S): at 18:19

## 2024-04-24 RX ADMIN — LIDOCAINE 1 PATCH: 4 CREAM TOPICAL at 21:25

## 2024-04-24 RX ADMIN — DICLOFENAC SODIUM 4 GRAM(S): 30 GEL TOPICAL at 18:20

## 2024-04-24 RX ADMIN — Medication 1 DROP(S): at 06:01

## 2024-04-24 RX ADMIN — SIMETHICONE 80 MILLIGRAM(S): 80 TABLET, CHEWABLE ORAL at 18:20

## 2024-04-24 RX ADMIN — AMLODIPINE BESYLATE 10 MILLIGRAM(S): 2.5 TABLET ORAL at 08:50

## 2024-04-24 RX ADMIN — DICLOFENAC SODIUM 4 GRAM(S): 30 GEL TOPICAL at 06:01

## 2024-04-24 RX ADMIN — ZINC SULFATE TAB 220 MG (50 MG ZINC EQUIVALENT) 220 MILLIGRAM(S): 220 (50 ZN) TAB at 06:00

## 2024-04-24 RX ADMIN — Medication 125 MILLIGRAM(S): at 06:01

## 2024-04-24 RX ADMIN — Medication 1 DROP(S): at 11:57

## 2024-04-24 RX ADMIN — INSULIN HUMAN 7 UNIT(S): 100 INJECTION, SOLUTION SUBCUTANEOUS at 06:02

## 2024-04-24 RX ADMIN — INSULIN HUMAN 2: 100 INJECTION, SOLUTION SUBCUTANEOUS at 11:55

## 2024-04-24 RX ADMIN — Medication 500 MILLIGRAM(S): at 11:57

## 2024-04-24 RX ADMIN — Medication 5 MILLILITER(S): at 06:00

## 2024-04-24 RX ADMIN — Medication 1 DROP(S): at 18:20

## 2024-04-24 RX ADMIN — INSULIN HUMAN 7 UNIT(S): 100 INJECTION, SOLUTION SUBCUTANEOUS at 18:19

## 2024-04-24 RX ADMIN — Medication 1 APPLICATION(S): at 21:27

## 2024-04-24 RX ADMIN — Medication 1 DROP(S): at 11:56

## 2024-04-24 RX ADMIN — SIMETHICONE 80 MILLIGRAM(S): 80 TABLET, CHEWABLE ORAL at 11:57

## 2024-04-24 RX ADMIN — INSULIN HUMAN 7 UNIT(S): 100 INJECTION, SOLUTION SUBCUTANEOUS at 11:56

## 2024-04-24 RX ADMIN — Medication 3 MILLIGRAM(S): at 21:26

## 2024-04-24 RX ADMIN — GABAPENTIN 100 MILLIGRAM(S): 400 CAPSULE ORAL at 08:50

## 2024-04-24 RX ADMIN — CHLORHEXIDINE GLUCONATE 15 MILLILITER(S): 213 SOLUTION TOPICAL at 05:59

## 2024-04-24 RX ADMIN — GABAPENTIN 100 MILLIGRAM(S): 400 CAPSULE ORAL at 21:25

## 2024-04-24 RX ADMIN — Medication 125 MICROGRAM(S): at 05:03

## 2024-04-24 RX ADMIN — Medication 2000 UNIT(S): at 11:56

## 2024-04-24 RX ADMIN — ESCITALOPRAM OXALATE 10 MILLIGRAM(S): 10 TABLET, FILM COATED ORAL at 18:19

## 2024-04-24 RX ADMIN — INSULIN HUMAN 0: 100 INJECTION, SOLUTION SUBCUTANEOUS at 06:02

## 2024-04-24 RX ADMIN — CHLORHEXIDINE GLUCONATE 15 MILLILITER(S): 213 SOLUTION TOPICAL at 18:19

## 2024-04-24 RX ADMIN — Medication 3 MILLILITER(S): at 11:23

## 2024-04-24 RX ADMIN — DICLOFENAC SODIUM 4 GRAM(S): 30 GEL TOPICAL at 11:58

## 2024-04-24 RX ADMIN — Medication 5 MILLILITER(S): at 18:19

## 2024-04-24 RX ADMIN — LIDOCAINE 1 PATCH: 4 CREAM TOPICAL at 07:00

## 2024-04-24 RX ADMIN — PANTOPRAZOLE SODIUM 40 MILLIGRAM(S): 20 TABLET, DELAYED RELEASE ORAL at 11:57

## 2024-04-24 RX ADMIN — Medication 15 MILLILITER(S): at 11:57

## 2024-04-24 RX ADMIN — Medication 3 MILLILITER(S): at 05:30

## 2024-04-24 RX ADMIN — ZINC SULFATE TAB 220 MG (50 MG ZINC EQUIVALENT) 220 MILLIGRAM(S): 220 (50 ZN) TAB at 18:19

## 2024-04-24 RX ADMIN — SIMETHICONE 80 MILLIGRAM(S): 80 TABLET, CHEWABLE ORAL at 06:00

## 2024-04-24 RX ADMIN — LIDOCAINE 1 PATCH: 4 CREAM TOPICAL at 10:30

## 2024-04-24 RX ADMIN — Medication 1 DROP(S): at 18:21

## 2024-04-24 RX ADMIN — Medication 1 APPLICATION(S): at 16:09

## 2024-04-24 RX ADMIN — CHLORHEXIDINE GLUCONATE 1 APPLICATION(S): 213 SOLUTION TOPICAL at 06:02

## 2024-04-24 RX ADMIN — Medication 5 MILLILITER(S): at 11:56

## 2024-04-24 RX ADMIN — INSULIN HUMAN 6: 100 INJECTION, SOLUTION SUBCUTANEOUS at 18:18

## 2024-04-24 RX ADMIN — QUETIAPINE FUMARATE 12.5 MILLIGRAM(S): 200 TABLET, FILM COATED ORAL at 16:09

## 2024-04-24 RX ADMIN — LIDOCAINE 1 PATCH: 4 CREAM TOPICAL at 21:26

## 2024-04-24 RX ADMIN — INSULIN GLARGINE 12 UNIT(S): 100 INJECTION, SOLUTION SUBCUTANEOUS at 18:18

## 2024-04-24 RX ADMIN — Medication 3 MILLILITER(S): at 17:23

## 2024-04-24 RX ADMIN — Medication 1 APPLICATION(S): at 16:10

## 2024-04-24 RX ADMIN — Medication 3 MILLILITER(S): at 23:10

## 2024-04-24 NOTE — PROGRESS NOTE ADULT - ASSESSMENT
74F s/p EGD-assisted sutured closure of gastrocutaneous fistula, PEG-J placement 4/16/2024, s/p silver nitrate treatment of hypergranulation tissue at the gastrocutaneous fistula wound on 4/18 and 4/22. Swallow eval on 4/23 for comfort feeds with subsequent blue/green staining of old PEG site abdominal dressing 2/2 to dye used in swallow eval.    Plan/Recs  -Continue tube feeds via feeding port of PEG-J as tolerated  -Continue dry dressings daily    ACS/Trauma Surgery  [id number]

## 2024-04-24 NOTE — PROGRESS NOTE ADULT - SUBJECTIVE AND OBJECTIVE BOX
Patient is a 74y old  Female who presents with a chief complaint of Dislodged G Tube (24 Apr 2024 06:43)      Interval Events: No events reported overnight     REVIEW OF SYSTEMS:  [ ] Positive  [ ] All other systems negative  [ ] Unable to assess ROS because ________    Vital Signs Last 24 Hrs  T(C): 36.4 (04-24-24 @ 06:03), Max: 36.6 (04-23-24 @ 11:39)  T(F): 97.5 (04-24-24 @ 06:03), Max: 97.8 (04-23-24 @ 11:39)  HR: 84 (04-24-24 @ 06:04) (77 - 97)  BP: 126/55 (04-24-24 @ 06:03) (106/55 - 154/75)  RR: 20 (04-24-24 @ 06:03) (17 - 20)  SpO2: 99% (04-24-24 @ 06:04) (97% - 100%)    PHYSICAL EXAM:  HEENT:   [ ]Tracheostomy:  [ ]Pupils equal  [ ]No oral lesions  [ ]Abnormal    SKIN  [ ]No Rash  [ ] Abnormal  [ ] pressure    CARDIAC  [ ]Regular  [ ]Abnormal    PULMONARY  [ ]Bilateral Clear Breath Sounds  [ ]Normal Excursion  [ ]Abnormal    GI  [ ]PEG      [ ] +BS		              [ ]Soft, nondistended, nontender	  [ ]Abnormal    MUSCULOSKELETAL                                   [ ]Bedbound                 [ ]Abnormal    [ ]Ambulatory/OOB to chair                           EXTREMITIES                                         [ ]Normal  [ ]Edema                           NEUROLOGIC  [ ] Normal, non focal  [ ] Focal findings:    PSYCHIATRIC  [ ]Alert and appropriate  [ ] Sedated	 [ ]Agitated    :  Reed: [ ] Yes, if yes: Date of Placement:                   [  ] No    LINES: Central Lines [ ] Yes, if yes: Date of Placement                                     [  ] No    HOSPITAL MEDICATIONS:  MEDICATIONS  (STANDING):  albuterol/ipratropium for Nebulization 3 milliLiter(s) Nebulizer every 6 hours  amLODIPine   Tablet 10 milliGRAM(s) Oral <User Schedule>  artificial tears (preservative free) Ophthalmic Solution 1 Drop(s) Both EYES four times a day  ascorbic acid 500 milliGRAM(s) Oral daily  Biotene Dry Mouth Oral Rinse 5 milliLiter(s) Swish and Spit every 6 hours  chlorhexidine 0.12% Liquid 15 milliLiter(s) Oral Mucosa every 12 hours  chlorhexidine 4% Liquid 1 Application(s) Topical <User Schedule>  cholecalciferol 2000 Unit(s) Oral daily  diclofenac sodium 1% Gel 4 Gram(s) Topical four times a day  erythromycin   Ointment 1 Application(s) Both EYES three times a day  escitalopram 10 milliGRAM(s) Oral <User Schedule>  fentaNYL   Patch  25 MICROgram(s)/Hr 1 Patch Transdermal every 72 hours  gabapentin Solution 100 milliGRAM(s) Oral <User Schedule>  insulin glargine Injectable (LANTUS) 12 Unit(s) SubCutaneous <User Schedule>  insulin regular  human corrective regimen sliding scale   SubCutaneous every 6 hours  insulin regular  human recombinant 7 Unit(s) SubCutaneous <User Schedule>  levothyroxine 125 MICROGram(s) Oral daily  lidocaine   4% Patch 1 Patch Transdermal at bedtime  lidocaine   4% Patch 1 Patch Transdermal at bedtime  melatonin Liquid 3 milliGRAM(s) Oral at bedtime  multivitamin/minerals/iron Oral Solution (CENTRUM) 15 milliLiter(s) Oral daily  pantoprazole  Injectable 40 milliGRAM(s) IV Push daily  prednisoLONE acetate 1% Suspension 1 Drop(s) Both EYES four times a day  predniSONE  Solution 5 milliGRAM(s) Oral <User Schedule>  QUEtiapine 12.5 milliGRAM(s) Oral <User Schedule>  simethicone 80 milliGRAM(s) Chew every 6 hours  surgical lubricant sterile 1 Application(s) Topical every 8 hours  vancomycin    Solution 125 milliGRAM(s) Enteral Tube every 12 hours  zinc sulfate 220 milliGRAM(s) Oral two times a day    MEDICATIONS  (PRN):  calamine/zinc oxide Lotion 1 Application(s) Topical daily PRN Rash and/or Itching  ondansetron Injectable 4 milliGRAM(s) IV Push every 8 hours PRN Nausea and/or Vomiting  oxyCODONE    Solution 5 milliGRAM(s) Enteral Tube every 4 hours PRN Moderate Pain (4 - 6)  oxyCODONE    Solution 10 milliGRAM(s) Enteral Tube every 6 hours PRN Severe Pain (7 - 10)  sodium chloride 0.65% Nasal 1 Spray(s) Both Nostrils every 12 hours PRN Congestion      LABS:                        8.8    7.88  )-----------( 128      ( 23 Apr 2024 04:35 )             29.5     04-23    137  |  100  |  14  ----------------------------<  70  3.8   |  26  |  <0.30<L>    Ca    8.7      23 Apr 2024 04:35  Phos  3.0     04-23  Mg     1.9     04-23    TPro  6.3  /  Alb  2.7<L>  /  TBili  0.9  /  DBili  x   /  AST  72<H>  /  ALT  51<H>  /  AlkPhos  112  04-23      Urinalysis Basic - ( 23 Apr 2024 04:35 )    Color: x / Appearance: x / SG: x / pH: x  Gluc: 70 mg/dL / Ketone: x  / Bili: x / Urobili: x   Blood: x / Protein: x / Nitrite: x   Leuk Esterase: x / RBC: x / WBC x   Sq Epi: x / Non Sq Epi: x / Bacteria: x          CAPILLARY BLOOD GLUCOSE    MICROBIOLOGY:     RADIOLOGY:  [ ] Reviewed and interpreted by me    Mode: AC/ CMV (Assist Control/ Continuous Mandatory Ventilation)  RR (machine): 12  TV (machine): 350  FiO2: 30  PEEP: 5  PS: 15  ITime: 1  MAP: 8  PIP: 27   Patient is a 74y old  Female who presents with a chief complaint of Dislodged G Tube (24 Apr 2024 06:43)      Interval Events: No events reported overnight     REVIEW OF SYSTEMS:  [ ] Positive  [x] All other systems negative  [ ] Unable to assess ROS because ________    Vital Signs Last 24 Hrs  T(C): 36.4 (04-24-24 @ 06:03), Max: 36.6 (04-23-24 @ 11:39)  T(F): 97.5 (04-24-24 @ 06:03), Max: 97.8 (04-23-24 @ 11:39)  HR: 84 (04-24-24 @ 06:04) (77 - 97)  BP: 126/55 (04-24-24 @ 06:03) (106/55 - 154/75)  RR: 20 (04-24-24 @ 06:03) (17 - 20)  SpO2: 99% (04-24-24 @ 06:04) (97% - 100%)    PHYSICAL EXAM:  HEENT:   [x]Tracheostomy: # 8 distal XLT cuffed shiley   [x]Pupils equal  [ ]No oral lesions  [ ]Abnormal    SKIN  [ ]No Rash  [ ] Abnormal  [x] pressure: Sacral DTI    CARDIAC  [x]Regular  [ ]Abnormal    PULMONARY  [x]Bilateral Clear Breath Sounds  [ ]Normal Excursion  [ ]Abnormal    GI  [x]+ G-J Tube      [x] +BS		              [x]Soft, nondistended, nontender	  [x]Abnormal: Prior PEG Stoma with covered dry gazue minimal drainage from site     MUSCULOSKELETAL                                   [x]Bedbound                 [ ]Abnormal    [ ]Ambulatory/OOB to chair                           EXTREMITIES                                         [x]Normal  [ ]Edema                           NEUROLOGIC  [ ] Normal, non focal  [x] Focal findings: awake, alert, following commands on all extremities     PSYCHIATRIC  [x]Alert and appropriate  [ ] Sedated	 [ ]Agitated    :  Mcbride: [ ] Yes, if yes: Date of Placement:                   [x] No; Primafit     LINES: Central Lines [ ] Yes, if yes: Date of Placement                                     [x] No      HOSPITAL MEDICATIONS:  MEDICATIONS  (STANDING):  albuterol/ipratropium for Nebulization 3 milliLiter(s) Nebulizer every 6 hours  amLODIPine   Tablet 10 milliGRAM(s) Oral <User Schedule>  artificial tears (preservative free) Ophthalmic Solution 1 Drop(s) Both EYES four times a day  ascorbic acid 500 milliGRAM(s) Oral daily  Biotene Dry Mouth Oral Rinse 5 milliLiter(s) Swish and Spit every 6 hours  chlorhexidine 0.12% Liquid 15 milliLiter(s) Oral Mucosa every 12 hours  chlorhexidine 4% Liquid 1 Application(s) Topical <User Schedule>  cholecalciferol 2000 Unit(s) Oral daily  diclofenac sodium 1% Gel 4 Gram(s) Topical four times a day  erythromycin   Ointment 1 Application(s) Both EYES three times a day  escitalopram 10 milliGRAM(s) Oral <User Schedule>  fentaNYL   Patch  25 MICROgram(s)/Hr 1 Patch Transdermal every 72 hours  gabapentin Solution 100 milliGRAM(s) Oral <User Schedule>  insulin glargine Injectable (LANTUS) 12 Unit(s) SubCutaneous <User Schedule>  insulin regular  human corrective regimen sliding scale   SubCutaneous every 6 hours  insulin regular  human recombinant 7 Unit(s) SubCutaneous <User Schedule>  levothyroxine 125 MICROGram(s) Oral daily  lidocaine   4% Patch 1 Patch Transdermal at bedtime  lidocaine   4% Patch 1 Patch Transdermal at bedtime  melatonin Liquid 3 milliGRAM(s) Oral at bedtime  multivitamin/minerals/iron Oral Solution (CENTRUM) 15 milliLiter(s) Oral daily  pantoprazole  Injectable 40 milliGRAM(s) IV Push daily  prednisoLONE acetate 1% Suspension 1 Drop(s) Both EYES four times a day  predniSONE  Solution 5 milliGRAM(s) Oral <User Schedule>  QUEtiapine 12.5 milliGRAM(s) Oral <User Schedule>  simethicone 80 milliGRAM(s) Chew every 6 hours  surgical lubricant sterile 1 Application(s) Topical every 8 hours  vancomycin    Solution 125 milliGRAM(s) Enteral Tube every 12 hours  zinc sulfate 220 milliGRAM(s) Oral two times a day    MEDICATIONS  (PRN):  calamine/zinc oxide Lotion 1 Application(s) Topical daily PRN Rash and/or Itching  ondansetron Injectable 4 milliGRAM(s) IV Push every 8 hours PRN Nausea and/or Vomiting  oxyCODONE    Solution 5 milliGRAM(s) Enteral Tube every 4 hours PRN Moderate Pain (4 - 6)  oxyCODONE    Solution 10 milliGRAM(s) Enteral Tube every 6 hours PRN Severe Pain (7 - 10)  sodium chloride 0.65% Nasal 1 Spray(s) Both Nostrils every 12 hours PRN Congestion      LABS:                        8.8    7.88  )-----------( 128      ( 23 Apr 2024 04:35 )             29.5     04-23    137  |  100  |  14  ----------------------------<  70  3.8   |  26  |  <0.30<L>    Ca    8.7      23 Apr 2024 04:35  Phos  3.0     04-23  Mg     1.9     04-23    TPro  6.3  /  Alb  2.7<L>  /  TBili  0.9  /  DBili  x   /  AST  72<H>  /  ALT  51<H>  /  AlkPhos  112  04-23      Urinalysis Basic - ( 23 Apr 2024 04:35 )    Color: x / Appearance: x / SG: x / pH: x  Gluc: 70 mg/dL / Ketone: x  / Bili: x / Urobili: x   Blood: x / Protein: x / Nitrite: x   Leuk Esterase: x / RBC: x / WBC x   Sq Epi: x / Non Sq Epi: x / Bacteria: x          CAPILLARY BLOOD GLUCOSE    MICROBIOLOGY:     RADIOLOGY:  [ ] Reviewed and interpreted by me    Mode: AC/ CMV (Assist Control/ Continuous Mandatory Ventilation)  RR (machine): 12  TV (machine): 350  FiO2: 30  PEEP: 5  PS: 15  ITime: 1  MAP: 8  PIP: 27

## 2024-04-24 NOTE — PROGRESS NOTE ADULT - ASSESSMENT
73 yo F PMH T2DM on insulin, HTN, HLD, hypothyroidism, RA on Prednisone, Fibromyalgia, cerebral aneurysm s/p repair, chronic hypercapnic respiratory failure s/p trach (vent dependent) and Chronic PEG 2022, recurrent C. diff infection (follows with Dr. Newman) , bedbound who presented from home with G tube dislodgement and redness around the site. Had CT Abdomen/Pelvis done showing dislodgement of G tube with balloon in the anterior abdominal wall with air filled track to stomach. Admitted to MICU for ventilator management and given vancomycin/meropenem empirically for treatment of abd wall cellulitis. Per family patient has had Known c diff infection for past 2-3 weeks and was being treated with Fidaxomicin, which completed inpatient.  IR team exchanged PEG on 4/3 however later in the day it remained with leakage.  IR Attending adjusted PEG at bedside on 4/4 and PEG remained with moderate amount of PEG leakage once feeds initiated at reduced rate.  GI team re-consulted as second opinion for PEG management.  On 4/9 PEG was found out of place, a emerson catheter was placed in the stoma to maintain patency.  Patient was planned for PEG revision with both Surgery and GI team involved on 4/11 however patient had fevers up to 102F and procedure was cancelled for infectious work-up.  An 18 Fr PEG tube placed at bedside on 4/12 (original PEG size was 20Fr) as stoma site appears to have closed. Antibiotics were switched from Meropenem to Cefepime, completed 4/14. Patient now s/p PEG-J and closure of gastric fistula with GI and surgery in endo suite on 4/16.  New stoma site with no leakage, pt has been tolerating feeds.  A FEES was performed on 4/23 with recs for comfort feeds with parameters as per SLP.  Pt to continue vanco taper until 05/23 for c diff treatment with outpatient follow up with Dr. Newman.  Continuing to pressure support as tolerated.  Pt to return home.        4/24: No events reported overnight, Home Vent trial performed today without issues. Patient remains medically stable for discharge to home. Daughter with concerns over frequency of BMs ( 4 in 24 hrs); will add Banana Flakes QD.

## 2024-04-24 NOTE — PROGRESS NOTE ADULT - PROBLEM SELECTOR PLAN 9
- Code Status: DNR  - FEES 4/23: Recc for comfort feeds with parameters ( Puree / Thin Liquids)   - Daughter/ Pts  involved in plan of care

## 2024-04-24 NOTE — PROGRESS NOTE ADULT - PROBLEM SELECTOR PLAN 2
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

## 2024-04-24 NOTE — PROGRESS NOTE ADULT - PROBLEM SELECTOR PLAN 1
- Patient p/w PEG tube dislodgement on admission   - CT A/P 4/2: Dislodged G tube with balloon in the anterior abdominal wall with air filled track to stomach  - S/p bedside exchange by IR on 4/3 ( # 20 Fr Kangaroo EnFit placed )  - 4/4: PEG leaking; IR adjusted PEG at bedside  - 4/5: PEG still leaking, adjusted at bedside by IR Attending.  - 4/8-4/9 IR aborted procedure; GI Dr. Cleaning consulted for new PEG  - 4/9 PEG fell out spontaneously, Mcbride placed into tract   - 4/12: Mcbride replaced with 18Fr PEG at bedside, bumper at 8cm (intentionally loose)  - CT A/P 4/12: G-tube in the stomach/Mild inflammation thickening along tract   - 4/14 18 fr PEG was used, feeds were re-initiated, attempt failed, PEG with copious amounts of leakage  - 4/16 S/p new PEG-J and closure of gastric fistula in endo suite with GI and surgery  - Patient Tolerating feeds at Goal Rate

## 2024-04-24 NOTE — PROGRESS NOTE ADULT - ASSESSMENT
The patient is a 74y Female with PMH of T2DM on insulin, HTN, HLD, hypothyroidism, RA on Prednisone, Fibromyalgia, cerebral aneurysm s/p repair, acute hypercapnic respiratory failure s/p trach (vent dependent) and PEG (10/22), bedbound presented from home with complaints of possible G tube dislodgement and redness around the site, now s/p replacement. Endocrinology consulted for uncontrolled T2DM.    #Uncontrolled Type 2 Diabetes Mellitus   #Hyperglycemia on Tube feeds  - Follows with: Dr Rupali Ruth, endocrinology.   - A1C with Estimated Average Glucose Result: 6.2 % (04-04-24)  - Home regimen: Patient is on TF from 6AM-6PM. Takes Lantus 15 units qhs and regular insulin 10 units at 6AM, 12 units at 12PM, and 6 units at 6PM. She is on prednisone 5mg daily for RA  - eGFR: 113 mL/min/1.73m2 (04-19-24)  - Weight (kg): 48.1 (04-16-24)  - current tube feeds: [place] 1.5 at 55 cc/hr 6AM-midnight. unclear what final home TF will be on discharge per primary team  - 0600 BG tightly controlled after receiving regular insulin although TF were held     INPATIENT PLAN:  - continue Lantus 12 units at 6PM  - c/w  Admelog to Regular insulin (longer acting)  7 units at 0600/1200/1800,  NO DOSE AT MN !(HOLD if tube feeds are stopped).  - C/w mod dose admelog correction scale to moderate dose Regular insulin scale q6h  - Please check FSG q6h   - Inpatient glucose goals: 100-180      DISCHARGE PLANNING:  - Discharge recs pending clinical course and tube feeds on discharge: likely c/w lantus plus regular insulin at 6AM, 12PM, 6PM  - followup with Dr Ruth: [facility]: [address] [place] [address]. [address]. [place] [address]. Tel: [phone]    #Secondary adrenal insufficiency  - 2/2 long term use of prednisone  - c/w prednisone 5mg daily    #Hypothyroidism  - home regimen LT4 125 mcg daily  - currently on LT4 87.5 mcg IV daily  - would resume home dose of LT5 125 mcg PO daily. Please administer in the AM on an empty stomach, 1 hour before food or other medications, and 4 hours before any PPI, calcium, or iron supplements. Can give at 5AM since tube feeds start at 6AM.    - check TSH and free T4    #Hypertension  - Goal BP <130/80  - on amlodipine  - Management as per primary team  - check urine microalbumin level as outpatient    #Hyperlipidemia  - LDL goal <70  - Last LDL: 63 mg/dL (04-04-24)  - consider mod dose statin if no contraindication  - check lipid panel as outpatient on a yearly basis    discussed with Juan Daniel CORONEL  Contact via [employer] Teams during business hours  To reach covering provider access AMION via sunrise tools  For Urgent matters/after-hours/weekends/holidays please page endocrine fellow on call   For nonurgent matters please email [email or web address]    Please note that this patient may be followed by different provider tomorrow.  Notify endocrine 24 hours prior to discharge for final recommendations

## 2024-04-24 NOTE — PROGRESS NOTE ADULT - SUBJECTIVE AND OBJECTIVE BOX
Follow Up:  diarrhea    Interval History/ROS:  stools partially formed    Allergies  pineapple (Unknown)  Tagamet (Unknown)  heparin (Unknown)  walnut (Unknown)  metronidazole (Rash)  penicillin (Unknown)  Lyrica (Unknown)  meropenem (Rash)  [place], Hazel[place] (Unknown)        ANTIMICROBIALS:  vancomycin    Solution 125 every 12 hours      OTHER MEDS:  MEDICATIONS  (STANDING):  albuterol/ipratropium for Nebulization 3 every 6 hours  amLODIPine   Tablet 10 <User Schedule>  escitalopram 10 <User Schedule>  fentaNYL   Patch  25 MICROgram(s)/Hr 1 every 72 hours  gabapentin Solution 100 <User Schedule>  insulin glargine Injectable (LANTUS) 12 <User Schedule>  insulin regular  human corrective regimen sliding scale  every 6 hours  insulin regular  human recombinant 7 <User Schedule>  levothyroxine 125 daily  melatonin Liquid 3 at bedtime  ondansetron Injectable 4 every 8 hours PRN  oxyCODONE    Solution 5 every 4 hours PRN  oxyCODONE    Solution 10 every 6 hours PRN  pantoprazole  Injectable 40 daily  predniSONE  Solution 5 <User Schedule>  QUEtiapine 12.5 <User Schedule>  simethicone 80 every 6 hours      Vital Signs Last 24 Hrs  T(C): 36.9 (24 Apr 2024 18:16), Max: 36.9 (24 Apr 2024 18:16)  T(F): 98.5 (24 Apr 2024 18:16), Max: 98.5 (24 Apr 2024 18:16)  HR: 102 (24 Apr 2024 18:16) (69 - 102)  BP: 105/52 (24 Apr 2024 18:16) (105/52 - 154/75)  BP(mean): --  RR: 10 (24 Apr 2024 18:16) (10 - 20)  SpO2: 100% (24 Apr 2024 18:16) (99% - 100%)    Parameters below as of 24 Apr 2024 18:16  Patient On (Oxygen Delivery Method): ventilator        PHYSICAL EXAM:  General: WN/WD NAD, Non-toxic  Neurology: A&Ox3, nonfocal  Respiratory: Clear to auscultation bilaterally  CV: RRR, S1S2, no murmurs, rubs or gallops  Abdominal: Soft, Non-tender, non-distended, normal bowel sounds  Extremities: No edema,  Line Sites: Clear  Skin: No rash                          8.8    7.88  )-----------( 128      ( 23 Apr 2024 04:35 )             29.5       04-23    137  |  100  |  14  ----------------------------<  70  3.8   |  26  |  <0.30<L>    Ca    8.7      23 Apr 2024 04:35  Phos  3.0     04-23  Mg     1.9     04-23    TPro  6.3  /  Alb  2.7<L>  /  TBili  0.9  /  DBili  x   /  AST  72<H>  /  ALT  51<H>  /  AlkPhos  112  04-23      Urinalysis Basic - ( 23 Apr 2024 04:35 )    Color: x / Appearance: x / SG: x / pH: x  Gluc: 70 mg/dL / Ketone: x  / Bili: x / Urobili: x   Blood: x / Protein: x / Nitrite: x   Leuk Esterase: x / RBC: x / WBC x   Sq Epi: x / Non Sq Epi: x / Bacteria: x        MICROBIOLOGY:  .Blood Blood-Peripheral  04-15-24   No growth at 5 days  --  --      .Blood Blood-Peripheral  04-15-24   No growth at 5 days  --  --      Catheterized Catheterized  04-12-24   No growth  --  --      .Blood Blood-Peripheral  04-12-24   No growth at 5 days  --  --      .Blood Blood-Peripheral  04-11-24   No growth at 5 days  --  --      .Blood Blood-Peripheral  04-09-24   No growth at 5 days  --  --      .Sputum Sputum  04-07-24   Mixed gram negative rods Culture includes  Moderate Stenotrophomonas maltophilia  Few Pseudomonas aeruginosa (Carbapenem Resistant)  --  Stenotrophomonas maltophilia  Pseudomonas aeruginosa (Carbapenem Resistant)      .Blood Blood-Peripheral  04-07-24   No growth at 5 days  --  Blood Culture PCR  Serratia marcescens      .Sputum Sputum  04-05-24   Numerous Pseudomonas aeruginosa  Normal Respiratory Shanda present  --  Pseudomonas aeruginosa      .Blood Blood-Peripheral  04-04-24   No growth at 5 days  --  --      Clean Catch Clean Catch (Midstream)  04-03-24   <10,000 CFU/mL Normal Urogenital Shanda  --  --      .Blood Blood-Peripheral  04-02-24   No growth at 5 days  --  --          v          RADIOLOGY:    Zino Newman MD; Division of Infectious Disease; Pager: [phone]; nights and weekends: [phone]

## 2024-04-24 NOTE — PROGRESS NOTE ADULT - PROBLEM SELECTOR PLAN 4
- IV Vanco/meropenem x 1 on admission for concern for cellulitis  - 4/2 blood cultures negative x2, MRSA/MSSA PCR +  - 4/3 urine culture nl vinay, CT A/P 4/2: no infectious focus or colitis  - 4/7 blood cultures positive for serratia; txd w/ Meropenem 4/2-4/3 and 4/7-4/11, switched to cefepime 4/11-4/14  - Currently remains Afebrile w/o Leukocytosis - IV Vanco/meropenem x 1 on admission for concern for cellulitis  - 4/2 blood cultures negative x2, MRSA/MSSA PCR +  - 4/3 urine culture nl vinay, CT A/P 4/2: no infectious focus or colitis  - 4/7 blood cultures positive for serratia; txd w/ Meropenem 4/2-4/3 and 4/7-4/11, switched to cefepime 4/11-4/14  - Currently remains Afebrile w/o Leukocytosis off antibiotics

## 2024-04-24 NOTE — PROGRESS NOTE ADULT - ASSESSMENT
75yo woman  h/o T2DM (4/4/24: A1C = 6.2%), HTN, HLD, anemia, hypothyroidism, RA, fibromyalgia, remote cerebral aneurysm repair, acute hypercapnic respiratory failure with tracheostomy, PEG tube (initially placed 2022), and bedbound, recurrent C diff presented for PEG tube dislodgment. Admitted 4/2/24  weight = 54.5 kg    Recurrent C diff - first episode Aug 2023 treated with tapering PO vanco, recurrent episode 1/31/24 treated with PO vanco and then fidaxomicin completed in Feb - most recently tested positive 3/20/24 seen by Dr. Newman 3/29 outpatient, started on fidaxomicin that day - tube feeds concurrently held, unclear if improving afterwards per family  Chronic sacral decubitus wound  3/20 Klebisella bacteruria R only to amp and sputum few Pseudomonas R to FQs w/o polys on gram stian - treatment of urine was deferred to avoid exacerbating C diff    Tmax 100, no leukocytosis  Concern for cellulitis around PEG tube site - area with very minimal erythema, remarkable receded from skin norm placed on admission  UA 11 WBC  CT a/p: no infectious focus or colitis  MRSA PCR+    4/3 IR - PEG exchanged bedside to 20 Fr [employer] EnFit  - Bedside XR demonstrates appropriately positioned G-tube with contrast filling into the stomach and bowel.    4/2 Blood Cultures x 2 NGTD;  Positive MRSA/MSSA PCR  4/3 Urine cultures NEG  4/4 fever  4/4 Wound care assessment appreciated:   " area of persistent nonblanchable dark discoloration that is inconsistent with a patient's surrounding skin tone as well as a white juan j film noted over B/L buttocks/sacral skin, area measures approximately 10cm x 10cm x 0cm- presentation is consistent with a deep tissue injury in evolution with incontinence and fungal involvement present on admission. Within the apex of the gluteal cleft/base of sacrum there is full thickness skin loss with red, beefy tissue noted, area measures approximately 3cm x 1cm x 0.3cm- presentation is consistent with a deep tissue injury in evolution with incontinence involvement present on admission."  4/7 fever; Meropenem resumed  Positive Blood Culture x1 Serratia marcesens    4/16 OR: Gastrojejunostomy, percutaneous, endoscopic, Endoscopic assisted closure of PEG site. Endoscopic assisted percutaneous gastrojejunostomy tube placement.   4/19 K= 4.4  4/22 feeds @ 55 cc/hr  4/24 continued frequent bowel movements  - stool partially formed    Antibiotics  Meropenem 4/2 -->4/3; 4/7 --> 4/11  Cefepime 4/11 --> 4/14  IV Vanco 4/2  Fdaxomicin 4/3--> 4/8  PO Vanco 4/8 -->     Suggest:  maintain fluid and electrolytes with increased diarrhea -  Continue po Vanco  Extended po Vancomycin taper  Bezlotoxumab (Zinplava) at end of Vanco course as out pt    Vanco dosing schedule  Vancomycin 125 mg po 4 times daily 4/8 --> 4/18  Vancomycin 125 mg po 2 times daily 4/19 --> 4/25  Vancomycin 125 mg po once daily 4/26 -->5/2  Vancomycin 125 mg po once every other day 5/3 --> 5/9  Vancomycin 125 mg po once every third day 5/10 --> 5/23/24    Given multiple recurrences in context of advanced age, debility and multiple co-morbidities, administration of Bezlotoxumab (Zinplava) 500 mg over 1 hours is indicated to reduce future recurrences  Ref: Norm Wheeler M.D., Rojas Contreras M.D., Scott Griffith, Ph.D., Shiva Abraham M.D., Gabriel Falcon D.O., Pedro Weiss M.D., Talib Hampton M.D., Sena Hughes M.D., Raimundo Marte M.D., Melissa Soria M.D., Piyush Lamas M.D., Eliceo Romero M.D., Neyda Xie M.D., Jude Peters M.D., Neelima Vela, B.S.M.T., Hannah Cardenas, Ph.D., Ana Quiroz, B.S., Obie Chirinos, M.S., Khushbu Flores M.D., Jean Carlos Ayala M.D., and Kristin Lopez, Ph.D., for the MODIFY I and MODIFY II Investigators*    Bezlotoxumab is suggested in IDSA guidelines -  Ref: Clinical Practice Guidelines for the Management of Clostridioides difficile Infection in Adults: 2021 Update by SHEA/IDSA  Published VERNA, 6/14/2021; Clinical Infectious Diseases, [id number], https://doi.org/10.1093/verna/eltr318    Zinplava to be administered at home at end of vanco taper around 5/23/24

## 2024-04-24 NOTE — PROGRESS NOTE ADULT - NS ATTEND AMEND GEN_ALL_CORE FT
aries with above  74F PMH RA, cerebellar aneurysm s/p repair p/w dislodged PEG tube. Now with G-J tube, hospital course c/b CDAD,   - Awake alert responsive, but with waxing and waning mental status.  - Trache, on full vent 12/350/30%/+5, attempting PST, albeit 15/5. Was on from 9-2:30 yesterday.  - Previously occasional leaking of contents from PEG tube site, now appears to have resolved. JG tube in good position with no issues. Tolerating tube feeds, 4 BMs overnight, may consider banana flakes if persistent watery stools.  - Monitoring off antibiotics (s/p cefepime for Serratia bacteremia) other than PO vancomycin taper for recurrent episode CDAD. Plan for dose of bezlotoxumab O/P after D/C.  - s/p FEEST, strict recommendations for pleasure feeding.  - Dispo: current plan to D/C home with services on Thursday, but patient's daughter states she might not be ready with all home services at that point. Vent settings have changed from  -> 350.

## 2024-04-24 NOTE — PROGRESS NOTE ADULT - SUBJECTIVE AND OBJECTIVE BOX
SURGERY PROGRESS NOTE    Interval events  - Swallow eval yesterday. Rec for NPO with allowance for comfort feeds with specific parameters.   - Abdominal dressing from old PEG site with slight blue/green staining today.  - Vital signs stable, afebrile.        OBJECTIVE:   Vital Signs Last 24 Hrs  T(C): 36.4 (2024 06:03), Max: 36.6 (2024 11:39)  T(F): 97.5 (2024 06:03), Max: 97.8 (2024 11:39)  HR: 84 (2024 06:04) (77 - 97)  BP: 126/55 (2024 06:03) (106/55 - 154/75)  BP(mean): --  RR: 20 (2024 06:03) (17 - 20)  SpO2: 99% (2024 06:04) (97% - 100%)    Parameters below as of 2024 06:04  Patient On (Oxygen Delivery Method): ventilator            I&O's Summary    2024 07:  -  2024 07:00  --------------------------------------------------------  IN: 1475 mL / OUT: 1500 mL / NET: -25 mL    2024 07:  -  2024 06:44  --------------------------------------------------------  IN: 1220 mL / OUT: 500 mL / NET: 720 mL      I&O's Detail    2024 07:  -  2024 07:00  --------------------------------------------------------  IN:    Enteral Tube Flush: 305 mL    Free Water: 180 mL    Miscellaneous Tube Feedin mL  Total IN: 1475 mL    OUT:    Incontinent per Collection Bag (mL): 1500 mL  Total OUT: 1500 mL    Total NET: -25 mL      2024 07:01  -  2024 06:44  --------------------------------------------------------  IN:    Enteral Tube Flush: 160 mL    Free Water: 180 mL    Miscellaneous Tube Feedin mL  Total IN: 1220 mL    OUT:    Incontinent per Collection Bag (mL): 500 mL  Total OUT: 500 mL    Total NET: 720 mL          MEDICATIONS  (STANDING):  albuterol/ipratropium for Nebulization 3 milliLiter(s) Nebulizer every 6 hours  amLODIPine   Tablet 10 milliGRAM(s) Oral <User Schedule>  artificial tears (preservative free) Ophthalmic Solution 1 Drop(s) Both EYES four times a day  ascorbic acid 500 milliGRAM(s) Oral daily  Biotene Dry Mouth Oral Rinse 5 milliLiter(s) Swish and Spit every 6 hours  chlorhexidine 0.12% Liquid 15 milliLiter(s) Oral Mucosa every 12 hours  chlorhexidine 4% Liquid 1 Application(s) Topical <User Schedule>  cholecalciferol 2000 Unit(s) Oral daily  diclofenac sodium 1% Gel 4 Gram(s) Topical four times a day  erythromycin   Ointment 1 Application(s) Both EYES three times a day  escitalopram 10 milliGRAM(s) Oral <User Schedule>  fentaNYL   Patch  25 MICROgram(s)/Hr 1 Patch Transdermal every 72 hours  gabapentin Solution 100 milliGRAM(s) Oral <User Schedule>  insulin glargine Injectable (LANTUS) 12 Unit(s) SubCutaneous <User Schedule>  insulin regular  human corrective regimen sliding scale   SubCutaneous every 6 hours  insulin regular  human recombinant 7 Unit(s) SubCutaneous <User Schedule>  levothyroxine 125 MICROGram(s) Oral daily  lidocaine   4% Patch 1 Patch Transdermal at bedtime  lidocaine   4% Patch 1 Patch Transdermal at bedtime  melatonin Liquid 3 milliGRAM(s) Oral at bedtime  multivitamin/minerals/iron Oral Solution (CENTRUM) 15 milliLiter(s) Oral daily  pantoprazole  Injectable 40 milliGRAM(s) IV Push daily  prednisoLONE acetate 1% Suspension 1 Drop(s) Both EYES four times a day  predniSONE  Solution 5 milliGRAM(s) Oral <User Schedule>  QUEtiapine 12.5 milliGRAM(s) Oral <User Schedule>  simethicone 80 milliGRAM(s) Chew every 6 hours  surgical lubricant sterile 1 Application(s) Topical every 8 hours  vancomycin    Solution 125 milliGRAM(s) Enteral Tube every 12 hours  zinc sulfate 220 milliGRAM(s) Oral two times a day    MEDICATIONS  (PRN):  calamine/zinc oxide Lotion 1 Application(s) Topical daily PRN Rash and/or Itching  ondansetron Injectable 4 milliGRAM(s) IV Push every 8 hours PRN Nausea and/or Vomiting  oxyCODONE    Solution 5 milliGRAM(s) Enteral Tube every 4 hours PRN Moderate Pain (4 - 6)  oxyCODONE    Solution 10 milliGRAM(s) Enteral Tube every 6 hours PRN Severe Pain (7 - 10)  sodium chloride 0.65% Nasal 1 Spray(s) Both Nostrils every 12 hours PRN Congestion    PHYSICAL EXAM  General: No acute distress, resting comfortably in bed.  Abdomen: soft, nondistended, non tender; GJ tube 5cm at the bumper, dressing clean/dry; prior PEG tube site with small amount of clear blue green mucous drainage on dressing      LABS:                        8.8    7.88  )-----------( 128      ( 2024 04:35 )             29.5         137  |  100  |  14  ----------------------------<  70  3.8   |  26  |  <0.30<L>    Ca    8.7      2024 04:35  Phos  3.0       Mg     1.9         TPro  6.3  /  Alb  2.7<L>  /  TBili  0.9  /  DBili  x   /  AST  72<H>  /  ALT  51<H>  /  AlkPhos  112        Urinalysis Basic - ( 2024 04:35 )    Color: x / Appearance: x / SG: x / pH: x  Gluc: 70 mg/dL / Ketone: x  / Bili: x / Urobili: x   Blood: x / Protein: x / Nitrite: x   Leuk Esterase: x / RBC: x / WBC x   Sq Epi: x / Non Sq Epi: x / Bacteria: x        RADIOLOGY & ADDITIONAL STUDIES:

## 2024-04-24 NOTE — PROGRESS NOTE ADULT - SUBJECTIVE AND OBJECTIVE BOX
seen earlier today     Chief Complaint: Diabetes Mellitus follow up    INTERVAL HX:  Patient seen at bedside with aide present. Patient continue on TF [place] 1.5 @55cc/hr/18hrs.   Had near hypoglycemia yesterday morning, otherwise BG hyperglycemic      Review of Systems:  General: As above  GI: No nausea, vomiting  Endocrine: no  S&Sx of hypoglycemia    Allergies    pineapple (Unknown)  Tagamet (Unknown)  heparin (Unknown)  walnut (Unknown)  metronidazole (Rash)  penicillin (Unknown)  Lyrica (Unknown)  meropenem (Rash)  Pecan, [place], Hazelnut (Unknown)    Intolerances      MEDICATIONS  (STANDING):  albuterol/ipratropium for Nebulization 3 milliLiter(s) Nebulizer every 6 hours  amLODIPine   Tablet 10 milliGRAM(s) Oral <User Schedule>  artificial tears (preservative free) Ophthalmic Solution 1 Drop(s) Both EYES four times a day  ascorbic acid 500 milliGRAM(s) Oral daily  Biotene Dry Mouth Oral Rinse 5 milliLiter(s) Swish and Spit every 6 hours  chlorhexidine 0.12% Liquid 15 milliLiter(s) Oral Mucosa every 12 hours  chlorhexidine 4% Liquid 1 Application(s) Topical <User Schedule>  cholecalciferol 2000 Unit(s) Oral daily  diclofenac sodium 1% Gel 4 Gram(s) Topical four times a day  erythromycin   Ointment 1 Application(s) Both EYES three times a day  escitalopram 10 milliGRAM(s) Oral <User Schedule>  fentaNYL   Patch  25 MICROgram(s)/Hr 1 Patch Transdermal every 72 hours  gabapentin Solution 100 milliGRAM(s) Oral <User Schedule>  insulin glargine Injectable (LANTUS) 12 Unit(s) SubCutaneous <User Schedule>  insulin regular  human corrective regimen sliding scale   SubCutaneous every 6 hours  insulin regular  human recombinant 7 Unit(s) SubCutaneous <User Schedule>  levothyroxine 125 MICROGram(s) Oral daily  lidocaine   4% Patch 1 Patch Transdermal at bedtime  lidocaine   4% Patch 1 Patch Transdermal at bedtime  melatonin Liquid 3 milliGRAM(s) Oral at bedtime  multivitamin/minerals/iron Oral Solution (CENTRUM) 15 milliLiter(s) Oral daily  pantoprazole  Injectable 40 milliGRAM(s) IV Push daily  prednisoLONE acetate 1% Suspension 1 Drop(s) Both EYES four times a day  predniSONE  Solution 5 milliGRAM(s) Oral <User Schedule>  QUEtiapine 12.5 milliGRAM(s) Oral <User Schedule>  simethicone 80 milliGRAM(s) Chew every 6 hours  surgical lubricant sterile 1 Application(s) Topical every 8 hours  vancomycin    Solution 125 milliGRAM(s) Enteral Tube every 12 hours  zinc sulfate 220 milliGRAM(s) Oral two times a day        insulin glargine Injectable (LANTUS)   12 Unit(s) SubCutaneous (04-23-24 @ 17:58)    insulin regular  human corrective regimen sliding scale   2 Unit(s) SubCutaneous (04-24-24 @ 11:55)   0 Unit(s) SubCutaneous (04-24-24 @ 06:02)   4 Unit(s) SubCutaneous (04-23-24 @ 23:56)   10 Unit(s) SubCutaneous (04-23-24 @ 17:57)    insulin regular  human recombinant   7 Unit(s) SubCutaneous (04-24-24 @ 11:56)   7 Unit(s) SubCutaneous (04-24-24 @ 06:02)   7 Unit(s) SubCutaneous (04-23-24 @ 17:57)    levothyroxine   125 MICROGram(s) Oral (04-24-24 @ 05:03)    predniSONE  Solution   5 milliGRAM(s) Oral (04-24-24 @ 11:55)        PHYSICAL EXAM:  VITALS: T(C): 36.6 (04-24-24 @ 11:46)  T(F): 97.8 (04-24-24 @ 11:46), Max: 97.8 (04-24-24 @ 11:46)  HR: 94 (04-24-24 @ 12:51) (69 - 94)  BP: 122/58 (04-24-24 @ 11:46) (106/55 - 154/75)  RR:  (16 - 20)  SpO2:  (99% - 100%)  Wt(kg): --  GENERAL: NAD  Respiratory: Respirations unlabored   Extremities: Warm, no edema  NEURO: Alert , appropriate     LABS:  POCT Blood Glucose.: 179 mg/dL (04-24-24 @ 11:32)  POCT Blood Glucose.: 98 mg/dL (04-24-24 @ 05:49)  POCT Blood Glucose.: 220 mg/dL (04-23-24 @ 23:43)  POCT Blood Glucose.: 351 mg/dL (04-23-24 @ 17:47)  POCT Blood Glucose.: 251 mg/dL (04-23-24 @ 12:06)  POCT Blood Glucose.: 76 mg/dL (04-23-24 @ 06:12)  POCT Blood Glucose.: 74 mg/dL (04-23-24 @ 06:10)  POCT Blood Glucose.: 257 mg/dL (04-22-24 @ 23:58)  POCT Blood Glucose.: 347 mg/dL (04-22-24 @ 17:50)  POCT Blood Glucose.: 260 mg/dL (04-22-24 @ 11:57)  POCT Blood Glucose.: 136 mg/dL (04-22-24 @ 05:49)  POCT Blood Glucose.: 220 mg/dL (04-21-24 @ 23:59)  POCT Blood Glucose.: 253 mg/dL (04-21-24 @ 18:46)  POCT Blood Glucose.: 275 mg/dL (04-21-24 @ 17:38)                          8.8    7.88  )-----------( 128      ( 23 Apr 2024 04:35 )             29.5     04-23    137  |  100  |  14  ----------------------------<  70  3.8   |  26  |  <0.30<L>    Ca    8.7      23 Apr 2024 04:35  Phos  3.0     04-23  Mg     1.9     04-23    TPro  6.3  /  Alb  2.7<L>  /  TBili  0.9  /  DBili  x   /  AST  72<H>  /  ALT  51<H>  /  AlkPhos  112  04-23 04-04 Chol 130 Direct LDL -- LDL calculated 63 HDL 20<L> Trig 295<H>  Thyroid Function Tests:  04-23 @ 04:35 TSH 0.13 FreeT4 -- T3 -- Anti TPO -- Anti Thyroglobulin Ab -- TSI --      A1C with Estimated Average Glucose Result: 6.2 % (04-04-24 @ 07:40)    Estimated Average Glucose: 131 mg/dL (04-04-24 @ 07:40)        Diet, NPO with Tube Feed:   Tube Feeding Modality: Jejunostomy  [employer] [place] Peptide 1.5 ([id number])  Total Volume for 24 Hours (mL): 990  Continuous  Starting Tube Feed Rate mL per Hour: 50  Increase Tube Feed Rate by (mL): 5     Every 24 hours  Until Goal Tube Feed Rate (mL per Hour): 55  Tube Feed Duration (in Hours): 18  Tube Feed Start Time: 06:00  Tube Feed Stop Time: 00:00  Free Water Flush  Pump   Rate (mL per Hour): 20   Frequency: Every 2 Hours  Alfred(7 Gm Arginine/7 Gm Glut/1.2 Gm HMB     Qty per Day:  2  No Carb Prosource TF     Qty per Day:  1  Banatrol TF     Qty per Day:  1 (04-24-24 @ 14:06) [Active]

## 2024-04-25 ENCOUNTER — TRANSCRIPTION ENCOUNTER (OUTPATIENT)
Age: 74
End: 2024-04-25

## 2024-04-25 LAB
GLUCOSE BLDC GLUCOMTR-MCNC: 119 MG/DL — HIGH (ref 70–99)
GLUCOSE BLDC GLUCOMTR-MCNC: 120 MG/DL — HIGH (ref 70–99)
GLUCOSE BLDC GLUCOMTR-MCNC: 143 MG/DL — HIGH (ref 70–99)
GLUCOSE BLDC GLUCOMTR-MCNC: 196 MG/DL — HIGH (ref 70–99)
GLUCOSE BLDC GLUCOMTR-MCNC: 205 MG/DL — HIGH (ref 70–99)

## 2024-04-25 PROCEDURE — 17250 CHEM CAUT OF GRANLTJ TISSUE: CPT

## 2024-04-25 PROCEDURE — 99223 1ST HOSP IP/OBS HIGH 75: CPT | Mod: FS,25

## 2024-04-25 PROCEDURE — 99232 SBSQ HOSP IP/OBS MODERATE 35: CPT | Mod: FS

## 2024-04-25 PROCEDURE — 31615 TRCHEOBRNCHSC EST TRACHS INC: CPT

## 2024-04-25 PROCEDURE — 99232 SBSQ HOSP IP/OBS MODERATE 35: CPT

## 2024-04-25 RX ORDER — PREDNISOLONE SODIUM PHOSPHATE 1 %
1 DROPS OPHTHALMIC (EYE)
Qty: 30 | Refills: 3
Start: 2024-04-25 | End: 2024-08-22

## 2024-04-25 RX ORDER — INSULIN HUMAN 100 [IU]/ML
10 INJECTION, SOLUTION SUBCUTANEOUS
Refills: 0 | Status: DISCONTINUED | OUTPATIENT
Start: 2024-04-25 | End: 2024-04-26

## 2024-04-25 RX ORDER — TOBRAMYCIN 0.3 %
2 DROPS OPHTHALMIC (EYE)
Refills: 0 | DISCHARGE

## 2024-04-25 RX ORDER — IPRATROPIUM/ALBUTEROL SULFATE 18-103MCG
3 AEROSOL WITH ADAPTER (GRAM) INHALATION
Qty: 99 | Refills: 3
Start: 2024-04-25 | End: 2024-08-22

## 2024-04-25 RX ORDER — ASCORBIC ACID 60 MG
1 TABLET,CHEWABLE ORAL
Qty: 30 | Refills: 3
Start: 2024-04-25 | End: 2024-08-22

## 2024-04-25 RX ORDER — ERYTHROMYCIN BASE 5 MG/GRAM
1 OINTMENT (GRAM) OPHTHALMIC (EYE) AT BEDTIME
Refills: 0 | Status: DISCONTINUED | OUTPATIENT
Start: 2024-04-25 | End: 2024-05-01

## 2024-04-25 RX ORDER — INSULIN HUMAN 100 [IU]/ML
10 INJECTION, SOLUTION SUBCUTANEOUS
Qty: 3 | Refills: 3
Start: 2024-04-25 | End: 2024-08-22

## 2024-04-25 RX ORDER — CALAMINE AND ZINC OXIDE AND PHENOL 160; 10 MG/ML; MG/ML
1 LOTION TOPICAL
Qty: 0 | Refills: 0 | DISCHARGE
Start: 2024-04-25

## 2024-04-25 RX ORDER — SIMETHICONE 80 MG/1
1 TABLET, CHEWABLE ORAL
Qty: 120 | Refills: 3
Start: 2024-04-25 | End: 2024-08-22

## 2024-04-25 RX ORDER — ESCITALOPRAM OXALATE 10 MG/1
1 TABLET, FILM COATED ORAL
Qty: 30 | Refills: 3
Start: 2024-04-25 | End: 2024-08-22

## 2024-04-25 RX ORDER — IPRATROPIUM/ALBUTEROL SULFATE 18-103MCG
3 AEROSOL WITH ADAPTER (GRAM) INHALATION
Refills: 0 | DISCHARGE

## 2024-04-25 RX ORDER — LANOLIN/MINERAL OIL
1 LOTION (ML) TOPICAL
Refills: 0 | DISCHARGE

## 2024-04-25 RX ORDER — INSULIN HUMAN 100 [IU]/ML
7 INJECTION, SOLUTION SUBCUTANEOUS
Qty: 3 | Refills: 3
Start: 2024-04-25 | End: 2024-08-22

## 2024-04-25 RX ORDER — AMLODIPINE BESYLATE 2.5 MG/1
1 TABLET ORAL
Qty: 30 | Refills: 3
Start: 2024-04-25 | End: 2024-08-22

## 2024-04-25 RX ORDER — INSULIN GLARGINE 100 [IU]/ML
12 INJECTION, SOLUTION SUBCUTANEOUS
Qty: 10 | Refills: 3
Start: 2024-04-25 | End: 2024-08-22

## 2024-04-25 RX ORDER — LEVOTHYROXINE SODIUM 125 MCG
1 TABLET ORAL
Qty: 30 | Refills: 3
Start: 2024-04-25 | End: 2024-08-22

## 2024-04-25 RX ORDER — INSULIN GLARGINE 100 [IU]/ML
15 INJECTION, SOLUTION SUBCUTANEOUS
Refills: 0 | DISCHARGE

## 2024-04-25 RX ORDER — INSULIN HUMAN 100 [IU]/ML
7 INJECTION, SOLUTION SUBCUTANEOUS
Refills: 0 | Status: DISCONTINUED | OUTPATIENT
Start: 2024-04-25 | End: 2024-04-26

## 2024-04-25 RX ORDER — QUETIAPINE FUMARATE 200 MG/1
0.5 TABLET, FILM COATED ORAL
Qty: 15 | Refills: 3
Start: 2024-04-25 | End: 2024-08-22

## 2024-04-25 RX ORDER — INSULIN HUMAN 100 [IU]/ML
10 INJECTION, SOLUTION SUBCUTANEOUS
Refills: 0 | DISCHARGE

## 2024-04-25 RX ORDER — DICLOFENAC SODIUM 30 MG/G
1 GEL TOPICAL
Qty: 3 | Refills: 3
Start: 2024-04-25 | End: 2024-08-22

## 2024-04-25 RX ORDER — VANCOMYCIN HCL 1 G
25 VIAL (EA) INTRAVENOUS
Qty: 3 | Refills: 0
Start: 2024-04-25 | End: 2024-05-24

## 2024-04-25 RX ORDER — ERYTHROMYCIN BASE 5 MG/GRAM
1 OINTMENT (GRAM) OPHTHALMIC (EYE)
Qty: 0 | Refills: 0 | DISCHARGE
Start: 2024-04-25

## 2024-04-25 RX ORDER — ACETAMINOPHEN 500 MG
30 TABLET ORAL
Qty: 3600 | Refills: 3
Start: 2024-04-25 | End: 2024-08-22

## 2024-04-25 RX ORDER — LANOLIN ALCOHOL/MO/W.PET/CERES
12 CREAM (GRAM) TOPICAL
Qty: 360 | Refills: 3
Start: 2024-04-25 | End: 2024-08-22

## 2024-04-25 RX ORDER — MULTIVIT-MIN/FERROUS GLUCONATE 9 MG/15 ML
15 LIQUID (ML) ORAL
Qty: 450 | Refills: 3
Start: 2024-04-25 | End: 2024-08-22

## 2024-04-25 RX ADMIN — Medication 5 MILLILITER(S): at 05:53

## 2024-04-25 RX ADMIN — LIDOCAINE 1 PATCH: 4 CREAM TOPICAL at 07:37

## 2024-04-25 RX ADMIN — Medication 1 DROP(S): at 00:26

## 2024-04-25 RX ADMIN — Medication 5 MILLILITER(S): at 00:23

## 2024-04-25 RX ADMIN — Medication 1 DROP(S): at 05:52

## 2024-04-25 RX ADMIN — Medication 3 MILLILITER(S): at 23:46

## 2024-04-25 RX ADMIN — ZINC SULFATE TAB 220 MG (50 MG ZINC EQUIVALENT) 220 MILLIGRAM(S): 220 (50 ZN) TAB at 06:36

## 2024-04-25 RX ADMIN — Medication 1 DROP(S): at 17:52

## 2024-04-25 RX ADMIN — Medication 5 MILLILITER(S): at 11:06

## 2024-04-25 RX ADMIN — ESCITALOPRAM OXALATE 10 MILLIGRAM(S): 10 TABLET, FILM COATED ORAL at 17:51

## 2024-04-25 RX ADMIN — Medication 3 MILLILITER(S): at 05:12

## 2024-04-25 RX ADMIN — Medication 125 MILLIGRAM(S): at 17:52

## 2024-04-25 RX ADMIN — INSULIN HUMAN 2: 100 INJECTION, SOLUTION SUBCUTANEOUS at 00:27

## 2024-04-25 RX ADMIN — Medication 3 MILLILITER(S): at 18:09

## 2024-04-25 RX ADMIN — Medication 1 APPLICATION(S): at 14:17

## 2024-04-25 RX ADMIN — CHLORHEXIDINE GLUCONATE 1 APPLICATION(S): 213 SOLUTION TOPICAL at 05:53

## 2024-04-25 RX ADMIN — SIMETHICONE 80 MILLIGRAM(S): 80 TABLET, CHEWABLE ORAL at 17:51

## 2024-04-25 RX ADMIN — Medication 5 MILLILITER(S): at 17:50

## 2024-04-25 RX ADMIN — Medication 1 DROP(S): at 11:08

## 2024-04-25 RX ADMIN — DICLOFENAC SODIUM 4 GRAM(S): 30 GEL TOPICAL at 00:24

## 2024-04-25 RX ADMIN — Medication 5 MILLIGRAM(S): at 09:21

## 2024-04-25 RX ADMIN — LIDOCAINE 1 PATCH: 4 CREAM TOPICAL at 22:03

## 2024-04-25 RX ADMIN — INSULIN GLARGINE 12 UNIT(S): 100 INJECTION, SOLUTION SUBCUTANEOUS at 18:39

## 2024-04-25 RX ADMIN — Medication 5 MILLILITER(S): at 23:58

## 2024-04-25 RX ADMIN — LIDOCAINE 1 PATCH: 4 CREAM TOPICAL at 09:37

## 2024-04-25 RX ADMIN — Medication 125 MICROGRAM(S): at 05:47

## 2024-04-25 RX ADMIN — Medication 1 APPLICATION(S): at 05:53

## 2024-04-25 RX ADMIN — GABAPENTIN 100 MILLIGRAM(S): 400 CAPSULE ORAL at 22:04

## 2024-04-25 RX ADMIN — INSULIN HUMAN 4: 100 INJECTION, SOLUTION SUBCUTANEOUS at 11:25

## 2024-04-25 RX ADMIN — Medication 1 DROP(S): at 11:07

## 2024-04-25 RX ADMIN — SIMETHICONE 80 MILLIGRAM(S): 80 TABLET, CHEWABLE ORAL at 00:26

## 2024-04-25 RX ADMIN — SIMETHICONE 80 MILLIGRAM(S): 80 TABLET, CHEWABLE ORAL at 06:35

## 2024-04-25 RX ADMIN — Medication 1 DROP(S): at 16:13

## 2024-04-25 RX ADMIN — INSULIN HUMAN 7 UNIT(S): 100 INJECTION, SOLUTION SUBCUTANEOUS at 18:39

## 2024-04-25 RX ADMIN — Medication 500 MILLIGRAM(S): at 11:07

## 2024-04-25 RX ADMIN — Medication 3 MILLILITER(S): at 11:11

## 2024-04-25 RX ADMIN — GABAPENTIN 100 MILLIGRAM(S): 400 CAPSULE ORAL at 09:21

## 2024-04-25 RX ADMIN — PANTOPRAZOLE SODIUM 40 MILLIGRAM(S): 20 TABLET, DELAYED RELEASE ORAL at 11:08

## 2024-04-25 RX ADMIN — INSULIN HUMAN 0: 100 INJECTION, SOLUTION SUBCUTANEOUS at 06:08

## 2024-04-25 RX ADMIN — Medication 125 MILLIGRAM(S): at 06:36

## 2024-04-25 RX ADMIN — Medication 1 APPLICATION(S): at 22:04

## 2024-04-25 RX ADMIN — Medication 1 DROP(S): at 17:50

## 2024-04-25 RX ADMIN — DICLOFENAC SODIUM 4 GRAM(S): 30 GEL TOPICAL at 05:53

## 2024-04-25 RX ADMIN — AMLODIPINE BESYLATE 10 MILLIGRAM(S): 2.5 TABLET ORAL at 09:20

## 2024-04-25 RX ADMIN — Medication 2000 UNIT(S): at 11:07

## 2024-04-25 RX ADMIN — QUETIAPINE FUMARATE 12.5 MILLIGRAM(S): 200 TABLET, FILM COATED ORAL at 14:28

## 2024-04-25 RX ADMIN — DICLOFENAC SODIUM 4 GRAM(S): 30 GEL TOPICAL at 17:52

## 2024-04-25 RX ADMIN — DICLOFENAC SODIUM 4 GRAM(S): 30 GEL TOPICAL at 11:08

## 2024-04-25 RX ADMIN — Medication 1 DROP(S): at 14:29

## 2024-04-25 RX ADMIN — INSULIN HUMAN 7 UNIT(S): 100 INJECTION, SOLUTION SUBCUTANEOUS at 11:26

## 2024-04-25 RX ADMIN — INSULIN HUMAN 7 UNIT(S): 100 INJECTION, SOLUTION SUBCUTANEOUS at 06:09

## 2024-04-25 RX ADMIN — CHLORHEXIDINE GLUCONATE 15 MILLILITER(S): 213 SOLUTION TOPICAL at 17:50

## 2024-04-25 RX ADMIN — CHLORHEXIDINE GLUCONATE 15 MILLILITER(S): 213 SOLUTION TOPICAL at 05:53

## 2024-04-25 RX ADMIN — Medication 3 MILLIGRAM(S): at 22:02

## 2024-04-25 RX ADMIN — Medication 1 DROP(S): at 23:59

## 2024-04-25 RX ADMIN — SIMETHICONE 80 MILLIGRAM(S): 80 TABLET, CHEWABLE ORAL at 23:58

## 2024-04-25 RX ADMIN — Medication 1 DROP(S): at 23:58

## 2024-04-25 RX ADMIN — LIDOCAINE 1 PATCH: 4 CREAM TOPICAL at 09:30

## 2024-04-25 RX ADMIN — Medication 15 MILLILITER(S): at 11:08

## 2024-04-25 RX ADMIN — SIMETHICONE 80 MILLIGRAM(S): 80 TABLET, CHEWABLE ORAL at 11:07

## 2024-04-25 NOTE — CHART NOTE - NSCHARTNOTEFT_GEN_A_CORE
Patient will require [employer] 1.5 for diagnosis of __oropharyngeal dysphagia/R13.12 for a lifetime (99months).   Patient also requires a feeding pump due to her inability to tolerate large volumes or bolus feeds within stomach due to emesis and aspiration risk.

## 2024-04-25 NOTE — PROGRESS NOTE ADULT - ASSESSMENT
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 in setting of autoimmune disease   - Continue erythromycin ointment nightly in both eyes - ordered  - Continue preservative free artificial tears every two hours OU - ordered  - Appreciate rheumatology input as anterior scleritis is sign of active RA  - Continue predforte drops to both eyes 4 times daily (ordered)  - Will need continued outpatient followup    # Rule out endophthalmitis   - 74F with above medical hx was found to be bacteremic with serratia marcescens, denying blurry vision, only reporting burning of both eyes   - FARAZ VA 2/2 trach, IOP wnl OU, no rAPD OU, EOMs full  - Anterior exam with no hypopion, DFE with no signs of vitritis OU  - No concern for endophthalmitis at this time    DW Dr. Guerrero, attending     Outpatient Follow-up: Patient should follow-up with his/her ophthalmologist or with [facility] Department of Ophthalmology within 1 week of after discharge at:    [address]  [place] [address]  [phone]

## 2024-04-25 NOTE — PROGRESS NOTE ADULT - ASSESSMENT
#Gastrostomy malfunction/buried bumper  s/p PEG-J placement 4/16  tolerating feeds  ongoing surgery care appreciated     #Recurrent C.diff   per ID

## 2024-04-25 NOTE — PROGRESS NOTE ADULT - SUBJECTIVE AND OBJECTIVE BOX
Follow Up:  recurent cdiff    Interval History/ROS:  sleeping on vent    Allergies  pineapple (Unknown)  Tagamet (Unknown)  heparin (Unknown)  walnut (Unknown)  metronidazole (Rash)  penicillin (Unknown)  Lyrica (Unknown)  meropenem (Rash)  Pecan, Andressa, Hazelnut (Unknown)        ANTIMICROBIALS:  vancomycin    Solution 125 every 12 hours      OTHER MEDS:  MEDICATIONS  (STANDING):  albuterol/ipratropium for Nebulization 3 every 6 hours  amLODIPine   Tablet 10 <User Schedule>  escitalopram 10 <User Schedule>  gabapentin Solution 100 <User Schedule>  insulin glargine Injectable (LANTUS) 12 <User Schedule>  insulin regular  human corrective regimen sliding scale  every 6 hours  insulin regular  human recombinant 10 <User Schedule>  insulin regular  human recombinant 7 <User Schedule>  levothyroxine 125 daily  melatonin Liquid 3 at bedtime  ondansetron Injectable 4 every 8 hours PRN  oxyCODONE    Solution 5 every 4 hours PRN  oxyCODONE    Solution 10 every 6 hours PRN  pantoprazole  Injectable 40 daily  predniSONE  Solution 5 <User Schedule>  QUEtiapine 12.5 <User Schedule>  simethicone 80 every 6 hours      Vital Signs Last 24 Hrs  T(C): 36.9 (25 Apr 2024 11:29), Max: 36.9 (24 Apr 2024 18:16)  T(F): 98.5 (25 Apr 2024 11:29), Max: 98.5 (24 Apr 2024 18:16)  HR: 91 (25 Apr 2024 15:10) (74 - 102)  BP: 116/54 (25 Apr 2024 11:29) (105/52 - 125/51)  BP(mean): --  RR: 18 (25 Apr 2024 11:29) (10 - 20)  SpO2: 100% (25 Apr 2024 15:10) (99% - 100%)    Parameters below as of 25 Apr 2024 11:32  Patient On (Oxygen Delivery Method): ventilator        PHYSICAL EXAM:  General: WN/WD NAD, Non-toxic  Neurology: A&Ox3, nonfocal  Respiratory: Clear to auscultation bilaterally  CV: RRR, S1S2, no murmurs, rubs or gallops  Abdominal: Soft, Non-tender, distended  Extremities: generalized edema,   Line Sites: Clear  Skin: No rash      MICROBIOLOGY:  .Blood Blood-Peripheral  04-15-24   No growth at 5 days  --  --      .Blood Blood-Peripheral  04-15-24   No growth at 5 days  --  --      Catheterized Catheterized  04-12-24   No growth  --  --      .Blood Blood-Peripheral  04-12-24   No growth at 5 days  --  --      .Blood Blood-Peripheral  04-11-24   No growth at 5 days  --  --      .Blood Blood-Peripheral  04-09-24   No growth at 5 days  --  --      .Sputum Sputum  04-07-24   Mixed gram negative rods Culture includes  Moderate Stenotrophomonas maltophilia  Few Pseudomonas aeruginosa (Carbapenem Resistant)  --  Stenotrophomonas maltophilia  Pseudomonas aeruginosa (Carbapenem Resistant)      .Blood Blood-Peripheral  04-07-24   No growth at 5 days  --  Blood Culture PCR  Serratia marcescens      .Sputum Sputum  04-05-24   Numerous Pseudomonas aeruginosa  Normal Respiratory Shadna present  --  Pseudomonas aeruginosa      .Blood Blood-Peripheral  04-04-24   No growth at 5 days  --  --      Clean Catch Clean Catch (Midstream)  04-03-24   <10,000 CFU/mL Normal Urogenital Shanda  --  --      .Blood Blood-Peripheral  04-02-24   No growth at 5 days  --  --      RADIOLOGY:    Zion Newman MD; Division of Infectious Disease; Pager: [phone]; nights and weekends: [phone]

## 2024-04-25 NOTE — PROGRESS NOTE ADULT - ASSESSMENT
75 yo F PMH T2DM on insulin, HTN, HLD, hypothyroidism, RA on Prednisone, Fibromyalgia, cerebral aneurysm s/p repair, chronic hypercapnic respiratory failure s/p trach (vent dependent) and Chronic PEG 2022, recurrent C. diff infection (follows with Dr. Newman) , bedbound who presented from home with G tube dislodgement and redness around the site. Had CT Abdomen/Pelvis done showing dislodgement of G tube with balloon in the anterior abdominal wall with air filled track to stomach. Admitted to MICU for ventilator management and given vancomycin/meropenem empirically for treatment of abd wall cellulitis. Per family patient has had Known c diff infection for past 2-3 weeks and was being treated with Fidaxomicin, which completed inpatient.  IR team exchanged PEG on 4/3 however later in the day it remained with leakage.  IR Attending adjusted PEG at bedside on 4/4 and PEG remained with moderate amount of PEG leakage once feeds initiated at reduced rate.  GI team re-consulted as second opinion for PEG management.  On 4/9 PEG was found out of place, a emerson catheter was placed in the stoma to maintain patency.  Patient was planned for PEG revision with both Surgery and GI team involved on 4/11 however patient had fevers up to 102F and procedure was cancelled for infectious work-up.  An 18 Fr PEG tube placed at bedside on 4/12 (original PEG size was 20Fr) as stoma site appears to have closed. Antibiotics were switched from Meropenem to Cefepime, completed 4/14. Patient now s/p PEG-J and closure of gastric fistula with GI and surgery in endo suite on 4/16.  New stoma site with no leakage, pt has been tolerating feeds.  A FEES was performed on 4/23 with recs for comfort feeds with parameters as per SLP.  Pt to continue vanco taper until 05/23 for c diff treatment with outpatient follow up with Dr. Newman.  Continuing to pressure support as tolerated.  Pt to return home.        4/24: No events reported overnight, Home Vent trial performed today without issues. Patient remains medically stable for discharge to home. Daughter with concerns over frequency of BMs ( 4 in 24 hrs); will add Banana Flakes QD.   75 yo F PMH T2DM on insulin, HTN, HLD, hypothyroidism, RA on Prednisone, Fibromyalgia, cerebral aneurysm s/p repair, chronic hypercapnic respiratory failure s/p trach (vent dependent) and Chronic PEG 2022, recurrent C. diff infection (follows with Dr. Newman) , bedbound who presented from home with G tube dislodgement and redness around the site. Had CT Abdomen/Pelvis done showing dislodgement of G tube with balloon in the anterior abdominal wall with air filled track to stomach. Admitted to MICU for ventilator management and given vancomycin/meropenem empirically for treatment of abd wall cellulitis. Per family patient has had Known c diff infection for past 2-3 weeks and was being treated with Fidaxomicin, which completed inpatient.  IR team exchanged PEG on 4/3 however later in the day it remained with leakage.  IR Attending adjusted PEG at bedside on 4/4 and PEG remained with moderate amount of PEG leakage once feeds initiated at reduced rate.  GI team re-consulted as second opinion for PEG management.  On 4/9 PEG was found out of place, a emerson catheter was placed in the stoma to maintain patency.  Patient was planned for PEG revision with both Surgery and GI team involved on 4/11 however patient had fevers up to 102F and procedure was cancelled for infectious work-up.  An 18 Fr PEG tube placed at bedside on 4/12 (original PEG size was 20Fr) as stoma site appears to have closed. Antibiotics were switched from Meropenem to Cefepime, completed 4/14. Patient now s/p PEG-J and closure of gastric fistula with GI and surgery in endo suite on 4/16.  New stoma site with no leakage, pt has been tolerating feeds.  A FEES was performed on 4/23 with recs for comfort feeds with parameters as per SLP.  Pt to continue vanco taper until 05/23 for c diff treatment with outpatient follow up with Dr. Newman.  Continuing to pressure support as tolerated.  Pt to return home.      4/24: No events reported overnight, Home Vent trial performed today without issues. Patient remains medically stable for discharge to home. Daughter with concerns over frequency of BMs ( 4 in 24 hrs); will add Banana Flakes QD.   4/25: No events over night.  There was moderate amount of clear viscous secretions that accumulated over night from old PEG stoma site.  Otherwise, remains medically stable for discharge to home on ventilator with home care once services available.

## 2024-04-25 NOTE — PROGRESS NOTE ADULT - SUBJECTIVE AND OBJECTIVE BOX
seen earlier today     Chief Complaint: Diabetes Mellitus follow up    INTERVAL HX:  Patient seen with aide at bedside. Still asleep. [employer] running at 55cc/hr. tolerating well.   BG values over last 24 hours most at goal except for dinner BG which has been hyperglycemic upto 350mg/dl.     Review of Systems:  General: As above  GI: No nausea, vomiting  Endocrine: no  S&Sx of hypoglycemia    Allergies    pineapple (Unknown)  Tagamet (Unknown)  heparin (Unknown)  walnut (Unknown)  metronidazole (Rash)  penicillin (Unknown)  Lyrica (Unknown)  meropenem (Rash)  Pecan, Filbert, Hazelnut (Unknown)    Intolerances      MEDICATIONS  (STANDING):  albuterol/ipratropium for Nebulization 3 milliLiter(s) Nebulizer every 6 hours  amLODIPine   Tablet 10 milliGRAM(s) Oral <User Schedule>  artificial tears (preservative free) Ophthalmic Solution 1 Drop(s) Both EYES four times a day  ascorbic acid 500 milliGRAM(s) Oral daily  Biotene Dry Mouth Oral Rinse 5 milliLiter(s) Swish and Spit every 6 hours  chlorhexidine 0.12% Liquid 15 milliLiter(s) Oral Mucosa every 12 hours  chlorhexidine 4% Liquid 1 Application(s) Topical <User Schedule>  cholecalciferol 2000 Unit(s) Oral daily  diclofenac sodium 1% Gel 4 Gram(s) Topical four times a day  erythromycin   Ointment 1 Application(s) Both EYES three times a day  escitalopram 10 milliGRAM(s) Oral <User Schedule>  gabapentin Solution 100 milliGRAM(s) Oral <User Schedule>  insulin glargine Injectable (LANTUS) 12 Unit(s) SubCutaneous <User Schedule>  insulin regular  human corrective regimen sliding scale   SubCutaneous every 6 hours  insulin regular  human recombinant 7 Unit(s) SubCutaneous <User Schedule>  levothyroxine 125 MICROGram(s) Oral daily  lidocaine   4% Patch 1 Patch Transdermal at bedtime  lidocaine   4% Patch 1 Patch Transdermal at bedtime  melatonin Liquid 3 milliGRAM(s) Oral at bedtime  multivitamin/minerals/iron Oral Solution (CENTRUM) 15 milliLiter(s) Oral daily  pantoprazole  Injectable 40 milliGRAM(s) IV Push daily  prednisoLONE acetate 1% Suspension 1 Drop(s) Both EYES four times a day  predniSONE  Solution 5 milliGRAM(s) Oral <User Schedule>  QUEtiapine 12.5 milliGRAM(s) Oral <User Schedule>  simethicone 80 milliGRAM(s) Chew every 6 hours  surgical lubricant sterile 1 Application(s) Topical every 8 hours  vancomycin    Solution 125 milliGRAM(s) Enteral Tube every 12 hours        insulin glargine Injectable (LANTUS)   12 Unit(s) SubCutaneous (04-24-24 @ 18:18)    insulin regular  human corrective regimen sliding scale   4 Unit(s) SubCutaneous (04-25-24 @ 11:25)   0 Unit(s) SubCutaneous (04-25-24 @ 06:08)   2 Unit(s) SubCutaneous (04-25-24 @ 00:27)   6 Unit(s) SubCutaneous (04-24-24 @ 18:18)    insulin regular  human recombinant   7 Unit(s) SubCutaneous (04-25-24 @ 11:26)   7 Unit(s) SubCutaneous (04-25-24 @ 06:09)   7 Unit(s) SubCutaneous (04-24-24 @ 18:19)    levothyroxine   125 MICROGram(s) Oral (04-25-24 @ 05:47)    predniSONE  Solution   5 milliGRAM(s) Oral (04-25-24 @ 09:21)        PHYSICAL EXAM:  VITALS: T(C): 36.9 (04-25-24 @ 11:29)  T(F): 98.5 (04-25-24 @ 11:29), Max: 98.5 (04-24-24 @ 18:16)  HR: 74 (04-25-24 @ 11:32) (74 - 102)  BP: 116/54 (04-25-24 @ 11:29) (105/52 - 125/51)  RR:  (10 - 20)  SpO2:  (99% - 100%)  Wt(kg): --  GENERAL: NAD  Respiratory: Respirations unlabored   Extremities: Warm, no edema  NEURO: Alert , appropriate     LABS:  POCT Blood Glucose.: 205 mg/dL (04-25-24 @ 11:25)  POCT Blood Glucose.: 119 mg/dL (04-25-24 @ 05:59)  POCT Blood Glucose.: 196 mg/dL (04-25-24 @ 00:25)  POCT Blood Glucose.: 278 mg/dL (04-24-24 @ 18:03)  POCT Blood Glucose.: 179 mg/dL (04-24-24 @ 11:32)  POCT Blood Glucose.: 98 mg/dL (04-24-24 @ 05:49)  POCT Blood Glucose.: 220 mg/dL (04-23-24 @ 23:43)  POCT Blood Glucose.: 351 mg/dL (04-23-24 @ 17:47)  POCT Blood Glucose.: 251 mg/dL (04-23-24 @ 12:06)  POCT Blood Glucose.: 76 mg/dL (04-23-24 @ 06:12)  POCT Blood Glucose.: 74 mg/dL (04-23-24 @ 06:10)  POCT Blood Glucose.: 257 mg/dL (04-22-24 @ 23:58)  POCT Blood Glucose.: 347 mg/dL (04-22-24 @ 17:50)                          8.8    7.88  )-----------( 128      ( 23 Apr 2024 04:35 )             29.5             04-04 Chol 130 Direct LDL -- LDL calculated 63 HDL 20<L> Trig 295<H>  Thyroid Function Tests:  04-23 @ 04:35 TSH 0.13 FreeT4 -- T3 -- Anti TPO -- Anti Thyroglobulin Ab -- TSI --      A1C with Estimated Average Glucose Result: 6.2 % (04-04-24 @ 07:40)    Estimated Average Glucose: 131 mg/dL (04-04-24 @ 07:40)        Diet, NPO with Tube Feed:   Tube Feeding Modality: Jejunostomy  [employer] Peptide 1.5 (KFPEPT1.5RTH)  Total Volume for 24 Hours (mL): 990  Continuous  Starting Tube Feed Rate mL per Hour: 50  Increase Tube Feed Rate by (mL): 5     Every 24 hours  Until Goal Tube Feed Rate (mL per Hour): 55  Tube Feed Duration (in Hours): 18  Tube Feed Start Time: 06:00  Tube Feed Stop Time: 00:00  Free Water Flush  Pump   Rate (mL per Hour): 20   Frequency: Every 2 Hours  Alfred(7 Gm Arginine/7 Gm Glut/1.2 Gm HMB     Qty per Day:  2  No Carb Prosource TF     Qty per Day:  1  Banatrol TF     Qty per Day:  1 (04-24-24 @ 14:06) [Active]

## 2024-04-25 NOTE — PROGRESS NOTE ADULT - PROBLEM SELECTOR PLAN 2
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 since 10/2022   - S/p Trach # 8 Distal XLT Shiley Cuffed   - Home Vent: volume /12/30%/+5     - Settings changed on 4/11 to 350/12/30%/+5 as she complained of dyspnea  - Follow up ABG Post adjustment WNL   - Home vent trial performed on 4/24 ( Passed) and Home Vent Settings adjusted as per RT from [facility]   - Continue PS Trials as tolerated  - Chest PT, Duonebs q 6 hrs

## 2024-04-25 NOTE — CONSULT NOTE ADULT - CONSULT REQUESTED DATE/TIME
09-Apr-2024 12:32
17-Apr-2024 16:50
03-Apr-2024 12:39
25-Apr-2024 14:26
10-Apr-2024 19:06
02-Apr-2024 19:44
03-Apr-2024 09:44
16-Apr-2024 09:41
06-Apr-2024 10:57
21-Apr-2024 09:43

## 2024-04-25 NOTE — PROCEDURE NOTE - ADDITIONAL PROCEDURE DETAILS
Risks and benefits discussed with pt family (who consents for patient) verbal consent obtained. Pt was placed in a supine position with neck extended. A 10 CC syringe used to completely removed any remaining air in the trach cuff. #8XLT(distal) cuffed trach removed and replaced with #8XLT (distal) cuffed trach. No bleeding. Clear secretions suctioned from stoma.   Bedside tracheoscopy performed + tracheal malacia noticed  nick visualized, no purulence, no erythema. Pt tolerated the procedure well without complications.    Patient suctioned before and after procedure

## 2024-04-25 NOTE — PROGRESS NOTE ADULT - SUBJECTIVE AND OBJECTIVE BOX
INTERVAL HPI/OVERNIGHT EVENTS:    tolerating feeds    MEDICATIONS  (STANDING):  albuterol/ipratropium for Nebulization 3 milliLiter(s) Nebulizer every 6 hours  amLODIPine   Tablet 10 milliGRAM(s) Oral <User Schedule>  artificial tears (preservative free) Ophthalmic Solution 1 Drop(s) Both EYES four times a day  ascorbic acid 500 milliGRAM(s) Oral daily  Biotene Dry Mouth Oral Rinse 5 milliLiter(s) Swish and Spit every 6 hours  chlorhexidine 0.12% Liquid 15 milliLiter(s) Oral Mucosa every 12 hours  chlorhexidine 4% Liquid 1 Application(s) Topical <User Schedule>  cholecalciferol 2000 Unit(s) Oral daily  diclofenac sodium 1% Gel 4 Gram(s) Topical four times a day  erythromycin   Ointment 1 Application(s) Both EYES three times a day  escitalopram 10 milliGRAM(s) Oral <User Schedule>  gabapentin Solution 100 milliGRAM(s) Oral <User Schedule>  insulin glargine Injectable (LANTUS) 12 Unit(s) SubCutaneous <User Schedule>  insulin regular  human corrective regimen sliding scale   SubCutaneous every 6 hours  insulin regular  human recombinant 7 Unit(s) SubCutaneous <User Schedule>  levothyroxine 125 MICROGram(s) Oral daily  lidocaine   4% Patch 1 Patch Transdermal at bedtime  lidocaine   4% Patch 1 Patch Transdermal at bedtime  melatonin Liquid 3 milliGRAM(s) Oral at bedtime  multivitamin/minerals/iron Oral Solution (CENTRUM) 15 milliLiter(s) Oral daily  pantoprazole  Injectable 40 milliGRAM(s) IV Push daily  prednisoLONE acetate 1% Suspension 1 Drop(s) Both EYES four times a day  predniSONE  Solution 5 milliGRAM(s) Oral <User Schedule>  QUEtiapine 12.5 milliGRAM(s) Oral <User Schedule>  simethicone 80 milliGRAM(s) Chew every 6 hours  surgical lubricant sterile 1 Application(s) Topical every 8 hours  vancomycin    Solution 125 milliGRAM(s) Enteral Tube every 12 hours    MEDICATIONS  (PRN):  calamine/zinc oxide Lotion 1 Application(s) Topical daily PRN Rash and/or Itching  ondansetron Injectable 4 milliGRAM(s) IV Push every 8 hours PRN Nausea and/or Vomiting  oxyCODONE    Solution 5 milliGRAM(s) Enteral Tube every 4 hours PRN Moderate Pain (4 - 6)  oxyCODONE    Solution 10 milliGRAM(s) Enteral Tube every 6 hours PRN Severe Pain (7 - 10)  sodium chloride 0.65% Nasal 1 Spray(s) Both Nostrils every 12 hours PRN Congestion      Allergies    pineapple (Unknown)  Tagamet (Unknown)  heparin (Unknown)  walnut (Unknown)  metronidazole (Rash)  penicillin (Unknown)  Lyrica (Unknown)  meropenem (Rash)  Pecan, Filbert, Hazelnut (Unknown)    Intolerances        Review of Systems:    General:  No wt loss, fevers, chills, night sweats, fatigue   Eyes:  Good vision, no reported pain  ENT:  No sore throat, pain, runny nose, dysphagia  CV:  No pain, palpitations, hypo/hypertension  Resp:  No dyspnea, cough, tachypnea, wheezing  GI:  No pain, No nausea, No vomiting, No diarrhea, No constipation, No weight loss, No fever, No pruritis, No rectal bleeding, No melena, No dysphagia  :  No pain, bleeding, incontinence, nocturia  Muscle:  No pain, weakness  Neuro:  No weakness, tingling, memory problems  Psych:  No fatigue, insomnia, mood problems, depression  Endocrine:  No polyuria, polydypsia, cold/heat intolerance  Heme:  No petechiae, ecchymosis, easy bruisability  Skin:  No rash, tattoos, scars, edema      Vital Signs Last 24 Hrs  T(C): 36.9 (25 Apr 2024 11:29), Max: 36.9 (24 Apr 2024 18:16)  T(F): 98.5 (25 Apr 2024 11:29), Max: 98.5 (24 Apr 2024 18:16)  HR: 74 (25 Apr 2024 11:32) (74 - 102)  BP: 116/54 (25 Apr 2024 11:29) (105/52 - 125/51)  BP(mean): --  RR: 18 (25 Apr 2024 11:29) (10 - 20)  SpO2: 99% (25 Apr 2024 11:32) (99% - 100%)    Parameters below as of 25 Apr 2024 11:32  Patient On (Oxygen Delivery Method): ventilator        PHYSICAL EXAM:    Constitutional: NAD  HEENT: EOMI, throat clear  Neck: No LAD, supple  Respiratory: CTA and P  Cardiovascular: S1 and S2, RRR, no M  Gastrointestinal: BS+, soft, NT/ND, neg HSM,  Extremities: No peripheral edema, neg clubbing, cyanosis  Vascular: 2+ peripheral pulses  Neurological: A/O   Psychiatric: Normal mood, normal affect  Skin: No rashes      LABS:                RADIOLOGY & ADDITIONAL TESTS:

## 2024-04-25 NOTE — PROGRESS NOTE ADULT - PROBLEM SELECTOR PLAN 4
- IV Vanco/meropenem x 1 on admission for concern for cellulitis  - 4/2 blood cultures negative x2, MRSA/MSSA PCR +  - 4/3 urine culture nl vinay, CT A/P 4/2: no infectious focus or colitis  - 4/7 blood cultures positive for serratia; txd w/ Meropenem 4/2-4/3 and 4/7-4/11, switched to cefepime 4/11-4/14  - Currently remains Afebrile w/o Leukocytosis off antibiotics

## 2024-04-25 NOTE — CONSULT NOTE ADULT - PROVIDER SPECIALTY LIST ADULT
Intervent Radiology
Surgery
Heme/Onc
Intervent Radiology
Endocrinology
Wound Care
ENT
Gastroenterology
Infectious Disease
Ophthalmology

## 2024-04-25 NOTE — CONSULT NOTE ADULT - PROBLEM SELECTOR RECOMMENDATION 9
- Tracheostomy tube changed - 8XLT (distal) cuffed   - All questions answered   - No further ENT interventions - Tracheostomy tube changed - 8XLT (distal) cuffed   - Keep trach sponge between stoma and trach to prevent granulation tissue from forming  - All questions answered   - No further ENT interventions

## 2024-04-25 NOTE — PROGRESS NOTE ADULT - ASSESSMENT
The patient is a 74y Female with PMH of T2DM on insulin, HTN, HLD, hypothyroidism, RA on Prednisone, Fibromyalgia, cerebral aneurysm s/p repair, acute hypercapnic respiratory failure s/p trach (vent dependent) and PEG (10/22), bedbound presented from home with complaints of possible G tube dislodgement and redness around the site, now s/p replacement. Endocrinology consulted for uncontrolled T2DM.    #Uncontrolled Type 2 Diabetes Mellitus   #Hyperglycemia on Tube feeds  - Follows with: Dr Rupali Ruth, endocrinology.   - A1C with Estimated Average Glucose Result: 6.2 % (04-04-24)  - Home regimen: Patient is on TF from 6AM-6PM. Takes Lantus 15 units qhs and regular insulin 10 units at 6AM, 12 units at 12PM, and 6 units at 6PM. She is on prednisone 5mg daily for RA  - eGFR: 113 mL/min/1.73m2 (04-19-24)  - Weight (kg): 48.1 (04-16-24)  - current tube feeds: [employer] 1.5 at 55 cc/hr 6AM-midnight. unclear what final home TF will be on discharge per primary team  - 0600 BG tightly controlled after receiving regular insulin although TF were held     INPATIENT PLAN:  - continue Lantus 12 units at 6PM  - c/w  Admelog to Regular insulin (longer acting)  7 units at 0600, increase Regular insulin to 10 units at 1200 continue 7 units at 1800,  NO DOSE AT MN !(HOLD if tube feeds are stopped).  - C/w mod dose admelog correction scale to moderate dose Regular insulin scale q6h  - Please check FSG q6h   - Inpatient glucose goals: 100-180      DISCHARGE PLANNING:  - Discharge recs pending clinical course and tube feeds on discharge: likely c/w lantus plus regular insulin at 6AM, 12PM, 6PM  - followup with Dr Ruth: [facility]: [address] [place] [address]. [address]. [place] [address]. Tel: [phone]    #Secondary adrenal insufficiency  - 2/2 long term use of prednisone  - c/w prednisone 5mg daily    #Hypothyroidism  - home regimen LT4 125 mcg daily  - currently on LT4 87.5 mcg IV daily  - would resume home dose of LT5 125 mcg PO daily. Please administer in the AM on an empty stomach, 1 hour before food or other medications, and 4 hours before any PPI, calcium, or iron supplements. Can give at 5AM since tube feeds start at 6AM.    - check TSH and free T4    #Hypertension  - Goal BP <130/80  - on amlodipine  - Management as per primary team  - check urine microalbumin level as outpatient    #Hyperlipidemia  - LDL goal <70  - Last LDL: 63 mg/dL (04-04-24)  - consider mod dose statin if no contraindication  - check lipid panel as outpatient on a yearly basis    Contact via [employer] Teams during business hours  To reach covering provider access AMION via sunrise tools  For Urgent matters/after-hours/weekends/holidays please page endocrine fellow on call   For nonurgent matters please email [email or web address]    Please note that this patient may be followed by different provider tomorrow.  Notify endocrine 24 hours prior to discharge for final recommendations

## 2024-04-25 NOTE — DISCHARGE NOTE NURSING/CASE MANAGEMENT/SOCIAL WORK - NSDCPEFALRISK_GEN_ALL_CORE
For information on Fall & Injury Prevention, visit: [email or web address] OR  [email or web address] OR  https://www.cdc.gov/steadi/patient.html

## 2024-04-25 NOTE — CONSULT NOTE ADULT - CONSULT REASON
cellulitis; C diff
Dislodged PEG
Tracheostomy tube exchange
PEG Malfunction
sacral/bilateral buttocks skin damage
G tube adjustment vs GJ
Rule out endophthalmitis
Thrombocytopenia
PEG Exchange
T2DM c/b hyperglycemia

## 2024-04-25 NOTE — PROGRESS NOTE ADULT - SUBJECTIVE AND OBJECTIVE BOX
[facility] DEPARTMENT OF OPHTHALMOLOGY  ------------------------------------------------------------------------------  Stephania Ruiz MD, PGY-3  Contact: TEAMS  ------------------------------------------------------------------------------    Interval History: No acute events overnight. Today patient denying blurred vision, eye pain, flashes, floaters, FBS, erythema, or discharge.     MEDICATIONS  (STANDING):  albuterol/ipratropium for Nebulization 3 milliLiter(s) Nebulizer every 6 hours  amLODIPine   Tablet 10 milliGRAM(s) Oral <User Schedule>  artificial tears (preservative free) Ophthalmic Solution 1 Drop(s) Both EYES four times a day  ascorbic acid 500 milliGRAM(s) Oral daily  Biotene Dry Mouth Oral Rinse 5 milliLiter(s) Swish and Spit every 6 hours  chlorhexidine 0.12% Liquid 15 milliLiter(s) Oral Mucosa every 12 hours  chlorhexidine 4% Liquid 1 Application(s) Topical <User Schedule>  cholecalciferol 2000 Unit(s) Oral daily  diclofenac sodium 1% Gel 4 Gram(s) Topical four times a day  erythromycin   Ointment 1 Application(s) Both EYES three times a day  escitalopram 10 milliGRAM(s) Oral <User Schedule>  gabapentin Solution 100 milliGRAM(s) Oral <User Schedule>  insulin glargine Injectable (LANTUS) 12 Unit(s) SubCutaneous <User Schedule>  insulin regular  human corrective regimen sliding scale   SubCutaneous every 6 hours  insulin regular  human recombinant 7 Unit(s) SubCutaneous <User Schedule>  levothyroxine 125 MICROGram(s) Oral daily  lidocaine   4% Patch 1 Patch Transdermal at bedtime  lidocaine   4% Patch 1 Patch Transdermal at bedtime  melatonin Liquid 3 milliGRAM(s) Oral at bedtime  multivitamin/minerals/iron Oral Solution (CENTRUM) 15 milliLiter(s) Oral daily  pantoprazole  Injectable 40 milliGRAM(s) IV Push daily  prednisoLONE acetate 1% Suspension 1 Drop(s) Both EYES four times a day  predniSONE  Solution 5 milliGRAM(s) Oral <User Schedule>  QUEtiapine 12.5 milliGRAM(s) Oral <User Schedule>  simethicone 80 milliGRAM(s) Chew every 6 hours  surgical lubricant sterile 1 Application(s) Topical every 8 hours  vancomycin    Solution 125 milliGRAM(s) Enteral Tube every 12 hours    MEDICATIONS  (PRN):  calamine/zinc oxide Lotion 1 Application(s) Topical daily PRN Rash and/or Itching  ondansetron Injectable 4 milliGRAM(s) IV Push every 8 hours PRN Nausea and/or Vomiting  oxyCODONE    Solution 5 milliGRAM(s) Enteral Tube every 4 hours PRN Moderate Pain (4 - 6)  oxyCODONE    Solution 10 milliGRAM(s) Enteral Tube every 6 hours PRN Severe Pain (7 - 10)  sodium chloride 0.65% Nasal 1 Spray(s) Both Nostrils every 12 hours PRN Congestion      VITALS: T(C): 36.9 (04-25-24 @ 11:29)  T(F): 98.5 (04-25-24 @ 11:29), Max: 98.5 (04-24-24 @ 18:16)  HR: 74 (04-25-24 @ 11:32) (74 - 102)  BP: 116/54 (04-25-24 @ 11:29) (105/52 - 125/51)  RR:  (10 - 20)  SpO2:  (99% - 100%)  Wt(kg): --  General: AAO x 3, appropriate mood and affect    Ophthalmology Exam:  Visual acuity (sc): FARAZ 2/2 trach and participation  Pupils: PERRL OU, no APD  Ttono: 21 OD, 23 OS   Extraocular movements (EOMs): FARAZ 2/2 trach and participation  Confrontational Visual Field (CVF): FARAZ 2/2 trach and participation  Color Plates: FARAZ 2/2 trach and participation    Pen Light Exam (PLE)  External: Flat OU  Lids/Lashes/Lacrimal Ducts: Stye RUL OD ; Flat OS  Sclera/Conjunctiva: OD: no injection, temporal cogan plaque ; OS: sectoral injection temporal and inferior, with enlarge subconjunctival vessel  Cornea: OD: 1+ SPK, irregular tear film ; OS: 2+ SPK, irregular tear film  Anterior Chamber: D+F OU    Iris: Flat OU  Lens: PCIOL OD; OS: dense cataract OS

## 2024-04-25 NOTE — CONSULT NOTE ADULT - REASON FOR ADMISSION
Dislodged G Tube

## 2024-04-25 NOTE — CHART NOTE - NSCHARTNOTEFT_GEN_A_CORE
Nutrition  Chart note    Spoke with Case Management, requesting 3-5 days worth of tube feeding for discharge. RD spoke with  of food and nutrition services to request a 3-5 day supply of bottles of [employer] Peptide 1.5 for home for pt. Confirmed.     RD remains available   Rufina Morris MS, RD, CDN / Teams

## 2024-04-25 NOTE — DISCHARGE NOTE NURSING/CASE MANAGEMENT/SOCIAL WORK - PATIENT PORTAL LINK FT
You can access the FollowMyHealth Patient Portal offered by [facility] by registering at the following website: [email or web address]. By joining [facility]’s FollowMyHealth portal, you will also be able to view your health information using other applications (apps) compatible with our system.

## 2024-04-25 NOTE — CHART NOTE - NSCHARTNOTEFT_GEN_A_CORE
ENT     Tracheostomy Tube Exchange     Risks and benefits discussed with pt and her family (who make decisions for her)  verbal consent obtained, from family.  Pt was placed in a supine position with neck extended. A 10 CC syringe used to completely removed any remaining air in the trach cuff.  #8XLT (distal) cuffed tracheostomy tube was removed and replaced with a #8XLT (distal) cuffed tracheostomy tube. Balloon inflated. No bleeding. Clear secretions suctioned from stoma. Patient placed back on vent-   Bedside tracheoscopy performed + Tracheal Malacia noted , nick visualized, no purulence, no erythema Pt tolerated the procedure well without complications.          Silver Nitrate cautery   - Superior stomal granulation tissue identified   - 3 silver nitrate cautery sticks used for cautery ENT     Tracheostomy Tube Exchange     Risks and benefits discussed with pt and her family (who make decisions for her)  verbal consent obtained, from family.  Pt was placed in a supine position with neck extended. A 10 CC syringe used to completely removed any remaining air in the trach cuff.  #8XLT (distal) cuffed tracheostomy tube was removed and replaced with a #8XLT (distal) cuffed tracheostomy tube. Balloon inflated. No bleeding. Clear secretions suctioned from stoma. Patient placed back on vent-   Bedside tracheoscopy performed + Tracheal Malacia noted , nick visualized, no purulence, no erythema Pt tolerated the procedure well without complications.      Silver Nitrate cautery   - Superior stomal granulation tissue identified   - 3 silver nitrate cautery sticks used for cautery

## 2024-04-25 NOTE — CONSULT NOTE ADULT - SUBJECTIVE AND OBJECTIVE BOX
CC: Tracheostomy tube exchange    HPI:  74y Female with PMH of T2DM on insulin, HTN, HLD, hypothyroidism, RA on Prednisone, Fibromyalgia, cerebral aneurysm s/p repair, acute hypercapnic respiratory failure s/p trach (vent dependent) and PEG (10/22), bedbound presented from home with complaints of possible G tube dislodgement and redness around the site, now s/p replacement. 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      H/O tracheostomy      PEG (percutaneous endoscopic gastrostomy) status        Allergies    pineapple (Unknown)  Tagamet (Unknown)  heparin (Unknown)  walnut (Unknown)  metronidazole (Rash)  penicillin (Unknown)  Lyrica (Unknown)  meropenem (Rash)  Pecan, Filbert, Hazelnut (Unknown)    Intolerances      MEDICATIONS  (STANDING):  albuterol/ipratropium for Nebulization 3 milliLiter(s) Nebulizer every 6 hours  amLODIPine   Tablet 10 milliGRAM(s) Oral <User Schedule>  artificial tears (preservative free) Ophthalmic Solution 1 Drop(s) Both EYES every 2 hours  ascorbic acid 500 milliGRAM(s) Oral daily  Biotene Dry Mouth Oral Rinse 5 milliLiter(s) Swish and Spit every 6 hours  chlorhexidine 0.12% Liquid 15 milliLiter(s) Oral Mucosa every 12 hours  chlorhexidine 4% Liquid 1 Application(s) Topical <User Schedule>  cholecalciferol 2000 Unit(s) Oral daily  diclofenac sodium 1% Gel 4 Gram(s) Topical four times a day  erythromycin   Ointment 1 Application(s) Both EYES at bedtime  escitalopram 10 milliGRAM(s) Oral <User Schedule>  gabapentin Solution 100 milliGRAM(s) Oral <User Schedule>  insulin glargine Injectable (LANTUS) 12 Unit(s) SubCutaneous <User Schedule>  insulin regular  human corrective regimen sliding scale   SubCutaneous every 6 hours  insulin regular  human recombinant 7 Unit(s) SubCutaneous <User Schedule>  insulin regular  human recombinant 10 Unit(s) SubCutaneous <User Schedule>  levothyroxine 125 MICROGram(s) Oral daily  lidocaine   4% Patch 1 Patch Transdermal at bedtime  lidocaine   4% Patch 1 Patch Transdermal at bedtime  melatonin Liquid 3 milliGRAM(s) Oral at bedtime  multivitamin/minerals/iron Oral Solution (CENTRUM) 15 milliLiter(s) Oral daily  pantoprazole  Injectable 40 milliGRAM(s) IV Push daily  prednisoLONE acetate 1% Suspension 1 Drop(s) Both EYES four times a day  predniSONE  Solution 5 milliGRAM(s) Oral <User Schedule>  QUEtiapine 12.5 milliGRAM(s) Oral <User Schedule>  simethicone 80 milliGRAM(s) Chew every 6 hours  surgical lubricant sterile 1 Application(s) Topical every 8 hours  vancomycin    Solution 125 milliGRAM(s) Enteral Tube every 12 hours    MEDICATIONS  (PRN):  calamine/zinc oxide Lotion 1 Application(s) Topical daily PRN Rash and/or Itching  ondansetron Injectable 4 milliGRAM(s) IV Push every 8 hours PRN Nausea and/or Vomiting  oxyCODONE    Solution 5 milliGRAM(s) Enteral Tube every 4 hours PRN Moderate Pain (4 - 6)  oxyCODONE    Solution 10 milliGRAM(s) Enteral Tube every 6 hours PRN Severe Pain (7 - 10)  sodium chloride 0.65% Nasal 1 Spray(s) Both Nostrils every 12 hours PRN Congestion      Social History: **??**    Family history: No pertinent family history in first degree relatives    ROS:   ENT: all negative except as noted in HPI   CV: denies palpitations  Pulm: denies SOB, cough, hemoptysis  GI: denies change in appetite, indigestion, n/v  : denies pertinent urinary symptoms, urgency  Neuro: denies numbness/tingling, loss of sensation  Psych: denies anxiety  MS: denies muscle weakness, instability  Heme: denies easy bruising or bleeding  Endo: denies heat/cold intolerance, excessive sweating  Vascular: denies LE edema    Vital Signs Last 24 Hrs  T(C): 36.9 (25 Apr 2024 11:29), Max: 36.9 (24 Apr 2024 18:16)  T(F): 98.5 (25 Apr 2024 11:29), Max: 98.5 (24 Apr 2024 18:16)  HR: 74 (25 Apr 2024 11:32) (74 - 102)  BP: 116/54 (25 Apr 2024 11:29) (105/52 - 125/51)  BP(mean): --  RR: 18 (25 Apr 2024 11:29) (10 - 20)  SpO2: 99% (25 Apr 2024 11:32) (99% - 100%)    Parameters below as of 25 Apr 2024 11:32  Patient On (Oxygen Delivery Method): ventilator                  PHYSICAL EXAM:  Gen: NAD  Skin: No rashes, bruises, or lesions  Head: Normocephalic, Atraumatic  Face: no edema, erythema, or fluctuance. Parotid glands soft without mass  Eyes: no scleral injection  Ears: Right - ear canal clear, TM intact without effusion or erythema. No evidence of any fluid drainage. No mastoid tenderness, erythema, or ear bulging            Left - ear canal clear, TM intact without effusion or erythema. No evidence of any fluid drainage. No mastoid tenderness, erythema, or ear bulging  Nose: Nares bilaterally patent, no discharge  Mouth: No stridor, no drooling, no trismus.  Mucosa moist, tongue/uvula midline, oropharynx clear  Neck: Flat, supple, no lymphadenopathy, trachea midline, no masses  Lymphatic: No lymphadenopathy  Resp: breathing easily, no stridor  CV: no peripheral edema/cyanosis  GI: nondistended   Peripheral vascular: no JVD or edema  Neuro: facial nerve intact, no facial droop      Diagnostic Nasal Endoscopy: (Scope #2 used)    Fiberoptic Indirect laryngoscopy:  (Scope #2 used)    IMAGING/ADDITIONAL STUDIES:  CC: Tracheostomy tube exchange    HPI:  74y Female with PMH of T2DM on insulin, HTN, HLD, hypothyroidism, RA on Prednisone, Fibromyalgia, cerebral aneurysm s/p repair, acute hypercapnic respiratory failure s/p trach (vent dependent) and PEG (10/22), bedbound presented from home with complaints of possible G tube dislodgement and redness around the site, now s/p replacement. ENT consulted for tracheostomy tube exchange. 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      H/O tracheostomy      PEG (percutaneous endoscopic gastrostomy) status        Allergies    pineapple (Unknown)  Tagamet (Unknown)  heparin (Unknown)  walnut (Unknown)  metronidazole (Rash)  penicillin (Unknown)  Lyrica (Unknown)  meropenem (Rash)  Pecan, Filbert, Hazelnut (Unknown)    Intolerances      MEDICATIONS  (STANDING):  albuterol/ipratropium for Nebulization 3 milliLiter(s) Nebulizer every 6 hours  amLODIPine   Tablet 10 milliGRAM(s) Oral <User Schedule>  artificial tears (preservative free) Ophthalmic Solution 1 Drop(s) Both EYES every 2 hours  ascorbic acid 500 milliGRAM(s) Oral daily  Biotene Dry Mouth Oral Rinse 5 milliLiter(s) Swish and Spit every 6 hours  chlorhexidine 0.12% Liquid 15 milliLiter(s) Oral Mucosa every 12 hours  chlorhexidine 4% Liquid 1 Application(s) Topical <User Schedule>  cholecalciferol 2000 Unit(s) Oral daily  diclofenac sodium 1% Gel 4 Gram(s) Topical four times a day  erythromycin   Ointment 1 Application(s) Both EYES at bedtime  escitalopram 10 milliGRAM(s) Oral <User Schedule>  gabapentin Solution 100 milliGRAM(s) Oral <User Schedule>  insulin glargine Injectable (LANTUS) 12 Unit(s) SubCutaneous <User Schedule>  insulin regular  human corrective regimen sliding scale   SubCutaneous every 6 hours  insulin regular  human recombinant 7 Unit(s) SubCutaneous <User Schedule>  insulin regular  human recombinant 10 Unit(s) SubCutaneous <User Schedule>  levothyroxine 125 MICROGram(s) Oral daily  lidocaine   4% Patch 1 Patch Transdermal at bedtime  lidocaine   4% Patch 1 Patch Transdermal at bedtime  melatonin Liquid 3 milliGRAM(s) Oral at bedtime  multivitamin/minerals/iron Oral Solution (CENTRUM) 15 milliLiter(s) Oral daily  pantoprazole  Injectable 40 milliGRAM(s) IV Push daily  prednisoLONE acetate 1% Suspension 1 Drop(s) Both EYES four times a day  predniSONE  Solution 5 milliGRAM(s) Oral <User Schedule>  QUEtiapine 12.5 milliGRAM(s) Oral <User Schedule>  simethicone 80 milliGRAM(s) Chew every 6 hours  surgical lubricant sterile 1 Application(s) Topical every 8 hours  vancomycin    Solution 125 milliGRAM(s) Enteral Tube every 12 hours    MEDICATIONS  (PRN):  calamine/zinc oxide Lotion 1 Application(s) Topical daily PRN Rash and/or Itching  ondansetron Injectable 4 milliGRAM(s) IV Push every 8 hours PRN Nausea and/or Vomiting  oxyCODONE    Solution 5 milliGRAM(s) Enteral Tube every 4 hours PRN Moderate Pain (4 - 6)  oxyCODONE    Solution 10 milliGRAM(s) Enteral Tube every 6 hours PRN Severe Pain (7 - 10)  sodium chloride 0.65% Nasal 1 Spray(s) Both Nostrils every 12 hours PRN Congestion           Family history: No pertinent family history in first degree relatives         Vital Signs Last 24 Hrs  T(C): 36.9 (25 Apr 2024 11:29), Max: 36.9 (24 Apr 2024 18:16)  T(F): 98.5 (25 Apr 2024 11:29), Max: 98.5 (24 Apr 2024 18:16)  HR: 74 (25 Apr 2024 11:32) (74 - 102)  BP: 116/54 (25 Apr 2024 11:29) (105/52 - 125/51)  RR: 18 (25 Apr 2024 11:29) (10 - 20)  SpO2: 99% (25 Apr 2024 11:32) (99% - 100%)    Parameters below as of 25 Apr 2024 11:32  Patient On (Oxygen Delivery Method): ventilator                  PHYSICAL EXAM:  Gen: NAD  Skin: No rashes, bruises, or lesions  Head: Normocephalic, Atraumatic  Face: no edema, erythema, or fluctuance.   Eyes: no scleral injection  Nose: Nares bilaterally patent, no discharge  Mouth: No stridor, no drooling, no trismus.   Neck: granulation superior stoma--  silver nitrated  applied--  8XLT Distal Velcro in place balloon inflated     Lymphatic: No lymphadenopathy  Resp: breathing easily, no stridor  CV: no peripheral edema/cyanosis  GI: nondistended       Diagnostic Nasal Endoscopy: (Scope #2 used)    Fiberoptic Indirect laryngoscopy:  (Scope #2 used)    IMAGING/ADDITIONAL STUDIES:  CC: Tracheostomy tube exchange    HPI:  74y Female with PMH of T2DM on insulin, HTN, HLD, hypothyroidism, RA on Prednisone, Fibromyalgia, cerebral aneurysm s/p repair, acute hypercapnic respiratory failure s/p trach (vent dependent) and PEG (10/22), bedbound presented from home with complaints of possible G tube dislodgement and redness around the site, now s/p replacement. ENT consulted for tracheostomy tube exchange. 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      H/O tracheostomy      PEG (percutaneous endoscopic gastrostomy) status        Allergies    pineapple (Unknown)  Tagamet (Unknown)  heparin (Unknown)  walnut (Unknown)  metronidazole (Rash)  penicillin (Unknown)  Lyrica (Unknown)  meropenem (Rash)  Pecan, Filbert, Hazelnut (Unknown)    Intolerances      MEDICATIONS  (STANDING):  albuterol/ipratropium for Nebulization 3 milliLiter(s) Nebulizer every 6 hours  amLODIPine   Tablet 10 milliGRAM(s) Oral <User Schedule>  artificial tears (preservative free) Ophthalmic Solution 1 Drop(s) Both EYES every 2 hours  ascorbic acid 500 milliGRAM(s) Oral daily  Biotene Dry Mouth Oral Rinse 5 milliLiter(s) Swish and Spit every 6 hours  chlorhexidine 0.12% Liquid 15 milliLiter(s) Oral Mucosa every 12 hours  chlorhexidine 4% Liquid 1 Application(s) Topical <User Schedule>  cholecalciferol 2000 Unit(s) Oral daily  diclofenac sodium 1% Gel 4 Gram(s) Topical four times a day  erythromycin   Ointment 1 Application(s) Both EYES at bedtime  escitalopram 10 milliGRAM(s) Oral <User Schedule>  gabapentin Solution 100 milliGRAM(s) Oral <User Schedule>  insulin glargine Injectable (LANTUS) 12 Unit(s) SubCutaneous <User Schedule>  insulin regular  human corrective regimen sliding scale   SubCutaneous every 6 hours  insulin regular  human recombinant 7 Unit(s) SubCutaneous <User Schedule>  insulin regular  human recombinant 10 Unit(s) SubCutaneous <User Schedule>  levothyroxine 125 MICROGram(s) Oral daily  lidocaine   4% Patch 1 Patch Transdermal at bedtime  lidocaine   4% Patch 1 Patch Transdermal at bedtime  melatonin Liquid 3 milliGRAM(s) Oral at bedtime  multivitamin/minerals/iron Oral Solution (CENTRUM) 15 milliLiter(s) Oral daily  pantoprazole  Injectable 40 milliGRAM(s) IV Push daily  prednisoLONE acetate 1% Suspension 1 Drop(s) Both EYES four times a day  predniSONE  Solution 5 milliGRAM(s) Oral <User Schedule>  QUEtiapine 12.5 milliGRAM(s) Oral <User Schedule>  simethicone 80 milliGRAM(s) Chew every 6 hours  surgical lubricant sterile 1 Application(s) Topical every 8 hours  vancomycin    Solution 125 milliGRAM(s) Enteral Tube every 12 hours    MEDICATIONS  (PRN):  calamine/zinc oxide Lotion 1 Application(s) Topical daily PRN Rash and/or Itching  ondansetron Injectable 4 milliGRAM(s) IV Push every 8 hours PRN Nausea and/or Vomiting  oxyCODONE    Solution 5 milliGRAM(s) Enteral Tube every 4 hours PRN Moderate Pain (4 - 6)  oxyCODONE    Solution 10 milliGRAM(s) Enteral Tube every 6 hours PRN Severe Pain (7 - 10)  sodium chloride 0.65% Nasal 1 Spray(s) Both Nostrils every 12 hours PRN Congestion           Family history: No pertinent family history in first degree relatives         Vital Signs Last 24 Hrs  T(C): 36.9 (25 Apr 2024 11:29), Max: 36.9 (24 Apr 2024 18:16)  T(F): 98.5 (25 Apr 2024 11:29), Max: 98.5 (24 Apr 2024 18:16)  HR: 74 (25 Apr 2024 11:32) (74 - 102)  BP: 116/54 (25 Apr 2024 11:29) (105/52 - 125/51)  RR: 18 (25 Apr 2024 11:29) (10 - 20)  SpO2: 99% (25 Apr 2024 11:32) (99% - 100%)    Parameters below as of 25 Apr 2024 11:32  Patient On (Oxygen Delivery Method): ventilator                  PHYSICAL EXAM:  Gen: NAD  Skin: No rashes, bruises, or lesions  Head: Normocephalic, Atraumatic  Face: no edema, erythema, or fluctuance.   Eyes: no scleral injection  Nose: Nares bilaterally patent, no discharge  Mouth: No stridor, no drooling, no trismus.   Neck: granulation superior stoma--  silver nitrated  applied--  8XLT Distal Velcro in place balloon inflated   Lymphatic: No lymphadenopathy  Resp: breathing easily   CV: no peripheral edema/cyanosis  GI: nondistended        Tracheoscopy  Scope advanced through trach tube.  No bleeding-  Tracheal malacia identified -- Trach tube in proper position   Carinal bifurcation visualized.

## 2024-04-25 NOTE — CONSULT NOTE ADULT - NS ATTEND AMEND GEN_ALL_CORE FT
ENT consulted for airway evaluation and trach change    74y Female with PMH of T2DM on insulin, HTN, HLD, hypothyroidism, RA on Prednisone, Fibromyalgia, cerebral aneurysm s/p repair, acute hypercapnic respiratory failure s/p trach (vent dependent) and PEG (10/22), bedbound presented from home with complaints of possible G tube dislodgement and redness around the site, now s/p replacement. Patient is now s/p tracheosotmy tube exchange from 8XLT (distal) cuffed to an 8XLT(distal) cuffed and silver nitrate cautery of granulation tissue (superior to stoma)    Recommend:  Granulation Tissue  - Tracheostomy tube changed - 8XLT (distal) cuffed   - Keep trach sponge between stoma and trach to prevent granulation tissue from forming  - All questions answered   - No further ENT interventions ENT consulted for airway evaluation and trach change    74y Female with PMH of T2DM on insulin, HTN, HLD, hypothyroidism, RA on Prednisone, Fibromyalgia, cerebral aneurysm s/p repair, acute hypercapnic respiratory failure s/p trach (vent dependent) and PEG (10/22), bedbound presented from home with complaints of possible G tube dislodgement and redness around the site, now s/p replacement. Patient is now s/p tracheosotmy tube exchange from 8XLT (distal) cuffed to an 8XLT(distal) cuffed and silver nitrate cautery of granulation tissue (superior to stoma)    Bedside tracheoscopy shows tracheomalacia nick visualized, no purulence, no erythema Pt tolerated the procedure well without complications.    Recommend:  Granulation Tissue  - Tracheostomy tube changed - 8XLT (distal) cuffed   - Keep trach sponge between stoma and trach to prevent granulation tissue from forming  - All questions answered   - No further ENT interventions

## 2024-04-25 NOTE — CONSULT NOTE ADULT - ASSESSMENT
4y Female with PMH of T2DM on insulin, HTN, HLD, hypothyroidism, RA on Prednisone, Fibromyalgia, cerebral aneurysm s/p repair, acute hypercapnic respiratory failure s/p trach (vent dependent) and PEG (10/22), bedbound presented from home with complaints of possible G tube dislodgement and redness around the site, now s/p replacement. Patient is now s/p tracheosotmy tube exchange from 8XLT (distal) cuffed to an 8XLT(distal) cuffed and silver nitrate cautery of granulation tissue (superior to stoma)  74y Female with PMH of T2DM on insulin, HTN, HLD, hypothyroidism, RA on Prednisone, Fibromyalgia, cerebral aneurysm s/p repair, acute hypercapnic respiratory failure s/p trach (vent dependent) and PEG (10/22), bedbound presented from home with complaints of possible G tube dislodgement and redness around the site, now s/p replacement. Patient is now s/p tracheosotmy tube exchange from 8XLT (distal) cuffed to an 8XLT(distal) cuffed and silver nitrate cautery of granulation tissue (superior to stoma)

## 2024-04-25 NOTE — PROGRESS NOTE ADULT - ASSESSMENT
73yo woman  h/o T2DM (4/4/24: A1C = 6.2%), HTN, HLD, anemia, hypothyroidism, RA, fibromyalgia, remote cerebral aneurysm repair, acute hypercapnic respiratory failure with tracheostomy, PEG tube (initially placed 2022), and bedbound, recurrent C diff presented for PEG tube dislodgment. Admitted 4/2/24  weight = 54.5 kg    Recurrent C diff - first episode Aug 2023 treated with tapering PO vanco, recurrent episode 1/31/24 treated with PO vanco and then fidaxomicin completed in Feb - most recently tested positive 3/20/24 seen by Dr. Newman 3/29 outpatient, started on fidaxomicin that day - tube feeds concurrently held, unclear if improving afterwards per family  Chronic sacral decubitus wound  3/20 Klebisella bacteruria R only to amp and sputum few Pseudomonas R to FQs w/o polys on gram stian - treatment of urine was deferred to avoid exacerbating C diff    Tmax 100, no leukocytosis  Concern for cellulitis around PEG tube site - area with very minimal erythema, remarkable receded from skin norm placed on admission  UA 11 WBC  CT a/p: no infectious focus or colitis  MRSA PCR+    4/3 IR - PEG exchanged bedside to 20 Fr [employer]  - Bedside XR demonstrates appropriately positioned G-tube with contrast filling into the stomach and bowel.    4/2 Blood Cultures x 2 NGTD;  Positive MRSA/MSSA PCR  4/3 Urine cultures NEG  4/4 fever  4/4 Wound care assessment appreciated:   " area of persistent nonblanchable dark discoloration that is inconsistent with a patient's surrounding skin tone as well as a white juan j film noted over B/L buttocks/sacral skin, area measures approximately 10cm x 10cm x 0cm- presentation is consistent with a deep tissue injury in evolution with incontinence and fungal involvement present on admission. Within the apex of the gluteal cleft/base of sacrum there is full thickness skin loss with red, beefy tissue noted, area measures approximately 3cm x 1cm x 0.3cm- presentation is consistent with a deep tissue injury in evolution with incontinence involvement present on admission."  4/7 fever; Meropenem resumed  Positive Blood Culture x1 Serratia marcesens    4/16 OR: Gastrojejunostomy, percutaneous, endoscopic, Endoscopic assisted closure of PEG site. Endoscopic assisted percutaneous gastrojejunostomy tube placement.   4/19 K= 4.4  4/22 feeds @ 55 cc/hr  4/24 continued frequent bowel movements  - stool partially formed  4/25 Ophthalmology follow up: anterior scleritis OU  - Pt with hx of RA and sjogrens has bilateral scleral injection that is sectoral, no signs of conjunctivitis; ENT for trach exchange    Antibiotics  Meropenem 4/2 -->4/3; 4/7 --> 4/11  Cefepime 4/11 --> 4/14  IV Vanco 4/2  Fdaxomicin 4/3--> 4/8  PO Vanco 4/8 -->     Suggest:  maintain fluid and electrolytes with increased diarrhea -  Continue po Vanco  Extended po Vancomycin taper  Bezlotoxumab (Zinplava) at end of Vanco course as out pt    Vanco dosing schedule  Vancomycin 125 mg po 4 times daily 4/8 --> 4/18  Vancomycin 125 mg po 2 times daily 4/19 --> 4/25  Vancomycin 125 mg po once daily 4/26 -->5/2  Vancomycin 125 mg po once every other day 5/3 --> 5/9  Vancomycin 125 mg po once every third day 5/10 --> 5/23/24    Given multiple recurrences in context of advanced age, debility and multiple co-morbidities, administration of Bezlotoxumab (Zinplava) 500 mg over 1 hours is indicated to reduce future recurrences  Ref: Norm Wheeler M.D., Rojas Contreras M.D., Scott Griffith, Ph.D., Shiva Abraham M.D., Gabriel Falcon D.O., Pedro Weiss M.D., Talib Hampton M.D., Sena Hughes M.D., Raimundo Marte M.D., Melissa Soria M.D., Piyush Lamas M.D., Eliceo Romero M.D., Neyda Xie M.D., Jude Peters M.D., Neelima Vela B.S.M.T., Hannah Cardenas, Ph.D., Ana Quiroz, B.S., Obie Chirinos, M.S., Khushbu Flores M.D., Jean Carlos Ayala M.D., and Kristin Lopez, Ph.D., for the MOD[employer] I and MODIFY II Investigators*    Bezlotoxumab is suggested in IDSA guidelines -  Ref: Clinical Practice Guidelines for the Management of Clostridioides difficile Infection in Adults: 2021 Update by SHEA/IDSA  Published VERNA, 6/14/2021; Clinical Infectious Diseases, tyet079, https://doi.org/10.1093/verna/igcz555    Zinplava to be administered at home at end of vanco taper around 5/23/24

## 2024-04-25 NOTE — PROGRESS NOTE ADULT - SUBJECTIVE AND OBJECTIVE BOX
Patient is a 74y old  Female who presents with a chief complaint of Dislodged G Tube (24 Apr 2024 20:02)      Interval Events:    REVIEW OF SYSTEMS:  [ ] Positive  [ ] All other systems negative  [ ] Unable to assess ROS because ________    Vital Signs Last 24 Hrs  T(C): 36.8 (04-25-24 @ 06:00), Max: 36.9 (04-24-24 @ 18:16)  T(F): 98.3 (04-25-24 @ 06:00), Max: 98.5 (04-24-24 @ 18:16)  HR: 83 (04-25-24 @ 06:00) (69 - 102)  BP: 125/51 (04-25-24 @ 06:00) (105/52 - 125/51)  RR: 18 (04-25-24 @ 06:00) (10 - 20)  SpO2: 100% (04-25-24 @ 06:00) (100% - 100%)    PHYSICAL EXAM:  HEENT:   [ ]Tracheostomy:  [ ]Pupils equal  [ ]No oral lesions  [ ]Abnormal    SKIN  [ ]No Rash  [ ] Abnormal  [ ] pressure    CARDIAC  [ ]Regular  [ ]Abnormal    PULMONARY  [ ]Bilateral Clear Breath Sounds  [ ]Normal Excursion  [ ]Abnormal    GI  [ ]PEG      [ ] +BS		              [ ]Soft, nondistended, nontender	  [ ]Abnormal    MUSCULOSKELETAL                                   [ ]Bedbound                 [ ]Abnormal    [ ]Ambulatory/OOB to chair                           EXTREMITIES                                         [ ]Normal  [ ]Edema                           NEUROLOGIC  [ ] Normal, non focal  [ ] Focal findings:    PSYCHIATRIC  [ ]Alert and appropriate  [ ] Sedated	 [ ]Agitated    :  Mcbride: [ ] Yes, if yes: Date of Placement:                   [  ] No    LINES: Central Lines [ ] Yes, if yes: Date of Placement                                     [  ] No    HOSPITAL MEDICATIONS:  MEDICATIONS  (STANDING):  albuterol/ipratropium for Nebulization 3 milliLiter(s) Nebulizer every 6 hours  amLODIPine   Tablet 10 milliGRAM(s) Oral <User Schedule>  artificial tears (preservative free) Ophthalmic Solution 1 Drop(s) Both EYES four times a day  ascorbic acid 500 milliGRAM(s) Oral daily  Biotene Dry Mouth Oral Rinse 5 milliLiter(s) Swish and Spit every 6 hours  chlorhexidine 0.12% Liquid 15 milliLiter(s) Oral Mucosa every 12 hours  chlorhexidine 4% Liquid 1 Application(s) Topical <User Schedule>  cholecalciferol 2000 Unit(s) Oral daily  diclofenac sodium 1% Gel 4 Gram(s) Topical four times a day  erythromycin   Ointment 1 Application(s) Both EYES three times a day  escitalopram 10 milliGRAM(s) Oral <User Schedule>  fentaNYL   Patch  25 MICROgram(s)/Hr 1 Patch Transdermal every 72 hours  gabapentin Solution 100 milliGRAM(s) Oral <User Schedule>  insulin glargine Injectable (LANTUS) 12 Unit(s) SubCutaneous <User Schedule>  insulin regular  human corrective regimen sliding scale   SubCutaneous every 6 hours  insulin regular  human recombinant 7 Unit(s) SubCutaneous <User Schedule>  levothyroxine 125 MICROGram(s) Oral daily  lidocaine   4% Patch 1 Patch Transdermal at bedtime  lidocaine   4% Patch 1 Patch Transdermal at bedtime  melatonin Liquid 3 milliGRAM(s) Oral at bedtime  multivitamin/minerals/iron Oral Solution (CENTRUM) 15 milliLiter(s) Oral daily  pantoprazole  Injectable 40 milliGRAM(s) IV Push daily  prednisoLONE acetate 1% Suspension 1 Drop(s) Both EYES four times a day  predniSONE  Solution 5 milliGRAM(s) Oral <User Schedule>  QUEtiapine 12.5 milliGRAM(s) Oral <User Schedule>  simethicone 80 milliGRAM(s) Chew every 6 hours  surgical lubricant sterile 1 Application(s) Topical every 8 hours  vancomycin    Solution 125 milliGRAM(s) Enteral Tube every 12 hours    MEDICATIONS  (PRN):  calamine/zinc oxide Lotion 1 Application(s) Topical daily PRN Rash and/or Itching  ondansetron Injectable 4 milliGRAM(s) IV Push every 8 hours PRN Nausea and/or Vomiting  oxyCODONE    Solution 5 milliGRAM(s) Enteral Tube every 4 hours PRN Moderate Pain (4 - 6)  oxyCODONE    Solution 10 milliGRAM(s) Enteral Tube every 6 hours PRN Severe Pain (7 - 10)  sodium chloride 0.65% Nasal 1 Spray(s) Both Nostrils every 12 hours PRN Congestion      LABS:                  CAPILLARY BLOOD GLUCOSE    MICROBIOLOGY:     RADIOLOGY:  [ ] Reviewed and interpreted by me    Mode: AC/ CMV (Assist Control/ Continuous Mandatory Ventilation)  RR (machine): 12  TV (machine): 350  FiO2: 30  PEEP: 5  ITime: 1  MAP: 11  PIP: 22   Patient is a 74y old  Female who presents with a chief complaint of Dislodged G Tube (24 Apr 2024 20:02)      Interval Events: No events reported.              REVIEW OF SYSTEMS:  [ ] Positive  [ ] All other systems negative  [X] Unable to assess ROS because ____Arousable but not answering questions during exam.    Vital Signs Last 24 Hrs  T(C): 36.8 (04-25-24 @ 06:00), Max: 36.9 (04-24-24 @ 18:16)  T(F): 98.3 (04-25-24 @ 06:00), Max: 98.5 (04-24-24 @ 18:16)  HR: 83 (04-25-24 @ 06:00) (69 - 102)  BP: 125/51 (04-25-24 @ 06:00) (105/52 - 125/51)  RR: 18 (04-25-24 @ 06:00) (10 - 20)  SpO2: 100% (04-25-24 @ 06:00) (100% - 100%)      PHYSICAL EXAM:  HEENT:   [X] Tracheostomy:  #8 distal XLT Cuffed Shiley  [X] PERRL B/L; EOMI; eyes red L>R  [X] No oral lesions  [ ] Abnormal    SKIN  [ ] No Rash  [ ] Abnormal  [X] pressure: B/L buttocks/sacral deep tissue injury     CARDIAC  [X] Regular  [ ]Abnormal    PULMONARY  [X] Bilateral Clear Breath Sounds  [ ] Normal Excursion  [ ] Abnormal    GI  [X] PEG-J      [X] +BS		              [X] Soft, nondistended, nontender	  [X] Abnormal:  Original PEG stoma appears to be healing and smaller with mild amount of clear viscous discharge, site otherwise clean without erythema or purulence    MUSCULOSKELETAL                                   [X] Bedbound                 [ ] Abnormal    [ ] Ambulatory/OOB to chair                           EXTREMITIES                                         [X] Normal  [ ] Edema                       NEUROLOGIC  [ ] Normal, non focal  [X] Focal findings:  Alert and Oriented x3; responds to questions by mouthing words; +gag reflex/cough; no facial asymmetry observed; moves all distal limbs upon request; B/L plantar flexion    PSYCHIATRIC  [X] Lethargic but arousable, affect appropriate  [ ] Sedated	 [ ]Agitated    :  Mcbride: [ ] Yes, if yes: Date of Placement:                   [X ] No    LINES: Central Lines [ ] Yes, if yes: Date of Placement                                     [X] No      HOSPITAL MEDICATIONS:  MEDICATIONS  (STANDING):  albuterol/ipratropium for Nebulization 3 milliLiter(s) Nebulizer every 6 hours  amLODIPine   Tablet 10 milliGRAM(s) Oral <User Schedule>  artificial tears (preservative free) Ophthalmic Solution 1 Drop(s) Both EYES four times a day  ascorbic acid 500 milliGRAM(s) Oral daily  Biotene Dry Mouth Oral Rinse 5 milliLiter(s) Swish and Spit every 6 hours  chlorhexidine 0.12% Liquid 15 milliLiter(s) Oral Mucosa every 12 hours  chlorhexidine 4% Liquid 1 Application(s) Topical <User Schedule>  cholecalciferol 2000 Unit(s) Oral daily  diclofenac sodium 1% Gel 4 Gram(s) Topical four times a day  erythromycin   Ointment 1 Application(s) Both EYES three times a day  escitalopram 10 milliGRAM(s) Oral <User Schedule>  fentaNYL   Patch  25 MICROgram(s)/Hr 1 Patch Transdermal every 72 hours  gabapentin Solution 100 milliGRAM(s) Oral <User Schedule>  insulin glargine Injectable (LANTUS) 12 Unit(s) SubCutaneous <User Schedule>  insulin regular  human corrective regimen sliding scale   SubCutaneous every 6 hours  insulin regular  human recombinant 7 Unit(s) SubCutaneous <User Schedule>  levothyroxine 125 MICROGram(s) Oral daily  lidocaine   4% Patch 1 Patch Transdermal at bedtime  lidocaine   4% Patch 1 Patch Transdermal at bedtime  melatonin Liquid 3 milliGRAM(s) Oral at bedtime  multivitamin/minerals/iron Oral Solution (CENTRUM) 15 milliLiter(s) Oral daily  pantoprazole  Injectable 40 milliGRAM(s) IV Push daily  prednisoLONE acetate 1% Suspension 1 Drop(s) Both EYES four times a day  predniSONE  Solution 5 milliGRAM(s) Oral <User Schedule>  QUEtiapine 12.5 milliGRAM(s) Oral <User Schedule>  simethicone 80 milliGRAM(s) Chew every 6 hours  surgical lubricant sterile 1 Application(s) Topical every 8 hours  vancomycin    Solution 125 milliGRAM(s) Enteral Tube every 12 hours    MEDICATIONS  (PRN):  calamine/zinc oxide Lotion 1 Application(s) Topical daily PRN Rash and/or Itching  ondansetron Injectable 4 milliGRAM(s) IV Push every 8 hours PRN Nausea and/or Vomiting  oxyCODONE    Solution 5 milliGRAM(s) Enteral Tube every 4 hours PRN Moderate Pain (4 - 6)  oxyCODONE    Solution 10 milliGRAM(s) Enteral Tube every 6 hours PRN Severe Pain (7 - 10)  sodium chloride 0.65% Nasal 1 Spray(s) Both Nostrils every 12 hours PRN Congestion      LABS:                  CAPILLARY BLOOD GLUCOSE    MICROBIOLOGY:     RADIOLOGY:  [ ] Reviewed and interpreted by me    Mode: AC/ CMV (Assist Control/ Continuous Mandatory Ventilation)  RR (machine): 12  TV (machine): 350  FiO2: 30  PEEP: 5  ITime: 1  MAP: 11  PIP: 22

## 2024-04-25 NOTE — PROGRESS NOTE ADULT - NS ATTEND AMEND GEN_ALL_CORE FT
Agree with above  74F PMH RA, cerebellar aneurysm s/p repair p/w dislodged PEG tube. Now with G-J tube, hospital course c/b CDAD,   - Waxing and waning mental status, less alert today, may be tired.  - Trache, on full vent 12/350/30%/+5, attempting PST, albeit 15/5  - Previously occasional leaking of contents from PEG tube site, now appears to have resolved. Minimal drainage from prior PEG site. JG tube in good position with no issues. Tolerating tube feeds.  - Monitoring off antibiotics (s/p cefepime for Serratia bacteremia) other than PO vancomycin taper for recurrent episode CDAD. Plan for dose of bezlotoxumab O/P after D/C.  - s/p FEEST, strict recommendations for pleasure feeding.  - Dispo: patient's daughter completing plans for discharge, social work setting up all the necessary equipment needed for discharge home.   - I had a discussion with the daughter today about general goals of care. Daughter may be learning towards DNH, and even possibly taking patient off the ventilator. However, she is conflicted about this, and in the interim patient will remain DNR but still on vent.

## 2024-04-25 NOTE — DISCHARGE NOTE NURSING/CASE MANAGEMENT/SOCIAL WORK - NSDCVIVACCINE_GEN_ALL_CORE_FT
influenza, injectable, quadrivalent, preservative free; 17-Jan-2024 11:18; Tess Fields (HANNAH); [employer]; [id number] (Exp. Date: 17-Dec-2024); IntraMuscular; Deltoid Left.; 0.5 milliLiter(s); VIS (VIS Published: 06-Aug-2021, VIS Presented: 17-Jan-2024);

## 2024-04-26 ENCOUNTER — TRANSCRIPTION ENCOUNTER (OUTPATIENT)
Age: 74
End: 2024-04-26

## 2024-04-26 DIAGNOSIS — Z93.0 TRACHEOSTOMY STATUS: ICD-10-CM

## 2024-04-26 LAB
GLUCOSE BLDC GLUCOMTR-MCNC: 115 MG/DL — HIGH (ref 70–99)
GLUCOSE BLDC GLUCOMTR-MCNC: 186 MG/DL — HIGH (ref 70–99)
GLUCOSE BLDC GLUCOMTR-MCNC: 188 MG/DL — HIGH (ref 70–99)
GLUCOSE BLDC GLUCOMTR-MCNC: 291 MG/DL — HIGH (ref 70–99)

## 2024-04-26 PROCEDURE — 99232 SBSQ HOSP IP/OBS MODERATE 35: CPT

## 2024-04-26 PROCEDURE — 99232 SBSQ HOSP IP/OBS MODERATE 35: CPT | Mod: FS

## 2024-04-26 RX ORDER — OXYCODONE HYDROCHLORIDE 5 MG/1
5 TABLET ORAL
Qty: 140 | Refills: 0
Start: 2024-04-26 | End: 2024-05-02

## 2024-04-26 RX ORDER — INSULIN HUMAN 100 [IU]/ML
9 INJECTION, SOLUTION SUBCUTANEOUS
Refills: 0 | Status: DISCONTINUED | OUTPATIENT
Start: 2024-04-26 | End: 2024-05-01

## 2024-04-26 RX ORDER — GABAPENTIN 400 MG/1
100 CAPSULE ORAL
Qty: 500 | Refills: 4
Start: 2024-04-26 | End: 2024-09-22

## 2024-04-26 RX ORDER — HYDROMORPHONE HYDROCHLORIDE 2 MG/ML
0.5 INJECTION INTRAMUSCULAR; INTRAVENOUS; SUBCUTANEOUS ONCE
Refills: 0 | Status: DISCONTINUED | OUTPATIENT
Start: 2024-04-26 | End: 2024-04-26

## 2024-04-26 RX ORDER — ACETAMINOPHEN 500 MG
30 TABLET ORAL
Qty: 3600 | Refills: 3
Start: 2024-04-26 | End: 2024-08-23

## 2024-04-26 RX ORDER — INSULIN HUMAN 100 [IU]/ML
7 INJECTION, SOLUTION SUBCUTANEOUS
Refills: 0 | Status: DISCONTINUED | OUTPATIENT
Start: 2024-04-26 | End: 2024-05-01

## 2024-04-26 RX ORDER — FENTANYL CITRATE 50 UG/ML
1 INJECTION INTRAVENOUS
Qty: 1 | Refills: 0
Start: 2024-04-26 | End: 2024-05-10

## 2024-04-26 RX ORDER — LACTOBACILLUS ACIDOPHILUS 100MM CELL
1 CAPSULE ORAL DAILY
Refills: 0 | Status: DISCONTINUED | OUTPATIENT
Start: 2024-04-26 | End: 2024-05-01

## 2024-04-26 RX ORDER — QUETIAPINE FUMARATE 200 MG/1
0.5 TABLET, FILM COATED ORAL
Qty: 15 | Refills: 3
Start: 2024-04-26 | End: 2024-08-23

## 2024-04-26 RX ORDER — INSULIN HUMAN 100 [IU]/ML
9 INJECTION, SOLUTION SUBCUTANEOUS
Refills: 0 | Status: DISCONTINUED | OUTPATIENT
Start: 2024-04-27 | End: 2024-04-27

## 2024-04-26 RX ORDER — LANOLIN ALCOHOL/MO/W.PET/CERES
12 CREAM (GRAM) TOPICAL
Qty: 360 | Refills: 3
Start: 2024-04-26 | End: 2024-08-23

## 2024-04-26 RX ORDER — ACETAMINOPHEN 500 MG
1000 TABLET ORAL EVERY 6 HOURS
Refills: 0 | Status: DISCONTINUED | OUTPATIENT
Start: 2024-04-26 | End: 2024-05-01

## 2024-04-26 RX ORDER — ESCITALOPRAM OXALATE 10 MG/1
1 TABLET, FILM COATED ORAL
Qty: 30 | Refills: 3
Start: 2024-04-26 | End: 2024-08-23

## 2024-04-26 RX ORDER — OXYCODONE HYDROCHLORIDE 5 MG/1
10 TABLET ORAL
Qty: 280 | Refills: 0
Start: 2024-04-26 | End: 2024-05-02

## 2024-04-26 RX ORDER — SACCHAROMYCES BOULARDII 250 MG
250 POWDER IN PACKET (EA) ORAL
Refills: 0 | Status: DISCONTINUED | OUTPATIENT
Start: 2024-04-26 | End: 2024-04-26

## 2024-04-26 RX ADMIN — CHLORHEXIDINE GLUCONATE 15 MILLILITER(S): 213 SOLUTION TOPICAL at 17:53

## 2024-04-26 RX ADMIN — INSULIN HUMAN 2: 100 INJECTION, SOLUTION SUBCUTANEOUS at 17:52

## 2024-04-26 RX ADMIN — LIDOCAINE 1 PATCH: 4 CREAM TOPICAL at 21:52

## 2024-04-26 RX ADMIN — LIDOCAINE 1 PATCH: 4 CREAM TOPICAL at 07:00

## 2024-04-26 RX ADMIN — DICLOFENAC SODIUM 4 GRAM(S): 30 GEL TOPICAL at 17:55

## 2024-04-26 RX ADMIN — INSULIN HUMAN 6: 100 INJECTION, SOLUTION SUBCUTANEOUS at 12:01

## 2024-04-26 RX ADMIN — Medication 1000 MILLIGRAM(S): at 21:51

## 2024-04-26 RX ADMIN — LIDOCAINE 1 PATCH: 4 CREAM TOPICAL at 10:00

## 2024-04-26 RX ADMIN — SIMETHICONE 80 MILLIGRAM(S): 80 TABLET, CHEWABLE ORAL at 12:00

## 2024-04-26 RX ADMIN — Medication 5 MILLILITER(S): at 06:17

## 2024-04-26 RX ADMIN — GABAPENTIN 100 MILLIGRAM(S): 400 CAPSULE ORAL at 21:51

## 2024-04-26 RX ADMIN — Medication 5 MILLILITER(S): at 17:53

## 2024-04-26 RX ADMIN — ESCITALOPRAM OXALATE 10 MILLIGRAM(S): 10 TABLET, FILM COATED ORAL at 17:54

## 2024-04-26 RX ADMIN — Medication 1 DROP(S): at 12:01

## 2024-04-26 RX ADMIN — Medication 500 MILLIGRAM(S): at 12:00

## 2024-04-26 RX ADMIN — Medication 3 MILLILITER(S): at 17:21

## 2024-04-26 RX ADMIN — Medication 1 APPLICATION(S): at 21:54

## 2024-04-26 RX ADMIN — Medication 1 DROP(S): at 17:54

## 2024-04-26 RX ADMIN — Medication 2000 UNIT(S): at 12:00

## 2024-04-26 RX ADMIN — Medication 3 MILLILITER(S): at 23:45

## 2024-04-26 RX ADMIN — Medication 1 DROP(S): at 10:45

## 2024-04-26 RX ADMIN — PANTOPRAZOLE SODIUM 40 MILLIGRAM(S): 20 TABLET, DELAYED RELEASE ORAL at 12:00

## 2024-04-26 RX ADMIN — Medication 1 APPLICATION(S): at 16:57

## 2024-04-26 RX ADMIN — Medication 1 DROP(S): at 21:59

## 2024-04-26 RX ADMIN — SIMETHICONE 80 MILLIGRAM(S): 80 TABLET, CHEWABLE ORAL at 17:54

## 2024-04-26 RX ADMIN — CHLORHEXIDINE GLUCONATE 15 MILLILITER(S): 213 SOLUTION TOPICAL at 06:17

## 2024-04-26 RX ADMIN — QUETIAPINE FUMARATE 12.5 MILLIGRAM(S): 200 TABLET, FILM COATED ORAL at 16:55

## 2024-04-26 RX ADMIN — DICLOFENAC SODIUM 4 GRAM(S): 30 GEL TOPICAL at 12:01

## 2024-04-26 RX ADMIN — Medication 125 MILLIGRAM(S): at 11:59

## 2024-04-26 RX ADMIN — AMLODIPINE BESYLATE 10 MILLIGRAM(S): 2.5 TABLET ORAL at 08:47

## 2024-04-26 RX ADMIN — Medication 1 DROP(S): at 16:56

## 2024-04-26 RX ADMIN — DICLOFENAC SODIUM 4 GRAM(S): 30 GEL TOPICAL at 06:21

## 2024-04-26 RX ADMIN — Medication 1000 MILLIGRAM(S): at 09:55

## 2024-04-26 RX ADMIN — CHLORHEXIDINE GLUCONATE 1 APPLICATION(S): 213 SOLUTION TOPICAL at 06:21

## 2024-04-26 RX ADMIN — Medication 125 MICROGRAM(S): at 06:18

## 2024-04-26 RX ADMIN — GABAPENTIN 100 MILLIGRAM(S): 400 CAPSULE ORAL at 08:46

## 2024-04-26 RX ADMIN — Medication 1 DROP(S): at 17:55

## 2024-04-26 RX ADMIN — SIMETHICONE 80 MILLIGRAM(S): 80 TABLET, CHEWABLE ORAL at 06:18

## 2024-04-26 RX ADMIN — INSULIN HUMAN 0: 100 INJECTION, SOLUTION SUBCUTANEOUS at 06:19

## 2024-04-26 RX ADMIN — Medication 5 MILLILITER(S): at 11:59

## 2024-04-26 RX ADMIN — Medication 1 DROP(S): at 16:55

## 2024-04-26 RX ADMIN — Medication 1 APPLICATION(S): at 06:20

## 2024-04-26 RX ADMIN — Medication 3 MILLILITER(S): at 06:34

## 2024-04-26 RX ADMIN — Medication 5 MILLIGRAM(S): at 08:47

## 2024-04-26 RX ADMIN — Medication 1 TABLET(S): at 17:54

## 2024-04-26 RX ADMIN — Medication 1000 MILLIGRAM(S): at 08:55

## 2024-04-26 RX ADMIN — HYDROMORPHONE HYDROCHLORIDE 0.5 MILLIGRAM(S): 2 INJECTION INTRAMUSCULAR; INTRAVENOUS; SUBCUTANEOUS at 18:49

## 2024-04-26 RX ADMIN — INSULIN HUMAN 7 UNIT(S): 100 INJECTION, SOLUTION SUBCUTANEOUS at 17:52

## 2024-04-26 RX ADMIN — HYDROMORPHONE HYDROCHLORIDE 0.5 MILLIGRAM(S): 2 INJECTION INTRAMUSCULAR; INTRAVENOUS; SUBCUTANEOUS at 19:51

## 2024-04-26 RX ADMIN — Medication 3 MILLIGRAM(S): at 21:53

## 2024-04-26 RX ADMIN — INSULIN HUMAN 7 UNIT(S): 100 INJECTION, SOLUTION SUBCUTANEOUS at 06:29

## 2024-04-26 RX ADMIN — INSULIN GLARGINE 12 UNIT(S): 100 INJECTION, SOLUTION SUBCUTANEOUS at 17:52

## 2024-04-26 RX ADMIN — Medication 1000 MILLIGRAM(S): at 22:30

## 2024-04-26 RX ADMIN — Medication 3 MILLILITER(S): at 11:19

## 2024-04-26 RX ADMIN — Medication 15 MILLILITER(S): at 11:59

## 2024-04-26 RX ADMIN — Medication 1 DROP(S): at 11:59

## 2024-04-26 RX ADMIN — Medication 1 DROP(S): at 08:46

## 2024-04-26 RX ADMIN — INSULIN HUMAN 9 UNIT(S): 100 INJECTION, SOLUTION SUBCUTANEOUS at 12:01

## 2024-04-26 NOTE — PROGRESS NOTE ADULT - ASSESSMENT
73 yo F PMH T2DM on insulin, HTN, HLD, hypothyroidism, RA on Prednisone, Fibromyalgia, cerebral aneurysm s/p repair, chronic hypercapnic respiratory failure s/p trach (vent dependent) and Chronic PEG 2022, recurrent C. diff infection (follows with Dr. Newman) , bedbound who presented from home with G tube dislodgement and redness around the site. Had CT Abdomen/Pelvis done showing dislodgement of G tube with balloon in the anterior abdominal wall with air filled track to stomach. Admitted to MICU for ventilator management and given vancomycin/meropenem empirically for treatment of abd wall cellulitis. Per family patient has had Known c diff infection for past 2-3 weeks and was being treated with Fidaxomicin, which completed inpatient.  IR team exchanged PEG on 4/3 however later in the day it remained with leakage.  IR Attending adjusted PEG at bedside on 4/4 and PEG remained with moderate amount of PEG leakage once feeds initiated at reduced rate.  GI team re-consulted as second opinion for PEG management.  On 4/9 PEG was found out of place, a emerson catheter was placed in the stoma to maintain patency.  Patient was planned for PEG revision with both Surgery and GI team involved on 4/11 however patient had fevers up to 102F and procedure was cancelled for infectious work-up.  An 18 Fr PEG tube placed at bedside on 4/12 (original PEG size was 20Fr) as stoma site appears to have closed. Antibiotics were switched from Meropenem to Cefepime, completed 4/14. Patient now s/p PEG-J and closure of gastric fistula with GI and surgery in endo suite on 4/16.  New stoma site with no leakage, pt has been tolerating feeds.  A FEES was performed on 4/23 with recs for comfort feeds with parameters as per SLP.  Pt to continue vanco taper until 05/23 for c diff treatment with outpatient follow up with Dr. Newman.  Continuing to pressure support as tolerated.  Pt to return home.      4/24: No events reported overnight, Home Vent trial performed today without issues. Patient remains medically stable for discharge to home. Daughter with concerns over frequency of BMs ( 4 in 24 hrs); will add Banana Flakes QD.   4/25: No events over night.  There was moderate amount of clear viscous secretions that accumulated over night from old PEG stoma site.  Otherwise, remains medically stable for discharge to home on ventilator with home care once services available.  75 yo F PMH T2DM on insulin, HTN, HLD, hypothyroidism, RA on Prednisone, Fibromyalgia, cerebral aneurysm s/p repair, chronic hypercapnic respiratory failure s/p trach (vent dependent) and Chronic PEG 2022, recurrent C. diff infection (follows with Dr. Newman) , bedbound who presented from home with G tube dislodgement and redness around the site. Had CT Abdomen/Pelvis done showing dislodgement of G tube with balloon in the anterior abdominal wall with air filled track to stomach. Admitted to MICU for ventilator management and given vancomycin/meropenem empirically for treatment of abd wall cellulitis. Per family patient has had Known c diff infection for past 2-3 weeks and was being treated with Fidaxomicin, which completed inpatient.  IR team exchanged PEG on 4/3 however later in the day it remained with leakage.  IR Attending adjusted PEG at bedside on 4/4 and PEG remained with moderate amount of PEG leakage once feeds initiated at reduced rate.  GI team re-consulted as second opinion for PEG management.  On 4/9 PEG was found out of place, a emerson catheter was placed in the stoma to maintain patency.  Patient was planned for PEG revision with both Surgery and GI team involved on 4/11 however patient had fevers up to 102F and procedure was cancelled for infectious work-up.  An 18 Fr PEG tube placed at bedside on 4/12 (original PEG size was 20Fr) as stoma site appears to have closed. Antibiotics were switched from Meropenem to Cefepime, completed 4/14. Patient now s/p PEG-J and closure of gastric fistula with GI and surgery in endo suite on 4/16.  New stoma site with no leakage, pt has been tolerating feeds.  A FEES was performed on 4/23 with recs for comfort feeds with parameters as per SLP.  Pt to continue vanco taper until 05/23 for c diff treatment with outpatient follow up with Dr. Newman.  Continuing to pressure support as tolerated.  Pt to return home.      4/24: No events reported overnight, Home Vent trial performed today without issues. Patient remains medically stable for discharge to home. Daughter with concerns over frequency of BMs ( 4 in 24 hrs); will add Banana Flakes QD.   4/25: No events over night.  There was moderate amount of clear viscous secretions that accumulated over night from old PEG stoma site.  Otherwise, remains medically stable for discharge to home on ventilator with home care once services available.   4/26:  Patient with multiple loose bowel movements this afternoon.  As arrangements for discharge is tentative for 4/29, a fecal containment device will be temporarily placed for large stool burden and for prevention of sacral wound deterioration.  Florastor probiotic unable to be crushed per pharmacy, will add bacid tablets BID and activia as per ID reccomendations.

## 2024-04-26 NOTE — PROGRESS NOTE ADULT - SUBJECTIVE AND OBJECTIVE BOX
Follow Up:  cdiff    Interval History/ROS:  increased diarrhea  - about 10 BMs in last day  - RN at bedside states without characteristic Cdiff smell    Allergies  pineapple (Unknown)  Tagamet (Unknown)  heparin (Unknown)  walnut (Unknown)  metronidazole (Rash)  penicillin (Unknown)  Lyrica (Unknown)  meropenem (Rash)  Pecan, Filbert, Hazelnut (Unknown)        ANTIMICROBIALS:  vancomycin    Solution 125 daily      OTHER MEDS:  MEDICATIONS  (STANDING):  acetaminophen   Oral Liquid .. 1000 every 6 hours PRN  albuterol/ipratropium for Nebulization 3 every 6 hours  amLODIPine   Tablet 10 <User Schedule>  escitalopram 10 <User Schedule>  fentaNYL   Patch  25 MICROgram(s)/Hr 1 every 72 hours  gabapentin Solution 100 <User Schedule>  insulin glargine Injectable (LANTUS) 12 <User Schedule>  insulin regular  human corrective regimen sliding scale  every 6 hours  insulin regular  human recombinant 9 <User Schedule>  insulin regular  human recombinant 7 <User Schedule>  levothyroxine 125 daily  melatonin Liquid 3 at bedtime  ondansetron Injectable 4 every 8 hours PRN  oxyCODONE    Solution 5 every 4 hours PRN  oxyCODONE    Solution 10 every 6 hours PRN  pantoprazole  Injectable 40 daily  predniSONE  Solution 5 <User Schedule>  QUEtiapine 12.5 <User Schedule>  simethicone 80 every 6 hours      Vital Signs Last 24 Hrs  T(C): 37.3 (26 Apr 2024 11:30), Max: 37.3 (26 Apr 2024 11:30)  T(F): 99.2 (26 Apr 2024 11:30), Max: 99.2 (26 Apr 2024 11:30)  HR: 84 (26 Apr 2024 16:01) (82 - 97)  BP: 120/58 (26 Apr 2024 11:30) (119/54 - 132/66)  BP(mean): --  RR: 18 (26 Apr 2024 11:30) (16 - 18)  SpO2: 100% (26 Apr 2024 16:01) (100% - 100%)    Parameters below as of 26 Apr 2024 11:37  Patient On (Oxygen Delivery Method): ventilator        PHYSICAL EXAM:  General: WN/WD NAD, Non-toxic  Neurology: A&Ox3, nonfocal  Respiratory: Clear to auscultation bilaterally  CV: RRR, S1S2, no murmurs, rubs or gallops  Abdominal: Soft, Non-tender, non-distended, fluid from previous PEG site  Extremities: generalized modest edema  Line Sites: Clear  Skin: No rash  - excoration in perinal area                    MICROBIOLOGY:  .Blood Blood-Peripheral  04-15-24   No growth at 5 days  --  --      .Blood Blood-Peripheral  04-15-24   No growth at 5 days  --  --      Catheterized Catheterized  04-12-24   No growth  --  --      .Blood Blood-Peripheral  04-12-24   No growth at 5 days  --  --      .Blood Blood-Peripheral  04-11-24   No growth at 5 days  --  --      .Blood Blood-Peripheral  04-09-24   No growth at 5 days  --  --      .Sputum Sputum  04-07-24   Mixed gram negative rods Culture includes  Moderate Stenotrophomonas maltophilia  Few Pseudomonas aeruginosa (Carbapenem Resistant)  --  Stenotrophomonas maltophilia  Pseudomonas aeruginosa (Carbapenem Resistant)      .Blood Blood-Peripheral  04-07-24   No growth at 5 days  --  Blood Culture PCR  Serratia marcescens      .Sputum Sputum  04-05-24   Numerous Pseudomonas aeruginosa  Normal Respiratory Shanda present  --  Pseudomonas aeruginosa      .Blood Blood-Peripheral  04-04-24   No growth at 5 days  --  --      Clean Catch Clean Catch (Midstream)  04-03-24   <10,000 CFU/mL Normal Urogenital Shanda  --  --      .Blood Blood-Peripheral  04-02-24   No growth at 5 days  --  --      RADIOLOGY:    Zion Newman MD; Division of Infectious Disease; Pager: [phone]; nights and weekends: [phone]

## 2024-04-26 NOTE — DISCHARGE NOTE PROVIDER - CARE PROVIDER_API CALL
Andrew Morgan  Gastroenterology  [address] [place] [address]  Phone: [phone]  Fax: [phone]  Follow Up Time: Zion Lopez  Infectious Disease  [address] [place] [address]  Phone: [phone]  Fax: [phone]  Follow Up Time: 1 month   Andrew Morgan  Gastroenterology  [address] [place] [address]  Phone: [phone]  Fax: [phone]  Follow Up Time: Routine    Zion Newman  Infectious Disease  [address] [place] [address]  Phone: [phone]  Fax: [phone]  Follow Up Time: 1 month    Tin Estrada  Surgery  [address] [place] [address]  Phone: [phone]  Fax: [phone]  Follow Up Time: Routine    Noemi Soria  Otolaryngology  [address] [place] [address]  Phone: [phone]  Fax: [phone]  Follow Up Time: 2 months

## 2024-04-26 NOTE — PROGRESS NOTE ADULT - ASSESSMENT
#Gastrostomy malfunction/buried bumper  s/p PEG-J placement 4/16  tolerating feeds  ongoing surgery care appreciated     #Recurrent C.diff   per ID       d/c planning in progress

## 2024-04-26 NOTE — PROGRESS NOTE ADULT - PROBLEM SELECTOR PLAN 1
- HOB elevation  - Suction PRN  - Continue trach care  - No further ENT intervention  - Care per primary team

## 2024-04-26 NOTE — DISCHARGE NOTE PROVIDER - NSDCFUSCHEDAPPT_GEN_ALL_CORE_FT
Christa Elena  [facility]  [address]  Scheduled Appointment: 06/04/2024

## 2024-04-26 NOTE — DISCHARGE NOTE PROVIDER - PROVIDER TOKENS
PROVIDER:[TOKEN:[[id number]],FOLLOWUP:[Routine]],PROVIDER:[TOKEN:[[id number]],FOLLOWUP:[1 month]] PROVIDER:[TOKEN:[[id number]],FOLLOWUP:[Routine]],PROVIDER:[TOKEN:[[id number]],FOLLOWUP:[1 month]],PROVIDER:[TOKEN:[[id number]],FOLLOWUP:[Routine]],PROVIDER:[TOKEN:[[id number]],FOLLOWUP:[2 months]]

## 2024-04-26 NOTE — DISCHARGE NOTE PROVIDER - CARE PROVIDERS DIRECT ADDRESSES
,DirectAddress_Unknown,[email or web address] ,DirectAddress_Unknown,[email or web address],[email or web address]

## 2024-04-26 NOTE — DISCHARGE NOTE PROVIDER - INSTRUCTIONS
Gastrostomy port is used for medications and water.  Jejunostomy port is used for feeds only.  Patient receiving total of 180ml free water.  20ml every 2 hours over 18hrs.       Recommendations for oral diet:  Primary recommendation is NPO however with keeping aligned to goc/poc and for quality of life purposes would allow comfort feeds with certain parameters. Puree and thin liquids (tsp or straw) only allowed 1-2x/day with 100% assist/supervision with educated staff/family, 2-4 ounces total only, and 10 minute maximum with feeding task. Ensure pt as close to 90 degrees as able. Ensure pt on VENT PARAMETERS that were present during this study Gastrostomy port is used for medications and water.  Jejunostomy port is used for feeds only.  Vent Gastric port PRN for gastric distention 2 hrs after medication administration  Patient receiving total of 180ml free water - 20ml every 2 hours over 18hrs      Recommendations for oral diet:  Primary recommendation is NPO however with keeping aligned to goc/poc and for quality of life purposes would allow comfort feeds with certain parameters. Puree and thin liquids (tsp or straw) only allowed 1-2x/day with 100% assist/supervision with educated staff/family, 2-4 ounces total only, and 10 minute maximum with feeding task. Ensure pt as close to 90 degrees as able. Ensure pt on VENT PARAMETERS that were present during this study Tube Feeding Modality: Jejunostomy  [employer] Peptide 1.5  Total Volume for 24 hours (mL): 990  Continuous Starting Tube Feed Rate {mL per Hour}: 50  Increase Tube Feed Rate by (mL): 5  Every 24 hours  Unit Goal Tube Feed Rate (mL per Hour): 55  Tube Feed Duration (in Hours): 18  Tube Feed Start Time: 0600  Tube Feed Stop Time: 0000  Free Water Flush   Pump Rate (mL per Hour): 20  Frequency: Every 2 Hours  Alfred   Qty per Day:2   Banatrol TF  Qty per Day: 1   Probiotic Yogurt Servings per Day: 1    Gastrostomy port is used for medications and water.  Jejunostomy port is used for feeds only.  Vent Gastric port PRN for gastric distention 2 hrs after medication administration    Recommendations for oral diet:  Primary recommendation is NPO however with keeping aligned to goc/poc and for quality of life purposes would allow comfort feeds with certain parameters. Puree and thin liquids (tsp or straw) only allowed 1-2x/day with 100% assist/supervision with educated staff/family, 2-4 ounces total only, and 10 minute maximum with feeding task. Ensure pt as close to 90 degrees as able. Ensure pt on VENT PARAMETERS that were present during this study Tube Feeding Modality: Jejunostomy  [employer] Peptide 1.5  Total Volume for 24 hours (mL): 990  Continuous Starting Tube Feed Rate {mL per Hour}: 55  Increase Tube Feed Rate by (mL): 5  Every 24 hours  Unit Goal Tube Feed Rate (mL per Hour): 55  Tube Feed Duration (in Hours): 18  Tube Feed Start Time: 0600  Tube Feed Stop Time: 0000  Free Water Flush   Pump Rate (mL per Hour): 20  Frequency: Every 2 Hours  Alfred   Qty per Day:2   Banatrol TF  Qty per Day: 1   Probiotic Yogurt Servings per Day: 1    Gastrostomy port is used for medications and water.  Jejunostomy port is used for feeds only.  Vent Gastric port PRN for gastric distention 2 hrs after medication administration    Recommendations for oral diet:  Primary recommendation is NPO however with keeping aligned to goc/poc and for quality of life purposes would allow comfort feeds with certain parameters. Puree and thin liquids (tsp or straw) only allowed 1-2x/day with 100% assist/supervision with educated staff/family, 2-4 ounces total only, and 10 minute maximum with feeding task. Ensure pt as close to 90 degrees as able. Ensure pt on VENT PARAMETERS that were present during this study

## 2024-04-26 NOTE — DISCHARGE NOTE PROVIDER - DETAILS OF MALNUTRITION DIAGNOSIS/DIAGNOSES
This patient has been assessed with a concern for Malnutrition and was treated during this hospitalization for the following Nutrition diagnosis/diagnoses:     -  04/17/2024: Severe protein-calorie malnutrition

## 2024-04-26 NOTE — PROGRESS NOTE ADULT - NS ATTEND AMEND GEN_ALL_CORE FT
ENT following for airway evaluation and trach change    74y Female with PMH of T2DM on insulin, HTN, HLD, hypothyroidism, RA on Prednisone, Fibromyalgia, cerebral aneurysm s/p repair, acute hypercapnic respiratory failure s/p trach (vent dependent) and PEG (10/22), bedbound presented from home with complaints of possible G tube dislodgement and redness around the site, now s/p replacement.     4/25/24 Patient is now s/p tracheosotmy tube exchange from 8XLT (distal) cuffed to an 8XLT(distal) cuffed and silver nitrate cautery of granulation tissue (superior to stoma). Bedside tracheoscopy shows tracheomalacia nick visualized, no purulence, no erythema Pt tolerated the procedure well without complications.    Physical exam shows Trach in place. Trach sponge under trach. Improved granulation tissue    Recommend:  Granulation Tissue  - HOB elevation  - Suction PRN  - Continue trach care  - No further ENT intervention  - Care per primary team  - Keep trach sponge between stoma and trach to prevent granulation tissue from forming

## 2024-04-26 NOTE — DISCHARGE NOTE PROVIDER - NSDCMRMEDTOKEN_GEN_ALL_CORE_FT
acetaminophen 500 mg/15 mL oral liquid: 30 milliliter(s) by gastrostomy tube every 6 hours as needed for  fever or pain  acetaminophen 500 mg/15 mL oral liquid: 30 milliliter(s) by gastrostomy tube every 6 hours as needed for  fever or pain  amLODIPine 10 mg oral tablet: 1 tab(s) by gastrostomy tube once a day  ascorbic acid 500 mg oral tablet: 1 tab(s) by gastrostomy tube once a day  Basaglar KwikPen 100 units/mL subcutaneous solution: 12 unit(s) subcutaneous once a day Give at 6PM  calamine topical lotion: 1 Apply topically to affected area once a day As needed Rash and/or Itching  diclofenac 1% topical gel: Apply topically to affected area every 6 hours as needed for pain Apply to both knees  or hands for joint pain  erythromycin 0.5% ophthalmic ointment: 1 application to each affected eye once a day (at bedtime)  escitalopram 10 mg oral tablet: 1 tab(s) by gastrostomy tube once a day MDD: 10mg  escitalopram 10 mg oral tablet: 1 tab(s) by gastrostomy tube once a day MDD: 10mg  First-Pantoprazole 4 mg/mL oral suspension: 10 milliliter(s) by gastrostomy tube once a day  Firvanq 25 mg/mL oral liquid: 25 milliliter(s) by gastrostomy tube once a day Give daily on 4/27 through 5/2  Give every other day on 5/3 through 5/9  Give every 3rd day on 5/10 through 5/23  guaiFENesin 100 mg/5 mL oral liquid: 10 milliliter(s) by gastrostomy tube every 6 hours as needed for  congestion  ipratropium-albuterol 0.5 mg-2.5 mg/3 mL inhalation solution: 3 milliliter(s) inhaled every 6 hours ICD 10 code  J68.3 dispense 30 day supply  levothyroxine 125 mcg (0.125 mg) oral tablet: 1 tab(s) by gastrostomy tube once a day Give at 5AM, 1 hour before starting PEG feeds  melatonin 0.25 mg/mL oral liquid: 12 milliliter(s) by gastrostomy tube once a day (at bedtime)  melatonin 0.25 mg/mL oral liquid: 12 milliliter(s) by gastrostomy tube once a day (at bedtime)  Multiple Vitamins with Minerals oral liquid: 15 milliliter(s) by gastrostomy tube once a day  NovoLIN R FlexPen 100 units/mL injectable solution: 7 unit(s) injectable 2 times a day Give at 6AM and 6PM  NovoLIN R FlexPen 100 units/mL injectable solution: 10 unit(s) injectable once a day Give at 12PM  prednisoLONE acetate 1% ophthalmic suspension: 1 drop(s) to each affected eye 4 times a day apply to both eyes  predniSONE 5 mg/5 mL oral solution: 5 milliliter(s) by gastrostomy tube once a day  predniSONE 5 mg/5 mL oral solution: 5 milliliter(s) by gastrostomy tube  Seroquel 25 mg oral tablet: 0.5 tab(s) by gastrostomy tube once a day Given at 3PM while in hospital  Seroquel 25 mg oral tablet: 0.5 tab(s) by gastrostomy tube once a day Given at 3PM while in hospital  simethicone 80 mg oral tablet, chewable: 1 tab(s) by gastrostomy tube every 6 hours  Systane Complete Preservative Free ophthalmic solution: 1 drop(s) to each affected eye every 2 hours apply to both eyes   acetaminophen 500 mg/15 mL oral liquid: 30 milliliter(s) by gastrostomy tube every 6 hours as needed for  fever or pain  amLODIPine 10 mg oral tablet: 1 tab(s) by gastrostomy tube once a day  ascorbic acid 500 mg oral tablet: 1 tab(s) by gastrostomy tube once a day  Basaglar KwikPen 100 units/mL subcutaneous solution: 12 unit(s) subcutaneous once a day Give at 6PM  calamine topical lotion: 1 Apply topically to affected area once a day As needed Rash and/or Itching  diclofenac 1% topical gel: Apply topically to affected area every 6 hours as needed for pain Apply to both knees  or hands for joint pain  erythromycin 0.5% ophthalmic ointment: 1 application to each affected eye once a day (at bedtime)  escitalopram 10 mg oral tablet: 1 tab(s) by gastrostomy tube once a day MDD: 10mg  escitalopram 10 mg oral tablet: 1 tab(s) by gastrostomy tube once a day MDD: 10mg  fentaNYL 25 mcg/hr transdermal film, extended release: 1 patch transdermally every 72 hours 1 patch per 72 hr MDD: 1 patch per 72 hours  First-Pantoprazole 4 mg/mL oral suspension: 10 milliliter(s) by gastrostomy tube once a day  Firvanq 25 mg/mL oral liquid: 25 milliliter(s) by gastrostomy tube once a day Give daily on 4/27 through 5/2  Give every other day on 5/3 through 5/9  Give every 3rd day on 5/10 through 5/23  gabapentin 250 mg/5 mL oral solution: 100 milligram(s) by gastrostomy tube 2 times a day give at 9AM and 9PM MDD: 200mg  guaiFENesin 100 mg/5 mL oral liquid: 10 milliliter(s) by gastrostomy tube every 6 hours as needed for  congestion  ipratropium-albuterol 0.5 mg-2.5 mg/3 mL inhalation solution: 3 milliliter(s) inhaled every 6 hours ICD 10 code  J68.3 dispense 30 day supply  levothyroxine 125 mcg (0.125 mg) oral tablet: 1 tab(s) by gastrostomy tube once a day Give at 5AM, 1 hour before starting PEG feeds  melatonin 0.25 mg/mL oral liquid: 12 milliliter(s) by gastrostomy tube once a day (at bedtime)  Multiple Vitamins with Minerals oral liquid: 15 milliliter(s) by gastrostomy tube once a day  NovoLIN R FlexPen 100 units/mL injectable solution: 7 unit(s) injectable 2 times a day Give at 6AM and 6PM  NovoLIN R FlexPen 100 units/mL injectable solution: 10 unit(s) injectable once a day Give at 12PM  oxyCODONE 5 mg/5 mL oral solution: 5 milliliter(s) by gastrostomy tube every 6 hours as needed for Moderate Pain (4 - 6) MDD: 20mg  oxyCODONE 5 mg/5 mL oral solution: 10 milliliter(s) by gastrostomy tube every 6 hours as needed for Severe Pain (7 - 10) MDD: 40ml  prednisoLONE acetate 1% ophthalmic suspension: 1 drop(s) to each affected eye 4 times a day apply to both eyes  predniSONE 5 mg/5 mL oral solution: 5 milliliter(s) by gastrostomy tube once a day  Seroquel 25 mg oral tablet: 0.5 tab(s) by gastrostomy tube once a day Given at 3PM while in hospital  simethicone 80 mg oral tablet, chewable: 1 tab(s) by gastrostomy tube every 6 hours  Systane Complete Preservative Free ophthalmic solution: 1 drop(s) to each affected eye every 2 hours apply to both eyes   abdominal pads: apply to affected site  acetaminophen 500 mg/15 mL oral liquid: 30 milliliter(s) by gastrostomy tube every 6 hours as needed for  fever or pain  amLODIPine 10 mg oral tablet: 1 tab(s) by gastrostomy tube once a day  ascorbic acid 500 mg oral tablet: 1 tab(s) by gastrostomy tube once a day  Basaglar KwikPen 100 units/mL subcutaneous solution: 12 unit(s) subcutaneous once a day Give at 6PM  calamine topical lotion: 1 Apply topically to affected area once a day As needed Rash and/or Itching  diclofenac 1% topical gel: Apply topically to affected area every 6 hours as needed for pain Apply to both knees  or hands for joint pain  erythromycin 0.5% ophthalmic ointment: 1 application to each affected eye once a day (at bedtime)  escitalopram 10 mg oral tablet: 1 tab(s) by gastrostomy tube once a day MDD: 10mg  escitalopram 10 mg oral tablet: 1 tab(s) by gastrostomy tube once a day MDD: 10mg  fentaNYL 25 mcg/hr transdermal film, extended release: 1 patch transdermally every 72 hours 1 patch per 72 hr MDD: 1 patch per 72 hours  First-Pantoprazole 4 mg/mL oral suspension: 10 milliliter(s) by gastrostomy tube once a day  Firvanq 25 mg/mL oral liquid: 25 milliliter(s) by gastrostomy tube once a day Give daily on 4/27 through 5/2  Give every other day on 5/3 through 5/9  Give every 3rd day on 5/10 through 5/23  gabapentin 250 mg/5 mL oral solution: 100 milligram(s) by gastrostomy tube 2 times a day give at 9AM and 9PM MDD: 200mg  gloves: large and extra large  guaiFENesin 100 mg/5 mL oral liquid: 10 milliliter(s) by gastrostomy tube every 6 hours as needed for  congestion  ipratropium-albuterol 0.5 mg-2.5 mg/3 mL inhalation solution: 3 milliliter(s) inhaled every 6 hours ICD 10 code  J68.3 dispense 30 day supply  levothyroxine 125 mcg (0.125 mg) oral tablet: 1 tab(s) by gastrostomy tube once a day Give at 5AM, 1 hour before starting PEG feeds  melatonin 0.25 mg/mL oral liquid: 12 milliliter(s) by gastrostomy tube once a day (at bedtime)  Multiple Vitamins with Minerals oral liquid: 15 milliliter(s) by gastrostomy tube once a day  NovoLIN R FlexPen 100 units/mL injectable solution: 7 unit(s) injectable 2 times a day Give at 6AM and 6PM  NovoLIN R FlexPen 100 units/mL injectable solution: 10 unit(s) injectable once a day Give at 12PM  oxyCODONE 5 mg/5 mL oral solution: 5 milliliter(s) by gastrostomy tube every 6 hours as needed for Moderate Pain (4 - 6) MDD: 20mg  oxyCODONE 5 mg/5 mL oral solution: 10 milliliter(s) by gastrostomy tube every 6 hours as needed for Severe Pain (7 - 10) MDD: 40ml  prednisoLONE acetate 1% ophthalmic suspension: 1 drop(s) to each affected eye 4 times a day apply to both eyes  predniSONE 5 mg/5 mL oral solution: 5 milliliter(s) by gastrostomy tube once a day  purewick: apply for urinary incontinence  Seroquel 25 mg oral tablet: 0.5 tab(s) by gastrostomy tube once a day Given at 3PM while in hospital  simethicone 80 mg oral tablet, chewable: 1 tab(s) by gastrostomy tube every 6 hours  Systane Complete Preservative Free ophthalmic solution: 1 drop(s) to each affected eye every 2 hours apply to both eyes  toothette oral care kits: for oral care use   acetaminophen 500 mg/15 mL oral liquid: 30 milliliter(s) by gastrostomy tube every 6 hours as needed for  fever or pain  amLODIPine 10 mg oral tablet: 1 tab(s) by gastrostomy tube once a day  ascorbic acid 500 mg oral tablet: 1 tab(s) by gastrostomy tube once a day  Basaglar KwikPen 100 units/mL subcutaneous solution: 12 unit(s) subcutaneous once a day Give at 6PM  bismuth subsalicylate 262 mg/15 mL oral suspension: 15 milliliter(s) by gastrostomy tube once a day  calamine topical lotion: 1 Apply topically to affected area once a day As needed Rash and/or Itching  diclofenac 1% topical gel: Apply topically to affected area every 6 hours as needed for pain Apply to both knees  or hands for joint pain  erythromycin 0.5% ophthalmic ointment: 1 application to each affected eye once a day (at bedtime)  escitalopram 10 mg oral tablet: 1 tab(s) by gastrostomy tube once a day MDD: 10mg  escitalopram 10 mg oral tablet: 1 tab(s) by gastrostomy tube once a day MDD: 10mg  fentaNYL 25 mcg/hr transdermal film, extended release: 1 patch transdermally every 72 hours 1 patch per 72 hr MDD: 1 patch per 72 hours  First-Pantoprazole 4 mg/mL oral suspension: 10 milliliter(s) by gastrostomy tube once a day  Firvanq 25 mg/mL oral liquid: 25 milliliter(s) by gastrostomy tube once a day Give daily on 4/27 through 5/2  Give every other day on 5/3 through 5/9  Give every 3rd day on 5/10 through 5/23  gabapentin 250 mg/5 mL oral solution: 100 milligram(s) by gastrostomy tube 2 times a day give at 9AM and 9PM MDD: 200mg  guaiFENesin 100 mg/5 mL oral liquid: 10 milliliter(s) by gastrostomy tube every 6 hours as needed for  congestion  ipratropium-albuterol 0.5 mg-2.5 mg/3 mL inhalation solution: 3 milliliter(s) inhaled every 6 hours ICD 10 code  J68.3 dispense 30 day supply  lactobacillus acidophilus oral capsule: 1 tab(s) by gastrostomy tube once a day  levothyroxine 125 mcg (0.125 mg) oral tablet: 1 tab(s) by gastrostomy tube once a day Give at 5AM, 1 hour before starting PEG feeds  melatonin 0.25 mg/mL oral liquid: 12 milliliter(s) by gastrostomy tube once a day (at bedtime)  Multiple Vitamins with Minerals oral liquid: 15 milliliter(s) by gastrostomy tube once a day  NovoLIN R FlexPen 100 units/mL injectable solution: 7 unit(s) injectable 2 times a day Give at 6AM and 6PM  NovoLIN R FlexPen 100 units/mL injectable solution: 10 unit(s) injectable once a day Give at 12PM  nystatin 100,000 units/g topical powder: 1 Apply topically to affected area 2 times a day  oxyCODONE 5 mg/5 mL oral solution: 5 milliliter(s) by gastrostomy tube every 6 hours as needed for Moderate Pain (4 - 6) MDD: 20mg  oxyCODONE 5 mg/5 mL oral solution: 10 milliliter(s) by gastrostomy tube every 6 hours as needed for Severe Pain (7 - 10) MDD: 40ml  prednisoLONE acetate 1% ophthalmic suspension: 1 drop(s) to each affected eye 4 times a day apply to both eyes  predniSONE 5 mg/5 mL oral solution: 5 milliliter(s) by gastrostomy tube once a day  Seroquel 25 mg oral tablet: 0.5 tab(s) by gastrostomy tube once a day Given at 3PM while in hospital  simethicone 80 mg oral tablet, chewable: 1 tab(s) by gastrostomy tube every 6 hours  Systane Complete Preservative Free ophthalmic solution: 1 drop(s) to each affected eye every 2 hours apply to both eyes   acetaminophen 500 mg/15 mL oral liquid: 30 milliliter(s) by gastrostomy tube every 6 hours as needed for  fever or pain  amLODIPine 10 mg oral tablet: 1 tab(s) by gastrostomy tube once a day  ascorbic acid 500 mg oral tablet: 1 tab(s) by gastrostomy tube once a day  Basaglar KwikPen 100 units/mL subcutaneous solution: 12 unit(s) subcutaneous once a day Give at 6PM  bismuth subsalicylate 262 mg/15 mL oral suspension: 15 milliliter(s) by gastrostomy tube once a day  calamine topical lotion: 1 Apply topically to affected area once a day As needed Rash and/or Itching  diclofenac 1% topical gel: Apply topically to affected area every 6 hours as needed for pain Apply to both knees  or hands for joint pain  erythromycin 0.5% ophthalmic ointment: 1 application to each affected eye once a day (at bedtime)  escitalopram 10 mg oral tablet: 1 tab(s) by gastrostomy tube once a day MDD: 10mg  escitalopram 10 mg oral tablet: 1 tab(s) by gastrostomy tube once a day MDD: 10mg  fentaNYL 25 mcg/hr transdermal film, extended release: 1 patch transdermally every 72 hours 1 patch per 72 hr MDD: 1 patch per 72 hours  First-Pantoprazole 4 mg/mL oral suspension: 10 milliliter(s) by gastrostomy tube once a day  Firvanq 25 mg/mL oral liquid: 25 milliliter(s) by gastrostomy tube once a day Give daily on 4/27 through 5/2  Give every other day on 5/3 through 5/9  Give every 3rd day on 5/10 through 5/23  gabapentin 250 mg/5 mL oral solution: 100 milligram(s) by gastrostomy tube 2 times a day give at 9AM and 9PM MDD: 200mg  guaiFENesin 100 mg/5 mL oral liquid: 10 milliliter(s) by gastrostomy tube every 6 hours as needed for  congestion  ipratropium-albuterol 0.5 mg-2.5 mg/3 mL inhalation solution: 3 milliliter(s) inhaled every 6 hours ICD 10 code  J68.3 dispense 30 day supply  lactobacillus acidophilus oral capsule: 1 tab(s) by gastrostomy tube once a day  levothyroxine 125 mcg (0.125 mg) oral tablet: 1 tab(s) by gastrostomy tube once a day Give at 5AM, 1 hour before starting PEG feeds  melatonin 0.25 mg/mL oral liquid: 12 milliliter(s) by gastrostomy tube once a day (at bedtime)  Multiple Vitamins with Minerals oral liquid: 15 milliliter(s) by gastrostomy tube once a day  NovoLIN R FlexPen 100 units/mL injectable solution: 11 unit(s) injectable once a day Give at 6AM  NovoLIN R FlexPen 100 units/mL injectable solution: 9 unit(s) injectable once a day Give at 12PM  NovoLIN R FlexPen 100 units/mL injectable solution: 7 unit(s) injectable once a day Give @ 1800  nystatin 100,000 units/g topical powder: 1 Apply topically to affected area 2 times a day  oxyCODONE 5 mg/5 mL oral solution: 5 milliliter(s) by gastrostomy tube every 6 hours as needed for Moderate Pain (4 - 6) MDD: 20mg  oxyCODONE 5 mg/5 mL oral solution: 10 milliliter(s) by gastrostomy tube every 6 hours as needed for Severe Pain (7 - 10) MDD: 40ml  prednisoLONE acetate 1% ophthalmic suspension: 1 drop(s) to each affected eye 4 times a day apply to both eyes  predniSONE 5 mg/5 mL oral solution: 5 milliliter(s) by gastrostomy tube once a day  Seroquel 25 mg oral tablet: 0.5 tab(s) by gastrostomy tube once a day Given at 3PM while in hospital  simethicone 80 mg oral tablet, chewable: 1 tab(s) by gastrostomy tube every 6 hours  Systane Complete Preservative Free ophthalmic solution: 1 drop(s) to each affected eye every 2 hours apply to both eyes   fentaNYL 25 mcg/hr transdermal film, extended release: 1 patch transdermally every 72 hours 1 patch per 72 hr MDD: 1 patch per 72 hours   acetaminophen 500 mg/15 mL oral liquid: 30 milliliter(s) by gastrostomy tube every 6 hours as needed for  fever or pain  amLODIPine 10 mg oral tablet: 1 tab(s) by gastrostomy tube once a day  ascorbic acid 500 mg oral tablet: 1 tab(s) by gastrostomy tube once a day  Basaglar KwikPen 100 units/mL subcutaneous solution: 12 unit(s) subcutaneous once a day Give at 6PM  bismuth subsalicylate 262 mg/15 mL oral suspension: 15 milliliter(s) by gastrostomy tube once a day  calamine-zinc oxide 8%-8% topical lotion: Apply topically to affected area once a day  diclofenac 1% topical gel: Apply topically to affected area every 6 hours as needed for pain Apply to both knees  or hands for joint pain  erythromycin 0.5% ophthalmic ointment: 1 application to each affected eye once a day (at bedtime)  erythromycin 0.5% ophthalmic ointment: 1 application in each affected eye once a day (at bedtime)  escitalopram 10 mg oral tablet: 1 tab(s) by gastrostomy tube once a day MDD: 10mg  fentaNYL 25 mcg/hr transdermal film, extended release: 1 patch transdermally every 72 hours 1 patch per 72 hr MDD: 1 patch per 72 hours  First-Pantoprazole 4 mg/mL oral suspension: 10 milliliter(s) by gastrostomy tube once a day  Firvanq 25 mg/mL oral liquid: 25 milliliter(s) by gastrostomy tube once a day Give daily on 4/27 through 5/2  Give every other day on 5/3 through 5/9  Give every 3rd day on 5/10 through 5/23  gabapentin 250 mg/5 mL oral solution: 100 milligram(s) by gastrostomy tube 2 times a day give at 9AM and 9PM MDD: 200mg  guaiFENesin 100 mg/5 mL oral liquid: 10 milliliter(s) by gastrostomy tube every 6 hours as needed for  congestion  ipratropium-albuterol 0.5 mg-2.5 mg/3 mL inhalation solution: 3 milliliter(s) inhaled every 6 hours ICD 10 code  J68.3 dispense 30 day supply  lactobacillus acidophilus oral capsule: 1 cap(s) by gastrostomy tube once a day  levothyroxine 125 mcg (0.125 mg) oral tablet: 1 tab(s) by gastrostomy tube once a day Give at 5AM, 1 hour before starting PEG feeds  melatonin 0.25 mg/mL oral liquid: 12 milliliter(s) by gastrostomy tube once a day (at bedtime)  Multiple Vitamins with Minerals oral liquid: 15 milliliter(s) by gastrostomy tube once a day  naloxone 4 mg/0.1 mL nasal spray: 1 spray(s) intranasally once as needed for overdose 1 spray q 2-3 minutes alternating between nostrils  NovoLIN R FlexPen 100 units/mL injectable solution: 7 unit(s) injectable once a day Give @ 1800  NovoLIN R FlexPen 100 units/mL injectable solution: 9 unit(s) injectable once a day Give at 12PM  NovoLIN R FlexPen 100 units/mL injectable solution: 11 unit(s) injectable once a day Give at 6AM  nystatin 100,000 units/g topical powder: Apply topically to affected area 2 times a day  oxyCODONE 5 mg/5 mL oral solution: 5 milliliter(s) by gastrostomy tube every 6 hours as needed for Moderate Pain (4 - 6) MDD: 20mg  oxyCODONE 5 mg/5 mL oral solution: 10 milliliter(s) by gastrostomy tube every 6 hours as needed for Severe Pain (7 - 10) MDD: 40ml  prednisoLONE acetate 1% ophthalmic suspension: 1 drop(s) to each affected eye 4 times a day apply to both eyes  predniSONE 5 mg/5 mL oral solution: 5 milliliter(s) by gastrostomy tube once a day  Seroquel 25 mg oral tablet: 0.5 tab(s) by gastrostomy tube once a day Given at 3PM while in hospital  simethicone 80 mg oral tablet, chewable: 1 tab(s) by gastrostomy tube every 6 hours  Systane Complete Preservative Free ophthalmic solution: 1 drop(s) to each affected eye every 2 hours apply to both eyes   acetaminophen 500 mg/15 mL oral liquid: 30 milliliter(s) by gastrostomy tube every 6 hours as needed for  fever or pain  amLODIPine 10 mg oral tablet: 1 tab(s) by gastrostomy tube once a day  ascorbic acid 500 mg oral tablet: 1 tab(s) by gastrostomy tube once a day  bismuth subsalicylate 262 mg/15 mL oral suspension: 15 milliliter(s) by gastrostomy tube once a day  calamine-zinc oxide 8%-8% topical lotion: Apply topically to affected area once a day  diclofenac 1% topical gel: Apply topically to affected area every 6 hours as needed for pain Apply to both knees  or hands for joint pain  erythromycin 0.5% ophthalmic ointment: 1 application to each affected eye once a day (at bedtime)  erythromycin 0.5% ophthalmic ointment: 1 application in each affected eye once a day (at bedtime)  escitalopram 10 mg oral tablet: 1 tab(s) by gastrostomy tube once a day MDD: 10mg  fentaNYL 25 mcg/hr transdermal film, extended release: 1 patch transdermally every 72 hours 1 patch per 72 hr MDD: 1 patch per 72 hours  First-Pantoprazole 4 mg/mL oral suspension: 10 milliliter(s) by gastrostomy tube once a day  Firvanq 25 mg/mL oral liquid: 25 milliliter(s) by gastrostomy tube once a day Give daily on 4/27 through 5/2  Give every other day on 5/3 through 5/9  Give every 3rd day on 5/10 through 5/23  gabapentin 250 mg/5 mL oral solution: 100 milligram(s) by gastrostomy tube 2 times a day give at 9AM and 9PM MDD: 200mg  guaiFENesin 100 mg/5 mL oral liquid: 10 milliliter(s) by gastrostomy tube every 6 hours as needed for  congestion  insulin glargine 100 units/mL subcutaneous solution: 12 unit(s) subcutaneous once a day give @ 6PM  insulin regular 100 units/mL human recombinant injectable solution: 7 unit(s) injectable once a day give @ 1800  insulin regular 100 units/mL human recombinant injectable solution: 9 unit(s) injectable once a day give @ 1200  insulin regular 100 units/mL human recombinant injectable solution: 11 unit(s) injectable once a day give @ 0600  ipratropium-albuterol 0.5 mg-2.5 mg/3 mL inhalation solution: 3 milliliter(s) inhaled every 6 hours ICD 10 code  J68.3 dispense 30 day supply  lactobacillus acidophilus oral capsule: 1 cap(s) by gastrostomy tube once a day  levothyroxine 125 mcg (0.125 mg) oral tablet: 1 tab(s) by gastrostomy tube once a day Give at 5AM, 1 hour before starting PEG feeds  melatonin 0.25 mg/mL oral liquid: 12 milliliter(s) by gastrostomy tube once a day (at bedtime)  Multiple Vitamins with Minerals oral liquid: 15 milliliter(s) by gastrostomy tube once a day  naloxone 4 mg/0.1 mL nasal spray: 1 spray(s) intranasally once as needed for overdose 1 spray q 2-3 minutes alternating between nostrils  nystatin 100,000 units/g topical powder: Apply topically to affected area 2 times a day  oxyCODONE 5 mg/5 mL oral solution: 5 milliliter(s) by gastrostomy tube every 6 hours as needed for Moderate Pain (4 - 6) MDD: 20mg  oxyCODONE 5 mg/5 mL oral solution: 10 milliliter(s) by gastrostomy tube every 6 hours as needed for Severe Pain (7 - 10) MDD: 40ml  prednisoLONE acetate 1% ophthalmic suspension: 1 drop(s) to each affected eye 4 times a day apply to both eyes  predniSONE 5 mg/5 mL oral solution: 5 milliliter(s) by gastrostomy tube once a day  Seroquel 25 mg oral tablet: 0.5 tab(s) by gastrostomy tube once a day Given at 3PM while in hospital  simethicone 80 mg oral tablet, chewable: 1 tab(s) by gastrostomy tube every 6 hours  Systane Complete Preservative Free ophthalmic solution: 1 drop(s) to each affected eye every 2 hours apply to both eyes   acetaminophen 500 mg/15 mL oral liquid: 30 milliliter(s) by gastrostomy tube every 6 hours as needed for  fever or pain  amLODIPine 10 mg oral tablet: 1 tab(s) by gastrostomy tube once a day  ascorbic acid 500 mg oral tablet: 1 tab(s) by gastrostomy tube once a day  bismuth subsalicylate 262 mg/15 mL oral suspension: 15 milliliter(s) by gastrostomy tube once a day  calamine-zinc oxide 8%-8% topical lotion: Apply topically to affected area once a day  diclofenac 1% topical gel: Apply topically to affected area every 6 hours as needed for pain Apply to both knees  or hands for joint pain  erythromycin 0.5% ophthalmic ointment: 1 application to each affected eye once a day (at bedtime)  erythromycin 0.5% ophthalmic ointment: 1 application in each affected eye once a day (at bedtime)  escitalopram 10 mg oral tablet: 1 tab(s) by gastrostomy tube once a day MDD: 10mg  fentaNYL 25 mcg/hr transdermal film, extended release: 1 patch transdermally every 72 hours 1 patch per 72 hr MDD: 1 patch per 72 hours  First-Pantoprazole 4 mg/mL oral suspension: 10 milliliter(s) by gastrostomy tube once a day  Firvanq 25 mg/mL oral liquid: 25 milliliter(s) by gastrostomy tube once a day Give daily on 4/27 through 5/2  Give every other day on 5/3 through 5/9  Give every 3rd day on 5/10 through 5/23  guaiFENesin 100 mg/5 mL oral liquid: 10 milliliter(s) by gastrostomy tube every 6 hours as needed for  congestion  insulin glargine 100 units/mL subcutaneous solution: 12 unit(s) subcutaneous once a day give @ 6PM  insulin regular 100 units/mL human recombinant injectable solution: 7 unit(s) injectable once a day give @ 1800  insulin regular 100 units/mL human recombinant injectable solution: 9 unit(s) injectable once a day give @ 1200  insulin regular 100 units/mL human recombinant injectable solution: 11 unit(s) injectable once a day give @ 0600  insulin syringes: for insulin administration  ipratropium-albuterol 0.5 mg-2.5 mg/3 mL inhalation solution: 3 milliliter(s) inhaled every 6 hours ICD 10 code  J68.3 dispense 30 day supply  lactobacillus acidophilus oral capsule: 1 cap(s) by gastrostomy tube once a day  levothyroxine 125 mcg (0.125 mg) oral tablet: 1 tab(s) by gastrostomy tube once a day Give at 5AM, 1 hour before starting PEG feeds  melatonin 0.25 mg/mL oral liquid: 12 milliliter(s) by gastrostomy tube once a day (at bedtime)  Multiple Vitamins with Minerals oral liquid: 15 milliliter(s) by gastrostomy tube once a day  naloxone 4 mg/0.1 mL nasal spray: 1 spray(s) intranasally once as needed for overdose 1 spray q 2-3 minutes alternating between nostrils  nystatin 100,000 units/g topical powder: Apply topically to affected area 2 times a day  oxyCODONE 5 mg/5 mL oral solution: 5 milliliter(s) by gastrostomy tube every 6 hours as needed for Moderate Pain (4 - 6) MDD: 20mg  oxyCODONE 5 mg/5 mL oral solution: 10 milliliter(s) by gastrostomy tube every 6 hours as needed for Severe Pain (7 - 10) MDD: 40ml  prednisoLONE acetate 1% ophthalmic suspension: 1 drop(s) to each affected eye 4 times a day apply to both eyes  predniSONE 5 mg/5 mL oral solution: 5 milliliter(s) by gastrostomy tube once a day  Seroquel 25 mg oral tablet: 0.5 tab(s) by gastrostomy tube once a day Given at 3PM while in hospital  simethicone 80 mg oral tablet, chewable: 1 tab(s) by gastrostomy tube every 6 hours  Systane Complete Preservative Free ophthalmic solution: 1 drop(s) to each affected eye every 2 hours apply to both eyes   acetaminophen 500 mg/15 mL oral liquid: 30 milliliter(s) by gastrostomy tube every 6 hours as needed for  fever or pain  amLODIPine 10 mg oral tablet: 1 tab(s) by gastrostomy tube once a day  ascorbic acid 500 mg oral tablet: 1 tab(s) by gastrostomy tube once a day  bismuth subsalicylate 262 mg/15 mL oral suspension: 15 milliliter(s) by gastrostomy tube once a day  calamine-zinc oxide 8%-8% topical lotion: Apply topically to affected area once a day  diclofenac 1% topical gel: Apply topically to affected area every 6 hours as needed for pain Apply to both knees  or hands for joint pain  erythromycin 0.5% ophthalmic ointment: 1 application to each affected eye once a day (at bedtime)  erythromycin 0.5% ophthalmic ointment: 1 application in each affected eye once a day (at bedtime)  escitalopram 10 mg oral tablet: 1 tab(s) by gastrostomy tube once a day MDD: 10mg  fentaNYL 25 mcg/hr transdermal film, extended release: 1 patch transdermally every 72 hours 1 patch per 72 hr MDD: 1 patch per 72 hours  First-Pantoprazole 4 mg/mL oral suspension: 10 milliliter(s) by gastrostomy tube once a day  Firvanq 25 mg/mL oral liquid: 25 milliliter(s) by gastrostomy tube once a day Give daily on 4/27 through 5/2  Give every other day on 5/3 through 5/9  Give every 3rd day on 5/10 through 5/23  guaiFENesin 100 mg/5 mL oral liquid: 10 milliliter(s) by gastrostomy tube every 6 hours as needed for  congestion  insulin glargine 100 units/mL subcutaneous solution: 12 unit(s) subcutaneous once a day give @ 6PM  insulin regular 100 units/mL human recombinant injectable solution: 7 unit(s) injectable once a day give @ 1800  insulin regular 100 units/mL human recombinant injectable solution: 9 unit(s) injectable once a day give @ 1200  insulin regular 100 units/mL human recombinant injectable solution: 11 unit(s) injectable once a day give @ 0600  insulin syringes: for insulin administration 4x a day  ipratropium-albuterol 0.5 mg-2.5 mg/3 mL inhalation solution: 3 milliliter(s) inhaled every 6 hours ICD 10 code  J68.3 dispense 30 day supply  lactobacillus acidophilus oral capsule: 1 cap(s) by gastrostomy tube once a day  levothyroxine 125 mcg (0.125 mg) oral tablet: 1 tab(s) by gastrostomy tube once a day Give at 5AM, 1 hour before starting PEG feeds  melatonin 0.25 mg/mL oral liquid: 12 milliliter(s) by gastrostomy tube once a day (at bedtime)  Multiple Vitamins with Minerals oral liquid: 15 milliliter(s) by gastrostomy tube once a day  naloxone 4 mg/0.1 mL nasal spray: 1 spray(s) intranasally once as needed for overdose 1 spray q 2-3 minutes alternating between nostrils  nystatin 100,000 units/g topical powder: Apply topically to affected area 2 times a day  oxyCODONE 5 mg/5 mL oral solution: 5 milliliter(s) by gastrostomy tube every 6 hours as needed for Moderate Pain (4 - 6) MDD: 20mg  oxyCODONE 5 mg/5 mL oral solution: 10 milliliter(s) by gastrostomy tube every 6 hours as needed for Severe Pain (7 - 10) MDD: 40ml  prednisoLONE acetate 1% ophthalmic suspension: 1 drop(s) to each affected eye 4 times a day apply to both eyes  predniSONE 5 mg/5 mL oral solution: 5 milliliter(s) by gastrostomy tube once a day  Seroquel 25 mg oral tablet: 0.5 tab(s) by gastrostomy tube once a day Given at 3PM while in hospital  simethicone 80 mg oral tablet, chewable: 1 tab(s) by gastrostomy tube every 6 hours  stomahesive paste: apply to wound bed  stomahesive powder: apply to sacral wound  Systane Complete Preservative Free ophthalmic solution: 1 drop(s) to each affected eye every 2 hours apply to both eyes   acetaminophen 500 mg/15 mL oral liquid: 30 milliliter(s) by gastrostomy tube every 6 hours as needed for  fever or pain  amLODIPine 10 mg oral tablet: 1 tab(s) by gastrostomy tube once a day  ascorbic acid 500 mg oral tablet: 1 tab(s) by gastrostomy tube once a day  bismuth subsalicylate 262 mg/15 mL oral suspension: 15 milliliter(s) by gastrostomy tube once a day  calamine-zinc oxide 8%-8% topical lotion: Apply topically to affected area once a day  diclofenac 1% topical gel: Apply topically to affected area every 6 hours as needed for pain Apply to both knees  or hands for joint pain  erythromycin 0.5% ophthalmic ointment: 1 application to each affected eye once a day (at bedtime)  erythromycin 0.5% ophthalmic ointment: 1 application in each affected eye once a day (at bedtime)  escitalopram 10 mg oral tablet: 1 tab(s) by gastrostomy tube once a day MDD: 10mg  fentaNYL 25 mcg/hr transdermal film, extended release: 1 patch transdermally every 72 hours 1 patch per 72 hr MDD: 1 patch per 72 hours  Firvanq 25 mg/mL oral liquid: 25 milliliter(s) by gastrostomy tube once a day Give daily on 4/27 through 5/2  Give every other day on 5/3 through 5/9  Give every 3rd day on 5/10 through 5/23  guaiFENesin 100 mg/5 mL oral liquid: 10 milliliter(s) by gastrostomy tube every 6 hours as needed for  congestion  insulin glargine 100 units/mL subcutaneous solution: 12 unit(s) subcutaneous once a day give @ 6PM  insulin regular 100 units/mL human recombinant injectable solution: 7 unit(s) injectable once a day give @ 1800  insulin regular 100 units/mL human recombinant injectable solution: 9 unit(s) injectable once a day give @ 1200  insulin regular 100 units/mL human recombinant injectable solution: 11 unit(s) injectable once a day give @ 0600  insulin syringes: for insulin administration 4x a day  ipratropium-albuterol 0.5 mg-2.5 mg/3 mL inhalation solution: 3 milliliter(s) inhaled every 6 hours ICD 10 code  J68.3 dispense 30 day supply  lactobacillus acidophilus oral capsule: 1 cap(s) by gastrostomy tube once a day  levothyroxine 125 mcg (0.125 mg) oral tablet: 1 tab(s) by gastrostomy tube once a day Give at 5AM, 1 hour before starting PEG feeds  melatonin 0.25 mg/mL oral liquid: 12 milliliter(s) by gastrostomy tube once a day (at bedtime)  Multiple Vitamins with Minerals oral liquid: 15 milliliter(s) by gastrostomy tube once a day  naloxone 4 mg/0.1 mL nasal spray: 1 spray(s) intranasally once as needed for overdose 1 spray q 2-3 minutes alternating between nostrils  nystatin 100,000 units/g topical powder: Apply topically to affected area 2 times a day  oxyCODONE 5 mg/5 mL oral solution: 5 milliliter(s) by gastrostomy tube every 6 hours as needed for Moderate Pain (4 - 6) MDD: 20mg  oxyCODONE 5 mg/5 mL oral solution: 10 milliliter(s) by gastrostomy tube every 6 hours as needed for Severe Pain (7 - 10) MDD: 40ml  pantoprazole 40 mg oral granule, delayed release: 40 milligram(s) by gastrostomy tube once a day  prednisoLONE acetate 1% ophthalmic suspension: 1 drop(s) to each affected eye 4 times a day apply to both eyes  predniSONE 5 mg/5 mL oral solution: 5 milliliter(s) by gastrostomy tube once a day  Seroquel 25 mg oral tablet: 0.5 tab(s) by gastrostomy tube once a day Given at 3PM while in hospital  simethicone 80 mg oral tablet, chewable: 1 tab(s) by gastrostomy tube every 6 hours  stomahesive paste: apply to wound bed daily  stomahesive paste: apply to wound bed  stomahesive powder: apply to sacral wound daily  Systane Complete Preservative Free ophthalmic solution: 1 drop(s) to each affected eye every 2 hours apply to both eyes   acetaminophen 500 mg/15 mL oral liquid: 30 milliliter(s) by gastrostomy tube every 6 hours as needed for  fever or pain  amLODIPine 10 mg oral tablet: 1 tab(s) by gastrostomy tube once a day  ascorbic acid 500 mg oral tablet: 1 tab(s) by gastrostomy tube once a day  BD Alcohol Single Use Swab 70% topical pad: Apply topically to affected area once a day apply to site as needed  bismuth subsalicylate 262 mg/15 mL oral suspension: 15 milliliter(s) by gastrostomy tube once a day  calamine-zinc oxide 8%-8% topical lotion: Apply topically to affected area once a day  diclofenac 1% topical gel: Apply topically to affected area every 6 hours as needed for pain Apply to both knees  or hands for joint pain  erythromycin 0.5% ophthalmic ointment: 1 application to each affected eye once a day (at bedtime)  erythromycin 0.5% ophthalmic ointment: 1 application in each affected eye once a day (at bedtime)  escitalopram 10 mg oral tablet: 1 tab(s) by gastrostomy tube once a day MDD: 10mg  fentaNYL 25 mcg/hr transdermal film, extended release: 1 patch transdermally every 72 hours 1 patch per 72 hr MDD: 1 patch per 72 hours  Firvanq 25 mg/mL oral liquid: 25 milliliter(s) by gastrostomy tube once a day Give daily on 4/27 through 5/2  Give every other day on 5/3 through 5/9  Give every 3rd day on 5/10 through 5/23  guaiFENesin 100 mg/5 mL oral liquid: 10 milliliter(s) by gastrostomy tube every 6 hours as needed for  congestion  insulin glargine 100 units/mL subcutaneous solution: 12 unit(s) subcutaneous once a day give @ 6PM  ipratropium-albuterol 0.5 mg-2.5 mg/3 mL inhalation solution: 3 milliliter(s) inhaled every 6 hours ICD 10 code  J68.3 dispense 30 day supply  lactobacillus acidophilus oral capsule: 1 cap(s) by gastrostomy tube once a day  levothyroxine 125 mcg (0.125 mg) oral tablet: 1 tab(s) by gastrostomy tube once a day Give at 5AM, 1 hour before starting PEG feeds  melatonin 0.25 mg/mL oral liquid: 12 milliliter(s) by gastrostomy tube once a day (at bedtime)  Multiple Vitamins with Minerals oral liquid: 15 milliliter(s) by gastrostomy tube once a day  naloxone 4 mg/0.1 mL nasal spray: 1 spray(s) intranasally once as needed for overdose 1 spray q 2-3 minutes alternating between nostrils  NovoLIN R FlexPen 100 units/mL injectable solution: 27 unit(s) injectable 3 times a day Give 11units @ 6AM, Give 9units @ 12PM, Give 7units @ 1800  nystatin 100,000 units/g topical powder: Apply topically to affected area 2 times a day  oxyCODONE 5 mg/5 mL oral solution: 5 milliliter(s) by gastrostomy tube every 6 hours as needed for Moderate Pain (4 - 6) MDD: 20mg  oxyCODONE 5 mg/5 mL oral solution: 10 milliliter(s) by gastrostomy tube every 6 hours as needed for Severe Pain (7 - 10) MDD: 40ml  pantoprazole 40 mg oral granule, delayed release: 40 milligram(s) by gastrostomy tube once a day  prednisoLONE acetate 1% ophthalmic suspension: 1 drop(s) to each affected eye 4 times a day apply to both eyes  predniSONE 5 mg/5 mL oral solution: 5 milliliter(s) by gastrostomy tube once a day  Seroquel 25 mg oral tablet: 0.5 tab(s) by gastrostomy tube once a day Given at 3PM while in hospital  simethicone 80 mg oral tablet, chewable: 1 tab(s) by gastrostomy tube every 6 hours  Systane Complete Preservative Free ophthalmic solution: 1 drop(s) to each affected eye every 2 hours apply to both eyes   acetaminophen 500 mg/15 mL oral liquid: 30 milliliter(s) by gastrostomy tube every 6 hours as needed for  fever or pain  amLODIPine 10 mg oral tablet: 1 tab(s) by gastrostomy tube once a day  ascorbic acid 500 mg oral tablet: 1 tab(s) by gastrostomy tube once a day  BD Alcohol Single Use Swab 70% topical pad: Apply topically to affected area once a day apply to site as needed  bismuth subsalicylate 262 mg/15 mL oral suspension: 15 milliliter(s) by gastrostomy tube once a day  calamine-zinc oxide 8%-8% topical lotion: Apply topically to affected area once a day  diclofenac 1% topical gel: Apply topically to affected area every 6 hours as needed for pain Apply to both knees  or hands for joint pain  erythromycin 0.5% ophthalmic ointment: 1 application to each affected eye once a day (at bedtime)  escitalopram 10 mg oral tablet: 1 tab(s) by gastrostomy tube once a day MDD: 10mg  fentaNYL 25 mcg/hr transdermal film, extended release: 1 patch transdermally every 72 hours 1 patch per 72 hr MDD: 1 patch per 72 hours  Firvanq 25 mg/mL oral liquid: 25 milliliter(s) by gastrostomy tube once a day Give daily on 4/27 through 5/2  Give every other day on 5/3 through 5/9  Give every 3rd day on 5/10 through 5/23  gabapentin 250 mg/5 mL oral solution: 100 milligram(s) by gastrostomy tube 2 times a day give at 9AM and 9PM MDD: 200mg  guaiFENesin 100 mg/5 mL oral liquid: 10 milliliter(s) by gastrostomy tube every 6 hours as needed for  congestion  insulin glargine 100 units/mL subcutaneous solution: 12 unit(s) subcutaneous once a day give @ 6PM  ipratropium-albuterol 0.5 mg-2.5 mg/3 mL inhalation solution: 3 milliliter(s) inhaled every 6 hours ICD 10 code  J68.3 dispense 30 day supply  lactobacillus acidophilus oral capsule: 1 cap(s) by gastrostomy tube once a day  levothyroxine 125 mcg (0.125 mg) oral tablet: 1 tab(s) by gastrostomy tube once a day Give at 5AM, 1 hour before starting PEG feeds  melatonin 0.25 mg/mL oral liquid: 12 milliliter(s) by gastrostomy tube once a day (at bedtime)  Multiple Vitamins with Minerals oral liquid: 15 milliliter(s) by gastrostomy tube once a day  naloxone 4 mg/0.1 mL nasal spray: 1 spray(s) intranasally once as needed for overdose 1 spray q 2-3 minutes alternating between nostrils  NovoLIN R FlexPen 100 units/mL injectable solution: 27 unit(s) injectable 3 times a day Give 11units @ 6AM, Give 9units @ 12PM, Give 7units @ 1800  nystatin 100,000 units/g topical powder: Apply topically to affected area 2 times a day  oxyCODONE 5 mg/5 mL oral solution: 5 milliliter(s) by gastrostomy tube every 6 hours as needed for Moderate Pain (4 - 6) MDD: 20mg  oxyCODONE 5 mg/5 mL oral solution: 10 milliliter(s) by gastrostomy tube every 6 hours as needed for Severe Pain (7 - 10) MDD: 40ml  pantoprazole 40 mg oral granule, delayed release: 40 milligram(s) by gastrostomy tube once a day  prednisoLONE acetate 1% ophthalmic suspension: 1 drop(s) to each affected eye 4 times a day apply to both eyes  predniSONE 5 mg/5 mL oral solution: 5 milliliter(s) by gastrostomy tube once a day  Seroquel 25 mg oral tablet: 0.5 tab(s) by gastrostomy tube once a day Given at 3PM while in hospital  simethicone 80 mg oral tablet, chewable: 1 tab(s) by gastrostomy tube every 6 hours  stomahesive paste: apply to wound bed daily  stomahesive powder: apply to sacral wound daily  Systane Complete Preservative Free ophthalmic solution: 1 drop(s) to each affected eye every 2 hours apply to both eyes   acetaminophen 500 mg/15 mL oral liquid: 30 milliliter(s) by gastrostomy tube every 6 hours as needed for  fever or pain  amLODIPine 10 mg oral tablet: 1 tab(s) by gastrostomy tube once a day  ascorbic acid 500 mg oral tablet: 1 tab(s) by gastrostomy tube once a day  BD Alcohol Single Use Swab 70% topical pad: Apply topically to affected area once a day apply to site as needed  bismuth subsalicylate 262 mg/15 mL oral suspension: 15 milliliter(s) by gastrostomy tube once a day  calamine-zinc oxide 8%-8% topical lotion: Apply topically to affected area once a day  diclofenac 1% topical gel: Apply topically to affected area every 6 hours as needed for pain Apply to both knees  or hands for joint pain  erythromycin 0.5% ophthalmic ointment: 1 application to each affected eye once a day (at bedtime)  escitalopram 10 mg oral tablet: 1 tab(s) by gastrostomy tube once a day MDD: 10mg  fentaNYL 25 mcg/hr transdermal film, extended release: 1 patch transdermally every 72 hours 1 patch per 72 hr MDD: 1 patch per 72 hours  Firvanq 25 mg/mL oral liquid: 25 milliliter(s) by gastrostomy tube once a day Give daily on 4/27 through 5/2  Give every other day on 5/3 through 5/9  Give every 3rd day on 5/10 through 5/23  gabapentin 250 mg/5 mL oral solution: 100 milligram(s) by gastrostomy tube 2 times a day give at 9AM and 9PM MDD: 200mg  guaiFENesin 100 mg/5 mL oral liquid: 10 milliliter(s) by gastrostomy tube every 6 hours as needed for  congestion  hollihesive skin barrier: apply to sacral wound  insulin glargine 100 units/mL subcutaneous solution: 12 unit(s) subcutaneous once a day give @ 6PM  ipratropium-albuterol 0.5 mg-2.5 mg/3 mL inhalation solution: 3 milliliter(s) inhaled every 6 hours ICD 10 code  J68.3 dispense 30 day supply  lactobacillus acidophilus oral capsule: 1 cap(s) by gastrostomy tube once a day  levothyroxine 125 mcg (0.125 mg) oral tablet: 1 tab(s) by gastrostomy tube once a day Give at 5AM, 1 hour before starting PEG feeds  melatonin 0.25 mg/mL oral liquid: 12 milliliter(s) by gastrostomy tube once a day (at bedtime)  Multiple Vitamins with Minerals oral liquid: 15 milliliter(s) by gastrostomy tube once a day  naloxone 4 mg/0.1 mL nasal spray: 1 spray(s) intranasally once as needed for overdose 1 spray q 2-3 minutes alternating between nostrils  NovoLIN R FlexPen 100 units/mL injectable solution: 27 unit(s) injectable 3 times a day Give 11units @ 6AM, Give 9units @ 12PM, Give 7units @ 1800  nystatin 100,000 units/g topical powder: Apply topically to affected area 2 times a day  oxyCODONE 5 mg/5 mL oral solution: 5 milliliter(s) by gastrostomy tube every 6 hours as needed for Moderate Pain (4 - 6) MDD: 20mg  oxyCODONE 5 mg/5 mL oral solution: 10 milliliter(s) by gastrostomy tube every 6 hours as needed for Severe Pain (7 - 10) MDD: 40ml  pantoprazole 40 mg oral granule, delayed release: 40 milligram(s) by gastrostomy tube once a day  prednisoLONE acetate 1% ophthalmic suspension: 1 drop(s) to each affected eye 4 times a day apply to both eyes  predniSONE 5 mg/5 mL oral solution: 5 milliliter(s) by gastrostomy tube once a day  Seroquel 25 mg oral tablet: 0.5 tab(s) by gastrostomy tube once a day Given at 3PM while in hospital  simethicone 80 mg oral tablet, chewable: 1 tab(s) by gastrostomy tube every 6 hours  stomahesive paste: apply to wound bed daily  stomahesive powder: apply to sacral wound daily  Systane Complete Preservative Free ophthalmic solution: 1 drop(s) to each affected eye every 2 hours apply to both eyes   acetaminophen 500 mg/15 mL oral liquid: 30 milliliter(s) by gastrostomy tube every 6 hours as needed for  fever or pain  amLODIPine 10 mg oral tablet: 1 tab(s) by gastrostomy tube once a day  ascorbic acid 500 mg oral tablet: 1 tab(s) by gastrostomy tube once a day  BD Alcohol Single Use Swab 70% topical pad: Apply topically to affected area once a day apply to site as needed  bismuth subsalicylate 262 mg/15 mL oral suspension: 15 milliliter(s) by gastrostomy tube once a day  calamine-zinc oxide 8%-8% topical lotion: Apply topically to affected area once a day  diclofenac 1% topical gel: Apply topically to affected area every 6 hours Apply to both knees  or hands for joint pain  erythromycin 0.5% ophthalmic ointment: 1 application to each affected eye once a day (at bedtime)  escitalopram 10 mg oral tablet: 1 tab(s) by gastrostomy tube once a day MDD: 10mg  fentaNYL 25 mcg/hr transdermal film, extended release: 1 patch transdermally every 72 hours 1 patch per 72 hr MDD: 1 patch per 72 hours  Firvanq 25 mg/mL oral liquid: 25 milliliter(s) by gastrostomy tube once a day Give daily on 4/27 through 5/2  Give every other day on 5/3 through 5/9  Give every 3rd day on 5/10 through 5/23  gabapentin 250 mg/5 mL oral solution: 100 milligram(s) by gastrostomy tube 2 times a day give at 9AM and 9PM MDD: 200mg  guaiFENesin 100 mg/5 mL oral liquid: 10 milliliter(s) by gastrostomy tube every 6 hours as needed for  congestion  hollihesive skin barrier: apply to sacral wound every 3-5 days or as needed when soiled  insulin glargine 100 units/mL subcutaneous solution: 12 unit(s) subcutaneous once a day give @ 6PM  ipratropium-albuterol 0.5 mg-2.5 mg/3 mL inhalation solution: 3 milliliter(s) inhaled every 6 hours ICD 10 code  J68.3 dispense 30 day supply  lactobacillus acidophilus oral capsule: 1 cap(s) by gastrostomy tube once a day  levothyroxine 125 mcg (0.125 mg) oral tablet: 1 tab(s) by gastrostomy tube once a day Give at 5AM, 1 hour before starting PEG feeds  melatonin 0.25 mg/mL oral liquid: 12 milliliter(s) by gastrostomy tube once a day (at bedtime)  Multiple Vitamins with Minerals oral liquid: 15 milliliter(s) by gastrostomy tube once a day  naloxone 4 mg/0.1 mL nasal spray: 1 spray(s) intranasally once as needed for overdose 1 spray q 2-3 minutes alternating between nostrils  NovoLIN R FlexPen 100 units/mL injectable solution: 27 unit(s) injectable 3 times a day Give 11units @ 6AM, Give 9units @ 12PM, Give 7units @ 1800  nystatin 100,000 units/g topical powder: Apply topically to affected area 2 times a day  oxyCODONE 5 mg/5 mL oral solution: 5 milliliter(s) by gastrostomy tube every 6 hours as needed for Moderate Pain (4 - 6) MDD: 20mg  oxyCODONE 5 mg/5 mL oral solution: 10 milliliter(s) by gastrostomy tube every 6 hours as needed for Severe Pain (7 - 10) MDD: 40ml  pantoprazole 40 mg oral granule, delayed release: 40 milligram(s) by gastrostomy tube once a day  prednisoLONE acetate 1% ophthalmic suspension: 1 drop(s) to each affected eye 4 times a day apply to both eyes  predniSONE 5 mg/5 mL oral solution: 5 milliliter(s) by gastrostomy tube once a day  Seroquel 25 mg oral tablet: 0.5 tab(s) by gastrostomy tube once a day Given at 3PM while in hospital  simethicone 80 mg oral tablet, chewable: 1 tab(s) by gastrostomy tube every 6 hours  sputum culture collection bottles: for drainage from [place] on PEG-J MDD: 1  stomahesive paste: apply to wound bed daily  stomahesive powder: apply to sacral wound daily  Systane Complete Preservative Free ophthalmic solution: 1 drop(s) to each affected eye every 2 hours apply to both eyes

## 2024-04-26 NOTE — DISCHARGE NOTE PROVIDER - DISCHARGE DIET
Pureed Diet/Enteral, NPO with Tube Feeds Pureed Diet/Enteral, NPO with Tube Feeds/Other Diet Instructions

## 2024-04-26 NOTE — PROGRESS NOTE ADULT - ASSESSMENT
The patient is a 74y Female with PMH of T2DM on insulin, HTN, HLD, hypothyroidism, RA on Prednisone, Fibromyalgia, cerebral aneurysm s/p repair, acute hypercapnic respiratory failure s/p trach (vent dependent) and PEG (10/22), bedbound presented from home with complaints of possible G tube dislodgement and redness around the site, now s/p replacement. Endocrinology consulted for uncontrolled T2DM.    #Uncontrolled Type 2 Diabetes Mellitus   #Hyperglycemia on Tube feeds  - Follows with: Dr Rupali Ruth, endocrinology.   - A1C with Estimated Average Glucose Result: 6.2 % (04-04-24)  - Home regimen: Patient is on TF from 6AM-6PM. Takes Lantus 15 units qhs and regular insulin 10 units at 6AM, 12 units at 12PM, and 6 units at 6PM. She is on prednisone 5mg daily for RA  - eGFR: 113 mL/min/1.73m2 (04-19-24)  - Weight (kg): 48.1 (04-16-24)  - current tube feeds: [place] 1.5 at 55 cc/hr 6AM-midnight. unclear what final home TF will be on discharge per primary team  - 4/26 Last 24 hour BGs mostly at goal except BG at noon ( 200s )  -  INPATIENT PLAN:  - continue Lantus 12 units at 6PM  - Adjust  Admelog to Regular insulin (longer acting)  9 units at 0600, increase Regular insulin to 9 units at 1200, continue 7 units at 1800,  NO DOSE AT MN !(HOLD if tube feeds are stopped).  - C/w mod dose admelog correction scale to moderate dose Regular insulin scale q6h  - Please check FSG q6h   - Inpatient glucose goals: 100-180      DISCHARGE PLANNING:  - Discharge recs pending clinical course and tube feeds on discharge: likely c/w lantus plus regular insulin at 6AM, 12PM, 6PM  - followup with Dr Ruth: Endocrinology [facility]: [address] [place] [address]. [address]. [place] [address]. Tel: [phone]    #Secondary adrenal insufficiency  - 2/2 long term use of prednisone  - c/w prednisone 5mg daily    #Hypothyroidism  - home regimen LT4 125 mcg daily  - Continue home dose of LT5 125 mcg PO daily. Please administer in the AM on an empty stomach, 1 hour before food or other medications, and 4 hours before any PPI, calcium, or iron supplements. Can give at 5AM since tube feeds start at 6AM.    - check TSH and free T4    #Hypertension  - Goal BP <130/80  - on amlodipine  - Management as per primary team  - check urine microalbumin level as outpatient    #Hyperlipidemia  - LDL goal <70  - Last LDL: 63 mg/dL (04-04-24)  - consider mod dose statin if no contraindication  - check lipid panel as outpatient on a yearly basis    Contact via [employer] Teams during business hours  To reach covering provider access AMION via [employer]  For Urgent matters/after-hours/weekends/holidays please page endocrine fellow on call   For nonurgent matters please email [email or web address]    Please note that this patient may be followed by different provider tomorrow.  Notify endocrine 24 hours prior to discharge for final recommendations

## 2024-04-26 NOTE — PROGRESS NOTE ADULT - SUBJECTIVE AND OBJECTIVE BOX
Patient is a 74y old  Female who presents with a chief complaint of Dislodged G Tube (25 Apr 2024 15:23)      Interval Events:    REVIEW OF SYSTEMS:  [ ] Positive  [ ] All other systems negative  [ ] Unable to assess ROS because ________    Vital Signs Last 24 Hrs  T(C): 37.2 (04-26-24 @ 05:40), Max: 37.2 (04-26-24 @ 05:40)  T(F): 99 (04-26-24 @ 05:40), Max: 99 (04-26-24 @ 05:40)  HR: 82 (04-26-24 @ 06:34) (74 - 100)  BP: 119/54 (04-26-24 @ 05:40) (116/54 - 132/66)  RR: 16 (04-26-24 @ 05:40) (16 - 18)  SpO2: 100% (04-26-24 @ 06:34) (99% - 100%)    PHYSICAL EXAM:  HEENT:   [ ]Tracheostomy:  [ ]Pupils equal  [ ]No oral lesions  [ ]Abnormal    SKIN  [ ]No Rash  [ ] Abnormal  [ ] pressure    CARDIAC  [ ]Regular  [ ]Abnormal    PULMONARY  [ ]Bilateral Clear Breath Sounds  [ ]Normal Excursion  [ ]Abnormal    GI  [ ]PEG      [ ] +BS		              [ ]Soft, nondistended, nontender	  [ ]Abnormal    MUSCULOSKELETAL                                   [ ]Bedbound                 [ ]Abnormal    [ ]Ambulatory/OOB to chair                           EXTREMITIES                                         [ ]Normal  [ ]Edema                           NEUROLOGIC  [ ] Normal, non focal  [ ] Focal findings:    PSYCHIATRIC  [ ]Alert and appropriate  [ ] Sedated	 [ ]Agitated    :  Mcbride: [ ] Yes, if yes: Date of Placement:                   [  ] No    LINES: Central Lines [ ] Yes, if yes: Date of Placement                                     [  ] No    HOSPITAL MEDICATIONS:  MEDICATIONS  (STANDING):  albuterol/ipratropium for Nebulization 3 milliLiter(s) Nebulizer every 6 hours  amLODIPine   Tablet 10 milliGRAM(s) Oral <User Schedule>  artificial tears (preservative free) Ophthalmic Solution 1 Drop(s) Both EYES every 2 hours  ascorbic acid 500 milliGRAM(s) Oral daily  Biotene Dry Mouth Oral Rinse 5 milliLiter(s) Swish and Spit every 6 hours  chlorhexidine 0.12% Liquid 15 milliLiter(s) Oral Mucosa every 12 hours  chlorhexidine 4% Liquid 1 Application(s) Topical <User Schedule>  cholecalciferol 2000 Unit(s) Oral daily  diclofenac sodium 1% Gel 4 Gram(s) Topical four times a day  erythromycin   Ointment 1 Application(s) Both EYES at bedtime  escitalopram 10 milliGRAM(s) Oral <User Schedule>  gabapentin Solution 100 milliGRAM(s) Oral <User Schedule>  insulin glargine Injectable (LANTUS) 12 Unit(s) SubCutaneous <User Schedule>  insulin regular  human corrective regimen sliding scale   SubCutaneous every 6 hours  insulin regular  human recombinant 7 Unit(s) SubCutaneous <User Schedule>  insulin regular  human recombinant 10 Unit(s) SubCutaneous <User Schedule>  levothyroxine 125 MICROGram(s) Oral daily  lidocaine   4% Patch 1 Patch Transdermal at bedtime  lidocaine   4% Patch 1 Patch Transdermal at bedtime  melatonin Liquid 3 milliGRAM(s) Oral at bedtime  multivitamin/minerals/iron Oral Solution (CENTRUM) 15 milliLiter(s) Oral daily  pantoprazole  Injectable 40 milliGRAM(s) IV Push daily  prednisoLONE acetate 1% Suspension 1 Drop(s) Both EYES four times a day  predniSONE  Solution 5 milliGRAM(s) Oral <User Schedule>  QUEtiapine 12.5 milliGRAM(s) Oral <User Schedule>  simethicone 80 milliGRAM(s) Chew every 6 hours  surgical lubricant sterile 1 Application(s) Topical every 8 hours  vancomycin    Solution 125 milliGRAM(s) Enteral Tube daily    MEDICATIONS  (PRN):  calamine/zinc oxide Lotion 1 Application(s) Topical daily PRN Rash and/or Itching  ondansetron Injectable 4 milliGRAM(s) IV Push every 8 hours PRN Nausea and/or Vomiting  oxyCODONE    Solution 5 milliGRAM(s) Enteral Tube every 4 hours PRN Moderate Pain (4 - 6)  oxyCODONE    Solution 10 milliGRAM(s) Enteral Tube every 6 hours PRN Severe Pain (7 - 10)  sodium chloride 0.65% Nasal 1 Spray(s) Both Nostrils every 12 hours PRN Congestion      LABS:                  CAPILLARY BLOOD GLUCOSE    MICROBIOLOGY:     RADIOLOGY:  [ ] Reviewed and interpreted by me    Mode: AC/ CMV (Assist Control/ Continuous Mandatory Ventilation)  RR (machine): 12  TV (machine): 350  FiO2: 30  PEEP: 5  ITime: 1  MAP: 8  PIP: 25   Patient is a 74y old  Female who presents with a chief complaint of Dislodged G Tube (25 Apr 2024 15:23)      Interval Events: No events reported over night.     REVIEW OF SYSTEMS:  [X] Positive: Diarrhea  [ ] All other systems negative  [ ] Unable to assess ROS because __    Vital Signs Last 24 Hrs  T(C): 37.2 (04-26-24 @ 05:40), Max: 37.2 (04-26-24 @ 05:40)  T(F): 99 (04-26-24 @ 05:40), Max: 99 (04-26-24 @ 05:40)  HR: 82 (04-26-24 @ 06:34) (74 - 100)  BP: 119/54 (04-26-24 @ 05:40) (116/54 - 132/66)  RR: 16 (04-26-24 @ 05:40) (16 - 18)  SpO2: 100% (04-26-24 @ 06:34) (99% - 100%)      PHYSICAL EXAM:  HEENT:   [X] Tracheostomy:  #8 distal XLT Cuffed Shiley  [X] PERRL B/L; EOMI; eyes red L>R (improving)  [X] No oral lesions  [ ] Abnormal    SKIN  [ ] No Rash  [ ] Abnormal  [X] pressure: B/L buttocks/sacral deep tissue injury     CARDIAC  [X] Regular  [ ]Abnormal    PULMONARY  [X] Bilateral Clear Breath Sounds  [ ] Normal Excursion  [ ] Abnormal    GI  [X] PEG-J      [X] +BS		              [X] Soft, nondistended, nontender	  [X] Abnormal:  Original PEG stoma appears to be healing and smaller, site otherwise clean without erythema or purulence    MUSCULOSKELETAL                                   [X] Bedbound                 [ ] Abnormal    [ ] Ambulatory/OOB to chair                           EXTREMITIES                                         [X] Normal  [ ] Edema                       NEUROLOGIC  [ ] Normal, non focal  [X] Focal findings:  Alert and Oriented x3; responds to questions by mouthing words; +gag reflex/cough; no facial asymmetry observed; moves all distal limbs upon request; B/L plantar flexion    PSYCHIATRIC  [X] Lethargic but arousable, affect appropriate  [ ] Sedated	 [ ]Agitated    :  Mcbride: [ ] Yes, if yes: Date of Placement:                   [X ] No    LINES: Central Lines [ ] Yes, if yes: Date of Placement                                     [X] No          HOSPITAL MEDICATIONS:  MEDICATIONS  (STANDING):  albuterol/ipratropium for Nebulization 3 milliLiter(s) Nebulizer every 6 hours  amLODIPine   Tablet 10 milliGRAM(s) Oral <User Schedule>  artificial tears (preservative free) Ophthalmic Solution 1 Drop(s) Both EYES every 2 hours  ascorbic acid 500 milliGRAM(s) Oral daily  Biotene Dry Mouth Oral Rinse 5 milliLiter(s) Swish and Spit every 6 hours  chlorhexidine 0.12% Liquid 15 milliLiter(s) Oral Mucosa every 12 hours  chlorhexidine 4% Liquid 1 Application(s) Topical <User Schedule>  cholecalciferol 2000 Unit(s) Oral daily  diclofenac sodium 1% Gel 4 Gram(s) Topical four times a day  erythromycin   Ointment 1 Application(s) Both EYES at bedtime  escitalopram 10 milliGRAM(s) Oral <User Schedule>  gabapentin Solution 100 milliGRAM(s) Oral <User Schedule>  insulin glargine Injectable (LANTUS) 12 Unit(s) SubCutaneous <User Schedule>  insulin regular  human corrective regimen sliding scale   SubCutaneous every 6 hours  insulin regular  human recombinant 7 Unit(s) SubCutaneous <User Schedule>  insulin regular  human recombinant 10 Unit(s) SubCutaneous <User Schedule>  levothyroxine 125 MICROGram(s) Oral daily  lidocaine   4% Patch 1 Patch Transdermal at bedtime  lidocaine   4% Patch 1 Patch Transdermal at bedtime  melatonin Liquid 3 milliGRAM(s) Oral at bedtime  multivitamin/minerals/iron Oral Solution (CENTRUM) 15 milliLiter(s) Oral daily  pantoprazole  Injectable 40 milliGRAM(s) IV Push daily  prednisoLONE acetate 1% Suspension 1 Drop(s) Both EYES four times a day  predniSONE  Solution 5 milliGRAM(s) Oral <User Schedule>  QUEtiapine 12.5 milliGRAM(s) Oral <User Schedule>  simethicone 80 milliGRAM(s) Chew every 6 hours  surgical lubricant sterile 1 Application(s) Topical every 8 hours  vancomycin    Solution 125 milliGRAM(s) Enteral Tube daily    MEDICATIONS  (PRN):  calamine/zinc oxide Lotion 1 Application(s) Topical daily PRN Rash and/or Itching  ondansetron Injectable 4 milliGRAM(s) IV Push every 8 hours PRN Nausea and/or Vomiting  oxyCODONE    Solution 5 milliGRAM(s) Enteral Tube every 4 hours PRN Moderate Pain (4 - 6)  oxyCODONE    Solution 10 milliGRAM(s) Enteral Tube every 6 hours PRN Severe Pain (7 - 10)  sodium chloride 0.65% Nasal 1 Spray(s) Both Nostrils every 12 hours PRN Congestion      LABS:                  CAPILLARY BLOOD GLUCOSE    MICROBIOLOGY:     RADIOLOGY:  [ ] Reviewed and interpreted by me    Mode: AC/ CMV (Assist Control/ Continuous Mandatory Ventilation)  RR (machine): 12  TV (machine): 350  FiO2: 30  PEEP: 5  ITime: 1  MAP: 8  PIP: 25

## 2024-04-26 NOTE — DISCHARGE NOTE PROVIDER - NSDCFUADDINST_GEN_ALL_CORE_FT
Patient can receive benadryl as needed for itching or skin rash.   Patient can utilize cough assist device as needed and undergo manual chest PT as able.   Multi-vitamin and iron supplements should not be given with thyroid medication or with gabapentin.  Should be given separate by at least 2 hours.  Patient may be given kefir to the discretion of daughter.  Can be given orally or via PEG followed by 20-30ml piston water flush via PEG.    PEG care:  Stoma is healing however remains patent with mild amount of gastric output.  Can wipe area with water, pat dry and cover with gauze.  Trach care:   Patient can receive benadryl as needed for itching or skin rash.   Patient can utilize cough assist device as needed and undergo manual chest PT as able.   Multi-vitamin and iron supplements should not be given with thyroid medication or with gabapentin.  Should be given separate by at least 2 hours.  Patient may be given kefir to the discretion of daughter.  Can be given orally or via PEG followed by 20-30ml piston water flush via PEG.    PEG care:  Stoma is healing however remains patent with mild amount of gastric output.  Can wipe area with water, pat dry and cover with gauze.  If skin observed to be irritated can apply cavilon or zinc oxide as a protectant until stoma has closed.  For the PEG-J, can apply drain sponge under bumper if there is drainage observed.  Bumper is intended to be loose and not flush against skin.   Trach care:   Patient can receive benadryl as needed for itching or skin rash.   Patient can utilize cough assist device as needed and undergo manual chest PT as able.   Multi-vitamin and iron supplements should not be given with thyroid medication or with gabapentin.  Should be given separate by at least 2 hours.  Patient may be given kefir to the discretion of daughter.  Can be given orally or via PEG followed by 20-30ml piston water flush via PEG.    PEG care:  Old stoma is healing however remains patent with mild amount of gastric output.  Can wipe area with water, pat dry and cover with gauze/ABD pad.  If skin observed to be irritated can apply cavilon or zinc oxide as a protectant until stoma has closed.  For the PEG-J, can apply drain sponge under bumper if there is drainage observed.  Bumper is intended to be loose and not flush against skin.     sacral/buttock, left ischial injuries - cleanse with incontinence cleanswer, pat dry, apply triad ointment BID and PRN for incontinent episodes   Patient can receive benadryl as needed for itching or skin rash.   Patient can utilize cough assist device as needed and undergo manual chest PT as able.   Multi-vitamin and iron supplements should not be given with thyroid medication or with gabapentin.  Should be given separate by at least 2 hours.  Patient may be given kefir to the discretion of daughter.  Can be given orally or via PEG followed by 20-30ml piston water flush via PEG.    PEG care:  Old stoma is healing however remains patent with mild amount of gastric output.  Can wipe area with water, pat dry and cover with gauze/ABD pad.  If skin observed to be irritated can apply cavilon or zinc oxide as a protectant until stoma has closed.  For the PEG-J, can apply drain sponge under bumper if there is drainage observed.  Bumper is intended to be loose and not flush against skin.     sacral/buttock - cleanse with NS, pat dry, apply stomahesive powder, apply stomahesive paste on the wound bed, cover with a skin barrier every 3-5 days  left ischial injuries - cleanse with incontinence cleanser, pat dry, apply triad ointment BID and PRN for incontinent episodes   Patient can receive benadryl as needed for itching or skin rash.   Patient can utilize cough assist device as needed and undergo manual chest PT as able.   Multi-vitamin and iron supplements should not be given with thyroid medication or with gabapentin.  Should be given separate by at least 2 hours.  Patient may be given kefir to the discretion of daughter.  Can be given orally or via PEG followed by 20-30ml piston water flush via PEG.    PEG care:  Old stoma is healing however remains patent with mild amount of gastric output.  Can wipe area with water, pat dry and cover with gauze/ABD pad.  If skin observed to be irritated can apply cavilon or zinc oxide as a protectant until stoma has closed.  Can place G-port to gravity with emerson bag collection to prevent leakage.  For the PEG-J, can apply drain sponge under bumper if there is drainage observed.  Bumper is intended to be loose and not flush against skin.     sacral/buttock - cleanse with NS, pat dry, apply stomahesive powder, apply stomahesive paste on the wound bed, cover with a skin barrier every 3-5 days  left ischial injuries - cleanse with incontinence cleanser, pat dry, apply triad ointment BID and PRN for incontinent episodes   Patient can receive benadryl as needed for itching or skin rash.   Patient can utilize cough assist device q6hrs and as needed and can undergo manual chest PT as able.   Multi-vitamin and iron supplements should not be given with thyroid medication or with gabapentin.  Should be given separate by at least 2 hours.  Patient may be given kefir to the discretion of daughter.  Can be given orally or via PEG followed by 20-30ml piston water flush via PEG.    PEG care:  Old stoma is healing however remains patent with mild amount of gastric output.  Can wipe area with water, pat dry and cover with gauze/ABD pad.  If skin observed to be irritated can apply cavilon or zinc oxide as a protectant until stoma has closed.  Can place G-port to gravity with emerson bag or sputum collection bottle collection to prevent leakage.  For the PEG-J, can apply drain sponge over bumper if there is drainage observed.  Bumper is intended to be loose and not flush against skin.     sacral/buttock - cleanse with NS, pat dry, apply stomahesive powder, apply stomahesive paste on the wound bed, cover with a skin barrier every 3-5 days  left ischial injuries - cleanse with incontinence cleanser, pat dry, apply triad ointment BID and PRN for incontinent episodes

## 2024-04-26 NOTE — PROGRESS NOTE ADULT - ASSESSMENT
75yo woman  h/o T2DM (4/4/24: A1C = 6.2%), HTN, HLD, anemia, hypothyroidism, RA, fibromyalgia, remote cerebral aneurysm repair, acute hypercapnic respiratory failure with tracheostomy, PEG tube (initially placed 2022), and bedbound, recurrent C diff presented for PEG tube dislodgment. Admitted 4/2/24  weight = 54.5 kg    Recurrent C diff - first episode Aug 2023 treated with tapering PO vanco, recurrent episode 1/31/24 treated with PO vanco and then fidaxomicin completed in Feb - most recently tested positive 3/20/24 seen by Dr. Newman 3/29 outpatient, started on fidaxomicin that day - tube feeds concurrently held, unclear if improving afterwards per family  Chronic sacral decubitus wound  3/20 Klebisella bacteruria R only to amp and sputum few Pseudomonas R to FQs w/o polys on gram stian - treatment of urine was deferred to avoid exacerbating C diff    Tmax 100, no leukocytosis  Concern for cellulitis around PEG tube site - area with very minimal erythema, remarkable receded from skin norm placed on admission  UA 11 WBC  CT a/p: no infectious focus or colitis  MRSA PCR+    4/3 IR - PEG exchanged bedside to 20 Fr [employer]  - Bedside XR demonstrates appropriately positioned G-tube with contrast filling into the stomach and bowel.    4/2 Blood Cultures x 2 NGTD;  Positive MRSA/MSSA PCR  4/3 Urine cultures NEG  4/4 fever  4/4 Wound care assessment appreciated:   " area of persistent nonblanchable dark discoloration that is inconsistent with a patient's surrounding skin tone as well as a white juan j film noted over B/L buttocks/sacral skin, area measures approximately 10cm x 10cm x 0cm- presentation is consistent with a deep tissue injury in evolution with incontinence and fungal involvement present on admission. Within the apex of the gluteal cleft/base of sacrum there is full thickness skin loss with red, beefy tissue noted, area measures approximately 3cm x 1cm x 0.3cm- presentation is consistent with a deep tissue injury in evolution with incontinence involvement present on admission."  4/7 fever; Meropenem resumed  Positive Blood Culture x1 Serratia marcesens    4/16 OR: Gastrojejunostomy, percutaneous, endoscopic, Endoscopic assisted closure of PEG site. Endoscopic assisted percutaneous gastrojejunostomy tube placement.   4/19 K= 4.4  4/22 feeds @ 55 cc/hr  4/24 continued frequent bowel movements  - stool partially formed  4/25 Ophthalmology follow up: anterior scleritis OU  - Pt with hx of RA and sjogrens has bilateral scleral injection that is sectoral, no signs of conjunctivitis; ENT for trach exchange    Antibiotics  Meropenem 4/2 -->4/3; 4/7 --> 4/11  Cefepime 4/11 --> 4/14  IV Vanco 4/2  Fdaxomicin 4/3--> 4/8  PO Vanco 4/8 -->     Suggest:  maintain fluid and electrolytes with increased diarrhea -  Continue po Vanco  Extended po Vancomycin taper  Bezlotoxumab (Zinplava) at end of Vanco course as out pt    Vanco dosing schedule  Vancomycin 125 mg po 4 times daily 4/8 --> 4/18  Vancomycin 125 mg po 2 times daily 4/19 --> 4/25  Vancomycin 125 mg po once daily 4/26 -->5/2  Vancomycin 125 mg po once every other day 5/3 --> 5/9  Vancomycin 125 mg po once every third day 5/10 --> 5/23/24    Given multiple recurrences in context of advanced age, debility and multiple co-morbidities, administration of Bezlotoxumab (Zinplava) 500 mg over 1 hours is indicated to reduce future recurrences  Ref: Norm Wheeler M.D., Rojas Contreras M.D., Scott Griffith, Ph.D., Shiva Abraham M.D., Gabriel Falcon D.O., Pedro Weiss M.D., Talib Hampton M.D., Sena Hughes M.D., Raimundo Marte M.D., Melissa Soria M.D., Piyush Lamas M.D., Eliceo Romero M.D., Neyda Xie M.D., Jude Peters M.D., Neelima Vela B.S.M.T., Hannah Cardenas, Ph.D., Ana Quiroz, B.S., Obie Chirinos, M.S., Khushbu Flores M.D., Jean Carlos Ayala M.D., and Kristin Lopez, Ph.D., for the MODIFY I and MODIFY II Investigators*    Bezlotoxumab is suggested in IDSA guidelines -  Ref: Clinical Practice Guidelines for the Management of Clostridioides difficile Infection in Adults: 2021 Update by SHEA/IDSA  Published VERNA, 6/14/2021; Clinical Infectious Diseases, [id number], https://doi.org/10.1093/verna/ntrn300    Zinplava to be administered at home at end of vanco taper around 5/23/24    significance of current diarrhea unclear  - Cidff v tube feeds    Suggest  replace rectal tube  add Florastor, provide Activia yogurt, consider Kaopectate  Please repeat CBC  ( - if continued diarrhea and leukocytosis would increased Vanco back to q 6h)    please call ID if needed over weekend

## 2024-04-26 NOTE — DISCHARGE NOTE PROVIDER - HOSPITAL COURSE
73 yo F PMH T2DM on insulin, HTN, HLD, hypothyroidism, RA on Prednisone, Fibromyalgia, cerebral aneurysm s/p repair, chronic hypercapnic respiratory failure s/p trach (vent dependent) and Chronic PEG 2022, recurrent C. diff infection (follows with Dr. Newman) , bedbound who presented from home with G tube dislodgement and redness around the site. Had CT Abdomen/Pelvis done showing dislodgement of G tube with balloon in the anterior abdominal wall with air filled track to stomach. Admitted to MICU for ventilator management and given vancomycin/meropenem empirically for treatment of abd wall cellulitis. Per family patient has had Known c diff infection for past 2-3 weeks and was being treated with Fidaxomicin, which completed inpatient.  IR team exchanged PEG on 4/3 however later in the day it remained with leakage.  IR Attending adjusted PEG at bedside on 4/4 and PEG remained with moderate amount of PEG leakage once feeds initiated at reduced rate.  GI team re-consulted as second opinion for PEG management.  On 4/9 PEG was found out of place, a emerson catheter was placed in the stoma to maintain patency.  Patient was planned for PEG revision with both Surgery and GI team involved on 4/11 however patient had fevers up to 102F and procedure was cancelled for infectious work-up.  An 18 Fr PEG tube placed at bedside on 4/12 (original PEG size was 20Fr) as stoma site appears to have closed. Antibiotics were switched from Meropenem to Cefepime, completed 4/14. Patient now s/p PEG-J and closure of gastric fistula with GI and surgery in endo suite on 4/16.  New stoma site with no leakage, pt has been tolerating feeds.  A FEES was performed on 4/23 with recs for comfort feeds with parameters as per SLP.  Pt to continue vanco taper until 05/23 for c diff treatment with outpatient follow up with Dr. Newman.  Continuing to pressure support as tolerated. Ms. Woods is a 74 ear old woman PMH T2DM on insulin, HTN, HLD, hypothyroidism, RA on Prednisone, Fibromyalgia, cerebral aneurysm s/p repair, chronic hypercapnic respiratory failure s/p trach (vent dependent) and Chronic PEG 2022, recurrent C. diff infection (follows with Dr. Newman) , bedbound who presented from home with G tube dislodgement and redness around the site. Had CT Abdomen/Pelvis done showing dislodgement of G tube with balloon in the anterior abdominal wall with air filled track to stomach. Admitted to MICU for ventilator management and given vancomycin/meropenem empirically for treatment of abd wall cellulitis. Per family patient has had Known c diff infection for past 2-3 weeks and was being treated with Fidaxomicin, which completed inpatient.  IR team exchanged PEG on 4/3 however later in the day it remained with leakage.  IR Attending adjusted PEG at bedside on 4/4 and PEG remained with moderate amount of PEG leakage once feeds initiated at reduced rate.  GI team re-consulted as second opinion for PEG management.  On 4/9 PEG was found out of place, a emerson catheter was placed in the stoma to maintain patency.  Patient was planned for PEG revision with both Surgery and GI team involved on 4/11 however patient had fevers up to 102F and procedure was cancelled for infectious work-up.  An 18 Fr PEG tube placed at bedside on 4/12 (original PEG size was 20Fr) as stoma site appears to have closed. Antibiotics were switched from Meropenem to Cefepime, completed 4/14. Patient now s/p PEG-J and closure of gastric fistula with GI and surgery in endo suite on 4/16.  New stoma site with no leakage, pt has been tolerating feeds.  A FEES was performed on 4/23 with recommendations for comfort feeds with parameters as per SLP.  Patient to continue vancomycin taper until 05/23 for c diff treatment with outpatient follow up with Dr. Newman.  Can continuing pressure support weaning trial as tolerated as outpatient. Ms. Woods is a 74 ear old woman PMH T2DM on insulin, HTN, HLD, hypothyroidism, RA on Prednisone, Fibromyalgia, cerebral aneurysm s/p repair, chronic hypercapnic respiratory failure s/p trach (vent dependent) and Chronic PEG 2022, recurrent C. diff infection (follows with Dr. Newman) , bedbound who presented from home with G tube dislodgement and redness around the site. Had CT Abdomen/Pelvis done showing dislodgement of G tube with balloon in the anterior abdominal wall with air filled track to stomach. Admitted to MICU for ventilator management and given vancomycin/meropenem empirically for treatment of abd wall cellulitis. Per family patient has had Known c diff infection for past 2-3 weeks and was being treated with Fidaxomicin, which completed inpatient.  IR team exchanged PEG on 4/3 however later in the day it remained with leakage.  IR Attending adjusted PEG at bedside on 4/4 and PEG remained with moderate amount of PEG leakage once feeds initiated at reduced rate.  GI team re-consulted as second opinion for PEG management.  On 4/9 PEG was found out of place, a emerson catheter was placed in the stoma to maintain patency.  Patient was planned for PEG revision with both Surgery and GI team involved on 4/11 however patient had fevers up to 102F and procedure was cancelled for infectious work-up.  An 18 Fr PEG tube placed at bedside on 4/12 (original PEG size was 20Fr) as stoma site appears to have closed. Antibiotics were switched from Meropenem to Cefepime, completed 4/14. Patient now s/p PEG-J and closure of gastric fistula with GI and surgery in endo suite on 4/16.  New stoma site with no leakage, pt has been tolerating feeds.  A FEES was performed on 4/23 with recommendations for comfort feeds with parameters as per SLP.  Patient to continue vancomycin taper until 05/23 for c diff treatment with outpatient follow up with Dr. Newman.  Can continuing pressure support weaning trial as tolerated as outpatient.  Pt medically stable for discharge home.

## 2024-04-26 NOTE — PROGRESS NOTE ADULT - PROBLEM SELECTOR PROBLEM 2
Epidermal Closure Graft Donor Site (Optional): simple interrupted Chronic respiratory failure with hypercapnia

## 2024-04-26 NOTE — PROGRESS NOTE ADULT - SUBJECTIVE AND OBJECTIVE BOX
INTERVAL HPI/OVERNIGHT EVENTS:    no new gi events     MEDICATIONS  (STANDING):  albuterol/ipratropium for Nebulization 3 milliLiter(s) Nebulizer every 6 hours  amLODIPine   Tablet 10 milliGRAM(s) Oral <User Schedule>  artificial tears (preservative free) Ophthalmic Solution 1 Drop(s) Both EYES every 2 hours  ascorbic acid 500 milliGRAM(s) Oral daily  Biotene Dry Mouth Oral Rinse 5 milliLiter(s) Swish and Spit every 6 hours  chlorhexidine 0.12% Liquid 15 milliLiter(s) Oral Mucosa every 12 hours  chlorhexidine 4% Liquid 1 Application(s) Topical <User Schedule>  cholecalciferol 2000 Unit(s) Oral daily  diclofenac sodium 1% Gel 4 Gram(s) Topical four times a day  erythromycin   Ointment 1 Application(s) Both EYES at bedtime  escitalopram 10 milliGRAM(s) Oral <User Schedule>  gabapentin Solution 100 milliGRAM(s) Oral <User Schedule>  insulin glargine Injectable (LANTUS) 12 Unit(s) SubCutaneous <User Schedule>  insulin regular  human corrective regimen sliding scale   SubCutaneous every 6 hours  insulin regular  human recombinant 7 Unit(s) SubCutaneous <User Schedule>  insulin regular  human recombinant 9 Unit(s) SubCutaneous <User Schedule>  levothyroxine 125 MICROGram(s) Oral daily  lidocaine   4% Patch 1 Patch Transdermal at bedtime  lidocaine   4% Patch 1 Patch Transdermal at bedtime  melatonin Liquid 3 milliGRAM(s) Oral at bedtime  multivitamin/minerals/iron Oral Solution (CENTRUM) 15 milliLiter(s) Oral daily  pantoprazole  Injectable 40 milliGRAM(s) IV Push daily  prednisoLONE acetate 1% Suspension 1 Drop(s) Both EYES four times a day  predniSONE  Solution 5 milliGRAM(s) Oral <User Schedule>  QUEtiapine 12.5 milliGRAM(s) Oral <User Schedule>  simethicone 80 milliGRAM(s) Chew every 6 hours  surgical lubricant sterile 1 Application(s) Topical every 8 hours  vancomycin    Solution 125 milliGRAM(s) Enteral Tube daily    MEDICATIONS  (PRN):  acetaminophen   Oral Liquid .. 1000 milliGRAM(s) Enteral Tube every 6 hours PRN Temp greater or equal to 38C (100.4F), Mild Pain (1 - 3)  calamine/zinc oxide Lotion 1 Application(s) Topical daily PRN Rash and/or Itching  ondansetron Injectable 4 milliGRAM(s) IV Push every 8 hours PRN Nausea and/or Vomiting  oxyCODONE    Solution 5 milliGRAM(s) Enteral Tube every 4 hours PRN Moderate Pain (4 - 6)  oxyCODONE    Solution 10 milliGRAM(s) Enteral Tube every 6 hours PRN Severe Pain (7 - 10)  sodium chloride 0.65% Nasal 1 Spray(s) Both Nostrils every 12 hours PRN Congestion      Allergies    pineapple (Unknown)  Tagamet (Unknown)  heparin (Unknown)  walnut (Unknown)  metronidazole (Rash)  penicillin (Unknown)  Lyrica (Unknown)  meropenem (Rash)  Pecan, Filbert, Hazelnut (Unknown)    Intolerances        Review of Systems:    General:  No wt loss, fevers, chills, night sweats, fatigue   Eyes:  Good vision, no reported pain  ENT:  No sore throat, pain, runny nose, dysphagia  CV:  No pain, palpitations, hypo/hypertension  Resp:  No dyspnea, cough, tachypnea, wheezing  GI:  No pain, No nausea, No vomiting, No diarrhea, No constipation, No weight loss, No fever, No pruritis, No rectal bleeding, No melena, No dysphagia  :  No pain, bleeding, incontinence, nocturia  Muscle:  No pain, weakness  Neuro:  No weakness, tingling, memory problems  Psych:  No fatigue, insomnia, mood problems, depression  Endocrine:  No polyuria, polydypsia, cold/heat intolerance  Heme:  No petechiae, ecchymosis, easy bruisability  Skin:  No rash, tattoos, scars, edema      Vital Signs Last 24 Hrs  T(C): 37.3 (26 Apr 2024 11:30), Max: 37.3 (26 Apr 2024 11:30)  T(F): 99.2 (26 Apr 2024 11:30), Max: 99.2 (26 Apr 2024 11:30)  HR: 89 (26 Apr 2024 11:37) (82 - 97)  BP: 120/58 (26 Apr 2024 11:30) (119/54 - 132/66)  BP(mean): --  RR: 18 (26 Apr 2024 11:30) (16 - 18)  SpO2: 100% (26 Apr 2024 11:37) (100% - 100%)    Parameters below as of 26 Apr 2024 11:37  Patient On (Oxygen Delivery Method): ventilator        PHYSICAL EXAM:    Constitutional: NAD  HEENT: EOMI, throat clear  Neck: No LAD, supple  Respiratory: CTA and P  Cardiovascular: S1 and S2, RRR, no M  Gastrointestinal: BS+, soft, NT/ND, neg HSM,  Extremities: No peripheral edema, neg clubbing, cyanosis  Vascular: 2+ peripheral pulses  Neurological: A/O   Psychiatric: Normal mood, normal affect  Skin: No rashes      LABS:                RADIOLOGY & ADDITIONAL TESTS:

## 2024-04-26 NOTE — PROGRESS NOTE ADULT - NS ATTEND AMEND GEN_ALL_CORE FT
Agree with above  74F PMH RA, cerebellar aneurysm s/p repair p/w dislodged PEG tube. Now with G-J tube, hospital course c/b CDAD,   - Waxing and waning mental status, less alert today, may be tired.  - Trache, on full vent 12/350/30%/+5, attempting PST, albeit 15/5  - Previously occasional leaking of contents from PEG tube site, now appears to have resolved. Minimal drainage from prior PEG site. JG tube in good position with no issues. Tolerating tube feeds.  - Monitoring off antibiotics (s/p cefepime for Serratia bacteremia) other than PO vancomycin taper for recurrent episode CDAD. Plan for dose of bezlotoxumab O/P after D/C.  - s/p FEEST, strict recommendations for pleasure feeding.  - Dispo planning continues, awaiting supplies and necessary equipment needed for discharge home.

## 2024-04-26 NOTE — PROGRESS NOTE ADULT - SUBJECTIVE AND OBJECTIVE BOX
ENT ISSUE/POD: Tracheostomy tube exchange    HPI:  74y Female with PMH of T2DM on insulin, HTN, HLD, hypothyroidism, RA on Prednisone, Fibromyalgia, cerebral aneurysm s/p repair, acute hypercapnic respiratory failure s/p trach (vent dependent) and PEG (10/22), bedbound presented from home with complaints of possible G tube dislodgement and redness around the site, now s/p replacement. ENT consulted for tracheostomy tube exchange, routine trach change performed yesterday, new #8 distal XLT cuffed trach placed. No issues overnight.       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      H/O tracheostomy      PEG (percutaneous endoscopic gastrostomy) status        Allergies    pineapple (Unknown)  Tagamet (Unknown)  heparin (Unknown)  walnut (Unknown)  metronidazole (Rash)  penicillin (Unknown)  Lyrica (Unknown)  meropenem (Rash)  Pecan, Filbert, Hazelnut (Unknown)    Intolerances      MEDICATIONS  (STANDING):  albuterol/ipratropium for Nebulization 3 milliLiter(s) Nebulizer every 6 hours  amLODIPine   Tablet 10 milliGRAM(s) Oral <User Schedule>  artificial tears (preservative free) Ophthalmic Solution 1 Drop(s) Both EYES every 2 hours  ascorbic acid 500 milliGRAM(s) Oral daily  Biotene Dry Mouth Oral Rinse 5 milliLiter(s) Swish and Spit every 6 hours  chlorhexidine 0.12% Liquid 15 milliLiter(s) Oral Mucosa every 12 hours  chlorhexidine 4% Liquid 1 Application(s) Topical <User Schedule>  cholecalciferol 2000 Unit(s) Oral daily  diclofenac sodium 1% Gel 4 Gram(s) Topical four times a day  erythromycin   Ointment 1 Application(s) Both EYES at bedtime  escitalopram 10 milliGRAM(s) Oral <User Schedule>  gabapentin Solution 100 milliGRAM(s) Oral <User Schedule>  insulin glargine Injectable (LANTUS) 12 Unit(s) SubCutaneous <User Schedule>  insulin regular  human corrective regimen sliding scale   SubCutaneous every 6 hours  insulin regular  human recombinant 10 Unit(s) SubCutaneous <User Schedule>  insulin regular  human recombinant 7 Unit(s) SubCutaneous <User Schedule>  levothyroxine 125 MICROGram(s) Oral daily  lidocaine   4% Patch 1 Patch Transdermal at bedtime  lidocaine   4% Patch 1 Patch Transdermal at bedtime  melatonin Liquid 3 milliGRAM(s) Oral at bedtime  multivitamin/minerals/iron Oral Solution (CENTRUM) 15 milliLiter(s) Oral daily  pantoprazole  Injectable 40 milliGRAM(s) IV Push daily  prednisoLONE acetate 1% Suspension 1 Drop(s) Both EYES four times a day  predniSONE  Solution 5 milliGRAM(s) Oral <User Schedule>  QUEtiapine 12.5 milliGRAM(s) Oral <User Schedule>  simethicone 80 milliGRAM(s) Chew every 6 hours  surgical lubricant sterile 1 Application(s) Topical every 8 hours  vancomycin    Solution 125 milliGRAM(s) Enteral Tube daily    MEDICATIONS  (PRN):  acetaminophen   Oral Liquid .. 1000 milliGRAM(s) Enteral Tube every 6 hours PRN Temp greater or equal to 38C (100.4F), Mild Pain (1 - 3)  calamine/zinc oxide Lotion 1 Application(s) Topical daily PRN Rash and/or Itching  ondansetron Injectable 4 milliGRAM(s) IV Push every 8 hours PRN Nausea and/or Vomiting  oxyCODONE    Solution 5 milliGRAM(s) Enteral Tube every 4 hours PRN Moderate Pain (4 - 6)  oxyCODONE    Solution 10 milliGRAM(s) Enteral Tube every 6 hours PRN Severe Pain (7 - 10)  sodium chloride 0.65% Nasal 1 Spray(s) Both Nostrils every 12 hours PRN Congestion      Social History: see consult    Family history: see consult    ROS:   unable to obtain due to pts clinical condition       Vital Signs Last 24 Hrs  T(C): 37.2 (26 Apr 2024 05:40), Max: 37.2 (26 Apr 2024 05:40)  T(F): 99 (26 Apr 2024 05:40), Max: 99 (26 Apr 2024 05:40)  HR: 82 (26 Apr 2024 06:34) (74 - 97)  BP: 119/54 (26 Apr 2024 05:40) (116/54 - 132/66)  BP(mean): --  RR: 16 (26 Apr 2024 05:40) (16 - 18)  SpO2: 100% (26 Apr 2024 06:34) (99% - 100%)    Parameters below as of 26 Apr 2024 06:34  Patient On (Oxygen Delivery Method): ventilator             PHYSICAL EXAM:  Gen: NAD  Skin: No rashes, bruises, or lesions  Head: Normocephalic, Atraumatic  Face: no edema, erythema, or fluctuance. Parotid glands soft without mass  Eyes: no scleral injection  Nose: Nares bilaterally patent, no discharge  Mouth: No Stridor / Drooling / Trismus.  Mucosa moist, tongue/uvula midline, oropharynx clear  Neck: #8 distal XLT trach in place, cuff inflated, secured with velcro tie. Flat, supple, no lymphadenopathy, trachea midline, no masses  Lymphatic: No lymphadenopathy  Resp: on vent   Neuro: facial nerve intact, no facial droop

## 2024-04-26 NOTE — PROGRESS NOTE ADULT - SUBJECTIVE AND OBJECTIVE BOX
Seen earlier today     Chief Complaint: Diabetes Mellitus follow up    INTERVAL HX: Tolerating 18 hour tube feeding  ( 6 am - MN). BGs mostly at goal except BG at 12 noon ( 200s )       Review of Systems:  asleep, unable to obtain ROS.     Allergies    pineapple (Unknown)  Tagamet (Unknown)  heparin (Unknown)  walnut (Unknown)  metronidazole (Rash)  penicillin (Unknown)  Lyrica (Unknown)  meropenem (Rash)  Pecan, Filbert, Hazelnut (Unknown)    Intolerances      MEDICATIONS  (STANDING):  albuterol/ipratropium for Nebulization 3 milliLiter(s) Nebulizer every 6 hours  amLODIPine   Tablet 10 milliGRAM(s) Oral <User Schedule>  artificial tears (preservative free) Ophthalmic Solution 1 Drop(s) Both EYES every 2 hours  ascorbic acid 500 milliGRAM(s) Oral daily  Biotene Dry Mouth Oral Rinse 5 milliLiter(s) Swish and Spit every 6 hours  chlorhexidine 0.12% Liquid 15 milliLiter(s) Oral Mucosa every 12 hours  chlorhexidine 4% Liquid 1 Application(s) Topical <User Schedule>  cholecalciferol 2000 Unit(s) Oral daily  diclofenac sodium 1% Gel 4 Gram(s) Topical four times a day  erythromycin   Ointment 1 Application(s) Both EYES at bedtime  escitalopram 10 milliGRAM(s) Oral <User Schedule>  fentaNYL   Patch  25 MICROgram(s)/Hr 1 Patch Transdermal every 72 hours  gabapentin Solution 100 milliGRAM(s) Oral <User Schedule>  insulin glargine Injectable (LANTUS) 12 Unit(s) SubCutaneous <User Schedule>  insulin regular  human corrective regimen sliding scale   SubCutaneous every 6 hours  insulin regular  human recombinant 9 Unit(s) SubCutaneous <User Schedule>  insulin regular  human recombinant 7 Unit(s) SubCutaneous <User Schedule>  lactobacillus acidophilus 1 Tablet(s) Oral daily  levothyroxine 125 MICROGram(s) Oral daily  lidocaine   4% Patch 1 Patch Transdermal at bedtime  lidocaine   4% Patch 1 Patch Transdermal at bedtime  melatonin Liquid 3 milliGRAM(s) Oral at bedtime  multivitamin/minerals/iron Oral Solution (CENTRUM) 15 milliLiter(s) Oral daily  pantoprazole  Injectable 40 milliGRAM(s) IV Push daily  prednisoLONE acetate 1% Suspension 1 Drop(s) Both EYES four times a day  predniSONE  Solution 5 milliGRAM(s) Oral <User Schedule>  QUEtiapine 12.5 milliGRAM(s) Oral <User Schedule>  simethicone 80 milliGRAM(s) Chew every 6 hours  surgical lubricant sterile 1 Application(s) Topical every 8 hours  vancomycin    Solution 125 milliGRAM(s) Enteral Tube daily        insulin glargine Injectable (LANTUS)   12 Unit(s) SubCutaneous (04-25-24 @ 18:39)    insulin regular  human corrective regimen sliding scale   6 Unit(s) SubCutaneous (04-26-24 @ 12:01)   0 Unit(s) SubCutaneous (04-26-24 @ 06:19)    insulin regular  human recombinant   7 Unit(s) SubCutaneous (04-26-24 @ 06:29)   7 Unit(s) SubCutaneous (04-25-24 @ 18:39)    insulin regular  human recombinant   9 Unit(s) SubCutaneous (04-26-24 @ 12:01)    levothyroxine   125 MICROGram(s) Oral (04-26-24 @ 06:18)    predniSONE  Solution   5 milliGRAM(s) Oral (04-26-24 @ 08:47)        PHYSICAL EXAM:  VITALS: T(C): 37.3 (04-26-24 @ 11:30)  T(F): 99.2 (04-26-24 @ 11:30), Max: 99.2 (04-26-24 @ 11:30)  HR: 84 (04-26-24 @ 16:01) (82 - 97)  BP: 120/58 (04-26-24 @ 11:30) (119/54 - 132/66)  RR:  (16 - 18)  SpO2:  (100% - 100%)  Wt(kg): --  GENERAL: Female laying in bed, in NAD  Respiratory: + trach, on ventilator support  Extremities: Warm, no edema  NEURO: asleep    LABS:  POCT Blood Glucose.: 291 mg/dL (04-26-24 @ 11:54)  POCT Blood Glucose.: 115 mg/dL (04-26-24 @ 05:36)  POCT Blood Glucose.: 143 mg/dL (04-25-24 @ 23:41)  POCT Blood Glucose.: 120 mg/dL (04-25-24 @ 17:56)  POCT Blood Glucose.: 205 mg/dL (04-25-24 @ 11:25)  POCT Blood Glucose.: 119 mg/dL (04-25-24 @ 05:59)  POCT Blood Glucose.: 196 mg/dL (04-25-24 @ 00:25)  POCT Blood Glucose.: 278 mg/dL (04-24-24 @ 18:03)  POCT Blood Glucose.: 179 mg/dL (04-24-24 @ 11:32)  POCT Blood Glucose.: 98 mg/dL (04-24-24 @ 05:49)  POCT Blood Glucose.: 220 mg/dL (04-23-24 @ 23:43)  POCT Blood Glucose.: 351 mg/dL (04-23-24 @ 17:47)              04-04 Chol 130 Direct LDL -- LDL calculated 63 HDL 20<L> Trig 295<H>  Thyroid Function Tests:  04-23 @ 04:35 TSH 0.13 FreeT4 -- T3 -- Anti TPO -- Anti Thyroglobulin Ab -- TSI --      A1C with Estimated Average Glucose Result: 6.2 % (04-04-24 @ 07:40)    Estimated Average Glucose: 131 mg/dL (04-04-24 @ 07:40)        Diet, NPO with Tube Feed:   Tube Feeding Modality: Jejunostomy  DonorsPlay Peptide 1.5 ([id number])  Total Volume for 24 Hours (mL): 990  Continuous  Starting Tube Feed Rate mL per Hour: 50  Increase Tube Feed Rate by (mL): 5     Every 24 hours  Until Goal Tube Feed Rate (mL per Hour): 55  Tube Feed Duration (in Hours): 18  Tube Feed Start Time: 06:00  Tube Feed Stop Time: 00:00  Free Water Flush  Pump   Rate (mL per Hour): 20   Frequency: Every 2 Hours  Alfred(7 Gm Arginine/7 Gm Glut/1.2 Gm HMB     Qty per Day:  2  No Carb Prosource TF     Qty per Day:  1  Banatrol TF     Qty per Day:  1  Supplement Feeding Modality:  Gastrostomy  Probiotic Yogurt/Smoothie Cans or Servings Per Day:  1       Frequency:  Two Times a day (04-26-24 @ 15:39) [Active]

## 2024-04-26 NOTE — DISCHARGE NOTE PROVIDER - NSDCHHNEEDSERVICE_GEN_ALL_CORE
Medication teaching and assessment/Observation and assessment/Ostomy care and management/Teaching and training/Wound care and assessment

## 2024-04-26 NOTE — DISCHARGE NOTE PROVIDER - NSDCCPCAREPLAN_GEN_ALL_CORE_FT
PRINCIPAL DISCHARGE DIAGNOSIS  Diagnosis: Dislodged gastrostomy tube  Assessment and Plan of Treatment: CT A/P 4/2: Dislodged G tube with balloon in the anterior abdominal wall with air filled track to stomach  - S/p bedside exchange by IR on 4/3 ( # 20 Fr Kangaroo EnFit placed )  - 4/4: PEG leaking; IR adjusted PEG at bedside  - 4/5: PEG still leaking, adjusted at bedside by IR Attending.  - 4/8-4/9 IR aborted procedure; GI Dr. Cleaning consulted for new PEG  - 4/9 PEG fell out spontaneously, Mcbride placed into tract   - 4/12: Mcbride replaced with 18Fr PEG at bedside, bumper at 8cm (intentionally loose)  - CT A/P 4/12: G-tube in the stomach/Mild inflammation thickening along tract   - 4/14 18 fr PEG was used, feeds were re-initiated, attempt failed, PEG with copious amounts of leakage  - 4/16 S/p new PEG-J and closure of gastric fistula in endo suite with GI and surgery  - Patient Tolerating feeds at Goal Rate.  - If any further issues can follow up with Dr. GIRISH Morgan        SECONDARY DISCHARGE DIAGNOSES  Diagnosis: Chronic respiratory failure with hypercapnia  Assessment and Plan of Treatment: Chronic Trach/Vent dependant 2/2 Hypercapnic Resp Failure since 10/2022   - S/p Trach # 8 Distal XLT Shiley Cuffed   -  Vent:   350/12/30%/+5   - Home vent trial performed on 4/24 ( Passed) and Home Vent Settings adjusted as per RT from [facility]   - Continue pressure support trials as tolerated  - Chest PT, Duonebs q 6 hrs      Diagnosis: Clostridium difficile diarrhea  Assessment and Plan of Treatment: - Was Being treated for C diff as outpatient with follow up with Infectious disease, Dr Newman   - Completed fidaxomicin (total 10 day course) 4/3 -->4/13  - ID recommends ZinPlava 50mg IV to be given to reduce Cdiff infections (is not carried in hospital)  - Continued enteral vanco Taper as follows: as per ID  - Vancomycin 125 mg po 4 times daily 4/8 --> 4/18    Vancomycin 125 mg po 2 times daily 4/19 --> 4/25    Vancomycin 125 mg po once daily 4/26 -->5/2    Vancomycin 125 mg po once every other day 5/3 --> 5/9    Vancomycin 125 mg po once every third day 5/10 --> 5/23/24.  - Dosing dates on prescription.  - Can follow up in Infectious Disease clinic with Dr. ARI Bell.  See contact information attached.       Diagnosis: Fever  Assessment and Plan of Treatment: - IV Vanco/meropenem x 1 on admission for concern for cellulitis  - 4/2 blood cultures negative x2, MRSA/MSSA PCR +  - 4/3 urine culture nl vinay, CT A/P 4/2: no infectious focus or colitis  - 4/7 blood cultures positive for serratia; txd w/ Meropenem 4/2-4/3 and 4/7-4/11, switched to cefepime 4/11-4/14  - Currently remains Afebrile w/o Leukocytosis off antibiotics.      Diagnosis: T2DM (type 2 diabetes mellitus)  Assessment and Plan of Treatment:     Diagnosis: Rheumatoid arthritis  Assessment and Plan of Treatment: - Patient with Hx of RA on Enbrel as outpatient ( Currently being held)   - Outpatient Rheumatologist Dr. Egan   - Pt w/ sclera icterus c/w Active RA  - Seen by Rheumatology inpatient; reccommended outpatient follow up   - Could not receive high dosr steroids due to concern for impaired wound healing of PEG-J.  - Cont prednisone 5mg daily and oxycodone prn pain.      Diagnosis: Thrombocytopenia  Assessment and Plan of Treatment: Possibly reactive in setting of antibiotics/active infection  - History of AOCD  - Patient with slightly elevated INR; S/p Vitk 10mg QD X 3 Days ( Ended 4/19)   - Transfuse for hg < 7.0 and platelets < 10k, < 20k if febrile and < 50k if bleeding ( Heme Reccs).    Diagnosis: Sacral wound  Assessment and Plan of Treatment: Sacral/bilateral Buttocks chronic stage 4 pressure injury present on admission  Left ischium chronic stage 4 pressure injury present on admission  Incontinence Dermatitis  -   Continue turning and positioning w/ offloading assistive devices as per protocol  -   Buttocks/ Sacrum / TRIAD BID /cavilon and as needed soiling     PRINCIPAL DISCHARGE DIAGNOSIS  Diagnosis: Dislodged gastrostomy tube  Assessment and Plan of Treatment: pt presenting from home with dislodged PEG  - CT A/P 4/2: Dislodged G tube with balloon in the anterior abdominal wall with air filled track to stomach  - S/p bedside exchange by IR on 4/3 ( # 20 Fr Kangaroo EnFit placed )  - 4/4: PEG leaking; IR adjusted PEG at bedside  - 4/5: PEG still leaking, adjusted at bedside by IR Attending.  - 4/8-4/9 IR aborted procedure; GI Dr. Cleaning consulted for new PEG  - 4/9 PEG fell out spontaneously, Mcbride placed into tract   - 4/12: Mcbride replaced with 18Fr PEG at bedside, bumper at 8cm (intentionally loose)  - CT A/P 4/12: G-tube in the stomach/Mild inflammation thickening along tract   - 4/14 18 fr PEG was used, feeds were re-initiated, attempt failed, PEG with copious amounts of leakage  - 4/16 S/p new PEG-J and closure of gastric fistula in endo suite with GI and surgery  - old stoma site with persistent amounts of clear liquid leakage - surgery has been following, continue to monitor, no intervention necessary - ABD pads applied to site for absorption of liquid  - tolerating feeds at Goal Rate  - If any further issues can follow up with Dr. GIRISH Morgan        SECONDARY DISCHARGE DIAGNOSES  Diagnosis: Clostridium difficile diarrhea  Assessment and Plan of Treatment: - Was Being treated for C diff as outpatient with follow up with Infectious disease, Dr Newman   - Completed fidaxomicin (total 10 day course) 4/3 -->4/13  - ID recommends ZinPlava 50mg IV to be given to reduce Cdiff infections (is not carried in hospital)  - Continued enteral vanco Taper as follows: as per ID  - Vancomycin 125 mg po 4 times daily 4/8 --> 4/18    Vancomycin 125 mg po 2 times daily 4/19 --> 4/25    Vancomycin 125 mg po once daily 4/26 -->5/2    Vancomycin 125 mg po once every other day 5/3 --> 5/9    Vancomycin 125 mg po once every third day 5/10 --> 5/23/24.  - Dosing dates on prescription.  - Can follow up in Infectious Disease clinic with Dr. 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 since 10/2022   - S/p Trach # 8 Distal XLT Shiley Cuffed   - Home Vent: volume /12/30%/+5     - Settings changed on 4/11 to 350/12/30%/+5 as she complained of dyspnea  - Follow up ABG Post adjustment WNL   - Home vent trial performed on 4/24 (Passed) and Home Vent Settings adjusted as per RT [facility]   - trach exchange #8 distal XLT cuffed to #8 distal XLT cuffed by ENT 4/25 - tracheoscopy performed showing trach in place, +tracheomalacia  - Continue PS Trials as tolerated  - Chest PT, Duonebs q 6 hrs      Diagnosis: Sacral wound  Assessment and Plan of Treatment: Sacral/bilateral Buttocks chronic stage 4 pressure injury present on admission  Left ischium chronic stage 4 pressure injury present on admission  Incontinence Dermatitis  -  Continue turning and positioning w/ offloading assistive devices as per protocol  -  Buttocks/ Sacrum / TRIAD BID /cavilon as needed for soiling    Diagnosis: T2DM (type 2 diabetes mellitus)  Assessment and Plan of Treatment: - Ademalog dcd and changed to Regular Insulin   - Continue ISS   - Monitor BGMs    Diagnosis: Thrombocytopenia  Assessment and Plan of Treatment: Possibly reactive in setting of antibiotics/active infection  - History of AOCD  - Patient with slightly elevated INR; S/p Vitk 10mg QD X 3 Days ( Ended 4/19)   - Transfuse for hg < 7.0 and platelets < 10k, < 20k if febrile and < 50k if bleeding ( Heme Reccs).    Diagnosis: Rheumatoid arthritis  Assessment and Plan of Treatment: Patient with Hx of RA on Enbrel as outpatient (Currently being held inpt)   - Outpatient Rheumatologist Dr. Egan   - Pt w/ sclera icterus c/w Active RA  - Seen by Rheumatology inpatient; recommended outpatient follow up   - Could not receive high dose steroids due to concern for impaired wound healing of PEG-J.  - Cont prednisone 5mg daily and oxycodone prn pain       PRINCIPAL DISCHARGE DIAGNOSIS  Diagnosis: Dislodged gastrostomy tube  Assessment and Plan of Treatment: pt presenting from home with dislodged PEG  - CT A/P 4/2: Dislodged G tube with balloon in the anterior abdominal wall with air filled track to stomach  - S/p bedside exchange by IR on 4/3 ( # 20 Fr Kangaroo EnFit placed )  - 4/4: PEG leaking; IR adjusted PEG at bedside  - 4/5: PEG still leaking, adjusted at bedside by IR Attending.  - 4/8-4/9 IR aborted procedure; GI Dr. Cleaning consulted for new PEG  - 4/9 PEG fell out spontaneously, Mcbride placed into tract   - 4/12: Mcbride replaced with 18Fr PEG at bedside, bumper at 8cm (intentionally loose)  - CT A/P 4/12: G-tube in the stomach/Mild inflammation thickening along tract   - 4/14 18 fr PEG was used, feeds were re-initiated, attempt failed, PEG with copious amounts of leakage  - 4/16 S/p new PEG-J and closure of gastric fistula in endo suite with GI and surgery  - old stoma site with persistent amounts of clear liquid leakage - surgery has been following, continue to monitor, no intervention necessary - ABD pads applied to site for absorption of liquid  - tolerating feeds at Goal Rate  - If any further issues can follow up with Dr. GIRISH Morgan        SECONDARY DISCHARGE DIAGNOSES  Diagnosis: Clostridium difficile diarrhea  Assessment and Plan of Treatment: - Was Being treated for C diff as outpatient with follow up with Infectious disease, Dr Newman   - Completed fidaxomicin (total 10 day course) 4/3 -->4/13  - ID recommends ZinPlava 50mg IV to be given to reduce Cdiff infections (is not carried in hospital)  - Continued enteral vanco Taper as follows: as per ID  - Vancomycin 125 mg po 4 times daily 4/8 --> 4/18    Vancomycin 125 mg po 2 times daily 4/19 --> 4/25    Vancomycin 125 mg po once daily 4/26 -->5/2    Vancomycin 125 mg po once every other day 5/3 --> 5/9    Vancomycin 125 mg po once every third day 5/10 --> 5/23/24.  - Dosing dates on prescription.  - Can follow up in Infectious Disease clinic with Dr. 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 since 10/2022   - S/p Trach # 8 Distal XLT Shiley Cuffed   - Home Vent: volume /12/30%/+5     - Settings changed on 4/11 to 350/12/30%/+5 as she complained of dyspnea  - Follow up ABG Post adjustment WNL   - Home vent trial performed on 4/24 (Passed) and Home Vent Settings adjusted as per RT from [facility]   - trach exchange #8 distal XLT cuffed to #8 distal XLT cuffed by ENT 4/25 - tracheoscopy performed showing trach in place, +tracheomalacia  - Continue PS Trials as tolerated  - Chest PT, Duonebs q 6 hrs      Diagnosis: Sacral wound  Assessment and Plan of Treatment: Sacral/bilateral Buttocks chronic stage 4 pressure injury present on admission - cleanse with NS, pat dry, apply stomahesive powder, apply stomahesive paste on the wound bed, cover with a skin barrier every 3-5 days  -Left ischium chronic stage 4 pressure injury present on admission  Incontinence Dermatitis - cleanse with incontinence cleanser, pat dry, apply triad ointment BID and PRN for incontinent episodes  -  Continue turning and positioning w/ offloading assistive devices as per protocol  -  Buttocks/ Sacrum / TRIAD BID /cavilon as needed for soiling    Diagnosis: T2DM (type 2 diabetes mellitus)  Assessment and Plan of Treatment: - continue with lantus 12units @ 1800  - continue regular insulin 11units @ 6AM, 9units @ 12PM, 7units @ 6PM  - check blood glucose 4x daily  - follow up with [facility]: [address] [place] [address]    Diagnosis: Thrombocytopenia  Assessment and Plan of Treatment: Possibly reactive in setting of antibiotics/active infection  - History of AOCD  - Patient with slightly elevated INR; S/p Vitk 10mg QD X 3 Days ( Ended 4/19)   - Transfuse for hg < 7.0 and platelets < 10k, < 20k if febrile and < 50k if bleeding ( Heme Reccs).    Diagnosis: Rheumatoid arthritis  Assessment and Plan of Treatment: Patient with Hx of RA on Enbrel as outpatient (Currently being held inpt)   - Outpatient Rheumatologist Dr. Egan   - Pt w/ sclera icterus c/w Active RA  - Seen by Rheumatology inpatient; recommended outpatient follow up   - Could not receive high dose steroids due to concern for impaired wound healing of PEG-J.  - Cont prednisone 5mg daily and oxycodone prn pain

## 2024-04-26 NOTE — PROGRESS NOTE ADULT - ASSESSMENT
74y Female with PMH of T2DM on insulin, HTN, HLD, hypothyroidism, RA on Prednisone, Fibromyalgia, cerebral aneurysm s/p repair, acute hypercapnic respiratory failure s/p trach (vent dependent) and PEG (10/22), bedbound presented from home with complaints of possible G tube dislodgement and redness around the site, now s/p replacement. Patient is now s/p tracheosotmy tube exchange from 8XLT (distal) cuffed to an 8XLT(distal) cuffed and silver nitrate cautery of granulation tissue (superior to stoma).

## 2024-04-26 NOTE — DISCHARGE NOTE PROVIDER - DID THE PATIENT PRESENT WITH OR WAS TREATED FOR MALNUTRITION DURING THIS ADMISSION
Infusion Nursing Note:  Flor Griffin presents today for port labs.     present during visit today: Not Applicable.    Note: will notify pt by phone re: lab results - chemo tomorrow.    Intravenous Access:  Implanted Port.    Treatment Conditions:  NA    Post Lab Assessment:  Patient tolerated blood collection     Site patent and intact, free from redness, edema or discomfort.  No evidence of extravasations.  Access discontinued)     Discharge Plan:   Patient and/or family verbalized understanding of  instructions and all questions answered.  Patient  to lobby in stable condition accompanied by: self.  Patient to see provider today: no  Departure Mode: Ambulatory.  Maria Luisa Landrum, RN, RN                           Yes...

## 2024-04-27 DIAGNOSIS — T85.528A DISPLACEMENT OF OTHER GASTROINTESTINAL PROSTHETIC DEVICES, IMPLANTS AND GRAFTS, INITIAL ENCOUNTER: ICD-10-CM

## 2024-04-27 LAB
GLUCOSE BLDC GLUCOMTR-MCNC: 121 MG/DL — HIGH (ref 70–99)
GLUCOSE BLDC GLUCOMTR-MCNC: 183 MG/DL — HIGH (ref 70–99)
GLUCOSE BLDC GLUCOMTR-MCNC: 302 MG/DL — HIGH (ref 70–99)
GLUCOSE BLDC GLUCOMTR-MCNC: 320 MG/DL — HIGH (ref 70–99)

## 2024-04-27 PROCEDURE — 99233 SBSQ HOSP IP/OBS HIGH 50: CPT | Mod: FS

## 2024-04-27 RX ORDER — INSULIN HUMAN 100 [IU]/ML
11 INJECTION, SOLUTION SUBCUTANEOUS
Refills: 0 | Status: DISCONTINUED | OUTPATIENT
Start: 2024-04-28 | End: 2024-05-01

## 2024-04-27 RX ADMIN — QUETIAPINE FUMARATE 12.5 MILLIGRAM(S): 200 TABLET, FILM COATED ORAL at 14:15

## 2024-04-27 RX ADMIN — PANTOPRAZOLE SODIUM 40 MILLIGRAM(S): 20 TABLET, DELAYED RELEASE ORAL at 11:06

## 2024-04-27 RX ADMIN — Medication 2000 UNIT(S): at 11:05

## 2024-04-27 RX ADMIN — Medication 125 MILLIGRAM(S): at 11:05

## 2024-04-27 RX ADMIN — Medication 5 MILLILITER(S): at 23:49

## 2024-04-27 RX ADMIN — LIDOCAINE 1 PATCH: 4 CREAM TOPICAL at 09:28

## 2024-04-27 RX ADMIN — Medication 1 DROP(S): at 14:15

## 2024-04-27 RX ADMIN — DICLOFENAC SODIUM 4 GRAM(S): 30 GEL TOPICAL at 17:30

## 2024-04-27 RX ADMIN — Medication 3 MILLILITER(S): at 00:11

## 2024-04-27 RX ADMIN — SIMETHICONE 80 MILLIGRAM(S): 80 TABLET, CHEWABLE ORAL at 00:06

## 2024-04-27 RX ADMIN — Medication 5 MILLILITER(S): at 17:29

## 2024-04-27 RX ADMIN — Medication 1 DROP(S): at 23:49

## 2024-04-27 RX ADMIN — DICLOFENAC SODIUM 4 GRAM(S): 30 GEL TOPICAL at 23:56

## 2024-04-27 RX ADMIN — Medication 15 MILLILITER(S): at 11:04

## 2024-04-27 RX ADMIN — LIDOCAINE 1 PATCH: 4 CREAM TOPICAL at 22:10

## 2024-04-27 RX ADMIN — CHLORHEXIDINE GLUCONATE 1 APPLICATION(S): 213 SOLUTION TOPICAL at 06:19

## 2024-04-27 RX ADMIN — Medication 1 DROP(S): at 00:06

## 2024-04-27 RX ADMIN — CHLORHEXIDINE GLUCONATE 15 MILLILITER(S): 213 SOLUTION TOPICAL at 17:29

## 2024-04-27 RX ADMIN — Medication 5 MILLILITER(S): at 11:03

## 2024-04-27 RX ADMIN — Medication 3 MILLILITER(S): at 17:12

## 2024-04-27 RX ADMIN — Medication 1 DROP(S): at 17:30

## 2024-04-27 RX ADMIN — INSULIN HUMAN 9 UNIT(S): 100 INJECTION, SOLUTION SUBCUTANEOUS at 06:18

## 2024-04-27 RX ADMIN — Medication 1 DROP(S): at 06:19

## 2024-04-27 RX ADMIN — Medication 1 DROP(S): at 10:38

## 2024-04-27 RX ADMIN — Medication 1 DROP(S): at 00:08

## 2024-04-27 RX ADMIN — Medication 1 DROP(S): at 22:12

## 2024-04-27 RX ADMIN — INSULIN HUMAN 9 UNIT(S): 100 INJECTION, SOLUTION SUBCUTANEOUS at 12:15

## 2024-04-27 RX ADMIN — LIDOCAINE 1 PATCH: 4 CREAM TOPICAL at 22:11

## 2024-04-27 RX ADMIN — Medication 1000 MILLIGRAM(S): at 10:03

## 2024-04-27 RX ADMIN — SIMETHICONE 80 MILLIGRAM(S): 80 TABLET, CHEWABLE ORAL at 11:04

## 2024-04-27 RX ADMIN — GABAPENTIN 100 MILLIGRAM(S): 400 CAPSULE ORAL at 22:10

## 2024-04-27 RX ADMIN — INSULIN HUMAN 8: 100 INJECTION, SOLUTION SUBCUTANEOUS at 12:14

## 2024-04-27 RX ADMIN — Medication 5 MILLILITER(S): at 00:06

## 2024-04-27 RX ADMIN — LIDOCAINE 1 PATCH: 4 CREAM TOPICAL at 07:39

## 2024-04-27 RX ADMIN — Medication 1 APPLICATION(S): at 22:12

## 2024-04-27 RX ADMIN — Medication 1 TABLET(S): at 11:04

## 2024-04-27 RX ADMIN — Medication 5 MILLIGRAM(S): at 09:45

## 2024-04-27 RX ADMIN — Medication 5 MILLILITER(S): at 06:18

## 2024-04-27 RX ADMIN — ESCITALOPRAM OXALATE 10 MILLIGRAM(S): 10 TABLET, FILM COATED ORAL at 17:29

## 2024-04-27 RX ADMIN — Medication 500 MILLIGRAM(S): at 11:04

## 2024-04-27 RX ADMIN — INSULIN HUMAN 7 UNIT(S): 100 INJECTION, SOLUTION SUBCUTANEOUS at 18:50

## 2024-04-27 RX ADMIN — LIDOCAINE 1 PATCH: 4 CREAM TOPICAL at 08:40

## 2024-04-27 RX ADMIN — Medication 1 APPLICATION(S): at 06:19

## 2024-04-27 RX ADMIN — Medication 1 DROP(S): at 11:07

## 2024-04-27 RX ADMIN — INSULIN HUMAN 0: 100 INJECTION, SOLUTION SUBCUTANEOUS at 06:18

## 2024-04-27 RX ADMIN — SIMETHICONE 80 MILLIGRAM(S): 80 TABLET, CHEWABLE ORAL at 23:49

## 2024-04-27 RX ADMIN — INSULIN GLARGINE 12 UNIT(S): 100 INJECTION, SOLUTION SUBCUTANEOUS at 18:51

## 2024-04-27 RX ADMIN — Medication 1 APPLICATION(S): at 14:15

## 2024-04-27 RX ADMIN — INSULIN HUMAN 8: 100 INJECTION, SOLUTION SUBCUTANEOUS at 18:50

## 2024-04-27 RX ADMIN — INSULIN HUMAN 2: 100 INJECTION, SOLUTION SUBCUTANEOUS at 00:07

## 2024-04-27 RX ADMIN — DICLOFENAC SODIUM 4 GRAM(S): 30 GEL TOPICAL at 06:20

## 2024-04-27 RX ADMIN — SIMETHICONE 80 MILLIGRAM(S): 80 TABLET, CHEWABLE ORAL at 06:18

## 2024-04-27 RX ADMIN — DICLOFENAC SODIUM 4 GRAM(S): 30 GEL TOPICAL at 00:07

## 2024-04-27 RX ADMIN — INSULIN HUMAN 2: 100 INJECTION, SOLUTION SUBCUTANEOUS at 23:50

## 2024-04-27 RX ADMIN — AMLODIPINE BESYLATE 10 MILLIGRAM(S): 2.5 TABLET ORAL at 08:47

## 2024-04-27 RX ADMIN — CHLORHEXIDINE GLUCONATE 15 MILLILITER(S): 213 SOLUTION TOPICAL at 06:18

## 2024-04-27 RX ADMIN — Medication 1000 MILLIGRAM(S): at 10:40

## 2024-04-27 RX ADMIN — Medication 1 DROP(S): at 11:06

## 2024-04-27 RX ADMIN — Medication 125 MICROGRAM(S): at 06:18

## 2024-04-27 RX ADMIN — Medication 3 MILLILITER(S): at 11:29

## 2024-04-27 RX ADMIN — DICLOFENAC SODIUM 4 GRAM(S): 30 GEL TOPICAL at 11:07

## 2024-04-27 RX ADMIN — OXYCODONE HYDROCHLORIDE 5 MILLIGRAM(S): 5 TABLET ORAL at 23:49

## 2024-04-27 RX ADMIN — GABAPENTIN 100 MILLIGRAM(S): 400 CAPSULE ORAL at 08:44

## 2024-04-27 RX ADMIN — SIMETHICONE 80 MILLIGRAM(S): 80 TABLET, CHEWABLE ORAL at 17:29

## 2024-04-27 RX ADMIN — Medication 3 MILLIGRAM(S): at 22:11

## 2024-04-27 RX ADMIN — Medication 3 MILLILITER(S): at 05:06

## 2024-04-27 RX ADMIN — Medication 1 DROP(S): at 08:48

## 2024-04-27 NOTE — PROGRESS NOTE ADULT - SUBJECTIVE AND OBJECTIVE BOX
Chief Complaint:  Patient is a 74y old  Female who presents with a chief complaint of Dislodged G Tube (27 Apr 2024 07:26)      Date of service 04-27-24 @ 14:24      Interval Events:   tolerating tube feeds    Hospital Medications:  acetaminophen   Oral Liquid .. 1000 milliGRAM(s) Enteral Tube every 6 hours PRN  albuterol/ipratropium for Nebulization 3 milliLiter(s) Nebulizer every 6 hours  amLODIPine   Tablet 10 milliGRAM(s) Oral <User Schedule>  artificial tears (preservative free) Ophthalmic Solution 1 Drop(s) Both EYES every 2 hours  ascorbic acid 500 milliGRAM(s) Oral daily  Biotene Dry Mouth Oral Rinse 5 milliLiter(s) Swish and Spit every 6 hours  calamine/zinc oxide Lotion 1 Application(s) Topical daily PRN  chlorhexidine 0.12% Liquid 15 milliLiter(s) Oral Mucosa every 12 hours  chlorhexidine 4% Liquid 1 Application(s) Topical <User Schedule>  cholecalciferol 2000 Unit(s) Oral daily  diclofenac sodium 1% Gel 4 Gram(s) Topical four times a day  erythromycin   Ointment 1 Application(s) Both EYES at bedtime  escitalopram 10 milliGRAM(s) Oral <User Schedule>  fentaNYL   Patch  25 MICROgram(s)/Hr 1 Patch Transdermal every 72 hours  gabapentin Solution 100 milliGRAM(s) Oral <User Schedule>  insulin glargine Injectable (LANTUS) 12 Unit(s) SubCutaneous <User Schedule>  insulin regular  human corrective regimen sliding scale   SubCutaneous every 6 hours  insulin regular  human recombinant 7 Unit(s) SubCutaneous <User Schedule>  insulin regular  human recombinant 9 Unit(s) SubCutaneous <User Schedule>  lactobacillus acidophilus 1 Tablet(s) Oral daily  levothyroxine 125 MICROGram(s) Oral daily  lidocaine   4% Patch 1 Patch Transdermal at bedtime  lidocaine   4% Patch 1 Patch Transdermal at bedtime  melatonin Liquid 3 milliGRAM(s) Oral at bedtime  multivitamin/minerals/iron Oral Solution (CENTRUM) 15 milliLiter(s) Oral daily  ondansetron Injectable 4 milliGRAM(s) IV Push every 8 hours PRN  oxyCODONE    Solution 5 milliGRAM(s) Enteral Tube every 4 hours PRN  oxyCODONE    Solution 10 milliGRAM(s) Enteral Tube every 6 hours PRN  pantoprazole  Injectable 40 milliGRAM(s) IV Push daily  prednisoLONE acetate 1% Suspension 1 Drop(s) Both EYES four times a day  predniSONE  Solution 5 milliGRAM(s) Oral <User Schedule>  QUEtiapine 12.5 milliGRAM(s) Oral <User Schedule>  simethicone 80 milliGRAM(s) Chew every 6 hours  sodium chloride 0.65% Nasal 1 Spray(s) Both Nostrils every 12 hours PRN  surgical lubricant sterile 1 Application(s) Topical every 8 hours  vancomycin    Solution 125 milliGRAM(s) Enteral Tube daily        Review of Systems:  unable to obtain     PHYSICAL EXAM:   Vital Signs:  Vital Signs Last 24 Hrs  T(C): 37 (27 Apr 2024 11:00), Max: 37 (27 Apr 2024 11:00)  T(F): 98.6 (27 Apr 2024 11:00), Max: 98.6 (27 Apr 2024 11:00)  HR: 77 (27 Apr 2024 11:56) (70 - 94)  BP: 127/53 (27 Apr 2024 11:00) (110/41 - 137/68)  BP(mean): --  RR: 19 (27 Apr 2024 11:00) (17 - 20)  SpO2: 100% (27 Apr 2024 11:56) (98% - 100%)    Parameters below as of 27 Apr 2024 11:56  Patient On (Oxygen Delivery Method): ventilator      Daily     Daily       PHYSICAL EXAM:     GENERAL:  Appears stated age, well-groomed, well-nourished, no distress  HEENT:  NC/AT,  conjunctivae anicteric, clear and pink,   NECK: supple, trachea midline  CHEST:  Full & symmetric excursion, no increased effort, breath sounds clear  HEART:  Regular rhythm, no JVD  ABDOMEN:  Soft, non-tender, non-distended, normoactive bowel sounds,  no masses , no hepatosplenomegaly, PEGJ site c/d/i  EXTREMITIES:  no cyanosis,clubbing or edema  SKIN:  No rash, erythema, or, ecchymoses, no jaundice  NEURO:  Alert, non-focal, no asterixis  PSYCH: Appropriate affect, oriented to place and time  RECTAL: Deferred      LABS Personally reviewed by me:                        Imaging personally reviewed by me:

## 2024-04-27 NOTE — CHART NOTE - NSCHARTNOTEFT_GEN_A_CORE
Age: 74y    Gender: Female    POCT Blood Glucose:  302 mg/dL (04-27-24 @ 12:10)  121 mg/dL (04-27-24 @ 05:23)  188 mg/dL (04-26-24 @ 23:31)  186 mg/dL (04-26-24 @ 17:49)      eMAR:  insulin glargine Injectable (LANTUS)   12 Unit(s) SubCutaneous (04-26-24 @ 17:52)    insulin regular  human corrective regimen sliding scale   8 Unit(s) SubCutaneous (04-27-24 @ 12:14)   0 Unit(s) SubCutaneous (04-27-24 @ 06:18)   2 Unit(s) SubCutaneous (04-27-24 @ 00:07)   2 Unit(s) SubCutaneous (04-26-24 @ 17:52)    insulin regular  human recombinant   7 Unit(s) SubCutaneous (04-26-24 @ 17:52)    insulin regular  human recombinant   9 Unit(s) SubCutaneous (04-27-24 @ 12:15)    insulin regular  human recombinant   9 Unit(s) SubCutaneous (04-27-24 @ 06:18)    levothyroxine   125 MICROGram(s) Oral (04-27-24 @ 06:18)    predniSONE  Solution   5 milliGRAM(s) Oral (04-27-24 @ 09:45)    Uncontrolled Type 2 Diabetes Mellitus   #Hyperglycemia on Tube feeds  - Follows with: Dr Rupali Ruth, endocrinology.   - A1C with Estimated Average Glucose Result: 6.2 % (04-04-24)  - Home regimen: Patient is on TF from 6AM-6PM. Takes Lantus 15 units qhs and regular insulin 10 units at 6AM, 12 units at 12PM, and 6 units at 6PM. She is on prednisone 5mg daily for RA  - eGFR: 113 mL/min/1.73m2 (04-19-24)  - Weight (kg): 48.1 (04-16-24)  - current tube feeds: [employer] [place] 1.5 at 55 cc/hr 6AM-midnight. unclear what final home TF will be on discharge per primary team  - 4/27 Last 24 hour BGs mostly at goal except BG at noon   -  INPATIENT PLAN:  - continue Lantus 12 units at 6PM  - Adjust  Admelog to Regular insulin (longer acting)  11 units at 0600, c/w Regular insulin to 9 units at 1200, continue 7 units at 1800,  NO DOSE AT MN !(HOLD if tube feeds are stopped).  - C/w mod dose admelog correction scale to moderate dose Regular insulin scale q6h

## 2024-04-27 NOTE — PROGRESS NOTE ADULT - NS ATTEND AMEND GEN_ALL_CORE FT
74F PMH RA, cerebellar aneurysm s/p repair p/w dislodged PEG tube. Now with G-J tube, hospital course c/b CDAD,   - Waxing and waning mental status, less alert today, may be tired.  - Trache, on full vent 12/350/30%/+5, attempting PST, albeit 15/5  - Previously occasional leaking of contents from PEG tube site, now appears to have resolved. Minimal drainage from prior PEG site. JG tube in good position with no issues. Tolerating tube feeds.  - Monitoring off antibiotics (s/p cefepime for Serratia bacteremia) other than PO vancomycin taper for recurrent episode recurrent C.diff. Plan for dose of bezlotoxumab O/P after D/C.  - s/p FEEST, strict recommendations for pleasure feeding.  - Dispo planning continues, awaiting supplies and necessary equipment needed for discharge home.

## 2024-04-27 NOTE — PROGRESS NOTE ADULT - SUBJECTIVE AND OBJECTIVE BOX
Patient is a 74y old  Female who presents with a chief complaint of Dislodged G Tube (26 Apr 2024 17:07)      Interval Events:    REVIEW OF SYSTEMS:  [ ] Positive  [ ] All other systems negative  [ ] Unable to assess ROS because ________    Vital Signs Last 24 Hrs  T(C): 36.6 (04-27-24 @ 05:30), Max: 37.3 (04-26-24 @ 11:30)  T(F): 97.9 (04-27-24 @ 05:30), Max: 99.2 (04-26-24 @ 11:30)  HR: 90 (04-27-24 @ 05:30) (70 - 94)  BP: 137/68 (04-27-24 @ 05:30) (110/41 - 137/68)  RR: 17 (04-27-24 @ 05:30) (17 - 20)  SpO2: 100% (04-27-24 @ 05:30) (98% - 100%)    PHYSICAL EXAM:  HEENT:   [ ]Tracheostomy:  [ ]Pupils equal  [ ]No oral lesions  [ ]Abnormal    SKIN  [ ]No Rash  [ ] Abnormal  [ ] pressure    CARDIAC  [ ]Regular  [ ]Abnormal    PULMONARY  [ ]Bilateral Clear Breath Sounds  [ ]Normal Excursion  [ ]Abnormal    GI  [ ]PEG      [ ] +BS		              [ ]Soft, nondistended, nontender	  [ ]Abnormal    MUSCULOSKELETAL                                   [ ]Bedbound                 [ ]Abnormal    [ ]Ambulatory/OOB to chair                           EXTREMITIES                                         [ ]Normal  [ ]Edema                           NEUROLOGIC  [ ] Normal, non focal  [ ] Focal findings:    PSYCHIATRIC  [ ]Alert and appropriate  [ ] Sedated	 [ ]Agitated    :  Mcbride: [ ] Yes, if yes: Date of Placement:                   [  ] No    LINES: Central Lines [ ] Yes, if yes: Date of Placement                                     [  ] No    HOSPITAL MEDICATIONS:  MEDICATIONS  (STANDING):  albuterol/ipratropium for Nebulization 3 milliLiter(s) Nebulizer every 6 hours  amLODIPine   Tablet 10 milliGRAM(s) Oral <User Schedule>  artificial tears (preservative free) Ophthalmic Solution 1 Drop(s) Both EYES every 2 hours  ascorbic acid 500 milliGRAM(s) Oral daily  Biotene Dry Mouth Oral Rinse 5 milliLiter(s) Swish and Spit every 6 hours  chlorhexidine 0.12% Liquid 15 milliLiter(s) Oral Mucosa every 12 hours  chlorhexidine 4% Liquid 1 Application(s) Topical <User Schedule>  cholecalciferol 2000 Unit(s) Oral daily  diclofenac sodium 1% Gel 4 Gram(s) Topical four times a day  erythromycin   Ointment 1 Application(s) Both EYES at bedtime  escitalopram 10 milliGRAM(s) Oral <User Schedule>  fentaNYL   Patch  25 MICROgram(s)/Hr 1 Patch Transdermal every 72 hours  gabapentin Solution 100 milliGRAM(s) Oral <User Schedule>  insulin glargine Injectable (LANTUS) 12 Unit(s) SubCutaneous <User Schedule>  insulin regular  human corrective regimen sliding scale   SubCutaneous every 6 hours  insulin regular  human recombinant 9 Unit(s) SubCutaneous <User Schedule>  insulin regular  human recombinant 9 Unit(s) SubCutaneous <User Schedule>  insulin regular  human recombinant 7 Unit(s) SubCutaneous <User Schedule>  lactobacillus acidophilus 1 Tablet(s) Oral daily  levothyroxine 125 MICROGram(s) Oral daily  lidocaine   4% Patch 1 Patch Transdermal at bedtime  lidocaine   4% Patch 1 Patch Transdermal at bedtime  melatonin Liquid 3 milliGRAM(s) Oral at bedtime  multivitamin/minerals/iron Oral Solution (CENTRUM) 15 milliLiter(s) Oral daily  pantoprazole  Injectable 40 milliGRAM(s) IV Push daily  prednisoLONE acetate 1% Suspension 1 Drop(s) Both EYES four times a day  predniSONE  Solution 5 milliGRAM(s) Oral <User Schedule>  QUEtiapine 12.5 milliGRAM(s) Oral <User Schedule>  simethicone 80 milliGRAM(s) Chew every 6 hours  surgical lubricant sterile 1 Application(s) Topical every 8 hours  vancomycin    Solution 125 milliGRAM(s) Enteral Tube daily    MEDICATIONS  (PRN):  acetaminophen   Oral Liquid .. 1000 milliGRAM(s) Enteral Tube every 6 hours PRN Temp greater or equal to 38C (100.4F), Mild Pain (1 - 3)  calamine/zinc oxide Lotion 1 Application(s) Topical daily PRN Rash and/or Itching  ondansetron Injectable 4 milliGRAM(s) IV Push every 8 hours PRN Nausea and/or Vomiting  oxyCODONE    Solution 5 milliGRAM(s) Enteral Tube every 4 hours PRN Moderate Pain (4 - 6)  oxyCODONE    Solution 10 milliGRAM(s) Enteral Tube every 6 hours PRN Severe Pain (7 - 10)  sodium chloride 0.65% Nasal 1 Spray(s) Both Nostrils every 12 hours PRN Congestion      LABS:                  CAPILLARY BLOOD GLUCOSE    MICROBIOLOGY:     RADIOLOGY:  [ ] Reviewed and interpreted by me    Mode: AC/ CMV (Assist Control/ Continuous Mandatory Ventilation)  RR (machine): 12  TV (machine): 350  FiO2: 30  PEEP: 5  ITime: 1  MAP: 7  PIP: 24   Patient is a 74y old  Female who presents with a chief complaint of Dislodged G Tube (26 Apr 2024 17:07)      Interval Events: No events over night.    REVIEW OF SYSTEMS:  [ ] Positive  [X] All other systems negative: No complaints during exam this morning  [ ] Unable to assess ROS because ____    Vital Signs Last 24 Hrs  T(C): 36.6 (04-27-24 @ 05:30), Max: 37.3 (04-26-24 @ 11:30)  T(F): 97.9 (04-27-24 @ 05:30), Max: 99.2 (04-26-24 @ 11:30)  HR: 90 (04-27-24 @ 05:30) (70 - 94)  BP: 137/68 (04-27-24 @ 05:30) (110/41 - 137/68)  RR: 17 (04-27-24 @ 05:30) (17 - 20)  SpO2: 100% (04-27-24 @ 05:30) (98% - 100%)    PHYSICAL EXAM:  HEENT:   [X] Tracheostomy:  #8 distal XLT Cuffed Shiley  [X] PERRL B/L; EOMI; eyes red L>R (improving)  [X] No oral lesions  [ ] Abnormal    SKIN  [ ] No Rash  [ ] Abnormal  [X] pressure: B/L buttocks/sacral deep tissue injury     CARDIAC  [X] Regular  [ ]Abnormal    PULMONARY  [X] Bilateral Clear Breath Sounds  [ ] Normal Excursion  [ ] Abnormal    GI  [X] PEG-J      [X] +BS		              [X] Soft, nondistended, nontender	  [X] Abnormal:  Original PEG stoma appears to be healing and smaller, site otherwise clean without erythema or purulence    MUSCULOSKELETAL                                   [X] Bedbound                 [ ] Abnormal    [ ] Ambulatory/OOB to chair                           EXTREMITIES                                         [X] Normal  [ ] Edema                       NEUROLOGIC  [ ] Normal, non focal  [X] Focal findings:  Alert and Oriented x3; responds to questions by mouthing words; +gag reflex/cough; no facial asymmetry observed; moves all distal limbs upon request; B/L plantar flexion    PSYCHIATRIC  [X] Lethargic but arousable, affect appropriate  [ ] Sedated	 [ ]Agitated    :  Mcbride: [ ] Yes, if yes: Date of Placement:                   [X ] No    LINES: Central Lines [ ] Yes, if yes: Date of Placement                                     [X] No    HOSPITAL MEDICATIONS:  MEDICATIONS  (STANDING):  albuterol/ipratropium for Nebulization 3 milliLiter(s) Nebulizer every 6 hours  amLODIPine   Tablet 10 milliGRAM(s) Oral <User Schedule>  artificial tears (preservative free) Ophthalmic Solution 1 Drop(s) Both EYES every 2 hours  ascorbic acid 500 milliGRAM(s) Oral daily  Biotene Dry Mouth Oral Rinse 5 milliLiter(s) Swish and Spit every 6 hours  chlorhexidine 0.12% Liquid 15 milliLiter(s) Oral Mucosa every 12 hours  chlorhexidine 4% Liquid 1 Application(s) Topical <User Schedule>  cholecalciferol 2000 Unit(s) Oral daily  diclofenac sodium 1% Gel 4 Gram(s) Topical four times a day  erythromycin   Ointment 1 Application(s) Both EYES at bedtime  escitalopram 10 milliGRAM(s) Oral <User Schedule>  fentaNYL   Patch  25 MICROgram(s)/Hr 1 Patch Transdermal every 72 hours  gabapentin Solution 100 milliGRAM(s) Oral <User Schedule>  insulin glargine Injectable (LANTUS) 12 Unit(s) SubCutaneous <User Schedule>  insulin regular  human corrective regimen sliding scale   SubCutaneous every 6 hours  insulin regular  human recombinant 9 Unit(s) SubCutaneous <User Schedule>  insulin regular  human recombinant 9 Unit(s) SubCutaneous <User Schedule>  insulin regular  human recombinant 7 Unit(s) SubCutaneous <User Schedule>  lactobacillus acidophilus 1 Tablet(s) Oral daily  levothyroxine 125 MICROGram(s) Oral daily  lidocaine   4% Patch 1 Patch Transdermal at bedtime  lidocaine   4% Patch 1 Patch Transdermal at bedtime  melatonin Liquid 3 milliGRAM(s) Oral at bedtime  multivitamin/minerals/iron Oral Solution (CENTRUM) 15 milliLiter(s) Oral daily  pantoprazole  Injectable 40 milliGRAM(s) IV Push daily  prednisoLONE acetate 1% Suspension 1 Drop(s) Both EYES four times a day  predniSONE  Solution 5 milliGRAM(s) Oral <User Schedule>  QUEtiapine 12.5 milliGRAM(s) Oral <User Schedule>  simethicone 80 milliGRAM(s) Chew every 6 hours  surgical lubricant sterile 1 Application(s) Topical every 8 hours  vancomycin    Solution 125 milliGRAM(s) Enteral Tube daily    MEDICATIONS  (PRN):  acetaminophen   Oral Liquid .. 1000 milliGRAM(s) Enteral Tube every 6 hours PRN Temp greater or equal to 38C (100.4F), Mild Pain (1 - 3)  calamine/zinc oxide Lotion 1 Application(s) Topical daily PRN Rash and/or Itching  ondansetron Injectable 4 milliGRAM(s) IV Push every 8 hours PRN Nausea and/or Vomiting  oxyCODONE    Solution 5 milliGRAM(s) Enteral Tube every 4 hours PRN Moderate Pain (4 - 6)  oxyCODONE    Solution 10 milliGRAM(s) Enteral Tube every 6 hours PRN Severe Pain (7 - 10)  sodium chloride 0.65% Nasal 1 Spray(s) Both Nostrils every 12 hours PRN Congestion      LABS:                  CAPILLARY BLOOD GLUCOSE    MICROBIOLOGY:     RADIOLOGY:  [ ] Reviewed and interpreted by me    Mode: AC/ CMV (Assist Control/ Continuous Mandatory Ventilation)  RR (machine): 12  TV (machine): 350  FiO2: 30  PEEP: 5  ITime: 1  MAP: 7  PIP: 24

## 2024-04-27 NOTE — PROGRESS NOTE ADULT - ASSESSMENT
#Gastrostomy malfunction/buried bumper  s/p PEG-J placement 4/16   tolerating feeds  ongoing surgery care appreciated   loose BMs yesterday - consider adding banatrol to tube feeds     #Recurrent C.diff   per ID   bacid added      d/c planning in progress

## 2024-04-27 NOTE — PROGRESS NOTE ADULT - PROBLEM SELECTOR PLAN 10
- Dc planning to home Thursday 4/25   - CM working with Home care agencies / [employer] to ensure services and supplies upon Dc - Dc planning to home pending delivery of home supplies  - CM working with Home care agencies / [employer] to ensure services and supplies upon Dc

## 2024-04-27 NOTE — PROGRESS NOTE ADULT - ASSESSMENT
73 yo F PMH T2DM on insulin, HTN, HLD, hypothyroidism, RA on Prednisone, Fibromyalgia, cerebral aneurysm s/p repair, chronic hypercapnic respiratory failure s/p trach (vent dependent) and Chronic PEG 2022, recurrent C. diff infection (follows with Dr. Newman) , bedbound who presented from home with G tube dislodgement and redness around the site. Had CT Abdomen/Pelvis done showing dislodgement of G tube with balloon in the anterior abdominal wall with air filled track to stomach. Admitted to MICU for ventilator management and given vancomycin/meropenem empirically for treatment of abd wall cellulitis. Per family patient has had Known c diff infection for past 2-3 weeks and was being treated with Fidaxomicin, which completed inpatient.  IR team exchanged PEG on 4/3 however later in the day it remained with leakage.  IR Attending adjusted PEG at bedside on 4/4 and PEG remained with moderate amount of PEG leakage once feeds initiated at reduced rate.  GI team re-consulted as second opinion for PEG management.  On 4/9 PEG was found out of place, a emerson catheter was placed in the stoma to maintain patency.  Patient was planned for PEG revision with both Surgery and GI team involved on 4/11 however patient had fevers up to 102F and procedure was cancelled for infectious work-up.  An 18 Fr PEG tube placed at bedside on 4/12 (original PEG size was 20Fr) as stoma site appears to have closed. Antibiotics were switched from Meropenem to Cefepime, completed 4/14. Patient now s/p PEG-J and closure of gastric fistula with GI and surgery in endo suite on 4/16.  New stoma site with no leakage, pt has been tolerating feeds.  A FEES was performed on 4/23 with recs for comfort feeds with parameters as per SLP.  Pt to continue vanco taper until 05/23 for c diff treatment with outpatient follow up with Dr. Newman.  Continuing to pressure support as tolerated.  Pt to return home.      4/24: No events reported overnight, Home Vent trial performed today without issues. Patient remains medically stable for discharge to home. Daughter with concerns over frequency of BMs ( 4 in 24 hrs); will add Banana Flakes QD.   4/25: No events over night.  There was moderate amount of clear viscous secretions that accumulated over night from old PEG stoma site.  Otherwise, remains medically stable for discharge to home on ventilator with home care once services available.   4/26:  Patient with multiple loose bowel movements this afternoon.  As arrangements for discharge is tentative for 4/29, a fecal containment device will be temporarily placed for large stool burden and for prevention of sacral wound deterioration.  Florastor probiotic unable to be crushed per pharmacy, will add bacid tablets BID and activia as per ID reccomendations.  75 yo F PMH T2DM on insulin, HTN, HLD, hypothyroidism, RA on Prednisone, Fibromyalgia, cerebral aneurysm s/p repair, chronic hypercapnic respiratory failure s/p trach (vent dependent) and Chronic PEG 2022, recurrent C. diff infection (follows with Dr. Newman) , bedbound who presented from home with G tube dislodgement and redness around the site. Had CT Abdomen/Pelvis done showing dislodgement of G tube with balloon in the anterior abdominal wall with air filled track to stomach. Admitted to MICU for ventilator management and given vancomycin/meropenem empirically for treatment of abd wall cellulitis. Per family patient has had Known c diff infection for past 2-3 weeks and was being treated with Fidaxomicin, which completed inpatient.  IR team exchanged PEG on 4/3 however later in the day it remained with leakage.  IR Attending adjusted PEG at bedside on 4/4 and PEG remained with moderate amount of PEG leakage once feeds initiated at reduced rate.  GI team re-consulted as second opinion for PEG management.  On 4/9 PEG was found out of place, a emerson catheter was placed in the stoma to maintain patency.  Patient was planned for PEG revision with both Surgery and GI team involved on 4/11 however patient had fevers up to 102F and procedure was cancelled for infectious work-up.  An 18 Fr PEG tube placed at bedside on 4/12 (original PEG size was 20Fr) as stoma site appears to have closed. Antibiotics were switched from Meropenem to Cefepime, completed 4/14. Patient now s/p PEG-J and closure of gastric fistula with GI and surgery in endo suite on 4/16.  New stoma site with no leakage, pt has been tolerating feeds.  A FEES was performed on 4/23 with recs for comfort feeds with parameters as per SLP.  Pt to continue vanco taper until 05/23 for c diff treatment with outpatient follow up with Dr. Newman.  Continuing to pressure support as tolerated.  Pt to return home.      4/24: No events reported overnight, Home Vent trial performed today without issues. Patient remains medically stable for discharge to home. Daughter with concerns over frequency of BMs ( 4 in 24 hrs); will add Banana Flakes QD.   4/25: No events over night.  There was moderate amount of clear viscous secretions that accumulated over night from old PEG stoma site.  Otherwise, remains medically stable for discharge to home on ventilator with home care once services available.   4/26:  Patient with multiple loose bowel movements this afternoon.  As arrangements for discharge is tentative for 4/29, a fecal containment device will be temporarily placed for large stool burden and for prevention of sacral wound deterioration.  Florastor probiotic unable to be crushed per pharmacy, will add bacid tablets BID and activia as per ID recommendations   4/27: No acute events.  Attempted PS 15/5 this morning however poorly tolerated as patient became tachypneic..

## 2024-04-28 LAB
GLUCOSE BLDC GLUCOMTR-MCNC: 184 MG/DL — HIGH (ref 70–99)
GLUCOSE BLDC GLUCOMTR-MCNC: 190 MG/DL — HIGH (ref 70–99)
GLUCOSE BLDC GLUCOMTR-MCNC: 243 MG/DL — HIGH (ref 70–99)
GLUCOSE BLDC GLUCOMTR-MCNC: 97 MG/DL — SIGNIFICANT CHANGE UP (ref 70–99)

## 2024-04-28 PROCEDURE — 99233 SBSQ HOSP IP/OBS HIGH 50: CPT | Mod: FS

## 2024-04-28 RX ORDER — LANOLIN ALCOHOL/MO/W.PET/CERES
3 CREAM (GRAM) TOPICAL AT BEDTIME
Refills: 0 | Status: DISCONTINUED | OUTPATIENT
Start: 2024-04-28 | End: 2024-05-01

## 2024-04-28 RX ORDER — PANTOPRAZOLE SODIUM 20 MG/1
40 TABLET, DELAYED RELEASE ORAL DAILY
Refills: 0 | Status: DISCONTINUED | OUTPATIENT
Start: 2024-04-28 | End: 2024-04-30

## 2024-04-28 RX ADMIN — INSULIN GLARGINE 12 UNIT(S): 100 INJECTION, SOLUTION SUBCUTANEOUS at 17:57

## 2024-04-28 RX ADMIN — LIDOCAINE 1 PATCH: 4 CREAM TOPICAL at 07:12

## 2024-04-28 RX ADMIN — Medication 1 TABLET(S): at 11:40

## 2024-04-28 RX ADMIN — Medication 1 APPLICATION(S): at 05:44

## 2024-04-28 RX ADMIN — SIMETHICONE 80 MILLIGRAM(S): 80 TABLET, CHEWABLE ORAL at 23:43

## 2024-04-28 RX ADMIN — DICLOFENAC SODIUM 4 GRAM(S): 30 GEL TOPICAL at 05:45

## 2024-04-28 RX ADMIN — Medication 15 MILLILITER(S): at 11:43

## 2024-04-28 RX ADMIN — Medication 3 MILLILITER(S): at 11:39

## 2024-04-28 RX ADMIN — CHLORHEXIDINE GLUCONATE 15 MILLILITER(S): 213 SOLUTION TOPICAL at 05:43

## 2024-04-28 RX ADMIN — Medication 1 DROP(S): at 17:59

## 2024-04-28 RX ADMIN — GABAPENTIN 100 MILLIGRAM(S): 400 CAPSULE ORAL at 21:39

## 2024-04-28 RX ADMIN — Medication 5 MILLILITER(S): at 23:42

## 2024-04-28 RX ADMIN — Medication 1 DROP(S): at 14:05

## 2024-04-28 RX ADMIN — LIDOCAINE 1 PATCH: 4 CREAM TOPICAL at 21:39

## 2024-04-28 RX ADMIN — SIMETHICONE 80 MILLIGRAM(S): 80 TABLET, CHEWABLE ORAL at 17:58

## 2024-04-28 RX ADMIN — Medication 1 APPLICATION(S): at 21:40

## 2024-04-28 RX ADMIN — DICLOFENAC SODIUM 4 GRAM(S): 30 GEL TOPICAL at 23:42

## 2024-04-28 RX ADMIN — Medication 125 MILLIGRAM(S): at 12:17

## 2024-04-28 RX ADMIN — INSULIN HUMAN 11 UNIT(S): 100 INJECTION, SOLUTION SUBCUTANEOUS at 06:12

## 2024-04-28 RX ADMIN — Medication 3 MILLILITER(S): at 05:27

## 2024-04-28 RX ADMIN — Medication 1000 MILLIGRAM(S): at 09:21

## 2024-04-28 RX ADMIN — PANTOPRAZOLE SODIUM 40 MILLIGRAM(S): 20 TABLET, DELAYED RELEASE ORAL at 11:42

## 2024-04-28 RX ADMIN — OXYCODONE HYDROCHLORIDE 5 MILLIGRAM(S): 5 TABLET ORAL at 00:30

## 2024-04-28 RX ADMIN — Medication 5 MILLILITER(S): at 17:58

## 2024-04-28 RX ADMIN — LIDOCAINE 1 PATCH: 4 CREAM TOPICAL at 10:27

## 2024-04-28 RX ADMIN — Medication 5 MILLILITER(S): at 11:39

## 2024-04-28 RX ADMIN — Medication 3 MILLILITER(S): at 17:02

## 2024-04-28 RX ADMIN — Medication 1 DROP(S): at 05:44

## 2024-04-28 RX ADMIN — INSULIN HUMAN 9 UNIT(S): 100 INJECTION, SOLUTION SUBCUTANEOUS at 12:18

## 2024-04-28 RX ADMIN — Medication 1 DROP(S): at 11:42

## 2024-04-28 RX ADMIN — Medication 1 DROP(S): at 09:48

## 2024-04-28 RX ADMIN — Medication 1 DROP(S): at 23:43

## 2024-04-28 RX ADMIN — Medication 3 MILLIGRAM(S): at 21:40

## 2024-04-28 RX ADMIN — CHLORHEXIDINE GLUCONATE 15 MILLILITER(S): 213 SOLUTION TOPICAL at 17:58

## 2024-04-28 RX ADMIN — QUETIAPINE FUMARATE 12.5 MILLIGRAM(S): 200 TABLET, FILM COATED ORAL at 14:05

## 2024-04-28 RX ADMIN — Medication 2000 UNIT(S): at 11:40

## 2024-04-28 RX ADMIN — INSULIN HUMAN 2: 100 INJECTION, SOLUTION SUBCUTANEOUS at 23:42

## 2024-04-28 RX ADMIN — Medication 3 MILLILITER(S): at 23:08

## 2024-04-28 RX ADMIN — DICLOFENAC SODIUM 4 GRAM(S): 30 GEL TOPICAL at 17:59

## 2024-04-28 RX ADMIN — Medication 5 MILLILITER(S): at 05:43

## 2024-04-28 RX ADMIN — Medication 500 MILLIGRAM(S): at 11:41

## 2024-04-28 RX ADMIN — INSULIN HUMAN 0: 100 INJECTION, SOLUTION SUBCUTANEOUS at 05:45

## 2024-04-28 RX ADMIN — Medication 1 DROP(S): at 23:42

## 2024-04-28 RX ADMIN — INSULIN HUMAN 2: 100 INJECTION, SOLUTION SUBCUTANEOUS at 12:17

## 2024-04-28 RX ADMIN — SIMETHICONE 80 MILLIGRAM(S): 80 TABLET, CHEWABLE ORAL at 05:43

## 2024-04-28 RX ADMIN — CHLORHEXIDINE GLUCONATE 1 APPLICATION(S): 213 SOLUTION TOPICAL at 05:44

## 2024-04-28 RX ADMIN — Medication 1000 MILLIGRAM(S): at 10:29

## 2024-04-28 RX ADMIN — ESCITALOPRAM OXALATE 10 MILLIGRAM(S): 10 TABLET, FILM COATED ORAL at 17:58

## 2024-04-28 RX ADMIN — Medication 1 DROP(S): at 16:08

## 2024-04-28 RX ADMIN — DICLOFENAC SODIUM 4 GRAM(S): 30 GEL TOPICAL at 11:42

## 2024-04-28 RX ADMIN — SIMETHICONE 80 MILLIGRAM(S): 80 TABLET, CHEWABLE ORAL at 11:40

## 2024-04-28 RX ADMIN — Medication 1 DROP(S): at 21:41

## 2024-04-28 RX ADMIN — Medication 5 MILLIGRAM(S): at 09:49

## 2024-04-28 RX ADMIN — INSULIN HUMAN 7 UNIT(S): 100 INJECTION, SOLUTION SUBCUTANEOUS at 18:00

## 2024-04-28 RX ADMIN — Medication 125 MICROGRAM(S): at 05:43

## 2024-04-28 RX ADMIN — LIDOCAINE 1 PATCH: 4 CREAM TOPICAL at 10:26

## 2024-04-28 RX ADMIN — GABAPENTIN 100 MILLIGRAM(S): 400 CAPSULE ORAL at 09:19

## 2024-04-28 RX ADMIN — INSULIN HUMAN 4: 100 INJECTION, SOLUTION SUBCUTANEOUS at 17:59

## 2024-04-28 RX ADMIN — Medication 1 APPLICATION(S): at 14:05

## 2024-04-28 RX ADMIN — Medication 1 DROP(S): at 07:56

## 2024-04-28 NOTE — PROGRESS NOTE ADULT - NS ATTEND AMEND GEN_ALL_CORE FT
74F PMH RA, cerebellar aneurysm s/p repair p/w dislodged PEG tube. Now with G-J tube.     - Waxing and waning mental status, awake, alert today   - Trache, on full vent 12/350/30%/+5, attempting PST, albeit 15/5  - Previously occasional leaking of contents from PEG tube site, now appears to have resolved. Minimal drainage from prior PEG site. JG tube in good position with no issues. Tolerating tube feeds.  - Monitoring off antibiotics (s/p cefepime for Serratia bacteremia) other than PO vancomycin taper for recurrent episode recurrent C.diff. Plan for dose of bezlotoxumab O/P after D/C.  - s/p FEEST, strict recommendations for pleasure feeding.  - Dispo planning continues, awaiting supplies and necessary equipment needed for discharge home.

## 2024-04-28 NOTE — PROGRESS NOTE ADULT - PROBLEM SELECTOR PLAN 1
- Patient p/w PEG tube dislodgement on admission   - CT A/P 4/2: Dislodged G tube with balloon in the anterior abdominal wall with air filled track to stomach  - S/p bedside exchange by IR on 4/3 ( # 20 Fr Kangaroo EnFit placed )  - 4/4: PEG leaking; IR adjusted PEG at bedside  - 4/5: PEG still leaking, adjusted at bedside by IR Attending.  - 4/8-4/9 IR aborted procedure; GI Dr. Cleaning consulted for new PEG  - 4/9 PEG fell out spontaneously, Mcbried placed into tract   - 4/12: Mcbride replaced with 18Fr PEG at bedside, bumper at 8cm (intentionally loose)  - CT A/P 4/12: G-tube in the stomach/Mild inflammation thickening along tract   - 4/14 18 fr PEG was used, feeds were re-initiated, attempt failed, PEG with copious amounts of leakage  - 4/16 S/p new PEG-J and closure of gastric fistula in endo suite with GI and surgery  - Patient Tolerating feeds at Goal Rate

## 2024-04-28 NOTE — PROGRESS NOTE ADULT - PROBLEM SELECTOR PLAN 10
- Dc planning to home pending delivery of home supplies  - CM working with Home care agencies / [employer] to ensure services and supplies upon Dc

## 2024-04-28 NOTE — PROGRESS NOTE ADULT - ASSESSMENT
75 yo F PMH T2DM on insulin, HTN, HLD, hypothyroidism, RA on Prednisone, Fibromyalgia, cerebral aneurysm s/p repair, chronic hypercapnic respiratory failure s/p trach (vent dependent) and Chronic PEG 2022, recurrent C. diff infection (follows with Dr. Newman) , bedbound who presented from home with G tube dislodgement and redness around the site. Had CT Abdomen/Pelvis done showing dislodgement of G tube with balloon in the anterior abdominal wall with air filled track to stomach. Admitted to MICU for ventilator management and given vancomycin/meropenem empirically for treatment of abd wall cellulitis. Per family patient has had Known c diff infection for past 2-3 weeks and was being treated with Fidaxomicin, which completed inpatient.  IR team exchanged PEG on 4/3 however later in the day it remained with leakage.  IR Attending adjusted PEG at bedside on 4/4 and PEG remained with moderate amount of PEG leakage once feeds initiated at reduced rate.  GI team re-consulted as second opinion for PEG management.  On 4/9 PEG was found out of place, a emerson catheter was placed in the stoma to maintain patency.  Patient was planned for PEG revision with both Surgery and GI team involved on 4/11 however patient had fevers up to 102F and procedure was cancelled for infectious work-up.  An 18 Fr PEG tube placed at bedside on 4/12 (original PEG size was 20Fr) as stoma site appears to have closed. Antibiotics were switched from Meropenem to Cefepime, completed 4/14. Patient now s/p PEG-J and closure of gastric fistula with GI and surgery in endo suite on 4/16.  New stoma site with no leakage, pt has been tolerating feeds.  A FEES was performed on 4/23 with recs for comfort feeds with parameters as per SLP.  Pt to continue vanco taper until 05/23 for c diff treatment with outpatient follow up with Dr. Newman.  Continuing to pressure support as tolerated.  Pt to return home.      4/24: No events reported overnight, Home Vent trial performed today without issues. Patient remains medically stable for discharge to home. Daughter with concerns over frequency of BMs ( 4 in 24 hrs); will add Banana Flakes QD.   4/25: No events over night.  There was moderate amount of clear viscous secretions that accumulated over night from old PEG stoma site.  Otherwise, remains medically stable for discharge to home on ventilator with home care once services available.   4/26:  Patient with multiple loose bowel movements this afternoon.  As arrangements for discharge is tentative for 4/29, a fecal containment device will be temporarily placed for large stool burden and for prevention of sacral wound deterioration.  Florastor probiotic unable to be crushed per pharmacy, will add bacid tablets BID and activia as per ID recommendations   4/27: No acute events.  Attempted PS 15/5 this morning however poorly tolerated as patient became tachypneic..

## 2024-04-28 NOTE — PROGRESS NOTE ADULT - SUBJECTIVE AND OBJECTIVE BOX
Patient is a 74y old  Female who presents with a chief complaint of Dislodged G Tube (26 Apr 2024 17:07)      Interval Events: No events over night.    REVIEW OF SYSTEMS:  [ ] Positive  [X] All other systems negative: No complaints during exam this morning  [ ] Unable to assess ROS because ____    Vital Signs Last 24 Hrs  T(C): 36.6 (04-27-24 @ 05:30), Max: 37.3 (04-26-24 @ 11:30)  T(F): 97.9 (04-27-24 @ 05:30), Max: 99.2 (04-26-24 @ 11:30)  HR: 90 (04-27-24 @ 05:30) (70 - 94)  BP: 137/68 (04-27-24 @ 05:30) (110/41 - 137/68)  RR: 17 (04-27-24 @ 05:30) (17 - 20)  SpO2: 100% (04-27-24 @ 05:30) (98% - 100%)    PHYSICAL EXAM:  HEENT:   [X] Tracheostomy:  #8 distal XLT Cuffed Shiley  [X] PERRL B/L; EOMI; eyes red L>R (improving)  [X] No oral lesions  [ ] Abnormal    SKIN  [ ] No Rash  [ ] Abnormal  [X] pressure: B/L buttocks/sacral deep tissue injury     CARDIAC  [X] Regular  [ ]Abnormal    PULMONARY  [X] Bilateral Clear Breath Sounds  [ ] Normal Excursion  [ ] Abnormal    GI  [X] PEG-J      [X] +BS		              [X] Soft, nondistended, nontender	  [X] Abnormal:  Original PEG stoma appears to be healing and smaller, site otherwise clean without erythema or purulence    MUSCULOSKELETAL                                   [X] Bedbound                 [ ] Abnormal    [ ] Ambulatory/OOB to chair                           EXTREMITIES                                         [X] Normal  [ ] Edema                       NEUROLOGIC  [ ] Normal, non focal  [X] Focal findings:  Alert and Oriented x3; responds to questions by mouthing words; +gag reflex/cough; no facial asymmetry observed; moves all distal limbs upon request; B/L plantar flexion    PSYCHIATRIC  [X] Lethargic but arousable, affect appropriate  [ ] Sedated	 [ ]Agitated    :  Mcbride: [ ] Yes, if yes: Date of Placement:                   [X ] No    LINES: Central Lines [ ] Yes, if yes: Date of Placement                                     [X] No    HOSPITAL MEDICATIONS:  MEDICATIONS  (STANDING):  albuterol/ipratropium for Nebulization 3 milliLiter(s) Nebulizer every 6 hours  amLODIPine   Tablet 10 milliGRAM(s) Oral <User Schedule>  artificial tears (preservative free) Ophthalmic Solution 1 Drop(s) Both EYES every 2 hours  ascorbic acid 500 milliGRAM(s) Oral daily  Biotene Dry Mouth Oral Rinse 5 milliLiter(s) Swish and Spit every 6 hours  chlorhexidine 0.12% Liquid 15 milliLiter(s) Oral Mucosa every 12 hours  chlorhexidine 4% Liquid 1 Application(s) Topical <User Schedule>  cholecalciferol 2000 Unit(s) Oral daily  diclofenac sodium 1% Gel 4 Gram(s) Topical four times a day  erythromycin   Ointment 1 Application(s) Both EYES at bedtime  escitalopram 10 milliGRAM(s) Oral <User Schedule>  fentaNYL   Patch  25 MICROgram(s)/Hr 1 Patch Transdermal every 72 hours  gabapentin Solution 100 milliGRAM(s) Oral <User Schedule>  insulin glargine Injectable (LANTUS) 12 Unit(s) SubCutaneous <User Schedule>  insulin regular  human corrective regimen sliding scale   SubCutaneous every 6 hours  insulin regular  human recombinant 9 Unit(s) SubCutaneous <User Schedule>  insulin regular  human recombinant 9 Unit(s) SubCutaneous <User Schedule>  insulin regular  human recombinant 7 Unit(s) SubCutaneous <User Schedule>  lactobacillus acidophilus 1 Tablet(s) Oral daily  levothyroxine 125 MICROGram(s) Oral daily  lidocaine   4% Patch 1 Patch Transdermal at bedtime  lidocaine   4% Patch 1 Patch Transdermal at bedtime  melatonin Liquid 3 milliGRAM(s) Oral at bedtime  multivitamin/minerals/iron Oral Solution (CENTRUM) 15 milliLiter(s) Oral daily  pantoprazole  Injectable 40 milliGRAM(s) IV Push daily  prednisoLONE acetate 1% Suspension 1 Drop(s) Both EYES four times a day  predniSONE  Solution 5 milliGRAM(s) Oral <User Schedule>  QUEtiapine 12.5 milliGRAM(s) Oral <User Schedule>  simethicone 80 milliGRAM(s) Chew every 6 hours  surgical lubricant sterile 1 Application(s) Topical every 8 hours  vancomycin    Solution 125 milliGRAM(s) Enteral Tube daily    MEDICATIONS  (PRN):  acetaminophen   Oral Liquid .. 1000 milliGRAM(s) Enteral Tube every 6 hours PRN Temp greater or equal to 38C (100.4F), Mild Pain (1 - 3)  calamine/zinc oxide Lotion 1 Application(s) Topical daily PRN Rash and/or Itching  ondansetron Injectable 4 milliGRAM(s) IV Push every 8 hours PRN Nausea and/or Vomiting  oxyCODONE    Solution 5 milliGRAM(s) Enteral Tube every 4 hours PRN Moderate Pain (4 - 6)  oxyCODONE    Solution 10 milliGRAM(s) Enteral Tube every 6 hours PRN Severe Pain (7 - 10)  sodium chloride 0.65% Nasal 1 Spray(s) Both Nostrils every 12 hours PRN Congestion      LABS:                  CAPILLARY BLOOD GLUCOSE    MICROBIOLOGY:     RADIOLOGY:  [ ] Reviewed and interpreted by me    Mode: AC/ CMV (Assist Control/ Continuous Mandatory Ventilation)  RR (machine): 12  TV (machine): 350  FiO2: 30  PEEP: 5  ITime: 1  MAP: 7  PIP: 24   Patient is a 74y old  Female who presents with a chief complaint of Dislodged G Tube (26 Apr 2024 17:07)      Interval Events: No events over night.    REVIEW OF SYSTEMS:  [ ] Positive  [X] All other systems negative:  sl abdominal discomfort   rectal tube in place    [ ] Unable to assess ROS because ____    Vital Signs Last 24 Hrs  T(C): 36.6 (04-27-24 @ 05:30), Max: 37.3 (04-26-24 @ 11:30)  T(F): 97.9 (04-27-24 @ 05:30), Max: 99.2 (04-26-24 @ 11:30)  HR: 90 (04-27-24 @ 05:30) (70 - 94)  BP: 137/68 (04-27-24 @ 05:30) (110/41 - 137/68)  RR: 17 (04-27-24 @ 05:30) (17 - 20)  SpO2: 100% (04-27-24 @ 05:30) (98% - 100%)    PHYSICAL EXAM:  HEENT:   [X] Tracheostomy:  #8 distal XLT Cuffed Shiley  [X] PERRL B/L; EOMI; eyes red L>R (improving)  [X] No oral lesions  [ ] Abnormal    SKIN  [ ] No Rash  [ ] Abnormal  [X] pressure: B/L buttocks/sacral deep tissue injury     CARDIAC  [X] Regular  [ ]Abnormal    PULMONARY  [X] Bilateral Clear Breath Sounds  [ ] Normal Excursion  [ ] Abnormal    GI  [X] PEG-J      [X] +BS		              [X] Soft, nondistended, nontender	  [X] Abnormal:  Original PEG stoma appears to be healing and smaller, site otherwise clean without erythema or purulence    MUSCULOSKELETAL                                   [X] Bedbound                 [ ] Abnormal    [ ] Ambulatory/OOB to chair                           EXTREMITIES                                         [X] Normal  [ ] Edema                       NEUROLOGIC  [ ] Normal, non focal  [X] Focal findings:  Alert and Oriented x3; responds to questions by mouthing words; +gag reflex/cough; no facial asymmetry observed; moves all distal limbs upon request; B/L plantar flexion    PSYCHIATRIC  [X] Lethargic but arousable, affect appropriate  [ ] Sedated	 [ ]Agitated    :  Mcbride: [ ] Yes, if yes: Date of Placement:                   [X ] No    LINES: Central Lines [ ] Yes, if yes: Date of Placement                                     [X] No    HOSPITAL MEDICATIONS:  MEDICATIONS  (STANDING):  albuterol/ipratropium for Nebulization 3 milliLiter(s) Nebulizer every 6 hours  amLODIPine   Tablet 10 milliGRAM(s) Oral <User Schedule>  artificial tears (preservative free) Ophthalmic Solution 1 Drop(s) Both EYES every 2 hours  ascorbic acid 500 milliGRAM(s) Oral daily  Biotene Dry Mouth Oral Rinse 5 milliLiter(s) Swish and Spit every 6 hours  chlorhexidine 0.12% Liquid 15 milliLiter(s) Oral Mucosa every 12 hours  chlorhexidine 4% Liquid 1 Application(s) Topical <User Schedule>  cholecalciferol 2000 Unit(s) Oral daily  diclofenac sodium 1% Gel 4 Gram(s) Topical four times a day  erythromycin   Ointment 1 Application(s) Both EYES at bedtime  escitalopram 10 milliGRAM(s) Oral <User Schedule>  fentaNYL   Patch  25 MICROgram(s)/Hr 1 Patch Transdermal every 72 hours  gabapentin Solution 100 milliGRAM(s) Oral <User Schedule>  insulin glargine Injectable (LANTUS) 12 Unit(s) SubCutaneous <User Schedule>  insulin regular  human corrective regimen sliding scale   SubCutaneous every 6 hours  insulin regular  human recombinant 9 Unit(s) SubCutaneous <User Schedule>  insulin regular  human recombinant 9 Unit(s) SubCutaneous <User Schedule>  insulin regular  human recombinant 7 Unit(s) SubCutaneous <User Schedule>  lactobacillus acidophilus 1 Tablet(s) Oral daily  levothyroxine 125 MICROGram(s) Oral daily  lidocaine   4% Patch 1 Patch Transdermal at bedtime  lidocaine   4% Patch 1 Patch Transdermal at bedtime  melatonin Liquid 3 milliGRAM(s) Oral at bedtime  multivitamin/minerals/iron Oral Solution (CENTRUM) 15 milliLiter(s) Oral daily  pantoprazole  Injectable 40 milliGRAM(s) IV Push daily  prednisoLONE acetate 1% Suspension 1 Drop(s) Both EYES four times a day  predniSONE  Solution 5 milliGRAM(s) Oral <User Schedule>  QUEtiapine 12.5 milliGRAM(s) Oral <User Schedule>  simethicone 80 milliGRAM(s) Chew every 6 hours  surgical lubricant sterile 1 Application(s) Topical every 8 hours  vancomycin    Solution 125 milliGRAM(s) Enteral Tube daily    MEDICATIONS  (PRN):  acetaminophen   Oral Liquid .. 1000 milliGRAM(s) Enteral Tube every 6 hours PRN Temp greater or equal to 38C (100.4F), Mild Pain (1 - 3)  calamine/zinc oxide Lotion 1 Application(s) Topical daily PRN Rash and/or Itching  ondansetron Injectable 4 milliGRAM(s) IV Push every 8 hours PRN Nausea and/or Vomiting  oxyCODONE    Solution 5 milliGRAM(s) Enteral Tube every 4 hours PRN Moderate Pain (4 - 6)  oxyCODONE    Solution 10 milliGRAM(s) Enteral Tube every 6 hours PRN Severe Pain (7 - 10)  sodium chloride 0.65% Nasal 1 Spray(s) Both Nostrils every 12 hours PRN Congestion      LABS:                  CAPILLARY BLOOD GLUCOSE    MICROBIOLOGY:     RADIOLOGY:  [ ] Reviewed and interpreted by me    Mode: AC/ CMV (Assist Control/ Continuous Mandatory Ventilation)  RR (machine): 12  TV (machine): 350  FiO2: 30  PEEP: 5  ITime: 1  MAP: 7  PIP: 24

## 2024-04-29 LAB
ALBUMIN SERPL ELPH-MCNC: 3.1 G/DL — LOW (ref 3.3–5)
ALP SERPL-CCNC: 104 U/L — SIGNIFICANT CHANGE UP (ref 40–120)
ALT FLD-CCNC: 77 U/L — HIGH (ref 10–45)
ANION GAP SERPL CALC-SCNC: 12 MMOL/L — SIGNIFICANT CHANGE UP (ref 5–17)
AST SERPL-CCNC: 92 U/L — HIGH (ref 10–40)
BILIRUB SERPL-MCNC: 0.5 MG/DL — SIGNIFICANT CHANGE UP (ref 0.2–1.2)
BUN SERPL-MCNC: 19 MG/DL — SIGNIFICANT CHANGE UP (ref 7–23)
CALCIUM SERPL-MCNC: 9.3 MG/DL — SIGNIFICANT CHANGE UP (ref 8.4–10.5)
CHLORIDE SERPL-SCNC: 104 MMOL/L — SIGNIFICANT CHANGE UP (ref 96–108)
CO2 SERPL-SCNC: 20 MMOL/L — LOW (ref 22–31)
CREAT SERPL-MCNC: <0.3 MG/DL — LOW (ref 0.5–1.3)
EGFR: 113 ML/MIN/1.73M2 — SIGNIFICANT CHANGE UP
GLUCOSE BLDC GLUCOMTR-MCNC: 181 MG/DL — HIGH (ref 70–99)
GLUCOSE BLDC GLUCOMTR-MCNC: 221 MG/DL — HIGH (ref 70–99)
GLUCOSE BLDC GLUCOMTR-MCNC: 235 MG/DL — HIGH (ref 70–99)
GLUCOSE BLDC GLUCOMTR-MCNC: 93 MG/DL — SIGNIFICANT CHANGE UP (ref 70–99)
GLUCOSE SERPL-MCNC: 189 MG/DL — HIGH (ref 70–99)
HCT VFR BLD CALC: 32.5 % — LOW (ref 34.5–45)
HGB BLD-MCNC: 9.3 G/DL — LOW (ref 11.5–15.5)
MAGNESIUM SERPL-MCNC: 1.9 MG/DL — SIGNIFICANT CHANGE UP (ref 1.6–2.6)
MCHC RBC-ENTMCNC: 26.9 PG — LOW (ref 27–34)
MCHC RBC-ENTMCNC: 28.6 GM/DL — LOW (ref 32–36)
MCV RBC AUTO: 93.9 FL — SIGNIFICANT CHANGE UP (ref 80–100)
NRBC # BLD: 0 /100 WBCS — SIGNIFICANT CHANGE UP (ref 0–0)
PHOSPHATE SERPL-MCNC: 3.6 MG/DL — SIGNIFICANT CHANGE UP (ref 2.5–4.5)
PLATELET # BLD AUTO: 179 K/UL — SIGNIFICANT CHANGE UP (ref 150–400)
POTASSIUM SERPL-MCNC: 3.6 MMOL/L — SIGNIFICANT CHANGE UP (ref 3.5–5.3)
POTASSIUM SERPL-SCNC: 3.6 MMOL/L — SIGNIFICANT CHANGE UP (ref 3.5–5.3)
PROT SERPL-MCNC: 7.1 G/DL — SIGNIFICANT CHANGE UP (ref 6–8.3)
RBC # BLD: 3.46 M/UL — LOW (ref 3.8–5.2)
RBC # FLD: 21.5 % — HIGH (ref 10.3–14.5)
SODIUM SERPL-SCNC: 136 MMOL/L — SIGNIFICANT CHANGE UP (ref 135–145)
T4 FREE SERPL-MCNC: 0.9 NG/DL — SIGNIFICANT CHANGE UP (ref 0.9–1.8)
TSH SERPL-MCNC: 0.84 UIU/ML — SIGNIFICANT CHANGE UP (ref 0.27–4.2)
WBC # BLD: 10.92 K/UL — HIGH (ref 3.8–10.5)
WBC # FLD AUTO: 10.92 K/UL — HIGH (ref 3.8–10.5)

## 2024-04-29 PROCEDURE — 99233 SBSQ HOSP IP/OBS HIGH 50: CPT | Mod: FS,GC

## 2024-04-29 RX ORDER — NYSTATIN CREAM 100000 [USP'U]/G
1 CREAM TOPICAL
Refills: 0 | Status: DISCONTINUED | OUTPATIENT
Start: 2024-04-29 | End: 2024-05-01

## 2024-04-29 RX ORDER — INSULIN HUMAN 100 [IU]/ML
INJECTION, SOLUTION SUBCUTANEOUS
Refills: 0 | Status: DISCONTINUED | OUTPATIENT
Start: 2024-04-29 | End: 2024-05-01

## 2024-04-29 RX ORDER — OXYCODONE HYDROCHLORIDE 5 MG/1
5 TABLET ORAL EVERY 4 HOURS
Refills: 0 | Status: DISCONTINUED | OUTPATIENT
Start: 2024-04-29 | End: 2024-05-01

## 2024-04-29 RX ORDER — MAGNESIUM SULFATE 500 MG/ML
2 VIAL (ML) INJECTION ONCE
Refills: 0 | Status: COMPLETED | OUTPATIENT
Start: 2024-04-29 | End: 2024-04-29

## 2024-04-29 RX ADMIN — Medication 125 MILLIGRAM(S): at 12:42

## 2024-04-29 RX ADMIN — INSULIN HUMAN 7 UNIT(S): 100 INJECTION, SOLUTION SUBCUTANEOUS at 18:28

## 2024-04-29 RX ADMIN — LIDOCAINE 1 PATCH: 4 CREAM TOPICAL at 07:08

## 2024-04-29 RX ADMIN — Medication 15 MILLILITER(S): at 15:44

## 2024-04-29 RX ADMIN — INSULIN GLARGINE 12 UNIT(S): 100 INJECTION, SOLUTION SUBCUTANEOUS at 18:49

## 2024-04-29 RX ADMIN — INSULIN HUMAN 4: 100 INJECTION, SOLUTION SUBCUTANEOUS at 18:28

## 2024-04-29 RX ADMIN — CHLORHEXIDINE GLUCONATE 15 MILLILITER(S): 213 SOLUTION TOPICAL at 05:59

## 2024-04-29 RX ADMIN — Medication 15 MILLILITER(S): at 21:56

## 2024-04-29 RX ADMIN — Medication 5 MILLILITER(S): at 12:40

## 2024-04-29 RX ADMIN — Medication 3 MILLIGRAM(S): at 21:54

## 2024-04-29 RX ADMIN — Medication 1 DROP(S): at 18:29

## 2024-04-29 RX ADMIN — INSULIN HUMAN 0: 100 INJECTION, SOLUTION SUBCUTANEOUS at 05:59

## 2024-04-29 RX ADMIN — Medication 25 GRAM(S): at 12:47

## 2024-04-29 RX ADMIN — ESCITALOPRAM OXALATE 10 MILLIGRAM(S): 10 TABLET, FILM COATED ORAL at 18:36

## 2024-04-29 RX ADMIN — DICLOFENAC SODIUM 4 GRAM(S): 30 GEL TOPICAL at 12:41

## 2024-04-29 RX ADMIN — Medication 3 MILLILITER(S): at 11:26

## 2024-04-29 RX ADMIN — Medication 15 MILLILITER(S): at 12:42

## 2024-04-29 RX ADMIN — Medication 500 MILLIGRAM(S): at 12:42

## 2024-04-29 RX ADMIN — Medication 5 MILLILITER(S): at 05:59

## 2024-04-29 RX ADMIN — INSULIN HUMAN 11 UNIT(S): 100 INJECTION, SOLUTION SUBCUTANEOUS at 05:59

## 2024-04-29 RX ADMIN — Medication 3 MILLILITER(S): at 05:16

## 2024-04-29 RX ADMIN — Medication 3 MILLILITER(S): at 23:12

## 2024-04-29 RX ADMIN — NYSTATIN CREAM 1 APPLICATION(S): 100000 CREAM TOPICAL at 18:29

## 2024-04-29 RX ADMIN — CHLORHEXIDINE GLUCONATE 15 MILLILITER(S): 213 SOLUTION TOPICAL at 18:27

## 2024-04-29 RX ADMIN — Medication 125 MICROGRAM(S): at 05:59

## 2024-04-29 RX ADMIN — AMLODIPINE BESYLATE 10 MILLIGRAM(S): 2.5 TABLET ORAL at 09:13

## 2024-04-29 RX ADMIN — Medication 1 DROP(S): at 21:54

## 2024-04-29 RX ADMIN — Medication 1 DROP(S): at 20:26

## 2024-04-29 RX ADMIN — PANTOPRAZOLE SODIUM 40 MILLIGRAM(S): 20 TABLET, DELAYED RELEASE ORAL at 12:41

## 2024-04-29 RX ADMIN — CHLORHEXIDINE GLUCONATE 1 APPLICATION(S): 213 SOLUTION TOPICAL at 06:02

## 2024-04-29 RX ADMIN — Medication 1 TABLET(S): at 12:43

## 2024-04-29 RX ADMIN — LIDOCAINE 1 PATCH: 4 CREAM TOPICAL at 21:55

## 2024-04-29 RX ADMIN — Medication 1 DROP(S): at 10:02

## 2024-04-29 RX ADMIN — Medication 1 APPLICATION(S): at 21:56

## 2024-04-29 RX ADMIN — LIDOCAINE 1 PATCH: 4 CREAM TOPICAL at 09:22

## 2024-04-29 RX ADMIN — Medication 1 DROP(S): at 12:43

## 2024-04-29 RX ADMIN — DICLOFENAC SODIUM 4 GRAM(S): 30 GEL TOPICAL at 18:31

## 2024-04-29 RX ADMIN — INSULIN HUMAN 9 UNIT(S): 100 INJECTION, SOLUTION SUBCUTANEOUS at 12:42

## 2024-04-29 RX ADMIN — Medication 1 APPLICATION(S): at 06:02

## 2024-04-29 RX ADMIN — Medication 1 DROP(S): at 06:00

## 2024-04-29 RX ADMIN — Medication 1 APPLICATION(S): at 15:44

## 2024-04-29 RX ADMIN — Medication 5 MILLIGRAM(S): at 09:13

## 2024-04-29 RX ADMIN — Medication 1 DROP(S): at 12:41

## 2024-04-29 RX ADMIN — Medication 3 MILLILITER(S): at 18:10

## 2024-04-29 RX ADMIN — Medication 1 DROP(S): at 15:54

## 2024-04-29 RX ADMIN — QUETIAPINE FUMARATE 12.5 MILLIGRAM(S): 200 TABLET, FILM COATED ORAL at 15:58

## 2024-04-29 RX ADMIN — Medication 1 APPLICATION(S): at 21:54

## 2024-04-29 RX ADMIN — DICLOFENAC SODIUM 4 GRAM(S): 30 GEL TOPICAL at 06:02

## 2024-04-29 RX ADMIN — GABAPENTIN 100 MILLIGRAM(S): 400 CAPSULE ORAL at 21:53

## 2024-04-29 RX ADMIN — GABAPENTIN 100 MILLIGRAM(S): 400 CAPSULE ORAL at 09:13

## 2024-04-29 RX ADMIN — SIMETHICONE 80 MILLIGRAM(S): 80 TABLET, CHEWABLE ORAL at 18:27

## 2024-04-29 RX ADMIN — Medication 1 DROP(S): at 05:59

## 2024-04-29 RX ADMIN — Medication 2000 UNIT(S): at 12:42

## 2024-04-29 RX ADMIN — Medication 1 DROP(S): at 09:12

## 2024-04-29 RX ADMIN — SIMETHICONE 80 MILLIGRAM(S): 80 TABLET, CHEWABLE ORAL at 05:59

## 2024-04-29 RX ADMIN — Medication 5 MILLILITER(S): at 18:27

## 2024-04-29 RX ADMIN — SIMETHICONE 80 MILLIGRAM(S): 80 TABLET, CHEWABLE ORAL at 12:40

## 2024-04-29 RX ADMIN — INSULIN HUMAN 4: 100 INJECTION, SOLUTION SUBCUTANEOUS at 12:40

## 2024-04-29 NOTE — PROGRESS NOTE ADULT - ASSESSMENT
73 yo F PMH T2DM on insulin, HTN, HLD, hypothyroidism, RA on Prednisone, Fibromyalgia, cerebral aneurysm s/p repair, chronic hypercapnic respiratory failure s/p trach (vent dependent) and Chronic PEG 2022, recurrent C. diff infection (follows with Dr. Newman) , bedbound who presented from home with G tube dislodgement and redness around the site. Had CT Abdomen/Pelvis done showing dislodgement of G tube with balloon in the anterior abdominal wall with air filled track to stomach. Admitted to MICU for ventilator management and given vancomycin/meropenem empirically for treatment of abd wall cellulitis. Per family patient has had Known c diff infection for past 2-3 weeks and was being treated with Fidaxomicin, which completed inpatient.  IR team exchanged PEG on 4/3 however later in the day it remained with leakage.  IR Attending adjusted PEG at bedside on 4/4 and PEG remained with moderate amount of PEG leakage once feeds initiated at reduced rate.  GI team re-consulted as second opinion for PEG management.  On 4/9 PEG was found out of place, a emerson catheter was placed in the stoma to maintain patency.  Patient was planned for PEG revision with both Surgery and GI team involved on 4/11 however patient had fevers up to 102F and procedure was cancelled for infectious work-up.  An 18 Fr PEG tube placed at bedside on 4/12 (original PEG size was 20Fr) as stoma site appears to have closed. Antibiotics were switched from Meropenem to Cefepime, completed 4/14. Patient now s/p PEG-J and closure of gastric fistula with GI and surgery in endo suite on 4/16.  New stoma site with no leakage, pt has been tolerating feeds.  A FEES was performed on 4/23 with recs for comfort feeds with parameters as per SLP.  Pt to continue vanco taper until 05/23 for c diff treatment with outpatient follow up with Dr. Newman.  Continuing to pressure support as tolerated.  Pt to return home.      4/24: No events reported overnight, Home Vent trial performed today without issues. Patient remains medically stable for discharge to home. Daughter with concerns over frequency of BMs ( 4 in 24 hrs); will add Banana Flakes QD.   4/25: No events over night.  There was moderate amount of clear viscous secretions that accumulated over night from old PEG stoma site.  Otherwise, remains medically stable for discharge to home on ventilator with home care once services available.   4/26:  Patient with multiple loose bowel movements this afternoon.  As arrangements for discharge is tentative for 4/29, a fecal containment device will be temporarily placed for large stool burden and for prevention of sacral wound deterioration.  Florastor probiotic unable to be crushed per pharmacy, will add bacid tablets BID and activia as per ID recommendations   4/27: No acute events.  Attempted PS 15/5 this morning however poorly tolerated as patient became tachypneic.. 73 yo F PMH T2DM on insulin, HTN, HLD, hypothyroidism, RA on Prednisone, Fibromyalgia, cerebral aneurysm s/p repair, chronic hypercapnic respiratory failure s/p trach (vent dependent) and Chronic PEG 2022, recurrent C. diff infection (follows with Dr. Newman) , bedbound who presented from home with G tube dislodgement and redness around the site. Had CT Abdomen/Pelvis done showing dislodgement of G tube with balloon in the anterior abdominal wall with air filled track to stomach. Admitted to MICU for ventilator management and given vancomycin/meropenem empirically for treatment of abd wall cellulitis. Per family patient has had Known c diff infection for past 2-3 weeks and was being treated with Fidaxomicin, which completed inpatient.  IR team exchanged PEG on 4/3 however later in the day it remained with leakage.  IR Attending adjusted PEG at bedside on 4/4 and PEG remained with moderate amount of PEG leakage once feeds initiated at reduced rate.  GI team re-consulted as second opinion for PEG management.  On 4/9 PEG was found out of place, a emerson catheter was placed in the stoma to maintain patency.  Patient was planned for PEG revision with both Surgery and GI team involved on 4/11 however patient had fevers up to 102F and procedure was cancelled for infectious work-up.  An 18 Fr PEG tube placed at bedside on 4/12 (original PEG size was 20Fr) as stoma site appears to have closed. Antibiotics were switched from Meropenem to Cefepime, completed 4/14. Patient now s/p PEG-J and closure of gastric fistula with GI and surgery in endo suite on 4/16.  New stoma site with no leakage, pt has been tolerating feeds.  A FEES was performed on 4/23 with recs for comfort feeds with parameters as per SLP.  Pt to continue vanco taper until 05/23 for c diff treatment with outpatient follow up with Dr. Newman.  Continuing to pressure support as tolerated.  Pt to return home.      4/28:  No acute events throughout the day.  Remains medically stable for discharge. 73 yo F PMH T2DM on insulin, HTN, HLD, hypothyroidism, RA on Prednisone, Fibromyalgia, cerebral aneurysm s/p repair, chronic hypercapnic respiratory failure s/p trach (vent dependent) and Chronic PEG 2022, recurrent C. diff infection (follows with Dr. Newman) , bedbound who presented from home with G tube dislodgement and redness around the site. Had CT Abdomen/Pelvis done showing dislodgement of G tube with balloon in the anterior abdominal wall with air filled track to stomach. Admitted to MICU for ventilator management and given vancomycin/meropenem empirically for treatment of abd wall cellulitis. Per family patient has had Known c diff infection for past 2-3 weeks and was being treated with Fidaxomicin, which completed inpatient.  IR team exchanged PEG on 4/3 however later in the day it remained with leakage.  IR Attending adjusted PEG at bedside on 4/4 and PEG remained with moderate amount of PEG leakage once feeds initiated at reduced rate.  GI team re-consulted as second opinion for PEG management.  On 4/9 PEG was found out of place, a emerson catheter was placed in the stoma to maintain patency.  Patient was planned for PEG revision with both Surgery and GI team involved on 4/11 however patient had fevers up to 102F and procedure was cancelled for infectious work-up.  An 18 Fr PEG tube placed at bedside on 4/12 (original PEG size was 20Fr) as stoma site appears to have closed. Antibiotics were switched from Meropenem to Cefepime, completed 4/14. Patient now s/p PEG-J and closure of gastric fistula with GI and surgery in endo suite on 4/16.  New stoma site with no leakage, pt has been tolerating feeds.  A FEES was performed on 4/23 with recs for comfort feeds with parameters as per SLP.  Pt to continue vanco taper until 05/23 for c diff treatment with outpatient follow up with Dr. Newman.  Continuing to pressure support as tolerated.  Pt to return home.      4/29:  No acute events throughout the day.  Remains medically stable for discharge.

## 2024-04-29 NOTE — PROGRESS NOTE ADULT - NS ATTEND AMEND GEN_ALL_CORE FT
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     N: Awake and alert at the time of my exam. Interactive and following. Continue delirium precautions, routine RCU care.   CV: Has been normotensive since admission.   P: Chronic respiratory failure s/p tracheostomy. She continues on full vent support with high support PSV trials as she tolerates. Will continue oral care and suctioning as needed per RCU protocol. She has duonebs ordered for every 6 hours.   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 She is s/p EGD assisted sutured closer of gastrocutaneous fistula and GJ placement (4/16) with surgery. She is tolerating tube feeds without issue. Her daughter is requesting surgical evaluation of granulation tissue prior to discharge by surgery. Will ask them to come back.   She has had issues with ongoing diarrhea in the setting of C.Diff and tube feeding. She has a rectal tube in place. We have added banatrol, and will add Bismuth as per ID recs to help form stool.  s/p FEEST, strict recommendations for pleasure feeding. The family has requested additional guidance from dietary in terms of supplements and feeding consistency recommendations. We will ensure they have communicated with family prior to discharge.  R: Creatinine within normal range, making good urine. She has no emerson in place, using external collection device. Will continue to monitor clinically   ID: We continue to monitor her off antibiotics (s/p full course for Serratia bacteremia) other than PO vancomycin taper for recurrent episode C.diff. Plan for dose of bezlotoxumab as an outpatient after discharge. Dr. Newman is following and has been communicating closely with family.  Endo: She has a history of hypothyroidism, and diabetes. We appreciate endocrinology input in this challenging case. We have send thyroid labs, which are pending at the time of this note.  Heme: The patient has a history of chronic anemia. [place] saw the patient during the admission and provided the following recommendation: Transfuse for hg < 7.0 and platelets < 10k, < 20k if febrile and < 50k if bleeding. However, primary team can consider transfusion goal hg < 8.0 if patient has symptoms of anemia, or during sepsis.      Dispo planning underway. Await additional supplies and services to be reinstated. Updated daughter Marysol over the phone and  at bedside.

## 2024-04-29 NOTE — CHART NOTE - NSCHARTNOTEFT_GEN_A_CORE
Nutrition Follow Up Note  Patient seen for: follow up    Source: [] Patient       [x] Medical Record        [] RN        [x] Family over phone (trisha Woods [phone])      [x] Other: case management, PA    -If unable to interview patient: [x] Trach/Vent/BiPAP  [] Disoriented/confused/inappropriate to interview    Diet Order:   Diet, NPO with Tube Feed:   Tube Feeding Modality: Jejunostomy  TRADE TO REBATE Peptide 1.5 (KFPEPT1.5RTH)  Total Volume for 24 Hours (mL): 990  Continuous  Starting Tube Feed Rate {mL per Hour}: 50  Increase Tube Feed Rate by (mL): 5     Every 24 hours  Until Goal Tube Feed Rate (mL per Hour): 55  Tube Feed Duration (in Hours): 18  Tube Feed Start Time: 06:00  Tube Feed Stop Time: 00:00  Free Water Flush  Pump   Rate (mL per Hour): 20   Frequency: Every 2 Hours  Alfred(7 Gm Arginine/7 Gm Glut/1.2 Gm HMB     Qty per Day:  2  No Carb Prosource TF     Qty per Day:  1  Banatrol TF     Qty per Day:  1  Supplement Feeding Modality:  Gastrostomy  Probiotic Yogurt/Smoothie Cans or Servings Per Day:  1       Frequency:  Two Times a day (24)    EN order with prosource providing at 100% provision: 1615kcal (33.5kcal/kg) and 88g protein (1.8g/kg)    - Is current order appropriate/adequate? see below for recommendations    Nutrition-related concerns:  -Pt vegan, however daughter agreeable to Alfred and Probiotic Yogurt per conversation today. TRADE TO REBATE Peptide 1.5 is a vegan EN formula.   - Swallow FEES Assessment Adult Recommendations: "Primary recommendation is NPO however with keeping aligned to goc/poc and for quality of life purposes would allow comfort feeds with certain parameters. Puree and thin liquids (tsp or straw) only allowed 1-2x/day with 100% assist/supervision with educated staff/family, 2-4 ounces total only, and 10 minute maximum with feeding task. Ensure pt as close to 90 degrees as able. Ensure pt on VENT PARAMETERS that were present during this study; VOLUME , 12/30%/5."  -Hgb A1c 6.2%. Insulin regimen ordered per team to aid in maintaining glycemic control. TRADE TO REBATE Peptide 1.5 has a low glycemic index per TRADE TO REBATE.   -Synthroid via PEG ordered   -s/p G-J tube placement. Per GI: feeds thru j-tube, vent g-tube as needed.      GI: c.diff per chart with rectal tube. see flow sheets for output.  Bowel Regimen: Banatrol    Weights:   Dosing weight 48.1kg  Daily Weight in k.8 ()  Some changes may be due to fluid shifts. RD will continue to monitor trends.     Nutritionally Pertinent MEDICATIONS  (STANDING):  amLODIPine   Tablet  ascorbic acid  bismuth subsalicylate Liquid  cholecalciferol  insulin glargine Injectable (LANTUS)  insulin regular  human corrective regimen sliding scale  insulin regular  human recombinant  insulin regular  human recombinant  insulin regular  human recombinant  levothyroxine  magnesium sulfate  IVPB  multivitamin/minerals/iron Oral Solution (CENTRUM)  pantoprazole  Injectable  predniSONE  Solution  simethicone  vancomycin    Solution  lactobacillus acidophilus     Pertinent Labs:  @ 11:01: Na 136, BUN 19, Cr <0.30<L>, <H>, K+ 3.6, Phos 3.6, Mg 1.9, Alk Phos 104, ALT/SGPT 77<H>, AST/SGOT 92<H>, HbA1c --    A1C with Estimated Average Glucose Result: 6.2 % (24 @ 07:40)    Finger Sticks:  POCT Blood Glucose.: 221 mg/dL ( @ 11:57)  POCT Blood Glucose.: 93 mg/dL ( @ 05:20)  POCT Blood Glucose.: 184 mg/dL ( @ 23:27)  POCT Blood Glucose.: 243 mg/dL ( @ 17:27)    Skin per nursing documentation: sacrum stage IV, left ischium stage IV  Edema per nursing documentation: trace generalized     Estimated Needs based on dosing weight 48.1kg   30-35kcal/kg 1443-1683kcal/day  1.3-1.6g/kg 62-77g protein/day  defer fluid needs to team     Previous Nutrition Diagnosis: Increased Nutrient Needs, severe acute malnutrition  Nutrition Diagnosis is: [x] ongoing  [] resolved [] not applicable     Nutrition Care Plan:  [x] In Progress  [] Achieved  [] Not applicable    New Nutrition Diagnosis: [x] Not applicable    Nutrition Interventions:  Spoke to pt daughter today. Explained pt calorie and protein requirements. Explained that patient meeting nutritional needs without prosource. Daughter states that she would like to continue prosource despite RD recommendations. RD discussed current vitamin/mineral regimen pt currently ordered for. Daughter requesting to increase tube feeding recommendations to five bottles/daily (1625ml total volume). RD explained that is not my recommendation and would not advise this increase as pt (again) meeting calorie and protein requirements with current tube feeding order.     Recommendations:      -Continue [employer] Peptide 1.5. [employer] is a vegan formula with a low glycemic index and made to support GI tolerance. Pt with c.diff and elevated Hgb A1c. RD recommends discontinuing No Carb Prosource TF as this will exceed pt protein requirements. [employer] Peptide 1.5 55ml/hr x 18 hours will provide pt with a total volume of 990ml, 1525kcal/day (32kcal/kg), 73g protein (1.5g/kg). pt daughter requesting to continue prosource despite RD recommendations.  -Continue 18 hour regimen to allow time for synthroid administration.   -Defer antihyperglycemic regimen to team.   -Can continue banatrol per team to aid in stool bulking if no contraindications.   -Can continue probiotic yogurt and lactobacillus acidophilus per team to aid in maintain gut health if no contraindications.  -Continue Vitamin D, Vitamin C and Multivitamin to aid in wound healing if no contraindications. Can continue Alfred due to same.   -Defer free water flush to team.   -Recommendations discussed with , PA and pt daughter.     Monitoring and Evaluation:   Continue to monitor nutritional intake, tolerance to diet prescription, weights, labs, skin integrity    RD remains available upon request and will follow up per protocol  Rufina Morris, MS, RD, CDN / Teams Nutrition Follow Up Note  Patient seen for: follow up    Source: [] Patient       [x] Medical Record        [] RN        [x] Family over phone (trisha Woods [phone])      [x] Other: case management, PA    -If unable to interview patient: [x] Trach/Vent/BiPAP  [] Disoriented/confused/inappropriate to interview    Diet Order:   Diet, NPO with Tube Feed:   Tube Feeding Modality: Jejunostomy  [employer] Peptide 1.5 (KFPEPT1.5RTH)  Total Volume for 24 Hours (mL): 990  Continuous  Starting Tube Feed Rate {mL per Hour}: 50  Increase Tube Feed Rate by (mL): 5     Every 24 hours  Until Goal Tube Feed Rate (mL per Hour): 55  Tube Feed Duration (in Hours): 18  Tube Feed Start Time: 06:00  Tube Feed Stop Time: 00:00  Free Water Flush  Pump   Rate (mL per Hour): 20   Frequency: Every 2 Hours  Alfred(7 Gm Arginine/7 Gm Glut/1.2 Gm HMB     Qty per Day:  2  No Carb Prosource TF     Qty per Day:  1  Banatrol TF     Qty per Day:  1  Supplement Feeding Modality:  Gastrostomy  Probiotic Yogurt/Smoothie Cans or Servings Per Day:  1       Frequency:  Two Times a day (24)    EN order with prosource providing at 100% provision: 1615kcal (33.5kcal/kg) and 88g protein (1.8g/kg)    - Is current order appropriate/adequate? see below for recommendations    Nutrition-related concerns:  -Pt vegan, however daughter agreeable to Alfred and Probiotic Yogurt per conversation today. [employer] Peptide 1.5 is a vegan EN formula.   - Swallow FEES Assessment Adult Recommendations: "Primary recommendation is NPO however with keeping aligned to goc/poc and for quality of life purposes would allow comfort feeds with certain parameters. Puree and thin liquids (tsp or straw) only allowed 1-2x/day with 100% assist/supervision with educated staff/family, 2-4 ounces total only, and 10 minute maximum with feeding task. Ensure pt as close to 90 degrees as able. Ensure pt on VENT PARAMETERS that were present during this study; VOLUME , 12/30%/5."  -Hgb A1c 6.2%. Insulin regimen ordered per team to aid in maintaining glycemic control. [employer] Peptide 1.5 has a low glycemic index per [employer].   -Synthroid via PEG ordered   -s/p G-J tube placement. Per GI: feeds thru j-tube, vent g-tube as needed.      GI: c.diff per chart with rectal tube. see flow sheets for output.  Bowel Regimen: Banatrol    Weights:   Dosing weight 48.1kg  Daily Weight in k.8 ()  Some changes may be due to fluid shifts. RD will continue to monitor trends.     Nutritionally Pertinent MEDICATIONS  (STANDING):  amLODIPine   Tablet  ascorbic acid  bismuth subsalicylate Liquid  cholecalciferol  insulin glargine Injectable (LANTUS)  insulin regular  human corrective regimen sliding scale  insulin regular  human recombinant  insulin regular  human recombinant  insulin regular  human recombinant  levothyroxine  magnesium sulfate  IVPB  multivitamin/minerals/iron Oral Solution (CENTRUM)  pantoprazole  Injectable  predniSONE  Solution  simethicone  vancomycin    Solution  lactobacillus acidophilus     Pertinent Labs:  @ 11:01: Na 136, BUN 19, Cr <0.30<L>, <H>, K+ 3.6, Phos 3.6, Mg 1.9, Alk Phos 104, ALT/SGPT 77<H>, AST/SGOT 92<H>, HbA1c --    A1C with Estimated Average Glucose Result: 6.2 % (24 @ 07:40)    Finger Sticks:  POCT Blood Glucose.: 221 mg/dL ( @ 11:57)  POCT Blood Glucose.: 93 mg/dL ( @ 05:20)  POCT Blood Glucose.: 184 mg/dL ( @ 23:27)  POCT Blood Glucose.: 243 mg/dL ( @ 17:27)    Skin per nursing documentation: sacrum stage IV, left ischium stage IV  Edema per nursing documentation: trace generalized     Estimated Needs based on dosing weight 48.1kg   30-35kcal/kg 1443-1683kcal/day  1.3-1.6g/kg 62-77g protein/day  defer fluid needs to team     Previous Nutrition Diagnosis: Increased Nutrient Needs, severe acute malnutrition  Nutrition Diagnosis is: [x] ongoing  [] resolved [] not applicable     Nutrition Care Plan:  [x] In Progress  [] Achieved  [] Not applicable    New Nutrition Diagnosis: [x] Not applicable    Nutrition Interventions:  Spoke to pt daughter today. Explained pt calorie and protein requirements. Explained that patient meeting nutritional needs without prosource. Daughter states that she would like to continue prosource despite RD recommendations. RD discussed current vitamin/mineral regimen pt currently ordered for. Daughter requesting to increase tube feeding recommendations to five bottles/daily (1625ml total volume). RD explained that is not my recommendation and would not advise this increase as pt (again) meeting calorie and protein requirements with current tube feeding order.     Recommendations:      -Continue [employer] Peptide 1.5. [employer] is a vegan formula with a low glycemic index and made to support GI tolerance. Pt with c.diff and elevated Hgb A1c. RD recommends discontinuing No Carb Prosource TF as this will exceed pt protein requirements. [employer] Peptide 1.5 55ml/hr x 18 hours will provide pt with a total volume of 990ml, 1525kcal/day (32kcal/kg), 73g protein (1.5g/kg). pt daughter requesting to continue prosource despite RD recommendations. Defer continuing prosource to medical team.   -Continue 18 hour regimen to allow time for synthroid administration.   -Defer antihyperglycemic regimen to team.   -Can continue banatrol per team to aid in stool bulking if no contraindications.   -Can continue probiotic yogurt and lactobacillus acidophilus per team to aid in maintain gut health if no contraindications.  -Continue Vitamin D, Vitamin C and Multivitamin to aid in wound healing if no contraindications. Can continue Alfred due to same.   -Defer free water flush to team.   -Recommendations discussed with , PA and pt daughter.     Monitoring and Evaluation:   Continue to monitor nutritional intake, tolerance to diet prescription, weights, labs, skin integrity    RD remains available upon request and will follow up per protocol  Rufina Morris, MS, RD, CDN / Teams

## 2024-04-29 NOTE — CHART NOTE - NSCHARTNOTEFT_GEN_A_CORE
Age: 74y    Gender: Female    POCT Blood Glucose:  221 mg/dL (04-29-24 @ 11:57)  93 mg/dL (04-29-24 @ 05:20)  184 mg/dL (04-28-24 @ 23:27)  243 mg/dL (04-28-24 @ 17:27)      eMAR:  insulin glargine Injectable (LANTUS)   12 Unit(s) SubCutaneous (04-28-24 @ 17:57)    insulin regular  human corrective regimen sliding scale   4 Unit(s) SubCutaneous (04-29-24 @ 12:40)   0 Unit(s) SubCutaneous (04-29-24 @ 05:59)   2 Unit(s) SubCutaneous (04-28-24 @ 23:42)   4 Unit(s) SubCutaneous (04-28-24 @ 17:59)    insulin regular  human recombinant   7 Unit(s) SubCutaneous (04-28-24 @ 18:00)    insulin regular  human recombinant   9 Unit(s) SubCutaneous (04-29-24 @ 12:42)    insulin regular  human recombinant   11 Unit(s) SubCutaneous (04-29-24 @ 05:59)    levothyroxine   125 MICROGram(s) Oral (04-29-24 @ 05:59)    predniSONE  Solution   5 milliGRAM(s) Oral (04-29-24 @ 09:13)     POC glucose, insulin requirements, lab values reviewed.     Uncontrolled Type 2 Diabetes Mellitus   #Hyperglycemia on Tube feeds  Currently of KE Frazier 1.5 @ 55cc/hr.   -  INPATIENT PLAN:  - continue Lantus 12 units at 6PM  - Continue Regular insulin 11 units at 0600,  9 units at 1200, continue 7 units at 1800,  NO DOSE AT MN !(HOLD if tube feeds are stopped).  - Continue Moderate dose Regular insulin scale at 0600,1200,1800.   - Discontinued moderate dose regular scale at Midnight due to BG in 90s at 0600.    Contact via [employer] Teams during business hours  To reach covering provider access AMION via sun[employer] tools  For Urgent matters/after-hours/weekends/holidays please page endocrine fellow on call   For nonurgent matters please email [email or web address]    Please note that this patient may be followed by different provider tomorrow.  Notify endocrine 24 hours prior to discharge for final recommendations

## 2024-04-29 NOTE — PROGRESS NOTE ADULT - SUBJECTIVE AND OBJECTIVE BOX
Patient is a 74y old  Female who presents with a chief complaint of Dislodged G Tube (28 Apr 2024 07:47)      Interval Events:    REVIEW OF SYSTEMS:  [ ] Positive  [ ] All other systems negative  [ ] Unable to assess ROS because ________    Vital Signs Last 24 Hrs  T(C): 37.3 (04-29-24 @ 05:26), Max: 37.3 (04-29-24 @ 05:26)  T(F): 99.1 (04-29-24 @ 05:26), Max: 99.1 (04-29-24 @ 05:26)  HR: 83 (04-29-24 @ 05:40) (74 - 98)  BP: 127/65 (04-29-24 @ 05:26) (120/55 - 138/58)  RR: 16 (04-29-24 @ 05:26) (16 - 19)  SpO2: 100% (04-29-24 @ 05:40) (99% - 100%)    PHYSICAL EXAM:  HEENT:   [ ]Tracheostomy:  [ ]Pupils equal  [ ]No oral lesions  [ ]Abnormal    SKIN  [ ]No Rash  [ ] Abnormal  [ ] pressure    CARDIAC  [ ]Regular  [ ]Abnormal    PULMONARY  [ ]Bilateral Clear Breath Sounds  [ ]Normal Excursion  [ ]Abnormal    GI  [ ]PEG      [ ] +BS		              [ ]Soft, nondistended, nontender	  [ ]Abnormal    MUSCULOSKELETAL                                   [ ]Bedbound                 [ ]Abnormal    [ ]Ambulatory/OOB to chair                           EXTREMITIES                                         [ ]Normal  [ ]Edema                           NEUROLOGIC  [ ] Normal, non focal  [ ] Focal findings:    PSYCHIATRIC  [ ]Alert and appropriate  [ ] Sedated	 [ ]Agitated    :  Mcbride: [ ] Yes, if yes: Date of Placement:                   [  ] No    LINES: Central Lines [ ] Yes, if yes: Date of Placement                                     [  ] No    HOSPITAL MEDICATIONS:  MEDICATIONS  (STANDING):  albuterol/ipratropium for Nebulization 3 milliLiter(s) Nebulizer every 6 hours  amLODIPine   Tablet 10 milliGRAM(s) Oral <User Schedule>  artificial tears (preservative free) Ophthalmic Solution 1 Drop(s) Both EYES every 2 hours  ascorbic acid 500 milliGRAM(s) Oral daily  Biotene Dry Mouth Oral Rinse 5 milliLiter(s) Swish and Spit every 6 hours  chlorhexidine 0.12% Liquid 15 milliLiter(s) Oral Mucosa every 12 hours  chlorhexidine 4% Liquid 1 Application(s) Topical <User Schedule>  cholecalciferol 2000 Unit(s) Oral daily  diclofenac sodium 1% Gel 4 Gram(s) Topical four times a day  erythromycin   Ointment 1 Application(s) Both EYES at bedtime  escitalopram 10 milliGRAM(s) Oral <User Schedule>  fentaNYL   Patch  25 MICROgram(s)/Hr 1 Patch Transdermal every 72 hours  gabapentin Solution 100 milliGRAM(s) Oral <User Schedule>  insulin glargine Injectable (LANTUS) 12 Unit(s) SubCutaneous <User Schedule>  insulin regular  human corrective regimen sliding scale   SubCutaneous every 6 hours  insulin regular  human recombinant 11 Unit(s) SubCutaneous <User Schedule>  insulin regular  human recombinant 9 Unit(s) SubCutaneous <User Schedule>  insulin regular  human recombinant 7 Unit(s) SubCutaneous <User Schedule>  lactobacillus acidophilus 1 Tablet(s) Oral daily  levothyroxine 125 MICROGram(s) Oral daily  lidocaine   4% Patch 1 Patch Transdermal at bedtime  lidocaine   4% Patch 1 Patch Transdermal at bedtime  melatonin 3 milliGRAM(s) Oral at bedtime  multivitamin/minerals/iron Oral Solution (CENTRUM) 15 milliLiter(s) Oral daily  pantoprazole  Injectable 40 milliGRAM(s) IV Push daily  prednisoLONE acetate 1% Suspension 1 Drop(s) Both EYES four times a day  predniSONE  Solution 5 milliGRAM(s) Oral <User Schedule>  QUEtiapine 12.5 milliGRAM(s) Oral <User Schedule>  simethicone 80 milliGRAM(s) Chew every 6 hours  surgical lubricant sterile 1 Application(s) Topical every 8 hours  vancomycin    Solution 125 milliGRAM(s) Enteral Tube daily    MEDICATIONS  (PRN):  acetaminophen   Oral Liquid .. 1000 milliGRAM(s) Enteral Tube every 6 hours PRN Temp greater or equal to 38C (100.4F), Mild Pain (1 - 3)  calamine/zinc oxide Lotion 1 Application(s) Topical daily PRN Rash and/or Itching  ondansetron Injectable 4 milliGRAM(s) IV Push every 8 hours PRN Nausea and/or Vomiting  sodium chloride 0.65% Nasal 1 Spray(s) Both Nostrils every 12 hours PRN Congestion      LABS:                  CAPILLARY BLOOD GLUCOSE    MICROBIOLOGY:     RADIOLOGY:  [ ] Reviewed and interpreted by me    Mode: AC/ CMV (Assist Control/ Continuous Mandatory Ventilation)  RR (machine): 12  TV (machine): 350  FiO2: 30  PEEP: 5  ITime: 1  MAP: 7  PIP: 24   Patient is a 74y old  Female who presents with a chief complaint of Dislodged G Tube (28 Apr 2024 07:47)      Interval Events:  No acute events overnight.     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  [X] pressure - sacral/BL buttocks stage 4 pressure injury     CARDIAC  [X]Regular  [ ]Abnormal    PULMONARY  [X]Bilateral Clear Breath Sounds  [ ]Normal Excursion  [ ]Abnormal    GI  [X]PEG - PEG-J site c/d/i; old PEG site w/ mild clear leakage   [X] +BS		              [X]Soft, nondistended, nontender	  [X]Abnormal - rectal tube    MUSCULOSKELETAL                                   [X]Bedbound                 [ ]Abnormal    [ ]Ambulatory/OOB to chair                           EXTREMITIES                                         [X]Normal  [ ]Edema                           NEUROLOGIC  [X] Normal, non focal  [ ] Focal findings:    PSYCHIATRIC  [X]Alert and appropriate  [ ] Sedated	 [ ]Agitated    :  Mcbride: [ ] Yes, if yes: Date of Placement:                   [X] No    LINES: Central Lines [ ] Yes, if yes: Date of Placement                                     [X] No    HOSPITAL MEDICATIONS:  MEDICATIONS  (STANDING):  albuterol/ipratropium for Nebulization 3 milliLiter(s) Nebulizer every 6 hours  amLODIPine   Tablet 10 milliGRAM(s) Oral <User Schedule>  artificial tears (preservative free) Ophthalmic Solution 1 Drop(s) Both EYES every 2 hours  ascorbic acid 500 milliGRAM(s) Oral daily  Biotene Dry Mouth Oral Rinse 5 milliLiter(s) Swish and Spit every 6 hours  chlorhexidine 0.12% Liquid 15 milliLiter(s) Oral Mucosa every 12 hours  chlorhexidine 4% Liquid 1 Application(s) Topical <User Schedule>  cholecalciferol 2000 Unit(s) Oral daily  diclofenac sodium 1% Gel 4 Gram(s) Topical four times a day  erythromycin   Ointment 1 Application(s) Both EYES at bedtime  escitalopram 10 milliGRAM(s) Oral <User Schedule>  fentaNYL   Patch  25 MICROgram(s)/Hr 1 Patch Transdermal every 72 hours  gabapentin Solution 100 milliGRAM(s) Oral <User Schedule>  insulin glargine Injectable (LANTUS) 12 Unit(s) SubCutaneous <User Schedule>  insulin regular  human corrective regimen sliding scale   SubCutaneous every 6 hours  insulin regular  human recombinant 11 Unit(s) SubCutaneous <User Schedule>  insulin regular  human recombinant 9 Unit(s) SubCutaneous <User Schedule>  insulin regular  human recombinant 7 Unit(s) SubCutaneous <User Schedule>  lactobacillus acidophilus 1 Tablet(s) Oral daily  levothyroxine 125 MICROGram(s) Oral daily  lidocaine   4% Patch 1 Patch Transdermal at bedtime  lidocaine   4% Patch 1 Patch Transdermal at bedtime  melatonin 3 milliGRAM(s) Oral at bedtime  multivitamin/minerals/iron Oral Solution (CENTRUM) 15 milliLiter(s) Oral daily  pantoprazole  Injectable 40 milliGRAM(s) IV Push daily  prednisoLONE acetate 1% Suspension 1 Drop(s) Both EYES four times a day  predniSONE  Solution 5 milliGRAM(s) Oral <User Schedule>  QUEtiapine 12.5 milliGRAM(s) Oral <User Schedule>  simethicone 80 milliGRAM(s) Chew every 6 hours  surgical lubricant sterile 1 Application(s) Topical every 8 hours  vancomycin    Solution 125 milliGRAM(s) Enteral Tube daily    MEDICATIONS  (PRN):  acetaminophen   Oral Liquid .. 1000 milliGRAM(s) Enteral Tube every 6 hours PRN Temp greater or equal to 38C (100.4F), Mild Pain (1 - 3)  calamine/zinc oxide Lotion 1 Application(s) Topical daily PRN Rash and/or Itching  ondansetron Injectable 4 milliGRAM(s) IV Push every 8 hours PRN Nausea and/or Vomiting  sodium chloride 0.65% Nasal 1 Spray(s) Both Nostrils every 12 hours PRN Congestion    LABS:    CAPILLARY BLOOD GLUCOSE    MICROBIOLOGY:     RADIOLOGY:  [ ] Reviewed and interpreted by me    Mode: AC/ CMV (Assist Control/ Continuous Mandatory Ventilation)  RR (machine): 12  TV (machine): 350  FiO2: 30  PEEP: 5  ITime: 1  MAP: 7  PIP: 24

## 2024-04-30 LAB
ANION GAP SERPL CALC-SCNC: 14 MMOL/L — SIGNIFICANT CHANGE UP (ref 5–17)
BUN SERPL-MCNC: 15 MG/DL — SIGNIFICANT CHANGE UP (ref 7–23)
CALCIUM SERPL-MCNC: 9 MG/DL — SIGNIFICANT CHANGE UP (ref 8.4–10.5)
CHLORIDE SERPL-SCNC: 99 MMOL/L — SIGNIFICANT CHANGE UP (ref 96–108)
CO2 SERPL-SCNC: 20 MMOL/L — LOW (ref 22–31)
CREAT SERPL-MCNC: <0.3 MG/DL — LOW (ref 0.5–1.3)
EGFR: 113 ML/MIN/1.73M2 — SIGNIFICANT CHANGE UP
GLUCOSE BLDC GLUCOMTR-MCNC: 124 MG/DL — HIGH (ref 70–99)
GLUCOSE BLDC GLUCOMTR-MCNC: 152 MG/DL — HIGH (ref 70–99)
GLUCOSE BLDC GLUCOMTR-MCNC: 193 MG/DL — HIGH (ref 70–99)
GLUCOSE BLDC GLUCOMTR-MCNC: 278 MG/DL — HIGH (ref 70–99)
GLUCOSE SERPL-MCNC: 101 MG/DL — HIGH (ref 70–99)
HCT VFR BLD CALC: 28.7 % — LOW (ref 34.5–45)
HGB BLD-MCNC: 8.8 G/DL — LOW (ref 11.5–15.5)
MAGNESIUM SERPL-MCNC: 1.8 MG/DL — SIGNIFICANT CHANGE UP (ref 1.6–2.6)
MCHC RBC-ENTMCNC: 27.8 PG — SIGNIFICANT CHANGE UP (ref 27–34)
MCHC RBC-ENTMCNC: 30.7 GM/DL — LOW (ref 32–36)
MCV RBC AUTO: 90.5 FL — SIGNIFICANT CHANGE UP (ref 80–100)
NRBC # BLD: 0 /100 WBCS — SIGNIFICANT CHANGE UP (ref 0–0)
PHOSPHATE SERPL-MCNC: 2.6 MG/DL — SIGNIFICANT CHANGE UP (ref 2.5–4.5)
PLATELET # BLD AUTO: 190 K/UL — SIGNIFICANT CHANGE UP (ref 150–400)
POTASSIUM SERPL-MCNC: 3.7 MMOL/L — SIGNIFICANT CHANGE UP (ref 3.5–5.3)
POTASSIUM SERPL-SCNC: 3.7 MMOL/L — SIGNIFICANT CHANGE UP (ref 3.5–5.3)
RBC # BLD: 3.17 M/UL — LOW (ref 3.8–5.2)
RBC # FLD: 21.1 % — HIGH (ref 10.3–14.5)
SODIUM SERPL-SCNC: 133 MMOL/L — LOW (ref 135–145)
WBC # BLD: 9.99 K/UL — SIGNIFICANT CHANGE UP (ref 3.8–10.5)
WBC # FLD AUTO: 9.99 K/UL — SIGNIFICANT CHANGE UP (ref 3.8–10.5)

## 2024-04-30 PROCEDURE — 99232 SBSQ HOSP IP/OBS MODERATE 35: CPT | Mod: FS,25

## 2024-04-30 PROCEDURE — 31615 TRCHEOBRNCHSC EST TRACHS INC: CPT

## 2024-04-30 PROCEDURE — 99233 SBSQ HOSP IP/OBS HIGH 50: CPT | Mod: FS,GC

## 2024-04-30 PROCEDURE — 99232 SBSQ HOSP IP/OBS MODERATE 35: CPT

## 2024-04-30 RX ORDER — ACETAMINOPHEN 500 MG
30 TABLET ORAL
Qty: 3600 | Refills: 3
Start: 2024-04-30 | End: 2024-08-27

## 2024-04-30 RX ORDER — AMLODIPINE BESYLATE 2.5 MG/1
1 TABLET ORAL
Qty: 30 | Refills: 3
Start: 2024-04-30 | End: 2024-08-27

## 2024-04-30 RX ORDER — IPRATROPIUM/ALBUTEROL SULFATE 18-103MCG
3 AEROSOL WITH ADAPTER (GRAM) INHALATION
Qty: 99 | Refills: 3
Start: 2024-04-30 | End: 2024-08-27

## 2024-04-30 RX ORDER — OXYCODONE HYDROCHLORIDE 5 MG/1
10 TABLET ORAL
Qty: 280 | Refills: 0
Start: 2024-04-30 | End: 2024-05-06

## 2024-04-30 RX ORDER — INSULIN HUMAN 100 [IU]/ML
9 INJECTION, SOLUTION SUBCUTANEOUS
Qty: 3 | Refills: 3
Start: 2024-04-30 | End: 2024-08-27

## 2024-04-30 RX ORDER — VANCOMYCIN HCL 1 G
25 VIAL (EA) INTRAVENOUS
Qty: 3 | Refills: 0
Start: 2024-04-30 | End: 2024-05-29

## 2024-04-30 RX ORDER — ESCITALOPRAM OXALATE 10 MG/1
1 TABLET, FILM COATED ORAL
Qty: 30 | Refills: 3
Start: 2024-04-30 | End: 2024-08-27

## 2024-04-30 RX ORDER — ERYTHROMYCIN BASE 5 MG/GRAM
1 OINTMENT (GRAM) OPHTHALMIC (EYE)
Qty: 30 | Refills: 0
Start: 2024-04-30 | End: 2024-05-29

## 2024-04-30 RX ORDER — INSULIN HUMAN 100 [IU]/ML
7 INJECTION, SOLUTION SUBCUTANEOUS
Qty: 3 | Refills: 0
Start: 2024-04-30 | End: 2024-05-29

## 2024-04-30 RX ORDER — GABAPENTIN 400 MG/1
100 CAPSULE ORAL
Qty: 500 | Refills: 4
Start: 2024-04-30 | End: 2024-09-26

## 2024-04-30 RX ORDER — QUETIAPINE FUMARATE 200 MG/1
0.5 TABLET, FILM COATED ORAL
Qty: 15 | Refills: 3
Start: 2024-04-30 | End: 2024-08-27

## 2024-04-30 RX ORDER — LANOLIN ALCOHOL/MO/W.PET/CERES
12 CREAM (GRAM) TOPICAL
Qty: 360 | Refills: 3
Start: 2024-04-30 | End: 2024-08-27

## 2024-04-30 RX ORDER — MAGNESIUM SULFATE 500 MG/ML
2 VIAL (ML) INJECTION ONCE
Refills: 0 | Status: COMPLETED | OUTPATIENT
Start: 2024-04-30 | End: 2024-04-30

## 2024-04-30 RX ORDER — CALAMINE 8% AND ZINC OXIDE 8% 160 MG/ML
1 LOTION TOPICAL
Qty: 30 | Refills: 1
Start: 2024-04-30 | End: 2024-06-28

## 2024-04-30 RX ORDER — NALOXONE HYDROCHLORIDE 4 MG/.1ML
1 SPRAY NASAL
Qty: 2 | Refills: 0
Start: 2024-04-30 | End: 2024-05-29

## 2024-04-30 RX ORDER — LEVOTHYROXINE SODIUM 125 MCG
1 TABLET ORAL
Qty: 30 | Refills: 3
Start: 2024-04-30 | End: 2024-08-27

## 2024-04-30 RX ORDER — PANTOPRAZOLE SODIUM 20 MG/1
40 TABLET, DELAYED RELEASE ORAL DAILY
Refills: 0 | Status: DISCONTINUED | OUTPATIENT
Start: 2024-04-30 | End: 2024-05-01

## 2024-04-30 RX ORDER — MULTIVIT-MIN/FERROUS GLUCONATE 9 MG/15 ML
15 LIQUID (ML) ORAL
Qty: 450 | Refills: 3
Start: 2024-04-30 | End: 2024-08-27

## 2024-04-30 RX ORDER — OXYCODONE HYDROCHLORIDE 5 MG/1
5 TABLET ORAL
Qty: 140 | Refills: 0
Start: 2024-04-30 | End: 2024-05-06

## 2024-04-30 RX ORDER — INSULIN GLARGINE 100 [IU]/ML
12 INJECTION, SOLUTION SUBCUTANEOUS
Qty: 2 | Refills: 3
Start: 2024-04-30 | End: 2024-08-27

## 2024-04-30 RX ORDER — ASCORBIC ACID 60 MG
1 TABLET,CHEWABLE ORAL
Qty: 30 | Refills: 3
Start: 2024-04-30 | End: 2024-08-27

## 2024-04-30 RX ORDER — PREDNISOLONE SODIUM PHOSPHATE 1 %
1 DROPS OPHTHALMIC (EYE)
Qty: 5 | Refills: 3
Start: 2024-04-30 | End: 2024-08-27

## 2024-04-30 RX ORDER — NYSTATIN CREAM 100000 [USP'U]/G
1 CREAM TOPICAL
Qty: 0 | Refills: 0 | DISCHARGE
Start: 2024-04-30

## 2024-04-30 RX ORDER — INSULIN HUMAN 100 [IU]/ML
11 INJECTION, SOLUTION SUBCUTANEOUS
Qty: 3 | Refills: 3
Start: 2024-04-30 | End: 2024-08-27

## 2024-04-30 RX ORDER — LACTOBACILLUS ACIDOPHILUS 100MM CELL
1 CAPSULE ORAL
Qty: 0 | Refills: 0 | DISCHARGE
Start: 2024-04-30

## 2024-04-30 RX ORDER — LACTOBACILLUS ACIDOPHILUS 100MM CELL
1 CAPSULE ORAL
Qty: 30 | Refills: 0
Start: 2024-04-30 | End: 2024-05-29

## 2024-04-30 RX ORDER — NYSTATIN CREAM 100000 [USP'U]/G
1 CREAM TOPICAL
Qty: 30 | Refills: 0
Start: 2024-04-30 | End: 2024-05-29

## 2024-04-30 RX ORDER — SIMETHICONE 80 MG/1
1 TABLET, CHEWABLE ORAL
Qty: 120 | Refills: 3
Start: 2024-04-30 | End: 2024-08-27

## 2024-04-30 RX ORDER — NYSTATIN CREAM 100000 [USP'U]/G
1 CREAM TOPICAL EVERY 12 HOURS
Refills: 0 | Status: DISCONTINUED | OUTPATIENT
Start: 2024-04-30 | End: 2024-05-01

## 2024-04-30 RX ORDER — DICLOFENAC SODIUM 30 MG/G
1 GEL TOPICAL
Qty: 3 | Refills: 3
Start: 2024-04-30 | End: 2024-08-27

## 2024-04-30 RX ADMIN — Medication 3 MILLILITER(S): at 17:29

## 2024-04-30 RX ADMIN — Medication 1 DROP(S): at 09:03

## 2024-04-30 RX ADMIN — DICLOFENAC SODIUM 4 GRAM(S): 30 GEL TOPICAL at 00:13

## 2024-04-30 RX ADMIN — SIMETHICONE 80 MILLIGRAM(S): 80 TABLET, CHEWABLE ORAL at 06:11

## 2024-04-30 RX ADMIN — Medication 1 APPLICATION(S): at 22:49

## 2024-04-30 RX ADMIN — DICLOFENAC SODIUM 4 GRAM(S): 30 GEL TOPICAL at 18:20

## 2024-04-30 RX ADMIN — Medication 5 MILLILITER(S): at 18:19

## 2024-04-30 RX ADMIN — Medication 2000 UNIT(S): at 11:40

## 2024-04-30 RX ADMIN — Medication 1 DROP(S): at 00:12

## 2024-04-30 RX ADMIN — Medication 1 DROP(S): at 18:20

## 2024-04-30 RX ADMIN — SIMETHICONE 80 MILLIGRAM(S): 80 TABLET, CHEWABLE ORAL at 23:44

## 2024-04-30 RX ADMIN — CHLORHEXIDINE GLUCONATE 15 MILLILITER(S): 213 SOLUTION TOPICAL at 06:12

## 2024-04-30 RX ADMIN — INSULIN GLARGINE 12 UNIT(S): 100 INJECTION, SOLUTION SUBCUTANEOUS at 18:20

## 2024-04-30 RX ADMIN — Medication 25 GRAM(S): at 11:45

## 2024-04-30 RX ADMIN — Medication 1 DROP(S): at 06:11

## 2024-04-30 RX ADMIN — Medication 5 MILLILITER(S): at 06:09

## 2024-04-30 RX ADMIN — Medication 1 DROP(S): at 04:41

## 2024-04-30 RX ADMIN — Medication 1 DROP(S): at 16:32

## 2024-04-30 RX ADMIN — Medication 1 DROP(S): at 02:40

## 2024-04-30 RX ADMIN — DICLOFENAC SODIUM 4 GRAM(S): 30 GEL TOPICAL at 06:12

## 2024-04-30 RX ADMIN — Medication 1 DROP(S): at 06:14

## 2024-04-30 RX ADMIN — Medication 125 MICROGRAM(S): at 06:12

## 2024-04-30 RX ADMIN — Medication 125 MILLIGRAM(S): at 11:41

## 2024-04-30 RX ADMIN — SIMETHICONE 80 MILLIGRAM(S): 80 TABLET, CHEWABLE ORAL at 11:39

## 2024-04-30 RX ADMIN — SIMETHICONE 80 MILLIGRAM(S): 80 TABLET, CHEWABLE ORAL at 18:17

## 2024-04-30 RX ADMIN — Medication 1 APPLICATION(S): at 13:01

## 2024-04-30 RX ADMIN — Medication 1000 MILLIGRAM(S): at 23:50

## 2024-04-30 RX ADMIN — INSULIN HUMAN 9 UNIT(S): 100 INJECTION, SOLUTION SUBCUTANEOUS at 11:45

## 2024-04-30 RX ADMIN — LIDOCAINE 1 PATCH: 4 CREAM TOPICAL at 06:16

## 2024-04-30 RX ADMIN — LIDOCAINE 1 PATCH: 4 CREAM TOPICAL at 22:47

## 2024-04-30 RX ADMIN — DICLOFENAC SODIUM 4 GRAM(S): 30 GEL TOPICAL at 23:45

## 2024-04-30 RX ADMIN — Medication 5 MILLILITER(S): at 23:44

## 2024-04-30 RX ADMIN — Medication 1 DROP(S): at 13:01

## 2024-04-30 RX ADMIN — Medication 1 TABLET(S): at 11:40

## 2024-04-30 RX ADMIN — Medication 3 MILLILITER(S): at 05:54

## 2024-04-30 RX ADMIN — QUETIAPINE FUMARATE 12.5 MILLIGRAM(S): 200 TABLET, FILM COATED ORAL at 14:21

## 2024-04-30 RX ADMIN — Medication 5 MILLILITER(S): at 11:42

## 2024-04-30 RX ADMIN — Medication 15 MILLILITER(S): at 06:11

## 2024-04-30 RX ADMIN — GABAPENTIN 100 MILLIGRAM(S): 400 CAPSULE ORAL at 09:03

## 2024-04-30 RX ADMIN — CHLORHEXIDINE GLUCONATE 15 MILLILITER(S): 213 SOLUTION TOPICAL at 18:19

## 2024-04-30 RX ADMIN — Medication 1000 MILLIGRAM(S): at 22:46

## 2024-04-30 RX ADMIN — PANTOPRAZOLE SODIUM 40 MILLIGRAM(S): 20 TABLET, DELAYED RELEASE ORAL at 13:01

## 2024-04-30 RX ADMIN — Medication 5 MILLIGRAM(S): at 09:53

## 2024-04-30 RX ADMIN — Medication 1 DROP(S): at 19:41

## 2024-04-30 RX ADMIN — Medication 1 DROP(S): at 10:01

## 2024-04-30 RX ADMIN — LIDOCAINE 1 PATCH: 4 CREAM TOPICAL at 09:53

## 2024-04-30 RX ADMIN — INSULIN HUMAN 11 UNIT(S): 100 INJECTION, SOLUTION SUBCUTANEOUS at 06:13

## 2024-04-30 RX ADMIN — CHLORHEXIDINE GLUCONATE 1 APPLICATION(S): 213 SOLUTION TOPICAL at 06:13

## 2024-04-30 RX ADMIN — DICLOFENAC SODIUM 4 GRAM(S): 30 GEL TOPICAL at 11:42

## 2024-04-30 RX ADMIN — Medication 3 MILLILITER(S): at 23:30

## 2024-04-30 RX ADMIN — INSULIN HUMAN 6: 100 INJECTION, SOLUTION SUBCUTANEOUS at 18:18

## 2024-04-30 RX ADMIN — Medication 5 MILLILITER(S): at 00:11

## 2024-04-30 RX ADMIN — NYSTATIN CREAM 1 APPLICATION(S): 100000 CREAM TOPICAL at 18:19

## 2024-04-30 RX ADMIN — SIMETHICONE 80 MILLIGRAM(S): 80 TABLET, CHEWABLE ORAL at 00:12

## 2024-04-30 RX ADMIN — INSULIN HUMAN 7 UNIT(S): 100 INJECTION, SOLUTION SUBCUTANEOUS at 18:18

## 2024-04-30 RX ADMIN — NYSTATIN CREAM 1 APPLICATION(S): 100000 CREAM TOPICAL at 06:14

## 2024-04-30 RX ADMIN — Medication 15 MILLILITER(S): at 13:01

## 2024-04-30 RX ADMIN — Medication 15 MILLILITER(S): at 11:41

## 2024-04-30 RX ADMIN — INSULIN HUMAN 2: 100 INJECTION, SOLUTION SUBCUTANEOUS at 11:46

## 2024-04-30 RX ADMIN — Medication 1 DROP(S): at 00:11

## 2024-04-30 RX ADMIN — Medication 500 MILLIGRAM(S): at 12:01

## 2024-04-30 RX ADMIN — Medication 3 MILLIGRAM(S): at 22:50

## 2024-04-30 RX ADMIN — Medication 3 MILLILITER(S): at 11:42

## 2024-04-30 RX ADMIN — Medication 1 DROP(S): at 23:44

## 2024-04-30 RX ADMIN — Medication 1 DROP(S): at 11:44

## 2024-04-30 RX ADMIN — GABAPENTIN 100 MILLIGRAM(S): 400 CAPSULE ORAL at 22:50

## 2024-04-30 RX ADMIN — Medication 1 APPLICATION(S): at 06:11

## 2024-04-30 RX ADMIN — Medication 1 DROP(S): at 11:38

## 2024-04-30 RX ADMIN — LIDOCAINE 1 PATCH: 4 CREAM TOPICAL at 22:48

## 2024-04-30 RX ADMIN — Medication 1 DROP(S): at 22:49

## 2024-04-30 RX ADMIN — ESCITALOPRAM OXALATE 10 MILLIGRAM(S): 10 TABLET, FILM COATED ORAL at 18:17

## 2024-04-30 NOTE — PROGRESS NOTE ADULT - PROBLEM SELECTOR PLAN 1
- HOB elevation  - Suction PRN  - Continue trach care  - No further ENT intervention  - Care per primary team.

## 2024-04-30 NOTE — PROGRESS NOTE ADULT - ASSESSMENT
75yo woman  h/o T2DM (4/4/24: A1C = 6.2%), HTN, HLD, anemia, hypothyroidism, RA, fibromyalgia, remote cerebral aneurysm repair, acute hypercapnic respiratory failure with tracheostomy, PEG tube (initially placed 2022), and bedbound, recurrent C diff presented for PEG tube dislodgment. Admitted 4/2/24  weight = 54.5 kg    Recurrent C diff - first episode Aug 2023 treated with tapering PO vanco, recurrent episode 1/31/24 treated with PO vanco and then fidaxomicin completed in Feb - most recently tested positive 3/20/24 seen by Dr. Newman 3/29 outpatient, started on fidaxomicin that day - tube feeds concurrently held, unclear if improving afterwards per family  Chronic sacral decubitus wound  3/20 Klebisella bacteruria R only to amp and sputum few Pseudomonas R to FQs w/o polys on gram stian - treatment of urine was deferred to avoid exacerbating C diff    Tmax 100, no leukocytosis  Concern for cellulitis around PEG tube site - area with very minimal erythema, remarkable receded from skin norm placed on admission  UA 11 WBC  CT a/p: no infectious focus or colitis  MRSA PCR+    4/3 IR - PEG exchanged bedside to 20 Fr Kang[employer]oo [employer]  - Bedside XR demonstrates appropriately positioned G-tube with contrast filling into the stomach and bowel.    4/2 Blood Cultures x 2 NGTD;  Positive MRSA/MSSA PCR  4/3 Urine cultures NEG  4/4 fever  4/4 Wound care assessment appreciated:   " area of persistent nonblanchable dark discoloration that is inconsistent with a patient's surrounding skin tone as well as a white juan j film noted over B/L buttocks/sacral skin, area measures approximately 10cm x 10cm x 0cm- presentation is consistent with a deep tissue injury in evolution with incontinence and fungal involvement present on admission. Within the apex of the gluteal cleft/base of sacrum there is full thickness skin loss with red, beefy tissue noted, area measures approximately 3cm x 1cm x 0.3cm- presentation is consistent with a deep tissue injury in evolution with incontinence involvement present on admission."  4/7 fever; Meropenem resumed  Positive Blood Culture x1 Serratia marcesens    4/16 OR: Gastrojejunostomy, percutaneous, endoscopic, Endoscopic assisted closure of PEG site. Endoscopic assisted percutaneous gastrojejunostomy tube placement.   4/19 K= 4.4  4/22 feeds @ 55 cc/hr  4/24 continued frequent bowel movements  - stool partially formed  4/25 Ophthalmology follow up: anterior scleritis OU  - Pt with hx of RA and sjogrens has bilateral scleral injection that is sectoral, no signs of conjunctivitis; ENT for trach exchange    Antibiotics  Meropenem 4/2 -->4/3; 4/7 --> 4/11  Cefepime 4/11 --> 4/14  IV Vanco 4/2  Fdaxomicin 4/3--> 4/8  PO Vanco 4/8 -->     Suggest:  maintain fluid and electrolytes with increased diarrhea -  Continue po Vanco  Extended po Vancomycin taper  Bezlotoxumab (Zinplava) at end of Vanco course as out pt    Vanco dosing schedule  Vancomycin 125 mg po 4 times daily 4/8 --> 4/18  Vancomycin 125 mg po 2 times daily 4/19 --> 4/25  Vancomycin 125 mg po once daily 4/26 -->5/2  Vancomycin 125 mg po once every other day 5/3 --> 5/9  Vancomycin 125 mg po once every third day 5/10 --> 5/23/24    Given multiple recurrences in context of advanced age, debility and multiple co-morbidities, administration of Bezlotoxumab (Zinplava) 500 mg over 1 hours is indicated to reduce future recurrences  Ref: Norm Wheeler M.D., Rojas Contreras M.D., Scott Griffith, Ph.D., Shiva Abraham M.D., Gabriel Falcon D.O., Pedro Weiss M.D., Talib Hampton M.D., Sena Hughse M.D., Raimundo Marte M.D., Melissa Soria M.D., Piyush Lamas M.D., Eliceo Romero M.D., Neyda Xie M.D., Jude Peters M.D., Neelima Vela B.S.M.T., Hannah Cardenas, Ph.D., Ana Quiroz, B.S., Obie Chirinos, M.S., Khushbu Flores M.D., Jean Carlos Ayala M.D., and Kristin Lopez, Ph.D., for the [employer] Investigators*    Bezlotoxumab is suggested in IDSA guidelines -  Ref: Clinical Practice Guidelines for the Management of Clostridioides difficile Infection in Adults: 2021 Update by SHEA/IDSA  Published VERNA, 6/14/2021; Clinical Infectious Diseases, [id number], https://doi.org/10.1093/verna/fabb643    Zinplava to be administered at home at end of vanco taper around 5/23/24    significance of current diarrhea unclear  - Cidff v tube feeds  4/25 routine trach change performed #8 distal XLT cuffed trach  leakage from previous and current peg sites    Suggest  replace rectal tube  add Florastor, provide Activia yogurt, consider Kaopectate  ( - if increased diarrhea and leukocytosis would increase Vanco back to q 6h)

## 2024-04-30 NOTE — PROGRESS NOTE ADULT - SUBJECTIVE AND OBJECTIVE BOX
Patient is a 74y old  Female who presents with a chief complaint of Dislodged G Tube (29 Apr 2024 07:51)      Interval Events:    REVIEW OF SYSTEMS:  [ ] Positive  [ ] All other systems negative  [ ] Unable to assess ROS because ________    Vital Signs Last 24 Hrs  T(C): 36.6 (04-30-24 @ 05:16), Max: 37.1 (04-29-24 @ 13:14)  T(F): 97.8 (04-30-24 @ 05:16), Max: 98.7 (04-29-24 @ 13:14)  HR: 79 (04-30-24 @ 05:54) (72 - 90)  BP: 123/57 (04-30-24 @ 05:16) (115/59 - 139/69)  RR: 20 (04-30-24 @ 05:16) (14 - 20)  SpO2: 100% (04-30-24 @ 05:54) (98% - 100%)    PHYSICAL EXAM:  HEENT:   [ ]Tracheostomy:  [ ]Pupils equal  [ ]No oral lesions  [ ]Abnormal    SKIN  [ ]No Rash  [ ] Abnormal  [ ] pressure    CARDIAC  [ ]Regular  [ ]Abnormal    PULMONARY  [ ]Bilateral Clear Breath Sounds  [ ]Normal Excursion  [ ]Abnormal    GI  [ ]PEG      [ ] +BS		              [ ]Soft, nondistended, nontender	  [ ]Abnormal    MUSCULOSKELETAL                                   [ ]Bedbound                 [ ]Abnormal    [ ]Ambulatory/OOB to chair                           EXTREMITIES                                         [ ]Normal  [ ]Edema                           NEUROLOGIC  [ ] Normal, non focal  [ ] Focal findings:    PSYCHIATRIC  [ ]Alert and appropriate  [ ] Sedated	 [ ]Agitated    :  Mcbride: [ ] Yes, if yes: Date of Placement:                   [  ] No    LINES: Central Lines [ ] Yes, if yes: Date of Placement                                     [  ] No    HOSPITAL MEDICATIONS:  MEDICATIONS  (STANDING):  albuterol/ipratropium for Nebulization 3 milliLiter(s) Nebulizer every 6 hours  amLODIPine   Tablet 10 milliGRAM(s) Oral <User Schedule>  artificial tears (preservative free) Ophthalmic Solution 1 Drop(s) Both EYES every 2 hours  ascorbic acid 500 milliGRAM(s) Oral daily  Biotene Dry Mouth Oral Rinse 5 milliLiter(s) Swish and Spit every 6 hours  bismuth subsalicylate Liquid 15 milliLiter(s) Oral three times a day  chlorhexidine 0.12% Liquid 15 milliLiter(s) Oral Mucosa every 12 hours  chlorhexidine 4% Liquid 1 Application(s) Topical <User Schedule>  cholecalciferol 2000 Unit(s) Oral daily  diclofenac sodium 1% Gel 4 Gram(s) Topical four times a day  erythromycin   Ointment 1 Application(s) Both EYES at bedtime  escitalopram 10 milliGRAM(s) Oral <User Schedule>  fentaNYL   Patch  25 MICROgram(s)/Hr 1 Patch Transdermal every 72 hours  gabapentin Solution 100 milliGRAM(s) Oral <User Schedule>  insulin glargine Injectable (LANTUS) 12 Unit(s) SubCutaneous <User Schedule>  insulin regular  human corrective regimen sliding scale   SubCutaneous <User Schedule>  insulin regular  human recombinant 7 Unit(s) SubCutaneous <User Schedule>  insulin regular  human recombinant 11 Unit(s) SubCutaneous <User Schedule>  insulin regular  human recombinant 9 Unit(s) SubCutaneous <User Schedule>  lactobacillus acidophilus 1 Tablet(s) Oral daily  levothyroxine 125 MICROGram(s) Oral daily  lidocaine   4% Patch 1 Patch Transdermal at bedtime  lidocaine   4% Patch 1 Patch Transdermal at bedtime  melatonin 3 milliGRAM(s) Oral at bedtime  multivitamin/minerals/iron Oral Solution (CENTRUM) 15 milliLiter(s) Oral daily  nystatin Powder 1 Application(s) Topical two times a day  pantoprazole  Injectable 40 milliGRAM(s) IV Push daily  prednisoLONE acetate 1% Suspension 1 Drop(s) Both EYES four times a day  predniSONE  Solution 5 milliGRAM(s) Oral <User Schedule>  QUEtiapine 12.5 milliGRAM(s) Oral <User Schedule>  simethicone 80 milliGRAM(s) Chew every 6 hours  surgical lubricant sterile 1 Application(s) Topical every 8 hours  vancomycin    Solution 125 milliGRAM(s) Enteral Tube daily    MEDICATIONS  (PRN):  acetaminophen   Oral Liquid .. 1000 milliGRAM(s) Enteral Tube every 6 hours PRN Temp greater or equal to 38C (100.4F), Mild Pain (1 - 3)  calamine/zinc oxide Lotion 1 Application(s) Topical daily PRN Rash and/or Itching  ondansetron Injectable 4 milliGRAM(s) IV Push every 8 hours PRN Nausea and/or Vomiting  oxyCODONE    Solution 5 milliGRAM(s) Enteral Tube every 4 hours PRN Moderate Pain (4 - 6)  sodium chloride 0.65% Nasal 1 Spray(s) Both Nostrils every 12 hours PRN Congestion      LABS:                        9.3    10.92 )-----------( 179      ( 29 Apr 2024 11:01 )             32.5     04-29    136  |  104  |  19  ----------------------------<  189<H>  3.6   |  20<L>  |  <0.30<L>    Ca    9.3      29 Apr 2024 11:01  Phos  3.6     04-29  Mg     1.9     04-29    TPro  7.1  /  Alb  3.1<L>  /  TBili  0.5  /  DBili  x   /  AST  92<H>  /  ALT  77<H>  /  AlkPhos  104  04-29      Urinalysis Basic - ( 29 Apr 2024 11:01 )    Color: x / Appearance: x / SG: x / pH: x  Gluc: 189 mg/dL / Ketone: x  / Bili: x / Urobili: x   Blood: x / Protein: x / Nitrite: x   Leuk Esterase: x / RBC: x / WBC x   Sq Epi: x / Non Sq Epi: x / Bacteria: x          CAPILLARY BLOOD GLUCOSE    MICROBIOLOGY:     RADIOLOGY:  [ ] Reviewed and interpreted by me    Mode: AC/ CMV (Assist Control/ Continuous Mandatory Ventilation)  RR (machine): 12  TV (machine): 350  FiO2: 30  PEEP: 5  ITime: 1  MAP: 8  PIP: 25   Patient is a 74y old  Female who presents with a chief complaint of Dislodged G Tube (29 Apr 2024 07:51)      Interval Events:  No acute events overnight.     REVIEW OF SYSTEMS:  [ ] Positive  [X] All other systems negative  [ ] Unable to assess ROS because ________    Vital Signs Last 24 Hrs  T(C): 36.6 (04-30-24 @ 05:16), Max: 37.1 (04-29-24 @ 13:14)  T(F): 97.8 (04-30-24 @ 05:16), Max: 98.7 (04-29-24 @ 13:14)  HR: 79 (04-30-24 @ 05:54) (72 - 90)  BP: 123/57 (04-30-24 @ 05:16) (115/59 - 139/69)  RR: 20 (04-30-24 @ 05:16) (14 - 20)  SpO2: 100% (04-30-24 @ 05:54) (98% - 100%)    PHYSICAL EXAM:  HEENT:   [X]Tracheostomy: #8 distal XLT cuffed shiley (exchanged 4/25)  [X]Pupils equal  [ ]No oral lesions  [ ]Abnormal    SKIN  [ ]No Rash  [X] Abnormal - perineal IAD  [X] pressure - sacral/BL buttocks stage 4 pressure injury     CARDIAC  [X]Regular  [ ]Abnormal    PULMONARY  [X]Bilateral Clear Breath Sounds  [ ]Normal Excursion  [ ]Abnormal    GI  [X]PEG - PEG-J site c/d/i; old PEG site w/ mild clear leakage otherwise skin intact  [X] +BS		              [X]Soft, nondistended, nontender	  [X]Abnormal - rectal tube    MUSCULOSKELETAL                                   [X]Bedbound                 [ ]Abnormal    [ ]Ambulatory/OOB to chair                           EXTREMITIES                                         [X]Normal  [ ]Edema                           NEUROLOGIC  [X] Normal, non focal  [ ] Focal findings:    PSYCHIATRIC  [X]Alert and appropriate  [ ] Sedated	 [ ]Agitated    :  Mcbride: [ ] Yes, if yes: Date of Placement:                   [X] No    LINES: Central Lines [ ] Yes, if yes: Date of Placement                                     [X] No    HOSPITAL MEDICATIONS:  MEDICATIONS  (STANDING):  albuterol/ipratropium for Nebulization 3 milliLiter(s) Nebulizer every 6 hours  amLODIPine   Tablet 10 milliGRAM(s) Oral <User Schedule>  artificial tears (preservative free) Ophthalmic Solution 1 Drop(s) Both EYES every 2 hours  ascorbic acid 500 milliGRAM(s) Oral daily  Biotene Dry Mouth Oral Rinse 5 milliLiter(s) Swish and Spit every 6 hours  bismuth subsalicylate Liquid 15 milliLiter(s) Oral three times a day  chlorhexidine 0.12% Liquid 15 milliLiter(s) Oral Mucosa every 12 hours  chlorhexidine 4% Liquid 1 Application(s) Topical <User Schedule>  cholecalciferol 2000 Unit(s) Oral daily  diclofenac sodium 1% Gel 4 Gram(s) Topical four times a day  erythromycin   Ointment 1 Application(s) Both EYES at bedtime  escitalopram 10 milliGRAM(s) Oral <User Schedule>  fentaNYL   Patch  25 MICROgram(s)/Hr 1 Patch Transdermal every 72 hours  gabapentin Solution 100 milliGRAM(s) Oral <User Schedule>  insulin glargine Injectable (LANTUS) 12 Unit(s) SubCutaneous <User Schedule>  insulin regular  human corrective regimen sliding scale   SubCutaneous <User Schedule>  insulin regular  human recombinant 7 Unit(s) SubCutaneous <User Schedule>  insulin regular  human recombinant 11 Unit(s) SubCutaneous <User Schedule>  insulin regular  human recombinant 9 Unit(s) SubCutaneous <User Schedule>  lactobacillus acidophilus 1 Tablet(s) Oral daily  levothyroxine 125 MICROGram(s) Oral daily  lidocaine   4% Patch 1 Patch Transdermal at bedtime  lidocaine   4% Patch 1 Patch Transdermal at bedtime  melatonin 3 milliGRAM(s) Oral at bedtime  multivitamin/minerals/iron Oral Solution (CENTRUM) 15 milliLiter(s) Oral daily  nystatin Powder 1 Application(s) Topical two times a day  pantoprazole  Injectable 40 milliGRAM(s) IV Push daily  prednisoLONE acetate 1% Suspension 1 Drop(s) Both EYES four times a day  predniSONE  Solution 5 milliGRAM(s) Oral <User Schedule>  QUEtiapine 12.5 milliGRAM(s) Oral <User Schedule>  simethicone 80 milliGRAM(s) Chew every 6 hours  surgical lubricant sterile 1 Application(s) Topical every 8 hours  vancomycin    Solution 125 milliGRAM(s) Enteral Tube daily    MEDICATIONS  (PRN):  acetaminophen   Oral Liquid .. 1000 milliGRAM(s) Enteral Tube every 6 hours PRN Temp greater or equal to 38C (100.4F), Mild Pain (1 - 3)  calamine/zinc oxide Lotion 1 Application(s) Topical daily PRN Rash and/or Itching  ondansetron Injectable 4 milliGRAM(s) IV Push every 8 hours PRN Nausea and/or Vomiting  oxyCODONE    Solution 5 milliGRAM(s) Enteral Tube every 4 hours PRN Moderate Pain (4 - 6)  sodium chloride 0.65% Nasal 1 Spray(s) Both Nostrils every 12 hours PRN Congestion    LABS:                        9.3    10.92 )-----------( 179      ( 29 Apr 2024 11:01 )             32.5     04-29    136  |  104  |  19  ----------------------------<  189<H>  3.6   |  20<L>  |  <0.30<L>    Ca    9.3      29 Apr 2024 11:01  Phos  3.6     04-29  Mg     1.9     04-29    TPro  7.1  /  Alb  3.1<L>  /  TBili  0.5  /  DBili  x   /  AST  92<H>  /  ALT  77<H>  /  AlkPhos  104  04-29      Urinalysis Basic - ( 29 Apr 2024 11:01 )    Color: x / Appearance: x / SG: x / pH: x  Gluc: 189 mg/dL / Ketone: x  / Bili: x / Urobili: x   Blood: x / Protein: x / Nitrite: x   Leuk Esterase: x / RBC: x / WBC x   Sq Epi: x / Non Sq Epi: x / Bacteria: x    CAPILLARY BLOOD GLUCOSE    MICROBIOLOGY:     RADIOLOGY:  [ ] Reviewed and interpreted by me    Mode: AC/ CMV (Assist Control/ Continuous Mandatory Ventilation)  RR (machine): 12  TV (machine): 350  FiO2: 30  PEEP: 5  ITime: 1  MAP: 8  PIP: 25

## 2024-04-30 NOTE — PROGRESS NOTE ADULT - PROBLEM SELECTOR PLAN 9
- Code Status: DNR  - FEES 4/23: Recc for comfort feeds with parameters ( Puree / Thin Liquids)   - Daughter/ Pts  involved in plan of care - Code Status: DNR  - FEES 4/23: Recc for comfort feeds with parameters (Puree / Thin Liquids)   - Daughter/ Pts  involved in plan of care

## 2024-04-30 NOTE — PROGRESS NOTE ADULT - PROBLEM SELECTOR PLAN 10
- Dc planning to home pending delivery of home supplies  - CM working with Home care agencies / [employer] to ensure services and supplies upon Dc - D/C home for 5/1

## 2024-04-30 NOTE — PROGRESS NOTE ADULT - ASSESSMENT
73 yo F PMH T2DM on insulin, HTN, HLD, hypothyroidism, RA on Prednisone, Fibromyalgia, cerebral aneurysm s/p repair, chronic hypercapnic respiratory failure s/p trach (vent dependent) and Chronic PEG 2022, recurrent C. diff infection (follows with Dr. Newman) , bedbound who presented from home with G tube dislodgement and redness around the site. Had CT Abdomen/Pelvis done showing dislodgement of G tube with balloon in the anterior abdominal wall with air filled track to stomach. Admitted to MICU for ventilator management and given vancomycin/meropenem empirically for treatment of abd wall cellulitis. Per family patient has had Known c diff infection for past 2-3 weeks and was being treated with Fidaxomicin, which completed inpatient.  IR team exchanged PEG on 4/3 however later in the day it remained with leakage.  IR Attending adjusted PEG at bedside on 4/4 and PEG remained with moderate amount of PEG leakage once feeds initiated at reduced rate.  GI team re-consulted as second opinion for PEG management.  On 4/9 PEG was found out of place, a emerson catheter was placed in the stoma to maintain patency.  Patient was planned for PEG revision with both Surgery and GI team involved on 4/11 however patient had fevers up to 102F and procedure was cancelled for infectious work-up.  An 18 Fr PEG tube placed at bedside on 4/12 (original PEG size was 20Fr) as stoma site appears to have closed. Antibiotics were switched from Meropenem to Cefepime, completed 4/14. Patient now s/p PEG-J and closure of gastric fistula with GI and surgery in endo suite on 4/16.  New stoma site with no leakage, pt has been tolerating feeds.  A FEES was performed on 4/23 with recs for comfort feeds with parameters as per SLP.  Pt to continue vanco taper until 05/23 for c diff treatment with outpatient follow up with Dr. Newman.  Continuing to pressure support as tolerated.  Pt to return home.      4/29:  No acute events throughout the day.  Remains medically stable for discharge. 75 yo F PMH T2DM on insulin, HTN, HLD, hypothyroidism, RA on Prednisone, Fibromyalgia, cerebral aneurysm s/p repair, chronic hypercapnic respiratory failure s/p trach (vent dependent) and Chronic PEG 2022, recurrent C. diff infection (follows with Dr. Newman) , bedbound who presented from home with G tube dislodgement and redness around the site. Had CT Abdomen/Pelvis done showing dislodgement of G tube with balloon in the anterior abdominal wall with air filled track to stomach. Admitted to MICU for ventilator management and given vancomycin/meropenem empirically for treatment of abd wall cellulitis. Per family patient has had Known c diff infection for past 2-3 weeks and was being treated with Fidaxomicin, which completed inpatient.  IR team exchanged PEG on 4/3 however later in the day it remained with leakage.  IR Attending adjusted PEG at bedside on 4/4 and PEG remained with moderate amount of PEG leakage once feeds initiated at reduced rate.  GI team re-consulted as second opinion for PEG management.  On 4/9 PEG was found out of place, a emerson catheter was placed in the stoma to maintain patency.  Patient was planned for PEG revision with both Surgery and GI team involved on 4/11 however patient had fevers up to 102F and procedure was cancelled for infectious work-up.  An 18 Fr PEG tube placed at bedside on 4/12 (original PEG size was 20Fr) as stoma site appears to have closed. Antibiotics were switched from Meropenem to Cefepime, completed 4/14. Patient now s/p PEG-J and closure of gastric fistula with GI and surgery in endo suite on 4/16.  New stoma site with no leakage, pt has been tolerating feeds.  Old stoma site skin intact, continues to have mild/moderate amounts of clear drainage, surgery has been following and have suggested no other recommendations.  A FEES was performed on 4/23 with recs for comfort feeds with parameters as per SLP.  Pt to continue vanco taper until 05/23 for c diff treatment with outpatient follow up with Dr. Newman.  PST as tolerated.  Pt medically stable to return home.    4/30: 75 yo F PMH T2DM on insulin, HTN, HLD, hypothyroidism, RA on Prednisone, Fibromyalgia, cerebral aneurysm s/p repair, chronic hypercapnic respiratory failure s/p trach (vent dependent) and Chronic PEG 2022, recurrent C. diff infection (follows with Dr. Newman) , bedbound who presented from home with G tube dislodgement and redness around the site. Had CT Abdomen/Pelvis done showing dislodgement of G tube with balloon in the anterior abdominal wall with air filled track to stomach. Admitted to MICU for ventilator management and given vancomycin/meropenem empirically for treatment of abd wall cellulitis. Per family patient has had Known c diff infection for past 2-3 weeks and was being treated with Fidaxomicin, which completed inpatient.  IR team exchanged PEG on 4/3 however later in the day it remained with leakage.  IR Attending adjusted PEG at bedside on 4/4 and PEG remained with moderate amount of PEG leakage once feeds initiated at reduced rate.  GI team re-consulted as second opinion for PEG management.  On 4/9 PEG was found out of place, a emerson catheter was placed in the stoma to maintain patency.  Patient was planned for PEG revision with both Surgery and GI team involved on 4/11 however patient had fevers up to 102F and procedure was cancelled for infectious work-up.  An 18 Fr PEG tube placed at bedside on 4/12 (original PEG size was 20Fr) as stoma site appears to have closed. Antibiotics were switched from Meropenem to Cefepime, completed 4/14. Patient now s/p PEG-J and closure of gastric fistula with GI and surgery in endo suite on 4/16.  New stoma site with no leakage, pt has been tolerating feeds.  Old stoma site skin intact, continues to have mild/moderate amounts of clear drainage, surgery has been following and have suggested no other recommendations.  A FEES was performed on 4/23 with recs for comfort feeds with parameters as per SLP.  Pt to continue vanco taper until 05/23 for c diff treatment with outpatient follow up with Dr. Newman.  PST as tolerated.  Pt medically stable to return home.    4/30:  Pending D/C home tomorrow.  Surgery called to evaluate old stoma site/leakage.  ENT called for blood tinged secretions to evaluate trach since exchange Friday.  Endocrine called for d/c recommendations.

## 2024-04-30 NOTE — PROGRESS NOTE ADULT - SUBJECTIVE AND OBJECTIVE BOX
Follow Up:  c diff    Interval History/ROS:  fatigued, with diarrhea    Allergies  pineapple (Unknown)  Tagamet (Unknown)  heparin (Unknown)  walnut (Unknown)  metronidazole (Rash)  penicillin (Unknown)  Lyrica (Unknown)  meropenem (Rash)  Pec[place]ressa reid, [place] (Unknown)    ANTIMICROBIALS:  vancomycin    Solution 125 daily      OTHER MEDS:  MEDICATIONS  (STANDING):  acetaminophen   Oral Liquid .. 1000 every 6 hours PRN  albuterol/ipratropium for Nebulization 3 every 6 hours  amLODIPine   Tablet 10 <User Schedule>  bismuth subsalicylate Liquid 15 <User Schedule>  escitalopram 10 <User Schedule>  fentaNYL   Patch  25 MICROgram(s)/Hr 1 every 72 hours  gabapentin Solution 100 <User Schedule>  insulin glargine Injectable (LANTUS) 12 <User Schedule>  insulin regular  human corrective regimen sliding scale  <User Schedule>  insulin regular  human recombinant 7 <User Schedule>  insulin regular  human recombinant 9 <User Schedule>  insulin regular  human recombinant 11 <User Schedule>  levothyroxine 125 daily  melatonin 3 at bedtime  ondansetron Injectable 4 every 8 hours PRN  oxyCODONE    Solution 5 every 4 hours PRN  pantoprazole   Suspension 40 daily  predniSONE  Solution 5 <User Schedule>  QUEtiapine 12.5 <User Schedule>  simethicone 80 every 6 hours      Vital Signs Last 24 Hrs  T(C): 37 (30 Apr 2024 12:00), Max: 37.1 (29 Apr 2024 18:22)  T(F): 98.6 (30 Apr 2024 12:00), Max: 98.7 (29 Apr 2024 18:22)  HR: 78 (30 Apr 2024 16:00) (72 - 90)  BP: 132/72 (30 Apr 2024 12:00) (115/59 - 139/69)  BP(mean): --  RR: 23 (30 Apr 2024 15:59) (20 - 25)  SpO2: 100% (30 Apr 2024 16:00) (98% - 100%)    Parameters below as of 30 Apr 2024 15:59  Patient On (Oxygen Delivery Method): ventilator        PHYSICAL EXAM:  General:  NAD, Non-toxic  Neurology: A&Ox3, nonfocal  Respiratory: Clear to auscultation bilaterally  CV: RRR, S1S2, no murmurs, rubs or gallops  Abdominal: distended, rectal tube in place  Extremities: generalized moderate edema  Line Sites: Clear  Skin: No rash                          8.8    9.99  )-----------( 190      ( 30 Apr 2024 10:25 )             28.7       04-30    133<L>  |  99  |  15  ----------------------------<  101<H>  3.7   |  20<L>  |  <0.30<L>    Ca    9.0      30 Apr 2024 10:25  Phos  2.6     04-30  Mg     1.8     04-30    TPro  7.1  /  Alb  3.1<L>  /  TBili  0.5  /  DBili  x   /  AST  92<H>  /  ALT  77<H>  /  AlkPhos  104  04-29      MICROBIOLOGY:  .Blood Blood-Peripheral  04-15-24   No growth at 5 days  --  --      .Blood Blood-Peripheral  04-15-24   No growth at 5 days  --  --      Catheterized Catheterized  04-12-24   No growth  --  --      .Blood Blood-Peripheral  04-12-24   No growth at 5 days  --  --      .Blood Blood-Peripheral  04-11-24   No growth at 5 days  --  --      .Blood Blood-Peripheral  04-09-24   No growth at 5 days  --  --      .Sputum Sputum  04-07-24   Mixed gram negative rods Culture includes  Moderate Stenotrophomonas maltophilia  Few Pseudomonas aeruginosa (Carbapenem Resistant)  --  Stenotrophomonas maltophilia  Pseudomonas aeruginosa (Carbapenem Resistant)      .Blood Blood-Peripheral  04-07-24   No growth at 5 days  --  Blood Culture PCR  Serratia marcescens      .Sputum Sputum  04-05-24   Numerous Pseudomonas aeruginosa  Normal Respiratory Shanda present  --  Pseudomonas aeruginosa      .Blood Blood-Peripheral  04-04-24   No growth at 5 days  --  --      Clean Catch Clean Catch (Midstream)  04-03-24   <10,000 CFU/mL Normal Urogenital Shanda  --  --      .Blood Blood-Peripheral  04-02-24   No growth at 5 days  --  --    RADIOLOGY:    Zion Newman MD; Division of Infectious Disease; Pager: [phone]; nights and weekends: [phone]

## 2024-04-30 NOTE — CHART NOTE - NSCHARTNOTESELECT_GEN_ALL_CORE
Blood transfusion/Event Note
Endocrinology/Event Note
Event Note
Nutrition Services
Nutrition Services
Rheumatology/Event Note
Endocrinology/Event Note
Event Note
IR
Interventional Radiology
Nutrition Services
Off Service Note
PEG feeds/Event Note
Post Op Check
Tracheostomy Tube Exchange/Event Note
Transfer Note
endocrine/Event Note

## 2024-04-30 NOTE — PROGRESS NOTE ADULT - ASSESSMENT
73 y/o F with PMHx of T2DM on insulin, HTN, HLD, hypothyroidism, RA on Prednisone, Fibromyalgia, cerebral aneurysm s/p repair, acute hypercapnic respiratory failure s/p trach (vent dependent) and PEG (10/22), bedbound presented from home with complaints of possible G tube dislodgement and redness around the site, now s/p replacement. ENT initially consulted for tracheostomy tube exchange, routine trach change performed on 4/25, #8 distal XLT cuffed trach in place. ENT reconsulted today for blood noted in tracheal secretions. Tracheoscopy did not show any signs of active bleeding.  73 y/o F with PMHx of T2DM on insulin, HTN, HLD, hypothyroidism, RA on Prednisone, Fibromyalgia, cerebral aneurysm s/p repair, acute hypercapnic respiratory failure s/p trach (vent dependent) and PEG (10/22), bedbound presented from home with complaints of possible G tube dislodgement and redness around the site, now s/p replacement. ENT initially consulted for tracheostomy tube exchange, routine trach change performed on 4/25, #8 distal XLT cuffed trach in place. ENT reconsulted today for blood noted in tracheal secretions. Tracheoscopy did not show any signs of active bleeding. S/p silver nitrate cautery of granulation tissue located on the superior aspect of stoma.

## 2024-04-30 NOTE — PROGRESS NOTE ADULT - SUBJECTIVE AND OBJECTIVE BOX
SURGERY DAILY PROGRESS NOTE:     SUBJECTIVE/ROS: Patient seen and examined, tolerated PEG feeds. of note, some fluid leakage around old G tube site and around current PEG-J tube site.       MEDICATIONS  (STANDING):  albuterol/ipratropium for Nebulization 3 milliLiter(s) Nebulizer every 6 hours  amLODIPine   Tablet 10 milliGRAM(s) Oral <User Schedule>  artificial tears (preservative free) Ophthalmic Solution 1 Drop(s) Both EYES every 2 hours  ascorbic acid 500 milliGRAM(s) Oral daily  Biotene Dry Mouth Oral Rinse 5 milliLiter(s) Swish and Spit every 6 hours  bismuth subsalicylate Liquid 15 milliLiter(s) Oral <User Schedule>  chlorhexidine 0.12% Liquid 15 milliLiter(s) Oral Mucosa every 12 hours  chlorhexidine 4% Liquid 1 Application(s) Topical <User Schedule>  cholecalciferol 2000 Unit(s) Oral daily  diclofenac sodium 1% Gel 4 Gram(s) Topical four times a day  erythromycin   Ointment 1 Application(s) Both EYES at bedtime  escitalopram 10 milliGRAM(s) Oral <User Schedule>  fentaNYL   Patch  25 MICROgram(s)/Hr 1 Patch Transdermal every 72 hours  gabapentin Solution 100 milliGRAM(s) Oral <User Schedule>  insulin glargine Injectable (LANTUS) 12 Unit(s) SubCutaneous <User Schedule>  insulin regular  human corrective regimen sliding scale   SubCutaneous <User Schedule>  insulin regular  human recombinant 7 Unit(s) SubCutaneous <User Schedule>  insulin regular  human recombinant 11 Unit(s) SubCutaneous <User Schedule>  insulin regular  human recombinant 9 Unit(s) SubCutaneous <User Schedule>  lactobacillus acidophilus 1 Tablet(s) Oral daily  levothyroxine 125 MICROGram(s) Oral daily  lidocaine   4% Patch 1 Patch Transdermal at bedtime  lidocaine   4% Patch 1 Patch Transdermal at bedtime  melatonin 3 milliGRAM(s) Oral at bedtime  multivitamin/minerals/iron Oral Solution (CENTRUM) 15 milliLiter(s) Oral daily  nystatin Ointment 1 Application(s) Topical every 12 hours  nystatin Powder 1 Application(s) Topical two times a day  pantoprazole   Suspension 40 milliGRAM(s) Oral daily  prednisoLONE acetate 1% Suspension 1 Drop(s) Both EYES four times a day  predniSONE  Solution 5 milliGRAM(s) Oral <User Schedule>  QUEtiapine 12.5 milliGRAM(s) Oral <User Schedule>  simethicone 80 milliGRAM(s) Chew every 6 hours  surgical lubricant sterile 1 Application(s) Topical every 8 hours  vancomycin    Solution 125 milliGRAM(s) Enteral Tube daily    MEDICATIONS  (PRN):  acetaminophen   Oral Liquid .. 1000 milliGRAM(s) Enteral Tube every 6 hours PRN Temp greater or equal to 38C (100.4F), Mild Pain (1 - 3)  calamine/zinc oxide Lotion 1 Application(s) Topical daily PRN Rash and/or Itching  ondansetron Injectable 4 milliGRAM(s) IV Push every 8 hours PRN Nausea and/or Vomiting  oxyCODONE    Solution 5 milliGRAM(s) Enteral Tube every 4 hours PRN Moderate Pain (4 - 6)  sodium chloride 0.65% Nasal 1 Spray(s) Both Nostrils every 12 hours PRN Congestion      OBJECTIVE:    Vital Signs Last 24 Hrs  T(C): 37 (2024 12:00), Max: 37.1 (2024 18:22)  T(F): 98.6 (2024 12:00), Max: 98.7 (2024 18:22)  HR: 78 (2024 16:00) (72 - 90)  BP: 132/72 (2024 12:00) (115/59 - 139/69)  BP(mean): --  RR: 23 (2024 15:59) (20 - 25)  SpO2: 100% (2024 16:00) (98% - 100%)    Parameters below as of 2024 15:59  Patient On (Oxygen Delivery Method): ventilator            I&O's Detail    2024 07:01  -  2024 07:00  --------------------------------------------------------  IN:    Enteral Tube Flush: 420 mL    Free Water: 360 mL    Miscellaneous Tube Feedin mL  Total IN: 1770 mL    OUT:    Incontinent per Collection Bag (mL): 400 mL    Rectal Tube (mL): 750 mL  Total OUT: 1150 mL    Total NET: 620 mL      2024 07:01  -  2024 16:43  --------------------------------------------------------  IN:    Miscellaneous Tube Feedin mL  Total IN: 550 mL    OUT:    Incontinent per Collection Bag (mL): 450 mL    Rectal Tube (mL): 300 mL  Total OUT: 750 mL    Total NET: -200 mL          Daily     Daily     LABS:                        8.8    9.99  )-----------( 190      ( 2024 10:25 )             28.7     04-30    133<L>  |  99  |  15  ----------------------------<  101<H>  3.7   |  20<L>  |  <0.30<L>    Ca    9.0      2024 10:25  Phos  2.6     04-30  Mg     1.8     30    TPro  7.1  /  Alb  3.1<L>  /  TBili  0.5  /  DBili  x   /  AST  92<H>  /  ALT  77<H>  /  AlkPhos  104  04-29      Urinalysis Basic - ( 2024 10:25 )    Color: x / Appearance: x / SG: x / pH: x  Gluc: 101 mg/dL / Ketone: x  / Bili: x / Urobili: x   Blood: x / Protein: x / Nitrite: x   Leuk Esterase: x / RBC: x / WBC x   Sq Epi: x / Non Sq Epi: x / Bacteria: x                PHYSICAL EXAM:    General: No acute distress, resting comfortably in bed.  Abdomen: soft, nondistended, non tender; GJ tube 5cm at the bumper, dressing clean/dry; prior PEG tube site with small amount of clear mucous drainage on dressing

## 2024-04-30 NOTE — PROGRESS NOTE ADULT - NS ATTEND AMEND GEN_ALL_CORE FT
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     N: Awake and alert at the time of my exam. Interactive and following. Continue delirium precautions, routine RCU care.   CV: Has been normotensive since admission.   P: Chronic respiratory failure s/p tracheostomy. She continues on full vent support with high support PSV trials as she tolerates. Will continue oral care and suctioning as needed per RCU protocol. She has duonebs ordered for every 6 hours.   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 She is s/p EGD assisted sutured closer of gastrocutaneous fistula and GJ placement (4/16) with surgery. She is tolerating tube feeds without issue. Her daughter is requesting surgical evaluation of granulation tissue prior to discharge by surgery. Will ask them to come back.   She has had issues with ongoing diarrhea in the setting of C.Diff and tube feeding. She has a rectal tube in place. We have added banatrol, and will add Bismuth as per ID recs to help form stool.  s/p FEEST, strict recommendations for pleasure feeding. The family has requested additional guidance from dietary in terms of supplements and feeding consistency recommendations. We will ensure they have communicated with family prior to discharge.  R: Creatinine within normal range, making good urine. She has no emerson in place, using external collection device. Will continue to monitor clinically   ID: We continue to monitor her off antibiotics (s/p full course for Serratia bacteremia) other than PO vancomycin taper for recurrent episode C.diff. Plan for dose of bezlotoxumab as an outpatient after discharge. Dr. Newman is following and has been communicating closely with family.  Endo: She has a history of hypothyroidism, and diabetes. We appreciate endocrinology input in this challenging case. We have send thyroid labs, which are pending at the time of this note.  Heme: The patient has a history of chronic anemia. [place] saw the patient during the admission and provided the following recommendation: Transfuse for hg < 7.0 and platelets < 10k, < 20k if febrile and < 50k if bleeding. However, primary team can consider transfusion goal hg < 8.0 if patient has symptoms of anemia, or during sepsis.      Dispo planning underway. Await additional supplies and services to be reinstated. Updated daughter Marysol over the phone and  at bedside. 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     N: Awake and alert at the time of my exam. Interactive and following. Continue delirium precautions, routine RCU care.   CV: Has been normotensive since admission.   P: Chronic respiratory failure s/p tracheostomy. She continues on full vent support with high support PSV trials as she tolerates. Will continue oral care and suctioning as needed per RCU protocol. She has duonebs ordered for every 6 hours.   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 She is s/p EGD assisted sutured closer of gastrocutaneous fistula and GJ placement (4/16) with surgery. She is tolerating tube feeds without issue. Her daughter is requesting surgical evaluation of granulation tissue prior to discharge by surgery. She has continued leakage from her wound. Will ask surgery to come back and provide recommendations prior to discharge.   She has had issues with ongoing diarrhea in the setting of C.Diff and tube feeding. She has a rectal tube in place. We have added banatrol, and will add Bismuth as per ID recs to help form stool. Her stool is more formed. Will plan to DC rectal tube.  s/p FEEST, strict recommendations for pleasure feeding. The family has requested additional guidance from dietary in terms of supplements and feeding consistency recommendations. We will ensure they have communicated with family prior to discharge.  R: Creatinine within normal range, making good urine. She has no emerson in place, using external collection device. Will continue to monitor clinically   ID: We continue to monitor her off antibiotics (s/p full course for Serratia bacteremia) other than PO vancomycin taper for recurrent episode C.diff. Plan for dose of bezlotoxumab as an outpatient after discharge. Dr. Newman is following and has been communicating closely with family.  Endo: She has a history of hypothyroidism, and diabetes. We appreciate endocrinology input.  Heme: The patient has a history of chronic anemia. [place] saw the patient during the admission and provided the following recommendation: Transfuse for hg < 7.0 and platelets < 10k, < 20k if febrile and < 50k if bleeding. However, primary team can consider transfusion goal hg < 8.0 if patient has symptoms of anemia, or during sepsis.  MSK: I evaluated her sacral wound on exam today. Based on evaluations previously, her sacral wound appears to be overall improving. SHe has wound care set up for post-discharge continued evaluation and management.     Dispo planning underway. Await additional supplies and services to be reinstated. Updated daughter Marysol over the phone. She is asking for additional documentation for 24 hour RN care in addition to LPN. I have read through the criteria and confirmed with case management, social work and the head of social work for the PDN care. Although certainly would benefit from 24 hour care, she does not specifically qualify for RN assessments at this time. I explained this to Marysol over the phone. I would be happy to provide additional documentation as appropriate to help assist Ms. Woods get the care she needs.

## 2024-04-30 NOTE — PROGRESS NOTE ADULT - PROBLEM SELECTOR PLAN 7
- Possibly reactive in setting of antibiotics/active infection  - History of AOCD  - Patient with slightly elevated INR; S/p Vitk 10mg QD X 3 Days ( Ended 4/19)   - Transfuse for hg < 7.0 and platelets < 10k, < 20k if febrile and < 50k if bleeding ( Heme Reccs) - Possibly reactive in setting of antibiotics/active infection  - History of AOCD  - Patient with slightly elevated INR; S/p Vitk 10mg QD X 3 Days (Ended 4/19): INR stable   - Transfuse for hg < 7.0 and platelets < 10k, < 20k if febrile and < 50k if bleeding (Heme Reccs)

## 2024-04-30 NOTE — CHART NOTE - NSCHARTNOTEFT_GEN_A_CORE
Age: 74y    Gender: Female    POCT Blood Glucose:  193 mg/dL (04-30-24 @ 11:42)  124 mg/dL (04-30-24 @ 06:04)  181 mg/dL (04-29-24 @ 23:32)  235 mg/dL (04-29-24 @ 17:46)      eMAR:  insulin glargine Injectable (LANTUS)   12 Unit(s) SubCutaneous (04-29-24 @ 18:49)    insulin regular  human corrective regimen sliding scale   2 Unit(s) SubCutaneous (04-30-24 @ 11:46)   4 Unit(s) SubCutaneous (04-29-24 @ 18:28)    insulin regular  human recombinant   7 Unit(s) SubCutaneous (04-29-24 @ 18:28)    insulin regular  human recombinant   9 Unit(s) SubCutaneous (04-30-24 @ 11:45)    insulin regular  human recombinant   11 Unit(s) SubCutaneous (04-30-24 @ 06:13)    levothyroxine   125 MICROGram(s) Oral (04-30-24 @ 06:12)    predniSONE  Solution   5 milliGRAM(s) Oral (04-30-24 @ 09:53)     POC glucose, insulin requirements, lab values reviewed.     #Uncontrolled Type 2 Diabetes Mellitus   #Hyperglycemia on Tube feeds  - Follows with: Dr Rupali Ruth, endocrinology.   - A1C with Estimated Average Glucose Result: 6.2 % (04-04-24)  - Home regimen: Patient is on TF from 6AM-6PM. Takes Lantus 15 units qhs and regular insulin 10 units at 6AM, 12 units at 12PM, and 6 units at 6PM. She is on prednisone 5mg daily for RA  - eGFR: 113 mL/min/1.73m2 (04-19-24)  - Weight (kg): 48.1 (04-16-24)  - current tube feeds: [place] 1.5 at 55 cc/hr 6AM-midnight. Will go home on same formulation.    INPATIENT PLAN:  - continue Lantus 12 units at 6PM  -continue Regular insulin (longer acting)  11 units at 0600, continue Regular insulin to 9 units at 1200 continue 7 units at 1800,  NO DOSE AT MN !(HOLD if tube feeds are stopped).  - C/w mod dose admelog correction scale to moderate dose Regular insulin scale q6h  - Please check FSG q6h   - Inpatient glucose goals: 100-180      DISCHARGE PLANNING:  - will be discharged on [place] 1.5 at 55 cc/hr 6AM-midnight  - Continue lantus 12 units at 1800  - Continue regular insulin 11 units at 6AM, 9 units at 12PM,  7 units at 6PM,   - Continue to check blood glucose 4 x daily  - Please make sure patient has all needed supplies: glucometer, test strips, lancets, alcohol swabs, pen needles.   - Followup with Dr Ruth: [facility]: [address] [place] [address]. [address]. [place] [address]. Tel: [phone]    #Secondary adrenal insufficiency  - 2/2 long term use of prednisone  - c/w prednisone 5mg daily    #Hypothyroidism  - home regimen LT4 125 mcg daily  - currently on LT4 87.5 mcg IV daily  - would resume home dose of LT5 125 mcg PO daily. Please administer in the AM on an empty stomach, 1 hour before food or other medications, and 4 hours before any PPI, calcium, or iron supplements. Can give at 5AM since tube feeds start at 6AM.    - check TSH and free T4    #Hypertension  - Goal BP <130/80  - on amlodipine  - Management as per primary team  - check urine microalbumin level as outpatient    #Hyperlipidemia  - LDL goal <70  - Last LDL: 63 mg/dL (04-04-24)  - consider mod dose statin if no contraindication  - check lipid panel as outpatient on a yearly basis    Contact via [employer] Teams during business hours  To reach covering provider access AMION via sunrise tools  For Urgent matters/after-hours/weekends/holidays please page endocrine fellow on call   For nonurgent matters please email [email or web address]    Please note that this patient may be followed by different provider tomorrow.  Notify endocrine 24 hours prior to discharge for final recommendations

## 2024-04-30 NOTE — PROGRESS NOTE ADULT - SUBJECTIVE AND OBJECTIVE BOX
ENT ISSUE/POD: Bloody tracheal secretions    HPI: 75 y/o F with PMHx of T2DM on insulin, HTN, HLD, hypothyroidism, RA on Prednisone, Fibromyalgia, cerebral aneurysm s/p repair, acute hypercapnic respiratory failure s/p trach (vent dependent) and PEG (10/22), bedbound presented from home with complaints of possible G tube dislodgement and redness around the site, now s/p replacement. ENT initially consulted for tracheostomy tube exchange, routine trach change performed on 4/25, #8 distal XLT cuffed trach in place. ENT reconsulted today for blood noted in tracheal secretions.         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      H/O tracheostomy      PEG (percutaneous endoscopic gastrostomy) status        Allergies    pineapple (Unknown)  Tagamet (Unknown)  heparin (Unknown)  walnut (Unknown)  metronidazole (Rash)  penicillin (Unknown)  Lyrica (Unknown)  meropenem (Rash)  Pecan, Filbert, Hazelnut (Unknown)    Intolerances      MEDICATIONS  (STANDING):  albuterol/ipratropium for Nebulization 3 milliLiter(s) Nebulizer every 6 hours  amLODIPine   Tablet 10 milliGRAM(s) Oral <User Schedule>  artificial tears (preservative free) Ophthalmic Solution 1 Drop(s) Both EYES every 2 hours  ascorbic acid 500 milliGRAM(s) Oral daily  Biotene Dry Mouth Oral Rinse 5 milliLiter(s) Swish and Spit every 6 hours  bismuth subsalicylate Liquid 15 milliLiter(s) Oral <User Schedule>  chlorhexidine 0.12% Liquid 15 milliLiter(s) Oral Mucosa every 12 hours  chlorhexidine 4% Liquid 1 Application(s) Topical <User Schedule>  cholecalciferol 2000 Unit(s) Oral daily  diclofenac sodium 1% Gel 4 Gram(s) Topical four times a day  erythromycin   Ointment 1 Application(s) Both EYES at bedtime  escitalopram 10 milliGRAM(s) Oral <User Schedule>  fentaNYL   Patch  25 MICROgram(s)/Hr 1 Patch Transdermal every 72 hours  gabapentin Solution 100 milliGRAM(s) Oral <User Schedule>  insulin glargine Injectable (LANTUS) 12 Unit(s) SubCutaneous <User Schedule>  insulin regular  human corrective regimen sliding scale   SubCutaneous <User Schedule>  insulin regular  human recombinant 11 Unit(s) SubCutaneous <User Schedule>  insulin regular  human recombinant 9 Unit(s) SubCutaneous <User Schedule>  insulin regular  human recombinant 7 Unit(s) SubCutaneous <User Schedule>  lactobacillus acidophilus 1 Tablet(s) Oral daily  levothyroxine 125 MICROGram(s) Oral daily  lidocaine   4% Patch 1 Patch Transdermal at bedtime  lidocaine   4% Patch 1 Patch Transdermal at bedtime  melatonin 3 milliGRAM(s) Oral at bedtime  multivitamin/minerals/iron Oral Solution (CENTRUM) 15 milliLiter(s) Oral daily  nystatin Powder 1 Application(s) Topical two times a day  pantoprazole   Suspension 40 milliGRAM(s) Oral daily  prednisoLONE acetate 1% Suspension 1 Drop(s) Both EYES four times a day  predniSONE  Solution 5 milliGRAM(s) Oral <User Schedule>  QUEtiapine 12.5 milliGRAM(s) Oral <User Schedule>  simethicone 80 milliGRAM(s) Chew every 6 hours  surgical lubricant sterile 1 Application(s) Topical every 8 hours  vancomycin    Solution 125 milliGRAM(s) Enteral Tube daily    MEDICATIONS  (PRN):  acetaminophen   Oral Liquid .. 1000 milliGRAM(s) Enteral Tube every 6 hours PRN Temp greater or equal to 38C (100.4F), Mild Pain (1 - 3)  calamine/zinc oxide Lotion 1 Application(s) Topical daily PRN Rash and/or Itching  ondansetron Injectable 4 milliGRAM(s) IV Push every 8 hours PRN Nausea and/or Vomiting  oxyCODONE    Solution 5 milliGRAM(s) Enteral Tube every 4 hours PRN Moderate Pain (4 - 6)  sodium chloride 0.65% Nasal 1 Spray(s) Both Nostrils every 12 hours PRN Congestion      Social History: see consult    Family history: see consult    ROS:   Unable to obtain further history due to pts clinical condition.       Vital Signs Last 24 Hrs  T(C): 37 (30 Apr 2024 12:00), Max: 37.1 (29 Apr 2024 18:22)  T(F): 98.6 (30 Apr 2024 12:00), Max: 98.7 (29 Apr 2024 18:22)  HR: 86 (30 Apr 2024 12:00) (72 - 90)  BP: 132/72 (30 Apr 2024 12:00) (115/59 - 139/69)  BP(mean): --  RR: 25 (30 Apr 2024 09:19) (15 - 25)  SpO2: 100% (30 Apr 2024 12:00) (98% - 100%)    Parameters below as of 30 Apr 2024 12:00  Patient On (Oxygen Delivery Method): ventilator                              8.8    9.99  )-----------( 190      ( 30 Apr 2024 10:25 )             28.7    04-30    133<L>  |  99  |  15  ----------------------------<  101<H>  3.7   |  20<L>  |  <0.30<L>    Ca    9.0      30 Apr 2024 10:25  Phos  2.6     04-30  Mg     1.8     04-30    TPro  7.1  /  Alb  3.1<L>  /  TBili  0.5  /  DBili  x   /  AST  92<H>  /  ALT  77<H>  /  AlkPhos  104  04-29       PHYSICAL EXAM:  Gen: NAD  Skin: No rashes, bruises, or lesions  Head: Normocephalic, Atraumatic  Face: no edema, erythema, or fluctuance. Parotid glands soft without mass  Eyes: no scleral injection  Nose: Nares bilaterally patent, no discharge  Mouth: No Stridor / Drooling / Trismus.  Mucosa moist, tongue/uvula midline, oropharynx clear  Neck: #8 distal XLT trach in place, cuff inflated, secured with velcro tie. Flat, supple, no lymphadenopathy, trachea midline, no masses  Lymphatic: No lymphadenopathy  Resp: on ventilator  Neuro: facial nerve intact, no facial droop      Procedure Note  Procedure: Tracheoscopy  Indication: bloody tracheal secretions    Bedside tracheoscopy performed, nick visualized, no purulence, no erythema, no signs of active bleeding. Pt tolerated the procedure well without complications.   ENT ISSUE/POD: Bloody tracheal secretions    HPI: 75 y/o F with PMHx of T2DM on insulin, HTN, HLD, hypothyroidism, RA on Prednisone, Fibromyalgia, cerebral aneurysm s/p repair, acute hypercapnic respiratory failure s/p trach (vent dependent) and PEG (10/22), bedbound presented from home with complaints of possible G tube dislodgement and redness around the site, now s/p replacement. ENT initially consulted for tracheostomy tube exchange, routine trach change performed on 4/25, #8 distal XLT cuffed trach in place. ENT reconsulted today for blood noted in tracheal secretions.         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      H/O tracheostomy      PEG (percutaneous endoscopic gastrostomy) status        Allergies    pineapple (Unknown)  Tagamet (Unknown)  heparin (Unknown)  walnut (Unknown)  metronidazole (Rash)  penicillin (Unknown)  Lyrica (Unknown)  meropenem (Rash)  Pecan, Filbert, Hazelnut (Unknown)    Intolerances      MEDICATIONS  (STANDING):  albuterol/ipratropium for Nebulization 3 milliLiter(s) Nebulizer every 6 hours  amLODIPine   Tablet 10 milliGRAM(s) Oral <User Schedule>  artificial tears (preservative free) Ophthalmic Solution 1 Drop(s) Both EYES every 2 hours  ascorbic acid 500 milliGRAM(s) Oral daily  Biotene Dry Mouth Oral Rinse 5 milliLiter(s) Swish and Spit every 6 hours  bismuth subsalicylate Liquid 15 milliLiter(s) Oral <User Schedule>  chlorhexidine 0.12% Liquid 15 milliLiter(s) Oral Mucosa every 12 hours  chlorhexidine 4% Liquid 1 Application(s) Topical <User Schedule>  cholecalciferol 2000 Unit(s) Oral daily  diclofenac sodium 1% Gel 4 Gram(s) Topical four times a day  erythromycin   Ointment 1 Application(s) Both EYES at bedtime  escitalopram 10 milliGRAM(s) Oral <User Schedule>  fentaNYL   Patch  25 MICROgram(s)/Hr 1 Patch Transdermal every 72 hours  gabapentin Solution 100 milliGRAM(s) Oral <User Schedule>  insulin glargine Injectable (LANTUS) 12 Unit(s) SubCutaneous <User Schedule>  insulin regular  human corrective regimen sliding scale   SubCutaneous <User Schedule>  insulin regular  human recombinant 11 Unit(s) SubCutaneous <User Schedule>  insulin regular  human recombinant 9 Unit(s) SubCutaneous <User Schedule>  insulin regular  human recombinant 7 Unit(s) SubCutaneous <User Schedule>  lactobacillus acidophilus 1 Tablet(s) Oral daily  levothyroxine 125 MICROGram(s) Oral daily  lidocaine   4% Patch 1 Patch Transdermal at bedtime  lidocaine   4% Patch 1 Patch Transdermal at bedtime  melatonin 3 milliGRAM(s) Oral at bedtime  multivitamin/minerals/iron Oral Solution (CENTRUM) 15 milliLiter(s) Oral daily  nystatin Powder 1 Application(s) Topical two times a day  pantoprazole   Suspension 40 milliGRAM(s) Oral daily  prednisoLONE acetate 1% Suspension 1 Drop(s) Both EYES four times a day  predniSONE  Solution 5 milliGRAM(s) Oral <User Schedule>  QUEtiapine 12.5 milliGRAM(s) Oral <User Schedule>  simethicone 80 milliGRAM(s) Chew every 6 hours  surgical lubricant sterile 1 Application(s) Topical every 8 hours  vancomycin    Solution 125 milliGRAM(s) Enteral Tube daily    MEDICATIONS  (PRN):  acetaminophen   Oral Liquid .. 1000 milliGRAM(s) Enteral Tube every 6 hours PRN Temp greater or equal to 38C (100.4F), Mild Pain (1 - 3)  calamine/zinc oxide Lotion 1 Application(s) Topical daily PRN Rash and/or Itching  ondansetron Injectable 4 milliGRAM(s) IV Push every 8 hours PRN Nausea and/or Vomiting  oxyCODONE    Solution 5 milliGRAM(s) Enteral Tube every 4 hours PRN Moderate Pain (4 - 6)  sodium chloride 0.65% Nasal 1 Spray(s) Both Nostrils every 12 hours PRN Congestion      Social History: see consult    Family history: see consult    ROS:   Unable to obtain further history due to pts clinical condition.       Vital Signs Last 24 Hrs  T(C): 37 (30 Apr 2024 12:00), Max: 37.1 (29 Apr 2024 18:22)  T(F): 98.6 (30 Apr 2024 12:00), Max: 98.7 (29 Apr 2024 18:22)  HR: 86 (30 Apr 2024 12:00) (72 - 90)  BP: 132/72 (30 Apr 2024 12:00) (115/59 - 139/69)  BP(mean): --  RR: 25 (30 Apr 2024 09:19) (15 - 25)  SpO2: 100% (30 Apr 2024 12:00) (98% - 100%)    Parameters below as of 30 Apr 2024 12:00  Patient On (Oxygen Delivery Method): ventilator                              8.8    9.99  )-----------( 190      ( 30 Apr 2024 10:25 )             28.7    04-30    133<L>  |  99  |  15  ----------------------------<  101<H>  3.7   |  20<L>  |  <0.30<L>    Ca    9.0      30 Apr 2024 10:25  Phos  2.6     04-30  Mg     1.8     04-30    TPro  7.1  /  Alb  3.1<L>  /  TBili  0.5  /  DBili  x   /  AST  92<H>  /  ALT  77<H>  /  AlkPhos  104  04-29       PHYSICAL EXAM:  Gen: NAD  Skin: No rashes, bruises, or lesions  Head: Normocephalic, Atraumatic  Face: no edema, erythema, or fluctuance. Parotid glands soft without mass  Eyes: no scleral injection  Nose: Nares bilaterally patent, no discharge  Mouth: No Stridor / Drooling / Trismus.  Mucosa moist, tongue/uvula midline, oropharynx clear  Neck: #8 distal XLT trach in place, cuff inflated, secured with velcro tie. + small area of granulation tissue located on superior aspect of stoma, was cauterized with silver nitrate. Flat, supple, no lymphadenopathy, trachea midline, no masses  Lymphatic: No lymphadenopathy  Resp: on ventilator  Neuro: facial nerve intact, no facial droop      Procedure Note  Procedure: Tracheoscopy  Indication: bloody tracheal secretions    Bedside tracheoscopy performed, nick visualized, no purulence, no erythema, no signs of active bleeding. Pt tolerated the procedure well without complications.

## 2024-04-30 NOTE — PROGRESS NOTE ADULT - ASSESSMENT
74F s/p EGD-assisted sutured closure of gastrocutaneous fistula, PEG-J placement 4/16/2024, s/p silver nitrate treatment of hypergranulation tissue at the gastrocutaneous fistula wound on 4/18 and 4/22. Swallow eval on 4/23 for comfort feeds with subsequent blue/green staining of old PEG site abdominal dressing 2/2 to dye used in swallow eval.     Plan/Recs  -Continue tube feeds via feeding port of PEG-J as tolerated  -Continue dry dressings daily  - suspect drainage from old PEG site and around new PEG site is saliva 2/2 gastroparesis. can place G -port to gravity to prevent leakage. Discussed with RCU ACP  - dispo planning per RCU team    ACS/Trauma Surgery  [id number] 74F s/p EGD-assisted sutured closure of gastrocutaneous fistula, PEG-J placement 4/16/2024, s/p silver nitrate treatment of hypergranulation tissue at the gastrocutaneous fistula wound on 4/18 and 4/22. Swallow eval on 4/23 for comfort feeds with subsequent blue/green staining of old PEG site abdominal dressing 2/2 to dye used in swallow eval.     Plan/Recs  -Continue tube feeds via feeding port of PEG-J as tolerated  -Continue dry dressings daily  - suspect drainage from old PEG site and around new PEG site is saliva 2/2 gastroparesis. can place G -port to gravity with emerson bag collection  to prevent leakage. Discussed with RCU ACP  - dispo planning per RCU team    ACS/Trauma Surgery  [id number]

## 2024-04-30 NOTE — PROGRESS NOTE ADULT - PROBLEM SELECTOR PLAN 1
- Patient p/w PEG tube dislodgement on admission   - CT A/P 4/2: Dislodged G tube with balloon in the anterior abdominal wall with air filled track to stomach  - S/p bedside exchange by IR on 4/3 ( # 20 Fr Kangaroo EnFit placed )  - 4/4: PEG leaking; IR adjusted PEG at bedside  - 4/5: PEG still leaking, adjusted at bedside by IR Attending.  - 4/8-4/9 IR aborted procedure; GI Dr. Cleaning consulted for new PEG  - 4/9 PEG fell out spontaneously, Mcbride placed into tract   - 4/12: Mcbride replaced with 18Fr PEG at bedside, bumper at 8cm (intentionally loose)  - CT A/P 4/12: G-tube in the stomach/Mild inflammation thickening along tract   - 4/14 18 fr PEG was used, feeds were re-initiated, attempt failed, PEG with copious amounts of leakage  - 4/16 S/p new PEG-J and closure of gastric fistula in endo suite with GI and surgery  - Patient Tolerating feeds at Goal Rate - Patient p/w PEG tube dislodgement on admission   - CT A/P 4/2: Dislodged G tube with balloon in the anterior abdominal wall with air filled track to stomach  - S/p bedside exchange by IR on 4/3 ( # 20 Fr Kangaroo EnFit placed )  - 4/4: PEG leaking; IR adjusted PEG at bedside  - 4/5: PEG still leaking, adjusted at bedside by IR Attending.  - 4/8-4/9 IR aborted procedure; GI Dr. Cleaning consulted for new PEG  - 4/9 PEG fell out spontaneously, Mcbride placed into tract   - 4/12: Mcbride replaced with 18Fr PEG at bedside, bumper at 8cm (intentionally loose)  - CT A/P 4/12: G-tube in the stomach/Mild inflammation thickening along tract   - 4/14 18 fr PEG was used, feeds were re-initiated, attempt failed, PEG with copious amounts of leakage  - 4/16 S/p new PEG-J and closure of gastric fistula in endo suite with GI and surgery  - old stoma site with persistent amounts of clear liquid leakage - surgery has been following, continue to monitor, no intervention necessary - ABD pads applied to site for absorption of liquid  - tolerating feeds at Goal Rate

## 2024-05-01 VITALS
SYSTOLIC BLOOD PRESSURE: 119 MMHG | DIASTOLIC BLOOD PRESSURE: 61 MMHG | TEMPERATURE: 98 F | OXYGEN SATURATION: 100 % | HEART RATE: 82 BPM

## 2024-05-01 LAB
GLUCOSE BLDC GLUCOMTR-MCNC: 137 MG/DL — HIGH (ref 70–99)
GLUCOSE BLDC GLUCOMTR-MCNC: 172 MG/DL — HIGH (ref 70–99)
GLUCOSE BLDC GLUCOMTR-MCNC: 300 MG/DL — HIGH (ref 70–99)

## 2024-05-01 PROCEDURE — 82947 ASSAY GLUCOSE BLOOD QUANT: CPT

## 2024-05-01 PROCEDURE — 87186 SC STD MICRODIL/AGAR DIL: CPT

## 2024-05-01 PROCEDURE — 94799 UNLISTED PULMONARY SVC/PX: CPT

## 2024-05-01 PROCEDURE — 85045 AUTOMATED RETICULOCYTE COUNT: CPT

## 2024-05-01 PROCEDURE — 93970 EXTREMITY STUDY: CPT

## 2024-05-01 PROCEDURE — 96375 TX/PRO/DX INJ NEW DRUG ADDON: CPT

## 2024-05-01 PROCEDURE — 87070 CULTURE OTHR SPECIMN AEROBIC: CPT

## 2024-05-01 PROCEDURE — 71260 CT THORAX DX C+: CPT | Mod: MC

## 2024-05-01 PROCEDURE — 36569 INSJ PICC 5 YR+ W/O IMAGING: CPT

## 2024-05-01 PROCEDURE — 86870 RBC ANTIBODY IDENTIFICATION: CPT

## 2024-05-01 PROCEDURE — 83605 ASSAY OF LACTIC ACID: CPT

## 2024-05-01 PROCEDURE — 81001 URINALYSIS AUTO W/SCOPE: CPT

## 2024-05-01 PROCEDURE — 83036 HEMOGLOBIN GLYCOSYLATED A1C: CPT

## 2024-05-01 PROCEDURE — 87640 STAPH A DNA AMP PROBE: CPT

## 2024-05-01 PROCEDURE — 94002 VENT MGMT INPAT INIT DAY: CPT

## 2024-05-01 PROCEDURE — 82746 ASSAY OF FOLIC ACID SERUM: CPT

## 2024-05-01 PROCEDURE — 99024 POSTOP FOLLOW-UP VISIT: CPT

## 2024-05-01 PROCEDURE — 84100 ASSAY OF PHOSPHORUS: CPT

## 2024-05-01 PROCEDURE — 92610 EVALUATE SWALLOWING FUNCTION: CPT

## 2024-05-01 PROCEDURE — 83540 ASSAY OF IRON: CPT

## 2024-05-01 PROCEDURE — 81003 URINALYSIS AUTO W/O SCOPE: CPT

## 2024-05-01 PROCEDURE — 85014 HEMATOCRIT: CPT

## 2024-05-01 PROCEDURE — 99232 SBSQ HOSP IP/OBS MODERATE 35: CPT

## 2024-05-01 PROCEDURE — 84132 ASSAY OF SERUM POTASSIUM: CPT

## 2024-05-01 PROCEDURE — 84436 ASSAY OF TOTAL THYROXINE: CPT

## 2024-05-01 PROCEDURE — 87086 URINE CULTURE/COLONY COUNT: CPT

## 2024-05-01 PROCEDURE — 99233 SBSQ HOSP IP/OBS HIGH 50: CPT | Mod: FS

## 2024-05-01 PROCEDURE — 87637 SARSCOV2&INF A&B&RSV AMP PRB: CPT

## 2024-05-01 PROCEDURE — 80061 LIPID PANEL: CPT

## 2024-05-01 PROCEDURE — 94003 VENT MGMT INPAT SUBQ DAY: CPT

## 2024-05-01 PROCEDURE — 36430 TRANSFUSION BLD/BLD COMPNT: CPT

## 2024-05-01 PROCEDURE — 94640 AIRWAY INHALATION TREATMENT: CPT

## 2024-05-01 PROCEDURE — 74018 RADEX ABDOMEN 1 VIEW: CPT

## 2024-05-01 PROCEDURE — 86901 BLOOD TYPING SEROLOGIC RH(D): CPT

## 2024-05-01 PROCEDURE — 85730 THROMBOPLASTIN TIME PARTIAL: CPT

## 2024-05-01 PROCEDURE — 86900 BLOOD TYPING SEROLOGIC ABO: CPT

## 2024-05-01 PROCEDURE — 86880 COOMBS TEST DIRECT: CPT

## 2024-05-01 PROCEDURE — 87150 DNA/RNA AMPLIFIED PROBE: CPT

## 2024-05-01 PROCEDURE — C1751: CPT

## 2024-05-01 PROCEDURE — 87040 BLOOD CULTURE FOR BACTERIA: CPT

## 2024-05-01 PROCEDURE — 85018 HEMOGLOBIN: CPT

## 2024-05-01 PROCEDURE — 71045 X-RAY EXAM CHEST 1 VIEW: CPT

## 2024-05-01 PROCEDURE — 84295 ASSAY OF SERUM SODIUM: CPT

## 2024-05-01 PROCEDURE — 97163 PT EVAL HIGH COMPLEX 45 MIN: CPT

## 2024-05-01 PROCEDURE — 36415 COLL VENOUS BLD VENIPUNCTURE: CPT

## 2024-05-01 PROCEDURE — 99285 EMERGENCY DEPT VISIT HI MDM: CPT | Mod: 25

## 2024-05-01 PROCEDURE — 84443 ASSAY THYROID STIM HORMONE: CPT

## 2024-05-01 PROCEDURE — 74177 CT ABD & PELVIS W/CONTRAST: CPT | Mod: MC

## 2024-05-01 PROCEDURE — 84439 ASSAY OF FREE THYROXINE: CPT

## 2024-05-01 PROCEDURE — 87184 SC STD DISK METHOD PER PLATE: CPT

## 2024-05-01 PROCEDURE — 80053 COMPREHEN METABOLIC PANEL: CPT

## 2024-05-01 PROCEDURE — L8699: CPT

## 2024-05-01 PROCEDURE — P9016: CPT

## 2024-05-01 PROCEDURE — 83615 LACTATE (LD) (LDH) ENZYME: CPT

## 2024-05-01 PROCEDURE — 82607 VITAMIN B-12: CPT

## 2024-05-01 PROCEDURE — 97110 THERAPEUTIC EXERCISES: CPT

## 2024-05-01 PROCEDURE — 87641 MR-STAPH DNA AMP PROBE: CPT

## 2024-05-01 PROCEDURE — 82330 ASSAY OF CALCIUM: CPT

## 2024-05-01 PROCEDURE — C9399: CPT

## 2024-05-01 PROCEDURE — 86922 COMPATIBILITY TEST ANTIGLOB: CPT

## 2024-05-01 PROCEDURE — 92612 ENDOSCOPY SWALLOW (FEES) VID: CPT

## 2024-05-01 PROCEDURE — 82962 GLUCOSE BLOOD TEST: CPT

## 2024-05-01 PROCEDURE — 83550 IRON BINDING TEST: CPT

## 2024-05-01 PROCEDURE — 86902 BLOOD TYPE ANTIGEN DONOR EA: CPT

## 2024-05-01 PROCEDURE — 83735 ASSAY OF MAGNESIUM: CPT

## 2024-05-01 PROCEDURE — 82728 ASSAY OF FERRITIN: CPT

## 2024-05-01 PROCEDURE — 84145 PROCALCITONIN (PCT): CPT

## 2024-05-01 PROCEDURE — 85385 FIBRINOGEN ANTIGEN: CPT

## 2024-05-01 PROCEDURE — 93005 ELECTROCARDIOGRAM TRACING: CPT

## 2024-05-01 PROCEDURE — 87077 CULTURE AEROBIC IDENTIFY: CPT

## 2024-05-01 PROCEDURE — 85027 COMPLETE CBC AUTOMATED: CPT

## 2024-05-01 PROCEDURE — 85610 PROTHROMBIN TIME: CPT

## 2024-05-01 PROCEDURE — 86850 RBC ANTIBODY SCREEN: CPT

## 2024-05-01 PROCEDURE — 83010 ASSAY OF HAPTOGLOBIN QUANT: CPT

## 2024-05-01 PROCEDURE — 85025 COMPLETE CBC W/AUTO DIFF WBC: CPT

## 2024-05-01 PROCEDURE — 82803 BLOOD GASES ANY COMBINATION: CPT

## 2024-05-01 PROCEDURE — 80048 BASIC METABOLIC PNL TOTAL CA: CPT

## 2024-05-01 PROCEDURE — C1889: CPT

## 2024-05-01 PROCEDURE — 82435 ASSAY OF BLOOD CHLORIDE: CPT

## 2024-05-01 PROCEDURE — 49465 FLUORO EXAM OF G/COLON TUBE: CPT

## 2024-05-01 PROCEDURE — 96374 THER/PROPH/DIAG INJ IV PUSH: CPT

## 2024-05-01 RX ORDER — IPRATROPIUM/ALBUTEROL SULFATE 18-103MCG
3 AEROSOL WITH ADAPTER (GRAM) INHALATION
Qty: 99 | Refills: 3
Start: 2024-05-01 | End: 2024-08-28

## 2024-05-01 RX ORDER — INSULIN GLARGINE 100 [IU]/ML
12 INJECTION, SOLUTION SUBCUTANEOUS
Qty: 30 | Refills: 0
Start: 2024-05-01 | End: 2024-05-30

## 2024-05-01 RX ORDER — LIDOCAINE 4 G/100G
1 CREAM TOPICAL
Qty: 30 | Refills: 0
Start: 2024-05-01 | End: 2024-05-30

## 2024-05-01 RX ORDER — PANTOPRAZOLE SODIUM 20 MG/1
40 TABLET, DELAYED RELEASE ORAL
Qty: 30 | Refills: 0
Start: 2024-05-01 | End: 2024-05-30

## 2024-05-01 RX ORDER — INSULIN HUMAN 100 [IU]/ML
9 INJECTION, SOLUTION SUBCUTANEOUS
Qty: 10 | Refills: 0
Start: 2024-05-01 | End: 2024-05-30

## 2024-05-01 RX ORDER — INSULIN HUMAN 100 [IU]/ML
7 INJECTION, SOLUTION SUBCUTANEOUS
Qty: 10 | Refills: 0
Start: 2024-05-01 | End: 2024-05-30

## 2024-05-01 RX ORDER — DICLOFENAC SODIUM 30 MG/G
1 GEL TOPICAL
Qty: 3 | Refills: 3
Start: 2024-05-01 | End: 2024-08-28

## 2024-05-01 RX ORDER — LIDOCAINE 4 G/100G
1 CREAM TOPICAL
Qty: 30 | Refills: 5
Start: 2024-05-01 | End: 2024-10-27

## 2024-05-01 RX ORDER — INSULIN HUMAN 100 [IU]/ML
11 INJECTION, SOLUTION SUBCUTANEOUS
Qty: 10 | Refills: 0
Start: 2024-05-01 | End: 2024-05-30

## 2024-05-01 RX ORDER — GABAPENTIN 400 MG/1
100 CAPSULE ORAL
Qty: 500 | Refills: 4
Start: 2024-05-01 | End: 2024-09-27

## 2024-05-01 RX ORDER — INSULIN HUMAN 100 [IU]/ML
27 INJECTION, SOLUTION SUBCUTANEOUS
Qty: 10 | Refills: 3
Start: 2024-05-01 | End: 2024-08-28

## 2024-05-01 RX ADMIN — DICLOFENAC SODIUM 4 GRAM(S): 30 GEL TOPICAL at 12:40

## 2024-05-01 RX ADMIN — QUETIAPINE FUMARATE 12.5 MILLIGRAM(S): 200 TABLET, FILM COATED ORAL at 15:43

## 2024-05-01 RX ADMIN — INSULIN HUMAN 11 UNIT(S): 100 INJECTION, SOLUTION SUBCUTANEOUS at 06:06

## 2024-05-01 RX ADMIN — Medication 15 MILLILITER(S): at 12:37

## 2024-05-01 RX ADMIN — Medication 1 DROP(S): at 04:07

## 2024-05-01 RX ADMIN — Medication 1 DROP(S): at 16:23

## 2024-05-01 RX ADMIN — NYSTATIN CREAM 1 APPLICATION(S): 100000 CREAM TOPICAL at 06:06

## 2024-05-01 RX ADMIN — Medication 1 DROP(S): at 12:38

## 2024-05-01 RX ADMIN — CHLORHEXIDINE GLUCONATE 15 MILLILITER(S): 213 SOLUTION TOPICAL at 06:06

## 2024-05-01 RX ADMIN — CHLORHEXIDINE GLUCONATE 1 APPLICATION(S): 213 SOLUTION TOPICAL at 06:07

## 2024-05-01 RX ADMIN — PANTOPRAZOLE SODIUM 40 MILLIGRAM(S): 20 TABLET, DELAYED RELEASE ORAL at 12:36

## 2024-05-01 RX ADMIN — NYSTATIN CREAM 1 APPLICATION(S): 100000 CREAM TOPICAL at 06:02

## 2024-05-01 RX ADMIN — Medication 1 DROP(S): at 12:37

## 2024-05-01 RX ADMIN — Medication 1 DROP(S): at 06:03

## 2024-05-01 RX ADMIN — Medication 3 MILLILITER(S): at 11:21

## 2024-05-01 RX ADMIN — Medication 1 DROP(S): at 10:00

## 2024-05-01 RX ADMIN — Medication 1 DROP(S): at 09:17

## 2024-05-01 RX ADMIN — Medication 3 MILLILITER(S): at 17:19

## 2024-05-01 RX ADMIN — Medication 125 MICROGRAM(S): at 06:03

## 2024-05-01 RX ADMIN — Medication 1 APPLICATION(S): at 06:04

## 2024-05-01 RX ADMIN — GABAPENTIN 100 MILLIGRAM(S): 400 CAPSULE ORAL at 09:58

## 2024-05-01 RX ADMIN — DICLOFENAC SODIUM 4 GRAM(S): 30 GEL TOPICAL at 06:05

## 2024-05-01 RX ADMIN — SIMETHICONE 80 MILLIGRAM(S): 80 TABLET, CHEWABLE ORAL at 06:03

## 2024-05-01 RX ADMIN — LIDOCAINE 1 PATCH: 4 CREAM TOPICAL at 05:58

## 2024-05-01 RX ADMIN — Medication 500 MILLIGRAM(S): at 12:37

## 2024-05-01 RX ADMIN — INSULIN HUMAN 9 UNIT(S): 100 INJECTION, SOLUTION SUBCUTANEOUS at 12:39

## 2024-05-01 RX ADMIN — SIMETHICONE 80 MILLIGRAM(S): 80 TABLET, CHEWABLE ORAL at 12:39

## 2024-05-01 RX ADMIN — Medication 1 TABLET(S): at 12:37

## 2024-05-01 RX ADMIN — Medication 5 MILLILITER(S): at 12:36

## 2024-05-01 RX ADMIN — Medication 1 DROP(S): at 14:23

## 2024-05-01 RX ADMIN — Medication 125 MILLIGRAM(S): at 12:37

## 2024-05-01 RX ADMIN — Medication 5 MILLILITER(S): at 06:02

## 2024-05-01 RX ADMIN — INSULIN HUMAN 2: 100 INJECTION, SOLUTION SUBCUTANEOUS at 12:38

## 2024-05-01 RX ADMIN — Medication 1 DROP(S): at 02:00

## 2024-05-01 RX ADMIN — Medication 5 MILLIGRAM(S): at 09:58

## 2024-05-01 RX ADMIN — AMLODIPINE BESYLATE 10 MILLIGRAM(S): 2.5 TABLET ORAL at 10:00

## 2024-05-01 RX ADMIN — Medication 3 MILLILITER(S): at 05:45

## 2024-05-01 RX ADMIN — Medication 2000 UNIT(S): at 12:36

## 2024-05-01 NOTE — PROGRESS NOTE ADULT - ASSESSMENT
73 yo F PMH T2DM on insulin, HTN, HLD, hypothyroidism, RA on Prednisone, Fibromyalgia, cerebral aneurysm s/p repair, chronic hypercapnic respiratory failure s/p trach (vent dependent) and Chronic PEG 2022, recurrent C. diff infection (follows with Dr. Newman) , bedbound who presented from home with G tube dislodgement and redness around the site. Had CT Abdomen/Pelvis done showing dislodgement of G tube with balloon in the anterior abdominal wall with air filled track to stomach. Admitted to MICU for ventilator management and given vancomycin/meropenem empirically for treatment of abd wall cellulitis. Per family patient has had Known c diff infection for past 2-3 weeks and was being treated with Fidaxomicin, which completed inpatient.  IR team exchanged PEG on 4/3 however later in the day it remained with leakage.  IR Attending adjusted PEG at bedside on 4/4 and PEG remained with moderate amount of PEG leakage once feeds initiated at reduced rate.  GI team re-consulted as second opinion for PEG management.  On 4/9 PEG was found out of place, a emerson catheter was placed in the stoma to maintain patency.  Patient was planned for PEG revision with both Surgery and GI team involved on 4/11 however patient had fevers up to 102F and procedure was cancelled for infectious work-up.  An 18 Fr PEG tube placed at bedside on 4/12 (original PEG size was 20Fr) as stoma site appears to have closed. Antibiotics were switched from Meropenem to Cefepime, completed 4/14. Patient now s/p PEG-J and closure of gastric fistula with GI and surgery in endo suite on 4/16.  New stoma site with no leakage, pt has been tolerating feeds.  Old stoma site skin intact, continues to have mild/moderate amounts of clear drainage, surgery has been following and have suggested no other recommendations.  A FEES was performed on 4/23 with recs for comfort feeds with parameters as per SLP.  Pt to continue vanco taper until 05/23 for c diff treatment with outpatient follow up with Dr. Newman.  PST as tolerated.  Pt medically stable to return home.    4/30:  Pending D/C home tomorrow.  Surgery called to evaluate old stoma site/leakage.  ENT called for blood tinged secretions to evaluate trach since exchange Friday.  Endocrine called for d/c recommendations. 73 yo F PMH T2DM on insulin, HTN, HLD, hypothyroidism, RA on Prednisone, Fibromyalgia, cerebral aneurysm s/p repair, chronic hypercapnic respiratory failure s/p trach (vent dependent) and Chronic PEG 2022, recurrent C. diff infection (follows with Dr. Newman) , bedbound who presented from home with G tube dislodgement and redness around the site. Had CT Abdomen/Pelvis done showing dislodgement of G tube with balloon in the anterior abdominal wall with air filled track to stomach. Admitted to MICU for ventilator management and given vancomycin/meropenem empirically for treatment of abd wall cellulitis. Per family patient has had Known c diff infection for past 2-3 weeks and was being treated with Fidaxomicin, which completed inpatient.  IR team exchanged PEG on 4/3 however later in the day it remained with leakage.  IR Attending adjusted PEG at bedside on 4/4 and PEG remained with moderate amount of PEG leakage once feeds initiated at reduced rate.  GI team re-consulted as second opinion for PEG management.  On 4/9 PEG was found out of place, a emerson catheter was placed in the stoma to maintain patency.  Patient was planned for PEG revision with both Surgery and GI team involved on 4/11 however patient had fevers up to 102F and procedure was cancelled for infectious work-up.  An 18 Fr PEG tube placed at bedside on 4/12 (original PEG size was 20Fr) as stoma site appears to have closed. Antibiotics were switched from Meropenem to Cefepime, completed 4/14. Patient now s/p PEG-J and closure of gastric fistula with GI and surgery in endo suite on 4/16.  New stoma site with no leakage, pt has been tolerating feeds.  Old stoma site skin intact, continues to have mild/moderate amounts of clear drainage, surgery has been following and have suggested no other recommendations.  A FEES was performed on 4/23 with recs for comfort feeds with parameters as per SLP.  Pt to continue vanco taper until 05/23 for c diff treatment with outpatient follow up with Dr. Newman.  PST as tolerated.  Pt medically stable to return home.    5/1:  D/C planning.

## 2024-05-01 NOTE — PROGRESS NOTE ADULT - PROBLEM SELECTOR PLAN 1
- continue Lantus 12 units at 6PM  - Adjust  Admelog to Regular insulin (longer acting)  9 units at 0600, increase Regular insulin to 9 units at 1200, continue 7 units at 1800,  NO DOSE AT MN !(HOLD if tube feeds are stopped).  - C/w mod dose admelog correction scale to moderate dose Regular insulin scale q6h  - Please check FSG q6h   - Inpatient glucose goals: 100-180  DISCHARGE PLANNING:  - Discharge recs pending clinical course and tube feeds on discharge: likely c/w lantus plus regular insulin at 6AM, 12PM, 6PM  - followup with Dr Ruth: [facility]: [address] [place] [address]. [address]. [place] [address]. Tel: [phone] Test BG q6h  - C/w  Lantus 12 units at 6PM  - Adjust Regular insulin to 13 units at 0600, increase Regular insulin 13 units at 1200, continue 7 units at 1800,  NO DOSE AT MN !(HOLD if tube feeds are stopped).  - C/w mod dose Regular insulin scale q6h  DISCHARGE PLANNING:  - Discharge recs pending clinical course and tube feeds on discharge: likely c/w lantus plus regular insulin at 6AM, 12PM, 6PM  - followup with Dr Ruth: [facility]: [address] [place] [address]. [address]. [place] [address].

## 2024-05-01 NOTE — PROGRESS NOTE ADULT - REASON FOR ADMISSION
Dislodged G Tube

## 2024-05-01 NOTE — PROGRESS NOTE ADULT - TIME BILLING
review of the medical record, interpretation of labs, imaging studies, discussion with interdisciplinary team, physical exam and medical decision making as above no

## 2024-05-01 NOTE — PROGRESS NOTE ADULT - PROBLEM SELECTOR PLAN 3
Thyroid Function Tests:  04-29 @ 11:01 TSH 0.84 FreeT4 0.9 T3 -- Anti TPO -- Anti Thyroglobulin Ab -- TSI --  04-23 @ 04:35 TSH 0.13 FreeT4 -- T3 -- Anti TPO -- Anti Thyroglobulin Ab -- TSI --  - Continue home dose of LT5 125 mcg PO daily. Please administer in the AM on an empty stomach, 1 hour before food or other medications, and 4 hours before any PPI, calcium, or iron supplements. Can give at 5AM since tube feeds start at 6AM.

## 2024-05-01 NOTE — PROGRESS NOTE ADULT - PROBLEM/PLAN-6
DISPLAY PLAN FREE TEXT
No complaints
DISPLAY PLAN FREE TEXT

## 2024-05-01 NOTE — PROGRESS NOTE ADULT - SUBJECTIVE AND OBJECTIVE BOX
Patient is a 74y old  Female who presents with a chief complaint of Dislodged G Tube (30 Apr 2024 17:15)      Interval Events:    REVIEW OF SYSTEMS:  [ ] Positive  [ ] All other systems negative  [ ] Unable to assess ROS because ________    Vital Signs Last 24 Hrs  T(C): 36.7 (05-01-24 @ 06:00), Max: 37.3 (05-01-24 @ 00:00)  T(F): 98 (05-01-24 @ 06:00), Max: 99.2 (05-01-24 @ 00:00)  HR: 78 (05-01-24 @ 06:00) (70 - 90)  BP: 110/59 (05-01-24 @ 06:00) (107/55 - 132/72)  RR: 16 (05-01-24 @ 06:00) (16 - 25)  SpO2: 100% (05-01-24 @ 06:00) (98% - 100%)    PHYSICAL EXAM:  HEENT:   [ ]Tracheostomy:  [ ]Pupils equal  [ ]No oral lesions  [ ]Abnormal    SKIN  [ ]No Rash  [ ] Abnormal  [ ] pressure    CARDIAC  [ ]Regular  [ ]Abnormal    PULMONARY  [ ]Bilateral Clear Breath Sounds  [ ]Normal Excursion  [ ]Abnormal    GI  [ ]PEG      [ ] +BS		              [ ]Soft, nondistended, nontender	  [ ]Abnormal    MUSCULOSKELETAL                                   [ ]Bedbound                 [ ]Abnormal    [ ]Ambulatory/OOB to chair                           EXTREMITIES                                         [ ]Normal  [ ]Edema                           NEUROLOGIC  [ ] Normal, non focal  [ ] Focal findings:    PSYCHIATRIC  [ ]Alert and appropriate  [ ] Sedated	 [ ]Agitated    :  Mcbride: [ ] Yes, if yes: Date of Placement:                   [  ] No    LINES: Central Lines [ ] Yes, if yes: Date of Placement                                     [  ] No    HOSPITAL MEDICATIONS:  MEDICATIONS  (STANDING):  albuterol/ipratropium for Nebulization 3 milliLiter(s) Nebulizer every 6 hours  amLODIPine   Tablet 10 milliGRAM(s) Oral <User Schedule>  artificial tears (preservative free) Ophthalmic Solution 1 Drop(s) Both EYES every 2 hours  ascorbic acid 500 milliGRAM(s) Oral daily  Biotene Dry Mouth Oral Rinse 5 milliLiter(s) Swish and Spit every 6 hours  bismuth subsalicylate Liquid 15 milliLiter(s) Oral <User Schedule>  chlorhexidine 0.12% Liquid 15 milliLiter(s) Oral Mucosa every 12 hours  chlorhexidine 4% Liquid 1 Application(s) Topical <User Schedule>  cholecalciferol 2000 Unit(s) Oral daily  diclofenac sodium 1% Gel 4 Gram(s) Topical four times a day  erythromycin   Ointment 1 Application(s) Both EYES at bedtime  escitalopram 10 milliGRAM(s) Oral <User Schedule>  fentaNYL   Patch  25 MICROgram(s)/Hr 1 Patch Transdermal every 72 hours  gabapentin Solution 100 milliGRAM(s) Oral <User Schedule>  insulin glargine Injectable (LANTUS) 12 Unit(s) SubCutaneous <User Schedule>  insulin regular  human corrective regimen sliding scale   SubCutaneous <User Schedule>  insulin regular  human recombinant 11 Unit(s) SubCutaneous <User Schedule>  insulin regular  human recombinant 9 Unit(s) SubCutaneous <User Schedule>  insulin regular  human recombinant 7 Unit(s) SubCutaneous <User Schedule>  lactobacillus acidophilus 1 Tablet(s) Oral daily  levothyroxine 125 MICROGram(s) Oral daily  lidocaine   4% Patch 1 Patch Transdermal at bedtime  lidocaine   4% Patch 1 Patch Transdermal at bedtime  melatonin 3 milliGRAM(s) Oral at bedtime  multivitamin/minerals/iron Oral Solution (CENTRUM) 15 milliLiter(s) Oral daily  nystatin Ointment 1 Application(s) Topical every 12 hours  nystatin Powder 1 Application(s) Topical two times a day  pantoprazole   Suspension 40 milliGRAM(s) Oral daily  prednisoLONE acetate 1% Suspension 1 Drop(s) Both EYES four times a day  predniSONE  Solution 5 milliGRAM(s) Oral <User Schedule>  QUEtiapine 12.5 milliGRAM(s) Oral <User Schedule>  simethicone 80 milliGRAM(s) Chew every 6 hours  surgical lubricant sterile 1 Application(s) Topical every 8 hours  vancomycin    Solution 125 milliGRAM(s) Enteral Tube daily    MEDICATIONS  (PRN):  acetaminophen   Oral Liquid .. 1000 milliGRAM(s) Enteral Tube every 6 hours PRN Temp greater or equal to 38C (100.4F), Mild Pain (1 - 3)  calamine/zinc oxide Lotion 1 Application(s) Topical daily PRN Rash and/or Itching  ondansetron Injectable 4 milliGRAM(s) IV Push every 8 hours PRN Nausea and/or Vomiting  oxyCODONE    Solution 5 milliGRAM(s) Enteral Tube every 4 hours PRN Moderate Pain (4 - 6)  sodium chloride 0.65% Nasal 1 Spray(s) Both Nostrils every 12 hours PRN Congestion      LABS:                        8.8    9.99  )-----------( 190      ( 30 Apr 2024 10:25 )             28.7     04-30    133<L>  |  99  |  15  ----------------------------<  101<H>  3.7   |  20<L>  |  <0.30<L>    Ca    9.0      30 Apr 2024 10:25  Phos  2.6     04-30  Mg     1.8     04-30    TPro  7.1  /  Alb  3.1<L>  /  TBili  0.5  /  DBili  x   /  AST  92<H>  /  ALT  77<H>  /  AlkPhos  104  04-29      Urinalysis Basic - ( 30 Apr 2024 10:25 )    Color: x / Appearance: x / SG: x / pH: x  Gluc: 101 mg/dL / Ketone: x  / Bili: x / Urobili: x   Blood: x / Protein: x / Nitrite: x   Leuk Esterase: x / RBC: x / WBC x   Sq Epi: x / Non Sq Epi: x / Bacteria: x          CAPILLARY BLOOD GLUCOSE    MICROBIOLOGY:     RADIOLOGY:  [ ] Reviewed and interpreted by me    Mode: AC/ CMV (Assist Control/ Continuous Mandatory Ventilation)  RR (machine): 12  TV (machine): 350  FiO2: 30  PEEP: 5  ITime: 1  MAP: 8  PIP: 24   Patient is a 74y old  Female who presents with a chief complaint of Dislodged G Tube (30 Apr 2024 17:15)      Interval Events:  No acute events overnight.    REVIEW OF SYSTEMS:  [ ] Positive  [X] All other systems negative  [ ] Unable to assess ROS because ________    Vital Signs Last 24 Hrs  T(C): 36.7 (05-01-24 @ 06:00), Max: 37.3 (05-01-24 @ 00:00)  T(F): 98 (05-01-24 @ 06:00), Max: 99.2 (05-01-24 @ 00:00)  HR: 78 (05-01-24 @ 06:00) (70 - 90)  BP: 110/59 (05-01-24 @ 06:00) (107/55 - 132/72)  RR: 16 (05-01-24 @ 06:00) (16 - 25)  SpO2: 100% (05-01-24 @ 06:00) (98% - 100%)    PHYSICAL EXAM:  HEENT:   [X]Tracheostomy: #8 distal XLT cuffed shiley (exchanged 4/25)  [X]Pupils equal  [ ]No oral lesions  [ ]Abnormal    SKIN  [ ]No Rash  [X] Abnormal - perineal IAD  [X] pressure - sacral/BL buttocks stage 4 pressure injury     CARDIAC  [X]Regular  [ ]Abnormal    PULMONARY  [X]Bilateral Clear Breath Sounds  [ ]Normal Excursion  [ ]Abnormal    GI  [X]PEG - PEG-J site c/d/i; old PEG site w/ mild clear leakage otherwise skin intact  [X] +BS		              [X]Soft, nondistended, nontender	  [X]Abnormal - rectal tube    MUSCULOSKELETAL                                   [X]Bedbound                 [ ]Abnormal    [ ]Ambulatory/OOB to chair                           EXTREMITIES                                         [X]Normal  [ ]Edema                           NEUROLOGIC  [X] Normal, non focal  [ ] Focal findings:    PSYCHIATRIC  [X]Alert and appropriate  [ ] Sedated	 [ ]Agitated    :  Mcbride: [ ] Yes, if yes: Date of Placement:                   [X] No    LINES: Central Lines [ ] Yes, if yes: Date of Placement                                     [X] No    HOSPITAL MEDICATIONS:  MEDICATIONS  (STANDING):  albuterol/ipratropium for Nebulization 3 milliLiter(s) Nebulizer every 6 hours  amLODIPine   Tablet 10 milliGRAM(s) Oral <User Schedule>  artificial tears (preservative free) Ophthalmic Solution 1 Drop(s) Both EYES every 2 hours  ascorbic acid 500 milliGRAM(s) Oral daily  Biotene Dry Mouth Oral Rinse 5 milliLiter(s) Swish and Spit every 6 hours  bismuth subsalicylate Liquid 15 milliLiter(s) Oral <User Schedule>  chlorhexidine 0.12% Liquid 15 milliLiter(s) Oral Mucosa every 12 hours  chlorhexidine 4% Liquid 1 Application(s) Topical <User Schedule>  cholecalciferol 2000 Unit(s) Oral daily  diclofenac sodium 1% Gel 4 Gram(s) Topical four times a day  erythromycin   Ointment 1 Application(s) Both EYES at bedtime  escitalopram 10 milliGRAM(s) Oral <User Schedule>  fentaNYL   Patch  25 MICROgram(s)/Hr 1 Patch Transdermal every 72 hours  gabapentin Solution 100 milliGRAM(s) Oral <User Schedule>  insulin glargine Injectable (LANTUS) 12 Unit(s) SubCutaneous <User Schedule>  insulin regular  human corrective regimen sliding scale   SubCutaneous <User Schedule>  insulin regular  human recombinant 11 Unit(s) SubCutaneous <User Schedule>  insulin regular  human recombinant 9 Unit(s) SubCutaneous <User Schedule>  insulin regular  human recombinant 7 Unit(s) SubCutaneous <User Schedule>  lactobacillus acidophilus 1 Tablet(s) Oral daily  levothyroxine 125 MICROGram(s) Oral daily  lidocaine   4% Patch 1 Patch Transdermal at bedtime  lidocaine   4% Patch 1 Patch Transdermal at bedtime  melatonin 3 milliGRAM(s) Oral at bedtime  multivitamin/minerals/iron Oral Solution (CENTRUM) 15 milliLiter(s) Oral daily  nystatin Ointment 1 Application(s) Topical every 12 hours  nystatin Powder 1 Application(s) Topical two times a day  pantoprazole   Suspension 40 milliGRAM(s) Oral daily  prednisoLONE acetate 1% Suspension 1 Drop(s) Both EYES four times a day  predniSONE  Solution 5 milliGRAM(s) Oral <User Schedule>  QUEtiapine 12.5 milliGRAM(s) Oral <User Schedule>  simethicone 80 milliGRAM(s) Chew every 6 hours  surgical lubricant sterile 1 Application(s) Topical every 8 hours  vancomycin    Solution 125 milliGRAM(s) Enteral Tube daily    MEDICATIONS  (PRN):  acetaminophen   Oral Liquid .. 1000 milliGRAM(s) Enteral Tube every 6 hours PRN Temp greater or equal to 38C (100.4F), Mild Pain (1 - 3)  calamine/zinc oxide Lotion 1 Application(s) Topical daily PRN Rash and/or Itching  ondansetron Injectable 4 milliGRAM(s) IV Push every 8 hours PRN Nausea and/or Vomiting  oxyCODONE    Solution 5 milliGRAM(s) Enteral Tube every 4 hours PRN Moderate Pain (4 - 6)  sodium chloride 0.65% Nasal 1 Spray(s) Both Nostrils every 12 hours PRN Congestion    LABS:                        8.8    9.99  )-----------( 190      ( 30 Apr 2024 10:25 )             28.7     04-30    133<L>  |  99  |  15  ----------------------------<  101<H>  3.7   |  20<L>  |  <0.30<L>    Ca    9.0      30 Apr 2024 10:25  Phos  2.6     04-30  Mg     1.8     04-30    TPro  7.1  /  Alb  3.1<L>  /  TBili  0.5  /  DBili  x   /  AST  92<H>  /  ALT  77<H>  /  AlkPhos  104  04-29    Urinalysis Basic - ( 30 Apr 2024 10:25 )    Color: x / Appearance: x / SG: x / pH: x  Gluc: 101 mg/dL / Ketone: x  / Bili: x / Urobili: x   Blood: x / Protein: x / Nitrite: x   Leuk Esterase: x / RBC: x / WBC x   Sq Epi: x / Non Sq Epi: x / Bacteria: x    CAPILLARY BLOOD GLUCOSE    MICROBIOLOGY:     RADIOLOGY:  [ ] Reviewed and interpreted by me    Mode: AC/ CMV (Assist Control/ Continuous Mandatory Ventilation)  RR (machine): 12  TV (machine): 350  FiO2: 30  PEEP: 5  ITime: 1  MAP: 8  PIP: 24

## 2024-05-01 NOTE — PROGRESS NOTE ADULT - PROBLEM SELECTOR PROBLEM 1
Tracheostomy in place
Uncontrolled type 2 diabetes mellitus with hyperglycemia
Dislodged gastrostomy tube
Dislodged gastrostomy tube
Tracheostomy in place
Dislodged gastrostomy tube
Uncontrolled type 2 diabetes mellitus with hyperglycemia
Dislodged gastrostomy tube
Uncontrolled type 2 diabetes mellitus with hyperglycemia
Dislodged gastrostomy tube
Uncontrolled type 2 diabetes mellitus with hyperglycemia
Uncontrolled type 2 diabetes mellitus with hyperglycemia
Dislodged gastrostomy tube
Uncontrolled type 2 diabetes mellitus with hyperglycemia
Dislodged gastrostomy tube

## 2024-05-01 NOTE — PROGRESS NOTE ADULT - ASSESSMENT
75 y/o F w/h/o T2DM with unknown control due to anemia of chronic disease skewing A1C levels. On Lantus and regular insulin PTA while on 18h TFs at home. Unknown DM complications. Also h/o HTN, HLD, anemia, hypothyroidism, RA> on Prednisone, fibromyalgia, remote cerebral aneurysm repair, acute hypercapnic respiratory failure now with tracheostomy, PEG tube, and bedbound (trach change 8/18, PEG change 8/14), sacral pressure ulcer, recent admission with bleeding from the tracheostomy and PEG tube with associated abdominal pain. Here with possible G tube dislodgement and redness around the site, now s/p replacement. Endocrinology consulted for hyperglycemia. Tolerating TFs with BG above goal at 12 noon and 6pm while on present regular insulin doses likely due to TF and Prednisone effect. Also TFs were changed to [employer] peptide from [employer] glucose control with more carb content. Will adjust insulin doses at 6am and 12 noon to keep  BG Goal 100-180mg/dl. No hypoglycemia.       Home regimen: Patient is on TF from 6AM-6PM. Takes Lantus 15 units qhs and regular insulin 10 units at 6AM, 12 units at 12PM, and 6 units at 6PM. She is on prednisone 5mg daily for RA

## 2024-05-01 NOTE — PROGRESS NOTE ADULT - PROBLEM SELECTOR PLAN 7
- Possibly reactive in setting of antibiotics/active infection  - History of AOCD  - Patient with slightly elevated INR; S/p Vitk 10mg QD X 3 Days (Ended 4/19): INR stable   - Transfuse for hg < 7.0 and platelets < 10k, < 20k if febrile and < 50k if bleeding (Heme Reccs)

## 2024-05-01 NOTE — PROGRESS NOTE ADULT - PROBLEM SELECTOR PLAN 6
- Ademalog dcd and changed to Regular Insulin   - Continue ISS   - Monitor BGMs - Continue lantus 12 units at 1800  - Continue regular insulin 11 units at 6AM, 9 units at 12PM,  7 units at 6PM,   - Continue to check blood glucose 4 x daily   - Followup with Dr Ruth: [facility]: [address] [place] [address]. [address]. [place] [address]. Tel: [phone]

## 2024-05-01 NOTE — PROGRESS NOTE ADULT - ASSESSMENT
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 if drainage stops leaking on the skin around the tube and starts leaking from the suction port, this would suggest gastroparesis and drainage could be collected in a drainage bag.      I discussed this plan with her  (at bedside) and her daughter by phone).  care coordinated with RCU NP. we discussed my concern for diabetic gastroparesis and plan to keep suction port of PEG-J open.

## 2024-05-01 NOTE — PROGRESS NOTE ADULT - PROBLEM SELECTOR PLAN 5
- LDL goal <70  - Last LDL: 63 mg/dL (04-04-24)  - consider mod dose statin if no contraindication  - check lipid panel as outpatient on a yearly basis

## 2024-05-01 NOTE — PROGRESS NOTE ADULT - NSPROGADDITIONALINFOA_GEN_ALL_CORE
-Plan discussed with pt daughter /team.  Contact info: [phone] (24/7). pager [phone]  Angelika on [employer]  Teams on M-T-W-F. Unavailable Thu/Weekends/Holidays  Assessed pt/labs/meds and discussed plan of care with primary team  Adjusting insulin  Discharge plan  Follow up care

## 2024-05-01 NOTE — PROGRESS NOTE ADULT - ASSESSMENT
Impression:    Sacral/bilateral Buttocks stage 4 pressure injury present on admission  Left ischium healed stage 4 pressure injury present on admission  Incontinence of bowel and bladder  Incontinence Dermatitis    Recommend:  1.) topical therapy: sacral/bilateral buttocks wound - cleanse with NS, pat dry, apply somtahesive powder, apply stomahesive paste on the wound bed, cover with a skin barrier every 3-5 days  Left ischium injury - cleanse with incontinence cleanser, pat dry, apply Triad ointment BID and PRN for incontinent episodes  2.) Incontinence Management - incontinence cleanser, pads, pericare BID  3.) Maintain on an alternating air with low air loss surface  4.) Turn and reposition Q 2 hours  5.) Nutrition optimization - please add Alfred  6.) Offload heels/feet with complete cair air fluidized boots/pillows; ensure that the soles of the feet are not resting on the foot board of the bed.    Care as per medicine. Will not actively follow but will remain available. Please recall for new issues or deterioration.  Upon [facility] [address] [place] [address] [phone]  Thank you for this consult  Discussed with clinical nurse, seen with primary team ACP  Maggie Walton, PRITI-C, CWOCN via TEAMS

## 2024-05-01 NOTE — PROGRESS NOTE ADULT - NS ATTEND AMEND GEN_ALL_CORE FT
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     N: Awake and alert at the time of my exam. Interactive and following. Continue delirium precautions, routine RCU care.   CV: Has been normotensive since admission.   P: Chronic respiratory failure s/p tracheostomy. She continues on full vent support with high support PSV trials as she tolerates. Will continue oral care and suctioning as needed per RCU protocol. She has duonebs ordered for every 6 hours.   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 She is s/p EGD assisted sutured closer of gastrocutaneous fistula and GJ placement (4/16) with surgery. She is tolerating tube feeds without issue. Her daughter is requesting surgical evaluation of granulation tissue prior to discharge by surgery. She has continued leakage from her wound. Will ask surgery to come back and provide recommendations prior to discharge.   She has had issues with ongoing diarrhea in the setting of C.Diff and tube feeding. She has a rectal tube in place. We have added banatrol, and will add Bismuth as per ID recs to help form stool. Her stool is more formed. Will plan to DC rectal tube.  s/p FEEST, strict recommendations for pleasure feeding. The family has requested additional guidance from dietary in terms of supplements and feeding consistency recommendations. We will ensure they have communicated with family prior to discharge.  R: Creatinine within normal range, making good urine. She has no emerson in place, using external collection device. Will continue to monitor clinically   ID: We continue to monitor her off antibiotics (s/p full course for Serratia bacteremia) other than PO vancomycin taper for recurrent episode C.diff. Plan for dose of bezlotoxumab as an outpatient after discharge. Dr. Newman is following and has been communicating closely with family.  Endo: She has a history of hypothyroidism, and diabetes. We appreciate endocrinology input.  Heme: The patient has a history of chronic anemia. [place] saw the patient during the admission and provided the following recommendation: Transfuse for hg < 7.0 and platelets < 10k, < 20k if febrile and < 50k if bleeding. However, primary team can consider transfusion goal hg < 8.0 if patient has symptoms of anemia, or during sepsis.  MSK: I evaluated her sacral wound on exam today. Based on evaluations previously, her sacral wound appears to be overall improving. SHe has wound care set up for post-discharge continued evaluation and management.     Dispo planning underway. Await additional supplies and services to be reinstated. Updated daughter Marysol over the phone. She is asking for additional documentation for 24 hour RN care in addition to LPN. I have read through the criteria and confirmed with case management, social work and the head of social work for the PDN care. Although certainly would benefit from 24 hour care, she does not specifically qualify for RN assessments at this time. I explained this to Marysol over the phone. I would be happy to provide additional documentation as appropriate to help assist Ms. Woods get the care she needs. 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     N: Awake and alert at the time of my exam. Interactive and following. Continue delirium precautions, routine RCU care.   CV: Has been normotensive since admission.   P: Chronic respiratory failure s/p tracheostomy. She continues on full vent support with high support PSV trials as she tolerates. Will continue oral care and suctioning as needed per RCU protocol. She has duonebs ordered for every 6 hours.   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 She is s/p EGD assisted sutured closer of gastrocutaneous fistula and GJ placement (4/16) with surgery. She is tolerating tube feeds without issue. She has continued leakage from her original PEG site likely from prolonged would healing (chronic prednisone, chronically ill) and underlying gastroparesis. Discussed with surgery and GI (who have also spoken with daughter), will attach emerson bag to G port for drainage and can follow up outpatient.  She has had issues with ongoing diarrhea in the setting of C.Diff and tube feeding. She has a rectal tube in place. We have added banatrol, and will add Bismuth to help form stool. Her stool is more formed. Will plan to DC rectal tube.  s/p FEEST, strict recommendations for pleasure feeding. The family has requested additional guidance from dietary in terms of supplements and feeding consistency recommendations. They have communicated with family prior to discharge.  R: Creatinine within normal range, making good urine. She has no emerson in place, using external collection device. Will continue to monitor clinically   ID: We continue to monitor her off antibiotics (s/p full course for Serratia bacteremia) other than PO vancomycin taper for recurrent episode C.diff. Plan for dose of bezlotoxumab as an outpatient after discharge. Dr. Newman is following and has been communicating closely with family.  Endo: She has a history of hypothyroidism, and diabetes. We appreciate endocrinology input.  Heme: The patient has a history of chronic anemia. [place] saw the patient during the admission and provided the following recommendation: Transfuse for hg < 7.0 and platelets < 10k, < 20k if febrile and < 50k if bleeding. However, primary team can consider transfusion goal hg < 8.0 if patient has symptoms of anemia, or during sepsis.  MSK: I evaluated her sacral wound on exam today. Based on evaluations previously, her sacral wound appears to be overall improving. SHe has wound care set up for post-discharge continued evaluation and management.     Dispo planning underway. Planned for discharge today. Updated daughter Marysol over the phone.

## 2024-05-01 NOTE — PROGRESS NOTE ADULT - PROVIDER SPECIALTY LIST ADULT
Gastroenterology
Infectious Disease
Ophthalmology
Pulmonology
Surgery
Surgery
Gastroenterology
Infectious Disease
MICU
Pulmonology
Surgery
Wound Care
Wound Care
Endocrinology
Gastroenterology
Infectious Disease
Pulmonology
Pulmonology
Surgery
Gastroenterology
Infectious Disease
Ophthalmology
Pulmonology
Surgery
ENT
Ophthalmology
Pulmonology
ENT
Endocrinology
Endocrinology
Pulmonology
Pulmonology
Endocrinology
Endocrinology
Pulmonology
Pulmonology
Endocrinology
Pulmonology

## 2024-05-01 NOTE — PROGRESS NOTE ADULT - PROBLEM SELECTOR PLAN 8
- Sacral wound present on admission   - Wound Care team continues to follow  - Frequent turning and repositioning  - Continue Local wound care - sacral/bilateral buttocks wound - cleanse with NS, pat dry, apply somtahesive powder, apply stomahesive paste on the wound bed, cover with a skin barrier every 3-5 days  - Left ischium injury - cleanse with incontinence cleanser, pat dry, apply Triad ointment BID and PRN for incontinent episodes  - Incontinence Management - incontinence cleanser, pads, pericare BID  - Maintain on an alternating air with low air loss surface  - Turn and reposition Q 2 hours  - Nutrition optimization - please add Alfred  - Offload heels/feet with complete air fluidized boots/pillows; ensure that the soles of the feet are not resting on the foot board of the bed

## 2024-05-01 NOTE — PROGRESS NOTE ADULT - NUTRITIONAL ASSESSMENT
This patient has been assessed with a concern for Malnutrition and has been determined to have a diagnosis/diagnoses of Severe protein-calorie malnutrition.    This patient is being managed with:   Diet NPO with Tube Feed-  Tube Feeding Modality: Jejunostomy  Casie Frazier Peptide 1.5 (KFPEPT1.5RTH)  Total Volume for 24 Hours (mL): 990  Continuous  Starting Tube Feed Rate {mL per Hour}: 50  Increase Tube Feed Rate by (mL): 5     Every 24 hours  Until Goal Tube Feed Rate (mL per Hour): 55  Tube Feed Duration (in Hours): 18  Tube Feed Start Time: 06:00  Tube Feed Stop Time: 00:00  Free Water Flush  Pump   Rate (mL per Hour): 20   Frequency: Every 2 Hours  Alfred(7 Gm Arginine/7 Gm Glut/1.2 Gm HMB     Qty per Day:  2  No Carb Prosource TF     Qty per Day:  1  Banatrol TF     Qty per Day:  1  Supplement Feeding Modality:  Gastrostomy  Probiotic Yogurt/Smoothie Cans or Servings Per Day:  1       Frequency:  Two Times a day  Entered: Apr 26 2024  3:38PM  
This patient has been assessed with a concern for Malnutrition and has been determined to have a diagnosis/diagnoses of Severe protein-calorie malnutrition.    This patient is being managed with:   Diet NPO with Tube Feed-  Tube Feeding Modality: Gastrostomy  Casie Frazier Peptide 1.5 (KFPEPT1.5RTH)  Total Volume for 24 Hours (mL): 990  Continuous  Starting Tube Feed Rate {mL per Hour}: 30  Increase Tube Feed Rate by (mL): 10     Every 3 hours  Until Goal Tube Feed Rate (mL per Hour): 55  Tube Feed Duration (in Hours): 18  Tube Feed Start Time: 06:00  Tube Feed Stop Time: 00:00  Free Water Flush  Pump   Rate (mL per Hour): 20   Frequency: Every 2 Hours  Alfred(7 Gm Arginine/7 Gm Glut/1.2 Gm HMB     Qty per Day:  2  Entered: Apr 18 2024 12:58PM  
This patient has been assessed with a concern for Malnutrition and has been determined to have a diagnosis/diagnoses of Severe protein-calorie malnutrition.    This patient is being managed with:   Diet NPO with Tube Feed-  Tube Feeding Modality: Jejunostomy  Casie Karin Peptide 1.5 (KFPEPT1.5RTH)  Total Volume for 24 Hours (mL): 990  Continuous  Starting Tube Feed Rate {mL per Hour}: 50  Increase Tube Feed Rate by (mL): 5     Every 24 hours  Until Goal Tube Feed Rate (mL per Hour): 55  Tube Feed Duration (in Hours): 18  Tube Feed Start Time: 06:00  Tube Feed Stop Time: 00:00  Free Water Flush  Pump   Rate (mL per Hour): 20   Frequency: Every 2 Hours  Alfred(7 Gm Arginine/7 Gm Glut/1.2 Gm HMB     Qty per Day:  2  No Carb Prosource TF     Qty per Day:  1  Entered: Apr 21 2024  9:13AM  
This patient has been assessed with a concern for Malnutrition and has been determined to have a diagnosis/diagnoses of Severe protein-calorie malnutrition.    This patient is being managed with:   Diet NPO with Tube Feed-  Tube Feeding Modality: Jejunostomy  Casie Frazier Peptide 1.5 (KFPEPT1.5RTH)  Total Volume for 24 Hours (mL): 990  Continuous  Starting Tube Feed Rate {mL per Hour}: 50  Increase Tube Feed Rate by (mL): 5     Every 24 hours  Until Goal Tube Feed Rate (mL per Hour): 55  Tube Feed Duration (in Hours): 18  Tube Feed Start Time: 06:00  Tube Feed Stop Time: 00:00  Free Water Flush  Pump   Rate (mL per Hour): 20   Frequency: Every 2 Hours  Alfred(7 Gm Arginine/7 Gm Glut/1.2 Gm HMB     Qty per Day:  2  No Carb Prosource TF     Qty per Day:  1  Banatrol TF     Qty per Day:  1  Supplement Feeding Modality:  Gastrostomy  Probiotic Yogurt/Smoothie Cans or Servings Per Day:  1       Frequency:  Two Times a day  Entered: Apr 26 2024  3:38PM  
This patient has been assessed with a concern for Malnutrition and has been determined to have a diagnosis/diagnoses of Severe protein-calorie malnutrition.    This patient is being managed with:   Diet NPO with Tube Feed-  Tube Feeding Modality: Jejunostomy  Casie Karin Peptide 1.5 (KFPEPT1.5RTH)  Total Volume for 24 Hours (mL): 990  Continuous  Starting Tube Feed Rate {mL per Hour}: 50  Increase Tube Feed Rate by (mL): 5     Every 24 hours  Until Goal Tube Feed Rate (mL per Hour): 55  Tube Feed Duration (in Hours): 18  Tube Feed Start Time: 06:00  Tube Feed Stop Time: 00:00  Free Water Flush  Pump   Rate (mL per Hour): 20   Frequency: Every 2 Hours  Alfred(7 Gm Arginine/7 Gm Glut/1.2 Gm HMB     Qty per Day:  2  No Carb Prosource TF     Qty per Day:  1  Entered: Apr 21 2024  9:13AM  
Diet, NPO with Tube Feed:   Tube Feeding Modality: Jejunostomy  Casie Artabase Peptide 1.5 (KFPEPT1.5RTH)  Total Volume for 24 Hours (mL): 990  Continuous  Starting Tube Feed Rate {mL per Hour}: 50  Increase Tube Feed Rate by (mL): 5     Every 24 hours  Until Goal Tube Feed Rate (mL per Hour): 55  Tube Feed Duration (in Hours): 18  Tube Feed Start Time: 06:00  Tube Feed Stop Time: 00:00  Free Water Flush  Pump   Rate (mL per Hour): 20   Frequency: Every 2 Hours  Alfred(7 Gm Arginine/7 Gm Glut/1.2 Gm HMB     Qty per Day:  2  No Carb Prosource TF     Qty per Day:  1  Banatrol TF     Qty per Day:  1  Supplement Feeding Modality:  Gastrostomy  Probiotic Yogurt/Smoothie Cans or Servings Per Day:  1       Frequency:  Two Times a day (04-26-24 @ 15:39) [Active]
This patient has been assessed with a concern for Malnutrition and has been determined to have a diagnosis/diagnoses of Severe protein-calorie malnutrition.    This patient is being managed with:   Diet NPO with Tube Feed-  Tube Feeding Modality: Jejunostomy  Casie Karin Peptide 1.5 (KFPEPT1.5RTH)  Total Volume for 24 Hours (mL): 880  Continuous  Starting Tube Feed Rate {mL per Hour}: 40  Until Goal Tube Feed Rate (mL per Hour): 40  Tube Feed Duration (in Hours): 22  Tube Feed Start Time: 06:00  Tube Feed Stop Time: 04:00  Free Water Flush  Pump   Rate (mL per Hour): 20   Frequency: Every 2 Hours  Alfred(7 Gm Arginine/7 Gm Glut/1.2 Gm HMB     Qty per Day:  2  Entered: Apr 19 2024  7:14PM  
This patient has been assessed with a concern for Malnutrition and has been determined to have a diagnosis/diagnoses of Severe protein-calorie malnutrition.    This patient is being managed with:   Diet NPO with Tube Feed-  Tube Feeding Modality: Gastrostomy  Casie Karin Peptide 1.5 (KFPEPT1.5RTH)  Total Volume for 24 Hours (mL): 320  Continuous  Starting Tube Feed Rate {mL per Hour}: 20  Until Goal Tube Feed Rate (mL per Hour): 20  Tube Feed Duration (in Hours): 16  Tube Feed Start Time: 06:00  Tube Feed Stop Time: 22:00  Free Water Flush  Pump   Rate (mL per Hour): 20   Frequency: Every 2 Hours  Alfred(7 Gm Arginine/7 Gm Glut/1.2 Gm HMB     Qty per Day:  2  Entered: Apr 17 2024  9:24AM  
This patient has been assessed with a concern for Malnutrition and has been determined to have a diagnosis/diagnoses of Severe protein-calorie malnutrition.    This patient is being managed with:   Diet NPO with Tube Feed-  Tube Feeding Modality: Jejunostomy  Casie Frazier Peptide 1.5 (KFPEPT1.5RTH)  Total Volume for 24 Hours (mL): 990  Continuous  Starting Tube Feed Rate {mL per Hour}: 50  Increase Tube Feed Rate by (mL): 5     Every 24 hours  Until Goal Tube Feed Rate (mL per Hour): 55  Tube Feed Duration (in Hours): 18  Tube Feed Start Time: 06:00  Tube Feed Stop Time: 00:00  Free Water Flush  Pump   Rate (mL per Hour): 20   Frequency: Every 2 Hours  Alfred(7 Gm Arginine/7 Gm Glut/1.2 Gm HMB     Qty per Day:  2  No Carb Prosource TF     Qty per Day:  1  Banatrol TF     Qty per Day:  1  Supplement Feeding Modality:  Gastrostomy  Probiotic Yogurt/Smoothie Cans or Servings Per Day:  1       Frequency:  Two Times a day  Entered: Apr 26 2024  3:38PM  
This patient has been assessed with a concern for Malnutrition and has been determined to have a diagnosis/diagnoses of Severe protein-calorie malnutrition.    This patient is being managed with:   Diet NPO with Tube Feed-  Tube Feeding Modality: Jejunostomy  Casie Frazier Peptide 1.5 (KFPEPT1.5RTH)  Total Volume for 24 Hours (mL): 990  Continuous  Starting Tube Feed Rate {mL per Hour}: 50  Increase Tube Feed Rate by (mL): 5     Every 24 hours  Until Goal Tube Feed Rate (mL per Hour): 55  Tube Feed Duration (in Hours): 18  Tube Feed Start Time: 06:00  Tube Feed Stop Time: 00:00  Free Water Flush  Pump   Rate (mL per Hour): 20   Frequency: Every 2 Hours  Alfred(7 Gm Arginine/7 Gm Glut/1.2 Gm HMB     Qty per Day:  2  No Carb Prosource TF     Qty per Day:  1  Banatrol TF     Qty per Day:  1  Entered: Apr 24 2024  2:06PM  
This patient has been assessed with a concern for Malnutrition and has been determined to have a diagnosis/diagnoses of Severe protein-calorie malnutrition.    This patient is being managed with:   Diet NPO with Tube Feed-  Tube Feeding Modality: Jejunostomy  Casie Karin Peptide 1.5 (KFPEPT1.5RTH)  Total Volume for 24 Hours (mL): 950  Continuous  Starting Tube Feed Rate {mL per Hour}: 50  Increase Tube Feed Rate by (mL): 5     Every 24 hours  Until Goal Tube Feed Rate (mL per Hour): 50  Tube Feed Duration (in Hours): 19  Tube Feed Start Time: 06:00  Tube Feed Stop Time: 19:00  Free Water Flush  Pump   Rate (mL per Hour): 20   Frequency: Every 2 Hours  Alfred(7 Gm Arginine/7 Gm Glut/1.2 Gm HMB     Qty per Day:  2  No Carb Prosource TF     Qty per Day:  1  Entered: Apr 20 2024 11:04AM  
This patient has been assessed with a concern for Malnutrition and has been determined to have a diagnosis/diagnoses of Severe protein-calorie malnutrition.    This patient is being managed with:   Diet NPO with Tube Feed-  Tube Feeding Modality: Jejunostomy  Casie Frazeir Peptide 1.5 (KFPEPT1.5RTH)  Total Volume for 24 Hours (mL): 990  Continuous  Starting Tube Feed Rate {mL per Hour}: 50  Increase Tube Feed Rate by (mL): 5     Every 24 hours  Until Goal Tube Feed Rate (mL per Hour): 55  Tube Feed Duration (in Hours): 18  Tube Feed Start Time: 06:00  Tube Feed Stop Time: 00:00  Free Water Flush  Pump   Rate (mL per Hour): 20   Frequency: Every 2 Hours  Alfred(7 Gm Arginine/7 Gm Glut/1.2 Gm HMB     Qty per Day:  2  No Carb Prosource TF     Qty per Day:  1  Banatrol TF     Qty per Day:  1  Entered: Apr 24 2024  2:06PM  
This patient has been assessed with a concern for Malnutrition and has been determined to have a diagnosis/diagnoses of Severe protein-calorie malnutrition.    This patient is being managed with:   Diet NPO with Tube Feed-  Tube Feeding Modality: Jejunostomy  Casie Karin Peptide 1.5 (KFPEPT1.5RTH)  Total Volume for 24 Hours (mL): 950  Continuous  Starting Tube Feed Rate {mL per Hour}: 50  Increase Tube Feed Rate by (mL): 5     Every 24 hours  Until Goal Tube Feed Rate (mL per Hour): 50  Tube Feed Duration (in Hours): 19  Tube Feed Start Time: 06:00  Tube Feed Stop Time: 19:00  Free Water Flush  Pump   Rate (mL per Hour): 20   Frequency: Every 2 Hours  Alfred(7 Gm Arginine/7 Gm Glut/1.2 Gm HMB     Qty per Day:  2  No Carb Prosource TF     Qty per Day:  1  Entered: Apr 20 2024 11:04AM  
This patient has been assessed with a concern for Malnutrition and has been determined to have a diagnosis/diagnoses of Severe protein-calorie malnutrition.    This patient is being managed with:   Diet NPO with Tube Feed-  Tube Feeding Modality: Jejunostomy  Casie Frazier Peptide 1.5 (KFPEPT1.5RTH)  Total Volume for 24 Hours (mL): 990  Continuous  Starting Tube Feed Rate {mL per Hour}: 50  Increase Tube Feed Rate by (mL): 5     Every 24 hours  Until Goal Tube Feed Rate (mL per Hour): 55  Tube Feed Duration (in Hours): 18  Tube Feed Start Time: 06:00  Tube Feed Stop Time: 00:00  Free Water Flush  Pump   Rate (mL per Hour): 20   Frequency: Every 2 Hours  Alfred(7 Gm Arginine/7 Gm Glut/1.2 Gm HMB     Qty per Day:  2  No Carb Prosource TF     Qty per Day:  1  Banatrol TF     Qty per Day:  1  Supplement Feeding Modality:  Gastrostomy  Probiotic Yogurt/Smoothie Cans or Servings Per Day:  1       Frequency:  Two Times a day  Entered: Apr 26 2024  3:38PM

## 2024-05-01 NOTE — PROGRESS NOTE ADULT - ASSESSMENT
#Gastrostomy malfunction/buried bumper  s/p PEG-J placement 4/16   tolerating feeds  ongoing surgery care appreciated; agree w/placing emerson bag to collect excess drainage (not feed related)  cont banatrol to tube feeds (can mix directly into the feed)    #Recurrent C.diff   per ID   cont bacid      d/c planning in progress

## 2024-05-01 NOTE — PROGRESS NOTE ADULT - ASSESSMENT
75yo woman  h/o T2DM (4/4/24: A1C = 6.2%), HTN, HLD, anemia, hypothyroidism, RA, fibromyalgia, remote cerebral aneurysm repair, acute hypercapnic respiratory failure with tracheostomy, PEG tube (initially placed 2022), and bedbound, recurrent C diff presented for PEG tube dislodgment. Admitted 4/2/24  weight = 54.5 kg    Recurrent C diff - first episode Aug 2023 treated with tapering PO vanco, recurrent episode 1/31/24 treated with PO vanco and then fidaxomicin completed in Feb - most recently tested positive 3/20/24 seen by Dr. Newman 3/29 outpatient, started on fidaxomicin that day - tube feeds concurrently held, unclear if improving afterwards per family  Chronic sacral decubitus wound  3/20 Klebisella bacteruria R only to amp and sputum few Pseudomonas R to FQs w/o polys on gram stian - treatment of urine was deferred to avoid exacerbating C diff    Tmax 100, no leukocytosis  Concern for cellulitis around PEG tube site - area with very minimal erythema, remarkable receded from skin norm placed on admission  UA 11 WBC  CT a/p: no infectious focus or colitis  MRSA PCR+    4/3 IR - PEG exchanged bedside to 20 Fr Kangaroo EnFit  - Bedside XR demonstrates appropriately positioned G-tube with contrast filling into the stomach and bowel.    4/2 Blood Cultures x 2 NGTD;  Positive MRSA/MSSA PCR  4/3 Urine cultures NEG  4/4 fever  4/4 Wound care assessment appreciated:   " area of persistent nonblanchable dark discoloration that is inconsistent with a patient's surrounding skin tone as well as a white juan j film noted over B/L buttocks/sacral skin, area measures approximately 10cm x 10cm x 0cm- presentation is consistent with a deep tissue injury in evolution with incontinence and fungal involvement present on admission. Within the apex of the gluteal cleft/base of sacrum there is full thickness skin loss with red, beefy tissue noted, area measures approximately 3cm x 1cm x 0.3cm- presentation is consistent with a deep tissue injury in evolution with incontinence involvement present on admission."  4/7 fever; Meropenem resumed  Positive Blood Culture x1 Serratia marcesens    4/16 OR: Gastrojejunostomy, percutaneous, endoscopic, Endoscopic assisted closure of PEG site. Endoscopic assisted percutaneous gastrojejunostomy tube placement.   4/19 K= 4.4  4/22 feeds @ 55 cc/hr  4/24 continued frequent bowel movements  - stool partially formed  4/25 Ophthalmology follow up: anterior scleritis OU  - Pt with hx of RA and sjogrens has bilateral scleral injection that is sectoral, no signs of conjunctivitis; ENT for trach exchange    Antibiotics  Meropenem 4/2 -->4/3; 4/7 --> 4/11  Cefepime 4/11 --> 4/14  IV Vanco 4/2  Fdaxomicin 4/3--> 4/8  PO Vanco 4/8 -->     Suggest:  maintain fluid and electrolytes with increased diarrhea -  Continue po Vanco  Extended po Vancomycin taper  Bezlotoxumab (Zinplava) at end of Vanco course as out pt    Vanco dosing schedule  Vancomycin 125 mg po 4 times daily 4/8 --> 4/18  Vancomycin 125 mg po 2 times daily 4/19 --> 4/25  Vancomycin 125 mg po once daily 4/26 -->5/2  Vancomycin 125 mg po once every other day 5/3 --> 5/9  Vancomycin 125 mg po once every third day 5/10 --> 5/23/24    Given multiple recurrences in context of advanced age, debility and multiple co-morbidities, administration of Bezlotoxumab (Zinplava) 500 mg over 1 hours is indicated to reduce future recurrences  Ref: Norm Wheeler M.D., Rojas Contreras M.D., Scott Griffith, Ph.D., Shiva Abraham M.D., Gabriel Falcon D.O., Pedro Weiss M.D., Talib Hampton M.D., Sena Hughes M.D., Raimundo Marte M.D., Melissa Soria M.D., Piyush Lamas M.D., Eliceo Romero M.D., Neyda Xie M.D., Jude Peters M.D., Neelima Vela B.S.M.T., Hannah Cardenas, Ph.D., Ana Quiroz, B.S., Obie Chirinos, M.S., Khushbu Flores M.D., Jean Carlos Ayala M.D., and Kristin Lopez, Ph.D., for the [employer] I and MOD[employer] II Investigators*    Bezlotoxumab is suggested in IDSA guidelines -  Ref: Clinical Practice Guidelines for the Management of Clostridioides difficile Infection in Adults: 2021 Update by SHEA/IDSA  Published VERNA, 6/14/2021; Clinical Infectious Diseases, [id number], https://doi.org/10.1093/verna/ezaq688    Zinplava to be administered at home at end of vanco taper around 5/23/24    significance of current diarrhea unclear  - Cidff v tube feeds  4/25 routine trach change performed #8 distal XLT cuffed trach  leakage from previous and current peg sites    Suggest  add Florastor, provide Activia yogurt, consider Kaopectate  ( - if increased diarrhea and leukocytosis would increase Vanco back to q 6h)    discussed with daughter-   - discussed with ACP    discharge anticipated today

## 2024-05-01 NOTE — PROGRESS NOTE ADULT - SUBJECTIVE AND OBJECTIVE BOX
afeb    on mechanical vent via trach    on Peptide 1.5 @ 55 mL/hr via PEG-J tube  PEG-J site without evidence of infection  old PEG site is nearly closed. there appears to be a 1-2 mm sinus opening in the skin.  There is a small amount of nearly clear drainage on the gauze under the PEG-J flange which looks like saliva. Unclear if it is coming from the new PEG-J or the old PEG site or both.    WBC = 9 (4/30)

## 2024-05-01 NOTE — PROGRESS NOTE ADULT - SUBJECTIVE AND OBJECTIVE BOX
Follow Up:  c diff    Interval History/ROS:  about 1100 cc per rectal tube ,sleepy    Allergies  pineapple (Unknown)  Tagamet (Unknown)  heparin (Unknown)  walnut (Unknown)  metronidazole (Rash)  penicillin (Unknown)  Lyrica (Unknown)  meropenem (Rash)  Pecan, Filbert, Hazelnut (Unknown)    ANTIMICROBIALS:  vancomycin    Solution 125 daily      OTHER MEDS:  MEDICATIONS  (STANDING):  acetaminophen   Oral Liquid .. 1000 every 6 hours PRN  albuterol/ipratropium for Nebulization 3 every 6 hours  amLODIPine   Tablet 10 <User Schedule>  bismuth subsalicylate Liquid 15 <User Schedule>  escitalopram 10 <User Schedule>  fentaNYL   Patch  25 MICROgram(s)/Hr 1 every 72 hours  gabapentin Solution 100 <User Schedule>  insulin glargine Injectable (LANTUS) 12 <User Schedule>  insulin regular  human corrective regimen sliding scale  <User Schedule>  insulin regular  human recombinant 11 <User Schedule>  insulin regular  human recombinant 9 <User Schedule>  insulin regular  human recombinant 7 <User Schedule>  levothyroxine 125 daily  melatonin 3 at bedtime  ondansetron Injectable 4 every 8 hours PRN  oxyCODONE    Solution 5 every 4 hours PRN  pantoprazole   Suspension 40 daily  predniSONE  Solution 5 <User Schedule>  QUEtiapine 12.5 <User Schedule>  simethicone 80 every 6 hours      Vital Signs Last 24 Hrs  T(C): 36.7 (01 May 2024 06:00), Max: 37.3 (01 May 2024 00:00)  T(F): 98 (01 May 2024 06:00), Max: 99.2 (01 May 2024 00:00)  HR: 76 (01 May 2024 11:31) (70 - 90)  BP: 110/59 (01 May 2024 06:00) (107/55 - 127/57)  BP(mean): --  RR: 16 (01 May 2024 06:00) (16 - 23)  SpO2: 100% (01 May 2024 11:31) (98% - 100%)    Parameters below as of 01 May 2024 11:31  Patient On (Oxygen Delivery Method): ventilator        PHYSICAL EXAM:  General:  NAD, Non-toxic  Neurology: A&Ox3, nonfocal  Respiratory: Clear to auscultation bilaterally  CV: RRR, S1S2, no murmurs, rubs or gallops  Abdominal: Soft, Non-tender, non-distended, normal bowel sounds  Extremities: moderate general edema  Line Sites: Clear  Skin: No rash                          8.8    9.99  )-----------( 190      ( 30 Apr 2024 10:25 )             28.7       04-30    133<L>  |  99  |  15  ----------------------------<  101<H>  3.7   |  20<L>  |  <0.30<L>    Ca    9.0      30 Apr 2024 10:25  Phos  2.6     04-30  Mg     1.8     04-30          MICROBIOLOGY:  .Blood Blood-Peripheral  04-15-24   No growth at 5 days  --  --      .Blood Blood-Peripheral  04-15-24   No growth at 5 days  --  --      Catheterized Catheterized  04-12-24   No growth  --  --      .Blood Blood-Peripheral  04-12-24   No growth at 5 days  --  --      .Blood Blood-Peripheral  04-11-24   No growth at 5 days  --  --      .Blood Blood-Peripheral  04-09-24   No growth at 5 days  --  --      .Sputum Sputum  04-07-24   Mixed gram negative rods Culture includes  Moderate Stenotrophomonas maltophilia  Few Pseudomonas aeruginosa (Carbapenem Resistant)  --  Stenotrophomonas maltophilia  Pseudomonas aeruginosa (Carbapenem Resistant)      .Blood Blood-Peripheral  04-07-24   No growth at 5 days  --  Blood Culture PCR  Serratia marcescens      .Sputum Sputum  04-05-24   Numerous Pseudomonas aeruginosa  Normal Respiratory Shanda present  --  Pseudomonas aeruginosa      .Blood Blood-Peripheral  04-04-24   No growth at 5 days  --  --      Clean Catch Clean Catch (Midstream)  04-03-24   <10,000 CFU/mL Normal Urogenital Shanda  --  --      .Blood Blood-Peripheral  04-02-24   No growth at 5 days  --  --        RADIOLOGY:    Zion Newman MD; Division of Infectious Disease; Pager: [phone]; nights and weekends: [phone]

## 2024-05-01 NOTE — PROGRESS NOTE ADULT - PROBLEM SELECTOR PLAN 9
- Code Status: DNR  - FEES 4/23: Recc for comfort feeds with parameters (Puree / Thin Liquids)   - Daughter/ Pts  involved in plan of care

## 2024-05-01 NOTE — PROGRESS NOTE ADULT - NS ATTEND BILL GEN_ALL_CORE
Attending to bill
no

## 2024-05-01 NOTE — PROGRESS NOTE ADULT - PROBLEM SELECTOR PLAN 1
- Patient p/w PEG tube dislodgement on admission   - CT A/P 4/2: Dislodged G tube with balloon in the anterior abdominal wall with air filled track to stomach  - S/p bedside exchange by IR on 4/3 ( # 20 Fr Kangaroo EnFit placed )  - 4/4: PEG leaking; IR adjusted PEG at bedside  - 4/5: PEG still leaking, adjusted at bedside by IR Attending.  - 4/8-4/9 IR aborted procedure; GI Dr. Cleaning consulted for new PEG  - 4/9 PEG fell out spontaneously, Mcbride placed into tract   - 4/12: Mcbride replaced with 18Fr PEG at bedside, bumper at 8cm (intentionally loose)  - CT A/P 4/12: G-tube in the stomach/Mild inflammation thickening along tract   - 4/14 18 fr PEG was used, feeds were re-initiated, attempt failed, PEG with copious amounts of leakage  - 4/16 S/p new PEG-J and closure of gastric fistula in endo suite with GI and surgery  - old stoma site with persistent amounts of clear liquid leakage - surgery has been following, continue to monitor, no intervention necessary - ABD pads applied to site for absorption of liquid  - tolerating feeds at Goal Rate

## 2024-05-01 NOTE — PROGRESS NOTE ADULT - NS ATTEND OPT1 GEN_ALL_CORE
I attest my time as attending is greater than 50% of the total combined time spent on qualifying patient care activities by the PA/NP and attending.
I independently performed the documented:
I attest my time as attending is greater than 50% of the total combined time spent on qualifying patient care activities by the PA/NP and attending.
I attest my time as attending is greater than 50% of the total combined time spent on qualifying patient care activities by the PA/NP and attending.
I independently performed the documented:
I attest my time as attending is greater than 50% of the total combined time spent on qualifying patient care activities by the PA/NP and attending.
I independently performed the documented:
I attest my time as attending is greater than 50% of the total combined time spent on qualifying patient care activities by the PA/NP and attending.
I independently performed the documented:
I attest my time as attending is greater than 50% of the total combined time spent on qualifying patient care activities by the PA/NP and attending.
I independently performed the documented:
I attest my time as attending is greater than 50% of the total combined time spent on qualifying patient care activities by the PA/NP and attending.
I independently performed the documented:
I attest my time as attending is greater than 50% of the total combined time spent on qualifying patient care activities by the PA/NP and attending.
I independently performed the documented:

## 2024-05-01 NOTE — PROGRESS NOTE ADULT - SUBJECTIVE AND OBJECTIVE BOX
DIABETES FOLLOW UP NOTE: Saw pt earlier today    Chief Complaint: Endocrine consult requested for management of T2DM    INTERVAL HX: Pt stable, able to nod yes/no to questions. Denies pain and says yes when asked if feeling better. Pt tolerating 18h TFs of [place] Peptide 1.5  at 55cc/hr with BG high 100s to 300 while on present insulin doses. No hypoglycemia. On chronic Prednisone 5mg day       Review of Systems:  General: As above. Nods  Cardiovascular: No chest pain, palpitations  Respiratory: No SOB, no cough  GI: No nausea, vomiting, abdominal pain  Endocrine: No S&Sx of hypoglycemia    Allergies    pineapple (Unknown)  Tagamet (Unknown)  heparin (Unknown)  walnut (Unknown)  metronidazole (Rash)  penicillin (Unknown)  Lyrica (Unknown)  meropenem (Rash)  Pecan, Filbert, Hazelnut (Unknown)    Intolerances      MEDICATIONS    insulin glargine Injectable (LANTUS) 12 Unit(s) SubCutaneous <User Schedule>  insulin regular  human corrective regimen sliding scale   SubCutaneous <User Schedule>  insulin regular  human recombinant 7 Unit(s) SubCutaneous <User Schedule>  insulin regular  human recombinant 11 Unit(s) SubCutaneous <User Schedule>  insulin regular  human recombinant 9 Unit(s) SubCutaneous <User Schedule>  levothyroxine 125 MICROGram(s) Oral daily  predniSONE  Solution 5 milliGRAM(s) Oral <User Schedule>  vancomycin    Solution 125 milliGRAM(s) Enteral Tube daily      PHYSICAL EXAM:  VITALS: T(C): 36.9 (05-01-24 @ 18:00)  T(F): 98.4 (05-01-24 @ 18:00), Max: 99.2 (05-01-24 @ 00:00)  HR: 82 (05-01-24 @ 18:00) (70 - 88)  BP: 119/61 (05-01-24 @ 18:00) (110/59 - 132/59)  RR:  (16 - 20)  SpO2:  (99% - 100%)  Wt(kg): --  GENERAL: Female laying in bed in NAD.  at bedside. Spoke to daughter over the phone  HEENT: Trach on place  Abdomen: Soft, nontender, non distended. PEG in place with TFs going at 55cc/hr at time of visit  Extremities: Warm, no edema in all 4 exts  NEURO: A&O X3    LABS:  POCT Blood Glucose.: 300 mg/dL (05-01-24 @ 17:52)  POCT Blood Glucose.: 172 mg/dL (05-01-24 @ 11:22)  POCT Blood Glucose.: 137 mg/dL (05-01-24 @ 06:03)  POCT Blood Glucose.: 152 mg/dL (04-30-24 @ 23:58)  POCT Blood Glucose.: 278 mg/dL (04-30-24 @ 17:47)  POCT Blood Glucose.: 193 mg/dL (04-30-24 @ 11:42)  POCT Blood Glucose.: 124 mg/dL (04-30-24 @ 06:04)  POCT Blood Glucose.: 181 mg/dL (04-29-24 @ 23:32)  POCT Blood Glucose.: 235 mg/dL (04-29-24 @ 17:46)  POCT Blood Glucose.: 221 mg/dL (04-29-24 @ 11:57)  POCT Blood Glucose.: 93 mg/dL (04-29-24 @ 05:20)  POCT Blood Glucose.: 184 mg/dL (04-28-24 @ 23:27)                            8.8    9.99  )-----------( 190      ( 30 Apr 2024 10:25 )             28.7       04-30    133<L>  |  99  |  15  ----------------------------<  101<H>  3.7   |  20<L>  |  <0.30<L>    eGFR: 113    Ca    9.0      04-30  Mg     1.8     04-30  Phos  2.6     04-30    TPro  7.1  /  Alb  3.1<L>  /  TBili  0.5  /  DBili  x   /  AST  92<H>  /  ALT  77<H>  /  AlkPhos  104  04-29    Thyroid Function Tests:  04-29 @ 11:01 TSH 0.84 FreeT4 0.9 T3 -- Anti TPO -- Anti Thyroglobulin Ab -- TSI --  04-23 @ 04:35 TSH 0.13 FreeT4 -- T3 -- Anti TPO -- Anti Thyroglobulin Ab -- TSI --      A1C with Estimated Average Glucose Result: 6.2 % (04-04-24 @ 07:40)      Estimated Average Glucose: 131 mg/dL (04-04-24 @ 07:40)      04-04 Chol 130 Direct LDL -- LDL calculated 63 HDL 20<L> Trig 295<H>               No

## 2024-05-01 NOTE — PROGRESS NOTE ADULT - SUBJECTIVE AND OBJECTIVE BOX
INTERVAL HPI/OVERNIGHT EVENTS:    events noted   lengthy discussion w/daughter and team over the phone w/ in room     MEDICATIONS  (STANDING):  albuterol/ipratropium for Nebulization 3 milliLiter(s) Nebulizer every 6 hours  amLODIPine   Tablet 10 milliGRAM(s) Oral <User Schedule>  artificial tears (preservative free) Ophthalmic Solution 1 Drop(s) Both EYES every 2 hours  ascorbic acid 500 milliGRAM(s) Oral daily  Biotene Dry Mouth Oral Rinse 5 milliLiter(s) Swish and Spit every 6 hours  bismuth subsalicylate Liquid 15 milliLiter(s) Oral <User Schedule>  chlorhexidine 0.12% Liquid 15 milliLiter(s) Oral Mucosa every 12 hours  chlorhexidine 4% Liquid 1 Application(s) Topical <User Schedule>  cholecalciferol 2000 Unit(s) Oral daily  diclofenac sodium 1% Gel 4 Gram(s) Topical four times a day  erythromycin   Ointment 1 Application(s) Both EYES at bedtime  escitalopram 10 milliGRAM(s) Oral <User Schedule>  fentaNYL   Patch  25 MICROgram(s)/Hr 1 Patch Transdermal every 72 hours  gabapentin Solution 100 milliGRAM(s) Oral <User Schedule>  insulin glargine Injectable (LANTUS) 12 Unit(s) SubCutaneous <User Schedule>  insulin regular  human corrective regimen sliding scale   SubCutaneous <User Schedule>  insulin regular  human recombinant 7 Unit(s) SubCutaneous <User Schedule>  insulin regular  human recombinant 11 Unit(s) SubCutaneous <User Schedule>  insulin regular  human recombinant 9 Unit(s) SubCutaneous <User Schedule>  lactobacillus acidophilus 1 Tablet(s) Oral daily  levothyroxine 125 MICROGram(s) Oral daily  lidocaine   4% Patch 1 Patch Transdermal at bedtime  lidocaine   4% Patch 1 Patch Transdermal at bedtime  melatonin 3 milliGRAM(s) Oral at bedtime  multivitamin/minerals/iron Oral Solution (CENTRUM) 15 milliLiter(s) Oral daily  nystatin Ointment 1 Application(s) Topical every 12 hours  nystatin Powder 1 Application(s) Topical two times a day  pantoprazole   Suspension 40 milliGRAM(s) Oral daily  prednisoLONE acetate 1% Suspension 1 Drop(s) Both EYES four times a day  predniSONE  Solution 5 milliGRAM(s) Oral <User Schedule>  QUEtiapine 12.5 milliGRAM(s) Oral <User Schedule>  simethicone 80 milliGRAM(s) Chew every 6 hours  surgical lubricant sterile 1 Application(s) Topical every 8 hours  vancomycin    Solution 125 milliGRAM(s) Enteral Tube daily    MEDICATIONS  (PRN):  acetaminophen   Oral Liquid .. 1000 milliGRAM(s) Enteral Tube every 6 hours PRN Temp greater or equal to 38C (100.4F), Mild Pain (1 - 3)  calamine/zinc oxide Lotion 1 Application(s) Topical daily PRN Rash and/or Itching  ondansetron Injectable 4 milliGRAM(s) IV Push every 8 hours PRN Nausea and/or Vomiting  oxyCODONE    Solution 5 milliGRAM(s) Enteral Tube every 4 hours PRN Moderate Pain (4 - 6)  sodium chloride 0.65% Nasal 1 Spray(s) Both Nostrils every 12 hours PRN Congestion      Allergies    pineapple (Unknown)  Tagamet (Unknown)  heparin (Unknown)  walnut (Unknown)  metronidazole (Rash)  penicillin (Unknown)  Lyrica (Unknown)  meropenem (Rash)  Pecan, Filbert, Hazelnut (Unknown)    Intolerances        Review of Systems:    General:  No wt loss, fevers, chills, night sweats, fatigue   Eyes:  Good vision, no reported pain  ENT:  No sore throat, pain, runny nose, dysphagia  CV:  No pain, palpitations, hypo/hypertension  Resp:  No dyspnea, cough, tachypnea, wheezing  GI:  No pain, No nausea, No vomiting, No diarrhea, No constipation, No weight loss, No fever, No pruritis, No rectal bleeding, No melena, No dysphagia  :  No pain, bleeding, incontinence, nocturia  Muscle:  No pain, weakness  Neuro:  No weakness, tingling, memory problems  Psych:  No fatigue, insomnia, mood problems, depression  Endocrine:  No polyuria, polydypsia, cold/heat intolerance  Heme:  No petechiae, ecchymosis, easy bruisability  Skin:  No rash, tattoos, scars, edema      Vital Signs Last 24 Hrs  T(C): 36.7 (01 May 2024 06:00), Max: 37.3 (01 May 2024 00:00)  T(F): 98 (01 May 2024 06:00), Max: 99.2 (01 May 2024 00:00)  HR: 76 (01 May 2024 11:31) (70 - 90)  BP: 110/59 (01 May 2024 06:00) (107/55 - 127/57)  BP(mean): --  RR: 16 (01 May 2024 06:00) (16 - 23)  SpO2: 100% (01 May 2024 11:31) (98% - 100%)    Parameters below as of 01 May 2024 11:31  Patient On (Oxygen Delivery Method): ventilator        PHYSICAL EXAM:    Constitutional: NAD  HEENT: EOMI, throat clear  Neck: No LAD, supple  Respiratory: CTA and P  Cardiovascular: S1 and S2, RRR, no M  Gastrointestinal: BS+, soft, NT/ND, neg HSM,  Extremities: No peripheral edema, neg clubbing, cyanosis  Vascular: 2+ peripheral pulses  Neurological: A/O   Psychiatric: Normal mood, normal affect  Skin: No rashes      LABS:                        8.8    9.99  )-----------( 190      ( 30 Apr 2024 10:25 )             28.7     04-30    133<L>  |  99  |  15  ----------------------------<  101<H>  3.7   |  20<L>  |  <0.30<L>    Ca    9.0      30 Apr 2024 10:25  Phos  2.6     04-30  Mg     1.8     04-30        Urinalysis Basic - ( 30 Apr 2024 10:25 )    Color: x / Appearance: x / SG: x / pH: x  Gluc: 101 mg/dL / Ketone: x  / Bili: x / Urobili: x   Blood: x / Protein: x / Nitrite: x   Leuk Esterase: x / RBC: x / WBC x   Sq Epi: x / Non Sq Epi: x / Bacteria: x        RADIOLOGY & ADDITIONAL TESTS:

## 2024-05-02 ENCOUNTER — NON-APPOINTMENT (OUTPATIENT)
Age: 74
End: 2024-05-02

## 2024-05-02 NOTE — DISCHARGE NOTE NURSING/CASE MANAGEMENT/SOCIAL WORK - NSPROMEDSBROUGHTTOHOSP_GEN_A_NUR
Writer spoke with Dali Frances re to Dr. Segundo recommendation.  She states that [facility] needs to call Dr. Segundo for recommendation.  Writer called Dr. Segundo, message left to return call to [facility].     no

## 2024-05-03 PROBLEM — A04.72 ENTEROCOLITIS DUE TO CLOSTRIDIUM DIFFICILE, NOT SPECIFIED AS RECURRENT: Chronic | Status: ACTIVE | Noted: 2024-04-02

## 2024-05-03 PROBLEM — A49.1 STREPTOCOCCAL INFECTION, UNSPECIFIED SITE: Chronic | Status: ACTIVE | Noted: 2024-04-02

## 2024-05-03 PROBLEM — A49.8 OTHER BACTERIAL INFECTIONS OF UNSPECIFIED SITE: Chronic | Status: ACTIVE | Noted: 2024-04-02

## 2024-05-06 ENCOUNTER — NON-APPOINTMENT (OUTPATIENT)
Age: 74
End: 2024-05-06

## 2024-05-06 ENCOUNTER — APPOINTMENT (OUTPATIENT)
Dept: HOME HEALTH SERVICES | Facility: HOME HEALTH | Age: 74
End: 2024-05-06
Payer: MEDICARE

## 2024-05-06 DIAGNOSIS — K27.9 PEPTIC ULCER, SITE UNSPECIFIED, UNSPECIFIED AS ACUTE OR CHRONIC, W/OUT HEMORRHAGE OR PERFORATION: ICD-10-CM

## 2024-05-06 DIAGNOSIS — Z93.1 GASTROSTOMY STATUS: ICD-10-CM

## 2024-05-06 DIAGNOSIS — R44.1 VISUAL HALLUCINATIONS: ICD-10-CM

## 2024-05-06 DIAGNOSIS — M79.7 FIBROMYALGIA: ICD-10-CM

## 2024-05-06 DIAGNOSIS — M06.9 RHEUMATOID ARTHRITIS, UNSPECIFIED: ICD-10-CM

## 2024-05-06 DIAGNOSIS — I67.1 CEREBRAL ANEURYSM, NONRUPTURED: ICD-10-CM

## 2024-05-06 DIAGNOSIS — M80.00XD AGE-RELATED OSTEOPOROSIS WITH CURRENT PATHOLOGICAL FRACTURE, UNSPECIFIED SITE, SUBSEQUENT ENCOUNTER FOR FRACTURE WITH ROUTINE HEALING: ICD-10-CM

## 2024-05-06 DIAGNOSIS — M54.9 DORSALGIA, UNSPECIFIED: ICD-10-CM

## 2024-05-06 DIAGNOSIS — Z71.89 OTHER SPECIFIED COUNSELING: ICD-10-CM

## 2024-05-06 DIAGNOSIS — E78.5 HYPERLIPIDEMIA, UNSPECIFIED: ICD-10-CM

## 2024-05-06 DIAGNOSIS — Z99.11 DEPENDENCE ON RESPIRATOR [VENTILATOR] STATUS: ICD-10-CM

## 2024-05-06 DIAGNOSIS — M35.00 SICCA SYNDROME, UNSPECIFIED: ICD-10-CM

## 2024-05-06 DIAGNOSIS — E11.40 TYPE 2 DIABETES MELLITUS WITH DIABETIC NEUROPATHY, UNSPECIFIED: ICD-10-CM

## 2024-05-06 DIAGNOSIS — K64.4 RESIDUAL HEMORRHOIDAL SKIN TAGS: ICD-10-CM

## 2024-05-06 DIAGNOSIS — J96.12 CHRONIC RESPIRATORY FAILURE WITH HYPERCAPNIA: ICD-10-CM

## 2024-05-06 DIAGNOSIS — Z87.898 PERSONAL HISTORY OF OTHER SPECIFIED CONDITIONS: ICD-10-CM

## 2024-05-06 DIAGNOSIS — R73.9 HYPERGLYCEMIA, UNSPECIFIED: ICD-10-CM

## 2024-05-06 DIAGNOSIS — I10 ESSENTIAL (PRIMARY) HYPERTENSION: ICD-10-CM

## 2024-05-06 PROCEDURE — 99497 ADVNCD CARE PLAN 30 MIN: CPT

## 2024-05-06 PROCEDURE — 99345 HOME/RES VST NEW HIGH MDM 75: CPT | Mod: 25

## 2024-05-06 PROCEDURE — G0506: CPT

## 2024-05-06 RX ORDER — PREDNISONE 5 MG/1
5 TABLET ORAL
Refills: 0 | Status: COMPLETED | COMMUNITY
End: 2024-05-06

## 2024-05-07 ENCOUNTER — LABORATORY RESULT (OUTPATIENT)
Age: 74
End: 2024-05-07

## 2024-05-09 ENCOUNTER — NON-APPOINTMENT (OUTPATIENT)
Age: 74
End: 2024-05-09

## 2024-05-09 ENCOUNTER — LABORATORY RESULT (OUTPATIENT)
Age: 74
End: 2024-05-09

## 2024-05-09 LAB
25(OH)D3 SERPL-MCNC: 42.6 NG/ML
BASOPHILS # BLD AUTO: 0.14 K/UL
BASOPHILS NFR BLD AUTO: 1.8 %
EOSINOPHIL # BLD AUTO: 0.2 K/UL
EOSINOPHIL NFR BLD AUTO: 2.6 %
ESTIMATED AVERAGE GLUCOSE: 120 MG/DL
HBA1C MFR BLD HPLC: 5.8 %
HCT VFR BLD CALC: 31.3 %
HGB BLD-MCNC: 9.5 G/DL
LYMPHOCYTES # BLD AUTO: 1.88 K/UL
LYMPHOCYTES NFR BLD AUTO: 23.9 %
MAN DIFF?: NORMAL
MCHC RBC-ENTMCNC: 27.7 PG
MCHC RBC-ENTMCNC: 30.4 GM/DL
MCV RBC AUTO: 91.3 FL
MONOCYTES # BLD AUTO: 0.07 K/UL
MONOCYTES NFR BLD AUTO: 0.9 %
NEUTROPHILS # BLD AUTO: 5.15 K/UL
NEUTROPHILS NFR BLD AUTO: 63.7 %
PLATELET # BLD AUTO: 173 K/UL
RBC # BLD: 3.43 M/UL
RBC # FLD: 21.2 %
TSH SERPL-ACNC: 0.26 UIU/ML
WBC # FLD AUTO: 7.86 K/UL

## 2024-05-10 ENCOUNTER — LABORATORY RESULT (OUTPATIENT)
Age: 74
End: 2024-05-10

## 2024-05-10 ENCOUNTER — NON-APPOINTMENT (OUTPATIENT)
Age: 74
End: 2024-05-10

## 2024-05-13 ENCOUNTER — NON-APPOINTMENT (OUTPATIENT)
Age: 74
End: 2024-05-13

## 2024-05-13 ENCOUNTER — TRANSCRIPTION ENCOUNTER (OUTPATIENT)
Age: 74
End: 2024-05-13

## 2024-05-13 NOTE — SWALLOW BEDSIDE ASSESSMENT ADULT - SWALLOW EVAL: CURRENT DIET
Patient was advised to continue present medications. discussed with the patient medications and laboratory data in detail.  Follow-up with me in 14 weeks or as advised.  If any blood test was ordered please do 1 week prior to next appointment unless advise to get earlier.  If you have any questions please call the office [phone]  
NPO/PEG
NPO IVF

## 2024-05-14 ENCOUNTER — NON-APPOINTMENT (OUTPATIENT)
Age: 74
End: 2024-05-14

## 2024-05-15 DIAGNOSIS — Z79.899 OTHER LONG TERM (CURRENT) DRUG THERAPY: ICD-10-CM

## 2024-05-16 RX ORDER — VANCOMYCIN HYDROCHLORIDE 25 MG/ML
25 KIT ORAL 4 TIMES DAILY
Qty: 8 | Refills: 0 | Status: ACTIVE | COMMUNITY
Start: 2024-05-16 | End: 1900-01-01

## 2024-05-17 ENCOUNTER — NON-APPOINTMENT (OUTPATIENT)
Age: 74
End: 2024-05-17

## 2024-05-17 DIAGNOSIS — E87.1 HYPO-OSMOLALITY AND HYPONATREMIA: ICD-10-CM

## 2024-05-17 LAB
ANION GAP SERPL CALC-SCNC: 11 MMOL/L
BUN SERPL-MCNC: 25 MG/DL
CALCIUM SERPL-MCNC: 8.8 MG/DL
CDIFF BY PCR: NOT DETECTED
CHLORIDE SERPL-SCNC: 94 MMOL/L
CO2 SERPL-SCNC: 20 MMOL/L
CREAT SERPL-MCNC: 0.15 MG/DL
EGFR: 133 ML/MIN/1.73M2
GLUCOSE SERPL-MCNC: 281 MG/DL
HCT VFR BLD CALC: 30.8 %
HGB BLD-MCNC: 9.5 G/DL
MAGNESIUM SERPL-MCNC: 1.8 MG/DL
MCHC RBC-ENTMCNC: 27.7 PG
MCHC RBC-ENTMCNC: 30.8 GM/DL
MCV RBC AUTO: 89.8 FL
PHOSPHATE SERPL-MCNC: 3.2 MG/DL
PLATELET # BLD AUTO: 147 K/UL
POTASSIUM SERPL-SCNC: 4.8 MMOL/L
RBC # BLD: 3.43 M/UL
RBC # FLD: 20 %
SODIUM SERPL-SCNC: 125 MMOL/L
VANCOMYCIN TROUGH SERPL-MCNC: <4 UG/ML
WBC # FLD AUTO: 7.34 K/UL

## 2024-05-22 ENCOUNTER — NON-APPOINTMENT (OUTPATIENT)
Age: 74
End: 2024-05-22

## 2024-05-22 LAB
C DIFF TOX B STL QL CT TISS CULT: NORMAL
Lab: NORMAL

## 2024-05-22 RX ORDER — FIDAXOMICIN 200 MG/1
200 TABLET, FILM COATED ORAL
Qty: 20 | Refills: 0 | Status: DISCONTINUED | COMMUNITY
Start: 2024-05-06 | End: 2024-05-22

## 2024-05-22 RX ORDER — FIDAXOMICIN 200 MG/1
200 TABLET, FILM COATED ORAL TWICE DAILY
Qty: 20 | Refills: 0 | Status: DISCONTINUED | COMMUNITY
Start: 2024-03-30 | End: 2024-05-22

## 2024-05-22 RX ORDER — FIDAXOMICIN 200 MG/5ML
40 GRANULE, FOR SUSPENSION ORAL
Qty: 1 | Refills: 0 | Status: DISCONTINUED | COMMUNITY
Start: 2024-03-29 | End: 2024-05-22

## 2024-05-24 ENCOUNTER — NON-APPOINTMENT (OUTPATIENT)
Age: 74
End: 2024-05-24

## 2024-05-24 ENCOUNTER — TRANSCRIPTION ENCOUNTER (OUTPATIENT)
Age: 74
End: 2024-05-24

## 2024-05-24 DIAGNOSIS — Z00.8 ENCOUNTER FOR OTHER GENERAL EXAMINATION: ICD-10-CM

## 2024-05-24 DIAGNOSIS — Z01.10 ENCOUNTER FOR EXAMINATION OF EARS AND HEARING W/OUT ABNORMAL FINDINGS: ICD-10-CM

## 2024-05-24 LAB
ALBUMIN SERPL ELPH-MCNC: 3 G/DL
ALBUMIN SERPL ELPH-MCNC: 3.1 G/DL
ANION GAP SERPL CALC-SCNC: 13 MMOL/L
ANION GAP SERPL CALC-SCNC: 14 MMOL/L
BUN SERPL-MCNC: 27 MG/DL
BUN SERPL-MCNC: 30 MG/DL
CALCIUM SERPL-MCNC: 8.9 MG/DL
CALCIUM SERPL-MCNC: 9 MG/DL
CHLORIDE SERPL-SCNC: 95 MMOL/L
CHLORIDE SERPL-SCNC: 99 MMOL/L
CO2 SERPL-SCNC: 22 MMOL/L
CO2 SERPL-SCNC: 22 MMOL/L
CREAT SERPL-MCNC: 0.19 MG/DL
CREAT SERPL-MCNC: 0.2 MG/DL
EGFR: 124 ML/MIN/1.73M2
EGFR: 126 ML/MIN/1.73M2
GLUCOSE SERPL-MCNC: 174 MG/DL
GLUCOSE SERPL-MCNC: 277 MG/DL
OSMOLALITY SERPL: 295 MOSMOL/KG
PHOSPHATE SERPL-MCNC: 3.1 MG/DL
PHOSPHATE SERPL-MCNC: 3.4 MG/DL
POTASSIUM SERPL-SCNC: 4 MMOL/L
POTASSIUM SERPL-SCNC: 5.9 MMOL/L
SODIUM SERPL-SCNC: 130 MMOL/L
SODIUM SERPL-SCNC: 134 MMOL/L

## 2024-05-25 ENCOUNTER — TRANSCRIPTION ENCOUNTER (OUTPATIENT)
Age: 74
End: 2024-05-25

## 2024-05-25 ENCOUNTER — NON-APPOINTMENT (OUTPATIENT)
Age: 74
End: 2024-05-25

## 2024-05-25 DIAGNOSIS — R30.0 DYSURIA: ICD-10-CM

## 2024-05-26 ENCOUNTER — TRANSCRIPTION ENCOUNTER (OUTPATIENT)
Age: 74
End: 2024-05-26

## 2024-05-28 ENCOUNTER — NON-APPOINTMENT (OUTPATIENT)
Age: 74
End: 2024-05-28

## 2024-05-29 ENCOUNTER — TRANSCRIPTION ENCOUNTER (OUTPATIENT)
Age: 74
End: 2024-05-29

## 2024-05-29 ENCOUNTER — NON-APPOINTMENT (OUTPATIENT)
Age: 74
End: 2024-05-29

## 2024-05-29 VITALS
DIASTOLIC BLOOD PRESSURE: 68 MMHG | HEART RATE: 86 BPM | TEMPERATURE: 98.2 F | SYSTOLIC BLOOD PRESSURE: 134 MMHG | OXYGEN SATURATION: 99 % | RESPIRATION RATE: 18 BRPM

## 2024-05-29 PROBLEM — E11.40 DIABETIC NEUROPATHY: Status: ACTIVE | Noted: 2024-05-29

## 2024-05-29 PROBLEM — K64.4 EXTERNAL HEMORRHOIDS: Status: ACTIVE | Noted: 2024-05-29

## 2024-05-29 PROBLEM — M54.9 BACK PAIN, ACUTE: Status: RESOLVED | Noted: 2020-10-28 | Resolved: 2024-05-29

## 2024-05-29 PROBLEM — M79.7 FIBROMYALGIA: Status: ACTIVE | Noted: 2024-05-29

## 2024-05-29 PROBLEM — M06.9 RHEUMATOID ARTHRITIS: Status: ACTIVE | Noted: 2020-11-25

## 2024-05-29 PROBLEM — K27.9 PUD (PEPTIC ULCER DISEASE): Status: ACTIVE | Noted: 2024-05-29

## 2024-05-29 PROBLEM — R73.9 STRESS HYPERGLYCEMIA: Status: RESOLVED | Noted: 2024-03-23 | Resolved: 2024-05-29

## 2024-05-29 PROBLEM — Z87.898 HISTORY OF FEVER: Status: RESOLVED | Noted: 2020-11-17 | Resolved: 2024-05-29

## 2024-05-29 PROBLEM — Z87.898 HISTORY OF HALLUCINATIONS: Status: RESOLVED | Noted: 2024-02-12 | Resolved: 2024-05-29

## 2024-05-29 PROBLEM — M35.00 SJOGREN SYNDROME, UNSPECIFIED: Status: ACTIVE | Noted: 2024-05-29

## 2024-05-29 PROBLEM — R44.1 HALLUCINATIONS, VISUAL: Status: ACTIVE | Noted: 2024-02-12

## 2024-05-29 PROBLEM — Z93.1 PEG (PERCUTANEOUS ENDOSCOPIC GASTROSTOMY) STATUS: Status: ACTIVE | Noted: 2024-05-24

## 2024-05-29 PROBLEM — I67.1 ANEURYSM, CEREBRAL: Status: ACTIVE | Noted: 2024-05-29

## 2024-05-29 PROBLEM — Z71.89 ACP (ADVANCE CARE PLANNING): Status: ACTIVE | Noted: 2024-05-29

## 2024-05-29 PROBLEM — E78.5 HLD (HYPERLIPIDEMIA): Status: ACTIVE | Noted: 2024-05-29

## 2024-05-29 PROBLEM — I10 ESSENTIAL (PRIMARY) HYPERTENSION: Status: ACTIVE | Noted: 2024-01-26

## 2024-05-29 PROBLEM — M80.00XD AGE-RELATED OSTEOPOROSIS WITH CURRENT PATHOLOGICAL FRACTURE WITH ROUTINE HEALING, SUBSEQUENT ENCOUNTER: Status: ACTIVE | Noted: 2024-05-29

## 2024-05-29 PROBLEM — J96.12 CHRONIC RESPIRATORY FAILURE WITH HYPERCAPNIA: Status: ACTIVE | Noted: 2024-05-29

## 2024-05-29 PROBLEM — Z99.11 VENTILATOR DEPENDENT: Status: ACTIVE | Noted: 2024-02-11

## 2024-05-29 NOTE — REASON FOR VISIT
[Initial Eval - Existing Diagnosis] : an initial evaluation of an existing diagnosis [Spouse] : spouse [Family Member] : family member [Formal Caregiver] : formal caregiver [Pre-Visit Preparation] : pre-visit preparation was done [Intercurrent Specialty/Sub-specialty Visits] : the patient has intercurrent specialty/sub-specialty visits [FreeTextEntry1] : CHronic hypercapnic respiratory failure s/ trach and chronic peg g-j tube [FreeTextEntry2] : chart review

## 2024-05-29 NOTE — COUNSELING
[Normal Weight - ( BMI  <25 )] : normal weight - ( BMI  <25 ) [Continue diet as tolerated] : continue diet as tolerated based on goals of care [Non - Smoker] : non-smoker [Smoke/CO Detectors] : smoke/CO detectors [Remove clutter and unsafe carpeting to avoid falls] : remove clutter and unsafe carpeting to avoid falls [] : abdominal aortic ultrasound [Decrease hospital use] : decrease hospital use [Minimize unnecessary interventions] : minimize unnecessary interventions [Maintain functional ability] : maintain functional ability [DNR] : Code Status: DNR [Limited] : Treatment Guidelines: Limited [Long Term Intubation] : Intubation: Long term intubation [[facility] Ver[facility] Date: _____] : [facility]last [facility]: [unfilled] [_____] : HCP: [unfilled] [ - New patient with 2 or more chronic conditions; CCM discussed and patient-centered care plan established] : New patient with 2 or more chronic conditions; CCM discussed and patient-centered care plan established

## 2024-05-29 NOTE — CURRENT MEDS
No [Medication and Allergies Reconciled] : medication and allergies reconciled [High Risk Medications Reviewed and Reconciled (Beers Criteria)] : high risk medications reviewed and reconciled [Reviewed patient reported medication adherence from Comprehensive Assessment] : Reviewed patient reported medication adherence from comprehensive assessment [Adherent to medications] : Patient is adherent to medications as prescribed

## 2024-05-29 NOTE — ASSESSMENT
[FreeTextEntry1] : House calls does not manage rectal tubes.   DNR, ok with intubation, feeding tube, ivf, abx, hospitalize, ?dialysis.   podiatry, audiology, SW, nutritionist  patient requires frequent straight catheterizations with 15f for urine samples

## 2024-05-29 NOTE — PHYSICAL EXAM
[No Acute Distress] : no acute distress [Normal Sclera/Conjunctiva] : normal sclera/conjunctiva [EOMI] : extra ocular movement intact [Normal Outer Ear/Nose] : the ears and nose were normal in appearance [Normal Oropharynx] : the oropharynx was normal [No Respiratory Distress] : no respiratory distress [Clear to Auscultation] : lungs were clear to auscultation bilaterally [No Accessory Muscle Use] : no accessory muscle use [Normal Rate] : heart rate was normal  [Regular Rhythm] : with a regular rhythm [Normal S1, S2] : normal S1 and S2 [No Murmurs] : no murmurs heard [No Edema] : there was no peripheral edema [Normal Bowel Sounds] : normal bowel sounds [Non Tender] : non-tender [Soft] : abdomen soft [Not Distended] : not distended [Normal Post Cervical Nodes] : no posterior cervical lymphadenopathy [Normal Anterior Cervical Nodes] : no anterior cervical lymphadenopathy [No Spinal Tenderness] : no spinal tenderness [de-identified] : Nonverbal, bedbound [de-identified] : trached to vent [de-identified] : GJ tube in place [de-identified] : bedbound, diffuse weakness [de-identified] : 2x2x0.2 sacral wound with granulation tissue [de-identified] : unable to assess [de-identified] : unable to assess

## 2024-05-29 NOTE — HISTORY OF PRESENT ILLNESS
[Spouse] : spouse [Family Member] : family member [Formal Caregiver] : formal caregiver [FreeTextEntry1] : hypercapnic respiratory failure s/p trach and peg [FreeTextEntry2] : MAXX LOPEZ is a 74 year F presenting as a new patient to establish care. Accompanied by daughter (provided all the information), spouse, Rn.   Follows with:  Dr Newman - [facility] Endocrinology GI - Dr Morgan Rheumatology [facility] - wound care  Home dental   Active medical problems:  #IDDM2 - following with endo, dexcom monitoring, glipizide 5mg daily, lantus 15u at bedtime. metformin 500mg BID, Novolin inject 16 units at 6am, 15 units at 12pm, 6 units at 6pm #HTN/ HLD - amlodipine 10mg daily, Torpol XL 25mg daily #Hypothyroidism - Synthroid 50mcg daily #RA on prednisone c/b adrenal insufficiency / Sjogren syndrome - prednisone 5mg/ml 5ml daily #Fibromyalgia and diabetic neuropathy - gabapentin 250mg/5ml 2ml at 6am and 2pm, 5ml at bedtime. Lidocaine patches #Osteoporosis with multiple fractures - on prolia injections #cerebral aneurysm 2005 not repaired - monitored #Chronic hypercapnic respiratory failure s/p trach (vent dependent) and chronic PEG G-J tube, suspecting originating factor was covid c/b mycoplasma PNA. C/b MRSA PNA and MRSA bacteremia. - duonebs q6hrs prn #Recurrent C diff infections (follows with Dr Newman) #hemorrhoids #PUD - pantoprazole, simethicone 125mg 4x daily #Hallucinations/delirium - quetiapine 25mg half tablet at bedtime.  #HCM: Ascorbic acid, banatrol plus, calcium acetate, tubs, magnesium, zinc, folic acid, ayush 1 packet BID  Recent hospitalizations: April 2024 - abdominal wall cellulitis at PEG tube site, PEG tube site closed with new stoma created. Required extensive abx course. 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 cataracts from L eye appendectomy tubes tied   Medications: updated in chart Primary Pharmacy: [facility] [address] [facility]   Allergies: pineapples (prickly throat/tongue), walnut (rash) penicillin anaphylaxis (meropenum and bactrum require IV benadryl) Cefalexin ok but needs benadryl with them Flagyl/metronidazole - rash and itching lyrica - palpitations heparin - bleeding (OK with lovenox) Tagamet    Family history: as per chart   Immunizations: Flu - in hospital in Jan 2024 PNA - cant remember covid - x3   Vision: R eye cataracts, difficulty seeing Hearing: Poor - cant hear out of L ear.  Gait/Falls/Assisted devices: completely bed bound, no falls.  Skin: Sacral wound chronic (managed by woundcare - 2x2x0.2), periwound and perianal.  Pain: all over, joints or stomach, wound, throat at trach site Appetite: has been complaining of hunger, getting tube feeds.  BM: explosive diarrhea  Urine: incontinent Sleep: intermittent Mood: pretty good. enjoys her family   Social: Lives with  and daughter/spouse, granddaughter.. daughter, son, and  involved. Another daughter as well who is in [place].  HHA: 24/7 LPN, 24/7 split HHA (until fair hearing) ADL: dependent IADLs: dependent DME: hospital bed, air mattress, vent and backup vent, feeding pump, cough assist, suction machine (portables), nebulizer, candi lift, reclining wheelchair.  Prior/current profession: Seasonal working with "[employer] - [employer] Enjoys: having family around, video calls with anam.   Smoking, alcohol, drugs: none HCP: daughter, son, and

## 2024-05-30 ENCOUNTER — APPOINTMENT (OUTPATIENT)
Dept: INFECTIOUS DISEASE | Facility: CLINIC | Age: 74
End: 2024-05-30
Payer: MEDICARE

## 2024-05-30 ENCOUNTER — APPOINTMENT (OUTPATIENT)
Dept: INFECTIOUS DISEASE | Facility: CLINIC | Age: 74
End: 2024-05-30

## 2024-05-30 ENCOUNTER — NON-APPOINTMENT (OUTPATIENT)
Age: 74
End: 2024-05-30

## 2024-05-30 DIAGNOSIS — A04.72 ENTEROCOLITIS DUE TO CLOSTRIDIUM DIFFICILE, NOT SPECIFIED AS RECURRENT: ICD-10-CM

## 2024-05-30 DIAGNOSIS — R82.71 BACTERIURIA: ICD-10-CM

## 2024-05-30 PROCEDURE — 99443: CPT | Mod: 93

## 2024-05-31 ENCOUNTER — LABORATORY RESULT (OUTPATIENT)
Age: 74
End: 2024-05-31

## 2024-05-31 DIAGNOSIS — B37.31 ACUTE CANDIDIASIS OF VULVA AND VAGINA: ICD-10-CM

## 2024-05-31 RX ORDER — MICONAZOLE NITRATE 200 MG/1
200 SUPPOSITORY VAGINAL
Qty: 5 | Refills: 1 | Status: ACTIVE | COMMUNITY
Start: 2024-05-31 | End: 1900-01-01

## 2024-06-01 ENCOUNTER — TRANSCRIPTION ENCOUNTER (OUTPATIENT)
Age: 74
End: 2024-06-01

## 2024-06-01 ENCOUNTER — NON-APPOINTMENT (OUTPATIENT)
Age: 74
End: 2024-06-01

## 2024-06-02 ENCOUNTER — TRANSCRIPTION ENCOUNTER (OUTPATIENT)
Age: 74
End: 2024-06-02

## 2024-06-03 ENCOUNTER — NON-APPOINTMENT (OUTPATIENT)
Age: 74
End: 2024-06-03

## 2024-06-03 ENCOUNTER — TRANSCRIPTION ENCOUNTER (OUTPATIENT)
Age: 74
End: 2024-06-03

## 2024-06-03 ENCOUNTER — EMERGENCY (EMERGENCY)
Facility: HOSPITAL | Age: 74
LOS: 1 days | Discharge: HOME | End: 2024-06-03
Attending: EMERGENCY MEDICINE | Admitting: EMERGENCY MEDICINE
Payer: MEDICARE

## 2024-06-03 VITALS
OXYGEN SATURATION: 100 % | HEIGHT: 60 IN | HEART RATE: 93 BPM | DIASTOLIC BLOOD PRESSURE: 53 MMHG | RESPIRATION RATE: 18 BRPM | SYSTOLIC BLOOD PRESSURE: 89 MMHG | TEMPERATURE: 98 F

## 2024-06-03 DIAGNOSIS — Z98.890 OTHER SPECIFIED POSTPROCEDURAL STATES: Chronic | ICD-10-CM

## 2024-06-03 DIAGNOSIS — Z93.1 GASTROSTOMY STATUS: Chronic | ICD-10-CM

## 2024-06-03 PROBLEM — Z79.899 ENCOUNTER FOR LONG-TERM CURRENT USE OF MEDICATION: Status: ACTIVE | Noted: 2024-06-03

## 2024-06-03 LAB
ALBUMIN SERPL ELPH-MCNC: 2.7 G/DL
ALBUMIN SERPL ELPH-MCNC: 2.7 G/DL — LOW (ref 3.3–5)
ALP SERPL-CCNC: 55 U/L — SIGNIFICANT CHANGE UP (ref 40–120)
ALT FLD-CCNC: 31 U/L — SIGNIFICANT CHANGE UP (ref 4–33)
ANION GAP SERPL CALC-SCNC: 13 MMOL/L — SIGNIFICANT CHANGE UP (ref 7–14)
ANION GAP SERPL CALC-SCNC: 7 MMOL/L
ANISOCYTOSIS BLD QL: SIGNIFICANT CHANGE UP
AST SERPL-CCNC: 32 U/L — SIGNIFICANT CHANGE UP (ref 4–32)
BASO STIPL BLD QL SMEAR: PRESENT — SIGNIFICANT CHANGE UP
BASOPHILS # BLD AUTO: 0 K/UL — SIGNIFICANT CHANGE UP (ref 0–0.2)
BASOPHILS NFR BLD AUTO: 0 % — SIGNIFICANT CHANGE UP (ref 0–2)
BILIRUB SERPL-MCNC: 0.4 MG/DL — SIGNIFICANT CHANGE UP (ref 0.2–1.2)
BUN SERPL-MCNC: 32 MG/DL — HIGH (ref 7–23)
BUN SERPL-MCNC: 39 MG/DL
CALCIUM SERPL-MCNC: 8.7 MG/DL
CALCIUM SERPL-MCNC: 9 MG/DL — SIGNIFICANT CHANGE UP (ref 8.4–10.5)
CHLORIDE SERPL-SCNC: 102 MMOL/L — SIGNIFICANT CHANGE UP (ref 98–107)
CHLORIDE SERPL-SCNC: 97 MMOL/L
CO2 SERPL-SCNC: 26 MMOL/L — SIGNIFICANT CHANGE UP (ref 22–31)
CO2 SERPL-SCNC: 28 MMOL/L
CREAT SERPL-MCNC: 0.19 MG/DL
CREAT SERPL-MCNC: <0.2 MG/DL — LOW (ref 0.5–1.3)
EGFR: 124 ML/MIN/1.73M2 — SIGNIFICANT CHANGE UP
EGFR: 126 ML/MIN/1.73M2
EOSINOPHIL # BLD AUTO: 0.07 K/UL — SIGNIFICANT CHANGE UP (ref 0–0.5)
EOSINOPHIL NFR BLD AUTO: 0.9 % — SIGNIFICANT CHANGE UP (ref 0–6)
GLUCOSE SERPL-MCNC: 366 MG/DL
GLUCOSE SERPL-MCNC: 96 MG/DL — SIGNIFICANT CHANGE UP (ref 70–99)
HCT VFR BLD CALC: 30.2 % — LOW (ref 34.5–45)
HGB BLD-MCNC: 8.9 G/DL — LOW (ref 11.5–15.5)
IANC: 5.26 K/UL — SIGNIFICANT CHANGE UP (ref 1.8–7.4)
LYMPHOCYTES # BLD AUTO: 0.62 K/UL — LOW (ref 1–3.3)
LYMPHOCYTES # BLD AUTO: 7.8 % — LOW (ref 13–44)
MACROCYTES BLD QL: SIGNIFICANT CHANGE UP
MCHC RBC-ENTMCNC: 28.3 PG — SIGNIFICANT CHANGE UP (ref 27–34)
MCHC RBC-ENTMCNC: 29.5 GM/DL — LOW (ref 32–36)
MCV RBC AUTO: 95.9 FL — SIGNIFICANT CHANGE UP (ref 80–100)
METAMYELOCYTES # FLD: 0.9 % — SIGNIFICANT CHANGE UP (ref 0–1)
MONOCYTES # BLD AUTO: 0.13 K/UL — SIGNIFICANT CHANGE UP (ref 0–0.9)
MONOCYTES NFR BLD AUTO: 1.7 % — LOW (ref 2–14)
MYELOCYTES NFR BLD: 6.1 % — HIGH (ref 0–0)
NEUTROPHILS # BLD AUTO: 6.38 K/UL — SIGNIFICANT CHANGE UP (ref 1.8–7.4)
NEUTROPHILS NFR BLD AUTO: 78.3 % — HIGH (ref 43–77)
NEUTS BAND # BLD: 2.6 % — SIGNIFICANT CHANGE UP (ref 0–6)
PHOSPHATE SERPL-MCNC: 4 MG/DL
PLAT MORPH BLD: NORMAL — SIGNIFICANT CHANGE UP
PLATELET # BLD AUTO: 156 K/UL — SIGNIFICANT CHANGE UP (ref 150–400)
PLATELET COUNT - ESTIMATE: NORMAL — SIGNIFICANT CHANGE UP
POLYCHROMASIA BLD QL SMEAR: SLIGHT — SIGNIFICANT CHANGE UP
POTASSIUM SERPL-MCNC: 4.3 MMOL/L — SIGNIFICANT CHANGE UP (ref 3.5–5.3)
POTASSIUM SERPL-SCNC: 4.3 MMOL/L — SIGNIFICANT CHANGE UP (ref 3.5–5.3)
POTASSIUM SERPL-SCNC: 5.4 MMOL/L
PROT SERPL-MCNC: 6.4 G/DL — SIGNIFICANT CHANGE UP (ref 6–8.3)
RBC # BLD: 3.15 M/UL — LOW (ref 3.8–5.2)
RBC # FLD: 19.9 % — HIGH (ref 10.3–14.5)
RBC BLD AUTO: ABNORMAL
SMUDGE CELLS # BLD: PRESENT — SIGNIFICANT CHANGE UP
SODIUM SERPL-SCNC: 132 MMOL/L
SODIUM SERPL-SCNC: 141 MMOL/L — SIGNIFICANT CHANGE UP (ref 135–145)
VARIANT LYMPHS # BLD: 1.7 % — SIGNIFICANT CHANGE UP (ref 0–6)
WBC # BLD: 7.89 K/UL — SIGNIFICANT CHANGE UP (ref 3.8–10.5)
WBC # FLD AUTO: 7.89 K/UL — SIGNIFICANT CHANGE UP (ref 3.8–10.5)

## 2024-06-03 PROCEDURE — 99285 EMERGENCY DEPT VISIT HI MDM: CPT

## 2024-06-03 RX ORDER — ACETAMINOPHEN 500 MG
1000 TABLET ORAL ONCE
Refills: 0 | Status: COMPLETED | OUTPATIENT
Start: 2024-06-03 | End: 2024-06-03

## 2024-06-03 RX ORDER — KETOROLAC TROMETHAMINE 30 MG/ML
15 SYRINGE (ML) INJECTION ONCE
Refills: 0 | Status: DISCONTINUED | OUTPATIENT
Start: 2024-06-03 | End: 2024-06-03

## 2024-06-03 RX ORDER — SODIUM CHLORIDE 9 MG/ML
1000 INJECTION, SOLUTION INTRAVENOUS ONCE
Refills: 0 | Status: COMPLETED | OUTPATIENT
Start: 2024-06-03 | End: 2024-06-03

## 2024-06-03 RX ADMIN — SODIUM CHLORIDE 1000 MILLILITER(S): 9 INJECTION, SOLUTION INTRAVENOUS at 22:31

## 2024-06-03 RX ADMIN — Medication 400 MILLIGRAM(S): at 22:30

## 2024-06-03 RX ADMIN — Medication 15 MILLIGRAM(S): at 23:27

## 2024-06-03 NOTE — ED ADULT NURSE NOTE - CHIEF COMPLAINT QUOTE
pt brought in by ems from home on a vent complaining of a loose tooth. pt brought directly to room 22.
Private car

## 2024-06-03 NOTE — ED PROVIDER NOTE - PROGRESS NOTE DETAILS
Spoke to dentistry, indicated okay to discharge with chlorhexidine twice daily for 2 weeks.  No antibiotics.  Plan for discharge.  Lauro Vail MD PGY-1 Melania Donis DO PGY-3  Patient signed out to me at 7AM, pending transportation nonemergent ambulance ride home. Had multiple discussions with daughter updating about the plan, discussed with dental who recommended outpatient follow up within 1 week - daughter is to call dental clinic and create an appointment. Daughter expresses understanding about the plan. Additionally patient will do chlorhexidine rinses twice daily.   Delay in transportation escaled to ANM/SW. Transportation here now. Melania Donis DO PGY-3  Patient signed out to me at 7AM, pending transportation nonemergent ambulance ride home. Had multiple discussions with daughter updating about the plan, discussed with dental who recommended outpatient follow up within 1 week - daughter is to call dental clinic and create an appointment. Daughter expresses understanding about the plan. Additionally patient will do chlorhexidine rinses twice daily.   Delay in transportation escaled to ANM/SW. Ambulance here for the past hour, however was taken off the job due to multiple patient questions and request to go to dental clinic. Reviewed dental note and recommendations with patient however she prefers to see dental herself. Reversed discharge, paged dental to speak with daughter. Melania Donis, DO PGY-3  Second EMS crew here to  patient.  Offered patient dental evaluation prior to discharge however daughter with agreement from patient and  declined, Prefer to go home.

## 2024-06-03 NOTE — ED ADULT NURSE REASSESSMENT NOTE - NS ED NURSE REASSESS COMMENT FT1
Report received from day RN: patient resting comfortably in stretcher, breathing even and unlabored. Offers no complaints at this time. Instructed to call for assistance. Stretcher in lowest position, wheels locked, appropriate side rails in place, call bell in reach. Pending dispo

## 2024-06-03 NOTE — ED ADULT TRIAGE NOTE - CHIEF COMPLAINT QUOTE
pt brought in by ems from home on a vent complaining of a loose tooth. pt brought directly to room 22.

## 2024-06-03 NOTE — ED ADULT NURSE NOTE - OBJECTIVE STATEMENT
pt received to 22, awake, responsive, following commands.  pt on chronic vent, settings, rate:12, TV: 350, PEEP: 5, FiO2: 40%.  pt has trach.  pt brought to ED for dental consult for broken tooth.  as per family, pt has had broken teeth for many years but recently, the tooth is cutting the inside of the patients lip.  MD at bedside, awaiting dental consult.  v/s as noted.  pt noted to have purulent drainage from healing/closing peg site.  new peg has been placed and as per family/care giver, the new peg is functioning properly.  report given to primary RN Mayda

## 2024-06-03 NOTE — PROGRESS NOTE ADULT - SUBJECTIVE AND OBJECTIVE BOX
CC: 73y/o presents with daugther with CC of pain in for past few weeks due to loose front lower tooth that has been piercing the inside of her lip causing an ulcer.        Med HX:Dependence on respirator [ventilator] status    Disorder of adrenal gland, unspecified    Dorsalgia, unspecified    Enterocolitis due to Clostridium difficile, not specified as recurrent    Fever, unspecified    Hallucinations, unspecified    Herpesviral infection of other urogenital tract    Hyperglycemia, unspecified    Pneumonia, unspecified organism    Visual hallucinations    Prior    Diabetes    Rheumatoid arthritis    Fibromyalgia    Hypothyroid    Hypertension    Clostridium difficile diarrhea    VRE (vancomycin-resistant Enterococci) infection    Infection with Pseudomonas aeruginosa resistant to multiple drugs    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    Pain, dental    H/O tracheostomy    PEG (percutaneous endoscopic gastrostomy) status    DENTAL    32    SysAdmin_VisitLink        RX:acetaminophen 500 mg/15 mL oral liquid: 30 milliliter(s) by gastrostomy tube every 6 hours as needed for  fever or pain  amLODIPine 10 mg oral tablet: 1 tab(s) by gastrostomy tube once a day  ascorbic acid 500 mg oral tablet: 1 tab(s) by gastrostomy tube once a day  BD Alcohol Single Use Swab 70% topical pad: Apply topically to affected area once a day apply to site as needed  bismuth subsalicylate 262 mg/15 mL oral suspension: 15 milliliter(s) by gastrostomy tube once a day  calamine-zinc oxide 8%-8% topical lotion: Apply topically to affected area once a day  diclofenac 1% topical gel: Apply topically to affected area every 6 hours Apply to both knees  or hands for joint pain  erythromycin 0.5% ophthalmic ointment: 1 application to each affected eye once a day (at bedtime)  escitalopram 10 mg oral tablet: 1 tab(s) by gastrostomy tube once a day MDD: 10mg  fentaNYL 25 mcg/hr transdermal film, extended release: 1 patch transdermally every 72 hours 1 patch per 72 hr MDD: 1 patch per 72 hours  Firvanq 25 mg/mL oral liquid: 25 milliliter(s) by gastrostomy tube once a day Give daily on 4/27 through 5/2  Give every other day on 5/3 through 5/9  Give every 3rd day on 5/10 through 5/23  gabapentin 250 mg/5 mL oral solution: 100 milligram(s) by gastrostomy tube 2 times a day give at 9AM and 9PM MDD: 200mg  guaiFENesin 100 mg/5 mL oral liquid: 10 milliliter(s) by gastrostomy tube every 6 hours as needed for  congestion  hollihesive skin barrier: apply to sacral wound every 3-5 days or as needed when soiled  insulin glargine 100 units/mL subcutaneous solution: 12 unit(s) subcutaneous once a day give @ 6PM  ipratropium-albuterol 0.5 mg-2.5 mg/3 mL inhalation solution: 3 milliliter(s) inhaled every 6 hours ICD 10 code  J68.3 dispense 30 day supply  lactobacillus acidophilus oral capsule: 1 cap(s) by gastrostomy tube once a day  levothyroxine 125 mcg (0.125 mg) oral tablet: 1 tab(s) by gastrostomy tube once a day Give at 5AM, 1 hour before starting PEG feeds  melatonin 0.25 mg/mL oral liquid: 12 milliliter(s) by gastrostomy tube once a day (at bedtime)  Multiple Vitamins with Minerals oral liquid: 15 milliliter(s) by gastrostomy tube once a day  naloxone 4 mg/0.1 mL nasal spray: 1 spray(s) intranasally once as needed for overdose 1 spray q 2-3 minutes alternating between nostrils  NovoLIN R FlexPen 100 units/mL injectable solution: 27 unit(s) injectable 3 times a day Give 11units @ 6AM, Give 9units @ 12PM, Give 7units @ 1800  nystatin 100,000 units/g topical powder: Apply topically to affected area 2 times a day  oxyCODONE 5 mg/5 mL oral solution: 5 milliliter(s) by gastrostomy tube every 6 hours as needed for Moderate Pain (4 - 6) MDD: 20mg  oxyCODONE 5 mg/5 mL oral solution: 10 milliliter(s) by gastrostomy tube every 6 hours as needed for Severe Pain (7 - 10) MDD: 40ml  pantoprazole 40 mg oral granule, delayed release: 40 milligram(s) by gastrostomy tube once a day  prednisoLONE acetate 1% ophthalmic suspension: 1 drop(s) to each affected eye 4 times a day apply to both eyes  predniSONE 5 mg/5 mL oral solution: 5 milliliter(s) by gastrostomy tube once a day  Seroquel 25 mg oral tablet: 0.5 tab(s) by gastrostomy tube once a day Given at 3PM while in hospital  simethicone 80 mg oral tablet, chewable: 1 tab(s) by gastrostomy tube every 6 hours  sputum culture collection bottles: for drainage from [place] on PEG-J MDD: 1  stomahesive paste: apply to wound bed daily  stomahesive powder: apply to sacral wound daily  Systane Complete Preservative Free ophthalmic solution: 1 drop(s) to each affected eye every 2 hours apply to both eyes      Social Hx: non-contributory    EOE: (-) swelling  TMJ (WNL)  Trismus (-)  LAD (-)    Dysphagia (-)    IOE: (+) swelling (+) palpation (+) mobility, lower labial mucosa with chronic ulcer and swelling adjacent to tooth #25 with severe occlusal wear and very sharp cusps, tooth #25 with grade 3 mobility posing an aspiration risk  Hard/Soft palate (WNL)  Tongue/Floor of Mouth (WNL)  Buccal Mucosa (WNL)  Percussion (-)    Radiographs: unable to obtain due to ventilator and bedridden    Assessment: Grade 3 mobility of tooth #25 posing an aspiration risk associated with labial swelling and ulceration    Treatment: Discussed clinical and radiographic findings. Written and verbal consent obtained. Protective stabalization. Applied 20% benzocaine. BB. Administered 1.7ccs 4% septocaine 1:100k epi via local infiltration. Throat drape placed. Extracted #25 with elevators and forceps atraumatically. Irrigated with sterile saline. Gauze hemostasis achieved. POIG including lip biting precautions. All questions answered.      Recommendations:   1. Soft diet. OTC pain meds as needed. 0.12% Chlorhexidine mouth rinse bid for 2 weeks as per ED.  2. Comprehensive dental care with [facility] [phone]  3. If any difficulty breathing/swallowing, excessive beeling or fever and swelling occur, return to ED.    Pedro Toure, DMD #[id number]

## 2024-06-03 NOTE — ED PROVIDER NOTE - CLINICAL SUMMARY MEDICAL DECISION MAKING FREE TEXT BOX
74Y F s/p trach/PEG after COVID infection, RA, HTN, hypothyroid, DM, presenting with broken tooth. There is an area of ulceration on the inner lower lip correlating with a broken tooth #24. No bleeding or drainage. Will call dental resident for eval as patient's daughter was told by outpatient dentist that they may be able to perform an extraction in ED.

## 2024-06-03 NOTE — ED PROVIDER NOTE - PATIENT PORTAL LINK FT
You can access the FollowMyHealth Patient Portal offered by [facility] by registering at the following website: [email or web address]. By joining [employer]’s FollowMyHealth portal, you will also be able to view your health information using other applications (apps) compatible with our system. You can access the FollowMyHealth Patient Portal offered by [facility] by registering at the following website: [email or web address]. By joining [facility]’s FollowMyHealth portal, you will also be able to view your health information using other applications (apps) compatible with our system.

## 2024-06-03 NOTE — ED ADULT NURSE NOTE - TEMPLATE LIST FOR HEAD TO TOE ASSESSMENT
Quality 110: Preventive Care And Screening: Influenza Immunization: Influenza immunization was not ordered or administered, reason not given
Quality 226: Preventive Care And Screening: Tobacco Use: Screening And Cessation Intervention: Patient screened for tobacco use and is an ex/non-smoker
Detail Level: Detailed
Quality 131: Pain Assessment And Follow-Up: Pain assessment using a standardized tool is documented as negative, no follow-up plan required
Quality 130: Documentation Of Current Medications In The Medical Record: Current Medications Documented
Dental

## 2024-06-03 NOTE — ED PROVIDER NOTE - ATTENDING CONTRIBUTION TO CARE
74F p/w loose tooth.  Chronically trach and pegged and vented s/p covid.  Pt had home care dentist saw pt who had loose tooth advised come to ED for tooth extraction.  No other symptoms.  Tooth is broken, front bottom, rubbing against lower lip, has laceration and tooth is abrading it.  Dental consult.  Dental will remove tooth given difficulty of getting pt to clinic as she is full care, trach/vent/PEG.  Later family concerned pt's BP is low, that pt may be volume depleted, gets PEG feed which pt has.  OK for PEG feeds, check labs, give fluids.    VS:  unremarkable except BP low    GEN - NAD;  chronically ill appearing;   A+O x0  Trach, vent  HEAD - NC/AT     ENT - PEERL, EOMI, mucous membranes    moist , no discharge    Multiple cavites/eroded teeth.  Lower central incisor angled outwards with erosion of inner lip, no bleeding or redness/swelling.  No laceration externally, no through and through lip lac.  Trach site c/d/i   NECK: Neck supple, non-tender without lymphadenopathy, no masses, no JVD  PULM - CTA b/l,  symmetric breath sounds  COR -  normal heart sounds    ABD - , ND, NT, soft, PEG site c/d/i  BACK - no CVA tenderness, nontender spine   (+)sacral DU about 2 cm, redness around it, butt paste there, occlusive dressing.  L perirectal area old fistula with some blood, no active drainage.  Hemorrhoids noted.  Blood likely from hemorrhoids, no active bleeding.    EXTREMS - (+)1 LE edema, no deformity, warm and well perfused    SKIN - no rash    or bruising      NEUROLOGIC - eyes open, minimally interactive.  withdraws all 4 to pain.

## 2024-06-03 NOTE — ED PROVIDER NOTE - NS_EDPROVIDERDISPOUSERTYPE_ED_A_ED
Attending Attestation (For Attendings USE Only)... O-L Flap Text: The defect edges were debeveled with a #15 scalpel blade.  Given the location of the defect, shape of the defect and the proximity to free margins an O-L flap was deemed most appropriate.  Using a sterile surgical marker, an appropriate advancement flap was drawn incorporating the defect and placing the expected incisions within the relaxed skin tension lines where possible.    The area thus outlined was incised deep to adipose tissue with a #15 scalpel blade.  The skin margins were undermined to an appropriate distance in all directions utilizing iris scissors.

## 2024-06-03 NOTE — ED PROVIDER NOTE - NSFOLLOWUPINSTRUCTIONS_ED_ALL_ED_FT
Pfizer dose 1, 2, and 3 Your evaluated in the emergency department for dental extraction.  Dental extraction was done by dentistry.    Please use the chlorhexidine rinses 2 times a day for 2 weeks.    Please follow-up with dentistry as per the phone number provided by that team.    Please return to the emergency department if you develop fever greater than 100.4, redness or pus drainage out of the extraction site, or any other concerning signs or symptoms. Your evaluated in the emergency department for dental extraction.  Dental extraction was done by dentistry.    Please call the dental clinic. Tell them: "[Chikis Woods] was in the ER, had an extraction, and was told I need to follow up in 1 week." That way they will be able to coordinate the appointment.    Please use the chlorhexidine rinses 2 times a day for 2 weeks.    Please return to the emergency department if you develop fever greater than 100.4, redness or pus drainage out of the extraction site, or any other concerning signs or symptoms.

## 2024-06-03 NOTE — ED PROVIDER NOTE - OBJECTIVE STATEMENT
74Y F s/p trach/PEG after COVID infection, RA, HTN, hypothyroid, DM, presenting with broken tooth. Daughter at bedside for assistance with history taking. Daughter states that she noticed a broken bottom tooth about 1.5 weeks ago, that has been rubbing up against bottom lip causing an open sore. Home call dentist saw her today but was unable to get patient into office for tooth removal; referred to [facility] to have dental team evaluate and potentially extract tooth. No bleeding or significant swelling noted to lower lip per daughter. No fevers.

## 2024-06-03 NOTE — ED CLERICAL - NS ED CLERK NOTE PRE-ARRIVAL INFORMATION; ADDITIONAL PRE-ARRIVAL INFORMATION

## 2024-06-03 NOTE — ED PROVIDER NOTE - PHYSICAL EXAMINATION
GENERAL: Awake, alert, NAD  HEAD: NC/AT, neck supple, moist mucous membranes ***  EYES: PERRL, EOM grossly intact, sclera anicteric  LUNGS: normal WOB on RA, CTAB, no wheezes or crackles   CARDIAC: RRR, no m/r/g  ABDOMEN: Soft, non tender, non distended, no rebound, no guarding  BACK: No midline spinal tenderness, no CVA tenderness  EXT: No edema, no calf tenderness, no deformities.  NEURO: A&Ox3. Moving all extremities.  SKIN: Warm and dry. No rash.  PSYCH: Normal affect. GENERAL: Awake, alert, NAD  HEAD: NC/AT, neck supple, moist mucous membranes; broken tooth #24 noted, rubbing against lower lip causing ulceration of the mid lower lip.  No bleeding or discharge.  Swelling is minimal.  EYES: PERRL, EOM grossly intact, sclera anicteric  LUNGS: trach/vented, lungs clear to auscultation   CARDIAC: RRR, no m/r/g  ABDOMEN: Soft, non tender, non distended, no rebound, no guarding  BACK: No midline spinal tenderness, no CVA tenderness  EXT: No edema, no calf tenderness, no deformities.  NEURO: A&Ox2. Moving all extremities.  SKIN: Warm and dry. No rash.  PSYCH: Normal affect.

## 2024-06-03 NOTE — ED ADULT NURSE NOTE - NSFALLRISKINTERV_ED_ALL_ED

## 2024-06-04 ENCOUNTER — TRANSCRIPTION ENCOUNTER (OUTPATIENT)
Age: 74
End: 2024-06-04

## 2024-06-04 ENCOUNTER — NON-APPOINTMENT (OUTPATIENT)
Age: 74
End: 2024-06-04

## 2024-06-04 ENCOUNTER — LABORATORY RESULT (OUTPATIENT)
Age: 74
End: 2024-06-04

## 2024-06-04 VITALS
SYSTOLIC BLOOD PRESSURE: 135 MMHG | RESPIRATION RATE: 16 BRPM | DIASTOLIC BLOOD PRESSURE: 75 MMHG | TEMPERATURE: 98 F | HEART RATE: 78 BPM | OXYGEN SATURATION: 100 %

## 2024-06-04 LAB
BLD GP AB SCN SERPL QL: POSITIVE — SIGNIFICANT CHANGE UP
RH IG SCN BLD-IMP: POSITIVE — SIGNIFICANT CHANGE UP

## 2024-06-04 PROCEDURE — 86077 PHYS BLOOD BANK SERV XMATCH: CPT

## 2024-06-04 RX ORDER — IPRATROPIUM/ALBUTEROL SULFATE 18-103MCG
3 AEROSOL WITH ADAPTER (GRAM) INHALATION ONCE
Refills: 0 | Status: COMPLETED | OUTPATIENT
Start: 2024-06-04 | End: 2024-06-04

## 2024-06-04 RX ORDER — ACETYLCYSTEINE 200 MG/ML
4 VIAL (ML) MISCELLANEOUS ONCE
Refills: 0 | Status: DISCONTINUED | OUTPATIENT
Start: 2024-06-04 | End: 2024-06-04

## 2024-06-04 RX ORDER — SIMETHICONE 80 MG/1
250 TABLET, CHEWABLE ORAL ONCE
Refills: 0 | Status: DISCONTINUED | OUTPATIENT
Start: 2024-06-04 | End: 2024-06-04

## 2024-06-04 RX ORDER — PANTOPRAZOLE SODIUM 20 MG/1
40 TABLET, DELAYED RELEASE ORAL ONCE
Refills: 0 | Status: COMPLETED | OUTPATIENT
Start: 2024-06-04 | End: 2024-06-04

## 2024-06-04 RX ORDER — LEVOTHYROXINE SODIUM 125 MCG
125 TABLET ORAL ONCE
Refills: 0 | Status: COMPLETED | OUTPATIENT
Start: 2024-06-04 | End: 2024-06-04

## 2024-06-04 RX ORDER — KETOROLAC TROMETHAMINE 30 MG/ML
30 SYRINGE (ML) INJECTION ONCE
Refills: 0 | Status: DISCONTINUED | OUTPATIENT
Start: 2024-06-04 | End: 2024-06-04

## 2024-06-04 RX ORDER — ACETYLCYSTEINE 200 MG/ML
2 VIAL (ML) MISCELLANEOUS ONCE
Refills: 0 | Status: COMPLETED | OUTPATIENT
Start: 2024-06-04 | End: 2024-06-04

## 2024-06-04 RX ORDER — ACETAMINOPHEN 500 MG
650 TABLET ORAL ONCE
Refills: 0 | Status: COMPLETED | OUTPATIENT
Start: 2024-06-04 | End: 2024-06-04

## 2024-06-04 RX ORDER — SIMETHICONE 80 MG/1
80 TABLET, CHEWABLE ORAL ONCE
Refills: 0 | Status: COMPLETED | OUTPATIENT
Start: 2024-06-04 | End: 2024-06-04

## 2024-06-04 RX ORDER — CHLORHEXIDINE GLUCONATE 213 G/1000ML
15 SOLUTION TOPICAL
Qty: 1 | Refills: 0
Start: 2024-06-04 | End: 2024-06-17

## 2024-06-04 RX ADMIN — Medication 650 MILLIGRAM(S): at 07:05

## 2024-06-04 RX ADMIN — Medication 30 MILLIGRAM(S): at 11:44

## 2024-06-04 RX ADMIN — PANTOPRAZOLE SODIUM 40 MILLIGRAM(S): 20 TABLET, DELAYED RELEASE ORAL at 07:12

## 2024-06-04 RX ADMIN — Medication 3 MILLILITER(S): at 07:07

## 2024-06-04 RX ADMIN — Medication 125 MICROGRAM(S): at 07:05

## 2024-06-04 RX ADMIN — Medication 2 MILLILITER(S): at 07:21

## 2024-06-04 RX ADMIN — SIMETHICONE 80 MILLIGRAM(S): 80 TABLET, CHEWABLE ORAL at 07:26

## 2024-06-04 NOTE — ED ADULT NURSE REASSESSMENT NOTE - NS ED NURSE REASSESS COMMENT FT1
Patient resting comfortably in stretcher, breathing even and unlabored. NAD noted. Stretcher in lowest position, wheels locked, appropriate side rails in place, call bell in reach. Home health aid at bedside. Pending transport home.

## 2024-06-04 NOTE — CHART NOTE - NSCHARTNOTEFT_GEN_A_CORE
SW alerted of case by ANM for SW intervention. Overnight SW arranged ALS with [facility] which they later transferred to [facility]. SW followed up with [facility] who advised that the trip was set for 12pm pickup. SW requested escalation as the pt has been waiting since 1am for pickup. SW was then alerted by family that private LPN is present at bedside with portable vent and daughter is comfortable discharging pt home in BLS ambulance under the medical care of private LPN at bedside. SW escalated to SW manager who escalated to [facility] management who gave approval for S trip.     Later in the morning, SW was alerted that S ambulance came to  pt, however, the pt needed to be changed and due to timing/delay they were no longer able to accommodate the trip. Trip rescheduled with [facility] although they now will only transport pt with ALS ambulance despite having necessary equipment/staff to accommodate safe transfer in S.     Pt's daughter expressed frustrations surrounding care and delays with [facility]. SW linked pt's daughter to [facility] for concerns. SW validated her feelings and frustrations, discussed with multidisciplinary team and pt's daughter to alleviate concerns and practiced strengths focused support. SW and ED management continues to be involved with case    No other SW needs identified at this time. SW alerted of case by ANM for SW intervention. Overnight SW arranged ALS with [facility] which they later transferred to [facility]. SW followed up with [facility] who advised that the trip was set for 12pm pickup. SW requested escalation as the pt has been waiting since 1am for pickup. Pt's daughter inquired if we were able to use another company such as [email or web address] due to delay, which due to contractual purposes is not an option at this time. SW was then alerted by family that private LPN is present at bedside with portable vent and daughter is comfortable discharging pt home in BLS ambulance under the medical care of private LPN at bedside. SW escalated to SW manager who escalated to [facility] management who gave approval for BLS trip.     Later in the morning, SW was alerted that S ambulance came to  pt, however, the pt needed to be changed and due to timing/delay they were no longer able to accommodate the trip. Trip rescheduled with [facility] although they now will only transport pt with ALS ambulance despite having necessary equipment/staff to accommodate safe transfer in BLS.     Pt's daughter expressed frustrations surrounding care and delays with [facility]. SW linked pt's daughter to [facility] for concerns. SW validated her feelings and frustrations, discussed with multidisciplinary team and pt's daughter to alleviate concerns and practiced strengths focused support. SW and ED management continues to be involved with case    No other SW needs identified at this time.

## 2024-06-04 NOTE — PROVIDER CONTACT NOTE (OTHER) - ASSESSMENT
Notified by provider that pt is ready for d/c; needs ambulance transport.  Pt with a trach; needs ALS ambulance due to need for suctioning.  Pt is returning home.

## 2024-06-04 NOTE — PROVIDER CONTACT NOTE (OTHER) - ACTION/TREATMENT ORDERED:
Called [facility]; arranged transport-will be assigned to [facility] for transport home.

## 2024-06-04 NOTE — ED ADULT NURSE REASSESSMENT NOTE - NS ED NURSE REASSESS COMMENT FT1
Pt has chronic trach attached to ventilator, breathing even and unlabored at present, spo2 100%, no signs of pain noted, abdomen soft, non-tender, non-distended, safety maintained with home health aide at bedside, SW arranging transportation for pt, will continue to monitor.

## 2024-06-05 ENCOUNTER — NON-APPOINTMENT (OUTPATIENT)
Age: 74
End: 2024-06-05

## 2024-06-05 ENCOUNTER — APPOINTMENT (OUTPATIENT)
Dept: HOME HEALTH SERVICES | Facility: HOME HEALTH | Age: 74
End: 2024-06-05

## 2024-06-05 ENCOUNTER — LABORATORY RESULT (OUTPATIENT)
Age: 74
End: 2024-06-05

## 2024-06-05 LAB — BASIC METABOLIC PANEL: NORMAL

## 2024-06-06 ENCOUNTER — NON-APPOINTMENT (OUTPATIENT)
Age: 74
End: 2024-06-06

## 2024-06-06 ENCOUNTER — APPOINTMENT (OUTPATIENT)
Dept: ENDOCRINOLOGY | Facility: CLINIC | Age: 74
End: 2024-06-06

## 2024-06-06 DIAGNOSIS — E03.9 HYPOTHYROIDISM, UNSPECIFIED: ICD-10-CM

## 2024-06-06 DIAGNOSIS — Z79.899 OTHER LONG TERM (CURRENT) DRUG THERAPY: ICD-10-CM

## 2024-06-06 DIAGNOSIS — E27.9 DISORDER OF ADRENAL GLAND, UNSPECIFIED: ICD-10-CM

## 2024-06-06 DIAGNOSIS — E11.9 TYPE 2 DIABETES MELLITUS W/OUT COMPLICATIONS: ICD-10-CM

## 2024-06-06 LAB — CBC W/ AUTO DIFF: NORMAL

## 2024-06-06 PROCEDURE — 99215 OFFICE O/P EST HI 40 MIN: CPT

## 2024-06-06 PROCEDURE — G2211 COMPLEX E/M VISIT ADD ON: CPT

## 2024-06-07 ENCOUNTER — NON-APPOINTMENT (OUTPATIENT)
Age: 74
End: 2024-06-07

## 2024-06-07 DIAGNOSIS — R52 PAIN, UNSPECIFIED: ICD-10-CM

## 2024-06-10 ENCOUNTER — NON-APPOINTMENT (OUTPATIENT)
Age: 74
End: 2024-06-10

## 2024-06-10 DIAGNOSIS — A49.8 OTHER BACTERIAL INFECTIONS OF UNSPECIFIED SITE: ICD-10-CM

## 2024-06-10 RX ORDER — HUMAN INSULIN 100 [IU]/ML
100 INJECTION, SOLUTION SUBCUTANEOUS
Qty: 3 | Refills: 0 | Status: ACTIVE | COMMUNITY
Start: 2024-02-12

## 2024-06-10 RX ORDER — PANTOPRAZOLE 40 MG/1
40 TABLET, DELAYED RELEASE ORAL
Refills: 0 | Status: ACTIVE | COMMUNITY

## 2024-06-10 RX ORDER — POLYETHYLENE GLYCOL 400 AND PROPYLENE GLYCOL 4; 3 MG/ML; MG/ML
0.4-0.3 SOLUTION/ DROPS OPHTHALMIC
Refills: 0 | Status: ACTIVE | COMMUNITY
Start: 2024-05-06

## 2024-06-10 RX ORDER — SENNOSIDES 8.6 MG TABLETS 8.6 MG/1
8.6 TABLET ORAL
Refills: 0 | Status: ACTIVE | COMMUNITY

## 2024-06-10 RX ORDER — SOLIFENACIN SUCCINATE 5 MG/1
5 TABLET ORAL
Refills: 0 | Status: ACTIVE | COMMUNITY

## 2024-06-10 RX ORDER — ASCORBIC ACID 500 MG
TABLET ORAL
Refills: 0 | Status: ACTIVE | COMMUNITY

## 2024-06-10 RX ORDER — MAGNESIUM AMINO ACID CHELATE 100 MG
TABLET ORAL
Refills: 0 | Status: ACTIVE | COMMUNITY

## 2024-06-10 RX ORDER — LORATADINE 10 MG
17 TABLET,DISINTEGRATING ORAL
Refills: 0 | Status: ACTIVE | COMMUNITY

## 2024-06-10 RX ORDER — QUETIAPINE FUMARATE 25 MG/1
25 TABLET ORAL AT BEDTIME
Refills: 0 | Status: ACTIVE | COMMUNITY
Start: 2024-02-12

## 2024-06-10 RX ORDER — PREDNISONE INTENSOL 5 MG/ML
5 SOLUTION, CONCENTRATE ORAL
Refills: 0 | Status: ACTIVE | COMMUNITY
Start: 2024-05-06

## 2024-06-10 RX ORDER — LEVOTHYROXINE SODIUM 50 UG/1
50 TABLET ORAL DAILY
Refills: 0 | Status: ACTIVE | COMMUNITY

## 2024-06-10 RX ORDER — SIMETHICONE 125 MG
125 CAPSULE ORAL 4 TIMES DAILY
Refills: 0 | Status: ACTIVE | COMMUNITY
Start: 2024-05-06

## 2024-06-10 RX ORDER — BLOOD-GLUCOSE,RECEIVER,CONT
EACH MISCELLANEOUS
Qty: 1 | Refills: 0 | Status: ACTIVE | COMMUNITY
Start: 2024-02-12

## 2024-06-10 RX ORDER — DENOSUMAB 60 MG/ML
60 INJECTION SUBCUTANEOUS
Refills: 0 | Status: ACTIVE | COMMUNITY

## 2024-06-10 RX ORDER — BLOOD-GLUCOSE SENSOR
EACH MISCELLANEOUS
Qty: 3 | Refills: 3 | Status: ACTIVE | COMMUNITY
Start: 2024-02-12

## 2024-06-10 RX ORDER — LIDOCAINE 5% 700 MG/1
5 PATCH TOPICAL
Qty: 1 | Refills: 0 | Status: ACTIVE | COMMUNITY
Start: 2024-05-06

## 2024-06-10 RX ORDER — OXYCODONE HYDROCHLORIDE 5 MG/5ML
5 SOLUTION ORAL
Qty: 1 | Refills: 0 | Status: ACTIVE | COMMUNITY
Start: 2024-06-07

## 2024-06-10 NOTE — HISTORY OF PRESENT ILLNESS
[FreeTextEntry1] :  This visit was provided via telehealth using real-time 2-way audio visual technology. The patient, MAXX WOODS was located at home, [address] [place] [address]. [place] [address] at the time of the visit.  The provider, NANCY ALONSO DO, was located at the medical office located in [address] [place] [address] [address] [facility], [place] [address] at the time of the visit. The patient, MAXX WOODS and Provider participated in the telehealth encounter along with daughter, Marysol Woods.  Verbal consent given on MARCH 18, 2024 by Patient's daughter, Marysol.   75 y/o F with complex medical history with a PMHx of T2DM, HTN, HLD, anemia, hypothyroidism, RA, fibromyalgia, remote cerebral aneurysm repair, acute hypercapnic respiratory failure now with tracheostomy, PEG tube, and bedbound (trach change 8/18, PEG change 8/14), sacral pressure ulcer. Patient was recently in the hospital for 6 day hx of bleeding from the tracheostomy and PEG tube with associated abdominal pain. Endocrinology was consulted for hyperglycemia   Prior history: Spoke with patient's daughter Marysol who reports DM2 for 20 years, thought to be due to longterm high doses of prednisone for treatment of rheumatoid arthritis Patient's tube feeds are now at 80ml/hr - running for about 12-13 hours   Daughter reports no known microvascular complications Patients home insulin regimen prior to coming to this hospital was levemir 14 units daily and metformin 500mg BID PAtient has been mostly hospitalized for the last 1 year and daughter reports varying insulin regimens and glucose control.  fasting- 120s midday- 100s evening- mid-high 200s   lows occasionally if feeds are held    Of note, patient is on Lt4 125mcg daily

## 2024-06-11 ENCOUNTER — NON-APPOINTMENT (OUTPATIENT)
Age: 74
End: 2024-06-11

## 2024-06-11 DIAGNOSIS — R19.7 DIARRHEA, UNSPECIFIED: ICD-10-CM

## 2024-06-11 DIAGNOSIS — Z79.899 OTHER LONG TERM (CURRENT) DRUG THERAPY: ICD-10-CM

## 2024-06-12 RX ORDER — ACETYLCYSTEINE 200 MG/ML
20 SOLUTION ORAL; RESPIRATORY (INHALATION) 4 TIMES DAILY
Qty: 1 | Refills: 0 | Status: ACTIVE | COMMUNITY
Start: 2024-06-12

## 2024-06-13 ENCOUNTER — NON-APPOINTMENT (OUTPATIENT)
Age: 74
End: 2024-06-13

## 2024-06-13 RX ORDER — BEZLOTOXUMAB 25 MG/ML
1000 INJECTION, SOLUTION INTRAVENOUS
Qty: 20 | Refills: 0 | Status: ACTIVE | COMMUNITY
Start: 2024-05-02 | End: 1900-01-01

## 2024-06-14 ENCOUNTER — NON-APPOINTMENT (OUTPATIENT)
Age: 74
End: 2024-06-14

## 2024-06-14 ENCOUNTER — APPOINTMENT (OUTPATIENT)
Dept: WOUND CARE | Facility: HOSPITAL | Age: 74
End: 2024-06-14
Payer: MEDICARE

## 2024-06-14 ENCOUNTER — TRANSCRIPTION ENCOUNTER (OUTPATIENT)
Age: 74
End: 2024-06-14

## 2024-06-14 ENCOUNTER — OUTPATIENT (OUTPATIENT)
Dept: OUTPATIENT SERVICES | Facility: HOSPITAL | Age: 74
LOS: 1 days | End: 2024-06-14
Payer: MEDICARE

## 2024-06-14 DIAGNOSIS — L29.0 PRURITUS ANI: ICD-10-CM

## 2024-06-14 DIAGNOSIS — J18.9 PNEUMONIA, UNSPECIFIED ORGANISM: ICD-10-CM

## 2024-06-14 DIAGNOSIS — L30.8 OTHER SPECIFIED DERMATITIS: ICD-10-CM

## 2024-06-14 LAB
ALBUMIN SERPL ELPH-MCNC: 2.7 G/DL
ALP BLD-CCNC: 56 U/L
ALT SERPL-CCNC: 25 U/L
ANION GAP SERPL CALC-SCNC: 11 MMOL/L
AST SERPL-CCNC: 35 U/L
BILIRUB SERPL-MCNC: 0.4 MG/DL
BUN SERPL-MCNC: 24 MG/DL
CALCIUM SERPL-MCNC: 8 MG/DL
CHLORIDE SERPL-SCNC: 94 MMOL/L
CO2 SERPL-SCNC: 26 MMOL/L
CREAT SERPL-MCNC: 0.15 MG/DL
EGFR: 133 ML/MIN/1.73M2
GLUCOSE SERPL-MCNC: 130 MG/DL
HCT VFR BLD CALC: 27.1 %
HGB BLD-MCNC: 8.1 G/DL
MCHC RBC-ENTMCNC: 28.5 PG
MCHC RBC-ENTMCNC: 29.9 GM/DL
MCV RBC AUTO: 95.4 FL
PLATELET # BLD AUTO: 144 K/UL
POTASSIUM SERPL-SCNC: 3.8 MMOL/L
PROT SERPL-MCNC: 6.1 G/DL
RBC # BLD: 2.84 M/UL
RBC # FLD: 20 %
SODIUM SERPL-SCNC: 132 MMOL/L
WBC # FLD AUTO: 8.9 K/UL

## 2024-06-14 PROCEDURE — 99205 OFFICE O/P NEW HI 60 MIN: CPT | Mod: 95

## 2024-06-14 PROCEDURE — G0463: CPT

## 2024-06-14 RX ORDER — DIPHENHYDRAMINE HCL ORAL 25 MG/10ML
25 SOLUTION ORAL
Qty: 200 | Refills: 0 | Status: ACTIVE | COMMUNITY
Start: 2024-06-14 | End: 1900-01-01

## 2024-06-14 RX ORDER — CEFPODOXIME PROXETIL 200 MG/1
200 TABLET, FILM COATED ORAL
Qty: 10 | Refills: 0 | Status: ACTIVE | COMMUNITY
Start: 2024-06-14 | End: 1900-01-01

## 2024-06-15 ENCOUNTER — TRANSCRIPTION ENCOUNTER (OUTPATIENT)
Age: 74
End: 2024-06-15

## 2024-06-17 ENCOUNTER — TRANSCRIPTION ENCOUNTER (OUTPATIENT)
Age: 74
End: 2024-06-17

## 2024-06-17 DIAGNOSIS — R21 RASH AND OTHER NONSPECIFIC SKIN ERUPTION: ICD-10-CM

## 2024-06-17 DIAGNOSIS — G89.4 CHRONIC PAIN SYNDROME: ICD-10-CM

## 2024-06-17 RX ORDER — CLOTRIMAZOLE AND BETAMETHASONE DIPROPIONATE 10; .5 MG/G; MG/G
1-0.05 CREAM TOPICAL TWICE DAILY
Qty: 1 | Refills: 3 | Status: ACTIVE | COMMUNITY
Start: 2024-06-17 | End: 1900-01-01

## 2024-06-17 RX ORDER — FENTANYL 25 UG/H
25 PATCH, EXTENDED RELEASE TRANSDERMAL
Qty: 5 | Refills: 0 | Status: COMPLETED | COMMUNITY
Start: 2024-04-26 | End: 2024-06-17

## 2024-06-18 ENCOUNTER — NON-APPOINTMENT (OUTPATIENT)
Age: 74
End: 2024-06-18

## 2024-06-19 ENCOUNTER — NON-APPOINTMENT (OUTPATIENT)
Age: 74
End: 2024-06-19

## 2024-06-20 ENCOUNTER — NON-APPOINTMENT (OUTPATIENT)
Age: 74
End: 2024-06-20

## 2024-06-20 ENCOUNTER — EMERGENCY (EMERGENCY)
Facility: HOSPITAL | Age: 74
LOS: 1 days | Discharge: HOME | End: 2024-06-20
Attending: EMERGENCY MEDICINE
Payer: MEDICARE

## 2024-06-20 VITALS
RESPIRATION RATE: 12 BRPM | DIASTOLIC BLOOD PRESSURE: 47 MMHG | SYSTOLIC BLOOD PRESSURE: 102 MMHG | TEMPERATURE: 99 F | HEART RATE: 90 BPM | WEIGHT: 100.09 LBS | HEIGHT: 60 IN | OXYGEN SATURATION: 98 %

## 2024-06-20 DIAGNOSIS — Z93.1 GASTROSTOMY STATUS: Chronic | ICD-10-CM

## 2024-06-20 DIAGNOSIS — Z98.890 OTHER SPECIFIED POSTPROCEDURAL STATES: Chronic | ICD-10-CM

## 2024-06-20 LAB
ALBUMIN SERPL ELPH-MCNC: 3 G/DL — LOW (ref 3.3–5)
ALP SERPL-CCNC: 82 U/L — SIGNIFICANT CHANGE UP (ref 40–120)
ALT FLD-CCNC: 68 U/L — HIGH (ref 10–45)
ANION GAP SERPL CALC-SCNC: 10 MMOL/L — SIGNIFICANT CHANGE UP (ref 5–17)
APPEARANCE UR: CLEAR — SIGNIFICANT CHANGE UP
AST SERPL-CCNC: 76 U/L — HIGH (ref 10–40)
BACTERIA # UR AUTO: NEGATIVE /HPF — SIGNIFICANT CHANGE UP
BASE EXCESS BLDV CALC-SCNC: 6.7 MMOL/L — HIGH (ref -2–3)
BASOPHILS # BLD AUTO: 0 K/UL — SIGNIFICANT CHANGE UP (ref 0–0.2)
BASOPHILS NFR BLD AUTO: 0 % — SIGNIFICANT CHANGE UP (ref 0–2)
BILIRUB SERPL-MCNC: 0.3 MG/DL — SIGNIFICANT CHANGE UP (ref 0.2–1.2)
BILIRUB UR-MCNC: NEGATIVE — SIGNIFICANT CHANGE UP
BUN SERPL-MCNC: 35 MG/DL — HIGH (ref 7–23)
CA-I SERPL-SCNC: 1.28 MMOL/L — SIGNIFICANT CHANGE UP (ref 1.15–1.33)
CALCIUM SERPL-MCNC: 9.2 MG/DL — SIGNIFICANT CHANGE UP (ref 8.4–10.5)
CAST: 0 /LPF — SIGNIFICANT CHANGE UP (ref 0–4)
CHLORIDE BLDV-SCNC: 99 MMOL/L — SIGNIFICANT CHANGE UP (ref 96–108)
CHLORIDE SERPL-SCNC: 98 MMOL/L — SIGNIFICANT CHANGE UP (ref 96–108)
CO2 BLDV-SCNC: 35 MMOL/L — HIGH (ref 22–26)
CO2 SERPL-SCNC: 29 MMOL/L — SIGNIFICANT CHANGE UP (ref 22–31)
COLOR SPEC: YELLOW — SIGNIFICANT CHANGE UP
CREAT SERPL-MCNC: <0.3 MG/DL — LOW (ref 0.5–1.3)
DIFF PNL FLD: ABNORMAL
EGFR: 113 ML/MIN/1.73M2 — SIGNIFICANT CHANGE UP
EOSINOPHIL # BLD AUTO: 0 K/UL — SIGNIFICANT CHANGE UP (ref 0–0.5)
EOSINOPHIL NFR BLD AUTO: 0 % — SIGNIFICANT CHANGE UP (ref 0–6)
GAS PNL BLDV: 134 MMOL/L — LOW (ref 136–145)
GAS PNL BLDV: SIGNIFICANT CHANGE UP
GAS PNL BLDV: SIGNIFICANT CHANGE UP
GLUCOSE BLDV-MCNC: 156 MG/DL — HIGH (ref 70–99)
GLUCOSE SERPL-MCNC: 170 MG/DL — HIGH (ref 70–99)
GLUCOSE UR QL: NEGATIVE MG/DL — SIGNIFICANT CHANGE UP
HCO3 BLDV-SCNC: 33 MMOL/L — HIGH (ref 22–29)
HCT VFR BLD CALC: 31.1 % — LOW (ref 34.5–45)
HCT VFR BLDA CALC: 28 % — LOW (ref 34.5–46.5)
HGB BLD CALC-MCNC: 9.3 G/DL — LOW (ref 11.7–16.1)
HGB BLD-MCNC: 8.8 G/DL — LOW (ref 11.5–15.5)
KETONES UR-MCNC: ABNORMAL MG/DL
LACTATE BLDV-MCNC: 2 MMOL/L — SIGNIFICANT CHANGE UP (ref 0.5–2)
LEUKOCYTE ESTERASE UR-ACNC: ABNORMAL
LYMPHOCYTES # BLD AUTO: 1.35 K/UL — SIGNIFICANT CHANGE UP (ref 1–3.3)
LYMPHOCYTES # BLD AUTO: 13 % — SIGNIFICANT CHANGE UP (ref 13–44)
MAGNESIUM SERPL-MCNC: 2 MG/DL — SIGNIFICANT CHANGE UP (ref 1.6–2.6)
MANUAL SMEAR VERIFICATION: SIGNIFICANT CHANGE UP
MCHC RBC-ENTMCNC: 28.2 PG — SIGNIFICANT CHANGE UP (ref 27–34)
MCHC RBC-ENTMCNC: 28.3 GM/DL — LOW (ref 32–36)
MCV RBC AUTO: 99.7 FL — SIGNIFICANT CHANGE UP (ref 80–100)
MONOCYTES # BLD AUTO: 0.31 K/UL — SIGNIFICANT CHANGE UP (ref 0–0.9)
MONOCYTES NFR BLD AUTO: 3 % — SIGNIFICANT CHANGE UP (ref 2–14)
MYELOCYTES NFR BLD: 5 % — HIGH (ref 0–0)
NEUTROPHILS # BLD AUTO: 8.09 K/UL — HIGH (ref 1.8–7.4)
NEUTROPHILS NFR BLD AUTO: 75 % — SIGNIFICANT CHANGE UP (ref 43–77)
NEUTS BAND # BLD: 3 % — SIGNIFICANT CHANGE UP (ref 0–8)
NITRITE UR-MCNC: NEGATIVE — SIGNIFICANT CHANGE UP
NRBC # BLD: 1 /100 WBCS — HIGH (ref 0–0)
PCO2 BLDV: 59 MMHG — HIGH (ref 39–42)
PH BLDV: 7.36 — SIGNIFICANT CHANGE UP (ref 7.32–7.43)
PH UR: 6.5 — SIGNIFICANT CHANGE UP (ref 5–8)
PHOSPHATE SERPL-MCNC: 4.1 MG/DL — SIGNIFICANT CHANGE UP (ref 2.5–4.5)
PLAT MORPH BLD: NORMAL — SIGNIFICANT CHANGE UP
PLATELET # BLD AUTO: 171 K/UL — SIGNIFICANT CHANGE UP (ref 150–400)
PO2 BLDV: 49 MMHG — HIGH (ref 25–45)
POTASSIUM BLDV-SCNC: 4.5 MMOL/L — SIGNIFICANT CHANGE UP (ref 3.5–5.1)
POTASSIUM SERPL-MCNC: 4.9 MMOL/L — SIGNIFICANT CHANGE UP (ref 3.5–5.3)
POTASSIUM SERPL-SCNC: 4.9 MMOL/L — SIGNIFICANT CHANGE UP (ref 3.5–5.3)
PROMYELOCYTES # FLD: 1 % — HIGH (ref 0–0)
PROT SERPL-MCNC: 7.6 G/DL — SIGNIFICANT CHANGE UP (ref 6–8.3)
PROT UR-MCNC: NEGATIVE MG/DL — SIGNIFICANT CHANGE UP
RBC # BLD: 3.12 M/UL — LOW (ref 3.8–5.2)
RBC # FLD: 19.5 % — HIGH (ref 10.3–14.5)
RBC BLD AUTO: SIGNIFICANT CHANGE UP
RBC CASTS # UR COMP ASSIST: 2 /HPF — SIGNIFICANT CHANGE UP (ref 0–4)
SAO2 % BLDV: 78.3 % — SIGNIFICANT CHANGE UP (ref 67–88)
SODIUM SERPL-SCNC: 137 MMOL/L — SIGNIFICANT CHANGE UP (ref 135–145)
SP GR SPEC: 1.02 — SIGNIFICANT CHANGE UP (ref 1–1.03)
SQUAMOUS # UR AUTO: 0 /HPF — SIGNIFICANT CHANGE UP (ref 0–5)
UROBILINOGEN FLD QL: 0.2 MG/DL — SIGNIFICANT CHANGE UP (ref 0.2–1)
WBC # BLD: 10.37 K/UL — SIGNIFICANT CHANGE UP (ref 3.8–10.5)
WBC # FLD AUTO: 10.37 K/UL — SIGNIFICANT CHANGE UP (ref 3.8–10.5)
WBC UR QL: 4 /HPF — SIGNIFICANT CHANGE UP (ref 0–5)

## 2024-06-20 PROCEDURE — 74177 CT ABD & PELVIS W/CONTRAST: CPT | Mod: 26,MC

## 2024-06-20 PROCEDURE — 71045 X-RAY EXAM CHEST 1 VIEW: CPT | Mod: 26

## 2024-06-20 PROCEDURE — 99285 EMERGENCY DEPT VISIT HI MDM: CPT | Mod: GC

## 2024-06-20 PROCEDURE — 99283 EMERGENCY DEPT VISIT LOW MDM: CPT

## 2024-06-20 RX ORDER — OXYCODONE HYDROCHLORIDE 5 MG/1
5 TABLET ORAL ONCE
Refills: 0 | Status: DISCONTINUED | OUTPATIENT
Start: 2024-06-20 | End: 2024-06-20

## 2024-06-20 RX ORDER — IPRATROPIUM/ALBUTEROL SULFATE 18-103MCG
3 AEROSOL WITH ADAPTER (GRAM) INHALATION ONCE
Refills: 0 | Status: COMPLETED | OUTPATIENT
Start: 2024-06-20 | End: 2024-06-20

## 2024-06-20 RX ORDER — ACETAMINOPHEN 500 MG
675 TABLET ORAL ONCE
Refills: 0 | Status: COMPLETED | OUTPATIENT
Start: 2024-06-20 | End: 2024-06-20

## 2024-06-20 RX ORDER — LIDOCAINE 4 G/100G
1 CREAM TOPICAL ONCE
Refills: 0 | Status: COMPLETED | OUTPATIENT
Start: 2024-06-20 | End: 2024-06-20

## 2024-06-20 RX ORDER — QUETIAPINE FUMARATE 200 MG/1
12.5 TABLET, FILM COATED ORAL ONCE
Refills: 0 | Status: DISCONTINUED | OUTPATIENT
Start: 2024-06-20 | End: 2024-06-20

## 2024-06-20 RX ORDER — SODIUM CHLORIDE 9 MG/ML
1000 INJECTION INTRAMUSCULAR; INTRAVENOUS; SUBCUTANEOUS
Refills: 0 | Status: ACTIVE | OUTPATIENT
Start: 2024-06-20 | End: 2025-05-19

## 2024-06-20 RX ORDER — ESCITALOPRAM OXALATE 10 MG/1
10 TABLET, FILM COATED ORAL DAILY
Refills: 0 | Status: ACTIVE | OUTPATIENT
Start: 2024-06-20 | End: 2025-05-19

## 2024-06-20 RX ORDER — SIMETHICONE 80 MG/1
80 TABLET, CHEWABLE ORAL ONCE
Refills: 0 | Status: COMPLETED | OUTPATIENT
Start: 2024-06-20 | End: 2024-06-20

## 2024-06-20 RX ORDER — ZINC OXIDE 200 MG/G
1 OINTMENT TOPICAL ONCE
Refills: 0 | Status: COMPLETED | OUTPATIENT
Start: 2024-06-20 | End: 2024-06-20

## 2024-06-20 RX ORDER — QUETIAPINE FUMARATE 200 MG/1
12.5 TABLET, FILM COATED ORAL ONCE
Refills: 0 | Status: COMPLETED | OUTPATIENT
Start: 2024-06-20 | End: 2024-06-20

## 2024-06-20 RX ORDER — CHLORHEXIDINE GLUCONATE 213 G/1000ML
15 SOLUTION TOPICAL EVERY 12 HOURS
Refills: 0 | Status: ACTIVE | OUTPATIENT
Start: 2024-06-20 | End: 2025-05-19

## 2024-06-20 RX ORDER — SIMETHICONE 80 MG/1
80 TABLET, CHEWABLE ORAL ONCE
Refills: 0 | Status: DISCONTINUED | OUTPATIENT
Start: 2024-06-20 | End: 2024-06-20

## 2024-06-20 RX ORDER — ESCITALOPRAM OXALATE 10 MG/1
10 TABLET, FILM COATED ORAL DAILY
Refills: 0 | Status: DISCONTINUED | OUTPATIENT
Start: 2024-06-20 | End: 2024-06-20

## 2024-06-20 RX ORDER — ACETYLCYSTEINE 200 MG/ML
4 VIAL (ML) MISCELLANEOUS ONCE
Refills: 0 | Status: COMPLETED | OUTPATIENT
Start: 2024-06-20 | End: 2024-06-20

## 2024-06-20 RX ORDER — DIPHENHYDRAMINE HCL 50 MG
25 CAPSULE ORAL ONCE
Refills: 0 | Status: COMPLETED | OUTPATIENT
Start: 2024-06-20 | End: 2024-06-20

## 2024-06-20 RX ADMIN — SODIUM CHLORIDE 100 MILLILITER(S): 9 INJECTION INTRAMUSCULAR; INTRAVENOUS; SUBCUTANEOUS at 14:41

## 2024-06-20 RX ADMIN — LIDOCAINE 1 APPLICATION(S): 4 CREAM TOPICAL at 21:57

## 2024-06-20 RX ADMIN — SIMETHICONE 80 MILLIGRAM(S): 80 TABLET, CHEWABLE ORAL at 18:37

## 2024-06-20 RX ADMIN — Medication 270 MILLIGRAM(S): at 19:00

## 2024-06-20 RX ADMIN — CHLORHEXIDINE GLUCONATE 15 MILLILITER(S): 213 SOLUTION TOPICAL at 18:37

## 2024-06-20 RX ADMIN — ESCITALOPRAM OXALATE 10 MILLIGRAM(S): 10 TABLET, FILM COATED ORAL at 18:37

## 2024-06-20 RX ADMIN — QUETIAPINE FUMARATE 12.5 MILLIGRAM(S): 200 TABLET, FILM COATED ORAL at 16:28

## 2024-06-20 RX ADMIN — Medication 675 MILLIGRAM(S): at 20:00

## 2024-06-20 RX ADMIN — Medication 4 MILLILITER(S): at 18:37

## 2024-06-20 RX ADMIN — Medication 675 MILLIGRAM(S): at 19:15

## 2024-06-20 RX ADMIN — OXYCODONE HYDROCHLORIDE 5 MILLIGRAM(S): 5 TABLET ORAL at 23:46

## 2024-06-20 RX ADMIN — ZINC OXIDE 1 APPLICATION(S): 200 OINTMENT TOPICAL at 21:56

## 2024-06-20 RX ADMIN — Medication 25 MILLIGRAM(S): at 18:38

## 2024-06-20 RX ADMIN — Medication 3 MILLILITER(S): at 18:36

## 2024-06-20 NOTE — ED PROVIDER NOTE - PATIENT PORTAL LINK FT
You can access the FollowMyHealth Patient Portal offered by [facility] by registering at the following website: [email or web address]. By joining [facility]’s FollowMyHealth portal, you will also be able to view your health information using other applications (apps) compatible with our system.

## 2024-06-20 NOTE — ED ADULT NURSE NOTE - OBJECTIVE STATEMENT
73 y/o female BIB EMS with complaints of PEG tube complications, bleeding around old stoma site to her mid upper abdomen. Scant about of blood noted on dressings. Peg tub in place no s/s of infection around site, no discharge or bleeding noted. Chronic trach. C-diff positive with diarrhea. Denies CP, SOB, fevers, chills, N, V.

## 2024-06-20 NOTE — ED PROVIDER NOTE - PROGRESS NOTE DETAILS
Attending Neto Licona:  Patient was signed out to me hemodynamically stable no acute distress currently on vent.  No active oozing noted from PEG tube site.  Per signout patient's plan was to have colorectal surgery consult as well as GI.  It does not appear GI was consulted given that patient's own GI is not in town at this time.  Colorectal surgery was consulted but has not provided the recommendation yet.  Acute care surgery service states no acute surgical intervention.  Case was discussed with patient's daughter who does not want patient admitted if there will be any intervention done.  Discussed that is not possible to predict when GI will come and provide consultation.  Patient's daughter understands this but still would like to wait their recommendations.  Given patient's known GI is not available we will need to find alternate route to contacting either a partner of that GI if there is one available or [facility].  Per conversations daughter had with previous attending, tentative decision is to go home if there is no intervention that needs to occur on an inpatient basis. Will attempt to consult GI. Attending Neto Licona:  Case discussed with house GI fellow Thomas Merino. Probably try to contact patient's own GI group at 7am. This plan was communicated to patient's daughter and nurse at bedside. Attending Nteo Licona:  Case discussed with house GI fellow Thomas Merino. Probably try to contact patient's own GI group at 7am. This plan was communicated to patient's nurse at bedside. Attending Neto Licona:  daughter updated. Requested c diff be sent. Would like to know how to escalate lack of GI consult yet. Explained that I had a convo with [facility] overnight and plan that will be relayed would be to attempt to call Dr. Morgan's office in AM and if no answer, retouch base/consult our own GI. Daughter agreeable with this plan.     -> patient signed out pending remainder of w/u, close reassessments, discussion of results, dispo. Jomar Clark, DO PGY-3: Spoke with GI attending Dr. Cleaning who will see patient early this morning.  This leakage seems to be well-known to both Dr. Morgan and herself and likely a chronic issue. Jomar Clark, DO PGY-3: Patient seen and cleared by GI attending Dr. Cleaning, patient's daughter asking for rectal tube to help with patient's diarrhea, patient's daughter understands the risks of rectal tube and how to manage the rectal tube at home.  Will have nursing attempt to place rectal tube.  Patient will be discharged with nonemergent transport. Tay: Had long discussion with patient involving while it would not send her home with a rectal tube.  Reevaluated all the wounds and there was no evidence for acute infection.  Patient given the choice of admission versus discharge and patient decided for discharge.  Would like an order for her to see [facility] a nurse to work on all of the patient's wounds.  We will discuss with social work to find out if this is possible.  Nonemergent transport sent for to get the patient home. Tay: Would like wound nurse to care for 3 areas.  The first is the upper sacral wound which she has been caring for would put dry gauze on the wound itself with overlying plastic barrier.  Around the stoma would triple layer gauze and and hold in place.  Keeping this area as dry as possible but still allowing air circulation will help the skin heal.  This will need to be changed regularly possibly after each major feeding.  The area around the bottom should be kept dry with powders and cushioning to prevent moisture from irritating the skin.  Try to keep stool from sitting on the skin for 2 many hours. Ricky, PGY3 - disposition [facility]. Clark[facility]

## 2024-06-20 NOTE — ED PROVIDER NOTE - CLINICAL SUMMARY MEDICAL DECISION MAKING FREE TEXT BOX
Attending MD Pringle: ·73 yo feamale with PMH T2DM on insulin, HTN, HLD, hypothyroidism, RA on Prednisone, Fibromyalgia, cerebral aneurysm s/p repair, chronic hypercapnic respiratory failure s/p trach (vent dependent) and Chronic PEG 2022, recurrent C. diff infection (follows with Dr. Newman) presents for bloody discharge from old PEG tube site x 2 days.  .  Daughter states that patient has had persistent drainage from her old PEG tube site since hospital admission in April 1 but now bloody. Per daughter the patient has been complaining of abdominal pain above baseline so she decided to bring her to the emergency department.  On exam the lungs are clear, abd soft with mild distention.  Plan:  labs, CT abd and pelvis, GI consult and re-eval. Attending MD Pringle: ·73 yo feamale with PMH T2DM on insulin, HTN, HLD, hypothyroidism, RA on Prednisone, Fibromyalgia, cerebral aneurysm s/p repair, chronic hypercapnic respiratory failure s/p trach (vent dependent) and Chronic PEG 2022, recurrent C. diff infection (follows with Dr. Newman) presents for bloody discharge from old PEG tube site x 2 days.  .  Daughter states that patient has had persistent drainage from her old PEG tube site since hospital admission in April 1 but now bloody. Per daughter the patient has been complaining of abdominal pain above baseline so she decided to bring her to the emergency department.  On exam · Cachetic, no acute distress, lung clear, trach in place, abd soft with mild distention, upper and lower extremities contracted.  A small amount of serosanguinous drainage from old peg sit.  No pus or other signs of infection.    Plan:  labs, CT abd and pelvis, GI consult and re-eval.

## 2024-06-20 NOTE — ED PROVIDER NOTE - NS ED MD DISPO DISCHARGE
All your labs are in the acceptable range other than mild protein in the urine, which is not a cause for concern at this time.  Keep up the good work! [facility]

## 2024-06-20 NOTE — ED ADULT TRIAGE NOTE - PAIN: PRESENCE, MLM
Patient Call/ Review  Received: Today  Jacklyn Fermin Recall Nurse Review [facility],    Patient called to schedule her recall colonoscopy, please review for scheduling.      Jacklyn Ruiz   non-verbal indicators absent (Rating = 0)

## 2024-06-20 NOTE — ED PROVIDER NOTE - ATTENDING CONTRIBUTION TO CARE
Attending MD Pringle:  I personally have seen and examined this patient.  Resident note reviewed and agree on plan of care and except where noted.  Please see my MDM for further details.

## 2024-06-20 NOTE — ED PROVIDER NOTE - NSFOLLOWUPINSTRUCTIONS_ED_ALL_ED_FT
You were seen in the emergency department today and yesterday for evaluation of your feeding tube site.  You were seen by the GI doctors and there is nothing to do for that right now for tube feeds can definitely continue.   You should continue to give all medications.  The wounds on her backside do not look acutely infected at this point.  No antibiotics were started. You should be continued in care by the wound care nurse.    If things change and you become febrile or new symptoms develop please go see your doctor or come back to the emergency department.

## 2024-06-20 NOTE — ED PROVIDER NOTE - PHYSICAL EXAMINATION
Attending MD Pringle: Cachetic, no acute distress, lung clear, trach in place, abd soft with mild distention, upper and lower extremities contracted.  A small amount of serosanguinous drainage from old peg sit.  No pus or other signs of infection.

## 2024-06-20 NOTE — CONSULT NOTE ADULT - ASSESSMENT
75yo woman  h/o T2DM (4/4/24: A1C = 6.2%), HTN, HLD, anemia, hypothyroidism, RA, fibromyalgia, remote cerebral aneurysm repair, acute hypercapnic respiratory failure with tracheostomy, PEG tube (initially placed 2022), and bedbound, recurrent C diff presents to ED 6/20/24 with bleeding from previous PEG site, continued diarrhea and perirectal excoriation  Additional findings include debility, low serum alb = 3; bronchomalacia, extensive atherosclerosis, hepatic steatosis, fistular tract containing air and fluid penetrating through the abdominal wall to  the skin surface which is likely a recanalized tract at the insertion  site of the prior PEG tube and Perianal transsphincteric fistula tract extending within the left ischioanal fossa to the cutaneous  surface in the left subcutaneous perianal skin.     Suggest   continue DIFICID 200 mg twice daily (initiated as out-pt)  do not redose Cefpodoxime  GI evaluation of bleeding former PEG site  (would local cauterization help? should pt be on PPI?)  Colorectal assessment of perianal transsphincteric fistula tract extending within the left ischioanal fossa to the cutaneous  surface in the left   (is seton placement indicated?)  Rectal tube placement to promote healing of painful perirectal excoriation  wound care for perirectal wound management    I have requested approval for  out-pt administration of live biotherapeutic  ([email or web address] program)

## 2024-06-20 NOTE — ED ADULT NURSE NOTE - NSFALLUNIVINTERV_ED_ALL_ED
Bed/Stretcher in lowest position, wheels locked, appropriate side rails in place/Call bell, personal items and telephone in reach/Instruct patient to call for assistance before getting out of bed/chair/stretcher/Non-slip footwear applied when patient is off stretcher/[place] to call system/Physically safe environment - no spills, clutter or unnecessary equipment/Purposeful proactive rounding/Room/bathroom lighting operational, light cord in reach

## 2024-06-20 NOTE — ED PROVIDER NOTE - OBJECTIVE STATEMENT
Ms. Woods is a 74 ear old woman PMH T2DM on insulin, HTN, HLD, hypothyroidism, RA on Prednisone, Fibromyalgia, cerebral aneurysm s/p repair, chronic hypercapnic respiratory failure s/p trach (vent dependent) and Chronic PEG 2022, recurrent C. diff infection (follows with Dr. Newman) , bedbound who presented from home with Drainage from old PEG tube site.  Daughter and primary caretaker who is at bedside states that patient has had persistent drainage from her old PEG tube site since hospital admission in April 1 new J-tube was placed however over the last day or 2.  The output from the old PEG tract has appeared to be blood-streaked and the patient has been complaining of abdominal pain above baseline so she decided to bring her to the emergency department.  Otherwise no recent fevers, altered mental status, chills, complaints of chest pain, increasing ventilatory requirements, other complaints at this time

## 2024-06-20 NOTE — ED ADULT NURSE NOTE - NS ED NURSE DC INFO COMPLEXITY
denies drugs/caffeine Simple: Patient demonstrates quick and easy understanding/Verbalized Understanding

## 2024-06-21 ENCOUNTER — NON-APPOINTMENT (OUTPATIENT)
Age: 74
End: 2024-06-21

## 2024-06-21 VITALS
RESPIRATION RATE: 16 BRPM | TEMPERATURE: 98 F | DIASTOLIC BLOOD PRESSURE: 54 MMHG | HEART RATE: 84 BPM | SYSTOLIC BLOOD PRESSURE: 117 MMHG | OXYGEN SATURATION: 100 %

## 2024-06-21 PROBLEM — L30.8 PERISTOMAL DERMATITIS ASSOCIATED WITH MOISTURE: Status: ACTIVE | Noted: 2024-06-21

## 2024-06-21 PROBLEM — L29.0 PERIANAL IRRITATION: Status: ACTIVE | Noted: 2024-06-21

## 2024-06-21 LAB
CULTURE RESULTS: SIGNIFICANT CHANGE UP
SPECIMEN SOURCE: SIGNIFICANT CHANGE UP

## 2024-06-21 PROCEDURE — 96375 TX/PRO/DX INJ NEW DRUG ADDON: CPT

## 2024-06-21 PROCEDURE — 83605 ASSAY OF LACTIC ACID: CPT

## 2024-06-21 PROCEDURE — 85018 HEMOGLOBIN: CPT

## 2024-06-21 PROCEDURE — 82947 ASSAY GLUCOSE BLOOD QUANT: CPT

## 2024-06-21 PROCEDURE — 87086 URINE CULTURE/COLONY COUNT: CPT

## 2024-06-21 PROCEDURE — 94799 UNLISTED PULMONARY SVC/PX: CPT

## 2024-06-21 PROCEDURE — 82330 ASSAY OF CALCIUM: CPT

## 2024-06-21 PROCEDURE — 96374 THER/PROPH/DIAG INJ IV PUSH: CPT | Mod: XU

## 2024-06-21 PROCEDURE — 83735 ASSAY OF MAGNESIUM: CPT

## 2024-06-21 PROCEDURE — 82435 ASSAY OF BLOOD CHLORIDE: CPT

## 2024-06-21 PROCEDURE — 82803 BLOOD GASES ANY COMBINATION: CPT

## 2024-06-21 PROCEDURE — 94002 VENT MGMT INPAT INIT DAY: CPT

## 2024-06-21 PROCEDURE — 80053 COMPREHEN METABOLIC PANEL: CPT

## 2024-06-21 PROCEDURE — 94640 AIRWAY INHALATION TREATMENT: CPT

## 2024-06-21 PROCEDURE — 99283 EMERGENCY DEPT VISIT LOW MDM: CPT | Mod: GC

## 2024-06-21 PROCEDURE — 84295 ASSAY OF SERUM SODIUM: CPT

## 2024-06-21 PROCEDURE — 71045 X-RAY EXAM CHEST 1 VIEW: CPT

## 2024-06-21 PROCEDURE — 94003 VENT MGMT INPAT SUBQ DAY: CPT

## 2024-06-21 PROCEDURE — 85025 COMPLETE CBC W/AUTO DIFF WBC: CPT

## 2024-06-21 PROCEDURE — 82962 GLUCOSE BLOOD TEST: CPT

## 2024-06-21 PROCEDURE — 74177 CT ABD & PELVIS W/CONTRAST: CPT | Mod: MC

## 2024-06-21 PROCEDURE — 84100 ASSAY OF PHOSPHORUS: CPT

## 2024-06-21 PROCEDURE — 99284 EMERGENCY DEPT VISIT MOD MDM: CPT | Mod: 25

## 2024-06-21 PROCEDURE — 85014 HEMATOCRIT: CPT

## 2024-06-21 PROCEDURE — 81001 URINALYSIS AUTO W/SCOPE: CPT

## 2024-06-21 PROCEDURE — 84132 ASSAY OF SERUM POTASSIUM: CPT

## 2024-06-21 RX ORDER — ACETAMINOPHEN 500 MG
675 TABLET ORAL ONCE
Refills: 0 | Status: COMPLETED | OUTPATIENT
Start: 2024-06-21 | End: 2024-06-21

## 2024-06-21 RX ORDER — GABAPENTIN 400 MG/1
100 CAPSULE ORAL ONCE
Refills: 0 | Status: COMPLETED | OUTPATIENT
Start: 2024-06-21 | End: 2024-06-21

## 2024-06-21 RX ORDER — AMLODIPINE BESYLATE 2.5 MG/1
10 TABLET ORAL ONCE
Refills: 0 | Status: COMPLETED | OUTPATIENT
Start: 2024-06-21 | End: 2024-06-21

## 2024-06-21 RX ORDER — OXYCODONE HYDROCHLORIDE 5 MG/1
5 TABLET ORAL ONCE
Refills: 0 | Status: DISCONTINUED | OUTPATIENT
Start: 2024-06-21 | End: 2024-06-21

## 2024-06-21 RX ORDER — ACETYLCYSTEINE 200 MG/ML
4 VIAL (ML) MISCELLANEOUS ONCE
Refills: 0 | Status: COMPLETED | OUTPATIENT
Start: 2024-06-21 | End: 2024-06-21

## 2024-06-21 RX ORDER — LEVOTHYROXINE SODIUM 125 MCG
125 TABLET ORAL ONCE
Refills: 0 | Status: COMPLETED | OUTPATIENT
Start: 2024-06-21 | End: 2024-06-21

## 2024-06-21 RX ORDER — ACETAMINOPHEN 500 MG
975 TABLET ORAL ONCE
Refills: 0 | Status: COMPLETED | OUTPATIENT
Start: 2024-06-21 | End: 2024-06-21

## 2024-06-21 RX ORDER — IPRATROPIUM/ALBUTEROL SULFATE 18-103MCG
3 AEROSOL WITH ADAPTER (GRAM) INHALATION ONCE
Refills: 0 | Status: COMPLETED | OUTPATIENT
Start: 2024-06-21 | End: 2024-06-21

## 2024-06-21 RX ORDER — DIPHENHYDRAMINE HCL 50 MG
25 CAPSULE ORAL ONCE
Refills: 0 | Status: DISCONTINUED | OUTPATIENT
Start: 2024-06-21 | End: 2024-06-21

## 2024-06-21 RX ORDER — DIPHENHYDRAMINE HCL 50 MG
25 CAPSULE ORAL ONCE
Refills: 0 | Status: COMPLETED | OUTPATIENT
Start: 2024-06-21 | End: 2024-06-21

## 2024-06-21 RX ORDER — GABAPENTIN 400 MG/1
100 CAPSULE ORAL ONCE
Refills: 0 | Status: DISCONTINUED | OUTPATIENT
Start: 2024-06-21 | End: 2024-06-21

## 2024-06-21 RX ORDER — PANTOPRAZOLE SODIUM 20 MG/1
40 TABLET, DELAYED RELEASE ORAL ONCE
Refills: 0 | Status: COMPLETED | OUTPATIENT
Start: 2024-06-21 | End: 2024-06-21

## 2024-06-21 RX ADMIN — GABAPENTIN 100 MILLIGRAM(S): 400 CAPSULE ORAL at 10:40

## 2024-06-21 RX ADMIN — AMLODIPINE BESYLATE 10 MILLIGRAM(S): 2.5 TABLET ORAL at 10:41

## 2024-06-21 RX ADMIN — Medication 975 MILLIGRAM(S): at 13:46

## 2024-06-21 RX ADMIN — OXYCODONE HYDROCHLORIDE 5 MILLIGRAM(S): 5 TABLET ORAL at 13:45

## 2024-06-21 RX ADMIN — Medication 125 MICROGRAM(S): at 08:52

## 2024-06-21 RX ADMIN — Medication 5 MILLIGRAM(S): at 10:40

## 2024-06-21 RX ADMIN — SODIUM CHLORIDE 100 MILLILITER(S): 9 INJECTION INTRAMUSCULAR; INTRAVENOUS; SUBCUTANEOUS at 14:17

## 2024-06-21 RX ADMIN — Medication 25 MILLIGRAM(S): at 10:39

## 2024-06-21 RX ADMIN — CHLORHEXIDINE GLUCONATE 15 MILLILITER(S): 213 SOLUTION TOPICAL at 06:23

## 2024-06-21 RX ADMIN — PANTOPRAZOLE SODIUM 40 MILLIGRAM(S): 20 TABLET, DELAYED RELEASE ORAL at 07:20

## 2024-06-21 RX ADMIN — ESCITALOPRAM OXALATE 10 MILLIGRAM(S): 10 TABLET, FILM COATED ORAL at 13:45

## 2024-06-21 NOTE — CONSULT NOTE ADULT - SUBJECTIVE AND OBJECTIVE BOX
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 1000 milliLiter(s) (100 mL/Hr) IV Continuous <Continuous>      ALLERGIES: NKDA    REVIEW OF SYSTEMS: All ROS negative except as per HPI.  ____________________________________________    VITALS:T(C): 36.9, Max: 37.4 ()  T(F): 98.5, Max: 99.3 ()  HR: 85 (77 - 99)  BP: 136/62 (102/47 - 156/52)  BP(mean): 83 (75 - 89)  RR: 34 (12 - 34)  SpO2: 100% (98% - 100%) tracheostomy collar         PHYSICAL EXAM:  General: AAOx3, no acute distress.  Respiratory: breathing comfortably, no increased WOB   Abdomen: soft, nontender, nondistended, no rebound, no guarding  Extremities: Moves all four.   ____________________________________________    LABS:                      8.8  10.37 )-----------( 171    ( 2024 12:19 )             31.1  137  |  98  |  35<H>  ----------------------------<  170<H>    ()  4.9   |  29  |  <0.30<L>          Ca    9.2      06-20  Mg    2.0  Phos  4.1        LIVER FUNCTIONS - ( 2024 12:19 )  Alb: 3.0 g/dL / Pro: 7.6 g/dL / ALK PHOS: 82 U/L / ALT: 68 U/L / AST: 76 U/L / GGT: x        Urinalysis Basic - ( 2024 13:23 )    Color: Yellow / Appearance: Clear / S.017 / pH: x  Gluc: x / Ketone: Trace mg/dL  / Bili: Negative / Urobili: 0.2 mg/dL   Blood: x / Protein: Negative mg/dL / Nitrite: Negative   Leuk Esterase: Trace / RBC: 2 /HPF / WBC 4 /HPF   Sq Epi: x / Non Sq Epi: 0 /HPF / Bacteria: Negative /HPF      ____________________________________________    RADIOLOGY & ADDITIONAL STUDIES: GENERAL SURGERY CONSULT NOTE    HPI: 73 yo F PMH T2DM on insulin, HTN, HLD, hypothyroidism, RA on Prednisone, Fibromyalgia, cerebral aneurysm s/p repair, acute hypercapnic respiratory failure s/p trach (vent dependent) and PEG (10/22), 4/3/2024 - 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 No increase in drainage, fistula has continued to drain despite fistula closure attempt.     In the ED, patient was afebrile, VSS, WBC 10, lactate 2.      Surgery consulted for evaluation fo gastrocutaneous fistula.     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 1000 milliLiter(s) (100 mL/Hr) IV Continuous <Continuous>      ALLERGIES: NKDA    REVIEW OF SYSTEMS: All ROS negative except as per HPI.  ____________________________________________    VITALS:T(C): 36.9, Max: 37.4 (06-20)  T(F): 98.5, Max: 99.3 (06-20)  HR: 85 (77 - 99)  BP: 136/62 (102/47 - 156/52)  BP(mean): 83 (75 - 89)  RR: 34 (12 - 34)  SpO2: 100% (98% - 100%) tracheostomy collar         PHYSICAL EXAM:  General: AAOx3, no acute distress.  Respiratory: breathing comfortably, no increased WOB. Trach  Abdomen: soft, +epigastric TTP, nondistended, no rebound, no guarding  - GJ tube site normal, no drainage.   - Gastrocutaneous fistula draining mucus with excoriations around entry site.   Extremities: Moves all four.   ____________________________________________    LABS:                      8.8  10.37 )-----------( 171    ( 2024 12:19 )             31.1  137  |  98  |  35<H>  ----------------------------<  170<H>    ()  4.9   |  29  |  <0.30<L>          Ca    9.2        Mg    2.0  Phos  4.1        LIVER FUNCTIONS - ( 2024 12:19 )  Alb: 3.0 g/dL / Pro: 7.6 g/dL / ALK PHOS: 82 U/L / ALT: 68 U/L / AST: 76 U/L / GGT: x        Urinalysis Basic - ( 2024 13:23 )    Color: Yellow / Appearance: Clear / S.017 / pH: x  Gluc: x / Ketone: Trace mg/dL  / Bili: Negative / Urobili: 0.2 mg/dL   Blood: x / Protein: Negative mg/dL / Nitrite: Negative   Leuk Esterase: Trace / RBC: 2 /HPF / WBC 4 /HPF   Sq Epi: x / Non Sq Epi: 0 /HPF / Bacteria: Negative /HPF      ____________________________________________    RADIOLOGY & ADDITIONAL STUDIES:    < from: CT Abdomen and Pelvis w/ IV Cont (24 @ 16:56) >  FINDINGS:  LOWER CHEST: Partially visualized endotracheal tube ends shortly above   the nick. There is flattening of both main bronchi which can be seen   with bronchomalacia. Partially visualized pulmonary trunk measures normal   caliber at 2.8 cm, the mid ascending thoracic aorta normal at 3.0 cm.   There is no central pulmonary arterial filling defect. Visualized lung   demonstrates mosaic attenuation and linear atelectatic changes in both   lower lobes whichare slightly improved compared to prior study   2024. Coronary artery calcifications.    LIVER: Hepatic steatosis. No focal liver lesions.  BILE DUCTS: Normal caliber.  GALLBLADDER: Cholelithiasis.  SPLEEN: Within normal limits.  PANCREAS: Within normal limits.  ADRENALS: Within normal limits.  KIDNEYS/URETERS: No renal stones or hydronephrosis. Normal renal   parenchymal enhancement.    BLADDER: Within normal limits.  REPRODUCTIVE ORGANS: Uterus and left adnexa within normal limits. Right   adnexal 2.0 cm unilocular cyst unchanged since 2024    BOWEL: Radiopaque line , possibly a suture, along the greater curvature   of the stomach. Originating from the gastric body there is a fistular   tract containing air and fluid penetrating through the abdominal wall to   the skin surface which is likely a recanalized tract at the insertion   site of the prior PEG tube. The gastrojejunostomy tube enters the   anterior abdominal wall and the gastric antrum with the tip ending in the   distal jejunum. No bowel obstruction. Appendix is not visualized. No   evidence of inflammation in the pericecal region. Small amount of liquid   stool within the colon without distention. Perianal transsphincteric   fistula tract extending within the left ischioanal fossa to the cutaneous   surface in the left subcutaneous perianal skin. Partially visualized   wound material in the distal tract.  PERITONEUM/RETROPERITONEUM: No pneumoperitoneum. No abnormal fluid   collections in the abdomen and pelvis, no ascites.  VESSELS: Severe atherosclerotic changes of the abdominal aorta. Patent   celiac axis, SMA and ERMELINDA with large calcific plaques at the origin off   the SMA and both renal arteries with probable stenosis.  LYMPH NODES: No lymphadenopathy.  ABDOMINAL WALL: Gastrojejunostomy tube in the mid abdomen and   gastro-cutaneous fistula at the prior PEG tube site slightly to the left   of midline. Tiny fat-containing umbilical hernia.  BONES: Chronic, consolidated left anterolateral rib fractures. Multilevel   compression deformities involving T9, T11-L5 are unchanged since   2024.    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    Correct position of the gastrojejunostomy.    No pneumoperitoneum or abdominalfluid collections.    Intubation. Bilateral linear segmental atelectatic changes,   superinfection is not entirely excluded.    < end of copied text >

## 2024-06-21 NOTE — PHYSICAL EXAM
[Skin Ulcer] : ulcer [Alert] : alert [Calm] : calm [Please See PDF for [employer] Analytics] : Please See PDF for [employer]. [de-identified] : NAD, well groomed, sitting up in bed [de-identified] : c difficile [de-identified] : bedbound

## 2024-06-21 NOTE — PLAN
[FreeTextEntry1] : 6 /14/24 Plan - orders to be e mailed to daughter bed settings - should never be on static except if changing linens and turning pt. Pt. would benefit from short term use of rectal tube, so as to give rodrigo anal skin a chance to be without constant irritation from stooling Sacral - advised to use dove liquid soap, unscented or skin sensitive, or baby shampoo to cleanse with, pack with aquacel, cover, rodrigo wound crusting technique explained, using cavilon/adapt powder then repeat 2-3 x so as to crust rodrigo wound, only use kaltostat small piece if oozing does not stop, may need silver nitrate to edges Rodrigo wound of feeding tube - cleanse soap and water, cavilon, drawtex super absorber to draining area/abdominal pad over Rodrigo anal - wash soap and water, may continue with mixture of zinc (thin layer), milk of magnesia, nystatin powder, cover draining area with aquacel offload/supplements follow up 2 weeks TEB

## 2024-06-21 NOTE — ED ADULT NURSE REASSESSMENT NOTE - NS ED NURSE REASSESS COMMENT FT1
Pt cleaned, repositioned. VS WDL. Pt TBA. Stretcher locked and in lowest position, appropriate side rails up. Pt instructed to notify RN if assistance is needed.

## 2024-06-21 NOTE — REASON FOR VISIT
[Consultation] : a consultation visit [Family Member] : family member [FreeTextEntry1] : sacral/perineal/g tube

## 2024-06-21 NOTE — CHART NOTE - NSCHARTNOTEFT_GEN_A_CORE
ED SW-    LMSW consulted by ED MD for patient in ED presenting with Drainage from old PEG tube site and in need of wound care for d/c. Per chart, "Patient is a 74 year old woman PMH T2DM on insulin, HTN, HLD, hypothyroidism, RA on Prednisone, Fibromyalgia, cerebral aneurysm s/p repair, chronic hypercapnic respiratory failure s/p trach (vent dependent) and Chronic PEG 2022, recurrent C. diff infection (follows with Dr. Newman) , bedbound who presented from home with Drainage from old PEG tube site.  Daughter and primary caretaker who is at bedside..."    LMSW met with daughter at bedside to introduce self and role. Daughter verbalized understanding and confirmed demographics. Daughter confirmed she is primary caretaker Marysol Woods [phone]. Daughter stated patient is active with [facility] services [phone]. LMSW informed patient new wound care orders will be sent to [facility]. Daughter inquired if LMSW can refer patient to a home wound care specialist. LMSW provided patient with [facility] programs resource advising daughter to contact to determine if patient qualifies and can obtain home wound care MD. Daughter appreciative.    LMSW sent home care resumption referral to [facility] requesting SOC tomorrow. Per Celina via [facility], patient is accepted and services to resume tomorrow. ED MD made aware. ED MD completed non emergent for ALS ambulance transport. Patient d/c'ed home with daughter via [facility] with resumption of [facility] home care. No further SW needs. SW to remain available if needs arise.

## 2024-06-21 NOTE — ED ADULT NURSE REASSESSMENT NOTE - NS ED NURSE REASSESS COMMENT FT1
Oral care performed. Pt tolerated well. Pt to be seen by surgery team and gastroenterology. Stretcher locked and in lowest position, appropriate side rails up. Pt instructed to notify RN if assistance is needed.

## 2024-06-21 NOTE — ED ADULT NURSE REASSESSMENT NOTE - NS ED NURSE REASSESS COMMENT FT1
Received report from HANNAH Nelson. Pt is awake and alert, resting comfortably in stretcher, answering questions appropriately. Pt remains on mechanical ventilator, home health aid at bedside. Pt awaiting GI consult and dispo.

## 2024-06-21 NOTE — PROGRESS NOTE ADULT - SUBJECTIVE AND OBJECTIVE BOX
Follow Up:  peg site leakage, cdiff    Interval History/ROS:  sleeping on vent,, decreased stools off of feeds    Allergies  Tagamet (Unknown)  walnut (Unknown)  pineapple (Unknown)  Lyrica (Unknown)  heparin (Unknown)  penicillin (Unknown)  metronidazole (Rash)  Pecan, Filbert, Hazelnut (Unknown)  meropenem (Rash)      ANTIMICROBIALS:      OTHER MEDS:  MEDICATIONS  (STANDING):  acetaminophen   IVPB .. 675 Once  acetylcysteine 10%  Inhalation 4 Once  albuterol/ipratropium for Nebulization. 3 once  escitalopram 10 daily  oxyCODONE    Solution 5 Once      Vital Signs Last 24 Hrs  T(C): 36.8 (2024 13:45), Max: 36.9 (2024 22:30)  T(F): 98.2 (2024 13:45), Max: 98.5 (2024 22:30)  HR: 90 (2024 14:53) (73 - 105)  BP: 146/69 (2024 14:00) (115/57 - 156/71)  BP(mean): 88 (2024 14:00) (75 - 96)  RR: 17 (2024 14:00) (13 - 34)  SpO2: 100% (2024 14:53) (100% - 100%)    Parameters below as of 2024 14:00  Patient On (Oxygen Delivery Method): tracheostomy collar        PHYSICAL EXAM:  General:  NAD, Non-toxic  Neurology: fatigued, nonfocal  Respiratory: trach  in place, Clear to auscultation bilaterally  CV: RRR, S1S2, no murmurs, rubs or gallops  Abdominal: Soft, Non-tender,distended,   Extremities: No edema, + peripheral pulses  Line Sites: Clear  Skin: No rash                        8.8    10.37 )-----------( 171      ( 2024 12:19 )             31.1       06-20    137  |  98  |  35<H>  ----------------------------<  170<H>  4.9   |  29  |  <0.30<L>    Ca    9.2      2024 12:19  Phos  4.1     06-20  Mg     2.0     06-20    TPro  7.6  /  Alb  3.0<L>  /  TBili  0.3  /  DBili  x   /  AST  76<H>  /  ALT  68<H>  /  AlkPhos  82        Urinalysis Basic - ( 2024 13:23 )    Color: Yellow / Appearance: Clear / S.017 / pH: x  Gluc: x / Ketone: Trace mg/dL  / Bili: Negative / Urobili: 0.2 mg/dL   Blood: x / Protein: Negative mg/dL / Nitrite: Negative   Leuk Esterase: Trace / RBC: 2 /HPF / WBC 4 /HPF   Sq Epi: x / Non Sq Epi: 0 /HPF / Bacteria: Negative /HPF    MICROBIOLOGY:      RADIOLOGY:  < from: Xray Chest 1 View- PORTABLE-Urgent (Xray Chest 1 View- PORTABLE-Urgent .) (24 @ 18:54) >    FINDINGS:  Redemonstrated tracheostomy tube.  Trace bilateral pleural effusions.  There is no pneumothorax.  The heart is normal in size  The visualized osseous structures demonstrate no acute pathology.    IMPRESSION:  Small bilateral pleural effusions.    < end of copied text >  < from: CT Abdomen and Pelvis w/ IV Cont (24 @ 16:56) >  FINDINGS:  LOWER CHEST: Partially visualized endotracheal tube ends shortly above   the nick. There is flattening of both main bronchi which can be seen   with bronchomalacia. Partially visualized pulmonary trunk measures normal   caliber at 2.8 cm, the mid ascending thoracic aorta normal at 3.0 cm.   There is no central pulmonary arterial filling defect. Visualized lung   demonstrates mosaic attenuation and linear atelectatic changes in both   lower lobes whichare slightly improved compared to prior study   2024. Coronary artery calcifications.    LIVER: Hepatic steatosis. No focal liver lesions.  BILE DUCTS: Normal caliber.  GALLBLADDER: Cholelithiasis.  SPLEEN: Within normal limits.  PANCREAS: Within normal limits.  ADRENALS: Within normal limits.  KIDNEYS/URETERS: No renal stones or hydronephrosis. Normal renal   parenchymal enhancement.    BLADDER: Within normal limits.  REPRODUCTIVE ORGANS: Uterus and left adnexa within normal limits. Right   adnexal 2.0 cm unilocular cyst unchanged since 2024    BOWEL: Radiopaque line , possibly a suture, along the greater curvature   of the stomach. Originating from the gastric body there is a fistular   tract containing air and fluid penetrating through the abdominal wall to   the skin surface which is likely a recanalized tract at the insertion   site of the prior PEG tube. The gastrojejunostomy tube enters the   anterior abdominal wall and the gastric antrum with the tip ending in the   distal jejunum. No bowel obstruction. Appendix is not visualized. No   evidence of inflammation in the pericecal region. Small amount of liquid   stool within the colon without distention. Perianal transsphincteric   fistula tract extending within the left ischioanal fossa to the cutaneous   surface in the left subcutaneous perianal skin. Partially visualized   wound material in the distal tract.  PERITONEUM/RETROPERITONEUM: No pneumoperitoneum. No abnormal fluid   collections in the abdomen and pelvis, no ascites.  VESSELS: Severe atherosclerotic changes of the abdominal aorta. Patent   celiac axis, SMA and ERMELINDA with large calcific plaques at the origin off   the SMA and both renal arteries with probable stenosis.  LYMPH NODES: No lymphadenopathy.  ABDOMINAL WALL: Gastrojejunostomy tube in the mid abdomen and   gastro-cutaneous fistula at the prior PEG tube site slightly to the left   of midline. Tiny fat-containing umbilical hernia.  BONES: Chronic, consolidated left anterolateral rib fractures. Multilevel   compression deformities involving T9, T11-L5 are unchanged since   2024.    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    Correct position of the gastrojejunostomy.    No pneumoperitoneum or abdominalfluid collections.    Intubation. Bilateral linear segmental atelectatic changes,   superinfection is not entirely excluded.    < end of copied text >      Zion Newman MD; Division of Infectious Disease; Pager: [phone]; nights and weekends: [phone]

## 2024-06-21 NOTE — CONSULT NOTE ADULT - ATTENDING COMMENTS
Patient examined by me in ED.   74 year old woman presenting with CC of back pain and bleeding from old PEG site.  Patient has hx of cerebral aneurysm s/p repair. Remains trach/vent dependent.   S/p EGD-assisted suture closure of gastrocutaneous fistula, PEG-J placement with Dr. Estrada on 4/16/2024.   Patient reports persistent drainage from old PEG site since discharge.   Able to tolerate tube feeds.     Hemodynamically stable.   Following commands.   Abdomen non distended. Mild tenderness to palpation over LUQ.   LUQ gastrocutaneous fistula at old PEG site draining gastric contents. Surround skin excoriation and erythema. No blood.    WBC 10.   LA 2.   Electrolytes grossly unremarkable.     CT Abdomen Pelvis:   -Gastrocutaneous fistula at the site of the prior PEG tube.  -0Correct position of the gastrojejunostomy.  -No pneumoperitoneum or abdominal fluid collections.    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 Patient remains HD stable with no evidence of bleeding while being observed in ED. No indication for emergent/urgent surgical intervention at this time. Recommend pouching fistula to control drainage and prevent further skin excoriation. Patient may follow up with Dr. Estrada as outpatient for planning elective fistula takedown.   -Patient being followed by ID as outpatient for C. Diff. Complains of copious and worsening watery diarrhea. Recommend medical evaluation. Patient examined by me in ED.   74 year old woman presenting with CC of back pain and bleeding from old PEG site.  Patient has hx of cerebral aneurysm s/p repair. Remains trach/vent dependent.   S/p EGD-assisted suture closure of gastrocutaneous fistula, PEG-J placement with Dr. Estrada on 4/16/2024.   Patient reports persistent drainage from old PEG site since discharge.   Able to tolerate tube feeds.     Hemodynamically stable.   Following commands.   Abdomen non distended. Mild tenderness to palpation over LUQ.   LUQ gastrocutaneous fistula at old PEG site draining gastric contents. Surround skin excoriation and erythema. No blood.    WBC 10.   LA 2.   Electrolytes grossly unremarkable.     CT Abdomen Pelvis:   -Gastrocutaneous fistula at the site of the prior PEG tube.  -0Correct position of the gastrojejunostomy.  -No pneumoperitoneum or abdominal fluid collections.    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 Patient remains HD stable with no evidence of bleeding while being observed in ED. Abdominal pain likely related skin excoriation. No indication for emergent/urgent surgical intervention at this time. Recommend pouching fistula to control drainage and prevent further skin excoriation. Patient may follow up with Dr. Estrada as outpatient for planning elective fistula takedown.   -Patient being followed by ID as outpatient for C. Diff. Complains of copious and worsening watery diarrhea. Recommend medical evaluation.

## 2024-06-21 NOTE — CHART NOTE - NSCHARTNOTEFT_GEN_A_CORE
75yo woman  h/o T2DM (4/4/24: A1C = 6.2%), HTN, HLD, anemia, hypothyroidism, RA, fibromyalgia, remote cerebral aneurysm repair, acute hypercapnic respiratory failure with tracheostomy, PEG tube (initially placed 2022), and bedbound, recurrent C diff presented for prior PEG stoma/fistula leakage issues and loose stools. MICU evaluated the patient stoma site and spoke with her caregivers. At this time, a definite plan for stoma/fistula from PEG dislodgement from consulted services ie Gen Surgery, Colon Rectal Surgery and GI needs to be explored and delineated prior to admission to this hospital. Family member and caregivers voiced their understanding and would be okay to go home if no immediate inpatient plans. Please reconsult MICU for admission if needed.     Case discussed with MICU attending. 73yo woman  h/o T2DM (4/4/24: A1C = 6.2%), HTN, HLD, anemia, hypothyroidism, RA, fibromyalgia, remote cerebral aneurysm repair, acute hypercapnic respiratory failure with tracheostomy, PEG tube (initially placed 2022), and bedbound, recurrent C diff presented for prior PEG stoma/fistula leakage issues and loose stools. MICU evaluated the patient stoma site and spoke with her caregivers. At this time, a definite plan for this PEG stoma/fistula from consulted services ie Gen Surgery, Colon Rectal Surgery and GI needs to be explored and delineated prior to admission to this hospital. Family member and caregivers voiced their understanding and would be okay to go home if no immediate inpatient plans. Please reconsult MICU for admission if needed.     Case discussed with MICU attending. 73yo woman  h/o T2DM (4/4/24: A1C = 6.2%), HTN, HLD, anemia, hypothyroidism, RA, fibromyalgia, remote cerebral aneurysm repair, acute hypercapnic respiratory failure with tracheostomy, PEG tube (initially placed 2022), and bedbound, recurrent C diff presented for prior PEG stoma/fistula leakage issues and loose stools. MICU evaluated the patient stoma site and spoke with her caregivers. At this time, a definitive plan for this PEG stoma/fistula from consulted services ie Gen Surgery, Colon Rectal Surgery and GI needs to be explored and delineated prior to admission to this hospital. Family member and caregivers voiced their understanding and would be okay to go home if no immediate inpatient plans. Please reconsult MICU for admission if needed.     Case discussed with MICU attending. 73yo woman  h/o T2DM (4/4/24: A1C = 6.2%), HTN, HLD, anemia, hypothyroidism, RA, fibromyalgia, remote cerebral aneurysm repair, acute hypercapnic respiratory failure with tracheostomy, PEG tube (initially placed 2022), and bedbound, recurrent C diff presented for prior PEG stoma/fistula leakage issues and loose stools. MICU evaluated the patient stoma site and spoke with her caregivers. At this time, a definitive plan for this PEG stoma/fistula from consulted services ie Gen Surgery, Colon Rectal Surgery and GI needs to be explored and delineated prior to admission to this hospital. Family member and caregivers voiced their understanding and would be okay to go home if no immediate inpatient plans. Please reconsult MICU for admission if needed.     Case discussed with MICU attending.                -------------Attending attestation---------------  No issue admittign patient on to our service and maintaining her in stable condition pending surgical/endoscopic evaluation of fistulized peg tract.   However would like to know if inpatient treatment is planned prior to admition fo this.  IF not or if no plan for acute intervention patients family would reasonably prefer to wait at home for scheduled treatment.  - for now please place moisture barrier cream to the michelle sorrounding the site and replace the ostomy bag.  - call back if plan for admission

## 2024-06-21 NOTE — CONSULT NOTE ADULT - ASSESSMENT
73 yo F PMH T2DM on insulin, HTN, HLD, hypothyroidism, RA on Prednisone, Fibromyalgia, cerebral aneurysm s/p repair, acute hypercapnic respiratory failure s/p trach (vent dependent) and PEG (10/22), s/p EGD-assisted sutured closure of gastrocutaneous fistula, PEG-J placement. Here for increased old PEG site fistula drainage. Colorectal surgery consulted for perirectal fistula found on CT scan. Fistula has been noted for over 20 years, not bothering patient    PLAN  - No acute surgical intervention at this time  - Please have pt follow-up with Dr. Aguillon in the office    Discussed with senior on behalf of Dr. [facility]  [phone] (pager)

## 2024-06-21 NOTE — REVIEW OF SYSTEMS
[Diarrhea] : diarrhea [Skin Wound] : skin wound [As Noted in HPI] : as noted in HPI [Negative] : Constitutional [FreeTextEntry6] : trach to vent. duoneb and mucomyst [FreeTextEntry7] : c -difficile, tube feeds [FreeTextEntry9] : history RA [de-identified] : alert

## 2024-06-21 NOTE — CONSULT NOTE ADULT - ASSESSMENT
Plan  - No acute surgical intervention at this time  - Pouch old PEG fistula to   - Colorectal consultation for chronic perianal transsphincteric fistula     Discussed with Dr. 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     Plan  - No acute surgical intervention at this time  - Pouch old PEG fistula to monitor output  - Colorectal consultation for chronic perianal transsphincteric fistula     Discussed with Dr. Herrera    ACS/Trauma  [phone] (pager)

## 2024-06-21 NOTE — CONSULT NOTE ADULT - SUBJECTIVE AND OBJECTIVE BOX
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 No increase in drainage, fistula has continued to drain.     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 1000 milliLiter(s) (100 mL/Hr) IV Continuous <Continuous>      ALLERGIES: NKDA    REVIEW OF SYSTEMS: All ROS negative except as per HPI.  ____________________________________________    VITALS:T(C): 36.9, Max: 37.4 (06-20)  T(F): 98.5, Max: 99.3 (06-20)  HR: 85 (77 - 99)  BP: 136/62 (102/47 - 156/52)  BP(mean): 83 (75 - 89)  RR: 34 (12 - 34)  SpO2: 100% (98% - 100%) tracheostomy collar         PHYSICAL EXAM:  General: AAOx3, no acute distress.  Respiratory: breathing comfortably, no increased WOB. Trach  Abdomen: soft, +epigastric TTP, nondistended, no rebound, no guarding  - GJ tube site normal, no drainage.   - Gastrocutaneous fistula draining mucus with excoriations around entry site.   Extremities: Moves all four.   BACK: Sacral ulcer, clean base, no active drainage or malodorous discharge  RECTAL  - No masses on LARON  - Perianal fistula tract left posterolateral, no drainage or pus or signs of infection. Not TTP.     ____________________________________________    LABS:                      8.8  10.37 )-----------( 171    ( 2024 12:19 )             31.1  137  |  98  |  35<H>  ----------------------------<  170<H>    ()  4.9   |  29  |  <0.30<L>          Ca    9.2        Mg    2.0  Phos  4.1        LIVER FUNCTIONS - ( 2024 12:19 )  Alb: 3.0 g/dL / Pro: 7.6 g/dL / ALK PHOS: 82 U/L / ALT: 68 U/L / AST: 76 U/L / GGT: x        Urinalysis Basic - ( 2024 13:23 )    Color: Yellow / Appearance: Clear / S.017 / pH: x  Gluc: x / Ketone: Trace mg/dL  / Bili: Negative / Urobili: 0.2 mg/dL   Blood: x / Protein: Negative mg/dL / Nitrite: Negative   Leuk Esterase: Trace / RBC: 2 /HPF / WBC 4 /HPF   Sq Epi: x / Non Sq Epi: 0 /HPF / Bacteria: Negative /HPF      ____________________________________________    RADIOLOGY & ADDITIONAL STUDIES:    < from: CT Abdomen and Pelvis w/ IV Cont (24 @ 16:56) >  FINDINGS:  LOWER CHEST: Partially visualized endotracheal tube ends shortly above   the nick. There is flattening of both main bronchi which can be seen   with bronchomalacia. Partially visualized pulmonary trunk measures normal   caliber at 2.8 cm, the mid ascending thoracic aorta normal at 3.0 cm.   There is no central pulmonary arterial filling defect. Visualized lung   demonstrates mosaic attenuation and linear atelectatic changes in both   lower lobes whichare slightly improved compared to prior study   2024. Coronary artery calcifications.    LIVER: Hepatic steatosis. No focal liver lesions.  BILE DUCTS: Normal caliber.  GALLBLADDER: Cholelithiasis.  SPLEEN: Within normal limits.  PANCREAS: Within normal limits.  ADRENALS: Within normal limits.  KIDNEYS/URETERS: No renal stones or hydronephrosis. Normal renal   parenchymal enhancement.    BLADDER: Within normal limits.  REPRODUCTIVE ORGANS: Uterus and left adnexa within normal limits. Right   adnexal 2.0 cm unilocular cyst unchanged since 2024    BOWEL: Radiopaque line , possibly a suture, along the greater curvature   of the stomach. Originating from the gastric body there is a fistular   tract containing air and fluid penetrating through the abdominal wall to   the skin surface which is likely a recanalized tract at the insertion   site of the prior PEG tube. The gastrojejunostomy tube enters the   anterior abdominal wall and the gastric antrum with the tip ending in the   distal jejunum. No bowel obstruction. Appendix is not visualized. No   evidence of inflammation in the pericecal region. Small amount of liquid   stool within the colon without distention. Perianal transsphincteric   fistula tract extending within the left ischioanal fossa to the cutaneous   surface in the left subcutaneous perianal skin. Partially visualized   wound material in the distal tract.  PERITONEUM/RETROPERITONEUM: No pneumoperitoneum. No abnormal fluid   collections in the abdomen and pelvis, no ascites.  VESSELS: Severe atherosclerotic changes of the abdominal aorta. Patent   celiac axis, SMA and ERMELINDA with large calcific plaques at the origin off   the SMA and both renal arteries with probable stenosis.  LYMPH NODES: No lymphadenopathy.  ABDOMINAL WALL: Gastrojejunostomy tube in the mid abdomen and   gastro-cutaneous fistula at the prior PEG tube site slightly to the left   of midline. Tiny fat-containing umbilical hernia.  BONES: Chronic, consolidated left anterolateral rib fractures. Multilevel   compression deformities involving T9, T11-L5 are unchanged since   2024.    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    Correct position of the gastrojejunostomy.    No pneumoperitoneum or abdominalfluid collections.    Intubation. Bilateral linear segmental atelectatic changes,   superinfection is not entirely excluded.    < end of copied text >

## 2024-06-21 NOTE — CONSULT NOTE ADULT - SUBJECTIVE AND OBJECTIVE BOX
Chief Complaint:  Patient is a 74y old  Female who presents with a chief complaint of bleeding from previous PEG site (2024 19:36)      Date of service: 24 @ 09:53    HPI:    The patient is a     The patient denies dysphagia, nausea and vomiting, abdominal pain, diarrhea, unintentional weight loss, change in bowel habits or NSAID use.      Allergies:  Tagamet (Unknown)  walnut (Unknown)  pineapple (Unknown)  Lyrica (Unknown)  heparin (Unknown)  penicillin (Unknown)  metronidazole (Rash)  Pecan, Filbert, Hazelnut (Unknown)  meropenem (Rash)      Home Medications:    Hospital Medications:  amLODIPine   Tablet 10 milliGRAM(s) Oral Once  chlorhexidine 0.12% Liquid 15 milliLiter(s) Oral Mucosa every 12 hours  diphenhydrAMINE Elixir 25 milliGRAM(s) Oral Once  escitalopram 10 milliGRAM(s) Oral daily  gabapentin Solution 100 milliGRAM(s) Oral once  predniSONE   Tablet 5 milliGRAM(s) Oral Once  sodium chloride 0.9%. 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  Denies illicit drug use.    ROS:     General:  No wt loss, fevers, chills, night sweats, fatigue,   Eyes:  Good vision, no reported pain  ENT:  No sore throat, pain, runny nose, dysphagia  CV:  No pain, palpitations, hypo/hypertension  Resp:  No dyspnea, cough, tachypnea, wheezing  GI:  See HPI  :  No pain, bleeding, incontinence, nocturia  Muscle:  No pain, weakness  Neuro:  No weakness, tingling, memory problems  Psych:  No fatigue, insomnia, mood problems, depression  Endocrine:  No polyuria, polydipsia, cold/heat intolerance  Heme:  No petechiae, ecchymosis, easy bruisability  Integumentary:  No rash, edema      PHYSICAL EXAM:     GENERAL:  Appears stated age, well-groomed, well-nourished, no distress  HEENT:  NC/AT,  conjunctivae anicteric, clear and pink,   NECK: supple, trachea midline  CHEST:  Full & symmetric excursion, no increased effort, breath sounds clear  HEART:  Regular rhythm, no JVD  ABDOMEN:  Soft, non-tender, non-distended, normoactive bowel sounds,  no masses , no hepatosplenomegaly  EXTREMITIES:  no cyanosis,clubbing or edema  SKIN:  No rash, erythema, or, ecchymoses, no jaundice  NEURO:  Alert, non-focal, no asterixis  PSYCH: Appropriate affect, oriented to place and time  RECTAL: Deferred      Vital Signs:  Vital Signs Last 24 Hrs  T(C): 36.6 (2024 07:10), Max: 37.4 (2024 11:41)  T(F): 97.8 (2024 07:10), Max: 99.3 (2024 11:41)  HR: 94 (2024 08:50) (73 - 99)  BP: 136/58 (2024 07:10) (102/47 - 156/52)  BP(mean): 78 (2024 07:10) (75 - 89)  RR: 16 (2024 07:10) (12 - 34)  SpO2: 100% (2024 08:50) (98% - 100%)    Parameters below as of 2024 07:10  Patient On (Oxygen Delivery Method): tracheostomy collar      Daily Height in cm: 152.4 (2024 11:41)    Daily     LABS: Labs personally reviewed by me:                        8.8    10.37 )-----------( 171      ( 2024 12:19 )             31.1     06-20    137  |  98  |  35<H>  ----------------------------<  170<H>  4.9   |  29  |  <0.30<L>    Ca    9.2      2024 12:19  Phos  4.1     06-20  Mg     2.0     -20    TPro  7.6  /  Alb  3.0<L>  /  TBili  0.3  /  DBili  x   /  AST  76<H>  /  ALT  68<H>  /  AlkPhos  82  06-20    LIVER FUNCTIONS - ( 2024 12:19 )  Alb: 3.0 g/dL / Pro: 7.6 g/dL / ALK PHOS: 82 U/L / ALT: 68 U/L / AST: 76 U/L / GGT: x             Urinalysis Basic - ( 2024 13:23 )    Color: Yellow / Appearance: Clear / S.017 / pH: x  Gluc: x / Ketone: Trace mg/dL  / Bili: Negative / Urobili: 0.2 mg/dL   Blood: x / Protein: Negative mg/dL / Nitrite: Negative   Leuk Esterase: Trace / RBC: 2 /HPF / WBC 4 /HPF   Sq Epi: x / Non Sq Epi: 0 /HPF / Bacteria: Negative /HPF          Imaging personally reviewed by me:           Chief Complaint:  Patient is a 74y old  Female who presents with a chief complaint of bleeding from previous PEG site (2024 19:36)      Date of service: 24 @ 09:53    HPI:    The patient is a 74F w/ PMH T2DM, HTN, hypothyroidism, RA on Prednisone, Fibromyalgia, cerebral aneurysm s/p repair, acute hypercapnic respiratory failure s/p trach (vent dependent) and PEG, bedbound, brought in from home by daughter d/t concern for leakage and bleeding from gastrocutaneous fistula site.     During her most recent hospitalization in 2024, she was followed by GI and surgery for a leaking PEG tube. Conservative measures failed so the decision was made to proceed with an EGD and PEG-J placement. EGD 2024 revealed a large gastrocutaneous fistula at the PEG site. The PEG was removed, the site was sutured closed by surgery and a PEG-J was placed at a new site.     Since discharge, the pt has been tolerating feeds through the PEG-J. Diarrhea has been an ongoing issue up to 16 BMs / day. She is bloated, recommended to start Rifaximin but on hold until she completes Dific[facility] treatment for recurrent C-diff. Also treated for PNA but d/c'd d/t allergic rxn. Today her daughter reports concern about bleeding from the gastrocutaneous fistula site, small volume. No melena, green stool on rectal. Hgb stable from baseline.       Allergies:  Tagamet (Unknown)  walnut (Unknown)  pineapple (Unknown)  Lyrica (Unknown)  heparin (Unknown)  penicillin (Unknown)  metronidazole (Rash)  Pecan, Filbert, Hazelnut (Unknown)  meropenem (Rash)      Home Medications:    Hospital Medications:  amLODIPine   Tablet 10 milliGRAM(s) Oral Once  chlorhexidine 0.12% Liquid 15 milliLiter(s) Oral Mucosa every 12 hours  diphenhydrAMINE Elixir 25 milliGRAM(s) Oral Once  escitalopram 10 milliGRAM(s) Oral daily  gabapentin Solution 100 milliGRAM(s) Oral once  predniSONE   Tablet 5 milliGRAM(s) Oral Once  sodium chloride 0.9%. 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  Denies illicit drug use.    ROS:     General:  No wt loss, fevers, chills, night sweats, fatigue,   Eyes:  Good vision, no reported pain  ENT:  No sore throat, pain, runny nose, dysphagia  CV:  No pain, palpitations, hypo/hypertension  Resp:  No dyspnea, cough, tachypnea, wheezing  GI:  See HPI  :  No pain, bleeding, incontinence, nocturia  Muscle:  No pain, weakness  Neuro:  No weakness, tingling, memory problems  Psych:  No fatigue, insomnia, mood problems, depression  Endocrine:  No polyuria, polydipsia, cold/heat intolerance  Heme:  No petechiae, ecchymosis, easy bruisability  Integumentary:  No rash, edema      PHYSICAL EXAM:     GENERAL:  Appears stated age, well-groomed, well-nourished, no distress  HEENT:  NC/AT,  conjunctivae anicteric, clear and pink,   NECK: supple, trachea midline  CHEST:  Full & symmetric excursion, no increased effort, breath sounds clear  HEART:  Regular rhythm, no JVD  ABDOMEN:  Soft, non-tender, non-distended, normoactive bowel sounds,  no masses , no hepatosplenomegaly  EXTREMITIES:  no cyanosis,clubbing or edema  SKIN:  No rash, erythema, or, ecchymoses, no jaundice  NEURO:  Alert, non-focal, no asterixis  PSYCH: Appropriate affect, oriented to place and time  RECTAL: Deferred      Vital Signs:  Vital Signs Last 24 Hrs  T(C): 36.6 (2024 07:10), Max: 37.4 (2024 11:41)  T(F): 97.8 (2024 07:10), Max: 99.3 (2024 11:41)  HR: 94 (2024 08:50) (73 - 99)  BP: 136/58 (2024 07:10) (102/47 - 156/52)  BP(mean): 78 (2024 07:10) (75 - 89)  RR: 16 (2024 07:10) (12 - 34)  SpO2: 100% (2024 08:50) (98% - 100%)    Parameters below as of 2024 07:10  Patient On (Oxygen Delivery Method): tracheostomy collar      Daily Height in cm: 152.4 (2024 11:41)    Daily     LABS: Labs personally reviewed by me:                        8.8    10.37 )-----------( 171      ( 2024 12:19 )             31.1     06-20    137  |  98  |  35<H>  ----------------------------<  170<H>  4.9   |  29  |  <0.30<L>    Ca    9.2      2024 12:19  Phos  4.1       Mg     2.0         TPro  7.6  /  Alb  3.0<L>  /  TBili  0.3  /  DBili  x   /  AST  76<H>  /  ALT  68<H>  /  AlkPhos  82  20    LIVER FUNCTIONS - ( 2024 12:19 )  Alb: 3.0 g/dL / Pro: 7.6 g/dL / ALK PHOS: 82 U/L / ALT: 68 U/L / AST: 76 U/L / GGT: x             Urinalysis Basic - ( 2024 13:23 )    Color: Yellow / Appearance: Clear / S.017 / pH: x  Gluc: x / Ketone: Trace mg/dL  / Bili: Negative / Urobili: 0.2 mg/dL   Blood: x / Protein: Negative mg/dL / Nitrite: Negative   Leuk Esterase: Trace / RBC: 2 /HPF / WBC 4 /HPF   Sq Epi: x / Non Sq Epi: 0 /HPF / Bacteria: Negative /HPF          Imaging personally reviewed by me:

## 2024-06-21 NOTE — CONSULT NOTE ADULT - ASSESSMENT
The patient is a 74F w/ PMH T2DM, HTN, hypothyroidism, RA on Prednisone, Fibromyalgia, cerebral aneurysm s/p repair, acute hypercapnic respiratory failure s/p trach (vent dependent) and PEG, bedbound, brought in from home by daughter d/t concern for leakage and bleeding from gastrocutaneous fistula site.     1. 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 It continues to leak gastric juices, which is to be expected. She was noted to have a small amount of fresh blood oozing from the site, could be from granulation tissue, Hgb stable. Per daughter, wound care at [facility] provided them with a powder which significantly helped w/ the leaking.  - No plans for endoscopic evaluation   - OK to resume PEG-J feeds  - recommend wound care consult   - outpt f/u with Surgery     2. C-diff diarrhea  on Dificid, per ID  - Daughter requesting she be d/c'd w/ a rectal tube. Given chronic large volume diarrhea, complicating healing of her sacral ulcer, this is very reasonable. She understands the risk of developing a rectal ulcer and agrees to short term use.       I had a prolonged conversation with the patient regarding the hospital course, differential diagnosis, results of diagnostic tests this far, and therapeutic modalities available. Plan of care discussed with the patient after the evaluation. Patient expresses a clear understanding of the plan of care. Fifty minutes spent on the total encounter, of which more than fifty percent of the encounter was spent on counseling and/or coordinating care by the attending physician. Advanced care planning forms were discussed. Code status including forceful chest compressions, defibrillation and intubation were discussed. The risks benefits and alternatives to pertinent gastrointestinal procedures and interventions were discussed in detail and all questions were answered. Duration: 15 Minutes.      Andrew Cleaning D.O.  Gastroenterology and Hepatology  [address] [facility], [address]  [place], [place]  Office: [phone]

## 2024-06-21 NOTE — PROGRESS NOTE ADULT - ASSESSMENT
73yo woman  h/o T2DM (4/4/24: A1C = 6.2%), HTN, HLD, anemia, hypothyroidism, RA, fibromyalgia, remote cerebral aneurysm repair, acute hypercapnic respiratory failure with tracheostomy, PEG tube (initially placed 2022), and bedbound, recurrent C diff presents to ED 6/20/24 with bleeding from previous PEG site, continued diarrhea and perirectal excoriation  Additional findings include debility, low serum alb = 3; bronchomalacia, extensive atherosclerosis, hepatic steatosis, fistular tract containing air and fluid penetrating through the abdominal wall to  the skin surface which is likely a recanalized tract at the insertion  site of the prior PEG tube and Perianal transsphincteric fistula tract extending within the left ischioanal fossa to the cutaneous  surface in the left subcutaneous perianal skin.     Suggest   continue DIFICID 200 mg twice daily (initiated as out-pt)  do not redose Cefpodoxime  GI evaluation of bleeding former PEG site - appreciated  - continue local care  Colorectal assessment of perianal transsphincteric fistula tract extending within the left ischioanal fossa to the cutaneous  surface in the left  - appreciated   Fistula has been noted for over 20 years, not bothering patient  no intervention warranted  Rectal tube placement to promote healing of painful perirectal excoriation      I have requested approval for  out-pt administration of live biotherapeutic  ([email or web address]) after completing Dificid course next week    being discharged   if delayed, please call ID if needed over weekend

## 2024-06-21 NOTE — ASSESSMENT
[FreeTextEntry1] : Patient sitting in hospital bed, alert, acknowledges myself with mouthing hello, present with her is her daughter Marysol. Patent with extensive medical history, DM - on insulin, HTN, HLD, RA, Hypothyroidism. In 2022 patient was in [facility] - hypercapnic respiratory failure, since then tracheostomy to ventilator, PEG with feeds, prior to that pt. had Covid daughter reports. Pt. resides at home, has nursing, part of house calls program. Today visit is for concerns over sacral wound, rodrigo wound of peg, open draining area, and rodrigo anus excoriation from confirmed c - difficile, taking Dificid for c diff. placed on by ID, in addition small re- opening, of fistula? daughter reports pt. had this many years ago, had surgery to repair? unsure exactly what was done, as it was many years ago. Receives PEG feeds in addition has supplements of Alfred & Pro stat Has group 2 mattress Peg site - small open area, this is draining moderate to large serous fluid, currently washing area, dressing to absorb which must be changed several times/day, discussed using more absorbant product to help manage secretions with less changing Sacral - clean and pink, edges slight epibole, appears to be healing well, this was an unstageable at one point, using kaltostat, aquacel, was oozing blood that is why using kaltostat, discussed that just a tiny piece should be used and not packing entire wound with it Rodrigo anal area - raw, erythemic from constant loose stools, daughter wants a rectal tube so as to give skin a break from constant stools irritating skin, in addition there is open draining area, ? fistula, which if so the drainage can be erosive to skin

## 2024-06-21 NOTE — HISTORY OF PRESENT ILLNESS
[FreeTextEntry1] : Patient sitting in hospital bed, alert, acknowledges myself with mouthing hello, present with her is her daughter Marysol. Patent with extensive medical history, DM - on insulin, HTN, HLD, RA, Hypothyroidism. In 2022 patient was in [facility] - hypercapnic respiratory failure, since then tracheostomy to ventilator, PEG with feeds, prior to that pt. had Covid daughter reports. Pt. resides at home, has nursing, part of house calls program. Today visit is for concerns over sacral wound, rodrigo wound of peg, open draining area, and rodrigo anus excoriation from confirmed c - difficile, in addition small re- opening, of fistula? daughter reports pt. had this many years ago, had surgery to repair? unsure exactly what was done, as it was many years ago. Receives PEG feeds in addition has supplements of Alfred & Pro stat Has group 2 mattress Peg site - small open area, this is draining moderate to large serous fluid, currently washing area, dressing to absorb which must be changed several times/day, discussed using more absorbant product to help manage secretions with less changing Sacral - clean and pink, edges slight epibole, appears to be healing well, this was an unstageable at one point, using kaltostat, aquacel, was oozing blood that is why using kaltostat, discussed that just a tiny piece should be used and not packing entire wound with it Rodrigo anal area - raw, erythemic from constant loose stools, daughter wants a rectal tube so as to give skin a break from constant stools irritating skin, in addition there is open draining area, ? fistula, which if so the drainage can be erosive to skin

## 2024-06-22 ENCOUNTER — NON-APPOINTMENT (OUTPATIENT)
Age: 74
End: 2024-06-22

## 2024-06-22 RX ORDER — MULTIVIT-MIN/FOLIC/VIT K/LYCOP 400-300MCG
500 TABLET ORAL DAILY
Refills: 0 | Status: ACTIVE | COMMUNITY
Start: 2024-01-26

## 2024-06-22 RX ORDER — FENTANYL 25 UG/H
25 PATCH, EXTENDED RELEASE TRANSDERMAL
Qty: 10 | Refills: 0 | Status: ACTIVE | COMMUNITY
Start: 2024-06-17

## 2024-06-22 RX ORDER — CYANOCOBALAMIN (VITAMIN B-12) 500 MCG
0.8 TABLET ORAL
Refills: 0 | Status: ACTIVE | COMMUNITY

## 2024-06-22 RX ORDER — NUTRITIONAL SUPPLEMENT
POWDER (GRAM) ORAL TWICE DAILY
Qty: 28 | Refills: 0 | Status: ACTIVE | COMMUNITY
Start: 2024-05-06

## 2024-06-22 RX ORDER — IPRATROPIUM BROMIDE AND ALBUTEROL SULFATE 2.5; .5 MG/3ML; MG/3ML
0.5-2.5 (3) SOLUTION RESPIRATORY (INHALATION)
Qty: 6 | Refills: 3 | Status: ACTIVE | COMMUNITY
Start: 2024-05-06

## 2024-06-22 RX ORDER — LIDOCAINE AND PRILOCAINE 25; 25 MG/G; MG/G
2.5-2.5 CREAM TOPICAL
Qty: 1 | Refills: 0 | Status: ACTIVE | COMMUNITY
Start: 2024-06-18

## 2024-06-22 RX ORDER — GABAPENTIN 250 MG/5ML
250 SOLUTION ORAL
Refills: 0 | Status: ACTIVE | COMMUNITY
Start: 2024-05-06

## 2024-06-22 RX ORDER — GLIPIZIDE 5 MG/1
5 TABLET ORAL DAILY
Refills: 0 | Status: ACTIVE | COMMUNITY

## 2024-06-22 RX ORDER — ACETAMINOPHEN 650 MG/1
650 TABLET, FILM COATED, EXTENDED RELEASE ORAL 4 TIMES DAILY
Refills: 0 | Status: ACTIVE | COMMUNITY

## 2024-06-22 RX ORDER — ACETYLCYSTEINE 100 MG/ML
10 SOLUTION ORAL; RESPIRATORY (INHALATION)
Refills: 0 | Status: ACTIVE | COMMUNITY
Start: 2024-02-12

## 2024-06-22 RX ORDER — CALCIUM ACETATE 668 MG
TABLET ORAL
Refills: 0 | Status: ACTIVE | COMMUNITY

## 2024-06-22 RX ORDER — AMLODIPINE BESYLATE 10 MG/1
10 TABLET ORAL DAILY
Refills: 0 | Status: ACTIVE | COMMUNITY
Start: 2024-01-26

## 2024-06-22 RX ORDER — VITAMIN K2 90 MCG
125 MCG CAPSULE ORAL
Refills: 0 | Status: ACTIVE | COMMUNITY

## 2024-06-22 RX ORDER — CALCIUM CARBONATE 500 MG/1
500 TABLET, CHEWABLE ORAL
Refills: 0 | Status: ACTIVE | COMMUNITY
Start: 2024-01-26

## 2024-06-22 RX ORDER — LIDOCAINE 4% 4 G/100G
4 CREAM TOPICAL
Qty: 1 | Refills: 5 | Status: ACTIVE | COMMUNITY
Start: 2024-06-17

## 2024-06-22 RX ORDER — FAMOTIDINE 40 MG/1
40 TABLET, FILM COATED ORAL
Qty: 1 | Refills: 0 | Status: ACTIVE | COMMUNITY
Start: 2024-06-14

## 2024-06-22 RX ORDER — METFORMIN HYDROCHLORIDE 500 MG/1
500 TABLET, COATED ORAL TWICE DAILY
Refills: 0 | Status: ACTIVE | COMMUNITY

## 2024-06-22 RX ORDER — METOPROLOL SUCCINATE 25 MG/1
25 TABLET, EXTENDED RELEASE ORAL DAILY
Refills: 0 | Status: ACTIVE | COMMUNITY

## 2024-06-24 ENCOUNTER — NON-APPOINTMENT (OUTPATIENT)
Age: 74
End: 2024-06-24

## 2024-06-25 ENCOUNTER — NON-APPOINTMENT (OUTPATIENT)
Age: 74
End: 2024-06-25

## 2024-06-28 ENCOUNTER — APPOINTMENT (OUTPATIENT)
Dept: WOUND CARE | Facility: HOSPITAL | Age: 74
End: 2024-06-28
Payer: MEDICARE

## 2024-06-28 ENCOUNTER — OUTPATIENT (OUTPATIENT)
Dept: OUTPATIENT SERVICES | Facility: HOSPITAL | Age: 74
LOS: 1 days | End: 2024-06-28
Payer: MEDICARE

## 2024-06-28 ENCOUNTER — NON-APPOINTMENT (OUTPATIENT)
Age: 74
End: 2024-06-28

## 2024-06-28 DIAGNOSIS — L89.152 PRESSURE ULCER OF SACRAL REGION, STAGE 2: ICD-10-CM

## 2024-06-28 DIAGNOSIS — L30.9 DERMATITIS, UNSPECIFIED: ICD-10-CM

## 2024-06-28 DIAGNOSIS — K63.2 FISTULA OF INTESTINE: ICD-10-CM

## 2024-06-28 PROCEDURE — 99214 OFFICE O/P EST MOD 30 MIN: CPT | Mod: 95

## 2024-06-28 PROCEDURE — G0463: CPT

## 2024-07-01 ENCOUNTER — NON-APPOINTMENT (OUTPATIENT)
Age: 74
End: 2024-07-01

## 2024-07-01 ENCOUNTER — TRANSCRIPTION ENCOUNTER (OUTPATIENT)
Age: 74
End: 2024-07-01

## 2024-07-01 DIAGNOSIS — D64.9 ANEMIA, UNSPECIFIED: ICD-10-CM

## 2024-07-01 RX ORDER — INSULIN LISPRO 100 [IU]/ML
100 INJECTION, SOLUTION INTRAVENOUS; SUBCUTANEOUS
Qty: 3 | Refills: 2 | Status: ACTIVE | COMMUNITY
Start: 2024-07-01 | End: 1900-01-01

## 2024-07-02 ENCOUNTER — LABORATORY RESULT (OUTPATIENT)
Age: 74
End: 2024-07-02

## 2024-07-02 RX ORDER — LANCETS
EACH MISCELLANEOUS
Qty: 1 | Refills: 1 | Status: ACTIVE | COMMUNITY
Start: 2024-07-01 | End: 1900-01-01

## 2024-07-02 RX ORDER — BLOOD-GLUCOSE METER
W/DEVICE KIT MISCELLANEOUS
Qty: 1 | Refills: 1 | Status: ACTIVE | COMMUNITY
Start: 2024-07-02 | End: 1900-01-01

## 2024-07-03 ENCOUNTER — NON-APPOINTMENT (OUTPATIENT)
Age: 74
End: 2024-07-03

## 2024-07-03 RX ORDER — ACETAMINOPHEN 500 MG/1
500 TABLET ORAL 3 TIMES DAILY
Qty: 9 | Refills: 3 | Status: ACTIVE | COMMUNITY
Start: 2024-07-03 | End: 1900-01-01

## 2024-07-10 ENCOUNTER — APPOINTMENT (OUTPATIENT)
Dept: AFTER HOURS CARE | Facility: EMERGENCY ROOM | Age: 74
End: 2024-07-10

## 2024-07-10 ENCOUNTER — TRANSCRIPTION ENCOUNTER (OUTPATIENT)
Age: 74
End: 2024-07-10

## 2024-07-10 ENCOUNTER — NON-APPOINTMENT (OUTPATIENT)
Age: 74
End: 2024-07-10

## 2024-07-10 ENCOUNTER — LABORATORY RESULT (OUTPATIENT)
Age: 74
End: 2024-07-10

## 2024-07-10 RX ORDER — PREDNISONE 20 MG/1
20 TABLET ORAL
Qty: 10 | Refills: 0 | Status: ACTIVE | COMMUNITY
Start: 2024-07-10 | End: 1900-01-01

## 2024-07-11 ENCOUNTER — APPOINTMENT (OUTPATIENT)
Dept: ENDOCRINOLOGY | Facility: CLINIC | Age: 74
End: 2024-07-11
Payer: MEDICARE

## 2024-07-11 ENCOUNTER — NON-APPOINTMENT (OUTPATIENT)
Age: 74
End: 2024-07-11

## 2024-07-11 DIAGNOSIS — E03.9 HYPOTHYROIDISM, UNSPECIFIED: ICD-10-CM

## 2024-07-11 PROCEDURE — 99215 OFFICE O/P EST HI 40 MIN: CPT

## 2024-07-11 PROCEDURE — G2211 COMPLEX E/M VISIT ADD ON: CPT

## 2024-07-12 ENCOUNTER — TRANSCRIPTION ENCOUNTER (OUTPATIENT)
Age: 74
End: 2024-07-12

## 2024-07-12 ENCOUNTER — APPOINTMENT (OUTPATIENT)
Dept: HOME HEALTH SERVICES | Facility: HOME HEALTH | Age: 74
End: 2024-07-12
Payer: MEDICARE

## 2024-07-12 VITALS
DIASTOLIC BLOOD PRESSURE: 50 MMHG | HEART RATE: 88 BPM | SYSTOLIC BLOOD PRESSURE: 110 MMHG | OXYGEN SATURATION: 100 % | RESPIRATION RATE: 18 BRPM

## 2024-07-12 DIAGNOSIS — G89.4 CHRONIC PAIN SYNDROME: ICD-10-CM

## 2024-07-12 DIAGNOSIS — M06.9 RHEUMATOID ARTHRITIS, UNSPECIFIED: ICD-10-CM

## 2024-07-12 DIAGNOSIS — I10 ESSENTIAL (PRIMARY) HYPERTENSION: ICD-10-CM

## 2024-07-12 DIAGNOSIS — E87.1 HYPO-OSMOLALITY AND HYPONATREMIA: ICD-10-CM

## 2024-07-12 DIAGNOSIS — J96.12 CHRONIC RESPIRATORY FAILURE WITH HYPERCAPNIA: ICD-10-CM

## 2024-07-12 DIAGNOSIS — M79.7 FIBROMYALGIA: ICD-10-CM

## 2024-07-12 DIAGNOSIS — L89.152 PRESSURE ULCER OF SACRAL REGION, STAGE 2: ICD-10-CM

## 2024-07-12 LAB
ALBUMIN SERPL ELPH-MCNC: 2.9 G/DL
ALP BLD-CCNC: 109 U/L
ALT SERPL-CCNC: 43 U/L
ANION GAP SERPL CALC-SCNC: 10 MMOL/L
AST SERPL-CCNC: 36 U/L
BASOPHILS # BLD AUTO: 0 K/UL
BASOPHILS NFR BLD AUTO: 0 %
BILIRUB SERPL-MCNC: 0.4 MG/DL
BUN SERPL-MCNC: 37 MG/DL
CALCIUM SERPL-MCNC: 8.3 MG/DL
CHLORIDE SERPL-SCNC: 85 MMOL/L
CO2 SERPL-SCNC: 28 MMOL/L
CREAT SERPL-MCNC: 0.21 MG/DL
EGFR: 123 ML/MIN/1.73M2
EOSINOPHIL # BLD AUTO: 0 K/UL
EOSINOPHIL NFR BLD AUTO: 0 %
GLUCOSE SERPL-MCNC: 308 MG/DL
HCT VFR BLD CALC: 28.2 %
HGB BLD-MCNC: 8.2 G/DL
LYMPHOCYTES # BLD AUTO: 1.55 K/UL
LYMPHOCYTES NFR BLD AUTO: 15.7 %
MAN DIFF?: NORMAL
MCHC RBC-ENTMCNC: 27.7 PG
MCHC RBC-ENTMCNC: 29.1 GM/DL
MCV RBC AUTO: 95.3 FL
MONOCYTES # BLD AUTO: 0.26 K/UL
MONOCYTES NFR BLD AUTO: 2.6 %
NEUTROPHILS # BLD AUTO: 7.82 K/UL
NEUTROPHILS NFR BLD AUTO: 79.1 %
PLATELET # BLD AUTO: 173 K/UL
POTASSIUM SERPL-SCNC: 5.1 MMOL/L
PROT SERPL-MCNC: 6.8 G/DL
RBC # BLD: 2.96 M/UL
RBC # FLD: 19.1 %
SODIUM SERPL-SCNC: 122 MMOL/L
WBC # FLD AUTO: 9.89 K/UL

## 2024-07-12 PROCEDURE — 99350 HOME/RES VST EST HIGH MDM 60: CPT

## 2024-07-12 RX ORDER — FENTANYL 12 UG/H
12 PATCH, EXTENDED RELEASE TRANSDERMAL
Qty: 30 | Refills: 0 | Status: ACTIVE | COMMUNITY
Start: 2024-07-12 | End: 1900-01-01

## 2024-07-12 RX ORDER — NORMAL SALT TABLETS 1 G/G
1 TABLET ORAL
Qty: 30 | Refills: 0 | Status: ACTIVE | COMMUNITY
Start: 2024-07-12 | End: 1900-01-01

## 2024-07-12 RX ORDER — FENTANYL 25 UG/H
25 PATCH, EXTENDED RELEASE TRANSDERMAL
Qty: 30 | Refills: 0 | Status: ACTIVE | COMMUNITY
Start: 2024-07-12 | End: 1900-01-01

## 2024-07-15 ENCOUNTER — NON-APPOINTMENT (OUTPATIENT)
Age: 74
End: 2024-07-15

## 2024-07-16 ENCOUNTER — NON-APPOINTMENT (OUTPATIENT)
Age: 74
End: 2024-07-16

## 2024-07-16 LAB
ANION GAP SERPL CALC-SCNC: 11 MMOL/L
BUN SERPL-MCNC: 23 MG/DL
CHLORIDE SERPL-SCNC: 101 MMOL/L
CO2 SERPL-SCNC: 27 MMOL/L
CREAT SERPL-MCNC: 0.16 MG/DL
EGFR: 131 ML/MIN/1.73M2
GLUCOSE SERPL-MCNC: 275 MG/DL
POTASSIUM SERPL-SCNC: 5 MMOL/L
SODIUM SERPL-SCNC: 139 MMOL/L

## 2024-07-17 LAB
HCT VFR BLD CALC: 33.1 %
HGB BLD-MCNC: 8.9 G/DL
MCHC RBC-ENTMCNC: 26.9 GM/DL
MCHC RBC-ENTMCNC: 28.2 PG
MCV RBC AUTO: 104.7 FL
PLATELET # BLD AUTO: 153 K/UL
RBC # BLD: 3.16 M/UL
RBC # FLD: 20.5 %
WBC # FLD AUTO: 7.62 K/UL

## 2024-07-19 ENCOUNTER — APPOINTMENT (OUTPATIENT)
Dept: WOUND CARE | Facility: HOSPITAL | Age: 74
End: 2024-07-19

## 2024-07-19 ENCOUNTER — OUTPATIENT (OUTPATIENT)
Dept: OUTPATIENT SERVICES | Facility: HOSPITAL | Age: 74
LOS: 1 days | End: 2024-07-19
Payer: MEDICARE

## 2024-07-19 ENCOUNTER — NON-APPOINTMENT (OUTPATIENT)
Age: 74
End: 2024-07-19

## 2024-07-19 DIAGNOSIS — Z98.890 OTHER SPECIFIED POSTPROCEDURAL STATES: Chronic | ICD-10-CM

## 2024-07-19 DIAGNOSIS — Z93.1 GASTROSTOMY STATUS: Chronic | ICD-10-CM

## 2024-07-19 PROCEDURE — G0463: CPT

## 2024-07-19 PROCEDURE — 99214 OFFICE O/P EST MOD 30 MIN: CPT | Mod: 95

## 2024-07-20 ENCOUNTER — NON-APPOINTMENT (OUTPATIENT)
Age: 74
End: 2024-07-20

## 2024-07-20 ENCOUNTER — TRANSCRIPTION ENCOUNTER (OUTPATIENT)
Age: 74
End: 2024-07-20

## 2024-07-20 DIAGNOSIS — E11.9 TYPE 2 DIABETES MELLITUS W/OUT COMPLICATIONS: ICD-10-CM

## 2024-07-23 ENCOUNTER — TRANSCRIPTION ENCOUNTER (OUTPATIENT)
Age: 74
End: 2024-07-23

## 2024-07-23 DIAGNOSIS — Z20.822 CONTACT WITH AND (SUSPECTED) EXPOSURE TO COVID-19: ICD-10-CM

## 2024-07-24 ENCOUNTER — NON-APPOINTMENT (OUTPATIENT)
Age: 74
End: 2024-07-24

## 2024-07-24 LAB
ANION GAP SERPL CALC-SCNC: 13 MMOL/L
BASOPHILS # BLD AUTO: 0.03 K/UL
BASOPHILS NFR BLD AUTO: 0.3 %
BUN SERPL-MCNC: 29 MG/DL
CALCIUM SERPL-MCNC: 8.6 MG/DL
CHLORIDE SERPL-SCNC: 98 MMOL/L
CO2 SERPL-SCNC: 24 MMOL/L
CREAT SERPL-MCNC: 0.18 MG/DL
EGFR: 127 ML/MIN/1.73M2
EOSINOPHIL # BLD AUTO: 0.02 K/UL
EOSINOPHIL NFR BLD AUTO: 0.2 %
GLUCOSE SERPL-MCNC: 244 MG/DL
HCT VFR BLD CALC: 31 %
HGB BLD-MCNC: 8.7 G/DL
IMM GRANULOCYTES NFR BLD AUTO: 4.9 %
INFLUENZA A RESULT: NOT DETECTED
INFLUENZA B RESULT: NOT DETECTED
LYMPHOCYTES # BLD AUTO: 1.26 K/UL
LYMPHOCYTES NFR BLD AUTO: 14 %
MAN DIFF?: NORMAL
MCHC RBC-ENTMCNC: 27.7 PG
MCHC RBC-ENTMCNC: 28.1 GM/DL
MCV RBC AUTO: 98.7 FL
MONOCYTES # BLD AUTO: 0.44 K/UL
MONOCYTES NFR BLD AUTO: 4.9 %
NEUTROPHILS # BLD AUTO: 6.83 K/UL
NEUTROPHILS NFR BLD AUTO: 75.7 %
PLATELET # BLD AUTO: 144 K/UL
POTASSIUM SERPL-SCNC: 4.2 MMOL/L
RBC # BLD: 3.14 M/UL
RBC # FLD: 18.4 %
RESP SYN VIRUS RESULT: NOT DETECTED
SARS-COV-2 RESULT: NOT DETECTED
SODIUM SERPL-SCNC: 136 MMOL/L
WBC # FLD AUTO: 9.02 K/UL

## 2024-07-29 DIAGNOSIS — L89.152 PRESSURE ULCER OF SACRAL REGION, STAGE 2: ICD-10-CM

## 2024-07-29 DIAGNOSIS — L30.8 OTHER SPECIFIED DERMATITIS: ICD-10-CM

## 2024-07-29 DIAGNOSIS — L29.0 PRURITUS ANI: ICD-10-CM

## 2024-07-29 DIAGNOSIS — K63.2 FISTULA OF INTESTINE: ICD-10-CM

## 2024-08-01 ENCOUNTER — NON-APPOINTMENT (OUTPATIENT)
Age: 74
End: 2024-08-01

## 2024-08-01 PROBLEM — S31.109D OPEN WOUND OF ANTERIOR ABDOMINAL WALL, SUBSEQUENT ENCOUNTER: Status: ACTIVE | Noted: 2024-08-01

## 2024-08-07 ENCOUNTER — LABORATORY RESULT (OUTPATIENT)
Age: 74
End: 2024-08-07

## 2024-08-09 ENCOUNTER — NON-APPOINTMENT (OUTPATIENT)
Age: 74
End: 2024-08-09

## 2024-08-12 ENCOUNTER — NON-APPOINTMENT (OUTPATIENT)
Age: 74
End: 2024-08-12

## 2024-08-12 ENCOUNTER — TRANSCRIPTION ENCOUNTER (OUTPATIENT)
Age: 74
End: 2024-08-12

## 2024-08-13 ENCOUNTER — TRANSCRIPTION ENCOUNTER (OUTPATIENT)
Age: 74
End: 2024-08-13

## 2024-08-14 ENCOUNTER — APPOINTMENT (OUTPATIENT)
Dept: PULMONOLOGY | Facility: CLINIC | Age: 74
End: 2024-08-14
Payer: MEDICARE

## 2024-08-14 DIAGNOSIS — Z99.11 DEPENDENCE ON RESPIRATOR [VENTILATOR] STATUS: ICD-10-CM

## 2024-08-14 PROCEDURE — 99495 TRANSJ CARE MGMT MOD F2F 14D: CPT

## 2024-08-14 NOTE — HISTORY OF PRESENT ILLNESS
[Home] : at home, [unfilled] , at the time of the visit. [Medical Office: ([facility])___] : at the medical office located in  [Family Member] : family member [Verbal consent obtained from patient] : the patient, [unfilled] [[place]] : Post-hospitalization from [facility] [Other: _____] : [unfilled] [Yes] : Yes [Admitted on: ___] : The patient was admitted on [unfilled] [Discharged on ___] : discharged on [unfilled] [Discharge Summary] : discharge summary [Pertinent Labs] : pertinent labs [Radiology Findings] : radiology findings [Discharge Med List] : discharge medication list [Med Reconciliation] : medication reconciliation has been completed [Patient Contacted By: ____] : and contacted by [unfilled] [FreeTextEntry2] : Posthospital discharge televisit.  Patient's daughter present.  75-year-old woman with chronic respiratory failure, chronic vent dependence, lives at home.  Multiple admissions to [facility] respiratory care unit. This time patient was electively admitted on August 11 for the leaking around a previous PEG site.  She underwent repair of a gastric fistula.  And also underwent elective trach change by ENT, she has an 8 distal XLT Shiley Patient receiving feeding without issues.  Her discharge was post to be yesterday was delayed secondary to hyperglycemia which improved.  Patient came home today.  She is followed by [facility].  On video, elderly woman, lying in bed, trach to vent, alert and in no distress

## 2024-08-14 NOTE — ASSESSMENT
[FreeTextEntry1] : Chronic vent dependence.  GJ tube.  Postrepair of fistula the former PEG site.  And trach change, she has a distal XLT Shiley.  Currently no active issues.  She was discharged earlier today.  Daughter has everything that she needs at the time.  Was followed by [facility].

## 2024-08-14 NOTE — PHYSICAL EXAM
[47802 - Moderate Complexity requires multiple possible diagnoses and/or the management options, moderate complexity of the medical data (tests, etc.) to be reviewed, and moderate risk of significant complications, morbidity, and/or mortality as well as co] : Moderate Complexity

## 2024-08-15 ENCOUNTER — APPOINTMENT (OUTPATIENT)
Dept: HOME HEALTH SERVICES | Facility: HOME HEALTH | Age: 74
End: 2024-08-15

## 2024-08-15 ENCOUNTER — NON-APPOINTMENT (OUTPATIENT)
Age: 74
End: 2024-08-15

## 2024-08-15 VITALS
DIASTOLIC BLOOD PRESSURE: 50 MMHG | TEMPERATURE: 98.8 F | OXYGEN SATURATION: 100 % | SYSTOLIC BLOOD PRESSURE: 124 MMHG | RESPIRATION RATE: 18 BRPM | HEART RATE: 85 BPM

## 2024-08-15 DIAGNOSIS — S31.109D UNSPECIFIED OPEN WOUND OF ABDOMINAL WALL, UNSPECIFIED QUADRANT WITHOUT PENETRATION INTO PERITONEAL CAVITY, SUBSEQUENT ENCOUNTER: ICD-10-CM

## 2024-08-15 DIAGNOSIS — L30.9 DERMATITIS, UNSPECIFIED: ICD-10-CM

## 2024-08-15 DIAGNOSIS — L89.152 PRESSURE ULCER OF SACRAL REGION, STAGE 2: ICD-10-CM

## 2024-08-15 RX ORDER — ESCITALOPRAM OXALATE 10 MG/1
10 TABLET ORAL
Qty: 90 | Refills: 3 | Status: ACTIVE | COMMUNITY
Start: 2024-08-15

## 2024-08-15 RX ORDER — DEXAMETHASONE SODIUM PHOSPHATE 1 MG/ML
0.1 SOLUTION/ DROPS OPHTHALMIC 3 TIMES DAILY
Refills: 0 | Status: ACTIVE | COMMUNITY
Start: 2024-08-15

## 2024-08-15 RX ORDER — ETANERCEPT 50 MG/ML
50 SOLUTION SUBCUTANEOUS WEEKLY
Refills: 0 | Status: ACTIVE | COMMUNITY
Start: 2024-08-15

## 2024-08-16 ENCOUNTER — NON-APPOINTMENT (OUTPATIENT)
Age: 74
End: 2024-08-16

## 2024-08-21 ENCOUNTER — APPOINTMENT (OUTPATIENT)
Dept: HOME HEALTH SERVICES | Facility: HOME HEALTH | Age: 74
End: 2024-08-21
Payer: MEDICARE

## 2024-08-21 DIAGNOSIS — M06.9 RHEUMATOID ARTHRITIS, UNSPECIFIED: ICD-10-CM

## 2024-08-21 DIAGNOSIS — J96.12 CHRONIC RESPIRATORY FAILURE WITH HYPERCAPNIA: ICD-10-CM

## 2024-08-21 DIAGNOSIS — S31.109D UNSPECIFIED OPEN WOUND OF ABDOMINAL WALL, UNSPECIFIED QUADRANT W/OUT PENETRATION INTO PERITONEAL CAVITY, SUBSEQUENT ENCOUNTER: ICD-10-CM

## 2024-08-21 DIAGNOSIS — L89.152 PRESSURE ULCER OF SACRAL REGION, STAGE 2: ICD-10-CM

## 2024-08-21 DIAGNOSIS — E55.9 VITAMIN D DEFICIENCY, UNSPECIFIED: ICD-10-CM

## 2024-08-21 DIAGNOSIS — E03.9 HYPOTHYROIDISM, UNSPECIFIED: ICD-10-CM

## 2024-08-21 DIAGNOSIS — E87.1 HYPO-OSMOLALITY AND HYPONATREMIA: ICD-10-CM

## 2024-08-21 DIAGNOSIS — G89.4 CHRONIC PAIN SYNDROME: ICD-10-CM

## 2024-08-21 DIAGNOSIS — E11.9 TYPE 2 DIABETES MELLITUS W/OUT COMPLICATIONS: ICD-10-CM

## 2024-08-21 PROCEDURE — 99349 HOME/RES VST EST MOD MDM 40: CPT

## 2024-08-21 PROCEDURE — 99496 TRANSJ CARE MGMT HIGH F2F 7D: CPT

## 2024-08-22 ENCOUNTER — LABORATORY RESULT (OUTPATIENT)
Age: 74
End: 2024-08-22

## 2024-08-22 ENCOUNTER — NON-APPOINTMENT (OUTPATIENT)
Age: 74
End: 2024-08-22

## 2024-08-22 VITALS
TEMPERATURE: 97.3 F | DIASTOLIC BLOOD PRESSURE: 58 MMHG | OXYGEN SATURATION: 100 % | RESPIRATION RATE: 18 BRPM | SYSTOLIC BLOOD PRESSURE: 110 MMHG | HEART RATE: 82 BPM

## 2024-08-22 NOTE — REASON FOR VISIT
[Post-hospitalization from ___ Hospital] : Post-hospitalization from [unfilled] Hospital [Admitted on: ___] : The patient was admitted on [unfilled] [Discharged on ___] : discharged on [unfilled] [Pre-Visit Preparation] : Pre-visit preparation was done [FreeTextEntry2] : She was admitted on 8/11/24 for leaking around the Peg tube. and discharged home on 8/14/24. During hospitalization pt had a gastric fistula repair and a trach change-8 distal XLT Veronicaley.  Angina pectoris

## 2024-08-22 NOTE — HISTORY OF PRESENT ILLNESS
[FreeTextEntry2] : MAXX LOPEZ is a 74 year F with PMH significant for Chronic hypercapnic respiratory failure s/ trach and chronic peg g-j tube, DM, HTN, Fibromyalgia, RA, generalized pain, PUD, recurrent C.Diff  Interval History: Pt underwent prior PEG site repair. Unfortunately, pt is still having some drainage from that site. Daughter plans to monitor for about a month and if no healing, will contact a surgeon as was recommended by her GI specialist. Pt has been more awake and alert. Has been spending some time in a chair.  Pt has been using less oxycodone to control pain.  Daughter wants to try to give gabapentin 5 ml at night as opposed to 2.5 mg bid because patient is more somnolent after the morning dose.  regarding DM, her glucose has been ranging from 190 tp 360 which is better than 400-500's. daughter states that HbA1C was 7.5 in the hospital?  Discussed increasing Lantus to 35 units HS and Homolog premeal to 20 units plus ISS Daughter has been giving pt 2579-2244 cc of water but feels like pt is not getting enough and wants to increase to 2000cc. We discussed the risk of Hyponatremia. Decided to check BMP and if Na is >135, can increase the fluids and repeat BMP in 2 weeks Sacral wound is improving, using Aquacel    Follows with: Dr Newman - [facility] Pulmonology Endocrinology GI - Dr Morgan Rheumatology [facility] care - wound care Home dental  Active medical problems: #IDDM2 - following with endo, dexcom monitoring, Lantus and Humolog #HTN/ HLD - amlodipine 10mg daily, Torpol XL 25mg daily #Hypothyroidism - Synthroid 50mcg daily #RA on prednisone c/b adrenal insufficiency / Sjogren syndrome - prednisone 5mg/ml 5ml daily #Fibromyalgia and diabetic neuropathy - gabapentin  5ml at bedtime. Lidocaine patches #Osteoporosis with multiple fractures - on prolia injections #cerebral aneurysm 2005 not repaired - monitored #Chronic hypercapnic respiratory failure s/p trach (vent dependent) and chronic PEG G-J tube, suspecting originating factor was covid c/b mycoplasma PNA. C/b MRSA PNA and MRSA bacteremia. - duonebs q6hrs prn #Recurrent C diff infections (follows with Dr Newman) #hemorrhoids #PUD - pantoprazole, simethicone 125mg 4x daily #Hallucinations/delirium - quetiapine 25mg half tablet at bedtime. #HCM: Ascorbic acid, banatrol plus, calcium acetate, tubs, magnesium, zinc, folic acid, ayush 1 packet BID  Recent hospitalizations: April 2024 - abdominal wall cellulitis at PEG tube site, PEG tube site closed with new stoma created. Required extensive abx course. C/b Cdiff infection

## 2024-08-22 NOTE — REASON FOR VISIT
[Post-hospitalization from ___ Hospital] : Post-hospitalization from [unfilled] Hospital [Admitted on: ___] : The patient was admitted on [unfilled] [Discharged on ___] : discharged on [unfilled] [Pre-Visit Preparation] : Pre-visit preparation was done [FreeTextEntry2] : She was admitted on 8/11/24 for leaking around the Peg tube. and discharged home on 8/14/24. During hospitalization pt had a gastric fistula repair and a trach change-8 distal XLT Veronicaley.

## 2024-08-22 NOTE — PHYSICAL EXAM
[No Acute Distress] : no acute distress [EOMI] : extra ocular movement intact [No JVD] : no jugular venous distention [Supple] : the neck was supple [No Respiratory Distress] : no respiratory distress [Clear to Auscultation] : lungs were clear to auscultation bilaterally [No Accessory Muscle Use] : no accessory muscle use [Normal Rate] : heart rate was normal  [Regular Rhythm] : with a regular rhythm [Normal S1, S2] : normal S1 and S2 [No Edema] : there was no peripheral edema [Normal Bowel Sounds] : normal bowel sounds [Non Tender] : non-tender [Soft] : abdomen soft [de-identified] : on a ventilator with tracheostomy [de-identified] : mildly distended, G/J tube in place. Prior G tube opening 6mm x 3mm x 0.5mm. Minimal amount of drainage noted [de-identified] : bedbound [de-identified] : sacral wound stage 3 6 cm x 3 cm x 0.5 cm. No surrounding erythema or discharge [de-identified] : awake, responds to verbal stimuli, able to say a few words

## 2024-08-22 NOTE — PHYSICAL EXAM
[No Acute Distress] : no acute distress [EOMI] : extra ocular movement intact [No JVD] : no jugular venous distention [Supple] : the neck was supple [No Respiratory Distress] : no respiratory distress [Clear to Auscultation] : lungs were clear to auscultation bilaterally [No Accessory Muscle Use] : no accessory muscle use [Normal Rate] : heart rate was normal  [Regular Rhythm] : with a regular rhythm [Normal S1, S2] : normal S1 and S2 [No Edema] : there was no peripheral edema [Normal Bowel Sounds] : normal bowel sounds [Non Tender] : non-tender [Soft] : abdomen soft [de-identified] : on a ventilator with tracheostomy [de-identified] : mildly distended, G/J tube in place. Prior G tube opening 6mm x 3mm x 0.5mm. Minimal amount of drainage noted [de-identified] : bedbound [de-identified] : sacral wound stage 3 6 cm x 3 cm x 0.5 cm. No surrounding erythema or discharge [de-identified] : awake, responds to verbal stimuli, able to say a few words

## 2024-08-22 NOTE — HISTORY OF PRESENT ILLNESS
[FreeTextEntry2] : MAXX LOPEZ is a 74 year F with PMH significant for Chronic hypercapnic respiratory failure s/ trach and chronic peg g-j tube, DM, HTN, Fibromyalgia, RA, generalized pain, PUD, recurrent C.Diff  Interval History: Pt underwent prior PEG site repair. Unfortunately, pt is still having some drainage from that site. Daughter plans to monitor for about a month and if no healing, will contact a surgeon as was recommended by her GI specialist. Pt has been more awake and alert. Has been spending some time in a chair.  Pt has been using less oxycodone to control pain.  Daughter wants to try to give gabapentin 5 ml at night as opposed to 2.5 mg bid because patient is more somnolent after the morning dose.  regarding DM, her glucose has been ranging from 190 tp 360 which is better than 400-500's. daughter states that HbA1C was 7.5 in the hospital?  Discussed increasing Lantus to 35 units HS and Homolog premeal to 20 units plus ISS Daughter has been giving pt 8666-3655 cc of water but feels like pt is not getting enough and wants to increase to 2000cc. We discussed the risk of Hyponatremia. Decided to check BMP and if Na is >135, can increase the fluids and repeat BMP in 2 weeks Sacral wound is improving, using Aquacel    Follows with: Dr Newman - [facility] Pulmonology Endocrinology GI - Dr Morgan Rheumatology [facility] care - wound care Home dental  Active medical problems: #IDDM2 - following with endo, dexcom monitoring, Lantus and Humolog #HTN/ HLD - amlodipine 10mg daily, Torpol XL 25mg daily #Hypothyroidism - Synthroid 50mcg daily #RA on prednisone c/b adrenal insufficiency / Sjogren syndrome - prednisone 5mg/ml 5ml daily #Fibromyalgia and diabetic neuropathy - gabapentin  5ml at bedtime. Lidocaine patches #Osteoporosis with multiple fractures - on prolia injections #cerebral aneurysm 2005 not repaired - monitored #Chronic hypercapnic respiratory failure s/p trach (vent dependent) and chronic PEG G-J tube, suspecting originating factor was covid c/b mycoplasma PNA. C/b MRSA PNA and MRSA bacteremia. - duonebs q6hrs prn #Recurrent C diff infections (follows with Dr Newman) #hemorrhoids #PUD - pantoprazole, simethicone 125mg 4x daily #Hallucinations/delirium - quetiapine 25mg half tablet at bedtime. #HCM: Ascorbic acid, banatrol plus, calcium acetate, tubs, magnesium, zinc, folic acid, ayush 1 packet BID  Recent hospitalizations: April 2024 - abdominal wall cellulitis at PEG tube site, PEG tube site closed with new stoma created. Required extensive abx course. C/b Cdiff infection

## 2024-08-23 RX ORDER — GINGER ROOT/GINGER ROOT EXT 262.5 MG
125 CAPSULE ORAL
Qty: 4 | Refills: 5 | Status: ACTIVE | COMMUNITY
Start: 2024-08-15 | End: 1900-01-01

## 2024-08-26 ENCOUNTER — NON-APPOINTMENT (OUTPATIENT)
Age: 74
End: 2024-08-26

## 2024-08-28 ENCOUNTER — NON-APPOINTMENT (OUTPATIENT)
Age: 74
End: 2024-08-28

## 2024-08-29 RX ORDER — FERROUS SULFATE 15 MG/ML
75 (15 FE) DROPS ORAL
Qty: 1 | Refills: 3 | Status: ACTIVE | COMMUNITY
Start: 2024-08-29 | End: 1900-01-01

## 2024-08-30 ENCOUNTER — LABORATORY RESULT (OUTPATIENT)
Age: 74
End: 2024-08-30

## 2024-08-31 ENCOUNTER — TRANSCRIPTION ENCOUNTER (OUTPATIENT)
Age: 74
End: 2024-08-31

## 2024-09-03 ENCOUNTER — APPOINTMENT (OUTPATIENT)
Dept: ENDOCRINOLOGY | Facility: CLINIC | Age: 74
End: 2024-09-03

## 2024-09-03 DIAGNOSIS — E11.9 TYPE 2 DIABETES MELLITUS W/OUT COMPLICATIONS: ICD-10-CM

## 2024-09-03 DIAGNOSIS — E03.9 HYPOTHYROIDISM, UNSPECIFIED: ICD-10-CM

## 2024-09-03 PROCEDURE — 99215 OFFICE O/P EST HI 40 MIN: CPT

## 2024-09-03 PROCEDURE — G2211 COMPLEX E/M VISIT ADD ON: CPT

## 2024-09-03 NOTE — HISTORY OF PRESENT ILLNESS
[Home] : at home, [unfilled] , at the time of the visit. [Medical Office: ([facility])___] : at the medical office located in  [Family Member] : family member [Verbal consent obtained from patient] : the patient, [unfilled] [FreeTextEntry1] : #Type 2 DM 75 y/o F with complex medical history with a PMHx of T2DM, HTN, HLD, anemia, hypothyroidism, RA, fibromyalgia, remote cerebral aneurysm repair, acute hypercapnic respiratory failure now with tracheostomy, PEG tube, and bedbound (trach change 8/18, PEG change 8/14), sacral pressure ulcer.  Prior history: Spoke with patient's daughter Marysol who reports DM2 for 20 years, thought to be due to longterm high doses of prednisone for treatment of rheumatoid arthritis Patient's tube feeds are now at 80ml/hr - running for about 12-13 hours   Current regimen:  Lantus 18 units at 9pm Humalog 10 units with each can + SSI  at the start of each can for feeds  SMBG: 3x/day before 1st can- 170, 124, 131, 170, 170  before 2nd can- 324, 338, 267, 262, 284, 253 before 3rd can- 267, 243, 159  Now patient is started on prednisone 40mg daily  Sugars have been running even higher  #Hypothyroidism   Of note, patient is on Lt4 125mcg daily

## 2024-09-03 NOTE — ASSESSMENT
[FreeTextEntry1] : #Type 2 DM # Steroid-induced hyperglycemia -There has been a lot of difficulty managing the patient's blood sugars because there has been significant variation in patient's tube feedings.  Patient has diarrhea and tube feedings get discontinued.  Also is currently struggling with a bleeding fistula which is likely contributing to overall stress and increased blood sugars.  Additionally, she is now started on prednisone 40 mg daily for her RA which is contributing to more hyperglycemia Plan: -Advised to increase Lantus to 28 units nightly -Increase Humalog to 16 units plus moderate sliding scale -If blood sugar more than 450 x 3, patient should go to the hospital -We spoke about that if tube feeds are held for any reason, hold regular insulin.  If blood glucose is under 100, hold regular insulin.  If blood glucose is under 70 persistently, let us know to further adjust regimen -Patient needs to establish care with GI to help manage tube feeds - start using Dexcom G7   # Secondary adrenal insufficiency. - Due to chronic steroid use  Patient is now on prednisone 40 mg daily   # Hypothyroidism. - continue levothyroxine 125mcg daily - Check TFTs

## 2024-09-05 DIAGNOSIS — Z79.899 OTHER LONG TERM (CURRENT) DRUG THERAPY: ICD-10-CM

## 2024-09-05 RX ORDER — CHOLECALCIFEROL (VITAMIN D3) 10(400)/ML
125 DROPS ORAL DAILY
Qty: 1 | Refills: 3 | Status: ACTIVE | COMMUNITY
Start: 2024-08-29 | End: 1900-01-01

## 2024-09-09 ENCOUNTER — NON-APPOINTMENT (OUTPATIENT)
Age: 74
End: 2024-09-09

## 2024-09-09 DIAGNOSIS — R19.7 DIARRHEA, UNSPECIFIED: ICD-10-CM

## 2024-09-09 DIAGNOSIS — Z87.898 PERSONAL HISTORY OF OTHER SPECIFIED CONDITIONS: ICD-10-CM

## 2024-09-10 ENCOUNTER — LABORATORY RESULT (OUTPATIENT)
Age: 74
End: 2024-09-10

## 2024-09-11 ENCOUNTER — NON-APPOINTMENT (OUTPATIENT)
Age: 74
End: 2024-09-11

## 2024-09-11 ENCOUNTER — APPOINTMENT (OUTPATIENT)
Dept: HOME HEALTH SERVICES | Facility: HOME HEALTH | Age: 74
End: 2024-09-11

## 2024-09-11 VITALS
SYSTOLIC BLOOD PRESSURE: 126 MMHG | RESPIRATION RATE: 18 BRPM | DIASTOLIC BLOOD PRESSURE: 87 MMHG | TEMPERATURE: 97.7 F | HEART RATE: 80 BPM

## 2024-09-11 RX ORDER — PREDNISONE INTENSOL 5 MG/ML
5 SOLUTION, CONCENTRATE ORAL
Qty: 1 | Refills: 0 | Status: ACTIVE | COMMUNITY
Start: 2024-09-10

## 2024-09-13 ENCOUNTER — OUTPATIENT (OUTPATIENT)
Dept: OUTPATIENT SERVICES | Facility: HOSPITAL | Age: 74
LOS: 1 days | End: 2024-09-13
Payer: MEDICARE

## 2024-09-13 ENCOUNTER — APPOINTMENT (OUTPATIENT)
Dept: WOUND CARE | Facility: HOSPITAL | Age: 74
End: 2024-09-13
Payer: MEDICARE

## 2024-09-13 ENCOUNTER — TRANSCRIPTION ENCOUNTER (OUTPATIENT)
Age: 74
End: 2024-09-13

## 2024-09-13 DIAGNOSIS — S31.109D UNSPECIFIED OPEN WOUND OF ABDOMINAL WALL, UNSPECIFIED QUADRANT W/OUT PENETRATION INTO PERITONEAL CAVITY, SUBSEQUENT ENCOUNTER: ICD-10-CM

## 2024-09-13 DIAGNOSIS — L89.152 PRESSURE ULCER OF SACRAL REGION, STAGE 2: ICD-10-CM

## 2024-09-13 PROCEDURE — 99214 OFFICE O/P EST MOD 30 MIN: CPT | Mod: 95

## 2024-09-13 PROCEDURE — G0463: CPT

## 2024-09-13 NOTE — REVIEW OF SYSTEMS
[Diarrhea] : diarrhea [Skin Wound] : skin wound [As Noted in HPI] : as noted in HPI [Negative] : Constitutional [FreeTextEntry6] : trach to vent. duoneb and mucomyst [FreeTextEntry7] : c -difficile, tube feeds [FreeTextEntry9] : history RA [de-identified] : alert

## 2024-09-13 NOTE — ASSESSMENT
[FreeTextEntry1] : Patient sitting in hospital bed, alert, acknowledges myself with mouthing hello, present with her is her daughter Marysol. Patent with extensive medical history, DM - on insulin, HTN, HLD, RA, Hypothyroidism. In 2022 patient was in [facility] - hypercapnic respiratory failure, since then tracheostomy to ventilator, PEG with feeds, prior to that pt. had Covid daughter reports. Pt. resides at home, has nursing, part of house calls program. Today visit is for concerns over sacral wound, rodrigo wound of peg, open draining area, and rodrigo anus excoriation from confirmed c - difficile, taking Dificid for c diff. placed on by ID, in addition small re- opening, of fistula? daughter reports pt. had this many years ago, had surgery to repair? unsure exactly what was done, as it was many years ago. Receives PEG feeds in addition has supplements of Alfred & Pro stat Has group 2 mattress Peg site - small open area, this is draining moderate to large serous fluid, currently washing area, dressing to absorb which must be changed several times/day, discussed using more absorbant product to help manage secretions with less changing Sacral - clean and pink, edges slight epibole, appears to be healing well, this was an unstageable at one point, using kaltostat, aquacel, was oozing blood that is why using kaltostat, discussed that just a tiny piece should be used and not packing entire wound with it Rodrigo anal area - raw, erythemic from constant loose stools, daughter wants a rectal tube so as to give skin a break from constant stools irritating skin, in addition there is open draining area, ? fistula, which if so the drainage can be erosive to skin 6/28/24 Daughter present with patient, patient sitting up in bed, trach. to vent, awake, alert, mouthing hello stools have slightly decreased, finished dificid, on probiotic, recently went to ER over fistula tract adjacent to peg site - it had been bleeding excessively , this was the prior site of peg tube, last visit had recommended drawtex, has gone back to using kaltostat and aquacel, noted to have serosang. drainage, depth is approx. 0.5 cm, not packing in, only laying on top of wound, rodrigo wound slightly erythemic sacral - clean , rodrigo wound improved, using kaltostat inside wound/aquacel perineal irriatation improved on feeds, free water turning & positioning 7/19/24 Daughter present with mom, pt. in bed, trach. to ventilator, awake, alert. Issues continue with former PEG site, continues to drain copious secretions, I had ordered drawtex last visiti, daughter feels it is not working, when watched how being packed, i do not feel it is going deep enough, approx depth is 1.5 cm, rodrigo wound erythema, thie rodrigo wound condition has improved. Now there is apparently a fistula contributing to the constant draiange. sacral - clean and pink, rodrigo wound is moist, crusting area 9/13/24 daughter present with mom, awake, alert, trach to ventilator Since last visit, pt. has been back to the hospital to have the fistula adjacent to peg site, have clips placed, per daughter, initially drainage had lessened, at present drainage is enough to have to change 2-3x/day with super absorber drawtex, depth on that is 8.5 cm, rodrigo wound is clean, c/w cavilon, can crust if needed or thin layer of zinc Sacral wound depth is 0.4 cm - discussed trying a new product, rodrigo wound has been applying triad and coconut oil, discussed how triad dries out extensively

## 2024-09-13 NOTE — HISTORY OF PRESENT ILLNESS
[FreeTextEntry1] : Patient sitting in hospital bed, alert, acknowledges myself with mouthing hello, present with her is her daughter Marysol. Patent with extensive medical history, DM - on insulin, HTN, HLD, RA, Hypothyroidism. In 2022 patient was in [facility] - hypercapnic respiratory failure, since then tracheostomy to ventilator, PEG with feeds, prior to that pt. had Covid daughter reports. Pt. resides at home, has nursing, part of house calls program. Today visit is for concerns over sacral wound, rodrigo wound of peg, open draining area, and rodrigo anus excoriation from confirmed c - difficile, in addition small re- opening, of fistula? daughter reports pt. had this many years ago, had surgery to repair? unsure exactly what was done, as it was many years ago. Receives PEG feeds in addition has supplements of Alfred & Pro stat Has group 2 mattress Peg site - small open area, this is draining moderate to large serous fluid, currently washing area, dressing to absorb which must be changed several times/day, discussed using more absorbant product to help manage secretions with less changing Sacral - clean and pink, edges slight epibole, appears to be healing well, this was an unstageable at one point, using kaltostat, aquacel, was oozing blood that is why using kaltostat, discussed that just a tiny piece should be used and not packing entire wound with it Rodrigo anal area - raw, erythemic from constant loose stools, daughter wants a rectal tube so as to give skin a break from constant stools irritating skin, in addition there is open draining area, ? fistula, which if so the drainage can be erosive to skin

## 2024-09-13 NOTE — PHYSICAL EXAM
[Skin Ulcer] : ulcer [Alert] : alert [Calm] : calm [Please See PDF for [employer] Analytics] : Please See PDF for [employer]. [de-identified] : NAD, well groomed, sitting up in bed [de-identified] : c difficile [de-identified] : bedbound

## 2024-09-13 NOTE — PLAN
[FreeTextEntry1] : 6 /14/24 Plan - orders to be e mailed to daughter bed settings - should never be on static except if changing linens and turning pt. Pt. would benefit from short term use of rectal tube, so as to give rodrigo anal skin a chance to be without constant irritation from stooling Sacral - advised to use dove liquid soap, unscented or skin sensitive, or baby shampoo to cleanse with, pack with aquacel, cover, rodrigo wound crusting technique explained, using cavilon/adapt powder then repeat 2-3 x so as to crust rodrigo wound, only use kaltostat small piece if oozing does not stop, may need silver nitrate to edges Rodrigo wound of feeding tube - cleanse soap and water, cavilon, drawtex super absorber to draining area/abdominal pad over Rodrigo anal - wash soap and water, may continue with mixture of zinc (thin layer), milk of magnesia, nystatin powder, cover draining area with aquacel offload/supplements follow up 2 weeks TEB 6/28/24 Plan - sacral - avoid kaltostat only pack with aquacel, change daily and prn, rodrigo wound, c/w current treatment fistula site adjacent to peg - pack depth with aquacel, rodrigo wound protection, zinc, cavilon offloading follow up 3 weeks 7/19/24 Plan - there is an ongoing issue with the competency of the nurses coming to do wound care from [facility], I had suggested that pouching area with a drainable pouch could be an option, even possibly a vac? For now we will c/w pack with drawtex snuggly and piece to outside of wound, treat rodrigo wound with crusting sacral - c/w same offload follow up 2-3 weeks 9/13/24 Plan - will order revyve wound gel - to be applied to sacral wound, aquacel/alginate over, rodrigo wound, i would advise cavilon and zinc/offloading abdominal former peg site wound - c/w packing with drawtex, change prn follow up 4 weeks

## 2024-09-13 NOTE — REASON FOR VISIT
[Consultation] : a consultation visit [Family Member] : family member [Home] : at home, [unfilled] , at the time of the visit. [Medical Office: ([facility])___] : at the medical office located in  [FreeTextEntry1] : sacral/perineal/g tube [FreeTextEntry2] : trisha [FreeTextEntry3] : daughter [FreeTextEntry4] : Hailey NP

## 2024-09-17 ENCOUNTER — RX RENEWAL (OUTPATIENT)
Age: 74
End: 2024-09-17

## 2024-09-17 ENCOUNTER — NON-APPOINTMENT (OUTPATIENT)
Age: 74
End: 2024-09-17

## 2024-09-17 ENCOUNTER — TRANSCRIPTION ENCOUNTER (OUTPATIENT)
Age: 74
End: 2024-09-17

## 2024-09-19 ENCOUNTER — NON-APPOINTMENT (OUTPATIENT)
Age: 74
End: 2024-09-19

## 2024-09-20 ENCOUNTER — NON-APPOINTMENT (OUTPATIENT)
Age: 74
End: 2024-09-20

## 2024-09-20 DIAGNOSIS — K59.09 OTHER CONSTIPATION: ICD-10-CM

## 2024-09-20 RX ORDER — POLYETHYLENE GLYCOL 3350 17 G/17G
17 POWDER, FOR SOLUTION ORAL DAILY
Qty: 30 | Refills: 6 | Status: ACTIVE | COMMUNITY
Start: 2024-09-20

## 2024-09-23 DIAGNOSIS — H04.123 DRY EYE SYNDROME OF BILATERAL LACRIMAL GLANDS: ICD-10-CM

## 2024-09-24 ENCOUNTER — NON-APPOINTMENT (OUTPATIENT)
Age: 74
End: 2024-09-24

## 2024-09-24 DIAGNOSIS — L89.152 PRESSURE ULCER OF SACRAL REGION, STAGE 2: ICD-10-CM

## 2024-09-25 NOTE — PROVIDER CONTACT NOTE (CRITICAL VALUE NOTIFICATION) - RECOMMENDATIONS
24     Patient Name: Mariajose Rao  Referring Provider: Farzaneh Hernandez MD  Patient Seen By: Melvin Walker MS, [facility] [place]    Mariajose Rao met with me on 2024 for genetic consultation due to their personal history of cystic nephroma, thyroid cancer, and uterine adenosarcoma among other clinical manifestations as well as a family history of thyroid cancer.  The history below was obtained from your patient and their medical records.  This letter summarizes our visit.      This was a telehealth visit via live interactive video with a secure connection. This visit was not recorded or stored. Consent for telehealth visit was verified including risk of electronic data, possibility of equipment failure or need for in-person visit if condition cannot be fully assessed by telehealth.      Provider location: Home  Patient Location: Home they were accompanied by their father Negro    Reason for visit: Genetic counseling    Starting time of visit: 2:00 PM  Ending time of visit: 3:05 PM       HPI:    CANCER HISTORY:  Mariajose Rao was diagnosed with papillary thyroid cancer at age 10. They had their thyroid removed as treatment in addition to radiation.    Mariajose Rao was also diagnosed with a small uterine adenosarcoma at age 20. This was removed through a dilation and curettage. They will have a second one next week.    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  The above information is based on the patient's report only.    REPRODUCTIVE HISTORY:  Menarche: 14   Menopause: Premenopausal     They are   Oral  No action be take, lab was venous not arterial contraceptive use: No  HRT: No    FAMILY HISTORY:  A complete 3 generation family history was obtained from your patient  Cancer family history:    Relative Cancer Site Age at Diagnosis Other Relevant History   Mariajose Rao Thyroid  Uterine Adeno- sarcoma 10  20 Currently 20 ; History of Cystic Nephroma at 6 months, lung cysts, atypical moles   Father Thyroid 43 Currently 54   Paternal Uncle Thyroid 45 Currently 54   Paternal First Cousin Thyroid 14 Currently 18   Paternal First Cousin Thyroid 18 Currently 24   Paternal First Cousin Thyroid 14 Currently 22   Maternal Great Aunt Breast 63  63   Maternal Great Uncle Colorectal 50s  50s     Additional Family History  Mariajose Rao reports a larger extent of thyroid issues in paternal relatives, like goiter, nodules, etc  Maternal ancestry: Western/Northern   Paternal ancestry:   Ashkenazi Spiritism ancestry: No  Consanguinity: No  No previous genetic testing reported in the family  See pedigree (scanned into the Media tab) for additional family history    Unless otherwise noted, this family history is based upon patient report and not confirmed by medical records.  Significant differences in the actual family history may change your patient's risk assessment.    Testing Guidelines:  Mariajose Rao's personal history warrants genetic testing for DICER1, which should be considered in individuals with multiple tumors aligning with DICER1. For Mariajose Rao, this includes: cystic nephroma, thyroid adenoma, pulmonary cysts, and uterine adenosarcoma. Additionally, NCCN states that individuals with two or more first degree relatives who have had thyroid cancer may be candidates for genetic testing. Given the extensive family and personal history, genetic testing is warranted.    DISCUSSION  We discussed that most cases of thyroid cancer are sporadic; however, approximately 5%-10% have a familial component and may be due  to an identifiable pathogenic variant. The most common type of thyroid cancer, accounting for more than 90% of all cases, is non-medullary thyroid cancer (NMTC). Approximately 3%-10% of NMTC cases have a familial component. Unlike sporadic cases, hereditary thyroid cancers are often characterized by earlier disease onset and may also be syndromic, with other multi-system features. While thyroid cancer can occur at any age, the average age of onset for female assigned at birth individuals is in their 40's or 50's and for male assigned at birth individuals is in their 60's or 70's. The genetics of papillary and follicular thyroid cancer are complex and not well-understood. We know that genes play an important role in PTC and FTC because individuals who have a close family member with the disease are 4-8 times more likely to get thyroid cancer themselves. There are a number of hereditary syndromes that increase your likelihood of developing several types of cancer, including thyroid cancer, but these conditions are extremely rare.    Thryoid cancer in children is rare, but is more common in adolescent individuals assigned female at birth. Papillary thyroid cancer is the most common type that children develop. Risk factors include radiation, autoimmune disorders, iodine deficiency, and certain genetic syndromes like DICER1, Li-Fraumeni, PTEN, APC, and others. It is thought that genetics explains only about 5% of pediatric thyroid cancers. Some of these are described below:    Pathogenic germline DICER1 variants cause a hereditary cancer predisposition with a variety of manifestations. These include pleuropulmonary blastomas (PPB) and pulmonary cysts (lung, 25-40%), thyroid gland neoplasia (like goiters, adenomas, or cancer) (75% risk by 40), ovarian tumors (like Sertoli Leydig cell tumors) (10%), and cystic nephromas (kidney, 10%). Rhabdomyosarcomas of the soft tissue, dental anomalies, ocular abnormalities, brain  tumors, and macrocephaly (42%) (large head size) have also been reported.    The majority of tumors occur in individuals younger than age 40 years. PPB typically presents in infants and children younger than age six years. Ovarian sex cord-stromal tumors are most often diagnosed before age 40 years. Cystic nephroma generally presents in young children but has also been reported in adolescents.      The de heidi rate of DICER1 pathogenic variants is 20%. This means that in 80% of cases, the pathogenic variant was inherited from an unaffected parent. DICER1 syndrome is associated with low penetrance. This means that not everyone with a pathogenic variant in the DICER1 gene will display symptoms. A lack of a family history or lack of other DICER1 syndrome related tumors therefore might be misleading.     The screening and management for individuals with a DICER1 pathogenic variant are targeted at the various cancer and tumor risks, and also depend on the ages of patients. For example, screening for pleuropulmonary blastomas is frequent until the age of 12. If someone is discovered to have a pathogenic variant past the age of 12, a baseline chest CT or X-ray is recommended. The same is true for kidney and eye tumors. Other tumor risks pose risks for a longer period of time and warrant annual screenings, like eye tumors, nasal tumors, and ovarian tumors.Thyroid screening should be every 3-5 years. More specific screening recommendations will be provided should Mariajose Rao be identified to have a DICER1 pathogenic variant.    Pathogenic/likely pathogenic variants in TP53 are associated with Li-Fraumeni syndrome (LFS) which is a cancer predisposition syndrome associated with high risks for a diverse spectrum of childhood- and adult-onset malignancies. The lifetime risk of cancer in individuals with LFS is >=70% for those assigned male at birth and >=90% for those assigned female at birth. Five cancer types account  for the majority of LFS tumors: adrenocortical carcinomas, breast cancer, central nervous system tumors, osteosarcomas, and soft-tissue sarcomas. LFS is associated with an increased risk of several additional cancers including leukemia, lymphoma, gastrointestinal cancers, cancers of head and neck, kidney, larynx, lung, skin (e.g., melanoma), ovary, pancreas, prostate, testis, and thyroid. Individuals with LFS are at increased risk for cancer in childhood and young adulthood; survivors are at increased risk for multiple primary cancers.    We talked about how some families with both thyroid cancer and breast cancer might meet criteria for Cowden syndrome (also known as PTEN hamartoma tumor syndrome (PHTS). Cowden syndrome is an inherited condition characterized clinically by hamartomas, macrocephaly, mucocutaneous lesions, and an increased lifetime risk of various malignancies including breast, endometrial, thyroid, renal, skin, brain, and colorectal cancer. Individuals need to meet certain clinical criteria to be clinically diagnosed with Cowden syndrome or be recommended for testing specifically for PTEN. Currently, no one in the family meets criteria for Cowden syndrome.    APC-associated polyposis conditions are a group of conditions caused by pathogenic variants in the APC gene. They are characterized by an increased risk for multiple precancerous (adenomas) polyps in the colon as well as throughout other areas of the body. We reviewed the different forms of APC related polyposis conditions:  Familial adenomatous polyposis (FAP) is characterized by 100s - 1000s of colorectal adenomas during the lifetime. These polyps often begin in late childhood (around age 16) and ~95% of individuals with FAP have colon polyps by age 35. Colon cancer is inevitable in classic FAP if colectomy is not performed, with the median age of 39. There is an increased risk for other cancers such as duodenal cancers (up to 10%), thyroid  cancer (~1-12%), hepatoblastoma (~1-2.5%, usually by age 5), medulloblastoma (<1%), gastric cancer (up to 7%), and pancreatic cancer (1-2%). Other possible findings associated with FAP include the following: congenital hypertrophy of retinal pigment epithelium (CHRPE), osteomas (bony growths), dental abnormalities, desmoid tumors (10-24%), epidermoid cysts, and gastric fundic gland polyps.  Attenuated familial adenomatous polyposis (AFAP) is characterized by 10 - 100 colorectal adenomas during the lifetime. These polyps often begin around age 30. The lifetime risk for colorectal cancer with AFAP is ~70% with an average age of diagnosis around age 50. There is an increased risk for other cancers such as duodenal cancers (~4-12%) and thyroid cancer (~1-2%) as well as the upper GI findings also seen in classic FAP. The extraintestinal manifestations seen in FAP (such as CHRPE and desmoids) are unusual to see in AFAP.  Gastric adenocarcinoma and proximal polyposis of the stomach (GAPPS) is characterized by gastric fundic gland polyposis and increased risk of gastric cancer. There is limited colonic involvement with GAPPS.    Uterine adenosarcomas are a rare type of uterine sarcoma. They have been seen in patients ranging from the age of 13 to 94. Risk factors may include endometriosis, estrogen exposure, and other estrogen modifiers. There have also been cases of uterine adenosarcomas described in individuals with DICER1.    Mariajose Rao was counseled that aspects of their family history such as related cancers across successive generations, multiple rarer tumor types, and young onset disease may reflect a hereditary cancer risk.  The benefits, risks, and limitations of genetic testing were discussed at length including the relevant medical, insurance, psychosocial, and familial implications.      We also discussed the possibility of discovering a variant of uncertain significance (VUS). The option of multi-gene  panel testing for more rare causes of hereditary cancer was also discussed in detail.   The concept of high risk, moderate risk, and newly described genetic pathogenic variants was reviewed with your patient, and they understand recommendations for future surgery and screening depend on the current literature, which may change with additional research.  This discussion included consideration of the reported family history, the possibility of identifying a VUS, and limitations in the currently available risks and management guidelines for other gene pathogenic variants.  Depending on the panel, the chance to discover a VUS is estimated to approach 30%.      Autosomal dominant and recessive inheritance was reviewed.  Your patient realizes they are at risk for having a pathogenic variant in a gene, or genes, which may contribute to the reported family history. Mariajose Rao understands their test results may alter their recommended medical management, including consideration of intensive surveillance or prophylactic surgeries, and impact other family members.       The carrier status of family members who choose not to pursue genetic testing may be able to infer their status by results of others who are tested.   We also briefly reviewed the protections and limitations of the Genetic Information Nondiscrimination Act (JEREMIAS).  Documentation of genetic testing results will be entered into your patient's electronic medical record in our hospital system.    All of Mariajose Rao's questions were answered to their satisfaction and they demonstrated a clear understanding of the information and considerations discussed today.    Plan:  Mariajose Rao elected to proceed with testing today by a panel of 8 genes associated with hereditary thyroid cancer risks and syndromes.   Informed consent was obtained, a blood sample will be obtained, and the specimen will be sent to [facility] for analysis.    Results are  typically returned in 2-4 weeks, and I will call Mariajose Rao at that time to discuss the implications.      Time:   Time Spent with Patient: 65 minutes with greater than 50% of the time spent face-to-face in counseling, discussion, and coordination of care.     Sincerely,  Melvin Walker, [facility] [place]    [phone]    Send to:  No ref. provider found (fax: )  Karan Carr MD (fax: [phone])  Mariajose Rao

## 2024-09-28 NOTE — SWALLOW FEES ASSESSMENT ADULT - PRELIMINARY ENDOSCOPIC EXAMINATIONS
"  Emergency Department Note      History of Present Illness     Chief Complaint   Abdominal Pain    HPI     Karyna Allen is a 22 year old female who presents to the emergency department for abdominal pain. The patient states that tonight at 1730, she began experiencing a sudden onset of a waxing and waning right lower quadrant abdominal pain. She adds that this pain was \"intense\" for 1 hour in which she got up and walked outside with slight improvement of this pain but the pain has since worsened. She notes that she was also slightly dizzy earlier tonight. She reports that yesterday, she also experienced 1 episode of diarrhea. Denies any abnormal vaginal discharge. Denies prior abdominal surgeries. She reports that her menstrual cycle began 2 days ago. She is on azithromycin.    Independent Historian   None    Review of External Notes   None    Past Medical History     Medical History and Problem List   No past pertinent medical history.    Medications   The patient is currently on no regular medications.    Physical Exam     Patient Vitals for the past 24 hrs:   BP Temp Temp src Pulse Resp SpO2   09/27/24 1955 115/81 97.5  F (36.4  C) Temporal 81 16 99 %     Physical Exam      HEENT:    Oropharynx is moist  Eyes:    Conjunctiva normal  Neck:     Supple, no meningismus.     CV:     Regular rate and rhythm.      No murmurs, rubs or gallops.     No lower extremity edema.  PULM:    Clear to auscultation bilateral.       No respiratory distress.      Good air exchange.     No rales or wheezing.     No stridor.  ABD:    Soft, non-distended.       Mild-moderate tenderness in the RLQ.     Bowel sounds normal.     No pulsatile masses.       No rebound, guarding or rigidity.     No CVA tenderness.      Negative Rovsing's sign  MSK:     No gross deformity to all four extremities.   LYMPH:   No cervical lymphadenopathy.  NEURO:   Alert.  Good muscular tone, no atrophy.   Skin:    Warm, dry and intact.    Psych:    Mood is " good and affect is appropriate.      Diagnostics     Lab Results   Labs Ordered and Resulted from Time of ED Arrival to Time of ED Departure   ROUTINE UA WITH MICROSCOPIC REFLEX TO CULTURE - Abnormal       Result Value    Color Urine Straw      Appearance Urine Clear      Glucose Urine Negative      Bilirubin Urine Negative      Ketones Urine Negative      Specific Gravity Urine 1.003      Blood Urine Large (*)     pH Urine 6.5      Protein Albumin Urine 10 (*)     Urobilinogen Urine Normal      Nitrite Urine Negative      Leukocyte Esterase Urine Negative      Bacteria Urine Few (*)     RBC Urine 25 (*)     WBC Urine 1      Squamous Epithelials Urine <1      Hyaline Casts Urine 1     BASIC METABOLIC PANEL - Normal    Sodium 136      Potassium 4.2      Chloride 99      Carbon Dioxide (CO2) 22      Anion Gap 15      Urea Nitrogen 10.4      Creatinine 0.63      GFR Estimate >90      Calcium 9.4      Glucose 88     HCG QUALITATIVE PREGNANCY - Normal    hCG Serum Qualitative Negative     CBC WITH PLATELETS AND DIFFERENTIAL    WBC Count 10.5      RBC Count 4.55      Hemoglobin 12.9      Hematocrit 39.4      MCV 87      MCH 28.4      MCHC 32.7      RDW 12.0      Platelet Count 340      % Neutrophils 59      % Lymphocytes 32      % Monocytes 6      % Eosinophils 4      % Basophils 0      % Immature Granulocytes 0      NRBCs per 100 WBC 0      Absolute Neutrophils 6.2      Absolute Lymphocytes 3.3      Absolute Monocytes 0.6      Absolute Eosinophils 0.4      Absolute Basophils 0.0      Absolute Immature Granulocytes 0.0      Absolute NRBCs 0.0     RBC AND PLATELET MORPHOLOGY    RBC Morphology Confirmed RBC Indices      Platelet Assessment        Value: Automated Count Confirmed. Platelet morphology is normal.       Imaging   CT Abdomen Pelvis w Contrast   Final Result   IMPRESSION:    1.  Negative abdomen and pelvis CT. Normal appendix.      US Pelvis Cmpl wo Transvaginal w Abd/Pel Duplex Lmt   Final Result   IMPRESSION:      1.  Right ovary not visualized due to bowel gas.      2.  No free fluid in pelvis.      3.  Left ovary unremarkable.      4.  Uterus and endometrium unremarkable.                 Independent Interpretation   None    ED Course      Medications Administered   Medications   ketorolac (TORADOL) injection 15 mg (15 mg Intravenous Not Given 9/27/24 2022)   CT scan flush (51 mLs Intravenous $Given 9/27/24 2314)   iopamidol (ISOVUE-370) solution 500 mL (55 mLs Intravenous $Given 9/27/24 2314)       Procedures   Procedures     Discussion of Management   None    ED Course   ED Course as of 09/28/24 0049   Fri Sep 27, 2024   2006 I obtained history and examined the patient as noted above.        Additional Documentation  None    Medical Decision Making / Diagnosis     CMS Diagnoses: None    MIPS   None    MDM     Karyna Allen is a 22 year old female presents with an abrupt onset of right lower quadrant.  Urinalysis reveals hematuria although there may be a degree of vaginal contamination.  Initial concern was for ovarian cyst rupture.  Pelvic ultrasound was undertaken.  Right ovary was not well-visualized but there is no free fluid in the abdomen/pelvis to suggest cyst rupture.  Due to the possibility of ureteral stone or appendicitis, CT scan was performed and is negative for acute pathology.  Differential diagnosis would include mesenteric adenitis, endometriosis, mittelschmerz, menstrual related pain, viral illness.  Patient safer discharge home with ibuprofen and Tylenol as needed for pain.  Return ED for any worsening symptoms    Disposition   The patient was discharged.     Diagnosis     ICD-10-CM    1. Right lower quadrant abdominal pain  R10.31            Discharge Medications   There are no discharge medications for this patient.    Scribe Disclosure:  I, Juan Alberto Garcia, am serving as a scribe at 7:59 PM on 9/27/2024 to document services personally performed by No att. providers found based on my observations and  the provider's statements to me.        Haider Deng MD  09/28/24 0051     with pooling and passive repeated laryngeal penetration near interarytenoid space- at the start of the study prior to trials of po

## 2024-09-30 ENCOUNTER — TRANSCRIPTION ENCOUNTER (OUTPATIENT)
Age: 74
End: 2024-09-30

## 2024-09-30 ENCOUNTER — APPOINTMENT (OUTPATIENT)
Dept: HOME HEALTH SERVICES | Facility: HOME HEALTH | Age: 74
End: 2024-09-30
Payer: MEDICARE

## 2024-09-30 ENCOUNTER — NON-APPOINTMENT (OUTPATIENT)
Age: 74
End: 2024-09-30

## 2024-09-30 DIAGNOSIS — R14.3 FLATULENCE: ICD-10-CM

## 2024-09-30 DIAGNOSIS — Z93.1 GASTROSTOMY STATUS: ICD-10-CM

## 2024-09-30 DIAGNOSIS — J96.12 CHRONIC RESPIRATORY FAILURE WITH HYPERCAPNIA: ICD-10-CM

## 2024-09-30 PROCEDURE — 99348 HOME/RES VST EST LOW MDM 30: CPT

## 2024-10-01 ENCOUNTER — TRANSCRIPTION ENCOUNTER (OUTPATIENT)
Age: 74
End: 2024-10-01

## 2024-10-01 ENCOUNTER — NON-APPOINTMENT (OUTPATIENT)
Age: 74
End: 2024-10-01

## 2024-10-01 ENCOUNTER — LABORATORY RESULT (OUTPATIENT)
Age: 74
End: 2024-10-01

## 2024-10-01 ENCOUNTER — APPOINTMENT (OUTPATIENT)
Dept: HOME HEALTH SERVICES | Facility: HOME HEALTH | Age: 74
End: 2024-10-01

## 2024-10-02 ENCOUNTER — NON-APPOINTMENT (OUTPATIENT)
Age: 74
End: 2024-10-02

## 2024-10-02 ENCOUNTER — TRANSCRIPTION ENCOUNTER (OUTPATIENT)
Age: 74
End: 2024-10-02

## 2024-10-02 ENCOUNTER — LABORATORY RESULT (OUTPATIENT)
Age: 74
End: 2024-10-02

## 2024-10-03 ENCOUNTER — APPOINTMENT (OUTPATIENT)
Dept: HOME HEALTH SERVICES | Facility: HOME HEALTH | Age: 74
End: 2024-10-03

## 2024-10-03 RX ORDER — DEXTRAN 70/HYPROMELLOSE 0.1%-0.3%
0.1-0.3 DROPS OPHTHALMIC (EYE)
Qty: 1 | Refills: 0 | Status: ACTIVE | COMMUNITY
Start: 2024-09-23

## 2024-10-03 RX ORDER — MULTIVIT/FOLIC ACID/HERBAL 275 400-200/30
LIQUID (ML) ORAL DAILY
Qty: 1 | Refills: 0 | Status: ACTIVE | COMMUNITY
Start: 2024-09-23

## 2024-10-04 DIAGNOSIS — F41.9 ANXIETY DISORDER, UNSPECIFIED: ICD-10-CM

## 2024-10-04 DIAGNOSIS — F32.A ANXIETY DISORDER, UNSPECIFIED: ICD-10-CM

## 2024-10-04 RX ORDER — LEVOTHYROXINE SODIUM 0.12 MG/1
125 TABLET ORAL DAILY
Qty: 30 | Refills: 2 | Status: ACTIVE | COMMUNITY
Start: 2024-10-04 | End: 1900-01-01

## 2024-10-04 RX ORDER — ESCITALOPRAM OXALATE 10 MG/1
10 TABLET ORAL DAILY
Qty: 30 | Refills: 2 | Status: ACTIVE | COMMUNITY
Start: 2024-10-04 | End: 1900-01-01

## 2024-10-07 ENCOUNTER — TRANSCRIPTION ENCOUNTER (OUTPATIENT)
Age: 74
End: 2024-10-07

## 2024-10-07 ENCOUNTER — NON-APPOINTMENT (OUTPATIENT)
Age: 74
End: 2024-10-07

## 2024-10-07 NOTE — PATIENT PROFILE ADULT - NSPROHMDIABETBLDGLCTST_GEN_A_NUR
Highly concerning for breast cancer.  On imaging.  There is also enlarged axillary nodes.    The plan is for her to go to the [facility] facility on Tuesday for a scheduled biopsy at noon    greater than 3 times/day

## 2024-10-07 NOTE — ED PROVIDER NOTE - PHYSICAL EXAMINATION
On exam pt is chronically ill appearing, vented, coarse breath sounds bilaterally, abdomen soft, nt, nd, +PEG in place with minimal excoriates at insertion site, clear output from PEG, +hemorrhoids with minimal dried blood, no active bleeding, +contractures all 4 extremities.

## 2024-10-07 NOTE — H&P ADULT - NSHPPHYSICALEXAM_GEN_ALL_CORE
HEENT: NCAT / + Tracheostomy ( # 8 xlt Mat)   Cardiac:  S1/S2, RR  Pulmonary: Bilateral Coarse breath sounds  GI: Protuberant , No rebound, No guarding / + G-J TUBE / Prior G-tube stoma with mild leakage / + Large external Hemmorrhoids with slight bright red blood HEAD: NCAT  NECK:  + Tracheostomy ( # 8 xlt Mat)   Cardiac:  S1/S2, RR  Pulmonary: Bilateral Coarse breath sounds  GI: Protuberant , No rebound, No guarding / + G-J TUBE / Prior G-tube stoma with mild leakage / + Large external Hemmorrhoids with slight bright red blood noted   Neuro: Sedated but Arousable  PSYCH: No signs of Agitation   : + Primafit / No external Vulva irritation   Lines: + PIV / No central line

## 2024-10-07 NOTE — H&P ADULT - PROBLEM SELECTOR PLAN 8
Patient has known persistent GJ tube leakage    -Kept Npo p MN 8/11 for planned GC Fistula repair procedure with GI Dr Rosales on 8/12.     - Patient is now s/p EGD for closure of Gastric Fistula (8/12) w/ Total of 3 Mantis clips and two 22 mm Microtech clips by GI Dr Rosales   - TF resumed to Goal rate    - Old stoma peg site with mild purulent drainage s/p repair of fistula; advised by GI dressing changes with alcohol pads/ betadine daily; Outpatient follow up w/ GI in 1-2d   - Call Dr Rosales as directed . - Patient has known hx of prior G-Tube stoma leak   - S/p EGD for closure of Gastric Fistula (8/12) w/ Total of 3 Mantis clips and two 22 mm Microtech clips by GI Dr Rosales   - Old stoma peg site with mild clear drainage s/p repair of fistula  - As per daughter feeds and meds all administered via j-port and g-tube remains to continuos drainage

## 2024-10-07 NOTE — H&P ADULT - PROBLEM SELECTOR PLAN 6
- Continue Standing/ Breakthrough Tylenol   - Continue Gabapentin / PRN Oxy   - Continue Fentanyl patch and Gabapentin

## 2024-10-07 NOTE — H&P ADULT - PROBLEM SELECTOR PLAN 9
- Patients daughter provided with full medical update at bedside this evening - Na 130+   - As per daughter pt usually receives 70 cc/hr of free water flushes per hr   - Will decrease free water flushes to 20 cc q hr to prevent j-tube from clogging  - Monitor AM BMP - Na 130+   - As per daughter pt usually receives 70 cc/hr of free water flushes per hr   - Will decrease free water flushes to 20 cc q hr to prevent j-tube from clogging  - Patient on sodium chloride 1 gm daily at home   - will increase dose to 1 gram q 12 hrs   - Monitor AM BMP

## 2024-10-07 NOTE — PROGRESS NOTE ADULT - PROBLEM SELECTOR PLAN 1
hypoxic to 80s at home  - resolved with bagging and suctioning by family members  - O2 sat 96-98% on FiO2 40% upon arrival to ED  - chronic trach to vent - #8 distal XLT shiley cuffed  - mechanical vent: volume control 18/350/5/40%  - chest xray in ED - opacification of right middle lobe  - continue zosyn for possible pneumonia  - does not wean as vent dependent  - airway management with duonebs, chest PT and suction PRN

## 2024-10-07 NOTE — H&P ADULT - PROBLEM SELECTOR PLAN 1
- Patient with Chronic Trach at baseline   - Current Vent Settings: Volume AC :  RR: 18 PEEP: 5 FIO2: 40%  - CXR 10/7: Opacification of the right middle lobe may represent pneumonia versus atelectasis  - Received Rocephin and Azithro in the ED   - Sputum cx ordered ( Pending) - Patient with Chronic Trach at baseline   - Current Vent Settings: Volume AC :  RR: 18 PEEP: 5 FIO2: 40%  - CXR 10/7: Opacification of the right middle lobe may represent pneumonia versus atelectasis  - Received Rocephin and Azithro in the ED   - Sputum cx ordered ( Pending)  - ID Consult in AM - Patient with Chronic Trach at baseline   - Current Vent Settings: Volume AC :  RR: 18 PEEP: 5 FIO2: 40%  - CXR 10/7: Opacification of the right middle lobe may represent pneumonia versus atelectasis  - Received Rocephin and Azithro in the ED   - Sputum cx ordered ( Pending)  - Prior Sputum cx grew CRE PSA  - Will initiate Cefepime   - ID Consult in AM

## 2024-10-07 NOTE — ED PROVIDER NOTE - CLINICAL SUMMARY MEDICAL DECISION MAKING FREE TEXT BOX
n/a
Imtiaz: pt with episode of respiratory distress and rectal bleeding since this morning. signed out to Dr. Taveras for further work up.

## 2024-10-07 NOTE — PROGRESS NOTE ADULT - ASSESSMENT
75 yo F PMH T2DM on insulin, HTN, HLD, hypothyroidism, RA on Prednisone, Fibromyalgia, cerebral aneurysm s/p repair, chronic hypercapnic respiratory failure s/p trach (vent dependent) and chronic PEG 2022, recurrent C. diff infection (follows with Dr. Newman), persistent GJ tube leaks now s/p GC fistula repair 8/12 BIBEMS with daughter for respiratory distress, hypoxia to high 80s.  Pt also noted to have rectal bleeding this past week requiring transfusion 5 days ago in ED as per daughter.  In ED, pt afebrile, fiO2 96-98% on 40% on ventilator.  Chest Xray w/ opacification of right lung concerning for possible PNA.  Admitted to RCU for further management.

## 2024-10-07 NOTE — PROGRESS NOTE ADULT - SUBJECTIVE AND OBJECTIVE BOX
Patient is a 74y old  Female who presents with a chief complaint of     Interval Events:    REVIEW OF SYSTEMS:  [ ] Positive  [ ] All other systems negative  [ ] Unable to assess ROS because ________    Vital Signs Last 24 Hrs  T(C): 38.6 (10-07-24 @ 18:19), Max: 38.6 (10-07-24 @ 17:24)  T(F): 101.4 (10-07-24 @ 18:19), Max: 101.5 (10-07-24 @ 17:24)  HR: 105 (10-07-24 @ 18:36) (94 - 116)  BP: 119/55 (10-07-24 @ 18:19) (101/64 - 159/60)  RR: 18 (10-07-24 @ 18:19) (14 - 21)  SpO2: 100% (10-07-24 @ 18:36) (96% - 100%)    PHYSICAL EXAM:  HEENT:   [ ]Tracheostomy:  [ ]Pupils equal  [ ]No oral lesions  [ ]Abnormal    SKIN  [ ]No Rash  [ ] Abnormal  [ ] pressure    CARDIAC  [ ]Regular  [ ]Abnormal    PULMONARY  [ ]Bilateral Clear Breath Sounds  [ ]Normal Excursion  [ ]Abnormal    GI  [ ]PEG      [ ] +BS		              [ ]Soft, nondistended, nontender	  [ ]Abnormal    MUSCULOSKELETAL                                   [ ]Bedbound                 [ ]Abnormal    [ ]Ambulatory/OOB to chair                           EXTREMITIES                                         [ ]Normal  [ ]Edema                           NEUROLOGIC  [ ] Normal, non focal  [ ] Focal findings:    PSYCHIATRIC  [ ]Alert and appropriate  [ ] Sedated	 [ ]Agitated    :  Mcbride: [ ] Yes, if yes: Date of Placement:                   [  ] No    LINES: Central Lines [ ] Yes, if yes: Date of Placement                                     [  ] No    HOSPITAL MEDICATIONS:  MEDICATIONS  (STANDING):  chlorhexidine 0.12% Liquid 15 milliLiter(s) Oral Mucosa every 12 hours  escitalopram 10 milliGRAM(s) Oral once  QUEtiapine 12.5 milliGRAM(s) Oral Once    MEDICATIONS  (PRN):      LABS:                        8.5    14.80 )-----------( 145      ( 07 Oct 2024 12:37 )             29.8     10-07    130[L]  |  93[L]  |  29[H]  ----------------------------<  276[H]  4.1   |  26  |  <0.30[L]    Ca    8.7      07 Oct 2024 12:37  Mg     2.1     10-07    TPro  6.9  /  Alb  2.9[L]  /  TBili  0.4  /  DBili  x   /  AST  45[H]  /  ALT  35  /  AlkPhos  91  10-07    PT/INR - ( 07 Oct 2024 12:37 )   PT: 13.2 sec;   INR: 1.15 ratio         PTT - ( 07 Oct 2024 12:37 )  PTT:30.5 sec  Urinalysis Basic - ( 07 Oct 2024 16:28 )    Color: Yellow / Appearance: Clear / SG: >1.030 / pH: x  Gluc: x / Ketone: Negative mg/dL  / Bili: Negative / Urobili: 0.2 mg/dL   Blood: x / Protein: Negative mg/dL / Nitrite: Positive   Leuk Esterase: Negative / RBC: 0 /HPF / WBC 1 /HPF   Sq Epi: x / Non Sq Epi: 0 /HPF / Bacteria: Negative /HPF        Venous Blood Gas:  10-07 @ 15:06  7.33/56/36/30/62.8  VBG Lactate: 2.8  Venous Blood Gas:  10-07 @ 12:20  7.25/70/29/31/39.6  VBG Lactate: 3.0    CAPILLARY BLOOD GLUCOSE    MICROBIOLOGY:     RADIOLOGY:  [ ] Reviewed and interpreted by me    Mode: AC/ CMV (Assist Control/ Continuous Mandatory Ventilation)  RR (machine): 18  TV (machine): 350  FiO2: 40  PEEP: 5  ITime: 0.74  MAP: 11  PIP: 36   Patient is a 74y old  Female who presents with a chief complaint of     Interval Events:    REVIEW OF SYSTEMS:  [ ] Positive  [ ] All other systems negative  [ ] Unable to assess ROS because ________    Vital Signs Last 24 Hrs  T(C): 38.6 (10-07-24 @ 18:19), Max: 38.6 (10-07-24 @ 17:24)  T(F): 101.4 (10-07-24 @ 18:19), Max: 101.5 (10-07-24 @ 17:24)  HR: 105 (10-07-24 @ 18:36) (94 - 116)  BP: 119/55 (10-07-24 @ 18:19) (101/64 - 159/60)  RR: 18 (10-07-24 @ 18:19) (14 - 21)  SpO2: 100% (10-07-24 @ 18:36) (96% - 100%)    PHYSICAL EXAM:  HEENT:   [ ]Tracheostomy:  [ ]Pupils equal  [ ]No oral lesions  [ ]Abnormal    SKIN  [ ]No Rash  [ ] Abnormal  [ ] pressure    CARDIAC  [ ]Regular  [ ]Abnormal    PULMONARY  [ ]Bilateral Clear Breath Sounds  [ ]Normal Excursion  [ ]Abnormal    GI  [ ]PEG      [ ] +BS		              [ ]Soft, nondistended, nontender	  [ ]Abnormal    MUSCULOSKELETAL                                   [ ]Bedbound                 [ ]Abnormal    [ ]Ambulatory/OOB to chair                           EXTREMITIES                                         [ ]Normal  [ ]Edema                           NEUROLOGIC  [ ] Normal, non focal  [ ] Focal findings:    PSYCHIATRIC  [ ]Alert and appropriate  [ ] Sedated	 [ ]Agitated    :  Mcbride: [ ] Yes, if yes: Date of Placement:                   [  ] No    LINES: Central Lines [ ] Yes, if yes: Date of Placement                                     [  ] No    HOSPITAL MEDICATIONS:  MEDICATIONS  (STANDING):  chlorhexidine 0.12% Liquid 15 milliLiter(s) Oral Mucosa every 12 hours  escitalopram 10 milliGRAM(s) Oral once  QUEtiapine 12.5 milliGRAM(s) Oral Once    MEDICATIONS  (PRN):    LABS:                        8.5    14.80 )-----------( 145      ( 07 Oct 2024 12:37 )             29.8     10-07    130[L]  |  93[L]  |  29[H]  ----------------------------<  276[H]  4.1   |  26  |  <0.30[L]    Ca    8.7      07 Oct 2024 12:37  Mg     2.1     10-07    TPro  6.9  /  Alb  2.9[L]  /  TBili  0.4  /  DBili  x   /  AST  45[H]  /  ALT  35  /  AlkPhos  91  10-07    PT/INR - ( 07 Oct 2024 12:37 )   PT: 13.2 sec;   INR: 1.15 ratio         PTT - ( 07 Oct 2024 12:37 )  PTT:30.5 sec  Urinalysis Basic - ( 07 Oct 2024 16:28 )    Color: Yellow / Appearance: Clear / SG: >1.030 / pH: x  Gluc: x / Ketone: Negative mg/dL  / Bili: Negative / Urobili: 0.2 mg/dL   Blood: x / Protein: Negative mg/dL / Nitrite: Positive   Leuk Esterase: Negative / RBC: 0 /HPF / WBC 1 /HPF   Sq Epi: x / Non Sq Epi: 0 /HPF / Bacteria: Negative /HPF    Venous Blood Gas:  10-07 @ 15:06  7.33/56/36/30/62.8  VBG Lactate: 2.8  Venous Blood Gas:  10-07 @ 12:20  7.25/70/29/31/39.6  VBG Lactate: 3.0    CAPILLARY BLOOD GLUCOSE    MICROBIOLOGY:     RADIOLOGY:  [ ] Reviewed and interpreted by me    Mode: AC/ CMV (Assist Control/ Continuous Mandatory Ventilation)  RR (machine): 18  TV (machine): 350  FiO2: 40  PEEP: 5  ITime: 0.74  MAP: 11  PIP: 36 no syncope/no chills/no diaphoresis/no dizziness/no fever/no shortness of breath/no vomiting/no nausea/no back pain/no chest pain/no congestion

## 2024-10-07 NOTE — H&P ADULT - PROBLEM SELECTOR PLAN 4
- Patient on Lantus and Humalog at home   - Will resume Lantus this evening but hold Humalog tonight as patient was not fed during the day in the ED   - Will place patient on ISS this evening - Patient on Lantus and Humalog at home   - Will give 1/2 dose of Lantus this evening but hold Humalog tonight as patient was not fed during the day in the ED   - Will place patient on ISS this evening

## 2024-10-07 NOTE — H&P ADULT - PROBLEM SELECTOR PLAN 2
- Patient with Hx of external Hemmorrhoids  - Bleeding Hemmorrhoids noted on exam   - G-J Tube flushed and lavaged no evidence of active bleeding   - Occult 10/7: Positive  - Monitor CBC / + Active Type and screen - Patient with Hx of external Hemmorrhoids  - Bleeding Hemmorrhoids noted on exam   - G-J Tube flushed and lavaged no evidence of active bleeding   - CT ABD / Pelvis 10/7: Unchanged gastrocutaneous fistula. Gastrojejunostomy tube is intact.  No focal intra-abdominal/pelvic or subcutaneous collections  - Occult 10/7: Positive  - Monitor CBC / + Active Type and screen

## 2024-10-07 NOTE — ED ADULT NURSE REASSESSMENT NOTE - NS ED NURSE REASSESS COMMENT FT1
At this time daughter wants to speak to MD prior to patient receiving antibiotics or BP medication. MD Kitchen made aware, will speak to patient's daughter.

## 2024-10-07 NOTE — ED ADULT NURSE NOTE - PATIENT'S PREFERRED PRONOUN
Labs prior to f/u 4mos Cbc,cmp, LDH prior to f/u  Ambulatory Referral to Nephrology-Dr. Lal  Her/She

## 2024-10-07 NOTE — ED ADULT NURSE NOTE - OBJECTIVE STATEMENT
73YO female with pmhx of HTN, hypothyroid, fibromyalgia, rheumatoid arthritis, diabetes, tracheostomy and PEG comes from home via ems with c/o rectal bleed. As per EMS patient destated to the 70s, home nurse provided suction, AMBU bag with no improvement, pt FiO2 is normally 30, required increase to 40 for a saturation of 96-97%. Daughter states when changing patient to leave, noticed "katie red blood" in depends. Pt presents more lethargic per daughter. Pt has dark brown discharge from J-tube daughter states is abnormal.

## 2024-10-07 NOTE — PATIENT PROFILE ADULT - FUNCTIONAL ASSESSMENT - BASIC MOBILITY 6.
1-calculated by average/Not able to assess (calculate score using [facility] averaging method)

## 2024-10-07 NOTE — H&P ADULT - NS ATTEND AMEND GEN_ALL_CORE FT
Patient with history as per above  Possible RML pneumonia vs atelectasis accounting for rotated position on CXR. Mild hypoxia and leukocytosis, afebrile. Received Ceftriaxone and azithromycin in the ED, given h/o CRE PSA, broaden to Cefepime, consult ID Dr. Newman who has followed the patient in the past, given her history of recurrent C diff infections. Follow up all cultures.   Admit to RCU for continued management  Remainder of plan as per above.

## 2024-10-07 NOTE — ED PROVIDER NOTE - ATTENDING CONTRIBUTION TO CARE
Dr. Taveras: I have personally performed a face to face bedside history and physical examination of this patient. I have discussed the history, examination, review of systems, assessment and plan of management with the resident. I have reviewed the electronic medical record and amended it to reflect my history, review of systems, physical exam, assessment and plan.

## 2024-10-07 NOTE — ED ADULT NURSE REASSESSMENT NOTE - NS ED NURSE REASSESS COMMENT FT1
called MAR for medication order and make aware of FS. MAR isnt sure, if theyre covering will need to review assignments.

## 2024-10-07 NOTE — PROGRESS NOTE ADULT - PROBLEM SELECTOR PLAN 3
chronic GJ tube  - hx of chronic persistent leaking  - s/p GC fistula repair 8/12  - tube feeds as tolerated

## 2024-10-07 NOTE — H&P ADULT - PROBLEM SELECTOR PLAN 5
- Continue home prednisone 5 mg daily   - Pt receives Enbrel injections q Thursday ( Will hold in setting of infection)

## 2024-10-07 NOTE — H&P ADULT - ASSESSMENT
75 yo F PMH T2DM on insulin, HTN, HLD, hypothyroidism, RA on Prednisone, Fibromyalgia, cerebral aneurysm s/p repair, chronic hypercapnic respiratory failure s/p trach (vent dependent) and chronic PEG 2022, recurrent C. diff infection (follows with Dr. Newman), persistent GJ tube leaks now s/p GC fistula repair 8/12 BIBEMS with daughter for respiratory distress, hypoxia to high 80s.  Pt also noted to have rectal bleeding this past week requiring transfusion 5 days ago in ED as per daughter.  In ED, pt afebrile, fiO2 96-98% on 40% on ventilator.  Chest Xray w/ opacification of right lung concerning for possible PNA.  Admitted to RCU for further management.      73 yo F PMH T2DM on insulin, HTN, HLD, hypothyroidism, RA on Prednisone, Fibromyalgia, cerebral aneurysm s/p repair, chronic hypercapnic respiratory failure s/p trach (vent dependent) and chronic PEG 2022, recurrent C. diff infection (follows with Dr. 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  Pt also noted to have rectal bleeding this past week requiring transfusion 5 days ago in ED as per daughter. Daughter also reports brownish discharge from G-J tube. In ED, pt afebrile, fiO2 96-98% on 40% on ventilator.  Chest Xray w/ opacification of right lung concerning for possible PNA.  Admitted to RCU for further management.

## 2024-10-07 NOTE — PROGRESS NOTE ADULT - PROBLEM SELECTOR PLAN 2
presented to ED 5 days ago with BRBPR  - transfused in ED  - monitor for signs of bleeding  - monitor CBC

## 2024-10-07 NOTE — ED PROVIDER NOTE - CARE PLAN
1 Principal Discharge DX:	Acute respiratory distress  Secondary Diagnosis:	GI bleed   Principal Discharge DX:	Acute respiratory distress  Secondary Diagnosis:	GI bleed  Secondary Diagnosis:	Bandemia   Principal Discharge DX:	Acute respiratory distress  Secondary Diagnosis:	GI bleed  Secondary Diagnosis:	Bandemia  Secondary Diagnosis:	Community acquired pneumonia

## 2024-10-07 NOTE — ED PROVIDER NOTE - PROGRESS NOTE DETAILS
Dr. Taveras: Recd sign out from Dr. Kitchen. 74-year-old female BIBEMS chronically vented at home here with daughter for respiratory distress, hypoxia to high 80s.  Upon arrival respiratory therapy suctioned patient and patient's oxygenation improved.  Patient placed on the vent via her trach OB here.  As per daughter at bedside patient takes acetaminophen every 6 hours.  Daughter also noticed some bright red blood per rectum over the past few days but improved today.  Also noticed that patient has excoriations around the insertion of the PEG tube as well as brown discharge from the tube.  No nausea, vomiting, fevers, chills, chest pain.  On exam pt is chronically ill appearing, vented, coarse breath sounds bilaterally, abdomen soft, nt, nd, +PEG in place with minimal excoriates at insertion site, clear output from PEG, +hemorrhoids with minimal dried blood, no active bleeding, +contractures all 4 extremities.   Pt found to have rectal temp 99.1, given she recently took Tylenol concern for infection, infectious labs sent. Pt found to be bandemic with ?CAP, will cover with abx, get CT a/p, reassess, admit

## 2024-10-08 ENCOUNTER — NON-APPOINTMENT (OUTPATIENT)
Age: 74
End: 2024-10-08

## 2024-10-08 NOTE — PROGRESS NOTE ADULT - ASSESSMENT
73 yo F PMH T2DM on insulin, HTN, HLD, hypothyroidism, RA on Prednisone, Fibromyalgia, cerebral aneurysm s/p repair, chronic hypercapnic respiratory failure s/p trach (vent dependent) and chronic PEG 2022, recurrent C. diff infection (follows with Dr. 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  Pt also noted to have rectal bleeding this past week requiring transfusion 5 days ago in ED as per daughter. Daughter also reports brownish discharge from G-J tube. In ED, pt afebrile, fiO2 96-98% on 40% on ventilator.  Chest Xray w/ opacification of right lung concerning for possible PNA.  Admitted to RCU for further management.      73 yo F PMH T2DM on insulin, HTN, HLD, hypothyroidism, RA on Prednisone, Fibromyalgia, cerebral aneurysm s/p repair, chronic hypercapnic respiratory failure s/p trach (vent dependent) and chronic PEG 2022, recurrent C. diff infection (follows with Dr. 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  Pt also noted to have rectal bleeding this past week requiring transfusion 5 days ago in ED as per daughter. Daughter also reports brownish discharge from G-J tube. In ED, pt afebrile, fiO2 96-98% on 40% on ventilator.  Chest X-ray w/ opacification of right lung concerning for possible PNA.  Admitted to RCU for further management.     10/8: Continue Cefepime for 5 days for PNA, trach evaluated and in good position.  ENT examined ears and no cerumen.  GI to possibly change PEJ when fever curve lessens on Wednesday or Thursday.  Regular insulin re-started 3x/day. and QHS Lantus.

## 2024-10-08 NOTE — PROGRESS NOTE ADULT - NS ATTEND AMEND GEN_ALL_CORE FT
75 yo F h/o DM2 on insulin, HTN, HLD, hypothyroidism, RA on Prednisone, Fibromyalgia, cerebral aneurysm s/p repair, chronic hypoxemia and hypercapnic respiratory failure, tracheostomy and vent dependent, recurrent C. 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  Chest Xray with opacification in the right mid lung field concerning for possible pneumonia.  Admitted to the RCU for further management.     1. Neuro - awake and alert, orientated, mouthing words. Daughter concerned about hearing loss and impacted cerumen - request ENT eval  h/o anxiety - c/w low dose Seroquel and Lexapro  2. Pulm - trach and vent dependent, daily PS trials, trach care. Blood tinged secretions noted today - minimize deep suctioning. Bronchodilators and Sputum cultures  3. CVS-HD stable, c/w Norvasc  4. GI - GC fistula s/p clipping 8/12 - consult GI, Dr. Rosalse, re: further management. Stoma with chronic leak, positional. No further rectal bleeding. Monitor h/h. Tube feeds, nutrition consult  5. ID - RML pneumonia - c/w Cefepime 5 day course, monitor for diarrhea, low threshold to send infectious work up. Appreciate ID eval and recs. Follow up cultures  6. Heme - anemia, no further rectal bleeding episodes, GJ tube flushed without evidence of bleeding. monitor H/H. DVT ppx - SCDs  7. Endo - ISS, monitor FS  8. Rheum - c/w prednisone and chronic pain management  9. Derm - wound care consult for sacrum and gastric stoma wound  above discussed with the patient's daughter and  at length at bedside, all questions answered.

## 2024-10-08 NOTE — PROGRESS NOTE ADULT - TIME BILLING
review of EMR, labs, imaging, medical management, coordination of care, discussion with family, patient, RCU team and consultants.

## 2024-10-08 NOTE — PROGRESS NOTE ADULT - PROBLEM SELECTOR PLAN 4
- Patient on Lantus and Humalog at home   - Will give 1/2 dose of Lantus this evening but hold Humalog tonight as patient was not fed during the day in the ED   - Will place patient on ISS this evening - Patient on Lantus and Humalog at home   - Will give 1/2 dose of Lantus this evening but hold Humalog tonight as patient was not fed during the day in the ED   - Regular insulin re-started 3x/day

## 2024-10-08 NOTE — PROGRESS NOTE ADULT - PROBLEM SELECTOR PLAN 9
- Na 130+   - As per daughter pt usually receives 70 cc/hr of free water flushes per hr   - Will decrease free water flushes to 20 cc q hr to prevent j-tube from clogging  - Patient on sodium chloride 1 gm daily at home   - will increase dose to 1 gram q 12 hrs   - Monitor AM BMP

## 2024-10-08 NOTE — CONSULT NOTE ADULT - ATTENDING COMMENTS
As above  74F wT2DM on insulin, HTN, HLD, hypothyroidism, RA on Prednisone, Fibromyalgia, cerebral aneurysm s/p repair, chronic hypercapnic respiratory failure s/p trach (vent dependent) and chronic PEG 2022, recurrent C. diff infection, persistent GJ tube leaks now s/p GC fistula repair 8/12 with subsequent continued leakage here for hypoxia and fever  Possible pneumonia  Multiple GI complaints as well  Dr. Rosales - primary GI - asking for assistance to replace ?malfunctioning GJ as well as attempt fistula clipping again while inpatient, not amenable to OVESCO or suturing given esophageal stensosis and already failed Roman Ibrahima closure.  Note clipping likely not long term solution as mucosal closure only.    Can tentatively plan for GJ replacement and clipping of fistula when medically optimized - ?Thursday  Defer discussion of additional chronic GI symptoms to outpatient GI appt w Dr. Rosales    Thank you for this interesting consult.  Please call the advanced GI service with any questions or concerns.

## 2024-10-08 NOTE — CONSULT NOTE ADULT - PROBLEM SELECTOR RECOMMENDATION 9
- F/u audiogram   - ENT will continue to follow  - Call with questions or concerns Hearing Loss  - F/u audiogram   - ENT will continue to follow  - Call with questions or concerns    Tracheostomy in Place  -Care as per primary team  -No current visible bleeding. If there is any persistent bleed please contact ENT as needed

## 2024-10-08 NOTE — CONSULT NOTE ADULT - ASSESSMENT
73 yo F PMH T2DM on insulin, HTN, HLD, hypothyroidism, RA on Prednisone, Fibromyalgia, cerebral aneurysm s/p repair, chronic hypercapnic respiratory failure s/p trach (vent dependent) and chronic PEG 2022, recurrent C. diff infection , persistent GJ tube leaks now s/p GC fistula repair 8/12  admitted 10/7/24 with resp distress and found to have RML pneumonia    Antibiotics  Ceftriaxone 10/7  Azithro 10/7  Cefepime 10/7-->    Suggest  Aspirate trach for sputum culture  continue Cefepime - hopefully 5 day course will suffice  monitor for diarrhea  - will review vaccination status: Prevnar 20, Covid, Flu, RSV  follow CXR to assure resolution   continue chest PT. Resp therapy for ?RML syndrome

## 2024-10-08 NOTE — CONSULT NOTE ADULT - SUBJECTIVE AND OBJECTIVE BOX
HPI:  MAXX LOPEZ is a 73 yo F PMH T2DM on insulin, HTN, HLD, hypothyroidism, RA on Prednisone, Fibromyalgia, cerebral aneurysm s/p repair, chronic hypercapnic respiratory failure s/p trach (vent dependent) and chronic PEG 2022, recurrent C. diff infection (follows with Dr. Newman), persistent GJ tube leaks now s/p GC fistula repair 8/12 BIBEMS with daughter for respiratory distress, hypoxia to high 80s.  Pt also noted to have rectal bleeding this past week requiring transfusion 5 days ago in ED as per daughter.  In ED, pt afebrile, fiO2 96-98% on 40% on ventilator.  Chest Xray w/ opacification of right lung concerning for possible PNA.  Admitted to RCU for further management.    Advanced GI team was consulted by Dr. Rosales for GJ tube exchange and prior PEG site closure.     Per daughter, patient's prior PEG site stoma is still leaking liquids and gas when the venting G tube was not working well. Patient has also been very bloated, gassy with nausea lately, but no vomiting. Patient has loose, pasty stools at baseline. Currently no bleeding.     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 500 milliGRAM(s) Enteral Tube every 6 hours  acetaminophen   Oral Liquid .. 500 milliGRAM(s) Oral every 12 hours PRN  acetylcysteine 10%  Inhalation 4 milliLiter(s) Inhalation every 12 hours  albuterol/ipratropium for Nebulization 3 milliLiter(s) Nebulizer every 6 hours  amLODIPine   Tablet 10 milliGRAM(s) Oral <User Schedule>  artificial  tears Solution 1 Drop(s) Both EYES every 6 hours  ascorbic acid 500 milliGRAM(s) Oral daily  Biotene Dry Mouth Oral Rinse 5 milliLiter(s) Swish and Spit every 6 hours  calcium carbonate    500 mG (Tums) Chewable 1 Tablet(s) Chew two times a day PRN  calcium carbonate 1250 mG  + Vitamin D (OsCal 500 + D) 1 Tablet(s) Oral <User Schedule>  cefepime   IVPB 1000 milliGRAM(s) IV Intermittent every 12 hours  chlorhexidine 0.12% Liquid 15 milliLiter(s) Oral Mucosa every 12 hours  chlorhexidine 4% Liquid 1 Application(s) Topical daily  dextrose 5%. 1000 milliLiter(s) IV Continuous <Continuous>  dextrose 5%. 1000 milliLiter(s) IV Continuous <Continuous>  dextrose 50% Injectable 12.5 Gram(s) IV Push once  dextrose 50% Injectable 25 Gram(s) IV Push once  dextrose 50% Injectable 25 Gram(s) IV Push once  diphenhydrAMINE Elixir 12.5 milliGRAM(s) Oral every 6 hours PRN  escitalopram 10 milliGRAM(s) Oral <User Schedule>  fentaNYL   Patch  25 MICROgram(s)/Hr 1 Patch Transdermal every 72 hours  ferrous    sulfate Liquid 300 milliGRAM(s) Oral daily  gabapentin Solution 250 milliGRAM(s) Oral <User Schedule>  glucagon  Injectable 1 milliGRAM(s) IntraMuscular once  hemorrhoidal Ointment 1 Application(s) Rectal at bedtime  influenza  Vaccine (HIGH DOSE) 0.5 milliLiter(s) IntraMuscular once  insulin lispro (ADMELOG) corrective regimen sliding scale   SubCutaneous every 6 hours  lactobacillus acidophilus 1 Tablet(s) Oral <User Schedule>  levothyroxine 125 MICROGram(s) Oral daily  lidocaine   4% Patch 1 Patch Transdermal every 24 hours  melatonin Liquid 12 milliGRAM(s) Oral <User Schedule>  multivitamin/minerals 1 Tablet(s) Oral daily  oxyCODONE    IR 2.5 milliGRAM(s) Oral every 6 hours PRN  pantoprazole  Injectable 40 milliGRAM(s) IV Push every 12 hours  prednisoLONE acetate 1% Suspension 1 Drop(s) Both EYES every 6 hours  predniSONE   Tablet 5 milliGRAM(s) Oral <User Schedule>  QUEtiapine 12.5 milliGRAM(s) Oral <User Schedule>  simethicone 160 milliGRAM(s) Chew <User Schedule>  sodium chloride 1 Gram(s) Oral every 12 hours  sodium chloride 0.65% Nasal 1 Spray(s) Both Nostrils every 6 hours  witch hazel Pads 1 Application(s) Topical every 12 hours        PHYSICAL EXAM:   Vital Signs:  Vital Signs Last 24 Hrs  T(C): 38.2 (08 Oct 2024 11:54), Max: 38.6 (07 Oct 2024 17:24)  T(F): 100.8 (08 Oct 2024 11:54), Max: 101.5 (07 Oct 2024 17:24)  HR: 93 (08 Oct 2024 15:26) (82 - 116)  BP: 93/51 (08 Oct 2024 11:54) (93/51 - 156/100)  BP(mean): 115 (07 Oct 2024 17:24) (115 - 115)  RR: 18 (08 Oct 2024 11:54) (17 - 18)  SpO2: 95% (08 Oct 2024 15:26) (95% - 100%)    Parameters below as of 08 Oct 2024 14:35  Patient On (Oxygen Delivery Method): ventilator      Daily     Daily     GENERAL: Curled up in bed, no acute distress  NEURO: Not opening eyes, and no response to verbal or tactile stimuli  HEENT: Anicteric sclera  CHEST: vent via trach; no accessory muscle use  CARDIAC: regular rate  ABDOMEN: Soft, but bloated. L-abdomen piror stoma covered with drainage bag. GJ site with granulation tissues. Tube is mobile, but discolored within the tube. Yellow output via the venting G; tube feeding ongoing without disturbances.   EXTREMITIES: Warm, curled up    LABS: reviewed                        7.1    14.09 )-----------( 113      ( 08 Oct 2024 04:36 )             25.6     10-08    132[L]  |  99  |  18  ----------------------------<  251[H]  3.8   |  23  |  <0.30[L]    Ca    8.1[L]      08 Oct 2024 03:38  Phos  3.4     10-08  Mg     1.8     10-08    TPro  5.7[L]  /  Alb  2.4[L]  /  TBili  0.4  /  DBili  x   /  AST  24  /  ALT  24  /  AlkPhos  75  10-08    LIVER FUNCTIONS - ( 08 Oct 2024 03:38 )  Alb: 2.4 g/dL / Pro: 5.7 g/dL / ALK PHOS: 75 U/L / ALT: 24 U/L / AST: 24 U/L / GGT: x             Culture - Blood (collected 07 Oct 2024 12:00)  Source: .Blood BLOOD  Preliminary Report (08 Oct 2024 15:01):    No growth at 24 hours    Culture - Blood (collected 07 Oct 2024 11:50)  Source: .Blood BLOOD  Preliminary Report (08 Oct 2024 15:01):    No growth at 24 hours      < from: CT Abdomen and Pelvis w/ IV Cont (10.07.24 @ 14:21) >  FINDINGS:  LOWER CHEST: Bilateral interlobular septal thickening. Bibasilar   subsegmental atelectasis. Coronary artery and aortic calcifications.    LIVER: Hepatic steatosis.  BILE DUCTS: Normal caliber.  GALLBLADDER: Within normal limits.  SPLEEN: Within normal limits.  PANCREAS: Within normal limits.  ADRENALS: Within normal limits.  KIDNEYS/URETERS: No hydronephrosis. Right mid renal cortical scarring.   Bilateral renal hypodensities too small to characterize.    BLADDER: Within normal limits.  REPRODUCTIVE ORGANS: Uterus and adnexa within normal limits.    BOWEL: Gastrojejunostomy tube with tip in the jejunum. Unchanged   gastrocutaneous fistula. No bowel obstruction. Appendix is not visualized.  PERITONEUM/RETROPERITONEUM: Within normal limits.  VESSELS: Within normal limits.  LYMPH NODES: No lymphadenopathy.  ABDOMINAL WALL: No evidence of subcutaneous collection.  BONES: Chronic bilateral anterior and lateral rib fractures. Chronic left   inferior and superior pubic rami fractures. Unchanged vertebral body   height loss of T9 and T11-L5.    IMPRESSION:  Unchanged gastrocutaneous fistula. Gastrojejunostomy tube is intact.  No focal intra-abdominal/pelvic or subcutaneous collections.        --- End of Report ---    < end of copied text >

## 2024-10-08 NOTE — PROGRESS NOTE ADULT - SUBJECTIVE AND OBJECTIVE BOX
Patient is a 74y old  Female who presents with a chief complaint of Patient admitted with Hypoxemia and episode of Bright red blood per rectum (07 Oct 2024 18:58)    HPI:  73 yo F PMH T2DM on insulin, HTN, HLD, hypothyroidism, RA on Prednisone, Fibromyalgia, cerebral aneurysm s/p repair, chronic hypercapnic respiratory failure s/p trach (vent dependent) and chronic PEG 2022, recurrent C. diff infection (follows with Dr. Newman), persistent GJ tube leaks now s/p GC fistula repair 8/12 BIBEMS with daughter for respiratory distress, hypoxia to high 80s.  Pt also noted to have rectal bleeding this past week requiring transfusion 5 days ago in ED as per daughter.  In ED, pt afebrile, fiO2 96-98% on 40% on ventilator.  Chest Xray w/ opacification of right lung concerning for possible PNA.  Admitted to RCU for further management.      (07 Oct 2024 18:58)    Interval Events:    REVIEW OF SYSTEMS:  [ ] Positive  [ ] All other systems negative  [ ] Unable to assess ROS because ________    Vital Signs Last 24 Hrs  T(C): 38.2 (10-08-24 @ 11:54), Max: 38.6 (10-07-24 @ 17:24)  T(F): 100.8 (10-08-24 @ 11:54), Max: 101.5 (10-07-24 @ 17:24)  HR: 97 (10-08-24 @ 14:35) (82 - 116)  BP: 93/51 (10-08-24 @ 11:54) (93/51 - 156/100)  RR: 18 (10-08-24 @ 11:54) (17 - 18)  SpO2: 96% (10-08-24 @ 14:35) (96% - 100%)    PHYSICAL EXAM:  HEENT:   [ ]Tracheostomy:  [ ]Pupils equal  [ ]No oral lesions  [ ]Abnormal    SKIN  [ ] No Rash  [ ] Abnormal  [ ] pressure    CARDIAC  [ ]Regular  [ ]Abnormal    PULMONARY  [ ]Bilateral Clear Breath Sounds  [ ]Normal Excursion  [ ]Abnormal    GI  [ ]PEG      [ ] +BS		              [ ]Soft, nondistended, nontender	  [ ]Abnormal    MUSCULOSKELETAL                                   [ ]Bedbound                 [ ]Abnormal    [ ]Ambulatory/OOB to chair                           EXTREMITIES                                         [ ]Normal  [ ]Edema                           NEUROLOGIC  [ ] Normal, non focal  [ ] Focal findings:    PSYCHIATRIC  [ ]Alert and appropriate  [ ] Sedated	 [ ]Agitated    :  Mcbride: [ ] Yes, if yes: Date of Placement:                   [  ] No    LINES: Central Lines [ ] Yes, if yes: Date of Placement                                     [  ] No    HOSPITAL MEDICATIONS:  MEDICATIONS  (STANDING):  acetaminophen   Oral Liquid .. 500 milliGRAM(s) Enteral Tube every 6 hours  acetylcysteine 10%  Inhalation 4 milliLiter(s) Inhalation every 12 hours  albuterol/ipratropium for Nebulization 3 milliLiter(s) Nebulizer every 6 hours  amLODIPine   Tablet 10 milliGRAM(s) Oral <User Schedule>  artificial  tears Solution 1 Drop(s) Both EYES every 6 hours  ascorbic acid 500 milliGRAM(s) Oral daily  Biotene Dry Mouth Oral Rinse 5 milliLiter(s) Swish and Spit every 6 hours  calcium carbonate 1250 mG  + Vitamin D (OsCal 500 + D) 1 Tablet(s) Oral <User Schedule>  cefepime   IVPB 1000 milliGRAM(s) IV Intermittent every 12 hours  chlorhexidine 0.12% Liquid 15 milliLiter(s) Oral Mucosa every 12 hours  chlorhexidine 4% Liquid 1 Application(s) Topical daily  dextrose 5%. 1000 milliLiter(s) (100 mL/Hr) IV Continuous <Continuous>  dextrose 5%. 1000 milliLiter(s) (50 mL/Hr) IV Continuous <Continuous>  dextrose 50% Injectable 12.5 Gram(s) IV Push once  dextrose 50% Injectable 25 Gram(s) IV Push once  dextrose 50% Injectable 25 Gram(s) IV Push once  escitalopram 10 milliGRAM(s) Oral <User Schedule>  fentaNYL   Patch  25 MICROgram(s)/Hr 1 Patch Transdermal every 72 hours  ferrous    sulfate Liquid 300 milliGRAM(s) Oral daily  gabapentin Solution 250 milliGRAM(s) Oral <User Schedule>  glucagon  Injectable 1 milliGRAM(s) IntraMuscular once  hemorrhoidal Ointment 1 Application(s) Rectal at bedtime  influenza  Vaccine (HIGH DOSE) 0.5 milliLiter(s) IntraMuscular once  insulin lispro (ADMELOG) corrective regimen sliding scale   SubCutaneous every 6 hours  lactobacillus acidophilus 1 Tablet(s) Oral <User Schedule>  levothyroxine 125 MICROGram(s) Oral daily  lidocaine   4% Patch 1 Patch Transdermal every 24 hours  melatonin Liquid 12 milliGRAM(s) Oral <User Schedule>  multivitamin/minerals 1 Tablet(s) Oral daily  pantoprazole  Injectable 40 milliGRAM(s) IV Push every 12 hours  prednisoLONE acetate 1% Suspension 1 Drop(s) Both EYES every 6 hours  predniSONE   Tablet 5 milliGRAM(s) Oral <User Schedule>  QUEtiapine 12.5 milliGRAM(s) Oral <User Schedule>  simethicone 160 milliGRAM(s) Chew <User Schedule>  sodium chloride 1 Gram(s) Oral every 12 hours  sodium chloride 0.65% Nasal 1 Spray(s) Both Nostrils every 6 hours  witch hazel Pads 1 Application(s) Topical every 12 hours    MEDICATIONS  (PRN):  acetaminophen   Oral Liquid .. 500 milliGRAM(s) Oral every 12 hours PRN Moderate Pain (4 - 6)  calcium carbonate    500 mG (Tums) Chewable 1 Tablet(s) Chew two times a day PRN Gas  dextrose Oral Gel 15 Gram(s) Oral once PRN Blood Glucose LESS THAN 70 milliGRAM(s)/deciliter  diphenhydrAMINE Elixir 12.5 milliGRAM(s) Oral every 6 hours PRN Rash and/or Itching  oxyCODONE    IR 2.5 milliGRAM(s) Oral every 6 hours PRN Severe Pain (7 - 10)      LABS:                        7.1    14.09 )-----------( 113      ( 08 Oct 2024 04:36 )             25.6     10-08    132[L]  |  99  |  18  ----------------------------<  251[H]  3.8   |  23  |  <0.30[L]    Ca    8.1[L]      08 Oct 2024 03:38  Phos  3.4     10-08  Mg     1.8     10-08    TPro  5.7[L]  /  Alb  2.4[L]  /  TBili  0.4  /  DBili  x   /  AST  24  /  ALT  24  /  AlkPhos  75  10-08    PT/INR - ( 07 Oct 2024 12:37 )   PT: 13.2 sec;   INR: 1.15 ratio    PTT - ( 07 Oct 2024 12:37 )  PTT:30.5 sec      Venous Blood Gas:  10-07 @ 15:06  7.33/56/36/30/62.8  VBG Lactate: 2.8  Venous Blood Gas:  10-07 @ 12:20  7.25/70/29/31/39.6  VBG Lactate: 3.0      Mode: AC/ CMV (Assist Control/ Continuous Mandatory Ventilation)  RR (machine): 18  TV (machine): 350  FiO2: 40  PEEP: 5  ITime: 0.7  MAP: 14  PIP: 32 Patient is a 74y old  Female who presents with a chief complaint of Patient admitted with Hypoxemia and episode of Bright red blood per rectum (07 Oct 2024 18:58)    HPI:  73 yo F PMH T2DM on insulin, HTN, HLD, hypothyroidism, RA on Prednisone, Fibromyalgia, cerebral aneurysm s/p repair, chronic hypercapnic respiratory failure s/p trach (vent dependent) and chronic PEG 2022, recurrent C. diff infection (follows with Dr. Newman), persistent GJ tube leaks now s/p GC fistula repair 8/12 BIBEMS with daughter for respiratory distress, hypoxia to high 80s.  Pt also noted to have rectal bleeding this past week requiring transfusion 5 days ago in ED as per daughter.  In ED, pt afebrile, fiO2 96-98% on 40% on ventilator.  Chest Xray w/ opacification of right lung concerning for possible PNA.  Admitted to RCU for further management.      (07 Oct 2024 18:58)    Interval Events: Admitted from ED overnight    REVIEW OF SYSTEMS:  [ ] Positive  [x ] All other systems negative  [ ] Unable to assess ROS because ________    Vital Signs Last 24 Hrs  T(C): 38.2 (10-08-24 @ 11:54), Max: 38.6 (10-07-24 @ 17:24)  T(F): 100.8 (10-08-24 @ 11:54), Max: 101.5 (10-07-24 @ 17:24)  HR: 97 (10-08-24 @ 14:35) (82 - 116)  BP: 93/51 (10-08-24 @ 11:54) (93/51 - 156/100)  RR: 18 (10-08-24 @ 11:54) (17 - 18)  SpO2: 96% (10-08-24 @ 14:35) (96% - 100%)    PHYSICAL EXAM:    HEENT:   [x ]Tracheostomy: #8 distal XLT Shiley   [x ]Pupils equal  [x ]No oral lesions  [ ]Abnormal    SKIN  [ ]No Rash  [x] Abnormal: B/L buttocks/sacral stage 1-2 pressure injury present on admission   [ ] pressure    CARDIAC  [x ]Regular  [ ]Abnormal:    PULMONARY  [x ]Bilateral Clear Breath Sounds  [ ]Normal Excursion  [ ]Abnormal    GI  [x ]PEG J site c/d/I, old peg stoma site   [x ] +BS		              [x ]Soft, nondistended, no guarding or rebound tenderness   [ ]Abnormal    MUSCULOSKELETAL                                   [x ]Bedbound                 [ ]Abnormal    [ ]Ambulatory/OOB to chair                           EXTREMITIES                                         [ x]Normal  [ ]Edema                           NEUROLOGIC  [x ] Normal, non focal  [ ] Focal findings:    PSYCHIATRIC  [x ]Alert and appropriate  [ ] Sedated	 [ ]Agitated    :  Mcbride: [ ] Yes, if yes: Date of Placement:                   [ x ] No    LINES: Central Lines [ ] Yes, if yes: Date of Placement                                     [ x ] No    HOSPITAL MEDICATIONS:  MEDICATIONS  (STANDING):  acetaminophen   Oral Liquid .. 500 milliGRAM(s) Enteral Tube every 6 hours  acetylcysteine 10%  Inhalation 4 milliLiter(s) Inhalation every 12 hours  albuterol/ipratropium for Nebulization 3 milliLiter(s) Nebulizer every 6 hours  amLODIPine   Tablet 10 milliGRAM(s) Oral <User Schedule>  artificial  tears Solution 1 Drop(s) Both EYES every 6 hours  ascorbic acid 500 milliGRAM(s) Oral daily  Biotene Dry Mouth Oral Rinse 5 milliLiter(s) Swish and Spit every 6 hours  calcium carbonate 1250 mG  + Vitamin D (OsCal 500 + D) 1 Tablet(s) Oral <User Schedule>  cefepime   IVPB 1000 milliGRAM(s) IV Intermittent every 12 hours  chlorhexidine 0.12% Liquid 15 milliLiter(s) Oral Mucosa every 12 hours  chlorhexidine 4% Liquid 1 Application(s) Topical daily  dextrose 5%. 1000 milliLiter(s) (100 mL/Hr) IV Continuous <Continuous>  dextrose 5%. 1000 milliLiter(s) (50 mL/Hr) IV Continuous <Continuous>  dextrose 50% Injectable 12.5 Gram(s) IV Push once  dextrose 50% Injectable 25 Gram(s) IV Push once  dextrose 50% Injectable 25 Gram(s) IV Push once  escitalopram 10 milliGRAM(s) Oral <User Schedule>  fentaNYL   Patch  25 MICROgram(s)/Hr 1 Patch Transdermal every 72 hours  ferrous    sulfate Liquid 300 milliGRAM(s) Oral daily  gabapentin Solution 250 milliGRAM(s) Oral <User Schedule>  glucagon  Injectable 1 milliGRAM(s) IntraMuscular once  hemorrhoidal Ointment 1 Application(s) Rectal at bedtime  influenza  Vaccine (HIGH DOSE) 0.5 milliLiter(s) IntraMuscular once  insulin lispro (ADMELOG) corrective regimen sliding scale   SubCutaneous every 6 hours  lactobacillus acidophilus 1 Tablet(s) Oral <User Schedule>  levothyroxine 125 MICROGram(s) Oral daily  lidocaine   4% Patch 1 Patch Transdermal every 24 hours  melatonin Liquid 12 milliGRAM(s) Oral <User Schedule>  multivitamin/minerals 1 Tablet(s) Oral daily  pantoprazole  Injectable 40 milliGRAM(s) IV Push every 12 hours  prednisoLONE acetate 1% Suspension 1 Drop(s) Both EYES every 6 hours  predniSONE   Tablet 5 milliGRAM(s) Oral <User Schedule>  QUEtiapine 12.5 milliGRAM(s) Oral <User Schedule>  simethicone 160 milliGRAM(s) Chew <User Schedule>  sodium chloride 1 Gram(s) Oral every 12 hours  sodium chloride 0.65% Nasal 1 Spray(s) Both Nostrils every 6 hours  witch hazel Pads 1 Application(s) Topical every 12 hours    MEDICATIONS  (PRN):  acetaminophen   Oral Liquid .. 500 milliGRAM(s) Oral every 12 hours PRN Moderate Pain (4 - 6)  calcium carbonate    500 mG (Tums) Chewable 1 Tablet(s) Chew two times a day PRN Gas  dextrose Oral Gel 15 Gram(s) Oral once PRN Blood Glucose LESS THAN 70 milliGRAM(s)/deciliter  diphenhydrAMINE Elixir 12.5 milliGRAM(s) Oral every 6 hours PRN Rash and/or Itching  oxyCODONE    IR 2.5 milliGRAM(s) Oral every 6 hours PRN Severe Pain (7 - 10)      LABS:                        7.1    14.09 )-----------( 113      ( 08 Oct 2024 04:36 )             25.6     10-08    132[L]  |  99  |  18  ----------------------------<  251[H]  3.8   |  23  |  <0.30[L]    Ca    8.1[L]      08 Oct 2024 03:38  Phos  3.4     10-08  Mg     1.8     10-08    TPro  5.7[L]  /  Alb  2.4[L]  /  TBili  0.4  /  DBili  x   /  AST  24  /  ALT  24  /  AlkPhos  75  10-08    PT/INR - ( 07 Oct 2024 12:37 )   PT: 13.2 sec;   INR: 1.15 ratio    PTT - ( 07 Oct 2024 12:37 )  PTT:30.5 sec    Venous Blood Gas:  10-07 @ 15:06  7.33/56/36/30/62.8  VBG Lactate: 2.8  Venous Blood Gas:  10-07 @ 12:20  7.25/70/29/31/39.6  VBG Lactate: 3.0      Mode: AC/ CMV (Assist Control/ Continuous Mandatory Ventilation)  RR (machine): 18  TV (machine): 350  FiO2: 40  PEEP: 5  ITime: 0.7  MAP: 14  PIP: 32

## 2024-10-08 NOTE — CONSULT NOTE ADULT - NS ATTEND AMEND GEN_ALL_CORE FT
ENT consulted for decreased hearing    73 yo F PMH T2DM on insulin, HTN, HLD, hypothyroidism, RA on Prednisone, Fibromyalgia, cerebral aneurysm s/p repair, chronic hypercapnic respiratory failure s/p trach (vent dependent) and chronic PEG 2022, recurrent C. diff infection (follows with Dr. Newman), persistent GJ tube leaks now s/p GC fistula repair 8/12 BIBEMS with daughter for respiratory distress, hypoxia to high 80s.     Daughter states that patient has had history of b/l hearing loss L>R for the past 15 years however the right side appears to have suddenly gotten worse 2 weeks ago. Has history of hearing aids however lost them a long time ago. Daughter also states she has had blood coming from the tracheostomy tube during suctioning.     Tracheoscopy shows nick visualized, no purulence, no erythema, no granulation tissue or bleeding. Pt tolerated the procedure well without complications.    Physical exam shows bilateral normal EAC/TM. Trach in Place    Recommend:  Hearing Loss  - F/u audiogram   - ENT will continue to follow  - Call with questions or concerns    Tracheostomy in Place  -Care as per primary team  -No current visible bleeding. If there is any persistent bleed please contact ENT as needed

## 2024-10-08 NOTE — CONSULT NOTE ADULT - SUBJECTIVE AND OBJECTIVE BOX
Patient is a 74y old  Female who presents with a chief complaint of Patient admitted with Hypoxemia and episode of Bright red blood per rectum (08 Oct 2024 17:20)    HPI:  73 yo F PMH T2DM on insulin, HTN, HLD, hypothyroidism, RA on Prednisone, Fibromyalgia, cerebral aneurysm s/p repair, chronic hypercapnic respiratory failure s/p trach (vent dependent) and chronic PEG 2022, recurrent C. diff infection , persistent GJ tube leaks now s/p GC fistula repair 8/12 BIBEMS with daughter for respiratory distress, hypoxia to high 80s.  Pt also noted to have rectal bleeding this past week requiring transfusion 5 days ago in ED as per daughter.  In ED, pt afebrile, fiO2 96-98% on 40% on ventilator.  Chest Xray w/ opacification of right lung concerning for possible PNA.  Admitted to RCU for further management.      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      Social history:  , lives with daughter, no tob    FAMILY HISTORY:   unable to obtain   REVIEW OF SYSTEMS:     "      Allergies    walnut (Unknown)  metronidazole (Rash)  Lyrica (Unknown)  penicillin (Unknown)  Pineapple (Unknown)  Tagamet (Unknown)  Pecan, Filbert, Hazelnut (Unknown)  heparin (Unknown)  Pecans (Unknown)  Hazelnut (Unknown)  pineapple (Unknown)  meropenem (Rash)        Antimicrobials:  cefepime   IVPB 1000 milliGRAM(s) IV Intermittent every 12 hours      Vital Signs Last 24 Hrs  T(C): 37 (08 Oct 2024 13:30), Max: 38.6 (07 Oct 2024 18:19)  T(F): 98.6 (08 Oct 2024 13:30), Max: 101.4 (07 Oct 2024 18:19)  HR: 93 (08 Oct 2024 15:26) (82 - 109)  BP: 93/51 (08 Oct 2024 11:54) (93/51 - 130/64)  BP(mean): --  RR: 18 (08 Oct 2024 11:54) (17 - 18)  SpO2: 95% (08 Oct 2024 15:26) (95% - 100%)    Parameters below as of 08 Oct 2024 14:35  Patient On (Oxygen Delivery Method): ventilator        PHYSICAL EXAM:  General: WN/WD NAD, Non-toxic  Neurology: A&Ox3, nonfocal  Respiratory: Clear to auscultation bilaterally  CV: RRR, S1S2, no murmurs, rubs or gallops  Abdominal: Soft, Non-tender, non-distended, normal bowel sounds  Extremities: No edema, + peripheral pulses  Line Sites: Clear  Skin: No rash                          7.1    14.09 )-----------( 113      ( 08 Oct 2024 04:36 )             25.6   WBC Count: 14.09 (10-08 @ 04:36)  WBC Count: 14.80 (10-07 @ 12:37)    10-08    132[L]  |  99  |  18  ----------------------------<  251[H]  3.8   |  23  |  <0.30[L]    Ca    8.1[L]      08 Oct 2024 03:38  Phos  3.4     10-08  Mg     1.8     10-08    TPro  5.7[L]  /  Alb  2.4[L]  /  TBili  0.4  /  DBili  x   /  AST  24  /  ALT  24  /  AlkPhos  75  10-08        MICROBIOLOGY:  .Blood BLOOD  10-07-24   No growth at 24 hours  --  --      .Blood BLOOD  10-07-24   No growth at 24 hours  --  --      Clean Catch Clean Catch (Midstream)  10-03-24   Culture grew 3 or more types of organisms which indicate  collection contamination; consider recollection only if clinically  indicated.  --  --      Radiology:  < from: CT Abdomen and Pelvis w/ IV Cont (10.07.24 @ 14:21) >  FINDINGS:  LOWER CHEST: Bilateral interlobular septal thickening. Bibasilar   subsegmental atelectasis. Coronary artery and aortic calcifications.    LIVER: Hepatic steatosis.  BILE DUCTS: Normal caliber.  GALLBLADDER: Within normal limits.  SPLEEN: Within normal limits.  PANCREAS: Within normal limits.  ADRENALS: Within normal limits.  KIDNEYS/URETERS: No hydronephrosis. Right mid renal cortical scarring.   Bilateral renal hypodensities too small to characterize.    BLADDER: Within normal limits.  REPRODUCTIVE ORGANS: Uterus and adnexa within normal limits.    BOWEL: Gastrojejunostomy tube with tip in the jejunum. Unchanged   gastrocutaneous fistula. No bowel obstruction. Appendix is not visualized.  PERITONEUM/RETROPERITONEUM: Within normal limits.  VESSELS: Within normal limits.  LYMPH NODES: No lymphadenopathy.  ABDOMINAL WALL: No evidence of subcutaneous collection.  BONES: Chronic bilateral anterior and lateral rib fractures. Chronic left   inferior and superior pubic rami fractures. Unchanged vertebral body   height loss of T9 and T11-L5.    IMPRESSION:  Unchanged gastrocutaneous fistula. Gastrojejunostomy tube is intact.  No focal intra-abdominal/pelvic or subcutaneous collections.    < end of copied text >  < from: Xray Chest 1 View- PORTABLE-Urgent (10.07.24 @ 12:28) >  FINDINGS:  Tracheostomy.  Heart size is not accurately assessed on this AP projection.  Opacification of the right middle lobe. Bilateral lower lung field   platelike atelectasis.  There is no pneumothorax or pleural effusion.  No acute bony abnormality.    IMPRESSION:    Opacification of the right middle lobe may represent pneumonia versus   atelectasis.    < end of copied text >    Zion Newman MD; Division of Infectious Disease; Pager: [phone]; nights and weekends: [phone]

## 2024-10-08 NOTE — CONSULT NOTE ADULT - ASSESSMENT
73 yo F PMH T2DM on insulin, HTN, HLD, hypothyroidism, RA on Prednisone, Fibromyalgia, cerebral aneurysm s/p repair, chronic hypercapnic respiratory failure s/p trach (vent dependent) and chronic PEG 2022, recurrent C. diff infection (follows with Dr. Newman), persistent GJ tube leaks now s/p GC fistula repair 8/12 BIBEMS with daughter for respiratory distress, hypoxia to high 80s.  Pt also noted to have rectal bleeding this past week requiring transfusion 5 days ago in ED as per daughter.  In ED, pt afebrile, fiO2 96-98% on 40% on ventilator.  Chest Xray w/ opacification of right lung concerning for possible PNA.  Admitted to RCU for further management.    [facility] team was consulted by Dr. Rosales for GJ tube exchange and prior PEG site closure.     Assessment  #S/P GJ placement  #S/p endoscopic PEG site closure but with persistent leakage  #Hypoxia  #Hx of recurrent C. diff; loose BM, gas/bloating  - Currently under workup and treatment for hypoxia  - Hx of C. diff infections. Currently bloated with frequent loose BM per daughter  - S/p EGD 4/2024: Removal of 18Fr replacement G-tube; A large defect was noted in the PEG tract. Using the Roman-Ibrahima port site closure device with endoscopic guidance, two sutures were used to successfully close the defect. A 24 Fr EndoVive Safety gastrostomy tube was placed, and skin marking noted to be 4 cm at the external bumper. A 12 Fr EndoVive Through-The-PEG (TTP) PEG-J tube was then passed through the gastric tube. A resolution clip was used to advance the tube endoscopically to the jejunum. The clip was successfully deployed in jejunum to secure the tube.   - S/p EGD 8/2024:  A moderate-sized area of extrinsic compression was found at the cricopharyngeus likely secondary to neck anatomy and possible osteophyte compression to the neck along with the tracheostomy. A 9 mm fistula was found on the anterior wall of the gastric body. APC'ed and scraped with a cytology brush to improved closure. Unable to use Ovesco (OTSC) clip due to cricopharyngeal narrowing. Total of 3 Mantis clips and two 22 mm Microtech clips were used to for fistula closure. There was evidence of a gastrostomy tube with J tube extension into the duodenum present in the gastric antrum. Avoided intubating the duodenum as not to disrupt the J tube extension. External fistula site was ablated with APC to foster healing.  - Prior stoma still has persistent leakage, especially when patient is bloated.     Recommendations  - Workup for hypoxia and optimization of respiratory status per primary team; Patient is not optimized for endoscopic interventions at this time.  - If patient continues to have frequent BM's or large volume diarrhea, would obtain infectious workup (GI PCR, C. diff studies) given hx of recurrent c. diff infections.  - May attempt GJ exchange and PEG stoma closure when patient is medically optimized, or Dr. Rosales may perform the procedure on Monday if his schedule permits.   - Rest of chronic GI condition management per Dr. Rosales outpatient    D/w Dr. Nahun Martin  GI/Hepatology Fellow    MONDAY-FRIDAY 8AM-5PM:  Pager# [phone] ([facility]) or [phone] ([facility])    NON-URGENT CONSULTS:  Please email [email or web address] OR [email or web address]  AT NIGHT AND ON WEEKENDS:  Contact on-call GI fellow via AMION by paging or hospital  from 5pm-8am and on weekends/holidays

## 2024-10-08 NOTE — CONSULT NOTE ADULT - SUBJECTIVE AND OBJECTIVE BOX
Wound Surgery Consult Note:    HPI:  73 yo F PMH T2DM on insulin, HTN, HLD, hypothyroidism, RA on Prednisone, Fibromyalgia, cerebral aneurysm s/p repair, chronic hypercapnic respiratory failure s/p trach (vent dependent) and chronic PEG 2022, recurrent C. diff infection (follows with Dr. Newman), persistent GJ tube leaks now s/p GC fistula repair 8/12 BIBEMS with daughter for respiratory distress, hypoxia to high 80s.  Pt also noted to have rectal bleeding this past week requiring transfusion 5 days ago in ED as per daughter.  In ED, pt afebrile, fiO2 96-98% on 40% on ventilator.  Chest Xray w/ opacification of right lung concerning for possible PNA.  Admitted to RCU for further management.  (07 Oct 2024 18:58)    Request for wound care consult for chronic sacral pressure injury and management of LUQ former PEG site with chronic leakage. Ms. Woods was encountered on an alternating air with low air loss surface. Her daughter and spouse were at the bedside. Her daughter is her caregiver and is coordinating her care with her medical team. She follows with Izabela Espino NP at the [facility] wound care center outpatient. As per her daughter, Ms. Woods has chronic gastric content leakage >100cc in 24 hours and now has a PEJ that is patent and through which feedings are infusing. with scant drainage around flange. There is a vent on the PEJ that the daughter says must be kept open to prevent copious leakage from the old PEG site. Minimal skin irritation noted to skin around old PEG site with scant drainage noted at the time of this assessment. Discussed options with daughter who insisted that the old PEG drains >100 cc in 24 hours. Agreeable to trialing pouching old PEG site. I explained that this option is not a daily change and that the pouch would be left in place for 3-5 days if there is no leakage around the pouch. Premier 1 piece drainable Eddie pouch with adapt ring applied. Back up plan will be cleansing with NS, pat dry, apply aquacel, cover with gauze, secure with minimal tape, changing PRN for saturation. I explained that management of the PEJ leakage/vent is up to GI.    Sacral wound with evidence of scar tissue and contraction. One central area is 0.5cm deep with majority of wound superficially denuded. Patient's daughter stated that she has frequent diarrhea and pain when being cleaned. She occasionally has bleeding from hemmorrhoids when cleaned after BM and daughter uses coconut oil ointment to manage this. She has never tried cavilon advanced and her daughter agreed to trial this. Back up plan with be application of coconut ointment that the daughter has at the bedside.  She is incontinent of stool and urine. Her immobility, inactivity, incontinence of bowel and bladder in addition to poor nutritional status all contribute to her risk of pressure injury development and hinder healing.     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 No nausea, vomiting, or hematemesis; + Loose BMs, + hemmorrhoids, PEJ with feeds in progress, Old PEG site with leakage  GENITOURINARY: No dysuria, frequency or hematuria  NEUROLOGICAL: + weakness  SKIN: No itching, rashes  PSYCH: no confusion or altered mental status      MEDICATIONS  (STANDING):  acetaminophen   Oral Liquid .. 500 milliGRAM(s) Enteral Tube every 6 hours  acetylcysteine 10%  Inhalation 4 milliLiter(s) Inhalation every 12 hours  albuterol/ipratropium for Nebulization 3 milliLiter(s) Nebulizer every 6 hours  amLODIPine   Tablet 10 milliGRAM(s) Oral <User Schedule>  artificial  tears Solution 1 Drop(s) Both EYES every 6 hours  ascorbic acid 500 milliGRAM(s) Oral daily  Biotene Dry Mouth Oral Rinse 5 milliLiter(s) Swish and Spit every 6 hours  calcium carbonate 1250 mG  + Vitamin D (OsCal 500 + D) 1 Tablet(s) Oral <User Schedule>  cefepime   IVPB 1000 milliGRAM(s) IV Intermittent every 12 hours  chlorhexidine 0.12% Liquid 15 milliLiter(s) Oral Mucosa every 12 hours  chlorhexidine 4% Liquid 1 Application(s) Topical daily  dextrose 5%. 1000 milliLiter(s) (100 mL/Hr) IV Continuous <Continuous>  dextrose 5%. 1000 milliLiter(s) (50 mL/Hr) IV Continuous <Continuous>  dextrose 50% Injectable 12.5 Gram(s) IV Push once  dextrose 50% Injectable 25 Gram(s) IV Push once  dextrose 50% Injectable 25 Gram(s) IV Push once  escitalopram 10 milliGRAM(s) Oral <User Schedule>  fentaNYL   Patch  25 MICROgram(s)/Hr 1 Patch Transdermal every 72 hours  ferrous    sulfate Liquid 300 milliGRAM(s) Oral daily  gabapentin Solution 250 milliGRAM(s) Oral <User Schedule>  glucagon  Injectable 1 milliGRAM(s) IntraMuscular once  hemorrhoidal Ointment 1 Application(s) Rectal at bedtime  influenza  Vaccine (HIGH DOSE) 0.5 milliLiter(s) IntraMuscular once  insulin lispro (ADMELOG) corrective regimen sliding scale   SubCutaneous every 6 hours  lactobacillus acidophilus 1 Tablet(s) Oral <User Schedule>  levothyroxine 125 MICROGram(s) Oral daily  lidocaine   4% Patch 1 Patch Transdermal every 24 hours  melatonin Liquid 12 milliGRAM(s) Oral <User Schedule>  multivitamin/minerals 1 Tablet(s) Oral daily  pantoprazole  Injectable 40 milliGRAM(s) IV Push every 12 hours  prednisoLONE acetate 1% Suspension 1 Drop(s) Both EYES every 6 hours  predniSONE   Tablet 5 milliGRAM(s) Oral <User Schedule>  QUEtiapine 12.5 milliGRAM(s) Oral <User Schedule>  simethicone 160 milliGRAM(s) Chew <User Schedule>  sodium chloride 1 Gram(s) Oral every 12 hours  sodium chloride 0.65% Nasal 1 Spray(s) Both Nostrils every 6 hours  witch hazel Pads 1 Application(s) Topical every 12 hours    MEDICATIONS  (PRN):  acetaminophen   Oral Liquid .. 500 milliGRAM(s) Oral every 12 hours PRN Moderate Pain (4 - 6)  calcium carbonate    500 mG (Tums) Chewable 1 Tablet(s) Chew two times a day PRN Gas  dextrose Oral Gel 15 Gram(s) Oral once PRN Blood Glucose LESS THAN 70 milliGRAM(s)/deciliter  diphenhydrAMINE Elixir 12.5 milliGRAM(s) Oral every 6 hours PRN Rash and/or Itching  oxyCODONE    IR 2.5 milliGRAM(s) Oral every 6 hours PRN Severe Pain (7 - 10)    Allergies    walnut (Unknown)  metronidazole (Rash)  Lyrica (Unknown)  penicillin (Unknown)  Pineapple (Unknown)  Tagamet (Unknown)  Pecan, Filbert, Hazelnut (Unknown)  heparin (Unknown)  Pecans (Unknown)  Hazelnut (Unknown)  pineapple (Unknown)  meropenem (Rash)    Intolerances    SOCIAL HISTORY:  , Denies smoking, ETOH, drugs    FAMILY HISTORY:  No pertinent family history among first degree relatives    Vital Signs Last 24 Hrs  T(C): 38.2 (08 Oct 2024 11:54), Max: 38.6 (07 Oct 2024 17:24)  T(F): 100.8 (08 Oct 2024 11:54), Max: 101.5 (07 Oct 2024 17:24)  HR: 97 (08 Oct 2024 14:35) (82 - 116)  BP: 93/51 (08 Oct 2024 11:54) (93/51 - 156/100)  BP(mean): 115 (07 Oct 2024 17:24) (115 - 115)  RR: 18 (08 Oct 2024 11:54) (17 - 18)  SpO2: 96% (08 Oct 2024 14:35) (96% - 100%)    Parameters below as of 08 Oct 2024 14:35  Patient On (Oxygen Delivery Method): ventilator    Physical Exam:  General: alert, WN, obese  Ophthamology: sclera clear  ENMT: moist mucous membranes, trachea midline, trach  Respiratory: equal chest rise with respirations  Gastrointestinal: soft NT, slightly distended, PEJ with feeds instilling, no leakage, PEG site with occasional drainage, periPEG site with slight erythema, hemmorrhoids  Neurology: nonverbal, not following commands  Psych: calm  Musculoskeletal: no contractures  Vascular: BLE edema equal  Skin:  Sacral/bilateral buttocks wound with central, small deep ulceration 1cm x 1cm x D 0.5cm, with superficially denuded skin extending , L 6cm x 1.5cm x 0.5cm, small amount of serosanguinous drainage  No odor, increased warmth, tenderness, induration, crepitus, fluctuance    LABS:  10-08    132[L]  |  99  |  18  ----------------------------<  251[H]  3.8   |  23  |  <0.30[L]    Ca    8.1[L]      08 Oct 2024 03:38  Phos  3.4     10-08  Mg     1.8     10-08    TPro  5.7[L]  /  Alb  2.4[L]  /  TBili  0.4  /  DBili  x   /  AST  24  /  ALT  24  /  AlkPhos  75  10-08                          7.1    14.09 )-----------( 113      ( 08 Oct 2024 04:36 )             25.6     PT/INR - ( 07 Oct 2024 12:37 )   PT: 13.2 sec;   INR: 1.15 ratio         PTT - ( 07 Oct 2024 12:37 )  PTT:30.5 sec  Urinalysis Basic - ( 08 Oct 2024 03:38 )    Color: x / Appearance: x / SG: x / pH: x  Gluc: 251 mg/dL / Ketone: x  / Bili: x / Urobili: x   Blood: x / Protein: x / Nitrite: x   Leuk Esterase: x / RBC: x / WBC x   Sq Epi: x / Non Sq Epi: x / Bacteria: x    RADIOLOGY & ADDITIONAL STUDIES:    EXAM:  CT ABDOMEN AND PELVIS IC   ORDERED BY:  MATTHEW AGUIRRE     PROCEDURE DATE:  10/07/2024          INTERPRETATION:  CLINICAL INFORMATION: Discharge from PEG tube    COMPARISON: CT abdomen pelvis 10/2/2024    CONTRAST/COMPLICATIONS:  IV Contrast: Omnipaque 350  90 cc administered   10 cc discarded  Oral Contrast: NONE  Complications: None reported at time of study completion    PROCEDURE:  CT of the Abdomen and Pelvis was performed.  Sagittal and coronal reformats were performed.    FINDINGS:  LOWER CHEST: Bilateral interlobular septal thickening. Bibasilar   subsegmental atelectasis. Coronary artery and aortic calcifications.    LIVER: Hepatic steatosis.  BILE DUCTS: Normal caliber.  GALLBLADDER: Within normal limits.  SPLEEN: Within normal limits.  PANCREAS: Within normal limits.  ADRENALS: Within normal limits.  KIDNEYS/URETERS: No hydronephrosis. Right mid renal cortical scarring.   Bilateral renal hypodensities too small to characterize.    BLADDER: Within normal limits.  REPRODUCTIVE ORGANS: Uterus and adnexa within normal limits.    BOWEL: Gastrojejunostomy tube with tip in the jejunum. Unchanged   gastrocutaneous fistula. No bowel obstruction. Appendix is not visualized.  PERITONEUM/RETROPERITONEUM: Within normal limits.  VESSELS: Within normal limits.  LYMPH NODES: No lymphadenopathy.  ABDOMINAL WALL: No evidence of subcutaneous collection.  BONES: Chronic bilateral anterior and lateral rib fractures. Chronic left   inferior and superior pubic rami fractures. Unchanged vertebral body   height loss of T9 and T11-L5.    IMPRESSION:  Unchanged gastrocutaneous fistula. Gastrojejunostomy tube is intact.  No focal intra-abdominal/pelvic or subcutaneous collections.

## 2024-10-08 NOTE — PROGRESS NOTE ADULT - PROBLEM SELECTOR PLAN 1
- Patient with Chronic Trach at baseline   - Current Vent Settings: Volume AC :  RR: 18 PEEP: 5 FIO2: 40%  - CXR 10/7: Opacification of the right middle lobe may represent pneumonia versus atelectasis  - Received Rocephin and Azithro in the ED   - Sputum cx ordered ( Pending)  - Prior Sputum cx grew CRE PSA  - Will initiate Cefepime   - ID Consult in AM

## 2024-10-08 NOTE — CONSULT NOTE ADULT - ASSESSMENT
Impression:    Sacral/bilateral Buttocks stage 4 pressure injury present on admission  Former PEG site/fistula with leakage - MAD  Hemmorrhoids  Incontinence of bowel and bladder  Incontinence Dermatitis    Recommend:  1.) topical therapy: sacral/buttock wound – cleanse with NS, pat dry, apply cavilon to periwound skin, apply aquacel rope to center wound, cover with an allevyn foam dressing daily  2.) Incontinence Management - incontinence cleanser, pads, pericare BID, apply cavilon advanced daily, Back up plan is for coconut oil ointment which daughter has at the bedside.  3.) Maintain on an alternating air with low air loss surface  4.) Turn and reposition Q 2 hours  5.) Nutrition optimization - please add Alfred  6.) Offload heels/feet with complete cair air fluidized boots/pillows; ensure that the soles of the feet are not resting on the foot board of the bed.  7.) Glycemic control, SS insulin coverage  8.) Gastroenterology for Management of PEJ tube/drainage  9.) LUQ former PEG site pouched to manage drainage, back up plan is for aquacel, cover with gauze, secure with minimal tape PRN for saturation    Care as per medicine. Will follow periodically during her admission  Upon discharge f/u as outpatient at [facility] [address] [facility] [phone]  Thank you for this consult  ERICA BanuelosC, CWOCN via TEAMS    Nights/ Weekends/ Holidays please call:  General Surgery Consult pager ([phone]) for emergencies

## 2024-10-08 NOTE — CONSULT NOTE ADULT - SUBJECTIVE AND OBJECTIVE BOX
CC: hearing loss    HPI: 75 yo F PMH T2DM on insulin, HTN, HLD, hypothyroidism, RA on Prednisone, Fibromyalgia, cerebral aneurysm s/p repair, chronic hypercapnic respiratory failure s/p trach (vent dependent) and chronic PEG 2022, recurrent C. diff infection (follows with Dr. Newman), persistent GJ tube leaks now s/p GC fistula repair 8/12 BIBEMS with daughter for respiratory distress, hypoxia to high 80s.  Pt also noted to have rectal bleeding this past week requiring transfusion 5 days ago in ED as per daughter.  In ED, pt afebrile, fiO2 96-98% on 40% on ventilator.  Chest Xray w/ opacification of right lung concerning for possible PNA.  Admitted to RCU for further management. ENT called for hearing loss. Daughter states that she has had b/l hearing loss L>R for the past 15 years however the right side appears to have suddenly gotten worse 2 weeks ago. Has history of hearing aids however lost them a long time ago. Daughter also states she has had blood coming from the tracheostomy tube during suctioning. Unable to obtain further history from pt due to clinical condition.         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 500 milliGRAM(s) Enteral Tube every 6 hours  acetylcysteine 10%  Inhalation 4 milliLiter(s) Inhalation every 12 hours  albuterol/ipratropium for Nebulization 3 milliLiter(s) Nebulizer every 6 hours  amLODIPine   Tablet 10 milliGRAM(s) Oral <User Schedule>  artificial  tears Solution 1 Drop(s) Both EYES every 6 hours  ascorbic acid 500 milliGRAM(s) Oral daily  Biotene Dry Mouth Oral Rinse 5 milliLiter(s) Swish and Spit every 6 hours  calcium carbonate 1250 mG  + Vitamin D (OsCal 500 + D) 1 Tablet(s) Oral <User Schedule>  cefepime   IVPB 1000 milliGRAM(s) IV Intermittent every 12 hours  chlorhexidine 0.12% Liquid 15 milliLiter(s) Oral Mucosa every 12 hours  chlorhexidine 4% Liquid 1 Application(s) Topical daily  dextrose 5%. 1000 milliLiter(s) (100 mL/Hr) IV Continuous <Continuous>  dextrose 5%. 1000 milliLiter(s) (50 mL/Hr) IV Continuous <Continuous>  dextrose 50% Injectable 25 Gram(s) IV Push once  dextrose 50% Injectable 25 Gram(s) IV Push once  dextrose 50% Injectable 12.5 Gram(s) IV Push once  diclofenac sodium 1% Gel 2 Gram(s) Topical every 6 hours  escitalopram 10 milliGRAM(s) Oral <User Schedule>  fentaNYL   Patch  25 MICROgram(s)/Hr 1 Patch Transdermal every 72 hours  ferrous    sulfate Liquid 300 milliGRAM(s) Oral daily  gabapentin Solution 250 milliGRAM(s) Oral <User Schedule>  glucagon  Injectable 1 milliGRAM(s) IntraMuscular once  hemorrhoidal Ointment 1 Application(s) Rectal at bedtime  influenza  Vaccine (HIGH DOSE) 0.5 milliLiter(s) IntraMuscular once  insulin glargine Injectable (LANTUS) 20 Unit(s) SubCutaneous at bedtime  insulin lispro (ADMELOG) corrective regimen sliding scale   SubCutaneous every 6 hours  insulin regular  human recombinant 14 Unit(s) SubCutaneous <User Schedule>  lactobacillus acidophilus 1 Tablet(s) Oral <User Schedule>  levothyroxine 125 MICROGram(s) Oral daily  lidocaine   4% Patch 1 Patch Transdermal every 24 hours  melatonin Liquid 12 milliGRAM(s) Oral <User Schedule>  multivitamin/minerals 1 Tablet(s) Oral daily  pantoprazole  Injectable 40 milliGRAM(s) IV Push every 12 hours  prednisoLONE acetate 1% Suspension 1 Drop(s) Both EYES every 6 hours  predniSONE   Tablet 5 milliGRAM(s) Oral <User Schedule>  QUEtiapine 12.5 milliGRAM(s) Oral <User Schedule>  simethicone 160 milliGRAM(s) Chew <User Schedule>  sodium chloride 1 Gram(s) Oral every 12 hours  sodium chloride 0.65% Nasal 1 Spray(s) Both Nostrils every 6 hours  witch hazel Pads 1 Application(s) Topical every 12 hours    MEDICATIONS  (PRN):  acetaminophen   Oral Liquid .. 500 milliGRAM(s) Oral every 12 hours PRN Moderate Pain (4 - 6)  calcium carbonate    500 mG (Tums) Chewable 1 Tablet(s) Chew two times a day PRN Gas  diphenhydrAMINE Elixir 12.5 milliGRAM(s) Oral every 6 hours PRN Rash and/or Itching  oxyCODONE    IR 2.5 milliGRAM(s) Oral every 6 hours PRN Severe Pain (7 - 10)      Social History: no pertinent social history     Family history: no pertinent family history     ROS:   unable to obtain due to pts clinical condition     Vital Signs Last 24 Hrs  T(C): 37 (08 Oct 2024 13:30), Max: 38.6 (07 Oct 2024 17:24)  T(F): 98.6 (08 Oct 2024 13:30), Max: 101.5 (07 Oct 2024 17:24)  HR: 93 (08 Oct 2024 15:26) (82 - 116)  BP: 93/51 (08 Oct 2024 11:54) (93/51 - 156/100)  BP(mean): 115 (07 Oct 2024 17:24) (115 - 115)  RR: 18 (08 Oct 2024 11:54) (17 - 18)  SpO2: 95% (08 Oct 2024 15:26) (95% - 100%)    Parameters below as of 08 Oct 2024 14:35  Patient On (Oxygen Delivery Method): ventilator                              7.1    14.09 )-----------( 113      ( 08 Oct 2024 04:36 )             25.6    10-08    132[L]  |  99  |  18  ----------------------------<  251[H]  3.8   |  23  |  <0.30[L]    Ca    8.1[L]      08 Oct 2024 03:38  Phos  3.4     10-08  Mg     1.8     10-08    TPro  5.7[L]  /  Alb  2.4[L]  /  TBili  0.4  /  DBili  x   /  AST  24  /  ALT  24  /  AlkPhos  75  10-08   PT/INR - ( 07 Oct 2024 12:37 )   PT: 13.2 sec;   INR: 1.15 ratio         PTT - ( 07 Oct 2024 12:37 )  PTT:30.5 sec    PHYSICAL EXAM:  Gen: NAD  Skin: No rashes, bruises, or lesions  Head: Normocephalic, Atraumatic  Face: no edema, erythema, or fluctuance. Parotid glands soft without mass  Eyes: no scleral injection  Ears: Right - ear canal clear, TM intact without effusion or erythema. No evidence of any fluid drainage. No mastoid tenderness, erythema, or ear bulging            Left - ear canal clear, TM intact without effusion or erythema. No evidence of any fluid drainage. No mastoid tenderness, erythema, or ear bulging  Nose: Nares bilaterally patent, no discharge  Mouth: No Stridor / Drooling / Trismus.  Mucosa moist, tongue/uvula midline, oropharynx clear  Neck: Cuffed #8 distal XLT in place. Flat, supple, no lymphadenopathy, trachea midline, no masses  Lymphatic: No lymphadenopathy  Resp: on vent   CV: no peripheral edema/cyanosis  GI: nondistended   Peripheral vascular: no JVD or edema  Neuro: unable to eval due to pts clinical condition       Tracheoscopy   Bedside tracheoscopy performed, nick visualized, no purulence, no erythema, no granulation tissue or bleeding. Pt tolerated the procedure well without complications.

## 2024-10-08 NOTE — PROGRESS NOTE ADULT - PROBLEM SELECTOR PLAN 8
- Patient has known hx of prior G-Tube stoma leak   - S/p EGD for closure of Gastric Fistula (8/12) w/ Total of 3 Mantis clips and two 22 mm Microtech clips by GI Dr Rosales   - Old stoma peg site with mild clear drainage s/p repair of fistula  - As per daughter feeds and meds all administered via j-port and g-tube remains to continuos drainage

## 2024-10-08 NOTE — CONSULT NOTE ADULT - ASSESSMENT
73 yo F PMH T2DM on insulin, HTN, HLD, hypothyroidism, RA on Prednisone, Fibromyalgia, cerebral aneurysm s/p repair, chronic hypercapnic respiratory failure s/p trach (vent dependent) and chronic PEG 2022, recurrent C. diff infection (follows with Dr. Newman), persistent GJ tube leaks now s/p GC fistula repair 8/12 BIBEMS with daughter for respiratory distress, hypoxia to high 80s. ENT called for hearing loss. Daughter states that she has had b/l hearing loss L>R for the past 15 years however the right side appears to have suddenly gotten worse 2 weeks ago. Has history of hearing aids however lost them a long time ago. Daughter also states she has had blood coming from the tracheostomy tube during suctioning. On exam, B/L ears appear normal. Tracheoscopy performed which shows no granulation tissue or active bleeding.

## 2024-10-08 NOTE — PROGRESS NOTE ADULT - PROBLEM SELECTOR PLAN 2
- Patient with Hx of external Hemmorrhoids  - Bleeding Hemmorrhoids noted on exam   - G-J Tube flushed and lavaged no evidence of active bleeding   - CT ABD / Pelvis 10/7: Unchanged gastrocutaneous fistula. Gastrojejunostomy tube is intact.  No focal intra-abdominal/pelvic or subcutaneous collections  - Occult 10/7: Positive  - Monitor CBC / + Active Type and screen

## 2024-10-09 NOTE — DIETITIAN INITIAL EVALUATION ADULT - PROBLEM SELECTOR PLAN 4
- Patient on Lantus and Humalog at home   - Will give 1/2 dose of Lantus this evening but hold Humalog tonight as patient was not fed during the day in the ED   - Will place patient on ISS this evening

## 2024-10-09 NOTE — DIETITIAN INITIAL EVALUATION ADULT - OTHER INFO
-PMHx of DM2. insulin regimen noted in outpatient medications. Blood glucose being monitored and hyperglycemia noted. Hgb A1c 6.4% indicating pre-DM level.   -s/p trach (vent dependent) and chronic PEG 2022  -S/P GJ placement  -hyponatremic yesterday

## 2024-10-09 NOTE — PROGRESS NOTE ADULT - ASSESSMENT
75 yo F PMH T2DM on insulin, HTN, HLD, hypothyroidism, RA on Prednisone, Fibromyalgia, cerebral aneurysm s/p repair, chronic hypercapnic respiratory failure s/p trach (vent dependent) and chronic PEG 2022, recurrent C. diff infection , persistent GJ tube leaks now s/p GC fistula repair 8/12  admitted 10/7/24 with resp distress and found to have RML pneumonia    Antibiotics  Ceftriaxone 10/7  Azithro 10/7  Cefepime 10/7-->    Suggest  continue Cefepime - 5 day course through last dose 10/11 anticipated  monitor for diarrhea  - will review vaccination status: Prevnar 20, Covid, Flu, RSV  follow CXR to assure resolution   continue chest PT. Resp therapy for ?RML syndrome     tentative EGD for GJ exchange and piror PEG site closure on 10/10.

## 2024-10-09 NOTE — DIETITIAN INITIAL EVALUATION ADULT - ORAL INTAKE PTA/DIET HISTORY
Pt well known to this RD. Pt home aid at bedside, sleeping and inappropriate to waken at this time.  Pt well known to this RD. Pt home aid at bedside, sleeping and inappropriate to waken at this time. pecan, hazelnut, pineapple, walnut allergy per electronic medical record.    4/29/24 Chart Note-Nutrition Services Registered Dietitian ordered for Casie Frazier Peptide 1.5 55ml/hr x 18 hours.

## 2024-10-09 NOTE — DIETITIAN INITIAL EVALUATION ADULT - REASON INDICATOR FOR ASSESSMENT
consulted for stage 2 or >. information obtained from RN, electronic medical record, previous dietitian notes.

## 2024-10-09 NOTE — PROGRESS NOTE ADULT - ASSESSMENT
75 yo F PMH T2DM on insulin, HTN, HLD, hypothyroidism, RA on Prednisone, Fibromyalgia, cerebral aneurysm s/p repair, chronic hypercapnic respiratory failure s/p trach (vent dependent) and chronic PEG 2022, recurrent C. diff infection (follows with Dr. 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  Pt also noted to have rectal bleeding this past week requiring transfusion 5 days ago in ED as per daughter. Daughter also reports brownish discharge from G-J tube. In ED, pt afebrile, fiO2 96-98% on 40% on ventilator.  Chest X-ray w/ opacification of right lung concerning for possible PNA.  Admitted to RCU for further management.     10/8: Continue Cefepime for 5 days for PNA, trach evaluated and in good position.  ENT examined ears and no cerumen.  GI to possibly change PEJ when fever curve lessens on Wednesday or Thursday.  Regular insulin re-started 3x/day. and QHS Lantus. 75 yo F PMH T2DM on insulin, HTN, HLD, hypothyroidism, RA on Prednisone, Fibromyalgia, cerebral aneurysm s/p repair, chronic hypercapnic respiratory failure s/p trach (vent dependent) and chronic PEG 2022, recurrent C. diff infection (follows with Dr. 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  Pt also noted to have rectal bleeding this past week requiring transfusion 5 days ago in ED as per daughter. Daughter also reports brownish discharge from G-J tube. In ED, pt afebrile, fiO2 96-98% on 40% on ventilator.  Chest X-ray w/ opacification of right lung concerning for possible PNA.  Admitted to RCU for further management.     10/9: Continue Cefepime for 5 days for PNA,10/12) trach evaluated and in good position.  ENT examined ears and no cerumen. Audiology to see patient.  GI to possibly on Wednesday.  Regular insulin re-started 3x/day. and QHS Lantus.

## 2024-10-09 NOTE — DIETITIAN INITIAL EVALUATION ADULT - NSFNSGIIOFT_GEN_A_CORE
10-08-24 @ 07:01  -  10-09-24 @ 07:00  --------------------------------------------------------  OUT:  Total OUT: 0 mL    Total NET: 700 mL       10/8 Consult Note Adult-Gastroenterology Fellow/Attending: "Per daughter, patient's prior PEG site stoma is still leaking liquids and gas when the venting G tube was not working well. Patient has also been very bloated, gassy with nausea lately, but no vomiting. Patient has loose, pasty stools at baseline. Currently no bleeding."  -->of note, pt with history of recurrent C. diff infections.

## 2024-10-09 NOTE — DIETITIAN INITIAL EVALUATION ADULT - NSFNSNUTRHOMESUPPLEMENTFT_GEN_A_CORE
per outpatient medications: lactobacillus acidophilus, ascorbic acid, Multiple Vitamins with Minerals oral liquid

## 2024-10-09 NOTE — PROGRESS NOTE ADULT - PROBLEM SELECTOR PLAN 4
- Patient on Lantus and Humalog at home   - Will give 1/2 dose of Lantus this evening but hold Humalog tonight as patient was not fed during the day in the ED   - Regular insulin re-started 3x/day

## 2024-10-09 NOTE — PROGRESS NOTE ADULT - NS ATTEND AMEND GEN_ALL_CORE FT
73 yo F h/o DM2 on insulin, HTN, HLD, hypothyroidism, RA on Prednisone, Fibromyalgia, cerebral aneurysm s/p repair, chronic hypoxemia and hypercapnic respiratory failure, tracheostomy and vent dependent, recurrent C. 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  Chest Xray with opacification in the right mid lung field concerning for possible pneumonia.  Admitted to the RCU for further management.     1. Neuro - awake and alert, orientated, mouthing words. Daughter concerned about hearing loss and impacted cerumen - appreciate ENT and audiology eval  h/o anxiety - c/w low dose Seroquel and Lexapro  2. Pulm - trach and vent dependent, daily PS trials, trach care. Blood tinged secretions noted - minimize deep suctioning and use soft tipped catheters. Bronchodilators and Sputum cultures ngtd  3. CVS-HD stable, c/w Norvasc  4. GI - GC fistula s/p clipping 8/12 - pending GJ tube replacement and GC clipping vs repair on 10/10. Stoma with chronic leak, positional. No further rectal bleeding. Monitor h/h. Tube feeds, nutrition consult appreciated  5. ID - RML pneumonia - c/w Cefepime 5 day course, monitor for diarrhea, low threshold to send infectious work up. Appreciate ID eval and recs. All cultures NGTD  6. Heme - anemia, intermittent BRBPR from known hemorrhoids, stable H/H. DVT ppx - SCDs  7. Endo - ISS, monitor FS  8. Rheum - c/w prednisone and chronic pain management  9. Derm - wound care consult for sacrum and gastric stoma wound  above discussed with the patient's daughter and , all questions answered.

## 2024-10-09 NOTE — DIETITIAN INITIAL EVALUATION ADULT - NS FNS DIET ORDER
Diet, NPO with Tube Feed:   Tube Feeding Modality: Jejunostomy  Casie eCaring Peptide 1.5 ([id number])  Total Volume for 24 Hours (mL): 900  Continuous  Starting Tube Feed Rate {mL per Hour}: 55  Until Goal Tube Feed Rate (mL per Hour): 50  Tube Feed Duration (in Hours): 18  Tube Feed Start Time: 20:00  Free Water Flush  Pump   Rate (mL per Hour): 20   Frequency: Every Hour  Alfred(7 Gm Arginine/7 Gm Glut/1.2 Gm HMB     Qty per Day:  2  Prosource Gelatein 20 Sugar Free     Qty per Day:  2  Banatrol TF     Qty per Day:  1/2 packet 2 x daily (10-07-24 @ 20:37) [Active]

## 2024-10-09 NOTE — DIETITIAN INITIAL EVALUATION ADULT - ADD RECOMMEND
1) Will continue to monitor weight, labs, skin, GI status and diet 2) continue vitamin/mineral regimen as ordered to aid in wound healing

## 2024-10-09 NOTE — DIETITIAN INITIAL EVALUATION ADULT - PERTINENT MEDS FT
MEDICATIONS  (STANDING):  acetaminophen   Oral Liquid .. 500 milliGRAM(s) Enteral Tube every 6 hours  acetylcysteine 10%  Inhalation 4 milliLiter(s) Inhalation every 12 hours  albuterol/ipratropium for Nebulization 3 milliLiter(s) Nebulizer every 6 hours  amLODIPine   Tablet 10 milliGRAM(s) Oral <User Schedule>  artificial  tears Solution 1 Drop(s) Both EYES every 6 hours  ascorbic acid 500 milliGRAM(s) Oral daily  Biotene Dry Mouth Oral Rinse 5 milliLiter(s) Swish and Spit every 6 hours  calcium carbonate 1250 mG  + Vitamin D (OsCal 500 + D) 1 Tablet(s) Oral <User Schedule>  cefepime   IVPB 1000 milliGRAM(s) IV Intermittent every 12 hours  chlorhexidine 0.12% Liquid 15 milliLiter(s) Oral Mucosa every 12 hours  chlorhexidine 4% Liquid 1 Application(s) Topical daily  dextrose 5%. 1000 milliLiter(s) (100 mL/Hr) IV Continuous <Continuous>  dextrose 5%. 1000 milliLiter(s) (50 mL/Hr) IV Continuous <Continuous>  dextrose 50% Injectable 12.5 Gram(s) IV Push once  dextrose 50% Injectable 25 Gram(s) IV Push once  dextrose 50% Injectable 25 Gram(s) IV Push once  diclofenac sodium 1% Gel 2 Gram(s) Topical every 6 hours  escitalopram 10 milliGRAM(s) Oral <User Schedule>  fentaNYL   Patch  25 MICROgram(s)/Hr 1 Patch Transdermal every 72 hours  ferrous    sulfate Liquid 300 milliGRAM(s) Oral daily  gabapentin Solution 250 milliGRAM(s) Oral <User Schedule>  glucagon  Injectable 1 milliGRAM(s) IntraMuscular once  hemorrhoidal Ointment 1 Application(s) Rectal at bedtime  influenza  Vaccine (HIGH DOSE) 0.5 milliLiter(s) IntraMuscular once  insulin glargine Injectable (LANTUS) 20 Unit(s) SubCutaneous at bedtime  insulin regular  human recombinant 14 Unit(s) SubCutaneous <User Schedule>  lactobacillus acidophilus 1 Tablet(s) Oral <User Schedule>  levothyroxine 125 MICROGram(s) Oral daily  lidocaine   4% Patch 1 Patch Transdermal every 24 hours  melatonin Liquid 12 milliGRAM(s) Oral <User Schedule>  multivitamin/minerals 1 Tablet(s) Oral daily  pantoprazole  Injectable 40 milliGRAM(s) IV Push every 12 hours  prednisoLONE acetate 1% Suspension 1 Drop(s) Both EYES every 6 hours  predniSONE   Tablet 5 milliGRAM(s) Oral <User Schedule>  QUEtiapine 12.5 milliGRAM(s) Oral <User Schedule>  simethicone 160 milliGRAM(s) Chew <User Schedule>  sodium chloride 1 Gram(s) Oral every 12 hours  sodium chloride 0.65% Nasal 1 Spray(s) Both Nostrils every 6 hours  witch hazel Pads 1 Application(s) Topical every 12 hours    MEDICATIONS  (PRN):  acetaminophen   Oral Liquid .. 500 milliGRAM(s) Oral every 12 hours PRN Moderate Pain (4 - 6)  calcium carbonate    500 mG (Tums) Chewable 1 Tablet(s) Chew two times a day PRN Gas  diphenhydrAMINE Elixir 12.5 milliGRAM(s) Oral every 6 hours PRN Rash and/or Itching  oxyCODONE    IR 2.5 milliGRAM(s) Oral every 6 hours PRN Severe Pain (7 - 10)   MEDICATIONS  (STANDING):  acetaminophen   Oral Liquid .. 500 milliGRAM(s) Enteral Tube every 6 hours  acetylcysteine 10%  Inhalation 4 milliLiter(s) Inhalation every 12 hours  albuterol/ipratropium for Nebulization 3 milliLiter(s) Nebulizer every 6 hours  amLODIPine   Tablet 10 milliGRAM(s) Oral <User Schedule>  artificial  tears Solution 1 Drop(s) Both EYES every 6 hours  ascorbic acid 500 milliGRAM(s) Oral daily  Biotene Dry Mouth Oral Rinse 5 milliLiter(s) Swish and Spit every 6 hours  calcium carbonate 1250 mG  + Vitamin D (OsCal 500 + D) 1 Tablet(s) Oral <User Schedule>  cefepime   IVPB 1000 milliGRAM(s) IV Intermittent every 12 hours  chlorhexidine 0.12% Liquid 15 milliLiter(s) Oral Mucosa every 12 hours  chlorhexidine 4% Liquid 1 Application(s) Topical daily  dextrose 5%. 1000 milliLiter(s) (100 mL/Hr) IV Continuous <Continuous>  dextrose 5%. 1000 milliLiter(s) (50 mL/Hr) IV Continuous <Continuous>  dextrose 50% Injectable 12.5 Gram(s) IV Push once  dextrose 50% Injectable 25 Gram(s) IV Push once  dextrose 50% Injectable 25 Gram(s) IV Push once  diclofenac sodium 1% Gel 2 Gram(s) Topical every 6 hours  escitalopram 10 milliGRAM(s) Oral <User Schedule>  fentaNYL   Patch  25 MICROgram(s)/Hr 1 Patch Transdermal every 72 hours  ferrous    sulfate Liquid 300 milliGRAM(s) Oral daily  gabapentin Solution 250 milliGRAM(s) Oral <User Schedule>  glucagon  Injectable 1 milliGRAM(s) IntraMuscular once  hemorrhoidal Ointment 1 Application(s) Rectal at bedtime  influenza  Vaccine (HIGH DOSE) 0.5 milliLiter(s) IntraMuscular once  insulin glargine Injectable (LANTUS) 20 Unit(s) SubCutaneous at bedtime  insulin regular  human recombinant 14 Unit(s) SubCutaneous <User Schedule>  lactobacillus acidophilus 1 Tablet(s) Oral <User Schedule>  levothyroxine 125 MICROGram(s) Oral daily  lidocaine   4% Patch 1 Patch Transdermal every 24 hours  melatonin Liquid 12 milliGRAM(s) Oral <User Schedule>  multivitamin/minerals 1 Tablet(s) Oral daily  pantoprazole  Injectable 40 milliGRAM(s) IV Push every 12 hours  prednisoLONE acetate 1% Suspension 1 Drop(s) Both EYES every 6 hours  predniSONE   Tablet 5 milliGRAM(s) Oral <User Schedule>  QUEtiapine 12.5 milliGRAM(s) Oral <User Schedule>  simethicone 160 milliGRAM(s) Chew <User Schedule>  sodium chloride 1 Gram(s) Oral every 12 hours  sodium chloride 0.65% Nasal 1 Spray(s) Both Nostrils every 6 hours  witch hazel Pads 1 Application(s) Topical every 12 hours    MEDICATIONS  (PRN):  acetaminophen   Oral Liquid .. 500 milliGRAM(s) Oral every 12 hours PRN Moderate Pain (4 - 6)  calcium carbonate    500 mG (Tums) Chewable 1 Tablet(s) Chew two times a day PRN Gas  diphenhydrAMINE Elixir 12.5 milliGRAM(s) Oral every 6 hours PRN Rash and/or Itching  oxyCODONE    IR 2.5 milliGRAM(s) Oral every 6 hours PRN Severe Pain (7 - 10)

## 2024-10-09 NOTE — CHART NOTE - NSCHARTNOTEFT_GEN_A_CORE
Patient was optimized from infection and pulmonary standpoint per RCU team.     May keep NPO after midnight for tentative EGD for GJ exchange and piror PEG site closure on 10/10.

## 2024-10-09 NOTE — DIETITIAN INITIAL EVALUATION ADULT - NSFNSPHYEXAMSKINFT_GEN_A_CORE
Pressure Injury 1: Bilateral:, buttocks, sacrum, Stage IV  Pressure Injury 2: none, none  Pressure Injury 3: none, none  Pressure Injury 4: none, none  Pressure Injury 5: none, none  Pressure Injury 6: none, none  Pressure Injury 7: none, none  Pressure Injury 8: none, none  Pressure Injury 9: none, none  Pressure Injury 10: none, none  Pressure Injury 11: none, none, Pressure Injury 1: Bilateral:, buttocks, and sacrum, Stage IV  Pressure Injury 2: none, none  Pressure Injury 3: none, none  Pressure Injury 4: none, none  Pressure Injury 5: none, none  Pressure Injury 6: none, none  Pressure Injury 7: none, none  Pressure Injury 8: none, none  Pressure Injury 9: none, none  Pressure Injury 10: none, none  Pressure Injury 11: none, none

## 2024-10-09 NOTE — PROGRESS NOTE ADULT - SUBJECTIVE AND OBJECTIVE BOX
Follow Up:  rml opacity    Interval History/ROS: resting comfortably    Allergies  walnut (Unknown)  metronidazole (Rash)  Lyrica (Unknown)  penicillin (Unknown)  Pineapple (Unknown)  Tagamet (Unknown)  Pecan, Filbert, Hazelnut (Unknown)  heparin (Unknown)  Pecans (Unknown)  Hazelnut (Unknown)  pineapple (Unknown)  meropenem (Rash)    ANTIMICROBIALS:  cefepime   IVPB 1000 every 12 hours      OTHER MEDS:  MEDICATIONS  (STANDING):  acetaminophen   Oral Liquid .. 500 every 6 hours  acetaminophen   Oral Liquid .. 500 every 12 hours PRN  acetylcysteine 10%  Inhalation 4 every 12 hours  albuterol/ipratropium for Nebulization 3 every 6 hours  amLODIPine   Tablet 10 <User Schedule>  calcium carbonate    500 mG (Tums) Chewable 1 two times a day PRN  dextrose 50% Injectable 12.5 once  dextrose 50% Injectable 25 once  dextrose 50% Injectable 25 once  diphenhydrAMINE Elixir 12.5 every 6 hours PRN  escitalopram 10 <User Schedule>  fentaNYL   Patch  25 MICROgram(s)/Hr 1 every 72 hours  gabapentin Solution 250 <User Schedule>  glucagon  Injectable 1 once  influenza  Vaccine (HIGH DOSE) 0.5 once  insulin glargine Injectable (LANTUS) 20 at bedtime  insulin regular  human recombinant 14 <User Schedule>  levothyroxine 125 daily  melatonin Liquid 12 <User Schedule>  oxyCODONE    IR 2.5 every 6 hours PRN  pantoprazole  Injectable 40 every 12 hours  predniSONE   Tablet 5 <User Schedule>  QUEtiapine 12.5 <User Schedule>  simethicone 160 <User Schedule>      Vital Signs Last 24 Hrs  T(C): 37.4 (09 Oct 2024 11:50), Max: 37.4 (09 Oct 2024 11:50)  T(F): 99.3 (09 Oct 2024 11:50), Max: 99.3 (09 Oct 2024 11:50)  HR: 87 (09 Oct 2024 17:50) (84 - 103)  BP: 107/52 (09 Oct 2024 11:50) (100/47 - 131/59)  BP(mean): --  RR: 18 (09 Oct 2024 11:50) (18 - 18)  SpO2: 100% (09 Oct 2024 17:50) (97% - 100%)    Parameters below as of 09 Oct 2024 17:50  Patient On (Oxygen Delivery Method): ventilator        PHYSICAL EXAM:  General:  NAD, Non-toxic  Neurology: Asleep, nonfocal  Respiratory: Crackles right ant chest  CV: RRR, S1S2, no murmurs, rubs or gallops  Abdominal: Soft, Non-tender, non-distended, PEG feeds in progress  Extremities: No edema,   Line Sites: Clear  Skin: No rash                          7.5    8.43  )-----------( 122      ( 09 Oct 2024 07:11 )             27.5       10-09    134[L]  |  98  |  25[H]  ----------------------------<  223[H]  4.1   |  24  |  <0.30[L]    Ca    8.4      09 Oct 2024 07:10  Phos  3.0     10-09  Mg     2.3     10-09    TPro  6.5  /  Alb  2.5[L]  /  TBili  0.4  /  DBili  x   /  AST  28  /  ALT  26  /  AlkPhos  84  10-09        MICROBIOLOGY:  Trach Asp Tracheal Aspirate  10-08-24 --  --    Moderate polymorphonuclear leukocytes per low power field  Rare Squamous epithelial cells per low power field  Rare Gram Variable Rods per oil power field      Clean Catch Clean Catch (Midstream)  10-07-24   10,000 - 49,000 CFU/mL Escherichia coli  --  --        .Blood BLOOD  10-07-24   No growth at 48 Hours  --  --      .Blood BLOOD  10-07-24   No growth at 48 Hours  --  --      Clean Catch Clean Catch (Midstream)  10-03-24   Culture grew 3 or more types of organisms which indicate  collection contamination; consider recollection only if clinically  indicated.  --      RADIOLOGY:  < from: CT Abdomen and Pelvis w/ IV Cont (10.07.24 @ 14:21) >  FINDINGS:  LOWER CHEST: Bilateral interlobular septal thickening. Bibasilar   subsegmental atelectasis. Coronary artery and aortic calcifications.    LIVER: Hepatic steatosis.  BILE DUCTS: Normal caliber.  GALLBLADDER: Within normal limits.  SPLEEN: Within normal limits.  PANCREAS: Within normal limits.  ADRENALS: Within normal limits.  KIDNEYS/URETERS: No hydronephrosis. Right mid renal cortical scarring.   Bilateral renal hypodensities too small to characterize.    BLADDER: Within normal limits.  REPRODUCTIVE ORGANS: Uterus and adnexa within normal limits.    BOWEL: Gastrojejunostomy tube with tip in the jejunum. Unchanged   gastrocutaneous fistula. No bowel obstruction. Appendix is not visualized.  PERITONEUM/RETROPERITONEUM: Within normal limits.  VESSELS: Within normal limits.  LYMPH NODES: No lymphadenopathy.  ABDOMINAL WALL: No evidence of subcutaneous collection.  BONES: Chronic bilateral anterior and lateral rib fractures. Chronic left   inferior and superior pubic rami fractures. Unchanged vertebral body   height loss of T9 and T11-L5.    IMPRESSION:  Unchanged gastrocutaneous fistula. Gastrojejunostomy tube is intact.  No focal intra-abdominal/pelvic or subcutaneous collections.    < end of copied text >      Zion Newman MD; Division of Infectious Disease; Pager: [phone]; nights and weekends: [phone]

## 2024-10-09 NOTE — DIETITIAN INITIAL EVALUATION ADULT - PERTINENT LABORATORY DATA
Fluid overload 10-08    132[L]  |  99  |  18  ----------------------------<  251[H]  3.8   |  23  |  <0.30[L]    Ca    8.1[L]      08 Oct 2024 03:38  Phos  3.4     10-08  Mg     1.8     10-08    TPro  5.7[L]  /  Alb  2.4[L]  /  TBili  0.4  /  DBili  x   /  AST  24  /  ALT  24  /  AlkPhos  75  10-08  POCT Blood Glucose.: 235 mg/dL (10-09-24 @ 05:36)  A1C with Estimated Average Glucose Result: 6.4 % (08-12-24 @ 07:32)  A1C with Estimated Average Glucose Result: 6.2 % (04-04-24 @ 07:40)  A1C with Estimated Average Glucose Result: 7.5 % (10-28-23 @ 07:18)   10-08    132[L]  |  99  |  18  ----------------------------<  251[H]  3.8   |  23  |  <0.30[L]    Ca    8.1[L]      08 Oct 2024 03:38  Phos  3.4     10-08  Mg     1.8     10-08    TPro  5.7[L]  /  Alb  2.4[L]  /  TBili  0.4  /  DBili  x   /  AST  24  /  ALT  24  /  AlkPhos  75  10-08  POCT Blood Glucose.: 235 mg/dL (10-09-24 @ 05:36)  A1C with Estimated Average Glucose Result: 6.4 % (08-12-24 @ 07:32)  A1C with Estimated Average Glucose Result: 6.2 % (04-04-24 @ 07:40)  A1C with Estimated Average Glucose Result: 7.5 % (10-28-23 @ 07:18)

## 2024-10-09 NOTE — DIETITIAN INITIAL EVALUATION ADULT - REASON FOR ADMISSION
per chart: "73 yo F PMH T2DM on insulin, HTN, HLD, hypothyroidism, RA on Prednisone, Fibromyalgia, cerebral aneurysm s/p repair, chronic hypercapnic respiratory failure s/p trach (vent dependent) and chronic PEG 2022, recurrent C. diff infection (follows with Dr. Newman), persistent GJ tube leaks now s/p GC fistula repair 8/12 BIBEMS with daughter for respiratory distress"

## 2024-10-09 NOTE — PROGRESS NOTE ADULT - SUBJECTIVE AND OBJECTIVE BOX
Patient is a 74y old  Female who presents with a chief complaint of per chart: "73 yo F PMH T2DM on insulin, HTN, HLD, hypothyroidism, RA on Prednisone, Fibromyalgia, cerebral aneurysm s/p repair, chronic hypercapnic respiratory failure s/p trach (vent dependent) and chronic PEG 2022, recurrent C. diff infection (follows with Dr. Newman), persistent GJ tube leaks now s/p GC fistula repair 8/12 BIBEMS with daughter for respiratory distress" (09 Oct 2024 07:07)      Interval Events:    REVIEW OF SYSTEMS:  [ ] Positive  [ ] All other systems negative  [ ] Unable to assess ROS because ________    Vital Signs Last 24 Hrs  T(C): 36.4 (10-09-24 @ 05:00), Max: 38.2 (10-08-24 @ 11:54)  T(F): 97.5 (10-09-24 @ 05:00), Max: 100.8 (10-08-24 @ 11:54)  HR: 103 (10-09-24 @ 05:59) (84 - 105)  BP: 112/57 (10-09-24 @ 05:00) (93/51 - 131/59)  RR: 18 (10-09-24 @ 05:00) (17 - 18)  SpO2: 100% (10-09-24 @ 05:59) (95% - 100%)    PHYSICAL EXAM:  HEENT:   [ ]Tracheostomy:  [ ]Pupils equal  [ ]No oral lesions  [ ]Abnormal    SKIN  [ ] No Rash  [ ] Abnormal  [ ] pressure    CARDIAC  [ ]Regular  [ ]Abnormal    PULMONARY  [ ]Bilateral Clear Breath Sounds  [ ]Normal Excursion  [ ]Abnormal    GI  [ ]PEG      [ ] +BS		              [ ]Soft, nondistended, nontender	  [ ]Abnormal    MUSCULOSKELETAL                                   [ ]Bedbound                 [ ]Abnormal    [ ]Ambulatory/OOB to chair                           EXTREMITIES                                         [ ]Normal  [ ]Edema                           NEUROLOGIC  [ ] Normal, non focal  [ ] Focal findings:    PSYCHIATRIC  [ ]Alert and appropriate  [ ] Sedated	 [ ]Agitated    :  Mcbride: [ ] Yes, if yes: Date of Placement:                   [  ] No    LINES: Central Lines [ ] Yes, if yes: Date of Placement                                     [  ] No    HOSPITAL MEDICATIONS:  MEDICATIONS  (STANDING):  acetaminophen   Oral Liquid .. 500 milliGRAM(s) Enteral Tube every 6 hours  acetylcysteine 10%  Inhalation 4 milliLiter(s) Inhalation every 12 hours  albuterol/ipratropium for Nebulization 3 milliLiter(s) Nebulizer every 6 hours  amLODIPine   Tablet 10 milliGRAM(s) Oral <User Schedule>  artificial  tears Solution 1 Drop(s) Both EYES every 6 hours  ascorbic acid 500 milliGRAM(s) Oral daily  Biotene Dry Mouth Oral Rinse 5 milliLiter(s) Swish and Spit every 6 hours  calcium carbonate 1250 mG  + Vitamin D (OsCal 500 + D) 1 Tablet(s) Oral <User Schedule>  cefepime   IVPB 1000 milliGRAM(s) IV Intermittent every 12 hours  chlorhexidine 0.12% Liquid 15 milliLiter(s) Oral Mucosa every 12 hours  chlorhexidine 4% Liquid 1 Application(s) Topical daily  dextrose 5%. 1000 milliLiter(s) (100 mL/Hr) IV Continuous <Continuous>  dextrose 5%. 1000 milliLiter(s) (50 mL/Hr) IV Continuous <Continuous>  dextrose 50% Injectable 12.5 Gram(s) IV Push once  dextrose 50% Injectable 25 Gram(s) IV Push once  dextrose 50% Injectable 25 Gram(s) IV Push once  diclofenac sodium 1% Gel 2 Gram(s) Topical every 6 hours  escitalopram 10 milliGRAM(s) Oral <User Schedule>  fentaNYL   Patch  25 MICROgram(s)/Hr 1 Patch Transdermal every 72 hours  ferrous    sulfate Liquid 300 milliGRAM(s) Oral daily  gabapentin Solution 250 milliGRAM(s) Oral <User Schedule>  glucagon  Injectable 1 milliGRAM(s) IntraMuscular once  hemorrhoidal Ointment 1 Application(s) Rectal at bedtime  influenza  Vaccine (HIGH DOSE) 0.5 milliLiter(s) IntraMuscular once  insulin glargine Injectable (LANTUS) 20 Unit(s) SubCutaneous at bedtime  insulin regular  human recombinant 14 Unit(s) SubCutaneous <User Schedule>  lactobacillus acidophilus 1 Tablet(s) Oral <User Schedule>  levothyroxine 125 MICROGram(s) Oral daily  lidocaine   4% Patch 1 Patch Transdermal every 24 hours  melatonin Liquid 12 milliGRAM(s) Oral <User Schedule>  multivitamin/minerals 1 Tablet(s) Oral daily  pantoprazole  Injectable 40 milliGRAM(s) IV Push every 12 hours  prednisoLONE acetate 1% Suspension 1 Drop(s) Both EYES every 6 hours  predniSONE   Tablet 5 milliGRAM(s) Oral <User Schedule>  QUEtiapine 12.5 milliGRAM(s) Oral <User Schedule>  simethicone 160 milliGRAM(s) Chew <User Schedule>  sodium chloride 1 Gram(s) Oral every 12 hours  sodium chloride 0.65% Nasal 1 Spray(s) Both Nostrils every 6 hours  witch hazel Pads 1 Application(s) Topical every 12 hours    MEDICATIONS  (PRN):  acetaminophen   Oral Liquid .. 500 milliGRAM(s) Oral every 12 hours PRN Moderate Pain (4 - 6)  calcium carbonate    500 mG (Tums) Chewable 1 Tablet(s) Chew two times a day PRN Gas  diphenhydrAMINE Elixir 12.5 milliGRAM(s) Oral every 6 hours PRN Rash and/or Itching  oxyCODONE    IR 2.5 milliGRAM(s) Oral every 6 hours PRN Severe Pain (7 - 10)      LABS:                        7.5    8.43  )-----------( 122      ( 09 Oct 2024 07:11 )             27.5     10-09    134[L]  |  98  |  25[H]  ----------------------------<  223[H]  4.1   |  24  |  <0.30[L]    Ca    8.4      09 Oct 2024 07:10  Phos  3.0     10-09  Mg     2.3     10-09    TPro  6.5  /  Alb  2.5[L]  /  TBili  0.4  /  DBili  x   /  AST  28  /  ALT  26  /  AlkPhos  84  10-09    PT/INR - ( 09 Oct 2024 07:10 )   PT: 13.7 sec;   INR: 1.19 ratio    PTT - ( 07 Oct 2024 12:37 )  PTT:30.5 sec    Mode: AC/ CMV (Assist Control/ Continuous Mandatory Ventilation)  RR (machine): 18  TV (machine): 350  FiO2: 30  PEEP: 5  ITime: 0.76  MAP: 12  PIP: 40 Patient is a 74y old  Female who presents with a chief complaint of per chart: "75 yo F PMH T2DM on insulin, HTN, HLD, hypothyroidism, RA on Prednisone, Fibromyalgia, cerebral aneurysm s/p repair, chronic hypercapnic respiratory failure s/p trach (vent dependent) and chronic PEG 2022, recurrent C. diff infection (follows with Dr. Newman), persistent GJ tube leaks now s/p GC fistula repair 8/12 BIBEMS with daughter for respiratory distress" (09 Oct 2024 07:07)    Interval Events: No issues overnight    REVIEW OF SYSTEMS:  [ ] Positive  [x ] All other systems negative  [ ] Unable to assess ROS because ________    Vital Signs Last 24 Hrs  T(C): 36.4 (10-09-24 @ 05:00), Max: 38.2 (10-08-24 @ 11:54)  T(F): 97.5 (10-09-24 @ 05:00), Max: 100.8 (10-08-24 @ 11:54)  HR: 103 (10-09-24 @ 05:59) (84 - 105)  BP: 112/57 (10-09-24 @ 05:00) (93/51 - 131/59)  RR: 18 (10-09-24 @ 05:00) (17 - 18)  SpO2: 100% (10-09-24 @ 05:59) (95% - 100%)    PHYSICAL EXAM:    HEENT:   [x ]Tracheostomy: #8 distal XLT Shiley   [x ]Pupils equal  [x ]No oral lesions  [ ]Abnormal    SKIN  [ ]No Rash  [x] Abnormal: B/L buttocks/sacral stage 1-2 pressure injury present on admission   [ ] pressure    CARDIAC  [x ]Regular  [ ]Abnormal:    PULMONARY  [x ]Bilateral Clear Breath Sounds  [ ]Normal Excursion  [ ]Abnormal    GI  [x ]PEG J site c/d/I, old peg stoma site   [x ] +BS		              [x ]Soft, nondistended, no guarding or rebound tenderness   [ ]Abnormal    MUSCULOSKELETAL                                   [x ]Bedbound                 [ ]Abnormal    [ ]Ambulatory/OOB to chair                           EXTREMITIES                                         [ x]Normal  [ ]Edema                           NEUROLOGIC  [x ] Normal, non focal  [ ] Focal findings:    PSYCHIATRIC  [x ]Alert and appropriate  [ ] Sedated	 [ ]Agitated    :  Mcbride: [ ] Yes, if yes: Date of Placement:                   [ x ] No    LINES: Central Lines [ ] Yes, if yes: Date of Placement                                     [ x ] No    HOSPITAL MEDICATIONS:  MEDICATIONS  (STANDING):  acetaminophen   Oral Liquid .. 500 milliGRAM(s) Enteral Tube every 6 hours  acetylcysteine 10%  Inhalation 4 milliLiter(s) Inhalation every 12 hours  albuterol/ipratropium for Nebulization 3 milliLiter(s) Nebulizer every 6 hours  amLODIPine   Tablet 10 milliGRAM(s) Oral <User Schedule>  artificial  tears Solution 1 Drop(s) Both EYES every 6 hours  ascorbic acid 500 milliGRAM(s) Oral daily  Biotene Dry Mouth Oral Rinse 5 milliLiter(s) Swish and Spit every 6 hours  calcium carbonate 1250 mG  + Vitamin D (OsCal 500 + D) 1 Tablet(s) Oral <User Schedule>  cefepime   IVPB 1000 milliGRAM(s) IV Intermittent every 12 hours  chlorhexidine 0.12% Liquid 15 milliLiter(s) Oral Mucosa every 12 hours  chlorhexidine 4% Liquid 1 Application(s) Topical daily  dextrose 5%. 1000 milliLiter(s) (100 mL/Hr) IV Continuous <Continuous>  dextrose 5%. 1000 milliLiter(s) (50 mL/Hr) IV Continuous <Continuous>  dextrose 50% Injectable 12.5 Gram(s) IV Push once  dextrose 50% Injectable 25 Gram(s) IV Push once  dextrose 50% Injectable 25 Gram(s) IV Push once  diclofenac sodium 1% Gel 2 Gram(s) Topical every 6 hours  escitalopram 10 milliGRAM(s) Oral <User Schedule>  fentaNYL   Patch  25 MICROgram(s)/Hr 1 Patch Transdermal every 72 hours  ferrous    sulfate Liquid 300 milliGRAM(s) Oral daily  gabapentin Solution 250 milliGRAM(s) Oral <User Schedule>  glucagon  Injectable 1 milliGRAM(s) IntraMuscular once  hemorrhoidal Ointment 1 Application(s) Rectal at bedtime  influenza  Vaccine (HIGH DOSE) 0.5 milliLiter(s) IntraMuscular once  insulin glargine Injectable (LANTUS) 20 Unit(s) SubCutaneous at bedtime  insulin regular  human recombinant 14 Unit(s) SubCutaneous <User Schedule>  lactobacillus acidophilus 1 Tablet(s) Oral <User Schedule>  levothyroxine 125 MICROGram(s) Oral daily  lidocaine   4% Patch 1 Patch Transdermal every 24 hours  melatonin Liquid 12 milliGRAM(s) Oral <User Schedule>  multivitamin/minerals 1 Tablet(s) Oral daily  pantoprazole  Injectable 40 milliGRAM(s) IV Push every 12 hours  prednisoLONE acetate 1% Suspension 1 Drop(s) Both EYES every 6 hours  predniSONE   Tablet 5 milliGRAM(s) Oral <User Schedule>  QUEtiapine 12.5 milliGRAM(s) Oral <User Schedule>  simethicone 160 milliGRAM(s) Chew <User Schedule>  sodium chloride 1 Gram(s) Oral every 12 hours  sodium chloride 0.65% Nasal 1 Spray(s) Both Nostrils every 6 hours  witch hazel Pads 1 Application(s) Topical every 12 hours    MEDICATIONS  (PRN):  acetaminophen   Oral Liquid .. 500 milliGRAM(s) Oral every 12 hours PRN Moderate Pain (4 - 6)  calcium carbonate    500 mG (Tums) Chewable 1 Tablet(s) Chew two times a day PRN Gas  diphenhydrAMINE Elixir 12.5 milliGRAM(s) Oral every 6 hours PRN Rash and/or Itching  oxyCODONE    IR 2.5 milliGRAM(s) Oral every 6 hours PRN Severe Pain (7 - 10)      LABS:                        7.5    8.43  )-----------( 122      ( 09 Oct 2024 07:11 )             27.5     10-09    134[L]  |  98  |  25[H]  ----------------------------<  223[H]  4.1   |  24  |  <0.30[L]    Ca    8.4      09 Oct 2024 07:10  Phos  3.0     10-09  Mg     2.3     10-09    TPro  6.5  /  Alb  2.5[L]  /  TBili  0.4  /  DBili  x   /  AST  28  /  ALT  26  /  AlkPhos  84  10-09    PT/INR - ( 09 Oct 2024 07:10 )   PT: 13.7 sec;   INR: 1.19 ratio    PTT - ( 07 Oct 2024 12:37 )  PTT:30.5 sec    Mode: AC/ CMV (Assist Control/ Continuous Mandatory Ventilation)  RR (machine): 18  TV (machine): 350  FiO2: 30  PEEP: 5  ITime: 0.76  MAP: 12  PIP: 40

## 2024-10-09 NOTE — DIETITIAN INITIAL EVALUATION ADULT - ENTERAL
Continue [employer] Peptide 1.5, as pt with demonstrated tolerance previously (recurrent diarrhea PTA) and preferred per family preference. Is glycemic controlled. family previously requesting prosource as well. Can Update EN Regimen: [employer] Peptide 1.5 50ml/hr x 18 hours with prosource TF Free daily. continue banatrol to aid in stool bulking, update to daily. Also continue Alfred BID to aid in wound healing.   TOTAL: [employer] Peptide 1.5 50ml/hr x 18 hours + Banatrol daily + Prosource TF Free + Alfred BID: 900ml total EN volume, 1660kcal (30kcal/kg), 85g protein (~1.5g/kg) and 630ml free water.   Defer free water flush to team Continue Casie [place] Peptide 1.5, as pt with demonstrated tolerance previously (recurrent diarrhea PTA) and preferred per family preference. Is glycemic controlled. family previously requesting prosource as well. Can Update EN Regimen: [employer] Peptide 1.5 50ml/hr x 18 hours with prosource TF Free daily. continue banatrol to aid in stool bulking, update to daily. Also continue Alfred BID to aid in wound healing.   TOTAL: Casie [place] Peptide 1.5 50ml/hr x 18 hours + Banatrol daily + Prosource TF Free + Alfred BID: 900ml total EN volume, 1696kcal (31kcal/kg), 85g protein (~1.5g/kg) and 630ml free water.   Defer free water flush to team Continue [employer] Peptide 1.5, as pt with demonstrated tolerance previously (recurrent diarrhea PTA) and preferred per family preference. Is glycemic controlled. family previously requesting prosource as well. Can Update EN Regimen: [employer] Peptide 1.5 50ml/hr x 18 hours with prosource TF Free daily. continue banatrol to aid in stool bulking, update to daily. Also continue Alfred BID to aid in wound healing.   TOTAL: [employer] Peptide 1.5 50ml/hr x 18 hours + Banatrol daily + Prosource TF Free Daily + Alfred BID: 900ml total EN volume, 1696kcal (31kcal/kg), 85g protein (~1.5g/kg) and 630ml free water.   Defer free water flush to team

## 2024-10-10 NOTE — PROGRESS NOTE ADULT - ASSESSMENT
75 yo F PMH T2DM on insulin, HTN, HLD, hypothyroidism, RA on Prednisone, Fibromyalgia, cerebral aneurysm s/p repair, chronic hypercapnic respiratory failure s/p trach (vent dependent) and chronic PEG 2022, recurrent C. diff infection , persistent GJ tube leaks now s/p GC fistula repair 8/12  admitted 10/7/24 with resp distress and found to have RML opacity     Antibiotics  Ceftriaxone 10/7  Azithro 10/7  Cefepime 10/7-->      10/10 melena, anemia, blood tx   for EGD for GJ exchange and piror PEG site closure on 10/11    Suggest  continue Cefepime - 5 day course through last dose 10/11 anticipated  monitor for diarrhea  follow CXR to assure resolution   continue chest PT. Resp therapy for ?RML syndrome    discussed with RCU attending who is skeptical about cxr quality and significance    I will be out until 10/14  - please call ID if needed

## 2024-10-10 NOTE — CHART NOTE - NSCHARTNOTEFT_GEN_A_CORE
Given patient's inability to perform audiogram as an inpatient, recommend follow up for outpatient audiogram.  ENT to sign off.  Please reconsult as needed.    Abner Melvin PA-C  ENT ext. [phone]  ENT pager [phone]

## 2024-10-10 NOTE — PROGRESS NOTE ADULT - ASSESSMENT
75 yo F PMH T2DM on insulin, HTN, HLD, hypothyroidism, RA on Prednisone, Fibromyalgia, cerebral aneurysm s/p repair, chronic hypercapnic respiratory failure s/p trach (vent dependent) and chronic PEG 2022, recurrent C. diff infection (follows with Dr. 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  Pt also noted to have rectal bleeding this past week requiring transfusion 5 days ago in ED as per daughter. Daughter also reports brownish discharge from G-J tube. In ED, pt afebrile, fiO2 96-98% on 40% on ventilator.  Chest X-ray w/ opacification of right lung concerning for possible PNA.  Admitted to RCU for further management.     10/9: Continue Cefepime for 5 days for PNA,10/12) trach evaluated and in good position.  ENT examined ears and no cerumen. Audiology to see patient.  GI to possibly on Wednesday.  Regular insulin re-started 3x/day. and QHS Lantus.

## 2024-10-10 NOTE — PROGRESS NOTE ADULT - SUBJECTIVE AND OBJECTIVE BOX
Interval Events:   Patient was noted to have decreased hemoglobin. No report of hematochezia but had an episode of dark stool per report.     Hospital Medications:  acetylcysteine 10%  Inhalation 4 milliLiter(s) Inhalation every 12 hours  albuterol/ipratropium for Nebulization 3 milliLiter(s) Nebulizer every 6 hours  artificial  tears Solution 1 Drop(s) Both EYES every 6 hours  ascorbic acid 500 milliGRAM(s) Oral daily  Biotene Dry Mouth Oral Rinse 5 milliLiter(s) Swish and Spit every 6 hours  calcium carbonate    500 mG (Tums) Chewable 1 Tablet(s) Chew two times a day PRN  calcium carbonate 1250 mG  + Vitamin D (OsCal 500 + D) 1 Tablet(s) Oral <User Schedule>  cefepime   IVPB 1000 milliGRAM(s) IV Intermittent every 12 hours  chlorhexidine 0.12% Liquid 15 milliLiter(s) Oral Mucosa every 12 hours  chlorhexidine 4% Liquid 1 Application(s) Topical daily  dextrose 5%. 1000 milliLiter(s) IV Continuous <Continuous>  dextrose 5%. 1000 milliLiter(s) IV Continuous <Continuous>  dextrose 50% Injectable 25 Gram(s) IV Push once  dextrose 50% Injectable 25 Gram(s) IV Push once  dextrose 50% Injectable 12.5 Gram(s) IV Push once  diclofenac sodium 1% Gel 2 Gram(s) Topical every 6 hours  diphenhydrAMINE Elixir 12.5 milliGRAM(s) Oral every 6 hours PRN  escitalopram 10 milliGRAM(s) Oral <User Schedule>  fentaNYL   Patch  25 MICROgram(s)/Hr 1 Patch Transdermal every 72 hours  ferrous    sulfate Liquid 300 milliGRAM(s) Oral daily  gabapentin Solution 250 milliGRAM(s) Oral <User Schedule>  glucagon  Injectable 1 milliGRAM(s) IntraMuscular once  hemorrhoidal Ointment 1 Application(s) Rectal at bedtime  influenza  Vaccine (HIGH DOSE) 0.5 milliLiter(s) IntraMuscular once  insulin glargine Injectable (LANTUS) 20 Unit(s) SubCutaneous at bedtime  insulin regular  human recombinant 14 Unit(s) SubCutaneous <User Schedule>  lactobacillus acidophilus 1 Tablet(s) Oral <User Schedule>  levothyroxine 125 MICROGram(s) Oral daily  lidocaine   4% Patch 1 Patch Transdermal every 24 hours  melatonin Liquid 12 milliGRAM(s) Oral <User Schedule>  multivitamin/minerals 1 Tablet(s) Oral daily  oxyCODONE    IR 2.5 milliGRAM(s) Oral every 6 hours PRN  pantoprazole  Injectable 40 milliGRAM(s) IV Push every 12 hours  prednisoLONE acetate 1% Suspension 1 Drop(s) Both EYES every 6 hours  predniSONE   Tablet 5 milliGRAM(s) Oral <User Schedule>  QUEtiapine 12.5 milliGRAM(s) Oral <User Schedule>  simethicone 160 milliGRAM(s) Chew <User Schedule>  sodium chloride 1 Gram(s) Oral every 12 hours  sodium chloride 0.65% Nasal 1 Spray(s) Both Nostrils every 6 hours  witch hazel Pads 1 Application(s) Topical every 12 hours      PHYSICAL EXAM:   Vital Signs:  Vital Signs Last 24 Hrs  T(C): 37.1 (10 Oct 2024 09:25), Max: 37.4 (09 Oct 2024 11:50)  T(F): 98.8 (10 Oct 2024 09:25), Max: 99.4 (10 Oct 2024 09:00)  HR: 78 (10 Oct 2024 09:40) (67 - 103)  BP: 139/63 (10 Oct 2024 09:25) (107/52 - 139/63)  BP(mean): --  RR: 16 (10 Oct 2024 09:40) (14 - 18)  SpO2: 100% (10 Oct 2024 09:40) (96% - 100%)    Parameters below as of 10 Oct 2024 09:40  Patient On (Oxygen Delivery Method): ventilator      Daily     Daily     GENERAL: No acute distress  NEURO: No response to verbal or tactile stimuli  HEENT: Anicteric sclera  CHEST: vent via trach; no accessory muscle use  CARDIAC: regular rate  ABDOMEN: Soft, but bloated. L-abdomen piror stoma covered with drainage bag. GJ site with granulation tissues. Tube is mobile, but discolored within the tube. Rectal exam with RN at bedside: Dark green pasty solid stool, no blood  EXTREMITIES: Warm, curled up    LABS: reviewed                        6.6    6.24  )-----------( 134      ( 10 Oct 2024 06:40 )             24.1     10-10    132[L]  |  99  |  19  ----------------------------<  246[H]  4.5   |  23  |  <0.30[L]    Ca    8.2[L]      10 Oct 2024 06:41  Phos  2.5     10-10  Mg     2.2     10-10    TPro  6.5  /  Alb  2.5[L]  /  TBili  0.4  /  DBili  x   /  AST  28  /  ALT  26  /  AlkPhos  84  10-09

## 2024-10-10 NOTE — PROGRESS NOTE ADULT - ASSESSMENT
73 yo F PMH T2DM on insulin, HTN, HLD, hypothyroidism, RA on Prednisone, Fibromyalgia, cerebral aneurysm s/p repair, chronic hypercapnic respiratory failure s/p trach (vent dependent) and chronic PEG 2022, recurrent C. diff infection (follows with Dr. Newman), persistent GJ tube leaks now s/p GC fistula repair 8/12 BIBEMS with daughter for respiratory distress, hypoxia to high 80s.  Pt also noted to have rectal bleeding this past week requiring transfusion 5 days ago in ED as per daughter.  In ED, pt afebrile, fiO2 96-98% on 40% on ventilator.  Chest Xray w/ opacification of right lung concerning for possible PNA.  Admitted to RCU for further management.    [facility] team was consulted by Dr. Rosales for GJ tube exchange and prior PEG site closure.     Assessment  #S/P GJ placement  #S/p endoscopic PEG site closure but with persistent leakage  #Hypoxia  #Hx of recurrent C. diff; loose BM, gas/bloating  - Currently under workup and treatment for hypoxia  - Hx of C. diff infections. Currently bloated with frequent loose BM per daughter  - S/p EGD 4/2024: Removal of 18Fr replacement G-tube; A large defect was noted in the PEG tract. Using the Roman-Ibrahima port site closure device with endoscopic guidance, two sutures were used to successfully close the defect. A 24 Fr EndoVive Safety gastrostomy tube was placed, and skin marking noted to be 4 cm at the external bumper. A 12 Fr EndoVive Through-The-PEG (TTP) PEG-J tube was then passed through the gastric tube. A resolution clip was used to advance the tube endoscopically to the jejunum. The clip was successfully deployed in jejunum to secure the tube.   - S/p EGD 8/2024:  A moderate-sized area of extrinsic compression was found at the cricopharyngeus likely secondary to neck anatomy and possible osteophyte compression to the neck along with the tracheostomy. A 9 mm fistula was found on the anterior wall of the gastric body. APC'ed and scraped with a cytology brush to improved closure. Unable to use Ovesco (OTSC) clip due to cricopharyngeal narrowing. Total of 3 Mantis clips and two 22 mm Microtech clips were used to for fistula closure. There was evidence of a gastrostomy tube with J tube extension into the duodenum present in the gastric antrum. Avoided intubating the duodenum as not to disrupt the J tube extension. External fistula site was ablated with APC to foster healing.  - Prior stoma still has persistent leakage, especially when patient is bloated.     Recommendations  - Per discussion with RCU team, patient is optimized from respiratory standpoint.   - Decreased hgb without overt GIB at this time. Receiving PRBC, not optimized for non-urgent EGD for GJ exchange.   - Will re-attempt EGD for GJ exchange and piror PEG site closure on 10/11 if medically optimized. NPO after midnight  - Rest of chronic GI condition management per Dr. Rosales outpatient    D/w Dr. Mikael Martin  GI/Hepatology Fellow    MONDAY-FRIDAY 8AM-5PM:  Pager# [phone] ([facility]) or [phone] ([facility])    NON-URGENT CONSULTS:  Please email [email or web address]  AT NIGHT AND ON WEEKENDS:  Contact on-call GI fellow via [email or web address] by paging or hospital  from 5pm-8am and on weekends/holidays

## 2024-10-10 NOTE — PROGRESS NOTE ADULT - PROBLEM SELECTOR PLAN 2
- Patient with Hx of external Hemmorrhoids  - Bleeding Hemmorrhoids noted on exam   - G-J Tube flushed and lavaged no evidence of active bleeding   - CT ABD / Pelvis 10/7: Unchanged gastrocutaneous fistula. Gastrojejunostomy tube is intact.  No focal intra-abdominal/pelvic or subcutaneous collections  - Occult 10/7: Positive  - Monitor CBC / + Active Type and screen  - Maintain NPO accept meds - Patient with Hx of external Hemmorrhoids  - Bleeding Hemmorrhoids noted on exam   - G-J Tube flushed and lavaged no evidence of active bleeding   - CT ABD / Pelvis 10/7: Unchanged gastrocutaneous fistula. Gastrojejunostomy tube is intact.  No focal intra-abdominal/pelvic or subcutaneous collections  - Occult 10/7: Positive  - Monitor CBC / + Active Type and screen  - Maintain NPO accept meds  - will discuss with attending for GI consult for possible EGD/ Colonoscopy

## 2024-10-10 NOTE — PROGRESS NOTE ADULT - NS ATTEND AMEND GEN_ALL_CORE FT
75 yo F h/o DM2 on insulin, HTN, HLD, hypothyroidism, RA on Prednisone, Fibromyalgia, cerebral aneurysm s/p repair, chronic hypoxemia and hypercapnic respiratory failure, tracheostomy and vent dependent, recurrent C. 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  Chest Xray with opacification in the right mid lung field concerning for possible pneumonia.  Admitted to the RCU for further management.     1. Neuro - awake and alert, orientated, mouthing words. Family concerned about worsening hearing loss  - appreciate ENT and audiology eval, patient not cooperating with audiogram, follow up as outpatient.   h/o anxiety - c/w low dose Seroquel and Lexapro  2. Pulm - trach and vent dependent, daily PS trials, trach care. Blood tinged secretions improved - cont to minimize deep suctioning and use soft tipped catheters. Bronchodilators and Sputum cultures with pseudomonas  3. CVS-HD stable, c/w Norvasc  4. 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 Stoma with chronic leak, positional. No further rectal bleeding. Drop in h/h this morning with an episode of melena overnight per nursing, receiving 1 U PRBCs, holding feeds, IV PPI, IVFs  5. ID - RML pneumonia - c/w Cefepime 5 day course, monitor for diarrhea, low threshold to send infectious work up. Appreciate ID eval and recs. Sputum culture +PSA  6. Heme - anemia, thrombocytopenia. intermittent BRBPR from known hemorrhoids, acute anemia this morning thought from possible GI bleed (episode of melena ON) - 1 u PRBCs and f/u post tx CBC. DVT ppx - SCDs  7. Endo - ISS, monitor FS  8. Rheum - c/w prednisone and chronic pain management  9. Derm - appreciate wound care eval and recs for sacrum and gastric stoma wound  above discussed with the patient's daughter and , all questions answered. 75 yo F h/o DM2 on insulin, HTN, HLD, hypothyroidism, RA on Prednisone, Fibromyalgia, cerebral aneurysm s/p repair, chronic hypoxemia and hypercapnic respiratory failure, tracheostomy and vent dependent, recurrent C. 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  Chest Xray with opacification in the right mid lung field concerning for possible pneumonia.  Admitted to the RCU for further management.     1. Neuro - awake and alert, orientated, mouthing words. Family concerned about worsening hearing loss  - appreciate ENT and audiology eval, patient not cooperating with audiogram, follow up as outpatient.   h/o anxiety - c/w low dose Seroquel and Lexapro  2. Pulm - trach and vent dependent, daily PS trials, trach care. Blood tinged secretions improved - cont to minimize deep suctioning and use soft tipped catheters. Bronchodilators and Sputum cultures with pseudomonas  3. CVS-HD stable  4. 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 Stoma with chronic leak, positional. No further rectal bleeding. Drop in h/h this morning with an episode of melena overnight per nursing, receiving 1 U PRBCs, holding feeds, IV PPI, IVFs  5. ID - RML pneumonia - c/w Cefepime 5 day course, monitor for diarrhea, low threshold to send infectious work up. Appreciate ID eval and recs. Sputum culture +PSA  6. Heme - anemia, thrombocytopenia. intermittent BRBPR from known hemorrhoids, acute anemia this morning thought from possible GI bleed (episode of melena ON) - 1 u PRBCs and f/u post tx CBC. DVT ppx - SCDs  7. Endo - ISS, monitor FS  8. Rheum - c/w prednisone and chronic pain management  9. Derm - appreciate wound care eval and recs for sacrum and gastric stoma wound  Medically optimized for planned GI procedure on 10/11.  above discussed with the patient's daughter and , all questions answered.

## 2024-10-10 NOTE — AUDIOLOGICAL ASSESSMENT - COMMENTS
Hx: Pt seen for audio due to concern for change in hearing. Pt with known hearing loss right ear poorer than left ear, family feels hearing has worsened over the last 3 weeks.    Attempted audiogram and patient refused testing. Attempted to re instruct and had patients daughter attempt to communicate via facetime- patient continued to refused testing.    Recs: Audio re eval as medically indicated as outpatient.

## 2024-10-10 NOTE — PROGRESS NOTE ADULT - SUBJECTIVE AND OBJECTIVE BOX
Follow Up:  RML opacity    Interval History/ROS:  fatigued,  had melena  blood tx in progress when seen this am    Allergies  walnut (Unknown)  metronidazole (Rash)  Lyrica (Unknown)  penicillin (Unknown)  Pineapple (Unknown)  Tagamet (Unknown)  Pecan, Filbert, Hazelnut (Unknown)  heparin (Unknown)  Pecans (Unknown)  Hazelnut (Unknown)  pineapple (Unknown)  meropenem (Rash)        ANTIMICROBIALS:  cefepime   IVPB 1000 every 12 hours      OTHER MEDS:  MEDICATIONS  (STANDING):  acetylcysteine 10%  Inhalation 4 every 12 hours  albuterol/ipratropium for Nebulization 3 every 6 hours  calcium carbonate    500 mG (Tums) Chewable 1 two times a day PRN  dextrose 50% Injectable 12.5 once  dextrose 50% Injectable 25 once  dextrose 50% Injectable 25 once  diphenhydrAMINE Elixir 12.5 every 6 hours PRN  escitalopram 10 <User Schedule>  fentaNYL   Patch  25 MICROgram(s)/Hr 1 every 72 hours  gabapentin Solution 250 <User Schedule>  glucagon  Injectable 1 once  influenza  Vaccine (HIGH DOSE) 0.5 once  insulin glargine Injectable (LANTUS) 20 at bedtime  insulin regular  human recombinant 14 <User Schedule>  levothyroxine 125 daily  melatonin Liquid 12 <User Schedule>  oxyCODONE    IR 2.5 every 6 hours PRN  pantoprazole  Injectable 40 every 12 hours  predniSONE   Tablet 5 <User Schedule>  QUEtiapine 12.5 <User Schedule>  simethicone 160 <User Schedule>      Vital Signs Last 24 Hrs  T(C): 36.4 (10 Oct 2024 17:45), Max: 37.4 (10 Oct 2024 09:00)  T(F): 97.5 (10 Oct 2024 17:45), Max: 99.4 (10 Oct 2024 09:00)  HR: 77 (10 Oct 2024 18:07) (67 - 103)  BP: 114/54 (10 Oct 2024 17:45) (108/49 - 139/63)  BP(mean): --  RR: 16 (10 Oct 2024 17:45) (14 - 18)  SpO2: 98% (10 Oct 2024 18:07) (96% - 100%)    Parameters below as of 10 Oct 2024 17:45  Patient On (Oxygen Delivery Method): ventilator        PHYSICAL EXAM:  General: WN/WD NAD, Non-toxic  Neurology: A&Ox3, nonfocal  Respiratory: trach, Clear to auscultation bilaterally  CV: RRR, S1S2, no murmurs, rubs or gallops  Abdominal: Soft, Non-tender, non-distended, normal bowel sounds  Extremities: No edema,   Line Sites: Clear  Skin: No rash                          8.1    7.21  )-----------( 129      ( 10 Oct 2024 16:38 )             27.8       10-10    132[L]  |  99  |  19  ----------------------------<  246[H]  4.5   |  23  |  <0.30[L]    Ca    8.2[L]      10 Oct 2024 06:41  Phos  2.5     10-10  Mg     2.2     10-10    TPro  6.5  /  Alb  2.5[L]  /  TBili  0.4  /  DBili  x   /  AST  28  /  ALT  26  /  AlkPhos  84  10-09      Urinalysis Basic - ( 10 Oct 2024 06:41 )    Color: x / Appearance: x / SG: x / pH: x  Gluc: 246 mg/dL / Ketone: x  / Bili: x / Urobili: x   Blood: x / Protein: x / Nitrite: x   Leuk Esterase: x / RBC: x / WBC x   Sq Epi: x / Non Sq Epi: x / Bacteria: x      MICROBIOLOGY:  Trach Asp Tracheal Aspirate  10-08-24   Few Pseudomonas aeruginosa  Commensal vinay consistent with body site  Culture - Sputum . (10.08.24 @ 08:18)   - Aztreonam: S <=4  - Cefepime: S 4  - Ceftazidime: S <=1  - Ciprofloxacin: R >2  - Imipenem: S 2  - Levofloxacin: R >4  - Meropenem: S <=1  - Piperacillin/Tazobactam: S <=8  Specimen Source: Trach Asp Tracheal Aspirate -- Culture Results: Few Pseudomonas aeruginosa          Clean Catch Clean Catch (Midstream)  10-07-24   10,000 - 49,000 CFU/mL Escherichia coli ESBL  --  Escherichia coli ESBL      .Blood BLOOD  10-07-24   No growth at 72 Hours  --  --      .Blood BLOOD  10-07-24   No growth at 72 Hours  --  --      Clean Catch Clean Catch (Midstream)  10-03-24   Culture grew 3 or more types of organisms which indicate  collection contamination; consider recollection only if clinically  indicated.  --  --      RADIOLOGY:  < from: CT Abdomen and Pelvis w/ IV Cont (10.07.24 @ 14:21) >  IMPRESSION:  Unchanged gastrocutaneous fistula. Gastrojejunostomy tube is intact.  No focal intra-abdominal/pelvic or subcutaneous collections.    < end of copied text >      Zion Newman MD; Division of Infectious Disease; Pager: [phone]; nights and weekends: [phone]

## 2024-10-10 NOTE — PROGRESS NOTE ADULT - SUBJECTIVE AND OBJECTIVE BOX
Patient is a 74y old  Female who presents with a chief complaint of per chart: "73 yo F PMH T2DM on insulin, HTN, HLD, hypothyroidism, RA on Prednisone, Fibromyalgia, cerebral aneurysm s/p repair, chronic hypercapnic respiratory failure s/p trach (vent dependent) and chronic PEG 2022, recurrent C. diff infection (follows with Dr. Newman), persistent GJ tube leaks now s/p GC fistula repair 8/12 BIBEMS with daughter for respiratory distress" (09 Oct 2024 07:07)    Interval Events: (+) Acute blood loss anemia s/p 1 unit PRBC     REVIEW OF SYSTEMS:  [ ] Positive  [x ] All other systems negative  [ ] Unable to assess ROS because ________    Vital Signs Last 24 Hrs  T(C): 36.4 (10-09-24 @ 05:00), Max: 38.2 (10-08-24 @ 11:54)  T(F): 97.5 (10-09-24 @ 05:00), Max: 100.8 (10-08-24 @ 11:54)  HR: 103 (10-09-24 @ 05:59) (84 - 105)  BP: 112/57 (10-09-24 @ 05:00) (93/51 - 131/59)  RR: 18 (10-09-24 @ 05:00) (17 - 18)  SpO2: 100% (10-09-24 @ 05:59) (95% - 100%)    PHYSICAL EXAM:    HEENT:   [x ]Tracheostomy: #8 distal XLT Shiley   [x ]Pupils equal  [x ]No oral lesions  [ ]Abnormal    SKIN  [ ]No Rash  [x] Abnormal: B/L buttocks/sacral stage 1-2 pressure injury present on admission   [ ] pressure    CARDIAC  [x ]Regular  [ ]Abnormal:    PULMONARY  [x ]Bilateral Clear Breath Sounds  [ ]Normal Excursion  [ ]Abnormal    GI  [x ]PEG J site c/d/I, old peg stoma site   [x ] +BS		              [x ]Soft, nondistended, no guarding or rebound tenderness   [ ]Abnormal    MUSCULOSKELETAL                                   [x ]Bedbound                 [ ]Abnormal    [ ]Ambulatory/OOB to chair                           EXTREMITIES                                         [ x]Normal  [ ]Edema                           NEUROLOGIC  [x ] Normal, non focal  [ ] Focal findings:    PSYCHIATRIC  [x ]Alert and appropriate  [ ] Sedated	 [ ]Agitated    :  Mcbride: [ ] Yes, if yes: Date of Placement:                   [ x ] No    LINES: Central Lines [ ] Yes, if yes: Date of Placement                                     [ x ] No    HOSPITAL MEDICATIONS:  MEDICATIONS  (STANDING):  acetaminophen   Oral Liquid .. 500 milliGRAM(s) Enteral Tube every 6 hours  acetylcysteine 10%  Inhalation 4 milliLiter(s) Inhalation every 12 hours  albuterol/ipratropium for Nebulization 3 milliLiter(s) Nebulizer every 6 hours  amLODIPine   Tablet 10 milliGRAM(s) Oral <User Schedule>  artificial  tears Solution 1 Drop(s) Both EYES every 6 hours  ascorbic acid 500 milliGRAM(s) Oral daily  Biotene Dry Mouth Oral Rinse 5 milliLiter(s) Swish and Spit every 6 hours  calcium carbonate 1250 mG  + Vitamin D (OsCal 500 + D) 1 Tablet(s) Oral <User Schedule>  cefepime   IVPB 1000 milliGRAM(s) IV Intermittent every 12 hours  chlorhexidine 0.12% Liquid 15 milliLiter(s) Oral Mucosa every 12 hours  chlorhexidine 4% Liquid 1 Application(s) Topical daily  dextrose 5%. 1000 milliLiter(s) (100 mL/Hr) IV Continuous <Continuous>  dextrose 5%. 1000 milliLiter(s) (50 mL/Hr) IV Continuous <Continuous>  dextrose 50% Injectable 12.5 Gram(s) IV Push once  dextrose 50% Injectable 25 Gram(s) IV Push once  dextrose 50% Injectable 25 Gram(s) IV Push once  diclofenac sodium 1% Gel 2 Gram(s) Topical every 6 hours  escitalopram 10 milliGRAM(s) Oral <User Schedule>  fentaNYL   Patch  25 MICROgram(s)/Hr 1 Patch Transdermal every 72 hours  ferrous    sulfate Liquid 300 milliGRAM(s) Oral daily  gabapentin Solution 250 milliGRAM(s) Oral <User Schedule>  glucagon  Injectable 1 milliGRAM(s) IntraMuscular once  hemorrhoidal Ointment 1 Application(s) Rectal at bedtime  influenza  Vaccine (HIGH DOSE) 0.5 milliLiter(s) IntraMuscular once  insulin glargine Injectable (LANTUS) 20 Unit(s) SubCutaneous at bedtime  insulin regular  human recombinant 14 Unit(s) SubCutaneous <User Schedule>  lactobacillus acidophilus 1 Tablet(s) Oral <User Schedule>  levothyroxine 125 MICROGram(s) Oral daily  lidocaine   4% Patch 1 Patch Transdermal every 24 hours  melatonin Liquid 12 milliGRAM(s) Oral <User Schedule>  multivitamin/minerals 1 Tablet(s) Oral daily  pantoprazole  Injectable 40 milliGRAM(s) IV Push every 12 hours  prednisoLONE acetate 1% Suspension 1 Drop(s) Both EYES every 6 hours  predniSONE   Tablet 5 milliGRAM(s) Oral <User Schedule>  QUEtiapine 12.5 milliGRAM(s) Oral <User Schedule>  simethicone 160 milliGRAM(s) Chew <User Schedule>  sodium chloride 1 Gram(s) Oral every 12 hours  sodium chloride 0.65% Nasal 1 Spray(s) Both Nostrils every 6 hours  witch hazel Pads 1 Application(s) Topical every 12 hours    MEDICATIONS  (PRN):  acetaminophen   Oral Liquid .. 500 milliGRAM(s) Oral every 12 hours PRN Moderate Pain (4 - 6)  calcium carbonate    500 mG (Tums) Chewable 1 Tablet(s) Chew two times a day PRN Gas  diphenhydrAMINE Elixir 12.5 milliGRAM(s) Oral every 6 hours PRN Rash and/or Itching  oxyCODONE    IR 2.5 milliGRAM(s) Oral every 6 hours PRN Severe Pain (7 - 10)      LABS:                        7.5    8.43  )-----------( 122      ( 09 Oct 2024 07:11 )             27.5     10-09    134[L]  |  98  |  25[H]  ----------------------------<  223[H]  4.1   |  24  |  <0.30[L]    Ca    8.4      09 Oct 2024 07:10  Phos  3.0     10-09  Mg     2.3     10-09    TPro  6.5  /  Alb  2.5[L]  /  TBili  0.4  /  DBili  x   /  AST  28  /  ALT  26  /  AlkPhos  84  10-09    PT/INR - ( 09 Oct 2024 07:10 )   PT: 13.7 sec;   INR: 1.19 ratio    PTT - ( 07 Oct 2024 12:37 )  PTT:30.5 sec    Mode: AC/ CMV (Assist Control/ Continuous Mandatory Ventilation)  RR (machine): 18  TV (machine): 350  FiO2: 30  PEEP: 5  ITime: 0.76  MAP: 12  PIP: 40

## 2024-10-11 ENCOUNTER — APPOINTMENT (OUTPATIENT)
Dept: WOUND CARE | Facility: HOSPITAL | Age: 74
End: 2024-10-11

## 2024-10-11 NOTE — PROGRESS NOTE ADULT - PROBLEM SELECTOR PLAN 10
- Patient with Sacral wound prior to admission  - Continue local wound care  - frequent turning and repositioning

## 2024-10-11 NOTE — CHART NOTE - NSCHARTNOTEFT_GEN_A_CORE
Stable hgb and no report of melena.   Will need to reschedule EGD due to scheduling conflicts.   Patient may proceed with EGD/GJ exchange on Monday with Dr. Rosales pending his schedule availability.   Resume feeds as tolerated for the weekend. NPO status per Dr. Rosales.   Communicated to the primary team.

## 2024-10-11 NOTE — PROGRESS NOTE ADULT - PROBLEM SELECTOR PLAN 2
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  No focal intra-abdominal/pelvic or subcutaneous collections  - Occult 10/7: Positive  - Monitor CBC / + Active Type and screen  - Transfused on 10/10 with appropriate response in H+H  - No episodes of melena will resume TFs as per GI   - Possible EGD with G-J Tube exchange next week

## 2024-10-11 NOTE — PROGRESS NOTE ADULT - ASSESSMENT
Impression:    Sacral/bilateral Buttocks stage 4 pressure injury present on admission  Former PEG site/fistula with leakage - MAD  Hemmorrhoids  Incontinence of bowel and bladder  Incontinence Dermatitis    Recommend:  1.) topical therapy: sacral/buttock wound – cleanse with NS, pat dry, apply cavilon to periwound skin, apply aquacel rope to center wound, cover with an allevyn foam dressing daily  2.) Incontinence Management - incontinence cleanser, pads, pericare BID, apply cavilon advanced daily, Back up plan is for coconut oil ointment which daughter has at the bedside.  3.) Maintain on an alternating air with low air loss surface  4.) Turn and reposition Q 2 hours  5.) Nutrition optimization - please add Alfred  6.) Offload heels/feet with complete cair air fluidized boots/pillows; ensure that the soles of the feet are not resting on the foot board of the bed.  7.) Glycemic control, SS insulin coverage  8.) Gastroenterology for Management of PEJ tube/drainage  9.) LUQ former PEG site pouched to manage drainage, back up plan is for aquacel, cover with gauze, secure with minimal tape PRN for saturation    Care as per medicine. Will follow periodically during her admission  Upon discharge f/u as outpatient at [facility] [address] [facility] [phone]  Thank you for this consult  ERICA BanuelosC, CWOCN via TEAMS    Nights/ Weekends/ Holidays please call:  General Surgery Consult pager ([phone]) for emergencies

## 2024-10-11 NOTE — PROGRESS NOTE ADULT - NS ATTEND AMEND GEN_ALL_CORE FT
73 yo F h/o DM2 on insulin, HTN, HLD, hypothyroidism, RA on Prednisone, Fibromyalgia, cerebral aneurysm s/p repair, chronic hypoxemia and hypercapnic respiratory failure, tracheostomy and vent dependent, recurrent C. 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  Chest Xray with opacification in the right mid lung field concerning for possible pneumonia.  Admitted to the RCU for further management.     1. Neuro - awake and alert, orientated, mouthing words. Family concerned about worsening hearing loss  - appreciate ENT and audiology eval, patient not cooperating with audiogram, follow up as outpatient.   h/o anxiety - c/w low dose Seroquel and Lexapro  2. Pulm - trach and vent dependent, daily PS trials, trach care. Blood tinged secretions improved - cont to minimize deep suctioning and use soft tipped catheters. Bronchodilators and Sputum cultures with pseudomonas  3. CVS-HD stable  4. 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 Stoma with chronic leak, positional. No further rectal bleeding. On 10/10, had an acute drop in h/h with an episode of melena overnight per nursing, receiving 1 U PRBCs, holding feeds, IV PPI, IVFs  5. ID - RML pneumonia - last day of Cefepime, monitor for diarrhea, low threshold to send infectious work up. Appreciate ID eval and recs. Sputum culture +PSA  6. Heme - anemia, thrombocytopenia. intermittent BRBPR from known hemorrhoids, acute anemia 10/10 thought from possible GI bleed after episode of melena ON, s/p 1 u PRBCs with approp response. DVT ppx - SCDs  7. Endo - ISS, monitor FS  8. Rheum - c/w prednisone and chronic pain management  9. Derm - appreciate wound care eval and recs for sacrum and gastric stoma wound  Medically optimized for planned GI procedure on 10/11.  above discussed with the patient's daughter, all questions answered.

## 2024-10-11 NOTE — PROGRESS NOTE ADULT - ASSESSMENT
73 yo F PMH T2DM on insulin, HTN, HLD, hypothyroidism, RA on Prednisone, Fibromyalgia, cerebral aneurysm s/p repair, chronic hypercapnic respiratory failure s/p trach (vent dependent) and chronic PEG 2022, recurrent C. diff infection (follows with Dr. 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  Pt also noted to have rectal bleeding this past week requiring transfusion 5 days ago in ED as per daughter. Daughter also reports brownish discharge from G-J tube. In ED, pt afebrile, fiO2 96-98% on 40% on ventilator.  Chest X-ray w/ opacification of right lung concerning for possible PNA.  Admitted to RCU for further management.       10/11: No events reported overnight. Patient remains on Cefepime to complete tomorrow. Patient was unable to have EGD / G-J Exchange today due to scheduling conflict. No reports of melena and stable H+H as per d/w GI Can resume feeds. Will reach out to  on 10/13 to confirm if patient will be rescheduled for 10/14 as per [facility] team as it is his private patient.

## 2024-10-11 NOTE — PROGRESS NOTE ADULT - SUBJECTIVE AND OBJECTIVE BOX
Wound Surgery Progress Note:    HPI:  73 yo F PMH T2DM on insulin, HTN, HLD, hypothyroidism, RA on Prednisone, Fibromyalgia, cerebral aneurysm s/p repair, chronic hypercapnic respiratory failure s/p trach (vent dependent) and chronic PEG 2022, recurrent C. diff infection (follows with Dr. Newman), persistent GJ tube leaks now s/p GC fistula repair 8/12 BIBEMS with daughter for respiratory distress, hypoxia to high 80s.  Pt also noted to have rectal bleeding this past week requiring transfusion 5 days ago in ED as per daughter.  In ED, pt afebrile, fiO2 96-98% on 40% on ventilator.  Chest Xray w/ opacification of right lung concerning for possible PNA.  Admitted to RCU for further management.  (07 Oct 2024 18:58)    Request for wound care reevaluation by patient's family for chronic sacral pressure injury and management of LUQ former PEG site with chronic leakage. Ms. Woods was encountered on an alternating air with low air loss surface. Her aide and spouse were at the bedside. I spoke with her daughter after assessing and dressing wound/fistula by phone. Sacral wound is stable, no bleeding from rectum or hemorrhoids and pouch applied 3 days ago has absolutely no drainage in it.  GI procedure planned for today. Further fistula management pending GI input.  Premier 1 piece drainable Eddie pouch with adapt ring left in place and is intact. Back up plan will remain cleansing with NS, pat dry, apply aquacel, cover with gauze, secure with minimal tape, changing PRN for saturation. I explained that management of the PEJ leakage/vent is up to GI.    She occasionally has bleeding from hemmorrhoids when cleaned after BM and daughter uses coconut oil ointment to manage this. Cavilon advanced being applied but her daughter stated that she is also applying the coconut oil ointment. Back up plan with be application of coconut ointment that the daughter has at the bedside.  She is incontinent of stool and urine. Her immobility, inactivity, incontinence of bowel and bladder in addition to poor nutritional status all contribute to her risk of pressure injury development and hinder healing.     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 No nausea, vomiting, or hematemesis; + Loose BMs, + hemmorrhoids, PEJ with feeds in progress, Old PEG site with leakage  GENITOURINARY: No dysuria, frequency or hematuria  NEUROLOGICAL: + weakness  SKIN: No itching, rashes  PSYCH: no confusion or altered mental status    REVIEW OF SYSTEMS  Unable to obtain due to patient's nonverbal status    MEDICATIONS  (STANDING):  acetylcysteine 10%  Inhalation 4 milliLiter(s) Inhalation every 12 hours  albuterol/ipratropium for Nebulization 3 milliLiter(s) Nebulizer every 6 hours  artificial  tears Solution 1 Drop(s) Both EYES every 6 hours  ascorbic acid 500 milliGRAM(s) Oral daily  Biotene Dry Mouth Oral Rinse 5 milliLiter(s) Swish and Spit every 6 hours  calcium carbonate 1250 mG  + Vitamin D (OsCal 500 + D) 1 Tablet(s) Oral <User Schedule>  cefepime   IVPB 1000 milliGRAM(s) IV Intermittent every 12 hours  chlorhexidine 0.12% Liquid 15 milliLiter(s) Oral Mucosa every 12 hours  chlorhexidine 4% Liquid 1 Application(s) Topical daily  dextrose 5%. 1000 milliLiter(s) (100 mL/Hr) IV Continuous <Continuous>  dextrose 5%. 1000 milliLiter(s) (50 mL/Hr) IV Continuous <Continuous>  dextrose 50% Injectable 12.5 Gram(s) IV Push once  dextrose 50% Injectable 25 Gram(s) IV Push once  dextrose 50% Injectable 25 Gram(s) IV Push once  diclofenac sodium 1% Gel 2 Gram(s) Topical every 6 hours  escitalopram 10 milliGRAM(s) Oral <User Schedule>  fentaNYL   Patch  25 MICROgram(s)/Hr 1 Patch Transdermal every 72 hours  ferrous    sulfate Liquid 300 milliGRAM(s) Oral daily  gabapentin Solution 250 milliGRAM(s) Oral <User Schedule>  glucagon  Injectable 1 milliGRAM(s) IntraMuscular once  hemorrhoidal Ointment 1 Application(s) Rectal at bedtime  influenza  Vaccine (HIGH DOSE) 0.5 milliLiter(s) IntraMuscular once  insulin glargine Injectable (LANTUS) 20 Unit(s) SubCutaneous at bedtime  insulin regular  human recombinant 14 Unit(s) SubCutaneous <User Schedule>  lactated ringers. 1000 milliLiter(s) (75 mL/Hr) IV Continuous <Continuous>  lactobacillus acidophilus 1 Tablet(s) Oral <User Schedule>  levothyroxine 125 MICROGram(s) Oral daily  lidocaine   4% Patch 1 Patch Transdermal every 24 hours  magnesium sulfate  IVPB 1 Gram(s) IV Intermittent once  melatonin Liquid 12 milliGRAM(s) Oral <User Schedule>  multivitamin/minerals 1 Tablet(s) Oral daily  pantoprazole  Injectable 40 milliGRAM(s) IV Push every 12 hours  prednisoLONE acetate 1% Suspension 1 Drop(s) Both EYES every 6 hours  predniSONE   Tablet 5 milliGRAM(s) Oral <User Schedule>  QUEtiapine 12.5 milliGRAM(s) Oral <User Schedule>  simethicone 160 milliGRAM(s) Chew <User Schedule>  sodium chloride 1 Gram(s) Oral every 12 hours  sodium chloride 0.65% Nasal 1 Spray(s) Both Nostrils every 6 hours  witch hazel Pads 1 Application(s) Topical every 12 hours    MEDICATIONS  (PRN):  calcium carbonate    500 mG (Tums) Chewable 1 Tablet(s) Chew two times a day PRN Gas  diphenhydrAMINE Elixir 12.5 milliGRAM(s) Oral every 6 hours PRN Rash and/or Itching  oxyCODONE    IR 2.5 milliGRAM(s) Oral every 6 hours PRN Severe Pain (7 - 10)    Allergies    walnut (Unknown)  metronidazole (Rash)  Lyrica (Unknown)  penicillin (Unknown)  Pineapple (Unknown)  Tagamet (Unknown)  Pecan, Filbert, Hazelnut (Unknown)  heparin (Unknown)  Pecans (Unknown)  Hazelnut (Unknown)  pineapple (Unknown)  meropenem (Rash)    Intolerances    Vital Signs Last 24 Hrs  T(C): 36.8 (11 Oct 2024 10:37), Max: 37.1 (10 Oct 2024 12:30)  T(F): 98.3 (11 Oct 2024 10:37), Max: 98.8 (10 Oct 2024 12:30)  HR: 86 (11 Oct 2024 10:37) (77 - 99)  BP: 145/64 (11 Oct 2024 10:37) (112/49 - 145/64)  BP(mean): --  RR: 18 (11 Oct 2024 10:37) (14 - 18)  SpO2: 96% (11 Oct 2024 10:37) (96% - 100%)    Parameters below as of 11 Oct 2024 09:39  Patient On (Oxygen Delivery Method): ventilator    Physical Exam:  General: alert, WN, obese  Ophthamology: sclera clear  ENMT: moist mucous membranes, trachea midline, trach  Respiratory: equal chest rise with respirations  Gastrointestinal: soft NT, slightly distended, PEJ with feeds on hold, no leakage, PEG site with occasional drainage, periPEG site with slight erythema, hemmorrhoids  Neurology: nonverbal, not following commands  Psych: calm  Musculoskeletal: no contractures  Vascular: BLE edema equal  Skin:  Sacral/bilateral buttocks wound with central, small deep ulceration 1cm x 1cm x D 0.5cm, with superficially denuded skin extending , L 6cm x 1.5cm x 0.5cm, small amount of serosanguinous drainage  No odor, increased warmth, tenderness, induration, crepitus, fluctuance    LABS:  10-11    134[L]  |  102  |  7   ----------------------------<  125[H]  3.9   |  22  |  <0.30[L]    Ca    8.2[L]      11 Oct 2024 08:55  Phos  3.2     10-11  Mg     1.5     10-11                            8.1    7.21  )-----------( 129      ( 10 Oct 2024 16:38 )             27.8       Urinalysis Basic - ( 11 Oct 2024 08:55 )    Color: x / Appearance: x / SG: x / pH: x  Gluc: 125 mg/dL / Ketone: x  / Bili: x / Urobili: x   Blood: x / Protein: x / Nitrite: x   Leuk Esterase: x / RBC: x / WBC x   Sq Epi: x / Non Sq Epi: x / Bacteria: x

## 2024-10-11 NOTE — PROGRESS NOTE ADULT - PROBLEM SELECTOR PLAN 9
- Na 130+ on admission; improved to 134 today  - As per daughter pt usually receives 70 cc/hr of free water flushes per hr   - Will decrease free water flushes to 20 cc q hr to prevent j-tube from clogging  - Patient on sodium chloride 1 gm daily at home   - Continue increased dose of 1 gram q 12 hrs   - Monitor  BMP

## 2024-10-11 NOTE — PROGRESS NOTE ADULT - SUBJECTIVE AND OBJECTIVE BOX
Patient is a 74y old  Female who presents with a chief complaint of Patient admitted with Hypoxemia and episode of Bright red blood per rectum (10 Oct 2024 19:22)      Interval Events: No events reported overnight     REVIEW OF SYSTEMS:  [ ] Positive  [ ] All other systems negative  [ ] Unable to assess ROS because ________    Vital Signs Last 24 Hrs  T(C): 36.4 (10-10-24 @ 23:42), Max: 37.1 (10-10-24 @ 09:25)  T(F): 97.6 (10-10-24 @ 23:42), Max: 98.8 (10-10-24 @ 09:25)  HR: 79 (10-11-24 @ 06:17) (77 - 99)  BP: 115/55 (10-10-24 @ 23:42) (112/49 - 139/63)  RR: 16 (10-10-24 @ 23:42) (14 - 16)  SpO2: 99% (10-11-24 @ 06:17) (97% - 100%)    PHYSICAL EXAM:  HEENT:   [ ]Tracheostomy:  [ ]Pupils equal  [ ]No oral lesions  [ ]Abnormal    SKIN  [ ]No Rash  [ ] Abnormal  [ ] pressure    CARDIAC  [ ]Regular  [ ]Abnormal    PULMONARY  [ ]Bilateral Clear Breath Sounds  [ ]Normal Excursion  [ ]Abnormal    GI  [ ]PEG      [ ] +BS		              [ ]Soft, nondistended, nontender	  [ ]Abnormal    MUSCULOSKELETAL                                   [ ]Bedbound                 [ ]Abnormal    [ ]Ambulatory/OOB to chair                           EXTREMITIES                                         [ ]Normal  [ ]Edema                           NEUROLOGIC  [ ] Normal, non focal  [ ] Focal findings:    PSYCHIATRIC  [ ]Alert and appropriate  [ ] Sedated	 [ ]Agitated    :  Mcbride: [ ] Yes, if yes: Date of Placement:                   [  ] No    LINES: Central Lines [ ] Yes, if yes: Date of Placement                                     [  ] No    HOSPITAL MEDICATIONS:  MEDICATIONS  (STANDING):  acetylcysteine 10%  Inhalation 4 milliLiter(s) Inhalation every 12 hours  albuterol/ipratropium for Nebulization 3 milliLiter(s) Nebulizer every 6 hours  artificial  tears Solution 1 Drop(s) Both EYES every 6 hours  ascorbic acid 500 milliGRAM(s) Oral daily  Biotene Dry Mouth Oral Rinse 5 milliLiter(s) Swish and Spit every 6 hours  calcium carbonate 1250 mG  + Vitamin D (OsCal 500 + D) 1 Tablet(s) Oral <User Schedule>  cefepime   IVPB 1000 milliGRAM(s) IV Intermittent every 12 hours  chlorhexidine 0.12% Liquid 15 milliLiter(s) Oral Mucosa every 12 hours  chlorhexidine 4% Liquid 1 Application(s) Topical daily  dextrose 5%. 1000 milliLiter(s) (100 mL/Hr) IV Continuous <Continuous>  dextrose 5%. 1000 milliLiter(s) (50 mL/Hr) IV Continuous <Continuous>  dextrose 50% Injectable 12.5 Gram(s) IV Push once  dextrose 50% Injectable 25 Gram(s) IV Push once  dextrose 50% Injectable 25 Gram(s) IV Push once  diclofenac sodium 1% Gel 2 Gram(s) Topical every 6 hours  escitalopram 10 milliGRAM(s) Oral <User Schedule>  fentaNYL   Patch  25 MICROgram(s)/Hr 1 Patch Transdermal every 72 hours  ferrous    sulfate Liquid 300 milliGRAM(s) Oral daily  gabapentin Solution 250 milliGRAM(s) Oral <User Schedule>  glucagon  Injectable 1 milliGRAM(s) IntraMuscular once  hemorrhoidal Ointment 1 Application(s) Rectal at bedtime  influenza  Vaccine (HIGH DOSE) 0.5 milliLiter(s) IntraMuscular once  insulin glargine Injectable (LANTUS) 20 Unit(s) SubCutaneous at bedtime  insulin regular  human recombinant 14 Unit(s) SubCutaneous <User Schedule>  lactated ringers. 1000 milliLiter(s) (75 mL/Hr) IV Continuous <Continuous>  lactobacillus acidophilus 1 Tablet(s) Oral <User Schedule>  levothyroxine 125 MICROGram(s) Oral daily  lidocaine   4% Patch 1 Patch Transdermal every 24 hours  melatonin Liquid 12 milliGRAM(s) Oral <User Schedule>  multivitamin/minerals 1 Tablet(s) Oral daily  pantoprazole  Injectable 40 milliGRAM(s) IV Push every 12 hours  prednisoLONE acetate 1% Suspension 1 Drop(s) Both EYES every 6 hours  predniSONE   Tablet 5 milliGRAM(s) Oral <User Schedule>  QUEtiapine 12.5 milliGRAM(s) Oral <User Schedule>  simethicone 160 milliGRAM(s) Chew <User Schedule>  sodium chloride 1 Gram(s) Oral every 12 hours  sodium chloride 0.65% Nasal 1 Spray(s) Both Nostrils every 6 hours  witch hazel Pads 1 Application(s) Topical every 12 hours    MEDICATIONS  (PRN):  calcium carbonate    500 mG (Tums) Chewable 1 Tablet(s) Chew two times a day PRN Gas  diphenhydrAMINE Elixir 12.5 milliGRAM(s) Oral every 6 hours PRN Rash and/or Itching  oxyCODONE    IR 2.5 milliGRAM(s) Oral every 6 hours PRN Severe Pain (7 - 10)      LABS:                        8.1    7.21  )-----------( 129      ( 10 Oct 2024 16:38 )             27.8     10-10    132[L]  |  99  |  19  ----------------------------<  246[H]  4.5   |  23  |  <0.30[L]    Ca    8.2[L]      10 Oct 2024 06:41  Phos  2.5     10-10  Mg     2.2     10-10        Urinalysis Basic - ( 10 Oct 2024 06:41 )    Color: x / Appearance: x / SG: x / pH: x  Gluc: 246 mg/dL / Ketone: x  / Bili: x / Urobili: x   Blood: x / Protein: x / Nitrite: x   Leuk Esterase: x / RBC: x / WBC x   Sq Epi: x / Non Sq Epi: x / Bacteria: x          CAPILLARY BLOOD GLUCOSE    MICROBIOLOGY:     RADIOLOGY:  [ ] Reviewed and interpreted by me    Mode: AC/ CMV (Assist Control/ Continuous Mandatory Ventilation)  RR (machine): 18  TV (machine): 350  FiO2: 30  PEEP: 5  ITime: 1  MAP: 11  PIP: 33   Patient is a 74y old  Female who presents with a chief complaint of Patient admitted with Hypoxemia and episode of Bright red blood per rectum (10 Oct 2024 19:22)      Interval Events: No events reported overnight     REVIEW OF SYSTEMS:  [ ] Positive  [x] All other systems negative  [ ] Unable to assess ROS because ________    Vital Signs Last 24 Hrs  T(C): 36.4 (10-10-24 @ 23:42), Max: 37.1 (10-10-24 @ 09:25)  T(F): 97.6 (10-10-24 @ 23:42), Max: 98.8 (10-10-24 @ 09:25)  HR: 79 (10-11-24 @ 06:17) (77 - 99)  BP: 115/55 (10-10-24 @ 23:42) (112/49 - 139/63)  RR: 16 (10-10-24 @ 23:42) (14 - 16)  SpO2: 99% (10-11-24 @ 06:17) (97% - 100%)    PHYSICAL EXAM:  HEENT:   [x]Tracheostomy: #8 distal XLT Shiley   [x]Pupils equal  [x]No oral lesions  [ ]Abnormal    SKIN  [ ]No Rash  [x] Abnormal: B/L buttocks/sacral stage 1-2 pressure injury present on admission   [ ] pressure    CARDIAC  [x]Regular  [ ]Abnormal:    PULMONARY  [x]Bilateral Clear Breath Sounds  [ ]Normal Excursion  [ ]Abnormal    GI  [x]PEG J site c/d/I, old peg stoma site with leakage clear fluid [x] +BS		              [x]Soft, nondistended, no guarding or rebound tenderness   [ ]Abnormal    MUSCULOSKELETAL                                   [x]Bedbound                 [ ]Abnormal    [ ]Ambulatory/OOB to chair                           EXTREMITIES                                         [x]Normal  [ ]Edema                           NEUROLOGIC  [x] Normal, non focal  [ ] Focal findings:    PSYCHIATRIC  [x]Alert and appropriate  [ ] Sedated	 [ ]Agitated    :  Mcbride: [ ] Yes, if yes: Date of Placement:                   [x] No    LINES: Central Lines [ ] Yes, if yes: Date of Placement                                     [x] No      HOSPITAL MEDICATIONS:  MEDICATIONS  (STANDING):  acetylcysteine 10%  Inhalation 4 milliLiter(s) Inhalation every 12 hours  albuterol/ipratropium for Nebulization 3 milliLiter(s) Nebulizer every 6 hours  artificial  tears Solution 1 Drop(s) Both EYES every 6 hours  ascorbic acid 500 milliGRAM(s) Oral daily  Biotene Dry Mouth Oral Rinse 5 milliLiter(s) Swish and Spit every 6 hours  calcium carbonate 1250 mG  + Vitamin D (OsCal 500 + D) 1 Tablet(s) Oral <User Schedule>  cefepime   IVPB 1000 milliGRAM(s) IV Intermittent every 12 hours  chlorhexidine 0.12% Liquid 15 milliLiter(s) Oral Mucosa every 12 hours  chlorhexidine 4% Liquid 1 Application(s) Topical daily  dextrose 5%. 1000 milliLiter(s) (100 mL/Hr) IV Continuous <Continuous>  dextrose 5%. 1000 milliLiter(s) (50 mL/Hr) IV Continuous <Continuous>  dextrose 50% Injectable 12.5 Gram(s) IV Push once  dextrose 50% Injectable 25 Gram(s) IV Push once  dextrose 50% Injectable 25 Gram(s) IV Push once  diclofenac sodium 1% Gel 2 Gram(s) Topical every 6 hours  escitalopram 10 milliGRAM(s) Oral <User Schedule>  fentaNYL   Patch  25 MICROgram(s)/Hr 1 Patch Transdermal every 72 hours  ferrous    sulfate Liquid 300 milliGRAM(s) Oral daily  gabapentin Solution 250 milliGRAM(s) Oral <User Schedule>  glucagon  Injectable 1 milliGRAM(s) IntraMuscular once  hemorrhoidal Ointment 1 Application(s) Rectal at bedtime  influenza  Vaccine (HIGH DOSE) 0.5 milliLiter(s) IntraMuscular once  insulin glargine Injectable (LANTUS) 20 Unit(s) SubCutaneous at bedtime  insulin regular  human recombinant 14 Unit(s) SubCutaneous <User Schedule>  lactated ringers. 1000 milliLiter(s) (75 mL/Hr) IV Continuous <Continuous>  lactobacillus acidophilus 1 Tablet(s) Oral <User Schedule>  levothyroxine 125 MICROGram(s) Oral daily  lidocaine   4% Patch 1 Patch Transdermal every 24 hours  melatonin Liquid 12 milliGRAM(s) Oral <User Schedule>  multivitamin/minerals 1 Tablet(s) Oral daily  pantoprazole  Injectable 40 milliGRAM(s) IV Push every 12 hours  prednisoLONE acetate 1% Suspension 1 Drop(s) Both EYES every 6 hours  predniSONE   Tablet 5 milliGRAM(s) Oral <User Schedule>  QUEtiapine 12.5 milliGRAM(s) Oral <User Schedule>  simethicone 160 milliGRAM(s) Chew <User Schedule>  sodium chloride 1 Gram(s) Oral every 12 hours  sodium chloride 0.65% Nasal 1 Spray(s) Both Nostrils every 6 hours  witch hazel Pads 1 Application(s) Topical every 12 hours    MEDICATIONS  (PRN):  calcium carbonate    500 mG (Tums) Chewable 1 Tablet(s) Chew two times a day PRN Gas  diphenhydrAMINE Elixir 12.5 milliGRAM(s) Oral every 6 hours PRN Rash and/or Itching  oxyCODONE    IR 2.5 milliGRAM(s) Oral every 6 hours PRN Severe Pain (7 - 10)      LABS:                        8.1    7.21  )-----------( 129      ( 10 Oct 2024 16:38 )             27.8     10-10    132[L]  |  99  |  19  ----------------------------<  246[H]  4.5   |  23  |  <0.30[L]    Ca    8.2[L]      10 Oct 2024 06:41  Phos  2.5     10-10  Mg     2.2     10-10        Urinalysis Basic - ( 10 Oct 2024 06:41 )    Color: x / Appearance: x / SG: x / pH: x  Gluc: 246 mg/dL / Ketone: x  / Bili: x / Urobili: x   Blood: x / Protein: x / Nitrite: x   Leuk Esterase: x / RBC: x / WBC x   Sq Epi: x / Non Sq Epi: x / Bacteria: x          CAPILLARY BLOOD GLUCOSE    MICROBIOLOGY:     RADIOLOGY:  [ ] Reviewed and interpreted by me    Mode: AC/ CMV (Assist Control/ Continuous Mandatory Ventilation)  RR (machine): 18  TV (machine): 350  FiO2: 30  PEEP: 5  ITime: 1  MAP: 11  PIP: 33

## 2024-10-11 NOTE — PROGRESS NOTE ADULT - PROBLEM SELECTOR PLAN 4
- Patient on Lantus and Humalog at home    - Regular insulin re-started 3x/day  - Continue Lantus   - Monitor BGMS

## 2024-10-11 NOTE — PROGRESS NOTE ADULT - PROBLEM SELECTOR PLAN 1
- Patient with Chronic Trach at baseline   - Current Vent Settings: Volume AC :  RR: 18 PEEP: 5 FIO2: 40%  - CXR 10/7: Opacification of the right middle lobe may represent pneumonia versus atelectasis  - Received Rocephin and Azithro in the ED   - Sputum cx 10/8: PSA  - Continue Cefepime to complete on 10/12  - ID continues to follow

## 2024-10-11 NOTE — PROGRESS NOTE ADULT - PROBLEM SELECTOR PLAN 8
- Patient has known hx of prior G-Tube stoma leak   - S/p EGD for closure of Gastric Fistula (8/12) w/ Total of 3 Mantis clips and two 22 mm Microtech clips by GI Dr Rosales   - Old stoma peg site with mild clear drainage s/p repair of fistula  - As per daughter feeds and meds all administered via j-port and g-tube remains to continuously vented for chronic gaseous distention   - Tentative G-J Tube exchanged on 10/14; Due to intermittent clogging and tube discoloration

## 2024-10-12 NOTE — PROGRESS NOTE ADULT - ASSESSMENT
In summary, "73 yo F PMH T2DM on insulin, HTN, HLD, hypothyroidism, RA on Prednisone, Fibromyalgia, cerebral aneurysm s/p repair, chronic hypercapnic respiratory failure s/p trach (vent dependent) and chronic PEG 2022, recurrent C. diff infection (follows with Dr. Newman), persistent GJ tube leaks now s/p GC fistula repair 8/12 BIBEMS with daughter for respiratory distress, hypoxia to high 80s.  Pt also noted to have rectal bleeding this past week requiring transfusion 5 days ago in ED as per daughter.  In ED, pt afebrile, fiO2 96-98% on 40% on ventilator.  Chest Xray w/ opacification of right lung concerning for possible PNA.  Admitted to RCU for further management"    No evidence of ongoing GI bleed.    Plan on Replacing GJ tube along with reattempt closure of fistula on Monday.  Please hold tube feeding Sunday midnight.    Anil Rosales MD

## 2024-10-12 NOTE — PROGRESS NOTE ADULT - NS ATTEND AMEND GEN_ALL_CORE FT
75 yo F h/o DM2 on insulin, HTN, HLD, hypothyroidism, RA on Prednisone, Fibromyalgia, cerebral aneurysm s/p repair, chronic hypoxemia and hypercapnic respiratory failure, tracheostomy and vent dependent, recurrent C. 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  Chest Xray with opacification in the right mid lung field concerning for possible pneumonia.  Admitted to the RCU for further management.     1. Neuro - awake and alert, orientated, mouthing words. Family concerned about worsening hearing loss  - appreciate ENT and audiology eval, patient not cooperating with audiogram, follow up as outpatient.   h/o anxiety - c/w low dose Seroquel and Lexapro  2. Pulm - trach and vent dependent, daily PS trials, trach care. Blood tinged secretions improved - cont to minimize deep suctioning and use soft tipped catheters. Bronchodilators and Sputum cultures with pseudomonas  3. CVS-HD stable  4. 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 Stoma with chronic leak, positional. No further rectal bleeding. On 10/10, had an acute drop in h/h with an episode of melena overnight per nursing, receiving 1 U PRBCs, holding feeds, IV PPI, IVFs  5. ID - RML pneumonia - last day of Cefepime, monitor for diarrhea, low threshold to send infectious work up. Appreciate ID eval and recs. Sputum culture +PSA  6. Heme - anemia, thrombocytopenia. intermittent BRBPR from known hemorrhoids, acute anemia 10/10 thought from possible GI bleed after episode of melena ON, s/p 1 u PRBCs with approp response. DVT ppx - SCDs  7. Endo - ISS, monitor FS  8. Rheum - c/w prednisone and chronic pain management  9. Derm - appreciate wound care eval and recs for sacrum and gastric stoma wound  Medically optimized for planned GI procedure on 10/11.  above discussed with the patient's daughter, all questions answered. Pt is a 74F with MHx IDDM2, HTN, HLD, hypothyroidism, RA on Prednisone, fibromyalgia, cerebral aneurysm s/p repair, chronic hypoxemia and hypercapnic respiratory failure, tracheostomy and vent dependent, recurrent C. diff infection, chronic PEG 2022 with persistent GJ tube leaks now s/p GC fistula repair 8/12, recent presentation 5 days PTA for rectal bleeding requiring transfusion in the ED who now presents with acute hypoxemic respiratory distress likely 2/2 RML PNA admitted to the RCU for further management.     1. Neuro - awake and alert, orientated, mouthing words. Family concerned about worsening hearing loss  - appreciate ENT and audiology eval, patient not cooperating with audiogram, follow up as outpatient. h/o anxiety - c/w low dose Seroquel and Lexapro. Family concerned about underdosing home pain medications, pt should be on Fentanyl 37.5mcg but is on 25mcg with oxycodone prn. Pt requiring prns, will validate with home house physician and increase appropriately.   2. Pulm - trach and vent dependent, daily PS trials, trach care. Did not do well with PSV trial today. Blood tinged secretions improved - cont to minimize deep suctioning and use soft tipped catheters. Bronchodilators and Sputum cultures with pseudomonas.  3. CVS-HD stable  4. 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 Stoma with chronic leak, positional. No further rectal bleeding. On 10/10, had an acute drop in h/h with an episode of melena overnight per nursing, receiving 1 U PRBCs with appropriate response. No further bleeding. IV PPI added. Feeds restarted today, will continue to uptitrate as tolerated. G-vent and j-feeds as per hospital routine to decrease abdominal distention. GI following, recs appreciated. Plan for replacement of GJ tube along with reattempt at closure of fistula on Monday. NPO Sunday MN.  5. ID - RML pneumonia - last day of Cefepime 10/11. Pt with persistent diarrhea, recheck CDiff. Appreciate ID eval and recs. Sputum culture +PSA.   6. Heme - anemia, thrombocytopenia. intermittent BRBPR from known hemorrhoids, acute anemia 10/10 thought from possible GI bleed after episode of melena ON, s/p 1 u PRBCs with appropriate response. DVT ppx  SCDs. No further bleeding episodes. CBC stable. Will CTM closely.   7. Endo - ISS and BGFS monitoring  8. Rheum - c/w prednisone and chronic pain management  9. Derm - appreciate wound care eval and recs for sacrum and gastric stoma wound  Medically optimized for planned GI procedure on 10/14.  Above discussed with the patient's daughter via telephone and  at bedside, all questions answered.

## 2024-10-12 NOTE — PROGRESS NOTE ADULT - TIME BILLING
review of EMR, labs, imaging, medical management, coordination of care, discussion with family, patient, RCU team and consultants. Pt seen and examined at bedside. Review of patient records, including history, laboratory data, and imaging. Patient evaluation and assessment. Coordination of care. Time excludes teaching or procedures.

## 2024-10-12 NOTE — PROGRESS NOTE ADULT - PROBLEM SELECTOR PLAN 2
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  No focal intra-abdominal/pelvic or subcutaneous collections  - Occult 10/7: Positive  - Monitor CBC / + Active Type and screen  - Transfused on 10/10 with appropriate response in H+H  - No episodes of melena will resume TFs as per GI   - Possible EGD with G-J Tube exchange next week 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  No focal intra-abdominal/pelvic or subcutaneous collections  - Occult 10/7: Positive  - Monitor CBC / + Active Type and screen  - Transfused on 10/10 with appropriate response in H+H  - No episodes of melena will resume TFs as per GI   - Possible EGD with G-J Tube exchange next week  - GI called for G-J tube leak, NPO for now. GI to follow up

## 2024-10-12 NOTE — PROGRESS NOTE ADULT - SUBJECTIVE AND OBJECTIVE BOX
Patient is a 74y old  Female who presents with a chief complaint of Patient admitted with Hypoxemia and episode of Bright red blood per rectum (11 Oct 2024 11:15)      Interval Events:    REVIEW OF SYSTEMS:  [ ] Positive  [ ] All other systems negative  [ ] Unable to assess ROS because ________    Vital Signs Last 24 Hrs  T(C): 36.8 (10-12-24 @ 00:05), Max: 36.8 (10-11-24 @ 10:37)  T(F): 98.2 (10-12-24 @ 00:05), Max: 98.3 (10-11-24 @ 10:37)  HR: 86 (10-12-24 @ 06:05) (77 - 105)  BP: 142/62 (10-12-24 @ 00:05) (122/63 - 145/64)  RR: 18 (10-12-24 @ 00:05) (17 - 22)  SpO2: 100% (10-12-24 @ 06:05) (96% - 100%)      PHYSICAL EXAM:  HEENT:   [x]Tracheostomy: #8 distal XLT Shiley   [x]Pupils equal  [x]No oral lesions  [ ]Abnormal    SKIN  [ ]No Rash  [x] Abnormal: B/L buttocks/sacral stage 1-2 pressure injury present on admission   [ ] pressure    CARDIAC  [x]Regular  [ ]Abnormal:    PULMONARY  [x]Bilateral Clear Breath Sounds  [ ]Normal Excursion  [ ]Abnormal    GI  [x]PEG J site c/d/I, old peg stoma site with leakage clear fluid [x] +BS		              [x]Soft, nondistended, no guarding or rebound tenderness   [ ]Abnormal    MUSCULOSKELETAL                                   [x]Bedbound                 [ ]Abnormal    [ ]Ambulatory/OOB to chair                           EXTREMITIES                                         [x]Normal  [ ]Edema                           NEUROLOGIC  [x] Normal, non focal  [ ] Focal findings:    PSYCHIATRIC  [x]Alert and appropriate  [ ] Sedated	 [ ]Agitated    :  Mcbride: [ ] Yes, if yes: Date of Placement:                   [x] No    LINES: Central Lines [ ] Yes, if yes: Date of Placement                                     [x] No    HOSPITAL MEDICATIONS:  MEDICATIONS  (STANDING):  acetylcysteine 10%  Inhalation 4 milliLiter(s) Inhalation every 12 hours  albuterol/ipratropium for Nebulization 3 milliLiter(s) Nebulizer every 6 hours  artificial  tears Solution 1 Drop(s) Both EYES every 6 hours  ascorbic acid 500 milliGRAM(s) Oral daily  Biotene Dry Mouth Oral Rinse 5 milliLiter(s) Swish and Spit every 6 hours  calcium carbonate 1250 mG  + Vitamin D (OsCal 500 + D) 1 Tablet(s) Oral <User Schedule>  chlorhexidine 0.12% Liquid 15 milliLiter(s) Oral Mucosa every 12 hours  chlorhexidine 4% Liquid 1 Application(s) Topical daily  dextrose 5%. 1000 milliLiter(s) (100 mL/Hr) IV Continuous <Continuous>  dextrose 5%. 1000 milliLiter(s) (50 mL/Hr) IV Continuous <Continuous>  dextrose 50% Injectable 25 Gram(s) IV Push once  dextrose 50% Injectable 25 Gram(s) IV Push once  dextrose 50% Injectable 12.5 Gram(s) IV Push once  diclofenac sodium 1% Gel 2 Gram(s) Topical every 6 hours  escitalopram 10 milliGRAM(s) Oral <User Schedule>  fentaNYL   Patch  25 MICROgram(s)/Hr 1 Patch Transdermal every 72 hours  ferrous    sulfate Liquid 300 milliGRAM(s) Oral daily  gabapentin Solution 250 milliGRAM(s) Oral <User Schedule>  glucagon  Injectable 1 milliGRAM(s) IntraMuscular once  hemorrhoidal Ointment 1 Application(s) Rectal at bedtime  influenza  Vaccine (HIGH DOSE) 0.5 milliLiter(s) IntraMuscular once  insulin glargine Injectable (LANTUS) 20 Unit(s) SubCutaneous at bedtime  insulin regular  human recombinant 14 Unit(s) SubCutaneous <User Schedule>  lactobacillus acidophilus 1 Tablet(s) Oral <User Schedule>  levothyroxine 125 MICROGram(s) Oral daily  lidocaine   4% Patch 1 Patch Transdermal every 24 hours  melatonin Liquid 12 milliGRAM(s) Oral <User Schedule>  multivitamin/minerals 1 Tablet(s) Oral daily  pantoprazole  Injectable 40 milliGRAM(s) IV Push every 12 hours  prednisoLONE acetate 1% Suspension 1 Drop(s) Both EYES every 6 hours  predniSONE   Tablet 5 milliGRAM(s) Oral <User Schedule>  QUEtiapine 12.5 milliGRAM(s) Oral <User Schedule>  simethicone 160 milliGRAM(s) Chew <User Schedule>  sodium chloride 1 Gram(s) Oral every 12 hours  sodium chloride 0.65% Nasal 1 Spray(s) Both Nostrils every 6 hours  witch hazel Pads 1 Application(s) Topical every 12 hours    MEDICATIONS  (PRN):  calcium carbonate    500 mG (Tums) Chewable 1 Tablet(s) Chew two times a day PRN Gas  diphenhydrAMINE Elixir 12.5 milliGRAM(s) Oral every 6 hours PRN Rash and/or Itching  oxyCODONE    IR 2.5 milliGRAM(s) Oral every 6 hours PRN Severe Pain (7 - 10)      LABS:                        8.5    7.27  )-----------( 128      ( 12 Oct 2024 06:46 )             29.5     10-12    134[L]  |  100  |  9   ----------------------------<  164[H]  3.5   |  22  |  <0.30[L]    Ca    7.8[L]      12 Oct 2024 06:46  Phos  2.7     10-12  Mg     1.5     10-12        Urinalysis Basic - ( 12 Oct 2024 06:46 )    Color: x / Appearance: x / SG: x / pH: x  Gluc: 164 mg/dL / Ketone: x  / Bili: x / Urobili: x   Blood: x / Protein: x / Nitrite: x   Leuk Esterase: x / RBC: x / WBC x   Sq Epi: x / Non Sq Epi: x / Bacteria: x          CAPILLARY BLOOD GLUCOSE    MICROBIOLOGY:     RADIOLOGY:  [ ] Reviewed and interpreted by me    Mode: AC/ CMV (Assist Control/ Continuous Mandatory Ventilation)  RR (machine): 18  TV (machine): 350  FiO2: 30  PEEP: 5  ITime: 1  MAP: 11  PIP: 33   Patient is a 74y old  Female who presents with a chief complaint of Patient admitted with Hypoxemia and episode of Bright red blood per rectum (11 Oct 2024 11:15)      Interval Events:    REVIEW OF SYSTEMS:  [ ] Positive  [ ] All other systems negative  [X ] Unable to assess ROS because due to underlying conditions    Vital Signs Last 24 Hrs  T(C): 36.8 (10-12-24 @ 00:05), Max: 36.8 (10-11-24 @ 10:37)  T(F): 98.2 (10-12-24 @ 00:05), Max: 98.3 (10-11-24 @ 10:37)  HR: 86 (10-12-24 @ 06:05) (77 - 105)  BP: 142/62 (10-12-24 @ 00:05) (122/63 - 145/64)  RR: 18 (10-12-24 @ 00:05) (17 - 22)  SpO2: 100% (10-12-24 @ 06:05) (96% - 100%)    PHYSICAL EXAM:  HEENT:   [x]Tracheostomy: #8 distal XLT Shiley   [x]Pupils equal  [x]No oral lesions  [ ]Abnormal    SKIN  [ ]No Rash  [x] Abnormal: B/L buttocks/sacral stage 1-2 pressure injury present on admission   [ ] pressure    CARDIAC  [x]Regular  [ ]Abnormal:    PULMONARY  [x]Bilateral Clear Breath Sounds  [ ]Normal Excursion  [ ]Abnormal    GI  [x]PEG J site c/d/I, old peg stoma site with leakage clear fluid [x] +BS		              [x]Soft, nondistended, no guarding or rebound tenderness   [ ]Abnormal    MUSCULOSKELETAL                                   [x]Bedbound                 [ ]Abnormal    [ ]Ambulatory/OOB to chair                           EXTREMITIES                                         [x]Normal  [ ]Edema                           NEUROLOGIC  [x] Normal, non focal  [ ] Focal findings:    PSYCHIATRIC  [x]Alert and appropriate  [ ] Sedated	 [ ]Agitated    :  Mcbride: [ ] Yes, if yes: Date of Placement:                   [x] No    LINES: Central Lines [ ] Yes, if yes: Date of Placement                                     [x] No    HOSPITAL MEDICATIONS:  MEDICATIONS  (STANDING):  acetylcysteine 10%  Inhalation 4 milliLiter(s) Inhalation every 12 hours  albuterol/ipratropium for Nebulization 3 milliLiter(s) Nebulizer every 6 hours  artificial  tears Solution 1 Drop(s) Both EYES every 6 hours  ascorbic acid 500 milliGRAM(s) Oral daily  Biotene Dry Mouth Oral Rinse 5 milliLiter(s) Swish and Spit every 6 hours  calcium carbonate 1250 mG  + Vitamin D (OsCal 500 + D) 1 Tablet(s) Oral <User Schedule>  chlorhexidine 0.12% Liquid 15 milliLiter(s) Oral Mucosa every 12 hours  chlorhexidine 4% Liquid 1 Application(s) Topical daily  dextrose 5%. 1000 milliLiter(s) (100 mL/Hr) IV Continuous <Continuous>  dextrose 5%. 1000 milliLiter(s) (50 mL/Hr) IV Continuous <Continuous>  dextrose 50% Injectable 25 Gram(s) IV Push once  dextrose 50% Injectable 25 Gram(s) IV Push once  dextrose 50% Injectable 12.5 Gram(s) IV Push once  diclofenac sodium 1% Gel 2 Gram(s) Topical every 6 hours  escitalopram 10 milliGRAM(s) Oral <User Schedule>  fentaNYL   Patch  25 MICROgram(s)/Hr 1 Patch Transdermal every 72 hours  ferrous    sulfate Liquid 300 milliGRAM(s) Oral daily  gabapentin Solution 250 milliGRAM(s) Oral <User Schedule>  glucagon  Injectable 1 milliGRAM(s) IntraMuscular once  hemorrhoidal Ointment 1 Application(s) Rectal at bedtime  influenza  Vaccine (HIGH DOSE) 0.5 milliLiter(s) IntraMuscular once  insulin glargine Injectable (LANTUS) 20 Unit(s) SubCutaneous at bedtime  insulin regular  human recombinant 14 Unit(s) SubCutaneous <User Schedule>  lactobacillus acidophilus 1 Tablet(s) Oral <User Schedule>  levothyroxine 125 MICROGram(s) Oral daily  lidocaine   4% Patch 1 Patch Transdermal every 24 hours  melatonin Liquid 12 milliGRAM(s) Oral <User Schedule>  multivitamin/minerals 1 Tablet(s) Oral daily  pantoprazole  Injectable 40 milliGRAM(s) IV Push every 12 hours  prednisoLONE acetate 1% Suspension 1 Drop(s) Both EYES every 6 hours  predniSONE   Tablet 5 milliGRAM(s) Oral <User Schedule>  QUEtiapine 12.5 milliGRAM(s) Oral <User Schedule>  simethicone 160 milliGRAM(s) Chew <User Schedule>  sodium chloride 1 Gram(s) Oral every 12 hours  sodium chloride 0.65% Nasal 1 Spray(s) Both Nostrils every 6 hours  witch hazel Pads 1 Application(s) Topical every 12 hours    MEDICATIONS  (PRN):  calcium carbonate    500 mG (Tums) Chewable 1 Tablet(s) Chew two times a day PRN Gas  diphenhydrAMINE Elixir 12.5 milliGRAM(s) Oral every 6 hours PRN Rash and/or Itching  oxyCODONE    IR 2.5 milliGRAM(s) Oral every 6 hours PRN Severe Pain (7 - 10)      LABS:                        8.5    7.27  )-----------( 128      ( 12 Oct 2024 06:46 )             29.5     10-12    134[L]  |  100  |  9   ----------------------------<  164[H]  3.5   |  22  |  <0.30[L]    Ca    7.8[L]      12 Oct 2024 06:46  Phos  2.7     10-12  Mg     1.5     10-12        Urinalysis Basic - ( 12 Oct 2024 06:46 )    Color: x / Appearance: x / SG: x / pH: x  Gluc: 164 mg/dL / Ketone: x  / Bili: x / Urobili: x   Blood: x / Protein: x / Nitrite: x   Leuk Esterase: x / RBC: x / WBC x   Sq Epi: x / Non Sq Epi: x / Bacteria: x          CAPILLARY BLOOD GLUCOSE    MICROBIOLOGY:     RADIOLOGY:  [ ] Reviewed and interpreted by me    Mode: AC/ CMV (Assist Control/ Continuous Mandatory Ventilation)  RR (machine): 18  TV (machine): 350  FiO2: 30  PEEP: 5  ITime: 1  MAP: 11  PIP: 33

## 2024-10-12 NOTE — PROGRESS NOTE ADULT - SUBJECTIVE AND OBJECTIVE BOX
INTERVAL HPI/OVERNIGHT EVENTS:    Unable to have her G-J tube replaced.  Has ongoing anemia.  No further GI bleed.    MEDICATIONS  (STANDING):  acetylcysteine 10%  Inhalation 4 milliLiter(s) Inhalation every 12 hours  albuterol/ipratropium for Nebulization 3 milliLiter(s) Nebulizer every 6 hours  artificial  tears Solution 1 Drop(s) Both EYES every 6 hours  ascorbic acid 500 milliGRAM(s) Oral daily  Biotene Dry Mouth Oral Rinse 5 milliLiter(s) Swish and Spit every 6 hours  calcium carbonate 1250 mG  + Vitamin D (OsCal 500 + D) 1 Tablet(s) Oral <User Schedule>  chlorhexidine 0.12% Liquid 15 milliLiter(s) Oral Mucosa every 12 hours  chlorhexidine 4% Liquid 1 Application(s) Topical daily  dextrose 5%. 1000 milliLiter(s) (50 mL/Hr) IV Continuous <Continuous>  dextrose 5%. 1000 milliLiter(s) (100 mL/Hr) IV Continuous <Continuous>  dextrose 50% Injectable 25 Gram(s) IV Push once  dextrose 50% Injectable 25 Gram(s) IV Push once  dextrose 50% Injectable 12.5 Gram(s) IV Push once  diclofenac sodium 1% Gel 2 Gram(s) Topical every 6 hours  escitalopram 10 milliGRAM(s) Oral <User Schedule>  fentaNYL   Patch  25 MICROgram(s)/Hr 1 Patch Transdermal every 72 hours  ferrous    sulfate Liquid 300 milliGRAM(s) Oral daily  gabapentin Solution 250 milliGRAM(s) Oral <User Schedule>  glucagon  Injectable 1 milliGRAM(s) IntraMuscular once  hemorrhoidal Ointment 1 Application(s) Rectal at bedtime  influenza  Vaccine (HIGH DOSE) 0.5 milliLiter(s) IntraMuscular once  insulin glargine Injectable (LANTUS) 20 Unit(s) SubCutaneous at bedtime  insulin regular  human recombinant 14 Unit(s) SubCutaneous <User Schedule>  lactobacillus acidophilus 1 Tablet(s) Oral <User Schedule>  levothyroxine 125 MICROGram(s) Oral daily  lidocaine   4% Patch 1 Patch Transdermal every 24 hours  melatonin Liquid 12 milliGRAM(s) Oral <User Schedule>  multivitamin/minerals 1 Tablet(s) Oral daily  pantoprazole  Injectable 40 milliGRAM(s) IV Push every 12 hours  prednisoLONE acetate 1% Suspension 1 Drop(s) Both EYES every 6 hours  predniSONE   Tablet 5 milliGRAM(s) Oral <User Schedule>  QUEtiapine 12.5 milliGRAM(s) Oral <User Schedule>  simethicone 160 milliGRAM(s) Chew <User Schedule>  sodium chloride 1 Gram(s) Oral every 12 hours  sodium chloride 0.65% Nasal 1 Spray(s) Both Nostrils every 6 hours  witch hazel Pads 1 Application(s) Topical every 12 hours    MEDICATIONS  (PRN):  calcium carbonate    500 mG (Tums) Chewable 1 Tablet(s) Chew two times a day PRN Gas  diphenhydrAMINE Elixir 12.5 milliGRAM(s) Oral every 6 hours PRN Rash and/or Itching  oxyCODONE    IR 2.5 milliGRAM(s) Oral every 6 hours PRN Severe Pain (7 - 10)      Allergies    walnut (Unknown)  metronidazole (Rash)  Lyrica (Unknown)  penicillin (Unknown)  Pineapple (Unknown)  Tagamet (Unknown)  Pecan, Filbert, Hazelnut (Unknown)  heparin (Unknown)  Pecans (Unknown)  Hazelnut (Unknown)  pineapple (Unknown)  meropenem (Rash)    Intolerances        Review of Systems:    Vital Signs Last 24 Hrs  T(C): 36.8 (12 Oct 2024 11:00), Max: 36.8 (11 Oct 2024 19:00)  T(F): 98.3 (12 Oct 2024 11:00), Max: 98.3 (11 Oct 2024 19:00)  HR: 90 (12 Oct 2024 12:45) (78 - 105)  BP: 121/57 (12 Oct 2024 12:45) (106/48 - 142/62)  BP(mean): --  RR: 18 (12 Oct 2024 11:00) (18 - 22)  SpO2: 100% (12 Oct 2024 11:00) (97% - 100%)    Parameters below as of 12 Oct 2024 11:00  Patient On (Oxygen Delivery Method): ventilator        PHYSICAL EXAM:  Constitutional: NAD, on trach collar  Skin: No rashes  Lungs clear  Heart RRR  Abd: soft with G-J tube in place.        LABS:                        8.5    7.27  )-----------( 128      ( 12 Oct 2024 06:46 )             29.5     10-12    134[L]  |  100  |  9   ----------------------------<  164[H]  3.5   |  22  |  <0.30[L]    Ca    7.8[L]      12 Oct 2024 06:46  Phos  2.7     10-12  Mg     1.5     10-12        Urinalysis Basic - ( 12 Oct 2024 06:46 )    Color: x / Appearance: x / SG: x / pH: x  Gluc: 164 mg/dL / Ketone: x  / Bili: x / Urobili: x   Blood: x / Protein: x / Nitrite: x   Leuk Esterase: x / RBC: x / WBC x   Sq Epi: x / Non Sq Epi: x / Bacteria: x      LIVER FUNCTIONS - ( 09 Oct 2024 07:10 )  Alb: 2.5 g/dL / Pro: 6.5 g/dL / ALK PHOS: 84 U/L / ALT: 26 U/L / AST: 28 U/L / GGT: x             RADIOLOGY & ADDITIONAL TESTS:

## 2024-10-13 NOTE — PROGRESS NOTE ADULT - TIME BILLING
Pt seen and examined at bedside. Review of patient records, including history, laboratory data, and imaging. Patient evaluation and assessment. Coordination of care. Time excludes teaching or procedures.

## 2024-10-13 NOTE — PROGRESS NOTE ADULT - PROBLEM SELECTOR PLAN 2
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  No focal intra-abdominal/pelvic or subcutaneous collections  - Occult 10/7: Positive  - Monitor CBC / + Active Type and screen  - Transfused on 10/10 with appropriate response in H+H  - No episodes of melena will resume TFs as per GI   - Possible EGD with G-J Tube exchange next week  - GI called for G-J tube leak, NPO for now. GI to follow up

## 2024-10-13 NOTE — PROGRESS NOTE ADULT - ASSESSMENT
Hospitalist Progress Note


Assessment/Plan: 








# severe gastoduodenitis/post-prandial abd pain - bx with significant mast cells


   - cont ppi, BMX


# weakness/deconditioning - slowly improving; more aggressive activity today


   - still unsafe while ambulating


# tachycardia - better after IVF yesterday; will give another 1L NS today


# hypoK - resolved


# severe protein calorie malnutrition


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


   - cont benadryl gtt, BMX pre-prandial, zyrtec


   - tapering pred


# low vit D - ergo started





##


mod risk





Subjective: had a reaction last night


Objective: 


 Vital Signs











Temp Pulse Resp BP Pulse Ox


 


 36.8 C   83   16   103/63   95 


 


 03/13/17 07:15  03/13/17 07:15  03/13/17 07:15  03/13/17 07:15  03/13/17 07:15








 Laboratory Results





 03/09/17 05:20 





 03/13/17 03:25 





 











 03/12/17 03/13/17 03/14/17





 05:59 05:59 05:59


 


Intake Total  1997 


 


Balance  1997 














- Physical Exam


Constitutional: no apparent distress, not in pain


Cardiovascular: no murmur, rub, or gallop, tachycardia, No irregularly irregular

, No diastolic murmur


Respiratory: no respiratory distress, no rales or rhonchi, clear to auscultation





ICD10 Worksheet


Patient Problems: 


 Problems











Problem Status Onset


 


Allergic reaction Acute  


 


Mast cell disease Acute  


 


Anaphylaxis Acute  


 


Hypokalemia Acute  


 


Anorexia Acute  


 


Cachexia Acute  


 


Electrolyte abnormality Acute 75 yo F PMH T2DM on insulin, HTN, HLD, hypothyroidism, RA on Prednisone, Fibromyalgia, cerebral aneurysm s/p repair, chronic hypercapnic respiratory failure s/p trach (vent dependent) and chronic PEG 2022, recurrent C. diff infection (follows with Dr. 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  Pt also noted to have rectal bleeding this past week requiring transfusion 5 days ago in ED as per daughter. Daughter also reports brownish discharge from G-J tube. In ED, pt afebrile, fiO2 96-98% on 40% on ventilator.  Chest X-ray w/ opacification of right lung concerning for possible PNA.  Admitted to RCU for further management.       10/11: No events reported overnight. Patient remains on Cefepime to complete tomorrow. Patient was unable to have EGD / G-J Exchange today due to scheduling conflict. No reports of melena and stable H+H as per d/w GI Can resume feeds. Will reach out to  on 10/13 to confirm if patient will be rescheduled for 10/14 as per [facility] team as it is his private patient.

## 2024-10-13 NOTE — PROGRESS NOTE ADULT - SUBJECTIVE AND OBJECTIVE BOX
Patient is a 74y old  Female who presents with a chief complaint of Patient admitted with Hypoxemia and episode of Bright red blood per rectum (11 Oct 2024 11:15)      Interval Events:    REVIEW OF SYSTEMS:  [ ] Positive  [ ] All other systems negative  [X ] Unable to assess ROS because due to underlying conditions    Vital Signs Last 24 Hrs  T(C): 36.8 (10-12-24 @ 00:05), Max: 36.8 (10-11-24 @ 10:37)  T(F): 98.2 (10-12-24 @ 00:05), Max: 98.3 (10-11-24 @ 10:37)  HR: 86 (10-12-24 @ 06:05) (77 - 105)  BP: 142/62 (10-12-24 @ 00:05) (122/63 - 145/64)  RR: 18 (10-12-24 @ 00:05) (17 - 22)  SpO2: 100% (10-12-24 @ 06:05) (96% - 100%)    PHYSICAL EXAM:  HEENT:   [x]Tracheostomy: #8 distal XLT Shiley   [x]Pupils equal  [x]No oral lesions  [ ]Abnormal    SKIN  [ ]No Rash  [x] Abnormal: B/L buttocks/sacral stage 1-2 pressure injury present on admission   [ ] pressure    CARDIAC  [x]Regular  [ ]Abnormal:    PULMONARY  [x]Bilateral Clear Breath Sounds  [ ]Normal Excursion  [ ]Abnormal    GI  [x]PEG J site c/d/I, old peg stoma site with leakage clear fluid [x] +BS		              [x]Soft, nondistended, no guarding or rebound tenderness   [ ]Abnormal    MUSCULOSKELETAL                                   [x]Bedbound                 [ ]Abnormal    [ ]Ambulatory/OOB to chair                           EXTREMITIES                                         [x]Normal  [ ]Edema                           NEUROLOGIC  [x] Normal, non focal  [ ] Focal findings:    PSYCHIATRIC  [x]Alert and appropriate  [ ] Sedated	 [ ]Agitated    :  Mcbride: [ ] Yes, if yes: Date of Placement:                   [x] No    LINES: Central Lines [ ] Yes, if yes: Date of Placement                                     [x] No    HOSPITAL MEDICATIONS:  MEDICATIONS  (STANDING):  acetylcysteine 10%  Inhalation 4 milliLiter(s) Inhalation every 12 hours  albuterol/ipratropium for Nebulization 3 milliLiter(s) Nebulizer every 6 hours  artificial  tears Solution 1 Drop(s) Both EYES every 6 hours  ascorbic acid 500 milliGRAM(s) Oral daily  Biotene Dry Mouth Oral Rinse 5 milliLiter(s) Swish and Spit every 6 hours  calcium carbonate 1250 mG  + Vitamin D (OsCal 500 + D) 1 Tablet(s) Oral <User Schedule>  chlorhexidine 0.12% Liquid 15 milliLiter(s) Oral Mucosa every 12 hours  chlorhexidine 4% Liquid 1 Application(s) Topical daily  dextrose 5%. 1000 milliLiter(s) (100 mL/Hr) IV Continuous <Continuous>  dextrose 5%. 1000 milliLiter(s) (50 mL/Hr) IV Continuous <Continuous>  dextrose 50% Injectable 25 Gram(s) IV Push once  dextrose 50% Injectable 25 Gram(s) IV Push once  dextrose 50% Injectable 12.5 Gram(s) IV Push once  diclofenac sodium 1% Gel 2 Gram(s) Topical every 6 hours  escitalopram 10 milliGRAM(s) Oral <User Schedule>  fentaNYL   Patch  25 MICROgram(s)/Hr 1 Patch Transdermal every 72 hours  ferrous    sulfate Liquid 300 milliGRAM(s) Oral daily  gabapentin Solution 250 milliGRAM(s) Oral <User Schedule>  glucagon  Injectable 1 milliGRAM(s) IntraMuscular once  hemorrhoidal Ointment 1 Application(s) Rectal at bedtime  influenza  Vaccine (HIGH DOSE) 0.5 milliLiter(s) IntraMuscular once  insulin glargine Injectable (LANTUS) 20 Unit(s) SubCutaneous at bedtime  insulin regular  human recombinant 14 Unit(s) SubCutaneous <User Schedule>  lactobacillus acidophilus 1 Tablet(s) Oral <User Schedule>  levothyroxine 125 MICROGram(s) Oral daily  lidocaine   4% Patch 1 Patch Transdermal every 24 hours  melatonin Liquid 12 milliGRAM(s) Oral <User Schedule>  multivitamin/minerals 1 Tablet(s) Oral daily  pantoprazole  Injectable 40 milliGRAM(s) IV Push every 12 hours  prednisoLONE acetate 1% Suspension 1 Drop(s) Both EYES every 6 hours  predniSONE   Tablet 5 milliGRAM(s) Oral <User Schedule>  QUEtiapine 12.5 milliGRAM(s) Oral <User Schedule>  simethicone 160 milliGRAM(s) Chew <User Schedule>  sodium chloride 1 Gram(s) Oral every 12 hours  sodium chloride 0.65% Nasal 1 Spray(s) Both Nostrils every 6 hours  witch hazel Pads 1 Application(s) Topical every 12 hours    MEDICATIONS  (PRN):  calcium carbonate    500 mG (Tums) Chewable 1 Tablet(s) Chew two times a day PRN Gas  diphenhydrAMINE Elixir 12.5 milliGRAM(s) Oral every 6 hours PRN Rash and/or Itching  oxyCODONE    IR 2.5 milliGRAM(s) Oral every 6 hours PRN Severe Pain (7 - 10)      LABS:                        8.5    7.27  )-----------( 128      ( 12 Oct 2024 06:46 )             29.5     10-12    134[L]  |  100  |  9   ----------------------------<  164[H]  3.5   |  22  |  <0.30[L]    Ca    7.8[L]      12 Oct 2024 06:46  Phos  2.7     10-12  Mg     1.5     10-12        Urinalysis Basic - ( 12 Oct 2024 06:46 )    Color: x / Appearance: x / SG: x / pH: x  Gluc: 164 mg/dL / Ketone: x  / Bili: x / Urobili: x   Blood: x / Protein: x / Nitrite: x   Leuk Esterase: x / RBC: x / WBC x   Sq Epi: x / Non Sq Epi: x / Bacteria: x          CAPILLARY BLOOD GLUCOSE    MICROBIOLOGY:     RADIOLOGY:  [ ] Reviewed and interpreted by me    Mode: AC/ CMV (Assist Control/ Continuous Mandatory Ventilation)  RR (machine): 18  TV (machine): 350  FiO2: 30  PEEP: 5  ITime: 1  MAP: 11  PIP: 33

## 2024-10-13 NOTE — PROGRESS NOTE ADULT - NS ATTEND AMEND GEN_ALL_CORE FT
Pt is a 74F with MHx IDDM2, HTN, HLD, hypothyroidism, RA on Prednisone, fibromyalgia, cerebral aneurysm s/p repair, chronic hypoxemia and hypercapnic respiratory failure, tracheostomy and vent dependent, recurrent C. diff infection, chronic PEG 2022 with persistent GJ tube leaks now s/p GC fistula repair 8/12, recent presentation 5 days PTA for rectal bleeding requiring transfusion in the ED who now presents with acute hypoxemic respiratory distress likely 2/2 RML PNA admitted to the RCU for further management.     1. Neuro - awake and alert, orientated, mouthing words. Family concerned about worsening hearing loss  - appreciate ENT and audiology eval, patient not cooperating with audiogram, follow up as outpatient. h/o anxiety - c/w low dose Seroquel and Lexapro. Family concerned about underdosing home pain medications, pt should be on Fentanyl 37.5mcg but is on 25mcg with oxycodone prn. Pt was requiring prns, increased back to home Fentanyl dose.    2. Pulm - trach and vent dependent, daily PS trials, trach care. Did not do well with PSV trial today. Blood tinged secretions increased today- cont to minimize deep suctioning and use soft tipped catheters. Bronchodilators and Sputum cultures with pseudomonas. NAC held.   3. CVS-HD stable  4. 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 Stoma with chronic leak, positional. No further rectal bleeding. On 10/10, had an acute drop in h/h with an episode of melena overnight per nursing, receiving 1 U PRBCs with appropriate response. No further bleeding. IV PPI added. Feeds restarted today, will continue to uptitrate as tolerated. G-vent and j-feeds as per hospital routine to decrease abdominal distention. GI following, recs appreciated. Plan for replacement of GJ tube along with reattempt at closure of fistula on Monday. NPO Sunday MN.  5. ID - RML pneumonia - last day of Cefepime 10/11. Pt with persistent diarrhea, recheck CDiff. Appreciate ID eval and recs. Sputum culture +PSA.   6. Heme - anemia, thrombocytopenia. intermittent BRBPR from known hemorrhoids, acute anemia 10/10 thought from possible GI bleed after episode of melena ON, s/p 1 u PRBCs with appropriate response. DVT ppx  SCDs. No further bleeding episodes. CBC stable. Will CTM closely.   7. Endo - ISS and BGFS monitoring  8. Rheum - c/w prednisone and chronic pain management  9. Derm - appreciate wound care eval and recs for sacrum and gastric stoma wound  Medically optimized for planned GI procedure on 10/14.  Above discussed with the patient's daughter via telephone and  at bedside, all questions answered.

## 2024-10-14 NOTE — CHART NOTE - NSCHARTNOTEFT_GEN_A_CORE
Patient is s/p GJ exchange with Dr. Rosales 10/14.     [facility] team to sign off. Please reconsult as needed.     Patient will follow up with Dr. Rosales for chronic GI conditions.

## 2024-10-14 NOTE — PROGRESS NOTE ADULT - NS ATTEND AMEND GEN_ALL_CORE FT
Pt is a 74F with MHx IDDM2, HTN, HLD, hypothyroidism, RA on Prednisone, fibromyalgia, cerebral aneurysm s/p repair, chronic hypoxemia and hypercapnic respiratory failure, tracheostomy and vent dependent, recurrent C. diff infection, chronic PEG 2022 with persistent GJ tube leaks now s/p GC fistula repair 8/12, recent presentation 5 days PTA for rectal bleeding requiring transfusion in the ED who now presents with acute hypoxemic respiratory distress likely 2/2 RML PNA admitted to the RCU for further management.       #Neuro - awake, alert, and interactive by mouthing words earlier but was post anesthesia at time of my evaluation  - Was evaluated for hearing loss but unable to cooperate with audiogram - follow-up as outpatient  - History of anxiety and tardive which per daughter was treated and improved with Seroquel and Lexapro but now family noting some of these symptoms (clicking noises and unusual movements again). We discussed medication titration but this may also impact respiratory status  - Was on pain medications but recently family concerned about lower dose compared to home regimen so Fentanyl increased back to 37.5mcg and continue on OxyIR. On my discussion today her daughter, Marysol, noted that she was not aware that pain medications can impact respiratory function  #Cardiovascular - hemodynamically stable  #Pulmonary - chronic hypoxemic and hypercapnic respiratory failure on mechanical ventilation  - Has been vent dependent but daughter note that in March 2023 she was able to come off the vent for some time at [facility]. We discussed that this may not be possible but we will continue PSV trials as able and if SAFE. She is presently post anesthesia and breathing with the vent.  - Has recently had some blood tinged secretions likely due to deep suctioning - will monitor output and minimize suctioning  - Sputum cultures with Pseudomonas  - Continue bronchodilators and airway clearance. Continue trach care. NOt presently on NAC  - Maintain O2 sat > 90%  #Gastro - oropharyngeal tube with GJ tube which was replaced today by GI  - Gastrocutaneous fistula s/p clipping 8/12 but with continued leaking. There was plan for repair today and will await discussion with GI about what they were able to do during endoscopic procedure today. Monitor site and output  - Restart feeds once cleared by  GI  - Patient noted to have anemia of unclear etiology and did receive PRBC during this admission with appropriate response. Has had prior melena which daughter felt was due to hemorrhoids. Follow-up GI regarding EGD findings.  - Continue PPI  - Daughter has noted bloating and the G tube has been vented at times. Will d/w case management whether there are other options to assist with venting on discharge  #Nephro - normal renal function  #Infectious Disease - completed course of antibiotics for bacterial pneumonia (RML).   - ID evaluation noted  - Sacral wound > 2 years. Continue wound care  #Hem/Onc - monitor counts and transfuse as needed  - DVT proph with SCDs given recent bleeding and PRBC transfusion  #Endo - DM2 with hyperglycemia - continue insulin and adjust as needed for goal FS < 200  #Rheum - RA with chronic pain - continue Prednisone and pain management      Long term prognosis guarded. Discussed with patient's daughter, Marysol, over the phone and all questions answered. The patient will ultimately require discharge home and we will follow-up with CM regarding what services are available for patient/family but previously this has been somewhat limited.

## 2024-10-14 NOTE — PROGRESS NOTE ADULT - SUBJECTIVE AND OBJECTIVE BOX
Patient is a 74y old  Female who presents with a chief complaint of Patient admitted with Hypoxemia and episode of Bright red blood per rectum (13 Oct 2024 07:13)      Interval Events:    REVIEW OF SYSTEMS:  [ ] Positive  [ ] All other systems negative  [ ] Unable to assess ROS because ________    Vital Signs Last 24 Hrs  T(C): 37.2 (10-14-24 @ 06:25), Max: 37.2 (10-14-24 @ 06:25)  T(F): 98.9 (10-14-24 @ 06:25), Max: 98.9 (10-14-24 @ 06:25)  HR: 101 (10-14-24 @ 06:25) (78 - 105)  BP: 114/56 (10-14-24 @ 06:25) (114/56 - 139/66)  RR: 18 (10-14-24 @ 06:25) (16 - 18)  SpO2: 99% (10-14-24 @ 06:25) (98% - 100%)    PHYSICAL EXAM:  HEENT:   [ ]Tracheostomy:  [ ]Pupils equal  [ ]No oral lesions  [ ]Abnormal    SKIN  [ ]No Rash  [ ] Abnormal  [ ] pressure    CARDIAC  [ ]Regular  [ ]Abnormal    PULMONARY  [ ]Bilateral Clear Breath Sounds  [ ]Normal Excursion  [ ]Abnormal    GI  [ ]PEG      [ ] +BS		              [ ]Soft, nondistended, nontender	  [ ]Abnormal    MUSCULOSKELETAL                                   [ ]Bedbound                 [ ]Abnormal    [ ]Ambulatory/OOB to chair                           EXTREMITIES                                         [ ]Normal  [ ]Edema                           NEUROLOGIC  [ ] Normal, non focal  [ ] Focal findings:    PSYCHIATRIC  [ ]Alert and appropriate  [ ] Sedated	 [ ]Agitated    :  Mcbride: [ ] Yes, if yes: Date of Placement:                   [  ] No    LINES: Central Lines [ ] Yes, if yes: Date of Placement                                     [  ] No    HOSPITAL MEDICATIONS:  MEDICATIONS  (STANDING):  albuterol/ipratropium for Nebulization 3 milliLiter(s) Nebulizer every 6 hours  artificial  tears Solution 1 Drop(s) Both EYES every 6 hours  ascorbic acid 500 milliGRAM(s) Oral daily  Biotene Dry Mouth Oral Rinse 5 milliLiter(s) Swish and Spit every 6 hours  calcium carbonate 1250 mG  + Vitamin D (OsCal 500 + D) 1 Tablet(s) Oral <User Schedule>  chlorhexidine 0.12% Liquid 15 milliLiter(s) Oral Mucosa every 12 hours  chlorhexidine 4% Liquid 1 Application(s) Topical daily  dextrose 5% + lactated ringers. 1000 milliLiter(s) (50 mL/Hr) IV Continuous <Continuous>  dextrose 5%. 1000 milliLiter(s) (100 mL/Hr) IV Continuous <Continuous>  dextrose 5%. 1000 milliLiter(s) (50 mL/Hr) IV Continuous <Continuous>  dextrose 50% Injectable 25 Gram(s) IV Push once  dextrose 50% Injectable 25 Gram(s) IV Push once  dextrose 50% Injectable 12.5 Gram(s) IV Push once  dextrose Oral Gel 15 Gram(s) Oral once  diclofenac sodium 1% Gel 2 Gram(s) Topical every 6 hours  escitalopram 10 milliGRAM(s) Oral <User Schedule>  fentaNYL   Patch  12 MICROgram(s)/Hr 1 Patch Transdermal every 72 hours  fentaNYL   Patch  25 MICROgram(s)/Hr 1 Patch Transdermal every 72 hours  ferrous    sulfate Liquid 300 milliGRAM(s) Oral daily  gabapentin Solution 250 milliGRAM(s) Oral <User Schedule>  glucagon  Injectable 1 milliGRAM(s) IntraMuscular once  hemorrhoidal Ointment 1 Application(s) Rectal at bedtime  influenza  Vaccine (HIGH DOSE) 0.5 milliLiter(s) IntraMuscular once  insulin glargine Injectable (LANTUS) 20 Unit(s) SubCutaneous at bedtime  insulin lispro (ADMELOG) corrective regimen sliding scale   SubCutaneous every 4 hours  insulin regular  human recombinant 14 Unit(s) SubCutaneous <User Schedule>  lactobacillus acidophilus 1 Tablet(s) Oral <User Schedule>  levothyroxine 125 MICROGram(s) Oral daily  lidocaine   4% Patch 1 Patch Transdermal every 24 hours  melatonin Liquid 12 milliGRAM(s) Oral <User Schedule>  multivitamin/minerals 1 Tablet(s) Oral daily  pantoprazole  Injectable 40 milliGRAM(s) IV Push every 12 hours  prednisoLONE acetate 1% Suspension 1 Drop(s) Both EYES every 6 hours  predniSONE   Tablet 5 milliGRAM(s) Oral <User Schedule>  QUEtiapine 12.5 milliGRAM(s) Oral <User Schedule>  simethicone 160 milliGRAM(s) Chew <User Schedule>  sodium chloride 1 Gram(s) Oral every 12 hours  sodium chloride 0.65% Nasal 1 Spray(s) Both Nostrils every 6 hours  witch hazel Pads 1 Application(s) Topical every 12 hours    MEDICATIONS  (PRN):  calcium carbonate    500 mG (Tums) Chewable 1 Tablet(s) Chew two times a day PRN Gas  diphenhydrAMINE Elixir 12.5 milliGRAM(s) Oral every 6 hours PRN Rash and/or Itching  oxyCODONE    IR 2.5 milliGRAM(s) Oral every 6 hours PRN Severe Pain (7 - 10)      LABS:                        8.5    9.05  )-----------( 130      ( 14 Oct 2024 05:08 )             29.2     10-14    138  |  104  |  10  ----------------------------<  204[H]  4.6   |  25  |  <0.30[L]    Ca    7.7[L]      14 Oct 2024 05:08  Phos  3.5     10-14  Mg     2.2     10-14        Urinalysis Basic - ( 14 Oct 2024 05:08 )    Color: x / Appearance: x / SG: x / pH: x  Gluc: 204 mg/dL / Ketone: x  / Bili: x / Urobili: x   Blood: x / Protein: x / Nitrite: x   Leuk Esterase: x / RBC: x / WBC x   Sq Epi: x / Non Sq Epi: x / Bacteria: x          CAPILLARY BLOOD GLUCOSE    MICROBIOLOGY:     RADIOLOGY:  [ ] Reviewed and interpreted by me    Mode: AC/ CMV (Assist Control/ Continuous Mandatory Ventilation)  RR (machine): 18  TV (machine): 350  FiO2: 40  PEEP: 5  ITime: 1  MAP: 11  PIP: 31   Patient is a 74y old  Female who presents with a chief complaint of Patient admitted with Hypoxemia and episode of Bright red blood per rectum (13 Oct 2024 07:13)      Interval Events: No events reported overnight     REVIEW OF SYSTEMS:  [ ] Positive  [x] All other systems negative  [ ] Unable to assess ROS because ________    Vital Signs Last 24 Hrs  T(C): 37.2 (10-14-24 @ 06:25), Max: 37.2 (10-14-24 @ 06:25)  T(F): 98.9 (10-14-24 @ 06:25), Max: 98.9 (10-14-24 @ 06:25)  HR: 101 (10-14-24 @ 06:25) (78 - 105)  BP: 114/56 (10-14-24 @ 06:25) (114/56 - 139/66)  RR: 18 (10-14-24 @ 06:25) (16 - 18)  SpO2: 99% (10-14-24 @ 06:25) (98% - 100%)    PHYSICAL EXAM:  HEENT:   [x]Tracheostomy: #8 distal XLT [place]   [x]Pupils equal  [x]No oral lesions  [ ]Abnormal    SKIN  [ ]No Rash  [x] Abnormal: B/L buttocks/sacral stage 1-2 pressure injury present on admission   [ ] pressure    CARDIAC  [x]Regular  [ ]Abnormal:    PULMONARY  [x]Bilateral Clear Breath Sounds  [ ]Normal Excursion  [ ]Abnormal    GI  [x]PEG J site c/d/I, old peg stoma site with leakage clear fluid [x] +BS		              [x]Soft, nondistended, no guarding or rebound tenderness   [ ]Abnormal    MUSCULOSKELETAL                                   [x]Bedbound                 [ ]Abnormal    [ ]Ambulatory/OOB to chair                           EXTREMITIES                                         [x]Normal  [ ]Edema                           NEUROLOGIC  [x] Normal, non focal  [ ] Focal findings:    PSYCHIATRIC  [x]Alert and appropriate  [ ] Sedated	 [ ]Agitated    :  Reed: [ ] Yes, if yes: Date of Placement:                   [x] No        HOSPITAL MEDICATIONS:  MEDICATIONS  (STANDING):  albuterol/ipratropium for Nebulization 3 milliLiter(s) Nebulizer every 6 hours  artificial  tears Solution 1 Drop(s) Both EYES every 6 hours  ascorbic acid 500 milliGRAM(s) Oral daily  Biotene Dry Mouth Oral Rinse 5 milliLiter(s) Swish and Spit every 6 hours  calcium carbonate 1250 mG  + Vitamin D (OsCal 500 + D) 1 Tablet(s) Oral <User Schedule>  chlorhexidine 0.12% Liquid 15 milliLiter(s) Oral Mucosa every 12 hours  chlorhexidine 4% Liquid 1 Application(s) Topical daily  dextrose 5% + lactated ringers. 1000 milliLiter(s) (50 mL/Hr) IV Continuous <Continuous>  dextrose 5%. 1000 milliLiter(s) (100 mL/Hr) IV Continuous <Continuous>  dextrose 5%. 1000 milliLiter(s) (50 mL/Hr) IV Continuous <Continuous>  dextrose 50% Injectable 25 Gram(s) IV Push once  dextrose 50% Injectable 25 Gram(s) IV Push once  dextrose 50% Injectable 12.5 Gram(s) IV Push once  dextrose Oral Gel 15 Gram(s) Oral once  diclofenac sodium 1% Gel 2 Gram(s) Topical every 6 hours  escitalopram 10 milliGRAM(s) Oral <User Schedule>  fentaNYL   Patch  12 MICROgram(s)/Hr 1 Patch Transdermal every 72 hours  fentaNYL   Patch  25 MICROgram(s)/Hr 1 Patch Transdermal every 72 hours  ferrous    sulfate Liquid 300 milliGRAM(s) Oral daily  gabapentin Solution 250 milliGRAM(s) Oral <User Schedule>  glucagon  Injectable 1 milliGRAM(s) IntraMuscular once  hemorrhoidal Ointment 1 Application(s) Rectal at bedtime  influenza  Vaccine (HIGH DOSE) 0.5 milliLiter(s) IntraMuscular once  insulin glargine Injectable (LANTUS) 20 Unit(s) SubCutaneous at bedtime  insulin lispro (ADMELOG) corrective regimen sliding scale   SubCutaneous every 4 hours  insulin regular  human recombinant 14 Unit(s) SubCutaneous <User Schedule>  lactobacillus acidophilus 1 Tablet(s) Oral <User Schedule>  levothyroxine 125 MICROGram(s) Oral daily  lidocaine   4% Patch 1 Patch Transdermal every 24 hours  melatonin Liquid 12 milliGRAM(s) Oral <User Schedule>  multivitamin/minerals 1 Tablet(s) Oral daily  pantoprazole  Injectable 40 milliGRAM(s) IV Push every 12 hours  prednisoLONE acetate 1% Suspension 1 Drop(s) Both EYES every 6 hours  predniSONE   Tablet 5 milliGRAM(s) Oral <User Schedule>  QUEtiapine 12.5 milliGRAM(s) Oral <User Schedule>  simethicone 160 milliGRAM(s) Chew <User Schedule>  sodium chloride 1 Gram(s) Oral every 12 hours  sodium chloride 0.65% Nasal 1 Spray(s) Both Nostrils every 6 hours  witch hazel Pads 1 Application(s) Topical every 12 hours    MEDICATIONS  (PRN):  calcium carbonate    500 mG (Tums) Chewable 1 Tablet(s) Chew two times a day PRN Gas  diphenhydrAMINE Elixir 12.5 milliGRAM(s) Oral every 6 hours PRN Rash and/or Itching  oxyCODONE    IR 2.5 milliGRAM(s) Oral every 6 hours PRN Severe Pain (7 - 10)      LABS:                        8.5    9.05  )-----------( 130      ( 14 Oct 2024 05:08 )             29.2     10-14    138  |  104  |  10  ----------------------------<  204[H]  4.6   |  25  |  <0.30[L]    Ca    7.7[L]      14 Oct 2024 05:08  Phos  3.5     10-14  Mg     2.2     10-14        Urinalysis Basic - ( 14 Oct 2024 05:08 )    Color: x / Appearance: x / SG: x / pH: x  Gluc: 204 mg/dL / Ketone: x  / Bili: x / Urobili: x   Blood: x / Protein: x / Nitrite: x   Leuk Esterase: x / RBC: x / WBC x   Sq Epi: x / Non Sq Epi: x / Bacteria: x          CAPILLARY BLOOD GLUCOSE    MICROBIOLOGY:     RADIOLOGY:  [ ] Reviewed and interpreted by me    Mode: AC/ CMV (Assist Control/ Continuous Mandatory Ventilation)  RR (machine): 18  TV (machine): 350  FiO2: 40  PEEP: 5  ITime: 1  MAP: 11  PIP: 31

## 2024-10-14 NOTE — PROGRESS NOTE ADULT - TIME BILLING
- preparing to see the patient (eg, review of tests, documents, and imaging)  - performing a medically appropriate evaluation and examination  - speaking with patient and/or family as appropriate  - referring and communicating with other health care professionals  - documenting clinical information in the electronic or other health record  - care coordination.    time spent does not include teaching services - preparing to see the patient (eg, review of tests, documents, and imaging)  - performing a medically appropriate evaluation and examination  - speaking with patient and/or family as appropriate  - referring and communicating with other health care professionals  - documenting clinical information in the electronic or other health record  - care coordination.    time spent does not include teaching services or ventilator management.    Patient continues to require mechanical ventilation

## 2024-10-14 NOTE — PROGRESS NOTE ADULT - PROBLEM SELECTOR PLAN 2
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  No focal intra-abdominal/pelvic or subcutaneous collections  - Occult 10/7: Positive  - Monitor CBC / + Active Type and screen  - Transfused on 10/10 with appropriate response in H+H

## 2024-10-14 NOTE — CHART NOTE - NSCHARTNOTEFT_GEN_A_CORE
NUTRITION FOLLOW UP NOTE    PATIENT SEEN FOR: follow up    SOURCE: [] Patient  [x] Current Medical Record  [x] PA  [] Family/support person at bedside  [x] Patient unavailable/inappropriate  [] Other:    CHART REVIEWED/EVENTS NOTED.  [] No changes to nutrition care plan to note  [x] Nutrition Status:  - hx DM, hypothyroidism, RA, trach/GJ per chart  - admitted w/ respiratory distress, hypoxia per chart  - received antibiotic regimen for PSA per chart    DIET ORDER:   Diet, NPO with Tube Feed:   Tube Feeding Modality: Jejunostomy  [email or web address] Peptide 1.5 (KFPEPT1.5RTH)  Total Volume for 24 Hours (mL): 1440  Continuous  Until Goal Tube Feed Rate (mL per Hour): 80  Tube Feed Duration (in Hours): 18  Tube Feed Start Time: 20:00   Rate (mL per Hour): 70   Frequency: Every Hour  Free Water Flush Instructions:  20 cc q 1 hr to prevent j-tube from clogging  Alfred(7 Gm Arginine/7 Gm Glut/1.2 Gm HMB     Qty per Day:  2  No Carb Prosource (1pkg = 15gms Protein)     Qty per Day:  1  Banatrol TF     Qty per Day:  1/2 packet 2 x daily (10-12-24)    CURRENT DIET ORDER IS:  [] Appropriate:  [] Inadequate:  [x] Other: see recommendations below    NUTRITION INTAKE/PROVISION:  [] PO:  [x] Enteral Nutrition:  - patient NPO during today and at endoscopy for GJ exchange due to intermittent clogging and tube discoloration per MD  - patient received ~35% enteral nutrition volume on average over the past 5 days per review of I/Os w/ TFs intermittently held per review of I/Os  - current ordered enteral nutrition regimen will provide in excess of 100% estimated caloric and protein needs; will recommend revision to regimen  [] Parenteral Nutrition:    ANTHROPOMETRICS:  Drug Dosing Weight  Height (cm): 149.9 (14 Oct 2024 10:15)  Weight (kg): 54.4 (14 Oct 2024 10:15)  BMI (kg/m2): 24.2 (14 Oct 2024 10:15)  Weights:   10/14: 54.4kg  10/9: 61kg  - overall weight trend w/ -6.6kg in 5 days (? accuracy); patient did have inadequate delivery of TFs during time period; patient had +1 generalized edema on 10/9 and again today 10/14 for current BW, will continue to follow weight trend    MEDICATIONS:  MEDICATIONS  (STANDING):  albuterol/ipratropium for Nebulization 3 milliLiter(s) Nebulizer every 6 hours  artificial  tears Solution 1 Drop(s) Both EYES every 6 hours  ascorbic acid 500 milliGRAM(s) Oral daily  Biotene Dry Mouth Oral Rinse 5 milliLiter(s) Swish and Spit every 6 hours  calcium carbonate 1250 mG  + Vitamin D (OsCal 500 + D) 1 Tablet(s) Oral <User Schedule>  chlorhexidine 0.12% Liquid 15 milliLiter(s) Oral Mucosa every 12 hours  chlorhexidine 4% Liquid 1 Application(s) Topical daily  dextrose 5%. 1000 milliLiter(s) (100 mL/Hr) IV Continuous <Continuous>  dextrose 5%. 1000 milliLiter(s) (50 mL/Hr) IV Continuous <Continuous>  dextrose 50% Injectable 12.5 Gram(s) IV Push once  dextrose 50% Injectable 25 Gram(s) IV Push once  dextrose 50% Injectable 25 Gram(s) IV Push once  dextrose Oral Gel 15 Gram(s) Oral once  diclofenac sodium 1% Gel 2 Gram(s) Topical every 6 hours  escitalopram 10 milliGRAM(s) Oral <User Schedule>  fentaNYL   Patch  12 MICROgram(s)/Hr 1 Patch Transdermal every 72 hours  fentaNYL   Patch  25 MICROgram(s)/Hr 1 Patch Transdermal every 72 hours  ferrous    sulfate Liquid 300 milliGRAM(s) Oral daily  gabapentin Solution 250 milliGRAM(s) Oral <User Schedule>  glucagon  Injectable 1 milliGRAM(s) IntraMuscular once  hemorrhoidal Ointment 1 Application(s) Rectal at bedtime  influenza  Vaccine (HIGH DOSE) 0.5 milliLiter(s) IntraMuscular once  insulin glargine Injectable (LANTUS) 20 Unit(s) SubCutaneous at bedtime  insulin lispro (ADMELOG) corrective regimen sliding scale   SubCutaneous every 6 hours  insulin regular  human recombinant 14 Unit(s) SubCutaneous <User Schedule>  lactobacillus acidophilus 1 Tablet(s) Oral <User Schedule>  levothyroxine 125 MICROGram(s) Oral daily  lidocaine   4% Patch 1 Patch Transdermal every 24 hours  melatonin Liquid 12 milliGRAM(s) Oral <User Schedule>  multivitamin/minerals 1 Tablet(s) Oral daily  pantoprazole  Injectable 40 milliGRAM(s) IV Push every 12 hours  prednisoLONE acetate 1% Suspension 1 Drop(s) Both EYES every 6 hours  predniSONE   Tablet 5 milliGRAM(s) Oral <User Schedule>  QUEtiapine 12.5 milliGRAM(s) Oral <User Schedule>  simethicone 160 milliGRAM(s) Chew <User Schedule>  sodium chloride 1 Gram(s) Oral every 12 hours  sodium chloride 0.65% Nasal 1 Spray(s) Both Nostrils every 6 hours  witch hazel Pads 1 Application(s) Topical every 12 hours    MEDICATIONS  (PRN):  calcium carbonate    500 mG (Tums) Chewable 1 Tablet(s) Chew two times a day PRN Gas  diphenhydrAMINE Elixir 12.5 milliGRAM(s) Oral every 6 hours PRN Rash and/or Itching  oxyCODONE    IR 2.5 milliGRAM(s) Oral every 6 hours PRN Severe Pain (7 - 10)    NUTRITIONALLY PERTINENT LABS:  10-14 Na138 mmol/L Glu 204 mg/dL[H] K+ 4.6 mmol/L Cr  <0.30 mg/dL[L] BUN 10 mg/dL 10-14 Phos 3.5 mg/dL 10-09 Alb 2.5 g/dL[L]10-09 ALT 26 U/L AST 28 U/L Alkaline Phosphatase 84 U/L  10-14-24 @ 05:08 a1c 5.8    A1C with Estimated Average Glucose Result: 5.8 % (10-14-24 @ 05:08)  A1C with Estimated Average Glucose Result: 6.4 % (08-12-24 @ 07:32)    Finger Sticks:  POCT Blood Glucose.: 176 mg/dL (10-14 @ 14:39)  POCT Blood Glucose.: 188 mg/dL (10-14 @ 06:28)  POCT Blood Glucose.: 249 mg/dL (10-14 @ 01:59)  POCT Blood Glucose.: 327 mg/dL (10-13 @ 21:19)  POCT Blood Glucose.: 413 mg/dL (10-13 @ 17:18)    NUTRITIONALLY PERTINENT MEDICATIONS/LABS:  [x] Reviewed  [x] Relevant notes on medications/labs:  - prior hyponatremia; WNL as of today  - prior hypokalemia & hypophosphatemia 10/13, both WNL as of today  - prior hypomagnesemia last 10/12, WNL as of 10/13  - BG trend w/ erratic readings from mid 100s to >400s; lantus, humulin, admelog ordered, has been on steroid regimen w/ prednisone since 10/7 which can also influence blood glucose trend  - ordered for vitamin C, calcium + vitamin D, synthroid via J tube, multivitamin, sodium chloride tablets    EDEMA:  [x] Reviewed  [x] Relevant notes:  - currently w/ +1 generalized edema (same as initial assessment per chart, has had a 6.6kg weight loss in 5 days between weights)    GI/ I&O:  [x] Reviewed  [] Relevant notes:  [] Other:    SKIN:   [] No pressure injuries documented, per nursing flowsheet  [x] Pressure injury previously noted: 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 Diet:  - recommend revising TF regimen to be:  - TFs of [email or web address] Peptide 1.5 w/ goal rate of 45cc/hr x22 hours (990cc total volume) via J port of GJ  - 22 hour TF regimen allows for synthroid administration  - combined w/ supplements as ordered, combined regimen provides 1835kcal, 93g protein, 693cc free H2O daily (34kcal/kg, 1.7g/pro/kg based on BW 54.4kg)  - fluid management per team  2. Supplements:  - as medically feasible, continue Prosource TF Free (ordered as No Carb Prosource in SCM) 1x/day, Banatrol 1x/day, Alfred 2x/day  3. Multivitamin/mineral supplementation:  - as medically feasible, continue multivitamin, vitamin C, calcium + vitamin D as ordered by team    [] Achieved - Continue current nutrition intervention(s)  [] Current medical condition precludes nutrition intervention at this time.    MONITORING AND EVALUATION:   RD remains available upon request and will follow up per protocol.    Jean Carlos uRshing, YOVANI, CDN - contact on TEAMS. NUTRITION FOLLOW UP NOTE    PATIENT SEEN FOR: follow up    SOURCE: [] Patient  [x] Current Medical Record  [x] PA  [x] Family/support person at bedside  [x] Patient unavailable/inappropriate  [] Other:    CHART REVIEWED/EVENTS NOTED.  [] No changes to nutrition care plan to note  [x] Nutrition Status:  - hx DM, hypothyroidism, RA, trach/GJ per chart  - admitted w/ respiratory distress, hypoxia per chart  - received antibiotic regimen for PSA per chart    DIET ORDER:   Diet, NPO with Tube Feed:   Tube Feeding Modality: Jejunostomy  STEERads Peptide 1.5 (KFPEPT1.5RTH)  Total Volume for 24 Hours (mL): 1440  Continuous  Until Goal Tube Feed Rate (mL per Hour): 80  Tube Feed Duration (in Hours): 18  Tube Feed Start Time: 20:00   Rate (mL per Hour): 70   Frequency: Every Hour  Free Water Flush Instructions:  20 cc q 1 hr to prevent j-tube from clogging  Alfred(7 Gm Arginine/7 Gm Glut/1.2 Gm HMB     Qty per Day:  2  No Carb Prosource (1pkg = 15gms Protein)     Qty per Day:  1  Banatrol TF     Qty per Day:  1/2 packet 2 x daily (10-12-24)    CURRENT DIET ORDER IS:  [] Appropriate:  [] Inadequate:  [x] Other: see recommendations below    NUTRITION INTAKE/PROVISION:  [] PO:  [x] Enteral Nutrition:  - patient NPO during today and at endoscopy for GJ exchange due to intermittent clogging and tube discoloration per MD  - patient received ~35% enteral nutrition volume on average over the past 5 days per review of I/Os w/ TFs intermittently held per review of I/Os  - current ordered enteral nutrition regimen will provide in excess of 100% estimated caloric and protein needs; will recommend revision to regimen  [] Parenteral Nutrition:    ANTHROPOMETRICS:  Drug Dosing Weight  Height (cm): 149.9 (14 Oct 2024 10:15)  Weight (kg): 54.4 (14 Oct 2024 10:15)  BMI (kg/m2): 24.2 (14 Oct 2024 10:15)  Weights:   10/14: 54.4kg  10/9: 61kg  - overall weight trend w/ -6.6kg in 5 days (? accuracy); patient did have inadequate delivery of TFs during time period; patient had +1 generalized edema on 10/9 and again today 10/14 for current BW, will continue to follow weight trend    MEDICATIONS:  MEDICATIONS  (STANDING):  albuterol/ipratropium for Nebulization 3 milliLiter(s) Nebulizer every 6 hours  artificial  tears Solution 1 Drop(s) Both EYES every 6 hours  ascorbic acid 500 milliGRAM(s) Oral daily  Biotene Dry Mouth Oral Rinse 5 milliLiter(s) Swish and Spit every 6 hours  calcium carbonate 1250 mG  + Vitamin D (OsCal 500 + D) 1 Tablet(s) Oral <User Schedule>  chlorhexidine 0.12% Liquid 15 milliLiter(s) Oral Mucosa every 12 hours  chlorhexidine 4% Liquid 1 Application(s) Topical daily  dextrose 5%. 1000 milliLiter(s) (100 mL/Hr) IV Continuous <Continuous>  dextrose 5%. 1000 milliLiter(s) (50 mL/Hr) IV Continuous <Continuous>  dextrose 50% Injectable 12.5 Gram(s) IV Push once  dextrose 50% Injectable 25 Gram(s) IV Push once  dextrose 50% Injectable 25 Gram(s) IV Push once  dextrose Oral Gel 15 Gram(s) Oral once  diclofenac sodium 1% Gel 2 Gram(s) Topical every 6 hours  escitalopram 10 milliGRAM(s) Oral <User Schedule>  fentaNYL   Patch  12 MICROgram(s)/Hr 1 Patch Transdermal every 72 hours  fentaNYL   Patch  25 MICROgram(s)/Hr 1 Patch Transdermal every 72 hours  ferrous    sulfate Liquid 300 milliGRAM(s) Oral daily  gabapentin Solution 250 milliGRAM(s) Oral <User Schedule>  glucagon  Injectable 1 milliGRAM(s) IntraMuscular once  hemorrhoidal Ointment 1 Application(s) Rectal at bedtime  influenza  Vaccine (HIGH DOSE) 0.5 milliLiter(s) IntraMuscular once  insulin glargine Injectable (LANTUS) 20 Unit(s) SubCutaneous at bedtime  insulin lispro (ADMELOG) corrective regimen sliding scale   SubCutaneous every 6 hours  insulin regular  human recombinant 14 Unit(s) SubCutaneous <User Schedule>  lactobacillus acidophilus 1 Tablet(s) Oral <User Schedule>  levothyroxine 125 MICROGram(s) Oral daily  lidocaine   4% Patch 1 Patch Transdermal every 24 hours  melatonin Liquid 12 milliGRAM(s) Oral <User Schedule>  multivitamin/minerals 1 Tablet(s) Oral daily  pantoprazole  Injectable 40 milliGRAM(s) IV Push every 12 hours  prednisoLONE acetate 1% Suspension 1 Drop(s) Both EYES every 6 hours  predniSONE   Tablet 5 milliGRAM(s) Oral <User Schedule>  QUEtiapine 12.5 milliGRAM(s) Oral <User Schedule>  simethicone 160 milliGRAM(s) Chew <User Schedule>  sodium chloride 1 Gram(s) Oral every 12 hours  sodium chloride 0.65% Nasal 1 Spray(s) Both Nostrils every 6 hours  witch hazel Pads 1 Application(s) Topical every 12 hours    MEDICATIONS  (PRN):  calcium carbonate    500 mG (Tums) Chewable 1 Tablet(s) Chew two times a day PRN Gas  diphenhydrAMINE Elixir 12.5 milliGRAM(s) Oral every 6 hours PRN Rash and/or Itching  oxyCODONE    IR 2.5 milliGRAM(s) Oral every 6 hours PRN Severe Pain (7 - 10)    NUTRITIONALLY PERTINENT LABS:  10-14 Na138 mmol/L Glu 204 mg/dL[H] K+ 4.6 mmol/L Cr  <0.30 mg/dL[L] BUN 10 mg/dL 10-14 Phos 3.5 mg/dL 10-09 Alb 2.5 g/dL[L]10-09 ALT 26 U/L AST 28 U/L Alkaline Phosphatase 84 U/L  10-14-24 @ 05:08 a1c 5.8    A1C with Estimated Average Glucose Result: 5.8 % (10-14-24 @ 05:08)  A1C with Estimated Average Glucose Result: 6.4 % (08-12-24 @ 07:32)    Finger Sticks:  POCT Blood Glucose.: 176 mg/dL (10-14 @ 14:39)  POCT Blood Glucose.: 188 mg/dL (10-14 @ 06:28)  POCT Blood Glucose.: 249 mg/dL (10-14 @ 01:59)  POCT Blood Glucose.: 327 mg/dL (10-13 @ 21:19)  POCT Blood Glucose.: 413 mg/dL (10-13 @ 17:18)    NUTRITIONALLY PERTINENT MEDICATIONS/LABS:  [x] Reviewed  [x] Relevant notes on medications/labs:  - prior hyponatremia; WNL as of today  - prior hypokalemia & hypophosphatemia 10/13, both WNL as of today  - prior hypomagnesemia last 10/12, WNL as of 10/13  - BG trend w/ erratic readings from mid 100s to >400s; lantus, humulin, admelog ordered, has been on steroid regimen w/ prednisone since 10/7 which can also influence blood glucose trend  - ordered for vitamin C, calcium + vitamin D, synthroid via J tube, multivitamin, sodium chloride tablets    EDEMA:  [x] Reviewed  [x] Relevant notes:  - currently w/ +1 generalized edema (same as initial assessment per chart, has had a 6.6kg weight loss in 5 days between weights)    GI/ I&O:  [x] Reviewed  [] Relevant notes:  [] Other:    SKIN:   [] No pressure injuries documented, per nursing flowsheet  [x] Pressure injury previously noted: 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 Diet:  - recommend revising TF regimen to be:  - TFs of STEERads Peptide 1.5 w/ goal rate of 45cc/hr x22 hours (990cc total volume) via J port of GJ  - 22 hour TF regimen allows for synthroid administration  - combined w/ supplements as ordered, combined regimen provides 1835kcal, 93g protein, 693cc free H2O daily (34kcal/kg, 1.7g/pro/kg based on BW 54.4kg)  - fluid management per team  2. Supplements:  - as medically feasible, continue Prosource TF Free (ordered as No Carb Prosource in SCM) 1x/day, Banatrol 1x/day, Alfred 2x/day  3. Multivitamin/mineral supplementation:  - as medically feasible, continue multivitamin, vitamin C, calcium + vitamin D as ordered by team    [] Achieved - Continue current nutrition intervention(s)  [] Current medical condition precludes nutrition intervention at this time.    MONITORING AND EVALUATION:   RD remains available upon request and will follow up per protocol.    Jean Carlos Rushing, YOVANI, CDN - contact on TEAMS. NUTRITION FOLLOW UP NOTE    PATIENT SEEN FOR: follow up    SOURCE: [] Patient  [x] Current Medical Record  [x] PA  [x] Family/support person at bedside  [x] Patient unavailable/inappropriate  [] Other:    CHART REVIEWED/EVENTS NOTED.  [] No changes to nutrition care plan to note  [x] Nutrition Status:  - hx DM, hypothyroidism, RA, trach/GJ per chart  - admitted w/ respiratory distress, hypoxia per chart  - received antibiotic regimen for PSA per chart    DIET ORDER:   Diet, NPO with Tube Feed:   Tube Feeding Modality: Jejunostomy  Logan Peptide 1.5 (KFPEPT1.5RTH)  Total Volume for 24 Hours (mL): 1440  Continuous  Until Goal Tube Feed Rate (mL per Hour): 80  Tube Feed Duration (in Hours): 18  Tube Feed Start Time: 20:00   Rate (mL per Hour): 70   Frequency: Every Hour  Free Water Flush Instructions:  20 cc q 1 hr to prevent j-tube from clogging  Alfred(7 Gm Arginine/7 Gm Glut/1.2 Gm HMB     Qty per Day:  2  No Carb Prosource (1pkg = 15gms Protein)     Qty per Day:  1  Banatrol TF     Qty per Day:  1/2 packet 2 x daily (10-12-24)    CURRENT DIET ORDER IS:  [] Appropriate:  [] Inadequate:  [x] Other: see recommendations below    NUTRITION INTAKE/PROVISION:  [] PO:  [x] Enteral Nutrition:  - patient NPO during today and at endoscopy for GJ exchange due to intermittent clogging and tube discoloration per MD  - patient received ~35% enteral nutrition volume on average over the past 5 days per review of I/Os w/ TFs intermittently held per review of I/Os  - per d/w patient's daughter patient had overall Logan Peptide 1.5 TF regimen PTA accounted for with endocrine for insulin regimen atop of other supplements that are currently ordered as regimen for the patient; providers visiting during encounter, to address further recommendations upon f/u as current regimen exceeds ~120% estimated nutritional needs  - patient's daughter had questions for multivitamin/mineral orders to be in liquid forms if medically feasible, reviewed w/ PA  [] Parenteral Nutrition:    ANTHROPOMETRICS:  Drug Dosing Weight  Height (cm): 149.9 (14 Oct 2024 10:15)  Weight (kg): 54.4 (14 Oct 2024 10:15)  BMI (kg/m2): 24.2 (14 Oct 2024 10:15)  Weights:   10/14: 54.4kg  10/9: 61kg  - overall weight trend w/ -6.6kg in 5 days (? accuracy); patient did have inadequate delivery of TFs during time period; patient had +1 generalized edema on 10/9 and again today 10/14 for current BW, will continue to follow weight trend    MEDICATIONS:  MEDICATIONS  (STANDING):  albuterol/ipratropium for Nebulization 3 milliLiter(s) Nebulizer every 6 hours  artificial  tears Solution 1 Drop(s) Both EYES every 6 hours  ascorbic acid 500 milliGRAM(s) Oral daily  Biotene Dry Mouth Oral Rinse 5 milliLiter(s) Swish and Spit every 6 hours  calcium carbonate 1250 mG  + Vitamin D (OsCal 500 + D) 1 Tablet(s) Oral <User Schedule>  chlorhexidine 0.12% Liquid 15 milliLiter(s) Oral Mucosa every 12 hours  chlorhexidine 4% Liquid 1 Application(s) Topical daily  dextrose 5%. 1000 milliLiter(s) (100 mL/Hr) IV Continuous <Continuous>  dextrose 5%. 1000 milliLiter(s) (50 mL/Hr) IV Continuous <Continuous>  dextrose 50% Injectable 12.5 Gram(s) IV Push once  dextrose 50% Injectable 25 Gram(s) IV Push once  dextrose 50% Injectable 25 Gram(s) IV Push once  dextrose Oral Gel 15 Gram(s) Oral once  diclofenac sodium 1% Gel 2 Gram(s) Topical every 6 hours  escitalopram 10 milliGRAM(s) Oral <User Schedule>  fentaNYL   Patch  12 MICROgram(s)/Hr 1 Patch Transdermal every 72 hours  fentaNYL   Patch  25 MICROgram(s)/Hr 1 Patch Transdermal every 72 hours  ferrous    sulfate Liquid 300 milliGRAM(s) Oral daily  gabapentin Solution 250 milliGRAM(s) Oral <User Schedule>  glucagon  Injectable 1 milliGRAM(s) IntraMuscular once  hemorrhoidal Ointment 1 Application(s) Rectal at bedtime  influenza  Vaccine (HIGH DOSE) 0.5 milliLiter(s) IntraMuscular once  insulin glargine Injectable (LANTUS) 20 Unit(s) SubCutaneous at bedtime  insulin lispro (ADMELOG) corrective regimen sliding scale   SubCutaneous every 6 hours  insulin regular  human recombinant 14 Unit(s) SubCutaneous <User Schedule>  lactobacillus acidophilus 1 Tablet(s) Oral <User Schedule>  levothyroxine 125 MICROGram(s) Oral daily  lidocaine   4% Patch 1 Patch Transdermal every 24 hours  melatonin Liquid 12 milliGRAM(s) Oral <User Schedule>  multivitamin/minerals 1 Tablet(s) Oral daily  pantoprazole  Injectable 40 milliGRAM(s) IV Push every 12 hours  prednisoLONE acetate 1% Suspension 1 Drop(s) Both EYES every 6 hours  predniSONE   Tablet 5 milliGRAM(s) Oral <User Schedule>  QUEtiapine 12.5 milliGRAM(s) Oral <User Schedule>  simethicone 160 milliGRAM(s) Chew <User Schedule>  sodium chloride 1 Gram(s) Oral every 12 hours  sodium chloride 0.65% Nasal 1 Spray(s) Both Nostrils every 6 hours  witch hazel Pads 1 Application(s) Topical every 12 hours    MEDICATIONS  (PRN):  calcium carbonate    500 mG (Tums) Chewable 1 Tablet(s) Chew two times a day PRN Gas  diphenhydrAMINE Elixir 12.5 milliGRAM(s) Oral every 6 hours PRN Rash and/or Itching  oxyCODONE    IR 2.5 milliGRAM(s) Oral every 6 hours PRN Severe Pain (7 - 10)    NUTRITIONALLY PERTINENT LABS:  10-14 Na138 mmol/L Glu 204 mg/dL[H] K+ 4.6 mmol/L Cr  <0.30 mg/dL[L] BUN 10 mg/dL 10-14 Phos 3.5 mg/dL 10-09 Alb 2.5 g/dL[L]10-09 ALT 26 U/L AST 28 U/L Alkaline Phosphatase 84 U/L  10-14-24 @ 05:08 a1c 5.8    A1C with Estimated Average Glucose Result: 5.8 % (10-14-24 @ 05:08)  A1C with Estimated Average Glucose Result: 6.4 % (08-12-24 @ 07:32)    Finger Sticks:  POCT Blood Glucose.: 176 mg/dL (10-14 @ 14:39)  POCT Blood Glucose.: 188 mg/dL (10-14 @ 06:28)  POCT Blood Glucose.: 249 mg/dL (10-14 @ 01:59)  POCT Blood Glucose.: 327 mg/dL (10-13 @ 21:19)  POCT Blood Glucose.: 413 mg/dL (10-13 @ 17:18)    NUTRITIONALLY PERTINENT MEDICATIONS/LABS:  [x] Reviewed  [x] Relevant notes on medications/labs:  - prior hyponatremia; WNL as of today  - prior hypokalemia & hypophosphatemia 10/13, both WNL as of today  - prior hypomagnesemia last 10/12, WNL as of 10/13  - BG trend w/ erratic readings from mid 100s to >400s; lantus, humulin, admelog ordered, has been on steroid regimen w/ prednisone since 10/7 which can also influence blood glucose trend  - ordered for vitamin C, calcium + vitamin D, synthroid via J tube, multivitamin, sodium chloride tablets    EDEMA:  [x] Reviewed  [x] Relevant notes:  - currently w/ +1 generalized edema (same as initial assessment per chart, has had a 6.6kg weight loss in 5 days between weights)    GI/ I&O:  [x] Reviewed  [] Relevant notes:  [] Other:    SKIN:   [] No pressure injuries documented, per nursing flowsheet  [x] Pressure injury previously noted: 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 Diet:  - RD recommendation to be within range of estimated nutritional needs would be Logan Peptide 1.5 @ 55cc/hr x18 hours (990cc total volume) and maintaining current oral nutrition supplement orders (regimen would provide 1835kcal, 93g protein, 693cc free H2O daily which would meet 34kcal/kg, 1.7g/pro/kg based on BW 54.4kg)  - per family preference, continue current TF regimen of:  - TFs of Logan Peptide 1.5 @ 80cc/hr x18 hours (1440cc total volume) via J port  - combined regimen w/ supplementation provides 2528kcal, 127g protein, 1008cc free H2O daily (46kcal/kg, 2.3g/pro/kg based on BW 54.4kg)  - as medically feasible, maintain HOB >30 degrees to help minimize risk of aspiration  - fluid management per team  2. Supplements:  - as medically feasible, continue Prosource TF Free (ordered as No Carb Prosource in SCM) 1x/day, Banatrol 1x/day, Alfred 2x/day  3. Multivitamin/mineral supplementation:  - as medically feasible, continue multivitamin, vitamin C, calcium + vitamin D as ordered by team (patient's daughter requesting liquid forms if feasible, d/w PA)    [] Achieved - Continue current nutrition intervention(s)  [] Current medical condition precludes nutrition intervention at this time.    MONITORING AND EVALUATION:   RD remains available upon request and will follow up per protocol.    Jean Carlos Rushing, YOVANI, CDN - contact on TEAMS.

## 2024-10-14 NOTE — PROGRESS NOTE ADULT - PROBLEM SELECTOR PLAN 8
- Patient has known hx of prior G-Tube stoma leak   - S/p EGD for closure of Gastric Fistula (8/12) w/ Total of 3 Mantis clips and two 22 mm Microtech clips by GI Dr Rosales   - Old stoma peg site with mild clear drainage s/p repair of fistula  - As per daughter feeds and meds all administered via j-port and g-tube remains to continuously vented for chronic gaseous distention   - G-J Tube exchange today; Due to intermittent clogging and tube discoloration - Patient has known hx of prior G-Tube stoma leak   - S/p EGD for closure of Gastric Fistula (8/12) w/ Total of 3 Mantis clips and two 22 mm Microtech clips by GI Dr Rosales   - Old stoma peg site with mild clear drainage s/p repair of fistula  - As per daughter feeds and meds all administered via j-port and g-tube remains to continuously vented for chronic gaseous distention   - 10/14:  G-J exchanged by GI.  Feeds resumed.

## 2024-10-14 NOTE — PROVIDER CONTACT NOTE (OTHER) - ACTION/TREATMENT ORDERED:
will review pt chart, order tylenol and place additional orders as appropriate. repeat pt v/s and notify provider with results

## 2024-10-14 NOTE — PROGRESS NOTE ADULT - ASSESSMENT
73 yo F PMH T2DM on insulin, HTN, HLD, hypothyroidism, RA on Prednisone, Fibromyalgia, cerebral aneurysm s/p repair, chronic hypercapnic respiratory failure s/p trach (vent dependent) and chronic PEG 2022, recurrent C. diff infection (follows with Dr. 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  Pt also noted to have rectal bleeding this past week requiring transfusion 5 days ago in ED as per daughter. Daughter also reports brownish discharge from G-J tube. In ED, pt afebrile, fiO2 96-98% on 40% on ventilator.  Chest X-ray w/ opacification of right lung concerning for possible PNA.  Admitted to RCU for further management. Sputum cxs grew PSA; Treated with course of Cefepime. Patient with decreased H+H received 1 unit of PRBCS on 10/10.        10/14: Patient with stable H+H over the weekend. Patient currently NPO for EGD / G-J Exchange today. 75 yo F PMH T2DM on insulin, HTN, HLD, hypothyroidism, RA on Prednisone, Fibromyalgia, cerebral aneurysm s/p repair, chronic hypercapnic respiratory failure s/p trach (vent dependent) and chronic PEG 2022, recurrent C. diff infection (follows with Dr. 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  Pt also noted to have rectal bleeding this past week requiring transfusion 5 days ago in ED as per daughter. Daughter also reports brownish discharge from G-J tube. In ED, pt afebrile, fiO2 96-98% on 40% on ventilator.  Chest X-ray w/ opacification of right lung concerning for possible PNA.  Admitted to RCU for further management. Sputum cxs grew PSA; Treated with course of Cefepime. Patient with decreased H+H received 1 unit of PRBCS on 10/10.        10/14: Patient with stable H+H over the weekend. Received PEG-J exchange by GI via EGD.  Cleared for immediate use.  Feeds resumed, IVF discontinued.  Endocrine consulted for improved management of diabetes.

## 2024-10-14 NOTE — PROGRESS NOTE ADULT - PROBLEM SELECTOR PLAN 11
- Patients daughter and  provided with full medical update at bedside throughout the day - Dc planning when medically stable

## 2024-10-14 NOTE — PROGRESS NOTE ADULT - PROBLEM SELECTOR PROBLEM 5
Can you call patient and let her know that Dr. Brittany Martinez wants to see her again to go over MRI findings. And can you make sure she gets an appt with them? Also I'm going to refer her to a different pain management doctor- Dr. Leandra Garsia. Thanks.      Bill Stauffer Rheumatoid arthritis

## 2024-10-14 NOTE — PROGRESS NOTE ADULT - PROBLEM SELECTOR PLAN 1
- Patient with Chronic Trach at baseline   - Current Vent Settings: Volume AC :  RR: 18 PEEP: 5 FIO2: 40%  - CXR 10/7: Opacification of the right middle lobe may represent pneumonia versus atelectasis  - Received Rocephin and Azithro in the ED   - Sputum cx 10/8: PSA  - Completed course of Cefepime ended on 0/12  - ID continues to follow

## 2024-10-15 NOTE — DISCHARGE NOTE PROVIDER - NSDCMRMEDTOKEN_GEN_ALL_CORE_FT
acetaminophen 650 mg/25 mL oral liquid: 25 milliliter(s) by PEG tube every 6 hours  amLODIPine 10 mg oral tablet: 1 tab(s) by gastrostomy tube once a day  ascorbic acid 500 mg oral tablet: 1 tab(s) by gastrostomy tube once a day  Biotene Dry Mouth oral solution: 15 milliliter(s) orally 2 times a day  diclofenac 1% topical gel: Apply topically to affected area every 6 hours Apply to both knees  or hands for joint pain  diphenhydrAMINE 12.5 mg/5 mL oral liquid: 10 milliliter(s) by gastrostomy tube once a day as needed for Itching  Enbrel Prefilled Syringe 50 mg/mL subcutaneous solution: 50 milligram(s) subcutaneously once a week  escitalopram 10 mg oral tablet: 1 tab(s) by gastrostomy tube once a day MDD: 10mg  fentaNYL 25 mcg/hr transdermal film, extended release: 1 patch transdermally every 72 hours MDD: 1 patch per 72 hours  gabapentin 250 mg/5 mL oral solution: 2.5 milliliter(s) by PEG tube 2 times a day  HumaLOG KwikPen 100 units/mL injectable solution: 14 unit(s) subcutaneous 3 times a day  insulin lispro 100 units/mL injectable solution: injectable every 6 hours 2 Unit(s) if Glucose 151 - 200  4 Unit(s) if Glucose 201 - 250  6 Unit(s) if Glucose 251 - 300  8 Unit(s) if Glucose 301 - 350  10 Unit(s) if Glucose 351 - 400  12 Unit(s) if Glucose Greater Than 400  ipratropium-albuterol 0.5 mg-2.5 mg/3 mL inhalation solution: 3 milliliter(s) inhaled every 6 hours ICD 10 code  J68.3 dispense 30 day supply  lactobacillus acidophilus oral capsule: 2 cap(s) by PEG tube once a day  Lantus Solostar Pen 100 units/mL subcutaneous solution: 28 unit(s) subcutaneous once a day (at bedtime)  levothyroxine 125 mcg (0.125 mg) oral tablet: 1 tab(s) by gastrostomy tube once a day Give at 5AM, 1 hour before starting PEG feeds  lidocaine 4% topical film: Apply topically to affected area once a day 2 to shoulders and 2 to knees  melatonin 0.25 mg/mL oral liquid: 12 milliliter(s) by gastrostomy tube once a day (at bedtime)  Mucomyst-10 inhalation solution: 2 milliliter(s) by nebulizer 2 times a day  Multiple Vitamins with Minerals oral liquid: 15 milliliter(s) by gastrostomy tube once a day  pantoprazole 40 mg oral granule, delayed release: 1 each orally once a day  prednisoLONE acetate 1% ophthalmic suspension: 1 drop(s) to each affected eye 4 times a day apply to both eyes  predniSONE 5 mg/5 mL oral solution: 5 milliliter(s) by gastrostomy tube once a day  Seroquel 25 mg oral tablet: 0.5 tab(s) by gastrostomy tube once a day Given at 3PM while in hospital  sodium chloride 1000 mg oral tablet, soluble: 1 tab(s) by PEG tube once a day  sodium chloride nasal: 1 spray(s) in each nostril 4 times a day  Systane Complete Preservative Free ophthalmic solution: 1 drop(s) to each affected eye every 2 hours apply to both eyes  Tylenol 500 mg oral tablet: 1 tab(s) by jejunostomy tube every 6 hours

## 2024-10-15 NOTE — CHART NOTE - NSCHARTNOTEFT_GEN_A_CORE
Nutrition  Chart Note    Diet, NPO with Tube Feed:   Tube Feeding Modality: Jejunostomy  Taktio Peptide 1.5 ([id number])  Total Volume for 24 Hours (mL): 1440  Continuous  Until Goal Tube Feed Rate (mL per Hour): 80  Tube Feed Duration (in Hours): 18  Tube Feed Start Time: 20:00   Rate (mL per Hour): 70   Frequency: Every Hour  Free Water Flush Instructions:  20 cc q 1 hr to prevent j-tube from clogging  Alfred(7 Gm Arginine/7 Gm Glut/1.2 Gm HMB     Qty per Day:  2  No Carb Prosource (1pkg = 15gms Protein)     Qty per Day:  1  Banatrol TF     Qty per Day:  1/2 packet 2 x daily (10-12-24 @ 21:49) [Active]    Weights:   Daily Weight in k (10-09)    MEDICATIONS  (STANDING):  ascorbic acid  calcium carbonate 1250 mG  + Vitamin D (OsCal 500 + D)  dextrose 5%.  dextrose 5%.  dextrose 50% Injectable  dextrose 50% Injectable  glucagon  Injectable  insulin glargine Injectable (LANTUS)  insulin lispro (ADMELOG) corrective regimen sliding scale  insulin regular  human recombinant  levothyroxine  multivitamin/minerals/iron Oral Solution (CENTRUM)  pantoprazole  Injectable  predniSONE   Tablet  simethicone  sodium chloride    Pertinent Labs:   A1C with Estimated Average Glucose Result: 5.8 % (10-14-24 @ 05:08)  A1C with Estimated Average Glucose Result: 6.4 % (24 @ 07:32)    Finger Sticks:  POCT Blood Glucose.: 240 mg/dL (10-15 @ 06:27)  POCT Blood Glucose.: 353 mg/dL (10-14 @ 23:48)  POCT Blood Glucose.: 291 mg/dL (10-14 @ 18:03)  POCT Blood Glucose.: 176 mg/dL (10-14 @ 14:39)    Spoke with pt daughter Marysol Spann over phone ([phone]) to confirm pt home tube feeding regimen. Per daughter, pt receiving Taktio Peptide 1.5, 3 bottles/day. 80ml/hr x 12 hours. This is 960ml total volume which is roughly 3 bottles. Along with prosource TID, Alfred and banatrol. Pt was also receiving Kefir twice/day. Pt daughter working closely with outpatient endocrine.   Tube feeding and prosource providing 1748kcal (29kcal/kg) and 116g protein (~1.9g/kg).     Can continue home regimen in hospital per pt daughter preference.   RD remains available.     Rufina Morris, MS, RD, CDN / Teams

## 2024-10-15 NOTE — PROGRESS NOTE ADULT - SUBJECTIVE AND OBJECTIVE BOX
Patient is a 74y old  Female who presents with a chief complaint of Patient admitted with Hypoxemia and episode of Bright red blood per rectum (14 Oct 2024 08:29)    HPI:  75 yo F PMH T2DM on insulin, HTN, HLD, hypothyroidism, RA on Prednisone, Fibromyalgia, cerebral aneurysm s/p repair, chronic hypercapnic respiratory failure s/p trach (vent dependent) and chronic PEG 2022, recurrent C. diff infection (follows with Dr. Newman), persistent GJ tube leaks now s/p GC fistula repair 8/12 BIBEMS with daughter for respiratory distress, hypoxia to high 80s.  Pt also noted to have rectal bleeding this past week requiring transfusion 5 days ago in ED as per daughter.  In ED, pt afebrile, fiO2 96-98% on 40% on ventilator.  Chest Xray w/ opacification of right lung concerning for possible PNA.  Admitted to RCU for further management.      (07 Oct 2024 18:58)    Interval Events:    REVIEW OF SYSTEMS:  [ ] Positive  [ ] All other systems negative  [ ] Unable to assess ROS because ________    Vital Signs Last 24 Hrs  T(C): 36.4 (10-15-24 @ 06:30), Max: 38.5 (10-14-24 @ 21:25)  T(F): 97.5 (10-15-24 @ 06:30), Max: 101.3 (10-14-24 @ 21:25)  HR: 91 (10-15-24 @ 06:30) (80 - 120)  BP: 121/57 (10-15-24 @ 06:30) (103/54 - 163/64)  RR: 18 (10-15-24 @ 06:30) (17 - 21)  SpO2: 100% (10-15-24 @ 06:30) (99% - 100%)    PHYSICAL EXAM:  HEENT:   [ ]Tracheostomy:  [ ]Pupils equal  [ ]No oral lesions  [ ]Abnormal    SKIN  [ ] No Rash  [ ] Abnormal  [ ] pressure    CARDIAC  [ ]Regular  [ ]Abnormal    PULMONARY  [ ]Bilateral Clear Breath Sounds  [ ]Normal Excursion  [ ]Abnormal    GI  [ ]PEG      [ ] +BS		              [ ]Soft, nondistended, nontender	  [ ]Abnormal    MUSCULOSKELETAL                                   [ ]Bedbound                 [ ]Abnormal    [ ]Ambulatory/OOB to chair                           EXTREMITIES                                         [ ]Normal  [ ]Edema                           NEUROLOGIC  [ ] Normal, non focal  [ ] Focal findings:    PSYCHIATRIC  [ ]Alert and appropriate  [ ] Sedated	 [ ]Agitated    :  Mcbride: [ ] Yes, if yes: Date of Placement:                   [  ] No    LINES: Central Lines [ ] Yes, if yes: Date of Placement                                     [  ] No    HOSPITAL MEDICATIONS:  MEDICATIONS  (STANDING):  albuterol/ipratropium for Nebulization 3 milliLiter(s) Nebulizer every 6 hours  artificial  tears Solution 1 Drop(s) Both EYES every 6 hours  ascorbic acid 500 milliGRAM(s) Oral daily  Biotene Dry Mouth Oral Rinse 5 milliLiter(s) Swish and Spit every 6 hours  calcium carbonate 1250 mG  + Vitamin D (OsCal 500 + D) 1 Tablet(s) Oral <User Schedule>  chlorhexidine 0.12% Liquid 15 milliLiter(s) Oral Mucosa every 12 hours  chlorhexidine 4% Liquid 1 Application(s) Topical daily  dextrose 5%. 1000 milliLiter(s) (50 mL/Hr) IV Continuous <Continuous>  dextrose 5%. 1000 milliLiter(s) (100 mL/Hr) IV Continuous <Continuous>  dextrose 50% Injectable 25 Gram(s) IV Push once  dextrose 50% Injectable 25 Gram(s) IV Push once  dextrose 50% Injectable 12.5 Gram(s) IV Push once  dextrose Oral Gel 15 Gram(s) Oral once  diclofenac sodium 1% Gel 2 Gram(s) Topical every 6 hours  escitalopram 10 milliGRAM(s) Oral <User Schedule>  fentaNYL   Patch  12 MICROgram(s)/Hr 1 Patch Transdermal every 72 hours  gabapentin Solution 250 milliGRAM(s) Oral <User Schedule>  glucagon  Injectable 1 milliGRAM(s) IntraMuscular once  hemorrhoidal Ointment 1 Application(s) Rectal at bedtime  influenza  Vaccine (HIGH DOSE) 0.5 milliLiter(s) IntraMuscular once  insulin glargine Injectable (LANTUS) 20 Unit(s) SubCutaneous at bedtime  insulin lispro (ADMELOG) corrective regimen sliding scale   SubCutaneous every 6 hours  insulin regular  human recombinant 14 Unit(s) SubCutaneous <User Schedule>  lactobacillus acidophilus 1 Tablet(s) Oral <User Schedule>  levothyroxine 125 MICROGram(s) Oral daily  lidocaine   4% Patch 1 Patch Transdermal every 24 hours  melatonin Liquid 12 milliGRAM(s) Oral <User Schedule>  multivitamin/minerals/iron Oral Solution (CENTRUM) 15 milliLiter(s) Enteral Tube daily  pantoprazole  Injectable 40 milliGRAM(s) IV Push every 12 hours  prednisoLONE acetate 1% Suspension 1 Drop(s) Both EYES every 6 hours  predniSONE   Tablet 5 milliGRAM(s) Oral <User Schedule>  QUEtiapine 12.5 milliGRAM(s) Oral <User Schedule>  simethicone 160 milliGRAM(s) Chew <User Schedule>  sodium chloride 1 Gram(s) Oral every 12 hours  sodium chloride 0.65% Nasal 1 Spray(s) Both Nostrils every 6 hours  witch hazel Pads 1 Application(s) Topical every 12 hours    MEDICATIONS  (PRN):  calcium carbonate    500 mG (Tums) Chewable 1 Tablet(s) Chew two times a day PRN Gas  diphenhydrAMINE Elixir 12.5 milliGRAM(s) Oral every 6 hours PRN Rash and/or Itching  oxyCODONE    IR 2.5 milliGRAM(s) Oral every 6 hours PRN Severe Pain (7 - 10)      LABS:                        8.5    9.05  )-----------( 130      ( 14 Oct 2024 05:08 )             29.2     10-14    138  |  104  |  10  ----------------------------<  204[H]  4.6   |  25  |  <0.30[L]    Ca    7.7[L]      14 Oct 2024 05:08  Phos  3.5     10-14  Mg     2.2     10-14      Mode: AC/ CMV (Assist Control/ Continuous Mandatory Ventilation)  RR (machine): 18  TV (machine): 350  FiO2: 40  PEEP: 5  ITime: 1  MAP: 12  PIP: 31   Patient is a 74y old  Female who presents with a chief complaint of Patient admitted with Hypoxemia and episode of Bright red blood per rectum (14 Oct 2024 08:29)    HPI:  75 yo F PMH T2DM on insulin, HTN, HLD, hypothyroidism, RA on Prednisone, Fibromyalgia, cerebral aneurysm s/p repair, chronic hypercapnic respiratory failure s/p trach (vent dependent) and chronic PEG 2022, recurrent C. diff infection (follows with Dr. Newman), persistent GJ tube leaks now s/p GC fistula repair 8/12 BIBEMS with daughter for respiratory distress, hypoxia to high 80s.  Pt also noted to have rectal bleeding this past week requiring transfusion 5 days ago in ED as per daughter.  In ED, pt afebrile, fiO2 96-98% on 40% on ventilator.  Chest Xray w/ opacification of right lung concerning for possible PNA.  Admitted to RCU for further management.  (07 Oct 2024 18:58)    Interval Events: Fever overnight    REVIEW OF SYSTEMS:  [ ] Positive  [ ] All other systems negative  [x ] Unable to assess ROS because patient is NON-verbal at times    Vital Signs Last 24 Hrs  T(C): 36.4 (10-15-24 @ 06:30), Max: 38.5 (10-14-24 @ 21:25)  T(F): 97.5 (10-15-24 @ 06:30), Max: 101.3 (10-14-24 @ 21:25)  HR: 91 (10-15-24 @ 06:30) (80 - 120)  BP: 121/57 (10-15-24 @ 06:30) (103/54 - 163/64)  RR: 18 (10-15-24 @ 06:30) (17 - 21)  SpO2: 100% (10-15-24 @ 06:30) (99% - 100%)    PHYSICAL EXAM:  HEENT:   [x]Tracheostomy: #8 distal XLT Shiley   [x]Pupils equal  [x]No oral lesions  [ ]Abnormal    SKIN  [ ]No Rash  [x] Abnormal: B/L buttocks/sacral stage 1-2 pressure injury present on admission   [ ] pressure    CARDIAC  [x]Regular  [ ]Abnormal:    PULMONARY  [x]Bilateral Clear Breath Sounds  [ ]Normal Excursion  [ ]Abnormal    GI  [x]PEG J site c/d/I, new PEJ site [x] +BS		              [x]Soft, nondistended, no guarding or rebound tenderness   [ ]Abnormal    MUSCULOSKELETAL                                   [x]Bedbound                 [ ]Abnormal    [ ]Ambulatory/OOB to chair                           EXTREMITIES                                         [x]Normal  [ ]Edema                           NEUROLOGIC  [x] Normal, non focal  [ ] Focal findings:    PSYCHIATRIC  [x]Alert and appropriate  [ ] Sedated	 [ ]Agitated    :  Mcbride: [ ] Yes, if yes: Date of Placement:                   [x] No      HOSPITAL MEDICATIONS:  MEDICATIONS  (STANDING):  albuterol/ipratropium for Nebulization 3 milliLiter(s) Nebulizer every 6 hours  artificial  tears Solution 1 Drop(s) Both EYES every 6 hours  ascorbic acid 500 milliGRAM(s) Oral daily  Biotene Dry Mouth Oral Rinse 5 milliLiter(s) Swish and Spit every 6 hours  calcium carbonate 1250 mG  + Vitamin D (OsCal 500 + D) 1 Tablet(s) Oral <User Schedule>  chlorhexidine 0.12% Liquid 15 milliLiter(s) Oral Mucosa every 12 hours  chlorhexidine 4% Liquid 1 Application(s) Topical daily  dextrose 5%. 1000 milliLiter(s) (50 mL/Hr) IV Continuous <Continuous>  dextrose 5%. 1000 milliLiter(s) (100 mL/Hr) IV Continuous <Continuous>  dextrose 50% Injectable 25 Gram(s) IV Push once  dextrose 50% Injectable 25 Gram(s) IV Push once  dextrose 50% Injectable 12.5 Gram(s) IV Push once  dextrose Oral Gel 15 Gram(s) Oral once  diclofenac sodium 1% Gel 2 Gram(s) Topical every 6 hours  escitalopram 10 milliGRAM(s) Oral <User Schedule>  fentaNYL   Patch  12 MICROgram(s)/Hr 1 Patch Transdermal every 72 hours  gabapentin Solution 250 milliGRAM(s) Oral <User Schedule>  glucagon  Injectable 1 milliGRAM(s) IntraMuscular once  hemorrhoidal Ointment 1 Application(s) Rectal at bedtime  influenza  Vaccine (HIGH DOSE) 0.5 milliLiter(s) IntraMuscular once  insulin glargine Injectable (LANTUS) 20 Unit(s) SubCutaneous at bedtime  insulin lispro (ADMELOG) corrective regimen sliding scale   SubCutaneous every 6 hours  insulin regular  human recombinant 14 Unit(s) SubCutaneous <User Schedule>  lactobacillus acidophilus 1 Tablet(s) Oral <User Schedule>  levothyroxine 125 MICROGram(s) Oral daily  lidocaine   4% Patch 1 Patch Transdermal every 24 hours  melatonin Liquid 12 milliGRAM(s) Oral <User Schedule>  multivitamin/minerals/iron Oral Solution (CENTRUM) 15 milliLiter(s) Enteral Tube daily  pantoprazole  Injectable 40 milliGRAM(s) IV Push every 12 hours  prednisoLONE acetate 1% Suspension 1 Drop(s) Both EYES every 6 hours  predniSONE   Tablet 5 milliGRAM(s) Oral <User Schedule>  QUEtiapine 12.5 milliGRAM(s) Oral <User Schedule>  simethicone 160 milliGRAM(s) Chew <User Schedule>  sodium chloride 1 Gram(s) Oral every 12 hours  sodium chloride 0.65% Nasal 1 Spray(s) Both Nostrils every 6 hours  witch hazel Pads 1 Application(s) Topical every 12 hours    MEDICATIONS  (PRN):  calcium carbonate    500 mG (Tums) Chewable 1 Tablet(s) Chew two times a day PRN Gas  diphenhydrAMINE Elixir 12.5 milliGRAM(s) Oral every 6 hours PRN Rash and/or Itching  oxyCODONE    IR 2.5 milliGRAM(s) Oral every 6 hours PRN Severe Pain (7 - 10)      LABS:                        8.5    9.05  )-----------( 130      ( 14 Oct 2024 05:08 )             29.2     10-14    138  |  104  |  10  ----------------------------<  204[H]  4.6   |  25  |  <0.30[L]    Ca    7.7[L]      14 Oct 2024 05:08  Phos  3.5     10-14  Mg     2.2     10-14      Mode: AC/ CMV (Assist Control/ Continuous Mandatory Ventilation)  RR (machine): 18  TV (machine): 350  FiO2: 40  PEEP: 5  ITime: 1  MAP: 12  PIP: 31

## 2024-10-15 NOTE — PROGRESS NOTE ADULT - PROBLEM SELECTOR PLAN 1
- Patient with Chronic Trach at baseline   - Current Vent Settings: Volume AC :  RR: 18 PEEP: 5 FIO2: 40%  - CXR 10/7: Opacification of the right middle lobe may represent pneumonia versus atelectasis  - Received Rocephin and Azithro in the ED   - Sputum cx 10/8: PSA  - Completed course of Cefepime ended on 0/12  - ID continues to follow - Patient with Chronic Trach at baseline   - Current Vent Settings: Volume AC :  RR: 18 PEEP: 5 FIO2: 40%  - CXR 10/7: Opacification of the right middle lobe may represent pneumonia versus atelectasis  - Received Rocephin and Azithro in the ED   - Sputum cx 10/8: PSA  - Completed course of Cefepime ended on 0/12  - ID continues to follow  - Fever post PEJ change, ^procalcitonin, leukocytosis  - Placed on Meropenem 10/15

## 2024-10-15 NOTE — CONSULT NOTE ADULT - ASSESSMENT
73 yo F PMH T2DM on insulin, HTN, HLD, hypothyroidism, RA on Prednisone, Fibromyalgia, cerebral aneurysm s/p repair, chronic hypercapnic respiratory failure s/p trach (vent dependent) and chronic PEG 2022, recurrent C. diff infection (follows with Dr. 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 s/p anabiotics and s/p GJ tube exchanges on 10/14 Endocrine consulted for Type 2 DM management while on tube feeding. BG Goal 100-180mg/dl   Last 24 hour BGs 200s-300s, noted  received less Lantus dose on 10/13 while holding tube feeding for GJ tube exchange, and team increased Regular insulin to 18 units. Currently receiving Regular insulin q 6 hours at 6 am , 12noon and 6pm while on continuous tube feeding 8pm to 2 pm. Advised team to change tube feeding time to 6 am - MN instead of 8pm-2pm, accordingly with BG check and regular insulin injection time. Team requested  inpatient insulin regimen rec that she will be discharged on. Advised to change tube feeding for discharge, so insulin regimen and doses can be adjusted accordingly.       # T2DM with hyperglycemia   - Most recent Hemoglobin A1C 5.8 %  - Current FS ranges from 200-300  - Current diet: 18 hour continuous tube feeding ( [employer] Peptide 1.5 at 80 ml/hr for 18 hours)   - Please monitor blood glucose valuesq 6 hours while on tube feeding or NPO  - Blood glucose goals pre-meal less than 140 mg/dL and random blood glucose less than 180 mg/dL  - Recommendations:  - Continue Lantus 20 units at HS  - Team increased to Regular insulin 18 units q 6 hours at 6am, 12 noon and 6pm while on continuous tube feeding ( Hold if holding tube feeding )   - Continue Regular insulin Moderate scale q 6 hours while on tube feeding   - Please keep hypoglycemia protocol   - Please change the tube feeding hours to 6 am - MN accordingly with BG checking hours         DISCHARGE PLANNING:  - TBD depending on insulin requirement and discharge tube feeding regimen ( Bolus vs continuous tube feeding )   - If bolus tube feeding > Lantus plus Humalog   - If continuous tube feeding > Lantus plus Regular insulin    - Continue to check blood glucose 4 x daily  - Please make sure patient has all needed supplies: glucometer, test strips, lancets, alcohol swabs, pen needles.   - Followup with Dr Ruth: [facility]: [address] [place] [address]. [address]. [place] [address]. Tel: [phone]    #Secondary adrenal insufficiency  - 2/2 long term use of prednisone  - c/w prednisone 5mg daily    #Hypothyroidism  - home regimen LT4 125 mcg daily  Currently on home dose  LT4 125 mcg PO daily. Please administer in the AM on an empty stomach, 1 hour before food or other medications, and 4 hours before any PPI, calcium, or iron supplements. Can give at 5AM if starting tube feeds start at 6AM.    Last TFTs 4/29  Free Thyroxine, Serum in AM (04.29.24 @ 11:01)    Free Thyroxine, Serum: 0.9 ng/dL    Thyroid Stimulating Hormone, Serum (04.29.24 @ 11:01)    Thyroid Stimulating Hormone, Serum: 0.84 uIU/mL    - Please check TSH and free T4    #Hypertension  - Goal BP <130/80  - on amlodipine  - Management as per primary team  - check urine microalbumin level as outpatient    #Hyperlipidemia  - LDL goal <70  - Last LDL: 63 mg/dL (04-04-24)  - consider mod dose statin if no contraindication  - check lipid panel as outpatient on a yearly basis    Contact via Microsoft Teams during business hours  To reach covering provider access AMION via sunrise tools  For Urgent matters/after-hours/weekends/holidays please page endocrine fellow on call   For nonurgent matters please email [email or web address]    Please note that this patient may be followed by different provider tomorrow.  Notify endocrine 24 hours prior to discharge for final recommendations.       75 yo F PMH T2DM on insulin, HTN, HLD, hypothyroidism, RA on Prednisone, Fibromyalgia, cerebral aneurysm s/p repair, chronic hypercapnic respiratory failure s/p trach (vent dependent) and chronic PEG 2022, recurrent C. diff infection (follows with Dr. 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 s/p anabiotics and s/p GJ tube exchanges on 10/14 Endocrine consulted for Type 2 DM management while on tube feeding. BG Goal 100-180mg/dl   Last 24 hour BGs 200s-300s, noted  received less Lantus dose on 10/13 while holding tube feeding for GJ tube exchange, and team increased Regular insulin to 18 units. Currently receiving Regular insulin q 6 hours at 6 am , 12noon and 6pm while on continuous tube feeding 8pm to 2 pm. Advised team to change tube feeding time to 6 am - MN instead of 8pm-2pm, accordingly with BG check and regular insulin injection time. Team requested  inpatient insulin regimen rec that she will be discharged on. Advised to change tube feeding for discharge, so insulin regimen and doses can be adjusted accordingly.       # T2DM with hyperglycemia   - Most recent Hemoglobin A1C 5.8 %  - Current FS ranges from 200-300  - Current diet: 18 hour continuous tube feeding ( [employer] Peptide 1.5 at 80 ml/hr for 18 hours)   - Please monitor blood glucose valuesq 6 hours while on tube feeding or NPO  - Blood glucose goals pre-meal less than 140 mg/dL and random blood glucose less than 180 mg/dL  - Recommendations:  - Continue Lantus 20 units at HS  - Team increased to Regular insulin 18 units q 6 hours at 6am, 12 noon and 6pm while on continuous tube feeding ( Hold if holding tube feeding )   - Continue Regular insulin Moderate scale q 6 hours while on tube feeding   - Please keep hypoglycemia protocol   - Please change the tube feeding hours to 6 am - MN accordingly with BG checking hours     Addendum at 14: 14  Contacted by team, reported that TFs will be changed to[employer] Peptide 1.5 (KFPEPT1.5RTH) 80 ml /hr for 12 hour starts at 8am  for total volume 960 ml for 24 hours    - Am  while running tube feeding, Continue Lantus 20 units at HS as total tube feeding amt decreased. should be able to evaluate fasting BG better if pt is off tube feeding at night. Will adjust Lantus dose tomorrow   - Will change standing insulin to Admleg 20 units q 4 hours at 8 am, 12 noon and 4pm from Regular insulin q 6 hours at 6am, 12noon, and 6pm  - Will change correction scale to Moderate Admelog correction scale q 4 hours at 8am, 12 noon, 4 pm and 8 pm   - Start Low admelog correction scale at 2 am to monitor hypoglycemia and hyperglycemia             DISCHARGE PLANNING:  - TBD depending on insulin requirement and discharge tube feeding regimen ( Bolus vs continuous tube feeding )   - If bolus tube feeding > Lantus plus Humalog   - If continuous tube feeding > Lantus plus Regular insulin    - Continue to check blood glucose 4 x daily  - Please make sure patient has all needed supplies: glucometer, test strips, lancets, alcohol swabs, pen needles.   - Followup with Dr Ruth: [facility]: [address] [place] [address]. [address]. [place] [address]. Tel: [phone]    #Secondary adrenal insufficiency  - 2/2 long term use of prednisone  - c/w prednisone 5mg daily    #Hypothyroidism  - home regimen LT4 125 mcg daily  Currently on home dose  LT4 125 mcg PO daily. Please administer in the AM on an empty stomach, 1 hour before food or other medications, and 4 hours before any PPI, calcium, or iron supplements. Can give at 5AM if starting tube feeds start at 6AM.    Last TFTs 4/29  Free Thyroxine, Serum in AM (04.29.24 @ 11:01)    Free Thyroxine, Serum: 0.9 ng/dL    Thyroid Stimulating Hormone, Serum (04.29.24 @ 11:01)    Thyroid Stimulating Hormone, Serum: 0.84 uIU/mL    - Please check TSH and free T4    #Hypertension  - Goal BP <130/80  - on amlodipine  - Management as per primary team  - check urine microalbumin level as outpatient    #Hyperlipidemia  - LDL goal <70  - Last LDL: 63 mg/dL (04-04-24)  - consider mod dose statin if no contraindication  - check lipid panel as outpatient on a yearly basis    Contact via Microsoft Teams during business hours  To reach covering provider access AMION via [employer]  For Urgent matters/after-hours/weekends/holidays please page endocrine fellow on call   For nonurgent matters please email [email or web address]    Please note that this patient may be followed by different provider tomorrow.  Notify endocrine 24 hours prior to discharge for final recommendations.       73 yo F PMH T2DM on insulin, HTN, HLD, hypothyroidism, RA on Prednisone, Fibromyalgia, cerebral aneurysm s/p repair, chronic hypercapnic respiratory failure s/p trach (vent dependent) and chronic PEG 2022, recurrent C. diff infection (follows with Dr. 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 s/p anabiotics and s/p GJ tube exchanges on 10/14 Endocrine consulted for Type 2 DM management while on tube feeding. BG Goal 100-180mg/dl   Patient is high risk with high level decision making due to hyperglycemia  BGs 200s-300s which places patient at high risk for cardiovascular and cerebrovascular events. Patient with lability of glucose requiring close monitoring and insulin adjustments.  Last 24 hour BGs 200s-300s, noted  received less Lantus dose on 10/13 while holding tube feeding for GJ tube exchange, and team increased Regular insulin to 18 units. Currently receiving Regular insulin q 6 hours at 6 am , 12noon and 6pm while on continuous tube feeding 8pm to 2 pm. Advised team to change tube feeding time to 6 am - MN instead of 8pm-2pm, accordingly with BG check and regular insulin injection time. Team requested  inpatient insulin regimen rec that she will be discharged on. Advised to change tube feeding for discharge, so insulin regimen and doses can be adjusted accordingly.       # T2DM with hyperglycemia   - Most recent Hemoglobin A1C 5.8 %  - Current FS ranges from 200-300  - Current diet: 18 hour continuous tube feeding ( [employer] Peptide 1.5 at 80 ml/hr for 18 hours)   - Please monitor blood glucose valuesq 6 hours while on tube feeding or NPO  - Blood glucose goals pre-meal less than 140 mg/dL and random blood glucose less than 180 mg/dL  - Recommendations:  - Continue Lantus 20 units at HS  - Team increased to Regular insulin 18 units q 6 hours at 6am, 12 noon and 6pm while on continuous tube feeding ( Hold if holding tube feeding )   - Continue Regular insulin Moderate scale q 6 hours while on tube feeding   - Please keep hypoglycemia protocol   - Please change the tube feeding hours to 6 am - MN accordingly with BG checking hours     Addendum at 14: 14  Contacted by team, reported that TFs will be changed [employer] Peptide 1.5 (KFPEPT1.5RTH) 80 ml /hr for 12 hour starts at 8am  for total volume 960 ml for 24 hours    - Am  while running tube feeding, Continue Lantus 20 units at HS as total tube feeding amt decreased. should be able to evaluate fasting BG better if pt is off tube feeding at night. Will adjust Lantus dose tomorrow   - Will change standing insulin to Admleg 20 units q 4 hours at 8 am, 12 noon and 4pm from Regular insulin q 6 hours at 6am, 12noon, and 6pm  - Will change correction scale to Moderate Admelog correction scale q 4 hours at 8am, 12 noon, 4 pm and 8 pm   - Start Low admelog correction scale at 2 am to monitor hypoglycemia and hyperglycemia             DISCHARGE PLANNING:  - TBD depending on insulin requirement and discharge tube feeding regimen ( Bolus vs continuous tube feeding )   - If bolus tube feeding > Lantus plus Humalog   - If continuous tube feeding > Lantus plus Regular insulin    - Continue to check blood glucose 4 x daily  - Please make sure patient has all needed supplies: glucometer, test strips, lancets, alcohol swabs, pen needles.   - Followup with Dr Ruth: [facility]: [address] [place] [address]. [address]. [place] [address]. Tel: [phone]    #Secondary adrenal insufficiency  - 2/2 long term use of prednisone  - c/w prednisone 5mg daily    #Hypothyroidism  - home regimen LT4 125 mcg daily  Currently on home dose  LT4 125 mcg PO daily. Please administer in the AM on an empty stomach, 1 hour before food or other medications, and 4 hours before any PPI, calcium, or iron supplements. Can give at 5AM if starting tube feeds start at 6AM.    Last TFTs 4/29  Free Thyroxine, Serum in AM (04.29.24 @ 11:01)    Free Thyroxine, Serum: 0.9 ng/dL    Thyroid Stimulating Hormone, Serum (04.29.24 @ 11:01)    Thyroid Stimulating Hormone, Serum: 0.84 uIU/mL    - Please check TSH and free T4    #Hypertension  - Goal BP <130/80  - on amlodipine  - Management as per primary team  - check urine microalbumin level as outpatient    #Hyperlipidemia  - LDL goal <70  - Last LDL: 63 mg/dL (04-04-24)  - consider mod dose statin if no contraindication  - check lipid panel as outpatient on a yearly basis    Contact via Microsoft Teams during business hours  To reach covering provider access AMION via sunrise tools  For Urgent matters/after-hours/weekends/holidays please page endocrine fellow on call   For nonurgent matters please email [email or web address]    Please note that this patient may be followed by different provider tomorrow.  Notify endocrine 24 hours prior to discharge for final recommendations.

## 2024-10-15 NOTE — PROGRESS NOTE ADULT - ASSESSMENT
73 yo F PMH T2DM on insulin, HTN, HLD, hypothyroidism, RA on Prednisone, Fibromyalgia, cerebral aneurysm s/p repair, chronic hypercapnic respiratory failure s/p trach (vent dependent) and chronic PEG 2022, recurrent C. diff infection , persistent GJ tube leaks now s/p GC fistula repair 8/12  admitted 10/7/24 with resp distress and found to have RML opacity     Antibiotics  Ceftriaxone 10/7  Azithro 10/7  Cefepime 10/7--> 10/12      10/10 melena, anemia, blood tx  10/14 EGD for GJ exchange and piror PEG site closure  One benign-appearing, intrinsic moderate stenosis was found in the upper third of the        esophagus. The stenosis was traversed.       The cardia and gastric fundus were normal.       A gastric tube with tip in the jejunum was found in the gastric body. The PEG required        removal because it was leaking. The J tube extension (12F) was removed first. An externally        removable 24 Fr EndoVive Safety gastrostomy tube was lubricated. The guide wire was passed        through the existing G-tube port and snared endoscopically. The endoscope and snare were then        removed, pulling the wire out through the mouth. The g-tube was tied to the guidewire, pulled        through the mouth into the stomach and then pulled out from the stomach through the skin. The        bumper was attached to the gastrostomy tube. The feeding tube was then cut to an appropriate        length. The final position of the gastrostomy tube was confirmed by relook endoscopy, and        skin marking noted to be 3 cm at the external bumper. The final tension and compression of        the abdominal wall by the PEG tube and external bumper were checked and revealed that the        bumper was moderately tight and mildly deforming the skin. The J tube extension (12 F        EndoVive Jejunal feeding tube) was advanced into the stomach. The tip of the J tube had a        loop thread that was captured with a Resolution clip. The thread and the J tube extension        were advanced to the proximal jejunum, and the endoclip along with the tip of the J tube was        attached to the wall securely. The J tube extension was capped, and the tube site was cleaned        and dressed.       A small fistula was found on the anterior wall of the gastric body related to prior G tube        site. Coagulation of tissue near the fistula using argon plasma at 1.2 liters/minute and 35        peraza was successful. To closure the gastrocutaneous fistula, four hemostatic clips were        successfully placed (MR conditional, two 16 mm DuraClip and 2 Mantis clips.       The examined duodenum was normal.    Fever, transient hypoxia post procedure    Suggest  check CXR or POCUS of chest  ProCalcitonin  Consider Meropenem for recurrent fever/toxicity    discussed with ACP

## 2024-10-15 NOTE — PROGRESS NOTE ADULT - NS ATTEND AMEND GEN_ALL_CORE FT
Pt is a 74F with MHx IDDM2, HTN, HLD, hypothyroidism, RA on Prednisone, fibromyalgia, cerebral aneurysm s/p repair, chronic hypoxemia and hypercapnic respiratory failure, tracheostomy and vent dependent, recurrent C. diff infection, chronic PEG 2022 with persistent GJ tube leaks now s/p GC fistula repair 8/12, recent presentation 5 days PTA for rectal bleeding requiring transfusion in the ED who now presents with acute hypoxemic respiratory distress likely 2/2 RML PNA admitted to the RCU for further management.       #Neuro - awake, alert, and interactive by mouthing words  - Was evaluated for hearing loss but unable to cooperate with audiogram - follow-up as outpatient  - History of anxiety and tardive which per daughter was treated and improved with Seroquel and Lexapro but now family noting some of these symptoms (clicking noises and unusual movements again). We discussed medication titration but this may also impact respiratory status  - Continue pain medications  #Cardiovascular - hemodynamically stable though noted to be more tachycardic this AM - possibly in setting of new/recurrent infection  #Pulmonary - chronic hypoxemic and hypercapnic respiratory failure on mechanical ventilation  - Has been vent dependent but daughter note that in March 2023 she was able to come off the vent for some time at [facility]. We discussed that this may not be possible but we will continue PSV trials as able and if SAFE. She is presently post anesthesia and breathing with the vent.  - Has recently had some blood tinged secretions likely due to deep suctioning - will monitor output and minimize suctioning  - Sputum cultures with Pseudomonas in past - will repeat sputum culture given fevers and increased coarse BS on R  - Continue bronchodilators and airway clearance. Continue trach care. 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 Monitor for drainage  - Restarted feeds   - Patient noted to have anemia of unclear etiology and did receive PRBC during this admission with appropriate response. Has had prior melena which daughter felt was due to hemorrhoids. Follow-up GI regarding EGD findings.  - Continue PPI  - Daughter has noted bloating and the G tube has been vented at times. Patient would benefit from having a venting system at home for G-tube as she does require G-tube venting to prevent abdominal distension  #Nephro - normal renal function  #Infectious Disease - completed course of antibiotics for bacterial pneumonia (RML) previously  - Patient now with fevers - follow-up blood cultures, check UA, urine culture, sputum culture, procalcitonin  - Given history will likely start Meropenem if lab abnormalities or persistent fevers - with understanding there is a risk given her history of c. diff  - ID evaluation noted  - Sacral wound > 2 years. Wound care follow-up  #Hem/Onc - monitor counts and transfuse as needed  - DVT proph with SCDs given recent bleeding and PRBC transfusion  #Endo - DM2 with hyperglycemia - continue insulin and adjust as needed for goal FS < 200  #Rheum - RA with chronic pain - continue Prednisone and pain management.  - Would hold off Enbrel in setting of fevers      Long term prognosis guarded. Discussed with patient's daughter, Marysol, over the phone again today and all questions answered.

## 2024-10-15 NOTE — PROGRESS NOTE ADULT - ASSESSMENT
75 yo F PMH T2DM on insulin, HTN, HLD, hypothyroidism, RA on Prednisone, Fibromyalgia, cerebral aneurysm s/p repair, chronic hypercapnic respiratory failure s/p trach (vent dependent) and chronic PEG 2022, recurrent C. diff infection (follows with Dr. 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  Pt also noted to have rectal bleeding this past week requiring transfusion 5 days ago in ED as per daughter. Daughter also reports brownish discharge from G-J tube. In ED, pt afebrile, fiO2 96-98% on 40% on ventilator.  Chest X-ray w/ opacification of right lung concerning for possible PNA.  Admitted to RCU for further management. Sputum cxs grew PSA; Treated with course of Cefepime. Patient with decreased H+H received 1 unit of PRBCS on 10/10.        10/14: Patient with stable H+H over the weekend. Received PEG-J exchange by GI via EGD.  Cleared for immediate use.  Feeds resumed, IVF discontinued.  Endocrine consulted for improved management of diabetes.  73 yo F PMH T2DM on insulin, HTN, HLD, hypothyroidism, RA on Prednisone, Fibromyalgia, cerebral aneurysm s/p repair, chronic hypercapnic respiratory failure s/p trach (vent dependent) and chronic PEG 2022, recurrent C. diff infection (follows with Dr. 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  Pt also noted to have rectal bleeding this past week requiring transfusion 5 days ago in ED as per daughter. Daughter also reports brownish discharge from G-J tube. In ED, pt afebrile, fiO2 96-98% on 40% on ventilator.  Chest X-ray w/ opacification of right lung concerning for possible PNA.  Admitted to RCU for further management. Sputum cxs grew PSA; Treated with course of Cefepime. Patient with decreased H+H received 1 unit of PRBCS on 10/10.        10/15: Received PEG-J exchange by GI via EGD 10/14.  Cleared for immediate use. Fever of 101.8, PAN cultured, ^ procalcitonin, questionable PNA or translocation during PEJ change. Placed on Meropenem.

## 2024-10-15 NOTE — DISCHARGE NOTE PROVIDER - CARE PROVIDER_API CALL
Anil Rosales  Gastroenterology  [address], [address]  [place] [address]  Phone: [phone]  Fax: [phone]  Follow Up Time:

## 2024-10-15 NOTE — CHART NOTE - NSCHARTNOTEFT_GEN_A_CORE
Patient and family known to me from previous admission.   Spoke with Marysol by phone today offering to establish a family meeting to once again discuss options for compassionate extubation and a symptom directed approach .  She advises that this would not be a good time as her mother has spiked a fever and the doctors are trying to evaluate a source in order to treat the presumed infection appropriately.  Additionally, Marysol shared she needed to complete her tax returns and was unable to stay on the phone.   At her request , we reviewed medication management and I instructed her to reach out to primary team for any questions or concerns regarding patients regimen.   Palliative care will sign off for now,  I shared our contact information with Marysol, she will reach out to us when she is ready. Patient and family known to me from previous admission.   Spoke with Marysol by phone today offering to establish a family meeting to once again discuss options for compassionate extubation and a symptom directed approach .  She advises that this would not be a good time as her mother has spiked a fever and the doctors are trying to evaluate a source in order to treat the presumed infection appropriately.  Additionally, Marysol shared she needed to complete her tax returns and was unable to stay on the phone.   At her request , we reviewed medication management and I instructed her to reach out to primary team for any questions or concerns regarding patients regimen.   Palliative care will sign off for now,  I shared our contact information with Marysol, she will reach out to us when she is ready.    PRIOR MOLST :  noted in patient window:   DNR,  + intubate

## 2024-10-15 NOTE — PROGRESS NOTE ADULT - PROBLEM SELECTOR PLAN 4
- Patient on Lantus and Humalog at home    - Continue Lantus /ISS / Humulin r   - Monitor BGMS - Patient on Lantus and Humalog at home    - Continue Lantus /ISS / Humulin r   - Endocrine consult appreciated

## 2024-10-15 NOTE — PROGRESS NOTE ADULT - PROBLEM SELECTOR PLAN 8
- Patient has known hx of prior G-Tube stoma leak   - S/p EGD for closure of Gastric Fistula (8/12) w/ Total of 3 Mantis clips and two 22 mm Microtech clips by GI Dr Rosales   - Old stoma peg site with mild clear drainage s/p repair of fistula  - As per daughter feeds and meds all administered via j-port and g-tube remains to continuously vented for chronic gaseous distention   - 10/14:  G-J exchanged by GI.  Feeds resumed.

## 2024-10-15 NOTE — DISCHARGE NOTE PROVIDER - HOSPITAL COURSE
75 yo F PMH T2DM on insulin, HTN, HLD, hypothyroidism, RA on Prednisone, Fibromyalgia, cerebral aneurysm s/p repair, chronic hypercapnic respiratory failure s/p trach (vent dependent) and chronic PEG 2022, recurrent C. diff infection (follows with Dr. 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  Pt also noted to have rectal bleeding this past week requiring transfusion 5 days ago in ED as per daughter. Daughter also reports brownish discharge from G-J tube. In ED, pt afebrile, fiO2 96-98% on 40% on ventilator.  Chest X-ray w/ opacification of right lung concerning for possible PNA.  Admitted to [facility] for further management. Sputum cxs grew PSA; Treated with course of Cefepime. Patient with decreased H+H received 1 unit of PRBCS on 10/10. S/P PEG-J exchange by GI via EGD.  Cleared for immediate use.

## 2024-10-15 NOTE — BH CONSULTATION LIAISON ASSESSMENT NOTE - NSBHMSEBEHAV_PSY_A_CORE
Georgi Pruitt, [facility] Onc Nurse Msg Pool  Xeloda - Pending provider's response    There is no patient assistance program for Xeloda. Best bet would be for patient to get the medication at a reduced price through [facility].    That way patient could receive medication for under $100 for a 30 day supply    Please advise if this is the route you would like to go    Thank you,  Georgi Pruitt  10/15/24   [employer]

## 2024-10-15 NOTE — CONSULT NOTE ADULT - SUBJECTIVE AND OBJECTIVE BOX
73 yo F PMH T2DM on insulin, HTN, HLD, hypothyroidism, RA on Prednisone, Fibromyalgia, cerebral aneurysm s/p repair, chronic hypercapnic respiratory failure s/p trach (vent dependent) and chronic PEG 2022, recurrent C. diff infection (follows with Dr. Newman), persistent GJ tube leaks now s/p GC fistula repair 8/12 BIBEMS with daughter for respiratory distress, hypoxia to high 80s.  Pt also noted to have rectal bleeding this past week requiring transfusion 5 days ago in ED as per daughter.  In ED, pt afebrile, fiO2 96-98% on 40% on ventilator.  Chest Xray w/ opacification of right lung concerning for possible PNA.  Admitted to [facility]CU for further management.  s/p anabiotics and s/p GJ tube exchanges on 10/14 Endocrine consulted for Type 2 DM management while on tube feeding.   Has tracheostomy and on ventilator. Interview was done with daughter on the phone       Dabetes history:  Diagnosed with T2 DM more than 20 years ago, thought to be related to chronic steroid use   Hx of hypoglycemia:  Hx of DKA/HHS?  Complications: No neuropathy, retinopathy or neuropathy.   Outpatient Endo: Dr. Rupali Ruth   Most recent A1C: 5.8 ( 10/14/24)      Home DM medications: Lantus 35 units at HS, Humalog 26 units with # 1 feeding, 22 units with #2 tube feeding, 20 units with #3 tube feeding and  Humalog correction scale (  2 units for -112, 4 units for -250, 6 units for -300, 8units for -350, 10 units for -400, 12 units for -450)   while on [employer] formula 3 cans/day, slow bolus( run  at 80 ml/hr total volume 960 ml/day> 1st can around 6:30-8:30, 2nd can around 3 pm, 3rd can around 8-9 pm) plus Banatrol 1/2 packet BID, was increasing to 1 packet BID, plus Kefir 10 oz/day ( divided in multiple times throughout the day), Insulin dose recently changed on 10/5 by PRITI Atwood on 10/2  On chronic prednisone 5 mg daily.    Current inpatient DM Meds:   On Lantus 20 units at HS, Regular insulin 18units at 6 am, 12 noon, 6pm while on 18 hour continuous tube feeding 8pm -2 pm     FH:  DM: T2 DM over 20 years   Thyroid: Hxof hypothyroidism, currently on 125 mcg daily   Autoimmune: Hx of rheumatoid arthritis, on chronic prednisone 5 mg daily   Other:    SH:  Smoking: denies  Etoh: denies  Recreational Drugs: denies    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      H/O tracheostomy      PEG (percutaneous endoscopic gastrostomy) status              Current Meds:  albuterol/ipratropium for Nebulization 3 milliLiter(s) Nebulizer every 6 hours  artificial  tears Solution 1 Drop(s) Both EYES every 6 hours  ascorbic acid 500 milliGRAM(s) Oral daily  Biotene Dry Mouth Oral Rinse 5 milliLiter(s) Swish and Spit every 6 hours  calcium carbonate    500 mG (Tums) Chewable 1 Tablet(s) Chew two times a day PRN  calcium carbonate 1250 mG  + Vitamin D (OsCal 500 + D) 1 Tablet(s) Oral <User Schedule>  chlorhexidine 0.12% Liquid 15 milliLiter(s) Oral Mucosa every 12 hours  chlorhexidine 4% Liquid 1 Application(s) Topical daily  dextrose 5%. 1000 milliLiter(s) IV Continuous <Continuous>  dextrose 5%. 1000 milliLiter(s) IV Continuous <Continuous>  dextrose 50% Injectable 25 Gram(s) IV Push once  dextrose 50% Injectable 12.5 Gram(s) IV Push once  diclofenac sodium 1% Gel 2 Gram(s) Topical every 6 hours  diphenhydrAMINE Elixir 12.5 milliGRAM(s) Oral every 6 hours PRN  escitalopram 10 milliGRAM(s) Oral <User Schedule>  fentaNYL   Patch  12 MICROgram(s)/Hr 1 Patch Transdermal every 72 hours  gabapentin Solution 250 milliGRAM(s) Oral <User Schedule>  glucagon  Injectable 1 milliGRAM(s) IntraMuscular once  hemorrhoidal Ointment 1 Application(s) Rectal at bedtime  influenza  Vaccine (HIGH DOSE) 0.5 milliLiter(s) IntraMuscular once  insulin glargine Injectable (LANTUS) 20 Unit(s) SubCutaneous at bedtime  insulin lispro (ADMELOG) corrective regimen sliding scale   SubCutaneous every 6 hours  insulin regular  human recombinant 18 Unit(s) SubCutaneous <User Schedule>  lactobacillus acidophilus 1 Tablet(s) Oral <User Schedule>  levothyroxine 125 MICROGram(s) Oral daily  lidocaine   4% Patch 1 Patch Transdermal every 24 hours  melatonin Liquid 12 milliGRAM(s) Oral <User Schedule>  multivitamin/minerals/iron Oral Solution (CENTRUM) 15 milliLiter(s) Enteral Tube daily  oxyCODONE    IR 2.5 milliGRAM(s) Oral every 6 hours PRN  pantoprazole  Injectable 40 milliGRAM(s) IV Push every 12 hours  prednisoLONE acetate 1% Suspension 1 Drop(s) Both EYES every 6 hours  predniSONE   Tablet 5 milliGRAM(s) Oral <User Schedule>  QUEtiapine 12.5 milliGRAM(s) Oral <User Schedule>  simethicone 160 milliGRAM(s) Chew <User Schedule>  sodium chloride 1 Gram(s) Oral every 12 hours  sodium chloride 0.65% Nasal 1 Spray(s) Both Nostrils every 6 hours  witch hazel Pads 1 Application(s) Topical every 12 hours      Allergies:  walnut (Unknown)  metronidazole (Rash)  Lyrica (Unknown)  penicillin (Unknown)  Pineapple (Unknown)  Tagamet (Unknown)  heparin (Unknown)  Pecans (Unknown)  Hazelnut (Unknown)  meropenem (Rash)      ROS:     ROS limited due to pt with tracheostomy, nods yes or no     Vital Signs Last 24 Hrs  T(C): 36.4 (15 Oct 2024 06:30), Max: 38.5 (14 Oct 2024 21:25)  T(F): 97.5 (15 Oct 2024 06:30), Max: 101.3 (14 Oct 2024 21:25)  HR: 118 (15 Oct 2024 09:23) (80 - 120)  BP: 121/57 (15 Oct 2024 06:30) (103/54 - 163/64)  BP(mean): --  RR: 18 (15 Oct 2024 06:30) (17 - 21)  SpO2: 98% (15 Oct 2024 09:23) (98% - 100%)    Parameters below as of 15 Oct 2024 06:30  Patient On (Oxygen Delivery Method): ventilator      Height (cm): 149.9 (10-14 @ 10:15)  Weight (kg): 54.4 (10-14 @ 10:15)  BMI (kg/m2): 24.2 (10-14 @ 10:15)    VITALS: T(C): 36.4 (10-15-24 @ 06:30)  T(F): 97.5 (10-15-24 @ 06:30), Max: 101.3 (10-14-24 @ 21:25)  HR: 118 (10-15-24 @ 09:23) (80 - 120)  BP: 121/57 (10-15-24 @ 06:30) (103/54 - 163/64)  RR:  (17 - 21)  SpO2:  (98% - 100%)  Wt(kg): --  GENERAL: NAD, on ventilator   EYES: No proptosis, no lid lag, anicteric, extraocular movements intact  HEENT:  Atraumatic, Normocephalic, moist mucous membranes, + trach   THYROID: Normal size, no palpable nodules, no thyromegaly  RESPIRATORY: + trach and on ventilator   CARDIOVASCULAR: non tachycardic, no peripheral edema  GI: Soft, nontender, non distended, +PEG  SKIN: Dry, intact, No rashes or lesions  NEURO: sensation intact, no tremor  EXTREMITIES: no foot ulcers and bilateral distal pedal pulses intact  PSYCH: reactive affect, euthymic mood      LABS:                        8.5    9.05  )-----------( 130      ( 14 Oct 2024 05:08 )             29.2     10-14    138  |  104  |  10  ----------------------------<  204[H]  4.6   |  25  |  <0.30[L]    Ca    7.7[L]      14 Oct 2024 05:08  Phos  3.5     10-14  Mg     2.2     10-14        Urinalysis Basic - ( 14 Oct 2024 05:08 )    Color: x / Appearance: x / SG: x / pH: x  Gluc: 204 mg/dL / Ketone: x  / Bili: x / Urobili: x   Blood: x / Protein: x / Nitrite: x   Leuk Esterase: x / RBC: x / WBC x   Sq Epi: x / Non Sq Epi: x / Bacteria: x        Thyroid Stimulating Hormone, Serum: 0.84 (04-29 @ 11:01)  Thyroid Stimulating Hormone, Serum: 0.13 (04-23 @ 04:35)  Thyroid Stimulating Hormone, Serum: 1.66 (01-09 @ 06:46)  Thyroid Stimulating Hormone, Serum: 1.57 (12-04 @ 07:01)  Thyroid Stimulating Hormone, Serum: 2.80 (11-28 @ 06:37)  Thyroid Stimulating Hormone, Serum: 7.90 (11-10 @ 06:26)  Thyroid Stimulating Hormone, Serum: 8.90 (11-09 @ 06:58)  Thyroid Stimulating Hormone, Serum: 9.03 (10-28 @ 07:18)      RADIOLOGY & ADDITIONAL STUDIES:  CAPILLARY BLOOD GLUCOSE      POCT Blood Glucose.: 240 mg/dL (15 Oct 2024 06:27)  POCT Blood Glucose.: 353 mg/dL (14 Oct 2024 23:48)  POCT Blood Glucose.: 291 mg/dL (14 Oct 2024 18:03)  POCT Blood Glucose.: 176 mg/dL (14 Oct 2024 14:39)

## 2024-10-15 NOTE — PROGRESS NOTE ADULT - TIME BILLING
- preparing to see the patient (eg, review of tests, documents, and imaging)  - performing a medically appropriate evaluation and examination  - speaking with patient and/or family as appropriate  - referring and communicating with other health care professionals  - documenting clinical information in the electronic or other health record  - care coordination.    time spent does not include teaching services or ventilator management.    Patient continues to require mechanical ventilation during hospitalization

## 2024-10-15 NOTE — PROGRESS NOTE ADULT - SUBJECTIVE AND OBJECTIVE BOX
Follow Up:  s/p PEJ replacement 10/14    Interval History/ROS:  lethargic  - had fever hypoxia over night  -    Allergies  walnut (Unknown)  metronidazole (Rash)  Lyrica (Unknown)  penicillin (Unknown)  Pineapple (Unknown)  Tagamet (Unknown)  heparin (Unknown)  Pecans (Unknown)  Hazelnut (Unknown)  meropenem (Rash)        ANTIMICROBIALS:      OTHER MEDS:  MEDICATIONS  (STANDING):  albuterol/ipratropium for Nebulization 3 every 6 hours  calcium carbonate    500 mG (Tums) Chewable 1 two times a day PRN  dextrose 50% Injectable 25 once  dextrose 50% Injectable 12.5 once  diphenhydrAMINE Elixir 12.5 every 6 hours PRN  escitalopram 10 <User Schedule>  fentaNYL   Patch  12 MICROgram(s)/Hr 1 every 72 hours  gabapentin Solution 250 <User Schedule>  glucagon  Injectable 1 once  influenza  Vaccine (HIGH DOSE) 0.5 once  insulin glargine Injectable (LANTUS) 20 at bedtime  insulin lispro (ADMELOG) corrective regimen sliding scale  every 6 hours  insulin regular  human recombinant 18 <User Schedule>  levothyroxine 125 daily  melatonin Liquid 12 <User Schedule>  oxyCODONE    IR 2.5 every 6 hours PRN  pantoprazole  Injectable 40 every 12 hours  predniSONE   Tablet 5 <User Schedule>  QUEtiapine 12.5 <User Schedule>  simethicone 160 <User Schedule>      Vital Signs Last 24 Hrs  T(C): 37 (15 Oct 2024 12:05), Max: 38.5 (14 Oct 2024 21:25)  T(F): 98.6 (15 Oct 2024 12:05), Max: 101.3 (14 Oct 2024 21:25)  HR: 121 (15 Oct 2024 12:05) (80 - 121)  BP: 163/60 (15 Oct 2024 12:05) (103/54 - 163/64)  BP(mean): --  RR: 18 (15 Oct 2024 12:05) (17 - 21)  SpO2: 100% (15 Oct 2024 12:05) (98% - 100%)    Parameters below as of 15 Oct 2024 12:05  Patient On (Oxygen Delivery Method): ventilator        PHYSICAL EXAM:  General: lethargic  - slighlty warm to touch  Neurology: eye closed  Respiratory: bilateral fine rhonchi  CV: RRR, S1S2,  rapid heart rate  Abdominal: , Non-tender, distended, moderatly tight   = new pEG/J in place  Extremities: No edema,  Line Sites: Clear  Skin: No rash                          8.5    9.05  )-----------( 130      ( 14 Oct 2024 05:08 )             29.2   WBC Count: 9.05 (10-14 @ 05:08)  WBC Count: 8.57 (10-13 @ 07:06)  WBC Count: 7.27 (10-12 @ 06:46)  WBC Count: 8.63 (10-11 @ 11:17)  WBC Count: 7.21 (10-10 @ 16:38)    10-14    138  |  104  |  10  ----------------------------<  204[H]  4.6   |  25  |  <0.30[L]    Ca    7.7[L]      14 Oct 2024 05:08  Phos  3.5     10-14  Mg     2.2     10-14    Urine Microscopic-Add On (NC) (10.07.24 @ 16:28)   Epithelial Cells: 0 /HPF  Review: Reviewed  Cast: 0 /LPF  Red Blood Cell - Urine: 0 /HPF  White Blood Cell - Urine: 1 /HPF  Bacteria: Negative /HPF  10.07.24 @ 12:37)  Procalcitonin: 0.32:   MICROBIOLOGY:  Trach Asp Tracheal Aspirate  10-08-24   Few Pseudomonas aeruginosa  Commensal vinay consistent with body site   (10.08.24 @ 08:18)   Moderate polymorphonuclear leukocytes per low power field. Rare Squamous epithelial cells per low power field. Rare Gram Variable Rods per oil power field  - Aztreonam: S <=4  - Cefepime: S 4  - Ceftazidime: S <=1  - Ciprofloxacin: R >2  - Imipenem: S 2  - Levofloxacin: R >4  - Meropenem: S <=1  - Piperacillin/Tazobactam: S <=8  Specimen Source: Trach Asp Tracheal Aspirate  Culture Results: Few Pseudomonas aeruginosa       Clean Catch Clean Catch (Midstream)  10-07-24   10,000 - 49,000 CFU/mL Escherichia coli ESBL       .Blood BLOOD  10-07-24   No growth at 5 days  --  --      .Blood BLOOD  10-07-24   No growth at 5 days  --  --      Clean Catch Clean Catch (Midstream)  10-03-24   Culture grew 3 or more types of organisms which indicate  collection contamination; consider recollection only if clinically  indicated.  --  --      RADIOLOGY:  < from: Xray Chest 1 View- PORTABLE-Routine (Xray Chest 1 View- PORTABLE-Routine in AM.) (10.11.24 @ 09:26) >  IMPRESSION:    Increasing opacities of both lung bases right greater than left which may   represent pleural effusions with atelectasis or pneumonia.    < end of copied text >      Zion Newman MD; Division of Infectious Disease; Pager: [phone]; nights and weekends: [phone]

## 2024-10-16 NOTE — PROGRESS NOTE ADULT - ASSESSMENT
73 yo F PMH T2DM on insulin, HTN, HLD, hypothyroidism, RA on Prednisone, Fibromyalgia, cerebral aneurysm s/p repair, chronic hypercapnic respiratory failure s/p trach (vent dependent) and chronic PEG 2022, recurrent C. diff infection (follows with Dr. 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 s/p anabiotics and s/p GJ tube exchanges on 10/14 Endocrine consulted for Type 2 DM management while on tube feeding. BG Goal 100-180mg/dl   Last 24 hour BGs 159-327. BGs mostly above goal while on tube feeding except Fasting .   Currently on 12 hours continuous tube feeding 8am- 8 pm. As per team, plan to continue current tube feeding while inpatient.         # T2DM with hyperglycemia   - Most recent Hemoglobin A1C 5.8 %  - Home DM medications: Lantus 35 units at HS, Humalog 26 units with # 1 feeding, 22 units with #2 tube feeding, 20 units with #3 tube feeding and  Humalog correction scale (  2 units for -968, 4 units for -250, 6 units for -300, 8units for -350, 10 units for -400, 12 units for -450)   while on [employer] formula 3 cans/day, slow bolus( run  at 80 ml/hr total volume 960 ml/day> 1st can around 6:30-8:30, 2nd can around 3 pm, 3rd can around 8-9 pm) plus Banatrol 1/2 packet BID, was increasing to 1 packet BID, plus Kefir 10 oz/day ( divided in multiple times throughout the day), Insulin dose recently changed on 10/5 by PRITI Atwood on 10/2  On chronic prednisone 5 mg daily.  - Endocrinologist : Dr. Faraz Zapien  - Current FS ranges from 200-300  - Current diet: 12 hour continuous tube feeding( [employer] Peptide 1.5 at 80 ml/hr for 12 hours)   - Please monitor blood glucose valuesq 6 hours while on tube feeding or NPO  - Blood glucose goals pre-meal less than 140 mg/dL and random blood glucose less than 180 mg/dL  - Recommendations:  - Continue Lantus 20 units at HS  - Increase Admelog insulin 30 units q  4 hours at 8 am,  12 noon and 4pm while on continuous tube feeding ( Hold if holding tube feeding )   - Continue Admelog Moderate scale q 4 hours while on tube feeding and low admelog scale at 2 am   - Please keep hypoglycemia protocol       DISCHARGE PLANNING:  - TBD depending on insulin requirement and discharge tube feeding regimen ( Bolus vs continuous tube feeding )   - If bolus tube feeding > Lantus plus Humalog   - If continuous tube feeding > Lantus plus Regular insulin    - Continue to check blood glucose 4 x daily  - Please make sure patient has all needed supplies: glucometer, test strips, lancets, alcohol swabs, pen needles.   - Followup with Dr Ruth: [facility]: [address] [place] [address]. [address]. [place] [address]. Tel: [phone]    #Secondary adrenal insufficiency  - 2/2 long term use of prednisone  - c/w prednisone 5mg daily    #Hypothyroidism  - home regimen LT4 125 mcg daily  Continue home dose  LT4 125 mcg PO daily. Please administer in the AM on an empty stomach, 1 hour before food or other medications, and 4 hours before any PPI, calcium, or iron supplements. Can give at 5AM if starting tube feeds start at 6AM.      TSH and free T4 results WNL    Thyroid Stimulating Hormone, Serum in AM (10.16.24 @ 06:50)    Thyroid Stimulating Hormone, Serum: 1.74 uIU/mL    Free Thyroxine, Serum in AM (10.16.24 @ 06:50)    Free Thyroxine, Serum: 1.2 ng/dL        #Hypertension  - Goal BP <130/80  - on amlodipine  - Management as per primary team  - check urine microalbumin level as outpatient    #Hyperlipidemia  - LDL goal <70  - Last LDL: 63 mg/dL (04-04-24)  - consider mod dose statin if no contraindication  - check lipid panel as outpatient on a yearly basis    Contact via [employer] Teams during business hours  To reach covering provider access AMION via sunrise tools  For Urgent matters/after-hours/weekends/holidays please page endocrine fellow on call   For nonurgent matters please email [email or web address]    Please note that this patient may be followed by different provider tomorrow.  Notify endocrine 24 hours prior to discharge for final recommendations.

## 2024-10-16 NOTE — PROGRESS NOTE ADULT - SUBJECTIVE AND OBJECTIVE BOX
Follow Up:  fever    Interval History/ROS:  lethargic    Allergies  walnut (Unknown)  metronidazole (Rash)  Lyrica (Unknown)  penicillin (Unknown)  Pineapple (Unknown)  Tagamet (Unknown)  heparin (Unknown)  Pecans (Unknown)  Hazelnut (Unknown)  meropenem (Rash)        ANTIMICROBIALS:  meropenem  IVPB 1000 every 8 hours      OTHER MEDS:  MEDICATIONS  (STANDING):  albuterol/ipratropium for Nebulization 3 every 6 hours  calcium carbonate    500 mG (Tums) Chewable 1 two times a day PRN  dextrose 50% Injectable 25 once  dextrose 50% Injectable 12.5 once  diphenhydrAMINE Elixir 12.5 every 6 hours PRN  diphenhydrAMINE Injectable 12.5 <User Schedule>  escitalopram 10 <User Schedule>  fentaNYL   Patch  12 MICROgram(s)/Hr 1 every 72 hours  fentaNYL   Patch  25 MICROgram(s)/Hr 1 every 72 hours  gabapentin Solution 250 <User Schedule>  glucagon  Injectable 1 once  influenza  Vaccine (HIGH DOSE) 0.5 once  insulin glargine Injectable (LANTUS) 20 at bedtime  insulin lispro (ADMELOG) corrective regimen sliding scale  <User Schedule>  insulin lispro (ADMELOG) corrective regimen sliding scale  <User Schedule>  insulin lispro Injectable (ADMELOG) 30 <User Schedule>  levothyroxine 125 daily  melatonin Liquid 12 <User Schedule>  oxyCODONE    IR 2.5 every 6 hours PRN  pantoprazole  Injectable 40 every 12 hours  predniSONE   Tablet 5 <User Schedule>  QUEtiapine 12.5 <User Schedule>  simethicone 160 <User Schedule>      Vital Signs Last 24 Hrs  T(C): 37.2 (16 Oct 2024 16:04), Max: 37.8 (16 Oct 2024 11:48)  T(F): 99 (16 Oct 2024 16:04), Max: 100.1 (16 Oct 2024 11:48)  HR: 106 (16 Oct 2024 17:26) (86 - 108)  BP: 107/54 (16 Oct 2024 11:48) (107/54 - 144/66)  BP(mean): --  RR: 18 (16 Oct 2024 11:48) (18 - 18)  SpO2: 100% (16 Oct 2024 17:26) (99% - 100%)    Parameters below as of 16 Oct 2024 17:26  Patient On (Oxygen Delivery Method): ventilator        PHYSICAL EXAM:  General: ill appearing  Neurology: lethargic  Respiratory: on vent - trach in place, scattered crackles  CV: RRR, S1S2, no murmurs, rubs or gallops  Abdominal: Soft, Non-tender,distended, decreased bowel sounds  Extremities: No edema,   Line Sites: Clear  Skin: No rash                          8.4    14.26 )-----------( 111      ( 16 Oct 2024 06:50 )             30.2       10-16    139  |  109[H]  |  17  ----------------------------<  205[H]  5.0   |  22  |  <0.30[L]    Ca    8.0[L]      16 Oct 2024 06:51  Phos  2.9     10-16  Mg     2.1     10-16    Urinalysis + Microscopic Examination (10.15.24 @ 15:09)   pH Urine: 5.0  Urine Appearance: Clear  Color: Dark Yellow  Specific Gravity: 1.024  Protein, Urine: Negative mg/dL  Glucose Qualitative, Urine: Negative mg/dL  Ketone - Urine: Negative mg/dL  Blood, Urine: Negative  Bilirubin: Negative  Urobilinogen: 1.0 mg/dL  Leukocyte Esterase Concentration: Negative  Nitrite: Negative  Review: Reviewed  White Blood Cell - Urine: 2 /HPF  Red Blood Cell - Urine: 1 /HPF  Bacteria: Negative /HPF  Cast: 15 /LPF        MICROBIOLOGY:  Trach Asp Tracheal Aspirate  10-15-24 --  --    No polymorphonuclear leukocytes per low power field  No Squamous epithelial cells per low power field  Numerous Gram Negative Rods per oil power field  Few Gram Positive Rods per oil power field      .Blood BLOOD  10-15-24   No growth at 24 hours  --  --      Trach Asp Tracheal Aspirate  10-08-24   Few Pseudomonas aeruginosa  Commensal vinay consistent with body site       Clean Catch Clean Catch (Midstream)  10-07-24   10,000 - 49,000 CFU/mL Escherichia coli ESBL  --  Escherichia coli ESBL      .Blood BLOOD  10-07-24   No growth at 5 days  --  --      .Blood BLOOD  10-07-24   No growth at 5 days  --  --      Clean Catch Clean Catch (Midstream)  10-03-24   Culture grew 3 or more types of organisms which indicate  collection contamination; consider recollection only if clinically  indicated.  --  --      RADIOLOGY:  < from: Xray Chest 1 View- PORTABLE-Urgent (Xray Chest 1 View- PORTABLE-Urgent .) (10.15.24 @ 15:47) >  IMPRESSION:    Interval decrease in bilateral patchy opacities with improved aeration of   the bilateral lung fields. Trace bilateral pleural effusions and/or   bibasilar atelectasis. Low lung volumes.    < end of copied text >      Zion Newman MD; Division of Infectious Disease; Pager: [phone]; nights and weekends: [phone]

## 2024-10-16 NOTE — PROGRESS NOTE ADULT - SUBJECTIVE AND OBJECTIVE BOX
Patient is a 74y old  Female who presents with a chief complaint of Patient admitted with Hypoxemia and episode of Bright red blood per rectum (15 Oct 2024 12:31)    HPI:  75 yo F PMH T2DM on insulin, HTN, HLD, hypothyroidism, RA on Prednisone, Fibromyalgia, cerebral aneurysm s/p repair, chronic hypercapnic respiratory failure s/p trach (vent dependent) and chronic PEG 2022, recurrent C. diff infection (follows with Dr. Newman), persistent GJ tube leaks now s/p GC fistula repair 8/12 BIBEMS with daughter for respiratory distress, hypoxia to high 80s.  Pt also noted to have rectal bleeding this past week requiring transfusion 5 days ago in ED as per daughter.  In ED, pt afebrile, fiO2 96-98% on 40% on ventilator.  Chest Xray w/ opacification of right lung concerning for possible PNA.  Admitted to RCU for further management.      (07 Oct 2024 18:58)    Interval Events:    REVIEW OF SYSTEMS:  [ ] Positive  [ ] All other systems negative  [ ] Unable to assess ROS because ________    Vital Signs Last 24 Hrs  T(C): 37.4 (10-16-24 @ 06:00), Max: 38.4 (10-15-24 @ 13:30)  T(F): 99.4 (10-16-24 @ 06:00), Max: 101.2 (10-15-24 @ 13:30)  HR: 96 (10-16-24 @ 11:15) (86 - 121)  BP: 144/66 (10-16-24 @ 06:00) (109/55 - 163/60)  RR: 18 (10-16-24 @ 06:00) (18 - 18)  SpO2: 100% (10-16-24 @ 11:15) (99% - 100%)    PHYSICAL EXAM:  HEENT:   [ ]Tracheostomy:  [ ]Pupils equal  [ ]No oral lesions  [ ]Abnormal    SKIN  [ ] No Rash  [ ] Abnormal  [ ] pressure    CARDIAC  [ ]Regular  [ ]Abnormal    PULMONARY  [ ]Bilateral Clear Breath Sounds  [ ]Normal Excursion  [ ]Abnormal    GI  [ ]PEG      [ ] +BS		              [ ]Soft, nondistended, nontender	  [ ]Abnormal    MUSCULOSKELETAL                                   [ ]Bedbound                 [ ]Abnormal    [ ]Ambulatory/OOB to chair                           EXTREMITIES                                         [ ]Normal  [ ]Edema                           NEUROLOGIC  [ ] Normal, non focal  [ ] Focal findings:    PSYCHIATRIC  [ ]Alert and appropriate  [ ] Sedated	 [ ]Agitated    :  Mcbride: [ ] Yes, if yes: Date of Placement:                   [  ] No    LINES: Central Lines [ ] Yes, if yes: Date of Placement                                     [  ] No    HOSPITAL MEDICATIONS:  MEDICATIONS  (STANDING):  albuterol/ipratropium for Nebulization 3 milliLiter(s) Nebulizer every 6 hours  artificial  tears Solution 1 Drop(s) Both EYES every 6 hours  ascorbic acid 500 milliGRAM(s) Oral daily  Biotene Dry Mouth Oral Rinse 5 milliLiter(s) Swish and Spit every 6 hours  calcium carbonate 1250 mG  + Vitamin D (OsCal 500 + D) 1 Tablet(s) Oral <User Schedule>  chlorhexidine 0.12% Liquid 15 milliLiter(s) Oral Mucosa every 12 hours  chlorhexidine 4% Liquid 1 Application(s) Topical daily  dextrose 5%. 1000 milliLiter(s) (100 mL/Hr) IV Continuous <Continuous>  dextrose 5%. 1000 milliLiter(s) (50 mL/Hr) IV Continuous <Continuous>  dextrose 50% Injectable 12.5 Gram(s) IV Push once  dextrose 50% Injectable 25 Gram(s) IV Push once  diclofenac sodium 1% Gel 2 Gram(s) Topical every 6 hours  diphenhydrAMINE Injectable 12.5 milliGRAM(s) IV Push <User Schedule>  escitalopram 10 milliGRAM(s) Oral <User Schedule>  fentaNYL   Patch  12 MICROgram(s)/Hr 1 Patch Transdermal every 72 hours  fentaNYL   Patch  25 MICROgram(s)/Hr 1 Patch Transdermal every 72 hours  gabapentin Solution 250 milliGRAM(s) Oral <User Schedule>  glucagon  Injectable 1 milliGRAM(s) IntraMuscular once  hemorrhoidal Ointment 1 Application(s) Rectal at bedtime  influenza  Vaccine (HIGH DOSE) 0.5 milliLiter(s) IntraMuscular once  insulin glargine Injectable (LANTUS) 20 Unit(s) SubCutaneous at bedtime  insulin lispro (ADMELOG) corrective regimen sliding scale   SubCutaneous <User Schedule>  insulin lispro (ADMELOG) corrective regimen sliding scale   SubCutaneous <User Schedule>  insulin lispro Injectable (ADMELOG) 26 Unit(s) SubCutaneous <User Schedule>  lactobacillus acidophilus 1 Tablet(s) Oral <User Schedule>  levothyroxine 125 MICROGram(s) Oral daily  lidocaine   4% Patch 1 Patch Transdermal every 24 hours  melatonin Liquid 12 milliGRAM(s) Oral <User Schedule>  meropenem  IVPB 1000 milliGRAM(s) IV Intermittent every 8 hours  multivitamin/minerals/iron Oral Solution (CENTRUM) 15 milliLiter(s) Enteral Tube daily  pantoprazole  Injectable 40 milliGRAM(s) IV Push every 12 hours  prednisoLONE acetate 1% Suspension 1 Drop(s) Both EYES every 6 hours  predniSONE   Tablet 5 milliGRAM(s) Oral <User Schedule>  QUEtiapine 12.5 milliGRAM(s) Oral <User Schedule>  simethicone 160 milliGRAM(s) Chew <User Schedule>  sodium chloride 1 Gram(s) Oral every 12 hours  sodium chloride 0.65% Nasal 1 Spray(s) Both Nostrils every 6 hours  witch hazel Pads 1 Application(s) Topical every 12 hours    MEDICATIONS  (PRN):  calcium carbonate    500 mG (Tums) Chewable 1 Tablet(s) Chew two times a day PRN Gas  diphenhydrAMINE Elixir 12.5 milliGRAM(s) Oral every 6 hours PRN Rash and/or Itching  oxyCODONE    IR 2.5 milliGRAM(s) Oral every 6 hours PRN Severe Pain (7 - 10)      LABS:                        8.4    14.26 )-----------( 111      ( 16 Oct 2024 06:50 )             30.2     10-16    139  |  109[H]  |  17  ----------------------------<  205[H]  5.0   |  22  |  <0.30[L]    Ca    8.0[L]      16 Oct 2024 06:51  Phos  2.9     10-16  Mg     2.1     10-16        Urinalysis Basic - ( 16 Oct 2024 06:51 )    Color: x / Appearance: x / SG: x / pH: x  Gluc: 205 mg/dL / Ketone: x  / Bili: x / Urobili: x   Blood: x / Protein: x / Nitrite: x   Leuk Esterase: x / RBC: x / WBC x   Sq Epi: x / Non Sq Epi: x / Bacteria: x          CAPILLARY BLOOD GLUCOSE    MICROBIOLOGY:     RADIOLOGY:  [ ] Reviewed and interpreted by me      Mode: AC/ CMV (Assist Control/ Continuous Mandatory Ventilation)  RR (machine): 18  TV (machine): 350  FiO2: 40  PEEP: 5  ITime: 1  MAP: 11  PIP: 37            Karl Lawson, [facility]  [id number] Patient is a 74y old  Female who presents with a chief complaint of Patient admitted with Hypoxemia and episode of Bright red blood per rectum (15 Oct 2024 12:31)    HPI:  75 yo F PMH T2DM on insulin, HTN, HLD, hypothyroidism, RA on Prednisone, Fibromyalgia, cerebral aneurysm s/p repair, chronic hypercapnic respiratory failure s/p trach (vent dependent) and chronic PEG 2022, recurrent C. diff infection (follows with Dr. Newman), persistent GJ tube leaks now s/p GC fistula repair 8/12 BIBEMS with daughter for respiratory distress, hypoxia to high 80s.  Pt also noted to have rectal bleeding this past week requiring transfusion 5 days ago in ED as per daughter.  In ED, pt afebrile, fiO2 96-98% on 40% on ventilator.  Chest Xray w/ opacification of right lung concerning for possible PNA.  Admitted to RCU for further management.  (07 Oct 2024 18:58)    Interval Events: No issues overnight    REVIEW OF SYSTEMS:  [ ] Positive  [x ] All other systems negative  [ ] Unable to assess ROS because ________    Vital Signs Last 24 Hrs  T(C): 37.4 (10-16-24 @ 06:00), Max: 38.4 (10-15-24 @ 13:30)  T(F): 99.4 (10-16-24 @ 06:00), Max: 101.2 (10-15-24 @ 13:30)  HR: 96 (10-16-24 @ 11:15) (86 - 121)  BP: 144/66 (10-16-24 @ 06:00) (109/55 - 163/60)  RR: 18 (10-16-24 @ 06:00) (18 - 18)  SpO2: 100% (10-16-24 @ 11:15) (99% - 100%)    PHYSICAL EXAM:  HEENT:   [x]Tracheostomy: #8 distal XLT [place]iley   [x]Pupils equal  [x]No oral lesions  [ ]Abnormal    SKIN  [ ]No Rash  [x] Abnormal: B/L buttocks/sacral stage 1-2 pressure injury present on admission   [ ] pressure    CARDIAC  [x]Regular  [ ]Abnormal:    PULMONARY  [x]Bilateral Clear Breath Sounds  [ ]Normal Excursion  [ ]Abnormal    GI  [x]PEG J site c/d/I, new PEJ site [x] +BS G portion to venting              [x]Soft, nondistended, no guarding or rebound tenderness   [ ]Abnormal    MUSCULOSKELETAL                                   [x]Bedbound                 [ ]Abnormal    [ ]Ambulatory/OOB to chair                           EXTREMITIES                                         [x]Normal  [ ]Edema                           NEUROLOGIC  [x] Normal, non focal  [ ] Focal findings:    PSYCHIATRIC  [x]Alert and appropriate  [ ] Sedated	 [ ]Agitated    :  Mcbride: [ ] Yes, if yes: Date of Placement:                   [x] No    HOSPITAL MEDICATIONS:  MEDICATIONS  (STANDING):  albuterol/ipratropium for Nebulization 3 milliLiter(s) Nebulizer every 6 hours  artificial  tears Solution 1 Drop(s) Both EYES every 6 hours  ascorbic acid 500 milliGRAM(s) Oral daily  Biotene Dry Mouth Oral Rinse 5 milliLiter(s) Swish and Spit every 6 hours  calcium carbonate 1250 mG  + Vitamin D (OsCal 500 + D) 1 Tablet(s) Oral <User Schedule>  chlorhexidine 0.12% Liquid 15 milliLiter(s) Oral Mucosa every 12 hours  chlorhexidine 4% Liquid 1 Application(s) Topical daily  dextrose 5%. 1000 milliLiter(s) (100 mL/Hr) IV Continuous <Continuous>  dextrose 5%. 1000 milliLiter(s) (50 mL/Hr) IV Continuous <Continuous>  dextrose 50% Injectable 12.5 Gram(s) IV Push once  dextrose 50% Injectable 25 Gram(s) IV Push once  diclofenac sodium 1% Gel 2 Gram(s) Topical every 6 hours  diphenhydrAMINE Injectable 12.5 milliGRAM(s) IV Push <User Schedule>  escitalopram 10 milliGRAM(s) Oral <User Schedule>  fentaNYL   Patch  12 MICROgram(s)/Hr 1 Patch Transdermal every 72 hours  fentaNYL   Patch  25 MICROgram(s)/Hr 1 Patch Transdermal every 72 hours  gabapentin Solution 250 milliGRAM(s) Oral <User Schedule>  glucagon  Injectable 1 milliGRAM(s) IntraMuscular once  hemorrhoidal Ointment 1 Application(s) Rectal at bedtime  influenza  Vaccine (HIGH DOSE) 0.5 milliLiter(s) IntraMuscular once  insulin glargine Injectable (LANTUS) 20 Unit(s) SubCutaneous at bedtime  insulin lispro (ADMELOG) corrective regimen sliding scale   SubCutaneous <User Schedule>  insulin lispro (ADMELOG) corrective regimen sliding scale   SubCutaneous <User Schedule>  insulin lispro Injectable (ADMELOG) 26 Unit(s) SubCutaneous <User Schedule>  lactobacillus acidophilus 1 Tablet(s) Oral <User Schedule>  levothyroxine 125 MICROGram(s) Oral daily  lidocaine   4% Patch 1 Patch Transdermal every 24 hours  melatonin Liquid 12 milliGRAM(s) Oral <User Schedule>  meropenem  IVPB 1000 milliGRAM(s) IV Intermittent every 8 hours  multivitamin/minerals/iron Oral Solution (CENTRUM) 15 milliLiter(s) Enteral Tube daily  pantoprazole  Injectable 40 milliGRAM(s) IV Push every 12 hours  prednisoLONE acetate 1% Suspension 1 Drop(s) Both EYES every 6 hours  predniSONE   Tablet 5 milliGRAM(s) Oral <User Schedule>  QUEtiapine 12.5 milliGRAM(s) Oral <User Schedule>  simethicone 160 milliGRAM(s) Chew <User Schedule>  sodium chloride 1 Gram(s) Oral every 12 hours  sodium chloride 0.65% Nasal 1 Spray(s) Both Nostrils every 6 hours  witch hazel Pads 1 Application(s) Topical every 12 hours    MEDICATIONS  (PRN):  calcium carbonate    500 mG (Tums) Chewable 1 Tablet(s) Chew two times a day PRN Gas  diphenhydrAMINE Elixir 12.5 milliGRAM(s) Oral every 6 hours PRN Rash and/or Itching  oxyCODONE    IR 2.5 milliGRAM(s) Oral every 6 hours PRN Severe Pain (7 - 10)      LABS:                        8.4    14.26 )-----------( 111      ( 16 Oct 2024 06:50 )             30.2     10-16    139  |  109[H]  |  17  ----------------------------<  205[H]  5.0   |  22  |  <0.30[L]    Ca    8.0[L]      16 Oct 2024 06:51  Phos  2.9     10-16  Mg     2.1     10-16        Mode: AC/ CMV (Assist Control/ Continuous Mandatory Ventilation)  RR (machine): 18  TV (machine): 350  FiO2: 40  PEEP: 5  ITime: 1  MAP: 11  PIP: 37    < from: Xray Chest 1 View- PORTABLE-Urgent (Xray Chest 1 View- PORTABLE-Urgent .) (10.15.24 @ 15:47) >  IMPRESSION:    Interval decrease in bilateral patchy opacities with improved aeration of   the bilateral lung fields. Trace bilateral pleural effusions and/or   bibasilar atelectasis. Low lung volumes.    < end of copied text >

## 2024-10-16 NOTE — PROGRESS NOTE ADULT - PROBLEM SELECTOR PLAN 1
- Patient with Chronic Trach at baseline   - Current Vent Settings: Volume AC :  RR: 18 PEEP: 5 FIO2: 40%  - CXR 10/7: Opacification of the right middle lobe may represent pneumonia versus atelectasis  - Received Rocephin and Azithro in the ED   - Sputum cx 10/8: PSA  - Completed course of Cefepime ended on 0/12  - ID continues to follow  - Fever post PEJ change, ^procalcitonin, leukocytosis  - Placed on Meropenem 10/15

## 2024-10-16 NOTE — PROGRESS NOTE ADULT - ASSESSMENT
73 yo F PMH T2DM on insulin, HTN, HLD, hypothyroidism, RA on Prednisone, Fibromyalgia, cerebral aneurysm s/p repair, chronic hypercapnic respiratory failure s/p trach (vent dependent) and chronic PEG 2022, recurrent C. diff infection (follows with Dr. 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  Pt also noted to have rectal bleeding this past week requiring transfusion 5 days ago in ED as per daughter. Daughter also reports brownish discharge from G-J tube. In ED, pt afebrile, fiO2 96-98% on 40% on ventilator.  Chest X-ray w/ opacification of right lung concerning for possible PNA.  Admitted to RCU for further management. Sputum cxs grew PSA; Treated with course of Cefepime. Patient with decreased H+H received 1 unit of PRBCS on 10/10.        10/16: Received PEG-J exchange by GI via EGD 10/14. 10/15; Fever of 101.8, PAN cultured, ^ procalcitonin, possible source translocation during PEJ change. Placed on Meropenem with benadryl for rash.  Continue enteral feed regimen which coordinates with insulin dosing.

## 2024-10-16 NOTE — PROGRESS NOTE ADULT - NS ATTEND AMEND GEN_ALL_CORE FT
Pt is a 74F with MHx IDDM2, HTN, HLD, hypothyroidism, RA on Prednisone, fibromyalgia, cerebral aneurysm s/p repair, chronic hypoxemia and hypercapnic respiratory failure, tracheostomy and vent dependent, recurrent C. diff infection, chronic PEG 2022 with persistent GJ tube leaks now s/p GC fistula repair 8/12, recent presentation 5 days PTA for rectal bleeding requiring transfusion in the ED who now presents with acute hypoxemic respiratory distress likely 2/2 RML PNA admitted to the RCU for further management.       #Neuro - awake, alert, and interactive by mouthing words  - Was evaluated for hearing loss but unable to cooperate with audiogram - follow-up as outpatient  - History of anxiety and tardive which per daughter was treated and improved with Seroquel and Lexapro but now family noting some of these symptoms (clicking noises and unusual movements again). We discussed medication titration but this may also impact respiratory status  - Continue pain medications  #Cardiovascular - hemodynamically stable  #Pulmonary - chronic hypoxemic and hypercapnic respiratory failure on mechanical ventilation  - Has been vent dependent but daughter note that in March 2023 she was able to come off the vent for some time at [facility]. We discussed that this may not be possible but we will continue PSV trials as able and if SAFE. Attempted PSV 15/5 during rounds this AM but patient with low volumes and felt uncomfortable  - Has recently had some blood tinged secretions likely due to deep suctioning - will monitor output and minimize suctioning  - Sputum cultures with Pseudomonas in past - repeat sputum culture in lab. Xray reviewed with slight improvement in opacities  - Continue bronchodilators and airway clearance. Continue trach care.   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 Monitor for drainage  - Restarted feeds   - Patient noted to have anemia of unclear etiology and did receive PRBC during this admission with appropriate response. Has had prior melena which daughter felt was due to hemorrhoids. Follow-up GI  - Continue PPI  - Daughter has noted bloating and the G tube has been vented at times. Patient would benefit from having a venting system at home for G-tube as she does require G-tube venting to prevent abdominal distension  #Nephro - normal renal function  #Infectious Disease - completed course of antibiotics for bacterial pneumonia (RML) previously  - Patient now with fevers on 10/14 PM and 10/15 with elevated Procalcitonin - follow-up infectious workup in lab and continue Meropenem for now. Urinalysis was negative  - Given history will continue Meropenem for now - with understanding there is a risk given her history of c. diff  - ID evaluation noted  - Sacral wound > 2 years. Wound care follow-up  #Hem/Onc - monitor counts and transfuse as needed  - DVT proph with SCDs given recent bleeding and PRBC transfusion  #Endo - DM2 with hyperglycemia - continue insulin and adjust as needed for goal FS < 200  - Endocrine follow-up  #Rheum - RA with chronic pain - continue Prednisone and pain management.  - Would hold off Enbrel in setting of fevers      Long term prognosis guarded. Discussed with patient's  and aide at bedside today and all questions answered. RCU team to update patient's daughter over the phone as well.

## 2024-10-16 NOTE — PROGRESS NOTE ADULT - SUBJECTIVE AND OBJECTIVE BOX
Seen earlier today with aide and  at bedside and spoke with daughter on the phone     Chief Complaint: Diabetes Mellitus follow up    INTERVAL HX: Resting in bed, + tracheostomy with mechanical ventilation support. She nods yes or no and make needs known with mouthing words. Tolerating 12 hour tube feeding without vomiting or diarrhea. As per private aide, she was complaining nausea earlier and took pain medication for abdominal pain. Daughter reported that pt was having some break between each can tube feeding and also was having water via GJ tube 50-70 ml/hr, requested to change the tube feeding to home tube feeding as similar as possible. Daughter's request was discussed with team and dietitian. Team wanted to continue current continuous tube feeding 80 ml/hr for 12 hours while pt is in hospital for safety. Last 24 hour BGs 159-327. BGs mostly above goal except Fasting .       Review of Systems:  General: As above  GI: No vomiting  Endocrine: no  S&Sx of hypoglycemia    Allergies    walnut (Unknown)  metronidazole (Rash)  Lyrica (Unknown)  penicillin (Unknown)  Pineapple (Unknown)  Tagamet (Unknown)  heparin (Unknown)  Pecans (Unknown)  Hazelnut (Unknown)  meropenem (Rash)    Intolerances      MEDICATIONS  (STANDING):  albuterol/ipratropium for Nebulization 3 milliLiter(s) Nebulizer every 6 hours  artificial  tears Solution 1 Drop(s) Both EYES every 6 hours  ascorbic acid 500 milliGRAM(s) Oral daily  Biotene Dry Mouth Oral Rinse 5 milliLiter(s) Swish and Spit every 6 hours  calcium carbonate 1250 mG  + Vitamin D (OsCal 500 + D) 1 Tablet(s) Oral <User Schedule>  chlorhexidine 0.12% Liquid 15 milliLiter(s) Oral Mucosa every 12 hours  chlorhexidine 4% Liquid 1 Application(s) Topical daily  dextrose 5%. 1000 milliLiter(s) (100 mL/Hr) IV Continuous <Continuous>  dextrose 5%. 1000 milliLiter(s) (50 mL/Hr) IV Continuous <Continuous>  dextrose 50% Injectable 25 Gram(s) IV Push once  dextrose 50% Injectable 12.5 Gram(s) IV Push once  diclofenac sodium 1% Gel 2 Gram(s) Topical every 6 hours  diphenhydrAMINE Injectable 12.5 milliGRAM(s) IV Push <User Schedule>  escitalopram 10 milliGRAM(s) Oral <User Schedule>  fentaNYL   Patch  12 MICROgram(s)/Hr 1 Patch Transdermal every 72 hours  fentaNYL   Patch  25 MICROgram(s)/Hr 1 Patch Transdermal every 72 hours  gabapentin Solution 250 milliGRAM(s) Oral <User Schedule>  glucagon  Injectable 1 milliGRAM(s) IntraMuscular once  hemorrhoidal Ointment 1 Application(s) Rectal at bedtime  influenza  Vaccine (HIGH DOSE) 0.5 milliLiter(s) IntraMuscular once  insulin glargine Injectable (LANTUS) 20 Unit(s) SubCutaneous at bedtime  insulin lispro (ADMELOG) corrective regimen sliding scale   SubCutaneous <User Schedule>  insulin lispro (ADMELOG) corrective regimen sliding scale   SubCutaneous <User Schedule>  insulin lispro Injectable (ADMELOG) 30 Unit(s) SubCutaneous <User Schedule>  lactobacillus acidophilus 1 Tablet(s) Oral <User Schedule>  levothyroxine 125 MICROGram(s) Oral daily  lidocaine   4% Patch 1 Patch Transdermal every 24 hours  melatonin Liquid 12 milliGRAM(s) Oral <User Schedule>  meropenem  IVPB 1000 milliGRAM(s) IV Intermittent every 8 hours  multivitamin/minerals/iron Oral Solution (CENTRUM) 15 milliLiter(s) Enteral Tube daily  pantoprazole  Injectable 40 milliGRAM(s) IV Push every 12 hours  prednisoLONE acetate 1% Suspension 1 Drop(s) Both EYES every 6 hours  predniSONE   Tablet 5 milliGRAM(s) Oral <User Schedule>  QUEtiapine 12.5 milliGRAM(s) Oral <User Schedule>  simethicone 160 milliGRAM(s) Chew <User Schedule>  sodium chloride 1 Gram(s) Oral every 12 hours  sodium chloride 0.65% Nasal 1 Spray(s) Both Nostrils every 6 hours  witch hazel Pads 1 Application(s) Topical every 12 hours        insulin glargine Injectable (LANTUS)   20 Unit(s) SubCutaneous (10-15-24 @ 22:22)    insulin lispro (ADMELOG) corrective regimen sliding scale   4 Unit(s) SubCutaneous (10-16-24 @ 12:22)   2 Unit(s) SubCutaneous (10-16-24 @ 09:14)   10 Unit(s) SubCutaneous (10-15-24 @ 19:58)   8 Unit(s) SubCutaneous (10-15-24 @ 16:03)    insulin lispro (ADMELOG) corrective regimen sliding scale   3 Unit(s) SubCutaneous (10-16-24 @ 01:55)    insulin lispro Injectable (ADMELOG)   26 Unit(s) SubCutaneous (10-16-24 @ 12:23)   26 Unit(s) SubCutaneous (10-16-24 @ 09:15)    insulin lispro Injectable (ADMELOG)   20 Unit(s) SubCutaneous (10-15-24 @ 16:02)    levothyroxine   125 MICROGram(s) Oral (10-16-24 @ 05:49)    predniSONE   Tablet   5 milliGRAM(s) Oral (10-16-24 @ 10:31)   5 milliGRAM(s) Oral (10-15-24 @ 17:18)        PHYSICAL EXAM:  VITALS: T(C): 37.8 (10-16-24 @ 11:48)  T(F): 100.1 (10-16-24 @ 11:48), Max: 100.3 (10-15-24 @ 14:45)  HR: 106 (10-16-24 @ 11:48) (86 - 112)  BP: 107/54 (10-16-24 @ 11:48) (107/54 - 144/66)  RR:  (18 - 18)  SpO2:  (99% - 100%)  Wt(kg): --  GENERAL: Female laying in bed, in NAD  Respiratory: + Trach and ventilator support   Extremities: Warm, edema in arms  NEURO: Alert ,follows simple commands, mouthing words     LABS:  POCT Blood Glucose.: 215 mg/dL (10-16-24 @ 11:50)  POCT Blood Glucose.: 159 mg/dL (10-16-24 @ 08:52)  POCT Blood Glucose.: 293 mg/dL (10-16-24 @ 01:52)  POCT Blood Glucose.: 327 mg/dL (10-15-24 @ 22:19)  POCT Blood Glucose.: 355 mg/dL (10-15-24 @ 19:52)  POCT Blood Glucose.: 307 mg/dL (10-15-24 @ 15:52)  POCT Blood Glucose.: 319 mg/dL (10-15-24 @ 12:07)  POCT Blood Glucose.: 240 mg/dL (10-15-24 @ 06:27)  POCT Blood Glucose.: 353 mg/dL (10-14-24 @ 23:48)  POCT Blood Glucose.: 291 mg/dL (10-14-24 @ 18:03)  POCT Blood Glucose.: 176 mg/dL (10-14-24 @ 14:39)  POCT Blood Glucose.: 188 mg/dL (10-14-24 @ 06:28)  POCT Blood Glucose.: 249 mg/dL (10-14-24 @ 01:59)  POCT Blood Glucose.: 327 mg/dL (10-13-24 @ 21:19)  POCT Blood Glucose.: 413 mg/dL (10-13-24 @ 17:18)                          8.4    14.26 )-----------( 111      ( 16 Oct 2024 06:50 )             30.2     10-16    139  |  109[H]  |  17  ----------------------------<  205[H]  5.0   |  22  |  <0.30[L]    Ca    8.0[L]      16 Oct 2024 06:51  Phos  2.9     10-16  Mg     2.1     10-16      eGFR: 113 mL/min/1.73m2 (16 Oct 2024 06:51)      Thyroid Function Tests:  10-16 @ 06:50 TSH 1.74 FreeT4 1.2 T3 -- Anti TPO -- Anti Thyroglobulin Ab -- TSI --      A1C with Estimated Average Glucose Result: 5.8 % (10-14-24 @ 05:08)  A1C with Estimated Average Glucose Result: 6.4 % (08-12-24 @ 07:32)    Estimated Average Glucose: 120 mg/dL (10-14-24 @ 05:08)  Estimated Average Glucose: 137 mg/dL (08-12-24 @ 07:32)        Diet, NPO with Tube Feed:   Tube Feeding Modality: Jejunostomy  [place] Peptide 1.5 (KFPEPT1.5RTH)  Total Volume for 24 Hours (mL): 975  Bolus  Total Volume of Bolus (mL):  325  Total # of Feeds: 3  Tube Feed Frequency: Every 8 hours   Tube Feed Start Time: 08:00  Bolus Feed Rate (mL per Hour): 80   Bolus Feed Duration (in Hours): 4  Bolus Feed Instructions:  Please provide 80ml/hr x 4 hours starting at 08:00, 13:00, 18:00 (10-16-24 @ 12:03) [Pending Verification By Attending]  Diet, NPO with Tube Feed:   Tube Feeding Modality: Jejunostomy  Groov[place] Peptide 1.5 (KFPEPT1.5RTH)  Total Volume for 24 Hours (mL): 960  Continuous  Until Goal Tube Feed Rate (mL per Hour): 80  Tube Feed Duration (in Hours): 12  Tube Feed Start Time: 08:00   Rate (mL per Hour): 70   Frequency: Every Hour  Free Water Flush Instructions:  20 cc q 1 hr to prevent j-tube from clogging  Alfred(7 Gm Arginine/7 Gm Glut/1.2 Gm HMB     Qty per Day:  2  No Carb Prosource (1pkg = 15gms Protein)     Qty per Day:  1  Banatrol TF     Qty per Day:  1/2 packet 2 x daily (10-15-24 @ 13:01) [Active]  Diet, NPO with Tube Feed:   Tube Feeding Modality: Jejunostomy  Groovideo Peptide 1.5 (KFPEPT1.5RTH)  Total Volume for 24 Hours (mL): 990  Intermittent  Starting Tube Feed Rate mL per Hour: 45  Until Goal Tube Feed Rate (mL per Hour): 45  Tube Feeding Hours ON: 22  Tube Feeding OFF (Hours): 2  Tube Feed Start Time: 06:00   Rate (mL per Hour): 70   Frequency: Every Hour  Free Water Flush Instructions:  20 cc q 1 hr to prevent j-tube from clogging  Alfred(7 Gm Arginine/7 Gm Glut/1.2 Gm HMB     Qty per Day:  2  No Carb Prosource (1pkg = 15gms Protein)     Qty per Day:  1  Banatrol TF     Qty per Day:  1/2 packet 2 x daily (10-14-24 @ 16:28) [Pending Verification By Attending]

## 2024-10-16 NOTE — PROGRESS NOTE ADULT - PROBLEM SELECTOR PLAN 6
- Continue Standing/ Breakthrough Tylenol   - Continue Gabapentin / PRN Oxy   - Continue Fentanyl patches

## 2024-10-16 NOTE — PROGRESS NOTE ADULT - PROBLEM SELECTOR PLAN 4
normal... - Patient on Lantus and Humalog at home    - Continue Lantus /ISS / Humulin r   - Endocrine consult appreciated

## 2024-10-16 NOTE — PROGRESS NOTE ADULT - ASSESSMENT
75 yo F PMH T2DM on insulin, HTN, HLD, hypothyroidism, RA on Prednisone, Fibromyalgia, cerebral aneurysm s/p repair, chronic hypercapnic respiratory failure s/p trach (vent dependent) and chronic PEG 2022, recurrent C. 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 The stenosis was traversed.       The cardia and gastric fundus were normal.       A gastric tube with tip in the jejunum was found in the gastric body. The PEG required        removal because it was leaking. The J tube extension (12F) was removed first. An externally        removable 24 Fr EndoVive Safety gastrostomy tube was lubricated. The guide wire was passed        through the existing G-tube port and snared endoscopically. The endoscope and snare were then        removed, pulling the wire out through the mouth. The g-tube was tied to the guidewire, pulled        through the mouth into the stomach and then pulled out from the stomach through the skin. The        bumper was attached to the gastrostomy tube. The feeding tube was then cut to an appropriate        length. The final position of the gastrostomy tube was confirmed by relook endoscopy, and        skin marking noted to be 3 cm at the external bumper. The final tension and compression of        the abdominal wall by the PEG tube and external bumper were checked and revealed that the        bumper was moderately tight and mildly deforming the skin. The J tube extension (12 F        EndoVive Jejunal feeding tube) was advanced into the stomach. The tip of the J tube had a        loop thread that was captured with a Resolution clip. The thread and the J tube extension        were advanced to the proximal jejunum, and the endoclip along with the tip of the J tube was        attached to the wall securely. The J tube extension was capped, and the tube site was cleaned        and dressed.       A small fistula was found on the anterior wall of the gastric body related to prior G tube        site. Coagulation of tissue near the fistula using argon plasma at 1.2 liters/minute and 35        peraza was successful. To closure the gastrocutaneous fistula, four hemostatic clips were        successfully placed (MR conditional, two 16 mm DuraClip and 2 Mantis clips.       The examined duodenum was normal.    10/14, 10/15 Fever, transient hypoxia post procedure  10/15 ProCalcitonin = 8.48 suggestive of bacterial process; BCx1 NGTD  10;16 Leukocytosis WBC = 14.26    Suggest  Continue Meropenem for recurrent fever/toxicity  GI follow up  consider abd ct scan  wound care follow up    discussed with ACP

## 2024-10-17 NOTE — PROGRESS NOTE ADULT - SUBJECTIVE AND OBJECTIVE BOX
INTERVAL HPI/OVERNIGHT EVENTS:    Called to see patient again for fever.  s/p PEG-J extension change 4 days ago.  2 days ago started with low grade fever but today fever of 103 (oral).  Had been on antibiotics.  No obvious complains of abdominal pain.   Has history of C. diff colitis and is having diarrhea (non bloody). Daughter feels "its not C. diff like."  She has been placed on Vancomycin.  Today her J tube occluded.      MEDICATIONS  (STANDING):  albuterol/ipratropium for Nebulization 3 milliLiter(s) Nebulizer every 6 hours  artificial  tears Solution 1 Drop(s) Both EYES every 6 hours  ascorbic acid 500 milliGRAM(s) Oral daily  Biotene Dry Mouth Oral Rinse 5 milliLiter(s) Swish and Spit every 6 hours  calcium carbonate 1250 mG  + Vitamin D (OsCal 500 + D) 1 Tablet(s) Oral <User Schedule>  chlorhexidine 0.12% Liquid 15 milliLiter(s) Oral Mucosa every 12 hours  chlorhexidine 4% Liquid 1 Application(s) Topical daily  dextrose 5% + lactated ringers. 1000 milliLiter(s) (50 mL/Hr) IV Continuous <Continuous>  dextrose 5%. 1000 milliLiter(s) (100 mL/Hr) IV Continuous <Continuous>  dextrose 5%. 1000 milliLiter(s) (50 mL/Hr) IV Continuous <Continuous>  dextrose 50% Injectable 25 Gram(s) IV Push once  dextrose 50% Injectable 12.5 Gram(s) IV Push once  diclofenac sodium 1% Gel 2 Gram(s) Topical every 6 hours  diphenhydrAMINE Injectable 12.5 milliGRAM(s) IV Push <User Schedule>  escitalopram 10 milliGRAM(s) Oral <User Schedule>  fentaNYL   Patch  12 MICROgram(s)/Hr 1 Patch Transdermal every 72 hours  fentaNYL   Patch  25 MICROgram(s)/Hr 1 Patch Transdermal every 72 hours  gabapentin Solution 250 milliGRAM(s) Oral <User Schedule>  glucagon  Injectable 1 milliGRAM(s) IntraMuscular once  hemorrhoidal Ointment 1 Application(s) Rectal at bedtime  influenza  Vaccine (HIGH DOSE) 0.5 milliLiter(s) IntraMuscular once  insulin glargine Injectable (LANTUS) 20 Unit(s) SubCutaneous <User Schedule>  insulin lispro (ADMELOG) corrective regimen sliding scale   SubCutaneous <User Schedule>  insulin lispro (ADMELOG) corrective regimen sliding scale   SubCutaneous <User Schedule>  insulin lispro Injectable (ADMELOG) 30 Unit(s) SubCutaneous <User Schedule>  lactobacillus acidophilus 1 Tablet(s) Oral <User Schedule>  levothyroxine 125 MICROGram(s) Oral daily  lidocaine   4% Patch 1 Patch Transdermal every 24 hours  melatonin Liquid 12 milliGRAM(s) Oral <User Schedule>  meropenem  IVPB 1000 milliGRAM(s) IV Intermittent every 8 hours  midodrine 10 milliGRAM(s) Oral every 8 hours  multivitamin/minerals/iron Oral Solution (CENTRUM) 15 milliLiter(s) Enteral Tube daily  pantoprazole  Injectable 40 milliGRAM(s) IV Push every 12 hours  prednisoLONE acetate 1% Suspension 1 Drop(s) Both EYES every 6 hours  predniSONE   Tablet 5 milliGRAM(s) Oral <User Schedule>  QUEtiapine 12.5 milliGRAM(s) Oral <User Schedule>  simethicone 160 milliGRAM(s) Chew <User Schedule>  sodium chloride 1 Gram(s) Oral every 12 hours  sodium chloride 0.65% Nasal 1 Spray(s) Both Nostrils every 6 hours  vancomycin  IVPB 750 milliGRAM(s) IV Intermittent every 12 hours  witch hazel Pads 1 Application(s) Topical every 12 hours    MEDICATIONS  (PRN):  calcium carbonate    500 mG (Tums) Chewable 1 Tablet(s) Chew two times a day PRN Gas  diphenhydrAMINE Elixir 12.5 milliGRAM(s) Oral every 6 hours PRN Rash and/or Itching  oxyCODONE    IR 2.5 milliGRAM(s) Oral every 6 hours PRN Severe Pain (7 - 10)      Allergies    walnut (Unknown)  metronidazole (Rash)  Lyrica (Unknown)  penicillin (Unknown)  Pineapple (Unknown)  Tagamet (Unknown)  heparin (Unknown)  Pecans (Unknown)  Hazelnut (Unknown)  meropenem (Rash)    Intolerances        Vital Signs Last 24 Hrs  T(C): 36.7 (17 Oct 2024 19:26), Max: 39.4 (17 Oct 2024 08:17)  T(F): 98 (17 Oct 2024 19:26), Max: 103 (17 Oct 2024 08:17)  HR: 82 (17 Oct 2024 21:45) (82 - 125)  BP: 133/87 (17 Oct 2024 19:26) (87/44 - 160/82)  BP(mean): --  RR: 16 (17 Oct 2024 08:17) (16 - 18)  SpO2: 100% (17 Oct 2024 21:45) (98% - 100%)    Parameters below as of 17 Oct 2024 08:17  Patient On (Oxygen Delivery Method): ventilator    O2 Concentration (%): 40    PHYSICAL EXAM:  Constitutional: NAD, tracheostomy.  Responds appropriately.   Neck: No LAD, supple  Respiratory: CTAB  Cardiovascular: S1 and S2, RRR,   Gastrointestinal:soft, NT/ND, neg HSM,  GJ tube in place without purulent discharge.  No discharge from the gastrocutaneous fistula.  No rebound or guarding.  Extremities: No peripheral edema, neg clubing, cyanosis  rashes    LABS:                        9.5    15.02 )-----------( 124      ( 17 Oct 2024 07:20 )             34.3     10-17    139  |  107  |  15  ----------------------------<  160[H]  5.1   |  20[L]  |  <0.30[L]    Ca    8.4      17 Oct 2024 07:15  Phos  2.7     10-17  Mg     1.9     10-17        Urinalysis Basic - ( 17 Oct 2024 07:15 )    Color: x / Appearance: x / SG: x / pH: x  Gluc: 160 mg/dL / Ketone: x  / Bili: x / Urobili: x   Blood: x / Protein: x / Nitrite: x   Leuk Esterase: x / RBC: x / WBC x   Sq Epi: x / Non Sq Epi: x / Bacteria: x          RADIOLOGY & ADDITIONAL TESTS:

## 2024-10-17 NOTE — PROGRESS NOTE ADULT - NS ATTEND AMEND GEN_ALL_CORE FT
Pt is a 74F with MHx IDDM2, HTN, HLD, hypothyroidism, RA on Prednisone, fibromyalgia, cerebral aneurysm s/p repair, chronic hypoxemia and hypercapnic respiratory failure, tracheostomy and vent dependent, recurrent C. diff infection, chronic PEG 2022 with persistent GJ tube leaks now s/p GC fistula repair 8/12, recent presentation 5 days PTA for rectal bleeding requiring transfusion in the ED who now presents with acute hypoxemic respiratory distress likely 2/2 RML PNA admitted to the RCU for further management.       #Neuro - awake, alert, and interactive by mouthing words  - Was evaluated for hearing loss but unable to cooperate with audiogram - follow-up as outpatient  - History of anxiety and tardive which per daughter was treated and improved with Seroquel and Lexapro but now family noting some of these symptoms (clicking noises and unusual movements again). We discussed medication titration but this may also impact respiratory status  - Continue pain medications  #Cardiovascular - hemodynamically stable  #Pulmonary - chronic hypoxemic and hypercapnic respiratory failure on mechanical ventilation  - Has been vent dependent but daughter note that in March 2023 she was able to come off the vent for some time at [facility]. We discussed that this may not be possible but we will continue PSV trials as able and if SAFE. Attempted PSV 15/5 during rounds this AM but patient with low volumes and felt uncomfortable  - Has recently had some blood tinged secretions likely due to deep suctioning - will monitor output and minimize suctioning  - Sputum cultures with Pseudomonas in past - repeat sputum culture in lab. Xray reviewed with slight improvement in opacities  - Continue bronchodilators and airway clearance. Continue trach care.   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 Monitor for drainage  - Restarted feeds   - Patient noted to have anemia of unclear etiology and did receive PRBC during this admission with appropriate response. Has had prior melena which daughter felt was due to hemorrhoids. Follow-up GI  - Continue PPI  - Daughter has noted bloating and the G tube has been vented at times. Patient would benefit from having a venting system at home for G-tube as she does require G-tube venting to prevent abdominal distension  #Nephro - normal renal function  #Infectious Disease - completed course of antibiotics for bacterial pneumonia (RML) previously  - Patient now with fevers on 10/14 PM and 10/15 with elevated Procalcitonin - follow-up infectious workup in lab and continue Meropenem for now. Urinalysis was negative  - Given history will continue Meropenem for now - with understanding there is a risk given her history of c. diff  - ID evaluation noted  - Sacral wound > 2 years. Wound care follow-up  #Hem/Onc - monitor counts and transfuse as needed  - DVT proph with SCDs given recent bleeding and PRBC transfusion  #Endo - DM2 with hyperglycemia - continue insulin and adjust as needed for goal FS < 200  - Endocrine follow-up  #Rheum - RA with chronic pain - continue Prednisone and pain management.  - Would hold off Enbrel in setting of fevers      Long term prognosis guarded. Discussed with patient's  and aide at bedside today and all questions answered. RCU team to update patient's daughter over the phone as well. Pt is a 74F with MHx IDDM2, HTN, HLD, hypothyroidism, RA on Prednisone, fibromyalgia, cerebral aneurysm s/p repair, chronic hypoxemia and hypercapnic respiratory failure, tracheostomy and vent dependent, recurrent C. diff infection, chronic PEG 2022 with persistent GJ tube leaks now s/p GC fistula repair 8/12, recent presentation 5 days PTA for rectal bleeding requiring transfusion in the ED who now presents with acute hypoxemic respiratory distress likely 2/2 RML PNA admitted to the RCU for further management.     This AM patient with worsening fevers and hypotension. Labs note an increased procalcitonin and slowly rising WBC count. She is having more diarrhea and has an extensive c. diff history. Will broaden antibiotics, start midodrine, and bolus IVF. If patient's BP does not improve with resuscitation may require IV vasopressors.      #Neuro - awake, alert, and interactive by mouthing words  - Was evaluated for hearing loss but unable to cooperate with audiogram - follow-up as outpatient  - History of anxiety and tardive which per daughter was treated and improved with Seroquel and Lexapro but now family noting some of these symptoms (clicking noises and unusual movements again). We discussed medication titration but this may also impact respiratory status  - Continue pain medications as able based on BP  #Cardiovascular - hypotensive around mid day today in setting of high fevers and likely worsening infection  - IVF resuscitation and start Midodrine  #Pulmonary - chronic hypoxemic and hypercapnic respiratory failure on mechanical ventilation  - Has been vent dependent but daughter note that in March 2023 she was able to come off the vent for some time at [facility]. We discussed that this may not be possible but we will continue PSV trials as able and if SAFE. Attempted PSV 15/5 during rounds again this AM but patient with low volumes (about 2-3L decrease in minute ventilation) and given her worsening infectious status will hold off further PSV today  - Has recently had some blood tinged secretions likely due to deep suctioning - will monitor output and minimize suctioning  - Sputum cultures with Pseudomonas in past - repeat sputum culture in lab. Xray reviewed with slight improvement in opacities  - Continue bronchodilators and airway clearance. Continue trach care.   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 Monitor for drainage  - Restarted feeds   - Patient noted to have anemia of unclear etiology and did receive PRBC during this admission with appropriate response. Has had prior melena which daughter felt was due to hemorrhoids. Follow-up GI  - Continue PPI  - Daughter has noted bloating and the G tube has been vented at times. Patient would benefit from having a venting system at home for G-tube as she does require G-tube venting to prevent abdominal distension  #Nephro - normal renal function  #Infectious Disease - completed course of antibiotics for bacterial pneumonia (RML) previously  - Patient with fever 103 this AM and subsequently with hypotension - will broaden antibiotics and check CT c/a/p if able to do this safely  - Given history will continue Meropenem for now - with understanding there is a risk given her history of c. diff  - ID follow-up - given increased diarrhea should we empirically treat for c. diff or repeat testing (?)  - Sacral wound > 2 years. Wound care follow-up  #Hem/Onc - monitor counts and transfuse as needed  - DVT proph with SCDs given recent bleeding and PRBC transfusion  #Endo - DM2 with hyperglycemia - continue insulin and adjust as needed for goal FS < 200  - Endocrine follow-up  - Will continue current feeds and make adjustments to timing if this can be done safely  #Rheum - RA with chronic pain - continue Prednisone and pain management.  - Would hold off Enbrel in setting of fevers      Long term prognosis guarded. Discussed with patient's  and aide at bedside today. I also spoke with patient's daughter, Marysol, over the phone this AM during rounds. We discussed patient's worsening fevers and planned workup related to this.

## 2024-10-17 NOTE — PROGRESS NOTE ADULT - ASSESSMENT
In summary, 75 yo female with numerous medical issues with vent dept related to respiratory failure, PEG-J extensions, DM II, HTN, HLD, hypothyroidism, RA on Prednisone, Fibromyalgia, cerebral aneurysm s/p repair, recurrent C. diff infection, with admission of hypoxia, rectal bleeding now s/p replacement of PEG-J and repeat closure of GC fistula who is now febrile with leukocytosis.     Obstructed J tube as well - likely secondary to crushed medications use through the tube.    Plan:    Please obtain CT Chest, abd pelvis today  Will have to change J tube extension, pending results of CT scan    Anil Rosales MD

## 2024-10-17 NOTE — PROGRESS NOTE ADULT - SUBJECTIVE AND OBJECTIVE BOX
Patient is a 74y old  Female who presents with a chief complaint of Patient admitted with Hypoxemia and episode of Bright red blood per rectum (16 Oct 2024 18:20)      Interval Events:    REVIEW OF SYSTEMS:  [ ] Positive  [ ] All other systems negative  [ ] Unable to assess ROS because ________    Vital Signs Last 24 Hrs  T(C): 37.5 (10-17-24 @ 05:03), Max: 37.8 (10-16-24 @ 11:48)  T(F): 99.5 (10-17-24 @ 05:03), Max: 100.1 (10-16-24 @ 11:48)  HR: 100 (10-17-24 @ 05:55) (86 - 125)  BP: 130/80 (10-17-24 @ 05:15) (105/49 - 162/75)  RR: 18 (10-17-24 @ 05:03) (18 - 18)  SpO2: 100% (10-17-24 @ 05:55) (100% - 100%)    PHYSICAL EXAM:  HEENT:   [ ]Tracheostomy:  [ ]Pupils equal  [ ]No oral lesions  [ ]Abnormal    SKIN  [ ]No Rash  [ ] Abnormal  [ ] pressure    CARDIAC  [ ]Regular  [ ]Abnormal    PULMONARY  [ ]Bilateral Clear Breath Sounds  [ ]Normal Excursion  [ ]Abnormal    GI  [ ]PEG      [ ] +BS		              [ ]Soft, nondistended, nontender	  [ ]Abnormal    MUSCULOSKELETAL                                   [ ]Bedbound                 [ ]Abnormal    [ ]Ambulatory/OOB to chair                           EXTREMITIES                                         [ ]Normal  [ ]Edema                           NEUROLOGIC  [ ] Normal, non focal  [ ] Focal findings:    PSYCHIATRIC  [ ]Alert and appropriate  [ ] Sedated	 [ ]Agitated    :  Mcbride: [ ] Yes, if yes: Date of Placement:                   [  ] No    LINES: Central Lines [ ] Yes, if yes: Date of Placement                                     [  ] No    HOSPITAL MEDICATIONS:  MEDICATIONS  (STANDING):  albuterol/ipratropium for Nebulization 3 milliLiter(s) Nebulizer every 6 hours  artificial  tears Solution 1 Drop(s) Both EYES every 6 hours  ascorbic acid 500 milliGRAM(s) Oral daily  Biotene Dry Mouth Oral Rinse 5 milliLiter(s) Swish and Spit every 6 hours  calcium carbonate 1250 mG  + Vitamin D (OsCal 500 + D) 1 Tablet(s) Oral <User Schedule>  chlorhexidine 0.12% Liquid 15 milliLiter(s) Oral Mucosa every 12 hours  chlorhexidine 4% Liquid 1 Application(s) Topical daily  dextrose 5%. 1000 milliLiter(s) (100 mL/Hr) IV Continuous <Continuous>  dextrose 5%. 1000 milliLiter(s) (50 mL/Hr) IV Continuous <Continuous>  dextrose 50% Injectable 25 Gram(s) IV Push once  dextrose 50% Injectable 12.5 Gram(s) IV Push once  diclofenac sodium 1% Gel 2 Gram(s) Topical every 6 hours  diphenhydrAMINE Injectable 12.5 milliGRAM(s) IV Push <User Schedule>  escitalopram 10 milliGRAM(s) Oral <User Schedule>  fentaNYL   Patch  12 MICROgram(s)/Hr 1 Patch Transdermal every 72 hours  fentaNYL   Patch  25 MICROgram(s)/Hr 1 Patch Transdermal every 72 hours  gabapentin Solution 250 milliGRAM(s) Oral <User Schedule>  glucagon  Injectable 1 milliGRAM(s) IntraMuscular once  hemorrhoidal Ointment 1 Application(s) Rectal at bedtime  influenza  Vaccine (HIGH DOSE) 0.5 milliLiter(s) IntraMuscular once  insulin glargine Injectable (LANTUS) 20 Unit(s) SubCutaneous at bedtime  insulin lispro (ADMELOG) corrective regimen sliding scale   SubCutaneous <User Schedule>  insulin lispro (ADMELOG) corrective regimen sliding scale   SubCutaneous <User Schedule>  insulin lispro Injectable (ADMELOG) 30 Unit(s) SubCutaneous <User Schedule>  lactobacillus acidophilus 1 Tablet(s) Oral <User Schedule>  levothyroxine 125 MICROGram(s) Oral daily  lidocaine   4% Patch 1 Patch Transdermal every 24 hours  melatonin Liquid 12 milliGRAM(s) Oral <User Schedule>  meropenem  IVPB 1000 milliGRAM(s) IV Intermittent every 8 hours  multivitamin/minerals/iron Oral Solution (CENTRUM) 15 milliLiter(s) Enteral Tube daily  pantoprazole  Injectable 40 milliGRAM(s) IV Push every 12 hours  prednisoLONE acetate 1% Suspension 1 Drop(s) Both EYES every 6 hours  predniSONE   Tablet 5 milliGRAM(s) Oral <User Schedule>  QUEtiapine 12.5 milliGRAM(s) Oral <User Schedule>  simethicone 160 milliGRAM(s) Chew <User Schedule>  sodium chloride 1 Gram(s) Oral every 12 hours  sodium chloride 0.65% Nasal 1 Spray(s) Both Nostrils every 6 hours  witch hazel Pads 1 Application(s) Topical every 12 hours    MEDICATIONS  (PRN):  calcium carbonate    500 mG (Tums) Chewable 1 Tablet(s) Chew two times a day PRN Gas  diphenhydrAMINE Elixir 12.5 milliGRAM(s) Oral every 6 hours PRN Rash and/or Itching  oxyCODONE    IR 2.5 milliGRAM(s) Oral every 6 hours PRN Severe Pain (7 - 10)      LABS:                        8.4    14.26 )-----------( 111      ( 16 Oct 2024 06:50 )             30.2     10-16    139  |  109[H]  |  17  ----------------------------<  205[H]  5.0   |  22  |  <0.30[L]    Ca    8.0[L]      16 Oct 2024 06:51  Phos  2.9     10-16  Mg     2.1     10-16        Urinalysis Basic - ( 16 Oct 2024 06:51 )    Color: x / Appearance: x / SG: x / pH: x  Gluc: 205 mg/dL / Ketone: x  / Bili: x / Urobili: x   Blood: x / Protein: x / Nitrite: x   Leuk Esterase: x / RBC: x / WBC x   Sq Epi: x / Non Sq Epi: x / Bacteria: x          CAPILLARY BLOOD GLUCOSE    MICROBIOLOGY:     RADIOLOGY:  [ ] Reviewed and interpreted by me    Mode: AC/ CMV (Assist Control/ Continuous Mandatory Ventilation)  RR (machine): 18  TV (machine): 350  FiO2: 40  PEEP: 5  ITime: 1  MAP: 10  PIP: 32   Patient is a 74y old  Female who presents with a chief complaint of Patient admitted with Hypoxemia and episode of Bright red blood per rectum (16 Oct 2024 18:20)    Interval Events:  No acute events overnight.     REVIEW OF SYSTEMS:  [ ] Positive  [X] All other systems negative  [ ] Unable to assess ROS because ________    Vital Signs Last 24 Hrs  T(C): 37.5 (10-17-24 @ 05:03), Max: 37.8 (10-16-24 @ 11:48)  T(F): 99.5 (10-17-24 @ 05:03), Max: 100.1 (10-16-24 @ 11:48)  HR: 100 (10-17-24 @ 05:55) (86 - 125)  BP: 130/80 (10-17-24 @ 05:15) (105/49 - 162/75)  RR: 18 (10-17-24 @ 05:03) (18 - 18)  SpO2: 100% (10-17-24 @ 05:55) (100% - 100%)    PHYSICAL EXAM:  HEENT:   [ ]Tracheostomy:  [ ]Pupils equal  [ ]No oral lesions  [ ]Abnormal    SKIN  [ ]No Rash  [ ] Abnormal  [ ] pressure    CARDIAC  [ ]Regular  [ ]Abnormal    PULMONARY  [ ]Bilateral Clear Breath Sounds  [ ]Normal Excursion  [ ]Abnormal    GI  [ ]PEG      [ ] +BS		              [ ]Soft, nondistended, nontender	  [ ]Abnormal    MUSCULOSKELETAL                                   [ ]Bedbound                 [ ]Abnormal    [ ]Ambulatory/OOB to chair                           EXTREMITIES                                         [ ]Normal  [ ]Edema                           NEUROLOGIC  [ ] Normal, non focal  [ ] Focal findings:    PSYCHIATRIC  [ ]Alert and appropriate  [ ] Sedated	 [ ]Agitated    :  Mcbride: [ ] Yes, if yes: Date of Placement:                   [  ] No    LINES: Central Lines [ ] Yes, if yes: Date of Placement                                     [  ] No    HOSPITAL MEDICATIONS:  MEDICATIONS  (STANDING):  albuterol/ipratropium for Nebulization 3 milliLiter(s) Nebulizer every 6 hours  artificial  tears Solution 1 Drop(s) Both EYES every 6 hours  ascorbic acid 500 milliGRAM(s) Oral daily  Biotene Dry Mouth Oral Rinse 5 milliLiter(s) Swish and Spit every 6 hours  calcium carbonate 1250 mG  + Vitamin D (OsCal 500 + D) 1 Tablet(s) Oral <User Schedule>  chlorhexidine 0.12% Liquid 15 milliLiter(s) Oral Mucosa every 12 hours  chlorhexidine 4% Liquid 1 Application(s) Topical daily  dextrose 5%. 1000 milliLiter(s) (100 mL/Hr) IV Continuous <Continuous>  dextrose 5%. 1000 milliLiter(s) (50 mL/Hr) IV Continuous <Continuous>  dextrose 50% Injectable 25 Gram(s) IV Push once  dextrose 50% Injectable 12.5 Gram(s) IV Push once  diclofenac sodium 1% Gel 2 Gram(s) Topical every 6 hours  diphenhydrAMINE Injectable 12.5 milliGRAM(s) IV Push <User Schedule>  escitalopram 10 milliGRAM(s) Oral <User Schedule>  fentaNYL   Patch  12 MICROgram(s)/Hr 1 Patch Transdermal every 72 hours  fentaNYL   Patch  25 MICROgram(s)/Hr 1 Patch Transdermal every 72 hours  gabapentin Solution 250 milliGRAM(s) Oral <User Schedule>  glucagon  Injectable 1 milliGRAM(s) IntraMuscular once  hemorrhoidal Ointment 1 Application(s) Rectal at bedtime  influenza  Vaccine (HIGH DOSE) 0.5 milliLiter(s) IntraMuscular once  insulin glargine Injectable (LANTUS) 20 Unit(s) SubCutaneous at bedtime  insulin lispro (ADMELOG) corrective regimen sliding scale   SubCutaneous <User Schedule>  insulin lispro (ADMELOG) corrective regimen sliding scale   SubCutaneous <User Schedule>  insulin lispro Injectable (ADMELOG) 30 Unit(s) SubCutaneous <User Schedule>  lactobacillus acidophilus 1 Tablet(s) Oral <User Schedule>  levothyroxine 125 MICROGram(s) Oral daily  lidocaine   4% Patch 1 Patch Transdermal every 24 hours  melatonin Liquid 12 milliGRAM(s) Oral <User Schedule>  meropenem  IVPB 1000 milliGRAM(s) IV Intermittent every 8 hours  multivitamin/minerals/iron Oral Solution (CENTRUM) 15 milliLiter(s) Enteral Tube daily  pantoprazole  Injectable 40 milliGRAM(s) IV Push every 12 hours  prednisoLONE acetate 1% Suspension 1 Drop(s) Both EYES every 6 hours  predniSONE   Tablet 5 milliGRAM(s) Oral <User Schedule>  QUEtiapine 12.5 milliGRAM(s) Oral <User Schedule>  simethicone 160 milliGRAM(s) Chew <User Schedule>  sodium chloride 1 Gram(s) Oral every 12 hours  sodium chloride 0.65% Nasal 1 Spray(s) Both Nostrils every 6 hours  witch hazel Pads 1 Application(s) Topical every 12 hours    MEDICATIONS  (PRN):  calcium carbonate    500 mG (Tums) Chewable 1 Tablet(s) Chew two times a day PRN Gas  diphenhydrAMINE Elixir 12.5 milliGRAM(s) Oral every 6 hours PRN Rash and/or Itching  oxyCODONE    IR 2.5 milliGRAM(s) Oral every 6 hours PRN Severe Pain (7 - 10)    LABS:                        8.4    14.26 )-----------( 111      ( 16 Oct 2024 06:50 )             30.2     10-16    139  |  109[H]  |  17  ----------------------------<  205[H]  5.0   |  22  |  <0.30[L]    Ca    8.0[L]      16 Oct 2024 06:51  Phos  2.9     10-16  Mg     2.1     10-16    Urinalysis Basic - ( 16 Oct 2024 06:51 )    Color: x / Appearance: x / SG: x / pH: x  Gluc: 205 mg/dL / Ketone: x  / Bili: x / Urobili: x   Blood: x / Protein: x / Nitrite: x   Leuk Esterase: x / RBC: x / WBC x   Sq Epi: x / Non Sq Epi: x / Bacteria: x    CAPILLARY BLOOD GLUCOSE    MICROBIOLOGY:     RADIOLOGY:  [ ] Reviewed and interpreted by me    Mode: AC/ CMV (Assist Control/ Continuous Mandatory Ventilation)  RR (machine): 18  TV (machine): 350  FiO2: 40  PEEP: 5  ITime: 1  MAP: 10  PIP: 32 Patient is a 74y old  Female who presents with a chief complaint of Patient admitted with Hypoxemia and episode of Bright red blood per rectum (16 Oct 2024 18:20)    Interval Events:  No acute events overnight.     REVIEW OF SYSTEMS:  [ ] Positive  [X] All other systems negative  [ ] Unable to assess ROS because ________    Vital Signs Last 24 Hrs  T(C): 37.5 (10-17-24 @ 05:03), Max: 37.8 (10-16-24 @ 11:48)  T(F): 99.5 (10-17-24 @ 05:03), Max: 100.1 (10-16-24 @ 11:48)  HR: 100 (10-17-24 @ 05:55) (86 - 125)  BP: 130/80 (10-17-24 @ 05:15) (105/49 - 162/75)  RR: 18 (10-17-24 @ 05:03) (18 - 18)  SpO2: 100% (10-17-24 @ 05:55) (100% - 100%)    PHYSICAL EXAM:  HEENT:   [X]Tracheostomy: #8 XLT shiley   [X]Pupils equal  [ ]No oral lesions  [ ]Abnormal    SKIN  [ ]No Rash  [X] Abnormal - IAD, MAD at old PEG site  [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    CARDIAC  [X]Regular  [X]Abnormal - intermittent tachycardia    PULMONARY  [ ]Bilateral Clear Breath Sounds  [ ]Normal Excursion  [X]Abnormal - BL coarse BS    GI  [XPEG      [X] +BS		              [X] soft, nondistended, nontender	  [X]Abnormal - old PEG site dressing c/d/i    MUSCULOSKELETAL                                   [X]Bedbound                 [ ]Abnormal    [ ]Ambulatory/OOB to chair                           EXTREMITIES                                         [ ]Normal  [X]Edema - mild generalized edema                  NEUROLOGIC  [ ] Normal, non focal  [X] Focal findings: lethargic but easily arousable, follows commands     PSYCHIATRIC  [X] lethargic but appropriate  [ ] Sedated	 [ ]Agitated    :  Mcbride: [ ] Yes, if yes: Date of Placement:                   [X] No    LINES: Central Lines [ ] Yes, if yes: Date of Placement                                     [X] No    HOSPITAL MEDICATIONS:  MEDICATIONS  (STANDING):  albuterol/ipratropium for Nebulization 3 milliLiter(s) Nebulizer every 6 hours  artificial  tears Solution 1 Drop(s) Both EYES every 6 hours  ascorbic acid 500 milliGRAM(s) Oral daily  Biotene Dry Mouth Oral Rinse 5 milliLiter(s) Swish and Spit every 6 hours  calcium carbonate 1250 mG  + Vitamin D (OsCal 500 + D) 1 Tablet(s) Oral <User Schedule>  chlorhexidine 0.12% Liquid 15 milliLiter(s) Oral Mucosa every 12 hours  chlorhexidine 4% Liquid 1 Application(s) Topical daily  dextrose 5%. 1000 milliLiter(s) (100 mL/Hr) IV Continuous <Continuous>  dextrose 5%. 1000 milliLiter(s) (50 mL/Hr) IV Continuous <Continuous>  dextrose 50% Injectable 25 Gram(s) IV Push once  dextrose 50% Injectable 12.5 Gram(s) IV Push once  diclofenac sodium 1% Gel 2 Gram(s) Topical every 6 hours  diphenhydrAMINE Injectable 12.5 milliGRAM(s) IV Push <User Schedule>  escitalopram 10 milliGRAM(s) Oral <User Schedule>  fentaNYL   Patch  12 MICROgram(s)/Hr 1 Patch Transdermal every 72 hours  fentaNYL   Patch  25 MICROgram(s)/Hr 1 Patch Transdermal every 72 hours  gabapentin Solution 250 milliGRAM(s) Oral <User Schedule>  glucagon  Injectable 1 milliGRAM(s) IntraMuscular once  hemorrhoidal Ointment 1 Application(s) Rectal at bedtime  influenza  Vaccine (HIGH DOSE) 0.5 milliLiter(s) IntraMuscular once  insulin glargine Injectable (LANTUS) 20 Unit(s) SubCutaneous at bedtime  insulin lispro (ADMELOG) corrective regimen sliding scale   SubCutaneous <User Schedule>  insulin lispro (ADMELOG) corrective regimen sliding scale   SubCutaneous <User Schedule>  insulin lispro Injectable (ADMELOG) 30 Unit(s) SubCutaneous <User Schedule>  lactobacillus acidophilus 1 Tablet(s) Oral <User Schedule>  levothyroxine 125 MICROGram(s) Oral daily  lidocaine   4% Patch 1 Patch Transdermal every 24 hours  melatonin Liquid 12 milliGRAM(s) Oral <User Schedule>  meropenem  IVPB 1000 milliGRAM(s) IV Intermittent every 8 hours  multivitamin/minerals/iron Oral Solution (CENTRUM) 15 milliLiter(s) Enteral Tube daily  pantoprazole  Injectable 40 milliGRAM(s) IV Push every 12 hours  prednisoLONE acetate 1% Suspension 1 Drop(s) Both EYES every 6 hours  predniSONE   Tablet 5 milliGRAM(s) Oral <User Schedule>  QUEtiapine 12.5 milliGRAM(s) Oral <User Schedule>  simethicone 160 milliGRAM(s) Chew <User Schedule>  sodium chloride 1 Gram(s) Oral every 12 hours  sodium chloride 0.65% Nasal 1 Spray(s) Both Nostrils every 6 hours  witch hazel Pads 1 Application(s) Topical every 12 hours    MEDICATIONS  (PRN):  calcium carbonate    500 mG (Tums) Chewable 1 Tablet(s) Chew two times a day PRN Gas  diphenhydrAMINE Elixir 12.5 milliGRAM(s) Oral every 6 hours PRN Rash and/or Itching  oxyCODONE    IR 2.5 milliGRAM(s) Oral every 6 hours PRN Severe Pain (7 - 10)    LABS:                        8.4    14.26 )-----------( 111      ( 16 Oct 2024 06:50 )             30.2     10-16    139  |  109[H]  |  17  ----------------------------<  205[H]  5.0   |  22  |  <0.30[L]    Ca    8.0[L]      16 Oct 2024 06:51  Phos  2.9     10-16  Mg     2.1     10-16    Urinalysis Basic - ( 16 Oct 2024 06:51 )    Color: x / Appearance: x / SG: x / pH: x  Gluc: 205 mg/dL / Ketone: x  / Bili: x / Urobili: x   Blood: x / Protein: x / Nitrite: x   Leuk Esterase: x / RBC: x / WBC x   Sq Epi: x / Non Sq Epi: x / Bacteria: x    CAPILLARY BLOOD GLUCOSE    MICROBIOLOGY:     RADIOLOGY:  [ ] Reviewed and interpreted by me    Mode: AC/ CMV (Assist Control/ Continuous Mandatory Ventilation)  RR (machine): 18  TV (machine): 350  FiO2: 40  PEEP: 5  ITime: 1  MAP: 10  PIP: 32

## 2024-10-17 NOTE — PROGRESS NOTE ADULT - PROBLEM SELECTOR PLAN 4
- Patient on Lantus and Humalog at home    - Continue Lantus /ISS / Humulin r   - Endocrine consult appreciated

## 2024-10-17 NOTE — PROGRESS NOTE ADULT - ASSESSMENT
75 yo F PMH T2DM on insulin, HTN, HLD, hypothyroidism, RA on Prednisone, Fibromyalgia, cerebral aneurysm s/p repair, chronic hypercapnic respiratory failure s/p trach (vent dependent) and chronic PEG 2022, recurrent C. 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 The stenosis was traversed.       The cardia and gastric fundus were normal.       A gastric tube with tip in the jejunum was found in the gastric body. The PEG required        removal because it was leaking. The J tube extension (12F) was removed first. An externally        removable 24 Fr EndoVive Safety gastrostomy tube was lubricated. The guide wire was passed        through the existing G-tube port and snared endoscopically. The endoscope and snare were then        removed, pulling the wire out through the mouth. The g-tube was tied to the guidewire, pulled        through the mouth into the stomach and then pulled out from the stomach through the skin. The        bumper was attached to the gastrostomy tube. The feeding tube was then cut to an appropriate        length. The final position of the gastrostomy tube was confirmed by relook endoscopy, and        skin marking noted to be 3 cm at the external bumper. The final tension and compression of        the abdominal wall by the PEG tube and external bumper were checked and revealed that the        bumper was moderately tight and mildly deforming the skin. The J tube extension (12 F        EndoVive Jejunal feeding tube) was advanced into the stomach. The tip of the J tube had a        loop thread that was captured with a Resolution clip. The thread and the J tube extension        were advanced to the proximal jejunum, and the endoclip along with the tip of the J tube was        attached to the wall securely. The J tube extension was capped, and the tube site was cleaned        and dressed.       A small fistula was found on the anterior wall of the gastric body related to prior G tube        site. Coagulation of tissue near the fistula using argon plasma at 1.2 liters/minute and 35        peraza was successful. To closure the gastrocutaneous fistula, four hemostatic clips were        successfully placed (MR conditional, two 16 mm DuraClip and 2 Mantis clips.       The examined duodenum was normal.    10/14, 10/15 Fever, transient hypoxia post procedure  10/15 ProCalcitonin = 8.48 suggestive of bacterial process; BCx x2 NGTD  10;16 Leukocytosis WBC = 14.26  10/17 fever, hypotension, abd distension, no diarrhea noted this am WBC = 15.02; Rising Procalcitonin = 38.49    Suggest  Continue Meropenem for recurrent fever/toxicity  add IV Vanco 750 every 12 hours   abd ct scan      discussed with ACP  spoke with  at bedside  called daughter - message left

## 2024-10-17 NOTE — PROGRESS NOTE ADULT - SUBJECTIVE AND OBJECTIVE BOX
Follow Up:      Interval History/ROS:    Allergies  walnut (Unknown)  metronidazole (Rash)  Lyrica (Unknown)  penicillin (Unknown)  Pineapple (Unknown)  Tagamet (Unknown)  heparin (Unknown)  Pecans (Unknown)  Hazelnut (Unknown)  meropenem (Rash)        ANTIMICROBIALS:  meropenem  IVPB 1000 every 8 hours  vancomycin  IVPB 750 every 12 hours      OTHER MEDS:  MEDICATIONS  (STANDING):  albuterol/ipratropium for Nebulization 3 every 6 hours  calcium carbonate    500 mG (Tums) Chewable 1 two times a day PRN  dextrose 50% Injectable 12.5 once  dextrose 50% Injectable 25 once  diphenhydrAMINE Elixir 12.5 every 6 hours PRN  diphenhydrAMINE Injectable 12.5 <User Schedule>  escitalopram 10 <User Schedule>  fentaNYL   Patch  12 MICROgram(s)/Hr 1 every 72 hours  fentaNYL   Patch  25 MICROgram(s)/Hr 1 every 72 hours  gabapentin Solution 250 <User Schedule>  glucagon  Injectable 1 once  influenza  Vaccine (HIGH DOSE) 0.5 once  insulin glargine Injectable (LANTUS) 20 at bedtime  insulin lispro (ADMELOG) corrective regimen sliding scale  <User Schedule>  insulin lispro (ADMELOG) corrective regimen sliding scale  <User Schedule>  insulin lispro Injectable (ADMELOG) 30 <User Schedule>  levothyroxine 125 daily  melatonin Liquid 12 <User Schedule>  midodrine 10 every 8 hours  oxyCODONE    IR 2.5 every 6 hours PRN  pantoprazole  Injectable 40 every 12 hours  predniSONE   Tablet 5 <User Schedule>  QUEtiapine 12.5 <User Schedule>  simethicone 160 <User Schedule>      Vital Signs Last 24 Hrs  T(C): 37.1 (17 Oct 2024 13:14), Max: 39.4 (17 Oct 2024 08:17)  T(F): 98.8 (17 Oct 2024 13:14), Max: 103 (17 Oct 2024 08:17)  HR: 103 (17 Oct 2024 14:57) (99 - 125)  BP: 126/57 (17 Oct 2024 13:14) (87/44 - 162/75)  BP(mean): --  RR: 16 (17 Oct 2024 08:17) (16 - 18)  SpO2: 99% (17 Oct 2024 14:57) (98% - 100%)    Parameters below as of 17 Oct 2024 08:17  Patient On (Oxygen Delivery Method): ventilator    O2 Concentration (%): 40    PHYSICAL EXAM:  General: ill appearing  Neurology: lethargic  Respiratory: fine crackle  CV: RRR, S1S2, no murmurs, rubs or gallops  Abdominal: distended,decreasec bowel sounds  Extremities: No edema,  Line Sites: Clear  Skin: No rash  - sacral wound superficial, healing in, appears clean                          9.5    15.02 )-----------( 124      ( 17 Oct 2024 07:20 )             34.3       10-17    139  |  107  |  15  ----------------------------<  160[H]  5.1   |  20[L]  |  <0.30[L]    Ca    8.4      17 Oct 2024 07:15  Phos  2.7     10-17  Mg     1.9     10-17        Urinalysis Basic - ( 17 Oct 2024 07:15 )    Color: x / Appearance: x / SG: x / pH: x  Gluc: 160 mg/dL / Ketone: x  / Bili: x / Urobili: x   Blood: x / Protein: x / Nitrite: x   Leuk Esterase: x / RBC: x / WBC x   Sq Epi: x / Non Sq Epi: x / Bacteria: x        MICROBIOLOGY:  .Blood BLOOD  10-15-24   No growth at 24 hours  --  --      Trach Asp Tracheal Aspirate  10-15-24   Numerous Pseudomonas aeruginosa  Commensal vinay consistent with body site  --    No polymorphonuclear leukocytes per low power field  No Squamous epithelial cells per low power field  Numerous Gram Negative Rods per oil power field  Few Gram Positive Rods per oil power field      .Blood BLOOD  10-15-24   No growth at 48 Hours  --  --      Trach Asp Tracheal Aspirate  10-08-24   Few Pseudomonas aeruginosa  Commensal vinay consistent with body site  --  Pseudomonas aeruginosa      Clean Catch Clean Catch (Midstream)  10-07-24   10,000 - 49,000 CFU/mL Escherichia coli ESBL  --  Escherichia coli ESBL      .Blood BLOOD  10-07-24   No growth at 5 days  --  --      .Blood BLOOD  10-07-24   No growth at 5 days  --  --      Clean Catch Clean Catch (Midstream)  10-03-24   Culture grew 3 or more types of organisms which indicate  collection contamination; consider recollection only if clinically  indicated.  --  --    RADIOLOGY:  < from: Xray Chest 1 View- PORTABLE-Urgent (Xray Chest 1 View- PORTABLE-Urgent .) (10.15.24 @ 15:47) >  Interval decrease in bilateral patchy opacities with improved aeration of   the bilateral lung fields. Trace bilateral pleural effusions and/or   bibasilar atelectasis. Low lung volumes.    < end of copied text >      Zion Newman MD; Division of Infectious Disease; Pager: [phone]; nights and weekends: [phone]

## 2024-10-17 NOTE — PROGRESS NOTE ADULT - ASSESSMENT
75 yo F PMH T2DM on insulin, HTN, HLD, hypothyroidism, RA on Prednisone, Fibromyalgia, cerebral aneurysm s/p repair, chronic hypercapnic respiratory failure s/p trach (vent dependent) and chronic PEG 2022, recurrent C. diff infection (follows with Dr. 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  Pt also noted to have rectal bleeding this past week requiring transfusion 5 days ago in ED as per daughter. Daughter also reports brownish discharge from G-J tube. In ED, pt afebrile, fiO2 96-98% on 40% on ventilator.  Chest X-ray w/ opacification of right lung concerning for possible PNA.  Admitted to [facility]CU for further management. Sputum cxs grew PSA; Treated with course of Cefepime. Patient with decreased H+H received 1 unit of PRBCS on 10/10.        10/16: Received PEG-J exchange by GI via EGD 10/14. 10/15; Fever of 101.8, PAN cultured, ^ procalcitonin, possible source translocation during PEJ change. Placed on Meropenem with benadryl for rash.  Continue enteral feed regimen which coordinates with insulin dosing. 73 yo F PMH T2DM on insulin, HTN, HLD, hypothyroidism, RA on Prednisone, Fibromyalgia, cerebral aneurysm s/p repair, chronic hypercapnic respiratory failure s/p trach (vent dependent) and chronic PEG 2022, recurrent C. diff infection (follows with Dr. 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  Pt also noted to have rectal bleeding this past week requiring transfusion 5 days ago in ED as per daughter. Daughter also reports brownish discharge from G-J tube. In ED, pt afebrile, fiO2 96-98% on 40% on ventilator.  Chest X-ray w/ opacification of right lung concerning for possible PNA.  Admitted to RCU for further management. Sputum cxs grew PSA; Treated with course of Cefepime. Patient with decreased H+H received 1 unit of PRBCS on 10/10.  Continue airway management with duonebs, chest PT and suction PRN.        10/17:  Febrile this am to 103 orally.  Blood cultures x2 sent.  C. diff treatment started.  Hypotensive, given IVF bolus.  Pending CT C/A/P to r/o infectious source.  Clogged PEGJ, interventions attempted which did not work, Dr. Rosales aware.

## 2024-10-18 NOTE — PROGRESS NOTE ADULT - PROBLEM SELECTOR PLAN 7
- Continue Synthroid - Continue Standing/ Breakthrough Tylenol   - Continue Gabapentin / PRN Oxy   - Continue Fentanyl patches

## 2024-10-18 NOTE — PROGRESS NOTE ADULT - SUBJECTIVE AND OBJECTIVE BOX
Patient is a 74y old  Female who presents with a chief complaint of Patient admitted with Hypoxemia and episode of Bright red blood per rectum (17 Oct 2024 22:26)      Interval Events:    REVIEW OF SYSTEMS:  [ ] Positive  [ ] All other systems negative  [ ] Unable to assess ROS because ________    Vital Signs Last 24 Hrs  T(C): 36.8 (10-18-24 @ 04:54), Max: 39.4 (10-17-24 @ 08:17)  T(F): 98.2 (10-18-24 @ 04:54), Max: 103 (10-17-24 @ 08:17)  HR: 89 (10-18-24 @ 05:28) (77 - 125)  BP: 104/59 (10-18-24 @ 04:54) (87/44 - 186/96)  RR: 18 (10-18-24 @ 04:54) (16 - 18)  SpO2: 100% (10-18-24 @ 05:28) (98% - 100%)    PHYSICAL EXAM:  HEENT:   [ ]Tracheostomy:  [ ]Pupils equal  [ ]No oral lesions  [ ]Abnormal    SKIN  [ ]No Rash  [ ] Abnormal  [ ] pressure    CARDIAC  [ ]Regular  [ ]Abnormal    PULMONARY  [ ]Bilateral Clear Breath Sounds  [ ]Normal Excursion  [ ]Abnormal    GI  [ ]PEG      [ ] +BS		              [ ]Soft, nondistended, nontender	  [ ]Abnormal    MUSCULOSKELETAL                                   [ ]Bedbound                 [ ]Abnormal    [ ]Ambulatory/OOB to chair                           EXTREMITIES                                         [ ]Normal  [ ]Edema                           NEUROLOGIC  [ ] Normal, non focal  [ ] Focal findings:    PSYCHIATRIC  [ ]Alert and appropriate  [ ] Sedated	 [ ]Agitated    :  Mcbride: [ ] Yes, if yes: Date of Placement:                   [  ] No    LINES: Central Lines [ ] Yes, if yes: Date of Placement                                     [  ] No    HOSPITAL MEDICATIONS:  MEDICATIONS  (STANDING):  albuterol/ipratropium for Nebulization 3 milliLiter(s) Nebulizer every 6 hours  artificial  tears Solution 1 Drop(s) Both EYES every 6 hours  ascorbic acid 500 milliGRAM(s) Oral daily  Biotene Dry Mouth Oral Rinse 5 milliLiter(s) Swish and Spit every 6 hours  calcium carbonate 1250 mG  + Vitamin D (OsCal 500 + D) 1 Tablet(s) Oral <User Schedule>  chlorhexidine 0.12% Liquid 15 milliLiter(s) Oral Mucosa every 12 hours  chlorhexidine 4% Liquid 1 Application(s) Topical daily  dextrose 5% + lactated ringers. 1000 milliLiter(s) (50 mL/Hr) IV Continuous <Continuous>  dextrose 5%. 1000 milliLiter(s) (100 mL/Hr) IV Continuous <Continuous>  dextrose 5%. 1000 milliLiter(s) (50 mL/Hr) IV Continuous <Continuous>  dextrose 50% Injectable 25 Gram(s) IV Push once  dextrose 50% Injectable 12.5 Gram(s) IV Push once  diclofenac sodium 1% Gel 2 Gram(s) Topical every 6 hours  diphenhydrAMINE Injectable 12.5 milliGRAM(s) IV Push <User Schedule>  escitalopram 10 milliGRAM(s) Oral <User Schedule>  fentaNYL   Patch  12 MICROgram(s)/Hr 1 Patch Transdermal every 72 hours  fentaNYL   Patch  25 MICROgram(s)/Hr 1 Patch Transdermal every 72 hours  gabapentin Solution 250 milliGRAM(s) Oral <User Schedule>  glucagon  Injectable 1 milliGRAM(s) IntraMuscular once  hemorrhoidal Ointment 1 Application(s) Rectal at bedtime  influenza  Vaccine (HIGH DOSE) 0.5 milliLiter(s) IntraMuscular once  insulin glargine Injectable (LANTUS) 20 Unit(s) SubCutaneous <User Schedule>  insulin lispro (ADMELOG) corrective regimen sliding scale   SubCutaneous <User Schedule>  insulin lispro (ADMELOG) corrective regimen sliding scale   SubCutaneous <User Schedule>  insulin lispro Injectable (ADMELOG) 30 Unit(s) SubCutaneous <User Schedule>  lactobacillus acidophilus 1 Tablet(s) Oral <User Schedule>  levothyroxine 125 MICROGram(s) Oral daily  lidocaine   4% Patch 1 Patch Transdermal every 24 hours  melatonin Liquid 12 milliGRAM(s) Oral <User Schedule>  meropenem  IVPB 1000 milliGRAM(s) IV Intermittent every 8 hours  midodrine 10 milliGRAM(s) Oral every 8 hours  multivitamin/minerals/iron Oral Solution (CENTRUM) 15 milliLiter(s) Enteral Tube daily  pantoprazole  Injectable 40 milliGRAM(s) IV Push every 12 hours  prednisoLONE acetate 1% Suspension 1 Drop(s) Both EYES every 6 hours  predniSONE   Tablet 5 milliGRAM(s) Oral <User Schedule>  QUEtiapine 12.5 milliGRAM(s) Oral <User Schedule>  simethicone 160 milliGRAM(s) Chew <User Schedule>  sodium chloride 1 Gram(s) Oral every 12 hours  sodium chloride 0.65% Nasal 1 Spray(s) Both Nostrils every 6 hours  vancomycin  IVPB 750 milliGRAM(s) IV Intermittent every 12 hours  witch hazel Pads 1 Application(s) Topical every 12 hours    MEDICATIONS  (PRN):  calcium carbonate    500 mG (Tums) Chewable 1 Tablet(s) Chew two times a day PRN Gas  diphenhydrAMINE Elixir 12.5 milliGRAM(s) Oral every 6 hours PRN Rash and/or Itching  oxyCODONE    IR 2.5 milliGRAM(s) Oral every 6 hours PRN Severe Pain (7 - 10)      LABS:                        9.5    15.02 )-----------( 124      ( 17 Oct 2024 07:20 )             34.3     10-17    139  |  107  |  15  ----------------------------<  160[H]  5.1   |  20[L]  |  <0.30[L]    Ca    8.4      17 Oct 2024 07:15  Phos  2.7     10-17  Mg     1.9     10-17        Urinalysis Basic - ( 17 Oct 2024 07:15 )    Color: x / Appearance: x / SG: x / pH: x  Gluc: 160 mg/dL / Ketone: x  / Bili: x / Urobili: x   Blood: x / Protein: x / Nitrite: x   Leuk Esterase: x / RBC: x / WBC x   Sq Epi: x / Non Sq Epi: x / Bacteria: x          CAPILLARY BLOOD GLUCOSE    MICROBIOLOGY:     RADIOLOGY:  [ ] Reviewed and interpreted by me    Mode: AC/ CMV (Assist Control/ Continuous Mandatory Ventilation)  RR (machine): 18  TV (machine): 350  FiO2: 40  PEEP: 5  ITime: 1  MAP: 11  PIP: 33   Patient is a 74y old  Female who presents with a chief complaint of Patient admitted with Hypoxemia and episode of Bright red blood per rectum (17 Oct 2024 22:26)    Interval Events:  No acute events overnight.    REVIEW OF SYSTEMS:  [ ] Positive  [X] All other systems negative  [ ] Unable to assess ROS because ________    Vital Signs Last 24 Hrs  T(C): 36.8 (10-18-24 @ 04:54), Max: 39.4 (10-17-24 @ 08:17)  T(F): 98.2 (10-18-24 @ 04:54), Max: 103 (10-17-24 @ 08:17)  HR: 89 (10-18-24 @ 05:28) (77 - 125)  BP: 104/59 (10-18-24 @ 04:54) (87/44 - 186/96)  RR: 18 (10-18-24 @ 04:54) (16 - 18)  SpO2: 100% (10-18-24 @ 05:28) (98% - 100%)    PHYSICAL EXAM:  HEENT:   [X]Tracheostomy: #8 XLT shiley   [X]Pupils equal  [ ]No oral lesions  [ ]Abnormal    SKIN  [ ]No Rash  [X] Abnormal - IAD, MAD at old PEG site  [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    CARDIAC  [X]Regular  [X]Abnormal - intermittent tachycardia    PULMONARY  [ ]Bilateral Clear Breath Sounds  [ ]Normal Excursion  [X]Abnormal - BL coarse BS    GI  [XPEG      [X] +BS		              [X] soft, nondistended, nontender	  [X]Abnormal - old PEG site dressing c/d/i    MUSCULOSKELETAL                                   [X]Bedbound                 [ ]Abnormal    [ ]Ambulatory/OOB to chair                           EXTREMITIES                                         [ ]Normal  [X]Edema - mild generalized edema                  NEUROLOGIC  [ ] Normal, non focal  [X] Focal findings: lethargic but easily arousable, follows commands     PSYCHIATRIC  [X] lethargic but appropriate  [ ] Sedated	 [ ]Agitated    :  Mcbride: [ ] Yes, if yes: Date of Placement:                   [X] No    LINES: Central Lines [ ] Yes, if yes: Date of Placement                                     [X] No    HOSPITAL MEDICATIONS:  MEDICATIONS  (STANDING):  albuterol/ipratropium for Nebulization 3 milliLiter(s) Nebulizer every 6 hours  artificial  tears Solution 1 Drop(s) Both EYES every 6 hours  ascorbic acid 500 milliGRAM(s) Oral daily  Biotene Dry Mouth Oral Rinse 5 milliLiter(s) Swish and Spit every 6 hours  calcium carbonate 1250 mG  + Vitamin D (OsCal 500 + D) 1 Tablet(s) Oral <User Schedule>  chlorhexidine 0.12% Liquid 15 milliLiter(s) Oral Mucosa every 12 hours  chlorhexidine 4% Liquid 1 Application(s) Topical daily  dextrose 5% + lactated ringers. 1000 milliLiter(s) (50 mL/Hr) IV Continuous <Continuous>  dextrose 5%. 1000 milliLiter(s) (100 mL/Hr) IV Continuous <Continuous>  dextrose 5%. 1000 milliLiter(s) (50 mL/Hr) IV Continuous <Continuous>  dextrose 50% Injectable 25 Gram(s) IV Push once  dextrose 50% Injectable 12.5 Gram(s) IV Push once  diclofenac sodium 1% Gel 2 Gram(s) Topical every 6 hours  diphenhydrAMINE Injectable 12.5 milliGRAM(s) IV Push <User Schedule>  escitalopram 10 milliGRAM(s) Oral <User Schedule>  fentaNYL   Patch  12 MICROgram(s)/Hr 1 Patch Transdermal every 72 hours  fentaNYL   Patch  25 MICROgram(s)/Hr 1 Patch Transdermal every 72 hours  gabapentin Solution 250 milliGRAM(s) Oral <User Schedule>  glucagon  Injectable 1 milliGRAM(s) IntraMuscular once  hemorrhoidal Ointment 1 Application(s) Rectal at bedtime  influenza  Vaccine (HIGH DOSE) 0.5 milliLiter(s) IntraMuscular once  insulin glargine Injectable (LANTUS) 20 Unit(s) SubCutaneous <User Schedule>  insulin lispro (ADMELOG) corrective regimen sliding scale   SubCutaneous <User Schedule>  insulin lispro (ADMELOG) corrective regimen sliding scale   SubCutaneous <User Schedule>  insulin lispro Injectable (ADMELOG) 30 Unit(s) SubCutaneous <User Schedule>  lactobacillus acidophilus 1 Tablet(s) Oral <User Schedule>  levothyroxine 125 MICROGram(s) Oral daily  lidocaine   4% Patch 1 Patch Transdermal every 24 hours  melatonin Liquid 12 milliGRAM(s) Oral <User Schedule>  meropenem  IVPB 1000 milliGRAM(s) IV Intermittent every 8 hours  midodrine 10 milliGRAM(s) Oral every 8 hours  multivitamin/minerals/iron Oral Solution (CENTRUM) 15 milliLiter(s) Enteral Tube daily  pantoprazole  Injectable 40 milliGRAM(s) IV Push every 12 hours  prednisoLONE acetate 1% Suspension 1 Drop(s) Both EYES every 6 hours  predniSONE   Tablet 5 milliGRAM(s) Oral <User Schedule>  QUEtiapine 12.5 milliGRAM(s) Oral <User Schedule>  simethicone 160 milliGRAM(s) Chew <User Schedule>  sodium chloride 1 Gram(s) Oral every 12 hours  sodium chloride 0.65% Nasal 1 Spray(s) Both Nostrils every 6 hours  vancomycin  IVPB 750 milliGRAM(s) IV Intermittent every 12 hours  witch hazel Pads 1 Application(s) Topical every 12 hours    MEDICATIONS  (PRN):  calcium carbonate    500 mG (Tums) Chewable 1 Tablet(s) Chew two times a day PRN Gas  diphenhydrAMINE Elixir 12.5 milliGRAM(s) Oral every 6 hours PRN Rash and/or Itching  oxyCODONE    IR 2.5 milliGRAM(s) Oral every 6 hours PRN Severe Pain (7 - 10)    LABS:                        9.5    15.02 )-----------( 124      ( 17 Oct 2024 07:20 )             34.3     10-17    139  |  107  |  15  ----------------------------<  160[H]  5.1   |  20[L]  |  <0.30[L]    Ca    8.4      17 Oct 2024 07:15  Phos  2.7     10-17  Mg     1.9     10-17    Urinalysis Basic - ( 17 Oct 2024 07:15 )    Color: x / Appearance: x / SG: x / pH: x  Gluc: 160 mg/dL / Ketone: x  / Bili: x / Urobili: x   Blood: x / Protein: x / Nitrite: x   Leuk Esterase: x / RBC: x / WBC x   Sq Epi: x / Non Sq Epi: x / Bacteria: x    CAPILLARY BLOOD GLUCOSE    MICROBIOLOGY:     RADIOLOGY:  [ ] Reviewed and interpreted by me    Mode: AC/ CMV (Assist Control/ Continuous Mandatory Ventilation)  RR (machine): 18  TV (machine): 350  FiO2: 40  PEEP: 5  ITime: 1  MAP: 11  PIP: 33

## 2024-10-18 NOTE — PROGRESS NOTE ADULT - ASSESSMENT
75 yo F PMH T2DM on insulin, HTN, HLD, hypothyroidism, RA on Prednisone, Fibromyalgia, cerebral aneurysm s/p repair, chronic hypercapnic respiratory failure s/p trach (vent dependent) and chronic PEG 2022, recurrent C. diff infection (follows with Dr. 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 s/p anabiotics and s/p GJ tube exchanges on 10/14 Endocrine consulted for Type 2 DM management while on tube feeding. Off TFs due to clogged JT. Will remain NPO for now. Pt on D5 infusion with BG at goal while on basal and correction insulin. BG Goal 100-180mg/dl. On chronic prednisone 5 mg daily.     - Home DM medications: Lantus 35 units at HS, Humalog 26 units with # 1 feeding, 22 units with #2 tube feeding, 20 units with #3 tube feeding and  Humalog correction scale (  2 units for -656, 4 units for -250, 6 units for -300, 8units for -350, 10 units for -400, 12 units for -450)   while on [employer] formula 3 cans/day, slow bolus( run at 80 ml/hr total volume 960 ml/day> 1st can around 6:30-8:30, 2nd can around 3 pm, 3rd can around 8-9 pm) plus Banatrol 1/2 packet BID, was increasing to 1 packet BID, plus Kefir 10 oz/day ( divided in multiple times throughout the day).

## 2024-10-18 NOTE — PROGRESS NOTE ADULT - PROBLEM SELECTOR PLAN 6
- Continue Standing/ Breakthrough Tylenol   - Continue Gabapentin / PRN Oxy   - Continue Fentanyl patches - Continue home prednisone 5 mg daily   - Pt receives Enbrel injections q Thursday (will hold in setting of infection)

## 2024-10-18 NOTE — PROGRESS NOTE ADULT - PROBLEM SELECTOR PLAN 10
- Patient with Sacral wound prior to admission  - Continue local wound care  - frequent turning and repositioning - Na 130+ on admission; improved to 134 today  - As per daughter pt usually receives 70 cc/hr of free water flushes per hr   - Patient on sodium chloride 1 gm daily at home   - Continue sodium chloride   - Monitor BMP

## 2024-10-18 NOTE — CHART NOTE - NSCHARTNOTEFT_GEN_A_CORE
Per RN not safe to turn patient, Will return on Monday 10/21. Fistula dressing changed.    Gabby Jay [facility]  [phone]

## 2024-10-18 NOTE — PROGRESS NOTE ADULT - PROBLEM SELECTOR PLAN 5
- Continue home prednisone 5 mg daily   - Pt receives Enbrel injections q Thursday ( Will hold in setting of infection) - Continue home prednisone 5 mg daily   - Pt receives Enbrel injections q Thursday (will hold in setting of infection) - Patient on Lantus and Humalog at home    - Continue Lantus/ISS/Humulin R   - Endocrine following

## 2024-10-18 NOTE — PROGRESS NOTE ADULT - ASSESSMENT
In summary, 75 yo female with numerous medical issues with vent dept related to respiratory failure, PEG-J extensions, DM II, HTN, HLD, hypothyroidism, RA on Prednisone, Fibromyalgia, cerebral aneurysm s/p repair, recurrent C. diff infection, with admission of hypoxia, rectal bleeding now s/p replacement of PEG-J and repeat closure of GC fistula who is now febrile with leukocytosis.  CT scan performed.  On my review, no obvious abdominal source of fever.  Lungs appear worse.  Await final read.    PLAN:    Hold off on J tube use since obstructed.  Will require replacement, likely Monday or Tuesday at this point.    Can use the G port for feeds only for now.  There is a possibility that the gastrocutaneous fistula may open further.  However, if she is able to tolerate G tube feed then may not require a J tube extension.  Await final CT report as source of fever.       Anil Rosales MD

## 2024-10-18 NOTE — PROGRESS NOTE ADULT - PROBLEM SELECTOR PLAN 8
- Patient has known hx of prior G-Tube stoma leak   - S/p EGD for closure of Gastric Fistula (8/12) w/ Total of 3 Mantis clips and two 22 mm Microtech clips by GI Dr Rosales   - Old stoma peg site with mild clear drainage s/p repair of fistula  - As per daughter feeds and meds all administered via j-port and g-tube remains to continuously vented for chronic gaseous distention   - 10/14:  G-J exchanged by GI.  Feeds resumed. - Continue Synthroid

## 2024-10-18 NOTE — PROGRESS NOTE ADULT - PROBLEM SELECTOR PLAN 9
- Na 130+ on admission; improved to 134 today  - As per daughter pt usually receives 70 cc/hr of free water flushes per hr   - Will decrease free water flushes to 20 cc q hr to prevent j-tube from clogging  - Patient on sodium chloride 1 gm daily at home   - Continue increased dose of 1 gram q 12 hrs   - Monitor  BMP Patient has known hx of prior G-Tube stoma leak   - S/p EGD for closure of Gastric Fistula (8/12) w/ Total of 3 Mantis clips and two 22 mm Microtech clips by GI Dr Rosales   - Old stoma peg site with mild clear drainage s/p repair of fistula  - As per daughter feeds and meds all administered via j-port and g-tube remains to continuously vented for chronic gaseous distention   - 10/14 G-J exchanged by GI and clips applied to fistula site  - 10/17 PEGJ clogged  - 10/18 ok to use G port for feeds as per GI Dr. Rosales

## 2024-10-18 NOTE — PROGRESS NOTE ADULT - PROBLEM SELECTOR PLAN 11
- Dc planning when medically stable - Patient with Sacral wound prior to admission  - Continue local wound care  - frequent turning and repositioning

## 2024-10-18 NOTE — PROGRESS NOTE ADULT - PROBLEM SELECTOR PLAN 2
Thyroid Function Tests:  10-16 @ 06:50 TSH 1.74 FreeT4 1.2 T3 -- Anti TPO -- Anti Thyroglobulin Ab -- TSI --  C/w LT4 125mcg day  Please make sure LT4 is given on an empty stomach at least one hour apart from other meds and food to ensure med absorption AND 4 HOURS APART FROM CA/FE/MVI/PPI!!   Follow up levels as out p

## 2024-10-18 NOTE — PROGRESS NOTE ADULT - ASSESSMENT
75 yo F PMH T2DM on insulin, HTN, HLD, hypothyroidism, RA on Prednisone, Fibromyalgia, cerebral aneurysm s/p repair, chronic hypercapnic respiratory failure s/p trach (vent dependent) and chronic PEG 2022, recurrent C. diff infection , persistent GJ tube leaks now s/p GC fistula repair 8/12  admitted 10/7/24 with resp distress and found to have RML opacity     Antibiotics  Ceftriaxone 10/7  Azithro 10/7  Cefepime 10/7--> 10/12  meropenem 10/15 -->   IV Vanco 10/17 -->      10/10 melena, anemia, blood tx  10/14 EGD for GJ exchange and piror PEG site closure  One benign-appearing, intrinsic moderate stenosis was found in the upper third of the        esophagus. The stenosis was traversed.       The cardia and gastric fundus were normal.       A gastric tube with tip in the jejunum was found in the gastric body. The PEG required        removal because it was leaking. The J tube extension (12F) was removed first. An externally        removable 24 Fr EndoVive Safety gastrostomy tube was lubricated. The guide wire was passed        through the existing G-tube port and snared endoscopically. The endoscope and snare were then        removed, pulling the wire out through the mouth. The g-tube was tied to the guidewire, pulled        through the mouth into the stomach and then pulled out from the stomach through the skin. The        bumper was attached to the gastrostomy tube. The feeding tube was then cut to an appropriate        length. The final position of the gastrostomy tube was confirmed by relook endoscopy, and        skin marking noted to be 3 cm at the external bumper. The final tension and compression of        the abdominal wall by the PEG tube and external bumper were checked and revealed that the        bumper was moderately tight and mildly deforming the skin. The J tube extension (12 F        EndoVive Jejunal feeding tube) was advanced into the stomach. The tip of the J tube had a        loop thread that was captured with a Resolution clip. The thread and the J tube extension        were advanced to the proximal jejunum, and the endoclip along with the tip of the J tube was        attached to the wall securely. The J tube extension was capped, and the tube site was cleaned        and dressed.       A small fistula was found on the anterior wall of the gastric body related to prior G tube        site. Coagulation of tissue near the fistula using argon plasma at 1.2 liters/minute and 35        peraza was successful. To closure the gastrocutaneous fistula, four hemostatic clips were        successfully placed (MR conditional, two 16 mm DuraClip and 2 Mantis clips.       The examined duodenum was normal.    10/14, 10/15 Fever, transient hypoxia post procedure  10/15 ProCalcitonin = 8.48 suggestive of bacterial process; BCx x2 NGTD; Trach: Pseudomonas   - though benign mg stain  10;16 Leukocytosis WBC = 14.26  10/17 fever, hypotension, abd distension, no diarrhea noted this am WBC = 15.02; Rising Procalcitonin = 38.49; Obstructed J tube   10/18  lost IV access re-gained  - CT scan late this afternoon  WBC = 8.68; Rising Procalcitonin >100    I am concerned about intraabdominal process    Suggest  Continue Meropenem for recurrent fever/toxicity  Continue IV Vanco 750 every 12 hours  await  ct scan      discussed with ACP  spoke with  at bedside  spoke with daughter by phone    ID will see this weekend

## 2024-10-18 NOTE — PROGRESS NOTE ADULT - PROBLEM SELECTOR PLAN 3
- Amolodipine dcd 10/10 in setting of borderline bp   - Monitor BP - Amolodipine d/c'd 10/10 in setting of borderline bp   - required IVF boluses for hypotension  - Monitor BP Patient with Hx of external Hemmorrhoids  - Bleeding Hemmorrhoids noted on admission exam   - G-J Tube flushed and lavaged no evidence of active bleeding   - CT ABD / Pelvis 10/7: Unchanged gastrocutaneous fistula. Gastrojejunostomy tube is intact.  No focal intra-abdominal/pelvic or subcutaneous collections.  - Occult 10/7: Positive  - Transfused on 10/10 with appropriate response in H+H  - Monitor CBC / + Active Type and screen

## 2024-10-18 NOTE — PROGRESS NOTE ADULT - NS ATTEND AMEND GEN_ALL_CORE FT
Pt is a 74F with MHx IDDM2, HTN, HLD, hypothyroidism, RA on Prednisone, fibromyalgia, cerebral aneurysm s/p repair, chronic hypoxemia and hypercapnic respiratory failure, tracheostomy and vent dependent, recurrent C. diff infection, chronic PEG 2022 with persistent GJ tube leaks now s/p GC fistula repair 8/12, recent presentation 5 days PTA for rectal bleeding requiring transfusion in the ED who now presents with acute hypoxemic respiratory distress likely 2/2 RML PNA admitted to the RCU for further management.     This AM patient with worsening fevers and hypotension. Labs note an increased procalcitonin and slowly rising WBC count. She is having more diarrhea and has an extensive c. diff history. Will broaden antibiotics, start midodrine, and bolus IVF. If patient's BP does not improve with resuscitation may require IV vasopressors.      #Neuro - awake, alert, and interactive by mouthing words  - Was evaluated for hearing loss but unable to cooperate with audiogram - follow-up as outpatient  - History of anxiety and tardive which per daughter was treated and improved with Seroquel and Lexapro but now family noting some of these symptoms (clicking noises and unusual movements again). We discussed medication titration but this may also impact respiratory status  - Continue pain medications as able based on BP  #Cardiovascular - hypotensive around mid day today in setting of high fevers and likely worsening infection  - IVF resuscitation and start Midodrine  #Pulmonary - chronic hypoxemic and hypercapnic respiratory failure on mechanical ventilation  - Has been vent dependent but daughter note that in March 2023 she was able to come off the vent for some time at [facility]. We discussed that this may not be possible but we will continue PSV trials as able and if SAFE. Attempted PSV 15/5 during rounds again this AM but patient with low volumes (about 2-3L decrease in minute ventilation) and given her worsening infectious status will hold off further PSV today  - Has recently had some blood tinged secretions likely due to deep suctioning - will monitor output and minimize suctioning  - Sputum cultures with Pseudomonas in past - repeat sputum culture in lab. Xray reviewed with slight improvement in opacities  - Continue bronchodilators and airway clearance. Continue trach care.   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 Monitor for drainage  - Restarted feeds   - Patient noted to have anemia of unclear etiology and did receive PRBC during this admission with appropriate response. Has had prior melena which daughter felt was due to hemorrhoids. Follow-up GI  - Continue PPI  - Daughter has noted bloating and the G tube has been vented at times. Patient would benefit from having a venting system at home for G-tube as she does require G-tube venting to prevent abdominal distension  #Nephro - normal renal function  #Infectious Disease - completed course of antibiotics for bacterial pneumonia (RML) previously  - Patient with fever 103 this AM and subsequently with hypotension - will broaden antibiotics and check CT c/a/p if able to do this safely  - Given history will continue Meropenem for now - with understanding there is a risk given her history of c. diff  - ID follow-up - given increased diarrhea should we empirically treat for c. diff or repeat testing (?)  - Sacral wound > 2 years. Wound care follow-up  #Hem/Onc - monitor counts and transfuse as needed  - DVT proph with SCDs given recent bleeding and PRBC transfusion  #Endo - DM2 with hyperglycemia - continue insulin and adjust as needed for goal FS < 200  - Endocrine follow-up  - Will continue current feeds and make adjustments to timing if this can be done safely  #Rheum - RA with chronic pain - continue Prednisone and pain management.  - Would hold off Enbrel in setting of fevers      Long term prognosis guarded. Discussed with patient's  and aide at bedside today. I also spoke with patient's daughter, Marysol, over the phone this AM during rounds. We discussed patient's worsening fevers and planned workup related to this. Pt is a 74F with MHx IDDM2, HTN, HLD, hypothyroidism, RA on Prednisone, fibromyalgia, cerebral aneurysm s/p repair, chronic hypoxemia and hypercapnic respiratory failure, tracheostomy and vent dependent, recurrent C. diff infection, chronic PEG 2022 with persistent GJ tube leaks now s/p GC fistula repair 8/12, recent presentation 5 days PTA for rectal bleeding requiring transfusion in the ED who now presents with acute hypoxemic respiratory distress likely 2/2 RML PNA admitted to the RCU for further management.     Over last 48 hours patient has had relative hypotension requiring IVF resuscitation. She has had a rising procalcitonin which is now > 100. Blood cultures are thus far negative. Her clinical status is very tenuous. In addition her J-tube has clogged. She is pending a CT abdomen/pelvis which will be done this afternoon as more IV access had to be placed.      #Neuro - awake, alert, and interactive by mouthing words  - Was evaluated for hearing loss but unable to cooperate with audiogram - follow-up as outpatient  - History of anxiety and tardive which per daughter was treated and improved with Seroquel and Lexapro but now family noting some of these symptoms (clicking noises and unusual movements again). We discussed medication titration but this may also impact respiratory status  - Continue pain medications as able based on BP  #Cardiovascular - IVF resuscitation as needed and Midodrine if able to have enteral access  - Patient is edematous but will not diurese at this time given worsening clinical status  #Pulmonary - chronic hypoxemic and hypercapnic respiratory failure on mechanical ventilation  - Has been vent dependent but daughter note that in March 2023 she was able to come off the vent for some time at [facility]. We discussed that this may not be possible but we will continue PSV trials as able and if SAFE.   - Has recently had some blood tinged secretions likely due to deep suctioning - will monitor output and minimize suctioning  - Sputum cultures with Pseudomonas in past - repeat sputum culture in lab.  - Continue bronchodilators and airway clearance. Continue trach care.   - Maintain O2 sat > 90%  - Continue to require mechanical ventilation   #Gastro - oropharyngeal tube with GJ tube which was replaced by GI on 10/14. 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 Monitor for drainage  - Patient noted to have anemia of unclear etiology and did receive PRBC during this admission with appropriate response. Has had prior melena which daughter felt was due to hemorrhoids. Follow-up GI.  - Continue PPI  - Daughter has noted bloating and the G tube has been vented at times. Patient would benefit from having a venting system at home for G-tube as she does require G-tube venting to prevent abdominal distension  - Plan for further abdominal imaging with IV contrast given concern for intra-abdominal infection. Will also check CT chest in this setting.  #Nephro - normal renal function  #Infectious Disease - completed course of antibiotics for bacterial pneumonia (RML) previously  - Patient with fever up to 103 last 48 hours and subsequently with hypotension - will broaden antibiotics and check CT c/a/p if able to do this safely  - Continue Vanco and Marla  - ID follow-up noted - less diarrhea today though patient has been off feeds  - Sacral wound > 2 years. Wound care follow-up  #Hem/Onc - monitor counts and transfuse as needed  - DVT proph with SCDs given recent bleeding and PRBC transfusion  #Endo - DM2 with hyperglycemia - continue insulin and adjust as needed for goal FS < 200  - Endocrine follow-up  - Will continue current feeds and make adjustments to timing if this can be done safely  #Rheum - RA with chronic pain - continue Prednisone and pain management.  - Would hold off Enbrel in setting of fevers      Long term prognosis guarded. Discussed with patient's  and aide at bedside today. RCU team has been in contact with patient's daughter, Marysol, as well.

## 2024-10-18 NOTE — PROGRESS NOTE ADULT - SUBJECTIVE AND OBJECTIVE BOX
Follow Up:  fever    Interval History/ROS:  interactive on vent    Allergies  walnut (Unknown)  metronidazole (Rash)  Lyrica (Unknown)  penicillin (Unknown)  Pineapple (Unknown)  Tagamet (Unknown)  heparin (Unknown)  Pecans (Unknown)  Hazelnut (Unknown)  meropenem (Rash)        ANTIMICROBIALS:  meropenem  IVPB 1000 every 8 hours  vancomycin  IVPB 750 every 12 hours      OTHER MEDS:  MEDICATIONS  (STANDING):  albuterol/ipratropium for Nebulization 3 every 6 hours  calcium carbonate    500 mG (Tums) Chewable 1 two times a day PRN  dextrose 50% Injectable 25 once  dextrose 50% Injectable 12.5 once  diphenhydrAMINE Elixir 12.5 every 6 hours PRN  diphenhydrAMINE Injectable 12.5 <User Schedule>  escitalopram 10 <User Schedule>  fentaNYL   Patch  12 MICROgram(s)/Hr 1 every 72 hours  fentaNYL   Patch  25 MICROgram(s)/Hr 1 every 72 hours  gabapentin Solution 250 <User Schedule>  glucagon  Injectable 1 once  influenza  Vaccine (HIGH DOSE) 0.5 once  insulin glargine Injectable (LANTUS) 20 <User Schedule>  insulin lispro (ADMELOG) corrective regimen sliding scale  <User Schedule>  insulin lispro (ADMELOG) corrective regimen sliding scale  <User Schedule>  levothyroxine 125 daily  melatonin Liquid 12 <User Schedule>  midodrine 10 every 8 hours  oxyCODONE    IR 2.5 every 6 hours PRN  pantoprazole  Injectable 40 every 12 hours  predniSONE   Tablet 5 <User Schedule>  QUEtiapine 12.5 <User Schedule>  simethicone 160 <User Schedule>      Vital Signs Last 24 Hrs  T(C): 37.9 (18 Oct 2024 13:01), Max: 37.9 (18 Oct 2024 13:01)  T(F): 100.2 (18 Oct 2024 13:01), Max: 100.2 (18 Oct 2024 13:01)  HR: 116 (18 Oct 2024 12:00) (77 - 116)  BP: 139/61 (18 Oct 2024 12:00) (104/59 - 186/96)  BP(mean): --  RR: 18 (18 Oct 2024 12:00) (18 - 18)  SpO2: 99% (18 Oct 2024 12:00) (98% - 100%)    Parameters below as of 18 Oct 2024 05:28  Patient On (Oxygen Delivery Method): ventilator        PHYSICAL EXAM:  General: chronically ill appearing  Neurology: Alert and  interactive,   appears fatigued  Respiratory: fine crackles  CV: RRR, S1S2, no murmurs, rubs or gallops  Abdominal: distended,+bowel sounds  Extremities: trace  edema,  Line Sites: Clear  Skin: No rash                          7.5    8.68  )-----------( 134      ( 18 Oct 2024 06:42 )             27.0   WBC Count: 8.68 (10-18 @ 06:42)  WBC Count: 15.02 (10-17 @ 07:20)  WBC Count: 14.26 (10-16 @ 06:50)  WBC Count: 12.41 (10-15 @ 14:12)  WBC Count: 9.05 (10-14 @ 05:08)    10-18    141  |  109[H]  |  16  ----------------------------<  130[H]  4.0   |  23  |  <0.30[L]    Ca    8.0[L]      18 Oct 2024 06:42  Phos  3.2     10-18  Mg     1.8     10-18    Urinalysis + Microscopic Examination (10.15.24 @ 15:09)   pH Urine: 5.0  Urine Appearance: Clear  Color: Dark Yellow  Specific Gravity: 1.024  Protein, Urine: Negative mg/dL  Glucose Qualitative, Urine: Negative mg/dL  Ketone - Urine: Negative mg/dL  Blood, Urine: Negative  Bilirubin: Negative  Urobilinogen: 1.0 mg/dL  Leukocyte Esterase Concentration: Negative  Nitrite: Negative  Review: Reviewed  White Blood Cell - Urine: 2 /HPF  Red Blood Cell - Urine: 1 /HPF  Bacteria: Negative /HPF  Cast: 15 /LPF  Epithelial Cells: 1 /HPF    MICROBIOLOGY:  .Blood BLOOD  10-15-24   No growth at 48 Hours  --  --      Trach Asp Tracheal Aspirate  10-15-24   Numerous Pseudomonas aeruginosa Susceptibility to follow.  Commensal vinay consistent with body site  --    No polymorphonuclear leukocytes per low power field  No Squamous epithelial cells per low power field  Numerous Gram Negative Rods per oil power field  Few Gram Positive Rods per oil power field      .Blood BLOOD  10-15-24   No growth at 72 Hours  --  --      Trach Asp Tracheal Aspirate  10-08-24   Few Pseudomonas aeruginosa  Commensal vinay consistent with body site  --  Pseudomonas aeruginosa      Clean Catch Clean Catch (Midstream)  10-07-24   10,000 - 49,000 CFU/mL Escherichia coli ESBL  --  Escherichia coli ESBL      .Blood BLOOD  10-07-24   No growth at 5 days  --  --      .Blood BLOOD  10-07-24   No growth at 5 days  --  --      Clean Catch Clean Catch (Midstream)  10-03-24   Culture grew 3 or more types of organisms which indicate  collection contamination; consider recollection only if clinically  indicated.  --  --    .Blood BLOOD  Trach Asp Tracheal Aspirate      RADIOLOGY:  < from: Xray Chest 1 View- PORTABLE-Urgent (Xray Chest 1 View- PORTABLE-Urgent .) (10.15.24 @ 15:47) >  Interval decrease in bilateral patchy opacities with improved aeration of   the bilateral lung fields. Trace bilateral pleural effusions and/or   bibasilar atelectasis. Low lung volumes.    < end of copied text >      Zion Newman MD; Division of Infectious Disease; Pager: [phone]; nights and weekends: [phone]

## 2024-10-18 NOTE — PROGRESS NOTE ADULT - PROBLEM SELECTOR PLAN 5
LDL goal <70 due to DM  Pt LDL NA  Order fasting lipid if not recently done  Statin as per primary team. No getting atatin at this time   Manage per primary team   F/u levels as out pt

## 2024-10-18 NOTE — PROGRESS NOTE ADULT - PROBLEM SELECTOR PLAN 4
- Patient on Lantus and Humalog at home    - Continue Lantus /ISS / Humulin r   - Endocrine consult appreciated - Patient on Lantus and Humalog at home    - Continue Lantus/ISS/Humulin R   - Endocrine following - Amolodipine d/c'd 10/10 in setting of borderline bp   - required IVF boluses for hypotension  - Monitor BP

## 2024-10-18 NOTE — PROGRESS NOTE ADULT - NUTRITIONAL ASSESSMENT
Diet, NPO (10-17-24 @ 15:25) [Active]      Please see RD assessment and/or follow up.  Managed by primary team as well

## 2024-10-18 NOTE — ADVANCED PRACTICE NURSE CONSULT - REASON FOR CONSULT
Vascular Access Team    Evaluation for: Bedside Midline placement  Requested by name: Isaura Castro  Date/Time: 10/18 2 8:14    Indication: Access for antibiotics / Vanco x7 days  Allergy to CHG or Heparin or Lidocaine: heparin    Platelets(>20): 126  INR(<3): 1.19  eGFR(>40): 113  Blood cultures sent: 10/17 @ 11:58  Blood culture negative in 48hrs: pending  Anticoagulants: no  Arms DVT: no  Mastectomy: no  Fistula: no  PPM/Defib: no  IR or Nephrology or ID clearance needed: no    Consent obtained: NA    Plan: Bedside midline order evaluated.

## 2024-10-18 NOTE — PROGRESS NOTE ADULT - SUBJECTIVE AND OBJECTIVE BOX
DIABETES FOLLOW UP NOTE: Saw pt earlier today    Chief Complaint: Endocrine consult requested for management of DM    INTERVAL HX: Saw pt this am. TFs off due to occluded JT, also with fever and noted hypo/hypertension in the last 24 hours. Pt alert able to nod yes to abd pain. On antibiotics. BG levels at goal while off TFs and getting correction insulin plus basal insulin plus D5 infusion at 50cc/hr while NPO.      Review of Systems:  General: As above >able to nod  Cardiovascular: No chest pain, palpitations  Respiratory: No SOB, no cough  GI: No nausea, vomiting, + abdominal pain  Endocrine: No S&Sx of hypoglycemia    Allergies    walnut (Unknown)  metronidazole (Rash)  Lyrica (Unknown)  penicillin (Unknown)  Pineapple (Unknown)  Tagamet (Unknown)  heparin (Unknown)  Pecans (Unknown)  Hazelnut (Unknown)  meropenem (Rash)    Intolerances      MEDICATIONS:  insulin glargine Injectable (LANTUS) 20 Unit(s) SubCutaneous <User Schedule>  insulin lispro (ADMELOG) corrective regimen sliding scale   SubCutaneous <User Schedule>  insulin lispro (ADMELOG) corrective regimen sliding scale   SubCutaneous <User Schedule>  levothyroxine 125 MICROGram(s) Oral daily  meropenem  IVPB 1000 milliGRAM(s) IV Intermittent every 8 hours  predniSONE   Tablet 5 milliGRAM(s) Oral <User Schedule>  vancomycin  IVPB 750 milliGRAM(s) IV Intermittent every 12 hours      PHYSICAL EXAM:  VITALS: T(C): 37.9 (10-18-24 @ 13:01)  T(F): 100.2 (10-18-24 @ 13:01), Max: 100.2 (10-18-24 @ 13:01)  HR: 98 (10-18-24 @ 15:08) (77 - 116)  BP: 139/61 (10-18-24 @ 12:00) (104/59 - 186/96)  RR:  (18 - 18)  SpO2:  (98% - 100%)  Wt(kg): --  GENERAL: Female laying in bed in NAD. PVT HHA at bedside  Abdomen: Soft, nontender, non distended. Off TFs  Extremities: Warm, no edema in all 4 exts  NEURO: Alert and able to nod yes/no to simple questions    LABS:  POCT Blood Glucose.: 185 mg/dL (10-18-24 @ 11:44)  POCT Blood Glucose.: 173 mg/dL (10-18-24 @ 08:28)  POCT Blood Glucose.: 121 mg/dL (10-18-24 @ 02:00)  POCT Blood Glucose.: 188 mg/dL (10-17-24 @ 20:40)  POCT Blood Glucose.: 144 mg/dL (10-17-24 @ 17:47)  POCT Blood Glucose.: 236 mg/dL (10-17-24 @ 11:39)  POCT Blood Glucose.: 151 mg/dL (10-17-24 @ 07:45)  POCT Blood Glucose.: 186 mg/dL (10-17-24 @ 01:50)  POCT Blood Glucose.: 226 mg/dL (10-16-24 @ 21:32)  POCT Blood Glucose.: 250 mg/dL (10-16-24 @ 20:26)  POCT Blood Glucose.: 268 mg/dL (10-16-24 @ 15:57)  POCT Blood Glucose.: 215 mg/dL (10-16-24 @ 11:50)  POCT Blood Glucose.: 159 mg/dL (10-16-24 @ 08:52)  POCT Blood Glucose.: 293 mg/dL (10-16-24 @ 01:52)  POCT Blood Glucose.: 327 mg/dL (10-15-24 @ 22:19)  POCT Blood Glucose.: 355 mg/dL (10-15-24 @ 19:52)                            7.7    9.20  )-----------( 126      ( 18 Oct 2024 14:48 )             27.0       10-18    140  |  107  |  11  ----------------------------<  162[H]  3.8   |  22  |  <0.30[L]    eGFR: 113    Ca    7.8[L]      10-18  Mg     1.5     10-18  Phos  3.1     10-18      Thyroid Function Tests:  10-16 @ 06:50 TSH 1.74 FreeT4 1.2 T3 -- Anti TPO -- Anti Thyroglobulin Ab -- TSI --      A1C with Estimated Average Glucose Result: 5.8 % (10-14-24 @ 05:08)  A1C with Estimated Average Glucose Result: 6.4 % (08-12-24 @ 07:32)      Estimated Average Glucose: 120 mg/dL (10-14-24 @ 05:08)  Estimated Average Glucose: 137 mg/dL (08-12-24 @ 07:32)

## 2024-10-18 NOTE — PROGRESS NOTE ADULT - PROBLEM SELECTOR PLAN 1
- Patient with Chronic Trach at baseline   - Current Vent Settings: Volume AC :  RR: 18 PEEP: 5 FIO2: 40%  - CXR 10/7: Opacification of the right middle lobe may represent pneumonia versus atelectasis  - Received Rocephin and Azithro in the ED   - Sputum cx 10/8: PSA  - Completed course of Cefepime ended on 0/12  - ID continues to follow  - Fever post PEJ change, ^procalcitonin, leukocytosis  - Placed on Meropenem 10/15 patient with Chronic Trach at baseline   - mechanical vent: volume ctrl 18/350/5/40%  - CXR 10/7: Opacification of the right middle lobe may represent pneumonia versus atelectasis  - received Rocephin and Azithro in the ED   - sputum cx 10/8: PSA  - completed course of Cefepime ended on 0/12  - ID continues to follow patient with Chronic Trach at baseline   - mechanical vent: volume ctrl 18/350/5/40%  - CXR 10/7: Opacification of the right middle lobe may represent pneumonia versus atelectasis  - received Rocephin and Azithro in the ED   - sputum cx 10/8: PSA  - completed course of Cefepime ended on 10/12  - ID continues to follow

## 2024-10-18 NOTE — PROGRESS NOTE ADULT - ASSESSMENT
73 yo F PMH T2DM on insulin, HTN, HLD, hypothyroidism, RA on Prednisone, Fibromyalgia, cerebral aneurysm s/p repair, chronic hypercapnic respiratory failure s/p trach (vent dependent) and chronic PEG 2022, recurrent C. diff infection (follows with Dr. 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  Pt also noted to have rectal bleeding this past week requiring transfusion 5 days ago in ED as per daughter. Daughter also reports brownish discharge from G-J tube. In ED, pt afebrile, fiO2 96-98% on 40% on ventilator.  Chest X-ray w/ opacification of right lung concerning for possible PNA.  Admitted to RCU for further management. Sputum cxs grew PSA; Treated with course of Cefepime. Patient with decreased H+H received 1 unit of PRBCS on 10/10.  Continue airway management with duonebs, chest PT and suction PRN.        10/17:  Febrile this am to 103 orally.  Blood cultures x2 sent.  C. diff treatment started.  Hypotensive, given IVF bolus.  Pending CT C/A/P to r/o infectious source.  Clogged PEGJ, interventions attempted which did not work, Dr. Rosales aware.  73 yo F PMH T2DM on insulin, HTN, HLD, hypothyroidism, RA on Prednisone, Fibromyalgia, cerebral aneurysm s/p repair, chronic hypercapnic respiratory failure s/p trach (vent dependent) and chronic PEG 2022, recurrent C. diff infection (follows with Dr. 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  Pt also noted to have rectal bleeding this past week requiring transfusion 5 days ago in ED as per daughter. Daughter also reports brownish discharge from G-J tube. In ED, pt afebrile, fiO2 96-98% on 40% on ventilator.  Chest X-ray w/ opacification of right lung concerning for possible PNA.  Admitted to RCU for further management. Sputum cxs grew PSA; treated with course of Cefepime. Patient with decreased H+H received 1 unit of PRBCS on 10/10.  10/14 EGD w/ Dr. Rosales for PEGJ exchange and clippings placed for closure of fistula site.  Persistent fevers treating with meropenem and vanc.  Awaiting CT A/P to r/o sources of infection.  Continue airway management with duonebs, chest PT and suction PRN.        10/18:  Low grade fever today - given IV tylenol.  Blood cultures still pending.  Still unclear of infectious source.  Pending CT A/P w/ contrast.  Continuing meropenem and vanc for now.  PEGJ remains clogged and unable to use - was planned for replacement today at 3pm with Dr. Rosales but was unable to go due to acute state and no CT A/P yet.   75 yo F PMH T2DM on insulin, HTN, HLD, hypothyroidism, RA on Prednisone, Fibromyalgia, cerebral aneurysm s/p repair, chronic hypercapnic respiratory failure s/p trach (vent dependent) and chronic PEG 2022, recurrent C. diff infection (follows with Dr. 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  Pt also noted to have rectal bleeding this past week requiring transfusion 5 days ago in ED as per daughter. Daughter also reports brownish discharge from G-J tube. In ED, pt afebrile, fiO2 96-98% on 40% on ventilator.  Chest X-ray w/ opacification of right lung concerning for possible PNA.  Admitted to RCU for further management. Sputum cxs grew PSA; treated with course of Cefepime. Patient with decreased H+H received 1 unit of PRBCS on 10/10.  10/14 EGD w/ Dr. Rosales for PEGJ exchange and clippings placed for closure of fistula site.  Persistent fevers treating with meropenem and vanc.  Awaiting CT A/P to r/o sources of infection.  Continue airway management with duonebs, chest PT and suction PRN.        10/18:  Low grade fever today - given IV tylenol.  Blood cultures still pending.  Still unclear of infectious source.  CT A/P w/ contrast performed.  Continuing meropenem and vanc for now.  PEGJ remains clogged and unable to use - was planned for replacement today at 3pm with Dr. Rosales but was unable to go due to acute state and no CT A/P yet.  Ok to use G tube for feeds as per Dr. Rosales - will restart feeds and d/c IVF. 75 yo F PMH T2DM on insulin, HTN, HLD, hypothyroidism, RA on Prednisone, Fibromyalgia, cerebral aneurysm s/p repair, chronic hypercapnic respiratory failure s/p trach (vent dependent) and chronic PEG 2022, recurrent C. diff infection (follows with Dr. 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  Pt also noted to have rectal bleeding this past week requiring transfusion 5 days ago in ED as per daughter. Daughter also reports brownish discharge from G-J tube. In ED, pt afebrile, fiO2 96-98% on 40% on ventilator.  Chest X-ray w/ opacification of right lung concerning for possible PNA.  Admitted to RCU for further management. Sputum cxs grew PSA; treated with course of Cefepime. Patient with decreased H+H received 1 unit of PRBCS on 10/10.  10/14 EGD w/ Dr. Rosales for PEGJ exchange and clippings placed for closure of fistula site.  Persistent fevers treating with meropenem and vanc.  Awaiting CT A/P to r/o sources of infection.  Continue airway management with duonebs, chest PT and suction PRN.        10/18:  Low grade fever today - given IV tylenol.  Blood cultures still pending.  Still unclear of infectious source.  CT A/P w/ contrast performed.  Continuing meropenem and vanc for now.  PEGJ remains clogged and unable to use - was planned for replacement today at 3pm with Dr. Rosales but was unable to go due to acute state and no CT A/P yet.  Ok to use G tube for feeds as per Dr. Rosales - will restart feeds and d/c IVF - as per family request feeds at 80ml/hr with 30ml flush q1hrs.

## 2024-10-18 NOTE — PROGRESS NOTE ADULT - PROBLEM SELECTOR PLAN 2
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  No focal intra-abdominal/pelvic or subcutaneous collections  - Occult 10/7: Positive  - Monitor CBC / + Active Type and screen  - Transfused on 10/10 with appropriate response in H+H Patient with Hx of external Hemmorrhoids  - Bleeding Hemmorrhoids noted on admission exam   - G-J Tube flushed and lavaged no evidence of active bleeding   - CT ABD / Pelvis 10/7: Unchanged gastrocutaneous fistula. Gastrojejunostomy tube is intact.  No focal intra-abdominal/pelvic or subcutaneous collections.  - Occult 10/7: Positive  - Transfused on 10/10 with appropriate response in H+H  - Monitor CBC / + Active Type and screen [] PNA  - CXR 10/7: Opacification of the right middle lobe may represent pneumonia versus atelectasis  - sputum culture 10/8 PSA  - completed course of cefepime on 10/12    [] c. diff  - vanco IV started 10/17    [] persistent fevers  - meropenem started since 10/15  - CT A/P pending results

## 2024-10-18 NOTE — PROGRESS NOTE ADULT - SUBJECTIVE AND OBJECTIVE BOX
INTERVAL HPI/OVERNIGHT EVENTS:    Patient's fever improved.  No significant c/o of abdominal pain.  J tube still obstructed.     MEDICATIONS  (STANDING):  albuterol/ipratropium for Nebulization 3 milliLiter(s) Nebulizer every 6 hours  artificial  tears Solution 1 Drop(s) Both EYES every 6 hours  ascorbic acid 500 milliGRAM(s) Oral daily  Biotene Dry Mouth Oral Rinse 5 milliLiter(s) Swish and Spit every 6 hours  calcium carbonate 1250 mG  + Vitamin D (OsCal 500 + D) 1 Tablet(s) Oral <User Schedule>  chlorhexidine 0.12% Liquid 15 milliLiter(s) Oral Mucosa every 12 hours  chlorhexidine 4% Liquid 1 Application(s) Topical daily  dextrose 5% + lactated ringers. 1000 milliLiter(s) (50 mL/Hr) IV Continuous <Continuous>  dextrose 5%. 1000 milliLiter(s) (100 mL/Hr) IV Continuous <Continuous>  dextrose 5%. 1000 milliLiter(s) (50 mL/Hr) IV Continuous <Continuous>  dextrose 50% Injectable 12.5 Gram(s) IV Push once  dextrose 50% Injectable 25 Gram(s) IV Push once  diclofenac sodium 1% Gel 2 Gram(s) Topical every 6 hours  diphenhydrAMINE Injectable 12.5 milliGRAM(s) IV Push <User Schedule>  escitalopram 10 milliGRAM(s) Oral <User Schedule>  fentaNYL   Patch  12 MICROgram(s)/Hr 1 Patch Transdermal every 72 hours  fentaNYL   Patch  25 MICROgram(s)/Hr 1 Patch Transdermal every 72 hours  gabapentin Solution 250 milliGRAM(s) Oral <User Schedule>  glucagon  Injectable 1 milliGRAM(s) IntraMuscular once  hemorrhoidal Ointment 1 Application(s) Rectal at bedtime  influenza  Vaccine (HIGH DOSE) 0.5 milliLiter(s) IntraMuscular once  insulin glargine Injectable (LANTUS) 20 Unit(s) SubCutaneous at bedtime  insulin lispro (ADMELOG) corrective regimen sliding scale   SubCutaneous every 6 hours  lactobacillus acidophilus 1 Tablet(s) Oral <User Schedule>  levothyroxine 125 MICROGram(s) Oral daily  lidocaine   4% Patch 1 Patch Transdermal every 24 hours  magnesium sulfate  IVPB 2 Gram(s) IV Intermittent once  melatonin Liquid 12 milliGRAM(s) Oral <User Schedule>  meropenem  IVPB 1000 milliGRAM(s) IV Intermittent every 8 hours  midodrine 10 milliGRAM(s) Oral every 8 hours  multivitamin/minerals/iron Oral Solution (CENTRUM) 15 milliLiter(s) Enteral Tube daily  pantoprazole  Injectable 40 milliGRAM(s) IV Push every 12 hours  prednisoLONE acetate 1% Suspension 1 Drop(s) Both EYES every 6 hours  predniSONE   Tablet 5 milliGRAM(s) Oral <User Schedule>  QUEtiapine 12.5 milliGRAM(s) Oral <User Schedule>  simethicone 160 milliGRAM(s) Chew <User Schedule>  sodium chloride 1 Gram(s) Oral every 12 hours  sodium chloride 0.65% Nasal 1 Spray(s) Both Nostrils every 6 hours  vancomycin  IVPB 750 milliGRAM(s) IV Intermittent every 12 hours  witch hazel Pads 1 Application(s) Topical every 12 hours    MEDICATIONS  (PRN):  calcium carbonate    500 mG (Tums) Chewable 1 Tablet(s) Chew two times a day PRN Gas  diphenhydrAMINE Elixir 12.5 milliGRAM(s) Oral every 6 hours PRN Rash and/or Itching  oxyCODONE    IR 2.5 milliGRAM(s) Oral every 6 hours PRN Severe Pain (7 - 10)      Allergies    walnut (Unknown)  metronidazole (Rash)  Lyrica (Unknown)  penicillin (Unknown)  Pineapple (Unknown)  Tagamet (Unknown)  heparin (Unknown)  Pecans (Unknown)  Hazelnut (Unknown)  meropenem (Rash)    Intolerances      Vital Signs Last 24 Hrs  T(C): 37.9 (18 Oct 2024 13:01), Max: 37.9 (18 Oct 2024 13:01)  T(F): 100.2 (18 Oct 2024 13:01), Max: 100.2 (18 Oct 2024 13:01)  HR: 98 (18 Oct 2024 15:08) (77 - 116)  BP: 139/61 (18 Oct 2024 12:00) (104/59 - 186/96)  BP(mean): --  RR: 18 (18 Oct 2024 12:00) (18 - 18)  SpO2: 99% (18 Oct 2024 15:08) (98% - 100%)    Parameters below as of 18 Oct 2024 05:28  Patient On (Oxygen Delivery Method): ventilator        PHYSICAL EXAM:  Constitutional: NAD, Tracheostomy   Neck: No LAD, supple  Respiratory: CTAB  Cardiovascular: S1 and S2, RRR,   Gastrointestinal: soft, G tube with J tube extension in place.  LABS:                        7.7    9.20  )-----------( 126      ( 18 Oct 2024 14:48 )             27.0     10-18    140  |  107  |  11  ----------------------------<  162[H]  3.8   |  22  |  <0.30[L]    Ca    7.8[L]      18 Oct 2024 14:48  Phos  3.1     10-18  Mg     1.5     10-18        Urinalysis Basic - ( 18 Oct 2024 14:48 )    Color: x / Appearance: x / SG: x / pH: x  Gluc: 162 mg/dL / Ketone: x  / Bili: x / Urobili: x   Blood: x / Protein: x / Nitrite: x   Leuk Esterase: x / RBC: x / WBC x   Sq Epi: x / Non Sq Epi: x / Bacteria: x          RADIOLOGY & ADDITIONAL TESTS:

## 2024-10-18 NOTE — PROGRESS NOTE ADULT - PROBLEM SELECTOR PLAN 1
- Please monitor blood glucose values q 6 hours while on tube feeding or NPO  - Continue Lantus 20 units at HS while on D5 infusion  -If D5 is discontinued please decrease Lantus to 10 units q hs  - Discontinue TF Admelog insulin doses until pt back on TFs.  - Change Admelog correction scale to low dose q6h while NPO  - Please contact endo once back on TFs for insulin recs.   DISCHARGE PLANNING:  - TBD depending on insulin requirement and discharge tube feeding regimen ( Bolus vs continuous tube feeding )   - If bolus tube feeding > Lantus plus Humalog   - If continuous tube feeding > Lantus plus Regular insulin    - Continue to check blood glucose 4 x daily  - Please make sure patient has all needed supplies: glucometer, test strips, lancets, alcohol swabs, pen needles.   - Followup with Dr Ruth: Endocrinology [facility]: [address] [place] [address]. [address]. [place] [address]. Tel: [phone] Telemedicine

## 2024-10-19 NOTE — PROGRESS NOTE ADULT - PROBLEM SELECTOR PLAN 3
Patient with Hx of external Hemmorrhoids  - Bleeding Hemmorrhoids noted on admission exam   - G-J Tube flushed and lavaged no evidence of active bleeding   - CT ABD / Pelvis 10/7: Unchanged gastrocutaneous fistula. Gastrojejunostomy tube is intact.  No focal intra-abdominal/pelvic or subcutaneous collections.  - Occult 10/7: Positive  - Transfused on 10/10 with appropriate response in H+H  - Monitor CBC / + Active Type and screen

## 2024-10-19 NOTE — PROGRESS NOTE ADULT - PROBLEM SELECTOR PLAN 6
- Continue home prednisone 5 mg daily   - Pt receives Enbrel injections q Thursday (will hold in setting of infection) - Continue home prednisone 5 mg daily; Hydrocortisone 20mg daily while NPO  - Pt receives Enbrel injections q Thursday (will hold in setting of infection)

## 2024-10-19 NOTE — CHART NOTE - NSCHARTNOTEFT_GEN_A_CORE
73 yo F PMH T2DM on insulin, HTN, HLD, hypothyroidism, RA on Prednisone, Fibromyalgia, cerebral aneurysm s/p repair, chronic hypercapnic respiratory failure s/p trach (vent dependent) and chronic PEG 2022, recurrent C. diff infection (follows with Dr. 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 s/p anabiotics and s/p GJ tube exchanges on 10/14 Endocrine consulted for Type 2 DM management while on tube feeding.     Chart reviewed and spoke with team   J- tube still remains clogged, will be off tube feeding for now and also stopping D5W as per team. Noted she is currently on Hydrocortisone 20 mg IV daily ( equivalent dose for Prednisone 5 mg daily) . Overnight BGs were in 100s but BG 290s 10am. Noted pt received Admelog 3 units at 10:30 and 3 units per correction scale at 11:50. Spoke with team to give at least 4 hours between Admelog doses to prevent insulin stacking and hypoglycemia.   Team wanted to decreased Lantus to 15 units instead of 10 units while holding tube feeding and off D5W.       POCT Blood Glucose:  296 mg/dL (10-19-24 @ 11:37)  291 mg/dL (10-19-24 @ 10:03)  175 mg/dL (10-19-24 @ 05:45)  166 mg/dL (10-19-24 @ 03:11)  170 mg/dL (10-19-24 @ 00:59)  179 mg/dL (10-18-24 @ 23:52)  174 mg/dL (10-18-24 @ 22:07)  125 mg/dL (10-18-24 @ 17:26)      eMAR:  hydrocortisone sodium succinate Injectable   20 milliGRAM(s) IV Push (10-19-24 @ 04:25)    insulin glargine Injectable (LANTUS)   20 Unit(s) SubCutaneous (10-18-24 @ 22:30)    insulin lispro (ADMELOG) corrective regimen sliding scale   3 Unit(s) SubCutaneous (10-19-24 @ 11:53)   1 Unit(s) SubCutaneous (10-19-24 @ 05:47)   1 Unit(s) SubCutaneous (10-19-24 @ 01:12)    insulin lispro Injectable (ADMELOG).   3 Unit(s) SubCutaneous (10-19-24 @ 10:29)    levothyroxine Injectable   87.5 MICROGram(s) IV Push (10-19-24 @ 05:38)      Diet, NPO with Tube Feed:   Tube Feeding Modality: Jejunostomy  Casie Frazier Peptide 1.5 ([id number])  Total Volume for 24 Hours (mL): 990  Intermittent  Starting Tube Feed Rate {mL per Hour}: 20  Increase Tube Feed Rate by (mL): 10    Every 4 hours  Until Goal Tube Feed Rate (mL per Hour): 45  Tube Feeding Hours ON: 22  Tube Feeding OFF (Hours): 2  Tube Feed Start Time: 06:00   Rate (mL per Hour): 70   Frequency: Every Hour  Free Water Flush Instructions:  20 cc q 1 hr to prevent j-tube from clogging (10-18-24 @ 19:12) [Active]         # Type 2 diabetes mellitus with hyperglycemia.   - Please monitor blood glucose values q 6 hours while on tube feeding or NPO  - Team decreased Lantus 15 units at HS while off D5W and off tube feeding   - Continue Admelog correction scale to low dose q6h while NPO  - Please contact endo once back on TFs for insulin recs.   - Please keep hypoglycemia protocol in place   - Please change Vancomycin solution to NS instead of Dextrose       Contact via [employer] Teams during business hours  To reach covering provider access AMION via sunrise tools  For Urgent matters/after-hours/weekends/holidays please page endocrine fellow on call   For nonurgent matters please email [email or web address]    Please note that this patient may be followed by different provider tomorrow.  Notify endocrine 24 hours prior to discharge for final recommendations

## 2024-10-19 NOTE — PROGRESS NOTE ADULT - NS ATTEND AMEND GEN_ALL_CORE FT
Pt is a 74F with MHx IDDM2, HTN, HLD, hypothyroidism, RA on Prednisone, fibromyalgia, cerebral aneurysm s/p repair, chronic hypoxemia and hypercapnic respiratory failure, tracheostomy and vent dependent, recurrent C. diff infection, chronic PEG 2022 with persistent GJ tube leaks now s/p GC fistula repair 8/12, recent presentation 5 days PTA for rectal bleeding requiring transfusion in the ED who now presents with acute hypoxemic respiratory distress likely 2/2 RML PNA admitted to the RCU for further management.     Over last 48 hours patient has had relative hypotension requiring IVF resuscitation. She has had a rising procalcitonin which is now > 100. Blood cultures are thus far negative. Her clinical status is very tenuous. In addition her J-tube has clogged. She is pending a CT abdomen/pelvis which will be done this afternoon as more IV access had to be placed.      #Neuro - awake, alert, and interactive by mouthing words  - Was evaluated for hearing loss but unable to cooperate with audiogram - follow-up as outpatient  - History of anxiety and tardive which per daughter was treated and improved with Seroquel and Lexapro but now family noting some of these symptoms (clicking noises and unusual movements again). We discussed medication titration but this may also impact respiratory status  - Continue pain medications as able based on BP  #Cardiovascular - IVF resuscitation as needed and Midodrine if able to have enteral access  - Patient is edematous but will not diurese at this time given worsening clinical status  #Pulmonary - chronic hypoxemic and hypercapnic respiratory failure on mechanical ventilation  - Has been vent dependent but daughter note that in March 2023 she was able to come off the vent for some time at [facility]. We discussed that this may not be possible but we will continue PSV trials as able and if SAFE.   - Has recently had some blood tinged secretions likely due to deep suctioning - will monitor output and minimize suctioning  - Sputum cultures with Pseudomonas in past - repeat sputum culture in lab.  - Continue bronchodilators and airway clearance. Continue trach care.   - Maintain O2 sat > 90%  - Continue to require mechanical ventilation   #Gastro - oropharyngeal tube with GJ tube which was replaced by GI on 10/14. 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 Monitor for drainage  - Patient noted to have anemia of unclear etiology and did receive PRBC during this admission with appropriate response. Has had prior melena which daughter felt was due to hemorrhoids. Follow-up GI.  - Continue PPI  - Daughter has noted bloating and the G tube has been vented at times. Patient would benefit from having a venting system at home for G-tube as she does require G-tube venting to prevent abdominal distension  - EC fistula with leaking of TFs when attempted through G-tube, hold feeds, can use for meds  #Nephro - normal renal function  #Infectious Disease - completed course of antibiotics for bacterial pneumonia (RML) previously  - Patient with fever up to 103 last 48 hours and subsequently with hypotension - will broaden antibiotics and check CT c/a/p if able to do this safely  - Continue Vanco and Marla  - ID follow-up noted - less diarrhea today though patient has been off feeds  - Sacral wound > 2 years. Wound care follow-up  #Hem/Onc - monitor counts and transfuse as needed  - DVT proph with SCDs given recent bleeding and PRBC transfusion  #Endo - DM2 with hyperglycemia - continue insulin and adjust as needed for goal FS < 200  - Endocrine follow-up  - Will continue current feeds and make adjustments to timing if this can be done safely  #Rheum - RA with chronic pain - continue Prednisone and pain management.  - Would hold off Enbrel in setting of fevers      Long term prognosis guarded. Discussed with patient's  and aide at bedside today. RCU team has been in contact with patient's daughter, Marysol, as well.

## 2024-10-19 NOTE — PROGRESS NOTE ADULT - ASSESSMENT
imp/rx:    74 W, resp failure, chronically critically ill.  Fevers, recent PEG intervention.  CT chest, possible pna.    To continue with current abx for now.  Following cultures.

## 2024-10-19 NOTE — CHART NOTE - NSCHARTNOTEFT_GEN_A_CORE
CC:      HPI:  Called by RN because PEG dressing with feed on former peG site, and placed on hold as patient kept coughing, patient also developed hypotension overnight, MICU consult called        Vital Signs Last 24 Hrs  T(C): 36.4 (19 Oct 2024 07:22), Max: 37.9 (18 Oct 2024 13:01)  T(F): 97.5 (19 Oct 2024 07:22), Max: 100.2 (18 Oct 2024 13:01)  HR: 92 (19 Oct 2024 07:22) (77 - 116)  BP: 130/55 (19 Oct 2024 07:22) (76/32 - 139/61)  BP(mean): --  RR: 18 (19 Oct 2024 07:22) (18 - 18)  SpO2: 100% (19 Oct 2024 07:22) (98% - 100%)    Parameters below as of 19 Oct 2024 06:46  Patient On (Oxygen Delivery Method): ventilator          Physical Exam:  General: WN/WD NAD, AOx3, nontoxic appearing  Head:  NC/AT  CV: RRR, S1S2   Respiratory: CTA B/L, nonlabored  Abdominal: (+) bowel sounds x4. Soft, NT, ND, no palpable mass, no guarding, or rebound tenderness  Genitourinary: ? Mcbride   MSK: No BLLE edema, + peripheral pulses, FROM all 4 extremity  Skin: (+) warm, dry   Psych: Appropriate affect       Labs:                        7.2    7.98  )-----------( 100      ( 19 Oct 2024 04:59 )             26.2     10-19    140  |  107  |  12  ----------------------------<  164[H]  3.7   |  21[L]  |  <0.30[L]    Ca    7.4[L]      19 Oct 2024 04:22  Phos  3.3     10-19  Mg     1.8     10-19                Radiology:          Assessment & Plan:  HPI:  73 yo F PMH T2DM on insulin, HTN, HLD, hypothyroidism, RA on Prednisone, Fibromyalgia, cerebral aneurysm s/p repair, chronic hypercapnic respiratory failure s/p trach (vent dependent) and chronic PEG 2022, recurrent C. diff infection (follows with Dr. Newman), persistent GJ tube leaks now s/p GC fistula repair 8/12 BIBEMS with daughter for respiratory distress, hypoxia to high 80s.  Pt also noted to have rectal bleeding this past week requiring transfusion 5 days ago in ED as per daughter.  In ED, pt afebrile, fiO2 96-98% on 40% on ventilator.  Chest Xray w/ opacification of right lung concerning for possible PNA.  Admitted to RCU for further management.  Now with hypotension     (07 Oct 2024 18:58)    Hypotension  Plan  - normal saline 1 liter, bolus patient responded to bolus  Hydrocortisone 20 mg IV times 1 dose as PEG is not functioning and unable to get oral prednisone  MICU consult, see consult notes for further details (not a candidate as BP recovered)  -Discussed with day provider Antonette  -  -Primary Team to follow up in AM, attending to follow       Noreen Hurtado NP  Dept of Medicine

## 2024-10-19 NOTE — PROGRESS NOTE ADULT - SUBJECTIVE AND OBJECTIVE BOX
Patient is a 74y old  Female who presents with a chief complaint of Patient admitted with Hypoxemia and episode of Bright red blood per rectum (19 Oct 2024 07:13)      Interval Events:    REVIEW OF SYSTEMS:  [ ] Positive  [ ] All other systems negative  [ ] Unable to assess ROS because ________    Vital Signs Last 24 Hrs  T(C): 36.4 (10-19-24 @ 07:22), Max: 37.9 (10-18-24 @ 13:01)  T(F): 97.5 (10-19-24 @ 07:22), Max: 100.2 (10-18-24 @ 13:01)  HR: 92 (10-19-24 @ 07:22) (77 - 116)  BP: 130/55 (10-19-24 @ 07:22) (76/32 - 139/61)  RR: 18 (10-19-24 @ 07:22) (18 - 18)  SpO2: 100% (10-19-24 @ 07:22) (98% - 100%)    PHYSICAL EXAM:  HEENT:   [ ]Tracheostomy:  [ ]Pupils equal  [ ]No oral lesions  [ ]Abnormal    SKIN  [ ]No Rash  [ ] Abnormal  [ ] pressure    CARDIAC  [ ]Regular  [ ]Abnormal    PULMONARY  [ ]Bilateral Clear Breath Sounds  [ ]Normal Excursion  [ ]Abnormal    GI  [ ]PEG      [ ] +BS		              [ ]Soft, nondistended, nontender	  [ ]Abnormal    MUSCULOSKELETAL                                   [ ]Bedbound                 [ ]Abnormal    [ ]Ambulatory/OOB to chair                           EXTREMITIES                                         [ ]Normal  [ ]Edema                           NEUROLOGIC  [ ] Normal, non focal  [ ] Focal findings:    PSYCHIATRIC  [ ]Alert and appropriate  [ ] Sedated	 [ ]Agitated    :  Mcbride: [ ] Yes, if yes: Date of Placement:                   [  ] No    LINES: Central Lines [ ] Yes, if yes: Date of Placement                                     [  ] No    HOSPITAL MEDICATIONS:  MEDICATIONS  (STANDING):  albuterol/ipratropium for Nebulization 3 milliLiter(s) Nebulizer every 6 hours  artificial  tears Solution 1 Drop(s) Both EYES every 6 hours  ascorbic acid 500 milliGRAM(s) Oral daily  Biotene Dry Mouth Oral Rinse 5 milliLiter(s) Swish and Spit every 6 hours  calcium carbonate 1250 mG  + Vitamin D (OsCal 500 + D) 1 Tablet(s) Oral <User Schedule>  chlorhexidine 0.12% Liquid 15 milliLiter(s) Oral Mucosa every 12 hours  chlorhexidine 4% Liquid 1 Application(s) Topical daily  dextrose 5% + lactated ringers. 1000 milliLiter(s) (50 mL/Hr) IV Continuous <Continuous>  dextrose 5%. 1000 milliLiter(s) (100 mL/Hr) IV Continuous <Continuous>  dextrose 5%. 1000 milliLiter(s) (50 mL/Hr) IV Continuous <Continuous>  dextrose 50% Injectable 25 Gram(s) IV Push once  dextrose 50% Injectable 12.5 Gram(s) IV Push once  diclofenac sodium 1% Gel 2 Gram(s) Topical every 6 hours  diphenhydrAMINE Injectable 12.5 milliGRAM(s) IV Push <User Schedule>  escitalopram 10 milliGRAM(s) Oral <User Schedule>  fentaNYL   Patch  12 MICROgram(s)/Hr 1 Patch Transdermal every 72 hours  fentaNYL   Patch  25 MICROgram(s)/Hr 1 Patch Transdermal every 72 hours  gabapentin Solution 250 milliGRAM(s) Oral <User Schedule>  glucagon  Injectable 1 milliGRAM(s) IntraMuscular once  hemorrhoidal Ointment 1 Application(s) Rectal at bedtime  influenza  Vaccine (HIGH DOSE) 0.5 milliLiter(s) IntraMuscular once  insulin glargine Injectable (LANTUS) 20 Unit(s) SubCutaneous at bedtime  insulin lispro (ADMELOG) corrective regimen sliding scale   SubCutaneous every 6 hours  insulin lispro Injectable (ADMELOG) 30 Unit(s) SubCutaneous <User Schedule>  lactobacillus acidophilus 1 Tablet(s) Oral <User Schedule>  levothyroxine 125 MICROGram(s) Oral daily  lidocaine   4% Patch 1 Patch Transdermal every 24 hours  melatonin Liquid 12 milliGRAM(s) Oral <User Schedule>  meropenem  IVPB 1000 milliGRAM(s) IV Intermittent every 8 hours  midodrine 10 milliGRAM(s) Oral every 8 hours  multivitamin/minerals/iron Oral Solution (CENTRUM) 15 milliLiter(s) Enteral Tube daily  pancrelipase (VIOKACE) for Occluded enteral feeding tubes 1 Tablet(s) Enteral Tube once  pantoprazole  Injectable 40 milliGRAM(s) IV Push every 12 hours  prednisoLONE acetate 1% Suspension 1 Drop(s) Both EYES every 6 hours  QUEtiapine 12.5 milliGRAM(s) Oral <User Schedule>  simethicone 160 milliGRAM(s) Chew <User Schedule>  sodium bicarbonate  Injectable 50 milliEquivalent(s) IV Push once  sodium chloride 1 Gram(s) Oral every 12 hours  sodium chloride 0.65% Nasal 1 Spray(s) Both Nostrils every 6 hours  sodium chloride 0.9% Bolus 500 milliLiter(s) IV Bolus once  vancomycin  IVPB 750 milliGRAM(s) IV Intermittent every 12 hours  witch hazel Pads 1 Application(s) Topical every 12 hours    MEDICATIONS  (PRN):  calcium carbonate    500 mG (Tums) Chewable 1 Tablet(s) Chew two times a day PRN Gas  diphenhydrAMINE Elixir 12.5 milliGRAM(s) Oral every 6 hours PRN Rash and/or Itching  oxyCODONE    IR 2.5 milliGRAM(s) Oral every 6 hours PRN Severe Pain (7 - 10)      LABS:                        7.2    7.98  )-----------( 100      ( 19 Oct 2024 04:59 )             26.2     10-19    140  |  107  |  12  ----------------------------<  164[H]  3.7   |  21[L]  |  <0.30[L]    Ca    7.4[L]      19 Oct 2024 04:22  Phos  3.3     10-19  Mg     1.8     10-19        Urinalysis Basic - ( 19 Oct 2024 04:22 )    Color: x / Appearance: x / SG: x / pH: x  Gluc: 164 mg/dL / Ketone: x  / Bili: x / Urobili: x   Blood: x / Protein: x / Nitrite: x   Leuk Esterase: x / RBC: x / WBC x   Sq Epi: x / Non Sq Epi: x / Bacteria: x        Venous Blood Gas:  10-19 @ 04:06  7.26/51/63/23/89.7  VBG Lactate: 3.1  Venous Blood Gas:  10-18 @ 14:30  7.33/46/64/24/93.9  VBG Lactate: 3.0    CAPILLARY BLOOD GLUCOSE    MICROBIOLOGY:     RADIOLOGY:  [ ] Reviewed and interpreted by me    Mode: AC/ CMV (Assist Control/ Continuous Mandatory Ventilation)  RR (machine): 18  TV (machine): 350  FiO2: 40  PEEP: 5  ITime: 1  MAP: 10  PIP: 30   Patient is a 74y old  Female who presents with a chief complaint of Patient admitted with Hypoxemia and episode of Bright red blood per rectum (19 Oct 2024 07:13)      Interval Events:  overnight, pt resumed on tube feedings at 20cc/hr via G port however with episode of emesis and leakage from fistula site, then made NPO. Pt then hypotensive to 70s/30s prompting MICU c/s. She received IVF and IV Hydrocortisone in place of PO prednisone with improvement in BP.     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  [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    CARDIAC  [X]Regular  [X]Abnormal - intermittent tachycardia    PULMONARY  [ ]Bilateral Clear Breath Sounds  [ ]Normal Excursion  [X]Abnormal - BL coarse BS    GI  [XPEG      [X] +BS		              [X] soft, nondistended, nontender	  [X]Abnormal - old PEG site dressing c/d/i    MUSCULOSKELETAL                                   [X]Bedbound                 [ ]Abnormal    [ ]Ambulatory/OOB to chair                           EXTREMITIES                                         [ ]Normal  [X]Edema - mild generalized edema                  NEUROLOGIC  [ ] Normal, non focal  [X] Focal findings: lethargic but easily arousable, follows commands     PSYCHIATRIC  [X] lethargic but appropriate  [ ] Sedated	 [ ]Agitated    :  Mcbride: [ ] Yes, if yes: Date of Placement:                   [X] No    LINES: Central Lines [ ] Yes, if yes: Date of Placement                                     [X] No    HOSPITAL MEDICATIONS:  MEDICATIONS  (STANDING):  albuterol/ipratropium for Nebulization 3 milliLiter(s) Nebulizer every 6 hours  artificial  tears Solution 1 Drop(s) Both EYES every 6 hours  ascorbic acid 500 milliGRAM(s) Oral daily  Biotene Dry Mouth Oral Rinse 5 milliLiter(s) Swish and Spit every 6 hours  calcium carbonate 1250 mG  + Vitamin D (OsCal 500 + D) 1 Tablet(s) Oral <User Schedule>  chlorhexidine 0.12% Liquid 15 milliLiter(s) Oral Mucosa every 12 hours  chlorhexidine 4% Liquid 1 Application(s) Topical daily  dextrose 5% + lactated ringers. 1000 milliLiter(s) (50 mL/Hr) IV Continuous <Continuous>  dextrose 5%. 1000 milliLiter(s) (100 mL/Hr) IV Continuous <Continuous>  dextrose 5%. 1000 milliLiter(s) (50 mL/Hr) IV Continuous <Continuous>  dextrose 50% Injectable 25 Gram(s) IV Push once  dextrose 50% Injectable 12.5 Gram(s) IV Push once  diclofenac sodium 1% Gel 2 Gram(s) Topical every 6 hours  diphenhydrAMINE Injectable 12.5 milliGRAM(s) IV Push <User Schedule>  escitalopram 10 milliGRAM(s) Oral <User Schedule>  fentaNYL   Patch  12 MICROgram(s)/Hr 1 Patch Transdermal every 72 hours  fentaNYL   Patch  25 MICROgram(s)/Hr 1 Patch Transdermal every 72 hours  gabapentin Solution 250 milliGRAM(s) Oral <User Schedule>  glucagon  Injectable 1 milliGRAM(s) IntraMuscular once  hemorrhoidal Ointment 1 Application(s) Rectal at bedtime  influenza  Vaccine (HIGH DOSE) 0.5 milliLiter(s) IntraMuscular once  insulin glargine Injectable (LANTUS) 20 Unit(s) SubCutaneous at bedtime  insulin lispro (ADMELOG) corrective regimen sliding scale   SubCutaneous every 6 hours  insulin lispro Injectable (ADMELOG) 30 Unit(s) SubCutaneous <User Schedule>  lactobacillus acidophilus 1 Tablet(s) Oral <User Schedule>  levothyroxine 125 MICROGram(s) Oral daily  lidocaine   4% Patch 1 Patch Transdermal every 24 hours  melatonin Liquid 12 milliGRAM(s) Oral <User Schedule>  meropenem  IVPB 1000 milliGRAM(s) IV Intermittent every 8 hours  midodrine 10 milliGRAM(s) Oral every 8 hours  multivitamin/minerals/iron Oral Solution (CENTRUM) 15 milliLiter(s) Enteral Tube daily  pancrelipase (VIOKACE) for Occluded enteral feeding tubes 1 Tablet(s) Enteral Tube once  pantoprazole  Injectable 40 milliGRAM(s) IV Push every 12 hours  prednisoLONE acetate 1% Suspension 1 Drop(s) Both EYES every 6 hours  QUEtiapine 12.5 milliGRAM(s) Oral <User Schedule>  simethicone 160 milliGRAM(s) Chew <User Schedule>  sodium bicarbonate  Injectable 50 milliEquivalent(s) IV Push once  sodium chloride 1 Gram(s) Oral every 12 hours  sodium chloride 0.65% Nasal 1 Spray(s) Both Nostrils every 6 hours  sodium chloride 0.9% Bolus 500 milliLiter(s) IV Bolus once  vancomycin  IVPB 750 milliGRAM(s) IV Intermittent every 12 hours  witch hazel Pads 1 Application(s) Topical every 12 hours    MEDICATIONS  (PRN):  calcium carbonate    500 mG (Tums) Chewable 1 Tablet(s) Chew two times a day PRN Gas  diphenhydrAMINE Elixir 12.5 milliGRAM(s) Oral every 6 hours PRN Rash and/or Itching  oxyCODONE    IR 2.5 milliGRAM(s) Oral every 6 hours PRN Severe Pain (7 - 10)      LABS:                        7.2    7.98  )-----------( 100      ( 19 Oct 2024 04:59 )             26.2     10-19    140  |  107  |  12  ----------------------------<  164[H]  3.7   |  21[L]  |  <0.30[L]    Ca    7.4[L]      19 Oct 2024 04:22  Phos  3.3     10-19  Mg     1.8     10-19        Urinalysis Basic - ( 19 Oct 2024 04:22 )    Color: x / Appearance: x / SG: x / pH: x  Gluc: 164 mg/dL / Ketone: x  / Bili: x / Urobili: x   Blood: x / Protein: x / Nitrite: x   Leuk Esterase: x / RBC: x / WBC x   Sq Epi: x / Non Sq Epi: x / Bacteria: x        Venous Blood Gas:  10-19 @ 04:06  7.26/51/63/23/89.7  VBG Lactate: 3.1  Venous Blood Gas:  10-18 @ 14:30  7.33/46/64/24/93.9  VBG Lactate: 3.0    CAPILLARY BLOOD GLUCOSE    MICROBIOLOGY:     RADIOLOGY:  [ ] Reviewed and interpreted by me    Mode: AC/ CMV (Assist Control/ Continuous Mandatory Ventilation)  RR (machine): 18  TV (machine): 350  FiO2: 40  PEEP: 5  ITime: 1  MAP: 10  PIP: 30

## 2024-10-19 NOTE — CONSULT NOTE ADULT - SUBJECTIVE AND OBJECTIVE BOX
Patient:  MAXX LOPEZ  [id number]    CHIEF COMPLAINT: AHRF and hematochezia    HPI: 75 yo F PMH T2DM on insulin, HTN, HLD, hypothyroidism, RA on Prednisone, Fibromyalgia, cerebral aneurysm s/p repair, chronic hypercapnic respiratory failure s/p trach (vent dependent) and chronic PEG 2022, recurrent C. diff infection (follows with Dr. Newman), persistent GJ tube leaks now s/p GC fistula repair 8/12 BIBEMS with daughter for respiratory distress, hypoxia to high 80s.  Pt also noted to have rectal bleeding this past week requiring transfusion 5 days ago in ED as per daughter.  In ED, pt afebrile, fiO2 96-98% on 40% on ventilator.  Chest Xray w/ opacification of right lung concerning for possible PNA.  Admitted to [facility] for further management.     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      H/O tracheostomy      PEG (percutaneous endoscopic gastrostomy) status          FAMILY HISTORY:      SOCIAL HISTORY:    Allergies    walnut (Unknown)  metronidazole (Rash)  Lyrica (Unknown)  penicillin (Unknown)  Pineapple (Unknown)  Tagamet (Unknown)  heparin (Unknown)  Pecans (Unknown)  Hazelnut (Unknown)  meropenem (Rash)    Intolerances        HOME MEDICATIONS:    REVIEW OF SYSTEMS:  [X] Unable to assess ROS because pt's baseline status  [ ] Negative except as stated in HPI  CONSTITUTIONAL: No fever, chills, night sweats, or fatigue  EYES: No eye pain, visual disturbances, or discharge  ENMT:  No difficulty hearing, tinnitus, vertigo; No sinus or throat pain  NECK: No pain or stiffness  BREASTS: No pain, masses, or nipple discharge  RESPIRATORY: No cough, wheezing, or hemoptysis; No shortness of breath  CARDIOVASCULAR: No chest pain, palpitations, dizziness, or leg swelling  GASTROINTESTINAL: No abdominal or epigastric pain. No nausea, vomiting, or hematemesis; No diarrhea or constipation. No melena or hematochezia.  GENITOURINARY: No dysuria, frequency, hematuria, or incontinence  NEUROLOGICAL: No headaches, memory loss, loss of strength, numbness, or tremors  SKIN: No itching, burning, rashes, or lesions   LYMPH NODES: No enlarged glands  ENDOCRINE: No heat or cold intolerance; No hair loss  MUSCULOSKELETAL: No joint pain or swelling; No muscle, back, or extremity pain  PSYCHIATRIC: No depression, anxiety, mood swings, or difficulty sleeping  HEME/LYMPH: No easy bruising, or bleeding gums  ALLERGY AND IMMUNOLOGIC: No hives or eczema    OBJECTIVE:  T(F): 98 (10-19-24 @ 02:47), Max: 100.2 (10-18-24 @ 13:01)  HR: 89 (10-19-24 @ 03:40) (77 - 116)  BP: 106/34 (10-19-24 @ 03:40) (76/32 - 139/61)  BP(mean): --  ABP: --  ABP(mean): --  RR: 18 (10-19-24 @ 02:47) (18 - 18)  SpO2: 100% (10-19-24 @ 02:47) (98% - 100%)  CVP(mm Hg): --  Mode: AC/ CMV (Assist Control/ Continuous Mandatory Ventilation), RR (machine): 18, TV (machine): 350, FiO2: 40, PEEP: 5, ITime: 1, MAP: 10, PIP: 30  I/O Summary 24H    IN: 1450 mL / OUT: 975 mL / NET: 475 mL      CAPILLARY BLOOD GLUCOSE      POCT Blood Glucose.: 166 mg/dL (19 Oct 2024 03:11)      PHYSICAL EXAM:  GENERAL: NAD, lying in bed comfortably, trach in place  HEAD:  Atraumatic, Normocephalic  EYES: EOMI, conjunctiva and sclera clear  ENT: Moist mucous membranes  NECK: Supple, No JVD  CHEST/LUNG: Clear to auscultation bilaterally; No rales, rhonchi, wheezing, or rubs. Unlabored respirations  HEART: Regular rate and rhythm; No murmurs, rubs, or gallops  ABDOMEN: Bowel sounds present; Soft, Nontender, Nondistended. No hepatomegaly  EXTREMITIES:  2+ Peripheral Pulses, brisk capillary refill. No clubbing, cyanosis, or edema  NERVOUS SYSTEM:  Alert & Oriented X3, speech clear. No deficits   MSK: FROM all 4 extremities, full and equal strength  SKIN: No rashes or lesions    HOSPITAL MEDICATIONS:  MEDICATIONS  (STANDING):  albuterol/ipratropium for Nebulization 3 milliLiter(s) Nebulizer every 6 hours  artificial  tears Solution 1 Drop(s) Both EYES every 6 hours  ascorbic acid 500 milliGRAM(s) Oral daily  Biotene Dry Mouth Oral Rinse 5 milliLiter(s) Swish and Spit every 6 hours  calcium carbonate 1250 mG  + Vitamin D (OsCal 500 + D) 1 Tablet(s) Oral <User Schedule>  chlorhexidine 0.12% Liquid 15 milliLiter(s) Oral Mucosa every 12 hours  chlorhexidine 4% Liquid 1 Application(s) Topical daily  dextrose 5% + lactated ringers. 1000 milliLiter(s) (50 mL/Hr) IV Continuous <Continuous>  dextrose 5%. 1000 milliLiter(s) (50 mL/Hr) IV Continuous <Continuous>  dextrose 5%. 1000 milliLiter(s) (100 mL/Hr) IV Continuous <Continuous>  dextrose 50% Injectable 25 Gram(s) IV Push once  dextrose 50% Injectable 12.5 Gram(s) IV Push once  diclofenac sodium 1% Gel 2 Gram(s) Topical every 6 hours  diphenhydrAMINE Injectable 12.5 milliGRAM(s) IV Push <User Schedule>  escitalopram 10 milliGRAM(s) Oral <User Schedule>  fentaNYL   Patch  12 MICROgram(s)/Hr 1 Patch Transdermal every 72 hours  fentaNYL   Patch  25 MICROgram(s)/Hr 1 Patch Transdermal every 72 hours  gabapentin Solution 250 milliGRAM(s) Oral <User Schedule>  glucagon  Injectable 1 milliGRAM(s) IntraMuscular once  hemorrhoidal Ointment 1 Application(s) Rectal at bedtime  hydrocortisone sodium succinate Injectable 20 milliGRAM(s) IV Push once  influenza  Vaccine (HIGH DOSE) 0.5 milliLiter(s) IntraMuscular once  insulin glargine Injectable (LANTUS) 20 Unit(s) SubCutaneous at bedtime  insulin lispro (ADMELOG) corrective regimen sliding scale   SubCutaneous every 6 hours  insulin lispro Injectable (ADMELOG) 30 Unit(s) SubCutaneous <User Schedule>  lactobacillus acidophilus 1 Tablet(s) Oral <User Schedule>  levothyroxine 125 MICROGram(s) Oral daily  levothyroxine Injectable 87.5 MICROGram(s) IV Push once  lidocaine   4% Patch 1 Patch Transdermal every 24 hours  magnesium sulfate  IVPB 2 Gram(s) IV Intermittent once  melatonin Liquid 12 milliGRAM(s) Oral <User Schedule>  meropenem  IVPB 1000 milliGRAM(s) IV Intermittent every 8 hours  midodrine 10 milliGRAM(s) Oral every 8 hours  multivitamin/minerals/iron Oral Solution (CENTRUM) 15 milliLiter(s) Enteral Tube daily  pantoprazole  Injectable 40 milliGRAM(s) IV Push every 12 hours  prednisoLONE acetate 1% Suspension 1 Drop(s) Both EYES every 6 hours  predniSONE   Tablet 5 milliGRAM(s) Oral <User Schedule>  QUEtiapine 12.5 milliGRAM(s) Oral <User Schedule>  simethicone 160 milliGRAM(s) Chew <User Schedule>  sodium chloride 1 Gram(s) Oral every 12 hours  sodium chloride 0.65% Nasal 1 Spray(s) Both Nostrils every 6 hours  sodium chloride 0.9% Bolus 500 milliLiter(s) IV Bolus once  vancomycin  IVPB 750 milliGRAM(s) IV Intermittent every 12 hours  witch hazel Pads 1 Application(s) Topical every 12 hours    MEDICATIONS  (PRN):  calcium carbonate    500 mG (Tums) Chewable 1 Tablet(s) Chew two times a day PRN Gas  diphenhydrAMINE Elixir 12.5 milliGRAM(s) Oral every 6 hours PRN Rash and/or Itching  oxyCODONE    IR 2.5 milliGRAM(s) Oral every 6 hours PRN Severe Pain (7 - 10)      LABS:  CBC 10-18-24 @ 14:48                        7.7    9.20  )-----------( 126                   27.0     Hgb trend: 7.7 <-- , 7.5 <-- , 9.5 <-- , 8.4 <--   WBC trend: 9.20 <-- , 8.68 <-- , 15.02 <-- , 14.26 <--     CMP 10-18-24 @ 14:48    140  |  107  |  11  ----------------------------<  162[H]  3.8   |  22  |  <0.30[L]    Ca    7.8[L]      10-18-24 @ 14:48  Phos  3.1     10-18  Mg     1.5     10-18      Serum Cr (eGFR) trend: <0.30 (113) <-- , <0.30 (113) <-- , <0.30 (113) <-- , <0.30 (113) <--         ABG Trend:     VBG Trend:   10-18-24 @ 14:30 - pH: 7.33  | pCO2: 46    | pO2: 64    | HCO3: 24    | Lactate: 3.0        MICROBIOLOGY:       RADIOLOGY:  [ ] Reviewed and interpreted by me    EKG Patient:  MAXX LOPEZ  [id number]    CHIEF COMPLAINT: AHRF and hematochezia    HPI: 73 yo F PMH T2DM on insulin, HTN, HLD, hypothyroidism, RA on Prednisone, Fibromyalgia, cerebral aneurysm s/p repair, chronic hypercapnic respiratory failure s/p trach (vent dependent) and chronic PEG 2022, recurrent C. diff infection (follows with Dr. Newman), persistent GJ tube leaks now s/p GC fistula repair 8/12 BIBEMS with daughter for respiratory distress, hypoxia to high 80s.  Pt also noted to have rectal bleeding this past week requiring transfusion 5 days ago in ED as per daughter.  In ED, pt afebrile, fiO2 96-98% on 40% on ventilator.  Chest Xray w/ opacification of right lung concerning for possible PNA.  Admitted to [facility]CU for further management.     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      H/O tracheostomy      PEG (percutaneous endoscopic gastrostomy) status          FAMILY HISTORY:      SOCIAL HISTORY:    Allergies    walnut (Unknown)  metronidazole (Rash)  Lyrica (Unknown)  penicillin (Unknown)  Pineapple (Unknown)  Tagamet (Unknown)  heparin (Unknown)  Pecans (Unknown)  Hazelnut (Unknown)  meropenem (Rash)    Intolerances        HOME MEDICATIONS:    REVIEW OF SYSTEMS:  [X] Unable to assess ROS because pt's baseline status  [ ] Negative except as stated in HPI  CONSTITUTIONAL: No fever, chills, night sweats, or fatigue  EYES: No eye pain, visual disturbances, or discharge  ENMT:  No difficulty hearing, tinnitus, vertigo; No sinus or throat pain  NECK: No pain or stiffness  BREASTS: No pain, masses, or nipple discharge  RESPIRATORY: No cough, wheezing, or hemoptysis; No shortness of breath  CARDIOVASCULAR: No chest pain, palpitations, dizziness, or leg swelling  GASTROINTESTINAL: No abdominal or epigastric pain. No nausea, vomiting, or hematemesis; No diarrhea or constipation. No melena or hematochezia.  GENITOURINARY: No dysuria, frequency, hematuria, or incontinence  NEUROLOGICAL: No headaches, memory loss, loss of strength, numbness, or tremors  SKIN: No itching, burning, rashes, or lesions   LYMPH NODES: No enlarged glands  ENDOCRINE: No heat or cold intolerance; No hair loss  MUSCULOSKELETAL: No joint pain or swelling; No muscle, back, or extremity pain  PSYCHIATRIC: No depression, anxiety, mood swings, or difficulty sleeping  HEME/LYMPH: No easy bruising, or bleeding gums  ALLERGY AND IMMUNOLOGIC: No hives or eczema    OBJECTIVE:  T(F): 98 (10-19-24 @ 02:47), Max: 100.2 (10-18-24 @ 13:01)  HR: 89 (10-19-24 @ 03:40) (77 - 116)  BP: 106/34 (10-19-24 @ 03:40) (76/32 - 139/61)  BP(mean): --  ABP: --  ABP(mean): --  RR: 18 (10-19-24 @ 02:47) (18 - 18)  SpO2: 100% (10-19-24 @ 02:47) (98% - 100%)  CVP(mm Hg): --  Mode: AC/ CMV (Assist Control/ Continuous Mandatory Ventilation), RR (machine): 18, TV (machine): 350, FiO2: 40, PEEP: 5, ITime: 1, MAP: 10, PIP: 30  I/O Summary 24H    IN: 1450 mL / OUT: 975 mL / NET: 475 mL      CAPILLARY BLOOD GLUCOSE      POCT Blood Glucose.: 166 mg/dL (19 Oct 2024 03:11)      PHYSICAL EXAM:  GENERAL: NAD, lying in bed comfortably, trach to vent   HEAD:  Atraumatic, Normocephalic  ENT: Moist mucous membranes  NECK: Supple, trach in place no signs of infection   CHEST/LUNG: Clear to auscultation bilaterally; No rales, rhonchi, wheezing, or rubs. Unlabored respirations  HEART: Regular rate and rhythm; No murmurs, rubs, or gallops  ABDOMEN:  Soft, Nontender  EXTREMITIES:  2+ Peripheral Pulses, brisk capillary refill. No clubbing, cyanosis, + edema to all extremities   NERVOUS SYSTEM:  Alert mouthing words   SKIN: No rashes    HOSPITAL MEDICATIONS:  MEDICATIONS  (STANDING):  albuterol/ipratropium for Nebulization 3 milliLiter(s) Nebulizer every 6 hours  artificial  tears Solution 1 Drop(s) Both EYES every 6 hours  ascorbic acid 500 milliGRAM(s) Oral daily  Biotene Dry Mouth Oral Rinse 5 milliLiter(s) Swish and Spit every 6 hours  calcium carbonate 1250 mG  + Vitamin D (OsCal 500 + D) 1 Tablet(s) Oral <User Schedule>  chlorhexidine 0.12% Liquid 15 milliLiter(s) Oral Mucosa every 12 hours  chlorhexidine 4% Liquid 1 Application(s) Topical daily  dextrose 5% + lactated ringers. 1000 milliLiter(s) (50 mL/Hr) IV Continuous <Continuous>  dextrose 5%. 1000 milliLiter(s) (50 mL/Hr) IV Continuous <Continuous>  dextrose 5%. 1000 milliLiter(s) (100 mL/Hr) IV Continuous <Continuous>  dextrose 50% Injectable 25 Gram(s) IV Push once  dextrose 50% Injectable 12.5 Gram(s) IV Push once  diclofenac sodium 1% Gel 2 Gram(s) Topical every 6 hours  diphenhydrAMINE Injectable 12.5 milliGRAM(s) IV Push <User Schedule>  escitalopram 10 milliGRAM(s) Oral <User Schedule>  fentaNYL   Patch  12 MICROgram(s)/Hr 1 Patch Transdermal every 72 hours  fentaNYL   Patch  25 MICROgram(s)/Hr 1 Patch Transdermal every 72 hours  gabapentin Solution 250 milliGRAM(s) Oral <User Schedule>  glucagon  Injectable 1 milliGRAM(s) IntraMuscular once  hemorrhoidal Ointment 1 Application(s) Rectal at bedtime  hydrocortisone sodium succinate Injectable 20 milliGRAM(s) IV Push once  influenza  Vaccine (HIGH DOSE) 0.5 milliLiter(s) IntraMuscular once  insulin glargine Injectable (LANTUS) 20 Unit(s) SubCutaneous at bedtime  insulin lispro (ADMELOG) corrective regimen sliding scale   SubCutaneous every 6 hours  insulin lispro Injectable (ADMELOG) 30 Unit(s) SubCutaneous <User Schedule>  lactobacillus acidophilus 1 Tablet(s) Oral <User Schedule>  levothyroxine 125 MICROGram(s) Oral daily  levothyroxine Injectable 87.5 MICROGram(s) IV Push once  lidocaine   4% Patch 1 Patch Transdermal every 24 hours  magnesium sulfate  IVPB 2 Gram(s) IV Intermittent once  melatonin Liquid 12 milliGRAM(s) Oral <User Schedule>  meropenem  IVPB 1000 milliGRAM(s) IV Intermittent every 8 hours  midodrine 10 milliGRAM(s) Oral every 8 hours  multivitamin/minerals/iron Oral Solution (CENTRUM) 15 milliLiter(s) Enteral Tube daily  pantoprazole  Injectable 40 milliGRAM(s) IV Push every 12 hours  prednisoLONE acetate 1% Suspension 1 Drop(s) Both EYES every 6 hours  predniSONE   Tablet 5 milliGRAM(s) Oral <User Schedule>  QUEtiapine 12.5 milliGRAM(s) Oral <User Schedule>  simethicone 160 milliGRAM(s) Chew <User Schedule>  sodium chloride 1 Gram(s) Oral every 12 hours  sodium chloride 0.65% Nasal 1 Spray(s) Both Nostrils every 6 hours  sodium chloride 0.9% Bolus 500 milliLiter(s) IV Bolus once  vancomycin  IVPB 750 milliGRAM(s) IV Intermittent every 12 hours  witch hazel Pads 1 Application(s) Topical every 12 hours    MEDICATIONS  (PRN):  calcium carbonate    500 mG (Tums) Chewable 1 Tablet(s) Chew two times a day PRN Gas  diphenhydrAMINE Elixir 12.5 milliGRAM(s) Oral every 6 hours PRN Rash and/or Itching  oxyCODONE    IR 2.5 milliGRAM(s) Oral every 6 hours PRN Severe Pain (7 - 10)      LABS:  CBC 10-18-24 @ 14:48                        7.7    9.20  )-----------( 126                   27.0     Hgb trend: 7.7 <-- , 7.5 <-- , 9.5 <-- , 8.4 <--   WBC trend: 9.20 <-- , 8.68 <-- , 15.02 <-- , 14.26 <--     CMP 10-18-24 @ 14:48    140  |  107  |  11  ----------------------------<  162[H]  3.8   |  22  |  <0.30[L]    Ca    7.8[L]      10-18-24 @ 14:48  Phos  3.1     10-18  Mg     1.5     10-18      Serum Cr (eGFR) trend: <0.30 (113) <-- , <0.30 (113) <-- , <0.30 (113) <-- , <0.30 (113) <--         ABG Trend:     VBG Trend:   10-18-24 @ 14:30 - pH: 7.33  | pCO2: 46    | pO2: 64    | HCO3: 24    | Lactate: 3.0        MICROBIOLOGY:       RADIOLOGY:  [ ] Reviewed and interpreted by panda LAFLEUR

## 2024-10-19 NOTE — PROGRESS NOTE ADULT - SUBJECTIVE AND OBJECTIVE BOX
INFECTIOUS DISEASES FOLLOW UP--Kel Gleason MD  Pager [phone]    This is a follow up note for this  74y Female with  Acute respiratory distress syndrome (ARDS)  intermittent fevers  h/o c.diff    Further ROS:  limited    Allergies    walnut (Unknown)  metronidazole (Rash)  Lyrica (Unknown)  penicillin (Unknown)  Pineapple (Unknown)  Tagamet (Unknown)  heparin (Unknown)  Pecans (Unknown)  Hazelnut (Unknown)  meropenem (Rash)    ANTIBIOTICS/RELEVANT:  antimicrobials  meropenem  IVPB 1000 milliGRAM(s) IV Intermittent every 8 hours  vancomycin  IVPB 750 milliGRAM(s) IV Intermittent every 12 hours    immunologic:  influenza  Vaccine (HIGH DOSE) 0.5 milliLiter(s) IntraMuscular once    OTHER:  albuterol/ipratropium for Nebulization 3 milliLiter(s) Nebulizer every 6 hours  artificial  tears Solution 1 Drop(s) Both EYES every 6 hours  ascorbic acid 500 milliGRAM(s) Oral daily  Biotene Dry Mouth Oral Rinse 5 milliLiter(s) Swish and Spit every 6 hours  calcium carbonate    500 mG (Tums) Chewable 1 Tablet(s) Chew two times a day PRN  calcium carbonate 1250 mG  + Vitamin D (OsCal 500 + D) 1 Tablet(s) Oral <User Schedule>  chlorhexidine 0.12% Liquid 15 milliLiter(s) Oral Mucosa every 12 hours  chlorhexidine 4% Liquid 1 Application(s) Topical daily  dextrose 5% + lactated ringers. 1000 milliLiter(s) IV Continuous <Continuous>  dextrose 5%. 1000 milliLiter(s) IV Continuous <Continuous>  dextrose 5%. 1000 milliLiter(s) IV Continuous <Continuous>  dextrose 50% Injectable 25 Gram(s) IV Push once  dextrose 50% Injectable 12.5 Gram(s) IV Push once  diclofenac sodium 1% Gel 2 Gram(s) Topical every 6 hours  diphenhydrAMINE Elixir 12.5 milliGRAM(s) Oral every 6 hours PRN  diphenhydrAMINE Injectable 12.5 milliGRAM(s) IV Push <User Schedule>  escitalopram 10 milliGRAM(s) Oral <User Schedule>  fentaNYL   Patch  12 MICROgram(s)/Hr 1 Patch Transdermal every 72 hours  fentaNYL   Patch  25 MICROgram(s)/Hr 1 Patch Transdermal every 72 hours  gabapentin Solution 250 milliGRAM(s) Oral <User Schedule>  glucagon  Injectable 1 milliGRAM(s) IntraMuscular once  hemorrhoidal Ointment 1 Application(s) Rectal at bedtime  insulin glargine Injectable (LANTUS) 20 Unit(s) SubCutaneous at bedtime  insulin lispro (ADMELOG) corrective regimen sliding scale   SubCutaneous every 6 hours  insulin lispro Injectable (ADMELOG) 30 Unit(s) SubCutaneous <User Schedule>  lactobacillus acidophilus 1 Tablet(s) Oral <User Schedule>  levothyroxine 125 MICROGram(s) Oral daily  lidocaine   4% Patch 1 Patch Transdermal every 24 hours  melatonin Liquid 12 milliGRAM(s) Oral <User Schedule>  midodrine 10 milliGRAM(s) Oral every 8 hours  multivitamin/minerals/iron Oral Solution (CENTRUM) 15 milliLiter(s) Enteral Tube daily  oxyCODONE    IR 2.5 milliGRAM(s) Oral every 6 hours PRN  pantoprazole  Injectable 40 milliGRAM(s) IV Push every 12 hours  prednisoLONE acetate 1% Suspension 1 Drop(s) Both EYES every 6 hours  predniSONE   Tablet 5 milliGRAM(s) Oral <User Schedule>  QUEtiapine 12.5 milliGRAM(s) Oral <User Schedule>  simethicone 160 milliGRAM(s) Chew <User Schedule>  sodium chloride 1 Gram(s) Oral every 12 hours  sodium chloride 0.65% Nasal 1 Spray(s) Both Nostrils every 6 hours  sodium chloride 0.9% Bolus 500 milliLiter(s) IV Bolus once  witch hazel Pads 1 Application(s) Topical every 12 hours    Objective:  Vital Signs Last 24 Hrs  T(C): 36.4 (19 Oct 2024 06:46), Max: 37.9 (18 Oct 2024 13:01)  T(F): 97.5 (19 Oct 2024 06:46), Max: 100.2 (18 Oct 2024 13:01)  HR: 92 (19 Oct 2024 06:46) (77 - 116)  BP: 121/51 (19 Oct 2024 06:46) (76/32 - 139/61)  BP(mean): --  RR: 18 (19 Oct 2024 06:46) (18 - 18)  SpO2: 100% (19 Oct 2024 06:46) (98% - 100%)    Parameters below as of 19 Oct 2024 06:46  Patient On (Oxygen Delivery Method): ventilator    PHYSICAL EXAM:  Trach  Constitutional:no acute distress  Ear/Nose/Throat: no oral lesions, 	  Respiratory: clear BL ant  Cardiovascular: S1S2  Gastrointestinal:soft, (+) BS, mild distension  Extremities: bl edema  No Lymphadenopathy  IV sites not inflammed.    LABS:                        7.2    7.98  )-----------( 100      ( 19 Oct 2024 04:59 )             26.2     10-19    140  |  107  |  12  ----------------------------<  164[H]  3.7   |  21[L]  |  <0.30[L]    Ca    7.4[L]      19 Oct 2024 04:22  Phos  3.3     10-19  Mg     1.8     10-19    Urinalysis Basic - ( 19 Oct 2024 04:22 )    Color: x / Appearance: x / SG: x / pH: x  Gluc: 164 mg/dL / Ketone: x  / Bili: x / Urobili: x   Blood: x / Protein: x / Nitrite: x   Leuk Esterase: x / RBC: x / WBC x   Sq Epi: x / Non Sq Epi: x / Bacteria: x    MICROBIOLOGY: no new + bc    RADIOLOGY & ADDITIONAL STUDIES:    < from: CT Chest w/ IV Cont (10.18.24 @ 17:18) >    IMPRESSION:  Tracheostomy in situ. Similar-appearing narrowing of the right and left   mainstem bronchi and segmental bronchi with distal airway impaction.   There are extensive bilateral scattered patchy consolidations which are   increased from last prior exam of 10/7/2024. Findings are suspicious for   multifocal pneumonia .    Small bilateralpleural effusions.    Mild wall thickening of the rectum with trace perirectal haziness which   may be secondary to underdistention versus proctitis.    Unchanged gastrocutaneous fistula.        < end of copied text >

## 2024-10-19 NOTE — PROGRESS NOTE ADULT - PROBLEM SELECTOR PLAN 4
- Amolodipine d/c'd 10/10 in setting of borderline bp   - required IVF boluses for hypotension  - Monitor BP

## 2024-10-19 NOTE — PROVIDER CONTACT NOTE (OTHER) - ASSESSMENT
Pt mentation remains unchanged, does not feel symptomatic based off presentation. no feed tinged secretions upon suctioning

## 2024-10-19 NOTE — PROGRESS NOTE ADULT - ASSESSMENT
73 yo F PMH T2DM on insulin, HTN, HLD, hypothyroidism, RA on Prednisone, Fibromyalgia, cerebral aneurysm s/p repair, chronic hypercapnic respiratory failure s/p trach (vent dependent) and chronic PEG 2022, recurrent C. diff infection (follows with Dr. 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  Pt also noted to have rectal bleeding this past week requiring transfusion 5 days ago in ED as per daughter. Daughter also reports brownish discharge from G-J tube. In ED, pt afebrile, fiO2 96-98% on 40% on ventilator.  Chest X-ray w/ opacification of right lung concerning for possible PNA.  Admitted to RCU for further management. Sputum cxs grew PSA; treated with course of Cefepime. Patient with decreased H+H received 1 unit of PRBCS on 10/10.  10/14 EGD w/ Dr. Rosales for PEGJ exchange and clippings placed for closure of fistula site.  Persistent fevers treating with meropenem and vanc.  Awaiting CT A/P to r/o sources of infection.  Continue airway management with duonebs, chest PT and suction PRN.        10/18:  Low grade fever today - given IV tylenol.  Blood cultures still pending.  Still unclear of infectious source.  CT A/P w/ contrast performed.  Continuing meropenem and vanc for now.  PEGJ remains clogged and unable to use - was planned for replacement today at 3pm with Dr. Rosales but was unable to go due to acute state and no CT A/P yet.  Ok to use G tube for feeds as per Dr. Rosales - will restart feeds and d/c IVF - as per family request feeds at 80ml/hr with 30ml flush q1hrs. 73 yo F PMH T2DM on insulin, HTN, HLD, hypothyroidism, RA on Prednisone, Fibromyalgia, cerebral aneurysm s/p repair, chronic hypercapnic respiratory failure s/p trach (vent dependent) and chronic PEG 2022, recurrent C. diff infection (follows with Dr. 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  Pt also noted to have rectal bleeding this past week requiring transfusion 5 days ago in ED as per daughter. Daughter also reports brownish discharge from G-J tube. In ED, pt afebrile, fiO2 96-98% on 40% on ventilator.  Chest X-ray w/ opacification of right lung concerning for possible PNA.  Admitted to RCU for further management. Sputum cxs grew PSA; treated with course of Cefepime. Patient with decreased H+H received 1 unit of PRBCS on 10/10.  10/14 EGD w/ Dr. Rosales for PEGJ exchange and clippings placed for closure of fistula site.  Persistent fevers treating with meropenem and vanc.  Awaiting CT A/P to r/o sources of infection.  Continue airway management with duonebs, chest PT and suction PRN.        10/18:  Low grade fever today - given IV tylenol.  Blood cultures still pending.  Still unclear of infectious source.  CT A/P w/ contrast performed.  Continuing meropenem and vanc for now.  PEGJ remains clogged and unable to use - was planned for replacement today at 3pm with Dr. Rosales but was unable to go due to acute state and no CT A/P yet.  Ok to use G tube for feeds as per Dr. Rosales - will restart feeds and d/c IVF - as per family request feeds at 80ml/hr with 30ml flush q1hrs.  10/19: Overnight events as above, remains NPO. Unable to administer Viokace via J tube today d/t resistance. Will attempt PO midodrine via G/suction port which has been OK'd for use as per Dr. Rosales. Will hold feeds for now pending GI procedure Monday or Tuesday. Pt appears to be volume overloaded - will d/c D5LR, decrease Lantus dose and administer Midodrine; if BP stable will consider diuretics. Discussed plan with daughter at bedside.

## 2024-10-19 NOTE — PROGRESS NOTE ADULT - PROBLEM SELECTOR PLAN 1
patient with Chronic Trach at baseline   - mechanical vent: volume ctrl 18/350/5/40%  - CXR 10/7: Opacification of the right middle lobe may represent pneumonia versus atelectasis  - received Rocephin and Azithro in the ED   - sputum cx 10/8: PSA  - completed course of Cefepime ended on 10/12  - ID continues to follow

## 2024-10-19 NOTE — PROGRESS NOTE ADULT - PROBLEM SELECTOR PLAN 9
Patient has known hx of prior G-Tube stoma leak   - S/p EGD for closure of Gastric Fistula (8/12) w/ Total of 3 Mantis clips and two 22 mm Microtech clips by GI Dr Rosales   - Old stoma peg site with mild clear drainage s/p repair of fistula  - As per daughter feeds and meds all administered via j-port and g-tube remains to continuously vented for chronic gaseous distention   - 10/14 G-J exchanged by GI and clips applied to fistula site  - 10/17 PEGJ clogged  - 10/18 ok to use G port for feeds as per GI Dr. Rosales Patient has known hx of prior G-Tube stoma leak   - S/p EGD for closure of Gastric Fistula (8/12) w/ Total of 3 Mantis clips and two 22 mm Microtech clips by GI Dr Rosales   - Old stoma peg site with mild clear drainage s/p repair of fistula  - As per daughter feeds and meds all administered via j-port and g-tube remains to continuously vented for chronic gaseous distention   - 10/14 G-J exchanged by GI and clips applied to fistula site  - 10/17 PEGJ clogged  - 10/18 ok to use G port for feeds as per GI Dr. Rosales; overnight events as above. will attempt to give PO meds only however pt will need revision with Dr. Rosales on monday/tuesday.

## 2024-10-19 NOTE — PROGRESS NOTE ADULT - PROBLEM SELECTOR PLAN 10
- Na 130+ on admission; improved to 134 today  - As per daughter pt usually receives 70 cc/hr of free water flushes per hr   - Patient on sodium chloride 1 gm daily at home   - Continue sodium chloride   - Monitor BMP

## 2024-10-19 NOTE — PROGRESS NOTE ADULT - PROBLEM SELECTOR PLAN 2
[] PNA  - CXR 10/7: Opacification of the right middle lobe may represent pneumonia versus atelectasis  - sputum culture 10/8 PSA  - completed course of cefepime on 10/12    [] c. diff  - vanco IV started 10/17    [] persistent fevers  - meropenem started since 10/15  - CT A/P pending results [] PNA  - CXR 10/7: Opacification of the right middle lobe may represent pneumonia versus atelectasis  - sputum culture 10/8 PSA  - completed course of cefepime on 10/12    [] c. diff  - vanco IV started 10/17, adjust by trough    [] persistent fevers  - meropenem started since 10/15  - CT A/P with ?multifocal pneumonia, presence of gastrocutaneous fistula. otherwise no collections/abscess noted

## 2024-10-19 NOTE — PROVIDER CONTACT NOTE (OTHER) - ASSESSMENT
med/feeding port remain clogged, suction port flushes. Daughter states she was told the suction port is to be used for feeding admin

## 2024-10-19 NOTE — CONSULT NOTE ADULT - ATTENDING COMMENTS
73 yo F PMH T2DM on insulin, HTN, HLD, hypothyroidism, RA on Prednisone, Fibromyalgia, cerebral aneurysm s/p repair, chronic hypercapnic respiratory failure s/p trach (vent dependent) and chronic PEG 2022, recurrent C. diff infection (follows with Dr. Newman), persistent GJ tube leaks now s/p GC fistula repair 8/12. Currently in RCU, MICU consulted for hypotension. Of note GJ currently not working so she has not received her meds. Pt still at her baseline mentation.      #hypotension  - has not gotten her oral meds including pred and midodrine   - Currently being given 1L bolus with response     - Additional IV access placed by MICU team at bedside  -convert po meds to IV where able   - convert po pred to IV   -consider albumin if BP drops again  -consider reaching out to see if G port can be used for meds only until replacement     Pt does not require MICU level of care at this time.

## 2024-10-19 NOTE — CONSULT NOTE ADULT - ASSESSMENT
75 yo F PMH T2DM on insulin, HTN, HLD, hypothyroidism, RA on Prednisone, Fibromyalgia, cerebral aneurysm s/p repair, chronic hypercapnic respiratory failure s/p trach (vent dependent) and chronic PEG 2022, recurrent C. diff infection (follows with Dr. Newman), persistent GJ tube leaks now s/p GC fistula repair 8/12 BIBEMS with daughter for respiratory distress, hypoxia to high 80s ccb hematochezia. MICU consulted for hypotension.     #hypotension  - Pt currently on midodrine 10 mg Q8H but unable to take iso clogged PEG tube  - Pt w/ limited IV access   - Currently being given 1L bolus  - Has not been given any PO meds iso clogged PEG tube  - Additional IV access placed by MICU team at bedside    Recommendations:  - Convert prednisone to IV hydrocortisone iso pt unable to take PO meds by PEG  - Continue bolus. Pt responding appropriately to bolus w/ SBP 110s  - Consider giving 5% albumin for additional resuscitation      FINAL RECOMMENDATIONS PENDING ATTENDING ATTESTATION  75 yo F PMH T2DM on insulin, HTN, HLD, hypothyroidism, RA on Prednisone, Fibromyalgia, cerebral aneurysm s/p repair, chronic hypercapnic respiratory failure s/p trach (vent dependent) and chronic PEG 2022, recurrent C. diff infection (follows with Dr. Newman), persistent GJ tube leaks now s/p GC fistula repair 8/12 BIBEMS with daughter for respiratory distress, hypoxia to high 80s ccb hematochezia. MICU consulted for hypotension.     #hypotension  - Pt currently on midodrine 10 mg Q8H but unable to take iso clogged PEG tube  - Pt w/ limited IV access   - Currently being given 1L bolus  - Has not been given any PO meds iso clogged PEG tube  - Additional IV access placed by MICU team at bedside    Recommendations:  - Convert prednisone to IV hydrocortisone iso pt unable to take PO meds by PEG  - Continue bolus. Pt responding appropriately to bolus w/ SBP 110s  - Consider giving 5% albumin for additional resuscitation    Pt does not require ICU level of care at this time.     FINAL RECOMMENDATIONS PENDING ATTENDING ATTESTATION

## 2024-10-20 NOTE — PROGRESS NOTE ADULT - ASSESSMENT
73 yo F PMH T2DM on insulin, HTN, HLD, hypothyroidism, RA on Prednisone, Fibromyalgia, cerebral aneurysm s/p repair, chronic hypercapnic respiratory failure s/p trach (vent dependent) and chronic PEG 2022, recurrent C. diff infection (follows with Dr. 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  Pt also noted to have rectal bleeding this past week requiring transfusion 5 days ago in ED as per daughter. Daughter also reports brownish discharge from G-J tube. In ED, pt afebrile, fiO2 96-98% on 40% on ventilator.  Chest X-ray w/ opacification of right lung concerning for possible PNA.  Admitted to RCU for further management. Sputum cxs grew PSA; treated with course of Cefepime. Patient with decreased H+H received 1 unit of PRBCS on 10/10.  10/14 EGD w/ Dr. Rosales for PEGJ exchange and clippings placed for closure of fistula site.  Persistent fevers treating with meropenem and vanc.  Awaiting CT A/P to r/o sources of infection.  Continue airway management with duonebs, chest PT and suction PRN.        10/20:  Still unclear of infectious source.  CT A/P w/ contrast performed.  Continuing meropenem and DC'd vanc for now.  PEGJ remains clogged and unable to use - planned for Monday evening, remains NPO. Unable to administer Viokace via J tube today d/t resistance. Will attempt PO midodrine via G/suction port which has been OK'd for use as per Dr. Rosales.  Pt appears to be volume overloaded, gave Lasix 20mg IVPx1, continue decrease Lantus dose and administer Midodrine; Discussed plan with  at bedside.

## 2024-10-20 NOTE — PROGRESS NOTE ADULT - NUTRITIONAL ASSESSMENT
In summary, 75 yo female with numerous medical issues with vent dept related to respiratory failure, PEG-J extensions, DM II, HTN, HLD, hypothyroidism, RA on Prednisone, Fibromyalgia, cerebral aneurysm s/p repair, recurrent C. diff infection, with admission of hypoxia, rectal bleeding now s/p replacement of PEG-J and repeat closure of GC fistula who is now febrile with leukocytosis.     Obstructed J tube as well - likely secondary to crushed medications use through the tube.    CT scan with GJ tube in place.      Unable to tolerate G tube feeding    Plan:    Will try to place a J tube tomorrow (will likely be late - near 6 pm)    Anil Rosales MD

## 2024-10-20 NOTE — PROGRESS NOTE ADULT - PROBLEM SELECTOR PLAN 2
[] PNA  - CXR 10/7: Opacification of the right middle lobe may represent pneumonia versus atelectasis  - sputum culture 10/8 PSA  - completed course of cefepime on 10/12    [] c. diff  - vanco IV started 10/17, adjust by trough    [] persistent fevers  - meropenem started since 10/15  - CT A/P with ?multifocal pneumonia, presence of gastrocutaneous fistula. otherwise no collections/abscess noted

## 2024-10-20 NOTE — PROGRESS NOTE ADULT - PROBLEM SELECTOR PLAN 6
- Continue home prednisone 5 mg daily; Hydrocortisone 20mg daily while NPO  - Pt receives Enbrel injections q Thursday (will hold in setting of infection)

## 2024-10-20 NOTE — PROGRESS NOTE ADULT - NS ATTEND AMEND GEN_ALL_CORE FT
Pt is a 74F with MHx IDDM2, HTN, HLD, hypothyroidism, RA on Prednisone, fibromyalgia, cerebral aneurysm s/p repair, chronic hypoxemia and hypercapnic respiratory failure, tracheostomy and vent dependent, recurrent C. diff infection, chronic PEG 2022 with persistent GJ tube leaks now s/p GC fistula repair 8/12, recent presentation 5 days PTA for rectal bleeding requiring transfusion in the ED who now presents with acute hypoxemic respiratory distress likely 2/2 RML PNA admitted to the RCU for further management.     Over last 48 hours patient has had relative hypotension requiring IVF resuscitation. She has had a rising procalcitonin which is now > 100. Blood cultures are thus far negative. Her clinical status is very tenuous. In addition her J-tube has clogged. She is pending a CT abdomen/pelvis which will be done this afternoon as more IV access had to be placed.      #Neuro - awake, alert, and interactive by mouthing words  - Was evaluated for hearing loss but unable to cooperate with audiogram - follow-up as outpatient  - History of anxiety and tardive which per daughter was treated and improved with Seroquel and Lexapro but now family noting some of these symptoms (clicking noises and unusual movements again). We discussed medication titration but this may also impact respiratory status  - Continue pain medications as able based on BP  #Cardiovascular - IVF resuscitation as needed and Midodrine if able to have enteral access  - Patient is edematous but will not diurese at this time given worsening clinical status  #Pulmonary - chronic hypoxemic and hypercapnic respiratory failure on mechanical ventilation  - Has been vent dependent but daughter note that in March 2023 she was able to come off the vent for some time at [facility]. We discussed that this may not be possible but we will continue PSV trials as able and if SAFE.   - Has recently had some blood tinged secretions likely due to deep suctioning - will monitor output and minimize suctioning  - Sputum cultures with Pseudomonas in past - repeat sputum culture in lab.  - Continue bronchodilators and airway clearance. Continue trach care.   - Maintain O2 sat > 90%  - Continue to require mechanical ventilation   #Gastro - oropharyngeal tube with GJ tube which was replaced by GI on 10/14. 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 Monitor for drainage  - Patient noted to have anemia of unclear etiology and did receive PRBC during this admission with appropriate response. Has had prior melena which daughter felt was due to hemorrhoids. Follow-up GI.  - Continue PPI  - Daughter has noted bloating and the G tube has been vented at times. Patient would benefit from having a venting system at home for G-tube as she does require G-tube venting to prevent abdominal distension  - EC fistula with leaking of TFs when attempted through G-tube, hold feeds, can use for meds  - Dr. Vázquez to perform G-J extension late 10/21  #Nephro - normal renal function  - volume up, Lasix  #Infectious Disease - completed course of antibiotics for bacterial pneumonia (RML) previously  - CT c/a/p without any abscess/fluid collection  - Continue Meropenem, stop Vanc  - Sacral wound > 2 years. Wound care follow-up  #Hem/Onc - monitor counts and transfuse as needed  - DVT proph with SCDs given recent bleeding and PRBC transfusion  #Endo - DM2 with hyperglycemia - continue insulin and adjust as needed for goal FS < 200  - Endocrine follow-up  - TFs on hold until G-J extension can be performed  - Adjust Lantus/Admelog as approp  #Rheum - RA with chronic pain - resume Prednisone (instead of HC) and pain management.  - Would hold off Enbrel in setting of fevers      Long term prognosis guarded. Discussed with patient's  and aide at bedside today. RCU team has been in contact with patient's daughter, Marysol, as well.

## 2024-10-20 NOTE — PROGRESS NOTE ADULT - SUBJECTIVE AND OBJECTIVE BOX
Patient is a 74y old  Female who presents with a chief complaint of Patient admitted with Hypoxemia and episode of Bright red blood per rectum (20 Oct 2024 12:46)    HPI:  75 yo F PMH T2DM on insulin, HTN, HLD, hypothyroidism, RA on Prednisone, Fibromyalgia, cerebral aneurysm s/p repair, chronic hypercapnic respiratory failure s/p trach (vent dependent) and chronic PEG 2022, recurrent C. diff infection (follows with Dr. Newman), persistent GJ tube leaks now s/p GC fistula repair 8/12 BIBEMS with daughter for respiratory distress, hypoxia to high 80s.  Pt also noted to have rectal bleeding this past week requiring transfusion 5 days ago in ED as per daughter.  In ED, pt afebrile, fiO2 96-98% on 40% on ventilator.  Chest Xray w/ opacification of right lung concerning for possible PNA.  Admitted to RCU for further management.  (07 Oct 2024 18:58)    Interval Events:    REVIEW OF SYSTEMS:  [ ] Positive  [ ] All other systems negative  [ ] Unable to assess ROS because ________    Vital Signs Last 24 Hrs  T(C): 36.5 (10-20-24 @ 12:49), Max: 37 (10-20-24 @ 09:24)  T(F): 97.7 (10-20-24 @ 12:49), Max: 98.6 (10-20-24 @ 09:24)  HR: 94 (10-20-24 @ 12:49) (69 - 100)  BP: 103/60 (10-20-24 @ 12:49) (102/58 - 178/82)  RR: 31 (10-20-24 @ 09:24) (18 - 31)  SpO2: 100% (10-20-24 @ 12:49) (98% - 100%)    PHYSICAL EXAM:  HEENT:   [ ]Tracheostomy:  [ ]Pupils equal  [ ]No oral lesions  [ ]Abnormal    SKIN  [ ] No Rash  [ ] Abnormal  [ ] pressure    CARDIAC  [ ]Regular  [ ]Abnormal    PULMONARY  [ ]Bilateral Clear Breath Sounds  [ ]Normal Excursion  [ ]Abnormal    GI  [ ]PEG      [ ] +BS		              [ ]Soft, nondistended, nontender	  [ ]Abnormal    MUSCULOSKELETAL                                   [ ]Bedbound                 [ ]Abnormal    [ ]Ambulatory/OOB to chair                           EXTREMITIES                                         [ ]Normal  [ ]Edema                           NEUROLOGIC  [ ] Normal, non focal  [ ] Focal findings:    PSYCHIATRIC  [ ]Alert and appropriate  [ ] Sedated	 [ ]Agitated    :  Mcbride: [ ] Yes, if yes: Date of Placement:                   [  ] No    LINES: Central Lines [ ] Yes, if yes: Date of Placement                                     [  ] No    HOSPITAL MEDICATIONS:  MEDICATIONS  (STANDING):  albuterol/ipratropium for Nebulization 3 milliLiter(s) Nebulizer every 6 hours  artificial  tears Solution 1 Drop(s) Both EYES every 6 hours  ascorbic acid 500 milliGRAM(s) Oral daily  Biotene Dry Mouth Oral Rinse 5 milliLiter(s) Swish and Spit every 6 hours  calcium carbonate 1250 mG  + Vitamin D (OsCal 500 + D) 1 Tablet(s) Oral <User Schedule>  chlorhexidine 0.12% Liquid 15 milliLiter(s) Oral Mucosa every 12 hours  chlorhexidine 4% Liquid 1 Application(s) Topical daily  dextrose 5%. 1000 milliLiter(s) (50 mL/Hr) IV Continuous <Continuous>  dextrose 5%. 1000 milliLiter(s) (100 mL/Hr) IV Continuous <Continuous>  dextrose 50% Injectable 25 Gram(s) IV Push once  dextrose 50% Injectable 12.5 Gram(s) IV Push once  diclofenac sodium 1% Gel 2 Gram(s) Topical every 6 hours  diphenhydrAMINE Injectable 12.5 milliGRAM(s) IV Push <User Schedule>  escitalopram 10 milliGRAM(s) Oral <User Schedule>  fentaNYL   Patch  12 MICROgram(s)/Hr 1 Patch Transdermal every 72 hours  fentaNYL   Patch  25 MICROgram(s)/Hr 1 Patch Transdermal every 72 hours  gabapentin Solution 250 milliGRAM(s) Oral <User Schedule>  glucagon  Injectable 1 milliGRAM(s) IntraMuscular once  hemorrhoidal Ointment 1 Application(s) Rectal at bedtime  hydrocortisone sodium succinate Injectable 20 milliGRAM(s) IV Push <User Schedule>  influenza  Vaccine (HIGH DOSE) 0.5 milliLiter(s) IntraMuscular once  insulin glargine Injectable (LANTUS) 15 Unit(s) SubCutaneous at bedtime  insulin lispro (ADMELOG) corrective regimen sliding scale   SubCutaneous every 6 hours  lactobacillus acidophilus 1 Tablet(s) Oral <User Schedule>  levothyroxine 125 MICROGram(s) Oral daily  lidocaine   4% Patch 1 Patch Transdermal every 24 hours  melatonin Liquid 12 milliGRAM(s) Oral <User Schedule>  meropenem  IVPB 1000 milliGRAM(s) IV Intermittent every 8 hours  midodrine 10 milliGRAM(s) Oral every 8 hours  nystatin Powder 1 Application(s) Topical every 8 hours  pantoprazole  Injectable 40 milliGRAM(s) IV Push every 12 hours  prednisoLONE acetate 1% Suspension 1 Drop(s) Both EYES every 6 hours  QUEtiapine 12.5 milliGRAM(s) Oral <User Schedule>  simethicone 160 milliGRAM(s) Chew <User Schedule>  sodium chloride 1 Gram(s) Oral every 12 hours  sodium chloride 0.65% Nasal 1 Spray(s) Both Nostrils every 6 hours  witch hazel Pads 1 Application(s) Topical every 12 hours    MEDICATIONS  (PRN):  oxyCODONE    IR 2.5 milliGRAM(s) Oral every 6 hours PRN Severe Pain (7 - 10)      LABS:                        8.0    5.33  )-----------( 83       ( 20 Oct 2024 07:01 )             29.3     10-20    138  |  105  |  12  ----------------------------<  118[H]  4.0   |  22  |  <0.30[L]    Ca    8.2[L]      20 Oct 2024 07:02  Phos  3.9     10-20  Mg     2.1     10-20      Venous Blood Gas:  10-19 @ 04:06  7.26/51/63/23/89.7  VBG Lactate: 3.1    Mode: AC/ CMV (Assist Control/ Continuous Mandatory Ventilation)  RR (machine): 18  TV (machine): 350  FiO2: 40  PEEP: 5  ITime: 1  MAP: 12  PIP: 30

## 2024-10-20 NOTE — PROGRESS NOTE ADULT - PROBLEM SELECTOR PLAN 9
Patient has known hx of prior G-Tube stoma leak   - S/p EGD for closure of Gastric Fistula (8/12) w/ Total of 3 Mantis clips and two 22 mm Microtech clips by GI Dr Rosales   - Old stoma peg site with mild clear drainage s/p repair of fistula  - As per daughter feeds and meds all administered via j-port and g-tube remains to continuously vented for chronic gaseous distention   - 10/14 G-J exchanged by GI and clips applied to fistula site  - 10/17 PEGJ clogged  - 10/18 ok to use G port for feeds as per GI Dr. Rosales; overnight events as above. will attempt to give PO meds only however pt will need revision with Dr. Rosales on monday/tuesday.

## 2024-10-20 NOTE — PROGRESS NOTE ADULT - SUBJECTIVE AND OBJECTIVE BOX
INTERVAL HPI/OVERNIGHT EVENTS:      Noted emesis and G tube feeding is placed on hold.          MEDICATIONS  (STANDING):  albuterol/ipratropium for Nebulization 3 milliLiter(s) Nebulizer every 6 hours  artificial  tears Solution 1 Drop(s) Both EYES every 6 hours  ascorbic acid 500 milliGRAM(s) Oral daily  Biotene Dry Mouth Oral Rinse 5 milliLiter(s) Swish and Spit every 6 hours  calcium carbonate 1250 mG  + Vitamin D (OsCal 500 + D) 1 Tablet(s) Oral <User Schedule>  chlorhexidine 0.12% Liquid 15 milliLiter(s) Oral Mucosa every 12 hours  chlorhexidine 4% Liquid 1 Application(s) Topical daily  dextrose 5%. 1000 milliLiter(s) (50 mL/Hr) IV Continuous <Continuous>  dextrose 5%. 1000 milliLiter(s) (100 mL/Hr) IV Continuous <Continuous>  dextrose 50% Injectable 25 Gram(s) IV Push once  dextrose 50% Injectable 12.5 Gram(s) IV Push once  diclofenac sodium 1% Gel 2 Gram(s) Topical every 6 hours  diphenhydrAMINE Injectable 12.5 milliGRAM(s) IV Push <User Schedule>  escitalopram 10 milliGRAM(s) Oral <User Schedule>  fentaNYL   Patch  12 MICROgram(s)/Hr 1 Patch Transdermal every 72 hours  fentaNYL   Patch  25 MICROgram(s)/Hr 1 Patch Transdermal every 72 hours  gabapentin Solution 250 milliGRAM(s) Oral <User Schedule>  glucagon  Injectable 1 milliGRAM(s) IntraMuscular once  hemorrhoidal Ointment 1 Application(s) Rectal at bedtime  hydrocortisone sodium succinate Injectable 20 milliGRAM(s) IV Push <User Schedule>  influenza  Vaccine (HIGH DOSE) 0.5 milliLiter(s) IntraMuscular once  insulin glargine Injectable (LANTUS) 15 Unit(s) SubCutaneous at bedtime  insulin lispro (ADMELOG) corrective regimen sliding scale   SubCutaneous every 6 hours  lactobacillus acidophilus 1 Tablet(s) Oral <User Schedule>  levothyroxine 125 MICROGram(s) Oral daily  lidocaine   4% Patch 1 Patch Transdermal every 24 hours  melatonin Liquid 12 milliGRAM(s) Oral <User Schedule>  meropenem  IVPB 1000 milliGRAM(s) IV Intermittent every 8 hours  midodrine 10 milliGRAM(s) Oral every 8 hours  nystatin Powder 1 Application(s) Topical every 8 hours  pantoprazole  Injectable 40 milliGRAM(s) IV Push every 12 hours  prednisoLONE acetate 1% Suspension 1 Drop(s) Both EYES every 6 hours  QUEtiapine 12.5 milliGRAM(s) Oral <User Schedule>  simethicone 160 milliGRAM(s) Chew <User Schedule>  sodium chloride 1 Gram(s) Oral every 12 hours  sodium chloride 0.65% Nasal 1 Spray(s) Both Nostrils every 6 hours  witch hazel Pads 1 Application(s) Topical every 12 hours    MEDICATIONS  (PRN):  oxyCODONE    IR 2.5 milliGRAM(s) Oral every 6 hours PRN Severe Pain (7 - 10)      Allergies    walnut (Unknown)  metronidazole (Rash)  Lyrica (Unknown)  penicillin (Unknown)  Pineapple (Unknown)  Tagamet (Unknown)  heparin (Unknown)  Pecans (Unknown)  Hazelnut (Unknown)  meropenem (Rash)    Intolerances        Review of Systems:    Vital Signs Last 24 Hrs  T(C): 37 (20 Oct 2024 09:24), Max: 37 (20 Oct 2024 09:24)  T(F): 98.6 (20 Oct 2024 09:24), Max: 98.6 (20 Oct 2024 09:24)  HR: 69 (20 Oct 2024 11:12) (69 - 100)  BP: 144/64 (20 Oct 2024 10:14) (102/58 - 178/82)  BP(mean): --  RR: 31 (20 Oct 2024 09:24) (18 - 31)  SpO2: 98% (20 Oct 2024 11:12) (98% - 100%)    Parameters below as of 20 Oct 2024 09:24  Patient On (Oxygen Delivery Method): ventilator        PHYSICAL EXAM:  Constitutional: NAD  Neck: No LAD, supple, trach in place  Respiratory: CTAB  Cardiovascular: S1 and S2, RRR,   Gastrointestinal: BS+, GJ tube in place.  Soft.        LABS:                        8.0    5.33  )-----------( 83       ( 20 Oct 2024 07:01 )             29.3     10-20    138  |  105  |  12  ----------------------------<  118[H]  4.0   |  22  |  <0.30[L]    Ca    8.2[L]      20 Oct 2024 07:02  Phos  3.9     10-20  Mg     2.1     10-20        Urinalysis Basic - ( 20 Oct 2024 07:02 )    Color: x / Appearance: x / SG: x / pH: x  Gluc: 118 mg/dL / Ketone: x  / Bili: x / Urobili: x   Blood: x / Protein: x / Nitrite: x   Leuk Esterase: x / RBC: x / WBC x   Sq Epi: x / Non Sq Epi: x / Bacteria: x          RADIOLOGY & ADDITIONAL TESTS:

## 2024-10-21 NOTE — PROGRESS NOTE ADULT - ASSESSMENT
Assessment and Plan:   · Assessment    A/P:Hx of 75 yo F PMH T2DM on insulin, HTN, HLD, hypothyroidism, RA on Prednisone, Fibromyalgia, cerebral aneurysm s/p repair, acute hypercapnic respiratory failure s/p trach (vent dependent) and PEG (10/22), bedbound presented from home with complaints of possible G tube dislodgement and redness around the site.  Site is red with small amount of pus at insertion site.  Tender to palpation, warm to touch. CT Abdomen/Pelvis done showing dislodgement of G tube with balloon in the anterior abdominal wall with air filled track to stomach.  GI contacted, but unable to replace at bedside given placement of balloon. Surgery consulted--recommend gastrograffin study to eval placement of PEG and possible IR replacement in AM. Given Vancomycin 1 gm IV x 1 due to concern for possible cellulitis.  Of note, patient hemodynamically stable, afebrile, without leukocytosis on presentation.     Wound Consult requested to assist w/ management of  Sacral/bilateral Buttocks chronic stage 4 pressure injury now resolved  Left ischium chronic stage 4 pressure injury now resolved  Incontinence Dermatitis    -   Continue turning and positioning w/ offloading assistive devices as per protocol  -   Buttocks/ Sacrum / RADHA BID /cavilon and prn soiling   -   Continue w/ attends under pads and Pericare as per protocol  -   Continue w/ low air loss pressure redistribution bed surface   Moisturize intact skin w/ SWEEN cream BID  Nutrition Consult for optimization in pt w/ Increased nutritional needs  Pt will need Group 2 mattress on hospital bed and ROHO cushion for wheel chair upon discharge home  Care as per medicine, will follow w/ you  Upon discharge f/u as outpatient at [facility] [address] [place] [address] [phone]  Daughter educated through teachback method care of patients skin and prevention of further injury  Seen &  D/w team & RN  Thank you for this consult  Gabby Jay [facility], [facility]   [phone]  Nights/ Weekends/ Holidays please call:  General Surgery Consult pager ([phone]) for emergencies  Wound PT for multilayer leg wrapping or VAC issues ([phone])

## 2024-10-21 NOTE — PROGRESS NOTE ADULT - PROBLEM SELECTOR PLAN 5
LDL goal <70 due to DM  Pt LDL NA  Order fasting lipid if not recently done  Statin as per primary team. Not getting statin at this time   Manage per primary team   F/u levels as out pt

## 2024-10-21 NOTE — PROGRESS NOTE ADULT - SUBJECTIVE AND OBJECTIVE BOX
Health system-- WOUND TEAM -- FOLLOW UP NOTE  --------------------------------------------------------------------------------    24 hour events/subjective:    HHA at bedside   Daughter on telephone  Afebrile  Last temp 36.7 C  Tolerating TF w/o n/v  Chart reviewed  Hx of    75 yo F PMH T2DM on insulin, HTN, HLD, hypothyroidism, RA on Prednisone, Fibromyalgia, cerebral aneurysm s/p repair  chronic hypercapnic respiratory failure s/p trach (vent dependent) and chronic PEG 2022, recurrent C. diff infection  dc planning ongoing    Diet, NPO (10-21-24 @ 10:35) [Active]      ROS:   pt unable to participate    ALLERGIES & MEDICATIONS  --------------------------------------------------------------------------------  Allergies    walnut (Unknown)  metronidazole (Rash)  Lyrica (Unknown)  penicillin (Unknown)  Pineapple (Unknown)  Tagamet (Unknown)  heparin (Unknown)  Pecans (Unknown)  Hazelnut (Unknown)  meropenem (Rash)    Intolerances          STANDING INPATIENT MEDICATIONS    albuterol/ipratropium for Nebulization 3 milliLiter(s) Nebulizer every 6 hours  artificial  tears Solution 1 Drop(s) Both EYES every 6 hours  ascorbic acid 500 milliGRAM(s) Oral daily  Biotene Dry Mouth Oral Rinse 5 milliLiter(s) Swish and Spit every 6 hours  calcium carbonate 1250 mG  + Vitamin D (OsCal 500 + D) 1 Tablet(s) Oral <User Schedule>  chlorhexidine 0.12% Liquid 15 milliLiter(s) Oral Mucosa every 12 hours  chlorhexidine 4% Liquid 1 Application(s) Topical daily  dextrose 5% + lactated ringers. 1000 milliLiter(s) IV Continuous <Continuous>  dextrose 5%. 1000 milliLiter(s) IV Continuous <Continuous>  dextrose 5%. 1000 milliLiter(s) IV Continuous <Continuous>  dextrose 50% Injectable 25 Gram(s) IV Push once  dextrose 50% Injectable 12.5 Gram(s) IV Push once  diclofenac sodium 1% Gel 2 Gram(s) Topical every 6 hours  diphenhydrAMINE Injectable 12.5 milliGRAM(s) IV Push <User Schedule>  escitalopram 10 milliGRAM(s) Oral <User Schedule>  fentaNYL   Patch  12 MICROgram(s)/Hr 1 Patch Transdermal every 72 hours  fentaNYL   Patch  25 MICROgram(s)/Hr 1 Patch Transdermal every 72 hours  gabapentin Solution 250 milliGRAM(s) Oral <User Schedule>  glucagon  Injectable 1 milliGRAM(s) IntraMuscular once  hemorrhoidal Ointment 1 Application(s) Rectal at bedtime  hydrocortisone sodium succinate Injectable 20 milliGRAM(s) IV Push <User Schedule>  influenza  Vaccine (HIGH DOSE) 0.5 milliLiter(s) IntraMuscular once  insulin glargine Injectable (LANTUS) 15 Unit(s) SubCutaneous at bedtime  insulin lispro (ADMELOG) corrective regimen sliding scale   SubCutaneous every 6 hours  lactobacillus acidophilus 1 Tablet(s) Oral <User Schedule>  levothyroxine 125 MICROGram(s) Oral daily  lidocaine   4% Patch 1 Patch Transdermal every 24 hours  melatonin Liquid 12 milliGRAM(s) Oral <User Schedule>  meropenem  IVPB 1000 milliGRAM(s) IV Intermittent every 8 hours  midodrine 10 milliGRAM(s) Oral every 8 hours  nystatin Powder 1 Application(s) Topical every 8 hours  pantoprazole  Injectable 40 milliGRAM(s) IV Push every 12 hours  prednisoLONE acetate 1% Suspension 1 Drop(s) Both EYES every 6 hours  QUEtiapine 12.5 milliGRAM(s) Oral <User Schedule>  simethicone 160 milliGRAM(s) Chew <User Schedule>  sodium chloride 1 Gram(s) Oral every 12 hours  sodium chloride 0.65% Nasal 1 Spray(s) Both Nostrils every 6 hours  witch hazel Pads 1 Application(s) Topical every 12 hours      PRN INPATIENT MEDICATION  oxyCODONE    IR 2.5 milliGRAM(s) Oral every 6 hours PRN        VITALS/PHYSICAL EXAM  --------------------------------------------------------------------------------  T(C): 36.7 (10-21-24 @ 11:42), Max: 36.7 (10-20-24 @ 19:24)  HR: 82 (10-21-24 @ 11:42) (71 - 106)  BP: 153/62 (10-21-24 @ 11:42) (103/60 - 176/77)  RR: 18 (10-21-24 @ 11:42) (18 - 22)  SpO2: 100% (10-21-24 @ 11:42) (92% - 100%)  Wt(kg): --        10-20-24 @ 07:01  -  10-21-24 @ 07:00  --------------------------------------------------------  IN: 120 mL / OUT: 350 mL / NET: -230 mL    HEENT: NC/AT, sclera clear, mucosa moist, throat clear, trachea midline, neck supple  Respiratory: Trach  Gastrointestinal: soft NT/ND. (+) fistula moderate drainage  : (+) purewick  Neurology:  nonverbal, can not follow commands  Psych:  difficult to assess  Musculoskeletal:  limited stiff / (+) foot drop  Vascular: BLLE equally warm,  no cyanosis, clubbing, edema nor acute ischemia     BLLE DP pulses palpable  Skin:  moist w/ good turgor  Skin:  Sacral/bilateral buttocks with contracted, scarred tissue in and around the gluteal cleft superficial denuded skin         Left ischium with contracted, scarred tissue very close to the anus,          No odor, mild erythema, increased warmth, tenderness, induration, fluctuance          LABS/ CULTURES/ RADIOLOGY:                     7.8    4.44  >-----------<  111      [10-21-24 @ 06:43]              26.6     139  |  103  |  10  ----------------------------<  166      [10-21-24 @ 06:43]  3.2   |  21  |  <0.30        Ca     8.3     [10-21-24 @ 06:43]      Mg     1.6     [10-21-24 @ 06:43]      Phos  3.4     [10-21-24 @ 06:43]                CAPILLARY BLOOD GLUCOSE      POCT Blood Glucose.: 206 mg/dL (21 Oct 2024 11:20)  POCT Blood Glucose.: 172 mg/dL (21 Oct 2024 06:03)  POCT Blood Glucose.: 163 mg/dL (21 Oct 2024 00:06)  POCT Blood Glucose.: 182 mg/dL (20 Oct 2024 18:44)  POCT Blood Glucose.: 160 mg/dL (20 Oct 2024 12:07)      Culture - Blood (collected 10-17-24 @ 11:58)  Source: .Blood BLOOD  Preliminary Report (10-20-24 @ 18:01):    No growth at 72 Hours    Culture - Blood (collected 10-17-24 @ 11:55)  Source: .Blood BLOOD  Preliminary Report (10-20-24 @ 18:01):    No growth at 72 Hours    Culture - Blood (collected 10-15-24 @ 18:53)  Source: .Blood BLOOD  Final Report (10-20-24 @ 23:00):    No growth at 5 days          A1C with Estimated Average Glucose Result: 5.8 % (10-14-24 @ 05:08)  A1C with Estimated Average Glucose Result: 6.4 % (08-12-24 @ 07:32)      >>> <<<

## 2024-10-21 NOTE — PROGRESS NOTE ADULT - NS ATTEND AMEND GEN_ALL_CORE FT
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 Found to have Pseudomonas aeruginosa in sputum culture and urine culture with ESBL E. coli. Course complicated by hypotension requiring IVF hydration with improvement.    # Neuro: awake and alert, but with critical illness polyneuropathy. Outpatient f/up for hearing loss. Continue escitalopram and quetiapine. Fentanyl patch for pain. Oxycodone prn  # CV: resolved hypotension with IVF hydration. Continue midodrine. Diuresis with furosemide 20 mg IV  # Pulm: continue lung protective ventilation. Pressure support trials as tolerates. Continue Duonebs  # GI: plan for G-J tube exchange today. Keep G port for decompression of G-C fistula and J tube for meds and feeds. Continue PPI  # Renal: stable kidney function and lytes  # ID: continue meropenem for Pseudomonas aeruginosa pneumonia and ESBL E. coli UTI. F/up ID. CT A/P unremarkable. Chest CT consistent with multifocal pneumonia. Will need repeat CT chest in 6 weeks to assess for resolution. Wound care follow-up for sacral wound  # Heme: SCD's for DVT ppx, start HSQ tomorrow if does well post-EGD  # Endo: DM2, continue insulin coverage scale + Lantus 15 qhs. Continue levothyroxine. Continue hydrocortisone given chronic steroids with recent hypotension, now improved  # Rheum: RA, if remains HD stable will change hydrocortisone back to home prednisone 5 mg daily. Hold off on Enbrel at this time  # Dispo: full code, prognosis guarded, discussed with patient and daughter Marysol by phone

## 2024-10-21 NOTE — PROGRESS NOTE ADULT - SUBJECTIVE AND OBJECTIVE BOX
Patient is a 74y old  Female who presents with a chief complaint of Patient admitted with Hypoxemia and episode of Bright red blood per rectum (20 Oct 2024 13:51)      Interval Events:    REVIEW OF SYSTEMS:  [ ] Positive  [ ] All other systems negative  [ ] Unable to assess ROS because ________    Vital Signs Last 24 Hrs  T(C): 36.7 (10-21-24 @ 06:00), Max: 37 (10-20-24 @ 09:24)  T(F): 98 (10-21-24 @ 06:00), Max: 98.6 (10-20-24 @ 09:24)  HR: 79 (10-21-24 @ 06:00) (69 - 106)  BP: 176/77 (10-21-24 @ 06:00) (103/60 - 178/82)  RR: 20 (10-21-24 @ 00:00) (20 - 31)  SpO2: 100% (10-21-24 @ 06:00) (92% - 100%)    PHYSICAL EXAM:  HEENT:   [ ]Tracheostomy:  [ ]Pupils equal  [ ]No oral lesions  [ ]Abnormal    SKIN  [ ]No Rash  [ ] Abnormal  [ ] pressure    CARDIAC  [ ]Regular  [ ]Abnormal    PULMONARY  [ ]Bilateral Clear Breath Sounds  [ ]Normal Excursion  [ ]Abnormal    GI  [ ]PEG      [ ] +BS		              [ ]Soft, nondistended, nontender	  [ ]Abnormal    MUSCULOSKELETAL                                   [ ]Bedbound                 [ ]Abnormal    [ ]Ambulatory/OOB to chair                           EXTREMITIES                                         [ ]Normal  [ ]Edema                           NEUROLOGIC  [ ] Normal, non focal  [ ] Focal findings:    PSYCHIATRIC  [ ]Alert and appropriate  [ ] Sedated	 [ ]Agitated    :  Mcbride: [ ] Yes, if yes: Date of Placement:                   [  ] No    LINES: Central Lines [ ] Yes, if yes: Date of Placement                                     [  ] No    HOSPITAL MEDICATIONS:  MEDICATIONS  (STANDING):  albuterol/ipratropium for Nebulization 3 milliLiter(s) Nebulizer every 6 hours  artificial  tears Solution 1 Drop(s) Both EYES every 6 hours  ascorbic acid 500 milliGRAM(s) Oral daily  Biotene Dry Mouth Oral Rinse 5 milliLiter(s) Swish and Spit every 6 hours  calcium carbonate 1250 mG  + Vitamin D (OsCal 500 + D) 1 Tablet(s) Oral <User Schedule>  chlorhexidine 0.12% Liquid 15 milliLiter(s) Oral Mucosa every 12 hours  chlorhexidine 4% Liquid 1 Application(s) Topical daily  dextrose 5%. 1000 milliLiter(s) (100 mL/Hr) IV Continuous <Continuous>  dextrose 5%. 1000 milliLiter(s) (50 mL/Hr) IV Continuous <Continuous>  dextrose 50% Injectable 25 Gram(s) IV Push once  dextrose 50% Injectable 12.5 Gram(s) IV Push once  diclofenac sodium 1% Gel 2 Gram(s) Topical every 6 hours  diphenhydrAMINE Injectable 12.5 milliGRAM(s) IV Push <User Schedule>  escitalopram 10 milliGRAM(s) Oral <User Schedule>  fentaNYL   Patch  12 MICROgram(s)/Hr 1 Patch Transdermal every 72 hours  fentaNYL   Patch  25 MICROgram(s)/Hr 1 Patch Transdermal every 72 hours  gabapentin Solution 250 milliGRAM(s) Oral <User Schedule>  glucagon  Injectable 1 milliGRAM(s) IntraMuscular once  hemorrhoidal Ointment 1 Application(s) Rectal at bedtime  hydrocortisone sodium succinate Injectable 20 milliGRAM(s) IV Push <User Schedule>  influenza  Vaccine (HIGH DOSE) 0.5 milliLiter(s) IntraMuscular once  insulin glargine Injectable (LANTUS) 15 Unit(s) SubCutaneous at bedtime  insulin lispro (ADMELOG) corrective regimen sliding scale   SubCutaneous every 6 hours  lactobacillus acidophilus 1 Tablet(s) Oral <User Schedule>  levothyroxine 125 MICROGram(s) Oral daily  lidocaine   4% Patch 1 Patch Transdermal every 24 hours  melatonin Liquid 12 milliGRAM(s) Oral <User Schedule>  meropenem  IVPB 1000 milliGRAM(s) IV Intermittent every 8 hours  midodrine 10 milliGRAM(s) Oral every 8 hours  nystatin Powder 1 Application(s) Topical every 8 hours  pantoprazole  Injectable 40 milliGRAM(s) IV Push every 12 hours  prednisoLONE acetate 1% Suspension 1 Drop(s) Both EYES every 6 hours  QUEtiapine 12.5 milliGRAM(s) Oral <User Schedule>  simethicone 160 milliGRAM(s) Chew <User Schedule>  sodium chloride 1 Gram(s) Oral every 12 hours  sodium chloride 0.65% Nasal 1 Spray(s) Both Nostrils every 6 hours  witch hazel Pads 1 Application(s) Topical every 12 hours    MEDICATIONS  (PRN):  oxyCODONE    IR 2.5 milliGRAM(s) Oral every 6 hours PRN Severe Pain (7 - 10)      LABS:                        7.8    4.44  )-----------( 111      ( 21 Oct 2024 06:43 )             26.6     10-21    139  |  103  |  10  ----------------------------<  166[H]  3.2[L]   |  21[L]  |  <0.30[L]    Ca    8.3[L]      21 Oct 2024 06:43  Phos  3.4     10-21  Mg     1.6     10-21        Urinalysis Basic - ( 21 Oct 2024 06:43 )    Color: x / Appearance: x / SG: x / pH: x  Gluc: 166 mg/dL / Ketone: x  / Bili: x / Urobili: x   Blood: x / Protein: x / Nitrite: x   Leuk Esterase: x / RBC: x / WBC x   Sq Epi: x / Non Sq Epi: x / Bacteria: x          CAPILLARY BLOOD GLUCOSE    MICROBIOLOGY:     RADIOLOGY:  [ ] Reviewed and interpreted by me    Mode: AC/ CMV (Assist Control/ Continuous Mandatory Ventilation)  RR (machine): 18  TV (machine): 350  FiO2: 30  PEEP: 5  ITime: 1  MAP: 11  PIP: 27   Patient is a 74y old  Female who presents with a chief complaint of Patient admitted with Hypoxemia and episode of Bright red blood per rectum (20 Oct 2024 13:51)    Interval Events:  No acute events overnights.     REVIEW OF SYSTEMS:  [ ] Positive  [X] All other systems negative  [ ] Unable to assess ROS because ________    Vital Signs Last 24 Hrs  T(C): 36.7 (10-21-24 @ 06:00), Max: 37 (10-20-24 @ 09:24)  T(F): 98 (10-21-24 @ 06:00), Max: 98.6 (10-20-24 @ 09:24)  HR: 79 (10-21-24 @ 06:00) (69 - 106)  BP: 176/77 (10-21-24 @ 06:00) (103/60 - 178/82)  RR: 20 (10-21-24 @ 00:00) (20 - 31)  SpO2: 100% (10-21-24 @ 06:00) (92% - 100%)    PHYSICAL EXAM:  HEENT:   [X]Tracheostomy: #8 XLT shiley  [X]Pupils equal  [ ]No oral lesions  [ ]Abnormal    SKIN  [ ]No Rash  [X] Abnormal - IAD, MAD at old PEG site  [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    CARDIAC  [X]Regular  [ ]Abnormal    PULMONARY  [X]Bilateral Clear Breath Sounds  [ ]Normal Excursion  [ ]Abnormal    GI  [X]PEGJ      [X] +BS		              [X]Soft, nondistended, nontender	  [X]Abnormal - old PEGJ site c/d/i    MUSCULOSKELETAL                                   [X]Bedbound                 [ ]Abnormal    [ ]Ambulatory/OOB to chair                           EXTREMITIES                                         [ ]Normal  [X]Edema - generalized edema                        NEUROLOGIC  [ ] Normal, non focal  [X] Focal findings: awake, alert, following commands on all 4 extremities     PSYCHIATRIC  [X] Alert and appropriate  [ ] Sedated	 [ ]Agitated    :  Mcbride: [ ] Yes, if yes: Date of Placement:                   [X] No    LINES: Central Lines [ ] Yes, if yes: Date of Placement                                     [X] No    HOSPITAL MEDICATIONS:  MEDICATIONS  (STANDING):  albuterol/ipratropium for Nebulization 3 milliLiter(s) Nebulizer every 6 hours  artificial  tears Solution 1 Drop(s) Both EYES every 6 hours  ascorbic acid 500 milliGRAM(s) Oral daily  Biotene Dry Mouth Oral Rinse 5 milliLiter(s) Swish and Spit every 6 hours  calcium carbonate 1250 mG  + Vitamin D (OsCal 500 + D) 1 Tablet(s) Oral <User Schedule>  chlorhexidine 0.12% Liquid 15 milliLiter(s) Oral Mucosa every 12 hours  chlorhexidine 4% Liquid 1 Application(s) Topical daily  dextrose 5%. 1000 milliLiter(s) (100 mL/Hr) IV Continuous <Continuous>  dextrose 5%. 1000 milliLiter(s) (50 mL/Hr) IV Continuous <Continuous>  dextrose 50% Injectable 25 Gram(s) IV Push once  dextrose 50% Injectable 12.5 Gram(s) IV Push once  diclofenac sodium 1% Gel 2 Gram(s) Topical every 6 hours  diphenhydrAMINE Injectable 12.5 milliGRAM(s) IV Push <User Schedule>  escitalopram 10 milliGRAM(s) Oral <User Schedule>  fentaNYL   Patch  12 MICROgram(s)/Hr 1 Patch Transdermal every 72 hours  fentaNYL   Patch  25 MICROgram(s)/Hr 1 Patch Transdermal every 72 hours  gabapentin Solution 250 milliGRAM(s) Oral <User Schedule>  glucagon  Injectable 1 milliGRAM(s) IntraMuscular once  hemorrhoidal Ointment 1 Application(s) Rectal at bedtime  hydrocortisone sodium succinate Injectable 20 milliGRAM(s) IV Push <User Schedule>  influenza  Vaccine (HIGH DOSE) 0.5 milliLiter(s) IntraMuscular once  insulin glargine Injectable (LANTUS) 15 Unit(s) SubCutaneous at bedtime  insulin lispro (ADMELOG) corrective regimen sliding scale   SubCutaneous every 6 hours  lactobacillus acidophilus 1 Tablet(s) Oral <User Schedule>  levothyroxine 125 MICROGram(s) Oral daily  lidocaine   4% Patch 1 Patch Transdermal every 24 hours  melatonin Liquid 12 milliGRAM(s) Oral <User Schedule>  meropenem  IVPB 1000 milliGRAM(s) IV Intermittent every 8 hours  midodrine 10 milliGRAM(s) Oral every 8 hours  nystatin Powder 1 Application(s) Topical every 8 hours  pantoprazole  Injectable 40 milliGRAM(s) IV Push every 12 hours  prednisoLONE acetate 1% Suspension 1 Drop(s) Both EYES every 6 hours  QUEtiapine 12.5 milliGRAM(s) Oral <User Schedule>  simethicone 160 milliGRAM(s) Chew <User Schedule>  sodium chloride 1 Gram(s) Oral every 12 hours  sodium chloride 0.65% Nasal 1 Spray(s) Both Nostrils every 6 hours  witch hazel Pads 1 Application(s) Topical every 12 hours    MEDICATIONS  (PRN):  oxyCODONE    IR 2.5 milliGRAM(s) Oral every 6 hours PRN Severe Pain (7 - 10)    LABS:                        7.8    4.44  )-----------( 111      ( 21 Oct 2024 06:43 )             26.6     10-21    139  |  103  |  10  ----------------------------<  166[H]  3.2[L]   |  21[L]  |  <0.30[L]    Ca    8.3[L]      21 Oct 2024 06:43  Phos  3.4     10-21  Mg     1.6     10-21    Urinalysis Basic - ( 21 Oct 2024 06:43 )    Color: x / Appearance: x / SG: x / pH: x  Gluc: 166 mg/dL / Ketone: x  / Bili: x / Urobili: x   Blood: x / Protein: x / Nitrite: x   Leuk Esterase: x / RBC: x / WBC x   Sq Epi: x / Non Sq Epi: x / Bacteria: x    CAPILLARY BLOOD GLUCOSE    MICROBIOLOGY:     RADIOLOGY:  [ ] Reviewed and interpreted by me    Mode: AC/ CMV (Assist Control/ Continuous Mandatory Ventilation)  RR (machine): 18  TV (machine): 350  FiO2: 30  PEEP: 5  ITime: 1  MAP: 11  PIP: 27

## 2024-10-21 NOTE — PRE-ANESTHESIA EVALUATION ADULT - NSANTHPEFT_GEN_ALL_CORE
Cardiac: Tachycardic; normal rate, no m/g/r  Pulm: Crackles throughout both lung fields
LUNGS DECREASED BS  COR RRR S1S2

## 2024-10-21 NOTE — PROGRESS NOTE ADULT - SUBJECTIVE AND OBJECTIVE BOX
Chief Complaint: Diabetes Mellitus follow up    INTERVAL HX:  Patient seen at bedside with her aide present.   Continues off of tube feeding due to clogged ped tube.    BG over the last 24 hrs have been mostly within goal range 100-180mg/dl. No hypoglycemia.     Review of Systems:  unable     Allergies    walnut (Unknown)  metronidazole (Rash)  Lyrica (Unknown)  penicillin (Unknown)  Pineapple (Unknown)  Tagamet (Unknown)  heparin (Unknown)  Pecans (Unknown)  Hazelnut (Unknown)  meropenem (Rash)    Intolerances      MEDICATIONS  (STANDING):  albuterol/ipratropium for Nebulization 3 milliLiter(s) Nebulizer every 6 hours  artificial  tears Solution 1 Drop(s) Both EYES every 6 hours  ascorbic acid 500 milliGRAM(s) Oral daily  Biotene Dry Mouth Oral Rinse 5 milliLiter(s) Swish and Spit every 6 hours  calcium carbonate 1250 mG  + Vitamin D (OsCal 500 + D) 1 Tablet(s) Oral <User Schedule>  chlorhexidine 0.12% Liquid 15 milliLiter(s) Oral Mucosa every 12 hours  chlorhexidine 4% Liquid 1 Application(s) Topical daily  dextrose 5% + lactated ringers. 1000 milliLiter(s) (50 mL/Hr) IV Continuous <Continuous>  dextrose 5%. 1000 milliLiter(s) (100 mL/Hr) IV Continuous <Continuous>  dextrose 5%. 1000 milliLiter(s) (50 mL/Hr) IV Continuous <Continuous>  dextrose 50% Injectable 25 Gram(s) IV Push once  dextrose 50% Injectable 12.5 Gram(s) IV Push once  diclofenac sodium 1% Gel 2 Gram(s) Topical every 6 hours  diphenhydrAMINE Injectable 12.5 milliGRAM(s) IV Push <User Schedule>  escitalopram 10 milliGRAM(s) Oral <User Schedule>  fentaNYL   Patch  12 MICROgram(s)/Hr 1 Patch Transdermal every 72 hours  fentaNYL   Patch  25 MICROgram(s)/Hr 1 Patch Transdermal every 72 hours  gabapentin Solution 250 milliGRAM(s) Oral <User Schedule>  glucagon  Injectable 1 milliGRAM(s) IntraMuscular once  hemorrhoidal Ointment 1 Application(s) Rectal at bedtime  influenza  Vaccine (HIGH DOSE) 0.5 milliLiter(s) IntraMuscular once  insulin glargine Injectable (LANTUS) 15 Unit(s) SubCutaneous at bedtime  insulin lispro (ADMELOG) corrective regimen sliding scale   SubCutaneous every 6 hours  lactobacillus acidophilus 1 Tablet(s) Oral <User Schedule>  levothyroxine 125 MICROGram(s) Oral daily  lidocaine   4% Patch 1 Patch Transdermal every 24 hours  melatonin Liquid 12 milliGRAM(s) Oral <User Schedule>  meropenem  IVPB 1000 milliGRAM(s) IV Intermittent every 8 hours  nystatin Powder 1 Application(s) Topical every 8 hours  pantoprazole  Injectable 40 milliGRAM(s) IV Push every 12 hours  prednisoLONE acetate 1% Suspension 1 Drop(s) Both EYES every 6 hours  QUEtiapine 12.5 milliGRAM(s) Oral <User Schedule>  simethicone 160 milliGRAM(s) Chew <User Schedule>  sodium chloride 1 Gram(s) Oral every 12 hours  sodium chloride 0.65% Nasal 1 Spray(s) Both Nostrils every 6 hours  witch hazel Pads 1 Application(s) Topical every 12 hours        hydrocortisone sodium succinate Injectable   20 milliGRAM(s) IV Push (10-21-24 @ 10:35)    insulin glargine Injectable (LANTUS)   15 Unit(s) SubCutaneous (10-21-24 @ 00:16)    insulin lispro (ADMELOG) corrective regimen sliding scale   2 Unit(s) SubCutaneous (10-21-24 @ 11:52)   1 Unit(s) SubCutaneous (10-21-24 @ 06:06)   1 Unit(s) SubCutaneous (10-21-24 @ 00:17)   1 Unit(s) SubCutaneous (10-20-24 @ 19:17)    levothyroxine   125 MICROGram(s) Oral (10-21-24 @ 05:47)        PHYSICAL EXAM:  VITALS: T(C): 36.7 (10-21-24 @ 11:42)  T(F): 98 (10-21-24 @ 11:42), Max: 98.1 (10-21-24 @ 00:00)  HR: 89 (10-21-24 @ 15:57) (70 - 106)  BP: 164/77 (10-21-24 @ 15:57) (153/62 - 176/77)  RR:  (18 - 20)  SpO2:  (97% - 100%)  Wt(kg): --  GENERAL: NAD  Respiratory: Respirations unlabored   Extremities: Warm, no edema  NEURO:Asleep, not participating.    LABS:  POCT Blood Glucose.: 252 mg/dL (10-21-24 @ 17:26)  POCT Blood Glucose.: 206 mg/dL (10-21-24 @ 11:20)  POCT Blood Glucose.: 172 mg/dL (10-21-24 @ 06:03)  POCT Blood Glucose.: 163 mg/dL (10-21-24 @ 00:06)  POCT Blood Glucose.: 182 mg/dL (10-20-24 @ 18:44)  POCT Blood Glucose.: 160 mg/dL (10-20-24 @ 12:07)  POCT Blood Glucose.: 115 mg/dL (10-20-24 @ 06:00)  POCT Blood Glucose.: 146 mg/dL (10-19-24 @ 23:45)  POCT Blood Glucose.: 208 mg/dL (10-19-24 @ 18:06)  POCT Blood Glucose.: 296 mg/dL (10-19-24 @ 11:37)  POCT Blood Glucose.: 291 mg/dL (10-19-24 @ 10:03)  POCT Blood Glucose.: 175 mg/dL (10-19-24 @ 05:45)  POCT Blood Glucose.: 166 mg/dL (10-19-24 @ 03:11)  POCT Blood Glucose.: 170 mg/dL (10-19-24 @ 00:59)  POCT Blood Glucose.: 179 mg/dL (10-18-24 @ 23:52)  POCT Blood Glucose.: 174 mg/dL (10-18-24 @ 22:07)                          7.8    4.44  )-----------( 111      ( 21 Oct 2024 06:43 )             26.6     10-21    139  |  103  |  10  ----------------------------<  166[H]  3.2[L]   |  21[L]  |  <0.30[L]    Ca    8.3[L]      21 Oct 2024 06:43  Phos  3.4     10-21  Mg     1.6     10-21      eGFR: 113 mL/min/1.73m2 (21 Oct 2024 06:43)      Thyroid Function Tests:  10-16 @ 06:50 TSH 1.74 FreeT4 1.2 T3 -- Anti TPO -- Anti Thyroglobulin Ab -- TSI --      A1C with Estimated Average Glucose Result: 5.8 % (10-14-24 @ 05:08)  A1C with Estimated Average Glucose Result: 6.4 % (08-12-24 @ 07:32)    Estimated Average Glucose: 120 mg/dL (10-14-24 @ 05:08)  Estimated Average Glucose: 137 mg/dL (08-12-24 @ 07:32)        Diet, NPO (10-21-24 @ 10:35) [Active]

## 2024-10-21 NOTE — PROGRESS NOTE ADULT - ASSESSMENT
73 yo F PMH T2DM on insulin, HTN, HLD, hypothyroidism, RA on Prednisone, Fibromyalgia, cerebral aneurysm s/p repair, chronic hypercapnic respiratory failure s/p trach (vent dependent) and chronic PEG 2022, recurrent C. diff infection (follows with Dr. 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 s/p antibiotics and s/p GJ tube exchanges on 10/14     Endocrine consulted for Type 2 DM management while on tube feeding. Off TFs due to clogged peg tube. Will remain NPO for now. Pt on D5 infusion @ 50cc/hr with BG at goal while on basal and correction insulin. BG Goal 100-180mg/dl. On chronic prednisone 5 mg daily.     - Home DM medications: Lantus 35 units at HS, Humalog 26 units with # 1 feeding, 22 units with #2 tube feeding, 20 units with #3 tube feeding and  Humalog correction scale (  2 units for -932, 4 units for -250, 6 units for -300, 8units for -350, 10 units for -400, 12 units for -450)   while on [employer] formula 3 cans/day, slow bolus( run at 80 ml/hr total volume 960 ml/day> 1st can around 6:30-8:30, 2nd can around 3 pm, 3rd can around 8-9 pm) plus Banatrol 1/2 packet BID, was increasing to 1 packet BID, plus Kefir 10 oz/day ( divided in multiple times throughout the day).

## 2024-10-21 NOTE — PROGRESS NOTE ADULT - PROBLEM SELECTOR PLAN 1
- Please monitor blood glucose values q 6 hours while on tube feeding or NPO  - Continue Lantus 15units at HS while on D5 infusion  -If D5 is discontinued please decrease Lantus to 10 units q hs  - Discontinue TF Admelog insulin doses until pt back on TFs.  - Change Admelog correction scale to low dose q6h while NPO  - Please contact endo once back on TFs for insulin recs.   DISCHARGE PLANNING:  - TBD depending on insulin requirement and discharge tube feeding regimen ( Bolus vs continuous tube feeding )   - If bolus tube feeding > Lantus plus Humalog   - If continuous tube feeding > Lantus plus Regular insulin    - Continue to check blood glucose 4 x daily  - Please make sure patient has all needed supplies: glucometer, test strips, lancets, alcohol swabs, pen needles.   - Followup with Dr Ruth: [facility]: [address] [place] [address]. [address]. [place] [address]. Tel: [phone]

## 2024-10-21 NOTE — PROGRESS NOTE ADULT - ASSESSMENT
75 yo F PMH T2DM on insulin, HTN, HLD, hypothyroidism, RA on Prednisone, Fibromyalgia, cerebral aneurysm s/p repair, chronic hypercapnic respiratory failure s/p trach (vent dependent) and chronic PEG 2022, recurrent C. diff infection , persistent GJ tube leaks now s/p GC fistula repair 8/12  admitted 10/7/24 with resp distress and found to have RML opacity     Antibiotics  Ceftriaxone 10/7  Azithro 10/7  Cefepime 10/7--> 10/12  meropenem 10/15 -->   IV Vanco 10/17 --> 10/21      10/10 melena, anemia, blood tx  10/14 EGD for GJ exchange and piror PEG site closure  One benign-appearing, intrinsic moderate stenosis was found in the upper third of the        esophagus. The stenosis was traversed.       The cardia and gastric fundus were normal.       A gastric tube with tip in the jejunum was found in the gastric body. The PEG required        removal because it was leaking. The J tube extension (12F) was removed first. An externally        removable 24 Fr EndoVive Safety gastrostomy tube was lubricated. The guide wire was passed        through the existing G-tube port and snared endoscopically. The endoscope and snare were then        removed, pulling the wire out through the mouth. The g-tube was tied to the guidewire, pulled        through the mouth into the stomach and then pulled out from the stomach through the skin. The        bumper was attached to the gastrostomy tube. The feeding tube was then cut to an appropriate        length. The final position of the gastrostomy tube was confirmed by relook endoscopy, and        skin marking noted to be 3 cm at the external bumper. The final tension and compression of        the abdominal wall by the PEG tube and external bumper were checked and revealed that the        bumper was moderately tight and mildly deforming the skin. The J tube extension (12 F        EndoVive Jejunal feeding tube) was advanced into the stomach. The tip of the J tube had a        loop thread that was captured with a Resolution clip. The thread and the J tube extension        were advanced to the proximal jejunum, and the endoclip along with the tip of the J tube was        attached to the wall securely. The J tube extension was capped, and the tube site was cleaned        and dressed.       A small fistula was found on the anterior wall of the gastric body related to prior G tube        site. Coagulation of tissue near the fistula using argon plasma at 1.2 liters/minute and 35        peraza was successful. To closure the gastrocutaneous fistula, four hemostatic clips were        successfully placed (MR conditional, two 16 mm DuraClip and 2 Mantis clips.       The examined duodenum was normal.    10/14, 10/15 Fever, transient hypoxia post procedure  10/15 ProCalcitonin = 8.48 suggestive of bacterial process; BCx x2 NGTD; Trach: Pseudomonas   - though benign mg stain  10;16 Leukocytosis WBC = 14.26  10/17 fever, hypotension, abd distension, no diarrhea noted this am WBC = 15.02; Rising Procalcitonin = 38.49; Obstructed J tube   10/18  lost IV access re-gained  - CT scan late this afternoon  WBC = 8.68; Rising Procalcitonin >100  10/18 imaging suggests multifocal pneumonia  10/21 improved chest exam, Procalcitonin level considerably decreased, decreased FiO2  - afebrile since 10/18    Suggest  Continue Meropenem     - will continue for multifocal pneumonia through 10/24  STOP  IV Vanco     discussed with ACP  spoke with daughter by phone

## 2024-10-21 NOTE — PROGRESS NOTE ADULT - SUBJECTIVE AND OBJECTIVE BOX
Follow Up:  fever    Interval History/ROS:  comfortable on vent  RN notes modest mucoid resp secretions    Allergies  walnut (Unknown)  metronidazole (Rash)  Lyrica (Unknown)  penicillin (Unknown)  Pineapple (Unknown)  Tagamet (Unknown)  heparin (Unknown)  Pecans (Unknown)  Hazelnut (Unknown)  meropenem (Rash)        ANTIMICROBIALS:  meropenem  IVPB 1000 every 8 hours      OTHER MEDS:  MEDICATIONS  (STANDING):  albuterol/ipratropium for Nebulization 3 every 6 hours  dextrose 50% Injectable 12.5 once  dextrose 50% Injectable 25 once  diphenhydrAMINE Injectable 12.5 <User Schedule>  escitalopram 10 <User Schedule>  fentaNYL   Patch  12 MICROgram(s)/Hr 1 every 72 hours  fentaNYL   Patch  25 MICROgram(s)/Hr 1 every 72 hours  furosemide   Injectable 20 once  gabapentin Solution 250 <User Schedule>  glucagon  Injectable 1 once  influenza  Vaccine (HIGH DOSE) 0.5 once  insulin glargine Injectable (LANTUS) 15 at bedtime  insulin lispro (ADMELOG) corrective regimen sliding scale  every 6 hours  levothyroxine 125 daily  melatonin Liquid 12 <User Schedule>  oxyCODONE    IR 2.5 every 6 hours PRN  pantoprazole  Injectable 40 every 12 hours  QUEtiapine 12.5 <User Schedule>  simethicone 160 <User Schedule>      Vital Signs Last 24 Hrs  T(C): 36.7 (21 Oct 2024 11:42), Max: 36.7 (20 Oct 2024 19:24)  T(F): 98 (21 Oct 2024 11:42), Max: 98.1 (21 Oct 2024 00:00)  HR: 73 (21 Oct 2024 15:01) (70 - 106)  BP: 153/62 (21 Oct 2024 11:42) (153/62 - 176/77)  BP(mean): --  RR: 18 (21 Oct 2024 11:42) (18 - 20)  SpO2: 100% (21 Oct 2024 15:01) (97% - 100%)    Parameters below as of 21 Oct 2024 11:42  Patient On (Oxygen Delivery Method): ventilator          PHYSICAL EXAM:  General: NAD, Non-toxic  Neurology: sleepy  Respiratory: trach in place on Vent FiO2 = 0.3 fine crackles   - improved chest exam with less congestion compared with last week.  CV: RRR, S1S2, no murmurs, rubs or gallops  Abdominal: Soft, Non-tender, non-distended, normal bowel sounds  Extremities: modest generalized  edema,  Line Sites: Clear  Skin: No rash                          7.8    4.44  )-----------( 111      ( 21 Oct 2024 06:43 )             26.6       10-21    139  |  103  |  10  ----------------------------<  166[H]  3.2[L]   |  21[L]  |  <0.30[L]    Ca    8.3[L]      21 Oct 2024 06:43  Phos  3.4     10-21  Mg     1.6     10-21    (10.21.24 @ 06:43) Procalcitonin: 23.10 ng/mL  10.20.24 @ 07:02) Procalcitonin: 47.59  (10.19.24 @ 04:22) Procalcitonin: 88.93  (10.18.24 @ 14:48) Procalcitonin: >100.00:  (10.18.24 @ 06:42) Procalcitonin: >100.00  (10.17.24 @ 07:15) Procalcitonin: 38.49:  (10.15.24 @ 14:12) Procalcitonin: 8.48:     MICROBIOLOGY:  .Blood BLOOD  10-17-24   No growth at 72 Hours  --  --      .Blood BLOOD  10-17-24   No growth at 72 Hours  --  --      .Blood BLOOD  10-15-24   No growth at 5 days  --  --      Trach Asp Tracheal Aspirate  10-15-24   Mixed gram negative rods including  Numerous Pseudomonas aeruginosa  Commensal vinay consistent with body site      .Blood BLOOD  10-15-24   No growth at 5 days  --  --      Trach Asp Tracheal Aspirate  10-08-24   Few Pseudomonas aeruginosa  Commensal vinay consistent with body site  --  Pseudomonas aeruginosa      Clean Catch Clean Catch (Midstream)  10-07-24   10,000 - 49,000 CFU/mL Escherichia coli ESBL  --  Escherichia coli ESBL      .Blood BLOOD  10-07-24   No growth at 5 days  --  --      .Blood BLOOD  10-07-24   No growth at 5 days  --  --      Clean Catch Clean Catch (Midstream)  10-03-24   Culture grew 3 or more types of organisms which indicate  collection contamination; consider recollection only if clinically  indicated.  --  --    RADIOLOGY:  < from: CT Chest w/ IV Cont (10.18.24 @ 17:18) >  FINDINGS:  CHEST:  LUNGS AND LARGE AIRWAYS: Tracheostomy tube terminates at the nick.    Patchy opacities in all 5 lobes, new since 10/7/2024.  PLEURA: Small pleural effusions, increased from 10/7/2024 and 4/12/2024.  VESSELS: Aortic and coronary artery calcification.  HEART: Heart size is normal. No pericardial effusion. Aortic valve   calcification.  MEDIASTINUM AND ROSEMARY: Mediastinal adenopathy. For reference, precarinal   lymph node measures 1.8 x 1.3 cm, probably reactive.  CHEST WALL AND LOWER NECK: Within normal limits.    ABDOMEN AND PELVIS:  LIVER: Steatosis.  BILE DUCTS: Normal caliber.  GALLBLADDER: Cholelithiasis.  SPLEEN: Within normal limits.  PANCREAS: Within normal limits.  ADRENALS: Within normal limits.  KIDNEYS/URETERS: No hydronephrosis. Symmetric enhancement. Right upper   pole hypodense focus too small to characterize.    BLADDER: Within normal limits.  REPRODUCTIVE ORGANS: Uterus and adnexa within normal limits.    BOWEL: Gastrojejunostomy tube in place. No bowel obstruction. Appendix is   not visualized.  PERITONEUM/RETROPERITONEUM: Trace pelvic fluid.  VESSELS: Calcified splenic artery aneurysm measuring up to 7 mm.   Atherosclerotic changes  LYMPH NODES: No lymphadenopathy.  ABDOMINAL WALL: Small subcutaneous edema, increased from 10/7/2024.   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  BONES: Degenerative changes. Chronic left pubic bone fractures. Similar   multilevel vertebral body height loss.    IMPRESSION:    Multifocal pneumonia.    New small pleural effusions, and increased subcutaneous edema compared to   10/7/2024.    Gastrocutaneous fistula. No evidence of drainable fluid collection.    < end of copied text >      Zion Newman MD; Division of Infectious Disease; Pager: [phone]; nights and weekends: [phone]

## 2024-10-21 NOTE — PROGRESS NOTE ADULT - ASSESSMENT
75 yo F PMH T2DM on insulin, HTN, HLD, hypothyroidism, RA on Prednisone, Fibromyalgia, cerebral aneurysm s/p repair, chronic hypercapnic respiratory failure s/p trach (vent dependent) and chronic PEG 2022, recurrent C. diff infection (follows with Dr. 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  Pt also noted to have rectal bleeding this past week requiring transfusion 5 days ago in ED as per daughter. Daughter also reports brownish discharge from G-J tube. In ED, pt afebrile, fiO2 96-98% on 40% on ventilator.  Chest X-ray w/ opacification of right lung concerning for possible PNA.  Admitted to [facility]CU for further management. Sputum cxs grew PSA; treated with course of Cefepime. Patient with decreased H+H received 1 unit of PRBCS on 10/10.  10/14 EGD w/ Dr. Rosales for PEGJ exchange and clippings placed for closure of fistula site.  Persistent fevers treating with meropenem and vanc.  Awaiting CT A/P to r/o sources of infection.  Continue airway management with duonebs, chest PT and suction PRN.        10/20:  Still unclear of infectious source.  CT A/P w/ contrast performed.  Continuing meropenem and DC'd vanc for now.  PEGJ remains clogged and unable to use - planned for Monday evening, remains NPO. Unable to administer Viokace via J tube today d/t resistance. Will attempt PO midodrine via G/suction port which has been OK'd for use as per Dr. Rosales.  Pt appears to be volume overloaded, gave Lasix 20mg IVPx1, continue decrease Lantus dose and administer Midodrine; Discussed plan with  at bedside. 75 yo F PMH T2DM on insulin, HTN, HLD, hypothyroidism, RA on Prednisone, Fibromyalgia, cerebral aneurysm s/p repair, chronic hypercapnic respiratory failure s/p trach (vent dependent) and chronic PEG 2022, recurrent C. diff infection (follows with Dr. 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  Pt also noted to have rectal bleeding this past week requiring transfusion 5 days ago in ED as per daughter. Daughter also reports brownish discharge from G-J tube. In ED, pt afebrile, fiO2 96-98% on 40% on ventilator.  Chest X-ray w/ opacification of right lung concerning for possible PNA.  Admitted to RCU for further management. Sputum cxs grew PSA; treated with course of Cefepime. Patient with decreased H+H received 1 unit of PRBCS on 10/10.  10/14 EGD w/ Dr. Rosales for PEGJ exchange and clippings placed for closure of fistula site.  Persistent fevers treating with meropenem and vanc.  CT A/P without infectious source.  Continue airway management with duonebs, chest PT and suction PRN.        10/21:  CT A/P without infectious source.  Pt overall improving, will suspect this was all likely related to PNA.  Continue meropenem as per Dr. Newman.  Plan for new PEGJ with Dr. Bobby irene.  S/p IVP Lasix 20mg x1. 75 yo F PMH T2DM on insulin, HTN, HLD, hypothyroidism, RA on Prednisone, Fibromyalgia, cerebral aneurysm s/p repair, chronic hypercapnic respiratory failure s/p trach (vent dependent) and chronic PEG 2022, recurrent C. diff infection (follows with Dr. 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  Pt also noted to have rectal bleeding this past week requiring transfusion 5 days ago in ED as per daughter. Daughter also reports brownish discharge from G-J tube. In ED, pt afebrile, fiO2 96-98% on 40% on ventilator.  Chest X-ray w/ opacification of right lung concerning for possible PNA.  Admitted to RCU for further management. Sputum cxs grew PSA; treated with course of Cefepime. Patient with decreased H+H received 1 unit of PRBCS on 10/10.  10/14 EGD w/ Dr. Rosales for PEGJ exchange and clippings placed for closure of fistula site.  Persistent fevers treated with meropenem and vanc (d/c'd 10/20).  CT A/P without infectious source.  Continue airway management with duonebs, chest PT and suction PRN.        10/21:  CT A/P without infectious source.  Pt overall improving, will suspect this was all likely related to PNA.  Continue meropenem as per Dr. Newman.  Plan for new PEGJ with Dr. Bobby irene.  S/p IVP Lasix 20mg x1.

## 2024-10-22 NOTE — PROGRESS NOTE ADULT - PROBLEM SELECTOR PLAN 1
- Please monitor blood glucose values q 6 hours while on tube feeding or NPO  - Increase Lantus to 20 units at HS now that she is back on TF  - Will hold off on restarting TF Admelog insulin doses until she is at or closer to goal rate.  - Change Admelog correction scale to moderate admelog scale q6h while on TF.   DISCHARGE PLANNING:  - TBD depending on insulin requirement and discharge tube feeding regimen ( Bolus vs continuous tube feeding )   - If bolus tube feeding > Lantus plus Humalog   - Continue to check blood glucose 4 x daily  - Please make sure patient has all needed supplies: glucometer, test strips, lancets, alcohol swabs, pen needles.   - Followup with Dr Ruth: [facility]: [address] [place] [address]. [address]. [place] [address]. Tel: [phone] Telemedicine

## 2024-10-22 NOTE — PROGRESS NOTE ADULT - ASSESSMENT
73 yo F PMH T2DM on insulin, HTN, HLD, hypothyroidism, RA on Prednisone, Fibromyalgia, cerebral aneurysm s/p repair, chronic hypercapnic respiratory failure s/p trach (vent dependent) and chronic PEG 2022, recurrent C. diff infection (follows with Dr. 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 s/p antibiotics and s/p GJ tube exchanges on 10/14     Endocrine consulted for Type 2 DM management while on tube feeding. Peg tube has been replaced and now TF has restarted. Currently at a rate of 40cc/hr with a goal of80cc/hr. Will modify insulin doses now that she is back on feeding.  BG Goal 100-180mg/dl. On chronic prednisone 5 mg daily.     - Home DM medications: Lantus 35 units at HS, Humalog 26 units with # 1 feeding, 22 units with #2 tube feeding, 20 units with #3 tube feeding and  Humalog correction scale (  2 units for -253, 4 units for -250, 6 units for -300, 8units for -350, 10 units for -400, 12 units for -450)   while on [employer] formula 3 cans/day, slow bolus( run at 80 ml/hr total volume 960 ml/day> 1st can around 6:30-8:30, 2nd can around 3 pm, 3rd can around 8-9 pm) plus Banatrol 1/2 packet BID, was increasing to 1 packet BID, plus Kefir 10 oz/day ( divided in multiple times throughout the day).

## 2024-10-22 NOTE — PROGRESS NOTE ADULT - ASSESSMENT
73 yo F PMH T2DM on insulin, HTN, HLD, hypothyroidism, RA on Prednisone, Fibromyalgia, cerebral aneurysm s/p repair, chronic hypercapnic respiratory failure s/p trach (vent dependent) and chronic PEG 2022, recurrent C. diff infection , persistent GJ tube leaks now s/p GC fistula repair 8/12  admitted 10/7/24 with resp distress and found to have RML opacity     Antibiotics  Ceftriaxone 10/7  Azithro 10/7  Cefepime 10/7--> 10/12  meropenem 10/15 -->   IV Vanco 10/17 --> 10/21      10/10 melena, anemia, blood tx  10/14 EGD for GJ exchange and piror PEG site closure  One benign-appearing, intrinsic moderate stenosis was found in the upper third of the        esophagus. The stenosis was traversed.       The cardia and gastric fundus were normal.       A gastric tube with tip in the jejunum was found in the gastric body. The PEG required        removal because it was leaking. The J tube extension (12F) was removed first. An externally        removable 24 Fr EndoVive Safety gastrostomy tube was lubricated. The guide wire was passed        through the existing G-tube port and snared endoscopically. The endoscope and snare were then        removed, pulling the wire out through the mouth. The g-tube was tied to the guidewire, pulled        through the mouth into the stomach and then pulled out from the stomach through the skin. The        bumper was attached to the gastrostomy tube. The feeding tube was then cut to an appropriate        length. The final position of the gastrostomy tube was confirmed by relook endoscopy, and        skin marking noted to be 3 cm at the external bumper. The final tension and compression of        the abdominal wall by the PEG tube and external bumper were checked and revealed that the        bumper was moderately tight and mildly deforming the skin. The J tube extension (12 F        EndoVive Jejunal feeding tube) was advanced into the stomach. The tip of the J tube had a        loop thread that was captured with a Resolution clip. The thread and the J tube extension        were advanced to the proximal jejunum, and the endoclip along with the tip of the J tube was        attached to the wall securely. The J tube extension was capped, and the tube site was cleaned        and dressed.       A small fistula was found on the anterior wall of the gastric body related to prior G tube        site. Coagulation of tissue near the fistula using argon plasma at 1.2 liters/minute and 35        peraza was successful. To closure the gastrocutaneous fistula, four hemostatic clips were        successfully placed (MR conditional, two 16 mm DuraClip and 2 Mantis clips.       The examined duodenum was normal.    10/14, 10/15 Fever, transient hypoxia post procedure  10/15 ProCalcitonin = 8.48 suggestive of bacterial process; BCx x2 NGTD; Trach: Pseudomonas   - though benign mg stain  10;16 Leukocytosis WBC = 14.26  10/17 fever, hypotension, abd distension, no diarrhea noted this am WBC = 15.02; Rising Procalcitonin = 38.49; Obstructed J tube   10/18  lost IV access re-gained  - CT scan late this afternoon  WBC = 8.68; Rising Procalcitonin >100  10/18 imaging suggests multifocal pneumonia  10/21 improved chest exam, Procalcitonin level considerably decreased, decreased FiO2  - afebrile since 10/18  10/21 UGI endoscopy done  Findings:       The esophagus was normal. A fistula was found on the anterior wall of the gastric body.       There was evidence of a gastrostomy present in the gastric body. The patient was placed in        the supine position. The endoscope was advanced to the jejunum and the prior placed J tube        with loop attached to the wall and the loop thread was cut by endoclip. The J tube and the G        tube were removed. The gastrostomy fistula was utilized and an [employer] 22 F        Gastrostomy/Jejunal tube was advanced. The jejunal tip was advanced through the pylorus and        into the duodenum. The endoscope was then advanced to the duodenum and the loop thread at the        tip of the J tube was captured and advanced to the proximal jejunum. The loop thread was        clipped to wall by a [employer] Resolution clip. The J tube flushed easily and the G        tube decompress the abdomen easily. The internal balloon was insufflated with 10 cc of water        to hold the GJ tube in place. The outside marking was at 3.    Suggest  Continue Meropenem     - continue for multifocal pneumonia through 10/24      discussed with pulmonary attending, IVAN   Routine  care and anticipatory guidance

## 2024-10-22 NOTE — PROGRESS NOTE ADULT - PROBLEM SELECTOR PLAN 2
Thyroid Function Tests:  10-16 @ 06:50 TSH 1.74 FreeT4 1.2 T3 -- Anti TPO -- Anti Thyroglobulin Ab -- TSI --  C/w LT4 125mcg day  Please make sure LT4 is given on an empty stomach at least one hour apart from other meds and food to ensure med absorption AND 4 HOURS APART FROM CA/FE/MVI/PPI!!   Follow up levels as out patient.

## 2024-10-22 NOTE — PROGRESS NOTE ADULT - SUBJECTIVE AND OBJECTIVE BOX
Chief Complaint: Diabetes Mellitus follow up    INTERVAL HX:  Patient seen at bedside with an aide present.   Peg tube was replaced and TF have been restarted as of 11pm on 10/21/24. Currently receiving [employer] Peptide 1.5 running at 40 cc/hr at timeof exam, goal is 80cc/hr x 24 hours.   BG over the last 24 hrs have been mostly at goal range 100-180mg/dl, with fasting hyperglycemia at 223mg/dl. this morning.   No hypoglycemia.     Review of Systems:  General: As above  GI: No nausea, vomiting  Endocrine: no  S&Sx of hypoglycemia    Allergies    walnut (Unknown)  metronidazole (Rash)  Lyrica (Unknown)  penicillin (Unknown)  Pineapple (Unknown)  Tagamet (Unknown)  heparin (Unknown)  Pecans (Unknown)  Hazelnut (Unknown)  meropenem (Rash)    Intolerances      MEDICATIONS  (STANDING):  albuterol/ipratropium for Nebulization 3 milliLiter(s) Nebulizer every 6 hours  artificial  tears Solution 1 Drop(s) Both EYES every 6 hours  ascorbic acid 500 milliGRAM(s) Oral daily  Biotene Dry Mouth Oral Rinse 5 milliLiter(s) Swish and Spit every 6 hours  calcium carbonate 1250 mG  + Vitamin D (OsCal 500 + D) 1 Tablet(s) Oral <User Schedule>  chlorhexidine 0.12% Liquid 15 milliLiter(s) Oral Mucosa every 12 hours  chlorhexidine 4% Liquid 1 Application(s) Topical daily  dextrose 5%. 1000 milliLiter(s) (100 mL/Hr) IV Continuous <Continuous>  dextrose 5%. 1000 milliLiter(s) (50 mL/Hr) IV Continuous <Continuous>  dextrose 50% Injectable 25 Gram(s) IV Push once  dextrose 50% Injectable 12.5 Gram(s) IV Push once  diclofenac sodium 1% Gel 2 Gram(s) Topical every 6 hours  diphenhydrAMINE Injectable 12.5 milliGRAM(s) IV Push <User Schedule>  escitalopram 10 milliGRAM(s) Oral <User Schedule>  gabapentin Solution 250 milliGRAM(s) Oral <User Schedule>  glucagon  Injectable 1 milliGRAM(s) IntraMuscular once  hemorrhoidal Ointment 1 Application(s) Rectal at bedtime  influenza  Vaccine (HIGH DOSE) 0.5 milliLiter(s) IntraMuscular once  insulin glargine Injectable (LANTUS) 15 Unit(s) SubCutaneous at bedtime  insulin lispro (ADMELOG) corrective regimen sliding scale   SubCutaneous every 6 hours  lactobacillus acidophilus 1 Tablet(s) Oral <User Schedule>  levothyroxine 125 MICROGram(s) Oral daily  lidocaine   4% Patch 1 Patch Transdermal every 24 hours  melatonin Liquid 12 milliGRAM(s) Oral <User Schedule>  nystatin Powder 1 Application(s) Topical every 8 hours  pantoprazole  Injectable 40 milliGRAM(s) IV Push every 12 hours  prednisoLONE acetate 1% Suspension 1 Drop(s) Both EYES every 6 hours  predniSONE   Tablet 5 milliGRAM(s) Oral <User Schedule>  QUEtiapine 12.5 milliGRAM(s) Oral <User Schedule>  simethicone 160 milliGRAM(s) Chew <User Schedule>  sodium chloride 1 Gram(s) Oral every 12 hours  sodium chloride 0.65% Nasal 1 Spray(s) Both Nostrils every 6 hours  witch hazel Pads 1 Application(s) Topical every 12 hours        insulin glargine Injectable (LANTUS)   15 Unit(s) SubCutaneous (10-21-24 @ 21:34)    insulin lispro (ADMELOG) corrective regimen sliding scale   1 Unit(s) SubCutaneous (10-22-24 @ 12:14)   2 Unit(s) SubCutaneous (10-22-24 @ 06:04)   3 Unit(s) SubCutaneous (10-21-24 @ 17:45)    levothyroxine   125 MICROGram(s) Oral (10-22-24 @ 05:39)    predniSONE   Tablet   5 milliGRAM(s) Oral (10-22-24 @ 14:02)        PHYSICAL EXAM:  VITALS: T(C): 36.3 (10-22-24 @ 12:04)  T(F): 97.3 (10-22-24 @ 12:04), Max: 98.3 (10-21-24 @ 17:51)  HR: 81 (10-22-24 @ 12:04) (60 - 104)  BP: 143/66 (10-22-24 @ 12:04) (121/76 - 206/92)  RR:  (15 - 18)  SpO2:  (98% - 107%)  Wt(kg): --  GENERAL: NAD  Respiratory: Respirations unlabored trached.   Extremities: Warm, no edema  NEURO: Alert , appropriate     LABS:  POCT Blood Glucose.: 191 mg/dL (10-22-24 @ 12:07)  POCT Blood Glucose.: 223 mg/dL (10-22-24 @ 05:50)  POCT Blood Glucose.: 192 mg/dL (10-21-24 @ 20:53)  POCT Blood Glucose.: 252 mg/dL (10-21-24 @ 17:26)  POCT Blood Glucose.: 206 mg/dL (10-21-24 @ 11:20)  POCT Blood Glucose.: 172 mg/dL (10-21-24 @ 06:03)  POCT Blood Glucose.: 163 mg/dL (10-21-24 @ 00:06)  POCT Blood Glucose.: 182 mg/dL (10-20-24 @ 18:44)  POCT Blood Glucose.: 160 mg/dL (10-20-24 @ 12:07)  POCT Blood Glucose.: 115 mg/dL (10-20-24 @ 06:00)  POCT Blood Glucose.: 146 mg/dL (10-19-24 @ 23:45)  POCT Blood Glucose.: 208 mg/dL (10-19-24 @ 18:06)                          8.0    2.76  )-----------( 124      ( 22 Oct 2024 07:00 )             27.2     10-22    139  |  101  |  8   ----------------------------<  215[H]  2.4[LL]   |  26  |  <0.30[L]    Ca    8.1[L]      22 Oct 2024 07:07  Phos  2.3     10-22  Mg     1.6     10-22      eGFR: 113 mL/min/1.73m2 (22 Oct 2024 07:07)      Thyroid Function Tests:  10-16 @ 06:50 TSH 1.74 FreeT4 1.2 T3 -- Anti TPO -- Anti Thyroglobulin Ab -- TSI --      A1C with Estimated Average Glucose Result: 5.8 % (10-14-24 @ 05:08)  A1C with Estimated Average Glucose Result: 6.4 % (08-12-24 @ 07:32)    Estimated Average Glucose: 120 mg/dL (10-14-24 @ 05:08)  Estimated Average Glucose: 137 mg/dL (08-12-24 @ 07:32)        Diet, NPO with Tube Feed:   Tube Feeding Modality: Jejunostomy  [employer] Peptide 1.5 ([id number])  Total Volume for 24 Hours (mL): 1920  Continuous  Starting Tube Feed Rate mL per Hour: 20  Increase Tube Feed Rate by (mL): 20     Every 4 hours  Until Goal Tube Feed Rate (mL per Hour): 80  Tube Feed Duration (in Hours): 24  Tube Feed Start Time: 23:00  Alfred(7 Gm Arginine/7 Gm Glut/1.2 Gm HMB     Qty per Day:  2  No Carb Prosource (1pkg = 15gms Protein)     Qty per Day:  3  Banatrol TF     Qty per Day:  1/2 pkt 2x daily (10-22-24 @ 11:54) [Active]

## 2024-10-22 NOTE — PROGRESS NOTE ADULT - PROBLEM SELECTOR PLAN 2
[] PNA  - CXR 10/7: Opacification of the right middle lobe may represent pneumonia versus atelectasis  - sputum culture 10/8 PSA  - completed course of cefepime on 10/12    [] c. diff  - vanco IV started 10/17, adjust by trough    [] persistent fevers  - meropenem started since 10/15  - CT A/P with ?multifocal pneumonia, presence of gastrocutaneous fistula. otherwise no collections/abscess noted [] PNA  - CXR 10/7: Opacification of the right middle lobe may represent pneumonia versus atelectasis  - sputum culture 10/8 PSA  - completed course of cefepime on 10/12    [] c. diff  - vanco IV started 10/17, adjust by trough    [] persistent fevers  - meropenem started since 10/15-10/24  - CT A/P with ?multifocal pneumonia, presence of gastrocutaneous fistula. otherwise no collections/abscess noted.

## 2024-10-22 NOTE — PROGRESS NOTE ADULT - SUBJECTIVE AND OBJECTIVE BOX
Follow Up:  pneumonia    Interval History/ROS:  appears comfortable on vent   - complaining RUE pain    Allergies  walnut (Unknown)  metronidazole (Rash)  Lyrica (Unknown)  penicillin (Unknown)  Pineapple (Unknown)  Tagamet (Unknown)  heparin (Unknown)  Pecans (Unknown)  Hazelnut (Unknown)  meropenem (Rash)        ANTIMICROBIALS:  meropenem  IVPB 1000 every 8 hours      OTHER MEDS:  MEDICATIONS  (STANDING):  albuterol/ipratropium for Nebulization 3 every 6 hours  dextrose 50% Injectable 25 once  dextrose 50% Injectable 12.5 once  diphenhydrAMINE Injectable 12.5 <User Schedule>  escitalopram 10 <User Schedule>  fentaNYL   Patch  12 MICROgram(s)/Hr 1 every 72 hours  fentaNYL   Patch  25 MICROgram(s)/Hr 1 every 72 hours  gabapentin Solution 250 <User Schedule>  glucagon  Injectable 1 once  influenza  Vaccine (HIGH DOSE) 0.5 once  insulin glargine Injectable (LANTUS) 15 at bedtime  insulin lispro (ADMELOG) corrective regimen sliding scale  every 6 hours  levothyroxine 125 daily  melatonin Liquid 12 <User Schedule>  pantoprazole  Injectable 40 every 12 hours  predniSONE   Tablet 5 <User Schedule>  QUEtiapine 12.5 <User Schedule>  simethicone 160 <User Schedule>      Vital Signs Last 24 Hrs  T(C): 36.2 (22 Oct 2024 05:30), Max: 36.8 (21 Oct 2024 17:51)  T(F): 97.1 (22 Oct 2024 05:30), Max: 98.3 (21 Oct 2024 17:51)  HR: 98 (22 Oct 2024 08:59) (60 - 104)  BP: 131/90 (22 Oct 2024 05:30) (121/76 - 206/92)  BP(mean): 111 (21 Oct 2024 22:00) (106 - 124)  RR: 18 (22 Oct 2024 05:30) (15 - 18)  SpO2: 102% (22 Oct 2024 08:59) (98% - 107%)    Parameters below as of 22 Oct 2024 06:06  Patient On (Oxygen Delivery Method): ventilator        PHYSICAL EXAM:  General:  NAD, Non-toxic  Neurology: A&Ox3, nonfocal  Respiratory: trach in place,  FiO2 = 0.3;  Clear to auscultation bilaterally  CV: RRR, S1S2, no murmurs, rubs or gallops  Abdominal: Soft, Non-tender, non-distended, normal bowel sounds  Extremities: moderate generalized edema,   Line Sites: Clear  Skin: No rash                          8.0    2.76  )-----------( 124      ( 22 Oct 2024 07:00 )             27.2       10-22    139  |  101  |  8   ----------------------------<  215[H]  2.4[LL]   |  26  |  <0.30[L]    Ca    8.1[L]      22 Oct 2024 07:07  Phos  2.3     10-22  Mg     1.6     10-22        Urinalysis Basic - ( 22 Oct 2024 07:07 )    Color: x / Appearance: x / SG: x / pH: x  Gluc: 215 mg/dL / Ketone: x  / Bili: x / Urobili: x   Blood: x / Protein: x / Nitrite: x   Leuk Esterase: x / RBC: x / WBC x   Sq Epi: x / Non Sq Epi: x / Bacteria: x    10.22.24 @ 07:07)  Procalcitonin: 13.69:  10.21.24 @ 06:43) Procalcitonin: 23.10 ng/mL  10.20.24 @ 07:02) Procalcitonin: 47.59  (10.19.24 @ 04:22) Procalcitonin: 88.93  (10.18.24 @ 14:48) Procalcitonin: >100.00:  (10.18.24 @ 06:42) Procalcitonin: >100.00  (10.17.24 @ 07:15) Procalcitonin: 38.49:  (10.15.24 @ 14:12) Procalcitonin: 8.48:       MICROBIOLOGY:  .Blood BLOOD  10-17-24   No growth at 4 days  --  --      .Blood BLOOD  10-17-24   No growth at 4 days  --  --      .Blood BLOOD  10-15-24   No growth at 5 days  --  --      Trach Asp Tracheal Aspirate  10-15-24   Mixed gram negative rods including  Numerous Pseudomonas aeruginosa  Commensal vinay consistent with body site  --  Pseudomonas aeruginosa      .Blood BLOOD  10-15-24   No growth at 5 days  --  --      Trach Asp Tracheal Aspirate  10-08-24   Few Pseudomonas aeruginosa  Commensal vinay consistent with body site  --  Pseudomonas aeruginosa      Clean Catch Clean Catch (Midstream)  10-07-24   10,000 - 49,000 CFU/mL Escherichia coli ESBL  --  Escherichia coli ESBL      .Blood BLOOD  10-07-24   No growth at 5 days  --  --      .Blood BLOOD  10-07-24   No growth at 5 days  --  --      Clean Catch Clean Catch (Midstream)  10-03-24   Culture grew 3 or more types of organisms which indicate  collection contamination; consider recollection only if clinically  indicated.  --  --    RADIOLOGY:  < from: CT Chest w/ IV Cont (10.18.24 @ 17:18) >  IMPRESSION:    Multifocal pneumonia.    New small pleural effusions, and increased subcutaneous edema compared to   10/7/2024.    Gastrocutaneous fistula. No evidence of drainable fluid collection.    < end of copied text >      Zion Newman MD; Division of Infectious Disease; Pager: [phone]; nights and weekends: [phone] yes

## 2024-10-22 NOTE — PROGRESS NOTE ADULT - PROBLEM SELECTOR PLAN 9
Patient has known hx of prior G-Tube stoma leak   - S/p EGD for closure of Gastric Fistula (8/12) w/ Total of 3 Mantis clips and two 22 mm Microtech clips by GI Dr Rosales   - Old stoma peg site with mild clear drainage s/p repair of fistula  - As per daughter feeds and meds all administered via j-port and g-tube remains to continuously vented for chronic gaseous distention   - 10/14 G-J exchanged by GI and clips applied to fistula site  - 10/17 PEGJ clogged  - 10/18 ok to use G port for feeds as per GI Dr. Rosales; overnight events as above. will attempt to give PO meds only however pt will need revision with Dr. Rosales on monday/tuesday. Patient has known hx of prior G-Tube stoma leak   - S/p EGD for closure of Gastric Fistula (8/12) w/ Total of 3 Mantis clips and two 22 mm Microtech clips by GI Dr Rosales   - Old stoma peg site with mild clear drainage s/p repair of fistula  - As per daughter feeds and meds all administered via j-port and g-tube remains to continuously vented for chronic gaseous distention   - 10/14 G-J exchanged by GI and clips applied to fistula site  - 10/17 PEGJ clogged  - 10/21; PEJ revision done, tolerating enteral feeds

## 2024-10-22 NOTE — PROGRESS NOTE ADULT - SUBJECTIVE AND OBJECTIVE BOX
Patient is a 74y old  Female who presents with a chief complaint of Patient admitted with Hypoxemia and episode of Bright red blood per rectum (21 Oct 2024 17:26)    HPI:  75 yo F PMH T2DM on insulin, HTN, HLD, hypothyroidism, RA on Prednisone, Fibromyalgia, cerebral aneurysm s/p repair, chronic hypercapnic respiratory failure s/p trach (vent dependent) and chronic PEG 2022, recurrent C. diff infection (follows with Dr. Newman), persistent GJ tube leaks now s/p GC fistula repair 8/12 BIBEMS with daughter for respiratory distress, hypoxia to high 80s.  Pt also noted to have rectal bleeding this past week requiring transfusion 5 days ago in ED as per daughter.  In ED, pt afebrile, fiO2 96-98% on 40% on ventilator.  Chest Xray w/ opacification of right lung concerning for possible PNA.  Admitted to RCU for further management.      (07 Oct 2024 18:58)    Interval Events:    REVIEW OF SYSTEMS:  [ ] Positive  [ ] All other systems negative  [ ] Unable to assess ROS because ________    Vital Signs Last 24 Hrs  T(C): 36.2 (10-22-24 @ 05:30), Max: 36.8 (10-21-24 @ 17:51)  T(F): 97.1 (10-22-24 @ 05:30), Max: 98.3 (10-21-24 @ 17:51)  HR: 60 (10-22-24 @ 06:06) (60 - 104)  BP: 131/90 (10-22-24 @ 05:30) (121/76 - 206/92)  RR: 18 (10-22-24 @ 05:30) (15 - 18)  SpO2: 100% (10-22-24 @ 06:06) (98% - 107%)    PHYSICAL EXAM:  HEENT:   [ ]Tracheostomy:  [ ]Pupils equal  [ ]No oral lesions  [ ]Abnormal    SKIN  [ ] No Rash  [ ] Abnormal  [ ] pressure    CARDIAC  [ ]Regular  [ ]Abnormal    PULMONARY  [ ]Bilateral Clear Breath Sounds  [ ]Normal Excursion  [ ]Abnormal    GI  [ ]PEG      [ ] +BS		              [ ]Soft, nondistended, nontender	  [ ]Abnormal    MUSCULOSKELETAL                                   [ ]Bedbound                 [ ]Abnormal    [ ]Ambulatory/OOB to chair                           EXTREMITIES                                         [ ]Normal  [ ]Edema                           NEUROLOGIC  [ ] Normal, non focal  [ ] Focal findings:    PSYCHIATRIC  [ ]Alert and appropriate  [ ] Sedated	 [ ]Agitated    :  Mcbride: [ ] Yes, if yes: Date of Placement:                   [  ] No    LINES: Central Lines [ ] Yes, if yes: Date of Placement                                     [  ] No    HOSPITAL MEDICATIONS:  MEDICATIONS  (STANDING):  albuterol/ipratropium for Nebulization 3 milliLiter(s) Nebulizer every 6 hours  artificial  tears Solution 1 Drop(s) Both EYES every 6 hours  ascorbic acid 500 milliGRAM(s) Oral daily  Biotene Dry Mouth Oral Rinse 5 milliLiter(s) Swish and Spit every 6 hours  calcium carbonate 1250 mG  + Vitamin D (OsCal 500 + D) 1 Tablet(s) Oral <User Schedule>  chlorhexidine 0.12% Liquid 15 milliLiter(s) Oral Mucosa every 12 hours  chlorhexidine 4% Liquid 1 Application(s) Topical daily  dextrose 5% + lactated ringers. 1000 milliLiter(s) (50 mL/Hr) IV Continuous <Continuous>  dextrose 5%. 1000 milliLiter(s) (100 mL/Hr) IV Continuous <Continuous>  dextrose 5%. 1000 milliLiter(s) (50 mL/Hr) IV Continuous <Continuous>  dextrose 50% Injectable 25 Gram(s) IV Push once  dextrose 50% Injectable 12.5 Gram(s) IV Push once  diclofenac sodium 1% Gel 2 Gram(s) Topical every 6 hours  diphenhydrAMINE Injectable 12.5 milliGRAM(s) IV Push <User Schedule>  escitalopram 10 milliGRAM(s) Oral <User Schedule>  fentaNYL   Patch  12 MICROgram(s)/Hr 1 Patch Transdermal every 72 hours  fentaNYL   Patch  25 MICROgram(s)/Hr 1 Patch Transdermal every 72 hours  gabapentin Solution 250 milliGRAM(s) Oral <User Schedule>  glucagon  Injectable 1 milliGRAM(s) IntraMuscular once  hemorrhoidal Ointment 1 Application(s) Rectal at bedtime  influenza  Vaccine (HIGH DOSE) 0.5 milliLiter(s) IntraMuscular once  insulin glargine Injectable (LANTUS) 15 Unit(s) SubCutaneous at bedtime  insulin lispro (ADMELOG) corrective regimen sliding scale   SubCutaneous every 6 hours  lactobacillus acidophilus 1 Tablet(s) Oral <User Schedule>  levothyroxine 125 MICROGram(s) Oral daily  lidocaine   4% Patch 1 Patch Transdermal every 24 hours  melatonin Liquid 12 milliGRAM(s) Oral <User Schedule>  meropenem  IVPB 1000 milliGRAM(s) IV Intermittent every 8 hours  nystatin Powder 1 Application(s) Topical every 8 hours  pantoprazole  Injectable 40 milliGRAM(s) IV Push every 12 hours  prednisoLONE acetate 1% Suspension 1 Drop(s) Both EYES every 6 hours  QUEtiapine 12.5 milliGRAM(s) Oral <User Schedule>  simethicone 160 milliGRAM(s) Chew <User Schedule>  sodium chloride 1 Gram(s) Oral every 12 hours  sodium chloride 0.65% Nasal 1 Spray(s) Both Nostrils every 6 hours  witch hazel Pads 1 Application(s) Topical every 12 hours    MEDICATIONS  (PRN):      LABS:                        8.0    2.76  )-----------( 124      ( 22 Oct 2024 07:00 )             27.2     10-22    139  |  101  |  8   ----------------------------<  215[H]  2.4[LL]   |  26  |  <0.30[L]    Ca    8.1[L]      22 Oct 2024 07:07  Phos  2.3     10-22  Mg     1.6     10-22    Mode: AC/ CMV (Assist Control/ Continuous Mandatory Ventilation)  RR (machine): 18  TV (machine): 350  FiO2: 30  PEEP: 5  ITime: 1  MAP: 10  PIP: 36       Patient is a 74y old  Female who presents with a chief complaint of Patient admitted with Hypoxemia and episode of Bright red blood per rectum (21 Oct 2024 17:26)    HPI:  73 yo F PMH T2DM on insulin, HTN, HLD, hypothyroidism, RA on Prednisone, Fibromyalgia, cerebral aneurysm s/p repair, chronic hypercapnic respiratory failure s/p trach (vent dependent) and chronic PEG 2022, recurrent C. diff infection (follows with Dr. Newman), persistent GJ tube leaks now s/p GC fistula repair 8/12 BIBEMS with daughter for respiratory distress, hypoxia to high 80s.  Pt also noted to have rectal bleeding this past week requiring transfusion 5 days ago in ED as per daughter.  In ED, pt afebrile, fiO2 96-98% on 40% on ventilator.  Chest Xray w/ opacification of right lung concerning for possible PNA.  Admitted to RCU for further management.  (07 Oct 2024 18:58)    Interval Events: S/P PEJ extension last evening. Enteral feeds restarted.    REVIEW OF SYSTEMS:  [ ] Positive  [x ] All other systems negative  [ ] Unable to assess ROS because ________    Vital Signs Last 24 Hrs  T(C): 36.2 (10-22-24 @ 05:30), Max: 36.8 (10-21-24 @ 17:51)  T(F): 97.1 (10-22-24 @ 05:30), Max: 98.3 (10-21-24 @ 17:51)  HR: 60 (10-22-24 @ 06:06) (60 - 104)  BP: 131/90 (10-22-24 @ 05:30) (121/76 - 206/92)  RR: 18 (10-22-24 @ 05:30) (15 - 18)  SpO2: 100% (10-22-24 @ 06:06) (98% - 107%)    PHYSICAL EXAM:  HEENT:   [X]Tracheostomy: #8 XLT shiley  [X]Pupils equal  [ ]No oral lesions  [ ]Abnormal    SKIN  [ ]No Rash  [X] Abnormal - IAD, MAD at old PEG site  [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    CARDIAC  [X]Regular  [ ]Abnormal    PULMONARY  [X]Bilateral Clear Breath Sounds  [ ]Normal Excursion  [ ]Abnormal    GI  [X]PEGJ      [X] +BS		              [X]Soft, nondistended, nontender	  [X]Abnormal - old PEGJ site c/d/i    MUSCULOSKELETAL                                   [X]Bedbound                 [ ]Abnormal    [ ]Ambulatory/OOB to chair                           EXTREMITIES                                         [ ]Normal  [X]Edema - generalized edema                        NEUROLOGIC  [ ] Normal, non focal  [X] Focal findings: awake, alert, following commands on all 4 extremities     PSYCHIATRIC  [X] Alert and appropriate  [ ] Sedated	 [ ]Agitated    :  Mcbride: [ ] Yes, if yes: Date of Placement:                   [X] No    LINES: Central Lines [ ] Yes, if yes: Date of Placement                                     [X] No    HOSPITAL MEDICATIONS:  MEDICATIONS  (STANDING):  albuterol/ipratropium for Nebulization 3 milliLiter(s) Nebulizer every 6 hours  artificial  tears Solution 1 Drop(s) Both EYES every 6 hours  ascorbic acid 500 milliGRAM(s) Oral daily  Biotene Dry Mouth Oral Rinse 5 milliLiter(s) Swish and Spit every 6 hours  calcium carbonate 1250 mG  + Vitamin D (OsCal 500 + D) 1 Tablet(s) Oral <User Schedule>  chlorhexidine 0.12% Liquid 15 milliLiter(s) Oral Mucosa every 12 hours  chlorhexidine 4% Liquid 1 Application(s) Topical daily  dextrose 5% + lactated ringers. 1000 milliLiter(s) (50 mL/Hr) IV Continuous <Continuous>  dextrose 5%. 1000 milliLiter(s) (100 mL/Hr) IV Continuous <Continuous>  dextrose 5%. 1000 milliLiter(s) (50 mL/Hr) IV Continuous <Continuous>  dextrose 50% Injectable 25 Gram(s) IV Push once  dextrose 50% Injectable 12.5 Gram(s) IV Push once  diclofenac sodium 1% Gel 2 Gram(s) Topical every 6 hours  diphenhydrAMINE Injectable 12.5 milliGRAM(s) IV Push <User Schedule>  escitalopram 10 milliGRAM(s) Oral <User Schedule>  fentaNYL   Patch  12 MICROgram(s)/Hr 1 Patch Transdermal every 72 hours  fentaNYL   Patch  25 MICROgram(s)/Hr 1 Patch Transdermal every 72 hours  gabapentin Solution 250 milliGRAM(s) Oral <User Schedule>  glucagon  Injectable 1 milliGRAM(s) IntraMuscular once  hemorrhoidal Ointment 1 Application(s) Rectal at bedtime  influenza  Vaccine (HIGH DOSE) 0.5 milliLiter(s) IntraMuscular once  insulin glargine Injectable (LANTUS) 15 Unit(s) SubCutaneous at bedtime  insulin lispro (ADMELOG) corrective regimen sliding scale   SubCutaneous every 6 hours  lactobacillus acidophilus 1 Tablet(s) Oral <User Schedule>  levothyroxine 125 MICROGram(s) Oral daily  lidocaine   4% Patch 1 Patch Transdermal every 24 hours  melatonin Liquid 12 milliGRAM(s) Oral <User Schedule>  meropenem  IVPB 1000 milliGRAM(s) IV Intermittent every 8 hours  nystatin Powder 1 Application(s) Topical every 8 hours  pantoprazole  Injectable 40 milliGRAM(s) IV Push every 12 hours  prednisoLONE acetate 1% Suspension 1 Drop(s) Both EYES every 6 hours  QUEtiapine 12.5 milliGRAM(s) Oral <User Schedule>  simethicone 160 milliGRAM(s) Chew <User Schedule>  sodium chloride 1 Gram(s) Oral every 12 hours  sodium chloride 0.65% Nasal 1 Spray(s) Both Nostrils every 6 hours  witch hazel Pads 1 Application(s) Topical every 12 hours    MEDICATIONS  (PRN):      LABS:                        8.0    2.76  )-----------( 124      ( 22 Oct 2024 07:00 )             27.2     10-22    139  |  101  |  8   ----------------------------<  215[H]  2.4[LL]   |  26  |  <0.30[L]    Ca    8.1[L]      22 Oct 2024 07:07  Phos  2.3     10-22  Mg     1.6     10-22    Mode: AC/ CMV (Assist Control/ Continuous Mandatory Ventilation)  RR (machine): 18  TV (machine): 350  FiO2: 30  PEEP: 5  ITime: 1  MAP: 10  PIP: 36

## 2024-10-22 NOTE — PROGRESS NOTE ADULT - ASSESSMENT
75 yo F PMH T2DM on insulin, HTN, HLD, hypothyroidism, RA on Prednisone, Fibromyalgia, cerebral aneurysm s/p repair, chronic hypercapnic respiratory failure s/p trach (vent dependent) and chronic PEG 2022, recurrent C. diff infection (follows with Dr. 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  Pt also noted to have rectal bleeding this past week requiring transfusion 5 days ago in ED as per daughter. Daughter also reports brownish discharge from G-J tube. In ED, pt afebrile, fiO2 96-98% on 40% on ventilator.  Chest X-ray w/ opacification of right lung concerning for possible PNA.  Admitted to RCU for further management. Sputum cxs grew PSA; treated with course of Cefepime. Patient with decreased H+H received 1 unit of PRBCS on 10/10.  10/14 EGD w/ Dr. Rosales for PEGJ exchange and clippings placed for closure of fistula site.  Persistent fevers treated with meropenem and vanc (d/c'd 10/20).  CT A/P without infectious source.  Continue airway management with duonebs, chest PT and suction PRN.        10/21:  CT A/P without infectious source.  Pt overall improving, will suspect this was all likely related to PNA.  Continue meropenem as per Dr. Newman.  Plan for new PEGJ with Dr. Bobby irene.  S/p IVP Lasix 20mg x1. 75 yo F PMH T2DM on insulin, HTN, HLD, hypothyroidism, RA on Prednisone, Fibromyalgia, cerebral aneurysm s/p repair, chronic hypercapnic respiratory failure s/p trach (vent dependent) and chronic PEG 2022, recurrent C. diff infection (follows with Dr. 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  Pt also noted to have rectal bleeding this past week requiring transfusion 5 days ago in ED as per daughter. Daughter also reports brownish discharge from G-J tube. In ED, pt afebrile, fiO2 96-98% on 40% on ventilator.  Chest X-ray w/ opacification of right lung concerning for possible PNA.  Admitted to RCU for further management. Sputum cxs grew PSA; treated with course of Cefepime. Patient with decreased H+H received 1 unit of PRBCS on 10/10.  10/14 EGD w/ Dr. Rosales for PEGJ exchange and clippings placed for closure of fistula site.  Persistent fevers treated with meropenem and vanc (d/c'd 10/20).  CT A/P without infectious source.  Continue airway management with duonebs, chest PT and suction PRN.        10/22: Continue meropenem until 10/24 as per Dr. Newman.  S/P PEGJ with Dr. Rosales, enteral feeds restarted and tolerating well.

## 2024-10-22 NOTE — PROGRESS NOTE ADULT - NUTRITIONAL ASSESSMENT
Diet, NPO with Tube Feed:   Tube Feeding Modality: Jejunostomy  Casie Asl Analytical Peptide 1.5 (KFPEPT1.5RTH)  Total Volume for 24 Hours (mL): 1920  Continuous  Starting Tube Feed Rate {mL per Hour}: 20  Increase Tube Feed Rate by (mL): 20     Every 4 hours  Until Goal Tube Feed Rate (mL per Hour): 80  Tube Feed Duration (in Hours): 24  Tube Feed Start Time: 23:00  Alfred(7 Gm Arginine/7 Gm Glut/1.2 Gm HMB     Qty per Day:  2  No Carb Prosource (1pkg = 15gms Protein)     Qty per Day:  3  Banatrol TF     Qty per Day:  1/2 pkt 2x daily (10-22-24 @ 11:54) [Active]            Please see RD assessment and/or follow up.  Managed by primary team as well

## 2024-10-22 NOTE — PROGRESS NOTE ADULT - NS ATTEND AMEND GEN_ALL_CORE FT
-    74F PMH DM2 (insulin-dependent), HTN, HLD, hypothyroidism, RA on Prednisone, fibromyalgia, cerebral aneurysm s/p repair, chronic hypoxemia and hypercapnic respiratory failure s/p trach, vent-dependent, recurrent C diff infection, oropharyngeal dysphagia s/p G-J tube with persistent GJ tube leaks now s/p GC fistula repair 8/12, and recent presentation 5 days PTA for rectal bleeding requiring transfusion in the ED who now presents with acute hypoxemic respiratory distress 2/2 RML PNA, admitted to the RCU for further management. Found to have Pseudomonas aeruginosa in sputum culture and urine culture with ESBL E. coli. Course complicated by hypotension requiring IVF hydration with improvement.    # Neuro: awake and alert, but with critical illness polyneuropathy. Outpatient f/up for hearing loss. Continue escitalopram and quetiapine. Fentanyl patch for pain along with diclofenac gel prn. Continue gabapentin. Oxycodone prn  # CV: HD stable. Off midodrine. Hold off on diuresis today given hypokalemia/hypomagnesemia. Improved volume overload  # Pulm: continue lung protective ventilation. Pressure support trials as tolerates. Shallow respirations on pressure support 15/5 today. Continue Duonebs  # GI: s/p G-J tube exchange by GI on 10/21. Keep G port for decompression of G-C fistula and J tube for meds and feeds. Advance feeds to goal as tolerates. Continue PPI  # Renal: stable kidney function. Replete hypokalemia, hypomagnesemia, and hypophosphatemia  # ID: continue meropenem for Pseudomonas aeruginosa pneumonia and ESBL E. coli UTI until 10/24. Appreciate ID follow-up. CT A/P unremarkable. Chest CT consistent with multifocal pneumonia. Will need repeat CT chest in 6 weeks to assess for resolution. Wound care follow-up for sacral wound  # Heme: SCD's for DVT ppx  # Endo: DM2, continue insulin coverage scale + Lantus 15 qhs. Continue levothyroxine. Change hydrocortisone to home prednisone 5 mg daily  # Rheum: RA, restart prednisone 5 mg daily. Hold off on Enbrel at this time  # Dispo: full code, prognosis guarded, discussed with patient,  at bedside, and daughter Marysol by phone

## 2024-10-23 NOTE — PROGRESS NOTE ADULT - PROBLEM SELECTOR PLAN 9
Patient has known hx of prior G-Tube stoma leak   - S/p EGD for closure of Gastric Fistula (8/12) w/ Total of 3 Mantis clips and two 22 mm Microtech clips by GI Dr Rosales   - Old stoma peg site with mild clear drainage s/p repair of fistula  - As per daughter feeds and meds all administered via j-port and g-tube remains to continuously vented for chronic gaseous distention   - 10/14 G-J exchanged by GI and clips applied to fistula site  - 10/17 PEGJ clogged  - 10/21; PEJ revision done, tolerating enteral feeds Patient has known hx of prior G-Tube stoma leak   - S/p EGD for closure of Gastric Fistula (8/12) w/ Total of 3 Mantis clips and two 22 mm Microtech clips by GI Dr Rosales   - Old stoma peg site with mild clear drainage s/p repair of fistula  - As per daughter feeds and meds all administered via j-port and g-tube remains to continuously vented for chronic gaseous distention   - 10/14 G-J exchanged by GI and clips applied to fistula site  - 10/17 PEGJ clogged  - 10/21 PEJ revision done, tolerating enteral feeds

## 2024-10-23 NOTE — PROGRESS NOTE ADULT - NUTRITIONAL ASSESSMENT
Diet, NPO with Tube Feed:   Tube Feeding Modality: Jejunostomy  LSEO Peptide 1.5 (KFPEPT1.5RTH)  Total Volume for 24 Hours (mL): 1920  Continuous  Starting Tube Feed Rate {mL per Hour}: 20  Increase Tube Feed Rate by (mL): 20     Every 4 hours  Until Goal Tube Feed Rate (mL per Hour): 80  Tube Feed Duration (in Hours): 24  Tube Feed Start Time: 23:00  Alfred(7 Gm Arginine/7 Gm Glut/1.2 Gm HMB     Qty per Day:  2  No Carb Prosource (1pkg = 15gms Protein)     Qty per Day:  3  Banatrol TF     Qty per Day:  1/2 pkt 2x daily (10-22-24 @ 11:54) [Active] Diet, NPO with Tube Feed:   Tube Feeding Modality: Jejunostomy  Call Loop Peptide 1.5 (KFPEPT1.5RTH)  Total Volume for 24 Hours (mL): 1920  Continuous  Starting Tube Feed Rate {mL per Hour}: 20  Increase Tube Feed Rate by (mL): 20     Every 4 hours  Until Goal Tube Feed Rate (mL per Hour): 80  Tube Feed Duration (in Hours): 24  Tube Feed Start Time: 23:00  Alfred(7 Gm Arginine/7 Gm Glut/1.2 Gm HMB     Qty per Day:  2  No Carb Prosource (1pkg = 15gms Protein)     Qty per Day:  3  Banatrol TF     Qty per Day:  1/2 pkt 2x daily (10-22-24 @ 11:54) [Active]    Diet, NPO with Tube Feed:   Tube Feeding Modality: Jejunostomy  Call Loop Peptide 1.5 (KFPEPT1.5RTH)  Total Volume for 24 Hours (mL): 960  Continuous  Until Goal Tube Feed Rate (mL per Hour): 80  Tube Feed Duration (in Hours): 12  Tube Feed Start Time: 06:00   Rate (mL per Hour): 70   Frequency: Every Hour  Free Water Flush Instructions:  20 cc q 1 hr to prevent j-tube from clogging  Alfred(7 Gm Arginine/7 Gm Glut/1.2 Gm HMB     Qty per Day:  1  No Carb Prosource (1pkg = 15gms Protein)     Qty per Day:  1  Banatrol TF     Qty per Day:  1/2 packet per daily (10-23-24 @ 15:45) [Active] Diet, NPO with Tube Feed:   Tube Feeding Modality: Jejunostomy  ALTO CINCO Peptide 1.5 (KFPEPT1.5RTH)  Total Volume for 24 Hours (mL): 960  Continuous  Until Goal Tube Feed Rate (mL per Hour): 80  Tube Feed Duration (in Hours): 12  Tube Feed Start Time: 06:00   Rate (mL per Hour): 70   Frequency: Every Hour  Free Water Flush Instructions:  20 cc q 1 hr to prevent j-tube from clogging  Alfred(7 Gm Arginine/7 Gm Glut/1.2 Gm HMB     Qty per Day:  1  No Carb Prosource (1pkg = 15gms Protein)     Qty per Day:  1  Banatrol TF     Qty per Day:  1/2 packet per daily (10-23-24 @ 15:45) [Active]

## 2024-10-23 NOTE — CHART NOTE - NSCHARTNOTEFT_GEN_A_CORE
NUTRITION FOLLOW UP NOTE    PATIENT SEEN FOR: follow up     SOURCE: [] Patient  [x] Current Medical Record  [x] Nursing  [] Family/support person at bedside  [x] Patient unavailable/inappropriate  [x] Other: NP    CHART REVIEWED/EVENTS NOTED.  [] No changes to nutrition care plan to note  [x] Nutrition Status:  -PMH T2DM  -s/p trach (vent dependent) and chronic PEG 2022  -s/p G-J tube exchange by GI on 10/21. J tube for meds and feeds per team    DIET ORDER:   Diet, NPO with Tube Feed:   Tube Feeding Modality: Jejunostomy  [employer] Peptide 1.5 (KFPEPT1.5RTH)  Total Volume for 24 Hours (mL): 1440  Continuous  Until Goal Tube Feed Rate (mL per Hour): 80  Tube Feed Duration (in Hours): 18  Tube Feed Start Time: 20:00   Rate (mL per Hour): 70   Frequency: Every Hour  Free Water Flush Instructions:  20 cc q 1 hr to prevent j-tube from clogging  Alfred(7 Gm Arginine/7 Gm Glut/1.2 Gm HMB     Qty per Day:  2  No Carb Prosource (1pkg = 15gms Protein)     Qty per Day:  3  Banatrol TF     Qty per Day:  1/2 packet 2 x daily (10-23-24)    CURRENT DIET ORDER IS:  [] Appropriate:  [] Inadequate:  [x] Other: see below for recommendations     NUTRITION INTAKE/PROVISION:  [] PO:  [x] Enteral Nutrition: [employer] Peptide 1.5 + Alfred BID + No carb Prosource TID + Banatrol providing at 100% provision: 2707kcal and 158g protein  [] Parenteral Nutrition:    ANTHROPOMETRICS:  Drug Dosing Weight  Height (cm): 149.9 (21 Oct 2024 18:49)  Weight (kg): 54.4 (21 Oct 2024 18:49)  BMI (kg/m2): 24.2 (21 Oct 2024 18:49)  BSA (m2): 1.48 (21 Oct 2024 18:49)  10/16 62.5kg - edema noted  RD will continue to monitor trends.     MEDICATIONS:  MEDICATIONS  (STANDING):  acetaminophen   Oral Liquid .. 650 milliGRAM(s) Enteral Tube every 8 hours  albuterol/ipratropium for Nebulization 3 milliLiter(s) Nebulizer every 6 hours  artificial  tears Solution 1 Drop(s) Both EYES every 6 hours  ascorbic acid 500 milliGRAM(s) Oral daily  Biotene Dry Mouth Oral Rinse 5 milliLiter(s) Swish and Spit every 6 hours  calcium carbonate 1250 mG  + Vitamin D (OsCal 500 + D) 1 Tablet(s) Oral <User Schedule>  chlorhexidine 0.12% Liquid 15 milliLiter(s) Oral Mucosa every 12 hours  chlorhexidine 4% Liquid 1 Application(s) Topical daily  dextrose 5%. 1000 milliLiter(s) (100 mL/Hr) IV Continuous <Continuous>  dextrose 5%. 1000 milliLiter(s) (50 mL/Hr) IV Continuous <Continuous>  dextrose 50% Injectable 12.5 Gram(s) IV Push once  dextrose 50% Injectable 25 Gram(s) IV Push once  diclofenac sodium 1% Gel 2 Gram(s) Topical every 6 hours  diphenhydrAMINE Injectable 12.5 milliGRAM(s) IV Push <User Schedule>  escitalopram 10 milliGRAM(s) Oral <User Schedule>  fentaNYL   Patch  12 MICROgram(s)/Hr 1 Patch Transdermal every 72 hours  fentaNYL   Patch  25 MICROgram(s)/Hr 1 Patch Transdermal every 72 hours  furosemide   Injectable 20 milliGRAM(s) IV Push once  gabapentin Solution 250 milliGRAM(s) Oral <User Schedule>  glucagon  Injectable 1 milliGRAM(s) IntraMuscular once  hemorrhoidal Ointment 1 Application(s) Rectal at bedtime  influenza  Vaccine (HIGH DOSE) 0.5 milliLiter(s) IntraMuscular once  insulin glargine Injectable (LANTUS) 24 Unit(s) SubCutaneous <User Schedule>  insulin lispro (ADMELOG) corrective regimen sliding scale   SubCutaneous every 6 hours  insulin lispro Injectable (ADMELOG) 12 Unit(s) SubCutaneous every 6 hours  lactobacillus acidophilus 1 Tablet(s) Oral <User Schedule>  levothyroxine 125 MICROGram(s) Oral daily  lidocaine   4% Patch 1 Patch Transdermal every 24 hours  melatonin Liquid 12 milliGRAM(s) Oral <User Schedule>  nystatin Powder 1 Application(s) Topical every 8 hours  pantoprazole  Injectable 40 milliGRAM(s) IV Push every 12 hours  potassium phosphate IVPB 30 milliMole(s) IV Intermittent once  prednisoLONE acetate 1% Suspension 1 Drop(s) Both EYES every 6 hours  predniSONE   Tablet 5 milliGRAM(s) Oral <User Schedule>  QUEtiapine 12.5 milliGRAM(s) Oral <User Schedule>  simethicone 160 milliGRAM(s) Chew <User Schedule>  sodium chloride 1 Gram(s) Oral every 12 hours  sodium chloride 0.65% Nasal 1 Spray(s) Both Nostrils every 6 hours  witch hazel Pads 1 Application(s) Topical every 12 hours    NUTRITIONALLY PERTINENT LABS:  10-23 Na139 mmol/L Glu 326 mg/dL[H] K+ 3.9 mmol/L Cr  <0.30 mg/dL[L] BUN 13 mg/dL 10-23 Phos 1.2 mg/dL[L]  10-14-24 @ 05:08 a1c 5.8    A1C with Estimated Average Glucose Result: 5.8 % (10-14-24 @ 05:08)  A1C with Estimated Average Glucose Result: 6.4 % (08-12-24 @ 07:32)    Finger Sticks:  POCT Blood Glucose.: 330 mg/dL (10-23 @ 05:44)  POCT Blood Glucose.: 317 mg/dL (10-22 @ 23:01)  POCT Blood Glucose.: 285 mg/dL (10-22 @ 17:29)  POCT Blood Glucose.: 191 mg/dL (10-22 @ 12:07)    NUTRITIONALLY PERTINENT MEDICATIONS/LABS:  [x] Reviewed  [x] Relevant notes on medications/labs:  -phosphorus low, being replenished     EDEMA:  [x] Reviewed  [] Relevant notes:    GI/ I&O:  [x] Reviewed  [] Relevant notes:  [x] Other: watery stool per flow sheets    SKIN:   sacrum ruth buttocks stage IV per flow sheets    ESTIMATED NEEDS:  Based on: dosing weight 54.4kg   30-35kcal/kg 1632-1904kcal/day  1.4-1.8g/kg 76-98g protein/day  defer fluid needs to team     NUTRITION DIAGNOSIS:  [x] Prior Dx: increased nutrient needs  [] New Dx:    EDUCATION:  [] Yes:  [x] Not appropriate/warranted  10/15/24 Nutrition Chart Note: Spoke with pt daughter Marysol Spann over phone ([phone]) to confirm pt home tube feeding regimen. Per daughter, pt receiving [employer] Peptide 1.5, 3 bottles/day. 80ml/hr x 12 hours.     NUTRITION CARE PLAN:  1. Diet: Update EN regimen to better align with pt nutritional needs. Consider [employer] Peptide 80ml/hr x 12 hours + Prosource TF Free daily + Alfred BID + Banatrol Daily. This will provide a total volume of 960ml total volume, 1788kcal (33kcal/kg) and 92g protein (1.7g/kg)  -->Defer free water flush to team   2. Continue vitamin/mineral regimen as ordered per team     [] Achieved - Continue current nutrition intervention(s)  [] Current medical condition precludes nutrition intervention at this time.    MONITORING AND EVALUATION:   RD remains available upon request and will follow up per protocol.    Rufina Morris, MS, RD, CDN / Teams

## 2024-10-23 NOTE — CHART NOTE - NSCHARTNOTEFT_GEN_A_CORE
Nutrition  Chart Note    Met with patients daughter Marysol Spann, RN  and RN at pt bedside. Writer discussed pt current tube feeding regimen with daughter and team.     Daughter made aware that RD remains available.     Rufina Morris MS, RD, CDN / Teams

## 2024-10-23 NOTE — PROGRESS NOTE ADULT - ASSESSMENT
73 yo F PMH T2DM on insulin, HTN, HLD, hypothyroidism, RA on Prednisone, Fibromyalgia, cerebral aneurysm s/p repair, chronic hypercapnic respiratory failure s/p trach (vent dependent) and chronic PEG 2022, recurrent C. diff infection (follows with Dr. 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 S/p antibiotics and s/p GJ tube exchanges on 10/14, s/p PEG replacement 10/21. Endocrine consulted for Type 2 DM management while on tube feeding. Patient is doing well, now at goal TF rate and tolerating TFs well. Continues on oral Prednisone 5mg once daily. Noted FBG elevated to 300s and hyperglycemia overnight >300mg/dL given TFs at goal. Will restart Admelog q6h while on TFs as daughter would like patient to not try NPH since patient did not tolerate in the past- home regimen is basal/bolus with bolus feeds at home. No hypoglycemia. Noted FBG elevated- will adjust Lantus dose and change to 1800 for q6h BG checks on cont 24hr TFs. Endocrine will closely monitor BG and adjust insulin as needed for BG goal 100-180mg/dL inpatient.      - Home DM medications: Lantus 35 units at HS, Humalog 26 units with # 1 feeding, 22 units with #2 tube feeding, 20 units with #3 tube feeding and  Humalog correction scale (  2 units for -668, 4 units for -250, 6 units for -300, 8units for -350, 10 units for -400, 12 units for -450)   while on [employer] formula 3 cans/day, slow bolus( run at 80 ml/hr total volume 960 ml/day> 1st can around 6:30-8:30, 2nd can around 3 pm, 3rd can around 8-9 pm) plus Banatrol 1/2 packet BID, was increasing to 1 packet BID, plus Kefir 10 oz/day ( divided in multiple times throughout the day).                         75 yo F PMH T2DM on insulin, HTN, HLD, hypothyroidism, RA on Prednisone, Fibromyalgia, cerebral aneurysm s/p repair, chronic hypercapnic respiratory failure s/p trach (vent dependent) and chronic PEG 2022, recurrent C. diff infection (follows with Dr. 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 S/p antibiotics and s/p GJ tube exchanges on 10/14, s/p PEG replacement 10/21. Endocrine consulted for Type 2 DM management while on tube feeding. Patient is doing well, now at goal TF rate and tolerating TFs well. Discussed with primary team and per RD recs, patient to be transitioned to 12hr TFs, agree with 6A-6P for better BG monitoring and insulin management. Continues on oral Prednisone 5mg once daily. Noted FBG elevated to 300s and hyperglycemia overnight >300mg/dL given TFs at goal. Will restart mealtime insulin while on TFs as daughter would like patient to not try NPH since patient did not tolerate in the past- home regimen is basal/bolus with bolus feeds at home. No hypoglycemia. Noted FBG elevated- will adjust Lantus dose and change to 1800 for q6h BG checks on cont 24hr TFs. Endocrine will closely monitor BG and adjust insulin as needed for BG goal 100-180mg/dL inpatient.      - Home DM medications: Lantus 35 units at HS, Humalog 26 units with # 1 feeding, 22 units with #2 tube feeding, 20 units with #3 tube feeding and  Humalog correction scale (  2 units for -954, 4 units for -250, 6 units for -300, 8units for -350, 10 units for -400, 12 units for -450)   while on [employer] formula 3 cans/day, slow bolus( run at 80 ml/hr total volume 960 ml/day> 1st can around 6:30-8:30, 2nd can around 3 pm, 3rd can around 8-9 pm) plus Banatrol 1/2 packet BID, was increasing to 1 packet BID, plus Kefir 10 oz/day ( divided in multiple times throughout the day).                         Current every day smoker

## 2024-10-23 NOTE — PROGRESS NOTE ADULT - ASSESSMENT
73 yo F PMH T2DM on insulin, HTN, HLD, hypothyroidism, RA on Prednisone, Fibromyalgia, cerebral aneurysm s/p repair, chronic hypercapnic respiratory failure s/p trach (vent dependent) and chronic PEG 2022, recurrent C. diff infection (follows with Dr. 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  Pt also noted to have rectal bleeding this past week requiring transfusion 5 days ago in ED as per daughter. Daughter also reports brownish discharge from G-J tube. In ED, pt afebrile, fiO2 96-98% on 40% on ventilator.  Chest X-ray w/ opacification of right lung concerning for possible PNA.  Admitted to [facility] for further management. Sputum cxs grew PSA; treated with course of Cefepime. Patient with decreased H+H received 1 unit of PRBCS on 10/10.  10/14 EGD w/ Dr. Rosales for PEGJ exchange and clippings placed for closure of fistula site.  Persistent fevers treated with meropenem and vanc (d/c'd 10/20).  CT A/P without infectious source.  Continue airway management with duonebs, chest PT and suction PRN.        10/22: Continue meropenem until 10/24 as per Dr. Newman.  S/P PEGJ with Dr. Rosales, enteral feeds restarted and tolerating well. 73 yo F PMH T2DM on insulin, HTN, HLD, hypothyroidism, RA on Prednisone, Fibromyalgia, cerebral aneurysm s/p repair, chronic hypercapnic respiratory failure s/p trach (vent dependent) and chronic PEG 2022, recurrent C. diff infection (follows with Dr. 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  Pt also noted to have rectal bleeding this past week requiring transfusion 5 days ago in ED as per daughter. Daughter also reports brownish discharge from G-J tube. In ED, pt afebrile, fiO2 96-98% on 40% on ventilator.  Chest X-ray w/ opacification of right lung concerning for possible PNA.  Admitted to [facility]CU for further management. Sputum cxs grew PSA; treated with course of Cefepime. Patient with decreased H+H received 1 unit of PRBCS on 10/10.  10/14 EGD w/ Dr. Rosales for PEGJ exchange and clippings placed for closure of fistula site.  Persistent fevers treated with meropenem and vanc (d/c'd 10/20).  CT A/P without infectious source.  Continue airway management with duonebs, chest PT and suction PRN.        10/23:  Feeds and insulin adjusted (uncontrolled BG levels) as per multidisciplinary team discussions involving nutrition, endocrine, nursing, management - final recs are ordered.  Increase in diarrhea - rectal tube placed, meropenem d/c'd.  Unable to sent cdiff specimen per hospital criteria and protocols - pt remains afebrile without leukocytosis.  Will continue to monitor and send specimen if needed.  Sent GI PCR.  Patient improving, discharge planning.  Marysol aware, updated and involved in POC.  Repleted electrolytes.

## 2024-10-23 NOTE — PROGRESS NOTE ADULT - ASSESSMENT
75 yo F PMH T2DM on insulin, HTN, HLD, hypothyroidism, RA on Prednisone, Fibromyalgia, cerebral aneurysm s/p repair, chronic hypercapnic respiratory failure s/p trach (vent dependent) and chronic PEG 2022, recurrent C. diff infection , persistent GJ tube leaks now s/p GC fistula repair 8/12  admitted 10/7/24 with resp distress and found to have RML opacity     Antibiotics  Ceftriaxone 10/7  Azithro 10/7  Cefepime 10/7--> 10/12  meropenem 10/15 --> 10/23  IV Vanco 10/17 --> 10/21      10/10 melena, anemia, blood tx  10/14 EGD for GJ exchange and piror PEG site closure  One benign-appearing, intrinsic moderate stenosis was found in the upper third of the        esophagus. The stenosis was traversed.       The cardia and gastric fundus were normal.       A gastric tube with tip in the jejunum was found in the gastric body. The PEG required        removal because it was leaking. The J tube extension (12F) was removed first. An externally        removable 24 Fr EndoVive Safety gastrostomy tube was lubricated. The guide wire was passed        through the existing G-tube port and snared endoscopically. The endoscope and snare were then        removed, pulling the wire out through the mouth. The g-tube was tied to the guidewire, pulled        through the mouth into the stomach and then pulled out from the stomach through the skin. The        bumper was attached to the gastrostomy tube. The feeding tube was then cut to an appropriate        length. The final position of the gastrostomy tube was confirmed by relook endoscopy, and        skin marking noted to be 3 cm at the external bumper. The final tension and compression of        the abdominal wall by the PEG tube and external bumper were checked and revealed that the        bumper was moderately tight and mildly deforming the skin. The J tube extension (12 F        EndoVive Jejunal feeding tube) was advanced into the stomach. The tip of the J tube had a        loop thread that was captured with a Resolution clip. The thread and the J tube extension        were advanced to the proximal jejunum, and the endoclip along with the tip of the J tube was        attached to the wall securely. The J tube extension was capped, and the tube site was cleaned        and dressed.       A small fistula was found on the anterior wall of the gastric body related to prior G tube        site. Coagulation of tissue near the fistula using argon plasma at 1.2 liters/minute and 35        peraza was successful. To closure the gastrocutaneous fistula, four hemostatic clips were        successfully placed (MR conditional, two 16 mm DuraClip and 2 Mantis clips.       The examined duodenum was normal.    10/14, 10/15 Fever, transient hypoxia post procedure  10/15 ProCalcitonin = 8.48 suggestive of bacterial process; BCx x2 NGTD; Trach: Pseudomonas   - though benign mg stain  10;16 Leukocytosis WBC = 14.26  10/17 fever, hypotension, abd distension, no diarrhea noted this am WBC = 15.02; Rising Procalcitonin = 38.49; Obstructed J tube   10/18  lost IV access re-gained  - CT scan late this afternoon  WBC = 8.68; Rising Procalcitonin >100  10/18 imaging suggests multifocal pneumonia  10/21 improved chest exam, Procalcitonin level considerably decreased, decreased FiO2  - afebrile since 10/18  10/21 UGI endoscopy done  Findings:       The esophagus was normal. A fistula was found on the anterior wall of the gastric body.       There was evidence of a gastrostomy present in the gastric body. The patient was placed in        the supine position. The endoscope was advanced to the jejunum and the prior placed J tube        with loop attached to the wall and the loop thread was cut by endoclip. The J tube and the G        tube were removed. The gastrostomy fistula was utilized and an Avanos 22 F        Gastrostomy/Jejunal tube was advanced. The jejunal tip was advanced through the pylorus and        into the duodenum. The endoscope was then advanced to the duodenum and the loop thread at the        tip of the J tube was captured and advanced to the proximal jejunum. The loop thread was        clipped to wall by a [employer]vium Resolution clip. The J tube flushed easily and the G        tube decompress the abdomen easily. The internal balloon was insufflated with 10 cc of water        to hold the GJ tube in place. The outside marking was at 3.  10/23  no distress, liquid stools noted  - Meropenem discontinued      Suggest  STOP Meropenem   if liquid stools persist, then check C diff      discussed with pulmonary attending, IVAN

## 2024-10-23 NOTE — PROGRESS NOTE ADULT - SUBJECTIVE AND OBJECTIVE BOX
Patient is a 74y old  Female who presents with a chief complaint of Patient admitted with Hypoxemia and episode of Bright red blood per rectum (22 Oct 2024 14:15)      Interval Events:    REVIEW OF SYSTEMS:  [ ] Positive  [ ] All other systems negative  [ ] Unable to assess ROS because ________    Vital Signs Last 24 Hrs  T(C): 36.5 (10-23-24 @ 05:19), Max: 36.8 (10-23-24 @ 00:07)  T(F): 97.7 (10-23-24 @ 05:19), Max: 98.2 (10-23-24 @ 00:07)  HR: 75 (10-23-24 @ 05:50) (73 - 98)  BP: 157/70 (10-23-24 @ 05:19) (138/62 - 158/68)  RR: 18 (10-23-24 @ 05:19) (18 - 18)  SpO2: 100% (10-23-24 @ 05:50) (99% - 102%)    PHYSICAL EXAM:  HEENT:   [ ]Tracheostomy:  [ ]Pupils equal  [ ]No oral lesions  [ ]Abnormal    SKIN  [ ]No Rash  [ ] Abnormal  [ ] pressure    CARDIAC  [ ]Regular  [ ]Abnormal    PULMONARY  [ ]Bilateral Clear Breath Sounds  [ ]Normal Excursion  [ ]Abnormal    GI  [ ]PEG      [ ] +BS		              [ ]Soft, nondistended, nontender	  [ ]Abnormal    MUSCULOSKELETAL                                   [ ]Bedbound                 [ ]Abnormal    [ ]Ambulatory/OOB to chair                           EXTREMITIES                                         [ ]Normal  [ ]Edema                           NEUROLOGIC  [ ] Normal, non focal  [ ] Focal findings:    PSYCHIATRIC  [ ]Alert and appropriate  [ ] Sedated	 [ ]Agitated    :  Mcbride: [ ] Yes, if yes: Date of Placement:                   [  ] No    LINES: Central Lines [ ] Yes, if yes: Date of Placement                                     [  ] No    HOSPITAL MEDICATIONS:  MEDICATIONS  (STANDING):  acetaminophen   Oral Liquid .. 650 milliGRAM(s) Enteral Tube every 8 hours  albuterol/ipratropium for Nebulization 3 milliLiter(s) Nebulizer every 6 hours  artificial  tears Solution 1 Drop(s) Both EYES every 6 hours  ascorbic acid 500 milliGRAM(s) Oral daily  Biotene Dry Mouth Oral Rinse 5 milliLiter(s) Swish and Spit every 6 hours  calcium carbonate 1250 mG  + Vitamin D (OsCal 500 + D) 1 Tablet(s) Oral <User Schedule>  chlorhexidine 0.12% Liquid 15 milliLiter(s) Oral Mucosa every 12 hours  chlorhexidine 4% Liquid 1 Application(s) Topical daily  dextrose 5%. 1000 milliLiter(s) (50 mL/Hr) IV Continuous <Continuous>  dextrose 5%. 1000 milliLiter(s) (100 mL/Hr) IV Continuous <Continuous>  dextrose 50% Injectable 25 Gram(s) IV Push once  dextrose 50% Injectable 12.5 Gram(s) IV Push once  diclofenac sodium 1% Gel 2 Gram(s) Topical every 6 hours  diphenhydrAMINE Injectable 12.5 milliGRAM(s) IV Push <User Schedule>  escitalopram 10 milliGRAM(s) Oral <User Schedule>  fentaNYL   Patch  12 MICROgram(s)/Hr 1 Patch Transdermal every 72 hours  fentaNYL   Patch  25 MICROgram(s)/Hr 1 Patch Transdermal every 72 hours  gabapentin Solution 250 milliGRAM(s) Oral <User Schedule>  glucagon  Injectable 1 milliGRAM(s) IntraMuscular once  hemorrhoidal Ointment 1 Application(s) Rectal at bedtime  influenza  Vaccine (HIGH DOSE) 0.5 milliLiter(s) IntraMuscular once  insulin glargine Injectable (LANTUS) 20 Unit(s) SubCutaneous at bedtime  insulin lispro (ADMELOG) corrective regimen sliding scale   SubCutaneous every 6 hours  lactobacillus acidophilus 1 Tablet(s) Oral <User Schedule>  levothyroxine 125 MICROGram(s) Oral daily  lidocaine   4% Patch 1 Patch Transdermal every 24 hours  melatonin Liquid 12 milliGRAM(s) Oral <User Schedule>  meropenem  IVPB 1000 milliGRAM(s) IV Intermittent every 8 hours  nystatin Powder 1 Application(s) Topical every 8 hours  pantoprazole  Injectable 40 milliGRAM(s) IV Push every 12 hours  prednisoLONE acetate 1% Suspension 1 Drop(s) Both EYES every 6 hours  predniSONE   Tablet 5 milliGRAM(s) Oral <User Schedule>  QUEtiapine 12.5 milliGRAM(s) Oral <User Schedule>  simethicone 160 milliGRAM(s) Chew <User Schedule>  sodium chloride 1 Gram(s) Oral every 12 hours  sodium chloride 0.65% Nasal 1 Spray(s) Both Nostrils every 6 hours  witch hazel Pads 1 Application(s) Topical every 12 hours    MEDICATIONS  (PRN):      LABS:                        8.0    2.76  )-----------( 124      ( 22 Oct 2024 07:00 )             27.2     10-23    139  |  103  |  13  ----------------------------<  326[H]  3.9   |  25  |  <0.30[L]    Ca    7.8[L]      23 Oct 2024 06:45  Phos  1.2     10-23  Mg     2.0     10-23        Urinalysis Basic - ( 23 Oct 2024 06:45 )    Color: x / Appearance: x / SG: x / pH: x  Gluc: 326 mg/dL / Ketone: x  / Bili: x / Urobili: x   Blood: x / Protein: x / Nitrite: x   Leuk Esterase: x / RBC: x / WBC x   Sq Epi: x / Non Sq Epi: x / Bacteria: x          CAPILLARY BLOOD GLUCOSE    MICROBIOLOGY:     RADIOLOGY:  [ ] Reviewed and interpreted by me    Mode: AC/ CMV (Assist Control/ Continuous Mandatory Ventilation)  RR (machine): 18  TV (machine): 350  FiO2: 30  PEEP: 5  ITime: 1  MAP: 11  PIP: 36   Patient is a 74y old  Female who presents with a chief complaint of Patient admitted with Hypoxemia and episode of Bright red blood per rectum (22 Oct 2024 14:15)    Interval Events:  No acute events overnight.     REVIEW OF SYSTEMS:  [ ] Positive  [X] All other systems negative  [ ] Unable to assess ROS because ________    Vital Signs Last 24 Hrs  T(C): 36.5 (10-23-24 @ 05:19), Max: 36.8 (10-23-24 @ 00:07)  T(F): 97.7 (10-23-24 @ 05:19), Max: 98.2 (10-23-24 @ 00:07)  HR: 75 (10-23-24 @ 05:50) (73 - 98)  BP: 157/70 (10-23-24 @ 05:19) (138/62 - 158/68)  RR: 18 (10-23-24 @ 05:19) (18 - 18)  SpO2: 100% (10-23-24 @ 05:50) (99% - 102%)    PHYSICAL EXAM:  HEENT:   [X]Tracheostomy: #8 XLT shiley  [X]Pupils equal  [ ]No oral lesions  [ ]Abnormal    SKIN  [ ]No Rash  [X] Abnormal - IAD, MAD at old PEG site  [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    CARDIAC  [X]Regular  [ ]Abnormal    PULMONARY  [X]Bilateral Clear Breath Sounds  [ ]Normal Excursion  [ ]Abnormal    GI  [X]PEGJ      [X] +BS		              [X]Soft, nondistended, nontender	  [X]Abnormal - old PEGJ site c/d/i    MUSCULOSKELETAL                                   [X]Bedbound                 [ ]Abnormal    [ ]Ambulatory/OOB to chair                           EXTREMITIES                                         [ ]Normal  [X]Edema - generalized edema                        NEUROLOGIC  [ ] Normal, non focal  [X] Focal findings: awake, alert, following commands on all 4 extremities     PSYCHIATRIC  [X] Alert and appropriate  [ ] Sedated	 [ ]Agitated    :  Mcbride: [ ] Yes, if yes: Date of Placement:                   [X] No    LINES: Central Lines [ ] Yes, if yes: Date of Placement                                     [X] No    HOSPITAL MEDICATIONS:  MEDICATIONS  (STANDING):  acetaminophen   Oral Liquid .. 650 milliGRAM(s) Enteral Tube every 8 hours  albuterol/ipratropium for Nebulization 3 milliLiter(s) Nebulizer every 6 hours  artificial  tears Solution 1 Drop(s) Both EYES every 6 hours  ascorbic acid 500 milliGRAM(s) Oral daily  Biotene Dry Mouth Oral Rinse 5 milliLiter(s) Swish and Spit every 6 hours  calcium carbonate 1250 mG  + Vitamin D (OsCal 500 + D) 1 Tablet(s) Oral <User Schedule>  chlorhexidine 0.12% Liquid 15 milliLiter(s) Oral Mucosa every 12 hours  chlorhexidine 4% Liquid 1 Application(s) Topical daily  dextrose 5%. 1000 milliLiter(s) (50 mL/Hr) IV Continuous <Continuous>  dextrose 5%. 1000 milliLiter(s) (100 mL/Hr) IV Continuous <Continuous>  dextrose 50% Injectable 25 Gram(s) IV Push once  dextrose 50% Injectable 12.5 Gram(s) IV Push once  diclofenac sodium 1% Gel 2 Gram(s) Topical every 6 hours  diphenhydrAMINE Injectable 12.5 milliGRAM(s) IV Push <User Schedule>  escitalopram 10 milliGRAM(s) Oral <User Schedule>  fentaNYL   Patch  12 MICROgram(s)/Hr 1 Patch Transdermal every 72 hours  fentaNYL   Patch  25 MICROgram(s)/Hr 1 Patch Transdermal every 72 hours  gabapentin Solution 250 milliGRAM(s) Oral <User Schedule>  glucagon  Injectable 1 milliGRAM(s) IntraMuscular once  hemorrhoidal Ointment 1 Application(s) Rectal at bedtime  influenza  Vaccine (HIGH DOSE) 0.5 milliLiter(s) IntraMuscular once  insulin glargine Injectable (LANTUS) 20 Unit(s) SubCutaneous at bedtime  insulin lispro (ADMELOG) corrective regimen sliding scale   SubCutaneous every 6 hours  lactobacillus acidophilus 1 Tablet(s) Oral <User Schedule>  levothyroxine 125 MICROGram(s) Oral daily  lidocaine   4% Patch 1 Patch Transdermal every 24 hours  melatonin Liquid 12 milliGRAM(s) Oral <User Schedule>  meropenem  IVPB 1000 milliGRAM(s) IV Intermittent every 8 hours  nystatin Powder 1 Application(s) Topical every 8 hours  pantoprazole  Injectable 40 milliGRAM(s) IV Push every 12 hours  prednisoLONE acetate 1% Suspension 1 Drop(s) Both EYES every 6 hours  predniSONE   Tablet 5 milliGRAM(s) Oral <User Schedule>  QUEtiapine 12.5 milliGRAM(s) Oral <User Schedule>  simethicone 160 milliGRAM(s) Chew <User Schedule>  sodium chloride 1 Gram(s) Oral every 12 hours  sodium chloride 0.65% Nasal 1 Spray(s) Both Nostrils every 6 hours  witch hazel Pads 1 Application(s) Topical every 12 hours    MEDICATIONS  (PRN):    LABS:                        8.0    2.76  )-----------( 124      ( 22 Oct 2024 07:00 )             27.2     10-23    139  |  103  |  13  ----------------------------<  326[H]  3.9   |  25  |  <0.30[L]    Ca    7.8[L]      23 Oct 2024 06:45  Phos  1.2     10-23  Mg     2.0     10-23    Urinalysis Basic - ( 23 Oct 2024 06:45 )    Color: x / Appearance: x / SG: x / pH: x  Gluc: 326 mg/dL / Ketone: x  / Bili: x / Urobili: x   Blood: x / Protein: x / Nitrite: x   Leuk Esterase: x / RBC: x / WBC x   Sq Epi: x / Non Sq Epi: x / Bacteria: x    CAPILLARY BLOOD GLUCOSE    MICROBIOLOGY:     RADIOLOGY:  [ ] Reviewed and interpreted by me    Mode: AC/ CMV (Assist Control/ Continuous Mandatory Ventilation)  RR (machine): 18  TV (machine): 350  FiO2: 30  PEEP: 5  ITime: 1  MAP: 11  PIP: 36

## 2024-10-23 NOTE — PROGRESS NOTE ADULT - PROBLEM SELECTOR PLAN 1
Inpatient Plan:  - Please monitor blood glucose values q 6 hours while on tube feeding or NPO  - Increase Lantus to 24 units at 1800  - Start Admelog 12u q6h while on TFs (HOLD if TFs held)  - C/w moderate dose Admelog correctional scale q6h while on TFs/NPO    Discharge Plan:  - TBD depending on insulin requirement and discharge tube feeding regimen (Bolus vs continuous tube feeding)   - If bolus tube feeding > Lantus plus Humalog   - Patient should have BGs checked 4x a day while on TFs. Please tell patient to contact Endocrinologist if BG <70mg/dL x1 or >200mg/dL consistently or >400mg/dL x1.   - Followup with Dr Ruth: Endocrinology [facility]: [address] [place] [address]. [address]. [place] [address]. Tel: [phone]- Telemedicine- daughter to schedule.     Pt will need RX for basal insulin pen (ie. Basaglar, Lantus, Tresiba, Toujeo, Levemir) and bolus insulin pen (ie. humalog/novolog/admelog) depending on insurance coverage; please send test scripts to see which is covered. Please send prescriptions for diabetes supplies (glucometer, test strips, lancets, alcohol swabs, insulin pen needles). Inpatient Plan:  - Please monitor blood glucose values q 6 hours while on tube feeding or NPO  - Increase Lantus to 25 units at 1800  - Start Admelog 22u at 0600, 1200, and 1800 while on TFs (HOLD if tube feeds held)  - C/w moderate dose Admelog correctional scale q6h while on TFs/NPO/overnight     Discharge Plan:  - TBD depending on insulin requirement and discharge tube feeding regimen (Bolus vs continuous tube feeding)   - If bolus tube feeding > Lantus plus Humalog   - Patient should have BGs checked 4x a day while on TFs. Please tell patient to contact Endocrinologist if BG <70mg/dL x1 or >200mg/dL consistently or >400mg/dL x1.   - Followup with Dr Ruth: Endocrinology [facility]: [address] [place] [address]. [address]. [place] [address]. Tel: [phone] [id number]- daughter to schedule.     Pt will need RX for basal insulin pen (ie. Basaglar, Lantus, Tresiba, Toujeo, Levemir) and bolus insulin pen (ie. humalog/novolog/admelog) depending on insurance coverage; please send test scripts to see which is covered. Please send prescriptions for diabetes supplies (glucometer, test strips, lancets, alcohol swabs, insulin pen needles). Inpatient Plan:  - Please monitor blood glucose values q 6 hours while on tube feeding or NPO  - Increase Lantus to 25 units at 1800  - Start Admelog 22u at 0600 and 1200 while on TFs (HOLD if tube feeds held)  - C/w moderate dose Admelog correctional scale q6h while on TFs/NPO/overnight     Discharge Plan:  - TBD depending on insulin requirement and discharge tube feeding regimen (Bolus vs continuous tube feeding)   - If bolus tube feeding > Lantus plus Humalog   - Patient should have BGs checked 4x a day while on TFs. Please tell patient to contact Endocrinologist if BG <70mg/dL x1 or >200mg/dL consistently or >400mg/dL x1.   - Followup with Dr Ruth: Endocrinology [facility]: [address] [place] [address]. [address]. [place] [address]. Tel: [phone]- Telemedicine- daughter to schedule.     Pt will need RX for basal insulin pen (ie. Basaglar, Lantus, Tresiba, Toujeo, Levemir) and bolus insulin pen (ie. humalog/novolog/admelog) depending on insurance coverage; please send test scripts to see which is covered. Please send prescriptions for diabetes supplies (glucometer, test strips, lancets, alcohol swabs, insulin pen needles).

## 2024-10-23 NOTE — PROGRESS NOTE ADULT - SUBJECTIVE AND OBJECTIVE BOX
Follow Up:  pneumonia    Interval History/ROS:  liquid stools today,  notes some abd discomfort    Allergies  walnut (Unknown)  metronidazole (Rash)  Lyrica (Unknown)  penicillin (Unknown)  Pineapple (Unknown)  Tagamet (Unknown)  heparin (Unknown)  Pecans (Unknown)  Hazelnut (Unknown)  meropenem (Rash)        ANTIMICROBIALS:      OTHER MEDS:  MEDICATIONS  (STANDING):  acetaminophen   IVPB .. 1000 once  acetaminophen   Oral Liquid .. 650 every 8 hours  albuterol/ipratropium for Nebulization 3 every 6 hours  dextrose 50% Injectable 25 once  dextrose 50% Injectable 12.5 once  escitalopram 10 <User Schedule>  fentaNYL   Patch  12 MICROgram(s)/Hr 1 every 72 hours  fentaNYL   Patch  25 MICROgram(s)/Hr 1 every 72 hours  gabapentin Solution 250 <User Schedule>  glucagon  Injectable 1 once  influenza  Vaccine (HIGH DOSE) 0.5 once  insulin glargine Injectable (LANTUS) 25 <User Schedule>  insulin lispro (ADMELOG) corrective regimen sliding scale  every 6 hours  levothyroxine 125 daily  melatonin Liquid 12 <User Schedule>  pantoprazole  Injectable 40 every 12 hours  predniSONE   Tablet 5 <User Schedule>  QUEtiapine 12.5 <User Schedule>  simethicone 160 <User Schedule>      Vital Signs Last 24 Hrs  T(C): 36.4 (23 Oct 2024 12:14), Max: 36.8 (23 Oct 2024 00:07)  T(F): 97.5 (23 Oct 2024 12:14), Max: 98.2 (23 Oct 2024 00:07)  HR: 78 (23 Oct 2024 18:09) (73 - 86)  BP: 147/65 (23 Oct 2024 12:14) (147/65 - 158/68)  BP(mean): --  RR: 20 (23 Oct 2024 12:14) (18 - 20)  SpO2: 100% (23 Oct 2024 18:09) (100% - 100%)    Parameters below as of 23 Oct 2024 18:09  Patient On (Oxygen Delivery Method): ventilator        PHYSICAL EXAM:  General: WN/WD NAD, Non-toxic  Neurology: A&Ox3, nonfocal  Respiratory: Clear to auscultation bilaterally  CV: RRR, S1S2, no murmurs, rubs or gallops  Abdominal: distended, normal bowel sounds  Extremities: No edema  Line Sites: Clear  Skin: No rash                          8.1    3.82  )-----------( 143      ( 23 Oct 2024 06:45 )             28.4   WBC Count: 3.82 (10-23 @ 06:45)  WBC Count: 2.76 (10-22 @ 07:00)  WBC Count: 4.44 (10-21 @ 06:43)  WBC Count: 5.33 (10-20 @ 07:01)  WBC Count: 7.98 (10-19 @ 04:59)  WBC Count: 6.98 (10-19 @ 04:22)      10-23    139  |  103  |  13  ----------------------------<  326[H]  3.9   |  25  |  <0.30[L]    Ca    7.8[L]      23 Oct 2024 06:45  Phos  1.2     10-23  Mg     2.0     10-23      Urinalysis Basic - ( 23 Oct 2024 06:45 )    Color: x / Appearance: x / SG: x / pH: x  Gluc: 326 mg/dL / Ketone: x  / Bili: x / Urobili: x   Blood: x / Protein: x / Nitrite: x   Leuk Esterase: x / RBC: x / WBC x   Sq Epi: x / Non Sq Epi: x / Bacteria: x        MICROBIOLOGY:  .Blood BLOOD  10-17-24   No growth at 5 days  --  --      .Blood BLOOD  10-17-24   No growth at 5 days  --  --      .Blood BLOOD  10-15-24   No growth at 5 days  --  --      Trach Asp Tracheal Aspirate  10-15-24   Mixed gram negative rods including  Numerous Pseudomonas aeruginosa  Commensal vinay consistent with body site  --  Pseudomonas aeruginosa      .Blood BLOOD  10-15-24   No growth at 5 days  --  --      Trach Asp Tracheal Aspirate  10-08-24   Few Pseudomonas aeruginosa  Commensal vinay consistent with body site  --  Pseudomonas aeruginosa      Clean Catch Clean Catch (Midstream)  10-07-24   10,000 - 49,000 CFU/mL Escherichia coli ESBL  --  Escherichia coli ESBL      .Blood BLOOD  10-07-24   No growth at 5 days  --  --      .Blood BLOOD  10-07-24   No growth at 5 days  --  --      Clean Catch Clean Catch (Midstream)  10-03-24   Culture grew 3 or more types of organisms which indicate  collection contamination; consider recollection only if clinically  indicated.  --  --      RADIOLOGY:  < from: CT Chest w/ IV Cont (10.18.24 @ 17:18) >  IMPRESSION:    Multifocal pneumonia.    New small pleural effusions, and increased subcutaneous edema compared to   10/7/2024.    Gastrocutaneous fistula. No evidence of drainable fluid collection.    < end of copied text >      Zion Newman MD; Division of Infectious Disease; Pager: [phone]; nights and weekends: [phone]

## 2024-10-23 NOTE — PROGRESS NOTE ADULT - NS ATTEND AMEND GEN_ALL_CORE FT
-    74F PMH DM2 (insulin-dependent), HTN, HLD, hypothyroidism, RA on Prednisone, fibromyalgia, cerebral aneurysm s/p repair, chronic hypoxemia and hypercapnic respiratory failure s/p trach, vent-dependent, recurrent C diff infection, oropharyngeal dysphagia s/p G-J tube with persistent GJ tube leaks now s/p GC fistula repair 8/12, and recent presentation 5 days PTA for rectal bleeding requiring transfusion in the ED who now presents with acute hypoxemic respiratory distress 2/2 RML PNA, admitted to the RCU for further management. Found to have Pseudomonas aeruginosa in sputum culture and urine culture with ESBL E. coli. Course complicated by hypotension requiring IVF hydration with improvement.    # Neuro: awake and alert, but with critical illness polyneuropathy. Outpatient f/up for hearing loss. Continue escitalopram and quetiapine. Fentanyl patch for pain along with diclofenac gel prn. Continue gabapentin. Oxycodone prn  # CV: HD stable. Off midodrine. Hold off on diuresis today given hypokalemia/hypomagnesemia. Improved volume overload  # Pulm: continue lung protective ventilation. Pressure support trials as tolerates. Shallow respirations on pressure support 15/5 today. Continue Duonebs  # GI: s/p G-J tube exchange by GI on 10/21. Keep G port for decompression of G-C fistula and J tube for meds and feeds. Advance feeds to goal as tolerates. Continue PPI  # Renal: stable kidney function. Replete hypokalemia, hypomagnesemia, and hypophosphatemia  # ID: continue meropenem for Pseudomonas aeruginosa pneumonia and ESBL E. coli UTI until 10/24. Appreciate ID follow-up. CT A/P unremarkable. Chest CT consistent with multifocal pneumonia. Will need repeat CT chest in 6 weeks to assess for resolution. Wound care follow-up for sacral wound  # Heme: SCD's for DVT ppx  # Endo: DM2, continue insulin coverage scale + Lantus 15 qhs. Continue levothyroxine. Change hydrocortisone to home prednisone 5 mg daily  # Rheum: RA, restart prednisone 5 mg daily. Hold off on Enbrel at this time  # Dispo: full code, prognosis guarded, discussed with patient,  at bedside, and daughter Marysol by phone -    74F PMH DM2 (insulin-dependent), HTN, HLD, hypothyroidism, RA on Prednisone, fibromyalgia, cerebral aneurysm s/p repair, chronic hypoxemia and hypercapnic respiratory failure s/p trach, vent-dependent, recurrent C diff infection, oropharyngeal dysphagia s/p G-J tube with persistent GJ tube leaks now s/p GC fistula repair 8/12, and recent presentation 5 days PTA for rectal bleeding requiring transfusion in the ED who now presents with acute hypoxemic respiratory distress 2/2 RML PNA, admitted to the RCU for further management. Found to have Pseudomonas aeruginosa in sputum culture and urine culture with ESBL E. coli. Course complicated by hypotension requiring IVF hydration with improvement. Now with diarrhea.    # Neuro: awake and alert, but with critical illness polyneuropathy. Outpatient f/up for hearing loss. Continue escitalopram and quetiapine. Fentanyl patch for pain along with diclofenac gel prn. Continue gabapentin. Oxycodone prn  # CV: HD stable. Furosemide 20 mg IV today. Improved volume overload  # Pulm: continue lung protective ventilation. Pressure support trials as tolerates. Shallow respirations on pressure support 15/5 today. Continue Duonebs  # GI: s/p G-J tube exchange by GI on 10/21. Keep G port for decompression of G-C fistula and J tube for meds and feeds. Tolerating feeds. Today with diarrhea and mild abdominal pain, although without fevers or leukocytosis. Check GI PCR. D/c meropenem today. If diarrhea persists, will check C diff PCR tomorrow  # Renal: stable kidney function. Normal K and Mg today. Replete low phos  # ID: d/c meropenem today. Appreciate ID follow-up. CT A/P unremarkable. Chest CT consistent with multifocal pneumonia. Will need repeat CT chest in 6 weeks to assess for resolution. Wound care follow-up for sacral wound  # Heme: SCD's for DVT ppx  # Endo: DM2, continue insulin coverage scale + Lantus qhs. Continue levothyroxine. Continue prednisone 5 mg daily  # Rheum: RA, on home prednisone. Hold off on Enbrel at this time  # Dispo: full code, prognosis guarded, discussed with patient and daughter Marysol at bedside

## 2024-10-23 NOTE — PROGRESS NOTE ADULT - SUBJECTIVE AND OBJECTIVE BOX
Patient seen today for follow up inpatient Diabetes Mellitus management.    Chief Complaint: Type 2 Diabetes Mellitus     INTERVAL HX:  Patient seen in [facility] RCU1 505 W1. Patient is alert and appropriate, trached on vent. Patient now s/p PEG replacement on 24hr TFs [place] 1.5 @80cc/hr, goal rate, tolerating well. No hypoglycemia. Hyperglycemia overnight and FBG elevated this am to 330mg/dL, most likely 2/2 to TFs increased to goal rate. Discussed management with daughter at bedside- patient in the past did not tolerate NPH or regular insulin and would like mother to stay on basal/bolus for now- most likely will be d/c on home nutrition regimen which is not the same inpatient. Patient continues on oral Prednisone 5mg once daily Blood glucose levels in the last 24hrs have been 191-330mg/dL.     Review of Systems:  General: As above.  Respiratory: Denies any SOB, HEREDIA, or cough.  Gastrointestinal: Denies any n/v/d or abdominal pain.   Endocrine: Denies any polyuria, polydipsia, polyphagia, visual changes, or numbness in feet.     Allergies  walnut (Unknown)  metronidazole (Rash)  Lyrica (Unknown)  penicillin (Unknown)  Pineapple (Unknown)  Tagamet (Unknown)  heparin (Unknown)  Pecans (Unknown)  Hazelnut (Unknown)  meropenem (Rash)      Intolerances  None.       MEDICATIONS  (STANDING):  acetaminophen   Oral Liquid .. 650 milliGRAM(s) Enteral Tube every 8 hours  albuterol/ipratropium for Nebulization 3 milliLiter(s) Nebulizer every 6 hours  artificial  tears Solution 1 Drop(s) Both EYES every 6 hours  ascorbic acid 500 milliGRAM(s) Oral daily  Biotene Dry Mouth Oral Rinse 5 milliLiter(s) Swish and Spit every 6 hours  calcium carbonate 1250 mG  + Vitamin D (OsCal 500 + D) 1 Tablet(s) Oral <User Schedule>  chlorhexidine 0.12% Liquid 15 milliLiter(s) Oral Mucosa every 12 hours  chlorhexidine 4% Liquid 1 Application(s) Topical daily  dextrose 5%. 1000 milliLiter(s) IV Continuous <Continuous>  dextrose 5%. 1000 milliLiter(s) IV Continuous <Continuous>  dextrose 50% Injectable 25 Gram(s) IV Push once  dextrose 50% Injectable 12.5 Gram(s) IV Push once  diclofenac sodium 1% Gel 2 Gram(s) Topical every 6 hours  diphenhydrAMINE Injectable 12.5 milliGRAM(s) IV Push <User Schedule>  escitalopram 10 milliGRAM(s) Oral <User Schedule>  fentaNYL   Patch  12 MICROgram(s)/Hr 1 Patch Transdermal every 72 hours  fentaNYL   Patch  25 MICROgram(s)/Hr 1 Patch Transdermal every 72 hours  furosemide   Injectable 20 milliGRAM(s) IV Push once  gabapentin Solution 250 milliGRAM(s) Oral <User Schedule>  glucagon  Injectable 1 milliGRAM(s) IntraMuscular once  hemorrhoidal Ointment 1 Application(s) Rectal at bedtime  influenza  Vaccine (HIGH DOSE) 0.5 milliLiter(s) IntraMuscular once  insulin glargine Injectable (LANTUS) 24 Unit(s) SubCutaneous <User Schedule>  insulin lispro (ADMELOG) corrective regimen sliding scale   SubCutaneous every 6 hours  insulin lispro Injectable (ADMELOG) 12 Unit(s) SubCutaneous every 6 hours  lactobacillus acidophilus 1 Tablet(s) Oral <User Schedule>  levothyroxine 125 MICROGram(s) Oral daily  lidocaine   4% Patch 1 Patch Transdermal every 24 hours  melatonin Liquid 12 milliGRAM(s) Oral <User Schedule>  meropenem  IVPB 1000 milliGRAM(s) IV Intermittent every 8 hours  nystatin Powder 1 Application(s) Topical every 8 hours  pantoprazole  Injectable 40 milliGRAM(s) IV Push every 12 hours  potassium phosphate IVPB 30 milliMole(s) IV Intermittent once  prednisoLONE acetate 1% Suspension 1 Drop(s) Both EYES every 6 hours  predniSONE   Tablet 5 milliGRAM(s) Oral <User Schedule>  QUEtiapine 12.5 milliGRAM(s) Oral <User Schedule>  simethicone 160 milliGRAM(s) Chew <User Schedule>  sodium chloride 1 Gram(s) Oral every 12 hours  sodium chloride 0.65% Nasal 1 Spray(s) Both Nostrils every 6 hours  witch hazel Pads 1 Application(s) Topical every 12 hours      dextrose 50% Injectable 25 Gram(s) IV Push once  dextrose 50% Injectable 12.5 Gram(s) IV Push once  glucagon  Injectable 1 milliGRAM(s) IntraMuscular once  insulin glargine Injectable (LANTUS) 24 Unit(s) SubCutaneous <User Schedule>  insulin lispro (ADMELOG) corrective regimen sliding scale   SubCutaneous every 6 hours  insulin lispro Injectable (ADMELOG) 12 Unit(s) SubCutaneous every 6 hours  levothyroxine 125 MICROGram(s) Oral daily  predniSONE   Tablet 5 milliGRAM(s) Oral <User Schedule>      insulin lispro (ADMELOG) corrective regimen sliding scale   SubCutaneous every 6 hours  insulin lispro Injectable (ADMELOG) 12 Unit(s) SubCutaneous every 6 hours      PHYSICAL EXAM:  VITALS:   T(C): 36.5 (10-23-24 @ 05:19), Max: 36.8 (10-23-24 @ 00:07)  HR: 82 (10-23-24 @ 08:28) (73 - 93)  BP: 157/70 (10-23-24 @ 05:19) (138/62 - 158/68)  RR: 18 (10-23-24 @ 05:19) (18 - 18)  SpO2: 100% (10-23-24 @ 08:28) (99% - 100%)    GENERAL: In no acute distress  Respiratory: Respirations unlabored, trached on ventilator  Extremities: Warm and dry, no edema  NEURO: Alert, appropriate, follows commands and mouths words    LABS:  POCT Blood Glucose.: 330 mg/dL (10-23-24 @ 05:44)  POCT Blood Glucose.: 317 mg/dL (10-22-24 @ 23:01)  POCT Blood Glucose.: 285 mg/dL (10-22-24 @ 17:29)  POCT Blood Glucose.: 191 mg/dL (10-22-24 @ 12:07)  POCT Blood Glucose.: 223 mg/dL (10-22-24 @ 05:50)  POCT Blood Glucose.: 192 mg/dL (10-21-24 @ 20:53)  POCT Blood Glucose.: 252 mg/dL (10-21-24 @ 17:26)  POCT Blood Glucose.: 206 mg/dL (10-21-24 @ 11:20)  POCT Blood Glucose.: 172 mg/dL (10-21-24 @ 06:03)  POCT Blood Glucose.: 163 mg/dL (10-21-24 @ 00:06)  POCT Blood Glucose.: 182 mg/dL (10-20-24 @ 18:44)  POCT Blood Glucose.: 160 mg/dL (10-20-24 @ 12:07)                          8.1    3.82  )-----------( 143      ( 23 Oct 2024 06:45 )             28.4     10-23    139  |  103  |  13  ----------------------------<  326[H]  3.9   |  25  |  <0.30[L]    Ca    7.8[L]      23 Oct 2024 06:45  Phos  1.2     10-23  Mg     2.0     10-23      Urinalysis Basic - ( 23 Oct 2024 06:45 )    Color: x / Appearance: x / SG: x / pH: x  Gluc: 326 mg/dL / Ketone: x  / Bili: x / Urobili: x   Blood: x / Protein: x / Nitrite: x   Leuk Esterase: x / RBC: x / WBC x   Sq Epi: x / Non Sq Epi: x / Bacteria: x        A1C with Estimated Average Glucose Result: A1C with Estimated Average Glucose Result: 5.8 % (10-14-24 @ 05:08)  A1C with Estimated Average Glucose Result: 6.4 % (08-12-24 @ 07:32)       Patient seen today for follow up inpatient Diabetes Mellitus management.    Chief Complaint: Type 2 Diabetes Mellitus     INTERVAL HX:  Patient seen in [facility] RCU1 505 W1. Patient is alert and appropriate, trached on vent. Patient now s/p PEG replacement on 24hr TFs [place] 1.5 @80cc/hr, goal rate, tolerating well. RD consult done- patient to be transitioned to 12hr TFs. No hypoglycemia. Hyperglycemia overnight and FBG elevated this am to 330mg/dL, most likely 2/2 to TFs increased to goal rate. Discussed management with daughter at bedside- patient in the past did not tolerate NPH or regular insulin and would like mother to stay on basal/bolus for now- most likely will be d/c on home nutrition regimen which is not the same inpatient. Patient continues on oral Prednisone 5mg once daily Blood glucose levels in the last 24hrs have been 191-330mg/dL.     Review of Systems:  General: As above.  Respiratory: Denies any SOB, HEREDIA, or cough.  Gastrointestinal: Denies any n/v/d or abdominal pain.   Endocrine: Denies any polyuria, polydipsia, polyphagia, visual changes, or numbness in feet.     Allergies  walnut (Unknown)  metronidazole (Rash)  Lyrica (Unknown)  penicillin (Unknown)  Pineapple (Unknown)  Tagamet (Unknown)  heparin (Unknown)  Pecans (Unknown)  Hazelnut (Unknown)  meropenem (Rash)      Intolerances  None.       MEDICATIONS  (STANDING):  acetaminophen   Oral Liquid .. 650 milliGRAM(s) Enteral Tube every 8 hours  albuterol/ipratropium for Nebulization 3 milliLiter(s) Nebulizer every 6 hours  artificial  tears Solution 1 Drop(s) Both EYES every 6 hours  ascorbic acid 500 milliGRAM(s) Oral daily  Biotene Dry Mouth Oral Rinse 5 milliLiter(s) Swish and Spit every 6 hours  calcium carbonate 1250 mG  + Vitamin D (OsCal 500 + D) 1 Tablet(s) Oral <User Schedule>  chlorhexidine 0.12% Liquid 15 milliLiter(s) Oral Mucosa every 12 hours  chlorhexidine 4% Liquid 1 Application(s) Topical daily  dextrose 5%. 1000 milliLiter(s) IV Continuous <Continuous>  dextrose 5%. 1000 milliLiter(s) IV Continuous <Continuous>  dextrose 50% Injectable 25 Gram(s) IV Push once  dextrose 50% Injectable 12.5 Gram(s) IV Push once  diclofenac sodium 1% Gel 2 Gram(s) Topical every 6 hours  diphenhydrAMINE Injectable 12.5 milliGRAM(s) IV Push <User Schedule>  escitalopram 10 milliGRAM(s) Oral <User Schedule>  fentaNYL   Patch  12 MICROgram(s)/Hr 1 Patch Transdermal every 72 hours  fentaNYL   Patch  25 MICROgram(s)/Hr 1 Patch Transdermal every 72 hours  furosemide   Injectable 20 milliGRAM(s) IV Push once  gabapentin Solution 250 milliGRAM(s) Oral <User Schedule>  glucagon  Injectable 1 milliGRAM(s) IntraMuscular once  hemorrhoidal Ointment 1 Application(s) Rectal at bedtime  influenza  Vaccine (HIGH DOSE) 0.5 milliLiter(s) IntraMuscular once  insulin glargine Injectable (LANTUS) 24 Unit(s) SubCutaneous <User Schedule>  insulin lispro (ADMELOG) corrective regimen sliding scale   SubCutaneous every 6 hours  insulin lispro Injectable (ADMELOG) 12 Unit(s) SubCutaneous every 6 hours  lactobacillus acidophilus 1 Tablet(s) Oral <User Schedule>  levothyroxine 125 MICROGram(s) Oral daily  lidocaine   4% Patch 1 Patch Transdermal every 24 hours  melatonin Liquid 12 milliGRAM(s) Oral <User Schedule>  meropenem  IVPB 1000 milliGRAM(s) IV Intermittent every 8 hours  nystatin Powder 1 Application(s) Topical every 8 hours  pantoprazole  Injectable 40 milliGRAM(s) IV Push every 12 hours  potassium phosphate IVPB 30 milliMole(s) IV Intermittent once  prednisoLONE acetate 1% Suspension 1 Drop(s) Both EYES every 6 hours  predniSONE   Tablet 5 milliGRAM(s) Oral <User Schedule>  QUEtiapine 12.5 milliGRAM(s) Oral <User Schedule>  simethicone 160 milliGRAM(s) Chew <User Schedule>  sodium chloride 1 Gram(s) Oral every 12 hours  sodium chloride 0.65% Nasal 1 Spray(s) Both Nostrils every 6 hours  witch hazel Pads 1 Application(s) Topical every 12 hours      dextrose 50% Injectable 25 Gram(s) IV Push once  dextrose 50% Injectable 12.5 Gram(s) IV Push once  glucagon  Injectable 1 milliGRAM(s) IntraMuscular once  insulin glargine Injectable (LANTUS) 24 Unit(s) SubCutaneous <User Schedule>  insulin lispro (ADMELOG) corrective regimen sliding scale   SubCutaneous every 6 hours  insulin lispro Injectable (ADMELOG) 12 Unit(s) SubCutaneous every 6 hours  levothyroxine 125 MICROGram(s) Oral daily  predniSONE   Tablet 5 milliGRAM(s) Oral <User Schedule>      insulin lispro (ADMELOG) corrective regimen sliding scale   SubCutaneous every 6 hours  insulin lispro Injectable (ADMELOG) 12 Unit(s) SubCutaneous every 6 hours      PHYSICAL EXAM:  VITALS:   T(C): 36.5 (10-23-24 @ 05:19), Max: 36.8 (10-23-24 @ 00:07)  HR: 82 (10-23-24 @ 08:28) (73 - 93)  BP: 157/70 (10-23-24 @ 05:19) (138/62 - 158/68)  RR: 18 (10-23-24 @ 05:19) (18 - 18)  SpO2: 100% (10-23-24 @ 08:28) (99% - 100%)    GENERAL: In no acute distress  Respiratory: Respirations unlabored, trached on ventilator  Extremities: Warm and dry, no edema  NEURO: Alert, appropriate, follows commands and mouths words    LABS:  POCT Blood Glucose.: 330 mg/dL (10-23-24 @ 05:44)  POCT Blood Glucose.: 317 mg/dL (10-22-24 @ 23:01)  POCT Blood Glucose.: 285 mg/dL (10-22-24 @ 17:29)  POCT Blood Glucose.: 191 mg/dL (10-22-24 @ 12:07)  POCT Blood Glucose.: 223 mg/dL (10-22-24 @ 05:50)  POCT Blood Glucose.: 192 mg/dL (10-21-24 @ 20:53)  POCT Blood Glucose.: 252 mg/dL (10-21-24 @ 17:26)  POCT Blood Glucose.: 206 mg/dL (10-21-24 @ 11:20)  POCT Blood Glucose.: 172 mg/dL (10-21-24 @ 06:03)  POCT Blood Glucose.: 163 mg/dL (10-21-24 @ 00:06)  POCT Blood Glucose.: 182 mg/dL (10-20-24 @ 18:44)  POCT Blood Glucose.: 160 mg/dL (10-20-24 @ 12:07)                          8.1    3.82  )-----------( 143      ( 23 Oct 2024 06:45 )             28.4     10-23    139  |  103  |  13  ----------------------------<  326[H]  3.9   |  25  |  <0.30[L]    Ca    7.8[L]      23 Oct 2024 06:45  Phos  1.2     10-23  Mg     2.0     10-23      Urinalysis Basic - ( 23 Oct 2024 06:45 )    Color: x / Appearance: x / SG: x / pH: x  Gluc: 326 mg/dL / Ketone: x  / Bili: x / Urobili: x   Blood: x / Protein: x / Nitrite: x   Leuk Esterase: x / RBC: x / WBC x   Sq Epi: x / Non Sq Epi: x / Bacteria: x        A1C with Estimated Average Glucose Result: A1C with Estimated Average Glucose Result: 5.8 % (10-14-24 @ 05:08)  A1C with Estimated Average Glucose Result: 6.4 % (08-12-24 @ 07:32)

## 2024-10-23 NOTE — PROGRESS NOTE ADULT - PROBLEM SELECTOR PLAN 2
[] PNA  - CXR 10/7: Opacification of the right middle lobe may represent pneumonia versus atelectasis  - sputum culture 10/8 PSA  - completed course of cefepime on 10/12    [] c. diff  - vanco IV started 10/17, adjust by trough    [] persistent fevers  - meropenem started since 10/15-10/24  - CT A/P with ?multifocal pneumonia, presence of gastrocutaneous fistula. otherwise no collections/abscess noted. [] PNA  - CXR 10/7: Opacification of the right middle lobe may represent pneumonia versus atelectasis  - sputum culture 10/8 PSA  - completed course of cefepime on 10/12    [] persistent fevers  - meropenem started since 10/15-10/24 - d/c'd 10/23 for persistent diarrhea  - CT A/P with ?multifocal pneumonia, presence of gastrocutaneous fistula; otherwise no collections/abscess noted

## 2024-10-24 NOTE — PROGRESS NOTE ADULT - ASSESSMENT
75 yo F PMH T2DM on insulin, HTN, HLD, hypothyroidism, RA on Prednisone, Fibromyalgia, cerebral aneurysm s/p repair, chronic hypercapnic respiratory failure s/p trach (vent dependent) and chronic PEG 2022, recurrent C. diff infection (follows with Dr. 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  Pt also noted to have rectal bleeding this past week requiring transfusion 5 days ago in ED as per daughter. Daughter also reports brownish discharge from G-J tube. In ED, pt afebrile, fiO2 96-98% on 40% on ventilator.  Chest X-ray w/ opacification of right lung concerning for possible PNA.  Admitted to RCU for further management. Sputum cxs grew PSA; treated with course of Cefepime. Patient with decreased H+H received 1 unit of PRBCS on 10/10.  10/14 EGD w/ Dr. Rosales for PEGJ exchange and clippings placed for closure of fistula site.  Persistent fevers treated with meropenem and vanc (d/c'd 10/20).  CT A/P without infectious source.  Continue airway management with duonebs, chest PT and suction PRN.        10/23:  Feeds and insulin adjusted (uncontrolled BG levels) as per multidisciplinary team discussions involving nutrition, endocrine, nursing, management - final recs are ordered.  Increase in diarrhea - rectal tube placed, meropenem d/c'd.  Unable to sent cdiff specimen per hospital criteria and protocols - pt remains afebrile without leukocytosis.  Will continue to monitor and send specimen if needed.  Sent GI PCR.  Patient improving, discharge planning.  Marysol aware, updated and involved in POC.  Repleted electrolytes. 75 yo F PMH T2DM on insulin, HTN, HLD, hypothyroidism, RA on Prednisone, Fibromyalgia, cerebral aneurysm s/p repair, chronic hypercapnic respiratory failure s/p trach (vent dependent) and chronic PEG 2022, recurrent C. diff infection (follows with Dr. 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  Pt also noted to have rectal bleeding this past week requiring transfusion 5 days ago in ED as per daughter. Daughter also reports brownish discharge from G-J tube. In ED, pt afebrile, fiO2 96-98% on 40% on ventilator.  Chest X-ray w/ opacification of right lung concerning for possible PNA.  Admitted to RCU for further management. Sputum cxs grew PSA; treated with course of Cefepime. Patient with decreased H+H received 1 unit of PRBCS on 10/10.  10/14 EGD w/ Dr. Rosales for PEGJ exchange and clippings placed for closure of fistula site.  Persistent fevers treated with meropenem and vanc (d/c'd 10/20).  CT A/P without infectious source.  Continue airway management with duonebs, chest PT and suction PRN.        10/23:  Feeds and insulin adjusted (uncontrolled BG levels) as per multidisciplinary team discussions involving nutrition, endocrine, nursing, management - final recs are ordered.  Increase in diarrhea - rectal tube placed, meropenem d/c'd.  Unable to sent cdiff specimen per hospital criteria and protocols - pt remains afebrile without leukocytosis.  Will continue to monitor and send specimen if needed.  Sent GI PCR.  Patient improving, discharge planning.  Marysol aware, updated and involved in POC.  Repleted electrolytes.  10/24: Hypoglycemic this morning.  Insulin regimen adjusted by  Endocrine and reviewed with nursing.  Remains with presistent diarrhea.  Cdiff assay was negative.  Will trial immodium.

## 2024-10-24 NOTE — PROGRESS NOTE ADULT - NUTRITIONAL ASSESSMENT
Diet, NPO with Tube Feed:   Tube Feeding Modality: Jejunostomy  [place] Peptide 1.5 ([id number])  Total Volume for 24 Hours (mL): 960  Continuous  Until Goal Tube Feed Rate (mL per Hour): 80  Tube Feed Duration (in Hours): 12  Tube Feed Start Time: 06:00   Rate (mL per Hour): 70   Frequency: Every Hour  Free Water Flush Instructions:  20 cc q 1 hr to prevent j-tube from clogging  Alfred(7 Gm Arginine/7 Gm Glut/1.2 Gm HMB     Qty per Day:  1  No Carb Prosource (1pkg = 15gms Protein)     Qty per Day:  1  Banatrol TF     Qty per Day:  1/2 packet per daily (10-23-24 @ 15:45) [Active]

## 2024-10-24 NOTE — PROGRESS NOTE ADULT - ASSESSMENT
75 yo F PMH T2DM on insulin, HTN, HLD, hypothyroidism, RA on Prednisone, Fibromyalgia, cerebral aneurysm s/p repair, chronic hypercapnic respiratory failure s/p trach (vent dependent) and chronic PEG 2022, recurrent C. diff infection , persistent GJ tube leaks now s/p GC fistula repair 8/12  admitted 10/7/24 with resp distress and found to have RML opacity     Antibiotics  Ceftriaxone 10/7  Azithro 10/7  Cefepime 10/7--> 10/12  meropenem 10/15 --> 10/23  IV Vanco 10/17 --> 10/21      10/10 melena, anemia, blood tx  10/14 EGD for GJ exchange and piror PEG site closure  One benign-appearing, intrinsic moderate stenosis was found in the upper third of the        esophagus. The stenosis was traversed.       The cardia and gastric fundus were normal.       A gastric tube with tip in the jejunum was found in the gastric body. The PEG required        removal because it was leaking. The J tube extension (12F) was removed first. An externally        removable 24 Fr EndoVive Safety gastrostomy tube was lubricated. The guide wire was passed        through the existing G-tube port and snared endoscopically. The endoscope and snare were then        removed, pulling the wire out through the mouth. The g-tube was tied to the guidewire, pulled        through the mouth into the stomach and then pulled out from the stomach through the skin. The        bumper was attached to the gastrostomy tube. The feeding tube was then cut to an appropriate        length. The final position of the gastrostomy tube was confirmed by relook endoscopy, and        skin marking noted to be 3 cm at the external bumper. The final tension and compression of        the abdominal wall by the PEG tube and external bumper were checked and revealed that the        bumper was moderately tight and mildly deforming the skin. The J tube extension (12 F        EndoVive Jejunal feeding tube) was advanced into the stomach. The tip of the J tube had a        loop thread that was captured with a Resolution clip. The thread and the J tube extension        were advanced to the proximal jejunum, and the endoclip along with the tip of the J tube was        attached to the wall securely. The J tube extension was capped, and the tube site was cleaned        and dressed.       A small fistula was found on the anterior wall of the gastric body related to prior G tube        site. Coagulation of tissue near the fistula using argon plasma at 1.2 liters/minute and 35        peraza was successful. To closure the gastrocutaneous fistula, four hemostatic clips were        successfully placed (MR conditional, two 16 mm DuraClip and 2 Mantis clips.       The examined duodenum was normal.    10/14, 10/15 Fever, transient hypoxia post procedure  10/15 ProCalcitonin = 8.48 suggestive of bacterial process; BCx x2 NGTD; Trach: Pseudomonas   - though benign mg stain  10;16 Leukocytosis WBC = 14.26  10/17 fever, hypotension, abd distension, no diarrhea noted this am WBC = 15.02; Rising Procalcitonin = 38.49; Obstructed J tube   10/18  lost IV access re-gained  - CT scan late this afternoon  WBC = 8.68; Rising Procalcitonin >100  10/18 imaging suggests multifocal pneumonia  10/21 improved chest exam, Procalcitonin level considerably decreased, decreased FiO2  - afebrile since 10/18  10/21 UGI endoscopy done  Findings:       The esophagus was normal. A fistula was found on the anterior wall of the gastric body.       There was evidence of a gastrostomy present in the gastric body. The patient was placed in        the supine position. The endoscope was advanced to the jejunum and the prior placed J tube        with loop attached to the wall and the loop thread was cut by endoclip. The J tube and the G        tube were removed. The gastrostomy fistula was utilized and an Avanos 22 F        Gastrostomy/Jejunal tube was advanced. The jejunal tip was advanced through the pylorus and        into the duodenum. The endoscope was then advanced to the duodenum and the loop thread at the        tip of the J tube was captured and advanced to the proximal jejunum. The loop thread was        clipped to wall by a [employer] Resolution clip. The J tube flushed easily and the G        tube decompress the abdomen easily. The internal balloon was insufflated with 10 cc of water        to hold the GJ tube in place. The outside marking was at 3.  10/23  no distress, liquid stools noted  - Meropenem discontinued  10/24  persistent diarrhea    Suggest   check C diff  Begin Vanco 125 mg via NGT every 6 hours  (If Cdiff neg, then discontinue)      discussed with pulmonary attending, ACP

## 2024-10-24 NOTE — PROGRESS NOTE ADULT - PROBLEM SELECTOR PLAN 7
PAT - SURGICAL PRE-ADMISSION INSTRUCTIONS    NAME:  Xiomy Paulino                                                          TODAY'S DATE:  8/12/2020    SURGERY DATE:  8/17/2020                                  SURGERY ARRIVAL TIME:   0600 per office    1. Do NOT eat or drink anything, including candy or gum, after MIDNIGHT on 8/16/20 , unless you have specific instructions from your Surgeon or Anesthesia Provider to do so. 2. No smoking on the day of surgery. 3. No alcohol 24 hours prior to the day of surgery. 4. No recreational drugs for one week prior to the day of surgery. 5. Leave all valuables, including money/purse, at home. 6. Remove all jewelry, nail polish, makeup (including mascara); no lotions, powders, deodorant, or perfume/cologne/after shave. 7. Glasses/Contact lenses and Dentures may be worn to the hospital.  They will be removed prior to surgery. 8. Call your doctor if symptoms of a cold or illness develop within 24 ours prior to surgery. 9. AN ADULT MUST DRIVE YOU HOME AFTER OUTPATIENT SURGERY. 10. If you are having an OUTPATIENT procedure, please make arrangements for a responsible adult to be with you for 24 hours after your surgery. 11. If you are admitted to the hospital, you will be assigned to a bed after surgery is complete. Normally a family member will not be able to see you until you are in your assigned bed. 15. Family is encouraged to accompany you to the hospital.  We ask visitors in the treatment area to be limited to ONE person at a time to ensure patient privacy. EXCEPTIONS WILL BE MADE AS NEEDED. 15. Children under 12 are discouraged from entering the treatment area and need to be supervised by an adult when in the waiting room. Special Instructions: Take these medications the morning of surgery with a sip of water:  per 's instruction, HOLD oral diabetic medication on the MORNING OF surgery. , Follow physician instructions about insulin.   If NO instructions were given, take your usual dose of BASAL insulin the night BEFORE surgery., STOP anticoagulants AT LEAST 1 WEEK PRIOR to your surgery or, follow other MD instructions:  plavix, aspirin    Patient Prep:    shower with anti-bacterial soap    These surgical instructions were reviewed with patient during the PAT phone call. Directions: On the morning of surgery, please go to the main entrance. Sign in at the Registration Desk.     If you have any questions and/or concerns, please do not hesitate to call:  (Prior to the day of surgery)  [facility] unit:  [phone]  (Day of surgery)  [facility] unit:  [phone] - Continue Standing/ Breakthrough Tylenol   - Continue Gabapentin / PRN Oxy   - Continue Fentanyl patches

## 2024-10-24 NOTE — PROGRESS NOTE ADULT - PROBLEM SELECTOR PLAN 9
Patient has known hx of prior G-Tube stoma leak   - S/p EGD for closure of Gastric Fistula (8/12) w/ Total of 3 Mantis clips and two 22 mm Microtech clips by GI Dr Rosales   - Old stoma peg site with mild clear drainage s/p repair of fistula  - As per daughter feeds and meds all administered via j-port and g-tube remains to continuously vented for chronic gaseous distention   - 10/14 G-J exchanged by GI and clips applied to fistula site  - 10/17 PEGJ clogged  - 10/21 PEJ revision done, tolerating enteral feeds

## 2024-10-24 NOTE — PROGRESS NOTE ADULT - NS ATTEND AMEND GEN_ALL_CORE FT
-    74F PMH DM2 (insulin-dependent), HTN, HLD, hypothyroidism, RA on Prednisone, fibromyalgia, cerebral aneurysm s/p repair, chronic hypoxemia and hypercapnic respiratory failure s/p trach, vent-dependent, recurrent C diff infection, oropharyngeal dysphagia s/p G-J tube with persistent GJ tube leaks now s/p GC fistula repair 8/12, and recent presentation 5 days PTA for rectal bleeding requiring transfusion in the ED who now presents with acute hypoxemic respiratory distress 2/2 RML PNA, admitted to the RCU for further management. Found to have Pseudomonas aeruginosa in sputum culture and urine culture with ESBL E. coli. Course complicated by hypotension requiring IVF hydration with improvement. Now with diarrhea.    # Neuro: awake and alert, but with critical illness polyneuropathy. Outpatient f/up for hearing loss. Continue escitalopram and quetiapine. Fentanyl patch for pain along with diclofenac gel prn. Continue gabapentin. Oxycodone prn  # CV: HD stable. Hold off on diuresis today. Improved volume overload  # Pulm: continue lung protective ventilation. Pressure support trials as tolerates. Continue Duonebs  # GI: s/p G-J tube exchange by GI on 10/21. Keep G port for decompression of G-C fistula and J tube for meds and feeds. Tolerating feeds. Continued diarrhea. GI PCR negative. Check C diff given her history  # Renal: stable kidney function. Replete K and phos  # ID: observe off antibiotics. Appreciate ID follow-up. CT A/P unremarkable. Chest CT consistent with multifocal pneumonia. Will need repeat CT chest in 6 weeks to assess for resolution. Wound care follow-up for sacral wound  # Heme: SCD's for DVT ppx  # Endo: DM2, continue insulin coverage scale + Lantus qhs. Continue levothyroxine. Continue prednisone 5 mg daily  # Rheum: RA, on home prednisone. Hold off on Enbrel at this time  # Dispo: full code, prognosis guarded, discussed with patient, d/c planning

## 2024-10-24 NOTE — CHART NOTE - NSCHARTNOTEFT_GEN_A_CORE
Nutrition  Chart Note     Diet, NPO with Tube Feed:   Tube Feeding Modality: Jejunostomy  [employer]ie [employer] Peptide 1.5 (KFPEPT1.5RTH)  Total Volume for 24 Hours (mL): 960  Continuous  Until Goal Tube Feed Rate (mL per Hour): 80  Tube Feed Duration (in Hours): 12  Tube Feed Start Time: 06:00   Rate (mL per Hour): 70   Frequency: Every Hour  Free Water Flush Instructions:  20 cc q 1 hr to prevent j-tube from clogging  Alfred(7 Gm Arginine/7 Gm Glut/1.2 Gm HMB     Qty per Day:  1  No Carb Prosource (1pkg = 15gms Protein)     Qty per Day:  1  Banatrol TF     Qty per Day:  1/2 packet per daily (10-23-24 @ 15:45) [Active]    Dosing weight 54.4kg 10/21/24    MEDICATIONS  (STANDING):  ascorbic acid  calcium carbonate 1250 mG  + Vitamin D (OsCal 500 + D)  dextrose 5%.  dextrose 5%.  dextrose 50% Injectable  dextrose 50% Injectable  glucagon  Injectable  insulin glargine Injectable (LANTUS)  insulin lispro (ADMELOG) corrective regimen sliding scale  insulin lispro Injectable (ADMELOG)  levothyroxine  pantoprazole  Injectable  predniSONE   Tablet  simethicone  sodium chloride    Pertinent Labs: 10-24 @ 07:03: Na 141, BUN 13, Cr <0.30[L], BG 45[LL], K+ 3.5, Phos 2.3[L], Mg 1.9, Alk Phos --, ALT/SGPT --, AST/SGOT --, HbA1c --    A1C with Estimated Average Glucose Result: 5.8 % (10-14-24 @ 05:08)  A1C with Estimated Average Glucose Result: 6.4 % (08-12-24 @ 07:32)    Finger Sticks:  POCT Blood Glucose.: 113 mg/dL (10-24 @ 06:32)  POCT Blood Glucose.: 194 mg/dL (10-24 @ 05:38)  POCT Blood Glucose.: 60 mg/dL (10-24 @ 05:10)  POCT Blood Glucose.: 59 mg/dL (10-24 @ 05:08)  POCT Blood Glucose.: 274 mg/dL (10-23 @ 23:26)  POCT Blood Glucose.: 391 mg/dL (10-23 @ 17:22)  POCT Blood Glucose.: 431 mg/dL (10-23 @ 14:47)  POCT Blood Glucose.: 360 mg/dL (10-23 @ 11:52)    ESTIMATED NEEDS:  Based on: dosing weight 54.4kg   30-35kcal/kg 1632-1904kcal/day  1.4-1.8g/kg 76-98g protein/day  defer fluid needs to team     Spoke with pt daughter Marysol Spann over phone ([phone]) this morning. Daughter requested more discussion regarding patients calorie and protein needs with reasoning. Explained current calorie and protein recommendations in detail with reasoning and current tube feeding order. Daughter requesting free water be increased, writer gave daughters water recommendations to PA. Daughter continuing to request to continue 80ml/hr of tube feeding be provided x 12 hours. Provider to RN order placed yesterday to ensure full tube feeding order of 960ml is provided to the patient.   Recommendations provided to PA:   Add liquid Multivitamin if no contraindications   Consider obtaining update iron level     RD remains available   Rufina Morris, MS, RD, CDN / Teams Nutrition  Chart Note     Diet, NPO with Tube Feed:   Tube Feeding Modality: Jejunostomy  Casie Frazier Peptide 1.5 (KFPEPT1.5RTH)  Total Volume for 24 Hours (mL): 960  Continuous  Until Goal Tube Feed Rate (mL per Hour): 80  Tube Feed Duration (in Hours): 12  Tube Feed Start Time: 06:00   Rate (mL per Hour): 70   Frequency: Every Hour  Free Water Flush Instructions:  20 cc q 1 hr to prevent j-tube from clogging  Alfred(7 Gm Arginine/7 Gm Glut/1.2 Gm HMB     Qty per Day:  1  No Carb Prosource (1pkg = 15gms Protein)     Qty per Day:  1  Banatrol TF     Qty per Day:  1/2 packet per daily (10-23-24 @ 15:45) [Active]    Dosing weight 54.4kg 10/21/24    MEDICATIONS  (STANDING):  ascorbic acid  calcium carbonate 1250 mG  + Vitamin D (OsCal 500 + D)  dextrose 5%.  dextrose 5%.  dextrose 50% Injectable  dextrose 50% Injectable  glucagon  Injectable  insulin glargine Injectable (LANTUS)  insulin lispro (ADMELOG) corrective regimen sliding scale  insulin lispro Injectable (ADMELOG)  levothyroxine  pantoprazole  Injectable  predniSONE   Tablet  simethicone  sodium chloride    Pertinent Labs: 10-24 @ 07:03: Na 141, BUN 13, Cr <0.30[L], BG 45[LL], K+ 3.5, Phos 2.3[L], Mg 1.9, Alk Phos --, ALT/SGPT --, AST/SGOT --, HbA1c --    A1C with Estimated Average Glucose Result: 5.8 % (10-14-24 @ 05:08)  A1C with Estimated Average Glucose Result: 6.4 % (08-12-24 @ 07:32)    Finger Sticks:  POCT Blood Glucose.: 113 mg/dL (10-24 @ 06:32)  POCT Blood Glucose.: 194 mg/dL (10-24 @ 05:38)  POCT Blood Glucose.: 60 mg/dL (10-24 @ 05:10)  POCT Blood Glucose.: 59 mg/dL (10-24 @ 05:08)  POCT Blood Glucose.: 274 mg/dL (10-23 @ 23:26)  POCT Blood Glucose.: 391 mg/dL (10-23 @ 17:22)  POCT Blood Glucose.: 431 mg/dL (10-23 @ 14:47)  POCT Blood Glucose.: 360 mg/dL (10-23 @ 11:52)    ESTIMATED NEEDS:  Based on: dosing weight 54.4kg   30-35kcal/kg 1632-1904kcal/day  1.4-1.8g/kg 76-98g protein/day  defer fluid needs to team     Spoke with pt daughter Marysol Spann over phone ([phone]) this morning. Daughter requested more discussion regarding patients calorie and protein needs with reasoning. Explained current calorie and protein recommendations in detail with reasoning and current tube feeding order. Daughter requesting free water be increased, writer gave daughters water recommendations to PA. Daughter continuing to request to continue 80ml/hr of tube feeding be provided x 12 hours. Provider to RN order placed yesterday to ensure full tube feeding order of 960ml is provided to the patient.   Insulin regimen deferred to team.   Recommendations provided to PA:   Add liquid Multivitamin if no contraindications   Consider obtaining update iron level     RD remains available   Rufina Morris, MS, RD, CDN / Teams

## 2024-10-24 NOTE — PROGRESS NOTE ADULT - SUBJECTIVE AND OBJECTIVE BOX
Patient seen today for follow up inpatient Diabetes Mellitus management.    Chief Complaint: Type 2 Diabetes Mellitus     INTERVAL HX:  Patient seen in [facility] RCU1 505 W1. Patient is alert and oriented, resting in bed. Patient remains trached on vent. Remains on 12hr TFs [employer] [place] @80cc/hr, goal, tolerating well. As per RN and nutritionist note, patient to receive all 960mL of TF bag so it goes over 12hrs to about 2000 sometimes. Noted hypoglycemia this am/FBG to 59mg/dL. BG improved quickly with hypoglycemia protocol, 1 amp D50 given, BG to 171mg/dL. Blood serum BG noted to be 45mg/dL, was take at time of hypoglycemia (lab incorrect time). FBG hyperglycemic to 300s yesterday am most likely due to patient was on 24hr TFs and patient now switched to 12hr TFs so TFs held overnight until 6A-6P feeding. BG pre-lunch now 297mg/dL given patient received steroid and TFs started at 6A but nutritional insulin held by primary team given hypoglycemia this am. Blood glucose levels in the last 24hrs have been 59-391mg/dL.     Review of Systems:  General: As above.  Respiratory: Denies any SOB, HEREDIA, or cough.  Gastrointestinal: Denies any n/v/d or abdominal pain.   Endocrine: Denies any polyuria, polydipsia, polyphagia, visual changes, or numbness in feet.     Allergies  walnut (Unknown)  metronidazole (Rash)  Lyrica (Unknown)  penicillin (Unknown)  Pineapple (Unknown)  Tagamet (Unknown)  heparin (Unknown)  Pecans (Unknown)  Hazelnut (Unknown)  meropenem (Rash)      Intolerances  None.       MEDICATIONS  (STANDING):  acetaminophen   Oral Liquid .. 650 milliGRAM(s) Enteral Tube every 8 hours  albuterol/ipratropium for Nebulization 3 milliLiter(s) Nebulizer every 6 hours  artificial  tears Solution 1 Drop(s) Both EYES every 6 hours  ascorbic acid 500 milliGRAM(s) Oral daily  Biotene Dry Mouth Oral Rinse 5 milliLiter(s) Swish and Spit every 6 hours  calcium carbonate 1250 mG  + Vitamin D (OsCal 500 + D) 1 Tablet(s) Oral <User Schedule>  chlorhexidine 0.12% Liquid 15 milliLiter(s) Oral Mucosa every 12 hours  chlorhexidine 4% Liquid 1 Application(s) Topical daily  dextrose 5%. 1000 milliLiter(s) IV Continuous <Continuous>  dextrose 5%. 1000 milliLiter(s) IV Continuous <Continuous>  dextrose 50% Injectable 25 Gram(s) IV Push once  dextrose 50% Injectable 12.5 Gram(s) IV Push once  diclofenac sodium 1% Gel 2 Gram(s) Topical every 6 hours  escitalopram 10 milliGRAM(s) Oral <User Schedule>  fentaNYL   Patch  12 MICROgram(s)/Hr 1 Patch Transdermal every 72 hours  fentaNYL   Patch  25 MICROgram(s)/Hr 1 Patch Transdermal every 72 hours  gabapentin Solution 250 milliGRAM(s) Oral <User Schedule>  glucagon  Injectable 1 milliGRAM(s) IntraMuscular once  hemorrhoidal Ointment 1 Application(s) Rectal at bedtime  influenza  Vaccine (HIGH DOSE) 0.5 milliLiter(s) IntraMuscular once  insulin glargine Injectable (LANTUS) 21 Unit(s) SubCutaneous <User Schedule>  insulin lispro (ADMELOG) corrective regimen sliding scale   SubCutaneous every 6 hours  insulin lispro Injectable (ADMELOG) 20 Unit(s) SubCutaneous <User Schedule>  lactobacillus acidophilus 1 Tablet(s) Oral <User Schedule>  levothyroxine 125 MICROGram(s) Oral daily  lidocaine   4% Patch 1 Patch Transdermal every 24 hours  melatonin Liquid 12 milliGRAM(s) Oral <User Schedule>  nystatin Powder 1 Application(s) Topical every 8 hours  pantoprazole  Injectable 40 milliGRAM(s) IV Push every 12 hours  prednisoLONE acetate 1% Suspension 1 Drop(s) Both EYES every 6 hours  predniSONE   Tablet 5 milliGRAM(s) Oral <User Schedule>  QUEtiapine 12.5 milliGRAM(s) Oral <User Schedule>  simethicone 160 milliGRAM(s) Chew <User Schedule>  sodium chloride 1 Gram(s) Oral every 12 hours  sodium chloride 0.65% Nasal 1 Spray(s) Both Nostrils every 6 hours  witch hazel Pads 1 Application(s) Topical every 12 hours      dextrose 50% Injectable 25 Gram(s) IV Push once  dextrose 50% Injectable 12.5 Gram(s) IV Push once  glucagon  Injectable 1 milliGRAM(s) IntraMuscular once  insulin glargine Injectable (LANTUS) 21 Unit(s) SubCutaneous <User Schedule>  insulin lispro (ADMELOG) corrective regimen sliding scale   SubCutaneous every 6 hours  insulin lispro Injectable (ADMELOG) 20 Unit(s) SubCutaneous <User Schedule>  levothyroxine 125 MICROGram(s) Oral daily  predniSONE   Tablet 5 milliGRAM(s) Oral <User Schedule>      insulin lispro (ADMELOG) corrective regimen sliding scale   SubCutaneous every 6 hours  insulin lispro Injectable (ADMELOG) 20 Unit(s) SubCutaneous <User Schedule>      PHYSICAL EXAM:  VITALS:   T(C): 36.2 (10-24-24 @ 05:00), Max: 36.6 (10-23-24 @ 23:29)  HR: 66 (10-24-24 @ 08:40) (63 - 86)  BP: 133/56 (10-24-24 @ 05:00) (133/56 - 162/79)  RR: 18 (10-24-24 @ 05:00) (17 - 20)  SpO2: 100% (10-24-24 @ 08:40) (100% - 100%)    GENERAL: In no acute distress  Respiratory: Respirations unlabored  Extremities: Warm and dry, no edema  NEURO: Alert and oriented, appropriate     LABS:  POCT Blood Glucose.: 297 mg/dL (10-24-24 @ 10:58)  POCT Blood Glucose.: 171 mg/dL (10-24-24 @ 08:38)  POCT Blood Glucose.: 113 mg/dL (10-24-24 @ 06:32)  POCT Blood Glucose.: 194 mg/dL (10-24-24 @ 05:38)  POCT Blood Glucose.: 60 mg/dL (10-24-24 @ 05:10)  POCT Blood Glucose.: 59 mg/dL (10-24-24 @ 05:08)  POCT Blood Glucose.: 274 mg/dL (10-23-24 @ 23:26)  POCT Blood Glucose.: 391 mg/dL (10-23-24 @ 17:22)  POCT Blood Glucose.: 431 mg/dL (10-23-24 @ 14:47)  POCT Blood Glucose.: 360 mg/dL (10-23-24 @ 11:52)  POCT Blood Glucose.: 330 mg/dL (10-23-24 @ 05:44)  POCT Blood Glucose.: 317 mg/dL (10-22-24 @ 23:01)  POCT Blood Glucose.: 285 mg/dL (10-22-24 @ 17:29)  POCT Blood Glucose.: 191 mg/dL (10-22-24 @ 12:07)  POCT Blood Glucose.: 223 mg/dL (10-22-24 @ 05:50)  POCT Blood Glucose.: 192 mg/dL (10-21-24 @ 20:53)  POCT Blood Glucose.: 252 mg/dL (10-21-24 @ 17:26)                          8.6    7.29  )-----------( 166      ( 24 Oct 2024 07:03 )             29.8     10-24    141  |  103  |  13  ----------------------------<  45[LL]  3.5   |  28  |  <0.30[L]    Ca    8.1[L]      24 Oct 2024 07:03  Phos  2.3     10-24  Mg     1.9     10-24          Urinalysis Basic - ( 24 Oct 2024 07:03 )    Color: x / Appearance: x / SG: x / pH: x  Gluc: 45 mg/dL / Ketone: x  / Bili: x / Urobili: x   Blood: x / Protein: x / Nitrite: x   Leuk Esterase: x / RBC: x / WBC x   Sq Epi: x / Non Sq Epi: x / Bacteria: x        A1C with Estimated Average Glucose Result: A1C with Estimated Average Glucose Result: 5.8 % (10-14-24 @ 05:08)  A1C with Estimated Average Glucose Result: 6.4 % (08-12-24 @ 07:32)

## 2024-10-24 NOTE — PROGRESS NOTE ADULT - SUBJECTIVE AND OBJECTIVE BOX
Follow Up:  diarrhea    Interval History/ROS:  breif episode of decreased responsiveness, BC >300, no shaking, recoveed  has diarrhea,   rectal tube inserted last night    Allergies  walnut (Unknown)  metronidazole (Rash)  Lyrica (Unknown)  penicillin (Unknown)  Pineapple (Unknown)  Tagamet (Unknown)  heparin (Unknown)  Pecans (Unknown)  Hazelnut (Unknown)  meropenem (Rash)    ANTIMICROBIALS:      OTHER MEDS:  MEDICATIONS  (STANDING):  acetaminophen   Oral Liquid .. 650 every 8 hours  albuterol/ipratropium for Nebulization 3 every 6 hours  dextrose 50% Injectable 12.5 once  dextrose 50% Injectable 25 once  escitalopram 10 <User Schedule>  fentaNYL   Patch  12 MICROgram(s)/Hr 1 every 72 hours  fentaNYL   Patch  25 MICROgram(s)/Hr 1 every 72 hours  gabapentin Solution 250 <User Schedule>  glucagon  Injectable 1 once  influenza  Vaccine (HIGH DOSE) 0.5 once  insulin glargine Injectable (LANTUS) 21 <User Schedule>  insulin lispro (ADMELOG) corrective regimen sliding scale  every 6 hours  insulin lispro Injectable (ADMELOG) 10 <User Schedule>  levothyroxine 125 daily  melatonin Liquid 12 <User Schedule>  pantoprazole  Injectable 40 every 12 hours  predniSONE   Tablet 5 <User Schedule>  QUEtiapine 12.5 <User Schedule>  simethicone 160 <User Schedule>      Vital Signs Last 24 Hrs  T(C): 36.2 (24 Oct 2024 05:00), Max: 36.6 (23 Oct 2024 23:29)  T(F): 97.2 (24 Oct 2024 05:00), Max: 97.9 (23 Oct 2024 23:29)  HR: 66 (24 Oct 2024 11:24) (63 - 86)  BP: 133/56 (24 Oct 2024 05:00) (133/56 - 162/79)  BP(mean): --  RR: 18 (24 Oct 2024 05:00) (17 - 18)  SpO2: 98% (24 Oct 2024 11:24) (98% - 100%)    Parameters below as of 24 Oct 2024 05:24  Patient On (Oxygen Delivery Method): ventilator        PHYSICAL EXAM:  General:  NAD, Non-toxic  Neurology: A&Ox3, nonfocal  Respiratory: Clear to auscultation bilaterally  CV: RRR, S1S2, no murmurs, rubs or gallops  Abdominal: Soft, Non-tender, sl -distended, normal bowel sounds  Extremities: 1+ edema,   Line Sites: Clear  Skin: No rash                          8.6    7.29  )-----------( 166      ( 24 Oct 2024 07:03 )             29.8       10-24    141  |  103  |  13  ----------------------------<  45[LL]  3.5   |  28  |  <0.30[L]    Ca    8.1[L]      24 Oct 2024 07:03  Phos  2.3     10-24  Mg     1.9     10-24        MICROBIOLOGY:  .Blood BLOOD  10-17-24   No growth at 5 days  --  --      .Blood BLOOD  10-17-24   No growth at 5 days  --  --      .Blood BLOOD  10-15-24   No growth at 5 days  --  --      Trach Asp Tracheal Aspirate  10-15-24   Mixed gram negative rods including  Numerous Pseudomonas aeruginosa  Commensal vinay consistent with body site  --  Pseudomonas aeruginosa      .Blood BLOOD  10-15-24   No growth at 5 days  --  --      Trach Asp Tracheal Aspirate  10-08-24   Few Pseudomonas aeruginosa  Commensal vinay consistent with body site  --  Pseudomonas aeruginosa      Clean Catch Clean Catch (Midstream)  10-07-24   10,000 - 49,000 CFU/mL Escherichia coli ESBL  --  Escherichia coli ESBL      .Blood BLOOD  10-07-24   No growth at 5 days  --  --      .Blood BLOOD  10-07-24   No growth at 5 days  --  --      Clean Catch Clean Catch (Midstream)  10-03-24   Culture grew 3 or more types of organisms which indicate  collection contamination; consider recollection only if clinically  indicated.  --  --    RADIOLOGY:  < from: CT Chest w/ IV Cont (10.18.24 @ 17:18) >  IMPRESSION:    Multifocal pneumonia.    New small pleural effusions, and increased subcutaneous edema compared to   10/7/2024.    Gastrocutaneous fistula. No evidence of drainable fluid collection.    < end of copied text >      Zion Newman MD; Division of Infectious Disease; Pager: [phone]; nights and weekends: [phone]

## 2024-10-24 NOTE — PROGRESS NOTE ADULT - PROBLEM SELECTOR PLAN 2
[] PNA  - CXR 10/7: Opacification of the right middle lobe may represent pneumonia versus atelectasis  - sputum culture 10/8 PSA  - completed course of cefepime on 10/12    [] persistent fevers  - meropenem started since 10/15-10/24 - d/c'd 10/23 for persistent diarrhea  - CT A/P with ?multifocal pneumonia, presence of gastrocutaneous fistula; otherwise no collections/abscess noted

## 2024-10-24 NOTE — PROGRESS NOTE ADULT - PROBLEM SELECTOR PLAN 1
Inpatient Plan:  - Please monitor blood glucose values q 6 hours while on tube feeding or NPO  - Adjust Lantus to 21 units at 1800  - Adjust Admelog 20u at 0600, 1200, 1800 while on continuous TFs (HOLD if tube feeds held and HOLD 1800 dose if TFs are done at 1800 (6P))  - C/w moderate dose Admelog correctional scale q6h while on TFs/NPO/overnight     Discharge Plan:  - TBD depending on insulin requirement and discharge tube feeding regimen (Bolus vs continuous tube feeding)   - If bolus tube feeding > Lantus plus Humalog   - Patient should have BGs checked 4x a day while on TFs. Please tell patient to contact Endocrinologist if BG <70mg/dL x1 or >200mg/dL consistently or >400mg/dL x1.   - Followup with Dr Ruth: Endocrinology [facility]: [address] [place] [address]. [address]. [place] [address]. Tel: [phone] Telemedicine- daughter to schedule.     Pt will need RX for basal insulin pen (ie. Basaglar, Lantus, Tresiba, Toujeo, Levemir) and bolus insulin pen (ie. humalog/novolog/admelog) depending on insurance coverage; please send test scripts to see which is covered. Please send prescriptions for diabetes supplies (glucometer, test strips, lancets, alcohol swabs, insulin pen needles). Inpatient Plan:  - Please monitor blood glucose values q 6 hours while on tube feeding or NPO  - Adjust Lantus to 21 units at 1800  - Adjust Admelog 20u at 0600, 1200, and 10u at 1800 while on continuous TFs (HOLD if tube feeds held and HOLD 1800 dose if TFs are done at 1800 (6P))  - C/w moderate dose Admelog correctional scale q6h while on TFs/NPO/overnight     Discharge Plan:  - TBD depending on insulin requirement and discharge tube feeding regimen (Bolus vs continuous tube feeding)   - If bolus tube feeding > Lantus plus Humalog   - Patient should have BGs checked 4x a day while on TFs. Please tell patient to contact Endocrinologist if BG <70mg/dL x1 or >200mg/dL consistently or >400mg/dL x1.   - Followup with Dr Ruth: Endocrinology [facility]: [address] [place] [address]. [address]. [place] [address]. Tel: [phone] [id number]- daughter to schedule.     Pt will need RX for basal insulin pen (ie. Basaglar, Lantus, Tresiba, Toujeo, Levemir) and bolus insulin pen (ie. humalog/novolog/admelog) depending on insurance coverage; please send test scripts to see which is covered. Please send prescriptions for diabetes supplies (glucometer, test strips, lancets, alcohol swabs, insulin pen needles).

## 2024-10-24 NOTE — PROGRESS NOTE ADULT - SUBJECTIVE AND OBJECTIVE BOX
Patient is a 74y old  Female who presents with a chief complaint of Patient admitted with Hypoxemia and episode of Bright red blood per rectum (23 Oct 2024 18:18)      Interval Events:    REVIEW OF SYSTEMS:  [ ] Positive  [ ] All other systems negative  [ ] Unable to assess ROS because ________    Vital Signs Last 24 Hrs  T(C): 36.2 (10-24-24 @ 05:00), Max: 36.6 (10-23-24 @ 23:29)  T(F): 97.2 (10-24-24 @ 05:00), Max: 97.9 (10-23-24 @ 23:29)  HR: 65 (10-24-24 @ 05:24) (63 - 86)  BP: 133/56 (10-24-24 @ 05:00) (133/56 - 162/79)  RR: 18 (10-24-24 @ 05:00) (17 - 20)  SpO2: 100% (10-24-24 @ 05:24) (100% - 100%)    PHYSICAL EXAM:  HEENT:   [ ]Tracheostomy:  [ ]Pupils equal  [ ]No oral lesions  [ ]Abnormal    SKIN  [ ]No Rash  [ ] Abnormal  [ ] pressure    CARDIAC  [ ]Regular  [ ]Abnormal    PULMONARY  [ ]Bilateral Clear Breath Sounds  [ ]Normal Excursion  [ ]Abnormal    GI  [ ]PEG      [ ] +BS		              [ ]Soft, nondistended, nontender	  [ ]Abnormal    MUSCULOSKELETAL                                   [ ]Bedbound                 [ ]Abnormal    [ ]Ambulatory/OOB to chair                           EXTREMITIES                                         [ ]Normal  [ ]Edema                           NEUROLOGIC  [ ] Normal, non focal  [ ] Focal findings:    PSYCHIATRIC  [ ]Alert and appropriate  [ ] Sedated	 [ ]Agitated    :  Mcbride: [ ] Yes, if yes: Date of Placement:                   [  ] No    LINES: Central Lines [ ] Yes, if yes: Date of Placement                                     [  ] No    HOSPITAL MEDICATIONS:  MEDICATIONS  (STANDING):  acetaminophen   Oral Liquid .. 650 milliGRAM(s) Enteral Tube every 8 hours  albuterol/ipratropium for Nebulization 3 milliLiter(s) Nebulizer every 6 hours  artificial  tears Solution 1 Drop(s) Both EYES every 6 hours  ascorbic acid 500 milliGRAM(s) Oral daily  Biotene Dry Mouth Oral Rinse 5 milliLiter(s) Swish and Spit every 6 hours  calcium carbonate 1250 mG  + Vitamin D (OsCal 500 + D) 1 Tablet(s) Oral <User Schedule>  chlorhexidine 0.12% Liquid 15 milliLiter(s) Oral Mucosa every 12 hours  chlorhexidine 4% Liquid 1 Application(s) Topical daily  dextrose 5%. 1000 milliLiter(s) (50 mL/Hr) IV Continuous <Continuous>  dextrose 5%. 1000 milliLiter(s) (100 mL/Hr) IV Continuous <Continuous>  dextrose 50% Injectable 25 Gram(s) IV Push once  dextrose 50% Injectable 12.5 Gram(s) IV Push once  diclofenac sodium 1% Gel 2 Gram(s) Topical every 6 hours  escitalopram 10 milliGRAM(s) Oral <User Schedule>  fentaNYL   Patch  12 MICROgram(s)/Hr 1 Patch Transdermal every 72 hours  fentaNYL   Patch  25 MICROgram(s)/Hr 1 Patch Transdermal every 72 hours  gabapentin Solution 250 milliGRAM(s) Oral <User Schedule>  glucagon  Injectable 1 milliGRAM(s) IntraMuscular once  hemorrhoidal Ointment 1 Application(s) Rectal at bedtime  influenza  Vaccine (HIGH DOSE) 0.5 milliLiter(s) IntraMuscular once  insulin glargine Injectable (LANTUS) 25 Unit(s) SubCutaneous <User Schedule>  insulin lispro (ADMELOG) corrective regimen sliding scale   SubCutaneous every 6 hours  insulin lispro Injectable (ADMELOG) 22 Unit(s) SubCutaneous <User Schedule>  lactobacillus acidophilus 1 Tablet(s) Oral <User Schedule>  levothyroxine 125 MICROGram(s) Oral daily  lidocaine   4% Patch 1 Patch Transdermal every 24 hours  melatonin Liquid 12 milliGRAM(s) Oral <User Schedule>  nystatin Powder 1 Application(s) Topical every 8 hours  pantoprazole  Injectable 40 milliGRAM(s) IV Push every 12 hours  prednisoLONE acetate 1% Suspension 1 Drop(s) Both EYES every 6 hours  predniSONE   Tablet 5 milliGRAM(s) Oral <User Schedule>  QUEtiapine 12.5 milliGRAM(s) Oral <User Schedule>  simethicone 160 milliGRAM(s) Chew <User Schedule>  sodium chloride 1 Gram(s) Oral every 12 hours  sodium chloride 0.65% Nasal 1 Spray(s) Both Nostrils every 6 hours  witch hazel Pads 1 Application(s) Topical every 12 hours    MEDICATIONS  (PRN):      LABS:                        8.6    7.29  )-----------( 166      ( 24 Oct 2024 07:03 )             29.8         CAPILLARY BLOOD GLUCOSE    MICROBIOLOGY:     RADIOLOGY:  [ ] Reviewed and interpreted by me    Mode: AC/ CMV (Assist Control/ Continuous Mandatory Ventilation)  RR (machine): 18  TV (machine): 350  FiO2: 40  PEEP: 5  ITime: 1  MAP: 10  PIP: 31   Patient is a 74y old  Female who presents with a chief complaint of Patient admitted with Hypoxemia and episode of Bright red blood per rectum (23 Oct 2024 18:18)      Interval Events:  Hypoglycemic episode in the morning.  Given dextrose.    REVIEW OF SYSTEMS:  [ ] Positive  [X] All other systems negative  [] Unable to assess ROS because ________    Vital Signs Last 24 Hrs  T(C): 36.2 (10-24-24 @ 05:00), Max: 36.6 (10-23-24 @ 23:29)  T(F): 97.2 (10-24-24 @ 05:00), Max: 97.9 (10-23-24 @ 23:29)  HR: 65 (10-24-24 @ 05:24) (63 - 86)  BP: 133/56 (10-24-24 @ 05:00) (133/56 - 162/79)  RR: 18 (10-24-24 @ 05:00) (17 - 20)  SpO2: 100% (10-24-24 @ 05:24) (100% - 100%)      PHYSICAL EXAM:  HEENT:   [X]Tracheostomy: #8 XLT shiley  [X]Pupils equal  [ ]No oral lesions  [ ]Abnormal    SKIN  [ ]No Rash  [X] Abnormal - IAD, MAD at old PEG site  [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    CARDIAC  [X]Regular  [ ]Abnormal    PULMONARY  [X]Bilateral Clear Breath Sounds  [ ]Normal Excursion  [ ]Abnormal    GI  [X]PEGJ      [X] +BS		              [X]Soft, nondistended, nontender	  [X]Abnormal - old PEGJ site c/d/i    MUSCULOSKELETAL                                   [X]Bedbound                 [ ]Abnormal    [ ]Ambulatory/OOB to chair                           EXTREMITIES                                         [ ]Normal  [X]Edema - generalized edema                        NEUROLOGIC  [ ] Normal, non focal  [X] Focal findings: awake, alert, following commands on all 4 extremities     PSYCHIATRIC  [X] Alert, however was in a daze with eyes open but not responding or tracking stimuli. Daughter was very concerned.  Vitals were stable, .  During this transient episode lasting approx 5 minutes patient was le to follow commands (squeezed my hand with both hands and moved both feeT).  Eventually patient broke her stare and track my movements and was responsive to questions appropriately.  She did not endorse any complaints and smiled during interview.   [ ] Sedated	 [ ]Agitated    :  Mcbride: [ ] Yes, if yes: Date of Placement:                   [X] No    LINES: Central Lines [ ] Yes, if yes: Date of Placement                                     [X] No      HOSPITAL MEDICATIONS:  MEDICATIONS  (STANDING):  acetaminophen   Oral Liquid .. 650 milliGRAM(s) Enteral Tube every 8 hours  albuterol/ipratropium for Nebulization 3 milliLiter(s) Nebulizer every 6 hours  artificial  tears Solution 1 Drop(s) Both EYES every 6 hours  ascorbic acid 500 milliGRAM(s) Oral daily  Biotene Dry Mouth Oral Rinse 5 milliLiter(s) Swish and Spit every 6 hours  calcium carbonate 1250 mG  + Vitamin D (OsCal 500 + D) 1 Tablet(s) Oral <User Schedule>  chlorhexidine 0.12% Liquid 15 milliLiter(s) Oral Mucosa every 12 hours  chlorhexidine 4% Liquid 1 Application(s) Topical daily  dextrose 5%. 1000 milliLiter(s) (50 mL/Hr) IV Continuous <Continuous>  dextrose 5%. 1000 milliLiter(s) (100 mL/Hr) IV Continuous <Continuous>  dextrose 50% Injectable 25 Gram(s) IV Push once  dextrose 50% Injectable 12.5 Gram(s) IV Push once  diclofenac sodium 1% Gel 2 Gram(s) Topical every 6 hours  escitalopram 10 milliGRAM(s) Oral <User Schedule>  fentaNYL   Patch  12 MICROgram(s)/Hr 1 Patch Transdermal every 72 hours  fentaNYL   Patch  25 MICROgram(s)/Hr 1 Patch Transdermal every 72 hours  gabapentin Solution 250 milliGRAM(s) Oral <User Schedule>  glucagon  Injectable 1 milliGRAM(s) IntraMuscular once  hemorrhoidal Ointment 1 Application(s) Rectal at bedtime  influenza  Vaccine (HIGH DOSE) 0.5 milliLiter(s) IntraMuscular once  insulin glargine Injectable (LANTUS) 25 Unit(s) SubCutaneous <User Schedule>  insulin lispro (ADMELOG) corrective regimen sliding scale   SubCutaneous every 6 hours  insulin lispro Injectable (ADMELOG) 22 Unit(s) SubCutaneous <User Schedule>  lactobacillus acidophilus 1 Tablet(s) Oral <User Schedule>  levothyroxine 125 MICROGram(s) Oral daily  lidocaine   4% Patch 1 Patch Transdermal every 24 hours  melatonin Liquid 12 milliGRAM(s) Oral <User Schedule>  nystatin Powder 1 Application(s) Topical every 8 hours  pantoprazole  Injectable 40 milliGRAM(s) IV Push every 12 hours  prednisoLONE acetate 1% Suspension 1 Drop(s) Both EYES every 6 hours  predniSONE   Tablet 5 milliGRAM(s) Oral <User Schedule>  QUEtiapine 12.5 milliGRAM(s) Oral <User Schedule>  simethicone 160 milliGRAM(s) Chew <User Schedule>  sodium chloride 1 Gram(s) Oral every 12 hours  sodium chloride 0.65% Nasal 1 Spray(s) Both Nostrils every 6 hours  witch hazel Pads 1 Application(s) Topical every 12 hours    MEDICATIONS  (PRN):      LABS:                        8.6    7.29  )-----------( 166      ( 24 Oct 2024 07:03 )             29.8         CAPILLARY BLOOD GLUCOSE    MICROBIOLOGY:     RADIOLOGY:  [ ] Reviewed and interpreted by me    Mode: AC/ CMV (Assist Control/ Continuous Mandatory Ventilation)  RR (machine): 18  TV (machine): 350  FiO2: 40  PEEP: 5  ITime: 1  MAP: 10  PIP: 31

## 2024-10-24 NOTE — PROGRESS NOTE ADULT - PROBLEM SELECTOR PLAN 10
- Na 130+ on admission; improved to 134 today  - As per daughter pt usually receives 70 cc/hr of free water flushes per hr   - Patient on sodium chloride 1 gm daily at home   - Continue sodium chloride   - Monitor BMP - Na 130+ on admission; improved   - As per daughter pt usually receives 70 cc/hr of free water flushes per hr   - Patient on sodium chloride 1 gm daily at home   - Continue sodium chloride   - Monitor BMP

## 2024-10-24 NOTE — PROGRESS NOTE ADULT - ASSESSMENT
75 yo F PMH T2DM on insulin, HTN, HLD, hypothyroidism, RA on Prednisone, Fibromyalgia, cerebral aneurysm s/p repair, chronic hypercapnic respiratory failure s/p trach (vent dependent) and chronic PEG 2022, recurrent C. diff infection (follows with Dr. 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 S/p antibiotics and s/p GJ tube exchanges on 10/14, s/p PEG replacement 10/21. Endocrine consulted for Type 2 DM management while on tube feeding. Patient is doing well, remains on vent via trach. Patient continues on TFs, now on 12hr TFs from 6A-6P at goal, 80cc/hr tolerating well. Noted hypoglycemia with FBG this am due to change from 24hr TFs to 12hr TFs- will adjust Lantus at 1800 to prevent hypoglycemia. Noted per order, patient to receive full 960mL of TF bag so it may go over 12hrs and continue for 12-14hrs. Will adjust nutritional insulin while on TFs and add 1800 insulin to be given ONLY IF TUBE FEEDS ARE STILL ON AT 1800 given concern for hyperglycemia! Discussed plan with RN and team. Continues on oral Prednisone 5mg once daily. Endocrine will closely monitor BG and adjust insulin as needed for BG goal 100-180mg/dL inpatient.        Home DM medications: Lantus 35 units at HS, Humalog 26 units with # 1 feeding, 22 units with #2 tube feeding, 20 units with #3 tube feeding and  Humalog correction scale (  2 units for -066, 4 units for -250, 6 units for -300, 8units for -350, 10 units for -400, 12 units for -450)   while on [employer] formula 3 cans/day, slow bolus( run at 80 ml/hr total volume 960 ml/day> 1st can around 6:30-8:30, 2nd can around 3 pm, 3rd can around 8-9 pm) plus Banatrol 1/2 packet BID, was increasing to 1 packet BID, plus Kefir 10 oz/day ( divided in multiple times throughout the day).

## 2024-10-25 NOTE — PROGRESS NOTE ADULT - SUBJECTIVE AND OBJECTIVE BOX
DIABETES FOLLOW UP NOTE: Saw pt earlier today    Chief Complaint: Endocrine consult requested for management of DM    INTERVAL HX: pt stable, tolerating [employer] 12 hours at 80cc/hr starting at 6am and ending whenever the TF is completed. Per staff and daughter, TFs usually end between 8 to 9 PM.  Noted BG elevated while pt is on TFs (200s) and coming down overnight to morning time. Hypoglycemic yesterday morning and FBG today in 70s while off TFs and on present insulin doses.        Review of Systems:  General: As above> able to nod yes/no to questions  Cardiovascular: No chest pain, palpitations  Respiratory: No SOB, no cough  GI: No nausea, vomiting, abdominal pain  Endocrine: No polyuria, polydipsia or S&Sx of hypoglycemia    Allergies    walnut (Unknown)  metronidazole (Rash)  Lyrica (Unknown)  penicillin (Unknown)  Pineapple (Unknown)  Tagamet (Unknown)  heparin (Unknown)  Pecans (Unknown)  Hazelnut (Unknown)  meropenem (Rash)    Intolerances      MEDICATIONS:  insulin glargine Injectable (LANTUS) 18 Unit(s) SubCutaneous <User Schedule>  insulin lispro (ADMELOG) corrective regimen sliding scale   SubCutaneous every 6 hours  insulin lispro Injectable (ADMELOG) 20 Unit(s) SubCutaneous <User Schedule>  insulin lispro Injectable (ADMELOG) 10 Unit(s) SubCutaneous <User Schedule>  levothyroxine 125 MICROGram(s) Oral <User Schedule>  prednisoLONE acetate 1% Suspension 1 Drop(s) Both EYES every 6 hours  predniSONE   Tablet 5 milliGRAM(s) Oral <User Schedule>      PHYSICAL EXAM:  VITALS: T(C): 36.6 (10-25-24 @ 14:00)  T(F): 97.9 (10-25-24 @ 14:00), Max: 97.9 (10-24-24 @ 21:37)  HR: 87 (10-25-24 @ 14:00) (66 - 88)  BP: 143/63 (10-25-24 @ 14:00) (134/63 - 164/69)  RR:  (18 - 20)  SpO2:  (96% - 100%)  Wt(kg): --  GENERAL:  Female laying in bed in NAD  HEENT: Trach in place  Abdomen: Soft, nontender, non distended, PEG in place with TF running at 80cc/hr at time of visit  Extremities: Warm, no edema in all 4 exts  NEURO: Alert and able to nod yes/no toquestions    LABS:  POCT Blood Glucose.: 170 mg/dL (10-25-24 @ 11:19)  POCT Blood Glucose.: 151 mg/dL (10-25-24 @ 07:02)  POCT Blood Glucose.: 77 mg/dL (10-25-24 @ 06:02)  POCT Blood Glucose.: 77 mg/dL (10-25-24 @ 05:49)  POCT Blood Glucose.: 309 mg/dL (10-24-24 @ 23:19)  POCT Blood Glucose.: 325 mg/dL (10-24-24 @ 17:47)  POCT Blood Glucose.: 305 mg/dL (10-24-24 @ 11:38)  POCT Blood Glucose.: 297 mg/dL (10-24-24 @ 10:58)  POCT Blood Glucose.: 171 mg/dL (10-24-24 @ 08:38)  POCT Blood Glucose.: 113 mg/dL (10-24-24 @ 06:32)  POCT Blood Glucose.: 194 mg/dL (10-24-24 @ 05:38)  POCT Blood Glucose.: 60 mg/dL (10-24-24 @ 05:10)  POCT Blood Glucose.: 59 mg/dL (10-24-24 @ 05:08)  POCT Blood Glucose.: 274 mg/dL (10-23-24 @ 23:26)  POCT Blood Glucose.: 391 mg/dL (10-23-24 @ 17:22)  POCT Blood Glucose.: 431 mg/dL (10-23-24 @ 14:47)  POCT Blood Glucose.: 360 mg/dL (10-23-24 @ 11:52)  POCT Blood Glucose.: 330 mg/dL (10-23-24 @ 05:44)  POCT Blood Glucose.: 317 mg/dL (10-22-24 @ 23:01)  POCT Blood Glucose.: 285 mg/dL (10-22-24 @ 17:29)                            8.6    7.29  )-----------( 166      ( 24 Oct 2024 07:03 )             29.8       10-25    143  |  106  |  15  ----------------------------<  145[H]  4.9   |  26  |  <0.30[L]    eGFR: 113    Ca    8.8      10-25  Mg     2.4     10-25  Phos  2.6     10-25      Thyroid Function Tests:  10-16 @ 06:50 TSH 1.74 FreeT4 1.2 T3 -- Anti TPO -- Anti Thyroglobulin Ab -- TSI --      A1C with Estimated Average Glucose Result: 5.8 % (10-14-24 @ 05:08)  A1C with Estimated Average Glucose Result: 6.4 % (08-12-24 @ 07:32)      Estimated Average Glucose: 120 mg/dL (10-14-24 @ 05:08)  Estimated Average Glucose: 137 mg/dL (08-12-24 @ 07:32)

## 2024-10-25 NOTE — PROGRESS NOTE ADULT - ASSESSMENT
75 yo F PMH T2DM on insulin, HTN, HLD, hypothyroidism, RA on Prednisone, Fibromyalgia, cerebral aneurysm s/p repair, chronic hypercapnic respiratory failure s/p trach (vent dependent) and chronic PEG 2022, recurrent C. diff infection (follows with Dr. 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  Pt also noted to have rectal bleeding this past week requiring transfusion 5 days ago in ED as per daughter. Daughter also reports brownish discharge from G-J tube. In ED, pt afebrile, fiO2 96-98% on 40% on ventilator.  Chest X-ray w/ opacification of right lung concerning for possible PNA.  Admitted to RCU for further management. Sputum cxs grew PSA; treated with course of Cefepime. Patient with decreased H+H received 1 unit of PRBCS on 10/10.  10/14 EGD w/ Dr. Rosales for PEGJ exchange and clippings placed for closure of fistula site.  Persistent fevers treated with meropenem and vanc (d/c'd 10/20).  CT A/P without infectious source.  Continue airway management with duonebs, chest PT and suction PRN.        10/23:  Feeds and insulin adjusted (uncontrolled BG levels) as per multidisciplinary team discussions involving nutrition, endocrine, nursing, management - final recs are ordered.  Increase in diarrhea - rectal tube placed, meropenem d/c'd.  Unable to sent cdiff specimen per hospital criteria and protocols - pt remains afebrile without leukocytosis.  Will continue to monitor and send specimen if needed.  Sent GI PCR.  Patient improving, discharge planning.  Marysol aware, updated and involved in POC.  Repleted electrolytes.  10/24: Hypoglycemic this morning.  Insulin regimen adjusted by  Endocrine and reviewed with nursing.  Remains with presistent diarrhea.  Cdiff assay was negative.  Will trial immodium. 75 yo F PMH T2DM on insulin, HTN, HLD, hypothyroidism, RA on Prednisone, Fibromyalgia, cerebral aneurysm s/p repair, chronic hypercapnic respiratory failure s/p trach (vent dependent) and chronic PEG 2022, recurrent C. diff infection (follows with Dr. 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  Pt also noted to have rectal bleeding this past week requiring transfusion 5 days ago in ED as per daughter. Daughter also reports brownish discharge from G-J tube. In ED, pt afebrile, fiO2 96-98% on 40% on ventilator.  Chest X-ray w/ opacification of right lung concerning for possible PNA.  Admitted to RCU for further management. Sputum cxs grew PSA; treated with course of Cefepime. Patient with decreased H+H received 1 unit of PRBCS on 10/10.  10/14 EGD w/ Dr. Rosales for PEGJ exchange and clippings placed for closure of fistula site.  Persistent fevers treated with meropenem and vanc (d/c'd 10/20).  CT A/P without infectious source.  Continue airway management with duonebs, chest PT and suction PRN.        10/23:  Feeds and insulin adjusted (uncontrolled BG levels) as per multidisciplinary team discussions involving nutrition, endocrine, nursing, management - final recs are ordered.  Increase in diarrhea - rectal tube placed, meropenem d/c'd.  Unable to sent cdiff specimen per hospital criteria and protocols - pt remains afebrile without leukocytosis.  Will continue to monitor and send specimen if needed.  Sent GI PCR.  Patient improving, discharge planning.  Marysol aware, updated and involved in POC.  Repleted electrolytes.  10/24: Hypoglycemic this morning.  Insulin regimen adjusted by  Endocrine and reviewed with nursing.  Remains with presistent diarrhea.  Cdiff assay was negative.  Will trial immodium.  10/25:  Morning FS is improved compared to yesterday.  Endocrine to make additional adjustments.  Free water increased due ot patient's significant losses via diarrhea. Immodium standing until diarrhea improves.

## 2024-10-25 NOTE — PROGRESS NOTE ADULT - NS ATTEND AMEND GEN_ALL_CORE FT
-    74F PMH DM2 (insulin-dependent), HTN, HLD, hypothyroidism, RA on Prednisone, fibromyalgia, cerebral aneurysm s/p repair, chronic hypoxemia and hypercapnic respiratory failure s/p trach, vent-dependent, recurrent C diff infection, oropharyngeal dysphagia s/p G-J tube with persistent GJ tube leaks now s/p GC fistula repair 8/12, and recent presentation 5 days PTA for rectal bleeding requiring transfusion in the ED who now presents with acute hypoxemic respiratory distress 2/2 RML PNA, admitted to the RCU for further management. Found to have Pseudomonas aeruginosa in sputum culture and urine culture with ESBL E. coli s/p course of meropenem. Course complicated by antibiotic-associated diarrhea.    # Neuro: awake and alert, but with critical illness polyneuropathy. Outpatient f/up for hearing loss. Continue escitalopram and quetiapine. Fentanyl patch for pain along with diclofenac gel prn. Continue gabapentin. Oxycodone prn  # CV: HD stable. Hold off on diuresis today given significant diarrhea. Improved volume overload  # Pulm: continue lung protective ventilation. Pressure support trials as tolerates. Developed rapid shallow breathing with PS 15/5 today. Continue Duonebs  # GI: s/p G-J tube exchange by GI on 10/21. Keep G port for decompression of G-C fistula and J tube for meds and feeds. Tolerating feeds. Continued diarrhea. GI PCR and C diff negative. Continue banana flakes and lactobacillus. Start loperamide ATC for 24 hours, then change to PRN. Monitor stool output  # Renal: stable kidney function and lytes, strict I/O's  # ID: observe off antibiotics. Appreciate ID follow-up. CT A/P unremarkable. Chest CT consistent with multifocal pneumonia. Will need repeat CT chest in 6 weeks to assess for resolution. Wound care follow-up for sacral wound  # Heme: SCD's for DVT ppx, start enoxaparin 30 mg sq daily  # Endo: DM2, continue insulin coverage scale + Lantus qhs. F/up endo recs. Continue levothyroxine  # Rheum: RA, on home prednisone 5 mg daily. Hold off on Enbrel at this time  # Dispo: full code, prognosis guarded, discussed with patient and daughter Marysol d/c planning

## 2024-10-25 NOTE — PHYSICAL THERAPY INITIAL EVALUATION ADULT - PERTINENT HX OF CURRENT PROBLEM, REHAB EVAL
75 y/o F admitted to [facility] on 10/7/24 PMH T2DM on insulin, HTN, HLD, hypothyroidism, RA on Prednisone, Fibromyalgia, cerebral aneurysm s/p repair, chronic hypercapnic respiratory failure s/p trach (vent dependent) and chronic PEG 2022, recurrent C. diff infection (follows with Dr. Newman), persistent GJ tube leaks now s/p GC fistula repair 8/12 BIBEMS with daughter for respiratory distress, hypoxia to high 80s.  Pt also noted to have rectal bleeding this past week requiring transfusion 5 days ago in ED as per daughter.  In ED, pt afebrile, fiO2 96-98% on 40% on ventilator.  Chest Xray w/ opacification of right lung concerning for possible PNA.  Admitted to RCU for further management.

## 2024-10-25 NOTE — PROGRESS NOTE ADULT - PROBLEM SELECTOR PLAN 2
Thyroid Function Tests:  10-16 @ 06:50 TSH 1.74 FreeT4 1.2 T3 -- Anti TPO -- Anti Thyroglobulin Ab -- TSI --  C/w LT4 125mcg day  Please make sure LT4 is given on an empty stomach at least one hour apart from other meds and food to ensure med absorption AND 4 HOURS APART FROM CA/FE/MVI/PPI!!   Noted pt getting LT4 at the time TFs are started. Please set up time for LT4 at 4am   Follow up levels as out patient.

## 2024-10-25 NOTE — PROGRESS NOTE ADULT - SUBJECTIVE AND OBJECTIVE BOX
Patient is a 74y old  Female who presents with a chief complaint of Patient admitted with Hypoxemia and episode of Bright red blood per rectum (24 Oct 2024 13:26)      Interval Events:    REVIEW OF SYSTEMS:  [ ] Positive  [ ] All other systems negative  [ ] Unable to assess ROS because ________    Vital Signs Last 24 Hrs  T(C): 36.3 (10-25-24 @ 05:22), Max: 36.7 (10-24-24 @ 10:03)  T(F): 97.4 (10-25-24 @ 05:22), Max: 98.1 (10-24-24 @ 10:03)  HR: 78 (10-25-24 @ 05:59) (66 - 87)  BP: 148/62 (10-25-24 @ 05:22) (134/63 - 164/69)  RR: 18 (10-25-24 @ 05:22) (18 - 20)  SpO2: 100% (10-25-24 @ 05:59) (98% - 100%)    PHYSICAL EXAM:  HEENT:   [ ]Tracheostomy:  [ ]Pupils equal  [ ]No oral lesions  [ ]Abnormal    SKIN  [ ]No Rash  [ ] Abnormal  [ ] pressure    CARDIAC  [ ]Regular  [ ]Abnormal    PULMONARY  [ ]Bilateral Clear Breath Sounds  [ ]Normal Excursion  [ ]Abnormal    GI  [ ]PEG      [ ] +BS		              [ ]Soft, nondistended, nontender	  [ ]Abnormal    MUSCULOSKELETAL                                   [ ]Bedbound                 [ ]Abnormal    [ ]Ambulatory/OOB to chair                           EXTREMITIES                                         [ ]Normal  [ ]Edema                           NEUROLOGIC  [ ] Normal, non focal  [ ] Focal findings:    PSYCHIATRIC  [ ]Alert and appropriate  [ ] Sedated	 [ ]Agitated    :  Mcbride: [ ] Yes, if yes: Date of Placement:                   [  ] No    LINES: Central Lines [ ] Yes, if yes: Date of Placement                                     [  ] No    HOSPITAL MEDICATIONS:  MEDICATIONS  (STANDING):  acetaminophen   Oral Liquid .. 650 milliGRAM(s) Enteral Tube every 8 hours  albuterol/ipratropium for Nebulization 3 milliLiter(s) Nebulizer every 6 hours  artificial  tears Solution 1 Drop(s) Both EYES every 6 hours  ascorbic acid 500 milliGRAM(s) Oral daily  Biotene Dry Mouth Oral Rinse 5 milliLiter(s) Swish and Spit every 6 hours  calcium carbonate 1250 mG  + Vitamin D (OsCal 500 + D) 1 Tablet(s) Oral <User Schedule>  chlorhexidine 0.12% Liquid 15 milliLiter(s) Oral Mucosa every 12 hours  chlorhexidine 4% Liquid 1 Application(s) Topical daily  dextrose 5%. 1000 milliLiter(s) (50 mL/Hr) IV Continuous <Continuous>  dextrose 5%. 1000 milliLiter(s) (100 mL/Hr) IV Continuous <Continuous>  dextrose 50% Injectable 25 Gram(s) IV Push once  dextrose 50% Injectable 12.5 Gram(s) IV Push once  diclofenac sodium 1% Gel 2 Gram(s) Topical every 6 hours  escitalopram 10 milliGRAM(s) Oral <User Schedule>  fentaNYL   Patch  12 MICROgram(s)/Hr 1 Patch Transdermal every 72 hours  fentaNYL   Patch  25 MICROgram(s)/Hr 1 Patch Transdermal every 72 hours  gabapentin Solution 250 milliGRAM(s) Oral <User Schedule>  glucagon  Injectable 1 milliGRAM(s) IntraMuscular once  hemorrhoidal Ointment 1 Application(s) Rectal at bedtime  influenza  Vaccine (HIGH DOSE) 0.5 milliLiter(s) IntraMuscular once  insulin glargine Injectable (LANTUS) 21 Unit(s) SubCutaneous <User Schedule>  insulin lispro (ADMELOG) corrective regimen sliding scale   SubCutaneous every 6 hours  insulin lispro Injectable (ADMELOG) 20 Unit(s) SubCutaneous <User Schedule>  insulin lispro Injectable (ADMELOG) 10 Unit(s) SubCutaneous <User Schedule>  lactobacillus acidophilus 1 Tablet(s) Oral <User Schedule>  levothyroxine 125 MICROGram(s) Oral daily  lidocaine   4% Patch 1 Patch Transdermal every 24 hours  melatonin Liquid 12 milliGRAM(s) Oral <User Schedule>  nystatin Powder 1 Application(s) Topical every 8 hours  pantoprazole  Injectable 40 milliGRAM(s) IV Push every 12 hours  prednisoLONE acetate 1% Suspension 1 Drop(s) Both EYES every 6 hours  predniSONE   Tablet 5 milliGRAM(s) Oral <User Schedule>  QUEtiapine 12.5 milliGRAM(s) Oral <User Schedule>  simethicone 160 milliGRAM(s) Chew <User Schedule>  sodium chloride 1 Gram(s) Oral every 12 hours  sodium chloride 0.65% Nasal 1 Spray(s) Both Nostrils every 6 hours  witch hazel Pads 1 Application(s) Topical every 12 hours    MEDICATIONS  (PRN):  loperamide Liquid 2 milliGRAM(s) Enteral Tube every 4 hours PRN Diarrhea      LABS:        10-25    143  |  106  |  15  ----------------------------<  145[H]  4.9   |  26  |  <0.30[L]    Ca    8.8      25 Oct 2024 06:48  Phos  2.6     10-25  Mg     2.4     10-25        Urinalysis Basic - ( 25 Oct 2024 06:48 )    Color: x / Appearance: x / SG: x / pH: x  Gluc: 145 mg/dL / Ketone: x  / Bili: x / Urobili: x   Blood: x / Protein: x / Nitrite: x   Leuk Esterase: x / RBC: x / WBC x   Sq Epi: x / Non Sq Epi: x / Bacteria: x          CAPILLARY BLOOD GLUCOSE    MICROBIOLOGY:     RADIOLOGY:  [ ] Reviewed and interpreted by me    Mode: AC/ CMV (Assist Control/ Continuous Mandatory Ventilation)  RR (machine): 18  TV (machine): 350  FiO2: 30  PEEP: 5  ITime: 1  MAP: 10  PIP: 32   Patient is a 74y old  Female who presents with a chief complaint of Patient admitted with Hypoxemia and episode of Bright red blood per rectum (24 Oct 2024 13:26)      Interval Events: No events reported over night.    REVIEW OF SYSTEMS:  [X] Positive:  "I feel hot"  [ ] All other systems negative  [ ] Unable to assess ROS because ________    Vital Signs Last 24 Hrs  T(C): 36.3 (10-25-24 @ 05:22), Max: 36.7 (10-24-24 @ 10:03)  T(F): 97.4 (10-25-24 @ 05:22), Max: 98.1 (10-24-24 @ 10:03)  HR: 78 (10-25-24 @ 05:59) (66 - 87)  BP: 148/62 (10-25-24 @ 05:22) (134/63 - 164/69)  RR: 18 (10-25-24 @ 05:22) (18 - 20)  SpO2: 100% (10-25-24 @ 05:59) (98% - 100%)      PHYSICAL EXAM:  HEENT:   [X]Tracheostomy: #8 distal XLT shiley  [X]Pupils equal  [ ]No oral lesions  [ ]Abnormal    SKIN  [ ]No Rash  [X] Abnormal - IAD, MAD at old PEG site  [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    CARDIAC  [X]Regular  [ ]Abnormal    PULMONARY  [X]Bilateral Clear Breath Sounds  [ ]Normal Excursion  [ ]Abnormal    GI  [X]PEGJ      [X] +BS		              [X]Soft, nondistended, nontender	  [X]Abnormal:  healing stoma/enterocutaneous fistula, site is dry without expressable discharge    MUSCULOSKELETAL                                   [X] Bedbound                 [ ]Abnormal    [ ]Ambulatory/OOB to chair                           EXTREMITIES                                         [ ]Normal  [X]Edema - generalized edema                        NEUROLOGIC  [ ] Normal, non focal  [X] Focal findings: awake, alert, following commands on all 4 extremities     PSYCHIATRIC  [X] Alert and appropriate  [ ] Sedated	 [ ]Agitated    :  Mcbride: [ ] Yes, if yes: Date of Placement:                   [X] No    LINES: Central Lines [ ] Yes, if yes: Date of Placement                                     [X] No      HOSPITAL MEDICATIONS:  MEDICATIONS  (STANDING):  acetaminophen   Oral Liquid .. 650 milliGRAM(s) Enteral Tube every 8 hours  albuterol/ipratropium for Nebulization 3 milliLiter(s) Nebulizer every 6 hours  artificial  tears Solution 1 Drop(s) Both EYES every 6 hours  ascorbic acid 500 milliGRAM(s) Oral daily  Biotene Dry Mouth Oral Rinse 5 milliLiter(s) Swish and Spit every 6 hours  calcium carbonate 1250 mG  + Vitamin D (OsCal 500 + D) 1 Tablet(s) Oral <User Schedule>  chlorhexidine 0.12% Liquid 15 milliLiter(s) Oral Mucosa every 12 hours  chlorhexidine 4% Liquid 1 Application(s) Topical daily  dextrose 5%. 1000 milliLiter(s) (50 mL/Hr) IV Continuous <Continuous>  dextrose 5%. 1000 milliLiter(s) (100 mL/Hr) IV Continuous <Continuous>  dextrose 50% Injectable 25 Gram(s) IV Push once  dextrose 50% Injectable 12.5 Gram(s) IV Push once  diclofenac sodium 1% Gel 2 Gram(s) Topical every 6 hours  escitalopram 10 milliGRAM(s) Oral <User Schedule>  fentaNYL   Patch  12 MICROgram(s)/Hr 1 Patch Transdermal every 72 hours  fentaNYL   Patch  25 MICROgram(s)/Hr 1 Patch Transdermal every 72 hours  gabapentin Solution 250 milliGRAM(s) Oral <User Schedule>  glucagon  Injectable 1 milliGRAM(s) IntraMuscular once  hemorrhoidal Ointment 1 Application(s) Rectal at bedtime  influenza  Vaccine (HIGH DOSE) 0.5 milliLiter(s) IntraMuscular once  insulin glargine Injectable (LANTUS) 21 Unit(s) SubCutaneous <User Schedule>  insulin lispro (ADMELOG) corrective regimen sliding scale   SubCutaneous every 6 hours  insulin lispro Injectable (ADMELOG) 20 Unit(s) SubCutaneous <User Schedule>  insulin lispro Injectable (ADMELOG) 10 Unit(s) SubCutaneous <User Schedule>  lactobacillus acidophilus 1 Tablet(s) Oral <User Schedule>  levothyroxine 125 MICROGram(s) Oral daily  lidocaine   4% Patch 1 Patch Transdermal every 24 hours  melatonin Liquid 12 milliGRAM(s) Oral <User Schedule>  nystatin Powder 1 Application(s) Topical every 8 hours  pantoprazole  Injectable 40 milliGRAM(s) IV Push every 12 hours  prednisoLONE acetate 1% Suspension 1 Drop(s) Both EYES every 6 hours  predniSONE   Tablet 5 milliGRAM(s) Oral <User Schedule>  QUEtiapine 12.5 milliGRAM(s) Oral <User Schedule>  simethicone 160 milliGRAM(s) Chew <User Schedule>  sodium chloride 1 Gram(s) Oral every 12 hours  sodium chloride 0.65% Nasal 1 Spray(s) Both Nostrils every 6 hours  witch hazel Pads 1 Application(s) Topical every 12 hours    MEDICATIONS  (PRN):  loperamide Liquid 2 milliGRAM(s) Enteral Tube every 4 hours PRN Diarrhea      LABS:        10-25    143  |  106  |  15  ----------------------------<  145[H]  4.9   |  26  |  <0.30[L]    Ca    8.8      25 Oct 2024 06:48  Phos  2.6     10-25  Mg     2.4     10-25        Urinalysis Basic - ( 25 Oct 2024 06:48 )    Color: x / Appearance: x / SG: x / pH: x  Gluc: 145 mg/dL / Ketone: x  / Bili: x / Urobili: x   Blood: x / Protein: x / Nitrite: x   Leuk Esterase: x / RBC: x / WBC x   Sq Epi: x / Non Sq Epi: x / Bacteria: x          CAPILLARY BLOOD GLUCOSE    MICROBIOLOGY:     RADIOLOGY:  [ ] Reviewed and interpreted by me    Mode: AC/ CMV (Assist Control/ Continuous Mandatory Ventilation)  RR (machine): 18  TV (machine): 350  FiO2: 30  PEEP: 5  ITime: 1  MAP: 10  PIP: 32

## 2024-10-25 NOTE — PROGRESS NOTE ADULT - PROBLEM SELECTOR PLAN 9
Patient has known hx of prior G-Tube stoma leak   - S/p EGD for closure of Gastric Fistula (8/12) w/ Total of 3 Mantis clips and two 22 mm Microtech clips by GI Dr Rosales   - Old stoma peg site with mild clear drainage s/p repair of fistula  - As per daughter feeds and meds all administered via j-port and g-tube remains to continuously vented for chronic gaseous distention   - 10/14 G-J exchanged by GI and clips applied to fistula site  - 10/17 PEGJ clogged  - 10/21 PEJ revision done, tolerating enteral feeds Patient has known hx of prior G-Tube stoma leak   - S/p EGD for closure of Gastric Fistula (8/12) w/ Total of 3 Mantis clips and two 22 mm Microtech clips by GI Dr Rosales   - Old stoma peg site with mild clear drainage s/p repair of fistula  - As per daughter feeds and meds all administered via j-port and g-tube remains to continuously vented for chronic gaseous distention   - 10/14 G-J exchanged by GI and clips applied to fistula site  - 10/17 PEGJ clogged  - 10/21 PEGJ revision done, tolerating enteral feeds.  Both feeds and medications are now give through feeding/J-port.  G-port is placed to continuous bag drainage for decompression.  Per GI, G-tube can be uysed to give simethicone to improve gas build up.

## 2024-10-25 NOTE — PROGRESS NOTE ADULT - PROBLEM SELECTOR PLAN 1
- Please monitor blood glucose values q 6 hours while on tube feeding or NPO  - Adjust Lantus to 18 units at 6 am instead of HS. Will adjust as needed  - C/w Admelog 20u at 0600, 1200, and 10u at 1800 while on continuous TFs (HOLD if tube feeds held and HOLD 1800 dose if TFs are done at 1800 (6P))  - C/w moderate dose Admelog correctional scale q6h while on TFs/NPO/overnight   -Will follow  Discharge Plan:  - TBD depending on insulin requirement and discharge tube feeding regimen (Bolus vs continuous tube feeding)   - If bolus tube feeding > Lantus plus Humalog   - Patient should have BGs checked 4x a day while on TFs. Please tell patient to contact Endocrinologist if BG <70mg/dL x1 or >200mg/dL consistently or >400mg/dL x1.   - Followup with Dr Ruth: [facility]: [address]. [place] [address]. Tel: [phone]- Telemedicine- daughter to schedule.     Pt will need RX for basal insulin pen (ie. Basaglar, Lantus, Tresiba, Toujeo, Levemir) and bolus insulin pen (ie. humalog/novolog/admelog) depending on insurance coverage; please send test scripts to see which is covered. Please send prescriptions for diabetes supplies (glucometer, test strips, lancets, alcohol swabs, insulin pen needles).

## 2024-10-25 NOTE — PROGRESS NOTE ADULT - ASSESSMENT
73 yo woman PMH T2DM on insulin, HTN, HLD, hypothyroidism, RA on Prednisone, Fibromyalgia, cerebral aneurysm s/p repair, chronic hypercapnic respiratory failure s/p trach (vent dependent) and chronic PEG 2022, recurrent C. diff infection , persistent GJ tube leaks now s/p GC fistula repair 8/12  admitted 10/7/24 with resp distress and found to have RML opacity     Antibiotics  Ceftriaxone 10/7  Azithro 10/7  Cefepime 10/7--> 10/12  meropenem 10/15 --> 10/23  IV Vanco 10/17 --> 10/21  Vanco via NGT 10/24      10/10 melena, anemia, blood tx  10/14 EGD for GJ exchange and piror PEG site closure  One benign-appearing, intrinsic moderate stenosis was found in the upper third of the        esophagus. The stenosis was traversed.       The cardia and gastric fundus were normal.       A gastric tube with tip in the jejunum was found in the gastric body. The PEG required        removal because it was leaking. The J tube extension (12F) was removed first. An externally        removable 24 Fr EndoVive Safety gastrostomy tube was lubricated. The guide wire was passed        through the existing G-tube port and snared endoscopically. The endoscope and snare were then        removed, pulling the wire out through the mouth. The g-tube was tied to the guidewire, pulled        through the mouth into the stomach and then pulled out from the stomach through the skin. The        bumper was attached to the gastrostomy tube. The feeding tube was then cut to an appropriate        length. The final position of the gastrostomy tube was confirmed by relook endoscopy, and        skin marking noted to be 3 cm at the external bumper. The final tension and compression of        the abdominal wall by the PEG tube and external bumper were checked and revealed that the        bumper was moderately tight and mildly deforming the skin. The J tube extension (12 F        EndoVive Jejunal feeding tube) was advanced into the stomach. The tip of the J tube had a        loop thread that was captured with a Resolution clip. The thread and the J tube extension        were advanced to the proximal jejunum, and the endoclip along with the tip of the J tube was        attached to the wall securely. The J tube extension was capped, and the tube site was cleaned        and dressed.       A small fistula was found on the anterior wall of the gastric body related to prior G tube        site. Coagulation of tissue near the fistula using argon plasma at 1.2 liters/minute and 35        peraza was successful. To closure the gastrocutaneous fistula, four hemostatic clips were        successfully placed (MR conditional, two 16 mm DuraClip and 2 Mantis clips.       The examined duodenum was normal.    10/14, 10/15 Fever, transient hypoxia post procedure  10/15 ProCalcitonin = 8.48 suggestive of bacterial process; BCx x2 NGTD; Trach: Pseudomonas   - though benign mg stain  10;16 Leukocytosis WBC = 14.26  10/17 fever, hypotension, abd distension, no diarrhea noted this am WBC = 15.02; Rising Procalcitonin = 38.49; Obstructed J tube   10/18  lost IV access re-gained  - CT scan late this afternoon  WBC = 8.68; Rising Procalcitonin >100  10/18 imaging suggests multifocal pneumonia  10/21 improved chest exam, Procalcitonin level considerably decreased, decreased FiO2  - afebrile since 10/18  10/21 UGI endoscopy done  Findings:       The esophagus was normal. A fistula was found on the anterior wall of the gastric body.       There was evidence of a gastrostomy present in the gastric body. The patient was placed in        the supine position. The endoscope was advanced to the jejunum and the prior placed J tube        with loop attached to the wall and the loop thread was cut by endoclip. The J tube and the G        tube were removed. The gastrostomy fistula was utilized and an Avanos 22 F        Gastrostomy/Jejunal tube was advanced. The jejunal tip was advanced through the pylorus and        into the duodenum. The endoscope was then advanced to the duodenum and the loop thread at the        tip of the J tube was captured and advanced to the proximal jejunum. The loop thread was        clipped to wall by a [employer] Resolution clip. The J tube flushed easily and the G        tube decompress the abdomen easily. The internal balloon was insufflated with 10 cc of water        to hold the GJ tube in place. The outside marking was at 3.  10/23  no distress, liquid stools noted  - Meropenem discontinued  GI PCR NEG  10/24  persistent diarrhea  Cdiff NEG; enteral vanco stopped and Immodium provided    Suggest  monitor off antibiotics    Please call ID if needed over weekend    discussed with ACP    I support discharge home with rectal tube in place if she continues to have liquid diarrhea

## 2024-10-25 NOTE — PROGRESS NOTE ADULT - SUBJECTIVE AND OBJECTIVE BOX
Follow Up:  recent pneumonia    Interval History/ROS:  appears comfortable on vent    Allergies  walnut (Unknown)  metronidazole (Rash)  Lyrica (Unknown)  penicillin (Unknown)  Pineapple (Unknown)  Tagamet (Unknown)  heparin (Unknown)  Pecans (Unknown)  Hazelnut (Unknown)  meropenem (Rash)        ANTIMICROBIALS:      OTHER MEDS:  MEDICATIONS  (STANDING):  acetaminophen   Oral Liquid .. 650 every 8 hours  albuterol/ipratropium for Nebulization 3 every 6 hours  dextrose 50% Injectable 12.5 once  dextrose 50% Injectable 25 once  enoxaparin Injectable 30 <User Schedule>  escitalopram 10 <User Schedule>  fentaNYL   Patch  12 MICROgram(s)/Hr 1 every 72 hours  fentaNYL   Patch  25 MICROgram(s)/Hr 1 every 72 hours  gabapentin Solution 250 <User Schedule>  glucagon  Injectable 1 once  influenza  Vaccine (HIGH DOSE) 0.5 once  insulin glargine Injectable (LANTUS) 18 <User Schedule>  insulin lispro (ADMELOG) corrective regimen sliding scale  every 6 hours  insulin lispro Injectable (ADMELOG) 10 <User Schedule>  insulin lispro Injectable (ADMELOG) 20 <User Schedule>  levothyroxine 125 <User Schedule>  loperamide Liquid 2 every 4 hours  melatonin Liquid 12 <User Schedule>  pantoprazole  Injectable 40 every 12 hours  predniSONE   Tablet 5 <User Schedule>  QUEtiapine 12.5 <User Schedule>  simethicone 160 <User Schedule>      Vital Signs Last 24 Hrs  T(C): 36.6 (25 Oct 2024 14:00), Max: 36.6 (24 Oct 2024 21:37)  T(F): 97.9 (25 Oct 2024 14:00), Max: 97.9 (24 Oct 2024 21:37)  HR: 87 (25 Oct 2024 14:00) (66 - 88)  BP: 143/63 (25 Oct 2024 14:00) (134/63 - 164/69)  BP(mean): --  RR: 18 (25 Oct 2024 14:00) (18 - 20)  SpO2: 100% (25 Oct 2024 14:00) (96% - 100%)    Parameters below as of 25 Oct 2024 11:37  Patient On (Oxygen Delivery Method): ventilator   Fi O2 = 30%        PHYSICAL EXAM:  General:  Non-toxic  Neurology: Awake, nonfocal  Respiratory: Clear to auscultation bilaterally  CV: RRR, S1S2, no murmurs, rubs or gallops  Abdominal: Soft, Non-tender, non-distended, normal bowel sounds  rectal tube in place  - collection bag empty  Extremities: generalized mild edema,   Line Sites: Clear  Skin: No rash                          8.6    7.29  )-----------( 166      ( 24 Oct 2024 07:03 )             29.8       10-25    143  |  106  |  15  ----------------------------<  145[H]  4.9   |  26  |  <0.30[L]    Ca    8.8      25 Oct 2024 06:48  Phos  2.6     10-25  Mg     2.4     10-25    10.23.24 @ 17:56) GI PCR Panel: Rashaad:  (10.24.24 @ 16:39) Clostridium difficile GDH Toxins A&B, EIA: Negative Clostridium difficile GDH Interpretation: Negative for toxigenic C. Difficile. This specimen is negative for C. Difficile glutamate dehydrogenase (GDH) antigen and negative for C. Difficile Toxins A & B, by EIA.        MICROBIOLOGY:  .Blood BLOOD  10-17-24   No growth at 5 days  --  --      .Blood BLOOD  10-17-24   No growth at 5 days  --  --      .Blood BLOOD  10-15-24   No growth at 5 days  --  --      Trach Asp Tracheal Aspirate  10-15-24   Mixed gram negative rods including  Numerous Pseudomonas aeruginosa  Commensal vinay consistent with body site  --  Pseudomonas aeruginosa      .Blood BLOOD  10-15-24   No growth at 5 days  --  --      Trach Asp Tracheal Aspirate  10-08-24   Few Pseudomonas aeruginosa  Commensal vinay consistent with body site  --  Pseudomonas aeruginosa      Clean Catch Clean Catch (Midstream)  10-07-24   10,000 - 49,000 CFU/mL Escherichia coli ESBL  --  Escherichia coli ESBL      .Blood BLOOD  10-07-24   No growth at 5 days  --  --      .Blood BLOOD  10-07-24   No growth at 5 days  --  --      Clean Catch Clean Catch (Midstream)  10-03-24   Culture grew 3 or more types of organisms which indicate  collection contamination; consider recollection only if clinically  indicated.  --  --          v      Clostridium difficile GDH Toxins A&amp;B, EIA:   Negative (10-24-24 @ 16:39)  Clostridium difficile GDH Interpretation: Negative for toxigenic C. Difficile.  This specimen is negative for C.  Difficile glutamate dehydrogenase (GDH) antigen and negative for C.  Difficile Toxins A & B, by EIA.  GDH is a highly sensitive screening  marker for C. Difficile that is produced in large amounts by all C.  Difficile strains, both toxigenic and nontoxigenic.  This assay has not  been validated as a test of cure.  Repeat testing during the same episode  of diarrhea is of limited value and is discouraged.  The results of this  assay should always be interpreted in conjunction with patient's clinical  history. (10-24-24 @ 16:39)      RADIOLOGY:    Zion Newman MD; Division of Infectious Disease; Pager: [phone]; nights and weekends: [phone]

## 2024-10-25 NOTE — PROGRESS NOTE ADULT - PROBLEM SELECTOR PLAN 10
- Na 130+ on admission; improved   - As per daughter pt usually receives 70 cc/hr of free water flushes per hr   - Patient on sodium chloride 1 gm daily at home   - Continue sodium chloride   - Monitor BMP - Na 130+ on admission; improved   - As per daughter pt usually receives 70 cc/hr of free water flushes per hr however has been reduced during inpatient stay.

## 2024-10-25 NOTE — PROGRESS NOTE ADULT - NUTRITIONAL ASSESSMENT
Diet, NPO with Tube Feed:   Tube Feeding Modality: Jejunostomy  Casie Reactful Peptide 1.5 (KFPEPT1.5RTH)  Total Volume for 24 Hours (mL): 960  Continuous  Until Goal Tube Feed Rate (mL per Hour): 80  Tube Feed Duration (in Hours): 12  Tube Feed Start Time: 06:00  Free Water Flush  Pump   Rate (mL per Hour): 50   Frequency: Every Hour  Free Water Flush Instructions:  50ml free water flushes Q1hr  Alfred(7 Gm Arginine/7 Gm Glut/1.2 Gm HMB     Qty per Day:  1  No Carb Prosource (1pkg = 15gms Protein)     Qty per Day:  1  Banatrol TF     Qty per Day:  1/2 packet per daily (10-25-24 @ 10:29) [Active]      Please see RD assessment and/or follow up.  Managed by primary team as well

## 2024-10-25 NOTE — PHYSICAL THERAPY INITIAL EVALUATION ADULT - ADDITIONAL COMMENTS
Pt lives with daughter. Pt is vent dependent, and bed bound. Pt has no use of B/L UE except slight squeeze of fingers. Pt with AROM/AAROM of b/l LE in supine although not functional.  Pt has all Durable Medical Equipment in the home and home w/ aide assistance

## 2024-10-25 NOTE — PROGRESS NOTE ADULT - ASSESSMENT
73 yo F PMH T2DM on insulin, HTN, HLD, hypothyroidism, RA on Prednisone, Fibromyalgia, cerebral aneurysm s/p repair, chronic hypercapnic respiratory failure s/p trach (vent dependent) and chronic PEG 2022, recurrent C. diff infection (follows with Dr. 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 S/p antibiotics and s/p GJ tube exchanges on 10/14, s/p PEG replacement 10/21. Endocrine consulted for Type 2 DM management while on tube feeding. Patient is doing well, remains on vent via trach. Patient continues on TFs of Auth0 at 80cc/hr starting at 6A but ending whenever volume is completed which is 8 to 9 pm. No hypoglycemia but still having FBG <100s while off TFs.  Also on chronic Prednisone 5mg once daily. Spoke with pt's daughter and reviewed insulin doses adjustments to improve BG during the day and prevent BG coming down during the night. Will move Lantus insulin to morning time at start of TFs to assist with daytime BG levels and will continue to adjust insulin to BG goal 100-180mg/dL without hypoglycemia.         Home DM medications: Lantus 35 units at HS, Humalog 26 units with # 1 feeding, 22 units with #2 tube feeding, 20 units with #3 tube feeding and  Humalog correction scale (  2 units for -516, 4 units for -250, 6 units for -300, 8units for -350, 10 units for -400, 12 units for -450)   while on [employer] formula 3 cans/day, slow bolus( run at 80 ml/hr total volume 960 ml/day> 1st can around 6:30-8:30, 2nd can around 3 pm, 3rd can around 8-9 pm) plus Banatrol 1/2 packet BID, was increasing to 1 packet BID, plus Kefir 10 oz/day ( divided in multiple times throughout the day).

## 2024-10-25 NOTE — PROVIDER CONTACT NOTE (OTHER) - ACTION/TREATMENT ORDERED:
Hold 6AM insulin until repeat fingerstick at 7AM. Contact provider with result to see if patient will receive 20 units insulin Hold 6AM insulin until repeat fingerstick at 7AM. Contact provider with result to see if patient will receive 20 units insulin, pt repeat  ok to give 20 units insulin

## 2024-10-26 NOTE — PROGRESS NOTE ADULT - PROBLEM SELECTOR PLAN 9
Patient has known hx of prior G-Tube stoma leak   - S/p EGD for closure of Gastric Fistula (8/12) w/ Total of 3 Mantis clips and two 22 mm Microtech clips by GI Dr Rosales   - Old stoma peg site with mild clear drainage s/p repair of fistula  - As per daughter feeds and meds all administered via j-port and g-tube remains to continuously vented for chronic gaseous distention   - 10/14 G-J exchanged by GI and clips applied to fistula site  - 10/17 PEGJ clogged  - 10/21 PEGJ revision done, tolerating enteral feeds.  Both feeds and medications are now give through feeding/J-port.  G-port is placed to continuous bag drainage for decompression.  Per GI, G-tube can be uysed to give simethicone to improve gas build up.

## 2024-10-26 NOTE — PROGRESS NOTE ADULT - ASSESSMENT
75 yo F PMH T2DM on insulin, HTN, HLD, hypothyroidism, RA on Prednisone, Fibromyalgia, cerebral aneurysm s/p repair, chronic hypercapnic respiratory failure s/p trach (vent dependent) and chronic PEG 2022, recurrent C. diff infection (follows with Dr. 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  Pt also noted to have rectal bleeding this past week requiring transfusion 5 days ago in ED as per daughter. Daughter also reports brownish discharge from G-J tube. In ED, pt afebrile, fiO2 96-98% on 40% on ventilator.  Chest X-ray w/ opacification of right lung concerning for possible PNA.  Admitted to RCU for further management. Sputum cxs grew PSA; treated with course of Cefepime. Patient with decreased H+H received 1 unit of PRBCS on 10/10.  10/14 EGD w/ Dr. Rosales for PEGJ exchange and clippings placed for closure of fistula site.  Persistent fevers treated with meropenem and vanc (d/c'd 10/20).  CT A/P without infectious source.  Continue airway management with duonebs, chest PT and suction PRN.        10/23:  Feeds and insulin adjusted (uncontrolled BG levels) as per multidisciplinary team discussions involving nutrition, endocrine, nursing, management - final recs are ordered.  Increase in diarrhea - rectal tube placed, meropenem d/c'd.  Unable to sent cdiff specimen per hospital criteria and protocols - pt remains afebrile without leukocytosis.  Will continue to monitor and send specimen if needed.  Sent GI PCR.  Patient improving, discharge planning.  Marysol aware, updated and involved in POC.  Repleted electrolytes.  10/24: Hypoglycemic this morning.  Insulin regimen adjusted by  Endocrine and reviewed with nursing.  Remains with presistent diarrhea.  Cdiff assay was negative.  Will trial immodium.  10/25:  Morning FS is improved compared to yesterday.  Endocrine to make additional adjustments.  Free water increased due ot patient's significant losses via diarrhea. Immodium standing until diarrhea improves.  75 yo F PMH T2DM on insulin, HTN, HLD, hypothyroidism, RA on Prednisone, Fibromyalgia, cerebral aneurysm s/p repair, chronic hypercapnic respiratory failure s/p trach (vent dependent) and chronic PEG 2022, recurrent C. diff infection (follows with Dr. 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  Pt also noted to have rectal bleeding this past week requiring transfusion 5 days ago in ED as per daughter. Daughter also reports brownish discharge from G-J tube. In ED, pt afebrile, fiO2 96-98% on 40% on ventilator.  Chest X-ray w/ opacification of right lung concerning for possible PNA.  Admitted to RCU for further management. Sputum cxs grew PSA; treated with course of Cefepime. Patient with decreased H+H received 1 unit of PRBCS on 10/10.  10/14 EGD w/ Dr. Rosales for PEGJ exchange and clippings placed for closure of fistula site.  Persistent fevers treated with meropenem and vanc (d/c'd 10/20).  CT A/P without infectious source.  Continue airway management with duonebs, chest PT and suction PRN.        10/23:  Feeds and insulin adjusted (uncontrolled BG levels) as per multidisciplinary team discussions involving nutrition, endocrine, nursing, management - final recs are ordered.  Increase in diarrhea - rectal tube placed, meropenem d/c'd.  Unable to sent cdiff specimen per hospital criteria and protocols - pt remains afebrile without leukocytosis.  Will continue to monitor and send specimen if needed.  Sent GI PCR.  Patient improving, discharge planning.  Marysol aware, updated and involved in POC.  Repleted electrolytes.  10/24: Hypoglycemic this morning.  Insulin regimen adjusted by  Endocrine and reviewed with nursing.  Remains with persistent diarrhea.  Cdiff assay was negative.  Will trial imodium.  10/25:  Morning FS is improved compared to yesterday.  Endocrine to make additional adjustments.  Free water increased due ot patient's significant losses via diarrhea. Imodium standing until diarrhea improves.   10/26:  Diarrhea output decreased compared to day prior, will continue imodium standing.  Simethicone changed to maalox TID via G-port to address patient's complaints of stomach burning.

## 2024-10-26 NOTE — PROGRESS NOTE ADULT - PROBLEM SELECTOR PLAN 10
- Na 130+ on admission; improved   - As per daughter pt usually receives 70 cc/hr of free water flushes per hr however has been reduced during inpatient stay.

## 2024-10-26 NOTE — CHART NOTE - NSCHARTNOTEFT_GEN_A_CORE
MAXX LOPEZ  Gender: Female  74y  [facility] RCU1 [id number]    Patient not seen today, chart reviewed.     POCT Blood Glucose:  150 mg/dL (10-26-24 @ 06:54)  214 mg/dL (10-25-24 @ 23:31)  286 mg/dL (10-25-24 @ 18:46)  170 mg/dL (10-25-24 @ 11:19)      eMAR:  insulin glargine Injectable (LANTUS)   18 Unit(s) SubCutaneous (10-26-24 @ 06:55)    insulin lispro (ADMELOG) corrective regimen sliding scale   0 Unit(s) SubCutaneous (10-26-24 @ 06:56)   2 Unit(s) SubCutaneous (10-25-24 @ 23:45)   3 Unit(s) SubCutaneous (10-25-24 @ 18:48)   1 Unit(s) SubCutaneous (10-25-24 @ 12:15)    insulin lispro Injectable (ADMELOG)   20 Unit(s) SubCutaneous (10-26-24 @ 06:57)   20 Unit(s) SubCutaneous (10-25-24 @ 12:16)    insulin lispro Injectable (ADMELOG)   10 Unit(s) SubCutaneous (10-25-24 @ 18:49)    levothyroxine   125 MICROGram(s) Oral (10-26-24 @ 03:13)    predniSONE   Tablet   5 milliGRAM(s) Oral (10-26-24 @ 09:19)    Endocrinology following patient for T2DM management. In the last 24hrs BG levels have been 150-286mg/dL. FBG stable this am at 150mg/dL. No hypoglycemia. Noted hyperglycemia to 200s while on enteral feeds. Patient remains on TFs [employer] Peptide 1.5 @80cc/hr, goal, given over span of 12-14hrs starting at 0600, whenever 960mL is done. Will adjust Admelog doses while on tube feeds for goal 100-180mg/dL. Endocrine will closely monitor BG levels.     Diet, NPO with Tube Feed:   Tube Feeding Modality: Jejunostomy  [employer] Peptide 1.5 (KFPEPT1.5RTH)  Total Volume for 24 Hours (mL): 960  Continuous  Until Goal Tube Feed Rate (mL per Hour): 80  Tube Feed Duration (in Hours): 12  Tube Feed Start Time: 06:00  Free Water Flush  Pump   Rate (mL per Hour): 50   Frequency: Every Hour  Free Water Flush Instructions:  50ml free water flushes Q1hr  Alfred(7 Gm Arginine/7 Gm Glut/1.2 Gm HMB     Qty per Day:  1  No Carb Prosource (1pkg = 15gms Protein)     Qty per Day:  1  Banatrol TF     Qty per Day:  1/2 packet per daily (10-25-24 @ 10:29) [Active]

## 2024-10-26 NOTE — PROGRESS NOTE ADULT - NS ATTEND AMEND GEN_ALL_CORE FT
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 Found to have Pseudomonas aeruginosa in sputum culture and urine culture with ESBL E. coli s/p course of meropenem. Course complicated by antibiotic-associated diarrhea.    # Neuro: awake and alert, but with critical illness polyneuropathy. Outpatient f/up for hearing loss. Continue escitalopram and quetiapine. Fentanyl patch for pain along with diclofenac gel prn. Continue gabapentin. Oxycodone prn  # CV: HD stable. Hold off on diuresis today given significant diarrhea. Improved volume overload  # Pulm: continue lung protective ventilation. Pressure support trials as tolerates. Developed rapid shallow breathing with PS 15/5 today. Continue Duonebs  # GI: s/p G-J tube exchange by GI on 10/21. Keep G port for decompression of G-C fistula and J tube for meds and feeds. Tolerating feeds. Continued diarrhea. GI PCR and C diff negative. Continue banana flakes and lactobacillus. Start loperamide ATC for 24 hours, then change to PRN. Monitor stool output.   # Renal: stable kidney function and lytes, strict I/O's  # ID: observe off antibiotics. Appreciate ID follow-up. CT A/P unremarkable. Chest CT consistent with multifocal pneumonia. Will need repeat CT chest in 6 weeks to assess for resolution. Wound care follow-up for sacral wound  # Heme: SCD's for DVT ppx, start enoxaparin 30 mg sq daily; mild leukocytosis however no fevers. monitor off abx  # Endo: DM2, continue insulin coverage scale + Lantus qhs. F/up endo recs. Continue levothyroxine  # Rheum: RA, on home prednisone 5 mg daily. Hold off on Enbrel at this time  # Dispo: full code, prognosis guarded, discussed with patient and daughter Marysol, d/c planning.

## 2024-10-26 NOTE — PROGRESS NOTE ADULT - SUBJECTIVE AND OBJECTIVE BOX
Patient is a 74y old  Female who presents with a chief complaint of Patient admitted with Hypoxemia and episode of Bright red blood per rectum (25 Oct 2024 14:54)      Interval Events:    REVIEW OF SYSTEMS:  [ ] Positive  [ ] All other systems negative  [ ] Unable to assess ROS because ________    Vital Signs Last 24 Hrs  T(C): 36.9 (10-26-24 @ 04:39), Max: 36.9 (10-26-24 @ 00:01)  T(F): 98.4 (10-26-24 @ 04:39), Max: 98.4 (10-26-24 @ 00:01)  HR: 69 (10-26-24 @ 06:00) (69 - 88)  BP: 110/58 (10-26-24 @ 04:39) (110/58 - 152/62)  RR: 18 (10-26-24 @ 04:39) (18 - 18)  SpO2: 100% (10-26-24 @ 06:00) (96% - 100%)    PHYSICAL EXAM:  HEENT:   [ ]Tracheostomy:  [ ]Pupils equal  [ ]No oral lesions  [ ]Abnormal    SKIN  [ ]No Rash  [ ] Abnormal  [ ] pressure    CARDIAC  [ ]Regular  [ ]Abnormal    PULMONARY  [ ]Bilateral Clear Breath Sounds  [ ]Normal Excursion  [ ]Abnormal    GI  [ ]PEG      [ ] +BS		              [ ]Soft, nondistended, nontender	  [ ]Abnormal    MUSCULOSKELETAL                                   [ ]Bedbound                 [ ]Abnormal    [ ]Ambulatory/OOB to chair                           EXTREMITIES                                         [ ]Normal  [ ]Edema                           NEUROLOGIC  [ ] Normal, non focal  [ ] Focal findings:    PSYCHIATRIC  [ ]Alert and appropriate  [ ] Sedated	 [ ]Agitated    :  Mcbride: [ ] Yes, if yes: Date of Placement:                   [  ] No    LINES: Central Lines [ ] Yes, if yes: Date of Placement                                     [  ] No    HOSPITAL MEDICATIONS:  MEDICATIONS  (STANDING):  acetaminophen   Oral Liquid .. 650 milliGRAM(s) Enteral Tube every 8 hours  albuterol/ipratropium for Nebulization 3 milliLiter(s) Nebulizer every 6 hours  artificial  tears Solution 1 Drop(s) Both EYES every 6 hours  ascorbic acid 500 milliGRAM(s) Oral daily  Biotene Dry Mouth Oral Rinse 5 milliLiter(s) Swish and Spit every 6 hours  calcium carbonate 1250 mG  + Vitamin D (OsCal 500 + D) 1 Tablet(s) Oral <User Schedule>  chlorhexidine 0.12% Liquid 15 milliLiter(s) Oral Mucosa every 12 hours  chlorhexidine 4% Liquid 1 Application(s) Topical daily  dextrose 5%. 1000 milliLiter(s) (100 mL/Hr) IV Continuous <Continuous>  dextrose 5%. 1000 milliLiter(s) (50 mL/Hr) IV Continuous <Continuous>  dextrose 50% Injectable 25 Gram(s) IV Push once  dextrose 50% Injectable 12.5 Gram(s) IV Push once  diclofenac sodium 1% Gel 2 Gram(s) Topical every 6 hours  enoxaparin Injectable 30 milliGRAM(s) SubCutaneous <User Schedule>  escitalopram 10 milliGRAM(s) Oral <User Schedule>  fentaNYL   Patch  12 MICROgram(s)/Hr 1 Patch Transdermal every 72 hours  fentaNYL   Patch  25 MICROgram(s)/Hr 1 Patch Transdermal every 72 hours  gabapentin Solution 250 milliGRAM(s) Oral <User Schedule>  glucagon  Injectable 1 milliGRAM(s) IntraMuscular once  hemorrhoidal Ointment 1 Application(s) Rectal at bedtime  influenza  Vaccine (HIGH DOSE) 0.5 milliLiter(s) IntraMuscular once  insulin glargine Injectable (LANTUS) 18 Unit(s) SubCutaneous <User Schedule>  insulin lispro (ADMELOG) corrective regimen sliding scale   SubCutaneous every 6 hours  insulin lispro Injectable (ADMELOG) 20 Unit(s) SubCutaneous <User Schedule>  insulin lispro Injectable (ADMELOG) 10 Unit(s) SubCutaneous <User Schedule>  lactobacillus acidophilus 1 Tablet(s) Oral <User Schedule>  levothyroxine 125 MICROGram(s) Oral <User Schedule>  lidocaine   4% Patch 4 Patch Transdermal every 24 hours  loperamide Liquid 2 milliGRAM(s) Enteral Tube every 4 hours  melatonin Liquid 12 milliGRAM(s) Oral <User Schedule>  nystatin Powder 1 Application(s) Topical every 8 hours  pantoprazole  Injectable 40 milliGRAM(s) IV Push every 12 hours  prednisoLONE acetate 1% Suspension 1 Drop(s) Both EYES every 6 hours  predniSONE   Tablet 5 milliGRAM(s) Oral <User Schedule>  QUEtiapine 12.5 milliGRAM(s) Oral <User Schedule>  simethicone 160 milliGRAM(s) Chew <User Schedule>  sodium chloride 1 Gram(s) Oral every 12 hours  sodium chloride 0.65% Nasal 1 Spray(s) Both Nostrils every 6 hours  witch hazel Pads 1 Application(s) Topical every 12 hours    MEDICATIONS  (PRN):      LABS:                        8.3    13.09 )-----------( 172      ( 26 Oct 2024 06:42 )             29.7     10-26    140  |  107  |  22  ----------------------------<  140[H]  4.5   |  25  |  <0.30[L]    Ca    8.4      26 Oct 2024 06:43  Phos  3.3     10-26  Mg     2.2     10-26        Urinalysis Basic - ( 26 Oct 2024 06:43 )    Color: x / Appearance: x / SG: x / pH: x  Gluc: 140 mg/dL / Ketone: x  / Bili: x / Urobili: x   Blood: x / Protein: x / Nitrite: x   Leuk Esterase: x / RBC: x / WBC x   Sq Epi: x / Non Sq Epi: x / Bacteria: x          CAPILLARY BLOOD GLUCOSE    MICROBIOLOGY:     RADIOLOGY:  [ ] Reviewed and interpreted by me    Mode: AC/ CMV (Assist Control/ Continuous Mandatory Ventilation)  RR (machine): 18  TV (machine): 350  FiO2: 30  PEEP: 5  ITime: 1  MAP: 9  PIP: 21   Patient is a 74y old  Female who presents with a chief complaint of Patient admitted with Hypoxemia and episode of Bright red blood per rectum (25 Oct 2024 14:54)      Interval Events: No events reported over night.     REVIEW OF SYSTEMS:  [X] Positive:  Stomach burning  [ ] All other systems negative  [ ] Unable to assess ROS because ________    Vital Signs Last 24 Hrs  T(C): 36.9 (10-26-24 @ 04:39), Max: 36.9 (10-26-24 @ 00:01)  T(F): 98.4 (10-26-24 @ 04:39), Max: 98.4 (10-26-24 @ 00:01)  HR: 69 (10-26-24 @ 06:00) (69 - 88)  BP: 110/58 (10-26-24 @ 04:39) (110/58 - 152/62)  RR: 18 (10-26-24 @ 04:39) (18 - 18)  SpO2: 100% (10-26-24 @ 06:00) (96% - 100%)      PHYSICAL EXAM:  HEENT:   [X]Tracheostomy: #8 distal XLT shiley  [X]Pupils equal  [ ]No oral lesions  [ ]Abnormal    SKIN  [ ]No Rash  [X] Abnormal - IAD, MAD at old PEG site  [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    CARDIAC  [X]Regular  [ ]Abnormal    PULMONARY  [X]Bilateral Clear Breath Sounds  [ ]Normal Excursion  [ ]Abnormal    GI  [X]PEGJ      [X] +BS		              [X]Soft, nondistended, nontender	  [X]Abnormal:  healing stoma/enterocutaneous fistula, site is dry without expressable discharge    MUSCULOSKELETAL                                   [X] Bedbound                 [ ]Abnormal    [ ]Ambulatory/OOB to chair                           EXTREMITIES                                         [ ]Normal  [X]Edema - generalized edema 1+                 NEUROLOGIC  [ ] Normal, non focal  [X] Focal findings: awake, alert, following commands on all 4 extremities     PSYCHIATRIC  [X] Alert and appropriate  [ ] Sedated	 [ ]Agitated    :  Mcbride: [ ] Yes, if yes: Date of Placement:                   [X] No    LINES: Central Lines [ ] Yes, if yes: Date of Placement                                     [X] No        HOSPITAL MEDICATIONS:  MEDICATIONS  (STANDING):  acetaminophen   Oral Liquid .. 650 milliGRAM(s) Enteral Tube every 8 hours  albuterol/ipratropium for Nebulization 3 milliLiter(s) Nebulizer every 6 hours  artificial  tears Solution 1 Drop(s) Both EYES every 6 hours  ascorbic acid 500 milliGRAM(s) Oral daily  Biotene Dry Mouth Oral Rinse 5 milliLiter(s) Swish and Spit every 6 hours  calcium carbonate 1250 mG  + Vitamin D (OsCal 500 + D) 1 Tablet(s) Oral <User Schedule>  chlorhexidine 0.12% Liquid 15 milliLiter(s) Oral Mucosa every 12 hours  chlorhexidine 4% Liquid 1 Application(s) Topical daily  dextrose 5%. 1000 milliLiter(s) (100 mL/Hr) IV Continuous <Continuous>  dextrose 5%. 1000 milliLiter(s) (50 mL/Hr) IV Continuous <Continuous>  dextrose 50% Injectable 25 Gram(s) IV Push once  dextrose 50% Injectable 12.5 Gram(s) IV Push once  diclofenac sodium 1% Gel 2 Gram(s) Topical every 6 hours  enoxaparin Injectable 30 milliGRAM(s) SubCutaneous <User Schedule>  escitalopram 10 milliGRAM(s) Oral <User Schedule>  fentaNYL   Patch  12 MICROgram(s)/Hr 1 Patch Transdermal every 72 hours  fentaNYL   Patch  25 MICROgram(s)/Hr 1 Patch Transdermal every 72 hours  gabapentin Solution 250 milliGRAM(s) Oral <User Schedule>  glucagon  Injectable 1 milliGRAM(s) IntraMuscular once  hemorrhoidal Ointment 1 Application(s) Rectal at bedtime  influenza  Vaccine (HIGH DOSE) 0.5 milliLiter(s) IntraMuscular once  insulin glargine Injectable (LANTUS) 18 Unit(s) SubCutaneous <User Schedule>  insulin lispro (ADMELOG) corrective regimen sliding scale   SubCutaneous every 6 hours  insulin lispro Injectable (ADMELOG) 20 Unit(s) SubCutaneous <User Schedule>  insulin lispro Injectable (ADMELOG) 10 Unit(s) SubCutaneous <User Schedule>  lactobacillus acidophilus 1 Tablet(s) Oral <User Schedule>  levothyroxine 125 MICROGram(s) Oral <User Schedule>  lidocaine   4% Patch 4 Patch Transdermal every 24 hours  loperamide Liquid 2 milliGRAM(s) Enteral Tube every 4 hours  melatonin Liquid 12 milliGRAM(s) Oral <User Schedule>  nystatin Powder 1 Application(s) Topical every 8 hours  pantoprazole  Injectable 40 milliGRAM(s) IV Push every 12 hours  prednisoLONE acetate 1% Suspension 1 Drop(s) Both EYES every 6 hours  predniSONE   Tablet 5 milliGRAM(s) Oral <User Schedule>  QUEtiapine 12.5 milliGRAM(s) Oral <User Schedule>  simethicone 160 milliGRAM(s) Chew <User Schedule>  sodium chloride 1 Gram(s) Oral every 12 hours  sodium chloride 0.65% Nasal 1 Spray(s) Both Nostrils every 6 hours  witch hazel Pads 1 Application(s) Topical every 12 hours    MEDICATIONS  (PRN):      LABS:                        8.3    13.09 )-----------( 172      ( 26 Oct 2024 06:42 )             29.7     10-26    140  |  107  |  22  ----------------------------<  140[H]  4.5   |  25  |  <0.30[L]    Ca    8.4      26 Oct 2024 06:43  Phos  3.3     10-26  Mg     2.2     10-26        Urinalysis Basic - ( 26 Oct 2024 06:43 )    Color: x / Appearance: x / SG: x / pH: x  Gluc: 140 mg/dL / Ketone: x  / Bili: x / Urobili: x   Blood: x / Protein: x / Nitrite: x   Leuk Esterase: x / RBC: x / WBC x   Sq Epi: x / Non Sq Epi: x / Bacteria: x          CAPILLARY BLOOD GLUCOSE    MICROBIOLOGY:     RADIOLOGY:  [ ] Reviewed and interpreted by me    Mode: AC/ CMV (Assist Control/ Continuous Mandatory Ventilation)  RR (machine): 18  TV (machine): 350  FiO2: 30  PEEP: 5  ITime: 1  MAP: 9  PIP: 21

## 2024-10-27 NOTE — PROGRESS NOTE ADULT - ASSESSMENT
73 yo F PMH T2DM on insulin, HTN, HLD, hypothyroidism, RA on Prednisone, Fibromyalgia, cerebral aneurysm s/p repair, chronic hypercapnic respiratory failure s/p trach (vent dependent) and chronic PEG 2022, recurrent C. diff infection (follows with Dr. 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 S/p antibiotics and s/p GJ tube exchanges on 10/14, s/p PEG replacement 10/21. Endocrine consulted for Type 2 DM management while on tube feeding. Patient is doing well, remains on vent via trach. Patient continues on TFs of [employer] at 80cc/hr starting at 6A but ending whenever volume is completed which is 8 to 9 pm; tolerating TFs well. No hypoglycemia. FBG <100mg/dL while TFs off but stable. Given risk for hyperglycemia with TFs and nutritional insulin held for 0600 when TFs re-started by primary team, ordered STAT Admelog 12u to be given but RN unable to given until 1100 which at that time was too close to 1200 nutritional insulin. Noted BG at 1200 stable even though NPH held at 0600 by primary team. Will d/c NPH for 0600 for now. Will increase NPH with 1200 given hyperglycemia to 300s at 1800. Endocrine will closely monitor BG and adjust insulin as needed for BG goal 100-180mg/dL inpatient. Please let Endocrine know of any changes to TF rate or formula.       Home DM medications: Lantus 35 units at HS, Humalog 26 units with # 1 feeding, 22 units with #2 tube feeding, 20 units with #3 tube feeding and  Humalog correction scale (  2 units for -481, 4 units for -250, 6 units for -300, 8units for -350, 10 units for -400, 12 units for -450)   while on [employer] formula 3 cans/day, slow bolus( run at 80 ml/hr total volume 960 ml/day> 1st can around 6:30-8:30, 2nd can around 3 pm, 3rd can around 8-9 pm) plus Banatrol 1/2 packet BID, was increasing to 1 packet BID, plus Kefir 10 oz/day ( divided in multiple times throughout the day).                         73 yo F PMH T2DM on insulin, HTN, HLD, hypothyroidism, RA on Prednisone, Fibromyalgia, cerebral aneurysm s/p repair, chronic hypercapnic respiratory failure s/p trach (vent dependent) and chronic PEG 2022, recurrent C. diff infection (follows with Dr. 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 S/p antibiotics and s/p GJ tube exchanges on 10/14, s/p PEG replacement 10/21. Endocrine consulted for Type 2 DM management while on tube feeding. Patient is doing well, remains on vent via trach. Patient continues on TFs of [employer] at 80cc/hr starting at 6A but ending whenever volume is completed which is 8 to 9 pm; tolerating TFs well. No hypoglycemia. FBG <100mg/dL while TFs off but stable. Given risk for hyperglycemia with TFs and nutritional insulin held for 0600 when TFs re-started by primary team, ordered STAT Admelog 12u to be given but RN unable to given until 1100 which at that time was too close to 1200 nutritional insulin, so order discontinued and communicated with RN to just give 1200 Admelog dose. Noted BG at 1200 stable even though NPH held at 0600 by primary team. Will d/c NPH for 0600 for now. Will increase NPH with 1200 given hyperglycemia to 300s at 1800. Endocrine will closely monitor BG and adjust insulin as needed for BG goal 100-180mg/dL inpatient. Please let Endocrine know of any changes to TF rate or formula.       Home DM medications: Lantus 35 units at HS, Humalog 26 units with # 1 feeding, 22 units with #2 tube feeding, 20 units with #3 tube feeding and  Humalog correction scale (  2 units for -214, 4 units for -250, 6 units for -300, 8units for -350, 10 units for -400, 12 units for -450)   while on [employer] formula 3 cans/day, slow bolus( run at 80 ml/hr total volume 960 ml/day> 1st can around 6:30-8:30, 2nd can around 3 pm, 3rd can around 8-9 pm) plus Banatrol 1/2 packet BID, was increasing to 1 packet BID, plus Kefir 10 oz/day ( divided in multiple times throughout the day).

## 2024-10-27 NOTE — PROGRESS NOTE ADULT - NS ATTEND AMEND GEN_ALL_CORE FT
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 Found to have Pseudomonas aeruginosa in sputum culture and urine culture with ESBL E. coli s/p course of meropenem. Course complicated by antibiotic-associated diarrhea.    # Neuro: awake and alert, but with critical illness polyneuropathy. Outpatient f/up for hearing loss. Continue escitalopram and quetiapine. Fentanyl patch for pain along with diclofenac gel prn. Continue gabapentin. Oxycodone prn  # CV: HD stable. Hold off on diuresis today given significant diarrhea. Improved volume overload  # Pulm: continue lung protective ventilation. Pressure support trials as tolerates. Developed rapid shallow breathing with PS 15/5 today. Continue Duonebs  # GI: s/p G-J tube exchange by GI on 10/21. Keep G port for decompression of G-C fistula and J tube for meds and feeds. Tolerating feeds. Continued diarrhea. GI PCR and C diff negative. Continue banana flakes and lactobacillus. Start loperamide ATC for 24 hours, then change to PRN. Monitor stool output.   # Renal: stable kidney function and lytes, strict I/O's  # ID: observe off antibiotics. Appreciate ID follow-up. CT A/P unremarkable. Chest CT consistent with multifocal pneumonia. Will need repeat CT chest in 6 weeks to assess for resolution. Wound care follow-up for sacral wound  # Heme: SCD's for DVT ppx, start enoxaparin 30 mg sq daily; mild leukocytosis however no fevers. monitor off abx  # Endo: DM2, continue insulin coverage scale + Lantus qhs. F/up endo recs. Continue levothyroxine  # Rheum: RA, on home prednisone 5 mg daily. Hold off on Enbrel at this time  # Dispo: full code, prognosis guarded, discussed with patient and daughter Marysol, d/c planning. 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 Found to have Pseudomonas aeruginosa in sputum culture and urine culture with ESBL E. coli s/p course of meropenem. Course complicated by antibiotic-associated diarrhea.    # Neuro: awake and alert, but with critical illness polyneuropathy. Outpatient f/up for hearing loss. Continue escitalopram and quetiapine. Fentanyl patch for pain along with diclofenac gel prn. Continue gabapentin. Oxycodone prn  # CV: HD stable. Hold off on diuresis today given significant diarrhea. Improved volume overload  # Pulm: continue lung protective ventilation. Pressure support trials as tolerates. Developed rapid shallow breathing with PS 15/5 today. Continue Duonebs  # GI: s/p G-J tube exchange by GI on 10/21. Keep G port for decompression of G-C fistula and J tube for meds and feeds. Tolerating feeds. Continued diarrhea. GI PCR and C diff negative. Continue banana flakes and lactobacillus. Started standing loperamide yesterday, c/t for another 24 hours and change to PRN tomorrow. Stool output improved to 700cc of liquid stool (from 1300)  # Renal: stable kidney function and lytes, strict I/O's  # ID: observe off antibiotics. Appreciate ID follow-up. CT A/P unremarkable. Chest CT consistent with multifocal pneumonia. Will need repeat CT chest in 6 weeks to assess for resolution. Wound care follow-up for sacral wound  # Heme: SCD's for DVT ppx, start enoxaparin 30 mg sq daily; mild leukocytosis however no fevers. monitor off abx  # Endo: DM2, continue insulin coverage scale + Lantus qAM--> ensure morning lispro is not held in the AM due to low FS as patient is getting tube feeds  # Rheum: RA, on home prednisone 5 mg daily. Hold off on Enbrel at this time  # Dispo: full code, prognosis guarded, discussed with patient and daughter Marysol, d/c planning. 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 Found to have Pseudomonas aeruginosa in sputum culture and urine culture with ESBL E. coli s/p course of meropenem. Course complicated by antibiotic-associated diarrhea.    # Neuro: awake and alert, but with critical illness polyneuropathy. Outpatient f/up for hearing loss. Continue escitalopram and quetiapine. Fentanyl patch for pain along with diclofenac gel prn. Continue gabapentin. Oxycodone prn  # CV: HD stable. Hold off on diuresis today given significant diarrhea. Improved volume overload  # Pulm: continue lung protective ventilation. Pressure support trials as tolerates. Developed rapid shallow breathing with PS 15/5 today. Continue Duonebs  # GI: s/p G-J tube exchange by GI on 10/21. Keep G port for decompression of G-C fistula and J tube for meds and feeds. Tolerating feeds. Continued diarrhea. GI PCR and C diff negative. Continue banana flakes and lactobacillus. Started standing loperamide yesterday, c/t for another 24 hours and change to PRN tomorrow. Stool output improved to 700cc of liquid stool (from 1300)  # Renal: stable kidney function and lytes, strict I/O's  # ID: observe off antibiotics. Appreciate ID follow-up. CT A/P unremarkable. Chest CT consistent with multifocal pneumonia. Will need repeat CT chest in 6 weeks to assess for resolution. Wound care follow-up for sacral wound. Febrile again today to 100.6 (despite being on tylenol for pain)- will reculture and followu p ID recs.   # Heme: SCD's for DVT ppx, start enoxaparin 30 mg sq daily; mild leukocytosis however no fevers. monitor off abx  # Endo: DM2, continue insulin coverage scale + Lantus qAM--> ensure morning lispro is not held in the AM due to low FS as patient is getting tube feeds  # Rheum: RA, on home prednisone 5 mg daily. Hold off on Enbrel at this time  # Dispo: full code, prognosis guarded, discussed with patient and daughter tong Gregg/mike planning.

## 2024-10-27 NOTE — PROGRESS NOTE ADULT - SUBJECTIVE AND OBJECTIVE BOX
Patient is a 74y old  Female who presents with a chief complaint of Patient admitted with Hypoxemia and episode of Bright red blood per rectum (26 Oct 2024 07:17)      Interval Events:    REVIEW OF SYSTEMS:  [ ] Positive  [ ] All other systems negative  [ ] Unable to assess ROS because ________    Vital Signs Last 24 Hrs  T(C): 36.4 (10-27-24 @ 04:52), Max: 36.8 (10-27-24 @ 00:07)  T(F): 97.5 (10-27-24 @ 04:52), Max: 98.2 (10-27-24 @ 00:07)  HR: 81 (10-27-24 @ 05:42) (68 - 84)  BP: 128/58 (10-27-24 @ 04:52) (114/50 - 161/64)  RR: 18 (10-27-24 @ 04:52) (17 - 18)  SpO2: 100% (10-27-24 @ 05:42) (97% - 100%)    PHYSICAL EXAM:  HEENT:   [ ]Tracheostomy:  [ ]Pupils equal  [ ]No oral lesions  [ ]Abnormal    SKIN  [ ]No Rash  [ ] Abnormal  [ ] pressure    CARDIAC  [ ]Regular  [ ]Abnormal    PULMONARY  [ ]Bilateral Clear Breath Sounds  [ ]Normal Excursion  [ ]Abnormal    GI  [ ]PEG      [ ] +BS		              [ ]Soft, nondistended, nontender	  [ ]Abnormal    MUSCULOSKELETAL                                   [ ]Bedbound                 [ ]Abnormal    [ ]Ambulatory/OOB to chair                           EXTREMITIES                                         [ ]Normal  [ ]Edema                           NEUROLOGIC  [ ] Normal, non focal  [ ] Focal findings:    PSYCHIATRIC  [ ]Alert and appropriate  [ ] Sedated	 [ ]Agitated    :  Mcbride: [ ] Yes, if yes: Date of Placement:                   [  ] No    LINES: Central Lines [ ] Yes, if yes: Date of Placement                                     [  ] No    HOSPITAL MEDICATIONS:  MEDICATIONS  (STANDING):  acetaminophen   Oral Liquid .. 650 milliGRAM(s) Enteral Tube every 8 hours  albuterol/ipratropium for Nebulization 3 milliLiter(s) Nebulizer every 6 hours  artificial  tears Solution 1 Drop(s) Both EYES every 6 hours  ascorbic acid 500 milliGRAM(s) Oral daily  Biotene Dry Mouth Oral Rinse 5 milliLiter(s) Swish and Spit every 6 hours  calcium carbonate 1250 mG  + Vitamin D (OsCal 500 + D) 1 Tablet(s) Oral <User Schedule>  chlorhexidine 0.12% Liquid 15 milliLiter(s) Oral Mucosa every 12 hours  chlorhexidine 4% Liquid 1 Application(s) Topical daily  dextrose 5%. 1000 milliLiter(s) (100 mL/Hr) IV Continuous <Continuous>  dextrose 5%. 1000 milliLiter(s) (50 mL/Hr) IV Continuous <Continuous>  dextrose 50% Injectable 25 Gram(s) IV Push once  dextrose 50% Injectable 12.5 Gram(s) IV Push once  diclofenac sodium 1% Gel 2 Gram(s) Topical every 6 hours  enoxaparin Injectable 30 milliGRAM(s) SubCutaneous <User Schedule>  escitalopram 10 milliGRAM(s) Oral <User Schedule>  fentaNYL   Patch  12 MICROgram(s)/Hr 1 Patch Transdermal every 72 hours  fentaNYL   Patch  25 MICROgram(s)/Hr 1 Patch Transdermal every 72 hours  gabapentin Solution 250 milliGRAM(s) Oral <User Schedule>  glucagon  Injectable 1 milliGRAM(s) IntraMuscular once  hemorrhoidal Ointment 1 Application(s) Rectal at bedtime  influenza  Vaccine (HIGH DOSE) 0.5 milliLiter(s) IntraMuscular once  insulin glargine Injectable (LANTUS) 18 Unit(s) SubCutaneous <User Schedule>  insulin lispro (ADMELOG) corrective regimen sliding scale   SubCutaneous every 6 hours  insulin lispro Injectable (ADMELOG) 22 Unit(s) SubCutaneous <User Schedule>  insulin lispro Injectable (ADMELOG) 13 Unit(s) SubCutaneous <User Schedule>  lactobacillus acidophilus 1 Tablet(s) Oral <User Schedule>  levothyroxine 125 MICROGram(s) Oral <User Schedule>  lidocaine   4% Patch 4 Patch Transdermal every 24 hours  loperamide Liquid 2 milliGRAM(s) Enteral Tube every 6 hours  melatonin Liquid 12 milliGRAM(s) Oral <User Schedule>  nystatin Powder 1 Application(s) Topical every 8 hours  pantoprazole  Injectable 40 milliGRAM(s) IV Push every 12 hours  prednisoLONE acetate 1% Suspension 1 Drop(s) Both EYES every 6 hours  predniSONE  Solution 5 milliGRAM(s) Enteral Tube <User Schedule>  QUEtiapine 12.5 milliGRAM(s) Oral <User Schedule>  sodium chloride 1 Gram(s) Oral every 12 hours  sodium chloride 0.65% Nasal 1 Spray(s) Both Nostrils every 6 hours  witch hazel Pads 1 Application(s) Topical every 12 hours    MEDICATIONS  (PRN):  aluminum hydroxide/magnesium hydroxide/simethicone Suspension 30 milliLiter(s) Enteral Tube every 4 hours PRN Dyspepsia      LABS:                        8.3    13.09 )-----------( 172      ( 26 Oct 2024 06:42 )             29.7     10-26    140  |  107  |  22  ----------------------------<  140[H]  4.5   |  25  |  <0.30[L]    Ca    8.4      26 Oct 2024 06:43  Phos  3.3     10-26  Mg     2.2     10-26        Urinalysis Basic - ( 26 Oct 2024 06:43 )    Color: x / Appearance: x / SG: x / pH: x  Gluc: 140 mg/dL / Ketone: x  / Bili: x / Urobili: x   Blood: x / Protein: x / Nitrite: x   Leuk Esterase: x / RBC: x / WBC x   Sq Epi: x / Non Sq Epi: x / Bacteria: x          CAPILLARY BLOOD GLUCOSE    MICROBIOLOGY:     RADIOLOGY:  [ ] Reviewed and interpreted by me    Mode: AC/ CMV (Assist Control/ Continuous Mandatory Ventilation)  RR (machine): 18  TV (machine): 350  FiO2: 30  PEEP: 5  ITime: 1  MAP: 10  PIP: 27   Patient is a 74y old  Female who presents with a chief complaint of Patient admitted with Hypoxemia and episode of Bright red blood per rectum (26 Oct 2024 07:17)    Interval Events:  No acute events overnight.     REVIEW OF SYSTEMS:  [ ] Positive  [X] All other systems negative  [ ] Unable to assess ROS because ________    Vital Signs Last 24 Hrs  T(C): 36.4 (10-27-24 @ 04:52), Max: 36.8 (10-27-24 @ 00:07)  T(F): 97.5 (10-27-24 @ 04:52), Max: 98.2 (10-27-24 @ 00:07)  HR: 81 (10-27-24 @ 05:42) (68 - 84)  BP: 128/58 (10-27-24 @ 04:52) (114/50 - 161/64)  RR: 18 (10-27-24 @ 04:52) (17 - 18)  SpO2: 100% (10-27-24 @ 05:42) (97% - 100%)    PHYSICAL EXAM:  HEENT:   [X]Tracheostomy:  #8 XLT shiley   [X]Pupils equal  [ ]No oral lesions  [ ]Abnormal    SKIN  [ ]No Rash  [ ] Abnormal  [X] pressure - sacral unstageable     CARDIAC  [X]Regular  [ ]Abnormal    PULMONARY  [ ]Bilateral Clear Breath Sounds  [ ]Normal Excursion  [X]Abnormal - mild coarse BS bilaterally     GI  [X]PEGJ      [X] +BS		              [X]Soft, nondistended, nontender	  [X]Abnormal - + rectal tube    MUSCULOSKELETAL                                   [X]Bedbound                 [ ]Abnormal    [ ]Ambulatory/OOB to chair                           EXTREMITIES                                         [ ]Normal  [X]Edema - generalized pitting edema                           NEUROLOGIC  [ ] Normal, non focal  [X] Focal findings: follows commands on all 4 extremities, attempts to communicate by mouthing words     PSYCHIATRIC  [X]Alert and appropriate  [ ] Sedated	 [ ]Agitated    :  Mcbride: [ ] Yes, if yes: Date of Placement:                   [X] No    LINES: Central Lines [ ] Yes, if yes: Date of Placement                                     [X] No    HOSPITAL MEDICATIONS:  MEDICATIONS  (STANDING):  acetaminophen   Oral Liquid .. 650 milliGRAM(s) Enteral Tube every 8 hours  albuterol/ipratropium for Nebulization 3 milliLiter(s) Nebulizer every 6 hours  artificial  tears Solution 1 Drop(s) Both EYES every 6 hours  ascorbic acid 500 milliGRAM(s) Oral daily  Biotene Dry Mouth Oral Rinse 5 milliLiter(s) Swish and Spit every 6 hours  calcium carbonate 1250 mG  + Vitamin D (OsCal 500 + D) 1 Tablet(s) Oral <User Schedule>  chlorhexidine 0.12% Liquid 15 milliLiter(s) Oral Mucosa every 12 hours  chlorhexidine 4% Liquid 1 Application(s) Topical daily  dextrose 5%. 1000 milliLiter(s) (100 mL/Hr) IV Continuous <Continuous>  dextrose 5%. 1000 milliLiter(s) (50 mL/Hr) IV Continuous <Continuous>  dextrose 50% Injectable 25 Gram(s) IV Push once  dextrose 50% Injectable 12.5 Gram(s) IV Push once  diclofenac sodium 1% Gel 2 Gram(s) Topical every 6 hours  enoxaparin Injectable 30 milliGRAM(s) SubCutaneous <User Schedule>  escitalopram 10 milliGRAM(s) Oral <User Schedule>  fentaNYL   Patch  12 MICROgram(s)/Hr 1 Patch Transdermal every 72 hours  fentaNYL   Patch  25 MICROgram(s)/Hr 1 Patch Transdermal every 72 hours  gabapentin Solution 250 milliGRAM(s) Oral <User Schedule>  glucagon  Injectable 1 milliGRAM(s) IntraMuscular once  hemorrhoidal Ointment 1 Application(s) Rectal at bedtime  influenza  Vaccine (HIGH DOSE) 0.5 milliLiter(s) IntraMuscular once  insulin glargine Injectable (LANTUS) 18 Unit(s) SubCutaneous <User Schedule>  insulin lispro (ADMELOG) corrective regimen sliding scale   SubCutaneous every 6 hours  insulin lispro Injectable (ADMELOG) 22 Unit(s) SubCutaneous <User Schedule>  insulin lispro Injectable (ADMELOG) 13 Unit(s) SubCutaneous <User Schedule>  lactobacillus acidophilus 1 Tablet(s) Oral <User Schedule>  levothyroxine 125 MICROGram(s) Oral <User Schedule>  lidocaine   4% Patch 4 Patch Transdermal every 24 hours  loperamide Liquid 2 milliGRAM(s) Enteral Tube every 6 hours  melatonin Liquid 12 milliGRAM(s) Oral <User Schedule>  nystatin Powder 1 Application(s) Topical every 8 hours  pantoprazole  Injectable 40 milliGRAM(s) IV Push every 12 hours  prednisoLONE acetate 1% Suspension 1 Drop(s) Both EYES every 6 hours  predniSONE  Solution 5 milliGRAM(s) Enteral Tube <User Schedule>  QUEtiapine 12.5 milliGRAM(s) Oral <User Schedule>  sodium chloride 1 Gram(s) Oral every 12 hours  sodium chloride 0.65% Nasal 1 Spray(s) Both Nostrils every 6 hours  witch hazel Pads 1 Application(s) Topical every 12 hours    MEDICATIONS  (PRN):  aluminum hydroxide/magnesium hydroxide/simethicone Suspension 30 milliLiter(s) Enteral Tube every 4 hours PRN Dyspepsia    LABS:                        8.3    13.09 )-----------( 172      ( 26 Oct 2024 06:42 )             29.7     10-26    140  |  107  |  22  ----------------------------<  140[H]  4.5   |  25  |  <0.30[L]    Ca    8.4      26 Oct 2024 06:43  Phos  3.3     10-26  Mg     2.2     10-26    Urinalysis Basic - ( 26 Oct 2024 06:43 )    Color: x / Appearance: x / SG: x / pH: x  Gluc: 140 mg/dL / Ketone: x  / Bili: x / Urobili: x   Blood: x / Protein: x / Nitrite: x   Leuk Esterase: x / RBC: x / WBC x   Sq Epi: x / Non Sq Epi: x / Bacteria: x    CAPILLARY BLOOD GLUCOSE    MICROBIOLOGY:     RADIOLOGY:  [ ] Reviewed and interpreted by me    Mode: AC/ CMV (Assist Control/ Continuous Mandatory Ventilation)  RR (machine): 18  TV (machine): 350  FiO2: 30  PEEP: 5  ITime: 1  MAP: 10  PIP: 27

## 2024-10-27 NOTE — PROGRESS NOTE ADULT - ASSESSMENT
75 yo F PMH T2DM on insulin, HTN, HLD, hypothyroidism, RA on Prednisone, Fibromyalgia, cerebral aneurysm s/p repair, chronic hypercapnic respiratory failure s/p trach (vent dependent) and chronic PEG 2022, recurrent C. diff infection (follows with Dr. 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  Pt also noted to have rectal bleeding this past week requiring transfusion 5 days ago in ED as per daughter. Daughter also reports brownish discharge from G-J tube. In ED, pt afebrile, fiO2 96-98% on 40% on ventilator.  Chest X-ray w/ opacification of right lung concerning for possible PNA.  Admitted to RCU for further management. Sputum cxs grew PSA; treated with course of Cefepime. Patient with decreased H+H received 1 unit of PRBCS on 10/10.  10/14 EGD w/ Dr. Rosales for PEGJ exchange and clippings placed for closure of fistula site.  Persistent fevers treated with meropenem and vanc (d/c'd 10/20).  CT A/P without infectious source.  Continue airway management with duonebs, chest PT and suction PRN.        10/23:  Feeds and insulin adjusted (uncontrolled BG levels) as per multidisciplinary team discussions involving nutrition, endocrine, nursing, management - final recs are ordered.  Increase in diarrhea - rectal tube placed, meropenem d/c'd.  Unable to sent cdiff specimen per hospital criteria and protocols - pt remains afebrile without leukocytosis.  Will continue to monitor and send specimen if needed.  Sent GI PCR.  Patient improving, discharge planning.  Marysol aware, updated and involved in POC.  Repleted electrolytes.  10/24: Hypoglycemic this morning.  Insulin regimen adjusted by  Endocrine and reviewed with nursing.  Remains with persistent diarrhea.  Cdiff assay was negative.  Will trial imodium.  10/25:  Morning FS is improved compared to yesterday.  Endocrine to make additional adjustments.  Free water increased due ot patient's significant losses via diarrhea. Imodium standing until diarrhea improves.   10/26:  Diarrhea output decreased compared to day prior, will continue imodium standing.  Simethicone changed to maalox TID via G-port to address patient's complaints of stomach burning.  75 yo F PMH T2DM on insulin, HTN, HLD, hypothyroidism, RA on Prednisone, Fibromyalgia, cerebral aneurysm s/p repair, chronic hypercapnic respiratory failure s/p trach (vent dependent) and chronic PEG 2022, recurrent C. diff infection (follows with Dr. 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  Pt also noted to have rectal bleeding this past week requiring transfusion 5 days ago in ED as per daughter. Daughter also reports brownish discharge from G-J tube. In ED, pt afebrile, fiO2 96-98% on 40% on ventilator.  Chest X-ray w/ opacification of right lung concerning for possible PNA.  Admitted to RCU for further management. Sputum cxs grew PSA; treated with course of Cefepime. Patient with decreased H+H received 1 unit of PRBCS on 10/10.  10/14 EGD w/ Dr. Rosales for PEGJ exchange and clippings placed for closure of fistula site.  Persistent fevers treated with meropenem and vanc (d/c'd 10/20).  CT A/P without infectious source.  Continue airway management with duonebs, chest PT and suction PRN.        10/27:  Pt slightly lethargic today but does not complain of anyhing.  Febrile to 100.6 orally.  With rising WBC, plan is for repeat infectious workup.

## 2024-10-27 NOTE — PROGRESS NOTE ADULT - NUTRITIONAL ASSESSMENT
Diet, NPO with Tube Feed:   Tube Feeding Modality: Jejunostomy  [employer] Peptide 1.5 (KFPEPT1.5RTH)  Total Volume for 24 Hours (mL): 960  Continuous  Until Goal Tube Feed Rate (mL per Hour): 80  Tube Feed Duration (in Hours): 12  Tube Feed Start Time: 06:00  Free Water Flush  Pump   Rate (mL per Hour): 50   Frequency: Every Hour  Free Water Flush Instructions:  50ml free water flushes Q1hr  Alfred(7 Gm Arginine/7 Gm Glut/1.2 Gm HMB     Qty per Day:  2  No Carb Prosource (1pkg = 15gms Protein)     Qty per Day:  1  Banatrol TF     Qty per Day:  1/2 packet per daily (10-26-24 @ 15:36) [Active]

## 2024-10-27 NOTE — PROGRESS NOTE ADULT - SUBJECTIVE AND OBJECTIVE BOX
Patient seen today for follow up inpatient Diabetes Mellitus management.    Chief Complaint: Type 2 Diabetes Mellitus     INTERVAL HX:  Patient seen in [facility] [id number]. Patient is alert and appropriate, trached on ventilator with aid at bedside. Patient remains on 12-14hr TFs- [employer] peptide 1.5 @80cc/hr, goal, tolerating well- TFs start at 0600 and end when all 960mL goes through (anytime between [id number]). FBG stable this am but on lower side at 87mg/dL. Per primary team, nutritional insulin held at 0600 when TFs started. No hypoglycemia. Noted BG pre-noon stable at 186mg/dL. Noted hyperglycemia at 1800 after TFs on to 326mg/dL. Patient hyperglycemic 2/2 enteral feedings. BG overnight stable. Blood glucose levels in the last 24hrs have been 87-326mg/dL.     Review of Systems:  General: As above.  Respiratory: Denies any SOB, HEREDIA, or cough.  Gastrointestinal: Denies any n/v/d or abdominal pain.   Endocrine: Denies any polyuria, polydipsia, polyphagia, visual changes, or numbness in feet.     Allergies  walnut (Unknown)  metronidazole (Rash)  Lyrica (Unknown)  penicillin (Unknown)  Pineapple (Unknown)  Tagamet (Unknown)  heparin (Unknown)  Pecans (Unknown)  Hazelnut (Unknown)  meropenem (Rash)      Intolerances  None.       MEDICATIONS  (STANDING):  acetaminophen   Oral Liquid .. 650 milliGRAM(s) Enteral Tube every 8 hours  albuterol/ipratropium for Nebulization 3 milliLiter(s) Nebulizer every 6 hours  aluminum hydroxide/magnesium hydroxide/simethicone Suspension 30 milliLiter(s) Enteral Tube every 4 hours PRN  artificial  tears Solution 1 Drop(s) Both EYES every 6 hours  ascorbic acid 500 milliGRAM(s) Oral daily  Biotene Dry Mouth Oral Rinse 5 milliLiter(s) Swish and Spit every 6 hours  calcium carbonate 1250 mG  + Vitamin D (OsCal 500 + D) 1 Tablet(s) Oral <User Schedule>  chlorhexidine 0.12% Liquid 15 milliLiter(s) Oral Mucosa every 12 hours  chlorhexidine 4% Liquid 1 Application(s) Topical daily  dextrose 5%. 1000 milliLiter(s) IV Continuous <Continuous>  dextrose 5%. 1000 milliLiter(s) IV Continuous <Continuous>  dextrose 50% Injectable 25 Gram(s) IV Push once  dextrose 50% Injectable 12.5 Gram(s) IV Push once  diclofenac sodium 1% Gel 2 Gram(s) Topical every 6 hours  enoxaparin Injectable 30 milliGRAM(s) SubCutaneous <User Schedule>  escitalopram 10 milliGRAM(s) Oral <User Schedule>  fentaNYL   Patch  12 MICROgram(s)/Hr 1 Patch Transdermal every 72 hours  fentaNYL   Patch  25 MICROgram(s)/Hr 1 Patch Transdermal every 72 hours  gabapentin Solution 250 milliGRAM(s) Oral <User Schedule>  glucagon  Injectable 1 milliGRAM(s) IntraMuscular once  hemorrhoidal Ointment 1 Application(s) Rectal at bedtime  influenza  Vaccine (HIGH DOSE) 0.5 milliLiter(s) IntraMuscular once  insulin lispro (ADMELOG) corrective regimen sliding scale   SubCutaneous every 6 hours  insulin lispro Injectable (ADMELOG) 13 Unit(s) SubCutaneous <User Schedule>  insulin lispro Injectable (ADMELOG) 25 Unit(s) SubCutaneous <User Schedule>  lactobacillus acidophilus 1 Tablet(s) Oral <User Schedule>  levothyroxine 125 MICROGram(s) Oral <User Schedule>  lidocaine   4% Patch 4 Patch Transdermal every 24 hours  loperamide Liquid 2 milliGRAM(s) Enteral Tube every 6 hours  melatonin Liquid 12 milliGRAM(s) Oral <User Schedule>  nystatin Powder 1 Application(s) Topical every 8 hours  pantoprazole  Injectable 40 milliGRAM(s) IV Push every 12 hours  prednisoLONE acetate 1% Suspension 1 Drop(s) Both EYES every 6 hours  predniSONE  Solution 5 milliGRAM(s) Enteral Tube <User Schedule>  QUEtiapine 12.5 milliGRAM(s) Oral <User Schedule>  sodium chloride 1 Gram(s) Oral every 12 hours  sodium chloride 0.65% Nasal 1 Spray(s) Both Nostrils every 6 hours  witch hazel Pads 1 Application(s) Topical every 12 hours      dextrose 50% Injectable 25 Gram(s) IV Push once  dextrose 50% Injectable 12.5 Gram(s) IV Push once  glucagon  Injectable 1 milliGRAM(s) IntraMuscular once  insulin lispro (ADMELOG) corrective regimen sliding scale   SubCutaneous every 6 hours  insulin lispro Injectable (ADMELOG) 25 Unit(s) SubCutaneous <User Schedule>  insulin lispro Injectable (ADMELOG) 13 Unit(s) SubCutaneous <User Schedule>  levothyroxine 125 MICROGram(s) Oral <User Schedule>  predniSONE  Solution 5 milliGRAM(s) Enteral Tube <User Schedule>      insulin lispro (ADMELOG) corrective regimen sliding scale   SubCutaneous every 6 hours  insulin lispro Injectable (ADMELOG) 25 Unit(s) SubCutaneous <User Schedule>  insulin lispro Injectable (ADMELOG) 13 Unit(s) SubCutaneous <User Schedule>      PHYSICAL EXAM:  VITALS:   T(C): 37.2 (10-27-24 @ 09:14), Max: 37.2 (10-27-24 @ 09:14)  HR: 80 (10-27-24 @ 09:14) (68 - 84)  BP: 126/52 (10-27-24 @ 09:14) (114/50 - 161/64)  RR: 18 (10-27-24 @ 09:14) (17 - 18)  SpO2: 99% (10-27-24 @ 09:14) (97% - 100%)    GENERAL: In no acute distress  Respiratory: Respirations unlabored trached on ventilator  Extremities: Warm and dry, +BLE edema  NEURO: Alert and follows commands, appropriate     LABS:  POCT Blood Glucose.: 186 mg/dL (10-27-24 @ 11:07)  POCT Blood Glucose.: 87 mg/dL (10-27-24 @ 05:52)  POCT Blood Glucose.: 130 mg/dL (10-26-24 @ 23:36)  POCT Blood Glucose.: 326 mg/dL (10-26-24 @ 17:26)  POCT Blood Glucose.: 233 mg/dL (10-26-24 @ 11:24)  POCT Blood Glucose.: 150 mg/dL (10-26-24 @ 06:54)  POCT Blood Glucose.: 214 mg/dL (10-25-24 @ 23:31)  POCT Blood Glucose.: 286 mg/dL (10-25-24 @ 18:46)  POCT Blood Glucose.: 170 mg/dL (10-25-24 @ 11:19)  POCT Blood Glucose.: 151 mg/dL (10-25-24 @ 07:02)  POCT Blood Glucose.: 77 mg/dL (10-25-24 @ 06:02)  POCT Blood Glucose.: 77 mg/dL (10-25-24 @ 05:49)  POCT Blood Glucose.: 309 mg/dL (10-24-24 @ 23:19)  POCT Blood Glucose.: 325 mg/dL (10-24-24 @ 17:47)  POCT Blood Glucose.: 305 mg/dL (10-24-24 @ 11:38)                          8.3    13.09 )-----------( 172      ( 26 Oct 2024 06:42 )             29.7     10-26    140  |  107  |  22  ----------------------------<  140[H]  4.5   |  25  |  <0.30[L]    Ca    8.4      26 Oct 2024 06:43  Phos  3.3     10-26  Mg     2.2     10-26        Urinalysis Basic - ( 26 Oct 2024 06:43 )    Color: x / Appearance: x / SG: x / pH: x  Gluc: 140 mg/dL / Ketone: x  / Bili: x / Urobili: x   Blood: x / Protein: x / Nitrite: x   Leuk Esterase: x / RBC: x / WBC x   Sq Epi: x / Non Sq Epi: x / Bacteria: x      A1C with Estimated Average Glucose Result: A1C with Estimated Average Glucose Result: 5.8 % (10-14-24 @ 05:08)  A1C with Estimated Average Glucose Result: 6.4 % (08-12-24 @ 07:32)

## 2024-10-28 NOTE — PROGRESS NOTE ADULT - SUBJECTIVE AND OBJECTIVE BOX
DIABETES FOLLOW UP NOTE: Saw pt earlier today    Chief Complaint: Endocrine consult requested for management of DM    INTERVAL HX: Pt sleeping today, daughter and staff answering questions. Per staff, pt tolerating TFs of [employer] at 80cc/hr starting at 6am and ending whenever volume of 960cc iscompleted > (14 to 15 hours/day) BG not at goal. Noted BG >300s at 12 noon today after 6am ADMELOG was discontinued due to BG in 180s without it yesterday. BG at goal at night without further hypo/hyperglycemia.  On Prednisone 5mg/day      Review of Systems:  General: As above  Unable    Allergies    walnut (Unknown)  metronidazole (Rash)  Lyrica (Unknown)  penicillin (Unknown)  Pineapple (Unknown)  Tagamet (Unknown)  heparin (Unknown)  Pecans (Unknown)  Hazelnut (Unknown)  meropenem (Rash)    Intolerances      MEDICATIONS    insulin glargine Injectable (LANTUS) 18 Unit(s) SubCutaneous <User Schedule>  insulin lispro (ADMELOG) corrective regimen sliding scale   SubCutaneous every 6 hours  insulin lispro Injectable (ADMELOG) 27 Unit(s) SubCutaneous <User Schedule>  insulin lispro Injectable (ADMELOG) 13 Unit(s) SubCutaneous <User Schedule>  levothyroxine 125 MICROGram(s) Oral <User Schedule>  prednisoLONE acetate 1% Suspension 1 Drop(s) Both EYES every 6 hours  predniSONE  Solution 5 milliGRAM(s) Enteral Tube <User Schedule>      PHYSICAL EXAM:  VITALS: T(C): 37 (10-28-24 @ 12:25)  T(F): 98.6 (10-28-24 @ 12:25), Max: 99.8 (10-27-24 @ 22:46)  HR: 90 (10-28-24 @ 15:34) (80 - 91)  BP: 143/60 (10-28-24 @ 12:25) (108/45 - 143/60)  RR:  (18 - 22)  SpO2:  (98% - 100%)  Wt(kg): --  GENERAL:  Female laying in bed in NAD. Daughter and staff at bedside  HEENT: Trach in place  Abdomen: Soft, nontender, non distended, PEG in place with TF running at 80cc/hr at time of visit  Extremities: Warm, no edema in all 4 exts  NEURO: Sleeping today and not waking up      LABS:  POCT Blood Glucose.: 373 mg/dL (10-28-24 @ 11:16)  POCT Blood Glucose.: 144 mg/dL (10-28-24 @ 05:37)  POCT Blood Glucose.: 120 mg/dL (10-27-24 @ 23:36)  POCT Blood Glucose.: 227 mg/dL (10-27-24 @ 18:18)  POCT Blood Glucose.: 186 mg/dL (10-27-24 @ 11:07)  POCT Blood Glucose.: 87 mg/dL (10-27-24 @ 05:52)  POCT Blood Glucose.: 130 mg/dL (10-26-24 @ 23:36)  POCT Blood Glucose.: 326 mg/dL (10-26-24 @ 17:26)  POCT Blood Glucose.: 233 mg/dL (10-26-24 @ 11:24)  POCT Blood Glucose.: 150 mg/dL (10-26-24 @ 06:54)  POCT Blood Glucose.: 214 mg/dL (10-25-24 @ 23:31)  POCT Blood Glucose.: 286 mg/dL (10-25-24 @ 18:46)                            8.2    11.46 )-----------( 146      ( 28 Oct 2024 13:05 )             28.9       10-28    129[L]  |  97  |  25[H]  ----------------------------<  402[H]  5.3   |  24  |  <0.30[L]    eGFR: 113    Ca    8.0[L]      10-28  Mg     2.1     10-28  Phos  3.3     10-28    TPro  6.2  /  Alb  2.3[L]  /  TBili  0.4  /  DBili  x   /  AST  40  /  ALT  22  /  AlkPhos  139[H]  10-28      Thyroid Function Tests:  10-16 @ 06:50 TSH 1.74 FreeT4 1.2 T3 -- Anti TPO -- Anti Thyroglobulin Ab -- TSI --      A1C with Estimated Average Glucose Result: 5.8 % (10-14-24 @ 05:08)  A1C with Estimated Average Glucose Result: 6.4 % (08-12-24 @ 07:32)      Estimated Average Glucose: 120 mg/dL (10-14-24 @ 05:08)  Estimated Average Glucose: 137 mg/dL (08-12-24 @ 07:32)

## 2024-10-28 NOTE — PROGRESS NOTE ADULT - ASSESSMENT
73 yo F PMH T2DM on insulin, HTN, HLD, hypothyroidism, RA on Prednisone, Fibromyalgia, cerebral aneurysm s/p repair, chronic hypercapnic respiratory failure s/p trach (vent dependent) and chronic PEG 2022, recurrent C. diff infection (follows with Dr. 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  Pt also noted to have rectal bleeding this past week requiring transfusion 5 days ago in ED as per daughter. Daughter also reports brownish discharge from G-J tube. In ED, pt afebrile, fiO2 96-98% on 40% on ventilator.  Chest X-ray w/ opacification of right lung concerning for possible PNA.  Admitted to RCU for further management. Sputum cxs grew PSA; treated with course of Cefepime. Patient with decreased H+H received 1 unit of PRBCS on 10/10.  10/14 EGD w/ Dr. Rosales for PEGJ exchange and clippings placed for closure of fistula site.  Persistent fevers treated with meropenem and vanc (d/c'd 10/20).  CT A/P without infectious source.  Continue airway management with duonebs, chest PT and suction PRN.        10/27:  Pt slightly lethargic today but does not complain of anyhing.  Febrile to 100.6 orally.  With rising WBC, plan is for repeat infectious workup.   75 yo F PMH T2DM on insulin, HTN, HLD, hypothyroidism, RA on Prednisone, Fibromyalgia, cerebral aneurysm s/p repair, chronic hypercapnic respiratory failure s/p trach (vent dependent) and chronic PEG 2022, recurrent C. diff infection (follows with Dr. 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  Pt also noted to have rectal bleeding this past week requiring transfusion 5 days ago in ED as per daughter. Daughter also reports brownish discharge from G-J tube. In ED, pt afebrile, fiO2 96-98% on 40% on ventilator.  Chest X-ray w/ opacification of right lung concerning for possible PNA.  Admitted to RCU for further management. Sputum cxs grew PSA; treated with course of Cefepime. Patient with decreased H+H received 1 unit of PRBCS on 10/10.  10/14 EGD w/ Dr. Rosales for PEGJ exchange and clippings placed for closure of fistula site.  Persistent fevers treated with meropenem and vanc (d/c'd 10/20).  CT A/P without infectious source.  Continue airway management with duonebs, chest PT and suction PRN.         10/28:  WBC improving off antibiotics.  Hgb stable.  Pt appears nontoxic and at baseline mental status.  Discussed with Dr. Newman, will continue to observe off antibiotics.  Diarrhea improving, will reassess removal of rectal tube tomorrow.

## 2024-10-28 NOTE — PROGRESS NOTE ADULT - SUBJECTIVE AND OBJECTIVE BOX
Patient is a 74y old  Female who presents with a chief complaint of Patient admitted with Hypoxemia and episode of Bright red blood per rectum (27 Oct 2024 11:12)      Interval Events:    REVIEW OF SYSTEMS:  [ ] Positive  [ ] All other systems negative  [ ] Unable to assess ROS because ________    Vital Signs Last 24 Hrs  T(C): 37.1 (10-28-24 @ 05:32), Max: 37.7 (10-27-24 @ 22:46)  T(F): 98.8 (10-28-24 @ 05:32), Max: 99.8 (10-27-24 @ 22:46)  HR: 80 (10-28-24 @ 05:49) (78 - 91)  BP: 108/45 (10-28-24 @ 05:32) (108/45 - 128/59)  RR: 18 (10-28-24 @ 05:32) (18 - 20)  SpO2: 100% (10-28-24 @ 05:49) (99% - 100%)    PHYSICAL EXAM:  HEENT:   [ ]Tracheostomy:  [ ]Pupils equal  [ ]No oral lesions  [ ]Abnormal    SKIN  [ ]No Rash  [ ] Abnormal  [ ] pressure    CARDIAC  [ ]Regular  [ ]Abnormal    PULMONARY  [ ]Bilateral Clear Breath Sounds  [ ]Normal Excursion  [ ]Abnormal    GI  [ ]PEG      [ ] +BS		              [ ]Soft, nondistended, nontender	  [ ]Abnormal    MUSCULOSKELETAL                                   [ ]Bedbound                 [ ]Abnormal    [ ]Ambulatory/OOB to chair                           EXTREMITIES                                         [ ]Normal  [ ]Edema                           NEUROLOGIC  [ ] Normal, non focal  [ ] Focal findings:    PSYCHIATRIC  [ ]Alert and appropriate  [ ] Sedated	 [ ]Agitated    :  Mcbride: [ ] Yes, if yes: Date of Placement:                   [  ] No    LINES: Central Lines [ ] Yes, if yes: Date of Placement                                     [  ] No    HOSPITAL MEDICATIONS:  MEDICATIONS  (STANDING):  acetaminophen   Oral Liquid .. 650 milliGRAM(s) Enteral Tube every 8 hours  albuterol/ipratropium for Nebulization 3 milliLiter(s) Nebulizer every 6 hours  artificial  tears Solution 1 Drop(s) Both EYES every 6 hours  ascorbic acid 500 milliGRAM(s) Oral daily  Biotene Dry Mouth Oral Rinse 5 milliLiter(s) Swish and Spit every 6 hours  calcium carbonate 1250 mG  + Vitamin D (OsCal 500 + D) 1 Tablet(s) Oral <User Schedule>  chlorhexidine 0.12% Liquid 15 milliLiter(s) Oral Mucosa every 12 hours  chlorhexidine 4% Liquid 1 Application(s) Topical daily  dextrose 5%. 1000 milliLiter(s) (100 mL/Hr) IV Continuous <Continuous>  dextrose 5%. 1000 milliLiter(s) (50 mL/Hr) IV Continuous <Continuous>  dextrose 50% Injectable 25 Gram(s) IV Push once  dextrose 50% Injectable 12.5 Gram(s) IV Push once  diclofenac sodium 1% Gel 2 Gram(s) Topical every 6 hours  enoxaparin Injectable 30 milliGRAM(s) SubCutaneous <User Schedule>  escitalopram 10 milliGRAM(s) Oral <User Schedule>  fentaNYL   Patch  12 MICROgram(s)/Hr 1 Patch Transdermal every 72 hours  fentaNYL   Patch  25 MICROgram(s)/Hr 1 Patch Transdermal every 72 hours  gabapentin Solution 250 milliGRAM(s) Oral <User Schedule>  glucagon  Injectable 1 milliGRAM(s) IntraMuscular once  hemorrhoidal Ointment 1 Application(s) Rectal at bedtime  influenza  Vaccine (HIGH DOSE) 0.5 milliLiter(s) IntraMuscular once  insulin glargine Injectable (LANTUS) 18 Unit(s) SubCutaneous <User Schedule>  insulin lispro (ADMELOG) corrective regimen sliding scale   SubCutaneous every 6 hours  insulin lispro Injectable (ADMELOG) 25 Unit(s) SubCutaneous <User Schedule>  insulin lispro Injectable (ADMELOG) 13 Unit(s) SubCutaneous <User Schedule>  lactobacillus acidophilus 1 Tablet(s) Oral <User Schedule>  levothyroxine 125 MICROGram(s) Oral <User Schedule>  lidocaine   4% Patch 4 Patch Transdermal every 24 hours  loperamide Liquid 2 milliGRAM(s) Enteral Tube every 6 hours  melatonin 12 milliGRAM(s) Oral <User Schedule>  nystatin Powder 1 Application(s) Topical every 8 hours  pantoprazole  Injectable 40 milliGRAM(s) IV Push every 12 hours  prednisoLONE acetate 1% Suspension 1 Drop(s) Both EYES every 6 hours  predniSONE  Solution 5 milliGRAM(s) Enteral Tube <User Schedule>  QUEtiapine 12.5 milliGRAM(s) Oral <User Schedule>  sodium chloride 1 Gram(s) Oral every 12 hours  sodium chloride 0.65% Nasal 1 Spray(s) Both Nostrils every 6 hours  witch hazel Pads 1 Application(s) Topical every 12 hours    MEDICATIONS  (PRN):  aluminum hydroxide/magnesium hydroxide/simethicone Suspension 30 milliLiter(s) Enteral Tube every 4 hours PRN Dyspepsia      LABS:            Urinalysis Basic - ( 27 Oct 2024 19:49 )    Color: Yellow / Appearance: Clear / S.014 / pH: x  Gluc: x / Ketone: Negative mg/dL  / Bili: Negative / Urobili: 0.2 mg/dL   Blood: x / Protein: Trace mg/dL / Nitrite: Negative   Leuk Esterase: Negative / RBC: x / WBC x   Sq Epi: x / Non Sq Epi: x / Bacteria: x          CAPILLARY BLOOD GLUCOSE    MICROBIOLOGY:     RADIOLOGY:  [ ] Reviewed and interpreted by me    Mode: AC/ CMV (Assist Control/ Continuous Mandatory Ventilation)  RR (machine): 18  TV (machine): 350  FiO2: 30  PEEP: 5  ITime: 1  MAP: 9  PIP: 25   Patient is a 74y old  Female who presents with a chief complaint of Patient admitted with Hypoxemia and episode of Bright red blood per rectum (27 Oct 2024 11:12)    Interval Events:  No acute events overnight.     REVIEW OF SYSTEMS:  [ ] Positive  [X] All other systems negative  [ ] Unable to assess ROS because ________    Vital Signs Last 24 Hrs  T(C): 37.1 (10-28-24 @ 05:32), Max: 37.7 (10-27-24 @ 22:46)  T(F): 98.8 (10-28-24 @ 05:32), Max: 99.8 (10-27-24 @ 22:46)  HR: 80 (10-28-24 @ 05:49) (78 - 91)  BP: 108/45 (10-28-24 @ 05:32) (108/45 - 128/59)  RR: 18 (10-28-24 @ 05:32) (18 - 20)  SpO2: 100% (10-28-24 @ 05:49) (99% - 100%)    PHYSICAL EXAM:  HEENT:   [X]Tracheostomy:  #8 XLT shiley   [X]Pupils equal  [ ]No oral lesions  [ ]Abnormal    SKIN  [ ]No Rash  [ ] Abnormal  [X] pressure - sacral unstageable     CARDIAC  [X]Regular  [ ]Abnormal    PULMONARY  [ ]Bilateral Clear Breath Sounds  [ ]Normal Excursion  [X]Abnormal - mild coarse BS bilaterally     GI  [X]PEGJ      [X] +BS		              [X]Soft, nondistended, nontender	  [X]Abnormal - + rectal tube    MUSCULOSKELETAL                                   [X]Bedbound                 [ ]Abnormal    [ ]Ambulatory/OOB to chair                           EXTREMITIES                                         [ ]Normal  [X]Edema - generalized pitting edema                           NEUROLOGIC  [ ] Normal, non focal  [X] Focal findings: follows commands on all 4 extremities, attempts to communicate by mouthing words     PSYCHIATRIC  [X]Alert and appropriate  [ ] Sedated	 [ ]Agitated    :  Mcbride: [ ] Yes, if yes: Date of Placement:                   [X] No    LINES: Central Lines [ ] Yes, if yes: Date of Placement                                     [X] No    HOSPITAL MEDICATIONS:  MEDICATIONS  (STANDING):  acetaminophen   Oral Liquid .. 650 milliGRAM(s) Enteral Tube every 8 hours  albuterol/ipratropium for Nebulization 3 milliLiter(s) Nebulizer every 6 hours  artificial  tears Solution 1 Drop(s) Both EYES every 6 hours  ascorbic acid 500 milliGRAM(s) Oral daily  Biotene Dry Mouth Oral Rinse 5 milliLiter(s) Swish and Spit every 6 hours  calcium carbonate 1250 mG  + Vitamin D (OsCal 500 + D) 1 Tablet(s) Oral <User Schedule>  chlorhexidine 0.12% Liquid 15 milliLiter(s) Oral Mucosa every 12 hours  chlorhexidine 4% Liquid 1 Application(s) Topical daily  dextrose 5%. 1000 milliLiter(s) (100 mL/Hr) IV Continuous <Continuous>  dextrose 5%. 1000 milliLiter(s) (50 mL/Hr) IV Continuous <Continuous>  dextrose 50% Injectable 25 Gram(s) IV Push once  dextrose 50% Injectable 12.5 Gram(s) IV Push once  diclofenac sodium 1% Gel 2 Gram(s) Topical every 6 hours  enoxaparin Injectable 30 milliGRAM(s) SubCutaneous <User Schedule>  escitalopram 10 milliGRAM(s) Oral <User Schedule>  fentaNYL   Patch  12 MICROgram(s)/Hr 1 Patch Transdermal every 72 hours  fentaNYL   Patch  25 MICROgram(s)/Hr 1 Patch Transdermal every 72 hours  gabapentin Solution 250 milliGRAM(s) Oral <User Schedule>  glucagon  Injectable 1 milliGRAM(s) IntraMuscular once  hemorrhoidal Ointment 1 Application(s) Rectal at bedtime  influenza  Vaccine (HIGH DOSE) 0.5 milliLiter(s) IntraMuscular once  insulin glargine Injectable (LANTUS) 18 Unit(s) SubCutaneous <User Schedule>  insulin lispro (ADMELOG) corrective regimen sliding scale   SubCutaneous every 6 hours  insulin lispro Injectable (ADMELOG) 25 Unit(s) SubCutaneous <User Schedule>  insulin lispro Injectable (ADMELOG) 13 Unit(s) SubCutaneous <User Schedule>  lactobacillus acidophilus 1 Tablet(s) Oral <User Schedule>  levothyroxine 125 MICROGram(s) Oral <User Schedule>  lidocaine   4% Patch 4 Patch Transdermal every 24 hours  loperamide Liquid 2 milliGRAM(s) Enteral Tube every 6 hours  melatonin 12 milliGRAM(s) Oral <User Schedule>  nystatin Powder 1 Application(s) Topical every 8 hours  pantoprazole  Injectable 40 milliGRAM(s) IV Push every 12 hours  prednisoLONE acetate 1% Suspension 1 Drop(s) Both EYES every 6 hours  predniSONE  Solution 5 milliGRAM(s) Enteral Tube <User Schedule>  QUEtiapine 12.5 milliGRAM(s) Oral <User Schedule>  sodium chloride 1 Gram(s) Oral every 12 hours  sodium chloride 0.65% Nasal 1 Spray(s) Both Nostrils every 6 hours  witch hazel Pads 1 Application(s) Topical every 12 hours    MEDICATIONS  (PRN):  aluminum hydroxide/magnesium hydroxide/simethicone Suspension 30 milliLiter(s) Enteral Tube every 4 hours PRN Dyspepsia    LABS:    Urinalysis Basic - ( 27 Oct 2024 19:49 )    Color: Yellow / Appearance: Clear / S.014 / pH: x  Gluc: x / Ketone: Negative mg/dL  / Bili: Negative / Urobili: 0.2 mg/dL   Blood: x / Protein: Trace mg/dL / Nitrite: Negative   Leuk Esterase: Negative / RBC: x / WBC x   Sq Epi: x / Non Sq Epi: x / Bacteria: x    CAPILLARY BLOOD GLUCOSE    MICROBIOLOGY:     RADIOLOGY:  [ ] Reviewed and interpreted by me    Mode: AC/ CMV (Assist Control/ Continuous Mandatory Ventilation)  RR (machine): 18  TV (machine): 350  FiO2: 30  PEEP: 5  ITime: 1  MAP: 9  PIP: 25

## 2024-10-28 NOTE — PROGRESS NOTE ADULT - SUBJECTIVE AND OBJECTIVE BOX
Follow Up:  resp failure    Interval History/ROS:  low grade temp last night  - was sleepy when seen this am,   at bedside says she has occasional abdominal discomfort    Allergies  walnut (Unknown)  metronidazole (Rash)  Lyrica (Unknown)  penicillin (Unknown)  Pineapple (Unknown)  Tagamet (Unknown)  heparin (Unknown)  Pecans (Unknown)  Hazelnut (Unknown)  meropenem (Rash)        ANTIMICROBIALS:      OTHER MEDS:  MEDICATIONS  (STANDING):  acetaminophen   Oral Liquid .. 650 every 8 hours  albuterol/ipratropium for Nebulization 3 every 6 hours  aluminum hydroxide/magnesium hydroxide/simethicone Suspension 30 every 4 hours PRN  dextrose 50% Injectable 25 once  dextrose 50% Injectable 12.5 once  escitalopram 10 <User Schedule>  gabapentin Solution 250 <User Schedule>  glucagon  Injectable 1 once  influenza  Vaccine (HIGH DOSE) 0.5 once  insulin glargine Injectable (LANTUS) 18 <User Schedule>  insulin lispro (ADMELOG) corrective regimen sliding scale  every 6 hours  insulin lispro Injectable (ADMELOG) 13 <User Schedule>  insulin lispro Injectable (ADMELOG) 27 <User Schedule>  levothyroxine 125 <User Schedule>  melatonin 12 <User Schedule>  pantoprazole  Injectable 40 every 12 hours  predniSONE  Solution 5 <User Schedule>  QUEtiapine 12.5 <User Schedule>      Vital Signs Last 24 Hrs  T(C): 37 (28 Oct 2024 12:25), Max: 37.7 (27 Oct 2024 22:46)  T(F): 98.6 (28 Oct 2024 12:25), Max: 99.8 (27 Oct 2024 22:46)  HR: 90 (28 Oct 2024 15:34) (80 - 91)  BP: 143/60 (28 Oct 2024 12:25) (108/45 - 143/60)  BP(mean): --  RR: 22 (28 Oct 2024 15:34) (18 - 22)  SpO2: 99% (28 Oct 2024 15:34) (98% - 100%)    Parameters below as of 28 Oct 2024 15:34  Patient On (Oxygen Delivery Method): ventilator        PHYSICAL EXAM:  General: chronically ill appearing,   turned on left side NAD, Non-toxic  Neurology: slwwpy rouses, to touch  Respiratory: Clear to auscultation bilaterally  CV: RRR, S1S2, no murmurs, rubs or gallops  Abdominal: Soft, Non-tender, non-distended, normal bowel sounds  Extremities: trace  edema,  Line Sites: Clear  Skin: No rash                          8.2    11.46 )-----------( 146      ( 28 Oct 2024 13:05 )             28.9   WBC Count: 11.46 (10-28 @ 13:05)  WBC Count: 12.59 (10-28 @ 10:20)  WBC Count: 13.09 (10-26 @ 06:42)  WBC Count: 7.29 (10-24 @ 07:03)      10-28    129[L]  |  97  |  25[H]  ----------------------------<  402[H]  5.3   |  24  |  <0.30[L]    Ca    8.0[L]      28 Oct 2024 13:05  Phos  3.3     10-28  Mg     2.1     10-28    TPro  6.2  /  Alb  2.3[L]  /  TBili  0.4  /  DBili  x   /  AST  40  /  ALT  22  /  AlkPhos  139[H]  10-28      Urinalysis Basic - ( 28 Oct 2024 13:05 )    Color: x / Appearance: x / SG: x / pH: x  Gluc: 402 mg/dL / Ketone: x  / Bili: x / Urobili: x   Blood: x / Protein: x / Nitrite: x   Leuk Esterase: x / RBC: x / WBC x   Sq Epi: x / Non Sq Epi: x / Bacteria: x        MICROBIOLOGY:  Trach Asp Tracheal Aspirate  10-27-24   Moderate Pseudomonas aeruginosa  Commensal vinay consistent with body site  --    Rare polymorphonuclear leukocytes per low power field  Rare Squamous epithelial cells per low power field  Moderate Gram Negative Rods per oil power field  Moderate Gram Positive Rods per oil power field      .Blood BLOOD  10-17-24   No growth at 5 days  --  --      .Blood BLOOD  10-17-24   No growth at 5 days  --  --      .Blood BLOOD  10-15-24   No growth at 5 days  --  --      Trach Asp Tracheal Aspirate  10-15-24   Mixed gram negative rods including  Numerous Pseudomonas aeruginosa  Commensal vinay consistent with body site  --  Pseudomonas aeruginosa      .Blood BLOOD  10-15-24   No growth at 5 days  --  --      Trach Asp Tracheal Aspirate  10-08-24   Few Pseudomonas aeruginosa  Commensal vinay consistent with body site  --  Pseudomonas aeruginosa      Clean Catch Clean Catch (Midstream)  10-07-24   10,000 - 49,000 CFU/mL Escherichia coli ESBL  --  Escherichia coli ESBL      .Blood BLOOD  10-07-24   No growth at 5 days  --  --      .Blood BLOOD  10-07-24   No growth at 5 days  --  --      Clean Catch Clean Catch (Midstream)  10-03-24   Culture grew 3 or more types of organisms which indicate  collection contamination; consider recollection only if clinically  indicated.  --  --    Clostridium difficile GDH Toxins A&amp;B, EIA:   Negative (10-24-24 @ 16:39)  Clostridium difficile GDH Interpretation: Negative for toxigenic C. Difficile.  This specimen is negative for C.  Difficile glutamate dehydrogenase (GDH) antigen and negative for C.  Difficile Toxins A & B, by EIA.  (10-24-24 @ 16:39)      RADIOLOGY:  < from: Xray Chest 1 View- PORTABLE-Urgent (Xray Chest 1 View- PORTABLE-Urgent .) (10.27.24 @ 18:36) >  FINDINGS:    Support devices: Tracheostomy tube tip above the nick.    Cardiac/mediastinum/hilum: Heart size is within normal limits.    Lung parenchyma/Pleura: Bilateral patchy opacities, slightly increased on   the right when compared with 10/15/2024. There are trace bilateral   pleural effusions. There is no pneumothorax.    Skeleton/soft tissues: No acute osseous abnormalities.    IMPRESSION:    Bilateral patchy opacities are slightly increased from 10/15/2024.    < end of copied text >      Zion Newman MD; Division of Infectious Disease; Pager: [phone]; nights and weekends: [phone]

## 2024-10-28 NOTE — PROGRESS NOTE ADULT - NUTRITIONAL ASSESSMENT
Diet, NPO with Tube Feed:   Tube Feeding Modality: Jejunostomy  Casie Uzabase Peptide 1.5 (KFPEPT1.5RTH)  Total Volume for 24 Hours (mL): 960  Continuous  Until Goal Tube Feed Rate (mL per Hour): 80  Tube Feed Duration (in Hours): 12  Tube Feed Start Time: 06:00  Free Water Flush  Pump   Rate (mL per Hour): 50   Frequency: Every Hour  Free Water Flush Instructions:  50ml free water flushes Q1hr  Alfred(7 Gm Arginine/7 Gm Glut/1.2 Gm HMB     Qty per Day:  2  No Carb Prosource (1pkg = 15gms Protein)     Qty per Day:  1  Banatrol TF     Qty per Day:  1/2 packet per daily (10-26-24 @ 15:36) [Active]      Please see RD assessment and/or follow up.  Managed by primary team as well

## 2024-10-28 NOTE — PROGRESS NOTE ADULT - NS ATTEND AMEND GEN_ALL_CORE FT
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 Found to have Pseudomonas aeruginosa in sputum culture and urine culture with ESBL E. coli s/p course of meropenem. Course complicated by antibiotic-associated diarrhea.    Was having high stool output, now improved. But with increased gastric output.   Not pressure supporting well  Being monitored off abx.   Low h/h pending repeat.     # Neuro: awake and alert, but with critical illness polyneuropathy. Outpatient f/up for hearing loss. Continue escitalopram and quetiapine. Fentanyl patch for pain along with diclofenac gel prn. Continue gabapentin. Oxycodone prn  # CV: HD stable. Hold off on diuresis today given significant diarrhea. Improved volume overload. possibly due to low albumin state.  # Pulm: continue lung protective ventilation. Pressure support trials as tolerates. Developed rapid shallow breathing with PS 15/5 today. Continue Duonebs.   # GI: s/p G-J tube exchange by GI on 10/21. Keep G port for decompression of G-C fistula and J tube for meds and feeds. Tolerating feeds. Continued diarrhea. GI PCR and C diff negative. Continue banana flakes and lactobacillus. Change to loperamide PRN. . Stool output improved. Discussion about need for rectal tube as outpatient. Check abn xray given high gastric output.   # Renal: stable kidney function and lytes, strict I/O's  # ID: observe off antibiotics. Appreciate ID follow-up. CT A/P unremarkable. Will need repeat CT chest in 6 weeks to assess for resolution. Wound care follow-up for sacral wound. F/U with further ID recs.   # Heme: SCD's for DVT ppx, Hold lovenox for low H/H. Repeat CBC pending no signs of bleeding. D  # Endo: DM2, continue insulin coverage scale + Lantus qAM--> ensure morning lispro is not held in the AM due to low FS as patient is getting tube feeds  # Rheum: RA, on home prednisone 5 mg daily. Hold off on Enbrel at this time  # Dispo: full code, prognosis guarded, discussed with patient and daughter Marysol d/c planning.

## 2024-10-28 NOTE — PROGRESS NOTE ADULT - ASSESSMENT
73 yo woman PMH T2DM on insulin, HTN, HLD, hypothyroidism, RA on Prednisone, Fibromyalgia, cerebral aneurysm s/p repair, chronic hypercapnic respiratory failure s/p trach (vent dependent) and chronic PEG 2022, recurrent C. diff infection , persistent GJ tube leaks now s/p GC fistula repair 8/12  admitted 10/7/24 with resp distress and found to have RML opacity     Antibiotics  Ceftriaxone 10/7  Azithro 10/7  Cefepime 10/7--> 10/12  meropenem 10/15 --> 10/23  IV Vanco 10/17 --> 10/21  Vanco via NGT 10/24      10/10 melena, anemia, blood tx  10/14 EGD for GJ exchange and piror PEG site closure  One benign-appearing, intrinsic moderate stenosis was found in the upper third of the        esophagus. The stenosis was traversed.       The cardia and gastric fundus were normal.       A gastric tube with tip in the jejunum was found in the gastric body. The PEG required        removal because it was leaking. The J tube extension (12F) was removed first. An externally        removable 24 Fr EndoVive Safety gastrostomy tube was lubricated. The guide wire was passed        through the existing G-tube port and snared endoscopically. The endoscope and snare were then        removed, pulling the wire out through the mouth. The g-tube was tied to the guidewire, pulled        through the mouth into the stomach and then pulled out from the stomach through the skin. The        bumper was attached to the gastrostomy tube. The feeding tube was then cut to an appropriate        length. The final position of the gastrostomy tube was confirmed by relook endoscopy, and        skin marking noted to be 3 cm at the external bumper. The final tension and compression of        the abdominal wall by the PEG tube and external bumper were checked and revealed that the        bumper was moderately tight and mildly deforming the skin. The J tube extension (12 F        EndoVive Jejunal feeding tube) was advanced into the stomach. The tip of the J tube had a        loop thread that was captured with a Resolution clip. The thread and the J tube extension        were advanced to the proximal jejunum, and the endoclip along with the tip of the J tube was        attached to the wall securely. The J tube extension was capped, and the tube site was cleaned        and dressed.       A small fistula was found on the anterior wall of the gastric body related to prior G tube        site. Coagulation of tissue near the fistula using argon plasma at 1.2 liters/minute and 35        peraza was successful. To closure the gastrocutaneous fistula, four hemostatic clips were        successfully placed (MR conditional, two 16 mm DuraClip and 2 Mantis clips.       The examined duodenum was normal.    10/14, 10/15 Fever, transient hypoxia post procedure  10/15 ProCalcitonin = 8.48 suggestive of bacterial process; BCx x2 NGTD; Trach: Pseudomonas   - though benign mg stain  10;16 Leukocytosis WBC = 14.26  10/17 fever, hypotension, abd distension, no diarrhea noted this am WBC = 15.02; Rising Procalcitonin = 38.49; Obstructed J tube   10/18  lost IV access re-gained  - CT scan late this afternoon  WBC = 8.68; Rising Procalcitonin >100  10/18 imaging suggests multifocal pneumonia  10/21 improved chest exam, Procalcitonin level considerably decreased, decreased FiO2  - afebrile since 10/18  10/21 UGI endoscopy done  Findings:       The esophagus was normal. A fistula was found on the anterior wall of the gastric body.       There was evidence of a gastrostomy present in the gastric body. The patient was placed in        the supine position. The endoscope was advanced to the jejunum and the prior placed J tube        with loop attached to the wall and the loop thread was cut by endoclip. The J tube and the G        tube were removed. The gastrostomy fistula was utilized and an Avanos 22 F        Gastrostomy/Jejunal tube was advanced. The jejunal tip was advanced through the pylorus and        into the duodenum. The endoscope was then advanced to the duodenum and the loop thread at the        tip of the J tube was captured and advanced to the proximal jejunum. The loop thread was        clipped to wall by a [employer] Resolution clip. The J tube flushed easily and the G        tube decompress the abdomen easily. The internal balloon was insufflated with 10 cc of water        to hold the GJ tube in place. The outside marking was at 3.  10/23  no distress, liquid stools noted  - Meropenem discontinued  GI PCR NEG  10/24  persistent diarrhea  Cdiff NEG; enteral vanco stopped and Immodium provided  10/28 low grade temps, mild leukocytosis    Suggest  monitor off antibiotics  trend WBC  I will follow culture results    discussed with RCU team

## 2024-10-28 NOTE — PROGRESS NOTE ADULT - ASSESSMENT
75 yo F w/h/o controlled T2DM (A1C 5.8%) on insulin at home. Also HTN, HLD, hypothyroidism, RA on Prednisone, Fibromyalgia, cerebral aneurysm s/p repair, chronic hypercapnic respiratory failure s/p trach (vent dependent) and chronic PEG 2022, recurrent C. diff infection (follows with Dr. 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 S/p antibiotics and s/p GJ tube exchanges on 10/14, s/p PEG replacement 10/21. Endocrine consulted for Type 2 DM management while on tube feeding. Patient tolerating TFs with BG not at goal today. Will add back 6am Admelog dose since pt starts TFs at 6am and also get Prednisone at this time causing rebound hyperglycemia. Spoke with pt's daughter and reviewed insulin doses and proposed adjustments to improve BG to BG goal 100-180mg/dL without hypoglycemia.         Home DM medications: Lantus 35 units at HS, Humalog 26 units with # 1 feeding, 22 units with #2 tube feeding, 20 units with #3 tube feeding and  Humalog correction scale (  2 units for -589, 4 units for -250, 6 units for -300, 8units for -350, 10 units for -400, 12 units for -450)   while on [employer] formula 3 cans/day, slow bolus( run at 80 ml/hr total volume 960 ml/day> 1st can around 6:30-8:30, 2nd can around 3 pm, 3rd can around 8-9 pm) plus Banatrol 1/2 packet BID, was increasing to 1 packet BID, plus Kefir 10 oz/day ( divided in multiple times throughout the day).

## 2024-10-28 NOTE — PROGRESS NOTE ADULT - PROBLEM SELECTOR PLAN 1
- Please monitor blood glucose values q 6 hours while on tube feeding or NPO  - C/w Lantus 18 units at 6 am. Will adjust as needed  - Restart Admelog 20u at 0600,  and c/w 27 at 1200, and 13u at 1800 while on continuous TFs (HOLD if tube feeds held and HOLD 1800 dose if TFs are done at 1800 (6P)) If BG <100s prior to Admelog doses can give 50% of the dose.   - C/w moderate dose Admelog correctional scale q6h while on TFs/NPO/overnight   -Will follow  Discharge Plan:  - TBD depending on insulin requirement and discharge tube feeding regimen (Bolus vs continuous tube feeding)   - If bolus tube feeding > Lantus plus Humalog   - Patient should have BGs checked 4x a day while on TFs.    Daughter aware to contact Endocrinologist if BG <70mg/dL x1 or >200mg/dL consistently or >400mg/dL x1.   - Followup with Dr Ruth: [facility]: [address] [place] [address]. [address]. [place] [address]. Tel: [phone]- Telemedicine- daughter to schedule.   - Make sure pt has Rx for all DM supplies and insulin

## 2024-10-29 NOTE — PROGRESS NOTE ADULT - ASSESSMENT
73 yo F w/h/o controlled T2DM (A1C 5.8%) on insulin at home. Also HTN, HLD, hypothyroidism, RA on Prednisone, Fibromyalgia, cerebral aneurysm s/p repair, chronic hypercapnic respiratory failure s/p trach (vent dependent) and chronic PEG 2022, recurrent C. diff infection (follows with Dr. 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 S/p antibiotics and s/p GJ tube exchanges on 10/14, s/p PEG replacement 10/21. Endocrine consulted for Type 2 DM management while on tube feeding. Patient tolerating TFs with BG not at goal but improving. Will adjust 6am Admelog dose for BG <100s and >100s to avoid  rebaound hyperglycemia at 12 noon. BG BG goal 100-180mg/dL without hypoglycemia.         Home DM medications: Lantus 35 units at HS, Humalog 26 units with # 1 feeding, 22 units with #2 tube feeding, 20 units with #3 tube feeding and  Humalog correction scale (  2 units for -660, 4 units for -250, 6 units for -300, 8units for -350, 10 units for -400, 12 units for -450)   while on [employer] formula 3 cans/day, slow bolus( run at 80 ml/hr total volume 960 ml/day> 1st can around 6:30-8:30, 2nd can around 3 pm, 3rd can around 8-9 pm) plus Banatrol 1/2 packet BID, was increasing to 1 packet BID, plus Kefir 10 oz/day ( divided in multiple times throughout the day).

## 2024-10-29 NOTE — PROGRESS NOTE ADULT - PROBLEM SELECTOR PLAN 3
Medical Necessity Clause: This procedure was medically necessary because the lesion that was treated was: Post-Care Instructions: I reviewed with the patient in detail post-care instructions. Patient is to keep the biopsy site dry overnight, and then apply bacitracin twice daily until healed. Patient may apply hydrogen peroxide soaks to remove any crusting. Biopsy Method: Dermablade X Size Of Lesion In Cm (Optional): 0.4 Render Path Notes In Note?: No Anesthesia Type: 1% lidocaine with epinephrine Billing Type: Third-Party Bill Detail Level: Detailed Size Of Lesion In Cm (Required): 0.6 Wound Care: Polysporin ointment X Size Of Lesion In Cm (Optional): 0.8 Consent was obtained from the patient. The risks and benefits to therapy were discussed in detail. Specifically, the risks of infection, scarring, bleeding, prolonged wound healing, incomplete removal, allergy to anesthesia, nerve injury and recurrence were addressed. Prior to the procedure, the treatment site was clearly identified and confirmed by the patient. All components of Universal Protocol/PAUSE Rule completed. Patient with Hx of external Hemmorrhoids  - Bleeding Hemmorrhoids noted on admission exam   - G-J Tube flushed and lavaged no evidence of active bleeding   - CT ABD / Pelvis 10/7: Unchanged gastrocutaneous fistula. Gastrojejunostomy tube is intact.  No focal intra-abdominal/pelvic or subcutaneous collections.  - Occult 10/7: Positive  - Transfused on 10/10 with appropriate response in H+H  - Monitor CBC / + Active Type and screen Medical Necessity Information: It is in your best interest to select a reason for this procedure from the list below. All of these items fulfill various CMS LCD requirements except the new and changing color options. Was A Bandage Applied: Yes Size Of Lesion In Cm (Required): 1.1 Hemostasis: Drysol Notification Instructions: Patient will be notified of pathology results. However, patient instructed to call the office if not contacted within 2 weeks.

## 2024-10-29 NOTE — PROGRESS NOTE ADULT - NS ATTEND AMEND GEN_ALL_CORE FT
75 y/o F w/chronic respiratory failure with hypoxia and hypercapnia s/p trach/PEG (vent dependent), RA on prednisone, DM, cerebral aneurysm s/p repair, and recurrent C. Diff infection admitted for acute on chronic respiratory failure secondary to pseudomonal PNA. Patient also with E. coli UTI w/treatment c/b abx associated diarrhea.    - Continue mechanical ventilation, wean as tolerated  - Completed abx, monitor off abx  - Repeat chest CT as outpatient  - DVT prophylaxis  - Continue home prednisone  - Full code  - Dispo planning

## 2024-10-29 NOTE — PROGRESS NOTE ADULT - SUBJECTIVE AND OBJECTIVE BOX
DIABETES FOLLOW UP NOTE: Saw pt earlier today    Chief Complaint: Endocrine consult requested for management of DM    INTERVAL HX: Pt awaketoday and able to nod yes/no to questions. No pain and feeling better. Per staff, pt tolerating TFs of [employer] [place] at 80cc/hr starting at 6am and ending whenever volume of 960cc iscompleted > (14 to 15 hours/day) BG improving but remains not at goal. Noted BG >200s at 12 noon today after 6am ADMELOG dose was cut by 50% due to FBG <100.  On Prednisone 5mg/day        Review of Systems:  General: As above> able to nod yes/no  Cardiovascular: No chest pain, palpitations  Respiratory: No SOB, no cough  GI: No nausea, vomiting, abdominal pain  Endocrine: No  S&Sx of hypoglycemia    Allergies    walnut (Unknown)  metronidazole (Rash)  Lyrica (Unknown)  penicillin (Unknown)  Pineapple (Unknown)  Tagamet (Unknown)  heparin (Unknown)  Pecans (Unknown)  Hazelnut (Unknown)  meropenem (Rash)    Intolerances      MEDICATIONS:  insulin glargine Injectable (LANTUS) 18 Unit(s) SubCutaneous <User Schedule>  insulin lispro (ADMELOG) corrective regimen sliding scale   SubCutaneous every 6 hours  insulin lispro Injectable (ADMELOG) 13 Unit(s) SubCutaneous <User Schedule>  insulin lispro Injectable (ADMELOG) 28 Unit(s) SubCutaneous <User Schedule>  insulin lispro Injectable (ADMELOG) 20 Unit(s) SubCutaneous <User Schedule>  lactobacillus acidophilus 1 Tablet(s) Oral <User Schedule>  levothyroxine 125 MICROGram(s) Oral <User Schedule>  prednisoLONE acetate 1% Suspension 1 Drop(s) Both EYES every 6 hours  predniSONE  Solution 5 milliGRAM(s) Enteral Tube <User Schedule>      PHYSICAL EXAM:  VITALS: T(C): 36.7 (10-29-24 @ 17:49)  T(F): 98.1 (10-29-24 @ 17:49), Max: 98.6 (10-29-24 @ 13:41)  HR: 83 (10-29-24 @ 17:49) (71 - 93)  BP: 101/43 (10-29-24 @ 17:49) (101/43 - 139/61)  RR:  (18 - 19)  SpO2:  (100% - 100%)  Wt(kg): --  GENERAL: Female laying in bed in NAD. Pvt HHA at bedside  HEENT: Trach in place  Abdomen: Soft, nontender, non distended, PEG in place with TF running at 80cc/hr at time of visit  Extremities: Warm, no edema in all 4 exts  NEURO: Awake and able to nod yes/no to simple questions    LABS:  POCT Blood Glucose.: 214 mg/dL (10-29-24 @ 17:20)  POCT Blood Glucose.: 216 mg/dL (10-29-24 @ 11:37)  POCT Blood Glucose.: 93 mg/dL (10-29-24 @ 05:48)  POCT Blood Glucose.: 182 mg/dL (10-28-24 @ 23:23)  POCT Blood Glucose.: 386 mg/dL (10-28-24 @ 18:27)  POCT Blood Glucose.: 373 mg/dL (10-28-24 @ 11:16)  POCT Blood Glucose.: 144 mg/dL (10-28-24 @ 05:37)  POCT Blood Glucose.: 120 mg/dL (10-27-24 @ 23:36)  POCT Blood Glucose.: 227 mg/dL (10-27-24 @ 18:18)  POCT Blood Glucose.: 186 mg/dL (10-27-24 @ 11:07)  POCT Blood Glucose.: 87 mg/dL (10-27-24 @ 05:52)  POCT Blood Glucose.: 130 mg/dL (10-26-24 @ 23:36)                            7.3    14.19 )-----------( 150      ( 29 Oct 2024 15:02 )             25.5       10-29    133[L]  |  101  |  24[H]  ----------------------------<  193[H]  4.3   |  22  |  <0.30[L]    eGFR: 113    Ca    8.4      10-29  Mg     1.9     10-29  Phos  2.8     10-29    TPro  6.0  /  Alb  2.3[L]  /  TBili  0.3  /  DBili  x   /  AST  23  /  ALT  18  /  AlkPhos  101  10-29    Thyroid Function Tests:  10-16 @ 06:50 TSH 1.74 FreeT4 1.2 T3 -- Anti TPO -- Anti Thyroglobulin Ab -- TSI --      A1C with Estimated Average Glucose Result: 5.8 % (10-14-24 @ 05:08)  A1C with Estimated Average Glucose Result: 6.4 % (08-12-24 @ 07:32)      Estimated Average Glucose: 120 mg/dL (10-14-24 @ 05:08)  Estimated Average Glucose: 137 mg/dL (08-12-24 @ 07:32)

## 2024-10-29 NOTE — PROGRESS NOTE ADULT - NUTRITIONAL ASSESSMENT
Diet, NPO with Tube Feed:   Tube Feeding Modality: Jejunostomy  Casie Corban Direct Peptide 1.5 (KFPEPT1.5RTH)  Total Volume for 24 Hours (mL): 960  Continuous  Until Goal Tube Feed Rate (mL per Hour): 80  Tube Feed Duration (in Hours): 12  Tube Feed Start Time: 06:00  Free Water Flush  Pump   Rate (mL per Hour): 50   Frequency: Every Hour  Free Water Flush Instructions:  50ml free water flushes Q1hr  Alfred(7 Gm Arginine/7 Gm Glut/1.2 Gm HMB     Qty per Day:  2  No Carb Prosource (1pkg = 15gms Protein)     Qty per Day:  1  Banatrol TF     Qty per Day:  1/2 packet per daily (10-26-24 @ 15:36) [Active]          Please see RD assessment and/or follow up.  Managed by primary team as well

## 2024-10-29 NOTE — PROGRESS NOTE ADULT - SUBJECTIVE AND OBJECTIVE BOX
Patient is a 74y old  Female who presents with a chief complaint of Patient admitted with Hypoxemia and episode of Bright red blood per rectum (28 Oct 2024 17:31)      Interval Events:    REVIEW OF SYSTEMS:  [ ] Positive  [ ] All other systems negative  [ ] Unable to assess ROS because ________    Vital Signs Last 24 Hrs  T(C): 36.6 (10-29-24 @ 04:40), Max: 37 (10-28-24 @ 12:25)  T(F): 97.9 (10-29-24 @ 04:40), Max: 98.6 (10-28-24 @ 12:25)  HR: 75 (10-29-24 @ 05:49) (71 - 90)  BP: 139/61 (10-29-24 @ 04:40) (125/53 - 143/60)  RR: 18 (10-29-24 @ 04:40) (18 - 22)  SpO2: 100% (10-29-24 @ 05:49) (98% - 100%)PHYSICAL EXAM:  HEENT:   [ ]Tracheostomy:  [ ]Pupils equal  [ ]No oral lesions  [ ]Abnormal        SKIN  [ ]No Rash  [ ] Abnormal  [ ] pressure    CARDIAC  [ ]Regular  [ ]Abnormal    PULMONARY  [ ]Bilateral Clear Breath Sounds  [ ]Normal Excursion  [ ]Abnormal    GI  [ ]PEG      [ ] +BS		              [ ]Soft, nondistended, nontender	  [ ]Abnormal    MUSCULOSKELETAL                                   [ ]Bedbound                 [ ]Abnormal    [ ]Ambulatory/OOB to chair                           EXTREMITIES                                         [ ]Normal  [ ]Edema                           NEUROLOGIC  [ ] Normal, non focal  [ ] Focal findings:    PSYCHIATRIC  [ ]Alert and appropriate  [ ] Sedated	 [ ]Agitated    :  Mcbride: [ ] Yes, if yes: Date of Placement:                   [  ] No    LINES: Central Lines [ ] Yes, if yes: Date of Placement                                     [  ] No    HOSPITAL MEDICATIONS:  MEDICATIONS  (STANDING):  acetaminophen   Oral Liquid .. 650 milliGRAM(s) Enteral Tube every 8 hours  albuterol/ipratropium for Nebulization 3 milliLiter(s) Nebulizer every 6 hours  artificial  tears Solution 1 Drop(s) Both EYES every 6 hours  ascorbic acid 500 milliGRAM(s) Oral daily  Biotene Dry Mouth Oral Rinse 5 milliLiter(s) Swish and Spit every 6 hours  calcium carbonate 1250 mG  + Vitamin D (OsCal 500 + D) 1 Tablet(s) Oral <User Schedule>  chlorhexidine 0.12% Liquid 15 milliLiter(s) Oral Mucosa every 12 hours  chlorhexidine 4% Liquid 1 Application(s) Topical daily  dextrose 5%. 1000 milliLiter(s) (100 mL/Hr) IV Continuous <Continuous>  dextrose 5%. 1000 milliLiter(s) (50 mL/Hr) IV Continuous <Continuous>  dextrose 50% Injectable 25 Gram(s) IV Push once  dextrose 50% Injectable 12.5 Gram(s) IV Push once  diclofenac sodium 1% Gel 2 Gram(s) Topical every 6 hours  escitalopram 10 milliGRAM(s) Oral <User Schedule>  gabapentin Solution 250 milliGRAM(s) Oral <User Schedule>  glucagon  Injectable 1 milliGRAM(s) IntraMuscular once  hemorrhoidal Ointment 1 Application(s) Rectal at bedtime  influenza  Vaccine (HIGH DOSE) 0.5 milliLiter(s) IntraMuscular once  insulin glargine Injectable (LANTUS) 18 Unit(s) SubCutaneous <User Schedule>  insulin lispro (ADMELOG) corrective regimen sliding scale   SubCutaneous every 6 hours  insulin lispro Injectable (ADMELOG) 13 Unit(s) SubCutaneous <User Schedule>  insulin lispro Injectable (ADMELOG) 27 Unit(s) SubCutaneous <User Schedule>  insulin lispro Injectable (ADMELOG) 20 Unit(s) SubCutaneous <User Schedule>  lactobacillus acidophilus 1 Tablet(s) Oral <User Schedule>  levothyroxine 125 MICROGram(s) Oral <User Schedule>  lidocaine   4% Patch 4 Patch Transdermal every 24 hours  melatonin 12 milliGRAM(s) Oral <User Schedule>  nystatin Powder 1 Application(s) Topical every 8 hours  pantoprazole  Injectable 40 milliGRAM(s) IV Push every 12 hours  prednisoLONE acetate 1% Suspension 1 Drop(s) Both EYES every 6 hours  predniSONE  Solution 5 milliGRAM(s) Enteral Tube <User Schedule>  QUEtiapine 12.5 milliGRAM(s) Oral <User Schedule>  sodium chloride 1 Gram(s) Oral every 12 hours  sodium chloride 0.65% Nasal 1 Spray(s) Both Nostrils every 6 hours  witch hazel Pads 1 Application(s) Topical every 12 hours    MEDICATIONS  (PRN):  aluminum hydroxide/magnesium hydroxide/simethicone Suspension 30 milliLiter(s) Enteral Tube every 4 hours PRN Dyspepsia      LABS:                        8.2    11.46 )-----------( 146      ( 28 Oct 2024 13:05 )             28.9     10-28    129[L]  |  97  |  25[H]  ----------------------------<  402[H]  5.3   |  24  |  <0.30[L]    Ca    8.0[L]      28 Oct 2024 13:05  Phos  3.3     10-28  Mg     2.1     10-28    TPro  6.2  /  Alb  2.3[L]  /  TBili  0.4  /  DBili  x   /  AST  40  /  ALT  22  /  AlkPhos  139[H]  10-28      Urinalysis Basic - ( 28 Oct 2024 13:05 )    Color: x / Appearance: x / SG: x / pH: x  Gluc: 402 mg/dL / Ketone: x  / Bili: x / Urobili: x   Blood: x / Protein: x / Nitrite: x   Leuk Esterase: x / RBC: x / WBC x   Sq Epi: x / Non Sq Epi: x / Bacteria: x          CAPILLARY BLOOD GLUCOSE    MICROBIOLOGY:     RADIOLOGY:  [ ] Reviewed and interpreted by me    Mode: AC/ CMV (Assist Control/ Continuous Mandatory Ventilation)  RR (machine): 18  TV (machine): 350  FiO2: 30  PEEP: 5  ITime: 1  MAP: 10  PIP: 25   Patient is a 74y old  Female who presents with a chief complaint of Patient admitted with Hypoxemia and episode of Bright red blood per rectum (28 Oct 2024 17:31)    Interval Events:  No acute events overnight.     REVIEW OF SYSTEMS:  [ ] Positive  [X] All other systems negative  [ ] Unable to assess ROS because ________    Vital Signs Last 24 Hrs  T(C): 36.6 (10-29-24 @ 04:40), Max: 37 (10-28-24 @ 12:25)  T(F): 97.9 (10-29-24 @ 04:40), Max: 98.6 (10-28-24 @ 12:25)  HR: 75 (10-29-24 @ 05:49) (71 - 90)  BP: 139/61 (10-29-24 @ 04:40) (125/53 - 143/60)  RR: 18 (10-29-24 @ 04:40) (18 - 22)  SpO2: 100% (10-29-24 @ 05:49) (98% - 100%)    PHYSICAL EXAM:  HEENT:   [X]Tracheostomy:  #8 XLT shiley   [X]Pupils equal  [ ]No oral lesions  [ ]Abnormal    SKIN  [ ]No Rash  [ ] Abnormal  [X] pressure - sacral unstageable     CARDIAC  [X]Regular  [ ]Abnormal    PULMONARY  [ ]Bilateral Clear Breath Sounds  [ ]Normal Excursion  [X]Abnormal - mild coarse BS bilaterally     GI  [X]PEGJ      [X] +BS		              [X]Soft, nondistended, nontender	  [X]Abnormal    MUSCULOSKELETAL                                   [X]Bedbound                 [ ]Abnormal    [ ]Ambulatory/OOB to chair                           EXTREMITIES                                         [ ]Normal  [X]Edema - generalized pitting edema                           NEUROLOGIC  [ ] Normal, non focal  [X] Focal findings: follows commands on all 4 extremities, attempts to communicate by mouthing words     PSYCHIATRIC  [X]Alert and appropriate  [ ] Sedated	 [ ]Agitated    :  Mcbride: [ ] Yes, if yes: Date of Placement:                   [X] No    LINES: Central Lines [ ] Yes, if yes: Date of Placement                                     [X] No    HOSPITAL MEDICATIONS:  MEDICATIONS  (STANDING):  acetaminophen   Oral Liquid .. 650 milliGRAM(s) Enteral Tube every 8 hours  albuterol/ipratropium for Nebulization 3 milliLiter(s) Nebulizer every 6 hours  artificial  tears Solution 1 Drop(s) Both EYES every 6 hours  ascorbic acid 500 milliGRAM(s) Oral daily  Biotene Dry Mouth Oral Rinse 5 milliLiter(s) Swish and Spit every 6 hours  calcium carbonate 1250 mG  + Vitamin D (OsCal 500 + D) 1 Tablet(s) Oral <User Schedule>  chlorhexidine 0.12% Liquid 15 milliLiter(s) Oral Mucosa every 12 hours  chlorhexidine 4% Liquid 1 Application(s) Topical daily  dextrose 5%. 1000 milliLiter(s) (100 mL/Hr) IV Continuous <Continuous>  dextrose 5%. 1000 milliLiter(s) (50 mL/Hr) IV Continuous <Continuous>  dextrose 50% Injectable 25 Gram(s) IV Push once  dextrose 50% Injectable 12.5 Gram(s) IV Push once  diclofenac sodium 1% Gel 2 Gram(s) Topical every 6 hours  escitalopram 10 milliGRAM(s) Oral <User Schedule>  gabapentin Solution 250 milliGRAM(s) Oral <User Schedule>  glucagon  Injectable 1 milliGRAM(s) IntraMuscular once  hemorrhoidal Ointment 1 Application(s) Rectal at bedtime  influenza  Vaccine (HIGH DOSE) 0.5 milliLiter(s) IntraMuscular once  insulin glargine Injectable (LANTUS) 18 Unit(s) SubCutaneous <User Schedule>  insulin lispro (ADMELOG) corrective regimen sliding scale   SubCutaneous every 6 hours  insulin lispro Injectable (ADMELOG) 13 Unit(s) SubCutaneous <User Schedule>  insulin lispro Injectable (ADMELOG) 27 Unit(s) SubCutaneous <User Schedule>  insulin lispro Injectable (ADMELOG) 20 Unit(s) SubCutaneous <User Schedule>  lactobacillus acidophilus 1 Tablet(s) Oral <User Schedule>  levothyroxine 125 MICROGram(s) Oral <User Schedule>  lidocaine   4% Patch 4 Patch Transdermal every 24 hours  melatonin 12 milliGRAM(s) Oral <User Schedule>  nystatin Powder 1 Application(s) Topical every 8 hours  pantoprazole  Injectable 40 milliGRAM(s) IV Push every 12 hours  prednisoLONE acetate 1% Suspension 1 Drop(s) Both EYES every 6 hours  predniSONE  Solution 5 milliGRAM(s) Enteral Tube <User Schedule>  QUEtiapine 12.5 milliGRAM(s) Oral <User Schedule>  sodium chloride 1 Gram(s) Oral every 12 hours  sodium chloride 0.65% Nasal 1 Spray(s) Both Nostrils every 6 hours  witch hazel Pads 1 Application(s) Topical every 12 hours    MEDICATIONS  (PRN):  aluminum hydroxide/magnesium hydroxide/simethicone Suspension 30 milliLiter(s) Enteral Tube every 4 hours PRN Dyspepsia    LABS:                        8.2    11.46 )-----------( 146      ( 28 Oct 2024 13:05 )             28.9     10-28    129[L]  |  97  |  25[H]  ----------------------------<  402[H]  5.3   |  24  |  <0.30[L]    Ca    8.0[L]      28 Oct 2024 13:05  Phos  3.3     10-28  Mg     2.1     10-28    TPro  6.2  /  Alb  2.3[L]  /  TBili  0.4  /  DBili  x   /  AST  40  /  ALT  22  /  AlkPhos  139[H]  10-28    Urinalysis Basic - ( 28 Oct 2024 13:05 )    Color: x / Appearance: x / SG: x / pH: x  Gluc: 402 mg/dL / Ketone: x  / Bili: x / Urobili: x   Blood: x / Protein: x / Nitrite: x   Leuk Esterase: x / RBC: x / WBC x   Sq Epi: x / Non Sq Epi: x / Bacteria: x    CAPILLARY BLOOD GLUCOSE    MICROBIOLOGY:     RADIOLOGY:  [ ] Reviewed and interpreted by me    Mode: AC/ CMV (Assist Control/ Continuous Mandatory Ventilation)  RR (machine): 18  TV (machine): 350  FiO2: 30  PEEP: 5  ITime: 1  MAP: 10  PIP: 25

## 2024-10-29 NOTE — PROGRESS NOTE ADULT - PROBLEM SELECTOR PLAN 10
- Na 130+ on admission; improved   - As per daughter pt usually receives 70 cc/hr of free water flushes per hr however has been reduced during inpatient stay. - Na 130+ on admission; improved   - As per daughter pt usually receives 70 cc/hr of free water flushes per hr however has been reduced during inpatient stay

## 2024-10-29 NOTE — PROGRESS NOTE ADULT - PROBLEM SELECTOR PLAN 1
- Please monitor blood glucose values q 6 hours while on tube feeding or NPO  - C/w Lantus 18 units at 6 am. Will adjust as needed  - Adjust Admelog to 18 u at 0600 if FBG <100s administer 14 units instead   - Adjust Admelog  to 28 units at 1200, and 13u at 1800 while on continuous TFs (HOLD if tube feeds held and HOLD 1800 dose if TFs are done at 1800 (6P)) If BG <100s prior to Admelog doses can give 50% of the dose.   - C/w moderate dose Admelog correctional scale q6h while on TFs/NPO/overnight   -Will follow  Discharge Plan:  - TBD depending on insulin requirement and discharge tube feeding regimen (Bolus vs continuous tube feeding)   - If bolus tube feeding > Lantus plus Humalog   - Patient should have BGs checked 4x a day while on TFs.    Daughter aware to contact Endocrinologist if BG <70mg/dL x1 or >200mg/dL consistently or >400mg/dL x1.   - Followup with Dr Ruth: [facility]: [address] [place] [address]. [address]. [place] [address]. Tel: [phone]- Telemedicine- daughter to schedule.   - Make sure pt has Rx for all DM supplies and insulin

## 2024-10-29 NOTE — PROGRESS NOTE ADULT - ASSESSMENT
73 yo F PMH T2DM on insulin, HTN, HLD, hypothyroidism, RA on Prednisone, Fibromyalgia, cerebral aneurysm s/p repair, chronic hypercapnic respiratory failure s/p trach (vent dependent) and chronic PEG 2022, recurrent C. diff infection (follows with Dr. 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  Pt also noted to have rectal bleeding this past week requiring transfusion 5 days ago in ED as per daughter. Daughter also reports brownish discharge from G-J tube. In ED, pt afebrile, fiO2 96-98% on 40% on ventilator.  Chest X-ray w/ opacification of right lung concerning for possible PNA.  Admitted to [facility] for further management. Sputum cxs grew PSA; treated with course of Cefepime. Patient with decreased H+H received 1 unit of PRBCS on 10/10.  10/14 EGD w/ Dr. Rosales for PEGJ exchange and clippings placed for closure of fistula site.  Persistent fevers treated with meropenem and vanc (d/c'd 10/20).  CT A/P without infectious source.  Continue airway management with duonebs, chest PT and suction PRN.         10/28:  WBC improving off antibiotics.  Hgb stable.  Pt appears nontoxic and at baseline mental status.  Discussed with Dr. Newman, will continue to observe off antibiotics.  Diarrhea improving, will reassess removal of rectal tube tomorrow. 75 yo F PMH T2DM on insulin, HTN, HLD, hypothyroidism, RA on Prednisone, Fibromyalgia, cerebral aneurysm s/p repair, chronic hypercapnic respiratory failure s/p trach (vent dependent) and chronic PEG 2022, recurrent C. diff infection (follows with Dr. 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  Pt also noted to have rectal bleeding this past week requiring transfusion 5 days ago in ED as per daughter. Daughter also reports brownish discharge from G-J tube. In ED, pt afebrile, fiO2 96-98% on 40% on ventilator.  Chest X-ray w/ opacification of right lung concerning for possible PNA.  Admitted to RCU for further management. Sputum cxs grew PSA; treated with course of Cefepime. Patient with decreased H+H received 1 unit of PRBCS on 10/10.  10/14 EGD w/ Dr. Rosales for PEGJ exchange and clippings placed for closure of fistula site.  Persistent fevers treated with meropenem and vanc (d/c'd 10/20).  CT A/P without infectious source.  Continue airway management with duonebs, chest PT and suction PRN.         10/29:  Abdominal Xray performed for abdominal pain as per pt - nonobstructive bowel gas pattern.  Daughter requesting optho, podiatry, heme/onc.  Optho will come and see pt bedside.  Arranged podiatry house call.  Heme/Onc recs iron studies with AM labs and then f/u with them when they have results.  Rectal tube fell out spontaneously last night, did not replace it as diarrhea improved.  G tube with increased output over the past couple of days - as per Marysol Rosales is going to come to bedside to evaluate.

## 2024-10-30 NOTE — PROGRESS NOTE ADULT - SUBJECTIVE AND OBJECTIVE BOX
Patient is a 74y old  Female who presents with a chief complaint of Patient admitted with Hypoxemia and episode of Bright red blood per rectum (29 Oct 2024 17:54)      Interval Events: No events reported overnight; will transfuse one unit of PRBCs     REVIEW OF SYSTEMS:  [ ] Positive  [x] All other systems negative  [ ] Unable to assess ROS because ________    Vital Signs Last 24 Hrs  T(C): 36.7 (10-30-24 @ 05:35), Max: 37 (10-29-24 @ 13:41)  T(F): 98.1 (10-30-24 @ 05:35), Max: 98.6 (10-29-24 @ 13:41)  HR: 85 (10-30-24 @ 09:26) (70 - 100)  BP: 126/53 (10-30-24 @ 05:35) (101/43 - 126/53)  RR: 18 (10-30-24 @ 05:35) (18 - 18)  SpO2: 100% (10-30-24 @ 09:26) (100% - 100%)    PHYSICAL EXAM:  HEENT:   [X]Tracheostomy:  #8 XLT shiley   [X]Pupils equal  [ ]No oral lesions  [ ]Abnormal    SKIN  [ ]No Rash  [ ] Abnormal  [X] pressure - sacral unstageable     CARDIAC  [X]Regular  [ ]Abnormal    PULMONARY  [ ]Bilateral Clear Breath Sounds  [ ]Normal Excursion  [X]Abnormal - mild coarse BS bilaterally     GI  [X]PEGJ      [X] +BS		              [X]Soft, nondistended, nontender	  [X]Abnormal; + Gastrocutaneous fistula with minimal leakage     MUSCULOSKELETAL                                   [X]Bedbound                 [ ]Abnormal    [ ]Ambulatory/OOB to chair                           EXTREMITIES                                         [ ]Normal  [X]Edema - generalized pitting edema                           NEUROLOGIC  [ ] Normal, non focal  [X] Focal findings: follows commands on all 4 extremities, attempts to communicate by mouthing words     PSYCHIATRIC  [X]Alert and appropriate  [ ] Sedated	 [ ]Agitated    :  Mcbride: [ ] Yes, if yes: Date of Placement:                   [X] No    LINES: Central Lines [ ] Yes, if yes: Date of Placement                                     [X] No      HOSPITAL MEDICATIONS:  MEDICATIONS  (STANDING):  acetaminophen   Oral Liquid .. 650 milliGRAM(s) Enteral Tube every 8 hours  albuterol/ipratropium for Nebulization 3 milliLiter(s) Nebulizer every 6 hours  artificial  tears Solution 1 Drop(s) Both EYES every 6 hours  ascorbic acid 500 milliGRAM(s) Oral daily  Biotene Dry Mouth Oral Rinse 5 milliLiter(s) Swish and Spit every 6 hours  calcium carbonate 1250 mG  + Vitamin D (OsCal 500 + D) 1 Tablet(s) Oral <User Schedule>  chlorhexidine 0.12% Liquid 15 milliLiter(s) Oral Mucosa every 12 hours  chlorhexidine 4% Liquid 1 Application(s) Topical daily  dextrose 5%. 1000 milliLiter(s) (100 mL/Hr) IV Continuous <Continuous>  dextrose 5%. 1000 milliLiter(s) (50 mL/Hr) IV Continuous <Continuous>  dextrose 50% Injectable 25 Gram(s) IV Push once  dextrose 50% Injectable 12.5 Gram(s) IV Push once  diclofenac sodium 1% Gel 2 Gram(s) Topical every 6 hours  escitalopram 10 milliGRAM(s) Oral <User Schedule>  fentaNYL   Patch  12 MICROgram(s)/Hr 1 Patch Transdermal every 72 hours  fentaNYL   Patch  25 MICROgram(s)/Hr 1 Patch Transdermal every 72 hours  gabapentin Solution 250 milliGRAM(s) Oral <User Schedule>  glucagon  Injectable 1 milliGRAM(s) IntraMuscular once  hemorrhoidal Ointment 1 Application(s) Rectal at bedtime  influenza  Vaccine (HIGH DOSE) 0.5 milliLiter(s) IntraMuscular once  insulin glargine Injectable (LANTUS) 18 Unit(s) SubCutaneous <User Schedule>  insulin lispro (ADMELOG) corrective regimen sliding scale   SubCutaneous every 6 hours  insulin lispro Injectable (ADMELOG) 18 Unit(s) SubCutaneous <User Schedule>  insulin lispro Injectable (ADMELOG) 28 Unit(s) SubCutaneous <User Schedule>  insulin lispro Injectable (ADMELOG) 13 Unit(s) SubCutaneous <User Schedule>  lactobacillus acidophilus 1 Tablet(s) Oral <User Schedule>  levothyroxine 125 MICROGram(s) Oral <User Schedule>  lidocaine   4% Patch 4 Patch Transdermal every 24 hours  melatonin 12 milliGRAM(s) Oral <User Schedule>  nystatin Powder 1 Application(s) Topical every 8 hours  pantoprazole  Injectable 40 milliGRAM(s) IV Push every 12 hours  prednisoLONE acetate 1% Suspension 1 Drop(s) Both EYES every 6 hours  predniSONE  Solution 5 milliGRAM(s) Enteral Tube <User Schedule>  QUEtiapine 12.5 milliGRAM(s) Oral <User Schedule>  sodium chloride 1 Gram(s) Oral every 12 hours  sodium chloride 0.65% Nasal 1 Spray(s) Both Nostrils every 6 hours  witch hazel Pads 1 Application(s) Topical every 12 hours    MEDICATIONS  (PRN):  aluminum hydroxide/magnesium hydroxide/simethicone Suspension 30 milliLiter(s) Enteral Tube every 4 hours PRN Dyspepsia  HYDROmorphone  Injectable 0.2 milliGRAM(s) IV Push once PRN Severe Pain (7 - 10)      LABS:                        6.8    11.06 )-----------( 156      ( 30 Oct 2024 09:42 )             24.3     10-30    136  |  101  |  25[H]  ----------------------------<  113[H]  4.2   |  22  |  <0.30[L]    Ca    8.5      30 Oct 2024 06:36  Phos  3.3     10-30  Mg     2.0     10-30    TPro  5.8[L]  /  Alb  1.9[L]  /  TBili  0.3  /  DBili  x   /  AST  20  /  ALT  17  /  AlkPhos  92  10-30      Urinalysis Basic - ( 30 Oct 2024 06:36 )    Color: x / Appearance: x / SG: x / pH: x  Gluc: 113 mg/dL / Ketone: x  / Bili: x / Urobili: x   Blood: x / Protein: x / Nitrite: x   Leuk Esterase: x / RBC: x / WBC x   Sq Epi: x / Non Sq Epi: x / Bacteria: x          CAPILLARY BLOOD GLUCOSE    MICROBIOLOGY:     RADIOLOGY:  [ ] Reviewed and interpreted by me    Mode: AC/ CMV (Assist Control/ Continuous Mandatory Ventilation)  RR (machine): 18  TV (machine): 350  FiO2: 30  PEEP: 5  ITime: 1  MAP: 10  PIP: 31

## 2024-10-30 NOTE — PROGRESS NOTE ADULT - PROBLEM SELECTOR PLAN 4
Pre prandial breakfast blood glucose was 106, patient received 41.4 unit bolus via insulin pump at 0930.     Pre-prandial lunch blood glucose was 189, patient received 38.06 unit bolus via insulin pump at 1452.     Pre prandial dinner blood glucose was 173, patient received 13.33 unit bolus via insulin pump at 1640. Due to refill pump, collaborating with pharmacy and endocrine for insulin pump refill. See MAR.    - Amolodipine d/c'd 10/10 in setting of borderline bp   - required IVF boluses for hypotension  - Monitor BP

## 2024-10-30 NOTE — CONSULT NOTE ADULT - SUBJECTIVE AND OBJECTIVE BOX
[facility] DEPARTMENT OF OPHTHALMOLOGY - INITIAL ADULT CONSULT  ----------------------------------------------------------------------------------------------------  Alber Cobian MD PGY-3  Available on teams  ----------------------------------------------------------------------------------------------------    HPI:  75 yo F PMH T2DM on insulin, HTN, HLD, hypothyroidism, RA on Prednisone, Fibromyalgia, cerebral aneurysm s/p repair, chronic hypercapnic respiratory failure s/p trach (vent dependent) and chronic PEG 2022, recurrent C. diff infection (follows with Dr. Newman), persistent GJ tube leaks now s/p GC fistula repair 8/12 BIBEMS with daughter for respiratory distress, hypoxia to high 80s.  Pt also noted to have rectal bleeding this past week requiring transfusion 5 days ago in ED as per daughter.  In ED, pt afebrile, fiO2 96-98% on 40% on ventilator.  Chest Xray w/ opacification of right lung concerning for possible PNA.  Admitted to RCU for further management.      (07 Oct 2024 18:58)    Interval History: Consulted bc about pt's need for pred forte gtt. Pt has been on it since April 2024 when last seen by ophthalmology.    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 650 milliGRAM(s) Enteral Tube every 8 hours  albuterol/ipratropium for Nebulization 3 milliLiter(s) Nebulizer every 6 hours  artificial  tears Solution 1 Drop(s) Both EYES every 6 hours  ascorbic acid 500 milliGRAM(s) Oral daily  Biotene Dry Mouth Oral Rinse 5 milliLiter(s) Swish and Spit every 6 hours  calcium carbonate 1250 mG  + Vitamin D (OsCal 500 + D) 1 Tablet(s) Oral <User Schedule>  chlorhexidine 0.12% Liquid 15 milliLiter(s) Oral Mucosa every 12 hours  chlorhexidine 4% Liquid 1 Application(s) Topical daily  dextrose 5%. 1000 milliLiter(s) (100 mL/Hr) IV Continuous <Continuous>  dextrose 5%. 1000 milliLiter(s) (50 mL/Hr) IV Continuous <Continuous>  dextrose 50% Injectable 25 Gram(s) IV Push once  dextrose 50% Injectable 12.5 Gram(s) IV Push once  diclofenac sodium 1% Gel 2 Gram(s) Topical every 6 hours  escitalopram 10 milliGRAM(s) Oral <User Schedule>  fentaNYL   Patch  12 MICROgram(s)/Hr 1 Patch Transdermal every 72 hours  fentaNYL   Patch  25 MICROgram(s)/Hr 1 Patch Transdermal every 72 hours  gabapentin Solution 250 milliGRAM(s) Oral <User Schedule>  glucagon  Injectable 1 milliGRAM(s) IntraMuscular once  hemorrhoidal Ointment 1 Application(s) Rectal at bedtime  influenza  Vaccine (HIGH DOSE) 0.5 milliLiter(s) IntraMuscular once  insulin glargine Injectable (LANTUS) 18 Unit(s) SubCutaneous <User Schedule>  insulin lispro (ADMELOG) corrective regimen sliding scale   SubCutaneous every 6 hours  insulin lispro Injectable (ADMELOG) 18 Unit(s) SubCutaneous <User Schedule>  insulin lispro Injectable (ADMELOG) 28 Unit(s) SubCutaneous <User Schedule>  insulin lispro Injectable (ADMELOG) 13 Unit(s) SubCutaneous <User Schedule>  lactobacillus acidophilus 1 Tablet(s) Oral <User Schedule>  levothyroxine 125 MICROGram(s) Oral <User Schedule>  lidocaine   4% Patch 4 Patch Transdermal every 24 hours  loperamide Liquid 2 milliGRAM(s) Enteral Tube every 12 hours  melatonin 12 milliGRAM(s) Oral <User Schedule>  nystatin Powder 1 Application(s) Topical every 8 hours  pantoprazole  Injectable 40 milliGRAM(s) IV Push every 12 hours  predniSONE  Solution 5 milliGRAM(s) Enteral Tube <User Schedule>  QUEtiapine 12.5 milliGRAM(s) Oral <User Schedule>  sodium chloride 1 Gram(s) Oral every 12 hours  sodium chloride 0.65% Nasal 1 Spray(s) Both Nostrils every 6 hours  witch hazel Pads 1 Application(s) Topical every 12 hours    MEDICATIONS  (PRN):  aluminum hydroxide/magnesium hydroxide/simethicone Suspension 30 milliLiter(s) Enteral Tube every 4 hours PRN Dyspepsia  HYDROmorphone  Injectable 0.2 milliGRAM(s) IV Push once PRN Severe Pain (7 - 10)    Allergies & Intolerances:   walnut (Unknown)  metronidazole (Rash)  Lyrica (Unknown)  penicillin (Unknown)  Pineapple (Unknown)  Tagamet (Unknown)  heparin (Unknown)  Pecans (Unknown)  Hazelnut (Unknown)  meropenem (Rash)    Review of Systems:  Constitutional: No fever, chills  Eyes: No blurry vision, flashes, floaters, FBS, erythema, discharge, double vision, OU  Neuro: No tremors  Cardiovascular: No chest pain, palpitations  Respiratory: No SOB, no cough  GI: No nausea, vomiting, abdominal pain  : No dysuria  Skin: no rash  Psych: no depression  Endocrine: no polyuria, polydipsia  Heme/lymph: no swelling    VITALS: T(C): 37.2 (10-30-24 @ 12:00)  T(F): 98.9 (10-30-24 @ 12:00), Max: 98.9 (10-30-24 @ 12:00)  HR: 89 (10-30-24 @ 12:00) (70 - 100)  BP: 107/50 (10-30-24 @ 12:00) (101/43 - 126/53)  RR:  (18 - 18)  SpO2:  (99% - 100%)  Wt(kg): --  General: trach    Ophthalmology Exam:  Visual acuity (sc): FARAZ 2/2 trach and participation  Pupils: PERRL OU, no APD  Ttono: 22 OD, 18 OS  Extraocular movements (EOMs): tr abduction deficit, symmetric, no pain, no diplopia  Confrontational Visual Field (CVF): FARAZ 2/2 trach and participation  Color Plates: FARAZ 2/2 trach and participation    Pen Light Exam (PLE)  External: Flat OU  Lids/Lashes/Lacrimal Ducts: chalazion RUL OD ; Flat OS  Sclera/Conjunctiva: scleromalacia perforans OU  Cornea: OD: 3 + SPK ; OS: 4+ SPK   Anterior Chamber: D+F OU    Iris: Flat OU  Lens: PCIOL OD; OS: dense cataract OS     Fundus Exam: dilated with 1% tropicamide and 2.5% phenylephrine  Approval obtained from primary team for dilation  Patient aware that pupils can remained dilated for at least 4-6 hours  Exam performed with 20D lens    Vitreous: wnl OU  Disc, cup/disc: sharp and pink, 0.4 OU  Macula: OD: few DBH OD ; OS: few DBH   Vessels: wnl OU  Periphery: wnl OU [facility] DEPARTMENT OF OPHTHALMOLOGY - INITIAL ADULT CONSULT  ----------------------------------------------------------------------------------------------------  Alber Cobian MD PGY-3  Available on teams  ----------------------------------------------------------------------------------------------------    HPI:  75 yo F PMH T2DM on insulin, HTN, HLD, hypothyroidism, RA on Prednisone, Fibromyalgia, cerebral aneurysm s/p repair, chronic hypercapnic respiratory failure s/p trach (vent dependent) and chronic PEG 2022, recurrent C. diff infection (follows with Dr. Newmna), persistent GJ tube leaks now s/p GC fistula repair 8/12 BIBEMS with daughter for respiratory distress, hypoxia to high 80s.  Pt also noted to have rectal bleeding this past week requiring transfusion 5 days ago in ED as per daughter.  In ED, pt afebrile, fiO2 96-98% on 40% on ventilator.  Chest Xray w/ opacification of right lung concerning for possible PNA.  Admitted to RCU for further management.      (07 Oct 2024 18:58)    Interval History: Consulted bc about pt's need for pred forte gtt. Pt has been on it since April 2024 when last seen by ophthalmology.    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 650 milliGRAM(s) Enteral Tube every 8 hours  albuterol/ipratropium for Nebulization 3 milliLiter(s) Nebulizer every 6 hours  artificial  tears Solution 1 Drop(s) Both EYES every 6 hours  ascorbic acid 500 milliGRAM(s) Oral daily  Biotene Dry Mouth Oral Rinse 5 milliLiter(s) Swish and Spit every 6 hours  calcium carbonate 1250 mG  + Vitamin D (OsCal 500 + D) 1 Tablet(s) Oral <User Schedule>  chlorhexidine 0.12% Liquid 15 milliLiter(s) Oral Mucosa every 12 hours  chlorhexidine 4% Liquid 1 Application(s) Topical daily  dextrose 5%. 1000 milliLiter(s) (100 mL/Hr) IV Continuous <Continuous>  dextrose 5%. 1000 milliLiter(s) (50 mL/Hr) IV Continuous <Continuous>  dextrose 50% Injectable 25 Gram(s) IV Push once  dextrose 50% Injectable 12.5 Gram(s) IV Push once  diclofenac sodium 1% Gel 2 Gram(s) Topical every 6 hours  escitalopram 10 milliGRAM(s) Oral <User Schedule>  fentaNYL   Patch  12 MICROgram(s)/Hr 1 Patch Transdermal every 72 hours  fentaNYL   Patch  25 MICROgram(s)/Hr 1 Patch Transdermal every 72 hours  gabapentin Solution 250 milliGRAM(s) Oral <User Schedule>  glucagon  Injectable 1 milliGRAM(s) IntraMuscular once  hemorrhoidal Ointment 1 Application(s) Rectal at bedtime  influenza  Vaccine (HIGH DOSE) 0.5 milliLiter(s) IntraMuscular once  insulin glargine Injectable (LANTUS) 18 Unit(s) SubCutaneous <User Schedule>  insulin lispro (ADMELOG) corrective regimen sliding scale   SubCutaneous every 6 hours  insulin lispro Injectable (ADMELOG) 18 Unit(s) SubCutaneous <User Schedule>  insulin lispro Injectable (ADMELOG) 28 Unit(s) SubCutaneous <User Schedule>  insulin lispro Injectable (ADMELOG) 13 Unit(s) SubCutaneous <User Schedule>  lactobacillus acidophilus 1 Tablet(s) Oral <User Schedule>  levothyroxine 125 MICROGram(s) Oral <User Schedule>  lidocaine   4% Patch 4 Patch Transdermal every 24 hours  loperamide Liquid 2 milliGRAM(s) Enteral Tube every 12 hours  melatonin 12 milliGRAM(s) Oral <User Schedule>  nystatin Powder 1 Application(s) Topical every 8 hours  pantoprazole  Injectable 40 milliGRAM(s) IV Push every 12 hours  predniSONE  Solution 5 milliGRAM(s) Enteral Tube <User Schedule>  QUEtiapine 12.5 milliGRAM(s) Oral <User Schedule>  sodium chloride 1 Gram(s) Oral every 12 hours  sodium chloride 0.65% Nasal 1 Spray(s) Both Nostrils every 6 hours  witch hazel Pads 1 Application(s) Topical every 12 hours    MEDICATIONS  (PRN):  aluminum hydroxide/magnesium hydroxide/simethicone Suspension 30 milliLiter(s) Enteral Tube every 4 hours PRN Dyspepsia  HYDROmorphone  Injectable 0.2 milliGRAM(s) IV Push once PRN Severe Pain (7 - 10)    Allergies & Intolerances:   walnut (Unknown)  metronidazole (Rash)  Lyrica (Unknown)  penicillin (Unknown)  Pineapple (Unknown)  Tagamet (Unknown)  heparin (Unknown)  Pecans (Unknown)  Hazelnut (Unknown)  meropenem (Rash)    Review of Systems:  Constitutional: No fever, chills  Eyes: No blurry vision, flashes, floaters, FBS, erythema, discharge, double vision, OU  Neuro: No tremors  Cardiovascular: No chest pain, palpitations  Respiratory: No SOB, no cough  GI: No nausea, vomiting, abdominal pain  : No dysuria  Skin: no rash  Psych: no depression  Endocrine: no polyuria, polydipsia  Heme/lymph: no swelling    VITALS: T(C): 37.2 (10-30-24 @ 12:00)  T(F): 98.9 (10-30-24 @ 12:00), Max: 98.9 (10-30-24 @ 12:00)  HR: 89 (10-30-24 @ 12:00) (70 - 100)  BP: 107/50 (10-30-24 @ 12:00) (101/43 - 126/53)  RR:  (18 - 18)  SpO2:  (99% - 100%)  Wt(kg): --  General: trach    Ophthalmology Exam:  Visual acuity (sc): FARAZ 2/2 trach and participation  Pupils: PERRL OU, no APD  Ttono: 22 OD, 18 OS  Extraocular movements (EOMs): tr abduction deficit, symmetric, no pain, no diplopia  Confrontational Visual Field (CVF): FARAZ 2/2 trach and participation  Color Plates: FARAZ 2/2 trach and participation    Pen Light Exam (PLE)  External: Flat OU  Lids/Lashes/Lacrimal Ducts: chalazion RUL OD ; Flat OS  Sclera/Conjunctiva: scleromalacia perforans OU  Cornea: OD: 3 + SPK ; OS: 4+ SPK   Anterior Chamber: D+F OU    Iris: Flat OU  Lens: cataract OS>OD    Fundus Exam: dilated with 1% tropicamide and 2.5% phenylephrine  Approval obtained from primary team for dilation  Patient aware that pupils can remained dilated for at least 4-6 hours  Exam performed with 20D lens    Vitreous: wnl OU  Disc, cup/disc: sharp and pink, 0.4 OU  Macula: flat OU  Vessels: wnl OU  Periphery: wnl OU    Lens: PCIOL OD; OS: dense cataract OS     Fundus Exam: dilated with 1% tropicamide and 2.5% phenylephrine  Approval obtained from primary team for dilation  Patient aware that pupils can remained dilated for at least 4-6 hours  Exam performed with 20D lens    Vitreous: wnl OU  Disc, cup/disc: sharp and pink, 0.4 OU  Macula: OD: few DBH OD ; OS: few DBH   Vessels: wnl OU  Periphery: wnl OU [facility] DEPARTMENT OF OPHTHALMOLOGY - INITIAL ADULT CONSULT  ----------------------------------------------------------------------------------------------------  Alber Cobian MD PGY-3  Available on teams  ----------------------------------------------------------------------------------------------------    HPI:  73 yo F PMH T2DM on insulin, HTN, HLD, hypothyroidism, RA on Prednisone, Fibromyalgia, cerebral aneurysm s/p repair, chronic hypercapnic respiratory failure s/p trach (vent dependent) and chronic PEG 2022, recurrent C. diff infection (follows with Dr. Newman), persistent GJ tube leaks now s/p GC fistula repair 8/12 BIBEMS with daughter for respiratory distress, hypoxia to high 80s.  Pt also noted to have rectal bleeding this past week requiring transfusion 5 days ago in ED as per daughter.  In ED, pt afebrile, fiO2 96-98% on 40% on ventilator.  Chest Xray w/ opacification of right lung concerning for possible PNA.  Admitted to RCU for further management.      (07 Oct 2024 18:58)    Interval History: Consulted bc about pt's need for pred forte gtt. Pt has been on it since April 2024 when last seen by ophthalmology.    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 650 milliGRAM(s) Enteral Tube every 8 hours  albuterol/ipratropium for Nebulization 3 milliLiter(s) Nebulizer every 6 hours  artificial  tears Solution 1 Drop(s) Both EYES every 6 hours  ascorbic acid 500 milliGRAM(s) Oral daily  Biotene Dry Mouth Oral Rinse 5 milliLiter(s) Swish and Spit every 6 hours  calcium carbonate 1250 mG  + Vitamin D (OsCal 500 + D) 1 Tablet(s) Oral <User Schedule>  chlorhexidine 0.12% Liquid 15 milliLiter(s) Oral Mucosa every 12 hours  chlorhexidine 4% Liquid 1 Application(s) Topical daily  dextrose 5%. 1000 milliLiter(s) (100 mL/Hr) IV Continuous <Continuous>  dextrose 5%. 1000 milliLiter(s) (50 mL/Hr) IV Continuous <Continuous>  dextrose 50% Injectable 25 Gram(s) IV Push once  dextrose 50% Injectable 12.5 Gram(s) IV Push once  diclofenac sodium 1% Gel 2 Gram(s) Topical every 6 hours  escitalopram 10 milliGRAM(s) Oral <User Schedule>  fentaNYL   Patch  12 MICROgram(s)/Hr 1 Patch Transdermal every 72 hours  fentaNYL   Patch  25 MICROgram(s)/Hr 1 Patch Transdermal every 72 hours  gabapentin Solution 250 milliGRAM(s) Oral <User Schedule>  glucagon  Injectable 1 milliGRAM(s) IntraMuscular once  hemorrhoidal Ointment 1 Application(s) Rectal at bedtime  influenza  Vaccine (HIGH DOSE) 0.5 milliLiter(s) IntraMuscular once  insulin glargine Injectable (LANTUS) 18 Unit(s) SubCutaneous <User Schedule>  insulin lispro (ADMELOG) corrective regimen sliding scale   SubCutaneous every 6 hours  insulin lispro Injectable (ADMELOG) 18 Unit(s) SubCutaneous <User Schedule>  insulin lispro Injectable (ADMELOG) 28 Unit(s) SubCutaneous <User Schedule>  insulin lispro Injectable (ADMELOG) 13 Unit(s) SubCutaneous <User Schedule>  lactobacillus acidophilus 1 Tablet(s) Oral <User Schedule>  levothyroxine 125 MICROGram(s) Oral <User Schedule>  lidocaine   4% Patch 4 Patch Transdermal every 24 hours  loperamide Liquid 2 milliGRAM(s) Enteral Tube every 12 hours  melatonin 12 milliGRAM(s) Oral <User Schedule>  nystatin Powder 1 Application(s) Topical every 8 hours  pantoprazole  Injectable 40 milliGRAM(s) IV Push every 12 hours  predniSONE  Solution 5 milliGRAM(s) Enteral Tube <User Schedule>  QUEtiapine 12.5 milliGRAM(s) Oral <User Schedule>  sodium chloride 1 Gram(s) Oral every 12 hours  sodium chloride 0.65% Nasal 1 Spray(s) Both Nostrils every 6 hours  witch hazel Pads 1 Application(s) Topical every 12 hours    MEDICATIONS  (PRN):  aluminum hydroxide/magnesium hydroxide/simethicone Suspension 30 milliLiter(s) Enteral Tube every 4 hours PRN Dyspepsia  HYDROmorphone  Injectable 0.2 milliGRAM(s) IV Push once PRN Severe Pain (7 - 10)    Allergies & Intolerances:   walnut (Unknown)  metronidazole (Rash)  Lyrica (Unknown)  penicillin (Unknown)  Pineapple (Unknown)  Tagamet (Unknown)  heparin (Unknown)  Pecans (Unknown)  Hazelnut (Unknown)  meropenem (Rash)    Review of Systems:  Constitutional: No fever, chills  Eyes: No blurry vision, flashes, floaters, FBS, erythema, discharge, double vision, OU  Neuro: No tremors  Cardiovascular: No chest pain, palpitations  Respiratory: No SOB, no cough  GI: No nausea, vomiting, abdominal pain  : No dysuria  Skin: no rash  Psych: no depression  Endocrine: no polyuria, polydipsia  Heme/lymph: no swelling    VITALS: T(C): 37.2 (10-30-24 @ 12:00)  T(F): 98.9 (10-30-24 @ 12:00), Max: 98.9 (10-30-24 @ 12:00)  HR: 89 (10-30-24 @ 12:00) (70 - 100)  BP: 107/50 (10-30-24 @ 12:00) (101/43 - 126/53)  RR:  (18 - 18)  SpO2:  (99% - 100%)  Wt(kg): --  General: trach    Ophthalmology Exam:  Visual acuity (sc): FARAZ 2/2 trach and participation  Pupils: PERRL OU, no APD  Ttono: 22 OD, 18 OS  Extraocular movements (EOMs): tr abduction deficit, symmetric, no pain, no diplopia  Confrontational Visual Field (CVF): FARAZ 2/2 trach and participation  Color Plates: FARAZ 2/2 trach and participation    Pen Light Exam (PLE)  External: Flat OU  Lids/Lashes/Lacrimal Ducts: Flat OU  Sclera/Conjunctiva: scleromalacia perforans OU  Cornea: 1-2+ SPK OU  Anterior Chamber: D+F OU    Iris: Flat OU  Lens: PCIOL OD; dense cataract OS     Fundus Exam: dilated with 1% tropicamide and 2.5% phenylephrine  Approval obtained from primary team for dilation  Patient aware that pupils can remained dilated for at least 4-6 hours  Exam performed with 20D lens    Vitreous: wnl OU  Disc, cup/disc: sharp and pink, 0.4 OU  Macula: flat OU  Vessels: wnl OU  Periphery: wnl OU

## 2024-10-30 NOTE — PROGRESS NOTE ADULT - ASSESSMENT
73 yo F PMH T2DM on insulin, HTN, HLD, hypothyroidism, RA on Prednisone, Fibromyalgia, cerebral aneurysm s/p repair, chronic hypercapnic respiratory failure s/p trach (vent dependent) and chronic PEG 2022, recurrent C. diff infection (follows with Dr. 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  Pt also noted to have rectal bleeding this past week requiring transfusion 5 days ago in ED as per daughter. Daughter also reports brownish discharge from G-J tube. In ED, pt afebrile, fiO2 96-98% on 40% on ventilator.  Chest X-ray w/ opacification of right lung concerning for possible PNA.  Admitted to RCU for further management. Sputum cxs grew PSA; treated with course of Cefepime. Patient with decreased H+H received 1 unit of PRBCS on 10/10.  10/14 EGD w/ Dr. Rosales for PEGJ exchange and clippings placed for closure of fistula site.  Persistent fevers treated with meropenem and vanc (d/c'd 10/20).  CT A/P without infectious source.  Continue airway management with duonebs, chest PT and suction PRN.        10/30: Patient given 1 unit of PRBCs today hgb 6.8. Dr. Myah Coleman called for consult. Patient seen by Optho recommended discontinuation of prednisolone gtts and initiating artificial tears. RT Dislodged yesterday remains with frequent stooling but not diarrhea. Will resume Imodium 2mg q 12hrs. Continue to monitor Gastric output improved today.

## 2024-10-30 NOTE — PROGRESS NOTE ADULT - PROBLEM SELECTOR PLAN 6
- Continue home prednisone 5 mg daily  - Pt receives Enbrel injections q Thursday (will hold in setting of recent infection)

## 2024-10-30 NOTE — PROGRESS NOTE ADULT - PROBLEM SELECTOR PLAN 3
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  No focal intra-abdominal/pelvic or subcutaneous collections.  - Occult 10/7: Positive  - Transfused on 10/10 with appropriate response in H+H  - Will transfuse additional unit today; Check AM CBC   - Heme called for consult

## 2024-10-30 NOTE — PROGRESS NOTE ADULT - NS ATTEND AMEND GEN_ALL_CORE FT
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 Found to have Pseudomonas aeruginosa in sputum culture and urine culture with ESBL E. coli s/p course of meropenem. Course complicated by antibiotic-associated diarrhea.    Was having high stool output, now improved. But with increased gastric output. Abn Xray WNL.   Not pressure supporting well  Being monitored off abx.   Low h/h pending repeat. s/p 1 unit prbc    # Neuro: awake and alert, but with critical illness polyneuropathy. Outpatient f/up for hearing loss. Continue escitalopram and quetiapine. Fentanyl patch for pain along with diclofenac gel prn. Continue gabapentin. Oxycodone prn  # CV: HD stable. Hold off on diuresis today given significant diarrhea. Improved volume overload. possibly due to low albumin state.  # Pulm: continue lung protective ventilation. Pressure support trials as tolerates. Does poorly on PS trials. Continue Duonebs.   # GI: s/p G-J tube exchange by GI on 10/21. Keep G port for decompression of G-C fistula and J tube for meds and feeds. Tolerating feeds. Continued diarrhea now improved. No longer requiring rectal tube.. GI PCR and C diff negative. Continue banana flakes and lactobacillus. Change to loperamide BID.  # Renal: stable kidney function and lytes, strict I/O's  # ID: observe off antibiotics. Appreciate ID follow-up. CT A/P unremarkable. Will need repeat CT chest in 6 weeks to assess for resolution. Wound care follow-up for sacral wound. F/U with further ID recs.   # Heme: SCD's for DVT ppx, Hold lovenox for low H/H. s/p PRBC today. No signs of active bleeding hx of LALITO, hemorrhoids.   # Endo: DM2, continue insulin coverage scale + basal insulin  # Rheum: RA, on home prednisone 5 mg daily. Hold off on Enbrel at this time  # Dispo: full code, prognosis guarded, discussed with patient and daughter tong Gregg/mike planning.

## 2024-10-30 NOTE — PROGRESS NOTE ADULT - PROBLEM SELECTOR PLAN 1
- Patient with Chronic Trach at baseline   - mechanical vent: volume ctrl 18/350/5/40%  - CXR 10/7: Opacification of the right middle lobe may represent pneumonia versus atelectasis  - received Rocephin and Azithro in the ED   - sputum cx 10/8: PSA  - completed course of Cefepime ended on 10/12  - ID continues to follow

## 2024-10-30 NOTE — PROGRESS NOTE ADULT - PROBLEM SELECTOR PLAN 9
- Patient has known hx of prior G-Tube stoma leak   - S/p EGD for closure of Gastric Fistula (8/12) w/ Total of 3 Mantis clips and two 22 mm Microtech clips by GI Dr Rosales   - Old stoma peg site with mild clear drainage s/p repair of fistula  - 10/14 G-J exchanged by GI and clips applied to fistula site  - 10/17 PEGJ clogged  - 10/21 PEGJ revision done, tolerating enteral feeds  - Both feeds and medications are now give through feeding/J-port  - G-port is placed to continuous bag drainage for decompression  - Continue Maalox TID for gaseous distention

## 2024-10-30 NOTE — PROGRESS NOTE ADULT - PROBLEM SELECTOR PLAN 2
[] PNA  - CXR 10/7: Opacification of the right middle lobe may represent pneumonia versus atelectasis  - sputum culture 10/8 PSA  - completed course of cefepime on 10/12  - CT A/P 10/18: ?multifocal pneumonia, presence of gastrocutaneous fistula; otherwise no collections/abscess noted  - Txd with meropenem 10/15-10/23; d/c'd for persistent diarrhea  - Currently remains afebrile off abx

## 2024-10-30 NOTE — PROGRESS NOTE ADULT - PROBLEM SELECTOR PLAN 7
- Continue Standing/ Breakthrough Tylenol   - Continue Gabapentin / PRN Dilaudid   - Continue Fentanyl patches ( Renew 11/4) - Continue Standing/ Breakthrough Tylenol   - Continue Gabapentin / PRN OXY  - Continue Fentanyl patches ( Renew 11/4)

## 2024-10-30 NOTE — CONSULT NOTE ADULT - ASSESSMENT
74y female with a past medical history/ocular history of T2DM on insulin, HTN, HLD, hypothyroidism, RA on Prednisone, Fibromyalgia, cerebral aneurysm s/p repair, acute hypercapnic respiratory failure s/p trach (vent dependent) and PEG (10/22), prior anterior scleritis, bedbound consulted for management of eye drops.        Seen and discussed with ***.    Outpatient Follow-up: Patient should follow-up with his/her ophthalmologist or with [facility] [place] Department of Ophthalmology within 1 week of after discharge at:    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 Marcus.    Outpatient Follow-up: Patient should follow-up with his/her ophthalmologist or with [facility] Department of Ophthalmology within 1 week of after discharge at:    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 Marcus.    Outpatient Follow-up: Patient should follow-up with his/her ophthalmologist or with [facility] Department of Ophthalmology within 1 week of after discharge at:    [address]  [place] [address]  [phone]    Alber Cobian MD, PGY-3  Also available on [employer] Teams

## 2024-10-31 NOTE — CHART NOTE - NSCHARTNOTEFT_GEN_A_CORE
NUTRITION FOLLOW UP NOTE    PATIENT SEEN FOR: follow up     SOURCE: [] Patient  [x] Current Medical Record  [x] Nursing  [x] Family/support person at bedside  [x] Patient unavailable/inappropriate  [] Other:     CHART REVIEWED/EVENTS NOTED.  [] No changes to nutrition care plan to note  [x] Nutrition Status:  -PMH T2DM  -s/p trach (vent dependent) and chronic PEG 2022  -s/p G-J tube exchange by GI on 10/21. J tube for meds and feeds per team    Diet, NPO with Tube Feed:   Tube Feeding Modality: Jejunostomy  "[employer] Peptide 1.5 (KFPEPT1.5RTH)  Total Volume for 24 Hours (mL): 960  Continuous  Until Goal Tube Feed Rate (mL per Hour): 80  Tube Feed Duration (in Hours): 12  Tube Feed Start Time: 06:00  Free Water Flush  Pump   Rate (mL per Hour): 50   Frequency: Every Hour  Free Water Flush Instructions:  50ml free water flushes Q1hr  Alfred(7 Gm Arginine/7 Gm Glut/1.2 Gm HMB     Qty per Day:  2  No Carb Prosource (1pkg = 15gms Protein)     Qty per Day:  1  Banatrol TF     Qty per Day:  1/2 packet per daily (10-26-24 @ 15:36) [Active]    CURRENT DIET ORDER IS:  [] Appropriate:  [] Inadequate:  [x] Other: see below for recommendations     NUTRITION INTAKE/PROVISION:  [] PO:  [x] Enteral Nutrition: [employer] Peptide 1.5 + Alfred BID + No carb Prosource daily + 1/2 packet Banatrol daily providing at 100% provision: 1768kcal, 92g protein and 672ml free water.   -->10/24/24: Daughter continuing to request to continue 80ml/hr of tube feeding be provided x 12 hours.  --> Provider to RN order placed to ensure full tube feeding order of 960ml is provided to the patient.   [] Parenteral Nutrition:    ANTHROPOMETRICS:  Drug Dosing Weight  Height (cm): 149.9 (21 Oct 2024 18:49)  Weight (kg): 54.4 (21 Oct 2024 18:49)  BMI (kg/m2): 24.2 (21 Oct 2024 18:49)  BSA (m2): 1.48 (21 Oct 2024 18:49)  10/16 62.5kg - edema noted  RD will continue to monitor trends.     MEDICATIONS  (STANDING):  ascorbic acid  calcium carbonate 1250 mG  + Vitamin D (OsCal 500 + D)  dextrose 5%.  dextrose 5%.  dextrose 50% Injectable  dextrose 50% Injectable  glucagon  Injectable  insulin glargine Injectable (LANTUS)  insulin lispro (ADMELOG) corrective regimen sliding scale  insulin lispro Injectable (ADMELOG)  insulin lispro Injectable (ADMELOG)  insulin lispro Injectable (ADMELOG)  levothyroxine  loperamide Liquid  pantoprazole  Injectable  predniSONE  Solution  sodium chloride    Pertinent Labs:   A1C with Estimated Average Glucose Result: 5.8 % (10-14-24 @ 05:08)  A1C with Estimated Average Glucose Result: 6.4 % (08-12-24 @ 07:32)    Finger Sticks:  POCT Blood Glucose.: 120 mg/dL (10-31 @ 06:14)  POCT Blood Glucose.: 79 mg/dL (10-31 @ 05:12)  POCT Blood Glucose.: 69 mg/dL (10-31 @ 05:11)  POCT Blood Glucose.: 147 mg/dL (10-31 @ 00:54)  POCT Blood Glucose.: 62 mg/dL (10-31 @ 00:06)  POCT Blood Glucose.: 63 mg/dL (10-31 @ 00:00)  POCT Blood Glucose.: 210 mg/dL (10-30 @ 17:33)  POCT Blood Glucose.: 188 mg/dL (10-30 @ 12:01)    NUTRITIONALLY PERTINENT MEDICATIONS/LABS:  [x] Reviewed  [x] Relevant notes on medications/labs:  -iron low (10/30/24)   -intermittent episodes of hypernatremia and hyponatremia noted. remains on 12 hour feeding (06:00 until 18:00 or until full volume completed) and endocrinology following. Insulin regimen deferred to endocrine/team.     EDEMA:  [x] Reviewed  [] Relevant notes:    GI/ I&O:  [x] Reviewed  [] Relevant notes:  [x] Other: Was having high stool output, now improving per team. rectal tube discontinued 10/28.     SKIN:   sacrum ruth buttocks stage IV per flow sheets    ESTIMATED NEEDS:  Based on: dosing weight 54.4kg   30-35kcal/kg 1632-1904kcal/day  1.4-1.8g/kg 76-98g protein/day  defer fluid needs to team     NUTRITION DIAGNOSIS:  [x] Prior Dx: increased nutrient needs  [] New Dx:    EDUCATION:  [] Yes:  [x] Not appropriate/warranted  10/15/24 Nutrition Chart Note: Spoke with pt daughter Marysol Spann over phone ([phone]) to confirm pt home tube feeding regimen. Per daughter, pt receiving [employer] Peptide 1.5, 3 bottles/day. 80ml/hr x 12 hours.     NUTRITION CARE PLAN:  1. Diet: Update EN regimen to better align with pt nutritional needs. Consider [employer] Peptide 80ml/hr x 12 hours + Prosource TF Free daily + Alfred BID + Banatrol Daily. This will provide a total volume of 960ml total volume, 1788kcal (33kcal/kg) and 92g protein (1.7g/kg)  -->Defer free water flush to team   2. Continue vitamin/mineral regimen as ordered per team     [] Achieved - Continue current nutrition intervention(s)  [] Current medical condition precludes nutrition intervention at this time.    MONITORING AND EVALUATION:   RD remains available upon request and will follow up per protocol.    Rufina Morris, MS, RD, CDN / Teams NUTRITION FOLLOW UP NOTE    PATIENT SEEN FOR: follow up     SOURCE: [] Patient  [x] Current Medical Record  [x] Nursing  [x] Family/support person at bedside  [x] Patient unavailable/inappropriate  [] Other:     CHART REVIEWED/EVENTS NOTED.  [] No changes to nutrition care plan to note  [x] Nutrition Status:  -PMH T2DM  -s/p trach (vent dependent) and chronic PEG 2022  -s/p G-J tube exchange by GI on 10/21. J tube for meds and feeds per team    Diet, NPO with Tube Feed:   Tube Feeding Modality: Jejunostomy  [employer] Peptide 1.5 (KFPEPT1.5RTH)  Total Volume for 24 Hours (mL): 960  Continuous  Until Goal Tube Feed Rate (mL per Hour): 80  Tube Feed Duration (in Hours): 12  Tube Feed Start Time: 06:00  Free Water Flush  Pump   Rate (mL per Hour): 50   Frequency: Every Hour  Free Water Flush Instructions:  50ml free water flushes Q1hr  Alfred(7 Gm Arginine/7 Gm Glut/1.2 Gm HMB     Qty per Day:  2  No Carb Prosource (1pkg = 15gms Protein)     Qty per Day:  1  Banatrol TF     Qty per Day:  1/2 packet per daily (10-26-24 @ 15:36) [Active]    CURRENT DIET ORDER IS:  [] Appropriate:  [] Inadequate:  [x] Other: see below for recommendations     NUTRITION INTAKE/PROVISION:  [] PO:  [x] Enteral Nutrition: [employer] Peptide 1.5 + Alfred BID + No carb Prosource daily + 1/2 packet Banatrol daily providing at 100% provision: 1768kcal (32.5kcal/kg), 92g protein (1.7g/kg) and 672ml free water.   -->10/24/24: Daughter continuing to request to continue 80ml/hr of tube feeding be provided x 12 hours.  --> Provider to RN order placed to ensure full tube feeding order of 960ml is provided to the patient.   [] Parenteral Nutrition:    ANTHROPOMETRICS:  Drug Dosing Weight  Height (cm): 149.9 (21 Oct 2024 18:49)  Weight (kg): 54.4 (21 Oct 2024 18:49)  BMI (kg/m2): 24.2 (21 Oct 2024 18:49)  BSA (m2): 1.48 (21 Oct 2024 18:49)  10/16 62.5kg - edema noted  RD will continue to monitor trends.     MEDICATIONS  (STANDING):  ascorbic acid  calcium carbonate 1250 mG  + Vitamin D (OsCal 500 + D)  dextrose 5%.  dextrose 5%.  dextrose 50% Injectable  dextrose 50% Injectable  glucagon  Injectable  insulin glargine Injectable (LANTUS)  insulin lispro (ADMELOG) corrective regimen sliding scale  insulin lispro Injectable (ADMELOG)  insulin lispro Injectable (ADMELOG)  insulin lispro Injectable (ADMELOG)  levothyroxine  loperamide Liquid  pantoprazole  Injectable  predniSONE  Solution  sodium chloride    Pertinent Labs:   A1C with Estimated Average Glucose Result: 5.8 % (10-14-24 @ 05:08)  A1C with Estimated Average Glucose Result: 6.4 % (08-12-24 @ 07:32)    Finger Sticks:  POCT Blood Glucose.: 120 mg/dL (10-31 @ 06:14)  POCT Blood Glucose.: 79 mg/dL (10-31 @ 05:12)  POCT Blood Glucose.: 69 mg/dL (10-31 @ 05:11)  POCT Blood Glucose.: 147 mg/dL (10-31 @ 00:54)  POCT Blood Glucose.: 62 mg/dL (10-31 @ 00:06)  POCT Blood Glucose.: 63 mg/dL (10-31 @ 00:00)  POCT Blood Glucose.: 210 mg/dL (10-30 @ 17:33)  POCT Blood Glucose.: 188 mg/dL (10-30 @ 12:01)    NUTRITIONALLY PERTINENT MEDICATIONS/LABS:  [x] Reviewed  [x] Relevant notes on medications/labs:  -iron low (10/30/24)   -intermittent episodes of hyperglycemia and hypoglycemia noted. remains on 12 hour feeding (06:00 until 18:00 or until full volume completed) and endocrinology following. Insulin regimen deferred to endocrine/team.     EDEMA:  [x] Reviewed  [x] Relevant notes:  per nursing flow sheets:  3+ ruth hand ruth foot  2+ ruth arm  1+ generalized     GI/ I&O:  [x] Reviewed  [] Relevant notes:  [x] Other: Was having high stool output, now improving per team. rectal tube discontinued 10/28. Imodium ordered.     SKIN:   sacrum stage IV per flow sheets    ESTIMATED NEEDS:  Based on: dosing weight 54.4kg   30-35kcal/kg 1632-1904kcal/day  1.4-1.8g/kg 76-98g protein/day  defer fluid needs to team     NUTRITION DIAGNOSIS:  [x] Prior Dx: increased nutrient needs  [] New Dx:    EDUCATION:  [] Yes:  [x] Not appropriate/warranted  10/15/24 Nutrition Chart Note: Spoke with pt daughter Marysol Spann over phone ([phone]) to confirm pt home tube feeding regimen. Per daughter, pt receiving [employer] Peptide 1.5, 3 bottles/day. 80ml/hr x 12 hours.     NUTRITION CARE PLAN:  1. Diet: Can continue [employer] Peptide 1.5 for a total volume of 960ml. Rate/hr deferred to team. Can continue banatrol to aid in stool bulking, prosource to aid in added protein/calories for wound healing and Alfred to aid in wound healing.   -->Insulin regimen deferred to team/endocrinology   -->Defer free water flush to team   -->Do not mix prosource and banatrol together.   -->Alfred to be mixed with a minimum of 4ox of water per [employer]   2. Continue vitamin/mineral regimen as ordered per team   3. Consider iron supplement if no contraindications    4. Consider obtaining updated weight     [] Achieved - Continue current nutrition intervention(s)  [] Current medical condition precludes nutrition intervention at this time.    MONITORING AND EVALUATION:   RD remains available upon request and will follow up per protocol.    Rufina Morris, MS, RD, CDN / Teams

## 2024-10-31 NOTE — PROGRESS NOTE ADULT - ASSESSMENT
73 yo woman PMH T2DM on insulin, HTN, HLD, hypothyroidism, RA on Prednisone, Fibromyalgia, cerebral aneurysm s/p repair, chronic hypercapnic respiratory failure s/p trach (vent dependent) and chronic PEG 2022, recurrent C. diff infection , persistent GJ tube leaks now s/p GC fistula repair 8/12  admitted 10/7/24 with resp distress and found to have RML opacity     Antibiotics  Ceftriaxone 10/7  Azithro 10/7  Cefepime 10/7--> 10/12  meropenem 10/15 --> 10/23  IV Vanco 10/17 --> 10/21  Vanco via NGT 10/24      10/10 melena, anemia, blood tx  10/14 EGD for GJ exchange and piror PEG site closure  One benign-appearing, intrinsic moderate stenosis was found in the upper third of the        esophagus. The stenosis was traversed.       The cardia and gastric fundus were normal.       A gastric tube with tip in the jejunum was found in the gastric body. The PEG required        removal because it was leaking. The J tube extension (12F) was removed first. An externally        removable 24 Fr EndoVive Safety gastrostomy tube was lubricated. The guide wire was passed        through the existing G-tube port and snared endoscopically. The endoscope and snare were then        removed, pulling the wire out through the mouth. The g-tube was tied to the guidewire, pulled        through the mouth into the stomach and then pulled out from the stomach through the skin. The        bumper was attached to the gastrostomy tube. The feeding tube was then cut to an appropriate        length. The final position of the gastrostomy tube was confirmed by relook endoscopy, and        skin marking noted to be 3 cm at the external bumper. The final tension and compression of        the abdominal wall by the PEG tube and external bumper were checked and revealed that the        bumper was moderately tight and mildly deforming the skin. The J tube extension (12 F        EndoVive Jejunal feeding tube) was advanced into the stomach. The tip of the J tube had a        loop thread that was captured with a Resolution clip. The thread and the J tube extension        were advanced to the proximal jejunum, and the endoclip along with the tip of the J tube was        attached to the wall securely. The J tube extension was capped, and the tube site was cleaned        and dressed.       A small fistula was found on the anterior wall of the gastric body related to prior G tube        site. Coagulation of tissue near the fistula using argon plasma at 1.2 liters/minute and 35        peraza was successful. To closure the gastrocutaneous fistula, four hemostatic clips were        successfully placed (MR conditional, two 16 mm DuraClip and 2 Mantis clips.       The examined duodenum was normal.    10/14, 10/15 Fever, transient hypoxia post procedure  10/15 ProCalcitonin = 8.48 suggestive of bacterial process; BCx x2 NGTD; Trach: Pseudomonas   - though benign mg stain  10;16 Leukocytosis WBC = 14.26  10/17 fever, hypotension, abd distension, no diarrhea noted this am WBC = 15.02; Rising Procalcitonin = 38.49; Obstructed J tube   10/18  lost IV access re-gained  - CT scan late this afternoon  WBC = 8.68; Rising Procalcitonin >100  10/18 imaging suggests multifocal pneumonia  10/21 improved chest exam, Procalcitonin level considerably decreased, decreased FiO2  - afebrile since 10/18  10/21 UGI endoscopy done  Findings:       The esophagus was normal. A fistula was found on the anterior wall of the gastric body.       There was evidence of a gastrostomy present in the gastric body. The patient was placed in        the supine position. The endoscope was advanced to the jejunum and the prior placed J tube        with loop attached to the wall and the loop thread was cut by endoclip. The J tube and the G        tube were removed. The gastrostomy fistula was utilized and an [employer] 22 F        Gastrostomy/Jejunal tube was advanced. The jejunal tip was advanced through the pylorus and        into the duodenum. The endoscope was then advanced to the duodenum and the loop thread at the        tip of the J tube was captured and advanced to the proximal jejunum. The loop thread was        clipped to wall by a [employer] Resolution clip. The J tube flushed easily and the G        tube decompress the abdomen easily. The internal balloon was insufflated with 10 cc of water        to hold the GJ tube in place. The outside marking was at 3.  10/23  no distress, liquid stools noted  - Meropenem discontinued  GI PCR NEG  10/24  persistent diarrhea  Cdiff NEG; enteral vanco stopped and Immodium provided  10/28 low grade temps, mild leukocytosis  10/20 blood transfusion      Issues  cerebral aneurysm s/p repair  chronic hypercapnic respiratory failure s/p trach (vent dependent) and chronic PEG 2022  anemia  - had iron deficiency  Pseudomonas aeruginosa upper airway colonization  (most recent isolate Resist to Cipro/Levo)  previous Cdiff  T2DM on insulin  HTN  HLD  hypothyroidism  RA on Prednisone  Fibromyalgia  debility  sacral ulcer largely healed  malnourished/chronic catobolic state      Suggest  monitor off antibiotics  if develops profuse watery diarrhea, would repeat C diff    discussed with [facility] team

## 2024-10-31 NOTE — PROGRESS NOTE ADULT - SUBJECTIVE AND OBJECTIVE BOX
Seen earlier today     Chief Complaint: Diabetes Mellitus follow up    INTERVAL HX: Tolerating 12 hour tube feeding well ( [employer] Peptide 1.5 (KFPEPT1.5RTH)at 80ml/hr for 12 hours total volume 960ml)). Noted near hypoglycemia this am ( BG 69 and repeat 79) and on standing Admelog q 6 hours X3 at 06:00, 12:00, and 18:00 while on 12 hour tube feeding. Staffs providing hygiene care during this visit.       Review of Systems:  General: As above  GI: No nausea, vomiting  Endocrine: no  S&Sx of hypoglycemia    Allergies    walnut (Unknown)  metronidazole (Rash)  Lyrica (Unknown)  penicillin (Unknown)  Pineapple (Unknown)  Tagamet (Unknown)  heparin (Unknown)  Pecans (Unknown)  Hazelnut (Unknown)  meropenem (Rash)    Intolerances      MEDICATIONS  (STANDING):  acetaminophen   Oral Liquid .. 650 milliGRAM(s) Enteral Tube every 8 hours  albuterol/ipratropium for Nebulization 3 milliLiter(s) Nebulizer every 6 hours  artificial  tears Solution 1 Drop(s) Both EYES every 6 hours  artificial tears (preservative free) Ophthalmic Solution 1 Drop(s) Both EYES four times a day  ascorbic acid 500 milliGRAM(s) Oral daily  Biotene Dry Mouth Oral Rinse 5 milliLiter(s) Swish and Spit every 6 hours  calcium carbonate 1250 mG  + Vitamin D (OsCal 500 + D) 1 Tablet(s) Oral <User Schedule>  chlorhexidine 0.12% Liquid 15 milliLiter(s) Oral Mucosa every 12 hours  chlorhexidine 4% Liquid 1 Application(s) Topical daily  dextrose 5%. 1000 milliLiter(s) (50 mL/Hr) IV Continuous <Continuous>  dextrose 5%. 1000 milliLiter(s) (100 mL/Hr) IV Continuous <Continuous>  dextrose 50% Injectable 25 milliLiter(s) IV Push once  dextrose 50% Injectable 25 Gram(s) IV Push once  dextrose 50% Injectable 12.5 Gram(s) IV Push once  diclofenac sodium 1% Gel 2 Gram(s) Topical every 6 hours  escitalopram 10 milliGRAM(s) Oral <User Schedule>  fentaNYL   Patch  12 MICROgram(s)/Hr 1 Patch Transdermal every 72 hours  fentaNYL   Patch  25 MICROgram(s)/Hr 1 Patch Transdermal every 72 hours  gabapentin Solution 250 milliGRAM(s) Oral <User Schedule>  glucagon  Injectable 1 milliGRAM(s) IntraMuscular once  hemorrhoidal Ointment 1 Application(s) Rectal at bedtime  influenza  Vaccine (HIGH DOSE) 0.5 milliLiter(s) IntraMuscular once  insulin lispro (ADMELOG) corrective regimen sliding scale   SubCutaneous every 6 hours  insulin lispro Injectable (ADMELOG) 18 Unit(s) SubCutaneous <User Schedule>  insulin lispro Injectable (ADMELOG) 30 Unit(s) SubCutaneous <User Schedule>  lactobacillus acidophilus 1 Tablet(s) Oral <User Schedule>  levothyroxine 125 MICROGram(s) Oral <User Schedule>  lidocaine   4% Patch 4 Patch Transdermal every 24 hours  loperamide Liquid 2 milliGRAM(s) Enteral Tube every 12 hours  melatonin 12 milliGRAM(s) Oral <User Schedule>  nystatin Powder 1 Application(s) Topical every 8 hours  pantoprazole  Injectable 40 milliGRAM(s) IV Push every 12 hours  predniSONE  Solution 5 milliGRAM(s) Enteral Tube <User Schedule>  QUEtiapine 12.5 milliGRAM(s) Oral <User Schedule>  sodium bicarbonate  Injectable 50 milliEquivalent(s) IV Push once  sodium chloride 1 Gram(s) Oral every 12 hours  sodium chloride 0.65% Nasal 1 Spray(s) Both Nostrils every 6 hours  Viokace Flush 1 Tablet(s) 1 Tablet(s) Enteral Tube once  witch hazel Pads 1 Application(s) Topical every 12 hours        dextrose 50% Injectable   12.5 Gram(s) IV Push (10-31-24 @ 00:35)    insulin glargine Injectable (LANTUS)   10 Unit(s) SubCutaneous (10-31-24 @ 06:40)    insulin lispro (ADMELOG) corrective regimen sliding scale   1 Unit(s) SubCutaneous (10-31-24 @ 12:31)   2 Unit(s) SubCutaneous (10-30-24 @ 18:21)    insulin lispro Injectable (ADMELOG)   13 Unit(s) SubCutaneous (10-30-24 @ 18:22)    insulin lispro Injectable (ADMELOG)   30 Unit(s) SubCutaneous (10-31-24 @ 12:30)    insulin lispro Injectable (ADMELOG)   18 Unit(s) SubCutaneous (10-31-24 @ 06:39)    levothyroxine   125 MICROGram(s) Oral (10-31-24 @ 04:08)    predniSONE  Solution   5 milliGRAM(s) Enteral Tube (10-31-24 @ 09:26)        PHYSICAL EXAM:  VITALS: T(C): 36.7 (10-31-24 @ 12:26)  T(F): 98.1 (10-31-24 @ 12:26), Max: 98.1 (10-31-24 @ 12:26)  HR: 87 (10-31-24 @ 12:26) (66 - 87)  BP: 143/63 (10-31-24 @ 12:26) (100/67 - 144/62)  RR:  (18 - 18)  SpO2:  (99% - 100%)  Wt(kg): --  GENERAL: Female laying in bed, in NAD  Respiratory: Respirations unlabored   Extremities: Warm, no edema  NEURO: Alert , appropriate     LABS:  POCT Blood Glucose.: 61 mg/dL (10-31-24 @ 15:45)  POCT Blood Glucose.: 102 mg/dL (10-31-24 @ 14:46)  POCT Blood Glucose.: 173 mg/dL (10-31-24 @ 13:33)  POCT Blood Glucose.: 210 mg/dL (10-31-24 @ 12:52)  POCT Blood Glucose.: 176 mg/dL (10-31-24 @ 11:47)  POCT Blood Glucose.: 120 mg/dL (10-31-24 @ 06:14)  POCT Blood Glucose.: 79 mg/dL (10-31-24 @ 05:12)  POCT Blood Glucose.: 69 mg/dL (10-31-24 @ 05:11)  POCT Blood Glucose.: 147 mg/dL (10-31-24 @ 00:54)  POCT Blood Glucose.: 62 mg/dL (10-31-24 @ 00:06)  POCT Blood Glucose.: 63 mg/dL (10-31-24 @ 00:00)  POCT Blood Glucose.: 210 mg/dL (10-30-24 @ 17:33)  POCT Blood Glucose.: 188 mg/dL (10-30-24 @ 12:01)  POCT Blood Glucose.: 121 mg/dL (10-30-24 @ 05:31)  POCT Blood Glucose.: 192 mg/dL (10-29-24 @ 23:16)  POCT Blood Glucose.: 214 mg/dL (10-29-24 @ 17:20)  POCT Blood Glucose.: 216 mg/dL (10-29-24 @ 11:37)  POCT Blood Glucose.: 93 mg/dL (10-29-24 @ 05:48)  POCT Blood Glucose.: 182 mg/dL (10-28-24 @ 23:23)  POCT Blood Glucose.: 386 mg/dL (10-28-24 @ 18:27)                          8.8    11.31 )-----------( 159      ( 31 Oct 2024 08:21 )             29.9     10-31    135  |  102  |  22  ----------------------------<  110[H]  4.0   |  22  |  <0.30[L]    Ca    7.9[L]      31 Oct 2024 08:21  Phos  3.9     10-31  Mg     1.8     10-31    TPro  6.1  /  Alb  2.3[L]  /  TBili  0.3  /  DBili  x   /  AST  25  /  ALT  16  /  AlkPhos  105  10-31    eGFR: 113 mL/min/1.73m2 (31 Oct 2024 08:21)      Thyroid Function Tests:  10-16 @ 06:50 TSH 1.74 FreeT4 1.2 T3 -- Anti TPO -- Anti Thyroglobulin Ab -- TSI --      A1C with Estimated Average Glucose Result: 5.8 % (10-14-24 @ 05:08)  A1C with Estimated Average Glucose Result: 6.4 % (08-12-24 @ 07:32)    Estimated Average Glucose: 120 mg/dL (10-14-24 @ 05:08)  Estimated Average Glucose: 137 mg/dL (08-12-24 @ 07:32)        Diet, NPO with Tube Feed:   Tube Feeding Modality: Jejunostomy  [facility] Peptide 1.5 (KFPEPT1.5RTH)  Total Volume for 24 Hours (mL): 960  Continuous  Until Goal Tube Feed Rate (mL per Hour): 80  Tube Feed Duration (in Hours): 12  Tube Feed Start Time: 06:00  Free Water Flush  Pump   Rate (mL per Hour): 50   Frequency: Every Hour  Free Water Flush Instructions:  50ml free water flushes Q1hr  Alfred(7 Gm Arginine/7 Gm Glut/1.2 Gm HMB     Qty per Day:  2  No Carb Prosource (1pkg = 15gms Protein)     Qty per Day:  1  Banatrol TF     Qty per Day:  1/2 packet per daily (10-26-24 @ 15:36) [Active]

## 2024-10-31 NOTE — PROGRESS NOTE ADULT - PROBLEM SELECTOR PLAN 7
- Continue Standing/ Breakthrough Tylenol   - Continue Gabapentin / PRN OXY  - Continue Fentanyl patches ( Renew 11/4)

## 2024-10-31 NOTE — CONSULT NOTE ADULT - ASSESSMENT
73 yo F PMH T2DM on insulin, HTN, HLD, hypothyroidism, RA on Prednisone, Fibromyalgia, cerebral aneurysm s/p repair, chronic hypercapnic respiratory failure s/p trach (vent dependent) and chronic PEG 2022, recurrent C. diff infection (follows with Dr. Newman), persistent GJ tube leaks now s/p GC fistula repair 8/12 with hypoxia, PNA, with anemia    #anemia, AOCD + phlebotomy  - pt with multiple chronic issues causing AOCD with acute worsening in context of acute illness + phlebotomy  - was fecal occult + on admission but now no overt bleeding  - no renal insufficiency  - s/p PRBC 10/10, 10/30  - iron studies 10/30 with AOCD, no evidence of hemolysis  - WBC/plt stable  - check B12/folate  - continue to monitor Hg  - discussed with daughter that unfortunately no simple treatment for anemia of chronic disease besides transfusions as needed

## 2024-10-31 NOTE — PROGRESS NOTE ADULT - SUBJECTIVE AND OBJECTIVE BOX
Follow Up:  chronic resp failure    Interval History/ROS: rouses,  at bedside    Allergies  walnut (Unknown)  metronidazole (Rash)  Lyrica (Unknown)  penicillin (Unknown)  Pineapple (Unknown)  Tagamet (Unknown)  heparin (Unknown)  Pecans (Unknown)  Hazelnut (Unknown)  meropenem (Rash)    ANTIMICROBIALS:      OTHER MEDS:  MEDICATIONS  (STANDING):  acetaminophen   Oral Liquid .. 650 every 8 hours  albuterol/ipratropium for Nebulization 3 every 6 hours  aluminum hydroxide/magnesium hydroxide/simethicone Suspension 30 every 4 hours PRN  dextrose 50% Injectable 12.5 once  dextrose 50% Injectable 25 once  escitalopram 10 <User Schedule>  fentaNYL   Patch  12 MICROgram(s)/Hr 1 every 72 hours  fentaNYL   Patch  25 MICROgram(s)/Hr 1 every 72 hours  gabapentin Solution 250 <User Schedule>  glucagon  Injectable 1 once  influenza  Vaccine (HIGH DOSE) 0.5 once  insulin lispro (ADMELOG) corrective regimen sliding scale  every 6 hours  insulin lispro Injectable (ADMELOG) 18 <User Schedule>  insulin lispro Injectable (ADMELOG) 30 <User Schedule>  levothyroxine 125 <User Schedule>  loperamide Liquid 2 every 12 hours  melatonin 12 <User Schedule>  oxyCODONE    IR 2.5 every 6 hours PRN  pantoprazole  Injectable 40 every 12 hours  predniSONE  Solution 5 <User Schedule>  QUEtiapine 12.5 <User Schedule>      Vital Signs Last 24 Hrs  T(C): 36.4 (31 Oct 2024 05:27), Max: 36.6 (31 Oct 2024 00:14)  T(F): 97.6 (31 Oct 2024 05:27), Max: 97.9 (31 Oct 2024 00:14)  HR: 81 (31 Oct 2024 11:45) (66 - 81)  BP: 141/64 (31 Oct 2024 05:27) (100/67 - 144/62)  BP(mean): --  RR: 18 (31 Oct 2024 05:27) (18 - 18)  SpO2: 100% (31 Oct 2024 11:45) (99% - 100%)    Parameters below as of 31 Oct 2024 11:45  Patient On (Oxygen Delivery Method): ventilator        PHYSICAL EXAM:  General:  NAD, Non-toxic  Neurology: responsive  Respiratory: trach in placeClear to auscultation bilaterally  CV: RRR, S1S2, no murmurs, rubs or gallops  Abdominal: Soft, Non-tender, non-distended, normal bowel sounds  Extremities: generalized mild edema,  Line Sites: Clear  Skin: No rash                        8.8    11.31 )-----------( 159      ( 31 Oct 2024 08:21 )             29.9       10-31    135  |  102  |  22  ----------------------------<  110[H]  4.0   |  22  |  <0.30[L]    Ca    7.9[L]      31 Oct 2024 08:21  Phos  3.9     10-31  Mg     1.8     10-31    TPro  6.1  /  Alb  2.3[L]  /  TBili  0.3  /  DBili  x   /  AST  25  /  ALT  16  /  AlkPhos  105  10-31        MICROBIOLOGY:  Trach Asp Tracheal Aspirate  10-27-24   Moderate Pseudomonas aeruginosa  Commensal vinay consistent with body site  --    Pseudomonas aeruginosa Resit to Cipro, Levoflox susc to all others tested      .Blood BLOOD  10-27-24   No growth at 72 Hours  --  --      .Blood BLOOD  10-17-24   No growth at 5 days  --  --      .Blood BLOOD  10-17-24   No growth at 5 days  --  --      .Blood BLOOD  10-15-24   No growth at 5 days  --  --      Trach Asp Tracheal Aspirate  10-15-24   Mixed gram negative rods including  Numerous Pseudomonas aeruginosa  Commensal vinay consistent with body site  --  Pseudomonas aeruginosa      .Blood BLOOD  10-15-24   No growth at 5 days  --  --      Trach Asp Tracheal Aspirate  10-08-24   Few Pseudomonas aeruginosa  Commensal vinay consistent with body site  --  Pseudomonas aeruginosa      Clean Catch Clean Catch (Midstream)  10-07-24   10,000 - 49,000 CFU/mL Escherichia coli ESBL  --  Escherichia coli ESBL      .Blood BLOOD  10-07-24   No growth at 5 days  --  --      .Blood BLOOD  10-07-24   No growth at 5 days  --  --      Clean Catch Clean Catch (Midstream)  10-03-24   Culture grew 3 or more types of organisms which indicate  collection contamination; consider recollection only if clinically  indicated.  --  --    Clostridium difficile GD Toxins A&amp;B, EIA:   Negative (10-24-24 @ 16:39)  Clostridium difficile GDH Interpretation: Negative for toxigenic C. Difficile.  This specimen is negative for C.  Difficile glutamate dehydrogenase (GDH) antigen and negative for C.  Difficile Toxins A & B, by EIA.   (10-24-24 @ 16:39)      RADIOLOGY:  < from: Xray Abdomen 1 View PORTABLE -Urgent (Xray Abdomen 1 View PORTABLE -Urgent .) (10.29.24 @ 12:05) >  IMPRESSION:    Nonobstructive bowel gas pattern.    < end of copied text >      Zion Newman MD; Division of Infectious Disease; Pager: [phone]; nights and weekends: [phone]

## 2024-10-31 NOTE — PROGRESS NOTE ADULT - NUTRITIONAL ASSESSMENT
Diet, NPO with Tube Feed:   Tube Feeding Modality: Jejunostomy  [employer] Peptide 1.5 (KFPEPT1.5RTH)  Total Volume for 24 Hours (mL): 960  Continuous  Until Goal Tube Feed Rate (mL per Hour): 80  Tube Feed Duration (in Hours): 12  Tube Feed Start Time: 06:00  Free Water Flush  Pump   Rate (mL per Hour): 50   Frequency: Every Hour  Free Water Flush Instructions:  50ml free water flushes Q1hr  Alfred(7 Gm Arginine/7 Gm Glut/1.2 Gm HMB     Qty per Day:  2  No Carb Prosource (1pkg = 15gms Protein)     Qty per Day:  1  Banatrol TF     Qty per Day:  1/2 packet per daily (10-26-24 @ 15:36) [Active]

## 2024-10-31 NOTE — CONSULT NOTE ADULT - SUBJECTIVE AND OBJECTIVE BOX
Patient is a 74y old  Female who presents with a chief complaint of Patient admitted with Hypoxemia and episode of Bright red blood per rectum (31 Oct 2024 13:53)      HPI:  75 yo F PMH T2DM on insulin, HTN, HLD, hypothyroidism, RA on Prednisone, Fibromyalgia, cerebral aneurysm s/p repair, chronic hypercapnic respiratory failure s/p trach (vent dependent) and chronic PEG 2022, recurrent C. diff infection (follows with Dr. Newman), persistent GJ tube leaks now s/p GC fistula repair 8/12 BIBEMS with daughter for respiratory distress, hypoxia to high 80s.  Pt also noted to have rectal bleeding this past week requiring transfusion 5 days ago in ED as per daughter.  In ED, pt afebrile, fiO2 96-98% on 40% on ventilator.  Chest Xray w/ opacification of right lung concerning for possible PNA.  Admitted to RCU for further management.      (07 Oct 2024 18:58)  Pt with + fecal occult on admission. Pt with PNA s/p course of Cefepime. Required PRBC 10/10, s/p EGD 10/14 for PEGJ exchange and closure of fistula site. Pt had persistent fevers s/p meropenem and vancomycin.   Pt with lower Hg 10/20 and s/p 1 unit PRBC. No overt bleeding.     d/w daughter Marysol on phone- pt has required transfusions during prior hospitalizations also. Did require iron with GIB in 2022, otherwise thought to be AOCD.     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 650 milliGRAM(s) Enteral Tube every 8 hours  albuterol/ipratropium for Nebulization 3 milliLiter(s) Nebulizer every 6 hours  artificial  tears Solution 1 Drop(s) Both EYES every 6 hours  artificial tears (preservative free) Ophthalmic Solution 1 Drop(s) Both EYES four times a day  ascorbic acid 500 milliGRAM(s) Oral daily  Biotene Dry Mouth Oral Rinse 5 milliLiter(s) Swish and Spit every 6 hours  calcium carbonate 1250 mG  + Vitamin D (OsCal 500 + D) 1 Tablet(s) Oral <User Schedule>  chlorhexidine 0.12% Liquid 15 milliLiter(s) Oral Mucosa every 12 hours  chlorhexidine 4% Liquid 1 Application(s) Topical daily  dextrose 5%. 1000 milliLiter(s) (50 mL/Hr) IV Continuous <Continuous>  dextrose 5%. 1000 milliLiter(s) (100 mL/Hr) IV Continuous <Continuous>  dextrose 50% Injectable 25 Gram(s) IV Push once  dextrose 50% Injectable 12.5 Gram(s) IV Push once  diclofenac sodium 1% Gel 2 Gram(s) Topical every 6 hours  escitalopram 10 milliGRAM(s) Oral <User Schedule>  fentaNYL   Patch  12 MICROgram(s)/Hr 1 Patch Transdermal every 72 hours  fentaNYL   Patch  25 MICROgram(s)/Hr 1 Patch Transdermal every 72 hours  gabapentin Solution 250 milliGRAM(s) Oral <User Schedule>  glucagon  Injectable 1 milliGRAM(s) IntraMuscular once  hemorrhoidal Ointment 1 Application(s) Rectal at bedtime  influenza  Vaccine (HIGH DOSE) 0.5 milliLiter(s) IntraMuscular once  insulin lispro (ADMELOG) corrective regimen sliding scale   SubCutaneous every 6 hours  insulin lispro Injectable (ADMELOG) 30 Unit(s) SubCutaneous <User Schedule>  insulin lispro Injectable (ADMELOG) 18 Unit(s) SubCutaneous <User Schedule>  lactobacillus acidophilus 1 Tablet(s) Oral <User Schedule>  levothyroxine 125 MICROGram(s) Oral <User Schedule>  lidocaine   4% Patch 4 Patch Transdermal every 24 hours  loperamide Liquid 2 milliGRAM(s) Enteral Tube every 12 hours  melatonin 12 milliGRAM(s) Oral <User Schedule>  nystatin Powder 1 Application(s) Topical every 8 hours  pantoprazole  Injectable 40 milliGRAM(s) IV Push every 12 hours  predniSONE  Solution 5 milliGRAM(s) Enteral Tube <User Schedule>  QUEtiapine 12.5 milliGRAM(s) Oral <User Schedule>  sodium chloride 1 Gram(s) Oral every 12 hours  sodium chloride 0.65% Nasal 1 Spray(s) Both Nostrils every 6 hours  Viokace Flush 1 Tablet(s) 1 Tablet(s) Enteral Tube once  witch hazel Pads 1 Application(s) Topical every 12 hours    MEDICATIONS  (PRN):  aluminum hydroxide/magnesium hydroxide/simethicone Suspension 30 milliLiter(s) Enteral Tube every 4 hours PRN Dyspepsia  oxyCODONE    IR 2.5 milliGRAM(s) Oral every 6 hours PRN Severe Pain (7 - 10)      Allergies    walnut (Unknown)  metronidazole (Rash)  Lyrica (Unknown)  penicillin (Unknown)  Pineapple (Unknown)  Tagamet (Unknown)  heparin (Unknown)  Pecans (Unknown)  Hazelnut (Unknown)  meropenem (Rash)    Intolerances    ROS- not able to be obtained    Vital Signs Last 24 Hrs  T(C): 36.4 (31 Oct 2024 05:27), Max: 36.6 (31 Oct 2024 00:14)  T(F): 97.6 (31 Oct 2024 05:27), Max: 97.9 (31 Oct 2024 00:14)  HR: 81 (31 Oct 2024 11:45) (66 - 81)  BP: 141/64 (31 Oct 2024 05:27) (100/67 - 144/62)  BP(mean): --  RR: 18 (31 Oct 2024 05:27) (18 - 18)  SpO2: 100% (31 Oct 2024 11:45) (99% - 100%)    Parameters below as of 31 Oct 2024 11:45  Patient On (Oxygen Delivery Method): ventilator        PHYSICAL EXAM  General: adult in NAD  HEENT: Trach  Neck: supple  CV: normal S1/S2  Lungs: clear to auscultation, no wheezes, no rales  Abdomen: soft non-tender non-distended,, positive bowel sounds  Ext: no calf tenderness  Lymph Nodes: No LAD in neck  Neuro: lethargic    LABS:                          8.8    11.31 )-----------( 159      ( 31 Oct 2024 08:21 )             29.9         Mean Cell Volume : 89.0 fl  Mean Cell Hemoglobin : 26.2 pg  Mean Cell Hemoglobin Concentration : 29.4 g/dL  Auto Neutrophil # : x  Auto Lymphocyte # : x  Auto Monocyte # : x  Auto Eosinophil # : x  Auto Basophil # : x  Auto Neutrophil % : x  Auto Lymphocyte % : x  Auto Monocyte % : x  Auto Eosinophil % : x  Auto Basophil % : x      Serial CBC's  10-31 @ 08:21  Hct-29.9 / Hgb-8.8 / Plat-159 / RBC-3.36 / WBC-11.31  Serial CBC's  10-30 @ 09:42  Hct-24.3 / Hgb-6.8 / Plat-156 / RBC-2.59 / WBC-11.06  Serial CBC's  10-30 @ 06:36  Hct-24.2 / Hgb-6.9 / Plat-166 / RBC-2.60 / WBC-12.47  Serial CBC's  10-29 @ 15:02  Hct-25.5 / Hgb-7.3 / Plat-150 / RBC-2.75 / WBC-14.19  Serial CBC's  10-28 @ 13:05  Hct-28.9 / Hgb-8.2 / Plat-146 / RBC-3.12 / WBC-11.46  Serial CBC's  10-28 @ 10:20  Hct-24.6 / Hgb-6.9 / Plat-114 / RBC-2.59 / WBC-12.59        10-31    135  |  102  |  22  ----------------------------<  110[H]  4.0   |  22  |  <0.30[L]    Ca    7.9[L]      31 Oct 2024 08:21  Phos  3.9     10-31  Mg     1.8     10-31    TPro  6.1  /  Alb  2.3[L]  /  TBili  0.3  /  DBili  x   /  AST  25  /  ALT  16  /  AlkPhos  105  10-31          Ferritin: 177 ng/mL (10-30 @ 06:37)  Iron - Total Binding Capacity.: 162 ug/dL (10-30 @ 06:37)  Reticulocyte Percent: 3.6 % (10-30 @ 06:36)          10-30 @ 06:37    ldh1 --  haptoglobin 224  BAILEY--  uric acid--        Radiology:  < from: CT Chest w/ IV Cont (10.18.24 @ 17:18) >  IMPRESSION:    Multifocal pneumonia.    New small pleural effusions, and increased subcutaneous edema compared to   10/7/2024.    Gastrocutaneous fistula. No evidence of drainable fluid collection.

## 2024-10-31 NOTE — PROVIDER CONTACT NOTE (HYPOGLYCEMIA EVENT) - NS PROVIDER CONTACT ASSESS-HYPO
pt appears asymptomatic, finished daily feeds 21:30 (received 960ml total for day). Large liquid BM prior to finger stick
74 year old female admitted for ARDS, pt BS was 274 during the night, 6 units of insulin, repeat BS @5AM 58 and 60, 12.5 amps given repeat 194 and one hour later 113,

## 2024-10-31 NOTE — PROGRESS NOTE ADULT - PROBLEM SELECTOR PLAN 1
- Please monitor blood glucose values q 6 hours while on tube feeding or NPO  - Decrease Lantus to 14 units at 6 am.   - Continue Admelog 18 u at 0600 (if FBG <100s administer 9 units instead, Hold if TFs are on hold)  - Adjust Admelog  to 30 units at 1200 ( if BG < 100 give 15 units instead, hold if TFs are on hold)   - Discontinue 6pm standing admelog dose.   - C/w low dose Admelog correctional scale q6h while on TFs/NPO/overnight   - Please keep hypoglycemia protocol in place  Discharge Plan:  - TBD depending on insulin requirement and discharge tube feeding regimen (Bolus vs continuous tube feeding)   - If bolus tube feeding > Lantus plus Humalog   - Patient should have BGs checked 4x a day while on TFs.    Daughter aware to contact Endocrinologist if BG <70mg/dL x1 or >200mg/dL consistently or >400mg/dL x1.   - Followup with Dr Ruth: [facility]: [address] [place] [address]. [address]. [place] [address]. Tel: [phone] Telemedicine- daughter to schedule.   - Make sure pt has Rx for all DM supplies and insulin

## 2024-10-31 NOTE — PROGRESS NOTE ADULT - NS ATTEND AMEND GEN_ALL_CORE FT
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 Found to have Pseudomonas aeruginosa in sputum culture and urine culture with ESBL E. coli s/p course of meropenem. Course complicated by antibiotic-associated diarrhea.    Was having high stool output, now improved. But with increased gastric output. Abn Xray WNL.   Not pressure supporting well  Being monitored off abx.   Low h/h pending repeat. s/p 1 unit prbc with over correction    Having more diarrhea today.     # Diarrhea, hx of c.diff  # chronic hypoxemic respiratory failure  # oropharyngeal dysphagia  # Anemia  # T2 DM  # functional quadriplegia  # Gastrocutaneous fistula  # RA  - C/W current pain regiement, well controlled. C/W escitalopram and seroquel  - continue lung protective ventilation. Pressure support trials as tolerates. Does poorly on PS trials. Continue Duonebs.. Unlikely to come off the vent.  - More euvolemic, third spacing 2/2 to low albumin and immobility.   - C/W loperimid, per ID repeat c.diff if liquid stool. Check C. diff.   - hx of MDRO, are with ID, monitor off abx for now.   - Anemia multifactoral, hemorrhoids, AOCD. s/p EGD without active bleeding.   - difficult to manage FSG, hypoglycemic today. F/U with endo recs.   -  RA, on home prednisone 5 mg daily. Hold off on Enbrel at this time  -Dispo: full code, prognosis guarded, discussed with patient and daughter tong Gregg/mike planning.

## 2024-10-31 NOTE — PROVIDER CONTACT NOTE (HYPOGLYCEMIA EVENT) - NS PROVIDER CONTACT RECOMMEND-HYPO
MD said to hold insulin for this morning and will follow up with the day team 
Give dextrose 50% 12.5 gram IV push and repeat finger stick until over glucose 100

## 2024-10-31 NOTE — PROGRESS NOTE ADULT - SUBJECTIVE AND OBJECTIVE BOX
Patient is a 74y old  Female who presents with a chief complaint of Patient admitted with Hypoxemia and episode of Bright red blood per rectum (30 Oct 2024 15:15)      Interval Events:    REVIEW OF SYSTEMS:  [ ] Positive  [ ] All other systems negative  [ ] Unable to assess ROS because ________    Vital Signs Last 24 Hrs  T(C): 36.4 (10-31-24 @ 05:27), Max: 37.2 (10-30-24 @ 12:00)  T(F): 97.6 (10-31-24 @ 05:27), Max: 98.9 (10-30-24 @ 12:00)  HR: 80 (10-31-24 @ 06:02) (66 - 89)  BP: 141/64 (10-31-24 @ 05:27) (100/67 - 144/62)  RR: 18 (10-31-24 @ 05:27) (18 - 18)  SpO2: 99% (10-31-24 @ 06:02) (99% - 100%)    PHYSICAL EXAM:  HEENT:   [ ]Tracheostomy:  [ ]Pupils equal  [ ]No oral lesions  [ ]Abnormal    SKIN  [ ]No Rash  [ ] Abnormal  [ ] pressure    CARDIAC  [ ]Regular  [ ]Abnormal    PULMONARY  [ ]Bilateral Clear Breath Sounds  [ ]Normal Excursion  [ ]Abnormal    GI  [ ]PEG      [ ] +BS		              [ ]Soft, nondistended, nontender	  [ ]Abnormal    MUSCULOSKELETAL                                   [ ]Bedbound                 [ ]Abnormal    [ ]Ambulatory/OOB to chair                           EXTREMITIES                                         [ ]Normal  [ ]Edema                           NEUROLOGIC  [ ] Normal, non focal  [ ] Focal findings:    PSYCHIATRIC  [ ]Alert and appropriate  [ ] Sedated	 [ ]Agitated    :  Mcbride: [ ] Yes, if yes: Date of Placement:                   [  ] No    LINES: Central Lines [ ] Yes, if yes: Date of Placement                                     [  ] No    HOSPITAL MEDICATIONS:  MEDICATIONS  (STANDING):  acetaminophen   Oral Liquid .. 650 milliGRAM(s) Enteral Tube every 8 hours  albuterol/ipratropium for Nebulization 3 milliLiter(s) Nebulizer every 6 hours  artificial  tears Solution 1 Drop(s) Both EYES every 6 hours  artificial tears (preservative free) Ophthalmic Solution 1 Drop(s) Both EYES four times a day  ascorbic acid 500 milliGRAM(s) Oral daily  Biotene Dry Mouth Oral Rinse 5 milliLiter(s) Swish and Spit every 6 hours  calcium carbonate 1250 mG  + Vitamin D (OsCal 500 + D) 1 Tablet(s) Oral <User Schedule>  chlorhexidine 0.12% Liquid 15 milliLiter(s) Oral Mucosa every 12 hours  chlorhexidine 4% Liquid 1 Application(s) Topical daily  dextrose 5%. 1000 milliLiter(s) (100 mL/Hr) IV Continuous <Continuous>  dextrose 5%. 1000 milliLiter(s) (50 mL/Hr) IV Continuous <Continuous>  dextrose 50% Injectable 12.5 Gram(s) IV Push once  dextrose 50% Injectable 25 Gram(s) IV Push once  diclofenac sodium 1% Gel 2 Gram(s) Topical every 6 hours  escitalopram 10 milliGRAM(s) Oral <User Schedule>  fentaNYL   Patch  12 MICROgram(s)/Hr 1 Patch Transdermal every 72 hours  fentaNYL   Patch  25 MICROgram(s)/Hr 1 Patch Transdermal every 72 hours  gabapentin Solution 250 milliGRAM(s) Oral <User Schedule>  glucagon  Injectable 1 milliGRAM(s) IntraMuscular once  hemorrhoidal Ointment 1 Application(s) Rectal at bedtime  influenza  Vaccine (HIGH DOSE) 0.5 milliLiter(s) IntraMuscular once  insulin glargine Injectable (LANTUS) 18 Unit(s) SubCutaneous <User Schedule>  insulin lispro (ADMELOG) corrective regimen sliding scale   SubCutaneous every 6 hours  insulin lispro Injectable (ADMELOG) 28 Unit(s) SubCutaneous <User Schedule>  insulin lispro Injectable (ADMELOG) 18 Unit(s) SubCutaneous <User Schedule>  insulin lispro Injectable (ADMELOG) 13 Unit(s) SubCutaneous <User Schedule>  lactobacillus acidophilus 1 Tablet(s) Oral <User Schedule>  levothyroxine 125 MICROGram(s) Oral <User Schedule>  lidocaine   4% Patch 4 Patch Transdermal every 24 hours  loperamide Liquid 2 milliGRAM(s) Enteral Tube every 12 hours  melatonin 12 milliGRAM(s) Oral <User Schedule>  nystatin Powder 1 Application(s) Topical every 8 hours  pantoprazole  Injectable 40 milliGRAM(s) IV Push every 12 hours  predniSONE  Solution 5 milliGRAM(s) Enteral Tube <User Schedule>  QUEtiapine 12.5 milliGRAM(s) Oral <User Schedule>  sodium chloride 1 Gram(s) Oral every 12 hours  sodium chloride 0.65% Nasal 1 Spray(s) Both Nostrils every 6 hours  witch hazel Pads 1 Application(s) Topical every 12 hours    MEDICATIONS  (PRN):  aluminum hydroxide/magnesium hydroxide/simethicone Suspension 30 milliLiter(s) Enteral Tube every 4 hours PRN Dyspepsia  oxyCODONE    IR 2.5 milliGRAM(s) Oral every 6 hours PRN Severe Pain (7 - 10)      LABS:                           CAPILLARY BLOOD GLUCOSE    MICROBIOLOGY:     RADIOLOGY:  [ ] Reviewed and interpreted by me    Mode: AC/ CMV (Assist Control/ Continuous Mandatory Ventilation)  RR (machine): 18  TV (machine): 350  FiO2: 30  PEEP: 5  ITime: 1  MAP: 9  PIP: 26   Patient is a 74y old  Female who presents with a chief complaint of Patient admitted with Hypoxemia and episode of Bright red blood per rectum (30 Oct 2024 15:15)      Interval Events:  Aysmptomatic hypoglycemia 1AM and 6AM in 60s.    REVIEW OF SYSTEMS:  [ ] Positive  [X] All other systems negative  [ ] Unable to assess ROS because ________    Vital Signs Last 24 Hrs  T(C): 36.4 (10-31-24 @ 05:27), Max: 37.2 (10-30-24 @ 12:00)  T(F): 97.6 (10-31-24 @ 05:27), Max: 98.9 (10-30-24 @ 12:00)  HR: 80 (10-31-24 @ 06:02) (66 - 89)  BP: 141/64 (10-31-24 @ 05:27) (100/67 - 144/62)  RR: 18 (10-31-24 @ 05:27) (18 - 18)  SpO2: 99% (10-31-24 @ 06:02) (99% - 100%)        PHYSICAL EXAM:  HEENT:   [X]Tracheostomy: #8 distal XLT shiley  [X] PERRL B/L; EOMI  [ ]No oral lesions  [ ]Abnormal    SKIN  [ ]No Rash  [X] Abnormal - IAD, MAD at old PEG site  [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    CARDIAC  [X]Regular  [ ]Abnormal    PULMONARY  [X]Bilateral Clear Breath Sounds  [ ]Normal Excursion  [ ]Abnormal    GI  [X]PEGJ      [X] +BS		              [X]Soft, nondistended, nontender	  [X]Abnormal:  healing stoma/enterocutaneous fistula, site is dry without expressable discharge    MUSCULOSKELETAL                                   [X] Bedbound                 [ ]Abnormal    [ ]Ambulatory/OOB to chair                           EXTREMITIES                                         [ ]Normal  [X]Edema - generalized edema 1+                 NEUROLOGIC  [ ] Normal, non focal  [X] Focal findings: awake, alert, following commands on all 4 extremities     PSYCHIATRIC  [X] Alert and appropriate  [ ] Sedated	 [ ]Agitated    :  Mcbride: [ ] Yes, if yes: Date of Placement:                   [X] No    LINES: Central Lines [ ] Yes, if yes: Date of Placement                                     [X] No      HOSPITAL MEDICATIONS:  MEDICATIONS  (STANDING):  acetaminophen   Oral Liquid .. 650 milliGRAM(s) Enteral Tube every 8 hours  albuterol/ipratropium for Nebulization 3 milliLiter(s) Nebulizer every 6 hours  artificial  tears Solution 1 Drop(s) Both EYES every 6 hours  artificial tears (preservative free) Ophthalmic Solution 1 Drop(s) Both EYES four times a day  ascorbic acid 500 milliGRAM(s) Oral daily  Biotene Dry Mouth Oral Rinse 5 milliLiter(s) Swish and Spit every 6 hours  calcium carbonate 1250 mG  + Vitamin D (OsCal 500 + D) 1 Tablet(s) Oral <User Schedule>  chlorhexidine 0.12% Liquid 15 milliLiter(s) Oral Mucosa every 12 hours  chlorhexidine 4% Liquid 1 Application(s) Topical daily  dextrose 5%. 1000 milliLiter(s) (100 mL/Hr) IV Continuous <Continuous>  dextrose 5%. 1000 milliLiter(s) (50 mL/Hr) IV Continuous <Continuous>  dextrose 50% Injectable 12.5 Gram(s) IV Push once  dextrose 50% Injectable 25 Gram(s) IV Push once  diclofenac sodium 1% Gel 2 Gram(s) Topical every 6 hours  escitalopram 10 milliGRAM(s) Oral <User Schedule>  fentaNYL   Patch  12 MICROgram(s)/Hr 1 Patch Transdermal every 72 hours  fentaNYL   Patch  25 MICROgram(s)/Hr 1 Patch Transdermal every 72 hours  gabapentin Solution 250 milliGRAM(s) Oral <User Schedule>  glucagon  Injectable 1 milliGRAM(s) IntraMuscular once  hemorrhoidal Ointment 1 Application(s) Rectal at bedtime  influenza  Vaccine (HIGH DOSE) 0.5 milliLiter(s) IntraMuscular once  insulin glargine Injectable (LANTUS) 18 Unit(s) SubCutaneous <User Schedule>  insulin lispro (ADMELOG) corrective regimen sliding scale   SubCutaneous every 6 hours  insulin lispro Injectable (ADMELOG) 28 Unit(s) SubCutaneous <User Schedule>  insulin lispro Injectable (ADMELOG) 18 Unit(s) SubCutaneous <User Schedule>  insulin lispro Injectable (ADMELOG) 13 Unit(s) SubCutaneous <User Schedule>  lactobacillus acidophilus 1 Tablet(s) Oral <User Schedule>  levothyroxine 125 MICROGram(s) Oral <User Schedule>  lidocaine   4% Patch 4 Patch Transdermal every 24 hours  loperamide Liquid 2 milliGRAM(s) Enteral Tube every 12 hours  melatonin 12 milliGRAM(s) Oral <User Schedule>  nystatin Powder 1 Application(s) Topical every 8 hours  pantoprazole  Injectable 40 milliGRAM(s) IV Push every 12 hours  predniSONE  Solution 5 milliGRAM(s) Enteral Tube <User Schedule>  QUEtiapine 12.5 milliGRAM(s) Oral <User Schedule>  sodium chloride 1 Gram(s) Oral every 12 hours  sodium chloride 0.65% Nasal 1 Spray(s) Both Nostrils every 6 hours  witch hazel Pads 1 Application(s) Topical every 12 hours    MEDICATIONS  (PRN):  aluminum hydroxide/magnesium hydroxide/simethicone Suspension 30 milliLiter(s) Enteral Tube every 4 hours PRN Dyspepsia  oxyCODONE    IR 2.5 milliGRAM(s) Oral every 6 hours PRN Severe Pain (7 - 10)      LABS:                           CAPILLARY BLOOD GLUCOSE    MICROBIOLOGY:     RADIOLOGY:  [ ] Reviewed and interpreted by me    Mode: AC/ CMV (Assist Control/ Continuous Mandatory Ventilation)  RR (machine): 18  TV (machine): 350  FiO2: 30  PEEP: 5  ITime: 1  MAP: 9  PIP: 26

## 2024-10-31 NOTE — PROGRESS NOTE ADULT - ASSESSMENT
73 yo F w/h/o controlled T2DM (A1C 5.8%) on insulin at home. Also HTN, HLD, hypothyroidism, RA on Prednisone, Fibromyalgia, cerebral aneurysm s/p repair, chronic hypercapnic respiratory failure s/p trach (vent dependent) and chronic PEG 2022, recurrent C. diff infection (follows with Dr. 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 S/p antibiotics and s/p GJ tube exchanges on 10/14, s/p PEG replacement 10/21. Endocrine consulted for Type 2 DM management while on tube feeding.   Patient tolerating TFs. Tolerating 12 hour tube feeding well ( Acopio Peptide 1.5 (KFPEPT1.5RTH)at 80ml/hr for 12 hours total volume 960ml)). Noted near hypoglycemia this am ( BG 69 and repeat 79) and on standing Admelog q 6 hours X3 at 06:00, 12:00, and 18:00 while on 12 hour tube feeding. As per RN, TFs are not always completed at 6 pm because TFs are held for hygiene and position changes.         Home DM medications: Lantus 35 units at HS, Humalog 26 units with # 1 feeding, 22 units with #2 tube feeding, 20 units with #3 tube feeding and  Humalog correction scale (  2 units for -042, 4 units for -250, 6 units for -300, 8units for -350, 10 units for -400, 12 units for -450)   while on [employer] formula 3 cans/day, slow bolus( run at 80 ml/hr total volume 960 ml/day> 1st can around 6:30-8:30, 2nd can around 3 pm, 3rd can around 8-9 pm) plus Banatrol 1/2 packet BID, was increasing to 1 packet BID, plus Kefir 10 oz/day ( divided in multiple times throughout the day).                         73 yo F w/h/o controlled T2DM (A1C 5.8%) on insulin at home. Also HTN, HLD, hypothyroidism, RA on Prednisone, Fibromyalgia, cerebral aneurysm s/p repair, chronic hypercapnic respiratory failure s/p trach (vent dependent) and chronic PEG 2022, recurrent C. diff infection (follows with Dr. 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 S/p antibiotics and s/p GJ tube exchanges on 10/14, s/p PEG replacement 10/21. Endocrine consulted for Type 2 DM management while on tube feeding.   Patient tolerating TFs. Tolerating 12 hour tube feeding well ( [employer] Peptide 1.5 (KFPEPT1.5RTH)at 80ml/hr for 12 hours total volume 960ml)). Noted near hypoglycemia this am ( BG 69 and repeat 79) and on standing Admelog q 6 hours X3 at 06:00, 12:00, and 18:00 while on 12 hour tube feeding. As per RN, TFs are not always completed at 6 pm because TFs are held for hygiene and position changes.         Home DM medications: Lantus 35 units at HS, Humalog 26 units with # 1 feeding, 22 units with #2 tube feeding, 20 units with #3 tube feeding and  Humalog correction scale (  2 units for -184, 4 units for -250, 6 units for -300, 8units for -350, 10 units for -400, 12 units for -450)   while on [employer] formula 3 cans/day, slow bolus( run at 80 ml/hr total volume 960 ml/day> 1st can around 6:30-8:30, 2nd can around 3 pm, 3rd can around 8-9 pm) plus Banatrol 1/2 packet BID, was increasing to 1 packet BID, plus Kefir 10 oz/day ( divided in multiple times throughout the day).    Addendum at 17:30   Noted hypoglycemia event BG 61 and repeat 79, which treated with D50%  Spoke with team and RN. J- port got clogged right after standing admelog at 1 pm, so pt was not getting tube feeding     - Will decrease Lantus 10 units at 6 am    - Stop standing Admelog insulin while holding tube feeding   - continue hypoglycemia protocol   - continue to titrate dextrose ivf as needed to maintain euglycemia, BG target 100 to 180mg/dL   - once fs bg levels > 200, discontinue dextrose ivf and continue fs bg monitoring at least [id number]. Can extend to [id number] if 2 x FS off dextrose remain stable, 100 to 180mg/dl range     Nissa Veliz NP-C

## 2024-10-31 NOTE — PROGRESS NOTE ADULT - ASSESSMENT
73 yo F PMH T2DM on insulin, HTN, HLD, hypothyroidism, RA on Prednisone, Fibromyalgia, cerebral aneurysm s/p repair, chronic hypercapnic respiratory failure s/p trach (vent dependent) and chronic PEG 2022, recurrent C. diff infection (follows with Dr. 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  Pt also noted to have rectal bleeding this past week requiring transfusion 5 days ago in ED as per daughter. Daughter also reports brownish discharge from G-J tube. In ED, pt afebrile, fiO2 96-98% on 40% on ventilator.  Chest X-ray w/ opacification of right lung concerning for possible PNA.  Admitted to RCU for further management. Sputum cxs grew PSA; treated with course of Cefepime. Patient with decreased H+H received 1 unit of PRBCS on 10/10.  10/14 EGD w/ Dr. Rosales for PEGJ exchange and clippings placed for closure of fistula site.  Persistent fevers treated with meropenem and vanc (d/c'd 10/20).  CT A/P without infectious source.  Continue airway management with duonebs, chest PT and suction PRN.        10/30: Patient given 1 unit of PRBCs today hgb 6.8. Dr. Myah Coleman called for consult. Patient seen by Optho recommended discontinuation of prednisolone gtts and initiating artificial tears. RT Dislodged yesterday remains with frequent stooling but not diarrhea. Will resume Imodium 2mg q 12hrs. Continue to monitor Gastric output improved today.   75 yo F PMH T2DM on insulin, HTN, HLD, hypothyroidism, RA on Prednisone, Fibromyalgia, cerebral aneurysm s/p repair, chronic hypercapnic respiratory failure s/p trach (vent dependent) and chronic PEG 2022, recurrent C. diff infection (follows with Dr. 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  Pt also noted to have rectal bleeding this past week requiring transfusion 5 days ago in ED as per daughter. Daughter also reports brownish discharge from G-J tube. In ED, pt afebrile, fiO2 96-98% on 40% on ventilator.  Chest X-ray w/ opacification of right lung concerning for possible PNA.  Admitted to RCU for further management. Sputum cxs grew PSA; treated with course of Cefepime. Patient with decreased H+H received 1 unit of PRBCS on 10/10.  10/14 EGD w/ Dr. Rosales for PEGJ exchange and clippings placed for closure of fistula site.  Persistent fevers treated with meropenem and vanc (d/c'd 10/20).  CT A/P without infectious source.  Continue airway management with duonebs, chest PT and suction PRN.        10/30: Patient given 1 unit of PRBCs today hgb 6.8. Dr. Myah Coleman called for consult. Patient seen by Optho recommended discontinuation of prednisolone gtts and initiating artificial tears. RT Dislodged yesterday remains with frequent stooling but not diarrhea. Will resume Imodium 2mg q 12hrs. Continue to monitor Gastric output improved today.    10/31: Hgb over responded to 1 unit.  No signs of bleeding observed, stool remains brown.  Hematology consult noted.  J-port again obstructed distally.  Attempted gingerale and sodium bicarb flush but unable to unclog.  GI, Dr. Rosales, notified.  Tube feeding study for tube position performed.  NPO for now, D5NS until glucose stabilizes given insulin still in system.

## 2024-10-31 NOTE — PROGRESS NOTE ADULT - PROBLEM SELECTOR PLAN 5
LDL goal <70 due to DM  Pt LDL NA  Order fasting lipid if not recently done  Statin as per primary team. Not getting statin at this time   Manage per primary team   F/u levels as out pt      Contact via [employer] Teams during business hours  To reach covering provider access AMION via sunrise tools  For Urgent matters/after-hours/weekends/holidays please page endocrine fellow on call   For nonurgent matters please email [email or web address]    Please note that this patient may be followed by different provider tomorrow.  Notify endocrine 24 hours prior to discharge for final recommendations

## 2024-11-01 NOTE — PROGRESS NOTE ADULT - ASSESSMENT
73 yo woman PMH T2DM on insulin, HTN, HLD, hypothyroidism, RA on Prednisone, Fibromyalgia, cerebral aneurysm s/p repair, chronic hypercapnic respiratory failure s/p trach (vent dependent) and chronic PEG 2022, recurrent C. diff infection , persistent GJ tube leaks now s/p GC fistula repair 8/12  admitted 10/7/24 with resp distress and found to have RML opacity     Antibiotics  Ceftriaxone 10/7  Azithro 10/7  Cefepime 10/7--> 10/12  meropenem 10/15 --> 10/23  IV Vanco 10/17 --> 10/21  Vanco via NGT 10/24; 11/1-->      10/10 melena, anemia, blood tx  10/14 EGD for GJ exchange and piror PEG site closure  One benign-appearing, intrinsic moderate stenosis was found in the upper third of the        esophagus. The stenosis was traversed.       The cardia and gastric fundus were normal.       A gastric tube with tip in the jejunum was found in the gastric body. The PEG required        removal because it was leaking. The J tube extension (12F) was removed first. An externally        removable 24 Fr EndoVive Safety gastrostomy tube was lubricated. The guide wire was passed        through the existing G-tube port and snared endoscopically. The endoscope and snare were then        removed, pulling the wire out through the mouth. The g-tube was tied to the guidewire, pulled        through the mouth into the stomach and then pulled out from the stomach through the skin. The        bumper was attached to the gastrostomy tube. The feeding tube was then cut to an appropriate        length. The final position of the gastrostomy tube was confirmed by relook endoscopy, and        skin marking noted to be 3 cm at the external bumper. The final tension and compression of        the abdominal wall by the PEG tube and external bumper were checked and revealed that the        bumper was moderately tight and mildly deforming the skin. The J tube extension (12 F        EndoVive Jejunal feeding tube) was advanced into the stomach. The tip of the J tube had a        loop thread that was captured with a Resolution clip. The thread and the J tube extension        were advanced to the proximal jejunum, and the endoclip along with the tip of the J tube was        attached to the wall securely. The J tube extension was capped, and the tube site was cleaned        and dressed.       A small fistula was found on the anterior wall of the gastric body related to prior G tube        site. Coagulation of tissue near the fistula using argon plasma at 1.2 liters/minute and 35        peraza was successful. To closure the gastrocutaneous fistula, four hemostatic clips were        successfully placed (MR conditional, two 16 mm DuraClip and 2 Mantis clips.       The examined duodenum was normal.    10/14, 10/15 Fever, transient hypoxia post procedure  10/15 ProCalcitonin = 8.48 suggestive of bacterial process; BCx x2 NGTD; Trach: Pseudomonas   - though benign mg stain  10;16 Leukocytosis WBC = 14.26  10/17 fever, hypotension, abd distension, no diarrhea noted this am WBC = 15.02; Rising Procalcitonin = 38.49; Obstructed J tube   10/18  lost IV access re-gained  - CT scan late this afternoon  WBC = 8.68; Rising Procalcitonin >100  10/18 imaging suggests multifocal pneumonia  10/21 improved chest exam, Procalcitonin level considerably decreased, decreased FiO2  - afebrile since 10/18  10/21 UGI endoscopy done  Findings:       The esophagus was normal. A fistula was found on the anterior wall of the gastric body.       There was evidence of a gastrostomy present in the gastric body. The patient was placed in        the supine position. The endoscope was advanced to the jejunum and the prior placed J tube        with loop attached to the wall and the loop thread was cut by endoclip. The J tube and the G        tube were removed. The gastrostomy fistula was utilized and an Avanos 22 F        Gastrostomy/Jejunal tube was advanced. The jejunal tip was advanced through the pylorus and        into the duodenum. The endoscope was then advanced to the duodenum and the loop thread at the        tip of the J tube was captured and advanced to the proximal jejunum. The loop thread was        clipped to wall by a x.ai Resolution clip. The J tube flushed easily and the G        tube decompress the abdomen easily. The internal balloon was insufflated with 10 cc of water        to hold the GJ tube in place. The outside marking was at 3.  10/23  no distress, liquid stools noted  - Meropenem discontinued  GI PCR NEG  10/24  persistent diarrhea  Cdiff NEG; enteral vanco stopped and Immodium provided  10/28 low grade temps, mild leukocytosis  10/30 blood transfusion  11/1  fever  +Cdiff      Issues  cerebral aneurysm s/p repair  chronic hypercapnic respiratory failure s/p trach (vent dependent) and chronic PEG 2022  anemia  - had iron deficiency  Pseudomonas aeruginosa upper airway colonization  (most recent isolate Resist to Cipro/Levo)  previous Cdiff  T2DM on insulin  HTN  HLD  hypothyroidism  RA on Prednisone  Fibromyalgia  debility  sacral ulcer largely healed  malnourished/chronic catobolic state      Suggest  vanco 125 q 6 hours via J tube  please check abd xray to assess for colonic dilatation    discussed with RCU team

## 2024-11-01 NOTE — PROGRESS NOTE ADULT - NUTRITIONAL ASSESSMENT
Diet, NPO with Tube Feed:   Tube Feeding Modality: Jejunostomy  Casie Polimetrix Peptide 1.5 (KFPEPT1.5RTH)  Total Volume for 24 Hours (mL): 960  Continuous  Until Goal Tube Feed Rate (mL per Hour): 80  Tube Feed Duration (in Hours): 12  Tube Feed Start Time: 06:00  Free Water Flush  Pump   Rate (mL per Hour): 50   Frequency: Every Hour  Free Water Flush Instructions:  50ml free water flushes Q1hr  Alfred(7 Gm Arginine/7 Gm Glut/1.2 Gm HMB     Qty per Day:  2  No Carb Prosource (1pkg = 15gms Protein)     Qty per Day:  1  Banatrol TF     Qty per Day:  1/2 packet per daily (11-01-24 @ 08:28) [Active]

## 2024-11-01 NOTE — PROGRESS NOTE ADULT - SUBJECTIVE AND OBJECTIVE BOX
Patient is a 74y old  Female who presents with a chief complaint of Patient admitted with Hypoxemia and episode of Bright red blood per rectum (31 Oct 2024 16:01)      Interval Events:    REVIEW OF SYSTEMS:  [ ] Positive  [ ] All other systems negative  [ ] Unable to assess ROS because ________    Vital Signs Last 24 Hrs  T(C): 39 (11-01-24 @ 06:25), Max: 39 (11-01-24 @ 06:25)  T(F): 102.2 (11-01-24 @ 06:25), Max: 102.2 (11-01-24 @ 06:25)  HR: 104 (11-01-24 @ 06:25) (76 - 104)  BP: 129/48 (11-01-24 @ 06:25) (127/62 - 172/77)  RR: 18 (11-01-24 @ 06:25) (18 - 18)  SpO2: 100% (11-01-24 @ 06:25) (99% - 100%)    PHYSICAL EXAM:  HEENT:   [ ]Tracheostomy:  [ ]Pupils equal  [ ]No oral lesions  [ ]Abnormal    SKIN  [ ]No Rash  [ ] Abnormal  [ ] pressure    CARDIAC  [ ]Regular  [ ]Abnormal    PULMONARY  [ ]Bilateral Clear Breath Sounds  [ ]Normal Excursion  [ ]Abnormal    GI  [ ]PEG      [ ] +BS		              [ ]Soft, nondistended, nontender	  [ ]Abnormal    MUSCULOSKELETAL                                   [ ]Bedbound                 [ ]Abnormal    [ ]Ambulatory/OOB to chair                           EXTREMITIES                                         [ ]Normal  [ ]Edema                           NEUROLOGIC  [ ] Normal, non focal  [ ] Focal findings:    PSYCHIATRIC  [ ]Alert and appropriate  [ ] Sedated	 [ ]Agitated    :  Mcbride: [ ] Yes, if yes: Date of Placement:                   [  ] No    LINES: Central Lines [ ] Yes, if yes: Date of Placement                                     [  ] No    HOSPITAL MEDICATIONS:  MEDICATIONS  (STANDING):  albuterol/ipratropium for Nebulization 3 milliLiter(s) Nebulizer every 6 hours  artificial  tears Solution 1 Drop(s) Both EYES every 6 hours  artificial tears (preservative free) Ophthalmic Solution 1 Drop(s) Both EYES four times a day  ascorbic acid 500 milliGRAM(s) Oral daily  Biotene Dry Mouth Oral Rinse 5 milliLiter(s) Swish and Spit every 6 hours  calcium carbonate 1250 mG  + Vitamin D (OsCal 500 + D) 1 Tablet(s) Oral <User Schedule>  chlorhexidine 0.12% Liquid 15 milliLiter(s) Oral Mucosa every 12 hours  chlorhexidine 4% Liquid 1 Application(s) Topical daily  dextrose 5% + sodium chloride 0.9%. 1000 milliLiter(s) (70 mL/Hr) IV Continuous <Continuous>  dextrose 5%. 1000 milliLiter(s) (50 mL/Hr) IV Continuous <Continuous>  dextrose 5%. 1000 milliLiter(s) (100 mL/Hr) IV Continuous <Continuous>  dextrose 5%. 1000 milliLiter(s) (100 mL/Hr) IV Continuous <Continuous>  dextrose 5%. 1000 milliLiter(s) (50 mL/Hr) IV Continuous <Continuous>  dextrose 50% Injectable 25 Gram(s) IV Push once  dextrose 50% Injectable 12.5 Gram(s) IV Push once  dextrose 50% Injectable 25 Gram(s) IV Push once  diclofenac sodium 1% Gel 2 Gram(s) Topical every 6 hours  escitalopram 10 milliGRAM(s) Oral <User Schedule>  fentaNYL   Patch  12 MICROgram(s)/Hr 1 Patch Transdermal every 72 hours  fentaNYL   Patch  25 MICROgram(s)/Hr 1 Patch Transdermal every 72 hours  gabapentin Solution 250 milliGRAM(s) Oral <User Schedule>  glucagon  Injectable 1 milliGRAM(s) IntraMuscular once  glucagon  Injectable 1 milliGRAM(s) IntraMuscular once  hemorrhoidal Ointment 1 Application(s) Rectal at bedtime  influenza  Vaccine (HIGH DOSE) 0.5 milliLiter(s) IntraMuscular once  insulin glargine Injectable (LANTUS) 10 Unit(s) SubCutaneous <User Schedule>  insulin lispro (ADMELOG) corrective regimen sliding scale   SubCutaneous every 6 hours  lactobacillus acidophilus 1 Tablet(s) Oral <User Schedule>  levothyroxine 125 MICROGram(s) Oral <User Schedule>  lidocaine   4% Patch 4 Patch Transdermal every 24 hours  loperamide Liquid 2 milliGRAM(s) Enteral Tube every 12 hours  melatonin 12 milliGRAM(s) Oral <User Schedule>  nystatin Powder 1 Application(s) Topical every 8 hours  pantoprazole  Injectable 40 milliGRAM(s) IV Push every 12 hours  predniSONE  Solution 5 milliGRAM(s) Enteral Tube <User Schedule>  QUEtiapine 12.5 milliGRAM(s) Oral <User Schedule>  sodium bicarbonate  Injectable 50 milliEquivalent(s) IV Push once  sodium chloride 1 Gram(s) Oral every 12 hours  sodium chloride 0.65% Nasal 1 Spray(s) Both Nostrils every 6 hours  Viokace Flush 1 Tablet(s) 1 Tablet(s) Enteral Tube once  witch hazel Pads 1 Application(s) Topical every 12 hours    MEDICATIONS  (PRN):  aluminum hydroxide/magnesium hydroxide/simethicone Suspension 30 milliLiter(s) Enteral Tube every 4 hours PRN Dyspepsia  oxyCODONE    IR 2.5 milliGRAM(s) Oral every 6 hours PRN Severe Pain (7 - 10)      LABS:                        9.2    14.86 )-----------( 158      ( 01 Nov 2024 06:15 )             30.7     11-01    133[L]  |  99  |  10  ----------------------------<  118[H]  3.7   |  22  |  <0.30[L]    Ca    7.7[L]      01 Nov 2024 06:15  Phos  3.4     11-01  Mg     1.5     11-01    TPro  5.9[L]  /  Alb  2.0[L]  /  TBili  0.6  /  DBili  x   /  AST  29  /  ALT  17  /  AlkPhos  109  11-01      Urinalysis Basic - ( 01 Nov 2024 06:15 )    Color: x / Appearance: x / SG: x / pH: x  Gluc: 118 mg/dL / Ketone: x  / Bili: x / Urobili: x   Blood: x / Protein: x / Nitrite: x   Leuk Esterase: x / RBC: x / WBC x   Sq Epi: x / Non Sq Epi: x / Bacteria: x          CAPILLARY BLOOD GLUCOSE    MICROBIOLOGY:     RADIOLOGY:  [ ] Reviewed and interpreted by me    Mode: AC/ CMV (Assist Control/ Continuous Mandatory Ventilation)  RR (machine): 18  TV (machine): 350  FiO2: 30  PEEP: 5  ITime: 1  MAP: 10  PIP: 30   Patient is a 74y old  Female who presents with a chief complaint of Patient admitted with Hypoxemia and episode of Bright red blood per rectum (31 Oct 2024 16:01)      Interval Events: No events over night.     REVIEW OF SYSTEMS:  [X] Positive: Burning sensation in legs   [ ] All other systems negative  [ ] Unable to assess ROS because ___    Vital Signs Last 24 Hrs  T(C): 39 (11-01-24 @ 06:25), Max: 39 (11-01-24 @ 06:25)  T(F): 102.2 (11-01-24 @ 06:25), Max: 102.2 (11-01-24 @ 06:25)  HR: 104 (11-01-24 @ 06:25) (76 - 104)  BP: 129/48 (11-01-24 @ 06:25) (127/62 - 172/77)  RR: 18 (11-01-24 @ 06:25) (18 - 18)  SpO2: 100% (11-01-24 @ 06:25) (99% - 100%)        PHYSICAL EXAM:  HEENT:   [X]Tracheostomy: #8 distal XLT shiley  [X] PERRL B/L; EOMI  [ ]No oral lesions  [ ]Abnormal    SKIN  [ ]No Rash  [X] Abnormal - IAD, MAD at old PEG site  [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    CARDIAC  [X]Regular  [ ]Abnormal    PULMONARY  [X] Bilateral Clear Breath Sounds  [ ] Normal Excursion  [ ] Abnormal    GI  [X] PEGJ      [X] +BS		              [X] Soft, nondistended, nontender	  [X] Abnormal:  healing stoma/enterocutaneous fistula, site is dry without expressable discharge    MUSCULOSKELETAL                                   [X] Bedbound                 [ ]Abnormal    [ ]Ambulatory/OOB to chair                           EXTREMITIES                                         [ ]Normal  [X]Edema - generalized edema 1+                 NEUROLOGIC  [ ] Normal, non focal  [X] Focal findings: awake, alert, following commands on all 4 extremities     PSYCHIATRIC  [X] Alert and appropriate  [ ] Sedated	 [ ]Agitated    :  Mcbride: [ ] Yes, if yes: Date of Placement:                   [X] No    LINES: Central Lines [ ] Yes, if yes: Date of Placement                                     [X] No    HOSPITAL MEDICATIONS:  MEDICATIONS  (STANDING):  albuterol/ipratropium for Nebulization 3 milliLiter(s) Nebulizer every 6 hours  artificial  tears Solution 1 Drop(s) Both EYES every 6 hours  artificial tears (preservative free) Ophthalmic Solution 1 Drop(s) Both EYES four times a day  ascorbic acid 500 milliGRAM(s) Oral daily  Biotene Dry Mouth Oral Rinse 5 milliLiter(s) Swish and Spit every 6 hours  calcium carbonate 1250 mG  + Vitamin D (OsCal 500 + D) 1 Tablet(s) Oral <User Schedule>  chlorhexidine 0.12% Liquid 15 milliLiter(s) Oral Mucosa every 12 hours  chlorhexidine 4% Liquid 1 Application(s) Topical daily  dextrose 5% + sodium chloride 0.9%. 1000 milliLiter(s) (70 mL/Hr) IV Continuous <Continuous>  dextrose 5%. 1000 milliLiter(s) (50 mL/Hr) IV Continuous <Continuous>  dextrose 5%. 1000 milliLiter(s) (100 mL/Hr) IV Continuous <Continuous>  dextrose 5%. 1000 milliLiter(s) (100 mL/Hr) IV Continuous <Continuous>  dextrose 5%. 1000 milliLiter(s) (50 mL/Hr) IV Continuous <Continuous>  dextrose 50% Injectable 25 Gram(s) IV Push once  dextrose 50% Injectable 12.5 Gram(s) IV Push once  dextrose 50% Injectable 25 Gram(s) IV Push once  diclofenac sodium 1% Gel 2 Gram(s) Topical every 6 hours  escitalopram 10 milliGRAM(s) Oral <User Schedule>  fentaNYL   Patch  12 MICROgram(s)/Hr 1 Patch Transdermal every 72 hours  fentaNYL   Patch  25 MICROgram(s)/Hr 1 Patch Transdermal every 72 hours  gabapentin Solution 250 milliGRAM(s) Oral <User Schedule>  glucagon  Injectable 1 milliGRAM(s) IntraMuscular once  glucagon  Injectable 1 milliGRAM(s) IntraMuscular once  hemorrhoidal Ointment 1 Application(s) Rectal at bedtime  influenza  Vaccine (HIGH DOSE) 0.5 milliLiter(s) IntraMuscular once  insulin glargine Injectable (LANTUS) 10 Unit(s) SubCutaneous <User Schedule>  insulin lispro (ADMELOG) corrective regimen sliding scale   SubCutaneous every 6 hours  lactobacillus acidophilus 1 Tablet(s) Oral <User Schedule>  levothyroxine 125 MICROGram(s) Oral <User Schedule>  lidocaine   4% Patch 4 Patch Transdermal every 24 hours  loperamide Liquid 2 milliGRAM(s) Enteral Tube every 12 hours  melatonin 12 milliGRAM(s) Oral <User Schedule>  nystatin Powder 1 Application(s) Topical every 8 hours  pantoprazole  Injectable 40 milliGRAM(s) IV Push every 12 hours  predniSONE  Solution 5 milliGRAM(s) Enteral Tube <User Schedule>  QUEtiapine 12.5 milliGRAM(s) Oral <User Schedule>  sodium bicarbonate  Injectable 50 milliEquivalent(s) IV Push once  sodium chloride 1 Gram(s) Oral every 12 hours  sodium chloride 0.65% Nasal 1 Spray(s) Both Nostrils every 6 hours  Viokace Flush 1 Tablet(s) 1 Tablet(s) Enteral Tube once  witch hazel Pads 1 Application(s) Topical every 12 hours    MEDICATIONS  (PRN):  aluminum hydroxide/magnesium hydroxide/simethicone Suspension 30 milliLiter(s) Enteral Tube every 4 hours PRN Dyspepsia  oxyCODONE    IR 2.5 milliGRAM(s) Oral every 6 hours PRN Severe Pain (7 - 10)      LABS:                        9.2    14.86 )-----------( 158      ( 01 Nov 2024 06:15 )             30.7     11-01    133[L]  |  99  |  10  ----------------------------<  118[H]  3.7   |  22  |  <0.30[L]    Ca    7.7[L]      01 Nov 2024 06:15  Phos  3.4     11-01  Mg     1.5     11-01    TPro  5.9[L]  /  Alb  2.0[L]  /  TBili  0.6  /  DBili  x   /  AST  29  /  ALT  17  /  AlkPhos  109  11-01      Urinalysis Basic - ( 01 Nov 2024 06:15 )    Color: x / Appearance: x / SG: x / pH: x  Gluc: 118 mg/dL / Ketone: x  / Bili: x / Urobili: x   Blood: x / Protein: x / Nitrite: x   Leuk Esterase: x / RBC: x / WBC x   Sq Epi: x / Non Sq Epi: x / Bacteria: x          CAPILLARY BLOOD GLUCOSE    MICROBIOLOGY:     RADIOLOGY:  [ ] Reviewed and interpreted by me    Mode: AC/ CMV (Assist Control/ Continuous Mandatory Ventilation)  RR (machine): 18  TV (machine): 350  FiO2: 30  PEEP: 5  ITime: 1  MAP: 10  PIP: 30

## 2024-11-01 NOTE — PROVIDER CONTACT NOTE (HYPOGLYCEMIA EVENT) - NS PROVIDER CONTACT BACKGROUND-HYPO
Age: 74y    Gender: Female    POCT Blood Glucose:  147 mg/dL (10-31-24 @ 00:54)  62 mg/dL (10-31-24 @ 00:06)  63 mg/dL (10-31-24 @ 00:00)  210 mg/dL (10-30-24 @ 17:33)  188 mg/dL (10-30-24 @ 12:01)  121 mg/dL (10-30-24 @ 05:31)      eMAR:  dextrose 50% Injectable   12.5 Gram(s) IV Push (10-31-24 @ 00:35)    insulin glargine Injectable (LANTUS)   18 Unit(s) SubCutaneous (10-30-24 @ 06:12)    insulin lispro (ADMELOG) corrective regimen sliding scale   2 Unit(s) SubCutaneous (10-30-24 @ 18:21)   1 Unit(s) SubCutaneous (10-30-24 @ 12:40)    insulin lispro Injectable (ADMELOG)   28 Unit(s) SubCutaneous (10-30-24 @ 12:39)    insulin lispro Injectable (ADMELOG)   18 Unit(s) SubCutaneous (10-30-24 @ 06:12)    insulin lispro Injectable (ADMELOG)   13 Unit(s) SubCutaneous (10-30-24 @ 18:22)    levothyroxine   125 MICROGram(s) Oral (10-30-24 @ 04:04)    predniSONE  Solution   5 milliGRAM(s) Enteral Tube (10-30-24 @ 09:22)    
Age: 74y    Gender: Female    POCT Blood Glucose:  150 mg/dL (11-01-24 @ 00:16)  66 mg/dL (10-31-24 @ 23:47)  79 mg/dL (10-31-24 @ 23:45)  96 mg/dL (10-31-24 @ 17:33)  79 mg/dL (10-31-24 @ 16:42)  61 mg/dL (10-31-24 @ 15:45)  102 mg/dL (10-31-24 @ 14:46)  173 mg/dL (10-31-24 @ 13:33)      eMAR:  dextrose 50% Injectable   25 milliLiter(s) IV Push (10-31-24 @ 16:01)    dextrose 50% Injectable   12.5 Gram(s) IV Push (10-31-24 @ 16:55)    dextrose 50% Injectable   12.5 Gram(s) IV Push (11-01-24 @ 00:00)    insulin glargine Injectable (LANTUS)   10 Unit(s) SubCutaneous (10-31-24 @ 06:40)    insulin lispro (ADMELOG) corrective regimen sliding scale   1 Unit(s) SubCutaneous (10-31-24 @ 12:31)    insulin lispro Injectable (ADMELOG)   30 Unit(s) SubCutaneous (10-31-24 @ 12:30)    insulin lispro Injectable (ADMELOG)   18 Unit(s) SubCutaneous (10-31-24 @ 06:39)    levothyroxine   125 MICROGram(s) Oral (10-31-24 @ 04:08)    predniSONE  Solution   5 milliGRAM(s) Enteral Tube (10-31-24 @ 09:26)    
Age: 74y    Gender: Female    POCT Blood Glucose:  113 mg/dL (10-24-24 @ 06:32)  194 mg/dL (10-24-24 @ 05:38)  60 mg/dL (10-24-24 @ 05:10)  59 mg/dL (10-24-24 @ 05:08)  274 mg/dL (10-23-24 @ 23:26)  391 mg/dL (10-23-24 @ 17:22)  431 mg/dL (10-23-24 @ 14:47)  360 mg/dL (10-23-24 @ 11:52)      eMAR:  dextrose 50% Injectable   12.5 Gram(s) IV Push (10-24-24 @ 05:20)    insulin glargine Injectable (LANTUS)   25 Unit(s) SubCutaneous (10-23-24 @ 18:06)    insulin lispro (ADMELOG) corrective regimen sliding scale   6 Unit(s) SubCutaneous (10-23-24 @ 23:35)   10 Unit(s) SubCutaneous (10-23-24 @ 17:56)   10 Unit(s) SubCutaneous (10-23-24 @ 12:15)    insulin lispro Injectable (ADMELOG)   12 Unit(s) SubCutaneous (10-23-24 @ 12:16)    levothyroxine   125 MICROGram(s) Oral (10-24-24 @ 05:01)    predniSONE   Tablet   5 milliGRAM(s) Oral (10-23-24 @ 10:20)

## 2024-11-01 NOTE — PROGRESS NOTE ADULT - SUBJECTIVE AND OBJECTIVE BOX
Follow Up:  new fever    Interval History/ROS:  sluggish, some loose stool noted, not much in resp secretions    Allergies  walnut (Unknown)  metronidazole (Rash)  Lyrica (Unknown)  penicillin (Unknown)  Pineapple (Unknown)  Tagamet (Unknown)  heparin (Unknown)  Pecans (Unknown)  Hazelnut (Unknown)  meropenem (Rash)    ANTIMICROBIALS:  vancomycin    Solution 125 every 6 hours      OTHER MEDS:  MEDICATIONS  (STANDING):  albuterol/ipratropium for Nebulization 3 every 6 hours  aluminum hydroxide/magnesium hydroxide/simethicone Suspension 30 every 4 hours PRN  dextrose 50% Injectable 25 once  dextrose 50% Injectable 25 once  dextrose 50% Injectable 12.5 once  escitalopram 10 <User Schedule>  fentaNYL   Patch  12 MICROgram(s)/Hr 1 every 72 hours  fentaNYL   Patch  25 MICROgram(s)/Hr 1 every 72 hours  gabapentin Solution 250 <User Schedule>  glucagon  Injectable 1 once  glucagon  Injectable 1 once  influenza  Vaccine (HIGH DOSE) 0.5 once  insulin lispro (ADMELOG) corrective regimen sliding scale  every 6 hours  levothyroxine 125 <User Schedule>  melatonin 12 <User Schedule>  oxyCODONE    Solution 2.5 every 6 hours PRN  pantoprazole  Injectable 40 every 12 hours  predniSONE  Solution 5 <User Schedule>  QUEtiapine 12.5 <User Schedule>      Vital Signs Last 24 Hrs  T(C): 36.6 (01 Nov 2024 17:57), Max: 39 (01 Nov 2024 06:25)  T(F): 97.9 (01 Nov 2024 17:57), Max: 102.2 (01 Nov 2024 06:25)  HR: 74 (01 Nov 2024 17:57) (74 - 104)  BP: 139/61 (01 Nov 2024 17:57) (95/41 - 172/77)  BP(mean): --  RR: 18 (01 Nov 2024 17:57) (18 - 18)  SpO2: 100% (01 Nov 2024 17:57) (99% - 100%)    Parameters below as of 01 Nov 2024 17:57  Patient On (Oxygen Delivery Method): ventilator        PHYSICAL EXAM:  General: chronically ill appearing  Neurology: sleepy rouses  Respiratory: Clear to auscultation bilaterally  CV: RRR, S1S2, no murmurs, rubs or gallops  Abdominal: Soft, Non-tender, distended  Extremities: generalized mild edema  Line Sites: Clear  Skin: No rash                        9.2    14.86 )-----------( 158      ( 01 Nov 2024 06:15 )             30.7   WBC Count: 14.86 (11-01 @ 06:15)  WBC Count: 11.31 (10-31 @ 08:21)  WBC Count: 11.06 (10-30 @ 09:42)  WBC Count: 12.47 (10-30 @ 06:36)  WBC Count: 14.19 (10-29 @ 15:02)  WBC Count: 11.46 (10-28 @ 13:05)  WBC Count: 12.59 (10-28 @ 10:20)      11-01    133[L]  |  99  |  10  ----------------------------<  118[H]  3.7   |  22  |  <0.30[L]    Ca    7.7[L]      01 Nov 2024 06:15  Phos  3.4     11-01  Mg     1.5     11-01    TPro  5.9[L]  /  Alb  2.0[L]  /  TBili  0.6  /  DBili  x   /  AST  29  /  ALT  17  /  AlkPhos  109  11-01      MICROBIOLOGY:  Trach Asp Tracheal Aspirate  10-27-24   Moderate Pseudomonas aeruginosa  Moderate Pseudomonas aeruginosa #2  Multiple Morphological Strains  Commensal vinay consistent with body site  --  Pseudomonas aeruginosa  Pseudomonas aeruginosa      .Blood BLOOD  10-27-24   No growth at 4 days  --  --      .Blood BLOOD  10-17-24   No growth at 5 days  --  --      .Blood BLOOD  10-17-24   No growth at 5 days  --  --      .Blood BLOOD  10-15-24   No growth at 5 days  --  --      Trach Asp Tracheal Aspirate  10-15-24   Mixed gram negative rods including  Numerous Pseudomonas aeruginosa  Commensal vinay consistent with body site  --  Pseudomonas aeruginosa      .Blood BLOOD  10-15-24   No growth at 5 days  --  --      Trach Asp Tracheal Aspirate  10-08-24   Few Pseudomonas aeruginosa  Commensal vinay consistent with body site  --  Pseudomonas aeruginosa      Clean Catch Clean Catch (Midstream)  10-07-24   10,000 - 49,000 CFU/mL Escherichia coli ESBL  --  Escherichia coli ESBL      .Blood BLOOD  10-07-24   No growth at 5 days  --  --      .Blood BLOOD  10-07-24   No growth at 5 days  --  --      Clean Catch Clean Catch (Midstream)  10-03-24   Culture grew 3 or more types of organisms which indicate  collection contamination; consider recollection only if clinically  indicated.  --  -    Clostridium difficile GDH Toxins A&amp;B, EIA:   Positive (11-01-24 @ 13:44)  Clostridium difficile GDH Interpretation: Positive for toxigenic C. Difficile.  This specimen is positive for C.  Difficile glutamate dehydrogenase (GDH) antigen and positive for C.  Difficile Toxins A & B, by EIA.  . (11-01-24 @ 13:44)    RADIOLOGY:  < from: Xray Chest 1 View- PORTABLE-Urgent (Xray Chest 1 View- PORTABLE-Urgent .) (11.01.24 @ 11:28) >  FINDINGS:  Tracheostomy tube within the trachea above the nick  The heart is normal in size.  Bilateral patchy opacities decreased in size compared to prior.  There is no pneumothorax.  Trace bilateral pleural effusions decreased in size compared to prior.    IMPRESSION:  Improved aeration of bilateral lung fields, with interval decrease in   size of bilateral pleural effusions.    < end of copied text >      Zion Newman MD; Division of Infectious Disease; Pager: [phone]; nights and weekends: [phone]

## 2024-11-01 NOTE — PROGRESS NOTE ADULT - ASSESSMENT
75 yo F PMH T2DM on insulin, HTN, HLD, hypothyroidism, RA on Prednisone, Fibromyalgia, cerebral aneurysm s/p repair, chronic hypercapnic respiratory failure s/p trach (vent dependent) and chronic PEG 2022, recurrent C. diff infection (follows with Dr. 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  Pt also noted to have rectal bleeding this past week requiring transfusion 5 days ago in ED as per daughter. Daughter also reports brownish discharge from G-J tube. In ED, pt afebrile, fiO2 96-98% on 40% on ventilator.  Chest X-ray w/ opacification of right lung concerning for possible PNA.  Admitted to RCU for further management. Sputum cxs grew PSA; treated with course of Cefepime. Patient with decreased H+H received 1 unit of PRBCS on 10/10.  10/14 EGD w/ Dr. Rosales for PEGJ exchange and clippings placed for closure of fistula site.  Persistent fevers treated with meropenem and vanc (d/c'd 10/20).  CT A/P without infectious source.  Continue airway management with duonebs, chest PT and suction PRN.        10/30: Patient given 1 unit of PRBCs today hgb 6.8. Dr. Myah Coleman called for consult. Patient seen by Optho recommended discontinuation of prednisolone gtts and initiating artificial tears. RT Dislodged yesterday remains with frequent stooling but not diarrhea. Will resume Imodium 2mg q 12hrs. Continue to monitor Gastric output improved today.    10/31: Hgb over responded to 1 unit.  No signs of bleeding observed, stool remains brown.  Hematology consult noted.  J-port again obstructed distally.  Attempted gingerale and sodium bicarb flush but unable to unclog.  GI, Dr. Rosales, notified.  Tube feeding study for tube position performed.  NPO for now, D5NS until glucose stabilizes given insulin still in system. 73 yo F PMH T2DM on insulin, HTN, HLD, hypothyroidism, RA on Prednisone, Fibromyalgia, cerebral aneurysm s/p repair, chronic hypercapnic respiratory failure s/p trach (vent dependent) and chronic PEG 2022, recurrent C. diff infection (follows with Dr. 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  Pt also noted to have rectal bleeding this past week requiring transfusion 5 days ago in ED as per daughter. Daughter also reports brownish discharge from G-J tube. In ED, pt afebrile, fiO2 96-98% on 40% on ventilator.  Chest X-ray w/ opacification of right lung concerning for possible PNA.  Admitted to RCU for further management. Sputum cxs grew PSA; treated with course of Cefepime. Patient with decreased H+H received 1 unit of PRBCS on 10/10.  10/14 EGD w/ Dr. Rosales for PEGJ exchange and clippings placed for closure of fistula site.  Persistent fevers treated with meropenem and vanc (d/c'd 10/20).  CT A/P without infectious source.  Continue airway management with duonebs, chest PT and suction PRN.        10/30: Patient given 1 unit of PRBCs today hgb 6.8. Dr. Myah Coleman called for consult. Patient seen by Optho recommended discontinuation of prednisolone gtts and initiating artificial tears. RT Dislodged yesterday remains with frequent stooling but not diarrhea. Will resume Imodium 2mg q 12hrs. Continue to monitor Gastric output improved today.    10/31: Hgb over responded to 1 unit.  No signs of bleeding observed, stool remains brown.  Hematology consult noted.  J-port again obstructed distally.  Attempted gingerale and sodium bicarb flush but unable to unclog.  GI, Dr. Rosales, notified.  Tube feeding study for tube position performed.  NPO for now, D5NS until glucose stabilizes given insulin still in system.  11/1:  J-tube working this morning.  Likely distal clog dissolved over time given attempts made yesterday.  Feeds and medications resumed via J-port without issues during the day.  Fever of 102F over night.   Pan-cultured, Cdiff positive, started on Vancomycin PO.  As discussed with daughter, it is not advisable nor should be practiced to give medications via gravity via J-tube as it would in fact cause slow sedimentation to build on the slits of the opening eventually accumulating to the point of obstruction.  Adequate gentle flushing  of medication followed by gentle water flush should be sufficient.  Thickening agents like banatrol should  be re-evaluated.

## 2024-11-01 NOTE — PROGRESS NOTE ADULT - PROBLEM SELECTOR PLAN 5
LDL goal <70 due to DM  Pt LDL NA  Order fasting lipid if not recently done  Statin as per primary team. Not getting statin at this time   Manage per primary team   F/u levels as out pt      Contact via [employer] Teams during business hours  To reach covering provider access [employer] via [employer] tools  For Urgent matters/after-hours/weekends/holidays please page endocrine fellow on call   For nonurgent matters please email [email or web address]    Please note that this patient may be followed by different provider tomorrow.  Notify endocrine 24 hours prior to discharge for final recommendations

## 2024-11-01 NOTE — PROGRESS NOTE ADULT - NS ATTEND AMEND GEN_ALL_CORE FT
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 Found to have Pseudomonas aeruginosa in sputum culture and urine culture with ESBL E. coli s/p course of meropenem. Course complicated by antibiotic-associated diarrhea.    Was having high stool output, now improved. But with increased gastric output. Abn Xray WNL.   Not pressure supporting well  Being monitored off abx.   Low h/h pending repeat. s/p 1 unit prbc with over correction    Having more diarrhea today. Recurrent fever of 102 last night. Cultures sent. Started on albumin. Awaiting ID recs.     # Diarrhea, hx of c.diff  # chronic hypoxemic respiratory failure  # oropharyngeal dysphagia  # Anemia  # T2 DM  # functional quadriplegia  # Gastrocutaneous fistula  # RA  # GJ tube malfunction  - C/W current pain regiment, well controlled. C/W escitalopram and seroquel  - continue lung protective ventilation. Pressure support trials as tolerates. Does poorly on PS trials. Continue Duonebs.. Unlikely to come off the vent.  - More euvolemic, third spacing 2/2 to low albumin and immobility.   - C/W loperimid, per ID repeat c.diff. Will order for next BM  - hx of MDRO, Id following. Will d/w as patient now having again high fever x1. Will give albumin.   - Anemia multifactoral, hemorrhoids, AOCD. s/p EGD without active bleeding.   - difficult to manage FSG, hypoglycemic today. F/U with endo recs.   -  RA, on home prednisone 5 mg daily. Hold off on Enbrel at this time  - GJ tube malfunction, now again work. CXR showing inplace. Will monitor.   - DVT ppx- on hold for blood transfusion requirements.   -Dispo: full code, prognosis guarded, discussed with patient and daughter Marysol, d/c planning.

## 2024-11-01 NOTE — PROGRESS NOTE ADULT - SUBJECTIVE AND OBJECTIVE BOX
Seen earlier today     Chief Complaint: Diabetes Mellitus follow up    INTERVAL HX: " Okay" Trached and on mechanical ventilation. She is back on tube feeding ([place] Peptide 1.5 (KFPEPT1.5RTH) at 80 ml/hr)  for 12 hours. TFs were restarted around 8:30 am today as per team. Noted overnight hypoglycemia which treated with D50%. Am  and  at noon.         Review of Systems:  General: As above  GI: No nausea, vomiting  Endocrine: no  S&Sx of hypoglycemia    Allergies    walnut (Unknown)  metronidazole (Rash)  Lyrica (Unknown)  penicillin (Unknown)  Pineapple (Unknown)  Tagamet (Unknown)  heparin (Unknown)  Pecans (Unknown)  Hazelnut (Unknown)  meropenem (Rash)    Intolerances      MEDICATIONS  (STANDING):  albumin human 25% IVPB 50 milliLiter(s) IV Intermittent every 8 hours  albuterol/ipratropium for Nebulization 3 milliLiter(s) Nebulizer every 6 hours  artificial  tears Solution 1 Drop(s) Both EYES every 6 hours  artificial tears (preservative free) Ophthalmic Solution 1 Drop(s) Both EYES four times a day  ascorbic acid 500 milliGRAM(s) Oral daily  Biotene Dry Mouth Oral Rinse 5 milliLiter(s) Swish and Spit every 6 hours  calcium carbonate 1250 mG  + Vitamin D (OsCal 500 + D) 1 Tablet(s) Oral <User Schedule>  chlorhexidine 0.12% Liquid 15 milliLiter(s) Oral Mucosa every 12 hours  chlorhexidine 4% Liquid 1 Application(s) Topical daily  dextrose 5%. 1000 milliLiter(s) (100 mL/Hr) IV Continuous <Continuous>  dextrose 5%. 1000 milliLiter(s) (50 mL/Hr) IV Continuous <Continuous>  dextrose 5%. 1000 milliLiter(s) (100 mL/Hr) IV Continuous <Continuous>  dextrose 5%. 1000 milliLiter(s) (50 mL/Hr) IV Continuous <Continuous>  dextrose 50% Injectable 25 Gram(s) IV Push once  dextrose 50% Injectable 25 Gram(s) IV Push once  dextrose 50% Injectable 12.5 Gram(s) IV Push once  diclofenac sodium 1% Gel 2 Gram(s) Topical every 6 hours  escitalopram 10 milliGRAM(s) Oral <User Schedule>  fentaNYL   Patch  12 MICROgram(s)/Hr 1 Patch Transdermal every 72 hours  fentaNYL   Patch  25 MICROgram(s)/Hr 1 Patch Transdermal every 72 hours  gabapentin Solution 250 milliGRAM(s) Oral <User Schedule>  glucagon  Injectable 1 milliGRAM(s) IntraMuscular once  glucagon  Injectable 1 milliGRAM(s) IntraMuscular once  hemorrhoidal Ointment 1 Application(s) Rectal at bedtime  influenza  Vaccine (HIGH DOSE) 0.5 milliLiter(s) IntraMuscular once  insulin glargine Injectable (LANTUS) 10 Unit(s) SubCutaneous <User Schedule>  insulin lispro (ADMELOG) corrective regimen sliding scale   SubCutaneous every 6 hours  insulin lispro Injectable (ADMELOG) 25 Unit(s) SubCutaneous <User Schedule>  lactobacillus acidophilus 1 Tablet(s) Oral <User Schedule>  levothyroxine 125 MICROGram(s) Oral <User Schedule>  lidocaine   4% Patch 4 Patch Transdermal every 24 hours  loperamide Liquid 2 milliGRAM(s) Enteral Tube every 12 hours  melatonin 12 milliGRAM(s) Oral <User Schedule>  nystatin Powder 1 Application(s) Topical every 8 hours  pantoprazole  Injectable 40 milliGRAM(s) IV Push every 12 hours  predniSONE  Solution 5 milliGRAM(s) Enteral Tube <User Schedule>  QUEtiapine 12.5 milliGRAM(s) Oral <User Schedule>  sodium chloride 1 Gram(s) Oral every 12 hours  sodium chloride 0.65% Nasal 1 Spray(s) Both Nostrils every 6 hours  witch hazel Pads 1 Application(s) Topical every 12 hours        dextrose 50% Injectable   12.5 Gram(s) IV Push (10-31-24 @ 16:55)    dextrose 50% Injectable   25 milliLiter(s) IV Push (10-31-24 @ 16:01)    dextrose 50% Injectable   12.5 Gram(s) IV Push (11-01-24 @ 00:00)    insulin glargine Injectable (LANTUS)   10 Unit(s) SubCutaneous (11-01-24 @ 05:52)    insulin lispro (ADMELOG) corrective regimen sliding scale   4 Unit(s) SubCutaneous (11-01-24 @ 12:25)    insulin lispro Injectable (ADMELOG)   25 Unit(s) SubCutaneous (11-01-24 @ 12:25)    levothyroxine   125 MICROGram(s) Oral (11-01-24 @ 05:50)    predniSONE  Solution   5 milliGRAM(s) Enteral Tube (11-01-24 @ 12:18)        PHYSICAL EXAM:  VITALS: T(C): 36.6 (11-01-24 @ 11:00)  T(F): 97.9 (11-01-24 @ 11:00), Max: 102.2 (11-01-24 @ 06:25)  HR: 85 (11-01-24 @ 11:19) (76 - 104)  BP: 101/41 (11-01-24 @ 11:00) (95/41 - 172/77)  RR:  (18 - 18)  SpO2:  (99% - 100%)  Wt(kg): --  GENERAL:Female laying in bed,  in NAD  Respiratory: + Trach with mechanical ventilation   Extremities: Warm, no edema  NEURO: Alert , appropriate     LABS:  POCT Blood Glucose.: 305 mg/dL (11-01-24 @ 11:40)  POCT Blood Glucose.: 132 mg/dL (11-01-24 @ 05:33)  POCT Blood Glucose.: 150 mg/dL (11-01-24 @ 00:16)  POCT Blood Glucose.: 66 mg/dL (10-31-24 @ 23:47)  POCT Blood Glucose.: 79 mg/dL (10-31-24 @ 23:45)  POCT Blood Glucose.: 96 mg/dL (10-31-24 @ 17:33)  POCT Blood Glucose.: 79 mg/dL (10-31-24 @ 16:42)  POCT Blood Glucose.: 61 mg/dL (10-31-24 @ 15:45)  POCT Blood Glucose.: 102 mg/dL (10-31-24 @ 14:46)  POCT Blood Glucose.: 173 mg/dL (10-31-24 @ 13:33)  POCT Blood Glucose.: 210 mg/dL (10-31-24 @ 12:52)  POCT Blood Glucose.: 176 mg/dL (10-31-24 @ 11:47)  POCT Blood Glucose.: 120 mg/dL (10-31-24 @ 06:14)  POCT Blood Glucose.: 79 mg/dL (10-31-24 @ 05:12)  POCT Blood Glucose.: 69 mg/dL (10-31-24 @ 05:11)  POCT Blood Glucose.: 147 mg/dL (10-31-24 @ 00:54)  POCT Blood Glucose.: 62 mg/dL (10-31-24 @ 00:06)  POCT Blood Glucose.: 63 mg/dL (10-31-24 @ 00:00)  POCT Blood Glucose.: 210 mg/dL (10-30-24 @ 17:33)  POCT Blood Glucose.: 188 mg/dL (10-30-24 @ 12:01)  POCT Blood Glucose.: 121 mg/dL (10-30-24 @ 05:31)  POCT Blood Glucose.: 192 mg/dL (10-29-24 @ 23:16)  POCT Blood Glucose.: 214 mg/dL (10-29-24 @ 17:20)                          9.2    14.86 )-----------( 158      ( 01 Nov 2024 06:15 )             30.7     11-01    133[L]  |  99  |  10  ----------------------------<  118[H]  3.7   |  22  |  <0.30[L]    Ca    7.7[L]      01 Nov 2024 06:15  Phos  3.4     11-01  Mg     1.5     11-01    TPro  5.9[L]  /  Alb  2.0[L]  /  TBili  0.6  /  DBili  x   /  AST  29  /  ALT  17  /  AlkPhos  109  11-01    eGFR: 113 mL/min/1.73m2 (01 Nov 2024 06:15)      Thyroid Function Tests:  10-16 @ 06:50 TSH 1.74 FreeT4 1.2 T3 -- Anti TPO -- Anti Thyroglobulin Ab -- TSI --      A1C with Estimated Average Glucose Result: 5.8 % (10-14-24 @ 05:08)  A1C with Estimated Average Glucose Result: 6.4 % (08-12-24 @ 07:32)    Estimated Average Glucose: 120 mg/dL (10-14-24 @ 05:08)  Estimated Average Glucose: 137 mg/dL (08-12-24 @ 07:32)        Diet, NPO with Tube Feed:   Tube Feeding Modality: Jejunostomy  [place] Peptide 1.5 (KFPEPT1.5RTH)  Total Volume for 24 Hours (mL): 960  Continuous  Until Goal Tube Feed Rate (mL per Hour): 80  Tube Feed Duration (in Hours): 12  Tube Feed Start Time: 06:00  Free Water Flush  Pump   Rate (mL per Hour): 50   Frequency: Every Hour  Free Water Flush Instructions:  50ml free water flushes Q1hr  Alfred(7 Gm Arginine/7 Gm Glut/1.2 Gm HMB     Qty per Day:  2  No Carb Prosource (1pkg = 15gms Protein)     Qty per Day:  1  Banatrol TF     Qty per Day:  1/2 packet per daily (11-01-24 @ 08:28) [Active]

## 2024-11-01 NOTE — PROGRESS NOTE ADULT - ASSESSMENT
73 yo F w/h/o controlled T2DM (A1C 5.8%) on insulin at home. Also HTN, HLD, hypothyroidism, RA on Prednisone, Fibromyalgia, cerebral aneurysm s/p repair, chronic hypercapnic respiratory failure s/p trach (vent dependent) and chronic PEG 2022, recurrent C. diff infection (follows with Dr. 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 S/p antibiotics and s/p GJ tube exchanges on 10/14, s/p PEG replacement 10/21. Endocrine consulted for Type 2 DM management while on tube feeding.   She is back on tube feeding ([place] Peptide 1.5 (KFPEPT1.5RTH) at 80 ml/hr)  for 12 hours. TFs were restarted around 8:30 am today and tolerating it well as per team. Noted overnight hypoglycemia which treated with D50%. Am  and  at noon. Hyperglycemia while on tube feeding without standing insulin given. Will restart standing Admelog while on continuous tube feeding          Home DM medications: Lantus 35 units at HS, Humalog 26 units with # 1 feeding, 22 units with #2 tube feeding, 20 units with #3 tube feeding and  Humalog correction scale (  2 units for -909, 4 units for -250, 6 units for -300, 8units for -350, 10 units for -400, 12 units for -450)   while on [employer] formula 3 cans/day, slow bolus( run at 80 ml/hr total volume 960 ml/day> 1st can around 6:30-8:30, 2nd can around 3 pm, 3rd can around 8-9 pm) plus Banatrol 1/2 packet BID, was increasing to 1 packet BID, plus Kefir 10 oz/day ( divided in multiple times throughout the day).

## 2024-11-01 NOTE — PROGRESS NOTE ADULT - PROBLEM SELECTOR PLAN 1
- Please monitor blood glucose values q 6 hours while on tube feeding or NPO  - Increase Lantus to 14 units at 6 am.   - Restart Admelog 18 u at 0600 (if FBG <100s administer 9 units instead, Hold if TFs are on hold)  - Start Admelog  25 units at 1200 ( if BG < 100 give 15 units instead, hold if TFs are on hold)   - Discontinue 6pm standing admelog dose.   - Will change to Moderate dose Admelog correctional scale q6h while on TFs and overnight, change to low dose if holding Tube feeding   - Please keep hypoglycemia protocol in place  Discharge Plan:  - TBD depending on insulin requirement and discharge tube feeding regimen (Bolus vs continuous tube feeding)   - If bolus tube feeding > Lantus plus Humalog   - Patient should have BGs checked 4x a day while on TFs.    Daughter aware to contact Endocrinologist if BG <70mg/dL x1 or >200mg/dL consistently or >400mg/dL x1.   - Followup with Dr Ruth: [facility]: [address] [place] [address]. [address]. [place] [address]. Tel: [phone]- Telemedicine- daughter to schedule.   - Make sure pt has Rx for all DM supplies and insulin

## 2024-11-02 NOTE — PROGRESS NOTE ADULT - PROBLEM SELECTOR PLAN 3
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  No focal intra-abdominal/pelvic or subcutaneous collections.  - Occult 10/7: Positive  - Transfused on 10/10 with appropriate response in H+H  - Will transfuse additional unit today; Check AM CBC   - Heme called for consult 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  No focal intra-abdominal/pelvic or subcutaneous collections.  - Occult 10/7: Positive  - Transfused on 10/10 and 10/30 with appropriate response in H+H  - heme consult appreciated

## 2024-11-02 NOTE — PROGRESS NOTE ADULT - SUBJECTIVE AND OBJECTIVE BOX
Follow Up:  Cdiff, fevers    Interval History/ROS:  sleepy,  increased diarrhea    Allergies  walnut (Unknown)  metronidazole (Rash)  Lyrica (Unknown)  penicillin (Unknown)  Pineapple (Unknown)  Tagamet (Unknown)  heparin (Unknown)  Pecans (Unknown)  Hazelnut (Unknown)  meropenem (Rash)        ANTIMICROBIALS:  vancomycin    Solution 125 every 6 hours      OTHER MEDS:  MEDICATIONS  (STANDING):  albuterol/ipratropium for Nebulization 3 every 6 hours  aluminum hydroxide/magnesium hydroxide/simethicone Suspension 30 every 4 hours PRN  dextrose 50% Injectable 25 once  dextrose 50% Injectable 25 once  dextrose 50% Injectable 12.5 once  escitalopram 10 <User Schedule>  fentaNYL   Patch  12 MICROgram(s)/Hr 1 every 72 hours  fentaNYL   Patch  25 MICROgram(s)/Hr 1 every 72 hours  gabapentin Solution 250 <User Schedule>  glucagon  Injectable 1 once  glucagon  Injectable 1 once  influenza  Vaccine (HIGH DOSE) 0.5 once  insulin glargine Injectable (LANTUS) 14 <User Schedule>  insulin lispro (ADMELOG) corrective regimen sliding scale  every 6 hours  insulin lispro Injectable (ADMELOG) 18 <User Schedule>  insulin lispro Injectable (ADMELOG) 30 <User Schedule>  levothyroxine 125 <User Schedule>  melatonin 12 <User Schedule>  oxyCODONE    Solution 2.5 every 6 hours PRN  pantoprazole  Injectable 40 every 12 hours  predniSONE  Solution 5 <User Schedule>  QUEtiapine 12.5 <User Schedule>      Vital Signs Last 24 Hrs  T(C): 36.7 (02 Nov 2024 13:30), Max: 38.6 (02 Nov 2024 12:31)  T(F): 98 (02 Nov 2024 13:30), Max: 101.4 (02 Nov 2024 12:31)  HR: 108 (02 Nov 2024 12:02) (74 - 108)  BP: 104/58 (02 Nov 2024 12:02) (104/58 - 151/60)  BP(mean): --  RR: 18 (02 Nov 2024 05:16) (18 - 18)  SpO2: 100% (02 Nov 2024 12:02) (98% - 100%)    Parameters below as of 02 Nov 2024 12:02  Patient On (Oxygen Delivery Method): ventilator        PHYSICAL EXAM:  General: chronically ill appearing  Neurology: lethargic  Respiratory: Clear to auscultation bilaterally  CV: RRR, S1S2, no murmurs, rubs or gallops  Abdominal: Soft, Non-tender, non-distended  Extremities: generalized edema  Line Sites: Clear  Skin: No rash                          9.4    15.69 )-----------( 139      ( 02 Nov 2024 10:09 )             32.6   WBC Count: 15.69 (11-02 @ 10:09)  WBC Count: 14.86 (11-01 @ 06:15)  WBC Count: 11.31 (10-31 @ 08:21)  WBC Count: 11.06 (10-30 @ 09:42)  WBC Count: 12.47 (10-30 @ 06:36)  WBC Count: 14.19 (10-29 @ 15:02)    11-02    131[L]  |  100  |  17  ----------------------------<  114[H]  4.4   |  18[L]  |  <0.30[L]    Ca    8.0[L]      02 Nov 2024 06:56  Phos  3.2     11-02  Mg     1.7     11-02    TPro  6.2  /  Alb  2.5[L]  /  TBili  0.6  /  DBili  x   /  AST  19  /  ALT  14  /  AlkPhos  98  11-02    Urinalysis (11.01.24 @ 13:44)   Glucose Qualitative, Urine: Negative mg/dL  Blood, Urine: Negative  pH Urine: 5.0  Color: Dark Yellow  Urine Appearance: Clear  Bilirubin: Small  Ketone - Urine: Negative mg/dL  Specific Gravity: 1.019  Protein, Urine: Trace mg/dL  Urobilinogen: 0.2 mg/dL  Nitrite: Negative  Leukocyte Esterase Concentration: Negative        MICROBIOLOGY:  .Blood BLOOD  11-01-24   No growth at 24 hours  --  --      Trach Asp Tracheal Aspirate  10-27-24   Moderate Pseudomonas aeruginosa  Moderate Pseudomonas aeruginosa #2  Multiple Morphological Strains  Commensal vinay consistent with body site  --  Pseudomonas aeruginosa  Pseudomonas aeruginosa      .Blood BLOOD  10-27-24   No growth at 5 days  --  --      .Blood BLOOD  10-17-24   No growth at 5 days  --  --      .Blood BLOOD  10-17-24   No growth at 5 days  --  --      .Blood BLOOD  10-15-24   No growth at 5 days  --  --      Trach Asp Tracheal Aspirate  10-15-24   Mixed gram negative rods including  Numerous Pseudomonas aeruginosa  Commensal vinay consistent with body site  --  Pseudomonas aeruginosa      .Blood BLOOD  10-15-24   No growth at 5 days  --  --      Trach Asp Tracheal Aspirate  10-08-24   Few Pseudomonas aeruginosa  Commensal vinay consistent with body site  --  Pseudomonas aeruginosa      Clean Catch Clean Catch (Midstream)  10-07-24   10,000 - 49,000 CFU/mL Escherichia coli ESBL  --  Escherichia coli ESBL      .Blood BLOOD  10-07-24   No growth at 5 days  --  --      .Blood BLOOD  10-07-24   No growth at 5 days  --  --    Clostridium difficile GDH Toxins A&amp;B, EIA:   Positive (11-01-24 @ 13:44)  Clostridium difficile GDH Interpretation: Positive for toxigenic C. Difficile.  This specimen is positive for C.  Difficile glutamate dehydrogenase (GDH) antigen and positive for C.  Difficile Toxins A & B, by EIA.   (11-01-24 @ 13:44)      RADIOLOGY:  < from: Xray Abdomen 1 View PORTABLE -Routine (Xray Abdomen 1 View PORTABLE -Routine .) (11.01.24 @ 19:54) >  Findings:  Gastrojejunostomy tube unchanged in position.  No oral contrast was injected.  There is a nonobstructive bowel gas pattern.  No free intraperitoneal air.    IMPRESSION:  Gastrojejunostomy tube in place.  Nonobstructive bowel gas pattern.    < end of copied text >      Zion Newman MD; Division of Infectious Disease; Pager: [phone]; nights and weekends: [phone]

## 2024-11-02 NOTE — PROGRESS NOTE ADULT - PROBLEM SELECTOR PLAN 7
- Continue Standing/ Breakthrough Tylenol   - Continue Gabapentin / PRN OXY  - Continue Fentanyl patches ( Renew 11/4) - Continue Standing/ Breakthrough Tylenol   - Continue Gabapentin / PRN OXY  - Continue Fentanyl patches (Renew 11/4)

## 2024-11-02 NOTE — PROGRESS NOTE ADULT - NS ATTEND AMEND GEN_ALL_CORE FT
agree with above  ID f/u appreciated. now on vanco for cdiff  afebrile overnight and today  vent dependent

## 2024-11-02 NOTE — PROGRESS NOTE ADULT - PROBLEM SELECTOR PLAN 2
[] PNA  - CXR 10/7: Opacification of the right middle lobe may represent pneumonia versus atelectasis  - sputum culture 10/8 PSA  - completed course of cefepime on 10/12  - CT A/P 10/18: ?multifocal pneumonia, presence of gastrocutaneous fistula; otherwise no collections/abscess noted  - Txd with meropenem 10/15-10/23; d/c'd for persistent diarrhea  - Currently remains afebrile off abx [] PNA  - CXR 10/7: Opacification of the right middle lobe may represent pneumonia versus atelectasis  - sputum culture 10/8 PSA  - completed course of cefepime on 10/12  - CT A/P 10/18: ?multifocal pneumonia, presence of gastrocutaneous fistula; otherwise no collections/abscess noted  - Txd with meropenem 10/15-10/23; d/c'd for persistent diarrhea  - Currently remains afebrile off abx    #fevers  - cdiff + 11/1   - po vanco

## 2024-11-02 NOTE — PROGRESS NOTE ADULT - ASSESSMENT
74-year-old female with prolonged admission. Has anemia and that is an AOCD process due to concurrent medical issues. Hgb relatively stable and no need for a transfusion at this time.    Thrombocytopenia has been mild and on and off during this admission and prior. Could be ITP. Could be due to medications, i.e. Seroquel, etc. Cannot rule out other possible causes. Just monitor for now.

## 2024-11-02 NOTE — PROGRESS NOTE ADULT - ASSESSMENT
73 yo woman PMH T2DM on insulin, HTN, HLD, hypothyroidism, RA on Prednisone, Fibromyalgia, cerebral aneurysm s/p repair, chronic hypercapnic respiratory failure s/p trach (vent dependent) and chronic PEG 2022, recurrent C. diff infection , persistent GJ tube leaks now s/p GC fistula repair 8/12  admitted 10/7/24 with resp distress and found to have RML opacity     Antibiotics  Ceftriaxone 10/7  Azithro 10/7  Cefepime 10/7--> 10/12  meropenem 10/15 --> 10/23  IV Vanco 10/17 --> 10/21  Vanco via NGT 10/24; 11/1-->      10/10 melena, anemia, blood tx  10/14 EGD for GJ exchange and piror PEG site closure  One benign-appearing, intrinsic moderate stenosis was found in the upper third of the        esophagus. The stenosis was traversed.       The cardia and gastric fundus were normal.       A gastric tube with tip in the jejunum was found in the gastric body. The PEG required        removal because it was leaking. The J tube extension (12F) was removed first. An externally        removable 24 Fr EndoVive Safety gastrostomy tube was lubricated. The guide wire was passed        through the existing G-tube port and snared endoscopically. The endoscope and snare were then        removed, pulling the wire out through the mouth. The g-tube was tied to the guidewire, pulled        through the mouth into the stomach and then pulled out from the stomach through the skin. The        bumper was attached to the gastrostomy tube. The feeding tube was then cut to an appropriate        length. The final position of the gastrostomy tube was confirmed by relook endoscopy, and        skin marking noted to be 3 cm at the external bumper. The final tension and compression of        the abdominal wall by the PEG tube and external bumper were checked and revealed that the        bumper was moderately tight and mildly deforming the skin. The J tube extension (12 F        EndoVive Jejunal feeding tube) was advanced into the stomach. The tip of the J tube had a        loop thread that was captured with a Resolution clip. The thread and the J tube extension        were advanced to the proximal jejunum, and the endoclip along with the tip of the J tube was        attached to the wall securely. The J tube extension was capped, and the tube site was cleaned        and dressed.       A small fistula was found on the anterior wall of the gastric body related to prior G tube        site. Coagulation of tissue near the fistula using argon plasma at 1.2 liters/minute and 35        peraza was successful. To closure the gastrocutaneous fistula, four hemostatic clips were        successfully placed (MR conditional, two 16 mm DuraClip and 2 Mantis clips.       The examined duodenum was normal.    10/14, 10/15 Fever, transient hypoxia post procedure  10/15 ProCalcitonin = 8.48 suggestive of bacterial process; BCx x2 NGTD; Trach: Pseudomonas   - though benign mg stain  10;16 Leukocytosis WBC = 14.26  10/17 fever, hypotension, abd distension, no diarrhea noted this am WBC = 15.02; Rising Procalcitonin = 38.49; Obstructed J tube   10/18  lost IV access re-gained  - CT scan late this afternoon  WBC = 8.68; Rising Procalcitonin >100  10/18 imaging suggests multifocal pneumonia  10/21 improved chest exam, Procalcitonin level considerably decreased, decreased FiO2  - afebrile since 10/18  10/21 UGI endoscopy done  Findings:       The esophagus was normal. A fistula was found on the anterior wall of the gastric body.       There was evidence of a gastrostomy present in the gastric body. The patient was placed in        the supine position. The endoscope was advanced to the jejunum and the prior placed J tube        with loop attached to the wall and the loop thread was cut by endoclip. The J tube and the G        tube were removed. The gastrostomy fistula was utilized and an Avanos 22 F        Gastrostomy/Jejunal tube was advanced. The jejunal tip was advanced through the pylorus and        into the duodenum. The endoscope was then advanced to the duodenum and the loop thread at the        tip of the J tube was captured and advanced to the proximal jejunum. The loop thread was        clipped to wall by a Avnera Resolution clip. The J tube flushed easily and the G        tube decompress the abdomen easily. The internal balloon was insufflated with 10 cc of water        to hold the GJ tube in place. The outside marking was at 3.  10/23  no distress, liquid stools noted  - Meropenem discontinued  GI PCR NEG  10/24  persistent diarrhea  Cdiff NEG; enteral vanco stopped and Immodium provided  10/28 low grade temps, mild leukocytosis  10/30 blood transfusion  11/1  fever  +Cdiff      Issues  cerebral aneurysm s/p repair  chronic hypercapnic respiratory failure s/p trach (vent dependent) and chronic PEG 2022  anemia  - had iron deficiency  Pseudomonas aeruginosa upper airway colonization  (most recent isolate Resist to Cipro/Levo)  previous Cdiff  T2DM on insulin  HTN  HLD  hypothyroidism  RA on Prednisone  Fibromyalgia  debility  sacral ulcer largely healed  malnourished/chronic catobolic state      Suggest  Continue vanco 125 q 6 hours via J tube 11/1 -->   minimize other antibiotics unless clear clinical indication of infection    if patient improves and able to be discharged, I will consider Bezlotoxumab (Zinplava) 500 mg over 1 hour IV at  home to prevent Cdiff recurrence      discussed with RCU team - rectal tube to be reinserted

## 2024-11-02 NOTE — PROGRESS NOTE ADULT - SUBJECTIVE AND OBJECTIVE BOX
Patient is a 74y old  Female who presents with a chief complaint of Patient admitted with Hypoxemia and episode of Bright red blood per rectum (2024 18:14)      Interval Events:    REVIEW OF SYSTEMS:  [ ] Positive  [ ] All other systems negative  [ ] Unable to assess ROS because ________    Vital Signs Last 24 Hrs  T(C): 36.2 (24 @ 05:16), Max: 37.8 (24 @ 07:53)  T(F): 97.2 (24 @ 05:16), Max: 100 (24 @ 07:53)  HR: 82 (24 @ 06:11) (74 - 98)  BP: 114/78 (24 @ 05:16) (95/41 - 151/60)  RR: 18 (24 @ 05:16) (18 - 18)  SpO2: 100% (24 @ 06:11) (99% - 100%)    PHYSICAL EXAM:  HEENT:   [ ]Tracheostomy:  [ ]Pupils equal  [ ]No oral lesions  [ ]Abnormal    SKIN  [ ]No Rash  [ ] Abnormal  [ ] pressure    CARDIAC  [ ]Regular  [ ]Abnormal    PULMONARY  [ ]Bilateral Clear Breath Sounds  [ ]Normal Excursion  [ ]Abnormal    GI  [ ]PEG      [ ] +BS		              [ ]Soft, nondistended, nontender	  [ ]Abnormal    MUSCULOSKELETAL                                   [ ]Bedbound                 [ ]Abnormal    [ ]Ambulatory/OOB to chair                           EXTREMITIES                                         [ ]Normal  [ ]Edema                           NEUROLOGIC  [ ] Normal, non focal  [ ] Focal findings:    PSYCHIATRIC  [ ]Alert and appropriate  [ ] Sedated	 [ ]Agitated    :  Mcbride: [ ] Yes, if yes: Date of Placement:                   [  ] No    LINES: Central Lines [ ] Yes, if yes: Date of Placement                                     [  ] No    HOSPITAL MEDICATIONS:  MEDICATIONS  (STANDING):  albuterol/ipratropium for Nebulization 3 milliLiter(s) Nebulizer every 6 hours  artificial  tears Solution 1 Drop(s) Both EYES every 6 hours  ascorbic acid 500 milliGRAM(s) Oral daily  Biotene Dry Mouth Oral Rinse 5 milliLiter(s) Swish and Spit every 6 hours  calcium carbonate 1250 mG  + Vitamin D (OsCal 500 + D) 1 Tablet(s) Oral <User Schedule>  chlorhexidine 0.12% Liquid 15 milliLiter(s) Oral Mucosa every 12 hours  chlorhexidine 4% Liquid 1 Application(s) Topical daily  dextrose 5%. 1000 milliLiter(s) (100 mL/Hr) IV Continuous <Continuous>  dextrose 5%. 1000 milliLiter(s) (50 mL/Hr) IV Continuous <Continuous>  dextrose 5%. 1000 milliLiter(s) (50 mL/Hr) IV Continuous <Continuous>  dextrose 5%. 1000 milliLiter(s) (100 mL/Hr) IV Continuous <Continuous>  dextrose 50% Injectable 25 Gram(s) IV Push once  dextrose 50% Injectable 25 Gram(s) IV Push once  dextrose 50% Injectable 12.5 Gram(s) IV Push once  diclofenac sodium 1% Gel 2 Gram(s) Topical every 6 hours  escitalopram 10 milliGRAM(s) Oral <User Schedule>  fentaNYL   Patch  12 MICROgram(s)/Hr 1 Patch Transdermal every 72 hours  fentaNYL   Patch  25 MICROgram(s)/Hr 1 Patch Transdermal every 72 hours  gabapentin Solution 250 milliGRAM(s) Oral <User Schedule>  glucagon  Injectable 1 milliGRAM(s) IntraMuscular once  glucagon  Injectable 1 milliGRAM(s) IntraMuscular once  hemorrhoidal Ointment 1 Application(s) Rectal at bedtime  influenza  Vaccine (HIGH DOSE) 0.5 milliLiter(s) IntraMuscular once  insulin glargine Injectable (LANTUS) 14 Unit(s) SubCutaneous <User Schedule>  insulin lispro (ADMELOG) corrective regimen sliding scale   SubCutaneous every 6 hours  insulin lispro Injectable (ADMELOG) 18 Unit(s) SubCutaneous <User Schedule>  insulin lispro Injectable (ADMELOG) 30 Unit(s) SubCutaneous <User Schedule>  lactobacillus acidophilus 1 Tablet(s) Oral <User Schedule>  levothyroxine 125 MICROGram(s) Oral <User Schedule>  lidocaine   4% Patch 4 Patch Transdermal every 24 hours  melatonin 12 milliGRAM(s) Oral <User Schedule>  nystatin Powder 1 Application(s) Topical every 8 hours  pantoprazole  Injectable 40 milliGRAM(s) IV Push every 12 hours  predniSONE  Solution 5 milliGRAM(s) Enteral Tube <User Schedule>  QUEtiapine 12.5 milliGRAM(s) Oral <User Schedule>  sodium chloride 1 Gram(s) Oral every 12 hours  sodium chloride 0.65% Nasal 1 Spray(s) Both Nostrils every 6 hours  vancomycin    Solution 125 milliGRAM(s) Oral every 6 hours  witch hazel Pads 1 Application(s) Topical every 12 hours    MEDICATIONS  (PRN):  aluminum hydroxide/magnesium hydroxide/simethicone Suspension 30 milliLiter(s) Enteral Tube every 4 hours PRN Dyspepsia  oxyCODONE    Solution 2.5 milliGRAM(s) Oral every 6 hours PRN Severe Pain (7 - 10)      LABS:                        9.2    14.86 )-----------( 158      ( 2024 06:15 )             30.7     11    133[L]  |  99  |  10  ----------------------------<  118[H]  3.7   |  22  |  <0.30[L]    Ca    7.7[L]      2024 06:15  Phos  3.4       Mg     1.5         TPro  5.9[L]  /  Alb  2.0[L]  /  TBili  0.6  /  DBili  x   /  AST  29  /  ALT  17  /  AlkPhos  109        Urinalysis Basic - ( 2024 13:44 )    Color: Dark Yellow / Appearance: Clear / S.019 / pH: x  Gluc: x / Ketone: Negative mg/dL  / Bili: Small / Urobili: 0.2 mg/dL   Blood: x / Protein: Trace mg/dL / Nitrite: Negative   Leuk Esterase: Negative / RBC: x / WBC x   Sq Epi: x / Non Sq Epi: x / Bacteria: x          CAPILLARY BLOOD GLUCOSE    MICROBIOLOGY:     RADIOLOGY:  [ ] Reviewed and interpreted by me    Mode: AC/ CMV (Assist Control/ Continuous Mandatory Ventilation)  RR (machine): 18  TV (machine): 350  FiO2: 30  PEEP: 5  ITime: 1  MAP: 10  PIP: 29   Patient is a 74y old  Female who presents with a chief complaint of Patient admitted with Hypoxemia and episode of Bright red blood per rectum (2024 18:14)    Interval Events:  No acute events overnight.    REVIEW OF SYSTEMS:  [ ] Positive  [ ] All other systems negative  [X] Unable to assess ROS because ____limited capacity to communicate____    Vital Signs Last 24 Hrs  T(C): 36.2 (24 @ 05:16), Max: 37.8 (24 @ 07:53)  T(F): 97.2 (24 @ 05:16), Max: 100 (24 @ 07:53)  HR: 82 (24 @ 06:11) (74 - 98)  BP: 114/78 (24 @ 05:16) (95/41 - 151/60)  RR: 18 (24 @ 05:16) (18 - 18)  SpO2: 100% (24 @ 06:11) (99% - 100%)    PHYSICAL EXAM:  HEENT:   [X]Tracheostomy: #8 XLT cuffed shiley   [X]Pupils equal  [ ]No oral lesions  [ ]Abnormal    SKIN  [ ]No Rash  [ ] Abnormal  [X] pressure    CARDIAC  [ ]Regular  [ ]Abnormal    PULMONARY  [ ]Bilateral Clear Breath Sounds  [ ]Normal Excursion  [ ]Abnormal    GI  [ ]PEG      [ ] +BS		              [ ]Soft, nondistended, nontender	  [ ]Abnormal    MUSCULOSKELETAL                                   [ ]Bedbound                 [ ]Abnormal    [ ]Ambulatory/OOB to chair                           EXTREMITIES                                         [ ]Normal  [ ]Edema                           NEUROLOGIC  [ ] Normal, non focal  [ ] Focal findings:    PSYCHIATRIC  [ ]Alert and appropriate  [ ] Sedated	 [ ]Agitated    :  Mcbride: [ ] Yes, if yes: Date of Placement:                   [  ] No    LINES: Central Lines [ ] Yes, if yes: Date of Placement                                     [  ] No    HOSPITAL MEDICATIONS:  MEDICATIONS  (STANDING):  albuterol/ipratropium for Nebulization 3 milliLiter(s) Nebulizer every 6 hours  artificial  tears Solution 1 Drop(s) Both EYES every 6 hours  ascorbic acid 500 milliGRAM(s) Oral daily  Biotene Dry Mouth Oral Rinse 5 milliLiter(s) Swish and Spit every 6 hours  calcium carbonate 1250 mG  + Vitamin D (OsCal 500 + D) 1 Tablet(s) Oral <User Schedule>  chlorhexidine 0.12% Liquid 15 milliLiter(s) Oral Mucosa every 12 hours  chlorhexidine 4% Liquid 1 Application(s) Topical daily  dextrose 5%. 1000 milliLiter(s) (100 mL/Hr) IV Continuous <Continuous>  dextrose 5%. 1000 milliLiter(s) (50 mL/Hr) IV Continuous <Continuous>  dextrose 5%. 1000 milliLiter(s) (50 mL/Hr) IV Continuous <Continuous>  dextrose 5%. 1000 milliLiter(s) (100 mL/Hr) IV Continuous <Continuous>  dextrose 50% Injectable 25 Gram(s) IV Push once  dextrose 50% Injectable 25 Gram(s) IV Push once  dextrose 50% Injectable 12.5 Gram(s) IV Push once  diclofenac sodium 1% Gel 2 Gram(s) Topical every 6 hours  escitalopram 10 milliGRAM(s) Oral <User Schedule>  fentaNYL   Patch  12 MICROgram(s)/Hr 1 Patch Transdermal every 72 hours  fentaNYL   Patch  25 MICROgram(s)/Hr 1 Patch Transdermal every 72 hours  gabapentin Solution 250 milliGRAM(s) Oral <User Schedule>  glucagon  Injectable 1 milliGRAM(s) IntraMuscular once  glucagon  Injectable 1 milliGRAM(s) IntraMuscular once  hemorrhoidal Ointment 1 Application(s) Rectal at bedtime  influenza  Vaccine (HIGH DOSE) 0.5 milliLiter(s) IntraMuscular once  insulin glargine Injectable (LANTUS) 14 Unit(s) SubCutaneous <User Schedule>  insulin lispro (ADMELOG) corrective regimen sliding scale   SubCutaneous every 6 hours  insulin lispro Injectable (ADMELOG) 18 Unit(s) SubCutaneous <User Schedule>  insulin lispro Injectable (ADMELOG) 30 Unit(s) SubCutaneous <User Schedule>  lactobacillus acidophilus 1 Tablet(s) Oral <User Schedule>  levothyroxine 125 MICROGram(s) Oral <User Schedule>  lidocaine   4% Patch 4 Patch Transdermal every 24 hours  melatonin 12 milliGRAM(s) Oral <User Schedule>  nystatin Powder 1 Application(s) Topical every 8 hours  pantoprazole  Injectable 40 milliGRAM(s) IV Push every 12 hours  predniSONE  Solution 5 milliGRAM(s) Enteral Tube <User Schedule>  QUEtiapine 12.5 milliGRAM(s) Oral <User Schedule>  sodium chloride 1 Gram(s) Oral every 12 hours  sodium chloride 0.65% Nasal 1 Spray(s) Both Nostrils every 6 hours  vancomycin    Solution 125 milliGRAM(s) Oral every 6 hours  witch hazel Pads 1 Application(s) Topical every 12 hours    MEDICATIONS  (PRN):  aluminum hydroxide/magnesium hydroxide/simethicone Suspension 30 milliLiter(s) Enteral Tube every 4 hours PRN Dyspepsia  oxyCODONE    Solution 2.5 milliGRAM(s) Oral every 6 hours PRN Severe Pain (7 - 10)      LABS:                        9.2    14.86 )-----------( 158      ( 2024 06:15 )             30.7     11-    133[L]  |  99  |  10  ----------------------------<  118[H]  3.7   |  22  |  <0.30[L]    Ca    7.7[L]      2024 06:15  Phos  3.4       Mg     1.5         TPro  5.9[L]  /  Alb  2.0[L]  /  TBili  0.6  /  DBili  x   /  AST  29  /  ALT  17  /  AlkPhos  109  11-      Urinalysis Basic - ( 2024 13:44 )    Color: Dark Yellow / Appearance: Clear / S.019 / pH: x  Gluc: x / Ketone: Negative mg/dL  / Bili: Small / Urobili: 0.2 mg/dL   Blood: x / Protein: Trace mg/dL / Nitrite: Negative   Leuk Esterase: Negative / RBC: x / WBC x   Sq Epi: x / Non Sq Epi: x / Bacteria: x          CAPILLARY BLOOD GLUCOSE    MICROBIOLOGY:     RADIOLOGY:  [ ] Reviewed and interpreted by me    Mode: AC/ CMV (Assist Control/ Continuous Mandatory Ventilation)  RR (machine): 18  TV (machine): 350  FiO2: 30  PEEP: 5  ITime: 1  MAP: 10  PIP: 29   Patient is a 74y old  Female who presents with a chief complaint of Patient admitted with Hypoxemia and episode of Bright red blood per rectum (2024 18:14)    Interval Events:  No acute events overnight.    REVIEW OF SYSTEMS:  [ ] Positive  [ ] All other systems negative  [X] Unable to assess ROS because ____limited capacity to communicate____    Vital Signs Last 24 Hrs  T(C): 36.2 (24 @ 05:16), Max: 37.8 (24 @ 07:53)  T(F): 97.2 (24 @ 05:16), Max: 100 (24 @ 07:53)  HR: 82 (24 @ 06:11) (74 - 98)  BP: 114/78 (24 @ 05:16) (95/41 - 151/60)  RR: 18 (24 @ 05:16) (18 - 18)  SpO2: 100% (24 @ 06:11) (99% - 100%)    PHYSICAL EXAM:  HEENT:   [X]Tracheostomy: #8 XLT cuffed shiley   [X]Pupils equal  [ ]No oral lesions  [ ]Abnormal    SKIN  [ ]No Rash  [ ] Abnormal  [X] pressure - sacral unstageable    CARDIAC  [X]Regular  [ ]Abnormal    PULMONARY  [ ]Bilateral Clear Breath Sounds  [ ]Normal Excursion  [X]Abnormal - mild coarse BS     GI  [X]PEG      [X] +BS		              [X]Soft, nondistended, nontender	  [X]Abnormal - old PEG site c/d/i     MUSCULOSKELETAL                                   [X]Bedbound                 [ ]Abnormal    [ ]Ambulatory/OOB to chair                           EXTREMITIES                                         [ ]Normal  [X]Edema - mild generalized pitting edema                          NEUROLOGIC  [ ] Normal, non focal  [X] Focal findings:  lethargic but following commands    PSYCHIATRIC  [ ] Easily arousable, lethargic  [ ] Sedated	 [ ]Agitated    :  Mcbride: [ ] Yes, if yes: Date of Placement:                   [X] No    LINES: Central Lines [ ] Yes, if yes: Date of Placement                                     [X] No    HOSPITAL MEDICATIONS:  MEDICATIONS  (STANDING):  albuterol/ipratropium for Nebulization 3 milliLiter(s) Nebulizer every 6 hours  artificial  tears Solution 1 Drop(s) Both EYES every 6 hours  ascorbic acid 500 milliGRAM(s) Oral daily  Biotene Dry Mouth Oral Rinse 5 milliLiter(s) Swish and Spit every 6 hours  calcium carbonate 1250 mG  + Vitamin D (OsCal 500 + D) 1 Tablet(s) Oral <User Schedule>  chlorhexidine 0.12% Liquid 15 milliLiter(s) Oral Mucosa every 12 hours  chlorhexidine 4% Liquid 1 Application(s) Topical daily  dextrose 5%. 1000 milliLiter(s) (100 mL/Hr) IV Continuous <Continuous>  dextrose 5%. 1000 milliLiter(s) (50 mL/Hr) IV Continuous <Continuous>  dextrose 5%. 1000 milliLiter(s) (50 mL/Hr) IV Continuous <Continuous>  dextrose 5%. 1000 milliLiter(s) (100 mL/Hr) IV Continuous <Continuous>  dextrose 50% Injectable 25 Gram(s) IV Push once  dextrose 50% Injectable 25 Gram(s) IV Push once  dextrose 50% Injectable 12.5 Gram(s) IV Push once  diclofenac sodium 1% Gel 2 Gram(s) Topical every 6 hours  escitalopram 10 milliGRAM(s) Oral <User Schedule>  fentaNYL   Patch  12 MICROgram(s)/Hr 1 Patch Transdermal every 72 hours  fentaNYL   Patch  25 MICROgram(s)/Hr 1 Patch Transdermal every 72 hours  gabapentin Solution 250 milliGRAM(s) Oral <User Schedule>  glucagon  Injectable 1 milliGRAM(s) IntraMuscular once  glucagon  Injectable 1 milliGRAM(s) IntraMuscular once  hemorrhoidal Ointment 1 Application(s) Rectal at bedtime  influenza  Vaccine (HIGH DOSE) 0.5 milliLiter(s) IntraMuscular once  insulin glargine Injectable (LANTUS) 14 Unit(s) SubCutaneous <User Schedule>  insulin lispro (ADMELOG) corrective regimen sliding scale   SubCutaneous every 6 hours  insulin lispro Injectable (ADMELOG) 18 Unit(s) SubCutaneous <User Schedule>  insulin lispro Injectable (ADMELOG) 30 Unit(s) SubCutaneous <User Schedule>  lactobacillus acidophilus 1 Tablet(s) Oral <User Schedule>  levothyroxine 125 MICROGram(s) Oral <User Schedule>  lidocaine   4% Patch 4 Patch Transdermal every 24 hours  melatonin 12 milliGRAM(s) Oral <User Schedule>  nystatin Powder 1 Application(s) Topical every 8 hours  pantoprazole  Injectable 40 milliGRAM(s) IV Push every 12 hours  predniSONE  Solution 5 milliGRAM(s) Enteral Tube <User Schedule>  QUEtiapine 12.5 milliGRAM(s) Oral <User Schedule>  sodium chloride 1 Gram(s) Oral every 12 hours  sodium chloride 0.65% Nasal 1 Spray(s) Both Nostrils every 6 hours  vancomycin    Solution 125 milliGRAM(s) Oral every 6 hours  witch hazel Pads 1 Application(s) Topical every 12 hours    MEDICATIONS  (PRN):  aluminum hydroxide/magnesium hydroxide/simethicone Suspension 30 milliLiter(s) Enteral Tube every 4 hours PRN Dyspepsia  oxyCODONE    Solution 2.5 milliGRAM(s) Oral every 6 hours PRN Severe Pain (7 - 10)    LABS:                        9.2    14.86 )-----------( 158      ( 2024 06:15 )             30.7         133[L]  |  99  |  10  ----------------------------<  118[H]  3.7   |  22  |  <0.30[L]    Ca    7.7[L]      2024 06:15  Phos  3.4       Mg     1.5         TPro  5.9[L]  /  Alb  2.0[L]  /  TBili  0.6  /  DBili  x   /  AST  29  /  ALT  17  /  AlkPhos  109      Urinalysis Basic - ( 2024 13:44 )    Color: Dark Yellow / Appearance: Clear / S.019 / pH: x  Gluc: x / Ketone: Negative mg/dL  / Bili: Small / Urobili: 0.2 mg/dL   Blood: x / Protein: Trace mg/dL / Nitrite: Negative   Leuk Esterase: Negative / RBC: x / WBC x   Sq Epi: x / Non Sq Epi: x / Bacteria: x    CAPILLARY BLOOD GLUCOSE    MICROBIOLOGY:     RADIOLOGY:  [ ] Reviewed and interpreted by me    Mode: AC/ CMV (Assist Control/ Continuous Mandatory Ventilation)  RR (machine): 18  TV (machine): 350  FiO2: 30  PEEP: 5  ITime: 1  MAP: 10  PIP: 29

## 2024-11-02 NOTE — PROGRESS NOTE ADULT - SUBJECTIVE AND OBJECTIVE BOX
Patient seen in coverage    Patient is a 74y old  Female who presents with a chief complaint of Patient admitted with Hypoxemia and episode of Bright red blood per rectum (02 Nov 2024 15:01)      Medication:   albuterol/ipratropium for Nebulization 3 milliLiter(s) Nebulizer every 6 hours  aluminum hydroxide/magnesium hydroxide/simethicone Suspension 30 milliLiter(s) Enteral Tube every 4 hours PRN  artificial  tears Solution 1 Drop(s) Both EYES every 6 hours  ascorbic acid 500 milliGRAM(s) Oral daily  Biotene Dry Mouth Oral Rinse 5 milliLiter(s) Swish and Spit every 6 hours  calcium carbonate 1250 mG  + Vitamin D (OsCal 500 + D) 1 Tablet(s) Oral <User Schedule>  chlorhexidine 0.12% Liquid 15 milliLiter(s) Oral Mucosa every 12 hours  chlorhexidine 4% Liquid 1 Application(s) Topical daily  dextrose 5%. 1000 milliLiter(s) IV Continuous <Continuous>  dextrose 5%. 1000 milliLiter(s) IV Continuous <Continuous>  dextrose 5%. 1000 milliLiter(s) IV Continuous <Continuous>  dextrose 5%. 1000 milliLiter(s) IV Continuous <Continuous>  dextrose 50% Injectable 25 Gram(s) IV Push once  dextrose 50% Injectable 25 Gram(s) IV Push once  dextrose 50% Injectable 12.5 Gram(s) IV Push once  diclofenac sodium 1% Gel 2 Gram(s) Topical every 6 hours  escitalopram 10 milliGRAM(s) Oral <User Schedule>  fentaNYL   Patch  12 MICROgram(s)/Hr 1 Patch Transdermal every 72 hours  fentaNYL   Patch  25 MICROgram(s)/Hr 1 Patch Transdermal every 72 hours  gabapentin Solution 250 milliGRAM(s) Oral <User Schedule>  glucagon  Injectable 1 milliGRAM(s) IntraMuscular once  glucagon  Injectable 1 milliGRAM(s) IntraMuscular once  hemorrhoidal Ointment 1 Application(s) Rectal at bedtime  influenza  Vaccine (HIGH DOSE) 0.5 milliLiter(s) IntraMuscular once  insulin glargine Injectable (LANTUS) 14 Unit(s) SubCutaneous <User Schedule>  insulin lispro (ADMELOG) corrective regimen sliding scale   SubCutaneous every 6 hours  insulin lispro Injectable (ADMELOG) 30 Unit(s) SubCutaneous <User Schedule>  insulin lispro Injectable (ADMELOG) 18 Unit(s) SubCutaneous <User Schedule>  lactobacillus acidophilus 1 Tablet(s) Oral <User Schedule>  levothyroxine 125 MICROGram(s) Oral <User Schedule>  lidocaine   4% Patch 4 Patch Transdermal every 24 hours  melatonin 12 milliGRAM(s) Oral <User Schedule>  nystatin Powder 1 Application(s) Topical every 8 hours  oxyCODONE    Solution 2.5 milliGRAM(s) Oral every 6 hours PRN  oxyCODONE    Solution 5 milliGRAM(s) Oral every 6 hours PRN  oxyCODONE    Solution 10 milliGRAM(s) Oral every 6 hours PRN  pantoprazole  Injectable 40 milliGRAM(s) IV Push every 12 hours  predniSONE  Solution 5 milliGRAM(s) Enteral Tube <User Schedule>  QUEtiapine 12.5 milliGRAM(s) Oral <User Schedule>  sodium chloride 1 Gram(s) Oral every 12 hours  sodium chloride 0.65% Nasal 1 Spray(s) Both Nostrils every 6 hours  vancomycin    Solution 125 milliGRAM(s) Oral every 6 hours  witch hazel Pads 1 Application(s) Topical every 12 hours      Physical exam    T(C): 36.7 (11-02-24 @ 17:25), Max: 38.6 (11-02-24 @ 12:31)  HR: 82 (11-02-24 @ 21:33) (78 - 108)  BP: 124/54 (11-02-24 @ 17:25) (104/58 - 151/60)  RR: 20 (11-02-24 @ 17:25) (18 - 20)  SpO2: 100% (11-02-24 @ 21:33) (98% - 100%)  Wt(kg): --    alert   Trach  Cv s1 S2 RRR  Lungs clear anteriorly  abd soft NT ND +BS      Labs                        9.4    15.69 )-----------( 139      ( 02 Nov 2024 10:09 )             32.6       11-02    131[L]  |  100  |  17  ----------------------------<  114[H]  4.4   |  18[L]  |  <0.30[L]    Ca    8.0[L]      02 Nov 2024 06:56  Phos  3.2     11-02  Mg     1.7     11-02    TPro  6.2  /  Alb  2.5[L]  /  TBili  0.6  /  DBili  x   /  AST  19  /  ALT  14  /  AlkPhos  98  11-02      LIVER FUNCTIONS - ( 02 Nov 2024 06:56 )  Alb: 2.5 g/dL / Pro: 6.2 g/dL / ALK PHOS: 98 U/L / ALT: 14 U/L / AST: 19 U/L / GGT: x             [id number]

## 2024-11-02 NOTE — PROGRESS NOTE ADULT - ASSESSMENT
73 yo F PMH T2DM on insulin, HTN, HLD, hypothyroidism, RA on Prednisone, Fibromyalgia, cerebral aneurysm s/p repair, chronic hypercapnic respiratory failure s/p trach (vent dependent) and chronic PEG 2022, recurrent C. diff infection (follows with Dr. 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  Pt also noted to have rectal bleeding this past week requiring transfusion 5 days ago in ED as per daughter. Daughter also reports brownish discharge from G-J tube. In ED, pt afebrile, fiO2 96-98% on 40% on ventilator.  Chest X-ray w/ opacification of right lung concerning for possible PNA.  Admitted to [facility]CU for further management. Sputum cxs grew PSA; treated with course of Cefepime. Patient with decreased H+H received 1 unit of PRBCS on 10/10.  10/14 EGD w/ Dr. Rosales for PEGJ exchange and clippings placed for closure of fistula site.  Persistent fevers treated with meropenem and vanc (d/c'd 10/20).  CT A/P without infectious source.  Continue airway management with duonebs, chest PT and suction PRN.        10/30: Patient given 1 unit of PRBCs today hgb 6.8. Dr. Myah Coleman called for consult. Patient seen by Optho recommended discontinuation of prednisolone gtts and initiating artificial tears. RT Dislodged yesterday remains with frequent stooling but not diarrhea. Will resume Imodium 2mg q 12hrs. Continue to monitor Gastric output improved today.    10/31: Hgb over responded to 1 unit.  No signs of bleeding observed, stool remains brown.  Hematology consult noted.  J-port again obstructed distally.  Attempted gingerale and sodium bicarb flush but unable to unclog.  GI, Dr. Rosales, notified.  Tube feeding study for tube position performed.  NPO for now, D5NS until glucose stabilizes given insulin still in system.  11/1:  J-tube working this morning.  Likely distal clog dissolved over time given attempts made yesterday.  Feeds and medications resumed via J-port without issues during the day.  Fever of 102F over night.   Pan-cultured, Cdiff positive, started on Vancomycin PO.  As discussed with daughter, it is not advisable nor should be practiced to give medications via gravity via J-tube as it would in fact cause slow sedimentation to build on the slits of the opening eventually accumulating to the point of obstruction.  Adequate gentle flushing  of medication followed by gentle water flush should be sufficient.  Thickening agents like banatrol should  be re-evaluated.  75 yo F PMH T2DM on insulin, HTN, HLD, hypothyroidism, RA on Prednisone, Fibromyalgia, cerebral aneurysm s/p repair, chronic hypercapnic respiratory failure s/p trach (vent dependent) and chronic PEG 2022, recurrent C. diff infection (follows with Dr. 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  Pt also noted to have rectal bleeding this past week requiring transfusion 5 days ago in ED as per daughter. Daughter also reports brownish discharge from G-J tube. In ED, pt afebrile, fiO2 96-98% on 40% on ventilator.  Chest X-ray w/ opacification of right lung concerning for possible PNA.  Admitted to RCU for further management. Sputum cxs grew PSA; treated with course of Cefepime. Patient with decreased H+H received 1 unit of PRBCS on 10/10.  10/14 EGD w/ Dr. Rosales for PEGJ exchange and clippings placed for closure of fistula site.  Persistent fevers treated with meropenem and vanc (d/c'd 10/20).  CT A/P without infectious source.  Continued fevers and persistent leukocytosis 11/1 CDiff positive - po vanco started.  Continue airway management with duonebs, chest PT and suction PRN.        11/2:  Febrile again today, IV tylenol given.  Continuing PO Vanco for now.  Discussed with Dr. Newman this am, will not escalate or add antibiotics for now.  Was going to place rectal tube but as per bedside RN - pt does not have frequent uncontrollable diarrhea so will hold off on rectal tube for now.  Pain regiment adjusted as per daughters request.

## 2024-11-03 NOTE — PROGRESS NOTE ADULT - SUBJECTIVE AND OBJECTIVE BOX
Patient is a 74y old  Female who presents with a chief complaint of Patient admitted with Hypoxemia and episode of Bright red blood per rectum (2024 22:46)      Interval Events:    REVIEW OF SYSTEMS:  [ ] Positive  [ ] All other systems negative  [ ] Unable to assess ROS because ________    Vital Signs Last 24 Hrs  T(C): 37.2 (24 @ 06:40), Max: 39.3 (24 @ 04:29)  T(F): 98.9 (24 @ 06:40), Max: 102.7 (24 @ 04:29)  HR: 112 (24 @ 06:40) (78 - 114)  BP: 114/47 (24 @ 04:29) (104/58 - 161/74)  RR: 19 (24 @ 06:40) (18 - 20)  SpO2: 100% (24 @ 06:40) (98% - 100%)    PHYSICAL EXAM:  HEENT:   [ ]Tracheostomy:  [ ]Pupils equal  [ ]No oral lesions  [ ]Abnormal    SKIN  [ ]No Rash  [ ] Abnormal  [ ] pressure    CARDIAC  [ ]Regular  [ ]Abnormal    PULMONARY  [ ]Bilateral Clear Breath Sounds  [ ]Normal Excursion  [ ]Abnormal    GI  [ ]PEG      [ ] +BS		              [ ]Soft, nondistended, nontender	  [ ]Abnormal    MUSCULOSKELETAL                                   [ ]Bedbound                 [ ]Abnormal    [ ]Ambulatory/OOB to chair                           EXTREMITIES                                         [ ]Normal  [ ]Edema                           NEUROLOGIC  [ ] Normal, non focal  [ ] Focal findings:    PSYCHIATRIC  [ ]Alert and appropriate  [ ] Sedated	 [ ]Agitated    :  Mcbride: [ ] Yes, if yes: Date of Placement:                   [  ] No    LINES: Central Lines [ ] Yes, if yes: Date of Placement                                     [  ] No    HOSPITAL MEDICATIONS:  MEDICATIONS  (STANDING):  albuterol/ipratropium for Nebulization 3 milliLiter(s) Nebulizer every 6 hours  artificial  tears Solution 1 Drop(s) Both EYES every 6 hours  ascorbic acid 500 milliGRAM(s) Oral daily  Biotene Dry Mouth Oral Rinse 5 milliLiter(s) Swish and Spit every 6 hours  calcium carbonate 1250 mG  + Vitamin D (OsCal 500 + D) 1 Tablet(s) Oral <User Schedule>  chlorhexidine 0.12% Liquid 15 milliLiter(s) Oral Mucosa every 12 hours  chlorhexidine 4% Liquid 1 Application(s) Topical daily  dextrose 5%. 1000 milliLiter(s) (100 mL/Hr) IV Continuous <Continuous>  dextrose 5%. 1000 milliLiter(s) (50 mL/Hr) IV Continuous <Continuous>  dextrose 5%. 1000 milliLiter(s) (100 mL/Hr) IV Continuous <Continuous>  dextrose 5%. 1000 milliLiter(s) (50 mL/Hr) IV Continuous <Continuous>  dextrose 50% Injectable 25 Gram(s) IV Push once  dextrose 50% Injectable 25 Gram(s) IV Push once  dextrose 50% Injectable 12.5 Gram(s) IV Push once  diclofenac sodium 1% Gel 2 Gram(s) Topical every 6 hours  escitalopram 10 milliGRAM(s) Oral <User Schedule>  fentaNYL   Patch  12 MICROgram(s)/Hr 1 Patch Transdermal every 72 hours  fentaNYL   Patch  25 MICROgram(s)/Hr 1 Patch Transdermal every 72 hours  gabapentin Solution 250 milliGRAM(s) Oral <User Schedule>  glucagon  Injectable 1 milliGRAM(s) IntraMuscular once  glucagon  Injectable 1 milliGRAM(s) IntraMuscular once  hemorrhoidal Ointment 1 Application(s) Rectal at bedtime  influenza  Vaccine (HIGH DOSE) 0.5 milliLiter(s) IntraMuscular once  insulin glargine Injectable (LANTUS) 14 Unit(s) SubCutaneous <User Schedule>  insulin lispro (ADMELOG) corrective regimen sliding scale   SubCutaneous every 6 hours  insulin lispro Injectable (ADMELOG) 18 Unit(s) SubCutaneous <User Schedule>  insulin lispro Injectable (ADMELOG) 30 Unit(s) SubCutaneous <User Schedule>  lactobacillus acidophilus 1 Tablet(s) Oral <User Schedule>  levothyroxine 125 MICROGram(s) Oral <User Schedule>  lidocaine   4% Patch 4 Patch Transdermal every 24 hours  melatonin 12 milliGRAM(s) Oral <User Schedule>  nystatin Powder 1 Application(s) Topical every 8 hours  pantoprazole  Injectable 40 milliGRAM(s) IV Push every 12 hours  predniSONE  Solution 5 milliGRAM(s) Enteral Tube <User Schedule>  QUEtiapine 12.5 milliGRAM(s) Oral <User Schedule>  sodium chloride 1 Gram(s) Oral every 12 hours  sodium chloride 0.65% Nasal 1 Spray(s) Both Nostrils every 6 hours  vancomycin    Solution 125 milliGRAM(s) Oral every 6 hours  witch hazel Pads 1 Application(s) Topical every 12 hours    MEDICATIONS  (PRN):  aluminum hydroxide/magnesium hydroxide/simethicone Suspension 30 milliLiter(s) Enteral Tube every 4 hours PRN Dyspepsia  oxyCODONE    Solution 2.5 milliGRAM(s) Oral every 6 hours PRN Mild Pain (1 - 3)  oxyCODONE    Solution 5 milliGRAM(s) Oral every 6 hours PRN Moderate Pain (4 - 6)  oxyCODONE    Solution 10 milliGRAM(s) Oral every 6 hours PRN Severe Pain (7 - 10)      LABS:                        9.4    15.69 )-----------( 139      ( 2024 10:09 )             32.6     11-    131[L]  |  100  |  17  ----------------------------<  114[H]  4.4   |  18[L]  |  <0.30[L]    Ca    8.0[L]      2024 06:56  Phos  3.2       Mg     1.7         TPro  6.2  /  Alb  2.5[L]  /  TBili  0.6  /  DBili  x   /  AST  19  /  ALT  14  /  AlkPhos  98  11-      Urinalysis Basic - ( 2024 07:10 )    Color: Yellow / Appearance: Clear / S.011 / pH: x  Gluc: x / Ketone: Trace mg/dL  / Bili: Negative / Urobili: 0.2 mg/dL   Blood: x / Protein: Trace mg/dL / Nitrite: Negative   Leuk Esterase: Negative / RBC: x / WBC x   Sq Epi: x / Non Sq Epi: x / Bacteria: x          CAPILLARY BLOOD GLUCOSE    MICROBIOLOGY:     RADIOLOGY:  [ ] Reviewed and interpreted by me    Mode: AC/ CMV (Assist Control/ Continuous Mandatory Ventilation)  RR (machine): 18  TV (machine): 350  FiO2: 30  PEEP: 5  ITime: 1  MAP: 11  PIP: 31   Patient is a 74y old  Female who presents with a chief complaint of Patient admitted with Hypoxemia and episode of Bright red blood per rectum (2024 22:46)    Interval Events:  Febrile overnight.                            Infectious w/u sent.     REVIEW OF SYSTEMS:  [X] Positive - abdominal pain   [ ] All other systems negative  [ ] Unable to assess ROS because ________    Vital Signs Last 24 Hrs  T(C): 37.2 (24 @ 06:40), Max: 39.3 (24 @ 04:29)  T(F): 98.9 (24 @ 06:40), Max: 102.7 (24 @ 04:29)  HR: 112 (24 @ 06:40) (78 - 114)  BP: 114/47 (24 @ 04:29) (104/58 - 161/74)  RR: 19 (24 @ 06:40) (18 - 20)  SpO2: 100% (24 @ 06:40) (98% - 100%)    PHYSICAL EXAM:  HEENT:   [X]Tracheostomy: #8 XLT cuffed shiley   [X]Pupils equal  [ ]No oral lesions  [ ]Abnormal    SKIN  [ ]No Rash  [ ] Abnormal  [X] pressure - sacral unstageable    CARDIAC  [X]Regular  [ ]Abnormal    PULMONARY  [ ]Bilateral Clear Breath Sounds  [ ]Normal Excursion  [X]Abnormal - mild coarse BS     GI  [X]PEG      [X] +BS		              [X]Soft, nondistended, nontender	  [X]Abnormal - old PEG site c/d/i     MUSCULOSKELETAL                                   [X]Bedbound                 [ ]Abnormal    [ ]Ambulatory/OOB to chair                           EXTREMITIES                                         [ ]Normal  [X]Edema - mild generalized pitting edema                          NEUROLOGIC  [ ] Normal, non focal  [X] Focal findings:  lethargic but following commands    PSYCHIATRIC  [ ] Easily arousable, lethargic  [ ] Sedated	 [ ]Agitated    :  Mcbride: [ ] Yes, if yes: Date of Placement:                   [X] No    LINES: Central Lines [ ] Yes, if yes: Date of Placement                                     [X] No    HOSPITAL MEDICATIONS:  MEDICATIONS  (STANDING):  albuterol/ipratropium for Nebulization 3 milliLiter(s) Nebulizer every 6 hours  artificial  tears Solution 1 Drop(s) Both EYES every 6 hours  ascorbic acid 500 milliGRAM(s) Oral daily  Biotene Dry Mouth Oral Rinse 5 milliLiter(s) Swish and Spit every 6 hours  calcium carbonate 1250 mG  + Vitamin D (OsCal 500 + D) 1 Tablet(s) Oral <User Schedule>  chlorhexidine 0.12% Liquid 15 milliLiter(s) Oral Mucosa every 12 hours  chlorhexidine 4% Liquid 1 Application(s) Topical daily  dextrose 5%. 1000 milliLiter(s) (100 mL/Hr) IV Continuous <Continuous>  dextrose 5%. 1000 milliLiter(s) (50 mL/Hr) IV Continuous <Continuous>  dextrose 5%. 1000 milliLiter(s) (100 mL/Hr) IV Continuous <Continuous>  dextrose 5%. 1000 milliLiter(s) (50 mL/Hr) IV Continuous <Continuous>  dextrose 50% Injectable 25 Gram(s) IV Push once  dextrose 50% Injectable 25 Gram(s) IV Push once  dextrose 50% Injectable 12.5 Gram(s) IV Push once  diclofenac sodium 1% Gel 2 Gram(s) Topical every 6 hours  escitalopram 10 milliGRAM(s) Oral <User Schedule>  fentaNYL   Patch  12 MICROgram(s)/Hr 1 Patch Transdermal every 72 hours  fentaNYL   Patch  25 MICROgram(s)/Hr 1 Patch Transdermal every 72 hours  gabapentin Solution 250 milliGRAM(s) Oral <User Schedule>  glucagon  Injectable 1 milliGRAM(s) IntraMuscular once  glucagon  Injectable 1 milliGRAM(s) IntraMuscular once  hemorrhoidal Ointment 1 Application(s) Rectal at bedtime  influenza  Vaccine (HIGH DOSE) 0.5 milliLiter(s) IntraMuscular once  insulin glargine Injectable (LANTUS) 14 Unit(s) SubCutaneous <User Schedule>  insulin lispro (ADMELOG) corrective regimen sliding scale   SubCutaneous every 6 hours  insulin lispro Injectable (ADMELOG) 18 Unit(s) SubCutaneous <User Schedule>  insulin lispro Injectable (ADMELOG) 30 Unit(s) SubCutaneous <User Schedule>  lactobacillus acidophilus 1 Tablet(s) Oral <User Schedule>  levothyroxine 125 MICROGram(s) Oral <User Schedule>  lidocaine   4% Patch 4 Patch Transdermal every 24 hours  melatonin 12 milliGRAM(s) Oral <User Schedule>  nystatin Powder 1 Application(s) Topical every 8 hours  pantoprazole  Injectable 40 milliGRAM(s) IV Push every 12 hours  predniSONE  Solution 5 milliGRAM(s) Enteral Tube <User Schedule>  QUEtiapine 12.5 milliGRAM(s) Oral <User Schedule>  sodium chloride 1 Gram(s) Oral every 12 hours  sodium chloride 0.65% Nasal 1 Spray(s) Both Nostrils every 6 hours  vancomycin    Solution 125 milliGRAM(s) Oral every 6 hours  witch hazel Pads 1 Application(s) Topical every 12 hours    MEDICATIONS  (PRN):  aluminum hydroxide/magnesium hydroxide/simethicone Suspension 30 milliLiter(s) Enteral Tube every 4 hours PRN Dyspepsia  oxyCODONE    Solution 2.5 milliGRAM(s) Oral every 6 hours PRN Mild Pain (1 - 3)  oxyCODONE    Solution 5 milliGRAM(s) Oral every 6 hours PRN Moderate Pain (4 - 6)  oxyCODONE    Solution 10 milliGRAM(s) Oral every 6 hours PRN Severe Pain (7 - 10)    LABS:                        9.4    15.69 )-----------( 139      ( 2024 10:09 )             32.6     11    131[L]  |  100  |  17  ----------------------------<  114[H]  4.4   |  18[L]  |  <0.30[L]    Ca    8.0[L]      2024 06:56  Phos  3.2       Mg     1.7         TPro  6.2  /  Alb  2.5[L]  /  TBili  0.6  /  DBili  x   /  AST  19  /  ALT  14  /  AlkPhos  98      Urinalysis Basic - ( 2024 07:10 )    Color: Yellow / Appearance: Clear / S.011 / pH: x  Gluc: x / Ketone: Trace mg/dL  / Bili: Negative / Urobili: 0.2 mg/dL   Blood: x / Protein: Trace mg/dL / Nitrite: Negative   Leuk Esterase: Negative / RBC: x / WBC x   Sq Epi: x / Non Sq Epi: x / Bacteria: x    CAPILLARY BLOOD GLUCOSE    MICROBIOLOGY:     RADIOLOGY:  [ ] Reviewed and interpreted by me    Mode: AC/ CMV (Assist Control/ Continuous Mandatory Ventilation)  RR (machine): 18  TV (machine): 350  FiO2: 30  PEEP: 5  ITime: 1  MAP: 11  PIP: 31

## 2024-11-03 NOTE — PROGRESS NOTE ADULT - SUBJECTIVE AND OBJECTIVE BOX
Patient is a 74y old  Female who presents with a chief complaint of Patient admitted with Hypoxemia and episode of Bright red blood per rectum (03 Nov 2024 12:58)      Medication:   albuterol/ipratropium for Nebulization 3 milliLiter(s) Nebulizer every 6 hours  aluminum hydroxide/magnesium hydroxide/simethicone Suspension 30 milliLiter(s) Enteral Tube every 4 hours PRN  artificial  tears Solution 1 Drop(s) Both EYES every 6 hours  ascorbic acid 500 milliGRAM(s) Oral daily  Biotene Dry Mouth Oral Rinse 5 milliLiter(s) Swish and Spit every 6 hours  calcium carbonate 1250 mG  + Vitamin D (OsCal 500 + D) 1 Tablet(s) Oral <User Schedule>  chlorhexidine 0.12% Liquid 15 milliLiter(s) Oral Mucosa every 12 hours  chlorhexidine 4% Liquid 1 Application(s) Topical daily  dextrose 5%. 1000 milliLiter(s) IV Continuous <Continuous>  dextrose 5%. 1000 milliLiter(s) IV Continuous <Continuous>  dextrose 5%. 1000 milliLiter(s) IV Continuous <Continuous>  dextrose 5%. 1000 milliLiter(s) IV Continuous <Continuous>  dextrose 50% Injectable 25 Gram(s) IV Push once  dextrose 50% Injectable 12.5 Gram(s) IV Push once  dextrose 50% Injectable 25 Gram(s) IV Push once  diclofenac sodium 1% Gel 2 Gram(s) Topical every 6 hours  escitalopram 10 milliGRAM(s) Oral <User Schedule>  fentaNYL   Patch  12 MICROgram(s)/Hr 1 Patch Transdermal every 72 hours  fentaNYL   Patch  25 MICROgram(s)/Hr 1 Patch Transdermal every 72 hours  gabapentin Solution 250 milliGRAM(s) Oral <User Schedule>  glucagon  Injectable 1 milliGRAM(s) IntraMuscular once  glucagon  Injectable 1 milliGRAM(s) IntraMuscular once  hemorrhoidal Ointment 1 Application(s) Rectal at bedtime  influenza  Vaccine (HIGH DOSE) 0.5 milliLiter(s) IntraMuscular once  insulin glargine Injectable (LANTUS) 14 Unit(s) SubCutaneous <User Schedule>  insulin lispro (ADMELOG) corrective regimen sliding scale   SubCutaneous every 6 hours  insulin lispro Injectable (ADMELOG) 30 Unit(s) SubCutaneous <User Schedule>  insulin lispro Injectable (ADMELOG) 18 Unit(s) SubCutaneous <User Schedule>  lactobacillus acidophilus 1 Tablet(s) Oral <User Schedule>  levothyroxine 125 MICROGram(s) Oral <User Schedule>  lidocaine   4% Patch 4 Patch Transdermal every 24 hours  melatonin 12 milliGRAM(s) Oral <User Schedule>  metroNIDAZOLE  IVPB 500 milliGRAM(s) IV Intermittent every 8 hours  nystatin Powder 1 Application(s) Topical every 8 hours  oxyCODONE    Solution 10 milliGRAM(s) Oral every 6 hours PRN  oxyCODONE    Solution 2.5 milliGRAM(s) Oral every 6 hours PRN  oxyCODONE    Solution 5 milliGRAM(s) Oral every 6 hours PRN  pantoprazole  Injectable 40 milliGRAM(s) IV Push every 12 hours  predniSONE  Solution 5 milliGRAM(s) Enteral Tube <User Schedule>  QUEtiapine 12.5 milliGRAM(s) Oral <User Schedule>  sodium chloride 1 Gram(s) Oral every 12 hours  sodium chloride 0.65% Nasal 1 Spray(s) Both Nostrils every 6 hours  vancomycin    Solution 125 milliGRAM(s) Oral every 6 hours  witch hazel Pads 1 Application(s) Topical every 12 hours      Physical exam    T(C): 37.1 (11-03-24 @ 11:28), Max: 39.3 (11-03-24 @ 04:29)  HR: 98 (11-03-24 @ 11:53) (78 - 114)  BP: 119/53 (11-03-24 @ 11:28) (114/47 - 161/74)  RR: 19 (11-03-24 @ 06:40) (18 - 20)  SpO2: 100% (11-03-24 @ 11:53) (98% - 100%)  Wt(kg): --    resting NAD  Trach  Cv s1 S2 RRR  Lungs clear anteriorly  No LE edema or tenderness    Labs                       8.5    15.12 )-----------( 164      ( 03 Nov 2024 07:21 )             28.1       11-03    132[L]  |  101  |  15  ----------------------------<  137[H]  3.9   |  19[L]  |  <0.30[L]    Ca    8.2[L]      03 Nov 2024 07:21  Phos  2.7     11-03  Mg     1.6     11-03    TPro  6.1  /  Alb  2.3[L]  /  TBili  1.0  /  DBili  x   /  AST  24  /  ALT  18  /  AlkPhos  111  11-03      LIVER FUNCTIONS - ( 03 Nov 2024 07:21 )  Alb: 2.3 g/dL / Pro: 6.1 g/dL / ALK PHOS: 111 U/L / ALT: 18 U/L / AST: 24 U/L / GGT: x             [id number]

## 2024-11-03 NOTE — PROGRESS NOTE ADULT - ASSESSMENT
73 yo woman PMH T2DM on insulin, HTN, HLD, hypothyroidism, RA on Prednisone, Fibromyalgia, cerebral aneurysm s/p repair, chronic hypercapnic respiratory failure s/p trach (vent dependent) and chronic PEG 2022, recurrent C. diff infection , persistent GJ tube leaks now s/p GC fistula repair 8/12  admitted 10/7/24 with resp distress and found to have RML opacity     Antibiotics  Ceftriaxone 10/7  Azithro 10/7  Cefepime 10/7--> 10/12  meropenem 10/15 --> 10/23  IV Vanco 10/17 --> 10/21  Vanco via NGT 10/24; 11/1-->  Metronidazole 11/3 -->      10/10 melena, anemia, blood tx  10/14 EGD for GJ exchange and piror PEG site closure  One benign-appearing, intrinsic moderate stenosis was found in the upper third of the        esophagus. The stenosis was traversed.       The cardia and gastric fundus were normal.       A gastric tube with tip in the jejunum was found in the gastric body. The PEG required        removal because it was leaking. The J tube extension (12F) was removed first. An externally        removable 24 Fr EndoVive Safety gastrostomy tube was lubricated. The guide wire was passed        through the existing G-tube port and snared endoscopically. The endoscope and snare were then        removed, pulling the wire out through the mouth. The g-tube was tied to the guidewire, pulled        through the mouth into the stomach and then pulled out from the stomach through the skin. The        bumper was attached to the gastrostomy tube. The feeding tube was then cut to an appropriate        length. The final position of the gastrostomy tube was confirmed by relook endoscopy, and        skin marking noted to be 3 cm at the external bumper. The final tension and compression of        the abdominal wall by the PEG tube and external bumper were checked and revealed that the        bumper was moderately tight and mildly deforming the skin. The J tube extension (12 F        EndoVive Jejunal feeding tube) was advanced into the stomach. The tip of the J tube had a        loop thread that was captured with a Resolution clip. The thread and the J tube extension        were advanced to the proximal jejunum, and the endoclip along with the tip of the J tube was        attached to the wall securely. The J tube extension was capped, and the tube site was cleaned        and dressed.       A small fistula was found on the anterior wall of the gastric body related to prior G tube        site. Coagulation of tissue near the fistula using argon plasma at 1.2 liters/minute and 35        peraza was successful. To closure the gastrocutaneous fistula, four hemostatic clips were        successfully placed (MR conditional, two 16 mm DuraClip and 2 Mantis clips.       The examined duodenum was normal.    10/14, 10/15 Fever, transient hypoxia post procedure  10/15 ProCalcitonin = 8.48 suggestive of bacterial process; BCx x2 NGTD; Trach: Pseudomonas   - though benign mg stain  10;16 Leukocytosis WBC = 14.26  10/17 fever, hypotension, abd distension, no diarrhea noted this am WBC = 15.02; Rising Procalcitonin = 38.49; Obstructed J tube   10/18  lost IV access re-gained  - CT scan late this afternoon  WBC = 8.68; Rising Procalcitonin >100  10/18 imaging suggests multifocal pneumonia  10/21 improved chest exam, Procalcitonin level considerably decreased, decreased FiO2  - afebrile since 10/18  10/21 UGI endoscopy done  Findings:       The esophagus was normal. A fistula was found on the anterior wall of the gastric body.       There was evidence of a gastrostomy present in the gastric body. The patient was placed in        the supine position. The endoscope was advanced to the jejunum and the prior placed J tube        with loop attached to the wall and the loop thread was cut by endoclip. The J tube and the G        tube were removed. The gastrostomy fistula was utilized and an Avanos 22 F        Gastrostomy/Jejunal tube was advanced. The jejunal tip was advanced through the pylorus and        into the duodenum. The endoscope was then advanced to the duodenum and the loop thread at the        tip of the J tube was captured and advanced to the proximal jejunum. The loop thread was        clipped to wall by a [employer] Resolution clip. The J tube flushed easily and the G        tube decompress the abdomen easily. The internal balloon was insufflated with 10 cc of water        to hold the GJ tube in place. The outside marking was at 3.  10/23  no distress, liquid stools noted  - Meropenem discontinued  GI PCR NEG  10/24  persistent diarrhea  Cdiff NEG; enteral vanco stopped and Immodium provided  10/28 low grade temps, mild leukocytosis  10/30 blood transfusion  11/1  fever  +Cdiff  11/3 continued fever  - +BC Stph epi most likely procurement contaminant,  modest diarrhea reported  - daughter described increased diarrhea in evenings - Pseudomonas airway colonization is long term, no suggestion of pneumonia at present, sacral decub remains superifical will granulated without signs of infection      Issues  recurrent Cdiff  cerebral aneurysm s/p repair  chronic hypercapnic respiratory failure s/p trach (vent dependent) and chronic PEG 2022  anemia  - had iron deficiency  Pseudomonas aeruginosa upper airway colonization  (most recent isolate Resist to Cipro/Levo)  T2DM on insulin  HTN  HLD  hypothyroidism  RA on Prednisone  Fibromyalgia  debility  sacral ulcer largely healed  malnourished/chronic catabolic state      Suggest  Continue vanco 125 q 6 hours via J tube 11/1 -->  Add Flagyl for Cdiff    if patient improves and able to be discharged, I will consider Bezlotoxumab (Zinplava) 500 mg over 1 hour IV at  home to prevent Cdiff recurrence      discussed with ACP  discussed with daughter

## 2024-11-03 NOTE — PROGRESS NOTE ADULT - SUBJECTIVE AND OBJECTIVE BOX
Follow Up:  Cdiff    Interval History/ROS:  fatigued, some abd pain,   modest diarrhea, but daughter states more diarrhea late afternoon and evening  (tube feeds held overnight)    Allergies  walnut (Unknown)  metronidazole (Rash)  Lyrica (Unknown)  penicillin (Unknown)  Pineapple (Unknown)  Tagamet (Unknown)  heparin (Unknown)  Pecans (Unknown)  Hazelnut (Unknown)  meropenem (Rash)        ANTIMICROBIALS:  metroNIDAZOLE  IVPB 500 every 8 hours  vancomycin    Solution 125 every 6 hours      OTHER MEDS:  MEDICATIONS  (STANDING):  albuterol/ipratropium for Nebulization 3 every 6 hours  aluminum hydroxide/magnesium hydroxide/simethicone Suspension 30 every 4 hours PRN  dextrose 50% Injectable 25 once  dextrose 50% Injectable 25 once  dextrose 50% Injectable 12.5 once  escitalopram 10 <User Schedule>  fentaNYL   Patch  12 MICROgram(s)/Hr 1 every 72 hours  fentaNYL   Patch  25 MICROgram(s)/Hr 1 every 72 hours  gabapentin Solution 250 <User Schedule>  glucagon  Injectable 1 once  glucagon  Injectable 1 once  influenza  Vaccine (HIGH DOSE) 0.5 once  insulin glargine Injectable (LANTUS) 14 <User Schedule>  insulin lispro (ADMELOG) corrective regimen sliding scale  every 6 hours  insulin lispro Injectable (ADMELOG) 18 <User Schedule>  insulin lispro Injectable (ADMELOG) 30 <User Schedule>  levothyroxine 125 <User Schedule>  melatonin 12 <User Schedule>  oxyCODONE    Solution 10 every 6 hours PRN  oxyCODONE    Solution 2.5 every 6 hours PRN  oxyCODONE    Solution 5 every 6 hours PRN  pantoprazole  Injectable 40 every 12 hours  predniSONE  Solution 5 <User Schedule>  QUEtiapine 12.5 <User Schedule>      Vital Signs Last 24 Hrs  T(C): 37.1 (03 Nov 2024 11:28), Max: 39.3 (03 Nov 2024 04:29)  T(F): 98.7 (03 Nov 2024 11:28), Max: 102.7 (03 Nov 2024 04:29)  HR: 98 (03 Nov 2024 11:53) (78 - 114)  BP: 119/53 (03 Nov 2024 11:28) (114/47 - 161/74)  BP(mean): --  RR: 19 (03 Nov 2024 06:40) (18 - 20)  SpO2: 100% (03 Nov 2024 11:53) (98% - 100%)    Parameters below as of 03 Nov 2024 11:53  Patient On (Oxygen Delivery Method): ventilator        PHYSICAL EXAM:  General: NAD, Non-toxic  Neurology: Alert, responsve, nonfocal  Respiratory: Clear to auscultation bilaterally  CV: RRR, S1S2, no murmurs, rubs or gallops  Abdominal: distended, normal bowel sounds  Extremities: No edema,   Line Sites: Clear  Skin: No rash                          8.5    15.12 )-----------( 164      ( 03 Nov 2024 07:21 )             28.1   WBC Count: 15.12 (11-03 @ 07:21)  WBC Count: 15.69 (11-02 @ 10:09)  WBC Count: 14.86 (11-01 @ 06:15)  WBC Count: 11.31 (10-31 @ 08:21)  WBC Count: 11.06 (10-30 @ 09:42)  WBC Count: 12.47 (10-30 @ 06:36)  WBC Count: 14.19 (10-29 @ 15:02)       11-03    132[L]  |  101  |  15  ----------------------------<  137[H]  3.9   |  19[L]  |  <0.30[L]    Ca    8.2[L]      03 Nov 2024 07:21  Phos  2.7     11-03  Mg     1.6     11-03    TPro  6.1  /  Alb  2.3[L]  /  TBili  1.0  /  DBili  x   /  AST  24  /  ALT  18  /  AlkPhos  111  11-03    Urinalysis (11.03.24 @ 07:10)   pH Urine: 6.0  Glucose Qualitative, Urine: Negative mg/dL  Blood, Urine: Negative  Color: Yellow  Urine Appearance: Clear  Bilirubin: Negative  Ketone - Urine: Trace mg/dL  Specific Gravity: 1.011  Protein, Urine: Trace mg/dL  Urobilinogen: 0.2 mg/dL  Nitrite: Negative  Leukocyte Esterase Concentration: Negative      MICROBIOLOGY:  .Blood BLOOD  11-01-24   Growth in anaerobic bottle: Staphylococcus epidermidis  Isolation of Coagulase negative Staphylococcus from single blood culture  sets may represent  contamination. Contact the Microbiology Department at [phone] if  susceptibility testing is needed.  clinically indicated.  Direct identification is available within approximately 3-5  hours either by Blood Panel Multiplexed PCR or Direct  MALDI-TOF. Details: [email or web address]  --  Blood Culture PCR      Catheterized Catheterized  11-01-24   No growth  --  --      .Blood BLOOD  11-01-24   No growth at 48 Hours  --  --      Trach Asp Tracheal Aspirate  10-27-24   Moderate Pseudomonas aeruginosa  Moderate Pseudomonas aeruginosa #2  Multiple Morphological Strains  Commensal vinay consistent with body site  --  Pseudomonas aeruginosa  Pseudomonas aeruginosa      .Blood BLOOD  10-27-24   No growth at 5 days  --  --      .Blood BLOOD  10-17-24   No growth at 5 days  --  --      .Blood BLOOD  10-17-24   No growth at 5 days  --  --      .Blood BLOOD  10-15-24   No growth at 5 days  --  --      Trach Asp Tracheal Aspirate  10-15-24   Mixed gram negative rods including  Numerous Pseudomonas aeruginosa  Commensal vinay consistent with body site  --  Pseudomonas aeruginosa      .Blood BLOOD  10-15-24   No growth at 5 days  --  --      Trach Asp Tracheal Aspirate  10-08-24   Few Pseudomonas aeruginosa  Commensal vinay consistent with body site  --  Pseudomonas aeruginosa      Clean Catch Clean Catch (Midstream)  10-07-24   10,000 - 49,000 CFU/mL Escherichia coli ESBL  --  Escherichia coli ESBL      .Blood BLOOD  10-07-24   No growth at 5 days  --  --      .Blood BLOOD  10-07-24   No growth at 5 days  --  --    Clostridium difficile GDH Toxins A&amp;B, EIA:   Positive (11-01-24 @ 13:44)  Clostridium difficile GDH Interpretation: Positive for toxigenic C. Difficile.  This specimen is positive for C.  Difficile glutamate dehydrogenase (GDH) antigen and positive for C.  Difficile Toxins A & B, by EIA.  (11-01-24 @ 13:44)      RADIOLOGY:  < from: Xray Abdomen 1 View PORTABLE -Routine (Xray Abdomen 1 View PORTABLE -Routine .) (11.01.24 @ 19:54) >  IMPRESSION:  Gastrojejunostomy tube in place.  Nonobstructive bowel gas pattern.    < end of copied text >  < from: Xray Chest 1 View- PORTABLE-Urgent (Xray Chest 1 View- PORTABLE-Urgent .) (11.01.24 @ 11:28) >  IMPRESSION:  Improved aeration of bilateral lung fields, with interval decrease in   size of bilateral pleural effusions.    < end of copied text >      Zion Newman MD; Division of Infectious Disease; Pager: [phone]; nights and weekends: [phone]

## 2024-11-03 NOTE — PROVIDER CONTACT NOTE (SEPSIS SCREENING) - NOTIFICATION DETAILS (SBAR) SITUATION:
Pt has oral temp of 103, .
pt has 102.7 rectal temp, positive for C-diff, on Vancomycin via peg, /47, last cultures done on the 1 st

## 2024-11-03 NOTE — PROGRESS NOTE ADULT - ASSESSMENT
74-year-old female with anemia, AOCD process. Hgb adequate and can monitor.    Thrombocytopenia on and off. Platelets today higher and normal. Likely a benign process.  74-year-old female with anemia, AOCD process. Hgb adequate and can monitor.    Thrombocytopenia on and off. Platelets today higher and normal. Likely a benign process.     Patient seen in coverage and to be seen tomorrow by primary hematology team for further management.

## 2024-11-03 NOTE — PROGRESS NOTE ADULT - ASSESSMENT
75 yo F PMH T2DM on insulin, HTN, HLD, hypothyroidism, RA on Prednisone, Fibromyalgia, cerebral aneurysm s/p repair, chronic hypercapnic respiratory failure s/p trach (vent dependent) and chronic PEG 2022, recurrent C. diff infection (follows with Dr. 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  Pt also noted to have rectal bleeding this past week requiring transfusion 5 days ago in ED as per daughter. Daughter also reports brownish discharge from G-J tube. In ED, pt afebrile, fiO2 96-98% on 40% on ventilator.  Chest X-ray w/ opacification of right lung concerning for possible PNA.  Admitted to RCU for further management. Sputum cxs grew PSA; treated with course of Cefepime. Patient with decreased H+H received 1 unit of PRBCS on 10/10.  10/14 EGD w/ Dr. Rosales for PEGJ exchange and clippings placed for closure of fistula site.  Persistent fevers treated with meropenem and vanc (d/c'd 10/20).  CT A/P without infectious source.  Continued fevers and persistent leukocytosis 11/1 CDiff positive - po vanco started.  Continue airway management with duonebs, chest PT and suction PRN.        11/2:  Febrile again today, IV tylenol given.  Continuing PO Vanco for now.  Discussed with Dr. Newman this am, will not escalate or add antibiotics for now.  Was going to place rectal tube but as per bedside RN - pt does not have frequent uncontrollable diarrhea so will hold off on rectal tube for now.  Pain regiment adjusted as per daughters request.   75 yo F PMH T2DM on insulin, HTN, HLD, hypothyroidism, RA on Prednisone, Fibromyalgia, cerebral aneurysm s/p repair, chronic hypercapnic respiratory failure s/p trach (vent dependent) and chronic PEG 2022, recurrent C. diff infection (follows with Dr. 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  Pt also noted to have rectal bleeding this past week requiring transfusion 5 days ago in ED as per daughter. Daughter also reports brownish discharge from G-J tube. In ED, pt afebrile, fiO2 96-98% on 40% on ventilator.  Chest X-ray w/ opacification of right lung concerning for possible PNA.  Admitted to RCU for further management. Sputum cxs grew PSA; treated with course of Cefepime. Patient with decreased H+H received 1 unit of PRBCS on 10/10.  10/14 EGD w/ Dr. Rosales for PEGJ exchange and clippings placed for closure of fistula site.  Persistent fevers treated with meropenem and vanc (d/c'd 10/20).  CT A/P without infectious source.  Continued fevers and persistent leukocytosis 11/1 CDiff positive - po vanco started, IV flagyl added 11/3.  Continue airway management with duonebs, chest PT and suction PRN.        11/3:  Febrile again overnight.  Discussed with Dr. Newman - aware of positive blood culture MRSE - continuing with PO vanco, added IV flagyl for severe cdiff treatment.  Monitoring for rash as per Dr. Newman as pt has hx of rash with flagyl, treat with IV benadryl if rash develops.  No need for rectal tube right now as diarrhea is manageable to nursing.  Alfred ordered with 4oz of water to dilute as per daughters request.

## 2024-11-03 NOTE — PROGRESS NOTE ADULT - NS ATTEND AMEND GEN_ALL_CORE FT
agree with above  ID f/u appreciated. now on vanco for cdiff  afebrile overnight and today  vent dependent agree with above  hemodynamically stable  wbc coming down  fever  diarrhea output decreased  ID f/u appreciated  cont with the peg vanco for the cdiff, but hold on other abx

## 2024-11-03 NOTE — PROGRESS NOTE ADULT - PROBLEM SELECTOR PLAN 3
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  No focal intra-abdominal/pelvic or subcutaneous collections.  - Occult 10/7: Positive  - Transfused on 10/10 and 10/30 with appropriate response in H+H  - heme consult appreciated

## 2024-11-03 NOTE — PROVIDER CONTACT NOTE (SEPSIS SCREENING) - SEPSIS CRITERIA TO COINCIDE WITH MEWS
Heart Rate greater than 90/Temperature less than 96.8 or greater than 100.4
WBC less than 4 or greater than 12/Heart Rate greater than 90/Temperature less than 96.8 or greater than 100.4

## 2024-11-03 NOTE — PROGRESS NOTE ADULT - PROBLEM SELECTOR PLAN 7
- Continue Standing/ Breakthrough Tylenol   - Continue Gabapentin / PRN OXY  - Continue Fentanyl patches (Renew 11/4)

## 2024-11-03 NOTE — PROGRESS NOTE ADULT - PROBLEM SELECTOR PLAN 2
[] PNA  - CXR 10/7: Opacification of the right middle lobe may represent pneumonia versus atelectasis  - sputum culture 10/8 PSA  - completed course of cefepime on 10/12  - CT A/P 10/18: ?multifocal pneumonia, presence of gastrocutaneous fistula; otherwise no collections/abscess noted  - Txd with meropenem 10/15-10/23; d/c'd for persistent diarrhea  - Currently remains afebrile off abx    #fevers  - cdiff + 11/1   - po vanco

## 2024-11-04 NOTE — PROGRESS NOTE ADULT - PROBLEM SELECTOR PLAN 7
- Continue Standing/ Breakthrough Tylenol   - Continue Gabapentin / PRN OXY  - Continue Fentanyl patches (Renew 11/9)

## 2024-11-04 NOTE — CHART NOTE - NSCHARTNOTEFT_GEN_A_CORE
NUTRITION FOLLOW UP NOTE    PATIENT SEEN FOR: follow up     SOURCE: [] Patient  [x] Current Medical Record  [x] Nursing  [] Family/support person at bedside  [x] Patient unavailable/inappropriate  [] Other:     CHART REVIEWED/EVENTS NOTED.  [] No changes to nutrition care plan to note  [x] Nutrition Status:  -PMH T2DM  -s/p trach (vent dependent) and chronic PEG 2022  -s/p G-J tube exchange by GI on 10/21. J tube for meds and feeds per team    Diet, NPO with Tube Feed:   Tube Feeding Modality: Jejunostomy  [employer] Peptide 1.5 ([id number])  Total Volume for 24 Hours (mL): 960  Continuous  Until Goal Tube Feed Rate (mL per Hour): 80  Tube Feed Duration (in Hours): 12  Tube Feed Start Time: 06:00  Free Water Flush  Pump   Rate (mL per Hour): 50   Frequency: Every Hour  Free Water Flush Instructions:  50ml free water flushes Q1hr  Alfred(7 Gm Arginine/7 Gm Glut/1.2 Gm HMB     Qty per Day:  2  No Carb Prosource (1pkg = 15gms Protein)     Qty per Day:  1  Banatrol TF     Qty per Day:  1/2 packet per daily (11-01-24 @ 08:28) [Active]    CURRENT DIET ORDER IS:  [] Appropriate:  [] Inadequate:  [x] Other: see below for recommendations     NUTRITION INTAKE/PROVISION:  [] PO:  [x] Enteral Nutrition: [employer] Peptide 1.5 + Alfred BID + No carb Prosource daily + 1/2 packet Banatrol daily providing at 100% provision: 1768kcal (32.5kcal/kg), 92g protein (1.7g/kg) and 672ml free water.   -->10/24/24: Daughter continuing to request to continue 80ml/hr of tube feeding be provided x 12 hours.  --> Provider to RN order placed to ensure full tube feeding order of 960ml is provided to the patient.   [] Parenteral Nutrition:    ANTHROPOMETRICS:  Drug Dosing Weight  Height (cm): 149.9 (21 Oct 2024 18:49)  Weight (kg): 54.4 (21 Oct 2024 18:49)  BMI (kg/m2): 24.2 (21 Oct 2024 18:49)  BSA (m2): 1.48 (21 Oct 2024 18:49)  10/16 62.5kg - edema noted  RD will continue to monitor trends.     MEDICATIONS  (STANDING):  ascorbic acid  calcium carbonate 1250 mG  + Vitamin D (OsCal 500 + D)  dextrose 5%.  dextrose 5%.  dextrose 5%.  dextrose 5%.  dextrose 50% Injectable  dextrose 50% Injectable  dextrose 50% Injectable  glucagon  Injectable  glucagon  Injectable  insulin glargine Injectable (LANTUS)  insulin lispro (ADMELOG) corrective regimen sliding scale  insulin lispro Injectable (ADMELOG)  insulin lispro Injectable (ADMELOG)  levothyroxine  metroNIDAZOLE  IVPB  pantoprazole  Injectable  predniSONE  Solution  sodium chloride  vancomycin    Solution    Pertinent Labs: 11-04 @ 06:29: Na 129[L], BUN 17, Cr <0.30[L], [H], K+ 4.5, Phos 3.5, Mg 2.0, Alk Phos 106, ALT/SGPT 19, AST/SGOT 26, HbA1c --    A1C with Estimated Average Glucose Result: 5.8 % (10-14-24 @ 05:08)  A1C with Estimated Average Glucose Result: 6.4 % (08-12-24 @ 07:32)    Finger Sticks:  POCT Blood Glucose.: 235 mg/dL (11-04 @ 11:57)  POCT Blood Glucose.: 129 mg/dL (11-04 @ 05:40)  POCT Blood Glucose.: 138 mg/dL (11-03 @ 23:41)  POCT Blood Glucose.: 266 mg/dL (11-03 @ 17:48)    NUTRITIONALLY PERTINENT MEDICATIONS/LABS:  [x] Reviewed  [x] Relevant notes on medications/labs:  -iron low (10/30/24)   -hyponatremia noted   -intermittent episodes of hyperglycemia and hypoglycemia noted. remains on 12 hour feeding (06:00 until 18:00 or until full volume completed) and endocrinology following. Insulin regimen deferred to endocrine/team.     EDEMA:  [x] Reviewed  [x] Relevant notes:  per nursing flow sheets:  trace right arm     GI/ I&O:  [x] Reviewed  [] Relevant notes:  [x] Other: c.diff positive. diarrhea.     SKIN:   sacrum stage IV per flow sheets    ESTIMATED NEEDS:  Based on: dosing weight 54.4kg   30-35kcal/kg 1632-1904kcal/day  1.4-1.8g/kg 76-98g protein/day  defer fluid needs to team     NUTRITION DIAGNOSIS:  [x] Prior Dx: increased nutrient needs  [] New Dx:    EDUCATION:  [] Yes:  [x] Not appropriate/warranted  10/15/24 Nutrition Chart Note: Spoke with pt daughter Marysol Spann over phone ([phone]) to confirm pt home tube feeding regimen. Per daughter, pt receiving [employer] Peptide 1.5, 3 bottles/day. 80ml/hr x 12 hours.     NUTRITION CARE PLAN:  1. Diet: Can continue [employer] Peptide 1.5 for a total volume of 960ml. Rate/hr deferred to team. Can continue banatrol to aid in stool bulking, prosource to aid in added protein/calories for wound healing and Alfred to aid in wound healing.   -->Insulin regimen deferred to team/endocrinology   -->Defer free water flush to team   -->Do not mix prosource and banatrol together.   -->Alfred to be mixed with a minimum of 4ox of water per [employer]   2. Continue vitamin/mineral regimen as ordered per team   3. Consider iron supplement if no contraindications    4. Consider obtaining updated weight     [] Achieved - Continue current nutrition intervention(s)  [] Current medical condition precludes nutrition intervention at this time.    MONITORING AND EVALUATION:   RD remains available upon request and will follow up per protocol.    Rufina Morris, MS, RD, CDN / Teams

## 2024-11-04 NOTE — PROGRESS NOTE ADULT - NS ATTEND AMEND GEN_ALL_CORE FT
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 Found to have Pseudomonas aeruginosa in sputum culture and urine culture with ESBL E. coli s/p course of meropenem. Course complicated by antibiotic-associated diarrhea.          - C/W current pain regiment, well controlled. C/W escitalopram and seroquel, will add tylenol for fever control  - continue lung protective ventilation. Pressure support trials as tolerates. Does poorly on PS trials. Continue Duonebs.   - cont tx for cdiff, f/u ID reccs (prior cx's w/ PsA sputum and esbl ecoli uti)  - monitor BM frequency  - Anemia multifactoral, hemorrhoids, AOCD. s/p EGD without active bleeding, f/u Heme reccs  - cont to monitor FS    -  RA, on home prednisone 5 mg daily. Hold off on Enbrel for now  - monitor hypoNa, tsh normal   - DVT ppx- only scd for now given significant anemia a few days ago  -Dispo: full code, prognosis guarded, discussed with patient and daughter Marysol

## 2024-11-04 NOTE — PROGRESS NOTE ADULT - ASSESSMENT
73 yo F PMH T2DM on insulin, HTN, HLD, hypothyroidism, RA on Prednisone, Fibromyalgia, cerebral aneurysm s/p repair, chronic hypercapnic respiratory failure s/p trach (vent dependent) and chronic PEG 2022, recurrent C. diff infection (follows with Dr. Newman), persistent GJ tube leaks now s/p GC fistula repair 8/12 with hypoxia, PNA, with anemia    #anemia, AOCD + phlebotomy  - pt with multiple chronic issues causing AOCD with acute worsening in context of acute illness + phlebotomy  - was fecal occult + on admission but now no overt bleeding  - no renal insufficiency  - s/p PRBC 10/10, 10/30  - iron studies 10/30 with AOCD, no evidence of hemolysis  - WBC/plt stable; plt with some fluctuation, currently normal  - B12/folate normal  - continue to monitor Hg  - discussed with daughter prior that unfortunately no simple treatment for anemia of chronic disease besides transfusions as needed

## 2024-11-04 NOTE — PROGRESS NOTE ADULT - ASSESSMENT
73 yo F w/h/o controlled T2DM (A1C 5.8%) on insulin at home. Also HTN, HLD, hypothyroidism, RA on Prednisone, Fibromyalgia, cerebral aneurysm s/p repair, chronic hypercapnic respiratory failure s/p trach (vent dependent) and chronic PEG 2022, recurrent C. diff infection (follows with Dr. 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 S/p antibiotics and s/p GJ tube exchanges on 10/14, s/p PEG replacement 10/21.     Endocrine consulted for Type 2 DM management while on tube feeding.   She continues on tube feeding ([employer] Peptide 1.5 ([id number]) at 80 ml/hr)  for 12 hours.   Will adjust insulin doses to keep BG at goal of 100-180mg/dl       Home DM medications: Lantus 35 units at HS, Humalog 26 units with # 1 feeding, 22 units with #2 tube feeding, 20 units with #3 tube feeding and  Humalog correction scale (  2 units for -414, 4 units for -250, 6 units for -300, 8units for -350, 10 units for -400, 12 units for -450)   while on [employer] formula 3 cans/day, slow bolus( run at 80 ml/hr total volume 960 ml/day> 1st can around 6:30-8:30, 2nd can around 3 pm, 3rd can around 8-9 pm) plus Banatrol 1/2 packet BID, was increasing to 1 packet BID, plus Kefir 10 oz/day ( divided in multiple times throughout the day).

## 2024-11-04 NOTE — PROGRESS NOTE ADULT - PROBLEM SELECTOR PLAN 10
- Patient with Sacral wound prior to admission  - Continue local wound care  - frequent turning and repositioning - Patient on Sodium Chloride tabs prior to admission   - Remains on small Volumes of free h20 flushes to prevent J -Tube from clogging  - Will increase NACL Tabs 1 gram q 8 hrs   - Monitor BMP

## 2024-11-04 NOTE — PROGRESS NOTE ADULT - SUBJECTIVE AND OBJECTIVE BOX
Patient is a 74y old  Female who presents with a chief complaint of Patient admitted with Hypoxemia and episode of Bright red blood per rectum (03 Nov 2024 15:12)      Interval Events: No events reported overnight     REVIEW OF SYSTEMS:  [ ] Positive  [ ] All other systems negative  [ ] Unable to assess ROS because ________    Vital Signs Last 24 Hrs  T(C): 37.3 (11-04-24 @ 05:55), Max: 37.3 (11-04-24 @ 05:55)  T(F): 99.2 (11-04-24 @ 05:55), Max: 99.2 (11-04-24 @ 05:55)  HR: 97 (11-04-24 @ 06:06) (80 - 102)  BP: 155/63 (11-04-24 @ 05:55) (106/51 - 155/63)  RR: 16 (11-04-24 @ 05:55) (16 - 18)  SpO2: 100% (11-04-24 @ 06:06) (99% - 100%)    PHYSICAL EXAM:  HEENT:   [ ]Tracheostomy:  [ ]Pupils equal  [ ]No oral lesions  [ ]Abnormal    SKIN  [ ]No Rash  [ ] Abnormal  [ ] pressure    CARDIAC  [ ]Regular  [ ]Abnormal    PULMONARY  [ ]Bilateral Clear Breath Sounds  [ ]Normal Excursion  [ ]Abnormal    GI  [ ]PEG      [ ] +BS		              [ ]Soft, nondistended, nontender	  [ ]Abnormal    MUSCULOSKELETAL                                   [ ]Bedbound                 [ ]Abnormal    [ ]Ambulatory/OOB to chair                           EXTREMITIES                                         [ ]Normal  [ ]Edema                           NEUROLOGIC  [ ] Normal, non focal  [ ] Focal findings:    PSYCHIATRIC  [ ]Alert and appropriate  [ ] Sedated	 [ ]Agitated    :  Mcbride: [ ] Yes, if yes: Date of Placement:                   [  ] No    LINES: Central Lines [ ] Yes, if yes: Date of Placement                                     [  ] No    HOSPITAL MEDICATIONS:  MEDICATIONS  (STANDING):  albuterol/ipratropium for Nebulization 3 milliLiter(s) Nebulizer every 6 hours  artificial  tears Solution 1 Drop(s) Both EYES every 6 hours  ascorbic acid 500 milliGRAM(s) Oral daily  Biotene Dry Mouth Oral Rinse 5 milliLiter(s) Swish and Spit every 6 hours  calcium carbonate 1250 mG  + Vitamin D (OsCal 500 + D) 1 Tablet(s) Oral <User Schedule>  chlorhexidine 0.12% Liquid 15 milliLiter(s) Oral Mucosa every 12 hours  chlorhexidine 4% Liquid 1 Application(s) Topical daily  dextrose 5%. 1000 milliLiter(s) (50 mL/Hr) IV Continuous <Continuous>  dextrose 5%. 1000 milliLiter(s) (100 mL/Hr) IV Continuous <Continuous>  dextrose 5%. 1000 milliLiter(s) (50 mL/Hr) IV Continuous <Continuous>  dextrose 5%. 1000 milliLiter(s) (100 mL/Hr) IV Continuous <Continuous>  dextrose 50% Injectable 25 Gram(s) IV Push once  dextrose 50% Injectable 12.5 Gram(s) IV Push once  dextrose 50% Injectable 25 Gram(s) IV Push once  diclofenac sodium 1% Gel 2 Gram(s) Topical every 6 hours  escitalopram 10 milliGRAM(s) Oral <User Schedule>  fentaNYL   Patch  12 MICROgram(s)/Hr 1 Patch Transdermal every 72 hours  fentaNYL   Patch  25 MICROgram(s)/Hr 1 Patch Transdermal every 72 hours  gabapentin Solution 250 milliGRAM(s) Oral <User Schedule>  glucagon  Injectable 1 milliGRAM(s) IntraMuscular once  glucagon  Injectable 1 milliGRAM(s) IntraMuscular once  hemorrhoidal Ointment 1 Application(s) Rectal at bedtime  influenza  Vaccine (HIGH DOSE) 0.5 milliLiter(s) IntraMuscular once  insulin glargine Injectable (LANTUS) 14 Unit(s) SubCutaneous <User Schedule>  insulin lispro (ADMELOG) corrective regimen sliding scale   SubCutaneous every 6 hours  insulin lispro Injectable (ADMELOG) 30 Unit(s) SubCutaneous <User Schedule>  insulin lispro Injectable (ADMELOG) 18 Unit(s) SubCutaneous <User Schedule>  lactobacillus acidophilus 1 Tablet(s) Oral <User Schedule>  levothyroxine 125 MICROGram(s) Oral <User Schedule>  lidocaine   4% Patch 4 Patch Transdermal every 24 hours  melatonin 12 milliGRAM(s) Oral <User Schedule>  metroNIDAZOLE  IVPB 500 milliGRAM(s) IV Intermittent every 8 hours  nystatin Powder 1 Application(s) Topical every 8 hours  pantoprazole  Injectable 40 milliGRAM(s) IV Push every 12 hours  predniSONE  Solution 5 milliGRAM(s) Enteral Tube <User Schedule>  QUEtiapine 12.5 milliGRAM(s) Oral <User Schedule>  sodium chloride 1 Gram(s) Oral every 12 hours  sodium chloride 0.65% Nasal 1 Spray(s) Both Nostrils every 6 hours  vancomycin    Solution 125 milliGRAM(s) Oral every 6 hours  witch hazel Pads 1 Application(s) Topical every 12 hours    MEDICATIONS  (PRN):  aluminum hydroxide/magnesium hydroxide/simethicone Suspension 30 milliLiter(s) Enteral Tube every 4 hours PRN Dyspepsia  oxyCODONE    Solution 2.5 milliGRAM(s) Oral every 6 hours PRN Mild Pain (1 - 3)  oxyCODONE    Solution 5 milliGRAM(s) Oral every 6 hours PRN Moderate Pain (4 - 6)  oxyCODONE    Solution 10 milliGRAM(s) Oral every 6 hours PRN Severe Pain (7 - 10)      LABS:                        8.5    12.33 )-----------( 179      ( 04 Nov 2024 06:29 )             28.2     11-04    129[L]  |  97  |  17  ----------------------------<  131[H]  4.5   |  20[L]  |  <0.30[L]    Ca    8.4      04 Nov 2024 06:29  Phos  3.5     11-04  Mg     2.0     11-04    TPro  6.2  /  Alb  2.4[L]  /  TBili  0.7  /  DBili  x   /  AST  26  /  ALT  19  /  AlkPhos  106  11-04      Urinalysis Basic - ( 04 Nov 2024 06:29 )    Color: x / Appearance: x / SG: x / pH: x  Gluc: 131 mg/dL / Ketone: x  / Bili: x / Urobili: x   Blood: x / Protein: x / Nitrite: x   Leuk Esterase: x / RBC: x / WBC x   Sq Epi: x / Non Sq Epi: x / Bacteria: x          CAPILLARY BLOOD GLUCOSE    MICROBIOLOGY:     RADIOLOGY:  [ ] Reviewed and interpreted by me    Mode: AC/ CMV (Assist Control/ Continuous Mandatory Ventilation)  RR (machine): 18  TV (machine): 350  FiO2: 30  PEEP: 5  ITime: 0.7  MAP: 11  PIP: 27   Patient is a 74y old  Female who presents with a chief complaint of Patient admitted with Hypoxemia and episode of Bright red blood per rectum (03 Nov 2024 15:12)      Interval Events: No events reported overnight     REVIEW OF SYSTEMS:  [ ] Positive  [ ] All other systems negative  [x] Unable to assess ROS because unable verbal questioning     Vital Signs Last 24 Hrs  T(C): 37.3 (11-04-24 @ 05:55), Max: 37.3 (11-04-24 @ 05:55)  T(F): 99.2 (11-04-24 @ 05:55), Max: 99.2 (11-04-24 @ 05:55)  HR: 97 (11-04-24 @ 06:06) (80 - 102)  BP: 155/63 (11-04-24 @ 05:55) (106/51 - 155/63)  RR: 16 (11-04-24 @ 05:55) (16 - 18)  SpO2: 100% (11-04-24 @ 06:06) (99% - 100%)    PHYSICAL EXAM:  HEENT:   [X]Tracheostomy: #8 XLT cuffed shiley   [X]Pupils equal  [ ]No oral lesions  [ ]Abnormal    SKIN  [ ]No Rash  [ ] Abnormal  [X] pressure - sacral unstageable    CARDIAC  [X]Regular  [ ]Abnormal    PULMONARY  [ ]Bilateral Clear Breath Sounds  [ ]Normal Excursion  [X]Abnormal - mild coarse BS     GI  [X]PEG      [X] +BS		              [X]Soft, nondistended, nontender	  [X]Abnormal - old PEG site c/d/i     MUSCULOSKELETAL                                   [X]Bedbound                 [ ]Abnormal    [ ]Ambulatory/OOB to chair                           EXTREMITIES                                         [ ]Normal  [X]Edema - mild generalized pitting edema                          NEUROLOGIC  [ ] Normal, non focal  [X] Focal findings:  lethargic but following commands    PSYCHIATRIC  [x] Easily arousable, lethargic  [ ] Sedated	 [ ]Agitated    :  Mcbride: [ ] Yes, if yes: Date of Placement:                   [X] No    LINES: Central Lines [ ] Yes, if yes: Date of Placement                                     [X] No    HOSPITAL MEDICATIONS:  MEDICATIONS  (STANDING):  albuterol/ipratropium for Nebulization 3 milliLiter(s) Nebulizer every 6 hours  artificial  tears Solution 1 Drop(s) Both EYES every 6 hours  ascorbic acid 500 milliGRAM(s) Oral daily  Biotene Dry Mouth Oral Rinse 5 milliLiter(s) Swish and Spit every 6 hours  calcium carbonate 1250 mG  + Vitamin D (OsCal 500 + D) 1 Tablet(s) Oral <User Schedule>  chlorhexidine 0.12% Liquid 15 milliLiter(s) Oral Mucosa every 12 hours  chlorhexidine 4% Liquid 1 Application(s) Topical daily  dextrose 5%. 1000 milliLiter(s) (50 mL/Hr) IV Continuous <Continuous>  dextrose 5%. 1000 milliLiter(s) (100 mL/Hr) IV Continuous <Continuous>  dextrose 5%. 1000 milliLiter(s) (50 mL/Hr) IV Continuous <Continuous>  dextrose 5%. 1000 milliLiter(s) (100 mL/Hr) IV Continuous <Continuous>  dextrose 50% Injectable 25 Gram(s) IV Push once  dextrose 50% Injectable 12.5 Gram(s) IV Push once  dextrose 50% Injectable 25 Gram(s) IV Push once  diclofenac sodium 1% Gel 2 Gram(s) Topical every 6 hours  escitalopram 10 milliGRAM(s) Oral <User Schedule>  fentaNYL   Patch  12 MICROgram(s)/Hr 1 Patch Transdermal every 72 hours  fentaNYL   Patch  25 MICROgram(s)/Hr 1 Patch Transdermal every 72 hours  gabapentin Solution 250 milliGRAM(s) Oral <User Schedule>  glucagon  Injectable 1 milliGRAM(s) IntraMuscular once  glucagon  Injectable 1 milliGRAM(s) IntraMuscular once  hemorrhoidal Ointment 1 Application(s) Rectal at bedtime  influenza  Vaccine (HIGH DOSE) 0.5 milliLiter(s) IntraMuscular once  insulin glargine Injectable (LANTUS) 14 Unit(s) SubCutaneous <User Schedule>  insulin lispro (ADMELOG) corrective regimen sliding scale   SubCutaneous every 6 hours  insulin lispro Injectable (ADMELOG) 30 Unit(s) SubCutaneous <User Schedule>  insulin lispro Injectable (ADMELOG) 18 Unit(s) SubCutaneous <User Schedule>  lactobacillus acidophilus 1 Tablet(s) Oral <User Schedule>  levothyroxine 125 MICROGram(s) Oral <User Schedule>  lidocaine   4% Patch 4 Patch Transdermal every 24 hours  melatonin 12 milliGRAM(s) Oral <User Schedule>  metroNIDAZOLE  IVPB 500 milliGRAM(s) IV Intermittent every 8 hours  nystatin Powder 1 Application(s) Topical every 8 hours  pantoprazole  Injectable 40 milliGRAM(s) IV Push every 12 hours  predniSONE  Solution 5 milliGRAM(s) Enteral Tube <User Schedule>  QUEtiapine 12.5 milliGRAM(s) Oral <User Schedule>  sodium chloride 1 Gram(s) Oral every 12 hours  sodium chloride 0.65% Nasal 1 Spray(s) Both Nostrils every 6 hours  vancomycin    Solution 125 milliGRAM(s) Oral every 6 hours  witch hazel Pads 1 Application(s) Topical every 12 hours    MEDICATIONS  (PRN):  aluminum hydroxide/magnesium hydroxide/simethicone Suspension 30 milliLiter(s) Enteral Tube every 4 hours PRN Dyspepsia  oxyCODONE    Solution 2.5 milliGRAM(s) Oral every 6 hours PRN Mild Pain (1 - 3)  oxyCODONE    Solution 5 milliGRAM(s) Oral every 6 hours PRN Moderate Pain (4 - 6)  oxyCODONE    Solution 10 milliGRAM(s) Oral every 6 hours PRN Severe Pain (7 - 10)      LABS:                        8.5    12.33 )-----------( 179      ( 04 Nov 2024 06:29 )             28.2     11-04    129[L]  |  97  |  17  ----------------------------<  131[H]  4.5   |  20[L]  |  <0.30[L]    Ca    8.4      04 Nov 2024 06:29  Phos  3.5     11-04  Mg     2.0     11-04    TPro  6.2  /  Alb  2.4[L]  /  TBili  0.7  /  DBili  x   /  AST  26  /  ALT  19  /  AlkPhos  106  11-04      Urinalysis Basic - ( 04 Nov 2024 06:29 )    Color: x / Appearance: x / SG: x / pH: x  Gluc: 131 mg/dL / Ketone: x  / Bili: x / Urobili: x   Blood: x / Protein: x / Nitrite: x   Leuk Esterase: x / RBC: x / WBC x   Sq Epi: x / Non Sq Epi: x / Bacteria: x          CAPILLARY BLOOD GLUCOSE    MICROBIOLOGY:     RADIOLOGY:  [ ] Reviewed and interpreted by me    Mode: AC/ CMV (Assist Control/ Continuous Mandatory Ventilation)  RR (machine): 18  TV (machine): 350  FiO2: 30  PEEP: 5  ITime: 0.7  MAP: 11  PIP: 27

## 2024-11-04 NOTE — PROGRESS NOTE ADULT - SUBJECTIVE AND OBJECTIVE BOX
Follow Up:  fevers    Interval History/ROS: lethargic,  Pulmonary team notes reports of only modest diarrhea    Allergies  walnut (Unknown)  metronidazole (Rash)  Lyrica (Unknown)  penicillin (Unknown)  Pineapple (Unknown)  Tagamet (Unknown)  heparin (Unknown)  Pecans (Unknown)  Hazelnut (Unknown)  meropenem (Rash)        ANTIMICROBIALS:  metroNIDAZOLE  IVPB 500 every 8 hours  vancomycin    Solution 125 every 6 hours      OTHER MEDS:  MEDICATIONS  (STANDING):  acetaminophen   Oral Liquid .. 650 every 6 hours PRN  albuterol/ipratropium for Nebulization 3 every 6 hours  aluminum hydroxide/magnesium hydroxide/simethicone Suspension 30 every 4 hours PRN  dextrose 50% Injectable 25 once  dextrose 50% Injectable 12.5 once  dextrose 50% Injectable 25 once  escitalopram 10 <User Schedule>  gabapentin Solution 250 <User Schedule>  glucagon  Injectable 1 once  glucagon  Injectable 1 once  influenza  Vaccine (HIGH DOSE) 0.5 once  insulin glargine Injectable (LANTUS) 14 <User Schedule>  insulin lispro (ADMELOG) corrective regimen sliding scale  every 6 hours  levothyroxine 125 <User Schedule>  melatonin 12 <User Schedule>  oxyCODONE    Solution 2.5 every 6 hours PRN  oxyCODONE    Solution 5 every 6 hours PRN  oxyCODONE    Solution 10 every 6 hours PRN  pantoprazole  Injectable 40 every 12 hours  predniSONE  Solution 5 <User Schedule>  QUEtiapine 12.5 <User Schedule>      Vital Signs Last 24 Hrs  T(C): 36.8 (04 Nov 2024 13:18), Max: 39 (04 Nov 2024 12:00)  T(F): 98.3 (04 Nov 2024 13:18), Max: 102.2 (04 Nov 2024 12:00)  HR: 95 (04 Nov 2024 17:19) (80 - 117)  BP: 119/57 (04 Nov 2024 12:00) (119/57 - 155/63)  BP(mean): --  RR: 18 (04 Nov 2024 12:00) (16 - 18)  SpO2: 100% (04 Nov 2024 17:19) (99% - 100%)    Parameters below as of 04 Nov 2024 17:19  Patient On (Oxygen Delivery Method): ventilator        PHYSICAL EXAM:  General: ill appearing, NAD, Non-toxic  Neurology: A&Ox3, nonfocal  Respiratory: coarse rhonchi  CV: RRR, S1S2, no murmurs, rubs or gallops  Abdominal: Soft, Non-tender, non-distended, normal bowel sounds  Extremities: trace edema  Line Sites: Clear  Skin: No rash                          8.5    12.33 )-----------( 179      ( 04 Nov 2024 06:29 )             28.2   WBC Count: 12.33 (11-04 @ 06:29)  WBC Count: 15.12 (11-03 @ 07:21)  WBC Count: 15.69 (11-02 @ 10:09)  WBC Count: 14.86 (11-01 @ 06:15)  WBC Count: 11.31 (10-31 @ 08:21)      11-04    129[L]  |  97  |  17  ----------------------------<  131[H]  4.5   |  20[L]  |  <0.30[L]    Ca    8.4      04 Nov 2024 06:29  Phos  3.5     11-04  Mg     2.0     11-04    TPro  6.2  /  Alb  2.4[L]  /  TBili  0.7  /  DBili  x   /  AST  26  /  ALT  19  /  AlkPhos  106  11-04      Urinalysis Basic - ( 04 Nov 2024 06:29 )    Color: x / Appearance: x / SG: x / pH: x  Gluc: 131 mg/dL / Ketone: x  / Bili: x / Urobili: x   Blood: x / Protein: x / Nitrite: x   Leuk Esterase: x / RBC: x / WBC x   Sq Epi: x / Non Sq Epi: x / Bacteria: x        MICROBIOLOGY:  Clean Catch Clean Catch (Midstream)  11-03-24   No growth  --  --      .Blood BLOOD  11-03-24   No growth at 24 hours  --  --      .Blood BLOOD  11-03-24   No growth at 24 hours  --  --      .Blood BLOOD  11-01-24   Growth in anaerobic bottle: Staphylococcus epidermidis  Isolation of Coagulase negative Staphylococcus from single blood culture  sets may represent  contamination. Contact the Microbiology Department at [phone] if  susceptibility testing is needed.  clinically indicated.  Direct identification is available within approximately 3-5  hours either by Blood Panel Multiplexed PCR or Direct  MALDI-TOF. Details: [email or web address]  --  Blood Culture PCR      Catheterized Catheterized  11-01-24   No growth  --  --      .Blood BLOOD  11-01-24   No growth at 72 Hours  --  --      Trach Asp Tracheal Aspirate  10-27-24   Moderate Pseudomonas aeruginosa  Moderate Pseudomonas aeruginosa #2  Multiple Morphological Strains  Commensal vinay consistent with body site  --  Pseudomonas aeruginosa  Pseudomonas aeruginosa      .Blood BLOOD  10-27-24   No growth at 5 days  --  --      .Blood BLOOD  10-17-24   No growth at 5 days  --  --      .Blood BLOOD  10-17-24   No growth at 5 days  --  --      .Blood BLOOD  10-15-24   No growth at 5 days  --  --      Trach Asp Tracheal Aspirate  10-15-24   Mixed gram negative rods including  Numerous Pseudomonas aeruginosa  Commensal vinay consistent with body site  --  Pseudomonas aeruginosa      .Blood BLOOD  10-15-24   No growth at 5 days  --  --      Trach Asp Tracheal Aspirate  10-08-24   Few Pseudomonas aeruginosa  Commensal vinay consistent with body site  --  Pseudomonas aeruginosa      Clean Catch Clean Catch (Midstream)  10-07-24   10,000 - 49,000 CFU/mL Escherichia coli ESBL  --  Escherichia coli ESBL      .Blood BLOOD  10-07-24   No growth at 5 days  --  --      .Blood BLOOD  10-07-24   No growth at 5 days  --  --      Clostridium difficile GDH Toxins A&amp;B, EIA:   Positive (11-01-24 @ 13:44)  Clostridium difficile GDH Interpretation: Positive for toxigenic C. Difficile.  This specimen is positive for C.  Difficile glutamate dehydrogenase (GDH) antigen and positive for C.  Difficile Toxins A & B, by EIA.   (11-01-24 @ 13:44)      RADIOLOGY:  < from: Xray Abdomen 1 View PORTABLE -Routine (Xray Abdomen 1 View PORTABLE -Routine .) (11.01.24 @ 19:54) >  IMPRESSION:  Gastrojejunostomy tube in place.  Nonobstructive bowel gas pattern.    < end of copied text >  < from: Xray Chest 1 View- PORTABLE-Urgent (Xray Chest 1 View- PORTABLE-Urgent .) (11.01.24 @ 11:28) >  IMPRESSION:  Improved aeration of bilateral lung fields, with interval decrease in   size of bilateral pleural effusions.    < end of copied text >      Zion Newman MD; Division of Infectious Disease; Pager: [phone]; nights and weekends: [phone]

## 2024-11-04 NOTE — PROGRESS NOTE ADULT - PROBLEM SELECTOR PLAN 5
LDL goal <70 due to DM  Pt LDL NA  Order fasting lipid if not recently done  Statin as per primary team. Not getting statin at this time   Manage per primary team   F/u levels as out pt      Contact via [employer] Teams during business hours  To reach covering provider access AMION via sunrise tools  For Urgent matters/after-hours/weekends/holidays please page endocrine fellow on call   For nonurgent matters please email [email or web address]    Please note that this patient may be followed by different provider tomorrow.  Notify endocrine 24 hours prior to discharge for final recommendations

## 2024-11-04 NOTE — PROGRESS NOTE ADULT - SUBJECTIVE AND OBJECTIVE BOX
Chief Complaint:  FU anemia    History of Present Illness:   was awake earlier; currently resting, responds briefly to voice; Aide at bedside, full ROS not obtainable; no bleeding reported      MEDICATIONS  (STANDING):  albuterol/ipratropium for Nebulization 3 milliLiter(s) Nebulizer every 6 hours  artificial  tears Solution 1 Drop(s) Both EYES every 6 hours  ascorbic acid 500 milliGRAM(s) Oral daily  Biotene Dry Mouth Oral Rinse 5 milliLiter(s) Swish and Spit every 6 hours  calcium carbonate 1250 mG  + Vitamin D (OsCal 500 + D) 1 Tablet(s) Oral <User Schedule>  chlorhexidine 0.12% Liquid 15 milliLiter(s) Oral Mucosa every 12 hours  chlorhexidine 4% Liquid 1 Application(s) Topical daily  dextrose 5%. 1000 milliLiter(s) (100 mL/Hr) IV Continuous <Continuous>  dextrose 5%. 1000 milliLiter(s) (50 mL/Hr) IV Continuous <Continuous>  dextrose 5%. 1000 milliLiter(s) (100 mL/Hr) IV Continuous <Continuous>  dextrose 5%. 1000 milliLiter(s) (50 mL/Hr) IV Continuous <Continuous>  dextrose 50% Injectable 25 Gram(s) IV Push once  dextrose 50% Injectable 12.5 Gram(s) IV Push once  dextrose 50% Injectable 25 Gram(s) IV Push once  diclofenac sodium 1% Gel 2 Gram(s) Topical every 6 hours  escitalopram 10 milliGRAM(s) Oral <User Schedule>  fentaNYL   Patch  12 MICROgram(s)/Hr 1 Patch Transdermal every 72 hours  fentaNYL   Patch  25 MICROgram(s)/Hr 1 Patch Transdermal every 72 hours  gabapentin Solution 250 milliGRAM(s) Oral <User Schedule>  glucagon  Injectable 1 milliGRAM(s) IntraMuscular once  glucagon  Injectable 1 milliGRAM(s) IntraMuscular once  hemorrhoidal Ointment 1 Application(s) Rectal at bedtime  influenza  Vaccine (HIGH DOSE) 0.5 milliLiter(s) IntraMuscular once  insulin glargine Injectable (LANTUS) 14 Unit(s) SubCutaneous <User Schedule>  insulin lispro (ADMELOG) corrective regimen sliding scale   SubCutaneous every 6 hours  insulin lispro Injectable (ADMELOG) 30 Unit(s) SubCutaneous <User Schedule>  insulin lispro Injectable (ADMELOG) 18 Unit(s) SubCutaneous <User Schedule>  lactobacillus acidophilus 1 Tablet(s) Oral <User Schedule>  levothyroxine 125 MICROGram(s) Oral <User Schedule>  lidocaine   4% Patch 4 Patch Transdermal every 24 hours  melatonin 12 milliGRAM(s) Oral <User Schedule>  metroNIDAZOLE  IVPB 500 milliGRAM(s) IV Intermittent every 8 hours  nystatin Powder 1 Application(s) Topical every 8 hours  pantoprazole  Injectable 40 milliGRAM(s) IV Push every 12 hours  predniSONE  Solution 5 milliGRAM(s) Enteral Tube <User Schedule>  QUEtiapine 12.5 milliGRAM(s) Oral <User Schedule>  sodium chloride 1 Gram(s) Oral every 12 hours  sodium chloride 0.65% Nasal 1 Spray(s) Both Nostrils every 6 hours  vancomycin    Solution 125 milliGRAM(s) Oral every 6 hours  witch hazel Pads 1 Application(s) Topical every 12 hours    MEDICATIONS  (PRN):  aluminum hydroxide/magnesium hydroxide/simethicone Suspension 30 milliLiter(s) Enteral Tube every 4 hours PRN Dyspepsia  oxyCODONE    Solution 10 milliGRAM(s) Oral every 6 hours PRN Severe Pain (7 - 10)  oxyCODONE    Solution 2.5 milliGRAM(s) Oral every 6 hours PRN Mild Pain (1 - 3)  oxyCODONE    Solution 5 milliGRAM(s) Oral every 6 hours PRN Moderate Pain (4 - 6)      Allergies    walnut (Unknown)  metronidazole (Rash)  Lyrica (Unknown)  penicillin (Unknown)  Pineapple (Unknown)  Tagamet (Unknown)  heparin (Unknown)  Pecans (Unknown)  Hazelnut (Unknown)  meropenem (Rash)    Intolerances        Vital Signs Last 24 Hrs  T(C): 37.3 (04 Nov 2024 05:55), Max: 37.3 (04 Nov 2024 05:55)  T(F): 99.2 (04 Nov 2024 05:55), Max: 99.2 (04 Nov 2024 05:55)  HR: 117 (04 Nov 2024 08:36) (80 - 117)  BP: 155/63 (04 Nov 2024 05:55) (106/51 - 155/63)  BP(mean): --  RR: 16 (04 Nov 2024 05:55) (16 - 18)  SpO2: 100% (04 Nov 2024 08:36) (99% - 100%)    Parameters below as of 04 Nov 2024 06:06  Patient On (Oxygen Delivery Method): ventilator        PHYSICAL EXAM  General: adult in NAD  HEENT: trach  Neck: supple  CV: normal S1/S2   Lungs: clear to auscultation  Abdomen: soft non-tender non-distended, positive bowel sounds  Ext: no calf tenderness  Lymph Nodes: No LAD in neck    LABS:                          8.5    12.33 )-----------( 179      ( 04 Nov 2024 06:29 )             28.2         Mean Cell Volume : 89.8 fl  Mean Cell Hemoglobin : 27.1 pg  Mean Cell Hemoglobin Concentration : 30.1 g/dL  Auto Neutrophil # : x  Auto Lymphocyte # : x  Auto Monocyte # : x  Auto Eosinophil # : x  Auto Basophil # : x  Auto Neutrophil % : x  Auto Lymphocyte % : x  Auto Monocyte % : x  Auto Eosinophil % : x  Auto Basophil % : x      Serial CBC's  11-04 @ 06:29  Hct-28.2 / Hgb-8.5 / Plat-179 / RBC-3.14 / WBC-12.33  Serial CBC's  11-03 @ 07:21  Hct-28.1 / Hgb-8.5 / Plat-164 / RBC-3.12 / WBC-15.12  Serial CBC's  11-02 @ 10:09  Hct-32.6 / Hgb-9.4 / Plat-139 / RBC-3.52 / WBC-15.69  Serial CBC's  11-01 @ 06:15  Hct-30.7 / Hgb-9.2 / Plat-158 / RBC-3.48 / WBC-14.86      11-04    129[L]  |  97  |  17  ----------------------------<  131[H]  4.5   |  20[L]  |  <0.30[L]    Ca    8.4      04 Nov 2024 06:29  Phos  3.5     11-04  Mg     2.0     11-04    TPro  6.2  /  Alb  2.4[L]  /  TBili  0.7  /  DBili  x   /  AST  26  /  ALT  19  /  AlkPhos  106  11-04          Folate, Serum: 12.1 ng/mL (11-01 @ 06:15)  Vitamin B12, Serum: 862 pg/mL (11-01 @ 06:15)  Ferritin: 177 ng/mL (10-30 @ 06:37)  Iron - Total Binding Capacity.: 162 ug/dL (10-30 @ 06:37)  Reticulocyte Percent: 3.6 % (10-30 @ 06:36)              Radiology:

## 2024-11-04 NOTE — PROGRESS NOTE ADULT - ASSESSMENT
73 yo F PMH T2DM on insulin, HTN, HLD, hypothyroidism, RA on Prednisone, Fibromyalgia, cerebral aneurysm s/p repair, chronic hypercapnic respiratory failure s/p trach (vent dependent) and chronic PEG 2022, recurrent C. diff infection (follows with Dr. 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  Pt also noted to have rectal bleeding this past week requiring transfusion 5 days ago in ED as per daughter. Daughter also reports brownish discharge from G-J tube. In ED, pt afebrile, fiO2 96-98% on 40% on ventilator.  Chest X-ray w/ opacification of right lung concerning for possible PNA.  Admitted to RCU for further management. Sputum cxs grew PSA; treated with course of Cefepime. Patient with decreased H+H received 1 unit of PRBCS on 10/10.  10/14 EGD w/ Dr. Rosales for PEGJ exchange and clippings placed for closure of fistula site.  Persistent fevers treated with meropenem and vanc (d/c'd 10/20).  CT A/P without infectious source.  Continued fevers and persistent leukocytosis 11/1 CDiff positive - po vanco started, IV flagyl added 11/3.  Continue airway management with duonebs, chest PT and suction PRN.         11/4: Patent with Fever this afternoon 102.2; Urine Cx 11/3: NGTD, Bld cx 11/1: MR Tristan Epi. Repeat CXR Performed and Sputum cx ordered. Currently remains on Flagyl and Vanco. Patient with hyponatremia Sodium chloride tabs increased to 1 gram q 8hrs.

## 2024-11-04 NOTE — PROGRESS NOTE ADULT - PROBLEM SELECTOR PLAN 2
[] PNA  - CXR 10/7: Opacification of the right middle lobe may represent pneumonia versus atelectasis  - sputum culture 10/8 PSA  - completed course of cefepime on 10/12  - CT A/P 10/18: ?multifocal pneumonia, presence of gastrocutaneous fistula; otherwise no collections/abscess noted  - Txd with meropenem 10/15-10/23; d/c'd for persistent diarrhea  - Currently remains afebrile off abx    #fevers  - cdiff + 11/1   - po vanco / flagyl [] PNA  - CXR 10/7: Opacification of the right middle lobe may represent pneumonia versus atelectasis  - sputum culture 10/8 PSA  - completed course of cefepime on 10/12  - CT A/P 10/18: ?multifocal pneumonia, presence of gastrocutaneous fistula; otherwise no collections/abscess noted  - Txd with meropenem 10/15-10/23; d/c'd for persistent diarrhea    [] C.Diff / Fevers  - cdiff + 11/1   - Febrile 102.2 Today   - Bld cx 11/1: MR Tristan Epi, Repeat Bld cx 11/3: NGTD  - Urine cx 11/3: NGTD, Sputum cx 11/4: ( Sent/ Results pending )  - F/u Repeat CXR   - Cont po Vanco / Flagyl  - ID Continues to follow

## 2024-11-04 NOTE — PROGRESS NOTE ADULT - PROBLEM SELECTOR PLAN 1
- Please monitor blood glucose values q 6 hours while on tube feeding or NPO  - Continue Lantus to 14 units at 6 am.   - Increase Admelog to 20 u at 0600 (if FBG <100s administer 9 units instead, Hold if TFs are on hold)  - Increase Admelog to 32units at 1200 ( if BG < 100 give 15 units instead, hold if TFs are on hold)   - Continue Moderate dose Admelog correctional scale q6h while on TFs and overnight, change to low dose if holding Tube feeding   - Please keep hypoglycemia protocol in place  Discharge Plan:  - TBD depending on insulin requirement and discharge tube feeding regimen (Bolus vs continuous tube feeding)   - If bolus tube feeding > Lantus plus Humalog   - Patient should have BGs checked 4x a day while on TFs.    Daughter aware to contact Endocrinologist if BG <70mg/dL x1 or >200mg/dL consistently or >400mg/dL x1.   - Followup with Dr Ruth: [facility]: [address] [place] [address]. [address]. [place] [address]. Tel: [phone] Telemedicine- daughter to schedule.   - Make sure pt has Rx for all DM supplies and insulin

## 2024-11-04 NOTE — PROGRESS NOTE ADULT - PROBLEM SELECTOR PLAN 11
- D/C planning when medically stable  - Patients daughter updated over the phone by RCU Team - Patient with Sacral wound prior to admission  - Continue local wound care  - frequent turning and repositioning

## 2024-11-04 NOTE — PROGRESS NOTE ADULT - NUTRITIONAL ASSESSMENT
Diet, NPO with Tube Feed:   Tube Feeding Modality: Jejunostomy  [employer] Peptide 1.5 (KFPEPT1.5RTH)  Total Volume for 24 Hours (mL): 960  Continuous  Until Goal Tube Feed Rate (mL per Hour): 80  Tube Feed Duration (in Hours): 12  Tube Feed Start Time: 06:00  Free Water Flush  Pump   Rate (mL per Hour): 50   Frequency: Every Hour  Free Water Flush Instructions:  50ml free water flushes Q1hr  Alfred(7 Gm Arginine/7 Gm Glut/1.2 Gm HMB     Qty per Day:  2  No Carb Prosource (1pkg = 15gms Protein)     Qty per Day:  1  Banatrol TF     Qty per Day:  1/2 packet per daily (11-01-24 @ 08:28) [Active]

## 2024-11-04 NOTE — PROGRESS NOTE ADULT - ASSESSMENT
75 yo woman PMH T2DM on insulin, HTN, HLD, hypothyroidism, RA on Prednisone, Fibromyalgia, cerebral aneurysm s/p repair, chronic hypercapnic respiratory failure s/p trach (vent dependent) and chronic PEG 2022, recurrent C. diff infection , persistent GJ tube leaks now s/p GC fistula repair 8/12  admitted 10/7/24 with resp distress and found to have RML opacity     Antibiotics  Ceftriaxone 10/7  Azithro 10/7  Cefepime 10/7--> 10/12  meropenem 10/15 --> 10/23  IV Vanco 10/17 --> 10/21  Vanco via NGT 10/24; 11/1-->  Metronidazole 11/3 -->      10/10 melena, anemia, blood tx  10/14 EGD for GJ exchange and piror PEG site closure  One benign-appearing, intrinsic moderate stenosis was found in the upper third of the        esophagus. The stenosis was traversed.       The cardia and gastric fundus were normal.       A gastric tube with tip in the jejunum was found in the gastric body. The PEG required        removal because it was leaking. The J tube extension (12F) was removed first. An externally        removable 24 Fr EndoVive Safety gastrostomy tube was lubricated. The guide wire was passed        through the existing G-tube port and snared endoscopically. The endoscope and snare were then        removed, pulling the wire out through the mouth. The g-tube was tied to the guidewire, pulled        through the mouth into the stomach and then pulled out from the stomach through the skin. The        bumper was attached to the gastrostomy tube. The feeding tube was then cut to an appropriate        length. The final position of the gastrostomy tube was confirmed by relook endoscopy, and        skin marking noted to be 3 cm at the external bumper. The final tension and compression of        the abdominal wall by the PEG tube and external bumper were checked and revealed that the        bumper was moderately tight and mildly deforming the skin. The J tube extension (12 F        EndoVive Jejunal feeding tube) was advanced into the stomach. The tip of the J tube had a        loop thread that was captured with a Resolution clip. The thread and the J tube extension        were advanced to the proximal jejunum, and the endoclip along with the tip of the J tube was        attached to the wall securely. The J tube extension was capped, and the tube site was cleaned        and dressed.       A small fistula was found on the anterior wall of the gastric body related to prior G tube        site. Coagulation of tissue near the fistula using argon plasma at 1.2 liters/minute and 35        peraza was successful. To closure the gastrocutaneous fistula, four hemostatic clips were        successfully placed (MR conditional, two 16 mm DuraClip and 2 Mantis clips.       The examined duodenum was normal.    10/14, 10/15 Fever, transient hypoxia post procedure  10/15 ProCalcitonin = 8.48 suggestive of bacterial process; BCx x2 NGTD; Trach: Pseudomonas   - though benign mg stain  10;16 Leukocytosis WBC = 14.26  10/17 fever, hypotension, abd distension, no diarrhea noted this am WBC = 15.02; Rising Procalcitonin = 38.49; Obstructed J tube   10/18  lost IV access re-gained  - CT scan late this afternoon  WBC = 8.68; Rising Procalcitonin >100  10/18 imaging suggests multifocal pneumonia  10/21 improved chest exam, Procalcitonin level considerably decreased, decreased FiO2  - afebrile since 10/18  10/21 UGI endoscopy done  Findings:       The esophagus was normal. A fistula was found on the anterior wall of the gastric body.       There was evidence of a gastrostomy present in the gastric body. The patient was placed in        the supine position. The endoscope was advanced to the jejunum and the prior placed J tube        with loop attached to the wall and the loop thread was cut by endoclip. The J tube and the G        tube were removed. The gastrostomy fistula was utilized and an Avanos 22 F        Gastrostomy/Jejunal tube was advanced. The jejunal tip was advanced through the pylorus and        into the duodenum. The endoscope was then advanced to the duodenum and the loop thread at the        tip of the J tube was captured and advanced to the proximal jejunum. The loop thread was        clipped to wall by a [employer] Resolution clip. The J tube flushed easily and the G        tube decompress the abdomen easily. The internal balloon was insufflated with 10 cc of water        to hold the GJ tube in place. The outside marking was at 3.  10/23  no distress, liquid stools noted  - Meropenem discontinued  GI PCR NEG  10/24  persistent diarrhea  Cdiff NEG; enteral vanco stopped and Immodium provided  10/28 low grade temps, mild leukocytosis  10/30 blood transfusion  11/1  fever  +Cdiff  11/3 continued fever  - +BC Stph epi most likely procurement contaminant,  modest diarrhea reported  - daughter described increased diarrhea in evenings - Pseudomonas airway colonization is long term, no suggestion of pneumonia at present, sacral decub remains superifical will granulated without signs of infection  11/4  - fever, abnormal chest exam though FIO2 still 30%    Issues  recurrent Cdiff  cerebral aneurysm s/p repair  chronic hypercapnic respiratory failure s/p trach (vent dependent) and chronic PEG 2022  anemia  - had iron deficiency  Pseudomonas aeruginosa upper airway colonization  (most recent isolate Resist to Cipro/Levo)  T2DM on insulin  HTN  HLD  hypothyroidism  RA on Prednisone  Fibromyalgia  debility  sacral ulcer largely healed  malnourished/chronic catabolic state      Suggest  Continue vanco 125 q 6 hours via J tube 11/1 -->  Add Flagyl for Cdiff  consider chest imaging    if patient improves and able to be discharged, I will consider Bezlotoxumab (Zinplava) 500 mg over 1 hour IV at  home to prevent Cdiff recurrence      discussed with ACP

## 2024-11-05 NOTE — PROGRESS NOTE ADULT - SUBJECTIVE AND OBJECTIVE BOX
Seen earlier today with daughter at bedside    Chief Complaint: Diabetes Mellitus follow up    INTERVAL HX: Currently on 12 hour tube feeding ( [employer]ie [employer] Peptide 1.5 (KFPEPT1.5RTH) 80ml/hr for total 960 ml/day). Has rectal tube in place for diarrhea i/s/o C- diff positive. Noted fever 101.2 today. Last 24 hour BGs 100s-200s with fasting  this am and elevated BGs 200s while on tube feeding         Review of Systems:  General: As above  GI: No nausea, vomiting, + diarrhea   Endocrine: no  S&Sx of hypoglycemia    Allergies    walnut (Unknown)  metronidazole (Rash)  Lyrica (Unknown)  penicillin (Unknown)  Pineapple (Unknown)  Tagamet (Unknown)  heparin (Unknown)  Pecans (Unknown)  Hazelnut (Unknown)  meropenem (Rash)    Intolerances      MEDICATIONS  (STANDING):  albuterol/ipratropium for Nebulization 3 milliLiter(s) Nebulizer every 6 hours  artificial  tears Solution 1 Drop(s) Both EYES every 6 hours  ascorbic acid 500 milliGRAM(s) Oral daily  Biotene Dry Mouth Oral Rinse 5 milliLiter(s) Swish and Spit every 6 hours  calcium carbonate 1250 mG  + Vitamin D (OsCal 500 + D) 1 Tablet(s) Oral <User Schedule>  chlorhexidine 0.12% Liquid 15 milliLiter(s) Oral Mucosa every 12 hours  chlorhexidine 4% Liquid 1 Application(s) Topical daily  dextrose 5%. 1000 milliLiter(s) (50 mL/Hr) IV Continuous <Continuous>  dextrose 5%. 1000 milliLiter(s) (100 mL/Hr) IV Continuous <Continuous>  dextrose 5%. 1000 milliLiter(s) (50 mL/Hr) IV Continuous <Continuous>  dextrose 5%. 1000 milliLiter(s) (100 mL/Hr) IV Continuous <Continuous>  dextrose 50% Injectable 25 Gram(s) IV Push once  dextrose 50% Injectable 25 Gram(s) IV Push once  dextrose 50% Injectable 12.5 Gram(s) IV Push once  diclofenac sodium 1% Gel 2 Gram(s) Topical every 6 hours  escitalopram 10 milliGRAM(s) Oral <User Schedule>  fentaNYL   Patch  12 MICROgram(s)/Hr 1 Patch Transdermal every 72 hours  fentaNYL   Patch  25 MICROgram(s)/Hr 1 Patch Transdermal every 72 hours  gabapentin Solution 250 milliGRAM(s) Oral <User Schedule>  glucagon  Injectable 1 milliGRAM(s) IntraMuscular once  glucagon  Injectable 1 milliGRAM(s) IntraMuscular once  hemorrhoidal Ointment 1 Application(s) Rectal at bedtime  influenza  Vaccine (HIGH DOSE) 0.5 milliLiter(s) IntraMuscular once  insulin glargine Injectable (LANTUS) 14 Unit(s) SubCutaneous <User Schedule>  insulin lispro (ADMELOG) corrective regimen sliding scale   SubCutaneous every 6 hours  insulin lispro Injectable (ADMELOG) 32 Unit(s) SubCutaneous <User Schedule>  insulin lispro Injectable (ADMELOG) 20 Unit(s) SubCutaneous <User Schedule>  lactobacillus acidophilus 1 Tablet(s) Oral <User Schedule>  levothyroxine 125 MICROGram(s) Oral <User Schedule>  lidocaine   4% Patch 4 Patch Transdermal every 24 hours  melatonin 12 milliGRAM(s) Oral <User Schedule>  metroNIDAZOLE  IVPB 500 milliGRAM(s) IV Intermittent every 8 hours  nystatin Powder 1 Application(s) Topical every 8 hours  pantoprazole  Injectable 40 milliGRAM(s) IV Push every 12 hours  predniSONE  Solution 5 milliGRAM(s) Enteral Tube <User Schedule>  QUEtiapine 12.5 milliGRAM(s) Oral <User Schedule>  sodium chloride 1 Gram(s) Oral every 8 hours  sodium chloride 0.65% Nasal 1 Spray(s) Both Nostrils every 6 hours  vancomycin    Solution 125 milliGRAM(s) Oral every 6 hours  witch hazel Pads 1 Application(s) Topical every 12 hours        insulin glargine Injectable (LANTUS)   14 Unit(s) SubCutaneous (11-05-24 @ 06:42)    insulin lispro (ADMELOG) corrective regimen sliding scale   6 Unit(s) SubCutaneous (11-05-24 @ 12:18)   6 Unit(s) SubCutaneous (11-04-24 @ 18:18)    insulin lispro Injectable (ADMELOG)   32 Unit(s) SubCutaneous (11-05-24 @ 12:19)    insulin lispro Injectable (ADMELOG)   20 Unit(s) SubCutaneous (11-05-24 @ 08:13)    levothyroxine   125 MICROGram(s) Oral (11-05-24 @ 05:00)    predniSONE  Solution   5 milliGRAM(s) Enteral Tube (11-05-24 @ 11:42)        PHYSICAL EXAM:  VITALS: T(C): 36.8 (11-05-24 @ 13:05)  T(F): 98.3 (11-05-24 @ 13:05), Max: 101.2 (11-05-24 @ 11:55)  HR: 95 (11-05-24 @ 14:35) (75 - 116)  BP: 108/52 (11-05-24 @ 12:00) (108/52 - 165/73)  RR:  (18 - 18)  SpO2:  (100% - 100%)  Wt(kg): --  GENERAL: Female laying in bed, in NAD  Respiratory: +Trach with ventilator support   Extremities: Warm, no edema  NEURO: Alert, follows simple commands, nods yes or no    LABS:  POCT Blood Glucose.: 276 mg/dL (11-05-24 @ 11:59)  POCT Blood Glucose.: 223 mg/dL (11-05-24 @ 08:11)  POCT Blood Glucose.: 133 mg/dL (11-05-24 @ 06:00)  POCT Blood Glucose.: 142 mg/dL (11-05-24 @ 00:26)  POCT Blood Glucose.: 280 mg/dL (11-04-24 @ 17:38)  POCT Blood Glucose.: 235 mg/dL (11-04-24 @ 11:57)  POCT Blood Glucose.: 129 mg/dL (11-04-24 @ 05:40)  POCT Blood Glucose.: 138 mg/dL (11-03-24 @ 23:41)  POCT Blood Glucose.: 266 mg/dL (11-03-24 @ 17:48)  POCT Blood Glucose.: 189 mg/dL (11-03-24 @ 11:31)  POCT Blood Glucose.: 143 mg/dL (11-03-24 @ 06:00)  POCT Blood Glucose.: 133 mg/dL (11-02-24 @ 23:52)  POCT Blood Glucose.: 325 mg/dL (11-02-24 @ 17:33)                          9.0    13.29 )-----------( 202 ( 05 Nov 2024 07:05 )             31.1     11-05    130[L]  |  98  |  15  ----------------------------<  150[H]  4.7   |  18[L]  |  <0.30[L]    Ca    8.0[L]      05 Nov 2024 07:05  Phos  3.8     11-05  Mg     1.6     11-05    TPro  6.6  /  Alb  2.6[L]  /  TBili  0.6  /  DBili  x   /  AST  31  /  ALT  20  /  AlkPhos  115  11-05    eGFR: 113 mL/min/1.73m2 (05 Nov 2024 07:05)      Thyroid Function Tests:  10-16 @ 06:50 TSH 1.74 FreeT4 1.2 T3 -- Anti TPO -- Anti Thyroglobulin Ab -- TSI --      A1C with Estimated Average Glucose Result: 5.8 % (10-14-24 @ 05:08)  A1C with Estimated Average Glucose Result: 6.4 % (08-12-24 @ 07:32)    Estimated Average Glucose: 120 mg/dL (10-14-24 @ 05:08)  Estimated Average Glucose: 137 mg/dL (08-12-24 @ 07:32)        Diet, NPO with Tube Feed:   Tube Feeding Modality: Jejunostomy  [employer] Peptide 1.5 (KFPEPT1.5RTH)  Total Volume for 24 Hours (mL): 960  Continuous  Until Goal Tube Feed Rate (mL per Hour): 80  Tube Feed Duration (in Hours): 12  Tube Feed Start Time: 06:00  Free Water Flush  Pump   Rate (mL per Hour): 50   Frequency: Every Hour  Free Water Flush Instructions:  50ml free water flushes Q1hr  Alfred(7 Gm Arginine/7 Gm Glut/1.2 Gm HMB     Qty per Day:  2  No Carb Prosource (1pkg = 15gms Protein)     Qty per Day:  1  Banatrol TF     Qty per Day:  1/2 packet per daily (11-01-24 @ 08:28) [Active]

## 2024-11-05 NOTE — PROGRESS NOTE ADULT - PROBLEM SELECTOR PLAN 10
- Patient on Sodium Chloride tabs prior to admission   - Remains on small Volumes of free h20 flushes to prevent J -Tube from clogging  - Will increase NACL Tabs 1 gram q 8 hrs   - Monitor BMP

## 2024-11-05 NOTE — PROGRESS NOTE ADULT - ASSESSMENT
75 yo F w/h/o controlled T2DM (A1C 5.8%) on insulin at home. Also HTN, HLD, hypothyroidism, RA on Prednisone, Fibromyalgia, cerebral aneurysm s/p repair, chronic hypercapnic respiratory failure s/p trach (vent dependent) and chronic PEG 2022, recurrent C. diff infection (follows with Dr. 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 S/p antibiotics and s/p GJ tube exchanges on 10/14, s/p PEG replacement 10/21.   Endocrine consulted for Type 2 DM management while on tube feeding.   She continues on tube feeding ([employer] Peptide 1.5 ([id number]) at 80 ml/hr)  for 12 hours.   Last 24 hour BGs 133-200s. fasting BG at goal but BGs 200s while on tube feeding . Will adjust insulin doses to keep BG at goal of 100-180mg/dl       Home DM medications: Lantus 35 units at HS, Humalog 26 units with # 1 feeding, 22 units with #2 tube feeding, 20 units with #3 tube feeding and  Humalog correction scale (  2 units for -278, 4 units for -250, 6 units for -300, 8units for -350, 10 units for -400, 12 units for -450)   while on [employer] formula 3 cans/day, slow bolus( run at 80 ml/hr total volume 960 ml/day> 1st can around 6:30-8:30, 2nd can around 3 pm, 3rd can around 8-9 pm) plus Banatrol 1/2 packet BID, was increasing to 1 packet BID, plus Kefir 10 oz/day ( divided in multiple times throughout the day).

## 2024-11-05 NOTE — PROGRESS NOTE ADULT - ASSESSMENT
73 yo F PMH T2DM on insulin, HTN, HLD, hypothyroidism, RA on Prednisone, Fibromyalgia, cerebral aneurysm s/p repair, chronic hypercapnic respiratory failure s/p trach (vent dependent) and chronic PEG 2022, recurrent C. diff infection (follows with Dr. 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  Pt also noted to have rectal bleeding this past week requiring transfusion 5 days ago in ED as per daughter. Daughter also reports brownish discharge from G-J tube. In ED, pt afebrile, fiO2 96-98% on 40% on ventilator.  Chest X-ray w/ opacification of right lung concerning for possible PNA.  Admitted to [facility] for further management. Sputum cxs grew PSA; treated with course of Cefepime. Patient with decreased H+H received 1 unit of PRBCS on 10/10.  10/14 EGD w/ Dr. Rosales for PEGJ exchange and clippings placed for closure of fistula site.  Persistent fevers treated with meropenem and vanc (d/c'd 10/20).  CT A/P without infectious source.  Continued fevers and persistent leukocytosis 11/1 CDiff positive - po vanco started, IV flagyl added 11/3.  Continue airway management with duonebs, chest PT and suction PRN.         11/4: Patent with Fever this afternoon 102.2; Urine Cx 11/3: NGTD, Bld cx 11/1: MR Tristan Epi. Repeat CXR Performed and Sputum cx ordered. Currently remains on Flagyl and Vanco. Patient with hyponatremia Sodium chloride tabs increased to 1 gram q 8hrs.  73 yo F PMH T2DM on insulin, HTN, HLD, hypothyroidism, RA on Prednisone, Fibromyalgia, cerebral aneurysm s/p repair, chronic hypercapnic respiratory failure s/p trach (vent dependent) and chronic PEG 2022, recurrent C. diff infection (follows with Dr. 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  Pt also noted to have rectal bleeding this past week requiring transfusion 5 days ago in ED as per daughter. Daughter also reports brownish discharge from G-J tube. In ED, pt afebrile, fiO2 96-98% on 40% on ventilator.  Chest X-ray w/ opacification of right lung concerning for possible PNA.  Admitted to RCU for further management. Sputum cxs grew PSA; treated with course of Cefepime. Patient with decreased H+H received 1 unit of PRBCS on 10/10.  10/14 EGD w/ Dr. Rosales for PEGJ exchange and clippings placed for closure of fistula site.  Persistent fevers treated with meropenem and vanc (d/c'd 10/20).  CT A/P without infectious source.  Continued fevers and persistent leukocytosis 11/1 CDiff positive - po vanco started, IV flagyl added 11/3.  Continue airway management with duonebs, chest PT and suction PRN.            11/5:  Low grade fever today - tylenol given.  Blood cultures/Urine culture 11/3 NGTD.  Sputum culture still pending.  Rectal tube placed overnight - liquid diarrhea in bag.  Pt more lethargic as per family members and bedside aides - concerned that the flagyl may be causing her lethargy.  Discussed with Dr. Newman likely not the flagyl contributing to the lethargy.  According to family, there was also a spike in BP w/ SBPs to 200s (undocumented episode?).  If pt has another episode of hypertension will consider CT H.  75 yo F PMH T2DM on insulin, HTN, HLD, hypothyroidism, RA on Prednisone, Fibromyalgia, cerebral aneurysm s/p repair, chronic hypercapnic respiratory failure s/p trach (vent dependent) and chronic PEG 2022, recurrent C. diff infection (follows with Dr. 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  Pt also noted to have rectal bleeding this past week requiring transfusion 5 days ago in ED as per daughter. Daughter also reports brownish discharge from G-J tube. In ED, pt afebrile, fiO2 96-98% on 40% on ventilator.  Chest X-ray w/ opacification of right lung concerning for possible PNA.  Admitted to RCU for further management. Sputum cxs grew PSA; treated with course of Cefepime. Patient with decreased H+H received 1 unit of PRBCS on 10/10.  10/14 EGD w/ Dr. Rosales for PEGJ exchange and clippings placed for closure of fistula site.  Persistent fevers treated with meropenem and vanc (d/c'd 10/20).  CT A/P without infectious source.  Continued fevers and persistent leukocytosis 11/1 CDiff positive - po vanco started, IV flagyl added 11/3.  Continue airway management with duonebs, chest PT and suction PRN.            11/5:  Low grade fever today - tylenol given.  Blood cultures/Urine culture 11/3 NGTD.  Sputum culture still pending.  Rectal tube placed overnight - liquid diarrhea in bag.  Pt more lethargic as per family members and bedside aides - concerned that the flagyl may be causing her lethargy.  Discussed with Dr. Newman likely not the flagyl contributing to the lethargy.  According to family, there was also a spike in BP w/ SBPs to 200s (undocumented episode?).  Ordered ABG - reviewed, results do not appear to contribute to AMS.  Ordered CT H, C/A/P (elevated lactate on gas).  Reached out to Dr. Newman again in regards to empirically treating elevated lactate, did not start antibiotics.

## 2024-11-05 NOTE — PROGRESS NOTE ADULT - PROBLEM SELECTOR PLAN 5
LDL goal <70 due to DM  Pt LDL NA  Order fasting lipid if not recently done  Statin as per primary team. Not getting statin at this time   Manage per primary team   F/u levels as out pt      Contact via [employer] Teams during business hours  To reach covering provider access AMION via [employer] tools  For Urgent matters/after-hours/weekends/holidays please page endocrine fellow on call   For nonurgent matters please email [email or web address]    Please note that this patient may be followed by different provider tomorrow.  Notify endocrine 24 hours prior to discharge for final recommendations

## 2024-11-05 NOTE — PROGRESS NOTE ADULT - SUBJECTIVE AND OBJECTIVE BOX
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 650 milliGRAM(s) Oral every 6 hours PRN Temp greater or equal to 38C (100.4F), Mild Pain (1 - 3)  albuterol/ipratropium for Nebulization 3 milliLiter(s) Nebulizer every 6 hours  aluminum hydroxide/magnesium hydroxide/simethicone Suspension 30 milliLiter(s) Enteral Tube every 4 hours PRN Dyspepsia  artificial  tears Solution 1 Drop(s) Both EYES every 6 hours  ascorbic acid 500 milliGRAM(s) Oral daily  Biotene Dry Mouth Oral Rinse 5 milliLiter(s) Swish and Spit every 6 hours  calcium carbonate 1250 mG  + Vitamin D (OsCal 500 + D) 1 Tablet(s) Oral <User Schedule>  chlorhexidine 0.12% Liquid 15 milliLiter(s) Oral Mucosa every 12 hours  chlorhexidine 4% Liquid 1 Application(s) Topical daily  dextrose 5%. 1000 milliLiter(s) IV Continuous <Continuous>  dextrose 5%. 1000 milliLiter(s) IV Continuous <Continuous>  dextrose 5%. 1000 milliLiter(s) IV Continuous <Continuous>  dextrose 5%. 1000 milliLiter(s) IV Continuous <Continuous>  dextrose 50% Injectable 25 Gram(s) IV Push once  dextrose 50% Injectable 25 Gram(s) IV Push once  dextrose 50% Injectable 12.5 Gram(s) IV Push once  diclofenac sodium 1% Gel 2 Gram(s) Topical every 6 hours  escitalopram 10 milliGRAM(s) Oral <User Schedule>  fentaNYL   Patch  12 MICROgram(s)/Hr 1 Patch Transdermal every 72 hours  fentaNYL   Patch  25 MICROgram(s)/Hr 1 Patch Transdermal every 72 hours  gabapentin Solution 250 milliGRAM(s) Oral <User Schedule>  glucagon  Injectable 1 milliGRAM(s) IntraMuscular once  glucagon  Injectable 1 milliGRAM(s) IntraMuscular once  hemorrhoidal Ointment 1 Application(s) Rectal at bedtime  influenza  Vaccine (HIGH DOSE) 0.5 milliLiter(s) IntraMuscular once  insulin glargine Injectable (LANTUS) 14 Unit(s) SubCutaneous <User Schedule>  insulin lispro (ADMELOG) corrective regimen sliding scale   SubCutaneous every 6 hours  insulin lispro Injectable (ADMELOG) 32 Unit(s) SubCutaneous <User Schedule>  insulin lispro Injectable (ADMELOG) 20 Unit(s) SubCutaneous <User Schedule>  lactobacillus acidophilus 1 Tablet(s) Oral <User Schedule>  levothyroxine 125 MICROGram(s) Oral <User Schedule>  lidocaine   4% Patch 4 Patch Transdermal every 24 hours  melatonin 12 milliGRAM(s) Oral <User Schedule>  metroNIDAZOLE  IVPB 500 milliGRAM(s) IV Intermittent every 8 hours  nystatin Powder 1 Application(s) Topical every 8 hours  oxyCODONE    Solution 10 milliGRAM(s) Oral every 6 hours PRN Severe Pain (7 - 10)  oxyCODONE    Solution 2.5 milliGRAM(s) Oral every 6 hours PRN Mild Pain (1 - 3)  oxyCODONE    Solution 5 milliGRAM(s) Oral every 6 hours PRN Moderate Pain (4 - 6)  pantoprazole  Injectable 40 milliGRAM(s) IV Push every 12 hours  predniSONE  Solution 5 milliGRAM(s) Enteral Tube <User Schedule>  QUEtiapine 12.5 milliGRAM(s) Oral <User Schedule>  sodium chloride 1 Gram(s) Oral every 8 hours  sodium chloride 0.65% Nasal 1 Spray(s) Both Nostrils every 6 hours  vancomycin    Solution 125 milliGRAM(s) Oral every 6 hours  witch hazel Pads 1 Application(s) Topical every 12 hours    Labs:  CBC Full  -  ( 05 Nov 2024 07:05 )  WBC Count : 13.29 K/uL  RBC Count : 3.50 M/uL  Hemoglobin : 9.0 g/dL  Hematocrit : 31.1 %  Platelet Count - Automated : 202 K/uL  Mean Cell Volume : 88.9 fl  Mean Cell Hemoglobin : 25.7 pg  Mean Cell Hemoglobin Concentration : 28.9 g/dL  Auto Neutrophil # : x  Auto Lymphocyte # : x  Auto Monocyte # : x  Auto Eosinophil # : x  Auto Basophil # : x  Auto Neutrophil % : x  Auto Lymphocyte % : x  Auto Monocyte % : x  Auto Eosinophil % : x  Auto Basophil % : x    11-05    130[L]  |  98  |  15  ----------------------------<  150[H]  4.7   |  18[L]  |  <0.30[L]    Ca    8.0[L]      05 Nov 2024 07:05  Phos  3.8     11-05  Mg     1.6     11-05    TPro  6.6  /  Alb  2.6[L]  /  TBili  0.6  /  DBili  x   /  AST  31  /  ALT  20  /  AlkPhos  115  11-05      Radiology:             ROS:  Patient comfortable without distress  Diarrhea minimal as per chart  Vital Signs Last 24 Hrs  T(C): 36.8 (05 Nov 2024 13:05), Max: 38.4 (05 Nov 2024 11:55)  T(F): 98.3 (05 Nov 2024 13:05), Max: 101.2 (05 Nov 2024 11:55)  HR: 115 (05 Nov 2024 12:04) (75 - 116)  BP: 108/52 (05 Nov 2024 12:00) (108/52 - 165/73)  BP(mean): --  RR: 18 (05 Nov 2024 12:00) (18 - 18)  SpO2: 100% (05 Nov 2024 12:04) (100% - 100%)    Parameters below as of 05 Nov 2024 12:04  Patient On (Oxygen Delivery Method): ventilator        Physical exam:    No distress  CVS: S1, S2   Chest: bilateral breath sound without rales  Abdomen: soft, not tender, no organomegaly or masses  Musculoskeletal:  Normal range of motion  Skin: No rash    Assessment and Plan: Followed for anemia    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 650 milliGRAM(s) Oral every 6 hours PRN Temp greater or equal to 38C (100.4F), Mild Pain (1 - 3)  albuterol/ipratropium for Nebulization 3 milliLiter(s) Nebulizer every 6 hours  aluminum hydroxide/magnesium hydroxide/simethicone Suspension 30 milliLiter(s) Enteral Tube every 4 hours PRN Dyspepsia  artificial  tears Solution 1 Drop(s) Both EYES every 6 hours  ascorbic acid 500 milliGRAM(s) Oral daily  Biotene Dry Mouth Oral Rinse 5 milliLiter(s) Swish and Spit every 6 hours  calcium carbonate 1250 mG  + Vitamin D (OsCal 500 + D) 1 Tablet(s) Oral <User Schedule>  chlorhexidine 0.12% Liquid 15 milliLiter(s) Oral Mucosa every 12 hours  chlorhexidine 4% Liquid 1 Application(s) Topical daily  dextrose 5%. 1000 milliLiter(s) IV Continuous <Continuous>  dextrose 5%. 1000 milliLiter(s) IV Continuous <Continuous>  dextrose 5%. 1000 milliLiter(s) IV Continuous <Continuous>  dextrose 5%. 1000 milliLiter(s) IV Continuous <Continuous>  dextrose 50% Injectable 25 Gram(s) IV Push once  dextrose 50% Injectable 25 Gram(s) IV Push once  dextrose 50% Injectable 12.5 Gram(s) IV Push once  diclofenac sodium 1% Gel 2 Gram(s) Topical every 6 hours  escitalopram 10 milliGRAM(s) Oral <User Schedule>  fentaNYL   Patch  12 MICROgram(s)/Hr 1 Patch Transdermal every 72 hours  fentaNYL   Patch  25 MICROgram(s)/Hr 1 Patch Transdermal every 72 hours  gabapentin Solution 250 milliGRAM(s) Oral <User Schedule>  glucagon  Injectable 1 milliGRAM(s) IntraMuscular once  glucagon  Injectable 1 milliGRAM(s) IntraMuscular once  hemorrhoidal Ointment 1 Application(s) Rectal at bedtime  influenza  Vaccine (HIGH DOSE) 0.5 milliLiter(s) IntraMuscular once  insulin glargine Injectable (LANTUS) 14 Unit(s) SubCutaneous <User Schedule>  insulin lispro (ADMELOG) corrective regimen sliding scale   SubCutaneous every 6 hours  insulin lispro Injectable (ADMELOG) 32 Unit(s) SubCutaneous <User Schedule>  insulin lispro Injectable (ADMELOG) 20 Unit(s) SubCutaneous <User Schedule>  lactobacillus acidophilus 1 Tablet(s) Oral <User Schedule>  levothyroxine 125 MICROGram(s) Oral <User Schedule>  lidocaine   4% Patch 4 Patch Transdermal every 24 hours  melatonin 12 milliGRAM(s) Oral <User Schedule>  metroNIDAZOLE  IVPB 500 milliGRAM(s) IV Intermittent every 8 hours  nystatin Powder 1 Application(s) Topical every 8 hours  oxyCODONE    Solution 10 milliGRAM(s) Oral every 6 hours PRN Severe Pain (7 - 10)  oxyCODONE    Solution 2.5 milliGRAM(s) Oral every 6 hours PRN Mild Pain (1 - 3)  oxyCODONE    Solution 5 milliGRAM(s) Oral every 6 hours PRN Moderate Pain (4 - 6)  pantoprazole  Injectable 40 milliGRAM(s) IV Push every 12 hours  predniSONE  Solution 5 milliGRAM(s) Enteral Tube <User Schedule>  QUEtiapine 12.5 milliGRAM(s) Oral <User Schedule>  sodium chloride 1 Gram(s) Oral every 8 hours  sodium chloride 0.65% Nasal 1 Spray(s) Both Nostrils every 6 hours  vancomycin    Solution 125 milliGRAM(s) Oral every 6 hours  witch hazel Pads 1 Application(s) Topical every 12 hours    Labs:  CBC Full  -  ( 05 Nov 2024 07:05 )  WBC Count : 13.29 K/uL  RBC Count : 3.50 M/uL  Hemoglobin : 9.0 g/dL  Hematocrit : 31.1 %  Platelet Count - Automated : 202 K/uL  Mean Cell Volume : 88.9 fl  Mean Cell Hemoglobin : 25.7 pg  Mean Cell Hemoglobin Concentration : 28.9 g/dL  Auto Neutrophil # : x  Auto Lymphocyte # : x  Auto Monocyte # : x  Auto Eosinophil # : x  Auto Basophil # : x  Auto Neutrophil % : x  Auto Lymphocyte % : x  Auto Monocyte % : x  Auto Eosinophil % : x  Auto Basophil % : x    11-05    130[L]  |  98  |  15  ----------------------------<  150[H]  4.7   |  18[L]  |  <0.30[L]    Ca    8.0[L]      05 Nov 2024 07:05  Phos  3.8     11-05  Mg     1.6     11-05    TPro  6.6  /  Alb  2.6[L]  /  TBili  0.6  /  DBili  x   /  AST  31  /  ALT  20  /  AlkPhos  115  11-05      Radiology:             ROS:  Patient comfortable without distress  Patients daughter stated she was more alert yesterday  Diarrhea minimal as per chart  Vital Signs Last 24 Hrs  T(C): 36.8 (05 Nov 2024 13:05), Max: 38.4 (05 Nov 2024 11:55)  T(F): 98.3 (05 Nov 2024 13:05), Max: 101.2 (05 Nov 2024 11:55)  HR: 115 (05 Nov 2024 12:04) (75 - 116)  BP: 108/52 (05 Nov 2024 12:00) (108/52 - 165/73)  BP(mean): --  RR: 18 (05 Nov 2024 12:00) (18 - 18)  SpO2: 100% (05 Nov 2024 12:04) (100% - 100%)    Parameters below as of 05 Nov 2024 12:04  Patient On (Oxygen Delivery Method): ventilator        Physical exam:    As in chart  Assessment and Plan:

## 2024-11-05 NOTE — PROGRESS NOTE ADULT - SUBJECTIVE AND OBJECTIVE BOX
Patient is a 74y old  Female who presents with a chief complaint of Patient admitted with Hypoxemia and episode of Bright red blood per rectum (04 Nov 2024 18:18)      Interval Events:    REVIEW OF SYSTEMS:  [ ] Positive  [ ] All other systems negative  [ ] Unable to assess ROS because ________    Vital Signs Last 24 Hrs  T(C): 36.9 (11-05-24 @ 05:45), Max: 39 (11-04-24 @ 12:00)  T(F): 98.4 (11-05-24 @ 05:45), Max: 102.2 (11-04-24 @ 12:00)  HR: 100 (11-05-24 @ 06:05) (75 - 117)  BP: 165/73 (11-05-24 @ 05:45) (113/48 - 165/73)  RR: 18 (11-05-24 @ 05:45) (18 - 18)  SpO2: 100% (11-05-24 @ 06:05) (100% - 100%)    PHYSICAL EXAM:  HEENT:   [ ]Tracheostomy:  [ ]Pupils equal  [ ]No oral lesions  [ ]Abnormal    SKIN  [ ]No Rash  [ ] Abnormal  [ ] pressure    CARDIAC  [ ]Regular  [ ]Abnormal    PULMONARY  [ ]Bilateral Clear Breath Sounds  [ ]Normal Excursion  [ ]Abnormal    GI  [ ]PEG      [ ] +BS		              [ ]Soft, nondistended, nontender	  [ ]Abnormal    MUSCULOSKELETAL                                   [ ]Bedbound                 [ ]Abnormal    [ ]Ambulatory/OOB to chair                           EXTREMITIES                                         [ ]Normal  [ ]Edema                           NEUROLOGIC  [ ] Normal, non focal  [ ] Focal findings:    PSYCHIATRIC  [ ]Alert and appropriate  [ ] Sedated	 [ ]Agitated    :  Mcbride: [ ] Yes, if yes: Date of Placement:                   [  ] No    LINES: Central Lines [ ] Yes, if yes: Date of Placement                                     [  ] No    HOSPITAL MEDICATIONS:  MEDICATIONS  (STANDING):  albuterol/ipratropium for Nebulization 3 milliLiter(s) Nebulizer every 6 hours  artificial  tears Solution 1 Drop(s) Both EYES every 6 hours  ascorbic acid 500 milliGRAM(s) Oral daily  Biotene Dry Mouth Oral Rinse 5 milliLiter(s) Swish and Spit every 6 hours  calcium carbonate 1250 mG  + Vitamin D (OsCal 500 + D) 1 Tablet(s) Oral <User Schedule>  chlorhexidine 0.12% Liquid 15 milliLiter(s) Oral Mucosa every 12 hours  chlorhexidine 4% Liquid 1 Application(s) Topical daily  dextrose 5%. 1000 milliLiter(s) (50 mL/Hr) IV Continuous <Continuous>  dextrose 5%. 1000 milliLiter(s) (100 mL/Hr) IV Continuous <Continuous>  dextrose 5%. 1000 milliLiter(s) (100 mL/Hr) IV Continuous <Continuous>  dextrose 5%. 1000 milliLiter(s) (50 mL/Hr) IV Continuous <Continuous>  dextrose 50% Injectable 25 Gram(s) IV Push once  dextrose 50% Injectable 12.5 Gram(s) IV Push once  dextrose 50% Injectable 25 Gram(s) IV Push once  diclofenac sodium 1% Gel 2 Gram(s) Topical every 6 hours  escitalopram 10 milliGRAM(s) Oral <User Schedule>  fentaNYL   Patch  12 MICROgram(s)/Hr 1 Patch Transdermal every 72 hours  fentaNYL   Patch  25 MICROgram(s)/Hr 1 Patch Transdermal every 72 hours  gabapentin Solution 250 milliGRAM(s) Oral <User Schedule>  glucagon  Injectable 1 milliGRAM(s) IntraMuscular once  glucagon  Injectable 1 milliGRAM(s) IntraMuscular once  hemorrhoidal Ointment 1 Application(s) Rectal at bedtime  influenza  Vaccine (HIGH DOSE) 0.5 milliLiter(s) IntraMuscular once  insulin glargine Injectable (LANTUS) 14 Unit(s) SubCutaneous <User Schedule>  insulin lispro (ADMELOG) corrective regimen sliding scale   SubCutaneous every 6 hours  insulin lispro Injectable (ADMELOG) 32 Unit(s) SubCutaneous <User Schedule>  insulin lispro Injectable (ADMELOG) 20 Unit(s) SubCutaneous <User Schedule>  lactobacillus acidophilus 1 Tablet(s) Oral <User Schedule>  levothyroxine 125 MICROGram(s) Oral <User Schedule>  lidocaine   4% Patch 4 Patch Transdermal every 24 hours  melatonin 12 milliGRAM(s) Oral <User Schedule>  metroNIDAZOLE  IVPB 500 milliGRAM(s) IV Intermittent every 8 hours  nystatin Powder 1 Application(s) Topical every 8 hours  pantoprazole  Injectable 40 milliGRAM(s) IV Push every 12 hours  predniSONE  Solution 5 milliGRAM(s) Enteral Tube <User Schedule>  QUEtiapine 12.5 milliGRAM(s) Oral <User Schedule>  sodium chloride 1 Gram(s) Oral every 8 hours  sodium chloride 0.65% Nasal 1 Spray(s) Both Nostrils every 6 hours  vancomycin    Solution 125 milliGRAM(s) Oral every 6 hours  witch hazel Pads 1 Application(s) Topical every 12 hours    MEDICATIONS  (PRN):  acetaminophen   Oral Liquid .. 650 milliGRAM(s) Oral every 6 hours PRN Temp greater or equal to 38C (100.4F), Mild Pain (1 - 3)  aluminum hydroxide/magnesium hydroxide/simethicone Suspension 30 milliLiter(s) Enteral Tube every 4 hours PRN Dyspepsia  oxyCODONE    Solution 2.5 milliGRAM(s) Oral every 6 hours PRN Mild Pain (1 - 3)  oxyCODONE    Solution 5 milliGRAM(s) Oral every 6 hours PRN Moderate Pain (4 - 6)  oxyCODONE    Solution 10 milliGRAM(s) Oral every 6 hours PRN Severe Pain (7 - 10)      LABS:                        8.5    12.33 )-----------( 179      ( 04 Nov 2024 06:29 )             28.2     11-04    129[L]  |  97  |  17  ----------------------------<  131[H]  4.5   |  20[L]  |  <0.30[L]    Ca    8.4      04 Nov 2024 06:29  Phos  3.5     11-04  Mg     2.0     11-04    TPro  6.2  /  Alb  2.4[L]  /  TBili  0.7  /  DBili  x   /  AST  26  /  ALT  19  /  AlkPhos  106  11-04      Urinalysis Basic - ( 04 Nov 2024 06:29 )    Color: x / Appearance: x / SG: x / pH: x  Gluc: 131 mg/dL / Ketone: x  / Bili: x / Urobili: x   Blood: x / Protein: x / Nitrite: x   Leuk Esterase: x / RBC: x / WBC x   Sq Epi: x / Non Sq Epi: x / Bacteria: x          CAPILLARY BLOOD GLUCOSE    MICROBIOLOGY:     RADIOLOGY:  [ ] Reviewed and interpreted by me    Mode: AC/ CMV (Assist Control/ Continuous Mandatory Ventilation)  RR (machine): 18  TV (machine): 350  FiO2: 30  PEEP: 5  ITime: 1  MAP: 11  PIP: 26   Patient is a 74y old  Female who presents with a chief complaint of Patient admitted with Hypoxemia and episode of Bright red blood per rectum (04 Nov 2024 18:18)    Interval Events:  Rectal tube placed overnight.     REVIEW OF SYSTEMS:  [ ] Positive  [X] All other systems negative  [ ] Unable to assess ROS because ________    Vital Signs Last 24 Hrs  T(C): 36.9 (11-05-24 @ 05:45), Max: 39 (11-04-24 @ 12:00)  T(F): 98.4 (11-05-24 @ 05:45), Max: 102.2 (11-04-24 @ 12:00)  HR: 100 (11-05-24 @ 06:05) (75 - 117)  BP: 165/73 (11-05-24 @ 05:45) (113/48 - 165/73)  RR: 18 (11-05-24 @ 05:45) (18 - 18)  SpO2: 100% (11-05-24 @ 06:05) (100% - 100%)    PHYSICAL EXAM:  HEENT:   [X]Tracheostomy: #8 XLT cuffed shiley   [X]Pupils equal  [ ]No oral lesions  [ ]Abnormal    SKIN  [ ]No Rash  [ ] Abnormal  [X] pressure - sacral unstageable    CARDIAC  [X]Regular  [ ]Abnormal    PULMONARY  [ ]Bilateral Clear Breath Sounds  [ ]Normal Excursion  [X]Abnormal - mild coarse BS     GI  [X]PEG      [X] +BS		              [X]Soft, nondistended, nontender	  [X]Abnormal - old PEG site c/d/i, + rectal tube     MUSCULOSKELETAL                                   [X]Bedbound                 [ ]Abnormal    [ ]Ambulatory/OOB to chair                           EXTREMITIES                                         [ ]Normal  [X]Edema - mild generalized pitting edema                          NEUROLOGIC  [ ] Normal, non focal  [X] Focal findings: lethargic but following commands    PSYCHIATRIC  [x] Easily arousable, lethargic  [ ] Sedated	 [ ]Agitated    :  Mcbride: [ ] Yes, if yes: Date of Placement:                   [X] No    LINES: Central Lines [ ] Yes, if yes: Date of Placement                                     [X] No    HOSPITAL MEDICATIONS:  MEDICATIONS  (STANDING):  albuterol/ipratropium for Nebulization 3 milliLiter(s) Nebulizer every 6 hours  artificial  tears Solution 1 Drop(s) Both EYES every 6 hours  ascorbic acid 500 milliGRAM(s) Oral daily  Biotene Dry Mouth Oral Rinse 5 milliLiter(s) Swish and Spit every 6 hours  calcium carbonate 1250 mG  + Vitamin D (OsCal 500 + D) 1 Tablet(s) Oral <User Schedule>  chlorhexidine 0.12% Liquid 15 milliLiter(s) Oral Mucosa every 12 hours  chlorhexidine 4% Liquid 1 Application(s) Topical daily  dextrose 5%. 1000 milliLiter(s) (50 mL/Hr) IV Continuous <Continuous>  dextrose 5%. 1000 milliLiter(s) (100 mL/Hr) IV Continuous <Continuous>  dextrose 5%. 1000 milliLiter(s) (100 mL/Hr) IV Continuous <Continuous>  dextrose 5%. 1000 milliLiter(s) (50 mL/Hr) IV Continuous <Continuous>  dextrose 50% Injectable 25 Gram(s) IV Push once  dextrose 50% Injectable 12.5 Gram(s) IV Push once  dextrose 50% Injectable 25 Gram(s) IV Push once  diclofenac sodium 1% Gel 2 Gram(s) Topical every 6 hours  escitalopram 10 milliGRAM(s) Oral <User Schedule>  fentaNYL   Patch  12 MICROgram(s)/Hr 1 Patch Transdermal every 72 hours  fentaNYL   Patch  25 MICROgram(s)/Hr 1 Patch Transdermal every 72 hours  gabapentin Solution 250 milliGRAM(s) Oral <User Schedule>  glucagon  Injectable 1 milliGRAM(s) IntraMuscular once  glucagon  Injectable 1 milliGRAM(s) IntraMuscular once  hemorrhoidal Ointment 1 Application(s) Rectal at bedtime  influenza  Vaccine (HIGH DOSE) 0.5 milliLiter(s) IntraMuscular once  insulin glargine Injectable (LANTUS) 14 Unit(s) SubCutaneous <User Schedule>  insulin lispro (ADMELOG) corrective regimen sliding scale   SubCutaneous every 6 hours  insulin lispro Injectable (ADMELOG) 32 Unit(s) SubCutaneous <User Schedule>  insulin lispro Injectable (ADMELOG) 20 Unit(s) SubCutaneous <User Schedule>  lactobacillus acidophilus 1 Tablet(s) Oral <User Schedule>  levothyroxine 125 MICROGram(s) Oral <User Schedule>  lidocaine   4% Patch 4 Patch Transdermal every 24 hours  melatonin 12 milliGRAM(s) Oral <User Schedule>  metroNIDAZOLE  IVPB 500 milliGRAM(s) IV Intermittent every 8 hours  nystatin Powder 1 Application(s) Topical every 8 hours  pantoprazole  Injectable 40 milliGRAM(s) IV Push every 12 hours  predniSONE  Solution 5 milliGRAM(s) Enteral Tube <User Schedule>  QUEtiapine 12.5 milliGRAM(s) Oral <User Schedule>  sodium chloride 1 Gram(s) Oral every 8 hours  sodium chloride 0.65% Nasal 1 Spray(s) Both Nostrils every 6 hours  vancomycin    Solution 125 milliGRAM(s) Oral every 6 hours  witch hazel Pads 1 Application(s) Topical every 12 hours    MEDICATIONS  (PRN):  acetaminophen   Oral Liquid .. 650 milliGRAM(s) Oral every 6 hours PRN Temp greater or equal to 38C (100.4F), Mild Pain (1 - 3)  aluminum hydroxide/magnesium hydroxide/simethicone Suspension 30 milliLiter(s) Enteral Tube every 4 hours PRN Dyspepsia  oxyCODONE    Solution 2.5 milliGRAM(s) Oral every 6 hours PRN Mild Pain (1 - 3)  oxyCODONE    Solution 5 milliGRAM(s) Oral every 6 hours PRN Moderate Pain (4 - 6)  oxyCODONE    Solution 10 milliGRAM(s) Oral every 6 hours PRN Severe Pain (7 - 10)    LABS:                        8.5    12.33 )-----------( 179      ( 04 Nov 2024 06:29 )             28.2     11-04    129[L]  |  97  |  17  ----------------------------<  131[H]  4.5   |  20[L]  |  <0.30[L]    Ca    8.4      04 Nov 2024 06:29  Phos  3.5     11-04  Mg     2.0     11-04    TPro  6.2  /  Alb  2.4[L]  /  TBili  0.7  /  DBili  x   /  AST  26  /  ALT  19  /  AlkPhos  106  11-04    Urinalysis Basic - ( 04 Nov 2024 06:29 )    Color: x / Appearance: x / SG: x / pH: x  Gluc: 131 mg/dL / Ketone: x  / Bili: x / Urobili: x   Blood: x / Protein: x / Nitrite: x   Leuk Esterase: x / RBC: x / WBC x   Sq Epi: x / Non Sq Epi: x / Bacteria: x    CAPILLARY BLOOD GLUCOSE    MICROBIOLOGY:     RADIOLOGY:  [ ] Reviewed and interpreted by me    Mode: AC/ CMV (Assist Control/ Continuous Mandatory Ventilation)  RR (machine): 18  TV (machine): 350  FiO2: 30  PEEP: 5  ITime: 1  MAP: 11  PIP: 26

## 2024-11-05 NOTE — PROGRESS NOTE ADULT - NUTRITIONAL ASSESSMENT
Diet, NPO with Tube Feed:   Tube Feeding Modality: Jejunostomy  Casie FotoIN Mobile Peptide 1.5 (KFPEPT1.5RTH)  Total Volume for 24 Hours (mL): 960  Continuous  Until Goal Tube Feed Rate (mL per Hour): 80  Tube Feed Duration (in Hours): 12  Tube Feed Start Time: 06:00  Free Water Flush  Pump   Rate (mL per Hour): 50   Frequency: Every Hour  Free Water Flush Instructions:  50ml free water flushes Q1hr  Alfred(7 Gm Arginine/7 Gm Glut/1.2 Gm HMB     Qty per Day:  2  No Carb Prosource (1pkg = 15gms Protein)     Qty per Day:  1  Banatrol TF     Qty per Day:  1/2 packet per daily (11-01-24 @ 08:28) [Active]

## 2024-11-05 NOTE — PROGRESS NOTE ADULT - SUBJECTIVE AND OBJECTIVE BOX
Follow Up:  lethargy    Interval History/ROS: lethargy    Allergies  walnut (Unknown)  metronidazole (Rash)  Lyrica (Unknown)  penicillin (Unknown)  Pineapple (Unknown)  Tagamet (Unknown)  heparin (Unknown)  Pecans (Unknown)  Hazelnut (Unknown)  meropenem (Rash)    ANTIMICROBIALS:  metroNIDAZOLE  IVPB 500 every 8 hours  vancomycin    Solution 125 every 6 hours      OTHER MEDS:  MEDICATIONS  (STANDING):  acetaminophen   Oral Liquid .. 650 every 6 hours PRN  albuterol/ipratropium for Nebulization 3 every 6 hours  aluminum hydroxide/magnesium hydroxide/simethicone Suspension 30 every 4 hours PRN  dextrose 50% Injectable 25 once  dextrose 50% Injectable 25 once  dextrose 50% Injectable 12.5 once  escitalopram 10 <User Schedule>  fentaNYL   Patch  12 MICROgram(s)/Hr 1 every 72 hours  fentaNYL   Patch  25 MICROgram(s)/Hr 1 every 72 hours  gabapentin Solution 250 <User Schedule>  glucagon  Injectable 1 once  glucagon  Injectable 1 once  influenza  Vaccine (HIGH DOSE) 0.5 once  insulin glargine Injectable (LANTUS) 14 <User Schedule>  insulin lispro (ADMELOG) corrective regimen sliding scale  every 6 hours  insulin lispro Injectable (ADMELOG) 32 <User Schedule>  insulin lispro Injectable (ADMELOG) 20 <User Schedule>  levothyroxine 125 <User Schedule>  melatonin 12 <User Schedule>  oxyCODONE    Solution 10 every 6 hours PRN  oxyCODONE    Solution 2.5 every 6 hours PRN  oxyCODONE    Solution 5 every 6 hours PRN  pantoprazole  Injectable 40 every 12 hours  predniSONE  Solution 5 <User Schedule>  QUEtiapine 12.5 <User Schedule>      Vital Signs Last 24 Hrs  T(C): 36.8 (05 Nov 2024 13:05), Max: 38.4 (05 Nov 2024 11:55)  T(F): 98.3 (05 Nov 2024 13:05), Max: 101.2 (05 Nov 2024 11:55)  HR: 95 (05 Nov 2024 14:35) (75 - 116)  BP: 108/52 (05 Nov 2024 12:00) (108/52 - 165/73)  BP(mean): --  RR: 18 (05 Nov 2024 12:00) (18 - 18)  SpO2: 100% (05 Nov 2024 14:35) (100% - 100%)    Parameters below as of 05 Nov 2024 12:04  Patient On (Oxygen Delivery Method): ventilator        PHYSICAL EXAM:  General: ill appearing  Neurology: near somnolent  Respiratory: decreased wet rhonchi on exam  CV: RRR, S1S2, no murmurs, rubs or gallops  Abdominal: Soft, Non-tender, distended,   Extremities: generalized 1+ edema  Line Sites: Clear  Skin: No rash                          9.0    13.29 )-----------( 202      ( 05 Nov 2024 07:05 )             31.1   WBC Count: 13.29 (11-05 @ 07:05)  WBC Count: 12.33 (11-04 @ 06:29)  WBC Count: 15.12 (11-03 @ 07:21)  WBC Count: 15.69 (11-02 @ 10:09)  WBC Count: 14.86 (11-01 @ 06:15)    11-05    130[L]  |  98  |  15  ----------------------------<  150[H]  4.7   |  18[L]  |  <0.30[L]    Ca    8.0[L]      05 Nov 2024 07:05  Phos  3.8     11-05  Mg     1.6     11-05    TPro  6.6  /  Alb  2.6[L]  /  TBili  0.6  /  DBili  x   /  AST  31  /  ALT  20  /  AlkPhos  115  11-05 11.05.24 @ 13:56)   Blood Gas Arterial, Lactate: 3.4:   rterial (11.05.24 @ 13:56)   pH, Arterial: 7.35  pCO2, Arterial: 33 mmHg  pO2, Arterial: 129 mmHg  HCO3, Arterial: 18 mmol/L  Base Excess, Arterial: -6.7 mmol/L  Oxygen Saturation, Arterial: 100.0 %  Total CO2, Arterial: 19 mmol/L  FIO2, Arterial: UNK  Blood Gas Source Arterial: Arterial    Fi)2 = 0.3  A-->a gradient = 44        MICROBIOLOGY:  .Sputum Sputum  11-04-24 --  --    Rare polymorphonuclear leukocytes per low power field  No Squamous epithelial cells per low power field  Moderate Gram Negative Rods seen per oil power field  Few Gram Positive Rods seen per oil power field      Clean Catch Clean Catch (Midstream)  11-03-24   No growth  --  --      .Blood BLOOD  11-03-24   No growth at 48 Hours  --  --      .Blood BLOOD  11-03-24   No growth at 48 Hours  --  --      .Blood BLOOD  11-01-24   Growth in anaerobic bottle: Staphylococcus epidermidis  Isolation of Coagulase negative Staphylococcus from single blood culture  sets may represent  contamination. Contact the Microbiology Department at [phone] if  susceptibility testing is needed.  clinically indicated.  Direct identification is available within approximately 3-5  hours either by Blood Panel Multiplexed PCR or Direct  MALDI-TOF. Details: [email or web address]  --  Blood Culture PCR      Catheterized Catheterized  11-01-24   No growth  --  --      .Blood BLOOD  11-01-24   No growth at 4 days  --  --      Trach Asp Tracheal Aspirate  10-27-24   Moderate Pseudomonas aeruginosa  Moderate Pseudomonas aeruginosa #2  Multiple Morphological Strains  Commensal vinay consistent with body site  Culture - Sputum . (10.27.24 @ 19:50)   - Resistance Gene to Carbapenem: Nondet  Gram Stain:   Rare polymorphonuclear leukocytes per low power field   Rare Squamous epithelial cells per low power field   Moderate Gram Negative Rods per oil power field   Moderate Gram Positive Rods per oil power field  - Aztreonam: S 8  - Aztreonam: S <=4  - Cefepime: S 4  - Cefepime: S 4  - Ceftazidime: S 4  - Ceftazidime: S <=1  - Ciprofloxacin: R >2  - Ciprofloxacin: R >2  - Imipenem: R >8  - Imipenem: S 2  - Levofloxacin: R >4  - Levofloxacin: R >4  - Meropenem: R 8  - Meropenem: S <=1  - Piperacillin/Tazobactam: S <=8  - Piperacillin/Tazobactam: S <=8      .Blood BLOOD  10-27-24   No growth at 5 days  --  --      .Blood BLOOD  10-17-24   No growth at 5 days  --  --      .Blood BLOOD  10-17-24   No growth at 5 days  --  --      .Blood BLOOD  10-15-24   No growth at 5 days  --  --      Trach Asp Tracheal Aspirate  10-15-24   Mixed gram negative rods including  Numerous Pseudomonas aeruginosa  Commensal vinay consistent with body site  --  Pseudomonas aeruginosa      .Blood BLOOD  10-15-24   No growth at 5 days  --  --      Trach Asp Tracheal Aspirate  10-08-24   Few Pseudomonas aeruginosa  Commensal vinay consistent with body site  --  Pseudomonas aeruginosa      Clean Catch Clean Catch (Midstream)  10-07-24   10,000 - 49,000 CFU/mL Escherichia coli ESBL  --  Escherichia coli ESBL      .Blood BLOOD  10-07-24   No growth at 5 days  --  --      .Blood BLOOD  10-07-24   No growth at 5 days  --  --      Clostridium difficile GDH Toxins A&amp;B, EIA:   Positive (11-01-24 @ 13:44)  Clostridium difficile GDH Interpretation: Positive for toxigenic C. Difficile.  This specimen is positive for C.  Difficile glutamate dehydrogenase (GDH) antigen and positive for C.  Difficile Toxins A & B, by EIA.  . (11-01-24 @ 13:44)      RADIOLOGY:  < from: Xray Chest 1 View- PORTABLE-Routine (Xray Chest 1 View- PORTABLE-Routine .) (11.04.24 @ 14:58) >  COMPARISON STUDY: 11/1/2024    Frontal expiratory view of the chest shows the heart to be similar in   size. Tracheostomy tube is again noted.    The lungs show lower left lung infiltrate with small left effusion and   there is no evidence of pneumothorax nor right pleural effusion.    IMPRESSION:  Left infiltrate with small effusion.    < end of copied text >      Zion Newman MD; Division of Infectious Disease; Pager: [phone]; nights and weekends: [phone]

## 2024-11-05 NOTE — PROGRESS NOTE ADULT - ASSESSMENT
73 yo woman PMH T2DM on insulin, HTN, HLD, hypothyroidism, RA on Prednisone, Fibromyalgia, cerebral aneurysm s/p repair, chronic hypercapnic respiratory failure s/p trach (vent dependent) and chronic PEG 2022, recurrent C. diff infection , persistent GJ tube leaks now s/p GC fistula repair 8/12  admitted 10/7/24 with resp distress and found to have RML opacity     Antibiotics  Ceftriaxone 10/7  Azithro 10/7  Cefepime 10/7--> 10/12  meropenem 10/15 --> 10/23  IV Vanco 10/17 --> 10/21  Vanco via NGT 10/24; 11/1-->  Metronidazole 11/3 -->      10/10 melena, anemia, blood tx  10/14 EGD for GJ exchange and piror PEG site closure  One benign-appearing, intrinsic moderate stenosis was found in the upper third of the        esophagus. The stenosis was traversed.       The cardia and gastric fundus were normal.       A gastric tube with tip in the jejunum was found in the gastric body. The PEG required        removal because it was leaking. The J tube extension (12F) was removed first. An externally        removable 24 Fr EndoVive Safety gastrostomy tube was lubricated. The guide wire was passed        through the existing G-tube port and snared endoscopically. The endoscope and snare were then        removed, pulling the wire out through the mouth. The g-tube was tied to the guidewire, pulled        through the mouth into the stomach and then pulled out from the stomach through the skin. The        bumper was attached to the gastrostomy tube. The feeding tube was then cut to an appropriate        length. The final position of the gastrostomy tube was confirmed by relook endoscopy, and        skin marking noted to be 3 cm at the external bumper. The final tension and compression of        the abdominal wall by the PEG tube and external bumper were checked and revealed that the        bumper was moderately tight and mildly deforming the skin. The J tube extension (12 F        EndoVive Jejunal feeding tube) was advanced into the stomach. The tip of the J tube had a        loop thread that was captured with a Resolution clip. The thread and the J tube extension        were advanced to the proximal jejunum, and the endoclip along with the tip of the J tube was        attached to the wall securely. The J tube extension was capped, and the tube site was cleaned        and dressed.       A small fistula was found on the anterior wall of the gastric body related to prior G tube        site. Coagulation of tissue near the fistula using argon plasma at 1.2 liters/minute and 35        peraza was successful. To closure the gastrocutaneous fistula, four hemostatic clips were        successfully placed (MR conditional, two 16 mm DuraClip and 2 Mantis clips.       The examined duodenum was normal.    10/14, 10/15 Fever, transient hypoxia post procedure  10/15 ProCalcitonin = 8.48 suggestive of bacterial process; BCx x2 NGTD; Trach: Pseudomonas   - though benign mg stain  10;16 Leukocytosis WBC = 14.26  10/17 fever, hypotension, abd distension, no diarrhea noted this am WBC = 15.02; Rising Procalcitonin = 38.49; Obstructed J tube   10/18  lost IV access re-gained  - CT scan late this afternoon  WBC = 8.68; Rising Procalcitonin >100  10/18 imaging suggests multifocal pneumonia  10/21 improved chest exam, Procalcitonin level considerably decreased, decreased FiO2  - afebrile since 10/18  10/21 UGI endoscopy done  Findings:       The esophagus was normal. A fistula was found on the anterior wall of the gastric body.       There was evidence of a gastrostomy present in the gastric body. The patient was placed in        the supine position. The endoscope was advanced to the jejunum and the prior placed J tube        with loop attached to the wall and the loop thread was cut by endoclip. The J tube and the G        tube were removed. The gastrostomy fistula was utilized and an Avanos 22 F        Gastrostomy/Jejunal tube was advanced. The jejunal tip was advanced through the pylorus and        into the duodenum. The endoscope was then advanced to the duodenum and the loop thread at the        tip of the J tube was captured and advanced to the proximal jejunum. The loop thread was        clipped to wall by a [employer] Resolution clip. The J tube flushed easily and the G        tube decompress the abdomen easily. The internal balloon was insufflated with 10 cc of water        to hold the GJ tube in place. The outside marking was at 3.  10/23  no distress, liquid stools noted  - Meropenem discontinued  GI PCR NEG  10/24  persistent diarrhea  Cdiff NEG; enteral vanco stopped and Immodium provided  10/28 low grade temps, mild leukocytosis  10/30 blood transfusion  11/1  fever  +Cdiff  11/3 continued fever  - +BC Stph epi most likely procurement contaminant,  modest diarrhea reported  - daughter described increased diarrhea in evenings - Pseudomonas airway colonization is long term, no suggestion of pneumonia at present, sacral decub remains superifical will granulated without signs of infection  11/4  - fever, abnormal chest exam though FIO2 still 30%  rectal tube inserted for increased diarrhea  11/5 lethargy, fever, leukocytosis, increased lactate, hyponatremia      Issues  recurrent Cdiff  cerebral aneurysm s/p repair  chronic hypercapnic respiratory failure s/p trach (vent dependent) and chronic PEG 2022  anemia  - had iron deficiency  Pseudomonas aeruginosa upper airway colonization  (most recent isolate Resist to Cipro/Levo)  T2DM on insulin  HTN  HLD  hypothyroidism  RA on Prednisone  Fibromyalgia  debility  sacral ulcer largely healed  malnourished/chronic catabolic state      Suggest  Continue vanco 125 q 6 hours via J tube 11/1 -->  And  Flagyl 11/3--> for Cdiff    Discussed with RCU team  - pneumonia unlikely  Consider Cefepime if more evidence favors pneumonia

## 2024-11-05 NOTE — PROGRESS NOTE ADULT - NS ATTEND AMEND GEN_ALL_CORE FT
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 Found to have Pseudomonas aeruginosa in sputum culture and urine culture with ESBL E. coli s/p course of meropenem. Course complicated by antibiotic-associated diarrhea.          - C/W current pain regiment, well controlled. C/W escitalopram and seroquel, will add tylenol for fever control  - continue lung protective ventilation. Pressure support trials as tolerates. Does poorly on PS trials. Continue Duonebs.   - cont tx for cdiff, f/u ID reccs (prior cx's w/ PsA sputum and esbl ecoli uti)  - monitor BM frequency  - Anemia multifactoral, hemorrhoids, AOCD. s/p EGD without active bleeding, f/u Heme reccs  - cont to monitor FS    -  RA, on home prednisone 5 mg daily. Hold off on Enbrel for now  - monitor hypoNa, tsh normal   - DVT ppx- only scd for now given significant anemia a few days ago  -Dispo: full code, prognosis guarded, discussed with patient and daughter Marysol 74F PMH DM2 (insulin-dependent), HTN, HLD, hypothyroidism, RA on Prednisone, fibromyalgia, cerebral aneurysm s/p repair, chronic hypoxemia and hypercapnic respiratory failure s/p trach, vent-dependent, recurrent C diff infection, oropharyngeal dysphagia s/p G-J tube with persistent GJ tube leaks now s/p GC fistula repair 8/12, and recent presentation 5 days PTA for rectal bleeding requiring transfusion in the ED who now presents with acute hypoxemic respiratory distress 2/2 RML PNA, admitted to the RCU for further management. Found to have Pseudomonas aeruginosa in sputum culture and urine culture with ESBL E. coli s/p course of meropenem. Course complicated by antibiotic-associated diarrhea.          - C/W current pain regiment, well controlled. C/W escitalopram and seroquel,  add tylenol for fever control  - pt remains lethargic poss septic encephalopathy, will check CTH  - continue lung protective ventilation. Pressure support trials as tolerates. Does poorly on PS trials. Continue Duonebs.   - cont tx for cdiff, f/u ID reccs (prior cx's w/ PsA sputum and esbl ecoli uti)  - pt LA increased and may have pna as already tx for her cdiff infection  - start cefepime for now, f/u ID reccs  - monitor BM frequency  - Anemia multifactorial, hemorrhoids, AOCD. s/p EGD without active bleeding, f/u Heme reccs  - cont to monitor FS    -  RA, on home prednisone 5 mg daily. Hold off on Enbrel for now  - monitor hypoNa, tsh normal   - DVT ppx- only scd for now given significant anemia a few days ago  -Dispo: full code, prognosis guarded, discussed with patient and daughter Marysol

## 2024-11-05 NOTE — PROGRESS NOTE ADULT - PROBLEM SELECTOR PLAN 2
[] PNA  - CXR 10/7: Opacification of the right middle lobe may represent pneumonia versus atelectasis  - sputum culture 10/8 PSA  - completed course of cefepime on 10/12  - CT A/P 10/18: ?multifocal pneumonia, presence of gastrocutaneous fistula; otherwise no collections/abscess noted  - Txd with meropenem 10/15-10/23; d/c'd for persistent diarrhea    [] C.Diff / Fevers  - cdiff + 11/1   - Febrile 102.2 Today   - Bld cx 11/1: MR Tristan Epi, Repeat Bld cx 11/3: NGTD  - Urine cx 11/3: NGTD, Sputum cx 11/4: ( Sent/ Results pending )  - F/u Repeat CXR   - Cont po Vanco / Flagyl  - ID Continues to follow

## 2024-11-05 NOTE — PROGRESS NOTE ADULT - PROBLEM SELECTOR PLAN 1
- Please monitor blood glucose values q 6 hours while on tube feeding or NPO  - Continue Lantus to 14 units at 6 am.   - Increase Admelog to 24 u at 0600 (if FBG <100s administer 9 units instead, Hold if TFs are on hold)  - Increase Admelog to 36units at 1200 ( if BG < 100 give 15 units instead, hold if TFs are on hold)   - Continue Moderate dose Admelog correctional scale q6h while on TFs and overnight, change to low dose if holding Tube feeding   - Please keep hypoglycemia protocol in place  Discharge Plan:  - TBD depending on insulin requirement and discharge tube feeding regimen (Bolus vs continuous tube feeding)   - If bolus tube feeding > Lantus plus Humalog   - Patient should have BGs checked 4x a day while on TFs.    Daughter aware to contact Endocrinologist if BG <70mg/dL x1 or >200mg/dL consistently or >400mg/dL x1.   - Followup with Dr Ruth: [facility]: [address] [place] [address]. [address]. [place] [address]. Tel: [phone]- Telemedicine- daughter to schedule.   - Make sure pt has Rx for all DM supplies and insulin IV discontinued, cath removed intact

## 2024-11-05 NOTE — PROGRESS NOTE ADULT - ASSESSMENT
75 yo F PMH T2DM on insulin, HTN, HLD, hypothyroidism, RA on Prednisone, Fibromyalgia, cerebral aneurysm s/p repair, chronic hypercapnic respiratory failure s/p trach (vent dependent) and chronic PEG 2022, recurrent C. diff infection (follows with Dr. Newman), persistent GJ tube leaks now s/p GC fistula repair 8/12 with hypoxia, PNA, with anemia    Anemia, AOCD + phlebotomy  Patient has  multiple chronic issues causing AOCD with acute worsening in context of acute illness + phlebotomy  Was fecal occult + on admission but now no overt bleeding  No CKD or hemolysis. No B12 def  Had  PRBC 10/10, 10/30  WBC/plt stable; plt with some fluctuation, currently normal  Continue to monitor Hg  Management remains PRBC as needed and treatment of underlying disease   75 yo F PMH T2DM on insulin, HTN, HLD, hypothyroidism, RA on Prednisone, Fibromyalgia, cerebral aneurysm s/p repair, chronic hypercapnic respiratory failure s/p trach (vent dependent) and chronic PEG 2022, recurrent C. diff infection (follows with Dr. Newman), persistent GJ tube leaks now s/p GC fistula repair 8/12 with hypoxia, PNA, with anemia    Anemia, AOCD + phlebotomy  Patient has  multiple chronic issues causing AOCD with acute worsening in context of acute illness + phlebotomy  Was fecal occult + on admission but now no overt bleeding  No CKD or hemolysis. No B12 def  Had  PRBC 10/10, 10/30  WBC/plt stable; plt with some fluctuation, currently normal  Continue to monitor Hg  Management remains PRBC as needed and treatment of underlying disease  Patient's daughter stated that her mother does better with hb around 8 gm/dl. According t her her mothers condition declines with hb below 8gm/dl. She would like that to be kept as baseline  at present hb is 9 gm/dl

## 2024-11-06 NOTE — PROGRESS NOTE ADULT - ASSESSMENT
75 yo F PMH T2DM on insulin, HTN, HLD, hypothyroidism, RA on Prednisone, Fibromyalgia, cerebral aneurysm s/p repair, chronic hypercapnic respiratory failure s/p trach (vent dependent) and chronic PEG 2022, recurrent C. diff infection (follows with Dr. 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  Pt also noted to have rectal bleeding this past week requiring transfusion 5 days ago in ED as per daughter. Daughter also reports brownish discharge from G-J tube. In ED, pt afebrile, fiO2 96-98% on 40% on ventilator.  Chest X-ray w/ opacification of right lung concerning for possible PNA.  Admitted to RCU for further management. Sputum cxs grew PSA; treated with course of Cefepime. Patient with decreased H+H received 1 unit of PRBCS on 10/10.  10/14 EGD w/ Dr. Rosales for PEGJ exchange and clippings placed for closure of fistula site.  Persistent fevers treated with meropenem and vanc (d/c'd 10/20).  CT A/P without infectious source.  Continued fevers and persistent leukocytosis 11/1 CDiff positive - po vanco started, IV flagyl added 11/3.  Continue airway management with duonebs, chest PT and suction PRN.            11/5:  Low grade fever today - Tylenol given.  Blood cultures/Urine culture 11/3 NGTD.  Sputum culture still pending.  Rectal tube placed overnight - liquid diarrhea in bag.  Pt more lethargic as per family members and bedside aides - concerned that the flagyl may be causing her lethargy.  Discussed with Dr. Newman likely not the flagyl contributing to the lethargy.  According to family, there was also a spike in BP w/ SBPs to 200s (undocumented episode?).  Ordered ABG - reviewed, results do not appear to contribute to AMS.  Ordered CT H, C/A/P (elevated lactate on gas).  Reached out to Dr. Newman again in regards to empirically treating elevated lactate, did not start antibiotics. 73 yo F PMH T2DM on insulin, HTN, HLD, hypothyroidism, RA on Prednisone, Fibromyalgia, cerebral aneurysm s/p repair, chronic hypercapnic respiratory failure s/p trach (vent dependent) and chronic PEG 2022, recurrent C. diff infection (follows with Dr. 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  Pt also noted to have rectal bleeding this past week requiring transfusion 5 days ago in ED as per daughter. Daughter also reports brownish discharge from G-J tube. In ED, pt afebrile, fiO2 96-98% on 40% on ventilator.  Chest X-ray w/ opacification of right lung concerning for possible PNA.  Admitted to RCU for further management. Sputum cxs grew PSA; treated with course of Cefepime. Patient with decreased H+H received 1 unit of PRBCS on 10/10.  10/14 EGD w/ Dr. Rosales for PEGJ exchange and clippings placed for closure of fistula site.  Persistent fevers treated with meropenem and vanc (d/c'd 10/20).  CT A/P without infectious source.  Continued fevers and persistent leukocytosis 11/1 CDiff positive - po vanco started, IV flagyl added 11/3.  Continue airway management with duonebs, chest PT and suction PRN.            11/6:  Febrile 101.8, Placed on Cefepime for CR pseudomonas in sputum.  - Tylenol given.  Blood cultures/Urine culture 11/3 NGTD.  Rectal tube placed overnight - liquid diarrhea in bag.  Pt more lethargic as per family members and bedside aides.  Ordered CT H, C/A/P. Source not confirmed, most likely metabolic encephalopathy.

## 2024-11-06 NOTE — PROGRESS NOTE ADULT - NS ATTEND AMEND GEN_ALL_CORE FT
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 Found to have Pseudomonas aeruginosa in sputum culture and urine culture with ESBL E. coli s/p course of meropenem. Course complicated by antibiotic-associated diarrhea.          - C/W current pain regiment, well controlled. C/W escitalopram and seroquel,  add tylenol for fever control  - pt remains lethargic poss septic encephalopathy but episodically back to baseline, likely septic encephalopathy  - CTH w/o acute changes  - continue lung protective ventilation. Pressure support trials as tolerates. Does poorly on PS trials. Continue Duonebs.   - cont tx for cdiff, f/u ID reccs (prior cx's w/ PsA sputum and esbl ecoli uti)  - c/f PNA on CT, cefepime for now, f/u ID reccs  - monitor BM frequency, volume improved past 24h  - Anemia multifactorial, hemorrhoids, AOCD. s/p EGD without active bleeding, f/u Heme reccs  - cont to monitor FS    -  RA, on home prednisone 5 mg daily   - monitor hypoNa, tsh normal   - DVT ppx- only scd for now given significant anemia a few days ago  -Dispo: full code, prognosis guarded, discussed with patient and daughter Marysol

## 2024-11-06 NOTE — PROGRESS NOTE ADULT - SUBJECTIVE AND OBJECTIVE BOX
Patient is a 74y old  Female who presents with a chief complaint of Patient admitted with Hypoxemia and episode of Bright red blood per rectum (05 Nov 2024 17:34)    HPI:  75 yo F PMH T2DM on insulin, HTN, HLD, hypothyroidism, RA on Prednisone, Fibromyalgia, cerebral aneurysm s/p repair, chronic hypercapnic respiratory failure s/p trach (vent dependent) and chronic PEG 2022, recurrent C. diff infection (follows with Dr. Newman), persistent GJ tube leaks now s/p GC fistula repair 8/12 BIBEMS with daughter for respiratory distress, hypoxia to high 80s.  Pt also noted to have rectal bleeding this past week requiring transfusion 5 days ago in ED as per daughter.  In ED, pt afebrile, fiO2 96-98% on 40% on ventilator.  Chest Xray w/ opacification of right lung concerning for possible PNA.  Admitted to RCU for further management.      (07 Oct 2024 18:58)    Interval Events:    REVIEW OF SYSTEMS:  [ ] Positive  [ ] All other systems negative  [ ] Unable to assess ROS because ________    Vital Signs Last 24 Hrs  T(C): 36.8 (11-06-24 @ 05:48), Max: 38.4 (11-05-24 @ 11:55)  T(F): 98.3 (11-06-24 @ 05:48), Max: 101.2 (11-05-24 @ 11:55)  HR: 84 (11-06-24 @ 06:22) (80 - 116)  BP: 96/50 (11-06-24 @ 05:48) (96/50 - 135/61)  RR: 18 (11-06-24 @ 05:48) (18 - 19)  SpO2: 100% (11-06-24 @ 06:22) (99% - 100%)    PHYSICAL EXAM:  HEENT:   [ ]Tracheostomy:  [ ]Pupils equal  [ ]No oral lesions  [ ]Abnormal    SKIN  [ ] No Rash  [ ] Abnormal  [ ] pressure    CARDIAC  [ ]Regular  [ ]Abnormal    PULMONARY  [ ]Bilateral Clear Breath Sounds  [ ]Normal Excursion  [ ]Abnormal    GI  [ ]PEG      [ ] +BS		              [ ]Soft, nondistended, nontender	  [ ]Abnormal    MUSCULOSKELETAL                                   [ ]Bedbound                 [ ]Abnormal    [ ]Ambulatory/OOB to chair                           EXTREMITIES                                         [ ]Normal  [ ]Edema                           NEUROLOGIC  [ ] Normal, non focal  [ ] Focal findings:    PSYCHIATRIC  [ ]Alert and appropriate  [ ] Sedated	 [ ]Agitated    :  Mcbride: [ ] Yes, if yes: Date of Placement:                   [  ] No    LINES: Central Lines [ ] Yes, if yes: Date of Placement                                     [  ] No    HOSPITAL MEDICATIONS:  MEDICATIONS  (STANDING):  albuterol/ipratropium for Nebulization 3 milliLiter(s) Nebulizer every 6 hours  AQUAPHOR (petrolatum Ointment) 1 Application(s) Topical once  artificial  tears Solution 1 Drop(s) Both EYES every 6 hours  ascorbic acid 500 milliGRAM(s) Oral daily  Biotene Dry Mouth Oral Rinse 5 milliLiter(s) Swish and Spit every 6 hours  calcium carbonate 1250 mG  + Vitamin D (OsCal 500 + D) 1 Tablet(s) Oral <User Schedule>  cefepime   IVPB 2000 milliGRAM(s) IV Intermittent every 8 hours  cefepime   IVPB      chlorhexidine 0.12% Liquid 15 milliLiter(s) Oral Mucosa every 12 hours  chlorhexidine 4% Liquid 1 Application(s) Topical daily  dextrose 5%. 1000 milliLiter(s) (50 mL/Hr) IV Continuous <Continuous>  dextrose 5%. 1000 milliLiter(s) (100 mL/Hr) IV Continuous <Continuous>  dextrose 5%. 1000 milliLiter(s) (100 mL/Hr) IV Continuous <Continuous>  dextrose 5%. 1000 milliLiter(s) (50 mL/Hr) IV Continuous <Continuous>  dextrose 50% Injectable 25 Gram(s) IV Push once  dextrose 50% Injectable 12.5 Gram(s) IV Push once  dextrose 50% Injectable 25 Gram(s) IV Push once  diclofenac sodium 1% Gel 2 Gram(s) Topical every 6 hours  escitalopram 10 milliGRAM(s) Oral <User Schedule>  fentaNYL   Patch  12 MICROgram(s)/Hr 1 Patch Transdermal every 72 hours  fentaNYL   Patch  25 MICROgram(s)/Hr 1 Patch Transdermal every 72 hours  gabapentin Solution 250 milliGRAM(s) Oral <User Schedule>  glucagon  Injectable 1 milliGRAM(s) IntraMuscular once  glucagon  Injectable 1 milliGRAM(s) IntraMuscular once  hemorrhoidal Ointment 1 Application(s) Rectal at bedtime  influenza  Vaccine (HIGH DOSE) 0.5 milliLiter(s) IntraMuscular once  insulin glargine Injectable (LANTUS) 14 Unit(s) SubCutaneous <User Schedule>  insulin lispro (ADMELOG) corrective regimen sliding scale   SubCutaneous every 6 hours  insulin lispro Injectable (ADMELOG) 24 Unit(s) SubCutaneous <User Schedule>  insulin lispro Injectable (ADMELOG) 36 Unit(s) SubCutaneous <User Schedule>  lactobacillus acidophilus 1 Tablet(s) Oral <User Schedule>  levothyroxine 125 MICROGram(s) Oral <User Schedule>  lidocaine   4% Patch 4 Patch Transdermal every 24 hours  melatonin 12 milliGRAM(s) Oral <User Schedule>  metroNIDAZOLE  IVPB 500 milliGRAM(s) IV Intermittent every 8 hours  nystatin Powder 1 Application(s) Topical every 8 hours  pantoprazole  Injectable 40 milliGRAM(s) IV Push every 12 hours  predniSONE  Solution 5 milliGRAM(s) Enteral Tube <User Schedule>  QUEtiapine 12.5 milliGRAM(s) Oral <User Schedule>  sodium chloride 1 Gram(s) Oral every 8 hours  sodium chloride 0.65% Nasal 1 Spray(s) Both Nostrils every 6 hours  vancomycin    Solution 125 milliGRAM(s) Oral every 6 hours  witch hazel Pads 1 Application(s) Topical every 12 hours    MEDICATIONS  (PRN):  acetaminophen   Oral Liquid .. 650 milliGRAM(s) Oral every 6 hours PRN Temp greater or equal to 38C (100.4F), Mild Pain (1 - 3)  aluminum hydroxide/magnesium hydroxide/simethicone Suspension 30 milliLiter(s) Enteral Tube every 4 hours PRN Dyspepsia  oxyCODONE    Solution 2.5 milliGRAM(s) Oral every 6 hours PRN Mild Pain (1 - 3)  oxyCODONE    Solution 5 milliGRAM(s) Oral every 6 hours PRN Moderate Pain (4 - 6)  oxyCODONE    Solution 10 milliGRAM(s) Oral every 6 hours PRN Severe Pain (7 - 10)      LABS:                        8.2    14.40 )-----------( 175      ( 06 Nov 2024 06:25 )             28.2     11-06    130[L]  |  100  |  21  ----------------------------<  113[H]  4.7   |  20[L]  |  <0.30[L]    Ca    7.7[L]      06 Nov 2024 06:25  Phos  3.3     11-06  Mg     2.1     11-06    TPro  5.8[L]  /  Alb  2.2[L]  /  TBili  0.4  /  DBili  x   /  AST  26  /  ALT  17  /  AlkPhos  110  11-06      Arterial Blood Gas:  11-05 @ 13:56  7.35/33/129/18/100.0/-6.7  ABG lactate: --      Mode: AC/ CMV (Assist Control/ Continuous Mandatory Ventilation)  RR (machine): 18  TV (machine): 350  FiO2: 30  PEEP: 5  ITime: 1  MAP: 11  PIP: 29 Patient is a 74y old  Female who presents with a chief complaint of Patient admitted with Hypoxemia and episode of Bright red blood per rectum (05 Nov 2024 17:34)    HPI:  75 yo F PMH T2DM on insulin, HTN, HLD, hypothyroidism, RA on Prednisone, Fibromyalgia, cerebral aneurysm s/p repair, chronic hypercapnic respiratory failure s/p trach (vent dependent) and chronic PEG 2022, recurrent C. diff infection (follows with Dr. Newman), persistent GJ tube leaks now s/p GC fistula repair 8/12 BIBEMS with daughter for respiratory distress, hypoxia to high 80s.  Pt also noted to have rectal bleeding this past week requiring transfusion 5 days ago in ED as per daughter.  In ED, pt afebrile, fiO2 96-98% on 40% on ventilator.  Chest Xray w/ opacification of right lung concerning for possible PNA.  Admitted to RCU for further management.      (07 Oct 2024 18:58)    Interval Events: Had fever of 101.2    REVIEW OF SYSTEMS:  [ ] Positive  [ ] All other systems negative  [ x] Unable to assess ROS because patient is NON-verbal    Vital Signs Last 24 Hrs  T(C): 36.8 (11-06-24 @ 05:48), Max: 38.4 (11-05-24 @ 11:55)  T(F): 98.3 (11-06-24 @ 05:48), Max: 101.2 (11-05-24 @ 11:55)  HR: 84 (11-06-24 @ 06:22) (80 - 116)  BP: 96/50 (11-06-24 @ 05:48) (96/50 - 135/61)  RR: 18 (11-06-24 @ 05:48) (18 - 19)  SpO2: 100% (11-06-24 @ 06:22) (99% - 100%)    PHYSICAL EXAM:  HEENT:   [X]Tracheostomy: #8 XLT cuffed irwinley   [X]Pupils equal  [ ]No oral lesions  [ ]Abnormal    SKIN  [ ]No Rash  [ ] Abnormal  [X] pressure - sacral unstageable    CARDIAC  [X]Regular  [ ]Abnormal    PULMONARY  [ ]Bilateral Clear Breath Sounds  [ ]Normal Excursion  [X]Abnormal - mild coarse BS     GI  [X]PEG      [X] +BS		              [X]Soft, nondistended, nontender	  [X]Abnormal - old PEG site c/d/i, + rectal tube     MUSCULOSKELETAL                                   [X]Bedbound                 [ ]Abnormal    [ ]Ambulatory/OOB to chair                           EXTREMITIES                                         [ ]Normal  [X]Edema - mild generalized pitting edema                          NEUROLOGIC  [ ] Normal, non focal  [X] Focal findings: lethargic but following commands    PSYCHIATRIC  [x] Easily arousable, lethargic  [ ] Sedated	 [ ]Agitated    :  Mcbride: [ ] Yes, if yes: Date of Placement:                   [X] No    LINES: Central Lines [ ] Yes, if yes: Date of Placement                                     [X] No    HOSPITAL MEDICATIONS:  MEDICATIONS  (STANDING):  albuterol/ipratropium for Nebulization 3 milliLiter(s) Nebulizer every 6 hours  AQUAPHOR (petrolatum Ointment) 1 Application(s) Topical once  artificial  tears Solution 1 Drop(s) Both EYES every 6 hours  ascorbic acid 500 milliGRAM(s) Oral daily  Biotene Dry Mouth Oral Rinse 5 milliLiter(s) Swish and Spit every 6 hours  calcium carbonate 1250 mG  + Vitamin D (OsCal 500 + D) 1 Tablet(s) Oral <User Schedule>  cefepime   IVPB 2000 milliGRAM(s) IV Intermittent every 8 hours  cefepime   IVPB      chlorhexidine 0.12% Liquid 15 milliLiter(s) Oral Mucosa every 12 hours  chlorhexidine 4% Liquid 1 Application(s) Topical daily  dextrose 5%. 1000 milliLiter(s) (50 mL/Hr) IV Continuous <Continuous>  dextrose 5%. 1000 milliLiter(s) (100 mL/Hr) IV Continuous <Continuous>  dextrose 5%. 1000 milliLiter(s) (100 mL/Hr) IV Continuous <Continuous>  dextrose 5%. 1000 milliLiter(s) (50 mL/Hr) IV Continuous <Continuous>  dextrose 50% Injectable 25 Gram(s) IV Push once  dextrose 50% Injectable 12.5 Gram(s) IV Push once  dextrose 50% Injectable 25 Gram(s) IV Push once  diclofenac sodium 1% Gel 2 Gram(s) Topical every 6 hours  escitalopram 10 milliGRAM(s) Oral <User Schedule>  fentaNYL   Patch  12 MICROgram(s)/Hr 1 Patch Transdermal every 72 hours  fentaNYL   Patch  25 MICROgram(s)/Hr 1 Patch Transdermal every 72 hours  gabapentin Solution 250 milliGRAM(s) Oral <User Schedule>  glucagon  Injectable 1 milliGRAM(s) IntraMuscular once  glucagon  Injectable 1 milliGRAM(s) IntraMuscular once  hemorrhoidal Ointment 1 Application(s) Rectal at bedtime  influenza  Vaccine (HIGH DOSE) 0.5 milliLiter(s) IntraMuscular once  insulin glargine Injectable (LANTUS) 14 Unit(s) SubCutaneous <User Schedule>  insulin lispro (ADMELOG) corrective regimen sliding scale   SubCutaneous every 6 hours  insulin lispro Injectable (ADMELOG) 24 Unit(s) SubCutaneous <User Schedule>  insulin lispro Injectable (ADMELOG) 36 Unit(s) SubCutaneous <User Schedule>  lactobacillus acidophilus 1 Tablet(s) Oral <User Schedule>  levothyroxine 125 MICROGram(s) Oral <User Schedule>  lidocaine   4% Patch 4 Patch Transdermal every 24 hours  melatonin 12 milliGRAM(s) Oral <User Schedule>  metroNIDAZOLE  IVPB 500 milliGRAM(s) IV Intermittent every 8 hours  nystatin Powder 1 Application(s) Topical every 8 hours  pantoprazole  Injectable 40 milliGRAM(s) IV Push every 12 hours  predniSONE  Solution 5 milliGRAM(s) Enteral Tube <User Schedule>  QUEtiapine 12.5 milliGRAM(s) Oral <User Schedule>  sodium chloride 1 Gram(s) Oral every 8 hours  sodium chloride 0.65% Nasal 1 Spray(s) Both Nostrils every 6 hours  vancomycin    Solution 125 milliGRAM(s) Oral every 6 hours  witch hazel Pads 1 Application(s) Topical every 12 hours    MEDICATIONS  (PRN):  acetaminophen   Oral Liquid .. 650 milliGRAM(s) Oral every 6 hours PRN Temp greater or equal to 38C (100.4F), Mild Pain (1 - 3)  aluminum hydroxide/magnesium hydroxide/simethicone Suspension 30 milliLiter(s) Enteral Tube every 4 hours PRN Dyspepsia  oxyCODONE    Solution 2.5 milliGRAM(s) Oral every 6 hours PRN Mild Pain (1 - 3)  oxyCODONE    Solution 5 milliGRAM(s) Oral every 6 hours PRN Moderate Pain (4 - 6)  oxyCODONE    Solution 10 milliGRAM(s) Oral every 6 hours PRN Severe Pain (7 - 10)      LABS:                        8.2    14.40 )-----------( 175      ( 06 Nov 2024 06:25 )             28.2     11-06    130[L]  |  100  |  21  ----------------------------<  113[H]  4.7   |  20[L]  |  <0.30[L]    Ca    7.7[L]      06 Nov 2024 06:25  Phos  3.3     11-06  Mg     2.1     11-06    TPro  5.8[L]  /  Alb  2.2[L]  /  TBili  0.4  /  DBili  x   /  AST  26  /  ALT  17  /  AlkPhos  110  11-06      Mode: AC/ CMV (Assist Control/ Continuous Mandatory Ventilation)  RR (machine): 18  TV (machine): 350  FiO2: 30  PEEP: 5  ITime: 1  MAP: 11  PIP: 29

## 2024-11-06 NOTE — PROGRESS NOTE ADULT - SUBJECTIVE AND OBJECTIVE BOX
Follow Up:   fevers    Interval History/ROS:  lethargic    Allergies  walnut (Unknown)  metronidazole (Rash)  Lyrica (Unknown)  penicillin (Unknown)  Pineapple (Unknown)  Tagamet (Unknown)  heparin (Unknown)  Pecans (Unknown)  Hazelnut (Unknown)  meropenem (Rash)    ANTIMICROBIALS:  cefepime   IVPB 2000 every 8 hours  metroNIDAZOLE  IVPB 500 every 8 hours  vancomycin    Solution 125 every 6 hours      OTHER MEDS:  MEDICATIONS  (STANDING):  acetaminophen   Oral Liquid .. 650 every 6 hours PRN  albuterol/ipratropium for Nebulization 3 every 6 hours  aluminum hydroxide/magnesium hydroxide/simethicone Suspension 30 every 4 hours PRN  dextrose 50% Injectable 25 once  dextrose 50% Injectable 12.5 once  dextrose 50% Injectable 25 once  escitalopram 10 <User Schedule>  fentaNYL   Patch  12 MICROgram(s)/Hr 1 every 72 hours  fentaNYL   Patch  25 MICROgram(s)/Hr 1 every 72 hours  gabapentin Solution 250 <User Schedule>  glucagon  Injectable 1 once  glucagon  Injectable 1 once  influenza  Vaccine (HIGH DOSE) 0.5 once  insulin glargine Injectable (LANTUS) 14 <User Schedule>  insulin lispro (ADMELOG) corrective regimen sliding scale  every 6 hours  insulin lispro Injectable (ADMELOG) 24 <User Schedule>  insulin lispro Injectable (ADMELOG) 36 <User Schedule>  levothyroxine 125 <User Schedule>  melatonin 12 <User Schedule>  oxyCODONE    Solution 10 every 6 hours PRN  oxyCODONE    Solution 2.5 every 6 hours PRN  oxyCODONE    Solution 5 every 6 hours PRN  pantoprazole  Injectable 40 every 12 hours  predniSONE  Solution 5 <User Schedule>  QUEtiapine 12.5 <User Schedule>      Vital Signs Last 24 Hrs  T(C): 38.8 (06 Nov 2024 12:39), Max: 38.8 (06 Nov 2024 12:39)  T(F): 101.8 (06 Nov 2024 12:39), Max: 101.8 (06 Nov 2024 12:39)  HR: 96 (06 Nov 2024 15:07) (80 - 117)  BP: 120/54 (06 Nov 2024 12:39) (96/50 - 135/61)  BP(mean): --  RR: 18 (06 Nov 2024 12:39) (18 - 19)  SpO2: 100% (06 Nov 2024 15:07) (99% - 100%)    Parameters below as of 06 Nov 2024 12:39  Patient On (Oxygen Delivery Method): ventilator        PHYSICAL EXAM:  General:  NAD, Non-toxic  Neurology: lethargic  Respiratory: modest Clear to auscultation bilaterally  CV: RRR, S1S2, no murmurs, rubs or gallops  Abdominal: Soft, Non-tender, non-distended, rectal tube  Extremities: No edema  Line Sites: Clear  Skin: No rash                          8.2    14.40 )-----------( 175      ( 06 Nov 2024 06:25 )             28.2   WBC Count: 14.40 (11-06 @ 06:25)  WBC Count: 13.29 (11-05 @ 07:05)  WBC Count: 12.33 (11-04 @ 06:29)  WBC Count: 15.12 (11-03 @ 07:21)  WBC Count: 15.69 (11-02 @ 10:09)    11-06    130[L]  |  100  |  21  ----------------------------<  113[H]  4.7   |  20[L]  |  <0.30[L]    Ca    7.7[L]      06 Nov 2024 06:25  Phos  3.3     11-06  Mg     2.1     11-06    TPro  5.8[L]  /  Alb  2.2[L]  /  TBili  0.4  /  DBili  x   /  AST  26  /  ALT  17  /  AlkPhos  110  11-06 11.06.24 @ 14:05) Blood Gas Arterial, Lactate: 1.7 mmol/L  11.01.24 @ 06:15)  Procalcitonin: 4.56:  Urinalysis Basic - ( 06 Nov 2024 06:25 )    Color: x / Appearance: x / SG: x / pH: x  Gluc: 113 mg/dL / Ketone: x  / Bili: x / Urobili: x   Blood: x / Protein: x / Nitrite: x   Leuk Esterase: x / RBC: x / WBC x   Sq Epi: x / Non Sq Epi: x / Bacteria: x        MICROBIOLOGY:  .Sputum Sputum  11-04-24   Moderate Mixed gram negative rods including  Moderate Pseudomonas aeruginosa  Commensal vinay consistent with body site  --    Rare polymorphonuclear leukocytes per low power field  No Squamous epithelial cells per low power field  Moderate Gram Negative Rods seen per oil power field  Few Gram Positive Rods seen per oil power field      Clean Catch Clean Catch (Midstream)  11-03-24   No growth  --  --      .Blood BLOOD  11-03-24   No growth at 72 Hours  --  --      .Blood BLOOD  11-03-24   No growth at 72 Hours  --  --      .Blood BLOOD  11-01-24   Growth in anaerobic bottle: Staphylococcus epidermidis  Isolation of Coagulase negative Staphylococcus from single blood culture  sets may represent  contamination. Contact the Microbiology Department at [phone] if  susceptibility testing is needed.  clinically indicated.  Direct identification is available within approximately 3-5  hours either by Blood Panel Multiplexed PCR or Direct  MALDI-TOF. Details: [email or web address]  --  Blood Culture PCR      Catheterized Catheterized  11-01-24   No growth  --  --      .Blood BLOOD  11-01-24   No growth at 5 days  --  --      Trach Asp Tracheal Aspirate  10-27-24   Moderate Pseudomonas aeruginosa  Moderate Pseudomonas aeruginosa #2  Multiple Morphological Strains  Commensal vinay consistent with body site  --  Pseudomonas aeruginosa  Pseudomonas aeruginosa      .Blood BLOOD  10-27-24   No growth at 5 days  --  --      .Blood BLOOD  10-17-24   No growth at 5 days  --  --      .Blood BLOOD  10-17-24   No growth at 5 days  --  --      .Blood BLOOD  10-15-24   No growth at 5 days  --  --      Trach Asp Tracheal Aspirate  10-15-24   Mixed gram negative rods including  Numerous Pseudomonas aeruginosa  Commensal vinay consistent with body site  --  Pseudomonas aeruginosa      .Blood BLOOD  10-15-24   No growth at 5 days  --  --      Trach Asp Tracheal Aspirate  10-08-24   Few Pseudomonas aeruginosa  Commensal vinay consistent with body site  --  Pseudomonas aeruginosa        .Sputum Sputum    v      Clostridium difficile GDH Toxins A&amp;B, EIA:   Positive (11-01-24 @ 13:44)  Clostridium difficile GDH Interpretation: Positive for toxigenic C. Difficile.  This specimen is positive for C.  Difficile glutamate dehydrogenase (GDH) antigen and positive for C.  Difficile Toxins A & B, by EIA.  GDH is a highly sensitive marker for C.  Difficile that is produced in largeamounts by all C. Difficile strains,  both toxigenic and nontoxigenic.  This assay has not been validated as a  test of cure.  The results of this assay should always be interpreted in  conjunction with patient's clinical history. (11-01-24 @ 13:44)      RADIOLOGY:    Zion Newman MD; Division of Infectious Disease; Pager: [phone]; nights and weekends: [phone]

## 2024-11-06 NOTE — PROGRESS NOTE ADULT - PROBLEM SELECTOR PLAN 2
[] PNA  - CXR 10/7: Opacification of the right middle lobe may represent pneumonia versus atelectasis  - sputum culture 10/8 PSA  - completed course of cefepime on 10/12  - CT A/P 10/18: ?multifocal pneumonia, presence of gastrocutaneous fistula; otherwise no collections/abscess noted  - Txd with meropenem 10/15-10/23; d/c'd for persistent diarrhea    [] C.Diff / Fevers  - cdiff + 11/1   - Febrile 102.2 Today   - Bld cx 11/1: MR Tristan Epi, Repeat Bld cx 11/3: NGTD  - Urine cx 11/3: NGTD, Sputum cx 11/4: ( Sent/ Results pending )  - F/u Repeat CXR   - Cont po Vanco / Flagyl  - ID Continues to follow [] PNA  - CXR 10/7: Opacification of the right middle lobe may represent pneumonia versus atelectasis  - sputum culture 10/8 PSA  - completed course of cefepime on 10/12  - CT A/P 10/18: ?multifocal pneumonia, presence of gastrocutaneous fistula; otherwise no collections/abscess noted  - Txd with meropenem 10/15-10/23; d/c'd for persistent diarrhea    [] C.Diff / Fevers  - cdiff + 11/1   - Febrile 102.2 Today   - Bld cx 11/1: MR Tristan Epi, Repeat Bld cx 11/3: NGTD  - Urine cx 11/3: NGTD, Sputum cx 11/4: ( Sent/ Results pending )  - F/u Repeat CXR   - Cont po Vanco / Flagyl  - ID Continues to follow  - will give 3 days on cefepime, ID to determine if will continue

## 2024-11-07 NOTE — PROGRESS NOTE ADULT - ASSESSMENT
75 yo F PMH T2DM on insulin, HTN, HLD, hypothyroidism, RA on Prednisone, Fibromyalgia, cerebral aneurysm s/p repair, chronic hypercapnic respiratory failure s/p trach (vent dependent) and chronic PEG 2022, recurrent C. diff infection (follows with Dr. 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  Pt also noted to have rectal bleeding this past week requiring transfusion 5 days ago in ED as per daughter. Daughter also reports brownish discharge from G-J tube. In ED, pt afebrile, fiO2 96-98% on 40% on ventilator.  Chest X-ray w/ opacification of right lung concerning for possible PNA.  Admitted to RCU for further management. Sputum cxs grew PSA; treated with course of Cefepime. Patient with decreased H+H received 1 unit of PRBCS on 10/10.  10/14 EGD w/ Dr. Rosales for PEGJ exchange and clippings placed for closure of fistula site.  Persistent fevers treated with meropenem and vanc (d/c'd 10/20).  CT A/P without infectious source.  Continued fevers and persistent leukocytosis 11/1 CDiff positive - po vanco started, IV flagyl added 11/3.  Continue airway management with duonebs, chest PT and suction PRN.            11/6:  Febrile 101.8, Placed on Cefepime for CR pseudomonas in sputum.  - Tylenol given.  Blood cultures/Urine culture 11/3 NGTD.  Rectal tube placed overnight - liquid diarrhea in bag.  Pt more lethargic as per family members and bedside aides.  Ordered CT H, C/A/P. Source not confirmed, most likely metabolic encephalopathy. 73 yo F PMH T2DM on insulin, HTN, HLD, hypothyroidism, RA on Prednisone, Fibromyalgia, cerebral aneurysm s/p repair, chronic hypercapnic respiratory failure s/p trach (vent dependent) and chronic PEG 2022, recurrent C. diff infection (follows with Dr. 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  Pt also noted to have rectal bleeding this past week requiring transfusion 5 days ago in ED as per daughter. Daughter also reports brownish discharge from G-J tube. In ED, pt afebrile, fiO2 96-98% on 40% on ventilator.  Chest X-ray w/ opacification of right lung concerning for possible PNA.  Admitted to RCU for further management. Sputum cxs grew PSA; treated with course of Cefepime. Patient with decreased H+H received 1 unit of PRBCS on 10/10.  10/14 EGD w/ Dr. Rosales for PEGJ exchange and clippings placed for closure of fistula site.  Persistent fevers treated with meropenem and vanc (d/c'd 10/20).  CT A/P without infectious source.  Continued fevers and persistent leukocytosis 11/1 CDiff positive - po vanco started, IV flagyl added 11/3.  Continue airway management with duonebs, chest PT and suction PRN.            11/7: Placed on Cefepime for CR pseudomonas in sputum.  Blood cultures/Urine culture 11/7 NGTD.  Rectal tube placed overnight - liquid diarrhea in bag.  Pt more lethargic CT H, C/A/P. No clear source, most likely metabolic encephalopathy.

## 2024-11-07 NOTE — PROGRESS NOTE ADULT - PROBLEM SELECTOR PLAN 12
- D/C planning when medically stable  - Patients daughter updated over the phone by RCU Team - D/C planning when medically stable  - Patients daughter updated at bedside by RCU Team

## 2024-11-07 NOTE — PROGRESS NOTE ADULT - NUTRITIONAL ASSESSMENT
Diet, NPO with Tube Feed:   Tube Feeding Modality: Jejunostomy  [employer] Peptide 1.5 (KFPEPT1.5RTH)  Total Volume for 24 Hours (mL): 960  Continuous  Until Goal Tube Feed Rate (mL per Hour): 80  Tube Feed Duration (in Hours): 12  Tube Feed Start Time: 06:00  Free Water Flush  Pump   Rate (mL per Hour): 50   Frequency: Every Hour  Free Water Flush Instructions:  50ml free water flushes Q1hr  Alfred(7 Gm Arginine/7 Gm Glut/1.2 Gm HMB     Qty per Day:  2  No Carb Prosource (1pkg = 15gms Protein)     Qty per Day:  1  Banatrol TF     Qty per Day:  1/2 packet per daily (11-01-24 @ 08:28) [Active]

## 2024-11-07 NOTE — PROGRESS NOTE ADULT - ASSESSMENT
73 yo woman PMH T2DM on insulin, HTN, HLD, hypothyroidism, RA on Prednisone, Fibromyalgia, cerebral aneurysm s/p repair, chronic hypercapnic respiratory failure s/p trach (vent dependent) and chronic PEG 2022, recurrent C. diff infection , persistent GJ tube leaks now s/p GC fistula repair 8/12  admitted 10/7/24 with resp distress and found to have RML opacity     Antibiotics  Ceftriaxone 10/7  Azithro 10/7  Cefepime 10/7--> 10/12  meropenem 10/15 --> 10/23  IV Vanco 10/17 --> 10/21  Vanco via NGT 10/24; 11/1-->  Metronidazole 11/3 --> 11/7  Cefepime 11/5 -->      10/10 melena, anemia, blood tx  10/14 EGD for GJ exchange and piror PEG site closure  One benign-appearing, intrinsic moderate stenosis was found in the upper third of the        esophagus. The stenosis was traversed.       The cardia and gastric fundus were normal.       A gastric tube with tip in the jejunum was found in the gastric body. The PEG required        removal because it was leaking. The J tube extension (12F) was removed first. An externally        removable 24 Fr EndoVive Safety gastrostomy tube was lubricated. The guide wire was passed        through the existing G-tube port and snared endoscopically. The endoscope and snare were then        removed, pulling the wire out through the mouth. The g-tube was tied to the guidewire, pulled        through the mouth into the stomach and then pulled out from the stomach through the skin. The        bumper was attached to the gastrostomy tube. The feeding tube was then cut to an appropriate        length. The final position of the gastrostomy tube was confirmed by relook endoscopy, and        skin marking noted to be 3 cm at the external bumper. The final tension and compression of        the abdominal wall by the PEG tube and external bumper were checked and revealed that the        bumper was moderately tight and mildly deforming the skin. The J tube extension (12 F        EndoVive Jejunal feeding tube) was advanced into the stomach. The tip of the J tube had a        loop thread that was captured with a Resolution clip. The thread and the J tube extension        were advanced to the proximal jejunum, and the endoclip along with the tip of the J tube was        attached to the wall securely. The J tube extension was capped, and the tube site was cleaned        and dressed.       A small fistula was found on the anterior wall of the gastric body related to prior G tube        site. Coagulation of tissue near the fistula using argon plasma at 1.2 liters/minute and 35        peraza was successful. To closure the gastrocutaneous fistula, four hemostatic clips were        successfully placed (MR conditional, two 16 mm DuraClip and 2 Mantis clips.       The examined duodenum was normal.    10/14, 10/15 Fever, transient hypoxia post procedure  10/15 ProCalcitonin = 8.48 suggestive of bacterial process; BCx x2 NGTD; Trach: Pseudomonas   - though benign mg stain  10;16 Leukocytosis WBC = 14.26  10/17 fever, hypotension, abd distension, no diarrhea noted this am WBC = 15.02; Rising Procalcitonin = 38.49; Obstructed J tube   10/18  lost IV access re-gained  - CT scan late this afternoon  WBC = 8.68; Rising Procalcitonin >100  10/18 imaging suggests multifocal pneumonia  10/21 improved chest exam, Procalcitonin level considerably decreased, decreased FiO2  - afebrile since 10/18  10/21 UGI endoscopy done  Findings:       The esophagus was normal. A fistula was found on the anterior wall of the gastric body.       There was evidence of a gastrostomy present in the gastric body. The patient was placed in        the supine position. The endoscope was advanced to the jejunum and the prior placed J tube        with loop attached to the wall and the loop thread was cut by endoclip. The J tube and the G        tube were removed. The gastrostomy fistula was utilized and an Avanos 22 F        Gastrostomy/Jejunal tube was advanced. The jejunal tip was advanced through the pylorus and        into the duodenum. The endoscope was then advanced to the duodenum and the loop thread at the        tip of the J tube was captured and advanced to the proximal jejunum. The loop thread was        clipped to wall by a [employer] Resolution clip. The J tube flushed easily and the G        tube decompress the abdomen easily. The internal balloon was insufflated with 10 cc of water        to hold the GJ tube in place. The outside marking was at 3.  10/23  no distress, liquid stools noted  - Meropenem discontinued  GI PCR NEG  10/24  persistent diarrhea  Cdiff NEG; enteral vanco stopped and Immodium provided  10/28 low grade temps, mild leukocytosis  10/30 blood transfusion  11/1  fever  +Cdiff  11/3 continued fever  - +BC Stph epi most likely procurement contaminant,  modest diarrhea reported  - daughter described increased diarrhea in evenings - Pseudomonas airway colonization is long term, no suggestion of pneumonia at present, sacral decub remains superifical will granulated without signs of infection  11/4  - fever, abnormal chest exam though FIO2 still 30%  rectal tube inserted for increased diarrhea  11/5 lethargy, fever, leukocytosis, increased lactate, hyponatremia  11/6 sputum gram stain suggests persistent Pseudomonas colonization  - continued fever, leukocytosis  11/7 unclear if LLL consolidation represents infection. Repeat Procalcitonin  11/7= 2.92 decreased      Issues  recurrent Cdiff  cerebral aneurysm s/p repair  chronic hypercapnic respiratory failure s/p trach (vent dependent) and chronic PEG 2022  anemia  - had iron deficiency  Pseudomonas aeruginosa upper airway colonization  (most recent isolate Resist to Cipro/Levo)  T2DM on insulin  10.14.24 -A1C: 5.8%  HTN  HLD  hypothyroidism  RA on Prednisone  Fibromyalgia  debility  sacral ulcer largely healed  malnourished/chronic catabolic state      Suggest  Continue vanco 125 q 6 hours via J tube 11/1 -->  STOP Flagyl 11/3-->11/7  Cefepime 11/5 -->  continue through 11/19      discussed with RCU team and daughter

## 2024-11-07 NOTE — PROGRESS NOTE ADULT - SUBJECTIVE AND OBJECTIVE BOX
Follow Up:   fevers    Interval History/ROS:  lethargy    Allergies  walnut (Unknown)  metronidazole (Rash)  Lyrica (Unknown)  penicillin (Unknown)  Pineapple (Unknown)  Tagamet (Unknown)  heparin (Unknown)  Pecans (Unknown)  Hazelnut (Unknown)  meropenem (Rash)        ANTIMICROBIALS:  cefepime   IVPB 2000 every 8 hours  metroNIDAZOLE  IVPB 500 every 8 hours  vancomycin    Solution 125 every 6 hours      OTHER MEDS:  MEDICATIONS  (STANDING):  acetaminophen   Oral Liquid .. 650 every 6 hours PRN  albuterol/ipratropium for Nebulization 3 every 6 hours  aluminum hydroxide/magnesium hydroxide/simethicone Suspension 30 every 4 hours PRN  dextrose 50% Injectable 25 once  dextrose 50% Injectable 25 once  dextrose 50% Injectable 12.5 once  escitalopram 10 <User Schedule>  fentaNYL   Patch  12 MICROgram(s)/Hr 1 every 72 hours  fentaNYL   Patch  25 MICROgram(s)/Hr 1 every 72 hours  gabapentin Solution 250 <User Schedule>  glucagon  Injectable 1 once  glucagon  Injectable 1 once  influenza  Vaccine (HIGH DOSE) 0.5 once  insulin lispro (ADMELOG) corrective regimen sliding scale  every 6 hours  levothyroxine 125 <User Schedule>  melatonin 12 <User Schedule>  oxyCODONE    Solution 10 every 6 hours PRN  oxyCODONE    Solution 2.5 every 6 hours PRN  oxyCODONE    Solution 5 every 6 hours PRN  pantoprazole  Injectable 40 every 12 hours  predniSONE  Solution 5 <User Schedule>  QUEtiapine 12.5 <User Schedule>      Vital Signs Last 24 Hrs  T(C): 36.3 (07 Nov 2024 12:08), Max: 36.7 (07 Nov 2024 04:31)  T(F): 97.4 (07 Nov 2024 12:08), Max: 98 (07 Nov 2024 04:31)  HR: 98 (07 Nov 2024 17:35) (70 - 113)  BP: 144/99 (07 Nov 2024 12:08) (102/52 - 144/99)  BP(mean): --  RR: 18 (07 Nov 2024 12:08) (18 - 19)  SpO2: 100% (07 Nov 2024 17:35) (98% - 100%)    Parameters below as of 07 Nov 2024 17:35  Patient On (Oxygen Delivery Method): ventilator        PHYSICAL EXAM:  General: ill appaering , Non-toxic  Neurology: A&Ox3, nonfocal  Respiratory: Clear to auscultation bilaterally  CV: RRR, S1S2, no murmurs, rubs or gallops  Abdominal: Soft, Non-tender, non-distended, normal bowel sounds  Extremities: generized  edema,  Line Sites: Clear  Skin: No rash  sacrum superficial  stage 2 depth, no purulence                          7.6    12.94 )-----------( 174      ( 07 Nov 2024 06:49 )             25.8   WBC Count: 12.94 (11-07 @ 06:49)  WBC Count: 14.40 (11-06 @ 06:25)  WBC Count: 13.29 (11-05 @ 07:05)  WBC Count: 12.33 (11-04 @ 06:29)  WBC Count: 15.12 (11-03 @ 07:21)  11-07    130[L]  |  101  |  18  ----------------------------<  93  4.3   |  19[L]  |  <0.30[L]    Ca    7.8[L]      07 Nov 2024 06:48  Phos  3.3     11-07  Mg     1.9     11-07    TPro  5.8[L]  /  Alb  2.1[L]  /  TBili  0.4  /  DBili  x   /  AST  25  /  ALT  18  /  AlkPhos  119  11-07    MICROBIOLOGY:  .Sputum Sputum  11-04-24   Moderate Mixed gram negative rods including  Moderate Pseudomonas aeruginosa  Commensal vinay consistent with body site  --  Pseudomonas aeruginosa      Clean Catch Clean Catch (Midstream)  11-03-24   No growth  --  --      .Blood BLOOD  11-03-24   No growth at 4 days  --  --      .Blood BLOOD  11-03-24   No growth at 4 days  --  --      .Blood BLOOD  11-01-24   Growth in anaerobic bottle: Staphylococcus epidermidis  Isolation of Coagulase negative Staphylococcus from single blood culture  sets may represent  contamination. Contact the Microbiology Department at [phone] if  susceptibility testing is needed.  clinically indicated.  Direct identification is available within approximately 3-5  hours either by Blood Panel Multiplexed PCR or Direct  MALDI-TOF. Details: [email or web address]  --  Blood Culture PCR      Catheterized Catheterized  11-01-24   No growth  --  --      .Blood BLOOD  11-01-24   No growth at 5 days  --  --      Trach Asp Tracheal Aspirate  10-27-24   Moderate Pseudomonas aeruginosa  Moderate Pseudomonas aeruginosa #2  Multiple Morphological Strains  Commensal vinay consistent with body site  --  Pseudomonas aeruginosa  Pseudomonas aeruginosa      .Blood BLOOD  10-27-24   No growth at 5 days  --  --      .Blood BLOOD  10-17-24   No growth at 5 days  --  --      .Blood BLOOD  10-17-24   No growth at 5 days  --  --      .Blood BLOOD  10-15-24   No growth at 5 days  --  --      Trach Asp Tracheal Aspirate  10-15-24   Mixed gram negative rods including  Numerous Pseudomonas aeruginosa  Commensal vinay consistent with body site  --  Pseudomonas aeruginosa      .Blood BLOOD  10-15-24   No growth at 5 days  --  --      Clostridium difficile GDH Toxins A&amp;B, EIA:   Positive (11-01-24 @ 13:44)  Clostridium difficile GDH Interpretation: Positive for toxigenic C. Difficile.  This specimen is positive for C.  Difficile glutamate dehydrogenase (GDH) antigen and positive for C.  Difficile Toxins A & B, by EIA.   (11-01-24 @ 13:44)      RADIOLOGY:  < from: CT Chest w/ IV Cont (11.06.24 @ 17:12) >  FINDINGS:  CHEST:  LUNGS AND LARGE AIRWAYS: Tracheostomy tube. Consolidative opacity in the   left lower lobe, which may be on the basis of segmental atelectasis.   Additional bilateral subsegmental atelectasis. Right lower lobe calcified   granuloma.  PLEURA: Small left and trace right pleural effusions, decreased from   10/18/2024.  VESSELS: Atherosclerotic changes. Coronary artery calcifications.  HEART: Heart size is normal. Aortic valve and mitral annular   calcification. No pericardial effusion.  MEDIASTINUM AND ROSEMARY: Mildly enlarged mediastinal lymph nodes are similar   to the prior CT. For reference, a precarinal node measures 1.7 x 1.2 cm   (series 3 image 46).  CHEST WALL AND LOWER NECK: Within normal limits.    ABDOMEN AND PELVIS:  LIVER: Question steatosis.  BILE DUCTS: Normal caliber.  GALLBLADDER: Cholelithiasis.  SPLEEN: Within normal limits.  PANCREAS: Within normal limits.  ADRENALS: Within normal limits.  KIDNEYS/URETERS: No hydronephrosis. Subcentimeter hypodense focus in the   right kidney that is too small to characterize.    BLADDER: Underdistended. Mild wall thickening. Small amount of   hyperdensity in the dependent aspect of the urinary bladder, which may   reflect stones.  REPRODUCTIVE ORGANS: Uterus and adnexa within normal limits.    BOWEL: Percutaneous gastrojejunostomy catheter, which initially courses   towards the gastric fundus before looping back towards the gastric   pylorus, terminating in the first/second segment of the duodenum. This is   similar in course to the prior CT, though previously terminated at the   duodenojejunal junction. Rectal tube. No bowel obstruction. Appendix is   not visualized.  PERITONEUM/RETROPERITONEUM: Within normal limits.  VESSELS: Atherosclerotic changes. Embolization material in the   gastrohepatic region. Peripherally calcified splenic artery aneurysm   measuring 8 mm, unchanged.  LYMPH NODES: Small retroperitoneal lymph nodes are similar to the prior   CT. For reference, a left para-aortic lymph node measures 1.4 x 1.0 cm   (series 3 image 168).  ABDOMINAL WALL: Subcutaneous edema. Redemonstrated gastrocutaneous   fistulous tract from the course of a previousgastrostomy tube, which   contains less gas on the current exam. Tiny fat-containing umbilical   hernia. Soft tissue infiltration overlying the lower sacrum, similar to   the previous exam. Subjacent erosive changes to the lower sacrum, which   also appears similar to the previous exam.  BONES: Chronic left rib fractures. Chronic left superior and inferior   pubic rami fractures. Multilevel thoracolumbar compression fracture   deformities, unchanged from the prior exam. Findings pertaining to the  sacrum as above. Degenerative changes.    IMPRESSION:    CHEST:  *  Consolidative opacity in the left lower lobe, which may be due to   atelectasis. Superimposed infection is not excluded.  *  Small left and trace right pleural effusions, decreasedfrom the prior   CT.    ABDOMEN AND PELVIS:  *  Soft tissue infiltration overlying the lower sacrum, possibly related   to a decubitus ulcer. Correlate with physical exam. Associated erosive   changes to the lower sacrum, similar to the prior CT, possibly reflecting   osteomyelitis of uncertain chronicity. Consider further evaluation with   bony pelvis MRI as warranted.  *  Mild urinary bladder wall thickening, which may be secondary to   underdistention or cystitis. Correlate with urinalysis.  * Percutaneous gastrojejunostomy catheter terminating in the proximal   duodenum, with the tip retracted in position in comparison to the prior   CT exam.  *  Redemonstrated gastrocutaneous fistulous tract from the course of a   previous gastrostomy tube, which contains less gas in comparison to the   prior CT.    < end of copied text >      Zion Newman MD; Division of Infectious Disease; Pager: [phone]; nights and weekends: [phone]

## 2024-11-07 NOTE — PROGRESS NOTE ADULT - SUBJECTIVE AND OBJECTIVE BOX
Patient seen today for follow up inpatient Diabetes Mellitus management.    Chief Complaint: Type 2 Diabetes Mellitus (T1DM, Thyroid disorder, adrenal disorder)    INTERVAL HX:  Patient seen in [facility] RCU1 505 W1. Patient is alert and oriented (X). BG have been stable and mostly at goal 100-180mg/dL while on a Consistent Carbohydrate Diet (TF (bolus vs cont), cardiac diet, low carb). Patient has been tolerating diet well. Blood glucose levels in the last 24hrs have been X.     Review of Systems:  General: As above.  Respiratory: FARAZ d/t mental status.  Gastrointestinal: FARAZ d/t mental status.  Endocrine: FARAZ d/t mental status.    Allergies  walnut (Unknown)  metronidazole (Rash)  Lyrica (Unknown)  penicillin (Unknown)  Pineapple (Unknown)  Tagamet (Unknown)  heparin (Unknown)  Pecans (Unknown)  Hazelnut (Unknown)  meropenem (Rash)      Intolerances  None.       MEDICATIONS  (STANDING):  acetaminophen   Oral Liquid .. 650 milliGRAM(s) Oral every 6 hours PRN  albuterol/ipratropium for Nebulization 3 milliLiter(s) Nebulizer every 6 hours  aluminum hydroxide/magnesium hydroxide/simethicone Suspension 30 milliLiter(s) Enteral Tube every 4 hours PRN  AQUAPHOR (petrolatum Ointment) 1 Application(s) Topical once  artificial  tears Solution 1 Drop(s) Both EYES every 6 hours  ascorbic acid 500 milliGRAM(s) Oral daily  Biotene Dry Mouth Oral Rinse 5 milliLiter(s) Swish and Spit every 6 hours  calcium carbonate 1250 mG  + Vitamin D (OsCal 500 + D) 1 Tablet(s) Oral <User Schedule>  cefepime   IVPB 2000 milliGRAM(s) IV Intermittent every 8 hours  chlorhexidine 0.12% Liquid 15 milliLiter(s) Oral Mucosa every 12 hours  chlorhexidine 4% Liquid 1 Application(s) Topical daily  dextrose 5%. 1000 milliLiter(s) IV Continuous <Continuous>  dextrose 5%. 1000 milliLiter(s) IV Continuous <Continuous>  dextrose 5%. 1000 milliLiter(s) IV Continuous <Continuous>  dextrose 5%. 1000 milliLiter(s) IV Continuous <Continuous>  dextrose 50% Injectable 12.5 Gram(s) IV Push once  dextrose 50% Injectable 25 Gram(s) IV Push once  dextrose 50% Injectable 25 Gram(s) IV Push once  diclofenac sodium 1% Gel 2 Gram(s) Topical every 6 hours  escitalopram 10 milliGRAM(s) Oral <User Schedule>  fentaNYL   Patch  12 MICROgram(s)/Hr 1 Patch Transdermal every 72 hours  fentaNYL   Patch  25 MICROgram(s)/Hr 1 Patch Transdermal every 72 hours  gabapentin Solution 250 milliGRAM(s) Oral <User Schedule>  glucagon  Injectable 1 milliGRAM(s) IntraMuscular once  glucagon  Injectable 1 milliGRAM(s) IntraMuscular once  hemorrhoidal Ointment 1 Application(s) Rectal at bedtime  influenza  Vaccine (HIGH DOSE) 0.5 milliLiter(s) IntraMuscular once  insulin lispro (ADMELOG) corrective regimen sliding scale   SubCutaneous every 6 hours  insulin lispro Injectable (ADMELOG) 36 Unit(s) SubCutaneous <User Schedule>  lactobacillus acidophilus 1 Tablet(s) Oral <User Schedule>  levothyroxine 125 MICROGram(s) Oral <User Schedule>  lidocaine   4% Patch 4 Patch Transdermal every 24 hours  melatonin 12 milliGRAM(s) Oral <User Schedule>  metroNIDAZOLE  IVPB 500 milliGRAM(s) IV Intermittent every 8 hours  nystatin Powder 1 Application(s) Topical every 8 hours  oxyCODONE    Solution 2.5 milliGRAM(s) Oral every 6 hours PRN  oxyCODONE    Solution 5 milliGRAM(s) Oral every 6 hours PRN  oxyCODONE    Solution 10 milliGRAM(s) Oral every 6 hours PRN  pantoprazole  Injectable 40 milliGRAM(s) IV Push every 12 hours  predniSONE  Solution 5 milliGRAM(s) Enteral Tube <User Schedule>  QUEtiapine 12.5 milliGRAM(s) Oral <User Schedule>  sodium chloride 1 Gram(s) Oral every 8 hours  sodium chloride 0.65% Nasal 1 Spray(s) Both Nostrils every 6 hours  vancomycin    Solution 125 milliGRAM(s) Oral every 6 hours  witch hazel Pads 1 Application(s) Topical every 12 hours      dextrose 50% Injectable 25 Gram(s) IV Push once  dextrose 50% Injectable 25 Gram(s) IV Push once  dextrose 50% Injectable 12.5 Gram(s) IV Push once  glucagon  Injectable 1 milliGRAM(s) IntraMuscular once  glucagon  Injectable 1 milliGRAM(s) IntraMuscular once  insulin lispro (ADMELOG) corrective regimen sliding scale   SubCutaneous every 6 hours  insulin lispro Injectable (ADMELOG) 36 Unit(s) SubCutaneous <User Schedule>  levothyroxine 125 MICROGram(s) Oral <User Schedule>  predniSONE  Solution 5 milliGRAM(s) Enteral Tube <User Schedule>      insulin lispro (ADMELOG) corrective regimen sliding scale   SubCutaneous every 6 hours  insulin lispro Injectable (ADMELOG) 36 Unit(s) SubCutaneous <User Schedule>      PHYSICAL EXAM:  VITALS:   T(C): 36.7 (11-07-24 @ 04:31), Max: 38.8 (11-06-24 @ 12:39)  HR: 92 (11-07-24 @ 11:43) (70 - 117)  BP: 132/61 (11-07-24 @ 04:31) (102/52 - 140/58)  RR: 18 (11-07-24 @ 04:31) (18 - 19)  SpO2: 100% (11-07-24 @ 11:43) (100% - 100%)    GENERAL: In no acute distress  Respiratory: Respirations unlabored, remains on vent via trach  Extremities: Warm and dry, no edema  NEURO: Alert and appropriate     LABS:  POCT Blood Glucose.: 89 mg/dL (11-07-24 @ 12:02)  POCT Blood Glucose.: 89 mg/dL (11-07-24 @ 05:59)  POCT Blood Glucose.: 178 mg/dL (11-06-24 @ 23:57)  POCT Blood Glucose.: 248 mg/dL (11-06-24 @ 18:02)  POCT Blood Glucose.: 165 mg/dL (11-06-24 @ 12:31)  POCT Blood Glucose.: 129 mg/dL (11-06-24 @ 05:37)  POCT Blood Glucose.: 219 mg/dL (11-06-24 @ 00:52)  POCT Blood Glucose.: 283 mg/dL (11-05-24 @ 23:38)  POCT Blood Glucose.: 309 mg/dL (11-05-24 @ 17:46)  POCT Blood Glucose.: 276 mg/dL (11-05-24 @ 11:59)  POCT Blood Glucose.: 223 mg/dL (11-05-24 @ 08:11)  POCT Blood Glucose.: 133 mg/dL (11-05-24 @ 06:00)  POCT Blood Glucose.: 142 mg/dL (11-05-24 @ 00:26)  POCT Blood Glucose.: 280 mg/dL (11-04-24 @ 17:38)                          7.6    12.94 )-----------( 174      ( 07 Nov 2024 06:49 )             25.8     11-07    130[L]  |  101  |  18  ----------------------------<  93  4.3   |  19[L]  |  <0.30[L]    Ca    7.8[L]      07 Nov 2024 06:48  Phos  3.3     11-07  Mg     1.9     11-07    TPro  5.8[L]  /  Alb  2.1[L]  /  TBili  0.4  /  DBili  x   /  AST  25  /  ALT  18  /  AlkPhos  119  11-07    LIVER FUNCTIONS - ( 07 Nov 2024 06:48 )  Alb: 2.1 g/dL / Pro: 5.8 g/dL / ALK PHOS: 119 U/L / ALT: 18 U/L / AST: 25 U/L / GGT: x                 Urinalysis Basic - ( 07 Nov 2024 06:48 )    Color: x / Appearance: x / SG: x / pH: x  Gluc: 93 mg/dL / Ketone: x  / Bili: x / Urobili: x   Blood: x / Protein: x / Nitrite: x   Leuk Esterase: x / RBC: x / WBC x   Sq Epi: x / Non Sq Epi: x / Bacteria: x        Culture - Sputum (collected 04 Nov 2024 17:37)  Source: .Sputum Sputum  Gram Stain (05 Nov 2024 07:16):    Rare polymorphonuclear leukocytes per low power field    No Squamous epithelial cells per low power field    Moderate Gram Negative Rods seen per oil power field    Few Gram Positive Rods seen per oil power field  Final Report (06 Nov 2024 19:28):    Moderate Mixed gram negative rods including    Moderate Pseudomonas aeruginosa    Commensal vinay consistent with body site  Organism: Pseudomonas aeruginosa (06 Nov 2024 19:28)  Organism: Pseudomonas aeruginosa (06 Nov 2024 19:28)        A1C with Estimated Average Glucose Result: A1C with Estimated Average Glucose Result: 5.8 % (10-14-24 @ 05:08)  A1C with Estimated Average Glucose Result: 6.4 % (08-12-24 @ 07:32)       Patient seen today for follow up inpatient Diabetes Mellitus management.    Chief Complaint: Type 2 Diabetes Mellitus     INTERVAL HX:  Patient seen in [facility] RCU1 505 W1. Patient is alert and appropriate, remains trached to ventilator. Patient continues on 16hr TFs [place] @80cc/hr, goal, tolerating. Positive for diarrhea. FBG 89mg/dL this am. BG at 1200 89mg/dL- noted TFs were held for most of the morning given staff cleaning up patient and adjusting TF orders for water flush 50cc q1h. No hypoglycemia. Blood glucose levels in the last 24hrs have been 89-248mg/dL.     Review of Systems:  General: As above.  Respiratory: FARAZ d/t mental status.  Gastrointestinal: FARAZ d/t mental status.  Endocrine: FARAZ d/t mental status.    Allergies  walnut (Unknown)  metronidazole (Rash)  Lyrica (Unknown)  penicillin (Unknown)  Pineapple (Unknown)  Tagamet (Unknown)  heparin (Unknown)  Pecans (Unknown)  Hazelnut (Unknown)  meropenem (Rash)      Intolerances  None.       MEDICATIONS  (STANDING):  acetaminophen   Oral Liquid .. 650 milliGRAM(s) Oral every 6 hours PRN  albuterol/ipratropium for Nebulization 3 milliLiter(s) Nebulizer every 6 hours  aluminum hydroxide/magnesium hydroxide/simethicone Suspension 30 milliLiter(s) Enteral Tube every 4 hours PRN  AQUAPHOR (petrolatum Ointment) 1 Application(s) Topical once  artificial  tears Solution 1 Drop(s) Both EYES every 6 hours  ascorbic acid 500 milliGRAM(s) Oral daily  Biotene Dry Mouth Oral Rinse 5 milliLiter(s) Swish and Spit every 6 hours  calcium carbonate 1250 mG  + Vitamin D (OsCal 500 + D) 1 Tablet(s) Oral <User Schedule>  cefepime   IVPB 2000 milliGRAM(s) IV Intermittent every 8 hours  chlorhexidine 0.12% Liquid 15 milliLiter(s) Oral Mucosa every 12 hours  chlorhexidine 4% Liquid 1 Application(s) Topical daily  dextrose 5%. 1000 milliLiter(s) IV Continuous <Continuous>  dextrose 5%. 1000 milliLiter(s) IV Continuous <Continuous>  dextrose 5%. 1000 milliLiter(s) IV Continuous <Continuous>  dextrose 5%. 1000 milliLiter(s) IV Continuous <Continuous>  dextrose 50% Injectable 12.5 Gram(s) IV Push once  dextrose 50% Injectable 25 Gram(s) IV Push once  dextrose 50% Injectable 25 Gram(s) IV Push once  diclofenac sodium 1% Gel 2 Gram(s) Topical every 6 hours  escitalopram 10 milliGRAM(s) Oral <User Schedule>  fentaNYL   Patch  12 MICROgram(s)/Hr 1 Patch Transdermal every 72 hours  fentaNYL   Patch  25 MICROgram(s)/Hr 1 Patch Transdermal every 72 hours  gabapentin Solution 250 milliGRAM(s) Oral <User Schedule>  glucagon  Injectable 1 milliGRAM(s) IntraMuscular once  glucagon  Injectable 1 milliGRAM(s) IntraMuscular once  hemorrhoidal Ointment 1 Application(s) Rectal at bedtime  influenza  Vaccine (HIGH DOSE) 0.5 milliLiter(s) IntraMuscular once  insulin lispro (ADMELOG) corrective regimen sliding scale   SubCutaneous every 6 hours  insulin lispro Injectable (ADMELOG) 36 Unit(s) SubCutaneous <User Schedule>  lactobacillus acidophilus 1 Tablet(s) Oral <User Schedule>  levothyroxine 125 MICROGram(s) Oral <User Schedule>  lidocaine   4% Patch 4 Patch Transdermal every 24 hours  melatonin 12 milliGRAM(s) Oral <User Schedule>  metroNIDAZOLE  IVPB 500 milliGRAM(s) IV Intermittent every 8 hours  nystatin Powder 1 Application(s) Topical every 8 hours  oxyCODONE    Solution 2.5 milliGRAM(s) Oral every 6 hours PRN  oxyCODONE    Solution 5 milliGRAM(s) Oral every 6 hours PRN  oxyCODONE    Solution 10 milliGRAM(s) Oral every 6 hours PRN  pantoprazole  Injectable 40 milliGRAM(s) IV Push every 12 hours  predniSONE  Solution 5 milliGRAM(s) Enteral Tube <User Schedule>  QUEtiapine 12.5 milliGRAM(s) Oral <User Schedule>  sodium chloride 1 Gram(s) Oral every 8 hours  sodium chloride 0.65% Nasal 1 Spray(s) Both Nostrils every 6 hours  vancomycin    Solution 125 milliGRAM(s) Oral every 6 hours  witch hazel Pads 1 Application(s) Topical every 12 hours      dextrose 50% Injectable 25 Gram(s) IV Push once  dextrose 50% Injectable 25 Gram(s) IV Push once  dextrose 50% Injectable 12.5 Gram(s) IV Push once  glucagon  Injectable 1 milliGRAM(s) IntraMuscular once  glucagon  Injectable 1 milliGRAM(s) IntraMuscular once  insulin lispro (ADMELOG) corrective regimen sliding scale   SubCutaneous every 6 hours  insulin lispro Injectable (ADMELOG) 36 Unit(s) SubCutaneous <User Schedule>  levothyroxine 125 MICROGram(s) Oral <User Schedule>  predniSONE  Solution 5 milliGRAM(s) Enteral Tube <User Schedule>      insulin lispro (ADMELOG) corrective regimen sliding scale   SubCutaneous every 6 hours  insulin lispro Injectable (ADMELOG) 36 Unit(s) SubCutaneous <User Schedule>      PHYSICAL EXAM:  VITALS:   T(C): 36.7 (11-07-24 @ 04:31), Max: 38.8 (11-06-24 @ 12:39)  HR: 92 (11-07-24 @ 11:43) (70 - 117)  BP: 132/61 (11-07-24 @ 04:31) (102/52 - 140/58)  RR: 18 (11-07-24 @ 04:31) (18 - 19)  SpO2: 100% (11-07-24 @ 11:43) (100% - 100%)    GENERAL: In no acute distress  Respiratory: Respirations unlabored, remains on vent via trach  Extremities: Warm and dry, no edema  NEURO: Alert and appropriate     LABS:  POCT Blood Glucose.: 89 mg/dL (11-07-24 @ 12:02)  POCT Blood Glucose.: 89 mg/dL (11-07-24 @ 05:59)  POCT Blood Glucose.: 178 mg/dL (11-06-24 @ 23:57)  POCT Blood Glucose.: 248 mg/dL (11-06-24 @ 18:02)  POCT Blood Glucose.: 165 mg/dL (11-06-24 @ 12:31)  POCT Blood Glucose.: 129 mg/dL (11-06-24 @ 05:37)  POCT Blood Glucose.: 219 mg/dL (11-06-24 @ 00:52)  POCT Blood Glucose.: 283 mg/dL (11-05-24 @ 23:38)  POCT Blood Glucose.: 309 mg/dL (11-05-24 @ 17:46)  POCT Blood Glucose.: 276 mg/dL (11-05-24 @ 11:59)  POCT Blood Glucose.: 223 mg/dL (11-05-24 @ 08:11)  POCT Blood Glucose.: 133 mg/dL (11-05-24 @ 06:00)  POCT Blood Glucose.: 142 mg/dL (11-05-24 @ 00:26)  POCT Blood Glucose.: 280 mg/dL (11-04-24 @ 17:38)                          7.6    12.94 )-----------( 174      ( 07 Nov 2024 06:49 )             25.8     11-07    130[L]  |  101  |  18  ----------------------------<  93  4.3   |  19[L]  |  <0.30[L]    Ca    7.8[L]      07 Nov 2024 06:48  Phos  3.3     11-07  Mg     1.9     11-07    TPro  5.8[L]  /  Alb  2.1[L]  /  TBili  0.4  /  DBili  x   /  AST  25  /  ALT  18  /  AlkPhos  119  11-07    LIVER FUNCTIONS - ( 07 Nov 2024 06:48 )  Alb: 2.1 g/dL / Pro: 5.8 g/dL / ALK PHOS: 119 U/L / ALT: 18 U/L / AST: 25 U/L / GGT: x                 Urinalysis Basic - ( 07 Nov 2024 06:48 )    Color: x / Appearance: x / SG: x / pH: x  Gluc: 93 mg/dL / Ketone: x  / Bili: x / Urobili: x   Blood: x / Protein: x / Nitrite: x   Leuk Esterase: x / RBC: x / WBC x   Sq Epi: x / Non Sq Epi: x / Bacteria: x        Culture - Sputum (collected 04 Nov 2024 17:37)  Source: .Sputum Sputum  Gram Stain (05 Nov 2024 07:16):    Rare polymorphonuclear leukocytes per low power field    No Squamous epithelial cells per low power field    Moderate Gram Negative Rods seen per oil power field    Few Gram Positive Rods seen per oil power field  Final Report (06 Nov 2024 19:28):    Moderate Mixed gram negative rods including    Moderate Pseudomonas aeruginosa    Commensal vinay consistent with body site  Organism: Pseudomonas aeruginosa (06 Nov 2024 19:28)  Organism: Pseudomonas aeruginosa (06 Nov 2024 19:28)        A1C with Estimated Average Glucose Result: A1C with Estimated Average Glucose Result: 5.8 % (10-14-24 @ 05:08)  A1C with Estimated Average Glucose Result: 6.4 % (08-12-24 @ 07:32)

## 2024-11-07 NOTE — PROGRESS NOTE ADULT - PROBLEM SELECTOR PLAN 2
[] PNA  - CXR 10/7: Opacification of the right middle lobe may represent pneumonia versus atelectasis  - sputum culture 10/8 PSA  - completed course of cefepime on 10/12  - CT A/P 10/18: ?multifocal pneumonia, presence of gastrocutaneous fistula; otherwise no collections/abscess noted  - Txd with meropenem 10/15-10/23; d/c'd for persistent diarrhea    [] C.Diff / Fevers  - cdiff + 11/1   - Febrile 102.2 Today   - Bld cx 11/1: MR Tristan Epi, Repeat Bld cx 11/3: NGTD  - Urine cx 11/3: NGTD, Sputum cx 11/4: ( Sent/ Results pending )  - F/u Repeat CXR   - Cont po Vanco / Flagyl  - ID Continues to follow  - will give 3 days on cefepime, ID to determine if will continue

## 2024-11-07 NOTE — PROGRESS NOTE ADULT - NS ATTEND AMEND GEN_ALL_CORE FT
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 Found to have Pseudomonas aeruginosa in sputum culture and urine culture with ESBL E. coli s/p course of meropenem. Course complicated by antibiotic-associated diarrhea.          - C/W current pain regiment, well controlled. C/W escitalopram and seroquel,  add tylenol for fever control  - pt remains lethargic  but episodically back to baseline, likely septic encephalopathy  - CTH w/o acute changes  - continue lung protective ventilation. Pressure support trials as tolerates. Does poorly on PS trials. Continue Duonebs.   - cont tx for cdiff, f/u ID reccs (prior cx's w/ PsA sputum and esbl ecoli uti)  - poss PNA on CT, cefepime for now, f/u ID reccs  - monitor BM frequency, volume improved past 24h  - Anemia multifactorial, hemorrhoids, AOCD. s/p EGD without active bleeding, f/u Heme reccs  - cont to monitor FS    -  RA, on home prednisone 5 mg daily   - monitor hypoNa, tsh normal   - cont wound care to sacrum for decub (also likely moisture related rash areas)   - CT w/ poss osteo changes likely from previous infection, f/u ID reccs  - DVT ppx- only scd for now given significant anemia a few days ago    -Dispo: full code, prognosis guarded, discussed with patient and daughter Marysol

## 2024-11-07 NOTE — PROGRESS NOTE ADULT - SUBJECTIVE AND OBJECTIVE BOX
Patient is a 74y old  Female who presents with a chief complaint of Patient admitted with Hypoxemia and episode of Bright red blood per rectum (06 Nov 2024 18:08)    HPI:  73 yo F PMH T2DM on insulin, HTN, HLD, hypothyroidism, RA on Prednisone, Fibromyalgia, cerebral aneurysm s/p repair, chronic hypercapnic respiratory failure s/p trach (vent dependent) and chronic PEG 2022, recurrent C. diff infection (follows with Dr. Newman), persistent GJ tube leaks now s/p GC fistula repair 8/12 BIBEMS with daughter for respiratory distress, hypoxia to high 80s.  Pt also noted to have rectal bleeding this past week requiring transfusion 5 days ago in ED as per daughter.  In ED, pt afebrile, fiO2 96-98% on 40% on ventilator.  Chest Xray w/ opacification of right lung concerning for possible PNA.  Admitted to RCU for further management.  (07 Oct 2024 18:58)    Interval Events:    REVIEW OF SYSTEMS:  [ ] Positive  [ ] All other systems negative  [ ] Unable to assess ROS because ________    Vital Signs Last 24 Hrs  T(C): 36.7 (11-07-24 @ 04:31), Max: 38.8 (11-06-24 @ 12:39)  T(F): 98 (11-07-24 @ 04:31), Max: 101.8 (11-06-24 @ 12:39)  HR: 72 (11-07-24 @ 05:56) (70 - 117)  BP: 132/61 (11-07-24 @ 04:31) (102/52 - 140/58)  RR: 18 (11-07-24 @ 04:31) (18 - 19)  SpO2: 100% (11-07-24 @ 05:56) (100% - 100%)    PHYSICAL EXAM:  HEENT:   [ ]Tracheostomy:  [ ]Pupils equal  [ ]No oral lesions  [ ]Abnormal    SKIN  [ ] No Rash  [ ] Abnormal  [ ] pressure    CARDIAC  [ ]Regular  [ ]Abnormal    PULMONARY  [ ]Bilateral Clear Breath Sounds  [ ]Normal Excursion  [ ]Abnormal    GI  [ ]PEG      [ ] +BS		              [ ]Soft, nondistended, nontender	  [ ]Abnormal    MUSCULOSKELETAL                                   [ ]Bedbound                 [ ]Abnormal    [ ]Ambulatory/OOB to chair                           EXTREMITIES                                         [ ]Normal  [ ]Edema                           NEUROLOGIC  [ ] Normal, non focal  [ ] Focal findings:    PSYCHIATRIC  [ ]Alert and appropriate  [ ] Sedated	 [ ]Agitated    :  Mcbride: [ ] Yes, if yes: Date of Placement:                   [  ] No    LINES: Central Lines [ ] Yes, if yes: Date of Placement                                     [  ] No    HOSPITAL MEDICATIONS:  MEDICATIONS  (STANDING):  albuterol/ipratropium for Nebulization 3 milliLiter(s) Nebulizer every 6 hours  AQUAPHOR (petrolatum Ointment) 1 Application(s) Topical once  artificial  tears Solution 1 Drop(s) Both EYES every 6 hours  ascorbic acid 500 milliGRAM(s) Oral daily  Biotene Dry Mouth Oral Rinse 5 milliLiter(s) Swish and Spit every 6 hours  calcium carbonate 1250 mG  + Vitamin D (OsCal 500 + D) 1 Tablet(s) Oral <User Schedule>  cefepime   IVPB 2000 milliGRAM(s) IV Intermittent every 8 hours  chlorhexidine 0.12% Liquid 15 milliLiter(s) Oral Mucosa every 12 hours  chlorhexidine 4% Liquid 1 Application(s) Topical daily  dextrose 5%. 1000 milliLiter(s) (50 mL/Hr) IV Continuous <Continuous>  dextrose 5%. 1000 milliLiter(s) (50 mL/Hr) IV Continuous <Continuous>  dextrose 5%. 1000 milliLiter(s) (100 mL/Hr) IV Continuous <Continuous>  dextrose 5%. 1000 milliLiter(s) (100 mL/Hr) IV Continuous <Continuous>  dextrose 50% Injectable 12.5 Gram(s) IV Push once  dextrose 50% Injectable 25 Gram(s) IV Push once  dextrose 50% Injectable 25 Gram(s) IV Push once  diclofenac sodium 1% Gel 2 Gram(s) Topical every 6 hours  escitalopram 10 milliGRAM(s) Oral <User Schedule>  fentaNYL   Patch  12 MICROgram(s)/Hr 1 Patch Transdermal every 72 hours  fentaNYL   Patch  25 MICROgram(s)/Hr 1 Patch Transdermal every 72 hours  gabapentin Solution 250 milliGRAM(s) Oral <User Schedule>  glucagon  Injectable 1 milliGRAM(s) IntraMuscular once  glucagon  Injectable 1 milliGRAM(s) IntraMuscular once  hemorrhoidal Ointment 1 Application(s) Rectal at bedtime  influenza  Vaccine (HIGH DOSE) 0.5 milliLiter(s) IntraMuscular once  insulin glargine Injectable (LANTUS) 14 Unit(s) SubCutaneous <User Schedule>  insulin lispro (ADMELOG) corrective regimen sliding scale   SubCutaneous every 6 hours  insulin lispro Injectable (ADMELOG) 24 Unit(s) SubCutaneous <User Schedule>  insulin lispro Injectable (ADMELOG) 36 Unit(s) SubCutaneous <User Schedule>  lactobacillus acidophilus 1 Tablet(s) Oral <User Schedule>  levothyroxine 125 MICROGram(s) Oral <User Schedule>  lidocaine   4% Patch 4 Patch Transdermal every 24 hours  melatonin 12 milliGRAM(s) Oral <User Schedule>  metroNIDAZOLE  IVPB 500 milliGRAM(s) IV Intermittent every 8 hours  nystatin Powder 1 Application(s) Topical every 8 hours  pantoprazole  Injectable 40 milliGRAM(s) IV Push every 12 hours  predniSONE  Solution 5 milliGRAM(s) Enteral Tube <User Schedule>  QUEtiapine 12.5 milliGRAM(s) Oral <User Schedule>  sodium chloride 1 Gram(s) Oral every 8 hours  sodium chloride 0.65% Nasal 1 Spray(s) Both Nostrils every 6 hours  vancomycin    Solution 125 milliGRAM(s) Oral every 6 hours  witch hazel Pads 1 Application(s) Topical every 12 hours    MEDICATIONS  (PRN):  acetaminophen   Oral Liquid .. 650 milliGRAM(s) Oral every 6 hours PRN Temp greater or equal to 38C (100.4F), Mild Pain (1 - 3)  aluminum hydroxide/magnesium hydroxide/simethicone Suspension 30 milliLiter(s) Enteral Tube every 4 hours PRN Dyspepsia  oxyCODONE    Solution 10 milliGRAM(s) Oral every 6 hours PRN Severe Pain (7 - 10)  oxyCODONE    Solution 2.5 milliGRAM(s) Oral every 6 hours PRN Mild Pain (1 - 3)  oxyCODONE    Solution 5 milliGRAM(s) Oral every 6 hours PRN Moderate Pain (4 - 6)      LABS:                        7.6    12.94 )-----------( 174      ( 07 Nov 2024 06:49 )             25.8     11-07    130[L]  |  101  |  18  ----------------------------<  93  4.3   |  19[L]  |  <0.30[L]    Ca    7.8[L]      07 Nov 2024 06:48  Phos  3.3     11-07  Mg     1.9     11-07    TPro  5.8[L]  /  Alb  2.1[L]  /  TBili  0.4  /  DBili  x   /  AST  25  /  ALT  18  /  AlkPhos  119  11-07      Arterial Blood Gas:  11-06 @ 14:05  7.37/32/209/18/100.0/-6.1  ABG lactate: --  Arterial Blood Gas:  11-05 @ 13:56  7.35/33/129/18/100.0/-6.7  ABG lactate: --      Mode: AC/ CMV (Assist Control/ Continuous Mandatory Ventilation)  RR (machine): 18  TV (machine): 350  FiO2: 30  PEEP: 5  ITime: 1  MAP: 10  PIP: 31   Patient is a 74y old  Female who presents with a chief complaint of Patient admitted with Hypoxemia and episode of Bright red blood per rectum (06 Nov 2024 18:08)    HPI:  73 yo F PMH T2DM on insulin, HTN, HLD, hypothyroidism, RA on Prednisone, Fibromyalgia, cerebral aneurysm s/p repair, chronic hypercapnic respiratory failure s/p trach (vent dependent) and chronic PEG 2022, recurrent C. diff infection (follows with Dr. Newman), persistent GJ tube leaks now s/p GC fistula repair 8/12 BIBEMS with daughter for respiratory distress, hypoxia to high 80s.  Pt also noted to have rectal bleeding this past week requiring transfusion 5 days ago in ED as per daughter.  In ED, pt afebrile, fiO2 96-98% on 40% on ventilator.  Chest Xray w/ opacification of right lung concerning for possible PNA.  Admitted to RCU for further management.  (07 Oct 2024 18:58)    Interval Events: No issues overnight    REVIEW OF SYSTEMS:  [ ] Positive  [ ] All other systems negative  [x ] Unable to assess ROS because of encephalopathyx    Vital Signs Last 24 Hrs  T(C): 36.7 (11-07-24 @ 04:31), Max: 38.8 (11-06-24 @ 12:39)  T(F): 98 (11-07-24 @ 04:31), Max: 101.8 (11-06-24 @ 12:39)  HR: 72 (11-07-24 @ 05:56) (70 - 117)  BP: 132/61 (11-07-24 @ 04:31) (102/52 - 140/58)  RR: 18 (11-07-24 @ 04:31) (18 - 19)  SpO2: 100% (11-07-24 @ 05:56) (100% - 100%)    PHYSICAL EXAM:  HEENT:   [X]Tracheostomy: #8 XLT cuffed shiley   [X]Pupils equal  [ ]No oral lesions  [ ]Abnormal    SKIN  [ ]No Rash  [ ] Abnormal  [X] pressure - sacral unstageable    CARDIAC  [X]Regular  [ ]Abnormal    PULMONARY  [ ]Bilateral Clear Breath Sounds  [ ]Normal Excursion  [X]Abnormal - mild coarse BS     GI  [X]PEG      [X] +BS		              [X]Soft, nondistended, nontender	  [X]Abnormal - old PEG site c/d/i, + rectal tube     MUSCULOSKELETAL                                   [X]Bedbound                 [ ]Abnormal    [ ]Ambulatory/OOB to chair                           EXTREMITIES                                         [ ]Normal  [X]Edema - mild generalized pitting edema                          NEUROLOGIC  [ ] Normal, non focal  [X] Focal findings: lethargic but following commands    PSYCHIATRIC  [x] Easily arousable, lethargic  [ ] Sedated	 [ ]Agitated    :  Mcbride: [ ] Yes, if yes: Date of Placement:                   [X] No    LINES: Central Lines [ ] Yes, if yes: Date of Placement                                     [X] No      HOSPITAL MEDICATIONS:  MEDICATIONS  (STANDING):  albuterol/ipratropium for Nebulization 3 milliLiter(s) Nebulizer every 6 hours  AQUAPHOR (petrolatum Ointment) 1 Application(s) Topical once  artificial  tears Solution 1 Drop(s) Both EYES every 6 hours  ascorbic acid 500 milliGRAM(s) Oral daily  Biotene Dry Mouth Oral Rinse 5 milliLiter(s) Swish and Spit every 6 hours  calcium carbonate 1250 mG  + Vitamin D (OsCal 500 + D) 1 Tablet(s) Oral <User Schedule>  cefepime   IVPB 2000 milliGRAM(s) IV Intermittent every 8 hours  chlorhexidine 0.12% Liquid 15 milliLiter(s) Oral Mucosa every 12 hours  chlorhexidine 4% Liquid 1 Application(s) Topical daily  dextrose 5%. 1000 milliLiter(s) (50 mL/Hr) IV Continuous <Continuous>  dextrose 5%. 1000 milliLiter(s) (50 mL/Hr) IV Continuous <Continuous>  dextrose 5%. 1000 milliLiter(s) (100 mL/Hr) IV Continuous <Continuous>  dextrose 5%. 1000 milliLiter(s) (100 mL/Hr) IV Continuous <Continuous>  dextrose 50% Injectable 12.5 Gram(s) IV Push once  dextrose 50% Injectable 25 Gram(s) IV Push once  dextrose 50% Injectable 25 Gram(s) IV Push once  diclofenac sodium 1% Gel 2 Gram(s) Topical every 6 hours  escitalopram 10 milliGRAM(s) Oral <User Schedule>  fentaNYL   Patch  12 MICROgram(s)/Hr 1 Patch Transdermal every 72 hours  fentaNYL   Patch  25 MICROgram(s)/Hr 1 Patch Transdermal every 72 hours  gabapentin Solution 250 milliGRAM(s) Oral <User Schedule>  glucagon  Injectable 1 milliGRAM(s) IntraMuscular once  glucagon  Injectable 1 milliGRAM(s) IntraMuscular once  hemorrhoidal Ointment 1 Application(s) Rectal at bedtime  influenza  Vaccine (HIGH DOSE) 0.5 milliLiter(s) IntraMuscular once  insulin glargine Injectable (LANTUS) 14 Unit(s) SubCutaneous <User Schedule>  insulin lispro (ADMELOG) corrective regimen sliding scale   SubCutaneous every 6 hours  insulin lispro Injectable (ADMELOG) 24 Unit(s) SubCutaneous <User Schedule>  insulin lispro Injectable (ADMELOG) 36 Unit(s) SubCutaneous <User Schedule>  lactobacillus acidophilus 1 Tablet(s) Oral <User Schedule>  levothyroxine 125 MICROGram(s) Oral <User Schedule>  lidocaine   4% Patch 4 Patch Transdermal every 24 hours  melatonin 12 milliGRAM(s) Oral <User Schedule>  metroNIDAZOLE  IVPB 500 milliGRAM(s) IV Intermittent every 8 hours  nystatin Powder 1 Application(s) Topical every 8 hours  pantoprazole  Injectable 40 milliGRAM(s) IV Push every 12 hours  predniSONE  Solution 5 milliGRAM(s) Enteral Tube <User Schedule>  QUEtiapine 12.5 milliGRAM(s) Oral <User Schedule>  sodium chloride 1 Gram(s) Oral every 8 hours  sodium chloride 0.65% Nasal 1 Spray(s) Both Nostrils every 6 hours  vancomycin    Solution 125 milliGRAM(s) Oral every 6 hours  witch hazel Pads 1 Application(s) Topical every 12 hours    MEDICATIONS  (PRN):  acetaminophen   Oral Liquid .. 650 milliGRAM(s) Oral every 6 hours PRN Temp greater or equal to 38C (100.4F), Mild Pain (1 - 3)  aluminum hydroxide/magnesium hydroxide/simethicone Suspension 30 milliLiter(s) Enteral Tube every 4 hours PRN Dyspepsia  oxyCODONE    Solution 10 milliGRAM(s) Oral every 6 hours PRN Severe Pain (7 - 10)  oxyCODONE    Solution 2.5 milliGRAM(s) Oral every 6 hours PRN Mild Pain (1 - 3)  oxyCODONE    Solution 5 milliGRAM(s) Oral every 6 hours PRN Moderate Pain (4 - 6)      LABS:                        7.6    12.94 )-----------( 174      ( 07 Nov 2024 06:49 )             25.8     11-07    130[L]  |  101  |  18  ----------------------------<  93  4.3   |  19[L]  |  <0.30[L]    Ca    7.8[L]      07 Nov 2024 06:48  Phos  3.3     11-07  Mg     1.9     11-07    TPro  5.8[L]  /  Alb  2.1[L]  /  TBili  0.4  /  DBili  x   /  AST  25  /  ALT  18  /  AlkPhos  119  11-07      Arterial Blood Gas:  11-06 @ 14:05  7.37/32/209/18/100.0/-6.1  ABG lactate: --  Arterial Blood Gas:  11-05 @ 13:56  7.35/33/129/18/100.0/-6.7  ABG lactate: --      Mode: AC/ CMV (Assist Control/ Continuous Mandatory Ventilation)  RR (machine): 18  TV (machine): 350  FiO2: 30  PEEP: 5  ITime: 1  MAP: 10  PIP: 31

## 2024-11-07 NOTE — PROGRESS NOTE ADULT - PROBLEM SELECTOR PLAN 1
- Patient with Chronic Trach at baseline   - mechanical vent: volume ctrl 18/350/5/40%  - CXR 10/7: Opacification of the right middle lobe may represent pneumonia versus atelectasis  - received Rocephin and Azithro in the ED   - sputum cx 10/8: PSA  - completed course of Cefepime ended on 10/12  - ID continues to follow - Patient with Chronic Trach at baseline   - mechanical vent: volume ctrl 18/350/5/40%  - CXR 10/7: Opacification of the right middle lobe may represent pneumonia versus atelectasis  - received Rocephin and Azithro in the ED   - sputum cx 10/8: PSA  - completed course of Cefepime ended on 10/12  - ID continues to follow  - Cefepime restarted 11/6-->11/11

## 2024-11-07 NOTE — PROGRESS NOTE ADULT - PROBLEM SELECTOR PLAN 1
Inpatient Plan:  - Please monitor blood glucose values q 6 hours while on tube feeding or NPO  - Adjust Lantus to 12u @0600  - Adjust Admelog to 22u @0600 and 38u @1200 (if BG <100s administer 9u @0600 or 19u @1200; Hold if TFs are on hold)  - Start adjust low dose Admelog correctional scale q6h while on TFs and overnight    Discharge Plan:  - TBD depending on insulin requirement and discharge tube feeding regimen (Bolus vs continuous tube feeding)   - If bolus tube feeding > Lantus plus Humalog   - Patient should have BGs checked 4x a day while on TFs.    Daughter aware to contact Endocrinologist if BG <70mg/dL x1 or >200mg/dL consistently or >400mg/dL x1.   - Followup with Dr Ruth: Endocrinology [facility]: [address] [place] [address]. [address]. [place] [address]. Tel: [phone]- Telemedicine- daughter to schedule.   - Make sure pt has Rx for all DM supplies and insulin

## 2024-11-07 NOTE — PROGRESS NOTE ADULT - ASSESSMENT
75 yo F w/h/o controlled T2DM (A1C 5.8%) on insulin at home. Also HTN, HLD, hypothyroidism, RA on Prednisone, Fibromyalgia, cerebral aneurysm s/p repair, chronic hypercapnic respiratory failure s/p trach (vent dependent) and chronic PEG 2022, recurrent C. diff infection (follows with Dr. 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 S/p antibiotics and s/p GJ tube exchanges on 10/14, s/p PEG replacement 10/21. Endocrine consulted for Type 2 DM management while on tube feeding. She continues on tube feeding ([employer] Peptide 1.5 ([id number]) at 80 ml/hr)  for 12 hours, goal rate and tolerating but positive for diarrhea per staff. Patient no transitioned to FWF 50cc q1h. BG 89mg/dL fasting this am and also at 1200. Noted TFs held for a good amount of time this am given patient needed to be turned/cleaned up and adjusting new TF order with flushes. Will order reduced dose of Admelog to 1200 today given TFs off this am. Will adjust Lantus QAM to prevent hypoglycemia with fasting BG. Will also modify sliding scale to start with 1u for BG >200mg/dL given patient received 2u overnight for BG 178mg/dL and then FBG was 89mg/dL. Will adjust Admelog for 1200 to start tmrw, as long as TFs continued and stopped for >1hr. Endocrine will closely monitor BG and adjust insulin as needed for BG goal 100-180mg/dL inpatient.          Home DM medications: Lantus 35 units at HS, Humalog 26 units with # 1 feeding, 22 units with #2 tube feeding, 20 units with #3 tube feeding and  Humalog correction scale (  2 units for -503, 4 units for -250, 6 units for -300, 8units for -350, 10 units for -400, 12 units for -450)   while on [employer] formula 3 cans/day, slow bolus( run at 80 ml/hr total volume 960 ml/day> 1st can around 6:30-8:30, 2nd can around 3 pm, 3rd can around 8-9 pm) plus Banatrol 1/2 packet BID, was increasing to 1 packet BID, plus Kefir 10 oz/day ( divided in multiple times throughout the day).

## 2024-11-08 NOTE — PROGRESS NOTE ADULT - SUBJECTIVE AND OBJECTIVE BOX
Patient is a 74y old  Female who presents with a chief complaint of Patient admitted with Hypoxemia and episode of Bright red blood per rectum (07 Nov 2024 18:20)      Interval Events: No events reported overnight     REVIEW OF SYSTEMS:  [ ] Positive  [ ] All other systems negative  [ ] Unable to assess ROS because ________    Vital Signs Last 24 Hrs  T(C): 37.1 (11-08-24 @ 04:49), Max: 37.6 (11-07-24 @ 17:14)  T(F): 98.7 (11-08-24 @ 04:49), Max: 99.7 (11-07-24 @ 17:14)  HR: 89 (11-08-24 @ 05:55) (89 - 113)  BP: 125/60 (11-08-24 @ 04:49) (115/51 - 144/99)  RR: 18 (11-08-24 @ 04:49) (17 - 18)  SpO2: 100% (11-08-24 @ 05:55) (98% - 100%)    PHYSICAL EXAM:  HEENT:   [ ]Tracheostomy:  [ ]Pupils equal  [ ]No oral lesions  [ ]Abnormal    SKIN  [ ]No Rash  [ ] Abnormal  [ ] pressure    CARDIAC  [ ]Regular  [ ]Abnormal    PULMONARY  [ ]Bilateral Clear Breath Sounds  [ ]Normal Excursion  [ ]Abnormal    GI  [ ]PEG      [ ] +BS		              [ ]Soft, nondistended, nontender	  [ ]Abnormal    MUSCULOSKELETAL                                   [ ]Bedbound                 [ ]Abnormal    [ ]Ambulatory/OOB to chair                           EXTREMITIES                                         [ ]Normal  [ ]Edema                           NEUROLOGIC  [ ] Normal, non focal  [ ] Focal findings:    PSYCHIATRIC  [ ]Alert and appropriate  [ ] Sedated	 [ ]Agitated    :  Mcbride: [ ] Yes, if yes: Date of Placement:                   [  ] No    LINES: Central Lines [ ] Yes, if yes: Date of Placement                                     [  ] No    HOSPITAL MEDICATIONS:  MEDICATIONS  (STANDING):  albuterol/ipratropium for Nebulization 3 milliLiter(s) Nebulizer every 6 hours  AQUAPHOR (petrolatum Ointment) 1 Application(s) Topical once  artificial  tears Solution 1 Drop(s) Both EYES every 6 hours  ascorbic acid 500 milliGRAM(s) Oral daily  Biotene Dry Mouth Oral Rinse 5 milliLiter(s) Swish and Spit every 6 hours  calcium carbonate 1250 mG  + Vitamin D (OsCal 500 + D) 1 Tablet(s) Oral <User Schedule>  cefepime   IVPB 2000 milliGRAM(s) IV Intermittent every 8 hours  chlorhexidine 0.12% Liquid 15 milliLiter(s) Oral Mucosa every 12 hours  chlorhexidine 4% Liquid 1 Application(s) Topical daily  dextrose 5%. 1000 milliLiter(s) (100 mL/Hr) IV Continuous <Continuous>  dextrose 5%. 1000 milliLiter(s) (50 mL/Hr) IV Continuous <Continuous>  dextrose 5%. 1000 milliLiter(s) (100 mL/Hr) IV Continuous <Continuous>  dextrose 5%. 1000 milliLiter(s) (50 mL/Hr) IV Continuous <Continuous>  dextrose 50% Injectable 25 Gram(s) IV Push once  dextrose 50% Injectable 25 Gram(s) IV Push once  dextrose 50% Injectable 12.5 Gram(s) IV Push once  diclofenac sodium 1% Gel 2 Gram(s) Topical every 6 hours  escitalopram 10 milliGRAM(s) Oral <User Schedule>  fentaNYL   Patch  12 MICROgram(s)/Hr 1 Patch Transdermal every 72 hours  fentaNYL   Patch  25 MICROgram(s)/Hr 1 Patch Transdermal every 72 hours  gabapentin Solution 250 milliGRAM(s) Oral <User Schedule>  glucagon  Injectable 1 milliGRAM(s) IntraMuscular once  glucagon  Injectable 1 milliGRAM(s) IntraMuscular once  hemorrhoidal Ointment 1 Application(s) Rectal at bedtime  influenza  Vaccine (HIGH DOSE) 0.5 milliLiter(s) IntraMuscular once  insulin glargine Injectable (LANTUS) 12 Unit(s) SubCutaneous <User Schedule>  insulin lispro (ADMELOG) corrective regimen sliding scale   SubCutaneous every 6 hours  insulin lispro Injectable (ADMELOG) 38 Unit(s) SubCutaneous <User Schedule>  insulin lispro Injectable (ADMELOG) 22 Unit(s) SubCutaneous <User Schedule>  lactobacillus acidophilus 1 Tablet(s) Oral <User Schedule>  levothyroxine 125 MICROGram(s) Oral <User Schedule>  lidocaine   4% Patch 4 Patch Transdermal every 24 hours  melatonin 12 milliGRAM(s) Oral <User Schedule>  metroNIDAZOLE  IVPB 500 milliGRAM(s) IV Intermittent every 8 hours  nystatin Powder 1 Application(s) Topical every 8 hours  pantoprazole  Injectable 40 milliGRAM(s) IV Push every 12 hours  predniSONE  Solution 5 milliGRAM(s) Enteral Tube <User Schedule>  QUEtiapine 12.5 milliGRAM(s) Oral <User Schedule>  sodium chloride 1 Gram(s) Oral every 8 hours  sodium chloride 0.65% Nasal 1 Spray(s) Both Nostrils every 6 hours  vancomycin    Solution 125 milliGRAM(s) Oral every 6 hours  witch hazel Pads 1 Application(s) Topical every 12 hours    MEDICATIONS  (PRN):  acetaminophen   Oral Liquid .. 650 milliGRAM(s) Oral every 6 hours PRN Temp greater or equal to 38C (100.4F), Mild Pain (1 - 3)  aluminum hydroxide/magnesium hydroxide/simethicone Suspension 30 milliLiter(s) Enteral Tube every 4 hours PRN Dyspepsia  oxyCODONE    Solution 10 milliGRAM(s) Oral every 6 hours PRN Severe Pain (7 - 10)  oxyCODONE    Solution 2.5 milliGRAM(s) Oral every 6 hours PRN Mild Pain (1 - 3)  oxyCODONE    Solution 5 milliGRAM(s) Oral every 6 hours PRN Moderate Pain (4 - 6)      LABS:                        7.1    10.73 )-----------( 176      ( 08 Nov 2024 06:51 )             23.9     11-07    130[L]  |  101  |  18  ----------------------------<  93  4.3   |  19[L]  |  <0.30[L]    Ca    7.8[L]      07 Nov 2024 06:48  Phos  3.3     11-07  Mg     1.9     11-07    TPro  5.8[L]  /  Alb  2.1[L]  /  TBili  0.4  /  DBili  x   /  AST  25  /  ALT  18  /  AlkPhos  119  11-07      Urinalysis Basic - ( 07 Nov 2024 06:48 )    Color: x / Appearance: x / SG: x / pH: x  Gluc: 93 mg/dL / Ketone: x  / Bili: x / Urobili: x   Blood: x / Protein: x / Nitrite: x   Leuk Esterase: x / RBC: x / WBC x   Sq Epi: x / Non Sq Epi: x / Bacteria: x      Arterial Blood Gas:  11-06 @ 14:05  7.37/32/209/18/100.0/-6.1  ABG lactate: --      CAPILLARY BLOOD GLUCOSE    MICROBIOLOGY:     RADIOLOGY:  [ ] Reviewed and interpreted by me    Mode: AC/ CMV (Assist Control/ Continuous Mandatory Ventilation)  RR (machine): 18  TV (machine): 350  FiO2: 30  PEEP: 5  ITime: 1  MAP: 10  PIP: 28   Patient is a 74y old  Female who presents with a chief complaint of Patient admitted with Hypoxemia and episode of Bright red blood per rectum (07 Nov 2024 18:20)      Interval Events: No events reported overnight     REVIEW OF SYSTEMS:  [ ] Positive  [ ] All other systems negative  [x] Unable to assess ROS because not answering verbal questioning     Vital Signs Last 24 Hrs  T(C): 37.1 (11-08-24 @ 04:49), Max: 37.6 (11-07-24 @ 17:14)  T(F): 98.7 (11-08-24 @ 04:49), Max: 99.7 (11-07-24 @ 17:14)  HR: 89 (11-08-24 @ 05:55) (89 - 113)  BP: 125/60 (11-08-24 @ 04:49) (115/51 - 144/99)  RR: 18 (11-08-24 @ 04:49) (17 - 18)  SpO2: 100% (11-08-24 @ 05:55) (98% - 100%)    PHYSICAL EXAM:  HEENT:   [X]Tracheostomy: #8 XLT cuffed shiley   [X]Pupils equal  [ ]No oral lesions  [ ]Abnormal    SKIN  [ ]No Rash  [ ] Abnormal  [X] pressure - sacral unstageable    CARDIAC  [X]Regular  [ ]Abnormal    PULMONARY  [ ]Bilateral Clear Breath Sounds  [ ]Normal Excursion  [X]Abnormal - mild coarse BS     GI  [X]PEG      [X] +BS		              [X]Soft, nondistended, nontender	  [X]Abnormal - old PEG site c/d/i, + rectal tube     MUSCULOSKELETAL                                   [X]Bedbound                 [ ]Abnormal    [ ]Ambulatory/OOB to chair                           EXTREMITIES                                         [ ]Normal  [X]Edema - mild generalized pitting edema                          NEUROLOGIC  [ ] Normal, non focal  [X] Focal findings: lethargic but following commands    PSYCHIATRIC  [x] Easily arousable, lethargic  [ ] Sedated	 [ ]Agitated    :  Mcbride: [ ] Yes, if yes: Date of Placement:                   [X] No    LINES: Central Lines [ ] Yes, if yes: Date of Placement                                     [X] No    HOSPITAL MEDICATIONS:  MEDICATIONS  (STANDING):  albuterol/ipratropium for Nebulization 3 milliLiter(s) Nebulizer every 6 hours  AQUAPHOR (petrolatum Ointment) 1 Application(s) Topical once  artificial  tears Solution 1 Drop(s) Both EYES every 6 hours  ascorbic acid 500 milliGRAM(s) Oral daily  Biotene Dry Mouth Oral Rinse 5 milliLiter(s) Swish and Spit every 6 hours  calcium carbonate 1250 mG  + Vitamin D (OsCal 500 + D) 1 Tablet(s) Oral <User Schedule>  cefepime   IVPB 2000 milliGRAM(s) IV Intermittent every 8 hours  chlorhexidine 0.12% Liquid 15 milliLiter(s) Oral Mucosa every 12 hours  chlorhexidine 4% Liquid 1 Application(s) Topical daily  dextrose 5%. 1000 milliLiter(s) (100 mL/Hr) IV Continuous <Continuous>  dextrose 5%. 1000 milliLiter(s) (50 mL/Hr) IV Continuous <Continuous>  dextrose 5%. 1000 milliLiter(s) (100 mL/Hr) IV Continuous <Continuous>  dextrose 5%. 1000 milliLiter(s) (50 mL/Hr) IV Continuous <Continuous>  dextrose 50% Injectable 25 Gram(s) IV Push once  dextrose 50% Injectable 25 Gram(s) IV Push once  dextrose 50% Injectable 12.5 Gram(s) IV Push once  diclofenac sodium 1% Gel 2 Gram(s) Topical every 6 hours  escitalopram 10 milliGRAM(s) Oral <User Schedule>  fentaNYL   Patch  12 MICROgram(s)/Hr 1 Patch Transdermal every 72 hours  fentaNYL   Patch  25 MICROgram(s)/Hr 1 Patch Transdermal every 72 hours  gabapentin Solution 250 milliGRAM(s) Oral <User Schedule>  glucagon  Injectable 1 milliGRAM(s) IntraMuscular once  glucagon  Injectable 1 milliGRAM(s) IntraMuscular once  hemorrhoidal Ointment 1 Application(s) Rectal at bedtime  influenza  Vaccine (HIGH DOSE) 0.5 milliLiter(s) IntraMuscular once  insulin glargine Injectable (LANTUS) 12 Unit(s) SubCutaneous <User Schedule>  insulin lispro (ADMELOG) corrective regimen sliding scale   SubCutaneous every 6 hours  insulin lispro Injectable (ADMELOG) 38 Unit(s) SubCutaneous <User Schedule>  insulin lispro Injectable (ADMELOG) 22 Unit(s) SubCutaneous <User Schedule>  lactobacillus acidophilus 1 Tablet(s) Oral <User Schedule>  levothyroxine 125 MICROGram(s) Oral <User Schedule>  lidocaine   4% Patch 4 Patch Transdermal every 24 hours  melatonin 12 milliGRAM(s) Oral <User Schedule>  metroNIDAZOLE  IVPB 500 milliGRAM(s) IV Intermittent every 8 hours  nystatin Powder 1 Application(s) Topical every 8 hours  pantoprazole  Injectable 40 milliGRAM(s) IV Push every 12 hours  predniSONE  Solution 5 milliGRAM(s) Enteral Tube <User Schedule>  QUEtiapine 12.5 milliGRAM(s) Oral <User Schedule>  sodium chloride 1 Gram(s) Oral every 8 hours  sodium chloride 0.65% Nasal 1 Spray(s) Both Nostrils every 6 hours  vancomycin    Solution 125 milliGRAM(s) Oral every 6 hours  witch hazel Pads 1 Application(s) Topical every 12 hours    MEDICATIONS  (PRN):  acetaminophen   Oral Liquid .. 650 milliGRAM(s) Oral every 6 hours PRN Temp greater or equal to 38C (100.4F), Mild Pain (1 - 3)  aluminum hydroxide/magnesium hydroxide/simethicone Suspension 30 milliLiter(s) Enteral Tube every 4 hours PRN Dyspepsia  oxyCODONE    Solution 10 milliGRAM(s) Oral every 6 hours PRN Severe Pain (7 - 10)  oxyCODONE    Solution 2.5 milliGRAM(s) Oral every 6 hours PRN Mild Pain (1 - 3)  oxyCODONE    Solution 5 milliGRAM(s) Oral every 6 hours PRN Moderate Pain (4 - 6)      LABS:                        7.1    10.73 )-----------( 176      ( 08 Nov 2024 06:51 )             23.9     11-07    130[L]  |  101  |  18  ----------------------------<  93  4.3   |  19[L]  |  <0.30[L]    Ca    7.8[L]      07 Nov 2024 06:48  Phos  3.3     11-07  Mg     1.9     11-07    TPro  5.8[L]  /  Alb  2.1[L]  /  TBili  0.4  /  DBili  x   /  AST  25  /  ALT  18  /  AlkPhos  119  11-07      Urinalysis Basic - ( 07 Nov 2024 06:48 )    Color: x / Appearance: x / SG: x / pH: x  Gluc: 93 mg/dL / Ketone: x  / Bili: x / Urobili: x   Blood: x / Protein: x / Nitrite: x   Leuk Esterase: x / RBC: x / WBC x   Sq Epi: x / Non Sq Epi: x / Bacteria: x      Arterial Blood Gas:  11-06 @ 14:05  7.37/32/209/18/100.0/-6.1  ABG lactate: --      CAPILLARY BLOOD GLUCOSE    MICROBIOLOGY:     RADIOLOGY:  [ ] Reviewed and interpreted by me    Mode: AC/ CMV (Assist Control/ Continuous Mandatory Ventilation)  RR (machine): 18  TV (machine): 350  FiO2: 30  PEEP: 5  ITime: 1  MAP: 10  PIP: 28

## 2024-11-08 NOTE — PROGRESS NOTE ADULT - SUBJECTIVE AND OBJECTIVE BOX
Chief Complaint:  FU anemia    History of Present Illness:  pt resting, febrile earlier, ROS not obtainable, Aide at bedside; rectal tube with brown stool; no bleeding reported      MEDICATIONS  (STANDING):  albuterol/ipratropium for Nebulization 3 milliLiter(s) Nebulizer every 6 hours  AQUAPHOR (petrolatum Ointment) 1 Application(s) Topical once  artificial  tears Solution 1 Drop(s) Both EYES every 6 hours  ascorbic acid 500 milliGRAM(s) Oral daily  Biotene Dry Mouth Oral Rinse 5 milliLiter(s) Swish and Spit every 6 hours  calcium carbonate 1250 mG  + Vitamin D (OsCal 500 + D) 1 Tablet(s) Oral <User Schedule>  cefepime   IVPB 2000 milliGRAM(s) IV Intermittent every 8 hours  chlorhexidine 0.12% Liquid 15 milliLiter(s) Oral Mucosa every 12 hours  chlorhexidine 4% Liquid 1 Application(s) Topical daily  dextrose 5%. 1000 milliLiter(s) (50 mL/Hr) IV Continuous <Continuous>  dextrose 5%. 1000 milliLiter(s) (100 mL/Hr) IV Continuous <Continuous>  dextrose 5%. 1000 milliLiter(s) (100 mL/Hr) IV Continuous <Continuous>  dextrose 5%. 1000 milliLiter(s) (50 mL/Hr) IV Continuous <Continuous>  dextrose 50% Injectable 25 Gram(s) IV Push once  dextrose 50% Injectable 25 Gram(s) IV Push once  dextrose 50% Injectable 12.5 Gram(s) IV Push once  diclofenac sodium 1% Gel 2 Gram(s) Topical every 6 hours  escitalopram 10 milliGRAM(s) Oral <User Schedule>  fentaNYL   Patch  12 MICROgram(s)/Hr 1 Patch Transdermal every 72 hours  fentaNYL   Patch  25 MICROgram(s)/Hr 1 Patch Transdermal every 72 hours  gabapentin Solution 250 milliGRAM(s) Oral <User Schedule>  glucagon  Injectable 1 milliGRAM(s) IntraMuscular once  glucagon  Injectable 1 milliGRAM(s) IntraMuscular once  hemorrhoidal Ointment 1 Application(s) Rectal at bedtime  influenza  Vaccine (HIGH DOSE) 0.5 milliLiter(s) IntraMuscular once  insulin glargine Injectable (LANTUS) 12 Unit(s) SubCutaneous <User Schedule>  insulin lispro (ADMELOG) corrective regimen sliding scale   SubCutaneous every 6 hours  insulin lispro Injectable (ADMELOG) 38 Unit(s) SubCutaneous <User Schedule>  lactobacillus acidophilus 1 Tablet(s) Oral <User Schedule>  levothyroxine 125 MICROGram(s) Oral <User Schedule>  lidocaine   4% Patch 4 Patch Transdermal every 24 hours  melatonin 12 milliGRAM(s) Oral <User Schedule>  nystatin Powder 1 Application(s) Topical every 8 hours  pantoprazole  Injectable 40 milliGRAM(s) IV Push every 12 hours  predniSONE  Solution 5 milliGRAM(s) Enteral Tube <User Schedule>  QUEtiapine 12.5 milliGRAM(s) Oral <User Schedule>  sodium chloride 1 Gram(s) Oral every 8 hours  sodium chloride 0.65% Nasal 1 Spray(s) Both Nostrils every 6 hours  vancomycin    Solution 125 milliGRAM(s) Oral every 6 hours  witch hazel Pads 1 Application(s) Topical every 12 hours    MEDICATIONS  (PRN):  acetaminophen   Oral Liquid .. 650 milliGRAM(s) Oral every 6 hours PRN Temp greater or equal to 38C (100.4F), Mild Pain (1 - 3)  aluminum hydroxide/magnesium hydroxide/simethicone Suspension 30 milliLiter(s) Enteral Tube every 4 hours PRN Dyspepsia  oxyCODONE    Solution 10 milliGRAM(s) Oral every 6 hours PRN Severe Pain (7 - 10)  oxyCODONE    Solution 2.5 milliGRAM(s) Oral every 6 hours PRN Mild Pain (1 - 3)  oxyCODONE    Solution 5 milliGRAM(s) Oral every 6 hours PRN Moderate Pain (4 - 6)      Allergies    walnut (Unknown)  metronidazole (Rash)  Lyrica (Unknown)  penicillin (Unknown)  Pineapple (Unknown)  Tagamet (Unknown)  heparin (Unknown)  Pecans (Unknown)  Hazelnut (Unknown)  meropenem (Rash)    Intolerances        Vital Signs Last 24 Hrs  T(C): 38.7 (08 Nov 2024 12:56), Max: 38.7 (08 Nov 2024 12:56)  T(F): 101.7 (08 Nov 2024 12:56), Max: 101.7 (08 Nov 2024 12:56)  HR: 108 (08 Nov 2024 15:14) (89 - 111)  BP: 134/68 (08 Nov 2024 11:49) (106/56 - 134/68)  BP(mean): --  RR: 18 (08 Nov 2024 12:56) (17 - 19)  SpO2: 100% (08 Nov 2024 15:14) (100% - 100%)    Parameters below as of 08 Nov 2024 12:56  Patient On (Oxygen Delivery Method): ventilator        PHYSICAL EXAM  General: adult in NAD  HEENT: trach  Neck: supple  CV: normal S1/S2   Lungs: clear to auscultation, no wheezes, no rales  Abdomen: soft non-tender non-distended, positive bowel sounds  Ext: no calf tenderness    LABS:                          7.1    10.73 )-----------( 176      ( 08 Nov 2024 06:51 )             23.9         Mean Cell Volume : 88.5 fl  Mean Cell Hemoglobin : 26.3 pg  Mean Cell Hemoglobin Concentration : 29.7 g/dL  Auto Neutrophil # : x  Auto Lymphocyte # : x  Auto Monocyte # : x  Auto Eosinophil # : x  Auto Basophil # : x  Auto Neutrophil % : x  Auto Lymphocyte % : x  Auto Monocyte % : x  Auto Eosinophil % : x  Auto Basophil % : x      Serial CBC's  11-08 @ 06:51  Hct-23.9 / Hgb-7.1 / Plat-176 / RBC-2.70 / WBC-10.73  Serial CBC's  11-07 @ 06:49  Hct-25.8 / Hgb-7.6 / Plat-174 / RBC-2.92 / WBC-12.94  Serial CBC's  11-06 @ 06:25  Hct-28.2 / Hgb-8.2 / Plat-175 / RBC-3.05 / WBC-14.40  Serial CBC's  11-05 @ 07:05  Hct-31.1 / Hgb-9.0 / Plat-202 / RBC-3.50 / WBC-13.29      11-08    132[L]  |  102  |  23  ----------------------------<  101[H]  4.1   |  18[L]  |  <0.30[L]    Ca    8.1[L]      08 Nov 2024 06:51  Phos  3.2     11-08  Mg     1.8     11-08    TPro  5.6[L]  /  Alb  2.0[L]  /  TBili  0.4  /  DBili  x   /  AST  25  /  ALT  15  /  AlkPhos  133[H]  11-08                      Radiology:

## 2024-11-08 NOTE — PROGRESS NOTE ADULT - NUTRITIONAL ASSESSMENT
Diet, NPO with Tube Feed:   Tube Feeding Modality: Jejunostomy  [employer] Peptide 1.5 (KFPEPT1.5RTH)  Total Volume for 24 Hours (mL): 960  Continuous  Until Goal Tube Feed Rate (mL per Hour): 80  Tube Feed Duration (in Hours): 12  Tube Feed Start Time: 06:00  Free Water Flush  Pump   Rate (mL per Hour): 50   Frequency: Every Hour  Free Water Flush Instructions:  50ml free water flushes Q1hr  Alfred(7 Gm Arginine/7 Gm Glut/1.2 Gm HMB     Qty per Day:  2  No Carb Prosource (1pkg = 15gms Protein)     Qty per Day:  1  Banatrol TF     Qty per Day:  1/2 packet per daily (11-01-24 @ 08:28) [Active]

## 2024-11-08 NOTE — PROGRESS NOTE ADULT - NS ATTEND AMEND GEN_ALL_CORE FT
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 Found to have Pseudomonas aeruginosa in sputum culture and urine culture with ESBL E. coli s/p course of meropenem. Course complicated by antibiotic-associated diarrhea.          - C/W current pain regiment, well controlled. C/W escitalopram and seroquel  - pt remains lethargic  but episodically back to baseline, likely septic encephalopathy  - CTH w/o acute changes  - continue lung protective ventilation. Does poorly on PS trials. Continue Duonebs.   - cont tx for cdiff, f/u ID reccs (prior cx's w/ PsA sputum and esbl ecoli uti)  - poss PNA on CT, cefepime for now, f/u ID reccs poss stop abx tomorrow  - monitor BM frequency, volume improved past 24h  - Anemia multifactorial, hemorrhoids, AOCD. s/p EGD without active bleeding, f/u Heme reccs  - cont to monitor FS    -  RA, on home prednisone 5 mg daily   - monitor hypoNa, tsh normal   - cont wound care (and f/u team reccs) to sacrum for decub (also likely moisture related rash areas on back)   - DVT ppx- only scd for now given significant anemia a few days ago

## 2024-11-08 NOTE — CHART NOTE - NSCHARTNOTEFT_GEN_A_CORE
NUTRITION FOLLOW UP NOTE    PATIENT SEEN FOR: follow up     SOURCE: [] Patient  [x] Current Medical Record  [x] Nursing  [] Family/support person at bedside  [x] Patient unavailable/inappropriate  [] Other:     CHART REVIEWED/EVENTS NOTED.  [] No changes to nutrition care plan to note  [x] Nutrition Status:  -PMH T2DM  -s/p trach (vent dependent) and chronic PEG   -s/p G-J tube exchange by GI on 10/21. J tube for meds and feeds per team    Diet, NPO with Tube Feed:   Tube Feeding Modality: Jejunostomy  [employer] Peptide 1.5 ([id number])  Total Volume for 24 Hours (mL): 960  Continuous  Until Goal Tube Feed Rate (mL per Hour): 80  Tube Feed Duration (in Hours): 12  Tube Feed Start Time: 06:00  Free Water Flush  Pump   Rate (mL per Hour): 50   Frequency: Every Hour  Free Water Flush Instructions:  50ml free water flushes Q1hr  Alfred(7 Gm Arginine/7 Gm Glut/1.2 Gm HMB     Qty per Day:  2  No Carb Prosource (1pkg = 15gms Protein)     Qty per Day:  1  Banatrol TF     Qty per Day:  1/2 packet per daily (24 @ 08:28) [Active]    CURRENT DIET ORDER IS:  [] Appropriate:  [] Inadequate:  [x] Other: see below for recommendations     NUTRITION INTAKE/PROVISION:  [] PO:  [x] Enteral Nutrition: [employer] Peptide 1.5 + Alfred BID + No carb Prosource daily + 1/2 packet Banatrol daily providing at 100% provision: 1768kcal (32.5kcal/kg), 92g protein (1.7g/kg) and 672ml free water.   -->10/24/24: Daughter continuing to request to continue 80ml/hr of tube feeding be provided x 12 hours.  --> Provider to RN order placed to ensure full tube feeding order of 960ml is provided to the patient.   [] Parenteral Nutrition:    ANTHROPOMETRICS:  Drug Dosing Weight  Height (cm): 149.9 (21 Oct 2024 18:49)  Weight (kg): 54.4 (21 Oct 2024 18:49)  BMI (kg/m2): 24.2 (21 Oct 2024 18:49)  BSA (m2): 1.48 (21 Oct 2024 18:49)  10/16 62.5kg   Daily Weight in k.6 ()  RD will continue to monitor trends.     MEDICATIONS  (STANDING):  ascorbic acid  calcium carbonate 1250 mG  + Vitamin D (OsCal 500 + D)  cefepime   IVPB  dextrose 5%.  dextrose 5%.  dextrose 5%.  dextrose 5%.  dextrose 50% Injectable  dextrose 50% Injectable  dextrose 50% Injectable  glucagon  Injectable  glucagon  Injectable  insulin glargine Injectable (LANTUS)  insulin lispro (ADMELOG) corrective regimen sliding scale  insulin lispro Injectable (ADMELOG)  levothyroxine  pantoprazole  Injectable  predniSONE  Solution  sodium chloride  vancomycin    Solution    Pertinent Labs:  @ 06:51: Na 132[L], BUN 23, Cr <0.30[L], [H], K+ 4.1, Phos 3.2, Mg 1.8, Alk Phos 133[H], ALT/SGPT 15, AST/SGOT 25, HbA1c --    A1C with Estimated Average Glucose Result: 5.8 % (10-14-24 @ 05:08)  A1C with Estimated Average Glucose Result: 6.4 % (24 @ 07:32)    Finger Sticks:  POCT Blood Glucose.: 226 mg/dL ( @ 11:43)  POCT Blood Glucose.: 111 mg/dL ( @ 05:26)  POCT Blood Glucose.: 174 mg/dL ( @ 23:25)  POCT Blood Glucose.: 202 mg/dL ( @ 17:22)  POCT Blood Glucose.: 149 mg/dL ( @ 15:22)    NUTRITIONALLY PERTINENT MEDICATIONS/LABS:  [x] Reviewed  [x] Relevant notes on medications/labs:  -iron low (10/30/24)   -hyponatremia noted   -intermittent episodes of hyperglycemia and hypoglycemia noted. remains on 12 hour feeding (06:00 until 18:00 or until full volume completed) and endocrinology following. Insulin regimen deferred to endocrine/team.     EDEMA:  [x] Reviewed  [x] Relevant notes:  per nursing flow sheets:  trace generalized, left foot    GI/ I&O:  [x] Reviewed  [] Relevant notes:  [x] Other: c.diff positive. rectal tube back in place     SKIN:   sacrum stage IV per flow sheets    ESTIMATED NEEDS:  Based on: dosing weight 54.4kg   30-35kcal/kg 1632-1904kcal/day  1.4-1.8g/kg 76-98g protein/day  defer fluid needs to team     NUTRITION DIAGNOSIS:  [x] Prior Dx: increased nutrient needs  [] New Dx:    EDUCATION:  [] Yes:  [x] Not appropriate/warranted  10/15/24 Nutrition Chart Note: Spoke with pt daughter Marysol Spann over phone ([phone]) to confirm pt home tube feeding regimen. Per daughter, pt receiving [employer] Peptide 1.5, 3 bottles/day. 80ml/hr x 12 hours.     NUTRITION CARE PLAN:  1. Diet: Can continue [employer] Peptide 1.5 for a total volume of 960ml. Rate/hr deferred to team. Can continue banatrol to aid in stool bulking, prosource to aid in added protein/calories for wound healing and Alfred to aid in wound healing.   -->Insulin regimen deferred to team/endocrinology   -->Defer free water flush to team   -->Do not mix prosource and banatrol together.   -->Alfred to be mixed with a minimum of 4ox of water per [employer]   2. Continue vitamin/mineral regimen as ordered per team   3. Consider iron supplement if no contraindications      [] Achieved - Continue current nutrition intervention(s)  [] Current medical condition precludes nutrition intervention at this time.    MONITORING AND EVALUATION:   RD remains available upon request and will follow up per protocol.    Rufina Morris, MS, RD, CDN / Teams

## 2024-11-08 NOTE — PROGRESS NOTE ADULT - TIME BILLING
<<-----Click here for Discharge Medication Review Reviewing the EMR, vitals, imaging, medication list, recent labs, prior records and coordinating care with medical providers. This time excludes procedures and teaching.

## 2024-11-08 NOTE — PROGRESS NOTE ADULT - PROBLEM SELECTOR PLAN 10
- Patient on Sodium Chloride tabs prior to admission   - Remains on small Volumes of free h20 flushes to prevent J -Tube from clogging  -  NACL Tabs increased 1 gram q 8 hrs   - Monitor BMP

## 2024-11-08 NOTE — PROGRESS NOTE ADULT - ASSESSMENT
73 yo F PMH T2DM on insulin, HTN, HLD, hypothyroidism, RA on Prednisone, Fibromyalgia, cerebral aneurysm s/p repair, chronic hypercapnic respiratory failure s/p trach (vent dependent) and chronic PEG 2022, recurrent C. diff infection (follows with Dr. 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  Pt also noted to have rectal bleeding this past week requiring transfusion 5 days ago in ED as per daughter. Daughter also reports brownish discharge from G-J tube. In ED, pt afebrile, fiO2 96-98% on 40% on ventilator.  Chest X-ray w/ opacification of right lung concerning for possible PNA.  Admitted to RCU for further management. Sputum cxs grew PSA; treated with course of Cefepime. Patient with decreased H+H received 1 unit of PRBCS on 10/10.  10/14 EGD w/ Dr. Rosales for PEGJ exchange and clippings placed for closure of fistula site.  Persistent fevers treated with meropenem and vanc (d/c'd 10/20).  CT A/P without infectious source.  Continued fevers and persistent leukocytosis 11/1 CDiff positive - po vanco started, IV flagyl added 11/3. Head CT performed on 11/6 due to concern of lethargy no acute intracranial findings were noted; likely related to metabolic encephalopathy. Patient continued to have persistent fevers and was empirically treated with Cefepime For CR PSA in sputum cx11/5-11/9. CT C/A/P performed 11/6 c/w consolidative opacity LLL. Continue airway management with duonebs, chest PT and suction PRN.           11/8: No events reported overnight. Patient with low grade temp this afternoon 100.8 case d/w ID will dc Flagyl and Discontinue Cefepime tomorrow on 11/9. Will monitor off Cefepime tomorrow for possible drug fever. Will re-culture for temp > 101. Patient remains on enteral vanco.

## 2024-11-08 NOTE — PROGRESS NOTE ADULT - ASSESSMENT
73 yo woman PMH T2DM on insulin, HTN, HLD, hypothyroidism, RA on Prednisone, Fibromyalgia, cerebral aneurysm s/p repair, chronic hypercapnic respiratory failure s/p trach (vent dependent) and chronic PEG 2022, recurrent C. diff infection , persistent GJ tube leaks now s/p GC fistula repair 8/12  admitted 10/7/24 with resp distress and found to have RML opacity     Antibiotics  Ceftriaxone 10/7  Azithro 10/7  Cefepime 10/7--> 10/12  meropenem 10/15 --> 10/23  IV Vanco 10/17 --> 10/21  Vanco via NGT 10/24; 11/1-->  Metronidazole 11/3 --> 11/7  Cefepime 11/5 -->      10/10 melena, anemia, blood tx  10/14 EGD for GJ exchange and piror PEG site closure  One benign-appearing, intrinsic moderate stenosis was found in the upper third of the        esophagus. The stenosis was traversed.       The cardia and gastric fundus were normal.       A gastric tube with tip in the jejunum was found in the gastric body. The PEG required        removal because it was leaking. The J tube extension (12F) was removed first. An externally        removable 24 Fr [employer] Safety gastrostomy tube was lubricated. The guide wire was passed        through the existing G-tube port and snared endoscopically. The endoscope and snare were then        removed, pulling the wire out through the mouth. The g-tube was tied to the guidewire, pulled        through the mouth into the stomach and then pulled out from the stomach through the skin. The        bumper was attached to the gastrostomy tube. The feeding tube was then cut to an appropriate        length. The final position of the gastrostomy tube was confirmed by relook endoscopy, and        skin marking noted to be 3 cm at the external bumper. The final tension and compression of        the abdominal wall by the PEG tube and external bumper were checked and revealed that the        bumper was moderately tight and mildly deforming the skin. The J tube extension (12 F        EndoVive Jejunal feeding tube) was advanced into the stomach. The tip of the J tube had a        loop thread that was captured with a Resolution clip. The thread and the J tube extension        were advanced to the proximal jejunum, and the endoclip along with the tip of the J tube was        attached to the wall securely. The J tube extension was capped, and the tube site was cleaned        and dressed.       A small fistula was found on the anterior wall of the gastric body related to prior G tube        site. Coagulation of tissue near the fistula using argon plasma at 1.2 liters/minute and 35        peraza was successful. To closure the gastrocutaneous fistula, four hemostatic clips were        successfully placed (MR conditional, two 16 mm DuraClip and 2 Mantis clips.       The examined duodenum was normal.    10/14, 10/15 Fever, transient hypoxia post procedure  10/15 ProCalcitonin = 8.48 suggestive of bacterial process; BCx x2 NGTD; Trach: Pseudomonas   - though benign mg stain  10;16 Leukocytosis WBC = 14.26  10/17 fever, hypotension, abd distension, no diarrhea noted this am WBC = 15.02; Rising Procalcitonin = 38.49; Obstructed J tube   10/18  lost IV access re-gained  - CT scan late this afternoon  WBC = 8.68; Rising Procalcitonin >100  10/18 imaging suggests multifocal pneumonia  10/21 improved chest exam, Procalcitonin level considerably decreased, decreased FiO2  - afebrile since 10/18  10/21 UGI endoscopy done  Findings:       The esophagus was normal. A fistula was found on the anterior wall of the gastric body.       There was evidence of a gastrostomy present in the gastric body. The patient was placed in        the supine position. The endoscope was advanced to the jejunum and the prior placed J tube        with loop attached to the wall and the loop thread was cut by endoclip. The J tube and the G        tube were removed. The gastrostomy fistula was utilized and an Avanos 22 F        Gastrostomy/Jejunal tube was advanced. The jejunal tip was advanced through the pylorus and        into the duodenum. The endoscope was then advanced to the duodenum and the loop thread at the        tip of the J tube was captured and advanced to the proximal jejunum. The loop thread was        clipped to wall by a [employer] Resolution clip. The J tube flushed easily and the G        tube decompress the abdomen easily. The internal balloon was insufflated with 10 cc of water        to hold the GJ tube in place. The outside marking was at 3.  10/23  no distress, liquid stools noted  - Meropenem discontinued  GI PCR NEG  10/24  persistent diarrhea  Cdiff NEG; enteral vanco stopped and Immodium provided  10/28 low grade temps, mild leukocytosis  10/30 blood transfusion  11/1  fever  +Cdiff  11/3 continued fever  - +BC Stph epi most likely procurement contaminant,  modest diarrhea reported  - daughter described increased diarrhea in evenings - Pseudomonas airway colonization is long term, no suggestion of pneumonia at present, sacral decub remains superifical will granulated without signs of infection  11/4  - fever, abnormal chest exam though FIO2 still 30%  rectal tube inserted for increased diarrhea  11/5 lethargy, fever, leukocytosis, increased lactate, hyponatremia  11/6 sputum gram stain suggests persistent Pseudomonas colonization  - continued fever, leukocytosis  11/7 unclear if LLL consolidation represents infection. Repeat Procalcitonin  11/7= 2.92 decreased  11/8 continued fevers  +diarrhea, lethargy continues  to complete Cefepime for LLL pneumonia v atelectasis tomorrow 11/9    Issues  recurrent Cdiff  cerebral aneurysm s/p repair  chronic hypercapnic respiratory failure s/p trach (vent dependent) and chronic PEG 2022  anemia  - had iron deficiency  Pseudomonas aeruginosa upper airway colonization  (most recent isolate Resist to Cipro/Levo)  T2DM on insulin  10.14.24 -A1C: 5.8%  HTN  HLD  hypothyroidism  RA on Prednisone  Fibromyalgia  debility  sacral ulcer largely healed  malnourished/chronic catabolic state      Suggest  Continue vanco 125 q 6 hours via J tube 11/1 -->  Cefepime 11/5 -->  complete tomorrow 11/9      discussed with RCU ACP

## 2024-11-08 NOTE — PROGRESS NOTE ADULT - ASSESSMENT
73 yo F PMH T2DM on insulin, HTN, HLD, hypothyroidism, RA on Prednisone, Fibromyalgia, cerebral aneurysm s/p repair, chronic hypercapnic respiratory failure s/p trach (vent dependent) and chronic PEG 2022, recurrent C. diff infection (follows with Dr. Newman), persistent GJ tube leaks now s/p GC fistula repair 8/12 with hypoxia, PNA, with anemia    #anemia, AOCD + phlebotomy  - pt with multiple chronic issues causing AOCD with acute worsening in context of acute illness + phlebotomy  - was fecal occult + on admission but now no overt bleeding  - no renal insufficiency  - s/p PRBC 10/10, 10/30  - iron studies 10/30 with AOCD, no evidence of hemolysis  - WBC/plt stable; plt with some fluctuation, currently normal  - B12/folate normal  - discussed with daughter prior that unfortunately no simple treatment for anemia of chronic disease besides transfusions as needed  - Hg slowly declining, no evidence of bleeding; will add type and screen to AM labs, transfuse prn    d/w RCU NP

## 2024-11-08 NOTE — PROGRESS NOTE ADULT - PROBLEM SELECTOR PLAN 1
Inpatient Plan:  - Please monitor blood glucose values q 6 hours while on tube feeding or NPO  - C/w Lantus to 12u @0600  - Adjust Admelog to 24u @0600 and 38u @1200 (if BG <100s administer 9u @0600 or 19u @1200; Hold if TFs are on hold)  - Start adjust low dose Admelog correctional scale q6h while on TFs and overnight    Discharge Plan:  - TBD depending on insulin requirement and discharge tube feeding regimen (Bolus vs continuous tube feeding)   - If bolus tube feeding > Lantus plus Humalog   - Patient should have BGs checked 4x a day while on TFs.    Daughter aware to contact Endocrinologist if BG <70mg/dL x1 or >200mg/dL consistently or >400mg/dL x1.   - Followup with Dr Ruth: Endocrinology [facility]: [address] [place] [address]. [address]. [place] [address]. Tel: [phone] Telemedicine- daughter to schedule.   - Make sure pt has Rx for all DM supplies and insulin Inpatient Plan:  - Please monitor blood glucose values q 6 hours while on tube feeding or NPO  - C/w Lantus to 12u @0600  - Adjust Admelog to 24u @0600 and 40u @1200 (if BG <100s administer 9u @0600 or 19u @1200; Hold if TFs are on hold)  - Start adjust low dose Admelog correctional scale q6h while on TFs and overnight    Discharge Plan:  - TBD depending on insulin requirement and discharge tube feeding regimen (Bolus vs continuous tube feeding)   - If bolus tube feeding > Lantus plus Humalog   - Patient should have BGs checked 4x a day while on TFs.    Daughter aware to contact Endocrinologist if BG <70mg/dL x1 or >200mg/dL consistently or >400mg/dL x1.   - Followup with Dr Ruth: Endocrinology [facility]: [address] [place] [address]. [address]. [place] [address]. Tel: [phone] Telemedicine- daughter to schedule.   - Make sure pt has Rx for all DM supplies and insulin

## 2024-11-08 NOTE — PROGRESS NOTE ADULT - PROBLEM SELECTOR PLAN 2
[] PNA / Intermittent fevers  - Sputum cx 10/8: PSA  - CXR 10/7: Opacification of the right middle lobe may represent pneumonia versus atelectasis  - CT A/P 10/18: ? Multifocal pneumonia  - Completed Initial course of cefepime on 10/12  - Sputum 11/4: PSA  - Repeat CT C/A/P: Consolidative opacity LLL  - Cefepime restarted 11/5-->11/9; in setting of recurrent fevers  - Will dc Cefepime tomorrow and monitor temp curve for possible drug related fever    [] C.Diff   - Stool C.diff: + 11/1   - S/p Flagyl 11/3-11/8  - Continue enteral Vanco [] PNA / Intermittent fevers  - Sputum cx 10/8: PSA  - CXR 10/7: Opacification of the right middle lobe may represent pneumonia versus atelectasis  - CT A/P 10/18: ? Multifocal pneumonia  - Completed Initial course of cefepime on 10/12  - Sputum 11/4: PSA  - Repeat CT C/A/P: Consolidative opacity LLL  - Cefepime restarted 11/5-->11/9; in setting of recurrent fevers  - Will dc Cefepime tomorrow and monitor temp curve for possible drug related fever    [] C.Diff   - Stool C.diff: + 11/1   - S/p Flagyl 11/3-11/8  - Continue enteral Vanco  - + RT remains in place; monitor output

## 2024-11-08 NOTE — PROGRESS NOTE ADULT - PROBLEM SELECTOR PLAN 12
- Patients daughter updated at bedside by RCU Team today; d/w daughter Tentative dc planning for 11/12  - Daughter states she will reach out to [facility]/ [facility] to check availability

## 2024-11-08 NOTE — PROGRESS NOTE ADULT - SUBJECTIVE AND OBJECTIVE BOX
Patient seen today for follow up inpatient Diabetes Mellitus management.    Chief Complaint: Type 2 Diabetes Mellitus     INTERVAL HX:  Patient seen in [facility] RCU1 [id number] W1. Patient is alert, nonverbal trached on ventilator, nods appropriate intermittently. Aid at bedside. Patient continues on 12hr TFs (6A-6P) Casie Frazier Peptide 1.5 @80cc/hr, goal, tolerating. Patient does have diarrhea- rectal tube intact/in place. FBG stable this am at 111mg/dL. No hypoglycemia. BG at 1200 226mg/dL. Blood glucose levels in the last 24hrs have been 111-226mg/dL.     Review of Systems:  General: As above.  Respiratory: FARAZ, trached on ventilator and nonverbal- nods at times.   Gastrointestinal: FARAZ, trached on ventilator and nonverbal- nods at times.   Endocrine: FARAZ, trached on ventilator and nonverbal- nods at times.     Allergies  walnut (Unknown)  metronidazole (Rash)  Lyrica (Unknown)  penicillin (Unknown)  Pineapple (Unknown)  Tagamet (Unknown)  heparin (Unknown)  Pecans (Unknown)  Hazelnut (Unknown)  meropenem (Rash)      Intolerances  None.       MEDICATIONS  (STANDING):  acetaminophen   Oral Liquid .. 650 milliGRAM(s) Oral every 6 hours PRN  albuterol/ipratropium for Nebulization 3 milliLiter(s) Nebulizer every 6 hours  aluminum hydroxide/magnesium hydroxide/simethicone Suspension 30 milliLiter(s) Enteral Tube every 4 hours PRN  AQUAPHOR (petrolatum Ointment) 1 Application(s) Topical once  artificial  tears Solution 1 Drop(s) Both EYES every 6 hours  ascorbic acid 500 milliGRAM(s) Oral daily  Biotene Dry Mouth Oral Rinse 5 milliLiter(s) Swish and Spit every 6 hours  calcium carbonate 1250 mG  + Vitamin D (OsCal 500 + D) 1 Tablet(s) Oral <User Schedule>  cefepime   IVPB 2000 milliGRAM(s) IV Intermittent every 8 hours  chlorhexidine 0.12% Liquid 15 milliLiter(s) Oral Mucosa every 12 hours  chlorhexidine 4% Liquid 1 Application(s) Topical daily  dextrose 5%. 1000 milliLiter(s) IV Continuous <Continuous>  dextrose 5%. 1000 milliLiter(s) IV Continuous <Continuous>  dextrose 5%. 1000 milliLiter(s) IV Continuous <Continuous>  dextrose 5%. 1000 milliLiter(s) IV Continuous <Continuous>  dextrose 50% Injectable 25 Gram(s) IV Push once  dextrose 50% Injectable 12.5 Gram(s) IV Push once  dextrose 50% Injectable 25 Gram(s) IV Push once  diclofenac sodium 1% Gel 2 Gram(s) Topical every 6 hours  escitalopram 10 milliGRAM(s) Oral <User Schedule>  fentaNYL   Patch  12 MICROgram(s)/Hr 1 Patch Transdermal every 72 hours  fentaNYL   Patch  25 MICROgram(s)/Hr 1 Patch Transdermal every 72 hours  gabapentin Solution 250 milliGRAM(s) Oral <User Schedule>  glucagon  Injectable 1 milliGRAM(s) IntraMuscular once  glucagon  Injectable 1 milliGRAM(s) IntraMuscular once  hemorrhoidal Ointment 1 Application(s) Rectal at bedtime  influenza  Vaccine (HIGH DOSE) 0.5 milliLiter(s) IntraMuscular once  insulin glargine Injectable (LANTUS) 12 Unit(s) SubCutaneous <User Schedule>  insulin lispro (ADMELOG) corrective regimen sliding scale   SubCutaneous every 6 hours  insulin lispro Injectable (ADMELOG) 38 Unit(s) SubCutaneous <User Schedule>  lactobacillus acidophilus 1 Tablet(s) Oral <User Schedule>  levothyroxine 125 MICROGram(s) Oral <User Schedule>  lidocaine   4% Patch 4 Patch Transdermal every 24 hours  melatonin 12 milliGRAM(s) Oral <User Schedule>  nystatin Powder 1 Application(s) Topical every 8 hours  oxyCODONE    Solution 2.5 milliGRAM(s) Oral every 6 hours PRN  oxyCODONE    Solution 5 milliGRAM(s) Oral every 6 hours PRN  oxyCODONE    Solution 10 milliGRAM(s) Oral every 6 hours PRN  pantoprazole  Injectable 40 milliGRAM(s) IV Push every 12 hours  predniSONE  Solution 5 milliGRAM(s) Enteral Tube <User Schedule>  QUEtiapine 12.5 milliGRAM(s) Oral <User Schedule>  sodium chloride 1 Gram(s) Oral every 8 hours  sodium chloride 0.65% Nasal 1 Spray(s) Both Nostrils every 6 hours  vancomycin    Solution 125 milliGRAM(s) Oral every 6 hours  witch hazel Pads 1 Application(s) Topical every 12 hours      dextrose 50% Injectable 12.5 Gram(s) IV Push once  dextrose 50% Injectable 25 Gram(s) IV Push once  dextrose 50% Injectable 25 Gram(s) IV Push once  glucagon  Injectable 1 milliGRAM(s) IntraMuscular once  glucagon  Injectable 1 milliGRAM(s) IntraMuscular once  insulin glargine Injectable (LANTUS) 12 Unit(s) SubCutaneous <User Schedule>  insulin lispro (ADMELOG) corrective regimen sliding scale   SubCutaneous every 6 hours  insulin lispro Injectable (ADMELOG) 38 Unit(s) SubCutaneous <User Schedule>  levothyroxine 125 MICROGram(s) Oral <User Schedule>  predniSONE  Solution 5 milliGRAM(s) Enteral Tube <User Schedule>      insulin lispro (ADMELOG) corrective regimen sliding scale   SubCutaneous every 6 hours  insulin lispro Injectable (ADMELOG) 38 Unit(s) SubCutaneous <User Schedule>      PHYSICAL EXAM:  VITALS:   T(C): 37.9 (11-08-24 @ 11:49), Max: 37.9 (11-08-24 @ 11:49)  HR: 98 (11-08-24 @ 11:49) (89 - 113)  BP: 134/68 (11-08-24 @ 11:49) (106/56 - 144/99)  RR: 18 (11-08-24 @ 11:49) (17 - 19)  SpO2: 100% (11-08-24 @ 11:49) (98% - 100%)    GENERAL: In no acute distress  Respiratory: Respirations unlabored, trached on ventilator   Extremities: Warm and dry, no edema  NEURO: Alert, nonverbal, and appropriate     LABS:  POCT Blood Glucose.: 226 mg/dL (11-08-24 @ 11:43)  POCT Blood Glucose.: 111 mg/dL (11-08-24 @ 05:26)  POCT Blood Glucose.: 174 mg/dL (11-07-24 @ 23:25)  POCT Blood Glucose.: 202 mg/dL (11-07-24 @ 17:22)  POCT Blood Glucose.: 149 mg/dL (11-07-24 @ 15:22)  POCT Blood Glucose.: 105 mg/dL (11-07-24 @ 13:07)  POCT Blood Glucose.: 89 mg/dL (11-07-24 @ 12:02)  POCT Blood Glucose.: 89 mg/dL (11-07-24 @ 05:59)  POCT Blood Glucose.: 178 mg/dL (11-06-24 @ 23:57)  POCT Blood Glucose.: 248 mg/dL (11-06-24 @ 18:02)  POCT Blood Glucose.: 165 mg/dL (11-06-24 @ 12:31)  POCT Blood Glucose.: 129 mg/dL (11-06-24 @ 05:37)  POCT Blood Glucose.: 219 mg/dL (11-06-24 @ 00:52)  POCT Blood Glucose.: 283 mg/dL (11-05-24 @ 23:38)  POCT Blood Glucose.: 309 mg/dL (11-05-24 @ 17:46)  POCT Blood Glucose.: 276 mg/dL (11-05-24 @ 11:59)                          7.1    10.73 )-----------( 176      ( 08 Nov 2024 06:51 )             23.9     11-08    132[L]  |  102  |  23  ----------------------------<  101[H]  4.1   |  18[L]  |  <0.30[L]    Ca    8.1[L]      08 Nov 2024 06:51  Phos  3.2     11-08  Mg     1.8     11-08    TPro  5.6[L]  /  Alb  2.0[L]  /  TBili  0.4  /  DBili  x   /  AST  25  /  ALT  15  /  AlkPhos  133[H]  11-08    LIVER FUNCTIONS - ( 08 Nov 2024 06:51 )  Alb: 2.0 g/dL / Pro: 5.6 g/dL / ALK PHOS: 133 U/L / ALT: 15 U/L / AST: 25 U/L / GGT: x                 Urinalysis Basic - ( 08 Nov 2024 06:51 )    Color: x / Appearance: x / SG: x / pH: x  Gluc: 101 mg/dL / Ketone: x  / Bili: x / Urobili: x   Blood: x / Protein: x / Nitrite: x   Leuk Esterase: x / RBC: x / WBC x   Sq Epi: x / Non Sq Epi: x / Bacteria: x      A1C with Estimated Average Glucose Result: A1C with Estimated Average Glucose Result: 5.8 % (10-14-24 @ 05:08)  A1C with Estimated Average Glucose Result: 6.4 % (08-12-24 @ 07:32)

## 2024-11-08 NOTE — PROGRESS NOTE ADULT - ASSESSMENT
73 yo F w/h/o controlled T2DM (A1C 5.8%) on insulin at home. Also HTN, HLD, hypothyroidism, RA on Prednisone, Fibromyalgia, cerebral aneurysm s/p repair, chronic hypercapnic respiratory failure s/p trach (vent dependent) and chronic PEG 2022, recurrent C. diff infection (follows with Dr. 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 S/p antibiotics and s/p GJ tube exchanges on 10/14, s/p PEG replacement 10/21. Endocrine consulted for Type 2 DM management while on tube feeding. She continues on tube feeding ([employer] Peptide 1.5 ([id number]) at 80 ml/hr)  for 12 hours (6A-6P), goal rate and tolerating but positive for diarrhea, rectal tube in place. +C Diff. FBG stable this am but on lower side- Lantus adjusted to prevent hypoglycemia. No hypoglycemia. BG at 1200 >200mg/dL- will slightly adjust Admelog at 0600. Endocrine will closely monitor BG and adjust insulin as needed for BG goal 100-180mg/dL inpatient.          Home DM medications: Lantus 35 units at HS, Humalog 26 units with # 1 feeding, 22 units with #2 tube feeding, 20 units with #3 tube feeding and  Humalog correction scale (  2 units for -165, 4 units for -250, 6 units for -300, 8units for -350, 10 units for -400, 12 units for -450)   while on [employer] formula 3 cans/day, slow bolus( run at 80 ml/hr total volume 960 ml/day> 1st can around 6:30-8:30, 2nd can around 3 pm, 3rd can around 8-9 pm) plus Banatrol 1/2 packet BID, was increasing to 1 packet BID, plus Kefir 10 oz/day ( divided in multiple times throughout the day).

## 2024-11-08 NOTE — PROGRESS NOTE ADULT - SUBJECTIVE AND OBJECTIVE BOX
Follow Up:  fevers, cdiff    Interval History/ROS:  lethargic, sleepy   at bedside    Allergies  walnut (Unknown)  metronidazole (Rash)  Lyrica (Unknown)  penicillin (Unknown)  Pineapple (Unknown)  Tagamet (Unknown)  heparin (Unknown)  Pecans (Unknown)  Hazelnut (Unknown)  meropenem (Rash)      ANTIMICROBIALS:  cefepime   IVPB 2000 every 8 hours  vancomycin    Solution 125 every 6 hours      OTHER MEDS:  MEDICATIONS  (STANDING):  acetaminophen   Oral Liquid .. 650 every 6 hours PRN  albuterol/ipratropium for Nebulization 3 every 6 hours  aluminum hydroxide/magnesium hydroxide/simethicone Suspension 30 every 4 hours PRN  dextrose 50% Injectable 25 once  dextrose 50% Injectable 25 once  dextrose 50% Injectable 12.5 once  escitalopram 10 <User Schedule>  fentaNYL   Patch  12 MICROgram(s)/Hr 1 every 72 hours  fentaNYL   Patch  25 MICROgram(s)/Hr 1 every 72 hours  gabapentin Solution 250 <User Schedule>  glucagon  Injectable 1 once  glucagon  Injectable 1 once  influenza  Vaccine (HIGH DOSE) 0.5 once  insulin glargine Injectable (LANTUS) 12 <User Schedule>  insulin lispro (ADMELOG) corrective regimen sliding scale  every 6 hours  insulin lispro Injectable (ADMELOG) 38 <User Schedule>  levothyroxine 125 <User Schedule>  melatonin 12 <User Schedule>  oxyCODONE    Solution 10 every 6 hours PRN  oxyCODONE    Solution 2.5 every 6 hours PRN  oxyCODONE    Solution 5 every 6 hours PRN  pantoprazole  Injectable 40 every 12 hours  predniSONE  Solution 5 <User Schedule>  QUEtiapine 12.5 <User Schedule>      Vital Signs Last 24 Hrs  T(C): 38.7 (08 Nov 2024 12:56), Max: 38.7 (08 Nov 2024 12:56)  T(F): 101.7 (08 Nov 2024 12:56), Max: 101.7 (08 Nov 2024 12:56)  HR: 108 (08 Nov 2024 15:14) (89 - 111)  BP: 134/68 (08 Nov 2024 11:49) (106/56 - 134/68)  BP(mean): --  RR: 18 (08 Nov 2024 12:56) (18 - 19)  SpO2: 100% (08 Nov 2024 15:14) (100% - 100%)    Parameters below as of 08 Nov 2024 12:56  Patient On (Oxygen Delivery Method): ventilator        PHYSICAL EXAM:  General:  NAD, Non-toxic  Neurology: sleeping  Respiratory: Clear to auscultation bilaterally  CV: RRR, S1S2, no murmurs, rubs or gallops  Abdominal: Soft, Non-tender, distended,  Extremities: generalized edema  Line Sites: Clear  Skin: No rash                          7.1    10.73 )-----------( 176      ( 08 Nov 2024 06:51 )             23.9   WBC Count: 10.73 (11-08 @ 06:51)  WBC Count: 12.94 (11-07 @ 06:49)  WBC Count: 14.40 (11-06 @ 06:25)  WBC Count: 13.29 (11-05 @ 07:05)  WBC Count: 12.33 (11-04 @ 06:29)    11-08    132[L]  |  102  |  23  ----------------------------<  101[H]  4.1   |  18[L]  |  <0.30[L]    Ca    8.1[L]      08 Nov 2024 06:51  Phos  3.2     11-08  Mg     1.8     11-08    TPro  5.6[L]  /  Alb  2.0[L]  /  TBili  0.4  /  DBili  x   /  AST  25  /  ALT  15  /  AlkPhos  133[H]  11-08      Urinalysis (11.03.24 @ 07:10)   Glucose Qualitative, Urine: Negative mg/dL  Blood, Urine: Negative  pH Urine: 6.0  Color: Yellow  Urine Appearance: Clear  Bilirubin: Negative  Ketone - Urine: Trace mg/dL  Specific Gravity: 1.011  Protein, Urine: Trace mg/dL  Urobilinogen: 0.2 mg/dL  Nitrite: Negative  Leukocyte Esterase Concentration: Negative    11.07.24 @ 06:48) Procalcitonin: 2.92  (11.01.24 @ 06:15) Procalcitonin: 4.56  (10.23.24 @ 06:45) Procalcitonin: 5.80  10.22.24 @ 07:07) Procalcitonin: 13.69  (10.21.24 @ 06:43) Procalcitonin: 23.10  10.20.24 @ 07:02) Procalcitonin: 47.59  (10.19.24 @ 04:22) Procalcitonin: 88.93  (10.18.24 @ 14:48) Procalcitonin: >100.00:         MICROBIOLOGY:  .Sputum Sputum  11-04-24   Moderate Mixed gram negative rods including  Moderate Pseudomonas aeruginosa  Commensal vinay consistent with body site  --  Pseudomonas aeruginosa      Clean Catch Clean Catch (Midstream)  11-03-24   No growth  --  --      .Blood BLOOD  11-03-24   No growth at 5 days  --  --      .Blood BLOOD  11-03-24   No growth at 5 days  --  --      .Blood BLOOD  11-01-24   Growth in anaerobic bottle: Staphylococcus epidermidis  Isolation of Coagulase negative Staphylococcus from single blood culture  sets may represent  contamination. Contact the Microbiology Department at [phone] if  susceptibility testing is needed.  clinically indicated.  Direct identification is available within approximately 3-5  hours either by Blood Panel Multiplexed PCR or Direct  MALDI-TOF. Details: [email or web address]  --  Blood Culture PCR      Catheterized Catheterized  11-01-24   No growth  --  --      .Blood BLOOD  11-01-24   No growth at 5 days  --  --      Trach Asp Tracheal Aspirate  10-27-24   Moderate Pseudomonas aeruginosa  Moderate Pseudomonas aeruginosa #2  Multiple Morphological Strains  Commensal vinay consistent with body site  --  Pseudomonas aeruginosa  Pseudomonas aeruginosa      .Blood BLOOD  10-27-24   No growth at 5 days  --  --      .Blood BLOOD  10-17-24   No growth at 5 days  --  --      .Blood BLOOD  10-17-24   No growth at 5 days  --  --      .Blood BLOOD  10-15-24   No growth at 5 days  --  --      Trach Asp Tracheal Aspirate  10-15-24   Mixed gram negative rods including  Numerous Pseudomonas aeruginosa  Commensal vinay consistent with body site  --  Pseudomonas aeruginosa      .Blood BLOOD  10-15-24   No growth at 5 days  --  --    RADIOLOGY:  < from: CT Chest w/ IV Cont (11.06.24 @ 17:12) >  IMPRESSION:    CHEST:  *  Consolidative opacity in the left lower lobe, which may be due to   atelectasis. Superimposed infection is not excluded.  *  Small left and trace right pleural effusions, decreasedfrom the prior   CT.    ABDOMEN AND PELVIS:  *  Soft tissue infiltration overlying the lower sacrum, possibly related   to a decubitus ulcer. Correlate with physical exam. Associated erosive   changes to the lower sacrum, similar to the prior CT, possibly reflecting   osteomyelitis of uncertain chronicity. Consider further evaluation with   bony pelvis MRI as warranted.  *  Mild urinary bladder wall thickening, which may be secondary to   underdistention or cystitis. Correlate with urinalysis.  * Percutaneous gastrojejunostomy catheter terminating in the proximal   duodenum, with the tip retracted in position in comparison to the prior   CT exam.  *  Redemonstrated gastrocutaneous fistulous tract from the course of a   previous gastrostomy tube, which contains less gas in comparison to the   prior CT.    < end of copied text >      Zion Newman MD; Division of Infectious Disease; Pager: [phone]; nights and weekends: [phone]

## 2024-11-08 NOTE — PROGRESS NOTE ADULT - PROBLEM SELECTOR PLAN 1
- Patient with Chronic Trach at baseline   - Patient presented to ED with Hypoxemia in setting of PNA   - Mechanical vent: Volume ctrl 18/350/5/40%  - Continue Duoneb and Chest PT

## 2024-11-09 NOTE — PROGRESS NOTE ADULT - PROBLEM SELECTOR PLAN 2
[] PNA / Intermittent fevers  - Sputum cx 10/8: PSA  - CXR 10/7: Opacification of the right middle lobe may represent pneumonia versus atelectasis  - CT A/P 10/18: ? Multifocal pneumonia  - Completed Initial course of cefepime on 10/12  - Sputum 11/4: PSA  - Repeat CT C/A/P: Consolidative opacity LLL  - Cefepime restarted 11/5-->11/9; in setting of recurrent fevers  - Will dc Cefepime tomorrow and monitor temp curve for possible drug related fever    [] C.Diff   - Stool C.diff: + 11/1   - S/p Flagyl 11/3-11/8  - Continue enteral Vanco  - + RT remains in place; monitor output [] PNA / Intermittent fevers  - Sputum cx 10/8: PSA  - CXR 10/7: Opacification of the right middle lobe may represent pneumonia versus atelectasis  - CT A/P 10/18: ? Multifocal pneumonia  - Completed Initial course of cefepime on 10/12  - Sputum 11/4: PSA  - Repeat CT C/A/P: Consolidative opacity LLL  - Cefepime restarted 11/5-->11/9; in setting of recurrent fevers  - Will dc Cefepime 11/9 and monitor temp curve for possible drug related fever    [] C.Diff   - Stool C.diff: + 11/1   - S/p Flagyl 11/3-11/8  - Continue enteral Vanco  - + RT remains in place; monitor output

## 2024-11-09 NOTE — PROGRESS NOTE ADULT - ASSESSMENT
75 yo F PMH T2DM on insulin, HTN, HLD, hypothyroidism, RA on Prednisone, Fibromyalgia, cerebral aneurysm s/p repair, chronic hypercapnic respiratory failure s/p trach (vent dependent) and chronic PEG 2022, recurrent C. diff infection (follows with Dr. 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  Pt also noted to have rectal bleeding this past week requiring transfusion 5 days ago in ED as per daughter. Daughter also reports brownish discharge from G-J tube. In ED, pt afebrile, fiO2 96-98% on 40% on ventilator.  Chest X-ray w/ opacification of right lung concerning for possible PNA.  Admitted to RCU for further management. Sputum cxs grew PSA; treated with course of Cefepime. Patient with decreased H+H received 1 unit of PRBCS on 10/10.  10/14 EGD w/ Dr. Rosales for PEGJ exchange and clippings placed for closure of fistula site.  Persistent fevers treated with meropenem and vanc (d/c'd 10/20).  CT A/P without infectious source.  Continued fevers and persistent leukocytosis 11/1 CDiff positive - po vanco started, IV flagyl added 11/3. Head CT performed on 11/6 due to concern of lethargy no acute intracranial findings were noted; likely related to metabolic encephalopathy. Patient continued to have persistent fevers and was empirically treated with Cefepime For CR PSA in sputum cx11/5-11/9. CT C/A/P performed 11/6 c/w consolidative opacity LLL. Continue airway management with duonebs, chest PT and suction PRN.           11/8: No events reported overnight. Patient with low grade temp this afternoon 100.8 case d/w ID will dc Flagyl and Discontinue Cefepime tomorrow on 11/9. Will monitor off Cefepime tomorrow for possible drug fever. Will re-culture for temp > 101. Patient remains on enteral vanco.       75 yo F PMH T2DM on insulin, HTN, HLD, hypothyroidism, RA on Prednisone, Fibromyalgia, cerebral aneurysm s/p repair, chronic hypercapnic respiratory failure s/p trach (vent dependent) and chronic PEG 2022, recurrent C. diff infection (follows with Dr. 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  Pt also noted to have rectal bleeding this past week requiring transfusion 5 days ago in ED as per daughter. Daughter also reports brownish discharge from G-J tube. In ED, pt afebrile, fiO2 96-98% on 40% on ventilator.  Chest X-ray w/ opacification of right lung concerning for possible PNA.  Admitted to RCU for further management. Sputum cxs grew PSA; treated with course of Cefepime. Patient with decreased H+H received 1 unit of PRBCS on 10/10.  10/14 EGD w/ Dr. Rosales for PEGJ exchange and clippings placed for closure of fistula site.  Persistent fevers treated with meropenem and vanc (d/c'd 10/20).  CT A/P without infectious source.  Continued fevers and persistent leukocytosis 11/1 CDiff positive - po vanco started, IV flagyl added 11/3. Head CT performed on 11/6 due to concern of lethargy no acute intracranial findings were noted; likely related to metabolic encephalopathy. Patient continued to have persistent fevers and was empirically treated with Cefepime For CR PSA in sputum cx11/5-11/9. CT C/A/P performed 11/6 c/w consolidative opacity LLL. Continue airway management with duonebs, chest PT and suction PRN.           11/8: No events reported overnight. Patient with low grade temp this afternoon 100.8 case d/w ID will dc Flagyl and Discontinue Cefepime tomorrow on 11/9. Will monitor off Cefepime tomorrow for possible drug fever. Will re-culture for temp > 101. Patient remains on enteral vanco  11/9-no new events.       75 yo F PMH T2DM on insulin, HTN, HLD, hypothyroidism, RA on Prednisone, Fibromyalgia, cerebral aneurysm s/p repair, chronic hypercapnic respiratory failure s/p trach (vent dependent) and chronic PEG 2022, recurrent C. diff infection (follows with Dr. 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  Pt also noted to have rectal bleeding this past week requiring transfusion 5 days ago in ED as per daughter. Daughter also reports brownish discharge from G-J tube. In ED, pt afebrile, fiO2 96-98% on 40% on ventilator.  Chest X-ray w/ opacification of right lung concerning for possible PNA.  Admitted to RCU for further management. Sputum cxs grew PSA; treated with course of Cefepime. Patient with decreased H+H received 1 unit of PRBCS on 10/10.  10/14 EGD w/ Dr. Rosales for PEGJ exchange and clippings placed for closure of fistula site.  Persistent fevers treated with meropenem and vanc (d/c'd 10/20).  CT A/P without infectious source.  Continued fevers and persistent leukocytosis 11/1 CDiff positive - po vanco started, IV flagyl added 11/3. Head CT performed on 11/6 due to concern of lethargy no acute intracranial findings were noted; likely related to metabolic encephalopathy. Patient continued to have persistent fevers and was empirically treated with Cefepime For CR PSA in sputum cx11/5-11/9. CT C/A/P performed 11/6 c/w consolidative opacity LLL. Continue airway management with duonebs, chest PT and suction PRN.           11/8: No events reported overnight. Patient with low grade temp this afternoon 100.8 case d/w ID will dc Flagyl and Discontinue Cefepime tomorrow on 11/9. Will monitor off Cefepime tomorrow for possible drug fever. Will re-culture for temp > 101. Patient remains on enteral vanco  11/9-no new events.  FEver 101.7F this morning in presence of Cefepime and oral vancomcyin.  Mild leukocytosis relatively unchanged.  Will continue to monitor as patient remains hemodynamically stable. Cefepime to end today.   73 yo F PMH T2DM on insulin, HTN, HLD, hypothyroidism, RA on Prednisone, Fibromyalgia, cerebral aneurysm s/p repair, chronic hypercapnic respiratory failure s/p trach (vent dependent) and chronic PEG 2022, recurrent C. diff infection (follows with Dr. 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  Pt also noted to have rectal bleeding this past week requiring transfusion 5 days ago in ED as per daughter. Daughter also reports brownish discharge from G-J tube. In ED, pt afebrile, fiO2 96-98% on 40% on ventilator.  Chest X-ray w/ opacification of right lung concerning for possible PNA.  Admitted to RCU for further management. Sputum cxs grew PSA; treated with course of Cefepime. Patient with decreased H+H received 1 unit of PRBCS on 10/10.  10/14 EGD w/ Dr. Rosales for PEGJ exchange and clippings placed for closure of fistula site.  Persistent fevers treated with meropenem and vanc (d/c'd 10/20).  CT A/P without infectious source.  Continued fevers and persistent leukocytosis 11/1 CDiff positive - po vanco started, IV flagyl added 11/3. Head CT performed on 11/6 due to concern of lethargy no acute intracranial findings were noted; likely related to metabolic encephalopathy. Patient continued to have persistent fevers and was empirically treated with Cefepime For CR PSA in sputum cx11/5-11/9. CT C/A/P performed 11/6 c/w consolidative opacity LLL. Continue airway management with duonebs, chest PT and suction PRN.           11/8: No events reported overnight. Patient with low grade temp this afternoon 100.8 case d/w ID will dc Flagyl and Discontinue Cefepime tomorrow on 11/9. Will monitor off Cefepime tomorrow for possible drug fever. Will re-culture for temp > 101. Patient remains on enteral vanco  11/9-no new events.  Fever 101.7F this morning in presence of Cefepime and oral vancomcyin.  Mild leukocytosis relatively unchanged.  Will continue to monitor as patient remains hemodynamically stable. Cefepime to end today.  There is concern that there may be reflux of J-tube contents into the stomach which can be seen draining into the bag.  This occurred while patient was turned on right side so it may be positional.  A tube study request was placed to evaluate for J-tube migration.

## 2024-11-09 NOTE — PROGRESS NOTE ADULT - PROBLEM SELECTOR PLAN 11
Dr Novak - Patient with Sacral wound prior to admission  - Continue local wound care  - frequent turning and repositioning

## 2024-11-09 NOTE — CHART NOTE - NSCHARTNOTEFT_GEN_A_CORE
POCT Blood Glucose:  240 mg/dL (11-09-24 @ 11:45)  103 mg/dL (11-09-24 @ 05:16)  199 mg/dL (11-08-24 @ 23:36)  248 mg/dL (11-08-24 @ 17:33)      eMAR:  insulin glargine Injectable (LANTUS)   12 Unit(s) SubCutaneous (11-09-24 @ 06:06)    insulin lispro (ADMELOG) corrective regimen sliding scale   1 Unit(s) SubCutaneous (11-09-24 @ 12:34)   1 Unit(s) SubCutaneous (11-08-24 @ 18:06)    insulin lispro Injectable (ADMELOG)   40 Unit(s) SubCutaneous (11-09-24 @ 12:34)    insulin lispro Injectable (ADMELOG)   24 Unit(s) SubCutaneous (11-09-24 @ 06:08)    levothyroxine   125 MICROGram(s) Oral (11-09-24 @ 04:03)    predniSONE  Solution   5 milliGRAM(s) Enteral Tube (11-09-24 @ 09:20)    Diet, NPO with Tube Feed:   Tube Feeding Modality: Jejunostomy  TranZfinity Peptide 1.5 (KFPEPT1.5RTH)  Total Volume for 24 Hours (mL): 960  Continuous  Until Goal Tube Feed Rate (mL per Hour): 80  Tube Feed Duration (in Hours): 12  Tube Feed Start Time: 06:00  Free Water Flush  Pump   Rate (mL per Hour): 50   Frequency: Every Hour  Free Water Flush Instructions:  50ml free water flushes Q1hr  Alfred(7 Gm Arginine/7 Gm Glut/1.2 Gm HMB     Qty per Day:  2  No Carb Prosource (1pkg = 15gms Protein)     Qty per Day:  1  Banatrol TF     Qty per Day:  1/2 packet per daily (11-01-24 @ 08:28) [Active]      BGs and insulin regimen and diet reviewed   Remains on 12 hour tube feeding (TranZfinity Peptide 1.5 (KFPEPT1.5RTH) at 80 ml/hr)   6 am BG at goal on current Lantus dose and BG at 12 noon remain above goal in 200s    - Will slightly increase 6 am Admelog dose to 26 units   - please keep hypoglycemia protocol in place       Contact via [employer] Teams during business hours  To reach covering provider access [employer] via sun[employer] tools  For Urgent matters/after-hours/weekends/holidays please page endocrine fellow on call   For nonurgent matters please email [email or web address]    Please note that this patient may be followed by different provider tomorrow.  Notify endocrine 24 hours prior to discharge for final recommendations

## 2024-11-09 NOTE — PROGRESS NOTE ADULT - NS ATTEND AMEND GEN_ALL_CORE FT
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 Found to have Pseudomonas aeruginosa in sputum culture and urine culture with ESBL E. coli s/p course of meropenem. Course complicated by antibiotic-associated diarrhea.          - C/W current pain regiment, well controlled. C/W escitalopram and seroquel  - pt remains lethargic  but episodically back to baseline, likely septic encephalopathy  - CTH w/o acute changes  - continue lung protective ventilation. Does poorly on PS trials. Continue Duonebs.   - cont tx for cdiff, f/u ID reccs (prior cx's w/ PsA sputum and esbl ecoli uti)  - poss PNA on CT, cefepime for now, f/u ID reccs poss stop abx tomorrow  - monitor BM frequency, volume improved past 24h  - Anemia multifactorial, hemorrhoids, AOCD. s/p EGD without active bleeding, f/u Heme reccs  - cont to monitor FS    -  RA, on home prednisone 5 mg daily   - monitor hypoNa, tsh normal   - cont wound care (and f/u team reccs) to sacrum for decub (also likely moisture related rash areas on back)   - DVT ppx- only scd for now given significant anemia a few days ago 74F PMH DM2 (insulin-dependent), HTN, HLD, hypothyroidism, RA on Prednisone, fibromyalgia, cerebral aneurysm s/p repair, chronic hypoxemia and hypercapnic respiratory failure s/p trach, vent-dependent, recurrent C diff infection, oropharyngeal dysphagia s/p G-J tube with persistent GJ tube leaks now s/p GC fistula repair 8/12, and recent presentation 5 days PTA for rectal bleeding requiring transfusion in the ED who now presents with acute hypoxemic respiratory distress 2/2 RML PNA, admitted to the RCU for further management. Found to have Pseudomonas aeruginosa in sputum culture and urine culture with ESBL E. coli s/p course of meropenem. Course complicated by antibiotic-associated diarrhea.     PAIN/neuro- C/W current pain regiment, well controlled. C/W escitalopram and seroquel CTH w/o acute changes  Resp- continue lung protective ventilation. Does poorly on PS trials. Continue Duonebs.   ID- cont tx for cdiff, f/u ID reccs (prior cx's w/ PsA sputum and esbl ecoli uti) ?PNA on CT, cefepime for now, f/u ID reccs poss DC 11/9  GI- monitor BM frequency, volume improved past 24h  Heme- Anemia multifactorial, hemorrhoids, AOCD. s/p EGD without active bleeding, f/u Heme reccs  DM- cont to monitor FS    Rheum-  RA, on home prednisone 5 mg daily   FEN- monitor hypoNa, tsh normal   Wound- cont wound care (and f/u team reccs) to sacrum for decub (also likely moisture related rash areas on back)   - DVT ppx- only scd for now given significant anemia a few days ago  DC planning to home    Cesar Hernandez MD-Pulmonary   [phone]

## 2024-11-09 NOTE — PROGRESS NOTE ADULT - SUBJECTIVE AND OBJECTIVE BOX
Patient is a 74y old  Female who presents with a chief complaint of Patient admitted with Hypoxemia and episode of Bright red blood per rectum (07 Nov 2024 18:20)      Interval Events: No events reported overnight     REVIEW OF SYSTEMS:  [ ] Positive  [ ] All other systems negative  [x] Unable to assess ROS because not answering verbal questioning     Vital Signs Last 24 Hrs  T(C): 37.1 (11-08-24 @ 04:49), Max: 37.6 (11-07-24 @ 17:14)  T(F): 98.7 (11-08-24 @ 04:49), Max: 99.7 (11-07-24 @ 17:14)  HR: 89 (11-08-24 @ 05:55) (89 - 113)  BP: 125/60 (11-08-24 @ 04:49) (115/51 - 144/99)  RR: 18 (11-08-24 @ 04:49) (17 - 18)  SpO2: 100% (11-08-24 @ 05:55) (98% - 100%)    PHYSICAL EXAM:  HEENT:   [X] Tracheostomy: #8 XLT cuffed shiley   [X] Pupils equal  [ ] No oral lesions  [ ] Abnormal    SKIN  [ ] No Rash  [ ] Abnormal  [X] pressure - sacral unstageable    CARDIAC  [X] Regular  [ ] Abnormal    PULMONARY  [ ] Bilateral Clear Breath Sounds  [ ] Normal Excursion  [X] Abnormal - mild coarse BS     GI  [X] PEG      [X] +BS		              [X] Soft, nondistended, nontender	  [X] Abnormal - old PEG site c/d/i, + rectal tube     MUSCULOSKELETAL                                   [X] Bedbound                 [ ] Abnormal    [ ] Ambulatory/OOB to chair                           EXTREMITIES                                         [ ] Normal  [X] Edema - mild generalized pitting edema                          NEUROLOGIC  [ ] Normal, non focal  [X] Focal findings: lethargic but following commands    PSYCHIATRIC  [x] Easily arousable, lethargic  [ ] Sedated	 [ ]Agitated    :  Mcbride: [ ] Yes, if yes: Date of Placement:                   [X] No    LINES: Central Lines [ ] Yes, if yes: Date of Placement                                     [X] No    HOSPITAL MEDICATIONS:  MEDICATIONS  (STANDING):  albuterol/ipratropium for Nebulization 3 milliLiter(s) Nebulizer every 6 hours  AQUAPHOR (petrolatum Ointment) 1 Application(s) Topical once  artificial  tears Solution 1 Drop(s) Both EYES every 6 hours  ascorbic acid 500 milliGRAM(s) Oral daily  Biotene Dry Mouth Oral Rinse 5 milliLiter(s) Swish and Spit every 6 hours  calcium carbonate 1250 mG  + Vitamin D (OsCal 500 + D) 1 Tablet(s) Oral <User Schedule>  cefepime   IVPB 2000 milliGRAM(s) IV Intermittent every 8 hours  chlorhexidine 0.12% Liquid 15 milliLiter(s) Oral Mucosa every 12 hours  chlorhexidine 4% Liquid 1 Application(s) Topical daily  dextrose 5%. 1000 milliLiter(s) (100 mL/Hr) IV Continuous <Continuous>  dextrose 5%. 1000 milliLiter(s) (50 mL/Hr) IV Continuous <Continuous>  dextrose 5%. 1000 milliLiter(s) (100 mL/Hr) IV Continuous <Continuous>  dextrose 5%. 1000 milliLiter(s) (50 mL/Hr) IV Continuous <Continuous>  dextrose 50% Injectable 25 Gram(s) IV Push once  dextrose 50% Injectable 25 Gram(s) IV Push once  dextrose 50% Injectable 12.5 Gram(s) IV Push once  diclofenac sodium 1% Gel 2 Gram(s) Topical every 6 hours  escitalopram 10 milliGRAM(s) Oral <User Schedule>  fentaNYL   Patch  12 MICROgram(s)/Hr 1 Patch Transdermal every 72 hours  fentaNYL   Patch  25 MICROgram(s)/Hr 1 Patch Transdermal every 72 hours  gabapentin Solution 250 milliGRAM(s) Oral <User Schedule>  glucagon  Injectable 1 milliGRAM(s) IntraMuscular once  glucagon  Injectable 1 milliGRAM(s) IntraMuscular once  hemorrhoidal Ointment 1 Application(s) Rectal at bedtime  influenza  Vaccine (HIGH DOSE) 0.5 milliLiter(s) IntraMuscular once  insulin glargine Injectable (LANTUS) 12 Unit(s) SubCutaneous <User Schedule>  insulin lispro (ADMELOG) corrective regimen sliding scale   SubCutaneous every 6 hours  insulin lispro Injectable (ADMELOG) 38 Unit(s) SubCutaneous <User Schedule>  insulin lispro Injectable (ADMELOG) 22 Unit(s) SubCutaneous <User Schedule>  lactobacillus acidophilus 1 Tablet(s) Oral <User Schedule>  levothyroxine 125 MICROGram(s) Oral <User Schedule>  lidocaine   4% Patch 4 Patch Transdermal every 24 hours  melatonin 12 milliGRAM(s) Oral <User Schedule>  metroNIDAZOLE  IVPB 500 milliGRAM(s) IV Intermittent every 8 hours  nystatin Powder 1 Application(s) Topical every 8 hours  pantoprazole  Injectable 40 milliGRAM(s) IV Push every 12 hours  predniSONE  Solution 5 milliGRAM(s) Enteral Tube <User Schedule>  QUEtiapine 12.5 milliGRAM(s) Oral <User Schedule>  sodium chloride 1 Gram(s) Oral every 8 hours  sodium chloride 0.65% Nasal 1 Spray(s) Both Nostrils every 6 hours  vancomycin    Solution 125 milliGRAM(s) Oral every 6 hours  witch hazel Pads 1 Application(s) Topical every 12 hours    MEDICATIONS  (PRN):  acetaminophen   Oral Liquid .. 650 milliGRAM(s) Oral every 6 hours PRN Temp greater or equal to 38C (100.4F), Mild Pain (1 - 3)  aluminum hydroxide/magnesium hydroxide/simethicone Suspension 30 milliLiter(s) Enteral Tube every 4 hours PRN Dyspepsia  oxyCODONE    Solution 10 milliGRAM(s) Oral every 6 hours PRN Severe Pain (7 - 10)  oxyCODONE    Solution 2.5 milliGRAM(s) Oral every 6 hours PRN Mild Pain (1 - 3)  oxyCODONE    Solution 5 milliGRAM(s) Oral every 6 hours PRN Moderate Pain (4 - 6)      LABS:                        7.1    10.73 )-----------( 176      ( 08 Nov 2024 06:51 )             23.9     11-07    130[L]  |  101  |  18  ----------------------------<  93  4.3   |  19[L]  |  <0.30[L]    Ca    7.8[L]      07 Nov 2024 06:48  Phos  3.3     11-07  Mg     1.9     11-07    TPro  5.8[L]  /  Alb  2.1[L]  /  TBili  0.4  /  DBili  x   /  AST  25  /  ALT  18  /  AlkPhos  119  11-07      Urinalysis Basic - ( 07 Nov 2024 06:48 )    Color: x / Appearance: x / SG: x / pH: x  Gluc: 93 mg/dL / Ketone: x  / Bili: x / Urobili: x   Blood: x / Protein: x / Nitrite: x   Leuk Esterase: x / RBC: x / WBC x   Sq Epi: x / Non Sq Epi: x / Bacteria: x      Arterial Blood Gas:  11-06 @ 14:05  7.37/32/209/18/100.0/-6.1  ABG lactate: --      CAPILLARY BLOOD GLUCOSE    MICROBIOLOGY:     RADIOLOGY:  [ ] Reviewed and interpreted by me    Mode: AC/ CMV (Assist Control/ Continuous Mandatory Ventilation)  RR (machine): 18  TV (machine): 350  FiO2: 30  PEEP: 5  ITime: 1  MAP: 10  PIP: 28   Patient is a 74y old  Female who presents with a chief complaint of Patient admitted with Hypoxemia and episode of Bright red blood per rectum (07 Nov 2024 18:20)      Interval Events: No events reported overnight     REVIEW OF SYSTEMS:  [ ] Positive  [ ] All other systems negative  [x] Unable to assess ROS because not answering verbal questioning     Vital Signs Last 24 Hrs  T(C): 37.5 (09 Nov 2024 11:47), Max: 38.7 (08 Nov 2024 12:56)  T(F): 99.5 (09 Nov 2024 11:47), Max: 101.7 (08 Nov 2024 12:56)  HR: 96 (09 Nov 2024 11:27) (79 - 118)  BP: 136/63 (09 Nov 2024 11:47) (110/57 - 167/80)  BP(mean): --  RR: 23 (09 Nov 2024 09:41) (18 - 23)  SpO2: 100% (09 Nov 2024 11:47) (99% - 100%)    Parameters below as of 09 Nov 2024 11:47  Patient On (Oxygen Delivery Method): ventilator      PHYSICAL EXAM:  HEENT:   [X] Tracheostomy: #8 XLT cuffed Shiley   [X] PERRL B/L;   [ ] No oral lesions  [ ] Abnormal    SKIN  [ ] No Rash  [ ] Abnormal  [X] pressure - sacral unstageable    CARDIAC  [X] Regular  [ ] Abnormal    PULMONARY  [ ] Bilateral Clear Breath Sounds  [ ] Normal Excursion  [X] Abnormal - mild coarse BS     GI  [X] PEG      [X] +BS		              [X] Soft, nondistended, nontender	  [X] Abnormal - old PEG site c/d/i, + rectal tube     MUSCULOSKELETAL                                   [X] Bedbound                 [ ] Abnormal    [ ] Ambulatory/OOB to chair                           EXTREMITIES                                         [ ] Normal  [X] Edema - mild generalized pitting edema                          NEUROLOGIC  [ ] Normal, non focal  [X] Focal findings: lethargic but following commands    PSYCHIATRIC  [x] Easily arousable, lethargic  [ ] Sedated	 [ ]Agitated    :  Mcbride: [ ] Yes, if yes: Date of Placement:                   [X] No    LINES: Central Lines [ ] Yes, if yes: Date of Placement                                     [X] No    HOSPITAL MEDICATIONS:  MEDICATIONS  (STANDING):  albuterol/ipratropium for Nebulization 3 milliLiter(s) Nebulizer every 6 hours  AQUAPHOR (petrolatum Ointment) 1 Application(s) Topical once  artificial  tears Solution 1 Drop(s) Both EYES every 6 hours  ascorbic acid 500 milliGRAM(s) Oral daily  Biotene Dry Mouth Oral Rinse 5 milliLiter(s) Swish and Spit every 6 hours  calcium carbonate 1250 mG  + Vitamin D (OsCal 500 + D) 1 Tablet(s) Oral <User Schedule>  cefepime   IVPB 2000 milliGRAM(s) IV Intermittent every 8 hours  chlorhexidine 0.12% Liquid 15 milliLiter(s) Oral Mucosa every 12 hours  chlorhexidine 4% Liquid 1 Application(s) Topical daily  dextrose 5%. 1000 milliLiter(s) (100 mL/Hr) IV Continuous <Continuous>  dextrose 5%. 1000 milliLiter(s) (50 mL/Hr) IV Continuous <Continuous>  dextrose 5%. 1000 milliLiter(s) (100 mL/Hr) IV Continuous <Continuous>  dextrose 5%. 1000 milliLiter(s) (50 mL/Hr) IV Continuous <Continuous>  dextrose 50% Injectable 25 Gram(s) IV Push once  dextrose 50% Injectable 25 Gram(s) IV Push once  dextrose 50% Injectable 12.5 Gram(s) IV Push once  diclofenac sodium 1% Gel 2 Gram(s) Topical every 6 hours  escitalopram 10 milliGRAM(s) Oral <User Schedule>  fentaNYL   Patch  12 MICROgram(s)/Hr 1 Patch Transdermal every 72 hours  fentaNYL   Patch  25 MICROgram(s)/Hr 1 Patch Transdermal every 72 hours  gabapentin Solution 250 milliGRAM(s) Oral <User Schedule>  glucagon  Injectable 1 milliGRAM(s) IntraMuscular once  glucagon  Injectable 1 milliGRAM(s) IntraMuscular once  hemorrhoidal Ointment 1 Application(s) Rectal at bedtime  influenza  Vaccine (HIGH DOSE) 0.5 milliLiter(s) IntraMuscular once  insulin glargine Injectable (LANTUS) 12 Unit(s) SubCutaneous <User Schedule>  insulin lispro (ADMELOG) corrective regimen sliding scale   SubCutaneous every 6 hours  insulin lispro Injectable (ADMELOG) 38 Unit(s) SubCutaneous <User Schedule>  insulin lispro Injectable (ADMELOG) 22 Unit(s) SubCutaneous <User Schedule>  lactobacillus acidophilus 1 Tablet(s) Oral <User Schedule>  levothyroxine 125 MICROGram(s) Oral <User Schedule>  lidocaine   4% Patch 4 Patch Transdermal every 24 hours  melatonin 12 milliGRAM(s) Oral <User Schedule>  metroNIDAZOLE  IVPB 500 milliGRAM(s) IV Intermittent every 8 hours  nystatin Powder 1 Application(s) Topical every 8 hours  pantoprazole  Injectable 40 milliGRAM(s) IV Push every 12 hours  predniSONE  Solution 5 milliGRAM(s) Enteral Tube <User Schedule>  QUEtiapine 12.5 milliGRAM(s) Oral <User Schedule>  sodium chloride 1 Gram(s) Oral every 8 hours  sodium chloride 0.65% Nasal 1 Spray(s) Both Nostrils every 6 hours  vancomycin    Solution 125 milliGRAM(s) Oral every 6 hours  witch hazel Pads 1 Application(s) Topical every 12 hours    MEDICATIONS  (PRN):  acetaminophen   Oral Liquid .. 650 milliGRAM(s) Oral every 6 hours PRN Temp greater or equal to 38C (100.4F), Mild Pain (1 - 3)  aluminum hydroxide/magnesium hydroxide/simethicone Suspension 30 milliLiter(s) Enteral Tube every 4 hours PRN Dyspepsia  oxyCODONE    Solution 10 milliGRAM(s) Oral every 6 hours PRN Severe Pain (7 - 10)  oxyCODONE    Solution 2.5 milliGRAM(s) Oral every 6 hours PRN Mild Pain (1 - 3)  oxyCODONE    Solution 5 milliGRAM(s) Oral every 6 hours PRN Moderate Pain (4 - 6)      LABS:                        7.1    10.73 )-----------( 176      ( 08 Nov 2024 06:51 )             23.9     11-07    130[L]  |  101  |  18  ----------------------------<  93  4.3   |  19[L]  |  <0.30[L]    Ca    7.8[L]      07 Nov 2024 06:48  Phos  3.3     11-07  Mg     1.9     11-07    TPro  5.8[L]  /  Alb  2.1[L]  /  TBili  0.4  /  DBili  x   /  AST  25  /  ALT  18  /  AlkPhos  119  11-07      Urinalysis Basic - ( 07 Nov 2024 06:48 )    Color: x / Appearance: x / SG: x / pH: x  Gluc: 93 mg/dL / Ketone: x  / Bili: x / Urobili: x   Blood: x / Protein: x / Nitrite: x   Leuk Esterase: x / RBC: x / WBC x   Sq Epi: x / Non Sq Epi: x / Bacteria: x      Arterial Blood Gas:  11-06 @ 14:05  7.37/32/209/18/100.0/-6.1  ABG lactate: --      CAPILLARY BLOOD GLUCOSE    MICROBIOLOGY:     RADIOLOGY:  [ ] Reviewed and interpreted by me    Mode: AC/ CMV (Assist Control/ Continuous Mandatory Ventilation)  RR (machine): 18  TV (machine): 350  FiO2: 30  PEEP: 5  ITime: 1  MAP: 10  PIP: 28

## 2024-11-10 NOTE — PROGRESS NOTE ADULT - ASSESSMENT
75 yo F PMH T2DM on insulin, HTN, HLD, hypothyroidism, RA on Prednisone, Fibromyalgia, cerebral aneurysm s/p repair, chronic hypercapnic respiratory failure s/p trach (vent dependent) and chronic PEG 2022, recurrent C. diff infection (follows with Dr. 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  Pt also noted to have rectal bleeding this past week requiring transfusion 5 days ago in ED as per daughter. Daughter also reports brownish discharge from G-J tube. In ED, pt afebrile, fiO2 96-98% on 40% on ventilator.  Chest X-ray w/ opacification of right lung concerning for possible PNA.  Admitted to RCU for further management. Sputum cxs grew PSA; treated with course of Cefepime. Patient with decreased H+H received 1 unit of PRBCS on 10/10.  10/14 EGD w/ Dr. Rosales for PEGJ exchange and clippings placed for closure of fistula site.  Persistent fevers treated with meropenem and vanc (d/c'd 10/20).  CT A/P without infectious source.  Continued fevers and persistent leukocytosis 11/1 CDiff positive - po vanco started, IV flagyl added 11/3. Head CT performed on 11/6 due to concern of lethargy no acute intracranial findings were noted; likely related to metabolic encephalopathy. Patient continued to have persistent fevers and was empirically treated with Cefepime For CR PSA in sputum cx11/5-11/9. CT C/A/P performed 11/6 c/w consolidative opacity LLL. Continue airway management with duonebs, chest PT and suction PRN.           11/8: No events reported overnight. Patient with low grade temp this afternoon 100.8 case d/w ID will dc Flagyl and Discontinue Cefepime tomorrow on 11/9. Will monitor off Cefepime tomorrow for possible drug fever. Will re-culture for temp > 101. Patient remains on enteral vanco  11/9-no new events.  Fever 101.7F this morning in presence of Cefepime and oral vancomcyin.  Mild leukocytosis relatively unchanged.  Will continue to monitor as patient remains hemodynamically stable. Cefepime to end today.  There is concern that there may be reflux of J-tube contents into the stomach which can be seen draining into the bag.  This occurred while patient was turned on right side so it may be positional.  A tube study request was placed to evaluate for J-tube migration.   75 yo F PMH T2DM on insulin, HTN, HLD, hypothyroidism, RA on Prednisone, Fibromyalgia, cerebral aneurysm s/p repair, chronic hypercapnic respiratory failure s/p trach (vent dependent) and chronic PEG 2022, recurrent C. diff infection (follows with Dr. 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  Pt also noted to have rectal bleeding this past week requiring transfusion 5 days ago in ED as per daughter. Daughter also reports brownish discharge from G-J tube. In ED, pt afebrile, fiO2 96-98% on 40% on ventilator.  Chest X-ray w/ opacification of right lung concerning for possible PNA.  Admitted to RCU for further management. Sputum cxs grew PSA; treated with course of Cefepime. Patient with decreased H+H received 1 unit of PRBCS on 10/10.  10/14 EGD w/ Dr. Rosales for PEGJ exchange and clippings placed for closure of fistula site.  Persistent fevers treated with meropenem and vanc (d/c'd 10/20).  CT A/P without infectious source.  Continued fevers and persistent leukocytosis 11/1 CDiff positive - po vanco started, IV flagyl added 11/3. Head CT performed on 11/6 due to concern of lethargy no acute intracranial findings were noted; likely related to metabolic encephalopathy. Patient continued to have persistent fevers and was empirically treated with Cefepime For CR PSA in sputum cx11/5-11/9. CT C/A/P performed 11/6 c/w consolidative opacity LLL. Continue airway management with duonebs, chest PT and suction PRN.           11/8: No events reported overnight. Patient with low grade temp this afternoon 100.8 case d/w ID will dc Flagyl and Discontinue Cefepime tomorrow on 11/9. Will monitor off Cefepime tomorrow for possible drug fever. Will re-culture for temp > 101. Patient remains on enteral vanco  11/9-no new events.  Fever 101.7F this morning in presence of Cefepime and oral vancomcyin.  Mild leukocytosis relatively unchanged.  Will continue to monitor as patient remains hemodynamically stable. Cefepime to end today.  There is concern that there may be reflux of J-tube contents into the stomach which can be seen draining into the bag.  This occurred while patient was turned on right side so it may be positional.  A tube study request was placed to evaluate for J-tube migration  11/10-f/up tube study.   75 yo F PMH T2DM on insulin, HTN, HLD, hypothyroidism, RA on Prednisone, Fibromyalgia, cerebral aneurysm s/p repair, chronic hypercapnic respiratory failure s/p trach (vent dependent) and chronic PEG 2022, recurrent C. diff infection (follows with Dr. 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  Pt also noted to have rectal bleeding this past week requiring transfusion 5 days ago in ED as per daughter. Daughter also reports brownish discharge from G-J tube. In ED, pt afebrile, fiO2 96-98% on 40% on ventilator.  Chest X-ray w/ opacification of right lung concerning for possible PNA.  Admitted to RCU for further management. Sputum cxs grew PSA; treated with course of Cefepime. Patient with decreased H+H received 1 unit of PRBCS on 10/10.  10/14 EGD w/ Dr. Rosales for PEGJ exchange and clippings placed for closure of fistula site.  Persistent fevers treated with meropenem and vanc (d/c'd 10/20).  CT A/P without infectious source.  Continued fevers and persistent leukocytosis 11/1 CDiff positive - po vanco started, IV flagyl added (11/3-11/8). Head CT performed on 11/6 due to concern of lethargy no acute intracranial findings were noted; likely related to metabolic encephalopathy. Patient continued to have persistent fevers and was empirically treated with Cefepime (11/5-11/9) for CR PSA in sputum cx. CT C/A/P performed 11/6 c/w consolidative opacity LLL. Continue airway management with duonebs, chest PT and suction PRN. Febrile mor      11/8: No events reported overnight. Patient with low grade temp this afternoon 100.8 case d/w ID will dc Flagyl and Discontinue Cefepime tomorrow on 11/9. Will monitor off Cefepime tomorrow for possible drug fever. Will re-culture for temp > 101. Patient remains on enteral vanco  11/9-no new events.  Fever 101.7F this morning in presence of Cefepime and oral vancomcyin.  Mild leukocytosis relatively unchanged.  Will continue to monitor as patient remains hemodynamically stable. Cefepime to end today.  There is concern that there may be reflux of J-tube contents into the stomach which can be seen draining into the bag.  This occurred while patient was turned on right side so it may be positional.  A tube study request was placed to evaluate for J-tube migration  11/10-f/up tube study.   73 yo F PMH T2DM on insulin, HTN, HLD, hypothyroidism, RA on Prednisone, Fibromyalgia, cerebral aneurysm s/p repair, chronic hypercapnic respiratory failure s/p trach (vent dependent) and chronic PEG 2022, recurrent C. diff infection (follows with Dr. 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  Pt also noted to have rectal bleeding this past week requiring transfusion 5 days ago in ED as per daughter. Daughter also reports brownish discharge from G-J tube. In ED, pt afebrile, fiO2 96-98% on 40% on ventilator.  Chest X-ray w/ opacification of right lung concerning for possible PNA.  Admitted to RCU for further management. Sputum cxs grew PSA; treated with course of Cefepime. Patient with decreased H+H received 1 unit of PRBCS on 10/10.  10/14 EGD w/ Dr. Rosales for PEGJ exchange and clippings placed for closure of fistula site.  Persistent fevers treated with meropenem and vanc (d/c'd 10/20).  CT A/P without infectious source.  Continued fevers and persistent leukocytosis 11/1 CDiff positive - po vanco started, IV flagyl added (11/3-11/8). Head CT performed on 11/6 due to concern of lethargy no acute intracranial findings were noted; likely related to metabolic encephalopathy. Patient continued to have persistent fevers and was empirically treated with Cefepime (11/5-11/9) for CR PSA in sputum cx. CT C/A/P performed 11/6 c/w consolidative opacity LLL. Continue airway management with duonebs, chest PT and suction PRN. Febrile morning 11/10 -- ofirmev given, pan cultures sent. Tube study requested to evaluate for J-tube migration.

## 2024-11-10 NOTE — PROGRESS NOTE ADULT - SUBJECTIVE AND OBJECTIVE BOX
Patient is a 74y old  Female who presents with a chief complaint of Patient admitted with Hypoxemia and episode of Bright red blood per rectum (09 Nov 2024 07:08)      Interval Events:    REVIEW OF SYSTEMS:  [ ] Positive  [ ] All other systems negative  [ ] Unable to assess ROS because ________    Vital Signs Last 24 Hrs  T(C): 36.7 (11-10-24 @ 05:15), Max: 38.7 (11-09-24 @ 09:41)  T(F): 98 (11-10-24 @ 05:15), Max: 101.7 (11-09-24 @ 09:41)  HR: 92 (11-10-24 @ 06:22) (89 - 118)  BP: 151/70 (11-10-24 @ 05:15) (110/57 - 151/70)  RR: 18 (11-10-24 @ 05:15) (18 - 23)  SpO2: 100% (11-10-24 @ 06:22) (99% - 100%)    PHYSICAL EXAM:  HEENT:   [ ]Tracheostomy:  [ ]Pupils equal  [ ]No oral lesions  [ ]Abnormal    SKIN  [ ]No Rash  [ ] Abnormal  [ ] pressure    CARDIAC  [ ]Regular  [ ]Abnormal    PULMONARY  [ ]Bilateral Clear Breath Sounds  [ ]Normal Excursion  [ ]Abnormal    GI  [ ]PEG      [ ] +BS		              [ ]Soft, nondistended, nontender	  [ ]Abnormal    MUSCULOSKELETAL                                   [ ]Bedbound                 [ ]Abnormal    [ ]Ambulatory/OOB to chair                           EXTREMITIES                                         [ ]Normal  [ ]Edema                           NEUROLOGIC  [ ] Normal, non focal  [ ] Focal findings:    PSYCHIATRIC  [ ]Alert and appropriate  [ ] Sedated	 [ ]Agitated    :  Mcbride: [ ] Yes, if yes: Date of Placement:                   [  ] No    LINES: Central Lines [ ] Yes, if yes: Date of Placement                                     [  ] No    HOSPITAL MEDICATIONS:  MEDICATIONS  (STANDING):  albuterol/ipratropium for Nebulization 3 milliLiter(s) Nebulizer every 6 hours  AQUAPHOR (petrolatum Ointment) 1 Application(s) Topical once  artificial  tears Solution 1 Drop(s) Both EYES every 6 hours  ascorbic acid 500 milliGRAM(s) Oral daily  Biotene Dry Mouth Oral Rinse 5 milliLiter(s) Swish and Spit every 6 hours  calcium carbonate 1250 mG  + Vitamin D (OsCal 500 + D) 1 Tablet(s) Oral <User Schedule>  cefepime   IVPB 2000 milliGRAM(s) IV Intermittent every 8 hours  chlorhexidine 0.12% Liquid 15 milliLiter(s) Oral Mucosa every 12 hours  chlorhexidine 4% Liquid 1 Application(s) Topical daily  dextrose 5%. 1000 milliLiter(s) (50 mL/Hr) IV Continuous <Continuous>  dextrose 5%. 1000 milliLiter(s) (100 mL/Hr) IV Continuous <Continuous>  dextrose 5%. 1000 milliLiter(s) (50 mL/Hr) IV Continuous <Continuous>  dextrose 5%. 1000 milliLiter(s) (100 mL/Hr) IV Continuous <Continuous>  dextrose 50% Injectable 25 Gram(s) IV Push once  dextrose 50% Injectable 12.5 Gram(s) IV Push once  dextrose 50% Injectable 25 Gram(s) IV Push once  diclofenac sodium 1% Gel 2 Gram(s) Topical every 6 hours  escitalopram 10 milliGRAM(s) Oral <User Schedule>  fentaNYL   Patch  12 MICROgram(s)/Hr 1 Patch Transdermal every 72 hours  fentaNYL   Patch  25 MICROgram(s)/Hr 1 Patch Transdermal every 72 hours  gabapentin Solution 250 milliGRAM(s) Oral <User Schedule>  glucagon  Injectable 1 milliGRAM(s) IntraMuscular once  glucagon  Injectable 1 milliGRAM(s) IntraMuscular once  hemorrhoidal Ointment 1 Application(s) Rectal at bedtime  influenza  Vaccine (HIGH DOSE) 0.5 milliLiter(s) IntraMuscular once  insulin glargine Injectable (LANTUS) 10 Unit(s) SubCutaneous <User Schedule>  insulin lispro (ADMELOG) corrective regimen sliding scale   SubCutaneous every 6 hours  insulin lispro Injectable (ADMELOG) 40 Unit(s) SubCutaneous <User Schedule>  insulin lispro Injectable (ADMELOG) 26 Unit(s) SubCutaneous <User Schedule>  lactobacillus acidophilus 1 Tablet(s) Oral <User Schedule>  levothyroxine 125 MICROGram(s) Oral <User Schedule>  lidocaine   4% Patch 4 Patch Transdermal every 24 hours  melatonin 12 milliGRAM(s) Oral <User Schedule>  nystatin Powder 1 Application(s) Topical every 8 hours  pantoprazole  Injectable 40 milliGRAM(s) IV Push every 12 hours  predniSONE  Solution 5 milliGRAM(s) Enteral Tube <User Schedule>  QUEtiapine 12.5 milliGRAM(s) Oral <User Schedule>  sodium chloride 1 Gram(s) Oral every 8 hours  sodium chloride 0.65% Nasal 1 Spray(s) Both Nostrils every 6 hours  vancomycin    Solution 125 milliGRAM(s) Oral every 6 hours  witch hazel Pads 1 Application(s) Topical every 12 hours    MEDICATIONS  (PRN):  acetaminophen   Oral Liquid .. 650 milliGRAM(s) Oral every 6 hours PRN Temp greater or equal to 38C (100.4F), Mild Pain (1 - 3)  aluminum hydroxide/magnesium hydroxide/simethicone Suspension 30 milliLiter(s) Enteral Tube every 4 hours PRN Dyspepsia  oxyCODONE    Solution 5 milliGRAM(s) Oral every 6 hours PRN Moderate Pain (4 - 6)  oxyCODONE    Solution 10 milliGRAM(s) Oral every 6 hours PRN Severe Pain (7 - 10)  oxyCODONE    Solution 2.5 milliGRAM(s) Oral every 6 hours PRN Mild Pain (1 - 3)      LABS:                        7.8    12.79 )-----------( 203      ( 09 Nov 2024 06:39 )             27.1     11-09    133[L]  |  102  |  20  ----------------------------<  106[H]  4.1   |  19[L]  |  <0.30[L]    Ca    8.4      09 Nov 2024 06:40  Phos  2.4     11-09  Mg     1.8     11-09    TPro  6.2  /  Alb  2.1[L]  /  TBili  0.5  /  DBili  x   /  AST  28  /  ALT  17  /  AlkPhos  165[H]  11-09      Urinalysis Basic - ( 09 Nov 2024 06:40 )    Color: x / Appearance: x / SG: x / pH: x  Gluc: 106 mg/dL / Ketone: x  / Bili: x / Urobili: x   Blood: x / Protein: x / Nitrite: x   Leuk Esterase: x / RBC: x / WBC x   Sq Epi: x / Non Sq Epi: x / Bacteria: x          CAPILLARY BLOOD GLUCOSE    MICROBIOLOGY:     RADIOLOGY:  [ ] Reviewed and interpreted by me    Mode: AC/ CMV (Assist Control/ Continuous Mandatory Ventilation)  RR (machine): 18  TV (machine): 350  FiO2: 30  PEEP: 5  ITime: 1  MAP: 12  PIP: 38   Patient is a 74y old  Female who presents with a chief complaint of Patient admitted with Hypoxemia and episode of Bright red blood per rectum (09 Nov 2024 07:08)      Interval Events: Febrile morning of 11/10 -- ofirmev given.    REVIEW OF SYSTEMS:  [ ] Positive  [ ] All other systems negative  [ ] Unable to assess ROS because ________    Vital Signs Last 24 Hrs  T(C): 36.7 (11-10-24 @ 05:15), Max: 38.7 (11-09-24 @ 09:41)  T(F): 98 (11-10-24 @ 05:15), Max: 101.7 (11-09-24 @ 09:41)  HR: 92 (11-10-24 @ 06:22) (89 - 118)  BP: 151/70 (11-10-24 @ 05:15) (110/57 - 151/70)  RR: 18 (11-10-24 @ 05:15) (18 - 23)  SpO2: 100% (11-10-24 @ 06:22) (99% - 100%)      PHYSICAL EXAM:  HEENT:   [X] Tracheostomy: #8 XLT cuffed Shiley   [X] PERRL B/L  [ ] No oral lesions  [ ] Abnormal    SKIN  [ ] No Rash  [ ] Abnormal  [X] pressure - sacral unstageable    CARDIAC  [X] Regular  [ ] Abnormal    PULMONARY  [ ] Bilateral Clear Breath Sounds  [ ] Normal Excursion  [X] Abnormal - mild coarse BS     GI  [X] PEG      [X] +BS		              [X] Soft, nondistended, nontender	  [X] Abnormal - old PEG site c/d/i, + rectal tube     MUSCULOSKELETAL                                   [X] Bedbound                 [ ] Abnormal    [ ] Ambulatory/OOB to chair                           EXTREMITIES                                         [ ] Normal  [X] Edema - mild generalized pitting edema                          NEUROLOGIC  [ ] Normal, non focal  [X] Focal findings: lethargic but following commands    PSYCHIATRIC  [x] Easily arousable, lethargic  [ ] Sedated	 [ ]Agitated    :  Mcbride: [ ] Yes, if yes: Date of Placement:                   [X] No    LINES: Central Lines [ ] Yes, if yes: Date of Placement                                     [X] No        HOSPITAL MEDICATIONS:  MEDICATIONS  (STANDING):  albuterol/ipratropium for Nebulization 3 milliLiter(s) Nebulizer every 6 hours  AQUAPHOR (petrolatum Ointment) 1 Application(s) Topical once  artificial  tears Solution 1 Drop(s) Both EYES every 6 hours  ascorbic acid 500 milliGRAM(s) Oral daily  Biotene Dry Mouth Oral Rinse 5 milliLiter(s) Swish and Spit every 6 hours  calcium carbonate 1250 mG  + Vitamin D (OsCal 500 + D) 1 Tablet(s) Oral <User Schedule>  cefepime   IVPB 2000 milliGRAM(s) IV Intermittent every 8 hours  chlorhexidine 0.12% Liquid 15 milliLiter(s) Oral Mucosa every 12 hours  chlorhexidine 4% Liquid 1 Application(s) Topical daily  dextrose 5%. 1000 milliLiter(s) (50 mL/Hr) IV Continuous <Continuous>  dextrose 5%. 1000 milliLiter(s) (100 mL/Hr) IV Continuous <Continuous>  dextrose 5%. 1000 milliLiter(s) (50 mL/Hr) IV Continuous <Continuous>  dextrose 5%. 1000 milliLiter(s) (100 mL/Hr) IV Continuous <Continuous>  dextrose 50% Injectable 25 Gram(s) IV Push once  dextrose 50% Injectable 12.5 Gram(s) IV Push once  dextrose 50% Injectable 25 Gram(s) IV Push once  diclofenac sodium 1% Gel 2 Gram(s) Topical every 6 hours  escitalopram 10 milliGRAM(s) Oral <User Schedule>  fentaNYL   Patch  12 MICROgram(s)/Hr 1 Patch Transdermal every 72 hours  fentaNYL   Patch  25 MICROgram(s)/Hr 1 Patch Transdermal every 72 hours  gabapentin Solution 250 milliGRAM(s) Oral <User Schedule>  glucagon  Injectable 1 milliGRAM(s) IntraMuscular once  glucagon  Injectable 1 milliGRAM(s) IntraMuscular once  hemorrhoidal Ointment 1 Application(s) Rectal at bedtime  influenza  Vaccine (HIGH DOSE) 0.5 milliLiter(s) IntraMuscular once  insulin glargine Injectable (LANTUS) 10 Unit(s) SubCutaneous <User Schedule>  insulin lispro (ADMELOG) corrective regimen sliding scale   SubCutaneous every 6 hours  insulin lispro Injectable (ADMELOG) 40 Unit(s) SubCutaneous <User Schedule>  insulin lispro Injectable (ADMELOG) 26 Unit(s) SubCutaneous <User Schedule>  lactobacillus acidophilus 1 Tablet(s) Oral <User Schedule>  levothyroxine 125 MICROGram(s) Oral <User Schedule>  lidocaine   4% Patch 4 Patch Transdermal every 24 hours  melatonin 12 milliGRAM(s) Oral <User Schedule>  nystatin Powder 1 Application(s) Topical every 8 hours  pantoprazole  Injectable 40 milliGRAM(s) IV Push every 12 hours  predniSONE  Solution 5 milliGRAM(s) Enteral Tube <User Schedule>  QUEtiapine 12.5 milliGRAM(s) Oral <User Schedule>  sodium chloride 1 Gram(s) Oral every 8 hours  sodium chloride 0.65% Nasal 1 Spray(s) Both Nostrils every 6 hours  vancomycin    Solution 125 milliGRAM(s) Oral every 6 hours  witch hazel Pads 1 Application(s) Topical every 12 hours    MEDICATIONS  (PRN):  acetaminophen   Oral Liquid .. 650 milliGRAM(s) Oral every 6 hours PRN Temp greater or equal to 38C (100.4F), Mild Pain (1 - 3)  aluminum hydroxide/magnesium hydroxide/simethicone Suspension 30 milliLiter(s) Enteral Tube every 4 hours PRN Dyspepsia  oxyCODONE    Solution 5 milliGRAM(s) Oral every 6 hours PRN Moderate Pain (4 - 6)  oxyCODONE    Solution 10 milliGRAM(s) Oral every 6 hours PRN Severe Pain (7 - 10)  oxyCODONE    Solution 2.5 milliGRAM(s) Oral every 6 hours PRN Mild Pain (1 - 3)      LABS:                        7.8    12.79 )-----------( 203      ( 09 Nov 2024 06:39 )             27.1     11-09    133[L]  |  102  |  20  ----------------------------<  106[H]  4.1   |  19[L]  |  <0.30[L]    Ca    8.4      09 Nov 2024 06:40  Phos  2.4     11-09  Mg     1.8     11-09    TPro  6.2  /  Alb  2.1[L]  /  TBili  0.5  /  DBili  x   /  AST  28  /  ALT  17  /  AlkPhos  165[H]  11-09      Urinalysis Basic - ( 09 Nov 2024 06:40 )    Color: x / Appearance: x / SG: x / pH: x  Gluc: 106 mg/dL / Ketone: x  / Bili: x / Urobili: x   Blood: x / Protein: x / Nitrite: x   Leuk Esterase: x / RBC: x / WBC x   Sq Epi: x / Non Sq Epi: x / Bacteria: x          CAPILLARY BLOOD GLUCOSE    MICROBIOLOGY:     RADIOLOGY:  [ ] Reviewed and interpreted by me    Mode: AC/ CMV (Assist Control/ Continuous Mandatory Ventilation)  RR (machine): 18  TV (machine): 350  FiO2: 30  PEEP: 5  ITime: 1  MAP: 12  PIP: 38   Patient is a 74y old  Female who presents with a chief complaint of Patient admitted with Hypoxemia and episode of Bright red blood per rectum (09 Nov 2024 07:08)      Interval Events: Febrile morning of 11/10 -- ofirmev given.    REVIEW OF SYSTEMS:  [ ] Positive  [ ] All other systems negative  [ ] Unable to assess ROS because ________    Vital Signs Last 24 Hrs  T(C): 36.7 (11-10-24 @ 05:15), Max: 38.7 (11-09-24 @ 09:41)  T(F): 98 (11-10-24 @ 05:15), Max: 101.7 (11-09-24 @ 09:41)  HR: 92 (11-10-24 @ 06:22) (89 - 118)  BP: 151/70 (11-10-24 @ 05:15) (110/57 - 151/70)  RR: 18 (11-10-24 @ 05:15) (18 - 23)  SpO2: 100% (11-10-24 @ 06:22) (99% - 100%)      PHYSICAL EXAM:  HEENT:   [X] Tracheostomy: #8 XLT cuffed Shiley   [X] PERRLA  [ ] No oral lesions  [ ] Abnormal    SKIN  [ ] No Rash  [ ] Abnormal  [X] pressure - sacral unstageable    CARDIAC  [X] Regular  [ ] Abnormal    PULMONARY  [ ] Bilateral Clear Breath Sounds  [ ] Normal Excursion  [X] Abnormal - mild coarse BS     GI  [X] PEG        [X] +BS		              [X] Soft, nondistended, nontender	  [X] Abnormal - old PEG site c/d/i, + rectal tube     MUSCULOSKELETAL                                   [X] Bedbound                 [ ] Abnormal    [ ] Ambulatory/OOB to chair                           EXTREMITIES                                         [ ] Normal    [X] Edema - mild generalized pitting edema                          NEUROLOGIC  [ ] Normal, non focal  [X] Focal findings: lethargic    PSYCHIATRIC  [x] Lethargic  [ ] Sedated	 [ ]Agitated    :  Mcbride: [ ] Yes, if yes: Date of Placement:                   [X] No    LINES: Central Lines [ ] Yes, if yes: Date of Placement                                     [X] No        HOSPITAL MEDICATIONS:  MEDICATIONS  (STANDING):  albuterol/ipratropium for Nebulization 3 milliLiter(s) Nebulizer every 6 hours  AQUAPHOR (petrolatum Ointment) 1 Application(s) Topical once  artificial  tears Solution 1 Drop(s) Both EYES every 6 hours  ascorbic acid 500 milliGRAM(s) Oral daily  Biotene Dry Mouth Oral Rinse 5 milliLiter(s) Swish and Spit every 6 hours  calcium carbonate 1250 mG  + Vitamin D (OsCal 500 + D) 1 Tablet(s) Oral <User Schedule>  cefepime   IVPB 2000 milliGRAM(s) IV Intermittent every 8 hours  chlorhexidine 0.12% Liquid 15 milliLiter(s) Oral Mucosa every 12 hours  chlorhexidine 4% Liquid 1 Application(s) Topical daily  dextrose 5%. 1000 milliLiter(s) (50 mL/Hr) IV Continuous <Continuous>  dextrose 5%. 1000 milliLiter(s) (100 mL/Hr) IV Continuous <Continuous>  dextrose 5%. 1000 milliLiter(s) (50 mL/Hr) IV Continuous <Continuous>  dextrose 5%. 1000 milliLiter(s) (100 mL/Hr) IV Continuous <Continuous>  dextrose 50% Injectable 25 Gram(s) IV Push once  dextrose 50% Injectable 12.5 Gram(s) IV Push once  dextrose 50% Injectable 25 Gram(s) IV Push once  diclofenac sodium 1% Gel 2 Gram(s) Topical every 6 hours  escitalopram 10 milliGRAM(s) Oral <User Schedule>  fentaNYL   Patch  12 MICROgram(s)/Hr 1 Patch Transdermal every 72 hours  fentaNYL   Patch  25 MICROgram(s)/Hr 1 Patch Transdermal every 72 hours  gabapentin Solution 250 milliGRAM(s) Oral <User Schedule>  glucagon  Injectable 1 milliGRAM(s) IntraMuscular once  glucagon  Injectable 1 milliGRAM(s) IntraMuscular once  hemorrhoidal Ointment 1 Application(s) Rectal at bedtime  influenza  Vaccine (HIGH DOSE) 0.5 milliLiter(s) IntraMuscular once  insulin glargine Injectable (LANTUS) 10 Unit(s) SubCutaneous <User Schedule>  insulin lispro (ADMELOG) corrective regimen sliding scale   SubCutaneous every 6 hours  insulin lispro Injectable (ADMELOG) 40 Unit(s) SubCutaneous <User Schedule>  insulin lispro Injectable (ADMELOG) 26 Unit(s) SubCutaneous <User Schedule>  lactobacillus acidophilus 1 Tablet(s) Oral <User Schedule>  levothyroxine 125 MICROGram(s) Oral <User Schedule>  lidocaine   4% Patch 4 Patch Transdermal every 24 hours  melatonin 12 milliGRAM(s) Oral <User Schedule>  nystatin Powder 1 Application(s) Topical every 8 hours  pantoprazole  Injectable 40 milliGRAM(s) IV Push every 12 hours  predniSONE  Solution 5 milliGRAM(s) Enteral Tube <User Schedule>  QUEtiapine 12.5 milliGRAM(s) Oral <User Schedule>  sodium chloride 1 Gram(s) Oral every 8 hours  sodium chloride 0.65% Nasal 1 Spray(s) Both Nostrils every 6 hours  vancomycin    Solution 125 milliGRAM(s) Oral every 6 hours  witch hazel Pads 1 Application(s) Topical every 12 hours    MEDICATIONS  (PRN):  acetaminophen   Oral Liquid .. 650 milliGRAM(s) Oral every 6 hours PRN Temp greater or equal to 38C (100.4F), Mild Pain (1 - 3)  aluminum hydroxide/magnesium hydroxide/simethicone Suspension 30 milliLiter(s) Enteral Tube every 4 hours PRN Dyspepsia  oxyCODONE    Solution 5 milliGRAM(s) Oral every 6 hours PRN Moderate Pain (4 - 6)  oxyCODONE    Solution 10 milliGRAM(s) Oral every 6 hours PRN Severe Pain (7 - 10)  oxyCODONE    Solution 2.5 milliGRAM(s) Oral every 6 hours PRN Mild Pain (1 - 3)      LABS:                        7.8    12.79 )-----------( 203      ( 09 Nov 2024 06:39 )             27.1     11-09    133[L]  |  102  |  20  ----------------------------<  106[H]  4.1   |  19[L]  |  <0.30[L]    Ca    8.4      09 Nov 2024 06:40  Phos  2.4     11-09  Mg     1.8     11-09    TPro  6.2  /  Alb  2.1[L]  /  TBili  0.5  /  DBili  x   /  AST  28  /  ALT  17  /  AlkPhos  165[H]  11-09      Urinalysis Basic - ( 09 Nov 2024 06:40 )    Color: x / Appearance: x / SG: x / pH: x  Gluc: 106 mg/dL / Ketone: x  / Bili: x / Urobili: x   Blood: x / Protein: x / Nitrite: x   Leuk Esterase: x / RBC: x / WBC x   Sq Epi: x / Non Sq Epi: x / Bacteria: x          CAPILLARY BLOOD GLUCOSE    MICROBIOLOGY:     RADIOLOGY:  [ ] Reviewed and interpreted by me    Mode: AC/ CMV (Assist Control/ Continuous Mandatory Ventilation)  RR (machine): 18  TV (machine): 350  FiO2: 30  PEEP: 5  ITime: 1  MAP: 12  PIP: 38

## 2024-11-10 NOTE — PROGRESS NOTE ADULT - PROBLEM SELECTOR PLAN 2
[] PNA / Intermittent fevers  - Sputum cx 10/8: PSA  - CXR 10/7: Opacification of the right middle lobe may represent pneumonia versus atelectasis  - CT A/P 10/18: ? Multifocal pneumonia  - Completed Initial course of cefepime on 10/12  - Sputum 11/4: PSA  - Repeat CT C/A/P: Consolidative opacity LLL  - Cefepime restarted 11/5-->11/9; in setting of recurrent fevers  - Will dc Cefepime 11/9 and monitor temp curve for possible drug related fever    [] C.Diff   - Stool C.diff: + 11/1   - S/p Flagyl 11/3-11/8  - Continue enteral Vanco  - + RT remains in place; monitor output

## 2024-11-10 NOTE — PROGRESS NOTE ADULT - NS ATTEND AMEND GEN_ALL_CORE FT
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 Found to have Pseudomonas aeruginosa in sputum culture and urine culture with ESBL E. coli s/p course of meropenem. Course complicated by antibiotic-associated diarrhea.     PAIN/neuro- C/W current pain regiment, well controlled. C/W escitalopram and seroquel CTH w/o acute changes  Resp- continue lung protective ventilation. Does poorly on PS trials. Continue Duonebs.   ID- cont tx for cdiff, f/u ID reccs (prior cx's w/ PsA sputum and esbl ecoli uti) ?PNA on CT, cefepime for now, f/u ID reccs poss DC 11/9  GI- monitor BM frequency, volume improved past 24h  Heme- Anemia multifactorial, hemorrhoids, AOCD. s/p EGD without active bleeding, f/u Heme reccs  DM- cont to monitor FS    Rheum-  RA, on home prednisone 5 mg daily   FEN- monitor hypoNa, tsh normal   Wound- cont wound care (and f/u team reccs) to sacrum for decub (also likely moisture related rash areas on back)   - DVT ppx- only scd for now given significant anemia a few days ago  DC planning to home    Cesar Hernandez MD-Pulmonary   [phone] as above: no new events  74F PMH DM2 (insulin-dependent), HTN, HLD, hypothyroidism, RA on Prednisone, fibromyalgia, cerebral aneurysm s/p repair, chronic hypoxemia and hypercapnic respiratory failure s/p trach, vent-dependent, recurrent C diff infection, oropharyngeal dysphagia s/p G-J tube with persistent GJ tube leaks now s/p GC fistula repair 8/12, and recent presentation 5 days PTA for rectal bleeding requiring transfusion in the ED who now presents with acute hypoxemic respiratory distress 2/2 RML PNA, admitted to the RCU for further management. Found to have Pseudomonas aeruginosa in sputum culture and urine culture with ESBL E. coli s/p course of meropenem. Course complicated by antibiotic-associated diarrhea.     PAIN/neuro- C/W current pain regiment, well controlled. C/W escitalopram and seroquel CTH w/o acute changes  Resp- continue lung protective ventilation. Does poorly on PS trials. Continue Duonebs.   ID- cont tx for cdiff, f/u ID reccs (prior cx's w/ PsA sputum and esbl ecoli uti) ?PNA on CT, cefepime for now, f/u ID reccs poss DC 11/9  GI- monitor BM frequency, volume improved past 24h-feeding tube issues improving  Heme- Anemia multifactorial, hemorrhoids, AOCD. s/p EGD without active bleeding, f/u Heme reccs  DM- cont to monitor FS    Rheum-  RA, on home prednisone 5 mg daily   FEN- monitor hypoNa, tsh normal   Wound- cont wound care (and f/u team reccs) to sacrum for decub (also likely moisture related rash areas on back)   - DVT ppx- only scd for now given significant anemia a few days ago  DC planning to home    Cesar Hernandez MD-Pulmonary   [phone]

## 2024-11-10 NOTE — CHART NOTE - NSCHARTNOTEFT_GEN_A_CORE
POCT Blood Glucose:  281 mg/dL (11-10-24 @ 17:41)  190 mg/dL (11-10-24 @ 12:02)  105 mg/dL (11-10-24 @ 05:23)  173 mg/dL (11-10-24 @ 00:23)      eMAR:  insulin glargine Injectable (LANTUS)   10 Unit(s) SubCutaneous (11-10-24 @ 05:27)    insulin lispro (ADMELOG) corrective regimen sliding scale   0 Unit(s) SubCutaneous (11-10-24 @ 12:50)    insulin lispro Injectable (ADMELOG)   40 Unit(s) SubCutaneous (11-10-24 @ 12:55)    insulin lispro Injectable (ADMELOG)   26 Unit(s) SubCutaneous (11-10-24 @ 05:26)    levothyroxine   125 MICROGram(s) Oral (11-10-24 @ 05:24)    predniSONE  Solution   5 milliGRAM(s) Enteral Tube (11-10-24 @ 12:38)    Diet, NPO with Tube Feed:   Tube Feeding Modality: Jejunostomy  [facility] Peptide 1.5 ([id number])  Total Volume for 24 Hours (mL): 960  Continuous  Until Goal Tube Feed Rate (mL per Hour): 80  Tube Feed Duration (in Hours): 12  Tube Feed Start Time: 06:00  Free Water Flush  Pump   Rate (mL per Hour): 50   Frequency: Every Hour  Free Water Flush Instructions:  50ml free water flushes Q1hr  Alfred(7 Gm Arginine/7 Gm Glut/1.2 Gm HMB     Qty per Day:  2  No Carb Prosource (1pkg = 15gms Protein)     Qty per Day:  1  Banatrol TF     Qty per Day:  1/2 packet per daily (11-01-24 @ 08:28) [Active]      Chart reviewed and spoke with RN  Had fever and producing thick yellow sputum as per RN  Currently on 12 hours continuous tube feeding 6am -6pm. No vomiting but persistent diarrhea requiring rectal tube   Last 24 hour BG fluctuating 105-281, with am BG low 100s for 2 days and BGs above goal while on tube feeds    - Lantus dose decreased to 10 units preventively   - will increase Admelog dose to 42 units at noon tomorrow       Contact via [employer] Teams during business hours  To reach covering provider access AMION via sun[employer] tools  For Urgent matters/after-hours/weekends/holidays please page endocrine fellow on call   For nonurgent matters please email [email or web address]    Please note that this patient may be followed by different provider tomorrow.  Notify endocrine 24 hours prior to discharge for final recommendations

## 2024-11-11 NOTE — PROGRESS NOTE ADULT - PROBLEM SELECTOR PLAN 9
- Patient has known hx of prior G-Tube stoma leak   - S/p EGD for closure of Gastric Fistula (8/12) w/ Total of 3 Mantis clips and two 22 mm Microtech clips by GI Dr Rosales   - Old stoma peg site with mild clear drainage s/p repair of fistula  - 10/14 G-J exchanged by GI and clips applied to fistula site  - 10/17 PEGJ clogged  - 10/21 PEGJ revision done, tolerating enteral feeds  - Both feeds and medications are now give through feeding/J-port  - G-port is placed to continuous bag drainage for decompression  - Continue Maalox TID for gaseous distention - Patient has known hx of prior G-Tube stoma leak   - S/p EGD for closure of Gastric Fistula (8/12) w/ Total of 3 Mantis clips and two 22 mm Microtech clips by GI Dr Rosales   - Old stoma peg site with mild clear drainage s/p repair of fistula  - 10/14 G-J exchanged by GI and clips applied to fistula site  - 10/17 PEGJ clogged  - 10/21 PEGJ revision done, tolerating enteral feeds  - Both feeds and medications are now give through feeding/J-port  - G-port is placed to continuous bag drainage for decompression  - Continue Maalox TID for gaseous distention  - Tube study requested to evaluate for J-tube migration 11/9 -- showed tip in duodenum. Increased output from J tube, possible aspiration -- asked GI to come reassess; ECG for QT + reglan; dc TF, dc Admelog (c/w Lantus), started d5 - Patient has known hx of prior G-Tube stoma leak   - S/p EGD for closure of Gastric Fistula (8/12) w/ Total of 3 Mantis clips and two 22 mm Microtech clips by GI Dr Rosales   - Old stoma peg site with mild clear drainage s/p repair of fistula  - 10/14 G-J exchanged by GI and clips applied to fistula site  - 10/17 PEGJ clogged  - 10/21 PEGJ revision done, tolerating enteral feeds  - Both feeds and medications are now give through feeding/J-port  - G-port is placed to continuous bag drainage for decompression  - Continue Maalox TID for gaseous distention  - Tube study requested to evaluate for J-tube migration 11/9 -- showed tip in duodenum. Increased output from J tube, possible aspiration -- asked GI to come reassess; ECG for QT, started erythromycin (reglan contraindicated); dc TF, dc Admelog (c/w Lantus), started d5 -- GI requested CT CAP 11/11

## 2024-11-11 NOTE — PROGRESS NOTE ADULT - SUBJECTIVE AND OBJECTIVE BOX
INTERVAL HPI/OVERNIGHT EVENTS:    Call to see patient again for G tube output to be abnormal    According to the family, G tube output matches the feed.  J tube is on hold.  Diarrhea ongoing.  C. diff +  No bloody diarrhea.  No real complain of abdomina pain but patient is lethargic.    MEDICATIONS  (STANDING):  albuterol/ipratropium for Nebulization 3 milliLiter(s) Nebulizer every 6 hours  AQUAPHOR (petrolatum Ointment) 1 Application(s) Topical once  artificial  tears Solution 1 Drop(s) Both EYES every 6 hours  ascorbic acid 500 milliGRAM(s) Oral daily  Biotene Dry Mouth Oral Rinse 5 milliLiter(s) Swish and Spit every 6 hours  calcium carbonate 1250 mG  + Vitamin D (OsCal 500 + D) 1 Tablet(s) Oral <User Schedule>  cefepime   IVPB 2000 milliGRAM(s) IV Intermittent every 8 hours  chlorhexidine 0.12% Liquid 15 milliLiter(s) Oral Mucosa every 12 hours  chlorhexidine 4% Liquid 1 Application(s) Topical daily  dextrose 5%. 1000 milliLiter(s) (50 mL/Hr) IV Continuous <Continuous>  dextrose 5%. 1000 milliLiter(s) (100 mL/Hr) IV Continuous <Continuous>  dextrose 5%. 1000 milliLiter(s) (50 mL/Hr) IV Continuous <Continuous>  dextrose 5%. 1000 milliLiter(s) (50 mL/Hr) IV Continuous <Continuous>  dextrose 5%. 1000 milliLiter(s) (100 mL/Hr) IV Continuous <Continuous>  dextrose 50% Injectable 25 Gram(s) IV Push once  dextrose 50% Injectable 12.5 Gram(s) IV Push once  dextrose 50% Injectable 25 Gram(s) IV Push once  diclofenac sodium 1% Gel 2 Gram(s) Topical every 6 hours  erythromycin   IVPB 160 milliGRAM(s) IV Intermittent every 8 hours  escitalopram 10 milliGRAM(s) Oral <User Schedule>  gabapentin Solution 250 milliGRAM(s) Oral <User Schedule>  glucagon  Injectable 1 milliGRAM(s) IntraMuscular once  glucagon  Injectable 1 milliGRAM(s) IntraMuscular once  hemorrhoidal Ointment 1 Application(s) Rectal at bedtime  influenza  Vaccine (HIGH DOSE) 0.5 milliLiter(s) IntraMuscular once  insulin glargine Injectable (LANTUS) 10 Unit(s) SubCutaneous <User Schedule>  insulin lispro (ADMELOG) corrective regimen sliding scale   SubCutaneous every 6 hours  lactobacillus acidophilus 1 Tablet(s) Oral <User Schedule>  levothyroxine 125 MICROGram(s) Oral <User Schedule>  lidocaine   4% Patch 4 Patch Transdermal every 24 hours  melatonin 12 milliGRAM(s) Oral <User Schedule>  nystatin Powder 1 Application(s) Topical every 8 hours  pantoprazole  Injectable 40 milliGRAM(s) IV Push every 12 hours  predniSONE  Solution 5 milliGRAM(s) Enteral Tube <User Schedule>  QUEtiapine 12.5 milliGRAM(s) Oral <User Schedule>  sodium chloride 1 Gram(s) Oral every 8 hours  sodium chloride 0.65% Nasal 1 Spray(s) Both Nostrils every 6 hours  vancomycin    Solution 125 milliGRAM(s) Oral every 6 hours  witch hazel Pads 1 Application(s) Topical every 12 hours    MEDICATIONS  (PRN):  acetaminophen   Oral Liquid .. 650 milliGRAM(s) Oral every 6 hours PRN Temp greater or equal to 38C (100.4F), Mild Pain (1 - 3)  aluminum hydroxide/magnesium hydroxide/simethicone Suspension 30 milliLiter(s) Enteral Tube every 4 hours PRN Dyspepsia  oxyCODONE    Solution 10 milliGRAM(s) Oral every 6 hours PRN Severe Pain (7 - 10)  oxyCODONE    Solution 2.5 milliGRAM(s) Oral every 6 hours PRN Mild Pain (1 - 3)  oxyCODONE    Solution 5 milliGRAM(s) Oral every 6 hours PRN Moderate Pain (4 - 6)      Allergies    walnut (Unknown)  metronidazole (Rash)  Lyrica (Unknown)  penicillin (Unknown)  Pineapple (Unknown)  Tagamet (Unknown)  heparin (Unknown)  Pecans (Unknown)  Hazelnut (Unknown)  meropenem (Rash)    Intolerances    Vital Signs Last 24 Hrs  T(C): 36.7 (11 Nov 2024 10:44), Max: 39.3 (11 Nov 2024 05:11)  T(F): 98 (11 Nov 2024 10:44), Max: 102.8 (11 Nov 2024 05:11)  HR: 104 (11 Nov 2024 17:13) (99 - 130)  BP: 92/53 (11 Nov 2024 10:44) (92/53 - 137/69)  BP(mean): --  RR: 20 (11 Nov 2024 10:44) (18 - 20)  SpO2: 100% (11 Nov 2024 17:13) (100% - 100%)    Parameters below as of 11 Nov 2024 17:13  Patient On (Oxygen Delivery Method): ventilator        PHYSICAL EXAM:  Constitutional:  Tracheostomy, but lethargic but opens eyes to verbal command.  Gastrointestinal: BS+, soft GJ tube intact.  Flushed J tube with sterile water with some resistance.  G tube output noted.  Rectal bag with liquid brown stool    LABS:                        7.9    21.43 )-----------( 202      ( 11 Nov 2024 07:13 )             27.9     11-11    135  |  104  |  24[H]  ----------------------------<  148[H]  3.4[L]   |  15[L]  |  <0.30[L]    Ca    8.2[L]      11 Nov 2024 07:13  Phos  2.2     11-11  Mg     1.8     11-11    TPro  5.5[L]  /  Alb  2.0[L]  /  TBili  0.7  /  DBili  x   /  AST  20  /  ALT  13  /  AlkPhos  150[H]  11-11      Urinalysis Basic - ( 11 Nov 2024 07:13 )    Color: x / Appearance: x / SG: x / pH: x  Gluc: 148 mg/dL / Ketone: x  / Bili: x / Urobili: x   Blood: x / Protein: x / Nitrite: x   Leuk Esterase: x / RBC: x / WBC x   Sq Epi: x / Non Sq Epi: x / Bacteria: x      LIVER FUNCTIONS - ( 11 Nov 2024 07:13 )  Alb: 2.0 g/dL / Pro: 5.5 g/dL / ALK PHOS: 150 U/L / ALT: 13 U/L / AST: 20 U/L / GGT: x             RADIOLOGY & ADDITIONAL TESTS:

## 2024-11-11 NOTE — PROGRESS NOTE ADULT - PROBLEM SELECTOR PLAN 2
[] PNA / Intermittent fevers  - Sputum cx 10/8: PSA  - CXR 10/7: Opacification of the right middle lobe may represent pneumonia versus atelectasis  - CT A/P 10/18: ? Multifocal pneumonia  - Completed Initial course of cefepime on 10/12  - Sputum 11/4: PSA  - Repeat CT C/A/P: Consolidative opacity LLL  - Cefepime restarted 11/5-->11/9; in setting of recurrent fevers  - Will dc Cefepime 11/9 and monitor temp curve for possible drug related fever    [] C.Diff   - Stool C.diff: + 11/1   - S/p Flagyl 11/3-11/8  - Continue enteral Vanco  - + RT remains in place; monitor output [] PNA / Intermittent fevers  - Sputum cx 10/8: PSA  - CXR 10/7: Opacification of the right middle lobe may represent pneumonia versus atelectasis  - CT A/P 10/18: ? Multifocal pneumonia  - Completed Initial course of cefepime on 10/12  - Sputum 11/4: PSA  - Repeat CT C/A/P 11/6: Consolidative opacity LLL  - Cefepime restarted 11/5-->11/10; in setting of recurrent fevers  - Febrile morning 11/10 and overnight 11/11 -- ofirmev given, pan cultures sent on 11/10 -- procal 1.11 --> 43; WBC 15.65 --> 21.43; increased output from J tube - possible asp? -- CXR ordered; ECG for QT + reglan; dc TF, dc Admelog (c/w Lantus), started d5    [] C.Diff   - Stool C.diff: + 11/1   - S/p Flagyl 11/3-11/8  - Continue enteral Vanco  - + RT remains in place; monitor output [] PNA / Intermittent fevers  - Sputum cx 10/8: PSA  - CXR 10/7: Opacification of the right middle lobe may represent pneumonia versus atelectasis  - CT A/P 10/18: ? Multifocal pneumonia  - Completed Initial course of cefepime on 10/12  - Sputum 11/4: PSA  - Repeat CT C/A/P 11/6: Consolidative opacity LLL  - Cefepime restarted 11/5-->11/10; in setting of recurrent fevers  - Febrile morning 11/10 and overnight 11/11 -- ofirmev given, pan cultures sent on 11/10 -- procal 1.11 --> 43; WBC 15.65 --> 21.43; increased output from J tube - possible asp? -- CXR ordered; ECG for QT, started erythromycin (reglan contraindicated); dc TF, dc Admelog (c/w Lantus), started d5 -- GI requested CT CAP 11/11    [] C.Diff   - Stool C.diff: + 11/1   - S/p Flagyl 11/3-11/8  - Continue enteral Vanco  - + RT remains in place; monitor output

## 2024-11-11 NOTE — PROGRESS NOTE ADULT - ASSESSMENT
In summary, elderly female with leukocytosis, diarrhea, with abnormal G tube output.  Concern if feed is refluxing back to the stomach versus J tube now in the stomach.      PLAN    She is scheduled for CT scan today - this fell help in assessing the location of the J tube extension.    Continue C. diff treatment    Anil Rosales MD

## 2024-11-11 NOTE — PROGRESS NOTE ADULT - ASSESSMENT
73 yo woman PMH T2DM on insulin, HTN, HLD, hypothyroidism, RA on Prednisone, Fibromyalgia, cerebral aneurysm s/p repair, chronic hypercapnic respiratory failure s/p trach (vent dependent) and chronic PEG 2022, recurrent C. diff infection , persistent GJ tube leaks now s/p GC fistula repair 8/12  admitted 10/7/24 with resp distress and found to have RML opacity     Antibiotics  Ceftriaxone 10/7  Azithro 10/7  Cefepime 10/7--> 10/12  meropenem 10/15 --> 10/23  IV Vanco 10/17 --> 10/21  Vanco via NGT 10/24; 11/1-->  Metronidazole 11/3 --> 11/7  Cefepime 11/5 --> 11/10; 11/11-->  Erythromycin 11/11-->      10/10 melena, anemia, blood tx  10/14 EGD for GJ exchange and piror PEG site closure  One benign-appearing, intrinsic moderate stenosis was found in the upper third of the        esophagus. The stenosis was traversed.       The cardia and gastric fundus were normal.       A gastric tube with tip in the jejunum was found in the gastric body. The PEG required        removal because it was leaking. The J tube extension (12F) was removed first. An externally        removable 24 Fr EndoVive Safety gastrostomy tube was lubricated. The guide wire was passed        through the existing G-tube port and snared endoscopically. The endoscope and snare were then        removed, pulling the wire out through the mouth. The g-tube was tied to the guidewire, pulled        through the mouth into the stomach and then pulled out from the stomach through the skin. The        bumper was attached to the gastrostomy tube. The feeding tube was then cut to an appropriate        length. The final position of the gastrostomy tube was confirmed by relook endoscopy, and        skin marking noted to be 3 cm at the external bumper. The final tension and compression of        the abdominal wall by the PEG tube and external bumper were checked and revealed that the        bumper was moderately tight and mildly deforming the skin. The J tube extension (12 F        EndoVive Jejunal feeding tube) was advanced into the stomach. The tip of the J tube had a        loop thread that was captured with a Resolution clip. The thread and the J tube extension        were advanced to the proximal jejunum, and the endoclip along with the tip of the J tube was        attached to the wall securely. The J tube extension was capped, and the tube site was cleaned        and dressed.       A small fistula was found on the anterior wall of the gastric body related to prior G tube        site. Coagulation of tissue near the fistula using argon plasma at 1.2 liters/minute and 35        peraza was successful. To closure the gastrocutaneous fistula, four hemostatic clips were        successfully placed (MR conditional, two 16 mm DuraClip and 2 Mantis clips.       The examined duodenum was normal.    10/14, 10/15 Fever, transient hypoxia post procedure  10/15 ProCalcitonin = 8.48 suggestive of bacterial process; BCx x2 NGTD; Trach: Pseudomonas   - though benign mg stain  10;16 Leukocytosis WBC = 14.26  10/17 fever, hypotension, abd distension, no diarrhea noted this am WBC = 15.02; Rising Procalcitonin = 38.49; Obstructed J tube   10/18  lost IV access re-gained  - CT scan late this afternoon  WBC = 8.68; Rising Procalcitonin >100  10/18 imaging suggests multifocal pneumonia  10/21 improved chest exam, Procalcitonin level considerably decreased, decreased FiO2  - afebrile since 10/18  10/21 UGI endoscopy done  Findings:       The esophagus was normal. A fistula was found on the anterior wall of the gastric body.       There was evidence of a gastrostomy present in the gastric body. The patient was placed in        the supine position. The endoscope was advanced to the jejunum and the prior placed J tube        with loop attached to the wall and the loop thread was cut by endoclip. The J tube and the G        tube were removed. The gastrostomy fistula was utilized and an Avanos 22 F        Gastrostomy/Jejunal tube was advanced. The jejunal tip was advanced through the pylorus and        into the duodenum. The endoscope was then advanced to the duodenum and the loop thread at the        tip of the J tube was captured and advanced to the proximal jejunum. The loop thread was        clipped to wall by a [email or web address] Resolution clip. The J tube flushed easily and the G        tube decompress the abdomen easily. The internal balloon was insufflated with 10 cc of water        to hold the GJ tube in place. The outside marking was at 3.  10/23  no distress, liquid stools noted  - Meropenem discontinued  GI PCR NEG  10/24  persistent diarrhea  Cdiff NEG; enteral vanco stopped and Immodium provided  10/28 low grade temps, mild leukocytosis  10/30 blood transfusion  11/1  fever  +Cdiff  11/3 continued fever  - +BC Stph epi most likely procurement contaminant,  modest diarrhea reported  - daughter described increased diarrhea in evenings - Pseudomonas airway colonization is long term, no suggestion of pneumonia at present, sacral decub remains superifical will granulated without signs of infection  11/4  - fever, abnormal chest exam though FIO2 still 30%  rectal tube inserted for increased diarrhea  11/5 lethargy, fever, leukocytosis, increased lactate, hyponatremia  11/6 sputum gram stain suggests persistent Pseudomonas colonization  - continued fever, leukocytosis  11/7 unclear if LLL consolidation represents infection. Repeat Procalcitonin  11/7= 2.92 decreased  11/8 continued fevers  +diarrhea, lethargy continues  to complete Cefepime for LLL pneumonia v atelectasis tomorrow 11/9 11/11 increased fever with increased WBC and increased Procalcitonin off Cefepime noted, which was resumed    Issues  recurrent Cdiff  cerebral aneurysm s/p repair  chronic hypercapnic respiratory failure s/p trach (vent dependent) and chronic PEG 2022  anemia  - had iron deficiency  Pseudomonas aeruginosa upper airway colonization  (most recent isolate Resist to Cipro/Levo)  T2DM on insulin  10.14.24 -A1C: 5.8%  HTN  HLD  hypothyroidism  RA on Prednisone  Fibromyalgia  debility  sacral ulcer largely healed  malnourished/chronic catabolic state      Suggest  Continue vanco 125 q 6 hours via J tube 11/1 -->  Resume Cefepime       discussed with RCU team who are planning scanning to assist in defining source of infection

## 2024-11-11 NOTE — PROGRESS NOTE ADULT - SUBJECTIVE AND OBJECTIVE BOX
Chief Complaint: Diabetes Mellitus follow up    INTERVAL HX:  Patient seen at bedside with   and aide present. She is sleeping. Rectal tube, emerson catheter and TF running @ 80cc/hr at time of visit.   BG over the last 24 hrs have been mostly at goal range 100-180mg/dl. No hypoglycemia.     Review of Systems:  unable    Allergies    walnut (Unknown)  metronidazole (Rash)  Lyrica (Unknown)  penicillin (Unknown)  Pineapple (Unknown)  Tagamet (Unknown)  heparin (Unknown)  Pecans (Unknown)  Hazelnut (Unknown)  meropenem (Rash)    Intolerances      MEDICATIONS  (STANDING):  albuterol/ipratropium for Nebulization 3 milliLiter(s) Nebulizer every 6 hours  AQUAPHOR (petrolatum Ointment) 1 Application(s) Topical once  artificial  tears Solution 1 Drop(s) Both EYES every 6 hours  ascorbic acid 500 milliGRAM(s) Oral daily  Biotene Dry Mouth Oral Rinse 5 milliLiter(s) Swish and Spit every 6 hours  calcium carbonate 1250 mG  + Vitamin D (OsCal 500 + D) 1 Tablet(s) Oral <User Schedule>  chlorhexidine 0.12% Liquid 15 milliLiter(s) Oral Mucosa every 12 hours  chlorhexidine 4% Liquid 1 Application(s) Topical daily  dextrose 5%. 1000 milliLiter(s) (50 mL/Hr) IV Continuous <Continuous>  dextrose 5%. 1000 milliLiter(s) (100 mL/Hr) IV Continuous <Continuous>  dextrose 5%. 1000 milliLiter(s) (50 mL/Hr) IV Continuous <Continuous>  dextrose 5%. 1000 milliLiter(s) (100 mL/Hr) IV Continuous <Continuous>  dextrose 5%. 1000 milliLiter(s) (50 mL/Hr) IV Continuous <Continuous>  dextrose 50% Injectable 25 Gram(s) IV Push once  dextrose 50% Injectable 12.5 Gram(s) IV Push once  dextrose 50% Injectable 25 Gram(s) IV Push once  diclofenac sodium 1% Gel 2 Gram(s) Topical every 6 hours  escitalopram 10 milliGRAM(s) Oral <User Schedule>  gabapentin Solution 250 milliGRAM(s) Oral <User Schedule>  glucagon  Injectable 1 milliGRAM(s) IntraMuscular once  glucagon  Injectable 1 milliGRAM(s) IntraMuscular once  hemorrhoidal Ointment 1 Application(s) Rectal at bedtime  influenza  Vaccine (HIGH DOSE) 0.5 milliLiter(s) IntraMuscular once  insulin glargine Injectable (LANTUS) 10 Unit(s) SubCutaneous <User Schedule>  insulin lispro (ADMELOG) corrective regimen sliding scale   SubCutaneous every 6 hours  lactobacillus acidophilus 1 Tablet(s) Oral <User Schedule>  levothyroxine 125 MICROGram(s) Oral <User Schedule>  lidocaine   4% Patch 4 Patch Transdermal every 24 hours  melatonin 12 milliGRAM(s) Oral <User Schedule>  nystatin Powder 1 Application(s) Topical every 8 hours  pantoprazole  Injectable 40 milliGRAM(s) IV Push every 12 hours  predniSONE  Solution 5 milliGRAM(s) Enteral Tube <User Schedule>  QUEtiapine 12.5 milliGRAM(s) Oral <User Schedule>  sodium chloride 1 Gram(s) Oral every 8 hours  sodium chloride 0.65% Nasal 1 Spray(s) Both Nostrils every 6 hours  vancomycin    Solution 125 milliGRAM(s) Oral every 6 hours  witch hazel Pads 1 Application(s) Topical every 12 hours        insulin glargine Injectable (LANTUS)   10 Unit(s) SubCutaneous (11-11-24 @ 06:57)    insulin lispro (ADMELOG) corrective regimen sliding scale   1 Unit(s) SubCutaneous (11-11-24 @ 11:23)   1 Unit(s) SubCutaneous (11-11-24 @ 00:41)   2 Unit(s) SubCutaneous (11-10-24 @ 19:45)    insulin lispro Injectable (ADMELOG)   26 Unit(s) SubCutaneous (11-11-24 @ 06:58)    insulin lispro Injectable (ADMELOG)   42 Unit(s) SubCutaneous (11-11-24 @ 13:08)    levothyroxine   125 MICROGram(s) Oral (11-11-24 @ 04:25)    predniSONE  Solution   5 milliGRAM(s) Enteral Tube (11-11-24 @ 13:08)        PHYSICAL EXAM:  VITALS: T(C): 36.7 (11-11-24 @ 10:44)  T(F): 98 (11-11-24 @ 10:44), Max: 102.8 (11-11-24 @ 05:11)  HR: 114 (11-11-24 @ 10:44) (94 - 130)  BP: 92/53 (11-11-24 @ 10:44) (92/53 - 137/69)  RR:  (18 - 20)  SpO2:  (99% - 100%)  Wt(kg): --  GENERAL: NAD  Respiratory: Respirations unlabored   Extremities: Warm, no edema  NEURO:asleep, not particpating in exam    LABS:  POCT Blood Glucose.: 184 mg/dL (11-11-24 @ 13:05)  POCT Blood Glucose.: 247 mg/dL (11-11-24 @ 11:12)  POCT Blood Glucose.: 153 mg/dL (11-11-24 @ 06:48)  POCT Blood Glucose.: 248 mg/dL (11-10-24 @ 23:59)  POCT Blood Glucose.: 273 mg/dL (11-10-24 @ 19:38)  POCT Blood Glucose.: 281 mg/dL (11-10-24 @ 17:41)  POCT Blood Glucose.: 190 mg/dL (11-10-24 @ 12:02)  POCT Blood Glucose.: 105 mg/dL (11-10-24 @ 05:23)  POCT Blood Glucose.: 173 mg/dL (11-10-24 @ 00:23)  POCT Blood Glucose.: 301 mg/dL (11-09-24 @ 17:03)  POCT Blood Glucose.: 240 mg/dL (11-09-24 @ 11:45)  POCT Blood Glucose.: 103 mg/dL (11-09-24 @ 05:16)  POCT Blood Glucose.: 199 mg/dL (11-08-24 @ 23:36)  POCT Blood Glucose.: 248 mg/dL (11-08-24 @ 17:33)                          7.9    21.43 )-----------( 202      ( 11 Nov 2024 07:13 )             27.9     11-11    135  |  104  |  24[H]  ----------------------------<  148[H]  3.4[L]   |  15[L]  |  <0.30[L]    Ca    8.2[L]      11 Nov 2024 07:13  Phos  2.2     11-11  Mg     1.8     11-11    TPro  5.5[L]  /  Alb  2.0[L]  /  TBili  0.7  /  DBili  x   /  AST  20  /  ALT  13  /  AlkPhos  150[H]  11-11    eGFR: 113 mL/min/1.73m2 (11 Nov 2024 07:13)      Thyroid Function Tests:  10-16 @ 06:50 TSH 1.74 FreeT4 1.2 T3 -- Anti TPO -- Anti Thyroglobulin Ab -- TSI --      A1C with Estimated Average Glucose Result: 5.8 % (10-14-24 @ 05:08)    Estimated Average Glucose: 120 mg/dL (10-14-24 @ 05:08)

## 2024-11-11 NOTE — PROGRESS NOTE ADULT - PROBLEM SELECTOR PLAN 12
- Patients daughter updated at bedside by RCU Team today; d/w daughter Tentative dc planning for 11/12  - Daughter states she will reach out to [facility] to check availability - Patients daughter updated at bedside by RCU Team today  - Daughter states she will reach out to [facility] to check availability

## 2024-11-11 NOTE — PROGRESS NOTE ADULT - NS ATTEND AMEND GEN_ALL_CORE FT
75 yo F w/ PMH DM2 (insulin-dependent), HTN, HLD, hypothyroidism, RA on Prednisone, fibromyalgia, cerebral aneurysm s/p repair, chronic hypoxemia and hypercapnic respiratory failure s/p trach, vent-dependent, recurrent C diff infection, oropharyngeal dysphagia s/p G-J tube with persistent GJ tube leaks now s/p GC fistula repair 8/12, and recent presentation 5 days PTA for rectal bleeding requiring transfusion in the ED who now presents with acute hypoxemic respiratory distress 2/2 RML PNA, admitted to the RCU for further management. Found to have Pseudomonas aeruginosa in sputum culture and urine culture with ESBL E. coli s/p course of meropenem. Course complicated by antibiotic-associated diarrhea.  Now with increased TFs output from PEG and diarrhea, concern for aspiration d/t copious output (noted fluid in stomach on bedside US).        - C/W current pain regimen, well controlled. C/W escitalopram and seroquel  - pt remains lethargic  but episodically back to baseline, likely septic encephalopathy  - CTH w/o acute changes  - continue lung protective ventilation. Does poorly on PS trials. Continue Duonebs.   - cont PO Vanc for Cdiff, f/u ID reccs (prior cx's w/ PsA sputum and esbl ecoli uti)  - Cefepime stopped 11/10 per family request d/t concern affecting mental status  - D/w ID starting Ceftazidime given worsening leukocytosis/increased procal  - TFs on hold, Dr. Vázquez with GI reconsulted for tube check and requesting repeat CT Abd/Pelvis  - Anemia multifactorial, hemorrhoids, AOCD. s/p EGD without active bleeding, f/u Heme reccs  - cont to monitor FS  , TFs on hold, will need D5 gtt and FS q4h - continue Lantus 10, hold Admelog  -  RA, on home prednisone 5 mg daily   - monitor hypoNa, tsh normal   - cont wound care (and f/u team reccs) to sacrum for decub (also likely moisture related rash areas on back)   - DVT ppx- only scd for now given significant anemia a few days ago

## 2024-11-11 NOTE — PROGRESS NOTE ADULT - ASSESSMENT
73 yo F PMH T2DM on insulin, HTN, HLD, hypothyroidism, RA on Prednisone, Fibromyalgia, cerebral aneurysm s/p repair, chronic hypercapnic respiratory failure s/p trach (vent dependent) and chronic PEG 2022, recurrent C. diff infection (follows with Dr. 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  Pt also noted to have rectal bleeding this past week requiring transfusion 5 days ago in ED as per daughter. Daughter also reports brownish discharge from G-J tube. In ED, pt afebrile, fiO2 96-98% on 40% on ventilator.  Chest X-ray w/ opacification of right lung concerning for possible PNA.  Admitted to RCU for further management. Sputum cxs grew PSA; treated with course of Cefepime. Patient with decreased H+H received 1 unit of PRBCS on 10/10.  10/14 EGD w/ Dr. Rosales for PEGJ exchange and clippings placed for closure of fistula site.  Persistent fevers treated with meropenem and vanc (d/c'd 10/20).  CT A/P without infectious source.  Continued fevers and persistent leukocytosis 11/1 CDiff positive - po vanco started, IV flagyl added (11/3-11/8). Head CT performed on 11/6 due to concern of lethargy no acute intracranial findings were noted; likely related to metabolic encephalopathy. Patient continued to have persistent fevers and was empirically treated with Cefepime (11/5-11/9) for CR PSA in sputum cx. CT C/A/P performed 11/6 c/w consolidative opacity LLL. Continue airway management with duonebs, chest PT and suction PRN. Febrile morning 11/10 -- ofirmev given, pan cultures sent. Tube study requested to evaluate for J-tube migration. 73 yo F PMH T2DM on insulin, HTN, HLD, hypothyroidism, RA on Prednisone, Fibromyalgia, cerebral aneurysm s/p repair, chronic hypercapnic respiratory failure s/p trach (vent dependent) and chronic PEG 2022, recurrent C. diff infection (follows with Dr. 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  Pt also noted to have rectal bleeding this past week requiring transfusion 5 days ago in ED as per daughter. Daughter also reports brownish discharge from G-J tube. In ED, pt afebrile, fiO2 96-98% on 40% on ventilator.  Chest X-ray w/ opacification of right lung concerning for possible PNA.  Admitted to RCU for further management. Sputum cxs grew PSA; treated with course of Cefepime. Patient with decreased H+H received 1 unit of PRBCS on 10/10.  10/14 EGD w/ Dr. Rosales for PEGJ exchange and clippings placed for closure of fistula site.  Persistent fevers treated with meropenem and vanc (d/c'd 10/20).  CT A/P without infectious source.  Continued fevers and persistent leukocytosis 11/1 CDiff positive - po vanco started, IV flagyl added (11/3-11/8). Head CT performed on 11/6 due to concern of lethargy no acute intracranial findings were noted; likely related to metabolic encephalopathy. Patient continued to have persistent fevers and was empirically treated with Cefepime (11/5-11/9) for CR PSA in sputum cx. CT C/A/P performed 11/6 c/w consolidative opacity LLL. Continue airway management with duonebs, chest PT and suction PRN.

## 2024-11-11 NOTE — PROGRESS NOTE ADULT - SUBJECTIVE AND OBJECTIVE BOX
Follow Up:  fevers    Interval History/ROS:  lethargy, no distress    Allergies  walnut (Unknown)  metronidazole (Rash)  Lyrica (Unknown)  penicillin (Unknown)  Pineapple (Unknown)  Tagamet (Unknown)  heparin (Unknown)  Pecans (Unknown)  Hazelnut (Unknown)  meropenem (Rash)      ANTIMICROBIALS:  cefepime   IVPB 2000 every 8 hours  erythromycin   IVPB 160 every 8 hours  vancomycin    Solution 125 every 6 hours      OTHER MEDS:  MEDICATIONS  (STANDING):  acetaminophen   Oral Liquid .. 650 every 6 hours PRN  albuterol/ipratropium for Nebulization 3 every 6 hours  aluminum hydroxide/magnesium hydroxide/simethicone Suspension 30 every 4 hours PRN  dextrose 50% Injectable 25 once  dextrose 50% Injectable 25 once  dextrose 50% Injectable 12.5 once  escitalopram 10 <User Schedule>  gabapentin Solution 250 <User Schedule>  glucagon  Injectable 1 once  glucagon  Injectable 1 once  influenza  Vaccine (HIGH DOSE) 0.5 once  insulin glargine Injectable (LANTUS) 10 <User Schedule>  insulin lispro (ADMELOG) corrective regimen sliding scale  every 6 hours  levothyroxine 125 <User Schedule>  melatonin 12 <User Schedule>  oxyCODONE    Solution 10 every 6 hours PRN  oxyCODONE    Solution 2.5 every 6 hours PRN  oxyCODONE    Solution 5 every 6 hours PRN  pantoprazole  Injectable 40 every 12 hours  predniSONE  Solution 5 <User Schedule>  QUEtiapine 12.5 <User Schedule>      Vital Signs Last 24 Hrs  T(C): 36.2 (11 Nov 2024 17:45), Max: 39.3 (11 Nov 2024 05:11)  T(F): 97.2 (11 Nov 2024 17:45), Max: 102.8 (11 Nov 2024 05:11)  HR: 103 (11 Nov 2024 17:45) (99 - 130)  BP: 161/84 (11 Nov 2024 17:45) (92/53 - 161/84)  BP(mean): --  RR: 18 (11 Nov 2024 17:45) (18 - 20)  SpO2: 100% (11 Nov 2024 17:45) (100% - 100%)    Parameters below as of 11 Nov 2024 17:45  Patient On (Oxygen Delivery Method): ventilator        PHYSICAL EXAM:  General:  NAD, Non-toxic  Neurology: A&Ox3, nonfocal  Respiratory: Clear to auscultation bilaterally  CV: RRR, S1S2, no murmurs, rubs or gallops  Abdominal: Soft, Non-tender, non-distended, normal bowel sounds  Extremities: moderate generalized edema]  Line Sites: Clear  Skin: No rash                          7.9    21.43 )-----------( 202      ( 11 Nov 2024 07:13 )             27.9       11-11    135  |  104  |  24[H]  ----------------------------<  148[H]  3.4[L]   |  15[L]  |  <0.30[L]    Ca    8.2[L]      11 Nov 2024 07:13  Phos  2.2     11-11  Mg     1.8     11-11    TPro  5.5[L]  /  Alb  2.0[L]  /  TBili  0.7  /  DBili  x   /  AST  20  /  ALT  13  /  AlkPhos  150[H]  11-11      Urinalysis Basic - ( 11 Nov 2024 07:13 )    Color: x / Appearance: x / SG: x / pH: x  Gluc: 148 mg/dL / Ketone: x  / Bili: x / Urobili: x   Blood: x / Protein: x / Nitrite: x   Leuk Esterase: x / RBC: x / WBC x   Sq Epi: x / Non Sq Epi: x / Bacteria: x        MICROBIOLOGY:  Trach Asp Tracheal Aspirate  11-10-24 --  --    Numerous polymorphonuclear leukocytes per low power field  Rare Squamous epithelial cells per low power field  Rare Gram positive cocci in pairs per oil power field  Few Gram Negative Rods per oil power field  Few Gram Positive Rods per oil power field      .Blood BLOOD  11-10-24   No growth at 24 hours  --  --      .Sputum Sputum  11-04-24   Moderate Mixed gram negative rods including  Moderate Pseudomonas aeruginosa  Commensal vinay consistent with body site  --  Pseudomonas aeruginosa      Clean Catch Clean Catch (Midstream)  11-03-24   No growth  --  --      .Blood BLOOD  11-03-24   No growth at 5 days  --  --      .Blood BLOOD  11-03-24   No growth at 5 days  --  --      .Blood BLOOD  11-01-24   Growth in anaerobic bottle: Staphylococcus epidermidis  Isolation of Coagulase negative Staphylococcus from single blood culture  sets may represent  contamination. Contact the Microbiology Department at [phone] if  susceptibility testing is needed.  clinically indicated.  Direct identification is available within approximately 3-5  hours either by Blood Panel Multiplexed PCR or Direct  MALDI-TOF. Details: [email or web address]  --  Blood Culture PCR      Catheterized Catheterized  11-01-24   No growth  --  --      .Blood BLOOD  11-01-24   No growth at 5 days  --  --      Trach Asp Tracheal Aspirate  10-27-24   Moderate Pseudomonas aeruginosa  Moderate Pseudomonas aeruginosa #2  Multiple Morphological Strains  Commensal vinay consistent with body site  --  Pseudomonas aeruginosa  Pseudomonas aeruginosa      .Blood BLOOD  10-27-24   No growth at 5 days  --  --      .Blood BLOOD  10-17-24   No growth at 5 days  --  --      .Blood BLOOD  10-17-24   No growth at 5 days  --  --      .Blood BLOOD  10-15-24   No growth at 5 days  --  --      Trach Asp Tracheal Aspirate  10-15-24   Mixed gram negative rods including  Numerous Pseudomonas aeruginosa  Commensal vinay consistent with body site  --  Pseudomonas aeruginosa      .Blood BLOOD  10-15-24   No growth at 5 days  --  --    RADIOLOGY:  < from: [facility] Duplex Lower Ext Vein Scan, Bilat (11.11.24 @ 16:18) >  IMPRESSION:  No evidence of deep venous thrombosis in either lower extremity.    < end of copied text >  < from: Xray Feeding Tube Check (11.09.24 @ 19:18) >  IMPRESSION:  Percutaneous gastrojejunostomy catheter terminates in the proximal   duodenum.    < end of copied text >      Zion Newman MD; Division of Infectious Disease; Pager: [phone]; nights and weekends: [phone]

## 2024-11-11 NOTE — PROGRESS NOTE ADULT - NUTRITIONAL ASSESSMENT
Diet, NPO with Tube Feed:   Tube Feeding Modality: Jejunostomy  [employer] Peptide 1.5 (KFPEPT1.5RTH)  Total Volume for 24 Hours (mL): 960  Continuous  Until Goal Tube Feed Rate (mL per Hour): 80  Tube Feed Duration (in Hours): 12  Tube Feed Start Time: 06:00  Free Water Flush  Pump   Rate (mL per Hour): 50   Frequency: Every Hour  Free Water Flush Instructions:  50ml free water flushes Q1hr  Alfred(7 Gm Arginine/7 Gm Glut/1.2 Gm HMB     Qty per Day:  2  No Carb Prosource (1pkg = 15gms Protein)     Qty per Day:  1  Banatrol TF     Qty per Day:  1/2 packet per daily (11-01-24 @ 08:28) [Active]

## 2024-11-11 NOTE — PROGRESS NOTE ADULT - PROBLEM SELECTOR PLAN 1
Inpatient Plan:  - Please monitor blood glucose values q 6 hours while on tube feeding or NPO  - C/w Lantus to 12u @0600  - Hold Admelog to 24u @0600 and 40u @1200 (if BG <100s administer 9u @0600 or 19u @1200; Hold if TFs are on hold)  -Agree with D5 infusion while TF are held.   - Start adjust low dose Admelog correctional scale q6h while on TFs and overnight    Discharge Plan:  - TBD depending on insulin requirement and discharge tube feeding regimen (Bolus vs continuous tube feeding)   - If bolus tube feeding > Lantus plus Humalog   - Patient should have BGs checked 4x a day while on TFs.    Daughter aware to contact Endocrinologist if BG <70mg/dL x1 or >200mg/dL consistently or >400mg/dL x1.   - Followup with Dr Ruth: [facility]: [address] [place] [address]. [address]. [place] [address]. Tel: [phone]- Telemedicine- daughter to schedule.   - Make sure pt has Rx for all DM supplies and insulin

## 2024-11-11 NOTE — PROGRESS NOTE ADULT - ASSESSMENT
75 yo F w/h/o controlled T2DM (A1C 5.8%) on insulin at home. Also HTN, HLD, hypothyroidism, RA on Prednisone, Fibromyalgia, cerebral aneurysm s/p repair, chronic hypercapnic respiratory failure s/p trach (vent dependent) and chronic PEG 2022, recurrent C. diff infection (follows with Dr. 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 S/p antibiotics and s/p GJ tube exchanges on 10/14, s/p PEG replacement 10/21.     Endocrine consulted for Type 2 DM management while on tube feeding.  tube feeding ([employer] Peptide 1.5 (KFPEPT1.5RTH) at 80 ml/hr)  for 12 hours (6A-6P), have been put on hold as of this afternoon due to concerns for aspiration per primary team. Positive for diarrhea , rectal tube in place. +C Diff. Patient placed on D5 ivfluid. Team will advise when TF are restarted.  Endocrine will closely monitor BG and adjust insulin as needed for BG goal 100-180mg/dL inpatient.          Home DM medications: Lantus 35 units at HS, Humalog 26 units with # 1 feeding, 22 units with #2 tube feeding, 20 units with #3 tube feeding and  Humalog correction scale (  2 units for -385, 4 units for -250, 6 units for -300, 8units for -350, 10 units for -400, 12 units for -450)   while on [employer] formula 3 cans/day, slow bolus( run at 80 ml/hr total volume 960 ml/day> 1st can around 6:30-8:30, 2nd can around 3 pm, 3rd can around 8-9 pm) plus Banatrol 1/2 packet BID, was increasing to 1 packet BID, plus Kefir 10 oz/day ( divided in multiple times throughout the day).

## 2024-11-11 NOTE — PROGRESS NOTE ADULT - SUBJECTIVE AND OBJECTIVE BOX
Patient is a 74y old  Female who presents with a chief complaint of Patient admitted with Hypoxemia and episode of Bright red blood per rectum (10 Nov 2024 07:22)      Interval Events:    REVIEW OF SYSTEMS:  [ ] Positive  [ ] All other systems negative  [ ] Unable to assess ROS because ________    Vital Signs Last 24 Hrs  T(C): 37.4 (24 @ 06:55), Max: 39.3 (24 @ 05:11)  T(F): 99.4 (24 @ 06:55), Max: 102.8 (24 @ 05:11)  HR: 125 (24 @ 06:00) (94 - 132)  BP: 118/56 (24 @ 05:11) (96/51 - 137/69)  RR: 20 (24 @ 05:11) (18 - 22)  SpO2: 100% (24 @ 06:00) (99% - 100%)    PHYSICAL EXAM:  HEENT:   [ ]Tracheostomy:  [ ]Pupils equal  [ ]No oral lesions  [ ]Abnormal    SKIN  [ ]No Rash  [ ] Abnormal  [ ] pressure    CARDIAC  [ ]Regular  [ ]Abnormal    PULMONARY  [ ]Bilateral Clear Breath Sounds  [ ]Normal Excursion  [ ]Abnormal    GI  [ ]PEG      [ ] +BS		              [ ]Soft, nondistended, nontender	  [ ]Abnormal    MUSCULOSKELETAL                                   [ ]Bedbound                 [ ]Abnormal    [ ]Ambulatory/OOB to chair                           EXTREMITIES                                         [ ]Normal  [ ]Edema                           NEUROLOGIC  [ ] Normal, non focal  [ ] Focal findings:    PSYCHIATRIC  [ ]Alert and appropriate  [ ] Sedated	 [ ]Agitated    :  Mcbride: [ ] Yes, if yes: Date of Placement:                   [  ] No    LINES: Central Lines [ ] Yes, if yes: Date of Placement                                     [  ] No    HOSPITAL MEDICATIONS:  MEDICATIONS  (STANDING):  albuterol/ipratropium for Nebulization 3 milliLiter(s) Nebulizer every 6 hours  AQUAPHOR (petrolatum Ointment) 1 Application(s) Topical once  artificial  tears Solution 1 Drop(s) Both EYES every 6 hours  ascorbic acid 500 milliGRAM(s) Oral daily  Biotene Dry Mouth Oral Rinse 5 milliLiter(s) Swish and Spit every 6 hours  calcium carbonate 1250 mG  + Vitamin D (OsCal 500 + D) 1 Tablet(s) Oral <User Schedule>  chlorhexidine 0.12% Liquid 15 milliLiter(s) Oral Mucosa every 12 hours  chlorhexidine 4% Liquid 1 Application(s) Topical daily  dextrose 5%. 1000 milliLiter(s) (50 mL/Hr) IV Continuous <Continuous>  dextrose 5%. 1000 milliLiter(s) (50 mL/Hr) IV Continuous <Continuous>  dextrose 5%. 1000 milliLiter(s) (100 mL/Hr) IV Continuous <Continuous>  dextrose 5%. 1000 milliLiter(s) (100 mL/Hr) IV Continuous <Continuous>  dextrose 50% Injectable 12.5 Gram(s) IV Push once  dextrose 50% Injectable 25 Gram(s) IV Push once  dextrose 50% Injectable 25 Gram(s) IV Push once  diclofenac sodium 1% Gel 2 Gram(s) Topical every 6 hours  escitalopram 10 milliGRAM(s) Oral <User Schedule>  gabapentin Solution 250 milliGRAM(s) Oral <User Schedule>  glucagon  Injectable 1 milliGRAM(s) IntraMuscular once  glucagon  Injectable 1 milliGRAM(s) IntraMuscular once  hemorrhoidal Ointment 1 Application(s) Rectal at bedtime  influenza  Vaccine (HIGH DOSE) 0.5 milliLiter(s) IntraMuscular once  insulin glargine Injectable (LANTUS) 10 Unit(s) SubCutaneous <User Schedule>  insulin lispro (ADMELOG) corrective regimen sliding scale   SubCutaneous every 6 hours  insulin lispro Injectable (ADMELOG) 26 Unit(s) SubCutaneous <User Schedule>  insulin lispro Injectable (ADMELOG) 42 Unit(s) SubCutaneous <User Schedule>  lactobacillus acidophilus 1 Tablet(s) Oral <User Schedule>  levothyroxine 125 MICROGram(s) Oral <User Schedule>  lidocaine   4% Patch 4 Patch Transdermal every 24 hours  melatonin 12 milliGRAM(s) Oral <User Schedule>  nystatin Powder 1 Application(s) Topical every 8 hours  pantoprazole  Injectable 40 milliGRAM(s) IV Push every 12 hours  predniSONE  Solution 5 milliGRAM(s) Enteral Tube <User Schedule>  QUEtiapine 12.5 milliGRAM(s) Oral <User Schedule>  sodium chloride 1 Gram(s) Oral every 8 hours  sodium chloride 0.65% Nasal 1 Spray(s) Both Nostrils every 6 hours  vancomycin    Solution 125 milliGRAM(s) Oral every 6 hours  witch hazel Pads 1 Application(s) Topical every 12 hours    MEDICATIONS  (PRN):  acetaminophen   Oral Liquid .. 650 milliGRAM(s) Oral every 6 hours PRN Temp greater or equal to 38C (100.4F), Mild Pain (1 - 3)  aluminum hydroxide/magnesium hydroxide/simethicone Suspension 30 milliLiter(s) Enteral Tube every 4 hours PRN Dyspepsia  oxyCODONE    Solution 10 milliGRAM(s) Oral every 6 hours PRN Severe Pain (7 - 10)  oxyCODONE    Solution 2.5 milliGRAM(s) Oral every 6 hours PRN Mild Pain (1 - 3)  oxyCODONE    Solution 5 milliGRAM(s) Oral every 6 hours PRN Moderate Pain (4 - 6)      LABS:                        8.5    15.65 )-----------( 202      ( 10 Nov 2024 07:02 )             29.8     11-10    133[L]  |  102  |  23  ----------------------------<  109[H]  3.9   |  19[L]  |  <0.30[L]    Ca    8.6      10 Nov 2024 07:02  Phos  2.3     11-10  Mg     2.0     11-10    TPro  6.4  /  Alb  2.1[L]  /  TBili  0.6  /  DBili  x   /  AST  20  /  ALT  15  /  AlkPhos  164[H]  11-10      Urinalysis Basic - ( 10 Nov 2024 23:21 )    Color: Yellow / Appearance: Clear / S.019 / pH: x  Gluc: x / Ketone: Negative mg/dL  / Bili: Negative / Urobili: 0.2 mg/dL   Blood: x / Protein: 30 mg/dL / Nitrite: Negative   Leuk Esterase: Negative / RBC: 7 /HPF / WBC 5 /HPF   Sq Epi: x / Non Sq Epi: 9 /HPF / Bacteria: Negative /HPF          CAPILLARY BLOOD GLUCOSE    MICROBIOLOGY:     RADIOLOGY:  [ ] Reviewed and interpreted by me    Mode: AC/ CMV (Assist Control/ Continuous Mandatory Ventilation)  RR (machine): 18  TV (machine): 350  FiO2: 30  PEEP: 5  ITime: 1  MAP: 11  PIP: 31   Patient is a 74y old  Female who presents with a chief complaint of Patient admitted with Hypoxemia and episode of Bright red blood per rectum (10 Nov 2024 07:22)      Interval Events:    REVIEW OF SYSTEMS:  [ ] Positive  [ ] All other systems negative  [ ] Unable to assess ROS because ________    Vital Signs Last 24 Hrs  T(C): 37.4 (24 @ 06:55), Max: 39.3 (24 @ 05:11)  T(F): 99.4 (24 @ 06:55), Max: 102.8 (24 @ 05:11)  HR: 125 (24 @ 06:00) (94 - 132)  BP: 118/56 (24 @ 05:11) (96/51 - 137/69)  RR: 20 (24 @ 05:11) (18 - 22)  SpO2: 100% (24 @ 06:00) (99% - 100%)    PHYSICAL EXAM:  HEENT:   [X] Tracheostomy: #8 XLT cuffed Shiley   [X] PERRLA  [ ] No oral lesions  [ ] Abnormal    SKIN  [ ] No Rash  [ ] Abnormal  [X] pressure - sacral unstageable    CARDIAC  [X] Regular  [ ] Abnormal    PULMONARY  [ ] Bilateral Clear Breath Sounds  [ ] Normal Excursion  [X] Abnormal - mild coarse BS     GI  [X] PEG        [X] +BS		              [X] Soft, nondistended, nontender	  [X] Abnormal - old PEG site c/d/i, + rectal tube     MUSCULOSKELETAL                                   [X] Bedbound                 [ ] Abnormal    [ ] Ambulatory/OOB to chair                           EXTREMITIES                                         [ ] Normal  [X] Edema - mild generalized pitting edema                          NEUROLOGIC  [ ] Normal, non focal  [X] Focal findings: lethargic    PSYCHIATRIC  [x] Lethargic  [ ] Sedated	 [ ]Agitated    :  Mcbride: [ ] Yes, if yes: Date of Placement:                   [X] No    LINES: Central Lines [ ] Yes, if yes: Date of Placement                                     [X] No        HOSPITAL MEDICATIONS:  MEDICATIONS  (STANDING):  albuterol/ipratropium for Nebulization 3 milliLiter(s) Nebulizer every 6 hours  AQUAPHOR (petrolatum Ointment) 1 Application(s) Topical once  artificial  tears Solution 1 Drop(s) Both EYES every 6 hours  ascorbic acid 500 milliGRAM(s) Oral daily  Biotene Dry Mouth Oral Rinse 5 milliLiter(s) Swish and Spit every 6 hours  calcium carbonate 1250 mG  + Vitamin D (OsCal 500 + D) 1 Tablet(s) Oral <User Schedule>  chlorhexidine 0.12% Liquid 15 milliLiter(s) Oral Mucosa every 12 hours  chlorhexidine 4% Liquid 1 Application(s) Topical daily  dextrose 5%. 1000 milliLiter(s) (50 mL/Hr) IV Continuous <Continuous>  dextrose 5%. 1000 milliLiter(s) (50 mL/Hr) IV Continuous <Continuous>  dextrose 5%. 1000 milliLiter(s) (100 mL/Hr) IV Continuous <Continuous>  dextrose 5%. 1000 milliLiter(s) (100 mL/Hr) IV Continuous <Continuous>  dextrose 50% Injectable 12.5 Gram(s) IV Push once  dextrose 50% Injectable 25 Gram(s) IV Push once  dextrose 50% Injectable 25 Gram(s) IV Push once  diclofenac sodium 1% Gel 2 Gram(s) Topical every 6 hours  escitalopram 10 milliGRAM(s) Oral <User Schedule>  gabapentin Solution 250 milliGRAM(s) Oral <User Schedule>  glucagon  Injectable 1 milliGRAM(s) IntraMuscular once  glucagon  Injectable 1 milliGRAM(s) IntraMuscular once  hemorrhoidal Ointment 1 Application(s) Rectal at bedtime  influenza  Vaccine (HIGH DOSE) 0.5 milliLiter(s) IntraMuscular once  insulin glargine Injectable (LANTUS) 10 Unit(s) SubCutaneous <User Schedule>  insulin lispro (ADMELOG) corrective regimen sliding scale   SubCutaneous every 6 hours  insulin lispro Injectable (ADMELOG) 26 Unit(s) SubCutaneous <User Schedule>  insulin lispro Injectable (ADMELOG) 42 Unit(s) SubCutaneous <User Schedule>  lactobacillus acidophilus 1 Tablet(s) Oral <User Schedule>  levothyroxine 125 MICROGram(s) Oral <User Schedule>  lidocaine   4% Patch 4 Patch Transdermal every 24 hours  melatonin 12 milliGRAM(s) Oral <User Schedule>  nystatin Powder 1 Application(s) Topical every 8 hours  pantoprazole  Injectable 40 milliGRAM(s) IV Push every 12 hours  predniSONE  Solution 5 milliGRAM(s) Enteral Tube <User Schedule>  QUEtiapine 12.5 milliGRAM(s) Oral <User Schedule>  sodium chloride 1 Gram(s) Oral every 8 hours  sodium chloride 0.65% Nasal 1 Spray(s) Both Nostrils every 6 hours  vancomycin    Solution 125 milliGRAM(s) Oral every 6 hours  witch hazel Pads 1 Application(s) Topical every 12 hours    MEDICATIONS  (PRN):  acetaminophen   Oral Liquid .. 650 milliGRAM(s) Oral every 6 hours PRN Temp greater or equal to 38C (100.4F), Mild Pain (1 - 3)  aluminum hydroxide/magnesium hydroxide/simethicone Suspension 30 milliLiter(s) Enteral Tube every 4 hours PRN Dyspepsia  oxyCODONE    Solution 10 milliGRAM(s) Oral every 6 hours PRN Severe Pain (7 - 10)  oxyCODONE    Solution 2.5 milliGRAM(s) Oral every 6 hours PRN Mild Pain (1 - 3)  oxyCODONE    Solution 5 milliGRAM(s) Oral every 6 hours PRN Moderate Pain (4 - 6)      LABS:                        8.5    15.65 )-----------( 202      ( 10 Nov 2024 07:02 )             29.8     11-10    133[L]  |  102  |  23  ----------------------------<  109[H]  3.9   |  19[L]  |  <0.30[L]    Ca    8.6      10 Nov 2024 07:02  Phos  2.3     11-10  Mg     2.0     11-10    TPro  6.4  /  Alb  2.1[L]  /  TBili  0.6  /  DBili  x   /  AST  20  /  ALT  15  /  AlkPhos  164[H]  11-10      Urinalysis Basic - ( 10 Nov 2024 23:21 )    Color: Yellow / Appearance: Clear / S.019 / pH: x  Gluc: x / Ketone: Negative mg/dL  / Bili: Negative / Urobili: 0.2 mg/dL   Blood: x / Protein: 30 mg/dL / Nitrite: Negative   Leuk Esterase: Negative / RBC: 7 /HPF / WBC 5 /HPF   Sq Epi: x / Non Sq Epi: 9 /HPF / Bacteria: Negative /HPF          CAPILLARY BLOOD GLUCOSE    MICROBIOLOGY:     RADIOLOGY:  [ ] Reviewed and interpreted by me    Mode: AC/ CMV (Assist Control/ Continuous Mandatory Ventilation)  RR (machine): 18  TV (machine): 350  FiO2: 30  PEEP: 5  ITime: 1  MAP: 11  PIP: 31   Patient is a 74y old  Female who presents with a chief complaint of Patient admitted with Hypoxemia and episode of Bright red blood per rectum (10 Nov 2024 07:22)      Interval Events:    REVIEW OF SYSTEMS:  [ ] Positive  [ ] All other systems negative  [ ] Unable to assess ROS because ________    Vital Signs Last 24 Hrs  T(C): 37.4 (24 @ 06:55), Max: 39.3 (24 @ 05:11)  T(F): 99.4 (24 @ 06:55), Max: 102.8 (24 @ 05:11)  HR: 125 (24 @ 06:00) (94 - 132)  BP: 118/56 (24 @ 05:11) (96/51 - 137/69)  RR: 20 (24 @ 05:11) (18 - 22)  SpO2: 100% (24 @ 06:00) (99% - 100%)    PHYSICAL EXAM:  HEENT:   [X] Tracheostomy: #8 XLT cuffed Shiley   [X] PERRLA  [ ] No oral lesions  [ ] Abnormal    SKIN  [ ] No Rash  [ ] Abnormal  [X] pressure - sacral unstageable    CARDIAC  [X] Regular  [ ] Abnormal    PULMONARY  [ ] Bilateral Clear Breath Sounds  [ ] Normal Excursion  [X] Abnormal - mild coarse BS     GI  [X] PEG        [X] decreased BS  [X] increased output from Jtube		              [X] Soft, nondistended, nontender	  [X] Abnormal - old PEG site c/d/i, + rectal tube     MUSCULOSKELETAL                                   [X] Bedbound                 [ ] Abnormal    [ ] Ambulatory/OOB to chair                           EXTREMITIES                                         [ ] Normal  [X] Edema - mild generalized pitting edema                          NEUROLOGIC  [ ] Normal, non focal  [X] Focal findings: lethargic    PSYCHIATRIC  [x] Lethargic  [ ] Sedated	 [ ]Agitated    :  Mcbride: [ ] Yes, if yes: Date of Placement:                   [X] No    LINES: Central Lines [ ] Yes, if yes: Date of Placement                                     [X] No        HOSPITAL MEDICATIONS:  MEDICATIONS  (STANDING):  albuterol/ipratropium for Nebulization 3 milliLiter(s) Nebulizer every 6 hours  AQUAPHOR (petrolatum Ointment) 1 Application(s) Topical once  artificial  tears Solution 1 Drop(s) Both EYES every 6 hours  ascorbic acid 500 milliGRAM(s) Oral daily  Biotene Dry Mouth Oral Rinse 5 milliLiter(s) Swish and Spit every 6 hours  calcium carbonate 1250 mG  + Vitamin D (OsCal 500 + D) 1 Tablet(s) Oral <User Schedule>  chlorhexidine 0.12% Liquid 15 milliLiter(s) Oral Mucosa every 12 hours  chlorhexidine 4% Liquid 1 Application(s) Topical daily  dextrose 5%. 1000 milliLiter(s) (50 mL/Hr) IV Continuous <Continuous>  dextrose 5%. 1000 milliLiter(s) (50 mL/Hr) IV Continuous <Continuous>  dextrose 5%. 1000 milliLiter(s) (100 mL/Hr) IV Continuous <Continuous>  dextrose 5%. 1000 milliLiter(s) (100 mL/Hr) IV Continuous <Continuous>  dextrose 50% Injectable 12.5 Gram(s) IV Push once  dextrose 50% Injectable 25 Gram(s) IV Push once  dextrose 50% Injectable 25 Gram(s) IV Push once  diclofenac sodium 1% Gel 2 Gram(s) Topical every 6 hours  escitalopram 10 milliGRAM(s) Oral <User Schedule>  gabapentin Solution 250 milliGRAM(s) Oral <User Schedule>  glucagon  Injectable 1 milliGRAM(s) IntraMuscular once  glucagon  Injectable 1 milliGRAM(s) IntraMuscular once  hemorrhoidal Ointment 1 Application(s) Rectal at bedtime  influenza  Vaccine (HIGH DOSE) 0.5 milliLiter(s) IntraMuscular once  insulin glargine Injectable (LANTUS) 10 Unit(s) SubCutaneous <User Schedule>  insulin lispro (ADMELOG) corrective regimen sliding scale   SubCutaneous every 6 hours  insulin lispro Injectable (ADMELOG) 26 Unit(s) SubCutaneous <User Schedule>  insulin lispro Injectable (ADMELOG) 42 Unit(s) SubCutaneous <User Schedule>  lactobacillus acidophilus 1 Tablet(s) Oral <User Schedule>  levothyroxine 125 MICROGram(s) Oral <User Schedule>  lidocaine   4% Patch 4 Patch Transdermal every 24 hours  melatonin 12 milliGRAM(s) Oral <User Schedule>  nystatin Powder 1 Application(s) Topical every 8 hours  pantoprazole  Injectable 40 milliGRAM(s) IV Push every 12 hours  predniSONE  Solution 5 milliGRAM(s) Enteral Tube <User Schedule>  QUEtiapine 12.5 milliGRAM(s) Oral <User Schedule>  sodium chloride 1 Gram(s) Oral every 8 hours  sodium chloride 0.65% Nasal 1 Spray(s) Both Nostrils every 6 hours  vancomycin    Solution 125 milliGRAM(s) Oral every 6 hours  witch hazel Pads 1 Application(s) Topical every 12 hours    MEDICATIONS  (PRN):  acetaminophen   Oral Liquid .. 650 milliGRAM(s) Oral every 6 hours PRN Temp greater or equal to 38C (100.4F), Mild Pain (1 - 3)  aluminum hydroxide/magnesium hydroxide/simethicone Suspension 30 milliLiter(s) Enteral Tube every 4 hours PRN Dyspepsia  oxyCODONE    Solution 10 milliGRAM(s) Oral every 6 hours PRN Severe Pain (7 - 10)  oxyCODONE    Solution 2.5 milliGRAM(s) Oral every 6 hours PRN Mild Pain (1 - 3)  oxyCODONE    Solution 5 milliGRAM(s) Oral every 6 hours PRN Moderate Pain (4 - 6)      LABS:                        8.5    15.65 )-----------( 202      ( 10 Nov 2024 07:02 )             29.8     11-10    133[L]  |  102  |  23  ----------------------------<  109[H]  3.9   |  19[L]  |  <0.30[L]    Ca    8.6      10 Nov 2024 07:02  Phos  2.3     11-10  Mg     2.0     11-10    TPro  6.4  /  Alb  2.1[L]  /  TBili  0.6  /  DBili  x   /  AST  20  /  ALT  15  /  AlkPhos  164[H]  11-10      Urinalysis Basic - ( 10 Nov 2024 23:21 )    Color: Yellow / Appearance: Clear / S.019 / pH: x  Gluc: x / Ketone: Negative mg/dL  / Bili: Negative / Urobili: 0.2 mg/dL   Blood: x / Protein: 30 mg/dL / Nitrite: Negative   Leuk Esterase: Negative / RBC: 7 /HPF / WBC 5 /HPF   Sq Epi: x / Non Sq Epi: 9 /HPF / Bacteria: Negative /HPF          CAPILLARY BLOOD GLUCOSE    MICROBIOLOGY:     RADIOLOGY:  [ ] Reviewed and interpreted by me    Mode: AC/ CMV (Assist Control/ Continuous Mandatory Ventilation)  RR (machine): 18  TV (machine): 350  FiO2: 30  PEEP: 5  ITime: 1  MAP: 11  PIP: 31

## 2024-11-12 NOTE — PROGRESS NOTE ADULT - ASSESSMENT
75 yo F PMH T2DM on insulin, HTN, HLD, hypothyroidism, RA on Prednisone, Fibromyalgia, cerebral aneurysm s/p repair, chronic hypercapnic respiratory failure s/p trach (vent dependent) and chronic PEG 2022, recurrent C. diff infection (follows with Dr. 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  Pt also noted to have rectal bleeding this past week requiring transfusion 5 days ago in ED as per daughter. Daughter also reports brownish discharge from G-J tube. In ED, pt afebrile, fiO2 96-98% on 40% on ventilator.  Chest X-ray w/ opacification of right lung concerning for possible PNA.  Admitted to RCU for further management. Sputum cxs grew PSA; treated with course of Cefepime. Patient with decreased H+H received 1 unit of PRBCS on 10/10.  10/14 EGD w/ Dr. Rosales for PEGJ exchange and clippings placed for closure of fistula site.  Persistent fevers treated with meropenem and vanc (d/c'd 10/20).  CT A/P without infectious source.  Continued fevers and persistent leukocytosis 11/1 CDiff positive - po vanco started, IV flagyl added (11/3-11/8). Head CT performed on 11/6 due to concern of lethargy no acute intracranial findings were noted; likely related to metabolic encephalopathy. Patient continued to have persistent fevers and was empirically treated with Cefepime (11/5-11/9) for CR PSA in sputum cx. CT C/A/P performed 11/6 c/w consolidative opacity LLL. Continue airway management with duonebs, chest PT and suction PRN.  75 yo F PMH T2DM on insulin, HTN, HLD, hypothyroidism, RA on Prednisone, Fibromyalgia, cerebral aneurysm s/p repair, chronic hypercapnic respiratory failure s/p trach (vent dependent) and chronic PEG 2022, recurrent C. diff infection (follows with Dr. 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  Pt also noted to have rectal bleeding this past week requiring transfusion 5 days ago in ED as per daughter. Daughter also reports brownish discharge from G-J tube. In ED, pt afebrile, fiO2 96-98% on 40% on ventilator.  Chest X-ray w/ opacification of right lung concerning for possible PNA.  Admitted to RCU for further management. Sputum cxs grew PSA; treated with course of Cefepime. Patient with decreased H+H received 1 unit of PRBCS on 10/10.  10/14 EGD w/ Dr. Rosales for PEGJ exchange and clippings placed for closure of fistula site.  Persistent fevers treated with meropenem and vanc (d/c'd 10/20).  CT A/P without infectious source.  Continued fevers and persistent leukocytosis 11/1 CDiff positive - po vanco started, IV flagyl added (11/3-11/8). Head CT performed on 11/6 due to concern of lethargy no acute intracranial findings were noted; likely related to metabolic encephalopathy. Patient continued to have persistent fevers and was empirically treated with Cefepime (11/5-11/9) for CR PSA in sputum cx. CT C/A/P performed 11/6 c/w consolidative opacity LLL. Evening of 11/10-11/11, WBC 15 --> 21, procal 1 --> 43, lactate 3.2 -- pt also febrile. Infectious w/u sent 11/10 -- BCx negative, trach aspirate cx pending, UA negative. CT CAP on 11/11 showed left greater than right basilar consolidation and patchy groundglass opacity in both lungs consistent with atelectasis and/or pneumonia. Feeding tube coils in the stomach with tip within the first portion of the duodenum.. No bowel obstruction. Asked Dr. Rosales to adjust J tube. Per ID -- restart cefepime 11/11 - ). 73 yo F PMH T2DM on insulin, HTN, HLD, hypothyroidism, RA on Prednisone, Fibromyalgia, cerebral aneurysm s/p repair, chronic hypercapnic respiratory failure s/p trach (vent dependent) and chronic PEG 2022, recurrent C. diff infection (follows with Dr. Newman), persistent GJ tube leaks now s/p GC fistula repair 8/12 BIBEMS on 10/7 with daughter for respiratory distress, hypoxia to 88%.  Pt also noted to have rectal bleeding week prior to presentation requiring transfusion 5 days ago in ED as per daughter. Daughter also reports brownish discharge from G-J tube. In ED, pt afebrile, fiO2 96-98% on 40% on ventilator.  Chest X-ray w/ opacification of right lung concerning for possible PNA.  Admitted to RCU for further management. Sputum cxs grew PSA; treated with course of Cefepime. Patient with decreased H+H received 1 unit of PRBCS on 10/10.  10/14 EGD w/ Dr. Rosales for PEGJ exchange and clippings placed for closure of fistula site.  Persistent fevers treated with meropenem and vanc (d/c'd 10/20).  CT A/P without infectious source.  Continued fevers and persistent leukocytosis 11/1 CDiff positive - po vanco started, IV flagyl added (11/3-11/8). Head CT performed on 11/6 due to concern of lethargy no acute intracranial findings were noted; likely related to metabolic encephalopathy. Patient continued to have persistent fevers and was empirically treated with Cefepime (11/5-11/9) for CR PSA in sputum cx. CT C/A/P performed 11/6 c/w consolidative opacity LLL. Evening of 11/10-11/11, WBC 15 --> 21, procal 1 --> 43, lactate 3.2 -- pt also febrile. Infectious w/u sent 11/10 -- BCx negative, trach aspirate cx pending, UA negative. CT CAP on 11/11 showed left greater than right basilar consolidation and patchy groundglass opacity in both lungs consistent with atelectasis and/or pneumonia. Feeding tube coils in the stomach with tip within the first portion of the duodenum.. No bowel obstruction. Asked Dr. Rosales to adjust J tube. Per ID -- restart cefepime 11/11 - ).

## 2024-11-12 NOTE — PROGRESS NOTE ADULT - SUBJECTIVE AND OBJECTIVE BOX
Chief Complaint: Diabetes Mellitus follow up    INTERVAL HX:  Patient seen at bedside with aide and  at bedside. Tube feedings are on hold for now due to suspicion on aspiration.  BG over the last 24 hrs have been within goal range 100-180mg/dl. No hypoglycemia. Continues on d5 iv fluid.     Review of Systems:  unable    Allergies    walnut (Unknown)  metronidazole (Rash)  Lyrica (Unknown)  penicillin (Unknown)  Pineapple (Unknown)  Tagamet (Unknown)  heparin (Unknown)  Pecans (Unknown)  Hazelnut (Unknown)  meropenem (Rash)    Intolerances      MEDICATIONS  (STANDING):  albuterol/ipratropium for Nebulization 3 milliLiter(s) Nebulizer every 6 hours  AQUAPHOR (petrolatum Ointment) 1 Application(s) Topical once  artificial  tears Solution 1 Drop(s) Both EYES every 6 hours  ascorbic acid 500 milliGRAM(s) Oral daily  Biotene Dry Mouth Oral Rinse 5 milliLiter(s) Swish and Spit every 6 hours  calcium carbonate 1250 mG  + Vitamin D (OsCal 500 + D) 1 Tablet(s) Oral <User Schedule>  cefepime   IVPB 2000 milliGRAM(s) IV Intermittent every 8 hours  chlorhexidine 0.12% Liquid 15 milliLiter(s) Oral Mucosa every 12 hours  chlorhexidine 4% Liquid 1 Application(s) Topical daily  dextrose 5%. 1000 milliLiter(s) (100 mL/Hr) IV Continuous <Continuous>  dextrose 5%. 1000 milliLiter(s) (50 mL/Hr) IV Continuous <Continuous>  dextrose 5%. 1000 milliLiter(s) (50 mL/Hr) IV Continuous <Continuous>  dextrose 5%. 1000 milliLiter(s) (100 mL/Hr) IV Continuous <Continuous>  dextrose 5%. 1000 milliLiter(s) (50 mL/Hr) IV Continuous <Continuous>  dextrose 50% Injectable 25 Gram(s) IV Push once  dextrose 50% Injectable 25 Gram(s) IV Push once  dextrose 50% Injectable 12.5 Gram(s) IV Push once  diclofenac sodium 1% Gel 2 Gram(s) Topical every 6 hours  erythromycin   IVPB 160 milliGRAM(s) IV Intermittent every 8 hours  escitalopram 10 milliGRAM(s) Oral <User Schedule>  gabapentin Solution 250 milliGRAM(s) Oral <User Schedule>  glucagon  Injectable 1 milliGRAM(s) IntraMuscular once  glucagon  Injectable 1 milliGRAM(s) IntraMuscular once  hemorrhoidal Ointment 1 Application(s) Rectal at bedtime  influenza  Vaccine (HIGH DOSE) 0.5 milliLiter(s) IntraMuscular once  insulin glargine Injectable (LANTUS) 5 Unit(s) SubCutaneous <User Schedule>  insulin lispro (ADMELOG) corrective regimen sliding scale   SubCutaneous every 6 hours  lactobacillus acidophilus 1 Tablet(s) Oral <User Schedule>  levothyroxine 125 MICROGram(s) Oral <User Schedule>  lidocaine   4% Patch 4 Patch Transdermal every 24 hours  melatonin 12 milliGRAM(s) Oral <User Schedule>  nystatin Powder 1 Application(s) Topical every 8 hours  pantoprazole  Injectable 40 milliGRAM(s) IV Push every 12 hours  predniSONE  Solution 5 milliGRAM(s) Enteral Tube <User Schedule>  QUEtiapine 12.5 milliGRAM(s) Oral <User Schedule>  sodium chloride 1 Gram(s) Oral every 8 hours  sodium chloride 0.65% Nasal 1 Spray(s) Both Nostrils every 6 hours  vancomycin    Solution 125 milliGRAM(s) Oral every 6 hours  witch hazel Pads 1 Application(s) Topical every 12 hours        insulin glargine Injectable (LANTUS)   10 Unit(s) SubCutaneous (11-12-24 @ 06:38)    predniSONE  Solution   5 milliGRAM(s) Enteral Tube (11-12-24 @ 12:06)        PHYSICAL EXAM:  VITALS: T(C): 37.4 (11-12-24 @ 10:49)  T(F): 99.3 (11-12-24 @ 10:49), Max: 99.3 (11-12-24 @ 10:49)  HR: 95 (11-12-24 @ 15:20) (89 - 120)  BP: 105/63 (11-12-24 @ 12:28) (98/48 - 161/84)  RR:  (18 - 18)  SpO2:  (99% - 100%)  Wt(kg): --  GENERAL: NAD  Respiratory: Respirations unlabored   Extremities: Warm, no edema  NEURO: Alert , appropriate     LABS:  POCT Blood Glucose.: 173 mg/dL (11-12-24 @ 12:09)  POCT Blood Glucose.: 128 mg/dL (11-12-24 @ 05:58)  POCT Blood Glucose.: 106 mg/dL (11-11-24 @ 23:45)  POCT Blood Glucose.: 110 mg/dL (11-11-24 @ 17:33)  POCT Blood Glucose.: 184 mg/dL (11-11-24 @ 13:05)  POCT Blood Glucose.: 247 mg/dL (11-11-24 @ 11:12)  POCT Blood Glucose.: 153 mg/dL (11-11-24 @ 06:48)  POCT Blood Glucose.: 248 mg/dL (11-10-24 @ 23:59)  POCT Blood Glucose.: 273 mg/dL (11-10-24 @ 19:38)  POCT Blood Glucose.: 281 mg/dL (11-10-24 @ 17:41)  POCT Blood Glucose.: 190 mg/dL (11-10-24 @ 12:02)  POCT Blood Glucose.: 105 mg/dL (11-10-24 @ 05:23)  POCT Blood Glucose.: 173 mg/dL (11-10-24 @ 00:23)  POCT Blood Glucose.: 301 mg/dL (11-09-24 @ 17:03)                          7.0    22.66 )-----------( 183      ( 12 Nov 2024 06:41 )             24.6     11-12    135  |  107  |  24[H]  ----------------------------<  123[H]  4.6   |  15[L]  |  <0.30[L]    Ca    8.3[L]      12 Nov 2024 06:41  Phos  2.8     11-12  Mg     2.2     11-12    TPro  5.6[L]  /  Alb  1.8[L]  /  TBili  0.7  /  DBili  x   /  AST  23  /  ALT  10  /  AlkPhos  119  11-12    eGFR: 113 mL/min/1.73m2 (12 Nov 2024 06:41)      Thyroid Function Tests:  10-16 @ 06:50 TSH 1.74 FreeT4 1.2 T3 -- Anti TPO -- Anti Thyroglobulin Ab -- TSI --      A1C with Estimated Average Glucose Result: 5.8 % (10-14-24 @ 05:08)    Estimated Average Glucose: 120 mg/dL (10-14-24 @ 05:08)

## 2024-11-12 NOTE — PROGRESS NOTE ADULT - ASSESSMENT
75 yo F w/h/o controlled T2DM (A1C 5.8%) on insulin at home. Also HTN, HLD, hypothyroidism, RA on Prednisone, Fibromyalgia, cerebral aneurysm s/p repair, chronic hypercapnic respiratory failure s/p trach (vent dependent) and chronic PEG 2022, recurrent C. diff infection (follows with Dr. 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 S/p antibiotics and s/p GJ tube exchanges on 10/14, s/p PEG replacement 10/21.     Endocrine consulted for Type 2 DM management while on tube feeding.  tube feeding ([employer] Peptide 1.5 ([id number]) at 80 ml/hr)  for 12 hours (6A-6P), have been put on hold due to concerns for aspiration per primary team. Positive for diarrhea , rectal tube in place. +C Diff. Patient placed on D5 ivfluid. Team will advise when TF are restarted.  Endocrine will closely monitor BG and adjust insulin as needed for BG goal 100-180mg/dL inpatient.          Home DM medications: Lantus 35 units at HS, Humalog 26 units with # 1 feeding, 22 units with #2 tube feeding, 20 units with #3 tube feeding and  Humalog correction scale (  2 units for -977, 4 units for -250, 6 units for -300, 8units for -350, 10 units for -400, 12 units for -450)   while on [employer] formula 3 cans/day, slow bolus( run at 80 ml/hr total volume 960 ml/day> 1st can around 6:30-8:30, 2nd can around 3 pm, 3rd can around 8-9 pm) plus Banatrol 1/2 packet BID, was increasing to 1 packet BID, plus Kefir 10 oz/day ( divided in multiple times throughout the day).

## 2024-11-12 NOTE — PROGRESS NOTE ADULT - PROBLEM SELECTOR PLAN 9
- Patient has known hx of prior G-Tube stoma leak   - S/p EGD for closure of Gastric Fistula (8/12) w/ Total of 3 Mantis clips and two 22 mm Microtech clips by GI Dr Rosales   - Old stoma peg site with mild clear drainage s/p repair of fistula  - 10/14 G-J exchanged by GI and clips applied to fistula site  - 10/17 PEGJ clogged  - 10/21 PEGJ revision done, tolerating enteral feeds  - Both feeds and medications are now give through feeding/J-port  - G-port is placed to continuous bag drainage for decompression  - Continue Maalox TID for gaseous distention  - Tube study requested to evaluate for J-tube migration 11/9 -- showed tip in duodenum. Increased output from J tube, possible aspiration -- asked GI to come reassess; ECG for QT, started erythromycin (reglan contraindicated); dc TF, dc Admelog (c/w Lantus), started d5 -- GI requested CT CAP 11/11 - Patient has known hx of prior G-Tube stoma leak   - S/p EGD for closure of Gastric Fistula (8/12) w/ Total of 3 Mantis clips and two 22 mm Microtech clips by GI Dr Rosales   - Old stoma peg site with mild clear drainage s/p repair of fistula  - 10/14 G-J exchanged by GI and clips applied to fistula site  - 10/17 PEGJ clogged  - 10/21 PEGJ revision done, tolerating enteral feeds  - Both feeds and medications are now give through feeding/J-port  - G-port is placed to continuous bag drainage for decompression  - Continue Maalox TID for gaseous distention  - Tube study requested to evaluate for J-tube migration 11/9 -- showed tip in duodenum. Increased output from J tube, possible aspiration -- asked GI to come reassess; ECG for QT, started erythromycin (reglan contraindicated); dc TF, dc Admelog (c/w Lantus), started d5   - CT CAP on 11/11 shows feeding tube coils in the stomach with tip within the first portion of the duodenum.. No bowel obstruction.   - Rectal tube displaced when pt turned -- new rectal tube placed 11/12  - Asked Dr. Rosales to adjust J tube 11/12

## 2024-11-12 NOTE — PROGRESS NOTE ADULT - PROBLEM SELECTOR PLAN 2
[] PNA / Intermittent fevers  - Sputum cx 10/8: PSA  - CXR 10/7: Opacification of the right middle lobe may represent pneumonia versus atelectasis  - CT A/P 10/18: ? Multifocal pneumonia  - Completed Initial course of cefepime on 10/12  - Sputum 11/4: PSA  - Repeat CT C/A/P 11/6: Consolidative opacity LLL  - Cefepime restarted 11/5-->11/10; in setting of recurrent fevers  - Febrile morning 11/10 and overnight 11/11 -- ofirmev given, pan cultures sent on 11/10 -- procal 1.11 --> 43; WBC 15.65 --> 21.43; increased output from J tube - possible asp? -- CXR ordered; ECG for QT, started erythromycin (reglan contraindicated); dc TF, dc Admelog (c/w Lantus), started d5 -- GI requested CT CAP 11/11    [] C.Diff   - Stool C.diff: + 11/1   - S/p Flagyl 11/3-11/8  - Continue enteral Vanco  - + RT remains in place; monitor output [] PNA / Intermittent fevers  - Sputum cx 10/8: PSA  - CXR 10/7: Opacification of the right middle lobe may represent pneumonia versus atelectasis  - CT A/P 10/18: ? Multifocal pneumonia  - Completed Initial course of cefepime on 10/12  - Sputum 11/4: PSA  - Repeat CT C/A/P 11/6: Consolidative opacity LLL  - Cefepime restarted 11/5-->11/10; in setting of recurrent fevers  - Febrile morning 11/10 and overnight 11/11 -- ofirmev given, pan cultures sent on 11/10 -- procal 1.11 --> 43; WBC 15.65 --> 21.43; increased output from J tube - possible aspiration  - Infectious w/u sent 11/10 -- BCx negative, trach aspirate cx pending, UA negative  - CT CAP on 11/11 showed left greater than right basilar consolidation and patchy groundglass opacity in both lungs consistent with atelectasis and/or pneumonia.   - per ID -- restart cefepime  - resent MRSA/MSSA PCR 11/12 (prior from 10/10)    [] C.Diff   - Stool C.diff: + 11/1   - S/p Flagyl 11/3-11/8  - Continue enteral Vanco  - + RT remains in place; monitor output [] PNA / Intermittent fevers  - Sputum cx 10/8: PSA  - CXR 10/7: Opacification of the right middle lobe may represent pneumonia versus atelectasis  - CT A/P 10/18: ? Multifocal pneumonia  - Completed Initial course of cefepime on 10/12  - Sputum 11/4: PSA  - Repeat CT C/A/P 11/6: Consolidative opacity LLL  - Cefepime restarted 11/5-->11/10; in setting of recurrent fevers  - Febrile morning 11/10 and overnight 11/11 -- ofirmev given, pan cultures sent on 11/10 -- procal 1.11 --> 43; WBC 15.65 --> 21.43; increased output from J tube - possible aspiration  - Infectious w/u sent 11/10 -- BCx negative, trach aspirate cx pending, UA negative  - CT CAP on 11/11 showed left greater than right basilar consolidation and patchy groundglass opacity in both lungs consistent with atelectasis and/or pneumonia.   - per ID -- restart cefepime  - re-sent MRSA/MSSA PCR 11/12 (prior from 10/10)    [] C.Diff   - Stool C.diff: + 11/1   - S/p Flagyl 11/3-11/8  - Continue enteral Vanco  - + RT remains in place; monitor output

## 2024-11-12 NOTE — PROGRESS NOTE ADULT - PROBLEM SELECTOR PLAN 5
- Patient on Lantus and Humalog at home    - Continue Lantus/ISS/ ADMELOG   - Endocrine following - Patient on Lantus and Humalog at home    - dc TF due to J tube issue and possible aspiration  - dc Admelog (11/11)  - Lantus decreased 10 --> 5 by endo (11/12)  - started D5 @ 50 (11/12)  - c/w ISS  - Endocrine following

## 2024-11-12 NOTE — CHART NOTE - NSCHARTNOTEFT_GEN_A_CORE
NUTRITION FOLLOW UP NOTE    PATIENT SEEN FOR: follow up     SOURCE: [] Patient  [x] Current Medical Record  [x] Nursing  [] Family/support person at bedside  [x] Patient unavailable/inappropriate  [] Other:     CHART REVIEWED/EVENTS NOTED.  [] No changes to nutrition care plan to note  [x] Nutrition Status:  -PMH T2DM  -s/p trach (vent dependent) and chronic PEG   -s/p G-J tube exchange by GI on 10/21. J tube for meds and feeds per team    No Diet Order    CURRENT DIET ORDER IS:  [] Appropriate:  [] Inadequate:  [x] Other: see below for recommendations   -->Increased output from J tube, possible aspiration per team. Tube feeding discontinued .     NUTRITION INTAKE/PROVISION:  [] PO:  [x] Enteral Nutrition: Previously ordered for: Apptive Peptide 1.5 80ml x 12 hours + Alfred BID + No carb Prosource daily + 1/2 packet Banatrol daily providing at 100% provision: 1768kcal (32.5kcal/kg), 92g protein (1.7g/kg) and 672ml free water.   -->10/24/24: Daughter continuing to request to continue 80ml/hr of tube feeding be provided x 12 hours.  [] Parenteral Nutrition:    ANTHROPOMETRICS:  Drug Dosing Weight  Height (cm): 149.9 (21 Oct 2024 18:49)  Weight (kg): 54.4 (21 Oct 2024 18:49)  BMI (kg/m2): 24.2 (21 Oct 2024 18:49)  BSA (m2): 1.48 (21 Oct 2024 18:49)  10/16 62.5kg   Daily Weight in k.6 ()  RD will continue to monitor trends.     MEDICATIONS  (STANDING):  ascorbic acid  calcium carbonate 1250 mG  + Vitamin D (OsCal 500 + D)  cefepime   IVPB  dextrose 5%.  dextrose 5%.  dextrose 5%.  dextrose 5%.  dextrose 5%.  dextrose 50% Injectable  dextrose 50% Injectable  dextrose 50% Injectable  erythromycin   IVPB  glucagon  Injectable  glucagon  Injectable  insulin glargine Injectable (LANTUS)  insulin lispro (ADMELOG) corrective regimen sliding scale  levothyroxine  pantoprazole  Injectable  predniSONE  Solution  sodium chloride  vancomycin    Solution    Pertinent Labs:  @ 06:41: Na 135, BUN 24[H], Cr <0.30[L], [H], K+ 4.6, Phos 2.8, Mg 2.2, Alk Phos 119, ALT/SGPT 10, AST/SGOT 23, HbA1c --    A1C with Estimated Average Glucose Result: 5.8 % (10-14-24 @ 05:08)  A1C with Estimated Average Glucose Result: 6.4 % (24 @ 07:32)    Finger Sticks:  POCT Blood Glucose.: 173 mg/dL ( @ 12:09)  POCT Blood Glucose.: 128 mg/dL ( @ 05:58)  POCT Blood Glucose.: 106 mg/dL ( @ 23:45)  POCT Blood Glucose.: 110 mg/dL ( @ 17:33)  POCT Blood Glucose.: 184 mg/dL ( @ 13:05)    NUTRITIONALLY PERTINENT MEDICATIONS/LABS:  [x] Reviewed  [x] Relevant notes on medications/labs:  -iron low (10/30/24)     EDEMA:  [x] Reviewed  [] Relevant notes:    GI/ I&O:  [x] Reviewed  [] Relevant notes:  [x] Other: c.diff positive. rectal tube back in place     SKIN:   sacrum stage IV per flow sheets    ESTIMATED NEEDS:  Based on: dosing weight 54.4kg   30-35kcal/kg 1632-1904kcal/day  1.4-1.8g/kg 76-98g protein/day  defer fluid needs to team     NUTRITION DIAGNOSIS:  [x] Prior Dx: increased nutrient needs  [] New Dx:    EDUCATION:  [] Yes:  [x] Not appropriate/warranted  10/15/24 Nutrition Chart Note: Spoke with pt daughter Marysol Spann over phone ([phone]) to confirm pt home tube feeding regimen. Per daughter, pt receiving [employer] Peptide 1.5, 3 bottles/day. 80ml/hr x 12 hours.     NUTRITION CARE PLAN:  1. Diet: WHEN ABLE: continue [employer] Peptide 1.5 for a total volume of 960ml. Rate/hr deferred to team. Can continue banatrol to aid in stool bulking, prosource to aid in added protein/calories for wound healing and Alfred to aid in wound healing.   -->Insulin regimen deferred to team/endocrinology   -->Defer free water flush to team   -->If unable to advance EN, consider PN to aid in meeting nutrient needs  2. Continue vitamin/mineral regimen as ordered per team   3. Consider iron supplement if no contraindications      [] Achieved - Continue current nutrition intervention(s)  [] Current medical condition precludes nutrition intervention at this time.    MONITORING AND EVALUATION:   RD remains available upon request and will follow up per protocol.    Rufina Morris MS, RD, CDN / Teams

## 2024-11-12 NOTE — PROGRESS NOTE ADULT - PROBLEM SELECTOR PLAN 1
Inpatient Plan:  - Please monitor blood glucose values q 6 hours while on tube feeding or NPO  - Decrease Lantus to 5 units @0600  - Hold Admelog to 24u @0600 and 42u @1200  while TF on hold   -Agree with D5 infusion while TF are held.   - Start adjust low dose Admelog correctional scale q6h while on TFs and overnight    Discharge Plan:  - TBD depending on insulin requirement and discharge tube feeding regimen (Bolus vs continuous tube feeding)   - If bolus tube feeding > Lantus plus Humalog   - Patient should have BGs checked 4x a day while on TFs.    Daughter aware to contact Endocrinologist if BG <70mg/dL x1 or >200mg/dL consistently or >400mg/dL x1.   - Followup with Dr Ruth: Endocrinology [facility]: [address] [place] [address]. [address]. [place] [address]. Tel: [phone] Telemedicine- daughter to schedule.   - Make sure pt has Rx for all DM supplies and insulin

## 2024-11-12 NOTE — PROGRESS NOTE ADULT - SUBJECTIVE AND OBJECTIVE BOX
Follow Up:  fevers    Interval History/ROS:  somnolent    Allergies  walnut (Unknown)  metronidazole (Rash)  Lyrica (Unknown)  penicillin (Unknown)  Pineapple (Unknown)  Tagamet (Unknown)  heparin (Unknown)  Pecans (Unknown)  Hazelnut (Unknown)  meropenem (Rash)        ANTIMICROBIALS:  cefepime   IVPB 2000 every 8 hours  erythromycin   IVPB 160 every 8 hours  vancomycin    Solution 125 every 6 hours      OTHER MEDS:  MEDICATIONS  (STANDING):  acetaminophen   Oral Liquid .. 650 every 6 hours PRN  albuterol/ipratropium for Nebulization 3 every 6 hours  aluminum hydroxide/magnesium hydroxide/simethicone Suspension 30 every 4 hours PRN  dextrose 50% Injectable 25 once  dextrose 50% Injectable 25 once  dextrose 50% Injectable 12.5 once  escitalopram 10 <User Schedule>  gabapentin Solution 250 <User Schedule>  glucagon  Injectable 1 once  glucagon  Injectable 1 once  influenza  Vaccine (HIGH DOSE) 0.5 once  insulin glargine Injectable (LANTUS) 5 <User Schedule>  insulin lispro (ADMELOG) corrective regimen sliding scale  every 6 hours  levothyroxine 125 <User Schedule>  melatonin 12 <User Schedule>  oxyCODONE    Solution 5 every 6 hours PRN  oxyCODONE    Solution 10 every 6 hours PRN  oxyCODONE    Solution 2.5 every 6 hours PRN  pantoprazole  Injectable 40 every 12 hours  predniSONE  Solution 5 <User Schedule>  QUEtiapine 12.5 <User Schedule>      Vital Signs Last 24 Hrs  T(C): 36.9 (12 Nov 2024 17:12), Max: 37.4 (12 Nov 2024 10:49)  T(F): 98.4 (12 Nov 2024 17:12), Max: 99.3 (12 Nov 2024 10:49)  HR: 106 (12 Nov 2024 18:00) (89 - 120)  BP: 126/53 (12 Nov 2024 17:12) (98/48 - 126/53)  BP(mean): --  RR: 20 (12 Nov 2024 17:12) (18 - 20)  SpO2: 100% (12 Nov 2024 18:00) (99% - 100%)    Parameters below as of 12 Nov 2024 18:00  Patient On (Oxygen Delivery Method): ventilator        PHYSICAL EXAM:  General:  NAD, Non-toxic  Neurology: somnolent  HENT flushed face  Respiratory: Clear to auscultation bilaterally  CV: RRR, S1S2, no murmurs, rubs or gallops  Abdominal: Soft, Non-tender, non-distended, normal bowel sounds  Extremities: generalized edema  Line Sites: Clear  Skin: No rash                          7.0    22.66 )-----------( 183      ( 12 Nov 2024 06:41 )             24.6   WBC Count: 22.66 (11-12 @ 06:41)  WBC Count: 21.43 (11-11 @ 07:13)  WBC Count: 15.65 (11-10 @ 07:02)  WBC Count: 12.79 (11-09 @ 06:39)  WBC Count: 10.73 (11-08 @ 06:51)    11-12    135  |  107  |  24[H]  ----------------------------<  123[H]  4.6   |  15[L]  |  <0.30[L]    Ca    8.3[L]      12 Nov 2024 06:41  Phos  2.8     11-12  Mg     2.2     11-12    TPro  5.6[L]  /  Alb  1.8[L]  /  TBili  0.7  /  DBili  x   /  AST  23  /  ALT  10  /  AlkPhos  119  11-12    11.10.24 @ 23:21)   pH Urine: 6.0  Urine Appearance: Clear  Color: Yellow  Specific Gravity: 1.019  Protein, Urine: 30 mg/dL  Glucose Qualitative, Urine: Negative mg/dL  Ketone - Urine: Negative mg/dL  Blood, Urine: Negative  Bilirubin: Negative  Urobilinogen: 0.2 mg/dL  Leukocyte Esterase Concentration: Negative  Nitrite: Negative  Review: Reviewed  White Blood Cell - Urine: 5 /HPF  Red Blood Cell - Urine: 7 /HPF  Bacteria: Negative /HPF  Cast: 4 /LPF  Epithelial Cells: 9 /HPF    MICROBIOLOGY:  Trach Asp Tracheal Aspirate  11-10-24 --  --    Numerous polymorphonuclear leukocytes per low power field  Rare Squamous epithelial cells per low power field  Rare Gram positive cocci in pairs per oil power field  Few Gram Negative Rods per oil power field  Few Gram Positive Rods per oil power field      .Blood BLOOD  11-10-24   No growth at 48 Hours  --  --      .Sputum Sputum  11-04-24   Moderate Mixed gram negative rods including  Moderate Pseudomonas aeruginosa  Commensal vinay consistent with body site  --  Pseudomonas aeruginosa      Clean Catch Clean Catch (Midstream)  11-03-24   No growth  --  --      .Blood BLOOD  11-03-24   No growth at 5 days  --  --      .Blood BLOOD  11-03-24   No growth at 5 days  --  --      .Blood BLOOD  11-01-24   Growth in anaerobic bottle: Staphylococcus epidermidis  Isolation of Coagulase negative Staphylococcus from single blood culture sets may represent contamination.CR      Catheterized Catheterized  11-01-24   No growth  --  --      .Blood BLOOD  11-01-24   No growth at 5 days  --  --      Trach Asp Tracheal Aspirate  10-27-24   Moderate Pseudomonas aeruginosa  Moderate Pseudomonas aeruginosa #2  Multiple Morphological Strains  Commensal vinay consistent with body site  --  Pseudomonas aeruginosa  Pseudomonas aeruginosa      .Blood BLOOD  10-27-24   No growth at 5 days  --  --      .Blood BLOOD  10-17-24   No growth at 5 days  --  --      .Blood BLOOD  10-17-24   No growth at 5 days  --  --      .Blood BLOOD  10-15-24   No growth at 5 days  --  --      Trach Asp Tracheal Aspirate  10-15-24   Mixed gram negative rods including  Numerous Pseudomonas aeruginosa  Commensal vinay consistent with body site  --  Pseudomonas aeruginosa    10-15-24   No growth at 5 days  --  --        Trach Asp Tracheal Aspirate  .Blood BLOOD        RADIOLOGY:  < from: CT Abdomen and Pelvis w/ IV Cont (11.11.24 @ 21:23) >  FINDINGS:  CHEST:  LUNGS AND LARGE AIRWAYS: Patent central airways left greater than right   basilar consolidation and patchy groundglass opacity in both lungs   consistent with atelectasis and/or pneumonia. Tracheostomy cannula in   appropriate position.  PLEURA: No pleural effusion.  VESSELS: Atherosclerotic calcifications.  HEART: Heart size is normal. No pericardial effusion.  MEDIASTINUM AND ROSEMARY: Increased number of nonenlarged and borderline   enlarged mediastinal lymph nodes largest precarinal measuring 1.3 x 1.0   cm, likely reactive.  CHEST WALL AND LOWER NECK: Within normal limits.    ABDOMEN AND PELVIS:  LIVER: Within normal limits.  BILE DUCTS: Normal caliber.  GALLBLADDER: Cholelithiasis.  SPLEEN: Within normal limits.  PANCREAS: Within normal limits.  ADRENALS: Within normal limits.  KIDNEYS/URETERS: Within normal limits.    BLADDER: Within normal limits.  REPRODUCTIVE ORGANS: Uterus and bilateral adnexa within normal limits.    BOWEL: Gastrostomy tube in the stomach. Feeding tube coils in the stomach   with tip within the first portion of the duodenum.. No bowel obstruction.   Appendix is not visualized. Small amount of liquid stool in nondilated   colon. Rectal catheter balloon is transversely oriented and low-lying.  PERITONEUM/RETROPERITONEUM: Within normal limits.  VESSELS: Atheromatous changes.  LYMPH NODES: Increased number of small retroperitoneal lymph nodes,   likely reactive.  ABDOMINAL WALL: Diffuse muscular atrophy. Postoperative changes. Old   gastrostomy tube tract, now without evidence of gas along the tract.   Small fat-containing umbilical hernia. Subcutaneous edema.  BONES: Osteopenia. Left superior and inferior pubic ramus fractures. Old   left-sided rib fractures. Mild loss of vertebral body height in L3 and   L5. Superior endplate sclerotic change and mild compression deformity in   T11. Soft tissue infiltration overlying the distal sacrum and coccyx with   mild bony erosive changes, similar to prior. No drainable fluid   collection.    IMPRESSION:  Bibasilar consolidation and groundglass opacity, consistent with   atelectasis and/or pneumonia.    Liquid stool in nondilated colon. Transversely oriented and low lying   rectal catheter balloon. Repositioning should be considered.    Soft tissue infiltration overlying the distal sacrum and coccyx with mild   bony erosive changes suggesting osteomyelitis, similar to prior. No   drainable fluid collection.    < end of copied text >    Zion Newman MD; Division of Infectious Disease; Pager: [phone]; nights and weekends: [phone]

## 2024-11-12 NOTE — PROGRESS NOTE ADULT - PROBLEM SELECTOR PLAN 12
- Patients daughter updated at bedside by RCU Team today  - Daughter states she will reach out to [facility] to check availability

## 2024-11-12 NOTE — PROGRESS NOTE ADULT - PROBLEM SELECTOR PLAN 3
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  No focal intra-abdominal/pelvic or subcutaneous collections.  - Occult 10/7: Positive  - Transfused on 10/10 and 10/30 with appropriate response in H+H  - heme consult appreciated 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  No focal intra-abdominal/pelvic or subcutaneous collections.  - Occult 10/7: Positive  - Transfused on 10/10 and 10/30 with appropriate response in H+H  - heme consult appreciated  - Hg 7.0 on 11/12 -- 1u pRBC given

## 2024-11-12 NOTE — PROGRESS NOTE ADULT - SUBJECTIVE AND OBJECTIVE BOX
Patient is a 74y old  Female who presents with a chief complaint of Patient admitted with Hypoxemia and episode of Bright red blood per rectum (11 Nov 2024 18:48)      Interval Events:    REVIEW OF SYSTEMS:  [ ] Positive  [ ] All other systems negative  [ ] Unable to assess ROS because ________    Vital Signs Last 24 Hrs  T(C): 36.7 (11-12-24 @ 04:46), Max: 37.2 (11-12-24 @ 00:01)  T(F): 98.1 (11-12-24 @ 04:46), Max: 98.9 (11-12-24 @ 00:01)  HR: 108 (11-12-24 @ 05:07) (89 - 120)  BP: 123/47 (11-12-24 @ 04:46) (92/53 - 161/84)  RR: 18 (11-12-24 @ 04:46) (18 - 20)  SpO2: 100% (11-12-24 @ 05:07) (100% - 100%)    PHYSICAL EXAM:  HEENT:   [ ]Tracheostomy:  [ ]Pupils equal  [ ]No oral lesions  [ ]Abnormal    SKIN  [ ]No Rash  [ ] Abnormal  [ ] pressure    CARDIAC  [ ]Regular  [ ]Abnormal    PULMONARY  [ ]Bilateral Clear Breath Sounds  [ ]Normal Excursion  [ ]Abnormal    GI  [ ]PEG      [ ] +BS		              [ ]Soft, nondistended, nontender	  [ ]Abnormal    MUSCULOSKELETAL                                   [ ]Bedbound                 [ ]Abnormal    [ ]Ambulatory/OOB to chair                           EXTREMITIES                                         [ ]Normal  [ ]Edema                           NEUROLOGIC  [ ] Normal, non focal  [ ] Focal findings:    PSYCHIATRIC  [ ]Alert and appropriate  [ ] Sedated	 [ ]Agitated    :  Mcbride: [ ] Yes, if yes: Date of Placement:                   [  ] No    LINES: Central Lines [ ] Yes, if yes: Date of Placement                                     [  ] No    HOSPITAL MEDICATIONS:  MEDICATIONS  (STANDING):  albuterol/ipratropium for Nebulization 3 milliLiter(s) Nebulizer every 6 hours  AQUAPHOR (petrolatum Ointment) 1 Application(s) Topical once  artificial  tears Solution 1 Drop(s) Both EYES every 6 hours  ascorbic acid 500 milliGRAM(s) Oral daily  Biotene Dry Mouth Oral Rinse 5 milliLiter(s) Swish and Spit every 6 hours  calcium carbonate 1250 mG  + Vitamin D (OsCal 500 + D) 1 Tablet(s) Oral <User Schedule>  cefepime   IVPB 2000 milliGRAM(s) IV Intermittent every 8 hours  chlorhexidine 0.12% Liquid 15 milliLiter(s) Oral Mucosa every 12 hours  chlorhexidine 4% Liquid 1 Application(s) Topical daily  dextrose 5%. 1000 milliLiter(s) (50 mL/Hr) IV Continuous <Continuous>  dextrose 5%. 1000 milliLiter(s) (50 mL/Hr) IV Continuous <Continuous>  dextrose 5%. 1000 milliLiter(s) (50 mL/Hr) IV Continuous <Continuous>  dextrose 5%. 1000 milliLiter(s) (100 mL/Hr) IV Continuous <Continuous>  dextrose 5%. 1000 milliLiter(s) (100 mL/Hr) IV Continuous <Continuous>  dextrose 50% Injectable 25 Gram(s) IV Push once  dextrose 50% Injectable 25 Gram(s) IV Push once  dextrose 50% Injectable 12.5 Gram(s) IV Push once  diclofenac sodium 1% Gel 2 Gram(s) Topical every 6 hours  erythromycin   IVPB 160 milliGRAM(s) IV Intermittent every 8 hours  escitalopram 10 milliGRAM(s) Oral <User Schedule>  gabapentin Solution 250 milliGRAM(s) Oral <User Schedule>  glucagon  Injectable 1 milliGRAM(s) IntraMuscular once  glucagon  Injectable 1 milliGRAM(s) IntraMuscular once  hemorrhoidal Ointment 1 Application(s) Rectal at bedtime  influenza  Vaccine (HIGH DOSE) 0.5 milliLiter(s) IntraMuscular once  insulin glargine Injectable (LANTUS) 10 Unit(s) SubCutaneous <User Schedule>  insulin lispro (ADMELOG) corrective regimen sliding scale   SubCutaneous every 6 hours  lactobacillus acidophilus 1 Tablet(s) Oral <User Schedule>  levothyroxine 125 MICROGram(s) Oral <User Schedule>  lidocaine   4% Patch 4 Patch Transdermal every 24 hours  melatonin 12 milliGRAM(s) Oral <User Schedule>  nystatin Powder 1 Application(s) Topical every 8 hours  pantoprazole  Injectable 40 milliGRAM(s) IV Push every 12 hours  predniSONE  Solution 5 milliGRAM(s) Enteral Tube <User Schedule>  QUEtiapine 12.5 milliGRAM(s) Oral <User Schedule>  sodium chloride 1 Gram(s) Oral every 8 hours  sodium chloride 0.65% Nasal 1 Spray(s) Both Nostrils every 6 hours  vancomycin    Solution 125 milliGRAM(s) Oral every 6 hours  witch hazel Pads 1 Application(s) Topical every 12 hours    MEDICATIONS  (PRN):  acetaminophen   Oral Liquid .. 650 milliGRAM(s) Oral every 6 hours PRN Temp greater or equal to 38C (100.4F), Mild Pain (1 - 3)  aluminum hydroxide/magnesium hydroxide/simethicone Suspension 30 milliLiter(s) Enteral Tube every 4 hours PRN Dyspepsia  oxyCODONE    Solution 10 milliGRAM(s) Oral every 6 hours PRN Severe Pain (7 - 10)  oxyCODONE    Solution 2.5 milliGRAM(s) Oral every 6 hours PRN Mild Pain (1 - 3)  oxyCODONE    Solution 5 milliGRAM(s) Oral every 6 hours PRN Moderate Pain (4 - 6)      LABS:                        7.9    21.43 )-----------( 202      ( 11 Nov 2024 07:13 )             27.9     11-12    135  |  107  |  24[H]  ----------------------------<  123[H]  4.6   |  15[L]  |  <0.30[L]    Ca    8.3[L]      12 Nov 2024 06:41  Phos  2.8     11-12  Mg     2.2     11-12    TPro  5.6[L]  /  Alb  1.8[L]  /  TBili  0.7  /  DBili  x   /  AST  23  /  ALT  10  /  AlkPhos  119  11-12      Urinalysis Basic - ( 12 Nov 2024 06:41 )    Color: x / Appearance: x / SG: x / pH: x  Gluc: 123 mg/dL / Ketone: x  / Bili: x / Urobili: x   Blood: x / Protein: x / Nitrite: x   Leuk Esterase: x / RBC: x / WBC x   Sq Epi: x / Non Sq Epi: x / Bacteria: x        Venous Blood Gas:  11-11 @ 06:00  7.34/28/73/15/96.8  VBG Lactate: 3.2    CAPILLARY BLOOD GLUCOSE    MICROBIOLOGY:     RADIOLOGY:  [ ] Reviewed and interpreted by me    Mode: AC/ CMV (Assist Control/ Continuous Mandatory Ventilation)  RR (machine): 18  TV (machine): 350  FiO2: 30  PEEP: 5  ITime: 1  MAP: 10  PIP: 31   Patient is a 74y old  Female who presents with a chief complaint of Patient admitted with Hypoxemia and episode of Bright red blood per rectum (11 Nov 2024 18:48)      Interval Events:    REVIEW OF SYSTEMS:  [ ] Positive  [ ] All other systems negative  [ ] Unable to assess ROS because ________    Vital Signs Last 24 Hrs  T(C): 36.7 (11-12-24 @ 04:46), Max: 37.2 (11-12-24 @ 00:01)  T(F): 98.1 (11-12-24 @ 04:46), Max: 98.9 (11-12-24 @ 00:01)  HR: 108 (11-12-24 @ 05:07) (89 - 120)  BP: 123/47 (11-12-24 @ 04:46) (92/53 - 161/84)  RR: 18 (11-12-24 @ 04:46) (18 - 20)  SpO2: 100% (11-12-24 @ 05:07) (100% - 100%)    PHYSICAL EXAM:  HEENT:   [X] Tracheostomy: #8 XLT cuffed Shiley   [X] PERRLA  [ ] No oral lesions  [ ] Abnormal    SKIN  [ ] No Rash  [ ] Abnormal  [X] pressure - sacral unstageable    CARDIAC  [X] Regular  [ ] Abnormal    PULMONARY  [ ] Bilateral Clear Breath Sounds  [ ] Normal Excursion  [X] Abnormal - mild coarse BS     GI  [X] PEG        [X] decreased BS  [X] increased output from Jtube		              [X] Soft, nondistended, nontender	  [X] Abnormal - old PEG site c/d/i, + rectal tube     MUSCULOSKELETAL                                   [X] Bedbound                 [ ] Abnormal    [ ] Ambulatory/OOB to chair                           EXTREMITIES                                         [ ] Normal  [X] Edema - mild generalized pitting edema                          NEUROLOGIC  [ ] Normal, non focal  [X] Focal findings: lethargic    PSYCHIATRIC  [x] Lethargic  [ ] Sedated	 [ ]Agitated    :  Mcbride: [ ] Yes, if yes: Date of Placement:                   [X] No    LINES: Central Lines [ ] Yes, if yes: Date of Placement                                     [X] No        HOSPITAL MEDICATIONS:  MEDICATIONS  (STANDING):  albuterol/ipratropium for Nebulization 3 milliLiter(s) Nebulizer every 6 hours  AQUAPHOR (petrolatum Ointment) 1 Application(s) Topical once  artificial  tears Solution 1 Drop(s) Both EYES every 6 hours  ascorbic acid 500 milliGRAM(s) Oral daily  Biotene Dry Mouth Oral Rinse 5 milliLiter(s) Swish and Spit every 6 hours  calcium carbonate 1250 mG  + Vitamin D (OsCal 500 + D) 1 Tablet(s) Oral <User Schedule>  cefepime   IVPB 2000 milliGRAM(s) IV Intermittent every 8 hours  chlorhexidine 0.12% Liquid 15 milliLiter(s) Oral Mucosa every 12 hours  chlorhexidine 4% Liquid 1 Application(s) Topical daily  dextrose 5%. 1000 milliLiter(s) (50 mL/Hr) IV Continuous <Continuous>  dextrose 5%. 1000 milliLiter(s) (50 mL/Hr) IV Continuous <Continuous>  dextrose 5%. 1000 milliLiter(s) (50 mL/Hr) IV Continuous <Continuous>  dextrose 5%. 1000 milliLiter(s) (100 mL/Hr) IV Continuous <Continuous>  dextrose 5%. 1000 milliLiter(s) (100 mL/Hr) IV Continuous <Continuous>  dextrose 50% Injectable 25 Gram(s) IV Push once  dextrose 50% Injectable 25 Gram(s) IV Push once  dextrose 50% Injectable 12.5 Gram(s) IV Push once  diclofenac sodium 1% Gel 2 Gram(s) Topical every 6 hours  erythromycin   IVPB 160 milliGRAM(s) IV Intermittent every 8 hours  escitalopram 10 milliGRAM(s) Oral <User Schedule>  gabapentin Solution 250 milliGRAM(s) Oral <User Schedule>  glucagon  Injectable 1 milliGRAM(s) IntraMuscular once  glucagon  Injectable 1 milliGRAM(s) IntraMuscular once  hemorrhoidal Ointment 1 Application(s) Rectal at bedtime  influenza  Vaccine (HIGH DOSE) 0.5 milliLiter(s) IntraMuscular once  insulin glargine Injectable (LANTUS) 10 Unit(s) SubCutaneous <User Schedule>  insulin lispro (ADMELOG) corrective regimen sliding scale   SubCutaneous every 6 hours  lactobacillus acidophilus 1 Tablet(s) Oral <User Schedule>  levothyroxine 125 MICROGram(s) Oral <User Schedule>  lidocaine   4% Patch 4 Patch Transdermal every 24 hours  melatonin 12 milliGRAM(s) Oral <User Schedule>  nystatin Powder 1 Application(s) Topical every 8 hours  pantoprazole  Injectable 40 milliGRAM(s) IV Push every 12 hours  predniSONE  Solution 5 milliGRAM(s) Enteral Tube <User Schedule>  QUEtiapine 12.5 milliGRAM(s) Oral <User Schedule>  sodium chloride 1 Gram(s) Oral every 8 hours  sodium chloride 0.65% Nasal 1 Spray(s) Both Nostrils every 6 hours  vancomycin    Solution 125 milliGRAM(s) Oral every 6 hours  witch hazel Pads 1 Application(s) Topical every 12 hours    MEDICATIONS  (PRN):  acetaminophen   Oral Liquid .. 650 milliGRAM(s) Oral every 6 hours PRN Temp greater or equal to 38C (100.4F), Mild Pain (1 - 3)  aluminum hydroxide/magnesium hydroxide/simethicone Suspension 30 milliLiter(s) Enteral Tube every 4 hours PRN Dyspepsia  oxyCODONE    Solution 10 milliGRAM(s) Oral every 6 hours PRN Severe Pain (7 - 10)  oxyCODONE    Solution 2.5 milliGRAM(s) Oral every 6 hours PRN Mild Pain (1 - 3)  oxyCODONE    Solution 5 milliGRAM(s) Oral every 6 hours PRN Moderate Pain (4 - 6)      LABS:                        7.9    21.43 )-----------( 202      ( 11 Nov 2024 07:13 )             27.9     11-12    135  |  107  |  24[H]  ----------------------------<  123[H]  4.6   |  15[L]  |  <0.30[L]    Ca    8.3[L]      12 Nov 2024 06:41  Phos  2.8     11-12  Mg     2.2     11-12    TPro  5.6[L]  /  Alb  1.8[L]  /  TBili  0.7  /  DBili  x   /  AST  23  /  ALT  10  /  AlkPhos  119  11-12      Urinalysis Basic - ( 12 Nov 2024 06:41 )    Color: x / Appearance: x / SG: x / pH: x  Gluc: 123 mg/dL / Ketone: x  / Bili: x / Urobili: x   Blood: x / Protein: x / Nitrite: x   Leuk Esterase: x / RBC: x / WBC x   Sq Epi: x / Non Sq Epi: x / Bacteria: x        Venous Blood Gas:  11-11 @ 06:00  7.34/28/73/15/96.8  VBG Lactate: 3.2    CAPILLARY BLOOD GLUCOSE    MICROBIOLOGY:     RADIOLOGY:  [ ] Reviewed and interpreted by me    Mode: AC/ CMV (Assist Control/ Continuous Mandatory Ventilation)  RR (machine): 18  TV (machine): 350  FiO2: 30  PEEP: 5  ITime: 1  MAP: 10  PIP: 31

## 2024-11-12 NOTE — PROGRESS NOTE ADULT - NS ATTEND AMEND GEN_ALL_CORE FT
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 Found to have Pseudomonas aeruginosa in sputum culture and urine culture with ESBL E. coli s/p course of meropenem. Course complicated by antibiotic-associated diarrhea.  Now with increased TFs output from PEG and diarrhea, concern for aspiration d/t copious output (noted fluid in stomach on bedside US).        - C/W current pain regimen, well controlled. C/W escitalopram and seroquel  - pt remains lethargic  but episodically back to baseline, likely septic encephalopathy  - CTH w/o acute changes  - continue lung protective ventilation. Does poorly on PS trials. Continue Duonebs.   - cont PO Vanc for Cdiff, ID favored continuing Cefepime for PNA d/t concern for higher rate of resistance with Ceftazidime (prior cx's w/ PsA sputum and esbl ecoli uti), ID will speak with family re: decision as family had been against d/t mental status changes  - check MRSA screen, if WBC continues to increase then may need MRSA coverage  - check VBG/lactate given HCO3 15  - CT Abd/Pelvis showing PEJ in proximal duodenum, contact Dr. Vázquez with GI for respositioning so that can resume TFs  - continue Erythromycin to help with gut motility  - new rectal tube placed 11/12  - Anemia multifactorial, hemorrhoids, AOCD. s/p EGD without active bleeding, f/u Heme reccs, transfuse for Hb 7  - cont to monitor FS  , TFs on hold, will need D5 gtt and FS q4h - continue Lantus 10, hold Admelog  -  RA, on home prednisone 5 mg daily   - monitor hypoNa, tsh normal   - cont wound care to sacrum for decub (also likely moisture related rash areas on back)   - DVT ppx- only scd for now given significant anemia a few days ago

## 2024-11-12 NOTE — PROGRESS NOTE ADULT - ASSESSMENT
73 yo woman PMH T2DM on insulin, HTN, HLD, hypothyroidism, RA on Prednisone, Fibromyalgia, cerebral aneurysm s/p repair, chronic hypercapnic respiratory failure s/p trach (vent dependent) and chronic PEG 2022, recurrent C. diff infection , persistent GJ tube leaks now s/p GC fistula repair 8/12  admitted 10/7/24 with resp distress and found to have RML opacity     Antibiotics  Ceftriaxone 10/7  Azithro 10/7  Cefepime 10/7--> 10/12  meropenem 10/15 --> 10/23  IV Vanco 10/17 --> 10/21  Vanco via NGT 10/24; 11/1-->  Metronidazole 11/3 --> 11/7  Cefepime 11/5 --> 11/10; 11/11-->  Erythromycin 11/11-->      10/10 melena, anemia, blood tx  10/14 EGD for GJ exchange and piror PEG site closure  One benign-appearing, intrinsic moderate stenosis was found in the upper third of the        esophagus. The stenosis was traversed.       The cardia and gastric fundus were normal.       A gastric tube with tip in the jejunum was found in the gastric body. The PEG required        removal because it was leaking. The J tube extension (12F) was removed first. An externally        removable 24 Fr EndoVive Safety gastrostomy tube was lubricated. The guide wire was passed        through the existing G-tube port and snared endoscopically. The endoscope and snare were then        removed, pulling the wire out through the mouth. The g-tube was tied to the guidewire, pulled        through the mouth into the stomach and then pulled out from the stomach through the skin. The        bumper was attached to the gastrostomy tube. The feeding tube was then cut to an appropriate        length. The final position of the gastrostomy tube was confirmed by relook endoscopy, and        skin marking noted to be 3 cm at the external bumper. The final tension and compression of        the abdominal wall by the PEG tube and external bumper were checked and revealed that the        bumper was moderately tight and mildly deforming the skin. The J tube extension (12 F        EndoVive Jejunal feeding tube) was advanced into the stomach. The tip of the J tube had a        loop thread that was captured with a Resolution clip. The thread and the J tube extension        were advanced to the proximal jejunum, and the endoclip along with the tip of the J tube was        attached to the wall securely. The J tube extension was capped, and the tube site was cleaned        and dressed.       A small fistula was found on the anterior wall of the gastric body related to prior G tube        site. Coagulation of tissue near the fistula using argon plasma at 1.2 liters/minute and 35        peraza was successful. To closure the gastrocutaneous fistula, four hemostatic clips were        successfully placed (MR conditional, two 16 mm DuraClip and 2 Mantis clips.       The examined duodenum was normal.    10/14, 10/15 Fever, transient hypoxia post procedure  10/15 ProCalcitonin = 8.48 suggestive of bacterial process; BCx x2 NGTD; Trach: Pseudomonas   - though benign mg stain  10;16 Leukocytosis WBC = 14.26  10/17 fever, hypotension, abd distension, no diarrhea noted this am WBC = 15.02; Rising Procalcitonin = 38.49; Obstructed J tube   10/18  lost IV access re-gained  - CT scan late this afternoon  WBC = 8.68; Rising Procalcitonin >100  10/18 imaging suggests multifocal pneumonia  10/21 improved chest exam, Procalcitonin level considerably decreased, decreased FiO2  - afebrile since 10/18  10/21 UGI endoscopy done  Findings:       The esophagus was normal. A fistula was found on the anterior wall of the gastric body.       There was evidence of a gastrostomy present in the gastric body. The patient was placed in        the supine position. The endoscope was advanced to the jejunum and the prior placed J tube        with loop attached to the wall and the loop thread was cut by endoclip. The J tube and the G        tube were removed. The gastrostomy fistula was utilized and an Avanos 22 F        Gastrostomy/Jejunal tube was advanced. The jejunal tip was advanced through the pylorus and        into the duodenum. The endoscope was then advanced to the duodenum and the loop thread at the        tip of the J tube was captured and advanced to the proximal jejunum. The loop thread was        clipped to wall by a [employer] Resolution clip. The J tube flushed easily and the G        tube decompress the abdomen easily. The internal balloon was insufflated with 10 cc of water        to hold the GJ tube in place. The outside marking was at 3.  10/23  no distress, liquid stools noted  - Meropenem discontinued  GI PCR NEG  10/24  persistent diarrhea  Cdiff NEG; enteral vanco stopped and Immodium provided  10/28 low grade temps, mild leukocytosis  10/30 blood transfusion  11/1  fever  +Cdiff  11/3 continued fever  - +BC Stph epi most likely procurement contaminant,  modest diarrhea reported  - daughter described increased diarrhea in evenings - Pseudomonas airway colonization is long term, no suggestion of pneumonia at present, sacral decub remains superifical will granulated without signs of infection  11/4  - fever, abnormal chest exam though FIO2 still 30%  rectal tube inserted for increased diarrhea  11/5 lethargy, fever, leukocytosis, increased lactate, hyponatremia  11/6 sputum gram stain suggests persistent Pseudomonas colonization  - continued fever, leukocytosis  11/7 unclear if LLL consolidation represents infection. Repeat Procalcitonin  11/7= 2.92 decreased  11/8 continued fevers  +diarrhea, lethargy continues  to complete Cefepime for LLL pneumonia v atelectasis tomorrow 11/9 11/11 increased fever with increased WBC and increased Procalcitonin off Cefepime noted, which was resumed  Concern if feed is refluxing back to the stomach versus J tube now in the stomach.     imaging show G tube in 1st part of duodenum. there are bibasilar L>R consolidations - likely continual reaspiration  11/12 persistent leukocytosis    Issues  recurrent Cdiff  cerebral aneurysm s/p repair  chronic hypercapnic respiratory failure s/p trach (vent dependent) and chronic PEG 2022  anemia  - had iron deficiency  Pseudomonas aeruginosa upper airway colonization  (most recent isolate Resist to Cipro/Levo)  T2DM on insulin  10.14.24 -A1C: 5.8%  HTN  HLD  hypothyroidism  RA on Prednisone  Fibromyalgia  debility  sacral ulcer largely healed  malnourished/chronic catabolic state  aspiration      Suggest  Continue vanco 125 q 6 hours via J tube 11/1 -->  Continue Cefepime

## 2024-11-13 NOTE — PROGRESS NOTE ADULT - SUBJECTIVE AND OBJECTIVE BOX
Patient is a 74y old  Female who presents with a chief complaint of Patient admitted with Hypoxemia and episode of Bright red blood per rectum (12 Nov 2024 18:48)      Interval Events:    REVIEW OF SYSTEMS:  [ ] Positive  [ ] All other systems negative  [ ] Unable to assess ROS because ________    Vital Signs Last 24 Hrs  T(C): 36.7 (11-13-24 @ 04:58), Max: 37.4 (11-12-24 @ 10:49)  T(F): 98.1 (11-13-24 @ 04:58), Max: 99.3 (11-12-24 @ 10:49)  HR: 88 (11-13-24 @ 05:47) (82 - 114)  BP: 155/64 (11-13-24 @ 04:58) (98/48 - 155/64)  RR: 20 (11-13-24 @ 04:58) (18 - 20)  SpO2: 100% (11-13-24 @ 05:47) (99% - 100%)PHYSICAL EXAM:  HEENT:   [ ]Tracheostomy:  [ ]Pupils equal  [ ]No oral lesions  [ ]Abnormal        SKIN  [ ]No Rash  [ ] Abnormal  [ ] pressure    CARDIAC  [ ]Regular  [ ]Abnormal    PULMONARY  [ ]Bilateral Clear Breath Sounds  [ ]Normal Excursion  [ ]Abnormal    GI  [ ]PEG      [ ] +BS		              [ ]Soft, nondistended, nontender	  [ ]Abnormal    MUSCULOSKELETAL                                   [ ]Bedbound                 [ ]Abnormal    [ ]Ambulatory/OOB to chair                           EXTREMITIES                                         [ ]Normal  [ ]Edema                           NEUROLOGIC  [ ] Normal, non focal  [ ] Focal findings:    PSYCHIATRIC  [ ]Alert and appropriate  [ ] Sedated	 [ ]Agitated    :  Mcbride: [ ] Yes, if yes: Date of Placement:                   [  ] No    LINES: Central Lines [ ] Yes, if yes: Date of Placement                                     [  ] No    HOSPITAL MEDICATIONS:  MEDICATIONS  (STANDING):  albuterol/ipratropium for Nebulization 3 milliLiter(s) Nebulizer every 6 hours  artificial  tears Solution 1 Drop(s) Both EYES every 6 hours  ascorbic acid 500 milliGRAM(s) Oral daily  Biotene Dry Mouth Oral Rinse 5 milliLiter(s) Swish and Spit every 6 hours  calcium carbonate 1250 mG  + Vitamin D (OsCal 500 + D) 1 Tablet(s) Oral <User Schedule>  cefepime   IVPB 2000 milliGRAM(s) IV Intermittent every 8 hours  chlorhexidine 0.12% Liquid 15 milliLiter(s) Oral Mucosa every 12 hours  chlorhexidine 4% Liquid 1 Application(s) Topical daily  dextrose 5%. 1000 milliLiter(s) (50 mL/Hr) IV Continuous <Continuous>  dextrose 5%. 1000 milliLiter(s) (100 mL/Hr) IV Continuous <Continuous>  dextrose 5%. 1000 milliLiter(s) (50 mL/Hr) IV Continuous <Continuous>  dextrose 5%. 1000 milliLiter(s) (100 mL/Hr) IV Continuous <Continuous>  dextrose 5%. 1000 milliLiter(s) (50 mL/Hr) IV Continuous <Continuous>  dextrose 50% Injectable 25 Gram(s) IV Push once  dextrose 50% Injectable 25 Gram(s) IV Push once  dextrose 50% Injectable 12.5 Gram(s) IV Push once  diclofenac sodium 1% Gel 2 Gram(s) Topical every 6 hours  erythromycin   IVPB 160 milliGRAM(s) IV Intermittent every 8 hours  escitalopram 10 milliGRAM(s) Oral <User Schedule>  fentaNYL   Patch  12 MICROgram(s)/Hr 1 Patch Transdermal every 72 hours  fentaNYL   Patch  25 MICROgram(s)/Hr 1 Patch Transdermal every 72 hours  gabapentin Solution 250 milliGRAM(s) Oral <User Schedule>  glucagon  Injectable 1 milliGRAM(s) IntraMuscular once  glucagon  Injectable 1 milliGRAM(s) IntraMuscular once  hemorrhoidal Ointment 1 Application(s) Rectal at bedtime  influenza  Vaccine (HIGH DOSE) 0.5 milliLiter(s) IntraMuscular once  insulin glargine Injectable (LANTUS) 5 Unit(s) SubCutaneous <User Schedule>  insulin lispro (ADMELOG) corrective regimen sliding scale   SubCutaneous every 6 hours  lactobacillus acidophilus 1 Tablet(s) Oral <User Schedule>  levothyroxine 125 MICROGram(s) Oral <User Schedule>  lidocaine   4% Patch 4 Patch Transdermal every 24 hours  melatonin 12 milliGRAM(s) Oral <User Schedule>  nystatin Powder 1 Application(s) Topical every 8 hours  pantoprazole  Injectable 40 milliGRAM(s) IV Push every 12 hours  predniSONE  Solution 5 milliGRAM(s) Enteral Tube <User Schedule>  QUEtiapine 12.5 milliGRAM(s) Oral <User Schedule>  sodium chloride 1 Gram(s) Oral every 8 hours  sodium chloride 0.65% Nasal 1 Spray(s) Both Nostrils every 6 hours  vancomycin    Solution 125 milliGRAM(s) Oral every 6 hours  witch hazel Pads 1 Application(s) Topical every 12 hours    MEDICATIONS  (PRN):  acetaminophen   Oral Liquid .. 650 milliGRAM(s) Oral every 6 hours PRN Temp greater or equal to 38C (100.4F), Mild Pain (1 - 3)  aluminum hydroxide/magnesium hydroxide/simethicone Suspension 30 milliLiter(s) Enteral Tube every 4 hours PRN Dyspepsia  oxyCODONE    Solution 5 milliGRAM(s) Oral every 6 hours PRN Moderate Pain (4 - 6)  oxyCODONE    Solution 10 milliGRAM(s) Oral every 6 hours PRN Severe Pain (7 - 10)  oxyCODONE    Solution 2.5 milliGRAM(s) Oral every 6 hours PRN Mild Pain (1 - 3)      LABS:                        8.2    15.15 )-----------( 183      ( 13 Nov 2024 06:39 )             27.2     11-13    135  |  107  |  21  ----------------------------<  133[H]  4.5   |  15[L]  |  <0.30[L]    Ca    8.4      13 Nov 2024 06:38  Phos  3.8     11-13  Mg     1.8     11-13    TPro  6.0  /  Alb  2.0[L]  /  TBili  1.1  /  DBili  x   /  AST  22  /  ALT  10  /  AlkPhos  103  11-13      Urinalysis Basic - ( 13 Nov 2024 06:38 )    Color: x / Appearance: x / SG: x / pH: x  Gluc: 133 mg/dL / Ketone: x  / Bili: x / Urobili: x   Blood: x / Protein: x / Nitrite: x   Leuk Esterase: x / RBC: x / WBC x   Sq Epi: x / Non Sq Epi: x / Bacteria: x        Venous Blood Gas:  11-13 @ 06:39  7.23/37/67/16/93.6  VBG Lactate: 1.5  Venous Blood Gas:  11-12 @ 13:10  7.30/31/112/15/99.1  VBG Lactate: 1.8    CAPILLARY BLOOD GLUCOSE    MICROBIOLOGY:     RADIOLOGY:  [ ] Reviewed and interpreted by me    Mode: AC/ CMV (Assist Control/ Continuous Mandatory Ventilation)  RR (machine): 20  TV (machine): 350  FiO2: 30  PEEP: 5  ITime: 1  MAP: 11  PIP: 28   Patient is a 74y old  Female who presents with a chief complaint of Patient admitted with Hypoxemia and episode of Bright red blood per rectum (12 Nov 2024 18:48)      Interval Events:  Npo for peg revision w/ Dr Rosales     REVIEW OF SYSTEMS:  [ ] Positive  [ ] All other systems negative  [x ] Unable to assess ROS because ___Minimally responsive _____    Vital Signs Last 24 Hrs  T(C): 36.7 (11-13-24 @ 04:58), Max: 37.4 (11-12-24 @ 10:49)  T(F): 98.1 (11-13-24 @ 04:58), Max: 99.3 (11-12-24 @ 10:49)  HR: 88 (11-13-24 @ 05:47) (82 - 114)  BP: 155/64 (11-13-24 @ 04:58) (98/48 - 155/64)  RR: 20 (11-13-24 @ 04:58) (18 - 20)  SpO2: 100% (11-13-24 @ 05:47) (99% - 100%)    PHYSICAL EXAM:    HEENT:   [X] Tracheostomy: #8 distal XLT cuffed Shiley   [X] PERRLA  [ ] No oral lesions  [ ] Abnormal    SKIN  [ ] No Rash  [ ] Abnormal  [X] pressure - sacral unstageable    CARDIAC  [X] Regular  [ ] Abnormal    PULMONARY  [ ] Bilateral Clear Breath Sounds  [ ] Normal Excursion  [X] Abnormal - mild coarse BS     GI  [X] PEG site c/d/i      [X] decreased BS	              [X] Soft, nondistended, nontender	  [X] Abnormal - old PEG site with small opening c/d/i,     MUSCULOSKELETAL                                   [X] Bedbound                 [ ] Abnormal    [ ] Ambulatory/OOB to chair                           EXTREMITIES                                         [ ] Normal  [X] Edema - mild generalized pitting edema                          NEUROLOGIC  [ ] Normal, non focal  [X] Focal findings: lethargic, not following commands, not moving extremities x 4, minimally withdrawals to noxious stimuli     PSYCHIATRIC  [x] Lethargic and UTO   [ ] Sedated	 [ ]Agitated    :  Mcbride: [ ] Yes, if yes: Date of Placement:                   [X] No    LINES: Central Lines [ ] Yes, if yes: Date of Placement                                     [X] No    HOSPITAL MEDICATIONS:  MEDICATIONS  (STANDING):  albuterol/ipratropium for Nebulization 3 milliLiter(s) Nebulizer every 6 hours  artificial  tears Solution 1 Drop(s) Both EYES every 6 hours  ascorbic acid 500 milliGRAM(s) Oral daily  Biotene Dry Mouth Oral Rinse 5 milliLiter(s) Swish and Spit every 6 hours  calcium carbonate 1250 mG  + Vitamin D (OsCal 500 + D) 1 Tablet(s) Oral <User Schedule>  cefepime   IVPB 2000 milliGRAM(s) IV Intermittent every 8 hours  chlorhexidine 0.12% Liquid 15 milliLiter(s) Oral Mucosa every 12 hours  chlorhexidine 4% Liquid 1 Application(s) Topical daily  dextrose 5%. 1000 milliLiter(s) (50 mL/Hr) IV Continuous <Continuous>  dextrose 5%. 1000 milliLiter(s) (100 mL/Hr) IV Continuous <Continuous>  dextrose 5%. 1000 milliLiter(s) (50 mL/Hr) IV Continuous <Continuous>  dextrose 5%. 1000 milliLiter(s) (100 mL/Hr) IV Continuous <Continuous>  dextrose 5%. 1000 milliLiter(s) (50 mL/Hr) IV Continuous <Continuous>  dextrose 50% Injectable 25 Gram(s) IV Push once  dextrose 50% Injectable 25 Gram(s) IV Push once  dextrose 50% Injectable 12.5 Gram(s) IV Push once  diclofenac sodium 1% Gel 2 Gram(s) Topical every 6 hours  erythromycin   IVPB 160 milliGRAM(s) IV Intermittent every 8 hours  escitalopram 10 milliGRAM(s) Oral <User Schedule>  fentaNYL   Patch  12 MICROgram(s)/Hr 1 Patch Transdermal every 72 hours  fentaNYL   Patch  25 MICROgram(s)/Hr 1 Patch Transdermal every 72 hours  gabapentin Solution 250 milliGRAM(s) Oral <User Schedule>  glucagon  Injectable 1 milliGRAM(s) IntraMuscular once  glucagon  Injectable 1 milliGRAM(s) IntraMuscular once  hemorrhoidal Ointment 1 Application(s) Rectal at bedtime  influenza  Vaccine (HIGH DOSE) 0.5 milliLiter(s) IntraMuscular once  insulin glargine Injectable (LANTUS) 5 Unit(s) SubCutaneous <User Schedule>  insulin lispro (ADMELOG) corrective regimen sliding scale   SubCutaneous every 6 hours  lactobacillus acidophilus 1 Tablet(s) Oral <User Schedule>  levothyroxine 125 MICROGram(s) Oral <User Schedule>  lidocaine   4% Patch 4 Patch Transdermal every 24 hours  melatonin 12 milliGRAM(s) Oral <User Schedule>  nystatin Powder 1 Application(s) Topical every 8 hours  pantoprazole  Injectable 40 milliGRAM(s) IV Push every 12 hours  predniSONE  Solution 5 milliGRAM(s) Enteral Tube <User Schedule>  QUEtiapine 12.5 milliGRAM(s) Oral <User Schedule>  sodium chloride 1 Gram(s) Oral every 8 hours  sodium chloride 0.65% Nasal 1 Spray(s) Both Nostrils every 6 hours  vancomycin    Solution 125 milliGRAM(s) Oral every 6 hours  witch hazel Pads 1 Application(s) Topical every 12 hours    MEDICATIONS  (PRN):  acetaminophen   Oral Liquid .. 650 milliGRAM(s) Oral every 6 hours PRN Temp greater or equal to 38C (100.4F), Mild Pain (1 - 3)  aluminum hydroxide/magnesium hydroxide/simethicone Suspension 30 milliLiter(s) Enteral Tube every 4 hours PRN Dyspepsia  oxyCODONE    Solution 5 milliGRAM(s) Oral every 6 hours PRN Moderate Pain (4 - 6)  oxyCODONE    Solution 10 milliGRAM(s) Oral every 6 hours PRN Severe Pain (7 - 10)  oxyCODONE    Solution 2.5 milliGRAM(s) Oral every 6 hours PRN Mild Pain (1 - 3)      LABS:                        8.2    15.15 )-----------( 183      ( 13 Nov 2024 06:39 )             27.2     11-13    135  |  107  |  21  ----------------------------<  133[H]  4.5   |  15[L]  |  <0.30[L]    Ca    8.4      13 Nov 2024 06:38  Phos  3.8     11-13  Mg     1.8     11-13    TPro  6.0  /  Alb  2.0[L]  /  TBili  1.1  /  DBili  x   /  AST  22  /  ALT  10  /  AlkPhos  103  11-13      Urinalysis Basic - ( 13 Nov 2024 06:38 )    Color: x / Appearance: x / SG: x / pH: x  Gluc: 133 mg/dL / Ketone: x  / Bili: x / Urobili: x   Blood: x / Protein: x / Nitrite: x   Leuk Esterase: x / RBC: x / WBC x   Sq Epi: x / Non Sq Epi: x / Bacteria: x        Venous Blood Gas:  11-13 @ 06:39  7.23/37/67/16/93.6  VBG Lactate: 1.5  Venous Blood Gas:  11-12 @ 13:10  7.30/31/112/15/99.1  VBG Lactate: 1.8    CAPILLARY BLOOD GLUCOSE    MICROBIOLOGY:     RADIOLOGY:  [ ] Reviewed and interpreted by me    Mode: AC/ CMV (Assist Control/ Continuous Mandatory Ventilation)  RR (machine): 20  TV (machine): 350  FiO2: 30  PEEP: 5  ITime: 1  MAP: 11  PIP: 28

## 2024-11-13 NOTE — PROGRESS NOTE ADULT - SUBJECTIVE AND OBJECTIVE BOX
Follow Up:  fevers, aspiration pneumonia, cdiff    Interval History/ROS:  lethargic    Allergies  walnut (Unknown)  metronidazole (Rash)  Lyrica (Unknown)  penicillin (Unknown)  Pineapple (Unknown)  Tagamet (Unknown)  heparin (Unknown)  Pecans (Unknown)  Hazelnut (Unknown)  meropenem (Rash)        ANTIMICROBIALS:  cefepime   IVPB 2000 every 8 hours  erythromycin   IVPB 160 every 8 hours  vancomycin    Solution 125 every 6 hours      OTHER MEDS:  MEDICATIONS  (STANDING):  acetaminophen   Oral Liquid .. 650 every 6 hours PRN  albuterol/ipratropium for Nebulization 3 every 6 hours  aluminum hydroxide/magnesium hydroxide/simethicone Suspension 30 every 4 hours PRN  dextrose 50% Injectable 25 once  dextrose 50% Injectable 12.5 once  dextrose 50% Injectable 25 once  escitalopram 10 <User Schedule>  fentaNYL   Patch  12 MICROgram(s)/Hr 1 every 72 hours  fentaNYL   Patch  25 MICROgram(s)/Hr 1 every 72 hours  gabapentin Solution 250 <User Schedule>  glucagon  Injectable 1 once  glucagon  Injectable 1 once  influenza  Vaccine (HIGH DOSE) 0.5 once  insulin glargine Injectable (LANTUS) 5 <User Schedule>  insulin lispro (ADMELOG) corrective regimen sliding scale  every 6 hours  levothyroxine 125 <User Schedule>  melatonin 12 <User Schedule>  oxyCODONE    Solution 5 every 6 hours PRN  oxyCODONE    Solution 10 every 6 hours PRN  oxyCODONE    Solution 2.5 every 6 hours PRN  pantoprazole  Injectable 40 every 12 hours  predniSONE  Solution 5 <User Schedule>  QUEtiapine 12.5 <User Schedule>  sodium chloride 3%  Inhalation 4 every 6 hours      Vital Signs Last 24 Hrs  T(C): 37.1 (13 Nov 2024 15:07), Max: 38.2 (13 Nov 2024 13:07)  T(F): 98.8 (13 Nov 2024 15:07), Max: 100.7 (13 Nov 2024 13:07)  HR: 102 (13 Nov 2024 17:00) (82 - 111)  BP: 122/59 (13 Nov 2024 17:00) (103/54 - 155/64)  BP(mean): 111 (13 Nov 2024 13:27) (111 - 111)  RR: 24 (13 Nov 2024 17:00) (16 - 24)  SpO2: 100% (13 Nov 2024 17:00) (100% - 100%)    Parameters below as of 13 Nov 2024 17:00  Patient On (Oxygen Delivery Method): ventilator        PHYSICAL EXAM:  General: NAD, Non-toxic  Neurology: Asleep  Respiratory: Clear to auscultation bilaterally  CV: RRR, S1S2, no murmurs, rubs or gallops  Abdominal: Soft, Non-tender, non-distended, normal bowel sounds  Extremities: gneralized edema  Line Sites: Clear  Skin: No rash                          8.2    15.15 )-----------( 183      ( 13 Nov 2024 06:39 )             27.2   WBC Count: 15.15 (11-13 @ 06:39)  WBC Count: 22.66 (11-12 @ 06:41)  WBC Count: 21.43 (11-11 @ 07:13)  WBC Count: 15.65 (11-10 @ 07:02)  WBC Count: 12.79 (11-09 @ 06:39)    11.13.24 Procalcitonin: 22.55  (11.11.24 Procalcitonin: 43.00:     11-13    135  |  107  |  21  ----------------------------<  133[H]  4.5   |  15[L]  |  <0.30[L]    Ca    8.4      13 Nov 2024 06:38  Phos  3.8     11-13  Mg     1.8     11-13    TPro  6.0  /  Alb  2.0[L]  /  TBili  1.1  /  DBili  x   /  AST  22  /  ALT  10  /  AlkPhos  103  11-13      MICROBIOLOGY:  Trach Asp Tracheal Aspirate  11-10-24 --  --    Numerous polymorphonuclear leukocytes per low power field  Rare Squamous epithelial cells per low power field  Rare Gram positive cocci in pairs per oil power field  Few Gram Negative Rods per oil power field  Few Gram Positive Rods per oil power field      .Blood BLOOD  11-10-24   No growth at 72 Hours  --  --      .Sputum Sputum  11-04-24   Moderate Mixed gram negative rods including  Moderate Pseudomonas aeruginosa  Commensal vinay consistent with body site  --  Pseudomonas aeruginosa      Clean Catch Clean Catch (Midstream)  11-03-24   No growth  --  --      .Blood BLOOD  11-03-24   No growth at 5 days  --  --      .Blood BLOOD  11-03-24   No growth at 5 days  --  --      .Blood BLOOD  11-01-24   Growth in anaerobic bottle: Staphylococcus epidermidis  Isolation of Coagulase negative Staphylococcus from single blood culture  sets may represent  contamination.       Catheterized Catheterized  11-01-24   No growth  --  --      .Blood BLOOD  11-01-24   No growth at 5 days  --  --      Trach Asp Tracheal Aspirate  10-27-24   Moderate Pseudomonas aeruginosa  Moderate Pseudomonas aeruginosa #2  Multiple Morphological Strains  Commensal vinay consistent with body site  --  Pseudomonas aeruginosa  Pseudomonas aeruginosa      .Blood BLOOD  10-27-24   No growth at 5 days  --  --      .Blood BLOOD  10-17-24   No growth at 5 days  --  --      .Blood BLOOD  10-17-24   No growth at 5 days  --  --      .Blood BLOOD  10-15-24   No growth at 5 days  --  --      Trach Asp Tracheal Aspirate  10-15-24   Mixed gram negative rods including  Numerous Pseudomonas aeruginosa  Commensal vinay consistent with body site  --  Pseudomonas aeruginosa      .Blood BLOOD  10-15-24   No growth at 5 days  --  --    RADIOLOGY:  < from: CT Abdomen and Pelvis w/ IV Cont (11.11.24 @ 21:23) >  IMPRESSION:  Bibasilar consolidation and groundglass opacity, consistent with   atelectasis and/or pneumonia.    Liquid stool in nondilated colon. Transversely oriented and low lying   rectal catheter balloon. Repositioning should be considered.    Soft tissue infiltration overlying the distal sacrum and coccyx with mild   bony erosive changes suggesting osteomyelitis, similar to prior. No   drainable fluid collection.    < end of copied text >      Zion Newman MD; Division of Infectious Disease; Pager: [phone]; nights and weekends: [phone]

## 2024-11-13 NOTE — PROGRESS NOTE ADULT - PROBLEM SELECTOR PLAN 2
[] PNA / Intermittent fevers  - Sputum cx 10/8: PSA  - CXR 10/7: Opacification of the right middle lobe may represent pneumonia versus atelectasis  - CT A/P 10/18: ? Multifocal pneumonia  - Completed Initial course of cefepime on 10/12  - Sputum 11/4: PSA  - Repeat CT C/A/P 11/6: Consolidative opacity LLL  - Cefepime restarted 11/5-->11/10; in setting of recurrent fevers  - Febrile morning 11/10 and overnight 11/11 -- ofirmev given, pan cultures sent on 11/10 -- procal 1.11 --> 43; WBC 15.65 --> 21.43; increased output from J tube - possible aspiration  - Infectious w/u sent 11/10 -- BCx negative, trach aspirate cx pending, UA negative  - CT CAP on 11/11 showed left greater than right basilar consolidation and patchy groundglass opacity in both lungs consistent with atelectasis and/or pneumonia.   - per ID -- restart cefepime  - re-sent MRSA/MSSA PCR 11/12 (prior from 10/10)    [] C.Diff   - Stool C.diff: + 11/1   - S/p Flagyl 11/3-11/8  - Continue enteral Vanco  - + RT remains in place; monitor output

## 2024-11-13 NOTE — PROGRESS NOTE ADULT - PROBLEM SELECTOR PLAN 3
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  No focal intra-abdominal/pelvic or subcutaneous collections.  - Occult 10/7: Positive  - Transfused on 10/10 and 10/30 with appropriate response in H+H  - heme consult appreciated  - Hg 7.0 on 11/12 -- 1u pRBC given

## 2024-11-13 NOTE — PROGRESS NOTE ADULT - PROBLEM SELECTOR PLAN 9
- Patient has known hx of prior G-Tube stoma leak   - S/p EGD for closure of Gastric Fistula (8/12) w/ Total of 3 Mantis clips and two 22 mm Microtech clips by GI Dr Rosales   - Old stoma peg site with mild clear drainage s/p repair of fistula  - 10/14 G-J exchanged by GI and clips applied to fistula site  - 10/17 PEGJ clogged  - 10/21 PEGJ revision done, tolerating enteral feeds  - Both feeds and medications are now give through feeding/J-port  - G-port is placed to continuous bag drainage for decompression  - Continue Maalox TID for gaseous distention  - Tube study requested to evaluate for J-tube migration 11/9 -- showed tip in duodenum. Increased output from J tube, possible aspiration -- asked GI to come reassess; ECG for QT, started erythromycin (reglan contraindicated); dc TF, dc Admelog (c/w Lantus), started d5   - CT CAP on 11/11 shows feeding tube coils in the stomach with tip within the first portion of the duodenum.. No bowel obstruction.   - Rectal tube displaced when pt turned -- new rectal tube placed 11/12  - Asked Dr. Rosales to adjust J tube 11/12

## 2024-11-13 NOTE — PROGRESS NOTE ADULT - ASSESSMENT
75 yo F PMH T2DM on insulin, HTN, HLD, hypothyroidism, RA on Prednisone, Fibromyalgia, cerebral aneurysm s/p repair, chronic hypercapnic respiratory failure s/p trach (vent dependent) and chronic PEG 2022, recurrent C. diff infection (follows with Dr. Newman), persistent GJ tube leaks now s/p GC fistula repair 8/12 BIBEMS on 10/7 with daughter for respiratory distress, hypoxia to 88%.  Pt also noted to have rectal bleeding week prior to presentation requiring transfusion 5 days ago in ED as per daughter. Daughter also reports brownish discharge from G-J tube. In ED, pt afebrile, fiO2 96-98% on 40% on ventilator.  Chest X-ray w/ opacification of right lung concerning for possible PNA.  Admitted to RCU for further management. Sputum cxs grew PSA; treated with course of Cefepime. Patient with decreased H+H received 1 unit of PRBCS on 10/10.  10/14 EGD w/ Dr. Rosales for PEGJ exchange and clippings placed for closure of fistula site.  Persistent fevers treated with meropenem and vanc (d/c'd 10/20).  CT A/P without infectious source.  Continued fevers and persistent leukocytosis 11/1 CDiff positive - po vanco started, IV flagyl added (11/3-11/8). Head CT performed on 11/6 due to concern of lethargy no acute intracranial findings were noted; likely related to metabolic encephalopathy. Patient continued to have persistent fevers and was empirically treated with Cefepime (11/5-11/9) for CR PSA in sputum cx. CT C/A/P performed 11/6 c/w consolidative opacity LLL. Evening of 11/10-11/11, WBC 15 --> 21, procal 1 --> 43, lactate 3.2 -- pt also febrile. Infectious w/u sent 11/10 -- BCx negative, trach aspirate cx pending, UA negative. CT CAP on 11/11 showed left greater than right basilar consolidation and patchy groundglass opacity in both lungs consistent with atelectasis and/or pneumonia. Feeding tube coils in the stomach with tip within the first portion of the duodenum.. No bowel obstruction. Asked Dr. Rosales to adjust J tube. Per ID -- restart cefepime 11/11 - ). 73 yo F PMH T2DM on insulin, HTN, HLD, hypothyroidism, RA on Prednisone, Fibromyalgia, cerebral aneurysm s/p repair, chronic hypercapnic respiratory failure s/p trach (vent dependent) and chronic PEG 2022, recurrent C. diff infection (follows with Dr. Newman), persistent GJ tube leaks now s/p GC fistula repair 8/12 BIBEMS on 10/7 with daughter for respiratory distress, hypoxia to 88%.  Pt also noted to have rectal bleeding week prior to presentation requiring transfusion 5 days ago in ED as per daughter. Daughter also reports brownish discharge from G-J tube. In ED, pt afebrile, fiO2 96-98% on 40% on ventilator.  Chest X-ray w/ opacification of right lung concerning for possible PNA.  Admitted to RCU for further management. Sputum cxs grew PSA; treated with course of Cefepime. Patient with decreased H+H received 1 unit of PRBCS on 10/10.  10/14 EGD w/ Dr. Rosales for PEGJ exchange and clippings placed for closure of fistula site.  Persistent fevers treated with meropenem and vanc (d/c'd 10/20).  CT A/P without infectious source.  Continued fevers and persistent leukocytosis 11/1 CDiff positive - po vanco started, IV flagyl added (11/3-11/8). Head CT performed on 11/6 due to concern of lethargy no acute intracranial findings were noted; likely related to metabolic encephalopathy. Patient continued to have persistent fevers and was empirically treated with Cefepime (11/5-11/9) for CR PSA in sputum cx. CT C/A/P performed 11/6 c/w consolidative opacity LLL. Evening of 11/10-11/11, WBC 15 --> 21, procal 1 --> 43, lactate 3.2 -- pt also febrile. Infectious w/u sent 11/10 -- BCx negative, trach aspirate cx pending, UA negative. CT CAP on 11/11 showed left greater than right basilar consolidation and patchy groundglass opacity in both lungs consistent with atelectasis and/or pneumonia. Feeding tube coils in the stomach with tip within the first portion of the duodenum.. No bowel obstruction. Asked Dr. Rosales to adjust J tube. Per ID -- restart cefepime 11/11 - ).    11/13: S/p Peg revision/ adjustment w/ Dr Rosales. Awaiting XR tube Check prior to restarting tube feeds, must confirm placement prior to use. Added Volera and Hypersal to airway clearance measures. Monitor off rectal tube as without diarrhea.

## 2024-11-13 NOTE — PROGRESS NOTE ADULT - PROBLEM SELECTOR PLAN 5
- Patient on Lantus and Humalog at home    - dc TF due to J tube issue and possible aspiration  - dc Admelog (11/11)  - Lantus decreased 10 --> 5 by endo (11/12)  - started D5 @ 50 (11/12)  - c/w ISS  - Endocrine following

## 2024-11-13 NOTE — PROGRESS NOTE ADULT - NS ATTEND AMEND GEN_ALL_CORE FT
75 yo F w/ PMH DM2 (insulin-dependent), HTN, HLD, hypothyroidism, RA on Prednisone, fibromyalgia, cerebral aneurysm s/p repair, chronic hypoxemia and hypercapnic respiratory failure s/p trach, vent-dependent, recurrent C diff infection, oropharyngeal dysphagia s/p G-J tube with persistent GJ tube leaks now s/p GC fistula repair 8/12, and recent presentation 5 days PTA for rectal bleeding requiring transfusion in the ED who now presents with acute hypoxemic respiratory distress 2/2 RML PNA, admitted to the RCU for further management. Found to have Pseudomonas aeruginosa in sputum culture and urine culture with ESBL E. coli s/p course of meropenem. Course complicated by antibiotic-associated diarrhea.  Now with increased TFs output from PEG and diarrhea, concern for aspiration d/t copious output (noted fluid in stomach on bedside US).        - C/W current pain regimen, well controlled. C/W escitalopram and seroquel  - pt remains lethargic  but episodically back to baseline, likely septic encephalopathy  - CTH w/o acute changes  - continue lung protective ventilation. Does poorly on PS trials. Continue Duonebs.   - cont PO Vanc for Cdiff, ID favored continuing Cefepime for PNA d/t concern for higher rate of resistance with Ceftazidime (prior cx's w/ PsA sputum and esbl ecoli uti), continue through 11/15/24  - MRSA screen negative, Procal and WBC improving  - monitor VBG/lactate given HCO3 15, has NAGMA, d/t diarrhea?  - CT Abd/Pelvis showing PEJ in proximal duodenum, Dr. Vázquez (GI) to take to endoscopy today for repositioning; resume TFs when appropriate per GI, start with trickle TFs to prevent aspiration  - continue Erythromycin to help with gut motility; PEG venting  - new rectal tube placed 11/12 and then fell out again overnight  - Anemia multifactorial, hemorrhoids, AOCD. s/p EGD without active bleeding, transfuse for Hb < 7  - cont to monitor FS  , TFs on hold awtg endoscopy, continue D5 gtt and FS q4h - continue Lantus 10, hold Admelog  -  RA, on home prednisone 5 mg daily   - monitor hypoNa, tsh normal   - cont wound care to sacrum for decub (also likely moisture related rash areas on back)   - DVT ppx- only scd for now given significant anemia a few days ago

## 2024-11-13 NOTE — PRE PROCEDURE NOTE - PRE PROCEDURE EVALUATION
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      H/O tracheostomy      PEG (percutaneous endoscopic gastrostomy) status        ALLERGIES:  walnut (Unknown)  metronidazole (Rash)  Lyrica (Unknown)  penicillin (Unknown)  Pineapple (Unknown)  Tagamet (Unknown)  heparin (Unknown)  Pecans (Unknown)  Hazelnut (Unknown)  meropenem (Rash)    HOME MEDICATIONS:  acetaminophen 650 mg/25 mL oral liquid: 25 milliliter(s) by PEG tube every 6 hours  Biotene Dry Mouth oral solution: 15 milliliter(s) orally 2 times a day  diphenhydrAMINE 12.5 mg/5 mL oral liquid: 10 milliliter(s) by gastrostomy tube once a day as needed for Itching  Enbrel Prefilled Syringe 50 mg/mL subcutaneous solution: 50 milligram(s) subcutaneously once a week  fentaNYL 25 mcg/hr transdermal film, extended release: 1 patch transdermally every 72 hours MDD: 1 patch per 72 hours  gabapentin 250 mg/5 mL oral solution: 2.5 milliliter(s) by PEG tube 2 times a day  HumaLOG KwikPen 100 units/mL injectable solution: 14 unit(s) subcutaneous 3 times a day  insulin lispro 100 units/mL injectable solution: injectable every 6 hours 2 Unit(s) if Glucose 151 - 200  4 Unit(s) if Glucose 201 - 250  6 Unit(s) if Glucose 251 - 300  8 Unit(s) if Glucose 301 - 350  10 Unit(s) if Glucose 351 - 400  12 Unit(s) if Glucose Greater Than 400  lactobacillus acidophilus oral capsule: 2 cap(s) by PEG tube once a day  Lantus Solostar Pen 100 units/mL subcutaneous solution: 28 unit(s) subcutaneous once a day (at bedtime)  lidocaine 4% topical film: Apply topically to affected area once a day 2 to shoulders and 2 to knees  Mucomyst-10 inhalation solution: 2 milliliter(s) by nebulizer 2 times a day  pantoprazole 40 mg oral granule, delayed release: 1 each orally once a day  sodium chloride 1000 mg oral tablet, soluble: 1 tab(s) by PEG tube once a day  sodium chloride nasal: 1 spray(s) in each nostril 4 times a day  Tylenol 500 mg oral tablet: 1 tab(s) by jejunostomy tube every 6 hours    AICD/PPM: [ ] yes   [x ] no    PERTINENT LAB DATA:                        8.2    15.15 )-----------( 183      ( 13 Nov 2024 06:39 )             27.2     11-13    135  |  107  |  21  ----------------------------<  133[H]  4.5   |  15[L]  |  <0.30[L]    Ca    8.4      13 Nov 2024 06:38  Phos  3.8     11-13  Mg     1.8     11-13    TPro  6.0  /  Alb  2.0[L]  /  TBili  1.1  /  DBili  x   /  AST  22  /  ALT  10  /  AlkPhos  103  11-13                PHYSICAL EXAMINATION:    Height (cm): 149.9  Weight (kg): 54.4  BMI (kg/m2): 24.2  BSA (m2): 1.48T(C): 38.2  HR: 98  BP: 145/67  RR: 18  SpO2: 100%    Constitutional: NAD    Neck:  No JVD  Respiratory: trach on vent   Cardiovascular: RRR  Neurological: A/O x 0        COMMENTS:    The patient is a suitable candidate for the planned procedure unless box checked [ ]  No, explain:    
Attending Physician:     Anil Rosales MD                       Procedure:  EGD to replace GJ tube    Indication for Procedure: Replace GJ tube.  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      H/O tracheostomy      PEG (percutaneous endoscopic gastrostomy) status        ALLERGIES:  walnut (Unknown)  metronidazole (Rash)  Lyrica (Unknown)  penicillin (Unknown)  Pineapple (Unknown)  Tagamet (Unknown)  heparin (Unknown)  Pecans (Unknown)  Hazelnut (Unknown)  meropenem (Rash)    HOME MEDICATIONS:  acetaminophen 650 mg/25 mL oral liquid: 25 milliliter(s) by PEG tube every 6 hours  Biotene Dry Mouth oral solution: 15 milliliter(s) orally 2 times a day  diphenhydrAMINE 12.5 mg/5 mL oral liquid: 10 milliliter(s) by gastrostomy tube once a day as needed for Itching  Enbrel Prefilled Syringe 50 mg/mL subcutaneous solution: 50 milligram(s) subcutaneously once a week  fentaNYL 25 mcg/hr transdermal film, extended release: 1 patch transdermally every 72 hours MDD: 1 patch per 72 hours  gabapentin 250 mg/5 mL oral solution: 2.5 milliliter(s) by PEG tube 2 times a day  HumaLOG KwikPen 100 units/mL injectable solution: 14 unit(s) subcutaneous 3 times a day  insulin lispro 100 units/mL injectable solution: injectable every 6 hours 2 Unit(s) if Glucose 151 - 200  4 Unit(s) if Glucose 201 - 250  6 Unit(s) if Glucose 251 - 300  8 Unit(s) if Glucose 301 - 350  10 Unit(s) if Glucose 351 - 400  12 Unit(s) if Glucose Greater Than 400  lactobacillus acidophilus oral capsule: 2 cap(s) by PEG tube once a day  Lantus Solostar Pen 100 units/mL subcutaneous solution: 28 unit(s) subcutaneous once a day (at bedtime)  lidocaine 4% topical film: Apply topically to affected area once a day 2 to shoulders and 2 to knees  Mucomyst-10 inhalation solution: 2 milliliter(s) by nebulizer 2 times a day  pantoprazole 40 mg oral granule, delayed release: 1 each orally once a day  sodium chloride 1000 mg oral tablet, soluble: 1 tab(s) by PEG tube once a day  sodium chloride nasal: 1 spray(s) in each nostril 4 times a day  Tylenol 500 mg oral tablet: 1 tab(s) by jejunostomy tube every 6 hours    AICD/PPM: [ ] yes   [  ] no    PERTINENT LAB DATA:                        7.8    4.44  )-----------( 111      ( 21 Oct 2024 06:43 )             26.6     10-21    139  |  103  |  10  ----------------------------<  166[H]  3.2[L]   |  21[L]  |  <0.30[L]    Ca    8.3[L]      21 Oct 2024 06:43  Phos  3.4     10-21  Mg     1.6     10-21                  PHYSICAL EXAMINATION:    T(C): 36.7  HR: 97  BP: 206/92  RR: 15  SpO2: 107%    Constitutional: NAD  HEENT: PERRLA, EOMI,    Neck:  Tracheostomy  Respiratory: CTAB/L  Cardiovascular: S1 and S2  Gastrointestinal: BS+, soft, NT/ND  Extremities: No peripheral edema  Neurological: A/O x 3, no focal deficits  Psychiatric: Normal mood, normal affect  Skin: No rashes    ASA Class: I [ ]  II [ ]  III [ x ]  IV [ ]    COMMENTS:    The patient is a suitable candidate for the planned procedure unless box checked [ ]  No, explain:    
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      H/O tracheostomy      PEG (percutaneous endoscopic gastrostomy) status        ALLERGIES:  walnut (Unknown)  metronidazole (Rash)  Lyrica (Unknown)  penicillin (Unknown)  Pineapple (Unknown)  Tagamet (Unknown)  heparin (Unknown)  Pecans (Unknown)  Hazelnut (Unknown)  meropenem (Rash)    HOME MEDICATIONS:  acetaminophen 650 mg/25 mL oral liquid: 25 milliliter(s) by PEG tube every 6 hours  Biotene Dry Mouth oral solution: 15 milliliter(s) orally 2 times a day  diphenhydrAMINE 12.5 mg/5 mL oral liquid: 10 milliliter(s) by gastrostomy tube once a day as needed for Itching  Enbrel Prefilled Syringe 50 mg/mL subcutaneous solution: 50 milligram(s) subcutaneously once a week  gabapentin 250 mg/5 mL oral solution: 2.5 milliliter(s) by PEG tube 2 times a day  HumaLOG KwikPen 100 units/mL injectable solution: 14 unit(s) subcutaneous 3 times a day  insulin lispro 100 units/mL injectable solution: injectable every 6 hours 2 Unit(s) if Glucose 151 - 200  4 Unit(s) if Glucose 201 - 250  6 Unit(s) if Glucose 251 - 300  8 Unit(s) if Glucose 301 - 350  10 Unit(s) if Glucose 351 - 400  12 Unit(s) if Glucose Greater Than 400  Kaopectate 262 mg/15 mL oral suspension: 30 milliliter(s) by PEG tube 2 times a day as needed for  diarrhea  lactobacillus acidophilus oral capsule: 2 cap(s) by PEG tube once a day  Lantus Solostar Pen 100 units/mL subcutaneous solution: 28 unit(s) subcutaneous once a day (at bedtime)  lidocaine 4% topical film: Apply topically to affected area once a day 2 to shoulders and 2 to knees  Mucomyst-10 inhalation solution: 2 milliliter(s) by nebulizer 2 times a day  pantoprazole 4 mg/mL oral suspension: 10 milliliter(s) by PEG tube 2 times a day  sodium chloride 1000 mg oral tablet, soluble: 1 tab(s) by PEG tube once a day  sodium chloride nasal: 1 spray(s) in each nostril 4 times a day  Tylenol 500 mg oral tablet: 1 tab(s) by jejunostomy tube every 6 hours    AICD/PPM: [ ] yes   [ ] no    PERTINENT LAB DATA:                        8.5    9.05  )-----------( 130      ( 14 Oct 2024 05:08 )             29.2     10-14    138  |  104  |  10  ----------------------------<  204[H]  4.6   |  25  |  <0.30[L]    Ca    7.7[L]      14 Oct 2024 05:08  Phos  3.5     10-14  Mg     2.2     10-14                  PHYSICAL EXAMINATION:    Height (cm): 149.9  Weight (kg): 54.4  BMI (kg/m2): 24.2  BSA (m2): 1.48T(C): 37.2  HR: 102  BP: 114/56  RR: 18  SpO2: 100%    Constitutional: NAD    Neck:  No JVD  Respiratory: trach #6 portex cuffed, full vent support   Cardiovascular: RRR            COMMENTS:    The patient is a suitable candidate for the planned procedure unless box checked [ ]  No, explain:

## 2024-11-13 NOTE — PROGRESS NOTE ADULT - ASSESSMENT
75 yo woman PMH T2DM on insulin, HTN, HLD, hypothyroidism, RA on Prednisone, Fibromyalgia, cerebral aneurysm s/p repair, chronic hypercapnic respiratory failure s/p trach (vent dependent) and chronic PEG 2022, recurrent C. diff infection , persistent GJ tube leaks now s/p GC fistula repair 8/12  admitted 10/7/24 with resp distress and found to have RML opacity     Antibiotics  Ceftriaxone 10/7  Azithro 10/7  Cefepime 10/7--> 10/12  meropenem 10/15 --> 10/23  IV Vanco 10/17 --> 10/21  Vanco via NGT 10/24; 11/1-->  Metronidazole 11/3 --> 11/7  Cefepime 11/5 --> 11/10; 11/11-->  Erythromycin 11/11-->      10/10 melena, anemia, blood tx  10/14 EGD for GJ exchange and piror PEG site closure  One benign-appearing, intrinsic moderate stenosis was found in the upper third of the        esophagus. The stenosis was traversed.       The cardia and gastric fundus were normal.       A gastric tube with tip in the jejunum was found in the gastric body. The PEG required        removal because it was leaking. The J tube extension (12F) was removed first. An externally        removable 24 Fr EndoVive Safety gastrostomy tube was lubricated. The guide wire was passed        through the existing G-tube port and snared endoscopically. The endoscope and snare were then        removed, pulling the wire out through the mouth. The g-tube was tied to the guidewire, pulled        through the mouth into the stomach and then pulled out from the stomach through the skin. The        bumper was attached to the gastrostomy tube. The feeding tube was then cut to an appropriate        length. The final position of the gastrostomy tube was confirmed by relook endoscopy, and        skin marking noted to be 3 cm at the external bumper. The final tension and compression of        the abdominal wall by the PEG tube and external bumper were checked and revealed that the        bumper was moderately tight and mildly deforming the skin. The J tube extension (12 F        EndoVive Jejunal feeding tube) was advanced into the stomach. The tip of the J tube had a        loop thread that was captured with a Resolution clip. The thread and the J tube extension        were advanced to the proximal jejunum, and the endoclip along with the tip of the J tube was        attached to the wall securely. The J tube extension was capped, and the tube site was cleaned        and dressed.       A small fistula was found on the anterior wall of the gastric body related to prior G tube        site. Coagulation of tissue near the fistula using argon plasma at 1.2 liters/minute and 35        peraza was successful. To closure the gastrocutaneous fistula, four hemostatic clips were        successfully placed (MR conditional, two 16 mm DuraClip and 2 Mantis clips.       The examined duodenum was normal.    10/14, 10/15 Fever, transient hypoxia post procedure  10/15 ProCalcitonin = 8.48 suggestive of bacterial process; BCx x2 NGTD; Trach: Pseudomonas   - though benign mg stain  10;16 Leukocytosis WBC = 14.26  10/17 fever, hypotension, abd distension, no diarrhea noted this am WBC = 15.02; Rising Procalcitonin = 38.49; Obstructed J tube   10/18  lost IV access re-gained  - CT scan late this afternoon  WBC = 8.68; Rising Procalcitonin >100  10/18 imaging suggests multifocal pneumonia  10/21 improved chest exam, Procalcitonin level considerably decreased, decreased FiO2  - afebrile since 10/18  10/21 UGI endoscopy done  Findings:       The esophagus was normal. A fistula was found on the anterior wall of the gastric body.       There was evidence of a gastrostomy present in the gastric body. The patient was placed in        the supine position. The endoscope was advanced to the jejunum and the prior placed J tube        with loop attached to the wall and the loop thread was cut by endoclip. The J tube and the G        tube were removed. The gastrostomy fistula was utilized and an Avanos 22 F        Gastrostomy/Jejunal tube was advanced. The jejunal tip was advanced through the pylorus and        into the duodenum. The endoscope was then advanced to the duodenum and the loop thread at the        tip of the J tube was captured and advanced to the proximal jejunum. The loop thread was        clipped to wall by a [employer] Resolution clip. The J tube flushed easily and the G        tube decompress the abdomen easily. The internal balloon was insufflated with 10 cc of water        to hold the GJ tube in place. The outside marking was at 3.  10/23  no distress, liquid stools noted  - Meropenem discontinued  GI PCR NEG  10/24  persistent diarrhea  Cdiff NEG; enteral vanco stopped and Immodium provided  10/28 low grade temps, mild leukocytosis  10/30 blood transfusion  11/1  fever  +Cdiff  11/3 continued fever  - +BC Stph epi most likely procurement contaminant,  modest diarrhea reported  - daughter described increased diarrhea in evenings - Pseudomonas airway colonization is long term, no suggestion of pneumonia at present, sacral decub remains superifical will granulated without signs of infection  11/4  - fever, abnormal chest exam though FIO2 still 30%  rectal tube inserted for increased diarrhea  11/5 lethargy, fever, leukocytosis, increased lactate, hyponatremia  11/6 sputum gram stain suggests persistent Pseudomonas colonization  - continued fever, leukocytosis  11/7 unclear if LLL consolidation represents infection. Repeat Procalcitonin  11/7= 2.92 decreased  11/8 continued fevers  +diarrhea, lethargy continues  to complete Cefepime for LLL pneumonia v atelectasis tomorrow 11/9 11/11 increased fever with increased WBC and increased Procalcitonin off Cefepime noted, which was resumed  Concern if feed is refluxing back to the stomach versus J tube now in the stomach.     imaging show G tube in 1st part of duodenum. there are bibasilar L>R consolidations - likely continual reaspiration  11/12 persistent leukocytosis  11/13, decreased height of fever, decreased leukocytosis, Procalcitonin decreased to 52% of the 11/11 value suggesting good therapeutic response. For endoscopic replacement of feeding tube into jejunum  - Rectal tube came out    Issues  recurrent Cdiff  cerebral aneurysm s/p repair  chronic hypercapnic respiratory failure s/p trach (vent dependent) and chronic PEG 2022  anemia  - had iron deficiency  Pseudomonas aeruginosa upper airway colonization  (most recent isolate Resist to Cipro/Levo)  T2DM on insulin  10.14.24 -A1C: 5.8%  HTN  HLD  hypothyroidism  RA on Prednisone  Fibromyalgia  debility  sacral ulcer largely healed  malnourished/chronic catabolic state  aspiration      Suggest  Continue vanco 125 q 6 hours via J tube 11/1 -->  Continue Cefepime     spoke with daughter by phone 11/12 evening  discussed with RCU team today

## 2024-11-14 NOTE — PROGRESS NOTE ADULT - ASSESSMENT
73 yo F PMH T2DM on insulin, HTN, HLD, hypothyroidism, RA on Prednisone, Fibromyalgia, cerebral aneurysm s/p repair, chronic hypercapnic respiratory failure s/p trach (vent dependent) and chronic PEG 2022, recurrent C. diff infection (follows with Dr. Newman), persistent GJ tube leaks now s/p GC fistula repair 8/12 BIBEMS on 10/7 with daughter for respiratory distress, hypoxia to 88%.  Pt also noted to have rectal bleeding week prior to presentation requiring transfusion 5 days ago in ED as per daughter. Daughter also reports brownish discharge from G-J tube. In ED, pt afebrile, fiO2 96-98% on 40% on ventilator.  Chest X-ray w/ opacification of right lung concerning for possible PNA.  Admitted to RCU for further management. Sputum cxs grew PSA; treated with course of Cefepime. Patient with decreased H+H received 1 unit of PRBCS on 10/10.  10/14 EGD w/ Dr. Rosales for PEGJ exchange and clippings placed for closure of fistula site.  Persistent fevers treated with meropenem and vanc (d/c'd 10/20).  CT A/P without infectious source.  Continued fevers and persistent leukocytosis 11/1 CDiff positive - po vanco started, IV flagyl added (11/3-11/8). Head CT performed on 11/6 due to concern of lethargy no acute intracranial findings were noted; likely related to metabolic encephalopathy. Patient continued to have persistent fevers and was empirically treated with Cefepime (11/5-11/9) for CR PSA in sputum cx. CT C/A/P performed 11/6 c/w consolidative opacity LLL. Evening of 11/10-11/11, WBC 15 --> 21, procal 1 --> 43, lactate 3.2 -- pt also febrile. Infectious w/u sent 11/10 -- BCx negative, trach aspirate cx pending, UA negative. CT CAP on 11/11 showed left greater than right basilar consolidation and patchy ground glass opacity in both lungs consistent with atelectasis and/or pneumonia; Feeding tube coils in the stomach with tip within the first portion of the duodenum, No bowel obstruction. Cefepime restarted 11/11-11/15 with improvement in leukocytosis. Patient underwent Endoscopic adjustment of peg 11/13 with Dr. Rosales. XR tube check pending prior to resuming tube feeds.     11/13: S/p Peg revision/ adjustment w/ Dr Rosales. Awaiting XR tube Check prior to restarting tube feeds, must confirm placement prior to use. Added Volera and Hypersal to airway clearance measures. Monitor off rectal tube as without diarrhea.  75 yo F PMH T2DM on insulin, HTN, HLD, hypothyroidism, RA on Prednisone, Fibromyalgia, cerebral aneurysm s/p repair, chronic hypercapnic respiratory failure s/p trach (vent dependent) and chronic PEG 2022, recurrent C. diff infection (follows with Dr. Newman), persistent GJ tube leaks now s/p GC fistula repair 8/12 BIBEMS on 10/7 with daughter for respiratory distress, hypoxia to 88%.  Pt also noted to have rectal bleeding week prior to presentation requiring transfusion 5 days ago in ED as per daughter. Daughter also reports brownish discharge from G-J tube. In ED, pt afebrile, fiO2 96-98% on 40% on ventilator.  Chest X-ray w/ opacification of right lung concerning for possible PNA.  Admitted to RCU for further management. Sputum cxs grew PSA; treated with course of Cefepime. Patient with decreased H+H received 1 unit of PRBCS on 10/10.  10/14 EGD w/ Dr. Rosales for PEGJ exchange and clippings placed for closure of fistula site.  Persistent fevers treated with meropenem and vanc (d/c'd 10/20).  CT A/P without infectious source.  Continued fevers and persistent leukocytosis 11/1 CDiff positive - po vanco started, IV flagyl added (11/3-11/8). Head CT performed on 11/6 due to concern of lethargy no acute intracranial findings were noted; likely related to metabolic encephalopathy. Patient continued to have persistent fevers and was empirically treated with Cefepime (11/5-11/9) for CR PSA in sputum cx. CT C/A/P performed 11/6 c/w consolidative opacity LLL. Evening of 11/10-11/11, WBC 15 --> 21, procal 1 --> 43, lactate 3.2 -- pt also febrile. Infectious w/u sent 11/10 -- BCx negative, trach aspirate cx pending, UA negative. CT CAP on 11/11 showed left greater than right basilar consolidation and patchy ground glass opacity in both lungs consistent with atelectasis and/or pneumonia; Feeding tube coils in the stomach with tip within the first portion of the duodenum, No bowel obstruction. Cefepime restarted 11/11-11/15 with improvement in leukocytosis. Patient underwent Endoscopic adjustment of peg 11/13 with Dr. Rosales. XR tube check performed.     11/14: S/p Endoscopic Peg adjustment 11/13 w/ Dr Rosales. AXR performed, Dr Rosales will review finding. Tube Feeds resumed at 40cc/hr with no titration today after discussion w/ GI and Attending. Still w/ low grade fevers but fever curve improving, Leukocytosis continues to improve, will continue Cefepime thru 11/15 per ID. Not tolerating Volera for airway clearance (periods of Desaturation) therefore discontinued. Monitor off rectal tube as without diarrhea.

## 2024-11-14 NOTE — PROGRESS NOTE ADULT - NS ATTEND AMEND GEN_ALL_CORE FT
75 yo F w/ PMH DM2 (insulin-dependent), HTN, HLD, hypothyroidism, RA on Prednisone, fibromyalgia, cerebral aneurysm s/p repair, chronic hypoxemia and hypercapnic respiratory failure s/p trach, vent-dependent, recurrent C diff infection, oropharyngeal dysphagia s/p G-J tube with persistent GJ tube leaks now s/p GC fistula repair 8/12, and recent presentation 5 days PTA for rectal bleeding requiring transfusion in the ED who now presents with acute hypoxemic respiratory distress 2/2 RML PNA, admitted to the RCU for further management. Found to have Pseudomonas aeruginosa in sputum culture and urine culture with ESBL E. coli s/p course of meropenem. Course complicated by antibiotic-associated diarrhea.  Now with increased TFs output from PEG and diarrhea, concern for aspiration d/t copious output (noted fluid in stomach on bedside US).        - C/W current pain regimen, well controlled. C/W escitalopram and seroquel  - remains lethargic, minimally interactive  - continue lung protective ventilation. Does poorly on PS trials. Continue Duonebs.   - cont PO Vanc for Cdiff, ID favored continuing Cefepime for PNA d/t concern for higher rate of resistance with Ceftazidime (prior cx's w/ PsA sputum and esbl ecoli uti), continue through 11/15/24  - Duonebs + 3% q6h for ACT, not tolerating Volara, desaturating  - MRSA screen negative, Procal and WBC improving  - monitor VBG/lactate given HCO3 15, has NAGMA, d/t diarrhea?  - Dr. Vázquez (GI) repositioned PEJ endoscopically 11/13; TFs resumed today at 50% of rate to assess tolerance  - continue Erythromycin to help with gut motility; PEG venting  - new rectal tube placed 11/12 and then fell out again overnight  - Anemia multifactorial, hemorrhoids, AOCD. s/p EGD without active bleeding, transfuse for Hb < 7  - cont to monitor FS  , stop IVFs once TFs restarted - continue Lantus 10, will need to restart Admelog once clear tolerating feeds  -  RA, on home prednisone 5 mg daily   - monitor hypoNa, tsh normal   - cont wound care to sacrum for decub (also likely moisture related rash areas on back)   - podiatry for toenails per daughter request  - DVT ppx- only scd for now given significant anemia a few days ago

## 2024-11-14 NOTE — PROGRESS NOTE ADULT - ASSESSMENT
75 yo woman PMH T2DM on insulin, HTN, HLD, hypothyroidism, RA on Prednisone, Fibromyalgia, cerebral aneurysm s/p repair, chronic hypercapnic respiratory failure s/p trach (vent dependent) and chronic PEG 2022, recurrent C. diff infection , persistent GJ tube leaks now s/p GC fistula repair 8/12  admitted 10/7/24 with resp distress and found to have RML opacity     Antibiotics  Ceftriaxone 10/7  Azithro 10/7  Cefepime 10/7--> 10/12  meropenem 10/15 --> 10/23  IV Vanco 10/17 --> 10/21  Vanco via NGT 10/24; 11/1-->  Metronidazole 11/3 --> 11/7  Cefepime 11/5 --> 11/10; 11/11-->  Erythromycin 11/11-->      10/10 melena, anemia, blood tx  10/14 EGD for GJ exchange and piror PEG site closure  One benign-appearing, intrinsic moderate stenosis was found in the upper third of the        esophagus. The stenosis was traversed.       The cardia and gastric fundus were normal.       A gastric tube with tip in the jejunum was found in the gastric body. The PEG required        removal because it was leaking. The J tube extension (12F) was removed first. An externally        removable 24 Fr EndoVive Safety gastrostomy tube was lubricated. The guide wire was passed        through the existing G-tube port and snared endoscopically. The endoscope and snare were then        removed, pulling the wire out through the mouth. The g-tube was tied to the guidewire, pulled        through the mouth into the stomach and then pulled out from the stomach through the skin. The        bumper was attached to the gastrostomy tube. The feeding tube was then cut to an appropriate        length. The final position of the gastrostomy tube was confirmed by relook endoscopy, and        skin marking noted to be 3 cm at the external bumper. The final tension and compression of        the abdominal wall by the PEG tube and external bumper were checked and revealed that the        bumper was moderately tight and mildly deforming the skin. The J tube extension (12 F        EndoVive Jejunal feeding tube) was advanced into the stomach. The tip of the J tube had a        loop thread that was captured with a Resolution clip. The thread and the J tube extension        were advanced to the proximal jejunum, and the endoclip along with the tip of the J tube was        attached to the wall securely. The J tube extension was capped, and the tube site was cleaned        and dressed.       A small fistula was found on the anterior wall of the gastric body related to prior G tube        site. Coagulation of tissue near the fistula using argon plasma at 1.2 liters/minute and 35        peraza was successful. To closure the gastrocutaneous fistula, four hemostatic clips were        successfully placed (MR conditional, two 16 mm DuraClip and 2 Mantis clips.       The examined duodenum was normal.    10/14, 10/15 Fever, transient hypoxia post procedure  10/15 ProCalcitonin = 8.48 suggestive of bacterial process; BCx x2 NGTD; Trach: Pseudomonas   - though benign mg stain  10;16 Leukocytosis WBC = 14.26  10/17 fever, hypotension, abd distension, no diarrhea noted this am WBC = 15.02; Rising Procalcitonin = 38.49; Obstructed J tube   10/18  lost IV access re-gained  - CT scan late this afternoon  WBC = 8.68; Rising Procalcitonin >100  10/18 imaging suggests multifocal pneumonia  10/21 improved chest exam, Procalcitonin level considerably decreased, decreased FiO2  - afebrile since 10/18  10/21 UGI endoscopy done  Findings:       The esophagus was normal. A fistula was found on the anterior wall of the gastric body.       There was evidence of a gastrostomy present in the gastric body. The patient was placed in        the supine position. The endoscope was advanced to the jejunum and the prior placed J tube        with loop attached to the wall and the loop thread was cut by endoclip. The J tube and the G        tube were removed. The gastrostomy fistula was utilized and an Avanos 22 F        Gastrostomy/Jejunal tube was advanced. The jejunal tip was advanced through the pylorus and        into the duodenum. The endoscope was then advanced to the duodenum and the loop thread at the        tip of the J tube was captured and advanced to the proximal jejunum. The loop thread was        clipped to wall by a [employer] Resolution clip. The J tube flushed easily and the G        tube decompress the abdomen easily. The internal balloon was insufflated with 10 cc of water        to hold the GJ tube in place. The outside marking was at 3.  10/23  no distress, liquid stools noted  - Meropenem discontinued  GI PCR NEG  10/24  persistent diarrhea  Cdiff NEG; enteral vanco stopped and Immodium provided  10/28 low grade temps, mild leukocytosis  10/30 blood transfusion  11/1  fever  +Cdiff  11/3 continued fever  - +BC Stph epi most likely procurement contaminant,  modest diarrhea reported  - daughter described increased diarrhea in evenings - Pseudomonas airway colonization is long term, no suggestion of pneumonia at present, sacral decub remains superifical will granulated without signs of infection  11/4  - fever, abnormal chest exam though FIO2 still 30%  rectal tube inserted for increased diarrhea  11/5 lethargy, fever, leukocytosis, increased lactate, hyponatremia  11/6 sputum gram stain suggests persistent Pseudomonas colonization  - continued fever, leukocytosis  11/7 unclear if LLL consolidation represents infection. Repeat Procalcitonin  11/7= 2.92 decreased  11/8 continued fevers  +diarrhea, lethargy continues  to complete Cefepime for LLL pneumonia v atelectasis tomorrow 11/9 11/11 increased fever with increased WBC and increased Procalcitonin off Cefepime noted, which was resumed  Concern if feed is refluxing back to the stomach versus J tube now in the stomach.     imaging show G tube in 1st part of duodenum. there are bibasilar L>R consolidations - likely continual reaspiration  11/12 persistent leukocytosis  11/13, decreased height of fever, decreased leukocytosis, Procalcitonin decreased to 52% of the 11/11 value suggesting good therapeutic response. s/p endoscopic replacement of feeding tube into jejunum  - Rectal tube came out      Issues  recurrent Cdiff  cerebral aneurysm s/p repair  chronic hypercapnic respiratory failure s/p trach (vent dependent) and chronic PEG 2022  anemia  - had iron deficiency  Pseudomonas aeruginosa upper airway colonization  (most recent isolate Resist to Cipro/Levo)  T2DM on insulin  10.14.24 -A1C: 5.8%  HTN  HLD  hypothyroidism  RA on Prednisone  Fibromyalgia  debility  sacral ulcer largely healed  malnourished/chronic catabolic state  aspiration      Suggest  Continue vanco 125 q 6 hours via J tube 11/1 -->  Continue Cefepime     spoke with daughter by phone

## 2024-11-14 NOTE — PROGRESS NOTE ADULT - SUBJECTIVE AND OBJECTIVE BOX
[facility]-- WOUND TEAM -- FOLLOW UP NOTE  --------------------------------------------------------------------------------    24 hour events/subjective:    HHA at bedside   Daughter on telephone  Afebrile  Last temp 38.2 C  Tolerating TF w/o n/v  Chart reviewed  Hx of    75 yo F PMH T2DM on insulin, HTN, HLD, hypothyroidism, RA on Prednisone, Fibromyalgia, cerebral aneurysm s/p repair  chronic hypercapnic respiratory failure s/p trach (vent dependent) and chronic PEG 2022, recurrent C. diff infection  dc planning ongoing          Diet:  Diet, NPO with Tube Feed:   Tube Feeding Modality: Jejunostomy  Casie "[employer] Peptide 1.5 ([id number])  Total Volume for 24 Hours (mL): 960  Continuous  Until Goal Tube Feed Rate (mL per Hour): 40  Tube Feed Duration (in Hours): 24  Tube Feed Start Time: 06:00  Free Water Flush  Pump   Rate (mL per Hour): 50   Frequency: Every Hour  Free Water Flush Instructions:  50ml free water flushes Q1hr  Alfred(7 Gm Arginine/7 Gm Glut/1.2 Gm HMB     Qty per Day:  2  No Carb Prosource (1pkg = 15gms Protein)     Qty per Day:  1  Banatrol TF     Qty per Day:  1/2 packet per daily (11-14-24 @ 11:18)      ROS: pt unable to participate    ALLERGIES & MEDICATIONS  --------------------------------------------------------------------------------  Allergies    walnut (Unknown)  metronidazole (Rash)  Lyrica (Unknown)  penicillin (Unknown)  Pineapple (Unknown)  Tagamet (Unknown)  heparin (Unknown)  Pecans (Unknown)  Hazelnut (Unknown)  meropenem (Rash)    Intolerances          STANDING INPATIENT MEDICATIONS    albuterol/ipratropium for Nebulization 3 milliLiter(s) Nebulizer every 6 hours  artificial  tears Solution 1 Drop(s) Both EYES every 6 hours  ascorbic acid 500 milliGRAM(s) Oral daily  Biotene Dry Mouth Oral Rinse 5 milliLiter(s) Swish and Spit every 6 hours  calcium carbonate 1250 mG  + Vitamin D (OsCal 500 + D) 1 Tablet(s) Oral <User Schedule>  cefepime   IVPB 2000 milliGRAM(s) IV Intermittent every 8 hours  chlorhexidine 0.12% Liquid 15 milliLiter(s) Oral Mucosa every 12 hours  chlorhexidine 4% Liquid 1 Application(s) Topical daily  dextrose 5%. 1000 milliLiter(s) IV Continuous <Continuous>  dextrose 5%. 1000 milliLiter(s) IV Continuous <Continuous>  dextrose 5%. 1000 milliLiter(s) IV Continuous <Continuous>  dextrose 5%. 1000 milliLiter(s) IV Continuous <Continuous>  dextrose 50% Injectable 25 Gram(s) IV Push once  dextrose 50% Injectable 25 Gram(s) IV Push once  dextrose 50% Injectable 12.5 Gram(s) IV Push once  diclofenac sodium 1% Gel 2 Gram(s) Topical every 6 hours  erythromycin   IVPB 160 milliGRAM(s) IV Intermittent every 8 hours  escitalopram 10 milliGRAM(s) Oral <User Schedule>  fentaNYL   Patch  12 MICROgram(s)/Hr 1 Patch Transdermal every 72 hours  fentaNYL   Patch  25 MICROgram(s)/Hr 1 Patch Transdermal every 72 hours  gabapentin Solution 250 milliGRAM(s) Oral <User Schedule>  glucagon  Injectable 1 milliGRAM(s) IntraMuscular once  glucagon  Injectable 1 milliGRAM(s) IntraMuscular once  hemorrhoidal Ointment 1 Application(s) Rectal at bedtime  influenza  Vaccine (HIGH DOSE) 0.5 milliLiter(s) IntraMuscular once  insulin glargine Injectable (LANTUS) 5 Unit(s) SubCutaneous <User Schedule>  insulin lispro (ADMELOG) corrective regimen sliding scale   SubCutaneous every 6 hours  lactobacillus acidophilus 1 Tablet(s) Oral <User Schedule>  levothyroxine 125 MICROGram(s) Oral <User Schedule>  lidocaine   4% Patch 4 Patch Transdermal every 24 hours  melatonin 12 milliGRAM(s) Oral <User Schedule>  nystatin Powder 1 Application(s) Topical every 8 hours  pantoprazole  Injectable 40 milliGRAM(s) IV Push every 12 hours  predniSONE  Solution 5 milliGRAM(s) Enteral Tube <User Schedule>  QUEtiapine 12.5 milliGRAM(s) Oral <User Schedule>  sodium chloride 1 Gram(s) Oral every 8 hours  sodium chloride 0.65% Nasal 1 Spray(s) Both Nostrils every 6 hours  sodium chloride 0.9%. 500 milliLiter(s) IV Continuous <Continuous>  sodium chloride 3%  Inhalation 4 milliLiter(s) Inhalation every 6 hours  vancomycin    Solution 125 milliGRAM(s) Oral every 6 hours  witch hazel Pads 1 Application(s) Topical every 12 hours      PRN INPATIENT MEDICATION  acetaminophen   Oral Liquid .. 650 milliGRAM(s) Oral every 6 hours PRN  aluminum hydroxide/magnesium hydroxide/simethicone Suspension 30 milliLiter(s) Enteral Tube every 4 hours PRN  oxyCODONE    Solution 5 milliGRAM(s) Oral every 6 hours PRN  oxyCODONE    Solution 10 milliGRAM(s) Oral every 6 hours PRN  oxyCODONE    Solution 2.5 milliGRAM(s) Oral every 6 hours PRN        VITALS/PHYSICAL EXAM  --------------------------------------------------------------------------------  T(C): 38.2 (11-14-24 @ 12:32), Max: 38.2 (11-13-24 @ 13:45)  HR: 116 (11-14-24 @ 12:32) (72 - 117)  BP: 151/86 (11-14-24 @ 12:32) (103/54 - 167/66)  RR: 18 (11-14-24 @ 12:32) (16 - 24)  SpO2: 100% (11-14-24 @ 12:32) (97% - 100%)  Wt(kg): --  Height (cm): 149.9 (11-13-24 @ 13:27)  Weight (kg): 54.4 (11-13-24 @ 13:27)  BMI (kg/m2): 24.2 (11-13-24 @ 13:27)  BSA (m2): 1.48 (11-13-24 @ 13:27)      11-13-24 @ 07:01  -  11-14-24 @ 07:00  --------------------------------------------------------  IN: 1200 mL / OUT: 400 mL / NET: 800 mL    HEENT: NC/AT, sclera clear, mucosa moist, throat clear, trachea midline, neck supple  Respiratory: Trach  Gastrointestinal: soft NT/ND. (+) fistula moderate drainage  : (+) purewick  Neurology:  nonverbal, can not follow commands  Psych:  difficult to assess  Musculoskeletal:  limited stiff / (+) foot drop  Vascular: BLLE equally warm,  no cyanosis, clubbing, edema nor acute ischemia     BLLE DP pulses palpable    Rt hallux area of darkened tissue discoloration 0.5cm x 0.5cm x 0.0cm     Lt hallux medial area of darkened tissue discoloration 1.0cm x 1.0cm x 0.0cm     Lt 5th toe 0.3cm x 0.3cm x 0.0cm         mild erythema, no increased warmth, no induration, nor fluctuance  Skin:  moist w/ good turgor  Skin:  Sacral/bilateral buttocks with contracted, scarred tissue in and around the gluteal cleft superficial denuded skin         Left ischium with contracted, scarred tissue very close to the anus,          No odor, mild erythema, increased warmth, tenderness, induration, fluctuance        LABS/ CULTURES/ RADIOLOGY:                  8.6    11.48 >-----------<  152      [11-14-24 @ 06:39]              29.8     133  |  105  |  16  ----------------------------<  114      [11-14-24 @ 06:39]  3.9   |  14  |  <0.30        Ca     8.6     [11-14-24 @ 06:39]      Mg     1.5     [11-14-24 @ 06:39]      Phos  3.6     [11-14-24 @ 06:39]    TPro  6.3  /  Alb  2.1  /  TBili  1.1  /  DBili  x   /  AST  22  /  ALT  13  /  AlkPhos  96  [11-14-24 @ 06:39]          Blood Gas Calcium, Ionized - Venous: 1.33 mmol/L (11-13-24 @ 13:35)  Blood Gas Calcium, Ionized - Venous: 1.31 mmol/L (11-13-24 @ 06:39)      CAPILLARY BLOOD GLUCOSE      POCT Blood Glucose.: 108 mg/dL (14 Nov 2024 12:21)  POCT Blood Glucose.: 115 mg/dL (14 Nov 2024 05:50)  POCT Blood Glucose.: 142 mg/dL (13 Nov 2024 23:28)  POCT Blood Glucose.: 191 mg/dL (13 Nov 2024 17:48)                  Culture - Blood (collected 11-10-24 @ 11:20)  Source: .Blood BLOOD  Preliminary Report (11-13-24 @ 15:01):    No growth at 72 Hours    Culture - Blood (collected 11-10-24 @ 11:20)  Source: .Blood BLOOD  Preliminary Report (11-13-24 @ 15:01):    No growth at 72 Hours          A1C with Estimated Average Glucose Result: 5.8 % (10-14-24 @ 05:08)  A1C with Estimated Average Glucose Result: 6.4 % (08-12-24 @ 07:32)

## 2024-11-14 NOTE — PROGRESS NOTE ADULT - ASSESSMENT
Assessment and Plan:   · Assessment      Assessment and Plan:   · Assessment    A/P:Hx of 75 yo F PMH T2DM on insulin, HTN, HLD, hypothyroidism, RA on Prednisone, Fibromyalgia, cerebral aneurysm s/p repair, acute hypercapnic respiratory failure s/p trach (vent dependent) and PEG (10/22), bedbound presented from home with complaints of possible G tube dislodgement and redness around the site.  Site is red with small amount of pus at insertion site.  Tender to palpation, warm to touch. CT Abdomen/Pelvis done showing dislodgement of G tube with balloon in the anterior abdominal wall with air filled track to stomach.  GI contacted, but unable to replace at bedside given placement of balloon. Surgery consulted--recommend gastrograffin study to eval placement of PEG and possible IR replacement in AM. Given Vancomycin 1 gm IV x 1 due to concern for possible cellulitis.  Of note, patient hemodynamically stable, afebrile, without leukocytosis on presentation.     Wound Consult requested to assist w/ management of  Sacral/bilateral Buttocks chronic stage 4 pressure injury now resolved with some denuded areas of moisture  Left ischium chronic stage 4 pressure injury now resolved  Incontinence Dermatitis  BL hallux and Lt 5th toe DTPI    Recommendations:     -   Buttocks/ Sacrum / RADHA BID /cavilon and prn soiling   -   DTPI cavilon daily offload with CAIR boots  -   Continue w/ attends under pads and Pericare as per protocol  -   Continue w/ low air loss pressure redistribution bed surface   -   Continue turning and positioning w/ offloading assistive devices as per protocol  Moisturize intact skin w/ SWEEN cream BID  Nutrition Consult for optimization in pt w/ Increased nutritional needs  Pt will need Group 2 mattress on hospital bed and ROHO cushion for wheel chair upon discharge home  Care as per medicine, will follow w/ you  Upon discharge f/u as outpatient at [facility] [address] [facility] [phone]  Daughter, JAYLAN, educated through teachback method care of patients skin and prevention of further injury  Seen &  D/w team & RN  Thank you for this consult  Gabby MCINTYRE-BC, [facility]   [phone]  Nights/ Weekends/ Holidays please call:  General Surgery Consult pager ([phone]) for emergencies  Wound PT for multilayer leg wrapping or VAC issues (x [phone])

## 2024-11-14 NOTE — PROGRESS NOTE ADULT - SUBJECTIVE AND OBJECTIVE BOX
Patient is a 74y old  Female who presents with a chief complaint of Patient admitted with Hypoxemia and episode of Bright red blood per rectum (13 Nov 2024 17:30)      Interval Events:    REVIEW OF SYSTEMS:  [ ] Positive  [ ] All other systems negative  [ ] Unable to assess ROS because ________    Vital Signs Last 24 Hrs  T(C): 36.8 (11-14-24 @ 04:51), Max: 38.2 (11-13-24 @ 13:07)  T(F): 98.2 (11-14-24 @ 04:51), Max: 100.7 (11-13-24 @ 13:07)  HR: 82 (11-14-24 @ 06:08) (72 - 111)  BP: 167/66 (11-14-24 @ 04:51) (103/54 - 167/66)  RR: 20 (11-14-24 @ 04:51) (16 - 24)  SpO2: 100% (11-14-24 @ 06:08) (100% - 100%)PHYSICAL EXAM:  HEENT:   [ ]Tracheostomy:  [ ]Pupils equal  [ ]No oral lesions  [ ]Abnormal        SKIN  [ ]No Rash  [ ] Abnormal  [ ] pressure    CARDIAC  [ ]Regular  [ ]Abnormal    PULMONARY  [ ]Bilateral Clear Breath Sounds  [ ]Normal Excursion  [ ]Abnormal    GI  [ ]PEG      [ ] +BS		              [ ]Soft, nondistended, nontender	  [ ]Abnormal    MUSCULOSKELETAL                                   [ ]Bedbound                 [ ]Abnormal    [ ]Ambulatory/OOB to chair                           EXTREMITIES                                         [ ]Normal  [ ]Edema                           NEUROLOGIC  [ ] Normal, non focal  [ ] Focal findings:    PSYCHIATRIC  [ ]Alert and appropriate  [ ] Sedated	 [ ]Agitated    :  Mcbride: [ ] Yes, if yes: Date of Placement:                   [  ] No    LINES: Central Lines [ ] Yes, if yes: Date of Placement                                     [  ] No    HOSPITAL MEDICATIONS:  MEDICATIONS  (STANDING):  albuterol/ipratropium for Nebulization 3 milliLiter(s) Nebulizer every 6 hours  artificial  tears Solution 1 Drop(s) Both EYES every 6 hours  ascorbic acid 500 milliGRAM(s) Oral daily  Biotene Dry Mouth Oral Rinse 5 milliLiter(s) Swish and Spit every 6 hours  calcium carbonate 1250 mG  + Vitamin D (OsCal 500 + D) 1 Tablet(s) Oral <User Schedule>  cefepime   IVPB 2000 milliGRAM(s) IV Intermittent every 8 hours  chlorhexidine 0.12% Liquid 15 milliLiter(s) Oral Mucosa every 12 hours  chlorhexidine 4% Liquid 1 Application(s) Topical daily  dextrose 5%. 1000 milliLiter(s) (50 mL/Hr) IV Continuous <Continuous>  dextrose 5%. 1000 milliLiter(s) (50 mL/Hr) IV Continuous <Continuous>  dextrose 5%. 1000 milliLiter(s) (100 mL/Hr) IV Continuous <Continuous>  dextrose 5%. 1000 milliLiter(s) (50 mL/Hr) IV Continuous <Continuous>  dextrose 5%. 1000 milliLiter(s) (100 mL/Hr) IV Continuous <Continuous>  dextrose 50% Injectable 25 Gram(s) IV Push once  dextrose 50% Injectable 12.5 Gram(s) IV Push once  dextrose 50% Injectable 25 Gram(s) IV Push once  diclofenac sodium 1% Gel 2 Gram(s) Topical every 6 hours  erythromycin   IVPB 160 milliGRAM(s) IV Intermittent every 8 hours  escitalopram 10 milliGRAM(s) Oral <User Schedule>  fentaNYL   Patch  12 MICROgram(s)/Hr 1 Patch Transdermal every 72 hours  fentaNYL   Patch  25 MICROgram(s)/Hr 1 Patch Transdermal every 72 hours  gabapentin Solution 250 milliGRAM(s) Oral <User Schedule>  glucagon  Injectable 1 milliGRAM(s) IntraMuscular once  glucagon  Injectable 1 milliGRAM(s) IntraMuscular once  hemorrhoidal Ointment 1 Application(s) Rectal at bedtime  influenza  Vaccine (HIGH DOSE) 0.5 milliLiter(s) IntraMuscular once  insulin glargine Injectable (LANTUS) 5 Unit(s) SubCutaneous <User Schedule>  insulin lispro (ADMELOG) corrective regimen sliding scale   SubCutaneous every 6 hours  lactobacillus acidophilus 1 Tablet(s) Oral <User Schedule>  levothyroxine 125 MICROGram(s) Oral <User Schedule>  lidocaine   4% Patch 4 Patch Transdermal every 24 hours  magnesium sulfate  IVPB 2 Gram(s) IV Intermittent every 4 hours  melatonin 12 milliGRAM(s) Oral <User Schedule>  nystatin Powder 1 Application(s) Topical every 8 hours  pantoprazole  Injectable 40 milliGRAM(s) IV Push every 12 hours  predniSONE  Solution 5 milliGRAM(s) Enteral Tube <User Schedule>  QUEtiapine 12.5 milliGRAM(s) Oral <User Schedule>  sodium chloride 1 Gram(s) Oral every 8 hours  sodium chloride 0.65% Nasal 1 Spray(s) Both Nostrils every 6 hours  sodium chloride 0.9%. 500 milliLiter(s) (30 mL/Hr) IV Continuous <Continuous>  sodium chloride 3%  Inhalation 4 milliLiter(s) Inhalation every 6 hours  vancomycin    Solution 125 milliGRAM(s) Oral every 6 hours  witch hazel Pads 1 Application(s) Topical every 12 hours    MEDICATIONS  (PRN):  acetaminophen   Oral Liquid .. 650 milliGRAM(s) Oral every 6 hours PRN Temp greater or equal to 38C (100.4F), Mild Pain (1 - 3)  aluminum hydroxide/magnesium hydroxide/simethicone Suspension 30 milliLiter(s) Enteral Tube every 4 hours PRN Dyspepsia  oxyCODONE    Solution 5 milliGRAM(s) Oral every 6 hours PRN Moderate Pain (4 - 6)  oxyCODONE    Solution 10 milliGRAM(s) Oral every 6 hours PRN Severe Pain (7 - 10)  oxyCODONE    Solution 2.5 milliGRAM(s) Oral every 6 hours PRN Mild Pain (1 - 3)      LABS:                        8.6    11.48 )-----------( 152      ( 14 Nov 2024 06:39 )             29.8     11-14    133[L]  |  105  |  16  ----------------------------<  114[H]  3.9   |  14[L]  |  <0.30[L]    Ca    8.6      14 Nov 2024 06:39  Phos  3.6     11-14  Mg     1.5     11-14    TPro  6.3  /  Alb  2.1[L]  /  TBili  1.1  /  DBili  x   /  AST  22  /  ALT  13  /  AlkPhos  96  11-14      Urinalysis Basic - ( 14 Nov 2024 06:39 )    Color: x / Appearance: x / SG: x / pH: x  Gluc: 114 mg/dL / Ketone: x  / Bili: x / Urobili: x   Blood: x / Protein: x / Nitrite: x   Leuk Esterase: x / RBC: x / WBC x   Sq Epi: x / Non Sq Epi: x / Bacteria: x        Venous Blood Gas:  11-13 @ 13:35  7.29/30/95/14/98.9  VBG Lactate: 1.2  Venous Blood Gas:  11-13 @ 06:39  7.23/37/67/16/93.6  VBG Lactate: 1.5  Venous Blood Gas:  11-12 @ 13:10  7.30/31/112/15/99.1  VBG Lactate: 1.8    CAPILLARY BLOOD GLUCOSE    MICROBIOLOGY:     RADIOLOGY:  [ ] Reviewed and interpreted by me    Mode: AC/ CMV (Assist Control/ Continuous Mandatory Ventilation)  RR (machine): 20  TV (machine): 350  FiO2: 30  PEEP: 5  ITime: 1  MAP: 11  PIP: 32   Patient is a 74y old  Female who presents with a chief complaint of Patient admitted with Hypoxemia and episode of Bright red blood per rectum (13 Nov 2024 17:30)      Interval Events: S/p Peg adjustment w/ Dr Rosales; Feeds held overnight                          PNA continuing cefepime til 11/15     REVIEW OF SYSTEMS:  [ ] Positive  [ ] All other systems negative  [x ] Unable to assess ROS because _Minimally responsive, Trach to vent _______    Vital Signs Last 24 Hrs  T(C): 36.8 (11-14-24 @ 04:51), Max: 38.2 (11-13-24 @ 13:07)  T(F): 98.2 (11-14-24 @ 04:51), Max: 100.7 (11-13-24 @ 13:07)  HR: 82 (11-14-24 @ 06:08) (72 - 111)  BP: 167/66 (11-14-24 @ 04:51) (103/54 - 167/66)  RR: 20 (11-14-24 @ 04:51) (16 - 24)  SpO2: 100% (11-14-24 @ 06:08) (100% - 100%)    PHYSICAL EXAM:    HEENT:   [X] Tracheostomy: #8 distal XLT cuffed Shiley   [X] PERRLA  [ ] No oral lesions  [ ] Abnormal    SKIN  [ ] No Rash  [ ] Abnormal  [X] pressure - sacral unstageable    CARDIAC  [X] Regular  [ ] Abnormal    PULMONARY  [ ] Bilateral Clear Breath Sounds  [ ] Normal Excursion  [X] Abnormal - Diffuses wheezing bilaterally     GI  [X] PEG site covered w/ dry dressing    [X] decreased BS	              [X] Soft, nondistended, nontender	  [X] Abnormal - old PEG site with small opening c/d/i,     MUSCULOSKELETAL                                   [X] Bedbound                 [ ] Abnormal    [ ] Ambulatory/OOB to chair                           EXTREMITIES                                         [ ] Normal  [X] Edema - mod generalized pitting edema                          NEUROLOGIC  [ ] Normal, non focal  [X] Focal findings: lethargic, Minimally responsive to voice, grimacing occasionally, not following commands, no purposeful movements in all extremities, minimally withdrawals to noxious stimuli     PSYCHIATRIC  [x] Lethargic and UTO   [ ] Sedated	 [ ]Agitated    :  Mcbride: [ ] Yes, if yes: Date of Placement:                   [X] No    LINES: Central Lines [ ] Yes, if yes: Date of Placement                                     [X] No        HOSPITAL MEDICATIONS:  MEDICATIONS  (STANDING):  albuterol/ipratropium for Nebulization 3 milliLiter(s) Nebulizer every 6 hours  artificial  tears Solution 1 Drop(s) Both EYES every 6 hours  ascorbic acid 500 milliGRAM(s) Oral daily  Biotene Dry Mouth Oral Rinse 5 milliLiter(s) Swish and Spit every 6 hours  calcium carbonate 1250 mG  + Vitamin D (OsCal 500 + D) 1 Tablet(s) Oral <User Schedule>  cefepime   IVPB 2000 milliGRAM(s) IV Intermittent every 8 hours  chlorhexidine 0.12% Liquid 15 milliLiter(s) Oral Mucosa every 12 hours  chlorhexidine 4% Liquid 1 Application(s) Topical daily  dextrose 5%. 1000 milliLiter(s) (50 mL/Hr) IV Continuous <Continuous>  dextrose 5%. 1000 milliLiter(s) (50 mL/Hr) IV Continuous <Continuous>  dextrose 5%. 1000 milliLiter(s) (100 mL/Hr) IV Continuous <Continuous>  dextrose 5%. 1000 milliLiter(s) (50 mL/Hr) IV Continuous <Continuous>  dextrose 5%. 1000 milliLiter(s) (100 mL/Hr) IV Continuous <Continuous>  dextrose 50% Injectable 25 Gram(s) IV Push once  dextrose 50% Injectable 12.5 Gram(s) IV Push once  dextrose 50% Injectable 25 Gram(s) IV Push once  diclofenac sodium 1% Gel 2 Gram(s) Topical every 6 hours  erythromycin   IVPB 160 milliGRAM(s) IV Intermittent every 8 hours  escitalopram 10 milliGRAM(s) Oral <User Schedule>  fentaNYL   Patch  12 MICROgram(s)/Hr 1 Patch Transdermal every 72 hours  fentaNYL   Patch  25 MICROgram(s)/Hr 1 Patch Transdermal every 72 hours  gabapentin Solution 250 milliGRAM(s) Oral <User Schedule>  glucagon  Injectable 1 milliGRAM(s) IntraMuscular once  glucagon  Injectable 1 milliGRAM(s) IntraMuscular once  hemorrhoidal Ointment 1 Application(s) Rectal at bedtime  influenza  Vaccine (HIGH DOSE) 0.5 milliLiter(s) IntraMuscular once  insulin glargine Injectable (LANTUS) 5 Unit(s) SubCutaneous <User Schedule>  insulin lispro (ADMELOG) corrective regimen sliding scale   SubCutaneous every 6 hours  lactobacillus acidophilus 1 Tablet(s) Oral <User Schedule>  levothyroxine 125 MICROGram(s) Oral <User Schedule>  lidocaine   4% Patch 4 Patch Transdermal every 24 hours  magnesium sulfate  IVPB 2 Gram(s) IV Intermittent every 4 hours  melatonin 12 milliGRAM(s) Oral <User Schedule>  nystatin Powder 1 Application(s) Topical every 8 hours  pantoprazole  Injectable 40 milliGRAM(s) IV Push every 12 hours  predniSONE  Solution 5 milliGRAM(s) Enteral Tube <User Schedule>  QUEtiapine 12.5 milliGRAM(s) Oral <User Schedule>  sodium chloride 1 Gram(s) Oral every 8 hours  sodium chloride 0.65% Nasal 1 Spray(s) Both Nostrils every 6 hours  sodium chloride 0.9%. 500 milliLiter(s) (30 mL/Hr) IV Continuous <Continuous>  sodium chloride 3%  Inhalation 4 milliLiter(s) Inhalation every 6 hours  vancomycin    Solution 125 milliGRAM(s) Oral every 6 hours  witch hazel Pads 1 Application(s) Topical every 12 hours    MEDICATIONS  (PRN):  acetaminophen   Oral Liquid .. 650 milliGRAM(s) Oral every 6 hours PRN Temp greater or equal to 38C (100.4F), Mild Pain (1 - 3)  aluminum hydroxide/magnesium hydroxide/simethicone Suspension 30 milliLiter(s) Enteral Tube every 4 hours PRN Dyspepsia  oxyCODONE    Solution 5 milliGRAM(s) Oral every 6 hours PRN Moderate Pain (4 - 6)  oxyCODONE    Solution 10 milliGRAM(s) Oral every 6 hours PRN Severe Pain (7 - 10)  oxyCODONE    Solution 2.5 milliGRAM(s) Oral every 6 hours PRN Mild Pain (1 - 3)      LABS:                        8.6    11.48 )-----------( 152      ( 14 Nov 2024 06:39 )             29.8     11-14    133[L]  |  105  |  16  ----------------------------<  114[H]  3.9   |  14[L]  |  <0.30[L]    Ca    8.6      14 Nov 2024 06:39  Phos  3.6     11-14  Mg     1.5     11-14    TPro  6.3  /  Alb  2.1[L]  /  TBili  1.1  /  DBili  x   /  AST  22  /  ALT  13  /  AlkPhos  96  11-14      Urinalysis Basic - ( 14 Nov 2024 06:39 )    Color: x / Appearance: x / SG: x / pH: x  Gluc: 114 mg/dL / Ketone: x  / Bili: x / Urobili: x   Blood: x / Protein: x / Nitrite: x   Leuk Esterase: x / RBC: x / WBC x   Sq Epi: x / Non Sq Epi: x / Bacteria: x        Venous Blood Gas:  11-13 @ 13:35  7.29/30/95/14/98.9  VBG Lactate: 1.2  Venous Blood Gas:  11-13 @ 06:39  7.23/37/67/16/93.6  VBG Lactate: 1.5  Venous Blood Gas:  11-12 @ 13:10  7.30/31/112/15/99.1  VBG Lactate: 1.8    CAPILLARY BLOOD GLUCOSE    MICROBIOLOGY:     RADIOLOGY:  [ ] Reviewed and interpreted by me    Mode: AC/ CMV (Assist Control/ Continuous Mandatory Ventilation)  RR (machine): 20  TV (machine): 350  FiO2: 30  PEEP: 5  ITime: 1  MAP: 11  PIP: 32

## 2024-11-14 NOTE — PROGRESS NOTE ADULT - SUBJECTIVE AND OBJECTIVE BOX
Follow Up:  fevers pneumonia    Interval History/ROS:  lethargic    Allergies  walnut (Unknown)  metronidazole (Rash)  Lyrica (Unknown)  penicillin (Unknown)  Pineapple (Unknown)  Tagamet (Unknown)  heparin (Unknown)  Pecans (Unknown)  Hazelnut (Unknown)  meropenem (Rash)        ANTIMICROBIALS:  cefepime   IVPB 2000 every 8 hours  erythromycin   IVPB 160 every 8 hours  vancomycin    Solution 125 every 6 hours      OTHER MEDS:  MEDICATIONS  (STANDING):  acetaminophen   Oral Liquid .. 650 every 6 hours PRN  albuterol/ipratropium for Nebulization 3 every 6 hours  aluminum hydroxide/magnesium hydroxide/simethicone Suspension 30 every 4 hours PRN  dextrose 50% Injectable 12.5 once  dextrose 50% Injectable 25 once  dextrose 50% Injectable 25 once  escitalopram 10 <User Schedule>  fentaNYL   Patch  12 MICROgram(s)/Hr 1 every 72 hours  fentaNYL   Patch  25 MICROgram(s)/Hr 1 every 72 hours  gabapentin Solution 250 <User Schedule>  glucagon  Injectable 1 once  glucagon  Injectable 1 once  influenza  Vaccine (HIGH DOSE) 0.5 once  insulin glargine Injectable (LANTUS) 10 <User Schedule>  insulin lispro (ADMELOG) corrective regimen sliding scale  every 6 hours  levothyroxine 125 <User Schedule>  melatonin 12 <User Schedule>  oxyCODONE    Solution 5 every 6 hours PRN  oxyCODONE    Solution 10 every 6 hours PRN  oxyCODONE    Solution 2.5 every 6 hours PRN  pantoprazole  Injectable 40 every 12 hours  predniSONE  Solution 5 <User Schedule>  QUEtiapine 12.5 <User Schedule>  sodium chloride 3%  Inhalation 4 every 6 hours      Vital Signs Last 24 Hrs  T(C): 36.4 (14 Nov 2024 17:22), Max: 38.2 (14 Nov 2024 12:32)  T(F): 97.5 (14 Nov 2024 17:22), Max: 100.7 (14 Nov 2024 12:32)  HR: 92 (14 Nov 2024 17:22) (72 - 117)  BP: 140/85 (14 Nov 2024 17:22) (140/85 - 167/66)  BP(mean): --  RR: 20 (14 Nov 2024 17:22) (18 - 20)  SpO2: 100% (14 Nov 2024 17:22) (97% - 100%)    Parameters below as of 14 Nov 2024 17:22  Patient On (Oxygen Delivery Method): ventilator        PHYSICAL EXAM:  General: WN/WD NAD, Non-toxic  Neurology: A&Ox3, nonfocal  Respiratory: Clear to auscultation bilaterally  CV: RRR, S1S2, no murmurs, rubs or gallops  Abdominal: Soft, Non-tender, non-distended, normal bowel sounds  Extremities: generalized  edema  Line Sites: Clear  Skin: No rash                          8.6    11.48 )-----------( 152      ( 14 Nov 2024 06:39 )             29.8       11-14    133[L]  |  105  |  16  ----------------------------<  114[H]  3.9   |  14[L]  |  <0.30[L]    Ca    8.6      14 Nov 2024 06:39  Phos  3.6     11-14  Mg     1.5     11-14    TPro  6.3  /  Alb  2.1[L]  /  TBili  1.1  /  DBili  x   /  AST  22  /  ALT  13  /  AlkPhos  96  11-14      Urinalysis Basic - ( 14 Nov 2024 06:39 )    Color: x / Appearance: x / SG: x / pH: x  Gluc: 114 mg/dL / Ketone: x  / Bili: x / Urobili: x   Blood: x / Protein: x / Nitrite: x   Leuk Esterase: x / RBC: x / WBC x   Sq Epi: x / Non Sq Epi: x / Bacteria: x        MICROBIOLOGY:  Trach Asp Tracheal Aspirate  11-10-24   Few Pseudomonas aeruginosa  Commensal vinay consistent with body site  --    Numerous polymorphonuclear leukocytes per low power field  Rare Squamous epithelial cells per low power field  Rare Gram positive cocci in pairs per oil power field  Few Gram Negative Rods per oil power field  Few Gram Positive Rods per oil power field      .Blood BLOOD  11-10-24   No growth at 4 days  --  --      .Sputum Sputum  11-04-24   Moderate Mixed gram negative rods including  Moderate Pseudomonas aeruginosa  Commensal vinay consistent with body site  --  Pseudomonas aeruginosa      Clean Catch Clean Catch (Midstream)  11-03-24   No growth  --  --      .Blood BLOOD  11-03-24   No growth at 5 days  --  --      .Blood BLOOD  11-03-24   No growth at 5 days  --  --      .Blood BLOOD  11-01-24   Growth in anaerobic bottle: Staphylococcus epidermidis  Isolation of Coagulase negative Staphylococcus from single blood culture  sets may represent  contamination. Contact the Microbiology Department at [phone] if  susceptibility testing is needed.  clinically indicated.  Direct identification is available within approximately 3-5  hours either by Blood Panel Multiplexed PCR or Direct  MALDI-TOF. Details: [email or web address]  --  Blood Culture PCR      Catheterized Catheterized  11-01-24   No growth  --  --      .Blood BLOOD  11-01-24   No growth at 5 days  --  --      Trach Asp Tracheal Aspirate  10-27-24   Moderate Pseudomonas aeruginosa  Moderate Pseudomonas aeruginosa #2  Multiple Morphological Strains  Commensal vinay consistent with body site  --  Pseudomonas aeruginosa  Pseudomonas aeruginosa      .Blood BLOOD  10-27-24   No growth at 5 days  --  --      .Blood BLOOD  10-17-24   No growth at 5 days  --  --      .Blood BLOOD  10-17-24   No growth at 5 days  --  --      .Blood BLOOD  10-15-24   No growth at 5 days  --  --          v          RADIOLOGY:    Zion Newman MD; Division of Infectious Disease; Pager: [phone]; nights and weekends: [phone]

## 2024-11-15 NOTE — PROGRESS NOTE ADULT - ASSESSMENT
75 yo F PMH T2DM on insulin, HTN, HLD, hypothyroidism, RA on Prednisone, Fibromyalgia, cerebral aneurysm s/p repair, chronic hypercapnic respiratory failure s/p trach (vent dependent) and chronic PEG 2022, recurrent C. diff infection (follows with Dr. Newman), persistent GJ tube leaks now s/p GC fistula repair 8/12 BIBEMS on 10/7 with daughter for respiratory distress, hypoxia to 88%.  Pt also noted to have rectal bleeding week prior to presentation requiring transfusion 5 days ago in ED as per daughter. Daughter also reports brownish discharge from G-J tube. In ED, pt afebrile, fiO2 96-98% on 40% on ventilator.  Chest X-ray w/ opacification of right lung concerning for possible PNA.  Admitted to RCU for further management. Sputum cxs grew PSA; treated with course of Cefepime. Patient with decreased H+H received 1 unit of PRBCS on 10/10.  10/14 EGD w/ Dr. Rosales for PEGJ exchange and clippings placed for closure of fistula site.  Persistent fevers treated with meropenem and vanc (d/c'd 10/20).  CT A/P without infectious source.  Continued fevers and persistent leukocytosis 11/1 CDiff positive - po vanco started, IV flagyl added (11/3-11/8). Head CT performed on 11/6 due to concern of lethargy no acute intracranial findings were noted; likely related to metabolic encephalopathy. Patient continued to have persistent fevers and was empirically treated with Cefepime (11/5-11/9) for CR PSA in sputum cx. CT C/A/P performed 11/6 c/w consolidative opacity LLL. Evening of 11/10-11/11, WBC 15 --> 21, procal 1 --> 43, lactate 3.2 -- pt also febrile. Infectious w/u sent 11/10 -- BCx negative, trach aspirate cx pending, UA negative. CT CAP on 11/11 showed left greater than right basilar consolidation and patchy ground glass opacity in both lungs consistent with atelectasis and/or pneumonia; Feeding tube coils in the stomach with tip within the first portion of the duodenum, No bowel obstruction. Cefepime restarted 11/11-11/15 with improvement in leukocytosis. Patient underwent Endoscopic adjustment of peg 11/13 with Dr. Rosales. XR tube check performed.     11/14: S/p Endoscopic Peg adjustment 11/13 w/ Dr Rosales. AXR performed, Dr Rosales will review finding. Tube Feeds resumed at 40cc/hr with no titration today after discussion w/ GI and Attending. Still w/ low grade fevers but fever curve improving, Leukocytosis continues to improve, will continue Cefepime thru 11/15 per ID. Not tolerating Volera for airway clearance (periods of Desaturation) therefore discontinued. Monitor off rectal tube as without diarrhea.  73 yo F PMH T2DM on insulin, HTN, HLD, hypothyroidism, RA on Prednisone, Fibromyalgia, cerebral aneurysm s/p repair, chronic hypercapnic respiratory failure s/p trach (vent dependent) and chronic PEG 2022, recurrent C. diff infection (follows with Dr. Newman), persistent GJ tube leaks now s/p GC fistula repair 8/12 BIBEMS on 10/7 with daughter for respiratory distress, hypoxia to 88%.  Pt also noted to have rectal bleeding week prior to presentation requiring transfusion 5 days ago in ED as per daughter. Daughter also reports brownish discharge from G-J tube. In ED, pt afebrile, fiO2 96-98% on 40% on ventilator.  Chest X-ray w/ opacification of right lung concerning for possible PNA.  Admitted to RCU for further management. Sputum cxs grew PSA; treated with course of Cefepime. Patient with decreased H+H received 1 unit of PRBCS on 10/10.  10/14 EGD w/ Dr. Rosales for PEGJ exchange and clippings placed for closure of fistula site.  Persistent fevers treated with meropenem and vanc (d/c'd 10/20).  CT A/P without infectious source.  Continued fevers and persistent leukocytosis 11/1 CDiff positive - po vanco started, IV flagyl added (11/3-11/8). Head CT performed on 11/6 due to concern of lethargy no acute intracranial findings were noted; likely related to metabolic encephalopathy. Patient continued to have persistent fevers and was empirically treated with Cefepime (11/5-11/9) for CR PSA in sputum cx. CT C/A/P performed 11/6 c/w consolidative opacity LLL. Evening of 11/10-11/11, WBC 15 --> 21, procal 1 --> 43, lactate 3.2 -- pt also febrile. Infectious w/u sent 11/10 -- BCx negative, trach aspirate cx pending, UA negative. CT CAP on 11/11 showed left greater than right basilar consolidation and patchy ground glass opacity in both lungs consistent with atelectasis and/or pneumonia; Feeding tube coils in the stomach with tip within the first portion of the duodenum, No bowel obstruction. Cefepime restarted 11/11-11/15 with improvement in leukocytosis. Patient underwent Endoscopic adjustment of peg 11/13 with Dr. Rosales. XR tube check performed.     11/15: S/p Endoscopic Peg adjustment 11/13 w/ Dr Rosales. AXR performed. Tube Feeds resumed at 40cc/hr for 24hr. Not tolerating Volera for airway clearance (periods of Desaturation) therefore discontinued. Monitor off rectal tube as without diarrhea.

## 2024-11-15 NOTE — PROGRESS NOTE ADULT - SUBJECTIVE AND OBJECTIVE BOX
Seen earlier today, spoke with private aide at bedside and RN      Chief Complaint: Diabetes Mellitus follow up    INTERVAL HX: Tolerating 24 hour continuous tube feeding( [place] Peptide 1.5 (KFPEPT1.5RTH) at 40 ml/hr). Last 24 hour BGs 100s-200s with BGs in 100s today while on Lantus 10 units and moderate correction scale only. On Prednisone 5 mg daily. Not having diarrhea anymore aS per RN.     Review of Systems:  pt was asleep   No ROS obtained     Allergies    walnut (Unknown)  metronidazole (Rash)  Lyrica (Unknown)  penicillin (Unknown)  Pineapple (Unknown)  Tagamet (Unknown)  heparin (Unknown)  Pecans (Unknown)  Hazelnut (Unknown)  meropenem (Rash)    Intolerances      MEDICATIONS  (STANDING):  albuterol/ipratropium for Nebulization 3 milliLiter(s) Nebulizer every 6 hours  artificial  tears Solution 1 Drop(s) Both EYES every 6 hours  ascorbic acid 500 milliGRAM(s) Oral daily  Biotene Dry Mouth Oral Rinse 5 milliLiter(s) Swish and Spit every 6 hours  calcium carbonate 1250 mG  + Vitamin D (OsCal 500 + D) 1 Tablet(s) Oral <User Schedule>  chlorhexidine 0.12% Liquid 15 milliLiter(s) Oral Mucosa every 12 hours  chlorhexidine 4% Liquid 1 Application(s) Topical daily  dextrose 5%. 1000 milliLiter(s) (100 mL/Hr) IV Continuous <Continuous>  dextrose 5%. 1000 milliLiter(s) (50 mL/Hr) IV Continuous <Continuous>  dextrose 5%. 1000 milliLiter(s) (100 mL/Hr) IV Continuous <Continuous>  dextrose 5%. 1000 milliLiter(s) (50 mL/Hr) IV Continuous <Continuous>  dextrose 50% Injectable 25 Gram(s) IV Push once  dextrose 50% Injectable 12.5 Gram(s) IV Push once  dextrose 50% Injectable 25 Gram(s) IV Push once  diclofenac sodium 1% Gel 2 Gram(s) Topical every 6 hours  erythromycin   IVPB 160 milliGRAM(s) IV Intermittent every 8 hours  escitalopram 10 milliGRAM(s) Oral <User Schedule>  fentaNYL   Patch  12 MICROgram(s)/Hr 1 Patch Transdermal every 72 hours  fentaNYL   Patch  25 MICROgram(s)/Hr 1 Patch Transdermal every 72 hours  gabapentin Solution 250 milliGRAM(s) Oral <User Schedule>  glucagon  Injectable 1 milliGRAM(s) IntraMuscular once  glucagon  Injectable 1 milliGRAM(s) IntraMuscular once  hemorrhoidal Ointment 1 Application(s) Rectal at bedtime  influenza  Vaccine (HIGH DOSE) 0.5 milliLiter(s) IntraMuscular once  insulin glargine Injectable (LANTUS) 10 Unit(s) SubCutaneous <User Schedule>  insulin lispro (ADMELOG) corrective regimen sliding scale   SubCutaneous every 6 hours  lactobacillus acidophilus 1 Tablet(s) Oral <User Schedule>  levothyroxine 125 MICROGram(s) Oral <User Schedule>  lidocaine   4% Patch 4 Patch Transdermal every 24 hours  melatonin 12 milliGRAM(s) Oral <User Schedule>  nystatin Powder 1 Application(s) Topical every 8 hours  pantoprazole  Injectable 40 milliGRAM(s) IV Push every 12 hours  predniSONE  Solution 5 milliGRAM(s) Enteral Tube <User Schedule>  QUEtiapine 12.5 milliGRAM(s) Oral <User Schedule>  sodium chloride 1 Gram(s) Oral every 8 hours  sodium chloride 0.65% Nasal 1 Spray(s) Both Nostrils every 6 hours  sodium chloride 3%  Inhalation 4 milliLiter(s) Inhalation every 6 hours  vancomycin    Solution 125 milliGRAM(s) Oral every 6 hours  witch hazel Pads 1 Application(s) Topical every 12 hours        insulin glargine Injectable (LANTUS)   10 Unit(s) SubCutaneous (11-15-24 @ 05:50)    insulin lispro (ADMELOG) corrective regimen sliding scale   2 Unit(s) SubCutaneous (11-15-24 @ 13:12)   4 Unit(s) SubCutaneous (11-15-24 @ 00:19)   4 Unit(s) SubCutaneous (11-14-24 @ 17:57)    levothyroxine   125 MICROGram(s) Oral (11-15-24 @ 04:15)    predniSONE  Solution   5 milliGRAM(s) Enteral Tube (11-15-24 @ 11:25)        PHYSICAL EXAM:  VITALS: T(C): 37 (11-15-24 @ 12:42)  T(F): 98.6 (11-15-24 @ 12:42), Max: 98.6 (11-15-24 @ 12:42)  HR: 89 (11-15-24 @ 14:29) (73 - 101)  BP: 166/74 (11-15-24 @ 12:42) (152/71 - 166/74)  RR:  (18 - 23)  SpO2:  (100% - 100%)  Wt(kg): --  GENERAL: Female laying in bedin NAD  Respiratory: +Trach and ventilator support   Extremities: Warm, no edema  NEURO: asleep     LABS:  POCT Blood Glucose.: 187 mg/dL (11-15-24 @ 17:17)  POCT Blood Glucose.: 171 mg/dL (11-15-24 @ 12:40)  POCT Blood Glucose.: 125 mg/dL (11-15-24 @ 05:27)  POCT Blood Glucose.: 220 mg/dL (11-14-24 @ 23:47)  POCT Blood Glucose.: 227 mg/dL (11-14-24 @ 17:24)  POCT Blood Glucose.: 108 mg/dL (11-14-24 @ 12:21)  POCT Blood Glucose.: 115 mg/dL (11-14-24 @ 05:50)  POCT Blood Glucose.: 142 mg/dL (11-13-24 @ 23:28)  POCT Blood Glucose.: 191 mg/dL (11-13-24 @ 17:48)  POCT Blood Glucose.: 161 mg/dL (11-13-24 @ 12:45)  POCT Blood Glucose.: 146 mg/dL (11-13-24 @ 05:48)  POCT Blood Glucose.: 163 mg/dL (11-12-24 @ 23:33)                          8.6    10.66 )-----------( 159      ( 15 Nov 2024 06:37 )             28.9     11-15    138  |  108  |  16  ----------------------------<  130[H]  3.3[L]   |  16[L]  |  <0.30[L]    Ca    8.8      15 Nov 2024 06:37  Phos  3.2     11-15  Mg     2.0     11-15    TPro  6.3  /  Alb  2.2[L]  /  TBili  1.0  /  DBili  x   /  AST  20  /  ALT  12  /  AlkPhos  92  11-15    eGFR: 113 mL/min/1.73m2 (15 Nov 2024 06:37)      Thyroid Function Tests:      A1C with Estimated Average Glucose Result: 5.8 % (10-14-24 @ 05:08)    Estimated Average Glucose: 120 mg/dL (10-14-24 @ 05:08)        Diet, NPO with Tube Feed:   Tube Feeding Modality: Jejunostomy  [place] Peptide 1.5 (KFPEPT1.5RTH)  Total Volume for 24 Hours (mL): 960  Continuous  Until Goal Tube Feed Rate (mL per Hour): 40  Tube Feed Duration (in Hours): 24  Tube Feed Start Time: 06:00  Free Water Flush  Pump   Rate (mL per Hour): 50   Frequency: Every Hour  Free Water Flush Instructions:  50ml free water flushes Q1hr  Alfred(7 Gm Arginine/7 Gm Glut/1.2 Gm HMB     Qty per Day:  2  No Carb Prosource (1pkg = 15gms Protein)     Qty per Day:  1  Banatrol TF     Qty per Day:  1/2 packet per daily (11-15-24 @ 14:09) [Active]

## 2024-11-15 NOTE — PROGRESS NOTE ADULT - SUBJECTIVE AND OBJECTIVE BOX
11/15/2025    Reviewed abd xray performed subsequent to EGD.  J tube is in the jejunum.   Can resume J tube feeding as desired.      TAMARA BEASLEY MD

## 2024-11-15 NOTE — PROGRESS NOTE ADULT - ASSESSMENT
75 yo F PMH T2DM on insulin, HTN, HLD, hypothyroidism, RA on Prednisone, Fibromyalgia, cerebral aneurysm s/p repair, chronic hypercapnic respiratory failure s/p trach (vent dependent) and chronic PEG 2022, recurrent C. diff infection (follows with Dr. Newman), persistent GJ tube leaks now s/p GC fistula repair 8/12 with hypoxia, PNA, with anemia    #anemia, AOCD + phlebotomy  - pt with multiple chronic issues causing AOCD with acute worsening in context of acute illness + phlebotomy  - was fecal occult + on admission but now no overt bleeding  - no renal insufficiency  - s/p PRBC 10/10, 10/30  - iron studies 10/30 with AOCD, no evidence of hemolysis  - WBC/plt stable; plt with some fluctuation, currently normal  - B12/folate normal  - discussed with daughter prior that unfortunately no simple treatment for anemia of chronic disease besides transfusions as needed  - s/p 1 unit PRBC 11/12  - hg improved

## 2024-11-15 NOTE — PROGRESS NOTE ADULT - SUBJECTIVE AND OBJECTIVE BOX
Chief Complaint:  FU anemia    History of Present Illness:  resting, briefly awake earlier per Aide at bedside; appears comfortable; no bleeding reported; ROS not obtainable      MEDICATIONS  (STANDING):  albuterol/ipratropium for Nebulization 3 milliLiter(s) Nebulizer every 6 hours  artificial  tears Solution 1 Drop(s) Both EYES every 6 hours  ascorbic acid 500 milliGRAM(s) Oral daily  Biotene Dry Mouth Oral Rinse 5 milliLiter(s) Swish and Spit every 6 hours  calcium carbonate 1250 mG  + Vitamin D (OsCal 500 + D) 1 Tablet(s) Oral <User Schedule>  chlorhexidine 0.12% Liquid 15 milliLiter(s) Oral Mucosa every 12 hours  chlorhexidine 4% Liquid 1 Application(s) Topical daily  dextrose 5%. 1000 milliLiter(s) (100 mL/Hr) IV Continuous <Continuous>  dextrose 5%. 1000 milliLiter(s) (50 mL/Hr) IV Continuous <Continuous>  dextrose 5%. 1000 milliLiter(s) (50 mL/Hr) IV Continuous <Continuous>  dextrose 5%. 1000 milliLiter(s) (100 mL/Hr) IV Continuous <Continuous>  dextrose 50% Injectable 25 Gram(s) IV Push once  dextrose 50% Injectable 25 Gram(s) IV Push once  dextrose 50% Injectable 12.5 Gram(s) IV Push once  diclofenac sodium 1% Gel 2 Gram(s) Topical every 6 hours  erythromycin   IVPB 160 milliGRAM(s) IV Intermittent every 8 hours  escitalopram 10 milliGRAM(s) Oral <User Schedule>  fentaNYL   Patch  12 MICROgram(s)/Hr 1 Patch Transdermal every 72 hours  fentaNYL   Patch  25 MICROgram(s)/Hr 1 Patch Transdermal every 72 hours  gabapentin Solution 250 milliGRAM(s) Oral <User Schedule>  glucagon  Injectable 1 milliGRAM(s) IntraMuscular once  glucagon  Injectable 1 milliGRAM(s) IntraMuscular once  hemorrhoidal Ointment 1 Application(s) Rectal at bedtime  influenza  Vaccine (HIGH DOSE) 0.5 milliLiter(s) IntraMuscular once  insulin glargine Injectable (LANTUS) 10 Unit(s) SubCutaneous <User Schedule>  insulin lispro (ADMELOG) corrective regimen sliding scale   SubCutaneous every 6 hours  lactobacillus acidophilus 1 Tablet(s) Oral <User Schedule>  levothyroxine 125 MICROGram(s) Oral <User Schedule>  lidocaine   4% Patch 4 Patch Transdermal every 24 hours  melatonin 12 milliGRAM(s) Oral <User Schedule>  nystatin Powder 1 Application(s) Topical every 8 hours  pantoprazole  Injectable 40 milliGRAM(s) IV Push every 12 hours  predniSONE  Solution 5 milliGRAM(s) Enteral Tube <User Schedule>  QUEtiapine 12.5 milliGRAM(s) Oral <User Schedule>  sodium chloride 1 Gram(s) Oral every 8 hours  sodium chloride 0.65% Nasal 1 Spray(s) Both Nostrils every 6 hours  sodium chloride 3%  Inhalation 4 milliLiter(s) Inhalation every 6 hours  vancomycin    Solution 125 milliGRAM(s) Oral every 6 hours  witch hazel Pads 1 Application(s) Topical every 12 hours    MEDICATIONS  (PRN):  acetaminophen   Oral Liquid .. 650 milliGRAM(s) Oral every 6 hours PRN Temp greater or equal to 38C (100.4F), Mild Pain (1 - 3)  aluminum hydroxide/magnesium hydroxide/simethicone Suspension 30 milliLiter(s) Enteral Tube every 4 hours PRN Dyspepsia  oxyCODONE    Solution 5 milliGRAM(s) Oral every 6 hours PRN Moderate Pain (4 - 6)  oxyCODONE    Solution 10 milliGRAM(s) Oral every 6 hours PRN Severe Pain (7 - 10)  oxyCODONE    Solution 2.5 milliGRAM(s) Oral every 6 hours PRN Mild Pain (1 - 3)      Allergies    walnut (Unknown)  metronidazole (Rash)  Lyrica (Unknown)  penicillin (Unknown)  Pineapple (Unknown)  Tagamet (Unknown)  heparin (Unknown)  Pecans (Unknown)  Hazelnut (Unknown)  meropenem (Rash)    Intolerances        Vital Signs Last 24 Hrs  T(C): 37 (15 Nov 2024 12:42), Max: 37 (15 Nov 2024 12:42)  T(F): 98.6 (15 Nov 2024 12:42), Max: 98.6 (15 Nov 2024 12:42)  HR: 89 (15 Nov 2024 14:29) (73 - 101)  BP: 166/74 (15 Nov 2024 12:42) (140/85 - 166/74)  BP(mean): --  RR: 18 (15 Nov 2024 12:42) (18 - 23)  SpO2: 100% (15 Nov 2024 14:29) (100% - 100%)    Parameters below as of 15 Nov 2024 14:29  Patient On (Oxygen Delivery Method): ventilator        PHYSICAL EXAM  General: adult in NAD  Neck: trach  CV: normal S1/S2   Lungs: clear to auscultation, no wheezes, no rales  Abdomen: soft non-tender non-distended,  positive bowel sounds  Ext: no calf tenderness      LABS:                          8.6    10.66 )-----------( 159      ( 15 Nov 2024 06:37 )             28.9         Mean Cell Volume : 88.7 fl  Mean Cell Hemoglobin : 26.4 pg  Mean Cell Hemoglobin Concentration : 29.8 g/dL  Auto Neutrophil # : x  Auto Lymphocyte # : x  Auto Monocyte # : x  Auto Eosinophil # : x  Auto Basophil # : x  Auto Neutrophil % : x  Auto Lymphocyte % : x  Auto Monocyte % : x  Auto Eosinophil % : x  Auto Basophil % : x      Serial CBC's  11-15 @ 06:37  Hct-28.9 / Hgb-8.6 / Plat-159 / RBC-3.26 / WBC-10.66  Serial CBC's  11-14 @ 06:39  Hct-29.8 / Hgb-8.6 / Plat-152 / RBC-3.31 / WBC-11.48  Serial CBC's  11-13 @ 06:39  Hct-27.2 / Hgb-8.2 / Plat-183 / RBC-3.04 / WBC-15.15  Serial CBC's  11-12 @ 06:41  Hct-24.6 / Hgb-7.0 / Plat-183 / RBC-2.72 / WBC-22.66      11-15    138  |  108  |  16  ----------------------------<  130[H]  3.3[L]   |  16[L]  |  <0.30[L]    Ca    8.8      15 Nov 2024 06:37  Phos  3.2     11-15  Mg     2.0     11-15    TPro  6.3  /  Alb  2.2[L]  /  TBili  1.0  /  DBili  x   /  AST  20  /  ALT  12  /  AlkPhos  92  11-15                      Radiology:

## 2024-11-15 NOTE — PROGRESS NOTE ADULT - SUBJECTIVE AND OBJECTIVE BOX
Patient is a 74y old  Female who presents with a chief complaint of Patient admitted with Hypoxemia and episode of Bright red blood per rectum (15 Nov 2024 06:31)    HPI:  73 yo F PMH T2DM on insulin, HTN, HLD, hypothyroidism, RA on Prednisone, Fibromyalgia, cerebral aneurysm s/p repair, chronic hypercapnic respiratory failure s/p trach (vent dependent) and chronic PEG 2022, recurrent C. diff infection (follows with Dr. Newman), persistent GJ tube leaks now s/p GC fistula repair 8/12 BIBEMS with daughter for respiratory distress, hypoxia to high 80s.  Pt also noted to have rectal bleeding this past week requiring transfusion 5 days ago in ED as per daughter.  In ED, pt afebrile, fiO2 96-98% on 40% on ventilator.  Chest Xray w/ opacification of right lung concerning for possible PNA.  Admitted to RCU for further management.      (07 Oct 2024 18:58)    Interval Events:    REVIEW OF SYSTEMS:  [ ] Positive  [ ] All other systems negative  [ ] Unable to assess ROS because ________    Vital Signs Last 24 Hrs  T(C): 36.3 (11-15-24 @ 05:22), Max: 38.2 (11-14-24 @ 12:32)  T(F): 97.3 (11-15-24 @ 05:22), Max: 100.7 (11-14-24 @ 12:32)  HR: 101 (11-15-24 @ 06:11) (73 - 117)  BP: 152/71 (11-15-24 @ 00:02) (140/85 - 152/71)  RR: 23 (11-15-24 @ 05:22) (18 - 23)  SpO2: 100% (11-15-24 @ 06:11) (97% - 100%)    PHYSICAL EXAM:  HEENT:   [ ]Tracheostomy:  [ ]Pupils equal  [ ]No oral lesions  [ ]Abnormal    SKIN  [ ] No Rash  [ ] Abnormal  [ ] pressure    CARDIAC  [ ]Regular  [ ]Abnormal    PULMONARY  [ ]Bilateral Clear Breath Sounds  [ ]Normal Excursion  [ ]Abnormal    GI  [ ]PEG      [ ] +BS		              [ ]Soft, nondistended, nontender	  [ ]Abnormal    MUSCULOSKELETAL                                   [ ]Bedbound                 [ ]Abnormal    [ ]Ambulatory/OOB to chair                           EXTREMITIES                                         [ ]Normal  [ ]Edema                           NEUROLOGIC  [ ] Normal, non focal  [ ] Focal findings:    PSYCHIATRIC  [ ]Alert and appropriate  [ ] Sedated	 [ ]Agitated    :  Mcbride: [ ] Yes, if yes: Date of Placement:                   [  ] No    LINES: Central Lines [ ] Yes, if yes: Date of Placement                                     [  ] No    HOSPITAL MEDICATIONS:  MEDICATIONS  (STANDING):  albuterol/ipratropium for Nebulization 3 milliLiter(s) Nebulizer every 6 hours  artificial  tears Solution 1 Drop(s) Both EYES every 6 hours  ascorbic acid 500 milliGRAM(s) Oral daily  Biotene Dry Mouth Oral Rinse 5 milliLiter(s) Swish and Spit every 6 hours  calcium carbonate 1250 mG  + Vitamin D (OsCal 500 + D) 1 Tablet(s) Oral <User Schedule>  cefepime   IVPB 2000 milliGRAM(s) IV Intermittent every 8 hours  chlorhexidine 0.12% Liquid 15 milliLiter(s) Oral Mucosa every 12 hours  chlorhexidine 4% Liquid 1 Application(s) Topical daily  dextrose 5%. 1000 milliLiter(s) (50 mL/Hr) IV Continuous <Continuous>  dextrose 5%. 1000 milliLiter(s) (100 mL/Hr) IV Continuous <Continuous>  dextrose 5%. 1000 milliLiter(s) (50 mL/Hr) IV Continuous <Continuous>  dextrose 5%. 1000 milliLiter(s) (100 mL/Hr) IV Continuous <Continuous>  dextrose 50% Injectable 25 Gram(s) IV Push once  dextrose 50% Injectable 12.5 Gram(s) IV Push once  dextrose 50% Injectable 25 Gram(s) IV Push once  diclofenac sodium 1% Gel 2 Gram(s) Topical every 6 hours  erythromycin   IVPB 160 milliGRAM(s) IV Intermittent every 8 hours  escitalopram 10 milliGRAM(s) Oral <User Schedule>  fentaNYL   Patch  12 MICROgram(s)/Hr 1 Patch Transdermal every 72 hours  fentaNYL   Patch  25 MICROgram(s)/Hr 1 Patch Transdermal every 72 hours  gabapentin Solution 250 milliGRAM(s) Oral <User Schedule>  glucagon  Injectable 1 milliGRAM(s) IntraMuscular once  glucagon  Injectable 1 milliGRAM(s) IntraMuscular once  hemorrhoidal Ointment 1 Application(s) Rectal at bedtime  influenza  Vaccine (HIGH DOSE) 0.5 milliLiter(s) IntraMuscular once  insulin glargine Injectable (LANTUS) 10 Unit(s) SubCutaneous <User Schedule>  insulin lispro (ADMELOG) corrective regimen sliding scale   SubCutaneous every 6 hours  lactobacillus acidophilus 1 Tablet(s) Oral <User Schedule>  levothyroxine 125 MICROGram(s) Oral <User Schedule>  lidocaine   4% Patch 4 Patch Transdermal every 24 hours  melatonin 12 milliGRAM(s) Oral <User Schedule>  nystatin Powder 1 Application(s) Topical every 8 hours  pantoprazole  Injectable 40 milliGRAM(s) IV Push every 12 hours  predniSONE  Solution 5 milliGRAM(s) Enteral Tube <User Schedule>  QUEtiapine 12.5 milliGRAM(s) Oral <User Schedule>  sodium chloride 1 Gram(s) Oral every 8 hours  sodium chloride 0.65% Nasal 1 Spray(s) Both Nostrils every 6 hours  sodium chloride 0.9%. 500 milliLiter(s) (30 mL/Hr) IV Continuous <Continuous>  sodium chloride 3%  Inhalation 4 milliLiter(s) Inhalation every 6 hours  vancomycin    Solution 125 milliGRAM(s) Oral every 6 hours  witch hazel Pads 1 Application(s) Topical every 12 hours    MEDICATIONS  (PRN):  acetaminophen   Oral Liquid .. 650 milliGRAM(s) Oral every 6 hours PRN Temp greater or equal to 38C (100.4F), Mild Pain (1 - 3)  aluminum hydroxide/magnesium hydroxide/simethicone Suspension 30 milliLiter(s) Enteral Tube every 4 hours PRN Dyspepsia  oxyCODONE    Solution 5 milliGRAM(s) Oral every 6 hours PRN Moderate Pain (4 - 6)  oxyCODONE    Solution 10 milliGRAM(s) Oral every 6 hours PRN Severe Pain (7 - 10)  oxyCODONE    Solution 2.5 milliGRAM(s) Oral every 6 hours PRN Mild Pain (1 - 3)      LABS:                        8.6    10.66 )-----------( 159      ( 15 Nov 2024 06:37 )             28.9     11-15    138  |  108  |  16  ----------------------------<  130[H]  3.3[L]   |  16[L]  |  <0.30[L]    Ca    8.8      15 Nov 2024 06:37  Phos  3.2     11-15  Mg     2.0     11-15    TPro  6.3  /  Alb  2.2[L]  /  TBili  1.0  /  DBili  x   /  AST  20  /  ALT  12  /  AlkPhos  92  11-15      Venous Blood Gas:  11-13 @ 13:35  7.29/30/95/14/98.9  VBG Lactate: 1.2    Mode: AC/ CMV (Assist Control/ Continuous Mandatory Ventilation)  RR (machine): 20  TV (machine): 350  FiO2: 30  PEEP: 5  ITime: 1  MAP: 10  PIP: 27   Patient is a 74y old  Female who presents with a chief complaint of Patient admitted with Hypoxemia and episode of Bright red blood per rectum (15 Nov 2024 06:31)    HPI:  75 yo F PMH T2DM on insulin, HTN, HLD, hypothyroidism, RA on Prednisone, Fibromyalgia, cerebral aneurysm s/p repair, chronic hypercapnic respiratory failure s/p trach (vent dependent) and chronic PEG 2022, recurrent C. diff infection (follows with Dr. Newman), persistent GJ tube leaks now s/p GC fistula repair 8/12 BIBEMS with daughter for respiratory distress, hypoxia to high 80s.  Pt also noted to have rectal bleeding this past week requiring transfusion 5 days ago in ED as per daughter.  In ED, pt afebrile, fiO2 96-98% on 40% on ventilator.  Chest Xray w/ opacification of right lung concerning for possible PNA.  Admitted to RCU for further management.  (07 Oct 2024 18:58)    Interval Events: Tolerated enteral feeds    REVIEW OF SYSTEMS:  [ ] Positive  [ ] All other systems negative  [x] Unable to assess ROS because patient is non-verbal    Vital Signs Last 24 Hrs  T(C): 36.3 (11-15-24 @ 05:22), Max: 38.2 (11-14-24 @ 12:32)  T(F): 97.3 (11-15-24 @ 05:22), Max: 100.7 (11-14-24 @ 12:32)  HR: 101 (11-15-24 @ 06:11) (73 - 117)  BP: 152/71 (11-15-24 @ 00:02) (140/85 - 152/71)  RR: 23 (11-15-24 @ 05:22) (18 - 23)  SpO2: 100% (11-15-24 @ 06:11) (97% - 100%)    PHYSICAL EXAM:    HEENT:   [X] Tracheostomy: #8 distal XLT cuffed Shiley   [X] PERRLA  [ ] No oral lesions  [ ] Abnormal    SKIN  [ ] No Rash  [ ] Abnormal  [X] pressure - sacral unstageable    CARDIAC  [X] Regular  [ ] Abnormal    PULMONARY  [ ] Bilateral Clear Breath Sounds  [ ] Normal Excursion  [X] Abnormal - Diffuses wheezing bilaterally     GI  [X] PEG site covered w/ dry dressing    [X] decreased BS	              [X] Soft, nondistended, nontender	  [X] Abnormal - old PEG site with small opening c/d/i,     MUSCULOSKELETAL                                   [X] Bedbound                 [ ] Abnormal    [ ] Ambulatory/OOB to chair                           EXTREMITIES                                         [ ] Normal  [X] Edema - mod generalized pitting edema                          NEUROLOGIC  [ ] Normal, non focal  [X] Focal findings: lethargic, Minimally responsive to voice, grimacing occasionally, not following commands, no purposeful movements in all extremities, minimally withdrawals to noxious stimuli     PSYCHIATRIC  [x] Lethargic and UTO   [ ] Sedated	 [ ]Agitated    :  Mcbride: [ ] Yes, if yes: Date of Placement:                   [X] No    LINES: Central Lines [ ] Yes, if yes: Date of Placement                                     [X] No    HOSPITAL MEDICATIONS:  MEDICATIONS  (STANDING):  albuterol/ipratropium for Nebulization 3 milliLiter(s) Nebulizer every 6 hours  artificial  tears Solution 1 Drop(s) Both EYES every 6 hours  ascorbic acid 500 milliGRAM(s) Oral daily  Biotene Dry Mouth Oral Rinse 5 milliLiter(s) Swish and Spit every 6 hours  calcium carbonate 1250 mG  + Vitamin D (OsCal 500 + D) 1 Tablet(s) Oral <User Schedule>  cefepime   IVPB 2000 milliGRAM(s) IV Intermittent every 8 hours  chlorhexidine 0.12% Liquid 15 milliLiter(s) Oral Mucosa every 12 hours  chlorhexidine 4% Liquid 1 Application(s) Topical daily  dextrose 5%. 1000 milliLiter(s) (50 mL/Hr) IV Continuous <Continuous>  dextrose 5%. 1000 milliLiter(s) (100 mL/Hr) IV Continuous <Continuous>  dextrose 5%. 1000 milliLiter(s) (50 mL/Hr) IV Continuous <Continuous>  dextrose 5%. 1000 milliLiter(s) (100 mL/Hr) IV Continuous <Continuous>  dextrose 50% Injectable 25 Gram(s) IV Push once  dextrose 50% Injectable 12.5 Gram(s) IV Push once  dextrose 50% Injectable 25 Gram(s) IV Push once  diclofenac sodium 1% Gel 2 Gram(s) Topical every 6 hours  erythromycin   IVPB 160 milliGRAM(s) IV Intermittent every 8 hours  escitalopram 10 milliGRAM(s) Oral <User Schedule>  fentaNYL   Patch  12 MICROgram(s)/Hr 1 Patch Transdermal every 72 hours  fentaNYL   Patch  25 MICROgram(s)/Hr 1 Patch Transdermal every 72 hours  gabapentin Solution 250 milliGRAM(s) Oral <User Schedule>  glucagon  Injectable 1 milliGRAM(s) IntraMuscular once  glucagon  Injectable 1 milliGRAM(s) IntraMuscular once  hemorrhoidal Ointment 1 Application(s) Rectal at bedtime  influenza  Vaccine (HIGH DOSE) 0.5 milliLiter(s) IntraMuscular once  insulin glargine Injectable (LANTUS) 10 Unit(s) SubCutaneous <User Schedule>  insulin lispro (ADMELOG) corrective regimen sliding scale   SubCutaneous every 6 hours  lactobacillus acidophilus 1 Tablet(s) Oral <User Schedule>  levothyroxine 125 MICROGram(s) Oral <User Schedule>  lidocaine   4% Patch 4 Patch Transdermal every 24 hours  melatonin 12 milliGRAM(s) Oral <User Schedule>  nystatin Powder 1 Application(s) Topical every 8 hours  pantoprazole  Injectable 40 milliGRAM(s) IV Push every 12 hours  predniSONE  Solution 5 milliGRAM(s) Enteral Tube <User Schedule>  QUEtiapine 12.5 milliGRAM(s) Oral <User Schedule>  sodium chloride 1 Gram(s) Oral every 8 hours  sodium chloride 0.65% Nasal 1 Spray(s) Both Nostrils every 6 hours  sodium chloride 0.9%. 500 milliLiter(s) (30 mL/Hr) IV Continuous <Continuous>  sodium chloride 3%  Inhalation 4 milliLiter(s) Inhalation every 6 hours  vancomycin    Solution 125 milliGRAM(s) Oral every 6 hours  witch hazel Pads 1 Application(s) Topical every 12 hours    MEDICATIONS  (PRN):  acetaminophen   Oral Liquid .. 650 milliGRAM(s) Oral every 6 hours PRN Temp greater or equal to 38C (100.4F), Mild Pain (1 - 3)  aluminum hydroxide/magnesium hydroxide/simethicone Suspension 30 milliLiter(s) Enteral Tube every 4 hours PRN Dyspepsia  oxyCODONE    Solution 5 milliGRAM(s) Oral every 6 hours PRN Moderate Pain (4 - 6)  oxyCODONE    Solution 10 milliGRAM(s) Oral every 6 hours PRN Severe Pain (7 - 10)  oxyCODONE    Solution 2.5 milliGRAM(s) Oral every 6 hours PRN Mild Pain (1 - 3)      LABS:                        8.6    10.66 )-----------( 159      ( 15 Nov 2024 06:37 )             28.9     11-15    138  |  108  |  16  ----------------------------<  130[H]  3.3[L]   |  16[L]  |  <0.30[L]    Ca    8.8      15 Nov 2024 06:37  Phos  3.2     11-15  Mg     2.0     11-15    TPro  6.3  /  Alb  2.2[L]  /  TBili  1.0  /  DBili  x   /  AST  20  /  ALT  12  /  AlkPhos  92  11-15      Venous Blood Gas:  11-13 @ 13:35  7.29/30/95/14/98.9  VBG Lactate: 1.2    Mode: AC/ CMV (Assist Control/ Continuous Mandatory Ventilation)  RR (machine): 20  TV (machine): 350  FiO2: 30  PEEP: 5  ITime: 1  MAP: 10  PIP: 27

## 2024-11-15 NOTE — PROGRESS NOTE ADULT - ASSESSMENT
73 yo F w/h/o controlled T2DM (A1C 5.8%) on insulin at home. Also HTN, HLD, hypothyroidism, RA on Prednisone, Fibromyalgia, cerebral aneurysm s/p repair, chronic hypercapnic respiratory failure s/p trach (vent dependent) and chronic PEG 2022, recurrent C. diff infection (follows with Dr. 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 S/p antibiotics and s/p GJ tube exchanges on 10/14, s/p PEG replacement 10/21.  Endocrine consulted for Type 2 DM management while on tube feeding.      Tolerating 24 hour continuous tube feeding( Plusmo Peptide 1.5 ([id number]) at 40 ml/hr). Last 24 hour BGs 100s-200s with BGs in 100s today while on Lantus 10 units and moderate correction scale only. On Prednisone 5 mg daily          Home DM medications: Lantus 35 units at HS, Humalog 26 units with # 1 feeding, 22 units with #2 tube feeding, 20 units with #3 tube feeding and  Humalog correction scale (  2 units for -368, 4 units for -250, 6 units for -300, 8units for -350, 10 units for -400, 12 units for -450)   while on [employer] formula 3 cans/day, slow bolus( run at 80 ml/hr total volume 960 ml/day> 1st can around 6:30-8:30, 2nd can around 3 pm, 3rd can around 8-9 pm) plus Banatrol 1/2 packet BID, was increasing to 1 packet BID, plus Kefir 10 oz/day ( divided in multiple times throughout the day).

## 2024-11-15 NOTE — PROGRESS NOTE ADULT - SUBJECTIVE AND OBJECTIVE BOX
Follow Up:  pneumonia    Interval History/ROS: sleepy    Allergies  walnut (Unknown)  metronidazole (Rash)  Lyrica (Unknown)  penicillin (Unknown)  Pineapple (Unknown)  Tagamet (Unknown)  heparin (Unknown)  Pecans (Unknown)  Hazelnut (Unknown)  meropenem (Rash)    ANTIMICROBIALS:  erythromycin   IVPB 160 every 8 hours  vancomycin    Solution 125 every 6 hours      OTHER MEDS:  MEDICATIONS  (STANDING):  acetaminophen   Oral Liquid .. 650 every 6 hours PRN  albuterol/ipratropium for Nebulization 3 every 6 hours  aluminum hydroxide/magnesium hydroxide/simethicone Suspension 30 every 4 hours PRN  dextrose 50% Injectable 25 once  dextrose 50% Injectable 12.5 once  dextrose 50% Injectable 25 once  escitalopram 10 <User Schedule>  fentaNYL   Patch  12 MICROgram(s)/Hr 1 every 72 hours  fentaNYL   Patch  25 MICROgram(s)/Hr 1 every 72 hours  gabapentin Solution 250 <User Schedule>  glucagon  Injectable 1 once  glucagon  Injectable 1 once  influenza  Vaccine (HIGH DOSE) 0.5 once  insulin glargine Injectable (LANTUS) 10 <User Schedule>  insulin lispro (ADMELOG) corrective regimen sliding scale  every 6 hours  levothyroxine 125 <User Schedule>  melatonin 12 <User Schedule>  oxyCODONE    Solution 5 every 6 hours PRN  oxyCODONE    Solution 10 every 6 hours PRN  oxyCODONE    Solution 2.5 every 6 hours PRN  pantoprazole  Injectable 40 every 12 hours  predniSONE  Solution 5 <User Schedule>  QUEtiapine 12.5 <User Schedule>  sodium chloride 3%  Inhalation 4 every 6 hours      Vital Signs Last 24 Hrs  T(C): 37 (15 Nov 2024 12:42), Max: 37 (15 Nov 2024 12:42)  T(F): 98.6 (15 Nov 2024 12:42), Max: 98.6 (15 Nov 2024 12:42)  HR: 89 (15 Nov 2024 14:29) (73 - 101)  BP: 166/74 (15 Nov 2024 12:42) (140/85 - 166/74)  BP(mean): --  RR: 18 (15 Nov 2024 12:42) (18 - 23)  SpO2: 100% (15 Nov 2024 14:29) (100% - 100%)    Parameters below as of 15 Nov 2024 14:29  Patient On (Oxygen Delivery Method): ventilator        PHYSICAL EXAM:  General:  NAD, Non-toxic  Neurology: A&Ox3, nonfocal  Respiratory: Clear to auscultation bilaterally, tach  - blood tinged secretions  CV: RRR, S1S2, no murmurs, rubs or gallops  Abdominal: Soft, Non-tender, non-distended, normal bowel sounds  Extremities: generalized edema  Line Sites: Clear  Skin: No rash                          8.6    10.66 )-----------( 159      ( 15 Nov 2024 06:37 )             28.9   WBC Count: 10.66 (11-15 @ 06:37)  WBC Count: 11.48 (11-14 @ 06:39)  WBC Count: 15.15 (11-13 @ 06:39)  WBC Count: 22.66 (11-12 @ 06:41)  WBC Count: 21.43 (11-11 @ 07:13)        11-15    138  |  108  |  16  ----------------------------<  130[H]  3.3[L]   |  16[L]  |  <0.30[L]    Ca    8.8      15 Nov 2024 06:37  Phos  3.2     11-15  Mg     2.0     11-15    TPro  6.3  /  Alb  2.2[L]  /  TBili  1.0  /  DBili  x   /  AST  20  /  ALT  12  /  AlkPhos  92  11-15    Urinalysis + Microscopic Examination (11.10.24 @ 23:21)   pH Urine: 6.0  Urine Appearance: Clear  Color: Yellow  Specific Gravity: 1.019  Protein, Urine: 30 mg/dL  Glucose Qualitative, Urine: Negative mg/dL  Ketone - Urine: Negative mg/dL  Blood, Urine: Negative  Bilirubin: Negative  Urobilinogen: 0.2 mg/dL  Leukocyte Esterase Concentration: Negative  Nitrite: Negative  Review: Reviewed  White Blood Cell - Urine: 5 /HPF  Red Blood Cell - Urine: 7 /HPF  Bacteria: Negative /HPF  Cast: 4 /LPF  Epithelial Cells: 9 /HP    MICROBIOLOGY:  Trach Asp Tracheal Aspirate  11-10-24   Few Pseudomonas aeruginosa (Carbapenem Resistant)  Commensal vinay consistent with body site  --  Pseudomonas aeruginosa (Carbapenem Resistant)      .Blood BLOOD  11-10-24   No growth at 5 days  --  --      .Sputum Sputum  11-04-24   Moderate Mixed gram negative rods including  Moderate Pseudomonas aeruginosa  Commensal vinay consistent with body site  --  Pseudomonas aeruginosa      Clean Catch Clean Catch (Midstream)  11-03-24   No growth  --  --      .Blood BLOOD  11-03-24   No growth at 5 days  --  --      .Blood BLOOD  11-03-24   No growth at 5 days  --  --      .Blood BLOOD  11-01-24   Growth in anaerobic bottle: Staphylococcus epidermidis  Isolation of Coagulase negative Staphylococcus from single blood culture ets may represent contamination.      Catheterized Catheterized  11-01-24   No growth  --  --      .Blood BLOOD  11-01-24   No growth at 5 days  --  --      Trach Asp Tracheal Aspirate  10-27-24   Moderate Pseudomonas aeruginosa  Moderate Pseudomonas aeruginosa #2  Multiple Morphological Strains  Commensal vinay consistent with body site  --  Pseudomonas aeruginosa  Pseudomonas aeruginosa      .Blood BLOOD  10-27-24   No growth at 5 days  --  --      .Blood BLOOD  10-17-24   No growth at 5 days  --  --      .Blood BLOOD  10-17-24   No growth at 5 days  --  --    RADIOLOGY:  < from: Xray Abdomen 1 View PORTABLE -Urgent (Xray Abdomen 1 View PORTABLE -Urgent .) (11.14.24 @ 01:23) >  Oral contrast and loops of small bowel within the left hemiabdomen. No   obstruction or extravasation.    < end of copied text >      Zion Newman MD; Division of Infectious Disease; Pager: [phone]; nights and weekends: [phone]

## 2024-11-15 NOTE — PROGRESS NOTE ADULT - NS ATTEND AMEND GEN_ALL_CORE FT
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 Found to have Pseudomonas aeruginosa in sputum culture and urine culture with ESBL E. coli s/p course of meropenem. Course complicated by antibiotic-associated diarrhea.  Now with increased TFs output from PEG and diarrhea, concern for aspiration d/t copious output (noted fluid in stomach on bedside US).        - C/W current pain regimen, well controlled. C/W escitalopram and seroquel  - remains lethargic, minimally interactive  - continue lung protective ventilation. Does poorly on PS trials. Continue Duonebs.   - cont PO Vanc for Cdiff, ID favored continuing Cefepime for PNA d/t concern for higher rate of resistance with Ceftazidime (prior cx's w/ PsA sputum and esbl ecoli uti), continue through 11/15/24  - Duonebs + 3% q6h for ACT, not tolerating Volara?, desaturating - will retrial again today with daughter at bedside per her request and see if tolerates  - MRSA screen negative, Procal and WBC improving  - monitor VBG/lactate given HCO3 15, has NAGMA, d/t diarrhea?  - Dr. Vázquez (GI) repositioned PEJ endoscopically 11/13; TFs resumed and tolerating w/o issue, will slowly increase TFs  - continue Erythromycin to help with gut motility; PEG venting  - Anemia multifactorial, hemorrhoids, AOCD. s/p EGD without active bleeding, transfuse for Hb < 7  - cont to monitor FS  , stop IVFs once TFs restarted - continue Lantus 10, will need to restart Admelog once clear tolerating feeds  -  RA, on home prednisone 5 mg daily   - cont wound care to sacrum for decub (also likely moisture related rash areas on back)   - podiatry for toenails per daughter request  - DVT ppx- only scd for now given significant anemia a few days ago

## 2024-11-15 NOTE — PROGRESS NOTE ADULT - PROBLEM SELECTOR PLAN 5
LDL goal <70 due to DM  Pt LDL NA  Order fasting lipid if not recently done  Statin as per primary team. Not getting statin at this time   Manage per primary team   F/u levels as out pt      Contact via [employer] Teams during business hours  To reach covering provider access AMION via sun[employer] tools  For Urgent matters/after-hours/weekends/holidays please page endocrine fellow on call   For nonurgent matters please email [email or web address]    Please note that this patient may be followed by different provider tomorrow.  Notify endocrine 24 hours prior to discharge for final recommendations

## 2024-11-15 NOTE — PROGRESS NOTE ADULT - ASSESSMENT
75 yo woman PMH T2DM on insulin, HTN, HLD, hypothyroidism, RA on Prednisone, Fibromyalgia, cerebral aneurysm s/p repair, chronic hypercapnic respiratory failure s/p trach (vent dependent) and chronic PEG 2022, recurrent C. diff infection , persistent GJ tube leaks now s/p GC fistula repair 8/12  admitted 10/7/24 with resp distress and found to have RML opacity     Antibiotics  Ceftriaxone 10/7  Azithro 10/7  Cefepime 10/7--> 10/12  meropenem 10/15 --> 10/23  IV Vanco 10/17 --> 10/21  Vanco via NGT 10/24; 11/1-->  Metronidazole 11/3 --> 11/7  Cefepime 11/5 --> 11/10; 11/11--> 11/15  Erythromycin 11/11-->      10/10 melena, anemia, blood tx  10/14 EGD for GJ exchange and piror PEG site closure  One benign-appearing, intrinsic moderate stenosis was found in the upper third of the        esophagus. The stenosis was traversed.       The cardia and gastric fundus were normal.       A gastric tube with tip in the jejunum was found in the gastric body. The PEG required        removal because it was leaking. The J tube extension (12F) was removed first. An externally        removable 24 Fr EndoVive Safety gastrostomy tube was lubricated. The guide wire was passed        through the existing G-tube port and snared endoscopically. The endoscope and snare were then        removed, pulling the wire out through the mouth. The g-tube was tied to the guidewire, pulled        through the mouth into the stomach and then pulled out from the stomach through the skin. The        bumper was attached to the gastrostomy tube. The feeding tube was then cut to an appropriate        length. The final position of the gastrostomy tube was confirmed by relook endoscopy, and        skin marking noted to be 3 cm at the external bumper. The final tension and compression of        the abdominal wall by the PEG tube and external bumper were checked and revealed that the        bumper was moderately tight and mildly deforming the skin. The J tube extension (12 F        EndoVive Jejunal feeding tube) was advanced into the stomach. The tip of the J tube had a        loop thread that was captured with a Resolution clip. The thread and the J tube extension        were advanced to the proximal jejunum, and the endoclip along with the tip of the J tube was        attached to the wall securely. The J tube extension was capped, and the tube site was cleaned        and dressed.       A small fistula was found on the anterior wall of the gastric body related to prior G tube        site. Coagulation of tissue near the fistula using argon plasma at 1.2 liters/minute and 35        peraza was successful. To closure the gastrocutaneous fistula, four hemostatic clips were        successfully placed (MR conditional, two 16 mm DuraClip and 2 Mantis clips.       The examined duodenum was normal.    10/14, 10/15 Fever, transient hypoxia post procedure  10/15 ProCalcitonin = 8.48 suggestive of bacterial process; BCx x2 NGTD; Trach: Pseudomonas   - though benign mg stain  10;16 Leukocytosis WBC = 14.26  10/17 fever, hypotension, abd distension, no diarrhea noted this am WBC = 15.02; Rising Procalcitonin = 38.49; Obstructed J tube   10/18  lost IV access re-gained  - CT scan late this afternoon  WBC = 8.68; Rising Procalcitonin >100  10/18 imaging suggests multifocal pneumonia  10/21 improved chest exam, Procalcitonin level considerably decreased, decreased FiO2  - afebrile since 10/18  10/21 UGI endoscopy done  Findings:       The esophagus was normal. A fistula was found on the anterior wall of the gastric body.       There was evidence of a gastrostomy present in the gastric body. The patient was placed in        the supine position. The endoscope was advanced to the jejunum and the prior placed J tube        with loop attached to the wall and the loop thread was cut by endoclip. The J tube and the G        tube were removed. The gastrostomy fistula was utilized and an Avanos 22 F        Gastrostomy/Jejunal tube was advanced. The jejunal tip was advanced through the pylorus and        into the duodenum. The endoscope was then advanced to the duodenum and the loop thread at the        tip of the J tube was captured and advanced to the proximal jejunum. The loop thread was        clipped to wall by a [employer] Resolution clip. The J tube flushed easily and the G        tube decompress the abdomen easily. The internal balloon was insufflated with 10 cc of water        to hold the GJ tube in place. The outside marking was at 3.  10/23  no distress, liquid stools noted  - Meropenem discontinued  GI PCR NEG  10/24  persistent diarrhea  Cdiff NEG; enteral vanco stopped and Immodium provided  10/28 low grade temps, mild leukocytosis  10/30 blood transfusion  11/1  fever  +Cdiff  11/3 continued fever  - +BC Stph epi most likely procurement contaminant,  modest diarrhea reported  - daughter described increased diarrhea in evenings - Pseudomonas airway colonization is long term, no suggestion of pneumonia at present, sacral decub remains superifical will granulated without signs of infection  11/4  - fever, abnormal chest exam though FIO2 still 30%  rectal tube inserted for increased diarrhea  11/5 lethargy, fever, leukocytosis, increased lactate, hyponatremia  11/6 sputum gram stain suggests persistent Pseudomonas colonization  - continued fever, leukocytosis  11/7 unclear if LLL consolidation represents infection. Repeat Procalcitonin  11/7= 2.92 decreased  11/8 continued fevers  +diarrhea, lethargy continues  to complete Cefepime for LLL pneumonia v atelectasis tomorrow 11/9 11/11 increased fever with increased WBC and increased Procalcitonin off Cefepime noted, which was resumed  Concern if feed is refluxing back to the stomach versus J tube now in the stomach.     imaging show G tube in 1st part of duodenum. there are bibasilar L>R consolidations - likely continual reaspiration  11/12 persistent leukocytosis  11/13, decreased height of fever, decreased leukocytosis, Procalcitonin decreased to 52% of the 11/11 value suggesting good therapeutic response. s/p endoscopic replacement of feeding tube into jejunum  - Rectal tube came out  11/15 sleepy  - leukocytosis almost completely resolved        Issues  recurrent Cdiff  cerebral aneurysm s/p repair  chronic hypercapnic respiratory failure s/p trach (vent dependent) and chronic PEG 2022  anemia  - had iron deficiency  Pseudomonas aeruginosa upper airway colonization  (most recent isolate Resist to Cipro/Levo)  T2DM on insulin  10.14.24 -A1C: 5.8%  HTN  HLD  hypothyroidism  RA on Prednisone  Fibromyalgia  debility  sacral ulcer largely healed  malnourished/chronic catabolic state  aspiration      Suggest  Continue vanco 125 q 6 hours via J tube 11/1 --> --> 11/20  Stop Cefepime     discussed with daughter at bedside    - if discharged will pursue REBYOTA at home  discussed with ACP    Please call ID if needed this weekend

## 2024-11-15 NOTE — PROGRESS NOTE ADULT - PROBLEM SELECTOR PLAN 1
- Patient with Chronic Trach at baseline   - Patient presented to ED with Hypoxemia in setting of PNA   - Mechanical vent: Volume ctrl 18/350/5/40%  - Continue Duoneb and Chest PT - Patient with Chronic Trach at baseline   - Patient presented to ED with Hypoxemia in setting of PNA   - Mechanical vent: Volume ctrl 18/350/5/40%  - Continue Duoneb and Chest PT  - continue trials with Volera

## 2024-11-16 NOTE — PROGRESS NOTE ADULT - ASSESSMENT
75 yo F PMH T2DM on insulin, HTN, HLD, hypothyroidism, RA on Prednisone, Fibromyalgia, cerebral aneurysm s/p repair, chronic hypercapnic respiratory failure s/p trach (vent dependent) and chronic PEG 2022, recurrent C. diff infection (follows with Dr. Newman), persistent GJ tube leaks now s/p GC fistula repair 8/12 BIBEMS on 10/7 with daughter for respiratory distress, hypoxia to 88%.  Pt also noted to have rectal bleeding week prior to presentation requiring transfusion 5 days ago in ED as per daughter. Daughter also reports brownish discharge from G-J tube. In ED, pt afebrile, fiO2 96-98% on 40% on ventilator.  Chest X-ray w/ opacification of right lung concerning for possible PNA.  Admitted to RCU for further management. Sputum cxs grew PSA; treated with course of Cefepime. Patient with decreased H+H received 1 unit of PRBCS on 10/10.  10/14 EGD w/ Dr. Rosales for PEGJ exchange and clippings placed for closure of fistula site.  Persistent fevers treated with meropenem and vanc (d/c'd 10/20).  CT A/P without infectious source.  Continued fevers and persistent leukocytosis 11/1 CDiff positive - po vanco started, IV flagyl added (11/3-11/8). Head CT performed on 11/6 due to concern of lethargy no acute intracranial findings were noted; likely related to metabolic encephalopathy. Patient continued to have persistent fevers and was empirically treated with Cefepime (11/5-11/9) for CR PSA in sputum cx. CT C/A/P performed 11/6 c/w consolidative opacity LLL. Evening of 11/10-11/11, WBC 15 --> 21, procal 1 --> 43, lactate 3.2 -- pt also febrile. Infectious w/u sent 11/10 -- BCx negative, trach aspirate cx pending, UA negative. CT CAP on 11/11 showed left greater than right basilar consolidation and patchy ground glass opacity in both lungs consistent with atelectasis and/or pneumonia; Feeding tube coils in the stomach with tip within the first portion of the duodenum, No bowel obstruction. Cefepime restarted 11/11-11/15 with improvement in leukocytosis. Patient underwent Endoscopic adjustment of peg 11/13 with Dr. Rosales. XR tube check performed.     11/16: S/p Endoscopic Peg adjustment 11/13 w/ Dr Rosales. AXR performed. Tube Feeds resumed at 40cc/hr for 24hr. Not tolerating Volera for airway clearance (periods of Desaturation) therefore discontinued. Monitor off rectal tube as without diarrhea. Had low grade fever on 11/16

## 2024-11-16 NOTE — PROVIDER CONTACT NOTE (CHANGE IN STATUS NOTIFICATION) - BACKGROUND
Pt. receiving volara treatment at the bedside being administered by respiratory therapist named Herbert and respiratory therapist named Kayla
vital sign assessment 113/48,  HR 94, RR 18/min

## 2024-11-16 NOTE — PROVIDER CONTACT NOTE (CHANGE IN STATUS NOTIFICATION) - SITUATION
Pt.'s oral temperature 100.4 F
pt. noted with high /96, hr 100, c/o abdominal pain.
Pt.'s found with pox<80% with trach to full ventilator support, and receiving volara treatment at the bedside as per order.

## 2024-11-16 NOTE — PROVIDER CONTACT NOTE (CHANGE IN STATUS NOTIFICATION) - ACTION/TREATMENT ORDERED:
"will order Tylenol ivss"
Pt.'s oral temperature 100.4 F., and vital sign assessment 113/48,  HR 94, RR 18/min, pos>95% on full ventilator support. Mayra Lawson NP notified verbally of all vital signs. Prn acetaminophen ordered and given as per order.
Pt.'s found with pox<80% with trach to full ventilator support, and receiving volara treatment at the bedside as per order. Mayra Lawson NP completed bedside assessment. Repeat Pox >93% with trach to full ventilator support. HR <117, RR 18, blood pressure 160/80.

## 2024-11-16 NOTE — PROGRESS NOTE ADULT - SUBJECTIVE AND OBJECTIVE BOX
Patient is a 74y old  Female who presents with a chief complaint of Patient admitted with Hypoxemia and episode of Bright red blood per rectum (15 Nov 2024 17:34)    HPI:  73 yo F PMH T2DM on insulin, HTN, HLD, hypothyroidism, RA on Prednisone, Fibromyalgia, cerebral aneurysm s/p repair, chronic hypercapnic respiratory failure s/p trach (vent dependent) and chronic PEG 2022, recurrent C. diff infection (follows with Dr. Newman), persistent GJ tube leaks now s/p GC fistula repair 8/12 BIBEMS with daughter for respiratory distress, hypoxia to high 80s.  Pt also noted to have rectal bleeding this past week requiring transfusion 5 days ago in ED as per daughter.  In ED, pt afebrile, fiO2 96-98% on 40% on ventilator.  Chest Xray w/ opacification of right lung concerning for possible PNA.  Admitted to RCU for further management.      (07 Oct 2024 18:58)    Interval Events:    REVIEW OF SYSTEMS:  [ ] Positive  [ ] All other systems negative  [ ] Unable to assess ROS because ________    Vital Signs Last 24 Hrs  T(C): 37.7 (11-16-24 @ 07:23), Max: 37.7 (11-16-24 @ 07:23)  T(F): 99.9 (11-16-24 @ 07:23), Max: 99.9 (11-16-24 @ 07:23)  HR: 113 (11-16-24 @ 06:20) (78 - 117)  BP: 133/61 (11-16-24 @ 07:23) (133/61 - 186/97)  RR: 18 (11-16-24 @ 05:33) (18 - 18)  SpO2: 100% (11-16-24 @ 06:20) (96% - 100%)    PHYSICAL EXAM:  HEENT:   [ ]Tracheostomy:  [ ]Pupils equal  [ ]No oral lesions  [ ]Abnormal    SKIN  [ ] No Rash  [ ] Abnormal  [ ] pressure    CARDIAC  [ ]Regular  [ ]Abnormal    PULMONARY  [ ]Bilateral Clear Breath Sounds  [ ]Normal Excursion  [ ]Abnormal    GI  [ ]PEG      [ ] +BS		              [ ]Soft, nondistended, nontender	  [ ]Abnormal    MUSCULOSKELETAL                                   [ ]Bedbound                 [ ]Abnormal    [ ]Ambulatory/OOB to chair                           EXTREMITIES                                         [ ]Normal  [ ]Edema                           NEUROLOGIC  [ ] Normal, non focal  [ ] Focal findings:    PSYCHIATRIC  [ ]Alert and appropriate  [ ] Sedated	 [ ]Agitated    :  Mcbride: [ ] Yes, if yes: Date of Placement:                   [  ] No    LINES: Central Lines [ ] Yes, if yes: Date of Placement                                     [  ] No    HOSPITAL MEDICATIONS:  MEDICATIONS  (STANDING):  albuterol/ipratropium for Nebulization 3 milliLiter(s) Nebulizer every 6 hours  amLODIPine   Tablet 10 milliGRAM(s) Oral <User Schedule>  artificial  tears Solution 1 Drop(s) Both EYES every 6 hours  ascorbic acid 500 milliGRAM(s) Oral daily  Biotene Dry Mouth Oral Rinse 5 milliLiter(s) Swish and Spit every 6 hours  calcium carbonate 1250 mG  + Vitamin D (OsCal 500 + D) 1 Tablet(s) Oral <User Schedule>  chlorhexidine 0.12% Liquid 15 milliLiter(s) Oral Mucosa every 12 hours  chlorhexidine 4% Liquid 1 Application(s) Topical daily  dextrose 5%. 1000 milliLiter(s) (100 mL/Hr) IV Continuous <Continuous>  dextrose 5%. 1000 milliLiter(s) (50 mL/Hr) IV Continuous <Continuous>  dextrose 5%. 1000 milliLiter(s) (50 mL/Hr) IV Continuous <Continuous>  dextrose 5%. 1000 milliLiter(s) (100 mL/Hr) IV Continuous <Continuous>  dextrose 50% Injectable 25 Gram(s) IV Push once  dextrose 50% Injectable 12.5 Gram(s) IV Push once  dextrose 50% Injectable 25 Gram(s) IV Push once  diclofenac sodium 1% Gel 2 Gram(s) Topical every 6 hours  erythromycin    ethylsuccinate Suspension 40 mG/mL 300 milliGRAM(s) Oral every 8 hours  escitalopram 10 milliGRAM(s) Oral <User Schedule>  fentaNYL   Patch  12 MICROgram(s)/Hr 1 Patch Transdermal every 72 hours  fentaNYL   Patch  25 MICROgram(s)/Hr 1 Patch Transdermal every 72 hours  gabapentin Solution 250 milliGRAM(s) Oral <User Schedule>  glucagon  Injectable 1 milliGRAM(s) IntraMuscular once  glucagon  Injectable 1 milliGRAM(s) IntraMuscular once  hemorrhoidal Ointment 1 Application(s) Rectal at bedtime  hydrALAZINE 25 milliGRAM(s) Oral every 8 hours  influenza  Vaccine (HIGH DOSE) 0.5 milliLiter(s) IntraMuscular once  insulin glargine Injectable (LANTUS) 10 Unit(s) SubCutaneous <User Schedule>  insulin lispro (ADMELOG) corrective regimen sliding scale   SubCutaneous every 6 hours  lactobacillus acidophilus 1 Tablet(s) Oral <User Schedule>  levothyroxine 125 MICROGram(s) Oral <User Schedule>  lidocaine   4% Patch 4 Patch Transdermal every 24 hours  melatonin 12 milliGRAM(s) Oral <User Schedule>  nystatin Powder 1 Application(s) Topical every 8 hours  pantoprazole  Injectable 40 milliGRAM(s) IV Push every 12 hours  predniSONE  Solution 5 milliGRAM(s) Enteral Tube <User Schedule>  QUEtiapine 12.5 milliGRAM(s) Oral <User Schedule>  sodium chloride 1 Gram(s) Oral every 8 hours  sodium chloride 0.65% Nasal 1 Spray(s) Both Nostrils every 6 hours  sodium chloride 3%  Inhalation 4 milliLiter(s) Inhalation every 6 hours  vancomycin    Solution 125 milliGRAM(s) Oral every 6 hours  witch hazel Pads 1 Application(s) Topical every 12 hours    MEDICATIONS  (PRN):  acetaminophen   Oral Liquid .. 650 milliGRAM(s) Oral every 6 hours PRN Temp greater or equal to 38C (100.4F), Mild Pain (1 - 3)  aluminum hydroxide/magnesium hydroxide/simethicone Suspension 30 milliLiter(s) Enteral Tube every 4 hours PRN Dyspepsia      LABS:                        8.6    10.66 )-----------( 159      ( 15 Nov 2024 06:37 )             28.9     11-16    135  |  105  |  16  ----------------------------<  155[H]  4.5   |  14[L]  |  <0.30[L]    Ca    8.6      16 Nov 2024 07:24  Phos  2.0     11-16  Mg     1.6     11-16    TPro  6.5  /  Alb  2.2[L]  /  TBili  1.3[H]  /  DBili  x   /  AST  76[H]  /  ALT  24  /  AlkPhos  166[H]  11-16      Mode: AC/ CMV (Assist Control/ Continuous Mandatory Ventilation)  RR (machine): 18  TV (machine): 350  FiO2: 40  PEEP: 5  ITime: 1  MAP: 12  PIP: 31   Patient is a 74y old  Female who presents with a chief complaint of Patient admitted with Hypoxemia and episode of Bright red blood per rectum (15 Nov 2024 17:34)    HPI:  73 yo F PMH T2DM on insulin, HTN, HLD, hypothyroidism, RA on Prednisone, Fibromyalgia, cerebral aneurysm s/p repair, chronic hypercapnic respiratory failure s/p trach (vent dependent) and chronic PEG 2022, recurrent C. diff infection (follows with Dr. Newman), persistent GJ tube leaks now s/p GC fistula repair 8/12 BIBEMS with daughter for respiratory distress, hypoxia to high 80s.  Pt also noted to have rectal bleeding this past week requiring transfusion 5 days ago in ED as per daughter.  In ED, pt afebrile, fiO2 96-98% on 40% on ventilator.  Chest Xray w/ opacification of right lung concerning for possible PNA.  Admitted to RCU for further management.  (07 Oct 2024 18:58)    Interval Events: No issues overnight    REVIEW OF SYSTEMS:  [ ] Positive  [ ] All other systems negative  [x ] Unable to assess ROS because is somnolent    Vital Signs Last 24 Hrs  T(C): 37.7 (11-16-24 @ 07:23), Max: 37.7 (11-16-24 @ 07:23)  T(F): 99.9 (11-16-24 @ 07:23), Max: 99.9 (11-16-24 @ 07:23)  HR: 113 (11-16-24 @ 06:20) (78 - 117)  BP: 133/61 (11-16-24 @ 07:23) (133/61 - 186/97)  RR: 18 (11-16-24 @ 05:33) (18 - 18)  SpO2: 100% (11-16-24 @ 06:20) (96% - 100%)    PHYSICAL EXAM:    HEENT:   [X] Tracheostomy: #8 distal XLT cuffed Shiley   [X] PERRLA  [ ] No oral lesions  [ ] Abnormal    SKIN  [ ] No Rash  [ ] Abnormal  [X] pressure - sacral unstageable    CARDIAC  [X] Regular  [ ] Abnormal    PULMONARY  [ ] Bilateral Clear Breath Sounds  [ ] Normal Excursion  [X] Abnormal - Diffuses wheezing bilaterally     GI  [X] PEG site covered w/ dry dressing    [X] decreased BS	              [X] Soft, nondistended, nontender	  [X] Abnormal - old PEG site with small opening c/d/i,     MUSCULOSKELETAL                                   [X] Bedbound                 [ ] Abnormal    [ ] Ambulatory/OOB to chair                           EXTREMITIES                                         [ ] Normal  [X] Edema - mod generalized pitting edema                          NEUROLOGIC  [ ] Normal, non focal  [X] Focal findings: lethargic, Minimally responsive to voice, grimacing occasionally, not following commands, no purposeful movements in all extremities, minimally withdrawals to noxious stimuli     PSYCHIATRIC  [x] Lethargic and UTO   [ ] Sedated	 [ ]Agitated    :  Mcbride: [ ] Yes, if yes: Date of Placement:                   [X] No    LINES: Central Lines [ ] Yes, if yes: Date of Placement                                     [X] No    HOSPITAL MEDICATIONS:  MEDICATIONS  (STANDING):  albuterol/ipratropium for Nebulization 3 milliLiter(s) Nebulizer every 6 hours  amLODIPine   Tablet 10 milliGRAM(s) Oral <User Schedule>  artificial  tears Solution 1 Drop(s) Both EYES every 6 hours  ascorbic acid 500 milliGRAM(s) Oral daily  Biotene Dry Mouth Oral Rinse 5 milliLiter(s) Swish and Spit every 6 hours  calcium carbonate 1250 mG  + Vitamin D (OsCal 500 + D) 1 Tablet(s) Oral <User Schedule>  chlorhexidine 0.12% Liquid 15 milliLiter(s) Oral Mucosa every 12 hours  chlorhexidine 4% Liquid 1 Application(s) Topical daily  dextrose 5%. 1000 milliLiter(s) (100 mL/Hr) IV Continuous <Continuous>  dextrose 5%. 1000 milliLiter(s) (50 mL/Hr) IV Continuous <Continuous>  dextrose 5%. 1000 milliLiter(s) (50 mL/Hr) IV Continuous <Continuous>  dextrose 5%. 1000 milliLiter(s) (100 mL/Hr) IV Continuous <Continuous>  dextrose 50% Injectable 25 Gram(s) IV Push once  dextrose 50% Injectable 12.5 Gram(s) IV Push once  dextrose 50% Injectable 25 Gram(s) IV Push once  diclofenac sodium 1% Gel 2 Gram(s) Topical every 6 hours  erythromycin    ethylsuccinate Suspension 40 mG/mL 300 milliGRAM(s) Oral every 8 hours  escitalopram 10 milliGRAM(s) Oral <User Schedule>  fentaNYL   Patch  12 MICROgram(s)/Hr 1 Patch Transdermal every 72 hours  fentaNYL   Patch  25 MICROgram(s)/Hr 1 Patch Transdermal every 72 hours  gabapentin Solution 250 milliGRAM(s) Oral <User Schedule>  glucagon  Injectable 1 milliGRAM(s) IntraMuscular once  glucagon  Injectable 1 milliGRAM(s) IntraMuscular once  hemorrhoidal Ointment 1 Application(s) Rectal at bedtime  hydrALAZINE 25 milliGRAM(s) Oral every 8 hours  influenza  Vaccine (HIGH DOSE) 0.5 milliLiter(s) IntraMuscular once  insulin glargine Injectable (LANTUS) 10 Unit(s) SubCutaneous <User Schedule>  insulin lispro (ADMELOG) corrective regimen sliding scale   SubCutaneous every 6 hours  lactobacillus acidophilus 1 Tablet(s) Oral <User Schedule>  levothyroxine 125 MICROGram(s) Oral <User Schedule>  lidocaine   4% Patch 4 Patch Transdermal every 24 hours  melatonin 12 milliGRAM(s) Oral <User Schedule>  nystatin Powder 1 Application(s) Topical every 8 hours  pantoprazole  Injectable 40 milliGRAM(s) IV Push every 12 hours  predniSONE  Solution 5 milliGRAM(s) Enteral Tube <User Schedule>  QUEtiapine 12.5 milliGRAM(s) Oral <User Schedule>  sodium chloride 1 Gram(s) Oral every 8 hours  sodium chloride 0.65% Nasal 1 Spray(s) Both Nostrils every 6 hours  sodium chloride 3%  Inhalation 4 milliLiter(s) Inhalation every 6 hours  vancomycin    Solution 125 milliGRAM(s) Oral every 6 hours  witch hazel Pads 1 Application(s) Topical every 12 hours    MEDICATIONS  (PRN):  acetaminophen   Oral Liquid .. 650 milliGRAM(s) Oral every 6 hours PRN Temp greater or equal to 38C (100.4F), Mild Pain (1 - 3)  aluminum hydroxide/magnesium hydroxide/simethicone Suspension 30 milliLiter(s) Enteral Tube every 4 hours PRN Dyspepsia      LABS:                        8.6    10.66 )-----------( 159      ( 15 Nov 2024 06:37 )             28.9     11-16    135  |  105  |  16  ----------------------------<  155[H]  4.5   |  14[L]  |  <0.30[L]    Ca    8.6      16 Nov 2024 07:24  Phos  2.0     11-16  Mg     1.6     11-16    TPro  6.5  /  Alb  2.2[L]  /  TBili  1.3[H]  /  DBili  x   /  AST  76[H]  /  ALT  24  /  AlkPhos  166[H]  11-16      Mode: AC/ CMV (Assist Control/ Continuous Mandatory Ventilation)  RR (machine): 18  TV (machine): 350  FiO2: 40  PEEP: 5  ITime: 1  MAP: 12  PIP: 31

## 2024-11-16 NOTE — PROGRESS NOTE ADULT - PROBLEM SELECTOR PLAN 2
[] PNA / Intermittent fevers  - Sputum cx 10/8: PSA  - CXR 10/7: Opacification of the right middle lobe may represent pneumonia versus atelectasis  - CT A/P 10/18: ? Multifocal pneumonia  - Completed Initial course of cefepime on 10/12  - Sputum 11/4: PSA  - Repeat CT C/A/P 11/6: Consolidative opacity LLL  - Cefepime restarted 11/5-->11/15; in setting of recurrent fevers  - Febrile morning 11/10 and overnight 11/11 -- ofirmev given, pan cultures sent on 11/10 -- procal 1.11 --> 43; WBC 15.65 --> 21.43; increased output from J tube - possible aspiration  - Infectious w/u sent 11/10 -- BCx negative, trach aspirate cx pending, UA negative  - CT CAP on 11/11 showed left greater than right basilar consolidation and patchy groundglass opacity in both lungs consistent with atelectasis and/or pneumonia.   - per ID -- restart cefepime  - re-sent MRSA/MSSA PCR 11/12 (prior from 10/10)    [] C.Diff   - Stool C.diff: + 11/1   - S/p Flagyl 11/3-11/8  - Continue enteral Vanco  - + RT remains in place; monitor output

## 2024-11-16 NOTE — PROGRESS NOTE ADULT - PROBLEM SELECTOR PLAN 1
- Patient with Chronic Trach at baseline   - Patient presented to ED with Hypoxemia in setting of PNA   - Mechanical vent: Volume ctrl 18/350/5/40%  - Continue Duoneb and Chest PT  - continue trials with Volera

## 2024-11-16 NOTE — PROGRESS NOTE ADULT - NS ATTEND AMEND GEN_ALL_CORE FT
75 yo F w/ PMH DM2 (insulin-dependent), HTN, HLD, hypothyroidism, RA on Prednisone, fibromyalgia, cerebral aneurysm s/p repair, chronic hypoxemia and hypercapnic respiratory failure s/p trach, vent-dependent, recurrent C diff infection, oropharyngeal dysphagia s/p G-J tube with persistent GJ tube leaks now s/p GC fistula repair 8/12, and recent presentation 5 days PTA for rectal bleeding requiring transfusion in the ED who now presents with acute hypoxemic respiratory distress 2/2 RML PNA, admitted to the RCU for further management. Found to have Pseudomonas aeruginosa in sputum culture and urine culture with ESBL E. coli s/p course of meropenem. Course complicated by antibiotic-associated diarrhea.  Now with increased TFs output from PEG and diarrhea, concern for aspiration d/t copious output (noted fluid in stomach on bedside US).        - C/W current pain regimen, well controlled. C/W escitalopram and seroquel  - remains lethargic, minimally interactive  - continue lung protective ventilation. Does poorly on PS trials. Continue Duonebs.   - cont PO Vanc for Cdiff, ID favored continuing Cefepime for PNA d/t concern for higher rate of resistance with Ceftazidime (prior cx's w/ PsA sputum and esbl ecoli uti), continue through 11/15/24  - Duonebs + 3% q6h for ACT, not tolerating Volara?, desaturating - will retrial again today with daughter at bedside per her request and see if tolerates  - MRSA screen negative, Procal and WBC improving  - monitor VBG/lactate given HCO3 15, has NAGMA, d/t diarrhea?  - Dr. Vázquez (GI) repositioned PEJ endoscopically 11/13; TFs resumed and tolerating w/o issue, will slowly increase TFs  - continue Erythromycin to help with gut motility; PEG venting  - Anemia multifactorial, hemorrhoids, AOCD. s/p EGD without active bleeding, transfuse for Hb < 7  - cont to monitor FS  , stop IVFs once TFs restarted - continue Lantus 10, will need to restart Admelog once clear tolerating feeds  -  RA, on home prednisone 5 mg daily   - cont wound care to sacrum for decub (also likely moisture related rash areas on back)   - podiatry for toenails per daughter request  - DVT ppx- only scd for now given significant anemia a few days ago.

## 2024-11-17 NOTE — PROVIDER CONTACT NOTE (OTHER) - ACTION/TREATMENT ORDERED:
NP aware, 15mg of midodrine given, sodium chloride bolus 250mL, and albumin 100mL given. NP aware, 15mg of midodrine, sodium chloride bolus 250mL, and albumin 100mL given.

## 2024-11-17 NOTE — PROGRESS NOTE ADULT - SUBJECTIVE AND OBJECTIVE BOX
Patient is a 74y old  Female who presents with a chief complaint of Patient admitted with Hypoxemia and episode of Bright red blood per rectum (16 Nov 2024 09:01)    HPI:  75 yo F PMH T2DM on insulin, HTN, HLD, hypothyroidism, RA on Prednisone, Fibromyalgia, cerebral aneurysm s/p repair, chronic hypercapnic respiratory failure s/p trach (vent dependent) and chronic PEG 2022, recurrent C. diff infection (follows with Dr. Newman), persistent GJ tube leaks now s/p GC fistula repair 8/12 BIBEMS with daughter for respiratory distress, hypoxia to high 80s.  Pt also noted to have rectal bleeding this past week requiring transfusion 5 days ago in ED as per daughter.  In ED, pt afebrile, fiO2 96-98% on 40% on ventilator.  Chest Xray w/ opacification of right lung concerning for possible PNA.  Admitted to RCU for further management.  (07 Oct 2024 18:58)    Interval Events:    REVIEW OF SYSTEMS:  [ ] Positive  [ ] All other systems negative  [ ] Unable to assess ROS because ________    Vital Signs Last 24 Hrs  T(C): 37.1 (11-17-24 @ 05:00), Max: 37.8 (11-16-24 @ 12:29)  T(F): 98.8 (11-17-24 @ 05:00), Max: 100 (11-16-24 @ 12:29)  HR: 100 (11-17-24 @ 11:27) (75 - 124)  BP: 105/57 (11-17-24 @ 05:00) (105/57 - 118/54)  RR: 18 (11-17-24 @ 05:00) (18 - 18)  SpO2: 99% (11-17-24 @ 11:27) (95% - 100%)    PHYSICAL EXAM:  HEENT:   [ ]Tracheostomy:  [ ]Pupils equal  [ ]No oral lesions  [ ]Abnormal    SKIN  [ ] No Rash  [ ] Abnormal  [ ] pressure    CARDIAC  [ ]Regular  [ ]Abnormal    PULMONARY  [ ]Bilateral Clear Breath Sounds  [ ]Normal Excursion  [ ]Abnormal    GI  [ ]PEG      [ ] +BS		              [ ]Soft, nondistended, nontender	  [ ]Abnormal    MUSCULOSKELETAL                                   [ ]Bedbound                 [ ]Abnormal    [ ]Ambulatory/OOB to chair                           EXTREMITIES                                         [ ]Normal  [ ]Edema                           NEUROLOGIC  [ ] Normal, non focal  [ ] Focal findings:    PSYCHIATRIC  [ ]Alert and appropriate  [ ] Sedated	 [ ]Agitated    :  Mcbride: [ ] Yes, if yes: Date of Placement:                   [  ] No    LINES: Central Lines [ ] Yes, if yes: Date of Placement                                     [  ] No    HOSPITAL MEDICATIONS:  MEDICATIONS  (STANDING):  albumin human 25% IVPB 100 milliLiter(s) IV Intermittent once  albuterol/ipratropium for Nebulization 3 milliLiter(s) Nebulizer every 6 hours  amLODIPine   Tablet 10 milliGRAM(s) Oral <User Schedule>  artificial  tears Solution 1 Drop(s) Both EYES every 6 hours  ascorbic acid 500 milliGRAM(s) Oral daily  Biotene Dry Mouth Oral Rinse 5 milliLiter(s) Swish and Spit every 6 hours  calcium carbonate 1250 mG  + Vitamin D (OsCal 500 + D) 1 Tablet(s) Oral <User Schedule>  chlorhexidine 0.12% Liquid 15 milliLiter(s) Oral Mucosa every 12 hours  chlorhexidine 4% Liquid 1 Application(s) Topical daily  dextrose 5%. 1000 milliLiter(s) (100 mL/Hr) IV Continuous <Continuous>  dextrose 5%. 1000 milliLiter(s) (50 mL/Hr) IV Continuous <Continuous>  dextrose 5%. 1000 milliLiter(s) (50 mL/Hr) IV Continuous <Continuous>  dextrose 5%. 1000 milliLiter(s) (100 mL/Hr) IV Continuous <Continuous>  dextrose 50% Injectable 25 Gram(s) IV Push once  dextrose 50% Injectable 12.5 Gram(s) IV Push once  dextrose 50% Injectable 25 Gram(s) IV Push once  diclofenac sodium 1% Gel 2 Gram(s) Topical every 6 hours  erythromycin    ethylsuccinate Suspension 40 mG/mL 300 milliGRAM(s) Oral every 8 hours  escitalopram 10 milliGRAM(s) Oral <User Schedule>  fentaNYL   Patch  12 MICROgram(s)/Hr 1 Patch Transdermal every 72 hours  fentaNYL   Patch  25 MICROgram(s)/Hr 1 Patch Transdermal every 72 hours  gabapentin Solution 250 milliGRAM(s) Oral <User Schedule>  glucagon  Injectable 1 milliGRAM(s) IntraMuscular once  glucagon  Injectable 1 milliGRAM(s) IntraMuscular once  hemorrhoidal Ointment 1 Application(s) Rectal at bedtime  influenza  Vaccine (HIGH DOSE) 0.5 milliLiter(s) IntraMuscular once  insulin glargine Injectable (LANTUS) 10 Unit(s) SubCutaneous <User Schedule>  insulin lispro (ADMELOG) corrective regimen sliding scale   SubCutaneous every 6 hours  lactobacillus acidophilus 1 Tablet(s) Oral <User Schedule>  levothyroxine 125 MICROGram(s) Oral <User Schedule>  lidocaine   4% Patch 4 Patch Transdermal every 24 hours  melatonin 12 milliGRAM(s) Oral <User Schedule>  nystatin Powder 1 Application(s) Topical every 8 hours  pantoprazole  Injectable 40 milliGRAM(s) IV Push every 12 hours  potassium chloride  10 mEq/100 mL IVPB 10 milliEquivalent(s) IV Intermittent every 1 hour  potassium phosphate IVPB 15 milliMole(s) IV Intermittent once  predniSONE  Solution 5 milliGRAM(s) Enteral Tube <User Schedule>  QUEtiapine 12.5 milliGRAM(s) Oral <User Schedule>  sodium chloride 1 Gram(s) Oral every 8 hours  sodium chloride 0.65% Nasal 1 Spray(s) Both Nostrils every 6 hours  sodium chloride 3%  Inhalation 4 milliLiter(s) Inhalation every 6 hours  vancomycin    Solution 125 milliGRAM(s) Oral every 6 hours  witch hazel Pads 1 Application(s) Topical every 12 hours    MEDICATIONS  (PRN):  acetaminophen   Oral Liquid .. 650 milliGRAM(s) Oral every 6 hours PRN Temp greater or equal to 38C (100.4F), Mild Pain (1 - 3)  aluminum hydroxide/magnesium hydroxide/simethicone Suspension 30 milliLiter(s) Enteral Tube every 4 hours PRN Dyspepsia      LABS:                        9.5    15.46 )-----------( 122      ( 17 Nov 2024 06:30 )             33.1     11-17    138  |  105  |  23  ----------------------------<  133[H]  3.1[L]   |  13[L]  |  0.31[L]    Ca    8.6      17 Nov 2024 06:30  Phos  1.9     11-17  Mg     1.5     11-17    TPro  5.7[L]  /  Alb  1.9[L]  /  TBili  1.2  /  DBili  x   /  AST  33  /  ALT  18  /  AlkPhos  161[H]  11-17      Mode: AC/ CMV (Assist Control/ Continuous Mandatory Ventilation)  RR (machine): 18  TV (machine): 350  FiO2: 40  PEEP: 5  ITime: 1  MAP: 11  PIP: 33   Patient is a 74y old  Female who presents with a chief complaint of Patient admitted with Hypoxemia and episode of Bright red blood per rectum (16 Nov 2024 09:01)    HPI:  73 yo F PMH T2DM on insulin, HTN, HLD, hypothyroidism, RA on Prednisone, Fibromyalgia, cerebral aneurysm s/p repair, chronic hypercapnic respiratory failure s/p trach (vent dependent) and chronic PEG 2022, recurrent C. diff infection (follows with Dr. Newman), persistent GJ tube leaks now s/p GC fistula repair 8/12 BIBEMS with daughter for respiratory distress, hypoxia to high 80s.  Pt also noted to have rectal bleeding this past week requiring transfusion 5 days ago in ED as per daughter.  In ED, pt afebrile, fiO2 96-98% on 40% on ventilator.  Chest Xray w/ opacification of right lung concerning for possible PNA.  Admitted to RCU for further management.  (07 Oct 2024 18:58)    Interval Events: Patient bit her lip,    REVIEW OF SYSTEMS:  [ ] Positive  [ ] All other systems negative  [x ] Unable to assess ROS because patient it somnolent    Vital Signs Last 24 Hrs  T(C): 37.1 (11-17-24 @ 05:00), Max: 37.8 (11-16-24 @ 12:29)  T(F): 98.8 (11-17-24 @ 05:00), Max: 100 (11-16-24 @ 12:29)  HR: 100 (11-17-24 @ 11:27) (75 - 124)  BP: 105/57 (11-17-24 @ 05:00) (105/57 - 118/54)  RR: 18 (11-17-24 @ 05:00) (18 - 18)  SpO2: 99% (11-17-24 @ 11:27) (95% - 100%)    PHYSICAL EXAM:    HEENT:   [X] Tracheostomy: #8 distal XLT cuffed Shiley   [X] PERRLA  [ ] No oral lesions  [ ] Abnormal    SKIN  [ ] No Rash  [ ] Abnormal  [X] pressure - sacral unstageable    CARDIAC  [X] Regular  [ ] Abnormal    PULMONARY  [ ] Bilateral Clear Breath Sounds  [ ] Normal Excursion  [X] Abnormal - Diffuses wheezing bilaterally     GI  [X] PEG site covered w/ dry dressing    [X] decreased BS	              [X] Soft, nondistended, nontender	  [X] Abnormal - old PEG site with small opening c/d/i,     MUSCULOSKELETAL                                   [X] Bedbound                 [ ] Abnormal    [ ] Ambulatory/OOB to chair                           EXTREMITIES                                         [ ] Normal  [X] Edema - mod generalized pitting edema                          NEUROLOGIC  [ ] Normal, non focal  [X] Focal findings: lethargic, Minimally responsive to voice, grimacing occasionally, not following commands, no purposeful movements in all extremities, minimally withdrawals to noxious stimuli.    PSYCHIATRIC  [x] Lethargic  [ ] Sedated	 [ ]Agitated    :  Mcbride: [ ] Yes, if yes: Date of Placement:                   [X] No    LINES: Central Lines [ ] Yes, if yes: Date of Placement                                     [X] No    HOSPITAL MEDICATIONS:  MEDICATIONS  (STANDING):  albumin human 25% IVPB 100 milliLiter(s) IV Intermittent once  albuterol/ipratropium for Nebulization 3 milliLiter(s) Nebulizer every 6 hours  amLODIPine   Tablet 10 milliGRAM(s) Oral <User Schedule>  artificial  tears Solution 1 Drop(s) Both EYES every 6 hours  ascorbic acid 500 milliGRAM(s) Oral daily  Biotene Dry Mouth Oral Rinse 5 milliLiter(s) Swish and Spit every 6 hours  calcium carbonate 1250 mG  + Vitamin D (OsCal 500 + D) 1 Tablet(s) Oral <User Schedule>  chlorhexidine 0.12% Liquid 15 milliLiter(s) Oral Mucosa every 12 hours  chlorhexidine 4% Liquid 1 Application(s) Topical daily  dextrose 5%. 1000 milliLiter(s) (100 mL/Hr) IV Continuous <Continuous>  dextrose 5%. 1000 milliLiter(s) (50 mL/Hr) IV Continuous <Continuous>  dextrose 5%. 1000 milliLiter(s) (50 mL/Hr) IV Continuous <Continuous>  dextrose 5%. 1000 milliLiter(s) (100 mL/Hr) IV Continuous <Continuous>  dextrose 50% Injectable 25 Gram(s) IV Push once  dextrose 50% Injectable 12.5 Gram(s) IV Push once  dextrose 50% Injectable 25 Gram(s) IV Push once  diclofenac sodium 1% Gel 2 Gram(s) Topical every 6 hours  erythromycin    ethylsuccinate Suspension 40 mG/mL 300 milliGRAM(s) Oral every 8 hours  escitalopram 10 milliGRAM(s) Oral <User Schedule>  fentaNYL   Patch  12 MICROgram(s)/Hr 1 Patch Transdermal every 72 hours  fentaNYL   Patch  25 MICROgram(s)/Hr 1 Patch Transdermal every 72 hours  gabapentin Solution 250 milliGRAM(s) Oral <User Schedule>  glucagon  Injectable 1 milliGRAM(s) IntraMuscular once  glucagon  Injectable 1 milliGRAM(s) IntraMuscular once  hemorrhoidal Ointment 1 Application(s) Rectal at bedtime  influenza  Vaccine (HIGH DOSE) 0.5 milliLiter(s) IntraMuscular once  insulin glargine Injectable (LANTUS) 10 Unit(s) SubCutaneous <User Schedule>  insulin lispro (ADMELOG) corrective regimen sliding scale   SubCutaneous every 6 hours  lactobacillus acidophilus 1 Tablet(s) Oral <User Schedule>  levothyroxine 125 MICROGram(s) Oral <User Schedule>  lidocaine   4% Patch 4 Patch Transdermal every 24 hours  melatonin 12 milliGRAM(s) Oral <User Schedule>  nystatin Powder 1 Application(s) Topical every 8 hours  pantoprazole  Injectable 40 milliGRAM(s) IV Push every 12 hours  potassium chloride  10 mEq/100 mL IVPB 10 milliEquivalent(s) IV Intermittent every 1 hour  potassium phosphate IVPB 15 milliMole(s) IV Intermittent once  predniSONE  Solution 5 milliGRAM(s) Enteral Tube <User Schedule>  QUEtiapine 12.5 milliGRAM(s) Oral <User Schedule>  sodium chloride 1 Gram(s) Oral every 8 hours  sodium chloride 0.65% Nasal 1 Spray(s) Both Nostrils every 6 hours  sodium chloride 3%  Inhalation 4 milliLiter(s) Inhalation every 6 hours  vancomycin    Solution 125 milliGRAM(s) Oral every 6 hours  witch hazel Pads 1 Application(s) Topical every 12 hours    MEDICATIONS  (PRN):  acetaminophen   Oral Liquid .. 650 milliGRAM(s) Oral every 6 hours PRN Temp greater or equal to 38C (100.4F), Mild Pain (1 - 3)  aluminum hydroxide/magnesium hydroxide/simethicone Suspension 30 milliLiter(s) Enteral Tube every 4 hours PRN Dyspepsia      LABS:                        9.5    15.46 )-----------( 122      ( 17 Nov 2024 06:30 )             33.1     11-17    138  |  105  |  23  ----------------------------<  133[H]  3.1[L]   |  13[L]  |  0.31[L]    Ca    8.6      17 Nov 2024 06:30  Phos  1.9     11-17  Mg     1.5     11-17    TPro  5.7[L]  /  Alb  1.9[L]  /  TBili  1.2  /  DBili  x   /  AST  33  /  ALT  18  /  AlkPhos  161[H]  11-17      Mode: AC/ CMV (Assist Control/ Continuous Mandatory Ventilation)  RR (machine): 18  TV (machine): 350  FiO2: 40  PEEP: 5  ITime: 1  MAP: 11  PIP: 33

## 2024-11-17 NOTE — PROVIDER CONTACT NOTE (OTHER) - ASSESSMENT
pt BP 79/41, pt BP 79/41, , temperature 98.6, RR 18 and O2 99%. pt showing no signs and symptoms of distress.

## 2024-11-17 NOTE — PROGRESS NOTE ADULT - PROBLEM SELECTOR PLAN 4
- Amolodipine d/c'd 10/10 in setting of borderline bp   - Monitor BP - Amolodipine d/c'd 10/10 in setting of borderline bp   - Monitor BP  - Restarted Norvasc and hydralazine on 11/16 for 's  - DC'd hydral on 11/17 for hypotension

## 2024-11-17 NOTE — PROGRESS NOTE ADULT - NS ATTEND AMEND GEN_ALL_CORE FT
-------75 yo F w/ PMH DM2 (insulin-dependent), HTN, HLD, hypothyroidism, RA on Prednisone, fibromyalgia, cerebral aneurysm s/p repair, chronic hypoxemia and hypercapnic respiratory failure s/p trach, vent-dependent, recurrent C diff infection, oropharyngeal dysphagia s/p G-J tube with persistent GJ tube leaks now s/p GC fistula repair 8/12, and recent presentation 5 days PTA for rectal bleeding requiring transfusion in the ED who now presents with acute hypoxemic respiratory distress 2/2 RML PNA, admitted to the RCU for further management. Found to have Pseudomonas aeruginosa in sputum culture and urine culture with ESBL E. coli s/p course of meropenem. Course complicated by antibiotic-associated diarrhea.  Now with increased TFs output from PEG and diarrhea, concern for aspiration d/t copious output (noted fluid in stomach on bedside US).        - C/W current pain regimen, well controlled. C/W escitalopram and seroquel  - remains lethargic, minimally interactive  - continue lung protective ventilation. Does poorly on PS trials. Continue Duonebs.   - cont PO Vanc for Cdiff, ID favored continuing Cefepime for PNA d/t concern for higher rate of resistance with Ceftazidime (prior cx's w/ PsA sputum and esbl ecoli uti), continue through 11/15/24  - Duonebs + 3% q6h for ACT, not tolerating Volara?, desaturating - will retrial again today with daughter at bedside per her request and see if tolerates  - MRSA screen negative, Procal and WBC improving  - monitor VBG/lactate given HCO3 15, has NAGMA, d/t diarrhea?  - Dr. Vázquez (GI) repositioned PEJ endoscopically 11/13; TFs resumed and tolerating w/o issue, will slowly increase TFs  - continue Erythromycin to help with gut motility; PEG venting  - Anemia multifactorial, hemorrhoids, AOCD. s/p EGD without active bleeding, transfuse for Hb < 7  - cont to monitor FS  , stop IVFs once TFs restarted - continue Lantus 10, will need to restart Admelog once clear tolerating feeds  -  RA, on home prednisone 5 mg daily   - cont wound care to sacrum for decub (also likely moisture related rash areas on back)   - podiatry for toenails per daughter request  - DVT ppx- only scd for now given significant anemia a few days ago.

## 2024-11-17 NOTE — PROGRESS NOTE ADULT - PROBLEM SELECTOR PLAN 2
[] PNA / Intermittent fevers  - Sputum cx 10/8: PSA  - CXR 10/7: Opacification of the right middle lobe may represent pneumonia versus atelectasis  - CT A/P 10/18: ? Multifocal pneumonia  - Completed Initial course of cefepime on 10/12  - Sputum 11/4: PSA  - Repeat CT C/A/P 11/6: Consolidative opacity LLL  - Cefepime restarted 11/5-->11/15; in setting of recurrent fevers  - Febrile morning 11/10 and overnight 11/11 -- ofirmev given, pan cultures sent on 11/10 -- procal 1.11 --> 43; WBC 15.65 --> 21.43; increased output from J tube - possible aspiration  - Infectious w/u sent 11/10 -- BCx negative, trach aspirate cx pending, UA negative  - CT CAP on 11/11 showed left greater than right basilar consolidation and patchy groundglass opacity in both lungs consistent with atelectasis and/or pneumonia.   - per ID -- restart cefepime  - re-sent MRSA/MSSA PCR 11/12 (prior from 10/10)    [] C.Diff   - Stool C.diff: + 11/1   - S/p Flagyl 11/3-11/8  - Continue enteral Vanco  - + RT remains in place; monitor output [] PNA / Intermittent fevers  - Sputum cx 10/8: PSA  - CXR 10/7: Opacification of the right middle lobe may represent pneumonia versus atelectasis  - CT A/P 10/18: ? Multifocal pneumonia  - Completed Initial course of cefepime on 10/12  - Sputum 11/4: PSA  - Repeat CT C/A/P 11/6: Consolidative opacity LLL  - Cefepime restarted 11/5-->11/15; in setting of recurrent fevers  - Febrile morning 11/10 and overnight 11/11 -- Ofirmev given, pan cultures sent on 11/10 -- procal 1.11 --> 43; WBC 15.65 --> 21.43; increased output from J tube - possible aspiration  - Infectious w/u sent 11/10 -- BCx negative, trach aspirate cx pending, UA negative  - CT CAP on 11/11 showed left greater than right basilar consolidation and patchy groundglass opacity in both lungs consistent with atelectasis and/or pneumonia.   - per ID -- restart cefepime  - re-sent MRSA/MSSA PCR 11/12 (prior from 10/10)    [] C.Diff   - Stool C.diff: + 11/1   - S/p Flagyl 11/3-11/8  - Continue enteral Vanco  - f/u with ID regrading length of Vanco

## 2024-11-17 NOTE — PROGRESS NOTE ADULT - ASSESSMENT
73 yo F PMH T2DM on insulin, HTN, HLD, hypothyroidism, RA on Prednisone, Fibromyalgia, cerebral aneurysm s/p repair, chronic hypercapnic respiratory failure s/p trach (vent dependent) and chronic PEG 2022, recurrent C. diff infection (follows with Dr. Newman), persistent GJ tube leaks now s/p GC fistula repair 8/12 BIBEMS on 10/7 with daughter for respiratory distress, hypoxia to 88%.  Pt also noted to have rectal bleeding week prior to presentation requiring transfusion 5 days ago in ED as per daughter. Daughter also reports brownish discharge from G-J tube. In ED, pt afebrile, fiO2 96-98% on 40% on ventilator.  Chest X-ray w/ opacification of right lung concerning for possible PNA.  Admitted to RCU for further management. Sputum cxs grew PSA; treated with course of Cefepime. Patient with decreased H+H received 1 unit of PRBCS on 10/10.  10/14 EGD w/ Dr. Rosales for PEGJ exchange and clippings placed for closure of fistula site.  Persistent fevers treated with meropenem and vanc (d/c'd 10/20).  CT A/P without infectious source.  Continued fevers and persistent leukocytosis 11/1 CDiff positive - po vanco started, IV flagyl added (11/3-11/8). Head CT performed on 11/6 due to concern of lethargy no acute intracranial findings were noted; likely related to metabolic encephalopathy. Patient continued to have persistent fevers and was empirically treated with Cefepime (11/5-11/9) for CR PSA in sputum cx. CT C/A/P performed 11/6 c/w consolidative opacity LLL. Evening of 11/10-11/11, WBC 15 --> 21, procal 1 --> 43, lactate 3.2 -- pt also febrile. Infectious w/u sent 11/10 -- BCx negative, trach aspirate cx pending, UA negative. CT CAP on 11/11 showed left greater than right basilar consolidation and patchy ground glass opacity in both lungs consistent with atelectasis and/or pneumonia; Feeding tube coils in the stomach with tip within the first portion of the duodenum, No bowel obstruction. Cefepime restarted 11/11-11/15 with improvement in leukocytosis. Patient underwent Endoscopic adjustment of peg 11/13 with Dr. Rosales. XR tube check performed.     11/16: S/p Endoscopic Peg adjustment 11/13 w/ Dr Rosales. AXR performed. Tube Feeds resumed at 40cc/hr for 24hr. Not tolerating Volera for airway clearance (periods of Desaturation) therefore discontinued. Monitor off rectal tube as without diarrhea. Had low grade fever on 11/16 75 yo F PMH T2DM on insulin, HTN, HLD, hypothyroidism, RA on Prednisone, Fibromyalgia, cerebral aneurysm s/p repair, chronic hypercapnic respiratory failure s/p trach (vent dependent) and chronic PEG 2022, recurrent C. diff infection (follows with Dr. Newman), persistent GJ tube leaks now s/p GC fistula repair 8/12 BIBEMS on 10/7 with daughter for respiratory distress, hypoxia to 88%.  Pt also noted to have rectal bleeding week prior to presentation requiring transfusion 5 days ago in ED as per daughter. Daughter also reports brownish discharge from G-J tube. In ED, pt afebrile, fiO2 96-98% on 40% on ventilator.  Chest X-ray w/ opacification of right lung concerning for possible PNA.  Admitted to RCU for further management. Sputum cxs grew PSA; treated with course of Cefepime. Patient with decreased H+H received 1 unit of PRBCS on 10/10.  10/14 EGD w/ Dr. Rosales for PEGJ exchange and clippings placed for closure of fistula site.  Persistent fevers treated with meropenem and vanc (d/c'd 10/20).  CT A/P without infectious source.  Continued fevers and persistent leukocytosis 11/1 CDiff positive - po vanco started, IV flagyl added (11/3-11/8). Head CT performed on 11/6 due to concern of lethargy no acute intracranial findings were noted; likely related to metabolic encephalopathy. Patient continued to have persistent fevers and was empirically treated with Cefepime (11/5-11/9) for CR PSA in sputum cx. CT C/A/P performed 11/6 c/w consolidative opacity LLL. Evening of 11/10-11/11, WBC 15 --> 21, procal 1 --> 43, lactate 3.2 -- pt also febrile. Infectious w/u sent 11/10 -- BCx negative, trach aspirate cx pending, UA negative. CT CAP on 11/11 showed left greater than right basilar consolidation and patchy ground glass opacity in both lungs consistent with atelectasis and/or pneumonia; Feeding tube coils in the stomach with tip within the first portion of the duodenum, No bowel obstruction. Cefepime restarted 11/11-11/15 with improvement in leukocytosis. Patient underwent Endoscopic adjustment of peg 11/13 with Dr. Rosales. XR tube check performed.     11/17: S/p Endoscopic Peg adjustment 11/13 w/ Dr Rosales. Tube Feeds resumed at 40cc/hr for 24hrfor 3 days and then changed back to 80cc/hr x 12h as standard. Not tolerating Volera for airway clearance (periods of Desaturation) therefore discontinued. Patient not as responsive since 1 week ago or so.  75 yo F PMH T2DM on insulin, HTN, HLD, hypothyroidism, RA on Prednisone, Fibromyalgia, cerebral aneurysm s/p repair, chronic hypercapnic respiratory failure s/p trach (vent dependent) and chronic PEG 2022, recurrent C. diff infection (follows with Dr. Newman), persistent GJ tube leaks now s/p GC fistula repair 8/12 BIBEMS on 10/7 with daughter for respiratory distress, hypoxia to 88%.  Pt also noted to have rectal bleeding week prior to presentation requiring transfusion 5 days ago in ED as per daughter. Daughter also reports brownish discharge from G-J tube. In ED, pt afebrile, fiO2 96-98% on 40% on ventilator.  Chest X-ray w/ opacification of right lung concerning for possible PNA.  Admitted to RCU for further management. Sputum cxs grew PSA; treated with course of Cefepime. Patient with decreased H+H received 1 unit of PRBCS on 10/10.  10/14 EGD w/ Dr. Rosales for PEGJ exchange and clippings placed for closure of fistula site.  Persistent fevers treated with meropenem and vanc (d/c'd 10/20).  CT A/P without infectious source.  Continued fevers and persistent leukocytosis 11/1 CDiff positive - po vanco started, IV flagyl added (11/3-11/8). Head CT performed on 11/6 due to concern of lethargy no acute intracranial findings were noted; likely related to metabolic encephalopathy. Patient continued to have persistent fevers and was empirically treated with Cefepime (11/5-11/9) for CR PSA in sputum cx. CT C/A/P performed 11/6 c/w consolidative opacity LLL. Evening of 11/10-11/11, WBC 15 --> 21, procal 1 --> 43, lactate 3.2 -- pt also febrile. Infectious w/u sent 11/10 -- BCx negative, trach aspirate cx pending, UA negative. CT CAP on 11/11 showed left greater than right basilar consolidation and patchy ground glass opacity in both lungs consistent with atelectasis and/or pneumonia; Feeding tube coils in the stomach with tip within the first portion of the duodenum, No bowel obstruction. Cefepime restarted 11/11-11/15 with improvement in leukocytosis. Patient underwent Endoscopic adjustment of peg 11/13 with Dr. Rosales. XR tube check performed.     11/17: S/p Endoscopic Peg adjustment 11/13 w/ Dr Rosales. Tube Feeds resumed at 40cc/hr for 24hrfor 3 days and then changed back to 80cc/hr x 12h as standard. Not tolerating Volera for airway clearance (periods of Desaturation) therefore discontinued. Patient not as responsive since 1 week ago or so. Comfort care discussed with  and daughter Marysol.

## 2024-11-18 NOTE — PROGRESS NOTE ADULT - ASSESSMENT
73 yo F PMH T2DM on insulin, HTN, HLD, hypothyroidism, RA on Prednisone, Fibromyalgia, cerebral aneurysm s/p repair, chronic hypercapnic respiratory failure s/p trach (vent dependent) and chronic PEG 2022, recurrent C. diff infection (follows with Dr. Newman), persistent GJ tube leaks now s/p GC fistula repair 8/12 with hypoxia, PNA, with anemia    #anemia, AOCD + phlebotomy  - pt with multiple chronic issues causing AOCD with acute worsening in context of acute illness + phlebotomy  - was fecal occult + on admission but now no overt bleeding  - no renal insufficiency  - s/p PRBC 10/10, 10/30  - iron studies 10/30 with AOCD, no evidence of hemolysis  - WBC/plt stable; plt with some fluctuation, currently normal  - B12/folate normal  - discussed with daughter prior that unfortunately no simple treatment for anemia of chronic disease besides transfusions as needed  - s/p 1 unit PRBC 11/12  - hg lower in setting of lactic acidosis, acute illness- monitor    #thrombocytopenia- with fluctuating plt in past; likely lower in setting of acute illness; not on heparin    d/w RCU

## 2024-11-18 NOTE — CONSULT NOTE ADULT - REASON FOR ADMISSION
Patient admitted with Hypoxemia and episode of Bright red blood per rectum

## 2024-11-18 NOTE — PROGRESS NOTE ADULT - SUBJECTIVE AND OBJECTIVE BOX
Chief Complaint:  FU anemia    History of Present Illness:  chart reviewed, with lactic acidosis, hypotension;  no overt bleeding, pt resting, ROS not obtainable; d/w RCU       MEDICATIONS  (STANDING):  albuterol/ipratropium for Nebulization 3 milliLiter(s) Nebulizer every 6 hours  artificial  tears Solution 1 Drop(s) Both EYES every 6 hours  ascorbic acid 500 milliGRAM(s) Oral daily  Biotene Dry Mouth Oral Rinse 5 milliLiter(s) Swish and Spit every 6 hours  calcium carbonate 1250 mG  + Vitamin D (OsCal 500 + D) 1 Tablet(s) Oral <User Schedule>  ceftolozane/tazobactam IVPB 3000 milliGRAM(s) IV Intermittent every 8 hours  chlorhexidine 0.12% Liquid 15 milliLiter(s) Oral Mucosa every 12 hours  chlorhexidine 4% Liquid 1 Application(s) Topical daily  dextrose 5%. 1000 milliLiter(s) (100 mL/Hr) IV Continuous <Continuous>  dextrose 5%. 1000 milliLiter(s) (50 mL/Hr) IV Continuous <Continuous>  dextrose 50% Injectable 25 Gram(s) IV Push once  dextrose 50% Injectable 12.5 Gram(s) IV Push once  diclofenac sodium 1% Gel 2 Gram(s) Topical every 6 hours  escitalopram 10 milliGRAM(s) Oral <User Schedule>  fentaNYL   Patch  12 MICROgram(s)/Hr 1 Patch Transdermal every 72 hours  fentaNYL   Patch  25 MICROgram(s)/Hr 1 Patch Transdermal every 72 hours  FIRST- Mouthwash  BLM 5 milliLiter(s) Swish and Spit every 8 hours  gabapentin Solution 250 milliGRAM(s) Oral <User Schedule>  glucagon  Injectable 1 milliGRAM(s) IntraMuscular once  hemorrhoidal Ointment 1 Application(s) Rectal at bedtime  influenza  Vaccine (HIGH DOSE) 0.5 milliLiter(s) IntraMuscular once  insulin lispro (ADMELOG) corrective regimen sliding scale   SubCutaneous every 6 hours  lactobacillus acidophilus 1 Tablet(s) Oral <User Schedule>  levothyroxine 125 MICROGram(s) Oral <User Schedule>  lidocaine   4% Patch 4 Patch Transdermal every 24 hours  melatonin 12 milliGRAM(s) Oral <User Schedule>  midodrine 10 milliGRAM(s) Oral every 8 hours  nystatin Powder 1 Application(s) Topical every 8 hours  pantoprazole  Injectable 40 milliGRAM(s) IV Push every 12 hours  predniSONE  Solution 5 milliGRAM(s) Enteral Tube <User Schedule>  QUEtiapine 12.5 milliGRAM(s) Oral <User Schedule>  sodium chloride 1 Gram(s) Oral every 8 hours  sodium chloride 0.65% Nasal 1 Spray(s) Both Nostrils every 6 hours  vancomycin    Solution 125 milliGRAM(s) Oral every 6 hours  witch hazel Pads 1 Application(s) Topical every 12 hours    MEDICATIONS  (PRN):  acetaminophen   Oral Liquid .. 650 milliGRAM(s) Oral every 6 hours PRN Temp greater or equal to 38C (100.4F), Mild Pain (1 - 3)  aluminum hydroxide/magnesium hydroxide/simethicone Suspension 30 milliLiter(s) Enteral Tube every 4 hours PRN Dyspepsia      Allergies    walnut (Unknown)  metronidazole (Rash)  Lyrica (Unknown)  penicillin (Unknown)  Pineapple (Unknown)  Tagamet (Unknown)  heparin (Unknown)  Pecans (Unknown)  Hazelnut (Unknown)  meropenem (Rash)    Intolerances        Vital Signs Last 24 Hrs  T(C): 36.4 (18 Nov 2024 10:26), Max: 36.8 (18 Nov 2024 04:54)  T(F): 97.5 (18 Nov 2024 10:26), Max: 98.3 (18 Nov 2024 04:54)  HR: 105 (18 Nov 2024 11:49) (97 - 124)  BP: 96/52 (18 Nov 2024 10:26) (88/49 - 108/54)  BP(mean): --  RR: 18 (18 Nov 2024 10:26) (18 - 19)  SpO2: 98% (18 Nov 2024 11:49) (89% - 99%)    Parameters below as of 18 Nov 2024 10:26  Patient On (Oxygen Delivery Method): ventilator        PHYSICAL EXAM  General: adult in NAD  HEENT: clear oropharynx, anicteric sclera, pink conjunctiva  Neck: trach  CV: normal S1/S2  Lungs: clear to auscultation, no wheezes, no rales  Abdomen: soft non-tender non-distended, positive bowel sounds  Ext: no calf tenderness  Skin: no rashes and no petechiae  Neuro: lethargic         LABS:                          7.6    22.92 )-----------( 106      ( 18 Nov 2024 07:12 )             27.3         Mean Cell Volume : 94.8 fl  Mean Cell Hemoglobin : 26.4 pg  Mean Cell Hemoglobin Concentration : 27.8 g/dL  Auto Neutrophil # : x  Auto Lymphocyte # : x  Auto Monocyte # : x  Auto Eosinophil # : x  Auto Basophil # : x  Auto Neutrophil % : x  Auto Lymphocyte % : x  Auto Monocyte % : x  Auto Eosinophil % : x  Auto Basophil % : x      Serial CBC's  11-18 @ 07:12  Hct-27.3 / Hgb-7.6 / Plat-106 / RBC-2.88 / WBC-22.92  Serial CBC's  11-17 @ 06:30  Hct-33.1 / Hgb-9.5 / Plat-122 / RBC-3.65 / WBC-15.46  Serial CBC's  11-15 @ 06:37  Hct-28.9 / Hgb-8.6 / Plat-159 / RBC-3.26 / WBC-10.66      11-18    137  |  105  |  31[H]  ----------------------------<  73  4.6   |  11[L]  |  0.71    Ca    8.7      18 Nov 2024 07:12  Phos  3.5     11-18  Mg     2.0     11-18    TPro  5.5[L]  /  Alb  2.2[L]  /  TBili  0.9  /  DBili  x   /  AST  36  /  ALT  18  /  AlkPhos  106  11-18                      Radiology:

## 2024-11-18 NOTE — PROGRESS NOTE ADULT - PROBLEM SELECTOR PLAN 2
[] Sepsis  - Lactate >8 / Procal 62.17  - Empirically placed on Zerbaxa  - Initiated on Midodrine 10 mg q 8 hrs; Prednisone changed to Solu-cortef 50 mg IVP Q 8 HRS   - F/u blood cx, UA/ Urine cx/ Sputum cx / CXR     [] PNA   - Sputum cx 10/8: PSA  - CXR 10/7: Opacification of the right middle lobe may represent pneumonia versus atelectasis  - CT A/P 10/18: ? Multifocal pneumonia  - Completed Initial course of cefepime on 10/12  - Sputum 11/4: PSA; Repeat CT C/A/P 11/6: Consolidative opacity LLL  - Cefepime restarted 11/5-->11/15; in setting of recurrent fevers  - CT CAP on 11/11 showed left greater than right basilar consolidation and patchy groundglass opacity in both lungs consistent with atelectasis and/or pneumonia.     [] C.Diff   - Stool C.diff: + 11/1   - S/p Flagyl 11/3-11/8  - Continue enteral Vanco

## 2024-11-18 NOTE — PROGRESS NOTE ADULT - PROBLEM SELECTOR PLAN 7
- Continue prn Tylenol   - Continue Gabapentin / PRN OXY  - Continue Fentanyl patches (Renew 11/9) - Continue prn Tylenol   - Continue Gabapentin / PRN OXY  - Continue Fentanyl patches (Renew 11/19)

## 2024-11-18 NOTE — CONSULT NOTE ADULT - PROBLEM SELECTOR RECOMMENDATION 5
will continue to follow for goc  case discussed with rcu team  Can be reached by TEAMS M-F 9-5 Loni Amador Any other time please page [phone] if needed

## 2024-11-18 NOTE — PROGRESS NOTE ADULT - ASSESSMENT
75 yo F w/h/o controlled T2DM (A1C 5.8%) on insulin at home. Also HTN, HLD, hypothyroidism, RA on Prednisone, Fibromyalgia, cerebral aneurysm s/p repair, chronic hypercapnic respiratory failure s/p trach (vent dependent) and chronic PEG 2022, recurrent C. diff infection (follows with Dr. 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 S/p antibiotics and s/p GJ tube exchanges on 10/14, s/p PEG replacement 10/21.     Endocrine consulted for Type 2 DM management while on tube feeding.    Tolerating 24 hour continuous tube feeding( AfterYes Peptide 1.5 ([id number]) at 80 ml/hr). Last 24 hour BGs 91-200s.  FBG 91mg/dl Today while on Lantus 10 units and moderate correction scale only. Will decreased lantus dose.  On Prednisone 5 mg daily          Home DM medications: Lantus 35 units at HS, Humalog 26 units with # 1 feeding, 22 units with #2 tube feeding, 20 units with #3 tube feeding and  Humalog correction scale (  2 units for -679, 4 units for -250, 6 units for -300, 8units for -350, 10 units for -400, 12 units for -450)   while on [employer] formula 3 cans/day, slow bolus( run at 80 ml/hr total volume 960 ml/day> 1st can around 6:30-8:30, 2nd can around 3 pm, 3rd can around 8-9 pm) plus Banatrol 1/2 packet BID, was increasing to 1 packet BID, plus Kefir 10 oz/day ( divided in multiple times throughout the day).

## 2024-11-18 NOTE — CONSULT NOTE ADULT - SUBJECTIVE AND OBJECTIVE BOX
Date of Service: 11-19-24 @ 08:56    HPI:  73 yo F PMH T2DM on insulin, HTN, HLD, hypothyroidism, RA on Prednisone, Fibromyalgia, cerebral aneurysm s/p repair, chronic hypercapnic respiratory failure s/p trach (vent dependent) and chronic PEG 2022, recurrent C. diff infection (follows with Dr. Newman), persistent GJ tube leaks now s/p GC fistula repair 8/12 BIBEMS with daughter for respiratory distress, hypoxia to high 80s.  Pt also noted to have rectal bleeding this past week requiring transfusion 5 days ago in ED as per daughter.  In ED, pt afebrile, fiO2 96-98% on 40% on ventilator.  Chest Xray w/ opacification of right lung concerning for possible PNA.  Admitted to [facility] for further management.      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      H/O tracheostomy    PEG (percutaneous endoscopic gastrostomy) status      FAMILY HISTORY:      ITEMS NOT CHECKED ARE NOT PRESENT    SOCIAL HISTORY:   Significant other/partner[x ]  Children[ x]  Catholic/Spirituality:  Substance hx:  [ ]   Tobacco hx:  [ ]   Alcohol hx: [ ]   Home Opioid hx:  [ ] I-Stop Reference No:  Living Situation: [ x]Home  [ ]Long term care  [ ]Rehab [ ]Other    ADVANCE DIRECTIVES:    DNR/MOLST  [x ]  Living Will  [ ]   DECISION MAKER(s):  [ x] Health Care Proxy(s)  [ ] Surrogate(s)  [ ] Guardian           Name(s): Phone Number(s): pt's      BASELINE (I)ADL(s) (prior to admission):  [place]: [ ]Total  [ ] Moderate [ x]Dependent    Allergies    walnut (Unknown)  metronidazole (Rash)  Lyrica (Unknown)  penicillin (Unknown)  Pineapple (Unknown)  Tagamet (Unknown)  heparin (Unknown)  Pecans (Unknown)  Hazelnut (Unknown)  meropenem (Rash)    Intolerances    MEDICATIONS  (STANDING):  albuterol/ipratropium for Nebulization 3 milliLiter(s) Nebulizer every 6 hours  artificial  tears Solution 1 Drop(s) Both EYES every 6 hours  ascorbic acid 500 milliGRAM(s) Oral daily  Biotene Dry Mouth Oral Rinse 5 milliLiter(s) Swish and Spit every 6 hours  calcium carbonate 1250 mG  + Vitamin D (OsCal 500 + D) 1 Tablet(s) Oral <User Schedule>  ceftolozane/tazobactam IVPB 3000 milliGRAM(s) IV Intermittent every 8 hours  chlorhexidine 0.12% Liquid 15 milliLiter(s) Oral Mucosa every 12 hours  chlorhexidine 4% Liquid 1 Application(s) Topical daily  dextrose 5% 1000 milliLiter(s) (125 mL/Hr) IV Continuous <Continuous>  dextrose 5%. 1000 milliLiter(s) (100 mL/Hr) IV Continuous <Continuous>  dextrose 5%. 1000 milliLiter(s) (50 mL/Hr) IV Continuous <Continuous>  dextrose 50% Injectable 25 Gram(s) IV Push once  dextrose 50% Injectable 12.5 Gram(s) IV Push once  diclofenac sodium 1% Gel 2 Gram(s) Topical every 6 hours  escitalopram 10 milliGRAM(s) Oral <User Schedule>  fentaNYL   Patch  12 MICROgram(s)/Hr 1 Patch Transdermal every 72 hours  fentaNYL   Patch  25 MICROgram(s)/Hr 1 Patch Transdermal every 72 hours  FIRST- Mouthwash  BLM 5 milliLiter(s) Swish and Spit every 8 hours  gabapentin Solution 250 milliGRAM(s) Oral <User Schedule>  glucagon  Injectable 1 milliGRAM(s) IntraMuscular once  hemorrhoidal Ointment 1 Application(s) Rectal at bedtime  hydrocortisone sodium succinate Injectable 50 milliGRAM(s) IV Push every 8 hours  influenza  Vaccine (HIGH DOSE) 0.5 milliLiter(s) IntraMuscular once  insulin glargine Injectable (LANTUS) 5 Unit(s) SubCutaneous <User Schedule>  insulin lispro (ADMELOG) corrective regimen sliding scale   SubCutaneous every 6 hours  lactobacillus acidophilus 1 Tablet(s) Oral <User Schedule>  levothyroxine 125 MICROGram(s) Oral <User Schedule>  lidocaine   4% Patch 4 Patch Transdermal every 24 hours  midodrine 10 milliGRAM(s) Oral every 8 hours  nystatin Powder 1 Application(s) Topical every 8 hours  pantoprazole  Injectable 40 milliGRAM(s) IV Push every 12 hours  sodium chloride 1 Gram(s) Oral every 8 hours  sodium chloride 0.65% Nasal 1 Spray(s) Both Nostrils every 6 hours  triamcinolone 0.1% Oral Paste 1 Application(s) Topical every 8 hours  vancomycin    Solution 125 milliGRAM(s) Oral every 6 hours  witch hazel Pads 1 Application(s) Topical every 12 hours    MEDICATIONS  (PRN):  acetaminophen   Oral Liquid .. 650 milliGRAM(s) Oral every 6 hours PRN Temp greater or equal to 38C (100.4F), Mild Pain (1 - 3)  aluminum hydroxide/magnesium hydroxide/simethicone Suspension 30 milliLiter(s) Enteral Tube every 4 hours PRN Dyspepsia    PRESENT SYMPTOMS: [x ]Unable to self-report see CPOT, PAINADs, RDOS  Source if other than patient:  [ ]Family   [ x]Team     Pain: [ ]yes [ ]no  QOL impact -   Location -                    Aggravating factors -  Quality -  Radiation -  Timing-  Severity (0-10 scale):  Minimal acceptable level (0-10 scale):       Dyspnea:                           [ ]Mild [ ]Moderate [ ]Severe  Anxiety:                             [ ]Mild [ ]Moderate [ ]Severe  Fatigue:                             [ ]Mild [ ]Moderate [ ]Severe  Nausea:                             [ ]Mild [ ]Moderate [ ]Severe  Loss of appetite:              [ ]Mild [ ]Moderate [ ]Severe  Constipation:                    [ ]Mild [ ]Moderate [ ]Severe    PCSSQ [Palliative Care Spiritual Screening Question]   Severity (0-10):  Score of 4 or > indicate consideration of Chaplaincy referral.  Chaplaincy Referral: [ ] yes [ ] refused [ ] following    Caregiver [place]? : [ ] yes [ ] no [ ] deferred:  Social work referral [ ] Patient & Family Centered Care Referral [ ]     Anticipatory Grief Present?: [ ] yes [ ] no  [ ] deferred: Palliative Social work referral [ ]  Patient & Family Centered Care Referral [ ]       Other Symptoms:  [ ]All other review of systems negative   [x] Unable to obtain due to poor mentation    PHYSICAL EXAM:  Vital Signs Last 24 Hrs  T(C): 36.7 (19 Nov 2024 05:16), Max: 36.9 (19 Nov 2024 03:18)  T(F): 98.1 (19 Nov 2024 05:16), Max: 98.4 (19 Nov 2024 03:18)  HR: 93 (19 Nov 2024 06:00) (78 - 109)  BP: 125/61 (19 Nov 2024 05:16) (96/52 - 129/68)  BP(mean): --  RR: 28 (19 Nov 2024 05:16) (18 - 28)  SpO2: 100% (19 Nov 2024 06:00) (96% - 100%)    Parameters below as of 19 Nov 2024 06:00  Patient On (Oxygen Delivery Method): ventilator     I&O's Summary    18 Nov 2024 07:01  -  19 Nov 2024 07:00  --------------------------------------------------------  IN: 0 mL / OUT: 300 mL / NET: -300 mL        GENERAL:  [ ]Alert  [ ]Oriented x   [x]Lethargic  [ ]Cachexia  [ ]Unarousable  [ ]Verbal  [ x]Non-Verbal  Behavioral:   [ ]Anxiety  [ ]Delirium [ ]Agitation [ ]Other  HEENT:  [ ]Normal   [x ]Dry mouth   [ ]ET Tube/Trach  [ ]Oral lesions  PULMONARY:   [x]Clear [ ]Tachypnea  [ ]Audible excessive secretions   [ ]Rhonchi        [ ]Right [ ]Left [ ]Bilateral  [ ]Crackles        [ ]Right [ ]Left [ ]Bilateral  [ ]Wheezing     [ ]Right [ ]Left [ ]Bilateral  [x ]Diminished BS [ ] Right [ ]Left [x ]Bilateral  CARDIOVASCULAR:    [x]Regular [ ]Irregular [ ]Tachy  [ ]Red [ ]Murmur [ ]Other  GASTROINTESTINAL:  [x]Soft  [ ]Distended   [x]+BS  [x]Non tender [ ]Tender  [ ]PEG [ ]OGT/ NGT   Last BM:    GENITOURINARY:  [ ]Normal [ ]Incontinent   [ ]Oliguria/Anuria   [ ]Mcbride  MUSCULOSKELETAL:   [ ]Normal   [x]Weakness  [x ]Bed/Wheelchair bound [ ]Edema  NEUROLOGIC:   [ ]No focal deficits  [ x] Cognitive impairment  [ ] Dysphagia [ ]Dysarthria [ ] Paresis [ ]Other   SKIN:   [x]Normal  [ ]Rash   [ ]Pressure ulcer(s) [ ]y [ ]n present on admission    CRITICAL CARE:  [ ] Shock Present  [ ]Septic [ ]Cardiogenic [ ]Neurologic [ ]Hypovolemic  [ ]  Vasopressors [ ]  Inotropes   [ ]Respiratory failure present [ ]Mechanical ventilation [ ]Non-invasive ventilatory support [ ]High flow  Mode: AC/ CMV (Assist Control/ Continuous Mandatory Ventilation), RR (machine): 28, TV (machine): 450, FiO2: 40, PEEP: 5, ITime: 1, MAP: 17, PIP: 47  [ ]Acute  [ ]Chronic [ ]Hypoxic  [ ]Hypercarbic [ ]Other  [ ]Other organ failure     LABS:                        7.4    27.15 )-----------( 89       ( 19 Nov 2024 07:12 )             25.2   11-19    137  |  104  |  38[H]  ----------------------------<  193[H]  5.4[H]   |  11[L]  |  0.84    Ca    8.7      19 Nov 2024 07:12  Phos  2.8     11-19  Mg     2.0     11-19    TPro  5.6[L]  /  Alb  2.1[L]  /  TBili  1.0  /  DBili  x   /  AST  39  /  ALT  23  /  AlkPhos  113  11-19      Urinalysis Basic - ( 19 Nov 2024 07:12 )    Color: x / Appearance: x / SG: x / pH: x  Gluc: 193 mg/dL / Ketone: x  / Bili: x / Urobili: x   Blood: x / Protein: x / Nitrite: x   Leuk Esterase: x / RBC: x / WBC x   Sq Epi: x / Non Sq Epi: x / Bacteria: x      RADIOLOGY & ADDITIONAL STUDIES:  < from: Xray Abdomen 1 View PORTABLE -Urgent (Xray Abdomen 1 View PORTABLE -Urgent .) (11.14.24 @ 01:23) >    ACC: [id number] EXAM:  XR ABDOMEN PORTABLE URGENT 1V   ORDERED BY: ALLY COBOS     PROCEDURE DATE:  11/14/2024          INTERPRETATION:  CLINICAL INDICATION: GJ Tube Placement    TECHNIQUE: Frontal radiograph of the abdomen.    COMPARISON: CT abdomen and pelvis 11/11/2024, x-ray chest and abdomen   11/9/2024    INTERPRETATION:    Midline abdominal coil material. Surgical clips overlie the left upper   quadrant.    Gastrojejunostomy tubing overlies the abdomen. Oral contrast within loops   of small bowel in the left hemiabdomen. No obstruction or extravasation.    IMPRESSION:    Oral contrast and loops of small bowel within the left hemiabdomen. No   obstruction or extravasation.    --- End of Report ---           FAUSTINO ZHANG MD; ResidentRadiologist  This document has been electronically signed.  MARK EDWARDS MD; Attending Radiologist  This document has been electronically signed. Nov 14 2024  9:43AM    < end of copied text >    PROTEIN CALORIE MALNUTRITION PRESENT: [ ]mild [ ]moderate [ ]severe [ ]underweight [ ]morbid obesity  https://www.andeal.org/vault/2440/web/files/ONC/Table_Clinical%20Characteristics%20to%20Document%20Malnutrition-White%20JV%20et%20al%251696.pdf    Height (cm): 149.9 (11-13-24 @ 13:27), 152.4 (06-20-24 @ 11:41), 152.4 (06-03-24 @ 18:13)  Weight (kg): 54.4 (11-13-24 @ 13:27), 56 (10-02-24 @ 21:56), 54.3 (08-11-24 @ 15:58)  BMI (kg/m2): 24.2 (11-13-24 @ 13:27), 24.1 (10-02-24 @ 21:56), 23.4 (08-11-24 @ 15:58)    [ x]PPSV2 < or = to 30% [ ]significant weight loss  [ ]poor nutritional intake  [ ]anasarca[ ]Artificial Nutrition      Other REFERRALS:  [ ]Hospice  [ ]Child Life  [ ]Social Work  [ ]Case management [ ]Holistic Therapy     Care Coordination Assessment 201 [C. Provider] (10-08-24 @ 17:14)      Palliative Performance Scale:  http://npcrc.org/files/news/palliative_performance_scale_ppsv2.pdf  (Ctrl +  left click to view)  Respiratory Distress Observation Tool:  https://homecareinformation.net/handouts/hen/Respiratory_Distress_Observation_Scale.pdf (Ctrl +  left click to view)  PAINAD Score:  http://geriatrictoolkit.[place].[facility]/cog/painad.pdf (Ctrl +  left click to view)        Last Updated: 18-Nov-2024 18:37 by Nelly Monge)

## 2024-11-18 NOTE — PROGRESS NOTE ADULT - PROBLEM SELECTOR PLAN 6
- Continue home prednisone 5 mg daily  - Pt receives Enbrel injections q Thursday (will hold in setting of recent infection) - Pt receives Enbrel injections q Thursday (will hold in setting of recent infection)  - Home prednisone dcd and changed to Solu-cortef in setting of sepsis

## 2024-11-18 NOTE — CONSULT NOTE ADULT - PROBLEM SELECTOR RECOMMENDATION 2
Wound Consult requested to assist w/ management of  Sacral/bilateral Buttocks chronic stage 4 pressure injury now resolved with some denuded areas of moisture  Left ischium chronic stage 4 pressure injury now resolved  Incontinence Dermatitis  BL hallux and Lt 5th toe DTPI  -> defer to wound care

## 2024-11-18 NOTE — CONSULT NOTE ADULT - PROVIDER SPECIALTY LIST ADULT
Dental
MICU
Heme/Onc
Palliative Care
Ophthalmology
Wound Care
Gastroenterology
Infectious Disease
ENT
Endocrinology

## 2024-11-18 NOTE — PROGRESS NOTE ADULT - PROBLEM SELECTOR PLAN 1
- Patient with Chronic Trach at baseline   - Patient presented to ED with Hypoxemia in setting of PNA   - ABG today with severe Metabolic Acidosis   - Mechanical vent: Volume ctrl  AC /CMV: VT increased to 450 RR: Increased to 28 / PEEP: 5 fio2 40%  - Repeat ABG 8 PM   - Continue Duoneb and Chest PT  - Continue Chest PT / Suctioning PRN

## 2024-11-18 NOTE — CONSULT NOTE ADULT - CONSULT REQUESTED DATE/TIME
18-Nov-2024
30-Oct-2024
08-Oct-2024
18-Nov-2024
19-Oct-2024 04:10
08-Oct-2024
08-Oct-2024 15:18
08-Oct-2024 16:17
30-Oct-2024 15:15
15-Oct-2024 11:36

## 2024-11-18 NOTE — CONSULT NOTE ADULT - CONVERSATION DETAILS
Spoke with family with RCU team. Following medical updates reinforced that pt is clinically declining. Discussed progression of disease, low BP refractory to treatment, and overall guarded prognosis. Discussed prior GOC, and explained skin changes are likely related to severe debility and the "body shutting down". Discussed previous advanced care planning, pt's daughter was a former estate  and stated in her mom's paperwork (living will) it reflected DNR/I but she did not think her mom would want that at this time. Requested copy of document.   Family inquired on dental evaluation and stated since they are leaning towards a comfort based approach to care this could improve mom's quality of life. Discussed low likelihood of dental extraction due to high risk of bleeidng and if goals transition to comfort measures only management would be with symptom medications as a dental procedure can increase suffering. At this time family goals remain for ongoing medical management. Emotional support provided.

## 2024-11-18 NOTE — PROGRESS NOTE ADULT - SUBJECTIVE AND OBJECTIVE BOX
Follow Up:  leukocytosis    Interval History/ROS:  decreased responsiveness, increased leukocytosis, lactic acidosis    Allergies  walnut (Unknown)  metronidazole (Rash)  Lyrica (Unknown)  penicillin (Unknown)  Pineapple (Unknown)  Tagamet (Unknown)  heparin (Unknown)  Pecans (Unknown)  Hazelnut (Unknown)  meropenem (Rash)        ANTIMICROBIALS:  ceftolozane/tazobactam IVPB 3000 every 8 hours  vancomycin    Solution 125 every 6 hours      OTHER MEDS:  MEDICATIONS  (STANDING):  acetaminophen   Oral Liquid .. 650 every 6 hours PRN  albuterol/ipratropium for Nebulization 3 every 6 hours  aluminum hydroxide/magnesium hydroxide/simethicone Suspension 30 every 4 hours PRN  dextrose 50% Injectable 25 once  dextrose 50% Injectable 12.5 once  escitalopram 10 <User Schedule>  fentaNYL   Patch  12 MICROgram(s)/Hr 1 every 72 hours  fentaNYL   Patch  25 MICROgram(s)/Hr 1 every 72 hours  gabapentin Solution 250 <User Schedule>  glucagon  Injectable 1 once  hydrocortisone sodium succinate Injectable 50 every 8 hours  influenza  Vaccine (HIGH DOSE) 0.5 once  insulin lispro (ADMELOG) corrective regimen sliding scale  every 6 hours  levothyroxine 125 <User Schedule>  midodrine 10 every 8 hours  pantoprazole  Injectable 40 every 12 hours      Vital Signs Last 24 Hrs  T(C): 36.3 (2024 17:48), Max: 36.8 (2024 04:54)  T(F): 97.3 (2024 17:48), Max: 98.3 (2024 04:54)  HR: 95 (2024 17:48) (95 - 124)  BP: 127/61 (2024 17:48) (88/49 - 127/61)  BP(mean): --  RR: 22 (2024 17:48) (18 - 22)  SpO2: 100% (2024 17:48) (94% - 100%)    Parameters below as of 2024 17:48  Patient On (Oxygen Delivery Method): ventilator        PHYSICAL EXAM:  General: chornically ill appearing  M&T peridontal disease, loose front tooth (about #24) teeth in poor repair, superficial ulceration inside lower  mouth  Neurology: A&Ox3, nonfocal  Respiratory: Clear to auscultation bilaterally  CV: RRR, S1S2, no murmurs, rubs or gallops  Abdominal: Soft, Non-tender, non-distended, normal bowel sounds  Extremities: No edema,   Line Sites: Clear  Skin: No rash                          7.6    22.92 )-----------( 106      ( 2024 07:12 )             27.3           137  |  105  |  31[H]  ----------------------------<  73  4.6   |  11[L]  |  0.71    Ca    8.7      2024 07:12  Phos  3.5       Mg     2.0         TPro  5.5[L]  /  Alb  2.2[L]  /  TBili  0.9  /  DBili  x   /  AST  36  /  ALT  18  /  AlkPhos  106        Urinalysis Basic - ( 2024 16:46 )    Color: Dark Yellow / Appearance: Cloudy / S.023 / pH: x  Gluc: x / Ketone: Trace mg/dL  / Bili: Small / Urobili: 0.2 mg/dL   Blood: x / Protein: 30 mg/dL / Nitrite: Negative   Leuk Esterase: Trace / RBC: 6 /HPF / WBC 0 /HPF   Sq Epi: x / Non Sq Epi: 1 /HPF / Bacteria: Moderate /HPF        MICROBIOLOGY:  Trach Asp Tracheal Aspirate  11-10-24   Few Pseudomonas aeruginosa (Carbapenem Resistant)  Commensal vinay consistent with body site  --  Pseudomonas aeruginosa (Carbapenem Resistant)      .Blood BLOOD  11-10-24   No growth at 5 days  --  --      .Sputum Sputum  24   Moderate Mixed gram negative rods including  Moderate Pseudomonas aeruginosa  Commensal vinay consistent with body site  --  Pseudomonas aeruginosa      Clean Catch Clean Catch (Midstream)  24   No growth  --  --      .Blood BLOOD  24   No growth at 5 days  --  --      .Blood BLOOD  24   No growth at 5 days  --  --      .Blood BLOOD  24   Growth in anaerobic bottle: Staphylococcus epidermidis  Isolation of Coagulase negative Staphylococcus from single blood culture  sets may represent  contamination. Contact the Microbiology Department at [phone] if  susceptibility testing is needed.  clinically indicated.  Direct identification is available within approximately 3-5  hours either by Blood Panel Multiplexed PCR or Direct  MALDI-TOF. Details: [email or web address]  --  Blood Culture PCR      Catheterized Catheterized  24   No growth  --  --      .Blood BLOOD  24   No growth at 5 days  --  --      Trach Asp Tracheal Aspirate  10-27-24   Moderate Pseudomonas aeruginosa  Moderate Pseudomonas aeruginosa #2  Multiple Morphological Strains  Commensal vinay consistent with body site  --  Pseudomonas aeruginosa  Pseudomonas aeruginosa      .Blood BLOOD  10-27-24   No growth at 5 days  --  --          v          RADIOLOGY:    Zion Newman MD; Division of Infectious Disease; Pager: [phone]; nights and weekends: [phone]

## 2024-11-18 NOTE — PROGRESS NOTE ADULT - SUBJECTIVE AND OBJECTIVE BOX
Patient is a 74y old  Female who presents with a chief complaint of Patient admitted with Hypoxemia and episode of Bright red blood per rectum (17 Nov 2024 12:11)      Interval Events: No events reported overnight     REVIEW OF SYSTEMS:  [ ] Positive  [ ] All other systems negative  [ ] Unable to assess ROS because ________    Vital Signs Last 24 Hrs  T(C): 36.8 (11-18-24 @ 04:54), Max: 37 (11-17-24 @ 11:45)  T(F): 98.3 (11-18-24 @ 04:54), Max: 98.6 (11-17-24 @ 11:45)  HR: 104 (11-18-24 @ 07:00) (98 - 124)  BP: 108/54 (11-18-24 @ 07:00) (79/41 - 108/54)  RR: 19 (11-18-24 @ 04:54) (18 - 19)  SpO2: 97% (11-18-24 @ 05:54) (89% - 99%)    PHYSICAL EXAM:  HEENT:   [ ]Tracheostomy:  [ ]Pupils equal  [ ]No oral lesions  [ ]Abnormal    SKIN  [ ]No Rash  [ ] Abnormal  [ ] pressure    CARDIAC  [ ]Regular  [ ]Abnormal    PULMONARY  [ ]Bilateral Clear Breath Sounds  [ ]Normal Excursion  [ ]Abnormal    GI  [ ]PEG      [ ] +BS		              [ ]Soft, nondistended, nontender	  [ ]Abnormal    MUSCULOSKELETAL                                   [ ]Bedbound                 [ ]Abnormal    [ ]Ambulatory/OOB to chair                           EXTREMITIES                                         [ ]Normal  [ ]Edema                           NEUROLOGIC  [ ] Normal, non focal  [ ] Focal findings:    PSYCHIATRIC  [ ]Alert and appropriate  [ ] Sedated	 [ ]Agitated    :  Mcbride: [ ] Yes, if yes: Date of Placement:                   [  ] No    LINES: Central Lines [ ] Yes, if yes: Date of Placement                                     [  ] No    HOSPITAL MEDICATIONS:  MEDICATIONS  (STANDING):  albuterol/ipratropium for Nebulization 3 milliLiter(s) Nebulizer every 6 hours  artificial  tears Solution 1 Drop(s) Both EYES every 6 hours  ascorbic acid 500 milliGRAM(s) Oral daily  Biotene Dry Mouth Oral Rinse 5 milliLiter(s) Swish and Spit every 6 hours  calcium carbonate 1250 mG  + Vitamin D (OsCal 500 + D) 1 Tablet(s) Oral <User Schedule>  chlorhexidine 0.12% Liquid 15 milliLiter(s) Oral Mucosa every 12 hours  chlorhexidine 4% Liquid 1 Application(s) Topical daily  dextrose 5%. 1000 milliLiter(s) (100 mL/Hr) IV Continuous <Continuous>  dextrose 5%. 1000 milliLiter(s) (50 mL/Hr) IV Continuous <Continuous>  dextrose 50% Injectable 25 Gram(s) IV Push once  dextrose 50% Injectable 12.5 Gram(s) IV Push once  diclofenac sodium 1% Gel 2 Gram(s) Topical every 6 hours  erythromycin    ethylsuccinate Suspension 40 mG/mL 300 milliGRAM(s) Oral every 8 hours  escitalopram 10 milliGRAM(s) Oral <User Schedule>  fentaNYL   Patch  12 MICROgram(s)/Hr 1 Patch Transdermal every 72 hours  fentaNYL   Patch  25 MICROgram(s)/Hr 1 Patch Transdermal every 72 hours  FIRST- Mouthwash  BLM 5 milliLiter(s) Swish and Spit every 8 hours  gabapentin Solution 250 milliGRAM(s) Oral <User Schedule>  glucagon  Injectable 1 milliGRAM(s) IntraMuscular once  hemorrhoidal Ointment 1 Application(s) Rectal at bedtime  influenza  Vaccine (HIGH DOSE) 0.5 milliLiter(s) IntraMuscular once  insulin glargine Injectable (LANTUS) 10 Unit(s) SubCutaneous <User Schedule>  insulin lispro (ADMELOG) corrective regimen sliding scale   SubCutaneous every 6 hours  lactobacillus acidophilus 1 Tablet(s) Oral <User Schedule>  levothyroxine 125 MICROGram(s) Oral <User Schedule>  lidocaine   4% Patch 4 Patch Transdermal every 24 hours  melatonin 12 milliGRAM(s) Oral <User Schedule>  nystatin Powder 1 Application(s) Topical every 8 hours  pantoprazole  Injectable 40 milliGRAM(s) IV Push every 12 hours  predniSONE  Solution 5 milliGRAM(s) Enteral Tube <User Schedule>  QUEtiapine 12.5 milliGRAM(s) Oral <User Schedule>  sodium chloride 1 Gram(s) Oral every 8 hours  sodium chloride 0.65% Nasal 1 Spray(s) Both Nostrils every 6 hours  sodium chloride 3%  Inhalation 4 milliLiter(s) Inhalation every 6 hours  vancomycin    Solution 125 milliGRAM(s) Oral every 6 hours  witch hazel Pads 1 Application(s) Topical every 12 hours    MEDICATIONS  (PRN):  acetaminophen   Oral Liquid .. 650 milliGRAM(s) Oral every 6 hours PRN Temp greater or equal to 38C (100.4F), Mild Pain (1 - 3)  aluminum hydroxide/magnesium hydroxide/simethicone Suspension 30 milliLiter(s) Enteral Tube every 4 hours PRN Dyspepsia      LABS:                        7.6    22.92 )-----------( 106      ( 18 Nov 2024 07:12 )             27.3     11-18    137  |  105  |  31[H]  ----------------------------<  73  4.6   |  11[L]  |  0.71    Ca    8.7      18 Nov 2024 07:12  Phos  3.5     11-18  Mg     2.0     11-18    TPro  5.5[L]  /  Alb  2.2[L]  /  TBili  0.9  /  DBili  x   /  AST  36  /  ALT  18  /  AlkPhos  106  11-18      Urinalysis Basic - ( 18 Nov 2024 07:12 )    Color: x / Appearance: x / SG: x / pH: x  Gluc: 73 mg/dL / Ketone: x  / Bili: x / Urobili: x   Blood: x / Protein: x / Nitrite: x   Leuk Esterase: x / RBC: x / WBC x   Sq Epi: x / Non Sq Epi: x / Bacteria: x          CAPILLARY BLOOD GLUCOSE    MICROBIOLOGY:     RADIOLOGY:  [ ] Reviewed and interpreted by me    Mode: AC/ CMV (Assist Control/ Continuous Mandatory Ventilation)  RR (machine): 18  TV (machine): 350  FiO2: 40  PEEP: 5  ITime: 1  MAP: 13  PIP: 34   Patient is a 74y old  Female who presents with a chief complaint of Patient admitted with Hypoxemia and episode of Bright red blood per rectum (17 Nov 2024 12:11)      Interval Events: Patient hypotensive overnight required Midodrine and 250 Bolus     REVIEW OF SYSTEMS:  [ ] Positive  [ ] All other systems negative  [x] Unable to assess ROS because patient is non-responsive     Vital Signs Last 24 Hrs  T(C): 36.8 (11-18-24 @ 04:54), Max: 37 (11-17-24 @ 11:45)  T(F): 98.3 (11-18-24 @ 04:54), Max: 98.6 (11-17-24 @ 11:45)  HR: 104 (11-18-24 @ 07:00) (98 - 124)  BP: 108/54 (11-18-24 @ 07:00) (79/41 - 108/54)  RR: 19 (11-18-24 @ 04:54) (18 - 19)  SpO2: 97% (11-18-24 @ 05:54) (89% - 99%)    PHYSICAL EXAM:  HEENT:   [X] Tracheostomy: #8 distal XLT cuffed Shiley   [X] PERRLA  [ ] No oral lesions  [ ] Abnormal    SKIN  [ ] No Rash  [ ] Abnormal  [X] pressure - sacral unstageable    CARDIAC  [X] Regular  [ ] Abnormal    PULMONARY  [ ] Bilateral Clear Breath Sounds  [ ] Normal Excursion  [X] Abnormal - Diffuses coarse breath sounds / decreased at bases bilaterally     GI  [X] PEG site covered w/ dry dressing ( no active driange)                [X] Soft, nondistended, nontender	  [X] Abnormal - old PEG site with small opening c/d/i,     MUSCULOSKELETAL                                   [X] Bedbound                 [ ] Abnormal    [ ] Ambulatory/OOB to chair                           EXTREMITIES                                         [ ] Normal  [X] Edema - mod generalized pitting edema                          NEUROLOGIC  [ ] Normal, non focal  [X] Focal findings: lethargic, Minimally responsive to voice, grimacing occasionally, not following commands, no purposeful movements in all extremities, minimally withdrawals to noxious stimuli.    PSYCHIATRIC  [x] Lethargic  [ ] Sedated	 [ ]Agitated    :  Mcbride: [ ] Yes, if yes: Date of Placement:                   [X] No    LINES: Central Lines [ ] Yes, if yes: Date of Placement                                     [X] No    HOSPITAL MEDICATIONS:  MEDICATIONS  (STANDING):  albuterol/ipratropium for Nebulization 3 milliLiter(s) Nebulizer every 6 hours  artificial  tears Solution 1 Drop(s) Both EYES every 6 hours  ascorbic acid 500 milliGRAM(s) Oral daily  Biotene Dry Mouth Oral Rinse 5 milliLiter(s) Swish and Spit every 6 hours  calcium carbonate 1250 mG  + Vitamin D (OsCal 500 + D) 1 Tablet(s) Oral <User Schedule>  chlorhexidine 0.12% Liquid 15 milliLiter(s) Oral Mucosa every 12 hours  chlorhexidine 4% Liquid 1 Application(s) Topical daily  dextrose 5%. 1000 milliLiter(s) (100 mL/Hr) IV Continuous <Continuous>  dextrose 5%. 1000 milliLiter(s) (50 mL/Hr) IV Continuous <Continuous>  dextrose 50% Injectable 25 Gram(s) IV Push once  dextrose 50% Injectable 12.5 Gram(s) IV Push once  diclofenac sodium 1% Gel 2 Gram(s) Topical every 6 hours  erythromycin    ethylsuccinate Suspension 40 mG/mL 300 milliGRAM(s) Oral every 8 hours  escitalopram 10 milliGRAM(s) Oral <User Schedule>  fentaNYL   Patch  12 MICROgram(s)/Hr 1 Patch Transdermal every 72 hours  fentaNYL   Patch  25 MICROgram(s)/Hr 1 Patch Transdermal every 72 hours  FIRST- Mouthwash  BLM 5 milliLiter(s) Swish and Spit every 8 hours  gabapentin Solution 250 milliGRAM(s) Oral <User Schedule>  glucagon  Injectable 1 milliGRAM(s) IntraMuscular once  hemorrhoidal Ointment 1 Application(s) Rectal at bedtime  influenza  Vaccine (HIGH DOSE) 0.5 milliLiter(s) IntraMuscular once  insulin glargine Injectable (LANTUS) 10 Unit(s) SubCutaneous <User Schedule>  insulin lispro (ADMELOG) corrective regimen sliding scale   SubCutaneous every 6 hours  lactobacillus acidophilus 1 Tablet(s) Oral <User Schedule>  levothyroxine 125 MICROGram(s) Oral <User Schedule>  lidocaine   4% Patch 4 Patch Transdermal every 24 hours  melatonin 12 milliGRAM(s) Oral <User Schedule>  nystatin Powder 1 Application(s) Topical every 8 hours  pantoprazole  Injectable 40 milliGRAM(s) IV Push every 12 hours  predniSONE  Solution 5 milliGRAM(s) Enteral Tube <User Schedule>  QUEtiapine 12.5 milliGRAM(s) Oral <User Schedule>  sodium chloride 1 Gram(s) Oral every 8 hours  sodium chloride 0.65% Nasal 1 Spray(s) Both Nostrils every 6 hours  sodium chloride 3%  Inhalation 4 milliLiter(s) Inhalation every 6 hours  vancomycin    Solution 125 milliGRAM(s) Oral every 6 hours  witch hazel Pads 1 Application(s) Topical every 12 hours    MEDICATIONS  (PRN):  acetaminophen   Oral Liquid .. 650 milliGRAM(s) Oral every 6 hours PRN Temp greater or equal to 38C (100.4F), Mild Pain (1 - 3)  aluminum hydroxide/magnesium hydroxide/simethicone Suspension 30 milliLiter(s) Enteral Tube every 4 hours PRN Dyspepsia      LABS:                        7.6    22.92 )-----------( 106      ( 18 Nov 2024 07:12 )             27.3     11-18    137  |  105  |  31[H]  ----------------------------<  73  4.6   |  11[L]  |  0.71    Ca    8.7      18 Nov 2024 07:12  Phos  3.5     11-18  Mg     2.0     11-18    TPro  5.5[L]  /  Alb  2.2[L]  /  TBili  0.9  /  DBili  x   /  AST  36  /  ALT  18  /  AlkPhos  106  11-18      Urinalysis Basic - ( 18 Nov 2024 07:12 )    Color: x / Appearance: x / SG: x / pH: x  Gluc: 73 mg/dL / Ketone: x  / Bili: x / Urobili: x   Blood: x / Protein: x / Nitrite: x   Leuk Esterase: x / RBC: x / WBC x   Sq Epi: x / Non Sq Epi: x / Bacteria: x          CAPILLARY BLOOD GLUCOSE    MICROBIOLOGY:     RADIOLOGY:  [ ] Reviewed and interpreted by me    Mode: AC/ CMV (Assist Control/ Continuous Mandatory Ventilation)  RR (machine): 18  TV (machine): 350  FiO2: 40  PEEP: 5  ITime: 1  MAP: 13  PIP: 34

## 2024-11-18 NOTE — PROGRESS NOTE ADULT - PROBLEM SELECTOR PLAN 5
LDL goal <70 due to DM  Pt LDL NA  Order fasting lipid if not recently done  Statin as per primary team. Not getting statin at this time   Manage per primary team   F/u levels as out pt      Contact via [employer] Teams during business hours  To reach covering provider access AMION via sunrise tools  For Urgent matters/after-hours/weekends/holidays please page endocrine fellow on call   For nonurgent matters please email [email or web address]    Please note that this patient may be followed by different provider tomorrow.  Notify endocrine 24 hours prior to discharge for final recommendations

## 2024-11-18 NOTE — PROGRESS NOTE ADULT - PROBLEM SELECTOR PLAN 5
- Patient on Lantus and Humalog at home    - dc TF due to J tube issue and possible aspiration  - dc Admelog (11/11)  - Lantus decreased 10 --> 5 by endo (11/12)  - started D5 @ 50 (11/12)  - c/w ISS  - Endocrine following - Patient on Lantus and Humalog at home    - Christina d/cd 11/11  - Lantus decreased 10 --> 5 by gilles (11/18)  - Now on D5 w/ Sodium Bicarb  - c/w ISS  - Endocrine following

## 2024-11-18 NOTE — PROGRESS NOTE ADULT - ASSESSMENT
73 yo F PMH T2DM on insulin, HTN, HLD, hypothyroidism, RA on Prednisone, Fibromyalgia, cerebral aneurysm s/p repair, chronic hypercapnic respiratory failure s/p trach (vent dependent) and chronic PEG 2022, recurrent C. diff infection (follows with Dr. Newman), persistent GJ tube leaks now s/p GC fistula repair 8/12 BIBEMS on 10/7 with daughter for respiratory distress, hypoxia to 88%.  Pt also noted to have rectal bleeding week prior to presentation requiring transfusion 5 days ago in ED as per daughter. Daughter also reports brownish discharge from G-J tube. In ED, pt afebrile, fiO2 96-98% on 40% on ventilator.  Chest X-ray w/ opacification of right lung concerning for possible PNA.  Admitted to RCU for further management. Sputum cxs grew PSA; treated with course of Cefepime. Patient with decreased H+H received 1 unit of PRBCS on 10/10.  10/14 EGD w/ Dr. Rosales for PEGJ exchange and clippings placed for closure of fistula site.  Persistent fevers treated with meropenem and vanc (d/c'd 10/20).  CT A/P without infectious source.  Continued fevers and persistent leukocytosis 11/1 CDiff positive - po vanco started, IV flagyl added (11/3-11/8). Head CT performed on 11/6 due to concern of lethargy no acute intracranial findings were noted; likely related to metabolic encephalopathy. Patient continued to have persistent fevers and was empirically treated with Cefepime (11/5-11/9) for CR PSA in sputum cx. CT C/A/P performed 11/6 c/w consolidative opacity LLL. Evening of 11/10-11/11, WBC 15 --> 21, procal 1 --> 43, lactate 3.2 -- pt also febrile. Infectious w/u sent 11/10 -- BCx negative, trach aspirate cx pending, UA negative. CT CAP on 11/11 showed left greater than right basilar consolidation and patchy ground glass opacity in both lungs consistent with atelectasis and/or pneumonia; Feeding tube coils in the stomach with tip within the first portion of the duodenum, No bowel obstruction. Cefepime restarted 11/11-11/15 with improvement in leukocytosis. Patient underwent Endoscopic adjustment of peg 11/13 with Dr. Rosales. XR tube check performed.     11/18: Patient clinically declining today with period of hypotension requiring initiation of midodrine, fluid boluses and Solucortef. Patient seen by ID today and was empirically initiated on Zerbaxa. Repeat CXR BLD CX, URINE CX, and Sputum cx sent. ABG performed this morning with metabolic acidosis and elevated Lactate and Procal. Vent settings adjusted TV increased to 450 and RR increased to 20. Repeat ABG performed this evening patient with worsening Acidosis attempted to increase TV for increased Min ventalation but unable to due so 2/2 elevated PEAK Pressures of 50; VT dec back to 450 but RR increased to 28 based on last ABG. Patient initiated on D5W w/ Sodium bicarbonate. Will repeat ABG At 8 pm.Patient seen by Dental due to concern of possible Dental infection; no obvious source of abscess noted placed on Triamcinolone paste for possible pain to gums and ulceration on lip. Patient seen by Palliative care during the day case d/w daughter and  pt remains DNR but with all other medical management currently. Daughter called back this evening to re-discuss GOC including if she continues to decompensate the possible  need for pressors or transfer to ICU if continues to decline. Daughters preference would be that she remains in RCU but has not yet agreed to holding pressors if indicated; she will discuss with her father and call back to let us know her decision. As per last discussion with family will continue all medical management at this time and if change in clinical status daughter would like to be called to make decisions at that time.

## 2024-11-18 NOTE — PROGRESS NOTE ADULT - PROBLEM SELECTOR PLAN 10
- Patient on Sodium Chloride tabs prior to admission   - Remains on small Volumes of free h20 flushes to prevent J -Tube from clogging  -  NACL Tabs increased 1 gram q 8 hrs   - Monitor BMP - Patient on Sodium Chloride tabs prior to admission   - Remains on small Volumes of free h20 flushes to prevent J -Tube from clogging  -  NACL Tabs 1 gram q 8 hrs   - Monitor BMP

## 2024-11-18 NOTE — PROGRESS NOTE ADULT - PROBLEM SELECTOR PLAN 1
Inpatient Plan:  - Please monitor blood glucose values q 6 hours while on tube feeding or NPO  - Decrease Lantus 5 units @0600  - Hold standing Admelog for now   - Continue with moderate correction scale q 6 hours while on 24 hour tube feeding   -please keep hypoglycemia protocol in place     Discharge Plan:  - TBD depending on insulin requirement and discharge tube feeding regimen (Bolus vs continuous tube feeding)   - If bolus tube feeding > Lantus plus Humalog   - Patient should have BGs checked 4x a day while on TFs.    Daughter aware to contact Endocrinologist if BG <70mg/dL x1 or >200mg/dL consistently or >400mg/dL x1.   - Followup with Dr Ruth: [facility]: [address] [place] [address]. [address]. [place] [address]. Tel: [phone]- Telemedicine- daughter to schedule.   - Make sure pt has Rx for all DM supplies and insulin

## 2024-11-18 NOTE — PROGRESS NOTE ADULT - NS ATTEND AMEND GEN_ALL_CORE FT
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 Found to have Pseudomonas aeruginosa in sputum culture and urine culture with ESBL E. coli s/p course of meropenem. Course complicated by antibiotic-associated diarrhea.       On evaluation  this AM pt w/ episodes of hypotension and signs of metabolic derangements and found to have very high lactate and worsening acidosis. Primary c/f sepsis given stability in hb and her hx of multiple resistant infections  Vent adjusted and ultimately requiring BP support w/ midodrine and started on bicarb gtt    Overall prognosis unfortunately very worrisome for passing away this admission. Team has been discussing w/  and daughter who understand and considering comfort care but for now would like cont tx            - remains minimally interactive, minimize any sedatives   - continue ventilation-  increased settings given her severe acidosis   - - cont Duonebs + 3% q6h for ACT  - cont PO Vanc for Cdiff  - given worsening septic picture now on Zerbaxa, f/u ID reccs    - dental eval performed to eval oral source for infection and felt to not be the source of sepsis  - cont monitoring blood gas and clinically  - htn regimen d/c and now on midodrine for lower trending BP  - monitor i/s and cont bicarb gtt  - Dr. Vázquez (GI) repositioned PEJ endoscopically 11/13; TFs resumed and tolerating w/o issue, will slowly increase TFs  - Anemia multifactorial, hemorrhoids, AOCD. s/p EGD without active bleeding, transfuse for Hb < 7  - cont to monitor FS, now on stress dose steroids  (for RA,on home prednisone 5 mg daily )  - cont wound care to sacrum for decub (also likely moisture related rash areas on back)   - DVT ppx- only scd for now given significant anemia a few days ago

## 2024-11-18 NOTE — PROGRESS NOTE ADULT - PROBLEM SELECTOR PLAN 3
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  No focal intra-abdominal/pelvic or subcutaneous collections.  - Occult 10/7: Positive  - Transfused on 10/10 and 10/30 and 11/12with appropriate response in H+H  - heme consult appreciated

## 2024-11-18 NOTE — PROGRESS NOTE ADULT - ASSESSMENT
75 yo woman PMH T2DM on insulin, HTN, HLD, hypothyroidism, RA on Prednisone, Fibromyalgia, cerebral aneurysm s/p repair, chronic hypercapnic respiratory failure s/p trach (vent dependent) and chronic PEG 2022, recurrent C. diff infection , persistent GJ tube leaks now s/p GC fistula repair 8/12  admitted 10/7/24 with resp distress and found to have RML opacity     Antibiotics  Ceftriaxone 10/7  Azithro 10/7  Cefepime 10/7--> 10/12  meropenem 10/15 --> 10/23  IV Vanco 10/17 --> 10/21  Vanco via NGT 10/24; 11/1-->  Metronidazole 11/3 --> 11/7  Cefepime 11/5 --> 11/10; 11/11--> 11/15  Erythromycin 11/11-->  Ceftolozane, tazobactam 11/18 -->      10/10 melena, anemia, blood tx  10/14 EGD for GJ exchange and piror PEG site closure  One benign-appearing, intrinsic moderate stenosis was found in the upper third of the        esophagus. The stenosis was traversed.       The cardia and gastric fundus were normal.       A gastric tube with tip in the jejunum was found in the gastric body. The PEG required        removal because it was leaking. The J tube extension (12F) was removed first. An externally        removable 24 Fr EndoVive Safety gastrostomy tube was lubricated. The guide wire was passed        through the existing G-tube port and snared endoscopically. The endoscope and snare were then        removed, pulling the wire out through the mouth. The g-tube was tied to the guidewire, pulled        through the mouth into the stomach and then pulled out from the stomach through the skin. The        bumper was attached to the gastrostomy tube. The feeding tube was then cut to an appropriate        length. The final position of the gastrostomy tube was confirmed by relook endoscopy, and        skin marking noted to be 3 cm at the external bumper. The final tension and compression of        the abdominal wall by the PEG tube and external bumper were checked and revealed that the        bumper was moderately tight and mildly deforming the skin. The J tube extension (12 F        EndoVive Jejunal feeding tube) was advanced into the stomach. The tip of the J tube had a        loop thread that was captured with a Resolution clip. The thread and the J tube extension        were advanced to the proximal jejunum, and the endoclip along with the tip of the J tube was        attached to the wall securely. The J tube extension was capped, and the tube site was cleaned        and dressed.       A small fistula was found on the anterior wall of the gastric body related to prior G tube        site. Coagulation of tissue near the fistula using argon plasma at 1.2 liters/minute and 35        peraza was successful. To closure the gastrocutaneous fistula, four hemostatic clips were        successfully placed (MR conditional, two 16 mm DuraClip and 2 Mantis clips.       The examined duodenum was normal.    10/14, 10/15 Fever, transient hypoxia post procedure  10/15 ProCalcitonin = 8.48 suggestive of bacterial process; BCx x2 NGTD; Trach: Pseudomonas   - though benign mg stain  10;16 Leukocytosis WBC = 14.26  10/17 fever, hypotension, abd distension, no diarrhea noted this am WBC = 15.02; Rising Procalcitonin = 38.49; Obstructed J tube   10/18  lost IV access re-gained  - CT scan late this afternoon  WBC = 8.68; Rising Procalcitonin >100  10/18 imaging suggests multifocal pneumonia  10/21 improved chest exam, Procalcitonin level considerably decreased, decreased FiO2  - afebrile since 10/18  10/21 UGI endoscopy done  Findings:       The esophagus was normal. A fistula was found on the anterior wall of the gastric body.       There was evidence of a gastrostomy present in the gastric body. The patient was placed in        the supine position. The endoscope was advanced to the jejunum and the prior placed J tube        with loop attached to the wall and the loop thread was cut by endoclip. The J tube and the G        tube were removed. The gastrostomy fistula was utilized and an Avanos 22 F        Gastrostomy/Jejunal tube was advanced. The jejunal tip was advanced through the pylorus and        into the duodenum. The endoscope was then advanced to the duodenum and the loop thread at the        tip of the J tube was captured and advanced to the proximal jejunum. The loop thread was        clipped to wall by a Gifi Resolution clip. The J tube flushed easily and the G        tube decompress the abdomen easily. The internal balloon was insufflated with 10 cc of water        to hold the GJ tube in place. The outside marking was at 3.  10/23  no distress, liquid stools noted  - Meropenem discontinued  GI PCR NEG  10/24  persistent diarrhea  Cdiff NEG; enteral vanco stopped and Immodium provided  10/28 low grade temps, mild leukocytosis  10/30 blood transfusion  11/1  fever  +Cdiff  11/3 continued fever  - +BC Stph epi most likely procurement contaminant,  modest diarrhea reported  - daughter described increased diarrhea in evenings - Pseudomonas airway colonization is long term, no suggestion of pneumonia at present, sacral decub remains superifical will granulated without signs of infection  11/4  - fever, abnormal chest exam though FIO2 still 30%  rectal tube inserted for increased diarrhea  11/5 lethargy, fever, leukocytosis, increased lactate, hyponatremia  11/6 sputum gram stain suggests persistent Pseudomonas colonization  - continued fever, leukocytosis  11/7 unclear if LLL consolidation represents infection. Repeat Procalcitonin  11/7= 2.92 decreased  11/8 continued fevers  +diarrhea, lethargy continues  to complete Cefepime for LLL pneumonia v atelectasis tomorrow 11/9 11/11 increased fever with increased WBC and increased Procalcitonin off Cefepime noted, which was resumed  Concern if feed is refluxing back to the stomach versus J tube now in the stomach.     imaging show G tube in 1st part of duodenum. there are bibasilar L>R consolidations - likely continual reaspiration  11/12 persistent leukocytosis  11/13, decreased height of fever, decreased leukocytosis, Procalcitonin decreased to 52% of the 11/11 value suggesting good therapeutic response. s/p endoscopic replacement of feeding tube into jejunum  - Rectal tube came out  11/15 sleepy  - leukocytosis almost completely resolved      11/18 disturbing decompensation with clinical toxicity, unresponsive, increased leukocytosis, lactic acidosis   extensive antibiotic exposure with chronic Pseudomonas colonization of upper airway makes antibiotic resistance likely    Issues  recurrent Cdiff  cerebral aneurysm s/p repair  chronic hypercapnic respiratory failure s/p trach (vent dependent) and chronic PEG 2022  anemia  - had iron deficiency  Pseudomonas aeruginosa upper airway colonization  (most recent isolate Resist to Cipro/Levo)  T2DM on insulin  10.14.24 -A1C: 5.8%  HTN  HLD  hypothyroidism  RA on Prednisone  Fibromyalgia  debility  sacral ulcer largely healed  malnourished/chronic catabolic state  aspiration      Suggest  Continue vanco 125 q 6 hours via J tube 11/1 --> --> 11/20   Ceftolozane, tazobactam ordered   I will follow up on culture results    discussed with daughter at bedside  discussed with RCU team  PharmD assistance appreciated

## 2024-11-18 NOTE — CHART NOTE - NSCHARTNOTEFT_GEN_A_CORE
NUTRITION FOLLOW UP NOTE    PATIENT SEEN FOR: follow up     SOURCE: [] Patient  [x] Current Medical Record  [x] Nursing  [] Family/support person at bedside  [x] Patient unavailable/inappropriate  [] Other:     CHART REVIEWED/EVENTS NOTED.  [] No changes to nutrition care plan to note  [x] Nutrition Status:  -PMH T2DM  -s/p trach (vent dependent) and chronic PEG   -s/p G-J tube exchange by GI on 10/21. J tube for meds and feeds per team  -s/p Endoscopic Peg adjustment . Tube Feeds resumed at 40cc/hr and then changed back to 80cc/hr x 12h.     Diet, NPO with Tube Feed:   Tube Feeding Modality: Jejunostomy  365Scores Peptide 1.5 (KFPEPT1.5RTH)  Total Volume for 24 Hours (mL): 960  Continuous  Until Goal Tube Feed Rate (mL per Hour): 80  Tube Feed Duration (in Hours): 12  Tube Feed Start Time: 06:00  Free Water Flush  Pump   Rate (mL per Hour): 50   Frequency: Every Hour  Free Water Flush Instructions:  50ml free water flushes Q1hr  Alfred(7 Gm Arginine/7 Gm Glut/1.2 Gm HMB     Qty per Day:  2  No Carb Prosource (1pkg = 15gms Protein)     Qty per Day:  1  Banatrol TF     Qty per Day:  1/2 packet per daily (24 @ 12:06) [Active]    CURRENT DIET ORDER IS:  [] Appropriate:  [] Inadequate:  [x] Other: see below for recommendations     NUTRITION INTAKE/PROVISION:  [] PO:  [x] Enteral Nutrition: 365Scores Peptide 1.5 80ml x 12 hours + Alfred BID + No carb Prosource daily + 1/2 packet Banatrol daily providing at 100% provision: 1768kcal (32.5kcal/kg), 92g protein (1.7g/kg) and 672ml free water.   -->10/24/24: Daughter continuing to request to continue 80ml/hr of tube feeding be provided x 12 hours.  [] Parenteral Nutrition:    ANTHROPOMETRICS:  Drug Dosing Weight  Height (cm): 149.9 (21 Oct 2024 18:49)  Weight (kg): 54.4 (21 Oct 2024 18:49)  BMI (kg/m2): 24.2 (21 Oct 2024 18:49)  BSA (m2): 1.48 (21 Oct 2024 18:49)  10/16 62.5kg   Daily Weight in k.6 ()  Daily Weight in k.3 () - ? accuracy. request rewissa   RD will continue to monitor trends.     MEDICATIONS  (STANDING):  ascorbic acid  calcium carbonate 1250 mG  + Vitamin D (OsCal 500 + D)  dextrose 5%.  dextrose 5%.  dextrose 50% Injectable  dextrose 50% Injectable  glucagon  Injectable  insulin lispro (ADMELOG) corrective regimen sliding scale  levothyroxine  midodrine  pantoprazole  Injectable  predniSONE  Solution  sodium chloride  vancomycin    Solution    Pertinent Labs:  @ 07:12: Na 137, BUN 31[H], Cr 0.71, BG 73, K+ 4.6, Phos 3.5, Mg 2.0, Alk Phos 106, ALT/SGPT 18, AST/SGOT 36, HbA1c --    A1C with Estimated Average Glucose Result: 5.8 % (10-14-24 @ 05:08)  A1C with Estimated Average Glucose Result: 6.4 % (24 @ 07:32)    Finger Sticks:  POCT Blood Glucose.: 128 mg/dL ( @ 11:58)  POCT Blood Glucose.: 91 mg/dL ( @ 05:28)  POCT Blood Glucose.: 196 mg/dL ( @ 23:25)  POCT Blood Glucose.: 231 mg/dL ( @ 18:07)    NUTRITIONALLY PERTINENT MEDICATIONS/LABS:  [x] Reviewed  [x] Relevant notes on medications/labs:  -iron low (10/30/24)   -synthroid ordered    EDEMA:  [x] Reviewed  [] Relevant notes:    GI/ I&O:  [x] Reviewed  [] Relevant notes:  [x] Other: c.diff positive. rectal tube came out  per flow sheets    SKIN:   per flow sheets:  sacrum stage IV   left fifth toe suspected deep tissue injury   left and right hallux medial suspected deep tissue injury    ESTIMATED NEEDS:  Based on: dosing weight 54.4kg   30-35kcal/kg 1632-1904kcal/day  1.4-1.8g/kg 76-98g protein/day  defer fluid needs to team     NUTRITION DIAGNOSIS:  [x] Prior Dx: increased nutrient needs  [] New Dx:    EDUCATION:  [] Yes:  [x] Not appropriate/warranted  10/15/24 Nutrition Chart Note: Spoke with pt daughter Marysol Spann over phone ([phone]) to confirm pt home tube feeding regimen. Per daughter, pt receiving Casie Fyusion Peptide 1.5, 3 bottles/day. 80ml/hr x 12 hours.     NUTRITION CARE PLAN:  1. Diet: continue Casie Fyusion Peptide 1.5 per team for a total volume of 960ml. Rate/hr deferred to team. Can continue banatrol to aid in stool bulking, prosource to aid in added protein/calories for wound healing and Alfred to aid in wound healing.   -->Insulin regimen deferred to team/endocrinology   -->Defer free water flush to team    2. Continue vitamin/mineral regimen as ordered per team   3. Consider iron supplement if no contraindications      [] Achieved - Continue current nutrition intervention(s)  [] Current medical condition precludes nutrition intervention at this time.    MONITORING AND EVALUATION:   RD remains available upon request and will follow up per protocol.    Rufina Morris, MS, RD, CDN / Teams

## 2024-11-18 NOTE — CONSULT NOTE ADULT - CONSULT REASON
GOC
hearing loss
hypotension
GJ replacement
sacral/bilateral wound, former PEG tube site with leakage
Dental consulted to rule out odontogenic infection.
Type 2 DM management with hyperglycemia
anemia
eye drop usage
pneumonia

## 2024-11-18 NOTE — CONSULT NOTE ADULT - SUBJECTIVE AND OBJECTIVE BOX
Patient is a 74y old  Female admitted with Hypoxemia and episode of Bright red blood per rectum. Dental medicine consulted to rule out odontogenic infection. Pt. is on a ventilator and is not able to respond or follow instructions.     HPI: 73 yo F PMH T2DM on insulin, HTN, HLD, hypothyroidism, RA on Prednisone, Fibromyalgia, cerebral aneurysm s/p repair, chronic hypercapnic respiratory failure s/p trach (vent dependent) and chronic PEG 2022, recurrent C. diff infection (follows with Dr. Newman), persistent GJ tube leaks now s/p GC fistula repair 8/12 BIBEMS with daughter for respiratory distress, hypoxia to high 80s.  Pt also noted to have rectal bleeding this past week requiring transfusion 5 days ago in ED as per daughter.  In ED, pt afebrile, fiO2 96-98% on 40% on ventilator.  Chest Xray w/ opacification of right lung concerning for possible PNA.  Admitted to RCU for further management.     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    H/O tracheostomy    PEG (percutaneous endoscopic gastrostomy) status    MEDICATIONS  (STANDING):  albuterol/ipratropium for Nebulization 3 milliLiter(s) Nebulizer every 6 hours  artificial  tears Solution 1 Drop(s) Both EYES every 6 hours  ascorbic acid 500 milliGRAM(s) Oral daily  Biotene Dry Mouth Oral Rinse 5 milliLiter(s) Swish and Spit every 6 hours  calcium carbonate 1250 mG  + Vitamin D (OsCal 500 + D) 1 Tablet(s) Oral <User Schedule>  ceftolozane/tazobactam IVPB 3000 milliGRAM(s) IV Intermittent every 8 hours  chlorhexidine 0.12% Liquid 15 milliLiter(s) Oral Mucosa every 12 hours  chlorhexidine 4% Liquid 1 Application(s) Topical daily  dextrose 5% 1000 milliLiter(s) (75 mL/Hr) IV Continuous <Continuous>  dextrose 5%. 1000 milliLiter(s) (100 mL/Hr) IV Continuous <Continuous>  dextrose 5%. 1000 milliLiter(s) (50 mL/Hr) IV Continuous <Continuous>  dextrose 50% Injectable 25 Gram(s) IV Push once  dextrose 50% Injectable 12.5 Gram(s) IV Push once  diclofenac sodium 1% Gel 2 Gram(s) Topical every 6 hours  escitalopram 10 milliGRAM(s) Oral <User Schedule>  fentaNYL   Patch  12 MICROgram(s)/Hr 1 Patch Transdermal every 72 hours  fentaNYL   Patch  25 MICROgram(s)/Hr 1 Patch Transdermal every 72 hours  FIRST- Mouthwash  BLM 5 milliLiter(s) Swish and Spit every 8 hours  gabapentin Solution 250 milliGRAM(s) Oral <User Schedule>  glucagon  Injectable 1 milliGRAM(s) IntraMuscular once  hemorrhoidal Ointment 1 Application(s) Rectal at bedtime  hydrocortisone sodium succinate Injectable 50 milliGRAM(s) IV Push every 8 hours  influenza  Vaccine (HIGH DOSE) 0.5 milliLiter(s) IntraMuscular once  insulin lispro (ADMELOG) corrective regimen sliding scale   SubCutaneous every 6 hours  lactobacillus acidophilus 1 Tablet(s) Oral <User Schedule>  levothyroxine 125 MICROGram(s) Oral <User Schedule>  lidocaine   4% Patch 4 Patch Transdermal every 24 hours  midodrine 10 milliGRAM(s) Oral every 8 hours  nystatin Powder 1 Application(s) Topical every 8 hours  pantoprazole  Injectable 40 milliGRAM(s) IV Push every 12 hours  sodium chloride 1 Gram(s) Oral every 8 hours  sodium chloride 0.65% Nasal 1 Spray(s) Both Nostrils every 6 hours  triamcinolone 0.1% Oral Paste 1 Application(s) Topical every 8 hours  vancomycin    Solution 125 milliGRAM(s) Oral every 6 hours  witch hazel Pads 1 Application(s) Topical every 12 hours  MEDICATIONS  (PRN):  acetaminophen   Oral Liquid .. 650 milliGRAM(s) Oral every 6 hours PRN Temp greater or equal to 38C (100.4F), Mild Pain (1 - 3)  aluminum hydroxide/magnesium hydroxide/simethicone Suspension 30 milliLiter(s) Enteral Tube every 4 hours PRN Dyspepsia    Allergies  walnut (Unknown)  metronidazole (Rash)  Lyrica (Unknown)  penicillin (Unknown)  Pineapple (Unknown)  Tagamet (Unknown)  heparin (Unknown)  Pecans (Unknown)  Hazelnut (Unknown)  meropenem (Rash)    Intolerances      *Last Dental Visit: Unknown.      EOE:              ( - ) trismus             ( - ) lymphadenopathy             ( - ) swelling             (  - ) asymmetry             ( + ) palpation of lower mandible, pt. winced but could not localize the area of discomfort.             ( - ) dyspnea             ( - ) dysphagia             ( - ) loss of consciousness      IOE:  Permanent dentition: multiple missing teeth, multiple root tips. Crowns have wear facets likely due to bruxism. Maxillary and mandibular teeth are tilted anteriorly, likely causing traumatic ulceration of the tongue and mucosa.           tongue/FOM: Traumatic ulceration on left dorsal tongue.           labial/buccal mucosa: Traumatic ulceration on left labial mucosa apical to tooth #22.           ( - ) percussion           ( - ) palpation           ( - ) swelling            ( - ) abscess           ( - ) sinus tract  Dentition: Missing posterior molars.  Mobility: None.     *DENTAL RADIOGRAPHS: None, patient was not transportable as per med team.    *ASSESSMENT: Bedside Exam: No evidence of swelling, abscess or fistula. However, odontogenic infection cannot be fully ruled out without dental radiographs. No aspiration risk at this time.    PROCEDURE: Limited clinical exam at bedside. Pt. was non-responsive. Discussed all findings with med team, all questions answered.        RECOMMENDATIONS:  1) Apply triamcinolone acetonide paste to reduce discomfort of traumatic ulcerations.   2) Dental F/U with outpatient dentist or [facility] ([address] [place] [address]; phone: [phone]) for comprehensive dental care.     Marcie Mckoy, pager #[phone]

## 2024-11-18 NOTE — PROGRESS NOTE ADULT - PROBLEM SELECTOR PLAN 9
- Patient has known hx of prior G-Tube stoma leak   - S/p EGD for closure of Gastric Fistula (8/12) w/ Total of 3 Mantis clips and two 22 mm Microtech clips by GI Dr Rosales   - Old stoma peg site with mild clear drainage s/p repair of fistula  - 10/14 G-J exchanged by GI and clips applied to fistula site  - 10/17 PEGJ clogged  - 10/21 PEGJ revision done, tolerating enteral feeds  - Both feeds and medications are now give through feeding/J-port  - G-port is placed to continuous bag drainage for decompression  - Continue Maalox TID for gaseous distention  - Tube study requested to evaluate for J-tube migration 11/9 -- showed tip in duodenum. Increased output from J tube, possible aspiration -- asked GI to come reassess; ECG for QT, started erythromycin (reglan contraindicated); dc TF, dc Admelog (c/w Lantus), started d5   - CT CAP on 11/11 shows feeding tube coils in the stomach with tip within the first portion of the duodenum.. No bowel obstruction.   - Rectal tube displaced when pt turned -- new rectal tube placed 11/12  - Asked Dr. Rosales to adjust J tube 11/12 - Patient has known hx of prior G-Tube stoma leak   - S/p EGD for closure of Gastric Fistula (8/12) w/ Total of 3 Mantis clips and two 22 mm Microtech clips by GI Dr Rosales   - Old stoma peg site with mild clear drainage s/p repair of fistula  - 10/14 G-J exchanged by GI and clips applied to fistula site  - 10/17 PEGJ clogged  - 10/21 PEGJ revision done, tolerating enteral feeds  - Tube study requested to evaluate for J-tube migration 11/9 -- showed tip in duodenum.  - CT CAP on 11/11 shows feeding tube coils in the stomach with tip within the first portion of the duodenum  - G- J Tube repositioned on 11/13 via upper endoscopy   - Both feeds and medications to be given through feeding/J-port  - G-port is placed to continuous bag drainage for decompression  - Continue Maalox TID for gaseous distention  - GI  is following

## 2024-11-18 NOTE — PROGRESS NOTE ADULT - PROBLEM SELECTOR PLAN 12
- Patients daughter updated at bedside by RCU Team today  - Daughter states she will reach out to [facility] to check availability - Patients daughter and  updated at bedside by RCU Team today  - Patient seen by Palliative care today remains DNR But continuing other medical management at this time  - [facility] rediscussed this evening with Daughter given worsening Acidosis; d/w her high likelihood of cardiac arrest and possible need for pressors if becomes hemodynamically unstable. Her Daughter would prefer at the moment that she remain in RCU But also would liked to be called if change in clinical status to determine what medical modalities she would want performed at that time if indicated. D/w her updating MOLST Regarding pressors she will discuss with her father and has not made a decision yet at this time.

## 2024-11-19 NOTE — PROGRESS NOTE ADULT - NS ATTEND AMEND GEN_ALL_CORE FT
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 Found to have Pseudomonas aeruginosa in sputum culture and urine culture with ESBL E. coli s/p course of meropenem. Course complicated by antibiotic-associated diarrhea.         BP stabilized since yesterday with aggressive ventilatory and metabolic resuscitation attempts but overall condition remains very poor.  Overall prognosis unfortunately very worrisome for passing away this admission. Team has been discussing w/  and daughter who understand and considering comfort care but for now would like cont tx       - remains minimally interactive, minimize any sedatives   - continue ventilation at increased settings given her  acidosis   - cont Duonebs + 3% q6h for ACT  - cont PO Vanc for Cdiff  - given worsening septic picture remains on Zerbaxa, f/u ID reccs    - dental eval performed to eval oral source for infection and felt to not be the source of sepsis  - cont monitoring blood gas and clinically  - htn regimen d/c and now on midodrine for   BP support  - monitor i/s and cont bicarb gtt  - Anemia multifactorial, hemorrhoids, AOCD. s/p EGD without active bleeding, transfuse for Hb < 7  - cont to monitor FS, now on stress dose steroids  (for RA,on home prednisone 5 mg daily )  - cont wound care to sacrum for decub (also likely moisture related rash areas on back)   - DVT ppx- only scd for now given significant anemia a few days ago

## 2024-11-19 NOTE — PROGRESS NOTE ADULT - SUBJECTIVE AND OBJECTIVE BOX
Seen earlier today     Chief Complaint: Diabetes Mellitus follow up    INTERVAL HX: As per RN, tube feeding changed to 40 ml/hr for 24 hours ( [employer] peptide 1.5 ) this am. Noted sodium bicarb gtt in d5w at 125ml/hr started and on hydrocortisone 50 mg q 8 hours. Last 24 hour BGs 203-323, above goal. noted pt received reduced dose of lantus 5 units this am.       Review of Systems:  Unable to obtain ROS as pt was asleep at the time of visit     Allergies    walnut (Unknown)  metronidazole (Rash)  Lyrica (Unknown)  penicillin (Unknown)  Pineapple (Unknown)  Tagamet (Unknown)  heparin (Unknown)  Pecans (Unknown)  Hazelnut (Unknown)  meropenem (Rash)    Intolerances      MEDICATIONS  (STANDING):  albuterol/ipratropium for Nebulization 3 milliLiter(s) Nebulizer every 6 hours  artificial  tears Solution 1 Drop(s) Both EYES every 6 hours  ascorbic acid 500 milliGRAM(s) Oral daily  Biotene Dry Mouth Oral Rinse 5 milliLiter(s) Swish and Spit every 6 hours  calcium carbonate 1250 mG  + Vitamin D (OsCal 500 + D) 1 Tablet(s) Oral <User Schedule>  ceftolozane/tazobactam IVPB 3000 milliGRAM(s) IV Intermittent every 8 hours  chlorhexidine 0.12% Liquid 15 milliLiter(s) Oral Mucosa every 12 hours  chlorhexidine 4% Liquid 1 Application(s) Topical daily  dextrose 5% 1000 milliLiter(s) (125 mL/Hr) IV Continuous <Continuous>  dextrose 5%. 1000 milliLiter(s) (100 mL/Hr) IV Continuous <Continuous>  dextrose 5%. 1000 milliLiter(s) (50 mL/Hr) IV Continuous <Continuous>  dextrose 50% Injectable 25 Gram(s) IV Push once  dextrose 50% Injectable 12.5 Gram(s) IV Push once  diclofenac sodium 1% Gel 2 Gram(s) Topical every 6 hours  escitalopram 10 milliGRAM(s) Oral <User Schedule>  fentaNYL   Patch  12 MICROgram(s)/Hr 1 Patch Transdermal every 72 hours  fentaNYL   Patch  25 MICROgram(s)/Hr 1 Patch Transdermal every 72 hours  FIRST- Mouthwash  BLM 5 milliLiter(s) Swish and Spit every 8 hours  gabapentin Solution 250 milliGRAM(s) Oral <User Schedule>  glucagon  Injectable 1 milliGRAM(s) IntraMuscular once  hemorrhoidal Ointment 1 Application(s) Rectal at bedtime  hydrocortisone sodium succinate Injectable 50 milliGRAM(s) IV Push every 8 hours  influenza  Vaccine (HIGH DOSE) 0.5 milliLiter(s) IntraMuscular once  insulin lispro (ADMELOG) corrective regimen sliding scale   SubCutaneous every 6 hours  insulin lispro Injectable (ADMELOG) 5 Unit(s) SubCutaneous every 6 hours  lactobacillus acidophilus 1 Tablet(s) Oral <User Schedule>  levothyroxine 125 MICROGram(s) Oral <User Schedule>  lidocaine   4% Patch 4 Patch Transdermal every 24 hours  midodrine 10 milliGRAM(s) Oral every 8 hours  nystatin Powder 1 Application(s) Topical every 8 hours  pantoprazole  Injectable 40 milliGRAM(s) IV Push every 12 hours  sodium chloride 1 Gram(s) Oral every 8 hours  sodium chloride 0.65% Nasal 1 Spray(s) Both Nostrils every 6 hours  triamcinolone 0.1% Oral Paste 1 Application(s) Topical every 8 hours  vancomycin    Solution 125 milliGRAM(s) Oral every 6 hours  witch hazel Pads 1 Application(s) Topical every 12 hours        hydrocortisone sodium succinate Injectable   50 milliGRAM(s) IV Push (11-19-24 @ 13:39)   50 milliGRAM(s) IV Push (11-19-24 @ 06:31)   50 milliGRAM(s) IV Push (11-18-24 @ 22:56)    insulin glargine Injectable (LANTUS)   5 Unit(s) SubCutaneous (11-19-24 @ 06:07)    insulin glargine Injectable (LANTUS)   3 Unit(s) SubCutaneous (11-19-24 @ 11:41)    insulin lispro (ADMELOG) corrective regimen sliding scale   8 Unit(s) SubCutaneous (11-19-24 @ 11:41)   4 Unit(s) SubCutaneous (11-19-24 @ 06:08)   6 Unit(s) SubCutaneous (11-19-24 @ 00:58)   4 Unit(s) SubCutaneous (11-18-24 @ 17:58)    insulin lispro Injectable (ADMELOG)   5 Unit(s) SubCutaneous (11-19-24 @ 13:36)    levothyroxine   125 MICROGram(s) Oral (11-19-24 @ 05:47)        PHYSICAL EXAM:  VITALS: T(C): 36.3 (11-19-24 @ 14:08)  T(F): 97.3 (11-19-24 @ 14:08), Max: 98.4 (11-19-24 @ 03:18)  HR: 82 (11-19-24 @ 14:08) (75 - 109)  BP: 159/77 (11-19-24 @ 14:08) (115/82 - 159/77)  RR:  (20 - 28)  SpO2:  (98% - 100%)  Wt(kg): --  GENERAL: Femal laying in bed, in NAD  Respiratory: + trach and on ventilator support  Extremities: Warm, no edema  NEURO: asleep    LABS:  POCT Blood Glucose.: 323 mg/dL (11-19-24 @ 11:34)  POCT Blood Glucose.: 218 mg/dL (11-19-24 @ 06:05)  POCT Blood Glucose.: 269 mg/dL (11-19-24 @ 00:51)  POCT Blood Glucose.: 203 mg/dL (11-18-24 @ 17:37)  POCT Blood Glucose.: 128 mg/dL (11-18-24 @ 11:58)  POCT Blood Glucose.: 91 mg/dL (11-18-24 @ 05:28)  POCT Blood Glucose.: 196 mg/dL (11-17-24 @ 23:25)  POCT Blood Glucose.: 231 mg/dL (11-17-24 @ 18:07)  POCT Blood Glucose.: 146 mg/dL (11-17-24 @ 12:17)  POCT Blood Glucose.: 130 mg/dL (11-17-24 @ 05:25)  POCT Blood Glucose.: 168 mg/dL (11-16-24 @ 23:30)  POCT Blood Glucose.: 207 mg/dL (11-16-24 @ 17:29)                          7.4    27.15 )-----------( 89       ( 19 Nov 2024 07:12 )             25.2     11-19    137  |  104  |  38[H]  ----------------------------<  193[H]  5.4[H]   |  11[L]  |  0.84    Ca    8.7      19 Nov 2024 07:12  Phos  2.8     11-19  Mg     2.0     11-19    TPro  5.6[L]  /  Alb  2.1[L]  /  TBili  1.0  /  DBili  x   /  AST  39  /  ALT  23  /  AlkPhos  113  11-19    eGFR: 74 mL/min/1.73m2 (19 Nov 2024 07:12)      Thyroid Function Tests:      A1C with Estimated Average Glucose Result: 5.8 % (10-14-24 @ 05:08)    Estimated Average Glucose: 120 mg/dL (10-14-24 @ 05:08)        Diet, NPO with Tube Feed:   Tube Feeding Modality: Jejunostomy  [employer] Peptide 1.5 (KFPEPT1.5RTH)  Total Volume for 24 Hours (mL): 960  Continuous  Until Goal Tube Feed Rate (mL per Hour): 40  Tube Feed Duration (in Hours): 24  Tube Feed Start Time: 06:00  Free Water Flush  Pump   Rate (mL per Hour): 50   Frequency: Every Hour  Free Water Flush Instructions:  50ml free water flushes Q1hr  Alfred(7 Gm Arginine/7 Gm Glut/1.2 Gm HMB     Qty per Day:  2  No Carb Prosource (1pkg = 15gms Protein)     Qty per Day:  1  Banatrol TF     Qty per Day:  1/2 packet per daily (11-19-24 @ 10:25) [Active]

## 2024-11-19 NOTE — PROGRESS NOTE ADULT - ASSESSMENT
73 yo F PMH T2DM on insulin, HTN, HLD, hypothyroidism, RA on Prednisone, Fibromyalgia, cerebral aneurysm s/p repair, chronic hypercapnic respiratory failure s/p trach (vent dependent) and chronic PEG 2022, recurrent C. diff infection (follows with Dr. Newman), persistent GJ tube leaks now s/p GC fistula repair 8/12 BIBEMS on 10/7 with daughter for respiratory distress, hypoxia to 88%.  Pt also noted to have rectal bleeding week prior to presentation requiring transfusion 5 days ago in ED as per daughter. Daughter also reports brownish discharge from G-J tube. In ED, pt afebrile, fiO2 96-98% on 40% on ventilator.  Chest X-ray w/ opacification of right lung concerning for possible PNA.  Admitted to [facility] for further management. Sputum cxs grew PSA; treated with course of Cefepime. Patient with decreased H+H received 1 unit of PRBCS on 10/10.  10/14 EGD w/ Dr. Rosales for PEGJ exchange and clippings placed for closure of fistula site.  Persistent fevers treated with meropenem and vanc (d/c'd 10/20).  CT A/P without infectious source.  Continued fevers and persistent leukocytosis 11/1 CDiff positive - po vanco started, IV flagyl added (11/3-11/8). Head CT performed on 11/6 due to concern of lethargy no acute intracranial findings were noted; likely related to metabolic encephalopathy. Patient continued to have persistent fevers and was empirically treated with Cefepime (11/5-11/9) for CR PSA in sputum cx. CT C/A/P performed 11/6 c/w consolidative opacity LLL. Evening of 11/10-11/11, WBC 15 --> 21, procal 1 --> 43, lactate 3.2 -- pt also febrile. Infectious w/u sent 11/10 -- BCx negative, trach aspirate cx pending, UA negative. CT CAP on 11/11 showed left greater than right basilar consolidation and patchy ground glass opacity in both lungs consistent with atelectasis and/or pneumonia; Feeding tube coils in the stomach with tip within the first portion of the duodenum, No bowel obstruction. Cefepime restarted 11/11-11/15 with improvement in leukocytosis. Patient underwent Endoscopic adjustment of peg 11/13 with Dr. Rosales. XR tube check performed.     11/19: Patient clinically declining on 11/18, initiated midodrine for hypotension, fluid boluses and Solucortef.  ID initiated empirically on Zerbaxa. F/U repeat CXR BLD CX, URINE CX, and Sputum cx. Continues with metabolic acidosis and elevated Lactate and Procal. Vent settings  and RR 28. Continues on D5W w/ Sodium bicarbonate. Will repeat ABG At 8 pm. Patient seen by Dental infection; no obvious source of abscess noted placed on Triamcinolone paste for possible pain to gums and ulceration on lip. Patient seen by Palliative care again today case d/w daughter and  pt remains DNR but with all other medical management currently. Daughters preference would be that she remains in RCU but has not yet agreed to holding pressors if indicated; she will discuss with her father and call back to let us know her decision. As per last discussion with family will continue all medical management at this time and if change in clinical status daughter would like to be called to make decisions at that time.

## 2024-11-19 NOTE — PROGRESS NOTE ADULT - PROBLEM SELECTOR PLAN 6
- Pt receives Enbrel injections q Thursday (will hold in setting of recent infection)  - Home prednisone dcd and changed to Solu-cortef in setting of sepsis

## 2024-11-19 NOTE — PROGRESS NOTE ADULT - ASSESSMENT
73 yo F PMH T2DM on insulin, HTN, HLD, hypothyroidism, RA on Prednisone, Fibromyalgia, cerebral aneurysm s/p repair, chronic hypercapnic respiratory failure s/p trach (vent dependent) and chronic PEG 2022, recurrent C. diff infection (follows with Dr. Newman), persistent GJ tube leaks now s/p GC fistula repair 8/12 BIBEMS with daughter for respiratory distress, hypoxia to high 80s.  Pt also noted to have rectal bleeding this past week requiring transfusion 5 days ago in ED as per daughter.  In ED, pt afebrile, fiO2 96-98% on 40% on ventilator.  Chest Xray w/ opacification of right lung concerning for possible PNA.  Admitted to [facility]CU for further management.

## 2024-11-19 NOTE — PROGRESS NOTE ADULT - NUTRITIONAL ASSESSMENT
Diet, NPO with Tube Feed:   Tube Feeding Modality: Jejunostomy  [employer] Peptide 1.5 (KFPEPT1.5RTH)  Total Volume for 24 Hours (mL): 960  Continuous  Until Goal Tube Feed Rate (mL per Hour): 40  Tube Feed Duration (in Hours): 24  Tube Feed Start Time: 06:00  Free Water Flush  Pump   Rate (mL per Hour): 50   Frequency: Every Hour  Free Water Flush Instructions:  50ml free water flushes Q1hr  Alfred(7 Gm Arginine/7 Gm Glut/1.2 Gm HMB     Qty per Day:  2  No Carb Prosource (1pkg = 15gms Protein)     Qty per Day:  1  Banatrol TF     Qty per Day:  1/2 packet per daily (11-19-24 @ 10:25) [Active]

## 2024-11-19 NOTE — PROGRESS NOTE ADULT - ASSESSMENT
73 yo woman PMH T2DM on insulin, HTN, HLD, hypothyroidism, RA on Prednisone, Fibromyalgia, cerebral aneurysm s/p repair, chronic hypercapnic respiratory failure s/p trach (vent dependent) and chronic PEG 2022, recurrent C. diff infection , persistent GJ tube leaks now s/p GC fistula repair 8/12  admitted 10/7/24 with resp distress and found to have RML opacity     Antibiotics  Ceftriaxone 10/7  Azithro 10/7  Cefepime 10/7--> 10/12  meropenem 10/15 --> 10/23  IV Vanco 10/17 --> 10/21  Vanco via NGT 10/24; 11/1-->  Metronidazole 11/3 --> 11/7  Cefepime 11/5 --> 11/10; 11/11--> 11/15  Erythromycin 11/11-->  Ceftolozane, tazobactam 11/18 -->      10/10 melena, anemia, blood tx  10/14 EGD for GJ exchange and piror PEG site closure  One benign-appearing, intrinsic moderate stenosis was found in the upper third of the        esophagus. The stenosis was traversed.       The cardia and gastric fundus were normal.       A gastric tube with tip in the jejunum was found in the gastric body. The PEG required        removal because it was leaking. The J tube extension (12F) was removed first. An externally        removable 24 Fr Endo[employer] Safety gastrostomy tube was lubricated. The guide wire was passed        through the existing G-tube port and snared endoscopically. The endoscope and snare were then        removed, pulling the wire out through the mouth. The g-tube was tied to the guidewire, pulled        through the mouth into the stomach and then pulled out from the stomach through the skin. The        bumper was attached to the gastrostomy tube. The feeding tube was then cut to an appropriate        length. The final position of the gastrostomy tube was confirmed by relook endoscopy, and        skin marking noted to be 3 cm at the external bumper. The final tension and compression of        the abdominal wall by the PEG tube and external bumper were checked and revealed that the        bumper was moderately tight and mildly deforming the skin. The J tube extension (12 F        EndoVive Jejunal feeding tube) was advanced into the stomach. The tip of the J tube had a        loop thread that was captured with a Resolution clip. The thread and the J tube extension        were advanced to the proximal jejunum, and the endoclip along with the tip of the J tube was        attached to the wall securely. The J tube extension was capped, and the tube site was cleaned        and dressed.       A small fistula was found on the anterior wall of the gastric body related to prior G tube        site. Coagulation of tissue near the fistula using argon plasma at 1.2 liters/minute and 35        peraza was successful. To closure the gastrocutaneous fistula, four hemostatic clips were        successfully placed (MR conditional, two 16 mm DuraClip and 2 Mantis clips.       The examined duodenum was normal.    10/14, 10/15 Fever, transient hypoxia post procedure  10/15 ProCalcitonin = 8.48 suggestive of bacterial process; BCx x2 NGTD; Trach: Pseudomonas   - though benign mg stain  10;16 Leukocytosis WBC = 14.26  10/17 fever, hypotension, abd distension, no diarrhea noted this am WBC = 15.02; Rising Procalcitonin = 38.49; Obstructed J tube   10/18  lost IV access re-gained  - CT scan late this afternoon  WBC = 8.68; Rising Procalcitonin >100  10/18 imaging suggests multifocal pneumonia  10/21 improved chest exam, Procalcitonin level considerably decreased, decreased FiO2  - afebrile since 10/18  10/21 UGI endoscopy done  Findings:       The esophagus was normal. A fistula was found on the anterior wall of the gastric body.       There was evidence of a gastrostomy present in the gastric body. The patient was placed in        the supine position. The endoscope was advanced to the jejunum and the prior placed J tube        with loop attached to the wall and the loop thread was cut by endoclip. The J tube and the G        tube were removed. The gastrostomy fistula was utilized and an Avanos 22 F        Gastrostomy/Jejunal tube was advanced. The jejunal tip was advanced through the pylorus and        into the duodenum. The endoscope was then advanced to the duodenum and the loop thread at the        tip of the J tube was captured and advanced to the proximal jejunum. The loop thread was        clipped to wall by a [employer] Resolution clip. The J tube flushed easily and the G        tube decompress the abdomen easily. The internal balloon was insufflated with 10 cc of water        to hold the GJ tube in place. The outside marking was at 3.  10/23  no distress, liquid stools noted  - Meropenem discontinued  GI PCR NEG  10/24  persistent diarrhea  Cdiff NEG; enteral vanco stopped and Immodium provided  10/28 low grade temps, mild leukocytosis  10/30 blood transfusion  11/1  fever  +Cdiff  11/3 continued fever  - +BC Stph epi most likely procurement contaminant,  modest diarrhea reported  - daughter described increased diarrhea in evenings - Pseudomonas airway colonization is long term, no suggestion of pneumonia at present, sacral decub remains superifical will granulated without signs of infection  11/4  - fever, abnormal chest exam though FIO2 still 30%  rectal tube inserted for increased diarrhea  11/5 lethargy, fever, leukocytosis, increased lactate, hyponatremia  11/6 sputum gram stain suggests persistent Pseudomonas colonization  - continued fever, leukocytosis  11/7 unclear if LLL consolidation represents infection. Repeat Procalcitonin  11/7= 2.92 decreased  11/8 continued fevers  +diarrhea, lethargy continues  to complete Cefepime for LLL pneumonia v atelectasis tomorrow 11/9 11/11 increased fever with increased WBC and increased Procalcitonin off Cefepime noted, which was resumed  Concern if feed is refluxing back to the stomach versus J tube now in the stomach.     imaging show G tube in 1st part of duodenum. there are bibasilar L>R consolidations - likely continual reaspiration  11/12 persistent leukocytosis  11/13, decreased height of fever, decreased leukocytosis, Procalcitonin decreased to 52% of the 11/11 value suggesting good therapeutic response. s/p endoscopic replacement of feeding tube into jejunum  - Rectal tube came out  11/15 sleepy  - leukocytosis almost completely resolved      11/18 disturbing decompensation with clinical toxicity, unresponsive, increased leukocytosis, lactic acidosis   extensive antibiotic exposure with chronic Pseudomonas colonization of upper airway makes antibiotic resistance likely  Dental ASSESSMENT appreciated : Bedside Exam: No evidence of swelling, abscess or fistula. However, odontogenic infection cannot be fully ruled out without dental radiographs. No aspiration [of tooth] rk at this time.        RECOMMENDATIONS:  1) Apply triamcinolone acetonide paste to reduce discomfort of traumatic ulcerations.   11/19 increased leukocytosis, presistent lactic acidosis,   decreased ProCalcitonin       Issues  recurrent Cdiff  cerebral aneurysm s/p repair  chronic hypercapnic respiratory failure s/p trach (vent dependent) and chronic PEG 2022  anemia  - had iron deficiency  Pseudomonas aeruginosa upper airway colonization  (most recent isolate Resist to Cipro/Levo)  T2DM on insulin  10.14.24 -A1C: 5.8%  HTN  HLD  hypothyroidism  RA on Prednisone  Fibromyalgia  debility  sacral ulcer largely healed  malnourished/chronic catabolic state  aspiration      Suggest  Continue vanco 125 q 6 hours via J tube 11/1 --> --> 11/20   Ceftolozane, tazobactam ordered   I will follow up on culture results    discussed with [facility] team

## 2024-11-19 NOTE — PROGRESS NOTE ADULT - PROBLEM SELECTOR PLAN 10
- Patient on Sodium Chloride tabs prior to admission   - Remains on small Volumes of free h20 flushes to prevent J -Tube from clogging  -  NACL Tabs 1 gram q 8 hrs   - Monitor BMP

## 2024-11-19 NOTE — PROGRESS NOTE ADULT - SUBJECTIVE AND OBJECTIVE BOX
Patient is a 74y old  Female who presents with a chief complaint of Patient admitted with Hypoxemia and episode of Bright red blood per rectum (19 Nov 2024 12:28)    HPI:  75 yo F PMH T2DM on insulin, HTN, HLD, hypothyroidism, RA on Prednisone, Fibromyalgia, cerebral aneurysm s/p repair, chronic hypercapnic respiratory failure s/p trach (vent dependent) and chronic PEG 2022, recurrent C. diff infection (follows with Dr. Newman), persistent GJ tube leaks now s/p GC fistula repair 8/12 BIBEMS with daughter for respiratory distress, hypoxia to high 80s.  Pt also noted to have rectal bleeding this past week requiring transfusion 5 days ago in ED as per daughter.  In ED, pt afebrile, fiO2 96-98% on 40% on ventilator.  Chest Xray w/ opacification of right lung concerning for possible PNA.  Admitted to [facility] for further management.      (07 Oct 2024 18:58)    Interval Events:    REVIEW OF SYSTEMS:  [ ] Positive  [ ] All other systems negative  [ ] Unable to assess ROS because ________    Vital Signs Last 24 Hrs  T(C): 36.3 (11-19-24 @ 14:08), Max: 36.9 (11-19-24 @ 03:18)  T(F): 97.3 (11-19-24 @ 14:08), Max: 98.4 (11-19-24 @ 03:18)  HR: 82 (11-19-24 @ 14:08) (75 - 109)  BP: 159/77 (11-19-24 @ 14:08) (115/82 - 159/77)  RR: 20 (11-19-24 @ 14:08) (20 - 28)  SpO2: 100% (11-19-24 @ 14:08) (98% - 100%)    PHYSICAL EXAM:  HEENT:   [ ]Tracheostomy:  [ ]Pupils equal  [ ]No oral lesions  [ ]Abnormal    SKIN  [ ] No Rash  [ ] Abnormal  [ ] pressure    CARDIAC  [ ]Regular  [ ]Abnormal    PULMONARY  [ ]Bilateral Clear Breath Sounds  [ ]Normal Excursion  [ ]Abnormal    GI  [ ]PEG      [ ] +BS		              [ ]Soft, nondistended, nontender	  [ ]Abnormal    MUSCULOSKELETAL                                   [ ]Bedbound                 [ ]Abnormal    [ ]Ambulatory/OOB to chair                           EXTREMITIES                                         [ ]Normal  [ ]Edema                           NEUROLOGIC  [ ] Normal, non focal  [ ] Focal findings:    PSYCHIATRIC  [ ]Alert and appropriate  [ ] Sedated	 [ ]Agitated    :  Mcbride: [ ] Yes, if yes: Date of Placement:                   [  ] No    LINES: Central Lines [ ] Yes, if yes: Date of Placement                                     [  ] No    HOSPITAL MEDICATIONS:  MEDICATIONS  (STANDING):  albuterol/ipratropium for Nebulization 3 milliLiter(s) Nebulizer every 6 hours  artificial  tears Solution 1 Drop(s) Both EYES every 6 hours  ascorbic acid 500 milliGRAM(s) Oral daily  Biotene Dry Mouth Oral Rinse 5 milliLiter(s) Swish and Spit every 6 hours  calcium carbonate 1250 mG  + Vitamin D (OsCal 500 + D) 1 Tablet(s) Oral <User Schedule>  ceftolozane/tazobactam IVPB 3000 milliGRAM(s) IV Intermittent every 8 hours  chlorhexidine 0.12% Liquid 15 milliLiter(s) Oral Mucosa every 12 hours  chlorhexidine 4% Liquid 1 Application(s) Topical daily  dextrose 5% 1000 milliLiter(s) (125 mL/Hr) IV Continuous <Continuous>  dextrose 5%. 1000 milliLiter(s) (100 mL/Hr) IV Continuous <Continuous>  dextrose 5%. 1000 milliLiter(s) (50 mL/Hr) IV Continuous <Continuous>  dextrose 50% Injectable 25 Gram(s) IV Push once  dextrose 50% Injectable 12.5 Gram(s) IV Push once  diclofenac sodium 1% Gel 2 Gram(s) Topical every 6 hours  escitalopram 10 milliGRAM(s) Oral <User Schedule>  fentaNYL   Patch  12 MICROgram(s)/Hr 1 Patch Transdermal every 72 hours  fentaNYL   Patch  25 MICROgram(s)/Hr 1 Patch Transdermal every 72 hours  FIRST- Mouthwash  BLM 5 milliLiter(s) Swish and Spit every 8 hours  gabapentin Solution 250 milliGRAM(s) Oral <User Schedule>  glucagon  Injectable 1 milliGRAM(s) IntraMuscular once  hemorrhoidal Ointment 1 Application(s) Rectal at bedtime  hydrocortisone sodium succinate Injectable 50 milliGRAM(s) IV Push every 8 hours  influenza  Vaccine (HIGH DOSE) 0.5 milliLiter(s) IntraMuscular once  insulin lispro (ADMELOG) corrective regimen sliding scale   SubCutaneous every 6 hours  insulin lispro Injectable (ADMELOG) 5 Unit(s) SubCutaneous every 6 hours  lactobacillus acidophilus 1 Tablet(s) Oral <User Schedule>  levothyroxine 125 MICROGram(s) Oral <User Schedule>  lidocaine   4% Patch 4 Patch Transdermal every 24 hours  midodrine 10 milliGRAM(s) Oral every 8 hours  nystatin Powder 1 Application(s) Topical every 8 hours  pantoprazole  Injectable 40 milliGRAM(s) IV Push every 12 hours  sodium chloride 1 Gram(s) Oral every 8 hours  sodium chloride 0.65% Nasal 1 Spray(s) Both Nostrils every 6 hours  triamcinolone 0.1% Oral Paste 1 Application(s) Topical every 8 hours  vancomycin    Solution 125 milliGRAM(s) Oral every 6 hours  witch hazel Pads 1 Application(s) Topical every 12 hours    MEDICATIONS  (PRN):  acetaminophen   Oral Liquid .. 650 milliGRAM(s) Oral every 6 hours PRN Temp greater or equal to 38C (100.4F), Mild Pain (1 - 3)  aluminum hydroxide/magnesium hydroxide/simethicone Suspension 30 milliLiter(s) Enteral Tube every 4 hours PRN Dyspepsia      LABS:                        7.4    27.15 )-----------( 89       ( 19 Nov 2024 07:12 )             25.2     11-19    137  |  104  |  38[H]  ----------------------------<  193[H]  5.4[H]   |  11[L]  |  0.84    Ca    8.7      19 Nov 2024 07:12  Phos  2.8     11-19  Mg     2.0     11-19    TPro  5.6[L]  /  Alb  2.1[L]  /  TBili  1.0  /  DBili  x   /  AST  39  /  ALT  23  /  AlkPhos  113  11-19    Arterial Blood Gas:  11-18 @ 20:15  7.26/21/140/9/99.1/-16.1  ABG lactate: --  Arterial Blood Gas:  11-18 @ 15:15  7.15/28/72/10/97.1/-17.6  ABG lactate: --  Arterial Blood Gas:  11-18 @ 10:29  7.20/24/98/9/99.0/-17.1  ABG lactate: --      Mode: AC/ CMV (Assist Control/ Continuous Mandatory Ventilation)  RR (machine): 28  TV (machine): 450  FiO2: 40  PEEP: 5  ITime: 1  MAP: 17  PIP: 48     Patient is a 74y old  Female who presents with a chief complaint of Patient admitted with Hypoxemia and episode of Bright red blood per rectum (19 Nov 2024 12:28)    HPI:  73 yo F PMH T2DM on insulin, HTN, HLD, hypothyroidism, RA on Prednisone, Fibromyalgia, cerebral aneurysm s/p repair, chronic hypercapnic respiratory failure s/p trach (vent dependent) and chronic PEG 2022, recurrent C. diff infection (follows with Dr. Newman), persistent GJ tube leaks now s/p GC fistula repair 8/12 BIBEMS with daughter for respiratory distress, hypoxia to high 80s.  Pt also noted to have rectal bleeding this past week requiring transfusion 5 days ago in ED as per daughter.  In ED, pt afebrile, fiO2 96-98% on 40% on ventilator.  Chest Xray w/ opacification of right lung concerning for possible PNA.  Admitted to [facility]CU for further management.      (07 Oct 2024 18:58)    Interval Events: Critically ill and guarded    REVIEW OF SYSTEMS:  [ ] Positive  [ ] All other systems negative  [x ] Unable to assess ROS because patient is obtunded    Vital Signs Last 24 Hrs  T(C): 36.3 (11-19-24 @ 14:08), Max: 36.9 (11-19-24 @ 03:18)  T(F): 97.3 (11-19-24 @ 14:08), Max: 98.4 (11-19-24 @ 03:18)  HR: 82 (11-19-24 @ 14:08) (75 - 109)  BP: 159/77 (11-19-24 @ 14:08) (115/82 - 159/77)  RR: 20 (11-19-24 @ 14:08) (20 - 28)  SpO2: 100% (11-19-24 @ 14:08) (98% - 100%)    PHYSICAL EXAM:  HEENT:   [X] Tracheostomy: #8 distal XLT cuffed Shiley   [X] PERRLA  [ ] No oral lesions  [ ] Abnormal    SKIN  [ ] No Rash  [ ] Abnormal  [X] pressure - sacral unstageable    CARDIAC  [X] Regular  [ ] Abnormal    PULMONARY  [ ] Bilateral Clear Breath Sounds  [ ] Normal Excursion  [X] Abnormal - Diffuses coarse breath sounds / decreased at bases bilaterally     GI  [X] PEG site covered w/ dry dressing ( no active driange)                [X] Soft, nondistended, nontender	  [X] Abnormal - old PEG site with small opening c/d/i,     MUSCULOSKELETAL                                   [X] Bedbound                 [ ] Abnormal    [ ] Ambulatory/OOB to chair                           EXTREMITIES                                         [ ] Normal  [X] Edema - mod generalized pitting edema                          NEUROLOGIC  [ ] Normal, non focal  [X] Focal findings: Obtunded Minimally unresponsive to voice, grimacing occasionally, not following commands, no purposeful movements in all extremities, minimally withdrawals to noxious stimuli.    PSYCHIATRIC  [x] obtunded  [ ] Sedated	 [ ]Agitated    :  Mcbride: [ ] Yes, if yes: Date of Placement:                   [X] No    LINES: Central Lines [ ] Yes, if yes: Date of Placement                                     [X] No    HOSPITAL MEDICATIONS:  MEDICATIONS  (STANDING):  albuterol/ipratropium for Nebulization 3 milliLiter(s) Nebulizer every 6 hours  artificial  tears Solution 1 Drop(s) Both EYES every 6 hours  ascorbic acid 500 milliGRAM(s) Oral daily  Biotene Dry Mouth Oral Rinse 5 milliLiter(s) Swish and Spit every 6 hours  calcium carbonate 1250 mG  + Vitamin D (OsCal 500 + D) 1 Tablet(s) Oral <User Schedule>  ceftolozane/tazobactam IVPB 3000 milliGRAM(s) IV Intermittent every 8 hours  chlorhexidine 0.12% Liquid 15 milliLiter(s) Oral Mucosa every 12 hours  chlorhexidine 4% Liquid 1 Application(s) Topical daily  dextrose 5% 1000 milliLiter(s) (125 mL/Hr) IV Continuous <Continuous>  dextrose 5%. 1000 milliLiter(s) (100 mL/Hr) IV Continuous <Continuous>  dextrose 5%. 1000 milliLiter(s) (50 mL/Hr) IV Continuous <Continuous>  dextrose 50% Injectable 25 Gram(s) IV Push once  dextrose 50% Injectable 12.5 Gram(s) IV Push once  diclofenac sodium 1% Gel 2 Gram(s) Topical every 6 hours  escitalopram 10 milliGRAM(s) Oral <User Schedule>  fentaNYL   Patch  12 MICROgram(s)/Hr 1 Patch Transdermal every 72 hours  fentaNYL   Patch  25 MICROgram(s)/Hr 1 Patch Transdermal every 72 hours  FIRST- Mouthwash  BLM 5 milliLiter(s) Swish and Spit every 8 hours  gabapentin Solution 250 milliGRAM(s) Oral <User Schedule>  glucagon  Injectable 1 milliGRAM(s) IntraMuscular once  hemorrhoidal Ointment 1 Application(s) Rectal at bedtime  hydrocortisone sodium succinate Injectable 50 milliGRAM(s) IV Push every 8 hours  influenza  Vaccine (HIGH DOSE) 0.5 milliLiter(s) IntraMuscular once  insulin lispro (ADMELOG) corrective regimen sliding scale   SubCutaneous every 6 hours  insulin lispro Injectable (ADMELOG) 5 Unit(s) SubCutaneous every 6 hours  lactobacillus acidophilus 1 Tablet(s) Oral <User Schedule>  levothyroxine 125 MICROGram(s) Oral <User Schedule>  lidocaine   4% Patch 4 Patch Transdermal every 24 hours  midodrine 10 milliGRAM(s) Oral every 8 hours  nystatin Powder 1 Application(s) Topical every 8 hours  pantoprazole  Injectable 40 milliGRAM(s) IV Push every 12 hours  sodium chloride 1 Gram(s) Oral every 8 hours  sodium chloride 0.65% Nasal 1 Spray(s) Both Nostrils every 6 hours  triamcinolone 0.1% Oral Paste 1 Application(s) Topical every 8 hours  vancomycin    Solution 125 milliGRAM(s) Oral every 6 hours  witch hazel Pads 1 Application(s) Topical every 12 hours    MEDICATIONS  (PRN):  acetaminophen   Oral Liquid .. 650 milliGRAM(s) Oral every 6 hours PRN Temp greater or equal to 38C (100.4F), Mild Pain (1 - 3)  aluminum hydroxide/magnesium hydroxide/simethicone Suspension 30 milliLiter(s) Enteral Tube every 4 hours PRN Dyspepsia      LABS:                        7.4    27.15 )-----------( 89       ( 19 Nov 2024 07:12 )             25.2     11-19    137  |  104  |  38[H]  ----------------------------<  193[H]  5.4[H]   |  11[L]  |  0.84    Ca    8.7      19 Nov 2024 07:12  Phos  2.8     11-19  Mg     2.0     11-19    TPro  5.6[L]  /  Alb  2.1[L]  /  TBili  1.0  /  DBili  x   /  AST  39  /  ALT  23  /  AlkPhos  113  11-19    Arterial Blood Gas:  11-18 @ 20:15  7.26/21/140/9/99.1/-16.1  ABG lactate: --  Arterial Blood Gas:  11-18 @ 15:15  7.15/28/72/10/97.1/-17.6  ABG lactate: --  Arterial Blood Gas:  11-18 @ 10:29  7.20/24/98/9/99.0/-17.1  ABG lactate: --      Mode: AC/ CMV (Assist Control/ Continuous Mandatory Ventilation)  RR (machine): 28  TV (machine): 450  FiO2: 40  PEEP: 5  ITime: 1  MAP: 17  PIP: 48

## 2024-11-19 NOTE — PROGRESS NOTE ADULT - SUBJECTIVE AND OBJECTIVE BOX
SUBJECTIVE AND OBJECTIVE: pt seen and examined at bedside. pt lethargic on exam. unable to participate   Indication for Geriatrics and Palliative Care Services/INTERVAL HPI: GOC     OVERNIGHT EVENTS: pt with hypotension     DNR on chart: Intubate  Intubate      Allergies    walnut (Unknown)  metronidazole (Rash)  Lyrica (Unknown)  penicillin (Unknown)  Pineapple (Unknown)  Tagamet (Unknown)  heparin (Unknown)  Pecans (Unknown)  Hazelnut (Unknown)  meropenem (Rash)    Intolerances    MEDICATIONS  (STANDING):  albuterol/ipratropium for Nebulization 3 milliLiter(s) Nebulizer every 6 hours  artificial  tears Solution 1 Drop(s) Both EYES every 6 hours  ascorbic acid 500 milliGRAM(s) Oral daily  Biotene Dry Mouth Oral Rinse 5 milliLiter(s) Swish and Spit every 6 hours  calcium carbonate 1250 mG  + Vitamin D (OsCal 500 + D) 1 Tablet(s) Oral <User Schedule>  ceftolozane/tazobactam IVPB 3000 milliGRAM(s) IV Intermittent every 8 hours  chlorhexidine 0.12% Liquid 15 milliLiter(s) Oral Mucosa every 12 hours  chlorhexidine 4% Liquid 1 Application(s) Topical daily  dextrose 5% 1000 milliLiter(s) (125 mL/Hr) IV Continuous <Continuous>  dextrose 5%. 1000 milliLiter(s) (100 mL/Hr) IV Continuous <Continuous>  dextrose 5%. 1000 milliLiter(s) (50 mL/Hr) IV Continuous <Continuous>  dextrose 50% Injectable 25 Gram(s) IV Push once  dextrose 50% Injectable 12.5 Gram(s) IV Push once  diclofenac sodium 1% Gel 2 Gram(s) Topical every 6 hours  escitalopram 10 milliGRAM(s) Oral <User Schedule>  fentaNYL   Patch  12 MICROgram(s)/Hr 1 Patch Transdermal every 72 hours  fentaNYL   Patch  25 MICROgram(s)/Hr 1 Patch Transdermal every 72 hours  FIRST- Mouthwash  BLM 5 milliLiter(s) Swish and Spit every 8 hours  gabapentin Solution 250 milliGRAM(s) Oral <User Schedule>  glucagon  Injectable 1 milliGRAM(s) IntraMuscular once  hemorrhoidal Ointment 1 Application(s) Rectal at bedtime  hydrocortisone sodium succinate Injectable 50 milliGRAM(s) IV Push every 8 hours  influenza  Vaccine (HIGH DOSE) 0.5 milliLiter(s) IntraMuscular once  insulin glargine Injectable (LANTUS) 5 Unit(s) SubCutaneous <User Schedule>  insulin lispro (ADMELOG) corrective regimen sliding scale   SubCutaneous every 6 hours  lactobacillus acidophilus 1 Tablet(s) Oral <User Schedule>  levothyroxine 125 MICROGram(s) Oral <User Schedule>  lidocaine   4% Patch 4 Patch Transdermal every 24 hours  midodrine 10 milliGRAM(s) Oral every 8 hours  nystatin Powder 1 Application(s) Topical every 8 hours  pantoprazole  Injectable 40 milliGRAM(s) IV Push every 12 hours  sodium chloride 1 Gram(s) Oral every 8 hours  sodium chloride 0.65% Nasal 1 Spray(s) Both Nostrils every 6 hours  triamcinolone 0.1% Oral Paste 1 Application(s) Topical every 8 hours  vancomycin    Solution 125 milliGRAM(s) Oral every 6 hours  witch hazel Pads 1 Application(s) Topical every 12 hours    MEDICATIONS  (PRN):  acetaminophen   Oral Liquid .. 650 milliGRAM(s) Oral every 6 hours PRN Temp greater or equal to 38C (100.4F), Mild Pain (1 - 3)  aluminum hydroxide/magnesium hydroxide/simethicone Suspension 30 milliLiter(s) Enteral Tube every 4 hours PRN Dyspepsia      ITEMS UNCHECKED ARE NOT PRESENT    PRESENT SYMPTOMS: [ x]Unable to self-report - see [ ] CPOT [x ] PAINADS [x ] RDOS  Source if other than patient:  [ ]Family   [x ]Team     Pain:  [ ]yes [ ]no  QOL impact -   Location -                    Aggravating factors -  Quality -  Radiation -  Timing-  Severity (0-10 scale):  Minimal acceptable level (0-10 scale):     CPOT:    https://www.sccm.org/getattachment/xqn74j46-3w4x-[email or web address]-0805f9070a5m/Critical-Care-Pain-Observation-Tool-(CPOT)    PAINAD Score: See PAINAD tool and score below       Dyspnea:                           [ ]Mild [ ]Moderate [ ]Severe     RDOS: See RDOS tool and score below   0 to 2  minimal or no respiratory distress   3  mild distress  4 to 6 moderate distress  >7 severe distress      Anxiety:                             [ ]Mild [ ]Moderate [ ]Severe  Fatigue:                             [ ]Mild [ ]Moderate [ ]Severe  Nausea:                             [ ]Mild [ ]Moderate [ ]Severe  Loss of appetite:              [ ]Mild [ ]Moderate [ ]Severe  Constipation:                    [ ]Mild [ ]Moderate [ ]Severe    PCSSQ[Palliative Care Spiritual Screening Question]   Severity (0-10):  Score of 4 or > indicate consideration of Chaplaincy referral.  Chaplaincy Referral: [ ] yes [ ] refused [ ] following [ ] Deferred     Caregiver [place]? : [ ] yes [ ] no [ ] Deferred [ ] Declined             Social work referral [ ] Patient & Family Centered Care Referral [ ]     Anticipatory Grief present?:  [ ] yes [ ] no  [ ] Deferred                  Social work referral [ ] Chaplaincy Referral [ ]    		  Other Symptoms:  [x ]All other review of systems negative- pt unable to participate     Palliative Performance Status Version 2:   See PPSv2 tool and score below         PHYSICAL EXAM:  Vital Signs Last 24 Hrs  T(C): 36.3 (19 Nov 2024 08:55), Max: 36.9 (19 Nov 2024 03:18)  T(F): 97.4 (19 Nov 2024 08:55), Max: 98.4 (19 Nov 2024 03:18)  HR: 79 (19 Nov 2024 08:55) (78 - 109)  BP: 138/61 (19 Nov 2024 08:55) (96/52 - 138/61)  BP(mean): --  RR: 20 (19 Nov 2024 08:55) (18 - 28)  SpO2: 99% (19 Nov 2024 08:55) (96% - 100%)    Parameters below as of 19 Nov 2024 08:55  Patient On (Oxygen Delivery Method): ventilator     I&O's Summary    18 Nov 2024 07:01  -  19 Nov 2024 07:00  --------------------------------------------------------  IN: 0 mL / OUT: 300 mL / NET: -300 mL       GENERAL: [ ]Cachexia    [ ]Alert  [ ]Oriented x   [x ]Lethargic  [ ]Unarousable  [ ]Verbal  [ ]Non-Verbal  Behavioral:   [ ]Anxiety  [ ]Delirium [ ]Agitation [ ]Other  HEENT:  [ ]Normal   [ ]Dry mouth   [ x]ET Tube/Trach  [ ]Oral lesions  PULMONARY:   [ ]Clear [ ]Tachypnea  [ ]Audible excessive secretions   [ ]Rhonchi        [ ]Right [ ]Left [ ]Bilateral  [ ]Crackles        [ ]Right [ ]Left [ ]Bilateral  [ ]Wheezing     [ ]Right [ ]Left [ ]Bilateral  [ x]Diminished BS [ ] Right [ ]Left [x ]Bilateral  CARDIOVASCULAR:    [ ]Regular [ ]Irregular [ ]Tachy  [ ]Red [ ]Murmur [ ]Other  GASTROINTESTINAL:  [x ]Soft  [ ]Distended   [ x]+BS  [ x]Non tender [ ]Tender  [ ]Other [x ]PEG [ ]OGT/ NGT   Last BM:   GENITOURINARY:  [ ]Normal [x ]Incontinent   [ ]Oliguria/Anuria   [ ]Mcbride  MUSCULOSKELETAL:   [ ]Normal   [x]Weakness  [x ]Bed/Wheelchair bound [ ]Edema  NEUROLOGIC:   [ ]No focal deficits  [ x] Cognitive impairment  [ ] Dysphagia [ ]Dysarthria [ ] Paresis [ ]Other   SKIN:   [ ]Normal  [ ]Rash  [ ]Other  [ x]Pressure ulcer(s) [ x]y [ ]n present on admission    CRITICAL CARE:  [ ]Shock Present  [ ]Septic [ ]Cardiogenic [ ]Neurologic [ ]Hypovolemic  [ ]Vasopressors [ ]Inotropes  [ ]Respiratory failure present [ ]Mechanical Ventilation [ ]Non-invasive ventilatory support [ ]High-Flow Mode: AC/ CMV (Assist Control/ Continuous Mandatory Ventilation), RR (machine): 28, TV (machine): 450, FiO2: 40, PEEP: 5, ITime: 1, MAP: 17, PIP: 47  [ ]Acute  [ ]Chronic [ ]Hypoxic  [ ]Hypercarbic [ ]Other  [ ]Other organ failure     LABS:                        7.4    27.15 )-----------( 89       ( 19 Nov 2024 07:12 )             25.2   11-19    137  |  104  |  38[H]  ----------------------------<  193[H]  5.4[H]   |  11[L]  |  0.84    Ca    8.7      19 Nov 2024 07:12  Phos  2.8     11-19  Mg     2.0     11-19    TPro  5.6[L]  /  Alb  2.1[L]  /  TBili  1.0  /  DBili  x   /  AST  39  /  ALT  23  /  AlkPhos  113  11-19      Urinalysis Basic - ( 19 Nov 2024 07:12 )    Color: x / Appearance: x / SG: x / pH: x  Gluc: 193 mg/dL / Ketone: x  / Bili: x / Urobili: x   Blood: x / Protein: x / Nitrite: x   Leuk Esterase: x / RBC: x / WBC x   Sq Epi: x / Non Sq Epi: x / Bacteria: x      RADIOLOGY & ADDITIONAL STUDIES:  < from: Xray Abdomen 1 View PORTABLE -Urgent (Xray Abdomen 1 View PORTABLE -Urgent .) (11.14.24 @ 01:23) >    ACC: [id number] EXAM:  XR ABDOMEN PORTABLE URGENT 1V   ORDERED BY: ALLY COBOS     PROCEDURE DATE:  11/14/2024          INTERPRETATION:  CLINICAL INDICATION: GJ Tube Placement    TECHNIQUE: Frontal radiograph of the abdomen.    COMPARISON: CT abdomen and pelvis 11/11/2024, x-ray chest and abdomen   11/9/2024    INTERPRETATION:    Midline abdominal coil material. Surgical clips overlie the left upper   quadrant.    Gastrojejunostomy tubing overlies the abdomen. Oral contrast within loops   of small bowel in the left hemiabdomen. No obstruction or extravasation.    IMPRESSION:    Oral contrast and loops of small bowel within the left hemiabdomen. No   obstruction or extravasation.    --- End of Report ---           FAUSTINO ZHANG MD; ResidentRadiologist  This document has been electronically signed.  MARK EDWARDS MD; Attending Radiologist  This document has been electronically signed. Nov 14 2024  9:43AM    < end of copied text >    Protein Calorie Malnutrition Present: [ ]mild [ ]moderate [ ]severe [ ]underweight [ ]morbid obesity  https://www.andeal.org/vault/2440/web/files/ONC/Table_Clinical%20Characteristics%20to%20Document%20Malnutrition-White%20JV%20et%20al%435693.pdf    Height (cm): 149.9 (11-13-24 @ 13:27), 152.4 (06-20-24 @ 11:41), 152.4 (06-03-24 @ 18:13)  Weight (kg): 54.4 (11-13-24 @ 13:27), 56 (10-02-24 @ 21:56), 54.3 (08-11-24 @ 15:58)  BMI (kg/m2): 24.2 (11-13-24 @ 13:27), 24.1 (10-02-24 @ 21:56), 23.4 (08-11-24 @ 15:58)    [x ]PPSV2 < or = 30%  [ ]significant weight loss [ ]poor nutritional intake [ ]anasarca[ ]Artificial Nutrition    Other REFERRALS:  [ ]Hospice  [ ]Child Life  [ ]Social Work  [ ]Case management [ ]Holistic Therapy     Goals of Care Document:

## 2024-11-19 NOTE — PROGRESS NOTE ADULT - PROBLEM SELECTOR PLAN 1
Inpatient Plan:  - Please monitor blood glucose values q 6 hours while on tube feeding or NPO  - s/p total 8 units of Lantus today ( Received 5 units at 6 am and 3 units around 11 am. will Increase Lantus to 10 units @0600  - Start standing Admelog 5 units q 6 hours while on 24 continuous tube feeding, and D5w infusion ( hold if holding tube feeding )   - Continue with moderate correction scale q 6 hours while on 24 hour tube feeding   - Please keep hypoglycemia protocol in place   - Please inform endocrine team for any changes in tube feeding, D5w infusion and steroid    Discharge Plan:  - TBD depending on insulin requirement and discharge tube feeding regimen (Bolus vs continuous tube feeding)   - If bolus tube feeding > Lantus plus Humalog   - Patient should have BGs checked 4x a day while on TFs.    Daughter aware to contact Endocrinologist if BG <70mg/dL x1 or >200mg/dL consistently or >400mg/dL x1.   - Followup with Dr Ruth: [facility]: [address] [place] [address]. [address]. [place] [address]. Tel: [phone]- Telemedicine- daughter to schedule.   - Make sure pt has Rx for all DM supplies and insulin

## 2024-11-19 NOTE — PROGRESS NOTE ADULT - SUBJECTIVE AND OBJECTIVE BOX
Follow Up:  sepsis    Interval History/ROS: sleeping    Allergies  walnut (Unknown)  metronidazole (Rash)  Lyrica (Unknown)  penicillin (Unknown)  Pineapple (Unknown)  Tagamet (Unknown)  heparin (Unknown)  Pecans (Unknown)  Hazelnut (Unknown)  meropenem (Rash)        ANTIMICROBIALS:  ceftolozane/tazobactam IVPB 3000 every 8 hours  vancomycin    Solution 125 every 6 hours      OTHER MEDS:  MEDICATIONS  (STANDING):  acetaminophen   Oral Liquid .. 650 every 6 hours PRN  albuterol/ipratropium for Nebulization 3 every 6 hours  aluminum hydroxide/magnesium hydroxide/simethicone Suspension 30 every 4 hours PRN  dextrose 50% Injectable 25 once  dextrose 50% Injectable 12.5 once  escitalopram 10 <User Schedule>  fentaNYL   Patch  12 MICROgram(s)/Hr 1 every 72 hours  fentaNYL   Patch  25 MICROgram(s)/Hr 1 every 72 hours  gabapentin Solution 250 <User Schedule>  glucagon  Injectable 1 once  hydrocortisone sodium succinate Injectable 50 every 8 hours  influenza  Vaccine (HIGH DOSE) 0.5 once  insulin lispro (ADMELOG) corrective regimen sliding scale  every 6 hours  levothyroxine 125 <User Schedule>  midodrine 10 every 8 hours  pantoprazole  Injectable 40 every 12 hours      Vital Signs Last 24 Hrs  T(C): 36.3 (19 Nov 2024 08:55), Max: 36.9 (19 Nov 2024 03:18)  T(F): 97.4 (19 Nov 2024 08:55), Max: 98.4 (19 Nov 2024 03:18)  HR: 80 (19 Nov 2024 11:46) (75 - 109)  BP: 138/61 (19 Nov 2024 08:55) (115/82 - 138/61)  BP(mean): --  RR: 20 (19 Nov 2024 08:55) (20 - 28)  SpO2: 100% (19 Nov 2024 11:46) (98% - 100%)    Parameters below as of 19 Nov 2024 11:46  Patient On (Oxygen Delivery Method): ventilator        PHYSICAL EXAM:  General: chronically ill appearing NAD, Non-toxic  Neurology: Asleep  Respiratory: scattered rhonchi  CV: RRR, S1S2, no murmurs, rubs or gallops  Abdominal: Soft, Non-tender,  Extremities: generalized edema  Line Sites: Clear  Skin: No rash                          7.4    27.15 )-----------( 89       ( 19 Nov 2024 07:12 )             25.2   WBC Count: 27.15 (11-19 @ 07:12)  WBC Count: 22.92 (11-18 @ 07:12)  WBC Count: 15.46 (11-17 @ 06:30)  WBC Count: 10.66 (11-15 @ 06:37)        11-19    137  |  104  |  38[H]  ----------------------------<  193[H]  5.4[H]   |  11[L]  |  0.84    Ca    8.7      19 Nov 2024 07:12  Phos  2.8     11-19  Mg     2.0     11-19    TPro  5.6[L]  /  Alb  2.1[L]  /  TBili  1.0  /  DBili  x   /  AST  39  /  ALT  23  /  AlkPhos  113  11-19        MICROBIOLOGY:  .Sputum Sputum  11-18-24 --  --    Moderate polymorphonuclear leukocytes per low power field  No Squamous epithelial cells per low power field  Numerous Gram Negative Rods per oil power field  Numerous Gram Positive Rods per oil power field      Trach Asp Tracheal Aspirate  11-10-24   Few Pseudomonas aeruginosa (Carbapenem Resistant)  Commensal vinay consistent with body site  --  Pseudomonas aeruginosa (Carbapenem Resistant)      .Blood BLOOD  11-10-24   No growth at 5 days  --  --      .Sputum Sputum  11-04-24   Moderate Mixed gram negative rods including  Moderate Pseudomonas aeruginosa  Commensal vinay consistent with body site  --  Pseudomonas aeruginosa      Clean Catch Clean Catch (Midstream)  11-03-24   No growth  --  --      .Blood BLOOD  11-03-24   No growth at 5 days  --  --      .Blood BLOOD  11-03-24   No growth at 5 days  --  --      .Blood BLOOD  11-01-24   Growth in anaerobic bottle: Staphylococcus epidermidis  Isolation of Coagulase negative Staphylococcus from single blood culture  sets may represent  contamination. Contact the Microbiology Department at [phone] if  susceptibility testing is needed.  clinically indicated.  Direct identification is available within approximately 3-5  hours either by Blood Panel Multiplexed PCR or Direct  MALDI-TOF. Details: [email or web address]  --  Blood Culture PCR      Catheterized Catheterized  11-01-24   No growth  --  --      .Blood BLOOD  11-01-24   No growth at 5 days  --  --      Trach Asp Tracheal Aspirate  10-27-24   Moderate Pseudomonas aeruginosa  Moderate Pseudomonas aeruginosa #2  Multiple Morphological Strains  Commensal vinay consistent with body site  --  Pseudomonas aeruginosa  Pseudomonas aeruginosa      .Blood BLOOD  10-27-24   No growth at 5 days  --  --        .Sputum Sputum    v          RADIOLOGY:    Zion Newman MD; Division of Infectious Disease; Pager: [phone]; nights and weekends: [phone]

## 2024-11-19 NOTE — PROGRESS NOTE ADULT - PROBLEM SELECTOR PLAN 5
will continue to follow for goc  case discussed with RCU  Can be reached by TEAMS M-F 9-5 Loni Amador Any other time please page [phone] if needed

## 2024-11-19 NOTE — PROGRESS NOTE ADULT - PROBLEM SELECTOR PLAN 9
- Patient has known hx of prior G-Tube stoma leak   - S/p EGD for closure of Gastric Fistula (8/12) w/ Total of 3 Mantis clips and two 22 mm Microtech clips by GI Dr Rosales   - Old stoma peg site with mild clear drainage s/p repair of fistula  - 10/14 G-J exchanged by GI and clips applied to fistula site  - 10/17 PEGJ clogged  - 10/21 PEGJ revision done, tolerating enteral feeds  - Tube study requested to evaluate for J-tube migration 11/9 -- showed tip in duodenum.  - CT CAP on 11/11 shows feeding tube coils in the stomach with tip within the first portion of the duodenum  - G- J Tube repositioned on 11/13 via upper endoscopy   - Both feeds and medications to be given through feeding/J-port  - G-port is placed to continuous bag drainage for decompression  - Continue Maalox TID for gaseous distention  - GI  is following

## 2024-11-19 NOTE — PROGRESS NOTE ADULT - ASSESSMENT
73 yo F w/h/o controlled T2DM (A1C 5.8%) on insulin at home. Also HTN, HLD, hypothyroidism, RA on Prednisone, Fibromyalgia, cerebral aneurysm s/p repair, chronic hypercapnic respiratory failure s/p trach (vent dependent) and chronic PEG 2022, recurrent C. diff infection (follows with Dr. 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 S/p antibiotics and s/p GJ tube exchanges on 10/14, s/p PEG replacement 10/21.     Endocrine consulted for Type 2 DM management while on tube feeding.      Tube feeding changed to 40 ml/hr for 24 hours ( Katefarm peptide 1.5 ) this am. Noted sodium bicarb gtt in d5w at 125ml/hr started and on hydrocortisone 50 mg q 8 hours. Last 24 hour BGs 203-323, above goal. pt received reduced lantus 5 units this am, so extra 3 units of Lantus X1 given. Will adjust insulin doses for BG Goal 100-200 mg/dl            Home DM medications: Lantus 35 units at HS, Humalog 26 units with # 1 feeding, 22 units with #2 tube feeding, 20 units with #3 tube feeding and  Humalog correction scale (  2 units for -726, 4 units for -250, 6 units for -300, 8units for -350, 10 units for -400, 12 units for -450)   while on KateFarm formula 3 cans/day, slow bolus( run at 80 ml/hr total volume 960 ml/day> 1st can around 6:30-8:30, 2nd can around 3 pm, 3rd can around 8-9 pm) plus Banatrol 1/2 packet BID, was increasing to 1 packet BID, plus Kefir 10 oz/day ( divided in multiple times throughout the day).                           75 yo F w/h/o controlled T2DM (A1C 5.8%) on insulin at home. Also HTN, HLD, hypothyroidism, RA on Prednisone, Fibromyalgia, cerebral aneurysm s/p repair, chronic hypercapnic respiratory failure s/p trach (vent dependent) and chronic PEG 2022, recurrent C. diff infection (follows with Dr. 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 S/p antibiotics and s/p GJ tube exchanges on 10/14, s/p PEG replacement 10/21. Endocrine consulted for Type 2 DM management while on tube feeding.      Tube feeding changed to 40 ml/hr for 24 hours ( Katefarm peptide 1.5 ) this am. Noted sodium bicarb gtt in d5w at 125ml/hr started and on hydrocortisone 50 mg q 8 hours. Last 24 hour BGs 203-323, above goal. pt received reduced lantus 5 units this am, so extra 3 units of Lantus X1 given. Will adjust insulin doses for BG Goal 100-200 mg/dl      Home DM medications: Lantus 35 units at HS, Humalog 26 units with # 1 feeding, 22 units with #2 tube feeding, 20 units with #3 tube feeding and  Humalog correction scale (  2 units for -087, 4 units for -250, 6 units for -300, 8units for -350, 10 units for -400, 12 units for -450)   while on KateFarm formula 3 cans/day, slow bolus( run at 80 ml/hr total volume 960 ml/day> 1st can around 6:30-8:30, 2nd can around 3 pm, 3rd can around 8-9 pm) plus Banatrol 1/2 packet BID, was increasing to 1 packet BID, plus Kefir 10 oz/day ( divided in multiple times throughout the day).    Addendum at 18: 30  Noted severe hyperglycemia  at 6pm. s/p total 17 units of Admelog.  - Hold tube feeding until BG < 300. resume tube feeding once BG < 300>> spoke with RN   - Check BG in 2 hours > spoke with RN  - Increase standing Admelog to 10 units q 6 hours while running tube feeding  ( Hold if off tube feeding )   - Increase Lantus to 12 units at 6 am tomorrow   - Spoke with HANNAH MALDONADOC

## 2024-11-19 NOTE — PROGRESS NOTE ADULT - PROBLEM SELECTOR PLAN 5
LDL goal <70 due to DM  Pt LDL NA  Order fasting lipid if not recently done  Statin as per primary team. Not getting statin at this time   Manage per primary team   F/u levels as out pt      Contact via Microsoft Teams during business hours  To reach covering provider access AMION via sunrise tools  For Urgent matters/after-hours/weekends/holidays please page endocrine fellow on call   For nonurgent matters please email [email or web address]    Please note that this patient may be followed by different provider tomorrow.  Notify endocrine 24 hours prior to discharge for final recommendations

## 2024-11-19 NOTE — PROGRESS NOTE ADULT - PROBLEM SELECTOR PLAN 5
- Patient on Lantus and Humalog at home    - Christina d/cd 11/11  - Lantus decreased 10 --> 5 by gilles (11/18)  - Now on D5 w/ Sodium Bicarb  - c/w ISS  - Endocrine following

## 2024-11-19 NOTE — PROGRESS NOTE ADULT - PROBLEM SELECTOR PLAN 2
Patient here for 2-week postop follow-up, sx 06/09/21 for: LUMBAR 3 AND LUMBAR 4 LAMINECTOMIES. Patient states has good days and bad days. Gets some tenderness in legs. Notes had a fall recently.  Was walking, then legs wouldn't move, felt like were in q Wound Consult requested to assist w/ management of  Sacral/bilateral Buttocks chronic stage 4 pressure injury now resolved with some denuded areas of moisture  Left ischium chronic stage 4 pressure injury now resolved  Incontinence Dermatitis  BL hallux and Lt 5th toe DTPI  -> defer to wound care.

## 2024-11-19 NOTE — PROGRESS NOTE ADULT - PROBLEM SELECTOR PLAN 12
- Patients daughter and  updated at bedside by RCU Team today  - Patient seen by Palliative care today remains DNR But continuing other medical management at this time  - [facility] rediscussed this evening with Daughter given worsening Acidosis; d/w her high likelihood of cardiac arrest and possible need for pressors if becomes hemodynamically unstable. Her Daughter would prefer at the moment that she remain in RCU But also would liked to be called if change in clinical status to determine what medical modalities she would want performed at that time if indicated. D/w her updating MOLST Regarding pressors she will discuss with her father and has not made a decision yet at this time.

## 2024-11-20 NOTE — PROGRESS NOTE ADULT - NS ATTEND AMEND GEN_ALL_CORE FT
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 Found to have Pseudomonas aeruginosa in sputum culture and urine culture with ESBL E. coli s/p course of meropenem. Course complicated by antibiotic-associated diarrhea.             - remains minimally interactive, minimize any sedatives   - continue ventilation at increased settings given her  acidosis   - cont Duonebs + 3% q6h for ACT  - cont PO Vanc for Cdiff  - given worsening septic picture remains on Zerbaxa and sputum cx now w/ more resistant PsA, f/u ID reccs    - dental eval performed to eval oral source for infection and felt to not be the source of sepsis  - cont monitoring blood gas and clinically  - htn regimen d/c and now may d/c midodrine if bp remains improved  - monitor i/s and but will d/c bicarb gtt given ph   - Anemia multifactorial, hemorrhoids, AOCD. s/p EGD without active bleeding, transfuse for Hb < 7  - cont to monitor FS, now on stress dose steroids  (for RA,on home prednisone 5 mg daily )  - cont wound care to sacrum for decub    - DVT ppx- only scd for now given significant anemia a few days ago    Prognosis and GOC d/w daughter at bedside

## 2024-11-20 NOTE — PROGRESS NOTE ADULT - PROBLEM SELECTOR PLAN 1
Inpatient Plan:  - Please monitor blood glucose values q 6 hours while on tube feeding or NPO  - C/w Lantus 12u QAM  - C/w Admelog 10u q 6 hours while on 24 continuous TFs and D5w infusion ( HOLD if tube feeds held )   - C/w moderate dose Admelog correction scale q 6 hours while on 24 hour tube feeding   - Please keep hypoglycemia protocol in place   - Please inform Endocrine team for any changes in tube feeding, D5 bicarb infusion and steroid  - Please keep all IV abx in NS solution if possible!    Discharge Plan:  - TBD depending on insulin requirement and discharge tube feeding regimen (Bolus vs continuous tube feeding)   - If bolus tube feeding > Lantus plus Humalog   - Patient should have BGs checked 4x a day while on TFs.    Daughter aware to contact Endocrinologist if BG <70mg/dL x1 or >200mg/dL consistently or >400mg/dL x1.   - Followup with Dr Ruth: [facility]in[facility]: [address] [place] [address]. [address]. [place] [address]. Tel: [phone]- Telemedicine- daughter to schedule.   - Make sure pt has Rx for all DM supplies and insulin Inpatient Plan:  - Please monitor blood glucose values q 6 hours while on tube feeding or NPO  - C/w Lantus 12u QAM  - Adjust Admelog to 13u q 6 hours while on 24 continuous TFs and D5w infusion ( HOLD if tube feeds held )   - C/w moderate dose Admelog correction scale q 6 hours while on 24 hour tube feeding   - Please keep hypoglycemia protocol in place   - Please inform Endocrine team for any changes in tube feeding, D5 bicarb infusion and steroid  - Please keep all IV abx in NS solution if possible!    Discharge Plan:  - TBD depending on insulin requirement and discharge tube feeding regimen (Bolus vs continuous tube feeding)   - If bolus tube feeding > Lantus plus Humalog   - Patient should have BGs checked 4x a day while on TFs.    Daughter aware to contact Endocrinologist if BG <70mg/dL x1 or >200mg/dL consistently or >400mg/dL x1.   - Followup with Dr Ruth: [facility]: [address] [place] [address]. [address]. [place] [address]. Tel: [phone]- Telemedicine- daughter to schedule.   - Make sure pt has Rx for all DM supplies and insulin Inpatient Plan:  - Please monitor blood glucose values q 6 hours while on tube feeding or NPO  - Adjust Lantus to 10u QAM  - Adjust Admelog to 5u q 6 hours while on 24 continuous TFs and D5w infusion ( HOLD if tube feeds held )   - C/w moderate dose Admelog correction scale q 6 hours while on 24 hour tube feeding   - Please keep hypoglycemia protocol in place   - Please inform Endocrine team for any changes in tube feeding, D5 bicarb infusion and steroid  - Please keep all IV abx in NS solution if possible!    Discharge Plan:  - TBD depending on insulin requirement and discharge tube feeding regimen (Bolus vs continuous tube feeding)   - If bolus tube feeding > Lantus plus Humalog   - Patient should have BGs checked 4x a day while on TFs.    Daughter aware to contact Endocrinologist if BG <70mg/dL x1 or >200mg/dL consistently or >400mg/dL x1.   - Followup with Dr Ruht: [facility]ocrinology [facility]: [address] [place] [address]. [address]. [place] [address]. Tel: [phone]- Telemedicine- daughter to schedule.   - Make sure pt has Rx for all DM supplies and insulin

## 2024-11-20 NOTE — PROGRESS NOTE ADULT - ASSESSMENT
Assessment and Plan:   Assessment    A/P: Hx of 73 yo F PMH T2DM on insulin, HTN, HLD, hypothyroidism, RA on Prednisone, Fibromyalgia, cerebral aneurysm s/p repair, acute hypercapnic respiratory failure s/p trach (vent dependent) and PEG (10/22), bedbound presented from home with complaints of possible G tube dislodgement and redness around the site.  Site is red with small amount of pus at insertion site.  Tender to palpation, warm to touch. CT Abdomen/Pelvis done showing dislodgement of G tube with balloon in the anterior abdominal wall with air filled track to stomach.  GI contacted, but unable to replace at bedside given placement of balloon. Surgery consulted--recommend gastrograffin study to eval placement of PEG and possible IR replacement in AM. Given Vancomycin 1 gm IV x 1 due to concern for possible cellulitis.  Of note, patient hemodynamically stable, afebrile, without leukocytosis on presentation.     Wound Consult requested to assist w/ management of  Sacral/bilateral Buttocks chronic stage 4 pressure injury now resolved with some denuded areas of moisture  Left ischium chronic stage 4 pressure injury now resolved  Incontinence Dermatitis  Vascular changes Hands and feet: TTI    Recommendations:     -   Buttocks/ Sacrum / RADHA BID /cavilon and prn soiling   -   TTI cavilon daily offload with CAIR boots  -   Continue w/ attends under pads and Pericare as per protocol  -   Continue w/ low air loss pressure redistribution bed surface   -   Continue turning and positioning w/ offloading assistive devices as per protocol  Moisturize intact skin w/ SWEEN cream BID  Pt will need Group 2 mattress on hospital bed and ROHO cushion for wheel chair upon discharge home  Care as per medicine, will follow w/ you  Upon discharge f/u as outpatient at [facility] [address] [place] [address] [phone]  Daughter, JAYLAN, educated through teachback method care of patients skin and prevention of further injury  Seen &  D/w team & RN  Thank you for this consult  Gabby MCINTYRE-BC, [facility]   [phone]  Nights/ Weekends/ Holidays please call:  General Surgery Consult pager [phone]) for emergencies  Wound PT for multilayer leg wrapping or VAC issues ([phone])

## 2024-11-20 NOTE — CHART NOTE - NSCHARTNOTEFT_GEN_A_CORE
not seen today  sputum Pseudomonas isolates  - developing resistance to Zosyn/Cefepime as anticipated  Urine with VRE - but ua from same day with 0 WBC suggests there is no active UTI  decreased WBC/ProCalcitonin hopeful  continue Zerbaxa for now    discussed with ACP

## 2024-11-20 NOTE — PROGRESS NOTE ADULT - PROBLEM SELECTOR PLAN 9
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  - CT CAP on 11/11 shows feeding tube coils in the stomach with tip within the first portion of the duodenum  - G- J Tube repositioned on 11/13 via upper endoscopy   - Both feeds and medications to be given through feeding/J-port  - G-port is placed to continuous bag drainage for decompression  - Continue Maalox TID for gaseous distention  - GI  is following

## 2024-11-20 NOTE — PROGRESS NOTE ADULT - SUBJECTIVE AND OBJECTIVE BOX
[facility]-- WOUND TEAM -- FOLLOW UP NOTE  --------------------------------------------------------------------------------    24 hour events/subjective:    24 hour events/subjective:  Spouse at bedside   Afebrile  Last temp 37.0 C  Tolerating po w/o n/v  Urostomy/ colostomy/illeostomy functioning  Chart reviewed  Hx of   Patient and spouse re-educated when cellulitis resolved importance to continue lifelong compression  All questions answered to the expressed satisfaction of patient and family member(s)   dc planning ongoing        Diet:  Diet, NPO with Tube Feed:   Tube Feeding Modality: Jejunostomy  [employer] Peptide 1.5 ([id number])  Total Volume for 24 Hours (mL): 960  Continuous  Until Goal Tube Feed Rate (mL per Hour): 40  Tube Feed Duration (in Hours): 24  Tube Feed Start Time: 06:00  Free Water Flush  Pump   Rate (mL per Hour): 50   Frequency: Every Hour  Free Water Flush Instructions:  50ml free water flushes Q1hr  Alfred(7 Gm Arginine/7 Gm Glut/1.2 Gm HMB     Qty per Day:  2  No Carb Prosource (1pkg = 15gms Protein)     Qty per Day:  1  Banatrol TF     Qty per Day:  1/2 packet per daily (11-19-24 @ 10:25)      ROS: General/ Skin/ Msk/ Neuro/ GI see HPI  all other systems negative  pt unable to participate    ALLERGIES & MEDICATIONS  --------------------------------------------------------------------------------  Allergies    walnut (Unknown)  metronidazole (Rash)  Lyrica (Unknown)  penicillin (Unknown)  Pineapple (Unknown)  Tagamet (Unknown)  heparin (Unknown)  Pecans (Unknown)  Hazelnut (Unknown)  meropenem (Rash)    Intolerances          STANDING INPATIENT MEDICATIONS    albuterol/ipratropium for Nebulization 3 milliLiter(s) Nebulizer every 6 hours  artificial  tears Solution 1 Drop(s) Both EYES every 6 hours  ascorbic acid 500 milliGRAM(s) Oral daily  Biotene Dry Mouth Oral Rinse 5 milliLiter(s) Swish and Spit every 6 hours  calcium carbonate 1250 mG  + Vitamin D (OsCal 500 + D) 1 Tablet(s) Oral <User Schedule>  ceftolozane/tazobactam IVPB 3000 milliGRAM(s) IV Intermittent every 8 hours  chlorhexidine 0.12% Liquid 15 milliLiter(s) Oral Mucosa every 12 hours  chlorhexidine 4% Liquid 1 Application(s) Topical daily  dextrose 5%. 1000 milliLiter(s) IV Continuous <Continuous>  dextrose 5%. 1000 milliLiter(s) IV Continuous <Continuous>  dextrose 50% Injectable 25 Gram(s) IV Push once  dextrose 50% Injectable 12.5 Gram(s) IV Push once  diclofenac sodium 1% Gel 2 Gram(s) Topical every 6 hours  escitalopram 10 milliGRAM(s) Oral <User Schedule>  fentaNYL   Patch  12 MICROgram(s)/Hr 1 Patch Transdermal every 72 hours  fentaNYL   Patch  25 MICROgram(s)/Hr 1 Patch Transdermal every 72 hours  FIRST- Mouthwash  BLM 5 milliLiter(s) Swish and Spit every 8 hours  gabapentin Solution 250 milliGRAM(s) Oral <User Schedule>  glucagon  Injectable 1 milliGRAM(s) IntraMuscular once  hemorrhoidal Ointment 1 Application(s) Rectal at bedtime  hydrocortisone sodium succinate Injectable 25 milliGRAM(s) IV Push every 8 hours  influenza  Vaccine (HIGH DOSE) 0.5 milliLiter(s) IntraMuscular once  insulin lispro (ADMELOG) corrective regimen sliding scale   SubCutaneous every 6 hours  insulin lispro Injectable (ADMELOG) 5 Unit(s) SubCutaneous every 6 hours  lactobacillus acidophilus 1 Tablet(s) Oral <User Schedule>  levothyroxine 125 MICROGram(s) Oral <User Schedule>  lidocaine   4% Patch 4 Patch Transdermal every 24 hours  nystatin Powder 1 Application(s) Topical every 8 hours  pantoprazole  Injectable 40 milliGRAM(s) IV Push every 12 hours  sodium chloride 1 Gram(s) Oral every 8 hours  sodium chloride 0.65% Nasal 1 Spray(s) Both Nostrils every 6 hours  triamcinolone 0.1% Oral Paste 1 Application(s) Topical every 8 hours  vancomycin    Solution 125 milliGRAM(s) Oral every 6 hours  witch hazel Pads 1 Application(s) Topical every 12 hours      PRN INPATIENT MEDICATION  acetaminophen   Oral Liquid .. 650 milliGRAM(s) Oral every 6 hours PRN  aluminum hydroxide/magnesium hydroxide/simethicone Suspension 30 milliLiter(s) Enteral Tube every 4 hours PRN        VITALS/PHYSICAL EXAM  --------------------------------------------------------------------------------  T(C): 36.4 (11-20-24 @ 09:58), Max: 36.4 (11-20-24 @ 09:58)  HR: 82 (11-20-24 @ 10:05) (67 - 90)  BP: 168/75 (11-20-24 @ 09:58) (156/69 - 168/75)  RR: 22 (11-20-24 @ 09:58) (20 - 28)  SpO2: 100% (11-20-24 @ 10:05) (100% - 100%)  Wt(kg): --        11-19-24 @ 07:01  -  11-20-24 @ 07:00  --------------------------------------------------------  IN: 2400 mL / OUT: 500 mL / NET: 1900 mL    HEENT: NC/AT, sclera clear, mucosa moist, throat clear, trachea midline, neck supple  Respiratory: Trach  Gastrointestinal: soft NT/ND. (+) fistula moderate drainage  : (+) purewick  Neurology:  nonverbal, can not follow commands  Psych:  difficult to assess  Musculoskeletal:  limited stiff / (+) foot drop  Vascular: BLLE equally cool,  no cyanosis, clubbing,  nor acute ischemia              BLLE edema equal             BLUE edema equal           BLLE DP pulses not palpable    Rt hallux area of darkened tissue discoloration 0.5cm x 0.5cm x 0.0cm     Lt hallux medial area of darkened tissue discoloration 1.3cm x 0.5cm x 0.0cm    Lt medial hallux 0.5cm x 0.5cm x 0.0cm     Lt 5th toe 0.3cm x 0.3cm x 0.0cm     Lt ankle 2.7cm x 1.0cm x 0.0cm     Lt outer ear 6.0cm x 0.5cm x 0.0cm         mild erythema, no increased warmth, no induration, nor fluctuance  Skin:  moist w/ good turgor  Skin:  Sacral/bilateral buttocks with contracted, scarred tissue in and around the gluteal cleft superficial denuded skin         Left ischium with contracted, scarred tissue very close to the anus,          No odor, mild erythema, increased warmth, tenderness, induration, fluctuance        LABS/ CULTURES/ RADIOLOGY:                  7.2    17.83 >-----------<  70       [11-20-24 @ 06:46]              23.2     131  |  93  |  46  ----------------------------<  302      [11-20-24 @ 06:38]  4.3   |  18  |  0.87        Ca     8.4     [11-20-24 @ 06:38]      Mg     1.8     [11-20-24 @ 06:38]      Phos  2.9     [11-20-24 @ 06:38]    TPro  5.6  /  Alb  2.1  /  TBili  1.3  /  DBili  x   /  AST  47  /  ALT  28  /  AlkPhos  124  [11-20-24 @ 06:38]          Blood Gas Arterial - Calcium, Ionized: 1.34 mmol/L (11-18-24 @ 20:15)  Blood Gas Arterial - Calcium, Ionized: 1.34 mmol/L (11-18-24 @ 15:15)      CAPILLARY BLOOD GLUCOSE      POCT Blood Glucose.: 280 mg/dL (20 Nov 2024 11:32)  POCT Blood Glucose.: 289 mg/dL (20 Nov 2024 05:15)  POCT Blood Glucose.: 321 mg/dL (19 Nov 2024 23:35)  POCT Blood Glucose.: 366 mg/dL (19 Nov 2024 20:26)  POCT Blood Glucose.: 419 mg/dL (19 Nov 2024 17:44)                  Culture - Blood (collected 11-18-24 @ 11:47)  Source: .Blood BLOOD  Preliminary Report (11-19-24 @ 16:01):    No growth at 24 hours    Culture - Blood (collected 11-18-24 @ 10:50)  Source: .Blood BLOOD  Preliminary Report (11-19-24 @ 16:01):    No growth at 24 hours          A1C with Estimated Average Glucose Result: 5.8 % (10-14-24 @ 05:08)  A1C with Estimated Average Glucose Result: 6.4 % (08-12-24 @ 07:32)

## 2024-11-20 NOTE — PROGRESS NOTE ADULT - PROBLEM SELECTOR PLAN 2
[] Sepsis  - Sputum cx 11/18: CRE PSA, Urine cx 11/18:E.facelis ( Likely colonized/ UA Unimpressive ), Bld cx 11/18: NGTD   - Hypotension resolved Midodrine dcd, Solu-cortef tapered to 25 mg IVP Q 8 HRS  - Continue Zerbaxa       [] PNA   - Sputum cx 10/8: PSA  - CXR 10/7: Opacification of the right middle lobe may represent pneumonia versus atelectasis  - CT A/P 10/18: ? Multifocal pneumonia  - Completed Initial course of cefepime on 10/12  - Sputum 11/4: PSA; Repeat CT C/A/P 11/6: Consolidative opacity LLL  - Cefepime restarted 11/5-->11/15; in setting of recurrent fevers  - CT CAP on 11/11 showed left greater than right basilar consolidation and patchy groundglass opacity in both lungs consistent with atelectasis and/or pneumonia.     [] C.Diff   - Stool C.diff: + 11/1   - S/p Flagyl 11/3-11/8  - Continue enteral Vanco

## 2024-11-20 NOTE — PROGRESS NOTE ADULT - SUBJECTIVE AND OBJECTIVE BOX
Patient is a 74y old  Female who presents with a chief complaint of Patient admitted with Hypoxemia and episode of Bright red blood per rectum (19 Nov 2024 15:18)      Interval Events: No events reported overnight     REVIEW OF SYSTEMS:  [ ] Positive  [ ] All other systems negative  [ ] Unable to assess ROS because ________    Vital Signs Last 24 Hrs  T(C): 36.3 (11-20-24 @ 05:06), Max: 36.3 (11-19-24 @ 08:55)  T(F): 97.4 (11-20-24 @ 05:06), Max: 97.4 (11-19-24 @ 08:55)  HR: 67 (11-20-24 @ 06:00) (67 - 90)  BP: 162/78 (11-20-24 @ 06:00) (138/61 - 167/73)  RR: 28 (11-20-24 @ 06:00) (20 - 28)  SpO2: 100% (11-20-24 @ 06:00) (99% - 100%)    PHYSICAL EXAM:  HEENT:   [ ]Tracheostomy:  [ ]Pupils equal  [ ]No oral lesions  [ ]Abnormal    SKIN  [ ]No Rash  [ ] Abnormal  [ ] pressure    CARDIAC  [ ]Regular  [ ]Abnormal    PULMONARY  [ ]Bilateral Clear Breath Sounds  [ ]Normal Excursion  [ ]Abnormal    GI  [ ]PEG      [ ] +BS		              [ ]Soft, nondistended, nontender	  [ ]Abnormal    MUSCULOSKELETAL                                   [ ]Bedbound                 [ ]Abnormal    [ ]Ambulatory/OOB to chair                           EXTREMITIES                                         [ ]Normal  [ ]Edema                           NEUROLOGIC  [ ] Normal, non focal  [ ] Focal findings:    PSYCHIATRIC  [ ]Alert and appropriate  [ ] Sedated	 [ ]Agitated    :  Mcbride: [ ] Yes, if yes: Date of Placement:                   [  ] No    LINES: Central Lines [ ] Yes, if yes: Date of Placement                                     [  ] No    HOSPITAL MEDICATIONS:  MEDICATIONS  (STANDING):  albuterol/ipratropium for Nebulization 3 milliLiter(s) Nebulizer every 6 hours  artificial  tears Solution 1 Drop(s) Both EYES every 6 hours  ascorbic acid 500 milliGRAM(s) Oral daily  Biotene Dry Mouth Oral Rinse 5 milliLiter(s) Swish and Spit every 6 hours  calcium carbonate 1250 mG  + Vitamin D (OsCal 500 + D) 1 Tablet(s) Oral <User Schedule>  ceftolozane/tazobactam IVPB 3000 milliGRAM(s) IV Intermittent every 8 hours  chlorhexidine 0.12% Liquid 15 milliLiter(s) Oral Mucosa every 12 hours  chlorhexidine 4% Liquid 1 Application(s) Topical daily  dextrose 5% 1000 milliLiter(s) (125 mL/Hr) IV Continuous <Continuous>  dextrose 5%. 1000 milliLiter(s) (100 mL/Hr) IV Continuous <Continuous>  dextrose 5%. 1000 milliLiter(s) (50 mL/Hr) IV Continuous <Continuous>  dextrose 50% Injectable 25 Gram(s) IV Push once  dextrose 50% Injectable 12.5 Gram(s) IV Push once  diclofenac sodium 1% Gel 2 Gram(s) Topical every 6 hours  escitalopram 10 milliGRAM(s) Oral <User Schedule>  FIRST- Mouthwash  BLM 5 milliLiter(s) Swish and Spit every 8 hours  gabapentin Solution 250 milliGRAM(s) Oral <User Schedule>  glucagon  Injectable 1 milliGRAM(s) IntraMuscular once  hemorrhoidal Ointment 1 Application(s) Rectal at bedtime  hydrocortisone sodium succinate Injectable 50 milliGRAM(s) IV Push every 8 hours  influenza  Vaccine (HIGH DOSE) 0.5 milliLiter(s) IntraMuscular once  insulin glargine Injectable (LANTUS) 12 Unit(s) SubCutaneous <User Schedule>  insulin lispro (ADMELOG) corrective regimen sliding scale   SubCutaneous every 6 hours  insulin lispro Injectable (ADMELOG) 10 Unit(s) SubCutaneous every 6 hours  lactobacillus acidophilus 1 Tablet(s) Oral <User Schedule>  levothyroxine 125 MICROGram(s) Oral <User Schedule>  lidocaine   4% Patch 4 Patch Transdermal every 24 hours  midodrine 10 milliGRAM(s) Oral every 8 hours  nystatin Powder 1 Application(s) Topical every 8 hours  pantoprazole  Injectable 40 milliGRAM(s) IV Push every 12 hours  sodium chloride 1 Gram(s) Oral every 8 hours  sodium chloride 0.65% Nasal 1 Spray(s) Both Nostrils every 6 hours  triamcinolone 0.1% Oral Paste 1 Application(s) Topical every 8 hours  vancomycin    Solution 125 milliGRAM(s) Oral every 6 hours  witch hazel Pads 1 Application(s) Topical every 12 hours    MEDICATIONS  (PRN):  acetaminophen   Oral Liquid .. 650 milliGRAM(s) Oral every 6 hours PRN Temp greater or equal to 38C (100.4F), Mild Pain (1 - 3)  aluminum hydroxide/magnesium hydroxide/simethicone Suspension 30 milliLiter(s) Enteral Tube every 4 hours PRN Dyspepsia      LABS:                        7.2    17.83 )-----------( 70       ( 20 Nov 2024 06:46 )             23.2     11-20    131[L]  |  93[L]  |  46[H]  ----------------------------<  302[H]  4.3   |  18[L]  |  0.87    Ca    8.4      20 Nov 2024 06:38  Phos  2.9     11-20  Mg     1.8     11-20    TPro  5.6[L]  /  Alb  2.1[L]  /  TBili  1.3[H]  /  DBili  x   /  AST  47[H]  /  ALT  28  /  AlkPhos  124[H]  11-20      Urinalysis Basic - ( 20 Nov 2024 06:38 )    Color: x / Appearance: x / SG: x / pH: x  Gluc: 302 mg/dL / Ketone: x  / Bili: x / Urobili: x   Blood: x / Protein: x / Nitrite: x   Leuk Esterase: x / RBC: x / WBC x   Sq Epi: x / Non Sq Epi: x / Bacteria: x      Arterial Blood Gas:  11-18 @ 20:15  7.26/21/140/9/99.1/-16.1  ABG lactate: --  Arterial Blood Gas:  11-18 @ 15:15  7.15/28/72/10/97.1/-17.6  ABG lactate: --  Arterial Blood Gas:  11-18 @ 10:29  7.20/24/98/9/99.0/-17.1  ABG lactate: --      CAPILLARY BLOOD GLUCOSE    MICROBIOLOGY:     RADIOLOGY:  [ ] Reviewed and interpreted by me    Mode: AC/ CMV (Assist Control/ Continuous Mandatory Ventilation)  RR (machine): 28  TV (machine): 450  FiO2: 40  PEEP: 5  ITime: 1  MAP: 20  PIP: 45   Patient is a 74y old  Female who presents with a chief complaint of Patient admitted with Hypoxemia and episode of Bright red blood per rectum (19 Nov 2024 15:18)      Interval Events: No events reported overnight     REVIEW OF SYSTEMS:  [ ] Positive  [ ] All other systems negative  [x] Unable to assess ROS because patient is non-verbal     Vital Signs Last 24 Hrs  T(C): 36.3 (11-20-24 @ 05:06), Max: 36.3 (11-19-24 @ 08:55)  T(F): 97.4 (11-20-24 @ 05:06), Max: 97.4 (11-19-24 @ 08:55)  HR: 67 (11-20-24 @ 06:00) (67 - 90)  BP: 162/78 (11-20-24 @ 06:00) (138/61 - 167/73)  RR: 28 (11-20-24 @ 06:00) (20 - 28)  SpO2: 100% (11-20-24 @ 06:00) (99% - 100%)    PHYSICAL EXAM:  HEENT:   [X] Tracheostomy: #8 distal XLT cuffed Shiley   [X] PERRLA  [ ] No oral lesions  [ ] Abnormal    SKIN  [ ] No Rash  [ ] Abnormal  [X] pressure + Sacral Wound    CARDIAC  [X] Regular  [ ] Abnormal    PULMONARY  [ ] Bilateral Clear Breath Sounds  [ ] Normal Excursion  [X] Abnormal - Diffuses coarse breath sounds / decreased at bases bilaterally     GI  [X] PEG site covered w/ dry dressing ( no active drainage)                [X] Soft, nondistended, nontender	  [X] Abnormal - old PEG site with small opening c/d/i,     MUSCULOSKELETAL                                   [X] Bedbound                 [ ] Abnormal    [ ] Ambulatory/OOB to chair                           EXTREMITIES                                         [ ] Normal  [X] Edema - mod generalized pitting edema                          NEUROLOGIC  [ ] Normal, non focal  [X] Focal findings: Obtunded Minimally unresponsive to voice, grimacing occasionally, not following commands, no purposeful movements in all extremities, minimally withdrawals to noxious stimuli.    PSYCHIATRIC  [x] obtunded  [ ] Sedated	 [ ]Agitated    :  Mcbride: [ ] Yes, if yes: Date of Placement:                   [X] No    LINES: Central Lines [ ] Yes, if yes: Date of Placement                                     [X] No    HOSPITAL MEDICATIONS:  MEDICATIONS  (STANDING):  albuterol/ipratropium for Nebulization 3 milliLiter(s) Nebulizer every 6 hours  artificial  tears Solution 1 Drop(s) Both EYES every 6 hours  ascorbic acid 500 milliGRAM(s) Oral daily  Biotene Dry Mouth Oral Rinse 5 milliLiter(s) Swish and Spit every 6 hours  calcium carbonate 1250 mG  + Vitamin D (OsCal 500 + D) 1 Tablet(s) Oral <User Schedule>  ceftolozane/tazobactam IVPB 3000 milliGRAM(s) IV Intermittent every 8 hours  chlorhexidine 0.12% Liquid 15 milliLiter(s) Oral Mucosa every 12 hours  chlorhexidine 4% Liquid 1 Application(s) Topical daily  dextrose 5% 1000 milliLiter(s) (125 mL/Hr) IV Continuous <Continuous>  dextrose 5%. 1000 milliLiter(s) (100 mL/Hr) IV Continuous <Continuous>  dextrose 5%. 1000 milliLiter(s) (50 mL/Hr) IV Continuous <Continuous>  dextrose 50% Injectable 25 Gram(s) IV Push once  dextrose 50% Injectable 12.5 Gram(s) IV Push once  diclofenac sodium 1% Gel 2 Gram(s) Topical every 6 hours  escitalopram 10 milliGRAM(s) Oral <User Schedule>  FIRST- Mouthwash  BLM 5 milliLiter(s) Swish and Spit every 8 hours  gabapentin Solution 250 milliGRAM(s) Oral <User Schedule>  glucagon  Injectable 1 milliGRAM(s) IntraMuscular once  hemorrhoidal Ointment 1 Application(s) Rectal at bedtime  hydrocortisone sodium succinate Injectable 50 milliGRAM(s) IV Push every 8 hours  influenza  Vaccine (HIGH DOSE) 0.5 milliLiter(s) IntraMuscular once  insulin glargine Injectable (LANTUS) 12 Unit(s) SubCutaneous <User Schedule>  insulin lispro (ADMELOG) corrective regimen sliding scale   SubCutaneous every 6 hours  insulin lispro Injectable (ADMELOG) 10 Unit(s) SubCutaneous every 6 hours  lactobacillus acidophilus 1 Tablet(s) Oral <User Schedule>  levothyroxine 125 MICROGram(s) Oral <User Schedule>  lidocaine   4% Patch 4 Patch Transdermal every 24 hours  midodrine 10 milliGRAM(s) Oral every 8 hours  nystatin Powder 1 Application(s) Topical every 8 hours  pantoprazole  Injectable 40 milliGRAM(s) IV Push every 12 hours  sodium chloride 1 Gram(s) Oral every 8 hours  sodium chloride 0.65% Nasal 1 Spray(s) Both Nostrils every 6 hours  triamcinolone 0.1% Oral Paste 1 Application(s) Topical every 8 hours  vancomycin    Solution 125 milliGRAM(s) Oral every 6 hours  witch hazel Pads 1 Application(s) Topical every 12 hours    MEDICATIONS  (PRN):  acetaminophen   Oral Liquid .. 650 milliGRAM(s) Oral every 6 hours PRN Temp greater or equal to 38C (100.4F), Mild Pain (1 - 3)  aluminum hydroxide/magnesium hydroxide/simethicone Suspension 30 milliLiter(s) Enteral Tube every 4 hours PRN Dyspepsia      LABS:                        7.2    17.83 )-----------( 70       ( 20 Nov 2024 06:46 )             23.2     11-20    131[L]  |  93[L]  |  46[H]  ----------------------------<  302[H]  4.3   |  18[L]  |  0.87    Ca    8.4      20 Nov 2024 06:38  Phos  2.9     11-20  Mg     1.8     11-20    TPro  5.6[L]  /  Alb  2.1[L]  /  TBili  1.3[H]  /  DBili  x   /  AST  47[H]  /  ALT  28  /  AlkPhos  124[H]  11-20      Urinalysis Basic - ( 20 Nov 2024 06:38 )    Color: x / Appearance: x / SG: x / pH: x  Gluc: 302 mg/dL / Ketone: x  / Bili: x / Urobili: x   Blood: x / Protein: x / Nitrite: x   Leuk Esterase: x / RBC: x / WBC x   Sq Epi: x / Non Sq Epi: x / Bacteria: x      Arterial Blood Gas:  11-18 @ 20:15  7.26/21/140/9/99.1/-16.1  ABG lactate: --  Arterial Blood Gas:  11-18 @ 15:15  7.15/28/72/10/97.1/-17.6  ABG lactate: --  Arterial Blood Gas:  11-18 @ 10:29  7.20/24/98/9/99.0/-17.1  ABG lactate: --      CAPILLARY BLOOD GLUCOSE    MICROBIOLOGY:     RADIOLOGY:  [ ] Reviewed and interpreted by me    Mode: AC/ CMV (Assist Control/ Continuous Mandatory Ventilation)  RR (machine): 28  TV (machine): 450  FiO2: 40  PEEP: 5  ITime: 1  MAP: 20  PIP: 45   Patient is a 74y old  Female who presents with a chief complaint of Patient admitted with Hypoxemia and episode of Bright red blood per rectum (19 Nov 2024 15:18)      Interval Events: No events reported overnight     REVIEW OF SYSTEMS:  [ ] Positive  [ ] All other systems negative  [x] Unable to assess ROS because patient is non-verbal     Vital Signs Last 24 Hrs  T(C): 36.3 (11-20-24 @ 05:06), Max: 36.3 (11-19-24 @ 08:55)  T(F): 97.4 (11-20-24 @ 05:06), Max: 97.4 (11-19-24 @ 08:55)  HR: 67 (11-20-24 @ 06:00) (67 - 90)  BP: 162/78 (11-20-24 @ 06:00) (138/61 - 167/73)  RR: 28 (11-20-24 @ 06:00) (20 - 28)  SpO2: 100% (11-20-24 @ 06:00) (99% - 100%)    PHYSICAL EXAM:  HEENT:   [X] Tracheostomy: #8 distal XLT cuffed Shiley   [X] PERRLA  [ ] No oral lesions  [ ] Abnormal    SKIN  [ ] No Rash  [ ] Abnormal  [X] pressure + Sacral Wound    CARDIAC  [X] Regular  [ ] Abnormal    PULMONARY  [ ] Bilateral Clear Breath Sounds  [ ] Normal Excursion  [X] Abnormal - Diffuses coarse breath sounds / decreased at bases bilaterally     GI  [X] PEG site covered w/ dry dressing              [X] Soft, nondistended, nontender	  [X] Abnormal; old PEG stoma site with small opening c/d/i,     MUSCULOSKELETAL                                   [X] Bedbound                 [ ] Abnormal    [ ] Ambulatory/OOB to chair                           EXTREMITIES                                         [ ] Normal  [X] Edema - mod generalized pitting edema                          NEUROLOGIC  [ ] Normal, non focal  [X] Focal findings: Obtunded Minimally unresponsive to voice, grimacing occasionally, not following commands, no purposeful movements in all extremities, withdraws to noxious stimuli.    PSYCHIATRIC  [ ] obtunded  [X] Sedated	 [ ]Agitated    :  Mcbride: [ ] Yes, if yes: Date of Placement:                   [X] No    LINES: Central Lines [ ] Yes, if yes: Date of Placement                                     [X] No    HOSPITAL MEDICATIONS:  MEDICATIONS  (STANDING):  albuterol/ipratropium for Nebulization 3 milliLiter(s) Nebulizer every 6 hours  artificial  tears Solution 1 Drop(s) Both EYES every 6 hours  ascorbic acid 500 milliGRAM(s) Oral daily  Biotene Dry Mouth Oral Rinse 5 milliLiter(s) Swish and Spit every 6 hours  calcium carbonate 1250 mG  + Vitamin D (OsCal 500 + D) 1 Tablet(s) Oral <User Schedule>  ceftolozane/tazobactam IVPB 3000 milliGRAM(s) IV Intermittent every 8 hours  chlorhexidine 0.12% Liquid 15 milliLiter(s) Oral Mucosa every 12 hours  chlorhexidine 4% Liquid 1 Application(s) Topical daily  dextrose 5% 1000 milliLiter(s) (125 mL/Hr) IV Continuous <Continuous>  dextrose 5%. 1000 milliLiter(s) (100 mL/Hr) IV Continuous <Continuous>  dextrose 5%. 1000 milliLiter(s) (50 mL/Hr) IV Continuous <Continuous>  dextrose 50% Injectable 25 Gram(s) IV Push once  dextrose 50% Injectable 12.5 Gram(s) IV Push once  diclofenac sodium 1% Gel 2 Gram(s) Topical every 6 hours  escitalopram 10 milliGRAM(s) Oral <User Schedule>  FIRST- Mouthwash  BLM 5 milliLiter(s) Swish and Spit every 8 hours  gabapentin Solution 250 milliGRAM(s) Oral <User Schedule>  glucagon  Injectable 1 milliGRAM(s) IntraMuscular once  hemorrhoidal Ointment 1 Application(s) Rectal at bedtime  hydrocortisone sodium succinate Injectable 50 milliGRAM(s) IV Push every 8 hours  influenza  Vaccine (HIGH DOSE) 0.5 milliLiter(s) IntraMuscular once  insulin glargine Injectable (LANTUS) 12 Unit(s) SubCutaneous <User Schedule>  insulin lispro (ADMELOG) corrective regimen sliding scale   SubCutaneous every 6 hours  insulin lispro Injectable (ADMELOG) 10 Unit(s) SubCutaneous every 6 hours  lactobacillus acidophilus 1 Tablet(s) Oral <User Schedule>  levothyroxine 125 MICROGram(s) Oral <User Schedule>  lidocaine   4% Patch 4 Patch Transdermal every 24 hours  midodrine 10 milliGRAM(s) Oral every 8 hours  nystatin Powder 1 Application(s) Topical every 8 hours  pantoprazole  Injectable 40 milliGRAM(s) IV Push every 12 hours  sodium chloride 1 Gram(s) Oral every 8 hours  sodium chloride 0.65% Nasal 1 Spray(s) Both Nostrils every 6 hours  triamcinolone 0.1% Oral Paste 1 Application(s) Topical every 8 hours  vancomycin    Solution 125 milliGRAM(s) Oral every 6 hours  witch hazel Pads 1 Application(s) Topical every 12 hours    MEDICATIONS  (PRN):  acetaminophen   Oral Liquid .. 650 milliGRAM(s) Oral every 6 hours PRN Temp greater or equal to 38C (100.4F), Mild Pain (1 - 3)  aluminum hydroxide/magnesium hydroxide/simethicone Suspension 30 milliLiter(s) Enteral Tube every 4 hours PRN Dyspepsia      LABS:                        7.2    17.83 )-----------( 70       ( 20 Nov 2024 06:46 )             23.2     11-20    131[L]  |  93[L]  |  46[H]  ----------------------------<  302[H]  4.3   |  18[L]  |  0.87    Ca    8.4      20 Nov 2024 06:38  Phos  2.9     11-20  Mg     1.8     11-20    TPro  5.6[L]  /  Alb  2.1[L]  /  TBili  1.3[H]  /  DBili  x   /  AST  47[H]  /  ALT  28  /  AlkPhos  124[H]  11-20      Urinalysis Basic - ( 20 Nov 2024 06:38 )    Color: x / Appearance: x / SG: x / pH: x  Gluc: 302 mg/dL / Ketone: x  / Bili: x / Urobili: x   Blood: x / Protein: x / Nitrite: x   Leuk Esterase: x / RBC: x / WBC x   Sq Epi: x / Non Sq Epi: x / Bacteria: x      Arterial Blood Gas:  11-18 @ 20:15  7.26/21/140/9/99.1/-16.1  ABG lactate: --  Arterial Blood Gas:  11-18 @ 15:15  7.15/28/72/10/97.1/-17.6  ABG lactate: --  Arterial Blood Gas:  11-18 @ 10:29  7.20/24/98/9/99.0/-17.1  ABG lactate: --      CAPILLARY BLOOD GLUCOSE    MICROBIOLOGY:     RADIOLOGY:  [ ] Reviewed and interpreted by me    Mode: AC/ CMV (Assist Control/ Continuous Mandatory Ventilation)  RR (machine): 28  TV (machine): 450  FiO2: 40  PEEP: 5  ITime: 1  MAP: 20  PIP: 45

## 2024-11-20 NOTE — PROGRESS NOTE ADULT - PROBLEM SELECTOR PLAN 5
- Patient on Lantus and Humalog at home    - Patient with Hyperglycemia yesterday   - D5W HC03 Drip dcd, Steroids tapered   - D/w ID Pharmacy today if abx can be changed to NS if feasible will change order  - Cont Lispro 5 units q 6 hrs / Lantus 10 units ordered to begin 11/21   - Cont  ISS  - Endocrine following

## 2024-11-20 NOTE — PROGRESS NOTE ADULT - ASSESSMENT
75 yo F PMH T2DM on insulin, HTN, HLD, hypothyroidism, RA on Prednisone, Fibromyalgia, cerebral aneurysm s/p repair, chronic hypercapnic respiratory failure s/p trach (vent dependent) and chronic PEG 2022, recurrent C. diff infection (follows with Dr. Newman), persistent GJ tube leaks now s/p GC fistula repair 8/12 BIBEMS on 10/7 with daughter for respiratory distress, hypoxia to 88%.  Pt also noted to have rectal bleeding week prior to presentation requiring transfusion 5 days ago in ED as per daughter. Daughter also reports brownish discharge from G-J tube. In ED, pt afebrile, fiO2 96-98% on 40% on ventilator.  Chest X-ray w/ opacification of right lung concerning for possible PNA.  Admitted to RCU for further management. Sputum cxs grew PSA; treated with course of Cefepime. Patient with decreased H+H received 1 unit of PRBCS on 10/10.  10/14 EGD w/ Dr. Rosales for PEGJ exchange and clippings placed for closure of fistula site.  Persistent fevers treated with meropenem and vanc (d/c'd 10/20).  CT A/P without infectious source.  Continued fevers and persistent leukocytosis 11/1 CDiff positive - po vanco started, IV flagyl added (11/3-11/8). Head CT performed on 11/6 due to concern of lethargy no acute intracranial findings were noted; likely related to metabolic encephalopathy. Patient continued to have persistent fevers and was empirically treated with Cefepime (11/5-11/9) for CR PSA in sputum cx. CT C/A/P performed 11/6 c/w consolidative opacity LLL. Evening of 11/10-11/11, WBC 15 --> 21, procal 1 --> 43, lactate 3.2 -- pt also febrile. Infectious w/u sent 11/10 -- BCx negative, trach aspirate cx pending, UA negative. CT CAP on 11/11 showed left greater than right basilar consolidation and patchy ground glass opacity in both lungs consistent with atelectasis and/or pneumonia; Feeding tube coils in the stomach with tip within the first portion of the duodenum, No bowel obstruction. Cefepime restarted 11/11-11/15 with improvement in leukocytosis. Patient underwent Endoscopic adjustment of peg 11/13 with Dr. Rosales. XR tube check performed. Patient became Septic again on 11/19 requiring Midodrine, Solucortef and HC03 drip; Patient was empirically placed on Zerbaxa.     11/20: No events reported overnight. Ongoing GOC Discussions; MOLST Updated yesterday evening to state No pressors and No transfer to ICU if indicated. Repeat ABG performed this morning acidosis has resolved will DC HC03 drip and decrease RR to 20 with repeat VBG in AM. Patient without hypotension will dc Midodrine and lower Solu-cortef to 25 mg q 8hrs. Patients cxs from 11/18; Blood cx: NGTD, Sputum cx: CRE PSA( sensitive to Cef/sloan) and Urine cx growing E.facelis ( likely colonized as UA Negative for WBC, Nitrates and only trace Leuks). Case d/w ID will continue Cef/ Sloan in the meantime.

## 2024-11-20 NOTE — PROGRESS NOTE ADULT - ASSESSMENT
75 yo F w/h/o controlled T2DM (A1C 5.8%) on insulin at home. Also HTN, HLD, hypothyroidism, RA on Prednisone, Fibromyalgia, cerebral aneurysm s/p repair, chronic hypercapnic respiratory failure s/p trach (vent dependent) and chronic PEG 2022, recurrent C. diff infection (follows with Dr. 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 S/p antibiotics and s/p GJ tube exchanges on 10/14, s/p PEG replacement 10/21. Endocrine consulted for Type 2 DM management while on tube feeding. Patient remains lethargic, now on 24hr TFs now at goal rate. Positive diarrhea/ C diff. No hypoglycemia. Hyperglycemia to 419mg/dL most likely 2/2 to TFs at goal now and continues on D5% sodium bicarb gtt @125mL/hr and Hydrocortisone 25mg q8h. BG <300mg/dL this am. Lantus increased to 12u QAM for this morning for better BG control and standing insulin with meals started given post-prandial hyperglycemia 2/2 to steroids and D5% drip. Also noted Cef Sloan IV abx started in D5%- please change to NS solution if compatible discussed with primary team. Endocrine will closely monitor BG and adjust insulin as needed for BG goal 100-180mg/dL inpatient. Please inform Endocrine team for any changes in tube feeding, D5w infusion and steroid!    Home DM medications: Lantus 35 units at HS, Humalog 26 units with # 1 feeding, 22 units with #2 tube feeding, 20 units with #3 tube feeding and  Humalog correction scale (  2 units for -539, 4 units for -250, 6 units for -300, 8units for -350, 10 units for -400, 12 units for -450)   while on [employer] formula 3 cans/day, slow bolus( run at 80 ml/hr total volume 960 ml/day> 1st can around 6:30-8:30, 2nd can around 3 pm, 3rd can around 8-9 pm) plus Banatrol 1/2 packet BID, was increasing to 1 packet BID, plus Kefir 10 oz/day ( divided in multiple times throughout the day).                         73 yo F w/h/o controlled T2DM (A1C 5.8%) on insulin at home. Also HTN, HLD, hypothyroidism, RA on Prednisone, Fibromyalgia, cerebral aneurysm s/p repair, chronic hypercapnic respiratory failure s/p trach (vent dependent) and chronic PEG 2022, recurrent C. diff infection (follows with Dr. 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 S/p antibiotics and s/p GJ tube exchanges on 10/14, s/p PEG replacement 10/21. Endocrine consulted for Type 2 DM management while on tube feeding. Patient remains lethargic, now on 24hr TFs now at goal rate. Positive diarrhea/ C diff. No hypoglycemia. Hyperglycemia to 419mg/dL most likely 2/2 to TFs at goal now and continues on D5% sodium bicarb gtt @125mL/hr and Hydrocortisone 25mg q8h. BG <300mg/dL this am. Lantus increased to 12u QAM for this morning for better BG control and standing insulin with meals started given post-prandial hyperglycemia 2/2 to steroids and D5% drip. BGs q6h consistently 200s this am- will adjust Admelog to 13u q6h while on TFs. Also noted Cef Sloan IV abx started in D5%- please change to NS solution if compatible discussed with primary team. Endocrine will closely monitor BG and adjust insulin as needed for BG goal 100-180mg/dL inpatient. Please inform Endocrine team for any changes in tube feeding, D5w infusion and steroid!    Home DM medications: Lantus 35 units at HS, Humalog 26 units with # 1 feeding, 22 units with #2 tube feeding, 20 units with #3 tube feeding and  Humalog correction scale (  2 units for -520, 4 units for -250, 6 units for -300, 8units for -350, 10 units for -400, 12 units for -450)   while on [employer] formula 3 cans/day, slow bolus( run at 80 ml/hr total volume 960 ml/day> 1st can around 6:30-8:30, 2nd can around 3 pm, 3rd can around 8-9 pm) plus Banatrol 1/2 packet BID, was increasing to 1 packet BID, plus Kefir 10 oz/day ( divided in multiple times throughout the day).                         75 yo F w/h/o controlled T2DM (A1C 5.8%) on insulin at home. Also HTN, HLD, hypothyroidism, RA on Prednisone, Fibromyalgia, cerebral aneurysm s/p repair, chronic hypercapnic respiratory failure s/p trach (vent dependent) and chronic PEG 2022, recurrent C. diff infection (follows with Dr. 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 S/p antibiotics and s/p GJ tube exchanges on 10/14, s/p PEG replacement 10/21. Endocrine consulted for Type 2 DM management while on tube feeding. Patient remains lethargic, now on 24hr TFs now at goal rate. Positive diarrhea/ C diff. No hypoglycemia. Hyperglycemia to 419mg/dL most likely 2/2 to TFs at goal now, D5% sodium bicarb gtt @125mL/hr off/will remain off and Hydrocortisone 25mg q8h. BG <300mg/dL this am. Lantus increased to 12u QAM for this morning for better BG control and standing insulin with meals started given post-prandial hyperglycemia 2/2 to steroids and D5% drip. BGs q6h consistently 200s this am with D5 drip- will adjust Lantus to 10u QAM and Admelog 5u q6h while on TFs to prevent hypoglycemia although patient may need insulin adjustments. Also noted Cef Sloan IV abx started in D5%- please change to NS solution if compatible discussed with primary team. Endocrine will closely monitor BG and adjust insulin as needed for BG goal 100-180mg/dL inpatient. Please inform Endocrine team for any changes in tube feeding, D5w infusion and steroid!    Home DM medications: Lantus 35 units at HS, Humalog 26 units with # 1 feeding, 22 units with #2 tube feeding, 20 units with #3 tube feeding and  Humalog correction scale (  2 units for -066, 4 units for -250, 6 units for -300, 8units for -350, 10 units for -400, 12 units for -450)   while on [employer] formula 3 cans/day, slow bolus( run at 80 ml/hr total volume 960 ml/day> 1st can around 6:30-8:30, 2nd can around 3 pm, 3rd can around 8-9 pm) plus Banatrol 1/2 packet BID, was increasing to 1 packet BID, plus Kefir 10 oz/day ( divided in multiple times throughout the day).

## 2024-11-20 NOTE — PROGRESS NOTE ADULT - NUTRITIONAL ASSESSMENT
Diet, NPO with Tube Feed:   Tube Feeding Modality: Jejunostomy  Casie Previstar Peptide 1.5 (KFPEPT1.5RTH)  Total Volume for 24 Hours (mL): 960  Continuous  Until Goal Tube Feed Rate (mL per Hour): 40  Tube Feed Duration (in Hours): 24  Tube Feed Start Time: 06:00  Free Water Flush  Pump   Rate (mL per Hour): 50   Frequency: Every Hour  Free Water Flush Instructions:  50ml free water flushes Q1hr  Alfred(7 Gm Arginine/7 Gm Glut/1.2 Gm HMB     Qty per Day:  2  No Carb Prosource (1pkg = 15gms Protein)     Qty per Day:  1  Banatrol TF     Qty per Day:  1/2 packet per daily (11-19-24 @ 10:25) [Active]

## 2024-11-20 NOTE — PROGRESS NOTE ADULT - SUBJECTIVE AND OBJECTIVE BOX
Patient seen today for follow up inpatient Diabetes Mellitus management.    Chief Complaint: Type 2 Diabetes Mellitus    INTERVAL HX:  Patient seen in [facility] RCU1 505 W1. Patient is lethargic, not following commands. Patient remains on 24hr TFs [employer] @40cc/hr, at goal now, tolerating but positive for diarrhea. BGs overnight hyperglycemic to 300s-419mg/dL -> TFs were held at 0000 to help with BGs <300mg/dL. Restarted around 0600. Patient also continues on Dextrose 5% with sodium biarb drip @125mg/dL. No hypoglycemia. BG this am 289mg/dL. Blood glucose levels in the last 24hrs have been 289-419mg/dL.     Review of Systems:  General: As above.  Respiratory: FARAZ d/t mental status; trached on ventilator.   Gastrointestinal: FARAZ d/t mental status; trached on ventilator.   Endocrine: FARAZ d/t mental status; trached on ventilator.     Allergies  walnut (Unknown)  metronidazole (Rash)  Lyrica (Unknown)  penicillin (Unknown)  Pineapple (Unknown)  Tagamet (Unknown)  heparin (Unknown)  Pecans (Unknown)  Hazelnut (Unknown)  meropenem (Rash)      Intolerances  None.       MEDICATIONS  (STANDING):  acetaminophen   Oral Liquid .. 650 milliGRAM(s) Oral every 6 hours PRN  albuterol/ipratropium for Nebulization 3 milliLiter(s) Nebulizer every 6 hours  aluminum hydroxide/magnesium hydroxide/simethicone Suspension 30 milliLiter(s) Enteral Tube every 4 hours PRN  artificial  tears Solution 1 Drop(s) Both EYES every 6 hours  ascorbic acid 500 milliGRAM(s) Oral daily  Biotene Dry Mouth Oral Rinse 5 milliLiter(s) Swish and Spit every 6 hours  calcium carbonate 1250 mG  + Vitamin D (OsCal 500 + D) 1 Tablet(s) Oral <User Schedule>  ceftolozane/tazobactam IVPB 3000 milliGRAM(s) IV Intermittent every 8 hours  chlorhexidine 0.12% Liquid 15 milliLiter(s) Oral Mucosa every 12 hours  chlorhexidine 4% Liquid 1 Application(s) Topical daily  dextrose 5% 1000 milliLiter(s) IV Continuous <Continuous>  dextrose 5%. 1000 milliLiter(s) IV Continuous <Continuous>  dextrose 5%. 1000 milliLiter(s) IV Continuous <Continuous>  dextrose 50% Injectable 25 Gram(s) IV Push once  dextrose 50% Injectable 12.5 Gram(s) IV Push once  diclofenac sodium 1% Gel 2 Gram(s) Topical every 6 hours  escitalopram 10 milliGRAM(s) Oral <User Schedule>  fentaNYL   Patch  12 MICROgram(s)/Hr 1 Patch Transdermal every 72 hours  fentaNYL   Patch  25 MICROgram(s)/Hr 1 Patch Transdermal every 72 hours  FIRST- Mouthwash  BLM 5 milliLiter(s) Swish and Spit every 8 hours  gabapentin Solution 250 milliGRAM(s) Oral <User Schedule>  glucagon  Injectable 1 milliGRAM(s) IntraMuscular once  hemorrhoidal Ointment 1 Application(s) Rectal at bedtime  hydrocortisone sodium succinate Injectable 25 milliGRAM(s) IV Push every 8 hours  influenza  Vaccine (HIGH DOSE) 0.5 milliLiter(s) IntraMuscular once  insulin glargine Injectable (LANTUS) 12 Unit(s) SubCutaneous <User Schedule>  insulin lispro (ADMELOG) corrective regimen sliding scale   SubCutaneous every 6 hours  insulin lispro Injectable (ADMELOG) 10 Unit(s) SubCutaneous every 6 hours  lactobacillus acidophilus 1 Tablet(s) Oral <User Schedule>  levothyroxine 125 MICROGram(s) Oral <User Schedule>  lidocaine   4% Patch 4 Patch Transdermal every 24 hours  nystatin Powder 1 Application(s) Topical every 8 hours  pantoprazole  Injectable 40 milliGRAM(s) IV Push every 12 hours  sodium chloride 1 Gram(s) Oral every 8 hours  sodium chloride 0.65% Nasal 1 Spray(s) Both Nostrils every 6 hours  triamcinolone 0.1% Oral Paste 1 Application(s) Topical every 8 hours  vancomycin    Solution 125 milliGRAM(s) Oral every 6 hours  witch hazel Pads 1 Application(s) Topical every 12 hours      dextrose 50% Injectable 25 Gram(s) IV Push once  dextrose 50% Injectable 12.5 Gram(s) IV Push once  glucagon  Injectable 1 milliGRAM(s) IntraMuscular once  hydrocortisone sodium succinate Injectable 25 milliGRAM(s) IV Push every 8 hours  insulin glargine Injectable (LANTUS) 12 Unit(s) SubCutaneous <User Schedule>  insulin lispro (ADMELOG) corrective regimen sliding scale   SubCutaneous every 6 hours  insulin lispro Injectable (ADMELOG) 10 Unit(s) SubCutaneous every 6 hours  levothyroxine 125 MICROGram(s) Oral <User Schedule>      insulin lispro (ADMELOG) corrective regimen sliding scale   SubCutaneous every 6 hours  insulin lispro Injectable (ADMELOG) 10 Unit(s) SubCutaneous every 6 hours      PHYSICAL EXAM:  VITALS:   T(C): 36.4 (11-20-24 @ 09:58), Max: 36.4 (11-20-24 @ 09:58)  HR: 82 (11-20-24 @ 10:05) (67 - 90)  BP: 168/75 (11-20-24 @ 09:58) (156/69 - 168/75)  RR: 22 (11-20-24 @ 09:58) (20 - 28)  SpO2: 100% (11-20-24 @ 10:05) (100% - 100%)    GENERAL: In no acute distress  Respiratory: Respirations unlabored, remains trached on ventilator   Extremities: Warm and dry, +BLE/BUE edema  NEURO: Lethargic, unable to follow commands    LABS:  POCT Blood Glucose.: 289 mg/dL (11-20-24 @ 05:15)  POCT Blood Glucose.: 321 mg/dL (11-19-24 @ 23:35)  POCT Blood Glucose.: 366 mg/dL (11-19-24 @ 20:26)  POCT Blood Glucose.: 419 mg/dL (11-19-24 @ 17:44)  POCT Blood Glucose.: 323 mg/dL (11-19-24 @ 11:34)  POCT Blood Glucose.: 218 mg/dL (11-19-24 @ 06:05)  POCT Blood Glucose.: 269 mg/dL (11-19-24 @ 00:51)  POCT Blood Glucose.: 203 mg/dL (11-18-24 @ 17:37)  POCT Blood Glucose.: 128 mg/dL (11-18-24 @ 11:58)  POCT Blood Glucose.: 91 mg/dL (11-18-24 @ 05:28)  POCT Blood Glucose.: 196 mg/dL (11-17-24 @ 23:25)  POCT Blood Glucose.: 231 mg/dL (11-17-24 @ 18:07)  POCT Blood Glucose.: 146 mg/dL (11-17-24 @ 12:17)                          7.2    17.83 )-----------( 70       ( 20 Nov 2024 06:46 )             23.2     11-20    131[L]  |  93[L]  |  46[H]  ----------------------------<  302[H]  4.3   |  18[L]  |  0.87    Ca    8.4      20 Nov 2024 06:38  Phos  2.9     11-20  Mg     1.8     11-20    TPro  5.6[L]  /  Alb  2.1[L]  /  TBili  1.3[H]  /  DBili  x   /  AST  47[H]  /  ALT  28  /  AlkPhos  124[H]  11-20    LIVER FUNCTIONS - ( 20 Nov 2024 06:38 )  Alb: 2.1 g/dL / Pro: 5.6 g/dL / ALK PHOS: 124 U/L / ALT: 28 U/L / AST: 47 U/L / GGT: x                 Urinalysis Basic - ( 20 Nov 2024 06:38 )    Color: x / Appearance: x / SG: x / pH: x  Gluc: 302 mg/dL / Ketone: x  / Bili: x / Urobili: x   Blood: x / Protein: x / Nitrite: x   Leuk Esterase: x / RBC: x / WBC x   Sq Epi: x / Non Sq Epi: x / Bacteria: x        Culture - Urine (collected 18 Nov 2024 16:46)  Source: Catheterized Catheterized  Preliminary Report (19 Nov 2024 18:51):    >100,000 CFU/ml Enterococcus faecium    Culture - Blood (collected 18 Nov 2024 11:47)  Source: .Blood BLOOD  Preliminary Report (19 Nov 2024 16:01):    No growth at 24 hours    Culture - Sputum (collected 18 Nov 2024 11:19)  Source: .Sputum Sputum  Gram Stain (18 Nov 2024 19:26):    Moderate polymorphonuclear leukocytes per low power field    No Squamous epithelial cells per low power field    Numerous Gram Negative Rods per oil power field    Numerous Gram Positive Rods per oil power field  Preliminary Report (20 Nov 2024 10:39):    Mixed gram negative rods including Numerous Pseudomonas aeruginosa    Commensal vinay consistent with body site    Culture - Blood (collected 18 Nov 2024 10:50)  Source: .Blood BLOOD  Preliminary Report (19 Nov 2024 16:01):    No growth at 24 hours        A1C with Estimated Average Glucose Result: A1C with Estimated Average Glucose Result: 5.8 % (10-14-24 @ 05:08)  A1C with Estimated Average Glucose Result: 6.4 % (08-12-24 @ 07:32)         Patient seen today for follow up inpatient Diabetes Mellitus management.    Chief Complaint: Type 2 Diabetes Mellitus    INTERVAL HX:  Patient seen in [facility] RCU1 505 W1. Patient is lethargic, not following commands. Patient remains on 24hr TFs [employer] @40cc/hr, at goal now, tolerating but positive for diarrhea. BGs overnight hyperglycemic to 300s-419mg/dL -> TFs were held at 0000 to help with BGs <300mg/dL. Restarted around 0600. Patient also continues on Dextrose 5% with sodium biarb 150mEq drip @125mg/dL. Now tapered from Hydrocortisone 50mg q8h to 25mg q8h today. No hypoglycemia. BG this am 289mg/dL. Blood glucose levels in the last 24hrs have been 289-419mg/dL.     Review of Systems:  General: As above.  Respiratory: FARAZ d/t mental status; trached on ventilator.   Gastrointestinal: FARAZ d/t mental status; trached on ventilator.   Endocrine: FARAZ d/t mental status; trached on ventilator.     Allergies  walnut (Unknown)  metronidazole (Rash)  Lyrica (Unknown)  penicillin (Unknown)  Pineapple (Unknown)  Tagamet (Unknown)  heparin (Unknown)  Pecans (Unknown)  Hazelnut (Unknown)  meropenem (Rash)      Intolerances  None.       MEDICATIONS  (STANDING):  acetaminophen   Oral Liquid .. 650 milliGRAM(s) Oral every 6 hours PRN  albuterol/ipratropium for Nebulization 3 milliLiter(s) Nebulizer every 6 hours  aluminum hydroxide/magnesium hydroxide/simethicone Suspension 30 milliLiter(s) Enteral Tube every 4 hours PRN  artificial  tears Solution 1 Drop(s) Both EYES every 6 hours  ascorbic acid 500 milliGRAM(s) Oral daily  Biotene Dry Mouth Oral Rinse 5 milliLiter(s) Swish and Spit every 6 hours  calcium carbonate 1250 mG  + Vitamin D (OsCal 500 + D) 1 Tablet(s) Oral <User Schedule>  ceftolozane/tazobactam IVPB 3000 milliGRAM(s) IV Intermittent every 8 hours  chlorhexidine 0.12% Liquid 15 milliLiter(s) Oral Mucosa every 12 hours  chlorhexidine 4% Liquid 1 Application(s) Topical daily  dextrose 5% 1000 milliLiter(s) IV Continuous <Continuous>  dextrose 5%. 1000 milliLiter(s) IV Continuous <Continuous>  dextrose 5%. 1000 milliLiter(s) IV Continuous <Continuous>  dextrose 50% Injectable 25 Gram(s) IV Push once  dextrose 50% Injectable 12.5 Gram(s) IV Push once  diclofenac sodium 1% Gel 2 Gram(s) Topical every 6 hours  escitalopram 10 milliGRAM(s) Oral <User Schedule>  fentaNYL   Patch  12 MICROgram(s)/Hr 1 Patch Transdermal every 72 hours  fentaNYL   Patch  25 MICROgram(s)/Hr 1 Patch Transdermal every 72 hours  FIRST- Mouthwash  BLM 5 milliLiter(s) Swish and Spit every 8 hours  gabapentin Solution 250 milliGRAM(s) Oral <User Schedule>  glucagon  Injectable 1 milliGRAM(s) IntraMuscular once  hemorrhoidal Ointment 1 Application(s) Rectal at bedtime  hydrocortisone sodium succinate Injectable 25 milliGRAM(s) IV Push every 8 hours  influenza  Vaccine (HIGH DOSE) 0.5 milliLiter(s) IntraMuscular once  insulin glargine Injectable (LANTUS) 12 Unit(s) SubCutaneous <User Schedule>  insulin lispro (ADMELOG) corrective regimen sliding scale   SubCutaneous every 6 hours  insulin lispro Injectable (ADMELOG) 10 Unit(s) SubCutaneous every 6 hours  lactobacillus acidophilus 1 Tablet(s) Oral <User Schedule>  levothyroxine 125 MICROGram(s) Oral <User Schedule>  lidocaine   4% Patch 4 Patch Transdermal every 24 hours  nystatin Powder 1 Application(s) Topical every 8 hours  pantoprazole  Injectable 40 milliGRAM(s) IV Push every 12 hours  sodium chloride 1 Gram(s) Oral every 8 hours  sodium chloride 0.65% Nasal 1 Spray(s) Both Nostrils every 6 hours  triamcinolone 0.1% Oral Paste 1 Application(s) Topical every 8 hours  vancomycin    Solution 125 milliGRAM(s) Oral every 6 hours  witch hazel Pads 1 Application(s) Topical every 12 hours      dextrose 50% Injectable 25 Gram(s) IV Push once  dextrose 50% Injectable 12.5 Gram(s) IV Push once  glucagon  Injectable 1 milliGRAM(s) IntraMuscular once  hydrocortisone sodium succinate Injectable 25 milliGRAM(s) IV Push every 8 hours  insulin glargine Injectable (LANTUS) 12 Unit(s) SubCutaneous <User Schedule>  insulin lispro (ADMELOG) corrective regimen sliding scale   SubCutaneous every 6 hours  insulin lispro Injectable (ADMELOG) 10 Unit(s) SubCutaneous every 6 hours  levothyroxine 125 MICROGram(s) Oral <User Schedule>      insulin lispro (ADMELOG) corrective regimen sliding scale   SubCutaneous every 6 hours  insulin lispro Injectable (ADMELOG) 10 Unit(s) SubCutaneous every 6 hours      PHYSICAL EXAM:  VITALS:   T(C): 36.4 (11-20-24 @ 09:58), Max: 36.4 (11-20-24 @ 09:58)  HR: 82 (11-20-24 @ 10:05) (67 - 90)  BP: 168/75 (11-20-24 @ 09:58) (156/69 - 168/75)  RR: 22 (11-20-24 @ 09:58) (20 - 28)  SpO2: 100% (11-20-24 @ 10:05) (100% - 100%)    GENERAL: In no acute distress  Respiratory: Respirations unlabored, remains trached on ventilator   Extremities: Warm and dry, +BLE/BUE edema  NEURO: Lethargic, unable to follow commands    LABS:  POCT Blood Glucose.: 289 mg/dL (11-20-24 @ 05:15)  POCT Blood Glucose.: 321 mg/dL (11-19-24 @ 23:35)  POCT Blood Glucose.: 366 mg/dL (11-19-24 @ 20:26)  POCT Blood Glucose.: 419 mg/dL (11-19-24 @ 17:44)  POCT Blood Glucose.: 323 mg/dL (11-19-24 @ 11:34)  POCT Blood Glucose.: 218 mg/dL (11-19-24 @ 06:05)  POCT Blood Glucose.: 269 mg/dL (11-19-24 @ 00:51)  POCT Blood Glucose.: 203 mg/dL (11-18-24 @ 17:37)  POCT Blood Glucose.: 128 mg/dL (11-18-24 @ 11:58)  POCT Blood Glucose.: 91 mg/dL (11-18-24 @ 05:28)  POCT Blood Glucose.: 196 mg/dL (11-17-24 @ 23:25)  POCT Blood Glucose.: 231 mg/dL (11-17-24 @ 18:07)  POCT Blood Glucose.: 146 mg/dL (11-17-24 @ 12:17)                          7.2    17.83 )-----------( 70       ( 20 Nov 2024 06:46 )             23.2     11-20    131[L]  |  93[L]  |  46[H]  ----------------------------<  302[H]  4.3   |  18[L]  |  0.87    Ca    8.4      20 Nov 2024 06:38  Phos  2.9     11-20  Mg     1.8     11-20    TPro  5.6[L]  /  Alb  2.1[L]  /  TBili  1.3[H]  /  DBili  x   /  AST  47[H]  /  ALT  28  /  AlkPhos  124[H]  11-20    LIVER FUNCTIONS - ( 20 Nov 2024 06:38 )  Alb: 2.1 g/dL / Pro: 5.6 g/dL / ALK PHOS: 124 U/L / ALT: 28 U/L / AST: 47 U/L / GGT: x                 Urinalysis Basic - ( 20 Nov 2024 06:38 )    Color: x / Appearance: x / SG: x / pH: x  Gluc: 302 mg/dL / Ketone: x  / Bili: x / Urobili: x   Blood: x / Protein: x / Nitrite: x   Leuk Esterase: x / RBC: x / WBC x   Sq Epi: x / Non Sq Epi: x / Bacteria: x        Culture - Urine (collected 18 Nov 2024 16:46)  Source: Catheterized Catheterized  Preliminary Report (19 Nov 2024 18:51):    >100,000 CFU/ml Enterococcus faecium    Culture - Blood (collected 18 Nov 2024 11:47)  Source: .Blood BLOOD  Preliminary Report (19 Nov 2024 16:01):    No growth at 24 hours    Culture - Sputum (collected 18 Nov 2024 11:19)  Source: .Sputum Sputum  Gram Stain (18 Nov 2024 19:26):    Moderate polymorphonuclear leukocytes per low power field    No Squamous epithelial cells per low power field    Numerous Gram Negative Rods per oil power field    Numerous Gram Positive Rods per oil power field  Preliminary Report (20 Nov 2024 10:39):    Mixed gram negative rods including Numerous Pseudomonas aeruginosa    Commensal vinay consistent with body site    Culture - Blood (collected 18 Nov 2024 10:50)  Source: .Blood BLOOD  Preliminary Report (19 Nov 2024 16:01):    No growth at 24 hours        A1C with Estimated Average Glucose Result: A1C with Estimated Average Glucose Result: 5.8 % (10-14-24 @ 05:08)  A1C with Estimated Average Glucose Result: 6.4 % (08-12-24 @ 07:32)         Patient seen today for follow up inpatient Diabetes Mellitus management.    Chief Complaint: Type 2 Diabetes Mellitus    INTERVAL HX:  Patient seen in [facility] RCU1 505 W1. Patient is lethargic, not following commands. Patient remains on 24hr TFs [employer] @40cc/hr, at goal now, tolerating but positive for diarrhea. BGs overnight hyperglycemic to 300s-419mg/dL -> TFs were held at 0000 to help with BGs <300mg/dL. Restarted around 0600. Dextrose 5% with sodium biarb 150mEq drip @125mg/dL stopped around 1200 today- will not be restarted per primary team as of now. Now tapered from Hydrocortisone 50mg q8h to 25mg q8h today. No hypoglycemia. BG this am 289mg/dL. Blood glucose levels in the last 24hrs have been 289-419mg/dL.     Review of Systems:  General: As above.  Respiratory: FARAZ d/t mental status; trached on ventilator.   Gastrointestinal: FARAZ d/t mental status; trached on ventilator.   Endocrine: FARAZ d/t mental status; trached on ventilator.     Allergies  walnut (Unknown)  metronidazole (Rash)  Lyrica (Unknown)  penicillin (Unknown)  Pineapple (Unknown)  Tagamet (Unknown)  heparin (Unknown)  Pecans (Unknown)  Hazelnut (Unknown)  meropenem (Rash)      Intolerances  None.       MEDICATIONS  (STANDING):  acetaminophen   Oral Liquid .. 650 milliGRAM(s) Oral every 6 hours PRN  albuterol/ipratropium for Nebulization 3 milliLiter(s) Nebulizer every 6 hours  aluminum hydroxide/magnesium hydroxide/simethicone Suspension 30 milliLiter(s) Enteral Tube every 4 hours PRN  artificial  tears Solution 1 Drop(s) Both EYES every 6 hours  ascorbic acid 500 milliGRAM(s) Oral daily  Biotene Dry Mouth Oral Rinse 5 milliLiter(s) Swish and Spit every 6 hours  calcium carbonate 1250 mG  + Vitamin D (OsCal 500 + D) 1 Tablet(s) Oral <User Schedule>  ceftolozane/tazobactam IVPB 3000 milliGRAM(s) IV Intermittent every 8 hours  chlorhexidine 0.12% Liquid 15 milliLiter(s) Oral Mucosa every 12 hours  chlorhexidine 4% Liquid 1 Application(s) Topical daily  dextrose 5% 1000 milliLiter(s) IV Continuous <Continuous>  dextrose 5%. 1000 milliLiter(s) IV Continuous <Continuous>  dextrose 5%. 1000 milliLiter(s) IV Continuous <Continuous>  dextrose 50% Injectable 25 Gram(s) IV Push once  dextrose 50% Injectable 12.5 Gram(s) IV Push once  diclofenac sodium 1% Gel 2 Gram(s) Topical every 6 hours  escitalopram 10 milliGRAM(s) Oral <User Schedule>  fentaNYL   Patch  12 MICROgram(s)/Hr 1 Patch Transdermal every 72 hours  fentaNYL   Patch  25 MICROgram(s)/Hr 1 Patch Transdermal every 72 hours  FIRST- Mouthwash  BLM 5 milliLiter(s) Swish and Spit every 8 hours  gabapentin Solution 250 milliGRAM(s) Oral <User Schedule>  glucagon  Injectable 1 milliGRAM(s) IntraMuscular once  hemorrhoidal Ointment 1 Application(s) Rectal at bedtime  hydrocortisone sodium succinate Injectable 25 milliGRAM(s) IV Push every 8 hours  influenza  Vaccine (HIGH DOSE) 0.5 milliLiter(s) IntraMuscular once  insulin glargine Injectable (LANTUS) 12 Unit(s) SubCutaneous <User Schedule>  insulin lispro (ADMELOG) corrective regimen sliding scale   SubCutaneous every 6 hours  insulin lispro Injectable (ADMELOG) 10 Unit(s) SubCutaneous every 6 hours  lactobacillus acidophilus 1 Tablet(s) Oral <User Schedule>  levothyroxine 125 MICROGram(s) Oral <User Schedule>  lidocaine   4% Patch 4 Patch Transdermal every 24 hours  nystatin Powder 1 Application(s) Topical every 8 hours  pantoprazole  Injectable 40 milliGRAM(s) IV Push every 12 hours  sodium chloride 1 Gram(s) Oral every 8 hours  sodium chloride 0.65% Nasal 1 Spray(s) Both Nostrils every 6 hours  triamcinolone 0.1% Oral Paste 1 Application(s) Topical every 8 hours  vancomycin    Solution 125 milliGRAM(s) Oral every 6 hours  witch hazel Pads 1 Application(s) Topical every 12 hours      dextrose 50% Injectable 25 Gram(s) IV Push once  dextrose 50% Injectable 12.5 Gram(s) IV Push once  glucagon  Injectable 1 milliGRAM(s) IntraMuscular once  hydrocortisone sodium succinate Injectable 25 milliGRAM(s) IV Push every 8 hours  insulin glargine Injectable (LANTUS) 12 Unit(s) SubCutaneous <User Schedule>  insulin lispro (ADMELOG) corrective regimen sliding scale   SubCutaneous every 6 hours  insulin lispro Injectable (ADMELOG) 10 Unit(s) SubCutaneous every 6 hours  levothyroxine 125 MICROGram(s) Oral <User Schedule>      insulin lispro (ADMELOG) corrective regimen sliding scale   SubCutaneous every 6 hours  insulin lispro Injectable (ADMELOG) 10 Unit(s) SubCutaneous every 6 hours      PHYSICAL EXAM:  VITALS:   T(C): 36.4 (11-20-24 @ 09:58), Max: 36.4 (11-20-24 @ 09:58)  HR: 82 (11-20-24 @ 10:05) (67 - 90)  BP: 168/75 (11-20-24 @ 09:58) (156/69 - 168/75)  RR: 22 (11-20-24 @ 09:58) (20 - 28)  SpO2: 100% (11-20-24 @ 10:05) (100% - 100%)    GENERAL: In no acute distress  Respiratory: Respirations unlabored, remains trached on ventilator   Extremities: Warm and dry, +BLE/BUE edema  NEURO: Lethargic, unable to follow commands    LABS:  POCT Blood Glucose.: 289 mg/dL (11-20-24 @ 05:15)  POCT Blood Glucose.: 321 mg/dL (11-19-24 @ 23:35)  POCT Blood Glucose.: 366 mg/dL (11-19-24 @ 20:26)  POCT Blood Glucose.: 419 mg/dL (11-19-24 @ 17:44)  POCT Blood Glucose.: 323 mg/dL (11-19-24 @ 11:34)  POCT Blood Glucose.: 218 mg/dL (11-19-24 @ 06:05)  POCT Blood Glucose.: 269 mg/dL (11-19-24 @ 00:51)  POCT Blood Glucose.: 203 mg/dL (11-18-24 @ 17:37)  POCT Blood Glucose.: 128 mg/dL (11-18-24 @ 11:58)  POCT Blood Glucose.: 91 mg/dL (11-18-24 @ 05:28)  POCT Blood Glucose.: 196 mg/dL (11-17-24 @ 23:25)  POCT Blood Glucose.: 231 mg/dL (11-17-24 @ 18:07)  POCT Blood Glucose.: 146 mg/dL (11-17-24 @ 12:17)                          7.2    17.83 )-----------( 70       ( 20 Nov 2024 06:46 )             23.2     11-20    131[L]  |  93[L]  |  46[H]  ----------------------------<  302[H]  4.3   |  18[L]  |  0.87    Ca    8.4      20 Nov 2024 06:38  Phos  2.9     11-20  Mg     1.8     11-20    TPro  5.6[L]  /  Alb  2.1[L]  /  TBili  1.3[H]  /  DBili  x   /  AST  47[H]  /  ALT  28  /  AlkPhos  124[H]  11-20    LIVER FUNCTIONS - ( 20 Nov 2024 06:38 )  Alb: 2.1 g/dL / Pro: 5.6 g/dL / ALK PHOS: 124 U/L / ALT: 28 U/L / AST: 47 U/L / GGT: x                 Urinalysis Basic - ( 20 Nov 2024 06:38 )    Color: x / Appearance: x / SG: x / pH: x  Gluc: 302 mg/dL / Ketone: x  / Bili: x / Urobili: x   Blood: x / Protein: x / Nitrite: x   Leuk Esterase: x / RBC: x / WBC x   Sq Epi: x / Non Sq Epi: x / Bacteria: x        Culture - Urine (collected 18 Nov 2024 16:46)  Source: Catheterized Catheterized  Preliminary Report (19 Nov 2024 18:51):    >100,000 CFU/ml Enterococcus faecium    Culture - Blood (collected 18 Nov 2024 11:47)  Source: .Blood BLOOD  Preliminary Report (19 Nov 2024 16:01):    No growth at 24 hours    Culture - Sputum (collected 18 Nov 2024 11:19)  Source: .Sputum Sputum  Gram Stain (18 Nov 2024 19:26):    Moderate polymorphonuclear leukocytes per low power field    No Squamous epithelial cells per low power field    Numerous Gram Negative Rods per oil power field    Numerous Gram Positive Rods per oil power field  Preliminary Report (20 Nov 2024 10:39):    Mixed gram negative rods including Numerous Pseudomonas aeruginosa    Commensal vinay consistent with body site    Culture - Blood (collected 18 Nov 2024 10:50)  Source: .Blood BLOOD  Preliminary Report (19 Nov 2024 16:01):    No growth at 24 hours        A1C with Estimated Average Glucose Result: A1C with Estimated Average Glucose Result: 5.8 % (10-14-24 @ 05:08)  A1C with Estimated Average Glucose Result: 6.4 % (08-12-24 @ 07:32)

## 2024-11-20 NOTE — PROGRESS NOTE ADULT - PROBLEM SELECTOR PLAN 1
- Patient with Chronic Trach at baseline   - Patient presented to ED with Hypoxemia in setting of PNA   - ABG repeated today Acidosis has since resolved  - Will decrease RR to 20 and repeat AM VBG  - Mechanical vent: Volume ctrl  AC /CMV:  RR: 20 / PEEP: 5 fio2 40%  - Continue Duoneb and Chest PT  - Continue Chest PT / Suctioning PRN

## 2024-11-20 NOTE — PROGRESS NOTE ADULT - PROBLEM SELECTOR PLAN 12
- Patients Daughter updated at bedside by RCU Team today  - MOLST Updated 11/19: Remains DNR / + Intubate, No Pressors or transfer to ICU Setting   - Ongoing GOC discussions; Daughter continues to discuss options with family; currently cont medical management  - Palliative care continues to follow - Patients Daughter updated at bedside by RCU Team today  - MOLST Updated 11/19: Remains DNR / + Intubate, No Pressors / No transfer to ICU Setting   - Ongoing GOC discussions; Daughter continues to discuss options with family; currently cont medical management  - Palliative care continues to follow

## 2024-11-21 NOTE — PROVIDER CONTACT NOTE (OTHER) - SITUATION
BP 88/49  T98.3 SpO2 97
Unsure what access requested for tube feeding use
pt temp was 102.2
B/p177/770
T 101.3 /64
hypoxia of 80
pt fingerstick 420, repeat 424
temp 102.2 F rectal
Pt consistently tachycardic ranging from 115-118.
pt hypotenisve 102/40 and small emesis
pt lunchtime fs 371
/118, 
K level 3.3 stat dose of Lasix 20mg to be given
Pt hypotensive
pt BP 87/44
pt BP 79/41
pt blood sugar 77 and 77 on repeat, pt due for 20 units of insulin and sliding scale. pt had previous hypoglycemic event yesterday.
Pt has oral temp of 100.1.
patient diaphoretic, rapid shallow breathing, RR mid to high 20's. satting 90% on full vent support
pt . repeat V/S after IV tylenol T100.1  Bp 106/51
pt has a rectal temp of 101.2
pt tachypneic & appears to be in pain, given PRN PO tylenol with no relief.
pt's blood sugar is 366 now, feeding is on Hold as her sugar was 419 at 1800. Pt is on D5 with 150 meq soda bicarb at 125 cc/hr
Pt febrile
Pt BP 76/32 on manual BP

## 2024-11-21 NOTE — PROGRESS NOTE ADULT - ASSESSMENT
73 yo F w/h/o controlled T2DM (A1C 5.8%) on insulin at home. Also HTN, HLD, hypothyroidism, RA on Prednisone, Fibromyalgia, cerebral aneurysm s/p repair, chronic hypercapnic respiratory failure s/p trach (vent dependent) and chronic PEG 2022, recurrent C. diff infection (follows with Dr. 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 S/p antibiotics and s/p GJ tube exchanges on 10/14, s/p PEG replacement 10/21.     Endocrine consulted for Type 2 DM management while on tube feeding. Patient remains lethargic, on 24hr TFs now at goal rate. Positive diarrhea/ C diff. No hypoglycemia. . BGs q6h now mostly at goal rated of 100-200mg/dl. No longer on D5 IV fluids.   Endocrine will closely monitor BG and adjust insulin as needed for BG goal 100-180mg/dL inpatient.   Please inform Endocrine team for any changes in tube feeding and steroid!    Home DM medications: Lantus 35 units at HS, Humalog 26 units with # 1 feeding, 22 units with #2 tube feeding, 20 units with #3 tube feeding and  Humalog correction scale (  2 units for -122, 4 units for -250, 6 units for -300, 8units for -350, 10 units for -400, 12 units for -450)   while on [employer] formula 3 cans/day, slow bolus( run at 80 ml/hr total volume 960 ml/day> 1st can around 6:30-8:30, 2nd can around 3 pm, 3rd can around 8-9 pm) plus Banatrol 1/2 packet BID, was increasing to 1 packet BID, plus Kefir 10 oz/day ( divided in multiple times throughout the day).

## 2024-11-21 NOTE — PROGRESS NOTE ADULT - SUBJECTIVE AND OBJECTIVE BOX
Patient is a 74y old  Female who presents with a chief complaint of Patient admitted with Hypoxemia and episode of Bright red blood per rectum (20 Nov 2024 12:46)    HPI:  75 yo F PMH T2DM on insulin, HTN, HLD, hypothyroidism, RA on Prednisone, Fibromyalgia, cerebral aneurysm s/p repair, chronic hypercapnic respiratory failure s/p trach (vent dependent) and chronic PEG 2022, recurrent C. diff infection (follows with Dr. Newman), persistent GJ tube leaks now s/p GC fistula repair 8/12 BIBEMS with daughter for respiratory distress, hypoxia to high 80s.  Pt also noted to have rectal bleeding this past week requiring transfusion 5 days ago in ED as per daughter.  In ED, pt afebrile, fiO2 96-98% on 40% on ventilator.  Chest Xray w/ opacification of right lung concerning for possible PNA.  Admitted to [facility] for further management.      (07 Oct 2024 18:58)    Interval Events:    REVIEW OF SYSTEMS:  [ ] Positive  [ ] All other systems negative  [ ] Unable to assess ROS because ________    Vital Signs Last 24 Hrs  T(C): 36.2 (11-21-24 @ 04:58), Max: 36.4 (11-20-24 @ 09:58)  T(F): 97.2 (11-21-24 @ 04:58), Max: 97.5 (11-20-24 @ 09:58)  HR: 82 (11-21-24 @ 06:34) (58 - 88)  BP: 158/82 (11-21-24 @ 05:30) (115/80 - 168/75)  RR: 18 (11-21-24 @ 04:58) (18 - 22)  SpO2: 100% (11-21-24 @ 06:34) (94% - 100%)    PHYSICAL EXAM:  HEENT:   [ ]Tracheostomy:  [ ]Pupils equal  [ ]No oral lesions  [ ]Abnormal    SKIN  [ ] No Rash  [ ] Abnormal  [ ] pressure    CARDIAC  [ ]Regular  [ ]Abnormal    PULMONARY  [ ]Bilateral Clear Breath Sounds  [ ]Normal Excursion  [ ]Abnormal    GI  [ ]PEG      [ ] +BS		              [ ]Soft, nondistended, nontender	  [ ]Abnormal    MUSCULOSKELETAL                                   [ ]Bedbound                 [ ]Abnormal    [ ]Ambulatory/OOB to chair                           EXTREMITIES                                         [ ]Normal  [ ]Edema                           NEUROLOGIC  [ ] Normal, non focal  [ ] Focal findings:    PSYCHIATRIC  [ ]Alert and appropriate  [ ] Sedated	 [ ]Agitated    :  Mcbride: [ ] Yes, if yes: Date of Placement:                   [  ] No    LINES: Central Lines [ ] Yes, if yes: Date of Placement                                     [  ] No    HOSPITAL MEDICATIONS:  MEDICATIONS  (STANDING):  albuterol/ipratropium for Nebulization 3 milliLiter(s) Nebulizer every 6 hours  artificial  tears Solution 1 Drop(s) Both EYES every 6 hours  ascorbic acid 500 milliGRAM(s) Oral daily  Biotene Dry Mouth Oral Rinse 5 milliLiter(s) Swish and Spit every 6 hours  calcium carbonate 1250 mG  + Vitamin D (OsCal 500 + D) 1 Tablet(s) Oral <User Schedule>  ceftolozane/tazobactam IVPB 3000 milliGRAM(s) IV Intermittent every 8 hours  chlorhexidine 0.12% Liquid 15 milliLiter(s) Oral Mucosa every 12 hours  chlorhexidine 4% Liquid 1 Application(s) Topical daily  dextrose 5%. 1000 milliLiter(s) (100 mL/Hr) IV Continuous <Continuous>  dextrose 5%. 1000 milliLiter(s) (50 mL/Hr) IV Continuous <Continuous>  dextrose 50% Injectable 25 Gram(s) IV Push once  dextrose 50% Injectable 12.5 Gram(s) IV Push once  diclofenac sodium 1% Gel 2 Gram(s) Topical every 6 hours  escitalopram 10 milliGRAM(s) Oral <User Schedule>  fentaNYL   Patch  12 MICROgram(s)/Hr 1 Patch Transdermal every 72 hours  fentaNYL   Patch  25 MICROgram(s)/Hr 1 Patch Transdermal every 72 hours  FIRST- Mouthwash  BLM 5 milliLiter(s) Swish and Spit every 8 hours  gabapentin Solution 250 milliGRAM(s) Oral <User Schedule>  glucagon  Injectable 1 milliGRAM(s) IntraMuscular once  hemorrhoidal Ointment 1 Application(s) Rectal at bedtime  hydrocortisone sodium succinate Injectable 25 milliGRAM(s) IV Push every 8 hours  influenza  Vaccine (HIGH DOSE) 0.5 milliLiter(s) IntraMuscular once  insulin glargine Injectable (LANTUS) 10 Unit(s) SubCutaneous <User Schedule>  insulin lispro (ADMELOG) corrective regimen sliding scale   SubCutaneous every 6 hours  insulin lispro Injectable (ADMELOG) 5 Unit(s) SubCutaneous every 6 hours  lactobacillus acidophilus 1 Tablet(s) Oral <User Schedule>  levothyroxine 125 MICROGram(s) Oral <User Schedule>  lidocaine   4% Patch 4 Patch Transdermal every 24 hours  nystatin Powder 1 Application(s) Topical every 8 hours  pantoprazole  Injectable 40 milliGRAM(s) IV Push every 12 hours  sodium chloride 1 Gram(s) Oral every 8 hours  sodium chloride 0.65% Nasal 1 Spray(s) Both Nostrils every 6 hours  triamcinolone 0.1% Oral Paste 1 Application(s) Topical every 8 hours  witch hazel Pads 1 Application(s) Topical every 12 hours    MEDICATIONS  (PRN):  acetaminophen   Oral Liquid .. 650 milliGRAM(s) Oral every 6 hours PRN Temp greater or equal to 38C (100.4F), Mild Pain (1 - 3)  aluminum hydroxide/magnesium hydroxide/simethicone Suspension 30 milliLiter(s) Enteral Tube every 4 hours PRN Dyspepsia      LABS:                        7.2    17.83 )-----------( 70       ( 20 Nov 2024 06:46 )             23.2     11-21    131[L]  |  93[L]  |  56[H]  ----------------------------<  190[H]  4.0   |  16[L]  |  0.87    Ca    8.3[L]      21 Nov 2024 07:09  Phos  4.7     11-21  Mg     1.9     11-21    TPro  6.0  /  Alb  2.1[L]  /  TBili  1.0  /  DBili  x   /  AST  50[H]  /  ALT  27  /  AlkPhos  162[H]  11-21      Venous Blood Gas:  11-20 @ 11:00  7.54/20/162/17/98.8  VBG Lactate: --      Mode: AC/ CMV (Assist Control/ Continuous Mandatory Ventilation)  RR (machine): 20  TV (machine): 450  FiO2: 40  PEEP: 5  ITime: 1  MAP: 17  PIP: 46   Patient is a 74y old  Female who presents with a chief complaint of Patient admitted with Hypoxemia and episode of Bright red blood per rectum (20 Nov 2024 12:46)    HPI:  73 yo F PMH T2DM on insulin, HTN, HLD, hypothyroidism, RA on Prednisone, Fibromyalgia, cerebral aneurysm s/p repair, chronic hypercapnic respiratory failure s/p trach (vent dependent) and chronic PEG 2022, recurrent C. diff infection (follows with Dr. Newman), persistent GJ tube leaks now s/p GC fistula repair 8/12 BIBEMS with daughter for respiratory distress, hypoxia to high 80s.  Pt also noted to have rectal bleeding this past week requiring transfusion 5 days ago in ED as per daughter.  In ED, pt afebrile, fiO2 96-98% on 40% on ventilator.  Chest Xray w/ opacification of right lung concerning for possible PNA.  Admitted to [facility] for further management. (07 Oct 2024 18:58)    Interval Events: no acute issues overnight    REVIEW OF SYSTEMS:  [ ] Positive  [x] All other systems negative; patient responds to yes/no questions  [ ] Unable to assess ROS because ________    Vital Signs Last 24 Hrs  T(C): 36.2 (11-21-24 @ 04:58), Max: 36.4 (11-20-24 @ 09:58)  T(F): 97.2 (11-21-24 @ 04:58), Max: 97.5 (11-20-24 @ 09:58)  HR: 82 (11-21-24 @ 06:34) (58 - 88)  BP: 158/82 (11-21-24 @ 05:30) (115/80 - 168/75)  RR: 18 (11-21-24 @ 04:58) (18 - 22)  SpO2: 100% (11-21-24 @ 06:34) (94% - 100%)    PHYSICAL EXAM:  HEENT:   [X] Tracheostomy: #8 distal XLT cuffed Shiley   [X] PERRLA  [ ] No oral lesions  [ ] Abnormal    SKIN  [ ] No Rash  [ ] Abnormal  [X] pressure + Sacral Wound    CARDIAC  [X] Regular  [ ] Abnormal    PULMONARY  [ ] Bilateral Clear Breath Sounds  [ ] Normal Excursion  [X] Abnormal - Diffuses coarse breath sounds / decreased at bases bilaterally     GI  [X] PEG site covered w/ dry dressing              [X] Soft, nondistended, nontender	  [X] Abnormal; old PEG stoma site with small opening c/d/i,     MUSCULOSKELETAL                                   [X] Bedbound                 [ ] Abnormal    [ ] Ambulatory/OOB to chair                           EXTREMITIES                                         [ ] Normal  [X] Edema - Anasarca                        NEUROLOGIC  [ ] Normal, non focal  [X] Focal findings: Obtunded Minimally unresponsive to voice, grimacing occasionally, not following commands, no purposeful movements in all extremities, withdraws to noxious stimuli.    PSYCHIATRIC  [x ] somnolent  [] Sedated	 [ ]Agitated    :  Mcbride: [ ] Yes, if yes: Date of Placement:                   [X] No    LINES: Central Lines [ ] Yes, if yes: Date of Placement                                     [X] No    HOSPITAL MEDICATIONS:  MEDICATIONS  (STANDING):  albuterol/ipratropium for Nebulization 3 milliLiter(s) Nebulizer every 6 hours  artificial  tears Solution 1 Drop(s) Both EYES every 6 hours  ascorbic acid 500 milliGRAM(s) Oral daily  Biotene Dry Mouth Oral Rinse 5 milliLiter(s) Swish and Spit every 6 hours  calcium carbonate 1250 mG  + Vitamin D (OsCal 500 + D) 1 Tablet(s) Oral <User Schedule>  ceftolozane/tazobactam IVPB 3000 milliGRAM(s) IV Intermittent every 8 hours  chlorhexidine 0.12% Liquid 15 milliLiter(s) Oral Mucosa every 12 hours  chlorhexidine 4% Liquid 1 Application(s) Topical daily  dextrose 5%. 1000 milliLiter(s) (100 mL/Hr) IV Continuous <Continuous>  dextrose 5%. 1000 milliLiter(s) (50 mL/Hr) IV Continuous <Continuous>  dextrose 50% Injectable 25 Gram(s) IV Push once  dextrose 50% Injectable 12.5 Gram(s) IV Push once  diclofenac sodium 1% Gel 2 Gram(s) Topical every 6 hours  escitalopram 10 milliGRAM(s) Oral <User Schedule>  fentaNYL   Patch  12 MICROgram(s)/Hr 1 Patch Transdermal every 72 hours  fentaNYL   Patch  25 MICROgram(s)/Hr 1 Patch Transdermal every 72 hours  FIRST- Mouthwash  BLM 5 milliLiter(s) Swish and Spit every 8 hours  gabapentin Solution 250 milliGRAM(s) Oral <User Schedule>  glucagon  Injectable 1 milliGRAM(s) IntraMuscular once  hemorrhoidal Ointment 1 Application(s) Rectal at bedtime  hydrocortisone sodium succinate Injectable 25 milliGRAM(s) IV Push every 8 hours  influenza  Vaccine (HIGH DOSE) 0.5 milliLiter(s) IntraMuscular once  insulin glargine Injectable (LANTUS) 10 Unit(s) SubCutaneous <User Schedule>  insulin lispro (ADMELOG) corrective regimen sliding scale   SubCutaneous every 6 hours  insulin lispro Injectable (ADMELOG) 5 Unit(s) SubCutaneous every 6 hours  lactobacillus acidophilus 1 Tablet(s) Oral <User Schedule>  levothyroxine 125 MICROGram(s) Oral <User Schedule>  lidocaine   4% Patch 4 Patch Transdermal every 24 hours  nystatin Powder 1 Application(s) Topical every 8 hours  pantoprazole  Injectable 40 milliGRAM(s) IV Push every 12 hours  sodium chloride 1 Gram(s) Oral every 8 hours  sodium chloride 0.65% Nasal 1 Spray(s) Both Nostrils every 6 hours  triamcinolone 0.1% Oral Paste 1 Application(s) Topical every 8 hours  witch hazel Pads 1 Application(s) Topical every 12 hours    MEDICATIONS  (PRN):  acetaminophen   Oral Liquid .. 650 milliGRAM(s) Oral every 6 hours PRN Temp greater or equal to 38C (100.4F), Mild Pain (1 - 3)  aluminum hydroxide/magnesium hydroxide/simethicone Suspension 30 milliLiter(s) Enteral Tube every 4 hours PRN Dyspepsia      LABS:                        7.2    17.83 )-----------( 70       ( 20 Nov 2024 06:46 )             23.2     11-21    131[L]  |  93[L]  |  56[H]  ----------------------------<  190[H]  4.0   |  16[L]  |  0.87    Ca    8.3[L]      21 Nov 2024 07:09  Phos  4.7     11-21  Mg     1.9     11-21    TPro  6.0  /  Alb  2.1[L]  /  TBili  1.0  /  DBili  x   /  AST  50[H]  /  ALT  27  /  AlkPhos  162[H]  11-21      Venous Blood Gas:  11-20 @ 11:00  7.54/20/162/17/98.8  VBG Lactate: --      Mode: AC/ CMV (Assist Control/ Continuous Mandatory Ventilation)  RR (machine): 20  TV (machine): 450  FiO2: 40  PEEP: 5  ITime: 1  MAP: 17  PIP: 46

## 2024-11-21 NOTE — PROVIDER CONTACT NOTE (OTHER) - RECOMMENDATIONS
repeat FS in 2 hrs
PA not concern at this time
Assess orders and peg
Hydralazine 5mg IVP x1
monitor and recheck BP in 2hrs. Holds feeds
repeat K level
cold compress applied
fluids bolus and repeat BP
NP Made aware, does NP want a cxr, blood gas?
NP made aware

## 2024-11-21 NOTE — PROGRESS NOTE ADULT - NUTRITIONAL ASSESSMENT
Diet, NPO with Tube Feed:   Tube Feeding Modality: Jejunostomy  [employer] View Peptide 1.5 (KFPEPT1.5RTH)  Total Volume for 24 Hours (mL): 960  Continuous  Until Goal Tube Feed Rate (mL per Hour): 40  Tube Feed Duration (in Hours): 24  Tube Feed Start Time: 06:00  Free Water Flush  Pump   Rate (mL per Hour): 50   Frequency: Every Hour  Free Water Flush Instructions:  50ml free water flushes Q1hr  Alfred(7 Gm Arginine/7 Gm Glut/1.2 Gm HMB     Qty per Day:  2  No Carb Prosource (1pkg = 15gms Protein)     Qty per Day:  1  Banatrol TF     Qty per Day:  1/2 packet per daily (11-19-24 @ 10:25) [Active]

## 2024-11-21 NOTE — PROGRESS NOTE ADULT - SUBJECTIVE AND OBJECTIVE BOX
Chief Complaint: Diabetes Mellitus follow up    INTERVAL HX:  Patient seen at bedside with her  present.   Continues on tube feeding at 40 cc/hr over 24hrs.   BG over the last 24 hrs have been within goal range 100-180mg/dl. No hypoglycemia.     Review of Systems:  General: As above  GI: No nausea, vomiting  Endocrine: no  S&Sx of hypoglycemia    Allergies    walnut (Unknown)  metronidazole (Rash)  Lyrica (Unknown)  penicillin (Unknown)  Pineapple (Unknown)  Tagamet (Unknown)  heparin (Unknown)  Pecans (Unknown)  Hazelnut (Unknown)  meropenem (Rash)    Intolerances      MEDICATIONS  (STANDING):  albuterol/ipratropium for Nebulization 3 milliLiter(s) Nebulizer every 6 hours  artificial  tears Solution 1 Drop(s) Both EYES every 6 hours  ascorbic acid 500 milliGRAM(s) Oral daily  Biotene Dry Mouth Oral Rinse 5 milliLiter(s) Swish and Spit every 6 hours  calcium carbonate 1250 mG  + Vitamin D (OsCal 500 + D) 1 Tablet(s) Oral <User Schedule>  ceftolozane/tazobactam IVPB 3000 milliGRAM(s) IV Intermittent every 8 hours  chlorhexidine 0.12% Liquid 15 milliLiter(s) Oral Mucosa every 12 hours  chlorhexidine 4% Liquid 1 Application(s) Topical daily  dextrose 5%. 1000 milliLiter(s) (100 mL/Hr) IV Continuous <Continuous>  dextrose 5%. 1000 milliLiter(s) (50 mL/Hr) IV Continuous <Continuous>  dextrose 50% Injectable 25 Gram(s) IV Push once  dextrose 50% Injectable 12.5 Gram(s) IV Push once  diclofenac sodium 1% Gel 2 Gram(s) Topical every 6 hours  escitalopram 10 milliGRAM(s) Oral <User Schedule>  fentaNYL   Patch  12 MICROgram(s)/Hr 1 Patch Transdermal every 72 hours  fentaNYL   Patch  25 MICROgram(s)/Hr 1 Patch Transdermal every 72 hours  FIRST- Mouthwash  BLM 5 milliLiter(s) Swish and Spit every 8 hours  gabapentin Solution 250 milliGRAM(s) Oral <User Schedule>  glucagon  Injectable 1 milliGRAM(s) IntraMuscular once  hemorrhoidal Ointment 1 Application(s) Rectal at bedtime  hydrocortisone sodium succinate Injectable 25 milliGRAM(s) IV Push every 8 hours  influenza  Vaccine (HIGH DOSE) 0.5 milliLiter(s) IntraMuscular once  insulin glargine Injectable (LANTUS) 10 Unit(s) SubCutaneous <User Schedule>  insulin lispro (ADMELOG) corrective regimen sliding scale   SubCutaneous every 6 hours  insulin lispro Injectable (ADMELOG) 5 Unit(s) SubCutaneous every 6 hours  lactobacillus acidophilus 1 Tablet(s) Oral <User Schedule>  levothyroxine 125 MICROGram(s) Oral <User Schedule>  lidocaine   4% Patch 4 Patch Transdermal every 24 hours  nystatin Powder 1 Application(s) Topical every 8 hours  pantoprazole  Injectable 40 milliGRAM(s) IV Push every 12 hours  sodium chloride 1 Gram(s) Oral every 8 hours  sodium chloride 0.65% Nasal 1 Spray(s) Both Nostrils every 6 hours  triamcinolone 0.1% Oral Paste 1 Application(s) Topical every 8 hours  witch hazel Pads 1 Application(s) Topical every 12 hours        hydrocortisone sodium succinate Injectable   25 milliGRAM(s) IV Push (11-21-24 @ 13:24)   25 milliGRAM(s) IV Push (11-21-24 @ 05:15)   25 milliGRAM(s) IV Push (11-20-24 @ 22:25)   25 milliGRAM(s) IV Push (11-20-24 @ 13:59)    insulin glargine Injectable (LANTUS)   10 Unit(s) SubCutaneous (11-21-24 @ 05:51)    insulin lispro (ADMELOG) corrective regimen sliding scale   4 Unit(s) SubCutaneous (11-21-24 @ 12:32)   2 Unit(s) SubCutaneous (11-21-24 @ 05:52)   2 Unit(s) SubCutaneous (11-21-24 @ 02:41)   2 Unit(s) SubCutaneous (11-20-24 @ 17:44)    insulin lispro Injectable (ADMELOG)   5 Unit(s) SubCutaneous (11-21-24 @ 12:32)   5 Unit(s) SubCutaneous (11-21-24 @ 05:52)   5 Unit(s) SubCutaneous (11-21-24 @ 02:41)   5 Unit(s) SubCutaneous (11-20-24 @ 17:44)    levothyroxine   125 MICROGram(s) Oral (11-21-24 @ 04:14)        PHYSICAL EXAM:  VITALS: T(C): 36.3 (11-21-24 @ 11:52)  T(F): 97.4 (11-21-24 @ 11:52), Max: 97.4 (11-21-24 @ 11:52)  HR: 114 (11-21-24 @ 13:45) (58 - 124)  BP: 145/66 (11-21-24 @ 13:45) (115/80 - 238/118)  RR:  (18 - 22)  SpO2:  (91% - 116%)  Wt(kg): --  GENERAL: NAD  Respiratory: Respirations unlabored -trached and vented.   Extremities: Warm, no edema  NEURO: Alert , appropriate     LABS:  POCT Blood Glucose.: 201 mg/dL (11-21-24 @ 12:09)  POCT Blood Glucose.: 200 mg/dL (11-21-24 @ 05:50)  POCT Blood Glucose.: 175 mg/dL (11-21-24 @ 02:26)  POCT Blood Glucose.: 186 mg/dL (11-20-24 @ 23:49)  POCT Blood Glucose.: 197 mg/dL (11-20-24 @ 17:14)  POCT Blood Glucose.: 280 mg/dL (11-20-24 @ 11:32)  POCT Blood Glucose.: 289 mg/dL (11-20-24 @ 05:15)  POCT Blood Glucose.: 321 mg/dL (11-19-24 @ 23:35)  POCT Blood Glucose.: 366 mg/dL (11-19-24 @ 20:26)  POCT Blood Glucose.: 419 mg/dL (11-19-24 @ 17:44)  POCT Blood Glucose.: 323 mg/dL (11-19-24 @ 11:34)  POCT Blood Glucose.: 218 mg/dL (11-19-24 @ 06:05)  POCT Blood Glucose.: 269 mg/dL (11-19-24 @ 00:51)  POCT Blood Glucose.: 203 mg/dL (11-18-24 @ 17:37)                          7.5    15.39 )-----------( 65       ( 21 Nov 2024 10:32 )             24.5     11-21    131[L]  |  93[L]  |  56[H]  ----------------------------<  190[H]  4.0   |  16[L]  |  0.87    Ca    8.3[L]      21 Nov 2024 07:09  Phos  4.7     11-21  Mg     1.9     11-21    TPro  6.0  /  Alb  2.1[L]  /  TBili  1.0  /  DBili  x   /  AST  50[H]  /  ALT  27  /  AlkPhos  162[H]  11-21    eGFR: 71 mL/min/1.73m2 (21 Nov 2024 07:09)      Thyroid Function Tests:      A1C with Estimated Average Glucose Result: 5.8 % (10-14-24 @ 05:08)    Estimated Average Glucose: 120 mg/dL (10-14-24 @ 05:08)        Diet, NPO with Tube Feed:   Tube Feeding Modality: Jejunostomy  [place] Peptide 1.5 (KFPEPT1.5RTH)  Total Volume for 24 Hours (mL): 960  Continuous  Until Goal Tube Feed Rate (mL per Hour): 40  Tube Feed Duration (in Hours): 24  Tube Feed Start Time: 06:00  Free Water Flush  Pump   Rate (mL per Hour): 50   Frequency: Every Hour  Free Water Flush Instructions:  50ml free water flushes Q1hr  Alfred(7 Gm Arginine/7 Gm Glut/1.2 Gm HMB     Qty per Day:  2  No Carb Prosource (1pkg = 15gms Protein)     Qty per Day:  1  Banatrol TF     Qty per Day:  1/2 packet per daily (11-19-24 @ 10:25) [Active]

## 2024-11-21 NOTE — PROGRESS NOTE ADULT - ASSESSMENT
75 yo F PMH T2DM on insulin, HTN, HLD, hypothyroidism, RA on Prednisone, Fibromyalgia, cerebral aneurysm s/p repair, chronic hypercapnic respiratory failure s/p trach (vent dependent) and chronic PEG 2022, recurrent C. diff infection (follows with Dr. Newman), persistent GJ tube leaks now s/p GC fistula repair 8/12 BIBEMS on 10/7 with daughter for respiratory distress, hypoxia to 88%.  Pt also noted to have rectal bleeding week prior to presentation requiring transfusion 5 days ago in ED as per daughter. Daughter also reports brownish discharge from G-J tube. In ED, pt afebrile, fiO2 96-98% on 40% on ventilator.  Chest X-ray w/ opacification of right lung concerning for possible PNA.  Admitted to RCU for further management. Sputum cxs grew PSA; treated with course of Cefepime. Patient with decreased H+H received 1 unit of PRBCS on 10/10.  10/14 EGD w/ Dr. Rosales for PEGJ exchange and clippings placed for closure of fistula site.  Persistent fevers treated with meropenem and vanc (d/c'd 10/20).  CT A/P without infectious source.  Continued fevers and persistent leukocytosis 11/1 CDiff positive - po vanco started, IV flagyl added (11/3-11/8). Head CT performed on 11/6 due to concern of lethargy no acute intracranial findings were noted; likely related to metabolic encephalopathy. Patient continued to have persistent fevers and was empirically treated with Cefepime (11/5-11/9) for CR PSA in sputum cx. CT C/A/P performed 11/6 c/w consolidative opacity LLL. Evening of 11/10-11/11, WBC 15 --> 21, procal 1 --> 43, lactate 3.2 -- pt also febrile. Infectious w/u sent 11/10 -- BCx negative, trach aspirate cx pending, UA negative. CT CAP on 11/11 showed left greater than right basilar consolidation and patchy ground glass opacity in both lungs consistent with atelectasis and/or pneumonia; Feeding tube coils in the stomach with tip within the first portion of the duodenum, No bowel obstruction. Cefepime restarted 11/11-11/15 with improvement in leukocytosis. Patient underwent Endoscopic adjustment of peg 11/13 with Dr. Rosales. XR tube check performed. Patient became Septic again on 11/19 requiring Midodrine, Solucortef and HC03 drip; Patient was empirically placed on Zerbaxa.     11/20: No events reported overnight. Ongoing GOC Discussions; MOLST Updated yesterday evening to state No pressors and No transfer to ICU if indicated. Repeat ABG performed this morning acidosis has resolved will DC HC03 drip and decrease RR to 20 with repeat VBG in AM. Patient without hypotension will dc Midodrine and lower Solu-cortef to 25 mg q 8hrs. Patients cxs from 11/18; Blood cx: NGTD, Sputum cx: CRE PSA( sensitive to Cef/sloan) and Urine cx growing E.facelis ( likely colonized as UA Negative for WBC, Nitrates and only trace Leuks). Case d/w ID will continue Cef/ Sloan in the meantime.  75 yo F PMH T2DM on insulin, HTN, HLD, hypothyroidism, RA on Prednisone, Fibromyalgia, cerebral aneurysm s/p repair, chronic hypercapnic respiratory failure s/p trach (vent dependent) and chronic PEG 2022, recurrent C. diff infection (follows with Dr. Newman), persistent GJ tube leaks now s/p GC fistula repair 8/12 BIBEMS on 10/7 with daughter for respiratory distress, hypoxia to 88%.  Pt also noted to have rectal bleeding week prior to presentation requiring transfusion 5 days ago in ED as per daughter. Daughter also reports brownish discharge from G-J tube. In ED, pt afebrile, fiO2 96-98% on 40% on ventilator.  Chest X-ray w/ opacification of right lung concerning for possible PNA.  Admitted to RCU for further management. Sputum cxs grew PSA; treated with course of Cefepime. Patient with decreased H+H received 1 unit of PRBCS on 10/10.  10/14 EGD w/ Dr. Rosales for PEGJ exchange and clippings placed for closure of fistula site.  Persistent fevers treated with meropenem and vanc (d/c'd 10/20).  CT A/P without infectious source.  Continued fevers and persistent leukocytosis 11/1 CDiff positive - po vanco started, IV flagyl added (11/3-11/8). Head CT performed on 11/6 due to concern of lethargy no acute intracranial findings were noted; likely related to metabolic encephalopathy. Patient continued to have persistent fevers and was empirically treated with Cefepime (11/5-11/9) for CR PSA in sputum cx. CT C/A/P performed 11/6 c/w consolidative opacity LLL. Evening of 11/10-11/11, WBC 15 --> 21, procal 1 --> 43, lactate 3.2 -- pt also febrile. Infectious w/u sent 11/10 -- BCx negative, trach aspirate cx pending, UA negative. CT CAP on 11/11 showed left greater than right basilar consolidation and patchy ground glass opacity in both lungs consistent with atelectasis and/or pneumonia; Feeding tube coils in the stomach with tip within the first portion of the duodenum, No bowel obstruction. Cefepime restarted 11/11-11/15 with improvement in leukocytosis. Patient underwent Endoscopic adjustment of peg 11/13 with Dr. Rosales. XR tube check performed. Patient became Septic again on 11/19 requiring Midodrine, Solucortef and HC03 drip; Patient was empirically placed on Zerbaxa.     11/21: No events reported overnight. Ongoing GOC Discussions; MOLST Updated yesterday evening to state No pressors and No transfer to ICU if indicated. Improved lactate, procalcitonin and awareness.  Answering yes/No question barely.  Patients cxs from 11/18; Blood cx: NGTD, Sputum cx: CRE PSA( sensitive to Cef/sloan) and Urine cx growing E.facelis ( likely colonized as UA Negative for WBC, Nitrates and only trace Leuks). Case d/w ID will continue Cef/ Sloan in the meantime. Became hypertensive, O2 sat's dropped to 90%, likely flashed, gave hydralazine, albumin and lasix. 73 yo F PMH T2DM on insulin, HTN, HLD, hypothyroidism, RA on Prednisone, Fibromyalgia, cerebral aneurysm s/p repair, chronic hypercapnic respiratory failure s/p trach (vent dependent) and chronic PEG 2022, recurrent C. diff infection (follows with Dr. Newman), persistent GJ tube leaks now s/p GC fistula repair 8/12 BIBEMS on 10/7 with daughter for respiratory distress, hypoxia to 88%.  Pt also noted to have rectal bleeding week prior to presentation requiring transfusion 5 days ago in ED as per daughter. Daughter also reports brownish discharge from G-J tube. In ED, pt afebrile, fiO2 96-98% on 40% on ventilator.  Chest X-ray w/ opacification of right lung concerning for possible PNA.  Admitted to RCU for further management. Sputum cxs grew PSA; treated with course of Cefepime. Patient with decreased H+H received 1 unit of PRBCS on 10/10.  10/14 EGD w/ Dr. Rosales for PEGJ exchange and clippings placed for closure of fistula site.  Persistent fevers treated with meropenem and vanc (d/c'd 10/20).  CT A/P without infectious source.  Continued fevers and persistent leukocytosis 11/1 CDiff positive - po vanco started, IV flagyl added (11/3-11/8). Head CT performed on 11/6 due to concern of lethargy no acute intracranial findings were noted; likely related to metabolic encephalopathy. Patient continued to have persistent fevers and was empirically treated with Cefepime (11/5-11/9) for CR PSA in sputum cx. CT C/A/P performed 11/6 c/w consolidative opacity LLL. Evening of 11/10-11/11, WBC 15 --> 21, procal 1 --> 43, lactate 3.2 -- pt also febrile. Infectious w/u sent 11/10 -- BCx negative, trach aspirate cx pending, UA negative. CT CAP on 11/11 showed left greater than right basilar consolidation and patchy ground glass opacity in both lungs consistent with atelectasis and/or pneumonia; Feeding tube coils in the stomach with tip within the first portion of the duodenum, No bowel obstruction. Cefepime restarted 11/11-11/15 with improvement in leukocytosis. Patient underwent Endoscopic adjustment of peg 11/13 with Dr. Rosales. XR tube check performed. Patient became Septic again on 11/19 requiring Midodrine, Solucortef and HC03 drip; Patient was empirically placed on Zerbaxa.     11/21: No events reported overnight. Ongoing GOC Discussions; MOLST Updated yesterday evening to state No pressors and No transfer to ICU if indicated. Improved lactate, procalcitonin and awareness.  Answering yes/No question barely.  Patients cxs from 11/18; Blood cx: NGTD, Sputum cx: CRE PSA( sensitive to Cef/sloan) and Urine cx growing E.facelis ( likely colonized as UA Negative for WBC, Nitrates and only trace Leuks). Case d/w ID will continue Cef/ Sloan in the meantime. Became hypertensive, O2 sat's dropped to 90%, likely flashed, gave hydralazine, albumin and Lasix.

## 2024-11-21 NOTE — PROGRESS NOTE ADULT - NS ATTEND AMEND GEN_ALL_CORE FT
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 Found to have Pseudomonas aeruginosa in sputum culture and urine culture with ESBL E. coli s/p course of meropenem. Course complicated by antibiotic-associated diarrhea.             - today was more interactive opening eyes and occ shaking head yes/no, cont minimize any sedatives   - ventilation stable, decresae MV to baseline settings  - cont Duonebs + 3% q6h for ACT  - cont PO Vanc for Cdiff  - improved sepsis w/ Zerbaxa as sputum cx now w/ more resistant PsA, f/u ID reccs    - dental eval performed to eval oral source for infection and felt to not be the source of sepsis  - cont monitoring blood gas and clinically  - BP has been labile now off midodrine  - will diurese today given significant edema  - monitor i/o and now off bicarb gtt given ph   - Anemia multifactorial, hemorrhoids, AOCD. s/p EGD without active bleeding, transfuse for Hb < 7  - cont to monitor FS, cont tapering stress dose steroids  (for RA on home prednisone 5 mg daily )  - cont wound care to sacrum for decub    - DVT ppx- only scd for now given significant anemia a few days ago    Prognosis and GOC d/w daughter at bedside

## 2024-11-21 NOTE — PROGRESS NOTE ADULT - SUBJECTIVE AND OBJECTIVE BOX
Chief Complaint:  FU anemia    History of Present Illness:  chart reviewed, d/w RCU; with pulmonary edema to be diuresed; d/w RN- minimal blood from ET, no other oozing at lines, no melena, no hematuria; ROS not able to be obtained      MEDICATIONS  (STANDING):  albuterol/ipratropium for Nebulization 3 milliLiter(s) Nebulizer every 6 hours  artificial  tears Solution 1 Drop(s) Both EYES every 6 hours  ascorbic acid 500 milliGRAM(s) Oral daily  Biotene Dry Mouth Oral Rinse 5 milliLiter(s) Swish and Spit every 6 hours  calcium carbonate 1250 mG  + Vitamin D (OsCal 500 + D) 1 Tablet(s) Oral <User Schedule>  ceftolozane/tazobactam IVPB 3000 milliGRAM(s) IV Intermittent every 8 hours  chlorhexidine 0.12% Liquid 15 milliLiter(s) Oral Mucosa every 12 hours  chlorhexidine 4% Liquid 1 Application(s) Topical daily  dextrose 5%. 1000 milliLiter(s) (100 mL/Hr) IV Continuous <Continuous>  dextrose 5%. 1000 milliLiter(s) (50 mL/Hr) IV Continuous <Continuous>  dextrose 50% Injectable 25 Gram(s) IV Push once  dextrose 50% Injectable 12.5 Gram(s) IV Push once  diclofenac sodium 1% Gel 2 Gram(s) Topical every 6 hours  escitalopram 10 milliGRAM(s) Oral <User Schedule>  fentaNYL   Patch  12 MICROgram(s)/Hr 1 Patch Transdermal every 72 hours  fentaNYL   Patch  25 MICROgram(s)/Hr 1 Patch Transdermal every 72 hours  FIRST- Mouthwash  BLM 5 milliLiter(s) Swish and Spit every 8 hours  gabapentin Solution 250 milliGRAM(s) Oral <User Schedule>  glucagon  Injectable 1 milliGRAM(s) IntraMuscular once  hemorrhoidal Ointment 1 Application(s) Rectal at bedtime  hydrocortisone sodium succinate Injectable 25 milliGRAM(s) IV Push every 8 hours  influenza  Vaccine (HIGH DOSE) 0.5 milliLiter(s) IntraMuscular once  insulin glargine Injectable (LANTUS) 10 Unit(s) SubCutaneous <User Schedule>  insulin lispro (ADMELOG) corrective regimen sliding scale   SubCutaneous every 6 hours  insulin lispro Injectable (ADMELOG) 5 Unit(s) SubCutaneous every 6 hours  lactobacillus acidophilus 1 Tablet(s) Oral <User Schedule>  levothyroxine 125 MICROGram(s) Oral <User Schedule>  lidocaine   4% Patch 4 Patch Transdermal every 24 hours  nystatin Powder 1 Application(s) Topical every 8 hours  pantoprazole  Injectable 40 milliGRAM(s) IV Push every 12 hours  sodium chloride 1 Gram(s) Oral every 8 hours  sodium chloride 0.65% Nasal 1 Spray(s) Both Nostrils every 6 hours  triamcinolone 0.1% Oral Paste 1 Application(s) Topical every 8 hours  witch hazel Pads 1 Application(s) Topical every 12 hours    MEDICATIONS  (PRN):  acetaminophen   Oral Liquid .. 650 milliGRAM(s) Oral every 6 hours PRN Temp greater or equal to 38C (100.4F), Mild Pain (1 - 3)  aluminum hydroxide/magnesium hydroxide/simethicone Suspension 30 milliLiter(s) Enteral Tube every 4 hours PRN Dyspepsia  oxyCODONE    Solution 5 milliGRAM(s) Oral every 6 hours PRN Moderate Pain (4 - 6)      Allergies    walnut (Unknown)  metronidazole (Rash)  Lyrica (Unknown)  penicillin (Unknown)  Pineapple (Unknown)  Tagamet (Unknown)  heparin (Unknown)  Pecans (Unknown)  Hazelnut (Unknown)  meropenem (Rash)    Intolerances        Vital Signs Last 24 Hrs  T(C): 36.3 (21 Nov 2024 11:52), Max: 36.3 (21 Nov 2024 11:52)  T(F): 97.4 (21 Nov 2024 11:52), Max: 97.4 (21 Nov 2024 11:52)  HR: 114 (21 Nov 2024 13:45) (58 - 124)  BP: 145/66 (21 Nov 2024 13:45) (115/80 - 238/118)  BP(mean): --  RR: 20 (21 Nov 2024 13:45) (18 - 20)  SpO2: 97% (21 Nov 2024 13:45) (91% - 116%)    Parameters below as of 21 Nov 2024 13:45  Patient On (Oxygen Delivery Method): ventilator    O2 Concentration (%): 40    PHYSICAL EXAM  General: adult in NAD  HEENT: trach  CV: normal S1/S2  Lungs: coarse BS  Abdomen: soft non-tender non-distended, positive bowel sounds  Ext: edema  Lymph Nodes: No LAD in neck  Neuro: lethargic    LABS:                          7.5    15.39 )-----------( 65       ( 21 Nov 2024 10:32 )             24.5         Mean Cell Volume : 86.9 fl  Mean Cell Hemoglobin : 26.6 pg  Mean Cell Hemoglobin Concentration : 30.6 g/dL  Auto Neutrophil # : x  Auto Lymphocyte # : x  Auto Monocyte # : x  Auto Eosinophil # : x  Auto Basophil # : x  Auto Neutrophil % : x  Auto Lymphocyte % : x  Auto Monocyte % : x  Auto Eosinophil % : x  Auto Basophil % : x      Serial CBC's  11-21 @ 10:32  Hct-24.5 / Hgb-7.5 / Plat-65 / RBC-2.82 / WBC-15.39  Serial CBC's  11-20 @ 06:46  Hct-23.2 / Hgb-7.2 / Plat-70 / RBC-2.74 / WBC-17.83  Serial CBC's  11-19 @ 07:12  Hct-25.2 / Hgb-7.4 / Plat-89 / RBC-2.71 / WBC-27.15  Serial CBC's  11-18 @ 07:12  Hct-27.3 / Hgb-7.6 / Plat-106 / RBC-2.88 / WBC-22.92      11-21    131[L]  |  93[L]  |  56[H]  ----------------------------<  190[H]  4.0   |  16[L]  |  0.87    Ca    8.3[L]      21 Nov 2024 07:09  Phos  4.7     11-21  Mg     1.9     11-21    TPro  6.0  /  Alb  2.1[L]  /  TBili  1.0  /  DBili  x   /  AST  50[H]  /  ALT  27  /  AlkPhos  162[H]  11-21                      Radiology:

## 2024-11-21 NOTE — PROGRESS NOTE ADULT - ASSESSMENT
75 yo F PMH T2DM on insulin, HTN, HLD, hypothyroidism, RA on Prednisone, Fibromyalgia, cerebral aneurysm s/p repair, chronic hypercapnic respiratory failure s/p trach (vent dependent) and chronic PEG 2022, recurrent C. diff infection (follows with Dr. Newman), persistent GJ tube leaks now s/p GC fistula repair 8/12 with hypoxia, PNA, with anemia    #anemia, AOCD + phlebotomy  - pt with multiple chronic issues causing AOCD with acute worsening in context of acute illness + phlebotomy  - was fecal occult + on admission but now no overt bleeding  - no renal insufficiency  - s/p PRBC 10/10, 10/30  - iron studies 10/30 with AOCD, no evidence of hemolysis  - WBC/plt stable; plt with some fluctuation, currently normal  - B12/folate normal  - discussed with daughter prior that unfortunately no simple treatment for anemia of chronic disease besides transfusions as needed  - s/p 1 unit PRBC 11/12  - hg lower in setting of lactic acidosis, acute illness- monitor    #thrombocytopenia-   - with fluctuating plt in past  - with continued trend down in setting of acute illness; no bleeding; pt not on heparin  - could check coags/fibrinogen to evaluate further, no clinical signs of DIC    d/w RCU

## 2024-11-21 NOTE — PROVIDER CONTACT NOTE (OTHER) - REASON
Mild hypotension + small emesis
pt in pain
pt temp 100.3
K level 3.3 stat dose of lasix 20mg to begiven
Tube feeding access
Pt febrile
pt 
FS 
Temp of 100.1
temp 102.2 F rectal
Hypotensive
pt BP 87/44
pt fingerstick 420, repeat 424
pt fs 413
pt hypotensive
pt temp 102.2
pt with elevated temp
/118, , patient satting 91% on 40% fi02 vent
BP 79/41
BS 77
Severe hypotension
Tachycardia
B/p177/770
patient diaphoretic, rapid shallow breathing, RR mid to high 20's. satting 90% on full vent support
pt has a rectal temp of 101.2

## 2024-11-21 NOTE — PROVIDER CONTACT NOTE (OTHER) - ACTION/TREATMENT ORDERED:
per NP, 91% on full vent support is ok, hydralazine IVP to be ordered per NP, 91% on full vent support is ok, hydralazine IVP and oxycodone to be ordered. per NP, give oxycodone and hydralazine

## 2024-11-21 NOTE — PROVIDER CONTACT NOTE (OTHER) - ASSESSMENT
patient diaphoretic, rapid shallow breathing with accessory muscle use noted, RR mid to high 20's. satting 90% on full vent support. patient edematous +4 b/l upper and lower extremities

## 2024-11-21 NOTE — PROGRESS NOTE ADULT - PROBLEM SELECTOR PLAN 12
- Patients Daughter updated at bedside by RCU Team today  - MOLST Updated 11/19: Remains DNR / + Intubate, No Pressors / No transfer to ICU Setting   - Ongoing GOC discussions; Daughter continues to discuss options with family; currently cont medical management  - Palliative care continues to follow

## 2024-11-21 NOTE — PROVIDER CONTACT NOTE (OTHER) - BACKGROUND
pt s/p G-J  exchange via EGD
Patient positive C.diff started on oral vancomycin 11/1/2024, admitting dx: respiratory failure,
Pt admitted for acute respiratory syndrome
Pt mild hypotension at around 00:00, see note
pmh type 2 DM, HTN
dx: ARDS
pt admitted with acute respiratory distress syndrome
Vent dependent  ,Respiratory failure Pneumonia
pt admitted with acute respiratory distress syndrome
Patient admitted with Hypoxemia and episode of Bright red blood per rectum
pt admitted with acutre respiratory distress syndrome
74 year F admitted for ARDS
Patient admitted with Hypoxemia and episode of Bright red blood per rectum
Pt went down to CT for clearance of clogged peg, one port cleared. pt to be started on @20cc/hr per provider
pmh of t2dm
pt admitted for acute respiratory distress syndrome
Admitted for resp distress, chronic vent, HX of CVA, DM, COPD,
Pt bp tends to run soft, but usually 90s-100s/50s-60s. feeds restarted @20/hr after 20:30

## 2024-11-21 NOTE — PROVIDER CONTACT NOTE (OTHER) - DATE AND TIME:
01-Nov-2024 06:30
01-Nov-2024 07:54
19-Oct-2024 02:38
04-Nov-2024 12:25
15-Oct-2024 00:00
16-Oct-2024 18:40
17-Oct-2024 11:45
25-Oct-2024 06:00
08-Oct-2024 17:52
15-Oct-2024 13:30
16-Oct-2024 12:15
17-Nov-2024 11:45
21-Nov-2024 12:05
18-Nov-2024 04:55
21-Nov-2024 12:35
02-Nov-2024 12:31
14-Oct-2024 19:19
19-Nov-2024 20:34
13-Oct-2024 17:42
15-Nov-2024 21:46
16-Nov-2024 05:36
18-Oct-2024 20:36
14-Oct-2024 21:33
18-Nov-2024 00:15
19-Oct-2024 00:20

## 2024-11-21 NOTE — PROGRESS NOTE ADULT - PROBLEM SELECTOR PLAN 1
Inpatient Plan:  - Please monitor blood glucose values q 6 hours while on tube feeding or NPO  - Continue Lantus to 10u QAM  - Continue Admelog to 5u q 6 hours while on 24 continuous TFs ( HOLD if tube feeds held )   - C/w moderate dose Admelog correction scale q 6 hours while on 24 hour tube feeding   - Please keep hypoglycemia protocol in place   - Please inform Endocrine team for any changes in tube feeding,  steroid  - Please keep all IV abx in NS solution if possible!    Discharge Plan:  - TBD depending on insulin requirement and discharge tube feeding regimen (Bolus vs continuous tube feeding)   - If bolus tube feeding > Lantus plus Humalog   - Patient should have BGs checked 4x a day while on TFs.    Daughter aware to contact Endocrinologist if BG <70mg/dL x1 or >200mg/dL consistently or >400mg/dL x1.   - Followup with Dr Ruth: [facility]: [address] [place] [address]. [address]. [place] [address]. Tel: [phone] Telemedicine- daughter to schedule.   - Make sure pt has Rx for all DM supplies and insulin

## 2024-11-21 NOTE — PROGRESS NOTE ADULT - SUBJECTIVE AND OBJECTIVE BOX
Follow Up:  leukocytosis    Interval History/ROS:  unresponsive    Allergies  walnut (Unknown)  metronidazole (Rash)  Lyrica (Unknown)  penicillin (Unknown)  Pineapple (Unknown)  Tagamet (Unknown)  heparin (Unknown)  Pecans (Unknown)  Hazelnut (Unknown)  meropenem (Rash)        ANTIMICROBIALS:  ceftolozane/tazobactam IVPB 3000 every 8 hours      OTHER MEDS:  MEDICATIONS  (STANDING):  acetaminophen   Oral Liquid .. 650 every 6 hours PRN  albuterol/ipratropium for Nebulization 3 every 6 hours  aluminum hydroxide/magnesium hydroxide/simethicone Suspension 30 every 4 hours PRN  dextrose 50% Injectable 25 once  dextrose 50% Injectable 12.5 once  escitalopram 10 <User Schedule>  fentaNYL   Patch  12 MICROgram(s)/Hr 1 every 72 hours  fentaNYL   Patch  25 MICROgram(s)/Hr 1 every 72 hours  gabapentin Solution 250 <User Schedule>  glucagon  Injectable 1 once  hydrocortisone sodium succinate Injectable 25 every 8 hours  influenza  Vaccine (HIGH DOSE) 0.5 once  insulin glargine Injectable (LANTUS) 10 <User Schedule>  insulin lispro (ADMELOG) corrective regimen sliding scale  every 6 hours  insulin lispro Injectable (ADMELOG) 5 every 6 hours  levothyroxine 125 <User Schedule>  oxyCODONE    Solution 5 every 6 hours PRN  pantoprazole  Injectable 40 every 12 hours      Vital Signs Last 24 Hrs  T(C): 36.3 (21 Nov 2024 11:52), Max: 36.3 (21 Nov 2024 11:52)  T(F): 97.4 (21 Nov 2024 11:52), Max: 97.4 (21 Nov 2024 11:52)  HR: 94 (21 Nov 2024 15:52) (58 - 124)  BP: 109/61 (21 Nov 2024 14:17) (109/61 - 238/118)  BP(mean): --  RR: 20 (21 Nov 2024 14:17) (18 - 20)  SpO2: 104% (21 Nov 2024 15:52) (91% - 116%)    Parameters below as of 21 Nov 2024 14:17  Patient On (Oxygen Delivery Method): ventilator    O2 Concentration (%): 50    PHYSICAL EXAM:  General: ill appearing  Neurology: unresponsive  Respiratory: scattered rhonchi  CV: RRR, S1S2, no murmurs, rubs or gallops  Abdominal: Soft, Non-tender, non-distended, normal bowel sounds  scarl ucler at Grade 2 depth  does not appear infected  Extremities: 2+ edema,   Line Sites: Clear  Skin: No rash                          7.5    15.39 )-----------( 65       ( 21 Nov 2024 10:32 )             24.5   WBC Count: 15.39 (11-21 @ 10:32)  WBC Count: 17.83 (11-20 @ 06:46)  WBC Count: 27.15 (11-19 @ 07:12)  WBC Count: 22.92 (11-18 @ 07:12)  WBC Count: 15.46 (11-17 @ 06:30)      11-21    131[L]  |  93[L]  |  56[H]  ----------------------------<  190[H]  4.0   |  16[L]  |  0.87    Ca    8.3[L]      21 Nov 2024 07:09  Phos  4.7     11-21  Mg     1.9     11-21    TPro  6.0  /  Alb  2.1[L]  /  TBili  1.0  /  DBili  x   /  AST  50[H]  /  ALT  27  /  AlkPhos  162[H]  11-21    MICROBIOLOGY:  Catheterized Catheterized  11-18-24   >100,000 CFU/ml Enterococcus faecium (vancomycin resistant)      .Blood BLOOD  11-18-24   No growth at 72 Hours  --  --      .Sputum Sputum  11-18-24   Mixed gram negative rods including  Numerous Pseudomonas aeruginosa (Carbapenem Resistant) Multiple  Morphological Strains  Commensal vinay consistent with body site  --  Pseudomonas aeruginosa (Carbapenem Resistant)  Pseudomonas aeruginosa (Carbapenem Resistant)      .Blood BLOOD  11-18-24   No growth at 72 Hours  --  --      Trach Asp Tracheal Aspirate  11-10-24   Few Pseudomonas aeruginosa (Carbapenem Resistant)  Commensal vinay consistent with body site  --  Pseudomonas aeruginosa (Carbapenem Resistant)      .Blood BLOOD  11-10-24   No growth at 5 days  --  --      .Sputum Sputum  11-04-24   Moderate Mixed gram negative rods including  Moderate Pseudomonas aeruginosa  Commensal vinay consistent with body site  --  Pseudomonas aeruginosa      Clean Catch Clean Catch (Midstream)  11-03-24   No growth  --  --      .Blood BLOOD  11-03-24   No growth at 5 days  --  --      .Blood BLOOD  11-03-24   No growth at 5 days  --  --      .Blood BLOOD  11-01-24   Growth in anaerobic bottle: Staphylococcus epidermidis  Isolation of Coagulase negative Staphylococcus from single blood culture sets may represent contamination.      Catheterized Catheterized  11-01-24   No growth  --  --      .Blood BLOOD  11-01-24   No growth at 5 days  --  --      Trach Asp Tracheal Aspirate  10-27-24   Moderate Pseudomonas aeruginosa  Moderate Pseudomonas aeruginosa #2  Multiple Morphological Strains  Commensal vinay consistent with body site  --  Pseudomonas aeruginosa  Pseudomonas aeruginosa      .Blood BLOOD  10-27-24   No growth at 5 days  --  --        RADIOLOGY:  < from: Xray Chest 1 View- PORTABLE-Routine (Xray Chest 1 View- PORTABLE-Routine .) (11.18.24 @ 10:50) >  IMPRESSION:  Congestive changes with possible right lung infiltrate.    < end of copied text >      Zion Newman MD; Division of Infectious Disease; Pager: [phone]; nights and weekends: [phone]

## 2024-11-21 NOTE — PROVIDER CONTACT NOTE (OTHER) - NAME OF MD/NP/PA/DO NOTIFIED:
(NIGHT) IVAN Sauer [id number]
Isaura Castro NP
Luciano Burgess
Mayra Lawson, N
Mayra Lawson, NP
Mayra Lawson, NP
PRITI Lawson
PRITI Lawson
Mayra Ruano
Noreen Hurtado
Wanda Silva
(NIGHT) IVAN Sauer [id number]
Miguelina Dsouza
Noreen Hurtado
PRITI horton
Margie Robison
Nelly CORONEL
Sylvain Bergeron
Mayra Lawson, NP
BERNA Owens
Isaura Castro
Wanda Silva
Darcy JAIN
Luciano Burgess NP
Noreen Hurtado

## 2024-11-21 NOTE — PROGRESS NOTE ADULT - ASSESSMENT
73 yo woman PMH T2DM on insulin, HTN, HLD, hypothyroidism, RA on Prednisone, Fibromyalgia, cerebral aneurysm s/p repair, chronic hypercapnic respiratory failure s/p trach (vent dependent) and chronic PEG 2022, recurrent C. diff infection , persistent GJ tube leaks now s/p GC fistula repair 8/12  admitted 10/7/24 with resp distress and found to have RML opacity     Antibiotics  Ceftriaxone 10/7  Azithro 10/7  Cefepime 10/7--> 10/12  meropenem 10/15 --> 10/23  IV Vanco 10/17 --> 10/21  Vanco via NGT 10/24; 11/1-->  Metronidazole 11/3 --> 11/7  Cefepime 11/5 --> 11/10; 11/11--> 11/15  Erythromycin 11/11-->  Ceftolozane, tazobactam 11/18 -->      10/10 melena, anemia, blood tx  10/14 EGD for GJ exchange and piror PEG site closure  One benign-appearing, intrinsic moderate stenosis was found in the upper third of the        esophagus. The stenosis was traversed.       The cardia and gastric fundus were normal.       A gastric tube with tip in the jejunum was found in the gastric body. The PEG required        removal because it was leaking. The J tube extension (12F) was removed first. An externally        removable 24 Fr EndoVive Safety gastrostomy tube was lubricated. The guide wire was passed        through the existing G-tube port and snared endoscopically. The endoscope and snare were then        removed, pulling the wire out through the mouth. The g-tube was tied to the guidewire, pulled        through the mouth into the stomach and then pulled out from the stomach through the skin. The        bumper was attached to the gastrostomy tube. The feeding tube was then cut to an appropriate        length. The final position of the gastrostomy tube was confirmed by relook endoscopy, and        skin marking noted to be 3 cm at the external bumper. The final tension and compression of        the abdominal wall by the PEG tube and external bumper were checked and revealed that the        bumper was moderately tight and mildly deforming the skin. The J tube extension (12 F        EndoVive Jejunal feeding tube) was advanced into the stomach. The tip of the J tube had a        loop thread that was captured with a Resolution clip. The thread and the J tube extension        were advanced to the proximal jejunum, and the endoclip along with the tip of the J tube was        attached to the wall securely. The J tube extension was capped, and the tube site was cleaned        and dressed.       A small fistula was found on the anterior wall of the gastric body related to prior G tube        site. Coagulation of tissue near the fistula using argon plasma at 1.2 liters/minute and 35        peraza was successful. To closure the gastrocutaneous fistula, four hemostatic clips were        successfully placed (MR conditional, two 16 mm DuraClip and 2 Mantis clips.       The examined duodenum was normal.    10/14, 10/15 Fever, transient hypoxia post procedure  10/15 ProCalcitonin = 8.48 suggestive of bacterial process; BCx x2 NGTD; Trach: Pseudomonas   - though benign mg stain  10;16 Leukocytosis WBC = 14.26  10/17 fever, hypotension, abd distension, no diarrhea noted this am WBC = 15.02; Rising Procalcitonin = 38.49; Obstructed J tube   10/18  lost IV access re-gained  - CT scan late this afternoon  WBC = 8.68; Rising Procalcitonin >100  10/18 imaging suggests multifocal pneumonia  10/21 improved chest exam, Procalcitonin level considerably decreased, decreased FiO2  - afebrile since 10/18  10/21 UGI endoscopy done  Findings:       The esophagus was normal. A fistula was found on the anterior wall of the gastric body.       There was evidence of a gastrostomy present in the gastric body. The patient was placed in        the supine position. The endoscope was advanced to the jejunum and the prior placed J tube        with loop attached to the wall and the loop thread was cut by endoclip. The J tube and the G        tube were removed. The gastrostomy fistula was utilized and an Avanos 22 F        Gastrostomy/Jejunal tube was advanced. The jejunal tip was advanced through the pylorus and        into the duodenum. The endoscope was then advanced to the duodenum and the loop thread at the        tip of the J tube was captured and advanced to the proximal jejunum. The loop thread was        clipped to wall by a [employer] Resolution clip. The J tube flushed easily and the G        tube decompress the abdomen easily. The internal balloon was insufflated with 10 cc of water        to hold the GJ tube in place. The outside marking was at 3.  10/23  no distress, liquid stools noted  - Meropenem discontinued  GI PCR NEG  10/24  persistent diarrhea  Cdiff NEG; enteral vanco stopped and Immodium provided  10/28 low grade temps, mild leukocytosis  10/30 blood transfusion  11/1  fever  +Cdiff  11/3 continued fever  - +BC Stph epi most likely procurement contaminant,  modest diarrhea reported  - daughter described increased diarrhea in evenings - Pseudomonas airway colonization is long term, no suggestion of pneumonia at present, sacral decub remains superifical will granulated without signs of infection  11/4  - fever, abnormal chest exam though FIO2 still 30%  rectal tube inserted for increased diarrhea  11/5 lethargy, fever, leukocytosis, increased lactate, hyponatremia  11/6 sputum gram stain suggests persistent Pseudomonas colonization  - continued fever, leukocytosis  11/7 unclear if LLL consolidation represents infection. Repeat Procalcitonin  11/7= 2.92 decreased  11/8 continued fevers  +diarrhea, lethargy continues  to complete Cefepime for LLL pneumonia v atelectasis tomorrow 11/9 11/11 increased fever with increased WBC and increased Procalcitonin off Cefepime noted, which was resumed  Concern if feed is refluxing back to the stomach versus J tube now in the stomach.     imaging show G tube in 1st part of duodenum. there are bibasilar L>R consolidations - likely continual reaspiration  11/12 persistent leukocytosis  11/13, decreased height of fever, decreased leukocytosis, Procalcitonin decreased to 52% of the 11/11 value suggesting good therapeutic response. s/p endoscopic replacement of feeding tube into jejunum  - Rectal tube came out  11/15 sleepy  - leukocytosis almost completely resolved      11/18 disturbing decompensation with clinical toxicity, unresponsive, increased leukocytosis, lactic acidosis   extensive antibiotic exposure with chronic Pseudomonas colonization of upper airway makes antibiotic resistance likely  Dental ASSESSMENT appreciated : Bedside Exam: No evidence of swelling, abscess or fistula. However, odontogenic infection cannot be fully ruled out without dental radiographs. No aspiration [of tooth] rk at this time.    11/19 increased leukocytosis, persistent lactic acidosis,   decreased ProCalcitonin  11/21  blood work improved  - I discussed with daughter who is considering comfort care at home after current antibiotic course is completed         Issues  recurrent Cdiff  cerebral aneurysm s/p repair  chronic hypercapnic respiratory failure s/p trach (vent dependent) and chronic PEG 2022  anemia  - had iron deficiency  Pseudomonas aeruginosa upper airway colonization  (most recent isolate Resist to Cipro/Levo)  T2DM on insulin  10.14.24 -A1C: 5.8%  HTN  HLD  hypothyroidism  RA on Prednisone  Fibromyalgia  debility  sacral ulcer largely healed  malnourished/chronic catabolic state  aspiration      Suggest  Continue vanco 125 q 6 hours via J tube 11/1 --> --> 11/20   Continue Ceftolozane, tazobactam 11/18 through 11/25   I will follow up on culture results    discussed with [facility] NP

## 2024-11-22 NOTE — PROGRESS NOTE ADULT - NUTRITIONAL ASSESSMENT
Diet, NPO with Tube Feed:   Tube Feeding Modality: Jejunostomy  [employer] Peptide 1.5 (KFPEPT1.5RTH)  Total Volume for 24 Hours (mL): 960  Continuous  Until Goal Tube Feed Rate (mL per Hour): 40  Tube Feed Duration (in Hours): 24  Tube Feed Start Time: 06:00  Free Water Flush  Pump   Rate (mL per Hour): 50   Frequency: Every Hour  Free Water Flush Instructions:  50ml free water flushes Q1hr  Alfred(7 Gm Arginine/7 Gm Glut/1.2 Gm HMB     Qty per Day:  2  No Carb Prosource (1pkg = 15gms Protein)     Qty per Day:  1  Banatrol TF     Qty per Day:  1/2 packet per daily (11-19-24 @ 10:25) [Active]      Please see RD assessment and/or follow up.  Managed by primary team as well

## 2024-11-22 NOTE — PROGRESS NOTE ADULT - NS ATTEND AMEND GEN_ALL_CORE FT
75 yo F w/ PMH DM2 (insulin-dependent), HTN, HLD, hypothyroidism, RA on Prednisone, fibromyalgia, cerebral aneurysm s/p repair, chronic hypoxemia and hypercapnic respiratory failure s/p trach, vent-dependent, recurrent C diff infection, oropharyngeal dysphagia s/p G-J tube with persistent GJ tube leaks now s/p GC fistula repair 8/12, and recent presentation 5 days PTA for rectal bleeding requiring transfusion in the ED who now presents with acute hypoxemic respiratory distress 2/2 RML PNA, admitted to the [facility] for further management. Found to have Pseudomonas aeruginosa in sputum culture and urine culture with ESBL E. coli s/p course of meropenem. Course complicated by antibiotic-associated diarrhea.           - today remained more interactive opening eyes, overall picture c/w septic encephelopathy   - ventilation stable, decrease MV to baseline settings  - cont Duonebs + 3% q6h for ACT  - improved sepsis w/ Zerbaxa as sputum cx now w/ more resistant PsA, f/u ID reccs    - dental eval performed to eval oral source for infection and felt to not be the source of sepsis  - cont monitoring blood gas and clinically  - BP has been labile, off midodrine now and may need to restart her htn regimen  - will diurese today given significant edema  - monitor i/o and now off bicarb gtt given ph   - Anemia multifactorial, hemorrhoids, AOCD. s/p EGD without active bleeding, transfuse for Hb < 7  - cont to monitor FS, cont tapering stress dose steroids  (for RA on home prednisone 5 mg daily )  - cont wound care to sacrum for decub    - DVT ppx- only scd for now given significant anemia a few days ago    Prognosis and GOC d/w daughter at bedside

## 2024-11-22 NOTE — PROGRESS NOTE ADULT - ASSESSMENT
75 yo woman PMH T2DM on insulin, HTN, HLD, hypothyroidism, RA on Prednisone, Fibromyalgia, cerebral aneurysm s/p repair, chronic hypercapnic respiratory failure s/p trach (vent dependent) and chronic PEG 2022, recurrent C. diff infection , persistent GJ tube leaks now s/p GC fistula repair 8/12  admitted 10/7/24 with resp distress and found to have RML opacity     Antibiotics  Ceftriaxone 10/7  Azithro 10/7  Cefepime 10/7--> 10/12  meropenem 10/15 --> 10/23  IV Vanco 10/17 --> 10/21  Vanco via NGT 10/24; 11/1-->  Metronidazole 11/3 --> 11/7  Cefepime 11/5 --> 11/10; 11/11--> 11/15  Erythromycin 11/11-->  Ceftolozane, tazobactam 11/18 -->      10/10 melena, anemia, blood tx  10/14 EGD for GJ exchange and piror PEG site closure  One benign-appearing, intrinsic moderate stenosis was found in the upper third of the        esophagus. The stenosis was traversed.       The cardia and gastric fundus were normal.       A gastric tube with tip in the jejunum was found in the gastric body. The PEG required        removal because it was leaking. The J tube extension (12F) was removed first. An externally        removable 24 Fr EndoVive Safety gastrostomy tube was lubricated. The guide wire was passed        through the existing G-tube port and snared endoscopically. The endoscope and snare were then        removed, pulling the wire out through the mouth. The g-tube was tied to the guidewire, pulled        through the mouth into the stomach and then pulled out from the stomach through the skin. The        bumper was attached to the gastrostomy tube. The feeding tube was then cut to an appropriate        length. The final position of the gastrostomy tube was confirmed by relook endoscopy, and        skin marking noted to be 3 cm at the external bumper. The final tension and compression of        the abdominal wall by the PEG tube and external bumper were checked and revealed that the        bumper was moderately tight and mildly deforming the skin. The J tube extension (12 F        EndoVive Jejunal feeding tube) was advanced into the stomach. The tip of the J tube had a        loop thread that was captured with a Resolution clip. The thread and the J tube extension        were advanced to the proximal jejunum, and the endoclip along with the tip of the J tube was        attached to the wall securely. The J tube extension was capped, and the tube site was cleaned        and dressed.       A small fistula was found on the anterior wall of the gastric body related to prior G tube        site. Coagulation of tissue near the fistula using argon plasma at 1.2 liters/minute and 35        peraza was successful. To closure the gastrocutaneous fistula, four hemostatic clips were        successfully placed (MR conditional, two 16 mm DuraClip and 2 Mantis clips.       The examined duodenum was normal.    10/14, 10/15 Fever, transient hypoxia post procedure  10/15 ProCalcitonin = 8.48 suggestive of bacterial process; BCx x2 NGTD; Trach: Pseudomonas   - though benign mg stain  10;16 Leukocytosis WBC = 14.26  10/17 fever, hypotension, abd distension, no diarrhea noted this am WBC = 15.02; Rising Procalcitonin = 38.49; Obstructed J tube   10/18  lost IV access re-gained  - CT scan late this afternoon  WBC = 8.68; Rising Procalcitonin >100  10/18 imaging suggests multifocal pneumonia  10/21 improved chest exam, Procalcitonin level considerably decreased, decreased FiO2  - afebrile since 10/18  10/21 UGI endoscopy done  Findings:       The esophagus was normal. A fistula was found on the anterior wall of the gastric body.       There was evidence of a gastrostomy present in the gastric body. The patient was placed in        the supine position. The endoscope was advanced to the jejunum and the prior placed J tube        with loop attached to the wall and the loop thread was cut by endoclip. The J tube and the G        tube were removed. The gastrostomy fistula was utilized and an Avanos 22 F        Gastrostomy/Jejunal tube was advanced. The jejunal tip was advanced through the pylorus and        into the duodenum. The endoscope was then advanced to the duodenum and the loop thread at the        tip of the J tube was captured and advanced to the proximal jejunum. The loop thread was        clipped to wall by a [employer] Resolution clip. The J tube flushed easily and the G        tube decompress the abdomen easily. The internal balloon was insufflated with 10 cc of water        to hold the GJ tube in place. The outside marking was at 3.  10/23  no distress, liquid stools noted  - Meropenem discontinued  GI PCR NEG  10/24  persistent diarrhea  Cdiff NEG; enteral vanco stopped and Immodium provided  10/28 low grade temps, mild leukocytosis  10/30 blood transfusion  11/1  fever  +Cdiff  11/3 continued fever  - +BC Stph epi most likely procurement contaminant,  modest diarrhea reported  - daughter described increased diarrhea in evenings - Pseudomonas airway colonization is long term, no suggestion of pneumonia at present, sacral decub remains superifical will granulated without signs of infection  11/4  - fever, abnormal chest exam though FIO2 still 30%  rectal tube inserted for increased diarrhea  11/5 lethargy, fever, leukocytosis, increased lactate, hyponatremia  11/6 sputum gram stain suggests persistent Pseudomonas colonization  - continued fever, leukocytosis  11/7 unclear if LLL consolidation represents infection. Repeat Procalcitonin  11/7= 2.92 decreased  11/8 continued fevers  +diarrhea, lethargy continues  to complete Cefepime for LLL pneumonia v atelectasis tomorrow 11/9 11/11 increased fever with increased WBC and increased Procalcitonin off Cefepime noted, which was resumed  Concern if feed is refluxing back to the stomach versus J tube now in the stomach.     imaging show G tube in 1st part of duodenum. there are bibasilar L>R consolidations - likely continual reaspiration  11/12 persistent leukocytosis  11/13, decreased height of fever, decreased leukocytosis, Procalcitonin decreased to 52% of the 11/11 value suggesting good therapeutic response. s/p endoscopic replacement of feeding tube into jejunum  - Rectal tube came out  11/15 sleepy  - leukocytosis almost completely resolved      11/18 disturbing decompensation with clinical toxicity, unresponsive, increased leukocytosis, lactic acidosis   extensive antibiotic exposure with chronic Pseudomonas colonization of upper airway makes antibiotic resistance likely  Dental ASSESSMENT appreciated : Bedside Exam: No evidence of swelling, abscess or fistula. However, odontogenic infection cannot be fully ruled out without dental radiographs. No aspiration [of tooth] rk at this time.    11/19 increased leukocytosis, persistent lactic acidosis,   decreased ProCalcitonin  11/21  blood work improved  - I discussed with daughter who is considering comfort care at home after current antibiotic course is completed  11/22 reponsive now, clinically improved       Issues  recurrent Cdiff  cerebral aneurysm s/p repair  chronic hypercapnic respiratory failure s/p trach (vent dependent) and chronic PEG 2022  anemia  - had iron deficiency  Pseudomonas aeruginosa upper airway colonization  (most recent isolate Resist to Cipro/Levo)  T2DM on insulin  10.14.24 -A1C: 5.8%  HTN  HLD  hypothyroidism  RA on Prednisone  Fibromyalgia  debility  sacral ulcer largely healed  malnourished/chronic catabolic state  aspiration      Suggest  Continue vanco 125 q 6 hours via J tube 11/1 --> --> 11/20   Continue Ceftolozane, tazobactam 11/18 through 11/25  I prefer to continue po Vanco through 11/25    discussed with [facility] NP

## 2024-11-22 NOTE — PROGRESS NOTE ADULT - SUBJECTIVE AND OBJECTIVE BOX
Follow Up:  sepsis pneumonia    Interval History/ROS:  rousable to voice,  directed gaze    Allergies  walnut (Unknown)  metronidazole (Rash)  Lyrica (Unknown)  penicillin (Unknown)  Pineapple (Unknown)  Tagamet (Unknown)  heparin (Unknown)  Pecans (Unknown)  Hazelnut (Unknown)  meropenem (Rash)    ANTIMICROBIALS:  ceftolozane/tazobactam IVPB 3000 every 8 hours      OTHER MEDS:  MEDICATIONS  (STANDING):  acetaminophen   Oral Liquid .. 650 every 6 hours PRN  albuterol/ipratropium for Nebulization 3 every 6 hours  aluminum hydroxide/magnesium hydroxide/simethicone Suspension 30 every 4 hours PRN  dextrose 50% Injectable 25 once  dextrose 50% Injectable 12.5 once  escitalopram 10 <User Schedule>  fentaNYL   Patch  12 MICROgram(s)/Hr 1 every 72 hours  fentaNYL   Patch  25 MICROgram(s)/Hr 1 every 72 hours  gabapentin Solution 250 <User Schedule>  glucagon  Injectable 1 once  hydrALAZINE Injectable 10 every 8 hours  influenza  Vaccine (HIGH DOSE) 0.5 once  insulin glargine Injectable (LANTUS) 10 <User Schedule>  insulin lispro (ADMELOG) corrective regimen sliding scale  every 6 hours  insulin lispro Injectable (ADMELOG) 7 every 6 hours  levothyroxine 125 <User Schedule>  oxyCODONE    Solution 5 every 6 hours PRN  pantoprazole  Injectable 40 every 12 hours      Vital Signs Last 24 Hrs  T(C): 36.2 (22 Nov 2024 17:10), Max: 36.3 (21 Nov 2024 21:51)  T(F): 97.2 (22 Nov 2024 17:10), Max: 97.4 (21 Nov 2024 21:51)  HR: 98 (22 Nov 2024 17:30) (84 - 102)  BP: 175/78 (22 Nov 2024 17:10) (157/78 - 190/82)  BP(mean): --  RR: 18 (22 Nov 2024 17:10) (18 - 20)  SpO2: 95% (22 Nov 2024 17:30) (95% - 98%)    Parameters below as of 22 Nov 2024 17:30  Patient On (Oxygen Delivery Method): ventilator        PHYSICAL EXAM:  General: NAD, Non-toxic  Neurology: responds briefly  Respiratory: trach in place, rhonchi   CV: RRR, S1S2, no murmurs, rubs or gallops  Abdominal: Soft, Non-tender, distended, normal bowel sounds  Extremities: generlized  edema  Line Sites: Clear  Skin: No rash                          7.7    13.93 )-----------( 71       ( 22 Nov 2024 06:33 )             26.3   WBC Count: 13.93 (11-22 @ 06:33)  WBC Count: 15.39 (11-21 @ 10:32)  WBC Count: 17.83 (11-20 @ 06:46)  WBC Count: 27.15 (11-19 @ 07:12)  WBC Count: 22.92 (11-18 @ 07:12)    11-22    136  |  96  |  61[H]  ----------------------------<  143[H]  3.0[L]   |  19[L]  |  0.74    Ca    7.9[L]      22 Nov 2024 06:35  Phos  5.3     11-22  Mg     1.8     11-22    TPro  6.0  /  Alb  2.7[L]  /  TBili  0.9  /  DBili  x   /  AST  31  /  ALT  24  /  AlkPhos  132[H]  11-22      MICROBIOLOGY:  Catheterized Catheterized  11-18-24   >100,000 CFU/ml Enterococcus faecium (vancomycin resistant)    .Blood BLOOD  11-18-24   No growth at 4 days  --  --      .Sputum Sputum  11-18-24   Mixed gram negative rods including  Numerous Pseudomonas aeruginosa (Carbapenem Resistant) Multiple  Morphological Strains  Commensal vinay consistent with body site  --  Pseudomonas aeruginosa (Carbapenem Resistant)  Pseudomonas aeruginosa (Carbapenem Resistant)      .Blood BLOOD  11-18-24   No growth at 4 days  --  --      Trach Asp Tracheal Aspirate  11-10-24   Few Pseudomonas aeruginosa (Carbapenem Resistant)  Commensal vinay consistent with body site  --  Pseudomonas aeruginosa (Carbapenem Resistant)      .Blood BLOOD  11-10-24   No growth at 5 days  --  --      .Sputum Sputum  11-04-24   Moderate Mixed gram negative rods including  Moderate Pseudomonas aeruginosa  Commensal vinay consistent with body site  --  Pseudomonas aeruginosa      Clean Catch Clean Catch (Midstream)  11-03-24   No growth  --  --      .Blood BLOOD  11-03-24   No growth at 5 days  --  --      .Blood BLOOD  11-03-24   No growth at 5 days  --  --      .Blood BLOOD  11-01-24   Growth in anaerobic bottle: Staphylococcus epidermidis  Isolation of Coagulase negative Staphylococcus from single blood culture  sets may represent  contamination. Contact the Microbiology Department at [phone] if  susceptibility testing is needed.  clinically indicated.  Direct identification is available within approximately 3-5  hours either by Blood Panel Multiplexed PCR or Direct  MALDI-TOF. Details: [email or web address]  --  Blood Culture PCR      Catheterized Catheterized  11-01-24   No growth  --  --      .Blood BLOOD  11-01-24   No growth at 5 days  --  --      Trach Asp Tracheal Aspirate  10-27-24   Moderate Pseudomonas aeruginosa  Moderate Pseudomonas aeruginosa #2  Multiple Morphological Strains  Commensal vinay consistent with body site  --  Pseudomonas aeruginosa  Pseudomonas aeruginosa      .Blood BLOOD  10-27-24   No growth at 5 days  --  --          v          RADIOLOGY:    Zion Newman MD; Division of Infectious Disease; Pager: [phone]; nights and weekends: [phone]

## 2024-11-22 NOTE — PROGRESS NOTE ADULT - SUBJECTIVE AND OBJECTIVE BOX
Patient is a 74y old  Female who presents with a chief complaint of Patient admitted with Hypoxemia and episode of Bright red blood per rectum (21 Nov 2024 16:48)    HPI:  75 yo F PMH T2DM on insulin, HTN, HLD, hypothyroidism, RA on Prednisone, Fibromyalgia, cerebral aneurysm s/p repair, chronic hypercapnic respiratory failure s/p trach (vent dependent) and chronic PEG 2022, recurrent C. diff infection (follows with Dr. Newman), persistent GJ tube leaks now s/p GC fistula repair 8/12 BIBEMS with daughter for respiratory distress, hypoxia to high 80s.  Pt also noted to have rectal bleeding this past week requiring transfusion 5 days ago in ED as per daughter.  In ED, pt afebrile, fiO2 96-98% on 40% on ventilator.  Chest Xray w/ opacification of right lung concerning for possible PNA.  Admitted to [facility] for further management.      (07 Oct 2024 18:58)    Interval Events:    REVIEW OF SYSTEMS:  [ ] Positive  [ ] All other systems negative  [ ] Unable to assess ROS because ________    Vital Signs Last 24 Hrs  T(C): 36.3 (11-22-24 @ 06:00), Max: 36.3 (11-21-24 @ 15:52)  T(F): 97.3 (11-22-24 @ 06:00), Max: 97.4 (11-21-24 @ 21:51)  HR: 88 (11-22-24 @ 09:02) (86 - 124)  BP: 167/84 (11-22-24 @ 06:00) (109/61 - 182/93)  RR: 20 (11-22-24 @ 06:00) (20 - 20)  SpO2: 96% (11-22-24 @ 09:02) (91% - 116%)    PHYSICAL EXAM:  HEENT:   [ ]Tracheostomy:  [ ]Pupils equal  [ ]No oral lesions  [ ]Abnormal    SKIN  [ ] No Rash  [ ] Abnormal  [ ] pressure    CARDIAC  [ ]Regular  [ ]Abnormal    PULMONARY  [ ]Bilateral Clear Breath Sounds  [ ]Normal Excursion  [ ]Abnormal    GI  [ ]PEG      [ ] +BS		              [ ]Soft, nondistended, nontender	  [ ]Abnormal    MUSCULOSKELETAL                                   [ ]Bedbound                 [ ]Abnormal    [ ]Ambulatory/OOB to chair                           EXTREMITIES                                         [ ]Normal  [ ]Edema                           NEUROLOGIC  [ ] Normal, non focal  [ ] Focal findings:    PSYCHIATRIC  [ ]Alert and appropriate  [ ] Sedated	 [ ]Agitated    :  Mcbride: [ ] Yes, if yes: Date of Placement:                   [  ] No    LINES: Central Lines [ ] Yes, if yes: Date of Placement                                     [  ] No    HOSPITAL MEDICATIONS:  MEDICATIONS  (STANDING):  albuterol/ipratropium for Nebulization 3 milliLiter(s) Nebulizer every 6 hours  artificial  tears Solution 1 Drop(s) Both EYES every 6 hours  ascorbic acid 500 milliGRAM(s) Oral daily  Biotene Dry Mouth Oral Rinse 5 milliLiter(s) Swish and Spit every 6 hours  calcium carbonate 1250 mG  + Vitamin D (OsCal 500 + D) 1 Tablet(s) Oral <User Schedule>  ceftolozane/tazobactam IVPB 3000 milliGRAM(s) IV Intermittent every 8 hours  chlorhexidine 0.12% Liquid 15 milliLiter(s) Oral Mucosa every 12 hours  chlorhexidine 4% Liquid 1 Application(s) Topical daily  dextrose 5%. 1000 milliLiter(s) (100 mL/Hr) IV Continuous <Continuous>  dextrose 5%. 1000 milliLiter(s) (50 mL/Hr) IV Continuous <Continuous>  dextrose 50% Injectable 25 Gram(s) IV Push once  dextrose 50% Injectable 12.5 Gram(s) IV Push once  diclofenac sodium 1% Gel 2 Gram(s) Topical every 6 hours  escitalopram 10 milliGRAM(s) Oral <User Schedule>  fentaNYL   Patch  12 MICROgram(s)/Hr 1 Patch Transdermal every 72 hours  fentaNYL   Patch  25 MICROgram(s)/Hr 1 Patch Transdermal every 72 hours  FIRST- Mouthwash  BLM 5 milliLiter(s) Swish and Spit every 8 hours  gabapentin Solution 250 milliGRAM(s) Oral <User Schedule>  glucagon  Injectable 1 milliGRAM(s) IntraMuscular once  hemorrhoidal Ointment 1 Application(s) Rectal at bedtime  influenza  Vaccine (HIGH DOSE) 0.5 milliLiter(s) IntraMuscular once  insulin glargine Injectable (LANTUS) 10 Unit(s) SubCutaneous <User Schedule>  insulin lispro (ADMELOG) corrective regimen sliding scale   SubCutaneous every 6 hours  insulin lispro Injectable (ADMELOG) 5 Unit(s) SubCutaneous every 6 hours  lactobacillus acidophilus 1 Tablet(s) Oral <User Schedule>  levothyroxine 125 MICROGram(s) Oral <User Schedule>  lidocaine   4% Patch 4 Patch Transdermal every 24 hours  nystatin Powder 1 Application(s) Topical every 8 hours  pantoprazole  Injectable 40 milliGRAM(s) IV Push every 12 hours  potassium chloride   Solution 40 milliEquivalent(s) Oral once  sodium chloride 1 Gram(s) Oral every 8 hours  sodium chloride 0.65% Nasal 1 Spray(s) Both Nostrils every 6 hours  triamcinolone 0.1% Oral Paste 1 Application(s) Topical every 8 hours  witch hazel Pads 1 Application(s) Topical every 12 hours    MEDICATIONS  (PRN):  acetaminophen   Oral Liquid .. 650 milliGRAM(s) Oral every 6 hours PRN Temp greater or equal to 38C (100.4F), Mild Pain (1 - 3)  aluminum hydroxide/magnesium hydroxide/simethicone Suspension 30 milliLiter(s) Enteral Tube every 4 hours PRN Dyspepsia  oxyCODONE    Solution 5 milliGRAM(s) Oral every 6 hours PRN Moderate Pain (4 - 6)      LABS:                        7.7    13.93 )-----------( 71       ( 22 Nov 2024 06:33 )             26.3     11-22    136  |  96  |  61[H]  ----------------------------<  143[H]  3.0[L]   |  19[L]  |  0.74    Ca    7.9[L]      22 Nov 2024 06:35  Phos  5.3     11-22  Mg     1.8     11-22    TPro  6.0  /  Alb  2.7[L]  /  TBili  0.9  /  DBili  x   /  AST  31  /  ALT  24  /  AlkPhos  132[H]  11-22      Mode: AC/ CMV (Assist Control/ Continuous Mandatory Ventilation)  RR (machine): 20  TV (machine): 400  FiO2: 40  PEEP: 5  ITime: 1  MAP: 13  PIP: 32   Patient is a 74y old  Female who presents with a chief complaint of Patient admitted with Hypoxemia and episode of Bright red blood per rectum (21 Nov 2024 16:48)    HPI:  73 yo F PMH T2DM on insulin, HTN, HLD, hypothyroidism, RA on Prednisone, Fibromyalgia, cerebral aneurysm s/p repair, chronic hypercapnic respiratory failure s/p trach (vent dependent) and chronic PEG 2022, recurrent C. diff infection (follows with Dr. Newman), persistent GJ tube leaks now s/p GC fistula repair 8/12 BIBEMS with daughter for respiratory distress, hypoxia to high 80s.  Pt also noted to have rectal bleeding this past week requiring transfusion 5 days ago in ED as per daughter.  In ED, pt afebrile, fiO2 96-98% on 40% on ventilator.  Chest Xray w/ opacification of right lung concerning for possible PNA.  Admitted to [facility] for further management.  (07 Oct 2024 18:58)    Interval Events: No issues overnight.    REVIEW OF SYSTEMS:  [ ] Positive  [ ] All other systems negative  [x] Unable to assess ROS because patient is non-verbal    Vital Signs Last 24 Hrs  T(C): 36.3 (11-22-24 @ 06:00), Max: 36.3 (11-21-24 @ 15:52)  T(F): 97.3 (11-22-24 @ 06:00), Max: 97.4 (11-21-24 @ 21:51)  HR: 88 (11-22-24 @ 09:02) (86 - 124)  BP: 167/84 (11-22-24 @ 06:00) (109/61 - 182/93)  RR: 20 (11-22-24 @ 06:00) (20 - 20)  SpO2: 96% (11-22-24 @ 09:02) (91% - 116%)    PHYSICAL EXAM:  HEENT:   [X] Tracheostomy: #8 distal XLT cuffed Shiley   [X] PERRLA  [ ] No oral lesions  [ ] Abnormal    SKIN  [ ] No Rash  [ ] Abnormal  [X] pressure + Sacral Wound    CARDIAC  [X] Regular  [ ] Abnormal    PULMONARY  [ ] Bilateral Clear Breath Sounds  [ ] Normal Excursion  [X] Abnormal - Diffuses coarse breath sounds / decreased at bases bilaterally     GI  [X] PEG site covered w/ dry dressing              [X] Soft, nondistended, nontender	  [X] Abnormal; old PEG stoma site with small opening c/d/i,     MUSCULOSKELETAL                                   [X] Bedbound                 [ ] Abnormal    [ ] Ambulatory/OOB to chair                           EXTREMITIES                                         [ ] Normal  [X] Edema - Anasarca                        NEUROLOGIC  [ ] Normal, non focal  [X] Focal findings: Obtunded Minimally unresponsive to voice, grimacing occasionally, not following commands, no purposeful movements in all extremities, withdraws to noxious stimuli.    PSYCHIATRIC  [x ] somnolent  [] Sedated	 [ ]Agitated    :  Mcbride: [ ] Yes, if yes: Date of Placement:                   [X] No    LINES: Central Lines [ ] Yes, if yes: Date of Placement                                     [X] No    HOSPITAL MEDICATIONS:  MEDICATIONS  (STANDING):  albuterol/ipratropium for Nebulization 3 milliLiter(s) Nebulizer every 6 hours  artificial  tears Solution 1 Drop(s) Both EYES every 6 hours  ascorbic acid 500 milliGRAM(s) Oral daily  Biotene Dry Mouth Oral Rinse 5 milliLiter(s) Swish and Spit every 6 hours  calcium carbonate 1250 mG  + Vitamin D (OsCal 500 + D) 1 Tablet(s) Oral <User Schedule>  ceftolozane/tazobactam IVPB 3000 milliGRAM(s) IV Intermittent every 8 hours  chlorhexidine 0.12% Liquid 15 milliLiter(s) Oral Mucosa every 12 hours  chlorhexidine 4% Liquid 1 Application(s) Topical daily  dextrose 5%. 1000 milliLiter(s) (100 mL/Hr) IV Continuous <Continuous>  dextrose 5%. 1000 milliLiter(s) (50 mL/Hr) IV Continuous <Continuous>  dextrose 50% Injectable 25 Gram(s) IV Push once  dextrose 50% Injectable 12.5 Gram(s) IV Push once  diclofenac sodium 1% Gel 2 Gram(s) Topical every 6 hours  escitalopram 10 milliGRAM(s) Oral <User Schedule>  fentaNYL   Patch  12 MICROgram(s)/Hr 1 Patch Transdermal every 72 hours  fentaNYL   Patch  25 MICROgram(s)/Hr 1 Patch Transdermal every 72 hours  FIRST- Mouthwash  BLM 5 milliLiter(s) Swish and Spit every 8 hours  gabapentin Solution 250 milliGRAM(s) Oral <User Schedule>  glucagon  Injectable 1 milliGRAM(s) IntraMuscular once  hemorrhoidal Ointment 1 Application(s) Rectal at bedtime  influenza  Vaccine (HIGH DOSE) 0.5 milliLiter(s) IntraMuscular once  insulin glargine Injectable (LANTUS) 10 Unit(s) SubCutaneous <User Schedule>  insulin lispro (ADMELOG) corrective regimen sliding scale   SubCutaneous every 6 hours  insulin lispro Injectable (ADMELOG) 5 Unit(s) SubCutaneous every 6 hours  lactobacillus acidophilus 1 Tablet(s) Oral <User Schedule>  levothyroxine 125 MICROGram(s) Oral <User Schedule>  lidocaine   4% Patch 4 Patch Transdermal every 24 hours  nystatin Powder 1 Application(s) Topical every 8 hours  pantoprazole  Injectable 40 milliGRAM(s) IV Push every 12 hours  potassium chloride   Solution 40 milliEquivalent(s) Oral once  sodium chloride 1 Gram(s) Oral every 8 hours  sodium chloride 0.65% Nasal 1 Spray(s) Both Nostrils every 6 hours  triamcinolone 0.1% Oral Paste 1 Application(s) Topical every 8 hours  witch hazel Pads 1 Application(s) Topical every 12 hours    MEDICATIONS  (PRN):  acetaminophen   Oral Liquid .. 650 milliGRAM(s) Oral every 6 hours PRN Temp greater or equal to 38C (100.4F), Mild Pain (1 - 3)  aluminum hydroxide/magnesium hydroxide/simethicone Suspension 30 milliLiter(s) Enteral Tube every 4 hours PRN Dyspepsia  oxyCODONE    Solution 5 milliGRAM(s) Oral every 6 hours PRN Moderate Pain (4 - 6)      LABS:                        7.7    13.93 )-----------( 71       ( 22 Nov 2024 06:33 )             26.3     11-22    136  |  96  |  61[H]  ----------------------------<  143[H]  3.0[L]   |  19[L]  |  0.74    Ca    7.9[L]      22 Nov 2024 06:35  Phos  5.3     11-22  Mg     1.8     11-22    TPro  6.0  /  Alb  2.7[L]  /  TBili  0.9  /  DBili  x   /  AST  31  /  ALT  24  /  AlkPhos  132[H]  11-22      Mode: AC/ CMV (Assist Control/ Continuous Mandatory Ventilation)  RR (machine): 20  TV (machine): 400  FiO2: 40  PEEP: 5  ITime: 1  MAP: 13  PIP: 32

## 2024-11-22 NOTE — PROGRESS NOTE ADULT - SUBJECTIVE AND OBJECTIVE BOX
DIABETES FOLLOW UP NOTE: Saw pt earlier today    Chief Complaint: Endocrine consult requested for management of DM    INTERVAL HX: Pt stable, sleeping at time of visit and not waking up. Per staff and pvt care giver, pt is more alert today, able to nod yes/no to simple questions. Tolerating TFs of Casie Dickson at 40cc/hr with BG 100s to 200s requiring a total of  7 to 9 units of Admelog insulin q 6h. No hypoglycemia. Improving leukocytosis. On antibiotic.      Review of Systems:  General: As above  Unable      Allergies    walnut (Unknown)  metronidazole (Rash)  Lyrica (Unknown)  penicillin (Unknown)  Pineapple (Unknown)  Tagamet (Unknown)  heparin (Unknown)  Pecans (Unknown)  Hazelnut (Unknown)  meropenem (Rash)    Intolerances      MEDICATIONS:  ceftolozane/tazobactam IVPB 3000 milliGRAM(s) IV Intermittent every 8 hours  insulin glargine Injectable (LANTUS) 10 Unit(s) SubCutaneous <User Schedule>  insulin lispro (ADMELOG) corrective regimen sliding scale   SubCutaneous every 6 hours  insulin lispro Injectable (ADMELOG) 5 Unit(s) SubCutaneous every 6 hours  levothyroxine 125 MICROGram(s) Oral <User Schedule>  Hydrocortisone 25miligrams IV daily    PHYSICAL EXAM:  VITALS: T(C): 36.3 (11-22-24 @ 12:22)  T(F): 97.3 (11-22-24 @ 12:22), Max: 97.4 (11-21-24 @ 21:51)  HR: 97 (11-22-24 @ 12:22) (86 - 102)  BP: 190/82 (11-22-24 @ 12:22) (148/62 - 190/82)  RR:  (20 - 20)  SpO2:  (95% - 104%)  Wt(kg): --  GENERAL: Female laying in bed in NAD  HEENT: Trach in place connected to vent  Abdomen: Soft, nontender, non distended,   Extremities: Warm, no edema in all 4 exts  NEURO: Sleeping and not waking up when called    LABS:  POCT Blood Glucose.: 221 mg/dL (11-22-24 @ 14:06)  POCT Blood Glucose.: 205 mg/dL (11-22-24 @ 11:47)  POCT Blood Glucose.: 155 mg/dL (11-22-24 @ 05:33)  POCT Blood Glucose.: 175 mg/dL (11-21-24 @ 23:29)  POCT Blood Glucose.: 215 mg/dL (11-21-24 @ 17:19)  POCT Blood Glucose.: 201 mg/dL (11-21-24 @ 12:09)  POCT Blood Glucose.: 200 mg/dL (11-21-24 @ 05:50)  POCT Blood Glucose.: 175 mg/dL (11-21-24 @ 02:26)  POCT Blood Glucose.: 186 mg/dL (11-20-24 @ 23:49)  POCT Blood Glucose.: 197 mg/dL (11-20-24 @ 17:14)  POCT Blood Glucose.: 280 mg/dL (11-20-24 @ 11:32)  POCT Blood Glucose.: 289 mg/dL (11-20-24 @ 05:15)  POCT Blood Glucose.: 321 mg/dL (11-19-24 @ 23:35)  POCT Blood Glucose.: 366 mg/dL (11-19-24 @ 20:26)  POCT Blood Glucose.: 419 mg/dL (11-19-24 @ 17:44)                            7.7    13.93 )-----------( 71       ( 22 Nov 2024 06:33 )             26.3       11-22    136  |  96  |  61[H]  ----------------------------<  143[H]  3.0[L]   |  19[L]  |  0.74    eGFR: 86    Ca    7.9[L]      11-22  Mg     1.8     11-22  Phos  5.3     11-22    TPro  6.0  /  Alb  2.7[L]  /  TBili  0.9  /  DBili  x   /  AST  31  /  ALT  24  /  AlkPhos  132[H]  11-22       A1C with Estimated Average Glucose Result: 5.8 % (10-14-24 @ 05:08)      Estimated Average Glucose: 120 mg/dL (10-14-24 @ 05:08)

## 2024-11-22 NOTE — PROGRESS NOTE ADULT - PROBLEM SELECTOR PLAN 2
[] Sepsis  - Sputum cx 11/18: CRE PSA, Urine cx 11/18:E.facelis ( Likely colonized/ UA Unimpressive ), Bld cx 11/18: NGTD   - Hypotension resolved Midodrine dcd, Solu-cortef tapered to 25 mg IVP Q 8 HRS  - Continue Zerbaxa       [] PNA   - Sputum cx 10/8: PSA  - CXR 10/7: Opacification of the right middle lobe may represent pneumonia versus atelectasis  - CT A/P 10/18: ? Multifocal pneumonia  - Completed Initial course of cefepime on 10/12  - Sputum 11/4: PSA; Repeat CT C/A/P 11/6: Consolidative opacity LLL  - Cefepime restarted 11/5-->11/15; in setting of recurrent fevers  - CT CAP on 11/11 showed left greater than right basilar consolidation and patchy groundglass opacity in both lungs consistent with atelectasis and/or pneumonia.     [] C.Diff   - Stool C.diff: + 11/1   - S/p Flagyl 11/3-11/8  - Continue enteral Vanco [] Sepsis  - Sputum cx 11/18: CRE PSA, Urine cx 11/18:E.facelis ( Likely colonized/ UA Unimpressive ), Bld cx 11/18: NGTD   - Hypotension resolved Midodrine dcd, Solu-cortef tapered to 25 mg IVP Q 8 HRS  - Continue Zerbaxa       [] PNA   - Sputum cx 10/8: PSA  - CXR 10/7: Opacification of the right middle lobe may represent pneumonia versus atelectasis  - CT A/P 10/18: ? Multifocal pneumonia  - Completed Initial course of cefepime on 10/12  - Sputum 11/4: PSA; Repeat CT C/A/P 11/6: Consolidative opacity LLL  - Cefepime restarted 11/5-->11/15; in setting of recurrent fevers  - CT CAP on 11/11 showed left greater than right basilar consolidation and patchy groundglass opacity in both lungs consistent with atelectasis and/or pneumonia.     [] C.Diff   - Stool C.diff: + 11/1   - S/p Flagyl 11/3-11/8  - Oral Vanco 11/1-11/21

## 2024-11-22 NOTE — CHART NOTE - NSCHARTNOTEFT_GEN_A_CORE
NUTRITION FOLLOW UP NOTE    PATIENT SEEN FOR: follow up     SOURCE: [] Patient  [x] Current Medical Record  [x] Nursing  [] Family/support person at bedside  [x] Patient unavailable/inappropriate  [] Other:     CHART REVIEWED/EVENTS NOTED.  [] No changes to nutrition care plan to note  [x] Nutrition Status:  -PMH T2DM  -s/p trach (vent dependent) and chronic PEG   -s/p G-J tube exchange by GI on 10/21. J tube for meds and feeds per team  -s/p Endoscopic Peg adjustment .     Diet, NPO with Tube Feed:   Tube Feeding Modality: Jejunostomy  [employer] Peptide 1.5 ([id number])  Total Volume for 24 Hours (mL): 960  Continuous  Until Goal Tube Feed Rate (mL per Hour): 40  Tube Feed Duration (in Hours): 24  Tube Feed Start Time: 06:00  Free Water Flush  Pump   Rate (mL per Hour): 50   Frequency: Every Hour  Free Water Flush Instructions:  50ml free water flushes Q1hr  Alfred(7 Gm Arginine/7 Gm Glut/1.2 Gm HMB     Qty per Day:  2  No Carb Prosource (1pkg = 15gms Protein)     Qty per Day:  1  Banatrol TF     Qty per Day:  1/2 packet per daily (24 @ 10:25) [Active]    CURRENT DIET ORDER IS:  [] Appropriate:  [] Inadequate:  [x] Other: see below for recommendations     NUTRITION INTAKE/PROVISION:  [] PO:  [x] Enteral Nutrition: [employer] Peptide 1.5 40ml x 24 hours + Alfred BID + No carb Prosource daily + 1/2 packet Banatrol daily providing at 100% provision: 1768kcal (32.5kcal/kg), 92g protein (1.7g/kg) and 672ml free water.   -->Team changed tube feeds to 24 hours from 18 hours .   [] Parenteral Nutrition:    ANTHROPOMETRICS:  Drug Dosing Weight  Height (cm): 149.9 (21 Oct 2024 18:49)  Weight (kg): 54.4 (21 Oct 2024 18:49)  BMI (kg/m2): 24.2 (21 Oct 2024 18:49)  BSA (m2): 1.48 (21 Oct 2024 18:49)  10/16 62.5kg   Daily Weight in k.6 ()  Daily Weight in k.1 ()  RD will continue to monitor trends.     MEDICATIONS  (STANDING):  ascorbic acid  calcium carbonate 1250 mG  + Vitamin D (OsCal 500 + D)  ceftolozane/tazobactam IVPB  dextrose 5%.  dextrose 5%.  dextrose 50% Injectable  dextrose 50% Injectable  glucagon  Injectable  hydrocortisone sodium succinate Injectable  insulin glargine Injectable (LANTUS)  insulin lispro (ADMELOG) corrective regimen sliding scale  insulin lispro Injectable (ADMELOG)  levothyroxine  pantoprazole  Injectable  sodium chloride    Pertinent Labs:  @ 06:35: Na 136, BUN 61[H], Cr 0.74, [H], K+ 3.0[L], Phos 5.3[H], Mg 1.8, Alk Phos 132[H], ALT/SGPT 24, AST/SGOT 31, HbA1c --    A1C with Estimated Average Glucose Result: 5.8 % (10-14-24 @ 05:08)  A1C with Estimated Average Glucose Result: 6.4 % (24 @ 07:32)    Finger Sticks:  POCT Blood Glucose.: 155 mg/dL ( @ 05:33)  POCT Blood Glucose.: 175 mg/dL ( @ 23:29)  POCT Blood Glucose.: 215 mg/dL ( @ 17:19)  POCT Blood Glucose.: 201 mg/dL ( @ 12:09)    NUTRITIONALLY PERTINENT MEDICATIONS/LABS:  [x] Reviewed  [x] Relevant notes on medications/labs:   -synthroid ordered  -potassium low this morning   -phosphorus elevated this morning   -insulin regimen ordered to maintain glycemic control    EDEMA:  [x] Reviewed  [] Relevant notes:    GI/ I&O:  [x] Reviewed  [] Relevant notes:  [x] Other: c.diff positive, multiple lose stools daily. g tube to gravity drain    SKIN:   per flow sheets:  sacrum stage IV   left fifth toe suspected deep tissue injury   left and right hallux medial suspected deep tissue injury    ESTIMATED NEEDS:  Based on: dosing weight 54.4kg   30-35kcal/kg 1632-1904kcal/day  1.4-1.8g/kg 76-98g protein/day  defer fluid needs to team     NUTRITION DIAGNOSIS:  [x] Prior Dx: increased nutrient needs  [] New Dx:    EDUCATION:  [] Yes:  [x] Not appropriate/warranted    NUTRITION CARE PLAN:  1. Diet: continue [employer] Peptide 1.5 per team for a total volume of 960ml. Rate/hr deferred to team. Can continue banatrol to aid in stool bulking, prosource to aid in added protein/calories for wound healing and Alfred to aid in wound healing.   -->Insulin regimen deferred to team/endocrinology   -->Defer free water flush to team    2. Continue vitamin/mineral regimen as ordered per team     [] Achieved - Continue current nutrition intervention(s)  [] Current medical condition precludes nutrition intervention at this time.    MONITORING AND EVALUATION:   RD remains available upon request and will follow up per protocol.    Rufina Morris, MS, RD, CDN / Teams

## 2024-11-22 NOTE — PROGRESS NOTE ADULT - ASSESSMENT
75 yo F PMH T2DM on insulin, HTN, HLD, hypothyroidism, RA on Prednisone, Fibromyalgia, cerebral aneurysm s/p repair, chronic hypercapnic respiratory failure s/p trach (vent dependent) and chronic PEG 2022, recurrent C. diff infection (follows with Dr. Newman), persistent GJ tube leaks now s/p GC fistula repair 8/12 BIBEMS on 10/7 with daughter for respiratory distress, hypoxia to 88%.  Pt also noted to have rectal bleeding week prior to presentation requiring transfusion 5 days ago in ED as per daughter. Daughter also reports brownish discharge from G-J tube. In ED, pt afebrile, fiO2 96-98% on 40% on ventilator.  Chest X-ray w/ opacification of right lung concerning for possible PNA.  Admitted to RCU for further management. Sputum cxs grew PSA; treated with course of Cefepime. Patient with decreased H+H received 1 unit of PRBCS on 10/10.  10/14 EGD w/ Dr. Rosales for PEGJ exchange and clippings placed for closure of fistula site.  Persistent fevers treated with meropenem and vanc (d/c'd 10/20).  CT A/P without infectious source.  Continued fevers and persistent leukocytosis 11/1 CDiff positive - po vanco started, IV flagyl added (11/3-11/8). Head CT performed on 11/6 due to concern of lethargy no acute intracranial findings were noted; likely related to metabolic encephalopathy. Patient continued to have persistent fevers and was empirically treated with Cefepime (11/5-11/9) for CR PSA in sputum cx. CT C/A/P performed 11/6 c/w consolidative opacity LLL. Evening of 11/10-11/11, WBC 15 --> 21, procal 1 --> 43, lactate 3.2 -- pt also febrile. Infectious w/u sent 11/10 -- BCx negative, trach aspirate cx pending, UA negative. CT CAP on 11/11 showed left greater than right basilar consolidation and patchy ground glass opacity in both lungs consistent with atelectasis and/or pneumonia; Feeding tube coils in the stomach with tip within the first portion of the duodenum, No bowel obstruction. Cefepime restarted 11/11-11/15 with improvement in leukocytosis. Patient underwent Endoscopic adjustment of peg 11/13 with Dr. Rosales. XR tube check performed. Patient became Septic again on 11/19 requiring Midodrine, Solucortef and HC03 drip; Patient was empirically placed on Zerbaxa.     11/22: No events reported overnight. Ongoing GOC Discussions; MOLST Updated yesterday evening to state No pressors and No transfer to ICU if indicated. Improved lactate, procalcitonin and awareness.  Answering yes/No question barely.  Patients cxs from 11/18; Blood cx: NGTD, Sputum cx: CRE PSA( sensitive to Cef/sloan) and Urine cx growing E.facelis ( likely colonized as UA Negative for WBC, Nitrates and only trace Leuks). Case d/w ID will continue Cef/ Sloan in the meantime. Became hypertensive, O2 sat's dropped to 90%, likely flashed, gave hydralazine, albumin and Lasix. 75 yo F PMH T2DM on insulin, HTN, HLD, hypothyroidism, RA on Prednisone, Fibromyalgia, cerebral aneurysm s/p repair, chronic hypercapnic respiratory failure s/p trach (vent dependent) and chronic PEG 2022, recurrent C. diff infection (follows with Dr. Newman), persistent GJ tube leaks now s/p GC fistula repair 8/12 BIBEMS on 10/7 with daughter for respiratory distress, hypoxia to 88%.  Pt also noted to have rectal bleeding week prior to presentation requiring transfusion 5 days ago in ED as per daughter. Daughter also reports brownish discharge from G-J tube. In ED, pt afebrile, fiO2 96-98% on 40% on ventilator.  Chest X-ray w/ opacification of right lung concerning for possible PNA.  Admitted to RCU for further management. Sputum cxs grew PSA; treated with course of Cefepime. Patient with decreased H+H received 1 unit of PRBCS on 10/10.  10/14 EGD w/ Dr. Rosales for PEGJ exchange and clippings placed for closure of fistula site.  Persistent fevers treated with meropenem and vanc (d/c'd 10/20).  CT A/P without infectious source.  Continued fevers and persistent leukocytosis 11/1 CDiff positive - po vanco started, IV flagyl added (11/3-11/8). Head CT performed on 11/6 due to concern of lethargy no acute intracranial findings were noted; likely related to metabolic encephalopathy. Patient continued to have persistent fevers and was empirically treated with Cefepime (11/5-11/9) for CR PSA in sputum cx. CT C/A/P performed 11/6 c/w consolidative opacity LLL. Evening of 11/10-11/11, WBC 15 --> 21, procal 1 --> 43, lactate 3.2 -- pt also febrile. Infectious w/u sent 11/10 -- BCx negative, trach aspirate cx pending, UA negative. CT CAP on 11/11 showed left greater than right basilar consolidation and patchy ground glass opacity in both lungs consistent with atelectasis and/or pneumonia; Feeding tube coils in the stomach with tip within the first portion of the duodenum, No bowel obstruction. Cefepime restarted 11/11-11/15 with improvement in leukocytosis. Patient underwent Endoscopic adjustment of peg 11/13 with Dr. Rosales. XR tube check performed. Patient became Septic again on 11/19 requiring Midodrine, Solucortef and HC03 drip; Patient was empirically placed on Zerbaxa.     11/22: No events reported overnight. Ongoing GOC Discussions; MOLST: DNR, No pressors and No transfer to ICU if indicated. Improved lactate, procalcitonin and awareness. Patients cxs from 11/18; Blood cx: NGTD, Sputum cx: CRE PSA( sensitive to Cef/sloan) and Urine cx growing E. faecalis.  As per ID will continue Cef/ Sloan in the meantime.

## 2024-11-22 NOTE — PROGRESS NOTE ADULT - ASSESSMENT
73 yo F w/h/o controlled T2DM (A1C 5.8%) on insulin at home. Also HTN, HLD, hypothyroidism, RA on Prednisone, Fibromyalgia, cerebral aneurysm s/p repair, chronic hypercapnic respiratory failure s/p trach (vent dependent) and chronic PEG 2022, recurrent C. diff infection (follows with Dr. 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 S/p antibiotics and s/p GJ tube exchanges on 10/14, s/p PEG replacement 10/21. Endocrine consulted for Type 2 DM management while on tube feeding. Patient tolerating 24hr TFs at goal rate with BG 100s to 200s while on present insulin doses. On contact isolation for  C diff. No hypoglycemia. OFF D5 IV fluids. Will increase Admelog dose q6h to keep BG goal 100-180mg/dL inpatient.       Home DM medications: Lantus 35 units at HS, Humalog 26 units with # 1 feeding, 22 units with #2 tube feeding, 20 units with #3 tube feeding and  Humalog correction scale (  2 units for -294, 4 units for -250, 6 units for -300, 8units for -350, 10 units for -400, 12 units for -450)   while on [employer] formula 3 cans/day, slow bolus( run at 80 ml/hr total volume 960 ml/day> 1st can around 6:30-8:30, 2nd can around 3 pm, 3rd can around 8-9 pm) plus Banatrol 1/2 packet BID, was increasing to 1 packet BID, plus Kefir 10 oz/day ( divided in multiple times throughout the day).

## 2024-11-22 NOTE — PROGRESS NOTE ADULT - PROBLEM SELECTOR PLAN 1
- Please monitor blood glucose values q 6 hours while on tube feeding or NPO  - Continue Lantus to 10u QAM  - Increase Admelog to 7 u q 6 hours while on 24 continuous TFs ( HOLD if tube feeds held )   - Change Admelog correction scale q 6 hours to low dose while on 24 hour tube feeding   - Please inform Endocrine team for any changes in tube feeding,  steroid  - Please keep all IV abx in NS solution if possible!  Discharge Plan:  - TBD depending on insulin requirement and discharge tube feeding regimen (Bolus vs continuous tube feeding)   - If bolus tube feeding > Lantus plus Humalog   - Patient should have BGs checked 4x a day while on TFs.    Daughter aware to contact Endocrinologist if BG <70mg/dL x1 or >200mg/dL consistently or >400mg/dL x1.   - Followup with Dr Ruth: [facility]: [address] [place] [address]. [address]. [place] [address]. Tel: [phone]- Telemedicine- daughter to schedule.   - Make sure pt has Rx for all DM supplies and insulin

## 2024-11-23 NOTE — PROGRESS NOTE ADULT - SUBJECTIVE AND OBJECTIVE BOX
Patient is a 74y old  Female who presents with a chief complaint of Patient admitted with Hypoxemia and episode of Bright red blood per rectum (22 Nov 2024 18:16)    HPI:  73 yo F PMH T2DM on insulin, HTN, HLD, hypothyroidism, RA on Prednisone, Fibromyalgia, cerebral aneurysm s/p repair, chronic hypercapnic respiratory failure s/p trach (vent dependent) and chronic PEG 2022, recurrent C. diff infection (follows with Dr. Newman), persistent GJ tube leaks now s/p GC fistula repair 8/12 BIBEMS with daughter for respiratory distress, hypoxia to high 80s.  Pt also noted to have rectal bleeding this past week requiring transfusion 5 days ago in ED as per daughter.  In ED, pt afebrile, fiO2 96-98% on 40% on ventilator.  Chest Xray w/ opacification of right lung concerning for possible PNA.  Admitted to RCU for further management.     (07 Oct 2024 18:58)    Interval Events:    REVIEW OF SYSTEMS:  [ ] Positive  [ ] All other systems negative  [ ] Unable to assess ROS because ________    Vital Signs Last 24 Hrs  T(C): 35.4 (11-23-24 @ 07:30), Max: 36.3 (11-22-24 @ 12:22)  T(F): 95.7 (11-23-24 @ 07:30), Max: 97.3 (11-22-24 @ 12:22)  HR: 99 (11-23-24 @ 11:24) (75 - 104)  BP: 146/69 (11-23-24 @ 04:55) (146/69 - 190/82)  RR: 20 (11-23-24 @ 04:55) (16 - 20)  SpO2: 99% (11-23-24 @ 11:24) (95% - 100%)    PHYSICAL EXAM:  HEENT:   [ ]Tracheostomy:  [ ]Pupils equal  [ ]No oral lesions  [ ]Abnormal    SKIN  [ ] No Rash  [ ] Abnormal  [ ] pressure    CARDIAC  [ ]Regular  [ ]Abnormal    PULMONARY  [ ]Bilateral Clear Breath Sounds  [ ]Normal Excursion  [ ]Abnormal    GI  [ ]PEG      [ ] +BS		              [ ]Soft, nondistended, nontender	  [ ]Abnormal    MUSCULOSKELETAL                                   [ ]Bedbound                 [ ]Abnormal    [ ]Ambulatory/OOB to chair                           EXTREMITIES                                         [ ]Normal  [ ]Edema                           NEUROLOGIC  [ ] Normal, non focal  [ ] Focal findings:    PSYCHIATRIC  [ ]Alert and appropriate  [ ] Sedated	 [ ]Agitated    :  Mcbride: [ ] Yes, if yes: Date of Placement:                   [  ] No    LINES: Central Lines [ ] Yes, if yes: Date of Placement                                     [  ] No    HOSPITAL MEDICATIONS:  MEDICATIONS  (STANDING):  albuterol/ipratropium for Nebulization 3 milliLiter(s) Nebulizer every 6 hours  artificial  tears Solution 1 Drop(s) Both EYES every 6 hours  ascorbic acid 500 milliGRAM(s) Oral daily  Biotene Dry Mouth Oral Rinse 5 milliLiter(s) Swish and Spit every 6 hours  calcium carbonate 1250 mG  + Vitamin D (OsCal 500 + D) 1 Tablet(s) Oral <User Schedule>  ceftolozane/tazobactam IVPB 3000 milliGRAM(s) IV Intermittent every 8 hours  chlorhexidine 0.12% Liquid 15 milliLiter(s) Oral Mucosa every 12 hours  chlorhexidine 4% Liquid 1 Application(s) Topical daily  dextrose 5%. 1000 milliLiter(s) (100 mL/Hr) IV Continuous <Continuous>  dextrose 5%. 1000 milliLiter(s) (50 mL/Hr) IV Continuous <Continuous>  dextrose 50% Injectable 25 Gram(s) IV Push once  dextrose 50% Injectable 12.5 Gram(s) IV Push once  diclofenac sodium 1% Gel 2 Gram(s) Topical every 6 hours  escitalopram 10 milliGRAM(s) Oral <User Schedule>  fentaNYL   Patch  12 MICROgram(s)/Hr 1 Patch Transdermal every 72 hours  fentaNYL   Patch  25 MICROgram(s)/Hr 1 Patch Transdermal every 72 hours  FIRST- Mouthwash  BLM 5 milliLiter(s) Swish and Spit every 8 hours  gabapentin Solution 250 milliGRAM(s) Oral <User Schedule>  glucagon  Injectable 1 milliGRAM(s) IntraMuscular once  hemorrhoidal Ointment 1 Application(s) Rectal at bedtime  hydrALAZINE Injectable 10 milliGRAM(s) IV Push every 8 hours  hydrocortisone sodium succinate Injectable 25 milliGRAM(s) IV Push daily  influenza  Vaccine (HIGH DOSE) 0.5 milliLiter(s) IntraMuscular once  insulin glargine Injectable (LANTUS) 10 Unit(s) SubCutaneous <User Schedule>  insulin lispro (ADMELOG) corrective regimen sliding scale   SubCutaneous every 6 hours  insulin lispro Injectable (ADMELOG) 7 Unit(s) SubCutaneous every 6 hours  lactobacillus acidophilus 1 Tablet(s) Oral <User Schedule>  levothyroxine 125 MICROGram(s) Oral <User Schedule>  lidocaine   4% Patch 4 Patch Transdermal every 24 hours  nystatin Powder 1 Application(s) Topical every 8 hours  pantoprazole  Injectable 40 milliGRAM(s) IV Push every 12 hours  potassium chloride   Solution 40 milliEquivalent(s) Oral every 4 hours  potassium chloride  10 mEq/100 mL IVPB 10 milliEquivalent(s) IV Intermittent every 1 hour  sodium chloride 1 Gram(s) Oral every 8 hours  sodium chloride 0.65% Nasal 1 Spray(s) Both Nostrils every 6 hours  triamcinolone 0.1% Oral Paste 1 Application(s) Topical every 8 hours  witch hazel Pads 1 Application(s) Topical every 12 hours    MEDICATIONS  (PRN):  acetaminophen   Oral Liquid .. 650 milliGRAM(s) Oral every 6 hours PRN Temp greater or equal to 38C (100.4F), Mild Pain (1 - 3)  aluminum hydroxide/magnesium hydroxide/simethicone Suspension 30 milliLiter(s) Enteral Tube every 4 hours PRN Dyspepsia  oxyCODONE    Solution 5 milliGRAM(s) Oral every 6 hours PRN Moderate Pain (4 - 6)      LABS:                        8.0    14.68 )-----------( 74       ( 23 Nov 2024 07:15 )             27.0     11-23    140  |  101  |  58[H]  ----------------------------<  98  2.9[LL]   |  19[L]  |  0.65    Ca    8.2[L]      23 Nov 2024 07:17  Phos  4.2     11-23  Mg     1.6     11-23    TPro  6.2  /  Alb  2.7[L]  /  TBili  0.9  /  DBili  x   /  AST  27  /  ALT  22  /  AlkPhos  114  11-23      Mode: AC/ CMV (Assist Control/ Continuous Mandatory Ventilation)  RR (machine): 20  TV (machine): 400  FiO2: 40  PEEP: 5  ITime: 1  MAP: 15  PIP: 35   Patient is a 74y old  Female who presents with a chief complaint of Patient admitted with Hypoxemia and episode of Bright red blood per rectum (22 Nov 2024 18:16)    HPI:  75 yo F PMH T2DM on insulin, HTN, HLD, hypothyroidism, RA on Prednisone, Fibromyalgia, cerebral aneurysm s/p repair, chronic hypercapnic respiratory failure s/p trach (vent dependent) and chronic PEG 2022, recurrent C. diff infection (follows with Dr. Newman), persistent GJ tube leaks now s/p GC fistula repair 8/12 BIBEMS with daughter for respiratory distress, hypoxia to high 80s.  Pt also noted to have rectal bleeding this past week requiring transfusion 5 days ago in ED as per daughter.  In ED, pt afebrile, fiO2 96-98% on 40% on ventilator.  Chest Xray w/ opacification of right lung concerning for possible PNA.  Admitted to RCU for further management.   (07 Oct 2024 18:58)    Interval Events: No issues overnight    REVIEW OF SYSTEMS:  [ ] Positive  [ ] All other systems negative  [x ] Unable to assess ROS because patient is non-responsive    Vital Signs Last 24 Hrs  T(C): 35.4 (11-23-24 @ 07:30), Max: 36.3 (11-22-24 @ 12:22)  T(F): 95.7 (11-23-24 @ 07:30), Max: 97.3 (11-22-24 @ 12:22)  HR: 99 (11-23-24 @ 11:24) (75 - 104)  BP: 146/69 (11-23-24 @ 04:55) (146/69 - 190/82)  RR: 20 (11-23-24 @ 04:55) (16 - 20)  SpO2: 99% (11-23-24 @ 11:24) (95% - 100%)    PHYSICAL EXAM:  HEENT:   [X] Tracheostomy: #8 distal XLT cuffed Shiley   [X] PERRLA  [ ] No oral lesions  [ ] Abnormal    SKIN  [ ] No Rash  [ ] Abnormal  [X] pressure + Sacral Wound    CARDIAC  [X] Regular  [ ] Abnormal    PULMONARY  [ ] Bilateral Clear Breath Sounds  [ ] Normal Excursion  [X] Abnormal - Diffuses coarse breath sounds / decreased at bases bilaterally     GI  [X] PEG site covered w/ dry dressing              [X] Soft, nondistended, nontender	  [X] Abnormal; old PEG stoma site with small opening c/d/i,     MUSCULOSKELETAL                                   [X] Bedbound                 [ ] Abnormal    [ ] Ambulatory/OOB to chair                           EXTREMITIES                                         [ ] Normal  [X] Edema - Anasarca                        NEUROLOGIC  [ ] Normal, non focal  [X] Focal findings: Obtunded, Minimally unresponsive to voice, grimacing occasionally, not following commands, no purposeful movements in all extremities, withdraws to noxious stimuli.    PSYCHIATRIC  [x ] Unable to assess  [] Sedated	 [ ]Agitated    :  Mcbride: [ ] Yes, if yes: Date of Placement:                   [X] No    LINES: Central Lines [ ] Yes, if yes: Date of Placement                                     [X] No      HOSPITAL MEDICATIONS:  MEDICATIONS  (STANDING):  albuterol/ipratropium for Nebulization 3 milliLiter(s) Nebulizer every 6 hours  artificial  tears Solution 1 Drop(s) Both EYES every 6 hours  ascorbic acid 500 milliGRAM(s) Oral daily  Biotene Dry Mouth Oral Rinse 5 milliLiter(s) Swish and Spit every 6 hours  calcium carbonate 1250 mG  + Vitamin D (OsCal 500 + D) 1 Tablet(s) Oral <User Schedule>  ceftolozane/tazobactam IVPB 3000 milliGRAM(s) IV Intermittent every 8 hours  chlorhexidine 0.12% Liquid 15 milliLiter(s) Oral Mucosa every 12 hours  chlorhexidine 4% Liquid 1 Application(s) Topical daily  dextrose 5%. 1000 milliLiter(s) (100 mL/Hr) IV Continuous <Continuous>  dextrose 5%. 1000 milliLiter(s) (50 mL/Hr) IV Continuous <Continuous>  dextrose 50% Injectable 25 Gram(s) IV Push once  dextrose 50% Injectable 12.5 Gram(s) IV Push once  diclofenac sodium 1% Gel 2 Gram(s) Topical every 6 hours  escitalopram 10 milliGRAM(s) Oral <User Schedule>  fentaNYL   Patch  12 MICROgram(s)/Hr 1 Patch Transdermal every 72 hours  fentaNYL   Patch  25 MICROgram(s)/Hr 1 Patch Transdermal every 72 hours  FIRST- Mouthwash  BLM 5 milliLiter(s) Swish and Spit every 8 hours  gabapentin Solution 250 milliGRAM(s) Oral <User Schedule>  glucagon  Injectable 1 milliGRAM(s) IntraMuscular once  hemorrhoidal Ointment 1 Application(s) Rectal at bedtime  hydrALAZINE Injectable 10 milliGRAM(s) IV Push every 8 hours  hydrocortisone sodium succinate Injectable 25 milliGRAM(s) IV Push daily  influenza  Vaccine (HIGH DOSE) 0.5 milliLiter(s) IntraMuscular once  insulin glargine Injectable (LANTUS) 10 Unit(s) SubCutaneous <User Schedule>  insulin lispro (ADMELOG) corrective regimen sliding scale   SubCutaneous every 6 hours  insulin lispro Injectable (ADMELOG) 7 Unit(s) SubCutaneous every 6 hours  lactobacillus acidophilus 1 Tablet(s) Oral <User Schedule>  levothyroxine 125 MICROGram(s) Oral <User Schedule>  lidocaine   4% Patch 4 Patch Transdermal every 24 hours  nystatin Powder 1 Application(s) Topical every 8 hours  pantoprazole  Injectable 40 milliGRAM(s) IV Push every 12 hours  potassium chloride   Solution 40 milliEquivalent(s) Oral every 4 hours  potassium chloride  10 mEq/100 mL IVPB 10 milliEquivalent(s) IV Intermittent every 1 hour  sodium chloride 1 Gram(s) Oral every 8 hours  sodium chloride 0.65% Nasal 1 Spray(s) Both Nostrils every 6 hours  triamcinolone 0.1% Oral Paste 1 Application(s) Topical every 8 hours  witch hazel Pads 1 Application(s) Topical every 12 hours    MEDICATIONS  (PRN):  acetaminophen   Oral Liquid .. 650 milliGRAM(s) Oral every 6 hours PRN Temp greater or equal to 38C (100.4F), Mild Pain (1 - 3)  aluminum hydroxide/magnesium hydroxide/simethicone Suspension 30 milliLiter(s) Enteral Tube every 4 hours PRN Dyspepsia  oxyCODONE    Solution 5 milliGRAM(s) Oral every 6 hours PRN Moderate Pain (4 - 6)      LABS:                        8.0    14.68 )-----------( 74       ( 23 Nov 2024 07:15 )             27.0     11-23    140  |  101  |  58[H]  ----------------------------<  98  2.9[LL]   |  19[L]  |  0.65    Ca    8.2[L]      23 Nov 2024 07:17  Phos  4.2     11-23  Mg     1.6     11-23    TPro  6.2  /  Alb  2.7[L]  /  TBili  0.9  /  DBili  x   /  AST  27  /  ALT  22  /  AlkPhos  114  11-23      Mode: AC/ CMV (Assist Control/ Continuous Mandatory Ventilation)  RR (machine): 20  TV (machine): 400  FiO2: 40  PEEP: 5  ITime: 1  MAP: 15  PIP: 35

## 2024-11-23 NOTE — PROGRESS NOTE ADULT - PROBLEM SELECTOR PROBLEM 8
Patient asking if Dr. Haro received the MRI from [facility] in [place]. Patient had mri on 10/23. Please call patient to discuss results.     Hypothyroid

## 2024-11-23 NOTE — CHART NOTE - NSCHARTNOTEFT_GEN_A_CORE
Spoke with Dental Resident Abner regarding the concerns of daughter who states that mothers teeth were not shaking, now lower and upper tooth is shaking.  Dental note from 11/18 did not reveal mobility  Dental will stop by today to see patient.    Will endorse to primary day team, attending to follow  Noreen Hurtado NP  [facility] [address]

## 2024-11-23 NOTE — PROVIDER CONTACT NOTE (CRITICAL VALUE NOTIFICATION) - BACKGROUND
Sputum culture completed 11/10/24
Pt admitting dx: respiratory failure
pt had low fs this morning per night shift susana lyons. pt treated with dextrose 50. glucose improved ot normal level
No known bleeding
Patient is a 74y old  Female who presents with a chief complaint of Patient admitted with Hypoxemia and episode of Bright red blood per rectum
Pt admitted for ARDS
AM labs
dx: ARDS
previous hgb 7.7, pt hypotensive since 00:00 vitals
NP aware

## 2024-11-23 NOTE — PROGRESS NOTE ADULT - ASSESSMENT
75 yo F PMH T2DM on insulin, HTN, HLD, hypothyroidism, RA on Prednisone, Fibromyalgia, cerebral aneurysm s/p repair, chronic hypercapnic respiratory failure s/p trach (vent dependent) and chronic PEG 2022, recurrent C. diff infection (follows with Dr. Newman), persistent GJ tube leaks now s/p GC fistula repair 8/12 BIBEMS on 10/7 with daughter for respiratory distress, hypoxia to 88%.  Pt also noted to have rectal bleeding week prior to presentation requiring transfusion 5 days ago in ED as per daughter. Daughter also reports brownish discharge from G-J tube. In ED, pt afebrile, fiO2 96-98% on 40% on ventilator.  Chest X-ray w/ opacification of right lung concerning for possible PNA.  Admitted to RCU for further management. Sputum cxs grew PSA; treated with course of Cefepime. Patient with decreased H+H received 1 unit of PRBCS on 10/10.  10/14 EGD w/ Dr. Rosales for PEGJ exchange and clippings placed for closure of fistula site.  Persistent fevers treated with meropenem and vanc (d/c'd 10/20).  CT A/P without infectious source.  Continued fevers and persistent leukocytosis 11/1 CDiff positive - po vanco started, IV flagyl added (11/3-11/8). Head CT performed on 11/6 due to concern of lethargy no acute intracranial findings were noted; likely related to metabolic encephalopathy. Patient continued to have persistent fevers and was empirically treated with Cefepime (11/5-11/9) for CR PSA in sputum cx. CT C/A/P performed 11/6 c/w consolidative opacity LLL. Evening of 11/10-11/11, WBC 15 --> 21, procal 1 --> 43, lactate 3.2 -- pt also febrile. Infectious w/u sent 11/10 -- BCx negative, trach aspirate cx pending, UA negative. CT CAP on 11/11 showed left greater than right basilar consolidation and patchy ground glass opacity in both lungs consistent with atelectasis and/or pneumonia; Feeding tube coils in the stomach with tip within the first portion of the duodenum, No bowel obstruction. Cefepime restarted 11/11-11/15 with improvement in leukocytosis. Patient underwent Endoscopic adjustment of peg 11/13 with Dr. Rosales. XR tube check performed. Patient became Septic again on 11/19 requiring Midodrine, Solucortef and HC03 drip; Patient was empirically placed on Zerbaxa.     11/23: No events reported overnight. Ongoing GOC Discussions; MOLST: DNR, No pressors and No transfer to ICU if indicated. Improved lactate, procalcitonin and awareness. Patients cxs from 11/18; Blood cx: NGTD, Sputum cx: CRE PSA( sensitive to Cef/sloan) and Urine cx growing E. faecalis.  As per ID will continue Cef/Sloan for short course. Steroids decreased to 1x/day. 73 yo F PMH T2DM on insulin, HTN, HLD, hypothyroidism, RA on Prednisone, Fibromyalgia, cerebral aneurysm s/p repair, chronic hypercapnic respiratory failure s/p trach (vent dependent) and chronic PEG 2022, recurrent C. diff infection (follows with Dr. Newman), persistent GJ tube leaks now s/p GC fistula repair 8/12 BIBEMS on 10/7 with daughter for respiratory distress, hypoxia to 88%.  Pt also noted to have rectal bleeding week prior to presentation requiring transfusion 5 days ago in ED as per daughter. Daughter also reports brownish discharge from G-J tube. In ED, pt afebrile, fiO2 96-98% on 40% on ventilator.  Chest X-ray w/ opacification of right lung concerning for possible PNA.  Admitted to RCU for further management. Sputum cxs grew PSA; treated with course of Cefepime. Patient with decreased H+H received 1 unit of PRBCS on 10/10.  10/14 EGD w/ Dr. Rosales for PEGJ exchange and clippings placed for closure of fistula site.  Persistent fevers treated with meropenem and vanc (d/c'd 10/20).  CT A/P without infectious source.  Continued fevers and persistent leukocytosis 11/1 CDiff positive - po vanco started, IV flagyl added (11/3-11/8). Head CT performed on 11/6 due to concern of lethargy no acute intracranial findings were noted; likely related to metabolic encephalopathy. Patient continued to have persistent fevers and was empirically treated with Cefepime (11/5-11/9) for CR PSA in sputum cx. CT C/A/P performed 11/6 c/w consolidative opacity LLL. Evening of 11/10-11/11, WBC 15 --> 21, procal 1 --> 43, lactate 3.2 -- pt also febrile. Infectious w/u sent 11/10 -- BCx negative, trach aspirate cx pending, UA negative. CT CAP on 11/11 showed left greater than right basilar consolidation and patchy ground glass opacity in both lungs consistent with atelectasis and/or pneumonia; Feeding tube coils in the stomach with tip within the first portion of the duodenum, No bowel obstruction. Cefepime restarted 11/11-11/15 with improvement in leukocytosis. Patient underwent Endoscopic adjustment of peg 11/13 with Dr. Rosales. XR tube check performed. Patient became Septic again on 11/19 requiring Midodrine, Solucortef and HC03 drip; Patient was empirically placed on Zerbaxa.     11/23: No events reported overnight. Ongoing GOC Discussions; MOLST: DNR, No pressors and No transfer to ICU if indicated. Improved lactate, procalcitonin and awareness. Patients cxs from 11/18; Blood cx: NGTD, Sputum cx: CRE PSA( sensitive to Cef/sloan) and Urine cx growing E. faecalis.  As per ID will continue Cef/Sloan for short course. Steroids decreased to 1x/day. Titrate to off.

## 2024-11-23 NOTE — PROVIDER CONTACT NOTE (CRITICAL VALUE NOTIFICATION) - PERSON GIVING RESULT:
Joe Arango/Phlebotomy
Lab: Joe Arango
kenny saldivar
Joe saldivar
Keegan Orourke
Keegan Orourke, lab
karis Byrd
supriya sanchez
Joanna Orourek Stat Lab
Yohannes Qureshi from [facility]
Gabriel Stephens- Lab
Saige Maria/ Laboratory
Gene Lares from [facility]
Gabriel Stephens (lab)

## 2024-11-23 NOTE — PROGRESS NOTE ADULT - NS ATTEND AMEND GEN_ALL_CORE FT
75 yo F w/ PMH DM2 (insulin-dependent), HTN, HLD, hypothyroidism, RA on Prednisone, fibromyalgia, cerebral aneurysm s/p repair, chronic hypoxemia and hypercapnic respiratory failure s/p trach, vent-dependent, recurrent C diff infection, oropharyngeal dysphagia s/p G-J tube with persistent GJ tube leaks now s/p GC fistula repair 8/12, and recent presentation 5 days PTA for rectal bleeding requiring transfusion in the ED who now presents with acute hypoxemic respiratory distress 2/2 RML PNA, admitted to the RCU for further management. Found to have Pseudomonas aeruginosa in sputum culture and urine culture with ESBL E. coli s/p course of meropenem. Course complicated by antibiotic-associated diarrhea.     - obtunded, intermiittently opens eyes, overall picture c/w septic encephelopathy   - remains MV dependent  - cont Duonebs + 3% q6h for ACT  - improved sepsis w/ Zerbaxa as sputum cx now w/ more resistant PsA, f/u ID recs    - dental eval performed to eval oral source for infection and felt to not be the source of sepsis  - cont monitoring blood gas and clinically  - BP has been labile, off midodrine now, elevated BP this morning, started Hydralazine  - c/w intermittent diuresis given significant edema  - monitor i/o and now off bicarb gtt given ph   - Anemia multifactorial, hemorrhoids, AOCD. s/p EGD without active bleeding, transfuse for Hb < 7  - cont to monitor FS, cont tapering stress dose steroids  (for RA on home prednisone 5 mg daily )  - cont wound care to sacrum for decub    - DVT ppx- only scd for now given significant anemia a few days ago  Prognosis guarded

## 2024-11-23 NOTE — PROVIDER CONTACT NOTE (CRITICAL VALUE NOTIFICATION) - TEST AND RESULT REPORTED:
cbc, hemoglobin 7.0
lactate-7
Blood Culture drawn 11/01/2024, prelim, growth in anaerobic bottle gram positive cocci in cluster
Hgb 6.6
Hemoglobin 6.9
PH 7.20, bicarb9, C02 10, Lactate 8.1
critical serum glucose 45
H&H 6.4/19
HgB 6.8
potassium 2.4
serum potassium level 2.9
ABG result done @ 11/18/24 2015
Hgb = 6.9
Sputum culture completed 11/10/24 positive for pseudomonas aeruginosa, carbapenem resistant. Commensal vinay consistent with body site.

## 2024-11-23 NOTE — CHART NOTE - NSCHARTNOTEFT_GEN_A_CORE
Reviewed BG values over past 24 hours. Improved w/glucose values in 100s today. If tightly controlled would recommend reducing insulin but can monitor on current doses for time being.      Problem/Plan - 1:  ·  Problem: Type 2 diabetes mellitus with hyperglycemia.   ·  Plan: - Please monitor blood glucose values q 6 hours while on tube feeding or NPO  - Continue Lantus to 10u QAM  - Continue Admelog 7 u q 6 hours while on 24 continuous TFs ( HOLD if tube feeds held ). If BG less than 100mg/dl would reduce to 6 units q6h  - Change Admelog correction scale q 6 hours to low dose while on 24 hour tube feeding   - Please inform Endocrine team for any changes in tube feeding,  steroid  - Please keep all IV abx in NS solution if possible!  Discharge Plan:  - TBD depending on insulin requirement and discharge tube feeding regimen (Bolus vs continuous tube feeding)   - If bolus tube feeding > Lantus plus Humalog   - Patient should have BGs checked 4x a day while on TFs.    Daughter aware to contact Endocrinologist if BG <70mg/dL x1 or >200mg/dL consistently or >400mg/dL x1.   - Followup with Dr Ruth: [facility]: [address]. [place] [address]. Tel: [phone]- Telemedicine- daughter to schedule.   - Make sure pt has Rx for all DM supplies and insulin.    Any inquiries please email [email or web address]   office:  [phone] (M-F 9a-5pm)               [phone] (nights/weekends)   Can access [employer] coverage via sunrise/tools

## 2024-11-23 NOTE — PROVIDER CONTACT NOTE (CRITICAL VALUE NOTIFICATION) - ACTION/TREATMENT ORDERED:
1 unit of PRBC ordered
No changes to treatment plan at this time.
fs repeated and not critically low. no further intervention per pa
no change in treatment, discussed with family
Endorsed to BERNA Rome
PRITI Lawson notified verbally that serum potassium level 2.9.
PRITI leroy requested redraw
Sputum culture completed 11/10/24 positive for pseudomonas aeruginosa, carbapenem resistant. Commensal vinay consistent with body site. NP Mayra Lawson notified verbally of all results. No new orders given presently.
Potassium supplement
retake CBC and draw T&S
per PA, no new orders at this time
no new orders at this time, continue pt on vent orders, monitor pt for signs of  respiratory distress
Repeat CBC ordered

## 2024-11-23 NOTE — PROGRESS NOTE ADULT - PROBLEM SELECTOR PLAN 2
[] Sepsis  - Sputum cx 11/18: CRE PSA, Urine cx 11/18:E.facelis ( Likely colonized/ UA Unimpressive ), Bld cx 11/18: NGTD   - Hypotension resolved Midodrine dcd, Solu-cortef tapered to 25 mg IVP Q 8 HRS  - Continue Zerbaxa       [] PNA   - Sputum cx 10/8: PSA  - CXR 10/7: Opacification of the right middle lobe may represent pneumonia versus atelectasis  - CT A/P 10/18: ? Multifocal pneumonia  - Completed Initial course of cefepime on 10/12  - Sputum 11/4: PSA; Repeat CT C/A/P 11/6: Consolidative opacity LLL  - Cefepime restarted 11/5-->11/15; in setting of recurrent fevers  - CT CAP on 11/11 showed left greater than right basilar consolidation and patchy groundglass opacity in both lungs consistent with atelectasis and/or pneumonia.     [] C.Diff   - Stool C.diff: + 11/1   - S/p Flagyl 11/3-11/8  - Oral Vanco 11/1-11/21

## 2024-11-23 NOTE — PROVIDER CONTACT NOTE (CRITICAL VALUE NOTIFICATION) - SITUATION
Am hemoglobin 7.0
Critical Hgb 6.9
Critical value received
H&H 6.4/19
ABG result from 11/18/24 ttl CO2 10, Bicarb 9, Lactate 63
Pt Hgb was 6.6
serum potassium level 2.9
Sputum culture completed 11/10/24 positive for pseudomonas aeruginosa, carbapenem resistant. Commensal vinay consistent with body site.
pt Hgb 6.8
critical serum glucose 45
PH 7.20, bicarb9, C02 10, Lactate 8.1
Potassium 2.4
Blood Culture drawn 11/01/2024, prelim, growth in anaerobic bottle gram positive cocci in cluster

## 2024-11-24 NOTE — PROGRESS NOTE ADULT - PROBLEM SELECTOR PLAN 9
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  - CT CAP on 11/11 shows feeding tube coils in the stomach with tip within the first portion of the duodenum  - G- J Tube repositioned on 11/13 via upper endoscopy   - Both feeds and medications to be given through feeding/J-port  - G-port is placed to continuous bag drainage for decompression  - Continue Maalox TID for gaseous distention  - GI  is following - Patient has known hx of prior G-Tube stoma leak   - S/p EGD for closure of Gastric Fistula (8/12) w/ Total of 3 Mantis clips and two 22 mm Microtech clips   - Old stoma peg site with mild clear drainage s/p repair of fistula  - 10/14 G-J exchanged by GI and clips applied to fistula site  - 10/17 PEGJ clogged  - 10/21 PEGJ revision done, tolerating enteral feeds  - Tube study requested to evaluate for J-tube migration 11/9; showed tip in duodenum.  - CT CAP on 11/11 shows feeding tube coils in the stomach with tip within the first portion of the duodenum  - G- J Tube repositioned on 11/13 via upper endoscopy   - Both feeds and medications to be given through feeding/J-port  - G-port is placed to continuous bag drainage for decompression  - Continue Maalox TID for gaseous distention  - decreased FW flushes via GJ tube to 20 cc q 1 hrs  - GI  is following

## 2024-11-24 NOTE — PROGRESS NOTE ADULT - PROBLEM SELECTOR PLAN 12
- Patients Daughter updated at bedside by RCU Team today  - MOLST Updated 11/19: Remains DNR / + Intubate, No Pressors / No transfer to ICU Setting   - Ongoing GOC discussions; Daughter continues to discuss options with family; currently cont medical management  - Palliative care continues to follow - Patients Daughter updated at bedside by RCU Team today  - MOLST Updated 11/19: Remains DNR / DNI, No Pressors / No transfer to ICU Setting, no HD, no admission to hospital,    - Ongoing GOC discussions; Daughter continues to discuss options with family; currently cont medical management  - Palliative care continues to follow  11/24- Discussed with pt's spouse, Mr Chad Woods.   __ Mr Chad Woods stated that as per his discussion with children: daughter Marysol, another daughter and son Papito__ no more blood draws, no more fingersticks, finish current course of Abx and no additional Abx,   -- Family: Mr Chad Woods and children will tell us when they are ready for palliative extubation. All information re: compassionate extubation was  provided to the spouse.

## 2024-11-24 NOTE — PROGRESS NOTE ADULT - PROBLEM SELECTOR PLAN 2
[] Sepsis  - Sputum cx 11/18: CRE PSA, Urine cx 11/18:E.facelis ( Likely colonized/ UA Unimpressive ), Bld cx 11/18: NGTD   - Hypotension resolved Midodrine dcd, Solu-cortef tapered to 25 mg IVP Q 8 HRS  - Continue Zerbaxa       [] PNA   - Sputum cx 10/8: PSA  - CXR 10/7: Opacification of the right middle lobe may represent pneumonia versus atelectasis  - CT A/P 10/18: ? Multifocal pneumonia  - Completed Initial course of cefepime on 10/12  - Sputum 11/4: PSA; Repeat CT C/A/P 11/6: Consolidative opacity LLL  - Cefepime restarted 11/5-->11/15; in setting of recurrent fevers  - CT CAP on 11/11 showed left greater than right basilar consolidation and patchy groundglass opacity in both lungs consistent with atelectasis and/or pneumonia.     [] C.Diff   - Stool C.diff: + 11/1   - S/p Flagyl 11/3-11/8  - Oral Vanco 11/1-11/21 [] Sepsis  -- recurrent sepsis on 11/18 with AMS and lactic acidosis:  - Sputum cx 11/18: CRE PSA, Urine cx 11/18:E.facelis ( Likely colonized/ UA Unimpressive ), Bld cx 11/18: NGTD, s/p PEG revision on 11/13   - Hypotension resolved Midodrine dcd, Solu-cortef tapered to 25 mg IVP Q 8 HRS > 25 mg daily (11/23- )  - Continue Zerbaxa empirically (11/18 to 11/25)      [] PNA   - Sputum cx 10/8: PSA  - CXR 10/7: Opacification of the right middle lobe may represent pneumonia versus atelectasis  - CT A/P 10/18: ? Multifocal pneumonia  - Completed Initial course of cefepime on 10/12  - Sputum 11/4: PSA; Repeat CT C/A/P 11/6: Consolidative opacity LLL  - Cefepime restarted 11/5-->11/15; in setting of recurrent fevers  - CT CAP on 11/11 showed left greater than right basilar consolidation and patchy groundglass opacity in both lungs consistent with atelectasis and/or pneumonia.     [] C.Diff   - Stool C.diff: + 11/1   - S/p Flagyl 11/3-11/8  - Oral Vanco 11/1-11/21, restarted as per ID recs (11/24-11/25)

## 2024-11-24 NOTE — PROGRESS NOTE ADULT - PROBLEM SELECTOR PLAN 1
- Patient with Chronic Trach at baseline   - Patient presented to ED with Hypoxemia in setting of PNA   - ABG repeated today Acidosis has since resolved  - Will decrease RR to 20 and repeat AM VBG  - Mechanical vent: Volume ctrl  AC /CMV:  RR: 20 / PEEP: 5 fio2 40%  - Continue Duoneb and Chest PT  - Continue Chest PT / Suctioning PRN - Patient with Chronic Trach at baseline   - Patient presented to ED with Hypoxemia in setting of PNA   - ABG repeated today Acidosis has since resolved  - Will decrease RR to 20 and repeat AM VBG  - Mechanical vent: Volume ctrl  AC /CMV:  RR: 20 / PEEP: 5 fio2 40%  - Peak Pressures in 40's, no significant secretions   - Continue Duoneb q 6 hrs, - Continue Chest PT / Suctioning PRN

## 2024-11-24 NOTE — PROGRESS NOTE ADULT - SUBJECTIVE AND OBJECTIVE BOX
Significant recent/past 24 hr events:    Subjective:    Review of Systems         [ ] A ten-point review of systems was otherwise negative except as noted.  [ ] Due to altered mental status/intubation, subjective information were not able to be obtained from the patient. History was obtained, to the extent possible, from review of the chart and collateral sources of information.      Patient is a 74y old  Female who presents with a chief complaint of Patient admitted with Hypoxemia and episode of Bright red blood per rectum (2024 11:42)    HPI:  75 yo F PMH T2DM on insulin, HTN, HLD, hypothyroidism, RA on Prednisone, Fibromyalgia, cerebral aneurysm s/p repair, chronic hypercapnic respiratory failure s/p trach (vent dependent) and chronic PEG , recurrent C. diff infection (follows with Dr. Newman), persistent GJ tube leaks now s/p GC fistula repair  BIBEMS with daughter for respiratory distress, hypoxia to high 80s.  Pt also noted to have rectal bleeding this past week requiring transfusion 5 days ago in ED as per daughter.  In ED, pt afebrile, fiO2 96-98% on 40% on ventilator.  Chest Xray w/ opacification of right lung concerning for possible PNA.  Admitted to RCU for further management.      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      H/O tracheostomy      PEG (percutaneous endoscopic gastrostomy) status        FAMILY HISTORY:      Vitals   ICU Vital Signs Last 24 Hrs  T(C): 36.3 (2024 04:28), Max: 36.3 (2024 00:09)  T(F): 97.3 (2024 04:28), Max: 97.3 (2024 00:09)  HR: 86 (2024 06:02) (75 - 104)  BP: 157/66 (2024 04:28) (102/57 - 157/66)  BP(mean): --  ABP: --  ABP(mean): --  RR: 20 (2024 04:28) (18 - 20)  SpO2: 98% (2024 06:02) (96% - 100%)    O2 Parameters below as of 2024 06:02  Patient On (Oxygen Delivery Method): ventilator            Physical Exam:   Constitutional: NAD, well-groomed, well-developed  HEENT: PERRLA, EOMI, no drainage or redness  Neck: supple,  No JVD, Trachea midline  Back: Normal spine flexure, No CVA tenderness, No deformity or limitation of movement  Respiratory: Breath Sounds equal & clear bilaterally to auscultation, no accessory muscle use noted  Cardiovascular: Regular rate, regular rhythm, normal S1, S2; no murmurs or rub  Gastrointestinal: Soft, non-tender, non distended, no hepatosplenomegaly, normal bowel sounds  Extremities: GAMA x 4, no peripheral edema, no cyanosis, no clubbing   Vascular: Equal and normal pulses: 2+ peripheral pulses throughout  Neurological: A+O x 3; speech clear and intact; no sensory, motor  deficits, normal reflexes  Psychiatric: calm, normal mood, normal affect  Musculoskeletal: No joint swelling or deformity; no limitation of movement  Skin: warm, dry, well perfused, no rashes    VENT SETTINGS   Mode: AC/ CMV (Assist Control/ Continuous Mandatory Ventilation)  RR (machine): 20  TV (machine): 400  FiO2: 40  PEEP: 5  ITime: 1  MAP: 15  PIP: 43        I&O's Detail    2024 07:01  -  2024 07:00  --------------------------------------------------------  IN:    Enteral Tube Flush: 200 mL    Free Water: 600 mL    IV PiggyBack: 166 mL    Miscellaneous Tube Feedin mL  Total IN: 1446 mL    OUT:    Drain (mL): 50 mL  Total OUT: 50 mL    Total NET: 1396 mL          LABS                        7.1    12.32 )-----------( 68       ( 2024 06:18 )             24.2     11-24    140  |  104  |  60[H]  ----------------------------<  159[H]  4.9   |  17[L]  |  0.60    Ca    8.0[L]      2024 06:18  Phos  4.2       Mg     1.8         TPro  5.5[L]  /  Alb  2.4[L]  /  TBili  1.1  /  DBili  x   /  AST  31  /  ALT  20  /  AlkPhos  108      LIVER FUNCTIONS - ( 2024 06:18 )  Alb: 2.4 g/dL / Pro: 5.5 g/dL / ALK PHOS: 108 U/L / ALT: 20 U/L / AST: 31 U/L / GGT: x                   Urinalysis Basic - ( 2024 06:18 )    Color: x / Appearance: x / SG: x / pH: x  Gluc: 159 mg/dL / Ketone: x  / Bili: x / Urobili: x   Blood: x / Protein: x / Nitrite: x   Leuk Esterase: x / RBC: x / WBC x   Sq Epi: x / Non Sq Epi: x / Bacteria: x      POCT Blood Glucose.: 166 mg/dL *H* (24 @ 05:51)  POCT Blood Glucose.: 204 mg/dL *H* (24 @ 23:26)  POCT Blood Glucose.: 178 mg/dL *H* (24 @ 18:34)  POCT Blood Glucose.: 154 mg/dL *H* (24 @ 17:27)  POCT Blood Glucose.: 128 mg/dL *H* (24 @ 11:46)        MEDICATIONS  (STANDING):  albuterol/ipratropium for Nebulization 3 milliLiter(s) Nebulizer every 6 hours  artificial  tears Solution 1 Drop(s) Both EYES every 6 hours  ascorbic acid 500 milliGRAM(s) Oral daily  Biotene Dry Mouth Oral Rinse 5 milliLiter(s) Swish and Spit every 6 hours  calcium carbonate 1250 mG  + Vitamin D (OsCal 500 + D) 1 Tablet(s) Oral <User Schedule>  ceftolozane/tazobactam IVPB 3000 milliGRAM(s) IV Intermittent every 8 hours  chlorhexidine 0.12% Liquid 15 milliLiter(s) Oral Mucosa every 12 hours  chlorhexidine 4% Liquid 1 Application(s) Topical daily  dextrose 5%. 1000 milliLiter(s) (100 mL/Hr) IV Continuous <Continuous>  dextrose 5%. 1000 milliLiter(s) (50 mL/Hr) IV Continuous <Continuous>  dextrose 50% Injectable 25 Gram(s) IV Push once  dextrose 50% Injectable 12.5 Gram(s) IV Push once  diclofenac sodium 1% Gel 2 Gram(s) Topical every 6 hours  escitalopram 10 milliGRAM(s) Oral <User Schedule>  fentaNYL   Patch  12 MICROgram(s)/Hr 1 Patch Transdermal every 72 hours  fentaNYL   Patch  25 MICROgram(s)/Hr 1 Patch Transdermal every 72 hours  FIRST- Mouthwash  BLM 5 milliLiter(s) Swish and Spit every 8 hours  gabapentin Solution 250 milliGRAM(s) Oral <User Schedule>  glucagon  Injectable 1 milliGRAM(s) IntraMuscular once  hemorrhoidal Ointment 1 Application(s) Rectal at bedtime  hydrALAZINE Injectable 10 milliGRAM(s) IV Push every 8 hours  hydrocortisone sodium succinate Injectable 25 milliGRAM(s) IV Push daily  influenza  Vaccine (HIGH DOSE) 0.5 milliLiter(s) IntraMuscular once  insulin glargine Injectable (LANTUS) 10 Unit(s) SubCutaneous <User Schedule>  insulin lispro (ADMELOG) corrective regimen sliding scale   SubCutaneous every 6 hours  insulin lispro Injectable (ADMELOG) 7 Unit(s) SubCutaneous every 6 hours  lactobacillus acidophilus 1 Tablet(s) Oral <User Schedule>  levothyroxine 125 MICROGram(s) Oral <User Schedule>  lidocaine   4% Patch 4 Patch Transdermal every 24 hours  nystatin Powder 1 Application(s) Topical every 8 hours  pantoprazole  Injectable 40 milliGRAM(s) IV Push every 12 hours  sodium chloride 1 Gram(s) Oral every 8 hours  sodium chloride 0.65% Nasal 1 Spray(s) Both Nostrils every 6 hours  triamcinolone 0.1% Oral Paste 1 Application(s) Topical every 8 hours  witch hazel Pads 1 Application(s) Topical every 12 hours    MEDICATIONS  (PRN):  acetaminophen   Oral Liquid .. 650 milliGRAM(s) Oral every 6 hours PRN Temp greater or equal to 38C (100.4F), Mild Pain (1 - 3)  aluminum hydroxide/magnesium hydroxide/simethicone Suspension 30 milliLiter(s) Enteral Tube every 4 hours PRN Dyspepsia  oxyCODONE    Solution 5 milliGRAM(s) Oral every 6 hours PRN Moderate Pain (4 - 6)      Allergies:  walnut (Unknown)  metronidazole (Rash)  Lyrica (Unknown)  penicillin (Unknown)  Pineapple (Unknown)  Tagamet (Unknown)  heparin (Unknown)  Pecans (Unknown)  Hazelnut (Unknown)  meropenem (Rash)        CRITICAL CARE TIME SPENT:  minutes of critical care time spent providing medical care for patient's acute illness/conditions that impairs at least one vital organ system and/or poses a high risk of imminent or life threatening deterioration in the patient's condition. It includes time spent evaluating and treating the patient's acute illness as well as time spent reviewing labs, radiology, discussing goals of care with patient and/or patient's family, and discussing the case with a multidisciplinary team, in an effort to prevent further life threatening deterioration or end organ damage. This time is independent of any procedures performed.         Significant recent/past 24 hr events: no significant events     Subjective: pt unable to provide any history due to cognitive impairment     Review of Systems         [ ] A ten-point review of systems was otherwise negative except as noted.  [ ] Due to altered mental status/intubation, subjective information were not able to be obtained from the patient. History was obtained, to the extent possible, from review of the chart and collateral sources of information.      Patient is a 74y old  Female who presents with a chief complaint of Patient admitted with Hypoxemia and episode of Bright red blood per rectum (2024 11:42)    HPI:  73 yo F PMH T2DM on insulin, HTN, HLD, hypothyroidism, RA on Prednisone, Fibromyalgia, cerebral aneurysm s/p repair, chronic hypercapnic respiratory failure s/p trach (vent dependent) and chronic PEG , recurrent C. diff infection (follows with Dr. Newman), persistent GJ tube leaks now s/p GC fistula repair  BIBEMS with daughter for respiratory distress, hypoxia to high 80s.  Pt also noted to have rectal bleeding this past week requiring transfusion 5 days ago in ED as per daughter.  In ED, pt afebrile, fiO2 96-98% on 40% on ventilator.  Chest Xray w/ opacification of right lung concerning for possible PNA.  Admitted to RCU for further management.      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      H/O tracheostomy      PEG (percutaneous endoscopic gastrostomy) status        FAMILY HISTORY:      Vitals   ICU Vital Signs Last 24 Hrs  T(C): 36.3 (2024 04:28), Max: 36.3 (2024 00:09)  T(F): 97.3 (2024 04:28), Max: 97.3 (2024 00:09)  HR: 86 (2024 06:02) (75 - 104)  BP: 157/66 (2024 04:28) (102/57 - 157/66)  BP(mean): --  ABP: --  ABP(mean): --  RR: 20 (2024 04:28) (18 - 20)  SpO2: 98% (2024 06:02) (96% - 100%)    O2 Parameters below as of 2024 06:02  Patient On (Oxygen Delivery Method): ventilator            Physical Exam:   Constitutional: NAD, well-groomed, well-developed  HEENT: PERRLA, EOMI, no drainage or redness  Neck: supple,  No JVD, Trachea midline  Back: Normal spine flexure, No CVA tenderness, No deformity or limitation of movement  Respiratory: Breath Sounds equal & clear bilaterally to auscultation, no accessory muscle use noted  Cardiovascular: Regular rate, regular rhythm, normal S1, S2; no murmurs or rub  Gastrointestinal: Soft, non-tender, non distended, no hepatosplenomegaly, normal bowel sounds  Extremities: GAMA x 4, no peripheral edema, no cyanosis, no clubbing   Vascular: Equal and normal pulses: 2+ peripheral pulses throughout  Neurological: lethargic, minimal response to painful stimuli; no sensory, motor  deficits, normal reflexes  Psychiatric: lethargic  Musculoskeletal: No joint swelling or deformity; no limitation of movement  Skin: warm, dry, well perfused, no rashes    VENT SETTINGS   Mode: AC/ CMV (Assist Control/ Continuous Mandatory Ventilation)  RR (machine): 20  TV (machine): 400  FiO2: 40  PEEP: 5  ITime: 1  MAP: 15  PIP: 43        I&O's Detail    2024 07:01  -  2024 07:00  --------------------------------------------------------  IN:    Enteral Tube Flush: 200 mL    Free Water: 600 mL    IV PiggyBack: 166 mL    Miscellaneous Tube Feedin mL  Total IN: 1446 mL    OUT:    Drain (mL): 50 mL  Total OUT: 50 mL    Total NET: 1396 mL          LABS                        7.1    12.32 )-----------( 68       ( 2024 06:18 )             24.2     11-24    140  |  104  |  60[H]  ----------------------------<  159[H]  4.9   |  17[L]  |  0.60    Ca    8.0[L]      2024 06:18  Phos  4.2     11-24  Mg     1.8         TPro  5.5[L]  /  Alb  2.4[L]  /  TBili  1.1  /  DBili  x   /  AST  31  /  ALT  20  /  AlkPhos  108      LIVER FUNCTIONS - ( 2024 06:18 )  Alb: 2.4 g/dL / Pro: 5.5 g/dL / ALK PHOS: 108 U/L / ALT: 20 U/L / AST: 31 U/L / GGT: x                   Urinalysis Basic - ( 2024 06:18 )    Color: x / Appearance: x / SG: x / pH: x  Gluc: 159 mg/dL / Ketone: x  / Bili: x / Urobili: x   Blood: x / Protein: x / Nitrite: x   Leuk Esterase: x / RBC: x / WBC x   Sq Epi: x / Non Sq Epi: x / Bacteria: x      POCT Blood Glucose.: 166 mg/dL *H* (24 @ 05:51)  POCT Blood Glucose.: 204 mg/dL *H* (24 @ 23:26)  POCT Blood Glucose.: 178 mg/dL *H* (24 @ 18:34)  POCT Blood Glucose.: 154 mg/dL *H* (24 @ 17:27)  POCT Blood Glucose.: 128 mg/dL *H* (24 @ 11:46)        MEDICATIONS  (STANDING):  albuterol/ipratropium for Nebulization 3 milliLiter(s) Nebulizer every 6 hours  artificial  tears Solution 1 Drop(s) Both EYES every 6 hours  ascorbic acid 500 milliGRAM(s) Oral daily  Biotene Dry Mouth Oral Rinse 5 milliLiter(s) Swish and Spit every 6 hours  calcium carbonate 1250 mG  + Vitamin D (OsCal 500 + D) 1 Tablet(s) Oral <User Schedule>  ceftolozane/tazobactam IVPB 3000 milliGRAM(s) IV Intermittent every 8 hours  chlorhexidine 0.12% Liquid 15 milliLiter(s) Oral Mucosa every 12 hours  chlorhexidine 4% Liquid 1 Application(s) Topical daily  dextrose 5%. 1000 milliLiter(s) (100 mL/Hr) IV Continuous <Continuous>  dextrose 5%. 1000 milliLiter(s) (50 mL/Hr) IV Continuous <Continuous>  dextrose 50% Injectable 25 Gram(s) IV Push once  dextrose 50% Injectable 12.5 Gram(s) IV Push once  diclofenac sodium 1% Gel 2 Gram(s) Topical every 6 hours  escitalopram 10 milliGRAM(s) Oral <User Schedule>  fentaNYL   Patch  12 MICROgram(s)/Hr 1 Patch Transdermal every 72 hours  fentaNYL   Patch  25 MICROgram(s)/Hr 1 Patch Transdermal every 72 hours  FIRST- Mouthwash  BLM 5 milliLiter(s) Swish and Spit every 8 hours  gabapentin Solution 250 milliGRAM(s) Oral <User Schedule>  glucagon  Injectable 1 milliGRAM(s) IntraMuscular once  hemorrhoidal Ointment 1 Application(s) Rectal at bedtime  hydrALAZINE Injectable 10 milliGRAM(s) IV Push every 8 hours  hydrocortisone sodium succinate Injectable 25 milliGRAM(s) IV Push daily  influenza  Vaccine (HIGH DOSE) 0.5 milliLiter(s) IntraMuscular once  insulin glargine Injectable (LANTUS) 10 Unit(s) SubCutaneous <User Schedule>  insulin lispro (ADMELOG) corrective regimen sliding scale   SubCutaneous every 6 hours  insulin lispro Injectable (ADMELOG) 7 Unit(s) SubCutaneous every 6 hours  lactobacillus acidophilus 1 Tablet(s) Oral <User Schedule>  levothyroxine 125 MICROGram(s) Oral <User Schedule>  lidocaine   4% Patch 4 Patch Transdermal every 24 hours  nystatin Powder 1 Application(s) Topical every 8 hours  pantoprazole  Injectable 40 milliGRAM(s) IV Push every 12 hours  sodium chloride 1 Gram(s) Oral every 8 hours  sodium chloride 0.65% Nasal 1 Spray(s) Both Nostrils every 6 hours  triamcinolone 0.1% Oral Paste 1 Application(s) Topical every 8 hours  witch hazel Pads 1 Application(s) Topical every 12 hours    MEDICATIONS  (PRN):  acetaminophen   Oral Liquid .. 650 milliGRAM(s) Oral every 6 hours PRN Temp greater or equal to 38C (100.4F), Mild Pain (1 - 3)  aluminum hydroxide/magnesium hydroxide/simethicone Suspension 30 milliLiter(s) Enteral Tube every 4 hours PRN Dyspepsia  oxyCODONE    Solution 5 milliGRAM(s) Oral every 6 hours PRN Moderate Pain (4 - 6)      Allergies:  walnut (Unknown)  metronidazole (Rash)  Lyrica (Unknown)  penicillin (Unknown)  Pineapple (Unknown)  Tagamet (Unknown)  heparin (Unknown)  Pecans (Unknown)  Hazelnut (Unknown)  meropenem (Rash)

## 2024-11-24 NOTE — PROGRESS NOTE ADULT - PROBLEM SELECTOR PLAN 6
- Pt receives Enbrel injections q Thursday (will hold in setting of recent infection)  - Home prednisone dcd and changed to Solu-cortef in setting of sepsis - Pt receives Enbrel injections q Thursday (will hold in setting of recent infection)  - Home prednisone dcd and changed to Solu-cortef in setting of sepsis  -- steroid taper and place back on Prednisone 5 mg daily after taper

## 2024-11-24 NOTE — PROGRESS NOTE ADULT - PROBLEM SELECTOR PLAN 5
- Patient on Lantus and Humalog at home    - Patient with Hyperglycemia yesterday   - D5W HC03 Drip dcd, Steroids tapered   - D/w ID Pharmacy today if abx can be changed to NS if feasible will change order  - Cont Lispro 5 units q 6 hrs / Lantus 10 units ordered to begin 11/21   - Cont  ISS  - Endocrine following - Patient on Lantus and Humalog at home    - Patient with Hyperglycemia   - D5W HC03 Drip dcd, Steroids tapered   - Meds adjusted to avoid D 5 formulary  - As per endo recs, Continue Lantus to 10u QAM and Admelog 7 u q 6 hours while on 24 continuous TFs   - Cont  ISS, >166, cont monitoring

## 2024-11-24 NOTE — PROGRESS NOTE ADULT - PROBLEM SELECTOR PLAN 3
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  No focal intra-abdominal/pelvic or subcutaneous collections.  - Occult 10/7: Positive  - Transfused on 10/10 and 10/30 and 11/12with appropriate response in H+H  - Heme consult appreciated 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  No focal intra-abdominal/pelvic or subcutaneous collections.  - Occult 10/7: Positive  - Transfused on 10/10 and 10/30 and 11/12 with appropriate response in H+H  - Heme consult appreciated  -- Hgb is 7.1 today from 8 yesterday, mostly has been in 7's, no obvious bleeding, __ repeat H/H at 14:00, Type and screen is active, cont monitoring

## 2024-11-24 NOTE — PROGRESS NOTE ADULT - NS ATTEND AMEND GEN_ALL_CORE FT
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 Found to have Pseudomonas aeruginosa in sputum culture and urine culture with ESBL E. coli s/p course of meropenem. Course complicated by antibiotic-associated diarrhea.     - obtunded, intermittently opens eyes, overall picture c/w septic encephelopathy   - remains MV dependent  - cont Duonebs + 3% q6h for ACT  - improved sepsis w/ Zerbaxa as sputum cx now w/ more resistant PsA, f/u ID recs    - BP has been labile, off midodrine now, elevated BP this morning, c/w started Hydralazine  - c/w intermittent diuresis given significant edema  - monitor i/o and now off bicarb gtt given ph   - Anemia multifactorial, hemorrhoids, AOCD. s/p EGD without active bleeding, transfuse for Hb < 7  - cont to monitor FS, cont tapering stress dose steroids  (for RA on home prednisone 5 mg daily )  - cont wound care to sacrum for decub    - DVT ppx- only scd for now given significant anemia a few days ago  Prognosis guarded  Dispo - DNR

## 2024-11-24 NOTE — CHART NOTE - NSCHARTNOTEFT_GEN_A_CORE
Discussed with pt's spouse, Mr Chad Woods.   __ Mr Chad Woods stated that as per his discussion with children: daughter Marysol, another daughter and son Papito__ no more blood draws, no more fingersticks, finish current course of Abx and no additional Abx,   -- Family: Mr Chad Woods and children will tell us when they are ready for palliative extubation. All information re: compassionate extubation was  provided to the spouse

## 2024-11-24 NOTE — PROGRESS NOTE ADULT - PROBLEM SELECTOR PLAN 4
- Amolodipine and hydralazine dcd in setting of hypotension   - Continue to monitor BP - Amolodipine and hydralazine dcd in setting of hypotension   - Continue to monitor BP, SBP is labile-- 140's>100's> 157/66 >> cont monitoring

## 2024-11-24 NOTE — PROGRESS NOTE ADULT - ASSESSMENT
75 yo F PMH T2DM on insulin, HTN, HLD, hypothyroidism, RA on Prednisone, Fibromyalgia, cerebral aneurysm s/p repair, chronic hypercapnic respiratory failure s/p trach (vent dependent) and chronic PEG 2022, recurrent C. diff infection (follows with Dr. Newman), persistent GJ tube leaks now s/p GC fistula repair 8/12 BIBEMS on 10/7 with daughter for respiratory distress, hypoxia to 88%.  Pt also noted to have rectal bleeding week prior to presentation requiring transfusion 5 days ago in ED as per daughter. Daughter also reports brownish discharge from G-J tube. In ED, pt afebrile, fiO2 96-98% on 40% on ventilator.  Chest X-ray w/ opacification of right lung concerning for possible PNA.  Admitted to RCU for further management. Sputum cxs grew PSA; treated with course of Cefepime. Patient with decreased H+H received 1 unit of PRBCS on 10/10.  10/14 EGD w/ Dr. Rosales for PEGJ exchange and clippings placed for closure of fistula site.  Persistent fevers treated with meropenem and vanc (d/c'd 10/20).  CT A/P without infectious source.  Continued fevers and persistent leukocytosis 11/1 CDiff positive - po vanco started, IV flagyl added (11/3-11/8). Head CT performed on 11/6 due to concern of lethargy no acute intracranial findings were noted; likely related to metabolic encephalopathy. Patient continued to have persistent fevers and was empirically treated with Cefepime (11/5-11/9) for CR PSA in sputum cx. CT C/A/P performed 11/6 c/w consolidative opacity LLL. Evening of 11/10-11/11, WBC 15 --> 21, procal 1 --> 43, lactate 3.2 -- pt also febrile. Infectious w/u sent 11/10 -- BCx negative, trach aspirate cx pending, UA negative. CT CAP on 11/11 showed left greater than right basilar consolidation and patchy ground glass opacity in both lungs consistent with atelectasis and/or pneumonia; Feeding tube coils in the stomach with tip within the first portion of the duodenum, No bowel obstruction. Cefepime restarted 11/11-11/15 with improvement in leukocytosis. Patient underwent Endoscopic adjustment of peg 11/13 with Dr. Rosales. XR tube check performed. Patient became Septic again on 11/19 requiring Midodrine, Solucortef and HC03 drip; Patient was empirically placed on Zerbaxa.     11/23: No events reported overnight. Ongoing GOC Discussions; MOLST: DNR, No pressors and No transfer to ICU if indicated. Improved lactate, procalcitonin and awareness. Patients cxs from 11/18; Blood cx: NGTD, Sputum cx: CRE PSA( sensitive to Cef/sloan) and Urine cx growing E. faecalis.  As per ID will continue Cef/Sloan for short course. Steroids decreased to 1x/day. Titrate to off. 75 yo F PMH T2DM on insulin, HTN, HLD, hypothyroidism, RA on Prednisone, Fibromyalgia, cerebral aneurysm s/p repair, chronic hypercapnic respiratory failure s/p trach (vent dependent) and chronic PEG 2022, recurrent C. diff infection (follows with Dr. Newman), persistent GJ tube leaks now s/p GC fistula repair 8/12 BIBEMS on 10/7 with daughter for respiratory distress, hypoxia to 88%.  Pt also noted to have rectal bleeding week prior to presentation requiring transfusion 5 days ago in ED as per daughter. Daughter also reports brownish discharge from G-J tube. In ED, pt afebrile, fiO2 96-98% on 40% on ventilator.  Chest X-ray w/ opacification of right lung concerning for possible PNA.  Admitted to RCU for further management. Sputum cxs grew PSA; treated with course of Cefepime. Patient with decreased H+H received 1 unit of PRBCS on 10/10.  10/14 EGD w/ Dr. Rosales for PEGJ exchange and clippings placed for closure of fistula site.  Persistent fevers treated with meropenem and vanc (d/c'd 10/20).  CT A/P without infectious source.  Continued fevers and persistent leukocytosis 11/1 CDiff positive - po vanco started, IV flagyl added (11/3-11/8). Head CT performed on 11/6 due to concern of lethargy no acute intracranial findings were noted; likely related to metabolic encephalopathy. Patient continued to have persistent fevers and was empirically treated with Cefepime (11/5-11/9) for CR PSA in sputum cx. CT C/A/P performed 11/6 c/w consolidative opacity LLL. Evening of 11/10-11/11, WBC 15 --> 21, procal 1 --> 43, lactate 3.2 -- pt also febrile. Infectious w/u sent 11/10 -- BCx negative, trach aspirate cx pending, UA negative. CT CAP on 11/11 showed left greater than right basilar consolidation and patchy ground glass opacity in both lungs consistent with atelectasis and/or pneumonia; Feeding tube coils in the stomach with tip within the first portion of the duodenum, No bowel obstruction. Cefepime restarted 11/11-11/15 with improvement in leukocytosis. Patient underwent Endoscopic adjustment of peg 11/13 with Dr. Rosales. XR tube check performed. Patient became Septic again on 11/19 requiring Midodrine, Solucortef and HC03 drip; Patient was empirically placed on Zerbaxa.     11/23: No events reported overnight. Ongoing GOC Discussions; MOLST: DNR, No pressors and No transfer to ICU if indicated. Improved lactate, procalcitonin and awareness. Patients cxs from 11/18; Blood cx: NGTD, Sputum cx: CRE PSA( sensitive to Cef/sloan) and Urine cx growing E. faecalis.  As per ID will continue Cef/Sloan for short course. Steroids decreased to 1x/day. Titrate to off.  11/24- no acute events,

## 2024-11-25 NOTE — PROGRESS NOTE ADULT - ASSESSMENT
73 yo woman PMH T2DM on insulin, HTN, HLD, hypothyroidism, RA on Prednisone, Fibromyalgia, cerebral aneurysm s/p repair, chronic hypercapnic respiratory failure s/p trach (vent dependent) and chronic PEG 2022, recurrent C. diff infection , persistent GJ tube leaks now s/p GC fistula repair 8/12  admitted 10/7/24 with resp distress and found to have RML opacity     Antibiotics  Ceftriaxone 10/7  Azithro 10/7  Cefepime 10/7--> 10/12  meropenem 10/15 --> 10/23  IV Vanco 10/17 --> 10/21  Vanco via NGT 10/24; 11/1-->  Metronidazole 11/3 --> 11/7  Cefepime 11/5 --> 11/10; 11/11--> 11/15  Erythromycin 11/11-->  Ceftolozane, tazobactam 11/18 --> 11/25      10/10 melena, anemia, blood tx  10/14 EGD for GJ exchange and piror PEG site closure  One benign-appearing, intrinsic moderate stenosis was found in the upper third of the        esophagus. The stenosis was traversed.       The cardia and gastric fundus were normal.       A gastric tube with tip in the jejunum was found in the gastric body. The PEG required        removal because it was leaking. The J tube extension (12F) was removed first. An externally        removable 24 Fr EndoVive Safety gastrostomy tube was lubricated. The guide wire was passed        through the existing G-tube port and snared endoscopically. The endoscope and snare were then        removed, pulling the wire out through the mouth. The g-tube was tied to the guidewire, pulled        through the mouth into the stomach and then pulled out from the stomach through the skin. The        bumper was attached to the gastrostomy tube. The feeding tube was then cut to an appropriate        length. The final position of the gastrostomy tube was confirmed by relook endoscopy, and        skin marking noted to be 3 cm at the external bumper. The final tension and compression of        the abdominal wall by the PEG tube and external bumper were checked and revealed that the        bumper was moderately tight and mildly deforming the skin. The J tube extension (12 F        EndoVive Jejunal feeding tube) was advanced into the stomach. The tip of the J tube had a        loop thread that was captured with a Resolution clip. The thread and the J tube extension        were advanced to the proximal jejunum, and the endoclip along with the tip of the J tube was        attached to the wall securely. The J tube extension was capped, and the tube site was cleaned        and dressed.       A small fistula was found on the anterior wall of the gastric body related to prior G tube        site. Coagulation of tissue near the fistula using argon plasma at 1.2 liters/minute and 35        peraza was successful. To closure the gastrocutaneous fistula, four hemostatic clips were        successfully placed (MR conditional, two 16 mm DuraClip and 2 Mantis clips.       The examined duodenum was normal.    10/14, 10/15 Fever, transient hypoxia post procedure  10/15 ProCalcitonin = 8.48 suggestive of bacterial process; BCx x2 NGTD; Trach: Pseudomonas   - though benign mg stain  10;16 Leukocytosis WBC = 14.26  10/17 fever, hypotension, abd distension, no diarrhea noted this am WBC = 15.02; Rising Procalcitonin = 38.49; Obstructed J tube   10/18  lost IV access re-gained  - CT scan late this afternoon  WBC = 8.68; Rising Procalcitonin >100  10/18 imaging suggests multifocal pneumonia  10/21 improved chest exam, Procalcitonin level considerably decreased, decreased FiO2  - afebrile since 10/18  10/21 UGI endoscopy done  Findings:       The esophagus was normal. A fistula was found on the anterior wall of the gastric body.       There was evidence of a gastrostomy present in the gastric body. The patient was placed in        the supine position. The endoscope was advanced to the jejunum and the prior placed J tube        with loop attached to the wall and the loop thread was cut by endoclip. The J tube and the G        tube were removed. The gastrostomy fistula was utilized and an Avanos 22 F        Gastrostomy/Jejunal tube was advanced. The jejunal tip was advanced through the pylorus and        into the duodenum. The endoscope was then advanced to the duodenum and the loop thread at the        tip of the J tube was captured and advanced to the proximal jejunum. The loop thread was        clipped to wall by a [employer] Resolution clip. The J tube flushed easily and the G        tube decompress the abdomen easily. The internal balloon was insufflated with 10 cc of water        to hold the GJ tube in place. The outside marking was at 3.  10/23  no distress, liquid stools noted  - Meropenem discontinued  GI PCR NEG  10/24  persistent diarrhea  Cdiff NEG; enteral vanco stopped and Immodium provided  10/28 low grade temps, mild leukocytosis  10/30 blood transfusion  11/1  fever  +Cdiff  11/3 continued fever  - +BC Stph epi most likely procurement contaminant,  modest diarrhea reported  - daughter described increased diarrhea in evenings - Pseudomonas airway colonization is long term, no suggestion of pneumonia at present, sacral decub remains superifical will granulated without signs of infection  11/4  - fever, abnormal chest exam though FIO2 still 30%  rectal tube inserted for increased diarrhea  11/5 lethargy, fever, leukocytosis, increased lactate, hyponatremia  11/6 sputum gram stain suggests persistent Pseudomonas colonization  - continued fever, leukocytosis  11/7 unclear if LLL consolidation represents infection. Repeat Procalcitonin  11/7= 2.92 decreased  11/8 continued fevers  +diarrhea, lethargy continues  to complete Cefepime for LLL pneumonia v atelectasis tomorrow 11/9 11/11 increased fever with increased WBC and increased Procalcitonin off Cefepime noted, which was resumed  Concern if feed is refluxing back to the stomach versus J tube now in the stomach.     imaging show G tube in 1st part of duodenum. there are bibasilar L>R consolidations - likely continual reaspiration  11/12 persistent leukocytosis  11/13, decreased height of fever, decreased leukocytosis, Procalcitonin decreased to 52% of the 11/11 value suggesting good therapeutic response. s/p endoscopic replacement of feeding tube into jejunum  - Rectal tube came out  11/15 sleepy  - leukocytosis almost completely resolved      11/18 disturbing decompensation with clinical toxicity, unresponsive, increased leukocytosis, lactic acidosis   extensive antibiotic exposure with chronic Pseudomonas colonization of upper airway makes antibiotic resistance likely  Dental ASSESSMENT appreciated : Bedside Exam: No evidence of swelling, abscess or fistula. However, odontogenic infection cannot be fully ruled out without dental radiographs. No aspiration [of tooth] rk at this time.    11/19 increased leukocytosis, persistent lactic acidosis,   decreased ProCalcitonin  11/21  blood work improved  - I discussed with daughter who is considering comfort care at home after current antibiotic course is completed  11/22 reponsive now, clinically improved  11.25 improved, less toxic, with decreased leukocytosis and gratifying serial decline in ProCalcitonin value, though remain profoundly functionally impaired, generally somnolent and vulnerable       Issues  recurrent Cdiff  cerebral aneurysm s/p repair  chronic hypercapnic respiratory failure s/p trach (vent dependent) and chronic PEG 2022  anemia  - had iron deficiency  Pseudomonas aeruginosa upper airway colonization  (most recent isolate Resist to Cipro/Levo)  T2DM on insulin  10.14.24 -A1C: 5.8%  HTN  HLD  hypothyroidism  RA on Prednisone  Fibromyalgia  debility  sacral ulcer largely healed  malnourished/chronic catabolic state  aspiration      Suggest  Complete Ceftolozane, tazobactam 11/18 through 11/25  STOP  po Vanco through 11/2g    discussed with RCU team

## 2024-11-25 NOTE — PROGRESS NOTE ADULT - PROBLEM SELECTOR PLAN 2
[] Sepsis  - Sputum cx 11/18: CRE PSA, Urine cx 11/18:E.facelis ( Likely colonized/ UA Unimpressive ), Bld cx 11/18: NGTD   - Hypotension resolved Midodrine dcd, Solu-cortef Dcd  - Zerbaxa and enteral Vanco to complete today       [] PNA   - Sputum cx 10/8: PSA  - CXR 10/7: Opacification of the right middle lobe may represent pneumonia versus atelectasis  - CT A/P 10/18: ? Multifocal pneumonia  - Completed Initial course of cefepime on 10/12  - Sputum 11/4: PSA; Repeat CT C/A/P 11/6: Consolidative opacity LLL  - Cefepime restarted 11/5-->11/15; in setting of recurrent fevers  - CT CAP on 11/11 showed left greater than right basilar consolidation and patchy groundglass opacity in both lungs consistent with atelectasis and/or pneumonia.     [] C.Diff   - Stool C.diff: + 11/1   - S/p Flagyl 11/3-11/8  - Oral Vanco 11/1-11/25

## 2024-11-25 NOTE — PROGRESS NOTE ADULT - SUBJECTIVE AND OBJECTIVE BOX
Chief Complaint: Diabetes Mellitus follow up    INTERVAL HX:  Patient seen at bedside with family, aide and RN at bedside. Patient not participating in exam. spoke with  and family to clarify plans for feeding, BG monitoring and insulin administration. Receiving palliative care.    BG over the last 24 hrs have been within goal range 100-200mg/dl. No hypoglycemia.     Review of Systems:  unable    Allergies    walnut (Unknown)  metronidazole (Rash)  Lyrica (Unknown)  penicillin (Unknown)  Pineapple (Unknown)  Tagamet (Unknown)  heparin (Unknown)  Pecans (Unknown)  Hazelnut (Unknown)  meropenem (Rash)    Intolerances      MEDICATIONS  (STANDING):  artificial  tears Solution 1 Drop(s) Both EYES every 6 hours  Biotene Dry Mouth Oral Rinse 5 milliLiter(s) Swish and Spit every 6 hours  chlorhexidine 0.12% Liquid 15 milliLiter(s) Oral Mucosa every 12 hours  chlorhexidine 4% Liquid 1 Application(s) Topical daily  diclofenac sodium 1% Gel 2 Gram(s) Topical every 6 hours  escitalopram 10 milliGRAM(s) Oral <User Schedule>  fentaNYL   Patch  12 MICROgram(s)/Hr 1 Patch Transdermal every 72 hours  fentaNYL   Patch  25 MICROgram(s)/Hr 1 Patch Transdermal every 72 hours  FIRST- Mouthwash  BLM 5 milliLiter(s) Swish and Spit every 8 hours  gabapentin Solution 250 milliGRAM(s) Oral <User Schedule>  hemorrhoidal Ointment 1 Application(s) Rectal at bedtime  hydrALAZINE Injectable 10 milliGRAM(s) IV Push every 8 hours  influenza  Vaccine (HIGH DOSE) 0.5 milliLiter(s) IntraMuscular once  insulin glargine Injectable (LANTUS) 10 Unit(s) SubCutaneous <User Schedule>  insulin lispro (ADMELOG) corrective regimen sliding scale   SubCutaneous every 6 hours  lactobacillus acidophilus 1 Tablet(s) Oral <User Schedule>  levothyroxine 125 MICROGram(s) Oral <User Schedule>  lidocaine   4% Patch 4 Patch Transdermal every 24 hours  nystatin Powder 1 Application(s) Topical every 8 hours  sodium chloride 1 Gram(s) Oral every 8 hours  triamcinolone 0.1% Oral Paste 1 Application(s) Topical every 8 hours  vancomycin    Solution 125 milliGRAM(s) Oral every 6 hours  witch hazel Pads 1 Application(s) Topical every 12 hours        hydrocortisone sodium succinate Injectable   25 milliGRAM(s) IV Push (11-25-24 @ 06:16)    insulin glargine Injectable (LANTUS)   10 Unit(s) SubCutaneous (11-25-24 @ 06:18)    insulin lispro (ADMELOG) corrective regimen sliding scale   1 Unit(s) SubCutaneous (11-25-24 @ 00:16)   2 Unit(s) SubCutaneous (11-24-24 @ 17:44)    insulin lispro Injectable (ADMELOG)   7 Unit(s) SubCutaneous (11-25-24 @ 12:05)   7 Unit(s) SubCutaneous (11-25-24 @ 00:17)    levothyroxine   125 MICROGram(s) Oral (11-25-24 @ 05:15)        PHYSICAL EXAM:  VITALS: T(C): 36.3 (11-25-24 @ 12:05)  T(F): 97.3 (11-25-24 @ 12:05), Max: 97.3 (11-25-24 @ 12:05)  HR: 73 (11-25-24 @ 15:39) (64 - 84)  BP: 180/69 (11-25-24 @ 12:05) (143/61 - 180/69)  RR:  (18 - 20)  SpO2:  (98% - 100%)  Wt(kg): --  GENERAL: NAD  Respiratory: Respirations unlabored-trached and vented   Extremities: Warm, no edema  NEURO: awake but not participating in exam.     LABS:  POCT Blood Glucose.: 119 mg/dL (11-25-24 @ 11:51)  POCT Blood Glucose.: 92 mg/dL (11-25-24 @ 05:52)  POCT Blood Glucose.: 191 mg/dL (11-24-24 @ 23:34)  POCT Blood Glucose.: 205 mg/dL (11-24-24 @ 17:18)  POCT Blood Glucose.: 142 mg/dL (11-24-24 @ 11:41)  POCT Blood Glucose.: 166 mg/dL (11-24-24 @ 05:51)  POCT Blood Glucose.: 204 mg/dL (11-23-24 @ 23:26)  POCT Blood Glucose.: 178 mg/dL (11-23-24 @ 18:34)  POCT Blood Glucose.: 154 mg/dL (11-23-24 @ 17:27)  POCT Blood Glucose.: 128 mg/dL (11-23-24 @ 11:46)  POCT Blood Glucose.: 108 mg/dL (11-23-24 @ 05:11)  POCT Blood Glucose.: 159 mg/dL (11-22-24 @ 23:42)  POCT Blood Glucose.: 193 mg/dL (11-22-24 @ 18:47)  POCT Blood Glucose.: 185 mg/dL (11-22-24 @ 17:26)                          7.1    12.32 )-----------( 68       ( 24 Nov 2024 06:18 )             24.2     11-24    140  |  104  |  60[H]  ----------------------------<  159[H]  4.9   |  17[L]  |  0.60    Ca    8.0[L]      24 Nov 2024 06:18  Phos  4.2     11-24  Mg     1.8     11-24    TPro  5.5[L]  /  Alb  2.4[L]  /  TBili  1.1  /  DBili  x   /  AST  31  /  ALT  20  /  AlkPhos  108  11-24        Thyroid Function Tests:      A1C with Estimated Average Glucose Result: 5.8 % (10-14-24 @ 05:08)    Estimated Average Glucose: 120 mg/dL (10-14-24 @ 05:08)        Diet, NPO with Tube Feed:   Tube Feeding Modality: Jejunostomy  Enersave Peptide 1.5 ([id number])  Total Volume for 24 Hours (mL): 960  Continuous  Until Goal Tube Feed Rate (mL per Hour): 40  Tube Feed Duration (in Hours): 24  Tube Feed Start Time: 06:00  Free Water Flush  Pump   Rate (mL per Hour): 20   Frequency: Every Hour  Free Water Flush Instructions:  20 ml free water flushes Q1hr  Alfred(7 Gm Arginine/7 Gm Glut/1.2 Gm HMB     Qty per Day:  2  No Carb Prosource (1pkg = 15gms Protein)     Qty per Day:  1  Banatrol TF     Qty per Day:  1/2 packet per daily (11-24-24 @ 12:59) [Active]

## 2024-11-25 NOTE — PROGRESS NOTE ADULT - SUBJECTIVE AND OBJECTIVE BOX
SUBJECTIVE AND OBJECTIVE: pt seen and examined at bedside. pt lethargic on exam.   Indication for Geriatrics and Palliative Care Services/INTERVAL HPI: GOC     OVERNIGHT EVENTS: no acute events o/n     DNR on chart:Intubate  Intubate      Allergies    walnut (Unknown)  metronidazole (Rash)  Lyrica (Unknown)  penicillin (Unknown)  Pineapple (Unknown)  Tagamet (Unknown)  heparin (Unknown)  Pecans (Unknown)  Hazelnut (Unknown)  meropenem (Rash)    Intolerances    MEDICATIONS  (STANDING):  artificial  tears Solution 1 Drop(s) Both EYES every 6 hours  Biotene Dry Mouth Oral Rinse 5 milliLiter(s) Swish and Spit every 6 hours  chlorhexidine 0.12% Liquid 15 milliLiter(s) Oral Mucosa every 12 hours  chlorhexidine 4% Liquid 1 Application(s) Topical daily  diclofenac sodium 1% Gel 2 Gram(s) Topical every 6 hours  escitalopram 10 milliGRAM(s) Oral <User Schedule>  fentaNYL   Patch  12 MICROgram(s)/Hr 1 Patch Transdermal every 72 hours  fentaNYL   Patch  25 MICROgram(s)/Hr 1 Patch Transdermal every 72 hours  FIRST- Mouthwash  BLM 5 milliLiter(s) Swish and Spit every 8 hours  gabapentin Solution 250 milliGRAM(s) Oral <User Schedule>  hemorrhoidal Ointment 1 Application(s) Rectal at bedtime  hydrALAZINE Injectable 10 milliGRAM(s) IV Push every 8 hours  influenza  Vaccine (HIGH DOSE) 0.5 milliLiter(s) IntraMuscular once  insulin glargine Injectable (LANTUS) 10 Unit(s) SubCutaneous <User Schedule>  insulin lispro (ADMELOG) corrective regimen sliding scale   SubCutaneous every 6 hours  insulin lispro Injectable (ADMELOG) 7 Unit(s) SubCutaneous every 6 hours  lactobacillus acidophilus 1 Tablet(s) Oral <User Schedule>  levothyroxine 125 MICROGram(s) Oral <User Schedule>  lidocaine   4% Patch 4 Patch Transdermal every 24 hours  nystatin Powder 1 Application(s) Topical every 8 hours  sodium chloride 1 Gram(s) Oral every 8 hours  triamcinolone 0.1% Oral Paste 1 Application(s) Topical every 8 hours  vancomycin    Solution 125 milliGRAM(s) Oral every 6 hours  witch hazel Pads 1 Application(s) Topical every 12 hours    MEDICATIONS  (PRN):  acetaminophen   Oral Liquid .. 650 milliGRAM(s) Oral every 6 hours PRN Temp greater or equal to 38C (100.4F), Mild Pain (1 - 3)  aluminum hydroxide/magnesium hydroxide/simethicone Suspension 30 milliLiter(s) Enteral Tube every 4 hours PRN Dyspepsia  oxyCODONE    Solution 5 milliGRAM(s) Oral every 6 hours PRN Moderate Pain (4 - 6)      ITEMS UNCHECKED ARE NOT PRESENT    PRESENT SYMPTOMS: [ x]Unable to self-report - see [ ] CPOT [ x] PAINADS [x ] RDOS  Source if other than patient:  [ ]Family   [ x]Team     Pain:  [ ]yes [ ]no  QOL impact -   Location -                    Aggravating factors -  Quality -  Radiation -  Timing-  Severity (0-10 scale):  Minimal acceptable level (0-10 scale):     CPOT:    https://www.[facility].org/getattachment/yds19s70-6d5s-2r8l-[email or web address]/Critical-Care-Pain-Observation-Tool-(CPOT)    PAINAD Score: See PAINAD tool and score below       Dyspnea:                           [ ]Mild [ ]Moderate [ ]Severe    RDOS: See RDOS tool and score below   0 to 2  minimal or no respiratory distress   3  mild distress  4 to 6 moderate distress  >7 severe distress      Anxiety:                             [ ]Mild [ ]Moderate [ ]Severe  Fatigue:                             [ ]Mild [ ]Moderate [ ]Severe  Nausea:                             [ ]Mild [ ]Moderate [ ]Severe  Loss of appetite:              [ ]Mild [ ]Moderate [ ]Severe  Constipation:                    [ ]Mild [ ]Moderate [ ]Severe    PCSSQ[Palliative Care Spiritual Screening Question]   Severity (0-10):  Score of 4 or > indicate consideration of Chaplaincy referral.  Chaplaincy Referral: [ ] yes [ ] refused [ ] following [ ] Deferred     Caregiver Soldier? : [ ] yes [ ] no [ ] Deferred [ ] Declined             Social work referral [ ] Patient & Family Centered Care Referral [ ]     Anticipatory Grief present?:  [ ] yes [ ] no  [ ] Deferred                  Social work referral [ ] Chaplaincy Referral [ ]    		  Other Symptoms:  [ x]All other review of systems negative     Palliative Performance Status Version 2:   See PPSv2 tool and score below         PHYSICAL EXAM:  Vital Signs Last 24 Hrs  T(C): 36.3 (25 Nov 2024 12:05), Max: 36.3 (25 Nov 2024 12:05)  T(F): 97.3 (25 Nov 2024 12:05), Max: 97.3 (25 Nov 2024 12:05)  HR: 64 (25 Nov 2024 12:05) (64 - 85)  BP: 180/69 (25 Nov 2024 12:05) (143/61 - 180/69)  BP(mean): --  RR: 20 (25 Nov 2024 12:05) (18 - 20)  SpO2: 100% (25 Nov 2024 12:05) (97% - 100%)    Parameters below as of 25 Nov 2024 12:05  Patient On (Oxygen Delivery Method): ventilator     I&O's Summary    24 Nov 2024 07:01  -  25 Nov 2024 07:00  --------------------------------------------------------  IN: 1330 mL / OUT: 850 mL / NET: 480 mL       GENERAL: [ ]Cachexia    [ ]Alert  [ ]Oriented x   [x ]Lethargic  [ ]Unarousable  [ ]Verbal  [ ]Non-Verbal  Behavioral:   [ ]Anxiety  [ ]Delirium [ ]Agitation [ ]Other  HEENT:  [ ]Normal   [ ]Dry mouth   [x ]ET Tube/Trach  [ ]Oral lesions  PULMONARY:   [ ]Clear [ ]Tachypnea  [ ]Audible excessive secretions   [ ]Rhonchi        [ ]Right [ ]Left [ ]Bilateral  [ ]Crackles        [ ]Right [ ]Left [ ]Bilateral  [ ]Wheezing     [ ]Right [ ]Left [ ]Bilateral  [x ]Diminished BS [ ] Right [ ]Left [ x]Bilateral  CARDIOVASCULAR:    [x ]Regular [ ]Irregular [ ]Tachy  [ ]Red [ ]Murmur [ ]Other  GASTROINTESTINAL:  [x ]Soft  [ ]Distended   [x ]+BS  [ x]Non tender [ ]Tender  [ ]Other [x ]PEG [ ]OGT/ NGT   Last BM:   GENITOURINARY:  [ ]Normal [ x]Incontinent   [ ]Oliguria/Anuria   [ ]Mcbride  MUSCULOSKELETAL:   [ ]Normal   [x ]Weakness  [x ]Bed/Wheelchair bound [ ]Edema  NEUROLOGIC:   [ ]No focal deficits  [ x] Cognitive impairment  [ ] Dysphagia [ ]Dysarthria [ ] Paresis [ ]Other   SKIN:   [ ]Normal  [ ]Rash  [ ]Other  [ x]Pressure ulcer(s) [x]y [ ]n present on admission    CRITICAL CARE:  [ ]Shock Present  [ ]Septic [ ]Cardiogenic [ ]Neurologic [ ]Hypovolemic  [ ]Vasopressors [ ]Inotropes  [ ]Respiratory failure present [ ]Mechanical Ventilation [ ]Non-invasive ventilatory support [ ]High-Flow Mode: AC/ CMV (Assist Control/ Continuous Mandatory Ventilation), RR (machine): 20, TV (machine): 400, FiO2: 40, PEEP: 5, ITime: 1, MAP: 16, PIP: 48  [ ]Acute  [ ]Chronic [ ]Hypoxic  [ ]Hypercarbic [ ]Other  [ ]Other organ failure     LABS:                        7.1    12.32 )-----------( 68       ( 24 Nov 2024 06:18 )             24.2   11-24    140  |  104  |  60[H]  ----------------------------<  159[H]  4.9   |  17[L]  |  0.60    Ca    8.0[L]      24 Nov 2024 06:18  Phos  4.2     11-24  Mg     1.8     11-24    TPro  5.5[L]  /  Alb  2.4[L]  /  TBili  1.1  /  DBili  x   /  AST  31  /  ALT  20  /  AlkPhos  108  11-24      Urinalysis Basic - ( 24 Nov 2024 06:18 )    Color: x / Appearance: x / SG: x / pH: x  Gluc: 159 mg/dL / Ketone: x  / Bili: x / Urobili: x   Blood: x / Protein: x / Nitrite: x   Leuk Esterase: x / RBC: x / WBC x   Sq Epi: x / Non Sq Epi: x / Bacteria: x      RADIOLOGY & ADDITIONAL STUDIES:  < from: Xray Abdomen 1 View PORTABLE -Urgent (Xray Abdomen 1 View PORTABLE -Urgent .) (11.14.24 @ 01:23) >  ACC: [id number] EXAM:  XR ABDOMEN PORTABLE URGENT 1V   ORDERED BY: ALLY COBOS     PROCEDURE DATE:  11/14/2024          INTERPRETATION:  CLINICAL INDICATION: GJ Tube Placement    TECHNIQUE: Frontal radiograph of the abdomen.    COMPARISON: CT abdomen and pelvis 11/11/2024, x-ray chest and abdomen   11/9/2024    INTERPRETATION:    Midline abdominal coil material. Surgical clips overlie the left upper   quadrant.    Gastrojejunostomy tubing overlies the abdomen. Oral contrast within loops   of small bowel in the left hemiabdomen. No obstruction or extravasation.    IMPRESSION:    Oral contrast and loops of small bowel within the left hemiabdomen. No   obstruction or extravasation.    --- End of Report ---           FAUSTINO ZHANG MD; ResidentRadiologist  This document has been electronically signed.  MARK EDWARDS MD; Attending Radiologist  This document has been electronically signed. Nov 14 2024  9:43AM    < end of copied text >    Protein Calorie Malnutrition Present: [ ]mild [ ]moderate [ ]severe [ ]underweight [ ]morbid obesity  https://www.andeal.org/vault/2440/web/files/ONC/Table_Clinical%20Characteristics%20to%20Document%20Malnutrition-White%20JV%20et%20al%202012.pdf    Height (cm): 149.9 (11-13-24 @ 13:27), 152.4 (06-20-24 @ 11:41), 152.4 (06-03-24 @ 18:13)  Weight (kg): 54.4 (11-13-24 @ 13:27), 56 (10-02-24 @ 21:56), 54.3 (08-11-24 @ 15:58)  BMI (kg/m2): 24.2 (11-13-24 @ 13:27), 24.1 (10-02-24 @ 21:56), 23.4 (08-11-24 @ 15:58)    [x ]PPSV2 < or = 30%  [ ]significant weight loss [ ]poor nutritional intake [ ]anasarca[ ]Artificial Nutrition    Other REFERRALS:  [ ]Hospice  [ ]Child Life  [ ]Social Work  [ ]Case management [ ]Holistic Therapy     Goals of Care Document:

## 2024-11-25 NOTE — PROGRESS NOTE ADULT - NUTRITIONAL ASSESSMENT
Diet, NPO with Tube Feed:   Tube Feeding Modality: Jejunostomy  [employer] Peptide 1.5 (KFPEPT1.5RTH)  Total Volume for 24 Hours (mL): 960  Continuous  Until Goal Tube Feed Rate (mL per Hour): 40  Tube Feed Duration (in Hours): 24  Tube Feed Start Time: 06:00  Free Water Flush  Pump   Rate (mL per Hour): 20   Frequency: Every Hour  Free Water Flush Instructions:  20 ml free water flushes Q1hr  Alfred(7 Gm Arginine/7 Gm Glut/1.2 Gm HMB     Qty per Day:  2  No Carb Prosource (1pkg = 15gms Protein)     Qty per Day:  1  Banatrol TF     Qty per Day:  1/2 packet per daily (11-24-24 @ 12:59) [Active]      Please see RD assessment and/or follow up.  Managed by primary team as well

## 2024-11-25 NOTE — PROGRESS NOTE ADULT - PROBLEM SELECTOR PLAN 6
- Pt receives Enbrel injections q Thursday (will hold in setting of recent infection)  - Home prednisone resumed ( 5 mg daily)

## 2024-11-25 NOTE — PROGRESS NOTE ADULT - PROBLEM SELECTOR PLAN 5
- Patient on Lantus and Humalog at home    - Standing Ademalog Dcd / Cont Lantus   - Cont  ISS  - Endocrine following

## 2024-11-25 NOTE — PROGRESS NOTE ADULT - PROBLEM SELECTOR PLAN 4
- Amolodipine and hydralazine previously dcd in setting of hypotension   - Hydralazine IVP Resumed 11/22  - Continue to monitor BP

## 2024-11-25 NOTE — PROGRESS NOTE ADULT - PROBLEM SELECTOR PLAN 12
- Patients Daughter updated at bedside by RCU Team today  - [facility]ST Updated 11/19: Remains DNR / + Intubate, No Pressors / No transfer to ICU Setting   - Ongoing GOC discussions; Daughter continues to discuss options with family; currently cont medical management  - Palliative care continues to follow  - Family meeting to be held on 11/29 @ Noon

## 2024-11-25 NOTE — PROGRESS NOTE ADULT - PROBLEM SELECTOR PLAN 5
will continue to follow for goc  case discussed with RCU  Can be reached by TEAMS M-F 9-5 Loni Amador Any other time please page [phone] if needed

## 2024-11-25 NOTE — PROGRESS NOTE ADULT - ASSESSMENT
75 yo F w/h/o controlled T2DM (A1C 5.8%) on insulin at home. Also HTN, HLD, hypothyroidism, RA on Prednisone, Fibromyalgia, cerebral aneurysm s/p repair, chronic hypercapnic respiratory failure s/p trach (vent dependent) and chronic PEG 2022, recurrent C. diff infection (follows with Dr. 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 S/p antibiotics and s/p GJ tube exchanges on 10/14, s/p PEG replacement 10/21.   Endocrine consulted for Type 2 DM management while on tube feeding. Patient tolerating 24hr TFs at goal rate with BG mostly at goal while on present insulin doses. On contact isolation for  C diff. No hypoglycemia. Patient is now receiving palliative care but  family would like to keep care the same until 11/29 when family meeting will take place with the team. will continue checking BG and giving insulin per family request.    BG goal 100-200mg/dL inpatient.       Home DM medications: Lantus 35 units at HS, Humalog 26 units with # 1 feeding, 22 units with #2 tube feeding, 20 units with #3 tube feeding and  Humalog correction scale (  2 units for -823, 4 units for -250, 6 units for -300, 8units for -350, 10 units for -400, 12 units for -450)   while on [employer] formula 3 cans/day, slow bolus( run at 80 ml/hr total volume 960 ml/day> 1st can around 6:30-8:30, 2nd can around 3 pm, 3rd can around 8-9 pm) plus Banatrol 1/2 packet BID, was increasing to 1 packet BID, plus Kefir 10 oz/day ( divided in multiple times throughout the day).

## 2024-11-25 NOTE — PROGRESS NOTE ADULT - PROBLEM SELECTOR PLAN 1
- Patient now receiving palliative care services. Family meeting will be held on 11/29/24   Please monitor blood glucose values q 6 hours while on tube feeding or NPO  - Continue Lantus to 10u QAM  - will stop admelog 7units Q6hrs for now.  - continue Admelog correction scale q 6 hours to low dose while on 24 hour tube feeding   - Please inform Endocrine team for any changes in tube feeding,  steroid  - Please keep all IV abx in NS solution if possible!  Discharge Plan:  - TBD depending on final decisions of family and on  insulin requirement if discharged on tube feeding regimen (Bolus vs continuous tube feeding)   - If bolus tube feeding > Lantus plus Humalog   - Patient should have BGs checked 4x a day while on TFs.    Daughter aware to contact Endocrinologist if BG <70mg/dL x1 or >200mg/dL consistently or >400mg/dL x1.   - Followup with Dr Ruth: [facility]: [address] [place] [address]. [address]. [place] [address]. Tel: [phone] Telemedicine- daughter to schedule.   - Make sure pt has Rx for all DM supplies and insulin

## 2024-11-25 NOTE — PROGRESS NOTE ADULT - SUBJECTIVE AND OBJECTIVE BOX
Patient is a 74y old  Female who presents with a chief complaint of Patient admitted with Hypoxemia and episode of Bright red blood per rectum (24 Nov 2024 07:46)      Interval Events: No events reported overnight, AM ADMELOG held BGM (92)    REVIEW OF SYSTEMS:  [ ] Positive  [ ] All other systems negative  [ ] Unable to assess ROS because ________    Vital Signs Last 24 Hrs  T(C): 36.2 (11-25-24 @ 04:58), Max: 36.6 (11-24-24 @ 14:19)  T(F): 97.1 (11-25-24 @ 04:58), Max: 97.9 (11-24-24 @ 14:19)  HR: 64 (11-25-24 @ 09:17) (64 - 95)  BP: 143/61 (11-25-24 @ 04:58) (143/61 - 149/82)  RR: 18 (11-25-24 @ 04:58) (18 - 20)  SpO2: 99% (11-25-24 @ 09:17) (97% - 99%)    PHYSICAL EXAM:  HEENT:   [ ]Tracheostomy:  [ ]Pupils equal  [ ]No oral lesions  [ ]Abnormal    SKIN  [ ]No Rash  [ ] Abnormal  [ ] pressure    CARDIAC  [ ]Regular  [ ]Abnormal    PULMONARY  [ ]Bilateral Clear Breath Sounds  [ ]Normal Excursion  [ ]Abnormal    GI  [ ]PEG      [ ] +BS		              [ ]Soft, nondistended, nontender	  [ ]Abnormal    MUSCULOSKELETAL                                   [ ]Bedbound                 [ ]Abnormal    [ ]Ambulatory/OOB to chair                           EXTREMITIES                                         [ ]Normal  [ ]Edema                           NEUROLOGIC  [ ] Normal, non focal  [ ] Focal findings:    PSYCHIATRIC  [ ]Alert and appropriate  [ ] Sedated	 [ ]Agitated    :  Mcbride: [ ] Yes, if yes: Date of Placement:                   [  ] No    LINES: Central Lines [ ] Yes, if yes: Date of Placement                                     [  ] No    HOSPITAL MEDICATIONS:  MEDICATIONS  (STANDING):  albumin human 25% IVPB 100 milliLiter(s) IV Intermittent every 8 hours  artificial  tears Solution 1 Drop(s) Both EYES every 6 hours  Biotene Dry Mouth Oral Rinse 5 milliLiter(s) Swish and Spit every 6 hours  ceftolozane/tazobactam IVPB 3000 milliGRAM(s) IV Intermittent every 8 hours  chlorhexidine 0.12% Liquid 15 milliLiter(s) Oral Mucosa every 12 hours  chlorhexidine 4% Liquid 1 Application(s) Topical daily  diclofenac sodium 1% Gel 2 Gram(s) Topical every 6 hours  escitalopram 10 milliGRAM(s) Oral <User Schedule>  fentaNYL   Patch  12 MICROgram(s)/Hr 1 Patch Transdermal every 72 hours  fentaNYL   Patch  25 MICROgram(s)/Hr 1 Patch Transdermal every 72 hours  FIRST- Mouthwash  BLM 5 milliLiter(s) Swish and Spit every 8 hours  gabapentin Solution 250 milliGRAM(s) Oral <User Schedule>  hemorrhoidal Ointment 1 Application(s) Rectal at bedtime  hydrALAZINE Injectable 10 milliGRAM(s) IV Push every 8 hours  hydrocortisone sodium succinate Injectable 25 milliGRAM(s) IV Push daily  influenza  Vaccine (HIGH DOSE) 0.5 milliLiter(s) IntraMuscular once  insulin glargine Injectable (LANTUS) 10 Unit(s) SubCutaneous <User Schedule>  insulin lispro (ADMELOG) corrective regimen sliding scale   SubCutaneous every 6 hours  insulin lispro Injectable (ADMELOG) 7 Unit(s) SubCutaneous every 6 hours  lactobacillus acidophilus 1 Tablet(s) Oral <User Schedule>  levothyroxine 125 MICROGram(s) Oral <User Schedule>  lidocaine   4% Patch 4 Patch Transdermal every 24 hours  nystatin Powder 1 Application(s) Topical every 8 hours  sodium chloride 1 Gram(s) Oral every 8 hours  triamcinolone 0.1% Oral Paste 1 Application(s) Topical every 8 hours  vancomycin    Solution 125 milliGRAM(s) Oral every 6 hours  witch hazel Pads 1 Application(s) Topical every 12 hours    MEDICATIONS  (PRN):  acetaminophen   Oral Liquid .. 650 milliGRAM(s) Oral every 6 hours PRN Temp greater or equal to 38C (100.4F), Mild Pain (1 - 3)  aluminum hydroxide/magnesium hydroxide/simethicone Suspension 30 milliLiter(s) Enteral Tube every 4 hours PRN Dyspepsia  oxyCODONE    Solution 5 milliGRAM(s) Oral every 6 hours PRN Moderate Pain (4 - 6)      LABS:                        7.1    12.32 )-----------( 68       ( 24 Nov 2024 06:18 )             24.2     11-24    140  |  104  |  60[H]  ----------------------------<  159[H]  4.9   |  17[L]  |  0.60    Ca    8.0[L]      24 Nov 2024 06:18  Phos  4.2     11-24  Mg     1.8     11-24    TPro  5.5[L]  /  Alb  2.4[L]  /  TBili  1.1  /  DBili  x   /  AST  31  /  ALT  20  /  AlkPhos  108  11-24      Urinalysis Basic - ( 24 Nov 2024 06:18 )    Color: x / Appearance: x / SG: x / pH: x  Gluc: 159 mg/dL / Ketone: x  / Bili: x / Urobili: x   Blood: x / Protein: x / Nitrite: x   Leuk Esterase: x / RBC: x / WBC x   Sq Epi: x / Non Sq Epi: x / Bacteria: x          CAPILLARY BLOOD GLUCOSE    MICROBIOLOGY:     RADIOLOGY:  [ ] Reviewed and interpreted by me    Mode: AC/ CMV (Assist Control/ Continuous Mandatory Ventilation)  RR (machine): 20  TV (machine): 400  FiO2: 40  PEEP: 5  ITime: 1  MAP: 16  PIP: 48   Patient is a 74y old  Female who presents with a chief complaint of Patient admitted with Hypoxemia and episode of Bright red blood per rectum (24 Nov 2024 07:46)      Interval Events: No events reported overnight, AM ADMELOG held BGM (92)    REVIEW OF SYSTEMS:  [ ] Positive  [ ] All other systems negative  [x] Unable to assess ROS because patient not answering verbal questioning     Vital Signs Last 24 Hrs  T(C): 36.2 (11-25-24 @ 04:58), Max: 36.6 (11-24-24 @ 14:19)  T(F): 97.1 (11-25-24 @ 04:58), Max: 97.9 (11-24-24 @ 14:19)  HR: 64 (11-25-24 @ 09:17) (64 - 95)  BP: 143/61 (11-25-24 @ 04:58) (143/61 - 149/82)  RR: 18 (11-25-24 @ 04:58) (18 - 20)  SpO2: 99% (11-25-24 @ 09:17) (97% - 99%)    PHYSICAL EXAM:  HEENT:   [X] Tracheostomy: #8 distal XLT cuffed Shiley   [X] PERRLA  [ ] No oral lesions  [ ] Abnormal    SKIN  [ ] No Rash  [ ] Abnormal  [X] pressure + Sacral Wound    CARDIAC  [X] Regular  [ ] Abnormal    PULMONARY  [ ] Bilateral Clear Breath Sounds  [ ] Normal Excursion  [X] Abnormal - Diffuses coarse breath sounds / decreased at bases bilaterally     GI  [X] PEG site covered w/ dry dressing              [X] Soft, nondistended, nontender	  [X] Abnormal; old PEG stoma site with small opening c/d/i,     MUSCULOSKELETAL                                   [X] Bedbound                 [ ] Abnormal    [ ] Ambulatory/OOB to chair                           EXTREMITIES                                         [ ] Normal  [X] Edema - Anasarca                        NEUROLOGIC  [ ] Normal, non focal  [X] Focal findings: Obtunded, Minimally unresponsive to voice, grimacing occasionally, not following commands, no purposeful movements in all extremities, withdraws to noxious stimuli.    PSYCHIATRIC  [x ] Unable to assess  [] Sedated	 [ ]Agitated    :  Mcbride: [ ] Yes, if yes: Date of Placement:                   [X] No    LINES: Central Lines [ ] Yes, if yes: Date of Placement                                     [X] No      HOSPITAL MEDICATIONS:  MEDICATIONS  (STANDING):  albumin human 25% IVPB 100 milliLiter(s) IV Intermittent every 8 hours  artificial  tears Solution 1 Drop(s) Both EYES every 6 hours  Biotene Dry Mouth Oral Rinse 5 milliLiter(s) Swish and Spit every 6 hours  ceftolozane/tazobactam IVPB 3000 milliGRAM(s) IV Intermittent every 8 hours  chlorhexidine 0.12% Liquid 15 milliLiter(s) Oral Mucosa every 12 hours  chlorhexidine 4% Liquid 1 Application(s) Topical daily  diclofenac sodium 1% Gel 2 Gram(s) Topical every 6 hours  escitalopram 10 milliGRAM(s) Oral <User Schedule>  fentaNYL   Patch  12 MICROgram(s)/Hr 1 Patch Transdermal every 72 hours  fentaNYL   Patch  25 MICROgram(s)/Hr 1 Patch Transdermal every 72 hours  FIRST- Mouthwash  BLM 5 milliLiter(s) Swish and Spit every 8 hours  gabapentin Solution 250 milliGRAM(s) Oral <User Schedule>  hemorrhoidal Ointment 1 Application(s) Rectal at bedtime  hydrALAZINE Injectable 10 milliGRAM(s) IV Push every 8 hours  hydrocortisone sodium succinate Injectable 25 milliGRAM(s) IV Push daily  influenza  Vaccine (HIGH DOSE) 0.5 milliLiter(s) IntraMuscular once  insulin glargine Injectable (LANTUS) 10 Unit(s) SubCutaneous <User Schedule>  insulin lispro (ADMELOG) corrective regimen sliding scale   SubCutaneous every 6 hours  insulin lispro Injectable (ADMELOG) 7 Unit(s) SubCutaneous every 6 hours  lactobacillus acidophilus 1 Tablet(s) Oral <User Schedule>  levothyroxine 125 MICROGram(s) Oral <User Schedule>  lidocaine   4% Patch 4 Patch Transdermal every 24 hours  nystatin Powder 1 Application(s) Topical every 8 hours  sodium chloride 1 Gram(s) Oral every 8 hours  triamcinolone 0.1% Oral Paste 1 Application(s) Topical every 8 hours  vancomycin    Solution 125 milliGRAM(s) Oral every 6 hours  witch hazel Pads 1 Application(s) Topical every 12 hours    MEDICATIONS  (PRN):  acetaminophen   Oral Liquid .. 650 milliGRAM(s) Oral every 6 hours PRN Temp greater or equal to 38C (100.4F), Mild Pain (1 - 3)  aluminum hydroxide/magnesium hydroxide/simethicone Suspension 30 milliLiter(s) Enteral Tube every 4 hours PRN Dyspepsia  oxyCODONE    Solution 5 milliGRAM(s) Oral every 6 hours PRN Moderate Pain (4 - 6)      LABS:                        7.1    12.32 )-----------( 68       ( 24 Nov 2024 06:18 )             24.2     11-24    140  |  104  |  60[H]  ----------------------------<  159[H]  4.9   |  17[L]  |  0.60    Ca    8.0[L]      24 Nov 2024 06:18  Phos  4.2     11-24  Mg     1.8     11-24    TPro  5.5[L]  /  Alb  2.4[L]  /  TBili  1.1  /  DBili  x   /  AST  31  /  ALT  20  /  AlkPhos  108  11-24      Urinalysis Basic - ( 24 Nov 2024 06:18 )    Color: x / Appearance: x / SG: x / pH: x  Gluc: 159 mg/dL / Ketone: x  / Bili: x / Urobili: x   Blood: x / Protein: x / Nitrite: x   Leuk Esterase: x / RBC: x / WBC x   Sq Epi: x / Non Sq Epi: x / Bacteria: x          CAPILLARY BLOOD GLUCOSE    MICROBIOLOGY:     RADIOLOGY:  [ ] Reviewed and interpreted by me    Mode: AC/ CMV (Assist Control/ Continuous Mandatory Ventilation)  RR (machine): 20  TV (machine): 400  FiO2: 40  PEEP: 5  ITime: 1  MAP: 16  PIP: 48   Patient is a 74y old  Female who presents with a chief complaint of Patient admitted with Hypoxemia and episode of Bright red blood per rectum (24 Nov 2024 07:46)      Interval Events: No events reported overnight, AM ADMELOG held BGM (92)    REVIEW OF SYSTEMS:  [ ] Positive  [ ] All other systems negative  [x] Unable to assess ROS because patient not answering verbal questioning     Vital Signs Last 24 Hrs  T(C): 36.2 (11-25-24 @ 04:58), Max: 36.6 (11-24-24 @ 14:19)  T(F): 97.1 (11-25-24 @ 04:58), Max: 97.9 (11-24-24 @ 14:19)  HR: 64 (11-25-24 @ 09:17) (64 - 95)  BP: 143/61 (11-25-24 @ 04:58) (143/61 - 149/82)  RR: 18 (11-25-24 @ 04:58) (18 - 20)  SpO2: 99% (11-25-24 @ 09:17) (97% - 99%)    PHYSICAL EXAM:  HEENT:   [X] Tracheostomy: #8 distal XLT cuffed Shiley   [X] PERRLA  [ ] No oral lesions  [ ] Abnormal    SKIN  [ ] No Rash  [ ] Abnormal  [X] Pressure + Sacral Wound    CARDIAC  [X] Regular  [ ] Abnormal    PULMONARY  [ ] Bilateral Clear Breath Sounds  [ ] Normal Excursion  [X] Abnormal: Diffuses coarse breath sounds / decreased at bases bilaterally     GI  [X] PEG site covered w/ dry dressing              [X] Soft, nondistended, nontender	  [X] Abnormal; old PEG stoma site with small opening c/d/i,     MUSCULOSKELETAL                                   [X] Bedbound                 [ ] Abnormal    [ ] Ambulatory/OOB to chair                           EXTREMITIES                                         [ ] Normal  [X] Edema - Anasarca                        NEUROLOGIC  [ ] Normal, non focal  [X] Focal findings: Obtunded, Minimally unresponsive to voice, grimacing occasionally, not following commands, no purposeful movements in all extremities, withdraws to noxious stimuli.    PSYCHIATRIC  [X] Unable to assess  [] Sedated	 [ ]Agitated    :  Mcbride: [ ] Yes, if yes: Date of Placement:                   [X] No    LINES: Central Lines [ ] Yes, if yes: Date of Placement                                     [X] No      HOSPITAL MEDICATIONS:  MEDICATIONS  (STANDING):  albumin human 25% IVPB 100 milliLiter(s) IV Intermittent every 8 hours  artificial  tears Solution 1 Drop(s) Both EYES every 6 hours  Biotene Dry Mouth Oral Rinse 5 milliLiter(s) Swish and Spit every 6 hours  ceftolozane/tazobactam IVPB 3000 milliGRAM(s) IV Intermittent every 8 hours  chlorhexidine 0.12% Liquid 15 milliLiter(s) Oral Mucosa every 12 hours  chlorhexidine 4% Liquid 1 Application(s) Topical daily  diclofenac sodium 1% Gel 2 Gram(s) Topical every 6 hours  escitalopram 10 milliGRAM(s) Oral <User Schedule>  fentaNYL   Patch  12 MICROgram(s)/Hr 1 Patch Transdermal every 72 hours  fentaNYL   Patch  25 MICROgram(s)/Hr 1 Patch Transdermal every 72 hours  FIRST- Mouthwash  BLM 5 milliLiter(s) Swish and Spit every 8 hours  gabapentin Solution 250 milliGRAM(s) Oral <User Schedule>  hemorrhoidal Ointment 1 Application(s) Rectal at bedtime  hydrALAZINE Injectable 10 milliGRAM(s) IV Push every 8 hours  hydrocortisone sodium succinate Injectable 25 milliGRAM(s) IV Push daily  influenza  Vaccine (HIGH DOSE) 0.5 milliLiter(s) IntraMuscular once  insulin glargine Injectable (LANTUS) 10 Unit(s) SubCutaneous <User Schedule>  insulin lispro (ADMELOG) corrective regimen sliding scale   SubCutaneous every 6 hours  insulin lispro Injectable (ADMELOG) 7 Unit(s) SubCutaneous every 6 hours  lactobacillus acidophilus 1 Tablet(s) Oral <User Schedule>  levothyroxine 125 MICROGram(s) Oral <User Schedule>  lidocaine   4% Patch 4 Patch Transdermal every 24 hours  nystatin Powder 1 Application(s) Topical every 8 hours  sodium chloride 1 Gram(s) Oral every 8 hours  triamcinolone 0.1% Oral Paste 1 Application(s) Topical every 8 hours  vancomycin    Solution 125 milliGRAM(s) Oral every 6 hours  witch hazel Pads 1 Application(s) Topical every 12 hours    MEDICATIONS  (PRN):  acetaminophen   Oral Liquid .. 650 milliGRAM(s) Oral every 6 hours PRN Temp greater or equal to 38C (100.4F), Mild Pain (1 - 3)  aluminum hydroxide/magnesium hydroxide/simethicone Suspension 30 milliLiter(s) Enteral Tube every 4 hours PRN Dyspepsia  oxyCODONE    Solution 5 milliGRAM(s) Oral every 6 hours PRN Moderate Pain (4 - 6)      LABS:                        7.1    12.32 )-----------( 68       ( 24 Nov 2024 06:18 )             24.2     11-24    140  |  104  |  60[H]  ----------------------------<  159[H]  4.9   |  17[L]  |  0.60    Ca    8.0[L]      24 Nov 2024 06:18  Phos  4.2     11-24  Mg     1.8     11-24    TPro  5.5[L]  /  Alb  2.4[L]  /  TBili  1.1  /  DBili  x   /  AST  31  /  ALT  20  /  AlkPhos  108  11-24      Urinalysis Basic - ( 24 Nov 2024 06:18 )    Color: x / Appearance: x / SG: x / pH: x  Gluc: 159 mg/dL / Ketone: x  / Bili: x / Urobili: x   Blood: x / Protein: x / Nitrite: x   Leuk Esterase: x / RBC: x / WBC x   Sq Epi: x / Non Sq Epi: x / Bacteria: x          CAPILLARY BLOOD GLUCOSE    MICROBIOLOGY:     RADIOLOGY:  [ ] Reviewed and interpreted by me    Mode: AC/ CMV (Assist Control/ Continuous Mandatory Ventilation)  RR (machine): 20  TV (machine): 400  FiO2: 40  PEEP: 5  ITime: 1  MAP: 16  PIP: 48

## 2024-11-25 NOTE — PROGRESS NOTE ADULT - NS ATTEND AMEND GEN_ALL_CORE FT
75 yo F w/ PMH DM2 (insulin-dependent), HTN, HLD, hypothyroidism, RA on Prednisone, fibromyalgia, cerebral aneurysm s/p repair, chronic hypoxemia and hypercapnic respiratory failure s/p trach, vent-dependent, recurrent C diff infection, oropharyngeal dysphagia s/p G-J tube with persistent GJ tube leaks now s/p GC fistula repair 8/12, and recent presentation 5 days PTA for rectal bleeding requiring transfusion in the ED who now presents with acute hypoxemic respiratory distress 2/2 RML PNA, admitted to the RCU for further management. Found to have Pseudomonas aeruginosa in sputum culture and urine culture with ESBL E. coli s/p course of meropenem. Course complicated by antibiotic-associated diarrhea.     #Neuro: obtunded; opens eyes intermittently; much less responsive than prior; likely from metabolic encephalopathy  #CV: prior septic shock on midodrine; now improved; dc midodrine. PRN hydral for hypertension  #Pulm: remains on full support on the vent  #ID: completed treatment today fro  PsA PNA and recurrent  C diff with zerbaxa and oral vanco respectively  #Renal/Fluid: monitor I/O;   #Heme: hemorrhoids, AOCD. s/p EGD without active bleeding, transfuse for Hb < 7  #Endo: will taper off hydrocortisone and transition to home prednisone    #GI: tolerating tube feeds; diarrhea improved  # Skin/Lines: sacral decub- wound care  #DVT ppx: SCDs; hold chemical dvt ppx with anemia:  # Dispo/Ethics:  Lengthy conversation held with  RCU BERNA styles, palliative NP Loni and patient's daughter Marysol and patient's . It remains unclear what the ultimate goal is with patient. Discussed that she has been hospitalized for 2 months now, decompensating after every infectious illness and has not returned to her baseline. Will continue to speak with family to help elucidate further goals of care- plan for family discussion on Friday. Maintian current level of care at this time.

## 2024-11-25 NOTE — PROGRESS NOTE ADULT - SUBJECTIVE AND OBJECTIVE BOX
Follow Up:  pneumonia    Interval History/ROS:  lethargic,  sleeping this am    Allergies  walnut (Unknown)  metronidazole (Rash)  Lyrica (Unknown)  penicillin (Unknown)  Pineapple (Unknown)  Tagamet (Unknown)  heparin (Unknown)  Pecans (Unknown)  Hazelnut (Unknown)  meropenem (Rash)        ANTIMICROBIALS:      OTHER MEDS:  MEDICATIONS  (STANDING):  acetaminophen   Oral Liquid .. 650 every 6 hours PRN  aluminum hydroxide/magnesium hydroxide/simethicone Suspension 30 every 4 hours PRN  escitalopram 10 <User Schedule>  fentaNYL   Patch  12 MICROgram(s)/Hr 1 every 72 hours  fentaNYL   Patch  25 MICROgram(s)/Hr 1 every 72 hours  gabapentin Solution 250 <User Schedule>  hydrALAZINE Injectable 10 every 8 hours  influenza  Vaccine (HIGH DOSE) 0.5 once  insulin glargine Injectable (LANTUS) 10 <User Schedule>  insulin lispro (ADMELOG) corrective regimen sliding scale  every 6 hours  levothyroxine 125 <User Schedule>  oxyCODONE    Solution 5 every 6 hours PRN      Vital Signs Last 24 Hrs  T(C): 36.2 (25 Nov 2024 17:30), Max: 36.3 (25 Nov 2024 12:05)  T(F): 97.1 (25 Nov 2024 17:30), Max: 97.3 (25 Nov 2024 12:05)  HR: 81 (25 Nov 2024 17:30) (64 - 81)  BP: 167/87 (25 Nov 2024 17:30) (143/61 - 180/69)  BP(mean): --  RR: 19 (25 Nov 2024 17:30) (18 - 20)  SpO2: 99% (25 Nov 2024 17:30) (98% - 100%)    Parameters below as of 25 Nov 2024 17:30  Patient On (Oxygen Delivery Method): ventilator        PHYSICAL EXAM:  General:  NAD, Non-toxic  Neurology: asleep  Respiratory: fine rhonchi  CV: RRR, S1S2, no murmurs, rubs or gallops  Abdominal: Soft, Non-tender, non-distended, normal bowel sounds  Extremities: generalized edema,  Line Sites: Clear  Skin: No rash                          7.1    12.32 )-----------( 68       ( 24 Nov 2024 06:18 )             24.2   WBC Count: 12.32 (11-24 @ 06:18)  WBC Count: 14.68 (11-23 @ 07:15)  WBC Count: 13.93 (11-22 @ 06:33)  WBC Count: 15.39 (11-21 @ 10:32)    11-24    140  |  104  |  60[H]  ----------------------------<  159[H]  4.9   |  17[L]  |  0.60    Ca    8.0[L]      24 Nov 2024 06:18  Phos  4.2     11-24  Mg     1.8     11-24    TPro  5.5[L]  /  Alb  2.4[L]  /  TBili  1.1  /  DBili  x   /  AST  31  /  ALT  20  /  AlkPhos  108  11-24    (11.23.24 @ 07:17) Procalcitonin: 2.18   (11.22.24 @ 06:35) Procalcitonin: 3.69   (11.21.24 @ 07:09) Procalcitonin: 6.00   (11.20.24 @ 06:38) Procalcitonin: 12.11:        MICROBIOLOGY:  Catheterized Catheterized  11-18-24   >100,000 CFU/ml Enterococcus faecium (vancomycin resistant)  --  Enterococcus faecium (vancomycin resistant)      .Blood BLOOD  11-18-24   No growth at 5 days  --  --      .Sputum Sputum  11-18-24   Mixed gram negative rods including  Numerous Pseudomonas aeruginosa (Carbapenem Resistant) Multiple  Morphological Strains  Commensal vinay consistent with body site  --  Pseudomonas aeruginosa (Carbapenem Resistant)  Pseudomonas aeruginosa (Carbapenem Resistant)      .Blood BLOOD  11-18-24   No growth at 5 days  --  --      Trach Asp Tracheal Aspirate  11-10-24   Few Pseudomonas aeruginosa (Carbapenem Resistant)  Commensal vinay consistent with body site  --  Pseudomonas aeruginosa (Carbapenem Resistant)      .Blood BLOOD  11-10-24   No growth at 5 days  --  --      .Sputum Sputum  11-04-24   Moderate Mixed gram negative rods including  Moderate Pseudomonas aeruginosa  Commensal vinay consistent with body site  --  Pseudomonas aeruginosa      Clean Catch Clean Catch (Midstream)  11-03-24   No growth  --  --      .Blood BLOOD  11-03-24   No growth at 5 days  --  --      .Blood BLOOD  11-03-24   No growth at 5 days  --  --      .Blood BLOOD  11-01-24   Growth in anaerobic bottle: Staphylococcus epidermidis  Isolation of Coagulase negative Staphylococcus from single blood culture  sets may represent  contamination. Contact the Microbiology Department at [phone] if  susceptibility testing is needed.  clinically indicated.  Direct identification is available within approximately 3-5  hours either by Blood Panel Multiplexed PCR or Direct  MALDI-TOF. Details: [email or web address]  --  Blood Culture PCR      Catheterized Catheterized  11-01-24   No growth  --  --      .Blood BLOOD  11-01-24   No growth at 5 days  --  --      Trach Asp Tracheal Aspirate  10-27-24   Moderate Pseudomonas aeruginosa  Moderate Pseudomonas aeruginosa #2  Multiple Morphological Strains  Commensal vinay consistent with body site  --  Pseudomonas aeruginosa  Pseudomonas aeruginosa      .Blood BLOOD  10-27-24   No growth at 5 days  --  --          v          RADIOLOGY:    Zion Newman MD; Division of Infectious Disease; Pager: [phone]; nights and weekends: [phone]

## 2024-11-25 NOTE — PROGRESS NOTE ADULT - PROBLEM SELECTOR PLAN 2
Wound Consult requested to assist w/ management of  Sacral/bilateral Buttocks chronic stage 4 pressure injury now resolved with some denuded areas of moisture  Left ischium chronic stage 4 pressure injury now resolved  Incontinence Dermatitis  BL hallux and Lt 5th toe DTPI  -> defer to wound care.

## 2024-11-25 NOTE — PROGRESS NOTE ADULT - ASSESSMENT
75 yo F PMH T2DM on insulin, HTN, HLD, hypothyroidism, RA on Prednisone, Fibromyalgia, cerebral aneurysm s/p repair, chronic hypercapnic respiratory failure s/p trach (vent dependent) and chronic PEG 2022, recurrent C. diff infection (follows with Dr. Newman), persistent GJ tube leaks now s/p GC fistula repair 8/12 BIBEMS on 10/7 with daughter for respiratory distress, hypoxia to 88%.  Pt also noted to have rectal bleeding week prior to presentation requiring transfusion 5 days ago in ED as per daughter. Daughter also reports brownish discharge from G-J tube. In ED, pt afebrile, fiO2 96-98% on 40% on ventilator.  Chest X-ray w/ opacification of right lung concerning for possible PNA.  Admitted to RCU for further management. Sputum cxs grew PSA; treated with course of Cefepime. Patient with decreased H+H received 1 unit of PRBCS on 10/10.  10/14 EGD w/ Dr. Rosales for PEGJ exchange and clippings placed for closure of fistula site.  Persistent fevers treated with meropenem and vanc (d/c'd 10/20).  CT A/P without infectious source.  Continued fevers and persistent leukocytosis 11/1 CDiff positive - po vanco started, IV flagyl added (11/3-11/8). Head CT performed on 11/6 due to concern of lethargy no acute intracranial findings were noted; likely related to metabolic encephalopathy. Patient continued to have persistent fevers and was empirically treated with Cefepime (11/5-11/9) for CR PSA in sputum cx. CT C/A/P performed 11/6 c/w consolidative opacity LLL. Evening of 11/10-11/11, WBC 15 --> 21, procal 1 --> 43, lactate 3.2 -- pt also febrile. Infectious w/u sent 11/10 -- BCx negative, trach aspirate cx pending, UA negative. CT CAP on 11/11 showed left greater than right basilar consolidation and patchy ground glass opacity in both lungs consistent with atelectasis and/or pneumonia; Feeding tube coils in the stomach with tip within the first portion of the duodenum, No bowel obstruction. Cefepime restarted 11/11-11/15 with improvement in leukocytosis. Patient underwent Endoscopic adjustment of peg 11/13 with Dr. Rosales. XR tube check performed. Patient became Septic again on 11/19 requiring Midodrine, Solucortef and HC03 drip; Patient was empirically placed on Zerbaxa.     11/25: No events reported overnight. Patients AM Ademalog held in setting of borderline BGM. Endo follow up appreciated will hold q 6 hrs Ademalog and continue ISS. Case d/w ID Zerbaxa and Enteral Vanco to complete tonight. Patients BP remains stable will dc Solu-cortef and place back patient on home regimen of prednisone 5 mg daily. Ongoing GOC discussions with daughter and  today; planned for Palliative / RCU meeting with children ( Daughters/ Son) and  on 11/29 @ Noon. In the meantime Daughter wants to continue same level of care.

## 2024-11-25 NOTE — PROGRESS NOTE ADULT - PROBLEM SELECTOR PLAN 1
- Patient with Chronic Trach at baseline   - Patient presented to ED with Hypoxemia in setting of PNA   - Mechanical vent: Volume ctrl  AC /CMV:  RR: 20 / PEEP: 5 fio2 40%  - Continue Chest PT / Suctioning PRN

## 2024-11-26 NOTE — PROGRESS NOTE ADULT - ASSESSMENT
73 yo F PMH T2DM on insulin, HTN, HLD, hypothyroidism, RA on Prednisone, Fibromyalgia, cerebral aneurysm s/p repair, chronic hypercapnic respiratory failure s/p trach (vent dependent) and chronic PEG 2022, recurrent C. diff infection (follows with Dr. Newman), persistent GJ tube leaks now s/p GC fistula repair 8/12 BIBEMS on 10/7 with daughter for respiratory distress, hypoxia to 88%.  Pt also noted to have rectal bleeding week prior to presentation requiring transfusion 5 days ago in ED as per daughter. Daughter also reports brownish discharge from G-J tube. In ED, pt afebrile, fiO2 96-98% on 40% on ventilator.  Chest X-ray w/ opacification of right lung concerning for possible PNA.  Admitted to RCU for further management. Sputum cxs grew PSA; treated with course of Cefepime. Patient with decreased H+H received 1 unit of PRBCS on 10/10.  10/14 EGD w/ Dr. Rosales for PEGJ exchange and clippings placed for closure of fistula site.  Persistent fevers treated with meropenem and vanc (d/c'd 10/20).  CT A/P without infectious source.  Continued fevers and persistent leukocytosis 11/1 CDiff positive - po vanco started, IV flagyl added (11/3-11/8). Head CT performed on 11/6 due to concern of lethargy no acute intracranial findings were noted; likely related to metabolic encephalopathy. Patient continued to have persistent fevers and was empirically treated with Cefepime (11/5-11/9) for CR PSA in sputum cx. CT C/A/P performed 11/6 c/w consolidative opacity LLL. Evening of 11/10-11/11, WBC 15 --> 21, procal 1 --> 43, lactate 3.2 -- pt also febrile. Infectious w/u sent 11/10 -- BCx negative, trach aspirate cx pending, UA negative. CT CAP on 11/11 showed left greater than right basilar consolidation and patchy ground glass opacity in both lungs consistent with atelectasis and/or pneumonia; Feeding tube coils in the stomach with tip within the first portion of the duodenum, No bowel obstruction. Cefepime restarted 11/11-11/15 with improvement in leukocytosis. Patient underwent Endoscopic adjustment of peg 11/13 with Dr. Rosales. XR tube check performed. Patient became Septic again on 11/19 requiring Midodrine, Solucortef and HC03 drip; Patient was empirically placed on Zerbaxa.     11/25: No events reported overnight. Patients AM Ademalog held in setting of borderline BGM. Endo follow up appreciated will hold q 6 hrs Ademalog and continue ISS. Case d/w ID Zerbaxa and Enteral Vanco to complete tonight. Patients BP remains stable will dc Solu-cortef and place back patient on home regimen of prednisone 5 mg daily. Ongoing GOC discussions with daughter and  today; planned for Palliative / RCU meeting with children ( Daughters/ Son) and  on 11/29 @ Noon. In the meantime Daughter wants to continue same level of care.

## 2024-11-26 NOTE — PROGRESS NOTE ADULT - SUBJECTIVE AND OBJECTIVE BOX
Patient is a 74y old  Female who presents with a chief complaint of Patient admitted with Hypoxemia and episode of Bright red blood per rectum (24 Nov 2024 07:46)      Interval Events: No events reported overnight, AM ADMELOG held BGM (92)    REVIEW OF SYSTEMS:  [ ] Positive  [ ] All other systems negative  [x] Unable to assess ROS because patient not answering verbal questioning     Vital Signs Last 24 Hrs  T(C): 36.2 (11-25-24 @ 04:58), Max: 36.6 (11-24-24 @ 14:19)  T(F): 97.1 (11-25-24 @ 04:58), Max: 97.9 (11-24-24 @ 14:19)  HR: 64 (11-25-24 @ 09:17) (64 - 95)  BP: 143/61 (11-25-24 @ 04:58) (143/61 - 149/82)  RR: 18 (11-25-24 @ 04:58) (18 - 20)  SpO2: 99% (11-25-24 @ 09:17) (97% - 99%)    PHYSICAL EXAM:  HEENT:   [X] Tracheostomy: #8 distal XLT cuffed Shiley   [X] PERRLA  [ ] No oral lesions  [ ] Abnormal    SKIN  [ ] No Rash  [ ] Abnormal  [X] Pressure + Sacral Wound    CARDIAC  [X] Regular  [ ] Abnormal    PULMONARY  [ ] Bilateral Clear Breath Sounds  [ ] Normal Excursion  [X] Abnormal: Diffuses coarse breath sounds / decreased at bases bilaterally     GI  [X] PEG site covered w/ dry dressing              [X] Soft, nondistended, nontender	  [X] Abnormal; old PEG stoma site with small opening c/d/i,     MUSCULOSKELETAL                                   [X] Bedbound                 [ ] Abnormal    [ ] Ambulatory/OOB to chair                           EXTREMITIES                                         [ ] Normal  [X] Edema - Anasarca                        NEUROLOGIC  [ ] Normal, non focal  [X] Focal findings: Obtunded, Minimally unresponsive to voice, grimacing occasionally, not following commands, no purposeful movements in all extremities, withdraws to noxious stimuli.    PSYCHIATRIC  [X] Unable to assess  [] Sedated	 [ ]Agitated    :  Mcbride: [ ] Yes, if yes: Date of Placement:                   [X] No    LINES: Central Lines [ ] Yes, if yes: Date of Placement                                     [X] No      HOSPITAL MEDICATIONS:  MEDICATIONS  (STANDING):  albumin human 25% IVPB 100 milliLiter(s) IV Intermittent every 8 hours  artificial  tears Solution 1 Drop(s) Both EYES every 6 hours  Biotene Dry Mouth Oral Rinse 5 milliLiter(s) Swish and Spit every 6 hours  ceftolozane/tazobactam IVPB 3000 milliGRAM(s) IV Intermittent every 8 hours  chlorhexidine 0.12% Liquid 15 milliLiter(s) Oral Mucosa every 12 hours  chlorhexidine 4% Liquid 1 Application(s) Topical daily  diclofenac sodium 1% Gel 2 Gram(s) Topical every 6 hours  escitalopram 10 milliGRAM(s) Oral <User Schedule>  fentaNYL   Patch  12 MICROgram(s)/Hr 1 Patch Transdermal every 72 hours  fentaNYL   Patch  25 MICROgram(s)/Hr 1 Patch Transdermal every 72 hours  FIRST- Mouthwash  BLM 5 milliLiter(s) Swish and Spit every 8 hours  gabapentin Solution 250 milliGRAM(s) Oral <User Schedule>  hemorrhoidal Ointment 1 Application(s) Rectal at bedtime  hydrALAZINE Injectable 10 milliGRAM(s) IV Push every 8 hours  hydrocortisone sodium succinate Injectable 25 milliGRAM(s) IV Push daily  influenza  Vaccine (HIGH DOSE) 0.5 milliLiter(s) IntraMuscular once  insulin glargine Injectable (LANTUS) 10 Unit(s) SubCutaneous <User Schedule>  insulin lispro (ADMELOG) corrective regimen sliding scale   SubCutaneous every 6 hours  insulin lispro Injectable (ADMELOG) 7 Unit(s) SubCutaneous every 6 hours  lactobacillus acidophilus 1 Tablet(s) Oral <User Schedule>  levothyroxine 125 MICROGram(s) Oral <User Schedule>  lidocaine   4% Patch 4 Patch Transdermal every 24 hours  nystatin Powder 1 Application(s) Topical every 8 hours  sodium chloride 1 Gram(s) Oral every 8 hours  triamcinolone 0.1% Oral Paste 1 Application(s) Topical every 8 hours  vancomycin    Solution 125 milliGRAM(s) Oral every 6 hours  witch hazel Pads 1 Application(s) Topical every 12 hours    MEDICATIONS  (PRN):  acetaminophen   Oral Liquid .. 650 milliGRAM(s) Oral every 6 hours PRN Temp greater or equal to 38C (100.4F), Mild Pain (1 - 3)  aluminum hydroxide/magnesium hydroxide/simethicone Suspension 30 milliLiter(s) Enteral Tube every 4 hours PRN Dyspepsia  oxyCODONE    Solution 5 milliGRAM(s) Oral every 6 hours PRN Moderate Pain (4 - 6)      LABS:                        7.1    12.32 )-----------( 68       ( 24 Nov 2024 06:18 )             24.2     11-24    140  |  104  |  60[H]  ----------------------------<  159[H]  4.9   |  17[L]  |  0.60    Ca    8.0[L]      24 Nov 2024 06:18  Phos  4.2     11-24  Mg     1.8     11-24    TPro  5.5[L]  /  Alb  2.4[L]  /  TBili  1.1  /  DBili  x   /  AST  31  /  ALT  20  /  AlkPhos  108  11-24      Urinalysis Basic - ( 24 Nov 2024 06:18 )    Color: x / Appearance: x / SG: x / pH: x  Gluc: 159 mg/dL / Ketone: x  / Bili: x / Urobili: x   Blood: x / Protein: x / Nitrite: x   Leuk Esterase: x / RBC: x / WBC x   Sq Epi: x / Non Sq Epi: x / Bacteria: x          CAPILLARY BLOOD GLUCOSE    MICROBIOLOGY:     RADIOLOGY:  [ ] Reviewed and interpreted by me    Mode: AC/ CMV (Assist Control/ Continuous Mandatory Ventilation)  RR (machine): 20  TV (machine): 400  FiO2: 40  PEEP: 5  ITime: 1  MAP: 16  PIP: 48

## 2024-11-26 NOTE — PROGRESS NOTE ADULT - NS ATTEND AMEND GEN_ALL_CORE FT
74F w/ PMH DM2 (insulin-dependent), HTN, HLD, hypothyroidism, RA on Prednisone, fibromyalgia, cerebral aneurysm s/p repair, chronic hypoxemia and hypercapnic respiratory failure s/p trach, vent-dependent, recurrent C diff infection, oropharyngeal dysphagia s/p G-J tube with persistent GJ tube leaks now s/p GC fistula repair 8/12, and recent presentation 5 days PTA for rectal bleeding requiring transfusion in the ED who now presents with acute hypoxemic respiratory distress 2/2 RML PNA, admitted to the RCU for further management. Found to have Pseudomonas aeruginosa in sputum culture and urine culture with ESBL E. coli s/p course of meropenem. Course complicated by antibiotic-associated diarrhea.     #Neuro: obtunded; opens eyes intermittently; much less responsive than prior; likely from metabolic encephalopathy  #CV: prior septic shock on midodrine; now improved; dc midodrine. PRN hydral for hypertension  #Pulm: remains on full support on the vent  #ID: completed treatment on 11/25 for  PsA PNA and recurrent  C diff with zerbaxa and oral vanco respectively  #Renal/Fluid: monitor I/O  #Heme: anemia from hemorrhoids, AOCD. s/p EGD without active bleeding, transfuse for Hb < 7  #Endo: transitioned to home prednisone    #GI: tolerating tube feeds; diarrhea improved  # Skin/Lines: sacral decub- wound care  #DVT ppx: SCDs; hold chemical dvt ppx with anemia:  # Dispo/Ethics:  Lengthy conversation held with  RCU BERNA styles, palliative NP Loni and patient's daughter Marysol and patient's  on 11/25. . It remains unclear what the ultimate goal is with patient. Discussed that she has been hospitalized for 2 months now, decompensating after every infectious illness and has not returned to her baseline. Will continue to speak with family to help elucidate further goals of care- plan for family discussion on Friday. Maintain current level of care at this time. 74F w/ PMH DM2 (insulin-dependent), HTN, HLD, hypothyroidism, RA on Prednisone, fibromyalgia, cerebral aneurysm s/p repair, chronic hypoxemia and hypercapnic respiratory failure s/p trach, vent-dependent, recurrent C diff infection, oropharyngeal dysphagia s/p G-J tube with persistent GJ tube leaks now s/p GC fistula repair 8/12, and recent presentation 5 days PTA for rectal bleeding requiring transfusion in the ED who now presents with acute hypoxemic respiratory distress 2/2 RML PNA, admitted to the RCU for further management. Found to have Pseudomonas aeruginosa in sputum culture and urine culture with ESBL E. coli s/p course of meropenem. Course complicated by antibiotic-associated diarrhea.     #Neuro: improved mentation today, answering yes/no questions. reports mouth pain on left side  #CV: prior septic shock on midodrine; now improved; dc midodrine. PRN hydral for hypertension  #Pulm: remains on full support on the vent. start chest PT q 12 hours  #ID: completed treatment on 11/25 for  PsA PNA and recurrent  C diff with zerbaxa and oral vanco respectively  #Renal/Fluid: monitor I/O  #Heme: anemia from hemorrhoids, AOCD. s/p EGD without active bleeding, transfuse for Hb < 7  #Endo: transitioned to home prednisone    #GI: tolerating tube feeds; diarrhea improved  # Skin/Lines: sacral decub- wound care  #DVT ppx: SCDs; hold chemical dvt ppx with anemia:  # Dispo/Ethics:  Lengthy conversation held with  RCU BERNA styles, palliative NP Loni and patient's daughter Marysol and patient's  on 11/25. . It remains unclear what the ultimate goal is with patient. Discussed that she has been hospitalized for 2 months now, decompensating after every infectious illness and has not returned to her baseline. Will continue to speak with family to help elucidate further goals of care- plan for family discussion on Friday. Maintain current level of care at this time.

## 2024-11-26 NOTE — PROGRESS NOTE ADULT - ASSESSMENT
75 yo woman PMH T2DM on insulin, HTN, HLD, hypothyroidism, RA on Prednisone, Fibromyalgia, cerebral aneurysm s/p repair, chronic hypercapnic respiratory failure s/p trach (vent dependent) and chronic PEG 2022, recurrent C. diff infection , persistent GJ tube leaks now s/p GC fistula repair 8/12  admitted 10/7/24 with resp distress and found to have RML opacity     Antibiotics  Ceftriaxone 10/7  Azithro 10/7  Cefepime 10/7--> 10/12  meropenem 10/15 --> 10/23  IV Vanco 10/17 --> 10/21  Vanco via NGT 10/24; 11/1-->  Metronidazole 11/3 --> 11/7  Cefepime 11/5 --> 11/10; 11/11--> 11/15  Erythromycin 11/11-->  Ceftolozane, tazobactam 11/18 --> 11/25      10/10 melena, anemia, blood tx  10/14 EGD for GJ exchange and piror PEG site closure  One benign-appearing, intrinsic moderate stenosis was found in the upper third of the        esophagus. The stenosis was traversed.       The cardia and gastric fundus were normal.       A gastric tube with tip in the jejunum was found in the gastric body. The PEG required        removal because it was leaking. The J tube extension (12F) was removed first. An externally        removable 24 Fr EndoVive Safety gastrostomy tube was lubricated. The guide wire was passed        through the existing G-tube port and snared endoscopically. The endoscope and snare were then        removed, pulling the wire out through the mouth. The g-tube was tied to the guidewire, pulled        through the mouth into the stomach and then pulled out from the stomach through the skin. The        bumper was attached to the gastrostomy tube. The feeding tube was then cut to an appropriate        length. The final position of the gastrostomy tube was confirmed by relook endoscopy, and        skin marking noted to be 3 cm at the external bumper. The final tension and compression of        the abdominal wall by the PEG tube and external bumper were checked and revealed that the        bumper was moderately tight and mildly deforming the skin. The J tube extension (12 F        EndoVive Jejunal feeding tube) was advanced into the stomach. The tip of the J tube had a        loop thread that was captured with a Resolution clip. The thread and the J tube extension        were advanced to the proximal jejunum, and the endoclip along with the tip of the J tube was        attached to the wall securely. The J tube extension was capped, and the tube site was cleaned        and dressed.       A small fistula was found on the anterior wall of the gastric body related to prior G tube        site. Coagulation of tissue near the fistula using argon plasma at 1.2 liters/minute and 35        peraza was successful. To closure the gastrocutaneous fistula, four hemostatic clips were        successfully placed (MR conditional, two 16 mm DuraClip and 2 Mantis clips.       The examined duodenum was normal.    10/14, 10/15 Fever, transient hypoxia post procedure  10/15 ProCalcitonin = 8.48 suggestive of bacterial process; BCx x2 NGTD; Trach: Pseudomonas   - though benign mg stain  10;16 Leukocytosis WBC = 14.26  10/17 fever, hypotension, abd distension, no diarrhea noted this am WBC = 15.02; Rising Procalcitonin = 38.49; Obstructed J tube   10/18  lost IV access re-gained  - CT scan late this afternoon  WBC = 8.68; Rising Procalcitonin >100  10/18 imaging suggests multifocal pneumonia  10/21 improved chest exam, Procalcitonin level considerably decreased, decreased FiO2  - afebrile since 10/18  10/21 UGI endoscopy done  Findings:       The esophagus was normal. A fistula was found on the anterior wall of the gastric body.       There was evidence of a gastrostomy present in the gastric body. The patient was placed in        the supine position. The endoscope was advanced to the jejunum and the prior placed J tube        with loop attached to the wall and the loop thread was cut by endoclip. The J tube and the G        tube were removed. The gastrostomy fistula was utilized and an Avanos 22 F        Gastrostomy/Jejunal tube was advanced. The jejunal tip was advanced through the pylorus and        into the duodenum. The endoscope was then advanced to the duodenum and the loop thread at the        tip of the J tube was captured and advanced to the proximal jejunum. The loop thread was        clipped to wall by a [employer] Resolution clip. The J tube flushed easily and the G        tube decompress the abdomen easily. The internal balloon was insufflated with 10 cc of water        to hold the GJ tube in place. The outside marking was at 3.  10/23  no distress, liquid stools noted  - Meropenem discontinued  GI PCR NEG  10/24  persistent diarrhea  Cdiff NEG; enteral vanco stopped and Immodium provided  10/28 low grade temps, mild leukocytosis  10/30 blood transfusion  11/1  fever  +Cdiff  11/3 continued fever  - +BC Stph epi most likely procurement contaminant,  modest diarrhea reported  - daughter described increased diarrhea in evenings - Pseudomonas airway colonization is long term, no suggestion of pneumonia at present, sacral decub remains superifical will granulated without signs of infection  11/4  - fever, abnormal chest exam though FIO2 still 30%  rectal tube inserted for increased diarrhea  11/5 lethargy, fever, leukocytosis, increased lactate, hyponatremia  11/6 sputum gram stain suggests persistent Pseudomonas colonization  - continued fever, leukocytosis  11/7 unclear if LLL consolidation represents infection. Repeat Procalcitonin  11/7= 2.92 decreased  11/8 continued fevers  +diarrhea, lethargy continues  to complete Cefepime for LLL pneumonia v atelectasis tomorrow 11/9 11/11 increased fever with increased WBC and increased Procalcitonin off Cefepime noted, which was resumed  Concern if feed is refluxing back to the stomach versus J tube now in the stomach.     imaging show G tube in 1st part of duodenum. there are bibasilar L>R consolidations - likely continual reaspiration  11/12 persistent leukocytosis  11/13, decreased height of fever, decreased leukocytosis, Procalcitonin decreased to 52% of the 11/11 value suggesting good therapeutic response. s/p endoscopic replacement of feeding tube into jejunum  - Rectal tube came out  11/15 sleepy  - leukocytosis almost completely resolved      11/18 disturbing decompensation with clinical toxicity, unresponsive, increased leukocytosis, lactic acidosis   extensive antibiotic exposure with chronic Pseudomonas colonization of upper airway makes antibiotic resistance likely  Dental ASSESSMENT appreciated : Bedside Exam: No evidence of swelling, abscess or fistula. However, odontogenic infection cannot be fully ruled out without dental radiographs. No aspiration [of tooth] rk at this time.    11/19 increased leukocytosis, persistent lactic acidosis,   decreased ProCalcitonin  11/21  blood work improved  - I discussed with daughter who is considering comfort care at home after current antibiotic course is completed  11/22 reponsive now, clinically improved  11.25 improved, less toxic, with decreased leukocytosis and gratifying serial decline in ProCalcitonin value, though remain profoundly functionally impaired, generally somnolent and vulnerable  11/26 no immedate decompensation off antibiotics       Issues  recurrent Cdiff  cerebral aneurysm s/p repair  chronic hypercapnic respiratory failure s/p trach (vent dependent) and chronic PEG 2022  anemia  - had iron deficiency  Pseudomonas aeruginosa upper airway colonization  (most recent isolate Resist to Cipro/Levo)  T2DM on insulin  10.14.24 -A1C: 5.8%  HTN  HLD  hypothyroidism  RA on Prednisone  Fibromyalgia  debility  sacral ulcer largely healed  malnourished/chronic catabolic state  aspiration      Suggest  Completed Ceftolozane, tazobactam 11/18 through 11/25      discussed with RCU ACP  discussed with family about symptomatic approach

## 2024-11-26 NOTE — PROGRESS NOTE ADULT - SUBJECTIVE AND OBJECTIVE BOX
Follow Up:  resp failure    Interval History/ROS:  reported as being more responsive this am    Allergies  walnut (Unknown)  metronidazole (Rash)  Lyrica (Unknown)  penicillin (Unknown)  Pineapple (Unknown)  Tagamet (Unknown)  heparin (Unknown)  Pecans (Unknown)  Hazelnut (Unknown)  meropenem (Rash)        ANTIMICROBIALS:      OTHER MEDS:  MEDICATIONS  (STANDING):  acetaminophen   Oral Liquid .. 650 every 6 hours PRN  aluminum hydroxide/magnesium hydroxide/simethicone Suspension 30 every 4 hours PRN  escitalopram 10 <User Schedule>  gabapentin Solution 250 <User Schedule>  hydrALAZINE Injectable 10 every 8 hours  influenza  Vaccine (HIGH DOSE) 0.5 once  insulin glargine Injectable (LANTUS) 10 <User Schedule>  insulin lispro (ADMELOG) corrective regimen sliding scale  every 6 hours  levothyroxine 125 <User Schedule>  oxyCODONE    Solution 5 every 6 hours PRN  predniSONE   Tablet 5 daily      Vital Signs Last 24 Hrs  T(C): 36.2 (26 Nov 2024 11:36), Max: 36.3 (26 Nov 2024 05:50)  T(F): 97.2 (26 Nov 2024 11:36), Max: 97.3 (26 Nov 2024 05:50)  HR: 73 (26 Nov 2024 11:36) (63 - 81)  BP: 182/90 (26 Nov 2024 11:36) (156/56 - 182/90)  BP(mean): --  RR: 18 (26 Nov 2024 11:36) (18 - 20)  SpO2: 100% (26 Nov 2024 11:36) (99% - 100%)    Parameters below as of 26 Nov 2024 11:36  Patient On (Oxygen Delivery Method): ventilator        PHYSICAL EXAM:  General:  NAD, Non-toxic  Neurology: somnolent  Respiratory: Clear to auscultation bilaterally  CV: RRR, S1S2, no murmurs, rubs or gallops  Abdominal: Soft, Non-tender, non-distended, normal bowel sounds  Extremities: general  edema  Line Sites: Clear  Skin: No rash                          7.6    12.19 )-----------( 95       ( 26 Nov 2024 06:26 )             26.4       11-26    145  |  108  |  62[H]  ----------------------------<  230[H]  3.3[L]   |  17[L]  |  0.59    Ca    8.9      26 Nov 2024 06:27        Urinalysis Basic - ( 26 Nov 2024 06:27 )    Color: x / Appearance: x / SG: x / pH: x  Gluc: 230 mg/dL / Ketone: x  / Bili: x / Urobili: x   Blood: x / Protein: x / Nitrite: x   Leuk Esterase: x / RBC: x / WBC x   Sq Epi: x / Non Sq Epi: x / Bacteria: x        MICROBIOLOGY:  Catheterized Catheterized  11-18-24   >100,000 CFU/ml Enterococcus faecium (vancomycin resistant)  --  Enterococcus faecium (vancomycin resistant)      .Blood BLOOD  11-18-24   No growth at 5 days  --  --      .Sputum Sputum  11-18-24   Mixed gram negative rods including  Numerous Pseudomonas aeruginosa (Carbapenem Resistant) Multiple  Morphological Strains  Commensal vinay consistent with body site  --  Pseudomonas aeruginosa (Carbapenem Resistant)  Pseudomonas aeruginosa (Carbapenem Resistant)      .Blood BLOOD  11-18-24   No growth at 5 days  --  --      Trach Asp Tracheal Aspirate  11-10-24   Few Pseudomonas aeruginosa (Carbapenem Resistant)  Commensal vinay consistent with body site  --  Pseudomonas aeruginosa (Carbapenem Resistant)      .Blood BLOOD  11-10-24   No growth at 5 days  --  --      .Sputum Sputum  11-04-24   Moderate Mixed gram negative rods including  Moderate Pseudomonas aeruginosa  Commensal vinay consistent with body site  --  Pseudomonas aeruginosa      Clean Catch Clean Catch (Midstream)  11-03-24   No growth  --  --      .Blood BLOOD  11-03-24   No growth at 5 days  --  --      .Blood BLOOD  11-03-24   No growth at 5 days  --  --      .Blood BLOOD  11-01-24   Growth in anaerobic bottle: Staphylococcus epidermidis  Isolation of Coagulase negative Staphylococcus from single blood culture  sets may represent  contamination. Contact the Microbiology Department at [phone] if  susceptibility testing is needed.  clinically indicated.  Direct identification is available within approximately 3-5  hours either by Blood Panel Multiplexed PCR or Direct  MALDI-TOF. Details: [email or web address]  --  Blood Culture PCR      Catheterized Catheterized  11-01-24   No growth  --  --      .Blood BLOOD  11-01-24   No growth at 5 days  --  --      Trach Asp Tracheal Aspirate  10-27-24   Moderate Pseudomonas aeruginosa  Moderate Pseudomonas aeruginosa #2  Multiple Morphological Strains  Commensal vinay consistent with body site  --  Pseudomonas aeruginosa  Pseudomonas aeruginosa      .Blood BLOOD  10-27-24   No growth at 5 days  --  --          v          RADIOLOGY:    Zion Newman MD; Division of Infectious Disease; Pager: [phone]; nights and weekends: [phone]

## 2024-11-26 NOTE — CHART NOTE - NSCHARTNOTEFT_GEN_A_CORE
NUTRITION FOLLOW UP NOTE    PATIENT SEEN FOR: follow up     SOURCE: [] Patient  [x] Current Medical Record  [x] Nursing  [] Family/support person at bedside  [x] Patient unavailable/inappropriate  [] Other:     CHART REVIEWED/EVENTS NOTED.  [] No changes to nutrition care plan to note  [x] Nutrition Status:  -PMH T2DM  -s/p trach (vent dependent) and chronic PEG   -s/p G-J tube exchange by GI on 10/21. J tube for meds and feeds per team  -s/p Endoscopic Peg adjustment .   -Palliative care following, currently cont medical management    Diet, NPO with Tube Feed:   Tube Feeding Modality: Jejunostomy  Anjuke Peptide 1.5 ([id number])  Total Volume for 24 Hours (mL): 960  Continuous  Until Goal Tube Feed Rate (mL per Hour): 40  Tube Feed Duration (in Hours): 24  Tube Feed Start Time: 06:00  Free Water Flush  Pump   Rate (mL per Hour): 20   Frequency: Every Hour  Free Water Flush Instructions:  20 ml free water flushes Q1hr  Alfred(7 Gm Arginine/7 Gm Glut/1.2 Gm HMB     Qty per Day:  2  No Carb Prosource (1pkg = 15gms Protein)     Qty per Day:  1  Banatrol TF     Qty per Day:  1/2 packet per daily (24 @ 12:59) [Active]    CURRENT DIET ORDER IS:  [] Appropriate:  [] Inadequate:  [x] Other: see below for recommendations     NUTRITION INTAKE/PROVISION:  [] PO:  [x] Enteral Nutrition: Anjuke Peptide 1.5 40ml x 24 hours + Alfred BID + No carb Prosource daily + 1/2 packet Banatrol daily providing at 100% provision: 1768kcal (32.5kcal/kg), 92g protein (1.7g/kg) and 672ml free water.   -->Team changed tube feeds to 24 hours from 18 hours .   [] Parenteral Nutrition:    ANTHROPOMETRICS:  Drug Dosing Weight  Height (cm): 149.9 (21 Oct 2024 18:49)  Weight (kg): 54.4 (21 Oct 2024 18:49)  BMI (kg/m2): 24.2 (21 Oct 2024 18:49)  BSA (m2): 1.48 (21 Oct 2024 18:49)  10/16 62.5kg   Daily Weight in k.6 ()  Daily Weight in k.1 ()  RD will continue to monitor trends.     MEDICATIONS  (STANDING):  hydrALAZINE Injectable  insulin glargine Injectable (LANTUS)  insulin lispro (ADMELOG) corrective regimen sliding scale  levothyroxine  predniSONE   Tablet  sodium chloride    Pertinent Labs:  @ 06:27: Na 145, BUN 62[H], Cr 0.59, [H], K+ 3.3[L], Phos --, Mg --, Alk Phos --, ALT/SGPT --, AST/SGOT --, HbA1c --    A1C with Estimated Average Glucose Result: 5.8 % (10-14-24 @ 05:08)  A1C with Estimated Average Glucose Result: 6.4 % (24 @ 07:32)    Finger Sticks:  POCT Blood Glucose.: 247 mg/dL ( @ 05:06)  POCT Blood Glucose.: 187 mg/dL ( @ 23:34)  POCT Blood Glucose.: 129 mg/dL ( @ 17:18)  POCT Blood Glucose.: 119 mg/dL ( @ 11:51)    NUTRITIONALLY PERTINENT MEDICATIONS/LABS:  [x] Reviewed  [x] Relevant notes on medications/labs:   -synthroid ordered  -potassium low this morning   -insulin regimen ordered to maintain glycemic control    EDEMA:  [x] Reviewed  [x] Relevant notes:  per flow sheets:  3+ generalized   4+ ruth hand    GI/ I&O:  [x] Reviewed  [] Relevant notes:  [x] Other: c.diff positive, multiple lose stools daily. g tube to gravity drain    SKIN:   per flow sheets:  sacrum stage IV   left fifth toe suspected deep tissue injury   left and right hallux medial suspected deep tissue injury    Unable to perform Nutrition focused physical exam as edema masking potential muscle wasting, fat loss.     ESTIMATED NEEDS:  Based on: dosing weight 54.4kg   30-35kcal/kg 1632-1904kcal/day  1.4-1.8g/kg 76-98g protein/day  defer fluid needs to team     NUTRITION DIAGNOSIS:  [x] Prior Dx: increased nutrient needs  [] New Dx:    EDUCATION:  [] Yes:  [x] Not appropriate/warranted    NUTRITION CARE PLAN:  1. Diet: continue Anjuke Peptide 1.5 per team for a total volume of 960ml. Rate/hr deferred to team. Can continue banatrol to aid in stool bulking, prosource to aid in added protein/calories for wound healing and Alfred to aid in wound healing.   -->Insulin regimen deferred to team/endocrinology   -->Defer free water flush to team    2. Continue vitamin/mineral regimen as ordered per team     [] Achieved - Continue current nutrition intervention(s)  [] Current medical condition precludes nutrition intervention at this time.    MONITORING AND EVALUATION:   RD remains available upon request and will follow up per protocol.    Rufina Morris, MS, RD, CDN / Teams

## 2024-11-27 NOTE — PROGRESS NOTE ADULT - NSPROGADDITIONALINFOA_GEN_ALL_CORE
-Plan discussed with pt/team.  Contact info: [phone] (24/7). pager [phone]  Amion on [place]  Teams on M-T-W-F. Unavailable Thu/Weekends/Holidays  Assessed pt/labs/meds and discussed plan of care with primary team  Adjusting insulin  Discharge plan  Follow up care
Contact via [employer] Teams during business hours  To reach covering provider access AMION via sunrise tools  For Urgent matters/after-hours/weekends/holidays please page endocrine fellow on call   For nonurgent matters please email [email or web address]    Please note that this patient may be followed by different provider tomorrow.  Notify endocrine 24 hours prior to discharge for final recommendations
Contact via [employer] Teams during business hours  To reach covering provider access AMION via sunrise tools  For Urgent matters/after-hours/weekends/holidays please page endocrine fellow on call   For nonurgent matters please email [email or web address]    Please note that this patient may be followed by different provider tomorrow.  Notify endocrine 24 hours prior to discharge for final recommendations
Contact via [employer] Teams during business hours  To reach covering provider access AMION via sunrise tools  For Urgent matters/after-hours/weekends/holidays please page endocrine fellow on call   For nonurgent matters please email [email or web address]    Please note that this patient may be followed by different provider tomorrow.  Notify endocrine 24 hours prior to discharge for final recommendations
Contact via [employer] Teams during business hours  To reach covering provider access AMION via sunrise tools  For Urgent matters/after-hours/weekends/holidays please page endocrine fellow on call   For nonurgent matters please email [email or web address]    Please note that this patient may be followed by different provider tomorrow.  Notify endocrine 24 hours prior to discharge for final recommendations
-Plan discussed with pt's daughter /team.  Contact info: [phone] (24/7). pager [phone]  Angelika on [place]  Teams on M-T-W-F. Unavailable Thu/Weekends/Holidays  Assessed pt/labs/meds and discussed plan of care with primary team  Adjusting insulin  Discharge plan  Follow up care
Contact via [employer] Teams during business hours  To reach covering provider access AMION via sunrise tools  For Urgent matters/after-hours/weekends/holidays please page endocrine fellow on call   For nonurgent matters please email [email or web address]    Please note that this patient may be followed by different provider tomorrow.  Notify endocrine 24 hours prior to discharge for final recommendations
Contact via [employer] Teams during business hours  To reach covering provider access AMION via sunrise tools  For Urgent matters/after-hours/weekends/holidays please page endocrine fellow on call   For nonurgent matters please email [email or web address]    Please note that this patient may be followed by different provider tomorrow.  Notify endocrine 24 hours prior to discharge for final recommendations
-Plan discussed with pt/team.  Contact info: [phone] (24/7). pager [phone]  Amion on [place]  Teams on M-T-W-F. Unavailable Thu/Weekends/Holidays  Assessed pt/labs/meds and discussed plan of care with primary team  Adjusting insulin  Discharge plan  Follow up care
Contact via [employer] Teams during business hours  To reach covering provider access AMION via sunrise tools  For Urgent matters/after-hours/weekends/holidays please page endocrine fellow on call   For nonurgent matters please email [email or web address]    Please note that this patient may be followed by different provider tomorrow.  Notify endocrine 24 hours prior to discharge for final recommendations
-Plan discussed with pt's daughter /team.  Contact info: [phone] (24/7). pager [phone]  Angelika on [place]  Teams on M-T-W-F. Unavailable Thu/Weekends/Holidays  Assessed pt/labs/meds and discussed plan of care with primary team  Adjusting insulin  Discharge plan  Follow up care
Contact via Microsoft Teams during business hours  To reach covering provider access AMION via sunrise tools  For Urgent matters/after-hours/weekends/holidays please page endocrine fellow on call   For nonurgent matters please email [email or web address]    Please note that this patient may be followed by different provider tomorrow.  Notify endocrine 24 hours prior to discharge for final recommendations
Contact via Microsoft Teams during business hours  To reach covering provider access AMION via sunrise tools  For Urgent matters/after-hours/weekends/holidays please page endocrine fellow on call   For nonurgent matters please email [email or web address]    Please note that this patient may be followed by different provider tomorrow.  Notify endocrine 24 hours prior to discharge for final recommendations
Contact via Microsoft Teams during business hours  To reach covering provider access AMION via sunrise tools  For Urgent matters/after-hours/weekends/holidays please page endocrine fellow on call   For nonurgent matters please email [email or web address]    Please note that this patient may be followed by different provider tomorrow.  Notify endocrine 24 hours prior to discharge for final recommendations
Contact via Microsoft Teams during business hours  To reach covering provider access AMION via sunrise tools  For Urgent matters/after-hours/weekends/holidays please page endocrine fellow on call   For nonurgent matters please email [email or web address]    Please note that this patient may be followed by different provider tomorrow.  Notify endocrine 24 hours prior to discharge for final recommendations
Contact via Microsoft Teams during business hours  To reach covering provider access AMION via sunrise tools  For Urgent matters/after-hours/weekends/holidays please page endocrine fellow on call   For nonurgent matters please email [email or web address]    Please note that this patient may be followed by different provider tomorrow.  Notify endocrine 24 hours prior to discharge for final recommendations
-Plan discussed with pt's daughter /team.  Contact info: [phone] (24/7). pager [phone]  Angelika on [place]  Teams on M-T-W-F. Unavailable Thu/Weekends/Holidays  Assessed pt/labs/meds and discussed plan of care with primary team  Adjusting insulin  Discharge plan  Follow up care

## 2024-11-27 NOTE — PROGRESS NOTE ADULT - PROBLEM SELECTOR PLAN 7
- Continue prn Tylenol / oxy   - Continue Gabapentin   - Continue Fentanyl patches (Renew 11/26) - Continue prn Tylenol / oxy   - Continue Gabapentin   - Continue Fentanyl patches (Renew 12/3)

## 2024-11-27 NOTE — PROGRESS NOTE ADULT - NS ATTEND AMEND GEN_ALL_CORE FT
74F w/ PMH DM2 (insulin-dependent), HTN, HLD, hypothyroidism, RA on Prednisone, fibromyalgia, cerebral aneurysm s/p repair, chronic hypoxemia and hypercapnic respiratory failure s/p trach, vent-dependent, recurrent C diff infection, oropharyngeal dysphagia s/p G-J tube with persistent GJ tube leaks now s/p GC fistula repair 8/12, and recent presentation 5 days PTA for rectal bleeding requiring transfusion in the ED who now presents with acute hypoxemic respiratory distress 2/2 RML PNA, admitted to the RCU for further management. Found to have Pseudomonas aeruginosa in sputum culture and urine culture with ESBL E. coli s/p course of meropenem. Course complicated by antibiotic-associated diarrhea.     #Neuro: improved mentation today, answering yes/no questions. reports mouth pain on left side- dental evaluation recommending CT of head and neck   #CV: prior septic shock on midodrine; now improved; dc midodrine. PRN hydral for hypertension  #Pulm: remains on full support on the vent. start chest PT q 12 hours  #ID: completed treatment on 11/25 for  PsA PNA and recurrent  C diff with zerbaxa and oral vanco respectively  #Renal/Fluid: monitor I/O;   #Heme: anemia from hemorrhoids, AOCD. s/p EGD without active bleeding, transfuse for Hb < 7  #Endo: transitioned to home prednisone; insulin management per endo  #GI: tolerating tube feeds; diarrhea improved  # Skin/Lines: sacral decub- wound care  #DVT ppx: SCDs; hold chemical dvt ppx with anemia if H/H stable tomorrow can restart DVT PPX.  #Dispo/Ethics:  Lengthy conversation held with  RCU BERNA styles, palliative NP Loni and patient's daughter Marysol and patient's  on 11/25. . It remains unclear what the ultimate goal is with patient. Discussed that she has been hospitalized for 2 months now, decompensating after every infectious illness and has not returned to her baseline. Will continue to speak with family to help elucidate further goals of care- plan for family discussion on Friday. Maintain current level of care at this time. 74F w/ PMH DM2 (insulin-dependent), HTN, HLD, hypothyroidism, RA on Prednisone, fibromyalgia, cerebral aneurysm s/p repair, chronic hypoxemia and hypercapnic respiratory failure s/p trach, vent-dependent, recurrent C diff infection, oropharyngeal dysphagia s/p G-J tube with persistent GJ tube leaks now s/p GC fistula repair 8/12, and recent presentation 5 days PTA for rectal bleeding requiring transfusion in the ED who now presents with acute hypoxemic respiratory distress 2/2 RML PNA, admitted to the RCU for further management. Found to have Pseudomonas aeruginosa in sputum culture and urine culture with ESBL E. coli s/p course of meropenem. Course complicated by antibiotic-associated diarrhea.     #Neuro: improved mentation today, answering yes/no questions. reports mouth pain on left side- dental evaluation recommending CT of head and neck   #CV: prior septic shock on midodrine; now hypertensive; restart amlodipine and c/w hydralazine  #Pulm: remains on full support on the vent. start chest PT q 12 hours and duonebs q6 hours given wheezing on exam and thick secretions  #ID: completed treatment on 11/25 for  PsA PNA and recurrent  C diff with zerbaxa and oral vanco respectively  #Renal/Fluid: monitor I/O; will give 20mg of IV lasix today with goal of net negative 1 L; redose lasix if needed  #Heme: anemia from hemorrhoids, AOCD. s/p EGD without active bleeding, transfuse for Hb < 7  #Endo: transitioned to home prednisone; insulin management per endo  #GI: tolerating tube feeds; diarrhea improved  # Skin/Lines: sacral decub- wound care  #DVT ppx: SCDs; hold chemical dvt ppx with anemia if H/H stable tomorrow can restart DVT PPX.  #Dispo/Ethics:  Lengthy conversation held with  RCU BERNA styles, palliative NP Loni and patient's daughter Marysol and patient's  on 11/25.  It remains unclear what the ultimate goal is with patient. Discussed that she has been hospitalized for 2 months now, decompensating after every infectious illness and has not returned to her baseline. Further discussions including today remain unclear about further GOC, at this time continue with all interventions short of ICU or resuscitation. Plan for family discussion on Friday.

## 2024-11-27 NOTE — PROGRESS NOTE ADULT - SUBJECTIVE AND OBJECTIVE BOX
Patient is a 74y old  Female who presents with a chief complaint of Patient admitted with Hypoxemia and episode of Bright red blood per rectum (26 Nov 2024 14:18)      Interval Events:    REVIEW OF SYSTEMS:  [ ] Positive  [ ] All other systems negative  [ ] Unable to assess ROS because ________    Vital Signs Last 24 Hrs  T(C): 36.3 (11-27-24 @ 05:13), Max: 36.3 (11-26-24 @ 18:27)  T(F): 97.3 (11-27-24 @ 05:13), Max: 97.3 (11-26-24 @ 18:27)  HR: 56 (11-27-24 @ 08:25) (53 - 79)  BP: 178/70 (11-27-24 @ 04:11) (160/75 - 182/90)  RR: 16 (11-27-24 @ 04:11) (16 - 19)  SpO2: 100% (11-27-24 @ 08:25) (100% - 100%)    PHYSICAL EXAM:  HEENT:   [ ]Tracheostomy:  [ ]Pupils equal  [ ]No oral lesions  [ ]Abnormal    SKIN  [ ]No Rash  [ ] Abnormal  [ ] pressure    CARDIAC  [ ]Regular  [ ]Abnormal    PULMONARY  [ ]Bilateral Clear Breath Sounds  [ ]Normal Excursion  [ ]Abnormal    GI  [ ]PEG      [ ] +BS		              [ ]Soft, nondistended, nontender	  [ ]Abnormal    MUSCULOSKELETAL                                   [ ]Bedbound                 [ ]Abnormal    [ ]Ambulatory/OOB to chair                           EXTREMITIES                                         [ ]Normal  [ ]Edema                           NEUROLOGIC  [ ] Normal, non focal  [ ] Focal findings:    PSYCHIATRIC  [ ]Alert and appropriate  [ ] Sedated	 [ ]Agitated    :  Mcbride: [ ] Yes, if yes: Date of Placement:                   [  ] No    LINES: Central Lines [ ] Yes, if yes: Date of Placement                                     [  ] No    HOSPITAL MEDICATIONS:  MEDICATIONS  (STANDING):  artificial  tears Solution 1 Drop(s) Both EYES every 6 hours  Biotene Dry Mouth Oral Rinse 5 milliLiter(s) Swish and Spit every 6 hours  chlorhexidine 0.12% Liquid 15 milliLiter(s) Oral Mucosa every 12 hours  chlorhexidine 4% Liquid 1 Application(s) Topical daily  diclofenac sodium 1% Gel 2 Gram(s) Topical every 6 hours  escitalopram 10 milliGRAM(s) Oral <User Schedule>  FIRST- Mouthwash  BLM 5 milliLiter(s) Swish and Spit every 8 hours  gabapentin Solution 250 milliGRAM(s) Oral <User Schedule>  hemorrhoidal Ointment 1 Application(s) Rectal at bedtime  hydrALAZINE Injectable 10 milliGRAM(s) IV Push every 8 hours  influenza  Vaccine (HIGH DOSE) 0.5 milliLiter(s) IntraMuscular once  insulin glargine Injectable (LANTUS) 10 Unit(s) SubCutaneous <User Schedule>  insulin lispro (ADMELOG) corrective regimen sliding scale   SubCutaneous every 6 hours  lactobacillus acidophilus 1 Tablet(s) Oral <User Schedule>  levothyroxine 125 MICROGram(s) Oral <User Schedule>  lidocaine   4% Patch 4 Patch Transdermal every 24 hours  nystatin Powder 1 Application(s) Topical every 8 hours  predniSONE   Tablet 5 milliGRAM(s) Oral daily  sodium chloride 1 Gram(s) Oral every 8 hours  triamcinolone 0.1% Oral Paste 1 Application(s) Topical every 8 hours  witch hazel Pads 1 Application(s) Topical every 12 hours    MEDICATIONS  (PRN):  acetaminophen   Oral Liquid .. 650 milliGRAM(s) Oral every 6 hours PRN Temp greater or equal to 38C (100.4F), Mild Pain (1 - 3)  aluminum hydroxide/magnesium hydroxide/simethicone Suspension 30 milliLiter(s) Enteral Tube every 4 hours PRN Dyspepsia  oxyCODONE    Solution 5 milliGRAM(s) Oral every 6 hours PRN Moderate Pain (4 - 6)      LABS:                        7.6    12.19 )-----------( 95       ( 26 Nov 2024 06:26 )             26.4     11-26    145  |  108  |  62[H]  ----------------------------<  230[H]  3.3[L]   |  17[L]  |  0.59    Ca    8.9      26 Nov 2024 06:27        Urinalysis Basic - ( 26 Nov 2024 06:27 )    Color: x / Appearance: x / SG: x / pH: x  Gluc: 230 mg/dL / Ketone: x  / Bili: x / Urobili: x   Blood: x / Protein: x / Nitrite: x   Leuk Esterase: x / RBC: x / WBC x   Sq Epi: x / Non Sq Epi: x / Bacteria: x          CAPILLARY BLOOD GLUCOSE    MICROBIOLOGY:     RADIOLOGY:  [ ] Reviewed and interpreted by me    Mode: AC/ CMV (Assist Control/ Continuous Mandatory Ventilation)  RR (machine): 20  TV (machine): 400  FiO2: 40  PEEP: 5  ITime: 1  MAP: 16  PIP: 45   Patient is a 74y old  Female who presents with a chief complaint of Patient admitted with Hypoxemia and episode of Bright red blood per rectum (26 Nov 2024 14:18)      Interval Events: No events reported overnight    REVIEW OF SYSTEMS:  [ ] Positive  [ ] All other systems negative  [x] Unable to assess ROS because not answering verbal questioning     Vital Signs Last 24 Hrs  T(C): 36.3 (11-27-24 @ 05:13), Max: 36.3 (11-26-24 @ 18:27)  T(F): 97.3 (11-27-24 @ 05:13), Max: 97.3 (11-26-24 @ 18:27)  HR: 56 (11-27-24 @ 08:25) (53 - 79)  BP: 178/70 (11-27-24 @ 04:11) (160/75 - 182/90)  RR: 16 (11-27-24 @ 04:11) (16 - 19)  SpO2: 100% (11-27-24 @ 08:25) (100% - 100%)    PHYSICAL EXAM:  HEENT:   [x] Tracheostomy: #8 distal XLT cuffed Shiley   [x] PERRLA  [ ] No oral lesions  [ ] Abnormal    SKIN  [ ] No Rash  [ ] Abnormal  [x] Pressure + Sacral Wound    CARDIAC  [x] Regular  [ ] Abnormal    PULMONARY  [ ] Bilateral Clear Breath Sounds  [ ] Normal Excursion  [x] Abnormal: Diffuses coarse breath sounds / decreased at bases bilaterally     GI  [x] PEG site covered w/ dry dressing              [x] Soft, nondistended, nontender	  [x] Abnormal; old PEG stoma site with small opening c/d/i,     MUSCULOSKELETAL                                   [x] Bedbound                 [ ] Abnormal    [ ] Ambulatory/OOB to chair                           EXTREMITIES                                         [ ] Normal  [x] Edema - Anasarca                        NEUROLOGIC  [ ] Normal, non focal  [x] Focal findings: Obtunded, Minimally unresponsive to voice, grimacing occasionally, not following commands, no purposeful movements in all extremities, withdraws to noxious stimuli.    PSYCHIATRIC  [x] Unable to assess  [ ] Sedated	 [ ]Agitated    :  Mcbride: [ ] Yes, if yes: Date of Placement:                   [x] No    LINES: Central Lines [ ] Yes, if yes: Date of Placement                                     [x] No    HOSPITAL MEDICATIONS:  MEDICATIONS  (STANDING):  artificial  tears Solution 1 Drop(s) Both EYES every 6 hours  Biotene Dry Mouth Oral Rinse 5 milliLiter(s) Swish and Spit every 6 hours  chlorhexidine 0.12% Liquid 15 milliLiter(s) Oral Mucosa every 12 hours  chlorhexidine 4% Liquid 1 Application(s) Topical daily  diclofenac sodium 1% Gel 2 Gram(s) Topical every 6 hours  escitalopram 10 milliGRAM(s) Oral <User Schedule>  FIRST- Mouthwash  BLM 5 milliLiter(s) Swish and Spit every 8 hours  gabapentin Solution 250 milliGRAM(s) Oral <User Schedule>  hemorrhoidal Ointment 1 Application(s) Rectal at bedtime  hydrALAZINE Injectable 10 milliGRAM(s) IV Push every 8 hours  influenza  Vaccine (HIGH DOSE) 0.5 milliLiter(s) IntraMuscular once  insulin glargine Injectable (LANTUS) 10 Unit(s) SubCutaneous <User Schedule>  insulin lispro (ADMELOG) corrective regimen sliding scale   SubCutaneous every 6 hours  lactobacillus acidophilus 1 Tablet(s) Oral <User Schedule>  levothyroxine 125 MICROGram(s) Oral <User Schedule>  lidocaine   4% Patch 4 Patch Transdermal every 24 hours  nystatin Powder 1 Application(s) Topical every 8 hours  predniSONE   Tablet 5 milliGRAM(s) Oral daily  sodium chloride 1 Gram(s) Oral every 8 hours  triamcinolone 0.1% Oral Paste 1 Application(s) Topical every 8 hours  witch hazel Pads 1 Application(s) Topical every 12 hours    MEDICATIONS  (PRN):  acetaminophen   Oral Liquid .. 650 milliGRAM(s) Oral every 6 hours PRN Temp greater or equal to 38C (100.4F), Mild Pain (1 - 3)  aluminum hydroxide/magnesium hydroxide/simethicone Suspension 30 milliLiter(s) Enteral Tube every 4 hours PRN Dyspepsia  oxyCODONE    Solution 5 milliGRAM(s) Oral every 6 hours PRN Moderate Pain (4 - 6)      LABS:                        7.6    12.19 )-----------( 95       ( 26 Nov 2024 06:26 )             26.4     11-26    145  |  108  |  62[H]  ----------------------------<  230[H]  3.3[L]   |  17[L]  |  0.59    Ca    8.9      26 Nov 2024 06:27        Urinalysis Basic - ( 26 Nov 2024 06:27 )    Color: x / Appearance: x / SG: x / pH: x  Gluc: 230 mg/dL / Ketone: x  / Bili: x / Urobili: x   Blood: x / Protein: x / Nitrite: x   Leuk Esterase: x / RBC: x / WBC x   Sq Epi: x / Non Sq Epi: x / Bacteria: x          CAPILLARY BLOOD GLUCOSE    MICROBIOLOGY:     RADIOLOGY:  [ ] Reviewed and interpreted by me    Mode: AC/ CMV (Assist Control/ Continuous Mandatory Ventilation)  RR (machine): 20  TV (machine): 400  FiO2: 40  PEEP: 5  ITime: 1  MAP: 16  PIP: 45

## 2024-11-27 NOTE — PROGRESS NOTE ADULT - ASSESSMENT
73 yo F PMH T2DM on insulin, HTN, HLD, hypothyroidism, RA on Prednisone, Fibromyalgia, cerebral aneurysm s/p repair, chronic hypercapnic respiratory failure s/p trach (vent dependent) and chronic PEG 2022, recurrent C. diff infection (follows with Dr. Newman), persistent GJ tube leaks now s/p GC fistula repair 8/12 BIBEMS on 10/7 with daughter for respiratory distress, hypoxia to 88%.  Pt also noted to have rectal bleeding week prior to presentation requiring transfusion 5 days ago in ED as per daughter. Daughter also reports brownish discharge from G-J tube. In ED, pt afebrile, fiO2 96-98% on 40% on ventilator.  Chest X-ray w/ opacification of right lung concerning for possible PNA.  Admitted to RCU for further management. Sputum cxs grew PSA; treated with course of Cefepime. Patient with decreased H+H received 1 unit of PRBCS on 10/10.  10/14 EGD w/ Dr. Rosales for PEGJ exchange and clippings placed for closure of fistula site.  Persistent fevers treated with meropenem and vanc (d/c'd 10/20).  CT A/P without infectious source.  Continued fevers and persistent leukocytosis 11/1 CDiff positive - po vanco started, IV flagyl added (11/3-11/8). Head CT performed on 11/6 due to concern of lethargy no acute intracranial findings were noted; likely related to metabolic encephalopathy. Patient continued to have persistent fevers and was empirically treated with Cefepime (11/5-11/9) for CR PSA in sputum cx. CT C/A/P performed 11/6 c/w consolidative opacity LLL. Evening of 11/10-11/11, WBC 15 --> 21, procal 1 --> 43, lactate 3.2 -- pt also febrile. Infectious w/u sent 11/10 -- BCx negative, trach aspirate cx pending, UA negative. CT CAP on 11/11 showed left greater than right basilar consolidation and patchy ground glass opacity in both lungs consistent with atelectasis and/or pneumonia; Feeding tube coils in the stomach with tip within the first portion of the duodenum, No bowel obstruction. Cefepime restarted 11/11-11/15 with improvement in leukocytosis. Patient underwent Endoscopic adjustment of peg 11/13 with Dr. Rosales. XR tube check performed. Patient became Septic again on 11/19 requiring Midodrine, Solucortef and HC03 drip; Patient was empirically placed on Zerbaxa.     11/25: No events reported overnight. Patients AM Ademalog held in setting of borderline BGM. Endo follow up appreciated will hold q 6 hrs Ademalog and continue ISS. Case d/w ID [email or web address] and Enteral Vanco to complete tonight. Patients BP remains stable will dc Solu-cortef and place back patient on home regimen of prednisone 5 mg daily. Ongoing GOC discussions with daughter and  today; planned for Palliative / RCU meeting with children ( Daughters/ Son) and  on 11/29 @ Noon. In the meantime Daughter wants to continue same level of care.  75 yo F PMH T2DM on insulin, HTN, HLD, hypothyroidism, RA on Prednisone, Fibromyalgia, cerebral aneurysm s/p repair, chronic hypercapnic respiratory failure s/p trach (vent dependent) and chronic PEG 2022, recurrent C. diff infection (follows with Dr. Newman), persistent GJ tube leaks now s/p GC fistula repair 8/12 BIBEMS on 10/7 with daughter for respiratory distress, hypoxia to 88%.  Pt also noted to have rectal bleeding week prior to presentation requiring transfusion 5 days ago in ED as per daughter. Daughter also reports brownish discharge from G-J tube. In ED, pt afebrile, fiO2 96-98% on 40% on ventilator.  Chest X-ray w/ opacification of right lung concerning for possible PNA.  Admitted to RCU for further management. Sputum cxs grew PSA; treated with course of Cefepime. Patient with decreased H+H received 1 unit of PRBCS on 10/10.  10/14 EGD w/ Dr. Rosales for PEGJ exchange and clippings placed for closure of fistula site.  Persistent fevers treated with meropenem and vanc (d/c'd 10/20).  CT A/P without infectious source.  Continued fevers and persistent leukocytosis 11/1 CDiff positive - po vanco started, IV flagyl added (11/3-11/8). Head CT performed on 11/6 due to concern of lethargy no acute intracranial findings were noted; likely related to metabolic encephalopathy. Patient continued to have persistent fevers and was empirically treated with Cefepime (11/5-11/9) for CR PSA in sputum cx. CT C/A/P performed 11/6 c/w consolidative opacity LLL. Evening of 11/10-11/11, WBC 15 --> 21, procal 1 --> 43, lactate 3.2 -- pt also febrile. Infectious w/u sent 11/10 -- BCx negative, trach aspirate cx pending, UA negative. CT CAP on 11/11 showed left greater than right basilar consolidation and patchy ground glass opacity in both lungs consistent with atelectasis and/or pneumonia; Feeding tube coils in the stomach with tip within the first portion of the duodenum, No bowel obstruction. Cefepime restarted 11/11-11/15 with improvement in leukocytosis. Patient underwent Endoscopic adjustment of peg 11/13 with Dr. Rosales. XR tube check performed. Patient became Septic again on 11/19 requiring Midodrine, Solucortef and HC03 drip; Patient was empirically placed on Zerbaxa.     11/27: No events reported overnight. Patient is scheduled for Maxiofacial CT to evaluate facial swelling as per Dental Recommendations. Patient with elevated BP will resume Norvasc. Patient also remains with anasarca will give Lasix 20mg IVP X1.

## 2024-11-27 NOTE — PROGRESS NOTE ADULT - SUBJECTIVE AND OBJECTIVE BOX
Chief Complaint: Diabetes Mellitus follow up    INTERVAL HX:  Patient seen at bedside with  and aide present.  Her eyes are open and appears alert.   continues on tube feeding at 40cc/hr, x 24hrs. BG over the last 24 hrs have been slightly above and within goal range 100-200mg/dl. No hypoglycemia.     Review of Systems:  unable    Allergies    walnut (Unknown)  metronidazole (Rash)  Lyrica (Unknown)  penicillin (Unknown)  Pineapple (Unknown)  Tagamet (Unknown)  heparin (Unknown)  Pecans (Unknown)  Hazelnut (Unknown)  meropenem (Rash)    Intolerances      MEDICATIONS  (STANDING):  albuterol/ipratropium for Nebulization 3 milliLiter(s) Nebulizer every 6 hours  amLODIPine   Tablet 10 milliGRAM(s) Oral daily  artificial  tears Solution 1 Drop(s) Both EYES every 6 hours  Biotene Dry Mouth Oral Rinse 5 milliLiter(s) Swish and Spit every 6 hours  chlorhexidine 0.12% Liquid 15 milliLiter(s) Oral Mucosa every 12 hours  chlorhexidine 4% Liquid 1 Application(s) Topical daily  diclofenac sodium 1% Gel 2 Gram(s) Topical every 6 hours  escitalopram 10 milliGRAM(s) Oral <User Schedule>  fentaNYL   Patch  12 MICROgram(s)/Hr 1 Patch Transdermal every 72 hours  fentaNYL   Patch  25 MICROgram(s)/Hr 1 Patch Transdermal every 72 hours  FIRST- Mouthwash  BLM 5 milliLiter(s) Swish and Spit every 8 hours  gabapentin Solution 250 milliGRAM(s) Oral <User Schedule>  hemorrhoidal Ointment 1 Application(s) Rectal at bedtime  hydrALAZINE Injectable 10 milliGRAM(s) IV Push every 8 hours  influenza  Vaccine (HIGH DOSE) 0.5 milliLiter(s) IntraMuscular once  insulin glargine Injectable (LANTUS) 10 Unit(s) SubCutaneous <User Schedule>  insulin lispro (ADMELOG) corrective regimen sliding scale   SubCutaneous every 6 hours  lactobacillus acidophilus 1 Tablet(s) Oral <User Schedule>  levothyroxine 125 MICROGram(s) Oral <User Schedule>  lidocaine   4% Patch 4 Patch Transdermal every 24 hours  nystatin Powder 1 Application(s) Topical every 8 hours  predniSONE   Tablet 5 milliGRAM(s) Oral daily  sodium chloride 1 Gram(s) Oral every 8 hours  triamcinolone 0.1% Oral Paste 1 Application(s) Topical every 8 hours  witch hazel Pads 1 Application(s) Topical every 12 hours        insulin glargine Injectable (LANTUS)   10 Unit(s) SubCutaneous (11-27-24 @ 05:17)    insulin lispro (ADMELOG) corrective regimen sliding scale   1 Unit(s) SubCutaneous (11-27-24 @ 11:49)   2 Unit(s) SubCutaneous (11-27-24 @ 00:05)   2 Unit(s) SubCutaneous (11-26-24 @ 18:10)   3 Unit(s) SubCutaneous (11-26-24 @ 12:22)    levothyroxine   125 MICROGram(s) Oral (11-27-24 @ 04:17)    predniSONE   Tablet   5 milliGRAM(s) Oral (11-27-24 @ 05:16)        PHYSICAL EXAM:  VITALS: T(C): 36.3 (11-27-24 @ 11:33)  T(F): 97.3 (11-27-24 @ 11:33), Max: 97.3 (11-26-24 @ 18:27)  HR: 67 (11-27-24 @ 11:33) (53 - 71)  BP: 165/81 (11-27-24 @ 11:33) (160/75 - 178/70)  RR:  (16 - 20)  SpO2:  (100% - 100%)  Wt(kg): --  GENERAL: NAD  Respiratory: Respirations unlabored -vented and trached  Extremities: Warm, no edema  NEURO: Alert ,nonverbal     LABS:  POCT Blood Glucose.: 199 mg/dL (11-27-24 @ 11:20)  POCT Blood Glucose.: 149 mg/dL (11-27-24 @ 05:05)  POCT Blood Glucose.: 208 mg/dL (11-26-24 @ 23:36)  POCT Blood Glucose.: 238 mg/dL (11-26-24 @ 17:56)  POCT Blood Glucose.: 292 mg/dL (11-26-24 @ 11:34)  POCT Blood Glucose.: 247 mg/dL (11-26-24 @ 05:06)  POCT Blood Glucose.: 187 mg/dL (11-25-24 @ 23:34)  POCT Blood Glucose.: 129 mg/dL (11-25-24 @ 17:18)  POCT Blood Glucose.: 119 mg/dL (11-25-24 @ 11:51)  POCT Blood Glucose.: 92 mg/dL (11-25-24 @ 05:52)  POCT Blood Glucose.: 191 mg/dL (11-24-24 @ 23:34)  POCT Blood Glucose.: 205 mg/dL (11-24-24 @ 17:18)                          7.6    12.19 )-----------( 95       ( 26 Nov 2024 06:26 )             26.4     11-26    145  |  108  |  62[H]  ----------------------------<  230[H]  3.3[L]   |  17[L]  |  0.59    Ca    8.9      26 Nov 2024 06:27          Thyroid Function Tests:      A1C with Estimated Average Glucose Result: 5.8 % (10-14-24 @ 05:08)    Estimated Average Glucose: 120 mg/dL (10-14-24 @ 05:08)        Diet, NPO with Tube Feed:   Tube Feeding Modality: Jejunostomy  Eye-Q Peptide 1.5 ([id number])  Total Volume for 24 Hours (mL): 960  Continuous  Until Goal Tube Feed Rate (mL per Hour): 40  Tube Feed Duration (in Hours): 24  Tube Feed Start Time: 06:00  Free Water Flush  Pump   Rate (mL per Hour): 20   Frequency: Every Hour  Free Water Flush Instructions:  20 ml free water flushes Q1hr  Alfred(7 Gm Arginine/7 Gm Glut/1.2 Gm HMB     Qty per Day:  2  No Carb Prosource (1pkg = 15gms Protein)     Qty per Day:  1  Banatrol TF     Qty per Day:  1/2 packet per daily (11-24-24 @ 12:59) [Active]

## 2024-11-27 NOTE — PROGRESS NOTE ADULT - ASSESSMENT
75 yo F w/h/o controlled T2DM (A1C 5.8%) on insulin at home. Also HTN, HLD, hypothyroidism, RA on Prednisone, Fibromyalgia, cerebral aneurysm s/p repair, chronic hypercapnic respiratory failure s/p trach (vent dependent) and chronic PEG 2022, recurrent C. diff infection (follows with Dr. 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 S/p antibiotics and s/p GJ tube exchanges on 10/14, s/p PEG replacement 10/21.   Endocrine consulted for Type 2 DM management while on tube feeding. Patient tolerating 24hr TFs at goal rate with BG mostly at goal or slightly above goal  while on present insulin doses. On contact isolation for  C diff. No hypoglycemia. Patient is now receiving palliative care but  family would like to keep care the same until 11/29 when family meeting will take place with the team. will continue checking BG and giving insulin per family request.    BG goal 100-200mg/dL inpatient.       Home DM medications: Lantus 35 units at HS, Humalog 26 units with # 1 feeding, 22 units with #2 tube feeding, 20 units with #3 tube feeding and  Humalog correction scale (  2 units for -992, 4 units for -250, 6 units for -300, 8units for -350, 10 units for -400, 12 units for -450)   while on [employer] formula 3 cans/day, slow bolus( run at 80 ml/hr total volume 960 ml/day> 1st can around 6:30-8:30, 2nd can around 3 pm, 3rd can around 8-9 pm) plus Banatrol 1/2 packet BID, was increasing to 1 packet BID, plus Kefir 10 oz/day ( divided in multiple times throughout the day).

## 2024-11-27 NOTE — PROGRESS NOTE ADULT - PROBLEM SELECTOR PLAN 12
- Patients Daughter updated at bedside by RCU Team today  - MOLST Updated 11/19: Remains DNR / + Intubate, No Pressors / No transfer to ICU Setting   - Ongoing GOC discussions; Daughter continues to discuss options with family; currently cont medical management  - Palliative care continues to follow  - Family meeting to be held on 11/29 @ Noon - Patients Daughter updated at bedside by RCU Team today  - MOLST Updated 11/19: Remains DNR / + Intubate, No Pressors / No transfer to ICU Setting   - Ongoing GOC discussions; Daughter continues to discuss options with family; currently cont medical management  - Family meeting to be held on 11/29 @ Noon with Children and  with Palliative Care

## 2024-11-27 NOTE — PROGRESS NOTE ADULT - SUBJECTIVE AND OBJECTIVE BOX
Follow Up:   resp failure    Interval History/ROS:  briefly repsonsive, fatigued    Allergies  walnut (Unknown)  metronidazole (Rash)  Lyrica (Unknown)  penicillin (Unknown)  Pineapple (Unknown)  Tagamet (Unknown)  heparin (Unknown)  Pecans (Unknown)  Hazelnut (Unknown)  meropenem (Rash)        ANTIMICROBIALS:      OTHER MEDS:  MEDICATIONS  (STANDING):  acetaminophen   Oral Liquid .. 650 every 6 hours PRN  albuterol/ipratropium for Nebulization 3 every 6 hours  aluminum hydroxide/magnesium hydroxide/simethicone Suspension 30 every 4 hours PRN  amLODIPine   Tablet 10 daily  escitalopram 10 <User Schedule>  fentaNYL   Patch  12 MICROgram(s)/Hr 1 every 72 hours  fentaNYL   Patch  25 MICROgram(s)/Hr 1 every 72 hours  gabapentin Solution 250 <User Schedule>  hydrALAZINE Injectable 10 every 8 hours  influenza  Vaccine (HIGH DOSE) 0.5 once  insulin glargine Injectable (LANTUS) 10 <User Schedule>  insulin lispro (ADMELOG) corrective regimen sliding scale  every 6 hours  levothyroxine 125 <User Schedule>  oxyCODONE    Solution 5 every 6 hours PRN  predniSONE   Tablet 5 daily      Vital Signs Last 24 Hrs  T(C): 36.2 (27 Nov 2024 17:19), Max: 36.3 (27 Nov 2024 05:13)  T(F): 97.2 (27 Nov 2024 17:19), Max: 97.3 (27 Nov 2024 05:13)  HR: 63 (27 Nov 2024 17:27) (53 - 71)  BP: 150/72 (27 Nov 2024 17:19) (150/72 - 178/70)  BP(mean): --  RR: 20 (27 Nov 2024 17:19) (16 - 20)  SpO2: 100% (27 Nov 2024 17:27) (99% - 100%)    Parameters below as of 27 Nov 2024 17:27  Patient On (Oxygen Delivery Method): ventilator        PHYSICAL EXAM:  General:  NAD, Non-toxic  HENT  swelling left face  Neurology: very lethargic  Respiratory: Clear to auscultation bilaterally  CV: RRR, S1S2, no murmurs, rubs or gallops  Abdominal: Soft, Non-tender, non-distended, normal bowel sounds  Extremities: generalized edema,   Line Sites: Clear  Skin: No rash                        7.6    12.19 )-----------( 95       ( 26 Nov 2024 06:26 )             26.4       11-26    145  |  108  |  62[H]  ----------------------------<  230[H]  3.3[L]   |  17[L]  |  0.59    Ca    8.9      26 Nov 2024 06:27        Urinalysis Basic - ( 26 Nov 2024 06:27 )    Color: x / Appearance: x / SG: x / pH: x  Gluc: 230 mg/dL / Ketone: x  / Bili: x / Urobili: x   Blood: x / Protein: x / Nitrite: x   Leuk Esterase: x / RBC: x / WBC x   Sq Epi: x / Non Sq Epi: x / Bacteria: x        MICROBIOLOGY:  Catheterized Catheterized  11-18-24   >100,000 CFU/ml Enterococcus faecium (vancomycin resistant)  --  Enterococcus faecium (vancomycin resistant)      .Blood BLOOD  11-18-24   No growth at 5 days  --  --      .Sputum Sputum  11-18-24   Mixed gram negative rods including  Numerous Pseudomonas aeruginosa (Carbapenem Resistant) Multiple  Morphological Strains  Commensal vinay consistent with body site  --  Pseudomonas aeruginosa (Carbapenem Resistant)  Pseudomonas aeruginosa (Carbapenem Resistant)      .Blood BLOOD  11-18-24   No growth at 5 days  --  --      Trach Asp Tracheal Aspirate  11-10-24   Few Pseudomonas aeruginosa (Carbapenem Resistant)  Commensal vinay consistent with body site  --  Pseudomonas aeruginosa (Carbapenem Resistant)      .Blood BLOOD  11-10-24   No growth at 5 days  --  --      .Sputum Sputum  11-04-24   Moderate Mixed gram negative rods including  Moderate Pseudomonas aeruginosa  Commensal vinay consistent with body site  --  Pseudomonas aeruginosa      Clean Catch Clean Catch (Midstream)  11-03-24   No growth  --  --      .Blood BLOOD  11-03-24   No growth at 5 days  --  --      .Blood BLOOD  11-03-24   No growth at 5 days  --  --      .Blood BLOOD  11-01-24   Growth in anaerobic bottle: Staphylococcus epidermidis  Isolation of Coagulase negative Staphylococcus from single blood culture  sets may represent  contamination. Contact the Microbiology Department at [phone] if  susceptibility testing is needed.  clinically indicated.  Direct identification is available within approximately 3-5  hours either by Blood Panel Multiplexed PCR or Direct  MALDI-TOF. Details: [email or web address]  --  Blood Culture PCR      Catheterized Catheterized  11-01-24   No growth  --  --      .Blood BLOOD  11-01-24   No growth at 5 days  --  --    RADIOLOGY:      Zion Newman MD; Division of Infectious Disease; Pager: [phone]; nights and weekends: [phone]

## 2024-11-27 NOTE — PROGRESS NOTE ADULT - NUTRITIONAL ASSESSMENT
Diet, NPO with Tube Feed:   Tube Feeding Modality: Jejunostomy  [employer] Peptide 1.5 (KFPEPT1.5RTH)  Total Volume for 24 Hours (mL): 960  Continuous  Until Goal Tube Feed Rate (mL per Hour): 40  Tube Feed Duration (in Hours): 24  Tube Feed Start Time: 06:00  Free Water Flush  Pump   Rate (mL per Hour): 20   Frequency: Every Hour  Free Water Flush Instructions:  20 ml free water flushes Q1hr  Alfred(7 Gm Arginine/7 Gm Glut/1.2 Gm HMB     Qty per Day:  2  No Carb Prosource (1pkg = 15gms Protein)     Qty per Day:  1  Banatrol TF     Qty per Day:  1/2 packet per daily (11-24-24 @ 12:59) [Active]      Please see RD assessment and/or follow up.  Managed by primary team as well

## 2024-11-27 NOTE — PROGRESS NOTE ADULT - ASSESSMENT
73 yo woman PMH T2DM on insulin, HTN, HLD, hypothyroidism, RA on Prednisone, Fibromyalgia, cerebral aneurysm s/p repair, chronic hypercapnic respiratory failure s/p trach (vent dependent) and chronic PEG 2022, recurrent C. diff infection , persistent GJ tube leaks now s/p GC fistula repair 8/12  admitted 10/7/24 with resp distress and found to have RML opacity     Antibiotics  Ceftriaxone 10/7  Azithro 10/7  Cefepime 10/7--> 10/12  meropenem 10/15 --> 10/23  IV Vanco 10/17 --> 10/21  Vanco via NGT 10/24; 11/1-->  Metronidazole 11/3 --> 11/7  Cefepime 11/5 --> 11/10; 11/11--> 11/15  Erythromycin 11/11-->  Ceftolozane, tazobactam 11/18 --> 11/25      10/10 melena, anemia, blood tx  10/14 EGD for GJ exchange and piror PEG site closure  One benign-appearing, intrinsic moderate stenosis was found in the upper third of the        esophagus. The stenosis was traversed.       The cardia and gastric fundus were normal.       A gastric tube with tip in the jejunum was found in the gastric body. The PEG required        removal because it was leaking. The J tube extension (12F) was removed first. An externally        removable 24 Fr EndoVive Safety gastrostomy tube was lubricated. The guide wire was passed        through the existing G-tube port and snared endoscopically. The endoscope and snare were then        removed, pulling the wire out through the mouth. The g-tube was tied to the guidewire, pulled        through the mouth into the stomach and then pulled out from the stomach through the skin. The        bumper was attached to the gastrostomy tube. The feeding tube was then cut to an appropriate        length. The final position of the gastrostomy tube was confirmed by relook endoscopy, and        skin marking noted to be 3 cm at the external bumper. The final tension and compression of        the abdominal wall by the PEG tube and external bumper were checked and revealed that the        bumper was moderately tight and mildly deforming the skin. The J tube extension (12 F        EndoVive Jejunal feeding tube) was advanced into the stomach. The tip of the J tube had a        loop thread that was captured with a Resolution clip. The thread and the J tube extension        were advanced to the proximal jejunum, and the endoclip along with the tip of the J tube was        attached to the wall securely. The J tube extension was capped, and the tube site was cleaned        and dressed.       A small fistula was found on the anterior wall of the gastric body related to prior G tube        site. Coagulation of tissue near the fistula using argon plasma at 1.2 liters/minute and 35        peraza was successful. To closure the gastrocutaneous fistula, four hemostatic clips were        successfully placed (MR conditional, two 16 mm DuraClip and 2 Mantis clips.       The examined duodenum was normal.    10/14, 10/15 Fever, transient hypoxia post procedure  10/15 ProCalcitonin = 8.48 suggestive of bacterial process; BCx x2 NGTD; Trach: Pseudomonas   - though benign mg stain  10;16 Leukocytosis WBC = 14.26  10/17 fever, hypotension, abd distension, no diarrhea noted this am WBC = 15.02; Rising Procalcitonin = 38.49; Obstructed J tube   10/18  lost IV access re-gained  - CT scan late this afternoon  WBC = 8.68; Rising Procalcitonin >100  10/18 imaging suggests multifocal pneumonia  10/21 improved chest exam, Procalcitonin level considerably decreased, decreased FiO2  - afebrile since 10/18  10/21 UGI endoscopy done  Findings:       The esophagus was normal. A fistula was found on the anterior wall of the gastric body.       There was evidence of a gastrostomy present in the gastric body. The patient was placed in        the supine position. The endoscope was advanced to the jejunum and the prior placed J tube        with loop attached to the wall and the loop thread was cut by endoclip. The J tube and the G        tube were removed. The gastrostomy fistula was utilized and an Avanos 22 F        Gastrostomy/Jejunal tube was advanced. The jejunal tip was advanced through the pylorus and        into the duodenum. The endoscope was then advanced to the duodenum and the loop thread at the        tip of the J tube was captured and advanced to the proximal jejunum. The loop thread was        clipped to wall by a [employer] Resolution clip. The J tube flushed easily and the G        tube decompress the abdomen easily. The internal balloon was insufflated with 10 cc of water        to hold the GJ tube in place. The outside marking was at 3.  10/23  no distress, liquid stools noted  - Meropenem discontinued  GI PCR NEG  10/24  persistent diarrhea  Cdiff NEG; enteral vanco stopped and Immodium provided  10/28 low grade temps, mild leukocytosis  10/30 blood transfusion  11/1  fever  +Cdiff  11/3 continued fever  - +BC Stph epi most likely procurement contaminant,  modest diarrhea reported  - daughter described increased diarrhea in evenings - Pseudomonas airway colonization is long term, no suggestion of pneumonia at present, sacral decub remains superifical will granulated without signs of infection  11/4  - fever, abnormal chest exam though FIO2 still 30%  rectal tube inserted for increased diarrhea  11/5 lethargy, fever, leukocytosis, increased lactate, hyponatremia  11/6 sputum gram stain suggests persistent Pseudomonas colonization  - continued fever, leukocytosis  11/7 unclear if LLL consolidation represents infection. Repeat Procalcitonin  11/7= 2.92 decreased  11/8 continued fevers  +diarrhea, lethargy continues  to complete Cefepime for LLL pneumonia v atelectasis tomorrow 11/9 11/11 increased fever with increased WBC and increased Procalcitonin off Cefepime noted, which was resumed  Concern if feed is refluxing back to the stomach versus J tube now in the stomach.     imaging show G tube in 1st part of duodenum. there are bibasilar L>R consolidations - likely continual reaspiration  11/12 persistent leukocytosis  11/13, decreased height of fever, decreased leukocytosis, Procalcitonin decreased to 52% of the 11/11 value suggesting good therapeutic response. s/p endoscopic replacement of feeding tube into jejunum  - Rectal tube came out  11/15 sleepy  - leukocytosis almost completely resolved      11/18 disturbing decompensation with clinical toxicity, unresponsive, increased leukocytosis, lactic acidosis   extensive antibiotic exposure with chronic Pseudomonas colonization of upper airway makes antibiotic resistance likely  Dental ASSESSMENT appreciated : Bedside Exam: No evidence of swelling, abscess or fistula. However, odontogenic infection cannot be fully ruled out without dental radiographs. No aspiration [of tooth] rk at this time.    11/19 increased leukocytosis, persistent lactic acidosis,   decreased ProCalcitonin  11/21  blood work improved  - I discussed with daughter who is considering comfort care at home after current antibiotic course is completed  11/22 reponsive now, clinically improved  11.25 improved, less toxic, with decreased leukocytosis and gratifying serial decline in ProCalcitonin value, though remain profoundly functionally impaired, generally somnolent and vulnerable  11/26 no immedate decompensation off antibiotics       Issues  recurrent Cdiff  cerebral aneurysm s/p repair  chronic hypercapnic respiratory failure s/p trach (vent dependent) and chronic PEG 2022  anemia  - had iron deficiency  Pseudomonas aeruginosa upper airway colonization  (most recent isolate Resist to Cipro/Levo)  T2DM on insulin  10.14.24 -A1C: 5.8%  HTN  HLD  hypothyroidism  RA on Prednisone  Fibromyalgia  debility  sacral ulcer largely healed  malnourished/chronic catabolic state  aspiration      Suggest  maintain off antibiotics      discussed with RCU ACP  - CT scan to evaluate sinus/odontogenic focus  discussed with daughter who remains painfully ambivalent about goals of care  - family meeting on 11/29    I will be out until 12/9  -- ID service to follow

## 2024-11-27 NOTE — PROGRESS NOTE ADULT - PROBLEM SELECTOR PLAN 1
- Patient now receiving palliative care services. Family meeting will be held on 11/29/24   Please monitor blood glucose values q 6 hours while on tube feeding or NPO  - Continue Lantus to 10u QAM  - Continue off of admelog 7units Q6hrs for now.  - continue Admelog correction scale q 6 hours to low dose while on 24 hour tube feeding   - Please inform Endocrine team for any changes in tube feeding,  steroid  - Please keep all IV abx in NS solution if possible!  Discharge Plan:  - TBD depending on final decisions of family and on  insulin requirement if discharged on tube feeding regimen (Bolus vs continuous tube feeding)   - If bolus tube feeding > Lantus plus Humalog   - Patient should have BGs checked 4x a day while on TFs.    Daughter aware to contact Endocrinologist if BG <70mg/dL x1 or >200mg/dL consistently or >400mg/dL x1.   - Followup with Dr Ruth: Endocrinology [facility]: [address] [place] [address]. [address]. [place] [address]. Tel: [phone]- Telemedicine- daughter to schedule.   - Make sure pt has Rx for all DM supplies and insulin

## 2024-11-27 NOTE — PROGRESS NOTE ADULT - PROBLEM SELECTOR PLAN 4
- Amolodipine and hydralazine previously dcd in setting of hypotension   - Hydralazine IVP Resumed 11/22  - Continue to monitor BP - Amolodipine and hydralazine previously dcd in setting of hypotension   - Hydralazine IVP Resumed 11/22  - Norvasc resumed 11/27   - Continue to monitor BP

## 2024-11-27 NOTE — PROGRESS NOTE ADULT - PROBLEM SELECTOR PLAN 2
[] Sepsis  - Sputum cx 11/18: CRE PSA, Urine cx 11/18:E.facelis ( Likely colonized/ UA Unimpressive ), Bld cx 11/18: NGTD   - Hypotension resolved Midodrine dcd, Solu-cortef Dcd  - Zerbaxa and enteral Vanco to complete today       [] PNA   - Sputum cx 10/8: PSA  - CXR 10/7: Opacification of the right middle lobe may represent pneumonia versus atelectasis  - CT A/P 10/18: ? Multifocal pneumonia  - Completed Initial course of cefepime on 10/12  - Sputum 11/4: PSA; Repeat CT C/A/P 11/6: Consolidative opacity LLL  - Cefepime restarted 11/5-->11/15; in setting of recurrent fevers  - CT CAP on 11/11 showed left greater than right basilar consolidation and patchy groundglass opacity in both lungs consistent with atelectasis and/or pneumonia.     [] C.Diff   - Stool C.diff: + 11/1   - S/p Flagyl 11/3-11/8  - Oral Vanco 11/1-11/25 [] Sepsis  - Sputum cx 11/18: CRE PSA, Urine cx 11/18:E.facelis ( Likely colonized/ UA Unimpressive ), Bld cx 11/18: NGTD   - Hypotension resolved Midodrine dcd, Solu-cortef Dcd  - Treated with Zerbaxa ( 11/18-11/25)       [] PNA   - Sputum cx 10/8: PSA  - CXR 10/7: Opacification of the right middle lobe may represent pneumonia versus atelectasis  - CT A/P 10/18: ? Multifocal pneumonia  - Completed Initial course of cefepime on 10/12  - Sputum 11/4: PSA; Repeat CT C/A/P 11/6: Consolidative opacity LLL  - Cefepime restarted 11/5-->11/15; in setting of recurrent fevers  - CT CAP on 11/11 showed left greater than right basilar consolidation and patchy groundglass opacity in both lungs consistent with atelectasis and/or pneumonia.     [] C.Diff   - Stool C.diff: + 11/1   - S/p Flagyl 11/3-11/8  - Oral Vanco 11/1-11/25

## 2024-11-28 NOTE — PROGRESS NOTE ADULT - PROBLEM SELECTOR PLAN 2
[] Sepsis  - Sputum cx 11/18: CRE PSA, Urine cx 11/18:E.facelis ( Likely colonized/ UA Unimpressive ), Bld cx 11/18: NGTD   - Hypotension resolved Midodrine dcd, Solu-cortef Dcd  - Treated with Zerbaxa ( 11/18-11/25)       [] PNA   - Sputum cx 10/8: PSA  - CXR 10/7: Opacification of the right middle lobe may represent pneumonia versus atelectasis  - CT A/P 10/18: ? Multifocal pneumonia  - Completed Initial course of cefepime on 10/12  - Sputum 11/4: PSA; Repeat CT C/A/P 11/6: Consolidative opacity LLL  - Cefepime restarted 11/5-->11/15; in setting of recurrent fevers  - CT CAP on 11/11 showed left greater than right basilar consolidation and patchy groundglass opacity in both lungs consistent with atelectasis and/or pneumonia.     [] C.Diff   - Stool C.diff: + 11/1   - S/p Flagyl 11/3-11/8  - Oral Vanco 11/1-11/25 [] Sepsis  - Sputum cx 11/18: CRE PSA, Urine cx 11/18:E.facelis ( Likely colonized/ UA Unimpressive ), Bld cx 11/18: NGTD   - Hypotension resolved Midodrine dcd, Solu-cortef Dcd  - Treated with Zerbaxa ( 11/18-11/25)     [] PNA   - Sputum cx 10/8: PSA  - CXR 10/7: Opacification of the right middle lobe may represent pneumonia versus atelectasis  - CT A/P 10/18: ? Multifocal pneumonia  - Completed Initial course of cefepime on 10/12  - Sputum 11/4: PSA; Repeat CT C/A/P 11/6: Consolidative opacity LLL  - Cefepime restarted 11/5-->11/15; in setting of recurrent fevers  - CT CAP on 11/11 showed left greater than right basilar consolidation and patchy groundglass opacity in both lungs consistent with atelectasis and/or pneumonia.     [] C.Diff   - Stool C.diff: + 11/1   - S/p Flagyl 11/3-11/8  - Oral Vanco 11/1-11/25

## 2024-11-28 NOTE — PROGRESS NOTE ADULT - PROBLEM SELECTOR PLAN 12
- Patients Daughter updated at bedside by RCU Team today  - [facility]OLST Updated 11/19: Remains DNR / + Intubate, No Pressors / No transfer to ICU Setting   - Ongoing GOC discussions; Daughter continues to discuss options with family; currently cont medical management  - Family meeting to be held on 11/29 @ Noon with Children and  with Palliative Care

## 2024-11-28 NOTE — PROGRESS NOTE ADULT - SUBJECTIVE AND OBJECTIVE BOX
Patient is a 74y old  Female who presents with a chief complaint of Patient admitted with Hypoxemia and episode of Bright red blood per rectum (27 Nov 2024 19:38)      Interval Events:    REVIEW OF SYSTEMS:  [ ] Positive  [ ] All other systems negative  [ ] Unable to assess ROS because ________    Vital Signs Last 24 Hrs  T(C): 36.6 (11-28-24 @ 05:00), Max: 36.6 (11-28-24 @ 05:00)  T(F): 97.9 (11-28-24 @ 05:00), Max: 97.9 (11-28-24 @ 05:00)  HR: 75 (11-28-24 @ 05:54) (56 - 75)  BP: 153/61 (11-28-24 @ 05:54) (137/69 - 181/78)  RR: 20 (11-28-24 @ 05:54) (18 - 20)  SpO2: 100% (11-28-24 @ 05:54) (99% - 100%)    PHYSICAL EXAM:  HEENT:   [ ]Tracheostomy:  [ ]Pupils equal  [ ]No oral lesions  [ ]Abnormal    SKIN  [ ]No Rash  [ ] Abnormal  [ ] pressure    CARDIAC  [ ]Regular  [ ]Abnormal    PULMONARY  [ ]Bilateral Clear Breath Sounds  [ ]Normal Excursion  [ ]Abnormal    GI  [ ]PEG      [ ] +BS		              [ ]Soft, nondistended, nontender	  [ ]Abnormal    MUSCULOSKELETAL                                   [ ]Bedbound                 [ ]Abnormal    [ ]Ambulatory/OOB to chair                           EXTREMITIES                                         [ ]Normal  [ ]Edema                           NEUROLOGIC  [ ] Normal, non focal  [ ] Focal findings:    PSYCHIATRIC  [ ]Alert and appropriate  [ ] Sedated	 [ ]Agitated    :  Mcbride: [ ] Yes, if yes: Date of Placement:                   [  ] No    LINES: Central Lines [ ] Yes, if yes: Date of Placement                                     [  ] No    HOSPITAL MEDICATIONS:  MEDICATIONS  (STANDING):  albuterol/ipratropium for Nebulization 3 milliLiter(s) Nebulizer every 6 hours  amLODIPine   Tablet 10 milliGRAM(s) Oral daily  artificial  tears Solution 1 Drop(s) Both EYES every 6 hours  Biotene Dry Mouth Oral Rinse 5 milliLiter(s) Swish and Spit every 6 hours  chlorhexidine 0.12% Liquid 15 milliLiter(s) Oral Mucosa every 12 hours  chlorhexidine 4% Liquid 1 Application(s) Topical daily  diclofenac sodium 1% Gel 2 Gram(s) Topical every 6 hours  escitalopram 10 milliGRAM(s) Oral <User Schedule>  fentaNYL   Patch  12 MICROgram(s)/Hr 1 Patch Transdermal every 72 hours  fentaNYL   Patch  25 MICROgram(s)/Hr 1 Patch Transdermal every 72 hours  FIRST- Mouthwash  BLM 5 milliLiter(s) Swish and Spit every 8 hours  gabapentin Solution 250 milliGRAM(s) Oral <User Schedule>  hemorrhoidal Ointment 1 Application(s) Rectal at bedtime  hydrALAZINE Injectable 10 milliGRAM(s) IV Push every 8 hours  influenza  Vaccine (HIGH DOSE) 0.5 milliLiter(s) IntraMuscular once  insulin glargine Injectable (LANTUS) 10 Unit(s) SubCutaneous <User Schedule>  insulin lispro (ADMELOG) corrective regimen sliding scale   SubCutaneous every 6 hours  lactobacillus acidophilus 1 Tablet(s) Oral <User Schedule>  levothyroxine 125 MICROGram(s) Oral <User Schedule>  lidocaine   4% Patch 4 Patch Transdermal every 24 hours  nystatin Powder 1 Application(s) Topical every 8 hours  predniSONE   Tablet 5 milliGRAM(s) Oral daily  sodium chloride 1 Gram(s) Oral every 8 hours  triamcinolone 0.1% Oral Paste 1 Application(s) Topical every 8 hours  witch hazel Pads 1 Application(s) Topical every 12 hours    MEDICATIONS  (PRN):  acetaminophen   Oral Liquid .. 650 milliGRAM(s) Oral every 6 hours PRN Temp greater or equal to 38C (100.4F), Mild Pain (1 - 3)  aluminum hydroxide/magnesium hydroxide/simethicone Suspension 30 milliLiter(s) Enteral Tube every 4 hours PRN Dyspepsia  oxyCODONE    Solution 5 milliGRAM(s) Oral every 6 hours PRN Moderate Pain (4 - 6)      LABS:                  CAPILLARY BLOOD GLUCOSE    MICROBIOLOGY:     RADIOLOGY:  [ ] Reviewed and interpreted by me    Mode: AC/ CMV (Assist Control/ Continuous Mandatory Ventilation)  RR (machine): 20  TV (machine): 400  FiO2: 40  PEEP: 5  ITime: 1  MAP: 16  PIP: 46   Patient is a 74y old  Female who presents with a chief complaint of Patient admitted with Hypoxemia and episode of Bright red blood per rectum (27 Nov 2024 19:38)      Interval Events: no events reported over night.    REVIEW OF SYSTEMS:  [ ] Positive  [ ] All other systems negative  [X] Unable to assess ROS because __lethargic but arousable, nods yes when asked if in pain    Vital Signs Last 24 Hrs  T(C): 36.6 (11-28-24 @ 05:00), Max: 36.6 (11-28-24 @ 05:00)  T(F): 97.9 (11-28-24 @ 05:00), Max: 97.9 (11-28-24 @ 05:00)  HR: 75 (11-28-24 @ 05:54) (56 - 75)  BP: 153/61 (11-28-24 @ 05:54) (137/69 - 181/78)  RR: 20 (11-28-24 @ 05:54) (18 - 20)  SpO2: 100% (11-28-24 @ 05:54) (99% - 100%)        PHYSICAL EXAM:  HEENT:   [X] Tracheostomy: #8 XLT cuffed Shiley   [X] PERRL B/L;   [ ] No oral lesions  [ ] Abnormal    SKIN  [ ] No Rash  [ ] Abnormal  [X] pressure - sacral unstageable    CARDIAC  [X] Regular  [ ] Abnormal    PULMONARY  [ ] Bilateral Clear Breath Sounds  [ ] Normal Excursion  [X] Abnormal - mild coarse BS     GI  [X] PEG      [X] +BS		              [X] Soft, nondistended, nontender	  [X] Abnormal - old PEG site c/d/i, + rectal tube     MUSCULOSKELETAL                                   [X] Bedbound                 [ ] Abnormal    [ ] Ambulatory/OOB to chair                           EXTREMITIES                                         [ ] Normal  [X] Edema - mild generalized pitting edema                          NEUROLOGIC  [ ] Normal, non focal  [X] Focal findings: lethargic but following commands    PSYCHIATRIC  [x] Easily arousable, lethargic  [ ] Sedated	 [ ]Agitated    :  Mcbride: [ ] Yes, if yes: Date of Placement:                   [X] No    LINES: Central Lines [ ] Yes, if yes: Date of Placement                                     [X] No      HOSPITAL MEDICATIONS:  MEDICATIONS  (STANDING):  albuterol/ipratropium for Nebulization 3 milliLiter(s) Nebulizer every 6 hours  amLODIPine   Tablet 10 milliGRAM(s) Oral daily  artificial  tears Solution 1 Drop(s) Both EYES every 6 hours  Biotene Dry Mouth Oral Rinse 5 milliLiter(s) Swish and Spit every 6 hours  chlorhexidine 0.12% Liquid 15 milliLiter(s) Oral Mucosa every 12 hours  chlorhexidine 4% Liquid 1 Application(s) Topical daily  diclofenac sodium 1% Gel 2 Gram(s) Topical every 6 hours  escitalopram 10 milliGRAM(s) Oral <User Schedule>  fentaNYL   Patch  12 MICROgram(s)/Hr 1 Patch Transdermal every 72 hours  fentaNYL   Patch  25 MICROgram(s)/Hr 1 Patch Transdermal every 72 hours  FIRST- Mouthwash  BLM 5 milliLiter(s) Swish and Spit every 8 hours  gabapentin Solution 250 milliGRAM(s) Oral <User Schedule>  hemorrhoidal Ointment 1 Application(s) Rectal at bedtime  hydrALAZINE Injectable 10 milliGRAM(s) IV Push every 8 hours  influenza  Vaccine (HIGH DOSE) 0.5 milliLiter(s) IntraMuscular once  insulin glargine Injectable (LANTUS) 10 Unit(s) SubCutaneous <User Schedule>  insulin lispro (ADMELOG) corrective regimen sliding scale   SubCutaneous every 6 hours  lactobacillus acidophilus 1 Tablet(s) Oral <User Schedule>  levothyroxine 125 MICROGram(s) Oral <User Schedule>  lidocaine   4% Patch 4 Patch Transdermal every 24 hours  nystatin Powder 1 Application(s) Topical every 8 hours  predniSONE   Tablet 5 milliGRAM(s) Oral daily  sodium chloride 1 Gram(s) Oral every 8 hours  triamcinolone 0.1% Oral Paste 1 Application(s) Topical every 8 hours  witch hazel Pads 1 Application(s) Topical every 12 hours    MEDICATIONS  (PRN):  acetaminophen   Oral Liquid .. 650 milliGRAM(s) Oral every 6 hours PRN Temp greater or equal to 38C (100.4F), Mild Pain (1 - 3)  aluminum hydroxide/magnesium hydroxide/simethicone Suspension 30 milliLiter(s) Enteral Tube every 4 hours PRN Dyspepsia  oxyCODONE    Solution 5 milliGRAM(s) Oral every 6 hours PRN Moderate Pain (4 - 6)      LABS:    11-28    146[H]  |  112[H]  |  65[H]  ----------------------------<  161[H]  4.1   |  17[L]  |  0.64    Ca    9.1      28 Nov 2024 07:28  Phos  4.2     11-28  Mg     1.7     11-28                    CAPILLARY BLOOD GLUCOSE    MICROBIOLOGY:     RADIOLOGY:  [ ] Reviewed and interpreted by me    Mode: AC/ CMV (Assist Control/ Continuous Mandatory Ventilation)  RR (machine): 20  TV (machine): 400  FiO2: 40  PEEP: 5  ITime: 1  MAP: 16  PIP: 46

## 2024-11-28 NOTE — PROGRESS NOTE ADULT - PROBLEM SELECTOR PLAN 4
- Amolodipine and hydralazine previously dcd in setting of hypotension   - Hydralazine IVP Resumed 11/22  - Norvasc resumed 11/27   - Continue to monitor BP

## 2024-11-28 NOTE — PROGRESS NOTE ADULT - ASSESSMENT
75 yo F PMH T2DM on insulin, HTN, HLD, hypothyroidism, RA on Prednisone, Fibromyalgia, cerebral aneurysm s/p repair, chronic hypercapnic respiratory failure s/p trach (vent dependent) and chronic PEG 2022, recurrent C. diff infection (follows with Dr. Newman), persistent GJ tube leaks now s/p GC fistula repair 8/12 BIBEMS on 10/7 with daughter for respiratory distress, hypoxia to 88%.  Pt also noted to have rectal bleeding week prior to presentation requiring transfusion 5 days ago in ED as per daughter. Daughter also reports brownish discharge from G-J tube. In ED, pt afebrile, fiO2 96-98% on 40% on ventilator.  Chest X-ray w/ opacification of right lung concerning for possible PNA.  Admitted to RCU for further management. Sputum cxs grew PSA; treated with course of Cefepime. Patient with decreased H+H received 1 unit of PRBCS on 10/10.  10/14 EGD w/ Dr. Rosales for PEGJ exchange and clippings placed for closure of fistula site.  Persistent fevers treated with meropenem and vanc (d/c'd 10/20).  CT A/P without infectious source.  Continued fevers and persistent leukocytosis 11/1 CDiff positive - po vanco started, IV flagyl added (11/3-11/8). Head CT performed on 11/6 due to concern of lethargy no acute intracranial findings were noted; likely related to metabolic encephalopathy. Patient continued to have persistent fevers and was empirically treated with Cefepime (11/5-11/9) for CR PSA in sputum cx. CT C/A/P performed 11/6 c/w consolidative opacity LLL. Evening of 11/10-11/11, WBC 15 --> 21, procal 1 --> 43, lactate 3.2 -- pt also febrile. Infectious w/u sent 11/10 -- BCx negative, trach aspirate cx pending, UA negative. CT CAP on 11/11 showed left greater than right basilar consolidation and patchy ground glass opacity in both lungs consistent with atelectasis and/or pneumonia; Feeding tube coils in the stomach with tip within the first portion of the duodenum, No bowel obstruction. Cefepime restarted 11/11-11/15 with improvement in leukocytosis. Patient underwent Endoscopic adjustment of peg 11/13 with Dr. Rosales. XR tube check performed. Patient became Septic again on 11/19 requiring Midodrine, Solucortef and HC03 drip; Patient was empirically placed on Zerbaxa.     11/27: No events reported overnight. Patient is scheduled for Maxiofacial CT to evaluate facial swelling as per Dental Recommendations. Patient with elevated BP will resume Norvasc. Patient also remains with anasarca will give Lasix 20mg IVP X1.   75 yo F PMH T2DM on insulin, HTN, HLD, hypothyroidism, RA on Prednisone, Fibromyalgia, cerebral aneurysm s/p repair, chronic hypercapnic respiratory failure s/p trach (vent dependent) and chronic PEG 2022, recurrent C. diff infection (follows with Dr. Newman), persistent GJ tube leaks now s/p GC fistula repair 8/12 BIBEMS on 10/7 with daughter for respiratory distress, hypoxia to 88%.  Pt also noted to have rectal bleeding week prior to presentation requiring transfusion 5 days ago in ED as per daughter. Daughter also reports brownish discharge from G-J tube. In ED, pt afebrile, fiO2 96-98% on 40% on ventilator.  Chest X-ray w/ opacification of right lung concerning for possible PNA.  Admitted to RCU for further management. Sputum cxs grew PSA; treated with course of Cefepime. Patient with decreased H+H received 1 unit of PRBCS on 10/10.  10/14 EGD w/ Dr. Rosales for PEGJ exchange and clippings placed for closure of fistula site.  Persistent fevers treated with meropenem and vanc (d/c'd 10/20).  CT A/P without infectious source.  Continued fevers and persistent leukocytosis 11/1 CDiff positive - po vanco started, IV flagyl added (11/3-11/8). Head CT performed on 11/6 due to concern of lethargy no acute intracranial findings were noted; likely related to metabolic encephalopathy. Patient continued to have persistent fevers and was empirically treated with Cefepime (11/5-11/9) for CR PSA in sputum cx. CT C/A/P performed 11/6 c/w consolidative opacity LLL. Evening of 11/10-11/11, WBC 15 --> 21, procal 1 --> 43, lactate 3.2 -- pt also febrile. Infectious w/u sent 11/10 -- BCx negative, trach aspirate cx pending, UA negative. CT CAP on 11/11 showed left greater than right basilar consolidation and patchy ground glass opacity in both lungs consistent with atelectasis and/or pneumonia; Feeding tube coils in the stomach with tip within the first portion of the duodenum, No bowel obstruction. Cefepime restarted 11/11-11/15 with improvement in leukocytosis. Patient underwent Endoscopic adjustment of peg 11/13 with Dr. Rosales. XR tube check performed. Patient became Septic again on 11/19 requiring Midodrine, Solucortef and HC03 drip; Patient was empirically placed on Zerbaxa.     11/27: No events reported overnight. Patient is scheduled for Maxiofacial CT to evaluate facial swelling as per Dental Recommendations. Patient with elevated BP will resume Norvasc. Patient also remains with anasarca will give Lasix 20mg IVP X1.   11/28:  No acute events.  CT done last night, no findings that correlate with swollen face.  Sinus opacification increased compared to prior imaging.  Daughter updated over phone.  Pending family meeting on 11/29 to further discucc goals of care given patient's current state.

## 2024-11-28 NOTE — PROGRESS NOTE ADULT - NS ATTEND AMEND GEN_ALL_CORE FT
74F w/ PMH DM2 (insulin-dependent), HTN, HLD, hypothyroidism, RA on Prednisone, fibromyalgia, cerebral aneurysm s/p repair, chronic hypoxemia and hypercapnic respiratory failure s/p trach, vent-dependent, recurrent C diff infection, oropharyngeal dysphagia s/p G-J tube with persistent GJ tube leaks now s/p GC fistula repair 8/12, and recent presentation 5 days PTA for rectal bleeding requiring transfusion in the ED who now presents with acute hypoxemic respiratory distress 2/2 RML PNA, admitted to the RCU for further management. Found to have Pseudomonas aeruginosa in sputum culture and urine culture with ESBL E. coli s/p course of meropenem. Course complicated by antibiotic-associated diarrhea.     #Neuro: Intermittent awakening, answering yes/no questions. reports mouth pain on left side- dental evaluation recommending CT of head and neck   #CV: prior septic shock on midodrine; now hypertensive; restarted amlodipine and c/w hydralazine with improvement  #Pulm: remains on full support on the vent. c/w chest PT q 12 hours and duonebs q6 hours. Wheezing improved.   #ID: completed treatment on 11/25 for  PsA PNA and recurrent C diff with zerbaxa and oral vanco. Will CTM off Abx therapy for now.  #Renal/Fluid: monitor I/O; Diuresis prn for euvolemia  #Heme: anemia from hemorrhoids, AOCD. s/p EGD without active bleeding, transfuse for Hb < 7  #Endo: transitioned to home prednisone; insulin management per endo  #GI: tolerating tube feeds; diarrhea improved  # Skin/Lines: sacral decub- wound care  #DVT ppx: SCDs; hold chemical dvt ppx with anemia if H/H stable tomorrow can restart DVT PPX.  #Dispo/Ethics:  Lengthy conversation held with  RCU BERNA styles, palliative NP Loni and patient's daughter Marysol and patient's  on 11/25.  It remains unclear what the ultimate goal is with patient. Discussed that she has been hospitalized for 2 months now, decompensating after every infectious illness and has not returned to her baseline. Further discussions including today remain unclear about further GOC, at this time continue with all interventions short of ICU or resuscitation. Plan for family discussion on Friday.

## 2024-11-29 NOTE — PROGRESS NOTE ADULT - ASSESSMENT
75 yo F PMH T2DM on insulin, HTN, HLD, hypothyroidism, RA on Prednisone, Fibromyalgia, cerebral aneurysm s/p repair, chronic hypercapnic respiratory failure s/p trach (vent dependent) and chronic PEG 2022, recurrent C. diff infection (follows with Dr. Newman), persistent GJ tube leaks now s/p GC fistula repair 8/12 BIBEMS on 10/7 with daughter for respiratory distress, hypoxia to 88%.  Pt also noted to have rectal bleeding week prior to presentation requiring transfusion 5 days ago in ED as per daughter. Daughter also reports brownish discharge from G-J tube. In ED, pt afebrile, fiO2 96-98% on 40% on ventilator.  Chest X-ray w/ opacification of right lung concerning for possible PNA.  Admitted to RCU for further management. Sputum cxs grew PSA; treated with course of Cefepime. Patient with decreased H+H received 1 unit of PRBCS on 10/10.  10/14 EGD w/ Dr. Rosales for PEGJ exchange and clippings placed for closure of fistula site.  Persistent fevers treated with meropenem and vanc (d/c'd 10/20).  CT A/P without infectious source.  Continued fevers and persistent leukocytosis 11/1 CDiff positive - po vanco started, IV flagyl added (11/3-11/8). Head CT performed on 11/6 due to concern of lethargy no acute intracranial findings were noted; likely related to metabolic encephalopathy. Patient continued to have persistent fevers and was empirically treated with Cefepime (11/5-11/9) for CR PSA in sputum cx. CT C/A/P performed 11/6 c/w consolidative opacity LLL. Evening of 11/10-11/11, WBC 15 --> 21, procal 1 --> 43, lactate 3.2 -- pt also febrile. Infectious w/u sent 11/10 -- BCx negative, trach aspirate cx pending, UA negative. CT CAP on 11/11 showed left greater than right basilar consolidation and patchy ground glass opacity in both lungs consistent with atelectasis and/or pneumonia; Feeding tube coils in the stomach with tip within the first portion of the duodenum, No bowel obstruction. Cefepime restarted 11/11-11/15 with improvement in leukocytosis. Patient underwent Endoscopic adjustment of peg 11/13 with Dr. Rosales. XR tube check performed. Patient became Septic again on 11/19 requiring Midodrine, Solucortef and HC03 drip; Patient was empirically placed on Zerbaxa.     11/27: No events reported overnight. Patient is scheduled for Maxiofacial CT to evaluate facial swelling as per Dental Recommendations. Patient with elevated BP will resume Norvasc. Patient also remains with anasarca will give Lasix 20mg IVP X1.   11/28:  No acute events.  CT done last night, no findings that correlate with swollen face.  Sinus opacification increased compared to prior imaging.  Daughter updated over phone.  Pending family meeting on 11/29 to further discuss goals of care given patient's current state.   11/29: No acute events.  Awaiting family decision post goals of care meeting to determine nature of hospitalization moving forward.

## 2024-11-29 NOTE — PROGRESS NOTE ADULT - ASSESSMENT
75 yo F w/h/o controlled T2DM (A1C 5.8%) on insulin at home. Also HTN, HLD, hypothyroidism, RA on Prednisone, Fibromyalgia, cerebral aneurysm s/p repair, chronic hypercapnic respiratory failure s/p trach (vent dependent) and chronic PEG 2022, recurrent C. diff infection (follows with Dr. 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 S/p antibiotics and s/p GJ tube exchanges on 10/14, s/p PEG replacement 10/21. Endocrine consulted for Type 2 DM management while on tube feeding.   Patient tolerating 24hr TFs at goal rate with BG at goal. Ongoing GOC discussion, pt is discussing next step of care: continue medical management vs comfort care/capping care continue ventilator vs compassionate withdrawal of care.  BG goal 100-200mg/dL inpatient.       Home DM medications: Lantus 35 units at HS, Humalog 26 units with # 1 feeding, 22 units with #2 tube feeding, 20 units with #3 tube feeding and  Humalog correction scale (  2 units for -341, 4 units for -250, 6 units for -300, 8units for -350, 10 units for -400, 12 units for -450)   while on [employer] formula 3 cans/day, slow bolus( run at 80 ml/hr total volume 960 ml/day> 1st can around 6:30-8:30, 2nd can around 3 pm, 3rd can around 8-9 pm) plus Banatrol 1/2 packet BID, was increasing to 1 packet BID, plus Kefir 10 oz/day ( divided in multiple times throughout the day).

## 2024-11-29 NOTE — PROGRESS NOTE ADULT - NUTRITIONAL ASSESSMENT
Diet, NPO with Tube Feed:   Tube Feeding Modality: Jejunostomy  Casie Songkick Peptide 1.5 ([id number])  Total Volume for 24 Hours (mL): 960  Continuous  Until Goal Tube Feed Rate (mL per Hour): 40  Tube Feed Duration (in Hours): 24  Tube Feed Start Time: 06:00  Free Water Flush  Pump   Rate (mL per Hour): 40   Frequency: Every Hour  Free Water Flush Instructions:  40 ml free water flushes Q1hr  Alfred(7 Gm Arginine/7 Gm Glut/1.2 Gm HMB     Qty per Day:  2  No Carb Prosource (1pkg = 15gms Protein)     Qty per Day:  1  Banatrol TF     Qty per Day:  1/2 packet per daily (11-29-24 @ 09:18) [Active]

## 2024-11-29 NOTE — PROGRESS NOTE ADULT - ASSESSMENT
75 yo F w/ PMH DM2 (insulin-dependent), HTN, HLD, hypothyroidism, RA on Prednisone, fibromyalgia, cerebral aneurysm s/p repair, chronic hypoxemia and hypercapnic respiratory failure s/p trach, vent-dependent, recurrent C diff infection, oropharyngeal dysphagia s/p G-J tube with persistent GJ tube leaks now s/p GC fistula repair 8/12, and recent presentation 5 days PTA for rectal bleeding requiring transfusion in the ED who now presents with acute hypoxemic respiratory distress 2/2 RML PNA, admitted to the RCU for further management. Found to have Pseudomonas aeruginosa in sputum culture and urine culture with ESBL E. coli s/p course of meropenem. Course complicated by antibiotic-associated diarrhea.

## 2024-11-29 NOTE — PROGRESS NOTE ADULT - ASSESSMENT
75 yo woman PMH T2DM on insulin, HTN, HLD, hypothyroidism, RA on Prednisone, Fibromyalgia, cerebral aneurysm s/p repair, chronic hypercapnic respiratory failure s/p trach (vent dependent) and chronic PEG 2022, recurrent C. diff infection , persistent GJ tube leaks now s/p GC fistula repair 8/12  admitted 10/7/24 with resp distress and found to have RML opacity     Antibiotics  Ceftriaxone 10/7  Azithro 10/7  Cefepime 10/7--> 10/12  meropenem 10/15 --> 10/23  IV Vanco 10/17 --> 10/21  Vanco via NGT 10/24; 11/1-->  Metronidazole 11/3 --> 11/7  Cefepime 11/5 --> 11/10; 11/11--> 11/15  Erythromycin 11/11-->  Ceftolozane, tazobactam 11/18 --> 11/25      10/10 melena, anemia, blood tx  10/14 EGD for GJ exchange and piror PEG site closure  One benign-appearing, intrinsic moderate stenosis was found in the upper third of the        esophagus. The stenosis was traversed.       The cardia and gastric fundus were normal.       A gastric tube with tip in the jejunum was found in the gastric body. The PEG required        removal because it was leaking. The J tube extension (12F) was removed first. An externally        removable 24 Fr EndoVive Safety gastrostomy tube was lubricated. The guide wire was passed        through the existing G-tube port and snared endoscopically. The endoscope and snare were then        removed, pulling the wire out through the mouth. The g-tube was tied to the guidewire, pulled        through the mouth into the stomach and then pulled out from the stomach through the skin. The        bumper was attached to the gastrostomy tube. The feeding tube was then cut to an appropriate        length. The final position of the gastrostomy tube was confirmed by relook endoscopy, and        skin marking noted to be 3 cm at the external bumper. The final tension and compression of        the abdominal wall by the PEG tube and external bumper were checked and revealed that the        bumper was moderately tight and mildly deforming the skin. The J tube extension (12 F        EndoVive Jejunal feeding tube) was advanced into the stomach. The tip of the J tube had a        loop thread that was captured with a Resolution clip. The thread and the J tube extension        were advanced to the proximal jejunum, and the endoclip along with the tip of the J tube was        attached to the wall securely. The J tube extension was capped, and the tube site was cleaned        and dressed.       A small fistula was found on the anterior wall of the gastric body related to prior G tube        site. Coagulation of tissue near the fistula using argon plasma at 1.2 liters/minute and 35        peraza was successful. To closure the gastrocutaneous fistula, four hemostatic clips were        successfully placed (MR conditional, two 16 mm DuraClip and 2 Mantis clips.       The examined duodenum was normal.    10/14, 10/15 Fever, transient hypoxia post procedure  10/15 ProCalcitonin = 8.48 suggestive of bacterial process; BCx x2 NGTD; Trach: Pseudomonas   - though benign mg stain  10;16 Leukocytosis WBC = 14.26  10/17 fever, hypotension, abd distension, no diarrhea noted this am WBC = 15.02; Rising Procalcitonin = 38.49; Obstructed J tube   10/18  lost IV access re-gained  - CT scan late this afternoon  WBC = 8.68; Rising Procalcitonin >100  10/18 imaging suggests multifocal pneumonia  10/21 improved chest exam, Procalcitonin level considerably decreased, decreased FiO2  - afebrile since 10/18  10/21 UGI endoscopy done  Findings:       The esophagus was normal. A fistula was found on the anterior wall of the gastric body.       There was evidence of a gastrostomy present in the gastric body. The patient was placed in        the supine position. The endoscope was advanced to the jejunum and the prior placed J tube        with loop attached to the wall and the loop thread was cut by endoclip. The J tube and the G        tube were removed. The gastrostomy fistula was utilized and an Avanos 22 F        Gastrostomy/Jejunal tube was advanced. The jejunal tip was advanced through the pylorus and        into the duodenum. The endoscope was then advanced to the duodenum and the loop thread at the        tip of the J tube was captured and advanced to the proximal jejunum. The loop thread was        clipped to wall by a [employer] Resolution clip. The J tube flushed easily and the G        tube decompress the abdomen easily. The internal balloon was insufflated with 10 cc of water        to hold the GJ tube in place. The outside marking was at 3.  10/23  no distress, liquid stools noted  - Meropenem discontinued  GI PCR NEG  10/24  persistent diarrhea  Cdiff NEG; enteral vanco stopped and Immodium provided  10/28 low grade temps, mild leukocytosis  10/30 blood transfusion  11/1  fever  +Cdiff  11/3 continued fever  - +BC Stph epi most likely procurement contaminant,  modest diarrhea reported  - daughter described increased diarrhea in evenings - Pseudomonas airway colonization is long term, no suggestion of pneumonia at present, sacral decub remains superifical will granulated without signs of infection  11/4  - fever, abnormal chest exam though FIO2 still 30%  rectal tube inserted for increased diarrhea  11/5 lethargy, fever, leukocytosis, increased lactate, hyponatremia  11/6 sputum gram stain suggests persistent Pseudomonas colonization  - continued fever, leukocytosis  11/7 unclear if LLL consolidation represents infection. Repeat Procalcitonin  11/7= 2.92 decreased  11/8 continued fevers  +diarrhea, lethargy continues  to complete Cefepime for LLL pneumonia v atelectasis tomorrow 11/9 11/11 increased fever with increased WBC and increased Procalcitonin off Cefepime noted, which was resumed  Concern if feed is refluxing back to the stomach versus J tube now in the stomach.     imaging show G tube in 1st part of duodenum. there are bibasilar L>R consolidations - likely continual reaspiration  11/12 persistent leukocytosis  11/13, decreased height of fever, decreased leukocytosis, Procalcitonin decreased to 52% of the 11/11 value suggesting good therapeutic response. s/p endoscopic replacement of feeding tube into jejunum  - Rectal tube came out  11/15 sleepy  - leukocytosis almost completely resolved    11/18 disturbing decompensation with clinical toxicity, unresponsive, increased leukocytosis, lactic acidosis   extensive antibiotic exposure with chronic Pseudomonas colonization of upper airway makes antibiotic resistance likely  Dental ASSESSMENT appreciated : Bedside Exam: No evidence of swelling, abscess or fistula. However, odontogenic infection cannot be fully ruled out without dental radiographs. No aspiration [of tooth] rk at this time.    11/19 increased leukocytosis, persistent lactic acidosis,   decreased ProCalcitonin  11/21  blood work improved  - I discussed with daughter who is considering comfort care at home after current antibiotic course is completed  11/22 reponsive now, clinically improved  11.25 improved, less toxic, with decreased leukocytosis and gratifying serial decline in ProCalcitonin value, though remain profoundly functionally impaired, generally somnolent and vulnerable  11/26 no immedate decompensation off antibiotics  11/29: Essentially unchanged off antibiotics. I do not see a role for antimicrobials at this time. Maxillofacial CT report reviewed- I do not thing that the risk:benefit of antibiotics treating periapical pathology is favorable at this point in time. I suspect the mastoid findings are related to recumbency and do not represent acute mastoiditis needing treatment. Would maintain a very high threshold for empiric antibiotics in the setting of recurrent C. difficile. Mental status poor.        Issues--  recurrent Cdiff  cerebral aneurysm s/p repair  chronic hypercapnic respiratory failure s/p trach (vent dependent) and chronic PEG 2022  anemia  - had iron deficiency  Pseudomonas aeruginosa upper airway colonization  (most recent isolate Resist to Cipro/Levo)  T2DM on insulin  10.14.24 -A1C: 5.8%  HTN  HLD  hypothyroidism  RA on Prednisone  Fibromyalgia  debility  sacral ulcer largely healed  malnourished/chronic catabolic state  aspiration    Suggestions--  Defer additional antibiotics  D/W randaoent's family at bedside and daughter by phone. All questions answered to the best of my ability..   Await repeat GOC discussion    If any ID input is needed over the weekend, please call [phone]. Thanks.    Adrinao Saravia MD  Attending Physician  [facility]  Division of Infectious Diseases  [phone]

## 2024-11-29 NOTE — PROGRESS NOTE ADULT - PROBLEM SELECTOR PLAN 5
will continue to follow for goc  if goals transition to comfort measures only with ventilator can consider the following symptom regimen  c/w fentanyl patch  can transition PO to IV and deescalate further work up and non-symptom management medications   Recommend:  Dilaudid 0.5 mg IV q2h prn dyspnea or tachypnea  Dilaudid 0.5 mg IV q2h prn severe pain and Dilaudid 0.2 mg IV q2h prn moderate pain  Ativan 0.5mg IV q2h prn agitation   bowel regimen while on opioids   Acetaminophen po 650 mg q6h prn fever  Zofran 4 mg IV q8h prn nausea or vomiting   if plan is for compassionate extubation please page PCU for symptom recs and ensure live on sign off   Can be reached by TEAMS M-F 9-5 Loni Amador Any other time please page [phone] if needed

## 2024-11-29 NOTE — GOALS OF CARE CONVERSATION - ADVANCED CARE PLANNING - CONVERSATION/DISCUSSION
Prognosis/MOLST Discussed
MOLST Discussed
Diagnosis/Prognosis/Treatment Options/Palliative Care Referral

## 2024-11-29 NOTE — PROGRESS NOTE ADULT - SUBJECTIVE AND OBJECTIVE BOX
Patient is a 74y old  Female who presents with a chief complaint of Patient admitted with Hypoxemia and episode of Bright red blood per rectum (29 Nov 2024 11:18)      Interval Events: No events reported over night.    REVIEW OF SYSTEMS:  [ ] Positive  [ ] All other systems negative  [X] Unable to assess ROS because ____Limited capacity to communicate but nods yes when asked if having pain; unable to localize pain complaint    Vital Signs Last 24 Hrs  T(C): 36.3 (11-28-24 @ 22:00), Max: 36.3 (11-28-24 @ 22:00)  T(F): 97.4 (11-28-24 @ 22:00), Max: 97.4 (11-28-24 @ 22:00)  HR: 73 (11-29-24 @ 11:26) (58 - 82)  BP: 181/74 (11-29-24 @ 06:00) (169/67 - 181/78)  RR: 20 (11-29-24 @ 06:00) (19 - 20)  SpO2: 100% (11-29-24 @ 11:26) (99% - 100%)    PHYSICAL EXAM:  HEENT:   [X] Tracheostomy: #8 XLT cuffed Shiley   [X] PERRL B/L;   [ ] No oral lesions  [ ] Abnormal    SKIN  [ ] No Rash  [ ] Abnormal  [X] pressure - sacral unstageable    CARDIAC  [X] Regular  [ ] Abnormal    PULMONARY  [ ] Bilateral Clear Breath Sounds  [ ] Normal Excursion  [X] Abnormal - mild coarse BS     GI  [X] PEG      [X] +BS		              [X] Soft, nondistended, nontender	  [X] Abnormal - old PEG site c/d/i, + rectal tube     MUSCULOSKELETAL                                   [X] Bedbound                 [ ] Abnormal    [ ] Ambulatory/OOB to chair                           EXTREMITIES                                         [ ] Normal  [X] Edema - mild generalized pitting edema                          NEUROLOGIC  [ ] Normal, non focal  [X] Focal findings: lethargic but following commands    PSYCHIATRIC  [x] Easily arousable, lethargic  [ ] Sedated	 [ ]Agitated    :  Mcbride: [ ] Yes, if yes: Date of Placement:                   [X] No    LINES: Central Lines [ ] Yes, if yes: Date of Placement                                     [X] No    HOSPITAL MEDICATIONS:  MEDICATIONS  (STANDING):  albuterol/ipratropium for Nebulization 3 milliLiter(s) Nebulizer every 6 hours  amLODIPine   Tablet 10 milliGRAM(s) Oral daily  artificial  tears Solution 1 Drop(s) Both EYES every 6 hours  Biotene Dry Mouth Oral Rinse 5 milliLiter(s) Swish and Spit every 6 hours  chlorhexidine 0.12% Liquid 15 milliLiter(s) Oral Mucosa every 12 hours  chlorhexidine 4% Liquid 1 Application(s) Topical daily  diclofenac sodium 1% Gel 2 Gram(s) Topical every 6 hours  escitalopram 10 milliGRAM(s) Oral <User Schedule>  fentaNYL   Patch  12 MICROgram(s)/Hr 1 Patch Transdermal every 72 hours  fentaNYL   Patch  25 MICROgram(s)/Hr 1 Patch Transdermal every 72 hours  FIRST- Mouthwash  BLM 5 milliLiter(s) Swish and Spit every 8 hours  gabapentin Solution 250 milliGRAM(s) Oral <User Schedule>  hemorrhoidal Ointment 1 Application(s) Rectal at bedtime  hydrALAZINE Injectable 10 milliGRAM(s) IV Push every 8 hours  influenza  Vaccine (HIGH DOSE) 0.5 milliLiter(s) IntraMuscular once  insulin glargine Injectable (LANTUS) 10 Unit(s) SubCutaneous <User Schedule>  insulin lispro (ADMELOG) corrective regimen sliding scale   SubCutaneous every 6 hours  lactobacillus acidophilus 1 Tablet(s) Oral <User Schedule>  levothyroxine 125 MICROGram(s) Oral <User Schedule>  lidocaine   4% Patch 4 Patch Transdermal every 24 hours  nystatin Powder 1 Application(s) Topical every 8 hours  predniSONE   Tablet 5 milliGRAM(s) Oral daily  sodium chloride 1 Gram(s) Oral every 8 hours  triamcinolone 0.1% Oral Paste 1 Application(s) Topical every 8 hours  witch hazel Pads 1 Application(s) Topical every 12 hours    MEDICATIONS  (PRN):  acetaminophen   Oral Liquid .. 650 milliGRAM(s) Oral every 6 hours PRN Temp greater or equal to 38C (100.4F), Mild Pain (1 - 3)  aluminum hydroxide/magnesium hydroxide/simethicone Suspension 30 milliLiter(s) Enteral Tube every 4 hours PRN Dyspepsia  oxyCODONE    Solution 5 milliGRAM(s) Oral every 6 hours PRN Moderate Pain (4 - 6)      LABS:    11-28    146[H]  |  112[H]  |  65[H]  ----------------------------<  161[H]  4.1   |  17[L]  |  0.64    Ca    9.1      28 Nov 2024 07:28  Phos  4.2     11-28  Mg     1.7     11-28        Urinalysis Basic - ( 28 Nov 2024 07:28 )    Color: x / Appearance: x / SG: x / pH: x  Gluc: 161 mg/dL / Ketone: x  / Bili: x / Urobili: x   Blood: x / Protein: x / Nitrite: x   Leuk Esterase: x / RBC: x / WBC x   Sq Epi: x / Non Sq Epi: x / Bacteria: x          CAPILLARY BLOOD GLUCOSE    MICROBIOLOGY:     RADIOLOGY:  [ ] Reviewed and interpreted by me    Mode: AC/ CMV (Assist Control/ Continuous Mandatory Ventilation)  RR (machine): 20  TV (machine): 400  FiO2: 40  PEEP: 5  ITime: 0.9  MAP: 15  PIP: 38

## 2024-11-29 NOTE — PROGRESS NOTE ADULT - PROBLEM SELECTOR PLAN 5
LDL goal <70 due to DM  Pt LDL NA  Order fasting lipid if not recently done  Statin as per primary team. Not getting statin at this time   Manage per primary team   F/u levels as out pt        Contact via [employer] Teams during business hours  To reach covering provider access AMION via sunrise tools  For Urgent matters/after-hours/weekends/holidays please page endocrine fellow on call   For nonurgent matters please email [email or web address]    Please note that this patient may be followed by different provider tomorrow.  Notify endocrine 24 hours prior to discharge for final recommendations

## 2024-11-29 NOTE — PROGRESS NOTE ADULT - PROBLEM SELECTOR PLAN 4
see goc note above  DNR/intubate  pt's  is surrogate see go note above  DNR/intubate  pt's  is surrogate  family remains undecided on next steps: continue medical management vs comfort care/capping care continue ventilator vs compassionate withdrawal of care

## 2024-11-29 NOTE — PROGRESS NOTE ADULT - NS ATTEND AMEND GEN_ALL_CORE FT
74F w/ PMH DM2 (insulin-dependent), HTN, HLD, hypothyroidism, RA on Prednisone, fibromyalgia, cerebral aneurysm s/p repair, chronic hypoxemia and hypercapnic respiratory failure s/p trach, vent-dependent, recurrent C diff infection, oropharyngeal dysphagia s/p G-J tube with persistent GJ tube leaks now s/p GC fistula repair 8/12, and recent presentation 5 days PTA for rectal bleeding requiring transfusion in the ED who now presents with acute hypoxemic respiratory distress 2/2 RML PNA, admitted to the RCU for further management. Found to have Pseudomonas aeruginosa in sputum culture and urine culture with ESBL E. coli s/p course of meropenem. Course complicated by antibiotic-associated diarrhea.     #Neuro: improved mentation today, answering yes/no questions. reports mouth pain on left side- dental evaluation recommending CT of head showing mastoid and middle ear cavity opacification and dental carries- no abscesses; will reach out to dental today after meeting   #CV: prior septic shock on midodrine; now hypertensive; restart amlodipine and c/w hydralazine  #Pulm: remains on full support on the vent. start chest PT q 12 hours and duonebs q6 hours given wheezing on exam and thick secretions  #ID: completed treatment on 11/25 for  PsA PNA and recurrent  C diff with zerbaxa and oral vanco respectively  #Renal/Fluid: monitor I/O; s/p IV diuresis for anasarca and weeping skin. Mild hypernatremia- will increase to 40q1 hour with tube feeds  #Heme: anemia from hemorrhoids, AOCD. s/p EGD without active bleeding, transfuse for Hb < 7  #Endo: transitioned to home prednisone; insulin management per endo  #GI: tolerating tube feeds; diarrhea improved  # Skin/Lines: sacral decub- wound care  #DVT ppx: SCDs; hold chemical dvt ppx with anemia if H/H stable today can restart DVT PPX.  #Dispo/Ethics:  Plan for family meeting today at 12:00PM 74F w/ PMH DM2 (insulin-dependent), HTN, HLD, hypothyroidism, RA on Prednisone, fibromyalgia, cerebral aneurysm s/p repair, chronic hypoxemia and hypercapnic respiratory failure s/p trach, vent-dependent, recurrent C diff infection, oropharyngeal dysphagia s/p G-J tube with persistent GJ tube leaks now s/p GC fistula repair 8/12, and recent presentation 5 days PTA for rectal bleeding requiring transfusion in the ED who now presents with acute hypoxemic respiratory distress 2/2 RML PNA, admitted to the RCU for further management. Found to have Pseudomonas aeruginosa in sputum culture and urine culture with ESBL E. coli s/p course of meropenem. Course complicated by antibiotic-associated diarrhea.     #Neuro: improved mentation today, answering yes/no questions. reports mouth pain on left side- dental evaluation recommending CT of head showing mastoid and middle ear cavity opacification and dental carries- no abscesses; will reach out to dental today after meeting   #CV: prior septic shock on midodrine; now hypertensive; restart amlodipine and c/w hydralazine  #Pulm: remains on full support on the vent. start chest PT q 12 hours and duonebs q6 hours given wheezing on exam and thick secretions  #ID: completed treatment on 11/25 for  PsA PNA and recurrent  C diff with zerbaxa and oral vanco respectively  #Renal/Fluid: monitor I/O; s/p IV diuresis for anasarca and weeping skin. Mild hypernatremia- will increase to 40q1 hour with tube feeds  #Heme: anemia from hemorrhoids, AOCD. s/p EGD without active bleeding, transfuse for Hb < 7  #Endo: transitioned to home prednisone; insulin management per endo  #GI: tolerating tube feeds; diarrhea improved  # Skin/Lines: sacral decub- wound care  #DVT ppx: SCDs; hold chemical dvt ppx with anemia if H/H stable today can restart DVT PPX.  #Dispo/Ethics:  Plan for family meeting today at 12:00PM- see GOC section

## 2024-11-29 NOTE — GOALS OF CARE CONVERSATION - ADVANCED CARE PLANNING - CONVERSATION DETAILS
Daughter Marysol and  called me back this evening to follow up on our earlier conversation. As per family they WOULD want her transferred to ICU and pressors initiated if needed. They would like to continue aggressive medical management however keep DNR in the event she has a cardiac arrest.
I discussed with daughter Marysol and patients  the events of the day.  The daughter and patients  decided not to escalate care to ICU.  They wish to keep patient here in RCU until family arrives in next few days and wish to compassionately extubate with comfort measures at that time.
[facility]AP continues to follow this case on RCU. IDT meeting held with patient's family today.    Team first introduced and reintroduced selves and roles in patient care. Family verbalized understanding and was receptive to meeting today. Team next provided overview of patient's current medical status. Team noted that patient with prolonged admission with complications throughout including multiple infections, GIB, and intermittently worsening O2 levels. Team discussed that patient without any meaningful improvement despite ongoing care provided, and note understanding family goals of not escalating to ICU level of care as well as patient remaining DNR. Team inquired into family thoughts regarding overall care moving forward.     Patient's son and spouse began by stating that, after family discussion held yesterday, 11/29, spouse and son both feel that it is now time to compassionately withdraw care as patient has been suffering and has undergone a tremendous amount in the last two years. Patient's daughters note, however, that they remain torn and continue to have hope and ronaldo that patient might still have some improvement. Family notes wishing that patient would have declared herself and passed on her own or at least inform family of her thoughts regarding next steps. Team validated this with family today but do note that patient has been determined to be without decision-making capacity at this time and thus, cannot provide true insight into her condition or options in care moving forward. Family verbalized understanding.     Family inquired into options available moving forward in care, and team discussed two primary options today. Team noted first option as continuing ongoing medical management and aggressive care, understanding that patient remains with overall poor prognosis. Team noted second option as pursuing a comfort-based approach, with either plan to compassionately withdraw ventilatory support vs capping care and allowing patient's body to take a natural course until passing. Family with many questions today regarding medications and other components of care should they opt for a comfort-based approach, and all questions answered appropriately. Team did note that regardless of choices made in care, patient may declare herself on her own and pass, and family verbalized understanding. Family requesting one more day of ongoing care and management prior to making a decision, but note that they will inform team of any decisions made. Team validated this today and much emotional support provided. Team additionally assured ongoing availability and support, and family verbalized appreciation.    Patient remains DNR. Family discussing next steps in care at present time, no other decisions made currently. GAP will continue to follow this case.    I, Thomas Hodge, where applicable, am scribing for and the presence of Loni Amador DNP, the following sections PARTICIPANTS, ADVANCED DIRECTIVES, CONVERSATION DISCUSSION, WHAT MATTERS MOST TO PATIENTS AND FAMILY, TREATMENT GUIDELINES, DATE OF MOLST, AND TIME SPENT.

## 2024-11-29 NOTE — PROGRESS NOTE ADULT - SUBJECTIVE AND OBJECTIVE BOX
SUBJECTIVE AND OBJECTIVE: pt seen and examined at bedside. pt lethargic on exam.   Indication for Geriatrics and Palliative Care Services/INTERVAL HPI: GOC     OVERNIGHT EVENTS: no acute events o/n     DNR on chart:Intubate  Intubate      Allergies    walnut (Unknown)  metronidazole (Rash)  Lyrica (Unknown)  penicillin (Unknown)  Pineapple (Unknown)  Tagamet (Unknown)  heparin (Unknown)  Pecans (Unknown)  Hazelnut (Unknown)  meropenem (Rash)    Intolerances    MEDICATIONS  (STANDING):  albuterol/ipratropium for Nebulization 3 milliLiter(s) Nebulizer every 6 hours  amLODIPine   Tablet 10 milliGRAM(s) Oral daily  artificial  tears Solution 1 Drop(s) Both EYES every 6 hours  Biotene Dry Mouth Oral Rinse 5 milliLiter(s) Swish and Spit every 6 hours  chlorhexidine 0.12% Liquid 15 milliLiter(s) Oral Mucosa every 12 hours  chlorhexidine 4% Liquid 1 Application(s) Topical daily  diclofenac sodium 1% Gel 2 Gram(s) Topical every 6 hours  escitalopram 10 milliGRAM(s) Oral <User Schedule>  fentaNYL   Patch  12 MICROgram(s)/Hr 1 Patch Transdermal every 72 hours  fentaNYL   Patch  25 MICROgram(s)/Hr 1 Patch Transdermal every 72 hours  FIRST- Mouthwash  BLM 5 milliLiter(s) Swish and Spit every 8 hours  gabapentin Solution 250 milliGRAM(s) Oral <User Schedule>  hemorrhoidal Ointment 1 Application(s) Rectal at bedtime  hydrALAZINE Injectable 10 milliGRAM(s) IV Push every 8 hours  influenza  Vaccine (HIGH DOSE) 0.5 milliLiter(s) IntraMuscular once  insulin glargine Injectable (LANTUS) 10 Unit(s) SubCutaneous <User Schedule>  insulin lispro (ADMELOG) corrective regimen sliding scale   SubCutaneous every 6 hours  lactobacillus acidophilus 1 Tablet(s) Oral <User Schedule>  levothyroxine 125 MICROGram(s) Oral <User Schedule>  lidocaine   4% Patch 4 Patch Transdermal every 24 hours  nystatin Powder 1 Application(s) Topical every 8 hours  predniSONE   Tablet 5 milliGRAM(s) Oral daily  sodium chloride 1 Gram(s) Oral every 8 hours  triamcinolone 0.1% Oral Paste 1 Application(s) Topical every 8 hours  witch hazel Pads 1 Application(s) Topical every 12 hours    MEDICATIONS  (PRN):  acetaminophen   Oral Liquid .. 650 milliGRAM(s) Oral every 6 hours PRN Temp greater or equal to 38C (100.4F), Mild Pain (1 - 3)  aluminum hydroxide/magnesium hydroxide/simethicone Suspension 30 milliLiter(s) Enteral Tube every 4 hours PRN Dyspepsia  oxyCODONE    Solution 5 milliGRAM(s) Oral every 6 hours PRN Moderate Pain (4 - 6)      ITEMS UNCHECKED ARE NOT PRESENT    PRESENT SYMPTOMS: [x ]Unable to self-report - see [ ] CPOT [x ] PAINADS [x ] RDOS  Source if other than patient:  [ ]Family   [ x]Team     Pain:  [ ]yes [ ]no  QOL impact -   Location -                    Aggravating factors -  Quality -  Radiation -  Timing-  Severity (0-10 scale):  Minimal acceptable level (0-10 scale):     CPOT:    https://www.[facility].org/getattachment/[email or web address]/Critical-Care-Pain-Observation-Tool-(CPOT)    PAINAD Score: See PAINAD tool and score below       Dyspnea:                           [ ]Mild [ ]Moderate [ ]Severe    RDOS: See RDOS tool and score below   0 to 2  minimal or no respiratory distress   3  mild distress  4 to 6 moderate distress  >7 severe distress      Anxiety:                             [ ]Mild [ ]Moderate [ ]Severe  Fatigue:                             [ ]Mild [ ]Moderate [ ]Severe  Nausea:                             [ ]Mild [ ]Moderate [ ]Severe  Loss of appetite:              [ ]Mild [ ]Moderate [ ]Severe  Constipation:                    [ ]Mild [ ]Moderate [ ]Severe    PCSSQ[Palliative Care Spiritual Screening Question]   Severity (0-10):  Score of 4 or > indicate consideration of Chaplaincy referral.  Chaplaincy Referral: [ ] yes [ ] refused [ ] following [ ] Deferred     Caregiver Baker? : [ ] yes [ ] no [ ] Deferred [ ] Declined             Social work referral [ ] Patient & Family Centered Care Referral [ ]     Anticipatory Grief present?:  [ ] yes [ ] no  [ ] Deferred                  Social work referral [ ] Chaplaincy Referral [ ]    		  Other Symptoms:  [x ]All other review of systems negative- pt AMS      Palliative Performance Status Version 2:   See PPSv2 tool and score below         PHYSICAL EXAM:  Vital Signs Last 24 Hrs  T(C): 36.3 (28 Nov 2024 22:00), Max: 36.3 (28 Nov 2024 22:00)  T(F): 97.4 (28 Nov 2024 22:00), Max: 97.4 (28 Nov 2024 22:00)  HR: 72 (29 Nov 2024 15:10) (58 - 82)  BP: 181/74 (29 Nov 2024 06:00) (169/67 - 181/78)  BP(mean): --  RR: 20 (29 Nov 2024 06:00) (19 - 20)  SpO2: 100% (29 Nov 2024 15:10) (99% - 100%)    Parameters below as of 29 Nov 2024 11:26  Patient On (Oxygen Delivery Method): ventilator     I&O's Summary    28 Nov 2024 07:01  -  29 Nov 2024 07:00  --------------------------------------------------------  IN: 1540 mL / OUT: 850 mL / NET: 690 mL       GENERAL: [ ]Cachexia    [ ]Alert  [ ]Oriented x   [x ]Lethargic  [ ]Unarousable  [ ]Verbal  [ ]Non-Verbal  Behavioral:   [ ]Anxiety  [ ]Delirium [ ]Agitation [ ]Other  HEENT:  [ ]Normal   [ x]Dry mouth   [ x]ET Tube/Trach  [ ]Oral lesions  PULMONARY:   [ ]Clear [ ]Tachypnea  [ ]Audible excessive secretions   [ ]Rhonchi        [ ]Right [ ]Left [ ]Bilateral  [ ]Crackles        [ ]Right [ ]Left [ ]Bilateral  [ ]Wheezing     [ ]Right [ ]Left [ ]Bilateral  [ x]Diminished BS [ ] Right [ ]Left [x ]Bilateral  CARDIOVASCULAR:    [x ]Regular [ ]Irregular [ ]Tachy  [ ]Red [ ]Murmur [ ]Other  GASTROINTESTINAL:  [x ]Soft  [ ]Distended   [ x]+BS  [x ]Non tender [ ]Tender  [ ]Other [x ]PEG [ ]OGT/ NGT   Last BM:   GENITOURINARY:  [ ]Normal [ ]Incontinent   [ ]Oliguria/Anuria   [ x]Mcbride  MUSCULOSKELETAL:   [ ]Normal   [x ]Weakness  [x ]Bed/Wheelchair bound [ ]Edema  NEUROLOGIC:   [ ]No focal deficits  [x ] Cognitive impairment  [ ] Dysphagia [ ]Dysarthria [ ] Paresis [ ]Other   SKIN:   [ ]Normal  [ ]Rash  [ ]Other  [ x]Pressure ulcer(s) [x ]y [ ]n present on admission    CRITICAL CARE:  [ ]Shock Present  [ ]Septic [ ]Cardiogenic [ ]Neurologic [ ]Hypovolemic  [ ]Vasopressors [ ]Inotropes  [ ]Respiratory failure present [x ]Mechanical Ventilation [ ]Non-invasive ventilatory support [ ]High-Flow Mode: AC/ CMV (Assist Control/ Continuous Mandatory Ventilation), RR (machine): 20, TV (machine): 400, FiO2: 40, PEEP: 5, ITime: 0.9, MAP: 14, PIP: 36  [ ]Acute  [ ]Chronic [ ]Hypoxic  [ ]Hypercarbic [ ]Other  [ ]Other organ failure     LABS:  11-28    146[H]  |  112[H]  |  65[H]  ----------------------------<  161[H]  4.1   |  17[L]  |  0.64    Ca    9.1      28 Nov 2024 07:28  Phos  4.2     11-28  Mg     1.7     11-28        Urinalysis Basic - ( 28 Nov 2024 07:28 )    Color: x / Appearance: x / SG: x / pH: x  Gluc: 161 mg/dL / Ketone: x  / Bili: x / Urobili: x   Blood: x / Protein: x / Nitrite: x   Leuk Esterase: x / RBC: x / WBC x   Sq Epi: x / Non Sq Epi: x / Bacteria: x      RADIOLOGY & ADDITIONAL STUDIES:    Protein Calorie Malnutrition Present: [ ]mild [ ]moderate [ ]severe [ ]underweight [ ]morbid obesity  https://www.andeal.org/vault/2440/web/files/ONC/Table_Clinical%20Characteristics%20to%20Document%20Malnutrition-White%20JV%20et%20al%202012.pdf    Height (cm): 149.9 (11-13-24 @ 13:27), 152.4 (06-20-24 @ 11:41), 152.4 (06-03-24 @ 18:13)  Weight (kg): 54.4 (11-13-24 @ 13:27), 56 (10-02-24 @ 21:56), 54.3 (08-11-24 @ 15:58)  BMI (kg/m2): 24.2 (11-13-24 @ 13:27), 24.1 (10-02-24 @ 21:56), 23.4 (08-11-24 @ 15:58)    [x ]PPSV2 < or = 30%  [ ]significant weight loss [ ]poor nutritional intake [ ]anasarca[ ]Artificial Nutrition    Other REFERRALS:  [ ]Hospice  [ ]Child Life  [ ]Social Work  [ ]Case management [ ]Holistic Therapy

## 2024-11-29 NOTE — PROGRESS NOTE ADULT - SUBJECTIVE AND OBJECTIVE BOX
[facility]  Division of Infectious Diseases  [phone]    Name: MAXX LOPEZ  Age: 74y  Gender: Female  MRN: [id number]    Interval History--  Notes reviewed.     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    Allergies    walnut (Unknown)  metronidazole (Rash)  Lyrica (Unknown)  penicillin (Unknown)  Pineapple (Unknown)  Tagamet (Unknown)  heparin (Unknown)  Pecans (Unknown)  Hazelnut (Unknown)  meropenem (Rash)    Intolerances        Medications--  Antibiotics:    Immunologic:  influenza  Vaccine (HIGH DOSE) 0.5 milliLiter(s) IntraMuscular once    Other:  acetaminophen   Oral Liquid .. PRN  albuterol/ipratropium for Nebulization  aluminum hydroxide/magnesium hydroxide/simethicone Suspension PRN  amLODIPine   Tablet  artificial  tears Solution  Biotene Dry Mouth Oral Rinse  chlorhexidine 0.12% Liquid  chlorhexidine 4% Liquid  diclofenac sodium 1% Gel  escitalopram  fentaNYL   Patch  12 MICROgram(s)/Hr  fentaNYL   Patch  25 MICROgram(s)/Hr  FIRST- Mouthwash  BLM  gabapentin Solution  hemorrhoidal Ointment  hydrALAZINE Injectable  insulin glargine Injectable (LANTUS)  insulin lispro (ADMELOG) corrective regimen sliding scale  lactobacillus acidophilus  levothyroxine  lidocaine   4% Patch  magnesium sulfate  IVPB  nystatin Powder  oxyCODONE    Solution PRN  predniSONE   Tablet  sodium chloride  triamcinolone 0.1% Oral Paste  witch hazel Pads      Review of Systems--  A 10-point review of systems was obtained.     Pertinent positives and negatives--  Constitutional: No fevers. No Chills. No Rigors.   Cardiovascular: No chest pain. No palpitations.  Respiratory: No shortness of breath. No cough.  Gastrointestinal: No nausea or vomiting. No diarrhea or constipation.   Psychiatric: Pleasant. Appropriate affect.    Review of systems otherwise negative except as previously noted.    Physical Examination--  Vital Signs: T(F): 97.4 (11-28-24 @ 22:00), Max: 98.3 (11-28-24 @ 11:46)  HR: 74 (11-29-24 @ 09:18)  BP: 181/74 (11-29-24 @ 06:00)  RR: 20 (11-29-24 @ 06:00)  SpO2: 100% (11-29-24 @ 09:18)  Wt(kg): --  General: Nontoxic-appearing Female in no acute distress.  HEENT: AT/NC. PERRL. EOMI. Anicteric. Conjunctiva pink and moist. Oropharynx clear. Dentition fair.  Neck: Not rigid. No sense of mass.  Nodes: None palpable.  Lungs: Clear bilaterally without rales, wheezing or rhonchi  Heart: Regular rate and rhythm. No Murmur. No rub. No gallop. No palpable thrill.  Abdomen: Bowel sounds present and normoactive. Soft. Nondistended. Nontender. No sense of mass. No organomegaly.  Back: No spinal tenderness. No costovertebral angle tenderness.   Extremities: No cyanosis or clubbing. No edema.   Skin: Warm. Dry. Good turgor. No rash. No vasculitic stigmata.  Psychiatric: Appropriate affect and mood for situation.         Laboratory Studies--  CBC      Chemistries  11-28    146[H]  |  112[H]  |  65[H]  ----------------------------<  161[H]  4.1   |  17[L]  |  0.64    Ca    9.1      28 Nov 2024 07:28  Phos  4.2     11-28  Mg     1.7     11-28        Culture Data           [facility]  Division of Infectious Diseases  [phone]    Name: MAXX LOPEZ  Age: 74y  Gender: Female  MRN: [id number]    Covering Dr. SHERLY Newman    Interval History--  Notes reviewed. Seen earlier today. Trached on vent, noninteractive.       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    Allergies    walnut (Unknown)  metronidazole (Rash)  Lyrica (Unknown)  penicillin (Unknown)  Pineapple (Unknown)  Tagamet (Unknown)  heparin (Unknown)  Pecans (Unknown)  Hazelnut (Unknown)  meropenem (Rash)    Intolerances        Medications--  Antibiotics:    Immunologic:  influenza  Vaccine (HIGH DOSE) 0.5 milliLiter(s) IntraMuscular once    Other:  acetaminophen   Oral Liquid .. PRN  albuterol/ipratropium for Nebulization  aluminum hydroxide/magnesium hydroxide/simethicone Suspension PRN  amLODIPine   Tablet  artificial  tears Solution  Biotene Dry Mouth Oral Rinse  chlorhexidine 0.12% Liquid  chlorhexidine 4% Liquid  diclofenac sodium 1% Gel  escitalopram  fentaNYL   Patch  12 MICROgram(s)/Hr  fentaNYL   Patch  25 MICROgram(s)/Hr  FIRST- Mouthwash  BLM  gabapentin Solution  hemorrhoidal Ointment  hydrALAZINE Injectable  insulin glargine Injectable (LANTUS)  insulin lispro (ADMELOG) corrective regimen sliding scale  lactobacillus acidophilus  levothyroxine  lidocaine   4% Patch  magnesium sulfate  IVPB  nystatin Powder  oxyCODONE    Solution PRN  predniSONE   Tablet  sodium chloride  triamcinolone 0.1% Oral Paste  witch hazel Pads      Review of Systems--  Review of systems unable secondary to clinical condition.     Physical Examination--  Vital Signs: T(F): 97.4 (11-28-24 @ 22:00), Max: 98.3 (11-28-24 @ 11:46)  HR: 74 (11-29-24 @ 09:18)  BP: 181/74 (11-29-24 @ 06:00)  RR: 20 (11-29-24 @ 06:00)  SpO2: 100% (11-29-24 @ 09:18)  Wt(kg): --  General: Nontoxic-appearing Female in no acute distress.  HEENT: AT/NC. Pupils/EOM unable secondary to patient compliance. Oropharynx/dentition unable secondary to patient compliance.   Neck: Not rigid. No sense of mass. Trach CDI  Nodes: None palpable.  Lungs: Dec BS B  Heart: Regular rate and rhythm.   Abdomen: Bowel sounds present and normoactive. Soft. Nondistended. Nontender. PEG site dressed.   Back: Unable  Extremities: No cyanosis or clubbing. 2+ edema.   Skin: Warm. Dry. Good turgor. PIV ok.   Psychiatric: Unable      Laboratory Studies--  CBC      Chemistries  11-28    146[H]  |  112[H]  |  65[H]  ----------------------------<  161[H]  4.1   |  17[L]  |  0.64    Ca    9.1      28 Nov 2024 07:28  Phos  4.2     11-28  Mg     1.7     11-28    Culture Data  No new data

## 2024-11-29 NOTE — PROGRESS NOTE ADULT - SUBJECTIVE AND OBJECTIVE BOX
Seen earlier today with  at bedside    Chief Complaint: Diabetes Mellitus follow up    INTERVAL HX: Remains trach and on ventilator support. Did not respond to verbal stimuli  at the time of visit. She remains on 24 hour continuous tube feeding ( Casie Fram peptide 40 ml/hr for 24 hours). BGs in 100s at goal while on current insulin regimen.       Review of Systems:  Unable to obtain ROS due to AMS    Allergies    walnut (Unknown)  metronidazole (Rash)  Lyrica (Unknown)  penicillin (Unknown)  Pineapple (Unknown)  Tagamet (Unknown)  heparin (Unknown)  Pecans (Unknown)  Hazelnut (Unknown)  meropenem (Rash)    Intolerances      MEDICATIONS  (STANDING):  albuterol/ipratropium for Nebulization 3 milliLiter(s) Nebulizer every 6 hours  amLODIPine   Tablet 10 milliGRAM(s) Oral daily  artificial  tears Solution 1 Drop(s) Both EYES every 6 hours  Biotene Dry Mouth Oral Rinse 5 milliLiter(s) Swish and Spit every 6 hours  chlorhexidine 0.12% Liquid 15 milliLiter(s) Oral Mucosa every 12 hours  chlorhexidine 4% Liquid 1 Application(s) Topical daily  diclofenac sodium 1% Gel 2 Gram(s) Topical every 6 hours  escitalopram 10 milliGRAM(s) Oral <User Schedule>  fentaNYL   Patch  12 MICROgram(s)/Hr 1 Patch Transdermal every 72 hours  fentaNYL   Patch  25 MICROgram(s)/Hr 1 Patch Transdermal every 72 hours  FIRST- Mouthwash  BLM 5 milliLiter(s) Swish and Spit every 8 hours  gabapentin Solution 250 milliGRAM(s) Oral <User Schedule>  hemorrhoidal Ointment 1 Application(s) Rectal at bedtime  hydrALAZINE Injectable 10 milliGRAM(s) IV Push every 8 hours  influenza  Vaccine (HIGH DOSE) 0.5 milliLiter(s) IntraMuscular once  insulin glargine Injectable (LANTUS) 10 Unit(s) SubCutaneous <User Schedule>  insulin lispro (ADMELOG) corrective regimen sliding scale   SubCutaneous every 6 hours  lactobacillus acidophilus 1 Tablet(s) Oral <User Schedule>  levothyroxine 125 MICROGram(s) Oral <User Schedule>  lidocaine   4% Patch 4 Patch Transdermal every 24 hours  nystatin Powder 1 Application(s) Topical every 8 hours  predniSONE   Tablet 5 milliGRAM(s) Oral daily  sodium chloride 1 Gram(s) Oral every 8 hours  triamcinolone 0.1% Oral Paste 1 Application(s) Topical every 8 hours  witch hazel Pads 1 Application(s) Topical every 12 hours        insulin glargine Injectable (LANTUS)   10 Unit(s) SubCutaneous (11-29-24 @ 05:53)    insulin lispro (ADMELOG) corrective regimen sliding scale   1 Unit(s) SubCutaneous (11-29-24 @ 18:14)   1 Unit(s) SubCutaneous (11-29-24 @ 12:11)   1 Unit(s) SubCutaneous (11-29-24 @ 05:54)    levothyroxine   125 MICROGram(s) Oral (11-29-24 @ 06:03)    predniSONE   Tablet   5 milliGRAM(s) Oral (11-29-24 @ 05:52)        PHYSICAL EXAM:  VITALS: T(C): 36.2 (11-29-24 @ 17:30)  T(F): 97.2 (11-29-24 @ 17:30), Max: 97.4 (11-28-24 @ 22:00)  HR: 61 (11-29-24 @ 18:04) (58 - 74)  BP: 161/69 (11-29-24 @ 17:30) (161/69 - 181/78)  RR:  (18 - 20)  SpO2:  (99% - 100%)  Wt(kg): --  GENERAL: Female laying in bed,  on ventilator support, NAD  Respiratory: Trach and on ventilator support   Extremities: Warm, no edema  NEURO: no response to verbal stimuli     LABS:  POCT Blood Glucose.: 173 mg/dL (11-29-24 @ 17:28)  POCT Blood Glucose.: 176 mg/dL (11-29-24 @ 12:01)  POCT Blood Glucose.: 155 mg/dL (11-29-24 @ 05:38)  POCT Blood Glucose.: 141 mg/dL (11-29-24 @ 00:24)  POCT Blood Glucose.: 148 mg/dL (11-28-24 @ 17:08)  POCT Blood Glucose.: 232 mg/dL (11-28-24 @ 11:37)  POCT Blood Glucose.: 187 mg/dL (11-28-24 @ 05:34)  POCT Blood Glucose.: 156 mg/dL (11-27-24 @ 23:49)  POCT Blood Glucose.: 150 mg/dL (11-27-24 @ 17:28)  POCT Blood Glucose.: 199 mg/dL (11-27-24 @ 11:20)  POCT Blood Glucose.: 149 mg/dL (11-27-24 @ 05:05)  POCT Blood Glucose.: 208 mg/dL (11-26-24 @ 23:36)      11-28    146[H]  |  112[H]  |  65[H]  ----------------------------<  161[H]  4.1   |  17[L]  |  0.64    Ca    9.1      28 Nov 2024 07:28  Phos  4.2     11-28  Mg     1.7     11-28          Thyroid Function Tests:      A1C with Estimated Average Glucose Result: 5.8 % (10-14-24 @ 05:08)    Estimated Average Glucose: 120 mg/dL (10-14-24 @ 05:08)        Diet, NPO with Tube Feed:   Tube Feeding Modality: Jejunostomy  Within3 Peptide 1.5 (KFPEPT1.5RTH)  Total Volume for 24 Hours (mL): 960  Continuous  Until Goal Tube Feed Rate (mL per Hour): 40  Tube Feed Duration (in Hours): 24  Tube Feed Start Time: 06:00  Free Water Flush  Pump   Rate (mL per Hour): 40   Frequency: Every Hour  Free Water Flush Instructions:  40 ml free water flushes Q1hr  Alfred(7 Gm Arginine/7 Gm Glut/1.2 Gm HMB     Qty per Day:  2  No Carb Prosource (1pkg = 15gms Protein)     Qty per Day:  1  Banatrol TF     Qty per Day:  1/2 packet per daily (11-29-24 @ 09:18) [Active]

## 2024-11-29 NOTE — PROGRESS NOTE ADULT - PROBLEM SELECTOR PLAN 1
- Patient now receiving palliative care services. Family meeting will be held on 11/29/24   Please monitor blood glucose values q 6 hours while on tube feeding or NPO  - Continue Lantus to 10u QAM  - Continue off standing admelog for now.  - continue Admelog correction scale q 6 hours to low dose while on 24 hour tube feeding   - Please inform Endocrine team for any changes in tube feeding,  steroid  - Please keep all IV abx in NS solution if possible!  Discharge Plan:  - TBD depending on final decisions of family and on  insulin requirement if discharged on tube feeding regimen (Bolus vs continuous tube feeding)   - If bolus tube feeding > Lantus plus Humalog   - Patient should have BGs checked 4x a day while on TFs.    Daughter aware to contact Endocrinologist if BG <70mg/dL x1 or >200mg/dL consistently or >400mg/dL x1.   - Followup with Dr Ruth: [facility]: [address] [place] [address]. [address]. [place] [address]. Tel: [phone] Telemedicine- daughter to schedule.   - Make sure pt has Rx for all DM supplies and insulin

## 2024-11-29 NOTE — PROGRESS NOTE ADULT - PROBLEM SELECTOR PLAN 2
[] Sepsis  - Sputum cx 11/18: CRE PSA, Urine cx 11/18:E.facelis ( Likely colonized/ UA Unimpressive ), Bld cx 11/18: NGTD   - Hypotension resolved Midodrine dcd, Solu-cortef Dcd  - Treated with Zerbaxa ( 11/18-11/25)     [] PNA   - Sputum cx 10/8: PSA  - CXR 10/7: Opacification of the right middle lobe may represent pneumonia versus atelectasis  - CT A/P 10/18: ? Multifocal pneumonia  - Completed Initial course of cefepime on 10/12  - Sputum 11/4: PSA; Repeat CT C/A/P 11/6: Consolidative opacity LLL  - Cefepime restarted 11/5-->11/15; in setting of recurrent fevers  - CT CAP on 11/11 showed left greater than right basilar consolidation and patchy groundglass opacity in both lungs consistent with atelectasis and/or pneumonia.     [] C.Diff   - Stool C.diff: + 11/1   - S/p Flagyl 11/3-11/8  - Oral Vanco 11/1-11/25

## 2024-11-30 NOTE — PROGRESS NOTE ADULT - SUBJECTIVE AND OBJECTIVE BOX
Patient is a 74y old  Female who presents with a chief complaint of Patient admitted with Hypoxemia and episode of Bright red blood per rectum (29 Nov 2024 18:27)      Interval Events:    REVIEW OF SYSTEMS:  [ ] Positive  [ ] All other systems negative  [ ] Unable to assess ROS because ________    Vital Signs Last 24 Hrs  T(C): 36.3 (11-30-24 @ 05:08), Max: 36.3 (11-30-24 @ 00:04)  T(F): 97.3 (11-30-24 @ 05:08), Max: 97.3 (11-30-24 @ 00:04)  HR: 67 (11-30-24 @ 06:09) (61 - 74)  BP: 135/68 (11-30-24 @ 05:08) (128/55 - 161/69)  RR: 20 (11-30-24 @ 05:08) (18 - 20)  SpO2: 100% (11-30-24 @ 06:09) (98% - 100%)    PHYSICAL EXAM:  HEENT:   [ ]Tracheostomy:  [ ]Pupils equal  [ ]No oral lesions  [ ]Abnormal    SKIN  [ ]No Rash  [ ] Abnormal  [ ] pressure    CARDIAC  [ ]Regular  [ ]Abnormal    PULMONARY  [ ]Bilateral Clear Breath Sounds  [ ]Normal Excursion  [ ]Abnormal    GI  [ ]PEG      [ ] +BS		              [ ]Soft, nondistended, nontender	  [ ]Abnormal    MUSCULOSKELETAL                                   [ ]Bedbound                 [ ]Abnormal    [ ]Ambulatory/OOB to chair                           EXTREMITIES                                         [ ]Normal  [ ]Edema                           NEUROLOGIC  [ ] Normal, non focal  [ ] Focal findings:    PSYCHIATRIC  [ ]Alert and appropriate  [ ] Sedated	 [ ]Agitated    :  Mcbride: [ ] Yes, if yes: Date of Placement:                   [  ] No    LINES: Central Lines [ ] Yes, if yes: Date of Placement                                     [  ] No    HOSPITAL MEDICATIONS:  MEDICATIONS  (STANDING):  albuterol/ipratropium for Nebulization 3 milliLiter(s) Nebulizer every 6 hours  amLODIPine   Tablet 10 milliGRAM(s) Oral daily  artificial  tears Solution 1 Drop(s) Both EYES every 6 hours  Biotene Dry Mouth Oral Rinse 5 milliLiter(s) Swish and Spit every 6 hours  chlorhexidine 0.12% Liquid 15 milliLiter(s) Oral Mucosa every 12 hours  chlorhexidine 4% Liquid 1 Application(s) Topical daily  diclofenac sodium 1% Gel 2 Gram(s) Topical every 6 hours  escitalopram 10 milliGRAM(s) Oral <User Schedule>  fentaNYL   Patch  12 MICROgram(s)/Hr 1 Patch Transdermal every 72 hours  fentaNYL   Patch  25 MICROgram(s)/Hr 1 Patch Transdermal every 72 hours  FIRST- Mouthwash  BLM 5 milliLiter(s) Swish and Spit every 8 hours  gabapentin Solution 250 milliGRAM(s) Oral <User Schedule>  hemorrhoidal Ointment 1 Application(s) Rectal at bedtime  hydrALAZINE Injectable 10 milliGRAM(s) IV Push every 8 hours  influenza  Vaccine (HIGH DOSE) 0.5 milliLiter(s) IntraMuscular once  insulin glargine Injectable (LANTUS) 10 Unit(s) SubCutaneous <User Schedule>  insulin lispro (ADMELOG) corrective regimen sliding scale   SubCutaneous every 6 hours  lactobacillus acidophilus 1 Tablet(s) Oral <User Schedule>  levothyroxine 125 MICROGram(s) Oral <User Schedule>  lidocaine   4% Patch 4 Patch Transdermal every 24 hours  nystatin Powder 1 Application(s) Topical every 8 hours  predniSONE   Tablet 5 milliGRAM(s) Oral daily  sodium chloride 1 Gram(s) Oral every 8 hours  triamcinolone 0.1% Oral Paste 1 Application(s) Topical every 8 hours  witch hazel Pads 1 Application(s) Topical every 12 hours    MEDICATIONS  (PRN):  acetaminophen   Oral Liquid .. 650 milliGRAM(s) Oral every 6 hours PRN Temp greater or equal to 38C (100.4F), Mild Pain (1 - 3)  aluminum hydroxide/magnesium hydroxide/simethicone Suspension 30 milliLiter(s) Enteral Tube every 4 hours PRN Dyspepsia  oxyCODONE    Solution 5 milliGRAM(s) Oral every 6 hours PRN Moderate Pain (4 - 6)      LABS:                        8.3    22.01 )-----------( 111      ( 30 Nov 2024 07:04 )             29.9     11-30    147[H]  |  114[H]  |  52[H]  ----------------------------<  164[H]  4.3   |  20[L]  |  0.60    Ca    9.5      30 Nov 2024 07:03  Phos  3.5     11-30  Mg     2.0     11-30        Urinalysis Basic - ( 30 Nov 2024 07:03 )    Color: x / Appearance: x / SG: x / pH: x  Gluc: 164 mg/dL / Ketone: x  / Bili: x / Urobili: x   Blood: x / Protein: x / Nitrite: x   Leuk Esterase: x / RBC: x / WBC x   Sq Epi: x / Non Sq Epi: x / Bacteria: x          CAPILLARY BLOOD GLUCOSE    MICROBIOLOGY:     RADIOLOGY:  [ ] Reviewed and interpreted by me    Mode: AC/ CMV (Assist Control/ Continuous Mandatory Ventilation)  RR (machine): 20  TV (machine): 400  FiO2: 40  PEEP: 5  ITime: 1  MAP: 14  PIP: 41   Patient is a 74y old  Female who presents with a chief complaint of Patient admitted with Hypoxemia and episode of Bright red blood per rectum (29 Nov 2024 18:27)      Interval Events: No events reported over night    REVIEW OF SYSTEMS:  [X] Positive: Pain (unable to specify)  [ ] All other systems negative  [ ] Unable to assess ROS because ___    Vital Signs Last 24 Hrs  T(C): 36.3 (11-30-24 @ 05:08), Max: 36.3 (11-30-24 @ 00:04)  T(F): 97.3 (11-30-24 @ 05:08), Max: 97.3 (11-30-24 @ 00:04)  HR: 67 (11-30-24 @ 06:09) (61 - 74)  BP: 135/68 (11-30-24 @ 05:08) (128/55 - 161/69)  RR: 20 (11-30-24 @ 05:08) (18 - 20)  SpO2: 100% (11-30-24 @ 06:09) (98% - 100%)      PHYSICAL EXAM:  HEENT:   [X] Tracheostomy: #8 XLT cuffed Shiley   [X] PERRL B/L;   [ ] No oral lesions  [ ] Abnormal    SKIN  [ ] No Rash  [ ] Abnormal  [X] pressure - sacral unstageable    CARDIAC  [X] Regular  [ ] Abnormal    PULMONARY  [ ] Bilateral Clear Breath Sounds  [ ] Normal Excursion  [X] Abnormal - mild coarse BS     GI  [X] PEG      [X] +BS		              [X] Soft, nondistended, nontender	  [X] Abnormal - old PEG site c/d/i, + rectal tube     MUSCULOSKELETAL                                   [X] Bedbound                 [ ] Abnormal    [ ] Ambulatory/OOB to chair                           EXTREMITIES                                         [ ] Normal  [X] Edema - mild generalized pitting edema                          NEUROLOGIC  [ ] Normal, non focal  [X] Focal findings: lethargic but following commands    PSYCHIATRIC  [X] Lethargic,   [ ] Sedated	 [ ]Agitated    :  Mcbride: [ ] Yes, if yes: Date of Placement:                   [X] No    LINES: Central Lines [ ] Yes, if yes: Date of Placement                                     [X] No      HOSPITAL MEDICATIONS:  MEDICATIONS  (STANDING):  albuterol/ipratropium for Nebulization 3 milliLiter(s) Nebulizer every 6 hours  amLODIPine   Tablet 10 milliGRAM(s) Oral daily  artificial  tears Solution 1 Drop(s) Both EYES every 6 hours  Biotene Dry Mouth Oral Rinse 5 milliLiter(s) Swish and Spit every 6 hours  chlorhexidine 0.12% Liquid 15 milliLiter(s) Oral Mucosa every 12 hours  chlorhexidine 4% Liquid 1 Application(s) Topical daily  diclofenac sodium 1% Gel 2 Gram(s) Topical every 6 hours  escitalopram 10 milliGRAM(s) Oral <User Schedule>  fentaNYL   Patch  12 MICROgram(s)/Hr 1 Patch Transdermal every 72 hours  fentaNYL   Patch  25 MICROgram(s)/Hr 1 Patch Transdermal every 72 hours  FIRST- Mouthwash  BLM 5 milliLiter(s) Swish and Spit every 8 hours  gabapentin Solution 250 milliGRAM(s) Oral <User Schedule>  hemorrhoidal Ointment 1 Application(s) Rectal at bedtime  hydrALAZINE Injectable 10 milliGRAM(s) IV Push every 8 hours  influenza  Vaccine (HIGH DOSE) 0.5 milliLiter(s) IntraMuscular once  insulin glargine Injectable (LANTUS) 10 Unit(s) SubCutaneous <User Schedule>  insulin lispro (ADMELOG) corrective regimen sliding scale   SubCutaneous every 6 hours  lactobacillus acidophilus 1 Tablet(s) Oral <User Schedule>  levothyroxine 125 MICROGram(s) Oral <User Schedule>  lidocaine   4% Patch 4 Patch Transdermal every 24 hours  nystatin Powder 1 Application(s) Topical every 8 hours  predniSONE   Tablet 5 milliGRAM(s) Oral daily  sodium chloride 1 Gram(s) Oral every 8 hours  triamcinolone 0.1% Oral Paste 1 Application(s) Topical every 8 hours  witch hazel Pads 1 Application(s) Topical every 12 hours    MEDICATIONS  (PRN):  acetaminophen   Oral Liquid .. 650 milliGRAM(s) Oral every 6 hours PRN Temp greater or equal to 38C (100.4F), Mild Pain (1 - 3)  aluminum hydroxide/magnesium hydroxide/simethicone Suspension 30 milliLiter(s) Enteral Tube every 4 hours PRN Dyspepsia  oxyCODONE    Solution 5 milliGRAM(s) Oral every 6 hours PRN Moderate Pain (4 - 6)      LABS:                        8.3    22.01 )-----------( 111      ( 30 Nov 2024 07:04 )             29.9     11-30    147[H]  |  114[H]  |  52[H]  ----------------------------<  164[H]  4.3   |  20[L]  |  0.60    Ca    9.5      30 Nov 2024 07:03  Phos  3.5     11-30  Mg     2.0     11-30        Urinalysis Basic - ( 30 Nov 2024 07:03 )    Color: x / Appearance: x / SG: x / pH: x  Gluc: 164 mg/dL / Ketone: x  / Bili: x / Urobili: x   Blood: x / Protein: x / Nitrite: x   Leuk Esterase: x / RBC: x / WBC x   Sq Epi: x / Non Sq Epi: x / Bacteria: x          CAPILLARY BLOOD GLUCOSE    MICROBIOLOGY:     RADIOLOGY:  [ ] Reviewed and interpreted by me    Mode: AC/ CMV (Assist Control/ Continuous Mandatory Ventilation)  RR (machine): 20  TV (machine): 400  FiO2: 40  PEEP: 5  ITime: 1  MAP: 14  PIP: 41

## 2024-11-30 NOTE — PROGRESS NOTE ADULT - NS ATTEND AMEND GEN_ALL_CORE FT
74F w/ PMH DM2 (insulin-dependent), HTN, HLD, hypothyroidism, RA on Prednisone, fibromyalgia, cerebral aneurysm s/p repair, chronic hypoxemia and hypercapnic respiratory failure s/p trach, vent-dependent, recurrent C diff infection, oropharyngeal dysphagia s/p G-J tube with persistent GJ tube leaks now s/p GC fistula repair 8/12, and recent presentation 5 days PTA for rectal bleeding requiring transfusion in the ED who now presents with acute hypoxemic respiratory distress 2/2 RML PNA, admitted to the RCU for further management. Found to have Pseudomonas aeruginosa in sputum culture and urine culture with ESBL E. coli s/p course of meropenem. Course complicated by antibiotic-associated diarrhea. Now improved.     #Neuro: improved mentation from admission, answering yes/no questions. reports mouth pain on left side- dental evaluation recommended CT of head showing mastoid and middle ear cavity opacification and dental carries- no abscesses; will f/u with dental team  #CV: prior septic shock on midodrine; now hypertensive; restart amlodipine and c/w hydralazine  #Pulm: remains on full support on the vent. Will c/w airway clearance therapy with chest PT q 12 hours and duonebs q6 hours given wheezing on exam and thick secretions, now resolved  #ID: completed treatment on 11/25 for  PsA PNA and recurrent C diff with zerbaxa and oral vanco respectively  #Renal/Fluid: monitor I/O; s/p IV diuresis for anasarca and weeping skin. Mild hypernatremia now improving, will increase free water again today  #Heme: anemia from hemorrhoids, AOCD. s/p EGD without active bleeding, transfuse for Hb < 7  #Endo: transitioned to home prednisone; insulin management per endo  #GI: tolerating tube feeds; diarrhea improved  # Skin/Lines: sacral decub- wound care  #DVT ppx: SCDs; hold chemical DVT ppx with anemia if H/H stable today can restart DVT PPX.  #Dispo/Ethics:  Pt DNR/DNI. [facility] meeting 11/29 as documented in note.

## 2024-11-30 NOTE — PROGRESS NOTE ADULT - ASSESSMENT
73 yo F PMH T2DM on insulin, HTN, HLD, hypothyroidism, RA on Prednisone, Fibromyalgia, cerebral aneurysm s/p repair, chronic hypercapnic respiratory failure s/p trach (vent dependent) and chronic PEG 2022, recurrent C. diff infection (follows with Dr. Newman), persistent GJ tube leaks now s/p GC fistula repair 8/12 BIBEMS on 10/7 with daughter for respiratory distress, hypoxia to 88%.  Pt also noted to have rectal bleeding week prior to presentation requiring transfusion 5 days ago in ED as per daughter. Daughter also reports brownish discharge from G-J tube. In ED, pt afebrile, fiO2 96-98% on 40% on ventilator.  Chest X-ray w/ opacification of right lung concerning for possible PNA.  Admitted to RCU for further management. Sputum cxs grew PSA; treated with course of Cefepime. Patient with decreased H+H received 1 unit of PRBCS on 10/10.  10/14 EGD w/ Dr. Rosales for PEGJ exchange and clippings placed for closure of fistula site.  Persistent fevers treated with meropenem and vanc (d/c'd 10/20).  CT A/P without infectious source.  Continued fevers and persistent leukocytosis 11/1 CDiff positive - po vanco started, IV flagyl added (11/3-11/8). Head CT performed on 11/6 due to concern of lethargy no acute intracranial findings were noted; likely related to metabolic encephalopathy. Patient continued to have persistent fevers and was empirically treated with Cefepime (11/5-11/9) for CR PSA in sputum cx. CT C/A/P performed 11/6 c/w consolidative opacity LLL. Evening of 11/10-11/11, WBC 15 --> 21, procal 1 --> 43, lactate 3.2 -- pt also febrile. Infectious w/u sent 11/10 -- BCx negative, trach aspirate cx pending, UA negative. CT CAP on 11/11 showed left greater than right basilar consolidation and patchy ground glass opacity in both lungs consistent with atelectasis and/or pneumonia; Feeding tube coils in the stomach with tip within the first portion of the duodenum, No bowel obstruction. Cefepime restarted 11/11-11/15 with improvement in leukocytosis. Patient underwent Endoscopic adjustment of peg 11/13 with Dr. Rosales. XR tube check performed. Patient became Septic again on 11/19 requiring Midodrine, Solucortef and HC03 drip; Patient was empirically placed on Zerbaxa.     11/27: No events reported overnight. Patient is scheduled for Maxiofacial CT to evaluate facial swelling as per Dental Recommendations. Patient with elevated BP will resume Norvasc. Patient also remains with anasarca will give Lasix 20mg IVP X1.   11/28:  No acute events.  CT done last night, no findings that correlate with swollen face.  Sinus opacification increased compared to prior imaging.  Daughter updated over phone.  Pending family meeting on 11/29 to further discuss goals of care given patient's current state.   11/29: No acute events.  Awaiting family decision post goals of care meeting to determine nature of hospitalization moving forward. 75 yo F PMH T2DM on insulin, HTN, HLD, hypothyroidism, RA on Prednisone, Fibromyalgia, cerebral aneurysm s/p repair, chronic hypercapnic respiratory failure s/p trach (vent dependent) and chronic PEG 2022, recurrent C. diff infection (follows with Dr. Newman), persistent GJ tube leaks now s/p GC fistula repair 8/12 BIBEMS on 10/7 with daughter for respiratory distress, hypoxia to 88%.  Pt also noted to have rectal bleeding week prior to presentation requiring transfusion 5 days ago in ED as per daughter. Daughter also reports brownish discharge from G-J tube. In ED, pt afebrile, fiO2 96-98% on 40% on ventilator.  Chest X-ray w/ opacification of right lung concerning for possible PNA.  Admitted to RCU for further management. Sputum cxs grew PSA; treated with course of Cefepime. Patient with decreased H+H received 1 unit of PRBCS on 10/10.  10/14 EGD w/ Dr. Rosales for PEGJ exchange and clippings placed for closure of fistula site.  Persistent fevers treated with meropenem and vanc (d/c'd 10/20).  CT A/P without infectious source.  Continued fevers and persistent leukocytosis 11/1 CDiff positive - po vanco started, IV flagyl added (11/3-11/8). Head CT performed on 11/6 due to concern of lethargy no acute intracranial findings were noted; likely related to metabolic encephalopathy. Patient continued to have persistent fevers and was empirically treated with Cefepime (11/5-11/9) for CR PSA in sputum cx. CT C/A/P performed 11/6 c/w consolidative opacity LLL. Evening of 11/10-11/11, WBC 15 --> 21, procal 1 --> 43, lactate 3.2 -- pt also febrile. Infectious w/u sent 11/10 -- BCx negative, trach aspirate cx pending, UA negative. CT CAP on 11/11 showed left greater than right basilar consolidation and patchy ground glass opacity in both lungs consistent with atelectasis and/or pneumonia; Feeding tube coils in the stomach with tip within the first portion of the duodenum, No bowel obstruction. Cefepime restarted 11/11-11/15 with improvement in leukocytosis. Patient underwent Endoscopic adjustment of peg 11/13 with Dr. Rosales. XR tube check performed. Patient became Septic again on 11/19 requiring Midodrine, Solucortef and HC03 drip; Patient was empirically placed on Zerbaxa.     11/27: No events reported overnight. Patient is scheduled for Maxiofacial CT to evaluate facial swelling as per Dental Recommendations. Patient with elevated BP will resume Norvasc. Patient also remains with anasarca will give Lasix 20mg IVP X1.   11/28:  No acute events.  CT done last night, no findings that correlate with swollen face.  Sinus opacification increased compared to prior imaging.  Daughter updated over phone.  Pending family meeting on 11/29 to further discuss goals of care given patient's current state.   11/29: No acute events.  Awaiting family decision post goals of care meeting to determine nature of hospitalization moving forward.  11/30: No acute events.  Free water increased given hypernatremia.  73 yo F PMH T2DM on insulin, HTN, HLD, hypothyroidism, RA on Prednisone, Fibromyalgia, cerebral aneurysm s/p repair, chronic hypercapnic respiratory failure s/p trach (vent dependent) and chronic PEG 2022, recurrent C. diff infection (follows with Dr. Newman), persistent GJ tube leaks now s/p GC fistula repair 8/12 BIBEMS on 10/7 with daughter for respiratory distress, hypoxia to 88%.  Pt also noted to have rectal bleeding week prior to presentation requiring transfusion 5 days ago in ED as per daughter. Daughter also reports brownish discharge from G-J tube. In ED, pt afebrile, fiO2 96-98% on 40% on ventilator.  Chest X-ray w/ opacification of right lung concerning for possible PNA.  Admitted to RCU for further management. Sputum cxs grew PSA; treated with course of Cefepime. Patient with decreased H+H received 1 unit of PRBCS on 10/10.  10/14 EGD w/ Dr. Rosales for PEGJ exchange and clippings placed for closure of fistula site.  Persistent fevers treated with meropenem and vanc (d/c'd 10/20).  CT A/P without infectious source.  Continued fevers and persistent leukocytosis 11/1 CDiff positive - po vanco started, IV flagyl added (11/3-11/8). Head CT performed on 11/6 due to concern of lethargy no acute intracranial findings were noted; likely related to metabolic encephalopathy. Patient continued to have persistent fevers and was empirically treated with Cefepime (11/5-11/9) for CR PSA in sputum cx. CT C/A/P performed 11/6 c/w consolidative opacity LLL. Evening of 11/10-11/11, WBC 15 --> 21, procal 1 --> 43, lactate 3.2 -- pt also febrile. Infectious w/u sent 11/10 -- BCx negative, trach aspirate cx pending, UA negative. CT CAP on 11/11 showed left greater than right basilar consolidation and patchy ground glass opacity in both lungs consistent with atelectasis and/or pneumonia; Feeding tube coils in the stomach with tip within the first portion of the duodenum, No bowel obstruction. Cefepime restarted 11/11-11/15 with improvement in leukocytosis. Patient underwent Endoscopic adjustment of peg 11/13 with Dr. Rosales. XR tube check performed. Patient became Septic again on 11/19 requiring Midodrine, Solucortef and HC03 drip; Patient was empirically placed on Zerbaxa.     11/27: No events reported overnight. Patient is scheduled for Maxiofacial CT to evaluate facial swelling as per Dental Recommendations. Patient with elevated BP will resume Norvasc. Patient also remains with anasarca will give Lasix 20mg IVP X1.   11/28:  No acute events.  CT done last night, no findings that correlate with swollen face.  Sinus opacification increased compared to prior imaging.  Daughter updated over phone.  Pending family meeting on 11/29 to further discuss goals of care given patient's current state.   11/29: No acute events.  Awaiting family decision post goals of care meeting to determine nature of hospitalization moving forward.  11/30: No acute events.  Free water increased given hypernatremia.  Following goals of care meeting yesterday, A follow up discussion was had today with Daughter and patient's .  They decided to pursue comfort measures while continuing mechanical ventilation.  They agree with discontinuing further phlebotomy, not initiating antibiotics, no more diagnostic work--ups.  Goal is optimizing pain control. 73 yo F PMH T2DM on insulin, HTN, HLD, hypothyroidism, RA on Prednisone, Fibromyalgia, cerebral aneurysm s/p repair, chronic hypercapnic respiratory failure s/p trach (vent dependent) and chronic PEG 2022, recurrent C. diff infection (follows with Dr. Newman), persistent GJ tube leaks now s/p GC fistula repair 8/12 BIBEMS on 10/7 with daughter for respiratory distress, hypoxia to 88%.  Pt also noted to have rectal bleeding week prior to presentation requiring transfusion 5 days ago in ED as per daughter. Daughter also reports brownish discharge from G-J tube. In ED, pt afebrile, fiO2 96-98% on 40% on ventilator.  Chest X-ray w/ opacification of right lung concerning for possible PNA.  Admitted to RCU for further management. Sputum cxs grew PSA; treated with course of Cefepime. Patient with decreased H+H received 1 unit of PRBCS on 10/10.  10/14 EGD w/ Dr. Rosales for PEGJ exchange and clippings placed for closure of fistula site.  Persistent fevers treated with meropenem and vanc (d/c'd 10/20).  CT A/P without infectious source.  Continued fevers and persistent leukocytosis 11/1 CDiff positive - po vanco started, IV flagyl added (11/3-11/8). Head CT performed on 11/6 due to concern of lethargy no acute intracranial findings were noted; likely related to metabolic encephalopathy. Patient continued to have persistent fevers and was empirically treated with Cefepime (11/5-11/9) for CR PSA in sputum cx. CT C/A/P performed 11/6 c/w consolidative opacity LLL. Evening of 11/10-11/11, WBC 15 --> 21, procal 1 --> 43, lactate 3.2 -- pt also febrile. Infectious w/u sent 11/10 -- BCx negative, trach aspirate cx pending, UA negative. CT CAP on 11/11 showed left greater than right basilar consolidation and patchy ground glass opacity in both lungs consistent with atelectasis and/or pneumonia; Feeding tube coils in the stomach with tip within the first portion of the duodenum, No bowel obstruction. Cefepime restarted 11/11-11/15 with improvement in leukocytosis. Patient underwent Endoscopic adjustment of peg 11/13 with Dr. Rosales. XR tube check performed. Patient became Septic again on 11/19 requiring Midodrine, Solucortef and HC03 drip; Patient was empirically placed on Zerbaxa.     11/27: No events reported overnight. Patient is scheduled for Maxiofacial CT to evaluate facial swelling as per Dental Recommendations. Patient with elevated BP will resume Norvasc. Patient also remains with anasarca will give Lasix 20mg IVP X1.   11/28:  No acute events.  CT done last night, no findings that correlate with swollen face.  Sinus opacification increased compared to prior imaging.  Daughter updated over phone.  Pending family meeting on 11/29 to further discuss goals of care given patient's current state.   11/29: No acute events.  Awaiting family decision post goals of care meeting to determine nature of hospitalization moving forward.  11/30: No acute events.  Free water increased given hypernatremia.  Following goals of care meeting yesterday, A follow up discussion was had today with Daughter and patient's .  They decided to pursue comfort measures while continuing mechanical ventilation.  They agree with discontinuing further phlebotomy, not initiating antibiotics, no more diagnostic work--ups and no pressors.  Goal is optimizing pain control.

## 2024-11-30 NOTE — PROGRESS NOTE ADULT - PROBLEM SELECTOR PLAN 9
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  - CT CAP on 11/11 shows feeding tube coils in the stomach with tip within the first portion of the duodenum  - G- J Tube repositioned on 11/13 via upper endoscopy   - Both feeds and medications to be given through feeding/J-port  - G-port is placed to continuous bag drainage for decompression  - Continue Maalox TID for gaseous distention  - GI  is following General

## 2024-11-30 NOTE — PROGRESS NOTE ADULT - PROBLEM SELECTOR PLAN 12
- Patients Daughter updated at bedside by RCU Team today  - MOLST Updated 11/19: Remains DNR / + Intubate, No Pressors / No transfer to ICU Setting   - Ongoing GOC discussions; Daughter continues to discuss options with family; currently cont medical management  - Family meeting to be held on 11/29 @ Noon with Children and  with Palliative Care - Patients Daughter updated at bedside by RCU Team today  - MOLST Updated 11/19: Remains DNR / + Intubate, No Pressors / No transfer to ICU Setting   - Ongoing GOC discussions; Daughter continues to discuss options with family; currently cont medical management  - 11/29:   GOC discussion with palliative care and family held.  It was decided to pursue comfort measures with mechanical ventilation.  They agree with discontinuing further phlebotomy, not initiating antibiotics, no more diagnostic work--ups.  Goal is optimizing pain control. - Patients Daughter updated at bedside by RCU Team today  - MOLST Updated 11/19: Remains DNR / + Intubate, No Pressors / No transfer to ICU Setting   - Ongoing GOC discussions; Daughter continues to discuss options with family; currently cont medical management  - 11/29:   GOC discussion with palliative care and family held.  It was decided to pursue comfort measures with mechanical ventilation.  They agree with discontinuing further phlebotomy, not initiating antibiotics, no more diagnostic work-ups, no pressors.  Goal is optimizing pain control.  - Palliative care recommendations appreciated: Dilaudid 0.5 mg IV q2h prn dyspnea or tachypnea  Dilaudid 0.5 mg IV q2h prn severe pain and Dilaudid 0.2 mg IV q2h prn moderate pain  Ativan 0.5mg IV q2h prn agitation

## 2024-12-01 NOTE — PROGRESS NOTE ADULT - PROBLEM SELECTOR PLAN 3
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  No focal intra-abdominal/pelvic or subcutaneous collections.  - Occult 10/7: Positive  - Transfused on 10/10 and 10/30 and 11/12 with appropriate response in H+H  - Heme consult appreciated

## 2024-12-01 NOTE — CHART NOTE - NSCHARTNOTEFT_GEN_A_CORE
Patients Daughter Marysol updated over the phone this evening. D/w her the possibility of discontinuing fingersticks and insulin administration to prevent further blood draws and discomfort for her mother. Marysol would like to continue Insulin / BGM Monitoring and Anti-HTN today as she wants time to discuss this with her dad this evening and prepare him for their plan to slowly wean off medications. However Daughter Marysol was okay with discontinuation of Synthroid administration today.

## 2024-12-01 NOTE — PROGRESS NOTE ADULT - PROBLEM SELECTOR PLAN 6
- Patient on Lantus and Humalog at home    - Standing Ademalog Dcd   - Currently remains Lantus and ISS  - Will d/w family discontinuation of BGM and Insulin administration

## 2024-12-01 NOTE — PROGRESS NOTE ADULT - PROBLEM SELECTOR PLAN 11
- Patients family have decided to pursue comfort measures while continuing mechanical ventilation  - No further phlebotomy, not initiating antibiotics, no more diagnostic work--ups and no pressors  - Families Goal is optimizing pain control  - Will continue Gabapentin and Fentanyl Patches ( Renew 12/3)  - Continue Dilaudid 0.5 mg IV q2h prn dyspnea or tachypnea  - Dilaudid 0.5 mg IV q2h prn severe pain and Dilaudid 0.2 mg IV q2h prn moderate pain  - Ativan 0.5mg IV q2h prn agitation - Patients family have decided to pursue comfort measures   - Families Goal is optimizing pain control  - Will continue Gabapentin and Fentanyl Patches ( Renew 12/3)  - Continue Dilaudid 0.5 mg IV q2h prn dyspnea or tachypnea  - Dilaudid 0.5 mg IV q2h prn severe pain and Dilaudid 0.2 mg IV q2h prn moderate pain  - Ativan 0.5mg IV q2h prn agitation

## 2024-12-01 NOTE — PROGRESS NOTE ADULT - SUBJECTIVE AND OBJECTIVE BOX
Patient is a 74y old  Female who presents with a chief complaint of Patient admitted with Hypoxemia and episode of Bright red blood per rectum (30 Nov 2024 07:39)      Interval Events: No events reported overnight     REVIEW OF SYSTEMS:  [ ] Positive  [ ] All other systems negative  [ ] Unable to assess ROS because ________    Vital Signs Last 24 Hrs  T(C): 36.4 (12-01-24 @ 05:20), Max: 36.4 (11-30-24 @ 20:23)  T(F): 97.6 (12-01-24 @ 05:20), Max: 97.6 (11-30-24 @ 20:23)  HR: 79 (12-01-24 @ 05:40) (65 - 84)  BP: 151/67 (12-01-24 @ 05:20) (151/67 - 173/68)  RR: 20 (12-01-24 @ 05:20) (20 - 20)  SpO2: 100% (12-01-24 @ 05:40) (98% - 100%)    PHYSICAL EXAM:  HEENT:   [ ]Tracheostomy:  [ ]Pupils equal  [ ]No oral lesions  [ ]Abnormal    SKIN  [ ]No Rash  [ ] Abnormal  [ ] pressure    CARDIAC  [ ]Regular  [ ]Abnormal    PULMONARY  [ ]Bilateral Clear Breath Sounds  [ ]Normal Excursion  [ ]Abnormal    GI  [ ]PEG      [ ] +BS		              [ ]Soft, nondistended, nontender	  [ ]Abnormal    MUSCULOSKELETAL                                   [ ]Bedbound                 [ ]Abnormal    [ ]Ambulatory/OOB to chair                           EXTREMITIES                                         [ ]Normal  [ ]Edema                           NEUROLOGIC  [ ] Normal, non focal  [ ] Focal findings:    PSYCHIATRIC  [ ]Alert and appropriate  [ ] Sedated	 [ ]Agitated    :  Cmbride: [ ] Yes, if yes: Date of Placement:                   [  ] No    LINES: Central Lines [ ] Yes, if yes: Date of Placement                                     [  ] No    HOSPITAL MEDICATIONS:  MEDICATIONS  (STANDING):  acetaminophen   Oral Liquid .. 1000 milliGRAM(s) Oral every 8 hours  albuterol/ipratropium for Nebulization 3 milliLiter(s) Nebulizer every 6 hours  amLODIPine   Tablet 10 milliGRAM(s) Oral daily  artificial  tears Solution 1 Drop(s) Both EYES every 6 hours  Biotene Dry Mouth Oral Rinse 5 milliLiter(s) Swish and Spit every 6 hours  chlorhexidine 0.12% Liquid 15 milliLiter(s) Oral Mucosa every 12 hours  chlorhexidine 4% Liquid 1 Application(s) Topical daily  diclofenac sodium 1% Gel 2 Gram(s) Topical every 6 hours  escitalopram 10 milliGRAM(s) Oral <User Schedule>  fentaNYL   Patch  12 MICROgram(s)/Hr 1 Patch Transdermal every 72 hours  fentaNYL   Patch  25 MICROgram(s)/Hr 1 Patch Transdermal every 72 hours  FIRST- Mouthwash  BLM 5 milliLiter(s) Swish and Spit every 8 hours  gabapentin Solution 250 milliGRAM(s) Oral <User Schedule>  hemorrhoidal Ointment 1 Application(s) Rectal at bedtime  hydrALAZINE Injectable 10 milliGRAM(s) IV Push every 8 hours  influenza  Vaccine (HIGH DOSE) 0.5 milliLiter(s) IntraMuscular once  insulin glargine Injectable (LANTUS) 10 Unit(s) SubCutaneous <User Schedule>  insulin lispro (ADMELOG) corrective regimen sliding scale   SubCutaneous every 6 hours  lactobacillus acidophilus 1 Tablet(s) Oral <User Schedule>  levothyroxine 125 MICROGram(s) Oral <User Schedule>  lidocaine   4% Patch 4 Patch Transdermal every 24 hours  nystatin Powder 1 Application(s) Topical every 8 hours  predniSONE   Tablet 5 milliGRAM(s) Oral daily  sodium chloride 1 Gram(s) Oral every 8 hours  triamcinolone 0.1% Oral Paste 1 Application(s) Topical every 8 hours  witch hazel Pads 1 Application(s) Topical every 12 hours    MEDICATIONS  (PRN):  aluminum hydroxide/magnesium hydroxide/simethicone Suspension 30 milliLiter(s) Enteral Tube every 4 hours PRN Dyspepsia  HYDROmorphone  Injectable 0.5 milliGRAM(s) IV Push every 2 hours PRN Severe Pain (7 - 10)  HYDROmorphone  Injectable 0.2 milliGRAM(s) IV Push every 2 hours PRN Moderate Pain (4 - 6)  HYDROmorphone  Injectable 0.5 milliGRAM(s) IV Push every 2 hours PRN Dyspnea or Tachypnea  LORazepam   Injectable 0.5 milliGRAM(s) IV Push every 2 hours PRN Agitation      LABS:                        8.3    22.01 )-----------( 111      ( 30 Nov 2024 07:04 )             29.9     11-30    147[H]  |  114[H]  |  52[H]  ----------------------------<  164[H]  4.3   |  20[L]  |  0.60    Ca    9.5      30 Nov 2024 07:03  Phos  3.5     11-30  Mg     2.0     11-30        Urinalysis Basic - ( 30 Nov 2024 07:03 )    Color: x / Appearance: x / SG: x / pH: x  Gluc: 164 mg/dL / Ketone: x  / Bili: x / Urobili: x   Blood: x / Protein: x / Nitrite: x   Leuk Esterase: x / RBC: x / WBC x   Sq Epi: x / Non Sq Epi: x / Bacteria: x          CAPILLARY BLOOD GLUCOSE    MICROBIOLOGY:     RADIOLOGY:  [ ] Reviewed and interpreted by me    Mode: AC/ CMV (Assist Control/ Continuous Mandatory Ventilation)  RR (machine): 20  TV (machine): 400  FiO2: 30  PEEP: 5  ITime: 1  MAP: 19  PIP: 38   Patient is a 74y old  Female who presents with a chief complaint of Patient admitted with Hypoxemia and episode of Bright red blood per rectum (30 Nov 2024 07:39)      Interval Events: No events reported overnight     REVIEW OF SYSTEMS:  [ ] Positive  [x] All other systems negative  [ ] Unable to assess ROS because ________    Vital Signs Last 24 Hrs  T(C): 36.4 (12-01-24 @ 05:20), Max: 36.4 (11-30-24 @ 20:23)  T(F): 97.6 (12-01-24 @ 05:20), Max: 97.6 (11-30-24 @ 20:23)  HR: 79 (12-01-24 @ 05:40) (65 - 84)  BP: 151/67 (12-01-24 @ 05:20) (151/67 - 173/68)  RR: 20 (12-01-24 @ 05:20) (20 - 20)  SpO2: 100% (12-01-24 @ 05:40) (98% - 100%)    PHYSICAL EXAM:  HEENT:   [X] Tracheostomy: #8 XLT cuffed Shiley   [X] PERRL B/L;   [ ] No oral lesions  [ ] Abnormal    SKIN  [ ] No Rash  [ ] Abnormal  [X] Pressure: Sacral Unstageable    CARDIAC  [X] Regular  [ ] Abnormal    PULMONARY  [ ] Bilateral Clear Breath Sounds  [ ] Normal Excursion  [X] Abnormal: Coarse BS / Prolonged Expiratory phase     GI  [X] PEG      [X] +BS		              [X] Soft, nondistended, nontender	  [X] Abnormal: Old PEG site c/d/i      MUSCULOSKELETAL                                   [X] Bedbound                 [ ] Abnormal    [ ] Ambulatory/OOB to chair                           EXTREMITIES                                         [ ] Normal  [X] Edema: + Anasarca                          NEUROLOGIC  [ ] Normal, non focal  [X] Focal findings: lethargic but following commands    PSYCHIATRIC  [X] Lethargic   [ ] Sedated	 [ ]Agitated    :  Mcbride: [ ] Yes, if yes: Date of Placement:                   [X] No    LINES: Central Lines [ ] Yes, if yes: Date of Placement                                     [X] No    HOSPITAL MEDICATIONS:  MEDICATIONS  (STANDING):  acetaminophen   Oral Liquid .. 1000 milliGRAM(s) Oral every 8 hours  albuterol/ipratropium for Nebulization 3 milliLiter(s) Nebulizer every 6 hours  amLODIPine   Tablet 10 milliGRAM(s) Oral daily  artificial  tears Solution 1 Drop(s) Both EYES every 6 hours  Biotene Dry Mouth Oral Rinse 5 milliLiter(s) Swish and Spit every 6 hours  chlorhexidine 0.12% Liquid 15 milliLiter(s) Oral Mucosa every 12 hours  chlorhexidine 4% Liquid 1 Application(s) Topical daily  diclofenac sodium 1% Gel 2 Gram(s) Topical every 6 hours  escitalopram 10 milliGRAM(s) Oral <User Schedule>  fentaNYL   Patch  12 MICROgram(s)/Hr 1 Patch Transdermal every 72 hours  fentaNYL   Patch  25 MICROgram(s)/Hr 1 Patch Transdermal every 72 hours  FIRST- Mouthwash  BLM 5 milliLiter(s) Swish and Spit every 8 hours  gabapentin Solution 250 milliGRAM(s) Oral <User Schedule>  hemorrhoidal Ointment 1 Application(s) Rectal at bedtime  hydrALAZINE Injectable 10 milliGRAM(s) IV Push every 8 hours  influenza  Vaccine (HIGH DOSE) 0.5 milliLiter(s) IntraMuscular once  insulin glargine Injectable (LANTUS) 10 Unit(s) SubCutaneous <User Schedule>  insulin lispro (ADMELOG) corrective regimen sliding scale   SubCutaneous every 6 hours  lactobacillus acidophilus 1 Tablet(s) Oral <User Schedule>  levothyroxine 125 MICROGram(s) Oral <User Schedule>  lidocaine   4% Patch 4 Patch Transdermal every 24 hours  nystatin Powder 1 Application(s) Topical every 8 hours  predniSONE   Tablet 5 milliGRAM(s) Oral daily  sodium chloride 1 Gram(s) Oral every 8 hours  triamcinolone 0.1% Oral Paste 1 Application(s) Topical every 8 hours  witch hazel Pads 1 Application(s) Topical every 12 hours    MEDICATIONS  (PRN):  aluminum hydroxide/magnesium hydroxide/simethicone Suspension 30 milliLiter(s) Enteral Tube every 4 hours PRN Dyspepsia  HYDROmorphone  Injectable 0.5 milliGRAM(s) IV Push every 2 hours PRN Severe Pain (7 - 10)  HYDROmorphone  Injectable 0.2 milliGRAM(s) IV Push every 2 hours PRN Moderate Pain (4 - 6)  HYDROmorphone  Injectable 0.5 milliGRAM(s) IV Push every 2 hours PRN Dyspnea or Tachypnea  LORazepam   Injectable 0.5 milliGRAM(s) IV Push every 2 hours PRN Agitation      LABS:                        8.3    22.01 )-----------( 111      ( 30 Nov 2024 07:04 )             29.9     11-30    147[H]  |  114[H]  |  52[H]  ----------------------------<  164[H]  4.3   |  20[L]  |  0.60    Ca    9.5      30 Nov 2024 07:03  Phos  3.5     11-30  Mg     2.0     11-30        Urinalysis Basic - ( 30 Nov 2024 07:03 )    Color: x / Appearance: x / SG: x / pH: x  Gluc: 164 mg/dL / Ketone: x  / Bili: x / Urobili: x   Blood: x / Protein: x / Nitrite: x   Leuk Esterase: x / RBC: x / WBC x   Sq Epi: x / Non Sq Epi: x / Bacteria: x          CAPILLARY BLOOD GLUCOSE    MICROBIOLOGY:     RADIOLOGY:  [ ] Reviewed and interpreted by me    Mode: AC/ CMV (Assist Control/ Continuous Mandatory Ventilation)  RR (machine): 20  TV (machine): 400  FiO2: 30  PEEP: 5  ITime: 1  MAP: 19  PIP: 38

## 2024-12-01 NOTE — PROGRESS NOTE ADULT - PROBLEM SELECTOR PLAN 9
- Patient on Sodium Chloride tabs prior to admission   - Remains on small Volumes of free h20 flushes to prevent J -Tube from clogging  - NACL Tabs discontinued in setting of hypernatremia

## 2024-12-01 NOTE — PROGRESS NOTE ADULT - PROBLEM SELECTOR PLAN 1
- Patient with Chronic Trach at baseline   - Patient presented to ED with Hypoxemia in setting of PNA   - Mechanical vent: Volume ctrl  AC /CMV:  RR: 20 / PEEP: 5 fio2 30%  - Continue standing DUONEB q 6 hrs   - Continue Chest PT / Suctioning PRN

## 2024-12-01 NOTE — PROGRESS NOTE ADULT - ASSESSMENT
73 yo F PMH T2DM on insulin, HTN, HLD, hypothyroidism, RA on Prednisone, Fibromyalgia, cerebral aneurysm s/p repair, chronic hypercapnic respiratory failure s/p trach (vent dependent) and chronic PEG 2022, recurrent C. diff infection (follows with Dr. Newman), persistent GJ tube leaks now s/p GC fistula repair 8/12 BIBEMS on 10/7 with daughter for respiratory distress, hypoxia to 88%.  Pt also noted to have rectal bleeding week prior to presentation requiring transfusion 5 days ago in ED as per daughter. Daughter also reports brownish discharge from G-J tube. In ED, pt afebrile, fiO2 96-98% on 40% on ventilator.  Chest X-ray w/ opacification of right lung concerning for possible PNA.  Admitted to RCU for further management. Sputum cxs grew PSA; treated with course of Cefepime. Patient with decreased H+H received 1 unit of PRBCS on 10/10.  10/14 EGD w/ Dr. Rosales for PEGJ exchange and clippings placed for closure of fistula site.  Persistent fevers treated with meropenem and vanc (d/c'd 10/20).  CT A/P without infectious source.  Continued fevers and persistent leukocytosis 11/1 CDiff positive - po vanco started, IV flagyl added (11/3-11/8). Head CT performed on 11/6 due to concern of lethargy no acute intracranial findings were noted; likely related to metabolic encephalopathy. Patient continued to have persistent fevers and was empirically treated with Cefepime (11/5-11/9) for CR PSA in sputum cx. CT C/A/P performed 11/6 c/w consolidative opacity LLL. Evening of 11/10-11/11, WBC 15 --> 21, procal 1 --> 43, lactate 3.2 -- pt also febrile. Infectious w/u sent 11/10 -- BCx negative, trach aspirate cx pending, UA negative. CT CAP on 11/11 showed left greater than right basilar consolidation and patchy ground glass opacity in both lungs consistent with atelectasis and/or pneumonia; Feeding tube coils in the stomach with tip within the first portion of the duodenum, No bowel obstruction. Cefepime restarted 11/11-11/15 with improvement in leukocytosis. Patient underwent Endoscopic adjustment of peg 11/13 with Dr. Rosales. XR tube check performed. Patient became Septic again on 11/19 requiring Midodrine, Solucortef and HC03 drip; Patient was empirically placed on Zerbaxa completed 11/25. Multiple GOC discussions held during this admission; family meeting held on 11/29 with Palliative care. Ultimately decision was made by family to pursue comfort measures while continuing mechanical ventilation.  They agree with discontinuing further phlebotomy, not initiating antibiotics, no more diagnostic work--ups and no pressors.      12/1: No events reported overnight. Patients family have decided to pursue comfort measures while continuing mechanical ventilation.  They agree with discontinuing further phlebotomy, not initiating antibiotics, no more diagnostic work--ups and no pressors. Goal is optimizing pain control. Patient received 1 PRN Dose of Dilaudid yesterday evening.  Will discuss with family today discontinuation of Blood Glucose monitoring and insulin administration.

## 2024-12-01 NOTE — PROGRESS NOTE ADULT - NS ATTEND AMEND GEN_ALL_CORE FT
74F w/ PMH DM2 (insulin-dependent), HTN, HLD, hypothyroidism, RA on Prednisone, fibromyalgia, cerebral aneurysm s/p repair, chronic hypoxemia and hypercapnic respiratory failure s/p trach, vent-dependent, recurrent C diff infection, oropharyngeal dysphagia s/p G-J tube with persistent GJ tube leaks now s/p GC fistula repair 8/12, and recent presentation 5 days PTA for rectal bleeding requiring transfusion in the ED who now presents with acute hypoxemic respiratory distress 2/2 RML PNA, admitted to the RCU for further management. Found to have Pseudomonas aeruginosa in sputum culture and urine culture with ESBL E. coli s/p course of meropenem. Course complicated by antibiotic-associated diarrhea. Now improved.     #Neuro: improved mentation from admission, answering yes/no questions. reports mouth pain on left side- dental evaluation recommended CT of head showing mastoid and middle ear cavity opacification and dental carries- no abscesses; will f/u with dental team  #CV: prior septic shock on midodrine; now hypertensive; restart amlodipine and c/w hydralazine  #Pulm: remains on full support on the vent. Will c/w airway clearance therapy with chest PT q 12 hours and duonebs q6 hours given wheezing on exam and thick secretions, now resolved  #ID: completed treatment on 11/25 for  PsA PNA and recurrent C diff with zerbaxa and oral vanco respectively  #Renal/Fluid: monitor I/O; s/p IV diuresis for anasarca and weeping skin. Mild hypernatremia now improving, will increase free water again today  #Heme: anemia from hemorrhoids, AOCD. s/p EGD without active bleeding, transfuse for Hb < 7  #Endo: transitioned to home prednisone; insulin management per endo  #GI: tolerating tube feeds; diarrhea improved  # Skin/Lines: sacral decub- wound care  #DVT ppx: SCDs; hold chemical DVT ppx with anemia if H/H stable today can restart DVT PPX.  #Dispo/Ethics:  Pt DNR/DNI. [facility] meeting 11/29 as documented in note. 74F w/ PMH DM2 (insulin-dependent), HTN, HLD, hypothyroidism, RA on Prednisone, fibromyalgia, cerebral aneurysm s/p repair, chronic hypoxemia and hypercapnic respiratory failure s/p trach, vent-dependent, recurrent C diff infection, oropharyngeal dysphagia s/p G-J tube with persistent GJ tube leaks now s/p GC fistula repair 8/12, and recent presentation 5 days PTA for rectal bleeding requiring transfusion in the ED who now presents with acute hypoxemic respiratory distress 2/2 RML PNA, admitted to the RCU for further management. Found to have Pseudomonas aeruginosa in sputum culture and urine culture with ESBL E. coli s/p course of meropenem. Course complicated by antibiotic-associated diarrhea. Now improved.     #Neuro: improved mentation from admission, answering yes/no questions. reports mouth pain on left side- dental evaluation appreciated. No further w/u as not in line with [facility]  #CV: prior septic shock on midodrine; now hypertensive; restart amlodipine and c/w hydralazine  #Pulm: remains on full support on the vent. Will c/w airway clearance therapy with chest PT q 12 hours and duonebs q6 hours given wheezing on exam and thick secretions, now resolved  #ID: completed treatment on 11/25 for  PsA PNA and recurrent C diff with zerbaxa and oral vanco respectively  #Renal/Fluid: monitor I/O; s/p IV diuresis for anasarca and weeping skin. Diuresis prn for comfort.  #Heme: anemia from hemorrhoids, AOCD. s/p EGD without active bleeding, transfuse for Hb < 7  #Endo: transitioned to home prednisone; insulin management per endo  #GI: tolerating tube feeds; diarrhea improved  # Skin/Lines: sacral decub- wound care  #DVT ppx: SCDs  #Dispo/Ethics:  Pt DNR/DNI. [facility] meeting 11/29 as documented in note. Family Meeting held 11/29 by RCU Team and Palliative care. Family now agreeable to no further phlebotomy, no further initiation of antibiotics of IVF. No further diagnostic testing or aggressive measures. Dilaudid prn and anxiolytics added. Family at bedside, questions answered and support provided.

## 2024-12-01 NOTE — PROGRESS NOTE ADULT - PROBLEM SELECTOR PLAN 12
- Patients Daughter updated at bedside by RCU Team today  - MOLST Updated 11/19: Remains DNR / + Intubate, No Pressors / No transfer to ICU Setting   - Family Meeting   - 11/29: [facility] discussion with palliative care and family held.  It was decided to pursue comfort measures with mechanical ventilation.  They agree with discontinuing further phlebotomy, not initiating antibiotics, no more diagnostic work-ups, no pressors.  Goal is optimizing pain control.  - Palliative care recommendations appreciated: Dilaudid 0.5 mg IV q2h prn dyspnea or tachypnea  Dilaudid 0.5 mg IV q2h prn severe pain and Dilaudid 0.2 mg IV q2h prn moderate pain  Ativan 0.5mg IV q2h prn agitation - Patients Daughter updated at bedside by RCU Team today  - MOLST Updated 11/19: Remains DNR / + Intubate, No Pressors / No transfer to ICU Setting   - Family Meeting held 11/29 by RCU Team and Palliative care   - Family has agreed to No further phlebotomy, not initiating antibiotics, no more diagnostic work-ups, no pressors.    - Continue Comfort measures

## 2024-12-02 NOTE — PROGRESS NOTE ADULT - SUBJECTIVE AND OBJECTIVE BOX
SUBJECTIVE AND OBJECTIVE: pt seen and examined at bedside. pt demonstrating signs of pain, family endorsing the same. requested RN provide IVP Dilaudid   Indication for Geriatrics and Palliative Care Services/INTERVAL HPI: GOC/symptoms     OVERNIGHT EVENTS: no acute events o/n     DNR on chart:Intubate  Intubate      Allergies    walnut (Unknown)  metronidazole (Rash)  Lyrica (Unknown)  penicillin (Unknown)  Pineapple (Unknown)  Tagamet (Unknown)  heparin (Unknown)  Pecans (Unknown)  Hazelnut (Unknown)  meropenem (Rash)    Intolerances    MEDICATIONS  (STANDING):  acetaminophen   Oral Liquid .. 1000 milliGRAM(s) Oral every 8 hours  albuterol/ipratropium for Nebulization 3 milliLiter(s) Nebulizer every 6 hours  amLODIPine   Tablet 10 milliGRAM(s) Oral daily  artificial  tears Solution 1 Drop(s) Both EYES every 6 hours  Biotene Dry Mouth Oral Rinse 5 milliLiter(s) Swish and Spit every 6 hours  chlorhexidine 0.12% Liquid 15 milliLiter(s) Oral Mucosa every 12 hours  chlorhexidine 4% Liquid 1 Application(s) Topical daily  diclofenac sodium 1% Gel 2 Gram(s) Topical every 6 hours  escitalopram 10 milliGRAM(s) Oral <User Schedule>  FIRST- Mouthwash  BLM 5 milliLiter(s) Swish and Spit every 8 hours  gabapentin Solution 250 milliGRAM(s) Oral <User Schedule>  hemorrhoidal Ointment 1 Application(s) Rectal at bedtime  hydrALAZINE Injectable 10 milliGRAM(s) IV Push every 8 hours  influenza  Vaccine (HIGH DOSE) 0.5 milliLiter(s) IntraMuscular once  insulin glargine Injectable (LANTUS) 10 Unit(s) SubCutaneous <User Schedule>  insulin lispro (ADMELOG) corrective regimen sliding scale   SubCutaneous every 6 hours  lactobacillus acidophilus 1 Tablet(s) Oral <User Schedule>  lidocaine   4% Patch 4 Patch Transdermal every 24 hours  nystatin Powder 1 Application(s) Topical every 8 hours  predniSONE   Tablet 5 milliGRAM(s) Oral daily  triamcinolone 0.1% Oral Paste 1 Application(s) Topical every 8 hours  witch hazel Pads 1 Application(s) Topical every 12 hours    MEDICATIONS  (PRN):  aluminum hydroxide/magnesium hydroxide/simethicone Suspension 30 milliLiter(s) Enteral Tube every 4 hours PRN Dyspepsia  HYDROmorphone  Injectable 0.5 milliGRAM(s) IV Push every 2 hours PRN Severe Pain (7 - 10)  HYDROmorphone  Injectable 0.2 milliGRAM(s) IV Push every 2 hours PRN Moderate Pain (4 - 6)  HYDROmorphone  Injectable 0.5 milliGRAM(s) IV Push every 2 hours PRN Dyspnea or Tachypnea  LORazepam   Injectable 0.5 milliGRAM(s) IV Push every 2 hours PRN Agitation      ITEMS UNCHECKED ARE NOT PRESENT    PRESENT SYMPTOMS: [x ]Unable to self-report - see [ ] CPOT [ ] PAINADS [ ] RDOS  Source if other than patient:  [ ]Family   [x ]Team     Pain:  [x ]yes [ ]no  QOL impact -   Location -                    Aggravating factors -  Quality -  Radiation -  Timing-  Severity (0-10 scale):  Minimal acceptable level (0-10 scale):     CPOT:    https://www.[facility].org/getattachment/[email or web address]/Critical-Care-Pain-Observation-Tool-(CPOT)    PAINAD Score: See PAINAD tool and score below       Dyspnea:                           [ ]Mild [ ]Moderate [ ]Severe    RDOS: See RDOS tool and score below   0 to 2  minimal or no respiratory distress   3  mild distress  4 to 6 moderate distress  >7 severe distress      Anxiety:                             [ ]Mild [ ]Moderate [ ]Severe  Fatigue:                             [ ]Mild [ ]Moderate [ ]Severe  Nausea:                             [ ]Mild [ ]Moderate [ ]Severe  Loss of appetite:              [ ]Mild [ ]Moderate [ ]Severe  Constipation:                    [ ]Mild [ ]Moderate [ ]Severe    PCSSQ[Palliative Care Spiritual Screening Question]   Severity (0-10):  Score of 4 or > indicate consideration of Chaplaincy referral.  Chaplaincy Referral: [ ] yes [ ] refused [ ] following [ ] Deferred     Caregiver [place]lawson? : [ ] yes [ ] no [ ] Deferred [ ] Declined             Social work referral [ ] Patient & Family Centered Care Referral [ ]     Anticipatory Grief present?:  [ ] yes [ ] no  [ ] Deferred                  Social work referral [ ] Chaplaincy Referral [ ]    		  Other Symptoms:  [x ]All other review of systems negative     Palliative Performance Status Version 2:   See PPSv2 tool and score below         PHYSICAL EXAM:  Vital Signs Last 24 Hrs  T(C): 36.6 (02 Dec 2024 11:44), Max: 37.1 (01 Dec 2024 17:15)  T(F): 97.9 (02 Dec 2024 11:44), Max: 98.8 (01 Dec 2024 17:15)  HR: 79 (02 Dec 2024 11:44) (75 - 92)  BP: 127/71 (02 Dec 2024 11:44) (116/44 - 171/68)  BP(mean): --  RR: 20 (02 Dec 2024 11:44) (20 - 20)  SpO2: 99% (02 Dec 2024 11:44) (97% - 110%)    Parameters below as of 02 Dec 2024 11:44  Patient On (Oxygen Delivery Method): ventilator     I&O's Summary    01 Dec 2024 07:01  -  02 Dec 2024 07:00  --------------------------------------------------------  IN: 2340 mL / OUT: 800 mL / NET: 1540 mL    02 Dec 2024 07:01  -  02 Dec 2024 12:03  --------------------------------------------------------  IN: 0 mL / OUT: 175 mL / NET: -175 mL       GENERAL: [ ]Cachexia    [x ]Alert  [ ]Oriented x   [ ]Lethargic  [ ]Unarousable  [ ]Verbal  [ ]Non-Verbal  Behavioral:   [ ]Anxiety  [ ]Delirium [ ]Agitation [ ]Other  HEENT:  [ ]Normal   [ ]Dry mouth   [x ]ET Tube/Trach  [ ]Oral lesions  PULMONARY:   [ ]Clear [ ]Tachypnea  [ ]Audible excessive secretions   [ ]Rhonchi        [ ]Right [ ]Left [ ]Bilateral  [ ]Crackles        [ ]Right [ ]Left [ ]Bilateral  [ ]Wheezing     [ ]Right [ ]Left [ ]Bilateral  [ x]Diminished BS [ ] Right [ ]Left [x ]Bilateral  CARDIOVASCULAR:    [x ]Regular [ ]Irregular [ ]Tachy  [ ]Red [ ]Murmur [ ]Other  GASTROINTESTINAL:  [ x]Soft  [ ]Distended   [x ]+BS  [ x]Non tender [ ]Tender  [ ]Other [ x]PEG [ ]OGT/ NGT   Last BM:   GENITOURINARY:  [ ]Normal [ ]Incontinent   [ ]Oliguria/Anuria   [ ]Mcbride  MUSCULOSKELETAL:   [ ]Normal   [x ]Weakness  [x ]Bed/Wheelchair bound [ ]Edema  NEUROLOGIC:   [ ]No focal deficits  [ x] Cognitive impairment  [ ] Dysphagia [ ]Dysarthria [ ] Paresis [ ]Other   SKIN:   [ ]Normal  [ ]Rash  [ ]Other  [x ]Pressure ulcer(s) [x ]y [ ]n present on admission    CRITICAL CARE:  [ ]Shock Present  [ ]Septic [ ]Cardiogenic [ ]Neurologic [ ]Hypovolemic  [ ]Vasopressors [ ]Inotropes  [ ]Respiratory failure present [ ]Mechanical Ventilation [ ]Non-invasive ventilatory support [ ]High-Flow Mode: AC/ CMV (Assist Control/ Continuous Mandatory Ventilation), RR (machine): 20, TV (machine): 400, FiO2: 30, PEEP: 5, ITime: 1, MAP: 13, PIP: 31  [ ]Acute  [ ]Chronic [ ]Hypoxic  [ ]Hypercarbic [ ]Other  [ ]Other organ failure     LABS:            RADIOLOGY & ADDITIONAL STUDIES:    Protein Calorie Malnutrition Present: [ ]mild [ ]moderate [ ]severe [ ]underweight [ ]morbid obesity  https://www.andeal.org/vault/2440/web/files/ONC/Table_Clinical%20Characteristics%20to%20Document%20Malnutrition-White%20JV%20et%20al%014587.pdf    Height (cm): 149.9 (11-13-24 @ 13:27), 152.4 (06-20-24 @ 11:41), 152.4 (06-03-24 @ 18:13)  Weight (kg): 54.4 (11-13-24 @ 13:27), 56 (10-02-24 @ 21:56), 54.3 (08-11-24 @ 15:58)  BMI (kg/m2): 24.2 (11-13-24 @ 13:27), 24.1 (10-02-24 @ 21:56), 23.4 (08-11-24 @ 15:58)    [x ]PPSV2 < or = 30%  [ ]significant weight loss [ ]poor nutritional intake [ ]anasarca[ ]Artificial Nutrition    Other REFERRALS:  [ ]Hospice  [ ]Child Life  [ ]Social Work  [ ]Case management [ ]Holistic Therapy     Goals of Care Document:PAYAM Hodge (11-29-24 @ 15:30)  Goals of Care Conversation:   Participants:  · Participants  Family; Staff  · Spouse  Papito  · Child(sergio)  daughters Marysol and Lizzie, son Gerson  · Provider  Dr. Imtiaz Kimbrough DNP of [facility]  ·   Thomas Hodge [facility]    Advance Directives:  · Does patient have Advance Directive  Yes  · Indicate Type  Do Not Resuscitate (DNR); Medical Orders for Life-Sustaining Treatment (MOLST)  · Are any of the items on the chart  Yes  · Specify which ones are on chart  Do Not Resuscitate (DNR)  Medical Orders for Life-Sustaining Treatment (MOLST)  · Does Patient Have a Surrogate  Yes  · Surrogate's Name  spouse Papito  · Does the Patient have a Court Appointed Guardian ([id number])  No  · Caregiver:  yes  · Name  Marysol Sam  · Phone Number  [phone]    Conversation Discussion:  · ADVANCED CARE PLANNING  Voluntary discussion occurred addressing advanced care planning  · Conversation  Diagnosis; Prognosis; Treatment Options; Palliative Care Referral  · Conversation Details  [facility] continues to follow this case on RCU. IDT meeting held with patient's family today.    Team first introduced and reintroduced selves and roles in patient care. Family verbalized understanding and was receptive to meeting today. Team next provided overview of patient's current medical status. Team noted that patient with prolonged admission with complications throughout including multiple infections, GIB, and intermittently worsening O2 levels. Team discussed that patient without any meaningful improvement despite ongoing care provided, and note understanding family goals of not escalating to ICU level of care as well as patient remaining DNR. Team inquired into family thoughts regarding overall care moving forward.     Patient's son and spouse began by stating that, after family discussion held yesterday, 11/29, spouse and son both feel that it is now time to compassionately withdraw care as patient has been suffering and has undergone a tremendous amount in the last two years. Patient's daughters note, however, that they remain torn and continue to have hope and ronaldo that patient might still have some improvement. Family notes wishing that patient would have declared herself and passed on her own or at least inform family of her thoughts regarding next steps. Team validated this with family today but do note that patient has been determined to be without decision-making capacity at this time and thus, cannot provide true insight into her condition or options in care moving forward. Family verbalized understanding.     Family inquired into options available moving forward in care, and team discussed two primary options today. Team noted first option as continuing ongoing medical management and aggressive care, understanding that patient remains with overall poor prognosis. Team noted second option as pursuing a comfort-based approach, with either plan to compassionately withdraw ventilatory support vs capping care and allowing patient's body to take a natural course until passing. Family with many questions today regarding medications and other components of care should they opt for a comfort-based approach, and all questions answered appropriately. Team did note that regardless of choices made in care, patient may declare herself on her own and pass, and family verbalized understanding. Family requesting one more day of ongoing care and management prior to making a decision, but note that they will inform team of any decisions made. Team validated this today and much emotional support provided. Team additionally assured ongoing availability and support, and family verbalized appreciation.    Patient remains DNR. Family discussing next steps in care at present time, no other decisions made currently. [facility] will continue to follow this case.    I, Thomas Hodge, where applicable, am scribing for and the presence of Loni Amador DNP, the following sections PARTICIPANTS, ADVANCED DIRECTIVES, CONVERSATION DISCUSSION, WHAT MATTERS MOST TO PATIENTS AND FAMILY, TREATMENT GUIDELINES, DATE OF MOLST, AND TIME SPENT.    What Matters Most To Patient and Family:  · What matters most to patient and family  patient care, time spent with family    Personal Advance Directives Treatment Guidelines:   Treatment Guidelines:  · Decision Maker  Surrogate  · Treatment Guidelines  DNR    MOLST:  · Updated  19-Nov-2024    Location of Discussion:   Time Spent on Advance Care Planning:  Attending or PUJA Only.     I personally spent 80 minutes on advance care planning services with the patient. This time is separate and distinct from any other care management services provided on this date.    Location of Discussion:  · Location of discussion  Face to face      Electronic Signatures:  Thomas Hodge ([facility])  (Signed 29-Nov-2024 15:53)  	Authored: Goals of Care Conversation, Personal Advance Directives Treatment Guidelines, Location of Discussion      Last Updated: 29-Nov-2024 15:53 by Thomas Hodge ([facility] [place])

## 2024-12-02 NOTE — PROGRESS NOTE ADULT - SUBJECTIVE AND OBJECTIVE BOX
Patient is a 74y old  Female who presents with a chief complaint of Patient admitted with Hypoxemia and episode of Bright red blood per rectum (01 Dec 2024 08:24)      Interval Events:    REVIEW OF SYSTEMS:  [ ] Positive  [ ] All other systems negative  [ ] Unable to assess ROS because ________    Vital Signs Last 24 Hrs  T(C): 36.8 (12-02-24 @ 05:20), Max: 37.1 (12-01-24 @ 17:15)  T(F): 98.2 (12-02-24 @ 05:20), Max: 98.8 (12-01-24 @ 17:15)  HR: 81 (12-02-24 @ 05:51) (75 - 92)  BP: 171/68 (12-02-24 @ 05:20) (116/44 - 171/68)  RR: 20 (12-02-24 @ 05:20) (20 - 20)  SpO2: 97% (12-02-24 @ 05:51) (97% - 110%)    PHYSICAL EXAM:  HEENT:   [ ]Tracheostomy:  [ ]Pupils equal  [ ]No oral lesions  [ ]Abnormal    SKIN  [ ]No Rash  [ ] Abnormal  [ ] pressure    CARDIAC  [ ]Regular  [ ]Abnormal    PULMONARY  [ ]Bilateral Clear Breath Sounds  [ ]Normal Excursion  [ ]Abnormal    GI  [ ]PEG      [ ] +BS		              [ ]Soft, nondistended, nontender	  [ ]Abnormal    MUSCULOSKELETAL                                   [ ]Bedbound                 [ ]Abnormal    [ ]Ambulatory/OOB to chair                           EXTREMITIES                                         [ ]Normal  [ ]Edema                           NEUROLOGIC  [ ] Normal, non focal  [ ] Focal findings:    PSYCHIATRIC  [ ]Alert and appropriate  [ ] Sedated	 [ ]Agitated    :  Mcbride: [ ] Yes, if yes: Date of Placement:                   [  ] No    LINES: Central Lines [ ] Yes, if yes: Date of Placement                                     [  ] No    HOSPITAL MEDICATIONS:  MEDICATIONS  (STANDING):  acetaminophen   Oral Liquid .. 1000 milliGRAM(s) Oral every 8 hours  albuterol/ipratropium for Nebulization 3 milliLiter(s) Nebulizer every 6 hours  amLODIPine   Tablet 10 milliGRAM(s) Oral daily  artificial  tears Solution 1 Drop(s) Both EYES every 6 hours  Biotene Dry Mouth Oral Rinse 5 milliLiter(s) Swish and Spit every 6 hours  chlorhexidine 0.12% Liquid 15 milliLiter(s) Oral Mucosa every 12 hours  chlorhexidine 4% Liquid 1 Application(s) Topical daily  diclofenac sodium 1% Gel 2 Gram(s) Topical every 6 hours  escitalopram 10 milliGRAM(s) Oral <User Schedule>  FIRST- Mouthwash  BLM 5 milliLiter(s) Swish and Spit every 8 hours  gabapentin Solution 250 milliGRAM(s) Oral <User Schedule>  hemorrhoidal Ointment 1 Application(s) Rectal at bedtime  hydrALAZINE Injectable 10 milliGRAM(s) IV Push every 8 hours  influenza  Vaccine (HIGH DOSE) 0.5 milliLiter(s) IntraMuscular once  insulin glargine Injectable (LANTUS) 10 Unit(s) SubCutaneous <User Schedule>  insulin lispro (ADMELOG) corrective regimen sliding scale   SubCutaneous every 6 hours  lactobacillus acidophilus 1 Tablet(s) Oral <User Schedule>  lidocaine   4% Patch 4 Patch Transdermal every 24 hours  nystatin Powder 1 Application(s) Topical every 8 hours  predniSONE   Tablet 5 milliGRAM(s) Oral daily  triamcinolone 0.1% Oral Paste 1 Application(s) Topical every 8 hours  witch hazel Pads 1 Application(s) Topical every 12 hours    MEDICATIONS  (PRN):  aluminum hydroxide/magnesium hydroxide/simethicone Suspension 30 milliLiter(s) Enteral Tube every 4 hours PRN Dyspepsia  HYDROmorphone  Injectable 0.5 milliGRAM(s) IV Push every 2 hours PRN Severe Pain (7 - 10)  HYDROmorphone  Injectable 0.2 milliGRAM(s) IV Push every 2 hours PRN Moderate Pain (4 - 6)  HYDROmorphone  Injectable 0.5 milliGRAM(s) IV Push every 2 hours PRN Dyspnea or Tachypnea  LORazepam   Injectable 0.5 milliGRAM(s) IV Push every 2 hours PRN Agitation      LABS:                  CAPILLARY BLOOD GLUCOSE    MICROBIOLOGY:     RADIOLOGY:  [ ] Reviewed and interpreted by me    Mode: AC/ CMV (Assist Control/ Continuous Mandatory Ventilation)  RR (machine): 20  TV (machine): 400  FiO2: 30  PEEP: 5  ITime: 1  MAP: 15  PIP: 38   Patient is a 74y old  Female who presents with a chief complaint of Patient admitted with Hypoxemia and episode of Bright red blood per rectum (01 Dec 2024 08:24)    Interval Events:  No acute events overnight.    REVIEW OF SYSTEMS:  [ ] Positive  [ ] All other systems negative  [X] Unable to assess ROS because ____limited capacity to communicate____    Vital Signs Last 24 Hrs  T(C): 36.8 (12-02-24 @ 05:20), Max: 37.1 (12-01-24 @ 17:15)  T(F): 98.2 (12-02-24 @ 05:20), Max: 98.8 (12-01-24 @ 17:15)  HR: 81 (12-02-24 @ 05:51) (75 - 92)  BP: 171/68 (12-02-24 @ 05:20) (116/44 - 171/68)  RR: 20 (12-02-24 @ 05:20) (20 - 20)  SpO2: 97% (12-02-24 @ 05:51) (97% - 110%)    PHYSICAL EXAM:  HEENT:   [X]Tracheostomy: #8 XLT shiley  [X]Pupils equal  [ ]No oral lesions  [ ]Abnormal    SKIN  [ ]No Rash  [ ] Abnormal  [X] pressure - sacral unstageable     CARDIAC  [X]Regular  [ ]Abnormal    PULMONARY  [ ]Bilateral Clear Breath Sounds  [ ]Normal Excursion  [X]Abnormal - BL coarse BS    GI  [X]PEG      [X] +BS		              [X]Soft, distended, nontender	  [X]Abnormal - old PEG site leaking blood, dressing changed    MUSCULOSKELETAL                                   [X]Bedbound                 [ ]Abnormal    [ ]Ambulatory/OOB to chair                           EXTREMITIES                                         [ ]Normal  [X]Edema - generalized edema            NEUROLOGIC  [ ] Normal, non focal  [X] Focal findings: awake and alert, did not follow commands     PSYCHIATRIC  [X]Alert and appropriate  [ ] Sedated	 [ ]Agitated    :  Mcbride: [ ] Yes, if yes: Date of Placement:                   [X] No    LINES: Central Lines [ ] Yes, if yes: Date of Placement                                     [X] No    HOSPITAL MEDICATIONS:  MEDICATIONS  (STANDING):  acetaminophen   Oral Liquid .. 1000 milliGRAM(s) Oral every 8 hours  albuterol/ipratropium for Nebulization 3 milliLiter(s) Nebulizer every 6 hours  amLODIPine   Tablet 10 milliGRAM(s) Oral daily  artificial  tears Solution 1 Drop(s) Both EYES every 6 hours  Biotene Dry Mouth Oral Rinse 5 milliLiter(s) Swish and Spit every 6 hours  chlorhexidine 0.12% Liquid 15 milliLiter(s) Oral Mucosa every 12 hours  chlorhexidine 4% Liquid 1 Application(s) Topical daily  diclofenac sodium 1% Gel 2 Gram(s) Topical every 6 hours  escitalopram 10 milliGRAM(s) Oral <User Schedule>  FIRST- Mouthwash  BLM 5 milliLiter(s) Swish and Spit every 8 hours  gabapentin Solution 250 milliGRAM(s) Oral <User Schedule>  hemorrhoidal Ointment 1 Application(s) Rectal at bedtime  hydrALAZINE Injectable 10 milliGRAM(s) IV Push every 8 hours  influenza  Vaccine (HIGH DOSE) 0.5 milliLiter(s) IntraMuscular once  insulin glargine Injectable (LANTUS) 10 Unit(s) SubCutaneous <User Schedule>  insulin lispro (ADMELOG) corrective regimen sliding scale   SubCutaneous every 6 hours  lactobacillus acidophilus 1 Tablet(s) Oral <User Schedule>  lidocaine   4% Patch 4 Patch Transdermal every 24 hours  nystatin Powder 1 Application(s) Topical every 8 hours  predniSONE   Tablet 5 milliGRAM(s) Oral daily  triamcinolone 0.1% Oral Paste 1 Application(s) Topical every 8 hours  witch hazel Pads 1 Application(s) Topical every 12 hours    MEDICATIONS  (PRN):  aluminum hydroxide/magnesium hydroxide/simethicone Suspension 30 milliLiter(s) Enteral Tube every 4 hours PRN Dyspepsia  HYDROmorphone  Injectable 0.5 milliGRAM(s) IV Push every 2 hours PRN Severe Pain (7 - 10)  HYDROmorphone  Injectable 0.2 milliGRAM(s) IV Push every 2 hours PRN Moderate Pain (4 - 6)  HYDROmorphone  Injectable 0.5 milliGRAM(s) IV Push every 2 hours PRN Dyspnea or Tachypnea  LORazepam   Injectable 0.5 milliGRAM(s) IV Push every 2 hours PRN Agitation    LABS:    CAPILLARY BLOOD GLUCOSE    MICROBIOLOGY:     RADIOLOGY:  [ ] Reviewed and interpreted by me    Mode: AC/ CMV (Assist Control/ Continuous Mandatory Ventilation)  RR (machine): 20  TV (machine): 400  FiO2: 30  PEEP: 5  ITime: 1  MAP: 15  PIP: 38

## 2024-12-02 NOTE — PROGRESS NOTE ADULT - NS ATTEND AMEND GEN_ALL_CORE FT
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 Found to have Pseudomonas aeruginosa in sputum culture and urine culture with ESBL E. coli s/p course of meropenem. Course complicated by antibiotic-associated diarrhea.      #Neuro: improved mentation over the past few days. complains of pain.   #CV: prior septic shock on midodrine; now hypertensive; c/w amlodipine and hydralazine  #Pulm: remains on full support on the vent. c/w airway clearance therapy with chest PT q 12 hours and duonebs q6 hours  #ID: completed treatment on 11/25 for  PsA PNA and recurrent C diff with zerbaxa and oral vanco respectively  #Renal/Fluid: monitor I/O; s/p IV diuresis for anasarca and weeping skin. Diuresis prn for comfort.  #Heme: anemia from hemorrhoids, AOCD. s/p EGD without active bleeding, transfuse for Hb < 7  #Endo: transitioned to home prednisone; insulin management per endo  #GI: tolerating tube feeds; diarrhea improved  # Skin/Lines: sacral decub- wound care  #DVT ppx: SCDs  #Dispo/Ethics:  Pt DNR/DNI.   [facility] meeting 11/29 as documented in note. Family Meeting held 11/29 by RCU Team and Palliative care.   Family agreeable to no further phlebotomy, initiation of antibiotics or IVF. No further diagnostic testing or aggressive measures. Dilaudid prn and anxiolytics. Family at bedside, questions answered and support provided.

## 2024-12-02 NOTE — PROGRESS NOTE ADULT - PROBLEM SELECTOR PLAN 11
- Patients family have decided to pursue comfort measures   - Families Goal is optimizing pain control  - Will continue Gabapentin and Fentanyl Patches ( Renew 12/3)  - Continue Dilaudid 0.5 mg IV q2h prn dyspnea or tachypnea  - Dilaudid 0.5 mg IV q2h prn severe pain and Dilaudid 0.2 mg IV q2h prn moderate pain  - Ativan 0.5mg IV q2h prn agitation

## 2024-12-02 NOTE — PROGRESS NOTE ADULT - ASSESSMENT
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 Found to have Pseudomonas aeruginosa in sputum culture and urine culture with ESBL E. coli s/p course of meropenem. Course complicated by antibiotic-associated diarrhea.

## 2024-12-02 NOTE — PROGRESS NOTE ADULT - SUBJECTIVE AND OBJECTIVE BOX
[facility]  Division of Infectious Diseases  [phone]    Name: MAXX LOPEZ  Age: 74y  Gender: Female  MRN: [id number]    Interval History--  Notes reviewed. Afebrile vent, dependent, unchanged.     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    Allergies    walnut (Unknown)  metronidazole (Rash)  Lyrica (Unknown)  penicillin (Unknown)  Pineapple (Unknown)  Tagamet (Unknown)  heparin (Unknown)  Pecans (Unknown)  Hazelnut (Unknown)  meropenem (Rash)    Intolerances        Medications--  Antibiotics:    Immunologic:  influenza  Vaccine (HIGH DOSE) 0.5 milliLiter(s) IntraMuscular once    Other:  acetaminophen   Oral Liquid ..  albuterol/ipratropium for Nebulization  aluminum hydroxide/magnesium hydroxide/simethicone Suspension PRN  amLODIPine   Tablet  artificial  tears Solution  Biotene Dry Mouth Oral Rinse  chlorhexidine 0.12% Liquid  chlorhexidine 4% Liquid  diclofenac sodium 1% Gel  escitalopram  FIRST- Mouthwash  BLM  gabapentin Solution  hemorrhoidal Ointment  hydrALAZINE Injectable  HYDROmorphone  Injectable PRN  HYDROmorphone  Injectable PRN  HYDROmorphone  Injectable PRN  insulin glargine Injectable (LANTUS)  insulin lispro (ADMELOG) corrective regimen sliding scale  lactobacillus acidophilus  lidocaine   4% Patch  LORazepam   Injectable PRN  nystatin Powder  predniSONE   Tablet  triamcinolone 0.1% Oral Paste  witch hazel Pads      Review of Systems--  Review of systems unable secondary to clinical condition.       Physical Examination--  Vital Signs: T(F): 97.9 (12-02-24 @ 11:44), Max: 98.8 (12-01-24 @ 17:15)  HR: 79 (12-02-24 @ 11:44)  BP: 127/71 (12-02-24 @ 11:44)  RR: 20 (12-02-24 @ 11:44)  SpO2: 99% (12-02-24 @ 11:44)  Wt(kg): --  General: Nontoxic-appearing Female in no acute distress.  HEENT: AT/NC. Pupils/EOM unable secondary to patient compliance. Oropharynx/dentition unable secondary to patient compliance.   Neck: Not rigid. No sense of mass. Trach CDI  Nodes: None palpable.  Lungs: Dec BS B  Heart: Regular rate and rhythm.   Abdomen: Bowel sounds present and normoactive. Soft. Nondistended. Nontender. PEG site dressed.   Back: Unable  Extremities: No cyanosis or clubbing. 2+ edema.   Skin: Warm. Dry. Good turgor. PIV ok.   Psychiatric: Unable      Laboratory Studies--  CBC      Chemistries          Culture Data

## 2024-12-02 NOTE — PROGRESS NOTE ADULT - ASSESSMENT
75 yo F PMH T2DM on insulin, HTN, HLD, hypothyroidism, RA on Prednisone, Fibromyalgia, cerebral aneurysm s/p repair, chronic hypercapnic respiratory failure s/p trach (vent dependent) and chronic PEG 2022, recurrent C. diff infection (follows with Dr. Newman), persistent GJ tube leaks now s/p GC fistula repair 8/12 BIBEMS on 10/7 with daughter for respiratory distress, hypoxia to 88%.  Pt also noted to have rectal bleeding week prior to presentation requiring transfusion 5 days ago in ED as per daughter. Daughter also reports brownish discharge from G-J tube. In ED, pt afebrile, fiO2 96-98% on 40% on ventilator.  Chest X-ray w/ opacification of right lung concerning for possible PNA.  Admitted to RCU for further management. Sputum cxs grew PSA; treated with course of Cefepime. Patient with decreased H+H received 1 unit of PRBCS on 10/10.  10/14 EGD w/ Dr. Rosales for PEGJ exchange and clippings placed for closure of fistula site.  Persistent fevers treated with meropenem and vanc (d/c'd 10/20).  CT A/P without infectious source.  Continued fevers and persistent leukocytosis 11/1 CDiff positive - po vanco started, IV flagyl added (11/3-11/8). Head CT performed on 11/6 due to concern of lethargy no acute intracranial findings were noted; likely related to metabolic encephalopathy. Patient continued to have persistent fevers and was empirically treated with Cefepime (11/5-11/9) for CR PSA in sputum cx. CT C/A/P performed 11/6 c/w consolidative opacity LLL. Evening of 11/10-11/11, WBC 15 --> 21, procal 1 --> 43, lactate 3.2 -- pt also febrile. Infectious w/u sent 11/10 -- BCx negative, trach aspirate cx pending, UA negative. CT CAP on 11/11 showed left greater than right basilar consolidation and patchy ground glass opacity in both lungs consistent with atelectasis and/or pneumonia; Feeding tube coils in the stomach with tip within the first portion of the duodenum, No bowel obstruction. Cefepime restarted 11/11-11/15 with improvement in leukocytosis. Patient underwent Endoscopic adjustment of peg 11/13 with Dr. Rosales. XR tube check performed. Patient became Septic again on 11/19 requiring Midodrine, Solucortef and HC03 drip; Patient was empirically placed on Zerbaxa completed 11/25. Multiple GOC discussions held during this admission; family meeting held on 11/29 with Palliative care. Ultimately decision was made by family to pursue comfort measures while continuing mechanical ventilation.  They agree with discontinuing further phlebotomy, not initiating antibiotics, no more diagnostic work--ups and no pressors.      12/1: No events reported overnight. Patients family have decided to pursue comfort measures while continuing mechanical ventilation.  They agree with discontinuing further phlebotomy, not initiating antibiotics, no more diagnostic work--ups and no pressors. Goal is optimizing pain control. Patient received 1 PRN Dose of Dilaudid yesterday evening.  Will discuss with family today discontinuation of Blood Glucose monitoring and insulin administration.    75 yo F PMH T2DM on insulin, HTN, HLD, hypothyroidism, RA on Prednisone, Fibromyalgia, cerebral aneurysm s/p repair, chronic hypercapnic respiratory failure s/p trach (vent dependent) and chronic PEG 2022, recurrent C. diff infection (follows with Dr. Newman), persistent GJ tube leaks now s/p GC fistula repair 8/12 BIBEMS on 10/7 with daughter for respiratory distress, hypoxia to 88%.  Pt also noted to have rectal bleeding week prior to presentation requiring transfusion 5 days ago in ED as per daughter. Daughter also reports brownish discharge from G-J tube. In ED, pt afebrile, fiO2 96-98% on 40% on ventilator.  Chest X-ray w/ opacification of right lung concerning for possible PNA.  Admitted to RCU for further management. Sputum cxs grew PSA; treated with course of Cefepime. Patient with decreased H+H received 1 unit of PRBCS on 10/10.  10/14 EGD w/ Dr. Rosales for PEGJ exchange and clippings placed for closure of fistula site.  Persistent fevers treated with meropenem and vanc (d/c'd 10/20).  CT A/P without infectious source.  Continued fevers and persistent leukocytosis 11/1 CDiff positive - po vanco started, IV flagyl added (11/3-11/8). Head CT performed on 11/6 due to concern of lethargy no acute intracranial findings were noted; likely related to metabolic encephalopathy. Patient continued to have persistent fevers and was empirically treated with Cefepime (11/5-11/9) for CR PSA in sputum cx. CT C/A/P performed 11/6 c/w consolidative opacity LLL. Evening of 11/10-11/11, WBC 15 --> 21, procal 1 --> 43, lactate 3.2 -- pt also febrile. Infectious w/u sent 11/10 -- BCx negative, trach aspirate cx pending, UA negative. CT CAP on 11/11 showed left greater than right basilar consolidation and patchy ground glass opacity in both lungs consistent with atelectasis and/or pneumonia; Feeding tube coils in the stomach with tip within the first portion of the duodenum, No bowel obstruction. Cefepime restarted 11/11-11/15 with improvement in leukocytosis. Patient underwent Endoscopic adjustment of peg 11/13 with Dr. Rosales. XR tube check performed. Patient became Septic again on 11/19 requiring Midodrine, Solucortef and HC03 drip; Patient was empirically placed on Zerbaxa completed 11/25. Multiple GOC discussions held during this admission; family meeting held on 11/29 with Palliative care. Ultimately decision was made by family to pursue comfort measures while continuing mechanical ventilation.  They agree with discontinuing further phlebotomy, not initiating antibiotics, no more diagnostic work--ups and no pressors.      12/2:  Bleeding at old PEG site, improved throughout the day after dressing was changed.  No acute events throughout the day.  Daughter requesting cuff deflation to try to get mother to express her wishes.  Daughter would like herself and father at the bedside when we attempt communication.

## 2024-12-02 NOTE — PROGRESS NOTE ADULT - PROBLEM SELECTOR PLAN 1
pt remains trach to vent   defer to RCU for ongoing mangement pt remains trach to vent   defer to RCU for ongoing management

## 2024-12-02 NOTE — PROGRESS NOTE ADULT - PROBLEM SELECTOR PLAN 12
- Patients Daughter updated at bedside by RCU Team today  - MOLST Updated 11/19: Remains DNR / + Intubate, No Pressors / No transfer to ICU Setting   - Family Meeting held 11/29 by RCU Team and Palliative care   - Family has agreed to No further phlebotomy, not initiating antibiotics, no more diagnostic work-ups, no pressors.    - Continue Comfort measures

## 2024-12-02 NOTE — PROGRESS NOTE ADULT - PROBLEM SELECTOR PLAN 4
see go note above  DNR/intubate  pt's  is surrogate  family remains undecided on next steps: continue medical management vs comfort care/capping care continue ventilator vs compassionate withdrawal of care see goc note above  DNR/intubate  pt's  is surrogate-> spoke with surrogate today, confirmed plan for comfort care on vent   see GOC from 12/1 plan of care is to prioritize comfort while pt remains on ventilator and deescalate non-essential medications

## 2024-12-02 NOTE — PROGRESS NOTE ADULT - ASSESSMENT
Progress Note:   · Provider Specialty  Infectious Disease    Reason for Admission:   Reason for Admission:  · Reason for Admission  Patient admitted with Hypoxemia and episode of Bright red blood per rectum      · Subjective and Objective:   [facility]  Division of Infectious Diseases  [phone]    Name: MAXX LOPEZ  Age: 74y  Gender: Female  MRN: [id number]    Covering Dr. SHERLY Newman    Interval History--  Notes reviewed. Seen earlier today. Trached on vent, noninteractive.       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    Allergies    walnut (Unknown)  metronidazole (Rash)  Lyrica (Unknown)  penicillin (Unknown)  Pineapple (Unknown)  Tagamet (Unknown)  heparin (Unknown)  Pecans (Unknown)  Hazelnut (Unknown)  meropenem (Rash)    Intolerances        Medications--  Antibiotics:    Immunologic:  influenza  Vaccine (HIGH DOSE) 0.5 milliLiter(s) IntraMuscular once    Other:  acetaminophen   Oral Liquid .. PRN  albuterol/ipratropium for Nebulization  aluminum hydroxide/magnesium hydroxide/simethicone Suspension PRN  amLODIPine   Tablet  artificial  tears Solution  Biotene Dry Mouth Oral Rinse  chlorhexidine 0.12% Liquid  chlorhexidine 4% Liquid  diclofenac sodium 1% Gel  escitalopram  fentaNYL   Patch  12 MICROgram(s)/Hr  fentaNYL   Patch  25 MICROgram(s)/Hr  FIRST- Mouthwash  BLM  gabapentin Solution  hemorrhoidal Ointment  hydrALAZINE Injectable  insulin glargine Injectable (LANTUS)  insulin lispro (ADMELOG) corrective regimen sliding scale  lactobacillus acidophilus  levothyroxine  lidocaine   4% Patch  magnesium sulfate  IVPB  nystatin Powder  oxyCODONE    Solution PRN  predniSONE   Tablet  sodium chloride  triamcinolone 0.1% Oral Paste  witch hazel Pads      Review of Systems--  Review of systems unable secondary to clinical condition.     Physical Examination--  Vital Signs: T(F): 97.4 (11-28-24 @ 22:00), Max: 98.3 (11-28-24 @ 11:46)  HR: 74 (11-29-24 @ 09:18)  BP: 181/74 (11-29-24 @ 06:00)  RR: 20 (11-29-24 @ 06:00)  SpO2: 100% (11-29-24 @ 09:18)  Wt(kg): --  General: Nontoxic-appearing Female in no acute distress.  HEENT: AT/NC. Pupils/EOM unable secondary to patient compliance. Oropharynx/dentition unable secondary to patient compliance.   Neck: Not rigid. No sense of mass. Trach CDI  Nodes: None palpable.  Lungs: Dec BS B  Heart: Regular rate and rhythm.   Abdomen: Bowel sounds present and normoactive. Soft. Nondistended. Nontender. PEG site dressed.   Back: Unable  Extremities: No cyanosis or clubbing. 2+ edema.   Skin: Warm. Dry. Good turgor. PIV ok.   Psychiatric: Unable      Laboratory Studies--  CBC      Chemistries  11-28    146[H]  |  112[H]  |  65[H]  ----------------------------<  161[H]  4.1   |  17[L]  |  0.64    Ca    9.1      28 Nov 2024 07:28  Phos  4.2     11-28  Mg     1.7     11-28    Culture Data  No new data              Assessment and Plan:   · Assessment    73 yo woman PMH T2DM on insulin, HTN, HLD, hypothyroidism, RA on Prednisone, Fibromyalgia, cerebral aneurysm s/p repair, chronic hypercapnic respiratory failure s/p trach (vent dependent) and chronic PEG 2022, recurrent C. diff infection , persistent GJ tube leaks now s/p GC fistula repair 8/12  admitted 10/7/24 with resp distress and found to have RML opacity     Antibiotics  Ceftriaxone 10/7  Azithro 10/7  Cefepime 10/7--> 10/12  meropenem 10/15 --> 10/23  IV Vanco 10/17 --> 10/21  Vanco via NGT 10/24; 11/1-->  Metronidazole 11/3 --> 11/7  Cefepime 11/5 --> 11/10; 11/11--> 11/15  Erythromycin 11/11-->  Ceftolozane, tazobactam 11/18 --> 11/25      10/10 melena, anemia, blood tx  10/14 EGD for GJ exchange and piror PEG site closure  One benign-appearing, intrinsic moderate stenosis was found in the upper third of the        esophagus. The stenosis was traversed.       The cardia and gastric fundus were normal.       A gastric tube with tip in the jejunum was found in the gastric body. The PEG required        removal because it was leaking. The J tube extension (12F) was removed first. An externally        removable 24 Fr EndoVive Safety gastrostomy tube was lubricated. The guide wire was passed        through the existing G-tube port and snared endoscopically. The endoscope and snare were then        removed, pulling the wire out through the mouth. The g-tube was tied to the guidewire, pulled        through the mouth into the stomach and then pulled out from the stomach through the skin. The        bumper was attached to the gastrostomy tube. The feeding tube was then cut to an appropriate        length. The final position of the gastrostomy tube was confirmed by relook endoscopy, and        skin marking noted to be 3 cm at the external bumper. The final tension and compression of        the abdominal wall by the PEG tube and external bumper were checked and revealed that the        bumper was moderately tight and mildly deforming the skin. The J tube extension (12 F        EndoVive Jejunal feeding tube) was advanced into the stomach. The tip of the J tube had a        loop thread that was captured with a Resolution clip. The thread and the J tube extension        were advanced to the proximal jejunum, and the endoclip along with the tip of the J tube was        attached to the wall securely. The J tube extension was capped, and the tube site was cleaned        and dressed.       A small fistula was found on the anterior wall of the gastric body related to prior G tube        site. Coagulation of tissue near the fistula using argon plasma at 1.2 liters/minute and 35        peraza was successful. To closure the gastrocutaneous fistula, four hemostatic clips were        successfully placed (MR conditional, two 16 mm DuraClip and 2 Mantis clips.       The examined duodenum was normal.    10/14, 10/15 Fever, transient hypoxia post procedure  10/15 ProCalcitonin = 8.48 suggestive of bacterial process; BCx x2 NGTD; Trach: Pseudomonas   - though benign mg stain  10;16 Leukocytosis WBC = 14.26  10/17 fever, hypotension, abd distension, no diarrhea noted this am WBC = 15.02; Rising Procalcitonin = 38.49; Obstructed J tube   10/18  lost IV access re-gained  - CT scan late this afternoon  WBC = 8.68; Rising Procalcitonin >100  10/18 imaging suggests multifocal pneumonia  10/21 improved chest exam, Procalcitonin level considerably decreased, decreased FiO2  - afebrile since 10/18  10/21 UGI endoscopy done  Findings:       The esophagus was normal. A fistula was found on the anterior wall of the gastric body.       There was evidence of a gastrostomy present in the gastric body. The patient was placed in        the supine position. The endoscope was advanced to the jejunum and the prior placed J tube        with loop attached to the wall and the loop thread was cut by endoclip. The J tube and the G        tube were removed. The gastrostomy fistula was utilized and an Avanos 22 F        Gastrostomy/Jejunal tube was advanced. The jejunal tip was advanced through the pylorus and        into the duodenum. The endoscope was then advanced to the duodenum and the loop thread at the        tip of the J tube was captured and advanced to the proximal jejunum. The loop thread was        clipped to wall by a [employer] Resolution clip. The J tube flushed easily and the G        tube decompress the abdomen easily. The internal balloon was insufflated with 10 cc of water        to hold the GJ tube in place. The outside marking was at 3.  10/23  no distress, liquid stools noted  - Meropenem discontinued  GI PCR NEG  10/24  persistent diarrhea  Cdiff NEG; enteral vanco stopped and Immodium provided  10/28 low grade temps, mild leukocytosis  10/30 blood transfusion  11/1  fever  +Cdiff  11/3 continued fever  - +BC Stph epi most likely procurement contaminant,  modest diarrhea reported  - daughter described increased diarrhea in evenings - Pseudomonas airway colonization is long term, no suggestion of pneumonia at present, sacral decub remains superifical will granulated without signs of infection  11/4  - fever, abnormal chest exam though FIO2 still 30%  rectal tube inserted for increased diarrhea  11/5 lethargy, fever, leukocytosis, increased lactate, hyponatremia  11/6 sputum gram stain suggests persistent Pseudomonas colonization  - continued fever, leukocytosis  11/7 unclear if LLL consolidation represents infection. Repeat Procalcitonin  11/7= 2.92 decreased  11/8 continued fevers  +diarrhea, lethargy continues  to complete Cefepime for LLL pneumonia v atelectasis tomorrow 11/9 11/11 increased fever with increased WBC and increased Procalcitonin off Cefepime noted, which was resumed  Concern if feed is refluxing back to the stomach versus J tube now in the stomach.     imaging show G tube in 1st part of duodenum. there are bibasilar L>R consolidations - likely continual reaspiration  11/12 persistent leukocytosis  11/13, decreased height of fever, decreased leukocytosis, Procalcitonin decreased to 52% of the 11/11 value suggesting good therapeutic response. s/p endoscopic replacement of feeding tube into jejunum  - Rectal tube came out  11/15 sleepy  - leukocytosis almost completely resolved    11/18 disturbing decompensation with clinical toxicity, unresponsive, increased leukocytosis, lactic acidosis   extensive antibiotic exposure with chronic Pseudomonas colonization of upper airway makes antibiotic resistance likely  Dental ASSESSMENT appreciated : Bedside Exam: No evidence of swelling, abscess or fistula. However, odontogenic infection cannot be fully ruled out without dental radiographs. No aspiration [of tooth] rk at this time.    11/19 increased leukocytosis, persistent lactic acidosis,   decreased ProCalcitonin  11/21  blood work improved  - I discussed with daughter who is considering comfort care at home after current antibiotic course is completed  11/22 reponsive now, clinically improved  11.25 improved, less toxic, with decreased leukocytosis and gratifying serial decline in ProCalcitonin value, though remain profoundly functionally impaired, generally somnolent and vulnerable  11/26 no immedate decompensation off antibiotics  11/29: Essentially unchanged off antibiotics. I do not see a role for antimicrobials at this time. Maxillofacial CT report reviewed- I do not thing that the risk:benefit of antibiotics treating periapical pathology is favorable at this point in time. I suspect the mastoid findings are related to recumbency and do not represent acute mastoiditis needing treatment. Would maintain a very high threshold for empiric antibiotics in the setting of recurrent C. difficile. Mental status poor.   12/2: For comfort care only.        Issues--  recurrent Cdiff  cerebral aneurysm s/p repair  chronic hypercapnic respiratory failure s/p trach (vent dependent) and chronic PEG 2022  anemia  - had iron deficiency  Pseudomonas aeruginosa upper airway colonization  (most recent isolate Resist to Cipro/Levo)  T2DM on insulin  10.14.24 -A1C: 5.8%  HTN  HLD  hypothyroidism  RA on Prednisone  Fibromyalgia  debility  sacral ulcer largely healed  malnourished/chronic catabolic state  aspiration    Suggestions--  Defer additional antibiotics  D/W RCU team.    I'll sign off at this time.   Thank you for the courtesy of this referral.    Adriano Saravia MD  Attending Physician  [facility]  Division of Infectous Diseases  [phone]

## 2024-12-02 NOTE — PROGRESS NOTE ADULT - PROBLEM SELECTOR PLAN 5
will continue to follow for goc  if goals transition to comfort measures only with ventilator can consider the following symptom regimen  c/w fentanyl patch  can transition PO to IV and deescalate further work up and non-symptom management medications   Recommend:  Dilaudid 0.5 mg IV q2h prn dyspnea or tachypnea  Dilaudid 0.5 mg IV q2h prn severe pain and Dilaudid 0.2 mg IV q2h prn moderate pain  Ativan 0.5mg IV q2h prn agitation   bowel regimen while on opioids   Acetaminophen po 650 mg q6h prn fever  Zofran 4 mg IV q8h prn nausea or vomiting   if plan is for compassionate extubation please page PCU for symptom recs and ensure live on sign off   Can be reached by TEAMS M-F 9-5 Loni Amador Any other time please page [phone] if needed will sign off as goals established, and symptom regimen established   goals are for comfort measures only with ventilator, continue the following symptom regimen  c/w fentanyl patch  for comfort measures only recommend:   Dilaudid 0.5 mg IV q2h prn dyspnea or tachypnea  Dilaudid 0.5 mg IV q2h prn severe pain and Dilaudid 0.2 mg IV q2h prn moderate pain  Ativan 0.5mg IV q2h prn agitation   bowel regimen while on opioids   Acetaminophen po 650 mg q6h prn fever  Zofran 4 mg IV q8h prn nausea or vomiting   if plan is for compassionate extubation or further symptom management needed please page or reconsult palliative team   Can be reached by TEAMS M-F 9-5 Loni Amador Any other time please page [phone] if needed

## 2024-12-03 NOTE — PROGRESS NOTE ADULT - PROBLEM SELECTOR PLAN 4
- Amolodipine and hydralazine previously dcd in setting of hypotension   - Hydralazine IVP Resumed 11/22  - Norvasc resumed 11/27   - Continue to monitor BP - Amolodipine and hydralazine previously dcd in setting of hypotension   - Hydralazine IVP Resumed 11/22 - d/c'd 12/3 as per family wishes for comfort measures  - Norvasc resumed 11/27 - d/c'd 12/3 as per family wishes for comfort measures  - Continue to monitor BP

## 2024-12-03 NOTE — PROGRESS NOTE ADULT - PROBLEM SELECTOR PLAN 11
- Patients family have decided to pursue comfort measures   - Families Goal is optimizing pain control  - Will continue Gabapentin and Fentanyl Patches ( Renew 12/3)  - Continue Dilaudid 0.5 mg IV q2h prn dyspnea or tachypnea  - Dilaudid 0.5 mg IV q2h prn severe pain and Dilaudid 0.2 mg IV q2h prn moderate pain  - Ativan 0.5mg IV q2h prn agitation - Patients family have decided to pursue comfort measures   - Families Goal is optimizing pain control  - Will continue Gabapentin and Fentanyl Patches (Renew 12/3)  - Continue Dilaudid 0.5 mg IV q2h prn dyspnea or tachypnea  - Dilaudid 0.5 mg IV q2h prn severe pain and Dilaudid 0.2 mg IV q2h prn moderate pain  - Ativan 0.5mg IV q2h prn agitation  - 12/3 added ATC hydromorphone as requested by family to aide in comfort measures

## 2024-12-03 NOTE — PROGRESS NOTE ADULT - PROBLEM SELECTOR PLAN 1
Inpatient Plan:  - Patient now receiving palliative care services. Family meeting will be held on 11/29/24   Please monitor blood glucose values q 6 hours while on tube feeding or NPO  - C/w Lantus 10u QAM  - C/w low dose Admelog correctional scale q6h while NPO/TFs  - Please inform Endocrine team for any changes in tube feeding,  steroid  - Please keep all IV abx in NS solution if possible!    Discharge Plan:  - TBD depending on final decisions of family and on  insulin requirement if discharged on tube feeding regimen (Bolus vs continuous tube feeding)   - If bolus tube feeding > Lantus plus Humalog   - Patient should have BGs checked 4x a day while on TFs.    Daughter aware to contact Endocrinologist if BG <70mg/dL x1 or >200mg/dL consistently or >400mg/dL x1.   - Followup with Dr Ruth: [facility]: [address] [place] [address]. [address]. [place] [address]. Tel: [phone]- Telemedicine- daughter to schedule.   - Make sure pt has Rx for all DM supplies and insulin

## 2024-12-03 NOTE — PROGRESS NOTE ADULT - NS ATTEND AMEND GEN_ALL_CORE FT
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 Found to have Pseudomonas aeruginosa in sputum culture and urine culture with ESBL E. coli s/p course of meropenem. Course complicated by antibiotic-associated diarrhea.      #Neuro: improved mentation over the past few days. complains of pain.   #CV: prior septic shock on midodrine; now hypertensive; c/w amlodipine and hydralazine  #Pulm: remains on full support on the vent. c/w airway clearance therapy with chest PT q 12 hours and duonebs q6 hours  #ID: completed treatment on 11/25 for  PsA PNA and recurrent C diff with zerbaxa and oral vanco respectively  #Renal/Fluid: monitor I/O; s/p IV diuresis for anasarca and weeping skin. Diuresis prn for comfort.  #Heme: anemia from hemorrhoids, AOCD. s/p EGD without active bleeding, transfuse for Hb < 7  #Endo: transitioned to home prednisone; insulin management per endo  #GI: tolerating tube feeds; diarrhea improved  # Skin/Lines: sacral decub- wound care  #DVT ppx: SCDs  #Dispo/Ethics:  Pt DNR/DNI.   [facility] meeting 11/29 as documented in note. Family Meeting held 11/29 by RCU Team and Palliative care.   Family agreeable to no further phlebotomy, initiation of antibiotics or IVF. No further diagnostic testing or aggressive measures. Dilaudid prn and anxiolytics. Family at bedside, questions answered and support provided. 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 Found to have Pseudomonas aeruginosa in sputum culture and urine culture with ESBL E. coli s/p course of meropenem. Course complicated by antibiotic-associated diarrhea,  encephalopathy.     Clinically minimal improvement.   Intermittently wakes up, complains of pain.   Remains on full mechanical vent support.   Family has decided to pursue comfort measures- will stop FS, majority of medications, c/w Fentanyl patch and dilaudid prn and anxiolytics, other meds for symptoms  Appreciate Palliative care follow up and recs   Dispo - DNR

## 2024-12-03 NOTE — PROGRESS NOTE ADULT - PROBLEM SELECTOR PLAN 6
- Patient on Lantus and Humalog at home    - Standing Ademalog Dcd   - Currently remains Lantus and ISS  - Will d/w family discontinuation of BGM and Insulin administration - Patient on Lantus and Humalog at home    - Standing Ademalog D/c'd   - s/p Lantus and ISS  - d/c'd 12/3 as per family wishes for comfort measures

## 2024-12-03 NOTE — PROGRESS NOTE ADULT - SUBJECTIVE AND OBJECTIVE BOX
Patient is a 74y old  Female who presents with a chief complaint of Patient admitted with Hypoxemia and episode of Bright red blood per rectum (02 Dec 2024 12:50)      Interval Events:    REVIEW OF SYSTEMS:  [ ] Positive  [ ] All other systems negative  [ ] Unable to assess ROS because ________    Vital Signs Last 24 Hrs  T(C): 36.5 (12-03-24 @ 05:00), Max: 37.2 (12-02-24 @ 22:00)  T(F): 97.7 (12-03-24 @ 05:00), Max: 99 (12-02-24 @ 22:00)  HR: 82 (12-03-24 @ 05:00) (77 - 99)  BP: 139/57 (12-03-24 @ 05:00) (118/54 - 139/57)  RR: 18 (12-03-24 @ 05:00) (18 - 20)  SpO2: 96% (12-03-24 @ 05:00) (96% - 100%)    PHYSICAL EXAM:  HEENT:   [ ]Tracheostomy:  [ ]Pupils equal  [ ]No oral lesions  [ ]Abnormal    SKIN  [ ]No Rash  [ ] Abnormal  [ ] pressure    CARDIAC  [ ]Regular  [ ]Abnormal    PULMONARY  [ ]Bilateral Clear Breath Sounds  [ ]Normal Excursion  [ ]Abnormal    GI  [ ]PEG      [ ] +BS		              [ ]Soft, nondistended, nontender	  [ ]Abnormal    MUSCULOSKELETAL                                   [ ]Bedbound                 [ ]Abnormal    [ ]Ambulatory/OOB to chair                           EXTREMITIES                                         [ ]Normal  [ ]Edema                           NEUROLOGIC  [ ] Normal, non focal  [ ] Focal findings:    PSYCHIATRIC  [ ]Alert and appropriate  [ ] Sedated	 [ ]Agitated    :  Mcbride: [ ] Yes, if yes: Date of Placement:                   [  ] No    LINES: Central Lines [ ] Yes, if yes: Date of Placement                                     [  ] No    HOSPITAL MEDICATIONS:  MEDICATIONS  (STANDING):  acetaminophen   Oral Liquid .. 1000 milliGRAM(s) Oral every 8 hours  albuterol/ipratropium for Nebulization 3 milliLiter(s) Nebulizer every 6 hours  amLODIPine   Tablet 10 milliGRAM(s) Oral daily  artificial  tears Solution 1 Drop(s) Both EYES every 6 hours  Biotene Dry Mouth Oral Rinse 5 milliLiter(s) Swish and Spit every 6 hours  chlorhexidine 0.12% Liquid 15 milliLiter(s) Oral Mucosa every 12 hours  chlorhexidine 4% Liquid 1 Application(s) Topical daily  diclofenac sodium 1% Gel 2 Gram(s) Topical every 6 hours  escitalopram 10 milliGRAM(s) Oral <User Schedule>  FIRST- Mouthwash  BLM 5 milliLiter(s) Swish and Spit every 8 hours  gabapentin Solution 250 milliGRAM(s) Oral <User Schedule>  hemorrhoidal Ointment 1 Application(s) Rectal at bedtime  hydrALAZINE Injectable 10 milliGRAM(s) IV Push every 8 hours  influenza  Vaccine (HIGH DOSE) 0.5 milliLiter(s) IntraMuscular once  insulin glargine Injectable (LANTUS) 10 Unit(s) SubCutaneous <User Schedule>  insulin lispro (ADMELOG) corrective regimen sliding scale   SubCutaneous every 6 hours  lactobacillus acidophilus 1 Tablet(s) Oral <User Schedule>  lidocaine   4% Patch 4 Patch Transdermal every 24 hours  nystatin Powder 1 Application(s) Topical every 8 hours  predniSONE   Tablet 5 milliGRAM(s) Oral daily  triamcinolone 0.1% Oral Paste 1 Application(s) Topical every 8 hours  witch hazel Pads 1 Application(s) Topical every 12 hours    MEDICATIONS  (PRN):  aluminum hydroxide/magnesium hydroxide/simethicone Suspension 30 milliLiter(s) Enteral Tube every 4 hours PRN Dyspepsia  HYDROmorphone  Injectable 0.5 milliGRAM(s) IV Push every 2 hours PRN Severe Pain (7 - 10)  HYDROmorphone  Injectable 0.2 milliGRAM(s) IV Push every 2 hours PRN Moderate Pain (4 - 6)  HYDROmorphone  Injectable 0.5 milliGRAM(s) IV Push every 2 hours PRN Dyspnea or Tachypnea  LORazepam   Injectable 0.5 milliGRAM(s) IV Push every 2 hours PRN Agitation      LABS:                  CAPILLARY BLOOD GLUCOSE    MICROBIOLOGY:     RADIOLOGY:  [ ] Reviewed and interpreted by me    Mode: AC/ CMV (Assist Control/ Continuous Mandatory Ventilation)  RR (machine): 20  TV (machine): 400  FiO2: 30  PEEP: 5  ITime: 1  MAP: 14  PIP: 30   Patient is a 74y old  Female who presents with a chief complaint of Patient admitted with Hypoxemia and episode of Bright red blood per rectum (02 Dec 2024 12:50)    Interval Events:  No acute events overnight.     REVIEW OF SYSTEMS:  [ ] Positive  [ ] All other systems negative  [X] Unable to assess ROS because ____limited capacity to communicate____    Vital Signs Last 24 Hrs  T(C): 36.5 (12-03-24 @ 05:00), Max: 37.2 (12-02-24 @ 22:00)  T(F): 97.7 (12-03-24 @ 05:00), Max: 99 (12-02-24 @ 22:00)  HR: 82 (12-03-24 @ 05:00) (77 - 99)  BP: 139/57 (12-03-24 @ 05:00) (118/54 - 139/57)  RR: 18 (12-03-24 @ 05:00) (18 - 20)  SpO2: 96% (12-03-24 @ 05:00) (96% - 100%)    PHYSICAL EXAM:  HEENT:   [X]Tracheostomy: #8 XLT shiley  [X]Pupils equal  [ ]No oral lesions  [ ]Abnormal    SKIN  [ ]No Rash  [ ] Abnormal  [X] pressure - sacral unstageable     CARDIAC  [X]Regular  [ ]Abnormal    PULMONARY  [ ]Bilateral Clear Breath Sounds  [ ]Normal Excursion  [X]Abnormal - BL coarse BS    GI  [X]PEG      [X] +BS		              [X]Soft, distended, nontender	  [ ]Abnormal    MUSCULOSKELETAL                                   [X]Bedbound                 [ ]Abnormal    [ ]Ambulatory/OOB to chair                           EXTREMITIES                                         [ ]Normal  [X]Edema - generalized edema            NEUROLOGIC  [ ] Normal, non focal  [X] Focal findings: awake and alert, did not follow commands     PSYCHIATRIC  [X]Alert and appropriate  [ ] Sedated	 [ ]Agitated    :  Mcbride: [ ] Yes, if yes: Date of Placement:                   [X] No    LINES: Central Lines [ ] Yes, if yes: Date of Placement                                     [X] No    HOSPITAL MEDICATIONS:  MEDICATIONS  (STANDING):  acetaminophen   Oral Liquid .. 1000 milliGRAM(s) Oral every 8 hours  albuterol/ipratropium for Nebulization 3 milliLiter(s) Nebulizer every 6 hours  amLODIPine   Tablet 10 milliGRAM(s) Oral daily  artificial  tears Solution 1 Drop(s) Both EYES every 6 hours  Biotene Dry Mouth Oral Rinse 5 milliLiter(s) Swish and Spit every 6 hours  chlorhexidine 0.12% Liquid 15 milliLiter(s) Oral Mucosa every 12 hours  chlorhexidine 4% Liquid 1 Application(s) Topical daily  diclofenac sodium 1% Gel 2 Gram(s) Topical every 6 hours  escitalopram 10 milliGRAM(s) Oral <User Schedule>  FIRST- Mouthwash  BLM 5 milliLiter(s) Swish and Spit every 8 hours  gabapentin Solution 250 milliGRAM(s) Oral <User Schedule>  hemorrhoidal Ointment 1 Application(s) Rectal at bedtime  hydrALAZINE Injectable 10 milliGRAM(s) IV Push every 8 hours  influenza  Vaccine (HIGH DOSE) 0.5 milliLiter(s) IntraMuscular once  insulin glargine Injectable (LANTUS) 10 Unit(s) SubCutaneous <User Schedule>  insulin lispro (ADMELOG) corrective regimen sliding scale   SubCutaneous every 6 hours  lactobacillus acidophilus 1 Tablet(s) Oral <User Schedule>  lidocaine   4% Patch 4 Patch Transdermal every 24 hours  nystatin Powder 1 Application(s) Topical every 8 hours  predniSONE   Tablet 5 milliGRAM(s) Oral daily  triamcinolone 0.1% Oral Paste 1 Application(s) Topical every 8 hours  witch hazel Pads 1 Application(s) Topical every 12 hours    MEDICATIONS  (PRN):  aluminum hydroxide/magnesium hydroxide/simethicone Suspension 30 milliLiter(s) Enteral Tube every 4 hours PRN Dyspepsia  HYDROmorphone  Injectable 0.5 milliGRAM(s) IV Push every 2 hours PRN Severe Pain (7 - 10)  HYDROmorphone  Injectable 0.2 milliGRAM(s) IV Push every 2 hours PRN Moderate Pain (4 - 6)  HYDROmorphone  Injectable 0.5 milliGRAM(s) IV Push every 2 hours PRN Dyspnea or Tachypnea  LORazepam   Injectable 0.5 milliGRAM(s) IV Push every 2 hours PRN Agitation    LABS:    CAPILLARY BLOOD GLUCOSE    MICROBIOLOGY:     RADIOLOGY:  [ ] Reviewed and interpreted by me    Mode: AC/ CMV (Assist Control/ Continuous Mandatory Ventilation)  RR (machine): 20  TV (machine): 400  FiO2: 30  PEEP: 5  ITime: 1  MAP: 14  PIP: 30

## 2024-12-03 NOTE — PROGRESS NOTE ADULT - SUBJECTIVE AND OBJECTIVE BOX
Patient seen today for follow up inpatient Diabetes Mellitus management.    Chief Complaint: Type 2 Diabetes Mellitus     INTERVAL HX:  Patient seen in [facility] [id number] W1. Patient is alert and tracking with eyes, resting in bed, remains trached on vent. Patient continues on 24hr TFs [place] pep @40cc/hr, tolerating well, at goal. FBG stable this am to 136mg/dL. No hypoglycemia. BG have been stable and mostly at goal 100-180mg/dL while on continuos TFs. No changes to TF rate or formula noted. Blood glucose levels in the last 24hrs have been 136-183mg/dL.     Review of Systems:  General: As above.  Respiratory: FARAZ mental status wax/wean, trached on vent.   Gastrointestinal: FARAZ mental status wax/wean, trached on vent.   Endocrine: FARAZ mental status wax/wean, trached on vent.     Allergies  walnut (Unknown)  metronidazole (Rash)  Lyrica (Unknown)  penicillin (Unknown)  Pineapple (Unknown)  Tagamet (Unknown)  heparin (Unknown)  Pecans (Unknown)  Hazelnut (Unknown)  meropenem (Rash)      Intolerances  None.       MEDICATIONS  (STANDING):  acetaminophen   Oral Liquid .. 1000 milliGRAM(s) Oral every 8 hours  albuterol/ipratropium for Nebulization 3 milliLiter(s) Nebulizer every 6 hours  aluminum hydroxide/magnesium hydroxide/simethicone Suspension 30 milliLiter(s) Enteral Tube every 4 hours PRN  amLODIPine   Tablet 10 milliGRAM(s) Oral daily  artificial  tears Solution 1 Drop(s) Both EYES every 6 hours  Biotene Dry Mouth Oral Rinse 5 milliLiter(s) Swish and Spit every 6 hours  chlorhexidine 0.12% Liquid 15 milliLiter(s) Oral Mucosa every 12 hours  chlorhexidine 4% Liquid 1 Application(s) Topical daily  diclofenac sodium 1% Gel 2 Gram(s) Topical every 6 hours  escitalopram 10 milliGRAM(s) Oral <User Schedule>  FIRST- Mouthwash  BLM 5 milliLiter(s) Swish and Spit every 8 hours  gabapentin Solution 250 milliGRAM(s) Oral <User Schedule>  hemorrhoidal Ointment 1 Application(s) Rectal at bedtime  hydrALAZINE Injectable 10 milliGRAM(s) IV Push every 8 hours  HYDROmorphone  Injectable 0.5 milliGRAM(s) IV Push every 2 hours PRN  HYDROmorphone  Injectable 0.2 milliGRAM(s) IV Push every 2 hours PRN  HYDROmorphone  Injectable 0.5 milliGRAM(s) IV Push every 2 hours PRN  influenza  Vaccine (HIGH DOSE) 0.5 milliLiter(s) IntraMuscular once  insulin glargine Injectable (LANTUS) 10 Unit(s) SubCutaneous <User Schedule>  insulin lispro (ADMELOG) corrective regimen sliding scale   SubCutaneous every 6 hours  lactobacillus acidophilus 1 Tablet(s) Oral <User Schedule>  lidocaine   4% Patch 4 Patch Transdermal every 24 hours  LORazepam   Injectable 0.5 milliGRAM(s) IV Push every 2 hours PRN  nystatin Powder 1 Application(s) Topical every 8 hours  predniSONE   Tablet 5 milliGRAM(s) Oral daily  triamcinolone 0.1% Oral Paste 1 Application(s) Topical every 8 hours  witch hazel Pads 1 Application(s) Topical every 12 hours      insulin glargine Injectable (LANTUS) 10 Unit(s) SubCutaneous <User Schedule>  insulin lispro (ADMELOG) corrective regimen sliding scale   SubCutaneous every 6 hours  predniSONE   Tablet 5 milliGRAM(s) Oral daily      insulin lispro (ADMELOG) corrective regimen sliding scale   SubCutaneous every 6 hours      PHYSICAL EXAM:  VITALS:   T(C): 36.5 (12-03-24 @ 11:40), Max: 37.2 (12-02-24 @ 22:00)  HR: 80 (12-03-24 @ 11:40) (71 - 99)  BP: 126/69 (12-03-24 @ 11:40) (118/54 - 139/57)  RR: 18 (12-03-24 @ 11:40) (18 - 20)  SpO2: 100% (12-03-24 @ 11:40) (96% - 100%)    GENERAL: In no acute distress  Respiratory: Respirations unlabored, trached on ventilator   Extremities: Warm and dry, no edema  NEURO: Alert and tracking with eyes, appropriate     LABS:  POCT Blood Glucose.: 183 mg/dL (12-03-24 @ 11:31)  POCT Blood Glucose.: 136 mg/dL (12-03-24 @ 05:10)  POCT Blood Glucose.: 179 mg/dL (12-02-24 @ 23:41)  POCT Blood Glucose.: 162 mg/dL (12-02-24 @ 18:14)  POCT Blood Glucose.: 171 mg/dL (12-02-24 @ 11:29)  POCT Blood Glucose.: 128 mg/dL (12-02-24 @ 06:08)  POCT Blood Glucose.: 137 mg/dL (12-02-24 @ 00:26)  POCT Blood Glucose.: 164 mg/dL (12-01-24 @ 18:22)  POCT Blood Glucose.: 206 mg/dL (12-01-24 @ 12:08)  POCT Blood Glucose.: 140 mg/dL (12-01-24 @ 05:15)  POCT Blood Glucose.: 192 mg/dL (11-30-24 @ 23:38)  POCT Blood Glucose.: 194 mg/dL (11-30-24 @ 17:19)      A1C with Estimated Average Glucose Result: A1C with Estimated Average Glucose Result: 5.8 % (10-14-24 @ 05:08)  A1C with Estimated Average Glucose Result: 6.4 % (08-12-24 @ 07:32)

## 2024-12-03 NOTE — PROGRESS NOTE ADULT - ASSESSMENT
73 yo F w/h/o controlled T2DM (A1C 5.8%) on insulin at home. Also HTN, HLD, hypothyroidism, RA on Prednisone, Fibromyalgia, cerebral aneurysm s/p repair, chronic hypercapnic respiratory failure s/p trach (vent dependent) and chronic PEG 2022, recurrent C. diff infection (follows with Dr. 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 S/p antibiotics and s/p GJ tube exchanges on 10/14, s/p PEG replacement 10/21. Endocrine consulted for Type 2 DM management while on tube feeding. Patient is doing better, remains trached on vent but improved mental status. Ongoing GOC discussions with family noted. No changes to TF rate or formula, continues on 24hr TFs at goal, tolerating. FBG stable, no hypoglycemia. BGs mostly at goal 100-180mg/dL, will keep insulin regimen as is for now and monitor closely for any changes needed.     #Type 2 diabetes mellitus   A1C with Estimated Average Glucose Result: 5.8 % (10-14-24 @ 05:08)    Home DM medications: Lantus 35 units at HS, Humalog 26 units with # 1 feeding, 22 units with #2 tube feeding, 20 units with #3 tube feeding and  Humalog correction scale (  2 units for -081, 4 units for -250, 6 units for -300, 8units for -350, 10 units for -400, 12 units for -450)   while on [employer] formula 3 cans/day, slow bolus( run at 80 ml/hr total volume 960 ml/day> 1st can around 6:30-8:30, 2nd can around 3 pm, 3rd can around 8-9 pm) plus Banatrol 1/2 packet BID, was increasing to 1 packet BID, plus Kefir 10 oz/day ( divided in multiple times throughout the day).

## 2024-12-03 NOTE — PROGRESS NOTE ADULT - NUTRITIONAL ASSESSMENT
Diet, NPO with Tube Feed:   Tube Feeding Modality: Jejunostomy  [place]ie [employer] Peptide 1.5 (KFPEPT1.5RTH)  Total Volume for 24 Hours (mL): 960  Continuous  Until Goal Tube Feed Rate (mL per Hour): 40  Tube Feed Duration (in Hours): 24  Tube Feed Start Time: 06:00  Free Water Flush  Pump   Rate (mL per Hour): 50   Frequency: Every Hour  Free Water Flush Instructions:  50 ml free water flushes Q1hr  Alfred(7 Gm Arginine/7 Gm Glut/1.2 Gm HMB     Qty per Day:  2  No Carb Prosource (1pkg = 15gms Protein)     Qty per Day:  1  Banatrol TF     Qty per Day:  1/2 packet per daily (11-30-24 @ 10:54) [Active]

## 2024-12-03 NOTE — PROGRESS NOTE ADULT - ASSESSMENT
73 yo F PMH T2DM on insulin, HTN, HLD, hypothyroidism, RA on Prednisone, Fibromyalgia, cerebral aneurysm s/p repair, chronic hypercapnic respiratory failure s/p trach (vent dependent) and chronic PEG 2022, recurrent C. diff infection (follows with Dr. Newman), persistent GJ tube leaks now s/p GC fistula repair 8/12 BIBEMS on 10/7 with daughter for respiratory distress, hypoxia to 88%.  Pt also noted to have rectal bleeding week prior to presentation requiring transfusion 5 days ago in ED as per daughter. Daughter also reports brownish discharge from G-J tube. In ED, pt afebrile, fiO2 96-98% on 40% on ventilator.  Chest X-ray w/ opacification of right lung concerning for possible PNA.  Admitted to RCU for further management. Sputum cxs grew PSA; treated with course of Cefepime. Patient with decreased H+H received 1 unit of PRBCS on 10/10.  10/14 EGD w/ Dr. Rosales for PEGJ exchange and clippings placed for closure of fistula site.  Persistent fevers treated with meropenem and vanc (d/c'd 10/20).  CT A/P without infectious source.  Continued fevers and persistent leukocytosis 11/1 CDiff positive - po vanco started, IV flagyl added (11/3-11/8). Head CT performed on 11/6 due to concern of lethargy no acute intracranial findings were noted; likely related to metabolic encephalopathy. Patient continued to have persistent fevers and was empirically treated with Cefepime (11/5-11/9) for CR PSA in sputum cx. CT C/A/P performed 11/6 c/w consolidative opacity LLL. Evening of 11/10-11/11, WBC 15 --> 21, procal 1 --> 43, lactate 3.2 -- pt also febrile. Infectious w/u sent 11/10 -- BCx negative, trach aspirate cx pending, UA negative. CT CAP on 11/11 showed left greater than right basilar consolidation and patchy ground glass opacity in both lungs consistent with atelectasis and/or pneumonia; Feeding tube coils in the stomach with tip within the first portion of the duodenum, No bowel obstruction. Cefepime restarted 11/11-11/15 with improvement in leukocytosis. Patient underwent Endoscopic adjustment of peg 11/13 with Dr. Rosales. XR tube check performed. Patient became Septic again on 11/19 requiring Midodrine, Solucortef and HC03 drip; Patient was empirically placed on Zerbaxa completed 11/25. Multiple GOC discussions held during this admission; family meeting held on 11/29 with Palliative care. Ultimately decision was made by family to pursue comfort measures while continuing mechanical ventilation.  They agree with discontinuing further phlebotomy, not initiating antibiotics, no more diagnostic work--ups and no pressors.      12/2:  Bleeding at old PEG site, improved throughout the day after dressing was changed.  No acute events throughout the day.  Daughter requesting cuff deflation to try to get mother to express her wishes.  Daughter would like herself and father at the bedside when we attempt communication.   75 yo F PMH T2DM on insulin, HTN, HLD, hypothyroidism, RA on Prednisone, Fibromyalgia, cerebral aneurysm s/p repair, chronic hypercapnic respiratory failure s/p trach (vent dependent) and chronic PEG 2022, recurrent C. diff infection (follows with Dr. Newman), persistent GJ tube leaks now s/p GC fistula repair 8/12 BIBEMS on 10/7 with daughter for respiratory distress, hypoxia to 88%.  Pt also noted to have rectal bleeding week prior to presentation requiring transfusion 5 days ago in ED as per daughter. Daughter also reports brownish discharge from G-J tube. In ED, pt afebrile, fiO2 96-98% on 40% on ventilator.  Chest X-ray w/ opacification of right lung concerning for possible PNA.  Admitted to RCU for further management. Sputum cxs grew PSA; treated with course of Cefepime. Patient with decreased H+H received 1 unit of PRBCS on 10/10.  10/14 EGD w/ Dr. Rosales for PEGJ exchange and clippings placed for closure of fistula site.  Persistent fevers treated with meropenem and vanc (d/c'd 10/20).  CT A/P without infectious source.  Continued fevers and persistent leukocytosis 11/1 CDiff positive - po vanco started, IV flagyl added (11/3-11/8). Head CT performed on 11/6 due to concern of lethargy no acute intracranial findings were noted; likely related to metabolic encephalopathy. Patient continued to have persistent fevers and was empirically treated with Cefepime (11/5-11/9) for CR PSA in sputum cx. CT C/A/P performed 11/6 c/w consolidative opacity LLL. Evening of 11/10-11/11, WBC 15 --> 21, procal 1 --> 43, lactate 3.2 -- pt also febrile. Infectious w/u sent 11/10 -- BCx negative, trach aspirate cx pending, UA negative. CT CAP on 11/11 showed left greater than right basilar consolidation and patchy ground glass opacity in both lungs consistent with atelectasis and/or pneumonia; Feeding tube coils in the stomach with tip within the first portion of the duodenum, No bowel obstruction. Cefepime restarted 11/11-11/15 with improvement in leukocytosis. Patient underwent Endoscopic adjustment of peg 11/13 with Dr. Rosales. XR tube check performed. Patient became Septic again on 11/19 requiring Midodrine, Solucortef and HC03 drip; Patient was empirically placed on Zerbaxa completed 11/25. Multiple GOC discussions held during this admission; family meeting held on 11/29 with Palliative care. Ultimately decision was made by family to pursue comfort measures while continuing mechanical ventilation.  They agree with discontinuing further phlebotomy, not initiating antibiotics, no more diagnostic work--ups and no pressors.      12/2:  Bleeding at old PEG site, improved throughout the day after dressing was changed.  No acute events throughout the day.  Daughter requesting cuff deflation to try to get mother to express her wishes.  Daughter would like herself and father at the bedside when we attempt communication.  12/3:  Insulin and Anti-HTN medications d/c'd.  Comfort measures discussed with Daughter Marysol.  Daughter and pts  at bedside - reinforced understanding of three options moving forward. 1 - full medical management which family does not want.   2 - compassionate extubation.  3 - (which is what family would like) Comfort care with ventilation with natural passing.  Hydromorphone added around the clock as per palliative recs (contacted PRITI Torres for assistance in med/frequency).  Alos, per Loni, tube feeds can remain, if any indication pt not tolerating feeds anymore, feeds will be de-escalated.

## 2024-12-04 NOTE — PROGRESS NOTE ADULT - PROBLEM SELECTOR PLAN 11
- Patients family have decided to pursue comfort measures   - Families Goal is optimizing pain control  - Will continue Gabapentin and Fentanyl Patches (Renew 12/3)  - Continue Dilaudid 0.5 mg IV q2h prn dyspnea or tachypnea  - Dilaudid 0.5 mg IV q2h prn severe pain and Dilaudid 0.2 mg IV q2h prn moderate pain  - Ativan 0.5mg IV q2h prn agitation  - 12/3 added ATC hydromorphone as requested by family to aide in comfort measures

## 2024-12-04 NOTE — PROGRESS NOTE ADULT - PROBLEM SELECTOR PLAN 2
pt on ATC IV Dilaudid, as per pt's family pt remain in acute pain  increased frequency of ATC pain medications to q4 hours prn   0.5mg IVP Dilaudid q4 hours ATC   increased prns to   1mg IVP Dilaudid q2 hours prn for severe pain  0.5mg IVP Dilaudid q2 hours prn for moderate pain   bowel regimen while on opioids

## 2024-12-04 NOTE — CHART NOTE - NSCHARTNOTEFT_GEN_A_CORE
MAXX LOPEZ  Gender: Female  74y  [facility] R[facility]1 [address]    Patient not seen today, chart reviewed.     POCT Blood Glucose:  140 mg/dL (12-03-24 @ 18:04)  183 mg/dL (12-03-24 @ 11:31)      eMAR:  insulin lispro (ADMELOG) corrective regimen sliding scale   1 Unit(s) SubCutaneous (12-03-24 @ 11:49)    predniSONE   Tablet   5 milliGRAM(s) Oral (12-04-24 @ 05:36)    Endocrinology following patient for T2DM management. In the last 24hrs BG levels have been 136-183mg/dL. No hypoglycemia. Per family request, insulin regimen and BG checks are discontinued as patient is transitioning to comfort care. Discussed with primary team and RN, Endocrine to sign off on patient.

## 2024-12-04 NOTE — CHART NOTE - NSCHARTNOTESELECT_GEN_ALL_CORE
AM hypoglycemia/Event Note
Advanced GI
Endocrine follow up/Event Note
Endocrinology/Event Note
Event Note
GI
GOC Discussion/Event Note
Medicine NP/Event Note
Medicine NP/Event Note
Nutrition Services
Palliative care/Event Note
ENT Chart Note
Endocrine follow up/Event Note
Endocrine follow up/Event Note
Endocrinology/Event Note
Endocrinology/Event Note
GI
Goals of care/Event Note
Infectious Diseases/Event Note
Nutrition Services

## 2024-12-04 NOTE — PROGRESS NOTE ADULT - PROBLEM SELECTOR PLAN 6
- Patient on Lantus and Humalog at home    - Standing Ademalog D/c'd   - s/p Lantus and ISS  - d/c'd 12/3 as per family wishes for comfort measures

## 2024-12-04 NOTE — PROGRESS NOTE ADULT - SUBJECTIVE AND OBJECTIVE BOX
SUBJECTIVE AND OBJECTIVE: pt seen and examined at bedside. pt awake with limited participation   Indication for Geriatrics and Palliative Care Services/INTERVAL HPI: GOC    OVERNIGHT EVENTS: no acute events o/n. In 24 hours pt on ATC IV Dilaudid and 1x prn DIlaudid     DNR on chart:Intubate  Intubate      Allergies    walnut (Unknown)  metronidazole (Rash)  Lyrica (Unknown)  penicillin (Unknown)  Pineapple (Unknown)  Tagamet (Unknown)  heparin (Unknown)  Pecans (Unknown)  Hazelnut (Unknown)  meropenem (Rash)    Intolerances    MEDICATIONS  (STANDING):  acetaminophen   Oral Liquid .. 1000 milliGRAM(s) Oral every 8 hours  albuterol/ipratropium for Nebulization 3 milliLiter(s) Nebulizer every 6 hours  artificial  tears Solution 1 Drop(s) Both EYES every 6 hours  chlorhexidine 0.12% Liquid 15 milliLiter(s) Oral Mucosa every 12 hours  chlorhexidine 4% Liquid 1 Application(s) Topical daily  diclofenac sodium 1% Gel 2 Gram(s) Topical every 6 hours  escitalopram 10 milliGRAM(s) Oral <User Schedule>  fentaNYL   Patch  12 MICROgram(s)/Hr 1 Patch Transdermal every 72 hours  fentaNYL   Patch  25 MICROgram(s)/Hr 1 Patch Transdermal every 72 hours  FIRST- Mouthwash  BLM 5 milliLiter(s) Swish and Spit every 8 hours  gabapentin Solution 250 milliGRAM(s) Oral <User Schedule>  hemorrhoidal Ointment 1 Application(s) Rectal at bedtime  HYDROmorphone  Injectable 0.5 milliGRAM(s) IV Push every 4 hours  lactobacillus acidophilus 1 Tablet(s) Oral <User Schedule>  lidocaine   4% Patch 4 Patch Transdermal every 24 hours  nystatin Powder 1 Application(s) Topical every 8 hours  predniSONE   Tablet 5 milliGRAM(s) Oral daily  triamcinolone 0.1% Oral Paste 1 Application(s) Topical every 8 hours  witch hazel Pads 1 Application(s) Topical every 12 hours    MEDICATIONS  (PRN):  aluminum hydroxide/magnesium hydroxide/simethicone Suspension 30 milliLiter(s) Enteral Tube every 4 hours PRN Dyspepsia  HYDROmorphone  Injectable 0.5 milliGRAM(s) IV Push every 2 hours PRN Moderate Pain (4 - 6)  HYDROmorphone  Injectable 1 milliGRAM(s) IV Push every 2 hours PRN Severe Pain (7 - 10)  HYDROmorphone  Injectable 1 milliGRAM(s) IV Push every 2 hours PRN dyspnea  LORazepam   Injectable 0.5 milliGRAM(s) IV Push every 2 hours PRN Agitation      ITEMS UNCHECKED ARE NOT PRESENT    PRESENT SYMPTOMS: [ x]Unable to self-report - see [ ] CPOT [x ] PAINADS [x ] RDOS  Source if other than patient:  [ ]Family   [ x]Team     Pain:  [ ]yes [ ]no  QOL impact -   Location -                    Aggravating factors -  Quality -  Radiation -  Timing-  Severity (0-10 scale):  Minimal acceptable level (0-10 scale):     CPOT:    https://www.[facility].org/getattachment/vhu89z67-5q5f-2w0o-9c0u-2042g4162j9j/Critical-Care-Pain-Observation-Tool-(CPOT)    PAINAD Score: See PAINAD tool and score below       Dyspnea:                           [ ]Mild [ ]Moderate [ ]Severe None     RDOS: See RDOS tool and score below   0 to 2  minimal or no respiratory distress   3  mild distress  4 to 6 moderate distress  >7 severe distress      Anxiety:                             [ ]Mild [ ]Moderate [ ]Severe  Fatigue:                             [ ]Mild [ ]Moderate [ ]Severe  Nausea:                             [ ]Mild [ ]Moderate [ ]Severe  Loss of appetite:              [ ]Mild [ ]Moderate [ ]Severe  Constipation:                    [ ]Mild [ ]Moderate [ ]Severe    PCSSQ[Palliative Care Spiritual Screening Question]   Severity (0-10):  Score of 4 or > indicate consideration of Chaplaincy referral.  Chaplaincy Referral: [ ] yes [ ] refused [ ] following [ ] Deferred     Caregiver Ponder? : [ ] yes [ ] no [ ] Deferred [ ] Declined             Social work referral [ ] Patient & Family Centered Care Referral [ ]     Anticipatory Grief present?:  [ ] yes [ ] no  [ ] Deferred                  Social work referral [ ] Chaplaincy Referral [ ]    		  Other Symptoms:  [ x]All other review of systems negative     Palliative Performance Status Version 2:   See PPSv2 tool and score below         PHYSICAL EXAM:  Vital Signs Last 24 Hrs  T(C): 36.6 (04 Dec 2024 11:25), Max: 36.9 (03 Dec 2024 22:48)  T(F): 97.9 (04 Dec 2024 11:25), Max: 98.5 (03 Dec 2024 22:48)  HR: 83 (04 Dec 2024 12:12) (83 - 98)  BP: 119/54 (04 Dec 2024 11:25) (106/53 - 119/54)  BP(mean): --  RR: 18 (04 Dec 2024 11:25) (18 - 20)  SpO2: 93% (04 Dec 2024 12:12) (91% - 100%)    Parameters below as of 04 Dec 2024 12:12  Patient On (Oxygen Delivery Method): ventilator     I&O's Summary    03 Dec 2024 07:01  -  04 Dec 2024 07:00  --------------------------------------------------------  IN: 1080 mL / OUT: 0 mL / NET: 1080 mL    04 Dec 2024 07:01  -  04 Dec 2024 15:23  --------------------------------------------------------  IN: 0 mL / OUT: 400 mL / NET: -400 mL       GENERAL: [ ]Cachexia    [x ]Alert  [ ]Oriented x   [ ]Lethargic  [ ]Unarousable  [ ]Verbal  [ ]Non-Verbal  Behavioral:   [ ]Anxiety  [ ]Delirium [ ]Agitation [ ]Other  HEENT:  [ ]Normal   [ ]Dry mouth   [x ]ET Tube/Trach  [ ]Oral lesions  PULMONARY:   [ ]Clear [ ]Tachypnea  [ ]Audible excessive secretions   [ ]Rhonchi        [ ]Right [ ]Left [ ]Bilateral  [ ]Crackles        [ ]Right [ ]Left [ ]Bilateral  [ ]Wheezing     [ ]Right [ ]Left [ ]Bilateral  [x ]Diminished BS [ ] Right [ ]Left [ x]Bilateral  CARDIOVASCULAR:    [x ]Regular [ ]Irregular [ ]Tachy  [ ]Red [ ]Murmur [ ]Other  GASTROINTESTINAL:  [ x]Soft  [ ]Distended   [x ]+BS  [x ]Non tender [ ]Tender  [ ]Other [ x]PEG [ ]OGT/ NGT   Last BM:   GENITOURINARY:  [ ]Normal [ ]Incontinent   [ ]Oliguria/Anuria   [x ]Mcbride  MUSCULOSKELETAL:   [ ]Normal   [ x]Weakness  [ x]Bed/Wheelchair bound [ x]Edema +2  NEUROLOGIC:   [ ]No focal deficits  [x ] Cognitive impairment  [ ] Dysphagia [ ]Dysarthria [ ] Paresis [ ]Other   SKIN:   [ ]Normal  [ ]Rash  [ ]Other  [x ]Pressure ulcer(s) [x ]y [ ]n present on admission    CRITICAL CARE:  [ ]Shock Present  [ ]Septic [ ]Cardiogenic [ ]Neurologic [ ]Hypovolemic  [ ]Vasopressors [ ]Inotropes  [ x]Respiratory failure present [ x]Mechanical Ventilation [ ]Non-invasive ventilatory support [ ]High-Flow Mode: AC/ CMV (Assist Control/ Continuous Mandatory Ventilation), RR (machine): 20, TV (machine): 400, FiO2: 30, PEEP: 5, ITime: 1, MAP: 15, PIP: 39  [ ]Acute  [ ]Chronic [ ]Hypoxic  [ ]Hypercarbic [ ]Other  [ ]Other organ failure     LABS:            RADIOLOGY & ADDITIONAL STUDIES:  < from: CT Maxillofacial w/ IV Cont (11.27.24 @ 17:15) >    ACC: [id number] EXAM:  CT MAXILLOFACIAL  IC   ORDERED BY: DAVE TAM     PROCEDURE DATE:  11/27/2024          INTERPRETATION:  CLINICAL INFORMATION: Status post cerebral aneurysm   repair. Left upper dental pain.    COMPARISON: CT head 11/6/2024.    CONTRAST:  IV Contrast: Omnipaque 300  90 cc administered  10 cc discarded    TECHNIQUE:  Serial axial thin section images were obtained from the top   of the frontal sinuses through the bottom of the mandible utilizing   multi-slice helical technique. Reformatted coronal and sagittal images   were obtained.      FINDINGS:    FACIAL BONES/MAXILLA: Nasal bone and anterior nasal spine are intact.    Orbital walls and orbital floors are intact.  Bilateral zygomatic arches   are intact.  Medial andlateral pterygoid plates are intact.  MANDIBLE: Chronic erosive changes of the bilateral mandibular condyles,   secondary to severe TMJ arthropathy, unchanged compared to the 11/06/2024   head CT.  DENTITION: Scattered periapical lucencies involving the bilateral   maxillary canine and left second incisor and mandibular left second   incisor. Scattered dental caries. Multiple teeth are absent. No adjacent   soft tissue abscess collections are noted.    SINONASAL CAVITIES: Extensive mucosal thickening involves the sphenoid   sinus. Correlate for possible sphenoid sinusitis. Bilateral maxillary   polyps versus retention cysts. Mucosal thickening of the bilateral   ethmoid air cells. Bony nasal septum is intact and relatively midline in   location.  TYMPANOMASTOID CAVITIES: The mastoid air cells and middle ear cavities   are extensively opacified, increased compared to the prior CT. Correlate   for mastoid and middle ear effusions versus underlying infection. Mild   prominence of the adenoids of the nasopharynx is noted; correlate with   direct visualization findings.    ORBITAL CONTENTS: Status post right lens replacement.   No retrobulbar   mass or hematoma. Optic nerve sheaths and extraocular muscles are   relatively symmetric and normal in appearance.  REMAINING VISUALIZED BONES: Intact.  MISCELLANEOUS:  No abnormal enhancement or evidence of abscess collection.    IMPRESSION:    Scattered periapical lucencies involving the bilateral maxillary canine   and left second incisor andmandibular left second incisor. Scattered   dental caries. Multiple teeth are absent. No adjacent soft tissue abscess   collections are noted.    Severe bilateral TMJ arthropathy, unchanged.    The mastoid air cells and middle ear cavities are extensively opacified,   increased compared to the prior CT. Correlate for mastoid and middle ear   effusions versus underlying infection. Mild nonspecific prominence of the   adenoids of the nasopharynx is noted; correlate with direct visualization   findings.    --- End of Report ---          SOTO ROSE MD; Resident Radiologist  This document has been electronically signed.  BABATUNDE NICHOLSON MD; Attending Radiologist  This document has been electronically signed. Nov 28 2024  2:01PM    < end of copied text >    Protein Calorie Malnutrition Present: [ ]mild [ ]moderate [ ]severe [ ]underweight [ ]morbid obesity  https://www.andeal.org/vault/2440/web/files/ONC/Table_Clinical%20Characteristics%20to%20Document%20Malnutrition-White%20JV%20et%20al%202012.pdf    Height (cm): 149.9 (11-13-24 @ 13:27), 152.4 (06-20-24 @ 11:41), 152.4 (06-03-24 @ 18:13)  Weight (kg): 54.4 (11-13-24 @ 13:27), 56 (10-02-24 @ 21:56), 54.3 (08-11-24 @ 15:58)  BMI (kg/m2): 24.2 (11-13-24 @ 13:27), 24.1 (10-02-24 @ 21:56), 23.4 (08-11-24 @ 15:58)    [ x]PPSV2 < or = 30%  [ ]significant weight loss [ ]poor nutritional intake [ ]anasarca[ ]Artificial Nutrition    Other REFERRALS:  [ ]Hospice  [ ]Child Life  [ ]Social Work  [ ]Case management [ ]Holistic Therapy     Goals of Care Document:

## 2024-12-04 NOTE — CHART NOTE - NSCHARTNOTEFT_GEN_A_CORE
NUTRITION FOLLOW UP NOTE    PATIENT SEEN FOR: follow up     SOURCE: [] Patient  [x] Current Medical Record  [x] Nursing  [] Family/support person at bedside  [x] Patient unavailable/inappropriate  [] Other:     CHART REVIEWED/EVENTS NOTED.  [] No changes to nutrition care plan to note  [x] Nutrition Status:  - ordered for comfort measures     Diet, NPO with Tube Feed:   Tube Feeding Modality: Jejunostomy  Casie EZ2CAD Peptide 1.5 (KFPEPT1.5RTH)  Total Volume for 24 Hours (mL): 960  Continuous  Until Goal Tube Feed Rate (mL per Hour): 40  Tube Feed Duration (in Hours): 24  Tube Feed Start Time: 06:00  Free Water Flush  Pump   Rate (mL per Hour): 50   Frequency: Every Hour  Free Water Flush Instructions:  50 ml free water flushes Q1hr  Alfred(7 Gm Arginine/7 Gm Glut/1.2 Gm HMB     Qty per Day:  2  No Carb Prosource (1pkg = 15gms Protein)     Qty per Day:  1  Banatrol TF     Qty per Day:  1/2 packet per daily (24 @ 10:54) [Active]    CURRENT DIET ORDER IS:  [] Appropriate:  [] Inadequate:  [x] Other: see below for recommendations     NUTRITION INTAKE/PROVISION:  [] PO:  [x] Enteral Nutrition: [employer] Peptide 1.5 40ml x 24 hours + Alfred BID + No carb Prosource daily + 1/2 packet Banatrol daily providing at 100% provision: 1768kcal (32.5kcal/kg), 92g protein (1.7g/kg) and 672ml free water.   -->Team changed tube feeds to 24 hours from 18 hours .   -->per team: tube feeds can remain, if any indication pt not tolerating feeds anymore, feeds will be de-escalated.  [] Parenteral Nutrition:    ANTHROPOMETRICS:  Drug Dosing Weight  Height (cm): 149.9 (21 Oct 2024 18:49)  Weight (kg): 54.4 (21 Oct 2024 18:49)  BMI (kg/m2): 24.2 (21 Oct 2024 18:49)  BSA (m2): 1.48 (21 Oct 2024 18:49)  10/16 62.5kg   Daily Weight in k.6 ()  Daily Weight in k.1 (11-20)    MEDICATIONS  (STANDING):  predniSONE   Tablet    Pertinent Labs:   A1C with Estimated Average Glucose Result: 5.8 % (10-14-24 @ 05:08)  A1C with Estimated Average Glucose Result: 6.4 % (24 @ 07:32)    Finger Sticks:  POCT Blood Glucose.: 140 mg/dL (03 @ 18:04)    NUTRITIONALLY PERTINENT MEDICATIONS/LABS:  [x] Reviewed  [] Relevant notes on medications/labs:     EDEMA:  [x] Reviewed  [] Relevant notes:    GI/ I&O:  [x] Reviewed  [] Relevant notes:  [] Other:     SKIN:   per flow sheets:  sacrum stage IV   left fifth toe suspected deep tissue injury   left and right hallux medial suspected deep tissue injury    ESTIMATED NEEDS:  Based on: dosing weight 54.4kg   30-35kcal/kg 1632-1904kcal/day  1.4-1.8g/kg 76-98g protein/day  defer fluid needs to team     NUTRITION DIAGNOSIS:  [x] Prior Dx: increased nutrient needs  [] New Dx:    EDUCATION:  [] Yes:  [x] Not appropriate/warranted    NUTRITION CARE PLAN:  1. Comfort measures per electronic medical record. RD remains available as needed.      [] Achieved - Continue current nutrition intervention(s)  [x] Current medical condition precludes nutrition intervention at this time.    MONITORING AND EVALUATION:   RD remains available upon request and will follow up per protocol.    Rufina Morris MS, RD, CDN / Teams

## 2024-12-04 NOTE — PROGRESS NOTE ADULT - PROBLEM SELECTOR PLAN 5
see goc note above  plan is for compassionate extubation tomorrow at 12p in RCU, please ensure live on sign off prior   surrogate is pt's

## 2024-12-04 NOTE — PROGRESS NOTE ADULT - NS ATTEND AMEND GEN_ALL_CORE FT
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 Found to have Pseudomonas aeruginosa in sputum culture and urine culture with ESBL E. coli s/p course of meropenem. Course complicated by antibiotic-associated diarrhea,  encephalopathy.     Clinically minimal improvement.   Intermittently wakes up, complains of pain.   Remains on full mechanical vent support.   Family has decided to pursue comfort measures- will stop FS, majority of medications, c/w Fentanyl patch and dilaudid prn and anxiolytics, other meds for symptoms  Appreciate Palliative care follow up and recs   Dispo - DNR 74F PMH DM2 (insulin-dependent), HTN, HLD, hypothyroidism, RA on Prednisone, fibromyalgia, cerebral aneurysm s/p repair, chronic hypoxemia and hypercapnic respiratory failure s/p trach, vent-dependent, recurrent C diff infection, oropharyngeal dysphagia s/p G-J tube with persistent GJ tube leaks now s/p GC fistula repair 8/12, and recent presentation 5 days PTA for rectal bleeding requiring transfusion in the ED who now presents with acute hypoxemic respiratory distress 2/2 RML PNA, admitted to the RCU for further management. Found to have Pseudomonas aeruginosa in sputum culture and urine culture with ESBL E. coli s/p course of meropenem. Course complicated by antibiotic-associated diarrhea,  encephalopathy.     Clinically minimal improvement.   Intermittently wakes up, complains of pain.   Remains on full mechanical vent support.   Family has decided to pursue comfort measures- majority of medications stopped, c/w Fentanyl patch and dilaudid prn and anxiolytics, other meds for symptom management.   Appreciate Palliative care follow up and recs   Dispo - DNR. Plan is for compassionate extubation tomorrow

## 2024-12-04 NOTE — PROGRESS NOTE ADULT - SUBJECTIVE AND OBJECTIVE BOX
Patient is a 74y old  Female who presents with a chief complaint of Patient admitted with Hypoxemia and episode of Bright red blood per rectum (03 Dec 2024 12:15)    Interval Events:    REVIEW OF SYSTEMS:  [ ] Positive  [ ] All other systems negative  [ ] Unable to assess ROS because ________    Vital Signs Last 24 Hrs  T(C): 36.6 (12-04-24 @ 04:34), Max: 36.9 (12-03-24 @ 22:48)  T(F): 97.9 (12-04-24 @ 04:34), Max: 98.5 (12-03-24 @ 22:48)  HR: 92 (12-04-24 @ 04:34) (71 - 98)  BP: 106/53 (12-04-24 @ 04:34) (106/53 - 126/69)  RR: 20 (12-04-24 @ 04:34) (18 - 20)  SpO2: 99% (12-04-24 @ 04:34) (98% - 100%)    PHYSICAL EXAM:  HEENT:   [ ]Tracheostomy:  [ ]Pupils equal  [ ]No oral lesions  [ ]Abnormal    SKIN  [ ]No Rash  [ ] Abnormal  [ ] pressure    CARDIAC  [ ]Regular  [ ]Abnormal    PULMONARY  [ ]Bilateral Clear Breath Sounds  [ ]Normal Excursion  [ ]Abnormal    GI  [ ]PEG      [ ] +BS		              [ ]Soft, nondistended, nontender	  [ ]Abnormal    MUSCULOSKELETAL                                   [ ]Bedbound                 [ ]Abnormal    [ ]Ambulatory/OOB to chair                           EXTREMITIES                                         [ ]Normal  [ ]Edema                           NEUROLOGIC  [ ] Normal, non focal  [ ] Focal findings:    PSYCHIATRIC  [ ]Alert and appropriate  [ ] Sedated	 [ ]Agitated    :  Mcbride: [ ] Yes, if yes: Date of Placement:                   [  ] No    LINES: Central Lines [ ] Yes, if yes: Date of Placement                                     [  ] No    HOSPITAL MEDICATIONS:  MEDICATIONS  (STANDING):  acetaminophen   Oral Liquid .. 1000 milliGRAM(s) Oral every 8 hours  albuterol/ipratropium for Nebulization 3 milliLiter(s) Nebulizer every 6 hours  artificial  tears Solution 1 Drop(s) Both EYES every 6 hours  chlorhexidine 0.12% Liquid 15 milliLiter(s) Oral Mucosa every 12 hours  chlorhexidine 4% Liquid 1 Application(s) Topical daily  diclofenac sodium 1% Gel 2 Gram(s) Topical every 6 hours  escitalopram 10 milliGRAM(s) Oral <User Schedule>  fentaNYL   Patch  12 MICROgram(s)/Hr 1 Patch Transdermal every 72 hours  fentaNYL   Patch  25 MICROgram(s)/Hr 1 Patch Transdermal every 72 hours  FIRST- Mouthwash  BLM 5 milliLiter(s) Swish and Spit every 8 hours  gabapentin Solution 250 milliGRAM(s) Oral <User Schedule>  hemorrhoidal Ointment 1 Application(s) Rectal at bedtime  HYDROmorphone  Injectable 0.5 milliGRAM(s) IV Push every 6 hours  lactobacillus acidophilus 1 Tablet(s) Oral <User Schedule>  lidocaine   4% Patch 4 Patch Transdermal every 24 hours  nystatin Powder 1 Application(s) Topical every 8 hours  predniSONE   Tablet 5 milliGRAM(s) Oral daily  triamcinolone 0.1% Oral Paste 1 Application(s) Topical every 8 hours  witch hazel Pads 1 Application(s) Topical every 12 hours    MEDICATIONS  (PRN):  aluminum hydroxide/magnesium hydroxide/simethicone Suspension 30 milliLiter(s) Enteral Tube every 4 hours PRN Dyspepsia  HYDROmorphone  Injectable 0.5 milliGRAM(s) IV Push every 2 hours PRN Severe Pain (7 - 10)  HYDROmorphone  Injectable 0.2 milliGRAM(s) IV Push every 2 hours PRN Moderate Pain (4 - 6)  HYDROmorphone  Injectable 0.5 milliGRAM(s) IV Push every 2 hours PRN Dyspnea or Tachypnea  LORazepam   Injectable 0.5 milliGRAM(s) IV Push every 2 hours PRN Agitation    LABS:    CAPILLARY BLOOD GLUCOSE    MICROBIOLOGY:     RADIOLOGY:  [ ] Reviewed and interpreted by me    Mode: AC/ CMV (Assist Control/ Continuous Mandatory Ventilation)  RR (machine): 20  TV (machine): 400  FiO2: 30  PEEP: 5  ITime: 1  MAP: 16  PIP: 35   Patient is a 74y old  Female who presents with a chief complaint of Patient admitted with Hypoxemia and episode of Bright red blood per rectum (03 Dec 2024 12:15)    Interval Events:  No acute events overnight.    REVIEW OF SYSTEMS:  [ ] Positive  [ ] All other systems negative  [X] Unable to assess ROS because ___minimal capacity to communicate____    Vital Signs Last 24 Hrs  T(C): 36.6 (12-04-24 @ 04:34), Max: 36.9 (12-03-24 @ 22:48)  T(F): 97.9 (12-04-24 @ 04:34), Max: 98.5 (12-03-24 @ 22:48)  HR: 92 (12-04-24 @ 04:34) (71 - 98)  BP: 106/53 (12-04-24 @ 04:34) (106/53 - 126/69)  RR: 20 (12-04-24 @ 04:34) (18 - 20)  SpO2: 99% (12-04-24 @ 04:34) (98% - 100%)    PHYSICAL EXAM:  HEENT:   [X]Tracheostomy: #8 XLT shiley  [X]Pupils equal  [ ]No oral lesions  [ ]Abnormal    SKIN  [ ]No Rash  [ ] Abnormal  [X] pressure - sacral unstageable     CARDIAC  [X]Regular  [ ]Abnormal    PULMONARY  [ ]Bilateral Clear Breath Sounds  [ ]Normal Excursion  [X]Abnormal - BL coarse BS    GI  [X]PEG      [X] +BS		              [X]Soft, distended, nontender	  [ ]Abnormal    MUSCULOSKELETAL                                   [X]Bedbound                 [ ]Abnormal    [ ]Ambulatory/OOB to chair                           EXTREMITIES                                         [ ]Normal  [X]Edema - generalized edema            NEUROLOGIC  [ ] Normal, non focal  [X] Focal findings: awake and alert, did not follow commands     PSYCHIATRIC  [X]Alert and appropriate  [ ] Sedated	 [ ]Agitated    :  Mcbride: [ ] Yes, if yes: Date of Placement:                   [X] No    LINES: Central Lines [ ] Yes, if yes: Date of Placement                                     [X] No    HOSPITAL MEDICATIONS:  MEDICATIONS  (STANDING):  acetaminophen   Oral Liquid .. 1000 milliGRAM(s) Oral every 8 hours  albuterol/ipratropium for Nebulization 3 milliLiter(s) Nebulizer every 6 hours  artificial  tears Solution 1 Drop(s) Both EYES every 6 hours  chlorhexidine 0.12% Liquid 15 milliLiter(s) Oral Mucosa every 12 hours  chlorhexidine 4% Liquid 1 Application(s) Topical daily  diclofenac sodium 1% Gel 2 Gram(s) Topical every 6 hours  escitalopram 10 milliGRAM(s) Oral <User Schedule>  fentaNYL   Patch  12 MICROgram(s)/Hr 1 Patch Transdermal every 72 hours  fentaNYL   Patch  25 MICROgram(s)/Hr 1 Patch Transdermal every 72 hours  FIRST- Mouthwash  BLM 5 milliLiter(s) Swish and Spit every 8 hours  gabapentin Solution 250 milliGRAM(s) Oral <User Schedule>  hemorrhoidal Ointment 1 Application(s) Rectal at bedtime  HYDROmorphone  Injectable 0.5 milliGRAM(s) IV Push every 6 hours  lactobacillus acidophilus 1 Tablet(s) Oral <User Schedule>  lidocaine   4% Patch 4 Patch Transdermal every 24 hours  nystatin Powder 1 Application(s) Topical every 8 hours  predniSONE   Tablet 5 milliGRAM(s) Oral daily  triamcinolone 0.1% Oral Paste 1 Application(s) Topical every 8 hours  witch hazel Pads 1 Application(s) Topical every 12 hours    MEDICATIONS  (PRN):  aluminum hydroxide/magnesium hydroxide/simethicone Suspension 30 milliLiter(s) Enteral Tube every 4 hours PRN Dyspepsia  HYDROmorphone  Injectable 0.5 milliGRAM(s) IV Push every 2 hours PRN Severe Pain (7 - 10)  HYDROmorphone  Injectable 0.2 milliGRAM(s) IV Push every 2 hours PRN Moderate Pain (4 - 6)  HYDROmorphone  Injectable 0.5 milliGRAM(s) IV Push every 2 hours PRN Dyspnea or Tachypnea  LORazepam   Injectable 0.5 milliGRAM(s) IV Push every 2 hours PRN Agitation    LABS:    CAPILLARY BLOOD GLUCOSE    MICROBIOLOGY:     RADIOLOGY:  [ ] Reviewed and interpreted by me    Mode: AC/ CMV (Assist Control/ Continuous Mandatory Ventilation)  RR (machine): 20  TV (machine): 400  FiO2: 30  PEEP: 5  ITime: 1  MAP: 16  PIP: 35

## 2024-12-04 NOTE — PROGRESS NOTE ADULT - PROBLEM SELECTOR PLAN 4
- Amolodipine and hydralazine previously dcd in setting of hypotension   - Hydralazine IVP Resumed 11/22 - d/c'd 12/3 as per family wishes for comfort measures  - Norvasc resumed 11/27 - d/c'd 12/3 as per family wishes for comfort measures  - Continue to monitor BP

## 2024-12-04 NOTE — PROGRESS NOTE ADULT - PROBLEM SELECTOR PLAN 6
will continue to follow for goc/ symptoms   continue with prn ativan   increased prn Dilaudid to 1mg IVP q2 hours prn for dyspnea  case discussed with RCU team  Can be reached by TEAMS MTiffanieF 9-5 Loni Amador Any other time please page [phone] if needed

## 2024-12-04 NOTE — PROGRESS NOTE ADULT - ASSESSMENT
73 yo F PMH T2DM on insulin, HTN, HLD, hypothyroidism, RA on Prednisone, Fibromyalgia, cerebral aneurysm s/p repair, chronic hypercapnic respiratory failure s/p trach (vent dependent) and chronic PEG 2022, recurrent C. diff infection (follows with Dr. Newman), persistent GJ tube leaks now s/p GC fistula repair 8/12 BIBEMS on 10/7 with daughter for respiratory distress, hypoxia to 88%.  Pt also noted to have rectal bleeding week prior to presentation requiring transfusion 5 days ago in ED as per daughter. Daughter also reports brownish discharge from G-J tube. In ED, pt afebrile, fiO2 96-98% on 40% on ventilator.  Chest X-ray w/ opacification of right lung concerning for possible PNA.  Admitted to RCU for further management. Sputum cxs grew PSA; treated with course of Cefepime. Patient with decreased H+H received 1 unit of PRBCS on 10/10.  10/14 EGD w/ Dr. Rosales for PEGJ exchange and clippings placed for closure of fistula site.  Persistent fevers treated with meropenem and vanc (d/c'd 10/20).  CT A/P without infectious source.  Continued fevers and persistent leukocytosis 11/1 CDiff positive - po vanco started, IV flagyl added (11/3-11/8). Head CT performed on 11/6 due to concern of lethargy no acute intracranial findings were noted; likely related to metabolic encephalopathy. Patient continued to have persistent fevers and was empirically treated with Cefepime (11/5-11/9) for CR PSA in sputum cx. CT C/A/P performed 11/6 c/w consolidative opacity LLL. Evening of 11/10-11/11, WBC 15 --> 21, procal 1 --> 43, lactate 3.2 -- pt also febrile. Infectious w/u sent 11/10 -- BCx negative, trach aspirate cx pending, UA negative. CT CAP on 11/11 showed left greater than right basilar consolidation and patchy ground glass opacity in both lungs consistent with atelectasis and/or pneumonia; Feeding tube coils in the stomach with tip within the first portion of the duodenum, No bowel obstruction. Cefepime restarted 11/11-11/15 with improvement in leukocytosis. Patient underwent Endoscopic adjustment of peg 11/13 with Dr. Rosales. XR tube check performed. Patient became Septic again on 11/19 requiring Midodrine, Solucortef and HC03 drip; Patient was empirically placed on Zerbaxa completed 11/25. Multiple GOC discussions held during this admission; family meeting held on 11/29 with Palliative care. Ultimately decision was made by family to pursue comfort measures while continuing mechanical ventilation.  They agree with discontinuing further phlebotomy, not initiating antibiotics, no more diagnostic work--ups and no pressors.      12/2:  Bleeding at old PEG site, improved throughout the day after dressing was changed.  No acute events throughout the day.  Daughter requesting cuff deflation to try to get mother to express her wishes.  Daughter would like herself and father at the bedside when we attempt communication.  12/3:  Insulin and Anti-HTN medications d/c'd.  Comfort measures discussed with Daughter Marysol.  Daughter and pts  at bedside - reinforced understanding of three options moving forward. 1 - full medical management which family does not want.   2 - compassionate extubation.  3 - (which is what family would like) Comfort care with ventilation with natural passing.  Hydromorphone added around the clock as per palliative recs (contacted [facility] Torres for assistance in med/frequency).  Alos, per Loni, tube feeds can remain, if any indication pt not tolerating feeds anymore, feeds will be de-escalated.   73 yo F PMH T2DM on insulin, HTN, HLD, hypothyroidism, RA on Prednisone, Fibromyalgia, cerebral aneurysm s/p repair, chronic hypercapnic respiratory failure s/p trach (vent dependent) and chronic PEG 2022, recurrent C. diff infection (follows with Dr. Newman), persistent GJ tube leaks now s/p GC fistula repair 8/12 BIBEMS on 10/7 with daughter for respiratory distress, hypoxia to 88%.  Pt also noted to have rectal bleeding week prior to presentation requiring transfusion 5 days ago in ED as per daughter. Daughter also reports brownish discharge from G-J tube. In ED, pt afebrile, fiO2 96-98% on 40% on ventilator.  Chest X-ray w/ opacification of right lung concerning for possible PNA.  Admitted to RCU for further management. Sputum cxs grew PSA; treated with course of Cefepime. Patient with decreased H+H received 1 unit of PRBCS on 10/10.  10/14 EGD w/ Dr. Rosales for PEGJ exchange and clippings placed for closure of fistula site.  Persistent fevers treated with meropenem and vanc (d/c'd 10/20).  CT A/P without infectious source.  Continued fevers and persistent leukocytosis 11/1 CDiff positive - po vanco started, IV flagyl added (11/3-11/8). Head CT performed on 11/6 due to concern of lethargy no acute intracranial findings were noted; likely related to metabolic encephalopathy. Patient continued to have persistent fevers and was empirically treated with Cefepime (11/5-11/9) for CR PSA in sputum cx. CT C/A/P performed 11/6 c/w consolidative opacity LLL. Evening of 11/10-11/11, WBC 15 --> 21, procal 1 --> 43, lactate 3.2 -- pt also febrile. Infectious w/u sent 11/10 -- BCx negative, trach aspirate cx pending, UA negative. CT CAP on 11/11 showed left greater than right basilar consolidation and patchy ground glass opacity in both lungs consistent with atelectasis and/or pneumonia; Feeding tube coils in the stomach with tip within the first portion of the duodenum, No bowel obstruction. Cefepime restarted 11/11-11/15 with improvement in leukocytosis. Patient underwent Endoscopic adjustment of peg 11/13 with Dr. Rosales. XR tube check performed. Patient became Septic again on 11/19 requiring Midodrine, Solucortef and HC03 drip; Patient was empirically placed on Zerbaxa completed 11/25. Multiple GOC discussions held during this admission; family meeting held on 11/29 with Palliative care. Ultimately decision was made by family to pursue comfort measures while continuing mechanical ventilation.  They agree with discontinuing further phlebotomy, not initiating antibiotics, no more diagnostic work--ups and no pressors.      12/4:  Acute brief episode of hypoxia resolved with suctioning.  Plan is for compassionate extubation tomorrow on RCU unit with Palliative Care team.  Comfort medications adjusted today as per palliative care NP Loni.

## 2024-12-05 NOTE — PROGRESS NOTE ADULT - NS ATTEND OPT1 GEN_ALL_CORE

## 2024-12-05 NOTE — PROGRESS NOTE ADULT - ADVANCED CARE PLANNING
Voluntary discussion occurred addressing advanced care planning

## 2024-12-05 NOTE — PROGRESS NOTE ADULT - RESPIRATORY DISTRESS OBSERVATION: HEART RATE
90 – 109 beats
Less than 90 beats

## 2024-12-05 NOTE — PROGRESS NOTE ADULT - PROBLEM SELECTOR PLAN 11
Will check labs to screen for celiac, check CBC and CMP. If normal, start omeprazole 10 mg daily for 4-6 weeks and then every other day for 1-2 weeks then stop. If no improvement after 2 weeks then increase dose to 20 mg daily.   Follow up with dermatology for skin check of moles on back  Return ADD/ADHD forms when completed to schedule an assessment visit with myself.       Patient Education    ESILLAGE HANDOUT- PATIENT  11 THROUGH 14 YEAR VISITS  Here are some suggestions from [employer] experts that may be of value to your family.     HOW YOU ARE DOING  Enjoy spending time with your family. Look for ways to help out at home.  Follow your family s rules.  Try to be responsible for your schoolwork.  If you need help getting organized, ask your parents or teachers.  Try to read every day.  Find activities you are really interested in, such as sports or theater.  Find activities that help others.  Figure out ways to deal with stress in ways that work for you.  Don t smoke, vape, use drugs, or drink alcohol. Talk with us if you are worried about alcohol or drug use in your family.  Always talk through problems and never use violence.  If you get angry with someone, try to walk away.    HEALTHY BEHAVIOR CHOICES  Find fun, safe things to do.  Talk with your parents about alcohol and drug use.  Say  No!  to drugs, alcohol, cigarettes and e-cigarettes, and sex. Saying  No!  is OK.  Don t share your prescription medicines; don t use other people s medicines.  Choose friends who support your decision not to use tobacco, alcohol, or drugs. Support friends who choose not to use.  Healthy dating relationships are built on respect, concern, and doing things both of you like to do.  Talk with your parents about relationships, sex, and values.  Talk with your parents or another adult you trust about puberty and sexual pressures. Have a plan for how you will handle risky situations.    YOUR GROWING AND CHANGING  BODY  Brush your teeth twice a day and floss once a day.  Visit the dentist twice a year.  Wear a mouth guard when playing sports.  Be a healthy eater. It helps you do well in school and sports.  Have vegetables, fruits, lean protein, and whole grains at meals and snacks.  Limit fatty, sugary, salty foods that are low in nutrients, such as candy, chips, and ice cream.  Eat when you re hungry. Stop when you feel satisfied.  Eat with your family often.  Eat breakfast.  Choose water instead of soda or sports drinks.  Aim for at least 1 hour of physical activity every day.  Get enough sleep.    YOUR FEELINGS  Be proud of yourself when you do something good.  It s OK to have up-and-down moods, but if you feel sad most of the time, let us know so we can help you.  It s important for you to have accurate information about sexuality, your physical development, and your sexual feelings toward the opposite or same sex. Ask us if you have any questions.    STAYING SAFE  Always wear your lap and shoulder seat belt.  Wear protective gear, including helmets, for playing sports, biking, skating, skiing, and skateboarding.  Always wear a life jacket when you do water sports.  Always use sunscreen and a hat when you re outside. Try not to be outside for too long between 11:00 am and 3:00 pm, when it s easy to get a sunburn.  Don t ride ATVs.  Don t ride in a car with someone who has used alcohol or drugs. Call your parents or another trusted adult if you are feeling unsafe.  Fighting and carrying weapons can be dangerous. Talk with your parents, teachers, or doctor about how to avoid these situations.        Consistent with Bright Futures: Guidelines for Health Supervision of Infants, Children, and Adolescents, 4th Edition  For more information, go to https://brightfutures.aap.org.             Patient Education    BRIGHT FUTURES HANDOUT- PARENT  11 THROUGH 14 YEAR VISITS  Here are some suggestions from Bright Futures experts that  may be of value to your family.     HOW YOUR FAMILY IS DOING  Encourage your child to be part of family decisions. Give your child the chance to make more of her own decisions as she grows older.  Encourage your child to think through problems with your support.  Help your child find activities she is really interested in, besides schoolwork.  Help your child find and try activities that help others.  Help your child deal with conflict.  Help your child figure out nonviolent ways to handle anger or fear.  If you are worried about your living or food situation, talk with us. Community agencies and programs such as [employer] can also provide information and assistance.    YOUR GROWING AND CHANGING CHILD  Help your child get to the dentist twice a year.  Give your child a fluoride supplement if the dentist recommends it.  Encourage your child to brush her teeth twice a day and floss once a day.  Praise your child when she does something well, not just when she looks good.  Support a healthy body weight and help your child be a healthy eater.  Provide healthy foods.  Eat together as a family.  Be a role model.  Help your child get enough calcium with low-fat or fat-free milk, low-fat yogurt, and cheese.  Encourage your child to get at least 1 hour of physical activity every day. Make sure she uses helmets and other safety gear.  Consider making a family media use plan. Make rules for media use and balance your child s time for physical activities and other activities.  Check in with your child s teacher about grades. Attend back-to-school events, parent-teacher conferences, and other school activities if possible.  Talk with your child as she takes over responsibility for schoolwork.  Help your child with organizing time, if she needs it.  Encourage daily reading.  YOUR CHILD S FEELINGS  Find ways to spend time with your child.  If you are concerned that your child is sad, depressed, nervous, irritable, hopeless, or angry,  let us know.  Talk with your child about how his body is changing during puberty.  If you have questions about your child s sexual development, you can always talk with us.    HEALTHY BEHAVIOR CHOICES  Help your child find fun, safe things to do.  Make sure your child knows how you feel about alcohol and drug use.  Know your child s friends and their parents. Be aware of where your child is and what he is doing at all times.  Lock your liquor in a cabinet.  Store prescription medications in a locked cabinet.  Talk with your child about relationships, sex, and values.  If you are uncomfortable talking about puberty or sexual pressures with your child, please ask us or others you trust for reliable information that can help.  Use clear and consistent rules and discipline with your child.  Be a role model.    SAFETY  Make sure everyone always wears a lap and shoulder seat belt in the car.  Provide a properly fitting helmet and safety gear for biking, skating, in-line skating, skiing, snowmobiling, and horseback riding.  Use a hat, sun protection clothing, and sunscreen with SPF of 15 or higher on her exposed skin. Limit time outside when the sun is strongest (11:00 am-3:00 pm).  Don t allow your child to ride ATVs.  Make sure your child knows how to get help if she feels unsafe.  If it is necessary to keep a gun in your home, store it unloaded and locked with the ammunition locked separately from the gun.          Helpful Resources:  Family Media Use Plan: www.healthychildren.org/MediaUsePlan   Consistent with Bright Futures: Guidelines for Health Supervision of Infants, Children, and Adolescents, 4th Edition  For more information, go to https://brightfutures.aap.org.              - Patients family have decided to pursue comfort measures   - Families Goal is optimizing pain control  - Will continue Gabapentin and Fentanyl Patches (Renew 12/3)  - Continue Dilaudid 0.5 mg IV q2h prn dyspnea or tachypnea  - Dilaudid 0.5 mg IV q2h prn severe pain and Dilaudid 0.2 mg IV q2h prn moderate pain  - Ativan 0.5mg IV q2h prn agitation  - 12/3 added ATC hydromorphone as requested by family to aide in comfort measures

## 2024-12-05 NOTE — PROGRESS NOTE ADULT - PROBLEM SELECTOR PLAN 2
for pain pt given 1mg IVP Dilaudid with significant relief  c/w 1mg IVP Dilaudid q2 hours prn for severe pain   0.5mg IVP Dilaudid q2 hours prn for moderate pain   bowel regimen while on opioids

## 2024-12-05 NOTE — PROGRESS NOTE ADULT - ASSESSMENT
73 yo F PMH T2DM on insulin, HTN, HLD, hypothyroidism, RA on Prednisone, Fibromyalgia, cerebral aneurysm s/p repair, chronic hypercapnic respiratory failure s/p trach (vent dependent) and chronic PEG 2022, recurrent C. diff infection (follows with Dr. Newman), persistent GJ tube leaks now s/p GC fistula repair 8/12 BIBEMS on 10/7 with daughter for respiratory distress, hypoxia to 88%.  Pt also noted to have rectal bleeding week prior to presentation requiring transfusion 5 days ago in ED as per daughter. Daughter also reports brownish discharge from G-J tube. In ED, pt afebrile, fiO2 96-98% on 40% on ventilator.  Chest X-ray w/ opacification of right lung concerning for possible PNA.  Admitted to RCU for further management. Sputum cxs grew PSA; treated with course of Cefepime. Patient with decreased H+H received 1 unit of PRBCS on 10/10.  10/14 EGD w/ Dr. Rosales for PEGJ exchange and clippings placed for closure of fistula site.  Persistent fevers treated with meropenem and vanc (d/c'd 10/20).  CT A/P without infectious source.  Continued fevers and persistent leukocytosis 11/1 CDiff positive - po vanco started, IV flagyl added (11/3-11/8). Head CT performed on 11/6 due to concern of lethargy no acute intracranial findings were noted; likely related to metabolic encephalopathy. Patient continued to have persistent fevers and was empirically treated with Cefepime (11/5-11/9) for CR PSA in sputum cx. CT C/A/P performed 11/6 c/w consolidative opacity LLL. Evening of 11/10-11/11, WBC 15 --> 21, procal 1 --> 43, lactate 3.2 -- pt also febrile. Infectious w/u sent 11/10 -- BCx negative, trach aspirate cx pending, UA negative. CT CAP on 11/11 showed left greater than right basilar consolidation and patchy ground glass opacity in both lungs consistent with atelectasis and/or pneumonia; Feeding tube coils in the stomach with tip within the first portion of the duodenum, No bowel obstruction. Cefepime restarted 11/11-11/15 with improvement in leukocytosis. Patient underwent Endoscopic adjustment of peg 11/13 with Dr. Rosales. XR tube check performed. Patient became Septic again on 11/19 requiring Midodrine, Solucortef and HC03 drip; Patient was empirically placed on Zerbaxa completed 11/25. Multiple GOC discussions held during this admission; family meeting held on 11/29 with Palliative care. Ultimately decision was made by family to pursue comfort measures while continuing mechanical ventilation.  They agree with discontinuing further phlebotomy, not initiating antibiotics, no more diagnostic work--ups and no pressors.      12/4:  Acute brief episode of hypoxia resolved with suctioning.  Plan is for compassionate extubation tomorrow on RCU unit with Palliative Care team.  Comfort medications adjusted today as per palliative care NP Loni.     73 yo F PMH T2DM on insulin, HTN, HLD, hypothyroidism, RA on Prednisone, Fibromyalgia, cerebral aneurysm s/p repair, chronic hypercapnic respiratory failure s/p trach (vent dependent) and chronic PEG 2022, recurrent C. diff infection (follows with Dr. Newman), persistent GJ tube leaks now s/p GC fistula repair 8/12 BIBEMS on 10/7 with daughter for respiratory distress, hypoxia to 88%.  Pt also noted to have rectal bleeding week prior to presentation requiring transfusion 5 days ago in ED as per daughter. Daughter also reports brownish discharge from G-J tube. In ED, pt afebrile, fiO2 96-98% on 40% on ventilator.  Chest X-ray w/ opacification of right lung concerning for possible PNA.  Admitted to RCU for further management. Sputum cxs grew PSA; treated with course of Cefepime. Patient with decreased H+H received 1 unit of PRBCS on 10/10.  10/14 EGD w/ Dr. Rosales for PEGJ exchange and clippings placed for closure of fistula site.  Persistent fevers treated with meropenem and vanc (d/c'd 10/20).  CT A/P without infectious source.  Continued fevers and persistent leukocytosis 11/1 CDiff positive - po vanco started, IV flagyl added (11/3-11/8). Head CT performed on 11/6 due to concern of lethargy no acute intracranial findings were noted; likely related to metabolic encephalopathy. Patient continued to have persistent fevers and was empirically treated with Cefepime (11/5-11/9) for CR PSA in sputum cx. CT C/A/P performed 11/6 c/w consolidative opacity LLL. Evening of 11/10-11/11, WBC 15 --> 21, procal 1 --> 43, lactate 3.2 -- pt also febrile. Infectious w/u sent 11/10 -- BCx negative, trach aspirate cx pending, UA negative. CT CAP on 11/11 showed left greater than right basilar consolidation and patchy ground glass opacity in both lungs consistent with atelectasis and/or pneumonia; Feeding tube coils in the stomach with tip within the first portion of the duodenum, No bowel obstruction. Cefepime restarted 11/11-11/15 with improvement in leukocytosis. Patient underwent Endoscopic adjustment of peg 11/13 with Dr. Rosales. XR tube check performed. Patient became Septic again on 11/19 requiring Midodrine, Solucortef and HC03 drip; Patient was empirically placed on Zerbaxa completed 11/25. Multiple GOC discussions held during this admission; family meeting held on 11/29 with Palliative care. Ultimately decision was made by family to pursue comfort measures while continuing mechanical ventilation.  They agree with discontinuing further phlebotomy, not initiating antibiotics, no more diagnostic work--ups and no pressors.

## 2024-12-05 NOTE — PROGRESS NOTE ADULT - CONVERSATION/DISCUSSION
Diagnosis/Prognosis/Treatment Options
Diagnosis/Prognosis/MOLST Discussed/Treatment Options

## 2024-12-05 NOTE — PROGRESS NOTE ADULT - REASON FOR ADMISSION
Patient admitted with Hypoxemia and episode of Bright red blood per rectum

## 2024-12-05 NOTE — PROGRESS NOTE ADULT - NS_MD_PANP_GEN_ALL_CORE

## 2024-12-05 NOTE — DISCHARGE NOTE FOR THE EXPIRED PATIENT - HOSPITAL COURSE
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 Found to have Pseudomonas aeruginosa in sputum culture and urine culture with ESBL E. coli s/p course of meropenem. Course complicated by antibiotic-associated diarrhea,  encephalopathy.

## 2024-12-05 NOTE — PROGRESS NOTE ADULT - CONVERSATION DETAILS
Spoke with family via phone. Discussed plan for compassionate withdrawal off ventilator today. Confirmed advanced directives to update MOLST DNR/I, comfort measures only. Plan is for transfer to PCU for withdrawal off ventilator at 2:30 today. Emotional support provided.
Spoke with pt's 2 daughters and pt's  via phone. Discussed overall guarded prognosis. Discussed despite maximum treatment pt continues to develop complications and worsening clinical status. Discussed 3 options: 1. comfort care/ compassionate withdrawal off the vent, 2. ongoing medical care with no transfer to ICU if pt's BP drops, with plan for pt to remain in RCU, 3. ongoing aggressive medical care including transfer to the ICU. Explained that despite maximum effort these interventions are likely not going to change the overall goal. Answered all questions related to 3 options. Family remains undecided.
Spoke with pt's  and daughter. plan is for compassionate extubation tomorrow at 12p in the RCU. Discussed plan and procedure for tomorrow. Family in agreement.
Spoke with family in person and via phone. Discussed ongoing goc, discussed limited benefits further blood draws, finger sticks, ABX, or transfusions can provide at this stage of illness. Discussed options of capping care and allowing the pt to remain ventilated vs a compassionate withdrawal off the vent. Reviewed overall concerns including worsening edema, skin changes, and limited improvement in mental status despite treatments. At this time family would like to continue current level of care. Plan is for IDT family meeting Friday at 12p to discuss options when family arrives. Emotional support provided.
Goals of Care Document:PAYAM Hodge (11-29-24 @ 15:30)  Goals of Care Conversation:   Participants:  · Participants  Family; Staff  · Spouse  Papito  · Child(sergio)  daughters Marysol and Lizzie, son Gerson  · Provider  Dr. Imtiaz Kimbrough DNP of [facility]  ·   Thomas Hodge LCSW    Advance Directives:  · Does patient have Advance Directive  Yes  · Indicate Type  Do Not Resuscitate (DNR); Medical Orders for Life-Sustaining Treatment (MOLST)  · Are any of the items on the chart  Yes  · Specify which ones are on chart  Do Not Resuscitate (DNR)  Medical Orders for Life-Sustaining Treatment (MOLST)  · Does Patient Have a Surrogate  Yes  · Surrogate's Name  spouse Papito  · Does the Patient have a Court Appointed Guardian ([id number])  No  · Caregiver:  yes  · Name  Marysol Sam  · Phone Number  [phone]    Conversation Discussion:  · ADVANCED CARE PLANNING  Voluntary discussion occurred addressing advanced care planning  · Conversation  Diagnosis; Prognosis; Treatment Options; Palliative Care Referral  · Conversation Details  [facility] continues to follow this case on RCU. IDT meeting held with patient's family today.    Team first introduced and reintroduced selves and roles in patient care. Family verbalized understanding and was receptive to meeting today. Team next provided overview of patient's current medical status. Team noted that patient with prolonged admission with complications throughout including multiple infections, GIB, and intermittently worsening O2 levels. Team discussed that patient without any meaningful improvement despite ongoing care provided, and note understanding family goals of not escalating to ICU level of care as well as patient remaining DNR. Team inquired into family thoughts regarding overall care moving forward.     Patient's son and spouse began by stating that, after family discussion held yesterday, 11/29, spouse and son both feel that it is now time to compassionately withdraw care as patient has been suffering and has undergone a tremendous amount in the last two years. Patient's daughters note, however, that they remain torn and continue to have hope and ronaldo that patient might still have some improvement. Family notes wishing that patient would have declared herself and passed on her own or at least inform family of her thoughts regarding next steps. Team validated this with family today but do note that patient has been determined to be without decision-making capacity at this time and thus, cannot provide true insight into her condition or options in care moving forward. Family verbalized understanding.     Family inquired into options available moving forward in care, and team discussed two primary options today. Team noted first option as continuing ongoing medical management and aggressive care, understanding that patient remains with overall poor prognosis. Team noted second option as pursuing a comfort-based approach, with either plan to compassionately withdraw ventilatory support vs capping care and allowing patient's body to take a natural course until passing. Family with many questions today regarding medications and other components of care should they opt for a comfort-based approach, and all questions answered appropriately. Team did note that regardless of choices made in care, patient may declare herself on her own and pass, and family verbalized understanding. Family requesting one more day of ongoing care and management prior to making a decision, but note that they will inform team of any decisions made. Team validated this today and much emotional support provided. Team additionally assured ongoing availability and support, and family verbalized appreciation.    Patient remains DNR. Family discussing next steps in care at present time, no other decisions made currently. [facility] will continue to follow this case.    I, Thomas Hodge, where applicable, am scribing for and the presence of Loni Amador DNP, the following sections PARTICIPANTS, ADVANCED DIRECTIVES, CONVERSATION DISCUSSION, WHAT MATTERS MOST TO PATIENTS AND FAMILY, TREATMENT GUIDELINES, DATE OF MOLST, AND TIME SPENT.    What Matters Most To Patient and Family:  · What matters most to patient and family  patient care, time spent with family    Personal Advance Directives Treatment Guidelines:   Treatment Guidelines:  · Decision Maker  Surrogate  · Treatment Guidelines  DNR    MOLST:  · Updated  19-Nov-2024    Location of Discussion:   Time Spent on Advance Care Planning:  Attending or [facility] Only.     I personally spent 80 minutes on advance care planning services with the patient. This time is separate and distinct from any other care management services provided on this date.    Location of Discussion:  · Location of discussion  Face to face      Electronic Signatures:  Thomas Hodge ([facility])  (Signed 29-Nov-2024 15:53)  	Authored: Goals of Care Conversation, Personal Advance Directives Treatment Guidelines, Location of Discussion      Last Updated: 29-Nov-2024 15:53 by Thomas Hodge ([facility] [place])
Discussed at length patient's current medical condition, prognosis and outcome. Each family member expressed what they feel should be the next step for patient, with son Gerson , his wife and patient's  opting for compassionate withdrawal citing prolonged suffering for patient. Marysol expresses that she feels torn and mother has come back from severe illnesses multiple times, and she is better today and can communicate better today as well. Family expresses that it is difficult for them to make this decision, and hoped that the patient could make this decision herself or would have passed on her own. Discussed with family the next steps in patients care including continuing medical management and pursuing comfort measures, both with and without continued ventilator support. Multiple questions answered about symptom management for both comfort approaches and what continued management would also ential.    Family expressed one additional day to discuss amongst themselves, to ultimately make a decision tomorrow. Please see palliative care note for further details.    At this time, patient remains DNR without escalation to ICU. Will continue current management at this time. Location of Discussion:

## 2024-12-05 NOTE — PROGRESS NOTE ADULT - PROBLEM SELECTOR PLAN 6
will continue to follow for goc/ symptoms   continue with prn ativan   case discussed with RCU team  Can be reached by TEAMS M-F 9-5 Loni Amador Any other time please page [phone] if needed

## 2024-12-05 NOTE — PROGRESS NOTE ADULT - PROBLEM SELECTOR PROBLEM 1
Acute hypoxic respiratory failure
Type 2 diabetes mellitus with hyperglycemia
Type 2 diabetes mellitus with hyperglycemia
Acute hypoxic respiratory failure
Type 2 diabetes mellitus with hyperglycemia
Acute hypoxic respiratory failure
Type 2 diabetes mellitus with hyperglycemia
Type 2 diabetes mellitus with hyperglycemia
Acute hypoxic respiratory failure
Type 2 diabetes mellitus with hyperglycemia
Acute hypoxic respiratory failure
Type 2 diabetes mellitus with hyperglycemia
Type 2 diabetes mellitus with hyperglycemia
Acute hypoxic respiratory failure
Type 2 diabetes mellitus with hyperglycemia
Acute hypoxic respiratory failure
Type 2 diabetes mellitus with hyperglycemia
Acute hypoxic respiratory failure
Type 2 diabetes mellitus with hyperglycemia
Acute hypoxic respiratory failure
Type 2 diabetes mellitus with hyperglycemia
Acute hypoxic respiratory failure
Type 2 diabetes mellitus with hyperglycemia
Acute hypoxic respiratory failure
Type 2 diabetes mellitus with hyperglycemia
Type 2 diabetes mellitus with hyperglycemia
Acute hypoxic respiratory failure
Type 2 diabetes mellitus with hyperglycemia
Acute hypoxic respiratory failure
Acute hypoxic respiratory failure
Type 2 diabetes mellitus with hyperglycemia
Type 2 diabetes mellitus with hyperglycemia
Acute hypoxic respiratory failure

## 2024-12-05 NOTE — PROGRESS NOTE ADULT - PROVIDER SPECIALTY LIST ADULT
Endocrinology
Gastroenterology
Gastroenterology
Heme/Onc
Heme/Onc
Infectious Disease
Pulmonology
Wound Care
Endocrinology
Heme/Onc
Infectious Disease
Palliative Care
Pulmonology
Wound Care
Endocrinology
Gastroenterology
Heme/Onc
Infectious Disease
Pulmonology
Wound Care
Wound Care
Heme/Onc
Heme/Onc
Infectious Disease
Pulmonology
Pulmonology
Endocrinology
Endocrinology
Pulmonology
Pulmonology
Endocrinology
Endocrinology
Pulmonology
Endocrinology
Palliative Care
Pulmonology
Endocrinology
Endocrinology
Palliative Care
Pulmonology
Pulmonology
Endocrinology
Pulmonology
Endocrinology
Palliative Care
Endocrinology
Endocrinology
Pulmonology
Pulmonology
Endocrinology
Endocrinology
Palliative Care
Pulmonology
Pulmonology
Endocrinology
Pulmonology
Endocrinology
Pulmonology
Endocrinology
Pulmonology
Endocrinology
Endocrinology
Pulmonology
Critical Care
Pulmonology
Palliative Care

## 2024-12-05 NOTE — PROGRESS NOTE ADULT - SUBJECTIVE AND OBJECTIVE BOX
SUBJECTIVE AND OBJECTIVE: pt seen and examined at bedside. pt   Indication for Geriatrics and Palliative Care Services/INTERVAL HPI: GOC/symptoms     OVERNIGHT EVENTS: o/n pt received ATC 0.5mg IVP Dilaudid and prn Dilaudid     DNR on chart:Intubate  Intubate      Allergies    walnut (Unknown)  metronidazole (Rash)  Lyrica (Unknown)  penicillin (Unknown)  Pineapple (Unknown)  Tagamet (Unknown)  heparin (Unknown)  Pecans (Unknown)  Hazelnut (Unknown)  meropenem (Rash)    Intolerances    MEDICATIONS  (STANDING):  acetaminophen   Oral Liquid .. 1000 milliGRAM(s) Oral every 8 hours  albuterol/ipratropium for Nebulization 3 milliLiter(s) Nebulizer every 6 hours  artificial  tears Solution 1 Drop(s) Both EYES every 6 hours  chlorhexidine 0.12% Liquid 15 milliLiter(s) Oral Mucosa every 12 hours  chlorhexidine 4% Liquid 1 Application(s) Topical daily  diclofenac sodium 1% Gel 2 Gram(s) Topical every 6 hours  escitalopram 10 milliGRAM(s) Oral <User Schedule>  fentaNYL   Patch  12 MICROgram(s)/Hr 1 Patch Transdermal every 72 hours  fentaNYL   Patch  25 MICROgram(s)/Hr 1 Patch Transdermal every 72 hours  FIRST- Mouthwash  BLM 5 milliLiter(s) Swish and Spit every 8 hours  gabapentin Solution 250 milliGRAM(s) Oral <User Schedule>  glycopyrrolate Injectable 1 milliGRAM(s) IV Push every 4 hours  hemorrhoidal Ointment 1 Application(s) Rectal at bedtime  HYDROmorphone  Injectable 0.5 milliGRAM(s) IV Push every 4 hours  lactobacillus acidophilus 1 Tablet(s) Oral <User Schedule>  lidocaine   4% Patch 4 Patch Transdermal every 24 hours  nystatin Powder 1 Application(s) Topical every 8 hours  predniSONE   Tablet 5 milliGRAM(s) Oral daily  triamcinolone 0.1% Oral Paste 1 Application(s) Topical every 8 hours  witch hazel Pads 1 Application(s) Topical every 12 hours    MEDICATIONS  (PRN):  aluminum hydroxide/magnesium hydroxide/simethicone Suspension 30 milliLiter(s) Enteral Tube every 4 hours PRN Dyspepsia  HYDROmorphone  Injectable 0.5 milliGRAM(s) IV Push every 2 hours PRN Moderate Pain (4 - 6)  HYDROmorphone  Injectable 1 milliGRAM(s) IV Push every 2 hours PRN Severe Pain (7 - 10)  HYDROmorphone  Injectable 1 milliGRAM(s) IV Push every 2 hours PRN dyspnea  LORazepam   Injectable 0.5 milliGRAM(s) IV Push every 2 hours PRN Agitation      ITEMS UNCHECKED ARE NOT PRESENT    PRESENT SYMPTOMS: [x ]Unable to self-report - see [ ] CPOT [x ] PAINADS [ x] RDOS  Source if other than patient:  [ ]Family   [ x]Team     Pain:  [ ]yes [ ]no  QOL impact -   Location -                    Aggravating factors -  Quality -  Radiation -  Timing-  Severity (0-10 scale):  Minimal acceptable level (0-10 scale):     CPOT:    https://www.[facility].org/getattachment/[email or web address]/Critical-Care-Pain-Observation-Tool-(CPOT)    PAINAD Score: See PAINAD tool and score below       Dyspnea:                           [ ]Mild [ ]Moderate [ ]Severe    RDOS: See RDOS tool and score below   0 to 2  minimal or no respiratory distress   3  mild distress  4 to 6 moderate distress  >7 severe distress      Anxiety:                             [ ]Mild [ ]Moderate [ ]Severe  Fatigue:                             [ ]Mild [ ]Moderate [ ]Severe  Nausea:                             [ ]Mild [ ]Moderate [ ]Severe  Loss of appetite:              [ ]Mild [ ]Moderate [ ]Severe  Constipation:                    [ ]Mild [ ]Moderate [ ]Severe    PCSSQ[Palliative Care Spiritual Screening Question]   Severity (0-10):  Score of 4 or > indicate consideration of Chaplaincy referral.  Chaplaincy Referral: [ ] yes [ ] refused [ ] following [ ] Deferred     Caregiver [place]? : [x ] yes [ ] no [ ] Deferred [ ] Declined             Social work referral [ x] Patient & Family Centered Care Referral [ ]     Anticipatory Grief present?:  [x ] yes [ ] no  [ ] Deferred                  Social work referral x[ ] Chaplaincy Referral [ ]    		  Other Symptoms:  [ x]All other review of systems negative- pt with limited ability to particiapte      Palliative Performance Status Version 2:   See PPSv2 tool and score below         PHYSICAL EXAM:  Vital Signs Last 24 Hrs  T(C): 36.3 (05 Dec 2024 04:34), Max: 36.3 (05 Dec 2024 04:34)  T(F): 97.3 (05 Dec 2024 04:34), Max: 97.3 (05 Dec 2024 04:34)  HR: 84 (05 Dec 2024 11:21) (76 - 96)  BP: 114/62 (05 Dec 2024 04:34) (114/62 - 126/55)  BP(mean): --  RR: 20 (05 Dec 2024 04:34) (20 - 20)  SpO2: 88% (05 Dec 2024 11:21) (88% - 98%)    Parameters below as of 05 Dec 2024 11:21  Patient On (Oxygen Delivery Method): ventilator     I&O's Summary    04 Dec 2024 07:01  -  05 Dec 2024 07:00  --------------------------------------------------------  IN: 2440 mL / OUT: 400 mL / NET: 2040 mL       GENERAL: [ ]Cachexia    [ ]Alert  [ ]Oriented x   [ ]Lethargic  [ ]Unarousable  [ ]Verbal  [ ]Non-Verbal  Behavioral:   [ ]Anxiety  [ ]Delirium [ ]Agitation [ ]Other  HEENT:  [ ]Normal   [ ]Dry mouth   [ ]ET Tube/Trach  [ ]Oral lesions  PULMONARY:   [ ]Clear [ ]Tachypnea  [ ]Audible excessive secretions   [ ]Rhonchi        [ ]Right [ ]Left [ ]Bilateral  [ ]Crackles        [ ]Right [ ]Left [ ]Bilateral  [ ]Wheezing     [ ]Right [ ]Left [ ]Bilateral  [ ]Diminished BS [ ] Right [ ]Left [ ]Bilateral  CARDIOVASCULAR:    [ ]Regular [ ]Irregular [ ]Tachy  [ ]Red [ ]Murmur [ ]Other  GASTROINTESTINAL:  [ ]Soft  [ ]Distended   [ ]+BS  [ ]Non tender [ ]Tender  [ ]Other [ ]PEG [ ]OGT/ NGT   Last BM:   GENITOURINARY:  [ ]Normal [ ]Incontinent   [ ]Oliguria/Anuria   [ ]Mcbride  MUSCULOSKELETAL:   [ ]Normal   [ ]Weakness  [ ]Bed/Wheelchair bound [ ]Edema  NEUROLOGIC:   [ ]No focal deficits  [ ] Cognitive impairment  [ ] Dysphagia [ ]Dysarthria [ ] Paresis [ ]Other   SKIN:   [ ]Normal  [ ]Rash  [ ]Other  [ ]Pressure ulcer(s) [ ]y [ ]n present on admission    CRITICAL CARE:  [ ]Shock Present  [ ]Septic [ ]Cardiogenic [ ]Neurologic [ ]Hypovolemic  [ ]Vasopressors [ ]Inotropes  [ ]Respiratory failure present [ ]Mechanical Ventilation [ ]Non-invasive ventilatory support [ ]High-Flow Mode: AC/ CMV (Assist Control/ Continuous Mandatory Ventilation), RR (machine): 20, TV (machine): 350, FiO2: 100, PEEP: 5, ITime: 0.9, MAP: 18, PIP: 49  [ ]Acute  [ ]Chronic [ ]Hypoxic  [ ]Hypercarbic [ ]Other  [ ]Other organ failure     LABS:            RADIOLOGY & ADDITIONAL STUDIES:    Protein Calorie Malnutrition Present: [ ]mild [ ]moderate [ ]severe [ ]underweight [ ]morbid obesity  https://www.andeal.org/vault/2440/web/files/ONC/Table_Clinical%20Characteristics%20to%20Document%20Malnutrition-White%20JV%20et%20al%414672.pdf    Height (cm): 149.9 (11-13-24 @ 13:27), 152.4 (06-20-24 @ 11:41), 152.4 (06-03-24 @ 18:13)  Weight (kg): 54.4 (11-13-24 @ 13:27), 56 (10-02-24 @ 21:56), 54.3 (08-11-24 @ 15:58)  BMI (kg/m2): 24.2 (11-13-24 @ 13:27), 24.1 (10-02-24 @ 21:56), 23.4 (08-11-24 @ 15:58)    [ ]PPSV2 < or = 30%  [ ]significant weight loss [ ]poor nutritional intake [ ]anasarca[ ]Artificial Nutrition    Other REFERRALS:  [ ]Hospice  [ ]Child Life  [ ]Social Work  [ ]Case management [ ]Holistic Therapy     Goals of Care Document:PAYAM Hodge (11-29-24 @ 15:30)  Goals of Care Conversation:   Participants:  · Participants  Family; Staff  · Spouse  Papito  · Child(sergio)  daughters Marysol and Lizzie, son Gerson  · Provider  Loni Casanova DNP, [facility]  ·   Thomas Hodge LCSW    Advance Directives:  · Does patient have Advance Directive  Yes  · Indicate Type  Do Not Resuscitate (DNR); Medical Orders for Life-Sustaining Treatment (MOLST)  · Are any of the items on the chart  Yes  · Specify which ones are on chart  Do Not Resuscitate (DNR)  Medical Orders for Life-Sustaining Treatment (MOLST)  · Does Patient Have a Surrogate  Yes  · Surrogate's Name  yumiko Cobos  · Does the Patient have a Court Appointed Guardian ([address])  No  · Caregiver:  yes  · Name  Marysol Sam  · Phone Number  [phone]    Conversation Discussion:  · ADVANCED CARE PLANNING  Voluntary discussion occurred addressing advanced care planning  · Conversation  Diagnosis; Prognosis; Treatment Options; Palliative Care Referral  · Conversation Details  [facility] continues to follow this case on [facility]. IDT meeting held with patient's family today.    Team first introduced and reintroduced selves and roles in patient care. Family verbalized understanding and was receptive to meeting today. Team next provided overview of patient's current medical status. Team noted that patient with prolonged admission with complications throughout including multiple infections, GIB, and intermittently worsening O2 levels. Team discussed that patient without any meaningful improvement despite ongoing care provided, and note understanding family goals of not escalating to ICU level of care as well as patient remaining DNR. Team inquired into family thoughts regarding overall care moving forward.     Patient's son and spouse began by stating that, after family discussion held yesterday, 11/29, spouse and son both feel that it is now time to compassionately withdraw care as patient has been suffering and has undergone a tremendous amount in the last two years. Patient's daughters note, however, that they remain torn and continue to have hope and ronaldo that patient might still have some improvement. Family notes wishing that patient would have declared herself and passed on her own or at least inform family of her thoughts regarding next steps. Team validated this with family today but do note that patient has been determined to be without decision-making capacity at this time and thus, cannot provide true insight into her condition or options in care moving forward. Family verbalized understanding.     Family inquired into options available moving forward in care, and team discussed two primary options today. Team noted first option as continuing ongoing medical management and aggressive care, understanding that patient remains with overall poor prognosis. Team noted second option as pursuing a comfort-based approach, with either plan to compassionately withdraw ventilatory support vs capping care and allowing patient's body to take a natural course until passing. Family with many questions today regarding medications and other components of care should they opt for a comfort-based approach, and all questions answered appropriately. Team did note that regardless of choices made in care, patient may declare herself on her own and pass, and family verbalized understanding. Family requesting one more day of ongoing care and management prior to making a decision, but note that they will inform team of any decisions made. Team validated this today and much emotional support provided. Team additionally assured ongoing availability and support, and family verbalized appreciation.    Patient remains DNR. Family discussing next steps in care at present time, no other decisions made currently. [facility] will continue to follow this case.    I, Thomas Hodge, where applicable, am scribing for and the presence of Loni Amador DNP, the following sections PARTICIPANTS, ADVANCED DIRECTIVES, CONVERSATION DISCUSSION, WHAT MATTERS MOST TO PATIENTS AND FAMILY, TREATMENT GUIDELINES, DATE OF MOLST, AND TIME SPENT.    What Matters Most To Patient and Family:  · What matters most to patient and family  patient care, time spent with family    Personal Advance Directives Treatment Guidelines:   Treatment Guidelines:  · Decision Maker  Surrogate  · Treatment Guidelines  DNR    MOLST:  · Updated  19-Nov-2024    Location of Discussion:   Time Spent on Advance Care Planning:  Attending or PUJA Only.     I personally spent 80 minutes on advance care planning services with the patient. This time is separate and distinct from any other care management services provided on this date.    Location of Discussion:  · Location of discussion  Face to face      Electronic Signatures:  Thomas Hodge ([facility] [place]  (Signed 29-Nov-2024 15:53)  	Authored: Goals of Care Conversation, Personal Advance Directives Treatment Guidelines, Location of Discussion      Last Updated: 29-Nov-2024 15:53 by Thomas Hodge ([facility] [place])     SUBJECTIVE AND OBJECTIVE: pt seen and examined at bedside. pt   Indication for Geriatrics and Palliative Care Services/INTERVAL HPI: GOC/symptoms     OVERNIGHT EVENTS: o/n pt received ATC 0.5mg IVP Dilaudid and prn Dilaudid     DNR on chart:Intubate  Intubate      Allergies    walnut (Unknown)  metronidazole (Rash)  Lyrica (Unknown)  penicillin (Unknown)  Pineapple (Unknown)  Tagamet (Unknown)  heparin (Unknown)  Pecans (Unknown)  Hazelnut (Unknown)  meropenem (Rash)    Intolerances    MEDICATIONS  (STANDING):  acetaminophen   Oral Liquid .. 1000 milliGRAM(s) Oral every 8 hours  albuterol/ipratropium for Nebulization 3 milliLiter(s) Nebulizer every 6 hours  artificial  tears Solution 1 Drop(s) Both EYES every 6 hours  chlorhexidine 0.12% Liquid 15 milliLiter(s) Oral Mucosa every 12 hours  chlorhexidine 4% Liquid 1 Application(s) Topical daily  diclofenac sodium 1% Gel 2 Gram(s) Topical every 6 hours  escitalopram 10 milliGRAM(s) Oral <User Schedule>  fentaNYL   Patch  12 MICROgram(s)/Hr 1 Patch Transdermal every 72 hours  fentaNYL   Patch  25 MICROgram(s)/Hr 1 Patch Transdermal every 72 hours  FIRST- Mouthwash  BLM 5 milliLiter(s) Swish and Spit every 8 hours  gabapentin Solution 250 milliGRAM(s) Oral <User Schedule>  glycopyrrolate Injectable 1 milliGRAM(s) IV Push every 4 hours  hemorrhoidal Ointment 1 Application(s) Rectal at bedtime  HYDROmorphone  Injectable 0.5 milliGRAM(s) IV Push every 4 hours  lactobacillus acidophilus 1 Tablet(s) Oral <User Schedule>  lidocaine   4% Patch 4 Patch Transdermal every 24 hours  nystatin Powder 1 Application(s) Topical every 8 hours  predniSONE   Tablet 5 milliGRAM(s) Oral daily  triamcinolone 0.1% Oral Paste 1 Application(s) Topical every 8 hours  witch hazel Pads 1 Application(s) Topical every 12 hours    MEDICATIONS  (PRN):  aluminum hydroxide/magnesium hydroxide/simethicone Suspension 30 milliLiter(s) Enteral Tube every 4 hours PRN Dyspepsia  HYDROmorphone  Injectable 0.5 milliGRAM(s) IV Push every 2 hours PRN Moderate Pain (4 - 6)  HYDROmorphone  Injectable 1 milliGRAM(s) IV Push every 2 hours PRN Severe Pain (7 - 10)  HYDROmorphone  Injectable 1 milliGRAM(s) IV Push every 2 hours PRN dyspnea  LORazepam   Injectable 0.5 milliGRAM(s) IV Push every 2 hours PRN Agitation      ITEMS UNCHECKED ARE NOT PRESENT    PRESENT SYMPTOMS: [x ]Unable to self-report - see [ ] CPOT [x ] PAINADS [ x] RDOS  Source if other than patient:  [ ]Family   [ x]Team     Pain:  [ ]yes [ ]no  QOL impact -   Location -                    Aggravating factors -  Quality -  Radiation -  Timing-  Severity (0-10 scale):  Minimal acceptable level (0-10 scale):     CPOT:    https://www.[facility].org/getattachment/[email or web address]/Critical-Care-Pain-Observation-Tool-(CPOT)    PAINAD Score: See PAINAD tool and score below       Dyspnea:                           [ ]Mild [ ]Moderate [ ]Severe    RDOS: See RDOS tool and score below   0 to 2  minimal or no respiratory distress   3  mild distress  4 to 6 moderate distress  >7 severe distress      Anxiety:                             [ ]Mild [ ]Moderate [ ]Severe  Fatigue:                             [ ]Mild [ ]Moderate [ ]Severe  Nausea:                             [ ]Mild [ ]Moderate [ ]Severe  Loss of appetite:              [ ]Mild [ ]Moderate [ ]Severe  Constipation:                    [ ]Mild [ ]Moderate [ ]Severe    PCSSQ[Palliative Care Spiritual Screening Question]   Severity (0-10):  Score of 4 or > indicate consideration of Chaplaincy referral.  Chaplaincy Referral: [ ] yes [ ] refused [ ] following [ ] Deferred     Caregiver [place]? : [x ] yes [ ] no [ ] Deferred [ ] Declined             Social work referral [ x] Patient & Family Centered Care Referral [ ]     Anticipatory Grief present?:  [x ] yes [ ] no  [ ] Deferred                  Social work referral x[ ] Chaplaincy Referral [ ]    		  Other Symptoms:  [ x]All other review of systems negative- pt with limited ability to particiapte      Palliative Performance Status Version 2:   See PPSv2 tool and score below         PHYSICAL EXAM:  Vital Signs Last 24 Hrs  T(C): 36.3 (05 Dec 2024 04:34), Max: 36.3 (05 Dec 2024 04:34)  T(F): 97.3 (05 Dec 2024 04:34), Max: 97.3 (05 Dec 2024 04:34)  HR: 84 (05 Dec 2024 11:21) (76 - 96)  BP: 114/62 (05 Dec 2024 04:34) (114/62 - 126/55)  BP(mean): --  RR: 20 (05 Dec 2024 04:34) (20 - 20)  SpO2: 88% (05 Dec 2024 11:21) (88% - 98%)    Parameters below as of 05 Dec 2024 11:21  Patient On (Oxygen Delivery Method): ventilator     I&O's Summary    04 Dec 2024 07:01  -  05 Dec 2024 07:00  --------------------------------------------------------  IN: 2440 mL / OUT: 400 mL / NET: 2040 mL       GENERAL: [ ]Cachexia    [ ]Alert  [ ]Oriented x   [ ]Lethargic  [x ]Unarousable  [ ]Verbal  [ ]Non-Verbal  Behavioral:   [ ]Anxiety  [ ]Delirium [ ]Agitation [ ]Other  HEENT:  [ ]Normal   [ x]Dry mouth   [ ]ET Tube/Trach  [ ]Oral lesions  PULMONARY:   [ ]Clear [ ]Tachypnea  [ ]Audible excessive secretions   [ ]Rhonchi        [ ]Right [ ]Left [ ]Bilateral  [ ]Crackles        [ ]Right [ ]Left [ ]Bilateral  [ ]Wheezing     [ ]Right [ ]Left [ ]Bilateral  [x ]Diminished BS [ ] Right [ ]Left [x ]Bilateral  CARDIOVASCULAR:    [x ]Regular [ ]Irregular [ ]Tachy  [ ]Red [ ]Murmur [ ]Other  GASTROINTESTINAL:  [x ]Soft  [ ]Distended   [ x]+BS  [ x]Non tender [ ]Tender  [ ]Other [ ]PEG [ ]OGT/ NGT   Last BM:   GENITOURINARY:  [ ]Normal [ ]Incontinent   [ ]Oliguria/Anuria   [ x]Mcbride  MUSCULOSKELETAL:   [ ]Normal   [x ]Weakness  [x ]Bed/Wheelchair bound [ ]Edema  NEUROLOGIC:   [ ]No focal deficits  [x ] Cognitive impairment  [ ] Dysphagia [ ]Dysarthria [ ] Paresis [ ]Other   SKIN:   [ ]Normal  [ ]Rash  [ ]Other  [x ]Pressure ulcer(s) [x ]y [ ]n present on admission    CRITICAL CARE:  [ ]Shock Present  [ ]Septic [ ]Cardiogenic [ ]Neurologic [ ]Hypovolemic  [ ]Vasopressors [ ]Inotropes  [ ]Respiratory failure present [ x]Mechanical Ventilation [ ]Non-invasive ventilatory support [ ]High-Flow Mode: AC/ CMV (Assist Control/ Continuous Mandatory Ventilation), RR (machine): 20, TV (machine): 350, FiO2: 100, PEEP: 5, ITime: 0.9, MAP: 18, PIP: 49  [ ]Acute  [ ]Chronic [ ]Hypoxic  [ ]Hypercarbic [ ]Other  [ ]Other organ failure     LABS:            RADIOLOGY & ADDITIONAL STUDIES:    Protein Calorie Malnutrition Present: [ ]mild [ ]moderate [ ]severe [ ]underweight [ ]morbid obesity  https://www.andeal.org/vault/2440/web/files/ONC/Table_Clinical%20Characteristics%20to%20Document%20Malnutrition-White%20JV%20et%20al%305143.pdf    Height (cm): 149.9 (11-13-24 @ 13:27), 152.4 (06-20-24 @ 11:41), 152.4 (06-03-24 @ 18:13)  Weight (kg): 54.4 (11-13-24 @ 13:27), 56 (10-02-24 @ 21:56), 54.3 (08-11-24 @ 15:58)  BMI (kg/m2): 24.2 (11-13-24 @ 13:27), 24.1 (10-02-24 @ 21:56), 23.4 (08-11-24 @ 15:58)    [ x]PPSV2 < or = 30%  [ ]significant weight loss [ ]poor nutritional intake [ ]anasarca[ ]Artificial Nutrition    Other REFERRALS:  [ ]Hospice  [ ]Child Life  [ ]Social Work  [ ]Case management [ ]Holistic Therapy     Goals of Care Document:PAYAM Hodge (11-29-24 @ 15:30)  Goals of Care Conversation:   Participants:  · Participants  Family; Staff  · Spouse  Papito  · Child(sergio)  daughters Marysol and Lizzie, son Gerson  · Provider  LoniDr. Imtiaz Desouza DNP of [facility]  ·   Thomas Hodge [facility]    Advance Directives:  · Does patient have Advance Directive  Yes  · Indicate Type  Do Not Resuscitate (DNR); Medical Orders for Life-Sustaining Treatment (MOLST)  · Are any of the items on the chart  Yes  · Specify which ones are on chart  Do Not Resuscitate (DNR)  Medical Orders for Life-Sustaining Treatment (MOLST)  · Does Patient Have a Surrogate  Yes  · Surrogate's Name  spouse Papito  · Does the Patient have a Court Appointed Guardian ([address])  No  · Caregiver:  yes  · Name  Marysol Sam  · Phone Number  [phone]    Conversation Discussion:  · ADVANCED CARE PLANNING  Voluntary discussion occurred addressing advanced care planning  · Conversation  Diagnosis; Prognosis; Treatment Options; Palliative Care Referral  · Conversation Details  [facility] continues to follow this case on [facility]. IDT meeting held with patient's family today.    Team first introduced and reintroduced selves and roles in patient care. Family verbalized understanding and was receptive to meeting today. Team next provided overview of patient's current medical status. Team noted that patient with prolonged admission with complications throughout including multiple infections, GIB, and intermittently worsening O2 levels. Team discussed that patient without any meaningful improvement despite ongoing care provided, and note understanding family goals of not escalating to ICU level of care as well as patient remaining DNR. Team inquired into family thoughts regarding overall care moving forward.     Patient's son and spouse began by stating that, after family discussion held yesterday, 11/29, spouse and son both feel that it is now time to compassionately withdraw care as patient has been suffering and has undergone a tremendous amount in the last two years. Patient's daughters note, however, that they remain torn and continue to have hope and ronaldo that patient might still have some improvement. Family notes wishing that patient would have declared herself and passed on her own or at least inform family of her thoughts regarding next steps. Team validated this with family today but do note that patient has been determined to be without decision-making capacity at this time and thus, cannot provide true insight into her condition or options in care moving forward. Family verbalized understanding.     Family inquired into options available moving forward in care, and team discussed two primary options today. Team noted first option as continuing ongoing medical management and aggressive care, understanding that patient remains with overall poor prognosis. Team noted second option as pursuing a comfort-based approach, with either plan to compassionately withdraw ventilatory support vs capping care and allowing patient's body to take a natural course until passing. Family with many questions today regarding medications and other components of care should they opt for a comfort-based approach, and all questions answered appropriately. Team did note that regardless of choices made in care, patient may declare herself on her own and pass, and family verbalized understanding. Family requesting one more day of ongoing care and management prior to making a decision, but note that they will inform team of any decisions made. Team validated this today and much emotional support provided. Team additionally assured ongoing availability and support, and family verbalized appreciation.    Patient remains DNR. Family discussing next steps in care at present time, no other decisions made currently. [facility] will continue to follow this case.    I, Thomas Hodge, where applicable, am scribing for and the presence of Loni Amador DNP, the following sections PARTICIPANTS, ADVANCED DIRECTIVES, CONVERSATION DISCUSSION, WHAT MATTERS MOST TO PATIENTS AND FAMILY, TREATMENT GUIDELINES, DATE OF MOLST, AND TIME SPENT.    What Matters Most To Patient and Family:  · What matters most to patient and family  patient care, time spent with family    Personal Advance Directives Treatment Guidelines:   Treatment Guidelines:  · Decision Maker  Surrogate  · Treatment Guidelines  DNR    MOLST:  · Updated  19-Nov-2024    Location of Discussion:   Time Spent on Advance Care Planning:  Attending or PUJA Only.     I personally spent 80 minutes on advance care planning services with the patient. This time is separate and distinct from any other care management services provided on this date.    Location of Discussion:  · Location of discussion  Face to face      Electronic Signatures:  [place] [place]  (Signed 29-Nov-2024 15:53)  	Authored: Goals of Care Conversation, Personal Advance Directives Treatment Guidelines, Location of Discussion      Last Updated: 29-Nov-2024 15:53 by Thomas Hodge ([facility] [place] [facility] [place]

## 2024-12-05 NOTE — PROGRESS NOTE ADULT - NS ATTEND AMEND GEN_ALL_CORE FT
Chronically critically ill, on comfort care with mechanical vent support.   Plan is for compassionate extubation today.

## 2024-12-05 NOTE — PROGRESS NOTE ADULT - PROBLEM SELECTOR PLAN 3
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  No focal intra-abdominal/pelvic or subcutaneous collections.  - Occult 10/7: Positive  - Transfused on 10/10 and 10/30 and 11/12 with appropriate response in H+H  - Heme consult appreciated Anesthesia Type: 1% lidocaine without epinephrine

## 2024-12-05 NOTE — PROGRESS NOTE ADULT - SUBJECTIVE AND OBJECTIVE BOX
Patient is a 74y old  Female who presents with a chief complaint of Patient admitted with Hypoxemia and episode of Bright red blood per rectum (04 Dec 2024 15:23)    HPI:  73 yo F PMH T2DM on insulin, HTN, HLD, hypothyroidism, RA on Prednisone, Fibromyalgia, cerebral aneurysm s/p repair, chronic hypercapnic respiratory failure s/p trach (vent dependent) and chronic PEG 2022, recurrent C. diff infection (follows with Dr. Newman), persistent GJ tube leaks now s/p GC fistula repair 8/12 BIBEMS with daughter for respiratory distress, hypoxia to high 80s.  Pt also noted to have rectal bleeding this past week requiring transfusion 5 days ago in ED as per daughter.  In ED, pt afebrile, fiO2 96-98% on 40% on ventilator.  Chest Xray w/ opacification of right lung concerning for possible PNA.  Admitted to [facility] for further management.  (07 Oct 2024 18:58)    Interval Events:    REVIEW OF SYSTEMS:  [ ] Positive  [ ] All other systems negative  [ ] Unable to assess ROS because ________    Vital Signs Last 24 Hrs  T(C): 36.3 (12-05-24 @ 04:34), Max: 36.6 (12-04-24 @ 11:25)  T(F): 97.3 (12-05-24 @ 04:34), Max: 97.9 (12-04-24 @ 11:25)  HR: 94 (12-05-24 @ 06:00) (76 - 96)  BP: 114/62 (12-05-24 @ 04:34) (114/62 - 126/55)  RR: 20 (12-05-24 @ 04:34) (18 - 20)  SpO2: 92% (12-05-24 @ 06:00) (91% - 98%)    PHYSICAL EXAM:  HEENT:   [ ]Tracheostomy:  [ ]Pupils equal  [ ]No oral lesions  [ ]Abnormal    SKIN  [ ] No Rash  [ ] Abnormal  [ ] pressure    CARDIAC  [ ]Regular  [ ]Abnormal    PULMONARY  [ ]Bilateral Clear Breath Sounds  [ ]Normal Excursion  [ ]Abnormal    GI  [ ]PEG      [ ] +BS		              [ ]Soft, nondistended, nontender	  [ ]Abnormal    MUSCULOSKELETAL                                   [ ]Bedbound                 [ ]Abnormal    [ ]Ambulatory/OOB to chair                           EXTREMITIES                                         [ ]Normal  [ ]Edema                           NEUROLOGIC  [ ] Normal, non focal  [ ] Focal findings:    PSYCHIATRIC  [ ]Alert and appropriate  [ ] Sedated	 [ ]Agitated    :  Mcbride: [ ] Yes, if yes: Date of Placement:                   [  ] No    LINES: Central Lines [ ] Yes, if yes: Date of Placement                                     [  ] No    HOSPITAL MEDICATIONS:  MEDICATIONS  (STANDING):  acetaminophen   Oral Liquid .. 1000 milliGRAM(s) Oral every 8 hours  albuterol/ipratropium for Nebulization 3 milliLiter(s) Nebulizer every 6 hours  artificial  tears Solution 1 Drop(s) Both EYES every 6 hours  chlorhexidine 0.12% Liquid 15 milliLiter(s) Oral Mucosa every 12 hours  chlorhexidine 4% Liquid 1 Application(s) Topical daily  diclofenac sodium 1% Gel 2 Gram(s) Topical every 6 hours  escitalopram 10 milliGRAM(s) Oral <User Schedule>  fentaNYL   Patch  12 MICROgram(s)/Hr 1 Patch Transdermal every 72 hours  fentaNYL   Patch  25 MICROgram(s)/Hr 1 Patch Transdermal every 72 hours  FIRST- Mouthwash  BLM 5 milliLiter(s) Swish and Spit every 8 hours  gabapentin Solution 250 milliGRAM(s) Oral <User Schedule>  hemorrhoidal Ointment 1 Application(s) Rectal at bedtime  HYDROmorphone  Injectable 0.5 milliGRAM(s) IV Push every 4 hours  lactobacillus acidophilus 1 Tablet(s) Oral <User Schedule>  lidocaine   4% Patch 4 Patch Transdermal every 24 hours  nystatin Powder 1 Application(s) Topical every 8 hours  predniSONE   Tablet 5 milliGRAM(s) Oral daily  triamcinolone 0.1% Oral Paste 1 Application(s) Topical every 8 hours  witch hazel Pads 1 Application(s) Topical every 12 hours    MEDICATIONS  (PRN):  aluminum hydroxide/magnesium hydroxide/simethicone Suspension 30 milliLiter(s) Enteral Tube every 4 hours PRN Dyspepsia  HYDROmorphone  Injectable 0.5 milliGRAM(s) IV Push every 2 hours PRN Moderate Pain (4 - 6)  HYDROmorphone  Injectable 1 milliGRAM(s) IV Push every 2 hours PRN Severe Pain (7 - 10)  HYDROmorphone  Injectable 1 milliGRAM(s) IV Push every 2 hours PRN dyspnea  LORazepam   Injectable 0.5 milliGRAM(s) IV Push every 2 hours PRN Agitation      Mode: AC/ CMV (Assist Control/ Continuous Mandatory Ventilation)  RR (machine): 20  TV (machine): 400  FiO2: 30  PEEP: 5  ITime: 1  MAP: 18  PIP: 50   Patient is a 74y old  Female who presents with a chief complaint of Patient admitted with Hypoxemia and episode of Bright red blood per rectum (04 Dec 2024 15:23)    HPI:  73 yo F PMH T2DM on insulin, HTN, HLD, hypothyroidism, RA on Prednisone, Fibromyalgia, cerebral aneurysm s/p repair, chronic hypercapnic respiratory failure s/p trach (vent dependent) and chronic PEG 2022, recurrent C. diff infection (follows with Dr. Newman), persistent GJ tube leaks now s/p GC fistula repair 8/12 BIBEMS with daughter for respiratory distress, hypoxia to high 80s.  Pt also noted to have rectal bleeding this past week requiring transfusion 5 days ago in ED as per daughter.  In ED, pt afebrile, fiO2 96-98% on 40% on ventilator.  Chest Xray w/ opacification of right lung concerning for possible PNA.  Admitted to RCU for further management.  (07 Oct 2024 18:58)    Interval Events: No issues overnight    REVIEW OF SYSTEMS:  [ ] Positive  [ ] All other systems negative  [x ] Unable to assess ROS because unresponsive    Vital Signs Last 24 Hrs  T(C): 36.3 (12-05-24 @ 04:34), Max: 36.6 (12-04-24 @ 11:25)  T(F): 97.3 (12-05-24 @ 04:34), Max: 97.9 (12-04-24 @ 11:25)  HR: 94 (12-05-24 @ 06:00) (76 - 96)  BP: 114/62 (12-05-24 @ 04:34) (114/62 - 126/55)  RR: 20 (12-05-24 @ 04:34) (18 - 20)  SpO2: 92% (12-05-24 @ 06:00) (91% - 98%)    PHYSICAL EXAM:  HEENT:   [X]Tracheostomy: #8 XLT shiley  [X]Pupils equal  [ ]No oral lesions  [ ]Abnormal    SKIN  [ ]No Rash  [ ] Abnormal  [X] pressure - sacral unstageable     CARDIAC  [X]Regular  [ ]Abnormal    PULMONARY  [ ]Bilateral Clear Breath Sounds  [ ]Normal Excursion  [X]Abnormal - BL coarse BS    GI  [X]PEG      [X] +BS		              [X]Soft, distended, nontender	  [ ]Abnormal    MUSCULOSKELETAL                                   [X]Bedbound                 [ ]Abnormal    [ ]Ambulatory/OOB to chair                           EXTREMITIES                                         [ ]Normal  [X]Edema - generalized edema            NEUROLOGIC  [ ] Normal, non focal  [X] Focal findings: awake and alert, did not follow commands     PSYCHIATRIC  [X]Alert and appropriate  [ ] Sedated	 [ ]Agitated    :  Mcbride: [ ] Yes, if yes: Date of Placement:                   [X] No    LINES: Central Lines [ ] Yes, if yes: Date of Placement                                     [X] No  HOSPITAL MEDICATIONS:  MEDICATIONS  (STANDING):  acetaminophen   Oral Liquid .. 1000 milliGRAM(s) Oral every 8 hours  albuterol/ipratropium for Nebulization 3 milliLiter(s) Nebulizer every 6 hours  artificial  tears Solution 1 Drop(s) Both EYES every 6 hours  chlorhexidine 0.12% Liquid 15 milliLiter(s) Oral Mucosa every 12 hours  chlorhexidine 4% Liquid 1 Application(s) Topical daily  diclofenac sodium 1% Gel 2 Gram(s) Topical every 6 hours  escitalopram 10 milliGRAM(s) Oral <User Schedule>  fentaNYL   Patch  12 MICROgram(s)/Hr 1 Patch Transdermal every 72 hours  fentaNYL   Patch  25 MICROgram(s)/Hr 1 Patch Transdermal every 72 hours  FIRST- Mouthwash  BLM 5 milliLiter(s) Swish and Spit every 8 hours  gabapentin Solution 250 milliGRAM(s) Oral <User Schedule>  hemorrhoidal Ointment 1 Application(s) Rectal at bedtime  HYDROmorphone  Injectable 0.5 milliGRAM(s) IV Push every 4 hours  lactobacillus acidophilus 1 Tablet(s) Oral <User Schedule>  lidocaine   4% Patch 4 Patch Transdermal every 24 hours  nystatin Powder 1 Application(s) Topical every 8 hours  predniSONE   Tablet 5 milliGRAM(s) Oral daily  triamcinolone 0.1% Oral Paste 1 Application(s) Topical every 8 hours  witch hazel Pads 1 Application(s) Topical every 12 hours    MEDICATIONS  (PRN):  aluminum hydroxide/magnesium hydroxide/simethicone Suspension 30 milliLiter(s) Enteral Tube every 4 hours PRN Dyspepsia  HYDROmorphone  Injectable 0.5 milliGRAM(s) IV Push every 2 hours PRN Moderate Pain (4 - 6)  HYDROmorphone  Injectable 1 milliGRAM(s) IV Push every 2 hours PRN Severe Pain (7 - 10)  HYDROmorphone  Injectable 1 milliGRAM(s) IV Push every 2 hours PRN dyspnea  LORazepam   Injectable 0.5 milliGRAM(s) IV Push every 2 hours PRN Agitation      Mode: AC/ CMV (Assist Control/ Continuous Mandatory Ventilation)  RR (machine): 20  TV (machine): 400  FiO2: 30  PEEP: 5  ITime: 1  MAP: 18  PIP: 50

## 2024-12-05 NOTE — PROGRESS NOTE ADULT - PROBLEM SELECTOR PLAN 1
pt remains trach to vent   pt now comfort measures only   on ATC Dilaudid for dyspnea/pain 0.5q4 hours ATC   prn 1mg IVP Dilaudid q2 hours prn for dyspnea

## 2024-12-05 NOTE — PROGRESS NOTE ADULT - PROBLEM SELECTOR PROBLEM 12
Advanced care planning/counseling discussion

## 2024-12-05 NOTE — PROGRESS NOTE ADULT - RESPIRATORY DISTRESS OBSERVATION: RESPIRATORY RATE
19 – 30 breaths
Less than or equal to 18 breaths

## 2024-12-05 NOTE — PROGRESS NOTE ADULT - FAMILY/CHILD(REN)
Marysol
Marysol Samuel
Marysol
Marysol
Lizzie Gregg Raj
Discussed with Marysol and  at bedside patients overall decline in last week.  Dr. Zamora spoke to  regarding making patient comfort care and we discussed this with daughter Marysol as well.  Encouraging a family discussion to collectively come to decision tomorrow to compassionately extubate patient

## 2024-12-05 NOTE — PROGRESS NOTE ADULT - NS ATTEND BILL GEN_ALL_CORE
Attending to bill

## 2024-12-21 NOTE — PROGRESS NOTE ADULT - PROBLEM SELECTOR PLAN 3
GENERAL SURGERY DISCHARGE INSTRUCTIONS    Medications used with general anesthesia may stay in your system 24 hours following the procedure.  These may cause you to feel sleepy or drowsy.  Therefore, for at least 24 hours after leaving the hospital:     You must be accompanied by a responsible adult upon discharge and we recommend they stay with you to help care for you.  Do not make important decisions, or sign contracts.  Do not operate potentially dangerous machinery, power tools, or appliances.  Do not drive a motor vehicle.  Do not drink alcoholic beverages, or take sleeping pills/tranquilizers.  Do not smoke.    DIET:  Low fat    NAUSEA AND VOMITING:  Nausea and vomiting may occur as you become more active or begin to increase food intake.  If this should happen simply decrease activities and return to clear liquids.  If the problem should persist longer than 24 - 48 hours, call your physician.    PAIN:        Pain Rating Scale:  6-7-7-3-4-5-6-7-8-9-10                                        No         Mod           Worst                                      Pain        Pain         Possible pain    You may have some pain after your surgery.  A prescription for pain medication may be given to you by your surgeon.  Take this medication as directed.  If it does not help the discomfort, contact your surgeon.  If you have received no prescription, you may take non-prescription, non-asprin medication: Advil - Motrin - Ibuprofen.  Be sure to follow label directions.  All pain medication should be taken with some food to prevent stomach upset.  If this does not adequately relieve the pain, call your surgeon.  Use the pain rating scale to describe your pain to medical personnel.    MEDICATIONS:  When you get your prescription filled, please ask the pharmacist about any risks or cautions related to your medications.  CAUTION: DO NOT exceed Greater than 4 grams of Tylenol in 24 hours!    Recommend alternating 600 mg of  ibuprofen and 500 mg of Tylenol every 6 hours for best pain relief.    OK to restart anticoagulation therapy in 48 hours after procedure.    ACTIVITY:     Gradually return to normal activity beginning the day after surgery as tolerated.  Light activity  Stair climbing ok  Lifting ok / Limit to less than 10 pounds for  2 weeks.   Driving: Resume when off narcotic pain medication.  Tub bath ok in  in 1-2 weeks.    Shower ok after  1 day.      WOUND CARE:    Keep wound clean and dry.  Keep dressing clean and dry.  DO NOT remove dermabond.  Ice bag to incision 24 - 48 hours.    There may be bruising in the lower abdomen and genitals. If it is concerning or enlarging, please call the office.    WHEN TO CALL THE SURGEON:  If dressing saturated with blood, apply pressure with towel to stop, or place clean dressing over saturated one and call your surgeon.  Fever over 101 degrees (orally).  Pain not controlled by pain medications.  If affected extremity becomes white/blue/cold.  Problems with urination.  Unusual or foul smelling drainage from the incision.  Increased redness or swelling around the incision, or of affected extremity, after first 48 hours.    If you have any concerns or problems related to your procedure, please don't hesitate to call me:   Clinic:  [phone]    John Snell DO       Wound Consult requested to assist w/ management of  Sacral/bilateral Buttocks chronic stage 4 pressure injury now resolved with some denuded areas of moisture  Left ischium chronic stage 4 pressure injury now resolved  Incontinence Dermatitis  BL hallux and Lt 5th toe DTPI  c/w wound care

## 2025-01-08 NOTE — PROGRESS NOTE ADULT - SUBJECTIVE AND OBJECTIVE BOX
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 Now for discharge    Request for follow up visit to patient for discharge wound care orders received from primary team ACP. Ms. Woods was encountered on an alternating air with low air loss surface. She is being prepared for discharge and there are plans for the fecal containment device to be removed. She has a healing stage 4 pressure injury on her sacrum/buttocks that has continued to heal during this admission. Her daughter has expressed concern for stool contamination of the wound after the fecal containment device is removed. Recommendations below are intended to protect the wound from fecal contamination.     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  H/O tracheostomy  PEG (percutaneous endoscopic gastrostomy) status    REVIEW OF SYSTEMS  unable to obtain due to patient's nonverbal status    MEDICATIONS  (STANDING):  albuterol/ipratropium for Nebulization 3 milliLiter(s) Nebulizer every 6 hours  amLODIPine   Tablet 10 milliGRAM(s) Oral <User Schedule>  artificial tears (preservative free) Ophthalmic Solution 1 Drop(s) Both EYES every 2 hours  ascorbic acid 500 milliGRAM(s) Oral daily  Biotene Dry Mouth Oral Rinse 5 milliLiter(s) Swish and Spit every 6 hours  bismuth subsalicylate Liquid 15 milliLiter(s) Oral <User Schedule>  chlorhexidine 0.12% Liquid 15 milliLiter(s) Oral Mucosa every 12 hours  chlorhexidine 4% Liquid 1 Application(s) Topical <User Schedule>  cholecalciferol 2000 Unit(s) Oral daily  diclofenac sodium 1% Gel 4 Gram(s) Topical four times a day  erythromycin   Ointment 1 Application(s) Both EYES at bedtime  escitalopram 10 milliGRAM(s) Oral <User Schedule>  fentaNYL   Patch  25 MICROgram(s)/Hr 1 Patch Transdermal every 72 hours  gabapentin Solution 100 milliGRAM(s) Oral <User Schedule>  insulin glargine Injectable (LANTUS) 12 Unit(s) SubCutaneous <User Schedule>  insulin regular  human corrective regimen sliding scale   SubCutaneous <User Schedule>  insulin regular  human recombinant 7 Unit(s) SubCutaneous <User Schedule>  insulin regular  human recombinant 11 Unit(s) SubCutaneous <User Schedule>  insulin regular  human recombinant 9 Unit(s) SubCutaneous <User Schedule>  lactobacillus acidophilus 1 Tablet(s) Oral daily  levothyroxine 125 MICROGram(s) Oral daily  lidocaine   4% Patch 1 Patch Transdermal at bedtime  lidocaine   4% Patch 1 Patch Transdermal at bedtime  melatonin 3 milliGRAM(s) Oral at bedtime  multivitamin/minerals/iron Oral Solution (CENTRUM) 15 milliLiter(s) Oral daily  nystatin Ointment 1 Application(s) Topical every 12 hours  nystatin Powder 1 Application(s) Topical two times a day  pantoprazole   Suspension 40 milliGRAM(s) Oral daily  prednisoLONE acetate 1% Suspension 1 Drop(s) Both EYES four times a day  predniSONE  Solution 5 milliGRAM(s) Oral <User Schedule>  QUEtiapine 12.5 milliGRAM(s) Oral <User Schedule>  simethicone 80 milliGRAM(s) Chew every 6 hours  surgical lubricant sterile 1 Application(s) Topical every 8 hours  vancomycin    Solution 125 milliGRAM(s) Enteral Tube daily    MEDICATIONS  (PRN):  acetaminophen   Oral Liquid .. 1000 milliGRAM(s) Enteral Tube every 6 hours PRN Temp greater or equal to 38C (100.4F), Mild Pain (1 - 3)  calamine/zinc oxide Lotion 1 Application(s) Topical daily PRN Rash and/or Itching  ondansetron Injectable 4 milliGRAM(s) IV Push every 8 hours PRN Nausea and/or Vomiting  oxyCODONE    Solution 5 milliGRAM(s) Enteral Tube every 4 hours PRN Moderate Pain (4 - 6)  sodium chloride 0.65% Nasal 1 Spray(s) Both Nostrils every 12 hours PRN Congestion    Allergies    pineapple (Unknown)  Tagamet (Unknown)  heparin (Unknown)  walnut (Unknown)  metronidazole (Rash)  penicillin (Unknown)  Lyrica (Unknown)  meropenem (Rash)  Pecan, Filbert, Hazelnut (Unknown)    Intolerances    Vital Signs Last 24 Hrs  T(C): 36.7 (01 May 2024 06:00), Max: 37.3 (01 May 2024 00:00)  T(F): 98 (01 May 2024 06:00), Max: 99.2 (01 May 2024 00:00)  HR: 82 (01 May 2024 09:20) (70 - 90)  BP: 110/59 (01 May 2024 06:00) (107/55 - 132/72)  BP(mean): --  RR: 16 (01 May 2024 06:00) (16 - 23)  SpO2: 100% (01 May 2024 09:20) (98% - 100%)    Parameters below as of 01 May 2024 06:00  Patient On (Oxygen Delivery Method): ventilator    Physical Exam:  General: Alert, WN  Ophthamology: sclera clear  ENMT: moist mucous membranes, trachea midline, trach  Respiratory: equal chest rise with respirations, vented  Gastrointestinal: soft NT/ND  Neurology: nonverbal, not following commands  Psych: sedated  Musculoskeletal: no contractures  Vascular: BLE edema equal  Skin:  Sacral/bilateral buttocks with ulceration in and around the gluteal cleft with macerated wound edges, no necrotic tissue, wound bed is red,  L 2.2cm X W 0.3cm x D 0.5cm, scant serosanguinous drainage  Left ischium with scar tissue and contraction, 100% epithelialized  No odor, erythema, increased warmth, tenderness, induration, fluctuance    LABS:  04-30    133<L>  |  99  |  15  ----------------------------<  101<H>  3.7   |  20<L>  |  <0.30<L>    Ca    9.0      30 Apr 2024 10:25  Phos  2.6     04-30  Mg     1.8     04-30    TPro  7.1  /  Alb  3.1<L>  /  TBili  0.5  /  DBili  x   /  AST  92<H>  /  ALT  77<H>  /  AlkPhos  104  04-29                          8.8    9.99  )-----------( 190      ( 30 Apr 2024 10:25 )             28.7       Urinalysis Basic - ( 30 Apr 2024 10:25 )    Color: x / Appearance: x / SG: x / pH: x  Gluc: 101 mg/dL / Ketone: x  / Bili: x / Urobili: x   Blood: x / Protein: x / Nitrite: x   Leuk Esterase: x / RBC: x / WBC x   Sq Epi: x / Non Sq Epi: x / Bacteria: x       No indicators present

## 2025-01-17 NOTE — PROGRESS NOTE ADULT - SUBJECTIVE AND OBJECTIVE BOX
Elizabeth Baeza D Der Nurse Msg Pool  I have submitted this request to the insurance.  We will let you know when we hear back.    Thanks-  Elizabeth Baeza   INTERVAL HPI/OVERNIGHT EVENTS:     bedside this morning   small amount of brown stool noted in rectal tube    MEDICATIONS  (STANDING):  albuterol/ipratropium for Nebulization 3 milliLiter(s) Nebulizer every 6 hours  amLODIPine   Tablet 10 milliGRAM(s) Oral <User Schedule>  artificial tears (preservative free) Ophthalmic Solution 1 Drop(s) Both EYES four times a day  ascorbic acid 500 milliGRAM(s) Oral daily  Biotene Dry Mouth Oral Rinse 5 milliLiter(s) Swish and Spit every 6 hours  cefepime   IVPB 1000 milliGRAM(s) IV Intermittent every 12 hours  cefepime   IVPB      chlorhexidine 0.12% Liquid 15 milliLiter(s) Oral Mucosa every 12 hours  chlorhexidine 4% Liquid 1 Application(s) Topical <User Schedule>  erythromycin   Ointment 1 Application(s) Both EYES three times a day  escitalopram 10 milliGRAM(s) Oral <User Schedule>  fentaNYL   Patch  25 MICROgram(s)/Hr 1 Patch Transdermal every 72 hours  gabapentin Solution 100 milliGRAM(s) Oral <User Schedule>  insulin glargine Injectable (LANTUS) 7 Unit(s) SubCutaneous <User Schedule>  insulin lispro (ADMELOG) corrective regimen sliding scale   SubCutaneous every 6 hours  lactated ringers. 1000 milliLiter(s) (50 mL/Hr) IV Continuous <Continuous>  levothyroxine Injectable 87.5 MICROGram(s) IV Push <User Schedule>  lidocaine   4% Patch 1 Patch Transdermal at bedtime  lidocaine   4% Patch 1 Patch Transdermal at bedtime  melatonin Liquid 3 milliGRAM(s) Oral at bedtime  multivitamin/minerals/iron Oral Solution (CENTRUM) 15 milliLiter(s) Oral daily  pantoprazole  Injectable 40 milliGRAM(s) IV Push <User Schedule>  potassium chloride  10 mEq/100 mL IVPB 10 milliEquivalent(s) IV Intermittent every 1 hour  predniSONE  Solution 5 milliGRAM(s) Oral <User Schedule>  QUEtiapine 12.5 milliGRAM(s) Oral <User Schedule>  simethicone 80 milliGRAM(s) Chew every 12 hours  vancomycin    Solution 125 milliGRAM(s) Oral every 6 hours    MEDICATIONS  (PRN):  calamine/zinc oxide Lotion 1 Application(s) Topical daily PRN Rash and/or Itching  ondansetron Injectable 4 milliGRAM(s) IV Push every 8 hours PRN Nausea and/or Vomiting  petrolatum Ophthalmic Ointment 1 Application(s) Both EYES every 6 hours PRN eye dryness  sodium chloride 0.65% Nasal 1 Spray(s) Both Nostrils every 12 hours PRN Congestion  sodium chloride 0.65% Nasal 1 Spray(s) Both Nostrils two times a day PRN Nasal Congestion  traMADol 25 milliGRAM(s) Oral every 8 hours PRN Mild Pain (1 - 3)      Allergies    pineapple (Unknown)  Tagamet (Unknown)  heparin (Unknown)  walnut (Unknown)  metronidazole (Rash)  penicillin (Unknown)  Lyrica (Unknown)  Pecan, Filbert, Hazelnut (Unknown)    Intolerances        Review of Systems: *pt minimally verbal to nonverbal, unable to obtain ROS       Vital Signs Last 24 Hrs  T(C): 36.9 (11 Apr 2024 05:50), Max: 38.7 (10 Apr 2024 12:32)  T(F): 98.4 (11 Apr 2024 05:50), Max: 101.7 (10 Apr 2024 12:32)  HR: 88 (11 Apr 2024 09:21) (84 - 107)  BP: 119/59 (11 Apr 2024 05:50) (101/63 - 154/59)  BP(mean): --  RR: 13 (11 Apr 2024 09:21) (13 - 18)  SpO2: 100% (11 Apr 2024 09:21) (99% - 100%)    Parameters below as of 11 Apr 2024 09:21  Patient On (Oxygen Delivery Method): ventilator        PHYSICAL EXAM:    Constitutional: NAD  HEENT: EOMI, throat clear  Neck: No LAD, supple  Respiratory: CTA and P  Cardiovascular: S1 and S2, RRR, no M  Gastrointestinal: BS+, soft, NT/ND, neg HSM,  Extremities: No peripheral edema, neg clubbing, cyanosis  Vascular: 2+ peripheral pulses  Neurological: A/O   Psychiatric: Normal mood, normal affect  Skin: No rashes      LABS:                        8.4    6.08  )-----------( 130      ( 10 Apr 2024 09:19 )             29.8     04-11    137  |  100  |  <4<L>  ----------------------------<  125<H>  3.1<L>   |  25  |  <0.30<L>    Ca    8.3<L>      11 Apr 2024 07:15  Phos  3.1     04-11  Mg     1.4     04-11    TPro  5.9<L>  /  Alb  2.6<L>  /  TBili  0.7  /  DBili  x   /  AST  19  /  ALT  17  /  AlkPhos  43  04-11      Urinalysis Basic - ( 11 Apr 2024 07:15 )    Color: x / Appearance: x / SG: x / pH: x  Gluc: 125 mg/dL / Ketone: x  / Bili: x / Urobili: x   Blood: x / Protein: x / Nitrite: x   Leuk Esterase: x / RBC: x / WBC x   Sq Epi: x / Non Sq Epi: x / Bacteria: x        RADIOLOGY & ADDITIONAL TESTS:

## 2025-02-03 ENCOUNTER — APPOINTMENT (OUTPATIENT)
Dept: ENDOCRINOLOGY | Facility: CLINIC | Age: 75
End: 2025-02-03

## 2025-03-19 NOTE — BH CONSULTATION LIAISON PROGRESS NOTE - NSBHMSEKNOW_PSY_A_CORE
HPI    63 y.o. male being seen with the following problem list:    Problem list:  Hematuria     02/28/25 - About 2 year ago, saw blood in his underwear, not in urine.    Family history of PCa, father and grandfather had prostate issues/PCa.     2/28/25 UA - neg for occult blood      03/20/25 - Seen today over telehealth, performed this visit using real-time telehealth tools, including an audio/video connection between Justin Martinez at home and Lebron Yates MD at [facility]. Consent for telemedicine visit was obtained.   Doing well symptomatically, no issues or concerns.     PSA   10/2019 - 0.66      Lab Results   Component Value Date    PSA 0.41 02/28/2025              Current Medications:  Current Outpatient Medications   Medication Sig Dispense Refill    aspirin 81 mg EC tablet Take by mouth once daily.      chlorthalidone (Hygroton) 25 mg tablet Take 1 tablet (25 mg) by mouth once daily. 90 tablet 1    famotidine (Pepcid) 20 mg tablet Take 1 tablet (20 mg) by mouth once daily.      multivitamin tablet Take 1 tablet by mouth once daily.      mv-min-folic acid-lutein 200-137.5 mcg tablet,chewable Chew once daily.       No current facility-administered medications for this visit.        Active Problems:  Justin Martinez is a 63 y.o. male with the following Problems and Medications.  Patient Active Problem List   Diagnosis    Chronic atrial fibrillation (Multi)    HTN (hypertension)    ALEX (obstructive sleep apnea)    Gastroesophageal reflux disease    Anticoagulant long-term use    Chronic neck pain    Atrial fibrillation (Multi)    Umbilical hernia without obstruction and without gangrene    Other melanin hyperpigmentation    Obesity with body mass index 30 or greater    Melanocytic nevi of trunk    Intermittent palpitations    History of basal cell carcinoma (BCC)    Heartburn    Calcification of coronary artery    Edema    Dyslipidemia    Snoring    Chronic venous insufficiency    Atherosclerosis of 
coronary artery    Acute rhinosinusitis    Actinic keratosis    Benign essential hypertension     Current Outpatient Medications   Medication Sig Dispense Refill    aspirin 81 mg EC tablet Take by mouth once daily.      chlorthalidone (Hygroton) 25 mg tablet Take 1 tablet (25 mg) by mouth once daily. 90 tablet 1    famotidine (Pepcid) 20 mg tablet Take 1 tablet (20 mg) by mouth once daily.      multivitamin tablet Take 1 tablet by mouth once daily.      mv-min-folic acid-lutein 200-137.5 mcg tablet,chewable Chew once daily.       No current facility-administered medications for this visit.       PMH:  Past Medical History:   Diagnosis Date    A-fib (Multi)     Encounter for screening for cardiovascular disorders 10/07/2019    Screening for cardiovascular condition    Encounter for screening for malignant neoplasm of prostate 10/07/2019    Screening for prostate cancer    HLD (hyperlipidemia)     HTN (hypertension)     ALEX (obstructive sleep apnea)        PSH:  Past Surgical History:   Procedure Laterality Date    CARDIAC ELECTROPHYSIOLOGY PROCEDURE N/A 11/3/2023    Procedure: Ablation A-Fib;  Surgeon: Juan Díaz MD;  Location: [place] [address] Cardiac Cath Lab;  Service: Electrophysiology;  Laterality: N/A;  SDA/RFA AFIB/CARTO/GA WHOLE       FMH:  Family History   Problem Relation Name Age of Onset    Coronary artery disease Father         SHx:  Social History     Tobacco Use    Smoking status: Never    Smokeless tobacco: Never   Substance Use Topics    Alcohol use: Yes     Comment: holidays    Drug use: Never       Allergies:  No Known Allergies      Assessment/Plan  UA shows no blood in urine. No concerns from urologic standpoint, if symptoms recur he can reach out to me, otherwise follow up prn.     PSA is low, reassuring. I recommend re-checking PSA in 1-2 years, this can be done with his PCP.       Scribe Attestation  By signing my name below, I, Sangeeta Zavaleta, [place], attest that this documentation has been 
prepared under the direction and in the presence of Lebron Yates MD.   
Unable to assess

## 2025-04-07 NOTE — ED PROVIDER NOTE - OBJECTIVE STATEMENT
Left voicemail to return call.    As per EMS, they were called for shortness of breath.  Patient is chronically vented and at home today had an episode of respiratory distress with her oxygen saturation dropped into the 80s so daughter who was at bedside started to bag her and performed chest PT as well as suctioned her trach.  As per EMS upon arrival patient's oxygen saturation was 96 to 98% on 40% FiO2 and end-tidal CO2 was around 50.  Patient on arrival is in no respiratory distress.  Daughter also reports episode of bright red blood per rectum this morning.  Of note patient was here 5 days ago for low hemoglobin, patient was transfused PRBCs and family at that time declined admission so patient was discharged home. As per EMS, they were called for shortness of breath.  Patient is chronically vented and at home today had an episode of respiratory distress with her oxygen saturation dropped into the 80s so daughter who was at bedside started to bag her and performed chest PT as well as suctioned her trach.  As per EMS upon arrival patient's oxygen saturation was 96 to 98% on 40% FiO2 and end-tidal CO2 was around 50.  Patient on arrival is in no respiratory distress.  Daughter also reports episode of bright red blood per rectum this morning.  Of note patient was here 5 days ago for low hemoglobin, patient was transfused PRBCs and family at that time declined admission so patient was discharged home.    At home takes:  - amlodipine 10 daily  - prednisone 5 daily  - simethicone 125 q6  - duoneb q6  - seroquel 12.5 daily  - protonix 40 daily  - lexapro 10 daily  - oxycodone 2.5/5/10 q6 PRN  - multivitamin, vitamin C, iron, probiotics

## 2025-05-17 NOTE — ED PROVIDER NOTE - NSICDXPASTSURGICALHX_GEN_ALL_CORE_FT
Problem: RESPIRATORY - ADULT  Goal: Achieves optimal ventilation and oxygenation  Description: INTERVENTIONS:  - Assess for changes in respiratory status  - Assess for changes in mentation and behavior  - Position to facilitate oxygenation and minimize respiratory effort  - Oxygen administered by appropriate delivery if ordered  - Initiate smoking cessation education as indicated  - Encourage broncho-pulmonary hygiene including cough, deep breathe, Incentive Spirometry  - Assess the need for suctioning and aspirate as needed  - Assess and instruct to report SOB or any respiratory difficulty  - Respiratory Therapy support as indicated  Outcome: Progressing      PAST SURGICAL HISTORY:  H/O tracheostomy     PEG (percutaneous endoscopic gastrostomy) status

## (undated) DEVICE — TUBING IV SET GRAVITY 3Y 100" MACRO

## (undated) DEVICE — DEVICE GRASPING RAPTOR

## (undated) DEVICE — BITE BLOCK ADULT 20 X 27MM (GREEN)

## (undated) DEVICE — FORCEP RADIAL JAW 4 JUMBO 2.8MM 3.2MM 240CM ORANGE DISP

## (undated) DEVICE — SNARE CAPTIVATOR II 10MM

## (undated) DEVICE — SYR ALLIANCE II INFLATION 60ML

## (undated) DEVICE — DEVICE GRASPING RAPTOR MINI 1.9X200

## (undated) DEVICE — CATH ELECROHEM GLD PROBE 7FR

## (undated) DEVICE — PROBE FIAPC DIA 2.3MM/7FR LNTH 220CM/7.2FT

## (undated) DEVICE — TUBING SUCTION CONN 6FT STERILE

## (undated) DEVICE — TUBING SUCTION 20FT

## (undated) DEVICE — SUCTION YANKAUER NO CONTROL VENT

## (undated) DEVICE — SOL INJ NS 0.9% 500ML 2 PORT

## (undated) DEVICE — BALLOON US ENDO

## (undated) DEVICE — CATH IV SAFE BC 20G X 1.16" (PINK)

## (undated) DEVICE — PACK IV START WITH CHG

## (undated) DEVICE — NDL INJ SCLERO INTERJECT 23G

## (undated) DEVICE — FOLEY HOLDER STATLOCK 2 WAY ADULT

## (undated) DEVICE — CATH IV SAFE BC 22G X 1" (BLUE)

## (undated) DEVICE — CATH ELECHMSTAT  INJ 7FR 210CM

## (undated) DEVICE — SENSOR O2 FINGER ADULT

## (undated) DEVICE — NDL INJ SCLERO INTERJECT 25G

## (undated) DEVICE — CATH ELCTR GLD PRB 10FR

## (undated) DEVICE — FEEDING TUBE NG SUMP 16FR 48"

## (undated) DEVICE — BRUSH COLONOSCOPY CYTOLOGY

## (undated) DEVICE — ELCTR GROUNDING PAD ADULT COVIDIEN